# Patient Record
Sex: MALE | Race: WHITE | NOT HISPANIC OR LATINO | Employment: FULL TIME | URBAN - METROPOLITAN AREA
[De-identification: names, ages, dates, MRNs, and addresses within clinical notes are randomized per-mention and may not be internally consistent; named-entity substitution may affect disease eponyms.]

---

## 2017-09-26 PROBLEM — K72.90 LIVER FAILURE: Status: ACTIVE | Noted: 2017-09-26

## 2017-09-26 NOTE — PLAN OF CARE
Patient coming for liver transplant evaluation and [facility] admission.  Patient has decompensated alcoholic cirrhosis drinking heavily up until admission.  Has had several admissions recently for fluid over load.  Had mild hematemesis recently and had an EGD recently which showed small varices, thought to be due to esophagitis.  Currently patient is 30, T. Bili is 25 and Cr is 2.0.      Patient needs abdominal U/S pre debbi transplant with dopplers  Hepatology consult  Addiction psych consult and send a PETH test on admission  Hold diuretics and consider HRS treatment and nephrology consult

## 2017-09-27 ENCOUNTER — HOSPITAL ENCOUNTER (INPATIENT)
Facility: HOSPITAL | Age: 27
LOS: 42 days | Discharge: HOME HEALTH | DRG: 005 | End: 2017-11-08
Attending: INTERNAL MEDICINE | Admitting: INTERNAL MEDICINE
Payer: COMMERCIAL

## 2017-09-27 DIAGNOSIS — E66.09 CLASS 1 OBESITY DUE TO EXCESS CALORIES WITH BODY MASS INDEX (BMI) OF 33.0 TO 33.9 IN ADULT, UNSPECIFIED WHETHER SERIOUS COMORBIDITY PRESENT: ICD-10-CM

## 2017-09-27 DIAGNOSIS — D62 ACUTE BLOOD LOSS ANEMIA: ICD-10-CM

## 2017-09-27 DIAGNOSIS — R60.1 ANASARCA: ICD-10-CM

## 2017-09-27 DIAGNOSIS — E87.0 HYPERNATREMIA: ICD-10-CM

## 2017-09-27 DIAGNOSIS — E87.29 INCREASED ANION GAP METABOLIC ACIDOSIS: ICD-10-CM

## 2017-09-27 DIAGNOSIS — K70.31 ASCITES DUE TO ALCOHOLIC CIRRHOSIS: ICD-10-CM

## 2017-09-27 DIAGNOSIS — R56.9 SEIZURE: ICD-10-CM

## 2017-09-27 DIAGNOSIS — Z94.4 LIVER REPLACED BY TRANSPLANT: Chronic | ICD-10-CM

## 2017-09-27 DIAGNOSIS — K70.10 ALCOHOLIC HEPATITIS WITHOUT ASCITES: ICD-10-CM

## 2017-09-27 DIAGNOSIS — Z94.4 LIVER REPLACED BY TRANSPLANT: Primary | Chronic | ICD-10-CM

## 2017-09-27 DIAGNOSIS — E87.1 HYPONATREMIA: ICD-10-CM

## 2017-09-27 DIAGNOSIS — I63.9 STROKE DUE TO EMBOLISM: ICD-10-CM

## 2017-09-27 DIAGNOSIS — I34.0 NONRHEUMATIC MITRAL VALVE INSUFFICIENCY: ICD-10-CM

## 2017-09-27 DIAGNOSIS — R90.89 ABNORMAL FINDING ON MRI OF BRAIN: ICD-10-CM

## 2017-09-27 DIAGNOSIS — K74.60 DECOMPENSATED HEPATIC CIRRHOSIS: ICD-10-CM

## 2017-09-27 DIAGNOSIS — K72.90 LIVER FAILURE: ICD-10-CM

## 2017-09-27 DIAGNOSIS — F10.90 ALCOHOL USE DISORDER: ICD-10-CM

## 2017-09-27 DIAGNOSIS — K70.11 ASCITES DUE TO ALCOHOLIC HEPATITIS: ICD-10-CM

## 2017-09-27 DIAGNOSIS — E87.20 METABOLIC ACIDOSIS WITH NORMAL ANION GAP AND BICARBONATE LOSSES: ICD-10-CM

## 2017-09-27 DIAGNOSIS — Z87.19 HISTORY OF HEMATEMESIS: ICD-10-CM

## 2017-09-27 DIAGNOSIS — K70.11 ALCOHOLIC HEPATITIS WITH ASCITES: ICD-10-CM

## 2017-09-27 DIAGNOSIS — K76.82 HEPATIC ENCEPHALOPATHY: ICD-10-CM

## 2017-09-27 DIAGNOSIS — E87.6 HYPOKALEMIA: ICD-10-CM

## 2017-09-27 DIAGNOSIS — Z94.4 LIVER TRANSPLANTED: ICD-10-CM

## 2017-09-27 DIAGNOSIS — N17.0 ACUTE RENAL FAILURE WITH TUBULAR NECROSIS: ICD-10-CM

## 2017-09-27 DIAGNOSIS — K72.90 DECOMPENSATED HEPATIC CIRRHOSIS: ICD-10-CM

## 2017-09-27 DIAGNOSIS — Z29.89 PROPHYLACTIC IMMUNOTHERAPY: Chronic | ICD-10-CM

## 2017-09-27 DIAGNOSIS — D68.9 COAGULOPATHY: ICD-10-CM

## 2017-09-27 DIAGNOSIS — E43 SEVERE MALNUTRITION: ICD-10-CM

## 2017-09-27 DIAGNOSIS — R00.0 TACHYCARDIA: ICD-10-CM

## 2017-09-27 DIAGNOSIS — D72.829 LEUKOCYTOSIS, UNSPECIFIED TYPE: ICD-10-CM

## 2017-09-27 DIAGNOSIS — R73.9 HYPERGLYCEMIA: ICD-10-CM

## 2017-09-27 DIAGNOSIS — J81.1 PULMONARY EDEMA: ICD-10-CM

## 2017-09-27 DIAGNOSIS — N17.9 AKI (ACUTE KIDNEY INJURY): ICD-10-CM

## 2017-09-27 DIAGNOSIS — Z79.60 LONG-TERM USE OF IMMUNOSUPPRESSANT MEDICATION: Chronic | ICD-10-CM

## 2017-09-27 DIAGNOSIS — R53.81 PHYSICAL DECONDITIONING: ICD-10-CM

## 2017-09-27 DIAGNOSIS — D84.9 IMMUNOSUPPRESSION: ICD-10-CM

## 2017-09-27 DIAGNOSIS — Z91.89 AT RISK FOR OPPORTUNISTIC INFECTIONS: ICD-10-CM

## 2017-09-27 PROCEDURE — 82248 BILIRUBIN DIRECT: CPT

## 2017-09-27 PROCEDURE — 80321 ALCOHOLS BIOMARKERS 1OR 2: CPT

## 2017-09-27 PROCEDURE — 80053 COMPREHEN METABOLIC PANEL: CPT

## 2017-09-27 PROCEDURE — 80074 ACUTE HEPATITIS PANEL: CPT

## 2017-09-27 PROCEDURE — 36415 COLL VENOUS BLD VENIPUNCTURE: CPT

## 2017-09-27 PROCEDURE — 84100 ASSAY OF PHOSPHORUS: CPT

## 2017-09-27 PROCEDURE — 83605 ASSAY OF LACTIC ACID: CPT

## 2017-09-27 PROCEDURE — 83735 ASSAY OF MAGNESIUM: CPT

## 2017-09-27 PROCEDURE — 20000000 HC ICU ROOM

## 2017-09-27 PROCEDURE — 82746 ASSAY OF FOLIC ACID SERUM: CPT

## 2017-09-27 PROCEDURE — 85730 THROMBOPLASTIN TIME PARTIAL: CPT

## 2017-09-27 PROCEDURE — 82607 VITAMIN B-12: CPT

## 2017-09-27 PROCEDURE — 85025 COMPLETE CBC W/AUTO DIFF WBC: CPT

## 2017-09-27 PROCEDURE — 85610 PROTHROMBIN TIME: CPT

## 2017-09-27 PROCEDURE — 83690 ASSAY OF LIPASE: CPT

## 2017-09-27 RX ORDER — SODIUM CHLORIDE 0.9 % (FLUSH) 0.9 %
3 SYRINGE (ML) INJECTION EVERY 8 HOURS
Status: DISCONTINUED | OUTPATIENT
Start: 2017-09-28 | End: 2017-10-02

## 2017-09-28 PROBLEM — K70.11 ALCOHOLIC HEPATITIS WITH ASCITES: Status: ACTIVE | Noted: 2017-09-26

## 2017-09-28 PROBLEM — N17.9 ACUTE RENAL FAILURE: Status: ACTIVE | Noted: 2017-09-28

## 2017-09-28 PROBLEM — K72.90 DECOMPENSATED HEPATIC CIRRHOSIS: Status: ACTIVE | Noted: 2017-09-28

## 2017-09-28 PROBLEM — R60.1 ANASARCA: Status: ACTIVE | Noted: 2017-09-28

## 2017-09-28 PROBLEM — E87.29 INCREASED ANION GAP METABOLIC ACIDOSIS: Status: ACTIVE | Noted: 2017-09-28

## 2017-09-28 PROBLEM — K76.82 HEPATIC ENCEPHALOPATHY: Status: ACTIVE | Noted: 2017-09-28

## 2017-09-28 PROBLEM — K85.20 ACUTE ALCOHOLIC PANCREATITIS: Status: ACTIVE | Noted: 2017-09-28

## 2017-09-28 PROBLEM — K70.10 ALCOHOLIC HEPATITIS WITHOUT ASCITES: Status: ACTIVE | Noted: 2017-09-26

## 2017-09-28 PROBLEM — G93.41 ENCEPHALOPATHY, METABOLIC: Status: ACTIVE | Noted: 2017-09-28

## 2017-09-28 PROBLEM — E87.1 HYPONATREMIA: Status: ACTIVE | Noted: 2017-09-28

## 2017-09-28 PROBLEM — K74.60 DECOMPENSATED HEPATIC CIRRHOSIS: Status: ACTIVE | Noted: 2017-09-28

## 2017-09-28 PROBLEM — Z87.19 HISTORY OF HEMATEMESIS: Status: ACTIVE | Noted: 2017-09-28

## 2017-09-28 LAB
ALBUMIN SERPL BCP-MCNC: 1.9 G/DL
ALBUMIN SERPL BCP-MCNC: 2.1 G/DL
ALBUMIN SERPL BCP-MCNC: 2.3 G/DL
ALLENS TEST: ABNORMAL
ALP SERPL-CCNC: 100 U/L
ALP SERPL-CCNC: 98 U/L
ALP SERPL-CCNC: 98 U/L
ALT SERPL W/O P-5'-P-CCNC: 50 U/L
ALT SERPL W/O P-5'-P-CCNC: 51 U/L
ALT SERPL W/O P-5'-P-CCNC: 52 U/L
ANION GAP SERPL CALC-SCNC: 14 MMOL/L
ANION GAP SERPL CALC-SCNC: 15 MMOL/L
ANION GAP SERPL CALC-SCNC: 17 MMOL/L
ANISOCYTOSIS BLD QL SMEAR: SLIGHT
ANISOCYTOSIS BLD QL SMEAR: SLIGHT
APTT BLDCRRT: 31.1 SEC
APTT BLDCRRT: 32.1 SEC
AST SERPL-CCNC: 105 U/L
AST SERPL-CCNC: 106 U/L
AST SERPL-CCNC: 106 U/L
B-OH-BUTYR BLD STRIP-SCNC: 0.4 MMOL/L
BACTERIA #/AREA URNS AUTO: ABNORMAL /HPF
BASO STIPL BLD QL SMEAR: ABNORMAL
BASOPHILS # BLD AUTO: 0.01 K/UL
BASOPHILS NFR BLD: 0.1 %
BILIRUB DIRECT SERPL-MCNC: >14 MG/DL
BILIRUB SERPL-MCNC: 28 MG/DL
BILIRUB SERPL-MCNC: 29.4 MG/DL
BILIRUB SERPL-MCNC: 30 MG/DL
BILIRUB UR QL STRIP: ABNORMAL
BUN SERPL-MCNC: 47 MG/DL
BUN SERPL-MCNC: 47 MG/DL
BUN SERPL-MCNC: 48 MG/DL
BURR CELLS BLD QL SMEAR: ABNORMAL
BURR CELLS BLD QL SMEAR: ABNORMAL
CALCIUM SERPL-MCNC: 8.4 MG/DL
CALCIUM SERPL-MCNC: 8.9 MG/DL
CALCIUM SERPL-MCNC: 9 MG/DL
CHLORIDE SERPL-SCNC: 102 MMOL/L
CHLORIDE SERPL-SCNC: 103 MMOL/L
CHLORIDE SERPL-SCNC: 104 MMOL/L
CLARITY UR REFRACT.AUTO: ABNORMAL
CO2 SERPL-SCNC: 14 MMOL/L
CO2 SERPL-SCNC: 15 MMOL/L
CO2 SERPL-SCNC: 17 MMOL/L
COLOR UR AUTO: ABNORMAL
CREAT SERPL-MCNC: 2.4 MG/DL
CREAT SERPL-MCNC: 2.4 MG/DL
CREAT SERPL-MCNC: 2.6 MG/DL
CREAT UR-MCNC: 95 MG/DL
DELSYS: ABNORMAL
DIFFERENTIAL METHOD: ABNORMAL
EOSINOPHIL # BLD AUTO: 0 K/UL
EOSINOPHIL NFR BLD: 0.1 %
ERYTHROCYTE [DISTWIDTH] IN BLOOD BY AUTOMATED COUNT: 14.5 %
ERYTHROCYTE [DISTWIDTH] IN BLOOD BY AUTOMATED COUNT: 14.6 %
ERYTHROCYTE [DISTWIDTH] IN BLOOD BY AUTOMATED COUNT: 14.7 %
ERYTHROCYTE [SEDIMENTATION RATE] IN BLOOD BY WESTERGREN METHOD: 24 MM/H
EST. GFR  (AFRICAN AMERICAN): 37.1 ML/MIN/1.73 M^2
EST. GFR  (AFRICAN AMERICAN): 40.9 ML/MIN/1.73 M^2
EST. GFR  (AFRICAN AMERICAN): 40.9 ML/MIN/1.73 M^2
EST. GFR  (NON AFRICAN AMERICAN): 32.1 ML/MIN/1.73 M^2
EST. GFR  (NON AFRICAN AMERICAN): 35.4 ML/MIN/1.73 M^2
EST. GFR  (NON AFRICAN AMERICAN): 35.4 ML/MIN/1.73 M^2
ETHANOL SERPL-MCNC: <10 MG/DL
FLOW: 2
FOLATE SERPL-MCNC: 10.6 NG/ML
GLUCOSE SERPL-MCNC: 123 MG/DL
GLUCOSE SERPL-MCNC: 123 MG/DL
GLUCOSE SERPL-MCNC: 152 MG/DL
GLUCOSE UR QL STRIP: ABNORMAL
HAV IGM SERPL QL IA: NEGATIVE
HBV CORE IGM SERPL QL IA: NEGATIVE
HBV SURFACE AG SERPL QL IA: NEGATIVE
HCO3 UR-SCNC: 17.7 MMOL/L (ref 24–28)
HCT VFR BLD AUTO: 29.1 %
HCT VFR BLD AUTO: 30.1 %
HCT VFR BLD AUTO: 30.8 %
HCV AB SERPL QL IA: NEGATIVE
HGB BLD-MCNC: 10.5 G/DL
HGB BLD-MCNC: 10.9 G/DL
HGB BLD-MCNC: 10.9 G/DL
HGB UR QL STRIP: ABNORMAL
HIV 1+2 AB+HIV1 P24 AG SERPL QL IA: NEGATIVE
INR PPP: 1.6
INR PPP: 1.8
KETONES UR QL STRIP: NEGATIVE
LACTATE SERPL-SCNC: 1.1 MMOL/L
LACTATE SERPL-SCNC: 1.1 MMOL/L
LEUKOCYTE ESTERASE UR QL STRIP: NEGATIVE
LIPASE SERPL-CCNC: 4 U/L
LYMPHOCYTES # BLD AUTO: 0.6 K/UL
LYMPHOCYTES # BLD AUTO: 0.6 K/UL
LYMPHOCYTES # BLD AUTO: 0.9 K/UL
LYMPHOCYTES NFR BLD: 3.5 %
LYMPHOCYTES NFR BLD: 3.6 %
LYMPHOCYTES NFR BLD: 5.1 %
MAGNESIUM SERPL-MCNC: 1.9 MG/DL
MAGNESIUM SERPL-MCNC: 1.9 MG/DL
MAGNESIUM SERPL-MCNC: 2.1 MG/DL
MCH RBC QN AUTO: 38.3 PG
MCH RBC QN AUTO: 38.5 PG
MCH RBC QN AUTO: 38.7 PG
MCHC RBC AUTO-ENTMCNC: 35.4 G/DL
MCHC RBC AUTO-ENTMCNC: 36.1 G/DL
MCHC RBC AUTO-ENTMCNC: 36.2 G/DL
MCV RBC AUTO: 106 FL
MCV RBC AUTO: 107 FL
MCV RBC AUTO: 109 FL
MICROSCOPIC COMMENT: ABNORMAL
MODE: ABNORMAL
MONOCYTES # BLD AUTO: 0.9 K/UL
MONOCYTES # BLD AUTO: 1.3 K/UL
MONOCYTES # BLD AUTO: 1.3 K/UL
MONOCYTES NFR BLD: 5 %
MONOCYTES NFR BLD: 7.6 %
MONOCYTES NFR BLD: 7.6 %
NEUTROPHILS # BLD AUTO: 15.2 K/UL
NEUTROPHILS # BLD AUTO: 15.3 K/UL
NEUTROPHILS # BLD AUTO: 15.9 K/UL
NEUTROPHILS NFR BLD: 87.1 %
NEUTROPHILS NFR BLD: 87.5 %
NEUTROPHILS NFR BLD: 91.2 %
NITRITE UR QL STRIP: NEGATIVE
PCO2 BLDA: 24.4 MMHG (ref 35–45)
PH SMN: 7.47 [PH] (ref 7.35–7.45)
PH UR STRIP: 5 [PH] (ref 5–8)
PHOSPHATE SERPL-MCNC: 4.9 MG/DL
PHOSPHATE SERPL-MCNC: 5.5 MG/DL
PLATELET # BLD AUTO: 236 K/UL
PLATELET # BLD AUTO: 237 K/UL
PLATELET # BLD AUTO: 241 K/UL
PLATELET BLD QL SMEAR: ABNORMAL
PMV BLD AUTO: 10.3 FL
PMV BLD AUTO: 10.6 FL
PMV BLD AUTO: 9.9 FL
PO2 BLDA: 83 MMHG (ref 80–100)
POC BE: -6 MMOL/L
POC SATURATED O2: 97 % (ref 95–100)
POC TCO2: 18 MMOL/L (ref 23–27)
POIKILOCYTOSIS BLD QL SMEAR: ABNORMAL
POIKILOCYTOSIS BLD QL SMEAR: SLIGHT
POLYCHROMASIA BLD QL SMEAR: ABNORMAL
POLYCHROMASIA BLD QL SMEAR: ABNORMAL
POTASSIUM SERPL-SCNC: 2.7 MMOL/L
POTASSIUM SERPL-SCNC: 3.4 MMOL/L
POTASSIUM SERPL-SCNC: 3.5 MMOL/L
PROT SERPL-MCNC: 6 G/DL
PROT SERPL-MCNC: 6.4 G/DL
PROT SERPL-MCNC: 6.6 G/DL
PROT UR QL STRIP: NEGATIVE
PROTHROMBIN TIME: 15.8 SEC
PROTHROMBIN TIME: 17.8 SEC
RBC # BLD AUTO: 2.74 M/UL
RBC # BLD AUTO: 2.82 M/UL
RBC # BLD AUTO: 2.83 M/UL
RBC #/AREA URNS AUTO: 0 /HPF (ref 0–4)
SAMPLE: ABNORMAL
SITE: ABNORMAL
SODIUM SERPL-SCNC: 133 MMOL/L
SODIUM SERPL-SCNC: 133 MMOL/L
SODIUM SERPL-SCNC: 135 MMOL/L
SODIUM UR-SCNC: 24 MMOL/L
SP GR UR STRIP: 1.01 (ref 1–1.03)
SP02: 96
SQUAMOUS #/AREA URNS AUTO: 1 /HPF
URN SPEC COLLECT METH UR: ABNORMAL
UROBILINOGEN UR STRIP-ACNC: 4 EU/DL
UUN UR-MCNC: 603 MG/DL
VANCOMYCIN TROUGH SERPL-MCNC: 36.8 UG/ML
VIT B12 SERPL-MCNC: >2000 PG/ML
WBC # BLD AUTO: 17.33 K/UL
WBC # BLD AUTO: 17.56 K/UL
WBC # BLD AUTO: 17.73 K/UL
WBC #/AREA URNS AUTO: 3 /HPF (ref 0–5)

## 2017-09-28 PROCEDURE — 82803 BLOOD GASES ANY COMBINATION: CPT

## 2017-09-28 PROCEDURE — 25000003 PHARM REV CODE 250: Performed by: STUDENT IN AN ORGANIZED HEALTH CARE EDUCATION/TRAINING PROGRAM

## 2017-09-28 PROCEDURE — 83735 ASSAY OF MAGNESIUM: CPT

## 2017-09-28 PROCEDURE — 84100 ASSAY OF PHOSPHORUS: CPT

## 2017-09-28 PROCEDURE — 84540 ASSAY OF URINE/UREA-N: CPT

## 2017-09-28 PROCEDURE — 80053 COMPREHEN METABOLIC PANEL: CPT

## 2017-09-28 PROCEDURE — 63600175 PHARM REV CODE 636 W HCPCS

## 2017-09-28 PROCEDURE — 99900035 HC TECH TIME PER 15 MIN (STAT)

## 2017-09-28 PROCEDURE — 87086 URINE CULTURE/COLONY COUNT: CPT

## 2017-09-28 PROCEDURE — 83605 ASSAY OF LACTIC ACID: CPT

## 2017-09-28 PROCEDURE — S0028 INJECTION, FAMOTIDINE, 20 MG: HCPCS | Performed by: STUDENT IN AN ORGANIZED HEALTH CARE EDUCATION/TRAINING PROGRAM

## 2017-09-28 PROCEDURE — 80202 ASSAY OF VANCOMYCIN: CPT

## 2017-09-28 PROCEDURE — 80320 DRUG SCREEN QUANTALCOHOLS: CPT

## 2017-09-28 PROCEDURE — 99223 1ST HOSP IP/OBS HIGH 75: CPT | Mod: ,,, | Performed by: INTERNAL MEDICINE

## 2017-09-28 PROCEDURE — 86703 HIV-1/HIV-2 1 RESULT ANTBDY: CPT

## 2017-09-28 PROCEDURE — 80053 COMPREHEN METABOLIC PANEL: CPT | Mod: 91

## 2017-09-28 PROCEDURE — 93010 ELECTROCARDIOGRAM REPORT: CPT | Mod: ,,, | Performed by: INTERNAL MEDICINE

## 2017-09-28 PROCEDURE — A4216 STERILE WATER/SALINE, 10 ML: HCPCS | Performed by: STUDENT IN AN ORGANIZED HEALTH CARE EDUCATION/TRAINING PROGRAM

## 2017-09-28 PROCEDURE — 82010 KETONE BODYS QUAN: CPT

## 2017-09-28 PROCEDURE — 84300 ASSAY OF URINE SODIUM: CPT

## 2017-09-28 PROCEDURE — 99222 1ST HOSP IP/OBS MODERATE 55: CPT | Mod: ,,, | Performed by: PSYCHIATRY & NEUROLOGY

## 2017-09-28 PROCEDURE — 85610 PROTHROMBIN TIME: CPT

## 2017-09-28 PROCEDURE — 85730 THROMBOPLASTIN TIME PARTIAL: CPT

## 2017-09-28 PROCEDURE — 63600175 PHARM REV CODE 636 W HCPCS: Performed by: STUDENT IN AN ORGANIZED HEALTH CARE EDUCATION/TRAINING PROGRAM

## 2017-09-28 PROCEDURE — 20000000 HC ICU ROOM

## 2017-09-28 PROCEDURE — 99291 CRITICAL CARE FIRST HOUR: CPT | Mod: ,,, | Performed by: INTERNAL MEDICINE

## 2017-09-28 PROCEDURE — 81001 URINALYSIS AUTO W/SCOPE: CPT

## 2017-09-28 PROCEDURE — 87040 BLOOD CULTURE FOR BACTERIA: CPT | Mod: 59

## 2017-09-28 PROCEDURE — 27000221 HC OXYGEN, UP TO 24 HOURS

## 2017-09-28 PROCEDURE — 83735 ASSAY OF MAGNESIUM: CPT | Mod: 91

## 2017-09-28 PROCEDURE — 82570 ASSAY OF URINE CREATININE: CPT

## 2017-09-28 PROCEDURE — 36600 WITHDRAWAL OF ARTERIAL BLOOD: CPT

## 2017-09-28 PROCEDURE — 85025 COMPLETE CBC W/AUTO DIFF WBC: CPT | Mod: 91

## 2017-09-28 RX ORDER — FUROSEMIDE 10 MG/ML
40 INJECTION INTRAMUSCULAR; INTRAVENOUS DAILY
Status: DISCONTINUED | OUTPATIENT
Start: 2017-09-28 | End: 2017-09-28

## 2017-09-28 RX ORDER — ONDANSETRON 2 MG/ML
4 INJECTION INTRAMUSCULAR; INTRAVENOUS EVERY 6 HOURS PRN
Status: DISCONTINUED | OUTPATIENT
Start: 2017-09-28 | End: 2017-10-12

## 2017-09-28 RX ORDER — MAGNESIUM SULFATE HEPTAHYDRATE 40 MG/ML
2 INJECTION, SOLUTION INTRAVENOUS ONCE
Status: COMPLETED | OUTPATIENT
Start: 2017-09-28 | End: 2017-09-28

## 2017-09-28 RX ORDER — PANTOPRAZOLE SODIUM 40 MG/10ML
40 INJECTION, POWDER, LYOPHILIZED, FOR SOLUTION INTRAVENOUS 2 TIMES DAILY
Status: DISCONTINUED | OUTPATIENT
Start: 2017-09-28 | End: 2017-09-28

## 2017-09-28 RX ORDER — LACTULOSE 10 G/15ML
30 SOLUTION ORAL EVERY 6 HOURS
Status: DISCONTINUED | OUTPATIENT
Start: 2017-09-28 | End: 2017-09-28

## 2017-09-28 RX ORDER — PANTOPRAZOLE SODIUM 40 MG/10ML
40 INJECTION, POWDER, LYOPHILIZED, FOR SOLUTION INTRAVENOUS DAILY
Status: DISCONTINUED | OUTPATIENT
Start: 2017-09-28 | End: 2017-09-28

## 2017-09-28 RX ORDER — FUROSEMIDE 10 MG/ML
40 INJECTION INTRAMUSCULAR; INTRAVENOUS DAILY
Status: DISCONTINUED | OUTPATIENT
Start: 2017-09-29 | End: 2017-10-02

## 2017-09-28 RX ORDER — VANCOMYCIN HCL IN 5 % DEXTROSE 1.5G/250ML
15 PLASTIC BAG, INJECTION (ML) INTRAVENOUS
Status: DISCONTINUED | OUTPATIENT
Start: 2017-09-28 | End: 2017-09-28

## 2017-09-28 RX ORDER — FLUCONAZOLE 2 MG/ML
400 INJECTION, SOLUTION INTRAVENOUS
Status: DISCONTINUED | OUTPATIENT
Start: 2017-09-28 | End: 2017-10-01

## 2017-09-28 RX ORDER — FUROSEMIDE 40 MG/1
40 TABLET ORAL DAILY
Status: DISCONTINUED | OUTPATIENT
Start: 2017-09-28 | End: 2017-09-28

## 2017-09-28 RX ORDER — POTASSIUM CHLORIDE 7.45 MG/ML
10 INJECTION INTRAVENOUS
Status: COMPLETED | OUTPATIENT
Start: 2017-09-28 | End: 2017-09-28

## 2017-09-28 RX ORDER — LACTULOSE 10 G/15ML
200 SOLUTION ORAL; RECTAL 3 TIMES DAILY
Status: DISCONTINUED | OUTPATIENT
Start: 2017-09-28 | End: 2017-09-30

## 2017-09-28 RX ORDER — POTASSIUM CHLORIDE 7.45 MG/ML
10 INJECTION INTRAVENOUS
Status: DISCONTINUED | OUTPATIENT
Start: 2017-09-28 | End: 2017-09-29

## 2017-09-28 RX ORDER — PANTOPRAZOLE SODIUM 40 MG/1
40 TABLET, DELAYED RELEASE ORAL DAILY
Status: DISCONTINUED | OUTPATIENT
Start: 2017-09-28 | End: 2017-09-28

## 2017-09-28 RX ORDER — FAMOTIDINE 10 MG/ML
20 INJECTION INTRAVENOUS 2 TIMES DAILY
Status: DISCONTINUED | OUTPATIENT
Start: 2017-09-28 | End: 2017-10-02

## 2017-09-28 RX ORDER — SPIRONOLACTONE 25 MG/1
100 TABLET ORAL DAILY
Status: DISCONTINUED | OUTPATIENT
Start: 2017-09-28 | End: 2017-09-28

## 2017-09-28 RX ADMIN — Medication 3 ML: at 05:09

## 2017-09-28 RX ADMIN — FAMOTIDINE 20 MG: 10 INJECTION, SOLUTION INTRAVENOUS at 08:09

## 2017-09-28 RX ADMIN — POTASSIUM CHLORIDE 10 MEQ: 10 INJECTION, SOLUTION INTRAVENOUS at 08:09

## 2017-09-28 RX ADMIN — PIPERACILLIN AND TAZOBACTAM 4.5 G: 4; .5 INJECTION, POWDER, FOR SOLUTION INTRAVENOUS at 05:09

## 2017-09-28 RX ADMIN — METHYLPREDNISOLONE SODIUM SUCCINATE 40 MG: 40 INJECTION, POWDER, FOR SOLUTION INTRAMUSCULAR; INTRAVENOUS at 10:09

## 2017-09-28 RX ADMIN — FLUCONAZOLE 400 MG: 2 INJECTION INTRAVENOUS at 11:09

## 2017-09-28 RX ADMIN — PIPERACILLIN AND TAZOBACTAM 4.5 G: 4; .5 INJECTION, POWDER, FOR SOLUTION INTRAVENOUS at 01:09

## 2017-09-28 RX ADMIN — Medication 1500 MG: at 01:09

## 2017-09-28 RX ADMIN — LACTULOSE 200 G: 10 SOLUTION ORAL at 05:09

## 2017-09-28 RX ADMIN — POTASSIUM CHLORIDE 10 MEQ: 10 INJECTION, SOLUTION INTRAVENOUS at 07:09

## 2017-09-28 RX ADMIN — FUROSEMIDE 40 MG: 10 INJECTION, SOLUTION INTRAVENOUS at 04:09

## 2017-09-28 RX ADMIN — ONDANSETRON 4 MG: 2 INJECTION INTRAMUSCULAR; INTRAVENOUS at 04:09

## 2017-09-28 RX ADMIN — FAMOTIDINE 20 MG: 10 INJECTION, SOLUTION INTRAVENOUS at 10:09

## 2017-09-28 RX ADMIN — LACTULOSE 200 G: 10 SOLUTION ORAL at 04:09

## 2017-09-28 RX ADMIN — LACTULOSE 200 G: 10 SOLUTION ORAL at 10:09

## 2017-09-28 RX ADMIN — MAGNESIUM SULFATE IN WATER 2 G: 40 INJECTION, SOLUTION INTRAVENOUS at 07:09

## 2017-09-28 RX ADMIN — PIPERACILLIN AND TAZOBACTAM 4.5 G: 4; .5 INJECTION, POWDER, FOR SOLUTION INTRAVENOUS at 09:09

## 2017-09-28 RX ADMIN — POTASSIUM CHLORIDE 10 MEQ: 10 INJECTION, SOLUTION INTRAVENOUS at 10:09

## 2017-09-28 RX ADMIN — POTASSIUM CHLORIDE 10 MEQ: 10 INJECTION, SOLUTION INTRAVENOUS at 09:09

## 2017-09-28 RX ADMIN — Medication 3 ML: at 09:09

## 2017-09-28 NOTE — SUBJECTIVE & OBJECTIVE
"  Psychotherapeutics     None        Review of Systems  Objective:     Vital Signs (Most Recent):  Temp: 98.9 °F (37.2 °C) (09/28/17 0701)  Pulse: (!) 118 (09/28/17 1000)  Resp: (!) 36 (09/28/17 1000)  BP: 132/60 (09/28/17 1000)  SpO2: 95 % (09/28/17 1000) Vital Signs (24h Range):  Temp:  [98.9 °F (37.2 °C)-99.3 °F (37.4 °C)] 98.9 °F (37.2 °C)  Pulse:  [112-121] 118  Resp:  [18-36] 36  SpO2:  [94 %-97 %] 95 %  BP: (116-139)/(57-83) 132/60     Height: 5' 7" (170.2 cm)  Weight: 97.1 kg (214 lb 1.1 oz)  Body mass index is 33.53 kg/m².      Intake/Output Summary (Last 24 hours) at 09/28/17 1134  Last data filed at 09/28/17 1000   Gross per 24 hour   Intake              705 ml   Output             2145 ml   Net            -1440 ml       Physical Exam  Mental Status Exam:  Appearance: jaundiced, eyes closed, abdominal distention  Behavior/Cooperation: opens eyes briefly, no meaningful eye contact  Speech: only moans in response to questions  Mood: unable to ilicit  Affect: unable to assess  Thought Process: unable to assess  Thought Content: unable to assess  Orientation: unable to assess  Memory: unable to assess  Attention Span/Concentration: significantly impaired  Insight: unable to assess  Judgment: unable to assess    Significant Labs:   Recent Results (from the past 48 hour(s))   Comprehensive metabolic panel    Collection Time: 09/27/17 11:40 PM   Result Value Ref Range    Sodium 133 (L) 136 - 145 mmol/L    Potassium 3.5 3.5 - 5.1 mmol/L    Chloride 102 95 - 110 mmol/L    CO2 14 (L) 23 - 29 mmol/L    Glucose 123 (H) 70 - 110 mg/dL    BUN, Bld 47 (H) 6 - 20 mg/dL    Creatinine 2.6 (H) 0.5 - 1.4 mg/dL    Calcium 9.0 8.7 - 10.5 mg/dL    Total Protein 6.6 6.0 - 8.4 g/dL    Albumin 2.3 (L) 3.5 - 5.2 g/dL    Total Bilirubin 30.0 (H) 0.1 - 1.0 mg/dL    Alkaline Phosphatase 98 55 - 135 U/L     (H) 10 - 40 U/L    ALT 52 (H) 10 - 44 U/L    Anion Gap 17 (H) 8 - 16 mmol/L    eGFR if African American 37.1 (A) >60 " mL/min/1.73 m^2    eGFR if non  32.1 (A) >60 mL/min/1.73 m^2   Lactic acid, plasma    Collection Time: 09/27/17 11:40 PM   Result Value Ref Range    Lactate (Lactic Acid) 1.1 0.5 - 2.2 mmol/L   Lipase    Collection Time: 09/27/17 11:40 PM   Result Value Ref Range    Lipase 4 4 - 60 U/L   Magnesium    Collection Time: 09/27/17 11:40 PM   Result Value Ref Range    Magnesium 1.9 1.6 - 2.6 mg/dL   Phosphorus    Collection Time: 09/27/17 11:40 PM   Result Value Ref Range    Phosphorus 5.5 (H) 2.7 - 4.5 mg/dL   CBC auto differential    Collection Time: 09/27/17 11:40 PM   Result Value Ref Range    WBC 17.73 (H) 3.90 - 12.70 K/uL    RBC 2.83 (L) 4.60 - 6.20 M/uL    Hemoglobin 10.9 (L) 14.0 - 18.0 g/dL    Hematocrit 30.8 (L) 40.0 - 54.0 %     (H) 82 - 98 fL    MCH 38.5 (H) 27.0 - 31.0 pg    MCHC 35.4 32.0 - 36.0 g/dL    RDW 14.6 (H) 11.5 - 14.5 %    Platelets 237 150 - 350 K/uL    MPV 10.6 9.2 - 12.9 fL    Gran # 15.3 (H) 1.8 - 7.7 K/uL    Lymph # 0.9 (L) 1.0 - 4.8 K/uL    Mono # 1.3 (H) 0.3 - 1.0 K/uL    Eos # 0.0 0.0 - 0.5 K/uL    Baso # 0.01 0.00 - 0.20 K/uL    Gran% 87.1 (H) 38.0 - 73.0 %    Lymph% 5.1 (L) 18.0 - 48.0 %    Mono% 7.6 4.0 - 15.0 %    Eosinophil% 0.1 0.0 - 8.0 %    Basophil% 0.1 0.0 - 1.9 %    Platelet Estimate Appears normal     Aniso Slight     Poik Moderate     Poly Occasional     [place] Cells Moderate     Basophilic Stippling Occasional     Differential Method Automated    Protime-INR    Collection Time: 09/27/17 11:40 PM   Result Value Ref Range    Prothrombin Time 15.8 (H) 9.0 - 12.5 sec    INR 1.6 (H) 0.8 - 1.2   APTT    Collection Time: 09/27/17 11:40 PM   Result Value Ref Range    aPTT 31.1 21.0 - 32.0 sec   Hepatitis panel, acute    Collection Time: 09/27/17 11:40 PM   Result Value Ref Range    Hepatitis B Surface Ag Negative     Hep B C IgM Negative     Hep A IgM Negative     Hepatitis C Ab Negative    Bilirubin, direct    Collection Time: 09/27/17 11:40 PM   Result Value  Ref Range    Bilirubin, Direct >14.0 (H) 0.1 - 0.3 mg/dL   Folate    Collection Time: 09/27/17 11:40 PM   Result Value Ref Range    Folate 10.6 4.0 - 24.0 ng/mL   Vitamin B12    Collection Time: 09/27/17 11:40 PM   Result Value Ref Range    Vitamin B-12 >2000 (H) 210 - 950 pg/mL   Urinalysis    Collection Time: 09/28/17 12:21 AM   Result Value Ref Range    Specimen UA Urine, Catheterized     Color, UA Martha Yellow, Straw, Martha    Appearance, UA Hazy (A) Clear    pH, UA 5.0 5.0 - 8.0    Specific Gravity, UA 1.015 1.005 - 1.030    Protein, UA Negative Negative    Glucose, UA 1+ (A) Negative    Ketones, UA Negative Negative    Bilirubin (UA) 2+ (A) Negative    Occult Blood UA 1+ (A) Negative    Nitrite, UA Negative Negative    Urobilinogen, UA 4.0 <2.0 EU/dL    Leukocytes, UA Negative Negative   Creatinine, urine, random    Collection Time: 09/28/17 12:21 AM   Result Value Ref Range    Creatinine, Random Ur 95.0 23.0 - 375.0 mg/dL   Urea nitrogen, urine    Collection Time: 09/28/17 12:21 AM   Result Value Ref Range    Urine Urea Nitrogen, Random 603 140 - 1050 mg/dL   Urinalysis Microscopic    Collection Time: 09/28/17 12:21 AM   Result Value Ref Range    RBC, UA 0 0 - 4 /hpf    WBC, UA 3 0 - 5 /hpf    Bacteria, UA Few (A) None-Occ /hpf    Squam Epithel, UA 1 /hpf    Microscopic Comment SEE COMMENT    Ethanol    Collection Time: 09/28/17 12:47 AM   Result Value Ref Range    Alcohol, Medical, Serum <10 <10 mg/dL   ISTAT PROCEDURE    Collection Time: 09/28/17  2:00 AM   Result Value Ref Range    POC PH 7.467 (H) 7.35 - 7.45    POC PCO2 24.4 (LL) 35 - 45 mmHg    POC PO2 83 80 - 100 mmHg    POC HCO3 17.7 (L) 24 - 28 mmol/L    POC BE -6 -2 to 2 mmol/L    POC SATURATED O2 97 95 - 100 %    POC TCO2 18 (L) 23 - 27 mmol/L    Rate 24     Sample ARTERIAL     Site RR     Allens Test N/A     DelSys Nasal Can     Mode SPONT     Flow 2     Sp02 96    VANCOMYCIN, TROUGH before 4th dose    Collection Time: 09/28/17  2:46 AM   Result  Value Ref Range    Vancomycin-Trough 36.8 (HH) 10.0 - 22.0 ug/mL   Comprehensive metabolic panel    Collection Time: 09/28/17  2:46 AM   Result Value Ref Range    Sodium 133 (L) 136 - 145 mmol/L    Potassium 3.4 (L) 3.5 - 5.1 mmol/L    Chloride 103 95 - 110 mmol/L    CO2 15 (L) 23 - 29 mmol/L    Glucose 152 (H) 70 - 110 mg/dL    BUN, Bld 47 (H) 6 - 20 mg/dL    Creatinine 2.4 (H) 0.5 - 1.4 mg/dL    Calcium 8.9 8.7 - 10.5 mg/dL    Total Protein 6.4 6.0 - 8.4 g/dL    Albumin 2.1 (L) 3.5 - 5.2 g/dL    Total Bilirubin 29.4 (H) 0.1 - 1.0 mg/dL    Alkaline Phosphatase 100 55 - 135 U/L     (H) 10 - 40 U/L    ALT 51 (H) 10 - 44 U/L    Anion Gap 15 8 - 16 mmol/L    eGFR if African American 40.9 (A) >60 mL/min/1.73 m^2    eGFR if non  35.4 (A) >60 mL/min/1.73 m^2   Magnesium    Collection Time: 09/28/17  2:46 AM   Result Value Ref Range    Magnesium 1.9 1.6 - 2.6 mg/dL   Phosphorus    Collection Time: 09/28/17  2:46 AM   Result Value Ref Range    Phosphorus 4.9 (H) 2.7 - 4.5 mg/dL   CBC auto differential    Collection Time: 09/28/17  2:46 AM   Result Value Ref Range    WBC 17.33 (H) 3.90 - 12.70 K/uL    RBC 2.82 (L) 4.60 - 6.20 M/uL    Hemoglobin 10.9 (L) 14.0 - 18.0 g/dL    Hematocrit 30.1 (L) 40.0 - 54.0 %     (H) 82 - 98 fL    MCH 38.7 (H) 27.0 - 31.0 pg    MCHC 36.2 (H) 32.0 - 36.0 g/dL    RDW 14.7 (H) 11.5 - 14.5 %    Platelets 236 150 - 350 K/uL    MPV 9.9 9.2 - 12.9 fL    Gran # 15.2 (H) 1.8 - 7.7 K/uL    Lymph # 0.6 (L) 1.0 - 4.8 K/uL    Mono # 1.3 (H) 0.3 - 1.0 K/uL    Eos # 0.0 0.0 - 0.5 K/uL    Baso # 0.01 0.00 - 0.20 K/uL    Gran% 87.5 (H) 38.0 - 73.0 %    Lymph% 3.5 (L) 18.0 - 48.0 %    Mono% 7.6 4.0 - 15.0 %    Eosinophil% 0.1 0.0 - 8.0 %    Basophil% 0.1 0.0 - 1.9 %    Differential Method Automated    Beta - Hydroxybutyrate, Serum    Collection Time: 09/28/17  2:46 AM   Result Value Ref Range    Beta-Hydroxybutyrate 0.4 0.0 - 0.5 mmol/L   HIV-1 and HIV-2 antibodies    Collection  Time: 09/28/17  2:46 AM   Result Value Ref Range    HIV 1/2 Ag/Ab Negative Negative      No results found for: PHENYTOIN, PHENOBARB, VALPROATE, CBMZ    Significant Imaging: I have reviewed all pertinent imaging results/findings within the past 24 hours.

## 2017-09-28 NOTE — ASSESSMENT & PLAN NOTE
Anion Gap Metabolic Acidosis  -Corrected AG of 19 given hypoalbuminemia  -bicarb this morning on renal panel of 15.  -Alkalemia present on ABG last night.  -recommend repeat of ABG  -he has a primary respiratory alkalosis with underlying metabolic acidosis.    -No treatment from renal standpoint is needed at this time.  Will need close monitoring for a decline in his respiratory status.

## 2017-09-28 NOTE — PLAN OF CARE
Problem: Patient Care Overview  Goal: Plan of Care Review  Pt arrived from outside facility during shift. Pt arrives jaundiced with chicas in place (placed 9/27), Midline to R FA (placed 9/27), and PIV x 1. Pt oriented but confused at times. Abdomen US done at bedside. Pt given Lactulose enema, tolerated well, stool evacuated. Pt in Sinus Tach in 110s. Pt Tmax 99.3. Pt did have one episode of vomiting with green emesis, zofran given. Pt failed bedside swallow screen.  VS and assessment per flow sheet, patient progressing towards goals as tolerated, plan of care reviewed with Jhonny Diana and family, all concerns addressed, will continue to monitor.

## 2017-09-28 NOTE — CONSULTS
"[facility]  Addiction Psychiatry Consult Note    Patient Name: Jhonny Diana  MRN: [id number]   Code Status: Full Code  Admission Date: 9/27/2017  Hospital Length of Stay: 1 days  Expected Discharge Date:   Attending Physician: Sajan Sandra MD  Primary Care Provider: Provider [employer]    Current Legal Status: N/A    Patient information was obtained from parent, relative(s) and ER records.     Subjective:     Principal Problem:Alcoholic hepatitis without ascites    HPI: Per H&P: 26 yo male with hx of childhood asthma and heavy alcohol abuse (fifth of liquor daily since teenager, last drink 2.5 wks ago per mother) who [place] [facility] [place]. Patient initially presented to OSH for a 2 mo hx of progressively worsening abdominal pain with associated fatigue, jaundice, and abdominal distention with ~20# weight gain. HPI given by patient's mother and OSH records as patient's participation in interview limited by previous administration of pain medication at OSH.     On interview: Pt jaundiced and acutely disoriented, unable to attend to interview, only moans in response to basic questioning. Obtained some collateral information per pt's mother and Aunt, who are present in the room. They state that pt has been drinking one fifth of whiskey since his teenage years, and they believe that he initially got started to treat "social anxiety." Pt has been seen by multiple psychiatrists in the past, all of which recommended alcohol rehab/cessation prior to addressing other psychiatric issues. Pt recently received diagnosis of alcoholic hepatitis earlier this month. Pt has not had significant periods of sobriety in the past. Has never been to residential rehab or participated in IOP. Pt went to AA for brief periods in the past but stopped because it was "too depressing."     Hospital Course: No notes on file      Psychotherapeutics     None        Review " "of Systems  Objective:     Vital Signs (Most Recent):  Temp: 98.9 °F (37.2 °C) (09/28/17 0701)  Pulse: (!) 118 (09/28/17 1000)  Resp: (!) 36 (09/28/17 1000)  BP: 132/60 (09/28/17 1000)  SpO2: 95 % (09/28/17 1000) Vital Signs (24h Range):  Temp:  [98.9 °F (37.2 °C)-99.3 °F (37.4 °C)] 98.9 °F (37.2 °C)  Pulse:  [112-121] 118  Resp:  [18-36] 36  SpO2:  [94 %-97 %] 95 %  BP: (116-139)/(57-83) 132/60     Height: 5' 7" (170.2 cm)  Weight: 97.1 kg (214 lb 1.1 oz)  Body mass index is 33.53 kg/m².      Intake/Output Summary (Last 24 hours) at 09/28/17 1134  Last data filed at 09/28/17 1000   Gross per 24 hour   Intake              705 ml   Output             2145 ml   Net            -1440 ml       Physical Exam  Mental Status Exam:  Appearance: jaundiced, eyes closed, abdominal distention  Behavior/Cooperation: opens eyes briefly, no meaningful eye contact  Speech: only moans in response to questions  Mood: unable to ilicit  Affect: unable to assess  Thought Process: unable to assess  Thought Content: unable to assess  Orientation: unable to assess  Memory: unable to assess  Attention Span/Concentration: significantly impaired  Insight: unable to assess  Judgment: unable to assess    Significant Labs:   Recent Results (from the past 48 hour(s))   Comprehensive metabolic panel    Collection Time: 09/27/17 11:40 PM   Result Value Ref Range    Sodium 133 (L) 136 - 145 mmol/L    Potassium 3.5 3.5 - 5.1 mmol/L    Chloride 102 95 - 110 mmol/L    CO2 14 (L) 23 - 29 mmol/L    Glucose 123 (H) 70 - 110 mg/dL    BUN, Bld 47 (H) 6 - 20 mg/dL    Creatinine 2.6 (H) 0.5 - 1.4 mg/dL    Calcium 9.0 8.7 - 10.5 mg/dL    Total Protein 6.6 6.0 - 8.4 g/dL    Albumin 2.3 (L) 3.5 - 5.2 g/dL    Total Bilirubin 30.0 (H) 0.1 - 1.0 mg/dL    Alkaline Phosphatase 98 55 - 135 U/L     (H) 10 - 40 U/L    ALT 52 (H) 10 - 44 U/L    Anion Gap 17 (H) 8 - 16 mmol/L    eGFR if African American 37.1 (A) >60 mL/min/1.73 m^2    eGFR if non African American " 32.1 (A) >60 mL/min/1.73 m^2   Lactic acid, plasma    Collection Time: 09/27/17 11:40 PM   Result Value Ref Range    Lactate (Lactic Acid) 1.1 0.5 - 2.2 mmol/L   Lipase    Collection Time: 09/27/17 11:40 PM   Result Value Ref Range    Lipase 4 4 - 60 U/L   Magnesium    Collection Time: 09/27/17 11:40 PM   Result Value Ref Range    Magnesium 1.9 1.6 - 2.6 mg/dL   Phosphorus    Collection Time: 09/27/17 11:40 PM   Result Value Ref Range    Phosphorus 5.5 (H) 2.7 - 4.5 mg/dL   CBC auto differential    Collection Time: 09/27/17 11:40 PM   Result Value Ref Range    WBC 17.73 (H) 3.90 - 12.70 K/uL    RBC 2.83 (L) 4.60 - 6.20 M/uL    Hemoglobin 10.9 (L) 14.0 - 18.0 g/dL    Hematocrit 30.8 (L) 40.0 - 54.0 %     (H) 82 - 98 fL    MCH 38.5 (H) 27.0 - 31.0 pg    MCHC 35.4 32.0 - 36.0 g/dL    RDW 14.6 (H) 11.5 - 14.5 %    Platelets 237 150 - 350 K/uL    MPV 10.6 9.2 - 12.9 fL    Gran # 15.3 (H) 1.8 - 7.7 K/uL    Lymph # 0.9 (L) 1.0 - 4.8 K/uL    Mono # 1.3 (H) 0.3 - 1.0 K/uL    Eos # 0.0 0.0 - 0.5 K/uL    Baso # 0.01 0.00 - 0.20 K/uL    Gran% 87.1 (H) 38.0 - 73.0 %    Lymph% 5.1 (L) 18.0 - 48.0 %    Mono% 7.6 4.0 - 15.0 %    Eosinophil% 0.1 0.0 - 8.0 %    Basophil% 0.1 0.0 - 1.9 %    Platelet Estimate Appears normal     Aniso Slight     Poik Moderate     Poly Occasional     [place] Cells Moderate     Basophilic Stippling Occasional     Differential Method Automated    Protime-INR    Collection Time: 09/27/17 11:40 PM   Result Value Ref Range    Prothrombin Time 15.8 (H) 9.0 - 12.5 sec    INR 1.6 (H) 0.8 - 1.2   APTT    Collection Time: 09/27/17 11:40 PM   Result Value Ref Range    aPTT 31.1 21.0 - 32.0 sec   Hepatitis panel, acute    Collection Time: 09/27/17 11:40 PM   Result Value Ref Range    Hepatitis B Surface Ag Negative     Hep B C IgM Negative     Hep A IgM Negative     Hepatitis C Ab Negative    Bilirubin, direct    Collection Time: 09/27/17 11:40 PM   Result Value Ref Range    Bilirubin, Direct >14.0 (H) 0.1 - 0.3  mg/dL   Folate    Collection Time: 09/27/17 11:40 PM   Result Value Ref Range    Folate 10.6 4.0 - 24.0 ng/mL   Vitamin B12    Collection Time: 09/27/17 11:40 PM   Result Value Ref Range    Vitamin B-12 >2000 (H) 210 - 950 pg/mL   Urinalysis    Collection Time: 09/28/17 12:21 AM   Result Value Ref Range    Specimen UA Urine, Catheterized     Color, UA Martha Yellow, Straw, Martha    Appearance, UA Hazy (A) Clear    pH, UA 5.0 5.0 - 8.0    Specific Gravity, UA 1.015 1.005 - 1.030    Protein, UA Negative Negative    Glucose, UA 1+ (A) Negative    Ketones, UA Negative Negative    Bilirubin (UA) 2+ (A) Negative    Occult Blood UA 1+ (A) Negative    Nitrite, UA Negative Negative    Urobilinogen, UA 4.0 <2.0 EU/dL    Leukocytes, UA Negative Negative   Creatinine, urine, random    Collection Time: 09/28/17 12:21 AM   Result Value Ref Range    Creatinine, Random Ur 95.0 23.0 - 375.0 mg/dL   Urea nitrogen, urine    Collection Time: 09/28/17 12:21 AM   Result Value Ref Range    Urine Urea Nitrogen, Random 603 140 - 1050 mg/dL   Urinalysis Microscopic    Collection Time: 09/28/17 12:21 AM   Result Value Ref Range    RBC, UA 0 0 - 4 /hpf    WBC, UA 3 0 - 5 /hpf    Bacteria, UA Few (A) None-Occ /hpf    Squam Epithel, UA 1 /hpf    Microscopic Comment SEE COMMENT    Ethanol    Collection Time: 09/28/17 12:47 AM   Result Value Ref Range    Alcohol, Medical, Serum <10 <10 mg/dL   ISTAT PROCEDURE    Collection Time: 09/28/17  2:00 AM   Result Value Ref Range    POC PH 7.467 (H) 7.35 - 7.45    POC PCO2 24.4 (LL) 35 - 45 mmHg    POC PO2 83 80 - 100 mmHg    POC HCO3 17.7 (L) 24 - 28 mmol/L    POC BE -6 -2 to 2 mmol/L    POC SATURATED O2 97 95 - 100 %    POC TCO2 18 (L) 23 - 27 mmol/L    Rate 24     Sample ARTERIAL     Site RR     Allens Test N/A     DelSys Nasal Can     Mode SPONT     Flow 2     Sp02 96    VANCOMYCIN, TROUGH before 4th dose    Collection Time: 09/28/17  2:46 AM   Result Value Ref Range    Vancomycin-Trough 36.8 (HH) 10.0  - 22.0 ug/mL   Comprehensive metabolic panel    Collection Time: 09/28/17  2:46 AM   Result Value Ref Range    Sodium 133 (L) 136 - 145 mmol/L    Potassium 3.4 (L) 3.5 - 5.1 mmol/L    Chloride 103 95 - 110 mmol/L    CO2 15 (L) 23 - 29 mmol/L    Glucose 152 (H) 70 - 110 mg/dL    BUN, Bld 47 (H) 6 - 20 mg/dL    Creatinine 2.4 (H) 0.5 - 1.4 mg/dL    Calcium 8.9 8.7 - 10.5 mg/dL    Total Protein 6.4 6.0 - 8.4 g/dL    Albumin 2.1 (L) 3.5 - 5.2 g/dL    Total Bilirubin 29.4 (H) 0.1 - 1.0 mg/dL    Alkaline Phosphatase 100 55 - 135 U/L     (H) 10 - 40 U/L    ALT 51 (H) 10 - 44 U/L    Anion Gap 15 8 - 16 mmol/L    eGFR if African American 40.9 (A) >60 mL/min/1.73 m^2    eGFR if non  35.4 (A) >60 mL/min/1.73 m^2   Magnesium    Collection Time: 09/28/17  2:46 AM   Result Value Ref Range    Magnesium 1.9 1.6 - 2.6 mg/dL   Phosphorus    Collection Time: 09/28/17  2:46 AM   Result Value Ref Range    Phosphorus 4.9 (H) 2.7 - 4.5 mg/dL   CBC auto differential    Collection Time: 09/28/17  2:46 AM   Result Value Ref Range    WBC 17.33 (H) 3.90 - 12.70 K/uL    RBC 2.82 (L) 4.60 - 6.20 M/uL    Hemoglobin 10.9 (L) 14.0 - 18.0 g/dL    Hematocrit 30.1 (L) 40.0 - 54.0 %     (H) 82 - 98 fL    MCH 38.7 (H) 27.0 - 31.0 pg    MCHC 36.2 (H) 32.0 - 36.0 g/dL    RDW 14.7 (H) 11.5 - 14.5 %    Platelets 236 150 - 350 K/uL    MPV 9.9 9.2 - 12.9 fL    Gran # 15.2 (H) 1.8 - 7.7 K/uL    Lymph # 0.6 (L) 1.0 - 4.8 K/uL    Mono # 1.3 (H) 0.3 - 1.0 K/uL    Eos # 0.0 0.0 - 0.5 K/uL    Baso # 0.01 0.00 - 0.20 K/uL    Gran% 87.5 (H) 38.0 - 73.0 %    Lymph% 3.5 (L) 18.0 - 48.0 %    Mono% 7.6 4.0 - 15.0 %    Eosinophil% 0.1 0.0 - 8.0 %    Basophil% 0.1 0.0 - 1.9 %    Differential Method Automated    Beta - Hydroxybutyrate, Serum    Collection Time: 09/28/17  2:46 AM   Result Value Ref Range    Beta-Hydroxybutyrate 0.4 0.0 - 0.5 mmol/L   HIV-1 and HIV-2 antibodies    Collection Time: 09/28/17  2:46 AM   Result Value Ref Range    HIV  1/2 Ag/Ab Negative Negative      No results found for: PHENYTOIN, PHENOBARB, VALPROATE, CBMZ    Significant Imaging: I have reviewed all pertinent imaging results/findings within the past 24 hours.    Assessment/Plan:     * Alcoholic hepatitis without ascites    - Unable to perform risk assessment at this time due to pt's current mental status  - In the event that liver transplant is performed emergently during this hospitalization, we will provide recommendations for rehab/sobriety on the back end  - Will continue to follow and provide recommendations once pt's mentation improves                 Need for Continued Hospitalization:   Fulminant hepatic failure    Anticipated Disposition: tbd    Mert Omer MD  [facility] PGY-II  09/28/2017 11:44 AM      Patient seen and examined with resident agree with assessment and plan as documented above.     -Would get PEth and UDS  -Will ctn to follow     Edmar Win MD  [facility]

## 2017-09-28 NOTE — ASSESSMENT & PLAN NOTE
-likely 2/2 to pt's liver dysfunction  -Ammonia at   -follow up ammonia   -titrate lactulose for 3-4 BMs daily

## 2017-09-28 NOTE — HPI
"Mr. Diana is a 26 y/o male with hx of childhood asthma and heavy alcohol abuse (fifth of liquor daily since teenager, last drink 2.5 wks ago per mother) who was [place] [facility] [place] of [place]/liver transplant evaluation. Patient initially presented to OSH for a 2 mo hx of progressively worsening abdominal pain with associated fatigue, jaundice, and abdominal distention with ~20# weight gain. HPI given by patient's mother and OSH records as patient's participation in interview limited by previous administration of pain medication at OSH.     Per mother, patient did not have a fever, chills, dark stools, known sick contacts, nausea, vomiting, diarrhea, confusion, or HA. Reports family hx of alcoholic cirrhosis in father. Pt was recently diagnosed with alcoholic hepatitis during his admission at OSH on 9/6-9/15. During that hospitalization, patient was given ceftriaxone for SBP ppx, prednisone for alcoholic hepatitis, and diuresed. Pt was discharged and instructed to follow up with PCP and AA within 1 wk. After his discharge, patient returned to ED 4 days after for increased weight gain and worsening abdominal pain/jaundice. At OSH, patient was given fluids (for suspected pancreatitis), diuresed and given IV albumin. Two days prior to transfer to [facility] [place], patient had an episode of hematemesis and underwent an EGD on 9/25 that revealed "1 sm varix in distal esophagus, no stigmata of bleeding, gastropathy of portal hypertension, and reflux esophagitis".  No further episodes of hematemesis noted since initial episode.       Recent labs at OSH: Na 134, CO2 18, BUN 36, Cr 2, Tprot 5.9, albumin 1.9, Tbili 24, alk phos 83, , ALT51. Patient also had lipase 532 and ammonia 118 during his previous hospitalization at OSH. Labwork also notable for negative mitochondrial ab, sm muscle ab, and ceruloplasmin 22. Imaging at OSH revealing abdo U/S (9/20): hepatosplenomegaly, CT abdo w contrast: hepatomegaly with marked " fatty infiltration of the liver and mild ascites (9/6). No CDs available for review.

## 2017-09-28 NOTE — SUBJECTIVE & OBJECTIVE
Past Medical History:   Diagnosis Date    Hypertension        Past Surgical History:   Procedure Laterality Date    APPENDECTOMY         Review of patient's allergies indicates:   Allergen Reactions    Bactrim [sulfamethoxazole-trimethoprim] Other (See Comments)     Mookie Trell Syndrome       Family History     None        Social History Main Topics    Smoking status: Former Smoker     Types: Cigarettes    Smokeless tobacco: Never Used    Alcohol use Yes      Comment: a fifth of liquor daily for years, cut back over last 2 months    Drug use: No    Sexual activity: Not on file      Review of Systems   Constitutional: Negative for chills and fever.   HENT: Negative for sore throat.    Eyes: Negative for visual disturbance.        Scleral icterus     Respiratory: Negative for cough and shortness of breath.    Cardiovascular: Positive for leg swelling. Negative for chest pain and palpitations.   Gastrointestinal: Positive for abdominal pain and constipation. Negative for diarrhea, nausea and vomiting.        Hx of hematemesis 2 days ago, none since   Genitourinary: Negative for dysuria and hematuria.   Musculoskeletal: Negative for arthralgias.   Skin:        Jaundice     Neurological: Positive for weakness. Negative for dizziness and headaches.   Hematological: Negative for adenopathy. Does not bruise/bleed easily.   Psychiatric/Behavioral: Negative for confusion.     Objective:     Vital Signs (Most Recent):  Temp: 99.1 °F (37.3 °C) (09/27/17 2327)  Pulse: (!) 117 (09/28/17 0045)  Resp: (!) 29 (09/28/17 0045)  BP: 118/73 (09/28/17 0045)  SpO2: 97 % (09/28/17 0045) Vital Signs (24h Range):  Temp:  [99.1 °F (37.3 °C)] 99.1 °F (37.3 °C)  Pulse:  [113-117] 117  Resp:  [18-29] 29  SpO2:  [94 %-97 %] 97 %  BP: (117-124)/(57-73) 118/73   Weight: 97.1 kg (214 lb 1.1 oz)  Body mass index is 33.53 kg/m².      Intake/Output Summary (Last 24 hours) at 09/28/17 0108  Last data filed at 09/27/17 2345   Gross per 24 hour    Intake                0 ml   Output              125 ml   Net             -125 ml       Physical Exam   HENT:   Dry oral mucosa   Eyes: EOM are normal. Pupils are equal, round, and reactive to light. Scleral icterus is present.   Neck: JVD present.   Cardiovascular: Regular rhythm and normal heart sounds.    No murmur heard.  Pulmonary/Chest: Effort normal and breath sounds normal.   Abdominal: He exhibits distension. He exhibits no mass. There is tenderness (RUQ). There is no rebound and no guarding.   Hypoactive generalized BS     Musculoskeletal: He exhibits edema (3+ pitting edema to thighs BL).   Lymphadenopathy:     He has no cervical adenopathy.   Neurological:   Oriented to person and place  Intermittently follows commands  Lethargic but awakens to voice   Skin: Capillary refill takes less than 2 seconds.   Jaundice         Vents:     Lines/Drains/Airways     Drain                 Urethral Catheter 09/27/17 1 day          Peripheral Intravenous Line                 Peripheral IV - Single Lumen Left Hand -- days         Midline Catheter Insertion/Assessment  - Single Lumen 09/27/17 1800 Right less than 1 day              Significant Labs:    CBC/Anemia Profile:    Recent Labs  Lab 09/27/17  2340   WBC 17.73*   HGB 10.9*   HCT 30.8*      *   RDW 14.6*        Chemistries:    Recent Labs  Lab 09/27/17  2340   *   K 3.5      CO2 14*   BUN 47*   CREATININE 2.6*   CALCIUM 9.0   ALBUMIN 2.3*   PROT 6.6   BILITOT 30.0*   ALKPHOS 98   ALT 52*   *   MG 1.9   PHOS 5.5*       ABGs: No results for input(s): PH, PCO2, HCO3, POCSATURATED, BE in the last 48 hours.  Bilirubin:   Recent Labs  Lab 09/27/17  2340   BILITOT 30.0*     Blood Culture: No results for input(s): LABBLOO in the last 48 hours.  Coagulation:   Recent Labs  Lab 09/27/17  2340   INR 1.6*   APTT 31.1     Lactic Acid:   Recent Labs  Lab 09/27/17  2340   LACTATE 1.1     Urine Culture: No results for input(s): LABURIN in the  last 48 hours.  Urine Studies: No results for input(s): COLORU, APPEARANCEUA, PHUR, SPECGRAV, PROTEINUA, GLUCUA, KETONESU, BILIRUBINUA, OCCULTUA, NITRITE, UROBILINOGEN, LEUKOCYTESUR, RBCUA, WBCUA, BACTERIA, SQUAMEPITHEL, HYALINECASTS in the last 48 hours.    Invalid input(s): WRIGHTSUR    Significant Imaging: pending CXR, abdominal U/S

## 2017-09-28 NOTE — CONSULTS
H&P  General Surgery    Reason for consult: Abdominal Compartment syndrome     HPI: 26 yo male with hx of HTN and alcohol and marijuana abuse presents to the hospital as a transfer from outside hospital overnight for acute alcoholic hepatitis. History was obtained by reviewing records and with aide of his mother and aunt. Mother and Aunt state that patient presented to OSH 3-4 times (-9/15) in the last several weeks with complaints of abdominal pain and was subsequently discharged. In his most recent visit, he was treated for suspected pancreatitis, and EGD was performed secondary to hematemesis which showed small esophageal varices. Unknown history of when patient began to get jaundice and how much alcohol he drinks daily. Father is now  and was an alcoholic. His mental status has waxed and waned over the past several days. Bilious emesis last night. Last BM yesterday after lactulose enema given. Making good urine. No CP. No recent fevers.     MELD-Na score: 34 at 2017  2:46 AM  MELD score: 33 at 2017  2:46 AM  Calculated from:  Serum Creatinine: 2.4 mg/dL at 2017  2:46 AM  Serum Sodium: 133 mmol/L at 2017  2:46 AM  Total Bilirubin: 29.4 mg/dL at 2017  2:46 AM  INR(ratio): 1.6 at 2017 11:40 PM  Age: 27 years    PMH:   Past Medical History:   Diagnosis Date    Hypertension        PSH:   Past Surgical History:   Procedure Laterality Date    APPENDECTOMY         Allergies:   Review of patient's allergies indicates:   Allergen Reactions    Bactrim [sulfamethoxazole-trimethoprim] Other (See Comments)     Mookie Trell Syndrome     Meds: There are no discharge medications for this patient.      History reviewed. No pertinent family history.    Review of Systems - For pertinent positives please see HPI   Constitutional: Negative for fever and chills.   HENT: Negative for congestion and ear pain.   Respiratory: Negative for cough and shortness of breath.   Cardiovascular:  Negative for chest pain and palpitations.   Gastrointestinal: See HPI   Genitourinary: Negative for dysuria and hematuria.   Skin: Negative for rash and wound.   Neurological: Negative for weakness and numbness.   Psychiatric/Behavioral: see HPI     OBJECTIVE:     Vital Signs (Most Recent)  Temp: 98.9 °F (37.2 °C) (09/28/17 0701)  Pulse: (!) 118 (09/28/17 1200)  Resp: (!) 40 (09/28/17 1200)  BP: 129/62 (09/28/17 1200)  SpO2: 97 % (09/28/17 1200)    Vital Signs Range (Last 24H):  Temp:  [98.9 °F (37.2 °C)-99.3 °F (37.4 °C)]   Pulse:  [112-121]   Resp:  [18-40]   BP: (116-139)/(57-83)   SpO2:  [94 %-97 %]     I & O (Last 24H):  Intake/Output Summary (Last 24 hours) at 09/28/17 1317  Last data filed at 09/28/17 1200   Gross per 24 hour   Intake              905 ml   Output             2295 ml   Net            -1390 ml       Physical Exam:  General: well developed, well nourished, icteric, mild distress  Lungs: Tachypnea present.   Heart: Tachycardiac.   Head: NC/AT  Eyes: Scleral icterus present.   Abdomen: Distended. Soft, mild diffuse tenderness in all quadrants. No rebound or guarding.   Neuro: [place]eake, somewhat alert. Not oriented. At times, combative.   Ext: 1+ lower extremity swelling bilaterally.     Laboratory:  CBC:   Recent Labs  Lab 09/28/17  0246   WBC 17.33*   RBC 2.82*   HGB 10.9*   HCT 30.1*      *   MCH 38.7*   MCHC 36.2*     CMP:   Recent Labs  Lab 09/28/17  0246   *   CALCIUM 8.9   ALBUMIN 2.1*   PROT 6.4   *   K 3.4*   CO2 15*      BUN 47*   CREATININE 2.4*   ALKPHOS 100   ALT 51*   *   BILITOT 29.4*     Coagulation:   Recent Labs  Lab 09/27/17  2340   INR 1.6*   APTT 31.1     ABGs:   Recent Labs  Lab 09/28/17  0200   PH 7.467*   PCO2 24.4*   HCO3 17.7*   POCSATURATED 97   BE -6     Labs within the past 24 hours have been reviewed.    CT abdomen/pelvis: Reviewed. Pending final result. Large dilated colon, cecum measuring 8cm. Minimal ascites.   Hepatomegaly.     Small volume ascites, mesenteric edema, portal colopathy, anasarca and mild splenomegaly suggesting sequela of portal hypertension.    Patchy confluent groundglass opacities throughout both lower lungs, probably edema, aspiration, or pneumonia.  Prominent loops of small bowel containing fecal material mixed with scattered gas density suggesting slow flow.  No high-grade bowel obstruction.    CT head:  FINDINGS: Patient is rotated and tilted within the scanner. Beam hardening with streak artifact somewhat limits evaluation. The brain appears normally formed without convincing evidence of hemorrhage, mass, midline shift or acute major vascular territory infarct.  Gray-white interface appears intact.  The ventricular system is normal in size for age and demonstrates no distortion by mass effect.    No extra-axial hemorrhage or mass. The extracranial structures are within normal limits.  No displaced calvarial fracture.  Imaged portions of the paranasal sinuses and mastoid air cells are clear. Imaged portions of the orbits are within normal limits.    ASSESSMENT/PLAN:     28 yo male with acute hepatitis 2/2 to alcohol, hepatorenal syndrome, hepatic encephalopathy    - CT scan showing dilated colon, minimal ascites, no obstruction  - recommend paracentesis if able   - no signs of abdominal compartment syndrome - patient is making urine, and currently satting well on room air. Recommend bladder pressures only when intubated, sedated, and paralyzed as these are frequently inaccurate.   - Recommend intubating patient for altered mental status, to correct respiratory alkalosis, place NG tube for decompression.   - continue strict I&os   - Also recommend serial labs including INR, PTT, cbc, cmp, abg, and lactates   - recommend following hepatology recs for acute hepatitis, coverage for SBP. May need neurology consult if patient has increased intracranial pressure   - poor prognosis especially if not liver transplant  candidate   - please call with questions       Lázaro Beckman, PGY-3  General Surgery  [phone]

## 2017-09-28 NOTE — PT/OT/SLP PROGRESS
Speech Language Pathology      Jhonny Diana  MRN: [id number]    Patient not seen today secondary to Nursing hold (Comment) (Per RN Pt currently NPO and on intubation watch. ). Will follow-up as medically appropriate.     Carol Dutton, GERARD-SLP

## 2017-09-28 NOTE — ASSESSMENT & PLAN NOTE
-worsening renal function    -Cr at OSH 1.5, Cr on admit 2.6 with elevated BUN  -follow up UA, urine lytes and complete abdo U/S  -strict I/Os  -chicas present on transfer (will exchange) with good UOP  -suspect HRS given liver failure; however, will rule out other etiologies for ARF.  -consider nephrology consult in AM, no acute indication for RRT at this time

## 2017-09-28 NOTE — ASSESSMENT & PLAN NOTE
Hypervolemic Hyponatremia  -UOSM/SOSM  -Likely 2/2 ADH secretion from decreased renal perfusion due to tense ascites.  -recommend lasix 40 mg IV to aid in free water excretion.

## 2017-09-28 NOTE — SUBJECTIVE & OBJECTIVE
Review of Systems   Unable to perform ROS: Mental status change       Past Medical History:   Diagnosis Date    Hypertension        Past Surgical History:   Procedure Laterality Date    APPENDECTOMY         Family history of liver disease: No    Review of patient's allergies indicates:   Allergen Reactions    Bactrim [sulfamethoxazole-trimethoprim] Other (See Comments)     Mookie Trell Syndrome       Social History Main Topics    Smoking status: Former Smoker     Types: Cigarettes    Smokeless tobacco: Never Used    Alcohol use Yes      Comment: a fifth of liquor daily for years, cut back over last 2 months    Drug use: No    Sexual activity: Not on file       No prescriptions prior to admission.       Objective:     Vital Signs (Most Recent):  Temp: 98.9 °F (37.2 °C) (09/28/17 0701)  Pulse: (!) 118 (09/28/17 1200)  Resp: (!) 40 (09/28/17 1200)  BP: 129/62 (09/28/17 1200)  SpO2: 97 % (09/28/17 1200) Vital Signs (24h Range):  Temp:  [98.9 °F (37.2 °C)-99.3 °F (37.4 °C)] 98.9 °F (37.2 °C)  Pulse:  [112-121] 118  Resp:  [18-40] 40  SpO2:  [94 %-97 %] 97 %  BP: (116-139)/(57-83) 129/62     Weight: 97.1 kg (214 lb 1.1 oz) (09/27/17 2327)  Body mass index is 33.53 kg/m².    Physical Exam   Constitutional: No distress.   obtunded   HENT:   Mouth/Throat: No oropharyngeal exudate.   Eyes: Conjunctivae are normal. Scleral icterus is present.   Neck: Neck supple. No tracheal deviation present.   Cardiovascular: Regular rhythm and normal heart sounds.  Exam reveals no gallop and no friction rub.    No murmur heard.  tachycardic   Pulmonary/Chest: Effort normal. No respiratory distress. He has no wheezes. He has rales (scattered).   Audible grunting, occasional productive cough   Abdominal: Soft. Bowel sounds are normal. He exhibits distension. There is tenderness. There is no guarding.   Neurological:   Obtunded, does not answer questions, not oriented   Skin: Skin is warm and dry. He is not diaphoretic.   icteric        MELD-Na score: 34 at 9/28/2017  2:46 AM  MELD score: 33 at 9/28/2017  2:46 AM  Calculated from:  Serum Creatinine: 2.4 mg/dL at 9/28/2017  2:46 AM  Serum Sodium: 133 mmol/L at 9/28/2017  2:46 AM  Total Bilirubin: 29.4 mg/dL at 9/28/2017  2:46 AM  INR(ratio): 1.6 at 9/27/2017 11:40 PM  Age: 27 years    Significant Labs:  CBC:   Recent Labs  Lab 09/28/17  0246   WBC 17.33*   RBC 2.82*   HGB 10.9*   HCT 30.1*        CMP:   Recent Labs  Lab 09/28/17  0246   *   CALCIUM 8.9   ALBUMIN 2.1*   PROT 6.4   *   K 3.4*   CO2 15*      BUN 47*   CREATININE 2.4*   ALKPHOS 100   ALT 51*   *   BILITOT 29.4*     Coagulation:   Recent Labs  Lab 09/27/17  2340   INR 1.6*   APTT 31.1       Significant Imaging:  Labs: Reviewed  .

## 2017-09-28 NOTE — ASSESSMENT & PLAN NOTE
Patient critically ill with obtundation and evidence of liver failure.    Perform diagnostic paracentesis; send fluid for Albumin, Total Protein, Cell Count & Diff, Gram Stain, Culture.   Appreciate Addiction Psych consult  Discontinue diuretics  Rule out HRS with Albumin 25g TID  Obtain urine studies  Check PETH    Will seek insurance authorization for transplant evaluation: patient is currently too ill for transplant, however will seek to risk stratify in case the patient improves clinically    Please obtain outside records from [facility] in [place] and the [facility].

## 2017-09-28 NOTE — PLAN OF CARE
09/28/17 1514   Discharge Assessment   Assessment Type Discharge Planning Assessment   Confirmed/corrected address and phone number on facesheet? No   Assessment information obtained from? Patient;Medical Record  (Patient unable to provide answers to d/c assessment)   Expected Length of Stay (days) 5   Communicated expected length of stay with patient/caregiver no   Prior to hospitilization cognitive status: Alert/Oriented   Prior to hospitalization functional status: Independent   Current cognitive status: Unable to Assess  (patient letharfic and unable to communicate appropriately)   Current Functional Status: Needs Assistance   Facility Arrived From: [facility]   Able to Return to Prior Arrangements unable to determine at this time (comments)   Is patient able to care for self after discharge? Unable to determine at this time (comments)   Who are your caregiver(s) and their phone number(s)? Kaila Schwarz (Friend) [phone]   CM will continue to follow and obtain further information as patient becomes more appropriate.      Nicole Newell, RN, BSN  Case Management  [facility]  Ext. [phone]

## 2017-09-28 NOTE — CONSULTS
"RN generated nutr consult received for "advance directive". Nutr services/RD does not handle advance directives. Please consult appropriate person.   "

## 2017-09-28 NOTE — ASSESSMENT & PLAN NOTE
-pt had an episode of hematemesis at OSH   -underwent EGD that revealed 1 small varix, reflux esophagitis, and gastropathy of portal hypertension  -H/H stable at 10   -continue to monitor closely for blood loss  -will continue PPI

## 2017-09-28 NOTE — ASSESSMENT & PLAN NOTE
- Unable to perform risk assessment at this time due to pt's current mental status  - In the event that liver transplant is performed emergently during this hospitalization, we will provide recommendations for rehab/sobriety on the back end  - Will continue to follow and provide recommendations once pt's mentation improves

## 2017-09-28 NOTE — HPI
"26 yo male with hx of childhood asthma and heavy alcohol abuse (fifth of liquor daily since teenager, last drink 2.5 wks ago per mother) who [place] [facility] [place] transplant evaluation.    History obtained per chart review and discussion with mother: Patient initially presented to OSH for a 2 mo hx of progressively worsening abdominal pain with associated fatigue, jaundice, and abdominal distention with ~20# weight gain.     The patient's mother states he began having abdominal pain about 6 months ago.  States he underwent many tests, all of which were inconclusive.  He then began to have abdominal swelling about two months ago and was in&out of the local ER.  He was still actively drinking up until about 2.5 weeks ago according to the mother but she is unsure of this.  At the OSH the patient underwent an EGD which showed g1EV.  He was also diagnosed with "pancreatitis" based on an elevated lipase.  The patient was also placed on prednisone given concerns for alcoholic hepatitis.         "

## 2017-09-28 NOTE — HPI
Jhonny Diana is a 28 yo male with pMHX of childhood asthma and alcohol abuse who was was transferred to [facility] for hepatology evaluation for worsening liver functions.  Information was obtained from family at bedside and review of outside records, given that patient is obtunded.  According to mother at bedside, patient has had multiple admissions to the hospital over the past 6 months for recurrent abdominal pain.  She reports that he has been been seen the ED and has been diagnosed with diverticulitis, UTIs, gastroenteritis, and cholecystitis.  He was never admitted to the hospital and was always discharged.  On 09/06, patient presented to OSH for worsening abdominal pain and distension.  He was diagnosed at that time with ETOH hepatitis.  He was treated with steroids, ceftriaxone for SBP prophylaxis, and diuresed at that time for volume management.  He was discharged home on the 15th, after clearance from GI,a nd was instructed to follow-up with his PCP 1 week after discharge.  He presented to the hospital on 09/25 for worsening abdominal pain and distension with 20 kg weight gain.  He was transferred to [facility] for further care and Nephrology was consulted for LAURA.    On review of outside records, patient Scr on admission was 1.5 (with baseline at 1.0).  According to the family at the bedside, he was not urinating well at the OSH, and not responding to diuretics (spirionolactone/lasix combo).  His mother reports that patient continued to c/o abdominal and inability to urinate, but no chicas was placed until transfer to [facility] yesterday.  Record reviews shows relative hypotension with BPs in the low 90's.  His mother reports that the nursing staff was administrating his narcotic regimen based on his blood pressures, and at times, medication was held due to hypotension.  Since admission to hospital, his UOP has improved, responding well in Lasix 40 IV (770 ml since admission, 700 ml so far this shift at time of consultation).   Admit labs revealed a SCr of 2.6, this morning improved down to 2.4.  Bilirubin remains elevated, >14.  Abdomen remains with tight distension, unable to perform paracentesis, as no significant fluid pocket found.  Mother reports patient did not have para's done at OSH.    Labs on the morning of 09/27 with bicarb of 15.  ABG with alkalemia and primary respiratory alkalosis and anion gap acidosis.

## 2017-09-28 NOTE — ASSESSMENT & PLAN NOTE
-multifactorial (renal/liver dysfunction)  -will diurese and closely monitor labs/urine output   -lasix 40 mg daily and aldactone 100 mg daily, uptitrate as indicated

## 2017-09-28 NOTE — H&P
"[facility]  Critical Care Medicine  History & Physical    Patient Name: Jhonny Diana  MRN: [id number]  Admission Date: 9/27/2017  Hospital Length of Stay: 1 days  Code Status: Full Code  Attending Physician: Kim Birmingham MD   Primary Care Provider: Provider Notinsystem   Principal Problem: Liver failure    Subjective:     HPI:  28 yo male with hx of childhood asthma and heavy alcohol abuse (fifth of liquor daily since teenager, last drink 2.5 wks ago per mother) who [place] [facility] [place] evaluation. Patient initially presented to OSH for a 2 mo hx of progressively worsening abdominal pain with associated fatigue, jaundice, and abdominal distention with ~20# weight gain. HPI given by patient's mother and OSH records as patient's participation in interview limited by previous administration of pain medication at OSH.     Per mother, patient did not have a fever, chills, dark stools, known sick contacts, nausea, vomiting, diarrhea, confusion, or HA. Reports family hx of alcoholic cirrhosis in father. Pt was recently diagnosed with alcoholic hepatitis during his admission at OSH on 9/6-9/15. During that hospitalization, patient was given ceftriaxone for SBP ppx, prednisone for alcoholic hepatitis, and diuresed. Pt was discharged and instructed to follow up with PCP and AA within 1 wk. After his discharge, patient returned to ED 4 days after for increased weight gain and worsening abdominal pain/jaundice. At OSH, patient was given fluids (for suspected pancreatitis), diuresed and given IV albumin. Two days prior to [place] [facility] had an episode of hematemesis and underwent an EGD on 9/25 that revealed "1 sm varix in distal esophagus, no stigmata of bleeding, gastropathy of portal hypertension, and reflux esophagitis".  No further episodes of hematemesis noted since initial episode.       Recent labs at OSH: Na 134, CO2 18, BUN 36, Cr 2, Tprot 5.9, " albumin 1.9, Tbili 24, alk phos 83, , ALT51. Patient also had lipase 532 and ammonia 118 during his previous hospitalization at OSH. Labwork also notable for negative mitochondrial ab, sm muscle ab, and ceruloplasmin 22. Imaging at OSH revealing abdo U/S (9/20): hepatosplenomegaly, CT abdo w contrast: hepatomegaly with marked fatty infiltration of the liver and mild ascites (9/6). No CDs available for review.          Hospital/ICU Course:  No notes on file     Past Medical History:   Diagnosis Date    Hypertension        Past Surgical History:   Procedure Laterality Date    APPENDECTOMY         Review of patient's allergies indicates:   Allergen Reactions    Bactrim [sulfamethoxazole-trimethoprim] Other (See Comments)     Mookie Trell Syndrome       Family History     None        Social History Main Topics    Smoking status: Former Smoker     Types: Cigarettes    Smokeless tobacco: Never Used    Alcohol use Yes      Comment: a fifth of liquor daily for years, cut back over last 2 months    Drug use: No    Sexual activity: Not on file      Review of Systems   Constitutional: Negative for chills and fever.   HENT: Negative for sore throat.    Eyes: Negative for visual disturbance.        Scleral icterus     Respiratory: Negative for cough and shortness of breath.    Cardiovascular: Positive for leg swelling. Negative for chest pain and palpitations.   Gastrointestinal: Positive for abdominal pain and constipation. Negative for diarrhea, nausea and vomiting.        Hx of hematemesis 2 days ago, none since   Genitourinary: Negative for dysuria and hematuria.   Musculoskeletal: Negative for arthralgias.   Skin:        Jaundice     Neurological: Positive for weakness. Negative for dizziness and headaches.   Hematological: Negative for adenopathy. Does not bruise/bleed easily.   Psychiatric/Behavioral: Negative for confusion.     Objective:     Vital Signs (Most Recent):  Temp: 99.1 °F (37.3 °C) (09/27/17  2327)  Pulse: (!) 117 (09/28/17 0045)  Resp: (!) 29 (09/28/17 0045)  BP: 118/73 (09/28/17 0045)  SpO2: 97 % (09/28/17 0045) Vital Signs (24h Range):  Temp:  [99.1 °F (37.3 °C)] 99.1 °F (37.3 °C)  Pulse:  [113-117] 117  Resp:  [18-29] 29  SpO2:  [94 %-97 %] 97 %  BP: (117-124)/(57-73) 118/73   Weight: 97.1 kg (214 lb 1.1 oz)  Body mass index is 33.53 kg/m².      Intake/Output Summary (Last 24 hours) at 09/28/17 0108  Last data filed at 09/27/17 2345   Gross per 24 hour   Intake                0 ml   Output              125 ml   Net             -125 ml       Physical Exam   HENT:   Dry oral mucosa   Eyes: EOM are normal. Pupils are equal, round, and reactive to light. Scleral icterus is present.   Neck: JVD present.   Cardiovascular: Regular rhythm and normal heart sounds.    No murmur heard.  Pulmonary/Chest: Effort normal and breath sounds normal.   Abdominal: He exhibits distension. He exhibits no mass. There is tenderness (RUQ). There is no rebound and no guarding.   Hypoactive generalized BS     Musculoskeletal: He exhibits edema (3+ pitting edema to thighs BL).   Lymphadenopathy:     He has no cervical adenopathy.   Neurological:   Oriented to person and place  Intermittently follows commands  Lethargic but awakens to voice   Skin: Capillary refill takes less than 2 seconds.   Jaundice         Vents:     Lines/Drains/Airways     Drain                 Urethral Catheter 09/27/17 1 day          Peripheral Intravenous Line                 Peripheral IV - Single Lumen Left Hand -- days         Midline Catheter Insertion/Assessment  - Single Lumen 09/27/17 1800 Right less than 1 day              Significant Labs:    CBC/Anemia Profile:    Recent Labs  Lab 09/27/17  2340   WBC 17.73*   HGB 10.9*   HCT 30.8*      *   RDW 14.6*        Chemistries:    Recent Labs  Lab 09/27/17  2340   *   K 3.5      CO2 14*   BUN 47*   CREATININE 2.6*   CALCIUM 9.0   ALBUMIN 2.3*   PROT 6.6   BILITOT 30.0*    ALKPHOS 98   ALT 52*   *   MG 1.9   PHOS 5.5*       ABGs: No results for input(s): PH, PCO2, HCO3, POCSATURATED, BE in the last 48 hours.  Bilirubin:   Recent Labs  Lab 09/27/17  2340   BILITOT 30.0*     Blood Culture: No results for input(s): LABBLOO in the last 48 hours.  Coagulation:   Recent Labs  Lab 09/27/17 2340   INR 1.6*   APTT 31.1     Lactic Acid:   Recent Labs  Lab 09/27/17  2340   LACTATE 1.1     Urine Culture: No results for input(s): LABURIN in the last 48 hours.  Urine Studies: No results for input(s): COLORU, APPEARANCEUA, PHUR, SPECGRAV, PROTEINUA, GLUCUA, KETONESU, BILIRUBINUA, OCCULTUA, NITRITE, UROBILINOGEN, LEUKOCYTESUR, RBCUA, WBCUA, BACTERIA, SQUAMEPITHEL, HYALINECASTS in the last 48 hours.    Invalid input(s): WRIGHTSUR    Significant Imaging: pending CXR, abdominal U/S    Assessment/Plan:     Neuro   Encephalopathy, metabolic    -likely 2/2 to pt's liver dysfunction  -Ammonia at   -follow up ammonia   -titrate lactulose for 3-4 BMs daily          Psychiatric   Alcoholism /alcohol abuse    -fifth of whiskey daily since patient was a teenager  -last drink per mother 2.5 wks ago  -denies hx of WD  -monitor for alcohol WD sx  -CIWA  -addiction psychiatry for alcohol abuse  -follow up PETH        Renal/   Increased anion gap metabolic acidosis    -suspect 2/2 to renal failure  -lactic acid WNL  -continue to monitor  -follow up ABG  -follow up BH (?starvation ketosis)        Hyponatremia    Likely 2/2 to anasarca  -continue to monitor  -fluid restriction         Acute renal failure    -worsening renal function    -Cr at OSH 1.5, Cr on admit 2.6 with elevated BUN  -follow up UA, urine lytes and complete abdo U/S  -strict I/Os  -chicas present on transfer (will exchange) with good UOP  -suspect HRS given liver failure; however, will rule out other etiologies for ARF.  -consider nephrology consult in AM, no acute indication for RRT at this time          GI   * Liver failure     -likely due to decompensated alcoholic hepatitis  -will rule out infectious etiology given rising WCC and organ dysfunction   -follow up bl cx/urine cx/cxr/abdo U/S   -broad spectrum abx (vanc and zosyn) to cover for hospital acquired infection (given multiple admission to hospital) and intra-abdominal etiologies, de-escalate abx as indicated   -evaluated abdomen for fluid pocket for paracentesis at bedside with patient supine and rolled on side; however no safe pocket visualized with ultrasound   -consider IR para in AM  -closely monitor liver enzymes  -MELD-Na score: 35 at 9/27/2017 11:40 PM  MELD score: 34 at 9/27/2017 11:40 PM  Calculated from:  Serum Creatinine: 2.6 mg/dL at 9/27/2017 11:40 PM  Serum Sodium: 133 mmol/L at 9/27/2017 11:40 PM  Total Bilirubin: 30.0 mg/dL at 9/27/2017 11:40 PM  INR(ratio): 1.6 at 9/27/2017 11:40 PM  Age: 27 years  -Maddrey's discriminatory function was 43, will hold off on steroids now as work up for active infection underway.   -follow up hepatitis panel  -OSH workup: negative jae ab, ceruloplasmin, and sm muscle ab; imaging at OSH notable for hepatosplenomegaly (although no CDs were sent for review)  -will consult hepatology for further recommendations/liver transplant workup        History of hematemesis    -pt had an episode of hematemesis at OSH   -underwent EGD that revealed 1 small varix, reflux esophagitis, and gastropathy of portal hypertension  -H/H stable at 10   -continue to monitor closely for blood loss  -will continue PPI         Other   Anasarca    -multifactorial (renal/liver dysfunction)  -will diurese and closely monitor labs/urine output   -lasix 40 mg daily and aldactone 100 mg daily, uptitrate as indicated            Critical Care Daily Checklist:    A: Awake: RASS Goal/Actual Goal:    Actual: Robert Agitation Sedation Scale (RASS): Restless   B: Spontaneous Breathing Trial Performed?  NA   C: SAT & SBT Coordinated?  NA                      D: Delirium:  CAM-ICU Overall CAM-ICU: Positive   E: Early Mobility Performed? No   F: Feeding Goal:    Status:     Current Diet Order   Procedures    Diet NPO Except for: Sips with Medication     Until abdo ultrasound is completed, then can start on sodium restricted/renal diet with 1L fluid restriction     Order Specific Question:   Except for     Answer:   Sips with Medication      AS: Analgesia/Sedation none   T: Thromboembolic Prophylaxis Mechanical for now given recent hematemesis, will consider heparin if pt does not have further s/s of bleed   H: HOB > 300 Yes   U: Stress Ulcer Prophylaxis (if needed) PPI    G: Glucose Control monitor   B: Bowel Function lactulose   I: Indwelling Catheter (Lines & Garcia) Necessity Garcia, midline, and PIV   D: De-escalation of Antimicrobials/Pharmacotherapies Zosyn and vanc  -de-escalate as indicated    Plan for the day/ETD Follow up hepatology/psych consult, monitor renal/hepatic function    Code Status:  Family/Goals of Care: Full Code       Discussed plan with CCS fellow. Staff attestation to follow in AM.       TED Tuttle  Critical Care Medicine  [facility]

## 2017-09-28 NOTE — CONSULTS
[facility]  Nephrology  Consult Note    Patient Name: Jhonny Diana  MRN: [id number]  Admission Date: 9/27/2017  Hospital Length of Stay: 1 days  Attending Provider: Sajan Sandra MD   Primary Care Physician: Provider Notinsystem  Principal Problem:Alcoholic hepatitis without ascites    Inpatient consult to Nephrology  Consult performed by: ERASMO MCCLELLAN  Consult ordered by: MAGALI LEWIS IV  Reason for consult: Non-Oliguric LAURA        Subjective:     HPI: Jhonny Diana is a 26 yo male with pMHX of childhood asthma and alcohol abuse, was transferred [place] [facility] [place]ology evaluation for worsening liver functions.  Information was obtained from family at bedside and review of outside records, given that patient is obtunded.  According to mother at bedside, patient has had multiple admissions to the hospital over the past 6 months for recurrent abdominal pain.  She reports that he has been been seen the ED and has been diagnosed with diverticulitis, UTIs, gastroenteritis, and cholecystitis.  He was never admitted to the hospital and was always discharged.  On 09/06, patient presented to OSH for worsening abdominal pain and distension.  He was diagnosed at that time with ETOH hepatitis.  He was treated with steroids, ceftriaxone for SBP prophylaxis, and diuresed at that time for volume management.  He was discharged home on the 15th, after clearance from GI,a nd was instructed to follow-up with his PCP 1 week after discharge.  He presented to the hospital on 09/25 for worsening abdominal pain and distension with 20 kg weight gain.  He was transferred to [facility] [place] care and Nephrology was consulted for LAURA.    On review of outside records, patient Scr on admission was 1.5 (with baseline at 1.0).  According to the family at the bedside, he was not urinating well at the OSH, and not responding to diuretics (spirionolactone/lasix combo).  His mother reports that patient continued to c/o  abdominal and inability to urinate, but no chicas was placed until [facility] yesterday.  Record reviews shows relative hypotension with BPs in the low 90's.  His mother reports that the nursing staff was administrating his narcotic regimen based on his blood pressures, and at times, medication was held due to hypotension.  Since admission to hospital, his UOP has improved, responding well in Lasix 40 IV (770 ml since admission, 700 ml so far this shift at time of consultation).  Admit labs revealed a SCr of 2.6, this morning improved down to 2.4.  Bilirubin remains elevated, >14.  Abdomen remains with tight distension, unable to perform paracentesis, as no significant fluid pocket found.  Mother reports patient did not have para's done at OSH.    Labs on the morning of 09/27 with bicarb of 15.  ABG with alkalemia and primary respiratory alkalosis and anion gap acidosis.     Past Medical History:   Diagnosis Date    Hypertension        Past Surgical History:   Procedure Laterality Date    APPENDECTOMY         Review of patient's allergies indicates:   Allergen Reactions    Bactrim [sulfamethoxazole-trimethoprim] Other (See Comments)     Mookie Trell Syndrome     Current Facility-Administered Medications   Medication Frequency    famotidine (PF) injection 20 mg BID    fluconazole (DIFLUCAN) IVPB 400 mg 200 mL Q24H    lactulose 10 gram/15 mL solution (enema) 200 g TID    [START ON 9/29/2017] methylPREDNISolone sodium succinate injection 40 mg Daily    ondansetron injection 4 mg Q6H PRN    piperacillin-tazobactam 4.5 g in dextrose 5 % 100 mL IVPB (ready to mix system) Q8H    pneumoc 13-arianne conj-dip cr(PF) 0.5 mL vaccine x 1 dose    sodium chloride 0.9% flush 3 mL Q8H     Family History     None        Social History Main Topics    Smoking status: Former Smoker     Types: Cigarettes    Smokeless tobacco: Never Used    Alcohol use Yes      Comment: a fifth of liquor daily for years, cut back over last  2 months    Drug use: No    Sexual activity: Not on file     Review of Systems   Unable to perform ROS: Mental status change     Objective:     Vital Signs (Most Recent):  Temp: 98.9 °F (37.2 °C) (09/28/17 0701)  Pulse: (!) 118 (09/28/17 1200)  Resp: (!) 40 (09/28/17 1200)  BP: 129/62 (09/28/17 1200)  SpO2: 97 % (09/28/17 1200)  O2 Device (Oxygen Therapy): room air (09/28/17 0701) Vital Signs (24h Range):  Temp:  [98.9 °F (37.2 °C)-99.3 °F (37.4 °C)] 98.9 °F (37.2 °C)  Pulse:  [112-121] 118  Resp:  [18-40] 40  SpO2:  [94 %-97 %] 97 %  BP: (116-139)/(57-83) 129/62     Weight: 97.1 kg (214 lb 1.1 oz) (09/27/17 2327)  Body mass index is 33.53 kg/m².  Body surface area is 2.14 meters squared.    I/O last 3 completed shifts:  In: 355 [I.V.:5; IV Piggyback:350]  Out: 1270 [Urine:770; Stool:500]    Physical Exam   Constitutional: He appears well-developed and well-nourished. He appears lethargic. He appears distressed.   HENT:   Head: Normocephalic and atraumatic.   Eyes: EOM are normal. Scleral icterus is present.   Neck: Normal range of motion. Neck supple.   Cardiovascular: Normal rate and regular rhythm.  Exam reveals no gallop and no friction rub.    No murmur heard.  Pulmonary/Chest: Tachypnea noted. No respiratory distress. He has decreased breath sounds in the right lower field and the left lower field. He has no wheezes. He has no rales.   Abdominal: He exhibits distension and ascites. There is tenderness.   Musculoskeletal: He exhibits edema (+4 lower extremities).   Neurological: He appears lethargic. He is disoriented.   Skin: Skin is warm and dry. No rash noted. He is not diaphoretic. No erythema.       Significant Labs:  ABGs:   Recent Labs  Lab 09/28/17  0200   PH 7.467*   PCO2 24.4*   HCO3 17.7*   POCSATURATED 97   BE -6     CBC:   Recent Labs  Lab 09/28/17 0246   WBC 17.33*   RBC 2.82*   HGB 10.9*   HCT 30.1*      *   MCH 38.7*   MCHC 36.2*     CMP:   Recent Labs  Lab 09/28/17  0246   GLU  152*   CALCIUM 8.9   ALBUMIN 2.1*   PROT 6.4   *   K 3.4*   CO2 15*      BUN 47*   CREATININE 2.4*   ALKPHOS 100   ALT 51*   *   BILITOT 29.4*         Assessment/Plan:     Acute renal failure    Non-Oliguric LAURA on Normal Renal Function  -Baseline SCr ~1.0.  Admitted to OSH with SCr of 1.5 and slowly increased through the hospitalization.  Upon arrival at [facility], SCr at 2.6.  -His LAURA is not related to HRS as patient is non-oliguric and normotensive.    -LAURA 2/2 ATN, likely multifactorial.     -Toxic ATN from hyperbilirubinemia and bile salt toxicity vs. Ischemic ATN, as patients  mother reported lower BP's while at OSH when receiving narcotics for pain.    -Urine microscopy with numerous muddy brown casts stained with bili, confirming ATN  -He also has tense ascites, bladder pressures ordered with elevation of 33.  Can not rule out a abdominal compartment syndrome leading to renal vascular congestion.  CT scan scheduled later today.    -recommend lasix 40 mg IV QD.  Up-titrated as needed for volume management of hypervolemia.  -Renal US  -no need for RRT at this time, will continue following.            Increased anion gap metabolic acidosis    Anion Gap Metabolic Acidosis  -Corrected AG of 19 given hypoalbuminemia  -bicarb this morning on renal panel of 15.  -Alkalemia present on ABG last night.  -recommend repeat of ABG  -he has a primary respiratory alkalosis with underlying metabolic acidosis.    -No treatment from renal standpoint is needed at this time.  Will need close monitoring for a decline in his respiratory status.        Hyponatremia    Hypervolemic Hyponatremia  -UOSM/SOSM  -Likely 2/2 ADH secretion from decreased renal perfusion due to tense ascites.  -recommend lasix 40 mg IV to aid in free water excretion.          Derrick Love, SAMREEN  Nephrology  [facility]  Pager:  [phone]

## 2017-09-28 NOTE — CONSULTS
"[facility]  Hepatology  Consult Note    Patient Name: Jhonny Diana  MRN: [id number]  Admission Date: 9/27/2017  Hospital Length of Stay: 1 days  Attending Provider: Sajan Sandra MD   Primary Care Physician: Provider Notinsystem  Principal Problem:Alcoholic hepatitis without ascites    Inpatient consult to Hepatology  Consult performed by: SCOOBY DEAN  Consult ordered by: NAEEM NATH        Subjective:     Transplant status: No    HPI:  28 yo male with hx of childhood asthma and heavy alcohol abuse (fifth of liquor daily since teenager, last drink 2.5 wks ago per mother) who was transferred to [facility] for higher level of care/liver transplant evaluation.    History obtained per chart review and discussion with mother: Patient initially presented to OSH for a 2 mo hx of progressively worsening abdominal pain with associated fatigue, jaundice, and abdominal distention with ~20# weight gain.     The patient's mother states he began having abdominal pain about 6 months ago.  States he underwent many tests, all of which were inconclusive.  He then began to have abdominal swelling about two months ago and was in&out of the local ER.  He was still actively drinking up until about 2.5 weeks ago according to the mother but she is unsure of this.  At the OSH the patient underwent an EGD which showed g1EV.  He was also diagnosed with "pancreatitis" based on an elevated lipase.  The patient was also placed on prednisone given concerns for alcoholic hepatitis.           Review of Systems   Unable to perform ROS: Mental status change       Past Medical History:   Diagnosis Date    Hypertension        Past Surgical History:   Procedure Laterality Date    APPENDECTOMY         Family history of liver disease: No    Review of patient's allergies indicates:   Allergen Reactions    Bactrim [sulfamethoxazole-trimethoprim] Other (See Comments)     Mookie Trell Syndrome       Social History Main " Topics    Smoking status: Former Smoker     Types: Cigarettes    Smokeless tobacco: Never Used    Alcohol use Yes      Comment: a fifth of liquor daily for years, cut back over last 2 months    Drug use: No    Sexual activity: Not on file       No prescriptions prior to admission.       Objective:     Vital Signs (Most Recent):  Temp: 98.9 °F (37.2 °C) (09/28/17 0701)  Pulse: (!) 118 (09/28/17 1200)  Resp: (!) 40 (09/28/17 1200)  BP: 129/62 (09/28/17 1200)  SpO2: 97 % (09/28/17 1200) Vital Signs (24h Range):  Temp:  [98.9 °F (37.2 °C)-99.3 °F (37.4 °C)] 98.9 °F (37.2 °C)  Pulse:  [112-121] 118  Resp:  [18-40] 40  SpO2:  [94 %-97 %] 97 %  BP: (116-139)/(57-83) 129/62     Weight: 97.1 kg (214 lb 1.1 oz) (09/27/17 2327)  Body mass index is 33.53 kg/m².    Physical Exam   Constitutional: No distress.   obtunded   HENT:   Mouth/Throat: No oropharyngeal exudate.   Eyes: Conjunctivae are normal. Scleral icterus is present.   Neck: Neck supple. No tracheal deviation present.   Cardiovascular: Regular rhythm and normal heart sounds.  Exam reveals no gallop and no friction rub.    No murmur heard.  tachycardic   Pulmonary/Chest: Effort normal. No respiratory distress. He has no wheezes. He has rales (scattered).   Audible grunting, occasional productive cough   Abdominal: Soft. Bowel sounds are normal. He exhibits distension. There is tenderness. There is no guarding.   Neurological:   Obtunded, does not answer questions, not oriented   Skin: Skin is warm and dry. He is not diaphoretic.   icteric       MELD-Na score: 34 at 9/28/2017  2:46 AM  MELD score: 33 at 9/28/2017  2:46 AM  Calculated from:  Serum Creatinine: 2.4 mg/dL at 9/28/2017  2:46 AM  Serum Sodium: 133 mmol/L at 9/28/2017  2:46 AM  Total Bilirubin: 29.4 mg/dL at 9/28/2017  2:46 AM  INR(ratio): 1.6 at 9/27/2017 11:40 PM  Age: 27 years    Significant Labs:  CBC:   Recent Labs  Lab 09/28/17  0246   WBC 17.33*   RBC 2.82*   HGB 10.9*   HCT 30.1*         CMP:   Recent Labs  Lab 09/28/17  0246   *   CALCIUM 8.9   ALBUMIN 2.1*   PROT 6.4   *   K 3.4*   CO2 15*      BUN 47*   CREATININE 2.4*   ALKPHOS 100   ALT 51*   *   BILITOT 29.4*     Coagulation:   Recent Labs  Lab 09/27/17  2340   INR 1.6*   APTT 31.1       Significant Imaging:  Labs: Reviewed  .    Assessment/Plan:     Decompensated hepatic cirrhosis    Patient critically ill with obtundation and evidence of liver failure.    Perform diagnostic paracentesis; send fluid for Albumin, Total Protein, Cell Count & Diff, Gram Stain, Culture.   Appreciate Addiction Psych consult  Discontinue diuretics  Rule out HRS with Albumin 25g TID  Obtain urine studies  Check PETH    Will seek insurance authorization for transplant evaluation: patient is currently too ill for transplant, however will seek to risk stratify in case the patient improves clinically    Please obtain outside records from [facility] [place] and the [facility].               Thank you for your consult. I will follow-up with patient. Please contact us if you have any additional questions.    Bairon Tam   Gastroenterology Fellow PGY VI  [phone]

## 2017-09-28 NOTE — HPI
"26 yo WM s/p liver transplant secondary to alcoholic cirrhosis post op day 14. Patient was initially seen as part of pre transplant workup. He will come to the ABU program to begin treatment of alcohol use disorder when discharge from transplant service. Psychiatry consulted at this time due to concern for delirium, anxiety.     Patient seen with mother at bedside, chart and labs reviewed. At this time patient and his mother deny any episodes of confusion stating, "that was much worse before the transplant." Patient is awake, alert and oriented to person, place, time and situation. He reports episodes of anxiety and irritability attributed to, "not being as independent as I was before, I want to be that again" and continued drainage from previous luke drain site that is causing distress. Patient states he would, "like to learn some coping skills like CBT to deal with my anxiety." Patient reports feeling "very tired" and irritable after dialysis." States this usually last until the next day. Discussed current medications including opiates and immunosuppressants and their psychiatric side effects.   "

## 2017-09-28 NOTE — ASSESSMENT & PLAN NOTE
-suspect 2/2 to renal failure  -lactic acid WNL  -continue to monitor  -follow up ABG  -follow up BH (?starvation ketosis)

## 2017-09-28 NOTE — SUBJECTIVE & OBJECTIVE
Past Medical History:   Diagnosis Date    Hypertension        Past Surgical History:   Procedure Laterality Date    APPENDECTOMY         Review of patient's allergies indicates:   Allergen Reactions    Bactrim [sulfamethoxazole-trimethoprim] Other (See Comments)     Mookie Trell Syndrome     Current Facility-Administered Medications   Medication Frequency    famotidine (PF) injection 20 mg BID    fluconazole (DIFLUCAN) IVPB 400 mg 200 mL Q24H    lactulose 10 gram/15 mL solution (enema) 200 g TID    [START ON 9/29/2017] methylPREDNISolone sodium succinate injection 40 mg Daily    ondansetron injection 4 mg Q6H PRN    piperacillin-tazobactam 4.5 g in dextrose 5 % 100 mL IVPB (ready to mix system) Q8H    pneumoc 13-arianne conj-dip cr(PF) 0.5 mL vaccine x 1 dose    sodium chloride 0.9% flush 3 mL Q8H     Family History     None        Social History Main Topics    Smoking status: Former Smoker     Types: Cigarettes    Smokeless tobacco: Never Used    Alcohol use Yes      Comment: a fifth of liquor daily for years, cut back over last 2 months    Drug use: No    Sexual activity: Not on file     Review of Systems   Unable to perform ROS: Mental status change     Objective:     Vital Signs (Most Recent):  Temp: 98.9 °F (37.2 °C) (09/28/17 0701)  Pulse: (!) 118 (09/28/17 1200)  Resp: (!) 40 (09/28/17 1200)  BP: 129/62 (09/28/17 1200)  SpO2: 97 % (09/28/17 1200)  O2 Device (Oxygen Therapy): room air (09/28/17 0701) Vital Signs (24h Range):  Temp:  [98.9 °F (37.2 °C)-99.3 °F (37.4 °C)] 98.9 °F (37.2 °C)  Pulse:  [112-121] 118  Resp:  [18-40] 40  SpO2:  [94 %-97 %] 97 %  BP: (116-139)/(57-83) 129/62     Weight: 97.1 kg (214 lb 1.1 oz) (09/27/17 2327)  Body mass index is 33.53 kg/m².  Body surface area is 2.14 meters squared.    I/O last 3 completed shifts:  In: 355 [I.V.:5; IV Piggyback:350]  Out: 1270 [Urine:770; Stool:500]    Physical Exam   Constitutional: He appears well-developed and well-nourished. He  appears lethargic. He appears distressed.   HENT:   Head: Normocephalic and atraumatic.   Eyes: EOM are normal. Scleral icterus is present.   Neck: Normal range of motion. Neck supple.   Cardiovascular: Normal rate and regular rhythm.  Exam reveals no gallop and no friction rub.    No murmur heard.  Pulmonary/Chest: Tachypnea noted. No respiratory distress. He has decreased breath sounds in the right lower field and the left lower field. He has no wheezes. He has no rales.   Abdominal: He exhibits distension and ascites. There is tenderness.   Musculoskeletal: He exhibits edema (+4 lower extremities).   Neurological: He appears lethargic. He is disoriented.   Skin: Skin is warm and dry. No rash noted. He is not diaphoretic. No erythema.       Significant Labs:  ABGs:   Recent Labs  Lab 09/28/17  0200   PH 7.467*   PCO2 24.4*   HCO3 17.7*   POCSATURATED 97   BE -6     CBC:   Recent Labs  Lab 09/28/17  0246   WBC 17.33*   RBC 2.82*   HGB 10.9*   HCT 30.1*      *   MCH 38.7*   MCHC 36.2*     CMP:   Recent Labs  Lab 09/28/17  0246   *   CALCIUM 8.9   ALBUMIN 2.1*   PROT 6.4   *   K 3.4*   CO2 15*      BUN 47*   CREATININE 2.4*   ALKPHOS 100   ALT 51*   *   BILITOT 29.4*

## 2017-09-28 NOTE — ASSESSMENT & PLAN NOTE
-fifth of whiskey daily since patient was a teenager  -last drink per mother 2.5 wks ago  -denies hx of WD  -monitor for alcohol WD sx  -CIWA  -addiction psychiatry for alcohol abuse  -follow up PET

## 2017-09-28 NOTE — ASSESSMENT & PLAN NOTE
Non-Oliguric LAURA on Normal Renal Function  -Baseline SCr ~1.0.  Admitted to OSH with SCr of 1.5 and slowly increased through the hospitalization.  Upon arrival at [facility], SCr at 2.6.  -His LAURA is not related to HRS as patient is non-oliguric and normotensive.    -LAURA 2/2 ATN, likely multifactorial.     -Toxic ATN from hyperbilirubinemia and bile salt toxicity vs. Ischemic ATN, as patients  mother reported lower BP's while at OSH when receiving narcotics for pain.    -Urine microscopy with numerous muddy brown casts stained with bili, confirming ATN  -He also has tense ascites, bladder pressures ordered with elevation of 33.  Can not rule out a abdominal compartment syndrome leading to renal vascular congestion.  CT scan scheduled later today.    -recommend lasix 40 mg IV QD.  Up-titrated as needed for volume management of hypervolemia.  -Renal US  -no need for RRT at this time, will continue following.

## 2017-09-28 NOTE — PROGRESS NOTES
Received communication from Ericka Zimmer that patient financially cleared for transplant evaluation.  Spoke with Dr. Rogers who wants patient added for selection committee conference to discuss patient candidacy in light of addiction psych notes.

## 2017-09-29 ENCOUNTER — CONFERENCE (OUTPATIENT)
Dept: TRANSPLANT | Facility: CLINIC | Age: 27
End: 2017-09-29

## 2017-09-29 PROBLEM — E87.1 HYPONATREMIA: Status: RESOLVED | Noted: 2017-09-28 | Resolved: 2017-09-29

## 2017-09-29 PROBLEM — N17.0 ACUTE RENAL FAILURE WITH TUBULAR NECROSIS: Status: ACTIVE | Noted: 2017-09-28

## 2017-09-29 PROBLEM — J18.9 PNEUMONIA OF BOTH LOWER LOBES DUE TO INFECTIOUS ORGANISM: Status: ACTIVE | Noted: 2017-09-29

## 2017-09-29 PROBLEM — E87.6 HYPOKALEMIA: Status: ACTIVE | Noted: 2017-09-29

## 2017-09-29 LAB
ALBUMIN SERPL BCP-MCNC: 1.8 G/DL
ALP SERPL-CCNC: 102 U/L
ALT SERPL W/O P-5'-P-CCNC: 52 U/L
ANION GAP SERPL CALC-SCNC: 15 MMOL/L
ANION GAP SERPL CALC-SCNC: 15 MMOL/L
ANISOCYTOSIS BLD QL SMEAR: SLIGHT
AST SERPL-CCNC: 105 U/L
BACTERIA UR CULT: NO GROWTH
BASOPHILS # BLD AUTO: 0.01 K/UL
BASOPHILS NFR BLD: 0.1 %
BILIRUB SERPL-MCNC: 28.1 MG/DL
BUN SERPL-MCNC: 49 MG/DL
BUN SERPL-MCNC: 55 MG/DL
CALCIUM SERPL-MCNC: 8.1 MG/DL
CALCIUM SERPL-MCNC: 8.2 MG/DL
CHLORIDE SERPL-SCNC: 105 MMOL/L
CHLORIDE SERPL-SCNC: 106 MMOL/L
CO2 SERPL-SCNC: 15 MMOL/L
CO2 SERPL-SCNC: 17 MMOL/L
CREAT SERPL-MCNC: 2.1 MG/DL
CREAT SERPL-MCNC: 2.2 MG/DL
DIFFERENTIAL METHOD: ABNORMAL
EOSINOPHIL # BLD AUTO: 0 K/UL
EOSINOPHIL NFR BLD: 0 %
ERYTHROCYTE [DISTWIDTH] IN BLOOD BY AUTOMATED COUNT: 14.5 %
EST. GFR  (AFRICAN AMERICAN): 45.4 ML/MIN/1.73 M^2
EST. GFR  (AFRICAN AMERICAN): 48.1 ML/MIN/1.73 M^2
EST. GFR  (NON AFRICAN AMERICAN): 39.3 ML/MIN/1.73 M^2
EST. GFR  (NON AFRICAN AMERICAN): 41.6 ML/MIN/1.73 M^2
GLUCOSE SERPL-MCNC: 120 MG/DL
GLUCOSE SERPL-MCNC: 135 MG/DL
HCT VFR BLD AUTO: 29.7 %
HGB BLD-MCNC: 10.7 G/DL
INR PPP: 1.9
LYMPHOCYTES # BLD AUTO: 0.8 K/UL
LYMPHOCYTES NFR BLD: 4.3 %
MAGNESIUM SERPL-MCNC: 2.1 MG/DL
MAGNESIUM SERPL-MCNC: 2.1 MG/DL
MCH RBC QN AUTO: 38.5 PG
MCHC RBC AUTO-ENTMCNC: 36 G/DL
MCV RBC AUTO: 107 FL
MONOCYTES # BLD AUTO: 1 K/UL
MONOCYTES NFR BLD: 5.4 %
NEUTROPHILS # BLD AUTO: 16.8 K/UL
NEUTROPHILS NFR BLD: 90.2 %
OSMOLALITY SERPL: 301 MOSM/KG
OSMOLALITY UR: 474 MOSM/KG
PHOSPHATE SERPL-MCNC: 3.6 MG/DL
PLATELET # BLD AUTO: 244 K/UL
PLATELET BLD QL SMEAR: ABNORMAL
PMV BLD AUTO: 10.5 FL
POTASSIUM SERPL-SCNC: 2.8 MMOL/L
POTASSIUM SERPL-SCNC: 3.3 MMOL/L
PROT SERPL-MCNC: 5.9 G/DL
PROTHROMBIN TIME: 18.6 SEC
RBC # BLD AUTO: 2.78 M/UL
SODIUM SERPL-SCNC: 135 MMOL/L
SODIUM SERPL-SCNC: 138 MMOL/L
VANCOMYCIN SERPL-MCNC: 11 UG/ML
WBC # BLD AUTO: 18.73 K/UL

## 2017-09-29 PROCEDURE — 25000003 PHARM REV CODE 250: Performed by: INTERNAL MEDICINE

## 2017-09-29 PROCEDURE — 80048 BASIC METABOLIC PNL TOTAL CA: CPT

## 2017-09-29 PROCEDURE — 99232 SBSQ HOSP IP/OBS MODERATE 35: CPT | Mod: ,,, | Performed by: INTERNAL MEDICINE

## 2017-09-29 PROCEDURE — 85610 PROTHROMBIN TIME: CPT

## 2017-09-29 PROCEDURE — 25000003 PHARM REV CODE 250: Performed by: STUDENT IN AN ORGANIZED HEALTH CARE EDUCATION/TRAINING PROGRAM

## 2017-09-29 PROCEDURE — 83735 ASSAY OF MAGNESIUM: CPT

## 2017-09-29 PROCEDURE — 63600175 PHARM REV CODE 636 W HCPCS: Performed by: STUDENT IN AN ORGANIZED HEALTH CARE EDUCATION/TRAINING PROGRAM

## 2017-09-29 PROCEDURE — 84100 ASSAY OF PHOSPHORUS: CPT

## 2017-09-29 PROCEDURE — 83930 ASSAY OF BLOOD OSMOLALITY: CPT

## 2017-09-29 PROCEDURE — S0028 INJECTION, FAMOTIDINE, 20 MG: HCPCS | Performed by: STUDENT IN AN ORGANIZED HEALTH CARE EDUCATION/TRAINING PROGRAM

## 2017-09-29 PROCEDURE — 99233 SBSQ HOSP IP/OBS HIGH 50: CPT | Mod: ,,, | Performed by: INTERNAL MEDICINE

## 2017-09-29 PROCEDURE — 63600175 PHARM REV CODE 636 W HCPCS: Performed by: HOSPITALIST

## 2017-09-29 PROCEDURE — 83735 ASSAY OF MAGNESIUM: CPT | Mod: 91

## 2017-09-29 PROCEDURE — 83935 ASSAY OF URINE OSMOLALITY: CPT

## 2017-09-29 PROCEDURE — A4216 STERILE WATER/SALINE, 10 ML: HCPCS | Performed by: STUDENT IN AN ORGANIZED HEALTH CARE EDUCATION/TRAINING PROGRAM

## 2017-09-29 PROCEDURE — 63600175 PHARM REV CODE 636 W HCPCS: Performed by: INTERNAL MEDICINE

## 2017-09-29 PROCEDURE — 85025 COMPLETE CBC W/AUTO DIFF WBC: CPT

## 2017-09-29 PROCEDURE — 99291 CRITICAL CARE FIRST HOUR: CPT | Mod: ,,, | Performed by: INTERNAL MEDICINE

## 2017-09-29 PROCEDURE — 20000000 HC ICU ROOM

## 2017-09-29 PROCEDURE — 80202 ASSAY OF VANCOMYCIN: CPT

## 2017-09-29 PROCEDURE — 80053 COMPREHEN METABOLIC PANEL: CPT

## 2017-09-29 PROCEDURE — P9047 ALBUMIN (HUMAN), 25%, 50ML: HCPCS | Performed by: INTERNAL MEDICINE

## 2017-09-29 RX ORDER — POTASSIUM CHLORIDE 7.45 MG/ML
10 INJECTION INTRAVENOUS
Status: COMPLETED | OUTPATIENT
Start: 2017-09-29 | End: 2017-09-29

## 2017-09-29 RX ORDER — VANCOMYCIN HCL IN 5 % DEXTROSE 1.25 G/25
1250 PLASTIC BAG, INJECTION (ML) INTRAVENOUS ONCE
Status: COMPLETED | OUTPATIENT
Start: 2017-09-29 | End: 2017-09-29

## 2017-09-29 RX ORDER — POTASSIUM CHLORIDE 7.45 MG/ML
10 INJECTION INTRAVENOUS
Status: DISCONTINUED | OUTPATIENT
Start: 2017-09-29 | End: 2017-09-29

## 2017-09-29 RX ORDER — ALBUMIN HUMAN 250 G/1000ML
25 SOLUTION INTRAVENOUS EVERY 8 HOURS
Status: COMPLETED | OUTPATIENT
Start: 2017-09-29 | End: 2017-10-01

## 2017-09-29 RX ADMIN — POTASSIUM CHLORIDE 10 MEQ: 10 INJECTION, SOLUTION INTRAVENOUS at 07:09

## 2017-09-29 RX ADMIN — Medication 3 ML: at 09:09

## 2017-09-29 RX ADMIN — FAMOTIDINE 20 MG: 10 INJECTION, SOLUTION INTRAVENOUS at 08:09

## 2017-09-29 RX ADMIN — POTASSIUM CHLORIDE 10 MEQ: 10 INJECTION, SOLUTION INTRAVENOUS at 06:09

## 2017-09-29 RX ADMIN — POTASSIUM CHLORIDE 10 MEQ: 10 INJECTION, SOLUTION INTRAVENOUS at 09:09

## 2017-09-29 RX ADMIN — FLUCONAZOLE 400 MG: 2 INJECTION INTRAVENOUS at 09:09

## 2017-09-29 RX ADMIN — FUROSEMIDE 40 MG: 10 INJECTION, SOLUTION INTRAVENOUS at 08:09

## 2017-09-29 RX ADMIN — POTASSIUM CHLORIDE 10 MEQ: 10 INJECTION, SOLUTION INTRAVENOUS at 11:09

## 2017-09-29 RX ADMIN — PIPERACILLIN AND TAZOBACTAM 4.5 G: 4; .5 INJECTION, POWDER, FOR SOLUTION INTRAVENOUS at 12:09

## 2017-09-29 RX ADMIN — Medication 1250 MG: at 11:09

## 2017-09-29 RX ADMIN — Medication 3 ML: at 05:09

## 2017-09-29 RX ADMIN — POTASSIUM CHLORIDE 10 MEQ: 10 INJECTION, SOLUTION INTRAVENOUS at 08:09

## 2017-09-29 RX ADMIN — PIPERACILLIN AND TAZOBACTAM 4.5 G: 4; .5 INJECTION, POWDER, FOR SOLUTION INTRAVENOUS at 09:09

## 2017-09-29 RX ADMIN — POTASSIUM CHLORIDE 10 MEQ: 10 INJECTION, SOLUTION INTRAVENOUS at 02:09

## 2017-09-29 RX ADMIN — LACTULOSE 200 G: 10 SOLUTION ORAL at 05:09

## 2017-09-29 RX ADMIN — POTASSIUM CHLORIDE 10 MEQ: 10 INJECTION, SOLUTION INTRAVENOUS at 05:09

## 2017-09-29 RX ADMIN — POTASSIUM CHLORIDE 10 MEQ: 10 INJECTION, SOLUTION INTRAVENOUS at 12:09

## 2017-09-29 RX ADMIN — ALBUMIN (HUMAN) 25 G: 12.5 SOLUTION INTRAVENOUS at 09:09

## 2017-09-29 RX ADMIN — LACTULOSE 200 G: 10 SOLUTION ORAL at 06:09

## 2017-09-29 RX ADMIN — LACTULOSE 200 G: 10 SOLUTION ORAL at 09:09

## 2017-09-29 RX ADMIN — PIPERACILLIN AND TAZOBACTAM 4.5 G: 4; .5 INJECTION, POWDER, FOR SOLUTION INTRAVENOUS at 04:09

## 2017-09-29 NOTE — SUBJECTIVE & OBJECTIVE
Interval History:   JOCELIN, maintains good UOP with lasix QD dosing, net negative 1.1L/24h.  Kidney function slightly improved with a decrease in SCr to 2.2 from 2.4.  Hypokalemic on AM labs.    General surgery evaluated patient, not concerned at this time for abdominal compartment syndrome given adequate UOP.  Urine Na resulted at low normal, at 24, while would expect to be higher given that he was on diuretics.  Abdominal CT revealed no major findings except mesenteric and abdominal wall edema.      Review of patient's allergies indicates:   Allergen Reactions    Bactrim [sulfamethoxazole-trimethoprim] Other (See Comments)     Mookie Trell Syndrome     Current Facility-Administered Medications   Medication Frequency    famotidine (PF) injection 20 mg BID    fluconazole (DIFLUCAN) IVPB 400 mg 200 mL Q24H    furosemide injection 40 mg Daily    lactulose 10 gram/15 mL solution (enema) 200 g TID    ondansetron injection 4 mg Q6H PRN    piperacillin-tazobactam 4.5 g in dextrose 5 % 100 mL IVPB (ready to mix system) Q8H    pneumoc 13-arianne conj-dip cr(PF) 0.5 mL vaccine x 1 dose    sodium chloride 0.9% flush 3 mL Q8H       Objective:     Vital Signs (Most Recent):  Temp: 98.9 °F (37.2 °C) (09/29/17 0701)  Pulse: (!) 113 (09/29/17 0800)  Resp: (!) 29 (09/29/17 0800)  BP: 139/65 (09/29/17 0800)  SpO2: 96 % (09/29/17 0800)  O2 Device (Oxygen Therapy): room air (09/29/17 0701) Vital Signs (24h Range):  Temp:  [98.3 °F (36.8 °C)-100 °F (37.8 °C)] 98.9 °F (37.2 °C)  Pulse:  [107-119] 113  Resp:  [22-40] 29  SpO2:  [94 %-98 %] 96 %  BP: (111-142)/(54-69) 139/65     Weight: 97.1 kg (214 lb 1.1 oz) (09/27/17 2327)  Body mass index is 33.53 kg/m².  Body surface area is 2.14 meters squared.    I/O last 3 completed shifts:  In: 1665 [I.V.:115; IV Piggyback:1550]  Out: 3625 [Urine:2975; Stool:650]    Physical Exam   Constitutional: He appears well-developed and well-nourished. He appears lethargic. He appears distressed.    HENT:   Head: Normocephalic and atraumatic.   Eyes: EOM are normal. Scleral icterus is present.   Neck: Normal range of motion. Neck supple.   Cardiovascular: Normal rate and regular rhythm.  Exam reveals no gallop and no friction rub.    No murmur heard.  Pulmonary/Chest: Tachypnea noted. No respiratory distress. He has decreased breath sounds in the right lower field and the left lower field. He has no wheezes. He has no rales.   Abdominal: He exhibits distension and ascites. There is tenderness.   Musculoskeletal: He exhibits edema (+3 lower extremities).   Neurological: He appears lethargic. He is disoriented.   Skin: Skin is warm and dry. No rash noted. He is not diaphoretic. No erythema.       Significant Labs:  CBC:   Recent Labs  Lab 09/29/17  0300   WBC 18.73*   RBC 2.78*   HGB 10.7*   HCT 29.7*      *   MCH 38.5*   MCHC 36.0     CMP:   Recent Labs  Lab 09/29/17  0300   *   CALCIUM 8.1*   ALBUMIN 1.8*   PROT 5.9*      K 2.8*   CO2 17*      BUN 49*   CREATININE 2.2*   ALKPHOS 102   ALT 52*   *   BILITOT 28.1*

## 2017-09-29 NOTE — ASSESSMENT & PLAN NOTE
- Likely due to decompensated alcoholic hepatitis  - Will rule out infectious etiology given rising WCC and organ dysfunction; BCx NGTD   - CT abdomen with small volume ascites, mesenteric edema, and ground glass opacities bilateral lower lungs.    - Broad spectrum abx (vanc and zosyn) to cover for hospital acquired infection (given multiple admission to hospital) and intra-abdominal etiologies, de-escalate abx as indicated   - SBP unlikely considering small volume ascites found on CT- no pocket found on bedside US.     -MELD-Na score: 34 at 9/29/2017  3:00 AM  MELD score: 34 at 9/29/2017  3:00 AM  Calculated from:  Serum Creatinine: 2.2 mg/dL at 9/29/2017  3:00 AM  Serum Sodium: 138 mmol/L (Rounded to 137) at 9/29/2017  3:00 AM  Total Bilirubin: 28.1 mg/dL at 9/29/2017  3:00 AM  INR(ratio): 1.9 at 9/29/2017  3:00 AM  Age: 27 years  - Maddrey's discriminatory function was 43, will hold off on steroids now as work up for active infection underway.   - Hepatitis panel negative.   - OSH workup: negative jae ab, ceruloplasmin, and sm muscle ab; imaging at OSH notable for hepatosplenomegaly (although no CDs were sent for review)  - Hepatology consulted and will follow this recs; considering high rate of recidivism he is likely not candidate for liver transplant.   - Following hepatology recommendations.

## 2017-09-29 NOTE — SUBJECTIVE & OBJECTIVE
Interval History/Significant Events:   - With several loose BM overnight. Patient continues to have distended abdomen. Patient was AAOX2 this morning and more awake, per mother at bedside. Somnolent and does not stay awake during interview but in no distress. Without fevers, chills, and denies abdominal pain this morning.     Review of Systems   Unable to perform ROS: Mental status change     Objective:     Vital Signs (Most Recent):  Temp: 99 °F (37.2 °C) (09/29/17 1100)  Pulse: 107 (09/29/17 1200)  Resp: (!) 28 (09/29/17 1200)  BP: (!) 113/54 (09/29/17 1200)  SpO2: 97 % (09/29/17 1200) Vital Signs (24h Range):  Temp:  [98.3 °F (36.8 °C)-99 °F (37.2 °C)] 99 °F (37.2 °C)  Pulse:  [104-119] 107  Resp:  [22-39] 28  SpO2:  [95 %-98 %] 97 %  BP: (111-142)/(54-69) 113/54   Weight: 97.1 kg (214 lb 1.1 oz)  Body mass index is 33.53 kg/m².      Intake/Output Summary (Last 24 hours) at 09/29/17 1227  Last data filed at 09/29/17 1200   Gross per 24 hour   Intake             1525 ml   Output             2405 ml   Net             -880 ml       Physical Exam   Constitutional: No distress.   obtunded   HENT:   Mouth/Throat: No oropharyngeal exudate.   Eyes: Conjunctivae are normal. Scleral icterus is present.   Neck: Neck supple. No tracheal deviation present.   Cardiovascular: Regular rhythm and normal heart sounds.  Exam reveals no gallop and no friction rub.    No murmur heard.  tachycardic   Pulmonary/Chest: Effort normal. No respiratory distress. He has no wheezes. He has rales (scattered).   Audible grunting, occasional productive cough   Abdominal: Soft. Bowel sounds are normal. He exhibits distension. There is tenderness. There is no guarding.   Musculoskeletal: He exhibits edema (2+ abdominal, sacral and bilateral LE pitting edema. ).   Neurological:   Obtunded, does not answer questions, not oriented   Skin: Skin is warm and dry. He is not diaphoretic.   icteric       Vents:     Lines/Drains/Airways     Drain                  Urethral Catheter 09/27/17 2 days          Peripheral Intravenous Line                 Peripheral IV - Single Lumen Left Hand -- days         Midline Catheter Insertion/Assessment  - Single Lumen 09/27/17 1800 Right 1 day              Significant Labs:    CBC/Anemia Profile:    Recent Labs  Lab 09/27/17  2340 09/28/17  0246 09/28/17 1727 09/29/17  0300   WBC 17.73* 17.33* 17.56* 18.73*   HGB 10.9* 10.9* 10.5* 10.7*   HCT 30.8* 30.1* 29.1* 29.7*    236 241 244   * 107* 106* 107*   RDW 14.6* 14.7* 14.5 14.5   FOLATE 10.6  --   --   --    JTXMOYHX16 >2000*  --   --   --         Chemistries:    Recent Labs  Lab 09/27/17  2340 09/28/17  0246 09/28/17  1727 09/29/17  0300   * 133* 135* 138   K 3.5 3.4* 2.7* 2.8*    103 104 106   CO2 14* 15* 17* 17*   BUN 47* 47* 48* 49*   CREATININE 2.6* 2.4* 2.4* 2.2*   CALCIUM 9.0 8.9 8.4* 8.1*   ALBUMIN 2.3* 2.1* 1.9* 1.8*   PROT 6.6 6.4 6.0 5.9*   BILITOT 30.0* 29.4* 28.0* 28.1*   ALKPHOS 98 100 98 102   ALT 52* 51* 50* 52*   * 105* 106* 105*   MG 1.9 1.9 2.1 2.1   PHOS 5.5* 4.9*  --  3.6       Significant Imaging:  Imaging Results          US Retroperitoneal Complete (Kidney and (Final result)  Result time 09/28/17 16:55:50    Final result by Karolina Hartman MD (09/28/17 16:55:50)                 Impression:        Findings most consistent with medical renal disease noting slight technical limitations detailed above.  ______________________________________     Electronically signed by resident: CHAN LANGFORD MD  Date:     09/28/17  Time:    16:35            As the supervising and teaching physician, I personally reviewed the images and resident's interpretation and I agree with the findings.          Electronically signed by: KAROLINA HARTMAN MD  Date:     09/28/17  Time:    16:55              Narrative:    ULTRASOUND RETROPERITONEAL COMPLETE    INDICATION: Acute kidney injury     COMPARISON: None.    TECHNIQUE: Transabdominal  ultrasonographic images in the longitudinal and transverse axes of the right and left kidneys were obtained. Additionally, spectral doppler images of mid segmental arteries in both kidneys with resistive indices were acquired. Longitudinal and transverse images of the bladder were subsequently obtained.     FINDINGS:     This examination is somewhat limited due to significant artifact overlying left renal fossa with mild obscuration of the left kidney.    The kidneys are normal in size measuring 10.7 cm and the right and 10.3 cm on the left.  Renal morphology appears normal.  No solid mass, nephrolithiasis, or hydronephrosis.  There is decreased perfusion bilaterally and increase renal artery resistive indices measuring 0.85 on the right and 0.78 on the left.    BLADDER:  Collapsed around a Garcia catheter.                             CT Abdomen Pelvis  Without Contrast (Final result)  Result time 09/28/17 15:05:53    Final result by Lenore Liz Jr., MD (09/28/17 15:05:53)                 Impression:         Hepatomegaly.      Small volume ascites, mesenteric edema, portal colopathy, anasarca and mild splenomegaly suggesting sequela of portal hypertension.      Patchy confluent groundglass opacities throughout both lower lungs, probably edema, aspiration, or pneumonia.    Prominent loops of small bowel containing fecal material mixed with scattered gas density suggesting slow transit.   ______________________________________     Electronically signed by resident: ROSA JAMIL MD  Date:     09/28/17  Time:    14:12            As the supervising and teaching physician, I personally reviewed the images and resident's interpretation and I agree with the findings.            Electronically signed by: LENORE LIZ MD  Date:     09/28/17  Time:    15:05              Narrative:    Routine CT of the Abdomen and Pelvis without IV contrast      HISTORY:  27 year old M with alcoholic hepatitis     COMPARISON: Ultrasound  09/28/2017     FINDINGS:  Heart: Normal in size. No pericardial effusion.     Lung Bases: There is patchy groundglass opacities throughout both lungs with some air bronchograms.  There is a trace RIGHT pleural effusion.     Liver: The liver is enlarged with homogenous attenuation..  There are no focal hepatic lesions. The assessment of solid organs is limited in the absence of IV contrast.  Patient's arms within the gantry results in beam hardening artifact.      Gallbladder: No calcified gallstones.     Bile Ducts: No evidence of dilated ducts.     Pancreas: Unremarkable.      Spleen: The spleen is upper limit of normal in size.     Adrenals: Normal.     GI Tract/Mesentery: There prominent loops of small bowel containing fecal material mixed with scattered gas density. The transverse colon is distended with gas density.    There is mild wall thickening and pericolonic fat stranding in the RIGHT colon.  There is a suture material near the ileocecal junction.  No evidence of high-grade bowel obstruction or inflammation. There is a rectal tube in place.    Kidneys/ Ureters: Normal in size and location.No hydronephrosis or nephrolithiasis. No ureteral dilatation.     Bladder: There is a focus of air density within the bladder which contains a Garcia catheter likely related to instrumentation.     Reproductive organs: Unremarkable.     Retroperitoneum:  No significant adenopathy.      Peritoneal Space: There is moderate diffuse mesenteric edema and small volume ascites in the lower abdomen and pelvis. No free air.     Abdominal wall:  There is diffuse body wall edema.     Vasculature: No significant atherosclerosis or aneurysm. There is a small recanalized umbilical vein.    Bones: No acute fracture. Age-appropriate degenerative changes.                             CT Head Without Contrast (Final result)  Result time 09/28/17 13:06:39    Final result by Singh La MD (09/28/17 13:06:39)                 Impression:         No acute intracranial abnormality definitively seen allowing for streak artifact.        Electronically signed by: KENDRA GORDON MD, MD  Date:     09/28/17  Time:    13:06              Narrative:    COMPARISON: None    FINDINGS: Patient is rotated and tilted within the scanner. Beam hardening with streak artifact somewhat limits evaluation. The brain appears normally formed without convincing evidence of hemorrhage, mass, midline shift or acute major vascular territory infarct.  Gray-white interface appears intact.  The ventricular system is normal in size for age and demonstrates no distortion by mass effect.      No extra-axial hemorrhage or mass. The extracranial structures are within normal limits.  No displaced calvarial fracture.  Imaged portions of the paranasal sinuses and mastoid air cells are clear. Imaged portions of the orbits are within normal limits.                             X-Ray Chest 1 View (Final result)  Result time 09/28/17 07:43:17    Final result by Gildardo Davies III, MD (09/28/17 07:43:17)                 Impression:     Pulmonary edema versus pneumonia.      Electronically signed by: GILDARDO DAVIES  Date:     09/28/17  Time:    07:43              Narrative:    One view: There is cardiomegaly, moderate edema.  There is no prior.                             US Abdomen Complete with Dopp_Pre Liver Transplant (Final result)  Result time 09/28/17 04:00:54    Final result by Bong Raymond MD (09/28/17 04:00:54)                 Impression:        1.  Cirrhosis and sequelae of hypertension including mild splenomegaly, recanalized umbilical vein and splenorenal collaterals, and trace ascites.    2.  Otherwise satisfactory Doppler evaluation without evidence of portal vein thrombus.    3.  Heterogeneous parenchyma without evidence of mass lesion.    4.  Hepatomegaly.  ______________________________________     Electronically signed by resident: BRANDEN SORTO MD  Date:      09/28/17  Time:    03:51            As the supervising and teaching physician, I personally reviewed the images and resident's interpretation and I agree with the findings.          Electronically signed by: BERHANE WILKINS MD  Date:     09/28/17  Time:    04:00              Narrative:    Indication: Evaluation for liver transplant.    Technique: Abdominal ultrasound with Doppler was performed.    Comparison: None.    Findings:    The pancreas is obscured by midline bowel gas.    The liver is significantly enlarged measuring 22.4 cm.  The liver demonstrates a subtle nodular contour and heterogeneous parenchyma compatible with cirrhosis.  The liver demonstrates diffuse sonographic attenuation compatible with hepatic steatosis.  No focal abnormalities are identified within the liver.    No intra-or extra hepatic biliary ductal dilatation.  The common bile duct measures 5 mm.    The gallbladder is unremarkable.  Sonographic Chang sign is negative.    The spleen is mildly enlarged measuring 14.0 x 4.8 cm.  No focal abnormalities within the spleen.    The kidneys measure 12.3 and 11.9 cm on the right and left respectively.  No obvious mass or hydronephrosis.  The aorta is obscured by midline bowel gas.    Trace ascites.    Color Doppler images demonstrate appropriate antegrade flow within the hepatic veins, main portal vein, portal venous branches, and IVC.  No portal vein thrombus.  The main hepatic artery is patent without tardus parvus waveform.  Recanalized umbilical vein and mild splenorenal collateral vessels.

## 2017-09-29 NOTE — ASSESSMENT & PLAN NOTE
Non-Oliguric LAURA on Normal Renal Function  -Baseline SCr ~1.0.  Admitted to OSH with SCr of 1.5 and slowly increased through the hospitalization.  Upon arrival at [facility], SCr at 2.6.  -His LAURA is not related to HRS as patient is non-oliguric and normotensive.    -LAURA 2/2 ATN, likely multifactorial.     -Toxic ATN from hyperbilirubinemia and bile salt toxicity vs. Ischemic ATN, as patients  mother reported lower BP's while at OSH when receiving narcotics for pain.    -Urine microscopy with numerous muddy brown casts stained with bili, confirming ATN  -Abdominal imaging reveal mesenteric edema/abdominal wall edema.    -UOP recorded at 2.2L yesterday, net negative 1.1L/24h.  -hypokalemic on labs, 2/2 diarrhea and diuretics.  -His urine Na was low normal yesterday, would expect to be higher given that patient was on diuretics.  Likely 2/2 decreased renal perfusion from renal vascular congestion from hypervolemia.  -recommend continuing Lasix 40 mg IV QD.    -continue function improving.  No need for RRT.

## 2017-09-29 NOTE — PLAN OF CARE
Problem: Patient Care Overview  Goal: Plan of Care Review  Outcome: Ongoing (interventions implemented as appropriate)  No acute events throughout night, VS and assessment per flow sheet, patient progressing towards goals as tolerated, plan of care reviewed with Jhonny Diana and family, all concerns addressed, will continue to monitor.

## 2017-09-29 NOTE — ASSESSMENT & PLAN NOTE
- Ammonia at  - here   - Titrate lactulose for 3-4 BMs daily; good good BM and patient's mentation has moderately improved from yesterday to today.

## 2017-09-29 NOTE — SUBJECTIVE & OBJECTIVE
Interval History: Febrile to 104, bloody BMs overnight. CRRT tolerated. Off cardene. Getting 2 pRBC this morning for drop in H/H. Remains on minimal NC.     Medications:  Continuous Infusions:   Scheduled Meds:   famotidine (PF)  20 mg Intravenous BID    fluconazole (DIFLUCAN) IVPB  400 mg Intravenous Q24H    furosemide  40 mg Intravenous Daily    lactulose  200 g Rectal TID    piperacillin-tazobactam 4.5 g in dextrose 5 % 100 mL IVPB (ready to mix system)  4.5 g Intravenous Q8H    sodium chloride 0.9%  3 mL Intravenous Q8H     PRN Meds:ondansetron, pneumoc 13-arianne conj-dip cr(PF)     Review of patient's allergies indicates:   Allergen Reactions    Bactrim [sulfamethoxazole-trimethoprim] Other (See Comments)     Mookie Trell Syndrome     Objective:     Vital Signs (Most Recent):  Temp: 98.9 °F (37.2 °C) (09/29/17 0701)  Pulse: 107 (09/29/17 1000)  Resp: (!) 22 (09/29/17 1000)  BP: (!) 124/57 (09/29/17 1000)  SpO2: 95 % (09/29/17 1000) Vital Signs (24h Range):  Temp:  [98.3 °F (36.8 °C)-100 °F (37.8 °C)] 98.9 °F (37.2 °C)  Pulse:  [107-119] 107  Resp:  [22-40] 22  SpO2:  [95 %-98 %] 95 %  BP: (111-142)/(54-69) 124/57     Weight: 97.1 kg (214 lb 1.1 oz)  Body mass index is 33.53 kg/m².    Intake/Output - Last 3 Shifts       09/27 0700 - 09/28 0659 09/28 0700 - 09/29 0659 09/29 0700 - 09/30 0659    I.V. (mL/kg) 5 (0.1) 110 (1.1) 15 (0.2)    IV Piggyback 350 1200 400    Total Intake(mL/kg) 355 (3.7) 1310 (13.5) 415 (4.3)    Urine (mL/kg/hr) 770 2205 (0.9) 625 (1.8)    Stool 500 150 (0.1)     Total Output 1270 6408 880    Net -915 -1045 -210                 Physical Exam   General: WD. Malnourished. In acute distress. Shaking  Tachycardiac  Unlabored breathing. On 2L NC  Abdomen: Distention, improved from yesterday. Minimal diffuse tenderness. Staples are clean/dry/intact  No c/c/e     Significant Labs:  CBC:   Recent Labs  Lab 09/29/17  0300   WBC 18.73*   RBC 2.78*   HGB 10.7*   HCT 29.7*      *    MCH 38.5*   MCHC 36.0     CMP:   Recent Labs  Lab 09/29/17  0300   *   CALCIUM 8.1*   ALBUMIN 1.8*   PROT 5.9*      K 2.8*   CO2 17*      BUN 49*   CREATININE 2.2*   ALKPHOS 102   ALT 52*   *   BILITOT 28.1*     Coagulation:   Recent Labs  Lab 09/28/17  1727 09/29/17  0300   LABPROT 17.8* 18.6*   INR 1.8* 1.9*   APTT 32.1*  --        Significant Diagnostics:  Microbiology Results (last 7 days)     Procedure Component Value Units Date/Time    Blood culture [[id number]] Collected:  09/28/17 0030    Order Status:  Completed Specimen:  Blood from Peripheral, Antecubital, Left Updated:  09/29/17 0622     Blood Culture, Routine No Growth to date     Blood Culture, Routine No Growth to date    Blood culture [[id number]] Collected:  09/28/17 0044    Order Status:  Completed Specimen:  Blood from Peripheral, Hand, Right Updated:  09/29/17 0622     Blood Culture, Routine No Growth to date     Blood Culture, Routine No Growth to date    Urine culture [[id number]] Collected:  09/28/17 0021    Order Status:  Sent Specimen:  Urine from Urine, Catheterized Updated:  09/28/17 0452

## 2017-09-29 NOTE — ASSESSMENT & PLAN NOTE
2/2 Diuretic administration and medication induced diarrhea.  -Patient is around 200-400 meq deficient in potassium.  Unfortunately, unable to tolerate PO medications for replacement, and does not have central access for high IV dosing.  -Agree with current IV K replacement, recommend rechecking potassium level after dose and continue replacing as needed.

## 2017-09-29 NOTE — PROGRESS NOTES
09/29/2017 10:47 AM  Jhonny Diana  [id number]  1990    Psychiatry Plan of Care:    Pt 27yoM with hx of alcohol use who presented with acute on chronic liver failure. Addiction psychiatry was consulted for transplant evaluation given hx of alcohol abuse. Pt remains too ill for transplant at this time, and too encephalopathic to provide history. Psychiatry will continue to follow from a distance and provide recommendations as needed, pending improvement in pt's mental status.    Mert Omer MD  [facility] PGY-II  09/29/2017 10:49 AM    Patient seen and examined with resident agree with assessment and plan as documented above.     Edmar Win MD  [facility]

## 2017-09-29 NOTE — ASSESSMENT & PLAN NOTE
Patient critically ill with obtundation and evidence of liver failure.    Discontinue diuretics  Rule out HRS with Albumin 25g TID  Obtain urine studies  Check PETH    Will seek insurance authorization for transplant evaluation: patient is currently too ill for transplant, however will seek to risk stratify in case the patient improves clinically    Please obtain outside records from [facility] and the [facility].

## 2017-09-29 NOTE — PT/OT/SLP PROGRESS
Speech Language Pathology      Jhonny Diana  MRN: [id number]    Patient not seen today secondary to MD hold (Comment) (Per discussion with medical team Pt not medically stable for any PO intake ). Orders at this time will be discontinued and team to place new eval and treat orders once medically appropriate. Medical team in agreement with plan.     Carol Dutton, GERARD-SLP

## 2017-09-29 NOTE — ASSESSMENT & PLAN NOTE
- Multifactorial (renal/liver dysfunction); albumin 1.8  - Will diurese and closely monitor labs/urine output   - Lasix IV 40 mg daily; uptitrate as indicated

## 2017-09-29 NOTE — ASSESSMENT & PLAN NOTE
- Suspect 2/2 to acute renal failure; gap mildly elevated at 15 with lactic acid wnl.   - BHB not elevated.  - Continue daily BMP.

## 2017-09-29 NOTE — PROGRESS NOTES
"[facility]  Hepatology  Progress Note    Patient Name: Jhonny Diana  MRN: [id number]  Admission Date: 9/27/2017  Hospital Length of Stay: 2 days  Attending Provider: Sajan Sandra MD   Primary Care Physician: Provider Notinsystem  Principal Problem:Alcoholic hepatitis without ascites    Subjective:     Transplant status: No    HPI: 28 yo male with hx of childhood asthma and heavy alcohol abuse (fifth of liquor daily since teenager, last drink 2.5 wks ago per mother) who [place] [facility] [place] level [place]/liver transplant evaluation.    History obtained per chart review and discussion with mother: Patient initially presented to OSH for a 2 mo hx of progressively worsening abdominal pain with associated fatigue, jaundice, and abdominal distention with ~20# weight gain.     The patient's mother states he began having abdominal pain about 6 months ago.  States he underwent many tests, all of which were inconclusive.  He then began to have abdominal swelling about two months ago and was in&out of the local ER.  He was still actively drinking up until about 2.5 weeks ago according to the mother but she is unsure of this.  At the OSH the patient underwent an EGD which showed g1EV.  He was also diagnosed with "pancreatitis" based on an elevated lipase.  The patient was also placed on prednisone given concerns for alcoholic hepatitis.           Interval History: Patient remains somnolent, not alert nor oriented.     Current Facility-Administered Medications   Medication    famotidine (PF) injection 20 mg    fluconazole (DIFLUCAN) IVPB 400 mg 200 mL    furosemide injection 40 mg    lactulose 10 gram/15 mL solution (enema) 200 g    ondansetron injection 4 mg    piperacillin-tazobactam 4.5 g in dextrose 5 % 100 mL IVPB (ready to mix system)    pneumoc 13-arianne conj-dip cr(PF) 0.5 mL    sodium chloride 0.9% flush 3 mL       Objective:     Vital Signs (Most Recent):  Temp: 98.9 °F (37.2 " °C) (09/29/17 0701)  Pulse: (!) 114 (09/29/17 0900)  Resp: (!) 28 (09/29/17 0900)  BP: (!) 129/58 (09/29/17 0900)  SpO2: 97 % (09/29/17 0900) Vital Signs (24h Range):  Temp:  [98.3 °F (36.8 °C)-100 °F (37.8 °C)] 98.9 °F (37.2 °C)  Pulse:  [107-119] 114  Resp:  [22-40] 28  SpO2:  [95 %-98 %] 97 %  BP: (111-142)/(54-69) 129/58     Weight: 97.1 kg (214 lb 1.1 oz) (09/27/17 2327)  Body mass index is 33.53 kg/m².    Physical Exam   Constitutional: No distress.   obtunded   HENT:   Mouth/Throat: No oropharyngeal exudate.   Eyes: Conjunctivae are normal. Scleral icterus is present.   Neck: Neck supple. No tracheal deviation present.   Cardiovascular: Regular rhythm and normal heart sounds.  Exam reveals no gallop and no friction rub.    No murmur heard.  tachycardic   Pulmonary/Chest: Effort normal. No respiratory distress. He has no wheezes. He has rales (scattered).   Audible grunting, occasional productive cough   Abdominal: Soft. Bowel sounds are normal. He exhibits distension. There is tenderness. There is no guarding.   Neurological:   Obtunded, does not answer questions, not oriented   Skin: Skin is warm and dry. He is not diaphoretic.   icteric       MELD-Na score: 34 at 9/29/2017  3:00 AM  MELD score: 34 at 9/29/2017  3:00 AM  Calculated from:  Serum Creatinine: 2.2 mg/dL at 9/29/2017  3:00 AM  Serum Sodium: 138 mmol/L (Rounded to 137) at 9/29/2017  3:00 AM  Total Bilirubin: 28.1 mg/dL at 9/29/2017  3:00 AM  INR(ratio): 1.9 at 9/29/2017  3:00 AM  Age: 27 years    Significant Labs:  CBC:   Recent Labs  Lab 09/29/17  0300   WBC 18.73*   RBC 2.78*   HGB 10.7*   HCT 29.7*        CMP:   Recent Labs  Lab 09/29/17  0300   *   CALCIUM 8.1*   ALBUMIN 1.8*   PROT 5.9*      K 2.8*   CO2 17*      BUN 49*   CREATININE 2.2*   ALKPHOS 102   ALT 52*   *   BILITOT 28.1*     Coagulation:   Recent Labs  Lab 09/28/17  1727 09/29/17  0300   INR 1.8* 1.9*   APTT 32.1*  --        Significant  Imaging:  Labs: Reviewed  .    Assessment/Plan:     Decompensated hepatic cirrhosis    Patient critically ill with obtundation and evidence of liver failure.    Discontinue diuretics  Rule out HRS with Albumin 25g TID  Obtain urine studies  Check PETH    Will seek insurance authorization for transplant evaluation: patient is currently too ill for transplant, however will seek to risk stratify in case the patient improves clinically    Please obtain outside records from [facility] and the [facility].               Thank you for your consult. I will follow-up with patient. Please contact us if you have any additional questions.    Bairon Tam   Gastroenterology Fellow PGY VI  [phone]

## 2017-09-29 NOTE — ASSESSMENT & PLAN NOTE
- Pt had an episode of hematemesis at OSH   - Underwent EGD that revealed 1 small varix, reflux esophagitis, and gastropathy of portal hypertension  - H/H stable at 10   - Continue to monitor closely for blood loss  - GI prophylaxis with famotidine 20 mg IV BID.

## 2017-09-29 NOTE — PLAN OF CARE
Problem: Patient Care Overview  Goal: Plan of Care Review  Outcome: Ongoing (interventions implemented as appropriate)   09/29/17 2761   Coping/Psychosocial   Plan Of Care Reviewed With patient;family     Patient with no significant events throughout shift. Vs as documented. Tolerated treatment throughout shift with only complaints r/t lactulose enema tolerance. No apparent distress at this time. Patient's mother at bedside, kept updated throughout shift. Patient assessment as documented. Will give report to oncoming nurse.

## 2017-09-29 NOTE — PROGRESS NOTES
General Surgery Brief Note    Seen and examined at bedside    On exam lethargic, but arousable    Exam diffusely mild tenderness, no rebound or guarding    Made >2 L of urine and having some bowel function    No acute surgical interventions   No evidence of compartment syndrome.   Will sign off. Call with questions / significant changes in abdominal exam    Bean Zaldivar MD   [facility]

## 2017-09-29 NOTE — ASSESSMENT & PLAN NOTE
- Fifth of whiskey daily since patient was a teenager  - Last drink per mother 2.5 wks ago  - Denies hx of WD  - Monitor for alcohol WD sx  - Addiction psychiatry for alcohol abuse  - Follow up PET

## 2017-09-29 NOTE — PLAN OF CARE
Problem: Patient Care Overview  Goal: Plan of Care Review  Outcome: Ongoing (interventions implemented as appropriate)   09/28/17 1940   Coping/Psychosocial   Plan Of Care Reviewed With patient;family     No acute events throughout day. See vital signs and assessments in flowsheets. See below for updates on today's progress.     Pulmonary: patient remains on room air. Continuous respiratory rate elevated, physicians aware.    Cardiovascular: hr remains regular, elevated. No change during shift. bp wdl.    Neurological: neuro status as documented, unchanged. Remains arousable, able to answer simple questions appropriately during shift.     Gastrointestinal: abdomen remains distended, firm, tender. Unchanged during shift.     Genitourinary: urinary catheter in place, urine output as documented. Bladder pressure as documented, nephrology at bedside at time pressure taken.     Integumentary/Other: yellow tint to skin remains, unchanged. Bruising noted, scattered, no other abnormalities.    Infusions: antibiotics ivpb as ordered.    Patient progressing towards goals as tolerated, plan of care communicated and reviewed with Jhonny Diana and family. All concerns addressed. VS as documented. Assessments as documented. Patient's family at bedside throughout shift, kept updated during shift. All orders completed as able. Will continue to monitor. Report given to oncoming nurse, SISSY Randolph.

## 2017-09-29 NOTE — ASSESSMENT & PLAN NOTE
- Worsening renal function    -Cr at OSH 1.5, Cr on admit 2.6 with elevated BUN  - Renal US demonstrable for medical renal disease.   - Currently with good UOP with furosemide 40 mg IV daily.  - Reportedly with low BP at OSH; DD includes renal vein congestion from compartment syndrome (bladder pressure 33) + ATN  - Creatinine improving to 2.2 from 2.6 yesterday.  - Continue daily BMP and strict I/O.

## 2017-09-29 NOTE — TELEPHONE ENCOUNTER
Jhonny Diana discussed during Friday mini meeting.  Patient referred for liver transplant evaluation. Insurance has approved evaluation. Committee concerns as follows. Addiction psych eval is unable to perform risk assessment due to patient being unable to participate.  Patient medical status is not currently stable.  Committee decision is to place evaluation on hold until patient medically stable and patient able to participate in addiction psych eval for adequate transplant risk assessment.  Coordinator will attempt to locate past medical records of patient in the interim. Patient will be discussed at Conference on Tuesday October 3rd, 2017

## 2017-09-29 NOTE — ASSESSMENT & PLAN NOTE
- As seen on CT abdomen.  - With leukocytosis stable at WBC 17-18 here.  - BP stable and patient has remained afebrile, moderately tachycardic in HR 110s.  - Continue Zosyn/Vanc

## 2017-09-29 NOTE — SUBJECTIVE & OBJECTIVE
Interval History: Patient remains somnolent, not alert nor oriented.     Current Facility-Administered Medications   Medication    famotidine (PF) injection 20 mg    fluconazole (DIFLUCAN) IVPB 400 mg 200 mL    furosemide injection 40 mg    lactulose 10 gram/15 mL solution (enema) 200 g    ondansetron injection 4 mg    piperacillin-tazobactam 4.5 g in dextrose 5 % 100 mL IVPB (ready to mix system)    pneumoc 13-arianne conj-dip cr(PF) 0.5 mL    sodium chloride 0.9% flush 3 mL       Objective:     Vital Signs (Most Recent):  Temp: 98.9 °F (37.2 °C) (09/29/17 0701)  Pulse: (!) 114 (09/29/17 0900)  Resp: (!) 28 (09/29/17 0900)  BP: (!) 129/58 (09/29/17 0900)  SpO2: 97 % (09/29/17 0900) Vital Signs (24h Range):  Temp:  [98.3 °F (36.8 °C)-100 °F (37.8 °C)] 98.9 °F (37.2 °C)  Pulse:  [107-119] 114  Resp:  [22-40] 28  SpO2:  [95 %-98 %] 97 %  BP: (111-142)/(54-69) 129/58     Weight: 97.1 kg (214 lb 1.1 oz) (09/27/17 2327)  Body mass index is 33.53 kg/m².    Physical Exam   Constitutional: No distress.   obtunded   HENT:   Mouth/Throat: No oropharyngeal exudate.   Eyes: Conjunctivae are normal. Scleral icterus is present.   Neck: Neck supple. No tracheal deviation present.   Cardiovascular: Regular rhythm and normal heart sounds.  Exam reveals no gallop and no friction rub.    No murmur heard.  tachycardic   Pulmonary/Chest: Effort normal. No respiratory distress. He has no wheezes. He has rales (scattered).   Audible grunting, occasional productive cough   Abdominal: Soft. Bowel sounds are normal. He exhibits distension. There is tenderness. There is no guarding.   Neurological:   Obtunded, does not answer questions, not oriented   Skin: Skin is warm and dry. He is not diaphoretic.   icteric       MELD-Na score: 34 at 9/29/2017  3:00 AM  MELD score: 34 at 9/29/2017  3:00 AM  Calculated from:  Serum Creatinine: 2.2 mg/dL at 9/29/2017  3:00 AM  Serum Sodium: 138 mmol/L (Rounded to 137) at 9/29/2017  3:00 AM  Total  Bilirubin: 28.1 mg/dL at 9/29/2017  3:00 AM  INR(ratio): 1.9 at 9/29/2017  3:00 AM  Age: 27 years    Significant Labs:  CBC:   Recent Labs  Lab 09/29/17  0300   WBC 18.73*   RBC 2.78*   HGB 10.7*   HCT 29.7*        CMP:   Recent Labs  Lab 09/29/17  0300   *   CALCIUM 8.1*   ALBUMIN 1.8*   PROT 5.9*      K 2.8*   CO2 17*      BUN 49*   CREATININE 2.2*   ALKPHOS 102   ALT 52*   *   BILITOT 28.1*     Coagulation:   Recent Labs  Lab 09/28/17  1727 09/29/17  0300   INR 1.8* 1.9*   APTT 32.1*  --        Significant Imaging:  Labs: Reviewed  .

## 2017-09-29 NOTE — PROGRESS NOTES
[facility]  Nephrology  Progress Note    Patient Name: Jhonny Diana  MRN: [id number]  Admission Date: 9/27/2017  Hospital Length of Stay: 2 days  Attending Provider: Sajan Sandra MD   Primary Care Physician: Provider Notinsystem  Principal Problem:Alcoholic hepatitis without ascites    Subjective:     HPI: Jhonny Diana is a 26 yo male with pMHX of childhood asthma and alcohol abuse, was transferred to [facility] for hepatology evaluation for worsening liver functions.  Information was obtained from family at bedside and review of outside records, given that patient is obtunded.  According to mother at bedside, patient has had multiple admissions to the hospital over the past 6 months for recurrent abdominal pain.  She reports that he has been been seen the ED and has been diagnosed with diverticulitis, UTIs, gastroenteritis, and cholecystitis.  He was never admitted to the hospital and was always discharged.  On 09/06, patient presented to OSH for worsening abdominal pain and distension.  He was diagnosed at that time with ETOH hepatitis.  He was treated with steroids, ceftriaxone for SBP prophylaxis, and diuresed at that time for volume management.  He was discharged home on the 15th, after clearance from GI,a nd was instructed to follow-up with his PCP 1 week after discharge.  He presented to the hospital on 09/25 for worsening abdominal pain and distension with 20 kg weight gain.  He was transferred to [facility] for further care and Nephrology was consulted for LAURA.    On review of outside records, patient Scr on admission was 1.5 (with baseline at 1.0).  According to the family at the bedside, he was not urinating well at the OSH, and not responding to diuretics (spirionolactone/lasix combo).  His mother reports that patient continued to c/o abdominal and inability to urinate, but no chicas was placed until transfer to [facility] yesterday.  Record reviews shows relative hypotension with BPs in the low 90's.   His mother reports that the nursing staff was administrating his narcotic regimen based on his blood pressures, and at times, medication was held due to hypotension.  Since admission to hospital, his UOP has improved, responding well in Lasix 40 IV (770 ml since admission, 700 ml so far this shift at time of consultation).  Admit labs revealed a SCr of 2.6, this morning improved down to 2.4.  Bilirubin remains elevated, >14.  Abdomen remains with tight distension, unable to perform paracentesis, as no significant fluid pocket found.  Mother reports patient did not have para's done at OSH.    Labs on the morning of 09/27 with bicarb of 15.  ABG with alkalemia and primary respiratory alkalosis and anion gap acidosis.     Interval History:   NAEON, maintains good UOP with lasix QD dosing, net negative 1.1L/24h.  Kidney function slightly improved with a decrease in SCr to 2.2 from 2.4.  Hypokalemic on AM labs.    General surgery evaluated patient, not concerned at this time for abdominal compartment syndrome given adequate UOP.  Urine Na resulted at low normal, at 24, while would expect to be higher given that he was on diuretics.  Abdominal CT revealed no major findings except mesenteric and abdominal wall edema.      Review of patient's allergies indicates:   Allergen Reactions    Bactrim [sulfamethoxazole-trimethoprim] Other (See Comments)     Mookie Trell Syndrome     Current Facility-Administered Medications   Medication Frequency    famotidine (PF) injection 20 mg BID    fluconazole (DIFLUCAN) IVPB 400 mg 200 mL Q24H    furosemide injection 40 mg Daily    lactulose 10 gram/15 mL solution (enema) 200 g TID    ondansetron injection 4 mg Q6H PRN    piperacillin-tazobactam 4.5 g in dextrose 5 % 100 mL IVPB (ready to mix system) Q8H    pneumoc 13-arianne conj-dip cr(PF) 0.5 mL vaccine x 1 dose    sodium chloride 0.9% flush 3 mL Q8H       Objective:     Vital Signs (Most Recent):  Temp: 98.9 °F (37.2 °C)  (09/29/17 0701)  Pulse: (!) 113 (09/29/17 0800)  Resp: (!) 29 (09/29/17 0800)  BP: 139/65 (09/29/17 0800)  SpO2: 96 % (09/29/17 0800)  O2 Device (Oxygen Therapy): room air (09/29/17 0701) Vital Signs (24h Range):  Temp:  [98.3 °F (36.8 °C)-100 °F (37.8 °C)] 98.9 °F (37.2 °C)  Pulse:  [107-119] 113  Resp:  [22-40] 29  SpO2:  [94 %-98 %] 96 %  BP: (111-142)/(54-69) 139/65     Weight: 97.1 kg (214 lb 1.1 oz) (09/27/17 2327)  Body mass index is 33.53 kg/m².  Body surface area is 2.14 meters squared.    I/O last 3 completed shifts:  In: 1665 [I.V.:115; IV Piggyback:1550]  Out: 3625 [Urine:2975; Stool:650]    Physical Exam   Constitutional: He appears well-developed and well-nourished. He appears lethargic. He appears distressed.   HENT:   Head: Normocephalic and atraumatic.   Eyes: EOM are normal. Scleral icterus is present.   Neck: Normal range of motion. Neck supple.   Cardiovascular: Normal rate and regular rhythm.  Exam reveals no gallop and no friction rub.    No murmur heard.  Pulmonary/Chest: Tachypnea noted. No respiratory distress. He has decreased breath sounds in the right lower field and the left lower field. He has no wheezes. He has no rales.   Abdominal: He exhibits distension and ascites. There is tenderness.   Musculoskeletal: He exhibits edema (+3 lower extremities).   Neurological: He appears lethargic. He is disoriented.   Skin: Skin is warm and dry. No rash noted. He is not diaphoretic. No erythema.       Significant Labs:  CBC:   Recent Labs  Lab 09/29/17  0300   WBC 18.73*   RBC 2.78*   HGB 10.7*   HCT 29.7*      *   MCH 38.5*   MCHC 36.0     CMP:   Recent Labs  Lab 09/29/17  0300   *   CALCIUM 8.1*   ALBUMIN 1.8*   PROT 5.9*      K 2.8*   CO2 17*      BUN 49*   CREATININE 2.2*   ALKPHOS 102   ALT 52*   *   BILITOT 28.1*          Assessment/Plan:     Acute renal failure    Non-Oliguric LAURA on Normal Renal Function  -Baseline SCr ~1.0.  Admitted to OSH with  SCr of 1.5 and slowly increased through the hospitalization.  Upon arrival at [facility], SCr at 2.6.  -His LAURA is not related to HRS as patient is non-oliguric and normotensive.    -LAURA 2/2 ATN, likely multifactorial.     -Toxic ATN from hyperbilirubinemia and bile salt toxicity vs. Ischemic ATN, as patients  mother reported lower BP's while at OSH when receiving narcotics for pain.    -Urine microscopy with numerous muddy brown casts stained with bili, confirming ATN  -Abdominal imaging reveal mesenteric edema/abdominal wall edema.    -UOP recorded at 2.2L yesterday, net negative 1.1L/24h.  -hypokalemic on labs, 2/2 diarrhea and diuretics.  -His urine Na was low normal yesterday, would expect to be higher given that patient was on diuretics.  Likely 2/2 decreased renal perfusion from renal vascular congestion from hypervolemia.  -recommend continuing Lasix 40 mg IV QD.    -continue function improving.  No need for RRT.        Increased anion gap metabolic acidosis    Anion Gap Metabolic Acidosis  -Corrected AG of 20 given hypoalbuminemia  -bicarb this morning on renal panel improved to 17.  -recommend repeat of ABG  -he has a primary respiratory alkalosis with underlying metabolic acidosis.    -No treatment from renal standpoint is needed at this time.  Will need close monitoring for a decline in his respiratory status.        Hypokalemia    2/2 Diuretic administration and medication induced diarrhea.  -Patient is around 200-400 meq deficient in potassium.  Unfortunately, unable to tolerate PO medications for replacement, and does not have central access for high IV dosing.  -Agree with current IV K replacement, recommend rechecking potassium level after dose and continue replacing as needed.                  Derrick Love, SAMREEN  Nephrology  [facility]

## 2017-09-29 NOTE — ASSESSMENT & PLAN NOTE
Anion Gap Metabolic Acidosis  -Corrected AG of 20 given hypoalbuminemia  -bicarb this morning on renal panel improved to 17.  -recommend repeat of ABG  -he has a primary respiratory alkalosis with underlying metabolic acidosis.    -No treatment from renal standpoint is needed at this time.  Will need close monitoring for a decline in his respiratory status.

## 2017-09-30 LAB
ALBUMIN SERPL BCP-MCNC: 2.1 G/DL
ALP SERPL-CCNC: 99 U/L
ALT SERPL W/O P-5'-P-CCNC: 57 U/L
ANION GAP SERPL CALC-SCNC: 14 MMOL/L
ANION GAP SERPL CALC-SCNC: 14 MMOL/L
AST SERPL-CCNC: 107 U/L
BASOPHILS # BLD AUTO: 0.01 K/UL
BASOPHILS NFR BLD: 0.1 %
BILIRUB SERPL-MCNC: 28.3 MG/DL
BUN SERPL-MCNC: 54 MG/DL
BUN SERPL-MCNC: 56 MG/DL
CALCIUM SERPL-MCNC: 8 MG/DL
CALCIUM SERPL-MCNC: 8.4 MG/DL
CHLORIDE SERPL-SCNC: 109 MMOL/L
CHLORIDE SERPL-SCNC: 111 MMOL/L
CO2 SERPL-SCNC: 14 MMOL/L
CO2 SERPL-SCNC: 16 MMOL/L
CREAT SERPL-MCNC: 1.5 MG/DL
CREAT SERPL-MCNC: 2.2 MG/DL
DIFFERENTIAL METHOD: ABNORMAL
EOSINOPHIL # BLD AUTO: 0 K/UL
EOSINOPHIL NFR BLD: 0.1 %
ERYTHROCYTE [DISTWIDTH] IN BLOOD BY AUTOMATED COUNT: 14.9 %
EST. GFR  (AFRICAN AMERICAN): 45.4 ML/MIN/1.73 M^2
EST. GFR  (AFRICAN AMERICAN): >60 ML/MIN/1.73 M^2
EST. GFR  (NON AFRICAN AMERICAN): 39.3 ML/MIN/1.73 M^2
EST. GFR  (NON AFRICAN AMERICAN): >60 ML/MIN/1.73 M^2
GLUCOSE SERPL-MCNC: 113 MG/DL
GLUCOSE SERPL-MCNC: 97 MG/DL
HCT VFR BLD AUTO: 28.8 %
HGB BLD-MCNC: 10.5 G/DL
INR PPP: 1.8
LYMPHOCYTES # BLD AUTO: 0.8 K/UL
LYMPHOCYTES NFR BLD: 4 %
MAGNESIUM SERPL-MCNC: 1.9 MG/DL
MCH RBC QN AUTO: 38.7 PG
MCHC RBC AUTO-ENTMCNC: 36.5 G/DL
MCV RBC AUTO: 106 FL
MONOCYTES # BLD AUTO: 1.5 K/UL
MONOCYTES NFR BLD: 8.1 %
NEUTROPHILS # BLD AUTO: 16.3 K/UL
NEUTROPHILS NFR BLD: 86.7 %
PHOSPHATE SERPL-MCNC: 2.7 MG/DL
PLATELET # BLD AUTO: 222 K/UL
PMV BLD AUTO: 10.2 FL
POTASSIUM SERPL-SCNC: 3.3 MMOL/L
POTASSIUM SERPL-SCNC: 4.7 MMOL/L
PROT SERPL-MCNC: 6 G/DL
PROTHROMBIN TIME: 18.5 SEC
RBC # BLD AUTO: 2.71 M/UL
SODIUM SERPL-SCNC: 139 MMOL/L
SODIUM SERPL-SCNC: 139 MMOL/L
VANCOMYCIN SERPL-MCNC: 20.8 UG/ML
WBC # BLD AUTO: 18.74 K/UL

## 2017-09-30 PROCEDURE — 63600175 PHARM REV CODE 636 W HCPCS: Performed by: INTERNAL MEDICINE

## 2017-09-30 PROCEDURE — 63600175 PHARM REV CODE 636 W HCPCS: Performed by: STUDENT IN AN ORGANIZED HEALTH CARE EDUCATION/TRAINING PROGRAM

## 2017-09-30 PROCEDURE — 43752 NASAL/OROGASTRIC W/TUBE PLMT: CPT

## 2017-09-30 PROCEDURE — 80202 ASSAY OF VANCOMYCIN: CPT

## 2017-09-30 PROCEDURE — 99233 SBSQ HOSP IP/OBS HIGH 50: CPT | Mod: ,,, | Performed by: INTERNAL MEDICINE

## 2017-09-30 PROCEDURE — 84100 ASSAY OF PHOSPHORUS: CPT

## 2017-09-30 PROCEDURE — 25000003 PHARM REV CODE 250: Performed by: STUDENT IN AN ORGANIZED HEALTH CARE EDUCATION/TRAINING PROGRAM

## 2017-09-30 PROCEDURE — 80048 BASIC METABOLIC PNL TOTAL CA: CPT

## 2017-09-30 PROCEDURE — 20000000 HC ICU ROOM

## 2017-09-30 PROCEDURE — S0028 INJECTION, FAMOTIDINE, 20 MG: HCPCS | Performed by: STUDENT IN AN ORGANIZED HEALTH CARE EDUCATION/TRAINING PROGRAM

## 2017-09-30 PROCEDURE — 85025 COMPLETE CBC W/AUTO DIFF WBC: CPT

## 2017-09-30 PROCEDURE — 94761 N-INVAS EAR/PLS OXIMETRY MLT: CPT

## 2017-09-30 PROCEDURE — 25000003 PHARM REV CODE 250: Performed by: HOSPITALIST

## 2017-09-30 PROCEDURE — 85610 PROTHROMBIN TIME: CPT

## 2017-09-30 PROCEDURE — A4216 STERILE WATER/SALINE, 10 ML: HCPCS | Performed by: STUDENT IN AN ORGANIZED HEALTH CARE EDUCATION/TRAINING PROGRAM

## 2017-09-30 PROCEDURE — P9047 ALBUMIN (HUMAN), 25%, 50ML: HCPCS | Performed by: INTERNAL MEDICINE

## 2017-09-30 PROCEDURE — 99291 CRITICAL CARE FIRST HOUR: CPT | Mod: ,,, | Performed by: INTERNAL MEDICINE

## 2017-09-30 PROCEDURE — 80053 COMPREHEN METABOLIC PANEL: CPT

## 2017-09-30 PROCEDURE — 83735 ASSAY OF MAGNESIUM: CPT

## 2017-09-30 PROCEDURE — 25000003 PHARM REV CODE 250: Performed by: INTERNAL MEDICINE

## 2017-09-30 RX ORDER — POTASSIUM CHLORIDE 7.45 MG/ML
10 INJECTION INTRAVENOUS
Status: DISPENSED | OUTPATIENT
Start: 2017-09-30 | End: 2017-09-30

## 2017-09-30 RX ORDER — LACTULOSE 10 G/15ML
20 SOLUTION ORAL 3 TIMES DAILY
Status: DISCONTINUED | OUTPATIENT
Start: 2017-09-30 | End: 2017-10-02

## 2017-09-30 RX ORDER — HEPARIN SODIUM 5000 [USP'U]/ML
5000 INJECTION, SOLUTION INTRAVENOUS; SUBCUTANEOUS EVERY 12 HOURS
Status: DISCONTINUED | OUTPATIENT
Start: 2017-09-30 | End: 2017-10-02

## 2017-09-30 RX ADMIN — FAMOTIDINE 20 MG: 10 INJECTION, SOLUTION INTRAVENOUS at 09:09

## 2017-09-30 RX ADMIN — FLUCONAZOLE 400 MG: 2 INJECTION INTRAVENOUS at 11:09

## 2017-09-30 RX ADMIN — ALBUMIN (HUMAN) 25 G: 12.5 SOLUTION INTRAVENOUS at 01:09

## 2017-09-30 RX ADMIN — LACTULOSE 200 G: 10 SOLUTION ORAL at 05:09

## 2017-09-30 RX ADMIN — PIPERACILLIN AND TAZOBACTAM 4.5 G: 4; .5 INJECTION, POWDER, FOR SOLUTION INTRAVENOUS at 05:09

## 2017-09-30 RX ADMIN — RIFAXIMIN 550 MG: 550 TABLET ORAL at 09:09

## 2017-09-30 RX ADMIN — Medication 3 ML: at 05:09

## 2017-09-30 RX ADMIN — FUROSEMIDE 40 MG: 10 INJECTION, SOLUTION INTRAVENOUS at 08:09

## 2017-09-30 RX ADMIN — ALBUMIN (HUMAN) 25 G: 12.5 SOLUTION INTRAVENOUS at 09:09

## 2017-09-30 RX ADMIN — HEPARIN SODIUM 5000 UNITS: 5000 INJECTION, SOLUTION INTRAVENOUS; SUBCUTANEOUS at 09:09

## 2017-09-30 RX ADMIN — PIPERACILLIN AND TAZOBACTAM 4.5 G: 4; .5 INJECTION, POWDER, FOR SOLUTION INTRAVENOUS at 01:09

## 2017-09-30 RX ADMIN — POTASSIUM CHLORIDE 10 MEQ: 10 INJECTION, SOLUTION INTRAVENOUS at 08:09

## 2017-09-30 RX ADMIN — PIPERACILLIN AND TAZOBACTAM 4.5 G: 4; .5 INJECTION, POWDER, FOR SOLUTION INTRAVENOUS at 08:09

## 2017-09-30 RX ADMIN — FAMOTIDINE 20 MG: 10 INJECTION, SOLUTION INTRAVENOUS at 08:09

## 2017-09-30 RX ADMIN — LACTULOSE 20 G: 20 SOLUTION ORAL at 09:09

## 2017-09-30 RX ADMIN — POTASSIUM CHLORIDE 10 MEQ: 10 INJECTION, SOLUTION INTRAVENOUS at 07:09

## 2017-09-30 RX ADMIN — LACTULOSE 20 G: 20 SOLUTION ORAL at 02:09

## 2017-09-30 RX ADMIN — HEPARIN SODIUM 5000 UNITS: 5000 INJECTION, SOLUTION INTRAVENOUS; SUBCUTANEOUS at 10:09

## 2017-09-30 RX ADMIN — ALBUMIN (HUMAN) 25 G: 12.5 SOLUTION INTRAVENOUS at 05:09

## 2017-09-30 RX ADMIN — Medication 3 ML: at 01:09

## 2017-09-30 NOTE — ASSESSMENT & PLAN NOTE
Non-Oliguric LAURA on Normal Renal Function  -Baseline SCr ~1.0.  Admitted to OSH with SCr of 1.5 and slowly increased through the hospitalization.  Upon arrival at [facility], SCr at 2.6.  -His LAURA is not related to HRS as patient is non-oliguric and normotensive.    -LAURA 2/2 ATN, likely multifactorial.     -Toxic ATN from hyperbilirubinemia and bile salt toxicity vs. Ischemic ATN, as patients  mother reported lower BP's while at OSH when receiving narcotics for pain.    -Urine microscopy with numerous muddy brown casts stained with bili, confirming ATN (and by definition ruling out HRS as a primary cause)   -UOP by > 2,600ccs, net negative by > 1000ccs past 24hrs  -hypokalemic on labs, 2/2 diarrhea and diuretics.  -His urine Na was low normal yesterday, would expect to be higher given that patient was on diuretics.  Likely 2/2 decreased renal perfusion from renal vascular congestion from hypervolemia.  -recommend continuing Lasix 40 mg IV QD.    -continue function improving, sCr 1.5 this morning.  No need for RRT.

## 2017-09-30 NOTE — SUBJECTIVE & OBJECTIVE
Interval History: Mental status improving - more alert however still not oriented.     Current Facility-Administered Medications   Medication    albumin human 25% bottle 25 g    famotidine (PF) injection 20 mg    fluconazole (DIFLUCAN) IVPB 400 mg 200 mL    furosemide injection 40 mg    heparin (porcine) injection 5,000 Units    lactulose 10 gram/15 mL solution (enema) 200 g    ondansetron injection 4 mg    piperacillin-tazobactam 4.5 g in dextrose 5 % 100 mL IVPB (ready to mix system)    pneumoc 13-arianne conj-dip cr(PF) 0.5 mL    sodium chloride 0.9% flush 3 mL       Objective:     Vital Signs (Most Recent):  Temp: 98.4 °F (36.9 °C) (09/30/17 1100)  Pulse: (!) 111 (09/30/17 1100)  Resp: (!) 32 (09/30/17 1100)  BP: 129/63 (09/30/17 1100)  SpO2: 99 % (09/30/17 1100) Vital Signs (24h Range):  Temp:  [98 °F (36.7 °C)-99 °F (37.2 °C)] 98.4 °F (36.9 °C)  Pulse:  [103-116] 111  Resp:  [25-39] 32  SpO2:  [95 %-99 %] 99 %  BP: (109-137)/(54-76) 129/63     Weight: 97.1 kg (214 lb 1.1 oz) (09/27/17 2327)  Body mass index is 33.53 kg/m².    Physical Exam   Constitutional: No distress.   obtunded   HENT:   Mouth/Throat: No oropharyngeal exudate.   Eyes: Conjunctivae are normal. Scleral icterus is present.   Neck: Neck supple. No tracheal deviation present.   Cardiovascular: Regular rhythm and normal heart sounds.  Exam reveals no gallop and no friction rub.    No murmur heard.  tachycardic   Pulmonary/Chest: Effort normal. No respiratory distress. He has no wheezes. He has rales (scattered).   Audible grunting, occasional productive cough   Abdominal: Soft. Bowel sounds are normal. He exhibits distension. There is tenderness. There is no guarding.   Neurological:   More alert, however not oriented   Skin: Skin is warm and dry. He is not diaphoretic.   icteric       MELD-Na score: 33 at 9/30/2017  3:00 AM  MELD score: 33 at 9/30/2017  3:00 AM  Calculated from:  Serum Creatinine: 2.2 mg/dL at 9/30/2017  3:00 AM  Serum  Sodium: 139 mmol/L (Rounded to 137) at 9/30/2017  3:00 AM  Total Bilirubin: 28.3 mg/dL at 9/30/2017  3:00 AM  INR(ratio): 1.8 at 9/30/2017  3:00 AM  Age: 27 years    Significant Labs:  CBC:   Recent Labs  Lab 09/30/17  0300   WBC 18.74*   RBC 2.71*   HGB 10.5*   HCT 28.8*        CMP:   Recent Labs  Lab 09/30/17  0300   *   CALCIUM 8.4*   ALBUMIN 2.1*   PROT 6.0      K 3.3*   CO2 16*      BUN 56*   CREATININE 2.2*   ALKPHOS 99   ALT 57*   *   BILITOT 28.3*     Coagulation:   Recent Labs  Lab 09/28/17  1727  09/30/17  0300   INR 1.8*  < > 1.8*   APTT 32.1*  --   --    < > = values in this interval not displayed.    Significant Imaging:  Labs: Reviewed  .

## 2017-09-30 NOTE — ASSESSMENT & PLAN NOTE
Patient critically ill with AMS and evidence of liver failure.    Recommendations:  Patient is currently too ill for transplant evaluation at this time.  Addiction psych will re-visit with patient if/when mental status improves.   Start Rifaximin  Consider starting tube feeds today   Consider PO lactulose today    Continue supportive care - had lengthy conversation with mother: patient will need to significantly improve from a medical standpoint to undergo a transplant evaluation for risk stratification.  His significant ETOH use is of concern and may be a barrier to transplant.  That being said, his young age may allow some measure of recovery from severe alcoholic hepatitis/cirrhosis given continued abstinence.     Please obtain outside records from [facility] [place] and the [facility].

## 2017-09-30 NOTE — ASSESSMENT & PLAN NOTE
- Worsening renal function    -Cr at OSH 1.5, Cr on admit 2.6 with elevated BUN  - Renal US demonstrable for medical renal disease.   - Currently with good UOP with 40 mg of lasix IV daily.   - Reportedly with low BP at OSH; DD includes renal vein congestion from compartment syndrome (bladder pressure 33) + ATN  - Continue daily BMP and strict I/O.

## 2017-09-30 NOTE — ASSESSMENT & PLAN NOTE
- Ammonia at   - Titrate lactulose for 3-4 BMs daily; good good BM and patient's mentation has moderately improved from yesterday to today.   -consider NGT placement today

## 2017-09-30 NOTE — PLAN OF CARE
Problem: Patient Care Overview  Goal: Plan of Care Review  Outcome: Ongoing (interventions implemented as appropriate)  No acute events throughout night, more awake and speaking clearer. VS and assessment per flow sheet, patient progressing towards goals as tolerated, plan of care reviewed with Jhonny Diana and family, all concerns addressed, will continue to monitor.

## 2017-09-30 NOTE — SUBJECTIVE & OBJECTIVE
Interval History: improved this morning, alert and answering questions, family at bedside also have noted an improvement    Review of patient's allergies indicates:   Allergen Reactions    Bactrim [sulfamethoxazole-trimethoprim] Other (See Comments)     Mookie Trell Syndrome     Current Facility-Administered Medications   Medication Frequency    albumin human 25% bottle 25 g Q8H    famotidine (PF) injection 20 mg BID    fluconazole (DIFLUCAN) IVPB 400 mg 200 mL Q24H    furosemide injection 40 mg Daily    heparin (porcine) injection 5,000 Units Q12H    lactulose 20 gram/30 mL solution Soln 20 g TID    ondansetron injection 4 mg Q6H PRN    piperacillin-tazobactam 4.5 g in dextrose 5 % 100 mL IVPB (ready to mix system) Q8H    pneumoc 13-arianne conj-dip cr(PF) 0.5 mL vaccine x 1 dose    rifAXIMin tablet 550 mg BID    sodium chloride 0.9% flush 3 mL Q8H       Objective:     Vital Signs (Most Recent):  Temp: 98.4 °F (36.9 °C) (09/30/17 1500)  Pulse: (!) 112 (09/30/17 1500)  Resp: (!) 29 (09/30/17 1500)  BP: (!) 125/57 (09/30/17 1500)  SpO2: 99 % (09/30/17 1500)  O2 Device (Oxygen Therapy): room air (09/30/17 1500) Vital Signs (24h Range):  Temp:  [98 °F (36.7 °C)-98.8 °F (37.1 °C)] 98.4 °F (36.9 °C)  Pulse:  [103-116] 112  Resp:  [26-39] 29  SpO2:  [95 %-99 %] 99 %  BP: (109-137)/(56-82) 125/57     Weight: 97.1 kg (214 lb 1.1 oz) (09/27/17 2327)  Body mass index is 33.53 kg/m².  Body surface area is 2.14 meters squared.    I/O last 3 completed shifts:  In: 2585 [I.V.:335; IV Piggyback:2250]  Out: 3795 [Urine:3395; Stool:400]    Physical Exam   Constitutional: He appears well-developed.   HENT:   Head: Normocephalic and atraumatic.   Eyes: EOM are normal. Scleral icterus is present.   Neck: No JVD present.   Cardiovascular: Normal rate and regular rhythm.  Exam reveals no friction rub.    Pulmonary/Chest: Effort normal and breath sounds normal.   Abdominal: Soft. He exhibits distension. There is no rebound and  no guarding.   Musculoskeletal: He exhibits edema.   Neurological: He is alert.   Skin: Skin is warm.   jaundice

## 2017-09-30 NOTE — PLAN OF CARE
Problem: Patient Care Overview  Goal: Plan of Care Review  Pt remains confused all shift. Disoriented to time, self, situation but has moments when he is oriented to place.  NG placed this shift with no complications per order and tube feeds started. Trickle feeds at current rate 10 ml/hr with goal of 10 ml/hr.  Pt's flexi seal continues to come out and after inserting a new one came out almost immediately and some blood present. I decided at this time due to trauma not safe to insert a new one.  Pt remains on ABX this shift.   Pt follows commands.  Liver TX team saw pt this shift and spoke in detail about POC to pt and mother at bedside. Pt will need a Psych evaluation. Psych was unable to see pt when he first arrived due to Neuro status.  Pt did pass swallow study at bedside by this RN but I also made MD aware that I do not believe it is safe to feed pt. Maybe crushing pills in pureed food due to Neuro status. Pt is more awake but still very confused.   POC reviewed in detail with mother, pt, and sister at bedside. All questions answered and concerns addressed.   Will continue to monitor closely.

## 2017-09-30 NOTE — ASSESSMENT & PLAN NOTE
- Fifth of whiskey daily since patient was a teenager  - Last drink per mother 2.5 wks ago  - Denies hx of WD  - Monitor for alcohol WD sx  - Addiction psychiatry for alcohol abuse  - Follow up PETH -> in process

## 2017-09-30 NOTE — ASSESSMENT & PLAN NOTE
- Likely due to decompensated alcoholic hepatitis  - Will rule out infectious etiology given rising WCC and organ dysfunction; BCx NGTD   - CT abdomen with small volume ascites, mesenteric edema, and ground glass opacities bilateral lower lungs.    - Broad spectrum abx (vanc/zosyn/flu) to cover for hospital acquired infection (given multiple admission to hospital) and intra-abdominal etiologies, de-escalate abx as indicated   - SBP unlikely considering small volume ascites found on CT- no pocket found on bedside US.     -MELD-Na score: 33 at 9/30/2017  3:00 AM  MELD score: 33 at 9/30/2017  3:00 AM  Calculated from:  Serum Creatinine: 2.2 mg/dL at 9/30/2017  3:00 AM  Serum Sodium: 139 mmol/L (Rounded to 137) at 9/30/2017  3:00 AM  Total Bilirubin: 28.3 mg/dL at 9/30/2017  3:00 AM  INR(ratio): 1.8 at 9/30/2017  3:00 AM  Age: 27 years  - Maddrey's discriminatory function was 48 on admit, will hold off on steroids now as work up for active infection underway.   - Hepatitis panel negative.   - OSH workup: negative jae ab, ceruloplasmin, and sm muscle ab; imaging at OSH notable for hepatosplenomegaly (although no CDs were sent for review)  - Hepatology consulted and will follow this recs; considering high rate of recidivism he is likely not candidate for liver transplant.   - Following hepatology recommendations.

## 2017-09-30 NOTE — PROGRESS NOTES
"[facility]  Critical Care Medicine  Progress Note    Patient Name: Jhonny Diana  MRN: [id number]  Admission Date: 9/27/2017  Hospital Length of Stay: 3 days  Code Status: Full Code  Attending Provider: Sajan Sandra MD  Primary Care Provider: Provider Notinsystem   Principal Problem: Alcoholic hepatitis without ascites    Subjective:     HPI:  26 yo male with hx of childhood asthma and heavy alcohol abuse (fifth of liquor daily since teenager, last drink 2.5 wks ago per mother) who was transferred to [facility] for higher level of care/liver transplant evaluation. Patient initially presented to OSH for a 2 mo hx of progressively worsening abdominal pain with associated fatigue, jaundice, and abdominal distention with ~20# weight gain. HPI given by patient's mother and OSH records as patient's participation in interview limited by previous administration of pain medication at OSH.     Per mother, patient did not have a fever, chills, dark stools, known sick contacts, nausea, vomiting, diarrhea, confusion, or HA. Reports family hx of alcoholic cirrhosis in father. Pt was recently diagnosed with alcoholic hepatitis during his admission at OSH on 9/6-9/15. During that hospitalization, patient was given ceftriaxone for SBP ppx, prednisone for alcoholic hepatitis, and diuresed. Pt was discharged and instructed to follow up with PCP and AA within 1 wk. After his discharge, patient returned to ED 4 days after for increased weight gain and worsening abdominal pain/jaundice. At OSH, patient was given fluids (for suspected pancreatitis), diuresed and given IV albumin. Two days prior to transfer to [facility], patient had an episode of hematemesis and underwent an EGD on 9/25 that revealed "1 sm varix in distal esophagus, no stigmata of bleeding, gastropathy of portal hypertension, and reflux esophagitis".  No further episodes of hematemesis noted since initial episode.       Recent labs at OSH: Na 134, CO2 18, BUN " 36, Cr 2, Tprot 5.9, albumin 1.9, Tbili 24, alk phos 83, , ALT51. Patient also had lipase 532 and ammonia 118 during his previous hospitalization at OSH. Labwork also notable for negative mitochondrial ab, sm muscle ab, and ceruloplasmin 22. Imaging at OSH revealing abdo U/S (9/20): hepatosplenomegaly, CT abdo w contrast: hepatomegaly with marked fatty infiltration of the liver and mild ascites (9/6). No CDs available for review.          Hospital/ICU Course:  Admitted for decompensated alcoholic hepatitis. Pt started on albumin for HRS tx and lactulose for HE.     Interval History/Significant Events: No acute overnight. 400 ml of stool output/2550 ml of UOP x 24 hours. Oriented to person this morning. Per mother at bedside, he had a small conversation with her overnight.       Review of Systems   Unable to perform ROS: Mental status change     Objective:     Vital Signs (Most Recent):  Temp: 98 °F (36.7 °C) (09/30/17 0300)  Pulse: (!) 111 (09/30/17 0500)  Resp: (!) 27 (09/30/17 0500)  BP: 128/67 (09/30/17 0500)  SpO2: 99 % (09/30/17 0500) Vital Signs (24h Range):  Temp:  [98 °F (36.7 °C)-99 °F (37.2 °C)] 98 °F (36.7 °C)  Pulse:  [104-116] 111  Resp:  [22-39] 27  SpO2:  [95 %-99 %] 99 %  BP: (109-142)/(54-76) 128/67   Weight: 97.1 kg (214 lb 1.1 oz)  Body mass index is 33.53 kg/m².      Intake/Output Summary (Last 24 hours) at 09/30/17 0549  Last data filed at 09/30/17 0500   Gross per 24 hour   Intake             1925 ml   Output             3010 ml   Net            -1085 ml       Physical Exam   Constitutional: No distress.   obtunded   HENT:   Mouth/Throat: No oropharyngeal exudate.   Eyes: Scleral icterus is present.   Neck: Neck supple. No tracheal deviation present.   Cardiovascular: Regular rhythm and normal heart sounds.  Exam reveals no gallop and no friction rub.    No murmur heard.  tachycardic   Pulmonary/Chest: Effort normal. No respiratory distress. He has no wheezes. He has rales (scattered).    Abdominal: Soft. Bowel sounds are normal. He exhibits distension (improving). There is no tenderness. There is no guarding.   Musculoskeletal: He exhibits edema (2+ abdominal, sacral and bilateral LE pitting edema. ).   Neurological:   Follows commands, oriented to person this morning.      Skin: Skin is warm and dry. He is not diaphoretic.   icteric       Vents:     Lines/Drains/Airways     Drain                 Urethral Catheter 09/27/17 3 days         Fecal Incontinence  09/28/17 1900 1 day          Peripheral Intravenous Line                 Peripheral IV - Single Lumen Left Hand -- days         Midline Catheter Insertion/Assessment  - Single Lumen 09/27/17 1800 Right 2 days              Significant Labs:    CBC/Anemia Profile:    Recent Labs  Lab 09/28/17  1727 09/29/17  0300 09/30/17  0300   WBC 17.56* 18.73* 18.74*   HGB 10.5* 10.7* 10.5*   HCT 29.1* 29.7* 28.8*    244 222   * 107* 106*   RDW 14.5 14.5 14.9*        Chemistries:    Recent Labs  Lab 09/28/17  1727 09/29/17  0300 09/29/17  1616 09/30/17  0300   * 138 135* 139   K 2.7* 2.8* 3.3* 3.3*    106 105 109   CO2 17* 17* 15* 16*   BUN 48* 49* 55* 56*   CREATININE 2.4* 2.2* 2.1* 2.2*   CALCIUM 8.4* 8.1* 8.2* 8.4*   ALBUMIN 1.9* 1.8*  --  2.1*   PROT 6.0 5.9*  --  6.0   BILITOT 28.0* 28.1*  --  28.3*   ALKPHOS 98 102  --  99   ALT 50* 52*  --  57*   * 105*  --  107*   MG 2.1 2.1 2.1 1.9   PHOS  --  3.6  --  2.7       Significant Imaging:  Imaging Results          US Retroperitoneal Complete (Kidney and (Final result)  Result time 09/28/17 16:55:50    Final result by Karolina Varner MD (09/28/17 16:55:50)                 Impression:        Findings most consistent with medical renal disease noting slight technical limitations detailed above.  ______________________________________     Electronically signed by resident: CHAN LANGFORD MD  Date:     09/28/17  Time:    16:35            As the supervising and  teaching physician, I personally reviewed the images and resident's interpretation and I agree with the findings.          Electronically signed by: JUANJOSE HARTMAN MD  Date:     09/28/17  Time:    16:55              Narrative:    ULTRASOUND RETROPERITONEAL COMPLETE    INDICATION: Acute kidney injury     COMPARISON: None.    TECHNIQUE: Transabdominal ultrasonographic images in the longitudinal and transverse axes of the right and left kidneys were obtained. Additionally, spectral doppler images of mid segmental arteries in both kidneys with resistive indices were acquired. Longitudinal and transverse images of the bladder were subsequently obtained.     FINDINGS:     This examination is somewhat limited due to significant artifact overlying left renal fossa with mild obscuration of the left kidney.    The kidneys are normal in size measuring 10.7 cm and the right and 10.3 cm on the left.  Renal morphology appears normal.  No solid mass, nephrolithiasis, or hydronephrosis.  There is decreased perfusion bilaterally and increase renal artery resistive indices measuring 0.85 on the right and 0.78 on the left.    BLADDER:  Collapsed around a Garcia catheter.                             CT Abdomen Pelvis  Without Contrast (Final result)  Result time 09/28/17 15:05:53    Final result by William Liz Jr., MD (09/28/17 15:05:53)                 Impression:         Hepatomegaly.      Small volume ascites, mesenteric edema, portal colopathy, anasarca and mild splenomegaly suggesting sequela of portal hypertension.      Patchy confluent groundglass opacities throughout both lower lungs, probably edema, aspiration, or pneumonia.    Prominent loops of small bowel containing fecal material mixed with scattered gas density suggesting slow transit.   ______________________________________     Electronically signed by resident: ROSA JAMIL MD  Date:     09/28/17  Time:    14:12            As the supervising and teaching  physician, I personally reviewed the images and resident's interpretation and I agree with the findings.            Electronically signed by: LENORE SINGH MD  Date:     09/28/17  Time:    15:05              Narrative:    Routine CT of the Abdomen and Pelvis without IV contrast      HISTORY:  27 year old M with alcoholic hepatitis     COMPARISON: Ultrasound 09/28/2017     FINDINGS:  Heart: Normal in size. No pericardial effusion.     Lung Bases: There is patchy groundglass opacities throughout both lungs with some air bronchograms.  There is a trace RIGHT pleural effusion.     Liver: The liver is enlarged with homogenous attenuation..  There are no focal hepatic lesions. The assessment of solid organs is limited in the absence of IV contrast.  Patient's arms within the gantry results in beam hardening artifact.      Gallbladder: No calcified gallstones.     Bile Ducts: No evidence of dilated ducts.     Pancreas: Unremarkable.      Spleen: The spleen is upper limit of normal in size.     Adrenals: Normal.     GI Tract/Mesentery: There prominent loops of small bowel containing fecal material mixed with scattered gas density. The transverse colon is distended with gas density.    There is mild wall thickening and pericolonic fat stranding in the RIGHT colon.  There is a suture material near the ileocecal junction.  No evidence of high-grade bowel obstruction or inflammation. There is a rectal tube in place.    Kidneys/ Ureters: Normal in size and location.No hydronephrosis or nephrolithiasis. No ureteral dilatation.     Bladder: There is a focus of air density within the bladder which contains a Garcia catheter likely related to instrumentation.     Reproductive organs: Unremarkable.     Retroperitoneum:  No significant adenopathy.      Peritoneal Space: There is moderate diffuse mesenteric edema and small volume ascites in the lower abdomen and pelvis. No free air.     Abdominal wall:  There is diffuse body wall edema.      Vasculature: No significant atherosclerosis or aneurysm. There is a small recanalized umbilical vein.    Bones: No acute fracture. Age-appropriate degenerative changes.                             CT Head Without Contrast (Final result)  Result time 09/28/17 13:06:39    Final result by Kednra Gordon MD (09/28/17 13:06:39)                 Impression:        No acute intracranial abnormality definitively seen allowing for streak artifact.        Electronically signed by: KENDRA GORDON MD, MD  Date:     09/28/17  Time:    13:06              Narrative:    COMPARISON: None    FINDINGS: Patient is rotated and tilted within the scanner. Beam hardening with streak artifact somewhat limits evaluation. The brain appears normally formed without convincing evidence of hemorrhage, mass, midline shift or acute major vascular territory infarct.  Gray-white interface appears intact.  The ventricular system is normal in size for age and demonstrates no distortion by mass effect.      No extra-axial hemorrhage or mass. The extracranial structures are within normal limits.  No displaced calvarial fracture.  Imaged portions of the paranasal sinuses and mastoid air cells are clear. Imaged portions of the orbits are within normal limits.                             X-Ray Chest 1 View (Final result)  Result time 09/28/17 07:43:17    Final result by Gildardo Davies III, MD (09/28/17 07:43:17)                 Impression:     Pulmonary edema versus pneumonia.      Electronically signed by: GILDARDO DAVIES  Date:     09/28/17  Time:    07:43              Narrative:    One view: There is cardiomegaly, moderate edema.  There is no prior.                             US Abdomen Complete with Dopp_Pre Liver Transplant (Final result)  Result time 09/28/17 04:00:54    Final result by Bong Raymond MD (09/28/17 04:00:54)                 Impression:        1.  Cirrhosis and sequelae of hypertension including mild splenomegaly, recanalized  umbilical vein and splenorenal collaterals, and trace ascites.    2.  Otherwise satisfactory Doppler evaluation without evidence of portal vein thrombus.    3.  Heterogeneous parenchyma without evidence of mass lesion.    4.  Hepatomegaly.  ______________________________________     Electronically signed by resident: BRANDEN SORTO MD  Date:     09/28/17  Time:    03:51            As the supervising and teaching physician, I personally reviewed the images and resident's interpretation and I agree with the findings.          Electronically signed by: BERHANE WILKINS MD  Date:     09/28/17  Time:    04:00              Narrative:    Indication: Evaluation for liver transplant.    Technique: Abdominal ultrasound with Doppler was performed.    Comparison: None.    Findings:    The pancreas is obscured by midline bowel gas.    The liver is significantly enlarged measuring 22.4 cm.  The liver demonstrates a subtle nodular contour and heterogeneous parenchyma compatible with cirrhosis.  The liver demonstrates diffuse sonographic attenuation compatible with hepatic steatosis.  No focal abnormalities are identified within the liver.    No intra-or extra hepatic biliary ductal dilatation.  The common bile duct measures 5 mm.    The gallbladder is unremarkable.  Sonographic Chang sign is negative.    The spleen is mildly enlarged measuring 14.0 x 4.8 cm.  No focal abnormalities within the spleen.    The kidneys measure 12.3 and 11.9 cm on the right and left respectively.  No obvious mass or hydronephrosis.  The aorta is obscured by midline bowel gas.    Trace ascites.    Color Doppler images demonstrate appropriate antegrade flow within the hepatic veins, main portal vein, portal venous branches, and IVC.  No portal vein thrombus.  The main hepatic artery is patent without tardus parvus waveform.  Recanalized umbilical vein and mild splenorenal collateral vessels.                                Assessment/Plan:     Psychiatric    Alcoholism /alcohol abuse    - Fifth of whiskey daily since patient was a teenager  - Last drink per mother 2.5 wks ago  - Denies hx of WD  - Monitor for alcohol WD sx  - Addiction psychiatry for alcohol abuse  - Follow up PETH -> in process        Pulmonary   Pneumonia of both lower lobes due to infectious organism    - As seen on CT abdomen.  - stable leukocytosis  - BP stable and patient has remained afebrile, moderately tachycardic in HR 110s.  - Continue Zosyn/Vanc   -vanc level this AM 20        Renal/   Hypokalemia    - Secondary to diarrhea from lactulose.   - IV replenishment PRN.         Increased anion gap metabolic acidosis    - Suspect 2/2 to acute renal failure; gap mildly elevated at 15 with lactic acid wnl.   - BHB not elevated.  - Continue daily BMP.           Acute renal failure with tubular necrosis    - Worsening renal function    -Cr at OSH 1.5, Cr on admit 2.6 with elevated BUN  - Renal US demonstrable for medical renal disease.   - Currently with good UOP with 40 mg of lasix IV daily.   - Reportedly with low BP at OSH; DD includes renal vein congestion from compartment syndrome (bladder pressure 33) + ATN  - Continue daily BMP and strict I/O.           GI   * Alcoholic hepatitis without ascites    - Likely due to decompensated alcoholic hepatitis  - Will rule out infectious etiology given rising WCC and organ dysfunction; BCx NGTD   - CT abdomen with small volume ascites, mesenteric edema, and ground glass opacities bilateral lower lungs.    - Broad spectrum abx (vanc/zosyn/flu) to cover for hospital acquired infection (given multiple admission to hospital) and intra-abdominal etiologies, de-escalate abx as indicated   - SBP unlikely considering small volume ascites found on CT- no pocket found on bedside US.     -MELD-Na score: 33 at 9/30/2017  3:00 AM  MELD score: 33 at 9/30/2017  3:00 AM  Calculated from:  Serum Creatinine: 2.2 mg/dL at 9/30/2017  3:00 AM  Serum Sodium: 139 mmol/L  (Rounded to 137) at 9/30/2017  3:00 AM  Total Bilirubin: 28.3 mg/dL at 9/30/2017  3:00 AM  INR(ratio): 1.8 at 9/30/2017  3:00 AM  Age: 27 years  - Mundo's discriminatory function was 48 on admit, will hold off on steroids now as work up for active infection underway.   - Hepatitis panel negative.   - OSH workup: negative jae ab, ceruloplasmin, and sm muscle ab; imaging at OSH notable for hepatosplenomegaly (although no CDs were sent for review)  - Hepatology consulted and will follow this recs; considering high rate of recidivism he is likely not candidate for liver transplant.   - Following hepatology recommendations.         Acute alcoholic pancreatitis    - Mentioned at OSH, lipase not elevated here.         History of hematemesis    - Pt had an episode of hematemesis at OSH   - Underwent EGD that revealed 1 small varix, reflux esophagitis, and gastropathy of portal hypertension  - H/H stable at 10   - Continue to monitor closely for blood loss  - GI prophylaxis with famotidine 20 mg IV BID.         Hepatic encephalopathy    - Ammonia at   - Titrate lactulose for 3-4 BMs daily; good good BM and patient's mentation has moderately improved from yesterday to today.   -will attempt bedside swallow, if fails, will put in NGT and start trickle feeds/lactulose          Other   Anasarca    - Multifactorial (renal/liver dysfunction); albumin 1.8  - Will diurese and closely monitor labs/urine output   - Lasix IV 40 mg daily; uptitrate as indicated           Critical Care Daily Checklist:    A: Awake: RASS Goal/Actual Goal: RASS Goal: 0-->alert and calm  Actual: Robert Agitation Sedation Scale (RASS): Restless   B: Spontaneous Breathing Trial Performed?     C: SAT & SBT Coordinated?  NA                      D: Delirium: CAM-ICU Overall CAM-ICU: Positive   E: Early Mobility Performed? No   F: Feeding Goal:    Status:     Current Diet Order   Procedures    Diet NPO Except for: Sips with Medication     Until abdo  ultrasound is completed, then can start on sodium restricted/renal diet with 1L fluid restriction     Order Specific Question:   Except for     Answer:   Sips with Medication      AS: Analgesia/Sedation None   T: Thromboembolic Prophylaxis Will initiate heparin, H/H stable and no s/s of bleed   H: HOB > 300 yes   U: Stress Ulcer Prophylaxis (if needed) famotidine   G: Glucose Control monitor   B: Bowel Function lactulose   I: Indwelling Catheter (Lines & Garcia) Necessity Garcia, flexi-seal   D: De-escalation of Antimicrobials/Pharmacotherapies Vanc/zosyn/flu    Plan for the day/ETD Possible NGT placement, continue lactulose for HE    Code Status:  Family/Goals of Care: Full Code         Staff attestation to follow.        TED Tuttle  Critical Care Medicine  [facility]

## 2017-09-30 NOTE — HOSPITAL COURSE
Mr. Diana was admitted for decompensated alcoholic hepatitis with high MELD score, severe HE, and LAURA. Hepatology consulted and following to evaluate liver transplant candidacy. Patient not currently a transplant candidate due to recent alcohol use; recommending re-evaluation by addiction psych and social work when more medically stable and able to participate. Pt started on albumin and lasix for LAURA and possibility for HRS. However nephrology felt that LAURA is likely multifactorial: ATN given hypotensive episodes at OSH and toxic given severe hyperbilirubinemia; not truly HRS. Lactulose/rifaximin started for HE and mental status improving slowly. Mr. Diana has had significant ABD pain since admission with ABD distension. CT abdomen was significant for pericolonic fat stranding of right colon; no obstruction. Also with diffuse mesenteric edema. No significant ascites noted, no cholecystitis/cholelithiasis/ductal dilation. US liver with doppler showed appropriate vasculature.

## 2017-09-30 NOTE — PROGRESS NOTES
"[facility]  Hepatology  Progress Note    Patient Name: Jhonny iDana  MRN: [id number]  Admission Date: 9/27/2017  Hospital Length of Stay: 3 days  Attending Provider: Sajan Sandra MD   Primary Care Physician: Provider Notinsystem  Principal Problem:Alcoholic hepatitis without ascites    Subjective:     Transplant status: No    HPI: 26 yo male with hx of childhood asthma and heavy alcohol abuse (fifth of liquor daily since teenager, last drink 2.5 wks ago per mother) who was transferred to [facility] for higher level of care/liver transplant evaluation.    History obtained per chart review and discussion with mother: Patient initially presented to OSH for a 2 mo hx of progressively worsening abdominal pain with associated fatigue, jaundice, and abdominal distention with ~20# weight gain.     The patient's mother states he began having abdominal pain about 6 months ago.  States he underwent many tests, all of which were inconclusive.  He then began to have abdominal swelling about two months ago and was in&out of the local ER.  He was still actively drinking up until about 2.5 weeks ago according to the mother but she is unsure of this.  At the OSH the patient underwent an EGD which showed g1EV.  He was also diagnosed with "pancreatitis" based on an elevated lipase.  The patient was also placed on prednisone given concerns for alcoholic hepatitis.           Interval History: Mental status improving - more alert however still not oriented.     Current Facility-Administered Medications   Medication    albumin human 25% bottle 25 g    famotidine (PF) injection 20 mg    fluconazole (DIFLUCAN) IVPB 400 mg 200 mL    furosemide injection 40 mg    heparin (porcine) injection 5,000 Units    lactulose 10 gram/15 mL solution (enema) 200 g    ondansetron injection 4 mg    piperacillin-tazobactam 4.5 g in dextrose 5 % 100 mL IVPB (ready to mix system)    pneumoc 13-arianne conj-dip cr(PF) 0.5 mL    sodium " chloride 0.9% flush 3 mL       Objective:     Vital Signs (Most Recent):  Temp: 98.4 °F (36.9 °C) (09/30/17 1100)  Pulse: (!) 111 (09/30/17 1100)  Resp: (!) 32 (09/30/17 1100)  BP: 129/63 (09/30/17 1100)  SpO2: 99 % (09/30/17 1100) Vital Signs (24h Range):  Temp:  [98 °F (36.7 °C)-99 °F (37.2 °C)] 98.4 °F (36.9 °C)  Pulse:  [103-116] 111  Resp:  [25-39] 32  SpO2:  [95 %-99 %] 99 %  BP: (109-137)/(54-76) 129/63     Weight: 97.1 kg (214 lb 1.1 oz) (09/27/17 2327)  Body mass index is 33.53 kg/m².    Physical Exam   Constitutional: No distress.   obtunded   HENT:   Mouth/Throat: No oropharyngeal exudate.   Eyes: Conjunctivae are normal. Scleral icterus is present.   Neck: Neck supple. No tracheal deviation present.   Cardiovascular: Regular rhythm and normal heart sounds.  Exam reveals no gallop and no friction rub.    No murmur heard.  tachycardic   Pulmonary/Chest: Effort normal. No respiratory distress. He has no wheezes. He has rales (scattered).   Audible grunting, occasional productive cough   Abdominal: Soft. Bowel sounds are normal. He exhibits distension. There is tenderness. There is no guarding.   Neurological:   More alert, however not oriented   Skin: Skin is warm and dry. He is not diaphoretic.   icteric       MELD-Na score: 33 at 9/30/2017  3:00 AM  MELD score: 33 at 9/30/2017  3:00 AM  Calculated from:  Serum Creatinine: 2.2 mg/dL at 9/30/2017  3:00 AM  Serum Sodium: 139 mmol/L (Rounded to 137) at 9/30/2017  3:00 AM  Total Bilirubin: 28.3 mg/dL at 9/30/2017  3:00 AM  INR(ratio): 1.8 at 9/30/2017  3:00 AM  Age: 27 years    Significant Labs:  CBC:   Recent Labs  Lab 09/30/17  0300   WBC 18.74*   RBC 2.71*   HGB 10.5*   HCT 28.8*        CMP:   Recent Labs  Lab 09/30/17  0300   *   CALCIUM 8.4*   ALBUMIN 2.1*   PROT 6.0      K 3.3*   CO2 16*      BUN 56*   CREATININE 2.2*   ALKPHOS 99   ALT 57*   *   BILITOT 28.3*     Coagulation:   Recent Labs  Lab 09/28/17  6605   09/30/17  0300   INR 1.8*  < > 1.8*   APTT 32.1*  --   --    < > = values in this interval not displayed.    Significant Imaging:  Labs: Reviewed  .    Assessment/Plan:     Decompensated hepatic cirrhosis    Patient critically ill with AMS and evidence of liver failure.    Recommendations:  Patient is currently too ill for transplant evaluation at this time.  Addiction psych will re-visit with patient if/when mental status improves.   Start Rifaximin  Consider starting tube feeds today   Consider PO lactulose today    Continue supportive care - had lengthy conversation with mother: patient will need to significantly improve from a medical standpoint to undergo a transplant evaluation for risk stratification.  His significant ETOH use is of concern and may be a barrier to transplant.  That being said, his young age may allow some measure of recovery from severe alcoholic hepatitis/cirrhosis given continued abstinence.     Please obtain outside records from [facility] in [place] and the [facility].           Acute renal failure with tubular necrosis    Appreciate Nephrology recs            Thank you for your consult. I will follow-up with patient. Please contact us if you have any additional questions.    Bairon Tam   Gastroenterology Fellow PGY VI  [phone]

## 2017-09-30 NOTE — SUBJECTIVE & OBJECTIVE
Interval History/Significant Events: No acute overnight. 400 ml of stool output/2550 ml of UOP x 24 hours. Oriented to person this morning. Per mother at bedside, he had a small conversation with her overnight.       Review of Systems   Unable to perform ROS: Mental status change     Objective:     Vital Signs (Most Recent):  Temp: 98 °F (36.7 °C) (09/30/17 0300)  Pulse: (!) 111 (09/30/17 0500)  Resp: (!) 27 (09/30/17 0500)  BP: 128/67 (09/30/17 0500)  SpO2: 99 % (09/30/17 0500) Vital Signs (24h Range):  Temp:  [98 °F (36.7 °C)-99 °F (37.2 °C)] 98 °F (36.7 °C)  Pulse:  [104-116] 111  Resp:  [22-39] 27  SpO2:  [95 %-99 %] 99 %  BP: (109-142)/(54-76) 128/67   Weight: 97.1 kg (214 lb 1.1 oz)  Body mass index is 33.53 kg/m².      Intake/Output Summary (Last 24 hours) at 09/30/17 0549  Last data filed at 09/30/17 0500   Gross per 24 hour   Intake             1925 ml   Output             3010 ml   Net            -1085 ml       Physical Exam   Constitutional: No distress.   obtunded   HENT:   Mouth/Throat: No oropharyngeal exudate.   Eyes: Scleral icterus is present.   Neck: Neck supple. No tracheal deviation present.   Cardiovascular: Regular rhythm and normal heart sounds.  Exam reveals no gallop and no friction rub.    No murmur heard.  tachycardic   Pulmonary/Chest: Effort normal. No respiratory distress. He has no wheezes. He has rales (scattered).   Abdominal: Soft. Bowel sounds are normal. He exhibits distension (improving). There is no tenderness. There is no guarding.   Musculoskeletal: He exhibits edema (2+ abdominal, sacral and bilateral LE pitting edema. ).   Neurological:   Follows commands, oriented to person this morning.      Skin: Skin is warm and dry. He is not diaphoretic.   icteric       Vents:     Lines/Drains/Airways     Drain                 Urethral Catheter 09/27/17 3 days         Fecal Incontinence  09/28/17 1900 1 day          Peripheral Intravenous Line                 Peripheral IV -  Single Lumen Left Hand -- days         Midline Catheter Insertion/Assessment  - Single Lumen 09/27/17 1800 Right 2 days              Significant Labs:    CBC/Anemia Profile:    Recent Labs  Lab 09/28/17  1727 09/29/17  0300 09/30/17  0300   WBC 17.56* 18.73* 18.74*   HGB 10.5* 10.7* 10.5*   HCT 29.1* 29.7* 28.8*    244 222   * 107* 106*   RDW 14.5 14.5 14.9*        Chemistries:    Recent Labs  Lab 09/28/17  1727 09/29/17  0300 09/29/17  1616 09/30/17  0300   * 138 135* 139   K 2.7* 2.8* 3.3* 3.3*    106 105 109   CO2 17* 17* 15* 16*   BUN 48* 49* 55* 56*   CREATININE 2.4* 2.2* 2.1* 2.2*   CALCIUM 8.4* 8.1* 8.2* 8.4*   ALBUMIN 1.9* 1.8*  --  2.1*   PROT 6.0 5.9*  --  6.0   BILITOT 28.0* 28.1*  --  28.3*   ALKPHOS 98 102  --  99   ALT 50* 52*  --  57*   * 105*  --  107*   MG 2.1 2.1 2.1 1.9   PHOS  --  3.6  --  2.7       Significant Imaging:  Imaging Results          US Retroperitoneal Complete (Kidney and (Final result)  Result time 09/28/17 16:55:50    Final result by Karolina Hatrman MD (09/28/17 16:55:50)                 Impression:        Findings most consistent with medical renal disease noting slight technical limitations detailed above.  ______________________________________     Electronically signed by resident: CHAN LANGFORD MD  Date:     09/28/17  Time:    16:35            As the supervising and teaching physician, I personally reviewed the images and resident's interpretation and I agree with the findings.          Electronically signed by: KAROLINA HARTMAN MD  Date:     09/28/17  Time:    16:55              Narrative:    ULTRASOUND RETROPERITONEAL COMPLETE    INDICATION: Acute kidney injury     COMPARISON: None.    TECHNIQUE: Transabdominal ultrasonographic images in the longitudinal and transverse axes of the right and left kidneys were obtained. Additionally, spectral doppler images of mid segmental arteries in both kidneys with resistive indices were acquired.  Longitudinal and transverse images of the bladder were subsequently obtained.     FINDINGS:     This examination is somewhat limited due to significant artifact overlying left renal fossa with mild obscuration of the left kidney.    The kidneys are normal in size measuring 10.7 cm and the right and 10.3 cm on the left.  Renal morphology appears normal.  No solid mass, nephrolithiasis, or hydronephrosis.  There is decreased perfusion bilaterally and increase renal artery resistive indices measuring 0.85 on the right and 0.78 on the left.    BLADDER:  Collapsed around a Garcia catheter.                             CT Abdomen Pelvis  Without Contrast (Final result)  Result time 09/28/17 15:05:53    Final result by Lenore Liz Jr., MD (09/28/17 15:05:53)                 Impression:         Hepatomegaly.      Small volume ascites, mesenteric edema, portal colopathy, anasarca and mild splenomegaly suggesting sequela of portal hypertension.      Patchy confluent groundglass opacities throughout both lower lungs, probably edema, aspiration, or pneumonia.    Prominent loops of small bowel containing fecal material mixed with scattered gas density suggesting slow transit.   ______________________________________     Electronically signed by resident: ROSA JAMIL MD  Date:     09/28/17  Time:    14:12            As the supervising and teaching physician, I personally reviewed the images and resident's interpretation and I agree with the findings.            Electronically signed by: LENORE LIZ MD  Date:     09/28/17  Time:    15:05              Narrative:    Routine CT of the Abdomen and Pelvis without IV contrast      HISTORY:  27 year old M with alcoholic hepatitis     COMPARISON: Ultrasound 09/28/2017     FINDINGS:  Heart: Normal in size. No pericardial effusion.     Lung Bases: There is patchy groundglass opacities throughout both lungs with some air bronchograms.  There is a trace RIGHT pleural effusion.      Liver: The liver is enlarged with homogenous attenuation..  There are no focal hepatic lesions. The assessment of solid organs is limited in the absence of IV contrast.  Patient's arms within the gantry results in beam hardening artifact.      Gallbladder: No calcified gallstones.     Bile Ducts: No evidence of dilated ducts.     Pancreas: Unremarkable.      Spleen: The spleen is upper limit of normal in size.     Adrenals: Normal.     GI Tract/Mesentery: There prominent loops of small bowel containing fecal material mixed with scattered gas density. The transverse colon is distended with gas density.    There is mild wall thickening and pericolonic fat stranding in the RIGHT colon.  There is a suture material near the ileocecal junction.  No evidence of high-grade bowel obstruction or inflammation. There is a rectal tube in place.    Kidneys/ Ureters: Normal in size and location.No hydronephrosis or nephrolithiasis. No ureteral dilatation.     Bladder: There is a focus of air density within the bladder which contains a Garcia catheter likely related to instrumentation.     Reproductive organs: Unremarkable.     Retroperitoneum:  No significant adenopathy.      Peritoneal Space: There is moderate diffuse mesenteric edema and small volume ascites in the lower abdomen and pelvis. No free air.     Abdominal wall:  There is diffuse body wall edema.     Vasculature: No significant atherosclerosis or aneurysm. There is a small recanalized umbilical vein.    Bones: No acute fracture. Age-appropriate degenerative changes.                             CT Head Without Contrast (Final result)  Result time 09/28/17 13:06:39    Final result by Kendra La MD (09/28/17 13:06:39)                 Impression:        No acute intracranial abnormality definitively seen allowing for streak artifact.        Electronically signed by: KENDRA LA MD, MD  Date:     09/28/17  Time:    13:06              Narrative:    COMPARISON:  None    FINDINGS: Patient is rotated and tilted within the scanner. Beam hardening with streak artifact somewhat limits evaluation. The brain appears normally formed without convincing evidence of hemorrhage, mass, midline shift or acute major vascular territory infarct.  Gray-white interface appears intact.  The ventricular system is normal in size for age and demonstrates no distortion by mass effect.      No extra-axial hemorrhage or mass. The extracranial structures are within normal limits.  No displaced calvarial fracture.  Imaged portions of the paranasal sinuses and mastoid air cells are clear. Imaged portions of the orbits are within normal limits.                             X-Ray Chest 1 View (Final result)  Result time 09/28/17 07:43:17    Final result by Gildardo Davies III, MD (09/28/17 07:43:17)                 Impression:     Pulmonary edema versus pneumonia.      Electronically signed by: GILDARDO DAVIES  Date:     09/28/17  Time:    07:43              Narrative:    One view: There is cardiomegaly, moderate edema.  There is no prior.                             US Abdomen Complete with Dopp_Pre Liver Transplant (Final result)  Result time 09/28/17 04:00:54    Final result by Berhane Wilkins MD (09/28/17 04:00:54)                 Impression:        1.  Cirrhosis and sequelae of hypertension including mild splenomegaly, recanalized umbilical vein and splenorenal collaterals, and trace ascites.    2.  Otherwise satisfactory Doppler evaluation without evidence of portal vein thrombus.    3.  Heterogeneous parenchyma without evidence of mass lesion.    4.  Hepatomegaly.  ______________________________________     Electronically signed by resident: BRANDEN SORTO MD  Date:     09/28/17  Time:    03:51            As the supervising and teaching physician, I personally reviewed the images and resident's interpretation and I agree with the findings.          Electronically signed by: BERHANE WILKINS MD  Date:      09/28/17  Time:    04:00              Narrative:    Indication: Evaluation for liver transplant.    Technique: Abdominal ultrasound with Doppler was performed.    Comparison: None.    Findings:    The pancreas is obscured by midline bowel gas.    The liver is significantly enlarged measuring 22.4 cm.  The liver demonstrates a subtle nodular contour and heterogeneous parenchyma compatible with cirrhosis.  The liver demonstrates diffuse sonographic attenuation compatible with hepatic steatosis.  No focal abnormalities are identified within the liver.    No intra-or extra hepatic biliary ductal dilatation.  The common bile duct measures 5 mm.    The gallbladder is unremarkable.  Sonographic Chang sign is negative.    The spleen is mildly enlarged measuring 14.0 x 4.8 cm.  No focal abnormalities within the spleen.    The kidneys measure 12.3 and 11.9 cm on the right and left respectively.  No obvious mass or hydronephrosis.  The aorta is obscured by midline bowel gas.    Trace ascites.    Color Doppler images demonstrate appropriate antegrade flow within the hepatic veins, main portal vein, portal venous branches, and IVC.  No portal vein thrombus.  The main hepatic artery is patent without tardus parvus waveform.  Recanalized umbilical vein and mild splenorenal collateral vessels.

## 2017-09-30 NOTE — ASSESSMENT & PLAN NOTE
- As seen on CT abdomen.  - stable leukocytosis  - BP stable and patient has remained afebrile, moderately tachycardic in HR 110s.  - Continue Zosyn/Vanc   -vanc level this AM 20

## 2017-09-30 NOTE — PROGRESS NOTES
[facility]  Nephrology  Progress Note    Patient Name: Jhonny Diana  MRN: [id number]  Admission Date: 9/27/2017  Hospital Length of Stay: 3 days  Attending Provider: Sajan Sandra MD   Primary Care Physician: Provider Notinsystem  Principal Problem:Alcoholic hepatitis without ascites    Subjective:     HPI: Jhonny Diana is a 26 yo male with pMHX of childhood asthma and alcohol abuse, was transferred to [facility] for hepatology evaluation for worsening liver functions.  Information was obtained from family at bedside and review of outside records, given that patient is obtunded.  According to mother at bedside, patient has had multiple admissions to the hospital over the past 6 months for recurrent abdominal pain.  She reports that he has been been seen the ED and has been diagnosed with diverticulitis, UTIs, gastroenteritis, and cholecystitis.  He was never admitted to the hospital and was always discharged.  On 09/06, patient presented to OSH for worsening abdominal pain and distension.  He was diagnosed at that time with ETOH hepatitis.  He was treated with steroids, ceftriaxone for SBP prophylaxis, and diuresed at that time for volume management.  He was discharged home on the 15th, after clearance from GI,a nd was instructed to follow-up with his PCP 1 week after discharge.  He presented to the hospital on 09/25 for worsening abdominal pain and distension with 20 kg weight gain.  He was transferred to [facility] for further care and Nephrology was consulted for LAURA.    On review of outside records, patient Scr on admission was 1.5 (with baseline at 1.0).  According to the family at the bedside, he was not urinating well at the OSH, and not responding to diuretics (spirionolactone/lasix combo).  His mother reports that patient continued to c/o abdominal and inability to urinate, but no chicas was placed until transfer to [facility] yesterday.  Record reviews shows relative hypotension with BPs in the low 90's.   His mother reports that the nursing staff was administrating his narcotic regimen based on his blood pressures, and at times, medication was held due to hypotension.  Since admission to hospital, his UOP has improved, responding well in Lasix 40 IV (770 ml since admission, 700 ml so far this shift at time of consultation).  Admit labs revealed a SCr of 2.6, this morning improved down to 2.4.  Bilirubin remains elevated, >14.  Abdomen remains with tight distension, unable to perform paracentesis, as no significant fluid pocket found.  Mother reports patient did not have para's done at OSH.    Labs on the morning of 09/27 with bicarb of 15.  ABG with alkalemia and primary respiratory alkalosis and anion gap acidosis.     Interval History: improved this morning, alert and answering questions, family at bedside also have noted an improvement    Review of patient's allergies indicates:   Allergen Reactions    Bactrim [sulfamethoxazole-trimethoprim] Other (See Comments)     Mookie Trell Syndrome     Current Facility-Administered Medications   Medication Frequency    albumin human 25% bottle 25 g Q8H    famotidine (PF) injection 20 mg BID    fluconazole (DIFLUCAN) IVPB 400 mg 200 mL Q24H    furosemide injection 40 mg Daily    heparin (porcine) injection 5,000 Units Q12H    lactulose 20 gram/30 mL solution Soln 20 g TID    ondansetron injection 4 mg Q6H PRN    piperacillin-tazobactam 4.5 g in dextrose 5 % 100 mL IVPB (ready to mix system) Q8H    pneumoc 13-arianne conj-dip cr(PF) 0.5 mL vaccine x 1 dose    rifAXIMin tablet 550 mg BID    sodium chloride 0.9% flush 3 mL Q8H       Objective:     Vital Signs (Most Recent):  Temp: 98.4 °F (36.9 °C) (09/30/17 1500)  Pulse: (!) 112 (09/30/17 1500)  Resp: (!) 29 (09/30/17 1500)  BP: (!) 125/57 (09/30/17 1500)  SpO2: 99 % (09/30/17 1500)  O2 Device (Oxygen Therapy): room air (09/30/17 1500) Vital Signs (24h Range):  Temp:  [98 °F (36.7 °C)-98.8 °F (37.1 °C)] 98.4 °F (36.9  °C)  Pulse:  [103-116] 112  Resp:  [26-39] 29  SpO2:  [95 %-99 %] 99 %  BP: (109-137)/(56-82) 125/57     Weight: 97.1 kg (214 lb 1.1 oz) (09/27/17 2327)  Body mass index is 33.53 kg/m².  Body surface area is 2.14 meters squared.    I/O last 3 completed shifts:  In: 2585 [I.V.:335; IV Piggyback:2250]  Out: 3795 [Urine:3395; Stool:400]    Physical Exam   Constitutional: He appears well-developed.   HENT:   Head: Normocephalic and atraumatic.   Eyes: EOM are normal. Scleral icterus is present.   Neck: No JVD present.   Cardiovascular: Normal rate and regular rhythm.  Exam reveals no friction rub.    Pulmonary/Chest: Effort normal and breath sounds normal.   Abdominal: Soft. He exhibits distension. There is no rebound and no guarding.   Musculoskeletal: He exhibits edema.   Neurological: He is alert.   Skin: Skin is warm.   jaundice           Assessment/Plan:     Acute renal failure with tubular necrosis    Non-Oliguric LAURA on Normal Renal Function  -Baseline SCr ~1.0.  Admitted to OSH with SCr of 1.5 and slowly increased through the hospitalization.  Upon arrival at C, SCr at 2.6.  -His LAURA is not related to HRS as patient is non-oliguric and normotensive.    -LAURA 2/2 ATN, likely multifactorial.     -Toxic ATN from hyperbilirubinemia and bile salt toxicity vs. Ischemic ATN, as patients  mother reported lower BP's while at OSH when receiving narcotics for pain.    -Urine microscopy with numerous muddy brown casts stained with bili, confirming ATN (and by definition ruling out HRS as a primary cause)   -UOP by > 2,600ccs, net negative by > 1000ccs past 24hrs  -hypokalemic on labs, 2/2 diarrhea and diuretics.  -His urine Na was low normal yesterday, would expect to be higher given that patient was on diuretics.  Likely 2/2 decreased renal perfusion from renal vascular congestion from hypervolemia.  -recommend continuing Lasix 40 mg IV QD.    -continue function improving, sCr 1.5 this morning.  No need for RRT.             Thank you for your consult. I will follow-up with patient. Please contact us if you have any additional questions.    Solis Snyder MD  Nephrology  [facility]

## 2017-10-01 LAB
ALLENS TEST: ABNORMAL
BASOPHILS # BLD AUTO: 0.02 K/UL
BASOPHILS NFR BLD: 0.1 %
DELSYS: ABNORMAL
DIFFERENTIAL METHOD: ABNORMAL
EOSINOPHIL # BLD AUTO: 0.1 K/UL
EOSINOPHIL NFR BLD: 0.3 %
ERYTHROCYTE [DISTWIDTH] IN BLOOD BY AUTOMATED COUNT: 15.3 %
HCO3 UR-SCNC: 16.8 MMOL/L (ref 24–28)
HCT VFR BLD AUTO: 30.6 %
HCT VFR BLD CALC: 29 %PCV (ref 36–54)
HGB BLD-MCNC: 10.5 G/DL
INR PPP: 1.9
LYMPHOCYTES # BLD AUTO: 1.5 K/UL
LYMPHOCYTES NFR BLD: 6.7 %
MCH RBC QN AUTO: 38 PG
MCHC RBC AUTO-ENTMCNC: 34.3 G/DL
MCV RBC AUTO: 111 FL
MODE: ABNORMAL
MONOCYTES # BLD AUTO: 1.2 K/UL
MONOCYTES NFR BLD: 5.3 %
NEUTROPHILS # BLD AUTO: 19.9 K/UL
NEUTROPHILS NFR BLD: 87.6 %
PCO2 BLDA: 20.4 MMHG (ref 35–45)
PH SMN: 7.52 [PH] (ref 7.35–7.45)
PLATELET # BLD AUTO: 170 K/UL
PLATELET BLD QL SMEAR: ABNORMAL
PMV BLD AUTO: 11.8 FL
PO2 BLDA: 76 MMHG (ref 80–100)
POC BE: -6 MMOL/L
POC IONIZED CALCIUM: 1.19 MMOL/L (ref 1.06–1.42)
POC SATURATED O2: 97 % (ref 95–100)
POC TCO2: 17 MMOL/L (ref 23–27)
POTASSIUM BLD-SCNC: 2.5 MMOL/L (ref 3.5–5.1)
PROTHROMBIN TIME: 18.9 SEC
PROVIDER CREDENTIALS: ABNORMAL
PROVIDER NOTIFIED: ABNORMAL
RBC # BLD AUTO: 2.76 M/UL
SAMPLE: ABNORMAL
SITE: ABNORMAL
SODIUM BLD-SCNC: 145 MMOL/L (ref 136–145)
VANCOMYCIN SERPL-MCNC: 9.1 UG/ML
WBC # BLD AUTO: 23 K/UL

## 2017-10-01 PROCEDURE — 80202 ASSAY OF VANCOMYCIN: CPT

## 2017-10-01 PROCEDURE — 25000003 PHARM REV CODE 250: Performed by: STUDENT IN AN ORGANIZED HEALTH CARE EDUCATION/TRAINING PROGRAM

## 2017-10-01 PROCEDURE — 25000003 PHARM REV CODE 250: Performed by: HOSPITALIST

## 2017-10-01 PROCEDURE — 36415 COLL VENOUS BLD VENIPUNCTURE: CPT

## 2017-10-01 PROCEDURE — 85025 COMPLETE CBC W/AUTO DIFF WBC: CPT

## 2017-10-01 PROCEDURE — 63600175 PHARM REV CODE 636 W HCPCS: Performed by: INTERNAL MEDICINE

## 2017-10-01 PROCEDURE — 63600175 PHARM REV CODE 636 W HCPCS: Performed by: HOSPITALIST

## 2017-10-01 PROCEDURE — A4216 STERILE WATER/SALINE, 10 ML: HCPCS | Performed by: STUDENT IN AN ORGANIZED HEALTH CARE EDUCATION/TRAINING PROGRAM

## 2017-10-01 PROCEDURE — 63600175 PHARM REV CODE 636 W HCPCS: Performed by: STUDENT IN AN ORGANIZED HEALTH CARE EDUCATION/TRAINING PROGRAM

## 2017-10-01 PROCEDURE — S0028 INJECTION, FAMOTIDINE, 20 MG: HCPCS | Performed by: STUDENT IN AN ORGANIZED HEALTH CARE EDUCATION/TRAINING PROGRAM

## 2017-10-01 PROCEDURE — 85610 PROTHROMBIN TIME: CPT

## 2017-10-01 PROCEDURE — 99233 SBSQ HOSP IP/OBS HIGH 50: CPT | Mod: ,,, | Performed by: INTERNAL MEDICINE

## 2017-10-01 PROCEDURE — 97802 MEDICAL NUTRITION INDIV IN: CPT

## 2017-10-01 PROCEDURE — 99232 SBSQ HOSP IP/OBS MODERATE 35: CPT | Mod: ,,, | Performed by: INTERNAL MEDICINE

## 2017-10-01 PROCEDURE — P9047 ALBUMIN (HUMAN), 25%, 50ML: HCPCS | Performed by: INTERNAL MEDICINE

## 2017-10-01 PROCEDURE — 25000003 PHARM REV CODE 250: Performed by: INTERNAL MEDICINE

## 2017-10-01 PROCEDURE — 20000000 HC ICU ROOM

## 2017-10-01 RX ORDER — POTASSIUM CHLORIDE 7.45 MG/ML
10 INJECTION INTRAVENOUS
Status: DISCONTINUED | OUTPATIENT
Start: 2017-10-01 | End: 2017-10-01

## 2017-10-01 RX ORDER — POTASSIUM CHLORIDE 20 MEQ/15ML
60 SOLUTION ORAL ONCE
Status: COMPLETED | OUTPATIENT
Start: 2017-10-01 | End: 2017-10-01

## 2017-10-01 RX ADMIN — CEFTRIAXONE 1 G: 1 INJECTION, SOLUTION INTRAVENOUS at 10:10

## 2017-10-01 RX ADMIN — HEPARIN SODIUM 5000 UNITS: 5000 INJECTION, SOLUTION INTRAVENOUS; SUBCUTANEOUS at 09:10

## 2017-10-01 RX ADMIN — LACTULOSE 20 G: 20 SOLUTION ORAL at 05:10

## 2017-10-01 RX ADMIN — FAMOTIDINE 20 MG: 10 INJECTION, SOLUTION INTRAVENOUS at 08:10

## 2017-10-01 RX ADMIN — Medication 3 ML: at 02:10

## 2017-10-01 RX ADMIN — ALBUMIN (HUMAN) 25 G: 12.5 SOLUTION INTRAVENOUS at 05:10

## 2017-10-01 RX ADMIN — FAMOTIDINE 20 MG: 10 INJECTION, SOLUTION INTRAVENOUS at 09:10

## 2017-10-01 RX ADMIN — PIPERACILLIN AND TAZOBACTAM 4.5 G: 4; .5 INJECTION, POWDER, FOR SOLUTION INTRAVENOUS at 01:10

## 2017-10-01 RX ADMIN — POTASSIUM CHLORIDE 60 MEQ: 20 SOLUTION ORAL at 10:10

## 2017-10-01 RX ADMIN — RIFAXIMIN 550 MG: 550 TABLET ORAL at 08:10

## 2017-10-01 RX ADMIN — FUROSEMIDE 40 MG: 10 INJECTION, SOLUTION INTRAVENOUS at 08:10

## 2017-10-01 RX ADMIN — RIFAXIMIN 550 MG: 550 TABLET ORAL at 09:10

## 2017-10-01 RX ADMIN — LACTULOSE 20 G: 20 SOLUTION ORAL at 02:10

## 2017-10-01 RX ADMIN — ALBUMIN (HUMAN) 25 G: 12.5 SOLUTION INTRAVENOUS at 02:10

## 2017-10-01 RX ADMIN — LACTULOSE 20 G: 20 SOLUTION ORAL at 09:10

## 2017-10-01 RX ADMIN — HEPARIN SODIUM 5000 UNITS: 5000 INJECTION, SOLUTION INTRAVENOUS; SUBCUTANEOUS at 08:10

## 2017-10-01 RX ADMIN — PIPERACILLIN AND TAZOBACTAM 4.5 G: 4; .5 INJECTION, POWDER, FOR SOLUTION INTRAVENOUS at 08:10

## 2017-10-01 RX ADMIN — Medication 3 ML: at 09:10

## 2017-10-01 NOTE — ASSESSMENT & PLAN NOTE
Non-Oliguric LAURA on Normal Renal Function  -Baseline SCr ~1.0.  Admitted to OSH with SCr of 1.5 and slowly increased through the hospitalization.  Upon arrival at C, SCr at 2.6.  -His LAURA is not related to HRS as patient is non-oliguric and normotensive.    -LAURA 2/2 ATN, likely multifactorial.     -Toxic ATN from hyperbilirubinemia and bile salt toxicity vs. Ischemic ATN, as patients  mother reported lower BP's while at OSH when receiving narcotics for pain.    -Urine microscopy with numerous muddy brown casts stained with bili, confirming ATN (and by definition ruling out HRS as a primary cause)   -UOP by > 2,600ccs, net negative by > 1000ccs past 24hrs  -hypokalemic on labs, 2/2 diarrhea and diuretics.  -His urine Na was low normal yesterday, would expect to be higher given that patient was on diuretics.  Likely 2/2 decreased renal perfusion from renal vascular congestion from hypervolemia.  -recommend continuing Lasix 40 mg IV QD.    -continue function improving, sCr 1.5 yesterday, labs pending for this morning.  No need for RRT.

## 2017-10-01 NOTE — PLAN OF CARE
Problem: Patient Care Overview  Goal: Plan of Care Review  No acute events throughout night, Lactulose and Albumin continued. VS and assessment per flow sheet, patient progressing towards goals as tolerated, plan of care reviewed with Jhonny Diana and family, all concerns addressed, will continue to monitor.

## 2017-10-01 NOTE — PROGRESS NOTES
"[facility]  Critical Care Medicine  Progress Note    Patient Name: Jhonny Diana  MRN: [id number]  Admission Date: 9/27/2017  Hospital Length of Stay: 4 days  Code Status: Full Code  Attending Provider: Sajan Sandra MD  Primary Care Provider: Provider Notinsystem   Principal Problem: Alcoholic hepatitis without ascites    Subjective:     HPI:  26 yo male with hx of childhood asthma and heavy alcohol abuse (fifth of liquor daily since teenager, last drink 2.5 wks ago per mother) who was transferred to [facility] for higher level of care/liver transplant evaluation. Patient initially presented to OSH for a 2 mo hx of progressively worsening abdominal pain with associated fatigue, jaundice, and abdominal distention with ~20# weight gain. HPI given by patient's mother and OSH records as patient's participation in interview limited by previous administration of pain medication at OSH.     Per mother, patient did not have a fever, chills, dark stools, known sick contacts, nausea, vomiting, diarrhea, confusion, or HA. Reports family hx of alcoholic cirrhosis in father. Pt was recently diagnosed with alcoholic hepatitis during his admission at OSH on 9/6-9/15. During that hospitalization, patient was given ceftriaxone for SBP ppx, prednisone for alcoholic hepatitis, and diuresed. Pt was discharged and instructed to follow up with PCP and AA within 1 wk. After his discharge, patient returned to ED 4 days after for increased weight gain and worsening abdominal pain/jaundice. At OSH, patient was given fluids (for suspected pancreatitis), diuresed and given IV albumin. Two days prior to transfer to [facility], patient had an episode of hematemesis and underwent an EGD on 9/25 that revealed "1 sm varix in distal esophagus, no stigmata of bleeding, gastropathy of portal hypertension, and reflux esophagitis".  No further episodes of hematemesis noted since initial episode.       Recent labs at OSH: Na 134, CO2 18, BUN " 36, Cr 2, Tprot 5.9, albumin 1.9, Tbili 24, alk phos 83, , ALT51. Patient also had lipase 532 and ammonia 118 during his previous hospitalization at OSH. Labwork also notable for negative mitochondrial ab, sm muscle ab, and ceruloplasmin 22. Imaging at OSH revealing abdo U/S (9/20): hepatosplenomegaly, CT abdo w contrast: hepatomegaly with marked fatty infiltration of the liver and mild ascites (9/6). No CDs available for review.          Hospital/ICU Course:  Admitted for decompensated alcoholic hepatitis. Pt started on albumin for HRS tx and lactulose/rifaximin for HE. Mental status improving daily with lactulose.     Interval History/Significant Events: Mentation improving this morning. Per mother, he sang happy birthday to his brother on the phone. AxO to person and place ([facility]). 5 BMs overnight.     Review of Systems   Respiratory: Negative for shortness of breath.    Cardiovascular: Negative for chest pain.   Gastrointestinal: Negative for abdominal pain.     Objective:     Vital Signs (Most Recent):  Temp: 98.6 °F (37 °C) (10/01/17 0300)  Pulse: 110 (10/01/17 0600)  Resp: (!) 34 (10/01/17 0600)  BP: (!) 114/58 (10/01/17 0600)  SpO2: 100 % (10/01/17 0600) Vital Signs (24h Range):  Temp:  [98.4 °F (36.9 °C)-98.8 °F (37.1 °C)] 98.6 °F (37 °C)  Pulse:  [103-116] 110  Resp:  [25-39] 34  SpO2:  [96 %-100 %] 100 %  BP: (103-183)/(49-82) 114/58   Weight: 97.1 kg (214 lb 1.1 oz)  Body mass index is 33.53 kg/m².      Intake/Output Summary (Last 24 hours) at 10/01/17 0609  Last data filed at 10/01/17 0600   Gross per 24 hour   Intake             1265 ml   Output             3332 ml   Net            -2067 ml       Physical Exam   Constitutional: No distress.   HENT:   Mouth/Throat: No oropharyngeal exudate.   NGT present     Eyes: Scleral icterus is present.   Neck: Neck supple.   Cardiovascular: Regular rhythm and normal heart sounds.  Exam reveals no gallop and no friction rub.    No murmur  heard.  tachycardic   Pulmonary/Chest: Effort normal. No respiratory distress. He has no wheezes. He has rales (scattered).   Abdominal: Soft. Bowel sounds are normal. He exhibits distension (improving). There is no tenderness. There is no guarding.   Musculoskeletal: He exhibits edema (2+ abdominal, sacral and +1 bilateral LE pitting edema. ).   Neurological: He is alert.   Follows commands, oriented to person and place this morning.      Skin: Skin is warm and dry. He is not diaphoretic.   icteric       Vents:     Lines/Drains/Airways     Drain                 Urethral Catheter 09/27/17 4 days         NG/OG Tube 09/30/17 1551 [place] sump 14 Fr. Right nostril less than 1 day          Peripheral Intravenous Line                 Peripheral IV - Single Lumen Left Hand -- days         Midline Catheter Insertion/Assessment  - Single Lumen 09/27/17 1800 Right 3 days              Significant Labs:    CBC/Anemia Profile:    Recent Labs  Lab 09/30/17  0300 10/01/17  0435   WBC 18.74* 23.00*   HGB 10.5* 10.5*   HCT 28.8* 30.6*    170   * 111*   RDW 14.9* 15.3*        Chemistries:    Recent Labs  Lab 09/29/17  1616 09/30/17  0300 09/30/17  1356   * 139 139   K 3.3* 3.3* 4.7    109 111*   CO2 15* 16* 14*   BUN 55* 56* 54*   CREATININE 2.1* 2.2* 1.5*   CALCIUM 8.2* 8.4* 8.0*   ALBUMIN  --  2.1*  --    PROT  --  6.0  --    BILITOT  --  28.3*  --    ALKPHOS  --  99  --    ALT  --  57*  --    AST  --  107*  --    MG 2.1 1.9  --    PHOS  --  2.7  --        Significant Imaging:  Imaging Results          X-ray Abdomen for NG Tube Placement (Nursing should notify Radiology after placement) (Final result)  Result time 09/30/17 16:49:19    Final result by Vickey Light MD (09/30/17 16:49:19)                 Impression:     As above      Electronically signed by: VICKEY LIGHT MD  Date:     09/30/17  Time:    16:49              Narrative:    KUB    Results: The NG tube distal tip appears within the  stomach.  The lung bases are clear.  Several movable small bowel are moderately air distended.  The osseous structures appear within normal limits for age.                             US Retroperitoneal Complete (Kidney and (Final result)  Result time 09/28/17 16:55:50    Final result by Karolina Hartman MD (09/28/17 16:55:50)                 Impression:        Findings most consistent with medical renal disease noting slight technical limitations detailed above.  ______________________________________     Electronically signed by resident: CHAN LANGFORD MD  Date:     09/28/17  Time:    16:35            As the supervising and teaching physician, I personally reviewed the images and resident's interpretation and I agree with the findings.          Electronically signed by: KAROLINA HARTMAN MD  Date:     09/28/17  Time:    16:55              Narrative:    ULTRASOUND RETROPERITONEAL COMPLETE    INDICATION: Acute kidney injury     COMPARISON: None.    TECHNIQUE: Transabdominal ultrasonographic images in the longitudinal and transverse axes of the right and left kidneys were obtained. Additionally, spectral doppler images of mid segmental arteries in both kidneys with resistive indices were acquired. Longitudinal and transverse images of the bladder were subsequently obtained.     FINDINGS:     This examination is somewhat limited due to significant artifact overlying left renal fossa with mild obscuration of the left kidney.    The kidneys are normal in size measuring 10.7 cm and the right and 10.3 cm on the left.  Renal morphology appears normal.  No solid mass, nephrolithiasis, or hydronephrosis.  There is decreased perfusion bilaterally and increase renal artery resistive indices measuring 0.85 on the right and 0.78 on the left.    BLADDER:  Collapsed around a Garcia catheter.                             CT Abdomen Pelvis  Without Contrast (Final result)  Result time 09/28/17 15:05:53    Final result by William HEARD  Shalonda Little MD (09/28/17 15:05:53)                 Impression:         Hepatomegaly.      Small volume ascites, mesenteric edema, portal colopathy, anasarca and mild splenomegaly suggesting sequela of portal hypertension.      Patchy confluent groundglass opacities throughout both lower lungs, probably edema, aspiration, or pneumonia.    Prominent loops of small bowel containing fecal material mixed with scattered gas density suggesting slow transit.   ______________________________________     Electronically signed by resident: ROSA JAMIL MD  Date:     09/28/17  Time:    14:12            As the supervising and teaching physician, I personally reviewed the images and resident's interpretation and I agree with the findings.            Electronically signed by: LENORE SINGH MD  Date:     09/28/17  Time:    15:05              Narrative:    Routine CT of the Abdomen and Pelvis without IV contrast      HISTORY:  27 year old M with alcoholic hepatitis     COMPARISON: Ultrasound 09/28/2017     FINDINGS:  Heart: Normal in size. No pericardial effusion.     Lung Bases: There is patchy groundglass opacities throughout both lungs with some air bronchograms.  There is a trace RIGHT pleural effusion.     Liver: The liver is enlarged with homogenous attenuation..  There are no focal hepatic lesions. The assessment of solid organs is limited in the absence of IV contrast.  Patient's arms within the gantry results in beam hardening artifact.      Gallbladder: No calcified gallstones.     Bile Ducts: No evidence of dilated ducts.     Pancreas: Unremarkable.      Spleen: The spleen is upper limit of normal in size.     Adrenals: Normal.     GI Tract/Mesentery: There prominent loops of small bowel containing fecal material mixed with scattered gas density. The transverse colon is distended with gas density.    There is mild wall thickening and pericolonic fat stranding in the RIGHT colon.  There is a suture material near the  ileocecal junction.  No evidence of high-grade bowel obstruction or inflammation. There is a rectal tube in place.    Kidneys/ Ureters: Normal in size and location.No hydronephrosis or nephrolithiasis. No ureteral dilatation.     Bladder: There is a focus of air density within the bladder which contains a Garcia catheter likely related to instrumentation.     Reproductive organs: Unremarkable.     Retroperitoneum:  No significant adenopathy.      Peritoneal Space: There is moderate diffuse mesenteric edema and small volume ascites in the lower abdomen and pelvis. No free air.     Abdominal wall:  There is diffuse body wall edema.     Vasculature: No significant atherosclerosis or aneurysm. There is a small recanalized umbilical vein.    Bones: No acute fracture. Age-appropriate degenerative changes.                             CT Head Without Contrast (Final result)  Result time 09/28/17 13:06:39    Final result by Kendra La MD (09/28/17 13:06:39)                 Impression:        No acute intracranial abnormality definitively seen allowing for streak artifact.        Electronically signed by: KENDRA LA MD, MD  Date:     09/28/17  Time:    13:06              Narrative:    COMPARISON: None    FINDINGS: Patient is rotated and tilted within the scanner. Beam hardening with streak artifact somewhat limits evaluation. The brain appears normally formed without convincing evidence of hemorrhage, mass, midline shift or acute major vascular territory infarct.  Gray-white interface appears intact.  The ventricular system is normal in size for age and demonstrates no distortion by mass effect.      No extra-axial hemorrhage or mass. The extracranial structures are within normal limits.  No displaced calvarial fracture.  Imaged portions of the paranasal sinuses and mastoid air cells are clear. Imaged portions of the orbits are within normal limits.                             X-Ray Chest 1 View (Final result)  Result  time 09/28/17 07:43:17    Final result by Gildardo Davies III, MD (09/28/17 07:43:17)                 Impression:     Pulmonary edema versus pneumonia.      Electronically signed by: GILDARDO DAVIES  Date:     09/28/17  Time:    07:43              Narrative:    One view: There is cardiomegaly, moderate edema.  There is no prior.                             US Abdomen Complete with Dopp_Pre Liver Transplant (Final result)  Result time 09/28/17 04:00:54    Final result by Berhane Wilkins MD (09/28/17 04:00:54)                 Impression:        1.  Cirrhosis and sequelae of hypertension including mild splenomegaly, recanalized umbilical vein and splenorenal collaterals, and trace ascites.    2.  Otherwise satisfactory Doppler evaluation without evidence of portal vein thrombus.    3.  Heterogeneous parenchyma without evidence of mass lesion.    4.  Hepatomegaly.  ______________________________________     Electronically signed by resident: BRANDEN SORTO MD  Date:     09/28/17  Time:    03:51            As the supervising and teaching physician, I personally reviewed the images and resident's interpretation and I agree with the findings.          Electronically signed by: BERHANE WILKINS MD  Date:     09/28/17  Time:    04:00              Narrative:    Indication: Evaluation for liver transplant.    Technique: Abdominal ultrasound with Doppler was performed.    Comparison: None.    Findings:    The pancreas is obscured by midline bowel gas.    The liver is significantly enlarged measuring 22.4 cm.  The liver demonstrates a subtle nodular contour and heterogeneous parenchyma compatible with cirrhosis.  The liver demonstrates diffuse sonographic attenuation compatible with hepatic steatosis.  No focal abnormalities are identified within the liver.    No intra-or extra hepatic biliary ductal dilatation.  The common bile duct measures 5 mm.    The gallbladder is unremarkable.  Sonographic Chang sign is negative.    The  spleen is mildly enlarged measuring 14.0 x 4.8 cm.  No focal abnormalities within the spleen.    The kidneys measure 12.3 and 11.9 cm on the right and left respectively.  No obvious mass or hydronephrosis.  The aorta is obscured by midline bowel gas.    Trace ascites.    Color Doppler images demonstrate appropriate antegrade flow within the hepatic veins, main portal vein, portal venous branches, and IVC.  No portal vein thrombus.  The main hepatic artery is patent without tardus parvus waveform.  Recanalized umbilical vein and mild splenorenal collateral vessels.                                Assessment/Plan:     Psychiatric   Alcoholism /alcohol abuse    - Fifth of whiskey daily since patient was a teenager  - Last drink per mother 2.5 wks ago  - Denies hx of WD  - Monitor for alcohol WD sx  - Addiction psychiatry for alcohol abuse  - Follow up PETH -> in process        Pulmonary   Pneumonia of both lower lobes due to infectious organism    - As seen on CT abdomen.  - stable leukocytosis  - BP stable and patient has remained afebrile, moderately tachycardic in HR 110s.  - on zosyn and flu, random vanc this AM 9                  -isolated WCC at 23 this morning. Suspect uninfectious etiology as she remains afebrile and cultures are negative. Will discuss monitoring off abx.         Renal/   Hypokalemia    - Secondary to diarrhea from lactulose.   - IV replenishment PRN.         Increased anion gap metabolic acidosis    - Suspect 2/2 to acute renal failure  - BHB not elevated.  - Continue daily BMP.           Acute renal failure with tubular necrosis    - Worsening renal function    -Cr at OSH 1.5, Cr on admit 2.6 with elevated BUN  - Renal US demonstrable for medical renal disease.   - Currently with good UOP with 40 mg of lasix IV daily.   - Reportedly with low BP at OSH; DD includes renal vein congestion from compartment syndrome (bladder pressure 33) + ATN  - Continue daily BMP and strict I/O.           GI    * Alcoholic hepatitis without ascites    - Likely due to decompensated alcoholic hepatitis  - Will rule out infectious etiology given rising WCC and organ dysfunction; BCx NGTD   - CT abdomen with small volume ascites, mesenteric edema, and ground glass opacities bilateral lower lungs.    - Broad spectrum abx (vanc/zosyn/flu) to cover for hospital acquired infection (given multiple admission to hospital) and intra-abdominal etiologies, de-escalate abx as indicated   - SBP unlikely considering small volume ascites found on CT- no pocket found on bedside US.     -MELD-Na score: 33 at 9/30/2017  3:00 AM  MELD score: 33 at 9/30/2017  3:00 AM  Calculated from:  Serum Creatinine: 2.2 mg/dL at 9/30/2017  3:00 AM  Serum Sodium: 139 mmol/L (Rounded to 137) at 9/30/2017  3:00 AM  Total Bilirubin: 28.3 mg/dL at 9/30/2017  3:00 AM  INR(ratio): 1.8 at 9/30/2017  3:00 AM  Age: 27 years  - Maddrey's discriminatory function was 48 on admit, will hold off on steroids now as work up for active infection underway.   - Hepatitis panel negative.   - OSH workup: negative jae ab, ceruloplasmin, and sm muscle ab; imaging at OSH notable for hepatosplenomegaly (although no CDs were sent for review)  - Hepatology consulted and will follow this recs; considering high rate of recidivism he is likely not candidate for liver transplant.   - Following hepatology recommendations.         Acute alcoholic pancreatitis    - Mentioned at OSH, lipase not elevated here.         History of hematemesis    - Pt had an episode of hematemesis at OSH   - Underwent EGD that revealed 1 small varix, reflux esophagitis, and gastropathy of portal hypertension  - H/H stable at 10   - Continue to monitor closely for blood loss  - GI prophylaxis with famotidine 20 mg IV BID.         Hepatic encephalopathy    - Ammonia at   - Titrate lactulose for 3-4 BMs daily; good good BM and patient's mentation has moderately improved from yesterday to today.  - continue  rifaximin          Other   Anasarca    - Multifactorial (renal/liver dysfunction); albumin 1.8  - Will diurese and closely monitor labs/urine output   - Lasix IV 40 mg daily; uptitrate as indicated           Critical Care Daily Checklist:    A: Awake: RASS Goal/Actual Goal: RASS Goal: 0-->alert and calm  Actual: Robert Agitation Sedation Scale (RASS): Drowsy   B: Spontaneous Breathing Trial Performed?     C: SAT & SBT Coordinated?  NA                      D: Delirium: CAM-ICU Overall CAM-ICU: Positive   E: Early Mobility Performed? No   F: Feeding Goal:    Status:     Current Diet Order   Procedures    Diet NPO Except for: Sips with Medication     Until abdo ultrasound is completed, then can start on sodium restricted/renal diet with 1L fluid restriction     Order Specific Question:   Except for     Answer:   Sips with Medication      AS: Analgesia/Sedation None   T: Thromboembolic Prophylaxis heparin   H: HOB > 300 yes   U: Stress Ulcer Prophylaxis (if needed) famotidine   G: Glucose Control monitor   B: Bowel Function Stool Occurrence: 1   I: Indwelling Catheter (Lines & Garcia) Necessity Garcia   D: De-escalation of Antimicrobials/Pharmacotherapies Zosyn and flu; will discuss de-escalating abx    Plan for the day/ETD Supportive care    Code Status:  Family/Goals of Care: Full Code         Staff attestation to follow.       TED Tuttle  Critical Care Medicine  [facility]

## 2017-10-01 NOTE — PLAN OF CARE
Problem: Patient Care Overview  Goal: Individualization & Mutuality  Outcome: Ongoing (interventions implemented as appropriate)  No acute events. VSS.  5+ bowel movements.  Orientation fluctuates, remains encephalopathic.  Mom at bedside.  Monitoring labs.  Modesty maintained throughout care.  POC d/w mother by Dr. Sandra.  Will continue to monitor.

## 2017-10-01 NOTE — SUBJECTIVE & OBJECTIVE
Interval History/Significant Events: Mentation improving this morning. Per mother, he sang happy birthday to his brother on the phone. AxO to person and place ([facility]). 5 BMs overnight.     Review of Systems   Respiratory: Negative for shortness of breath.    Cardiovascular: Negative for chest pain.   Gastrointestinal: Negative for abdominal pain.     Objective:     Vital Signs (Most Recent):  Temp: 98.6 °F (37 °C) (10/01/17 0300)  Pulse: 110 (10/01/17 0600)  Resp: (!) 34 (10/01/17 0600)  BP: (!) 114/58 (10/01/17 0600)  SpO2: 100 % (10/01/17 0600) Vital Signs (24h Range):  Temp:  [98.4 °F (36.9 °C)-98.8 °F (37.1 °C)] 98.6 °F (37 °C)  Pulse:  [103-116] 110  Resp:  [25-39] 34  SpO2:  [96 %-100 %] 100 %  BP: (103-183)/(49-82) 114/58   Weight: 97.1 kg (214 lb 1.1 oz)  Body mass index is 33.53 kg/m².      Intake/Output Summary (Last 24 hours) at 10/01/17 0609  Last data filed at 10/01/17 0600   Gross per 24 hour   Intake             1265 ml   Output             3332 ml   Net            -2067 ml       Physical Exam   Constitutional: No distress.   HENT:   Mouth/Throat: No oropharyngeal exudate.   NGT present     Eyes: Scleral icterus is present.   Neck: Neck supple.   Cardiovascular: Regular rhythm and normal heart sounds.  Exam reveals no gallop and no friction rub.    No murmur heard.  tachycardic   Pulmonary/Chest: Effort normal. No respiratory distress. He has no wheezes. He has rales (scattered).   Abdominal: Soft. Bowel sounds are normal. He exhibits distension (improving). There is no tenderness. There is no guarding.   Musculoskeletal: He exhibits edema (2+ abdominal, sacral and +1 bilateral LE pitting edema. ).   Neurological: He is alert.   Follows commands, oriented to person and place this morning.      Skin: Skin is warm and dry. He is not diaphoretic.   icteric       Vents:     Lines/Drains/Airways     Drain                 Urethral Catheter 09/27/17 4 days         NG/OG Tube 09/30/17 1550 Pelon romero 14 Fr.  Right nostril less than 1 day          Peripheral Intravenous Line                 Peripheral IV - Single Lumen Left Hand -- days         Midline Catheter Insertion/Assessment  - Single Lumen 09/27/17 1800 Right 3 days              Significant Labs:    CBC/Anemia Profile:    Recent Labs  Lab 09/30/17  0300 10/01/17  0435   WBC 18.74* 23.00*   HGB 10.5* 10.5*   HCT 28.8* 30.6*    170   * 111*   RDW 14.9* 15.3*        Chemistries:    Recent Labs  Lab 09/29/17  1616 09/30/17  0300 09/30/17  1356   * 139 139   K 3.3* 3.3* 4.7    109 111*   CO2 15* 16* 14*   BUN 55* 56* 54*   CREATININE 2.1* 2.2* 1.5*   CALCIUM 8.2* 8.4* 8.0*   ALBUMIN  --  2.1*  --    PROT  --  6.0  --    BILITOT  --  28.3*  --    ALKPHOS  --  99  --    ALT  --  57*  --    AST  --  107*  --    MG 2.1 1.9  --    PHOS  --  2.7  --        Significant Imaging:  Imaging Results          X-ray Abdomen for NG Tube Placement (Nursing should notify Radiology after placement) (Final result)  Result time 09/30/17 16:49:19    Final result by Vickey Light MD (09/30/17 16:49:19)                 Impression:     As above      Electronically signed by: VICKEY LIGHT MD  Date:     09/30/17  Time:    16:49              Narrative:    KUB    Results: The NG tube distal tip appears within the stomach.  The lung bases are clear.  Several movable small bowel are moderately air distended.  The osseous structures appear within normal limits for age.                             US Retroperitoneal Complete (Kidney and (Final result)  Result time 09/28/17 16:55:50    Final result by Karolina Varner MD (09/28/17 16:55:50)                 Impression:        Findings most consistent with medical renal disease noting slight technical limitations detailed above.  ______________________________________     Electronically signed by resident: CHAN LANGFORD MD  Date:     09/28/17  Time:    16:35            As the supervising and teaching physician,  I personally reviewed the images and resident's interpretation and I agree with the findings.          Electronically signed by: JUANJOSE HARTMAN MD  Date:     09/28/17  Time:    16:55              Narrative:    ULTRASOUND RETROPERITONEAL COMPLETE    INDICATION: Acute kidney injury     COMPARISON: None.    TECHNIQUE: Transabdominal ultrasonographic images in the longitudinal and transverse axes of the right and left kidneys were obtained. Additionally, spectral doppler images of mid segmental arteries in both kidneys with resistive indices were acquired. Longitudinal and transverse images of the bladder were subsequently obtained.     FINDINGS:     This examination is somewhat limited due to significant artifact overlying left renal fossa with mild obscuration of the left kidney.    The kidneys are normal in size measuring 10.7 cm and the right and 10.3 cm on the left.  Renal morphology appears normal.  No solid mass, nephrolithiasis, or hydronephrosis.  There is decreased perfusion bilaterally and increase renal artery resistive indices measuring 0.85 on the right and 0.78 on the left.    BLADDER:  Collapsed around a Garcia catheter.                             CT Abdomen Pelvis  Without Contrast (Final result)  Result time 09/28/17 15:05:53    Final result by William Liz Jr., MD (09/28/17 15:05:53)                 Impression:         Hepatomegaly.      Small volume ascites, mesenteric edema, portal colopathy, anasarca and mild splenomegaly suggesting sequela of portal hypertension.      Patchy confluent groundglass opacities throughout both lower lungs, probably edema, aspiration, or pneumonia.    Prominent loops of small bowel containing fecal material mixed with scattered gas density suggesting slow transit.   ______________________________________     Electronically signed by resident: ROSA JAMIL MD  Date:     09/28/17  Time:    14:12            As the supervising and teaching physician, I personally  reviewed the images and resident's interpretation and I agree with the findings.            Electronically signed by: LENORE SINGH MD  Date:     09/28/17  Time:    15:05              Narrative:    Routine CT of the Abdomen and Pelvis without IV contrast      HISTORY:  27 year old M with alcoholic hepatitis     COMPARISON: Ultrasound 09/28/2017     FINDINGS:  Heart: Normal in size. No pericardial effusion.     Lung Bases: There is patchy groundglass opacities throughout both lungs with some air bronchograms.  There is a trace RIGHT pleural effusion.     Liver: The liver is enlarged with homogenous attenuation..  There are no focal hepatic lesions. The assessment of solid organs is limited in the absence of IV contrast.  Patient's arms within the gantry results in beam hardening artifact.      Gallbladder: No calcified gallstones.     Bile Ducts: No evidence of dilated ducts.     Pancreas: Unremarkable.      Spleen: The spleen is upper limit of normal in size.     Adrenals: Normal.     GI Tract/Mesentery: There prominent loops of small bowel containing fecal material mixed with scattered gas density. The transverse colon is distended with gas density.    There is mild wall thickening and pericolonic fat stranding in the RIGHT colon.  There is a suture material near the ileocecal junction.  No evidence of high-grade bowel obstruction or inflammation. There is a rectal tube in place.    Kidneys/ Ureters: Normal in size and location.No hydronephrosis or nephrolithiasis. No ureteral dilatation.     Bladder: There is a focus of air density within the bladder which contains a Garcia catheter likely related to instrumentation.     Reproductive organs: Unremarkable.     Retroperitoneum:  No significant adenopathy.      Peritoneal Space: There is moderate diffuse mesenteric edema and small volume ascites in the lower abdomen and pelvis. No free air.     Abdominal wall:  There is diffuse body wall edema.     Vasculature: No  significant atherosclerosis or aneurysm. There is a small recanalized umbilical vein.    Bones: No acute fracture. Age-appropriate degenerative changes.                             CT Head Without Contrast (Final result)  Result time 09/28/17 13:06:39    Final result by Kendra Gordon MD (09/28/17 13:06:39)                 Impression:        No acute intracranial abnormality definitively seen allowing for streak artifact.        Electronically signed by: KENDRA GORDON MD, MD  Date:     09/28/17  Time:    13:06              Narrative:    COMPARISON: None    FINDINGS: Patient is rotated and tilted within the scanner. Beam hardening with streak artifact somewhat limits evaluation. The brain appears normally formed without convincing evidence of hemorrhage, mass, midline shift or acute major vascular territory infarct.  Gray-white interface appears intact.  The ventricular system is normal in size for age and demonstrates no distortion by mass effect.      No extra-axial hemorrhage or mass. The extracranial structures are within normal limits.  No displaced calvarial fracture.  Imaged portions of the paranasal sinuses and mastoid air cells are clear. Imaged portions of the orbits are within normal limits.                             X-Ray Chest 1 View (Final result)  Result time 09/28/17 07:43:17    Final result by Gildardo Davies III, MD (09/28/17 07:43:17)                 Impression:     Pulmonary edema versus pneumonia.      Electronically signed by: GILDARDO DAVIES  Date:     09/28/17  Time:    07:43              Narrative:    One view: There is cardiomegaly, moderate edema.  There is no prior.                             US Abdomen Complete with Dopp_Pre Liver Transplant (Final result)  Result time 09/28/17 04:00:54    Final result by Bong Raymond MD (09/28/17 04:00:54)                 Impression:        1.  Cirrhosis and sequelae of hypertension including mild splenomegaly, recanalized umbilical vein and  splenorenal collaterals, and trace ascites.    2.  Otherwise satisfactory Doppler evaluation without evidence of portal vein thrombus.    3.  Heterogeneous parenchyma without evidence of mass lesion.    4.  Hepatomegaly.  ______________________________________     Electronically signed by resident: BRANDEN SORTO MD  Date:     09/28/17  Time:    03:51            As the supervising and teaching physician, I personally reviewed the images and resident's interpretation and I agree with the findings.          Electronically signed by: BERHANE WILKINS MD  Date:     09/28/17  Time:    04:00              Narrative:    Indication: Evaluation for liver transplant.    Technique: Abdominal ultrasound with Doppler was performed.    Comparison: None.    Findings:    The pancreas is obscured by midline bowel gas.    The liver is significantly enlarged measuring 22.4 cm.  The liver demonstrates a subtle nodular contour and heterogeneous parenchyma compatible with cirrhosis.  The liver demonstrates diffuse sonographic attenuation compatible with hepatic steatosis.  No focal abnormalities are identified within the liver.    No intra-or extra hepatic biliary ductal dilatation.  The common bile duct measures 5 mm.    The gallbladder is unremarkable.  Sonographic Chang sign is negative.    The spleen is mildly enlarged measuring 14.0 x 4.8 cm.  No focal abnormalities within the spleen.    The kidneys measure 12.3 and 11.9 cm on the right and left respectively.  No obvious mass or hydronephrosis.  The aorta is obscured by midline bowel gas.    Trace ascites.    Color Doppler images demonstrate appropriate antegrade flow within the hepatic veins, main portal vein, portal venous branches, and IVC.  No portal vein thrombus.  The main hepatic artery is patent without tardus parvus waveform.  Recanalized umbilical vein and mild splenorenal collateral vessels.

## 2017-10-01 NOTE — PLAN OF CARE
Problem: Patient Care Overview  Goal: Plan of Care Review  Recommendations     Recommendation/Intervention:   1. Rec to change TF formula to Isosource 1.5; current formula does not contain fiber; considering pt w/ frequent loose BM's on lactulose, a formula w/ fiber may help. Isosource 1.5 @ a goal rate of 60ml/hr would provide 2160 cals, 97 gms prot, & 1120 mls free fl to meet >80% of EEN/EPN     2. Adv diet to 2gm Na once deemed appropriate/safe for PO intake     Goals: Meet >80% of EEN/EPN  Nutrition Goal Status: new

## 2017-10-01 NOTE — PROGRESS NOTES
[facility]  Adult Nutrition  Progress Note    SUMMARY     Recommendations    Recommendation/Intervention:   1. Rec to change TF formula to Isosource 1.5; current formula does not contain fiber; considering pt w/ frequent loose BM's on lactulose, a formula w/ fiber may help. Isosource 1.5 @ a goal rate of 60ml/hr would provide 2160 cals, 97 gms prot, & 1120 mls free fl to meet >80% of EEN/EPN     2. Adv diet to 2gm Na once deemed appropriate/safe for PO intake     Goals: Meet >80% of EEN/EPN  Nutrition Goal Status: new     Reason for Assessment    Reason for Assessment: new tube feeding  Diagnosis: liver disease (decompensated EtOH hepatitis )  Relevent Medical History: none significant aside from EtOH abuse         General Information Comments: Pt seen this a.m. w/ mom present. Pt on NC. Mom reports pt tiffani'ing TF well, which was initiated last night. Denies questions/concerns    Nutrition Discharge Planning: Unable to determine @ this time    Nutrition Prescription Ordered    Current Diet Order: NPO     Current Nutrition Support Formula Ordered: Other (Comment) (Nutren 1.5)  Current Nutrition Support Rate Ordered: 10 (ml)  Current Nutrition Support Frequency Ordered: ml/hr        Evaluation of Received Nutrients/Fluid Intake    Enteral Calories (kcal): 360  Enteral Protein (gm): 14  Enteral (Free Water) Fluid (mL): 186            Energy Calories Required: not meeting needs  % Kcal Needs: 16              Protein Required: not meeting needs  % Protein Needs: 12     IV Fluid (mL):  (IV abx)           I/O: -1.972L x 24 hrs         Fluid Required: other (see comments) (per MD)  Comments: LBM 10/1; good uop  Tolerance: tolerating  % Intake of Estimated Energy Needs: 0 - 25 %  % Meal Intake: NPO     Nutrition Risk Screen     Nutrition Risk Screen: dysphagia or difficulty swallowing    Nutrition/Diet History    Patient Reported Diet/Restrictions/Preferences: general  Typical Food/Fluid Intake: Adequate  "until a few days pta  Food Preferences: KAYLEE        Factors Affecting Nutritional Intake: NPO, impaired cognitive status/motor control                Labs/Tests/Procedures/Meds       Pertinent Labs Reviewed: reviewed  Pertinent Labs Comments: Cl 111, CO 2 14, BUN 54, Crt 1.5, WBC 23.00, tbili 28.3  Pertinent Medications Reviewed: reviewed  Pertinent Medications Comments: lactulose, famotidine, alb, lasix    Physical Findings    Overall Physical Appearance: on oxygen therapy, edematous  Tubes: nasogastric tube     Skin: jaundice, intact    Anthropometrics    Temp: 98.4 °F (36.9 °C)     Height: 5' 7" (170.2 cm)  Weight Method: Bed Scale  Weight: 97.1 kg (214 lb 1.1 oz)  Ideal Body Weight (IBW), Male: 148 lb     % Ideal Body Weight, Male (lb): 144.64 lb     BMI (Calculated): 33.6  BMI Grade: 30 - 34.9- obesity - grade I              Estimated/Assessed Needs    Weight Used For Calorie Calculations: 97.1 kg (214 lb 1.1 oz)      Energy Calorie Requirements (kcal): 2279  Energy Need Method: [place]-St Jeor (x 1.2 (PAL))     RMR ([place]-St. Jeor Equation): 1899.62        Weight Used For Protein Calculations: 97.1 kg (214 lb 1.1 oz)  Protein Requirements: 117-126 gms (1.2-1.3 gms/kg)     Fluid Need Method: RDA Method (1ml/kcal or per MD)        RDA Method (mL): 2279               Assessment and Plan    Decompensated hepatic cirrhosis    Nutrition Problem  Inadequate energy intake    Related to (etiology):   NPO x 4 days w/ new trickle TF order    Signs and Symptoms (as evidenced by):   <80% of EEN/EPN being met    Interventions/Recommendations (treatment strategy):  See recs    Nutrition Diagnosis Status:   New                  Monitor and Evaluation    Food and Nutrient Intake: energy intake, enteral nutrition intake  Food and Nutrient Adminstration: diet order, enteral and parenteral nutrition administration        Anthropometric Measurements: weight, weight change  Biochemical Data, Medical Tests and Procedures: " electrolyte and renal panel, glucose/endocrine profile  Nutrition-Focused Physical Findings: overall appearance    Nutrition Risk    Level of Risk: other (see comments) (F/u 2 x weekly)    Nutrition Follow-Up    RD Follow-up?: Yes

## 2017-10-01 NOTE — SUBJECTIVE & OBJECTIVE
Interval History: Improving mental status.  No acute events overnight.     Current Facility-Administered Medications   Medication    albumin human 25% bottle 25 g    famotidine (PF) injection 20 mg    furosemide injection 40 mg    heparin (porcine) injection 5,000 Units    lactulose 20 gram/30 mL solution Soln 20 g    ondansetron injection 4 mg    pneumoc 13-arianne conj-dip cr(PF) 0.5 mL    potassium chloride 10% solution 60 mEq    rifAXIMin tablet 550 mg    sodium chloride 0.9% flush 3 mL       Objective:     Vital Signs (Most Recent):  Temp: 98.4 °F (36.9 °C) (10/01/17 0700)  Pulse: (!) 111 (10/01/17 0900)  Resp: (!) 35 (10/01/17 0900)  BP: (!) 120/56 (10/01/17 0900)  SpO2: 99 % (10/01/17 0900) Vital Signs (24h Range):  Temp:  [98.4 °F (36.9 °C)-98.8 °F (37.1 °C)] 98.4 °F (36.9 °C)  Pulse:  [106-116] 111  Resp:  [25-36] 35  SpO2:  [97 %-100 %] 99 %  BP: (103-183)/(49-82) 120/56     Weight: 97.1 kg (214 lb 1.1 oz) (09/27/17 2327)  Body mass index is 33.53 kg/m².    Physical Exam   Constitutional: No distress.   HENT:   Mouth/Throat: No oropharyngeal exudate.   Eyes: Conjunctivae are normal. Scleral icterus is present.   Neck: Neck supple. No tracheal deviation present.   Cardiovascular: Regular rhythm and normal heart sounds.  Exam reveals no gallop and no friction rub.    No murmur heard.  tachycardic   Pulmonary/Chest: Effort normal. No respiratory distress. He has no wheezes. He has rales (scattered).   Audible grunting, occasional productive cough   Abdominal: Soft. Bowel sounds are normal. He exhibits distension. There is tenderness. There is no guarding.   Neurological:   Alert, oriented however still with confusion   Skin: Skin is warm and dry. He is not diaphoretic.   icteric       MELD-Na score: 30 at 10/1/2017  4:35 AM  MELD score: 30 at 10/1/2017  4:35 AM  Calculated from:  Serum Creatinine: 1.5 mg/dL at 9/30/2017  1:56 PM  Serum Sodium: 139 mmol/L (Rounded to 137) at 9/30/2017  1:56 PM  Total  Bilirubin: 28.3 mg/dL at 9/30/2017  3:00 AM  INR(ratio): 1.9 at 10/1/2017  4:35 AM  Age: 27 years    Significant Labs:  CBC:   Recent Labs  Lab 10/01/17  0435 10/01/17  0922   WBC 23.00*  --    RBC 2.76*  --    HGB 10.5*  --    HCT 30.6* 29*     --      CMP:   Recent Labs  Lab 09/30/17  0300 09/30/17  1356   * 97   CALCIUM 8.4* 8.0*   ALBUMIN 2.1*  --    PROT 6.0  --     139   K 3.3* 4.7   CO2 16* 14*    111*   BUN 56* 54*   CREATININE 2.2* 1.5*   ALKPHOS 99  --    ALT 57*  --    *  --    BILITOT 28.3*  --      Coagulation:   Recent Labs  Lab 09/28/17  1727  10/01/17  0435   INR 1.8*  < > 1.9*   APTT 32.1*  --   --    < > = values in this interval not displayed.    Significant Imaging:  Labs: Reviewed  .

## 2017-10-01 NOTE — ASSESSMENT & PLAN NOTE
Patient critically ill with AMS and evidence of liver failure.    Recommendations:  Patient is currently too ill for transplant evaluation at this time.  Addiction psych will re-visit with patient if/when mental status improves: patient's mental status may improve to the point where the reevaluation can occur early this week  Continue Rifaximin  Continue PO lactulose   WBC 23k and more acidotic - consider repeating surveillance cultures, low threshold to restart antibiotics     Continue supportive care - had lengthy conversation with mother: patient will need to significantly improve from a medical standpoint to undergo a transplant evaluation for risk stratification.  His significant ETOH use is of concern and may be a barrier to transplant.  That being said, his young age may allow some measure of recovery from severe alcoholic hepatitis/cirrhosis given continued abstinence.     Please obtain outside records from [facility] in [place] and the [facility].

## 2017-10-01 NOTE — PROGRESS NOTES
[facility]  Nephrology  Progress Note    Patient Name: Jhonny Diana  MRN: [id number]  Admission Date: 9/27/2017  Hospital Length of Stay: 4 days  Attending Provider: Sajan Sandra MD   Primary Care Physician: Provider Notinsystem  Principal Problem:Alcoholic hepatitis without ascites    Subjective:     HPI: Jhonny Diana is a 26 yo male with pMHX of childhood asthma and alcohol abuse, was transferred to [facility] for hepatology evaluation for worsening liver functions.  Information was obtained from family at bedside and review of outside records, given that patient is obtunded.  According to mother at bedside, patient has had multiple admissions to the hospital over the past 6 months for recurrent abdominal pain.  She reports that he has been been seen the ED and has been diagnosed with diverticulitis, UTIs, gastroenteritis, and cholecystitis.  He was never admitted to the hospital and was always discharged.  On 09/06, patient presented to OSH for worsening abdominal pain and distension.  He was diagnosed at that time with ETOH hepatitis.  He was treated with steroids, ceftriaxone for SBP prophylaxis, and diuresed at that time for volume management.  He was discharged home on the 15th, after clearance from GI,a nd was instructed to follow-up with his PCP 1 week after discharge.  He presented to the hospital on 09/25 for worsening abdominal pain and distension with 20 kg weight gain.  He was transferred to [facility] for further care and Nephrology was consulted for LAURA.    On review of outside records, patient Scr on admission was 1.5 (with baseline at 1.0).  According to the family at the bedside, he was not urinating well at the OSH, and not responding to diuretics (spirionolactone/lasix combo).  His mother reports that patient continued to c/o abdominal and inability to urinate, but no chicas was placed until transfer to [facility] yesterday.  Record reviews shows relative hypotension with BPs in the low 90's.   His mother reports that the nursing staff was administrating his narcotic regimen based on his blood pressures, and at times, medication was held due to hypotension.  Since admission to hospital, his UOP has improved, responding well in Lasix 40 IV (770 ml since admission, 700 ml so far this shift at time of consultation).  Admit labs revealed a SCr of 2.6, this morning improved down to 2.4.  Bilirubin remains elevated, >14.  Abdomen remains with tight distension, unable to perform paracentesis, as no significant fluid pocket found.  Mother reports patient did not have para's done at OSH.    Labs on the morning of 09/27 with bicarb of 15.  ABG with alkalemia and primary respiratory alkalosis and anion gap acidosis.     Interval History: alert, but sluggish, following basic commands    Review of patient's allergies indicates:   Allergen Reactions    Bactrim [sulfamethoxazole-trimethoprim] Other (See Comments)     Mookie Trell Syndrome     Current Facility-Administered Medications   Medication Frequency    albumin human 25% bottle 25 g Q8H    cefTRIAXone (ROCEPHIN) 1 g in dextrose 5 % 50 mL IVPB Q24H    famotidine (PF) injection 20 mg BID    furosemide injection 40 mg Daily    heparin (porcine) injection 5,000 Units Q12H    lactulose 20 gram/30 mL solution Soln 20 g TID    ondansetron injection 4 mg Q6H PRN    pneumoc 13-arianne conj-dip cr(PF) 0.5 mL vaccine x 1 dose    rifAXIMin tablet 550 mg BID    sodium chloride 0.9% flush 3 mL Q8H       Objective:     Vital Signs (Most Recent):  Temp: 98.4 °F (36.9 °C) (10/01/17 0700)  Pulse: 109 (10/01/17 1000)  Resp: (!) 29 (10/01/17 1000)  BP: 126/60 (10/01/17 1000)  SpO2: 100 % (10/01/17 1000)  O2 Device (Oxygen Therapy): room air (10/01/17 0300) Vital Signs (24h Range):  Temp:  [98.4 °F (36.9 °C)-98.8 °F (37.1 °C)] 98.4 °F (36.9 °C)  Pulse:  [106-116] 109  Resp:  [25-36] 29  SpO2:  [97 %-100 %] 100 %  BP: (103-183)/(49-73) 126/60     Weight: 97.1 kg (214 lb 1.1 oz)  (10/01/17 1000)  Body mass index is 33.53 kg/m².  Body surface area is 2.14 meters squared.    I/O last 3 completed shifts:  In: 1920 [I.V.:565; NG/GT:255; IV Piggyback:1100]  Out: 4072 [Urine:4070; Stool:2]    Physical Exam   Constitutional: He appears well-developed.   Eyes: Scleral icterus is present.   Neck: No JVD present.   Cardiovascular: Normal rate and regular rhythm.  Exam reveals no friction rub.    Pulmonary/Chest: Effort normal and breath sounds normal.   Abdominal: Soft. He exhibits distension.   Musculoskeletal: He exhibits edema.   Neurological: He is alert.   +asterixis           Assessment/Plan:     Acute renal failure with tubular necrosis    Non-Oliguric LAURA on Normal Renal Function  -Baseline SCr ~1.0.  Admitted to OSH with SCr of 1.5 and slowly increased through the hospitalization.  Upon arrival at [facility], SCr at 2.6.  -His LAURA is not related to HRS as patient is non-oliguric and normotensive.    -LAURA 2/2 ATN, likely multifactorial.     -Toxic ATN from hyperbilirubinemia and bile salt toxicity vs. Ischemic ATN, as patients  mother reported lower BP's while at OSH when receiving narcotics for pain.    -Urine microscopy with numerous muddy brown casts stained with bili, confirming ATN (and by definition ruling out HRS as a primary cause)   -UOP by > 2,600ccs, net negative by > 1000ccs past 24hrs  -hypokalemic on labs, 2/2 diarrhea and diuretics.  -His urine Na was low normal yesterday, would expect to be higher given that patient was on diuretics.  Likely 2/2 decreased renal perfusion from renal vascular congestion from hypervolemia.  -recommend continuing Lasix 40 mg IV QD.    -continue function improving, sCr 1.5 yesterday, labs pending for this morning.  No need for RRT.            Thank you for your consult. I will follow-up with patient. Please contact us if you have any additional questions.    Solis Snyder MD  Nephrology  [facility]

## 2017-10-01 NOTE — ASSESSMENT & PLAN NOTE
Nutrition Problem  Inadequate energy intake    Related to (etiology):   NPO x 4 days w/ new trickle TF order    Signs and Symptoms (as evidenced by):   <80% of EEN/EPN being met    Interventions/Recommendations (treatment strategy):  See recs    Nutrition Diagnosis Status:   New

## 2017-10-01 NOTE — SUBJECTIVE & OBJECTIVE
Interval History: alert, but sluggish, following basic commands    Review of patient's allergies indicates:   Allergen Reactions    Bactrim [sulfamethoxazole-trimethoprim] Other (See Comments)     Mookie Trell Syndrome     Current Facility-Administered Medications   Medication Frequency    albumin human 25% bottle 25 g Q8H    cefTRIAXone (ROCEPHIN) 1 g in dextrose 5 % 50 mL IVPB Q24H    famotidine (PF) injection 20 mg BID    furosemide injection 40 mg Daily    heparin (porcine) injection 5,000 Units Q12H    lactulose 20 gram/30 mL solution Soln 20 g TID    ondansetron injection 4 mg Q6H PRN    pneumoc 13-arianne conj-dip cr(PF) 0.5 mL vaccine x 1 dose    rifAXIMin tablet 550 mg BID    sodium chloride 0.9% flush 3 mL Q8H       Objective:     Vital Signs (Most Recent):  Temp: 98.4 °F (36.9 °C) (10/01/17 0700)  Pulse: 109 (10/01/17 1000)  Resp: (!) 29 (10/01/17 1000)  BP: 126/60 (10/01/17 1000)  SpO2: 100 % (10/01/17 1000)  O2 Device (Oxygen Therapy): room air (10/01/17 0300) Vital Signs (24h Range):  Temp:  [98.4 °F (36.9 °C)-98.8 °F (37.1 °C)] 98.4 °F (36.9 °C)  Pulse:  [106-116] 109  Resp:  [25-36] 29  SpO2:  [97 %-100 %] 100 %  BP: (103-183)/(49-73) 126/60     Weight: 97.1 kg (214 lb 1.1 oz) (10/01/17 1000)  Body mass index is 33.53 kg/m².  Body surface area is 2.14 meters squared.    I/O last 3 completed shifts:  In: 1920 [I.V.:565; NG/GT:255; IV Piggyback:1100]  Out: 4072 [Urine:4070; Stool:2]    Physical Exam   Constitutional: He appears well-developed.   Eyes: Scleral icterus is present.   Neck: No JVD present.   Cardiovascular: Normal rate and regular rhythm.  Exam reveals no friction rub.    Pulmonary/Chest: Effort normal and breath sounds normal.   Abdominal: Soft. He exhibits distension.   Musculoskeletal: He exhibits edema.   Neurological: He is alert.   +asterixis

## 2017-10-01 NOTE — PROGRESS NOTES
"[facility]  Hepatology  Progress Note    Patient Name: Jhonny Diana  MRN: [id number]  Admission Date: 9/27/2017  Hospital Length of Stay: 4 days  Attending Provider: Sajan Sandra MD   Primary Care Physician: Provider Notinsystem  Principal Problem:Alcoholic hepatitis without ascites    Subjective:     Transplant status: No    HPI: 28 yo male with hx of childhood asthma and heavy alcohol abuse (fifth of liquor daily since teenager, last drink 2.5 wks ago per mother) who was transferred to [facility] for higher level of care/liver transplant evaluation.    History obtained per chart review and discussion with mother: Patient initially presented to OSH for a 2 mo hx of progressively worsening abdominal pain with associated fatigue, jaundice, and abdominal distention with ~20# weight gain.     The patient's mother states he began having abdominal pain about 6 months ago.  States he underwent many tests, all of which were inconclusive.  He then began to have abdominal swelling about two months ago and was in&out of the local ER.  He was still actively drinking up until about 2.5 weeks ago according to the mother but she is unsure of this.  At the OSH the patient underwent an EGD which showed g1EV.  He was also diagnosed with "pancreatitis" based on an elevated lipase.  The patient was also placed on prednisone given concerns for alcoholic hepatitis.           Interval History: Improving mental status.  No acute events overnight.     Current Facility-Administered Medications   Medication    albumin human 25% bottle 25 g    famotidine (PF) injection 20 mg    furosemide injection 40 mg    heparin (porcine) injection 5,000 Units    lactulose 20 gram/30 mL solution Soln 20 g    ondansetron injection 4 mg    pneumoc 13-arianne conj-dip cr(PF) 0.5 mL    potassium chloride 10% solution 60 mEq    rifAXIMin tablet 550 mg    sodium chloride 0.9% flush 3 mL       Objective:     Vital Signs (Most " Recent):  Temp: 98.4 °F (36.9 °C) (10/01/17 0700)  Pulse: (!) 111 (10/01/17 0900)  Resp: (!) 35 (10/01/17 0900)  BP: (!) 120/56 (10/01/17 0900)  SpO2: 99 % (10/01/17 0900) Vital Signs (24h Range):  Temp:  [98.4 °F (36.9 °C)-98.8 °F (37.1 °C)] 98.4 °F (36.9 °C)  Pulse:  [106-116] 111  Resp:  [25-36] 35  SpO2:  [97 %-100 %] 99 %  BP: (103-183)/(49-82) 120/56     Weight: 97.1 kg (214 lb 1.1 oz) (09/27/17 2327)  Body mass index is 33.53 kg/m².    Physical Exam   Constitutional: No distress.   HENT:   Mouth/Throat: No oropharyngeal exudate.   Eyes: Conjunctivae are normal. Scleral icterus is present.   Neck: Neck supple. No tracheal deviation present.   Cardiovascular: Regular rhythm and normal heart sounds.  Exam reveals no gallop and no friction rub.    No murmur heard.  tachycardic   Pulmonary/Chest: Effort normal. No respiratory distress. He has no wheezes. He has rales (scattered).   Audible grunting, occasional productive cough   Abdominal: Soft. Bowel sounds are normal. He exhibits distension. There is tenderness. There is no guarding.   Neurological:   Alert, oriented however still with confusion   Skin: Skin is warm and dry. He is not diaphoretic.   icteric       MELD-Na score: 30 at 10/1/2017  4:35 AM  MELD score: 30 at 10/1/2017  4:35 AM  Calculated from:  Serum Creatinine: 1.5 mg/dL at 9/30/2017  1:56 PM  Serum Sodium: 139 mmol/L (Rounded to 137) at 9/30/2017  1:56 PM  Total Bilirubin: 28.3 mg/dL at 9/30/2017  3:00 AM  INR(ratio): 1.9 at 10/1/2017  4:35 AM  Age: 27 years    Significant Labs:  CBC:   Recent Labs  Lab 10/01/17  0435 10/01/17  0922   WBC 23.00*  --    RBC 2.76*  --    HGB 10.5*  --    HCT 30.6* 29*     --      CMP:   Recent Labs  Lab 09/30/17  0300 09/30/17  1356   * 97   CALCIUM 8.4* 8.0*   ALBUMIN 2.1*  --    PROT 6.0  --     139   K 3.3* 4.7   CO2 16* 14*    111*   BUN 56* 54*   CREATININE 2.2* 1.5*   ALKPHOS 99  --    ALT 57*  --    *  --    BILITOT 28.3*  --       Coagulation:   Recent Labs  Lab 09/28/17  1727  10/01/17  0435   INR 1.8*  < > 1.9*   APTT 32.1*  --   --    < > = values in this interval not displayed.    Significant Imaging:  Labs: Reviewed  .    Assessment/Plan:     Decompensated hepatic cirrhosis    Patient critically ill with AMS and evidence of liver failure.    Recommendations:  Patient is currently too ill for transplant evaluation at this time.  Addiction psych will re-visit with patient if/when mental status improves: patient's mental status may improve to the point where the reevaluation can occur early this week  Continue Rifaximin  Continue PO lactulose   WBC 23k and more acidotic - consider repeating surveillance cultures, low threshold to restart antibiotics     Continue supportive care - had lengthy conversation with mother: patient will need to significantly improve from a medical standpoint to undergo a transplant evaluation for risk stratification.  His significant ETOH use is of concern and may be a barrier to transplant.  That being said, his young age may allow some measure of recovery from severe alcoholic hepatitis/cirrhosis given continued abstinence.     Please obtain outside records from [facility] and the [facility].           Acute renal failure with tubular necrosis    Appreciate Nephrology recs            Thank you for your consult. I will follow-up with patient. Please contact us if you have any additional questions.    Bairon Tam   Gastroenterology Fellow PGY VI  [phone]

## 2017-10-02 PROBLEM — E87.6 HYPOKALEMIA: Status: RESOLVED | Noted: 2017-09-29 | Resolved: 2017-10-02

## 2017-10-02 PROBLEM — E87.0 HYPERNATREMIA: Status: ACTIVE | Noted: 2017-10-02

## 2017-10-02 PROBLEM — E87.20 METABOLIC ACIDOSIS WITH NORMAL ANION GAP AND BICARBONATE LOSSES: Status: ACTIVE | Noted: 2017-10-02

## 2017-10-02 LAB
ALBUMIN SERPL BCP-MCNC: 2.7 G/DL
ALP SERPL-CCNC: 113 U/L
ALT SERPL W/O P-5'-P-CCNC: 66 U/L
ANION GAP SERPL CALC-SCNC: 13 MMOL/L
ANION GAP SERPL CALC-SCNC: 14 MMOL/L
ANISOCYTOSIS BLD QL SMEAR: SLIGHT
AST SERPL-CCNC: 151 U/L
BASOPHILS # BLD AUTO: ABNORMAL K/UL
BASOPHILS NFR BLD: 0 %
BILIRUB SERPL-MCNC: 30.2 MG/DL
BUN SERPL-MCNC: 42 MG/DL
BUN SERPL-MCNC: 45 MG/DL
CALCIUM SERPL-MCNC: 8.8 MG/DL
CALCIUM SERPL-MCNC: 9.1 MG/DL
CHLORIDE SERPL-SCNC: 119 MMOL/L
CHLORIDE SERPL-SCNC: 119 MMOL/L
CO2 SERPL-SCNC: 15 MMOL/L
CO2 SERPL-SCNC: 18 MMOL/L
CREAT SERPL-MCNC: 1.1 MG/DL
CREAT SERPL-MCNC: 1.6 MG/DL
DIASTOLIC DYSFUNCTION: NO
DIFFERENTIAL METHOD: ABNORMAL
EOSINOPHIL # BLD AUTO: ABNORMAL K/UL
EOSINOPHIL NFR BLD: 0 %
ERYTHROCYTE [DISTWIDTH] IN BLOOD BY AUTOMATED COUNT: 15.8 %
EST. GFR  (AFRICAN AMERICAN): >60 ML/MIN/1.73 M^2
EST. GFR  (AFRICAN AMERICAN): >60 ML/MIN/1.73 M^2
EST. GFR  (NON AFRICAN AMERICAN): 57.8 ML/MIN/1.73 M^2
EST. GFR  (NON AFRICAN AMERICAN): >60 ML/MIN/1.73 M^2
ESTIMATED PA SYSTOLIC PRESSURE: 29.42
GLOBAL PERICARDIAL EFFUSION: ABNORMAL
GLUCOSE SERPL-MCNC: 115 MG/DL
GLUCOSE SERPL-MCNC: 131 MG/DL
HCT VFR BLD AUTO: 30.4 %
HGB BLD-MCNC: 10.3 G/DL
HYPOCHROMIA BLD QL SMEAR: ABNORMAL
INR PPP: 1.9
LYMPHOCYTES # BLD AUTO: ABNORMAL K/UL
LYMPHOCYTES NFR BLD: 3 %
MAGNESIUM SERPL-MCNC: 2.3 MG/DL
MCH RBC QN AUTO: 37.5 PG
MCHC RBC AUTO-ENTMCNC: 33.9 G/DL
MCV RBC AUTO: 111 FL
METAMYELOCYTES NFR BLD MANUAL: 2 %
MITRAL VALVE MOBILITY: NORMAL
MITRAL VALVE REGURGITATION: ABNORMAL
MONOCYTES # BLD AUTO: ABNORMAL K/UL
MONOCYTES NFR BLD: 6 %
NEUTROPHILS NFR BLD: 88 %
NEUTS BAND NFR BLD MANUAL: 1 %
PHOSPHATE SERPL-MCNC: 2.3 MG/DL
PLATELET # BLD AUTO: 177 K/UL
PLATELET BLD QL SMEAR: ABNORMAL
PMV BLD AUTO: 12.4 FL
POLYCHROMASIA BLD QL SMEAR: ABNORMAL
POTASSIUM SERPL-SCNC: 2.8 MMOL/L
POTASSIUM SERPL-SCNC: 4 MMOL/L
POTASSIUM UR-SCNC: 24 MMOL/L
PROCALCITONIN SERPL IA-MCNC: 1.01 NG/ML
PROT SERPL-MCNC: 6.3 G/DL
PROTHROMBIN TIME: 19.1 SEC
RBC # BLD AUTO: 2.75 M/UL
RETIRED EF AND QEF - SEE NOTES: 60 (ref 55–65)
SODIUM SERPL-SCNC: 148 MMOL/L
SODIUM SERPL-SCNC: 150 MMOL/L
SODIUM UR-SCNC: 65 MMOL/L
WBC # BLD AUTO: 22.37 K/UL

## 2017-10-02 PROCEDURE — 84100 ASSAY OF PHOSPHORUS: CPT

## 2017-10-02 PROCEDURE — 25000003 PHARM REV CODE 250: Performed by: NURSE PRACTITIONER

## 2017-10-02 PROCEDURE — 85610 PROTHROMBIN TIME: CPT

## 2017-10-02 PROCEDURE — 25000003 PHARM REV CODE 250: Performed by: HOSPITALIST

## 2017-10-02 PROCEDURE — 43752 NASAL/OROGASTRIC W/TUBE PLMT: CPT

## 2017-10-02 PROCEDURE — 93306 TTE W/DOPPLER COMPLETE: CPT

## 2017-10-02 PROCEDURE — 63600175 PHARM REV CODE 636 W HCPCS: Performed by: STUDENT IN AN ORGANIZED HEALTH CARE EDUCATION/TRAINING PROGRAM

## 2017-10-02 PROCEDURE — 99232 SBSQ HOSP IP/OBS MODERATE 35: CPT | Mod: ,,, | Performed by: INTERNAL MEDICINE

## 2017-10-02 PROCEDURE — 20000000 HC ICU ROOM

## 2017-10-02 PROCEDURE — 25000003 PHARM REV CODE 250: Performed by: INTERNAL MEDICINE

## 2017-10-02 PROCEDURE — 85007 BL SMEAR W/DIFF WBC COUNT: CPT

## 2017-10-02 PROCEDURE — 63600175 PHARM REV CODE 636 W HCPCS: Performed by: INTERNAL MEDICINE

## 2017-10-02 PROCEDURE — 83735 ASSAY OF MAGNESIUM: CPT

## 2017-10-02 PROCEDURE — 93306 TTE W/DOPPLER COMPLETE: CPT | Mod: 26,,, | Performed by: INTERNAL MEDICINE

## 2017-10-02 PROCEDURE — 63600175 PHARM REV CODE 636 W HCPCS: Performed by: GENERAL ACUTE CARE HOSPITAL

## 2017-10-02 PROCEDURE — 85027 COMPLETE CBC AUTOMATED: CPT

## 2017-10-02 PROCEDURE — A4216 STERILE WATER/SALINE, 10 ML: HCPCS | Performed by: STUDENT IN AN ORGANIZED HEALTH CARE EDUCATION/TRAINING PROGRAM

## 2017-10-02 PROCEDURE — 99232 SBSQ HOSP IP/OBS MODERATE 35: CPT | Mod: ,,, | Performed by: PSYCHIATRY & NEUROLOGY

## 2017-10-02 PROCEDURE — 25000003 PHARM REV CODE 250: Performed by: STUDENT IN AN ORGANIZED HEALTH CARE EDUCATION/TRAINING PROGRAM

## 2017-10-02 PROCEDURE — S0028 INJECTION, FAMOTIDINE, 20 MG: HCPCS | Performed by: STUDENT IN AN ORGANIZED HEALTH CARE EDUCATION/TRAINING PROGRAM

## 2017-10-02 PROCEDURE — 84133 ASSAY OF URINE POTASSIUM: CPT

## 2017-10-02 PROCEDURE — 80048 BASIC METABOLIC PNL TOTAL CA: CPT

## 2017-10-02 PROCEDURE — 99291 CRITICAL CARE FIRST HOUR: CPT | Mod: ,,, | Performed by: NURSE PRACTITIONER

## 2017-10-02 PROCEDURE — 84300 ASSAY OF URINE SODIUM: CPT

## 2017-10-02 PROCEDURE — 80053 COMPREHEN METABOLIC PANEL: CPT

## 2017-10-02 PROCEDURE — 84145 PROCALCITONIN (PCT): CPT

## 2017-10-02 PROCEDURE — 63600175 PHARM REV CODE 636 W HCPCS: Performed by: NURSE PRACTITIONER

## 2017-10-02 RX ORDER — POTASSIUM CHLORIDE 20 MEQ/15ML
40 SOLUTION ORAL ONCE
Status: COMPLETED | OUTPATIENT
Start: 2017-10-02 | End: 2017-10-02

## 2017-10-02 RX ORDER — POTASSIUM CHLORIDE 20 MEQ/15ML
40 SOLUTION ORAL
Status: DISCONTINUED | OUTPATIENT
Start: 2017-10-02 | End: 2017-10-10

## 2017-10-02 RX ORDER — FAMOTIDINE 20 MG/1
20 TABLET, FILM COATED ORAL DAILY
Status: DISCONTINUED | OUTPATIENT
Start: 2017-10-03 | End: 2017-10-03

## 2017-10-02 RX ORDER — POTASSIUM CHLORIDE 20 MEQ/15ML
60 SOLUTION ORAL
Status: DISCONTINUED | OUTPATIENT
Start: 2017-10-02 | End: 2017-10-10

## 2017-10-02 RX ORDER — HEPARIN SODIUM 5000 [USP'U]/ML
5000 INJECTION, SOLUTION INTRAVENOUS; SUBCUTANEOUS EVERY 8 HOURS
Status: DISCONTINUED | OUTPATIENT
Start: 2017-10-02 | End: 2017-10-03

## 2017-10-02 RX ORDER — SODIUM,POTASSIUM PHOSPHATES 280-250MG
2 POWDER IN PACKET (EA) ORAL
Status: DISCONTINUED | OUTPATIENT
Start: 2017-10-02 | End: 2017-10-10

## 2017-10-02 RX ORDER — LACTULOSE 10 G/15ML
15 SOLUTION ORAL 2 TIMES DAILY
Status: DISCONTINUED | OUTPATIENT
Start: 2017-10-02 | End: 2017-10-03

## 2017-10-02 RX ORDER — FUROSEMIDE 10 MG/ML
40 INJECTION INTRAMUSCULAR; INTRAVENOUS DAILY
Status: DISCONTINUED | OUTPATIENT
Start: 2017-10-02 | End: 2017-10-02

## 2017-10-02 RX ORDER — DEXTROSE MONOHYDRATE 50 MG/ML
INJECTION, SOLUTION INTRAVENOUS CONTINUOUS
Status: DISCONTINUED | OUTPATIENT
Start: 2017-10-02 | End: 2017-10-05

## 2017-10-02 RX ADMIN — FUROSEMIDE 40 MG: 10 INJECTION, SOLUTION INTRAVENOUS at 08:10

## 2017-10-02 RX ADMIN — FAMOTIDINE 20 MG: 10 INJECTION, SOLUTION INTRAVENOUS at 08:10

## 2017-10-02 RX ADMIN — HEPARIN SODIUM 5000 UNITS: 5000 INJECTION, SOLUTION INTRAVENOUS; SUBCUTANEOUS at 02:10

## 2017-10-02 RX ADMIN — DEXTROSE MONOHYDRATE: 5 INJECTION, SOLUTION INTRAVENOUS at 09:10

## 2017-10-02 RX ADMIN — POTASSIUM CHLORIDE 40 MEQ: 20 SOLUTION ORAL at 05:10

## 2017-10-02 RX ADMIN — RIFAXIMIN 550 MG: 550 TABLET ORAL at 08:10

## 2017-10-02 RX ADMIN — Medication 3 ML: at 05:10

## 2017-10-02 RX ADMIN — PIPERACILLIN AND TAZOBACTAM 4.5 G: 4; .5 INJECTION, POWDER, FOR SOLUTION INTRAVENOUS at 12:10

## 2017-10-02 RX ADMIN — LACTULOSE 20 G: 20 SOLUTION ORAL at 05:10

## 2017-10-02 RX ADMIN — DEXTROSE MONOHYDRATE: 5 INJECTION, SOLUTION INTRAVENOUS at 08:10

## 2017-10-02 RX ADMIN — PIPERACILLIN AND TAZOBACTAM 4.5 G: 4; .5 INJECTION, POWDER, FOR SOLUTION INTRAVENOUS at 06:10

## 2017-10-02 RX ADMIN — PIPERACILLIN AND TAZOBACTAM 4.5 G: 4; .5 INJECTION, POWDER, FOR SOLUTION INTRAVENOUS at 03:10

## 2017-10-02 RX ADMIN — RIFAXIMIN 550 MG: 550 TABLET ORAL at 09:10

## 2017-10-02 RX ADMIN — HEPARIN SODIUM 5000 UNITS: 5000 INJECTION, SOLUTION INTRAVENOUS; SUBCUTANEOUS at 08:10

## 2017-10-02 RX ADMIN — HEPARIN SODIUM 5000 UNITS: 5000 INJECTION, SOLUTION INTRAVENOUS; SUBCUTANEOUS at 09:10

## 2017-10-02 RX ADMIN — POTASSIUM & SODIUM PHOSPHATES POWDER PACK 280-160-250 MG 2 PACKET: 280-160-250 PACK at 09:10

## 2017-10-02 NOTE — PLAN OF CARE
Problem: Patient Care Overview  Goal: Plan of Care Review  No acute events throughout night,continues to received Lactulose, Albumin completed. Restless- pulling @ medical devices. Bilateral soft wrist restraints utilized. Questional visual hallucinations. VS and assessment per flow sheet, patient progressing towards goals as tolerated, plan of care reviewed with Jhonny Diana and family, all concerns addressed, will continue to monitor.

## 2017-10-02 NOTE — SUBJECTIVE & OBJECTIVE
Interval History:   Per nurse and mom at bedside patient was agitated overnight.  Patient awake but not oriented with waxing mentation.             Unreliable ROS.    Current Facility-Administered Medications   Medication    dextrose 5 % infusion    [START ON 10/3/2017] famotidine tablet 20 mg    furosemide injection 40 mg    heparin (porcine) injection 5,000 Units    lactulose 20 gram/30 mL solution Soln 15 g    ondansetron injection 4 mg    piperacillin-tazobactam 4.5 g in dextrose 5 % 100 mL IVPB (ready to mix system)    pneumoc 13-arianne conj-dip cr(PF) 0.5 mL    potassium chloride 10% solution 40 mEq    potassium chloride 10% solution 40 mEq    potassium chloride 10% solution 60 mEq    potassium, sodium phosphates 280-160-250 mg packet 2 packet    potassium, sodium phosphates 280-160-250 mg packet 2 packet    potassium, sodium phosphates 280-160-250 mg packet 2 packet    rifAXIMin tablet 550 mg       Objective:     Vital Signs (Most Recent):  Temp: 97.8 °F (36.6 °C) (10/02/17 1300)  Pulse: (!) 111 (10/02/17 1415)  Resp: (!) 29 (10/02/17 1415)  BP: (!) 116/56 (10/02/17 1400)  SpO2: 98 % (10/02/17 1415) Vital Signs (24h Range):  Temp:  [97.8 °F (36.6 °C)-98.8 °F (37.1 °C)] 97.8 °F (36.6 °C)  Pulse:  [105-122] 111  Resp:  [22-50] 29  SpO2:  [96 %-100 %] 98 %  BP: (108-169)/() 116/56     Weight: 97.1 kg (214 lb 1.1 oz) (10/01/17 1000)  Body mass index is 33.53 kg/m².    Physical Exam   Constitutional: No distress.   HENT:   Head: Normocephalic.   Eyes: Pupils are equal, round, and reactive to light. Scleral icterus is present.   Neck: Normal range of motion.   Cardiovascular:   Sinus tachycardia   Pulmonary/Chest: Effort normal and breath sounds normal.   Abdominal: He exhibits distension. He exhibits no mass. There is no tenderness. There is no rebound and no guarding. No hernia.   Musculoskeletal: He exhibits edema.   Neurological:   AMS improving but mental status continues to fluctuate   Skin:  He is not diaphoretic.       MELD-Na score: 27 at 10/2/2017  4:56 AM  MELD score: 27 at 10/2/2017  4:56 AM  Calculated from:  Serum Creatinine: 1.1 mg/dL at 10/2/2017  4:56 AM  Serum Sodium: 148 mmol/L (Rounded to 137) at 10/2/2017  4:56 AM  Total Bilirubin: 30.2 mg/dL at 10/2/2017  4:56 AM  INR(ratio): 1.9 at 10/2/2017  3:00 AM  Age: 27 years    Significant Labs:  CBC:   Recent Labs  Lab 10/02/17  0300   WBC 22.37*   RBC 2.75*   HGB 10.3*   HCT 30.4*        CMP:   Recent Labs  Lab 10/02/17  0456   *   CALCIUM 8.8   ALBUMIN 2.7*   PROT 6.3   *   K 4.0   CO2 15*   *   BUN 45*   CREATININE 1.1   ALKPHOS 113   ALT 66*   *   BILITOT 30.2*     Coagulation:   Recent Labs  Lab 09/28/17  1727  10/02/17  0300   INR 1.8*  < > 1.9*   APTT 32.1*  --   --    < > = values in this interval not displayed.    Significant Imaging:  X-Ray: Reviewed

## 2017-10-02 NOTE — PHYSICIAN QUERY
PT Name: Jhonny Diana  MR #: [id number]     Physician Query Form - Documentation Clarification      CDS/: Lita Gamboa RN, CCDS               Contact information: [email or web address]     This form is a permanent document in the medical record.     Query Date: October 2, 2017    By submitting this query, we are merely seeking further clarification of documentation. Please utilize your independent clinical judgment when addressing the question(s) below.    The Medical record reflects the following:    Supporting Clinical Findings Location in Medical Record   Sepsis.  Only source identified was a possible pneumonia. Okay to stop vanc & fluconazole. Will continue with zosyn to complete a 7-day course. This will provide good SBP prophylaxis as well.        Critical Care Medicine progress note 10/01/17                                                                                      Doctor, Please specify diagnosis or diagnoses associated with above clinical findings.  Please specify causative organism of sepsis.    Provider Use Only                                                                                                                               [ x ] Clinically undetermined

## 2017-10-02 NOTE — PLAN OF CARE
Problem: Patient Care Overview  Goal: Plan of Care Review  Outcome: Ongoing (interventions implemented as appropriate)  Plan of care reviewed with patient and mother. D5W currently at 150 ml/hr to decrease sodium levels . Garcia dcd'ed , condom catherter placed. All VSS being moniorted closely.   Goal: Individualization & Mutuality  Outcome: Ongoing (interventions implemented as appropriate)  Past Medical History:   Diagnosis Date    Hypertension

## 2017-10-02 NOTE — ASSESSMENT & PLAN NOTE
--continue empiric zosyn for now.   --still with leukocytosis, however suspect this is due to intra-abdominal process (infectious vs inflammatory). Having no hypoxemia, cough  --repeat CXR today

## 2017-10-02 NOTE — SUBJECTIVE & OBJECTIVE
Interval History/Significant Events: Afebrile, significant stool output. Alert, following commands.     Review of Systems   Constitutional: Negative for fever.        Limited due to encephalopathy   Respiratory: Negative for cough and shortness of breath.    Gastrointestinal: Positive for abdominal distention and abdominal pain. Negative for nausea and vomiting.   Neurological: Negative for headaches.     Objective:     Vital Signs (Most Recent):  Temp: 98.8 °F (37.1 °C) (10/02/17 0300)  Pulse: (!) 112 (10/02/17 0757)  Resp: (!) 27 (10/02/17 0757)  BP: (!) 124/58 (10/02/17 0600)  SpO2: 99 % (10/02/17 0757) Vital Signs (24h Range):  Temp:  [98.2 °F (36.8 °C)-98.8 °F (37.1 °C)] 98.8 °F (37.1 °C)  Pulse:  [105-122] 112  Resp:  [27-35] 27  SpO2:  [96 %-100 %] 99 %  BP: (108-131)/(50-78) 124/58   Weight: 97.1 kg (214 lb 1.1 oz)  Body mass index is 33.53 kg/m².      Intake/Output Summary (Last 24 hours) at 10/02/17 0843  Last data filed at 10/02/17 0600   Gross per 24 hour   Intake              600 ml   Output             2135 ml   Net            -1535 ml       Physical Exam   Constitutional: He appears well-developed. He has a sickly appearance. He appears ill. No distress.   HENT:   Head: Normocephalic and atraumatic.   Eyes: Pupils are equal, round, and reactive to light. Right eye exhibits no discharge. Left eye exhibits no discharge. Scleral icterus is present.   Neck: Neck supple. JVD present. No tracheal deviation present. No thyromegaly present.   Cardiovascular: Regular rhythm, S1 normal and S2 normal.  Tachycardia present.    Pulses:       Radial pulses are 2+ on the right side, and 2+ on the left side.        Dorsalis pedis pulses are 2+ on the right side, and 2+ on the left side.   Pulmonary/Chest: No accessory muscle usage. Tachypnea noted. No respiratory distress. He has no decreased breath sounds. He has no wheezes. He has rales in the right middle field, the right lower field, the left middle field and the  left lower field.   Abdominal: Soft. Bowel sounds are normal. He exhibits distension. There is tenderness. There is no rebound and no guarding.   Lymphadenopathy:     He has no cervical adenopathy.   Neurological: He is alert. GCS eye subscore is 4. GCS verbal subscore is 3. GCS motor subscore is 6.   Alert, oriented to person only. Rambling speech. Following commands and equal strength bilaterally   Skin: Skin is dry. Capillary refill takes less than 2 seconds. He is not diaphoretic. There is pallor.   jaundiced       Vents:     Lines/Drains/Airways     Drain                 Urethral Catheter 09/27/17 5 days         NG/OG Tube 09/30/17 1551 [place] sump 14 Fr. Right nostril 1 day          Peripheral Intravenous Line                 Peripheral IV - Single Lumen Left Hand -- days         Midline Catheter Insertion/Assessment  - Single Lumen 09/27/17 1800 Right 4 days              Significant Labs:    CBC/Anemia Profile:    Recent Labs  Lab 10/01/17  0435 10/01/17  0922 10/02/17  0300   WBC 23.00*  --  22.37*   HGB 10.5*  --  10.3*   HCT 30.6* 29* 30.4*     --  177   *  --  111*   RDW 15.3*  --  15.8*        Chemistries:    Recent Labs  Lab 09/30/17  1356 10/02/17  0456    148*   K 4.7 4.0   * 119*   CO2 14* 15*   BUN 54* 45*   CREATININE 1.5* 1.1   CALCIUM 8.0* 8.8   ALBUMIN  --  2.7*   PROT  --  6.3   BILITOT  --  30.2*   ALKPHOS  --  113   ALT  --  66*   AST  --  151*   MG  --  2.3   PHOS  --  2.3*     Significant Imaging:  I have reviewed and interpreted all pertinent imaging results/findings within the past 24 hours.

## 2017-10-02 NOTE — ASSESSMENT & PLAN NOTE
--reported about a fifth of whiskey daily since teenager  --appreciate psychiatry's and hepatology's assistance  --follow up TOM

## 2017-10-02 NOTE — PROGRESS NOTES
"[facility]  Hepatology  Progress Note    Patient Name: Jhonny Diana  MRN: [id number]  Admission Date: 9/27/2017  Hospital Length of Stay: 5 days  Attending Provider: Sajan Sandra MD   Primary Care Physician: Provider Notinsystem  Principal Problem:Alcoholic hepatitis without ascites    Subjective:     Transplant status: No    HPI: 26 yo male with hx of childhood asthma and heavy alcohol abuse (fifth of liquor daily since teenager, last drink 2.5 wks ago per mother) who was transferred to [facility] for higher level of care/liver transplant evaluation.    History obtained per chart review and discussion with mother: Patient initially presented to OSH for a 2 mo hx of progressively worsening abdominal pain with associated fatigue, jaundice, and abdominal distention with ~20# weight gain.     The patient's mother states he began having abdominal pain about 6 months ago.  States he underwent many tests, all of which were inconclusive.  He then began to have abdominal swelling about two months ago and was in&out of the local ER.  He was still actively drinking up until about 2.5 weeks ago according to the mother but she is unsure of this.  At the OSH the patient underwent an EGD which showed g1EV.  He was also diagnosed with "pancreatitis" based on an elevated lipase.  The patient was also placed on prednisone given concerns for alcoholic hepatitis.           Interval History:   Per nurse and mom at bedside patient was agitated overnight.  Patient awake but not oriented with waxing mentation.             Unreliable ROS.    Current Facility-Administered Medications   Medication    dextrose 5 % infusion    [START ON 10/3/2017] famotidine tablet 20 mg    furosemide injection 40 mg    heparin (porcine) injection 5,000 Units    lactulose 20 gram/30 mL solution Soln 15 g    ondansetron injection 4 mg    piperacillin-tazobactam 4.5 g in dextrose 5 % 100 mL IVPB (ready to mix system)    pneumoc 13-arianne " conj-dip cr(PF) 0.5 mL    potassium chloride 10% solution 40 mEq    potassium chloride 10% solution 40 mEq    potassium chloride 10% solution 60 mEq    potassium, sodium phosphates 280-160-250 mg packet 2 packet    potassium, sodium phosphates 280-160-250 mg packet 2 packet    potassium, sodium phosphates 280-160-250 mg packet 2 packet    rifAXIMin tablet 550 mg       Objective:     Vital Signs (Most Recent):  Temp: 97.8 °F (36.6 °C) (10/02/17 1300)  Pulse: (!) 111 (10/02/17 1415)  Resp: (!) 29 (10/02/17 1415)  BP: (!) 116/56 (10/02/17 1400)  SpO2: 98 % (10/02/17 1415) Vital Signs (24h Range):  Temp:  [97.8 °F (36.6 °C)-98.8 °F (37.1 °C)] 97.8 °F (36.6 °C)  Pulse:  [105-122] 111  Resp:  [22-50] 29  SpO2:  [96 %-100 %] 98 %  BP: (108-169)/() 116/56     Weight: 97.1 kg (214 lb 1.1 oz) (10/01/17 1000)  Body mass index is 33.53 kg/m².    Physical Exam   Constitutional: No distress.   HENT:   Head: Normocephalic.   Eyes: Pupils are equal, round, and reactive to light. Scleral icterus is present.   Neck: Normal range of motion.   Cardiovascular:   Sinus tachycardia   Pulmonary/Chest: Effort normal and breath sounds normal.   Abdominal: He exhibits distension. He exhibits no mass. There is no tenderness. There is no rebound and no guarding. No hernia.   Musculoskeletal: He exhibits edema.   Neurological:   AMS improving but mental status continues to fluctuate   Skin: He is not diaphoretic.       MELD-Na score: 27 at 10/2/2017  4:56 AM  MELD score: 27 at 10/2/2017  4:56 AM  Calculated from:  Serum Creatinine: 1.1 mg/dL at 10/2/2017  4:56 AM  Serum Sodium: 148 mmol/L (Rounded to 137) at 10/2/2017  4:56 AM  Total Bilirubin: 30.2 mg/dL at 10/2/2017  4:56 AM  INR(ratio): 1.9 at 10/2/2017  3:00 AM  Age: 27 years    Significant Labs:  CBC:   Recent Labs  Lab 10/02/17  0300   WBC 22.37*   RBC 2.75*   HGB 10.3*   HCT 30.4*        CMP:   Recent Labs  Lab 10/02/17  0456   *   CALCIUM 8.8   ALBUMIN 2.7*    PROT 6.3   *   K 4.0   CO2 15*   *   BUN 45*   CREATININE 1.1   ALKPHOS 113   ALT 66*   *   BILITOT 30.2*     Coagulation:   Recent Labs  Lab 09/28/17  1727  10/02/17  0300   INR 1.8*  < > 1.9*   APTT 32.1*  --   --    < > = values in this interval not displayed.    Significant Imaging:  X-Ray: Reviewed    Assessment/Plan:     Decompensated hepatic cirrhosis    27 year old male with a history of alcohol abuse transferred from [facility] for higher level of care. On admission his issues were alcoholic hepatitis, possible acute pancreatitis (4/5 BiSAP score, high mortality) MELD-Na score 34, acute renal injury, malnutrition, and debility.    Since his admission his mentation has improved. Furthermore his WBC has mildly increased which could be secondary to alcoholic hepatitis. Yesterday his AG was approximately 19 and there was concern for infection. Today's AG is 17 once corrected for low albumin with WBC still in the 20's and most likely secondary to alcoholic hepatitis.    At this point will continue to monitor for signs of infection as well as monitor his mentation to have addiction psych possibly meet with him. Patient is currently too ill for transplant evaluation at this time.    Plan:  -Continue Lasix IV QDaily  -Continue Lactulose and Rifaximin  -Recommend monitoring for signs of infection at this point with improving acidosis and WBC about the same it is likely secondary to alcoholic hepatitis.  -Recommend Nutrition consult in order to optimize nutrition and improve his outcome(patient is currently only receiving 10 cc of TF per hour)  -Addiction psych will re-visit with patient if/when mental status improves.   -Obtain outside records from [facility] in [place] and the [facility].           Hypernatremia    Worsening hypernatremia this morning, water deficit this morning is 3.1L    Plan:  -Recommend Nutrition consult (refer to above)  -Recommend Albumin TID rather than  5% dextrose to improve hypernatremia  -Recommend continue Lasix at current dose and seeing response to Albumin before making changes  -Since Nephrology is on board will touch base to make sure we are on the same page        Acute renal failure with tubular necrosis    Appreciate Nephrology recs            Thank you for your consult. I will follow-up with patient. Please contact us if you have any additional questions.    Divya Booker M.D.  Gastroenterology Fellow, PGY-IV  Pager: [phone]  [facility]

## 2017-10-02 NOTE — ASSESSMENT & PLAN NOTE
--2/2 diarrhea given lactulose  --will decrease frequency of lactulose as is making significant amount of stool   --continue to monitor

## 2017-10-02 NOTE — ASSESSMENT & PLAN NOTE
Non-Oliguric LAURA on Normal Renal Function  -Baseline SCr ~1.0.  Admitted to OSH with SCr of 1.5 and slowly increased through the hospitalization.  Upon arrival at [facility], SCr at 2.6.  -His LAURA is not related to HRS as patient is non-oliguric and normotensive.    -LAURA 2/2 ATN, likely multifactorial.     -Toxic ATN from hyperbilirubinemia and bile salt toxicity vs. Ischemic ATN, as patients  mother reported lower BP's while at OSH when receiving narcotics for pain.    -Urine microscopy with numerous muddy brown casts stained with bili, confirming ATN (and by definition ruling out HRS as a primary cause)   -His urine Na was low normal,would expect to be higher given that patient was on diuretics.  Likely 2/2 decreased renal perfusion from renal vascular congestion from hypervolemia.  -SCr continues to improve daily, down to 1.1 today.  His edema is significantly improve from Friday.  Having excellent UOP.  -He had 7 BMs yesterday with lactulose.  Had a significant rise in his serum Na level to 148 this morning  -Received his scheduled dose of lasix this morning, would recommend holding further diuretics at this time.

## 2017-10-02 NOTE — ASSESSMENT & PLAN NOTE
2/2 lasix administration and diarrhea from lactulose.  -FWD of 3.3L.    -agree with D5W @ 75 cc/hr.    -recommend discontinuing lasix.  -will obtain urine lytes to determine urine electrolyte free water clearance.

## 2017-10-02 NOTE — ASSESSMENT & PLAN NOTE
--appreciate nephrology's assistance  --suspect LAURA multifactorial: ischemic ATN given hypotension and injury due to hyperbilirubinemia. Felt less likely to be HRS however have continued albumin with lasix  --will discuss whether albumin continuation is necessary at this time  --overall, LAURA improving and making adequate urine  --trend BMP's, intake/output. Avoid nephrotoxic agents  --previous US renal with medical renal disease

## 2017-10-02 NOTE — SUBJECTIVE & OBJECTIVE
"Interval History: Patient visited this morning.  He was awake but not consistently oriented.  Spoke with mother at bedside who reported his status improved over the weekend.  Counseled that patient will need further care from Psychiatry but cannot participate in an evaluation until his mental status has improved.     Psychotherapeutics     None           Review of Systems  Objective:     Vital Signs (Most Recent):  Temp: 98.8 °F (37.1 °C) (10/02/17 0300)  Pulse: (!) 113 (10/02/17 0900)  Resp: (!) 29 (10/02/17 0900)  BP: 139/68 (10/02/17 0900)  SpO2: 99 % (10/02/17 0900) Vital Signs (24h Range):  Temp:  [98.4 °F (36.9 °C)-98.8 °F (37.1 °C)] 98.8 °F (37.1 °C)  Pulse:  [105-122] 113  Resp:  [22-35] 29  SpO2:  [96 %-100 %] 99 %  BP: (108-169)/() 139/68     Height: 5' 7" (170.2 cm)  Weight: 97.1 kg (214 lb 1.1 oz)  Body mass index is 33.53 kg/m².      Intake/Output Summary (Last 24 hours) at 10/02/17 1252  Last data filed at 10/02/17 1100   Gross per 24 hour   Intake              590 ml   Output             2735 ml   Net            -2145 ml       Physical Exam  Mental Status Exam:  Appearance: lying in bed, jaundiced  Behavior/Cooperation: limited/ appropriate eye contact minimal  Speech: appropriate rate, volume and tone increased latency of response, non-spontaneous  Language: uses words appropriately; NO aphasia or dysarthria  Mood: unable to assess  Affect:  congruent with mood and appropriate to situation/content   Thought Process: unable to assess  Thought Content: unable to assess  Level of Consciousness: awake, could not participate in orientation questions  Memory:  Unable to assess  Attention/concentration: impaired  Fund of Knowledge: unable to assess  Insight:  Unable to assess  Judgment: unable to assess       Significant Labs:   Last 24 Hours:   Recent Lab Results       10/02/17  0915 10/02/17  0456 10/02/17  0300      Procalcitonin 1.01  Comment:  A concentration < 0.25 ng/mL represents a low risk " bacterial   infection.  Procalcitonin may not be accurate among patients with localized   infection, recent trauma or major surgery, immunosuppressed state,   invasive fungal infection, renal dysfunction. Decisions regarding   initiation or continuation of antibiotic therapy should not be based   solely on procalcitonin levels.  (H)       Albumin  2.7(L)      Alkaline Phosphatase  113      ALT  66(H)      Anion Gap  14      Aniso   Slight     AST  151(H)      BANDS   1.0     Baso #   CANCELED  Comment:  Result canceled by the ancillary     Basophil%   0.0     Total Bilirubin  30.2  Comment:  For infants and newborns, interpretation of results should be based  on gestational age, weight and in agreement with clinical  observations.  Premature Infant recommended reference ranges:  Up to 24 hours.............<8.0 mg/dL  Up to 48 hours............<12.0 mg/dL  3-5 days..................<15.0 mg/dL  6-29 days.................<15.0 mg/dL  (H)      BUN, Bld  45(H)      Calcium  8.8      Chloride  119(H)      CO2  15(L)      Creatinine  1.1      Differential Method   Manual     eGFR if   >60.0      eGFR if non   >60.0  Comment:  Calculation used to obtain the estimated glomerular filtration  rate (eGFR) is the CKD-EPI equation. Since race is unknown   in our information system, the eGFR values for   -American and Non--American patients are given   for each creatinine result.        Eos #   CANCELED  Comment:  Result canceled by the ancillary     Eosinophil%   0.0     Glucose  115(H)      Gran%   88.0(H)     Hematocrit   30.4(L)     Hemoglobin   10.3(L)     Hypo   Occasional     Coumadin Monitoring INR   1.9  Comment:  Coumadin Therapy:  2.0 - 3.0 for INR for all indicators except mechanical heart valves  and antiphospholipid syndromes which should use 2.5 - 3.5.  (H)     Lymph #   CANCELED  Comment:  Result canceled by the ancillary     Lymph%   3.0(L)     Magnesium  2.3      MCH    37.5(H)     MCHC   33.9     MCV   111(H)     Metamyelocytes   2.0     Mono #   CANCELED  Comment:  Result canceled by the ancillary     Mono%   6.0     MPV   12.4     Phosphorus  2.3(L)      Platelet Estimate   Appears normal     Platelets   177     Poly   Occasional     Potassium  4.0  Comment:  *Moderately Hemolyzed      Total Protein  6.3      Protime   19.1(H)     RBC   2.75(L)     RDW   15.8(H)     Sodium  148(H)      WBC   22.37(H)           Significant Imaging: None

## 2017-10-02 NOTE — ASSESSMENT & PLAN NOTE
--appreciate hepatology's assistance  --continue supportive care for HE, LAURA  --imaging as detailed above.   --continue empiric Abx for possible infectious etiology given leukocytosis  --will assess again for possible Ascitic fluid pocket amendable to paracentesis  --currently not transplant candidate     -MELD-Na score: 27 at 10/2/2017  4:56 AM  MELD score: 27 at 10/2/2017  4:56 AM  Calculated from:  Serum Creatinine: 1.1 mg/dL at 10/2/2017  4:56 AM  Serum Sodium: 148 mmol/L (Rounded to 137) at 10/2/2017  4:56 AM  Total Bilirubin: 30.2 mg/dL at 10/2/2017  4:56 AM  INR(ratio): 1.9 at 10/2/2017  3:00 AM  Age: 27 years   --OSH workup: negative jae ab, ceruloplasmin, and sm muscle ab; hepatitis panel negative

## 2017-10-02 NOTE — ASSESSMENT & PLAN NOTE
Worsening hypernatremia this morning, water deficit this morning is 3.1L    Plan:  -Recommend Nutrition consult (refer to above)  -Recommend Albumin TID rather than 5% dextrose to improve hypernatremia  -Recommend continue Lasix at current dose and seeing response to Albumin before making changes  -Since Nephrology is on board will touch base to make sure we are on the same page

## 2017-10-02 NOTE — SUBJECTIVE & OBJECTIVE
Interval History:   Kidney function continues to improve with improved diuresis.  UOP yesterday of 2.3L/24h, net negative 1.5L.  He had 7x bowel movements yesterday as well with lactulose administration.  He continues to lasix 40 mg IV QD.  Edema significantly improved from Friday.    Review of patient's allergies indicates:   Allergen Reactions    Bactrim [sulfamethoxazole-trimethoprim] Other (See Comments)     Mookie Trell Syndrome     Current Facility-Administered Medications   Medication Frequency    dextrose 5 % infusion Continuous    [START ON 10/3/2017] famotidine tablet 20 mg Daily    furosemide injection 40 mg Daily    heparin (porcine) injection 5,000 Units Q8H    lactulose 20 gram/30 mL solution Soln 15 g BID    ondansetron injection 4 mg Q6H PRN    piperacillin-tazobactam 4.5 g in dextrose 5 % 100 mL IVPB (ready to mix system) Q8H    pneumoc 13-arianne conj-dip cr(PF) 0.5 mL vaccine x 1 dose    potassium chloride 10% solution 40 mEq PRN    potassium chloride 10% solution 40 mEq PRN    potassium chloride 10% solution 60 mEq PRN    potassium, sodium phosphates 280-160-250 mg packet 2 packet PRN    potassium, sodium phosphates 280-160-250 mg packet 2 packet PRN    potassium, sodium phosphates 280-160-250 mg packet 2 packet PRN    rifAXIMin tablet 550 mg BID       Objective:     Vital Signs (Most Recent):  Temp: 98.8 °F (37.1 °C) (10/02/17 0300)  Pulse: (!) 113 (10/02/17 0900)  Resp: (!) 29 (10/02/17 0900)  BP: 139/68 (10/02/17 0900)  SpO2: 99 % (10/02/17 0900)  O2 Device (Oxygen Therapy): room air (10/02/17 0900) Vital Signs (24h Range):  Temp:  [98.4 °F (36.9 °C)-98.8 °F (37.1 °C)] 98.8 °F (37.1 °C)  Pulse:  [105-122] 113  Resp:  [22-35] 29  SpO2:  [96 %-100 %] 99 %  BP: (108-169)/() 139/68     Weight: 97.1 kg (214 lb 1.1 oz) (10/01/17 1000)  Body mass index is 33.53 kg/m².  Body surface area is 2.14 meters squared.    I/O last 3 completed shifts:  In: 1325 [I.V.:325; NG/GT:650; IV  Piggyback:350]  Out: 3252 [Urine:3250; Stool:2]    Physical Exam   Constitutional: He appears well-developed and well-nourished. He appears distressed.   HENT:   Head: Normocephalic and atraumatic.   Eyes: EOM are normal. Scleral icterus is present.   Neck: Normal range of motion. Neck supple.   Cardiovascular: Normal rate and regular rhythm.  Exam reveals no gallop and no friction rub.    No murmur heard.  Pulmonary/Chest: Tachypnea noted. No respiratory distress. He has decreased breath sounds in the right lower field and the left lower field. He has no wheezes. He has no rales.   Abdominal: He exhibits distension and ascites. There is tenderness.   Musculoskeletal: He exhibits edema (+2 sacral edema).   Neurological: He is alert. He is disoriented.   Skin: Skin is warm and dry. No rash noted. He is not diaphoretic. No erythema.       Significant Labs:  CBC:   Recent Labs  Lab 10/02/17  0300   WBC 22.37*   RBC 2.75*   HGB 10.3*   HCT 30.4*      *   MCH 37.5*   MCHC 33.9     CMP:   Recent Labs  Lab 10/02/17  0456   *   CALCIUM 8.8   ALBUMIN 2.7*   PROT 6.3   *   K 4.0   CO2 15*   *   BUN 45*   CREATININE 1.1   ALKPHOS 113   ALT 66*   *   BILITOT 30.2*

## 2017-10-02 NOTE — PHYSICIAN QUERY
"PT Name: Jhonny Diana  MR #: [id number]    Physician Query Form - Pneumonia Clarification     CDS/: Lita Gamboa RN, CCDS               Contact information: [email or web address]    This form is a permanent document in the medical record.    Query Date:  October 2, 2017    By submitting this query, we are merely seeking further clarification of documentation. Please utilize your independent clinical judgment when addressing the question(s) below.    The Medical record contains the following:   Indicators   Supporting Clinical Findings Location in Medical Record   X "Pneumonia" documented Sepsis. Only source identified was a possible pneumonia.  Critical Care Medicine progress note 10/01/17   X Chest X-Ray: Pulmonary edema versus pneumonia. Radiology 09/28/17    PaO2    PaCO2     O2 sat      Cultures      X Treatment  Okay to stop vanc & fluconazole. Will continue with zosyn to complete a 7-day course. Critical Care Medicine progress note 10/01/17    Supplemental O2     X Other CT Abdomen Pelvis  Without Contrast (Final result)   Patchy confluent groundglass opacities throughout both lower lungs, probably edema, aspiration, or pneumonia.    This will provide good SBP prophylaxis as well. Critical Care Medicine progress note 09/29/17        Critical Care Medicine progress note 10/01/17       Provider, please specify type of pneumonia.    [  ] Bacterial Pneumonia (Specify organism): ______________________  [  ] Bacterial, Gram Negative organism Pneumonia  [  ] Fungal Pneumonia (Specify organism): _______________________  [  ] Aspiration Pneumonia  [  ] Other type of pneumonia (please specify): ______________________________________  [ x ] Clinically undetermined    Please document in your progress notes daily for the duration of treatment, until resolved, and include in your discharge summary.    .                                                                                    "

## 2017-10-02 NOTE — ASSESSMENT & PLAN NOTE
--continue lactulose, rifaxamin per NGT. Decrease lactulose to BID   --titrate to 4-5 BM's daily

## 2017-10-02 NOTE — PROGRESS NOTES
"[facility]  Critical Care Medicine  Progress Note    Patient Name: Jhonny Diana  MRN: [id number]  Admission Date: 9/27/2017  Hospital Length of Stay: 5 days  Code Status: Full Code  Attending Provider: Sajan Sandra MD  Primary Care Provider: Provider Notinsystem   Principal Problem: Alcoholic hepatitis without ascites    Subjective:     HPI:  26 yo male with hx of childhood asthma and heavy alcohol abuse (fifth of liquor daily since teenager, last drink 2.5 wks ago per mother) who was transferred to [facility] for higher level of care/liver transplant evaluation. Patient initially presented to OSH for a 2 mo hx of progressively worsening abdominal pain with associated fatigue, jaundice, and abdominal distention with ~20# weight gain. HPI given by patient's mother and OSH records as patient's participation in interview limited by previous administration of pain medication at OSH.     Per mother, patient did not have a fever, chills, dark stools, known sick contacts, nausea, vomiting, diarrhea, confusion, or HA. Reports family hx of alcoholic cirrhosis in father. Pt was recently diagnosed with alcoholic hepatitis during his admission at OSH on 9/6-9/15. During that hospitalization, patient was given ceftriaxone for SBP ppx, prednisone for alcoholic hepatitis, and diuresed. Pt was discharged and instructed to follow up with PCP and AA within 1 wk. After his discharge, patient returned to ED 4 days after for increased weight gain and worsening abdominal pain/jaundice. At OSH, patient was given fluids (for suspected pancreatitis), diuresed and given IV albumin. Two days prior to transfer to [facility], patient had an episode of hematemesis and underwent an EGD on 9/25 that revealed "1 sm varix in distal esophagus, no stigmata of bleeding, gastropathy of portal hypertension, and reflux esophagitis".  No further episodes of hematemesis noted since initial episode.       Recent labs at OSH: Na 134, CO2 18, BUN " 36, Cr 2, Tprot 5.9, albumin 1.9, Tbili 24, alk phos 83, , ALT51. Patient also had lipase 532 and ammonia 118 during his previous hospitalization at OSH. Labwork also notable for negative mitochondrial ab, sm muscle ab, and ceruloplasmin 22. Imaging at OSH revealing abdo U/S (9/20): hepatosplenomegaly, CT abdo w contrast: hepatomegaly with marked fatty infiltration of the liver and mild ascites (9/6). No CDs available for review.        Hospital/ICU Course:  Mr. Diana was admitted for decompensated alcoholic hepatitis with high MELD score, severe HE, and LAURA. Hepatology consulted and following to evaluate liver transplant candidacy. Patient not currently a transplant candidate due to recent alcohol use; recommending re-evaluation by addiction psych and social work when more medically stable and able to participate. Pt started on albumin and lasix for LAURA and possibility for HRS. However nephrology felt that LAURA is likely multifactorial: ATN given hypotensive episodes at OSH and toxic given severe hyperbilirubinemia; not truly HRS. Lactulose/rifaximin started for HE and mental status improving slowly. Mr. Diana has had significant ABD pain since admission with ABD distension. CT abdomen was significant for pericolonic fat stranding of right colon; no obstruction. Also with diffuse mesenteric edema. No significant ascites noted, no cholecystitis/cholelithiasis/ductal dilation. US liver with doppler showed appropriate vasculature.      Interval History/Significant Events: Afebrile, significant stool output. Alert, following commands.     Review of Systems   Constitutional: Negative for fever.        Limited due to encephalopathy   Respiratory: Negative for cough and shortness of breath.    Gastrointestinal: Positive for abdominal distention and abdominal pain. Negative for nausea and vomiting.   Neurological: Negative for headaches.     Objective:     Vital Signs (Most Recent):  Temp: 98.8 °F (37.1 °C) (10/02/17  0300)  Pulse: (!) 112 (10/02/17 0757)  Resp: (!) 27 (10/02/17 0757)  BP: (!) 124/58 (10/02/17 0600)  SpO2: 99 % (10/02/17 0757) Vital Signs (24h Range):  Temp:  [98.2 °F (36.8 °C)-98.8 °F (37.1 °C)] 98.8 °F (37.1 °C)  Pulse:  [105-122] 112  Resp:  [27-35] 27  SpO2:  [96 %-100 %] 99 %  BP: (108-131)/(50-78) 124/58   Weight: 97.1 kg (214 lb 1.1 oz)  Body mass index is 33.53 kg/m².      Intake/Output Summary (Last 24 hours) at 10/02/17 0843  Last data filed at 10/02/17 0600   Gross per 24 hour   Intake              600 ml   Output             2135 ml   Net            -1535 ml       Physical Exam   Constitutional: He appears well-developed. He has a sickly appearance. He appears ill. No distress.   HENT:   Head: Normocephalic and atraumatic.   Eyes: Pupils are equal, round, and reactive to light. Right eye exhibits no discharge. Left eye exhibits no discharge. Scleral icterus is present.   Neck: Neck supple. JVD present. No tracheal deviation present. No thyromegaly present.   Cardiovascular: Regular rhythm, S1 normal and S2 normal.  Tachycardia present.    Pulses:       Radial pulses are 2+ on the right side, and 2+ on the left side.        Dorsalis pedis pulses are 2+ on the right side, and 2+ on the left side.   Pulmonary/Chest: No accessory muscle usage. Tachypnea noted. No respiratory distress. He has no decreased breath sounds. He has no wheezes. He has rales in the right middle field, the right lower field, the left middle field and the left lower field.   Abdominal: Soft. Bowel sounds are normal. He exhibits distension. There is tenderness. There is no rebound and no guarding.   Lymphadenopathy:     He has no cervical adenopathy.   Neurological: He is alert. GCS eye subscore is 4. GCS verbal subscore is 3. GCS motor subscore is 6.   Alert, oriented to person only. Rambling speech. Following commands and equal strength bilaterally   Skin: Skin is dry. Capillary refill takes less than 2 seconds. He is not  diaphoretic. There is pallor.   jaundiced       Vents:     Lines/Drains/Airways     Drain                 Urethral Catheter 09/27/17 5 days         NG/OG Tube 09/30/17 1551 Ruffin sump 14 Fr. Right nostril 1 day          Peripheral Intravenous Line                 Peripheral IV - Single Lumen Left Hand -- days         Midline Catheter Insertion/Assessment  - Single Lumen 09/27/17 1800 Right 4 days              Significant Labs:    CBC/Anemia Profile:    Recent Labs  Lab 10/01/17  0435 10/01/17  0922 10/02/17  0300   WBC 23.00*  --  22.37*   HGB 10.5*  --  10.3*   HCT 30.6* 29* 30.4*     --  177   *  --  111*   RDW 15.3*  --  15.8*        Chemistries:    Recent Labs  Lab 09/30/17  1356 10/02/17  0456    148*   K 4.7 4.0   * 119*   CO2 14* 15*   BUN 54* 45*   CREATININE 1.5* 1.1   CALCIUM 8.0* 8.8   ALBUMIN  --  2.7*   PROT  --  6.3   BILITOT  --  30.2*   ALKPHOS  --  113   ALT  --  66*   AST  --  151*   MG  --  2.3   PHOS  --  2.3*     Significant Imaging:  I have reviewed and interpreted all pertinent imaging results/findings within the past 24 hours.    Assessment/Plan:     Psychiatric   Alcoholism /alcohol abuse    --reported about a fifth of whiskey daily since teenager  --appreciate psychiatry's and hepatology's assistance  --follow up PET        Pulmonary   Pneumonia of both lower lobes due to infectious organism    --continue empiric zosyn for now.   --still with leukocytosis, however suspect this is due to intra-abdominal process (infectious vs inflammatory). Having no hypoxemia, cough  --repeat CXR today        Renal/   Hypernatremia    --2/2 free water losses in stool and diuresis  --start D5W infusion today, follow up BMP this afternoon to ensure correcting hypernatremia, but not too quickly   --continue to monitor        Metabolic acidosis with normal anion gap and bicarbonate losses    --2/2 diarrhea given lactulose  --will decrease frequency of lactulose as is making  significant amount of stool   --continue to monitor         Increased anion gap metabolic acidosis    --corrected AG 18; continue to monitor  --suspect 2/2 uremia. Previous lactate normal    --also with non anion gap acidosis due to bicarbonate losses from GI tract. See below             Acute renal failure with tubular necrosis    --appreciate nephrology's assistance  --suspect LAURA multifactorial: ischemic ATN given hypotension and injury due to hyperbilirubinemia. Felt less likely to be HRS however have continued albumin with lasix  --will discuss whether albumin continuation is necessary at this time  --overall, LAURA improving and making adequate urine  --trend BMP's, intake/output. Avoid nephrotoxic agents  --previous US renal with medical renal disease           GI   * Alcoholic hepatitis without ascites    --appreciate hepatology's assistance  --continue supportive care for HE, LAURA  --imaging as detailed above.   --continue empiric Abx for possible infectious etiology given leukocytosis  --will assess again for possible Ascitic fluid pocket amendable to paracentesis  --currently not transplant candidate     -MELD-Na score: 27 at 10/2/2017  4:56 AM  MELD score: 27 at 10/2/2017  4:56 AM  Calculated from:  Serum Creatinine: 1.1 mg/dL at 10/2/2017  4:56 AM  Serum Sodium: 148 mmol/L (Rounded to 137) at 10/2/2017  4:56 AM  Total Bilirubin: 30.2 mg/dL at 10/2/2017  4:56 AM  INR(ratio): 1.9 at 10/2/2017  3:00 AM  Age: 27 years   --OSH workup: negative jae ab, ceruloplasmin, and sm muscle ab; hepatitis panel negative         History of hematemesis    --Pt had an episode of hematemesis at OSH and EGD that revealed 1 small varix, reflux esophagitis, and gastropathy of portal hypertension  --no evidence of bleeding currently.  --trend Hgbs        Acute alcoholic pancreatitis    - Mentioned at OSH, lipase not elevated here.         Decompensated hepatic cirrhosis      --see above        Hepatic encephalopathy    --continue  lactulose, rifaxamin per NGT. Decrease lactulose to BID   --titrate to 4-5 BM's daily           Other   Anasarca    --2/2 liver disease and hypoalbuminemia  --plan as above            Critical Care Time: 50 minutes  Critical secondary to Patient has a condition that poses threat to life and bodily function: hepatic encephalopathy and decompensated liver failure      Critical care was time spent personally by me on the following activities: development of treatment plan with patient or surrogate and bedside caregivers, discussions with consultants, evaluation of patient's response to treatment, examination of patient, ordering and performing treatments and interventions, ordering and review of laboratory studies, ordering and review of radiographic studies, pulse oximetry, re-evaluation of patient's condition. This critical care time did not overlap with that of any other provider or involve time for any procedures.     Griselda Falcon NP  Critical Care Medicine  [facility]

## 2017-10-02 NOTE — PROGRESS NOTES
[facility]  Nephrology  Progress Note    Patient Name: Jhonny Diana  MRN: [id number]  Admission Date: 9/27/2017  Hospital Length of Stay: 5 days  Attending Provider: Sajan Sandra MD   Primary Care Physician: Provider Notinsystem  Principal Problem:Alcoholic hepatitis without ascites    Subjective:     HPI: Jhonny Diana is a 26 yo male with pMHX of childhood asthma and alcohol abuse, was transferred to [facility] for hepatology evaluation for worsening liver functions.  Information was obtained from family at bedside and review of outside records, given that patient is obtunded.  According to mother at bedside, patient has had multiple admissions to the hospital over the past 6 months for recurrent abdominal pain.  She reports that he has been been seen the ED and has been diagnosed with diverticulitis, UTIs, gastroenteritis, and cholecystitis.  He was never admitted to the hospital and was always discharged.  On 09/06, patient presented to OSH for worsening abdominal pain and distension.  He was diagnosed at that time with ETOH hepatitis.  He was treated with steroids, ceftriaxone for SBP prophylaxis, and diuresed at that time for volume management.  He was discharged home on the 15th, after clearance from GI,a nd was instructed to follow-up with his PCP 1 week after discharge.  He presented to the hospital on 09/25 for worsening abdominal pain and distension with 20 kg weight gain.  He was transferred to [facility] for further care and Nephrology was consulted for LAURA.    On review of outside records, patient Scr on admission was 1.5 (with baseline at 1.0).  According to the family at the bedside, he was not urinating well at the OSH, and not responding to diuretics (spirionolactone/lasix combo).  His mother reports that patient continued to c/o abdominal and inability to urinate, but no chicas was placed until transfer to [facility] yesterday.  Record reviews shows relative hypotension with BPs in the low 90's.   His mother reports that the nursing staff was administrating his narcotic regimen based on his blood pressures, and at times, medication was held due to hypotension.  Since admission to hospital, his UOP has improved, responding well in Lasix 40 IV (770 ml since admission, 700 ml so far this shift at time of consultation).  Admit labs revealed a SCr of 2.6, this morning improved down to 2.4.  Bilirubin remains elevated, >14.  Abdomen remains with tight distension, unable to perform paracentesis, as no significant fluid pocket found.  Mother reports patient did not have para's done at OSH.    Labs on the morning of 09/27 with bicarb of 15.  ABG with alkalemia and primary respiratory alkalosis and anion gap acidosis.     Interval History:   Kidney function continues to improve with improved diuresis.  UOP yesterday of 2.3L/24h, net negative 1.5L.  He had 7x bowel movements yesterday as well with lactulose administration.  He continues to lasix 40 mg IV QD.  Edema significantly improved from Friday.    Review of patient's allergies indicates:   Allergen Reactions    Bactrim [sulfamethoxazole-trimethoprim] Other (See Comments)     Mookie Trell Syndrome     Current Facility-Administered Medications   Medication Frequency    dextrose 5 % infusion Continuous    [START ON 10/3/2017] famotidine tablet 20 mg Daily    furosemide injection 40 mg Daily    heparin (porcine) injection 5,000 Units Q8H    lactulose 20 gram/30 mL solution Soln 15 g BID    ondansetron injection 4 mg Q6H PRN    piperacillin-tazobactam 4.5 g in dextrose 5 % 100 mL IVPB (ready to mix system) Q8H    pneumoc 13-arianne conj-dip cr(PF) 0.5 mL vaccine x 1 dose    potassium chloride 10% solution 40 mEq PRN    potassium chloride 10% solution 40 mEq PRN    potassium chloride 10% solution 60 mEq PRN    potassium, sodium phosphates 280-160-250 mg packet 2 packet PRN    potassium, sodium phosphates 280-160-250 mg packet 2 packet PRN    potassium,  sodium phosphates 280-160-250 mg packet 2 packet PRN    rifAXIMin tablet 550 mg BID       Objective:     Vital Signs (Most Recent):  Temp: 98.8 °F (37.1 °C) (10/02/17 0300)  Pulse: (!) 113 (10/02/17 0900)  Resp: (!) 29 (10/02/17 0900)  BP: 139/68 (10/02/17 0900)  SpO2: 99 % (10/02/17 0900)  O2 Device (Oxygen Therapy): room air (10/02/17 0900) Vital Signs (24h Range):  Temp:  [98.4 °F (36.9 °C)-98.8 °F (37.1 °C)] 98.8 °F (37.1 °C)  Pulse:  [105-122] 113  Resp:  [22-35] 29  SpO2:  [96 %-100 %] 99 %  BP: (108-169)/() 139/68     Weight: 97.1 kg (214 lb 1.1 oz) (10/01/17 1000)  Body mass index is 33.53 kg/m².  Body surface area is 2.14 meters squared.    I/O last 3 completed shifts:  In: 1325 [I.V.:325; NG/GT:650; IV Piggyback:350]  Out: 3252 [Urine:3250; Stool:2]    Physical Exam   Constitutional: He appears well-developed and well-nourished. He appears distressed.   HENT:   Head: Normocephalic and atraumatic.   Eyes: EOM are normal. Scleral icterus is present.   Neck: Normal range of motion. Neck supple.   Cardiovascular: Normal rate and regular rhythm.  Exam reveals no gallop and no friction rub.    No murmur heard.  Pulmonary/Chest: Tachypnea noted. No respiratory distress. He has decreased breath sounds in the right lower field and the left lower field. He has no wheezes. He has no rales.   Abdominal: He exhibits distension and ascites. There is tenderness.   Musculoskeletal: He exhibits edema (+2 sacral edema).   Neurological: He is alert. He is disoriented.   Skin: Skin is warm and dry. No rash noted. He is not diaphoretic. No erythema.       Significant Labs:  CBC:   Recent Labs  Lab 10/02/17  0300   WBC 22.37*   RBC 2.75*   HGB 10.3*   HCT 30.4*      *   MCH 37.5*   MCHC 33.9     CMP:   Recent Labs  Lab 10/02/17  0456   *   CALCIUM 8.8   ALBUMIN 2.7*   PROT 6.3   *   K 4.0   CO2 15*   *   BUN 45*   CREATININE 1.1   ALKPHOS 113   ALT 66*   *   BILITOT 30.2*             Assessment/Plan:     Acute renal failure with tubular necrosis    Non-Oliguric LAURA on Normal Renal Function  -Baseline SCr ~1.0.  Admitted to OSH with SCr of 1.5 and slowly increased through the hospitalization.  Upon arrival at [facility], SCr at 2.6.  -His LAURA is not related to HRS as patient is non-oliguric and normotensive.    -LAURA 2/2 ATN, likely multifactorial.     -Toxic ATN from hyperbilirubinemia and bile salt toxicity vs. Ischemic ATN, as patients  mother reported lower BP's while at OSH when receiving narcotics for pain.    -Urine microscopy with numerous muddy brown casts stained with bili, confirming ATN (and by definition ruling out HRS as a primary cause)   -His urine Na was low normal,would expect to be higher given that patient was on diuretics.  Likely 2/2 decreased renal perfusion from renal vascular congestion from hypervolemia.  -SCr continues to improve daily, down to 1.1 today.  His edema is significantly improve from Friday.  Having excellent UOP.  -He had 7 BMs yesterday with lactulose.  Had a significant rise in his serum Na level to 148 this morning  -Received his scheduled dose of lasix this morning, would recommend holding further diuretics at this time.        Hypernatremia    2/2 lasix administration and diarrhea from lactulose.  -FWD of 3.3L.    -agree with D5W @ 75 cc/hr.    -recommend discontinuing lasix.  -will obtain urine lytes to determine urine electrolyte free water clearance.          Derrick Love NP  Nephrology  [facility]  Pager:  [phone]

## 2017-10-02 NOTE — ASSESSMENT & PLAN NOTE
--2/2 free water losses in stool and diuresis  --start D5W infusion today, follow up BMP this afternoon to ensure correcting hypernatremia, but not too quickly   --continue to monitor

## 2017-10-02 NOTE — ASSESSMENT & PLAN NOTE
27 year old male with a history of alcohol abuse transferred from [facility] for higher level of care. On admission his issues were alcoholic hepatitis, possible acute pancreatitis (4/5 BiSAP score, high mortality) MELD-Na score 34, acute renal injury, malnutrition, and debility.    Since his admission his mentation has improved. Furthermore his WBC has mildly increased which could be secondary to alcoholic hepatitis. Yesterday his AG was approximately 19 and there was concern for infection. Today's AG is 17 once corrected for low albumin with WBC still in the 20's and most likely secondary to alcoholic hepatitis.    At this point will continue to monitor for signs of infection as well as monitor his mentation to have addiction psych possibly meet with him. Patient is currently too ill for transplant evaluation at this time.    Plan:  -Continue Lasix IV QDaily  -Continue Lactulose and Rifaximin  -Recommend monitoring for signs of infection at this point with improving acidosis and WBC about the same it is likely secondary to alcoholic hepatitis.  -Recommend Nutrition consult in order to optimize nutrition and improve his outcome(patient is currently only receiving 10 cc of TF per hour)  -Addiction psych will re-visit with patient if/when mental status improves.   -Obtain outside records from [facility] and the [facility].

## 2017-10-02 NOTE — ASSESSMENT & PLAN NOTE
--Pt had an episode of hematemesis at OSH and EGD that revealed 1 small varix, reflux esophagitis, and gastropathy of portal hypertension  --no evidence of bleeding currently.  --trend Hgbs

## 2017-10-02 NOTE — ASSESSMENT & PLAN NOTE
--corrected AG 18; continue to monitor  --suspect 2/2 uremia. Previous lactate normal    --also with non anion gap acidosis due to bicarbonate losses from GI tract. See below

## 2017-10-03 ENCOUNTER — CONFERENCE (OUTPATIENT)
Dept: TRANSPLANT | Facility: CLINIC | Age: 27
End: 2017-10-03

## 2017-10-03 PROBLEM — D68.9 COAGULOPATHY: Status: ACTIVE | Noted: 2017-10-03

## 2017-10-03 PROBLEM — D72.829 LEUKOCYTOSIS: Status: ACTIVE | Noted: 2017-10-03

## 2017-10-03 LAB
ALBUMIN SERPL BCP-MCNC: 2.4 G/DL
ALP SERPL-CCNC: 129 U/L
ALT SERPL W/O P-5'-P-CCNC: 69 U/L
ANION GAP SERPL CALC-SCNC: 15 MMOL/L
ANISOCYTOSIS BLD QL SMEAR: SLIGHT
AST SERPL-CCNC: 148 U/L
B-OH-BUTYR BLD STRIP-SCNC: 0.1 MMOL/L
BACTERIA BLD CULT: NORMAL
BACTERIA BLD CULT: NORMAL
BASOPHILS # BLD AUTO: ABNORMAL K/UL
BASOPHILS NFR BLD: 0 %
BILIRUB DIRECT SERPL-MCNC: >14 MG/DL
BILIRUB SERPL-MCNC: 29.5 MG/DL
BUN SERPL-MCNC: 36 MG/DL
BURR CELLS BLD QL SMEAR: ABNORMAL
C DIFF GDH STL QL: NEGATIVE
C DIFF TOX A+B STL QL IA: NEGATIVE
CALCIUM SERPL-MCNC: 8.5 MG/DL
CHLORIDE SERPL-SCNC: 118 MMOL/L
CO2 SERPL-SCNC: 13 MMOL/L
CREAT SERPL-MCNC: 0.9 MG/DL
DIFFERENTIAL METHOD: ABNORMAL
EOSINOPHIL # BLD AUTO: ABNORMAL K/UL
EOSINOPHIL NFR BLD: 0 %
ERYTHROCYTE [DISTWIDTH] IN BLOOD BY AUTOMATED COUNT: 15.3 %
EST. GFR  (AFRICAN AMERICAN): >60 ML/MIN/1.73 M^2
EST. GFR  (NON AFRICAN AMERICAN): >60 ML/MIN/1.73 M^2
GLUCOSE SERPL-MCNC: 141 MG/DL
HCT VFR BLD AUTO: 30.4 %
HGB BLD-MCNC: 10.4 G/DL
INR PPP: 1.8
LACTATE SERPL-SCNC: 2.3 MMOL/L
LYMPHOCYTES # BLD AUTO: ABNORMAL K/UL
LYMPHOCYTES NFR BLD: 6 %
MAGNESIUM SERPL-MCNC: 2.2 MG/DL
MCH RBC QN AUTO: 38.2 PG
MCHC RBC AUTO-ENTMCNC: 34.2 G/DL
MCV RBC AUTO: 112 FL
MONOCYTES # BLD AUTO: ABNORMAL K/UL
MONOCYTES NFR BLD: 4 %
MYELOCYTES NFR BLD MANUAL: 1 %
NEUTROPHILS NFR BLD: 89 %
NRBC BLD-RTO: ABNORMAL /100 WBC
PHOSPHATE SERPL-MCNC: 2.4 MG/DL
PLATELET # BLD AUTO: 161 K/UL
PLATELET BLD QL SMEAR: ABNORMAL
PMV BLD AUTO: 11.3 FL
POTASSIUM SERPL-SCNC: 3 MMOL/L
POTASSIUM SERPL-SCNC: 4.5 MMOL/L
PROCALCITONIN SERPL IA-MCNC: 0.87 NG/ML
PROT SERPL-MCNC: 6.3 G/DL
PROTHROMBIN TIME: 18.4 SEC
RBC # BLD AUTO: 2.72 M/UL
SODIUM SERPL-SCNC: 146 MMOL/L
WBC # BLD AUTO: 24.04 K/UL

## 2017-10-03 PROCEDURE — 99233 SBSQ HOSP IP/OBS HIGH 50: CPT | Mod: ,,, | Performed by: NURSE PRACTITIONER

## 2017-10-03 PROCEDURE — 97530 THERAPEUTIC ACTIVITIES: CPT

## 2017-10-03 PROCEDURE — 25000003 PHARM REV CODE 250: Performed by: INTERNAL MEDICINE

## 2017-10-03 PROCEDURE — 25000003 PHARM REV CODE 250: Performed by: NURSE PRACTITIONER

## 2017-10-03 PROCEDURE — 85610 PROTHROMBIN TIME: CPT

## 2017-10-03 PROCEDURE — 85027 COMPLETE CBC AUTOMATED: CPT

## 2017-10-03 PROCEDURE — 94761 N-INVAS EAR/PLS OXIMETRY MLT: CPT

## 2017-10-03 PROCEDURE — 84100 ASSAY OF PHOSPHORUS: CPT

## 2017-10-03 PROCEDURE — 84132 ASSAY OF SERUM POTASSIUM: CPT

## 2017-10-03 PROCEDURE — 97112 NEUROMUSCULAR REEDUCATION: CPT

## 2017-10-03 PROCEDURE — 87040 BLOOD CULTURE FOR BACTERIA: CPT

## 2017-10-03 PROCEDURE — 82010 KETONE BODYS QUAN: CPT

## 2017-10-03 PROCEDURE — 20000000 HC ICU ROOM

## 2017-10-03 PROCEDURE — 84145 PROCALCITONIN (PCT): CPT

## 2017-10-03 PROCEDURE — 63600175 PHARM REV CODE 636 W HCPCS: Performed by: NURSE PRACTITIONER

## 2017-10-03 PROCEDURE — 99233 SBSQ HOSP IP/OBS HIGH 50: CPT | Mod: ,,, | Performed by: INTERNAL MEDICINE

## 2017-10-03 PROCEDURE — 97163 PT EVAL HIGH COMPLEX 45 MIN: CPT

## 2017-10-03 PROCEDURE — 85007 BL SMEAR W/DIFF WBC COUNT: CPT

## 2017-10-03 PROCEDURE — 63600175 PHARM REV CODE 636 W HCPCS: Performed by: GENERAL ACUTE CARE HOSPITAL

## 2017-10-03 PROCEDURE — 80076 HEPATIC FUNCTION PANEL: CPT

## 2017-10-03 PROCEDURE — 99232 SBSQ HOSP IP/OBS MODERATE 35: CPT | Mod: ,,, | Performed by: INTERNAL MEDICINE

## 2017-10-03 PROCEDURE — 97535 SELF CARE MNGMENT TRAINING: CPT

## 2017-10-03 PROCEDURE — 83605 ASSAY OF LACTIC ACID: CPT

## 2017-10-03 PROCEDURE — 83735 ASSAY OF MAGNESIUM: CPT

## 2017-10-03 PROCEDURE — 97167 OT EVAL HIGH COMPLEX 60 MIN: CPT

## 2017-10-03 PROCEDURE — 80048 BASIC METABOLIC PNL TOTAL CA: CPT

## 2017-10-03 PROCEDURE — 87449 NOS EACH ORGANISM AG IA: CPT

## 2017-10-03 RX ORDER — FAMOTIDINE 20 MG/1
20 TABLET, FILM COATED ORAL 2 TIMES DAILY
Status: DISCONTINUED | OUTPATIENT
Start: 2017-10-03 | End: 2017-10-09

## 2017-10-03 RX ORDER — LACTULOSE 10 G/15ML
15 SOLUTION ORAL DAILY
Status: DISCONTINUED | OUTPATIENT
Start: 2017-10-04 | End: 2017-10-05

## 2017-10-03 RX ORDER — HEPARIN SODIUM 5000 [USP'U]/ML
5000 INJECTION, SOLUTION INTRAVENOUS; SUBCUTANEOUS EVERY 8 HOURS
Status: DISCONTINUED | OUTPATIENT
Start: 2017-10-03 | End: 2017-10-14

## 2017-10-03 RX ADMIN — RIFAXIMIN 550 MG: 550 TABLET ORAL at 09:10

## 2017-10-03 RX ADMIN — HEPARIN SODIUM 5000 UNITS: 5000 INJECTION, SOLUTION INTRAVENOUS; SUBCUTANEOUS at 10:10

## 2017-10-03 RX ADMIN — PIPERACILLIN AND TAZOBACTAM 4.5 G: 4; .5 INJECTION, POWDER, FOR SOLUTION INTRAVENOUS at 12:10

## 2017-10-03 RX ADMIN — HEPARIN SODIUM 5000 UNITS: 5000 INJECTION, SOLUTION INTRAVENOUS; SUBCUTANEOUS at 05:10

## 2017-10-03 RX ADMIN — DEXTROSE MONOHYDRATE: 5 INJECTION, SOLUTION INTRAVENOUS at 03:10

## 2017-10-03 RX ADMIN — POTASSIUM & SODIUM PHOSPHATES POWDER PACK 280-160-250 MG 2 PACKET: 280-160-250 PACK at 11:10

## 2017-10-03 RX ADMIN — DEXTROSE MONOHYDRATE: 5 INJECTION, SOLUTION INTRAVENOUS at 10:10

## 2017-10-03 RX ADMIN — PIPERACILLIN AND TAZOBACTAM 4.5 G: 4; .5 INJECTION, POWDER, FOR SOLUTION INTRAVENOUS at 03:10

## 2017-10-03 RX ADMIN — PIPERACILLIN AND TAZOBACTAM 4.5 G: 4; .5 INJECTION, POWDER, FOR SOLUTION INTRAVENOUS at 08:10

## 2017-10-03 RX ADMIN — FAMOTIDINE 20 MG: 20 TABLET, FILM COATED ORAL at 08:10

## 2017-10-03 RX ADMIN — POTASSIUM CHLORIDE 60 MEQ: 20 SOLUTION ORAL at 04:10

## 2017-10-03 RX ADMIN — FAMOTIDINE 20 MG: 20 TABLET, FILM COATED ORAL at 09:10

## 2017-10-03 RX ADMIN — RIFAXIMIN 550 MG: 550 TABLET ORAL at 08:10

## 2017-10-03 NOTE — TELEPHONE ENCOUNTER
Patient status discussed at liver conference.  Patient mental status improving.  Psych to reevaluate for risk stratification related to transplant.

## 2017-10-03 NOTE — ASSESSMENT & PLAN NOTE
2/2 lasix administration and diarrhea from lactulose.  -improvement in hypernatremia noted with increase in D5W to 150 cc/hr.  Na down to 146 today.    -FWD of 2.1L, not including insensible loses.  His electrolyte free water clearance yesterday was ~ 1L, but will be decreased given that his diuretics are being held at this time.  -agree with decreasing D5W to 100 cc/hr.

## 2017-10-03 NOTE — PROGRESS NOTES
27 yom with:  1) Alcoholic hepatitis.   2) Metabolic encephalopathy (hepatic). Improving with lactulose & rifaximin via NG.   3) Acute liver failure. No significant change in liver function throughout current hospitalization.  4) Alcohol abuse.   5) LAURA. Multifactorial, including ATN as the primary cause. Improving.   6) Abdominal pain & distension due to bowel edema/portal hypertension. Improved on exam.   7) Pneumonia. Will continue with zosyn to complete a 7-day course.  8) Severe malnutrition. Tolerating tube feeds via NG.   9) Non-anion gap metabolic acidosis. Due to stool losses. Decrease lactulose again.  10) Anion gap metabolic acidosis. Mild uremia???  11) Hypernatremia. Improved. Needs a little more free water. Continue with D5W infusion but decrease the rate.     Sajan Sandra MD  [facility]

## 2017-10-03 NOTE — PT/OT/SLP EVAL
"Occupational Therapy  Evaluation    Jhonny Diana   MRN: [id number]   Admitting Diagnosis: Alcoholic hepatitis without ascites    OT Date of Treatment: 10/03/17   OT Start Time: 0850  OT Stop Time: 0934  OT Total Time (min): 44 min    Billable Minutes:  Evaluation 10  Self Care/Home Management 24  Therapeutic Activity 10    Diagnosis: Alcoholic hepatitis without ascites     Past Medical History:   Diagnosis Date    Hypertension       Past Surgical History:   Procedure Laterality Date    APPENDECTOMY         Referring physician: Dauterive (NP)   Date referred to OT: 10/02/2017    General Precautions: Standard, fall  Patient History:  Living Environment  Lives With:  (with a roomate. Pt will live with mother upon discharge )  Living Arrangements: house  Home Accessibility:  (4 steps to enter home with bilateral handrails )  Transportation Available: car, family or friend will provide  Living Environment Comment: Pt currently lives with a roomate, pt plans to go back to mothers house upon discharge. [facility] with 4 steps to enter. No DME. Pt was working fulltime as a . Independent with ADL/Self care prior to admission   Equipment Currently Used at Home: none    Prior level of function:   Bed Mobility/Transfers: independent  Grooming: independent  Bathing: independent  Upper Body Dressing: independent  Lower Body Dressing: independent  Toileting: independent  Home Management Skills: independent        Dominant hand: right    Subjective:  Communicated with RN prior to session. RN ok'ed therapy session   Chief Complaint: "Im ok" Pt and patient's mother agreeable to OT session   Patient/Family stated goals: To get better and go home    Pain/Comfort  Pain Rating 1: 0/10    Objective:  Patient found reclined in bed with mother at bedside: telemetry, pulse ox (continuous), peripheral IV, blood pressure cuff, NG tube    Cognitive Exam: Pt is confused/ + delirium  Oriented to: Person and grossly to place " ""hospital"  Follows Commands/attention: Easily distracted and Follows one-step commands  Communication: clear/fluent, unintelligible speech at times   Memory:  Impaired STM and Impaired LTM  Safety awareness/insight to disability: impaired  Coping skills/emotional control: Appropriate to situation    Visual/perceptual:  Intact    Physical Exam:  Postural examination/scapula alignment: Rounded shoulder and Head forward  Skin integrity: Visible skin intact  Edema: Moderate LE edema and abdominal distention     Sensation:   Intact to light touch with localization     Upper Extremity Range of Motion:  Right Upper Extremity: WFL  Left Upper Extremity: WFL    Upper Extremity Strength:  Right Upper Extremity: grossly 3+/5  Left Upper Extremity: grossly 3+/5    Strength: bilateral  3+/5    Fine motor coordination:   Intact  Left hand, finger to nose, Right hand, finger to nose, Left hand thumb/finger opposition skills and Right hand thumb/finger opposition skills    Gross motor coordination: WFL    Functional Mobility:  Bed Mobility:  Rolling/Turning to Left: Maximum assistance  Rolling/Turning Right: Maximum assistance  Scooting/Bridging: Maximum Assistance  Supine to Sit: Maximum Assistance  Sit to Supine: Total Assistance    Transfers:  Sit <> Stand Assistance: Moderate Assistance  Sit <> Stand Assistive Device:  (handheld assist )    Functional Ambulation: pt took 3-4 side steps towards EOB     Activities of Daily Living:  UE Dressing Level of Assistance: Maximum assistance  LE Dressing Level of Assistance: Maximum assistance (donning socks sitting EOB )      Balance:   Static Sit: fair-  Dynamic Sit: fair-  Static Stand: poor+  Dynamic stand: Poor +     Therapeutic Activities and Exercises:  Bed mobility, sit to stand x4, side stepping at EOB   Pt educated on role of OT, plan of care, safety awareness, DME, importance of out of bed activity    AM-PAC 6 CLICK ADL  How much help from another person does this " "patient currently need?  1 = Unable, Total/Dependent Assistance  2 = A lot, Maximum/Moderate Assistance  3 = A little, Minimum/Contact Guard/Supervision  4 = None, Modified Sharkey/Independent    Putting on and taking off regular lower body clothing? : 1  Bathing (including washing, rinsing, drying)?: 1  Toileting, which includes using toilet, bedpan, or urinal? : 1  Putting on and taking off regular upper body clothing?: 2  Taking care of personal grooming such as brushing teeth?: 2  Eating meals?: 2  Total Score: 9    AM-PAC Raw Score CMS "G-Code Modifier Level of Impairment Assistance   6 % Total / Unable   7 - 9 CM 80 - 100% Maximal Assist   10-14 CL 60 - 80% Moderate Assist   15 - 19 CK 40 - 60% Moderate Assist   20 - 22 CJ 20 - 40% Minimal Assist   23 CI 1-20% SBA / CGA   24 CH 0% Independent/ Mod I       Patient left with bed in chair mode with all lines intact, call button in reach, RN notified and mother present    Assessment:  Jhonny Diana is a 27 y.o. male with a medical diagnosis of Alcoholic hepatitis without ascites and presents with confusion, generalized weakness, decreased activity tolerance impacting indep and safety with ADL/Self care and functional mobility. Pt will benefit from skilled OT services to maximize independence and safety with ADL/Self care and functional mobility. Recommend Inpatient rehab as the next level of care     Rehab identified problem list/impairments: Rehab identified problem list/impairments: weakness, impaired endurance, impaired self care skills, impaired functional mobilty, impaired cognition, impaired balance, impaired cardiopulmonary response to activity, edema    Rehab potential is good.    Activity tolerance: Good    Discharge recommendations: Discharge Facility/Level Of Care Needs: rehabilitation facility     Barriers to discharge: Barriers to Discharge: None, Inaccessible home environment (mother is available to assist. 4 [facility] home )    Equipment " recommendations:  (will continue to assess pending progress )     GOALS:    Occupational Therapy Goals        Problem: Occupational Therapy Goal    Goal Priority Disciplines Outcome Interventions   Occupational Therapy Goal     OT, PT/OT Ongoing (interventions implemented as appropriate)    Description:  Goals to be met by: 10/17/2017    Patient will increase functional independence with ADLs by performing:    UE Dressing with Supervision.  LE Dressing with Supervision.  Grooming while seated with Supervision.  Toileting from bedside commode with Supervision for hygiene and clothing management.   Sitting at edge of bed or bedside chair  x30 minutes with Modified [place].  Rolling to Bilateral with Supervision.   Supine to sit with Supervision.  Stand pivot transfers with Minimal Assistance.  Toilet transfer to bedside commode with Minimal Assistance.                      PLAN:  Patient to be seen 5 x/week to address the above listed problems via self-care/home management, therapeutic activities, therapeutic exercises  Plan of Care expires: 10/17/17  Plan of Care reviewed with: patient, mother         Sherita HOLDEN KIRTI Chairez  10/03/2017

## 2017-10-03 NOTE — PLAN OF CARE
Problem: Physical Therapy Goal  Goal: Physical Therapy Goal  Goals to be met by: 10/10/2017    Patient will increase functional independence with mobility by performin. Supine to sit with MInimal Assistance  2. Sit to supine with MInimal Assistance  3. Sit to stand transfer with Minimal Assistance  4. Bed to chair transfer with Moderate Assistance using LRAD or no AD.  5. Gait  x 20 feet with Moderate Assistance using LRAD or no AD.  6. Ascend/descend 4 stair with bilateral Handrails Moderate Assistance using LRAD or no AD.  7. Stand for 8 minutes with Minimal Assistance using LRAD or no AD.  8. Lower extremity exercise program x20 reps per handout, with independence    Outcome: Ongoing (interventions implemented as appropriate)  Eval completed and goals appropriate.

## 2017-10-03 NOTE — ASSESSMENT & PLAN NOTE
--appreciate hepatology's assistance  --continue supportive care for HE, LAURA  --imaging as detailed above.   --currently not transplant candidate     -MELD-Na score: 26 at 10/3/2017  3:54 AM  MELD score: 26 at 10/3/2017  3:54 AM  Calculated from:  Serum Creatinine: 0.9 mg/dL (Rounded to 1) at 10/3/2017  3:54 AM  Serum Sodium: 146 mmol/L (Rounded to 137) at 10/3/2017  3:54 AM  Total Bilirubin: 29.5 mg/dL at 10/3/2017  3:54 AM  INR(ratio): 1.8 at 10/3/2017  3:54 AM  Age: 27 years   --OSH workup: negative jae ab, ceruloplasmin, and sm muscle ab; hepatitis panel negative

## 2017-10-03 NOTE — SUBJECTIVE & OBJECTIVE
Interval History:   No acute events overnight, patient able to work with PT today.    Current Facility-Administered Medications   Medication    dextrose 5 % infusion    famotidine tablet 20 mg    heparin (porcine) injection 5,000 Units    [START ON 10/4/2017] lactulose 20 gram/30 mL solution Soln 15 g    ondansetron injection 4 mg    piperacillin-tazobactam 4.5 g in dextrose 5 % 100 mL IVPB (ready to mix system)    pneumoc 13-arianne conj-dip cr(PF) 0.5 mL    potassium chloride 10% solution 40 mEq    potassium chloride 10% solution 40 mEq    potassium chloride 10% solution 60 mEq    potassium, sodium phosphates 280-160-250 mg packet 2 packet    potassium, sodium phosphates 280-160-250 mg packet 2 packet    potassium, sodium phosphates 280-160-250 mg packet 2 packet    rifAXIMin tablet 550 mg       Objective:     Vital Signs (Most Recent):  Temp: 98.9 °F (37.2 °C) (10/03/17 0300)  Pulse: 105 (10/03/17 0700)  Resp: 18 (10/03/17 0700)  BP: 125/65 (10/03/17 0700)  SpO2: 97 % (10/03/17 0700) Vital Signs (24h Range):  Temp:  [98.7 °F (37.1 °C)-99.8 °F (37.7 °C)] 98.9 °F (37.2 °C)  Pulse:  [104-116] 105  Resp:  [18-31] 18  SpO2:  [97 %-99 %] 97 %  BP: (101-165)/(57-99) 125/65     Weight: 81.5 kg (179 lb 10.8 oz) (10/03/17 0300)  Body mass index is 28.14 kg/m².    Physical Exam   Constitutional: No distress.   HENT:   Head: Normocephalic.   Eyes: Pupils are equal, round, and reactive to light. Scleral icterus is present.   Neck: Normal range of motion.   Cardiovascular:   Sinus tachycardia   Pulmonary/Chest: Effort normal and breath sounds normal.   Abdominal: Bowel sounds are normal. He exhibits distension. He exhibits no mass. There is no tenderness. There is no rebound and no guarding. No hernia.   Musculoskeletal: He exhibits edema.   Neurological: He is alert.   Skin: He is not diaphoretic.       MELD-Na score: 26 at 10/3/2017  3:54 AM  MELD score: 26 at 10/3/2017  3:54 AM  Calculated from:  Serum Creatinine:  0.9 mg/dL (Rounded to 1) at 10/3/2017  3:54 AM  Serum Sodium: 146 mmol/L (Rounded to 137) at 10/3/2017  3:54 AM  Total Bilirubin: 29.5 mg/dL at 10/3/2017  3:54 AM  INR(ratio): 1.8 at 10/3/2017  3:54 AM  Age: 27 years    Significant Labs:  CBC:   Recent Labs  Lab 10/03/17  0354   WBC 24.04*   RBC 2.72*   HGB 10.4*   HCT 30.4*        CMP:   Recent Labs  Lab 10/03/17  0354   *   CALCIUM 8.5*   ALBUMIN 2.4*   PROT 6.3   *   K 4.5   CO2 13*   *   BUN 36*   CREATININE 0.9   ALKPHOS 129   ALT 69*   *   BILITOT 29.5*     Coagulation:   Recent Labs  Lab 09/28/17  1727  10/03/17  0354   INR 1.8*  < > 1.8*   APTT 32.1*  --   --    < > = values in this interval not displayed.    Significant Imaging:  No recent images to review

## 2017-10-03 NOTE — PLAN OF CARE
Problem: Occupational Therapy Goal  Goal: Occupational Therapy Goal  Goals to be met by: 10/17/2017    Patient will increase functional independence with ADLs by performing:    UE Dressing with Supervision.  LE Dressing with Supervision.  Grooming while seated with Supervision.  Toileting from bedside commode with Supervision for hygiene and clothing management.   Sitting at edge of bed or bedside chair  x30 minutes with Modified [place].  Rolling to Bilateral with Supervision.   Supine to sit with Supervision.  Stand pivot transfers with Minimal Assistance.  Toilet transfer to bedside commode with Minimal Assistance.    Outcome: Ongoing (interventions implemented as appropriate)  OT evaluation and Plan of care established 10/03/2017

## 2017-10-03 NOTE — PROGRESS NOTES
[facility]  Nephrology  Progress Note    Patient Name: Jhonny Diana  MRN: [id number]  Admission Date: 9/27/2017  Hospital Length of Stay: 6 days  Attending Provider: Sajan Sandra MD   Primary Care Physician: Provider Notinsystem  Principal Problem:Alcoholic hepatitis without ascites    Subjective:     HPI: Jhonny Diana is a 26 yo male with pMHX of childhood asthma and alcohol abuse, was transferred to [facility] for hepatology evaluation for worsening liver functions.  Information was obtained from family at bedside and review of outside records, given that patient is obtunded.  According to mother at bedside, patient has had multiple admissions to the hospital over the past 6 months for recurrent abdominal pain.  She reports that he has been been seen the ED and has been diagnosed with diverticulitis, UTIs, gastroenteritis, and cholecystitis.  He was never admitted to the hospital and was always discharged.  On 09/06, patient presented to OSH for worsening abdominal pain and distension.  He was diagnosed at that time with ETOH hepatitis.  He was treated with steroids, ceftriaxone for SBP prophylaxis, and diuresed at that time for volume management.  He was discharged home on the 15th, after clearance from GI,a nd was instructed to follow-up with his PCP 1 week after discharge.  He presented to the hospital on 09/25 for worsening abdominal pain and distension with 20 kg weight gain.  He was transferred to [facility] for further care and Nephrology was consulted for LAURA.    On review of outside records, patient Scr on admission was 1.5 (with baseline at 1.0).  According to the family at the bedside, he was not urinating well at the OSH, and not responding to diuretics (spirionolactone/lasix combo).  His mother reports that patient continued to c/o abdominal and inability to urinate, but no chicas was placed until transfer to [facility] yesterday.  Record reviews shows relative hypotension with BPs in the low 90's.   His mother reports that the nursing staff was administrating his narcotic regimen based on his blood pressures, and at times, medication was held due to hypotension.  Since admission to hospital, his UOP has improved, responding well in Lasix 40 IV (770 ml since admission, 700 ml so far this shift at time of consultation).  Admit labs revealed a SCr of 2.6, this morning improved down to 2.4.  Bilirubin remains elevated, >14.  Abdomen remains with tight distension, unable to perform paracentesis, as no significant fluid pocket found.  Mother reports patient did not have para's done at OSH.    Labs on the morning of 09/27 with bicarb of 15.  ABG with alkalemia and primary respiratory alkalosis and anion gap acidosis.     Interval History:   Lasix discontinued yesterday, UOP of 2.3L recorded.  D5W gtt increased to 150 cc/hr, improvement in serum Na noted, down to 146 this morning.  Kidney function has returned to normal with a SCr of 0.9.  Appears more alert today, but continues with intermittent confusion.      Review of patient's allergies indicates:   Allergen Reactions    Bactrim [sulfamethoxazole-trimethoprim] Other (See Comments)     Mookie Trell Syndrome     Current Facility-Administered Medications   Medication Frequency    dextrose 5 % infusion Continuous    famotidine tablet 20 mg BID    heparin (porcine) injection 5,000 Units Q8H    [START ON 10/4/2017] lactulose 20 gram/30 mL solution Soln 15 g Daily    ondansetron injection 4 mg Q6H PRN    piperacillin-tazobactam 4.5 g in dextrose 5 % 100 mL IVPB (ready to mix system) Q8H    pneumoc 13-arianne conj-dip cr(PF) 0.5 mL vaccine x 1 dose    potassium chloride 10% solution 40 mEq PRN    potassium chloride 10% solution 40 mEq PRN    potassium chloride 10% solution 60 mEq PRN    potassium, sodium phosphates 280-160-250 mg packet 2 packet PRN    potassium, sodium phosphates 280-160-250 mg packet 2 packet PRN    potassium, sodium phosphates 280-160-250  mg packet 2 packet PRN    rifAXIMin tablet 550 mg BID       Objective:     Vital Signs (Most Recent):  Temp: 98.9 °F (37.2 °C) (10/03/17 0300)  Pulse: 105 (10/03/17 0700)  Resp: 18 (10/03/17 0700)  BP: 125/65 (10/03/17 0700)  SpO2: 97 % (10/03/17 0700)  O2 Device (Oxygen Therapy): room air (10/03/17 0700) Vital Signs (24h Range):  Temp:  [98.7 °F (37.1 °C)-99.8 °F (37.7 °C)] 98.9 °F (37.2 °C)  Pulse:  [104-116] 105  Resp:  [18-31] 18  SpO2:  [97 %-99 %] 97 %  BP: (101-165)/(57-99) 125/65     Weight: 81.5 kg (179 lb 10.8 oz) (10/03/17 0300)  Body mass index is 28.14 kg/m².  Body surface area is 1.96 meters squared.    I/O last 3 completed shifts:  In: 4126.3 [I.V.:2851.3; NG/GT:775; IV Piggyback:500]  Out: 3410 [Urine:3410]    Physical Exam   Constitutional: He appears well-developed and well-nourished. No distress.   HENT:   Head: Normocephalic and atraumatic.   Eyes: EOM are normal. Scleral icterus is present.   Neck: Normal range of motion. Neck supple.   Cardiovascular: Normal rate and regular rhythm.  Exam reveals no gallop and no friction rub.    No murmur heard.  Pulmonary/Chest: Tachypnea noted. No respiratory distress. He has decreased breath sounds in the right lower field and the left lower field. He has no wheezes. He has no rales.   Abdominal: He exhibits distension and ascites. There is tenderness.   Musculoskeletal: Edema: +1 sacral edema.   Neurological: He is alert. He is disoriented.   Skin: Skin is warm and dry. No rash noted. He is not diaphoretic. No erythema.       Significant Labs:  CBC:   Recent Labs  Lab 10/03/17  0354   WBC 24.04*   RBC 2.72*   HGB 10.4*   HCT 30.4*      *   MCH 38.2*   MCHC 34.2     CMP:   Recent Labs  Lab 10/03/17  0354   *   CALCIUM 8.5*   ALBUMIN 2.4*   PROT 6.3   *   K 4.5   CO2 13*   *   BUN 36*   CREATININE 0.9   ALKPHOS 129   ALT 69*   *   BILITOT 29.5*          Assessment/Plan:     Acute renal failure with tubular necrosis     Non-Oliguric LAURA on Normal Renal Function  -Baseline SCr ~1.0.  Admitted to OSH with SCr of 1.5 and slowly increased through the hospitalization.  Upon arrival at [facility], SCr at 2.6.  -His LAURA is not related to HRS as patient is non-oliguric and normotensive.    -LAURA 2/2 ATN, likely multifactorial.     -Toxic ATN from hyperbilirubinemia and bile salt toxicity vs. Ischemic ATN, as patients  mother reported lower BP's while at OSH when receiving narcotics for pain.    -Urine microscopy with numerous muddy brown casts stained with bili, confirming ATN (and by definition ruling out HRS as a primary cause)   -His urine Na was low normal,would expect to be higher given that patient was on diuretics.  Likely 2/2 decreased renal perfusion from renal vascular congestion from hypervolemia.  -Kidney function improved, back to baseline this morning with normal kidney function.  -had an increase in SCr yesterday afternoon, likely from pre-renal volume depletion.  Responded well to free water administration and return to baseline kidney function.  -recommend holding further diuretics with close monitoring of I/O's, especially stool output to avoid volume depletion.  -consider repeat of ABG  -will sign off at this time.  Please re-consult as need or call with questions.        Hypernatremia    2/2 lasix administration and diarrhea from lactulose.  -improvement in hypernatremia noted with increase in D5W to 150 cc/hr.  Na down to 146 today.    -FWD of 2.1L, not including insensible loses.  His electrolyte free water clearance yesterday was ~ 1L, but will be decreased given that his diuretics are being held at this time.  -agree with decreasing D5W to 100 cc/hr.              Derrick Love NP  Nephrology  [facility]  Pager:  [phone]

## 2017-10-03 NOTE — ASSESSMENT & PLAN NOTE
--2/2 free water losses in stool  --improving with D5W infusion, decrease to 100 ml/hour today.  Free Water Deficit 2.1 L  --enteral water not given due to bowel edema.   --monitor BMPs

## 2017-10-03 NOTE — ASSESSMENT & PLAN NOTE
--corrected AG 20; continue to monitor  --suspect 2/2 uremia, lactate and beta hydroxybutyrate wnl.   --delta ratio: 0.73    --also with non anion gap acidosis due to bicarbonate losses from GI tract. See below

## 2017-10-03 NOTE — PLAN OF CARE
Patient remains at ICU level of care.  Hepatology, Nephrology, and Addiction Psych following.  Hypernatremia with D5 gtt infusing.  Patient remains disoriented, tolerating tube feeds and NGT meds.  PT/OT eval/treat orders noted, awaiting recs to initiate disposition planning.  CM will continue to follow.     10/03/17 1145   Right Care Assessment   Can the patient answer the patient profile reliably? No, cognitively impaired   How often would a person be available to care for the patient? Whenever needed   Describe the patient's ability to walk at the present time. Does not walk or unable to take any steps at all   How does the patient rate their overall health at the present time? Poor   Number of comorbid conditions (as recorded on the chart) Two   During the past month, has the patient often been bothered by feeling down, depressed or hopeless? No   During the past month, has the patient often been bothered by little interest or pleasure in doing things? No   Nicole Newell RN, BSN  Case Management  [facility]  Ext. [phone]

## 2017-10-03 NOTE — SUBJECTIVE & OBJECTIVE
Interval History/Significant Events: nurse reports 12 episodes of diarrhea over last 24 hours.  Remains confused, disoriented, following request.     Review of Systems   Constitutional: Negative for fever.        Limited due to encephalopathy   Respiratory: Negative for cough and shortness of breath.    Cardiovascular: Negative for chest pain.   Gastrointestinal: Positive for abdominal distention. Negative for abdominal pain, nausea and vomiting.   Neurological: Negative for headaches.     Objective:     Vital Signs (Most Recent):  Temp: 98.9 °F (37.2 °C) (10/03/17 0300)  Pulse: 105 (10/03/17 0700)  Resp: 18 (10/03/17 0700)  BP: 125/65 (10/03/17 0700)  SpO2: 97 % (10/03/17 0700) Vital Signs (24h Range):  Temp:  [98.7 °F (37.1 °C)-99.8 °F (37.7 °C)] 98.9 °F (37.2 °C)  Pulse:  [104-116] 105  Resp:  [18-31] 18  SpO2:  [97 %-99 %] 97 %  BP: (101-165)/(57-99) 125/65   Weight: 81.5 kg (179 lb 10.8 oz)  Body mass index is 28.14 kg/m².      Intake/Output Summary (Last 24 hours) at 10/03/17 1433  Last data filed at 10/03/17 0700   Gross per 24 hour   Intake          3038.75 ml   Output              780 ml   Net          2258.75 ml       Physical Exam   Constitutional: He appears well-developed. He has a sickly appearance. He appears ill. No distress.   HENT:   Head: Normocephalic and atraumatic.   Eyes: Pupils are equal, round, and reactive to light. Right eye exhibits no discharge. Left eye exhibits no discharge. Scleral icterus is present.   Neck: Neck supple. No tracheal deviation present. No thyromegaly present.   Cardiovascular: Regular rhythm, S1 normal, S2 normal and intact distal pulses.  Tachycardia present.    Extremities warm to touch. No peripheral edema   Pulmonary/Chest: No accessory muscle usage. No tachypnea. No respiratory distress. He has no decreased breath sounds. He has no wheezes. He has no rales.   Abdominal: Soft. Bowel sounds are normal. He exhibits distension. There is no tenderness. There is no  rebound and no guarding.   Lymphadenopathy:     He has no cervical adenopathy.   Neurological: He is alert. GCS eye subscore is 4. GCS verbal subscore is 4. GCS motor subscore is 6.   Alert, oriented to person only. Rambling speech. Following commands and equal strength bilaterally   Skin: Skin is dry. He is not diaphoretic. There is pallor.   jaundiced   Nursing note and vitals reviewed.      Vents:     Lines/Drains/Airways     Drain                 NG/OG Tube 09/30/17 1551 Kings sump 14 Fr. Right nostril 2 days    Male External Urinary Catheter 10/02/17 1835 less than 1 day          Peripheral Intravenous Line                 Peripheral IV - Single Lumen Left Hand -- days         Midline Catheter Insertion/Assessment  - Single Lumen 09/27/17 1800 Right 5 days              Significant Labs:    CBC/Anemia Profile:    Recent Labs  Lab 10/02/17  0300 10/03/17  0354   WBC 22.37* 24.04*   HGB 10.3* 10.4*   HCT 30.4* 30.4*    161   * 112*   RDW 15.8* 15.3*        Chemistries:    Recent Labs  Lab 10/02/17  0456 10/02/17  1442 10/03/17  0008 10/03/17  0354   * 150*  --  146*   K 4.0 2.8* 3.0* 4.5   * 119*  --  118*   CO2 15* 18*  --  13*   BUN 45* 42*  --  36*   CREATININE 1.1 1.6*  --  0.9   CALCIUM 8.8 9.1  --  8.5*   ALBUMIN 2.7*  --   --  2.4*   PROT 6.3  --   --  6.3   BILITOT 30.2*  --   --  29.5*   ALKPHOS 113  --   --  129   ALT 66*  --   --  69*   *  --   --  148*   MG 2.3  --   --  2.2   PHOS 2.3*  --   --  2.4*       All pertinent labs within the past 24 hours have been reviewed.    Significant Imaging:  I have reviewed and interpreted all pertinent imaging results/findings within the past 24 hours.

## 2017-10-03 NOTE — ASSESSMENT & PLAN NOTE
Non-Oliguric LAURA on Normal Renal Function  -Baseline SCr ~1.0.  Admitted to OSH with SCr of 1.5 and slowly increased through the hospitalization.  Upon arrival at C, SCr at 2.6.  -His LAURA is not related to HRS as patient is non-oliguric and normotensive.    -LAURA 2/2 ATN, likely multifactorial.     -Toxic ATN from hyperbilirubinemia and bile salt toxicity vs. Ischemic ATN, as patients  mother reported lower BP's while at OSH when receiving narcotics for pain.    -Urine microscopy with numerous muddy brown casts stained with bili, confirming ATN (and by definition ruling out HRS as a primary cause)   -His urine Na was low normal,would expect to be higher given that patient was on diuretics.  Likely 2/2 decreased renal perfusion from renal vascular congestion from hypervolemia.  -Kidney function improved, back to baseline this morning with normal kidney function.  -had an increase in SCr yesterday afternoon, likely from pre-renal volume depletion.  Responded well to free water administration and return to baseline kidney function.  -recommend holding further diuretics with close monitoring of I/O's, especially stool output to avoid volume depletion.  -consider repeat of ABG  -will sign off at this time.  Please re-consult as need or call with questions.

## 2017-10-03 NOTE — ASSESSMENT & PLAN NOTE
--continue lactulose, rifaxamin per NGT. Decrease lactulose to daily given multiple bowel movements.

## 2017-10-03 NOTE — PT/OT/SLP EVAL
Physical Therapy  Evaluation    Jhonny Diana   MRN: [id number]   Admitting Diagnosis: Alcoholic hepatitis without ascites    PT Received On: 10/03/17  PT Start Time: 0852     PT Stop Time: 0934    PT Total Time (min): 42 min       Billable Minutes:  Evaluation 15 and Neuromuscular Re-education 20    Diagnosis: Alcoholic hepatitis without ascites      Past Medical History:   Diagnosis Date    Hypertension       Past Surgical History:   Procedure Laterality Date    APPENDECTOMY         Referring physician: Kaila Falcon  Date referred to PT: 10/2/2017    General Precautions: Standard, fall  Orthopedic Precautions: N/A   Braces: N/A       Do you have any cultural, spiritual, Amish conflicts, given your current situation?: none reported     Patient History:  Living Environment Comment: Pt currently lives with female roommate in an apartment in [place], [place]. Pt plans to return to [facility] upon d/c that has 4 steps to enter and B HR. Prior to admit pt indepednent with ambulation and ADL's. Pt works full time as .  Equipment Currently Used at Home: none  DME owned (not currently used): none    Previous Level of Function:  Ambulation Skills: independent  Transfer Skills: independent    Subjective:  Communicated with nsg prior to session. Pt disoriented upon entering room, only knowing first name, birthday year, and that he was in a hospital. Pt's conversations became longer and more clear after sitting and standing during session.     Chief Complaint: pain   Patient goals: to return home     Pain/Comfort  Pain Rating 1: 5/10 (stomach )  Location - Orientation 1: generalized  Location 1:  (stomach)  Pain Addressed 1: Distraction, Nurse notified  Pain Rating Post-Intervention 1: 5/10      Objective:   Patient found with: telemetry, pulse ox (continuous), peripheral IV, blood pressure cuff, NG tube     Cognitive Exam:  Oriented to: Person and Situation    Follows Commands/attention: Follows  one-step commands  Communication: clear/fluent with periods of confusion and inaudible mumbling   Safety awareness/insight to disability: impaired    Physical Exam:    Lower Extremity Range of Motion:  Right Lower Extremity: Sitting EOB AROM: max decrease in hip flexion; mod decrease in knee flex/ext and ankle dorsi/plantar   Left Lower Extremity: Sitting EOB AROM: max decrease in hip flexion; mod decrease in knee flex/ext and ankle dorsi/plantar     Lower Extremity Strength:  Right Lower Extremity: grossly 3+/5  Left Lower Extremity: grossly 3+/5  B ankle clonus      Gross motor coordination: WFL B LE alternating toe taps     Functional Mobility:  Bed Mobility:   Sit<>Supine: Total A   Rolling R and L to change sheets: Max A  Scooting to EOB: Max A, required physical assist to move hips forward     Transfers:  Sit <> Stand Assistance: Moderate Assistance; verbal cues for technique   Sit <> Stand Assistive Device: No Assistive Device    Gait:   Gait Distance: 3-4 side steps toward HOB  Assistance 1: Moderate assistance; physical assist needed to increase step length for first 2 steps   Gait Assistive Device: Hand held assist    Neuromuscular Re-education   Balance:   Static Sit: Mod Assist; Pt with verbal cues and physical assist to correct LOB; at times, pt required mod assist to correct posterior LOB's  Dynamic Sit: Max assist to maintain upright posture and prevent posterior LOB   Static Stand: x 6 minutes with Mod A; pt working on maintaining static standing balance and increase standing tolerance; pt reported being tired after standing    Therapeutic Activities and Exercises:  HEP given to mother to perform 2x a day with B LE  Ankle pumps x 10 reps  Heel slides x 10 reps  Hip Abd/Add x 10 reps     AM-PAC 6 CLICK MOBILITY  How much help from another person does this patient currently need?   1 = Unable, Total/Dependent Assistance  2 = A lot, Maximum/Moderate Assistance  3 = A little, Minimum/Contact  Guard/Supervision  4 = None, Modified [place]/Independent    Turning over in bed (including adjusting bedclothes, sheets and blankets)?: 2  Sitting down on and standing up from a chair with arms (e.g., wheelchair, bedside commode, etc.): 2  Moving from lying on back to sitting on the side of the bed?: 2  Moving to and from a bed to a chair (including a wheelchair)?: 2  Need to walk in hospital room?: 2  Climbing 3-5 steps with a railing?: 1  Total Score: 11     AM-PAC Raw Score CMS G-Code Modifier Level of Impairment Assistance   6 % Total / Unable   7 - 9 CM 80 - 100% Maximal Assist   10 - 14 CL 60 - 80% Moderate Assist   15 - 19 CK 40 - 60% Moderate Assist   20 - 22 CJ 20 - 40% Minimal Assist   23 CI 1-20% SBA / CGA   24 CH 0% Independent/ Mod I     Patient left with bed in chair position with all lines intact, call button in reach, nsg notified and nsg and mother present. Transfer to chair deferred to next session 2nd to frequent bowel movements and leakage. Nsg reported that she will try to get bowel under control to allow for chair sitting next session.     Assessment:   Jhonny Diana is a 27 y.o. male with a medical diagnosis of Alcoholic hepatitis without ascites and presents with impaired functional mobility 2nd to impairments listed below. Pt's cognition improved with upright posture in sitting and standing. Pt with pain, decreased activity tolerance, decreased balance, decreased endurance, and B ankle clonus but presented with good participation with therapy. Goal next session is to get pt in chair pending bowel management to improve activity tolerance and cognition. Pt would benefit from skilled PT to address impairments and return to PLOF. Pt would also benefit from inpatient rehab upon d/c to increase endurance, activity tolerance, and safety before return to home.     Rehab identified problem list/impairments: Rehab identified problem list/impairments: weakness, impaired endurance, impaired  sensation, impaired self care skills, impaired functional mobilty, gait instability, impaired balance, impaired cognition, decreased safety awareness, pain, edema    Rehab potential is good.    Activity tolerance: Good    Discharge recommendations: Discharge Facility/Level Of Care Needs: rehabilitation facility     Barriers to discharge: Barriers to Discharge: Inaccessible home environment (4 ALEXA to enter mother's home with B HR)    Equipment recommendations: Equipment Needed After Discharge:  (TBD pending progress)     GOALS:    Physical Therapy Goals        Problem: Physical Therapy Goal    Goal Priority Disciplines Outcome Goal Variances Interventions   Physical Therapy Goal     PT/OT, PT Ongoing (interventions implemented as appropriate)     Description:  Goals to be met by: 10/10/2017    Patient will increase functional independence with mobility by performin. Supine to sit with MInimal Assistance  2. Sit to supine with MInimal Assistance  3. Sit to stand transfer with Minimal Assistance  4. Bed to chair transfer with Moderate Assistance using LRAD or no AD.  5. Gait  x 20 feet with Moderate Assistance using LRAD or no AD.  6. Ascend/descend 4 stair with bilateral Handrails Moderate Assistance using LRAD or no AD.  7. Stand for 8 minutes with Minimal Assistance using LRAD or no AD.  8. Lower extremity exercise program x20 reps per handout, with independence                      PLAN:    Patient to be seen 5 x/week to address the above listed problems via gait training, therapeutic activities, therapeutic exercises, neuromuscular re-education  Plan of Care expires: 17  Plan of Care reviewed with: patient, mother          Aminata CUAUHTEMOC Hunter, PT  10/03/2017

## 2017-10-03 NOTE — ASSESSMENT & PLAN NOTE
--appreciate nephrology's assistance  --suspect LAURA multifactorial: ischemic ATN given hypotension and injury due to hyperbilirubinemia. Felt less likely to be HRS however have continued albumin with lasix  --overall, LAURA improving and making adequate urine  --trend BMP's, intake/output. Avoid nephrotoxic agents  --previous US renal with medical renal disease

## 2017-10-03 NOTE — PLAN OF CARE
Problem: Patient Care Overview  Goal: Plan of Care Review  Outcome: Ongoing (interventions implemented as appropriate)  No acute events throughout shift. Patient still remains disoriented at most times, with disorganized thinking and mumbling to himself all night. Soft wrist restraints continued. HR remains ST in low 100s and BP stable all shift. Tolerating tube feeds at 30cc/hour, 3+ bowel movements this shift. Urine still dark carlos, U.O 40-50cc/hr per condom cath.     Mother at bedside throughout the night. POC reviewed with patient and mother, all questions and concerns addressed. Will continue to monitor.

## 2017-10-03 NOTE — SUBJECTIVE & OBJECTIVE
Interval History:   Lasix discontinued yesterday, UOP of 2.3L recorded.  D5W gtt increased to 150 cc/hr, improvement in serum Na noted, down to 146 this morning.  Kidney function has returned to normal with a SCr of 0.9.  Appears more alert today, but continues with intermittent confusion.      Review of patient's allergies indicates:   Allergen Reactions    Bactrim [sulfamethoxazole-trimethoprim] Other (See Comments)     Mookie Trell Syndrome     Current Facility-Administered Medications   Medication Frequency    dextrose 5 % infusion Continuous    famotidine tablet 20 mg BID    heparin (porcine) injection 5,000 Units Q8H    [START ON 10/4/2017] lactulose 20 gram/30 mL solution Soln 15 g Daily    ondansetron injection 4 mg Q6H PRN    piperacillin-tazobactam 4.5 g in dextrose 5 % 100 mL IVPB (ready to mix system) Q8H    pneumoc 13-arianne conj-dip cr(PF) 0.5 mL vaccine x 1 dose    potassium chloride 10% solution 40 mEq PRN    potassium chloride 10% solution 40 mEq PRN    potassium chloride 10% solution 60 mEq PRN    potassium, sodium phosphates 280-160-250 mg packet 2 packet PRN    potassium, sodium phosphates 280-160-250 mg packet 2 packet PRN    potassium, sodium phosphates 280-160-250 mg packet 2 packet PRN    rifAXIMin tablet 550 mg BID       Objective:     Vital Signs (Most Recent):  Temp: 98.9 °F (37.2 °C) (10/03/17 0300)  Pulse: 105 (10/03/17 0700)  Resp: 18 (10/03/17 0700)  BP: 125/65 (10/03/17 0700)  SpO2: 97 % (10/03/17 0700)  O2 Device (Oxygen Therapy): room air (10/03/17 0700) Vital Signs (24h Range):  Temp:  [98.7 °F (37.1 °C)-99.8 °F (37.7 °C)] 98.9 °F (37.2 °C)  Pulse:  [104-116] 105  Resp:  [18-31] 18  SpO2:  [97 %-99 %] 97 %  BP: (101-165)/(57-99) 125/65     Weight: 81.5 kg (179 lb 10.8 oz) (10/03/17 0300)  Body mass index is 28.14 kg/m².  Body surface area is 1.96 meters squared.    I/O last 3 completed shifts:  In: 4126.3 [I.V.:2851.3; NG/GT:775; IV Piggyback:500]  Out: 3410  [Urine:3410]    Physical Exam   Constitutional: He appears well-developed and well-nourished. No distress.   HENT:   Head: Normocephalic and atraumatic.   Eyes: EOM are normal. Scleral icterus is present.   Neck: Normal range of motion. Neck supple.   Cardiovascular: Normal rate and regular rhythm.  Exam reveals no gallop and no friction rub.    No murmur heard.  Pulmonary/Chest: Tachypnea noted. No respiratory distress. He has decreased breath sounds in the right lower field and the left lower field. He has no wheezes. He has no rales.   Abdominal: He exhibits distension and ascites. There is tenderness.   Musculoskeletal: Edema: +1 sacral edema.   Neurological: He is alert. He is disoriented.   Skin: Skin is warm and dry. No rash noted. He is not diaphoretic. No erythema.       Significant Labs:  CBC:   Recent Labs  Lab 10/03/17  0354   WBC 24.04*   RBC 2.72*   HGB 10.4*   HCT 30.4*      *   MCH 38.2*   MCHC 34.2     CMP:   Recent Labs  Lab 10/03/17  0354   *   CALCIUM 8.5*   ALBUMIN 2.4*   PROT 6.3   *   K 4.5   CO2 13*   *   BUN 36*   CREATININE 0.9   ALKPHOS 129   ALT 69*   *   BILITOT 29.5*

## 2017-10-03 NOTE — ASSESSMENT & PLAN NOTE
--continue empiric zosyn, started 9/28  --on room air.  Chest xray grossly unchanged with bilateral patchy infiltrates

## 2017-10-03 NOTE — PROGRESS NOTES
"[facility]  Hepatology  Progress Note    Patient Name: Jhonny Diana  MRN: [id number]  Admission Date: 9/27/2017  Hospital Length of Stay: 6 days  Attending Provider: Sajan Sandra MD   Primary Care Physician: Provider Notinsystem  Principal Problem:Alcoholic hepatitis without ascites    Subjective:     Transplant status: No    HPI: 28 yo male with hx of childhood asthma and heavy alcohol abuse (fifth of liquor daily since teenager, last drink 2.5 wks ago per mother) who was transferred to [facility] for higher level of care/liver transplant evaluation.    History obtained per chart review and discussion with mother: Patient initially presented to OSH for a 2 mo hx of progressively worsening abdominal pain with associated fatigue, jaundice, and abdominal distention with ~20# weight gain.     The patient's mother states he began having abdominal pain about 6 months ago.  States he underwent many tests, all of which were inconclusive.  He then began to have abdominal swelling about two months ago and was in&out of the local ER.  He was still actively drinking up until about 2.5 weeks ago according to the mother but she is unsure of this.  At the OSH the patient underwent an EGD which showed g1EV.  He was also diagnosed with "pancreatitis" based on an elevated lipase.  The patient was also placed on prednisone given concerns for alcoholic hepatitis.           Interval History:   No acute events overnight, patient able to work with PT today.    Current Facility-Administered Medications   Medication    dextrose 5 % infusion    famotidine tablet 20 mg    heparin (porcine) injection 5,000 Units    [START ON 10/4/2017] lactulose 20 gram/30 mL solution Soln 15 g    ondansetron injection 4 mg    piperacillin-tazobactam 4.5 g in dextrose 5 % 100 mL IVPB (ready to mix system)    pneumoc 13-arianne conj-dip cr(PF) 0.5 mL    potassium chloride 10% solution 40 mEq    potassium chloride 10% solution 40 mEq    " potassium chloride 10% solution 60 mEq    potassium, sodium phosphates 280-160-250 mg packet 2 packet    potassium, sodium phosphates 280-160-250 mg packet 2 packet    potassium, sodium phosphates 280-160-250 mg packet 2 packet    rifAXIMin tablet 550 mg       Objective:     Vital Signs (Most Recent):  Temp: 98.9 °F (37.2 °C) (10/03/17 0300)  Pulse: 105 (10/03/17 0700)  Resp: 18 (10/03/17 0700)  BP: 125/65 (10/03/17 0700)  SpO2: 97 % (10/03/17 0700) Vital Signs (24h Range):  Temp:  [98.7 °F (37.1 °C)-99.8 °F (37.7 °C)] 98.9 °F (37.2 °C)  Pulse:  [104-116] 105  Resp:  [18-31] 18  SpO2:  [97 %-99 %] 97 %  BP: (101-165)/(57-99) 125/65     Weight: 81.5 kg (179 lb 10.8 oz) (10/03/17 0300)  Body mass index is 28.14 kg/m².    Physical Exam   Constitutional: No distress.   HENT:   Head: Normocephalic.   Eyes: Pupils are equal, round, and reactive to light. Scleral icterus is present.   Neck: Normal range of motion.   Cardiovascular:   Sinus tachycardia   Pulmonary/Chest: Effort normal and breath sounds normal.   Abdominal: Bowel sounds are normal. He exhibits distension. He exhibits no mass. There is no tenderness. There is no rebound and no guarding. No hernia.   Musculoskeletal: He exhibits edema.   Neurological: He is alert.   Skin: He is not diaphoretic.       MELD-Na score: 26 at 10/3/2017  3:54 AM  MELD score: 26 at 10/3/2017  3:54 AM  Calculated from:  Serum Creatinine: 0.9 mg/dL (Rounded to 1) at 10/3/2017  3:54 AM  Serum Sodium: 146 mmol/L (Rounded to 137) at 10/3/2017  3:54 AM  Total Bilirubin: 29.5 mg/dL at 10/3/2017  3:54 AM  INR(ratio): 1.8 at 10/3/2017  3:54 AM  Age: 27 years    Significant Labs:  CBC:   Recent Labs  Lab 10/03/17  0354   WBC 24.04*   RBC 2.72*   HGB 10.4*   HCT 30.4*        CMP:   Recent Labs  Lab 10/03/17  0354   *   CALCIUM 8.5*   ALBUMIN 2.4*   PROT 6.3   *   K 4.5   CO2 13*   *   BUN 36*   CREATININE 0.9   ALKPHOS 129   ALT 69*   *   BILITOT 29.5*      Coagulation:   Recent Labs  Lab 09/28/17  1727  10/03/17  0354   INR 1.8*  < > 1.8*   APTT 32.1*  --   --    < > = values in this interval not displayed.    Significant Imaging:  No recent images to review    Assessment/Plan:     Decompensated hepatic cirrhosis    27 year old male with a history of alcohol abuse transferred from OSH for higher level of care. On admission his issues were alcoholic hepatitis, possible acute pancreatitis (4/5 BiSAP score, high mortality) MELD-Na score 34, acute renal injury, malnutrition, and debility.    Mentation continues to improve. This morning patient was even able to work with Pt. Hopeful that he will be able to have addiction [facility]eval him by the end of the week. In regards to his WBC, it continues to increase this Am, therefore in an abundance of caution primary team to rule out C diff.    Recommendations:  -Continue Lactulose (can be titrated to ~ 3-5 BM per day) and Rifaximin  -Monitor for signs of infection, worsening WBC this Am could be secondary to AH vs new sources of infection (primary team to check C diff)  -Addiction psych will re-visit with patient if/when mental status improves.   -Obtain outside records from [facility] in [place] and the [facility].           Hypernatremia    Hypernatremia mildly improving this morning    Recommendations:  -Appreciate Nephro recs        Acute renal failure with tubular necrosis    Recommendations:  -Appreciate Nephrology recs            Thank you for your consult. I will follow-up with patient. Please contact us if you have any additional questions.    Divya Booker M.D.  Gastroenterology Fellow, PGY-IV  Pager: [phone]  [facility]

## 2017-10-03 NOTE — ASSESSMENT & PLAN NOTE
--repeat blood cultures and check UA  --send C diff given diarrhea  --chest xray grossly unchanged, on room air.   --continue pip/tazo  --continues to be afebrile without hypotension

## 2017-10-03 NOTE — ASSESSMENT & PLAN NOTE
27 year old male with a history of alcohol abuse transferred from OSH for higher level of care. On admission his issues were alcoholic hepatitis, possible acute pancreatitis (4/5 BiSAP score, high mortality) MELD-Na score 34, acute renal injury, malnutrition, and debility.    Mentation continues to improve. This morning patient was even able to work with Pt. Hopeful that he will be able to have addiction pscyh re-eval him by the end of the week. In regards to his WBC, it continues to increase this Am, therefore in an abundance of caution primary team to rule out C diff.    Recommendations:  -Continue Lactulose (can be titrated to ~ 3-5 BM per day) and Rifaximin  -Monitor for signs of infection, worsening WBC this Am could be secondary to AH vs new sources of infection (primary team to check C diff)  -Addiction psych will re-visit with patient if/when mental status improves.   -Obtain outside records from [facility] in [place] and the [facility].

## 2017-10-03 NOTE — PROGRESS NOTES
"[facility]  Critical Care Medicine  Progress Note    Patient Name: Jhonny Diana  MRN: [id number]  Admission Date: 9/27/2017  Hospital Length of Stay: 6 days  Code Status: Full Code  Attending Provider: Sajan Sandra MD  Primary Care Provider: Provider Notinsystem   Principal Problem: Alcoholic hepatitis without ascites    Subjective:     HPI:  Mr. Diana is a 26 y/o male with hx of childhood asthma and heavy alcohol abuse (fifth of liquor daily since teenager, last drink 2.5 wks ago per mother) who was transferred to [facility] for higher level of care/liver transplant evaluation. Patient initially presented to OSH for a 2 mo hx of progressively worsening abdominal pain with associated fatigue, jaundice, and abdominal distention with ~20# weight gain. HPI given by patient's mother and OSH records as patient's participation in interview limited by previous administration of pain medication at OSH.     Per mother, patient did not have a fever, chills, dark stools, known sick contacts, nausea, vomiting, diarrhea, confusion, or HA. Reports family hx of alcoholic cirrhosis in father. Pt was recently diagnosed with alcoholic hepatitis during his admission at OSH on 9/6-9/15. During that hospitalization, patient was given ceftriaxone for SBP ppx, prednisone for alcoholic hepatitis, and diuresed. Pt was discharged and instructed to follow up with PCP and AA within 1 wk. After his discharge, patient returned to ED 4 days after for increased weight gain and worsening abdominal pain/jaundice. At OSH, patient was given fluids (for suspected pancreatitis), diuresed and given IV albumin. Two days prior to transfer to [facility], patient had an episode of hematemesis and underwent an EGD on 9/25 that revealed "1 sm varix in distal esophagus, no stigmata of bleeding, gastropathy of portal hypertension, and reflux esophagitis".  No further episodes of hematemesis noted since initial episode.       Recent labs at OSH: Na " 134, CO2 18, BUN 36, Cr 2, Tprot 5.9, albumin 1.9, Tbili 24, alk phos 83, , ALT51. Patient also had lipase 532 and ammonia 118 during his previous hospitalization at OSH. Labwork also notable for negative mitochondrial ab, sm muscle ab, and ceruloplasmin 22. Imaging at OSH revealing abdo U/S (9/20): hepatosplenomegaly, CT abdo w contrast: hepatomegaly with marked fatty infiltration of the liver and mild ascites (9/6). No CDs available for review.          Hospital/ICU Course:  Mr. Diana was admitted for decompensated alcoholic hepatitis with high MELD score, severe HE, and LAURA. Hepatology consulted and following to evaluate liver transplant candidacy. Patient not currently a transplant candidate due to recent alcohol use; recommending re-evaluation by addiction psych and social work when more medically stable and able to participate. Pt started on albumin and lasix for LAURA and possibility for HRS. However nephrology felt that LAURA is likely multifactorial: ATN given hypotensive episodes at OSH and toxic given severe hyperbilirubinemia; not truly HRS. Lactulose/rifaximin started for HE and mental status improving slowly. Mr. Diana has had significant ABD pain since admission with ABD distension. CT abdomen was significant for pericolonic fat stranding of right colon; no obstruction. Also with diffuse mesenteric edema. No significant ascites noted, no cholecystitis/cholelithiasis/ductal dilation. US liver with doppler showed appropriate vasculature.      Interval History/Significant Events: nurse reports 12 episodes of diarrhea over last 24 hours.  Remains confused, disoriented, following request.     Review of Systems   Constitutional: Negative for fever.        Limited due to encephalopathy   Respiratory: Negative for cough and shortness of breath.    Cardiovascular: Negative for chest pain.   Gastrointestinal: Positive for abdominal distention. Negative for abdominal pain, nausea and vomiting.   Neurological:  Negative for headaches.     Objective:     Vital Signs (Most Recent):  Temp: 98.9 °F (37.2 °C) (10/03/17 0300)  Pulse: 105 (10/03/17 0700)  Resp: 18 (10/03/17 0700)  BP: 125/65 (10/03/17 0700)  SpO2: 97 % (10/03/17 0700) Vital Signs (24h Range):  Temp:  [98.7 °F (37.1 °C)-99.8 °F (37.7 °C)] 98.9 °F (37.2 °C)  Pulse:  [104-116] 105  Resp:  [18-31] 18  SpO2:  [97 %-99 %] 97 %  BP: (101-165)/(57-99) 125/65   Weight: 81.5 kg (179 lb 10.8 oz)  Body mass index is 28.14 kg/m².      Intake/Output Summary (Last 24 hours) at 10/03/17 1433  Last data filed at 10/03/17 0700   Gross per 24 hour   Intake          3038.75 ml   Output              780 ml   Net          2258.75 ml       Physical Exam   Constitutional: He appears well-developed. He has a sickly appearance. He appears ill. No distress.   HENT:   Head: Normocephalic and atraumatic.   Eyes: Pupils are equal, round, and reactive to light. Right eye exhibits no discharge. Left eye exhibits no discharge. Scleral icterus is present.   Neck: Neck supple. No tracheal deviation present. No thyromegaly present.   Cardiovascular: Regular rhythm, S1 normal, S2 normal and intact distal pulses.  Tachycardia present.    Extremities warm to touch. No peripheral edema   Pulmonary/Chest: No accessory muscle usage. No tachypnea. No respiratory distress. He has no decreased breath sounds. He has no wheezes. He has no rales.   Abdominal: Soft. Bowel sounds are normal. He exhibits distension. There is no tenderness. There is no rebound and no guarding.   Lymphadenopathy:     He has no cervical adenopathy.   Neurological: He is alert. GCS eye subscore is 4. GCS verbal subscore is 4. GCS motor subscore is 6.   Alert, oriented to person only. Rambling speech. Following commands and equal strength bilaterally   Skin: Skin is dry. He is not diaphoretic. There is pallor.   jaundiced   Nursing note and vitals reviewed.      Vents:     Lines/Drains/Airways     Drain                 NG/OG Tube  09/30/17 1551 Pelon sump 14 Fr. Right nostril 2 days    Male External Urinary Catheter 10/02/17 1835 less than 1 day          Peripheral Intravenous Line                 Peripheral IV - Single Lumen Left Hand -- days         Midline Catheter Insertion/Assessment  - Single Lumen 09/27/17 1800 Right 5 days              Significant Labs:    CBC/Anemia Profile:    Recent Labs  Lab 10/02/17  0300 10/03/17  0354   WBC 22.37* 24.04*   HGB 10.3* 10.4*   HCT 30.4* 30.4*    161   * 112*   RDW 15.8* 15.3*        Chemistries:    Recent Labs  Lab 10/02/17  0456 10/02/17  1442 10/03/17  0008 10/03/17  0354   * 150*  --  146*   K 4.0 2.8* 3.0* 4.5   * 119*  --  118*   CO2 15* 18*  --  13*   BUN 45* 42*  --  36*   CREATININE 1.1 1.6*  --  0.9   CALCIUM 8.8 9.1  --  8.5*   ALBUMIN 2.7*  --   --  2.4*   PROT 6.3  --   --  6.3   BILITOT 30.2*  --   --  29.5*   ALKPHOS 113  --   --  129   ALT 66*  --   --  69*   *  --   --  148*   MG 2.3  --   --  2.2   PHOS 2.3*  --   --  2.4*       All pertinent labs within the past 24 hours have been reviewed.    Significant Imaging:  I have reviewed and interpreted all pertinent imaging results/findings within the past 24 hours.    Assessment/Plan:     Psychiatric   Alcoholism /alcohol abuse    --reported about a fifth of whiskey daily since teenager  --appreciate psychiatry's and hepatology's assistance  --follow up PET        Pulmonary   Pneumonia of both lower lobes due to infectious organism    --continue empiric zosyn, started 9/28  --on room air.  Chest xray grossly unchanged with bilateral patchy infiltrates        Renal/   Hypernatremia    --2/2 free water losses in stool  --improving with D5W infusion, decrease to 100 ml/hour today.  Free Water Deficit 2.1 L  --enteral water not given due to bowel edema.   --monitor BMPs        Metabolic acidosis with normal anion gap and bicarbonate losses    --2/2 diarrhea given lactulose  --will decrease frequency of  lactulose as is making significant amount of stool   --continue to monitor         Increased anion gap metabolic acidosis    --corrected AG 20; continue to monitor  --suspect 2/2 uremia, lactate and beta hydroxybutyrate wnl.   --delta ratio: 0.73    --also with non anion gap acidosis due to bicarbonate losses from GI tract. See below             Acute renal failure with tubular necrosis    --appreciate nephrology's assistance  --suspect LAURA multifactorial: ischemic ATN given hypotension and injury due to hyperbilirubinemia. Felt less likely to be HRS however have continued albumin with lasix  --overall, LAURA improving and making adequate urine  --trend BMP's, intake/output. Avoid nephrotoxic agents  --previous US renal with medical renal disease           Hematology   Coagulopathy    --suspect secondary to liver failure  --monitor for now        Oncology   Leukocytosis    --repeat blood cultures and check UA  --send C diff given diarrhea  --chest xray grossly unchanged, on room air.   --continue pip/tazo  --continues to be afebrile without hypotension        GI   * Alcoholic hepatitis without ascites    --appreciate hepatology's assistance  --continue supportive care for HE, LAURA  --imaging as detailed above.   --currently not transplant candidate     -MELD-Na score: 26 at 10/3/2017  3:54 AM  MELD score: 26 at 10/3/2017  3:54 AM  Calculated from:  Serum Creatinine: 0.9 mg/dL (Rounded to 1) at 10/3/2017  3:54 AM  Serum Sodium: 146 mmol/L (Rounded to 137) at 10/3/2017  3:54 AM  Total Bilirubin: 29.5 mg/dL at 10/3/2017  3:54 AM  INR(ratio): 1.8 at 10/3/2017  3:54 AM  Age: 27 years   --OSH workup: negative jae ab, ceruloplasmin, and sm muscle ab; hepatitis panel negative         Acute alcoholic pancreatitis    -- Mentioned at OSH, lipase not elevated here.         Decompensated hepatic cirrhosis      --see above        History of hematemesis    --Pt had an episode of hematemesis at OSH and EGD that revealed 1 small  varix, reflux esophagitis, and gastropathy of portal hypertension  --no evidence of bleeding currently.  --trend Hgbs        Hepatic encephalopathy    --continue lactulose, rifaxamin per NGT. Decrease lactulose to daily given multiple bowel movements.          Other   Anasarca    --2/2 liver disease and hypoalbuminemia  --plan as above           Critical Care Daily Checklist:    A: Awake: RASS Goal/Actual Goal: RASS Goal: 0-->alert and calm  Actual: Robert Agitation Sedation Scale (RASS): Alert and calm   B: Spontaneous Breathing Trial Performed?  n/a   C: SAT & SBT Coordinated?  n/a                   D: Delirium: CAM-ICU Overall CAM-ICU: Positive   E: Early Mobility Performed? Yes   F: Feeding Goal: Goals: Meet >80% of EEN/EPN  Status: Nutrition Goal Status: new   Current Diet Order   Procedures    Diet NPO Except for: Sips with Medication     Until abdo ultrasound is completed, then can start on sodium restricted/renal diet with 1L fluid restriction     Order Specific Question:   Except for     Answer:   Sips with Medication      AS: Analgesia/Sedation n/a   T: Thromboembolic Prophylaxis Heparin SQ   H: HOB > 300 Yes   U: Stress Ulcer Prophylaxis (if needed) famotidine   G: Glucose Control Within goal   B: Bowel Function Stool Occurrence: 1   I: Indwelling Catheter (Lines & Garcia) Necessity Midline  NG   D: De-escalation of Antimicrobials/Pharmacotherapies Continue pip/tazo    Plan for the day/ETD Supportive care    Code Status:  Family/Goals of Care: Full Code     Discussed plan with Dr. Sandra.  Mother updated at bedside.    I spent >70 minutes reviewing patient records, examining, and counseling the patient with greater than 50% of the time spent with direct patient care and coordination.     Eve Barba NP  Critical Care Medicine  [facility]

## 2017-10-04 PROBLEM — R00.0 TACHYCARDIA: Status: ACTIVE | Noted: 2017-10-04

## 2017-10-04 PROBLEM — E43 SEVERE MALNUTRITION: Status: ACTIVE | Noted: 2017-10-04

## 2017-10-04 LAB
ALBUMIN SERPL BCP-MCNC: 2.3 G/DL
ALP SERPL-CCNC: 141 U/L
ALT SERPL W/O P-5'-P-CCNC: 69 U/L
ANION GAP SERPL CALC-SCNC: 14 MMOL/L
ANISOCYTOSIS BLD QL SMEAR: SLIGHT
AST SERPL-CCNC: 116 U/L
BASOPHILS NFR BLD: 0 %
BILIRUB DIRECT SERPL-MCNC: >14 MG/DL
BILIRUB SERPL-MCNC: 31.3 MG/DL
BUN SERPL-MCNC: 32 MG/DL
CALCIUM SERPL-MCNC: 8.4 MG/DL
CHLORIDE SERPL-SCNC: 117 MMOL/L
CO2 SERPL-SCNC: 14 MMOL/L
CREAT SERPL-MCNC: 1.3 MG/DL
DIFFERENTIAL METHOD: ABNORMAL
EOSINOPHIL NFR BLD: 1 %
ERYTHROCYTE [DISTWIDTH] IN BLOOD BY AUTOMATED COUNT: 16.7 %
EST. GFR  (AFRICAN AMERICAN): >60 ML/MIN/1.73 M^2
EST. GFR  (NON AFRICAN AMERICAN): >60 ML/MIN/1.73 M^2
GLUCOSE SERPL-MCNC: 139 MG/DL
HCT VFR BLD AUTO: 33.2 %
HGB BLD-MCNC: 11.2 G/DL
HYPOCHROMIA BLD QL SMEAR: ABNORMAL
INR PPP: 1.7
LACTATE SERPL-SCNC: 1.9 MMOL/L
LYMPHOCYTES NFR BLD: 7 %
MCH RBC QN AUTO: 39 PG
MCHC RBC AUTO-ENTMCNC: 33.7 G/DL
MCV RBC AUTO: 116 FL
MONOCYTES NFR BLD: 4 %
NEUTROPHILS NFR BLD: 86 %
NEUTS BAND NFR BLD MANUAL: 2 %
PLATELET # BLD AUTO: 108 K/UL
PLATELET BLD QL SMEAR: ABNORMAL
PMV BLD AUTO: 10.9 FL
POIKILOCYTOSIS BLD QL SMEAR: SLIGHT
POLYCHROMASIA BLD QL SMEAR: ABNORMAL
POTASSIUM SERPL-SCNC: 3.4 MMOL/L
POTASSIUM SERPL-SCNC: 4 MMOL/L
PROT SERPL-MCNC: 5.8 G/DL
PROTHROMBIN TIME: 17 SEC
RBC # BLD AUTO: 2.87 M/UL
ROULEAUX BLD QL SMEAR: PRESENT
SODIUM SERPL-SCNC: 145 MMOL/L
WBC # BLD AUTO: 26 K/UL

## 2017-10-04 PROCEDURE — 85007 BL SMEAR W/DIFF WBC COUNT: CPT

## 2017-10-04 PROCEDURE — 97116 GAIT TRAINING THERAPY: CPT

## 2017-10-04 PROCEDURE — 85027 COMPLETE CBC AUTOMATED: CPT

## 2017-10-04 PROCEDURE — 83605 ASSAY OF LACTIC ACID: CPT

## 2017-10-04 PROCEDURE — 99232 SBSQ HOSP IP/OBS MODERATE 35: CPT | Mod: ,,, | Performed by: INTERNAL MEDICINE

## 2017-10-04 PROCEDURE — 20000000 HC ICU ROOM

## 2017-10-04 PROCEDURE — 97530 THERAPEUTIC ACTIVITIES: CPT

## 2017-10-04 PROCEDURE — 25000003 PHARM REV CODE 250: Performed by: INTERNAL MEDICINE

## 2017-10-04 PROCEDURE — 94761 N-INVAS EAR/PLS OXIMETRY MLT: CPT

## 2017-10-04 PROCEDURE — 80048 BASIC METABOLIC PNL TOTAL CA: CPT

## 2017-10-04 PROCEDURE — 63600175 PHARM REV CODE 636 W HCPCS

## 2017-10-04 PROCEDURE — 85610 PROTHROMBIN TIME: CPT

## 2017-10-04 PROCEDURE — 25000003 PHARM REV CODE 250: Performed by: NURSE PRACTITIONER

## 2017-10-04 PROCEDURE — 63600175 PHARM REV CODE 636 W HCPCS: Performed by: NURSE PRACTITIONER

## 2017-10-04 PROCEDURE — 99233 SBSQ HOSP IP/OBS HIGH 50: CPT | Mod: ,,, | Performed by: INTERNAL MEDICINE

## 2017-10-04 PROCEDURE — 80076 HEPATIC FUNCTION PANEL: CPT

## 2017-10-04 PROCEDURE — 99291 CRITICAL CARE FIRST HOUR: CPT | Mod: ,,, | Performed by: CLINICAL NURSE SPECIALIST

## 2017-10-04 PROCEDURE — 84132 ASSAY OF SERUM POTASSIUM: CPT

## 2017-10-04 RX ADMIN — PIPERACILLIN AND TAZOBACTAM 4.5 G: 4; .5 INJECTION, POWDER, FOR SOLUTION INTRAVENOUS at 11:10

## 2017-10-04 RX ADMIN — HEPARIN SODIUM 5000 UNITS: 5000 INJECTION, SOLUTION INTRAVENOUS; SUBCUTANEOUS at 05:10

## 2017-10-04 RX ADMIN — PIPERACILLIN AND TAZOBACTAM 4.5 G: 4; .5 INJECTION, POWDER, FOR SOLUTION INTRAVENOUS at 03:10

## 2017-10-04 RX ADMIN — PIPERACILLIN AND TAZOBACTAM 4.5 G: 4; .5 INJECTION, POWDER, FOR SOLUTION INTRAVENOUS at 08:10

## 2017-10-04 RX ADMIN — DEXTROSE MONOHYDRATE: 5 INJECTION, SOLUTION INTRAVENOUS at 05:10

## 2017-10-04 RX ADMIN — FAMOTIDINE 20 MG: 20 TABLET, FILM COATED ORAL at 09:10

## 2017-10-04 RX ADMIN — FAMOTIDINE 20 MG: 20 TABLET, FILM COATED ORAL at 08:10

## 2017-10-04 RX ADMIN — HEPARIN SODIUM 5000 UNITS: 5000 INJECTION, SOLUTION INTRAVENOUS; SUBCUTANEOUS at 11:10

## 2017-10-04 RX ADMIN — POTASSIUM CHLORIDE 40 MEQ: 20 SOLUTION ORAL at 11:10

## 2017-10-04 RX ADMIN — RIFAXIMIN 550 MG: 550 TABLET ORAL at 08:10

## 2017-10-04 RX ADMIN — RIFAXIMIN 550 MG: 550 TABLET ORAL at 09:10

## 2017-10-04 RX ADMIN — ONDANSETRON 4 MG: 2 INJECTION INTRAMUSCULAR; INTRAVENOUS at 03:10

## 2017-10-04 RX ADMIN — HEPARIN SODIUM 5000 UNITS: 5000 INJECTION, SOLUTION INTRAVENOUS; SUBCUTANEOUS at 02:10

## 2017-10-04 RX ADMIN — LACTULOSE 15 G: 20 SOLUTION ORAL at 09:10

## 2017-10-04 NOTE — PROGRESS NOTES
"[facility]  Hepatology  Progress Note    Patient Name: Jhonny Diana  MRN: [id number]  Admission Date: 9/27/2017  Hospital Length of Stay: 7 days  Attending Provider: Sajan Sandra MD   Primary Care Physician: Provider Notinsystem  Principal Problem:Alcoholic hepatitis without ascites    Subjective:     Transplant status: No    HPI: 26 yo male with hx of childhood asthma and heavy alcohol abuse (fifth of liquor daily since teenager, last drink 2.5 wks ago per mother) who was transferred to [facility] for higher level of care/liver transplant evaluation.    History obtained per chart review and discussion with mother: Patient initially presented to OSH for a 2 mo hx of progressively worsening abdominal pain with associated fatigue, jaundice, and abdominal distention with ~20# weight gain.     The patient's mother states he began having abdominal pain about 6 months ago.  States he underwent many tests, all of which were inconclusive.  He then began to have abdominal swelling about two months ago and was in&out of the local ER.  He was still actively drinking up until about 2.5 weeks ago according to the mother but she is unsure of this.  At the OSH the patient underwent an EGD which showed g1EV.  He was also diagnosed with "pancreatitis" based on an elevated lipase.  The patient was also placed on prednisone given concerns for alcoholic hepatitis.           Interval History:   Patient asking for water this Am.  TF are almost at goal and patient is more alert this morning.    Current Facility-Administered Medications   Medication    dextrose 5 % infusion    famotidine tablet 20 mg    heparin (porcine) injection 5,000 Units    lactulose 20 gram/30 mL solution Soln 15 g    ondansetron injection 4 mg    piperacillin-tazobactam 4.5 g in dextrose 5 % 100 mL IVPB (ready to mix system)    pneumoc 13-arianne conj-dip cr(PF) 0.5 mL    potassium chloride 10% solution 40 mEq    potassium chloride 10% " solution 40 mEq    potassium chloride 10% solution 60 mEq    potassium, sodium phosphates 280-160-250 mg packet 2 packet    potassium, sodium phosphates 280-160-250 mg packet 2 packet    potassium, sodium phosphates 280-160-250 mg packet 2 packet    rifAXIMin tablet 550 mg       Objective:     Vital Signs (Most Recent):  Temp: 98.1 °F (36.7 °C) (10/04/17 0300)  Pulse: 106 (10/04/17 0600)  Resp: (!) 24 (10/04/17 0600)  BP: 127/67 (10/04/17 0600)  SpO2: 100 % (10/04/17 0600) Vital Signs (24h Range):  Temp:  [98.1 °F (36.7 °C)-99.2 °F (37.3 °C)] 98.1 °F (36.7 °C)  Pulse:  [106-116] 106  Resp:  [15-32] 24  SpO2:  [97 %-100 %] 100 %  BP: (118-136)/(57-83) 127/67     Weight: 82.1 kg (181 lb) (10/04/17 0247)  Body mass index is 28.35 kg/m².    Physical Exam   Constitutional: No distress.   HENT:   Head: Normocephalic.   Eyes: Pupils are equal, round, and reactive to light. Scleral icterus is present.   Neck: Normal range of motion.   Cardiovascular:   Sinus tachycardia   Pulmonary/Chest: Effort normal and breath sounds normal.   Abdominal: Bowel sounds are normal. He exhibits distension. He exhibits no mass. There is no tenderness. There is no rebound and no guarding. No hernia.   Musculoskeletal: Normal range of motion. He exhibits no edema.   Neurological: He is alert.   Oriented to self and place   Skin: He is not diaphoretic.       MELD-Na score: 28 at 10/4/2017  5:34 AM  MELD score: 28 at 10/4/2017  5:34 AM  Calculated from:  Serum Creatinine: 1.3 mg/dL at 10/4/2017  5:34 AM  Serum Sodium: 145 mmol/L (Rounded to 137) at 10/4/2017  5:34 AM  Total Bilirubin: 31.3 mg/dL at 10/4/2017  5:34 AM  INR(ratio): 1.7 at 10/4/2017  5:34 AM  Age: 27 years    Significant Labs:  CBC:   Recent Labs  Lab 10/04/17  0247   WBC 26.00*   RBC 2.87*   HGB 11.2*   HCT 33.2*   *     CMP:   Recent Labs  Lab 10/04/17  0534   *   CALCIUM 8.4*   ALBUMIN 2.3*   PROT 5.8*      K 3.4*   CO2 14*   *   BUN 32*    CREATININE 1.3   ALKPHOS 141*   ALT 69*   *   BILITOT 31.3*     Coagulation:   Recent Labs  Lab 09/28/17  1727  10/04/17  0534   INR 1.8*  < > 1.7*   APTT 32.1*  --   --    < > = values in this interval not displayed.    Significant Imaging:  No recent studies to be reviewed    Assessment/Plan:     Decompensated hepatic cirrhosis    27 year old male with a history of alcohol abuse transferred from H for higher level of care. On admission his issues were alcoholic hepatitis, possible acute pancreatitis (4/5 BiSAP score, high mortality) MELD-Na score 34, acute renal injury, malnutrition, and debility.    Patient continues to improved as far as alertness/mentation and ability to work with Pt. Sodium is also improving. In regards, to his white count a clear source of infection has not been found and could be secondary to his liver dysfunction.    Recommendations:  -Continue Lactulose (can be titrated to ~ 3-5 BM per day) and Rifaximin  -Monitor for signs of infection, worsening WBC this AM but no clear source of infection with overall improvement in patients demeanor therefore likely secondary to his liver dysfunction.  -Addiction psych to visit patient today as he is more awake  -Obtain outside records from [facility] in [place] and the [facility].           Hypernatremia    Hypernatremia continuing to improve    Recommendations:  -Appreciate Nephro recs        Acute renal failure with tubular necrosis    Recommendations:  -Appreciate Nephrology recs            Thank you for your consult. I will follow-up with patient. Please contact us if you have any additional questions.    Divya Booker M.D.  Gastroenterology Fellow, PGY-IV  Pager: [phone]  [facility]

## 2017-10-04 NOTE — PROGRESS NOTES
"[facility]  Critical Care Medicine  Progress Note    Patient Name: Jhonny Diana  MRN: [id number]  Admission Date: 9/27/2017  Hospital Length of Stay: 7 days  Code Status: Full Code  Attending Provider: Sajan Sandra MD  Primary Care Provider: Provider Notinsystem   Principal Problem: Alcoholic hepatitis without ascites    Subjective:     HPI:  Mr. Diana is a 26 y/o male with hx of childhood asthma and heavy alcohol abuse (fifth of liquor daily since teenager, last drink 2.5 wks ago per mother) who was transferred to [facility] [place] [facility] for higher level of care/liver transplant evaluation. Patient initially presented to OSH for a 2 mo hx of progressively worsening abdominal pain with associated fatigue, jaundice, and abdominal distention with ~20# weight gain. HPI given by patient's mother and OSH records as patient's participation in interview limited by previous administration of pain medication at OSH.     Per mother, patient did not have a fever, chills, dark stools, known sick contacts, nausea, vomiting, diarrhea, confusion, or HA. Reports family hx of alcoholic cirrhosis in father. Pt was recently diagnosed with alcoholic hepatitis during his admission at OSH on 9/6-9/15. During that hospitalization, patient was given ceftriaxone for SBP ppx, prednisone for alcoholic hepatitis, and diuresed. Pt was discharged and instructed to follow up with PCP and AA within 1 wk. After his discharge, patient returned to ED 4 days after for increased weight gain and worsening abdominal pain/jaundice. At OSH, patient was given fluids (for suspected pancreatitis), diuresed and given IV albumin. Two days prior to transfer to [facility] [place], patient had an episode of hematemesis and underwent an EGD on 9/25 that revealed "1 sm varix in distal esophagus, no stigmata of bleeding, gastropathy of portal hypertension, and reflux esophagitis".  No further episodes of hematemesis noted since initial episode.       Recent labs at OSH: Na " 134, CO2 18, BUN 36, Cr 2, Tprot 5.9, albumin 1.9, Tbili 24, alk phos 83, , ALT51. Patient also had lipase 532 and ammonia 118 during his previous hospitalization at OSH. Labwork also notable for negative mitochondrial ab, sm muscle ab, and ceruloplasmin 22. Imaging at OSH revealing abdo U/S (9/20): hepatosplenomegaly, CT abdo w contrast: hepatomegaly with marked fatty infiltration of the liver and mild ascites (9/6). No CDs available for review.          Hospital/ICU Course:  Mr. Diana was admitted for decompensated alcoholic hepatitis with high MELD score, severe HE, and LAURA. Hepatology consulted and following to evaluate liver transplant candidacy. Patient not currently a transplant candidate due to recent alcohol use; recommending re-evaluation by addiction psych and social work when more medically stable and able to participate. Pt started on albumin and lasix for LAURA and possibility for HRS. However nephrology felt that LAURA is likely multifactorial: ATN given hypotensive episodes at OSH and toxic given severe hyperbilirubinemia; not truly HRS. Lactulose/rifaximin started for HE and mental status improving slowly. Mr. Diana has had significant ABD pain since admission with ABD distension. CT abdomen was significant for pericolonic fat stranding of right colon; no obstruction. Also with diffuse mesenteric edema. No significant ascites noted, no cholecystitis/cholelithiasis/ductal dilation. US liver with doppler showed appropriate vasculature.      Interval History/Significant Events: Patient's mental status appears to be somewhat improved today. Passed swallow study    Review of Systems   Unable to perform ROS: Mental status change     Objective:     Vital Signs (Most Recent):  Temp: 98.1 °F (36.7 °C) (10/04/17 0300)  Pulse: 106 (10/04/17 0600)  Resp: (!) 24 (10/04/17 0600)  BP: 127/67 (10/04/17 0600)  SpO2: 100 % (10/04/17 0600) Vital Signs (24h Range):  Temp:  [98.1 °F (36.7 °C)-99.2 °F (37.3 °C)] 98.1  °F (36.7 °C)  Pulse:  [106-116] 106  Resp:  [15-32] 24  SpO2:  [97 %-100 %] 100 %  BP: (118-136)/(57-83) 127/67   Weight: 82.1 kg (181 lb)  Body mass index is 28.35 kg/m².      Intake/Output Summary (Last 24 hours) at 10/04/17 1147  Last data filed at 10/04/17 0600   Gross per 24 hour   Intake             2450 ml   Output                0 ml   Net             2450 ml       Physical Exam   Constitutional: He appears well-developed. He is cooperative. He appears toxic.   HENT:   Head: Normocephalic.   Eyes: Pupils are equal, round, and reactive to light. Scleral icterus is present.   Neck: Normal range of motion.   Cardiovascular: Regular rhythm, normal heart sounds and normal pulses.  Tachycardia present.    Pulmonary/Chest: He has decreased breath sounds in the right lower field and the left lower field.   Abdominal: Normal appearance. He exhibits distension and ascites. Bowel sounds are decreased.   Musculoskeletal: Normal range of motion.   Neurological: He is alert. He has normal strength. GCS eye subscore is 4. GCS verbal subscore is 4. GCS motor subscore is 5.   Patient follows commands but needs restraints to prevent removal of NGT(removed x 2 in last 24hrs).   Also removed condom cath and does not urinate in a urinal  Reportedly more awake to day   Skin: Skin is warm and dry.   Old blister/ wart on right plantar surface of the foot near the big toe   Psychiatric: His affect is labile. His speech is delayed. Cognition and memory are impaired. He is inattentive.   Nursing note and vitals reviewed.      Vents:     Lines/Drains/Airways     Drain                 NG/OG Tube 09/30/17 1551 Weakley sump 14 Fr. Right nostril 3 days          Peripheral Intravenous Line                 Peripheral IV - Single Lumen Left Hand -- days         Midline Catheter Insertion/Assessment  - Single Lumen 09/27/17 1800 Right 6 days              Significant Labs:    CBC/Anemia Profile:    Recent Labs  Lab 10/03/17  0354 10/04/17  0017    WBC 24.04* 26.00*   HGB 10.4* 11.2*   HCT 30.4* 33.2*    108*   * 116*   RDW 15.3* 16.7*        Chemistries:    Recent Labs  Lab 10/02/17  1442 10/03/17  0008 10/03/17  0354 10/04/17  0534   *  --  146* 145   K 2.8* 3.0* 4.5 3.4*   *  --  118* 117*   CO2 18*  --  13* 14*   BUN 42*  --  36* 32*   CREATININE 1.6*  --  0.9 1.3   CALCIUM 9.1  --  8.5* 8.4*   ALBUMIN  --   --  2.4* 2.3*   PROT  --   --  6.3 5.8*   BILITOT  --   --  29.5* 31.3*   ALKPHOS  --   --  129 141*   ALT  --   --  69* 69*   AST  --   --  148* 116*   MG  --   --  2.2  --    PHOS  --   --  2.4*  --        All pertinent labs within the past 24 hours have been reviewed.    Significant Imaging:  I have reviewed and interpreted all pertinent imaging results/findings within the past 24 hours.    Assessment/Plan:     Psychiatric   Alcoholism /alcohol abuse    --reported about a fifth of whiskey daily since teenager  --appreciate psychiatry's and hepatology's assistance  --follow up PETH,  still no result today        Pulmonary   Pneumonia of both lower lobes due to infectious organism    --continue empiric zosyn, started 9/28  --on room air.  Chest xray grossly unchanged with bilateral patchy infiltrates        Renal/   Hypernatremia    --2/2 free water losses in stool  --improving with D5W infusion, continue at 100 ml/hour today.     --enteral water not given due to bowel edema.   --Trend BMPs        Metabolic acidosis with normal anion gap and bicarbonate losses    --2/2 diarrhea given lactulose  --will decrease frequency of lactulose as is making significant amount of stool   --continue to monitor         Increased anion gap metabolic acidosis    --Improving, continue to monitor  --suspect 2/2 uremia, lactate and beta hydroxybutyrate wnl.   --also with non anion gap acidosis due to bicarbonate losses from GI tract. See below             Acute renal failure with tubular necrosis    --appreciate nephrology's  assistance  --suspect LAURA multifactorial: ischemic ATN given hypotension and injury due to hyperbilirubinemia. Felt less likely to be HRS however have continued albumin with lasix  --overall, LAURA improving and making adequate urine  --trend BMP's, intake/output. Avoid nephrotoxic agents  --previous US renal with medical renal disease           Hematology   Coagulopathy    --suspect secondary to liver failure  --monitor for now        Oncology   Leukocytosis    -- WBCs increasing 22>24>26 with 2% bands today  -- Cx- NGTD  -- C diff negative  -- chest xray grossly unchanged, on room air.   -- continue pip/tazo  --T max 99  -- Procal 0.87 yesterday        Endocrine   Severe malnutrition    Patient is receiving tubes feeds and is tolerating well  -- Hepatology would like to continue tube feeds   -- Awake and passed swallow study but he will need to closely monitor mental status when providing oral nutiriton  -- ICU chips and water today    Appreciate Nutition recommendation/Intervention:   -- TF formula -Isosource 1.5 @ a goal rate of 60ml/hr    -- Adv diet to 2gm Na once safe for PO intake                 GI   * Alcoholic hepatitis without ascites    --appreciate hepatology's assistance  -- considering for transplant candidate   --continue supportive care for HE, LAURA     -MELD-Na score: 28 at 10/4/2017  5:34 AM  MELD score: 28 at 10/4/2017  5:34 AM  Calculated from:  Serum Creatinine: 1.3 mg/dL at 10/4/2017  5:34 AM  Serum Sodium: 145 mmol/L (Rounded to 137) at 10/4/2017  5:34 AM  Total Bilirubin: 31.3 mg/dL at 10/4/2017  5:34 AM  INR(ratio): 1.7 at 10/4/2017  5:34 AM  Age: 27 years   --OSH workup: negative jae ab, ceruloplasmin, and sm muscle ab; hepatitis panel negative         Acute alcoholic pancreatitis    -- Mentioned at OSH, lipase not elevated here.         Decompensated hepatic cirrhosis      --see above  MELD-Na score: 28 at 10/4/2017  5:34 AM  MELD score: 28 at 10/4/2017  5:34 AM  Calculated from:  Serum  Creatinine: 1.3 mg/dL at 10/4/2017  5:34 AM  Serum Sodium: 145 mmol/L (Rounded to 137) at 10/4/2017  5:34 AM  Total Bilirubin: 31.3 mg/dL at 10/4/2017  5:34 AM  INR(ratio): 1.7 at 10/4/2017  5:34 AM  Age: 27 years        History of hematemesis    --Pt had an episode of hematemesis at OSH and EGD that revealed 1 small varix, reflux esophagitis, and gastropathy of portal hypertension  --no evidence of bleeding currently.  --trend Hgbs        Hepatic encephalopathy    --continue lactulose, rifaxamin per NGT. Decrease lactulose to daily given multiple bowel movements.          Other   Anasarca    Improving per family  -- 2/2 liver disease and hypoalbuminemia  --plan as above           Critical Care Daily Checklist:    A: Awake: RASS Goal/Actual Goal: RASS Goal: 0-->alert and calm  Actual: Robert Agitation Sedation Scale (RASS): Alert and calm   B: Spontaneous Breathing Trial Performed?     C: SAT & SBT Coordinated?  NA                   D: Delirium: CAM-ICU Overall CAM-ICU: Positive   E: Early Mobility Performed? Yes   F: Feeding Goal: Goals: Meet >80% of EEN/EPN  Status: Nutrition Goal Status: new   Current Diet Order   Procedures    Diet NPO Except for: Sips with Medication     Until abdo ultrasound is completed, then can start on sodium restricted/renal diet with 1L fluid restriction     Order Specific Question:   Except for     Answer:   Sips with Medication      AS: Analgesia/Sedation N   T: Thromboembolic Prophylaxis SQ Heparin   H: HOB > 300 Yes   U: Stress Ulcer Prophylaxis (if needed) Pepcid   G: Glucose Control NA   B: Bowel Function Stool Occurrence: 1   I: Indwelling Catheter (Lines & Garcia) Necessity  Midline 9/27   D: De-escalation of Antimicrobials/Pharmacotherapies Continue    Plan for the day/ETD Supportive care    Code Status:  Family/Goals of Care: Full Code  Mother updated at bedside     Critical Care Time: 45minutes  Critical secondary to Patient has a condition that poses threat to life and  bodily function: Acute Liver Failure      Critical care was time spent personally by me on the following activities: development of treatment plan with patient or surrogate and bedside caregivers, discussions with consultants, evaluation of patient's response to treatment, examination of patient, ordering and performing treatments and interventions, ordering and review of laboratory studies, ordering and review of radiographic studies, pulse oximetry, re-evaluation of patient's condition. This critical care time did not overlap with that of any other provider or involve time for any procedures.     Fiona Winterbottom, APRN, CNS  Critical Care Medicine  [facility]

## 2017-10-04 NOTE — PHYSICIAN QUERY
PT Name: Jhonny Diana  MR #: [id number]     Physician Query Form - Documentation Clarification      CDS/: Lita Gamboa RN, CCDS               Contact information:  [email or web address]      This form is a permanent document in the medical record.     Query Date: October 4, 2017    By submitting this query, we are merely seeking further clarification of documentation. Please utilize your independent clinical judgment when addressing the question(s) below.    The Medical record reflects the following:    Supporting Clinical Findings Location in Medical Record   Alcoholic hepatitis. Consult hepatology. Appreciate their input; recommendation not to administer corticosteroids in this case.  Abdominal pain & distension. Severe bowel edema & bowel dysfunction related to portal hypertension. There is insufficient ascites to safely sample.     - CT scan showing dilated colon, minimal ascites, no obstruction  - recommend paracentesis if able     Principal Problem:Alcoholic hepatitis without ascites  Abdominal: He exhibits distension and ascites. There is tenderness.   -He also has tense ascites, bladder pressures ordered with elevation of 33.      Alcoholic hepatitis    has had significant ABD pain since admission with ABD distension. CT abdomen was significant for pericolonic fat stranding of right colon; no obstruction. Also with diffuse mesenteric edema. No significant ascites noted, no cholecystitis/cholelithiasis/ductal dilation.     Abdominal: Soft. Bowel sounds are normal. He exhibits distension. There is no tenderness. There is no rebound and no guarding.     Alcoholic hepatitis without ascites  --appreciate hepatology's assistance  --continue supportive care for HE, LAURA  --imaging as detailed above.   --currently not transplant candidate  H & P 09/28          General Surgery consult 09/28      Nephrology consult 09/28        Critical Care Medicine progress note 10/03 3:00 PM    Critical Care Medicine progress note  10/03 3:18 PM          Critical Care Medicine progress note 10/03 3:18 PM      Critical Care Medicine progress note 10/03 3:18 PM                                                                                              Doctor, Please specify diagnosis or diagnoses associated with above clinical findings.  Please further clarify alcoholic hepatitis.    Provider Use Only    [  x ] Without ascites    [   ] With ascites    [   ] Other (specify) _________________                                                                                                                   [  ] Clinically undetermined

## 2017-10-04 NOTE — ASSESSMENT & PLAN NOTE
-- WBCs increasing 22>24>26 with 2% bands today  -- Cx- NGTD  -- C diff negative  -- chest xray grossly unchanged, on room air.   -- continue pip/tazo  --T max 99  -- Procal 0.87 yesterday

## 2017-10-04 NOTE — ASSESSMENT & PLAN NOTE
27 year old male with a history of alcohol abuse transferred from OSH for higher level of care. On admission his issues were alcoholic hepatitis, possible acute pancreatitis (4/5 BiSAP score, high mortality) MELD-Na score 34, acute renal injury, malnutrition, and debility.    Patient continues to improved as far as alertness/mentation and ability to work with Pt. Sodium is also improving. In regards, to his white count a clear source of infection has not been found and could be secondary to his liver dysfunction.    Recommendations:  -Continue Lactulose (can be titrated to ~ 3-5 BM per day) and Rifaximin  -Monitor for signs of infection, worsening WBC this AM but no clear source of infection with overall improvement in patients demeanor therefore likely secondary to his liver dysfunction.  -Addiction psych to visit patient today as he is more awake  -Obtain outside records from [facility] in [place] and the [facility].

## 2017-10-04 NOTE — ASSESSMENT & PLAN NOTE
--appreciate hepatology's assistance  -- considering for transplant candidate   --continue supportive care for HE, LAURA     -MELD-Na score: 28 at 10/4/2017  5:34 AM  MELD score: 28 at 10/4/2017  5:34 AM  Calculated from:  Serum Creatinine: 1.3 mg/dL at 10/4/2017  5:34 AM  Serum Sodium: 145 mmol/L (Rounded to 137) at 10/4/2017  5:34 AM  Total Bilirubin: 31.3 mg/dL at 10/4/2017  5:34 AM  INR(ratio): 1.7 at 10/4/2017  5:34 AM  Age: 27 years   --OSH workup: negative jae ab, ceruloplasmin, and sm muscle ab; hepatitis panel negative

## 2017-10-04 NOTE — SUBJECTIVE & OBJECTIVE
Interval History:   Patient asking for water this Am.  TF are almost at goal and patient is more alert this morning.    Current Facility-Administered Medications   Medication    dextrose 5 % infusion    famotidine tablet 20 mg    heparin (porcine) injection 5,000 Units    lactulose 20 gram/30 mL solution Soln 15 g    ondansetron injection 4 mg    piperacillin-tazobactam 4.5 g in dextrose 5 % 100 mL IVPB (ready to mix system)    pneumoc 13-arianne conj-dip cr(PF) 0.5 mL    potassium chloride 10% solution 40 mEq    potassium chloride 10% solution 40 mEq    potassium chloride 10% solution 60 mEq    potassium, sodium phosphates 280-160-250 mg packet 2 packet    potassium, sodium phosphates 280-160-250 mg packet 2 packet    potassium, sodium phosphates 280-160-250 mg packet 2 packet    rifAXIMin tablet 550 mg       Objective:     Vital Signs (Most Recent):  Temp: 98.1 °F (36.7 °C) (10/04/17 0300)  Pulse: 106 (10/04/17 0600)  Resp: (!) 24 (10/04/17 0600)  BP: 127/67 (10/04/17 0600)  SpO2: 100 % (10/04/17 0600) Vital Signs (24h Range):  Temp:  [98.1 °F (36.7 °C)-99.2 °F (37.3 °C)] 98.1 °F (36.7 °C)  Pulse:  [106-116] 106  Resp:  [15-32] 24  SpO2:  [97 %-100 %] 100 %  BP: (118-136)/(57-83) 127/67     Weight: 82.1 kg (181 lb) (10/04/17 0247)  Body mass index is 28.35 kg/m².    Physical Exam   Constitutional: No distress.   HENT:   Head: Normocephalic.   Eyes: Pupils are equal, round, and reactive to light. Scleral icterus is present.   Neck: Normal range of motion.   Cardiovascular:   Sinus tachycardia   Pulmonary/Chest: Effort normal and breath sounds normal.   Abdominal: Bowel sounds are normal. He exhibits distension. He exhibits no mass. There is no tenderness. There is no rebound and no guarding. No hernia.   Musculoskeletal: Normal range of motion. He exhibits no edema.   Neurological: He is alert.   Oriented to self and place   Skin: He is not diaphoretic.       MELD-Na score: 28 at 10/4/2017  5:34 AM  MELD  score: 28 at 10/4/2017  5:34 AM  Calculated from:  Serum Creatinine: 1.3 mg/dL at 10/4/2017  5:34 AM  Serum Sodium: 145 mmol/L (Rounded to 137) at 10/4/2017  5:34 AM  Total Bilirubin: 31.3 mg/dL at 10/4/2017  5:34 AM  INR(ratio): 1.7 at 10/4/2017  5:34 AM  Age: 27 years    Significant Labs:  CBC:   Recent Labs  Lab 10/04/17  0247   WBC 26.00*   RBC 2.87*   HGB 11.2*   HCT 33.2*   *     CMP:   Recent Labs  Lab 10/04/17  0534   *   CALCIUM 8.4*   ALBUMIN 2.3*   PROT 5.8*      K 3.4*   CO2 14*   *   BUN 32*   CREATININE 1.3   ALKPHOS 141*   ALT 69*   *   BILITOT 31.3*     Coagulation:   Recent Labs  Lab 09/28/17  1727  10/04/17  0534   INR 1.8*  < > 1.7*   APTT 32.1*  --   --    < > = values in this interval not displayed.    Significant Imaging:  No recent studies to be reviewed

## 2017-10-04 NOTE — HPI
Case of 26 y/o male PMHx BA and alcohol abuse until admition who was admitted on 9/27/17. Was transferred from [facility] due to 6-8 weeks progressively worsening fatigue, jaundice, abdominal pain, distension, BLE edema and 20lbs weight gain due to decompensated liver failure secondary to alcoholic cirrhosis recently diagnosed. ID originally consulted for evaluation of worsening leukocytosis and transplant candidacy. Patient had an extensive infectious work up with no localizing source. Patient received 7 day course of Zosyn for pneumonia. Ascitic fluid not consistent with SBP, negative urine cultures, CXR improved from past. Patient received immunization ordered for transplant evaluation on 10/11/17 and 10/12/13. Had low grade fever of 100.3 and was reconsulted to ID at this time for additional recommendations regarding ESBL in sputum.

## 2017-10-04 NOTE — CONSULTS
"[facility]  Psychiatry  Addiction Psychiatry Consult Progress Note    Patient Name: Jhonny Diana  MRN: [id number]   Code Status: Full Code  Admission Date: 9/27/2017  Hospital Length of Stay: 7 days  Attending Physician: Sajan Sandra MD  Primary Care Provider: Provider [facility]    Current Legal Status: N/A    Patient information was obtained from patient and parent.   Consults  Subjective:     Principal Problem:Alcoholic hepatitis without ascites    Chief Complaint:  Alcohol Use Disorder     HPI: Per H&P: 26 yo male with hx of childhood asthma and heavy alcohol abuse (fifth of liquor daily since teenager, last drink 2.5 wks ago per mother) who was transferred to [facility] [place] for higher level of care/liver transplant evaluation. Patient initially presented to OSH for a 2 mo hx of progressively worsening abdominal pain with associated fatigue, jaundice, and abdominal distention with ~20# weight gain. HPI given by patient's mother and OSH records as patient's participation in interview limited by previous administration of pain medication at OSH.     On interview: Pt jaundiced and acutely disoriented, unable to attend to interview, only moans in response to basic questioning. Obtained some collateral information per pt's mother and Aunt, who are present in the room. They state that pt has been drinking one fifth of whiskey since his teenage years, and they believe that he initially got started to treat "social anxiety." Pt has been seen by multiple psychiatrists in the past, all of which recommended alcohol rehab/cessation prior to addressing other psychiatric issues. Pt recently received diagnosis of alcoholic hepatitis earlier this month. Pt has not had significant periods of sobriety in the past. Has never been to residential rehab or participated in IOP. Pt went to AA for brief periods in the past but stopped because it was "too depressing."     Hospital Course: Interval History 10/4: Patient " visited this morning.  He was oriented to place but not to situation, self, or date.  He displayed trouble with word finding.  Perseverated on the removal of his NG tube.  Informed patient that he was improving but not ready to complete a psychiatric interview at this time.  Patient did not display understanding.          Patient History           Medical as of 10/4/2017     Past Medical History     Diagnosis Date Comments Source    Hypertension -- -- Provider                  Surgical as of 10/4/2017     Past Surgical History     Procedure Laterality Date Comments Source    APPENDECTOMY -- -- -- Provider                  Family as of 10/4/2017    **None**           Tobacco Use as of 10/4/2017     Smoking Status Smoking Start Date Smoking Quit Date Packs/day Years Used    Former Smoker -- -- -- --    Types Comments Smokeless Tobacco Status Smokeless Tobacco Quit Date Source     Cigarettes -- Never Used -- Provider            Alcohol Use as of 10/4/2017     Alcohol Use Drinks/Week Alcohol/Week Comments Source    Yes -- -- a fifth of liquor daily for years, cut back over last 2 months Provider            Drug Use as of 10/4/2017     Drug Use Types Frequency Comments Source    No -- -- -- Provider            Sexual Activity as of 10/4/2017     Sexually Active Birth Control Partners Comments Source    -- -- -- -- Provider            Activities of Daily Living as of 10/4/2017    **None**           Social Documentation as of 10/4/2017    **None**           Occupational as of 10/4/2017    **None**           Socioeconomic as of 10/4/2017     Marital Status Spouse Name Number of Children Years Education Preferred Language Ethnicity Race Source    Single -- -- -- English /White White --         Pertinent History Q A Comments    as of 10/4/2017 Lives with      Place in Birth Order      Lives in      Number of Siblings      Raised by      Legal Involvement      Childhood Trauma      Criminal History of      Financial  "Status      Highest Level of Education      Does patient have access to a firearm?       Service      Primary Leisure Activity      Spirituality         Review of patient's allergies indicates:   Allergen Reactions    Bactrim [sulfamethoxazole-trimethoprim] Other (See Comments)     Mookie Trell Syndrome       No current facility-administered medications on file prior to encounter.      No current outpatient prescriptions on file prior to encounter.     Psychotherapeutics     None        Review of Systems  Objective:     Vital Signs (Most Recent):  Temp: 98.1 °F (36.7 °C) (10/04/17 0300)  Pulse: 106 (10/04/17 0600)  Resp: (!) 24 (10/04/17 0600)  BP: 127/67 (10/04/17 0600)  SpO2: 100 % (10/04/17 0600) Vital Signs (24h Range):  Temp:  [98.1 °F (36.7 °C)-99.8 °F (37.7 °C)] 98.1 °F (36.7 °C)  Pulse:  [106-116] 106  Resp:  [15-32] 24  SpO2:  [97 %-100 %] 100 %  BP: (118-136)/(57-83) 127/67     Height: 5' 7" (170.2 cm)  Weight: 82.1 kg (181 lb)  Body mass index is 28.35 kg/m².      Intake/Output Summary (Last 24 hours) at 10/04/17 1017  Last data filed at 10/04/17 0600   Gross per 24 hour   Intake             2720 ml   Output                0 ml   Net             2720 ml       Physical Exam  Mental Status Exam:  Appearance: disheveled, malnourished, lying in bed, jaundiced  Behavior/Cooperation: limited/ appropriate restless and fidgety , eye contact minimal, easily distracted  Speech: appropriate rate, volume and tone articulation error, increased latency of response  Language: uses words appropriately; NO aphasia or dysarthria  Mood: irritable  Affect:  congruent with mood and appropriate to situation/content   Thought Process: tangential  Thought Content: normal, no suicidality, no homicidality, delusions, or paranoia  Level of Consciousness: Alert, oriented to place, was not oriented to time, self, situation   Memory:  Grossly impaired  Attention/concentration: easily distractable   Fund of Knowledge: unable " to assess  Insight:  Impaired, patient is not oriented to situation, does not understand why he is seeing psychiatry or why he is in an ICU  Judgment: Impaired, patient requesting PO liquids and removal of NG tube despite repeated counseling on its necessity at this time         Significant Labs:   Last 24 Hours:   Recent Lab Results       10/04/17  0534 10/04/17  0247 10/03/17  1148 10/03/17  1052 10/03/17  1035      Procalcitonin    0.87  Comment:  A concentration < 0.25 ng/mL represents a low risk bacterial   infection.  Procalcitonin may not be accurate among patients with localized   infection, recent trauma or major surgery, immunosuppressed state,   invasive fungal infection, renal dysfunction. Decisions regarding   initiation or continuation of antibiotic therapy should not be based   solely on procalcitonin levels.  (H)      Albumin 2.3(L)         Alkaline Phosphatase 141(H)         ALT 69(H)         Anion Gap 14         Aniso  Slight        (H)         BANDS  2.0        Basophil%  0.0        Bilirubin, Direct >14.0(H)         Total Bilirubin 31.3  Comment:  For infants and newborns, interpretation of results should be based  on gestational age, weight and in agreement with clinical  observations.  Premature Infant recommended reference ranges:  Up to 24 hours.............<8.0 mg/dL  Up to 48 hours............<12.0 mg/dL  3-5 days..................<15.0 mg/dL  6-29 days.................<15.0 mg/dL  (H)         Blood Culture, Routine     No Growth to date[P]     BUN, Bld 32(H)         C difficile Toxins A+B, EIA   Negative  Comment:  Testing not recommended for children <24 months old.       C. diff Antigen   Negative       Calcium 8.4(L)         Chloride 117(H)         CO2 14(L)         Creatinine 1.3         Differential Method  Manual        eGFR if  >60.0         eGFR if non  >60.0  Comment:  Calculation used to obtain the estimated glomerular filtration  rate  (eGFR) is the CKD-EPI equation. Since race is unknown   in our information system, the eGFR values for   -American and Non--American patients are given   for each creatinine result.           Eosinophil%  1.0        Glucose 139(H)         Gran%  86.0(H)        Hematocrit  33.2(L)        Hemoglobin  11.2(L)        Hypo  Occasional        Coumadin Monitoring INR 1.7  Comment:  Coumadin Therapy:  2.0 - 3.0 for INR for all indicators except mechanical heart valves  and antiphospholipid syndromes which should use 2.5 - 3.5.  (H)         Lymph%  7.0(L)        MCH  39.0(H)        MCHC  33.7        MCV  116(H)        Mono%  4.0        MPV  10.9        Platelet Estimate  Decreased(A)        Platelets  108(L)        Poik  Slight        Poly  Occasional        Potassium 3.4(L)         Total Protein 5.8(L)         Protime 17.0(H)         RBC  2.87(L)        RDW  16.7(H)        Rouleaux  Present(A)        Sodium 145         WBC  26.00(H)                        Significant Imaging: None    Assessment/Plan:     * Alcoholic hepatitis without ascites    - Unable to perform risk assessment at this time due to pt's current mental status  - In the event that liver transplant is performed emergently during this hospitalization, we will provide recommendations for rehab/sobriety on the back end  - Will continue to follow and provide recommendations once pt's mentation improves                 Monisha Ortiz MD  [facility] PGY2  [phone]

## 2017-10-04 NOTE — ASSESSMENT & PLAN NOTE
--reported about a fifth of whiskey daily since teenager  --appreciate psychiatry's and hepatology's assistance  --follow up PETH,  still no result today

## 2017-10-04 NOTE — SUBJECTIVE & OBJECTIVE
Past Medical History:   Diagnosis Date    Hypertension        Past Surgical History:   Procedure Laterality Date    APPENDECTOMY         Review of patient's allergies indicates:   Allergen Reactions    Bactrim [sulfamethoxazole-trimethoprim] Other (See Comments)     Mookie Trell Syndrome       Medications:  No prescriptions prior to admission.     Antibiotics     Start     Stop Route Frequency Ordered    10/03/17 1100  piperacillin-tazobactam 4.5 g in dextrose 5 % 100 mL IVPB (ready to mix system)      10/06 0259 IV Every 8 hours (non-standard times) 10/03/17 0757    09/30/17 2100  rifAXIMin tablet 550 mg      -- Oral 2 times daily 09/30/17 1452        Antifungals     None        Antivirals     None           There is no immunization history for the selected administration types on file for this patient.    Family History     None        Social History     Social History    Marital status: Single     Spouse name: N/A    Number of children: N/A    Years of education: N/A     Social History Main Topics    Smoking status: Former Smoker     Types: Cigarettes    Smokeless tobacco: Never Used    Alcohol use Yes      Comment: a fifth of liquor daily for years, cut back over last 2 months    Drug use: No    Sexual activity: Not Asked     Other Topics Concern    None     Social History Narrative    None     Review of Systems   Unable to perform ROS: Mental status change     Objective:     Vital Signs (Most Recent):  Temp: 98.2 °F (36.8 °C) (10/04/17 1100)  Pulse: 108 (10/04/17 1400)  Resp: 18 (10/04/17 1400)  BP: 137/79 (10/04/17 1400)  SpO2: 99 % (10/04/17 1400) Vital Signs (24h Range):  Temp:  [98.1 °F (36.7 °C)-98.6 °F (37 °C)] 98.2 °F (36.8 °C)  Pulse:  [103-116] 108  Resp:  [16-32] 18  SpO2:  [97 %-100 %] 99 %  BP: (118-164)/(57-79) 137/79     Weight: 82.1 kg (181 lb)  Body mass index is 28.35 kg/m².    Estimated Creatinine Clearance: 86.8 mL/min (based on SCr of 1.3 mg/dL).    Physical Exam    Constitutional: He appears well-developed. He is cooperative.  Non-toxic appearance.   HENT:   Head: Normocephalic.   Eyes: Pupils are equal, round, and reactive to light. Scleral icterus is present.   Neck: Normal range of motion.   Cardiovascular: Regular rhythm, normal heart sounds and normal pulses.  Tachycardia present.    Pulmonary/Chest: He has decreased breath sounds in the right lower field and the left lower field.   Abdominal: Normal appearance. He exhibits distension. Bowel sounds are decreased.   Musculoskeletal: Normal range of motion.   Neurological: He is disoriented.   Patient follows commands but needs restraints to prevent removal of NGT(removed x 2 in last 24hrs).   Also removed condom cath and does not urinate in a urinal  Reportedly more awake to day   Skin: Skin is warm and dry.   Old blister/ wart on right plantar surface of the foot near the big toe   Psychiatric: His affect is labile. His speech is delayed. Cognition and memory are impaired. He is inattentive.   Nursing note and vitals reviewed.      Significant Labs:   CBC:   Recent Labs  Lab 10/03/17  0354 10/04/17  0247   WBC 24.04* 26.00*   HGB 10.4* 11.2*   HCT 30.4* 33.2*    108*     CMP:   Recent Labs  Lab 10/03/17  0008 10/03/17  0354 10/04/17  0534   NA  --  146* 145   K 3.0* 4.5 3.4*   CL  --  118* 117*   CO2  --  13* 14*   GLU  --  141* 139*   BUN  --  36* 32*   CREATININE  --  0.9 1.3   CALCIUM  --  8.5* 8.4*   PROT  --  6.3 5.8*   ALBUMIN  --  2.4* 2.3*   BILITOT  --  29.5* 31.3*   ALKPHOS  --  129 141*   AST  --  148* 116*   ALT  --  69* 69*   ANIONGAP  --  15 14   EGFRNONAA  --  >60.0 >60.0       Significant Imaging: I have reviewed all pertinent imaging results/findings within the past 24 hours.     CT CAP: Hepatomegaly.      Small volume ascites, mesenteric edema, portal colopathy, anasarca and mild splenomegaly suggesting sequela of portal hypertension.      Patchy confluent groundglass opacities throughout both  lower lungs, probably edema, aspiration, or pneumonia.    Prominent loops of small bowel containing fecal material mixed with scattered gas density suggesting slow transit.    Microbiology:  9/28 blood cx: negative   9/28 urine cx: negative  10/3 blood cx: negative  10/3 C.diff negative

## 2017-10-04 NOTE — SUBJECTIVE & OBJECTIVE
Interval History/Significant Events: Patient's mental status appears to be somewhat improved today. Passed swallow study    Review of Systems   Unable to perform ROS: Mental status change     Objective:     Vital Signs (Most Recent):  Temp: 98.1 °F (36.7 °C) (10/04/17 0300)  Pulse: 106 (10/04/17 0600)  Resp: (!) 24 (10/04/17 0600)  BP: 127/67 (10/04/17 0600)  SpO2: 100 % (10/04/17 0600) Vital Signs (24h Range):  Temp:  [98.1 °F (36.7 °C)-99.2 °F (37.3 °C)] 98.1 °F (36.7 °C)  Pulse:  [106-116] 106  Resp:  [15-32] 24  SpO2:  [97 %-100 %] 100 %  BP: (118-136)/(57-83) 127/67   Weight: 82.1 kg (181 lb)  Body mass index is 28.35 kg/m².      Intake/Output Summary (Last 24 hours) at 10/04/17 1147  Last data filed at 10/04/17 0600   Gross per 24 hour   Intake             2450 ml   Output                0 ml   Net             2450 ml       Physical Exam   Constitutional: He appears well-developed. He is cooperative. He appears toxic.   HENT:   Head: Normocephalic.   Eyes: Pupils are equal, round, and reactive to light. Scleral icterus is present.   Neck: Normal range of motion.   Cardiovascular: Regular rhythm, normal heart sounds and normal pulses.  Tachycardia present.    Pulmonary/Chest: He has decreased breath sounds in the right lower field and the left lower field.   Abdominal: Normal appearance. He exhibits distension and ascites. Bowel sounds are decreased.   Musculoskeletal: Normal range of motion.   Neurological: He is alert. He has normal strength. GCS eye subscore is 4. GCS verbal subscore is 4. GCS motor subscore is 5.   Patient follows commands but needs restraints to prevent removal of NGT(removed x 2 in last 24hrs).   Also removed condom cath and does not urinate in a urinal  Reportedly more awake to day   Skin: Skin is warm and dry.   Old blister/ wart on right plantar surface of the foot near the big toe   Psychiatric: His affect is labile. His speech is delayed. Cognition and memory are impaired. He is  inattentive.   Nursing note and vitals reviewed.      Vents:     Lines/Drains/Airways     Drain                 NG/OG Tube 09/30/17 1551 Pelon sump 14 Fr. Right nostril 3 days          Peripheral Intravenous Line                 Peripheral IV - Single Lumen Left Hand -- days         Midline Catheter Insertion/Assessment  - Single Lumen 09/27/17 1800 Right 6 days              Significant Labs:    CBC/Anemia Profile:    Recent Labs  Lab 10/03/17  0354 10/04/17  0247   WBC 24.04* 26.00*   HGB 10.4* 11.2*   HCT 30.4* 33.2*    108*   * 116*   RDW 15.3* 16.7*        Chemistries:    Recent Labs  Lab 10/02/17  1442 10/03/17  0008 10/03/17  0354 10/04/17  0534   *  --  146* 145   K 2.8* 3.0* 4.5 3.4*   *  --  118* 117*   CO2 18*  --  13* 14*   BUN 42*  --  36* 32*   CREATININE 1.6*  --  0.9 1.3   CALCIUM 9.1  --  8.5* 8.4*   ALBUMIN  --   --  2.4* 2.3*   PROT  --   --  6.3 5.8*   BILITOT  --   --  29.5* 31.3*   ALKPHOS  --   --  129 141*   ALT  --   --  69* 69*   AST  --   --  148* 116*   MG  --   --  2.2  --    PHOS  --   --  2.4*  --        All pertinent labs within the past 24 hours have been reviewed.    Significant Imaging:  I have reviewed and interpreted all pertinent imaging results/findings within the past 24 hours.

## 2017-10-04 NOTE — ASSESSMENT & PLAN NOTE
--Improving, continue to monitor  --suspect 2/2 uremia, lactate and beta hydroxybutyrate wnl.   --also with non anion gap acidosis due to bicarbonate losses from GI tract. See below

## 2017-10-04 NOTE — ASSESSMENT & PLAN NOTE
--2/2 free water losses in stool  --improving with D5W infusion, continue at 100 ml/hour today.     --enteral water not given due to bowel edema.   --Trend BMPs

## 2017-10-04 NOTE — ASSESSMENT & PLAN NOTE
Patient is receiving tubes feeds and is tolerating well  -- Hepatology would like to continue tube feeds   -- Awake and passed swallow study but he will need to closely monitor mental status when providing oral nutiriton  -- ICU chips and water today    Appreciate Nutition recommendation/Intervention:   -- TF formula -Isosource 1.5 @ a goal rate of 60ml/hr    -- Adv diet to 2gm Na once safe for PO intake

## 2017-10-04 NOTE — SUBJECTIVE & OBJECTIVE
Patient History           Medical as of 10/4/2017     Past Medical History     Diagnosis Date Comments Source    Hypertension -- -- Provider                  Surgical as of 10/4/2017     Past Surgical History     Procedure Laterality Date Comments Source    APPENDECTOMY -- -- -- Provider                  Family as of 10/4/2017    **None**           Tobacco Use as of 10/4/2017     Smoking Status Smoking Start Date Smoking Quit Date Packs/day Years Used    Former Smoker -- -- -- --    Types Comments Smokeless Tobacco Status Smokeless Tobacco Quit Date Source     Cigarettes -- Never Used -- Provider            Alcohol Use as of 10/4/2017     Alcohol Use Drinks/Week Alcohol/Week Comments Source    Yes -- -- a fifth of liquor daily for years, cut back over last 2 months Provider            Drug Use as of 10/4/2017     Drug Use Types Frequency Comments Source    No -- -- -- Provider            Sexual Activity as of 10/4/2017     Sexually Active Birth Control Partners Comments Source    -- -- -- -- Provider            Activities of Daily Living as of 10/4/2017    **None**           Social Documentation as of 10/4/2017    **None**           Occupational as of 10/4/2017    **None**           Socioeconomic as of 10/4/2017     Marital Status Spouse Name Number of Children Years Education Preferred Language Ethnicity Race Source    Single -- -- -- English /White White --         Pertinent History Q A Comments    as of 10/4/2017 Lives with      Place in Birth Order      Lives in      Number of Siblings      Raised by      Legal Involvement      Childhood Trauma      Criminal History of      Financial Status      Highest Level of Education      Does patient have access to a firearm?       Service      Primary Leisure Activity      Spirituality         Review of patient's allergies indicates:   Allergen Reactions    Bactrim [sulfamethoxazole-trimethoprim] Other (See Comments)     Mookie Trell Syndrome  "      No current facility-administered medications on file prior to encounter.      No current outpatient prescriptions on file prior to encounter.     Psychotherapeutics     None        Review of Systems  Objective:     Vital Signs (Most Recent):  Temp: 98.1 °F (36.7 °C) (10/04/17 0300)  Pulse: 106 (10/04/17 0600)  Resp: (!) 24 (10/04/17 0600)  BP: 127/67 (10/04/17 0600)  SpO2: 100 % (10/04/17 0600) Vital Signs (24h Range):  Temp:  [98.1 °F (36.7 °C)-99.8 °F (37.7 °C)] 98.1 °F (36.7 °C)  Pulse:  [106-116] 106  Resp:  [15-32] 24  SpO2:  [97 %-100 %] 100 %  BP: (118-136)/(57-83) 127/67     Height: 5' 7" (170.2 cm)  Weight: 82.1 kg (181 lb)  Body mass index is 28.35 kg/m².      Intake/Output Summary (Last 24 hours) at 10/04/17 1017  Last data filed at 10/04/17 0600   Gross per 24 hour   Intake             2720 ml   Output                0 ml   Net             2720 ml       Physical Exam  Mental Status Exam:  Appearance: disheveled, malnourished, lying in bed, jaundiced  Behavior/Cooperation: limited/ appropriate restless and fidgety , eye contact minimal, easily distracted  Speech: appropriate rate, volume and tone articulation error, increased latency of response  Language: uses words appropriately; NO aphasia or dysarthria  Mood: irritable  Affect:  congruent with mood and appropriate to situation/content   Thought Process: tangential  Thought Content: normal, no suicidality, no homicidality, delusions, or paranoia  Level of Consciousness: Alert, oriented to place, was not oriented to time, self, situation   Memory:  Grossly impaired  Attention/concentration: easily distractable   Fund of Knowledge: unable to assess  Insight:  Impaired, patient is not oriented to situation, does not understand why he is seeing psychiatry or why he is in an ICU  Judgment: Impaired, patient requesting PO liquids and removal of NG tube despite repeated counseling on its necessity at this time         Significant Labs:   Last 24 Hours: "   Recent Lab Results       10/04/17  0534 10/04/17  0247 10/03/17  1148 10/03/17  1052 10/03/17  1035      Procalcitonin    0.87  Comment:  A concentration < 0.25 ng/mL represents a low risk bacterial   infection.  Procalcitonin may not be accurate among patients with localized   infection, recent trauma or major surgery, immunosuppressed state,   invasive fungal infection, renal dysfunction. Decisions regarding   initiation or continuation of antibiotic therapy should not be based   solely on procalcitonin levels.  (H)      Albumin 2.3(L)         Alkaline Phosphatase 141(H)         ALT 69(H)         Anion Gap 14         Aniso  Slight        (H)         BANDS  2.0        Basophil%  0.0        Bilirubin, Direct >14.0(H)         Total Bilirubin 31.3  Comment:  For infants and newborns, interpretation of results should be based  on gestational age, weight and in agreement with clinical  observations.  Premature Infant recommended reference ranges:  Up to 24 hours.............<8.0 mg/dL  Up to 48 hours............<12.0 mg/dL  3-5 days..................<15.0 mg/dL  6-29 days.................<15.0 mg/dL  (H)         Blood Culture, Routine     No Growth to date[P]     BUN, Bld 32(H)         C difficile Toxins A+B, EIA   Negative  Comment:  Testing not recommended for children <24 months old.       C. diff Antigen   Negative       Calcium 8.4(L)         Chloride 117(H)         CO2 14(L)         Creatinine 1.3         Differential Method  Manual        eGFR if  >60.0         eGFR if non  >60.0  Comment:  Calculation used to obtain the estimated glomerular filtration  rate (eGFR) is the CKD-EPI equation. Since race is unknown   in our information system, the eGFR values for   -American and Non--American patients are given   for each creatinine result.           Eosinophil%  1.0        Glucose 139(H)         Gran%  86.0(H)        Hematocrit  33.2(L)        Hemoglobin   11.2(L)        Hypo  Occasional        Coumadin Monitoring INR 1.7  Comment:  Coumadin Therapy:  2.0 - 3.0 for INR for all indicators except mechanical heart valves  and antiphospholipid syndromes which should use 2.5 - 3.5.  (H)         Lymph%  7.0(L)        MCH  39.0(H)        MCHC  33.7        MCV  116(H)        Mono%  4.0        MPV  10.9        Platelet Estimate  Decreased(A)        Platelets  108(L)        Poik  Slight        Poly  Occasional        Potassium 3.4(L)         Total Protein 5.8(L)         Protime 17.0(H)         RBC  2.87(L)        RDW  16.7(H)        Rouleaux  Present(A)        Sodium 145         WBC  26.00(H)                        Significant Imaging: None

## 2017-10-04 NOTE — HOSPITAL COURSE
Interval History 10/4: Patient visited this morning.  He was oriented to place but not to situation, self, or date.  He displayed trouble with word finding.  Perseverated on the removal of his NG tube.  Informed patient that he was improving but not ready to complete a psychiatric interview at this time.  Patient did not display understanding.     10/5: Patient visited this morning while he was undergoing a bedside ultrasound from the primary team.  Spoke to mother at bedside who feels patient's mental status is improving.  She continues to express concern that patient is not oriented to the date.  He told a nurse yesterday that his mother was a doctor who kept bothering him.  Patient also told long winded stories about the [employer] book series last night.  He is better oriented to situation, as he understands he is in the hospital and is very sick.    11/2: Per chart review some concern for delirium, which appears to be resolving. Pt reports increased fatigue and irritability s/p dialysis.

## 2017-10-04 NOTE — PLAN OF CARE
Problem: Patient Care Overview  Goal: Plan of Care Review  Outcome: Ongoing (interventions implemented as appropriate)  No acute events throughout the night. Patient remains confused at times but is oriented to person and place. HR remains sinus tach and BP stable throughout shift. Patient remains in bilateral wrist restraints because he is still trying to pull on NG tube. Tolerating tube feeds at 40cc/hour. Patient had 5 loose bowel movements this shift.    Mother at bedside throughout the night. POC reviewed with the patient and mother. All questions and concerns addressed. Will continue to monitor

## 2017-10-04 NOTE — ASSESSMENT & PLAN NOTE
-Will continue to assess patient's mental status for appropriateness to complete evaluation of alcohol use and suitability for transplant.  Patient is still disoriented and unable to be interviewed at this time  -Psychiatry will continue to follow

## 2017-10-04 NOTE — ASSESSMENT & PLAN NOTE
--see above  MELD-Na score: 28 at 10/4/2017  5:34 AM  MELD score: 28 at 10/4/2017  5:34 AM  Calculated from:  Serum Creatinine: 1.3 mg/dL at 10/4/2017  5:34 AM  Serum Sodium: 145 mmol/L (Rounded to 137) at 10/4/2017  5:34 AM  Total Bilirubin: 31.3 mg/dL at 10/4/2017  5:34 AM  INR(ratio): 1.7 at 10/4/2017  5:34 AM  Age: 27 years

## 2017-10-04 NOTE — CONSULTS
[facility]  Infectious Disease  Consult Note    Patient Name: Jhonny Diana  MRN: [id number]  Admission Date: 9/27/2017  Hospital Length of Stay: 7 days  Attending Physician: Sajan Sandra MD  Primary Care Provider: Provider [facility]     Isolation Status: Special Contact    Patient information was obtained from patient, parent and past medical records.      Inpatient consult to Infectious Diseases  Consult performed by: RAMÓN EASTMAN  Consult ordered by: WINTERBOTTOM, FIONA  Reason for consult: OLT evaluation, leukocytosis         Assessment/Plan:     * Alcoholic hepatitis without ascites    26yo man w/a history of childhood asthma and alcohol abuse (fifth whiskey daily >10yrs, active through admission) who was admitted on 9/27/2017 as a transfer from an OSH (first admitted 9/6) with 6-8wk progressively worsening fatigue, jaundice, abdominal pain, distension, BLE edema, and 20lb weight gain due to decompensated liver failure and recently diagnosed alcoholic cirrhosis (c/b HE, EV, portal HTN, scant ascites, no SBP, HRS). ID has been consulted to comment on a persistent leukocytosis and transplant candidacy. He remains critically ill at this time.    - may continue zosyn for now but will likely deescalate soon (well covered for possibility of aspiration seen on imaging)  - with decline, would consider empiric addition of fluconazole  - workup for leukocytosis has been appropriate and agree with hepatology that this may well be due to advanced liver failure -- will only add a serum crypto antigen to workup now given confusion although low suspicion (most likely all due to HE)  - transplant prep: will review serologic workup when finalized as much is still pending; will need vaccines updated including HAV, HBV, prevnar, and influenza  - transplant candidacy: pending review of workup detailed above; await final psych evaluation            Thank you for your consult. I will follow-up with  patient. Please contact us if you have any additional questions.     Melanie Bergman MD  Transplant ID Attending  [phone]    Melanie Bergman MD  Infectious Disease  [facility]    Subjective:     Principal Problem: Alcoholic hepatitis without ascites    HPI: Mr. Diana is a 26yo man w/a history of childhood asthma and alcohol abuse (fifth whiskey daily >10yrs, active through admission) who was admitted on 9/27/2017 as a transfer from an OSH (first admitted 9/6) with 6-8wk progressively worsening fatigue, jaundice, abdominal pain, distension, BLE edema, and 20lb weight gain due to decompensated liver failure and recently diagnosed alcoholic cirrhosis for transplant evaluation. ID has been consulted to comment on a progressive leukocytosis since transfer. History is largely obtained from the patients mother as he is currently still encephalopathic although more alert than on prior days. She denies recent F/C/S, headaches, URI symptoms, cough, SOB (except with abdominal distension), antecedent nausea/diarrhea (on lactulose now), dysuria, rashes, or hospitalizations prior to these recent ones. He was born in [place], [place] but has lived his whole life in [place], [place]. Travel includes trips to [place], [place] recently but he has not been outside the  [place] or out of the country. He works for a [employer] service for [employer] indoors and lives in an apartment with a roommate. He has no current significant other or prior h/o STI that she is aware of. His drinking history is detailed above -- he smoked cigarettes until recently but has not used illicit substances to her knowledge. He has a cat his roommate cares for but no other animal or outdoor exposures. Of note, during his prior admission in mid 9/2017 he received steroids for possible acute alcoholic hepatitis but has been off them for some time. Workup in house for his leukocytosis has included serial negative blood/urine cultures; CT CAP that shows minimal edema  vs aspiration in bilateral lungs and known HSM/portal HTN sequelae without enough fluid to tap; CT head without mass lesions; TTE without valve vegetations. He has been afebrile in house despite this leukocytosis.     Past Medical History:   Diagnosis Date    Hypertension        Past Surgical History:   Procedure Laterality Date    APPENDECTOMY         Review of patient's allergies indicates:   Allergen Reactions    Bactrim [sulfamethoxazole-trimethoprim] Other (See Comments)     Mookie Trell Syndrome       Medications:  No prescriptions prior to admission.     Antibiotics     Start     Stop Route Frequency Ordered    10/03/17 1100  piperacillin-tazobactam 4.5 g in dextrose 5 % 100 mL IVPB (ready to mix system)      10/06 0259 IV Every 8 hours (non-standard times) 10/03/17 0757    09/30/17 2100  rifAXIMin tablet 550 mg      -- Oral 2 times daily 09/30/17 1452        Antifungals     None        Antivirals     None           There is no immunization history for the selected administration types on file for this patient.    Family History     None        Social History     Social History    Marital status: Single     Spouse name: N/A    Number of children: N/A    Years of education: N/A     Social History Main Topics    Smoking status: Former Smoker     Types: Cigarettes    Smokeless tobacco: Never Used    Alcohol use Yes      Comment: a fifth of liquor daily for years, cut back over last 2 months    Drug use: No    Sexual activity: Not Asked     Other Topics Concern    None     Social History Narrative    None     Review of Systems   Unable to perform ROS: Mental status change     Objective:     Vital Signs (Most Recent):  Temp: 98.2 °F (36.8 °C) (10/04/17 1100)  Pulse: 108 (10/04/17 1400)  Resp: 18 (10/04/17 1400)  BP: 137/79 (10/04/17 1400)  SpO2: 99 % (10/04/17 1400) Vital Signs (24h Range):  Temp:  [98.1 °F (36.7 °C)-98.6 °F (37 °C)] 98.2 °F (36.8 °C)  Pulse:  [103-116] 108  Resp:  [16-32] 18  SpO2:   [97 %-100 %] 99 %  BP: (118-164)/(57-79) 137/79     Weight: 82.1 kg (181 lb)  Body mass index is 28.35 kg/m².    Estimated Creatinine Clearance: 86.8 mL/min (based on SCr of 1.3 mg/dL).    Physical Exam   Constitutional: He appears well-developed. He is cooperative.  Non-toxic appearance.   HENT:   Head: Normocephalic.   Eyes: Pupils are equal, round, and reactive to light. Scleral icterus is present.   Neck: Normal range of motion.   Cardiovascular: Regular rhythm, normal heart sounds and normal pulses.  Tachycardia present.    Pulmonary/Chest: He has decreased breath sounds in the right lower field and the left lower field.   Abdominal: Normal appearance. He exhibits distension. Bowel sounds are decreased.   Musculoskeletal: Normal range of motion.   Neurological: He is disoriented.   Patient follows commands but needs restraints to prevent removal of NGT(removed x 2 in last 24hrs).   Also removed condom cath and does not urinate in a urinal  Reportedly more awake to day   Skin: Skin is warm and dry.   Old blister/ wart on right plantar surface of the foot near the big toe   Psychiatric: His affect is labile. His speech is delayed. Cognition and memory are impaired. He is inattentive.   Nursing note and vitals reviewed.      Significant Labs:   CBC:   Recent Labs  Lab 10/03/17  0354 10/04/17  0247   WBC 24.04* 26.00*   HGB 10.4* 11.2*   HCT 30.4* 33.2*    108*     CMP:   Recent Labs  Lab 10/03/17  0008 10/03/17  0354 10/04/17  0534   NA  --  146* 145   K 3.0* 4.5 3.4*   CL  --  118* 117*   CO2  --  13* 14*   GLU  --  141* 139*   BUN  --  36* 32*   CREATININE  --  0.9 1.3   CALCIUM  --  8.5* 8.4*   PROT  --  6.3 5.8*   ALBUMIN  --  2.4* 2.3*   BILITOT  --  29.5* 31.3*   ALKPHOS  --  129 141*   AST  --  148* 116*   ALT  --  69* 69*   ANIONGAP  --  15 14   EGFRNONAA  --  >60.0 >60.0       Significant Imaging: I have reviewed all pertinent imaging results/findings within the past 24 hours.     CT CAP:  Hepatomegaly.      Small volume ascites, mesenteric edema, portal colopathy, anasarca and mild splenomegaly suggesting sequela of portal hypertension.      Patchy confluent groundglass opacities throughout both lower lungs, probably edema, aspiration, or pneumonia.    Prominent loops of small bowel containing fecal material mixed with scattered gas density suggesting slow transit.    Microbiology:  9/28 blood cx: negative   9/28 urine cx: negative  10/3 blood cx: negative  10/3 C.diff negative

## 2017-10-05 PROBLEM — K70.31 ASCITES DUE TO ALCOHOLIC CIRRHOSIS: Status: ACTIVE | Noted: 2017-10-05

## 2017-10-05 LAB
ALBUMIN FLD-MCNC: 0.9 G/DL
ALBUMIN SERPL BCP-MCNC: 2.1 G/DL
ALP SERPL-CCNC: 150 U/L
ALT SERPL W/O P-5'-P-CCNC: 66 U/L
ANION GAP SERPL CALC-SCNC: 11 MMOL/L
ANISOCYTOSIS BLD QL SMEAR: SLIGHT
APPEARANCE FLD: CLEAR
AST SERPL-CCNC: 109 U/L
BASOPHILS # BLD AUTO: ABNORMAL K/UL
BASOPHILS NFR BLD: 0.5 %
BILIRUB DIRECT SERPL-MCNC: >14 MG/DL
BILIRUB SERPL-MCNC: 29.8 MG/DL
BODY FLD TYPE: NORMAL
BUN SERPL-MCNC: 33 MG/DL
BURR CELLS BLD QL SMEAR: ABNORMAL
CALCIUM SERPL-MCNC: 8.2 MG/DL
CHLORIDE SERPL-SCNC: 112 MMOL/L
CO2 SERPL-SCNC: 15 MMOL/L
COLOR FLD: YELLOW
CREAT SERPL-MCNC: 1.2 MG/DL
CRYPTOC AG SER QL LA: NEGATIVE
DIFFERENTIAL METHOD: ABNORMAL
EOSINOPHIL # BLD AUTO: ABNORMAL K/UL
EOSINOPHIL NFR BLD: 0 %
ERYTHROCYTE [DISTWIDTH] IN BLOOD BY AUTOMATED COUNT: 14.6 %
EST. GFR  (AFRICAN AMERICAN): >60 ML/MIN/1.73 M^2
EST. GFR  (NON AFRICAN AMERICAN): >60 ML/MIN/1.73 M^2
GLUCOSE FLD-MCNC: 125 MG/DL
GLUCOSE SERPL-MCNC: 120 MG/DL
GRAM STN SPEC: NORMAL
GRAM STN SPEC: NORMAL
HCT VFR BLD AUTO: 29.6 %
HGB BLD-MCNC: 10.3 G/DL
INR PPP: 1.6
LYMPHOCYTES # BLD AUTO: ABNORMAL K/UL
LYMPHOCYTES NFR BLD: 6.5 %
LYMPHOCYTES NFR FLD MANUAL: 28 %
MAGNESIUM SERPL-MCNC: 1.4 MG/DL
MCH RBC QN AUTO: 38.4 PG
MCHC RBC AUTO-ENTMCNC: 34.8 G/DL
MCV RBC AUTO: 110 FL
MESOTHL CELL NFR FLD MANUAL: 1 %
MONOCYTES # BLD AUTO: ABNORMAL K/UL
MONOCYTES NFR BLD: 3.5 %
MONOS+MACROS NFR FLD MANUAL: 35 %
NEUTROPHILS NFR BLD: 89 %
NEUTROPHILS NFR FLD MANUAL: 36 %
NEUTS BAND NFR BLD MANUAL: 0.5 %
PHOSPHATE SERPL-MCNC: 2 MG/DL
PHOSPHATIDYLETHANOL (PETH): 174 NG/ML
PLATELET # BLD AUTO: 126 K/UL
PLATELET BLD QL SMEAR: ABNORMAL
PMV BLD AUTO: 11.1 FL
POIKILOCYTOSIS BLD QL SMEAR: ABNORMAL
POLYCHROMASIA BLD QL SMEAR: ABNORMAL
POTASSIUM SERPL-SCNC: 3.8 MMOL/L
PROT FLD-MCNC: 1.6 G/DL
PROT SERPL-MCNC: 5.6 G/DL
PROTHROMBIN TIME: 16.6 SEC
RBC # BLD AUTO: 2.68 M/UL
SODIUM SERPL-SCNC: 138 MMOL/L
SPECIMEN SOURCE: NORMAL
WBC # BLD AUTO: 29.96 K/UL
WBC # FLD: 127 /CU MM

## 2017-10-05 PROCEDURE — 63600175 PHARM REV CODE 636 W HCPCS: Performed by: STUDENT IN AN ORGANIZED HEALTH CARE EDUCATION/TRAINING PROGRAM

## 2017-10-05 PROCEDURE — 97803 MED NUTRITION INDIV SUBSEQ: CPT

## 2017-10-05 PROCEDURE — 63600175 PHARM REV CODE 636 W HCPCS: Performed by: CLINICAL NURSE SPECIALIST

## 2017-10-05 PROCEDURE — 25000003 PHARM REV CODE 250: Performed by: NURSE PRACTITIONER

## 2017-10-05 PROCEDURE — 25000003 PHARM REV CODE 250: Performed by: INTERNAL MEDICINE

## 2017-10-05 PROCEDURE — 99233 SBSQ HOSP IP/OBS HIGH 50: CPT | Mod: ,,, | Performed by: INTERNAL MEDICINE

## 2017-10-05 PROCEDURE — 85007 BL SMEAR W/DIFF WBC COUNT: CPT

## 2017-10-05 PROCEDURE — 88112 CYTOPATH CELL ENHANCE TECH: CPT | Mod: 26,NTX,, | Performed by: PATHOLOGY

## 2017-10-05 PROCEDURE — 63600175 PHARM REV CODE 636 W HCPCS: Performed by: NURSE PRACTITIONER

## 2017-10-05 PROCEDURE — 94761 N-INVAS EAR/PLS OXIMETRY MLT: CPT

## 2017-10-05 PROCEDURE — 82042 OTHER SOURCE ALBUMIN QUAN EA: CPT

## 2017-10-05 PROCEDURE — 49082 ABD PARACENTESIS: CPT | Mod: ,,, | Performed by: INTERNAL MEDICINE

## 2017-10-05 PROCEDURE — 89051 BODY FLUID CELL COUNT: CPT

## 2017-10-05 PROCEDURE — 97112 NEUROMUSCULAR REEDUCATION: CPT

## 2017-10-05 PROCEDURE — C1729 CATH, DRAINAGE: HCPCS

## 2017-10-05 PROCEDURE — 82945 GLUCOSE OTHER FLUID: CPT

## 2017-10-05 PROCEDURE — 88305 TISSUE EXAM BY PATHOLOGIST: CPT | Mod: NTX | Performed by: PATHOLOGY

## 2017-10-05 PROCEDURE — 86403 PARTICLE AGGLUT ANTBDY SCRN: CPT

## 2017-10-05 PROCEDURE — 87205 SMEAR GRAM STAIN: CPT

## 2017-10-05 PROCEDURE — 99291 CRITICAL CARE FIRST HOUR: CPT | Mod: 25,,, | Performed by: CLINICAL NURSE SPECIALIST

## 2017-10-05 PROCEDURE — 84100 ASSAY OF PHOSPHORUS: CPT

## 2017-10-05 PROCEDURE — 0W9G3ZX DRAINAGE OF PERITONEAL CAVITY, PERCUTANEOUS APPROACH, DIAGNOSTIC: ICD-10-PCS | Performed by: INTERNAL MEDICINE

## 2017-10-05 PROCEDURE — 97535 SELF CARE MNGMENT TRAINING: CPT

## 2017-10-05 PROCEDURE — 83735 ASSAY OF MAGNESIUM: CPT

## 2017-10-05 PROCEDURE — 82150 ASSAY OF AMYLASE: CPT

## 2017-10-05 PROCEDURE — 84157 ASSAY OF PROTEIN OTHER: CPT

## 2017-10-05 PROCEDURE — 99232 SBSQ HOSP IP/OBS MODERATE 35: CPT | Mod: ,,, | Performed by: INTERNAL MEDICINE

## 2017-10-05 PROCEDURE — 87070 CULTURE OTHR SPECIMN AEROBIC: CPT

## 2017-10-05 PROCEDURE — 25000003 PHARM REV CODE 250: Performed by: CLINICAL NURSE SPECIALIST

## 2017-10-05 PROCEDURE — 85610 PROTHROMBIN TIME: CPT

## 2017-10-05 PROCEDURE — 80048 BASIC METABOLIC PNL TOTAL CA: CPT

## 2017-10-05 PROCEDURE — 97116 GAIT TRAINING THERAPY: CPT

## 2017-10-05 PROCEDURE — 80076 HEPATIC FUNCTION PANEL: CPT

## 2017-10-05 PROCEDURE — 97530 THERAPEUTIC ACTIVITIES: CPT

## 2017-10-05 PROCEDURE — 85027 COMPLETE CBC AUTOMATED: CPT

## 2017-10-05 PROCEDURE — 20600001 HC STEP DOWN PRIVATE ROOM

## 2017-10-05 RX ORDER — LACTULOSE 10 G/15ML
10 SOLUTION ORAL DAILY
Status: DISCONTINUED | OUTPATIENT
Start: 2017-10-06 | End: 2017-10-10

## 2017-10-05 RX ORDER — MAGNESIUM SULFATE HEPTAHYDRATE 40 MG/ML
2 INJECTION, SOLUTION INTRAVENOUS ONCE
Status: COMPLETED | OUTPATIENT
Start: 2017-10-05 | End: 2017-10-05

## 2017-10-05 RX ORDER — SODIUM,POTASSIUM PHOSPHATES 280-250MG
2 POWDER IN PACKET (EA) ORAL
Status: COMPLETED | OUTPATIENT
Start: 2017-10-05 | End: 2017-10-05

## 2017-10-05 RX ORDER — LIDOCAINE HYDROCHLORIDE 10 MG/ML
INJECTION INFILTRATION; PERINEURAL
Status: DISPENSED
Start: 2017-10-05 | End: 2017-10-06

## 2017-10-05 RX ORDER — FLUCONAZOLE 2 MG/ML
400 INJECTION, SOLUTION INTRAVENOUS
Status: DISCONTINUED | OUTPATIENT
Start: 2017-10-05 | End: 2017-10-06

## 2017-10-05 RX ADMIN — RIFAXIMIN 550 MG: 550 TABLET ORAL at 08:10

## 2017-10-05 RX ADMIN — POTASSIUM & SODIUM PHOSPHATES POWDER PACK 280-160-250 MG 2 PACKET: 280-160-250 PACK at 05:10

## 2017-10-05 RX ADMIN — POTASSIUM & SODIUM PHOSPHATES POWDER PACK 280-160-250 MG 2 PACKET: 280-160-250 PACK at 04:10

## 2017-10-05 RX ADMIN — DEXTROSE MONOHYDRATE: 5 INJECTION, SOLUTION INTRAVENOUS at 05:10

## 2017-10-05 RX ADMIN — MAGNESIUM SULFATE IN WATER 2 G: 40 INJECTION, SOLUTION INTRAVENOUS at 05:10

## 2017-10-05 RX ADMIN — FAMOTIDINE 20 MG: 20 TABLET, FILM COATED ORAL at 08:10

## 2017-10-05 RX ADMIN — POTASSIUM & SODIUM PHOSPHATES POWDER PACK 280-160-250 MG 2 PACKET: 280-160-250 PACK at 08:10

## 2017-10-05 RX ADMIN — HEPARIN SODIUM 5000 UNITS: 5000 INJECTION, SOLUTION INTRAVENOUS; SUBCUTANEOUS at 06:10

## 2017-10-05 RX ADMIN — HEPARIN SODIUM 5000 UNITS: 5000 INJECTION, SOLUTION INTRAVENOUS; SUBCUTANEOUS at 09:10

## 2017-10-05 RX ADMIN — POTASSIUM & SODIUM PHOSPHATES POWDER PACK 280-160-250 MG 2 PACKET: 280-160-250 PACK at 09:10

## 2017-10-05 RX ADMIN — PIPERACILLIN AND TAZOBACTAM 4.5 G: 4; .5 INJECTION, POWDER, FOR SOLUTION INTRAVENOUS at 04:10

## 2017-10-05 RX ADMIN — PIPERACILLIN AND TAZOBACTAM 4.5 G: 4; .5 INJECTION, POWDER, FOR SOLUTION INTRAVENOUS at 12:10

## 2017-10-05 RX ADMIN — MAGNESIUM SULFATE IN WATER 2 G: 40 INJECTION, SOLUTION INTRAVENOUS at 08:10

## 2017-10-05 RX ADMIN — FLUCONAZOLE 400 MG: 2 INJECTION INTRAVENOUS at 09:10

## 2017-10-05 RX ADMIN — PIPERACILLIN AND TAZOBACTAM 4.5 G: 4; .5 INJECTION, POWDER, FOR SOLUTION INTRAVENOUS at 08:10

## 2017-10-05 RX ADMIN — POTASSIUM CHLORIDE 40 MEQ: 20 SOLUTION ORAL at 07:10

## 2017-10-05 RX ADMIN — POTASSIUM & SODIUM PHOSPHATES POWDER PACK 280-160-250 MG 2 PACKET: 280-160-250 PACK at 12:10

## 2017-10-05 RX ADMIN — LACTULOSE 15 G: 20 SOLUTION ORAL at 08:10

## 2017-10-05 RX ADMIN — SODIUM CHLORIDE, SODIUM LACTATE, POTASSIUM CHLORIDE, AND CALCIUM CHLORIDE 500 ML: 600; 310; 30; 20 INJECTION, SOLUTION INTRAVENOUS at 09:10

## 2017-10-05 NOTE — ASSESSMENT & PLAN NOTE
-- Pt had an episode of hematemesis at OSH and EGD that revealed 1 small varix, reflux esophagitis, and gastropathy of portal hypertension  --no evidence of bleeding currently.  --trend Hgbs

## 2017-10-05 NOTE — ASSESSMENT & PLAN NOTE
Patient is receiving tubes feeds and is tolerating well  -- Hepatology would like to continue tube feeds   -- Awake and passed swallow study but he will need to closely monitor mental status when providing oral nutrition  -- Ice chips and water. Held overnight for abdominal pain      Appreciate Nutrition recommendation/Intervention:   -- TF formula -Isosource 1.5 @ a goal rate of 60ml/hr    -- Adv diet to 2gm Na once safe for PO intake

## 2017-10-05 NOTE — PT/OT/SLP PROGRESS
Physical Therapy  Treatment    Jhonny Diana   MRN: [id number]   Admitting Diagnosis: Alcoholic hepatitis without ascites    PT Received On: 10/04/17  PT Start Time: 1618     PT Stop Time: 1642    PT Total Time (min): 24 min       Billable Minutes:  Gait Training 10 mins and Therapeutic Activity 14 mins    Treatment Type: Treatment  PT/PTA: PT     PTA Visit Number: 0       General Precautions: Standard, fall  Orthopedic Precautions: N/A   Braces: N/A    Do you have any cultural, spiritual, Synagogue conflicts, given your current situation?: none reported     Subjective:  Communicated with RN prior to session.  Pt and mother agreeable to session    Pain/Comfort  Pain Rating 1:  (pt does not quantify or localize pain)    Objective:   Patient found with: blood pressure cuff, pulse ox (continuous), telemetry, peripheral IV, NG tube    Functional Mobility:  Bed Mobility:   Supine to Sit: Maximum Assistance    Transfers:  Sit <> Stand Assistance: Moderate Assistance  Sit <> Stand Assistive Device:  (HHA)    Gait:   Gait Distance: 4 feet  Assistance 1: Moderate assistance, With assist of two (3rd person for line management)  Gait Assistive Device: Hand held assist  Gait Pattern: 2-point gait  Gait Deviation(s): decreased saskia, increased time in double stance, decreased velocity of limb motion, decreased step length, decreased weight-shifting ability    Balance:   Static Sit: FAIR+: Able to take MINIMAL challenges from all directions  Dynamic Sit: FAIR: Cannot move trunk without losing balance  Static Stand: FAIR: Maintains without assist but unable to take challenges  Dynamic stand: POOR: N/A     Therapeutic Activities and Exercises:  Pt educated on upright posture, using BUE to assist with transfers/bed mobility, blocking B knees, and extension B knees for upright posture. Pt is noted to have difficulty moving LLE during sitting/standing mobility, requiring physical assist and max cuing for placement. Pt and mother  educated on performing ankle pumps and knee flexion/extension throughout the day, mother verbalizes understanding.      AM-PAC 6 CLICK MOBILITY  How much help from another person does this patient currently need?   1 = Unable, Total/Dependent Assistance  2 = A lot, Maximum/Moderate Assistance  3 = A little, Minimum/Contact Guard/Supervision  4 = None, Modified [place]/Independent    Turning over in bed (including adjusting bedclothes, sheets and blankets)?: 2  Sitting down on and standing up from a chair with arms (e.g., wheelchair, bedside commode, etc.): 2  Moving from lying on back to sitting on the side of the bed?: 2  Moving to and from a bed to a chair (including a wheelchair)?: 2  Need to walk in hospital room?: 2  Climbing 3-5 steps with a railing?: 1  Total Score: 11    AM-PAC Raw Score CMS G-Code Modifier Level of Impairment Assistance   6 % Total / Unable   7 - 9 CM 80 - 100% Maximal Assist   10 - 14 CL 60 - 80% Moderate Assist   15 - 19 CK 40 - 60% Moderate Assist   20 - 22 CJ 20 - 40% Minimal Assist   23 CI 1-20% SBA / CGA   24 CH 0% Independent/ Mod I     Patient left up in chair with all lines intact, call button in reach and RN and mother present.    Assessment:  Jhonny Diana is a 27 y.o. male with a medical diagnosis of Alcoholic hepatitis without ascites and presents with deficits listed below. Pt appears confused this session. Pt's mother able to assist with treatment both physically and assisting with redirecting pt. Pt will need skilled PT to address deficits and increase functional mobility as able.    Rehab identified problem list/impairments: Rehab identified problem list/impairments: weakness, impaired endurance, impaired functional mobilty, gait instability, impaired balance, impaired cognition, decreased coordination, decreased upper extremity function, decreased lower extremity function, decreased safety awareness, impaired cardiopulmonary response to activity    Rehab  potential is good.    Activity tolerance: Good    Discharge recommendations: Discharge Facility/Level Of Care Needs: rehabilitation facility     Barriers to discharge: Barriers to Discharge: Inaccessible home environment (4 [facility])    Equipment recommendations: Equipment Needed After Discharge:  (TBD pending progress)     GOALS:    Physical Therapy Goals        Problem: Physical Therapy Goal    Goal Priority Disciplines Outcome Goal Variances Interventions   Physical Therapy Goal     PT/OT, PT Ongoing (interventions implemented as appropriate)     Description:  Goals to be met by: 10/10/2017    Patient will increase functional independence with mobility by performin. Supine to sit with MInimal Assistance  2. Sit to supine with MInimal Assistance  3. Sit to stand transfer with Minimal Assistance  4. Bed to chair transfer with Moderate Assistance using LRAD or no AD.  5. Gait  x 20 feet with Moderate Assistance using LRAD or no AD.  6. Ascend/descend 4 stair with bilateral Handrails Moderate Assistance using LRAD or no AD.  7. Stand for 8 minutes with Minimal Assistance using LRAD or no AD.  8. Lower extremity exercise program x20 reps per handout, with independence                      PLAN:    Patient to be seen 5 x/week  to address the above listed problems via gait training, therapeutic activities, therapeutic exercises, neuromuscular re-education  Plan of Care expires: 17  Plan of Care reviewed with: patient, mother         Delia COLE Vivienne, PT  10/04/2017

## 2017-10-05 NOTE — PROGRESS NOTES
[facility]  Adult Nutrition  Consult Note    SUMMARY     Recommendations    1. Continue to increase current TF rate (of Isosource 1.5) to 60 mL/hr to meet 100% pt's estimated needs.    = 2160 calories, 98 g of protein, 1100 mL fluid.    - Hold for residuals >500 mL; additional fluid per MD.  2. If able to advance diet, recommend 2 gram sodium diet, texture per SLP.   3. RD to monitor & follow-up.    Goals: Meet >80% of EEN/EPN  Nutrition Goal Status: goal not met  Communication of RD Recs: reviewed with RN    Reason for Assessment    Reason for Assessment: RD follow-up  Diagnosis: liver disease (decompensated EtOH hepatitis )  Relevent Medical History: none significant aside from EtOH abuse   Interdisciplinary Rounds: did not attend     General Information Comments: Pt remains NPO, tolerating current TF regimen via NGT. Disoriented at time of visit. ST to follow-up.  Nutrition Discharge Planning: Unable to determine    Nutrition Prescription Ordered    Current Diet Order: NPO     Current Nutrition Support Formula Ordered: Isosource 1.5  Current Nutrition Support Rate Ordered: 25 (ml)  Current Nutrition Support Frequency Ordered: mL/hr    Evaluation of Received Nutrients/Fluid Intake    Enteral Calories (kcal): 900  Enteral Protein (gm): 41  Enteral (Free Water) Fluid (mL): 458     Energy Calories Required: not meeting needs  % Kcal Needs: 43%     Protein Required: not meeting needs  % Protein Needs: 49%     Fluid Required: not meeting needs     Tolerance: tolerating    Nutrition/Diet History    Patient Reported Diet/Restrictions/Preferences: general     Typical Food/Fluid Intake: Adequate until a few days pta     Food Preferences: KAYLEE     Factors Affecting Nutritional Intake: NPO, impaired cognitive status/motor control    Labs/Tests/Procedures/Meds    Pertinent Labs Reviewed: reviewed  Pertinent Labs Comments: Bili 29.8, , ALT 66  Pertinent Medications Reviewed: reviewed,  "pertinent    Physical Findings    Overall Physical Appearance: overweight, lethargic  Tubes: nasogastric tube  Skin: jaundice, intact    Anthropometrics    Height: 5' 7" (170.2 cm)  Weight Method: Bed Scale  Weight: 83.4 kg (183 lb 13.8 oz)    Ideal Body Weight (IBW), Male: 148 lb  % Ideal Body Weight, Male (lb): 124.24 lb     BMI (Calculated): 28.9  BMI Grade: 25 - 29.9 - overweight     Usual Body Weight (UBW), kg:  (2/2 fluid shifts)    Estimated/Assessed Needs    Weight Used For Calorie Calculations: 83.4 kg (183 lb 13.8 oz)      Energy Calorie Requirements (kcal): 4369-5148 kcal/d  Energy Need Method: [place]-St Jeor     Weight Used For Protein Calculations: 83.4 kg (183 lb 13.8 oz)  Protein Requirements:  g/d     Fluid Need Method: RDA Method (1ml/kcal or per MD)    Assessment and Plan    Decompensated hepatic cirrhosis     Nutrition Problem  Inadequate energy intake     Related to (etiology):   NPO x 4 days w/ new trickle TF order     Signs and Symptoms (as evidenced by):   <80% of EEN/EPN being met    Nutrition Diagnosis Status:   Continues      Monitor and Evaluation    Food and Nutrient Intake: energy intake, food and beverage intake, enteral nutrition intake  Food and Nutrient Adminstration: diet order, enteral and parenteral nutrition administration     Physical Activity and Function: nutrition-related ADLs and IADLs  Anthropometric Measurements: weight, weight change, body mass index  Biochemical Data, Medical Tests and Procedures: inflammatory profile, lipid profile, glucose/endocrine profile, gastrointestinal profile, electrolyte and renal panel  Nutrition-Focused Physical Findings: overall appearance    Nutrition Risk    Level of Risk: other (see comments) (F/u 2 x weekly)    Nutrition Follow-Up    RD Follow-up?: Yes      "

## 2017-10-05 NOTE — PLAN OF CARE
Problem: Physical Therapy Goal  Goal: Physical Therapy Goal  Goals to be met by: 10/10/2017    Patient will increase functional independence with mobility by performin. Supine to sit with MInimal Assistance  2. Sit to supine with MInimal Assistance  3. Sit to stand transfer with Minimal Assistance  4. Bed to chair transfer with Moderate Assistance using LRAD or no AD.  5. Gait  x 20 feet with Moderate Assistance using LRAD or no AD.  6. Ascend/descend 4 stair with bilateral Handrails Moderate Assistance using LRAD or no AD.  7. Stand for 8 minutes with Minimal Assistance using LRAD or no AD.  8. Lower extremity exercise program x20 reps per handout, with independence     Outcome: Ongoing (interventions implemented as appropriate)  Goals remain appropriate

## 2017-10-05 NOTE — SUBJECTIVE & OBJECTIVE
Interval History/Significant Events: Patient's mental status appears to be improving    Review of Systems   Unable to perform ROS: Mental status change     Objective:     Vital Signs (Most Recent):  Temp: 98.8 °F (37.1 °C) (10/05/17 0800)  Pulse: 105 (10/05/17 0800)  Resp: (!) 25 (10/05/17 0800)  BP: 134/63 (10/05/17 0800)  SpO2: 99 % (10/05/17 0800) Vital Signs (24h Range):  Temp:  [98.1 °F (36.7 °C)-98.8 °F (37.1 °C)] 98.8 °F (37.1 °C)  Pulse:  [103-113] 105  Resp:  [16-32] 25  SpO2:  [98 %-100 %] 99 %  BP: (110-164)/(60-81) 134/63   Weight: 83.4 kg (183 lb 13.8 oz)  Body mass index is 28.8 kg/m².      Intake/Output Summary (Last 24 hours) at 10/05/17 0901  Last data filed at 10/05/17 0701   Gross per 24 hour   Intake          3221.67 ml   Output              700 ml   Net          2521.67 ml       Physical Exam   Constitutional: He appears well-developed. He is cooperative. He appears toxic.   HENT:   Head: Normocephalic.   Eyes: Pupils are equal, round, and reactive to light. Scleral icterus is present.   Neck: Normal range of motion.   Cardiovascular: Regular rhythm, normal heart sounds and normal pulses.  Tachycardia present.    Pulmonary/Chest: He has decreased breath sounds in the right lower field and the left lower field.   Abdominal: Normal appearance. He exhibits distension and ascites. Bowel sounds are decreased.   Musculoskeletal: Normal range of motion.   Neurological: He is alert. He has normal strength. GCS eye subscore is 4. GCS verbal subscore is 4. GCS motor subscore is 5.   Patient follows commands but needs restraints to prevent removal of NGT(removed x 2 in last 24hrs).   Also removed condom cath and does not urinate in a urinal  Reportedly more awake to day   Skin: Skin is warm and dry.   Old blister/ wart on right plantar surface of the foot near the big toe   Psychiatric: His affect is labile. His speech is delayed. Cognition and memory are impaired. He is inattentive.   Nursing note and  vitals reviewed.      Vents:     Lines/Drains/Airways     Drain                 NG/OG Tube 09/30/17 1551 Pelon sump 14 Fr. Right nostril 4 days          Peripheral Intravenous Line                 Peripheral IV - Single Lumen Left Hand -- days         Midline Catheter Insertion/Assessment  - Single Lumen 09/27/17 1800 Right 7 days              Significant Labs:    CBC/Anemia Profile:    Recent Labs  Lab 10/04/17  0247 10/05/17  0239   WBC 26.00* 29.96*   HGB 11.2* 10.3*   HCT 33.2* 29.6*   * 126*   * 110*   RDW 16.7* 14.6*        Chemistries:    Recent Labs  Lab 10/04/17  0534 10/04/17  1752 10/05/17  0239     --  138   K 3.4* 4.0 3.8   *  --  112*   CO2 14*  --  15*   BUN 32*  --  33*   CREATININE 1.3  --  1.2   CALCIUM 8.4*  --  8.2*   ALBUMIN 2.3*  --  2.1*   PROT 5.8*  --  5.6*   BILITOT 31.3*  --  29.8*   ALKPHOS 141*  --  150*   ALT 69*  --  66*   *  --  109*   MG  --   --  1.4*   PHOS  --   --  2.0*       All pertinent labs within the past 24 hours have been reviewed.    Significant Imaging:  I have reviewed and interpreted all pertinent imaging results/findings within the past 24 hours.

## 2017-10-05 NOTE — PROGRESS NOTES
"[facility] [address]  Hepatology  Progress Note    Patient Name: Jhonny Diana  MRN: [id number]  Admission Date: 9/27/2017  Hospital Length of Stay: 8 days  Attending Provider: Sajan Sandra MD   Primary Care Physician: Provider Notinsystem  Principal Problem:Alcoholic hepatitis with ascites    Subjective:     Transplant status: No    HPI: 28 yo male with hx of childhood asthma and heavy alcohol abuse (fifth of liquor daily since teenager, last drink 2.5 wks ago per mother) who was [place] [facility] [place] level of [place]/liver transplant evaluation.    History obtained per chart review and discussion with mother: Patient initially presented to OSH for a 2 mo hx of progressively worsening abdominal pain with associated fatigue, jaundice, and abdominal distention with ~20# weight gain.     The patient's mother states he began having abdominal pain about 6 months ago.  States he underwent many tests, all of which were inconclusive.  He then began to have abdominal swelling about two months ago and was in&out of the local ER.  He was still actively drinking up until about 2.5 weeks ago according to the mother but she is unsure of this.  At the OSH the patient underwent an EGD which showed g1EV.  He was also diagnosed with "pancreatitis" based on an elevated lipase.  The patient was also placed on prednisone given concerns for alcoholic hepatitis.           Interval History:   No acute events overnight.  Patient's mentation continues to improve.    Current Facility-Administered Medications   Medication    famotidine tablet 20 mg    fluconazole (DIFLUCAN) IVPB 400 mg 200 mL    heparin (porcine) injection 5,000 Units    [START ON 10/6/2017] lactulose 20 gram/30 mL solution Soln 10 g    lidocaine HCL 10 mg/ml (1%) 10 mg/mL (1 %) injection    ondansetron injection 4 mg    piperacillin-tazobactam 4.5 g in dextrose 5 % 100 mL IVPB (ready to mix system)    pneumoc 13-arianne conj-dip cr(PF) 0.5 mL    " potassium chloride 10% solution 40 mEq    potassium chloride 10% solution 40 mEq    potassium chloride 10% solution 60 mEq    potassium, sodium phosphates 280-160-250 mg packet 2 packet    potassium, sodium phosphates 280-160-250 mg packet 2 packet    potassium, sodium phosphates 280-160-250 mg packet 2 packet    potassium, sodium phosphates 280-160-250 mg packet 2 packet    rifAXIMin tablet 550 mg       Objective:     Vital Signs (Most Recent):  Temp: 98.8 °F (37.1 °C) (10/05/17 0800)  Pulse: 104 (10/05/17 1300)  Resp: (!) 29 (10/05/17 1300)  BP: 121/65 (10/05/17 1300)  SpO2: 99 % (10/05/17 1300) Vital Signs (24h Range):  Temp:  [98.1 °F (36.7 °C)-98.8 °F (37.1 °C)] 98.8 °F (37.1 °C)  Pulse:  [103-113] 104  Resp:  [18-32] 29  SpO2:  [98 %-100 %] 99 %  BP: (110-150)/(56-81) 121/65     Weight: 83.4 kg (183 lb 13.8 oz) (10/05/17 1200)  Body mass index is 28.8 kg/m².    Physical Exam   Constitutional: No distress.   HENT:   Head: Normocephalic.   Eyes: Pupils are equal, round, and reactive to light. Scleral icterus is present.   Neck: Normal range of motion.   Cardiovascular:   Sinus tachycardia   Pulmonary/Chest: Effort normal and breath sounds normal.   Abdominal: Bowel sounds are normal. He exhibits distension. He exhibits no mass. There is no tenderness. There is no rebound and no guarding. No hernia.   Musculoskeletal: Normal range of motion. He exhibits no edema.   Neurological:   Oriented to self but not to year or place, Grade 1-2 encephalopathy    Skin: He is not diaphoretic.       MELD-Na score: 26 at 10/5/2017  2:39 AM  MELD score: 26 at 10/5/2017  2:39 AM  Calculated from:  Serum Creatinine: 1.2 mg/dL at 10/5/2017  2:39 AM  Serum Sodium: 138 mmol/L (Rounded to 137) at 10/5/2017  2:39 AM  Total Bilirubin: 29.8 mg/dL at 10/5/2017  2:39 AM  INR(ratio): 1.6 at 10/5/2017  2:39 AM  Age: 27 years    Significant Labs:  CBC:   Recent Labs  Lab 10/05/17  0239   WBC 29.96*   RBC 2.68*   HGB 10.3*   HCT 29.6*    *     CMP:   Recent Labs  Lab 10/05/17  0239   *   CALCIUM 8.2*   ALBUMIN 2.1*   PROT 5.6*      K 3.8   CO2 15*   *   BUN 33*   CREATININE 1.2   ALKPHOS 150*   ALT 66*   *   BILITOT 29.8*     Coagulation:   Recent Labs  Lab 09/28/17  1727  10/05/17  0239   INR 1.8*  < > 1.6*   APTT 32.1*  --   --    < > = values in this interval not displayed.    Significant Imaging:  No recent images to review    Assessment/Plan:     Decompensated hepatic cirrhosis    27 year old male with a history of alcohol abuse transferred from OSH for higher level of care. On admission his issues were alcoholic hepatitis, possible acute pancreatitis (4/5 BiSAP score, high mortality) MELD-Na score 34, acute renal injury, malnutrition, and debility.    Patient continues to improved as far as alertness/mentation and ability to work with physical therapy. Sodium and LAURA at baseline yet WBC continues to rise.    Recommendations:  -Continue Lactulose (can be titrated to ~ 3-5 BM per day) and Rifaximin  -Monitor for signs of infection, worsening WBC this AM but no clear source of infection, ID on board-recs appreciated  -Addiction psych to visit with him again ?next week, saw him today but he was still not completely oriented  -Obtain outside records from [facility] and the [facility].           Hypernatremia    Resolved        Acute renal failure with tubular necrosis    Resolved            Thank you for your consult. I will follow-up with patient. Please contact us if you have any additional questions.  Divya Booker M.D.  Gastroenterology Fellow, PGY-IV  Pager: [phone]  [facility]

## 2017-10-05 NOTE — PT/OT/SLP PROGRESS
"Physical Therapy  Treatment    Jhonny Diana   MRN: [id number]   Admitting Diagnosis: Alcoholic hepatitis with ascites    PT Received On: 10/05/17  PT Start Time: 0940     PT Stop Time: 1018    PT Total Time (min): 38 min       Billable Minutes:  Gait [id number] and Neuromuscular Re-education 15  Treatment Type: Treatment (Co-treat with OT 2nd to safety with functional mobility and line management)  PT/PTA: PT     PTA Visit Number: 0       General Precautions: Standard, fall  Orthopedic Precautions: N/A   Braces: N/A    Do you have any cultural, spiritual, Adventism conflicts, given your current situation?: none reported     Subjective:  Communicated with nsg prior to session. Pt agreeable to session. Pt having coherent conversations throughout session. Pt said, "I'm so happy I'm walking right now."      Pain/Comfort  Pain Rating 1: 0/10  Pain Rating Post-Intervention 1: 0/10    Objective:   Patient found with: telemetry, pulse ox (continuous), blood pressure cuff, bowel management system, peripheral IV, NG tube    Functional Mobility:  Bed Mobility:   Supine to Sit: Maximum Assistance; requires assist with trunk and B LE's    Transfers:  Sit <> Stand Assistance: Minimum Assistance; VC's for technique with RW  Sit <> Stand Assistive Device: Rolling Walker    Gait:   Gait Distance: 20 ft bed>door>chair with RW  Assistance 1: Moderate assistance, With assist of two  Gait Assistive Device: Rolling walker  Gait Pattern: reciprocal    Pt required verbal cues and physical assist to increase step length. Pt required Mod A for B weight shifting and moving R LE to increase step length. Pt with 1 posterior LOB needing Min a to correct. Pt reported being tired after ambulation. Pt with uncontrolled descent when sitting in chair. Pt educated on safety when sitting. Pt verbalized understanding.     Neuromuscular re-education:  Balance:   Pt cued and encouraged to self-correct LOB's to maintain upright posture statically and " dynamically   Static Sit: x 8 minutes Supervision;   Dynamic Sit: Mod A  Static Stand: Min A  Dynamic stand: Mod A x 2;     Therapeutic Activities and Exercises:  Education on safe functional mobility with RW.   Education of safe descent to chair.  Physical assist during ambulation for weight shifting, balance, and advancement of Le's.  Pt verbalized understanding     AM-PAC 6 CLICK MOBILITY  How much help from another person does this patient currently need?   1 = Unable, Total/Dependent Assistance  2 = A lot, Maximum/Moderate Assistance  3 = A little, Minimum/Contact Guard/Supervision  4 = None, Modified [place]/Independent    Turning over in bed (including adjusting bedclothes, sheets and blankets)?: 2  Sitting down on and standing up from a chair with arms (e.g., wheelchair, bedside commode, etc.): 3  Moving from lying on back to sitting on the side of the bed?: 2  Moving to and from a bed to a chair (including a wheelchair)?: 2  Need to walk in hospital room?: 2  Climbing 3-5 steps with a railing?: 1  Total Score: 12    AM-PAC Raw Score CMS G-Code Modifier Level of Impairment Assistance   6 % Total / Unable   7 - 9 CM 80 - 100% Maximal Assist   10 - 14 CL 60 - 80% Moderate Assist   15 - 19 CK 40 - 60% Moderate Assist   20 - 22 CJ 20 - 40% Minimal Assist   23 CI 1-20% SBA / CGA   24 CH 0% Independent/ Mod I     Patient left up in chair with all lines intact, call button in reach, nsg notified and mother present.    Assessment:  Jhonny Diana is a 27 y.o. male with a medical diagnosis of Alcoholic hepatitis with ascites and presents with impairments listed below. Pt progressing towards goals, but not at PLOF. Pt tolerated session well with no increase in pain/discomfort but tired after session.  Pt is improving with therapy evidenced by increased gait distance and improved cognition seen with fluent conversations. Pt requires cuing for RW management, weight shifting, and foot advancement during  ambulation. Pt would benefit from continued PT services to improve overall functional mobility. Recommend d/c to Rehab to maximize functional independence.      Rehab identified problem list/impairments: Rehab identified problem list/impairments: weakness, impaired endurance, impaired self care skills, impaired functional mobilty, gait instability, impaired balance, decreased safety awareness, edema    Rehab potential is good.    Activity tolerance: Good    Discharge recommendations: Discharge Facility/Level Of Care Needs: rehabilitation facility     Barriers to discharge: Barriers to Discharge: Inaccessible home environment (ALEXA to enter home)    Equipment recommendations: Equipment Needed After Discharge:  (TBD)     GOALS:    Physical Therapy Goals        Problem: Physical Therapy Goal    Goal Priority Disciplines Outcome Goal Variances Interventions   Physical Therapy Goal     PT/OT, PT Ongoing (interventions implemented as appropriate)     Description:  Goals to be met by: 10/10/2017    Patient will increase functional independence with mobility by performin. Supine to sit with MInimal Assistance  2. Sit to supine with MInimal Assistance  3. Sit to stand transfer with Minimal Assistance - met 10/5  4. Bed to chair transfer with Moderate Assistance using LRAD or no AD.  5. Gait  x 20 feet with Moderate Assistance using LRAD or no Ad.  6. Ascend/descend 4 stair with bilateral Handrails Moderate Assistance using LRAD or no AD.  7. Stand for 8 minutes with Minimal Assistance using LRAD or no AD.  8. Lower extremity exercise program x20 reps per handout, with independence                       PLAN:    Patient to be seen 5 x/week  to address the above listed problems via gait training, therapeutic activities, therapeutic exercises, neuromuscular re-education  Plan of Care expires: 17  Plan of Care reviewed with: patient, mother          Aminata Hunter, PT  10/05/2017

## 2017-10-05 NOTE — SUBJECTIVE & OBJECTIVE
Interval History:   No acute events overnight.  Patient's mentation continues to improve.    Current Facility-Administered Medications   Medication    famotidine tablet 20 mg    fluconazole (DIFLUCAN) IVPB 400 mg 200 mL    heparin (porcine) injection 5,000 Units    [START ON 10/6/2017] lactulose 20 gram/30 mL solution Soln 10 g    lidocaine HCL 10 mg/ml (1%) 10 mg/mL (1 %) injection    ondansetron injection 4 mg    piperacillin-tazobactam 4.5 g in dextrose 5 % 100 mL IVPB (ready to mix system)    pneumoc 13-arianne conj-dip cr(PF) 0.5 mL    potassium chloride 10% solution 40 mEq    potassium chloride 10% solution 40 mEq    potassium chloride 10% solution 60 mEq    potassium, sodium phosphates 280-160-250 mg packet 2 packet    potassium, sodium phosphates 280-160-250 mg packet 2 packet    potassium, sodium phosphates 280-160-250 mg packet 2 packet    potassium, sodium phosphates 280-160-250 mg packet 2 packet    rifAXIMin tablet 550 mg       Objective:     Vital Signs (Most Recent):  Temp: 98.8 °F (37.1 °C) (10/05/17 0800)  Pulse: 104 (10/05/17 1300)  Resp: (!) 29 (10/05/17 1300)  BP: 121/65 (10/05/17 1300)  SpO2: 99 % (10/05/17 1300) Vital Signs (24h Range):  Temp:  [98.1 °F (36.7 °C)-98.8 °F (37.1 °C)] 98.8 °F (37.1 °C)  Pulse:  [103-113] 104  Resp:  [18-32] 29  SpO2:  [98 %-100 %] 99 %  BP: (110-150)/(56-81) 121/65     Weight: 83.4 kg (183 lb 13.8 oz) (10/05/17 1200)  Body mass index is 28.8 kg/m².    Physical Exam   Constitutional: No distress.   HENT:   Head: Normocephalic.   Eyes: Pupils are equal, round, and reactive to light. Scleral icterus is present.   Neck: Normal range of motion.   Cardiovascular:   Sinus tachycardia   Pulmonary/Chest: Effort normal and breath sounds normal.   Abdominal: Bowel sounds are normal. He exhibits distension. He exhibits no mass. There is no tenderness. There is no rebound and no guarding. No hernia.   Musculoskeletal: Normal range of motion. He exhibits no edema.    Neurological:   Oriented to self but not to year or place, Grade 1-2 encephalopathy    Skin: He is not diaphoretic.       MELD-Na score: 26 at 10/5/2017  2:39 AM  MELD score: 26 at 10/5/2017  2:39 AM  Calculated from:  Serum Creatinine: 1.2 mg/dL at 10/5/2017  2:39 AM  Serum Sodium: 138 mmol/L (Rounded to 137) at 10/5/2017  2:39 AM  Total Bilirubin: 29.8 mg/dL at 10/5/2017  2:39 AM  INR(ratio): 1.6 at 10/5/2017  2:39 AM  Age: 27 years    Significant Labs:  CBC:   Recent Labs  Lab 10/05/17  0239   WBC 29.96*   RBC 2.68*   HGB 10.3*   HCT 29.6*   *     CMP:   Recent Labs  Lab 10/05/17  0239   *   CALCIUM 8.2*   ALBUMIN 2.1*   PROT 5.6*      K 3.8   CO2 15*   *   BUN 33*   CREATININE 1.2   ALKPHOS 150*   ALT 66*   *   BILITOT 29.8*     Coagulation:   Recent Labs  Lab 09/28/17  1727  10/05/17  0239   INR 1.8*  < > 1.6*   APTT 32.1*  --   --    < > = values in this interval not displayed.    Significant Imaging:  No recent images to review

## 2017-10-05 NOTE — PROGRESS NOTES
"[facility]  Critical Care Medicine  Progress Note    Patient Name: Jhonny Diana  MRN: [id number]  Admission Date: 9/27/2017  Hospital Length of Stay: 8 days  Code Status: Full Code  Attending Provider: Sajan Sandra MD  Primary Care Provider: Provider Not[employer]   Principal Problem: Alcoholic hepatitis without ascites    Subjective:     HPI:  Mr. Diana is a 28 y/o male with hx of childhood asthma and heavy alcohol abuse (fifth of liquor daily since teenager, last drink 2.5 wks ago per mother) who was transferred to [facility] [place], [place] for higher level of care/liver transplant evaluation. Patient initially presented to OSH for a 2 mo hx of progressively worsening abdominal pain with associated fatigue, jaundice, and abdominal distention with ~20# weight gain. HPI given by patient's mother and OSH records as patient's participation in interview limited by previous administration of pain medication at OSH.     Per mother, patient did not have a fever, chills, dark stools, known sick contacts, nausea, vomiting, diarrhea, confusion, or HA. Reports family hx of alcoholic cirrhosis in father. Pt was recently diagnosed with alcoholic hepatitis during his admission at OSH on 9/6-9/15. During that hospitalization, patient was given ceftriaxone for SBP ppx, prednisone for alcoholic hepatitis, and diuresed. Pt was discharged and instructed to follow up with PCP and AA within 1 wk. After his discharge, patient returned to ED 4 days after for increased weight gain and worsening abdominal pain/jaundice. At OSH, patient was given fluids (for suspected pancreatitis), diuresed and given IV albumin. Two days prior to transfer to [facility] [place], [place], patient had an episode of hematemesis and underwent an EGD on 9/25 that revealed "1 sm varix in distal esophagus, no stigmata of bleeding, gastropathy of portal hypertension, and reflux esophagitis".  No further episodes of hematemesis noted since initial episode.       Recent labs at OSH: Na " 134, CO2 18, BUN 36, Cr 2, Tprot 5.9, albumin 1.9, Tbili 24, alk phos 83, , ALT51. Patient also had lipase 532 and ammonia 118 during his previous hospitalization at OSH. Labwork also notable for negative mitochondrial ab, sm muscle ab, and ceruloplasmin 22. Imaging at OSH revealing abdo U/S (9/20): hepatosplenomegaly, CT abdo w contrast: hepatomegaly with marked fatty infiltration of the liver and mild ascites (9/6). No CDs available for review.          Hospital/ICU Course:  Mr. Diana was admitted for decompensated alcoholic hepatitis with high MELD score, severe HE, and LAURA. Hepatology consulted and following to evaluate liver transplant candidacy. Patient not currently a transplant candidate due to recent alcohol use; recommending re-evaluation by addiction psych and social work when more medically stable and able to participate. Pt started on albumin and lasix for LAURA and possibility for HRS. However nephrology felt that LAURA is likely multifactorial: ATN given hypotensive episodes at OSH and toxic given severe hyperbilirubinemia; not truly HRS. Lactulose/rifaximin started for HE and mental status improving slowly. Mr. Diana has had significant ABD pain since admission with ABD distension. CT abdomen was significant for pericolonic fat stranding of right colon; no obstruction. Also with diffuse mesenteric edema. No significant ascites noted, no cholecystitis/cholelithiasis/ductal dilation. US liver with doppler showed appropriate vasculature.      Interval History/Significant Events: Patient's mental status appears to be improving    Review of Systems   Unable to perform ROS: Mental status change     Objective:     Vital Signs (Most Recent):  Temp: 98.8 °F (37.1 °C) (10/05/17 0800)  Pulse: 105 (10/05/17 0800)  Resp: (!) 25 (10/05/17 0800)  BP: 134/63 (10/05/17 0800)  SpO2: 99 % (10/05/17 0800) Vital Signs (24h Range):  Temp:  [98.1 °F (36.7 °C)-98.8 °F (37.1 °C)] 98.8 °F (37.1 °C)  Pulse:  [103-113]  105  Resp:  [16-32] 25  SpO2:  [98 %-100 %] 99 %  BP: (110-164)/(60-81) 134/63   Weight: 83.4 kg (183 lb 13.8 oz)  Body mass index is 28.8 kg/m².      Intake/Output Summary (Last 24 hours) at 10/05/17 0901  Last data filed at 10/05/17 0701   Gross per 24 hour   Intake          3221.67 ml   Output              700 ml   Net          2521.67 ml       Physical Exam   Constitutional: He appears well-developed. He is cooperative. He appears toxic.   HENT:   Head: Normocephalic.   Eyes: Pupils are equal, round, and reactive to light. Scleral icterus is present.   Neck: Normal range of motion.   Cardiovascular: Regular rhythm, normal heart sounds and normal pulses.  Tachycardia present.    Pulmonary/Chest: He has decreased breath sounds in the right lower field and the left lower field.   Abdominal: Normal appearance. He exhibits distension and ascites. Bowel sounds are decreased.   Musculoskeletal: Normal range of motion.   Neurological: He is alert. He has normal strength. GCS eye subscore is 4. GCS verbal subscore is 4. GCS motor subscore is 5.   Patient follows commands but needs restraints to prevent removal of NGT(removed x 2 in last 24hrs).   Also removed condom cath and does not urinate in a urinal  Reportedly more awake to day   Skin: Skin is warm and dry.   Old blister/ wart on right plantar surface of the foot near the big toe   Psychiatric: His affect is labile. His speech is delayed. Cognition and memory are impaired. He is inattentive.   Nursing note and vitals reviewed.      Vents:     Lines/Drains/Airways     Drain                 NG/OG Tube 09/30/17 1551 [place] sump 14 Fr. Right nostril 4 days          Peripheral Intravenous Line                 Peripheral IV - Single Lumen Left Hand -- days         Midline Catheter Insertion/Assessment  - Single Lumen 09/27/17 1800 Right 7 days              Significant Labs:    CBC/Anemia Profile:    Recent Labs  Lab 10/04/17  0247 10/05/17  0239   WBC 26.00* 29.96*   HGB  11.2* 10.3*   HCT 33.2* 29.6*   * 126*   * 110*   RDW 16.7* 14.6*        Chemistries:    Recent Labs  Lab 10/04/17  0534 10/04/17  1752 10/05/17  0239     --  138   K 3.4* 4.0 3.8   *  --  112*   CO2 14*  --  15*   BUN 32*  --  33*   CREATININE 1.3  --  1.2   CALCIUM 8.4*  --  8.2*   ALBUMIN 2.3*  --  2.1*   PROT 5.8*  --  5.6*   BILITOT 31.3*  --  29.8*   ALKPHOS 141*  --  150*   ALT 69*  --  66*   *  --  109*   MG  --   --  1.4*   PHOS  --   --  2.0*       All pertinent labs within the past 24 hours have been reviewed.    Significant Imaging:  I have reviewed and interpreted all pertinent imaging results/findings within the past 24 hours.    Assessment/Plan:     Psychiatric   Alcoholism /alcohol abuse    --reported about a fifth of whiskey daily since teenager  --appreciate psychiatry's and hepatology's assistance  --follow up PETH,  still no result today        Pulmonary   Pneumonia of both lower lobes due to infectious organism    --continue empiric zosyn, started 9/28  --on room air.  Chest xray grossly unchanged with bilateral patchy infiltrates        Renal/   Hypernatremia    --2/2 free water losses in stool  --DC D5W infusion     --enteral water not given due to bowel edema.   - Attempting Ice chips and tube feedings  --Trend BMPs        Metabolic acidosis with normal anion gap and bicarbonate losses    --2/2 diarrhea given lactulose  --will decrease frequency of lactulose as is making significant amount of stool   --continue to monitor         Increased anion gap metabolic acidosis    --Improving, continue to monitor  --suspect 2/2 uremia, lactate and beta hydroxybutyrate wnl.   --also with non anion gap acidosis due to bicarbonate losses from GI tract. See below             Acute renal failure with tubular necrosis    --appreciate nephrology's assistance  --suspect LAURA multifactorial: ischemic ATN given hypotension and injury due to hyperbilirubinemia. Felt less likely  to be HRS however have continued albumin with lasix  --overall, LAURA improving and making adequate urine  --trend BMP's, intake/output. Avoid nephrotoxic agents  --previous US renal with medical renal disease           Hematology   Coagulopathy    --suspect secondary to liver failure  --monitor for now        Oncology   Leukocytosis    -- WBCs increasing 22>24>26>29  -- Cx- NGTD  -- C diff negative  -- chest xray grossly unchanged, on room air.   -- continue pip/tazo  -- Start on Fluconazole per ID recs  --T max 98.6  -- Procal 0.87          Endocrine   Severe malnutrition    Patient is receiving tubes feeds and is tolerating well  -- Hepatology would like to continue tube feeds   -- Awake and passed swallow study but he will need to closely monitor mental status when providing oral nutrition  -- Ice chips and water. Held overnight for abdominal pain      Appreciate Nutrition recommendation/Intervention:   -- TF formula -Isosource 1.5 @ a goal rate of 60ml/hr    -- Adv diet to 2gm Na once safe for PO intake                 GI   * Alcoholic hepatitis without ascites    --appreciate hepatology's assistance  -- considering for transplant candidate   --continue supportive care for HE, LAURA     -MELD-Na score: 26 at 10/5/2017  2:39 AM  MELD score: 26 at 10/5/2017  2:39 AM  Calculated from:  Serum Creatinine: 1.2 mg/dL at 10/5/2017  2:39 AM  Serum Sodium: 138 mmol/L (Rounded to 137) at 10/5/2017  2:39 AM  Total Bilirubin: 29.8 mg/dL at 10/5/2017  2:39 AM  INR(ratio): 1.6 at 10/5/2017  2:39 AM  Age: 27 years   --OSH workup: negative jae ab, ceruloplasmin, and sm muscle ab; hepatitis panel negative         Acute alcoholic pancreatitis    -- Mentioned at OSH, lipase not elevated here.         Decompensated hepatic cirrhosis      --see above  MELD-Na score: 26 at 10/5/2017  2:39 AM  MELD score: 26 at 10/5/2017  2:39 AM  Calculated from:  Serum Creatinine: 1.2 mg/dL at 10/5/2017  2:39 AM  Serum Sodium: 138 mmol/L (Rounded to  137) at 10/5/2017  2:39 AM  Total Bilirubin: 29.8 mg/dL at 10/5/2017  2:39 AM  INR(ratio): 1.6 at 10/5/2017  2:39 AM  Age: 27 years        History of hematemesis    -- Pt had an episode of hematemesis at OSH and EGD that revealed 1 small varix, reflux esophagitis, and gastropathy of portal hypertension  --no evidence of bleeding currently.  --trend Hgbs        Hepatic encephalopathy    --continue lactulose, rifaxamin per NGT. Decrease lactulose to daily given multiple bowel movements.          Other   Anasarca    Improving per family  -- 2/2 liver disease and hypoalbuminemia  --plan as above           Critical Care Daily Checklist:    A: Awake: RASS Goal/Actual Goal: RASS Goal: 0-->alert and calm  Actual: Robert Agitation Sedation Scale (RASS): Drowsy   B: Spontaneous Breathing Trial Performed?     C: SAT & SBT Coordinated?  NA                     D: Delirium: CAM-ICU Overall CAM-ICU: Positive   E: Early Mobility Performed? Yes   F: Feeding Goal: Goals: Meet >80% of EEN/EPN  Status: Nutrition Goal Status: new   Current Diet Order   Procedures    Diet NPO Except for: Sips with Medication     Until abdo ultrasound is completed, then can start on sodium restricted/renal diet with 1L fluid restriction     Order Specific Question:   Except for     Answer:   Sips with Medication      AS: Analgesia/Sedation PRN   T: Thromboembolic Prophylaxis Heparin sq   H: HOB > 300 Yes   U: Stress Ulcer Prophylaxis (if needed) Pepcid   G: Glucose Control NA   B: Bowel Function Stool Occurrence: 1   I: Indwelling Catheter (Lines & Garcia) Necessity NA   D: De-escalation of Antimicrobials/Pharmacotherapies Zosyn and Fluconazole    Plan for the day/ETD Supportive care    Code Status:  Family/Goals of Care: Full Code  Updated mother at bedside     Critical Care Time: 45 minutes  Critical secondary to Patient has a condition that poses threat to life and bodily function: Acute Liver Failure      Critical care was time spent personally by  me on the following activities: development of treatment plan with patient or surrogate and bedside caregivers, discussions with consultants, evaluation of patient's response to treatment, examination of patient, ordering and performing treatments and interventions, ordering and review of laboratory studies, ordering and review of radiographic studies, pulse oximetry, re-evaluation of patient's condition. This critical care time did not overlap with that of any other provider or involve time for any procedures.     Fiona Winterbottom, APRN, CNS  Critical Care Medicine  [facility]

## 2017-10-05 NOTE — ASSESSMENT & PLAN NOTE
27 year old male with a history of alcohol abuse transferred from OSH for higher level of care. On admission his issues were alcoholic hepatitis, possible acute pancreatitis (4/5 BiSAP score, high mortality) MELD-Na score 34, acute renal injury, malnutrition, and debility.    Patient continues to improved as far as alertness/mentation and ability to work with physical therapy. Sodium and LAURA at baseline yet WBC continues to rise.    Recommendations:  -Continue Lactulose (can be titrated to ~ 3-5 BM per day) and Rifaximin  -Monitor for signs of infection, worsening WBC this AM but no clear source of infection, ID on board-recs appreciated  -Addiction psych to visit with him again ?next week, saw him today but he was still not completely oriented  -Obtain outside records from [facility] [place] and the [facility].

## 2017-10-05 NOTE — PROGRESS NOTES
Notified Fiona Winterbottom, NP that residuals per NGT =410ml. Residual tube feedings not returned to patient. Np in agreement  With intervention. Tube Feedings decreased to 10ml/hr. Will continue to monitor residuals.

## 2017-10-05 NOTE — PLAN OF CARE
Problem: Physical Therapy Goal  Goal: Physical Therapy Goal  Goals to be met by: 10/10/2017    Patient will increase functional independence with mobility by performin. Supine to sit with MInimal Assistance  2. Sit to supine with MInimal Assistance  3. Sit to stand transfer with Minimal Assistance - met 10/5  4. Bed to chair transfer with Moderate Assistance using LRAD or no AD.  5. Gait  x 20 feet with Moderate Assistance using LRAD or no Ad.  6. Ascend/descend 4 stair with bilateral Handrails Moderate Assistance using LRAD or no AD.  7. Stand for 8 minutes with Minimal Assistance using LRAD or no AD.  8. Lower extremity exercise program x20 reps per handout, with independence     Outcome: Ongoing (interventions implemented as appropriate)  Treatment completed. Goals updated and appropriate.

## 2017-10-05 NOTE — ASSESSMENT & PLAN NOTE
--appreciate hepatology's assistance  -- considering for transplant candidate   --continue supportive care for HE, LAURA     -MELD-Na score: 26 at 10/5/2017  2:39 AM  MELD score: 26 at 10/5/2017  2:39 AM  Calculated from:  Serum Creatinine: 1.2 mg/dL at 10/5/2017  2:39 AM  Serum Sodium: 138 mmol/L (Rounded to 137) at 10/5/2017  2:39 AM  Total Bilirubin: 29.8 mg/dL at 10/5/2017  2:39 AM  INR(ratio): 1.6 at 10/5/2017  2:39 AM  Age: 27 years   --OSH workup: negative jae ab, ceruloplasmin, and sm muscle ab; hepatitis panel negative

## 2017-10-05 NOTE — RESIDENT HANDOFF
"Handoff     Primary Team: [facility] [place] CRITICAL CARE MEDICINE Room Number: 3091/3091 A     Patient Name: Jhonny Diana MRN: [id number]     Date of Birth: [birth year] Allergies: Bactrim [sulfamethoxazole-trimethoprim]     Age: 27 y.o. Admit Date: 9/27/2017     Sex: male  BMI: Body mass index is 28.8 kg/m².     Code Status: Full Code        Illness Level (current clinical status): Watcher - YES  (AMS, aspiration risk)    Reason for Admission: Alcoholic hepatitis without ascites    HPI:  Mr. Diana is a 28 y/o male with hx of childhood asthma and heavy alcohol abuse (fifth of liquor daily since teenager, last drink 2.5 wks ago per mother) who was transferred to [facility] [place] for higher level of care/liver transplant evaluation. Patient initially presented to OSH for a 2 mo hx of progressively worsening abdominal pain with associated fatigue, jaundice, and abdominal distention with ~20# weight gain. HPI given by patient's mother and OSH records as patient's participation in interview limited by previous administration of pain medication at OSH.      Per mother, patient did not have a fever, chills, dark stools, known sick contacts, nausea, vomiting, diarrhea, confusion, or HA. Reports family hx of alcoholic cirrhosis in father. Pt was recently diagnosed with alcoholic hepatitis during his admission at OSH on 9/6-9/15. During that hospitalization, patient was given ceftriaxone for SBP ppx, prednisone for alcoholic hepatitis, and diuresed. Pt was discharged and instructed to follow up with PCP and AA within 1 wk. After his discharge, patient returned to ED 4 days after for increased weight gain and worsening abdominal pain/jaundice. At OSH, patient was given fluids (for suspected pancreatitis), diuresed and given IV albumin. Two days prior to transfer to [facility] [place] [facility], patient had an episode of hematemesis and underwent an EGD on 9/25 that revealed "1 sm varix in distal esophagus, no stigmata of bleeding, gastropathy of portal hypertension, and reflux " "esophagitis".  No further episodes of hematemesis noted since initial episode.         Recent labs at OSH: Na 134, CO2 18, BUN 36, Cr 2, Tprot 5.9, albumin 1.9, Tbili 24, alk phos 83, , ALT51. Patient also had lipase 532 and ammonia 118 during his previous hospitalization at OSH. Labwork also notable for negative mitochondrial ab, sm muscle ab, and ceruloplasmin 22. Imaging at OSH revealing abdo U/S (9/20): hepatosplenomegaly, CT abdo w contrast: hepatomegaly with marked fatty infiltration of the liver and mild ascites (9/6). No CDs available for review.          Hospital/ICU Course:  Mr. Diana was admitted for decompensated alcoholic hepatitis with high MELD score, severe HE, and LAURA. Hepatology consulted and following to evaluate liver transplant candidacy. Patient not currently a transplant candidate due to recent alcohol use; recommending re-evaluation by addiction psych and social work when more medically stable and able to participate. Pt started on albumin and lasix for LAURA and possibility for HRS. However nephrology felt that LAURA is likely multifactorial: ATN given hypotensive episodes at OSH and toxic given severe hyperbilirubinemia; not truly HRS. Lactulose/rifaximin started for HE and mental status improving slowly. Mr. Diana has had significant ABD pain since admission with ABD distension. CT abdomen was significant for pericolonic fat stranding of right colon; no obstruction. Also with diffuse mesenteric edema. No significant ascites noted, no cholecystitis/cholelithiasis/ductal dilation. US liver with doppler showed appropriate vasculature.         Tasks Follow up Psych and Hepatology recommendations     Contingency Plan: Call Critical Care Medicine for deterioration in condition    Estimated Discharge Date:  10/25/2017    Discharge Disposition: Home or Self Care     "

## 2017-10-05 NOTE — PROCEDURES
"Jhonny Diana is a 27 y.o. male patient.    Temp: 98.8 °F (37.1 °C) (10/05/17 0800)  Pulse: 104 (10/05/17 1300)  Resp: (!) 29 (10/05/17 1300)  BP: 121/65 (10/05/17 1300)  SpO2: 99 % (10/05/17 1300)  Weight: 83.4 kg (183 lb 13.8 oz) (10/05/17 1200)  Height: 5' 7" (170.2 cm) (10/05/17 1200)       Paracentesis  Date/Time: 10/5/2017 2:16 PM  Performed by: JAMISON ANGULO  Authorized by: JAMISON ANGULO   Consent Done: Yes  Consent given by: mother  Relevant documents: relevant documents present and verified  Site marked: the operative site was marked  Patient identity confirmed: , MRN and name  Time out: Immediately prior to procedure a "time out" was called to verify the correct patient, procedure, equipment, support staff and site/side marked as required.  Procedure purpose: diagnostic  Indications: suspected peritonitis    Anesthesia:  Local Anesthetic: lidocaine 1% without epinephrine  Patient sedated: no  Preparation: Patient was prepped and draped in the usual sterile fashion.  Description of findings: yellow-green fluid, clear   Ultrasound guidance: yes  Puncture site: right lower quadrant  Fluid removed: 60(ml)  Fluid appearance: clear  Complications: No  Estimated blood loss (mL): 0.1  Specimens: Yes  Implants: No          Jamison Angulo  10/5/2017  "

## 2017-10-05 NOTE — CONSULTS
"[facility]  Psychiatry  Consult Note    Patient Name: Jhonny Diana  MRN: [id number]   Code Status: Full Code  Admission Date: 9/27/2017  Hospital Length of Stay: 8 days  Attending Physician: Sajan Sandra MD  Primary Care Provider: Provider Allie    Current Legal Status: N/A    Patient information was obtained from patient and parent.   Consults  Subjective:     Principal Problem:Alcoholic hepatitis without ascites    Chief Complaint:  Alcohol Use Disorder     HPI: Per H&P: 28 yo male with hx of childhood asthma and heavy alcohol abuse (fifth of liquor daily since teenager, last drink 2.5 wks ago per mother) who was transferred to [facility] for higher level of care/liver transplant evaluation. Patient initially presented to OSH for a 2 mo hx of progressively worsening abdominal pain with associated fatigue, jaundice, and abdominal distention with ~20# weight gain. HPI given by patient's mother and OSH records as patient's participation in interview limited by previous administration of pain medication at OSH.     On interview: Pt jaundiced and acutely disoriented, unable to attend to interview, only moans in response to basic questioning. Obtained some collateral information per pt's mother and Aunt, who are present in the room. They state that pt has been drinking one fifth of whiskey since his teenage years, and they believe that he initially got started to treat "social anxiety." Pt has been seen by multiple psychiatrists in the past, all of which recommended alcohol rehab/cessation prior to addressing other psychiatric issues. Pt recently received diagnosis of alcoholic hepatitis earlier this month. Pt has not had significant periods of sobriety in the past. Has never been to residential rehab or participated in IOP. Pt went to AA for brief periods in the past but stopped because it was "too depressing."     Hospital Course: Interval History 10/4: Patient visited this morning.  He was " oriented to place but not to situation, self, or date.  He displayed trouble with word finding.  Perseverated on the removal of his NG tube.  Informed patient that he was improving but not ready to complete a psychiatric interview at this time.  Patient did not display understanding.     10/5: Patient visited this morning while he was undergoing a bedside ultrasound from the primary team.  Spoke to mother at bedside who feels patient's mental status is improving.  She continues to express concern that patient is not oriented to the date.  He told a nurse yesterday that his mother was a doctor who kept bothering him.  Patient also told long winded stories about the [employer] book series last night.  He is better oriented to situation, as he understands he is in the hospital and is very sick.         Patient History           Medical as of 10/5/2017     Past Medical History     Diagnosis Date Comments Source    Hypertension -- -- Provider                  Surgical as of 10/5/2017     Past Surgical History     Procedure Laterality Date Comments Source    APPENDECTOMY -- -- -- Provider                  Family as of 10/5/2017    **None**           Tobacco Use as of 10/5/2017     Smoking Status Smoking Start Date Smoking Quit Date Packs/day Years Used    Former Smoker -- -- -- --    Types Comments Smokeless Tobacco Status Smokeless Tobacco Quit Date Source     Cigarettes -- Never Used -- Provider            Alcohol Use as of 10/5/2017     Alcohol Use Drinks/Week Alcohol/Week Comments Source    Yes -- -- a fifth of liquor daily for years, cut back over last 2 months Provider            Drug Use as of 10/5/2017     Drug Use Types Frequency Comments Source    No -- -- -- Provider            Sexual Activity as of 10/5/2017     Sexually Active Birth Control Partners Comments Source    -- -- -- -- Provider            Activities of Daily Living as of 10/5/2017    **None**           Social Documentation as of 10/5/2017   "  **None**           Occupational as of 10/5/2017    **None**           Socioeconomic as of 10/5/2017     Marital Status Spouse Name Number of Children Years Education Preferred Language Ethnicity Race Source    Single -- -- -- English /White White --         Pertinent History Q A Comments    as of 10/5/2017 Lives with      Place in Birth Order      Lives in      Number of Siblings      Raised by      Legal Involvement      Childhood Trauma      Criminal History of      Financial Status      Highest Level of Education      Does patient have access to a firearm?       Service      Primary Leisure Activity      Spirituality         Review of patient's allergies indicates:   Allergen Reactions    Bactrim [sulfamethoxazole-trimethoprim] Other (See Comments)     Mookie Trell Syndrome       No current facility-administered medications on file prior to encounter.      No current outpatient prescriptions on file prior to encounter.     Psychotherapeutics     None        Review of Systems  Objective:     Vital Signs (Most Recent):  Temp: 98.8 °F (37.1 °C) (10/05/17 0800)  Pulse: 105 (10/05/17 0800)  Resp: (!) 25 (10/05/17 0800)  BP: 134/63 (10/05/17 0800)  SpO2: 99 % (10/05/17 0800) Vital Signs (24h Range):  Temp:  [98.1 °F (36.7 °C)-98.8 °F (37.1 °C)] 98.8 °F (37.1 °C)  Pulse:  [103-113] 105  Resp:  [16-32] 25  SpO2:  [98 %-100 %] 99 %  BP: (110-164)/(60-81) 134/63     Height: 5' 7" (170.2 cm)  Weight: 83.4 kg (183 lb 13.8 oz)  Body mass index is 28.8 kg/m².      Intake/Output Summary (Last 24 hours) at 10/05/17 0857  Last data filed at 10/05/17 0701   Gross per 24 hour   Intake          3361.67 ml   Output              700 ml   Net          2661.67 ml       Physical Exam  Mental Status Exam:  Appearance: disheveled, malnourished, lying in bed, jaundiced  Behavior/Cooperation: limited/ appropriate restless and fidgety , eye contact minimal, easily distracted  Speech: appropriate rate, volume and tone " articulation error, increased latency of response  Language: uses words appropriately; NO aphasia or dysarthria  Mood: neutral  Affect:  congruent with mood and appropriate to situation/content   Thought Process: tangential  Thought Content: normal, no suicidality, no homicidality, delusions, or paranoia  Level of Consciousness: Alert, oriented to place, situation, self, was not oriented to time  Memory:  Grossly impaired  Attention/concentration: easily distractable   Fund of Knowledge: unable to assess  Insight:  Impaired,  patient continues to struggle with orientation questions and believes his mother is his doctor  Judgment: Impaired, but improving.  Patient is less combative regarding instructions for his care from his treatment team.        Significant Labs:   Last 24 Hours:   Recent Lab Results       10/05/17  0239 10/04/17  1752 10/04/17  1642      Albumin 2.1(L)       Alkaline Phosphatase 150(H)       ALT 66(H)       Anion Gap 11       Aniso Slight       (H)       BANDS 0.5       Baso # CANCELED  Comment:  Result canceled by the ancillary       Basophil% 0.5       Bilirubin, Direct >14.0(H)       Total Bilirubin 29.8  Comment:  For infants and newborns, interpretation of results should be based  on gestational age, weight and in agreement with clinical  observations.  Premature Infant recommended reference ranges:  Up to 24 hours.............<8.0 mg/dL  Up to 48 hours............<12.0 mg/dL  3-5 days..................<15.0 mg/dL  6-29 days.................<15.0 mg/dL  (H)       BUN, Bld 33(H)       [place] Cells Moderate       Calcium 8.2(L)       Chloride 112(H)       CO2 15(L)       Creatinine 1.2       Differential Method Manual       eGFR if  >60.0       eGFR if non  >60.0  Comment:  Calculation used to obtain the estimated glomerular filtration  rate (eGFR) is the CKD-EPI equation. Since race is unknown   in our information system, the eGFR values for    -American and Non--American patients are given   for each creatinine result.         Eos # CANCELED  Comment:  Result canceled by the ancillary       Eosinophil% 0.0       Glucose 120(H)       Gran% 89.0(H)       Hematocrit 29.6(L)       Hemoglobin 10.3(L)       Coumadin Monitoring INR 1.6  Comment:  Coumadin Therapy:  2.0 - 3.0 for INR for all indicators except mechanical heart valves  and antiphospholipid syndromes which should use 2.5 - 3.5.  (H)       Lactate, Felix   1.9  Comment:  Falsely low lactic acid results can be found in samples   containing >=13.0 mg/dL total bilirubin and/or >=3.5 mg/dL   direct bilirubin.       Lymph # CANCELED  Comment:  Result canceled by the ancillary       Lymph% 6.5(L)       Magnesium 1.4(L)       MCH 38.4(H)       MCHC 34.8       (H)       Mono # CANCELED  Comment:  Result canceled by the ancillary       Mono% 3.5(L)       MPV 11.1       Phosphorus 2.0(L)       Platelet Estimate Decreased(A)       Platelets 126(L)       Poik Moderate       Poly Occasional       Potassium 3.8 4.0      Total Protein 5.6(L)       Protime 16.6(H)       RBC 2.68(L)       RDW 14.6(H)       Sodium 138       WBC 29.96(H)             Significant Imaging: None    Assessment/Plan:     * Alcoholic hepatitis without ascites    - Unable to perform risk assessment at this time due to pt's current mental status  - In the event that liver transplant is performed emergently during this hospitalization, we will provide recommendations for rehab/sobriety on the back end  - Will continue to follow and provide recommendations once pt's mentation improves               Monisha Ortiz MD  [employer] PGY2  [phone]      Patient seen and examined with resident agree with assessment and plan as documented above.     Edmar Win MD  [facility]

## 2017-10-05 NOTE — ASSESSMENT & PLAN NOTE
26yo man w/a history of childhood asthma and alcohol abuse (fifth whiskey daily >10yrs, active through admission) who was admitted on 9/27/2017 as a transfer from an OSH (first admitted 9/6) with 6-8wk progressively worsening fatigue, jaundice, abdominal pain, distension, BLE edema, and 20lb weight gain due to decompensated liver failure and recently diagnosed alcoholic cirrhosis (c/b HE, EV, portal HTN, scant ascites, no SBP, HRS). ID has been consulted to comment on a persistent leukocytosis and transplant candidacy. He remains critically ill at this time.    - may continue zosyn for now but will likely deescalate soon (well covered for possibility of aspiration seen on imaging)  - with decline, would consider empiric addition of fluconazole  - workup for leukocytosis has been appropriate and agree with hepatology that this may well be due to advanced liver failure   - transplant prep: will review serologic workup when finalized as much is still pending; will need vaccines updated including HAV, HBV, prevnar, and influenza  - transplant candidacy: pending review of workup detailed above; await final psych evaluation

## 2017-10-05 NOTE — ASSESSMENT & PLAN NOTE
-- WBCs increasing 22>24>26>29  -- Cx- NGTD  -- C diff negative  -- chest xray grossly unchanged, on room air.   -- continue pip/tazo  -- Start on Fluconazole per ID recs  --T max 98.6  -- Procal 0.87

## 2017-10-05 NOTE — ASSESSMENT & PLAN NOTE
--see above  MELD-Na score: 26 at 10/5/2017  2:39 AM  MELD score: 26 at 10/5/2017  2:39 AM  Calculated from:  Serum Creatinine: 1.2 mg/dL at 10/5/2017  2:39 AM  Serum Sodium: 138 mmol/L (Rounded to 137) at 10/5/2017  2:39 AM  Total Bilirubin: 29.8 mg/dL at 10/5/2017  2:39 AM  INR(ratio): 1.6 at 10/5/2017  2:39 AM  Age: 27 years

## 2017-10-05 NOTE — ASSESSMENT & PLAN NOTE
--2/2 free water losses in stool  --DC D5W infusion     --enteral water not given due to bowel edema.   - Attempting Ice chips and tube feedings  --Trend BMPs

## 2017-10-05 NOTE — PLAN OF CARE
Problem: Patient Care Overview  Goal: Plan of Care Review  Outcome: Ongoing (interventions implemented as appropriate)  No acute events throughout the shift. Patient remains afebrile and confused at times, mumbles to himself most of shift. Patient tolerating room air and HR is sinus tach. Patient on 10cc tube feeds and had 5 bowel movements this shift. Electrolytes replaced this shift.   POC reviewed with patient and family, all questions and concerns addressed. Will continue to monitor.

## 2017-10-05 NOTE — PLAN OF CARE
Problem: Occupational Therapy Goal  Goal: Occupational Therapy Goal  Goals to be met by: 10/17/2017    Patient will increase functional independence with ADLs by performing:    UE Dressing with Supervision.  LE Dressing with Supervision.  Grooming while seated with Supervision.  Toileting from bedside commode with Supervision for hygiene and clothing management.   Sitting at edge of bed or bedside chair  x30 minutes with Modified [place].  Rolling to Bilateral with Supervision.   Supine to sit with Supervision.  Stand pivot transfers with Minimal Assistance.  Toilet transfer to bedside commode with Minimal Assistance.     Outcome: Ongoing (interventions implemented as appropriate)  Continue OT plan of care. Pt making progress with OT goals and outcomes

## 2017-10-05 NOTE — SUBJECTIVE & OBJECTIVE
Interval History: Slight improvement today per mother. Afebrile. WBC remains elevated.    Review of Systems   Unable to perform ROS: Mental status change     Objective:     Vital Signs (Most Recent):  Temp: 98.8 °F (37.1 °C) (10/05/17 0800)  Pulse: 103 (10/05/17 1500)  Resp: (!) 23 (10/05/17 1500)  BP: 138/67 (10/05/17 1500)  SpO2: 100 % (10/05/17 1400) Vital Signs (24h Range):  Temp:  [98.1 °F (36.7 °C)-98.8 °F (37.1 °C)] 98.8 °F (37.1 °C)  Pulse:  [103-113] 103  Resp:  [20-32] 23  SpO2:  [98 %-100 %] 100 %  BP: (110-150)/(56-81) 138/67     Weight: 83.4 kg (183 lb 13.8 oz)  Body mass index is 28.8 kg/m².    Estimated Creatinine Clearance: 94.6 mL/min (based on SCr of 1.2 mg/dL).    Physical Exam   Constitutional: He appears well-developed. He is cooperative.  Non-toxic appearance.   HENT:   Head: Normocephalic.   Eyes: Pupils are equal, round, and reactive to light. Scleral icterus is present.   Neck: Normal range of motion.   Cardiovascular: Regular rhythm, normal heart sounds and normal pulses.  Tachycardia present.    Pulmonary/Chest: He has decreased breath sounds in the right lower field and the left lower field.   Abdominal: Normal appearance. He exhibits distension. Bowel sounds are decreased.   Musculoskeletal: Normal range of motion.   Neurological: He is disoriented.   Patient follows commands but needs restraints to prevent removal of NGT(removed x 2 in last 24hrs).   Also removed condom cath and does not urinate in a urinal  Reportedly more awake to day   Skin: Skin is warm and dry.   Old blister/ wart on right plantar surface of the foot near the big toe   Psychiatric: His affect is labile. His speech is delayed. Cognition and memory are impaired. He is inattentive.   Nursing note and vitals reviewed.      Significant Labs:   CBC:   Recent Labs  Lab 10/04/17  0247 10/05/17  0239   WBC 26.00* 29.96*   HGB 11.2* 10.3*   HCT 33.2* 29.6*   * 126*     CMP:   Recent Labs  Lab 10/04/17  0534  10/04/17  1752 10/05/17  0239     --  138   K 3.4* 4.0 3.8   *  --  112*   CO2 14*  --  15*   *  --  120*   BUN 32*  --  33*   CREATININE 1.3  --  1.2   CALCIUM 8.4*  --  8.2*   PROT 5.8*  --  5.6*   ALBUMIN 2.3*  --  2.1*   BILITOT 31.3*  --  29.8*   ALKPHOS 141*  --  150*   *  --  109*   ALT 69*  --  66*   ANIONGAP 14  --  11   EGFRNONAA >60.0  --  >60.0       Significant Imaging: I have reviewed all pertinent imaging results/findings within the past 24 hours.     CT CAP: Hepatomegaly.      Small volume ascites, mesenteric edema, portal colopathy, anasarca and mild splenomegaly suggesting sequela of portal hypertension.      Patchy confluent groundglass opacities throughout both lower lungs, probably edema, aspiration, or pneumonia.    Prominent loops of small bowel containing fecal material mixed with scattered gas density suggesting slow transit.     Microbiology:  9/28 blood cx: negative   9/28 urine cx: negative  10/3 blood cx: negative  10/3 C.diff negative  10/4 SCrAg negative

## 2017-10-05 NOTE — PLAN OF CARE
Problem: Patient Care Overview  Goal: Plan of Care Review  Pt tolerated ice chips throughout day and TFs at 25cc with residuals less than 60cc. Orders placed to advance diet to liquid and increase TFs to 30cc/hr. Awaiting stepdown bed. Remains on RA, ST 100s on monitor. Remains restrained for safety pullingj/ attempt to remove NG this shift. Mother has remained at bedside. Afebrile. No new complaints this shift. Less BMs than yesterday, 4 this shift. Paracentesis performed this afternoon and fluid sent to lab for eval. Safety, comfort and modesty maintained. POC discussed at bedside with pt and mom

## 2017-10-05 NOTE — SUBJECTIVE & OBJECTIVE
Patient History           Medical as of 10/5/2017     Past Medical History     Diagnosis Date Comments Source    Hypertension -- -- Provider                  Surgical as of 10/5/2017     Past Surgical History     Procedure Laterality Date Comments Source    APPENDECTOMY -- -- -- Provider                  Family as of 10/5/2017    **None**           Tobacco Use as of 10/5/2017     Smoking Status Smoking Start Date Smoking Quit Date Packs/day Years Used    Former Smoker -- -- -- --    Types Comments Smokeless Tobacco Status Smokeless Tobacco Quit Date Source     Cigarettes -- Never Used -- Provider            Alcohol Use as of 10/5/2017     Alcohol Use Drinks/Week Alcohol/Week Comments Source    Yes -- -- a fifth of liquor daily for years, cut back over last 2 months Provider            Drug Use as of 10/5/2017     Drug Use Types Frequency Comments Source    No -- -- -- Provider            Sexual Activity as of 10/5/2017     Sexually Active Birth Control Partners Comments Source    -- -- -- -- Provider            Activities of Daily Living as of 10/5/2017    **None**           Social Documentation as of 10/5/2017    **None**           Occupational as of 10/5/2017    **None**           Socioeconomic as of 10/5/2017     Marital Status Spouse Name Number of Children Years Education Preferred Language Ethnicity Race Source    Single -- -- -- English /White White --         Pertinent History Q A Comments    as of 10/5/2017 Lives with      Place in Birth Order      Lives in      Number of Siblings      Raised by      Legal Involvement      Childhood Trauma      Criminal History of      Financial Status      Highest Level of Education      Does patient have access to a firearm?       Service      Primary Leisure Activity      Spirituality         Review of patient's allergies indicates:   Allergen Reactions    Bactrim [sulfamethoxazole-trimethoprim] Other (See Comments)     Mookie Trell Syndrome  "      No current facility-administered medications on file prior to encounter.      No current outpatient prescriptions on file prior to encounter.     Psychotherapeutics     None        Review of Systems  Objective:     Vital Signs (Most Recent):  Temp: 98.8 °F (37.1 °C) (10/05/17 0800)  Pulse: 105 (10/05/17 0800)  Resp: (!) 25 (10/05/17 0800)  BP: 134/63 (10/05/17 0800)  SpO2: 99 % (10/05/17 0800) Vital Signs (24h Range):  Temp:  [98.1 °F (36.7 °C)-98.8 °F (37.1 °C)] 98.8 °F (37.1 °C)  Pulse:  [103-113] 105  Resp:  [16-32] 25  SpO2:  [98 %-100 %] 99 %  BP: (110-164)/(60-81) 134/63     Height: 5' 7" (170.2 cm)  Weight: 83.4 kg (183 lb 13.8 oz)  Body mass index is 28.8 kg/m².      Intake/Output Summary (Last 24 hours) at 10/05/17 0857  Last data filed at 10/05/17 0701   Gross per 24 hour   Intake          3361.67 ml   Output              700 ml   Net          2661.67 ml       Physical Exam  Mental Status Exam:  Appearance: disheveled, malnourished, lying in bed, jaundiced  Behavior/Cooperation: limited/ appropriate restless and fidgety , eye contact minimal, easily distracted  Speech: appropriate rate, volume and tone articulation error, increased latency of response  Language: uses words appropriately; NO aphasia or dysarthria  Mood: neutral  Affect:  congruent with mood and appropriate to situation/content   Thought Process: tangential  Thought Content: normal, no suicidality, no homicidality, delusions, or paranoia  Level of Consciousness: Alert, oriented to place, situation, self, was not oriented to time  Memory:  Grossly impaired  Attention/concentration: easily distractable   Fund of Knowledge: unable to assess  Insight:  Impaired, patient continues to struggle with orientation questions and believes his mother is his doctor  Judgment: Impaired, but improving.  Patient is less combative regarding instructions for his care from his treatment team.        Significant Labs:   Last 24 Hours:   Recent Lab Results  "      10/05/17  0239 10/04/17  1752 10/04/17  1642      Albumin 2.1(L)       Alkaline Phosphatase 150(H)       ALT 66(H)       Anion Gap 11       Aniso Slight       (H)       BANDS 0.5       Baso # CANCELED  Comment:  Result canceled by the ancillary       Basophil% 0.5       Bilirubin, Direct >14.0(H)       Total Bilirubin 29.8  Comment:  For infants and newborns, interpretation of results should be based  on gestational age, weight and in agreement with clinical  observations.  Premature Infant recommended reference ranges:  Up to 24 hours.............<8.0 mg/dL  Up to 48 hours............<12.0 mg/dL  3-5 days..................<15.0 mg/dL  6-29 days.................<15.0 mg/dL  (H)       BUN, Bld 33(H)       [place] Cells Moderate       Calcium 8.2(L)       Chloride 112(H)       CO2 15(L)       Creatinine 1.2       Differential Method Manual       eGFR if  >60.0       eGFR if non  >60.0  Comment:  Calculation used to obtain the estimated glomerular filtration  rate (eGFR) is the CKD-EPI equation. Since race is unknown   in our information system, the eGFR values for   -American and Non--American patients are given   for each creatinine result.         Eos # CANCELED  Comment:  Result canceled by the ancillary       Eosinophil% 0.0       Glucose 120(H)       Gran% 89.0(H)       Hematocrit 29.6(L)       Hemoglobin 10.3(L)       Coumadin Monitoring INR 1.6  Comment:  Coumadin Therapy:  2.0 - 3.0 for INR for all indicators except mechanical heart valves  and antiphospholipid syndromes which should use 2.5 - 3.5.  (H)       Lactate, Felix   1.9  Comment:  Falsely low lactic acid results can be found in samples   containing >=13.0 mg/dL total bilirubin and/or >=3.5 mg/dL   direct bilirubin.       Lymph # CANCELED  Comment:  Result canceled by the ancillary       Lymph% 6.5(L)       Magnesium 1.4(L)       MCH 38.4(H)       MCHC 34.8       (H)       Mono #  CANCELED  Comment:  Result canceled by the ancillary       Mono% 3.5(L)       MPV 11.1       Phosphorus 2.0(L)       Platelet Estimate Decreased(A)       Platelets 126(L)       Poik Moderate       Poly Occasional       Potassium 3.8 4.0      Total Protein 5.6(L)       Protime 16.6(H)       RBC 2.68(L)       RDW 14.6(H)       Sodium 138       WBC 29.96(H)             Significant Imaging: None

## 2017-10-05 NOTE — PT/OT/SLP PROGRESS
"Occupational Therapy  Treatment    Jhonny Diana   MRN: [id number]   Admitting Diagnosis: Alcoholic hepatitis with ascites    OT Date of Treatment: 10/05/17   OT Start Time: 0940  OT Stop Time: 1018  OT Total Time (min): 38 min    Billable Minutes:  Self Care/Home Management 30 and Therapeutic Activity 8    General Precautions: Standard, fall    Subjective:  Communicated with RN prior to session.  " Ill try" patient agreeable to OT tx visit   Pain/Comfort  Pain Rating 1: 0/10    Objective:  Patient found reclined in bed with: blood pressure cuff, telemetry, bowel management system, pulse ox (continuous), NG tube, peripheral Iv. Supportive mother at bedside      Functional Mobility:  Bed Mobility:  Scooting/Bridging: Maximum Assistance  Supine to Sit: Maximum Assistance    Transfers:   Sit <> Stand Assistance: Minimum Assistance  Sit <> Stand Assistive Device: Rolling Walker  Bed <> Chair Technique: Stand Pivot  Bed <> Chair Transfer Assistance: Moderate Assistance (2 person assist)  Bed <> Chair Assistive Device: Rolling Walker    Functional Ambulation:  Pt walked 20 feet in room with Mod assist ( 2 person assist) with use of RW    Activities of Daily Living:  UE Dressing Level of Assistance: Maximum assistance  LE Dressing Level of Assistance: Maximum assistance (donning socks )  Grooming Position: Seated (washing hands sitting EOB)  Grooming Level of Assistance: Minimum assistance  Toileting Where Assessed:  (standing with urinal )  Toileting Level of Assistance: Total assistance    Balance:   Static Sit: fair   Dynamic Sit: fair-   Static Stand: fair-  Dynamic stand: Poor plus     Therapeutic Activities and Exercises:  Bed mobility, sit to stand, bed to chair, functional mobility retraining   Pt educated on role of OT, plan of care, safety awareness, DME, importance of out of bed activity, energy conservation techniques       AM-PAC 6 CLICK ADL   How much help from another person does this patient currently need?   1 " "= Unable, Total/Dependent Assistance  2 = A lot, Maximum/Moderate Assistance  3 = A little, Minimum/Contact Guard/Supervision  4 = None, Modified [place]/Independent    Putting on and taking off regular lower body clothing? : 2  Bathing (including washing, rinsing, drying)?: 2  Toileting, which includes using toilet, bedpan, or urinal? : 1  Putting on and taking off regular upper body clothing?: 2  Taking care of personal grooming such as brushing teeth?: 2  Eating meals?: 3  Total Score: 12     AM-PAC Raw Score CMS "G-Code Modifier Level of Impairment Assistance   6 % Total / Unable   7 - 8 CM 80 - 100% Maximal Assist   9-13 CL 60 - 80% Moderate Assist   14 - 19 CK 40 - 60% Moderate Assist   20 - 22 CJ 20 - 40% Minimal Assist   23 CI 1-20% SBA / CGA   24 CH 0% Independent/ Mod I       Patient left up in chair with all lines intact, call button in reach, RN notified and mother present wrist restraints applied in recliner chair per RN's request     ASSESSMENT:  Jhonny Diana is a 27 y.o. male with a medical diagnosis of Alcoholic hepatitis with ascites and presents with Decreased activity tolerance, generalized weakness impacting indep and safety with ADL/ Self care and functional mobility. Pt is showing improved overall cognitive status and alertness this visit. Able to engage in conversation with therapists this visit. Pt making improvement with functional transfer and functional mobility this session.  Pt will benefit from skilled OT services to maximize independence and safety with ADL/Self care and functional mobility       Rehab identified problem list/impairments: Rehab identified problem list/impairments: weakness, impaired endurance, impaired self care skills, impaired functional mobilty, gait instability, impaired balance, impaired cognition, impaired cardiopulmonary response to activity, edema    Rehab potential is good.    Activity tolerance: Good    Discharge recommendations: Discharge " Facility/Level Of Care Needs: rehabilitation facility     Barriers to discharge: Barriers to Discharge: Inaccessible home environment (4 ALEXA)    Equipment recommendations:  (will continue to assess pending progress )     GOALS:    Occupational Therapy Goals        Problem: Occupational Therapy Goal    Goal Priority Disciplines Outcome Interventions   Occupational Therapy Goal     OT, PT/OT Ongoing (interventions implemented as appropriate)    Description:  Goals to be met by: 10/17/2017    Patient will increase functional independence with ADLs by performing:    UE Dressing with Supervision.  LE Dressing with Supervision.  Grooming while seated with Supervision.  Toileting from bedside commode with Supervision for hygiene and clothing management.   Sitting at edge of bed or bedside chair  x30 minutes with Modified Hall.  Rolling to Bilateral with Supervision.   Supine to sit with Supervision.  Stand pivot transfers with Minimal Assistance.  Toilet transfer to bedside commode with Minimal Assistance.                      Plan:  Patient to be seen 5 x/week to address the above listed problems via self-care/home management, therapeutic activities, therapeutic exercises  Plan of Care expires: 10/17/17  Plan of Care reviewed with: patient, mother         Hind S KIRTI Chairez  10/05/2017

## 2017-10-06 PROBLEM — E87.0 HYPERNATREMIA: Status: RESOLVED | Noted: 2017-10-02 | Resolved: 2017-10-06

## 2017-10-06 LAB
ALBUMIN SERPL BCP-MCNC: 2.1 G/DL
ALP SERPL-CCNC: 155 U/L
ALT SERPL W/O P-5'-P-CCNC: 65 U/L
AMYLASE, BODY FLUID: 16 U/L
ANION GAP SERPL CALC-SCNC: 13 MMOL/L
ANISOCYTOSIS BLD QL SMEAR: SLIGHT
AST SERPL-CCNC: 110 U/L
BASOPHILS # BLD AUTO: ABNORMAL K/UL
BASOPHILS NFR BLD: 0 %
BILIRUB DIRECT SERPL-MCNC: >14 MG/DL
BILIRUB SERPL-MCNC: 29.8 MG/DL
BODY FLUID SOURCE AMYLASE: NORMAL
BUN SERPL-MCNC: 35 MG/DL
BURR CELLS BLD QL SMEAR: ABNORMAL
CALCIUM SERPL-MCNC: 8.1 MG/DL
CHLORIDE SERPL-SCNC: 111 MMOL/L
CMV IGG SERPL QL IA: REACTIVE
CO2 SERPL-SCNC: 12 MMOL/L
CREAT SERPL-MCNC: 1.3 MG/DL
DIFFERENTIAL METHOD: ABNORMAL
EOSINOPHIL # BLD AUTO: ABNORMAL K/UL
EOSINOPHIL NFR BLD: 0 %
ERYTHROCYTE [DISTWIDTH] IN BLOOD BY AUTOMATED COUNT: 14.6 %
EST. GFR  (AFRICAN AMERICAN): >60 ML/MIN/1.73 M^2
EST. GFR  (NON AFRICAN AMERICAN): >60 ML/MIN/1.73 M^2
GLUCOSE SERPL-MCNC: 113 MG/DL
HCT VFR BLD AUTO: 28.7 %
HGB BLD-MCNC: 10.1 G/DL
HSV1 IGG SERPL QL IA: NEGATIVE
HSV2 IGG SERPL QL IA: NEGATIVE
INR PPP: 1.5
LYMPHOCYTES # BLD AUTO: ABNORMAL K/UL
LYMPHOCYTES NFR BLD: 3 %
MAGNESIUM SERPL-MCNC: 2.3 MG/DL
MCH RBC QN AUTO: 38.4 PG
MCHC RBC AUTO-ENTMCNC: 35.2 G/DL
MCV RBC AUTO: 109 FL
MONOCYTES # BLD AUTO: ABNORMAL K/UL
MONOCYTES NFR BLD: 1 %
NEUTROPHILS NFR BLD: 96 %
PHOSPHATE SERPL-MCNC: 3.1 MG/DL
PLATELET # BLD AUTO: 123 K/UL
PLATELET BLD QL SMEAR: ABNORMAL
PMV BLD AUTO: 11 FL
POIKILOCYTOSIS BLD QL SMEAR: SLIGHT
POTASSIUM SERPL-SCNC: 4.4 MMOL/L
PROT SERPL-MCNC: 5.7 G/DL
PROTHROMBIN TIME: 14.9 SEC
RBC # BLD AUTO: 2.63 M/UL
RPR SER QL: NORMAL
SODIUM SERPL-SCNC: 136 MMOL/L
WBC # BLD AUTO: 31.89 K/UL

## 2017-10-06 PROCEDURE — 85027 COMPLETE CBC AUTOMATED: CPT

## 2017-10-06 PROCEDURE — 87077 CULTURE AEROBIC IDENTIFY: CPT

## 2017-10-06 PROCEDURE — 20600001 HC STEP DOWN PRIVATE ROOM

## 2017-10-06 PROCEDURE — 84100 ASSAY OF PHOSPHORUS: CPT

## 2017-10-06 PROCEDURE — 85007 BL SMEAR W/DIFF WBC COUNT: CPT

## 2017-10-06 PROCEDURE — 86695 HERPES SIMPLEX TYPE 1 TEST: CPT

## 2017-10-06 PROCEDURE — 87186 SC STD MICRODIL/AGAR DIL: CPT

## 2017-10-06 PROCEDURE — 87070 CULTURE OTHR SPECIMN AEROBIC: CPT

## 2017-10-06 PROCEDURE — 86682 HELMINTH ANTIBODY: CPT

## 2017-10-06 PROCEDURE — 25000003 PHARM REV CODE 250: Performed by: CLINICAL NURSE SPECIALIST

## 2017-10-06 PROCEDURE — 83735 ASSAY OF MAGNESIUM: CPT

## 2017-10-06 PROCEDURE — 25000003 PHARM REV CODE 250: Performed by: NURSE PRACTITIONER

## 2017-10-06 PROCEDURE — 99232 SBSQ HOSP IP/OBS MODERATE 35: CPT | Mod: ,,, | Performed by: INTERNAL MEDICINE

## 2017-10-06 PROCEDURE — 25000003 PHARM REV CODE 250: Performed by: INTERNAL MEDICINE

## 2017-10-06 PROCEDURE — 86644 CMV ANTIBODY: CPT

## 2017-10-06 PROCEDURE — 63600175 PHARM REV CODE 636 W HCPCS: Performed by: CLINICAL NURSE SPECIALIST

## 2017-10-06 PROCEDURE — 86663 EPSTEIN-BARR ANTIBODY: CPT

## 2017-10-06 PROCEDURE — 87205 SMEAR GRAM STAIN: CPT

## 2017-10-06 PROCEDURE — 63600175 PHARM REV CODE 636 W HCPCS: Performed by: NURSE PRACTITIONER

## 2017-10-06 PROCEDURE — 86787 VARICELLA-ZOSTER ANTIBODY: CPT

## 2017-10-06 PROCEDURE — 97110 THERAPEUTIC EXERCISES: CPT

## 2017-10-06 PROCEDURE — 80048 BASIC METABOLIC PNL TOTAL CA: CPT

## 2017-10-06 PROCEDURE — 99233 SBSQ HOSP IP/OBS HIGH 50: CPT | Mod: ,,, | Performed by: HOSPITALIST

## 2017-10-06 PROCEDURE — 85610 PROTHROMBIN TIME: CPT

## 2017-10-06 PROCEDURE — 97530 THERAPEUTIC ACTIVITIES: CPT

## 2017-10-06 PROCEDURE — 86592 SYPHILIS TEST NON-TREP QUAL: CPT

## 2017-10-06 PROCEDURE — 86777 TOXOPLASMA ANTIBODY: CPT

## 2017-10-06 PROCEDURE — 80076 HEPATIC FUNCTION PANEL: CPT

## 2017-10-06 RX ORDER — SODIUM BICARBONATE 650 MG/1
650 TABLET ORAL 2 TIMES DAILY
Status: DISCONTINUED | OUTPATIENT
Start: 2017-10-06 | End: 2017-10-09

## 2017-10-06 RX ORDER — SODIUM BICARBONATE 650 MG/1
650 TABLET ORAL 2 TIMES DAILY
Status: DISCONTINUED | OUTPATIENT
Start: 2017-10-06 | End: 2017-10-06

## 2017-10-06 RX ADMIN — FAMOTIDINE 20 MG: 20 TABLET, FILM COATED ORAL at 09:10

## 2017-10-06 RX ADMIN — RIFAXIMIN 550 MG: 550 TABLET ORAL at 09:10

## 2017-10-06 RX ADMIN — SODIUM BICARBONATE 650 MG TABLET 650 MG: at 09:10

## 2017-10-06 RX ADMIN — HEPARIN SODIUM 5000 UNITS: 5000 INJECTION, SOLUTION INTRAVENOUS; SUBCUTANEOUS at 08:10

## 2017-10-06 RX ADMIN — RIFAXIMIN 550 MG: 550 TABLET ORAL at 08:10

## 2017-10-06 RX ADMIN — SODIUM BICARBONATE 650 MG TABLET 650 MG: at 08:10

## 2017-10-06 RX ADMIN — LACTULOSE 10 G: 20 SOLUTION ORAL at 09:10

## 2017-10-06 RX ADMIN — FAMOTIDINE 20 MG: 20 TABLET, FILM COATED ORAL at 08:10

## 2017-10-06 RX ADMIN — HEPARIN SODIUM 5000 UNITS: 5000 INJECTION, SOLUTION INTRAVENOUS; SUBCUTANEOUS at 02:10

## 2017-10-06 RX ADMIN — HEPARIN SODIUM 5000 UNITS: 5000 INJECTION, SOLUTION INTRAVENOUS; SUBCUTANEOUS at 04:10

## 2017-10-06 RX ADMIN — FLUCONAZOLE 400 MG: 2 INJECTION INTRAVENOUS at 09:10

## 2017-10-06 NOTE — NURSING TRANSFER
Nursing Transfer Note      10/5/2017     Transfer To: 1043 from 3091    Transfer via bed    Transfer with cardiac monitoring    Transported by RN    Medicines sent: N/A    Chart send with patient: Yes    Patient reassessed at: 10/5/17 2330 (date, time)    Upon arrival to floor: cardiac monitor applied, patient oriented to room, call bell in reach and bed in lowest position

## 2017-10-06 NOTE — PLAN OF CARE
Problem: Occupational Therapy Goal  Goal: Occupational Therapy Goal  Goals to be met by: 10/17/2017    Patient will increase functional independence with ADLs by performing:    UE Dressing with Supervision.  LE Dressing with Supervision.  Grooming while seated with Supervision.  Toileting from bedside commode with Supervision for hygiene and clothing management.   Sitting at edge of bed or bedside chair  x30 minutes with Modified [place].  Rolling to Bilateral with Supervision.   Supine to sit with Supervision.  Stand pivot transfers with Minimal Assistance.  Toilet transfer to bedside commode with Minimal Assistance.     Goals remain appropriate.

## 2017-10-06 NOTE — ASSESSMENT & PLAN NOTE
· Appreciate Hepatology assistance  · Continue Lactulose and Rifaximin for HE  · Currently not transplant candidate per Psych and Hep    MELD-Na score: 26 at 10/6/2017  4:34 AM  MELD score: 26 at 10/6/2017  4:34 AM  Calculated from:  Serum Creatinine: 1.3 mg/dL at 10/6/2017  4:34 AM  Serum Sodium: 136 mmol/L at 10/6/2017  4:34 AM  Total Bilirubin: 29.8 mg/dL at 10/6/2017  4:34 AM  INR(ratio): 1.5 at 10/6/2017  4:34 AM  Age: 27 years

## 2017-10-06 NOTE — SUBJECTIVE & OBJECTIVE
Interval History:  Patient transferred out of the ICU.  Mom concerned that patient was more confused this morning but overall stable.    Current Facility-Administered Medications   Medication    famotidine tablet 20 mg    fluconazole (DIFLUCAN) IVPB 400 mg 200 mL    heparin (porcine) injection 5,000 Units    lactulose 20 gram/30 mL solution Soln 10 g    ondansetron injection 4 mg    pneumoc 13-arianne conj-dip cr(PF) 0.5 mL    potassium chloride 10% solution 40 mEq    potassium chloride 10% solution 40 mEq    potassium chloride 10% solution 60 mEq    potassium, sodium phosphates 280-160-250 mg packet 2 packet    potassium, sodium phosphates 280-160-250 mg packet 2 packet    potassium, sodium phosphates 280-160-250 mg packet 2 packet    rifAXIMin tablet 550 mg    sodium bicarbonate tablet 650 mg       Objective:     Vital Signs (Most Recent):  Temp: 98.3 °F (36.8 °C) (10/06/17 1625)  Pulse: 103 (10/06/17 1625)  Resp: 20 (10/06/17 1625)  BP: 119/64 (10/06/17 1625)  SpO2: 99 % (10/06/17 1625) Vital Signs (24h Range):  Temp:  [98.1 °F (36.7 °C)-99 °F (37.2 °C)] 98.3 °F (36.8 °C)  Pulse:  [] 103  Resp:  [20-34] 20  SpO2:  [96 %-100 %] 99 %  BP: (115-147)/(53-71) 119/64     Weight: 83.6 kg (184 lb 4.9 oz) (10/06/17 0400)  Body mass index is 28.87 kg/m².    Physical Exam   Constitutional: No distress.   HENT:   Head: Normocephalic.   Eyes: Pupils are equal, round, and reactive to light. Scleral icterus is present.   Neck: Normal range of motion.   Cardiovascular:   Sinus tachycardia   Pulmonary/Chest: Effort normal and breath sounds normal.   Abdominal: Bowel sounds are normal. He exhibits distension. He exhibits no mass. There is no tenderness. There is no rebound and no guarding. No hernia.   Musculoskeletal: Normal range of motion. He exhibits no edema.   Neurological:   Oriented to self but not to year or place, Grade 1-2 encephalopathy    Skin: He is not diaphoretic.       MELD-Na score: 26 at  10/6/2017  4:34 AM  MELD score: 26 at 10/6/2017  4:34 AM  Calculated from:  Serum Creatinine: 1.3 mg/dL at 10/6/2017  4:34 AM  Serum Sodium: 136 mmol/L at 10/6/2017  4:34 AM  Total Bilirubin: 29.8 mg/dL at 10/6/2017  4:34 AM  INR(ratio): 1.5 at 10/6/2017  4:34 AM  Age: 27 years    Significant Labs:  CBC:   Recent Labs  Lab 10/06/17  0434   WBC 31.89*   RBC 2.63*   HGB 10.1*   HCT 28.7*   *     CMP:   Recent Labs  Lab 10/06/17  0434   *   CALCIUM 8.1*   ALBUMIN 2.1*   PROT 5.7*      K 4.4   CO2 12*   *   BUN 35*   CREATININE 1.3   ALKPHOS 155*   ALT 65*   *   BILITOT 29.8*     Coagulation:   Recent Labs  Lab 10/06/17  0434   INR 1.5*       Significant Imaging:  X-Ray: Reviewed

## 2017-10-06 NOTE — PROGRESS NOTES
Pt got up OOB to use urinal. Had moderate nose bleed after repositioned in bed. Nose no longer bleeding. MD paged to make aware. Will continue to monitor.

## 2017-10-06 NOTE — PROGRESS NOTES
"[facility] Hospital Medicine  Progress Note    Patient Name: Jhonny Diana  MRN: [id number]  Patient Class: IP- Inpatient   Admission Date: 9/27/2017  Length of Stay: 9 days  Attending Physician: Mi Braun MD  Primary Care Provider: Provider [facility] Medicine Team: [facility] HOSP MED S Mi Braun MD    Subjective:     Principal Problem:Alcoholic hepatitis with ascites    HPI:  26 yo male with hx of childhood asthma and heavy alcohol abuse (fifth of liquor daily since teenager, last drink 2.5 wks ago per mother) who was transferred to [facility] for higher level of care/liver transplant evaluation. Patient initially presented to OSH for a 2 mo hx of progressively worsening abdominal pain with associated fatigue, jaundice, and abdominal distention with ~20# weight gain. HPI given by patient's mother and OSH records as patient's participation in interview limited by previous administration of pain medication at OSH.      Per mother, patient did not have a fever, chills, dark stools, known sick contacts, nausea, vomiting, diarrhea, confusion, or HA. Reports family hx of alcoholic cirrhosis in father. Pt was recently diagnosed with alcoholic hepatitis during his admission at OSH on 9/6-9/15. During that hospitalization, patient was given ceftriaxone for SBP ppx, prednisone for alcoholic hepatitis, and diuresed. Pt was discharged and instructed to follow up with PCP and AA within 1 wk. After his discharge, patient returned to ED 4 days after for increased weight gain and worsening abdominal pain/jaundice. At OSH, patient was given fluids (for suspected pancreatitis), diuresed and given IV albumin. Two days prior to transfer to [facility], patient had an episode of hematemesis and underwent an EGD on 9/25 that revealed "1 sm varix in distal esophagus, no stigmata of bleeding, gastropathy of portal hypertension, and reflux esophagitis".  No further episodes of hematemesis noted since initial " episode.      Recent labs at OSH: Na 134, CO2 18, BUN 36, Cr 2, Tprot 5.9, albumin 1.9, Tbili 24, alk phos 83, , ALT51. Patient also had lipase 532 and ammonia 118 during his previous hospitalization at OSH. Labwork also notable for negative mitochondrial ab, sm muscle ab, and ceruloplasmin 22. Imaging at OSH revealing abdo U/S (9/20): hepatosplenomegaly, CT abdo w contrast: hepatomegaly with marked fatty infiltration of the liver and mild ascites (9/6). No CDs available for review.    Hospital Course:  Mr. Diana was admitted to the CMICU on 9/27 for decompensated alcoholic hepatitis with high MELD score, severe HE, and LAURA. Hepatology consulted and following to evaluate liver transplant candidacy. Patient not currently a transplant candidate due to recent alcohol use; recommending re-evaluation by addiction psych and social work when more medically stable and able to participate. Pt started on albumin and lasix for LAURA and possibility for HRS. However nephrology felt that LAURA is likely multifactorial: ATN given hypotensive episodes at OSH and toxic given severe hyperbilirubinemia; not truly HRS. Lactulose/rifaximin started for HE and mental status improving slowly. Mr. Diana has had significant ABD pain since admission with ABD distension. CT abdomen was significant for pericolonic fat stranding of right colon; no obstruction. Also with diffuse mesenteric edema. No significant ascites noted, no cholecystitis/cholelithiasis/ductal dilation. US liver with doppler showed appropriate vasculature.  He was stepped down to Hospital Medicine on 10/5.    Interval History: No events overnight.  Stepped down from the CMICU.  5 BMs overnight.   Mother at bedside today.  Oriented to person only.    Review of Systems   Unable to perform ROS: Mental status change     Objective:     Vital Signs (Most Recent):  Temp: 98.1 °F (36.7 °C) (10/06/17 1156)  Pulse: 100 (10/06/17 1156)  Resp: (!) 22 (10/06/17 1156)  BP: 125/61  (10/06/17 1156)  SpO2: 97 % (10/06/17 1156) Vital Signs (24h Range):  Temp:  [98 °F (36.7 °C)-99 °F (37.2 °C)] 98.1 °F (36.7 °C)  Pulse:  [100-109] 100  Resp:  [20-34] 22  SpO2:  [96 %-100 %] 97 %  BP: (115-147)/(53-71) 125/61     Weight: 83.6 kg (184 lb 4.9 oz)  Body mass index is 28.87 kg/m².    Intake/Output Summary (Last 24 hours) at 10/06/17 1340  Last data filed at 10/06/17 0906   Gross per 24 hour   Intake              680 ml   Output                0 ml   Net              680 ml      Physical Exam   Constitutional: He appears well-developed. He is cooperative.  Non-toxic appearance.   HENT:   Head: Normocephalic.   Eyes: Pupils are equal, round, and reactive to light. Scleral icterus is present.   Neck: Normal range of motion.   Cardiovascular: Regular rhythm, normal heart sounds and normal pulses.  Tachycardia present.    Pulmonary/Chest: He has decreased breath sounds in the right lower field and the left lower field.   Abdominal: Normal appearance. He exhibits distension. Bowel sounds are decreased.   Musculoskeletal: Normal range of motion.   Neurological: He is disoriented.   Patient follows commands but needs restraints to prevent removal of NGT  Oriented to person but not place or time   Skin: Skin is warm and dry.   Old blister/ wart on right plantar surface of the foot near the big toe   Psychiatric: His affect is labile. His speech is delayed. Cognition and memory are impaired. He is inattentive.   Nursing note and vitals reviewed.      Significant Labs:   CBC:   Recent Labs  Lab 10/05/17  0239 10/06/17  0434   WBC 29.96* 31.89*   HGB 10.3* 10.1*   HCT 29.6* 28.7*   * 123*     CMP:   Recent Labs  Lab 10/04/17  1752 10/05/17  0239 10/06/17  0434   NA  --  138 136   K 4.0 3.8 4.4   CL  --  112* 111*   CO2  --  15* 12*   GLU  --  120* 113*   BUN  --  33* 35*   CREATININE  --  1.2 1.3   CALCIUM  --  8.2* 8.1*   PROT  --  5.6* 5.7*   ALBUMIN  --  2.1* 2.1*   BILITOT  --  29.8* 29.8*   ALKPHOS  --   150* 155*   AST  --  109* 110*   ALT  --  66* 65*   ANIONGAP  --  11 13   EGFRNONAA  --  >60.0 >60.0     All pertinent labs within the past 24 hours have been reviewed.    Significant Imaging: I have reviewed and interpreted all pertinent imaging results/findings within the past 24 hours.    Assessment/Plan:      * Alcoholic hepatitis with ascites    · Appreciate Hepatology assistance  · Continue Lactulose and Rifaximin for HE  · Currently not transplant candidate per Psych and Hep    MELD-Na score: 26 at 10/6/2017  4:34 AM  MELD score: 26 at 10/6/2017  4:34 AM  Calculated from:  Serum Creatinine: 1.3 mg/dL at 10/6/2017  4:34 AM  Serum Sodium: 136 mmol/L at 10/6/2017  4:34 AM  Total Bilirubin: 29.8 mg/dL at 10/6/2017  4:34 AM  INR(ratio): 1.5 at 10/6/2017  4:34 AM  Age: 27 years            Coagulopathy    · 2/2 liver disease  · No bleeding; Monitor          Decompensated hepatic cirrhosis    See alcoholic hepatitis with ascites          History of hematemesis    · Pt had an episode of hematemesis at OSH and EGD that revealed 1 small varix, reflux esophagitis, and gastropathy of portal hypertension  · No evidence of bleeding currently and stable hemgolobin  · Continue Famotidine BID          Hepatic encephalopathy    · Continue Lactulose and Rifaxmin  · SLP following:  Continue NGT while altered          Alcoholism /alcohol abuse    · Reported about a fifth of whiskey daily since teenager  · Appreciate Psych and Hep assistance; currently high risk for liver transplant  · Follow up PETH        Metabolic acidosis with normal anion gap and bicarbonate losses    · 2/2 diarrhea given lactulose.  Currently on 10g daily with 5 BMs overnight  · Sodium Bicarb BID          Acute renal failure with tubular necrosis    · Appreciate Nephro's help  · Suspect LAURA multifactorial: ischemic ATN given hypotension and injury due to hyperbilirubinemia. Felt less likely to be HRS however have continued albumin with lasix  · Overall, LAURA  improving and making adequate urine.  Not measuring amounts as he pulls off condom catheter and is in diapers - but mother reports frequent wet diapers          Leukocytosis    · WBC remains elevated and continues to increase; 32 today  · Remains afebrile  · Repeat blood cultures NGTD  · UA negative  · Repeat CXR mora  · ID following, appreciate assistance.  Continue Zosyn and Fluconazole          Pneumonia of both lower lobes due to infectious organism    · Continue empiric Zosyn, started 9/28  · Satting well on room air          Severe malnutrition    · SLP and dietary following  · Continue NGT and TF          Anasarca    · 2/2 liver disease and hypoalbuminemia            VTE Risk Mitigation         Ordered     heparin (porcine) injection 5,000 Units  Every 8 hours     Route:  Subcutaneous        10/03/17 1740     Place sequential compression device  Until discontinued      09/28/17 0238     Medium Risk of VTE  Once      09/27/17 2337              Mi Braun MD  Department of Hospital Medicine   [facility]

## 2017-10-06 NOTE — ASSESSMENT & PLAN NOTE
27 year old male with a history of alcohol abuse transferred from OSH for higher level of care. On admission his issues were alcoholic hepatitis, possible acute pancreatitis (4/5 BiSAP score, high mortality) MELD-Na score 34, acute renal injury, malnutrition, and debility.    Recommendations:  - Monitor for signs of infection, worsening WBC this AM but no clear source of infection, from a hepatology standpoint we think WBC is secondary to his liver disease and not a true infection, ID on board therefore will defer ABX management to them   - Continue liquid diet as well as tube feeds in the setting of AH goal for protein and calories is the following calorie 30-40kcal/day and protein 1.5 gram/kg/day  - Continue Lactulose (can be titrated to ~ 3-5 BM per day) and Rifaximin  - Obtain therapeutic paracentesis today to see if this helps with distended and high residual  -Addiction psych to visit with him again ?next week  -Obtain outside records from [facility] [place] and [facility].

## 2017-10-06 NOTE — NURSING
Patient's mother stated when turning patient she accidentally pulled patient's NG tube. Tube placement after being pulled noted to be at 35 cm. Tube feedings paused and NG tube clamped. RN advanced NG tube to 55 cm where resistance was met. Dr. Braun paged and notified. Ordered STAT chest xray for placement confirmation.     Will continue to monitor.

## 2017-10-06 NOTE — ASSESSMENT & PLAN NOTE
· 2/2 diarrhea given lactulose.  Currently on 10g daily with 5 BMs overnight  · Sodium Bicarb BID

## 2017-10-06 NOTE — SUBJECTIVE & OBJECTIVE
Interval History: No events overnight.  Stepped down from the CMICU.  5 BMs overnight.   Mother at bedside today.  Oriented to person only.    Review of Systems   Unable to perform ROS: Mental status change     Objective:     Vital Signs (Most Recent):  Temp: 98.1 °F (36.7 °C) (10/06/17 1156)  Pulse: 100 (10/06/17 1156)  Resp: (!) 22 (10/06/17 1156)  BP: 125/61 (10/06/17 1156)  SpO2: 97 % (10/06/17 1156) Vital Signs (24h Range):  Temp:  [98 °F (36.7 °C)-99 °F (37.2 °C)] 98.1 °F (36.7 °C)  Pulse:  [100-109] 100  Resp:  [20-34] 22  SpO2:  [96 %-100 %] 97 %  BP: (115-147)/(53-71) 125/61     Weight: 83.6 kg (184 lb 4.9 oz)  Body mass index is 28.87 kg/m².    Intake/Output Summary (Last 24 hours) at 10/06/17 1340  Last data filed at 10/06/17 0906   Gross per 24 hour   Intake              680 ml   Output                0 ml   Net              680 ml      Physical Exam   Constitutional: He appears well-developed. He is cooperative.  Non-toxic appearance.   HENT:   Head: Normocephalic.   Eyes: Pupils are equal, round, and reactive to light. Scleral icterus is present.   Neck: Normal range of motion.   Cardiovascular: Regular rhythm, normal heart sounds and normal pulses.  Tachycardia present.    Pulmonary/Chest: He has decreased breath sounds in the right lower field and the left lower field.   Abdominal: Normal appearance. He exhibits distension. Bowel sounds are decreased.   Musculoskeletal: Normal range of motion.   Neurological: He is disoriented.   Patient follows commands but needs restraints to prevent removal of NGT  Oriented to person but not place or time   Skin: Skin is warm and dry.   Old blister/ wart on right plantar surface of the foot near the big toe   Psychiatric: His affect is labile. His speech is delayed. Cognition and memory are impaired. He is inattentive.   Nursing note and vitals reviewed.      Significant Labs:   CBC:   Recent Labs  Lab 10/05/17  0239 10/06/17  0434   WBC 29.96* 31.89*   HGB 10.3*  10.1*   HCT 29.6* 28.7*   * 123*     CMP:   Recent Labs  Lab 10/04/17  1752 10/05/17  0239 10/06/17  0434   NA  --  138 136   K 4.0 3.8 4.4   CL  --  112* 111*   CO2  --  15* 12*   GLU  --  120* 113*   BUN  --  33* 35*   CREATININE  --  1.2 1.3   CALCIUM  --  8.2* 8.1*   PROT  --  5.6* 5.7*   ALBUMIN  --  2.1* 2.1*   BILITOT  --  29.8* 29.8*   ALKPHOS  --  150* 155*   AST  --  109* 110*   ALT  --  66* 65*   ANIONGAP  --  11 13   EGFRNONAA  --  >60.0 >60.0     All pertinent labs within the past 24 hours have been reviewed.    Significant Imaging: I have reviewed and interpreted all pertinent imaging results/findings within the past 24 hours.

## 2017-10-06 NOTE — PT/OT/SLP PROGRESS
"Occupational Therapy  Treatment    Jhonny Diana   MRN: [id number]   Admitting Diagnosis: Alcoholic hepatitis with ascites    OT Date of Treatment: 10/06/17   OT Start Time: 0900  OT Stop Time: 0925  OT Total Time (min): 25 min    Billable Minutes:  Therapeutic Activity 15 and Therapeutic Exercise 15    General Precautions: Standard, fall  Orthopedic Precautions: N/A    Subjective:  Communicated with nsg prior to session.  Pt agreeable to therapy.   Pain/Comfort  Pain Rating 1: 0/10    Objective:   Pt found supine in bed with mother present.      Functional Mobility:  Bed Mobility:  Supine to Sit: Moderate Assistance    Transfers:   Sit <> Stand Assistance: Minimum Assistance  Sit <> Stand Assistive Device: No Assistive Device  Bed <> Chair Technique: Stand Pivot  Bed <> Chair Transfer Assistance: Minimum Assistance      Activities of Daily Living:     UE Dressing Level of Assistance: Maximum assistance  LE Dressing Level of Assistance: Maximum assistance    Pt demo Fair sitting balance and standing balance.  Pt completed B UE AROM exs 2x10 reps x 6 planes to increase functional endurance/ROM.       AM-PAC 6 CLICK ADL   How much help from another person does this patient currently need?   1 = Unable, Total/Dependent Assistance  2 = A lot, Maximum/Moderate Assistance  3 = A little, Minimum/Contact Guard/Supervision  4 = None, Modified [place]/Independent    Putting on and taking off regular lower body clothing? : 2  Bathing (including washing, rinsing, drying)?: 2  Toileting, which includes using toilet, bedpan, or urinal? : 1  Putting on and taking off regular upper body clothing?: 2  Taking care of personal grooming such as brushing teeth?: 2  Eating meals?: 3  Total Score: 12     AM-PAC Raw Score CMS "G-Code Modifier Level of Impairment Assistance   6 % Total / Unable   7 - 8 CM 80 - 100% Maximal Assist   9-13 CL 60 - 80% Moderate Assist   14 - 19 CK 40 - 60% Moderate Assist   20 - 22 CJ 20 - 40% Minimal " Assist   23 CI 1-20% SBA / CGA   24 CH 0% Independent/ Mod I       Patient left up in chair with all lines intact, call button in reach, nsg notified and mother present    ASSESSMENT:  Jhonny Diana is a 27 y.o. male with a medical diagnosis of Alcoholic hepatitis with ascites and tolerated session well with good effort and functional progress noted. Pt to benefit from cont OT to address stated goals.   Pt continues to have decreased functional strength/endurance which impacts functional mobility/ADL skills. .    Rehab identified problem list/impairments: Rehab identified problem list/impairments: weakness, gait instability, impaired balance, impaired endurance, impaired self care skills, impaired functional mobilty    Rehab potential is good.    Activity tolerance: Good    Discharge recommendations:   REHAB      GOALS:    Occupational Therapy Goals        Problem: Occupational Therapy Goal    Goal Priority Disciplines Outcome Interventions   Occupational Therapy Goal     OT, PT/OT Ongoing (interventions implemented as appropriate)    Description:  Goals to be met by: 10/17/2017    Patient will increase functional independence with ADLs by performing:    UE Dressing with Supervision.  LE Dressing with Supervision.  Grooming while seated with Supervision.  Toileting from bedside commode with Supervision for hygiene and clothing management.   Sitting at edge of bed or bedside chair  x30 minutes with Modified [place].  Rolling to Bilateral with Supervision.   Supine to sit with Supervision.  Stand pivot transfers with Minimal Assistance.  Toilet transfer to bedside commode with Minimal Assistance.                      Plan:  Patient to be seen 5 x/week to address the above listed problems via self-care/home management, therapeutic activities, therapeutic exercises  Plan of Care expires: 10/17/17  Plan of Care reviewed with: patient, mother         JULIETTE Gilbert  10/06/2017

## 2017-10-06 NOTE — PROGRESS NOTES
"Pt having new onset productive cough. Lung sounds clear. SATS 98% on RA. Pt reports "tickle in throat." MD paged. Ordered sputum culture. Reinforced to pt to keep HOB >45. Pt and mother verbalized understanding.   "

## 2017-10-06 NOTE — NURSING
Pt admitted to room via bed oriented to self only with no c/o pain and mother at bedside. Bed in low position with hob elevated and pt turned frequently. Pt incontinent both urine and stool. Pt has had 3 bms before transfer and 2 more by 0300.  Buttocks red but blanchable and intact. Barrier cream applied. bilat wrist restraints intact to prevent pt from pulling lines out and ngt. Tube feeds at 30cc/hr with goal of 60cc/hr. Pt jaundice and ascites. Telemetry maintained nst.

## 2017-10-06 NOTE — PROGRESS NOTES
Non-violent restraints due to  soon. Primary team paged twice. Awaiting response for new orders.    Will continue to monitor.

## 2017-10-06 NOTE — ASSESSMENT & PLAN NOTE
· WBC remains elevated and continues to increase; 32 today  · Remains afebrile  · Repeat blood cultures NGTD  · UA negative  · Repeat CXR mora  · ID following, appreciate assistance.  Continue Zosyn and Fluconazole

## 2017-10-06 NOTE — HOSPITAL COURSE
Mr. Diana was admitted to the CMICU on 9/27 for decompensated alcoholic hepatitis with high MELD score, severe HE, and LAURA. Hepatology consulted and following to evaluate liver transplant candidacy. Patient not currently a transplant candidate due to recent alcohol use; recommending re-evaluation by addiction psych and social work when more medically stable and able to participate. Pt started on albumin and lasix for LAURA and possibility for HRS. However nephrology felt that LAURA is likely multifactorial: ATN given hypotensive episodes at OSH and toxic given severe hyperbilirubinemia; not truly HRS. Lactulose/rifaximin started for HE and mental status improving slowly. Mr. Diana has had significant ABD pain since admission with ABD distension. CT abdomen was significant for pericolonic fat stranding of right colon; no obstruction. Also with diffuse mesenteric edema. No significant ascites noted, no cholecystitis/cholelithiasis/ductal dilation. US liver with doppler showed appropriate vasculature.  He was stepped down to Hospital Medicine on 10/5.  Paracentesis was done on 10/9 with 3.3L removed.  His diet was advanced and Dietary was consulted to help get adequate caloric intake.

## 2017-10-06 NOTE — HPI
"26 yo male with hx of childhood asthma and heavy alcohol abuse (fifth of liquor daily since teenager, last drink 2.5 wks ago per mother) who [place] [facility] [place] transplant evaluation. Patient initially presented to OSH for a 2 mo hx of progressively worsening abdominal pain with associated fatigue, jaundice, and abdominal distention with ~20# weight gain. HPI given by patient's mother and OSH records as patient's participation in interview limited by previous administration of pain medication at OSH.      Per mother, patient did not have a fever, chills, dark stools, known sick contacts, nausea, vomiting, diarrhea, confusion, or HA. Reports family hx of alcoholic cirrhosis in father. Pt was recently diagnosed with alcoholic hepatitis during his admission at OSH on 9/6-9/15. During that hospitalization, patient was given ceftriaxone for SBP ppx, prednisone for alcoholic hepatitis, and diuresed. Pt was discharged and instructed to follow up with PCP and AA within 1 wk. After his discharge, patient returned to ED 4 days after for increased weight gain and worsening abdominal pain/jaundice. At OSH, patient was given fluids (for suspected pancreatitis), diuresed and given IV albumin. Two [place] prior to [place] [facility] had an episode of hematemesis and underwent an EGD on 9/25 that revealed "1 sm varix in distal esophagus, no stigmata of bleeding, gastropathy of portal hypertension, and reflux esophagitis".  No further episodes of hematemesis noted since initial episode.      Recent labs at OSH: Na 134, CO2 18, BUN 36, Cr 2, Tprot 5.9, albumin 1.9, Tbili 24, alk phos 83, , ALT51. Patient also had lipase 532 and ammonia 118 during his previous hospitalization at OSH. Labwork also notable for negative mitochondrial ab, sm muscle ab, and ceruloplasmin 22. Imaging at OSH revealing abdo U/S (9/20): hepatosplenomegaly, CT abdo w contrast: hepatomegaly with marked fatty " infiltration of the liver and mild ascites (9/6). No CDs available for review.

## 2017-10-06 NOTE — ASSESSMENT & PLAN NOTE
26yo man w/a history of childhood asthma and alcohol abuse (fifth whiskey daily >10yrs, active through admission) who was admitted on 9/27/2017 as a transfer from an OSH (first admitted 9/6) with 6-8wk progressively worsening fatigue, jaundice, abdominal pain, distension, BLE edema, and 20lb weight gain due to decompensated liver failure and recently diagnosed alcoholic cirrhosis (c/b HE, EV, portal HTN, scant ascites, no SBP, HRS). ID has been consulted to comment on a persistent leukocytosis and transplant candidacy. He remains critically ill at this time.    Extensive infectious work-up with no localizing source - besides stigmata of liver failure, patient with no localizing signs or symptoms, blood / urine cultures negative, ascites fluid not consistent with SBP. CT A/P with groundglass opacities in lung, but patient already completed 7-days of pip-tazo for pneumonia coverage. Suspect leukocytosis in setting of alcoholic hepatitis superimposed on cirrhosis.    - Discontinue pip-tazo, fluconazole  - Monitor off antibiotics  - Transplant evaluation: serological work-up pending (particularly strongyloides and quant gold)  - Vaccines needed include HAV, HBV, prevnar, and influenza  - Transplant candidacy: pending review of workup detailed above; final psych evaluation

## 2017-10-06 NOTE — ASSESSMENT & PLAN NOTE
· Appreciate Nephro's help  · Suspect LAURA multifactorial: ischemic ATN given hypotension and injury due to hyperbilirubinemia. Felt less likely to be HRS however have continued albumin with lasix  · Overall, LAURA improving and making adequate urine.  Not measuring amounts as he pulls off condom catheter and is in diapers - but mother reports frequent wet diapers

## 2017-10-06 NOTE — ASSESSMENT & PLAN NOTE
· Pt had an episode of hematemesis at OSH and EGD that revealed 1 small varix, reflux esophagitis, and gastropathy of portal hypertension  · No evidence of bleeding currently and stable hemgolobin  · Continue Famotidine BID

## 2017-10-06 NOTE — PROGRESS NOTES
"[facility] [address]  Hepatology  Progress Note    Patient Name: Jhonny Diana  MRN: [id number]  Admission Date: 9/27/2017  Hospital Length of Stay: 9 days  Attending Provider: Mi Braun MD   Primary Care Physician: Provider Notinsystem  Principal Problem:Alcoholic hepatitis with ascites    Subjective:     Transplant status: No    HPI: 26 yo male with hx of childhood asthma and heavy alcohol abuse (fifth of liquor daily since teenager, last drink 2.5 wks ago per mother) who was transferred to [facility] for higher level of care/liver transplant evaluation.    History obtained per chart review and discussion with mother: Patient initially presented to OSH for a 2 mo hx of progressively worsening abdominal pain with associated fatigue, jaundice, and abdominal distention with ~20# weight gain.     The patient's mother states he began having abdominal pain about 6 months ago.  States he underwent many tests, all of which were inconclusive.  He then began to have abdominal swelling about two months ago and was in&out of the local ER.  He was still actively drinking up until about 2.5 weeks ago according to the mother but she is unsure of this.  At the OSH the patient underwent an EGD which showed g1EV.  He was also diagnosed with "pancreatitis" based on an elevated lipase.  The patient was also placed on prednisone given concerns for alcoholic hepatitis.           Interval History:  Patient transferred out of the ICU.  Mom concerned that patient was more confused this morning but overall stable.    Current Facility-Administered Medications   Medication    famotidine tablet 20 mg    fluconazole (DIFLUCAN) IVPB 400 mg 200 mL    heparin (porcine) injection 5,000 Units    lactulose 20 gram/30 mL solution Soln 10 g    ondansetron injection 4 mg    pneumoc 13-arianne conj-dip cr(PF) 0.5 mL    potassium chloride 10% solution 40 mEq    potassium chloride 10% solution 40 mEq    potassium chloride 10% solution 60 mEq "    potassium, sodium phosphates 280-160-250 mg packet 2 packet    potassium, sodium phosphates 280-160-250 mg packet 2 packet    potassium, sodium phosphates 280-160-250 mg packet 2 packet    rifAXIMin tablet 550 mg    sodium bicarbonate tablet 650 mg       Objective:     Vital Signs (Most Recent):  Temp: 98.3 °F (36.8 °C) (10/06/17 1625)  Pulse: 103 (10/06/17 1625)  Resp: 20 (10/06/17 1625)  BP: 119/64 (10/06/17 1625)  SpO2: 99 % (10/06/17 1625) Vital Signs (24h Range):  Temp:  [98.1 °F (36.7 °C)-99 °F (37.2 °C)] 98.3 °F (36.8 °C)  Pulse:  [] 103  Resp:  [20-34] 20  SpO2:  [96 %-100 %] 99 %  BP: (115-147)/(53-71) 119/64     Weight: 83.6 kg (184 lb 4.9 oz) (10/06/17 0400)  Body mass index is 28.87 kg/m².    Physical Exam   Constitutional: No distress.   HENT:   Head: Normocephalic.   Eyes: Pupils are equal, round, and reactive to light. Scleral icterus is present.   Neck: Normal range of motion.   Cardiovascular:   Sinus tachycardia   Pulmonary/Chest: Effort normal and breath sounds normal.   Abdominal: Bowel sounds are normal. He exhibits distension. He exhibits no mass. There is no tenderness. There is no rebound and no guarding. No hernia.   Musculoskeletal: Normal range of motion. He exhibits no edema.   Neurological:   Oriented to self but not to year or place, Grade 1-2 encephalopathy    Skin: He is not diaphoretic.       MELD-Na score: 26 at 10/6/2017  4:34 AM  MELD score: 26 at 10/6/2017  4:34 AM  Calculated from:  Serum Creatinine: 1.3 mg/dL at 10/6/2017  4:34 AM  Serum Sodium: 136 mmol/L at 10/6/2017  4:34 AM  Total Bilirubin: 29.8 mg/dL at 10/6/2017  4:34 AM  INR(ratio): 1.5 at 10/6/2017  4:34 AM  Age: 27 years    Significant Labs:  CBC:   Recent Labs  Lab 10/06/17  0434   WBC 31.89*   RBC 2.63*   HGB 10.1*   HCT 28.7*   *     CMP:   Recent Labs  Lab 10/06/17  0434   *   CALCIUM 8.1*   ALBUMIN 2.1*   PROT 5.7*      K 4.4   CO2 12*   *   BUN 35*   CREATININE 1.3    ALKPHOS 155*   ALT 65*   *   BILITOT 29.8*     Coagulation:   Recent Labs  Lab 10/06/17  0434   INR 1.5*       Significant Imaging:  X-Ray: Reviewed    Assessment/Plan:     Decompensated hepatic cirrhosis    27 year old male with a history of alcohol abuse transferred from H for higher level of care. On admission his issues were alcoholic hepatitis, possible acute pancreatitis (4/5 BiSAP score, high mortality) MELD-Na score 34, acute renal injury, malnutrition, and debility.    Recommendations:  - Monitor for signs of infection, worsening WBC this AM but no clear source of infection, from a hepatology standpoint we think WBC is secondary to his liver disease and not a true infection, ID on board therefore will defer ABX management to them   - Continue liquid diet as well as tube feeds in the setting of AH goal for protein and calories is the following calorie 30-40kcal/day and protein 1.5 gram/kg/day  - Continue Lactulose (can be titrated to ~ 3-5 BM per day) and Rifaximin  - Obtain therapeutic paracentesis today to see if this helps with distended and high residual  -Addiction psych to visit with him again ?next week  -Obtain outside [facility] from [facility] and the [facility].           Acute renal failure with tubular necrosis    Resolved            Thank you for your consult. I will follow-up with patient. Please contact us if you have any additional questions.    Divya Booker M.D.  Gastroenterology Fellow, PGY-IV  Pager: [phone]  [facility]

## 2017-10-06 NOTE — PROGRESS NOTES
NG tube advanced to 59cm marker.Residual 60cc prior to restarting. Restarted at 40ml/hr for a goal of 60ml/hr. Will continue to monitor.

## 2017-10-06 NOTE — PROGRESS NOTES
Resumed care of pt from A Prasanna RN. Pt oriented to room. Mother at bedside. Pt pleasant and appropriate in conversation. Agree with assessment findings. VSS. Will continue to monitor.

## 2017-10-06 NOTE — PROGRESS NOTES
Spoke to Dr. Pearce will verify placement of ng tube and place orders accordingly. Will continue to monitor.

## 2017-10-07 PROBLEM — J18.9 PNEUMONIA OF BOTH LOWER LOBES DUE TO INFECTIOUS ORGANISM: Status: RESOLVED | Noted: 2017-09-29 | Resolved: 2017-10-07

## 2017-10-07 LAB
ALBUMIN SERPL BCP-MCNC: 2 G/DL
ALP SERPL-CCNC: 170 U/L
ALT SERPL W/O P-5'-P-CCNC: 65 U/L
ANION GAP SERPL CALC-SCNC: 11 MMOL/L
AST SERPL-CCNC: 113 U/L
BASOPHILS # BLD AUTO: 0.03 K/UL
BASOPHILS NFR BLD: 0.1 %
BILIRUB DIRECT SERPL-MCNC: >14 MG/DL
BILIRUB SERPL-MCNC: 30.6 MG/DL
BUN SERPL-MCNC: 42 MG/DL
CALCIUM SERPL-MCNC: 8.1 MG/DL
CHLORIDE SERPL-SCNC: 107 MMOL/L
CO2 SERPL-SCNC: 13 MMOL/L
CREAT SERPL-MCNC: 1.4 MG/DL
DIFFERENTIAL METHOD: ABNORMAL
EBV EA AB TITR SER: 38.9 U/ML
EOSINOPHIL # BLD AUTO: 0.1 K/UL
EOSINOPHIL NFR BLD: 0.4 %
ERYTHROCYTE [DISTWIDTH] IN BLOOD BY AUTOMATED COUNT: 14.5 %
EST. GFR  (AFRICAN AMERICAN): >60 ML/MIN/1.73 M^2
EST. GFR  (NON AFRICAN AMERICAN): >60 ML/MIN/1.73 M^2
GLUCOSE SERPL-MCNC: 104 MG/DL
HCT VFR BLD AUTO: 28.4 %
HGB BLD-MCNC: 9.9 G/DL
INR PPP: 1.4
LYMPHOCYTES # BLD AUTO: 1.6 K/UL
LYMPHOCYTES NFR BLD: 5.8 %
MAGNESIUM SERPL-MCNC: 2.2 MG/DL
MCH RBC QN AUTO: 37.9 PG
MCHC RBC AUTO-ENTMCNC: 34.9 G/DL
MCV RBC AUTO: 109 FL
MONOCYTES # BLD AUTO: 1.9 K/UL
MONOCYTES NFR BLD: 6.7 %
NEUTROPHILS # BLD AUTO: 24.3 K/UL
NEUTROPHILS NFR BLD: 87 %
PHOSPHATE SERPL-MCNC: 3 MG/DL
PLATELET # BLD AUTO: 126 K/UL
PMV BLD AUTO: 11.6 FL
POTASSIUM SERPL-SCNC: 4.4 MMOL/L
PROT SERPL-MCNC: 5.7 G/DL
PROTHROMBIN TIME: 14.7 SEC
RBC # BLD AUTO: 2.61 M/UL
SODIUM SERPL-SCNC: 131 MMOL/L
SODIUM UR-SCNC: <20 MMOL/L
WBC # BLD AUTO: 28.25 K/UL

## 2017-10-07 PROCEDURE — 85610 PROTHROMBIN TIME: CPT

## 2017-10-07 PROCEDURE — 63600175 PHARM REV CODE 636 W HCPCS: Performed by: STUDENT IN AN ORGANIZED HEALTH CARE EDUCATION/TRAINING PROGRAM

## 2017-10-07 PROCEDURE — 20600001 HC STEP DOWN PRIVATE ROOM

## 2017-10-07 PROCEDURE — 80076 HEPATIC FUNCTION PANEL: CPT

## 2017-10-07 PROCEDURE — 80048 BASIC METABOLIC PNL TOTAL CA: CPT

## 2017-10-07 PROCEDURE — 83735 ASSAY OF MAGNESIUM: CPT

## 2017-10-07 PROCEDURE — 36415 COLL VENOUS BLD VENIPUNCTURE: CPT

## 2017-10-07 PROCEDURE — 99233 SBSQ HOSP IP/OBS HIGH 50: CPT | Mod: ,,, | Performed by: INTERNAL MEDICINE

## 2017-10-07 PROCEDURE — 63600175 PHARM REV CODE 636 W HCPCS

## 2017-10-07 PROCEDURE — 25000003 PHARM REV CODE 250: Performed by: CLINICAL NURSE SPECIALIST

## 2017-10-07 PROCEDURE — 84100 ASSAY OF PHOSPHORUS: CPT

## 2017-10-07 PROCEDURE — 25000003 PHARM REV CODE 250: Performed by: INTERNAL MEDICINE

## 2017-10-07 PROCEDURE — 99233 SBSQ HOSP IP/OBS HIGH 50: CPT | Mod: ,,, | Performed by: HOSPITALIST

## 2017-10-07 PROCEDURE — 25000003 PHARM REV CODE 250: Performed by: NURSE PRACTITIONER

## 2017-10-07 PROCEDURE — 85025 COMPLETE CBC W/AUTO DIFF WBC: CPT

## 2017-10-07 PROCEDURE — 63600175 PHARM REV CODE 636 W HCPCS: Performed by: NURSE PRACTITIONER

## 2017-10-07 PROCEDURE — 99232 SBSQ HOSP IP/OBS MODERATE 35: CPT | Mod: ,,, | Performed by: INTERNAL MEDICINE

## 2017-10-07 PROCEDURE — 84300 ASSAY OF URINE SODIUM: CPT

## 2017-10-07 PROCEDURE — P9047 ALBUMIN (HUMAN), 25%, 50ML: HCPCS | Performed by: STUDENT IN AN ORGANIZED HEALTH CARE EDUCATION/TRAINING PROGRAM

## 2017-10-07 RX ORDER — ALBUMIN HUMAN 250 G/1000ML
25 SOLUTION INTRAVENOUS 3 TIMES DAILY
Status: COMPLETED | OUTPATIENT
Start: 2017-10-07 | End: 2017-10-07

## 2017-10-07 RX ADMIN — HEPARIN SODIUM 5000 UNITS: 5000 INJECTION, SOLUTION INTRAVENOUS; SUBCUTANEOUS at 08:10

## 2017-10-07 RX ADMIN — RIFAXIMIN 550 MG: 550 TABLET ORAL at 08:10

## 2017-10-07 RX ADMIN — FAMOTIDINE 20 MG: 20 TABLET, FILM COATED ORAL at 08:10

## 2017-10-07 RX ADMIN — ONDANSETRON 4 MG: 2 INJECTION INTRAMUSCULAR; INTRAVENOUS at 05:10

## 2017-10-07 RX ADMIN — HEPARIN SODIUM 5000 UNITS: 5000 INJECTION, SOLUTION INTRAVENOUS; SUBCUTANEOUS at 05:10

## 2017-10-07 RX ADMIN — SODIUM BICARBONATE 650 MG TABLET 650 MG: at 08:10

## 2017-10-07 RX ADMIN — ALBUMIN (HUMAN) 25 G: 12.5 SOLUTION INTRAVENOUS at 11:10

## 2017-10-07 RX ADMIN — HEPARIN SODIUM 5000 UNITS: 5000 INJECTION, SOLUTION INTRAVENOUS; SUBCUTANEOUS at 02:10

## 2017-10-07 RX ADMIN — ALBUMIN (HUMAN) 25 G: 12.5 SOLUTION INTRAVENOUS at 08:10

## 2017-10-07 RX ADMIN — LACTULOSE 10 G: 20 SOLUTION ORAL at 08:10

## 2017-10-07 NOTE — ASSESSMENT & PLAN NOTE
27 year old male with a history of alcohol abuse transferred from OSH for higher level of care. On admission his issues were alcoholic hepatitis, possible acute pancreatitis (4/5 BiSAP score, high mortality) MELD-Na score 34, acute renal injury, malnutrition, and debility.    Now in TSU with a mild increase in MELD secondary to decrease in sodium.    Recommendations:  - Continue liquid diet as well as tube feeds in the setting of AH goal for protein and calories is the following calorie 30-40kcal/day and protein 1.5 gram/kg/day; if able to tolerate it would not be unreasonable to advance liquid diet  - Continue Lactulose (can be titrated to ~ 3-5 BM per day) and Rifaximin  - Albumin 25% 25 g X 2 doses  - Obtain therapeutic paracentesis first thing Monday morning, if possible during the weekend that would be even better  - Addiction psych to visit with him again ?next week  - Continue daily PT/OT

## 2017-10-07 NOTE — ASSESSMENT & PLAN NOTE
· Mentation improving  · Continue Lactulose and Rifaxmin  · SLP consulted as mentation improved.  Would like to DC NGT and TF if able to tolerate oral feeds

## 2017-10-07 NOTE — ASSESSMENT & PLAN NOTE
· WBC remains elevated - decreased to 28 toady  · Remains afebrile  · Repeat blood cultures NGTD  · UA negative  · CXR with broad ddx  · Resp cx sent but unlikely to grow  · ID following, appreciate assistance. Want to monitor off of abx
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
## 2017-10-07 NOTE — ASSESSMENT & PLAN NOTE
28yo man w/a history of childhood asthma and alcohol abuse (fifth whiskey daily >10yrs, active through admission) who was admitted on 9/27/2017 as a transfer from an OSH (first admitted 9/6) with 6-8wk progressively worsening fatigue, jaundice, abdominal pain, distension, BLE edema, and 20lb weight gain due to decompensated liver failure and recently diagnosed alcoholic cirrhosis (c/b HE, EV, portal HTN, scant ascites, no SBP, HRS). ID has been consulted to comment on a persistent leukocytosis and transplant candidacy.     Extensive infectious work-up with no localizing source - besides stigmata of liver failure, patient with no localizing signs or symptoms, blood / urine cultures negative, ascites fluid not consistent with SBP. CT A/P with groundglass opacities in lung, but patient already completed 7-days of pip-tazo for pneumonia coverage. Suspect leukocytosis in setting of alcoholic hepatitis superimposed on cirrhosis.    - Monitor off antibiotics  - Transplant evaluation: serological work-up pending (particularly strongyloides and quant gold)  - Awaiting psych evaluation  - Vaccines needed include HAV, HBV, prevnar, and influenza  - Please have patient follow-up in ID clinic for final assessment of transplant candidacy

## 2017-10-07 NOTE — SUBJECTIVE & OBJECTIVE
Interval History: No events overnight.  Had 2 BMs.  Mentation improved today - oriented x 3.  Wants ice cream.  SLP consulted for eval as mentation improved.    Review of Systems   Constitutional: Negative for chills, fatigue and fever.   HENT: Negative for sore throat and trouble swallowing.    Eyes: Negative for photophobia and visual disturbance.   Respiratory: Negative for cough and shortness of breath.    Cardiovascular: Negative for chest pain, palpitations and leg swelling.   Gastrointestinal: Positive for abdominal distention. Negative for abdominal pain, constipation, diarrhea, nausea and vomiting.   Endocrine: Negative for cold intolerance and heat intolerance.   Genitourinary: Negative for dysuria and frequency.   Musculoskeletal: Negative for arthralgias and myalgias.   Skin: Positive for color change. Negative for rash and wound.   Neurological: Negative for dizziness, syncope, weakness and light-headedness.   Psychiatric/Behavioral: Positive for confusion. Negative for hallucinations.   All other systems reviewed and are negative.    Objective:     Vital Signs (Most Recent):  Temp: 98.9 °F (37.2 °C) (10/07/17 1113)  Pulse: 104 (10/07/17 1127)  Resp: 18 (10/07/17 1113)  BP: 128/67 (10/07/17 1113)  SpO2: 98 % (10/07/17 1113) Vital Signs (24h Range):  Temp:  [98.1 °F (36.7 °C)-98.9 °F (37.2 °C)] 98.9 °F (37.2 °C)  Pulse:  [] 104  Resp:  [18-22] 18  SpO2:  [95 %-100 %] 98 %  BP: (118-130)/(54-68) 128/67     Weight: 83.6 kg (184 lb 4.9 oz)  Body mass index is 28.87 kg/m².    Intake/Output Summary (Last 24 hours) at 10/07/17 1132  Last data filed at 10/07/17 0900   Gross per 24 hour   Intake             1715 ml   Output              575 ml   Net             1140 ml      Physical Exam   Constitutional: He appears well-developed and well-nourished. He is cooperative.  Non-toxic appearance. No distress.   HENT:   Head: Normocephalic and atraumatic.   Mouth/Throat: Oropharynx is clear and moist.   Eyes:  EOM are normal. Pupils are equal, round, and reactive to light. Scleral icterus is present.   Neck: Normal range of motion. Neck supple.   Cardiovascular: Regular rhythm, normal heart sounds and normal pulses.  Tachycardia present.    No murmur heard.  Sinus tachycardia   Pulmonary/Chest: Effort normal. No respiratory distress. He has decreased breath sounds in the right lower field and the left lower field. He has no wheezes. He has no rales.   Abdominal: Soft. Normal appearance. He exhibits distension. He exhibits no mass. Bowel sounds are decreased. There is no tenderness. There is no rebound and no guarding. No hernia.   Musculoskeletal: Normal range of motion. He exhibits no edema.   Lymphadenopathy:     He has no cervical adenopathy.   Neurological: He is alert. He is not disoriented.   Patient follows commands but needs restraints to prevent removal of NGT  Oriented to person, place and time today   Skin: Skin is warm and dry. He is not diaphoretic.   Old blister/ wart on right plantar surface of the foot near the big toe   Psychiatric: He has a normal mood and affect. His behavior is normal. His affect is not labile. His speech is delayed. Cognition and memory are not impaired.   Nursing note and vitals reviewed.      Significant Labs:   CBC:   Recent Labs  Lab 10/06/17  0434 10/07/17  0403   WBC 31.89* 28.25*   HGB 10.1* 9.9*   HCT 28.7* 28.4*   * 126*     CMP:   Recent Labs  Lab 10/06/17  0434 10/07/17  0403    131*   K 4.4 4.4   * 107   CO2 12* 13*   * 104   BUN 35* 42*   CREATININE 1.3 1.4   CALCIUM 8.1* 8.1*   PROT 5.7* 5.7*   ALBUMIN 2.1* 2.0*   BILITOT 29.8* 30.6*   ALKPHOS 155* 170*   * 113*   ALT 65* 65*   ANIONGAP 13 11   EGFRNONAA >60.0 >60.0     Coagulation:   Recent Labs  Lab 10/07/17  0403   INR 1.4*     All pertinent labs within the past 24 hours have been reviewed.    Significant Imaging: CXR: I have reviewed all pertinent results/findings within the past 24  hours and my personal findings are:  NG in appropriate position

## 2017-10-07 NOTE — ASSESSMENT & PLAN NOTE
· Pt had an episode of hematemesis at OSH and EGD that revealed 1 small varix, reflux esophagitis, and gastropathy of portal hypertension  · No evidence of bleeding currently and stable hemgolobin  · Continue Famotidine BID as it is a suspension

## 2017-10-07 NOTE — SUBJECTIVE & OBJECTIVE
Interval History:   No fevers overnight  Complaining of abdominal distension  Tube feeds in place    Review of Systems   Constitutional: Negative for chills, diaphoresis, fever and unexpected weight change.   HENT: Negative for congestion, mouth sores, rhinorrhea, sinus pain, sore throat and trouble swallowing.    Respiratory: Negative for cough, shortness of breath and stridor.    Cardiovascular: Negative for chest pain and leg swelling.   Gastrointestinal: Negative for abdominal pain, blood in stool, diarrhea, nausea and vomiting.   Genitourinary: Negative for dysuria, flank pain and genital sores.   Musculoskeletal: Negative for arthralgias, back pain, joint swelling, myalgias and neck stiffness.   Skin: Negative for rash and wound.   Neurological: Negative for light-headedness and headaches.   Hematological: Negative for adenopathy.     Objective:     Vital Signs (Most Recent):  Temp: 98.8 °F (37.1 °C) (10/07/17 0747)  Pulse: 101 (10/07/17 0747)  Resp: 20 (10/07/17 0747)  BP: 130/68 (10/07/17 0747)  SpO2: 97 % (10/07/17 0747) Vital Signs (24h Range):  Temp:  [98.1 °F (36.7 °C)-98.8 °F (37.1 °C)] 98.8 °F (37.1 °C)  Pulse:  [] 101  Resp:  [20-22] 20  SpO2:  [95 %-100 %] 97 %  BP: (118-130)/(54-68) 130/68     Weight: 83.6 kg (184 lb 4.9 oz)  Body mass index is 28.87 kg/m².    Estimated Creatinine Clearance: 81.2 mL/min (based on SCr of 1.4 mg/dL).    Physical Exam   Constitutional: No distress.   Eyes: Scleral icterus is present.   Neck: Normal range of motion. Neck supple.   Cardiovascular: Normal rate, regular rhythm and normal heart sounds.    No murmur heard.  Pulmonary/Chest: Effort normal and breath sounds normal. No respiratory distress. He has no wheezes. He has no rales.   Abdominal: Soft. Bowel sounds are normal. He exhibits distension. There is no tenderness. There is no guarding.   Lymphadenopathy:     He has no cervical adenopathy.   Neurological: He is alert.   Skin: Skin is warm and dry. He is  not diaphoretic.   Vitals reviewed.      Significant Labs: All pertinent labs within the past 24 hours have been reviewed.    Significant Imaging: None

## 2017-10-07 NOTE — SUBJECTIVE & OBJECTIVE
Interval History:   - Patient complaining of abdominal distension    Review of Systems   Constitutional: Negative for chills, diaphoresis, fever and unexpected weight change.   HENT: Negative for congestion, mouth sores, rhinorrhea, sinus pain, sore throat and trouble swallowing.    Respiratory: Negative for cough, shortness of breath and stridor.    Cardiovascular: Negative for chest pain and leg swelling.   Gastrointestinal: Negative for abdominal pain, blood in stool, diarrhea, nausea and vomiting.   Genitourinary: Negative for dysuria, flank pain and genital sores.   Musculoskeletal: Negative for arthralgias, back pain, joint swelling, myalgias and neck stiffness.   Skin: Negative for rash and wound.   Neurological: Negative for light-headedness and headaches.   Hematological: Negative for adenopathy.     Objective:     Vital Signs (Most Recent):  Temp: 98.3 °F (36.8 °C) (10/06/17 1625)  Pulse: 106 (10/06/17 1900)  Resp: 20 (10/06/17 1625)  BP: 119/64 (10/06/17 1625)  SpO2: 99 % (10/06/17 1625) Vital Signs (24h Range):  Temp:  [98.1 °F (36.7 °C)-99 °F (37.2 °C)] 98.3 °F (36.8 °C)  Pulse:  [] 106  Resp:  [20-34] 20  SpO2:  [96 %-100 %] 99 %  BP: (115-132)/(53-69) 119/64     Weight: 83.6 kg (184 lb 4.9 oz)  Body mass index is 28.87 kg/m².    Estimated Creatinine Clearance: 87.5 mL/min (based on SCr of 1.3 mg/dL).    Physical Exam   Constitutional: He is oriented to person, place, and time. No distress.   HENT:   Mouth/Throat: Oropharynx is clear and moist. No oropharyngeal exudate.   Eyes: Scleral icterus is present.   Neck: Normal range of motion. Neck supple.   Cardiovascular: Normal rate and regular rhythm.  Exam reveals no friction rub.    No murmur heard.  Pulmonary/Chest: Effort normal and breath sounds normal. No respiratory distress. He has no wheezes. He has no rales.   Abdominal: Soft. Bowel sounds are normal. He exhibits distension. There is tenderness. There is no rebound and no guarding.   NG  tube in place   Musculoskeletal: Normal range of motion. He exhibits edema.   In wrist restraints   Lymphadenopathy:     He has no cervical adenopathy.   Neurological: He is alert and oriented to person, place, and time.   Skin: Skin is warm and dry. No rash noted. He is not diaphoretic. No erythema.   Jaundiced   Psychiatric:   Slow speech, but answers questions appropriately   Vitals reviewed.      Significant Labs: All pertinent labs within the past 24 hours have been reviewed.     10/5/2017  Ascites fluid  N36%      Significant Imaging: I have reviewed all pertinent imaging results/findings within the past 24 hours.

## 2017-10-07 NOTE — PROGRESS NOTES
[facility]  Infectious Disease  Progress Note    Patient Name: Jhonny Diana  MRN: [id number]  Admission Date: 9/27/2017  Length of Stay: 10 days  Attending Physician: Mi Braun MD  Primary Care Provider: Provider [facility]    Isolation Status: No active isolations  Assessment/Plan:      * Alcoholic hepatitis with ascites    28yo man w/a history of childhood asthma and alcohol abuse (fifth whiskey daily >10yrs, active through admission) who was admitted on 9/27/2017 as a transfer from an OSH (first admitted 9/6) with 6-8wk progressively worsening fatigue, jaundice, abdominal pain, distension, BLE edema, and 20lb weight gain due to decompensated liver failure and recently diagnosed alcoholic cirrhosis (c/b HE, EV, portal HTN, scant ascites, no SBP, HRS). ID has been consulted to comment on a persistent leukocytosis and transplant candidacy.     Extensive infectious work-up with no localizing source - besides stigmata of liver failure, patient with no localizing signs or symptoms, blood / urine cultures negative, ascites fluid not consistent with SBP. CT A/P with groundglass opacities in lung, but patient already completed 7-days of pip-tazo for pneumonia coverage. Suspect leukocytosis in setting of alcoholic hepatitis superimposed on cirrhosis.    - Monitor off antibiotics  - Transplant evaluation: serological work-up pending (particularly strongyloides and quant gold)  - Awaiting psych evaluation  - Vaccines needed include HAV, HBV, prevnar, and influenza  - Please have patient follow-up in ID clinic for final assessment of transplant candidacy               Anticipated Disposition:     Thank you for your consult. I will sign off. Please contact us if you have any additional questions.    Marcela Longoria MD  Infectious Disease  [facility]    Subjective:     Principal Problem:Alcoholic hepatitis with ascites    HPI: No notes on file  Interval History:   No fevers  overnight  Complaining of abdominal distension  Tube feeds in place    Review of Systems   Constitutional: Negative for chills, diaphoresis, fever and unexpected weight change.   HENT: Negative for congestion, mouth sores, rhinorrhea, sinus pain, sore throat and trouble swallowing.    Respiratory: Negative for cough, shortness of breath and stridor.    Cardiovascular: Negative for chest pain and leg swelling.   Gastrointestinal: Negative for abdominal pain, blood in stool, diarrhea, nausea and vomiting.   Genitourinary: Negative for dysuria, flank pain and genital sores.   Musculoskeletal: Negative for arthralgias, back pain, joint swelling, myalgias and neck stiffness.   Skin: Negative for rash and wound.   Neurological: Negative for light-headedness and headaches.   Hematological: Negative for adenopathy.     Objective:     Vital Signs (Most Recent):  Temp: 98.8 °F (37.1 °C) (10/07/17 0747)  Pulse: 101 (10/07/17 0747)  Resp: 20 (10/07/17 0747)  BP: 130/68 (10/07/17 0747)  SpO2: 97 % (10/07/17 0747) Vital Signs (24h Range):  Temp:  [98.1 °F (36.7 °C)-98.8 °F (37.1 °C)] 98.8 °F (37.1 °C)  Pulse:  [] 101  Resp:  [20-22] 20  SpO2:  [95 %-100 %] 97 %  BP: (118-130)/(54-68) 130/68     Weight: 83.6 kg (184 lb 4.9 oz)  Body mass index is 28.87 kg/m².    Estimated Creatinine Clearance: 81.2 mL/min (based on SCr of 1.4 mg/dL).    Physical Exam   Constitutional: No distress.   Eyes: Scleral icterus is present.   Neck: Normal range of motion. Neck supple.   Cardiovascular: Normal rate, regular rhythm and normal heart sounds.    No murmur heard.  Pulmonary/Chest: Effort normal and breath sounds normal. No respiratory distress. He has no wheezes. He has no rales.   Abdominal: Soft. Bowel sounds are normal. He exhibits distension. There is no tenderness. There is no guarding.   Lymphadenopathy:     He has no cervical adenopathy.   Neurological: He is alert.   Skin: Skin is warm and dry. He is not diaphoretic.   Vitals  reviewed.      Significant Labs: All pertinent labs within the past 24 hours have been reviewed.    Significant Imaging: None

## 2017-10-07 NOTE — PROGRESS NOTES
Paged IMS for non-violent restraint renewal. No call back. Paged Hepatology. Will continue to monitor.

## 2017-10-07 NOTE — SUBJECTIVE & OBJECTIVE
Interval History:   Overnight patient had very vivid dreams not hallucinations while awake.   Today he complains that he feels very tired and wants to rest.    Current Facility-Administered Medications   Medication    albumin human 25% bottle 25 g    famotidine tablet 20 mg    heparin (porcine) injection 5,000 Units    lactulose 20 gram/30 mL solution Soln 10 g    ondansetron injection 4 mg    pneumoc 13-arianne conj-dip cr(PF) 0.5 mL    potassium chloride 10% solution 40 mEq    potassium chloride 10% solution 40 mEq    potassium chloride 10% solution 60 mEq    potassium, sodium phosphates 280-160-250 mg packet 2 packet    potassium, sodium phosphates 280-160-250 mg packet 2 packet    potassium, sodium phosphates 280-160-250 mg packet 2 packet    rifAXIMin tablet 550 mg    sodium bicarbonate tablet 650 mg       Objective:     Vital Signs (Most Recent):  Temp: 98.9 °F (37.2 °C) (10/07/17 1113)  Pulse: 104 (10/07/17 1127)  Resp: 18 (10/07/17 1113)  BP: 128/67 (10/07/17 1113)  SpO2: 98 % (10/07/17 1113) Vital Signs (24h Range):  Temp:  [98.1 °F (36.7 °C)-98.9 °F (37.2 °C)] 98.9 °F (37.2 °C)  Pulse:  [] 104  Resp:  [18-22] 18  SpO2:  [95 %-100 %] 98 %  BP: (118-130)/(54-68) 128/67     Weight: 83.6 kg (184 lb 4.9 oz) (10/06/17 0400)  Body mass index is 28.87 kg/m².    Physical Exam   Constitutional: No distress.   HENT:   Head: Normocephalic.   Eyes: Pupils are equal, round, and reactive to light. Scleral icterus is present.   Neck: Normal range of motion.   Cardiovascular:   Sinus tachycardia     Pulmonary/Chest: Effort normal and breath sounds normal. No respiratory distress. He has no wheezes. He has no rales. He exhibits no tenderness.   Abdominal: He exhibits distension. He exhibits no mass. There is no tenderness. There is no rebound and no guarding. No hernia.   Musculoskeletal: He exhibits no edema.   Neurological:   Patient alert but not completely oriented, Grade 1/2 encephalopathy   Skin: He is  not diaphoretic.       MELD-Na score: 29 at 10/7/2017  4:03 AM  MELD score: 26 at 10/7/2017  4:03 AM  Calculated from:  Serum Creatinine: 1.4 mg/dL at 10/7/2017  4:03 AM  Serum Sodium: 131 mmol/L at 10/7/2017  4:03 AM  Total Bilirubin: 30.6 mg/dL at 10/7/2017  4:03 AM  INR(ratio): 1.4 at 10/7/2017  4:03 AM  Age: 27 years    Significant Labs:  CBC:   Recent Labs  Lab 10/07/17  0403   WBC 28.25*   RBC 2.61*   HGB 9.9*   HCT 28.4*   *     CMP:   Recent Labs  Lab 10/07/17  0403      CALCIUM 8.1*   ALBUMIN 2.0*   PROT 5.7*   *   K 4.4   CO2 13*      BUN 42*   CREATININE 1.4   ALKPHOS 170*   ALT 65*   *   BILITOT 30.6*     Coagulation:   Recent Labs  Lab 10/07/17  0403   INR 1.4*       Significant Imaging:  No recent imaging to review

## 2017-10-07 NOTE — PROGRESS NOTES
"[facility] Hospital Medicine  Progress Note    Patient Name: Jhonny Diana  MRN: [id number]  Patient Class: IP- Inpatient   Admission Date: 9/27/2017  Length of Stay: 10 days  Attending Physician: Mi Braun MD  Primary Care Provider: Provider [place] [facility] Medicine Team: [facility] HOSP MED S Mi Braun MD    Subjective:     Principal Problem:Alcoholic hepatitis with ascites    HPI:  28 yo male with hx of childhood asthma and heavy alcohol abuse (fifth of liquor daily since teenager, last drink 2.5 wks ago per mother) who was transferred to [facility] for higher level of care/liver transplant evaluation. Patient initially presented to OSH for a 2 mo hx of progressively worsening abdominal pain with associated fatigue, jaundice, and abdominal distention with ~20# weight gain. HPI given by patient's mother and OSH records as patient's participation in interview limited by previous administration of pain medication at OSH.      Per mother, patient did not have a fever, chills, dark stools, known sick contacts, nausea, vomiting, diarrhea, confusion, or HA. Reports family hx of alcoholic cirrhosis in father. Pt was recently diagnosed with alcoholic hepatitis during his admission at OSH on 9/6-9/15. During that hospitalization, patient was given ceftriaxone for SBP ppx, prednisone for alcoholic hepatitis, and diuresed. Pt was discharged and instructed to follow up with PCP and AA within 1 wk. After his discharge, patient returned to ED 4 days after for increased weight gain and worsening abdominal pain/jaundice. At OSH, patient was given fluids (for suspected pancreatitis), diuresed and given IV albumin. Two days prior to transfer to [facility], patient had an episode of hematemesis and underwent an EGD on 9/25 that revealed "1 sm varix in distal esophagus, no stigmata of bleeding, gastropathy of portal hypertension, and reflux esophagitis".  No further episodes of hematemesis noted since initial " episode.      Recent labs at OSH: Na 134, CO2 18, BUN 36, Cr 2, Tprot 5.9, albumin 1.9, Tbili 24, alk phos 83, , ALT51. Patient also had lipase 532 and ammonia 118 during his previous hospitalization at OSH. Labwork also notable for negative mitochondrial ab, sm muscle ab, and ceruloplasmin 22. Imaging at OSH revealing abdo U/S (9/20): hepatosplenomegaly, CT abdo w contrast: hepatomegaly with marked fatty infiltration of the liver and mild ascites (9/6). No CDs available for review.    Hospital Course:  Mr. Diana was admitted to the CMICU on 9/27 for decompensated alcoholic hepatitis with high MELD score, severe HE, and LAURA. Hepatology consulted and following to evaluate liver transplant candidacy. Patient not currently a transplant candidate due to recent alcohol use; recommending re-evaluation by addiction psych and social work when more medically stable and able to participate. Pt started on albumin and lasix for LAURA and possibility for HRS. However nephrology felt that LAURA is likely multifactorial: ATN given hypotensive episodes at OSH and toxic given severe hyperbilirubinemia; not truly HRS. Lactulose/rifaximin started for HE and mental status improving slowly. Mr. Diana has had significant ABD pain since admission with ABD distension. CT abdomen was significant for pericolonic fat stranding of right colon; no obstruction. Also with diffuse mesenteric edema. No significant ascites noted, no cholecystitis/cholelithiasis/ductal dilation. US liver with doppler showed appropriate vasculature.  He was stepped down to Hospital Medicine on 10/5.    Interval History: No events overnight.  Had 2 BMs.  Mentation improved today - oriented x 3.  Wants ice cream.  SLP consulted for eval as mentation improved.    Review of Systems   Constitutional: Negative for chills, fatigue and fever.   HENT: Negative for sore throat and trouble swallowing.    Eyes: Negative for photophobia and visual disturbance.   Respiratory:  Negative for cough and shortness of breath.    Cardiovascular: Negative for chest pain, palpitations and leg swelling.   Gastrointestinal: Positive for abdominal distention. Negative for abdominal pain, constipation, diarrhea, nausea and vomiting.   Endocrine: Negative for cold intolerance and heat intolerance.   Genitourinary: Negative for dysuria and frequency.   Musculoskeletal: Negative for arthralgias and myalgias.   Skin: Positive for color change. Negative for rash and wound.   Neurological: Negative for dizziness, syncope, weakness and light-headedness.   Psychiatric/Behavioral: Positive for confusion. Negative for hallucinations.   All other systems reviewed and are negative.    Objective:     Vital Signs (Most Recent):  Temp: 98.9 °F (37.2 °C) (10/07/17 1113)  Pulse: 104 (10/07/17 1127)  Resp: 18 (10/07/17 1113)  BP: 128/67 (10/07/17 1113)  SpO2: 98 % (10/07/17 1113) Vital Signs (24h Range):  Temp:  [98.1 °F (36.7 °C)-98.9 °F (37.2 °C)] 98.9 °F (37.2 °C)  Pulse:  [] 104  Resp:  [18-22] 18  SpO2:  [95 %-100 %] 98 %  BP: (118-130)/(54-68) 128/67     Weight: 83.6 kg (184 lb 4.9 oz)  Body mass index is 28.87 kg/m².    Intake/Output Summary (Last 24 hours) at 10/07/17 1132  Last data filed at 10/07/17 0900   Gross per 24 hour   Intake             1715 ml   Output              575 ml   Net             1140 ml      Physical Exam   Constitutional: He appears well-developed and well-nourished. He is cooperative.  Non-toxic appearance. No distress.   HENT:   Head: Normocephalic and atraumatic.   Mouth/Throat: Oropharynx is clear and moist.   Eyes: EOM are normal. Pupils are equal, round, and reactive to light. Scleral icterus is present.   Neck: Normal range of motion. Neck supple.   Cardiovascular: Regular rhythm, normal heart sounds and normal pulses.  Tachycardia present.    No murmur heard.  Sinus tachycardia   Pulmonary/Chest: Effort normal. No respiratory distress. He has decreased breath sounds in the  right lower field and the left lower field. He has no wheezes. He has no rales.   Abdominal: Soft. Normal appearance. He exhibits distension. He exhibits no mass. Bowel sounds are decreased. There is no tenderness. There is no rebound and no guarding. No hernia.   Musculoskeletal: Normal range of motion. He exhibits no edema.   Lymphadenopathy:     He has no cervical adenopathy.   Neurological: He is alert. He is not disoriented.   Patient follows commands but needs restraints to prevent removal of NGT  Oriented to person, place and time today   Skin: Skin is warm and dry. He is not diaphoretic.   Old blister/ wart on right plantar surface of the foot near the big toe   Psychiatric: He has a normal mood and affect. His behavior is normal. His affect is not labile. His speech is delayed. Cognition and memory are not impaired.   Nursing note and vitals reviewed.      Significant Labs:   CBC:   Recent Labs  Lab 10/06/17  0434 10/07/17  0403   WBC 31.89* 28.25*   HGB 10.1* 9.9*   HCT 28.7* 28.4*   * 126*     CMP:   Recent Labs  Lab 10/06/17  0434 10/07/17  0403    131*   K 4.4 4.4   * 107   CO2 12* 13*   * 104   BUN 35* 42*   CREATININE 1.3 1.4   CALCIUM 8.1* 8.1*   PROT 5.7* 5.7*   ALBUMIN 2.1* 2.0*   BILITOT 29.8* 30.6*   ALKPHOS 155* 170*   * 113*   ALT 65* 65*   ANIONGAP 13 11   EGFRNONAA >60.0 >60.0     Coagulation:   Recent Labs  Lab 10/07/17  0403   INR 1.4*     All pertinent labs within the past 24 hours have been reviewed.    Significant Imaging: CXR: I have reviewed all pertinent results/findings within the past 24 hours and my personal findings are:  NG in appropriate position    Assessment/Plan:      * Alcoholic hepatitis with ascites    · Appreciate Hepatology assistance  · Continue Lactulose and Rifaximin for HE  · Currently not transplant candidate per Psych and Hep  · IR para ordered for therapeutic tap    MELD-Na score: 29 at 10/7/2017  4:03 AM  MELD score: 26 at  10/7/2017  4:03 AM  Calculated from:  Serum Creatinine: 1.4 mg/dL at 10/7/2017  4:03 AM  Serum Sodium: 131 mmol/L at 10/7/2017  4:03 AM  Total Bilirubin: 30.6 mg/dL at 10/7/2017  4:03 AM  INR(ratio): 1.4 at 10/7/2017  4:03 AM  Age: 27 years            Coagulopathy    · 2/2 liver disease  · No bleeding; Monitor          Decompensated hepatic cirrhosis    See alcoholic hepatitis with ascites          History of hematemesis    · Pt had an episode of hematemesis at OSH and EGD that revealed 1 small varix, reflux esophagitis, and gastropathy of portal hypertension  · No evidence of bleeding currently and stable hemgolobin  · Continue Famotidine BID as it is a suspension        Hepatic encephalopathy    · Mentation improving  · Continue Lactulose and Rifaxmin  · SLP consulted as mentation improved.  Would like to DC NGT and TF if able to tolerate oral feeds          Alcoholism /alcohol abuse    · Reported about a fifth of whiskey daily since teenager  · Appreciate Psych and Hep assistance; currently high risk for liver transplant  · PETH 174 on admit, 9/27        Metabolic acidosis with normal anion gap and bicarbonate losses    · 2/2 diarrhea given lactulose.  Currently on 10g daily with improving BMs  · Still acidotic, will monitor  · Sodium Bicarb BID          Acute renal failure with tubular necrosis    · Appreciate Nephro's help  · Suspect LAURA multifactorial: ischemic ATN given hypotension and injury due to hyperbilirubinemia. Felt less likely to be HRS however have continued albumin with lasix  · Overall, LAURA improving and making adequate urine.  Not measuring amounts as he pulls off condom catheter and is in diapers - but mother reports frequent wet diapers          Leukocytosis    · WBC remains elevated - decreased to 28 toady  · Remains afebrile  · Repeat blood cultures NGTD  · UA negative  · CXR with broad ddx  · Resp cx sent but unlikely to grow  · ID following, appreciate assistance. Want to monitor off of  abx          Severe malnutrition    · SLP consulted  · Continue NGT and TF          Anasarca    · 2/2 liver disease and hypoalbuminemia            VTE Risk Mitigation         Ordered     heparin (porcine) injection 5,000 Units  Every 8 hours     Route:  Subcutaneous        10/03/17 1740     Place sequential compression device  Until discontinued      09/28/17 0238     Medium Risk of VTE  Once      09/27/17 2337        Dispo pending improvement in mentation, liver function and transplant recs      Mi Braun MD  Department of Hospital Medicine   [facility]

## 2017-10-07 NOTE — ASSESSMENT & PLAN NOTE
· Reported about a fifth of whiskey daily since teenager  · Appreciate Psych and Hep assistance; currently high risk for liver transplant  · PETH 174 on admit, 9/27

## 2017-10-07 NOTE — ASSESSMENT & PLAN NOTE
Worsening hyponatremia in the setting of cirrhosis, patient does have ascites but could possibly be intravascular depleted. Therefore will obtain urine sodium and try a trial of albumin.    Recommendations:  - Albumin 25% 25 g X 2 doses  - Obtain urine sodium

## 2017-10-07 NOTE — PLAN OF CARE
Problem: Patient Care Overview  Goal: Plan of Care Review  Outcome: Ongoing (interventions implemented as appropriate)  Pt remained free from injury during shift. Mentation much improved from yesterday. Disoriented to situation. Intermittent confusion at times but with redirection appropriate. Non-violent restraints renewed until 10/8 0822. Pt continues to reach for NG tube while sleeping. TF increased to 55ml/hr. Residual range 100-200cc. Pt denies nausea. Albumin x2 ordered during shift. Pt scheduled for paracentesis most likely occurring Mon am. Urine sodium collected during shift. Urine output remains tea colored. 3 BMs as of yet. Pt moving to bedside commode with 2 person assistance. Speech consulted for diet advancement. Pt requesting to eat reports appetite coming back. Incentive spirometer at bedside with pt using as directed. Mother remains at bedside attentive to pt needs. Call light in reach. Will continue to monitor.

## 2017-10-07 NOTE — ASSESSMENT & PLAN NOTE
· Appreciate Hepatology assistance  · Continue Lactulose and Rifaximin for HE  · Currently not transplant candidate per Psych and Hep  · IR para ordered for therapeutic tap    MELD-Na score: 29 at 10/7/2017  4:03 AM  MELD score: 26 at 10/7/2017  4:03 AM  Calculated from:  Serum Creatinine: 1.4 mg/dL at 10/7/2017  4:03 AM  Serum Sodium: 131 mmol/L at 10/7/2017  4:03 AM  Total Bilirubin: 30.6 mg/dL at 10/7/2017  4:03 AM  INR(ratio): 1.4 at 10/7/2017  4:03 AM  Age: 27 years

## 2017-10-07 NOTE — PROGRESS NOTES
[facility]  Infectious Disease  Progress Note    Patient Name: Jhonny Diana  MRN: [id number]  Admission Date: 9/27/2017  Length of Stay: 9 days  Attending Physician: Mi Braun MD  Primary Care Provider: Provider Notinsystem    Isolation Status: No active isolations  Assessment/Plan:      * Alcoholic hepatitis with ascites    26yo man w/a history of childhood asthma and alcohol abuse (fifth whiskey daily >10yrs, active through admission) who was admitted on 9/27/2017 as a transfer from an OSH (first admitted 9/6) with 6-8wk progressively worsening fatigue, jaundice, abdominal pain, distension, BLE edema, and 20lb weight gain due to decompensated liver failure and recently diagnosed alcoholic cirrhosis (c/b HE, EV, portal HTN, scant ascites, no SBP, HRS). ID has been consulted to comment on a persistent leukocytosis and transplant candidacy. He remains critically ill at this time.    Extensive infectious work-up with no localizing source - besides stigmata of liver failure, patient with no localizing signs or symptoms, blood / urine cultures negative, ascites fluid not consistent with SBP. CT A/P with groundglass opacities in lung, but patient already completed 7-days of pip-tazo for pneumonia coverage. Suspect leukocytosis in setting of alcoholic hepatitis superimposed on cirrhosis.    - Discontinue pip-tazo, fluconazole  - Monitor off antibiotics  - Transplant evaluation: serological work-up pending (particularly strongyloides and quant gold)  - Vaccines needed include HAV, HBV, prevnar, and influenza  - Transplant candidacy: pending review of workup detailed above; final psych evaluation            Anticipated Disposition: stabilization of liver function    Thank you for your consult. I will follow-up with patient. Please contact us if you have any additional questions.    Marcela Longoria MD  Infectious Disease  [facility]    Subjective:     Principal Problem:Alcoholic  hepatitis with ascites    HPI: No notes on file  Interval History:   - Patient complaining of abdominal distension    Review of Systems   Constitutional: Negative for chills, diaphoresis, fever and unexpected weight change.   HENT: Negative for congestion, mouth sores, rhinorrhea, sinus pain, sore throat and trouble swallowing.    Respiratory: Negative for cough, shortness of breath and stridor.    Cardiovascular: Negative for chest pain and leg swelling.   Gastrointestinal: Negative for abdominal pain, blood in stool, diarrhea, nausea and vomiting.   Genitourinary: Negative for dysuria, flank pain and genital sores.   Musculoskeletal: Negative for arthralgias, back pain, joint swelling, myalgias and neck stiffness.   Skin: Negative for rash and wound.   Neurological: Negative for light-headedness and headaches.   Hematological: Negative for adenopathy.     Objective:     Vital Signs (Most Recent):  Temp: 98.3 °F (36.8 °C) (10/06/17 1625)  Pulse: 106 (10/06/17 1900)  Resp: 20 (10/06/17 1625)  BP: 119/64 (10/06/17 1625)  SpO2: 99 % (10/06/17 1625) Vital Signs (24h Range):  Temp:  [98.1 °F (36.7 °C)-99 °F (37.2 °C)] 98.3 °F (36.8 °C)  Pulse:  [] 106  Resp:  [20-34] 20  SpO2:  [96 %-100 %] 99 %  BP: (115-132)/(53-69) 119/64     Weight: 83.6 kg (184 lb 4.9 oz)  Body mass index is 28.87 kg/m².    Estimated Creatinine Clearance: 87.5 mL/min (based on SCr of 1.3 mg/dL).    Physical Exam   Constitutional: He is oriented to person, place, and time. No distress.   HENT:   Mouth/Throat: Oropharynx is clear and moist. No oropharyngeal exudate.   Eyes: Scleral icterus is present.   Neck: Normal range of motion. Neck supple.   Cardiovascular: Normal rate and regular rhythm.  Exam reveals no friction rub.    No murmur heard.  Pulmonary/Chest: Effort normal and breath sounds normal. No respiratory distress. He has no wheezes. He has no rales.   Abdominal: Soft. Bowel sounds are normal. He exhibits distension. There is  tenderness. There is no rebound and no guarding.   NG tube in place   Musculoskeletal: Normal range of motion. He exhibits edema.   In wrist restraints   Lymphadenopathy:     He has no cervical adenopathy.   Neurological: He is alert and oriented to person, place, and time.   Skin: Skin is warm and dry. No rash noted. He is not diaphoretic. No erythema.   Jaundiced   Psychiatric:   Slow speech, but answers questions appropriately   Vitals reviewed.      Significant Labs: All pertinent labs within the past 24 hours have been reviewed.     10/5/2017  Ascites fluid  N36%      Significant Imaging: I have reviewed all pertinent imaging results/findings within the past 24 hours.

## 2017-10-07 NOTE — PROGRESS NOTES
"[facility]  Hepatology  Progress Note    Patient Name: Jhonny Diana  MRN: [id number]  Admission Date: 9/27/2017  Hospital Length of Stay: 10 days  Attending Provider: Mi Braun MD   Primary Care Physician: Provider Notinsystem  Principal Problem:Alcoholic hepatitis with ascites    Subjective:     Transplant status: No    HPI: 26 yo male with hx of childhood asthma and heavy alcohol abuse (fifth of liquor daily since teenager, last drink 2.5 wks ago per mother) who was transferred to [facility] for higher level of care/liver transplant evaluation.    History obtained per chart review and discussion with mother: Patient initially presented to OSH for a 2 mo hx of progressively worsening abdominal pain with associated fatigue, jaundice, and abdominal distention with ~20# weight gain.     The patient's mother states he began having abdominal pain about 6 months ago.  States he underwent many tests, all of which were inconclusive.  He then began to have abdominal swelling about two months ago and was in&out of the local ER.  He was still actively drinking up until about 2.5 weeks ago according to the mother but she is unsure of this.  At the OSH the patient underwent an EGD which showed g1EV.  He was also diagnosed with "pancreatitis" based on an elevated lipase.  The patient was also placed on prednisone given concerns for alcoholic hepatitis.           Interval History:   Overnight patient had very vivid dreams not hallucinations while awake.   Today he complains that he feels very tired and wants to rest.    Current Facility-Administered Medications   Medication    albumin human 25% bottle 25 g    famotidine tablet 20 mg    heparin (porcine) injection 5,000 Units    lactulose 20 gram/30 mL solution Soln 10 g    ondansetron injection 4 mg    pneumoc 13-arianne conj-dip cr(PF) 0.5 mL    potassium chloride 10% solution 40 mEq    potassium chloride 10% solution 40 mEq    potassium chloride 10% " solution 60 mEq    potassium, sodium phosphates 280-160-250 mg packet 2 packet    potassium, sodium phosphates 280-160-250 mg packet 2 packet    potassium, sodium phosphates 280-160-250 mg packet 2 packet    rifAXIMin tablet 550 mg    sodium bicarbonate tablet 650 mg       Objective:     Vital Signs (Most Recent):  Temp: 98.9 °F (37.2 °C) (10/07/17 1113)  Pulse: 104 (10/07/17 1127)  Resp: 18 (10/07/17 1113)  BP: 128/67 (10/07/17 1113)  SpO2: 98 % (10/07/17 1113) Vital Signs (24h Range):  Temp:  [98.1 °F (36.7 °C)-98.9 °F (37.2 °C)] 98.9 °F (37.2 °C)  Pulse:  [] 104  Resp:  [18-22] 18  SpO2:  [95 %-100 %] 98 %  BP: (118-130)/(54-68) 128/67     Weight: 83.6 kg (184 lb 4.9 oz) (10/06/17 0400)  Body mass index is 28.87 kg/m².    Physical Exam   Constitutional: No distress.   HENT:   Head: Normocephalic.   Eyes: Pupils are equal, round, and reactive to light. Scleral icterus is present.   Neck: Normal range of motion.   Cardiovascular:   Sinus tachycardia     Pulmonary/Chest: Effort normal and breath sounds normal. No respiratory distress. He has no wheezes. He has no rales. He exhibits no tenderness.   Abdominal: He exhibits distension. He exhibits no mass. There is no tenderness. There is no rebound and no guarding. No hernia.   Musculoskeletal: He exhibits no edema.   Neurological:   Patient alert but not completely oriented, Grade 1/2 encephalopathy   Skin: He is not diaphoretic.       MELD-Na score: 29 at 10/7/2017  4:03 AM  MELD score: 26 at 10/7/2017  4:03 AM  Calculated from:  Serum Creatinine: 1.4 mg/dL at 10/7/2017  4:03 AM  Serum Sodium: 131 mmol/L at 10/7/2017  4:03 AM  Total Bilirubin: 30.6 mg/dL at 10/7/2017  4:03 AM  INR(ratio): 1.4 at 10/7/2017  4:03 AM  Age: 27 years    Significant Labs:  CBC:   Recent Labs  Lab 10/07/17  0403   WBC 28.25*   RBC 2.61*   HGB 9.9*   HCT 28.4*   *     CMP:   Recent Labs  Lab 10/07/17  0403      CALCIUM 8.1*   ALBUMIN 2.0*   PROT 5.7*   *   K  4.4   CO2 13*      BUN 42*   CREATININE 1.4   ALKPHOS 170*   ALT 65*   *   BILITOT 30.6*     Coagulation:   Recent Labs  Lab 10/07/17  0403   INR 1.4*       Significant Imaging:  No recent imaging to review    Assessment/Plan:     Decompensated hepatic cirrhosis    27 year old male with a history of alcohol abuse transferred from [facility] for higher level of care. On admission his issues were alcoholic hepatitis, possible acute pancreatitis (4/5 BiSAP score, high mortality) MELD-Na score 34, acute renal injury, malnutrition, and debility.    Now in TSU with a mild increase in MELD secondary to decrease in sodium.    Recommendations:  - Continue liquid diet as well as tube feeds in the setting of AH goal for protein and calories is the following calorie 30-40kcal/day and protein 1.5 gram/kg/day; if able to tolerate it would not be unreasonable to advance liquid diet  - Continue Lactulose (can be titrated to ~ 3-5 BM per day) and Rifaximin  - Albumin 25% 25 g X 2 doses  - Obtain therapeutic paracentesis first thing Monday morning, if possible during the weekend that would be even better  - Addiction psych to visit with him again ?next week  - Continue daily PT/OT        Hyponatremia    Worsening hyponatremia in the setting of cirrhosis, patient does have ascites but could possibly be intravascular depleted. Therefore will obtain urine sodium and try a trial of albumin.    Recommendations:  - Albumin 25% 25 g X 2 doses  - Obtain urine sodium            Thank you for your consult. I will follow-up with patient. Please contact us if you have any additional questions.    Divya Booker M.D.  Gastroenterology Fellow, PGY-IV  Pager: [phone]  [facility]

## 2017-10-08 LAB
ALBUMIN SERPL BCP-MCNC: 2.6 G/DL
ALP SERPL-CCNC: 155 U/L
ALT SERPL W/O P-5'-P-CCNC: 59 U/L
ANION GAP SERPL CALC-SCNC: 12 MMOL/L
ANISOCYTOSIS BLD QL SMEAR: SLIGHT
AST SERPL-CCNC: 106 U/L
BACTERIA BLD CULT: NORMAL
BACTERIA BLD CULT: NORMAL
BASOPHILS # BLD AUTO: 0.03 K/UL
BASOPHILS NFR BLD: 0.1 %
BILIRUB DIRECT SERPL-MCNC: >14 MG/DL
BILIRUB SERPL-MCNC: 32.9 MG/DL
BUN SERPL-MCNC: 47 MG/DL
CALCIUM SERPL-MCNC: 8.5 MG/DL
CHLORIDE SERPL-SCNC: 105 MMOL/L
CO2 SERPL-SCNC: 14 MMOL/L
CREAT SERPL-MCNC: 1.5 MG/DL
DIFFERENTIAL METHOD: ABNORMAL
EOSINOPHIL # BLD AUTO: 0.1 K/UL
EOSINOPHIL NFR BLD: 0.3 %
ERYTHROCYTE [DISTWIDTH] IN BLOOD BY AUTOMATED COUNT: 14.4 %
EST. GFR  (AFRICAN AMERICAN): >60 ML/MIN/1.73 M^2
EST. GFR  (NON AFRICAN AMERICAN): >60 ML/MIN/1.73 M^2
GLUCOSE SERPL-MCNC: 131 MG/DL
HCT VFR BLD AUTO: 27.9 %
HGB BLD-MCNC: 9.6 G/DL
HYPOCHROMIA BLD QL SMEAR: ABNORMAL
INR PPP: 1.5
LYMPHOCYTES # BLD AUTO: 1.5 K/UL
LYMPHOCYTES NFR BLD: 5.6 %
MAGNESIUM SERPL-MCNC: 2.3 MG/DL
MCH RBC QN AUTO: 37.5 PG
MCHC RBC AUTO-ENTMCNC: 34.4 G/DL
MCV RBC AUTO: 109 FL
MONOCYTES # BLD AUTO: 1.6 K/UL
MONOCYTES NFR BLD: 6 %
NEUTROPHILS # BLD AUTO: 23.3 K/UL
NEUTROPHILS NFR BLD: 88 %
OVALOCYTES BLD QL SMEAR: ABNORMAL
PHOSPHATE SERPL-MCNC: 2.7 MG/DL
PLATELET # BLD AUTO: 130 K/UL
PMV BLD AUTO: 11.5 FL
POIKILOCYTOSIS BLD QL SMEAR: SLIGHT
POLYCHROMASIA BLD QL SMEAR: ABNORMAL
POTASSIUM SERPL-SCNC: 4.7 MMOL/L
PROT SERPL-MCNC: 6 G/DL
PROTHROMBIN TIME: 14.9 SEC
RBC # BLD AUTO: 2.56 M/UL
SODIUM SERPL-SCNC: 131 MMOL/L
WBC # BLD AUTO: 26.69 K/UL

## 2017-10-08 PROCEDURE — 99232 SBSQ HOSP IP/OBS MODERATE 35: CPT | Mod: ,,, | Performed by: INTERNAL MEDICINE

## 2017-10-08 PROCEDURE — 83735 ASSAY OF MAGNESIUM: CPT

## 2017-10-08 PROCEDURE — 36415 COLL VENOUS BLD VENIPUNCTURE: CPT

## 2017-10-08 PROCEDURE — 25000003 PHARM REV CODE 250: Performed by: CLINICAL NURSE SPECIALIST

## 2017-10-08 PROCEDURE — 80076 HEPATIC FUNCTION PANEL: CPT

## 2017-10-08 PROCEDURE — 99233 SBSQ HOSP IP/OBS HIGH 50: CPT | Mod: ,,, | Performed by: HOSPITALIST

## 2017-10-08 PROCEDURE — 84100 ASSAY OF PHOSPHORUS: CPT

## 2017-10-08 PROCEDURE — 20600001 HC STEP DOWN PRIVATE ROOM

## 2017-10-08 PROCEDURE — 25000003 PHARM REV CODE 250: Performed by: HOSPITALIST

## 2017-10-08 PROCEDURE — 25000003 PHARM REV CODE 250: Performed by: NURSE PRACTITIONER

## 2017-10-08 PROCEDURE — 80048 BASIC METABOLIC PNL TOTAL CA: CPT

## 2017-10-08 PROCEDURE — 25000003 PHARM REV CODE 250: Performed by: INTERNAL MEDICINE

## 2017-10-08 PROCEDURE — 63600175 PHARM REV CODE 636 W HCPCS: Performed by: NURSE PRACTITIONER

## 2017-10-08 PROCEDURE — 85610 PROTHROMBIN TIME: CPT

## 2017-10-08 PROCEDURE — 92610 EVALUATE SWALLOWING FUNCTION: CPT

## 2017-10-08 PROCEDURE — 85025 COMPLETE CBC W/AUTO DIFF WBC: CPT

## 2017-10-08 RX ORDER — SODIUM CHLORIDE 9 MG/ML
INJECTION, SOLUTION INTRAVENOUS CONTINUOUS
Status: ACTIVE | OUTPATIENT
Start: 2017-10-08 | End: 2017-10-09

## 2017-10-08 RX ADMIN — SODIUM CHLORIDE: 0.9 INJECTION, SOLUTION INTRAVENOUS at 12:10

## 2017-10-08 RX ADMIN — RIFAXIMIN 550 MG: 550 TABLET ORAL at 08:10

## 2017-10-08 RX ADMIN — HEPARIN SODIUM 5000 UNITS: 5000 INJECTION, SOLUTION INTRAVENOUS; SUBCUTANEOUS at 09:10

## 2017-10-08 RX ADMIN — HEPARIN SODIUM 5000 UNITS: 5000 INJECTION, SOLUTION INTRAVENOUS; SUBCUTANEOUS at 03:10

## 2017-10-08 RX ADMIN — FAMOTIDINE 20 MG: 20 TABLET, FILM COATED ORAL at 09:10

## 2017-10-08 RX ADMIN — HEPARIN SODIUM 5000 UNITS: 5000 INJECTION, SOLUTION INTRAVENOUS; SUBCUTANEOUS at 06:10

## 2017-10-08 RX ADMIN — SODIUM BICARBONATE 650 MG TABLET 650 MG: at 08:10

## 2017-10-08 RX ADMIN — FAMOTIDINE 20 MG: 20 TABLET, FILM COATED ORAL at 08:10

## 2017-10-08 RX ADMIN — LACTULOSE 10 G: 20 SOLUTION ORAL at 08:10

## 2017-10-08 RX ADMIN — RIFAXIMIN 550 MG: 550 TABLET ORAL at 09:10

## 2017-10-08 RX ADMIN — SODIUM BICARBONATE 650 MG TABLET 650 MG: at 09:10

## 2017-10-08 NOTE — ASSESSMENT & PLAN NOTE
· WBC remains elevated - decreased to 26  · Remains afebrile  · Repeat blood cultures NGTD  · UA negative  · CXR with broad ddx  · Resp cx sent but unlikely to grow  · ID following, appreciate assistance. Want to monitor off of abx

## 2017-10-08 NOTE — PROGRESS NOTES
"[facility]  Hepatology  Progress Note    Patient Name: Jhonny Diana  MRN: [id number]  Admission Date: 9/27/2017  Hospital Length of Stay: 11 days  Attending Provider: Mi Braun MD   Primary Care Physician: Provider Notinsystem  Principal Problem:Alcoholic hepatitis with ascites    Subjective:     Transplant status: No    HPI: 28 yo male with hx of childhood asthma and heavy alcohol abuse (fifth of liquor daily since teenager, last drink 2.5 wks ago per mother) who was transferred to [facility] [place] for higher level of care/liver transplant evaluation.    History obtained per chart review and discussion with mother: Patient initially presented to OSH for a 2 mo hx of progressively worsening abdominal pain with associated fatigue, jaundice, and abdominal distention with ~20# weight gain.     The patient's mother states he began having abdominal pain about 6 months ago.  States he underwent many tests, all of which were inconclusive.  He then began to have abdominal swelling about two months ago and was in&out of the local ER.  He was still actively drinking up until about 2.5 weeks ago according to the mother but she is unsure of this.  At the OSH the patient underwent an EGD which showed g1EV.  He was also diagnosed with "pancreatitis" based on an elevated lipase.  The patient was also placed on prednisone given concerns for alcoholic hepatitis.           Interval History:   No acute events overnight.    Current Facility-Administered Medications   Medication    0.9%  NaCl infusion    famotidine tablet 20 mg    heparin (porcine) injection 5,000 Units    lactulose 20 gram/30 mL solution Soln 10 g    ondansetron injection 4 mg    pneumoc 13-arianne conj-dip cr(PF) 0.5 mL    potassium chloride 10% solution 40 mEq    potassium chloride 10% solution 40 mEq    potassium chloride 10% solution 60 mEq    potassium, sodium phosphates 280-160-250 mg packet 2 packet    potassium, sodium phosphates 280-160-250 " mg packet 2 packet    potassium, sodium phosphates 280-160-250 mg packet 2 packet    rifAXIMin tablet 550 mg    sodium bicarbonate tablet 650 mg       Objective:     Vital Signs (Most Recent):  Temp: 99.7 °F (37.6 °C) (10/08/17 1124)  Pulse: 107 (10/08/17 1124)  Resp: 20 (10/08/17 1124)  BP: 138/75 (10/08/17 1124)  SpO2: 97 % (10/08/17 1124) Vital Signs (24h Range):  Temp:  [98.4 °F (36.9 °C)-99.7 °F (37.6 °C)] 99.7 °F (37.6 °C)  Pulse:  [101-111] 107  Resp:  [18-20] 20  SpO2:  [96 %-98 %] 97 %  BP: (128-138)/(65-75) 138/75     Weight: 83.6 kg (184 lb 4.9 oz) (10/06/17 0400)  Body mass index is 28.87 kg/m².    Physical Exam   Constitutional: No distress.   HENT:   Head: Normocephalic.   Eyes: Pupils are equal, round, and reactive to light. Scleral icterus is present.   Neck: Normal range of motion.   Cardiovascular:   Sinus tachycardia     Pulmonary/Chest: Effort normal and breath sounds normal.   Abdominal: Bowel sounds are normal. He exhibits distension. He exhibits no mass. There is no tenderness. There is no rebound and no guarding. No hernia.   Musculoskeletal: Normal range of motion. He exhibits no edema.   Neurological:   Oriented to person and place but not time, still has grade 1-2 encephalopathy    Skin: He is not diaphoretic.       MELD-Na score: 30 at 10/8/2017  6:59 AM  MELD score: 28 at 10/8/2017  6:59 AM  Calculated from:  Serum Creatinine: 1.5 mg/dL at 10/8/2017  6:59 AM  Serum Sodium: 131 mmol/L at 10/8/2017  6:59 AM  Total Bilirubin: 32.9 mg/dL at 10/8/2017  6:59 AM  INR(ratio): 1.5 at 10/8/2017  6:59 AM  Age: 27 years    Significant Labs:  CBC:   Recent Labs  Lab 10/08/17  0659   WBC 26.69*   RBC 2.56*   HGB 9.6*   HCT 27.9*   *     CMP:   Recent Labs  Lab 10/08/17  0659   *   CALCIUM 8.5*   ALBUMIN 2.6*   PROT 6.0   *   K 4.7   CO2 14*      BUN 47*   CREATININE 1.5*   ALKPHOS 155*   ALT 59*   *   BILITOT 32.9*     Coagulation:   Recent Labs  Lab 10/08/17  0659    INR 1.5*       Significant Imaging:  No recent imaging studies to review    Assessment/Plan:     Decompensated hepatic cirrhosis with ascites and HE    27 year old male with a history of alcohol abuse transferred from [facility] for higher level of care.On admission patient came in with severe AH and was pretty obtunded. He has made improvements in his mentation yet remains a bit confused which has limited the liver transplant eval.    Recommendations:  - Continue liquid diet (or advance if patient can tolerate it) as well as tube feeds in the setting of AH goal for protein and calories is the following: calorie 30-40kcal/day and protein 1.5 gram/kg/day  - Continue Lactulose (can be titrated to ~ 3-5 BM per day) and Rifaximin  - 1L NS over 24 hours (refer to hyponatremia assessment  - Obtain therapeutic/diagnostic paracentesis tomorrow monring  - Addiction psych to visit with him again (?next week)  - Continue daily PT/OT        Hyponatremia    Patient's sodium has been decrease and although albumin was given yesterday his sodium remains the same. Despite this we feel patient is still dry and would benefit from gentle fluids.    Recommendations:  - Fluid challenge with 1L of NS over a 24 hour period            Thank you for your consult. I will follow-up with patient. Please contact us if you have any additional questions.    Divya Booker M.D.  Gastroenterology Fellow, PGY-IV  Pager: [phone]  [facility]

## 2017-10-08 NOTE — PLAN OF CARE
Problem: Patient Care Overview  Goal: Plan of Care Review  Outcome: Ongoing (interventions implemented as appropriate)    Pt awake and alert. Disoriented to time and situation.    Pt motherSanjuanita is at the bedside.  Pt mother is attentive to and supportive of pt and actively involved in pt care.    Pt in non-violent wrist restraints for pulling at NGT.  Order for restraints active until 10/8 @ 0822.   See flowsheet for restraint charting.    VSS  Pt on tele running ST with hr low 100's-1 teens  BPs 130's/60's  Afebrile  Satting 96-97% on room air.    Pt has R nare NGT with isosource 1.5 @ 55 mL/hr. Goal 60 mL/hr.  Residual at 8PM was 325 mL.    Pt on clear liquids with very minimal oral intake.  ST is consulted.  Meds given crushed through NGT.    Pt up to bedside commode with 2 person assist.  Pt has had 1 BM and 1 urine occurence.    L side of bottom lip bleeding. Pt mother reports pt has blood blister there that began bleeding.  Able to stop bleeding and applied lip moisturizer from oral care kit.     Pt abdomen is very distended.  Plan for pt to have therapeutic paracentesis this week. Likely Monday.    Pt remained free from falls or injury thus far.   Bed is in low/ locked position, side rails are up x 2, call light is in reach.   Will continue to monitor.

## 2017-10-08 NOTE — ASSESSMENT & PLAN NOTE
27 year old male with a history of alcohol abuse transferred from [facility] for higher level of care.On admission patient came in with severe AH and was pretty obtunded. He has made improvements in his mentation yet remains a bit confused which has limited the liver transplant eval.    Recommendations:  - Continue liquid diet (or advance if patient can tolerate it) as well as tube feeds in the setting of AH goal for protein and calories is the following: calorie 30-40kcal/day and protein 1.5 gram/kg/day  - Continue Lactulose (can be titrated to ~ 3-5 BM per day) and Rifaximin  - 1L NS over 24 hours (refer to hyponatremia assessment  - Obtain therapeutic/diagnostic paracentesis tomorrow monring  - Addiction psych to visit with him again (?next week)  - Continue daily PT/OT

## 2017-10-08 NOTE — SUBJECTIVE & OBJECTIVE
Interval History: No events overnight.  Having BMs.  Passed SLP eval - able to have diet. Confusion improving.    Review of Systems   Constitutional: Negative for chills, fatigue and fever.   HENT: Negative for sore throat and trouble swallowing.    Eyes: Negative for photophobia and visual disturbance.   Respiratory: Negative for cough and shortness of breath.    Cardiovascular: Negative for chest pain, palpitations and leg swelling.   Gastrointestinal: Positive for abdominal distention. Negative for abdominal pain, constipation, diarrhea, nausea and vomiting.   Endocrine: Negative for cold intolerance and heat intolerance.   Genitourinary: Negative for dysuria and frequency.   Musculoskeletal: Negative for arthralgias and myalgias.   Skin: Positive for color change. Negative for rash and wound.   Neurological: Negative for dizziness, syncope, weakness and light-headedness.   Psychiatric/Behavioral: Positive for confusion. Negative for hallucinations.   All other systems reviewed and are negative.    Objective:     Vital Signs (Most Recent):  Temp: 99.7 °F (37.6 °C) (10/08/17 1124)  Pulse: 107 (10/08/17 1124)  Resp: 20 (10/08/17 1124)  BP: 138/75 (10/08/17 1124)  SpO2: 97 % (10/08/17 1124) Vital Signs (24h Range):  Temp:  [98.4 °F (36.9 °C)-99.7 °F (37.6 °C)] 99.7 °F (37.6 °C)  Pulse:  [101-111] 107  Resp:  [18-20] 20  SpO2:  [96 %-98 %] 97 %  BP: (128-138)/(65-75) 138/75     Weight: 83.6 kg (184 lb 4.9 oz)  Body mass index is 28.87 kg/m².    Intake/Output Summary (Last 24 hours) at 10/08/17 1157  Last data filed at 10/08/17 0730   Gross per 24 hour   Intake             2345 ml   Output              500 ml   Net             1845 ml      Physical Exam   Constitutional: He appears well-developed and well-nourished. He is cooperative.  Non-toxic appearance. No distress.   HENT:   Head: Normocephalic and atraumatic.   Mouth/Throat: Oropharynx is clear and moist.   Eyes: EOM are normal. Pupils are equal, round, and  reactive to light. Scleral icterus is present.   Neck: Normal range of motion. Neck supple.   Cardiovascular: Regular rhythm, normal heart sounds and normal pulses.  Tachycardia present.    No murmur heard.  Sinus tachycardia   Pulmonary/Chest: Effort normal. No respiratory distress. He has decreased breath sounds in the right lower field and the left lower field. He has no wheezes. He has no rales.   Abdominal: Soft. Normal appearance. He exhibits distension. He exhibits no mass. Bowel sounds are decreased. There is no tenderness. There is no rebound and no guarding. No hernia.   Musculoskeletal: Normal range of motion. He exhibits no edema.   Lymphadenopathy:     He has no cervical adenopathy.   Neurological: He is alert. He is not disoriented.   Oriented to person and place today   Skin: Skin is warm and dry. He is not diaphoretic.   Old blister/ wart on right plantar surface of the foot near the big toe   Psychiatric: He has a normal mood and affect. His behavior is normal. His affect is not labile. His speech is delayed. Cognition and memory are not impaired.   Nursing note and vitals reviewed.      Significant Labs:   CBC:     Recent Labs  Lab 10/07/17  0403 10/08/17  0659   WBC 28.25* 26.69*   HGB 9.9* 9.6*   HCT 28.4* 27.9*   * 130*     CMP:     Recent Labs  Lab 10/07/17  0403 10/08/17  0659   * 131*   K 4.4 4.7    105   CO2 13* 14*    131*   BUN 42* 47*   CREATININE 1.4 1.5*   CALCIUM 8.1* 8.5*   PROT 5.7* 6.0   ALBUMIN 2.0* 2.6*   BILITOT 30.6* 32.9*   ALKPHOS 170* 155*   * 106*   ALT 65* 59*   ANIONGAP 11 12   EGFRNONAA >60.0 >60.0     Coagulation:     Recent Labs  Lab 10/08/17  0659   INR 1.5*     All pertinent labs within the past 24 hours have been reviewed.    Significant Imaging: CXR: I have reviewed all pertinent results/findings within the past 24 hours and my personal findings are:  NG in appropriate position

## 2017-10-08 NOTE — ASSESSMENT & PLAN NOTE
Patient's sodium has been decrease and although albumin was given yesterday his sodium remains the same. Despite this we feel patient is still dry and would benefit from gentle fluids.    Recommendations:  - Fluid challenge with 1L of NS over a 24 hour period

## 2017-10-08 NOTE — CONSULTS
Received consult for addictions psychiatry for liver transplant evaluation. Addictions team will be in tomorrow and will perform eval at that time. Thank you.     Case discussed with: Dr. Scarlet Flaherty MD  [facility] Psychiatry, PGY-2  10/08/2017 12:43 PM

## 2017-10-08 NOTE — PROGRESS NOTES
"[facility] Hospital Medicine  Progress Note    Patient Name: Jhonny Diana  MRN: [id number]  Patient Class: IP- Inpatient   Admission Date: 9/27/2017  Length of Stay: 11 days  Attending Physician: Mi Braun MD  Primary Care Provider: Provider [facility] Medicine Team: [facility] HOSP MED S Mi Braun MD    Subjective:     Principal Problem:Alcoholic hepatitis with ascites    HPI:  26 yo male with hx of childhood asthma and heavy alcohol abuse (fifth of liquor daily since teenager, last drink 2.5 wks ago per mother) who was transferred to [facility] for higher level of care/liver transplant evaluation. Patient initially presented to OSH for a 2 mo hx of progressively worsening abdominal pain with associated fatigue, jaundice, and abdominal distention with ~20# weight gain. HPI given by patient's mother and OSH records as patient's participation in interview limited by previous administration of pain medication at OSH.      Per mother, patient did not have a fever, chills, dark stools, known sick contacts, nausea, vomiting, diarrhea, confusion, or HA. Reports family hx of alcoholic cirrhosis in father. Pt was recently diagnosed with alcoholic hepatitis during his admission at OSH on 9/6-9/15. During that hospitalization, patient was given ceftriaxone for SBP ppx, prednisone for alcoholic hepatitis, and diuresed. Pt was discharged and instructed to follow up with PCP and AA within 1 wk. After his discharge, patient returned to ED 4 days after for increased weight gain and worsening abdominal pain/jaundice. At OSH, patient was given fluids (for suspected pancreatitis), diuresed and given IV albumin. Two days prior to transfer to [facility], patient had an episode of hematemesis and underwent an EGD on 9/25 that revealed "1 sm varix in distal esophagus, no stigmata of bleeding, gastropathy of portal hypertension, and reflux esophagitis".  No further episodes of hematemesis noted since initial " episode.      Recent labs at OSH: Na 134, CO2 18, BUN 36, Cr 2, Tprot 5.9, albumin 1.9, Tbili 24, alk phos 83, , ALT51. Patient also had lipase 532 and ammonia 118 during his previous hospitalization at OSH. Labwork also notable for negative mitochondrial ab, sm muscle ab, and ceruloplasmin 22. Imaging at OSH revealing abdo U/S (9/20): hepatosplenomegaly, CT abdo w contrast: hepatomegaly with marked fatty infiltration of the liver and mild ascites (9/6). No CDs available for review.    Hospital Course:  Mr. Diana was admitted to the CMICU on 9/27 for decompensated alcoholic hepatitis with high MELD score, severe HE, and LAURA. Hepatology consulted and following to evaluate liver transplant candidacy. Patient not currently a transplant candidate due to recent alcohol use; recommending re-evaluation by addiction psych and social work when more medically stable and able to participate. Pt started on albumin and lasix for LAURA and possibility for HRS. However nephrology felt that LAURA is likely multifactorial: ATN given hypotensive episodes at OSH and toxic given severe hyperbilirubinemia; not truly HRS. Lactulose/rifaximin started for HE and mental status improving slowly. Mr. Diana has had significant ABD pain since admission with ABD distension. CT abdomen was significant for pericolonic fat stranding of right colon; no obstruction. Also with diffuse mesenteric edema. No significant ascites noted, no cholecystitis/cholelithiasis/ductal dilation. US liver with doppler showed appropriate vasculature.  He was stepped down to Hospital Medicine on 10/5.    Interval History: No events overnight.  Having BMs.  Passed SLP eval - able to have diet. Confusion improving.    Review of Systems   Constitutional: Negative for chills, fatigue and fever.   HENT: Negative for sore throat and trouble swallowing.    Eyes: Negative for photophobia and visual disturbance.   Respiratory: Negative for cough and shortness of breath.     Cardiovascular: Negative for chest pain, palpitations and leg swelling.   Gastrointestinal: Positive for abdominal distention. Negative for abdominal pain, constipation, diarrhea, nausea and vomiting.   Endocrine: Negative for cold intolerance and heat intolerance.   Genitourinary: Negative for dysuria and frequency.   Musculoskeletal: Negative for arthralgias and myalgias.   Skin: Positive for color change. Negative for rash and wound.   Neurological: Negative for dizziness, syncope, weakness and light-headedness.   Psychiatric/Behavioral: Positive for confusion. Negative for hallucinations.   All other systems reviewed and are negative.    Objective:     Vital Signs (Most Recent):  Temp: 99.7 °F (37.6 °C) (10/08/17 1124)  Pulse: 107 (10/08/17 1124)  Resp: 20 (10/08/17 1124)  BP: 138/75 (10/08/17 1124)  SpO2: 97 % (10/08/17 1124) Vital Signs (24h Range):  Temp:  [98.4 °F (36.9 °C)-99.7 °F (37.6 °C)] 99.7 °F (37.6 °C)  Pulse:  [101-111] 107  Resp:  [18-20] 20  SpO2:  [96 %-98 %] 97 %  BP: (128-138)/(65-75) 138/75     Weight: 83.6 kg (184 lb 4.9 oz)  Body mass index is 28.87 kg/m².    Intake/Output Summary (Last 24 hours) at 10/08/17 1157  Last data filed at 10/08/17 0730   Gross per 24 hour   Intake             2345 ml   Output              500 ml   Net             1845 ml      Physical Exam   Constitutional: He appears well-developed and well-nourished. He is cooperative.  Non-toxic appearance. No distress.   HENT:   Head: Normocephalic and atraumatic.   Mouth/Throat: Oropharynx is clear and moist.   Eyes: EOM are normal. Pupils are equal, round, and reactive to light. Scleral icterus is present.   Neck: Normal range of motion. Neck supple.   Cardiovascular: Regular rhythm, normal heart sounds and normal pulses.  Tachycardia present.    No murmur heard.  Sinus tachycardia   Pulmonary/Chest: Effort normal. No respiratory distress. He has decreased breath sounds in the right lower field and the left lower field. He  has no wheezes. He has no rales.   Abdominal: Soft. Normal appearance. He exhibits distension. He exhibits no mass. Bowel sounds are decreased. There is no tenderness. There is no rebound and no guarding. No hernia.   Musculoskeletal: Normal range of motion. He exhibits no edema.   Lymphadenopathy:     He has no cervical adenopathy.   Neurological: He is alert. He is not disoriented.   Oriented to person and place today   Skin: Skin is warm and dry. He is not diaphoretic.   Old blister/ wart on right plantar surface of the foot near the big toe   Psychiatric: He has a normal mood and affect. His behavior is normal. His affect is not labile. His speech is delayed. Cognition and memory are not impaired.   Nursing note and vitals reviewed.      Significant Labs:   CBC:     Recent Labs  Lab 10/07/17  0403 10/08/17  0659   WBC 28.25* 26.69*   HGB 9.9* 9.6*   HCT 28.4* 27.9*   * 130*     CMP:     Recent Labs  Lab 10/07/17  0403 10/08/17  0659   * 131*   K 4.4 4.7    105   CO2 13* 14*    131*   BUN 42* 47*   CREATININE 1.4 1.5*   CALCIUM 8.1* 8.5*   PROT 5.7* 6.0   ALBUMIN 2.0* 2.6*   BILITOT 30.6* 32.9*   ALKPHOS 170* 155*   * 106*   ALT 65* 59*   ANIONGAP 11 12   EGFRNONAA >60.0 >60.0     Coagulation:     Recent Labs  Lab 10/08/17  0659   INR 1.5*     All pertinent labs within the past 24 hours have been reviewed.    Significant Imaging: CXR: I have reviewed all pertinent results/findings within the past 24 hours and my personal findings are:  NG in appropriate position    Assessment/Plan:      * Alcoholic hepatitis with ascites    · Appreciate Hepatology assistance  · Continue Lactulose and Rifaximin for HE  · Currently not transplant candidate per Psych and Hep.  Addiction Psych consult placed for tomorrow because of improving mentation  · IR para ordered for therapeutic tap  · Stressed the importance of high calorie diet to prevent NGT insertion again - Boost TID    MELD-Na score: 30  at 10/8/2017  6:59 AM  MELD score: 28 at 10/8/2017  6:59 AM  Calculated from:  Serum Creatinine: 1.5 mg/dL at 10/8/2017  6:59 AM  Serum Sodium: 131 mmol/L at 10/8/2017  6:59 AM  Total Bilirubin: 32.9 mg/dL at 10/8/2017  6:59 AM  INR(ratio): 1.5 at 10/8/2017  6:59 AM  Age: 27 years            Coagulopathy    · 2/2 liver disease  · No bleeding; Monitor          Decompensated hepatic cirrhosis    See alcoholic hepatitis with ascites          History of hematemesis    · Pt had an episode of hematemesis at OSH and EGD that revealed 1 small varix, reflux esophagitis, and gastropathy of portal hypertension  · No evidence of bleeding currently and stable hemgolobin  · Continue Famotidine BID as it is a suspension        Hepatic encephalopathy    · Mentation improving  · Continue Lactulose and Rifaxmin          Alcoholism /alcohol abuse    · Reported about a fifth of whiskey daily since teenager  · Appreciate Psych and Hep assistance; currently high risk for liver transplant.  Addiction Psych reconsulted  · PETH 174 on admit, 9/27        Metabolic acidosis with normal anion gap and bicarbonate losses    · 2/2 diarrhea given lactulose.  Currently on 10g daily with improving BMs  · Still acidotic, will monitor  · Sodium Bicarb BID          Acute renal failure with tubular necrosis    · Appreciate Nephro's help  · Suspect LAURA multifactorial: ischemic ATN given hypotension and injury due to hyperbilirubinemia. Felt less likely to be HRS however have continued albumin with lasix  · Overall, LAURA improving and making adequate urine.  Not measuring amounts as he pulls off condom catheter and is in diapers - but mother reports frequent wet diapers          Leukocytosis    · WBC remains elevated - decreased to 26  · Remains afebrile  · Repeat blood cultures NGTD  · UA negative  · CXR with broad ddx  · Resp cx sent but unlikely to grow  · ID following, appreciate assistance. Want to monitor off of abx          Severe malnutrition     · SLP consulted and ok for dental soft diet  · Dietary consulted  · Stressed the importance of eating high protein high calorie to prevent NGT re-insertion with TF  · Calorie counts and Boost TID        Anasarca    · 2/2 liver disease and hypoalbuminemia          Hyponatremia    · Sodium 131  · Gentle IVF x 24h            VTE Risk Mitigation         Ordered     heparin (porcine) injection 5,000 Units  Every 8 hours     Route:  Subcutaneous        10/03/17 1740     Place sequential compression device  Until discontinued      09/28/17 0238     Medium Risk of VTE  Once      09/27/17 4877              Mi Braun MD  Department of Hospital Medicine   [facility]

## 2017-10-08 NOTE — ASSESSMENT & PLAN NOTE
· Appreciate Hepatology assistance  · Continue Lactulose and Rifaximin for HE  · Currently not transplant candidate per Psych and Hep.  Addiction Psych consult placed for tomorrow because of improving mentation  · IR para ordered for therapeutic tap  · Stressed the importance of high calorie diet to prevent NGT insertion again - Boost TID    MELD-Na score: 30 at 10/8/2017  6:59 AM  MELD score: 28 at 10/8/2017  6:59 AM  Calculated from:  Serum Creatinine: 1.5 mg/dL at 10/8/2017  6:59 AM  Serum Sodium: 131 mmol/L at 10/8/2017  6:59 AM  Total Bilirubin: 32.9 mg/dL at 10/8/2017  6:59 AM  INR(ratio): 1.5 at 10/8/2017  6:59 AM  Age: 27 years

## 2017-10-08 NOTE — ASSESSMENT & PLAN NOTE
· SLP consulted and ok for dental soft diet  · Dietary consulted  · Stressed the importance of eating high protein high calorie to prevent NGT re-insertion with TF  · Calorie counts and Boost TID

## 2017-10-08 NOTE — PT/OT/SLP EVAL
Speech Language Pathology  Evaluation    Jhonny Diana   MRN: [id number]   Admitting Diagnosis: Alcoholic hepatitis with ascites    Diet recommendations: Solid Diet Level: Dental Soft  Liquid Diet Level: Thin Feed only when awake/alert, HOB to 90 degrees, Small bites/sips and Alternating bites/sips    SLP Treatment Date: 10/08/17  Speech Start Time: 0909     Speech Stop Time: 0919     Speech Total (min): 10 min       TREATMENT BILLABLE MINUTES:  Eval Swallow and Oral Function 10    Diagnosis: Alcoholic hepatitis with ascites      Past Medical History:   Diagnosis Date    Hypertension      Past Surgical History:   Procedure Laterality Date    APPENDECTOMY         Has the patient been evaluated by SLP for swallowing? : Yes  Keep patient NPO?: No   General Precautions: Standard, fall, aspiration      Prior diet: Regular/thin.    Subjective:  Awake/alert    Pain/Comfort  Pain Rating 1: 0/10  Pain Rating Post-Intervention 1: 0/10    Objective:        Oral Musculature Evaluation  Oral Musculature: WFL  Dentition: present and adequate  Mucosal Quality: good  Mandibular Strength and Mobility: WFL  Oral Labial Strength and Mobility: WFL  Lingual Strength and Mobility: WFL  Volitional Cough: adequate  Voice Prior to PO Intake: clear     Bedside Swallow Eval:  Consistencies Assessed: Thin liquids x6 oz cup/straw, Puree x1 and Solids x2  Oral Phase: WFL  Pharyngeal Phase: no overt clinical  signs/symptoms of aspiration      Assessment:  Jhonny Diana is a 27 y.o. male with a medical diagnosis of Alcoholic hepatitis with ascites and presents with Dysphagia.          Discharge recommendations: Discharge Facility/Level Of Care Needs: rehabilitation facility     Goals:    SLP Goals        Problem: SLP Goal    Goal Priority Disciplines Outcome   SLP Goal     SLP    Description:  Speech Language Pathology Goals  Goals expected to be met by 10/15    1. Pt will tolerate dental soft diet/thin liquids without overt s/s of aspiration  2. Pt  will tolerate regular trials without overt s/s of aspiration                     Plan:   Patient to be seen Therapy Frequency: 5 x/week   Plan of Care expires:    Plan of Care reviewed with: patient, mother  SLP Follow-up?: Yes              Elvia Howe CCC-SLP   Speech Language Pathologist  Pager [phone]  10/08/2017

## 2017-10-08 NOTE — ASSESSMENT & PLAN NOTE
· Reported about a fifth of whiskey daily since teenager  · Appreciate Psych and Hep assistance; currently high risk for liver transplant.  Addiction Psych reconsulted  · PETH 174 on admit, 9/27

## 2017-10-08 NOTE — SUBJECTIVE & OBJECTIVE
Interval History:   No acute events overnight.    Current Facility-Administered Medications   Medication    0.9%  NaCl infusion    famotidine tablet 20 mg    heparin (porcine) injection 5,000 Units    lactulose 20 gram/30 mL solution Soln 10 g    ondansetron injection 4 mg    pneumoc 13-arianne conj-dip cr(PF) 0.5 mL    potassium chloride 10% solution 40 mEq    potassium chloride 10% solution 40 mEq    potassium chloride 10% solution 60 mEq    potassium, sodium phosphates 280-160-250 mg packet 2 packet    potassium, sodium phosphates 280-160-250 mg packet 2 packet    potassium, sodium phosphates 280-160-250 mg packet 2 packet    rifAXIMin tablet 550 mg    sodium bicarbonate tablet 650 mg       Objective:     Vital Signs (Most Recent):  Temp: 99.7 °F (37.6 °C) (10/08/17 1124)  Pulse: 107 (10/08/17 1124)  Resp: 20 (10/08/17 1124)  BP: 138/75 (10/08/17 1124)  SpO2: 97 % (10/08/17 1124) Vital Signs (24h Range):  Temp:  [98.4 °F (36.9 °C)-99.7 °F (37.6 °C)] 99.7 °F (37.6 °C)  Pulse:  [101-111] 107  Resp:  [18-20] 20  SpO2:  [96 %-98 %] 97 %  BP: (128-138)/(65-75) 138/75     Weight: 83.6 kg (184 lb 4.9 oz) (10/06/17 0400)  Body mass index is 28.87 kg/m².    Physical Exam   Constitutional: No distress.   HENT:   Head: Normocephalic.   Eyes: Pupils are equal, round, and reactive to light. Scleral icterus is present.   Neck: Normal range of motion.   Cardiovascular:   Sinus tachycardia     Pulmonary/Chest: Effort normal and breath sounds normal.   Abdominal: Bowel sounds are normal. He exhibits distension. He exhibits no mass. There is no tenderness. There is no rebound and no guarding. No hernia.   Musculoskeletal: Normal range of motion. He exhibits no edema.   Neurological:   Oriented to person and place but not time, still has grade 1-2 encephalopathy    Skin: He is not diaphoretic.       MELD-Na score: 30 at 10/8/2017  6:59 AM  MELD score: 28 at 10/8/2017  6:59 AM  Calculated from:  Serum Creatinine: 1.5 mg/dL  at 10/8/2017  6:59 AM  Serum Sodium: 131 mmol/L at 10/8/2017  6:59 AM  Total Bilirubin: 32.9 mg/dL at 10/8/2017  6:59 AM  INR(ratio): 1.5 at 10/8/2017  6:59 AM  Age: 27 years    Significant Labs:  CBC:   Recent Labs  Lab 10/08/17  0659   WBC 26.69*   RBC 2.56*   HGB 9.6*   HCT 27.9*   *     CMP:   Recent Labs  Lab 10/08/17  0659   *   CALCIUM 8.5*   ALBUMIN 2.6*   PROT 6.0   *   K 4.7   CO2 14*      BUN 47*   CREATININE 1.5*   ALKPHOS 155*   ALT 59*   *   BILITOT 32.9*     Coagulation:   Recent Labs  Lab 10/08/17  0659   INR 1.5*       Significant Imaging:  No recent imaging studies to review

## 2017-10-08 NOTE — ASSESSMENT & PLAN NOTE
· 2/2 diarrhea given lactulose.  Currently on 10g daily with improving BMs  · Still acidotic, will monitor  · Sodium Bicarb BID

## 2017-10-09 LAB
ALBUMIN SERPL BCP-MCNC: 2.3 G/DL
ALLENS TEST: ABNORMAL
ALP SERPL-CCNC: 148 U/L
ALT SERPL W/O P-5'-P-CCNC: 59 U/L
ANION GAP SERPL CALC-SCNC: 11 MMOL/L
ANISOCYTOSIS BLD QL SMEAR: SLIGHT
AST SERPL-CCNC: 115 U/L
BACTERIA SPEC AEROBE CULT: NORMAL
BASOPHILS # BLD AUTO: 0.03 K/UL
BASOPHILS NFR BLD: 0.1 %
BILIRUB DIRECT SERPL-MCNC: >14 MG/DL
BILIRUB SERPL-MCNC: 30.6 MG/DL
BUN SERPL-MCNC: 49 MG/DL
CALCIUM SERPL-MCNC: 8.4 MG/DL
CHLORIDE SERPL-SCNC: 105 MMOL/L
CO2 SERPL-SCNC: 13 MMOL/L
CREAT SERPL-MCNC: 1.3 MG/DL
DELSYS: ABNORMAL
DIFFERENTIAL METHOD: ABNORMAL
EOSINOPHIL # BLD AUTO: 0.1 K/UL
EOSINOPHIL NFR BLD: 0.3 %
ERYTHROCYTE [DISTWIDTH] IN BLOOD BY AUTOMATED COUNT: 14.7 %
ERYTHROCYTE [SEDIMENTATION RATE] IN BLOOD BY WESTERGREN METHOD: 22 MM/H
EST. GFR  (AFRICAN AMERICAN): >60 ML/MIN/1.73 M^2
EST. GFR  (NON AFRICAN AMERICAN): >60 ML/MIN/1.73 M^2
FIO2: 21
GLUCOSE SERPL-MCNC: 120 MG/DL
GRAM STN SPEC: NORMAL
HCO3 UR-SCNC: 15.5 MMOL/L (ref 24–28)
HCT VFR BLD AUTO: 25.3 %
HGB BLD-MCNC: 8.9 G/DL
HYPOCHROMIA BLD QL SMEAR: ABNORMAL
INR PPP: 1.5
LYMPHOCYTES # BLD AUTO: 1.2 K/UL
LYMPHOCYTES NFR BLD: 4.6 %
MAGNESIUM SERPL-MCNC: 2.1 MG/DL
MCH RBC QN AUTO: 37.9 PG
MCHC RBC AUTO-ENTMCNC: 35.2 G/DL
MCV RBC AUTO: 108 FL
MODE: ABNORMAL
MONOCYTES # BLD AUTO: 2.3 K/UL
MONOCYTES NFR BLD: 8.6 %
NEUTROPHILS # BLD AUTO: 22.9 K/UL
NEUTROPHILS NFR BLD: 86.4 %
OVALOCYTES BLD QL SMEAR: ABNORMAL
PCO2 BLDA: 23.1 MMHG (ref 35–45)
PH SMN: 7.43 [PH] (ref 7.35–7.45)
PHOSPHATE SERPL-MCNC: 2.9 MG/DL
PLATELET # BLD AUTO: 143 K/UL
PMV BLD AUTO: ABNORMAL FL
PO2 BLDA: 73 MMHG (ref 40–60)
POC BE: -9 MMOL/L
POC SATURATED O2: 95 % (ref 95–100)
POC TCO2: 16 MMOL/L (ref 24–29)
POIKILOCYTOSIS BLD QL SMEAR: SLIGHT
POLYCHROMASIA BLD QL SMEAR: ABNORMAL
POTASSIUM SERPL-SCNC: 4.7 MMOL/L
PROT SERPL-MCNC: 5.8 G/DL
PROTHROMBIN TIME: 14.9 SEC
RBC # BLD AUTO: 2.35 M/UL
SAMPLE: ABNORMAL
SITE: ABNORMAL
SODIUM SERPL-SCNC: 129 MMOL/L
SP02: 98
STRONGYLOIDES ANTIBODY IGG: NEGATIVE
VARICELLA INTERPRETATION: POSITIVE
VARICELLA ZOSTER IGG: 2.3 ISR
WBC # BLD AUTO: 26.7 K/UL

## 2017-10-09 PROCEDURE — 84300 ASSAY OF URINE SODIUM: CPT

## 2017-10-09 PROCEDURE — 97803 MED NUTRITION INDIV SUBSEQ: CPT | Performed by: DIETITIAN, REGISTERED

## 2017-10-09 PROCEDURE — 99231 SBSQ HOSP IP/OBS SF/LOW 25: CPT | Mod: ,,, | Performed by: INTERNAL MEDICINE

## 2017-10-09 PROCEDURE — 80048 BASIC METABOLIC PNL TOTAL CA: CPT

## 2017-10-09 PROCEDURE — 25000003 PHARM REV CODE 250: Performed by: NURSE PRACTITIONER

## 2017-10-09 PROCEDURE — 36415 COLL VENOUS BLD VENIPUNCTURE: CPT

## 2017-10-09 PROCEDURE — 99233 SBSQ HOSP IP/OBS HIGH 50: CPT | Mod: ,,, | Performed by: HOSPITALIST

## 2017-10-09 PROCEDURE — 92526 ORAL FUNCTION THERAPY: CPT

## 2017-10-09 PROCEDURE — 97530 THERAPEUTIC ACTIVITIES: CPT

## 2017-10-09 PROCEDURE — 83935 ASSAY OF URINE OSMOLALITY: CPT

## 2017-10-09 PROCEDURE — 85025 COMPLETE CBC W/AUTO DIFF WBC: CPT

## 2017-10-09 PROCEDURE — 86480 TB TEST CELL IMMUN MEASURE: CPT

## 2017-10-09 PROCEDURE — 97535 SELF CARE MNGMENT TRAINING: CPT

## 2017-10-09 PROCEDURE — 25000003 PHARM REV CODE 250: Performed by: HOSPITALIST

## 2017-10-09 PROCEDURE — 85610 PROTHROMBIN TIME: CPT

## 2017-10-09 PROCEDURE — 80076 HEPATIC FUNCTION PANEL: CPT

## 2017-10-09 PROCEDURE — 99232 SBSQ HOSP IP/OBS MODERATE 35: CPT | Mod: ,,, | Performed by: PSYCHIATRY & NEUROLOGY

## 2017-10-09 PROCEDURE — 20600001 HC STEP DOWN PRIVATE ROOM

## 2017-10-09 PROCEDURE — 83735 ASSAY OF MAGNESIUM: CPT

## 2017-10-09 PROCEDURE — 25000003 PHARM REV CODE 250: Performed by: INTERNAL MEDICINE

## 2017-10-09 PROCEDURE — 25000003 PHARM REV CODE 250: Performed by: CLINICAL NURSE SPECIALIST

## 2017-10-09 PROCEDURE — 63600175 PHARM REV CODE 636 W HCPCS: Performed by: NURSE PRACTITIONER

## 2017-10-09 PROCEDURE — 97116 GAIT TRAINING THERAPY: CPT

## 2017-10-09 PROCEDURE — 84100 ASSAY OF PHOSPHORUS: CPT

## 2017-10-09 PROCEDURE — 99900035 HC TECH TIME PER 15 MIN (STAT)

## 2017-10-09 PROCEDURE — 0W9G3ZZ DRAINAGE OF PERITONEAL CAVITY, PERCUTANEOUS APPROACH: ICD-10-PCS | Performed by: RADIOLOGY

## 2017-10-09 RX ORDER — PANTOPRAZOLE SODIUM 40 MG/1
40 TABLET, DELAYED RELEASE ORAL
Status: DISCONTINUED | OUTPATIENT
Start: 2017-10-09 | End: 2017-10-20

## 2017-10-09 RX ORDER — SODIUM BICARBONATE 650 MG/1
1300 TABLET ORAL 3 TIMES DAILY
Status: DISCONTINUED | OUTPATIENT
Start: 2017-10-09 | End: 2017-10-16

## 2017-10-09 RX ADMIN — HEPARIN SODIUM 5000 UNITS: 5000 INJECTION, SOLUTION INTRAVENOUS; SUBCUTANEOUS at 01:10

## 2017-10-09 RX ADMIN — PANTOPRAZOLE SODIUM 40 MG: 40 TABLET, DELAYED RELEASE ORAL at 03:10

## 2017-10-09 RX ADMIN — SODIUM BICARBONATE 650 MG TABLET 1300 MG: at 08:10

## 2017-10-09 RX ADMIN — SODIUM BICARBONATE 650 MG TABLET 650 MG: at 11:10

## 2017-10-09 RX ADMIN — HEPARIN SODIUM 5000 UNITS: 5000 INJECTION, SOLUTION INTRAVENOUS; SUBCUTANEOUS at 06:10

## 2017-10-09 RX ADMIN — LACTULOSE 10 G: 20 SOLUTION ORAL at 11:10

## 2017-10-09 RX ADMIN — FAMOTIDINE 20 MG: 20 TABLET, FILM COATED ORAL at 11:10

## 2017-10-09 RX ADMIN — RIFAXIMIN 550 MG: 550 TABLET ORAL at 08:10

## 2017-10-09 RX ADMIN — HEPARIN SODIUM 5000 UNITS: 5000 INJECTION, SOLUTION INTRAVENOUS; SUBCUTANEOUS at 08:10

## 2017-10-09 RX ADMIN — SODIUM BICARBONATE 650 MG TABLET 1300 MG: at 01:10

## 2017-10-09 RX ADMIN — RIFAXIMIN 550 MG: 550 TABLET ORAL at 11:10

## 2017-10-09 NOTE — PLAN OF CARE
Problem: Patient Care Overview  Goal: Plan of Care Review  Recommendations    Recommendation/Intervention: Continue current high protien, high calorie diet, with Boost Plus TID encouraging oral intake. Pt with very little intake x 2 weeks PTA. If oral diet does not resume to >50% of meals, recommend restarting TF. Recommend noctural TF to continue to encourage daily PO intake. RD following.     Goals: Meet >80% of EEN/EPN  Nutrition Goal Status: goal not met  Communication of RD Recs: reviewed with MD

## 2017-10-09 NOTE — ASSESSMENT & PLAN NOTE
Gentle fluids given over the last 24 hours with no improvement in sodium but rather new evidence of lower extremity edema.    Recommendations:  - Urine sodium/osmolarity as well as blood osmolarity added to tomorrows AM labs  - Will reassess sodium first thing in the morning and provide further recs

## 2017-10-09 NOTE — PROGRESS NOTES
"[facility] Hospital Medicine  Progress Note    Patient Name: Jhonny Diana  MRN: [id number]  Patient Class: IP- Inpatient   Admission Date: 9/27/2017  Length of Stay: 12 days  Attending Physician: Mi Braun MD  Primary Care Provider: Provider [facility] Medicine Team: [facility] HOSP MED S Mi Braun MD    Subjective:     Principal Problem:Alcoholic hepatitis with ascites    HPI:  26 yo male with hx of childhood asthma and heavy alcohol abuse (fifth of liquor daily since teenager, last drink 2.5 wks ago per mother) who was transferred to [facility] for higher level of care/liver transplant evaluation. Patient initially presented to OSH for a 2 mo hx of progressively worsening abdominal pain with associated fatigue, jaundice, and abdominal distention with ~20# weight gain. HPI given by patient's mother and OSH records as patient's participation in interview limited by previous administration of pain medication at OSH.      Per mother, patient did not have a fever, chills, dark stools, known sick contacts, nausea, vomiting, diarrhea, confusion, or HA. Reports family hx of alcoholic cirrhosis in father. Pt was recently diagnosed with alcoholic hepatitis during his admission at OSH on 9/6-9/15. During that hospitalization, patient was given ceftriaxone for SBP ppx, prednisone for alcoholic hepatitis, and diuresed. Pt was discharged and instructed to follow up with PCP and AA within 1 wk. After his discharge, patient returned to ED 4 days after for increased weight gain and worsening abdominal pain/jaundice. At OSH, patient was given fluids (for suspected pancreatitis), diuresed and given IV albumin. Two days prior to transfer to [facility], patient had an episode of hematemesis and underwent an EGD on 9/25 that revealed "1 sm varix in distal esophagus, no stigmata of bleeding, gastropathy of portal hypertension, and reflux esophagitis".  No further episodes of hematemesis noted since initial " episode.      Recent labs at OSH: Na 134, CO2 18, BUN 36, Cr 2, Tprot 5.9, albumin 1.9, Tbili 24, alk phos 83, , ALT51. Patient also had lipase 532 and ammonia 118 during his previous hospitalization at OSH. Labwork also notable for negative mitochondrial ab, sm muscle ab, and ceruloplasmin 22. Imaging at OSH revealing abdo U/S (9/20): hepatosplenomegaly, CT abdo w contrast: hepatomegaly with marked fatty infiltration of the liver and mild ascites (9/6). No CDs available for review.    Hospital Course:  Mr. Diana was admitted to the CMICU on 9/27 for decompensated alcoholic hepatitis with high MELD score, severe HE, and LAURA. Hepatology consulted and following to evaluate liver transplant candidacy. Patient not currently a transplant candidate due to recent alcohol use; recommending re-evaluation by addiction psych and social work when more medically stable and able to participate. Pt started on albumin and lasix for LAURA and possibility for HRS. However nephrology felt that LAURA is likely multifactorial: ATN given hypotensive episodes at OSH and toxic given severe hyperbilirubinemia; not truly HRS. Lactulose/rifaximin started for HE and mental status improving slowly. Mr. Diana has had significant ABD pain since admission with ABD distension. CT abdomen was significant for pericolonic fat stranding of right colon; no obstruction. Also with diffuse mesenteric edema. No significant ascites noted, no cholecystitis/cholelithiasis/ductal dilation. US liver with doppler showed appropriate vasculature.  He was stepped down to Hospital Medicine on 10/5.  Paracentesis was done on 10/9 with 3.3L removed.  His diet was advanced and Dietary was consulted to help get adequate caloric intake.    Interval History: No events overnight.  Sodium level continues to drop after IVF.  Paracentesis today.    Review of Systems   Constitutional: Negative for chills, fatigue and fever.   HENT: Negative for sore throat and trouble  swallowing.    Eyes: Negative for photophobia and visual disturbance.   Respiratory: Negative for cough and shortness of breath.    Cardiovascular: Negative for chest pain, palpitations and leg swelling.   Gastrointestinal: Positive for abdominal distention. Negative for abdominal pain, constipation, diarrhea, nausea and vomiting.   Endocrine: Negative for cold intolerance and heat intolerance.   Genitourinary: Negative for dysuria and frequency.   Musculoskeletal: Negative for arthralgias and myalgias.   Skin: Positive for color change. Negative for rash and wound.   Neurological: Negative for dizziness, syncope, weakness and light-headedness.   Psychiatric/Behavioral: Positive for confusion. Negative for hallucinations.   All other systems reviewed and are negative.    Objective:     Vital Signs (Most Recent):  Temp: 98.3 °F (36.8 °C) (10/09/17 0719)  Pulse: 106 (10/09/17 1050)  Resp: 20 (10/09/17 1050)  BP: 118/68 (10/09/17 1050)  SpO2: 98 % (10/09/17 1050) Vital Signs (24h Range):  Temp:  [98.3 °F (36.8 °C)-99.7 °F (37.6 °C)] 98.3 °F (36.8 °C)  Pulse:  [102-112] 106  Resp:  [18-21] 20  SpO2:  [97 %-99 %] 98 %  BP: (118-138)/(61-77) 118/68     Weight: 83.6 kg (184 lb 4.9 oz)  Body mass index is 28.87 kg/m².    Intake/Output Summary (Last 24 hours) at 10/09/17 1122  Last data filed at 10/09/17 1050   Gross per 24 hour   Intake          1655.83 ml   Output             3650 ml   Net         -1994.17 ml      Physical Exam   Constitutional: He appears well-developed and well-nourished. He is cooperative.  Non-toxic appearance. No distress.   HENT:   Head: Normocephalic and atraumatic.   Mouth/Throat: Oropharynx is clear and moist.   Eyes: EOM are normal. Pupils are equal, round, and reactive to light. Scleral icterus is present.   Neck: Normal range of motion. Neck supple.   Cardiovascular: Regular rhythm, normal heart sounds and normal pulses.  Tachycardia present.    No murmur heard.  Sinus tachycardia    Pulmonary/Chest: Effort normal. No respiratory distress. He has decreased breath sounds in the right lower field and the left lower field. He has no wheezes. He has no rales.   Abdominal: Soft. Normal appearance. He exhibits distension. He exhibits no mass. Bowel sounds are decreased. There is no tenderness. There is no rebound and no guarding. No hernia.   Musculoskeletal: Normal range of motion. He exhibits no edema.   Lymphadenopathy:     He has no cervical adenopathy.   Neurological: He is alert. He is not disoriented.   Oriented to person and place today   Skin: Skin is warm and dry. He is not diaphoretic.   Old blister/ wart on right plantar surface of the foot near the big toe   Psychiatric: He has a normal mood and affect. His behavior is normal. His affect is not labile. His speech is delayed. Cognition and memory are not impaired.   Nursing note and vitals reviewed.      Significant Labs:   CBC:     Recent Labs  Lab 10/08/17  0659 10/09/17  0436   WBC 26.69* 26.70*   HGB 9.6* 8.9*   HCT 27.9* 25.3*   * 143*     CMP:     Recent Labs  Lab 10/08/17  0659 10/09/17  0436   * 129*   K 4.7 4.7    105   CO2 14* 13*   * 120*   BUN 47* 49*   CREATININE 1.5* 1.3   CALCIUM 8.5* 8.4*   PROT 6.0 5.8*   ALBUMIN 2.6* 2.3*   BILITOT 32.9* 30.6*   ALKPHOS 155* 148*   * 115*   ALT 59* 59*   ANIONGAP 12 11   EGFRNONAA >60.0 >60.0     Coagulation:     Recent Labs  Lab 10/09/17  0436   INR 1.5*     All pertinent labs within the past 24 hours have been reviewed.    Significant Imaging: CXR: I have reviewed all pertinent results/findings within the past 24 hours and my personal findings are:  NG in appropriate position    Assessment/Plan:      * Alcoholic hepatitis with ascites    · Appreciate Hepatology assistance  · Continue Lactulose and Rifaximin for HE  · Currently not transplant candidate per Psych and Hep.  Addiction Psych consulted  · IR para today  · Stressed the importance of high  calorie diet to prevent NGT insertion again - Boost TID  · Will discuss if the patient would benefit from Lasix or Spironolactone    MELD-Na score: 30 at 10/9/2017  4:36 AM  MELD score: 26 at 10/9/2017  4:36 AM  Calculated from:  Serum Creatinine: 1.3 mg/dL at 10/9/2017  4:36 AM  Serum Sodium: 129 mmol/L at 10/9/2017  4:36 AM  Total Bilirubin: 30.6 mg/dL at 10/9/2017  4:36 AM  INR(ratio): 1.5 at 10/9/2017  4:36 AM  Age: 27 years            Coagulopathy    · 2/2 liver disease  · No bleeding; Monitor          Decompensated hepatic cirrhosis with ascites and HE    See alcoholic hepatitis with ascites          History of hematemesis    · Pt had an episode of hematemesis at OSH and EGD that revealed 1 small varix, reflux esophagitis, and gastropathy of portal hypertension  · No evidence of bleeding currently and relatively stable hemgolobin  · Continue Pantoprazole BID        Hepatic encephalopathy    · Mentation improving  · Continue Lactulose and Rifaxmin          Alcoholism /alcohol abuse    · Reported about a fifth of whiskey daily since teenager  · Appreciate Psych and Hep assistance; currently high risk for liver transplant.  Addiction Psych reconsulted  · PETH 174 on admit, 9/27        Metabolic acidosis with normal anion gap and bicarbonate losses    · 2/2 diarrhea given lactulose.  Currently on 10g daily with improving BMs  · Still acidotic, will monitor  · Sodium Bicarb TID          Acute renal failure with tubular necrosis    · Appreciate Nephro's help  · Suspect LAURA multifactorial: ischemic ATN given hypotension and injury due to hyperbilirubinemia. Felt less likely to be HRS however have continued albumin with lasix  · Overall, LAURA improving and making adequate urine.  Not measuring amounts as he pulls off condom catheter and is in diapers - but mother reports frequent wet diapers          Leukocytosis    · WBC remains elevated - stable at 26  · Remains afebrile  · Repeat blood cultures NGTD  · UA  negative  · Resp cx ESBL Klebsiella.  ID following.  Discussed case with ID - as patient is satting well on RA and has no cough, ID wants to hold off on treatment at this time        Severe malnutrition    · SLP consulted and ok for dental soft diet  · Dietary consulted  · Stressed the importance of eating high protein high calorie to prevent NGT re-insertion with TF  · Calorie counts and Boost TID        Anasarca    · 2/2 liver disease and hypoalbuminemia          Hyponatremia    · Sodium 129 today that decreased after IVF            VTE Risk Mitigation         Ordered     heparin (porcine) injection 5,000 Units  Every 8 hours     Route:  Subcutaneous        10/03/17 1740     Place sequential compression device  Until discontinued      09/28/17 0238     Medium Risk of VTE  Once      09/27/17 2337          Dispo pending improvement in mentation and Hep recs      Mi Braun MD  Department of Hospital Medicine   [facility]

## 2017-10-09 NOTE — PT/OT/SLP PROGRESS
Physical Therapy  Treatment    Jhonny Diana   MRN: [id number]   Admitting Diagnosis: Alcoholic hepatitis with ascites    PT Received On: 10/09/17  PT Start Time: 1355     PT Stop Time: 1425    PT Total Time (min): 30 min     Cotx with OT    Billable Minutes:  Gait [id number]    Treatment Type: Treatment  PT/PTA: PT     PTA Visit Number: 0       General Precautions: Standard, fall, aspiration  Orthopedic Precautions: N/A   Braces: N/A    Do you have any cultural, spiritual, Christianity conflicts, given your current situation?: none reported     Subjective:  Communicated with pt and nurse prior to session.  Pt agreeable to PT and asked if he could shower and wear clothes    Pain/Comfort  Pain Rating 1: 0/10  Pain Rating Post-Intervention 1: 0/10    Objective:   Patient found with: telemetry, peripheral IV   Pt found supine in bed with mother present    Functional Mobility:  Bed Mobility:   Scooting/Bridging: Modified Independent  Supine to Sit: Minimum Assistance   Performed with HOB slightly elevated without side rails    Transfers:  Sit <> Stand Assistance: Stand By Assistance  Sit <> Stand Assistive Device: Rolling Walker  Toilet Transfer Technique:  (sit to stand)  Toilet Transfer Assistance: Stand By Assistance  Toilet Transfer Assistive Device: Rolling Walker   Sit to stand transfers performed from bed x3 trials    Gait:   Gait Distance: 68 feet  Assistance 1: Contact Guard Assistance  Gait Assistive Device: Rolling walker  Gait Pattern: reciprocal  Gait Deviation(s): decreased saskia, increased time in double stance, decreased velocity of limb motion, decreased step length   Pt was also able to ambulate within room from bed to bathroom with RW with CGA and from toilet to shower with HHA      Balance:   Static Sit: FAIR+: Able to take MINIMAL challenges from all directions  Dynamic Sit: FAIR+: Maintains balance through MINIMAL excursions of active trunk motion  Static Stand: FAIR+: Takes MINIMAL challenges from all  directions  Dynamic stand: FAIR: Needs CONTACT GUARD during gait     Therapeutic Activities and Exercises:  PT provided pt and mother education on:   -safety with ambulation and to have assistance for amb to bathroom using RW   -nurse to order RW for use within room     AM-PAC 6 CLICK MOBILITY  How much help from another person does this patient currently need?   1 = Unable, Total/Dependent Assistance  2 = A lot, Maximum/Moderate Assistance  3 = A little, Minimum/Contact Guard/Supervision  4 = None, Modified [place]/Independent    Turning over in bed (including adjusting bedclothes, sheets and blankets)?: 4  Sitting down on and standing up from a chair with arms (e.g., wheelchair, bedside commode, etc.): 3  Moving from lying on back to sitting on the side of the bed?: 3  Moving to and from a bed to a chair (including a wheelchair)?: 3  Need to walk in hospital room?: 3  Climbing 3-5 steps with a railing?: 3  Total Score: 19    AM-PAC Raw Score CMS G-Code Modifier Level of Impairment Assistance   6 % Total / Unable   7 - 9 CM 80 - 100% Maximal Assist   10 - 14 CL 60 - 80% Moderate Assist   15 - 19 CK 40 - 60% Moderate Assist   20 - 22 CJ 20 - 40% Minimal Assist   23 CI 1-20% SBA / CGA   24 CH 0% Independent/ Mod I     Patient left sitting in shower with mother and nurse present.    Assessment:  Jhonny Diana is a 27 y.o. male with a medical diagnosis of Alcoholic hepatitis with ascites and presents with generalized weakness, impaired balance, gait, and endurance requiring min assist for bed mobility and CGA for ambulation using RW and short distance without Ad. The patient has shown a significant improvement in functional mobility and ambulation since last PT session with ability to amb further distance and requiring less assistance for bed mobility and transfers. Pt will benefit from continued PT treatment to address remaining impairments and improve functional mobility and independence.       Rehab  identified problem list/impairments: Rehab identified problem list/impairments: weakness, impaired endurance, impaired self care skills, gait instability, impaired balance    Rehab potential is good.    Activity tolerance: Good    Discharge recommendations: Discharge Facility/Level Of Care Needs: rehabilitation facility     Barriers to discharge: Barriers to Discharge: Inaccessible home environment    Equipment recommendations: Equipment Needed After Discharge:  (TBD)     GOALS:    Physical Therapy Goals        Problem: Physical Therapy Goal    Goal Priority Disciplines Outcome Goal Variances Interventions   Physical Therapy Goal     PT/OT, PT Ongoing (interventions implemented as appropriate)     Description:  Goals to be met by: 10/16/2017    Patient will increase functional independence with mobility by performin. Supine to sit with MInimal Assistance Met 10/9/17   Upgrade: supine to sit with SBA  2. Sit to supine with MInimal Assistance  3. Sit to stand transfer with Minimal Assistance - met 10/5   Upgrade: sit to stand transfer with SBA using LRAD or no AD  4. Bed to chair transfer with Moderate Assistance using LRAD or no AD.  5. Gait  x 20 feet with Moderate Assistance using LRAD or no Ad. Met 10/9/17   Upgrade: gait x150 feet with SBA using LRAD or no AD  6. Ascend/descend 4 stair with bilateral Handrails Moderate Assistance using LRAD or no AD.  7. Stand for 8 minutes with Minimal Assistance using LRAD or no AD.  8. Lower extremity exercise program x20 reps per handout, with independence                        PLAN:    Patient to be seen 5 x/week  to address the above listed problems via gait training, therapeutic activities, therapeutic exercises, neuromuscular re-education  Plan of Care expires: 17  Plan of Care reviewed with: patient, mother         Anne CARDONA Rishi, PT  10/09/2017

## 2017-10-09 NOTE — H&P
Inpatient Radiology Pre-procedure Note    History of Present Illness:  Jhonny Diana is a 27 y.o. male who presents for ultrasound guided paracentesis.  Admission H&P reviewed.  Past Medical History:   Diagnosis Date    Hypertension      Past Surgical History:   Procedure Laterality Date    APPENDECTOMY         Review of Systems:   As documented in primary team H&P    Home Meds:   Prior to Admission medications    Not on File     Scheduled Meds:    famotidine  20 mg Per NG tube BID    heparin (porcine)  5,000 Units Subcutaneous Q8H    lactulose  10 g Per NG tube Daily    rifAXImin  550 mg Oral BID    sodium bicarbonate  650 mg Per NG tube BID     Continuous Infusions:    sodium chloride 0.9% 50 mL/hr at 10/08/17 1205     PRN Meds:ondansetron, pneumoc 13-arianne conj-dip cr(PF), potassium chloride 10%, potassium chloride 10%, potassium chloride 10%, potassium, sodium phosphates, potassium, sodium phosphates, potassium, sodium phosphates  Anticoagulants/Antiplatelets: Heparin    Allergies:   Review of patient's allergies indicates:   Allergen Reactions    Bactrim [sulfamethoxazole-trimethoprim] Other (See Comments)     Mookie Trell Syndrome     Sedation Hx: have not been any systemic reactions    Labs:    Recent Labs  Lab 10/09/17  0436   INR 1.5*       Recent Labs  Lab 10/09/17  0436   WBC 26.70*   HGB 8.9*   HCT 25.3*   *   *      Recent Labs  Lab 10/09/17  0436   *   *   K 4.7      CO2 13*   BUN 49*   CREATININE 1.3   CALCIUM 8.4*   MG 2.1   ALT 59*   *   ALBUMIN 2.3*   BILITOT 30.6*   BILIDIR >14.0*         Vitals:  Temp: 98.3 °F (36.8 °C) (10/09/17 0719)  Pulse: 110 (10/09/17 1008)  Resp: 20 (10/09/17 1008)  BP: 131/77 (10/09/17 1008)  SpO2: 99 % (10/09/17 1008)     Physical Exam:  ASA: 2  Mallampati: n/a    General: no acute distress  Mental Status: alert and oriented to person, place and time  HEENT: normocephalic, atraumatic  Chest: unlabored breathing  Heart:  regular heart rate  Abdomen: distended  Extremity: moves all extremities    Plan: ultrasound guided paracentesis  Sedation Plan: local    LANE Miles, REDP  Interventional Radiology  [phone] [employer]

## 2017-10-09 NOTE — PT/OT/SLP PROGRESS
"Speech Language Pathology  Treatment    Jhonny Diana   MRN: [id number]   8087/8087 A    Admitting Diagnosis: Alcoholic hepatitis with ascites    Diet recommendations: Solid Diet Level: Regular  Liquid Diet Level: Thin   · Fully upright position for PO intake  · Small bites/ sips  · Slow rate of eating/ drinking  · Refrain from talking prior to swallow completion     SLP Treatment Date: 10/09/17  Speech Start Time: 1524     Speech Stop Time: 1540     Speech Total (min): 16 min       TREATMENT BILLABLE MINUTES:  Treatment Swallowing Dysfunction 16    Has the patient been evaluated by SLP for swallowing? : Yes  Keep patient NPO?: No   General Precautions: Standard, fall, aspiration  Current Respiratory Status:  (room air)       Subjective:  "I'm not going to eat a whole ham sandwich."    Pain/Comfort  Pain Rating 1: 0/10  Pain Rating Post-Intervention 1: 0/10    Objective:   Pt awake and alert upon entry with mom at b/s. HOB raised. Pt reporting dec'd appetite, however inc'd compared to over the weekend. Per pt and his mom, pt received regular consistency trays over the weekend, despite observation of dental soft diet order placed in Epic and recommended by SLP. Pt observed with multiple straw sips water in isolation and as liquid wash, bites efren cracker x~3, and bites ham sandwich x~4. Oral phase appears WFL. Extremely delayed coughing observed following bite efren cracker, unable to determine if related to PO intake or spontaneous. Pt and mom educated re: universal aspiration precautions listed above, as well as updated SLP POC, recommending regular consistency diet and thin liquids. White board updated. No further questions. Results discussed with NSG.     Assessment:  Jhonny Diana is a 27 y.o. male with a medical diagnosis of Alcoholic hepatitis with ascites and presents with risk of aspiration.     Discharge recommendations: Discharge Facility/Level Of Care Needs: rehabilitation facility     Goals:    SLP Goals  "       Problem: SLP Goal    Goal Priority Disciplines Outcome   SLP Goal     SLP Ongoing (interventions implemented as appropriate)   Description:  Speech Language Pathology Goals  Goals expected to be met by 10/15    1. Pt will tolerate dental soft diet/thin liquids without overt s/s of aspiration  2. Pt will tolerate regular trials without overt s/s of aspiration                     Plan:   Patient to be seen Therapy Frequency: 5 x/week   Plan of Care expires: 11/07/17  Plan of Care reviewed with: patient, mother  SLP Follow-up?: Yes            SRIKANTH Zaragoza, CCC-SLP  [phone]  10/9/2017

## 2017-10-09 NOTE — PROGRESS NOTES
Paracentesis complete. 3300 mLs peritoneal fluid drained. Pt tolerated well. Dressing to clean, dry, and intact. Report called to primary nurse.

## 2017-10-09 NOTE — PROGRESS NOTES
"[facility]  Hepatology  Progress Note    Patient Name: Jhonny Diana  MRN: [id number]  Admission Date: 9/27/2017  Hospital Length of Stay: 12 days  Attending Provider: Mi Braun MD   Primary Care Physician: Provider Notinsystem  Principal Problem:Alcoholic hepatitis with ascites    Subjective:     Transplant status: No    HPI: 26 yo male with hx of childhood asthma and heavy alcohol abuse (fifth of liquor daily since teenager, last drink 2.5 wks ago per mother) who was transferred to [facility] for higher level of care/liver transplant evaluation.    History obtained per chart review and discussion with mother: Patient initially presented to OSH for a 2 mo hx of progressively worsening abdominal pain with associated fatigue, jaundice, and abdominal distention with ~20# weight gain.     The patient's mother states he began having abdominal pain about 6 months ago.  States he underwent many tests, all of which were inconclusive.  He then began to have abdominal swelling about two months ago and was in&out of the local ER.  He was still actively drinking up until about 2.5 weeks ago according to the mother but she is unsure of this.  At the OSH the patient underwent an EGD which showed g1EV.  He was also diagnosed with "pancreatitis" based on an elevated lipase.  The patient was also placed on prednisone given concerns for alcoholic hepatitis.           Interval History:   No acute events overnight.  Able to obtain paracentesis, work with Pt, and speak to psychiatry today.    Current Facility-Administered Medications   Medication    heparin (porcine) injection 5,000 Units    lactulose 20 gram/30 mL solution Soln 10 g    ondansetron injection 4 mg    pantoprazole EC tablet 40 mg    pneumoc 13-arianne conj-dip cr(PF) 0.5 mL    potassium chloride 10% solution 40 mEq    potassium chloride 10% solution 40 mEq    potassium chloride 10% solution 60 mEq    potassium, sodium phosphates 280-160-250 mg packet " 2 packet    potassium, sodium phosphates 280-160-250 mg packet 2 packet    potassium, sodium phosphates 280-160-250 mg packet 2 packet    rifAXIMin tablet 550 mg    sodium bicarbonate tablet 1,300 mg       Objective:     Vital Signs (Most Recent):  Temp: 98.9 °F (37.2 °C) (10/09/17 1559)  Pulse: 108 (10/09/17 1559)  Resp: 20 (10/09/17 1559)  BP: 133/63 (10/09/17 1559)  SpO2: 98 % (10/09/17 1559) Vital Signs (24h Range):  Temp:  [98.3 °F (36.8 °C)-99.2 °F (37.3 °C)] 98.9 °F (37.2 °C)  Pulse:  [102-112] 108  Resp:  [18-21] 20  SpO2:  [98 %-99 %] 98 %  BP: (118-135)/(61-77) 133/63     Weight: 83.6 kg (184 lb 4.9 oz) (10/06/17 0400)  Body mass index is 28.87 kg/m².    Physical Exam   Constitutional: No distress.   HENT:   Head: Normocephalic.   Eyes: Pupils are equal, round, and reactive to light. Scleral icterus is present.   Neck: Normal range of motion.   Cardiovascular:   Sinus tachycardia     Pulmonary/Chest: Effort normal and breath sounds normal. No respiratory distress. He has no wheezes. He has no rales. He exhibits no tenderness.   Abdominal:   Abdomen less distended when compared to yesterday   Musculoskeletal: Normal range of motion. He exhibits edema.   Neurological:   Drowsy unable to elicit orientation   Skin: He is not diaphoretic.       MELD-Na score: 30 at 10/9/2017  4:36 AM  MELD score: 26 at 10/9/2017  4:36 AM  Calculated from:  Serum Creatinine: 1.3 mg/dL at 10/9/2017  4:36 AM  Serum Sodium: 129 mmol/L at 10/9/2017  4:36 AM  Total Bilirubin: 30.6 mg/dL at 10/9/2017  4:36 AM  INR(ratio): 1.5 at 10/9/2017  4:36 AM  Age: 27 years    Significant Labs:  CBC:   Recent Labs  Lab 10/09/17  0436   WBC 26.70*   RBC 2.35*   HGB 8.9*   HCT 25.3*   *     CMP:   Recent Labs  Lab 10/09/17  0436   *   CALCIUM 8.4*   ALBUMIN 2.3*   PROT 5.8*   *   K 4.7   CO2 13*      BUN 49*   CREATININE 1.3   ALKPHOS 148*   ALT 59*   *   BILITOT 30.6*     Coagulation:   Recent Labs  Lab  10/09/17  0436   INR 1.5*       Significant Imaging:  X-ray reviewed    Assessment/Plan:     Decompensated hepatic cirrhosis with ascites and HE    27 year old male with a history of alcohol abuse transferred from [facility] for higher level of care.On admission patient came in with severe AH and was pretty obtunded. He has made improvements in his mentation yet remains a bit confused which has limited the liver transplant eval.    Recommendations:  - Continue liquid diet (or advance if patient can tolerate it) would like patient to meet the following: calorie 30-40kcal/day and protein 1.5 gram/kg/day (if unable will require NGT)  - Continue Lactulose (can be titrated to ~ 3-5 BM per day) and Rifaximin  - Continue daily PT/OT        Hyponatremia    Gentle fluids given over the last 24 hours with no improvement in sodium but rather new evidence of lower extremity edema.    Recommendations:  - Urine sodium/osmolarity as well as blood osmolarity added to tomorrows AM labs  - Will reassess sodium first thing in the morning and provide further recs            Thank you for your consult. I will follow-up with patient. Please contact us if you have any additional questions.    Divya Booker M.D.  Gastroenterology Fellow, PGY-IV  Pager: [phone]  [facility]

## 2017-10-09 NOTE — CONSULTS
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 within the spleen.    The kidneys measure 12.3 and 11.9 cm on the right and left respectively.  No obvious mass or hydronephrosis.  The aorta is obscured by midline bowel gas.    Trace ascites.    Color Doppler images demonstrate appropriate antegrade flow within the hepatic veins, main portal vein, portal venous branches, and IVC.  No portal vein thrombus.  The main hepatic artery is patent without tardus parvus waveform.  Recanalized umbilical vein and mild splenorenal collateral vessels.                            Monisha Ortiz MD  [facility] PGY2  [phone]      Patient seen and examined with resident agree with assessment and plan as documented above.     -ABU program information given to mother  -Prognosis guarded concerning relapse for alcohol use without previous SA treatment, but from addiction psychiatry standpoint can proceed with transplant if needed urgently and patient will complete ABU program s/p transplant.     Edmar Win MD  [facility]

## 2017-10-09 NOTE — PLAN OF CARE
Problem: Patient Care Overview  Goal: Plan of Care Review  Outcome: Ongoing (interventions implemented as appropriate)  Pt awake and alert. Pt oriented to person and situation. Pt disoriented to time and place. Pt's skin and sclera jaundice. Pt sinus tach on tele. Pt went for therapeutic paracentesis today. 3.3 L was taken off the right side of abdomen. Pt's abdomen still distended. Gauze dressing with tagaderm to site in place with small amount of drainage. Pt had 1 BM today. Pt had 3 unmeasured urine occurrences thus far this shift. Pt's mother at bedside through out day. Pt's IV fluids discounted. Pt worked with OT and PT today. Walker ordered for room. Pt able to take shower. Pt placed on contact isolation for K pneumoniae ESBL. Pt also had Chest xray and VBG completed today. Pt wearing nonslip socks when out of bed. Pt need 1-2 person assist when ambulating or getting out of bed.     Problem: Fall Risk (Adult)  Goal: Absence of Falls  Patient will demonstrate the desired outcomes by discharge/transition of care.   Outcome: Ongoing (interventions implemented as appropriate)  Pt wearing nonslip socks when out of bed. Pt's mother at bedside at all times. Pt and pt's mom to call for help when getting out of bed.     Problem: Pressure Ulcer Risk (Syed Scale) (Adult,Obstetrics,Pediatric)  Goal: Skin Integrity  Patient will demonstrate the desired outcomes by discharge/transition of care.   Outcome: Ongoing (interventions implemented as appropriate)  Pt encouraged to move around in the bed and has been getting up to go to the bathroom and ambulate.

## 2017-10-09 NOTE — PLAN OF CARE
Problem: Patient Care Overview  Goal: Plan of Care Review  Outcome: Ongoing (interventions implemented as appropriate)    Pt awake and alert. Disoriented to time. Mentation much improved.    Pt mother, Sanjuanita is at the bedside.  Pt mother is attentive to and supportive of pt and actively involved in pt care.    VSS    NS@50 mL/hr    High protein/high calorie diet with boost   Calorie count    Pt up to bedside commode with 2 person assist.    L side of bottom lip with episode of bleeding overnight.  Pt abdomen is very distended.  Plan for pt to have therapeutic paracentesis this AM.    Pt remained free from falls or injury thus far.   Bed is in low/ locked position, side rails are up x 2, call light is in reach.   Will continue to monitor.

## 2017-10-09 NOTE — ASSESSMENT & PLAN NOTE
· Appreciate Hepatology assistance  · Continue Lactulose and Rifaximin for HE  · Currently not transplant candidate per Psych and Hep.  Addiction Psych consulted  · IR para today  · Stressed the importance of high calorie diet to prevent NGT insertion again - Boost TID  · Will discuss if the patient would benefit from Lasix or Spironolactone    MELD-Na score: 30 at 10/9/2017  4:36 AM  MELD score: 26 at 10/9/2017  4:36 AM  Calculated from:  Serum Creatinine: 1.3 mg/dL at 10/9/2017  4:36 AM  Serum Sodium: 129 mmol/L at 10/9/2017  4:36 AM  Total Bilirubin: 30.6 mg/dL at 10/9/2017  4:36 AM  INR(ratio): 1.5 at 10/9/2017  4:36 AM  Age: 27 years

## 2017-10-09 NOTE — PLAN OF CARE
Problem: Occupational Therapy Goal  Goal: Occupational Therapy Goal  Goals to be met by: 10/17/2017    Patient will increase functional independence with ADLs by performing:    UE Dressing with Supervision.  LE Dressing with Supervision.  Grooming while seated with Supervision.  Toileting from bedside commode with Supervision for hygiene and clothing management.   Sitting at edge of bed or bedside chair  x30 minutes with Modified [place].  Rolling to Bilateral with Supervision.   Supine to sit with Supervision.  Stand pivot transfers with Supervision.  Toilet transfer to bedside commode with Supervision.     Goals revised.    JULIETTE Kirby  10/9/2017

## 2017-10-09 NOTE — PROCEDURES
Radiology Post-Procedure Note    Pre Op Diagnosis: Ascites  Post Op Diagnosis: Same    Procedure: Ultrasound Guided Paracentesis    Procedure performed by: Jenn JOLLEY, Jacquelyn     Written Informed Consent Obtained: Yes  Specimen Removed: YES dark yellow  Estimated Blood Loss: Minimal    Findings:   Successful paracentesis.  Albumin administered PRN per protocol.    Patient tolerated procedure well.    Jacquelyn Barajas, APRN, FNP  Interventional Radiology  [phone] spectralink

## 2017-10-09 NOTE — PLAN OF CARE
Problem: SLP Goal  Goal: SLP Goal  Speech Language Pathology Goals  Goals expected to be met by 10/15    1. Pt will tolerate dental soft diet/thin liquids without overt s/s of aspiration  2. Pt will tolerate regular trials without overt s/s of aspiration   Outcome: Ongoing (interventions implemented as appropriate)  Recommend advance diet to regular consistencies while remaining on thin liquids.     SRIKANTH Zaragoza, CCC-SLP  [phone]  10/9/2017

## 2017-10-09 NOTE — ASSESSMENT & PLAN NOTE
Nutrition Problem  Inadequate energy intake    Related to (etiology):   TF d/c with poor oral intake    Signs and Symptoms (as evidenced by):  Meeting 25% EEN and EPN    Interventions/Recommendations (treatment strategy):  See recs    Nutrition Diagnosis Status:   New

## 2017-10-09 NOTE — PROVIDER TRANSFER
"HOSPITAL MEDICINE  Provider Transfer Note      Team: [facility] MED [facility]    Patient Name: Jhonny Diana   MRN:  [id number]  Age: 27 y.o.  YOB: 1990  Sex:  male    Admit Date: 9/27/2017                   LOS: 12  Code Status:  FULL      Chief Complaint/Reason for Admission: Alcoholic hepatitis with ascites      History of Present Illness:  28 yo male with hx of childhood asthma and heavy alcohol abuse (fifth of liquor daily since teenager, last drink 2.5 wks ago per mother) who was transferred to [facility] for higher level of care/liver transplant evaluation. Patient initially presented to OSH for a 2 mo hx of progressively worsening abdominal pain with associated fatigue, jaundice, and abdominal distention with ~20# weight gain. HPI given by patient's mother and OSH records as patient's participation in interview limited by previous administration of pain medication at OSH.      Per mother, patient did not have a fever, chills, dark stools, known sick contacts, nausea, vomiting, diarrhea, confusion, or HA. Reports family hx of alcoholic cirrhosis in father. Pt was recently diagnosed with alcoholic hepatitis during his admission at OSH on 9/6-9/15. During that hospitalization, patient was given ceftriaxone for SBP ppx, prednisone for alcoholic hepatitis, and diuresed. Pt was discharged and instructed to follow up with PCP and AA within 1 wk. After his discharge, patient returned to ED 4 days after for increased weight gain and worsening abdominal pain/jaundice. At OSH, patient was given fluids (for suspected pancreatitis), diuresed and given IV albumin. Two days prior to transfer to [facility], patient had an episode of hematemesis and underwent an EGD on 9/25 that revealed "1 sm varix in distal esophagus, no stigmata of bleeding, gastropathy of portal hypertension, and reflux esophagitis".  No further episodes of hematemesis noted since initial episode.       Hospital Course:  Mr. Diana was admitted to the CMICU on 9/27 " for decompensated alcoholic hepatitis with high MELD score, severe HE, and LAURA. Hepatology consulted and following to evaluate liver transplant candidacy. At that time, patient was deemed as not a transplant candidate due to recent alcohol use; recommending re-evaluation by addiction psych and social work when more medically stable and able to participate. Pt started on albumin and lasix for LAURA and possibility for HRS. However nephrology felt that LAURA is likely multifactorial: ATN given hypotensive episodes at OSH and toxic given severe hyperbilirubinemia; not truly HRS. Lactulose/rifaximin started for HE and mental status improving slowly. He has had significant ABD pain since admission with ABD distension. CT abdomen was significant for pericolonic fat stranding of right colon; no obstruction with diffuse mesenteric edema. No significant ascites noted, no cholecystitis/cholelithiasis/ductal dilation. US liver with doppler showed appropriate vasculature.  He was stepped down to Hospital Medicine on 10/5.  Paracentesis was done on 10/9 with 3.3L removed.  His diet was advanced as his mentation improved and Dietary was consulted to help get adequate caloric intake. Psych was reconsulted to evaluate for transplant candidacy as his mentation improved. He continued to have leukocytosis with negative cx until 10/6 when grew ESBL Kleb on resp cx.  ID does not want to treat at this time as WBC is improving and he is satting well on room air without cough or respiratory symptoms.      Tasks to Follow Up:  -Hepatology  -Psych  -ID  -Monitor nutritional status. Will need NG replaced and TF restarted if he doesn't get adequate nutrition per Dietary/Hep      Dispo:  Pending Hep recs and liver transplant eval      Mi Braun MD  [facility]  Cell:  [phone]  Spectra:  [phone]  Pager:  [phone]

## 2017-10-09 NOTE — SUBJECTIVE & OBJECTIVE
Interval History:   No acute events overnight.  Able to obtain paracentesis, work with Pt, and speak to psychiatry today.    Current Facility-Administered Medications   Medication    heparin (porcine) injection 5,000 Units    lactulose 20 gram/30 mL solution Soln 10 g    ondansetron injection 4 mg    pantoprazole EC tablet 40 mg    pneumoc 13-arianne conj-dip cr(PF) 0.5 mL    potassium chloride 10% solution 40 mEq    potassium chloride 10% solution 40 mEq    potassium chloride 10% solution 60 mEq    potassium, sodium phosphates 280-160-250 mg packet 2 packet    potassium, sodium phosphates 280-160-250 mg packet 2 packet    potassium, sodium phosphates 280-160-250 mg packet 2 packet    rifAXIMin tablet 550 mg    sodium bicarbonate tablet 1,300 mg       Objective:     Vital Signs (Most Recent):  Temp: 98.9 °F (37.2 °C) (10/09/17 1559)  Pulse: 108 (10/09/17 1559)  Resp: 20 (10/09/17 1559)  BP: 133/63 (10/09/17 1559)  SpO2: 98 % (10/09/17 1559) Vital Signs (24h Range):  Temp:  [98.3 °F (36.8 °C)-99.2 °F (37.3 °C)] 98.9 °F (37.2 °C)  Pulse:  [102-112] 108  Resp:  [18-21] 20  SpO2:  [98 %-99 %] 98 %  BP: (118-135)/(61-77) 133/63     Weight: 83.6 kg (184 lb 4.9 oz) (10/06/17 0400)  Body mass index is 28.87 kg/m².    Physical Exam   Constitutional: No distress.   HENT:   Head: Normocephalic.   Eyes: Pupils are equal, round, and reactive to light. Scleral icterus is present.   Neck: Normal range of motion.   Cardiovascular:   Sinus tachycardia     Pulmonary/Chest: Effort normal and breath sounds normal. No respiratory distress. He has no wheezes. He has no rales. He exhibits no tenderness.   Abdominal:   Abdomen less distended when compared to yesterday   Musculoskeletal: Normal range of motion. He exhibits edema.   Neurological:   Drowsy unable to elicit orientation   Skin: He is not diaphoretic.       MELD-Na score: 30 at 10/9/2017  4:36 AM  MELD score: 26 at 10/9/2017  4:36 AM  Calculated from:  Serum Creatinine:  1.3 mg/dL at 10/9/2017  4:36 AM  Serum Sodium: 129 mmol/L at 10/9/2017  4:36 AM  Total Bilirubin: 30.6 mg/dL at 10/9/2017  4:36 AM  INR(ratio): 1.5 at 10/9/2017  4:36 AM  Age: 27 years    Significant Labs:  CBC:   Recent Labs  Lab 10/09/17  0436   WBC 26.70*   RBC 2.35*   HGB 8.9*   HCT 25.3*   *     CMP:   Recent Labs  Lab 10/09/17  0436   *   CALCIUM 8.4*   ALBUMIN 2.3*   PROT 5.8*   *   K 4.7   CO2 13*      BUN 49*   CREATININE 1.3   ALKPHOS 148*   ALT 59*   *   BILITOT 30.6*     Coagulation:   Recent Labs  Lab 10/09/17  0436   INR 1.5*       Significant Imaging:  X-ray reviewed

## 2017-10-09 NOTE — SIGNIFICANT EVENT
Critical Care Outreach (CORE)  Critical Care Medicine  Consult Note      CORE Metrics:     Admit Date: 2017  LOS: 12  Code Status: Full Code   CC: Epic Alert  Date of Consult: 10/09/2017  : 1990  Age: 27 y.o.  Weight:   Wt Readings from Last 1 Encounters:   10/06/17 83.6 kg (184 lb 4.9 oz)     Race:   Sex: male  Bed: 80/80 A:   MRN: [id number]  RRT Indication(s): Epic Alert  Was the patient discharged from an ICU this admission? yes   Was the patient discharged from a PACU within last 24 hours? no  Did the patient receive conscious sedation/general anesthesia within last 24 hours? no  Was the patient in the ED within the past 24 hours? no  Was the patient started on NIPPV within the past 24 hours? no  Did this progress into an ARC or CPA: no  Attending Physician: Mi Braun MD  Primary Service: [facility] MED S  Illness Category: Medical  (Medical Cardiac, Neurologic, Surgical Cardiac, Medical Non-Cardiac, Surgical Non-Cardiac, [place], Obstetric, Ambulatory/Outpatient, Trauma, Other i.e. Visitor/Employee)  Consult Requested By: Mi Braun MD   Disposition: Remain in 80    SUBJECTIVE:     HPI:  Mr. Diana is a 26 y/o male with hx of childhood asthma and heavy alcohol abuse (fifth of liquor daily since teenager, last drink 2.5 wks ago per mother) who was transferred to [facility] for higher level of care/liver transplant evaluation. Patient initially presented to OSH for a 2 mo hx of progressively worsening abdominal pain with associated fatigue, jaundice, and abdominal distention with ~20# weight gain. HPI given by patient's mother and OSH records as patient's participation in interview limited by previous administration of pain medication at OSH.     Per mother, patient did not have a fever, chills, dark stools, known sick contacts, nausea, vomiting, diarrhea, confusion, or HA. Reports family hx of alcoholic cirrhosis in father. Pt was recently diagnosed with alcoholic hepatitis  "during his admission at OSH on 9/6-9/15. During that hospitalization, patient was given ceftriaxone for SBP ppx, prednisone for alcoholic hepatitis, and diuresed. Pt was discharged and instructed to follow up with PCP and AA within 1 wk. After his discharge, patient returned to ED 4 days after for increased weight gain and worsening abdominal pain/jaundice. At OSH, patient was given fluids (for suspected pancreatitis), diuresed and given IV albumin. Two days prior to transfer to [facility] [place] [facility], patient had an episode of hematemesis and underwent an EGD on 9/25 that revealed "1 sm varix in distal esophagus, no stigmata of bleeding, gastropathy of portal hypertension, and reflux esophagitis".  No further episodes of hematemesis noted since initial episode.       Recent labs at OSH: Na 134, CO2 18, BUN 36, Cr 2, Tprot 5.9, albumin 1.9, Tbili 24, alk phos 83, , ALT51. Patient also had lipase 532 and ammonia 118 during his previous hospitalization at OSH. Labwork also notable for negative mitochondrial ab, sm muscle ab, and ceruloplasmin 22. Imaging at OSH revealing abdo U/S (9/20): hepatosplenomegaly, CT abdo w contrast: hepatomegaly with marked fatty infiltration of the liver and mild ascites (9/6). No CDs available for review.          Hospital/ICU Course:  Mr. Diana was admitted for decompensated alcoholic hepatitis with high MELD score, severe HE, and LAURA. Hepatology consulted and following to evaluate liver transplant candidacy. Patient not currently a transplant candidate due to recent alcohol use; recommending re-evaluation by addiction psych and social work when more medically stable and able to participate. Pt started on albumin and lasix for LAURA and possibility for HRS. However nephrology felt that LAURA is likely multifactorial: ATN given hypotensive episodes at OSH and toxic given severe hyperbilirubinemia; not truly HRS. Lactulose/rifaximin started for HE and mental status improving slowly. Mr. Diana has had " significant ABD pain since admission with ABD distension. CT abdomen was significant for pericolonic fat stranding of right colon; no obstruction. Also with diffuse mesenteric edema. No significant ascites noted, no cholecystitis/cholelithiasis/ductal dilation. US liver with doppler showed appropriate vasculature.      He was stepdown to hospital medicine on 10/5/17.       Epic alert this morning. On arrival to room, patient awake, alert, oriented x3.  Not in any acute distress.  Patient's mother and Psychiatry at bedside.   on monitor.  Recent vital signs: T 98.3, , RR 21, /69, Spo2 98% on room air.     Past Medical History:   Diagnosis Date    Hypertension       Past Surgical History:   Procedure Laterality Date    APPENDECTOMY        Review of patient's allergies indicates:   Allergen Reactions    Bactrim [sulfamethoxazole-trimethoprim] Other (See Comments)     Mookie Trell Syndrome       History reviewed. No pertinent family history.   Social History   Substance Use Topics    Smoking status: Former Smoker     Types: Cigarettes    Smokeless tobacco: Never Used    Alcohol use Yes      Comment: a fifth of liquor daily for years, cut back over last 2 months         Inpatient Medications:  Continuous Infusions:   sodium chloride 0.9% 50 mL/hr at 10/08/17 1205      Scheduled Meds:   famotidine  20 mg Per NG tube BID    heparin (porcine)  5,000 Units Subcutaneous Q8H    lactulose  10 g Per NG tube Daily    rifAXImin  550 mg Oral BID    sodium bicarbonate  650 mg Per NG tube BID      PRN Meds:.ondansetron, pneumoc 13-arianne conj-dip cr(PF), potassium chloride 10%, potassium chloride 10%, potassium chloride 10%, potassium, sodium phosphates, potassium, sodium phosphates, potassium, sodium phosphates     Review of Systems:  Denies chest pain, shortness of breath, abdominal pain.  + diarrhea.        OBJECTIVE:     Vital Signs (Most Recent):  Temp: 98.3 °F (36.8 °C) (10/09/17 0719)  Pulse: 110  (10/09/17 0719)  Resp: (!) 21 (10/09/17 0719)  BP: 134/69 (10/09/17 0719)  SpO2: 98 % (10/09/17 0409) Vital Signs (24h Range):  Temp:  [98.3 °F (36.8 °C)-99.7 °F (37.6 °C)] 98.3 °F (36.8 °C)  Pulse:  [102-111] 110  Resp:  [18-21] 21  SpO2:  [97 %-98 %] 98 %  BP: (124-138)/(61-75) 134/69     I & O (24h):    Intake/Output Summary (Last 24 hours) at 10/09/17 0846  Last data filed at 10/08/17 2359   Gross per 24 hour   Intake          1355.83 ml   Output                0 ml   Net          1355.83 ml        Physical Exam:  GA: Alert, comfortable.  HEENT:No visible oropharyngeal abnormalities. + scleral icterus. No visible thyromegally.   Pulmonary: Chest wall symmetric. Tachypnea, decreased breath sounds bilateral lower lung zones, worse on left.  No wheezing or rhonchi  Cardiac: ST S1 & S2 w/o rubs/murmurs/gallops. + lower extremity edema.   Abdominal: Bowel sounds present. Abdomen distended.  Nontender.   Skin: + jaundice  Musculoskeletal: Moves all extremities   Extremities: No clubbing or cyanosis.  Neuro:  --Mental Status:  Awake, alert, oriented x 3, moving all extremities.   --RASS: 0  --Pupils 4 mm, PERRL.    Lines/Drains/Airway:           Nutrition:  Current Diet Order   Procedures    Diet high protein - high calorie     Dental soft         Labs:  CBC/Anemia Profile:     Recent Labs  Lab 10/07/17  0403 10/08/17  0659 10/09/17  0436   WBC 28.25* 26.69* 26.70*   HGB 9.9* 9.6* 8.9*   HCT 28.4* 27.9* 25.3*   * 130* 143*   * 109* 108*   RDW 14.5 14.4 14.7*        Coags:     Recent Labs  Lab 10/07/17  0403 10/08/17  0659 10/09/17  0436   INR 1.4* 1.5* 1.5*        Chemistries:     Recent Labs  Lab 10/07/17  0403 10/08/17  0659 10/09/17  0436   * 131* 129*   K 4.4 4.7 4.7    105 105   CO2 13* 14* 13*   BUN 42* 47* 49*   CREATININE 1.4 1.5* 1.3   CALCIUM 8.1* 8.5* 8.4*   PROT 5.7* 6.0 5.8*   BILITOT 30.6* 32.9* 30.6*   ALKPHOS 170* 155* 148*   ALT 65* 59* 59*   * 106* 115*   MG 2.2 2.3  2.1   PHOS 3.0 2.7 2.9        Urine Studies:   Lab Results   Component Value Date    COLORU Martha 09/28/2017    APPEARANCEUA Hazy (A) 09/28/2017    PHUR 5.0 09/28/2017    SPECGRAV 1.015 09/28/2017    PROTEINUA Negative 09/28/2017    GLUCUA 1+ (A) 09/28/2017    KETONESU Negative 09/28/2017    BILIRUBINUA 2+ (A) 09/28/2017    OCCULTUA 1+ (A) 09/28/2017    NITRITE Negative 09/28/2017    UROBILINOGEN 4.0 09/28/2017    LEUKOCYTESUR Negative 09/28/2017    RBCUA 0 09/28/2017    WBCUA 3 09/28/2017    BACTERIA Few (A) 09/28/2017    SQUAMEPITHEL 1 09/28/2017        Lactic Acid:   Lab Results   Component Value Date    LACTATE 1.9 10/04/2017    LACTATE 2.3 (H) 10/03/2017    LACTATE 1.1 09/28/2017        Cardiac Enzymes:   No results for input(s): CPK, CPKMB, MB, TROPONINI in the last 72 hours.     Arterial Blood Gas:   No results for input(s): PH, PCO2, HCO3, POCSATURATED, BE in the last 24 hours.    Invalid input(s):  PO2     Microbiology Results (last 7 days)     Procedure Component Value Units Date/Time    Blood culture [[id number]] Collected:  10/03/17 1015    Order Status:  Completed Specimen:  Blood from Peripheral, Hand, Left Updated:  10/08/17 2012     Blood Culture, Routine No growth after 5 days.    Narrative:       Blood cultures x 2 different sites. 4 bottles total. Please  draw cultures before administering antibiotics.    Culture, Body Fluid - Bactec [[id number]] Collected:  10/05/17 1436    Order Status:  Completed Specimen:  Body Fluid from Ascites Updated:  10/08/17 1812     Body Fluid Culture, Sterile No Growth to date     Body Fluid Culture, Sterile No Growth to date     Body Fluid Culture, Sterile No Growth to date     Body Fluid Culture, Sterile No Growth to date    Blood culture [[id number]] Collected:  10/03/17 1035    Order Status:  Completed Specimen:  Blood from Peripheral, Hand, Right Updated:  10/08/17 1412     Blood Culture, Routine No growth after 5 days.    Narrative:       Blood cultures from 2  different sites. 4 bottles total.  Please draw before starting antibiotics.    Culture, Respiratory with Gram Stain [[id number]] Collected:  10/06/17 1921    Order Status:  Completed Specimen:  Respiratory from Sputum Updated:  10/08/17 1211     Respiratory Culture --     GRAM NEGATIVE LORNA, LACTOSE   Many  Identification and susceptibility pending       Gram Stain (Respiratory) <10 epithelial cells per low power field     Gram Stain (Respiratory) No WBC's     Gram Stain (Respiratory) Few Gram negative rods    Gram stain [[id number]] Collected:  10/05/17 1436    Order Status:  Completed Specimen:  Body Fluid from Ascites Updated:  10/05/17 1922     Gram Stain Result No WBC's      No organisms seen    Cryptococcal antigen [[id number]] Collected:  10/05/17 0239    Order Status:  Completed Specimen:  Blood from Blood Updated:  10/05/17 1158     Cryptococcal Ag, Blood Negative    Clostridium difficile EIA [[id number]] Collected:  10/03/17 1148    Order Status:  Completed Specimen:  Stool from Stool Updated:  10/03/17 2158     C. diff Antigen Negative     C difficile Toxins A+B, EIA Negative     Comment: Testing not recommended for children <24 months old.       Blood culture [[id number]] Collected:  09/28/17 0044    Order Status:  Completed Specimen:  Blood from Peripheral, Hand, Right Updated:  10/03/17 0622     Blood Culture, Routine No growth after 5 days.    Blood culture [[id number]] Collected:  09/28/17 0030    Order Status:  Completed Specimen:  Blood from Peripheral, Antecubital, Left Updated:  10/03/17 0622     Blood Culture, Routine No growth after 5 days.          Imaging:       Diagnostics/Interventions by CORE:         ASSESSMENT/PLAN:     Active Hospital Problems    Diagnosis    *Alcoholic hepatitis with ascites    Severe malnutrition    Leukocytosis    Coagulopathy    Metabolic acidosis with normal anion gap and bicarbonate losses    Alcoholism /alcohol abuse    Hepatic encephalopathy    History  of hematemesis    Hyponatremia    Anasarca    Decompensated hepatic cirrhosis with ascites and HE        Metabolic Acidosis  --appears to have a HAGMA and NAGMA component (corrected AG 16, Delta-Delta 0.4).  Suspect from uremia and bicarb wasting from diarrhea. (mother reports 4-6 bowel movements over last 24 hours).    --recommend chest xray, vbg, procalcitonin and repeat bmp this afternoon.   --monitor for respiratory fatigue.   --ID following.  Sputum with GNR, persistent leukocytosis, afebrile. Follow up chest xray and consider antibiotics to cover for possible PNA.  Currently on room air.  No hypotension.   SR.       Spoke with Dr. Pearce, Primary Team.  No indication to transfer to the ICU.  Please call for any questions, concerns, or changes in clinical condition.     Discussed with Critical Care Fellow, Dr. Rosario.     CHARLINE SHERMAN, [facility]  Critical Care Medicine  [phone]

## 2017-10-09 NOTE — SUBJECTIVE & OBJECTIVE
Interval History: No events overnight.  Sodium level continues to drop after IVF.  Paracentesis today.    Review of Systems   Constitutional: Negative for chills, fatigue and fever.   HENT: Negative for sore throat and trouble swallowing.    Eyes: Negative for photophobia and visual disturbance.   Respiratory: Negative for cough and shortness of breath.    Cardiovascular: Negative for chest pain, palpitations and leg swelling.   Gastrointestinal: Positive for abdominal distention. Negative for abdominal pain, constipation, diarrhea, nausea and vomiting.   Endocrine: Negative for cold intolerance and heat intolerance.   Genitourinary: Negative for dysuria and frequency.   Musculoskeletal: Negative for arthralgias and myalgias.   Skin: Positive for color change. Negative for rash and wound.   Neurological: Negative for dizziness, syncope, weakness and light-headedness.   Psychiatric/Behavioral: Positive for confusion. Negative for hallucinations.   All other systems reviewed and are negative.    Objective:     Vital Signs (Most Recent):  Temp: 98.3 °F (36.8 °C) (10/09/17 0719)  Pulse: 106 (10/09/17 1050)  Resp: 20 (10/09/17 1050)  BP: 118/68 (10/09/17 1050)  SpO2: 98 % (10/09/17 1050) Vital Signs (24h Range):  Temp:  [98.3 °F (36.8 °C)-99.7 °F (37.6 °C)] 98.3 °F (36.8 °C)  Pulse:  [102-112] 106  Resp:  [18-21] 20  SpO2:  [97 %-99 %] 98 %  BP: (118-138)/(61-77) 118/68     Weight: 83.6 kg (184 lb 4.9 oz)  Body mass index is 28.87 kg/m².    Intake/Output Summary (Last 24 hours) at 10/09/17 1122  Last data filed at 10/09/17 1050   Gross per 24 hour   Intake          1655.83 ml   Output             3650 ml   Net         -1994.17 ml      Physical Exam   Constitutional: He appears well-developed and well-nourished. He is cooperative.  Non-toxic appearance. No distress.   HENT:   Head: Normocephalic and atraumatic.   Mouth/Throat: Oropharynx is clear and moist.   Eyes: EOM are normal. Pupils are equal, round, and reactive to  light. Scleral icterus is present.   Neck: Normal range of motion. Neck supple.   Cardiovascular: Regular rhythm, normal heart sounds and normal pulses.  Tachycardia present.    No murmur heard.  Sinus tachycardia   Pulmonary/Chest: Effort normal. No respiratory distress. He has decreased breath sounds in the right lower field and the left lower field. He has no wheezes. He has no rales.   Abdominal: Soft. Normal appearance. He exhibits distension. He exhibits no mass. Bowel sounds are decreased. There is no tenderness. There is no rebound and no guarding. No hernia.   Musculoskeletal: Normal range of motion. He exhibits no edema.   Lymphadenopathy:     He has no cervical adenopathy.   Neurological: He is alert. He is not disoriented.   Oriented to person and place today   Skin: Skin is warm and dry. He is not diaphoretic.   Old blister/ wart on right plantar surface of the foot near the big toe   Psychiatric: He has a normal mood and affect. His behavior is normal. His affect is not labile. His speech is delayed. Cognition and memory are not impaired.   Nursing note and vitals reviewed.      Significant Labs:   CBC:     Recent Labs  Lab 10/08/17  0659 10/09/17  0436   WBC 26.69* 26.70*   HGB 9.6* 8.9*   HCT 27.9* 25.3*   * 143*     CMP:     Recent Labs  Lab 10/08/17  0659 10/09/17  0436   * 129*   K 4.7 4.7    105   CO2 14* 13*   * 120*   BUN 47* 49*   CREATININE 1.5* 1.3   CALCIUM 8.5* 8.4*   PROT 6.0 5.8*   ALBUMIN 2.6* 2.3*   BILITOT 32.9* 30.6*   ALKPHOS 155* 148*   * 115*   ALT 59* 59*   ANIONGAP 12 11   EGFRNONAA >60.0 >60.0     Coagulation:     Recent Labs  Lab 10/09/17  0436   INR 1.5*     All pertinent labs within the past 24 hours have been reviewed.    Significant Imaging: CXR: I have reviewed all pertinent results/findings within the past 24 hours and my personal findings are:  NG in appropriate position

## 2017-10-09 NOTE — PT/OT/SLP PROGRESS
"Occupational Therapy  Treatment    Jhonny Diana   MRN: [id number]   Admitting Diagnosis: Alcoholic hepatitis with ascites    OT Date of Treatment: 10/09/17   OT Start Time: 1355  OT Stop Time: 1425  OT Total Time (min): 30 min    Billable Minutes:  Self Care/Home Management 20 and Therapeutic Activity 10    General Precautions: Standard, fall, aspiration    Subjective:  Communicated with RN prior to session.    Pt agreeable to treatment    Pain/Comfort  Pain Rating 1: 0/10    Objective:  Patient found with: telemetry, peripheral IV     Functional Mobility:  Bed Mobility:  Scooting/Bridging: Stand by Assistance  Supine to Sit: Minimum Assistance    Transfers:   Sit <> Stand Assistance: Stand By Assistance  Sit <> Stand Assistive Device: Rolling Walker    Toilet Transfer Assistance: Stand By Assistance  Toilet Transfer Assistive Device: Rolling Walker    Shower Transfer Assistance: Contact Guard Assistance  Shower Transfer Assistive Device: Grab bar, Shower bench    Functional Ambulation: CGA with RW 68 feet    Activities of Daily Living:  UE Dressing Level of Assistance: Minimum assistance    LE Dressing Level of Assistance: Minimum assistance     Toileting Where Assessed: Toilet  Toileting Level of Assistance: Minimum assistance    AM-PAC 6 CLICK ADL   How much help from another person does this patient currently need?   1 = Unable, Total/Dependent Assistance  2 = A lot, Maximum/Moderate Assistance  3 = A little, Minimum/Contact Guard/Supervision  4 = None, Modified [place]/Independent    Putting on and taking off regular lower body clothing? : 2  Bathing (including washing, rinsing, drying)?: 2  Toileting, which includes using toilet, bedpan, or urinal? : 2  Putting on and taking off regular upper body clothing?: 3  Taking care of personal grooming such as brushing teeth?: 3  Eating meals?: 4  Total Score: 16     AM-PAC Raw Score CMS "G-Code Modifier Level of Impairment Assistance   6 % Total / Unable   7 " - 8 CM 80 - 100% Maximal Assist   9-13 CL 60 - 80% Moderate Assist   14 - 19 CK 40 - 60% Moderate Assist   20 - 22 CJ 20 - 40% Minimal Assist   23 CI 1-20% SBA / CGA   24 CH 0% Independent/ Mod I       Patient left seated on shower bench to bathe  with RN and mother present    ASSESSMENT:  Jhonny Diana is a 27 y.o. male with a medical diagnosis of Alcoholic hepatitis with ascites and presents with significant improvements with ADLs and functional mobility today. Goals revised and pt would continue to benefit from OT services to maximize functional (I) and safety.    Rehab identified problem list/impairments: Rehab identified problem list/impairments: weakness, impaired endurance, impaired self care skills, impaired functional mobilty, gait instability, impaired balance    Rehab potential is good.    Activity tolerance: Good    Discharge recommendations: Discharge Facility/Level Of Care Needs: rehabilitation facility     Barriers to discharge: Barriers to Discharge: Inaccessible home environment    Equipment recommendations:  (TBD)     GOALS:    Occupational Therapy Goals        Problem: Occupational Therapy Goal    Goal Priority Disciplines Outcome Interventions   Occupational Therapy Goal     OT, PT/OT Ongoing (interventions implemented as appropriate)    Description:  Goals to be met by: 10/17/2017    Patient will increase functional independence with ADLs by performing:    UE Dressing with Supervision.  LE Dressing with Supervision.  Grooming while seated with Supervision.  Toileting from bedside commode with Supervision for hygiene and clothing management.   Sitting at edge of bed or bedside chair  x30 minutes with Modified [place].  Rolling to Bilateral with Supervision.   Supine to sit with Supervision.  Stand pivot transfers with Supervision.  Toilet transfer to bedside commode with Supervision.                       Plan:  Patient to be seen 5 x/week to address the above listed problems via  self-care/home management, therapeutic activities, therapeutic exercises  Plan of Care expires: 10/17/17  Plan of Care reviewed with: patient, mother         JULIETTE Kirby  10/09/2017

## 2017-10-09 NOTE — PLAN OF CARE
Problem: Physical Therapy Goal  Goal: Physical Therapy Goal  Goals to be met by: 10/16/2017    Patient will increase functional independence with mobility by performin. Supine to sit with MInimal Assistance Met 10/9/17   Upgrade: supine to sit with SBA  2. Sit to supine with MInimal Assistance  3. Sit to stand transfer with Minimal Assistance - met 10/5   Upgrade: sit to stand transfer with SBA using LRAD or no AD  4. Bed to chair transfer with Moderate Assistance using LRAD or no AD.  5. Gait  x 20 feet with Moderate Assistance using LRAD or no Ad. Met 10/9/17   Upgrade: gait x150 feet with SBA using LRAD or no AD  6. Ascend/descend 4 stair with bilateral Handrails Moderate Assistance using LRAD or no AD.  7. Stand for 8 minutes with Minimal Assistance using LRAD or no AD.  8. Lower extremity exercise program x20 reps per handout, with independence      Outcome: Ongoing (interventions implemented as appropriate)  2 goals met, 3 goals upgraded today

## 2017-10-09 NOTE — ASSESSMENT & PLAN NOTE
27 year old male with a history of alcohol abuse transferred from OSH for higher level of care.On admission patient came in with severe AH and was pretty obtunded. He has made improvements in his mentation yet remains a bit confused which has limited the liver transplant eval.    Recommendations:  - Continue liquid diet (or advance if patient can tolerate it) would like patient to meet the following: calorie 30-40kcal/day and protein 1.5 gram/kg/day (if unable will require NGT)  - Continue Lactulose (can be titrated to ~ 3-5 BM per day) and Rifaximin  - Continue daily PT/OT

## 2017-10-09 NOTE — ASSESSMENT & PLAN NOTE
· 2/2 diarrhea given lactulose.  Currently on 10g daily with improving BMs  · Still acidotic, will monitor  · Sodium Bicarb TID

## 2017-10-09 NOTE — PLAN OF CARE
10/6/17 Resp:  K pneumoniae ESBL.  Please place on contact isolation with appropriate use of PPE and hand hygiene.  Contact Infection Control @ [phone] for guidelines on discontinuation of isolation.  Spoke to Tico about MDRO and new order for contact isolation.

## 2017-10-09 NOTE — PROGRESS NOTES
[facility]  Adult Nutrition  Progress Note    SUMMARY     Recommendations    Recommendation/Intervention: Continue current high protien, high calorie diet, with Boost Plus TID encouraging oral intake. Pt with very little intake x 2 weeks PTA. If oral diet does not resume to >50% of meals, recommend restarting TF. Recommend noctural TF to continue to encourage daily PO intake. RD following.     Goals: Meet >80% of EEN/EPN  Nutrition Goal Status: goal not met  Communication of RD Recs: reviewed with MD    Reason for Assessment    Reason for Assessment: RD follow-up  Diagnosis: liver disease (decompensated EtOH hepatitis )  Relevent Medical History: none significant aside from EtOH abuse   Interdisciplinary Rounds: did not attend     General Information Comments: Pt's diet advanced and TF d/c'd but pt eating 10%; pt not aware enough to eat lunch today; per mom will encourage oral intake at every meal, spoke with MD who is encouraging oral diet, hepatology wants to replace NGT    Nutrition Discharge Planning: Unable to determine    Nutrition Prescription Ordered    Current Diet Order: High protein, high calorie  Nutrition Order Comments: Dental Soft  Current Nutrition Support Formula Ordered: Discontinued  Current Nutrition Support Rate Ordered: 25 (ml)  Current Nutrition Support Frequency Ordered: mL/hr        Evaluation of Received Nutrients/Fluid Intake    Enteral Calories (kcal): 0  Enteral Protein (gm): 0  Enteral (Free Water) Fluid (mL): 0      Energy Calories Required: not meeting needs  % Kcal Needs: 0      Protein Required: not meeting needs  % Protein Needs: 0     IV Fluid (mL): 0        Fluid Required: not meeting needs  Comments: -  Tolerance: tolerating  % Intake of Estimated Energy Needs: 0 - 25 %  % Meal Intake: 25%     Nutrition Risk Screen     Nutrition Risk Screen: dysphagia or difficulty swallowing    Nutrition/Diet History    Patient Reported Diet/Restrictions/Preferences:  "general  Typical Food/Fluid Intake: Very poor intake for 2-3 weeks PTA  Food Preferences: No Muslim or cultural food preferences noted        Factors Affecting Nutritional Intake: abdominal distention                Labs/Tests/Procedures/Meds       Pertinent Labs Reviewed: reviewed  Pertinent Labs Comments: Na 129, bun 29, glu 120, Alk phos 148, TBili 30.6, , ALT 59  Pertinent Medications Reviewed: reviewed  Pertinent Medications Comments: lactulose, heparin    Physical Findings    Overall Physical Appearance: overweight, lethargic  Tubes: nasogastric tube     Skin: jaundice, intact    Anthropometrics    Temp: 99.1 °F (37.3 °C)     Height: 5' 7" (170.2 cm)  Weight Method: Bed Scale  Weight: 83.6 kg (184 lb 4.9 oz)  Ideal Body Weight (IBW), Male: 148 lb     % Ideal Body Weight, Male (lb): 124.24 lb     BMI (Calculated): 28.9  BMI Grade: 25 - 29.9 - overweight     Usual Body Weight (UBW), kg:  (2/2 fluid shifts)                      Estimated/Assessed Needs    Weight Used For Calorie Calculations: 83.4 kg (183 lb 13.8 oz)      Energy Calorie Requirements (kcal): 8197-2408 kcal/d  Energy Need Method: Nezperce-St Jeor        RMR (Nezperce-St. 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    Nutrition Problem  Inadequate energy intake    Related to (etiology):   TF d/c with poor oral intake    Signs and Symptoms (as evidenced by):  Meeting 25% EEN and EPN    Interventions/Recommendations (treatment strategy):  See recs    Nutrition Diagnosis Status:   New                  Monitor and Evaluation    Food and Nutrient Intake: energy intake, food and beverage intake, enteral nutrition intake  Food and Nutrient Adminstration: diet order, enteral and parenteral nutrition " administration     Physical Activity and Function: nutrition-related ADLs and IADLs  Anthropometric Measurements: weight, weight change, body mass index  Biochemical Data, Medical Tests and Procedures: inflammatory profile, lipid profile, glucose/endocrine profile, gastrointestinal profile, electrolyte and renal panel  Nutrition-Focused Physical Findings: overall appearance    Nutrition Risk    Level of Risk: other (see comments) (F/u 2 x weekly)    Nutrition Follow-Up    RD Follow-up?: Yes

## 2017-10-09 NOTE — ASSESSMENT & PLAN NOTE
· WBC remains elevated - stable at 26  · Remains afebrile  · Repeat blood cultures NGTD  · UA negative  · Resp cx ESBL Klebsiella.  ID following.  Will discuss antibiotics

## 2017-10-10 LAB
ALBUMIN SERPL BCP-MCNC: 2.2 G/DL
ALP SERPL-CCNC: 147 U/L
ALT SERPL W/O P-5'-P-CCNC: 62 U/L
ANION GAP SERPL CALC-SCNC: 12 MMOL/L
AST SERPL-CCNC: 118 U/L
BACTERIA FLD CULT: NORMAL
BASOPHILS # BLD AUTO: 0.04 K/UL
BASOPHILS NFR BLD: 0.2 %
BILIRUB DIRECT SERPL-MCNC: >14 MG/DL
BILIRUB SERPL-MCNC: 29.8 MG/DL
BUN SERPL-MCNC: 48 MG/DL
CALCIUM SERPL-MCNC: 8.4 MG/DL
CHLORIDE SERPL-SCNC: 103 MMOL/L
CO2 SERPL-SCNC: 12 MMOL/L
CREAT SERPL-MCNC: 1.4 MG/DL
DIFFERENTIAL METHOD: ABNORMAL
EOSINOPHIL # BLD AUTO: 0.1 K/UL
EOSINOPHIL NFR BLD: 0.3 %
ERYTHROCYTE [DISTWIDTH] IN BLOOD BY AUTOMATED COUNT: 14.7 %
EST. GFR  (AFRICAN AMERICAN): >60 ML/MIN/1.73 M^2
EST. GFR  (NON AFRICAN AMERICAN): >60 ML/MIN/1.73 M^2
GLUCOSE SERPL-MCNC: 110 MG/DL
HCT VFR BLD AUTO: 25.7 %
HGB BLD-MCNC: 9 G/DL
INR PPP: 1.5
LYMPHOCYTES # BLD AUTO: 1.7 K/UL
LYMPHOCYTES NFR BLD: 6.9 %
MAGNESIUM SERPL-MCNC: 2 MG/DL
MCH RBC QN AUTO: 37.7 PG
MCHC RBC AUTO-ENTMCNC: 35 G/DL
MCV RBC AUTO: 108 FL
MITOGEN NIL: 0.33 IU/ML
MONOCYTES # BLD AUTO: 1.9 K/UL
MONOCYTES NFR BLD: 7.9 %
NEUTROPHILS # BLD AUTO: 20.3 K/UL
NEUTROPHILS NFR BLD: 84.7 %
NIL: 0.07 IU/ML
OSMOLALITY SERPL: 298 MOSM/KG
OSMOLALITY UR: 438 MOSM/KG
PHOSPHATE SERPL-MCNC: 3.2 MG/DL
PLATELET # BLD AUTO: 161 K/UL
PMV BLD AUTO: 11.1 FL
POTASSIUM SERPL-SCNC: 4.4 MMOL/L
PROT SERPL-MCNC: 5.5 G/DL
PROTHROMBIN TIME: 15 SEC
RBC # BLD AUTO: 2.39 M/UL
SODIUM SERPL-SCNC: 127 MMOL/L
SODIUM UR-SCNC: <20 MMOL/L
TB ANTIGEN NIL: -0.01 IU/ML
TB ANTIGEN: 0.06 IU/ML
TB GOLD: ABNORMAL
URATE SERPL-MCNC: 6.2 MG/DL
WBC # BLD AUTO: 24.13 K/UL

## 2017-10-10 PROCEDURE — 20600001 HC STEP DOWN PRIVATE ROOM

## 2017-10-10 PROCEDURE — 84550 ASSAY OF BLOOD/URIC ACID: CPT

## 2017-10-10 PROCEDURE — 83930 ASSAY OF BLOOD OSMOLALITY: CPT

## 2017-10-10 PROCEDURE — 97110 THERAPEUTIC EXERCISES: CPT

## 2017-10-10 PROCEDURE — 25000003 PHARM REV CODE 250: Performed by: INTERNAL MEDICINE

## 2017-10-10 PROCEDURE — 63600175 PHARM REV CODE 636 W HCPCS

## 2017-10-10 PROCEDURE — 83735 ASSAY OF MAGNESIUM: CPT

## 2017-10-10 PROCEDURE — 25000003 PHARM REV CODE 250: Performed by: CLINICAL NURSE SPECIALIST

## 2017-10-10 PROCEDURE — 80076 HEPATIC FUNCTION PANEL: CPT

## 2017-10-10 PROCEDURE — 63600175 PHARM REV CODE 636 W HCPCS: Performed by: HOSPITALIST

## 2017-10-10 PROCEDURE — 84100 ASSAY OF PHOSPHORUS: CPT

## 2017-10-10 PROCEDURE — 92526 ORAL FUNCTION THERAPY: CPT

## 2017-10-10 PROCEDURE — 85610 PROTHROMBIN TIME: CPT

## 2017-10-10 PROCEDURE — 63600175 PHARM REV CODE 636 W HCPCS: Performed by: NURSE PRACTITIONER

## 2017-10-10 PROCEDURE — 25000003 PHARM REV CODE 250: Performed by: HOSPITALIST

## 2017-10-10 PROCEDURE — P9047 ALBUMIN (HUMAN), 25%, 50ML: HCPCS | Performed by: HOSPITALIST

## 2017-10-10 PROCEDURE — 80048 BASIC METABOLIC PNL TOTAL CA: CPT

## 2017-10-10 PROCEDURE — 97530 THERAPEUTIC ACTIVITIES: CPT

## 2017-10-10 PROCEDURE — 99231 SBSQ HOSP IP/OBS SF/LOW 25: CPT | Mod: ,,, | Performed by: INTERNAL MEDICINE

## 2017-10-10 PROCEDURE — 99233 SBSQ HOSP IP/OBS HIGH 50: CPT | Mod: ,,, | Performed by: HOSPITALIST

## 2017-10-10 PROCEDURE — G8998 SWALLOW D/C STATUS: HCPCS | Mod: CH

## 2017-10-10 PROCEDURE — 97116 GAIT TRAINING THERAPY: CPT

## 2017-10-10 PROCEDURE — 85025 COMPLETE CBC W/AUTO DIFF WBC: CPT

## 2017-10-10 RX ORDER — ALBUMIN HUMAN 250 G/1000ML
25 SOLUTION INTRAVENOUS 2 TIMES DAILY
Status: DISCONTINUED | OUTPATIENT
Start: 2017-10-10 | End: 2017-10-12

## 2017-10-10 RX ORDER — LACTULOSE 10 G/15ML
20 SOLUTION ORAL 3 TIMES DAILY
Status: DISCONTINUED | OUTPATIENT
Start: 2017-10-10 | End: 2017-10-11

## 2017-10-10 RX ORDER — TRAMADOL HYDROCHLORIDE 50 MG/1
50 TABLET ORAL EVERY 4 HOURS PRN
Status: DISCONTINUED | OUTPATIENT
Start: 2017-10-10 | End: 2017-10-17

## 2017-10-10 RX ADMIN — HEPARIN SODIUM 5000 UNITS: 5000 INJECTION, SOLUTION INTRAVENOUS; SUBCUTANEOUS at 05:10

## 2017-10-10 RX ADMIN — PANTOPRAZOLE SODIUM 40 MG: 40 TABLET, DELAYED RELEASE ORAL at 04:10

## 2017-10-10 RX ADMIN — SODIUM BICARBONATE 650 MG TABLET 1300 MG: at 02:10

## 2017-10-10 RX ADMIN — LACTULOSE 20 G: 20 SOLUTION ORAL at 07:10

## 2017-10-10 RX ADMIN — ALBUMIN (HUMAN) 25 G: 12.5 SOLUTION INTRAVENOUS at 09:10

## 2017-10-10 RX ADMIN — SODIUM BICARBONATE 650 MG TABLET 1300 MG: at 05:10

## 2017-10-10 RX ADMIN — ONDANSETRON 4 MG: 2 INJECTION INTRAMUSCULAR; INTRAVENOUS at 07:10

## 2017-10-10 RX ADMIN — RIFAXIMIN 550 MG: 550 TABLET ORAL at 09:10

## 2017-10-10 RX ADMIN — ALBUMIN (HUMAN) 25 G: 12.5 SOLUTION INTRAVENOUS at 08:10

## 2017-10-10 RX ADMIN — RIFAXIMIN 550 MG: 550 TABLET ORAL at 08:10

## 2017-10-10 RX ADMIN — LACTULOSE 10 G: 20 SOLUTION ORAL at 09:10

## 2017-10-10 RX ADMIN — PANTOPRAZOLE SODIUM 40 MG: 40 TABLET, DELAYED RELEASE ORAL at 05:10

## 2017-10-10 RX ADMIN — HEPARIN SODIUM 5000 UNITS: 5000 INJECTION, SOLUTION INTRAVENOUS; SUBCUTANEOUS at 02:10

## 2017-10-10 RX ADMIN — SODIUM BICARBONATE 650 MG TABLET 1300 MG: at 08:10

## 2017-10-10 RX ADMIN — HEPARIN SODIUM 5000 UNITS: 5000 INJECTION, SOLUTION INTRAVENOUS; SUBCUTANEOUS at 09:10

## 2017-10-10 NOTE — ASSESSMENT & PLAN NOTE
Hyponatremia continued to worsen today and based on studies its most likely secondary to hypervolemic hyponatremia     Recommendations:  - Restrict to 1L  - Albumin X 2 doses today

## 2017-10-10 NOTE — ASSESSMENT & PLAN NOTE
27 year old male with a history of alcohol abuse transferred from [facility] for higher level of care.On admission patient came in with severe AH.     This morning patient is the most alert and oriented he has been and was able to interact during the entire interview.    Recommendations:  - Continue liquid diet (or advance if patient can tolerate it) would like patient to meet the following: calorie 30-40kcal/day and protein 1.5 gram/kg/day (if unable will require NGT)  - Continue Lactulose (can be titrated to ~ 3-5 BM per day) and Rifaximin  - Continue daily PT/OT  - Will initiate liver transplant (of note patient is willing to complete rehab after transplant)

## 2017-10-10 NOTE — PROGRESS NOTES
"[facility]  Hepatology  Progress Note    Patient Name: Jhonny Diana  MRN: [id number]  Admission Date: 9/27/2017  Hospital Length of Stay: 13 days  Attending Provider: Armand Carrasco MD   Primary Care Physician: Provider Notinsystem  Principal Problem:Alcoholic hepatitis with ascites    Subjective:     Transplant status: No    HPI: 28 yo male with hx of childhood asthma and heavy alcohol abuse (fifth of liquor daily since teenager, last drink 2.5 wks ago per mother) who was transferred to [facility] for higher level of care/liver transplant evaluation.    History obtained per chart review and discussion with mother: Patient initially presented to OSH for a 2 mo hx of progressively worsening abdominal pain with associated fatigue, jaundice, and abdominal distention with ~20# weight gain.     The patient's mother states he began having abdominal pain about 6 months ago.  States he underwent many tests, all of which were inconclusive.  He then began to have abdominal swelling about two months ago and was in&out of the local ER.  He was still actively drinking up until about 2.5 weeks ago according to the mother but she is unsure of this.  At the OSH the patient underwent an EGD which showed g1EV.  He was also diagnosed with "pancreatitis" based on an elevated lipase.  The patient was also placed on prednisone given concerns for alcoholic hepatitis.           Interval History:   No acute events overnight.  Yesterday patient underwent a paracentesis, worked with Pt, and was evaluated by psych.    Current Facility-Administered Medications   Medication    albumin human 25% bottle 25 g    heparin (porcine) injection 5,000 Units    lactulose 20 gram/30 mL solution Soln 10 g    ondansetron injection 4 mg    pantoprazole EC tablet 40 mg    pneumoc 13-arianne conj-dip cr(PF) 0.5 mL    rifAXIMin tablet 550 mg    sodium bicarbonate tablet 1,300 mg    tramadol tablet 50 mg       Objective:     Vital Signs (Most " Recent):  Temp: 98.4 °F (36.9 °C) (10/10/17 1209)  Pulse: 109 (10/10/17 1209)  Resp: 18 (10/10/17 1209)  BP: 127/64 (10/10/17 1209)  SpO2: 97 % (10/10/17 1209) Vital Signs (24h Range):  Temp:  [98.4 °F (36.9 °C)-99.1 °F (37.3 °C)] 98.4 °F (36.9 °C)  Pulse:  [107-113] 109  Resp:  [18-20] 18  SpO2:  [96 %-98 %] 97 %  BP: (110-133)/(58-64) 127/64     Weight: 83.6 kg (184 lb 4.9 oz) (10/06/17 0400)  Body mass index is 28.87 kg/m².    Physical Exam   Constitutional: He is oriented to person, place, and time. No distress.   HENT:   Head: Normocephalic.   Eyes: Pupils are equal, round, and reactive to light. Scleral icterus is present.   Neck: Normal range of motion.   Cardiovascular: Normal rate and regular rhythm.    Pulmonary/Chest: Breath sounds normal. No respiratory distress. He has no wheezes. He has no rales. He exhibits no tenderness.   Abdominal: Bowel sounds are normal. He exhibits distension. He exhibits no mass. There is no tenderness. There is no rebound and no guarding. No hernia.   Musculoskeletal: Normal range of motion. He exhibits edema.   Neurological: He is alert and oriented to person, place, and time.   Skin: He is not diaphoretic.       MELD-Na score: 31 at 10/10/2017  4:50 AM  MELD score: 27 at 10/10/2017  4:50 AM  Calculated from:  Serum Creatinine: 1.4 mg/dL at 10/10/2017  4:50 AM  Serum Sodium: 127 mmol/L at 10/10/2017  4:50 AM  Total Bilirubin: 29.8 mg/dL at 10/10/2017  4:50 AM  INR(ratio): 1.5 at 10/10/2017  4:50 AM  Age: 27 years    Significant Labs:  CBC:   Recent Labs  Lab 10/10/17  0450   WBC 24.13*   RBC 2.39*   HGB 9.0*   HCT 25.7*        CMP:   Recent Labs  Lab 10/10/17  0450      CALCIUM 8.4*   ALBUMIN 2.2*   PROT 5.5*   *   K 4.4   CO2 12*      BUN 48*   CREATININE 1.4   ALKPHOS 147*   ALT 62*   *   BILITOT 29.8*     Coagulation:   Recent Labs  Lab 10/10/17  0450   INR 1.5*       Significant Imaging:  No recent imaging to review    Assessment/Plan:      Decompensated hepatic cirrhosis with ascites and HE    27 year old male with a history of alcohol abuse transferred from [facility] for higher level of care.On admission patient came in with severe AH.     This morning patient is the most alert and oriented he has been and was able to interact during the entire interview.    Recommendations:  - Continue liquid diet (or advance if patient can tolerate it) would like patient to meet the following: calorie 30-40kcal/day and protein 1.5 gram/kg/day (if unable will require NGT)  - Continue Lactulose (can be titrated to ~ 3-5 BM per day) and Rifaximin  - Continue daily PT/OT  - Will initiate liver transplant (of note patient is willing to complete rehab after transplant)        Hyponatremia    Hyponatremia continued to worsen today and based on studies its most likely secondary to hypervolemic hyponatremia     Recommendations:  - Restrict to 1L  - Albumin X 2 doses today            Thank you for your consult. I will follow-up with patient. Please contact us if you have any additional questions.    Divya Booker M.D.  Gastroenterology Fellow, PGY-IV  Pager: [phone]  [facility]

## 2017-10-10 NOTE — PLAN OF CARE
Problem: Patient Care Overview  Goal: Plan of Care Review  Outcome: Ongoing (interventions implemented as appropriate)  Pt w/ intermittent confusion throughout the day.  All orientation questions answered appropriately.  VSS - BPs 120s/50-60s, O2 sats >97% on RA, Tmax 99.8F.  TELE monitoring in progress, NSR-ST w/ HR 90s-110s.  Jaundice noted.  Sclera yellowed.  Blister on lip w/ intermittent bleeding throughout the day.  AM labs monitored, hyponatremia worsened Na+ 127.  , ALT 62.  Albumin given once so far this shift.  High protein high calorie diet ordered.  NG tube pulled yesterday however pt w/ decreased appetite noted only eating a few bites of meals.  Boost encouraged.  Pt c/o abdominal distention and fullness.  Last para done yesterday 10/9 w/ 3.3L off.  Decreased urine output noted.  2 unmeasured and 200 cc measured per urinal.  1 BM today. Lactulose given as scheduled this AM.  Ambulation and activity encouraged throughout the day.  Pt seen walking around the unit with walker this afternoon.  Pt up to bathroom with walker and assistance.  Contact isolation maintained for ESBL in the sputum.  Hand washing encouraged.  No acute events/falls/injuries.  Bed alarm on when mother not at bedside.  Fall precautions in place.  Non-skid socks on feet.  Call bell in reach at all times. Will continue to monitor.

## 2017-10-10 NOTE — PT/OT/SLP PROGRESS
Physical Therapy  Treatment    Jhonny Diana   MRN: [id number]   Admitting Diagnosis: Alcoholic hepatitis with ascites    PT Received On: 10/10/17  PT Start Time: 1052     PT Stop Time: 1130    PT Total Time (min): 38 min       Billable Minutes:  Gait Training 15 minutes , Therapeutic Activity 12 minutes  and Therapeutic Exercise 11 minutes    Treatment Type: Treatment  PT/PTA: PT     PTA Visit Number: 0       General Precautions: Standard, fall  Orthopedic Precautions: N/A   Braces: N/A    Do you have any cultural, spiritual, Voodoo conflicts, given your current situation?: none reported     Subjective:  Communicated with RN prior to session.  Pt agreeable to PT session.     Pain/Comfort  Pain Rating 1: 0/10  Pain Rating Post-Intervention 1: 0/10    Objective:   Patient found with: telemetry, peripheral IV    Functional Mobility:  Bed Mobility:   Rolling/Turning to Left: Stand by assistance, With side rail  Scooting/Bridging: Modified Independent (anterior scoot towards EOB)  Supine to Sit: Stand by Assistance (from L SL)  Sit to Supine:  (Pt UI)    Transfers:  Sit <> Stand Assistance: Contact Guard Assistance  Sit <> Stand Assistive Device: Rolling Walker  Bed <> Chair Technique: Stand Pivot  Bed <> Chair Assistance: Contact Guard Assistance  Bed <> Chair Assistive Device: Rolling Walker    Gait:   Gait Distance: 190' CGA using RW. Cues for upright posture and safety. Attempted gait without AD: Min A 30'   Assistance 1: Contact Guard Assistance, Minimum assistance  Gait Assistive Device: No device, Rolling walker  Gait Pattern: reciprocal  Gait Deviation(s): decreased saskia, increased time in double stance, decreased velocity of limb motion, decreased step length, decreased stride length    Stairs:  Pt ascended/descend 1 step with No Assistive Device with B hands on R handrail for support  with Moderate Assistance.     Balance:   Static Sit: GOOD: Takes MODERATE challenges from all directions  Dynamic Sit:  GOOD-: Maintains balance through MODERATE excursions of active trunk movement,     Static Stand: FAIR+: Takes MINIMAL challenges from all directions  Dynamic stand: FAIR: Needs CONTACT GUARD during gait     Therapeutic Activities and Exercises:    CHAKRABORTY BALANCE SCALE     1.SITTING TO STANDING:  (3) Pt able to stand independently using hands     2. STANDING UNSUPPORTED: (3) Pt able to stand 2 minutes with supervision     3. SITTING WITH BACK UNSUPPORTED BUT FEET SUPPORTED ON FLOOR: (4) Pt able to sit safely and securely 2 minutes     4. STANDING TO SITTING:(3) Pt controls descent by using hands     5. TRANSFERS:(2) Pt able to transfer with verbal cueing and/or supervision    6. STANDING UNSUPPORTED WITH EYES CLOSED:(3) Pt able to stand 10 seconds with supervision    7. STANDING UNSUPPORTED WITH FEET TOGETHER:(2) Pt able to place feet together independently and to hold for 30 seconds    8. REACHING FORWARD WITH OUTSTRETCHED ARM WHILE STANDING:(1) Pt reaches forward but needs supervision    9.  OBJECT FROM THE FLOOR FROM A STANDING POSITION:(3) Pt able to  slipper but needs supervision    10. TURNING TO LOOK BEHIND OVER LEFT AND RIGHT SHOULDERS WHILE STANDING:(3) Pt looks behind one side only other side shows less weight shift    11. TURN 360 DEGREES:(2) Pt able to turn 360 degrees safely but slowly    12. PLACING ALTERNATE FOOT ON STEP OR STOOL WHILE STANDING UNSUPPORTED:(3) Pt able to stand independently and complete 8 steps >20 seconds    13. STANDING UNSUPPORTED ONE FOOT IN FRONT: (0) Pt loses balance while stepping or standing     14. STANDING ON ONE LEG:(0) Pt unable to try or needs assist to prevent fall    Total Score 32/56    41-56 = low fall risk  21-40 = medium fall risk  0 -20 = high fall risk    · Pt educated pt on incr OOB activity including sitting in bedside chair majority of day and amb with nsg and PCT.   · Educated pt on being appropriate to transfer with nsg and PCT; safe to transfer  with 1 person assistance and RW; **Mobility Technician appropriate to perform: out of bed, up to chair, back to bed, bed exercises, PROM, ambulation in room, ambulation in hallway using RW  · Updated white board with appropriate PT information; notified nsg     Pt completed the following seated BLE therapeutic exercises x 10 reps: GS, hip marching, LAQ, and AP.  · PT provided demonstration on proper technique to perform exercise. Pt completed exercises appropriately. Pt educated on performing hospital exercise program 3x/day. Written copy provided to patient. Pt verbalized understanding     AM-PAC 6 CLICK MOBILITY  How much help from another person does this patient currently need?   1 = Unable, Total/Dependent Assistance  2 = A lot, Maximum/Moderate Assistance  3 = A little, Minimum/Contact Guard/Supervision  4 = None, Modified [place]/Independent    Turning over in bed (including adjusting bedclothes, sheets and blankets)?: 4  Sitting down on and standing up from a chair with arms (e.g., wheelchair, bedside commode, etc.): 3  Moving from lying on back to sitting on the side of the bed?: 3  Moving to and from a bed to a chair (including a wheelchair)?: 3  Need to walk in hospital room?: 3  Climbing 3-5 steps with a railing?: 2  Total Score: 18    AM-PAC Raw Score CMS G-Code Modifier Level of Impairment Assistance   6 % Total / Unable   7 - 9 CM 80 - 100% Maximal Assist   10 - 14 CL 60 - 80% Moderate Assist   15 - 19 CK 40 - 60% Moderate Assist   20 - 22 CJ 20 - 40% Minimal Assist   23 CI 1-20% SBA / CGA   24 CH 0% Independent/ Mod I     Patient left up in chair with call button in reach and mother present.    Assessment:  Jhonny Diana is a 27 y.o. male with a medical diagnosis of Alcoholic hepatitis with ascites and presents with problems listed below. Pt progressing towards goals, but not at PLOF. Pt tolerated session well with no increase in pain/discomfort.  Pt is improving with therapy evidenced by  ambulating 190' with CGA using RW. Attempt gait without AD but patient required Min A for balance. CHAKRABORTY Balance exam performed on this date indicating patient is at medium fall risk and patient required Mod A for ascending 1 step. Pt would benefit from continued PT services to improve overall functional mobility. Recommend d/c to Rehab to maximize functional independence.      Rehab identified problem list/impairments: Rehab identified problem list/impairments: weakness, impaired endurance, impaired self care skills, impaired functional mobilty, gait instability, impaired balance, decreased lower extremity function, decreased safety awareness, impaired coordination    Rehab potential is good.    Activity tolerance: Good    Discharge recommendations: Discharge Facility/Level Of Care Needs: rehabilitation facility     Barriers to discharge: Barriers to Discharge: Inaccessible home environment    Equipment recommendations: Equipment Needed After Discharge:  (TBD )     GOALS:    Physical Therapy Goals        Problem: Physical Therapy Goal    Goal Priority Disciplines Outcome Goal Variances Interventions   Physical Therapy Goal     PT/OT, PT Ongoing (interventions implemented as appropriate)     Description:  Goals to be met by: 10/16/2017    Patient will increase functional independence with mobility by performin. Supine to sit with MInimal Assistance Met 10/9/17  1A. supine to sit with SBA- Met  1B. Mod I   2. Sit to supine with MInimal Assistance  3. Sit to stand transfer with Minimal Assistance - met 10/5  3A: sit to stand transfer with SBA using LRAD or no AD  4. Bed to chair transfer with Moderate Assistance using LRAD or no AD.- Met  4A. SBA using LRAD  5. Gait  x 20 feet with Moderate Assistance using LRAD or no Ad. Met 10/9/17  5A: gait x150 feet with SBA using LRAD or no AD  6. Ascend/descend 4 stair with bilateral Handrails Moderate Assistance using LRAD or no AD.  7. Stand for 8 minutes with Minimal  Assistance using LRAD or no AD.  8. Lower extremity exercise program x20 reps per handout, with independence                         PLAN:    Patient to be seen 4 x/week  to address the above listed problems via gait training, therapeutic activities, therapeutic exercises, neuromuscular re-education  Plan of Care expires: 11/02/17  Plan of Care reviewed with: patient, mother    Charline Zavala, PT  10/10/2017

## 2017-10-10 NOTE — PLAN OF CARE
Problem: SLP Goal  Goal: SLP Goal  Speech Language Pathology Goals  Goals expected to be met by 10/15    1. Pt will tolerate dental soft diet/thin liquids without overt s/s of aspiration  2. Pt will tolerate regular trials without overt s/s of aspiration   Outcome: Outcome(s) achieved Date Met: 10/10/17  Recommend ongoing regular consistency diet and thin liquids. No further SLP needs at this time. Please re-consult should changes occur.     SRIKANTH Zaragoza, CCC-SLP  [phone]  10/10/2017

## 2017-10-10 NOTE — PLAN OF CARE
10/10/17 1352   Discharge Reassessment   Assessment Type Discharge Planning Reassessment     Transitioned to medicine team A for continued care.  Patient currently alert and oriented with family at the bedside and progressing with therapy. Patient to start inpatient liver transplant evaluation with committee meeting pending completion of required testing.  CM to continue to follow.

## 2017-10-10 NOTE — SUBJECTIVE & OBJECTIVE
Interval History:   No acute events overnight.  Yesterday patient underwent a paracentesis, worked with Pt, and was evaluated by psych.    Current Facility-Administered Medications   Medication    albumin human 25% bottle 25 g    heparin (porcine) injection 5,000 Units    lactulose 20 gram/30 mL solution Soln 10 g    ondansetron injection 4 mg    pantoprazole EC tablet 40 mg    pneumoc 13-arianne conj-dip cr(PF) 0.5 mL    rifAXIMin tablet 550 mg    sodium bicarbonate tablet 1,300 mg    tramadol tablet 50 mg       Objective:     Vital Signs (Most Recent):  Temp: 98.4 °F (36.9 °C) (10/10/17 1209)  Pulse: 109 (10/10/17 1209)  Resp: 18 (10/10/17 1209)  BP: 127/64 (10/10/17 1209)  SpO2: 97 % (10/10/17 1209) Vital Signs (24h Range):  Temp:  [98.4 °F (36.9 °C)-99.1 °F (37.3 °C)] 98.4 °F (36.9 °C)  Pulse:  [107-113] 109  Resp:  [18-20] 18  SpO2:  [96 %-98 %] 97 %  BP: (110-133)/(58-64) 127/64     Weight: 83.6 kg (184 lb 4.9 oz) (10/06/17 0400)  Body mass index is 28.87 kg/m².    Physical Exam   Constitutional: He is oriented to person, place, and time. No distress.   HENT:   Head: Normocephalic.   Eyes: Pupils are equal, round, and reactive to light. Scleral icterus is present.   Neck: Normal range of motion.   Cardiovascular: Normal rate and regular rhythm.    Pulmonary/Chest: Breath sounds normal. No respiratory distress. He has no wheezes. He has no rales. He exhibits no tenderness.   Abdominal: Bowel sounds are normal. He exhibits distension. He exhibits no mass. There is no tenderness. There is no rebound and no guarding. No hernia.   Musculoskeletal: Normal range of motion. He exhibits edema.   Neurological: He is alert and oriented to person, place, and time.   Skin: He is not diaphoretic.       MELD-Na score: 31 at 10/10/2017  4:50 AM  MELD score: 27 at 10/10/2017  4:50 AM  Calculated from:  Serum Creatinine: 1.4 mg/dL at 10/10/2017  4:50 AM  Serum Sodium: 127 mmol/L at 10/10/2017  4:50 AM  Total Bilirubin:  29.8 mg/dL at 10/10/2017  4:50 AM  INR(ratio): 1.5 at 10/10/2017  4:50 AM  Age: 27 years    Significant Labs:  CBC:   Recent Labs  Lab 10/10/17  0450   WBC 24.13*   RBC 2.39*   HGB 9.0*   HCT 25.7*        CMP:   Recent Labs  Lab 10/10/17  0450      CALCIUM 8.4*   ALBUMIN 2.2*   PROT 5.5*   *   K 4.4   CO2 12*      BUN 48*   CREATININE 1.4   ALKPHOS 147*   ALT 62*   *   BILITOT 29.8*     Coagulation:   Recent Labs  Lab 10/10/17  0450   INR 1.5*       Significant Imaging:  No recent imaging to review

## 2017-10-10 NOTE — PT/OT/SLP PROGRESS
"Speech Language Pathology  Treatment/ Discharge Summary    Jhonny Diana   MRN: [id number]   [id number] A    Admitting Diagnosis: Alcoholic hepatitis with ascites    Diet recommendations: Solid Diet Level: Regular  Liquid Diet Level: Thin   · Must be fully awake and alert for PO intake  · Fully upright position for PO intake  · Small bites/ sips  · Slow rate of eating/ drinking  · Refrain from talking prior to swallow completion     SLP Treatment Date: 10/10/17  Speech Start Time: 0920     Speech Stop Time: 0936     Speech Total (min): 16 min       TREATMENT BILLABLE MINUTES:  Treatment Swallowing Dysfunction 16    Has the patient been evaluated by SLP for swallowing? : Yes  Keep patient NPO?: No   General Precautions: Standard, fall  Current Respiratory Status:  (room air)       Subjective:  "About 4 months ago." Pt re: smoking cessation.     Pain/Comfort  Pain Rating 1: 0/10  Pain Rating Post-Intervention 1: 0/10    Objective:     Pt awake and alert upon entry with mom at b/s. HOB raised. Pt observed with ~6oz cup and straw sips water in isolation and as liquid wash, and bites of 3/4 efren cracker. Cough and throat clear observed x1 each during PO intake, however thought to be unrelated to PO intake per pt and mom reporting observation of occasional dry coughing in the absence of PO s/p smoking cessation ~4mo ago. In addition, pt's voice remained clear and dry and SPO2 noted to remain at 98% for duration of this session. Pt and mom educated re: universal aspiration precautions listed above, overt s/s aspiration and SLP POC. Encouraged to request SLP re-consult should pt experience swallowing changes. White board current. No further questions.     Assessment:  Jhonny Diana is a 27 y.o. male with a medical diagnosis of Alcoholic hepatitis with ascites and presents with no further SLP needs at this time. Please re-consult should changes occur.     Discharge recommendations: Discharge Facility/Level Of Care Needs: " rehabilitation facility (Currently no SLP needs upon d/c)     Goals:    SLP Goals     Not on file          Multidisciplinary Problems (Resolved)        Problem: SLP Goal    Goal Priority Disciplines Outcome   SLP Goal   (Resolved)     SLP Outcome(s) achieved   Description:  Speech Language Pathology Goals  Goals expected to be met by 10/15    1. Pt will tolerate dental soft diet/thin liquids without overt s/s of aspiration  2. Pt will tolerate regular trials without overt s/s of aspiration                     Plan:   SLP Follow-up?: No         SRIKANTH Zaragoza, CCC-SLP  [phone]  10/10/2017

## 2017-10-10 NOTE — PLAN OF CARE
Problem: Physical Therapy Goal  Goal: Physical Therapy Goal  Goals to be met by: 10/16/2017    Patient will increase functional independence with mobility by performin. Supine to sit with MInimal Assistance Met 10/9/17  1A. supine to sit with SBA- Met  1B. Mod I   2. Sit to supine with MInimal Assistance  3. Sit to stand transfer with Minimal Assistance - met 10/5  3A: sit to stand transfer with SBA using LRAD or no AD  4. Bed to chair transfer with Moderate Assistance using LRAD or no AD.- Met  4A. SBA using LRAD  5. Gait  x 20 feet with Moderate Assistance using LRAD or no Ad. Met 10/9/17  5A: gait x150 feet with SBA using LRAD or no AD  6. Ascend/descend 4 stair with bilateral Handrails Moderate Assistance using LRAD or no AD.  7. Stand for 8 minutes with Minimal Assistance using LRAD or no AD.  8. Lower extremity exercise program x20 reps per handout, with independence       Outcome: Ongoing (interventions implemented as appropriate)  Pt progressing towards goals. All goals remain appropriate at this time.    Charline Zavala, COLLETTE, PT  10/10/2017

## 2017-10-11 LAB
ABO + RH BLD: NORMAL
ALBUMIN SERPL BCP-MCNC: 2.5 G/DL
ALP SERPL-CCNC: 143 U/L
ALT SERPL W/O P-5'-P-CCNC: 61 U/L
AMMONIA PLAS-SCNC: 105 UMOL/L
ANION GAP SERPL CALC-SCNC: 12 MMOL/L
ANISOCYTOSIS BLD QL SMEAR: SLIGHT
AST SERPL-CCNC: 109 U/L
BASOPHILS # BLD AUTO: 0.03 K/UL
BASOPHILS NFR BLD: 0.1 %
BILIRUB SERPL-MCNC: 29.4 MG/DL
BLD GP AB SCN CELLS X3 SERPL QL: NORMAL
BUN SERPL-MCNC: 46 MG/DL
BURR CELLS BLD QL SMEAR: ABNORMAL
CALCIUM SERPL-MCNC: 8.4 MG/DL
CHLORIDE SERPL-SCNC: 103 MMOL/L
CO2 SERPL-SCNC: 14 MMOL/L
CREAT SERPL-MCNC: 1.5 MG/DL
DIFFERENTIAL METHOD: ABNORMAL
EOSINOPHIL # BLD AUTO: 0.1 K/UL
EOSINOPHIL NFR BLD: 0.2 %
ERYTHROCYTE [DISTWIDTH] IN BLOOD BY AUTOMATED COUNT: 14.8 %
EST. GFR  (AFRICAN AMERICAN): >60 ML/MIN/1.73 M^2
EST. GFR  (NON AFRICAN AMERICAN): >60 ML/MIN/1.73 M^2
GLUCOSE SERPL-MCNC: 108 MG/DL
HBV CORE AB SERPL QL IA: NEGATIVE
HBV SURFACE AG SERPL QL IA: NEGATIVE
HCT VFR BLD AUTO: 23.1 %
HCV AB SERPL QL IA: NEGATIVE
HGB BLD-MCNC: 8.3 G/DL
HYPOCHROMIA BLD QL SMEAR: ABNORMAL
INR PPP: 1.4
LYMPHOCYTES # BLD AUTO: 1.5 K/UL
LYMPHOCYTES NFR BLD: 7.1 %
MAGNESIUM SERPL-MCNC: 2.3 MG/DL
MCH RBC QN AUTO: 38.2 PG
MCHC RBC AUTO-ENTMCNC: 35.9 G/DL
MCV RBC AUTO: 107 FL
MONOCYTES # BLD AUTO: 1.8 K/UL
MONOCYTES NFR BLD: 8.6 %
NEUTROPHILS # BLD AUTO: 17.3 K/UL
NEUTROPHILS NFR BLD: 84 %
OVALOCYTES BLD QL SMEAR: ABNORMAL
PHOSPHATE SERPL-MCNC: 3.2 MG/DL
PLATELET # BLD AUTO: 161 K/UL
PMV BLD AUTO: 10.9 FL
POIKILOCYTOSIS BLD QL SMEAR: SLIGHT
POLYCHROMASIA BLD QL SMEAR: ABNORMAL
POTASSIUM SERPL-SCNC: 4.1 MMOL/L
PREALB SERPL-MCNC: 5 MG/DL
PROT SERPL-MCNC: 5.6 G/DL
PROTHROMBIN TIME: 14.7 SEC
RBC # BLD AUTO: 2.17 M/UL
SODIUM SERPL-SCNC: 129 MMOL/L
T GONDII IGG SER QL IA: ABNORMAL
T GONDII IGG SERPL IA-ACNC: 7.8 IU/ML
WBC # BLD AUTO: 20.79 K/UL

## 2017-10-11 PROCEDURE — P9047 ALBUMIN (HUMAN), 25%, 50ML: HCPCS | Performed by: HOSPITALIST

## 2017-10-11 PROCEDURE — 63600175 PHARM REV CODE 636 W HCPCS: Performed by: HOSPITALIST

## 2017-10-11 PROCEDURE — 99233 SBSQ HOSP IP/OBS HIGH 50: CPT | Mod: ,,, | Performed by: HOSPITALIST

## 2017-10-11 PROCEDURE — 63600175 PHARM REV CODE 636 W HCPCS: Performed by: NURSE PRACTITIONER

## 2017-10-11 PROCEDURE — 82140 ASSAY OF AMMONIA: CPT

## 2017-10-11 PROCEDURE — 90633 HEPA VACC PED/ADOL 2 DOSE IM: CPT | Performed by: INTERNAL MEDICINE

## 2017-10-11 PROCEDURE — 83735 ASSAY OF MAGNESIUM: CPT

## 2017-10-11 PROCEDURE — 86850 RBC ANTIBODY SCREEN: CPT

## 2017-10-11 PROCEDURE — 20600001 HC STEP DOWN PRIVATE ROOM

## 2017-10-11 PROCEDURE — 86481 TB AG RESPONSE T-CELL SUSP: CPT

## 2017-10-11 PROCEDURE — 86704 HEP B CORE ANTIBODY TOTAL: CPT

## 2017-10-11 PROCEDURE — 90471 IMMUNIZATION ADMIN: CPT | Performed by: INTERNAL MEDICINE

## 2017-10-11 PROCEDURE — 90715 TDAP VACCINE 7 YRS/> IM: CPT | Performed by: INTERNAL MEDICINE

## 2017-10-11 PROCEDURE — 99231 SBSQ HOSP IP/OBS SF/LOW 25: CPT | Mod: ,,, | Performed by: INTERNAL MEDICINE

## 2017-10-11 PROCEDURE — 85610 PROTHROMBIN TIME: CPT

## 2017-10-11 PROCEDURE — 85025 COMPLETE CBC W/AUTO DIFF WBC: CPT

## 2017-10-11 PROCEDURE — 97110 THERAPEUTIC EXERCISES: CPT

## 2017-10-11 PROCEDURE — 84134 ASSAY OF PREALBUMIN: CPT

## 2017-10-11 PROCEDURE — 80053 COMPREHEN METABOLIC PANEL: CPT

## 2017-10-11 PROCEDURE — 84100 ASSAY OF PHOSPHORUS: CPT

## 2017-10-11 PROCEDURE — 25000003 PHARM REV CODE 250: Performed by: HOSPITALIST

## 2017-10-11 PROCEDURE — 63600175 PHARM REV CODE 636 W HCPCS: Performed by: INTERNAL MEDICINE

## 2017-10-11 PROCEDURE — 97803 MED NUTRITION INDIV SUBSEQ: CPT | Performed by: DIETITIAN, REGISTERED

## 2017-10-11 PROCEDURE — 86703 HIV-1/HIV-2 1 RESULT ANTBDY: CPT | Mod: NTX

## 2017-10-11 PROCEDURE — 86900 BLOOD TYPING SEROLOGIC ABO: CPT

## 2017-10-11 PROCEDURE — 99233 SBSQ HOSP IP/OBS HIGH 50: CPT | Mod: ,,, | Performed by: INTERNAL MEDICINE

## 2017-10-11 PROCEDURE — 97116 GAIT TRAINING THERAPY: CPT

## 2017-10-11 PROCEDURE — 3E0234Z INTRODUCTION OF SERUM, TOXOID AND VACCINE INTO MUSCLE, PERCUTANEOUS APPROACH: ICD-10-PCS | Performed by: INTERNAL MEDICINE

## 2017-10-11 PROCEDURE — 86803 HEPATITIS C AB TEST: CPT

## 2017-10-11 PROCEDURE — 87340 HEPATITIS B SURFACE AG IA: CPT

## 2017-10-11 PROCEDURE — 97535 SELF CARE MNGMENT TRAINING: CPT

## 2017-10-11 PROCEDURE — 25000003 PHARM REV CODE 250: Performed by: INTERNAL MEDICINE

## 2017-10-11 PROCEDURE — 90472 IMMUNIZATION ADMIN EACH ADD: CPT | Performed by: INTERNAL MEDICINE

## 2017-10-11 PROCEDURE — 36415 COLL VENOUS BLD VENIPUNCTURE: CPT

## 2017-10-11 RX ORDER — FUROSEMIDE 10 MG/ML
80 INJECTION INTRAMUSCULAR; INTRAVENOUS ONCE
Status: COMPLETED | OUTPATIENT
Start: 2017-10-11 | End: 2017-10-11

## 2017-10-11 RX ORDER — LACTULOSE 10 G/15ML
30 SOLUTION ORAL EVERY 6 HOURS
Status: DISCONTINUED | OUTPATIENT
Start: 2017-10-11 | End: 2017-10-13

## 2017-10-11 RX ADMIN — HEPARIN SODIUM 5000 UNITS: 5000 INJECTION, SOLUTION INTRAVENOUS; SUBCUTANEOUS at 02:10

## 2017-10-11 RX ADMIN — HEPARIN SODIUM 5000 UNITS: 5000 INJECTION, SOLUTION INTRAVENOUS; SUBCUTANEOUS at 09:10

## 2017-10-11 RX ADMIN — SODIUM BICARBONATE 650 MG TABLET 1300 MG: at 02:10

## 2017-10-11 RX ADMIN — PANTOPRAZOLE SODIUM 40 MG: 40 TABLET, DELAYED RELEASE ORAL at 05:10

## 2017-10-11 RX ADMIN — LACTULOSE 30 G: 20 SOLUTION ORAL at 12:10

## 2017-10-11 RX ADMIN — RIFAXIMIN 550 MG: 550 TABLET ORAL at 08:10

## 2017-10-11 RX ADMIN — LACTULOSE 30 G: 20 SOLUTION ORAL at 05:10

## 2017-10-11 RX ADMIN — RIFAXIMIN 550 MG: 550 TABLET ORAL at 09:10

## 2017-10-11 RX ADMIN — ALBUMIN (HUMAN) 25 G: 12.5 SOLUTION INTRAVENOUS at 09:10

## 2017-10-11 RX ADMIN — HEPATITIS A VACCINE 1440 UNITS: 720 INJECTION, SUSPENSION INTRAMUSCULAR at 05:10

## 2017-10-11 RX ADMIN — SODIUM BICARBONATE 650 MG TABLET 1300 MG: at 09:10

## 2017-10-11 RX ADMIN — LACTULOSE 20 G: 20 SOLUTION ORAL at 05:10

## 2017-10-11 RX ADMIN — LACTULOSE 30 G: 20 SOLUTION ORAL at 09:10

## 2017-10-11 RX ADMIN — ASCORBIC ACID, VITAMIN A PALMITATE, CHOLECALCIFEROL, THIAMINE HYDROCHLORIDE, RIBOFLAVIN-5 PHOSPHATE SODIUM, PYRIDOXINE HYDROCHLORIDE, NIACINAMIDE, DEXPANTHENOL, ALPHA-TOCOPHEROL ACETATE, VITAMIN K1, FOLIC ACID, BIOTIN, CYANOCOBALAMIN: 200; 3300; 200; 6; 3.6; 6; 40; 15; 10; 150; 600; 60; 5 INJECTION, SOLUTION INTRAVENOUS at 01:10

## 2017-10-11 RX ADMIN — HEPARIN SODIUM 5000 UNITS: 5000 INJECTION, SOLUTION INTRAVENOUS; SUBCUTANEOUS at 05:10

## 2017-10-11 RX ADMIN — ALBUMIN (HUMAN) 25 G: 12.5 SOLUTION INTRAVENOUS at 08:10

## 2017-10-11 RX ADMIN — CLOSTRIDIUM TETANI TOXOID ANTIGEN (FORMALDEHYDE INACTIVATED), CORYNEBACTERIUM DIPHTHERIAE TOXOID ANTIGEN (FORMALDEHYDE INACTIVATED), BORDETELLA PERTUSSIS TOXOID ANTIGEN (GLUTARALDEHYDE INACTIVATED), BORDETELLA PERTUSSIS FILAMENTOUS HEMAGGLUTININ ANTIGEN (FORMALDEHYDE INACTIVATED), BORDETELLA PERTUSSIS PERTACTIN ANTIGEN, AND BORDETELLA PERTUSSIS FIMBRIAE 2/3 ANTIGEN 0.5 ML: 5; 2; 2.5; 5; 3; 5 INJECTION, SUSPENSION INTRAMUSCULAR at 05:10

## 2017-10-11 RX ADMIN — SODIUM BICARBONATE 650 MG TABLET 1300 MG: at 05:10

## 2017-10-11 RX ADMIN — FUROSEMIDE 80 MG: 10 INJECTION, SOLUTION INTRAVENOUS at 02:10

## 2017-10-11 NOTE — PROGRESS NOTES
Dr. casey notified pt w/ temp 99.9F.  1 BM today.    Lactulose orders rescheduled.  No other new orders noted. Will continue to monior.

## 2017-10-11 NOTE — SUBJECTIVE & OBJECTIVE
Interval History: patient's mentation has improved today to AAO times 4; patient agrees to attend ABU rehab after transplant if he was to get one; will fluid restrict patient and give patient albumin today to help improve Na;   - patient has begun liver transplant work up inpatient; mother at the bedside    Review of Systems   Constitutional: Negative for chills, fatigue and fever.   HENT: Negative for sore throat and trouble swallowing.    Eyes: Negative for photophobia and visual disturbance.   Respiratory: Negative for cough and shortness of breath.    Cardiovascular: Negative for chest pain, palpitations and leg swelling.   Gastrointestinal: Positive for abdominal distention. Negative for abdominal pain, constipation, diarrhea, nausea and vomiting.   Endocrine: Negative for cold intolerance and heat intolerance.   Genitourinary: Negative for dysuria and frequency.   Musculoskeletal: Negative for arthralgias and myalgias.   Skin: Positive for color change. Negative for rash and wound.   Neurological: Negative for dizziness, syncope, weakness and light-headedness.   Psychiatric/Behavioral: Negative for confusion and hallucinations.   All other systems reviewed and are negative.    Objective:     Vital Signs (Most Recent):  Temp: 99.8 °F (37.7 °C) (10/10/17 1551)  Pulse: 109 (10/10/17 1900)  Resp: 18 (10/10/17 1551)  BP: (!) 120/59 (10/10/17 1551)  SpO2: 97 % (10/10/17 1551) Vital Signs (24h Range):  Temp:  [98.4 °F (36.9 °C)-99.8 °F (37.7 °C)] 99.8 °F (37.7 °C)  Pulse:  [106-113] 109  Resp:  [18] 18  SpO2:  [96 %-98 %] 97 %  BP: (110-132)/(58-64) 120/59     Weight: 83.6 kg (184 lb 4.9 oz)  Body mass index is 28.87 kg/m².    Intake/Output Summary (Last 24 hours) at 10/10/17 2218  Last data filed at 10/10/17 1700   Gross per 24 hour   Intake              880 ml   Output              620 ml   Net              260 ml      Physical Exam   Constitutional: He appears well-developed and well-nourished. He is cooperative.   Non-toxic appearance. No distress.   HENT:   Head: Normocephalic and atraumatic.   Mouth/Throat: Oropharynx is clear and moist.   Eyes: EOM are normal. Pupils are equal, round, and reactive to light. Scleral icterus is present.   Neck: Normal range of motion. Neck supple.   Cardiovascular: Regular rhythm, normal heart sounds and normal pulses.  Tachycardia present.    No murmur heard.  Sinus tachycardia   Pulmonary/Chest: Effort normal. No respiratory distress. He has decreased breath sounds in the right lower field and the left lower field. He has no wheezes. He has no rales.   Abdominal: Soft. Normal appearance. He exhibits distension. He exhibits no mass. Bowel sounds are decreased. There is no tenderness. There is no rebound and no guarding. No hernia.   Musculoskeletal: Normal range of motion. He exhibits no edema.   Lymphadenopathy:     He has no cervical adenopathy.   Neurological: He is alert. He is not disoriented.   Oriented to person and place today   Skin: Skin is warm and dry. He is not diaphoretic.   Old blister/ wart on right plantar surface of the foot near the big toe   Psychiatric: He has a normal mood and affect. His behavior is normal. His affect is not labile. His speech is delayed. Cognition and memory are not impaired.   Nursing note and vitals reviewed.      Significant Labs:   CBC:     Recent Labs  Lab 10/09/17  0436 10/10/17  0450   WBC 26.70* 24.13*   HGB 8.9* 9.0*   HCT 25.3* 25.7*   * 161     CMP:     Recent Labs  Lab 10/09/17  0436 10/10/17  0450   * 127*   K 4.7 4.4    103   CO2 13* 12*   * 110   BUN 49* 48*   CREATININE 1.3 1.4   CALCIUM 8.4* 8.4*   PROT 5.8* 5.5*   ALBUMIN 2.3* 2.2*   BILITOT 30.6* 29.8*   ALKPHOS 148* 147*   * 118*   ALT 59* 62*   ANIONGAP 11 12   EGFRNONAA >60.0 >60.0     Coagulation:     Recent Labs  Lab 10/10/17  0450   INR 1.5*     All pertinent labs within the past 24 hours have been reviewed.    Significant Imaging: CXR: I have  reviewed all pertinent results/findings within the past 24 hours and my personal findings are:  NG in appropriate position

## 2017-10-11 NOTE — PROGRESS NOTES
"[facility]  Hepatology  Progress Note    Patient Name: Jhonny Diana  MRN: [id number]  Admission Date: 9/27/2017  Hospital Length of Stay: 14 days  Attending Provider: Armand Carrasco MD   Primary Care Physician: Provider Notinsystem  Principal Problem:Alcoholic hepatitis with ascites    Subjective:     Transplant status: No    HPI: 28 yo male with hx of childhood asthma and heavy alcohol abuse (fifth of liquor daily since teenager, last drink 2.5 wks ago per mother) who was transferred to [facility] for higher level of care/liver transplant evaluation.    History obtained per chart review and discussion with mother: Patient initially presented to OSH for a 2 mo hx of progressively worsening abdominal pain with associated fatigue, jaundice, and abdominal distention with ~20# weight gain.     The patient's mother states he began having abdominal pain about 6 months ago.  States he underwent many tests, all of which were inconclusive.  He then began to have abdominal swelling about two months ago and was in&out of the local ER.  He was still actively drinking up until about 2.5 weeks ago according to the mother but she is unsure of this.  At the OSH the patient underwent an EGD which showed g1EV.  He was also diagnosed with "pancreatitis" based on an elevated lipase.  The patient was also placed on prednisone given concerns for alcoholic hepatitis.           Interval History: No acute events overnight. This morning patient is up to chair and speaking to the .    Current Facility-Administered Medications   Medication    albumin human 25% bottle 25 g    Amino acid 4.25% - dextrose 5% (CLINIMIX-E) solution with additives (1L provides 42.5 gm AA, 50 gm CHO (170 kcal/L dextrose), Na 35, K 30, Mg 5, Ca 4.5, Acetate 70, Cl 39, Phos 15)    heparin (porcine) injection 5,000 Units    hepatitis A virus vaccine injection 1,440 Units    hepatitis B virus vacc.rec(PF) injection 40 mcg    influenza " (FLUZONE HIGH-DOSE) vaccine 0.5 mL    lactulose 20 gram/30 mL solution Soln 30 g    ondansetron injection 4 mg    pantoprazole EC tablet 40 mg    pneumoc 13-arianne conj-dip cr(PF) 0.5 mL    rifAXIMin tablet 550 mg    sodium bicarbonate tablet 1,300 mg    tramadol tablet 50 mg       Objective:     Vital Signs (Most Recent):  Temp: 99.3 °F (37.4 °C) (10/11/17 1144)  Pulse: 109 (10/11/17 1144)  Resp: 18 (10/11/17 1144)  BP: (!) 115/57 (10/11/17 1144)  SpO2: 96 % (10/11/17 1144) Vital Signs (24h Range):  Temp:  [98.3 °F (36.8 °C)-99.8 °F (37.7 °C)] 99.3 °F (37.4 °C)  Pulse:  [102-116] 109  Resp:  [18-19] 18  SpO2:  [96 %-99 %] 96 %  BP: (115-134)/(57-67) 115/57     Weight: 82.3 kg (181 lb 7 oz) (10/11/17 0500)  Body mass index is 28.42 kg/m².    Physical Exam   Constitutional: He is oriented to person, place, and time. No distress.   HENT:   Head: Normocephalic.   Eyes: Pupils are equal, round, and reactive to light. Scleral icterus is present.   Neck: Normal range of motion.   Cardiovascular:   Sinus tachycardia     Pulmonary/Chest: Effort normal and breath sounds normal. No respiratory distress. He has no wheezes. He has no rales. He exhibits no tenderness.   Abdominal: Bowel sounds are normal. He exhibits distension. He exhibits no mass. There is tenderness. There is no rebound and no guarding. No hernia.   Musculoskeletal: Normal range of motion. He exhibits edema.   Neurological: He is alert and oriented to person, place, and time.   Skin: He is not diaphoretic.       MELD-Na score: 30 at 10/11/2017  4:40 AM  MELD score: 27 at 10/11/2017  4:40 AM  Calculated from:  Serum Creatinine: 1.5 mg/dL at 10/11/2017  4:40 AM  Serum Sodium: 129 mmol/L at 10/11/2017  4:40 AM  Total Bilirubin: 29.4 mg/dL at 10/11/2017  4:40 AM  INR(ratio): 1.4 at 10/11/2017  4:40 AM  Age: 27 years    Significant Labs:  CBC:   Recent Labs  Lab 10/11/17  0440   WBC 20.79*   RBC 2.17*   HGB 8.3*   HCT 23.1*        CMP:   Recent  Labs  Lab 10/11/17  0440      CALCIUM 8.4*   ALBUMIN 2.5*   PROT 5.6*   *   K 4.1   CO2 14*      BUN 46*   CREATININE 1.5*   ALKPHOS 143*   ALT 61*   *   BILITOT 29.4*     Coagulation:   Recent Labs  Lab 10/11/17  0440   INR 1.4*       Significant Imaging:  No recent imaging studies to review    Assessment/Plan:     Decompensated hepatic cirrhosis with ascites and HE    27 year old male with a history of alcohol abuse transferred from [facility] for higher level of care. On admission patient came in with severe AH. In the time that he has been here he has made a significant recovery. Based on psych eval earlier this week we have started an inpatient liver transplant evaluation.     Recommendations:  - Continue high protein diet  - Continue Lactulose (can be titrated to ~ 3-5 BM per day) and Rifaximin  - Recommend therapeutic paracentesis in the AM  - Continue daily PT/OT  - ID and Dietary eval for his liver transplant evaluation          Hyponatremia    Hyponatremia mildly improving this morning after he was given albumin yesterday.    Recommendations:  - Continue to restrict to 1L              Thank you for your consult. I will follow-up with patient. Please contact us if you have any additional questions.    Divya Booker M.D.  Gastroenterology Fellow, PGY-IV  Pager: [phone]  [facility]

## 2017-10-11 NOTE — PLAN OF CARE
Problem: Physical Therapy Goal  Goal: Physical Therapy Goal  Goals to be met by: 10/16/2017    Patient will increase functional independence with mobility by performin. Supine to sit with MInimal Assistance Met 10/9/17   1A. supine to sit with SBA- Met   1B. Mod I   2. Sit to supine with MInimal Assistance  3. Sit to stand transfer with Minimal Assistance - met 10/5   3A: Sit to stand transfer with SBA using LRAD or no AD. Met 10/11/17  4. Bed to chair transfer with Moderate Assistance using LRAD or no AD.- Met   4A. SBA using LRAD  5. Gait  x 20 feet with Moderate Assistance using LRAD or no Ad. Met 10/9/17   5A: Gait x150 feet with SBA using LRAD or no AD. Met 10/11/2017 with RW  6. Ascend/descend 4 stair with bilateral Handrails Moderate Assistance using LRAD or no AD.  7. Stand for 8 minutes with Minimal Assistance using LRAD or no AD.  8. Lower extremity exercise program x20 reps per handout, with independence        Outcome: Ongoing (interventions implemented as appropriate)  Met two goals today (transfer and gait).

## 2017-10-11 NOTE — CONSULTS
Consult received for transplant evaluation. RD following patient for transplant work up. Please re-consult once transplanted.

## 2017-10-11 NOTE — PROGRESS NOTES
[facility]  Adult Nutrition  Progress Note    SUMMARY     Recommendations    Recommendation/Intervention: Oral intake has not improved since TF discontinued x 2 days.     1. If possible, recommend resuming enteral feeding of Peptamen with Prebio at goal rate of 60 mL/hr to meet 100% pt's estimated needs. This will provide 2160 kcal, 98g protein, and 1109 mL free fluid.     2. If parenteral nutrition to be continued longer than 24 hours and central line placement possible, recommend modifying to custom TPN of 7% AA/24% dex @ goal rate of 60 mL/hr plus IVLE. This will provide 101 g protein, 2079 kcal, and 1440 mL fluid.     3. If no central line, recommend PPN of Clinimix 4.25/10 at goal rate of 100 mL/hr plus IVLE daily. This will provide 102 g protein, 1663 kcal, and 2400 mL fluid.     Goals: Meet >80% of EEN/EPN  Nutrition Goal Status: goal not met  Communication of RD Recs: reviewed with MD    Reason for Assessment    Reason for Assessment: RD follow-up  Diagnosis: liver disease (decompensated EtOH hepatitis )  Relevent Medical History: none significant aside from EtOH abuse   Interdisciplinary Rounds: attended     General Information Comments: Pt reported drinking a Boost Plus/banana popsicle smoothie at 9PM (10/10) which he was able to keep down but it made him very nauseous, pt has not eaten since then; says he feels like there is no room for food or liquid because of the pressure in his abdomen; requesting paracentesis to relieve pressure and possibly enhance appetite; noted PPN ordered at this time, spoke with MD yesterday who agreed EN would be restarted if oral intake did not improve    Nutrition Discharge Planning: Adequate nutrition     Nutrition Prescription Ordered    Current Diet Order: High protein, high calorie  Nutrition Order Comments: -  Current Nutrition Support Formula Ordered: Clinimix 4.25/5 (ordered not yet started)  Current Nutrition Support Rate Ordered: 50 (ml)  Current  "Nutrition Support Frequency Ordered: mL/hr  Oral Nutrition Supplement: Boost Plus (strawberry)     Evaluation of Received Nutrients/Fluid Intake    Enteral Calories (kcal): 0  Enteral Protein (gm): 0  Enteral (Free Water) Fluid (mL): 0     Parenteral Calories (kcal): 408  Parenteral Protein (gm): 51  Parenteral Fluid (mL): 1200    Energy Calories Required: not meeting needs  % Kcal Needs: 19      Protein Required: not meeting needs  % Protein Needs: 42     IV Fluid (mL): 0     GIR (Glucose Infusion Rate) (mg/kg/min): 0.51 mg/kg/min     I/O: +88 mL x 24 hrs       Fluid Required: not meeting needs  Comments: LBM 10/10  Tolerance: tolerating  % Intake of Estimated Energy Needs: 0 - 25 %  % Meal Intake: 25%     Nutrition Risk Screen     Nutrition Risk Screen: dysphagia or difficulty swallowing    Nutrition/Diet History    Patient Reported Diet/Restrictions/Preferences: low salt  Typical Food/Fluid Intake: Very poor intake for 2-3 weeks PTA  Food Preferences: No Mormonism or cultural food preferences noted        Factors Affecting Nutritional Intake: abdominal pain, abdominal distention, decreased appetite, nausea/vomiting, altered gastrointestinal function   Labs/Tests/Procedures/Meds       Pertinent Labs Reviewed: reviewed  Pertinent Labs Comments: Na 129; BUN 46; Crt 1.5; Ca 8.4; Alb 2.5; Tbili 29.4; ; ALT 61; Ammonia 105;  Pertinent Medications Reviewed: reviewed  Pertinent Medications Comments: heparin, lactulose, pantoprazole, sodium bicarb    Physical Findings    Overall Physical Appearance: edematous  Tubes:  (none)  Oral/Mouth Cavity:  (blister (lower lip))  Skin: jaundice, intact    Anthropometrics    Temp: 99.3 °F (37.4 °C)     Height: 5' 7" (170.2 cm)  Weight Method: Bed Scale  Weight: 82.3 kg (181 lb 7 oz)  Ideal Body Weight (IBW), Male: 148 lb     % Ideal Body Weight, Male (lb): 124.24 lb     BMI (Calculated): 28.9  BMI Grade: 25 - 29.9 - overweight     Usual Body Weight (UBW), kg:  (2/2 fluid " shifts)      Estimated/Assessed Needs    Weight Used For Calorie Calculations: 83.4 kg (183 lb 13.8 oz)      Energy Calorie Requirements (kcal): 5259-8558 kcal/d  Energy Need Method: [place]-St Jeor        RMR ([place]-St. Jeor Equation): 1762.62      Weight Used For Protein Calculations: 83.4 kg (183 lb 13.8 oz)  Protein Requirements:  g/d     Fluid Need Method: RDA Method (1ml/kcal or per MD)      RDA Method (mL): 2114             Assessment and Plan    No new Assessment & Plan notes have been filed under this hospital service since the last note was generated.  Service: Nutrition    Decompensated hepatic cirrhosis with ascites and HE     Nutrition Problem  Inadequate energy intake     Related to (etiology):   TF d/c with poor oral intake, and insufficient PPN provision     Signs and Symptoms (as evidenced by):  Meeting 25% EEN and EPN     Interventions/Recommendations (treatment strategy):  See recs     Nutrition Diagnosis Status:   New       Monitor and Evaluation    Food and Nutrient Intake: energy intake, food and beverage intake, enteral nutrition intake  Food and Nutrient Adminstration: diet order, enteral and parenteral nutrition administration     Physical Activity and Function: nutrition-related ADLs and IADLs  Anthropometric Measurements: weight, weight change, body mass index  Biochemical Data, Medical Tests and Procedures: inflammatory profile, lipid profile, glucose/endocrine profile, gastrointestinal profile, electrolyte and renal panel  Nutrition-Focused Physical Findings: overall appearance    Nutrition Risk    Level of Risk:  (RD f/u 2x wk)    Nutrition Follow-Up    RD Follow-up?: Yes

## 2017-10-11 NOTE — ASSESSMENT & PLAN NOTE
26yo man w/a history of childhood asthma and alcohol abuse (fifth whiskey daily >10yrs, active through admission) who was admitted on 9/27/2017 as a transfer from an OSH (first admitted 9/6) with 6-8wk progressively worsening fatigue, jaundice, abdominal pain, distension, BLE edema, and 20lb weight gain due to decompensated liver failure and recently diagnosed alcoholic cirrhosis (c/b HE, EV, portal HTN, scant ascites, no SBP, HRS).      Extensive infectious work-up with no localizing source - besides stigmata of liver failure, patient with no localizing signs or symptoms, blood / urine cultures negative, ascites fluid not consistent with SBP. CT chest with groundglass opacities, sputum with ESBL, however, patient is not complaining of any pulmonary symptoms. Suspect leukocytosis in setting of alcoholic hepatitis superimposed on cirrhosis.    - Leukocytosis in setting of alcoholic hepatitis superimposed on cirrhosis - no evidence of active infection so no infectious contraindication to transplant    - Would not treat ESBL Klebsiella in sputum sample - coughing spell initiated after pulling NG tube, but resolved within a day.     - Transplant evaluation: quant gold indeterminant, T-spot sent today - if positive will need to treat with 9 months of isoniazid post-transplant    - Vaccines ordered today: HAV, HBV high dose, prevnar, influenza, Tdap  - Will need HAV in 6 months, high-dose HBV in 1, 6 months, pneumovax in 2 months

## 2017-10-11 NOTE — ASSESSMENT & PLAN NOTE
Hyponatremia mildly improving this morning after he was given albumin yesterday.    Recommendations:  - Continue to restrict to 1L

## 2017-10-11 NOTE — PT/OT/SLP PROGRESS
"Physical Therapy  Treatment    Jhonny Diana   MRN: [id number]   Admitting Diagnosis: Alcoholic hepatitis with ascites    PT Received On: 10/11/17  PT Start Time: 0947     PT Stop Time: 1019    PT Total Time (min): 32 min       Billable Minutes:  Gait [id number] and Therapeutic Exercise 17    Treatment Type: Treatment  PT/PTA: PT     PTA Visit Number: 0       General Precautions: Standard, fall, contact (ESBL--added 10/9)  Orthopedic Precautions: N/A   Braces: N/A    Do you have any cultural, spiritual, Orthodoxy conflicts, given your current situation?: none reported     Subjective:  Communicated with nurse prior to session. Pt stated, "I just wish the fluid they removed (via thoracentesis) would just go away; but it came right back."        Pain/Comfort  Pain Rating 1: 0/10    Objective:        Functional Mobility:  Bed Mobility:   Supine to Sit: Minimum Assistance (for mgmt of LEs (education on proper technique))    Transfers:  Sit <> Stand Assistance: Supervision (from edge of bed and bedside chair)  Sit <> Stand Assistive Device: Rolling Walker    Gait:   Gait Distance: 200 feet with SBA and use of rolling walker. Verbal cueing for upright posture; slow pace.  Assistance 1: Stand by Assistance  Gait Assistive Device: Rolling walker  Gait Pattern: reciprocal  Gait Deviation(s): increased stride width, decreased saskia, increased time in double stance    Stairs:  Did not attempt today.    Balance:   Static Sit: NORMAL: No deviations seen in posture held statically  Dynamic Sit: GOOD: Maintains balance through MODERATE excursions of active trunk movement  Static Stand: FAIR+: Takes MINIMAL challenges from all directions  Dynamic stand: FAIR+: Needs CLOSE SUPERVISION during gait and is able to right self with minor LOB     Therapeutic Activities and Exercises:  Pt was able to complete the following standing exercises:   Hip flexion (1 set of 10 repetitions with BUE support on RW; 1 set of 10 repetitions with " unilateral UE support on RW)  Heel raises (2x10 reps with RW use)     Pt displayed dyspnea with standing exercises.   Pt was educated on the importance of completing his home exercise program throughout the day to alleviate edema and strengthen lower extremities.    AM-PAC 6 CLICK MOBILITY  How much help from another person does this patient currently need?   1 = Unable, Total/Dependent Assistance  2 = A lot, Maximum/Moderate Assistance  3 = A little, Minimum/Contact Guard/Supervision  4 = None, Modified [place]/Independent    Turning over in bed (including adjusting bedclothes, sheets and blankets)?: 4  Sitting down on and standing up from a chair with arms (e.g., wheelchair, bedside commode, etc.): 4  Moving from lying on back to sitting on the side of the bed?: 3  Moving to and from a bed to a chair (including a wheelchair)?: 3  Need to walk in hospital room?: 4  Climbing 3-5 steps with a railing?: 3  Total Score: 21    AM-PAC Raw Score CMS G-Code Modifier Level of Impairment Assistance   6 % Total / Unable   7 - 9 CM 80 - 100% Maximal Assist   10 - 14 CL 60 - 80% Moderate Assist   15 - 19 CK 40 - 60% Moderate Assist   20 - 22 CJ 20 - 40% Minimal Assist   23 CI 1-20% SBA / CGA   24 CH 0% Independent/ Mod I     Patient left up in chair with call button in reach and mother present.    Assessment:  Jhonny Diana is a 27 y.o. male with a medical diagnosis of Alcoholic hepatitis with ascites and presents with impaired balance and endurance.  Pt ambulated further today than noted last session with use of a rolling walker with stand-by assistance required.  He displayed dyspnea on exertion throughout his treatment session today.  He will benefit from further PT services via IP rehab facility to ensure that he returns to his prior level of function, which was independence.    Rehab identified problem list/impairments: Rehab identified problem list/impairments: weakness, impaired endurance, gait instability,  impaired functional mobilty, impaired balance, decreased lower extremity function, pain, edema    Rehab potential is fair.    Activity tolerance: Fair    Discharge recommendations: Discharge Facility/Level Of Care Needs: rehabilitation facility     Barriers to discharge:   none    Equipment recommendations:   To be determined.    GOALS:    Physical Therapy Goals        Problem: Physical Therapy Goal    Goal Priority Disciplines Outcome Goal Variances Interventions   Physical Therapy Goal     PT/OT, PT Ongoing (interventions implemented as appropriate)     Description:  Goals to be met by: 10/16/2017    Patient will increase functional independence with mobility by performin. Supine to sit with MInimal Assistance Met 10/9/17   1A. supine to sit with SBA- Met   1B. Mod I   2. Sit to supine with MInimal Assistance  3. Sit to stand transfer with Minimal Assistance - met 10/5   3A: Sit to stand transfer with SBA using LRAD or no AD. Met 10/11/17  4. Bed to chair transfer with Moderate Assistance using LRAD or no AD.- Met   4A. SBA using LRAD  5. Gait  x 20 feet with Moderate Assistance using LRAD or no Ad. Met 10/9/17   5A: Gait x150 feet with SBA using LRAD or no AD. Met 10/11/2017 with RW  6. Ascend/descend 4 stair with bilateral Handrails Moderate Assistance using LRAD or no AD.  7. Stand for 8 minutes with Minimal Assistance using LRAD or no AD.  8. Lower extremity exercise program x20 reps per handout, with independence                          PLAN:    Patient to be seen 4 x/week  to address the above listed problems via gait training, therapeutic activities, therapeutic exercises, neuromuscular re-education  Plan of Care expires: 17  Plan of Care reviewed with: patient         Nany Sharif, PT  10/11/2017

## 2017-10-11 NOTE — ASSESSMENT & PLAN NOTE
27 year old male with a history of alcohol abuse transferred from [facility] for higher level of care. On admission patient came in with severe AH. In the time that he has been here he has made a significant recovery. Based on psych eval earlier this week we have started an inpatient liver transplant evaluation.     Recommendations:  - Continue high protein diet  - Continue Lactulose (can be titrated to ~ 3-5 BM per day) and Rifaximin  - Continue daily PT/OT

## 2017-10-11 NOTE — PROGRESS NOTES
SW Consult:    SW presented to pt's room to complete psychosocial assessment. Pt sleeping at this time. Pt's mother reported he would be more clear and awake in the morning as this has been his patterns. Pt's mother presented as alert, oriented x4, pleasant, and cooperative. Pt's mother agreed to meet with SW and pt around 10 AM tomorrow (10/11). SW agreed. No other needs identified at this time. SW remains available.

## 2017-10-11 NOTE — PT/OT/SLP PROGRESS
"Occupational Therapy  Treatment    Jhonny Diana   MRN: [id number]   Admitting Diagnosis: Alcoholic hepatitis with ascites    OT Date of Treatment: 10/11/17   OT Start Time: 1420  OT Stop Time: 1449  OT Total Time (min): 29 min    Billable Minutes:  Self Care/Home Management 29    General Precautions: Standard, fall, contact  Orthopedic Precautions:    Braces:           Subjective:  Communicated with RN prior to session.  "I don't think I can do more exercises today. I'll hold off for tomorrow."    Pain/Comfort  Pain Rating 1: 0/10 ("tightness" and "pressure" but no pain)  Pain Rating Post-Intervention 1: 0/10    Objective:  Patient found with: telemetry     Functional Mobility:  Bed Mobility:   Rolling: CGA to L with HOB elevated   Supine>Sit: CGA with HOB elevated and rails utilized   Sit>Supine: Min A<>CGA for postural support with HOB flat and hand rail utilized   Scooting/Bridging: Sup to EOB, HOB    Transfers:   Sit>Stand: CGA from EOB with RW   Stand>Sit: SBA to EOB with RW   Toilet: SBA to sit, Mod (I) to stand with RW and grab bar    Pt performed functional mobility ~15ft with CGA and RW.      Activities of Daily Living:  Feeding: [place] drink from cup with straw seated EOB.  LE Dressing: Contact Guard Assistance doff/don socks seated EOB with increased time, effort and x3 rest breaks.  Grooming: Stand-by Assistance to wash hands while standing at sink.  Toileting: Supervision for clothing management and elton care/hygiene post voiding and BM from toilet      Balance:  Static Sitting:           good  Dynamic Sitting:      good  Static Standing:       fair+  Dynamic Standing:  fair    Therapeutic Activities and Exercises:  Pt educated on safety with daily tasks OOB, and importance of participating in daily ax. Pt whiteboard updated.      AM-PAC 6 CLICK ADL   How much help from another person does this patient currently need?   1 = Unable, Total/Dependent Assistance  2 = A lot, Maximum/Moderate Assistance  3 " "= A little, Minimum/Contact Guard/Supervision  4 = None, Modified [place]/Independent    Putting on and taking off regular lower body clothing? : 3  Bathing (including washing, rinsing, drying)?: 2  Toileting, which includes using toilet, bedpan, or urinal? : 4  Putting on and taking off regular upper body clothing?: 3  Taking care of personal grooming such as brushing teeth?: 3  Eating meals?: 4  Total Score: 19     AM-PAC Raw Score CMS "G-Code Modifier Level of Impairment Assistance   6 % Total / Unable   7 - 8 CM 80 - 100% Maximal Assist   9-13 CL 60 - 80% Moderate Assist   14 - 19 CK 40 - 60% Moderate Assist   20 - 22 CJ 20 - 40% Minimal Assist   23 CI 1-20% SBA / CGA   24 CH 0% Independent/ Mod I       Patient left supine with all lines intact, call button in reach and RN present and notified of session    ASSESSMENT:  Jhonny Diana is a 27 y.o. male with a medical diagnosis of Alcoholic hepatitis with ascites. Pt tolerated session well and put forth good effort to participate. Pt presented with decreased (I), endurance, stability and safety for ADLs, self-care and functional mobility. Pt progressing with OOB ax and tolerance with daily care. Pt will benefit from further OT in order to maximize (I) and safety for functional tasks.      Rehab identified problem list/impairments: Rehab identified problem list/impairments: weakness, impaired functional mobilty, gait instability, impaired endurance, impaired balance, impaired self care skills    Rehab potential is good.    Activity tolerance: Good    Discharge recommendations: Discharge Facility/Level Of Care Needs: rehabilitation facility     Barriers to discharge: Barriers to Discharge: Inaccessible home environment    Equipment recommendations:  (TBD)     GOALS:    Occupational Therapy Goals        Problem: Occupational Therapy Goal    Goal Priority Disciplines Outcome Interventions   Occupational Therapy Goal     OT, PT/OT Ongoing (interventions " implemented as appropriate)    Description:  Goals to be met by: 10/17/2017    Patient will increase functional independence with ADLs by performing:    UE Dressing with Supervision.  LE Dressing with Supervision.  Grooming while seated with Supervision.  Toileting from bedside commode with Supervision for hygiene and clothing management.  MET 10/11 with satisfactory achievement at toilet  Sitting at edge of bed or bedside chair  x30 minutes with Modified [place].  Rolling to Bilateral with Supervision.   Supine to sit with Supervision.  Stand pivot transfers with Supervision.  Toilet transfer to bedside commode with Supervision. DC 10/11  Updated: Toilet t/f with Supervision.                           Plan:  Patient to be seen 5 x/week to address the above listed problems via self-care/home management, community/work re-entry, therapeutic exercises, therapeutic activities  Plan of Care expires: 11/02/17  Plan of Care reviewed with: patient, mother         Anne POSADAS JULIETTE Cai  10/11/2017

## 2017-10-11 NOTE — PROGRESS NOTES
Dr. Carrasco notified pt c/o abdominal pain/fullness/pressure.  Requesting paracentesis to be done.  Pt also unable to eat or drink much due to abdominal distention and discomfort.      Per MD pt to be started on TPN for nutrition.  Paracentesis ordered for today.    Will continue to monitor.

## 2017-10-11 NOTE — ASSESSMENT & PLAN NOTE
· Mentation improving  · Continue Lactulose and Rifaxmin    10/10 - mentation AAO times 4; only one BM today; will schedule lactulose today

## 2017-10-11 NOTE — PROGRESS NOTES
"[facility] Hospital Medicine  Progress Note    Patient Name: Jhonny Diana  MRN: [id number]  Patient Class: IP- Inpatient   Admission Date: 9/27/2017  Length of Stay: 13 days  Attending Physician: Armand Carrasco MD  Primary Care Provider: Provider [facility] Medicine Team: [facility]OSP MED A Armand Carrasco MD    Subjective:     Principal Problem:Alcoholic hepatitis with ascites    HPI:  28 yo male with hx of childhood asthma and heavy alcohol abuse (fifth of liquor daily since teenager, last drink 2.5 wks ago per mother) who was transferred to [facility] for higher level of care/liver transplant evaluation. Patient initially presented to OSH for a 2 mo hx of progressively worsening abdominal pain with associated fatigue, jaundice, and abdominal distention with ~20# weight gain. HPI given by patient's mother and OSH records as patient's participation in interview limited by previous administration of pain medication at OSH.      Per mother, patient did not have a fever, chills, dark stools, known sick contacts, nausea, vomiting, diarrhea, confusion, or HA. Reports family hx of alcoholic cirrhosis in father. Pt was recently diagnosed with alcoholic hepatitis during his admission at OSH on 9/6-9/15. During that hospitalization, patient was given ceftriaxone for SBP ppx, prednisone for alcoholic hepatitis, and diuresed. Pt was discharged and instructed to follow up with PCP and AA within 1 wk. After his discharge, patient returned to ED 4 days after for increased weight gain and worsening abdominal pain/jaundice. At OSH, patient was given fluids (for suspected pancreatitis), diuresed and given IV albumin. Two days prior to transfer to [facility], patient had an episode of hematemesis and underwent an EGD on 9/25 that revealed "1 sm varix in distal esophagus, no stigmata of bleeding, gastropathy of portal hypertension, and reflux esophagitis".  No further episodes of hematemesis noted since initial episode. "      Recent labs at OSH: Na 134, CO2 18, BUN 36, Cr 2, Tprot 5.9, albumin 1.9, Tbili 24, alk phos 83, , ALT51. Patient also had lipase 532 and ammonia 118 during his previous hospitalization at OSH. Labwork also notable for negative mitochondrial ab, sm muscle ab, and ceruloplasmin 22. Imaging at OSH revealing abdo U/S (9/20): hepatosplenomegaly, CT abdo w contrast: hepatomegaly with marked fatty infiltration of the liver and mild ascites (9/6). No CDs available for review.    Hospital Course:  Mr. Diana was admitted to the CMICU on 9/27 for decompensated alcoholic hepatitis with high MELD score, severe HE, and LAURA. Hepatology consulted and following to evaluate liver transplant candidacy. Patient not currently a transplant candidate due to recent alcohol use; recommending re-evaluation by addiction psych and social work when more medically stable and able to participate. Pt started on albumin and lasix for LAURA and possibility for HRS. However nephrology felt that LAURA is likely multifactorial: ATN given hypotensive episodes at OSH and toxic given severe hyperbilirubinemia; not truly HRS. Lactulose/rifaximin started for HE and mental status improving slowly. Mr. Diana has had significant ABD pain since admission with ABD distension. CT abdomen was significant for pericolonic fat stranding of right colon; no obstruction. Also with diffuse mesenteric edema. No significant ascites noted, no cholecystitis/cholelithiasis/ductal dilation. US liver with doppler showed appropriate vasculature.  He was stepped down to Hospital Medicine on 10/5.  Paracentesis was done on 10/9 with 3.3L removed.  His diet was advanced and Dietary was consulted to help get adequate caloric intake.    Interval History: patient's mentation has improved today to AAO times 4; patient agrees to attend ABU rehab after transplant if he was to get one; will fluid restrict patient and give patient albumin today to help improve Na;   - patient has  begun liver transplant work up inpatient; mother at the bedside    Review of Systems   Constitutional: Negative for chills, fatigue and fever.   HENT: Negative for sore throat and trouble swallowing.    Eyes: Negative for photophobia and visual disturbance.   Respiratory: Negative for cough and shortness of breath.    Cardiovascular: Negative for chest pain, palpitations and leg swelling.   Gastrointestinal: Positive for abdominal distention. Negative for abdominal pain, constipation, diarrhea, nausea and vomiting.   Endocrine: Negative for cold intolerance and heat intolerance.   Genitourinary: Negative for dysuria and frequency.   Musculoskeletal: Negative for arthralgias and myalgias.   Skin: Positive for color change. Negative for rash and wound.   Neurological: Negative for dizziness, syncope, weakness and light-headedness.   Psychiatric/Behavioral: Negative for confusion and hallucinations.   All other systems reviewed and are negative.    Objective:     Vital Signs (Most Recent):  Temp: 99.8 °F (37.7 °C) (10/10/17 1551)  Pulse: 109 (10/10/17 1900)  Resp: 18 (10/10/17 1551)  BP: (!) 120/59 (10/10/17 1551)  SpO2: 97 % (10/10/17 1551) Vital Signs (24h Range):  Temp:  [98.4 °F (36.9 °C)-99.8 °F (37.7 °C)] 99.8 °F (37.7 °C)  Pulse:  [106-113] 109  Resp:  [18] 18  SpO2:  [96 %-98 %] 97 %  BP: (110-132)/(58-64) 120/59     Weight: 83.6 kg (184 lb 4.9 oz)  Body mass index is 28.87 kg/m².    Intake/Output Summary (Last 24 hours) at 10/10/17 0334  Last data filed at 10/10/17 1700   Gross per 24 hour   Intake              880 ml   Output              620 ml   Net              260 ml      Physical Exam   Constitutional: He appears well-developed and well-nourished. He is cooperative.  Non-toxic appearance. No distress.   HENT:   Head: Normocephalic and atraumatic.   Mouth/Throat: Oropharynx is clear and moist.   Eyes: EOM are normal. Pupils are equal, round, and reactive to light. Scleral icterus is present.   Neck:  Normal range of motion. Neck supple.   Cardiovascular: Regular rhythm, normal heart sounds and normal pulses.  Tachycardia present.    No murmur heard.  Sinus tachycardia   Pulmonary/Chest: Effort normal. No respiratory distress. He has decreased breath sounds in the right lower field and the left lower field. He has no wheezes. He has no rales.   Abdominal: Soft. Normal appearance. He exhibits distension. He exhibits no mass. Bowel sounds are decreased. There is no tenderness. There is no rebound and no guarding. No hernia.   Musculoskeletal: Normal range of motion. He exhibits no edema.   Lymphadenopathy:     He has no cervical adenopathy.   Neurological: He is alert. He is not disoriented.   Oriented to person and place today   Skin: Skin is warm and dry. He is not diaphoretic.   Old blister/ wart on right plantar surface of the foot near the big toe   Psychiatric: He has a normal mood and affect. His behavior is normal. His affect is not labile. His speech is delayed. Cognition and memory are not impaired.   Nursing note and vitals reviewed.      Significant Labs:   CBC:     Recent Labs  Lab 10/09/17  0436 10/10/17  0450   WBC 26.70* 24.13*   HGB 8.9* 9.0*   HCT 25.3* 25.7*   * 161     CMP:     Recent Labs  Lab 10/09/17  0436 10/10/17  0450   * 127*   K 4.7 4.4    103   CO2 13* 12*   * 110   BUN 49* 48*   CREATININE 1.3 1.4   CALCIUM 8.4* 8.4*   PROT 5.8* 5.5*   ALBUMIN 2.3* 2.2*   BILITOT 30.6* 29.8*   ALKPHOS 148* 147*   * 118*   ALT 59* 62*   ANIONGAP 11 12   EGFRNONAA >60.0 >60.0     Coagulation:     Recent Labs  Lab 10/10/17  0450   INR 1.5*     All pertinent labs within the past 24 hours have been reviewed.    Significant Imaging: CXR: I have reviewed all pertinent results/findings within the past 24 hours and my personal findings are:  NG in appropriate position    Assessment/Plan:      * Alcoholic hepatitis with ascites    · Appreciate Hepatology assistance  · Continue  Lactulose and Rifaximin for HE  · Currently not transplant candidate per Psych and Hep.  Addiction Psych consulted  · IR para 10/9- 7.8 L fluid removed negative for SBP  · Stressed the importance of high calorie diet to prevent NGT insertion again - Boost TID  · Patient agreed to due alcohol rehab if patient was to get a liver transplant; we have initiated inpatient liver transplant evaluation; possible presentation on friday    MELD-Na score: 31 at 10/10/2017  4:50 AM  MELD score: 27 at 10/10/2017  4:50 AM  Calculated from:  Serum Creatinine: 1.4 mg/dL at 10/10/2017  4:50 AM  Serum Sodium: 127 mmol/L at 10/10/2017  4:50 AM  Total Bilirubin: 29.8 mg/dL at 10/10/2017  4:50 AM  INR(ratio): 1.5 at 10/10/2017  4:50 AM  Age: 27 years            Severe malnutrition    · SLP consulted and ok for dental soft diet  · Dietary consulted  · Stressed the importance of eating high protein high calorie to prevent NGT re-insertion with TF  · Calorie counts and Boost TID        Coagulopathy    · 2/2 liver disease  · No bleeding; Monitor          Leukocytosis    · WBC remains elevated - stable at 26  · Remains afebrile  · Repeat blood cultures NGTD  · UA negative  · Resp cx ESBL Klebsiella.  ID following.  Will discuss antibiotics        Metabolic acidosis with normal anion gap and bicarbonate losses    · 2/2 diarrhea given lactulose.  Currently on 10g daily with improving BMs  · Still acidotic, will monitor  · Sodium Bicarb TID          Decompensated hepatic cirrhosis with ascites and HE    See alcoholic hepatitis with ascites          Anasarca    · 2/2 liver disease and hypoalbuminemia          Hyponatremia    · Sodium 128 today, will fluid restrict to 1L and give albumin          History of hematemesis    · Pt had an episode of hematemesis at OSH and EGD that revealed 1 small varix, reflux esophagitis, and gastropathy of portal hypertension  · No evidence of bleeding currently and relatively stable hemgolobin  · Continue  Pantoprazole BID        Acute renal failure with tubular necrosis    · Appreciate Nephro's help  · Suspect LAURA multifactorial: ischemic ATN given hypotension and injury due to hyperbilirubinemia. Felt less likely to be HRS however have continued albumin with lasix  · Overall, LAURA improving and making adequate urine.  Not measuring amounts as he pulls off condom catheter and is in diapers - but mother reports frequent wet diapers          Hepatic encephalopathy    · Mentation improving  · Continue Lactulose and Rifaxmin    10/10 - mentation AAO times 4; only one BM today; will schedule lactulose today          Alcoholism /alcohol abuse    · Reported about a fifth of whiskey daily since teenager  · Appreciate Psych and Hep assistance; currently high risk for liver transplant.  Addiction Psych reconsulted, state prognosis is gaurded however would need ABU if transplant is need urgently which patient has agreed to do  · PETH 174 on admit, 9/27          VTE Risk Mitigation         Ordered     heparin (porcine) injection 5,000 Units  Every 8 hours     Route:  Subcutaneous        10/03/17 1740     Place sequential compression device  Until discontinued      09/28/17 0238     Medium Risk of VTE  Once      09/27/17 1347              Armand Carrasco MD  Department of Hospital Medicine   [facility]

## 2017-10-11 NOTE — PLAN OF CARE
Problem: Liver Failure, Acute/Chronic (Adult)  Intervention: Optimize/Manage Nutrition  Recommendations    Recommendation/Intervention: Oral intake has not improved since TF discontinued x 2 days.     1. If possible, recommend resuming enteral feeding of Peptamen with Prebio at goal rate of 60 mL/hr to meet 100% pt's estimated needs. This will provide 2160 kcal, 98g protein, and 1109 mL free fluid.     2. If parenteral nutrition to be continued longer than 24 hours and central line placement possible, recommend modifying to custom TPN of 7% AA/24% dex @ goal rate of 60 mL/hr plus IVLE. This will provide 101 g protein, 2079 kcal, and 1440 mL fluid.     3. If no central line, recommend PPN of Clinimix 4.25/10 at goal rate of 100 mL/hr plus IVLE daily. This will provide 102 g protein, 1663 kcal, and 2400 mL fluid.     Goals: Meet >80% of EEN/EPN  Nutrition Goal Status: goal not met  Communication of RD Recs: reviewed with MD

## 2017-10-11 NOTE — ASSESSMENT & PLAN NOTE
· Reported about a fifth of whiskey daily since teenager  · Appreciate Psych and Hep assistance; currently high risk for liver transplant.  Addiction Psych reconsulted, state prognosis is gaurded however would need ABU if transplant is need urgently which patient has agreed to do  · PETH 174 on admit, 9/27

## 2017-10-11 NOTE — PROGRESS NOTES
Transplant Recipient Adult Psychosocial Assessment    Jhonny Diana  [address]  [facility] [address]    Telephone Information:   [place] [phone]   Home  [phone] (home)  Work  There is no work phone number on file.  E-mail  No e-mail address on record    Sex: male  YOB: 1990  Age: 27 y.o.    Encounter Date: 2017  [place] Citizen: yes  Primary Language: English   Needed: no    Emergency Contact:  Name: Sanjuanita Diana  Relationship: mother  Phone Numbers:  [phone](mobile). This is patient's cell phone at pt's mother is using while she is with pt in the hospital. Pt has another phone that he is using (ph # is above)    Family/Social Support:   Number of dependents/: None  Marital history: Single. Never .   Other family dynamics: Pt's mother, Sanjuanita Diana (53) will be pt's primary caregiver. Pt's father has an extensive hx of alcoholism and is now . Pt's mother reported she and pt's father had been  for years before pt's father . Pt's mother reported they  due to pt's father's severe alcoholism. Pt reported he was not close with this father when he was alive. Pt reported he has 6 siblings, whom he has a close relationship with. Pt reported he has a good relationship with his roommate, Carley and stated that she is very supportive. Pt's mother reported she knows Carley and feels that she would be very supportive in pt's sobriety.     Household Composition:  Name: Carley Jaimerd- [phone]  Age: 22  Relationship: roomate  Does person drive? yes    Pt reported if he receives a transplant he is not sure if he will return to his home in [place], [place] with Carley or if he will live with his mother.     Do you and your caregivers have access to reliable transportation? yes  PRIMARY CAREGIVER: Sanjuanita Diana will be primary caregiver, phone number [phone]. Pt's mother reported she does not have a cell phone of her own and is borrowing  pt's cell phone at this time. Pt's mother reported she will continue to borrow pt's cell phone until pt has recovered.      provided in-depth information to patient and caregiver regarding pre- and post-transplant caregiver role.   strongly encourages patient and caregiver to have concrete plan regarding post-transplant care giving, including back-up caregiver(s) to ensure care giving needs are met as needed.    Patient and Caregiver states understanding all aspects of caregiver role/commitment and is able/willing/committed to being caregiver to the fullest extent necessary.    Patient and Caregiver verbalizes understanding of the education provided today and caregiver responsibilities.         remains available. Patient and Caregiver agree to contact  in a timely manner if concerns arise.      Able to take time off work without financial concerns: yes. Pt's mother works for her mother at an insurance company. Pt's mother reported she will not have an issue taking time off to care for pt.     Additional Significant Others who will Assist with Transplant:  Name: Miranda Diana- [phone]  Age: 22  Relationship: sister  Does person drive? yes   SW called to receive verbal confirmation of commitment as backup caregiver. Pt's sister not available. SW left .     Name: Sophy Aragon- [phone]  Age: 51  Relationship: aunt  Does person drive? yes   SW contacted pt's aunt and received verbal confirmation of commitment and understanding of backup caregiver role.     Living Will: no  Healthcare Power of : no  Advance Directives on file: <<no information> per medical record.  Verbally reviewed LW/HCPA information.   provided patient with copy of LW/HCPA documents and provided education on completion of forms.    Living Donors: No.    Highest Education Level: Attended College/Technical School. Pt completed 2 years of college  Reading Ability:  college  Reports difficulty with: N/A  Learns Best By:  Multi-modal instruction     Status: no  VA Benefits: no     Working for Income: No  If no, reason not working: Disability. Pt is receiving STD from employer. Pt reported he will receive STD for 26 weeks and then it will switch over to LTD.     Patient is disabled.  Prior to disability, patient  was employed as  at [employer]. Pt was working full time.     Spouse/Significant Other Employment: N/A    Disabled: yes: date disability began: 2017, due to: ESLD.    Monthly Income:  Salary/Wages: $2300. Pt reported he makes about 60% of his pay now that he is on STD and will make 80% of his pay when he switches to LTD.   Able to afford all costs now and if transplanted, including medications: yes  Patient and Caregiver verbalizes understanding of personal responsibilities related to transplant costs and the importance of having a financial plan to ensure that patients transplant costs are fully covered.      provided fundraising information/education.  Patient and Caregiver verbalizes understanding.   remains available.    Insurance:   Payor/Plan Subscr  Sex Relation Sub. Ins. ID Effective Group Num   1. [facility]* RAFFY THOMAS 1990 Male  [id number]                                   [address]   2. [employer] [facility]* ARFFY THOMAS 1990 Male  [id number] Not Eff [id number]                                   [address] [address]     Primary Insurance (for UNOS reporting): Private Insurance  Secondary Insurance (for UNOS reporting): Private Insurance  Patient and Caregiver verbalizes clear understanding that patient may experience difficulty obtaining and/or be denied insurance coverage post-surgery. This includes and is not limited to disability insurance, life insurance, health insurance, burial insurance, long term care insurance, and other insurances.    Patient and Caregiver also reports understanding  that future health concerns related to or unrelated to transplantation may not be covered by patient's insurance.  Resources and information provided and reviewed.      Patient and Caregiver provides verbal permission to release any necessary information to outside resources for patient care and discharge planning.  Resources and information provided are reviewed.      Dialysis Adherence: Pt denied hx of HD  Infusion Service: patient utilizing? no  Home Health: patient utilizing? no  DME: no  Pulmonary/Cardiac Rehab: no   ADLS:  Pt reported he was fully independent, working full time, and driving prior to his admission to the hospital. Pt reported his health started failing quickly and it was unexpected.     Adherence: Pt reported he only recently started going to the doctor about 2 months ago when he started feeling poorly. Pt reported he did not go to the doctor regularly prior to that because he was not sick. Pt reported since he has been attending medical appointments he has adhered to all medical advice provided by the physicians. Pt reported he had been going to the ED to receive medical advice and does not have a PCP. Adherence education and counseling provided.     Per History Section:Past Medical History:   Diagnosis Date    Hypertension      Social History   Substance Use Topics    Smoking status: Former Smoker     Types: Cigarettes    Smokeless tobacco: Never Used    Alcohol use Yes      Comment: a fifth of liquor daily for years, cut back over last 2 months     History   Drug Use No     History   Sexual Activity    Sexual activity: Not on file       Per Today's Psychosocial:  Tobacco: none, patient denies any use.  Alcohol: none, patient denies any use. Pt reported he was drinking heavily up until about 2 months ago. Pt reported prior to that he was drinking at least one fifth of whisky daily from 8565-4218. Pt reported in 2016 he started an attempt to stop drinking by tapering down his alcohol  "intake. Pt reported he tapered down to 4-5 "stiff" drinks of whiskey per day. Pt reported by the beginning of 2017 he had tapered down to drinking one glass of wine daily with intention to stop completely. Pt reported he knew his drinking was problematic but was unable to stop completely due to experiencing significant symptoms of withdrawal including headaches, tremors, and feeling physically ill. Pt reported he was able to successfully stop drinking in the beginning of August 2017 and was able to stay sober on his own for one month. In September 2017 he was hospitalized twice due to sxs of acute liver failure. Pt reported he knew he was sick but was unaware that he was in liver failure until his first hospitalization in September 2017 at [facility] in [place].     Pt reported his alcohol intake increased in 2014 when he moved to [place], [place] and got a job at a [employer]. Pt reported it was stressful and he felt that he was conning people, which he stated he felt guilty about. Pt identified this job as his main trigger for his heavy alcohol use. Pt reported prior to that he drank about 3-4 days per week where he was drinking 3-4 drinks on days that he drank. Pt stated that in college (6174-7316) he was drinking only when he went to parties, which was a few times per month.     Illicit Drugs/Non-prescribed Medications: none, patient denies any use. Pt reported in 2011 after college, he started taking Lortab that was not prescribed to him. Pt reported he received Lortab from an acquaintance. Pt estimated that he took a total of 2.5 scripts, which pt estimated to be about 90 tablets. Pt estimated that this lasted over the course of a few weeks. Pt reported he felt that he was getting addicted to Lortab, so he moved to [place] for a few weeks in order to get away from that crowd. Pt reported he had never returned to use of Lortab after that.     Patient and Caregiver states clear understanding of the " "potential impact of substance use as it relates to transplant candidacy and is aware of possible random substance screening.  Substance abstinence/cessation counseling, education and resources provided and reviewed.     Arrests/DWI/Treatment/Rehab: yes.   -DUI when pt was 15. Pt reported this was expunged from his record.   - AA. Pt reported he went to several AA meetings on his own accord between 0809-8127 in order to help him stop drinking. Pt reported AA was somewhat helpful but he did not like the "Baptism aspect" of AA. Pt reported when he stopped going he continued to taper himself off alcohol.     Psychiatric History:    Mental Health: anxiety. Pt reported he has suffered from Social anxiety since childhood. Pt reported drinking helped him to socialize without anxiety. Pt reported that he is willing to seek mental health treatment in order to find healthy skills to cope with anxiety rather than drinking.    Psychiatrist/Counselor: None  Medications:  None. Pt reported he was put on different SSRI's throughout his life but pt felt that they did not work.   Suicide/Homicide Issues: Pt denied past/current HI/SI   Safety at home: Pt reported he is free from all abuse including but not limited to emotional, physical, and/or sexual abuse.     Knowledge: Patient and Caregiver states having clear understanding and realistic expectations regarding the potential risks and potential benefits of organ transplantation and organ donation, agrees to discuss with health care team members and support system members and to utilize available resources and express questions and/or concerns in order to further facilitate the pt informed decision-making.  Resources and information provided and reviewed.     Patient and Caregiver is aware of [facility]'s affiliation and/or partnership with agencies in home health care, LTAC, SNF, [facility], and other hospitals and clinics.    Understanding: Patient and Caregiver reports having a clear " understanding of the many lifetime commitments involved with being a transplant recipient, including costs, compliance, medications, lab work, procedures, appointments, concrete and financial planning, preparedness, timely and appropriate communication of concerns, abstinence (ETOH, tobacco, illicit non-prescribed drugs), adherence to all health care team recommendations, support system and caregiver involvement, appropriate and timely resource utilization and follow-through, mental health counseling as needed/recommended, and patient and caregiver responsibilities.  Social Service Handbook, resources and detailed educational information provided and reviewed.  Educational information provided.    Patient and Caregiver also reports current and expected compliance with health care regime and states having a clear understanding of the importance of compliance.      Patient and Caregiver reports a clear understanding that risks and benefits may be involved with organ transplantation and with organ donation.      Patient and Caregiver also reports clear understanding that psychosocial risk factors may affect patient, and include but are not limited to feelings of depression, generalized anxiety, anxiety regarding dependence on others, post traumatic stress disorder, feelings of guilt and other emotional and/or mental concerns, and/or exacerbation of existing mental health concerns.  Detailed resources provided and discussed.     Patient and Caregiver agrees to access appropriate resources in a timely manner as needed and/or as recommended, and to communicate concerns appropriately.  Patient and Caregiver also reports a clear understanding of treatment options available.      reviewed education, provided additional information, and answered questions.    Feelings or Concerns: Pt reported that he is hoping for a second chance at life. Pt reported that if he is able to receive a liver transplant, he hopes that  "it happens soon so that he can start the recovery process and start his new life.     Coping: Pt reported coping adequately with aid of family, friends, positive self talk, and breathing exercise. Pt agreed that he can benefit from added mental health counseling to learn more healthy coping skills.     Goals: "Go back to work and live a normal life."  Patient referred to Vocational Rehabilitation.    Interview Behavior: Patient and Caregiver presents as alert and oriented x 4, pleasant, good eye contact, well groomed, recall good, concentration/judgement good, average intelligence, calm, communicative, cooperative and asking and answering questions appropriately.          Transplant Social Work - Candidacy  Assessment/Plan:     Psychosocial Suitability: Patient presents as a fair candidate for liver transplant at this time. Based on psychosocial risk factors, patient presents as high risk, due to hx of alchol dependence with less than 3 months of sobriety. Pt has hx of anxiety which he previously coped with by drinking alcohol. Pt has a high potential for relapse which may decrease with formal addiction treatment. Pt also has a brief hx of Lortab abuse in 2011. Strengths include good social support, concrete caregiver plan with multiple committed caregivers, financial stability, no reported hx of tobacco use, and in sustained remission from drug use. Pt was able to successfully taper his drinking on his own accord and recognized that his alcohol use was problematic. Pt reported he was able to successfully remain sober for 1 month prior to being admitted to the hospital and receiving dx of ESLD. Pt verbalized his commitment to sobriety and expressed willingness to engage in formal addiction and mental health treatment.     Recommendations/Additional Comments:  - IOP Addiction treatment. Treatment prior to transplant is recommended but if pt does not get well enough to attend formal treatment prior to transplant, he " will be required to complete IOP after.   - Serial PETH tests  - Mental Health treatment for anxiety  - Local lodging  - Fundraising       Ashtyn Steward LMSW

## 2017-10-11 NOTE — PROGRESS NOTES
[facility]  Infectious Disease  Progress Note    Patient Name: Jhonny Diana  MRN: [id number]  Admission Date: 9/27/2017  Length of Stay: 14 days  Attending Physician: Armand Carrasco MD  Primary Care Provider: [facility]    Isolation Status: Contact  Assessment/Plan:      * Alcoholic hepatitis with ascites    26yo man w/a history of childhood asthma and alcohol abuse (fifth whiskey daily >10yrs, active through admission) who was admitted on 9/27/2017 as a transfer from an OSH (first admitted 9/6) with 6-8wk progressively worsening fatigue, jaundice, abdominal pain, distension, BLE edema, and 20lb weight gain due to decompensated liver failure and recently diagnosed alcoholic cirrhosis (c/b HE, EV, portal HTN, scant ascites, no SBP, HRS).      Extensive infectious work-up with no localizing source - besides stigmata of liver failure, patient with no localizing signs or symptoms, blood / urine cultures negative, ascites fluid not consistent with SBP. CT chest with groundglass opacities, sputum with ESBL, however, patient is not complaining of any pulmonary symptoms. Suspect leukocytosis in setting of alcoholic hepatitis superimposed on cirrhosis.    - Leukocytosis in setting of alcoholic hepatitis superimposed on cirrhosis - no evidence of active infection so no infectious contraindication to transplant    - Would not treat ESBL Klebsiella in sputum sample - coughing spell initiated after pulling NG tube, but resolved within a day.     - Transplant evaluation: quant gold indeterminant, T-spot sent today - if positive will need to treat with 9 months of isoniazid post-transplant    - Vaccines ordered today: HAV, HBV high dose, prevnar, influenza, Tdap  - Will need HAV in 6 months, high-dose HBV in 1, 6 months, pneumovax in 2 months              Anticipated Disposition: liver function recovery    Thank you for your consult. I will sign off. Please contact us if you have any additional  questions.    Marcela Longoria MD  Infectious Disease  [facility]    Subjective:     Principal Problem:Alcoholic hepatitis with ascites    HPI: No notes on file  Interval History:   - Patient reports discomfort due to recurrence of ascites  - Patient denied having any SOB, productive cough  - Patient reports he provided a sputum sample after NG feeding tube was pulled - afterwards he has a severe bout of productive cough and gave a sputum sample. Since then, patient denied having any additional coughs. He denied any fevers, chills, night sweats. He has difficulty with inspiration mainly from abdominal ascites build up.      Review of Systems   Constitutional: Negative for chills, diaphoresis, fever and unexpected weight change.   HENT: Negative for congestion, mouth sores, rhinorrhea, sinus pain, sore throat and trouble swallowing.    Respiratory: Negative for cough, shortness of breath and stridor.    Cardiovascular: Negative for chest pain and leg swelling.   Gastrointestinal: Positive for abdominal distention and abdominal pain. Negative for blood in stool, diarrhea, nausea and vomiting.   Genitourinary: Negative for dysuria, flank pain and genital sores.   Musculoskeletal: Negative for arthralgias, back pain, joint swelling, myalgias and neck stiffness.   Skin: Negative for rash and wound.   Neurological: Negative for light-headedness and headaches.   Hematological: Negative for adenopathy.     Objective:     Vital Signs (Most Recent):  Temp: 98.3 °F (36.8 °C) (10/11/17 0840)  Pulse: (!) 111 (10/11/17 0840)  Resp: 18 (10/11/17 0840)  BP: 134/67 (10/11/17 0840)  SpO2: 99 % (10/11/17 0840) Vital Signs (24h Range):  Temp:  [98.3 °F (36.8 °C)-99.8 °F (37.7 °C)] 98.3 °F (36.8 °C)  Pulse:  [102-116] 111  Resp:  [18-19] 18  SpO2:  [96 %-99 %] 99 %  BP: (120-134)/(59-67) 134/67     Weight: 82.3 kg (181 lb 7 oz)  Body mass index is 28.42 kg/m².    Estimated Creatinine Clearance: 75.3 mL/min (based on  SCr of 1.5 mg/dL (H)).    Physical Exam   Constitutional: He is oriented to person, place, and time. No distress.   HENT:   Mouth/Throat: Oropharynx is clear and moist. No oropharyngeal exudate.   Eyes: Conjunctivae are normal. Scleral icterus is present.   Neck: Normal range of motion. Neck supple.   Cardiovascular: Normal rate and regular rhythm.  Exam reveals no friction rub.    No murmur heard.  Pulmonary/Chest: Effort normal and breath sounds normal. No respiratory distress. He has no wheezes. He has no rales.   Abdominal: Soft. Bowel sounds are normal. He exhibits distension. There is no tenderness. There is no rebound and no guarding.   +ascites   Musculoskeletal: Normal range of motion. He exhibits edema.   Lymphadenopathy:     He has no cervical adenopathy.   Neurological: He is alert and oriented to person, place, and time.   Skin: Skin is warm and dry. No rash noted. He is not diaphoretic. No erythema.   jaundice   Psychiatric: He has a normal mood and affect.   Vitals reviewed.      Significant Labs: All pertinent labs within the past 24 hours have been reviewed.  MELD 29    Significant Imaging: I have reviewed all pertinent imaging results/findings within the past 24 hours.

## 2017-10-11 NOTE — SUBJECTIVE & OBJECTIVE
Interval History:   - Patient reports discomfort due to recurrence of ascites  - Patient denied having any SOB, productive cough  - Patient reports he provided a sputum sample after NG feeding tube was pulled - afterwards he has a severe bout of productive cough and gave a sputum sample. Since then, patient denied having any additional coughs. He denied any fevers, chills, night sweats. He has difficulty with inspiration mainly from abdominal ascites build up.      Review of Systems   Constitutional: Negative for chills, diaphoresis, fever and unexpected weight change.   HENT: Negative for congestion, mouth sores, rhinorrhea, sinus pain, sore throat and trouble swallowing.    Respiratory: Negative for cough, shortness of breath and stridor.    Cardiovascular: Negative for chest pain and leg swelling.   Gastrointestinal: Positive for abdominal distention and abdominal pain. Negative for blood in stool, diarrhea, nausea and vomiting.   Genitourinary: Negative for dysuria, flank pain and genital sores.   Musculoskeletal: Negative for arthralgias, back pain, joint swelling, myalgias and neck stiffness.   Skin: Negative for rash and wound.   Neurological: Negative for light-headedness and headaches.   Hematological: Negative for adenopathy.     Objective:     Vital Signs (Most Recent):  Temp: 98.3 °F (36.8 °C) (10/11/17 0840)  Pulse: (!) 111 (10/11/17 0840)  Resp: 18 (10/11/17 0840)  BP: 134/67 (10/11/17 0840)  SpO2: 99 % (10/11/17 0840) Vital Signs (24h Range):  Temp:  [98.3 °F (36.8 °C)-99.8 °F (37.7 °C)] 98.3 °F (36.8 °C)  Pulse:  [102-116] 111  Resp:  [18-19] 18  SpO2:  [96 %-99 %] 99 %  BP: (120-134)/(59-67) 134/67     Weight: 82.3 kg (181 lb 7 oz)  Body mass index is 28.42 kg/m².    Estimated Creatinine Clearance: 75.3 mL/min (based on SCr of 1.5 mg/dL (H)).    Physical Exam   Constitutional: He is oriented to person, place, and time. No distress.   HENT:   Mouth/Throat: Oropharynx is clear and moist. No  oropharyngeal exudate.   Eyes: Conjunctivae are normal. Scleral icterus is present.   Neck: Normal range of motion. Neck supple.   Cardiovascular: Normal rate and regular rhythm.  Exam reveals no friction rub.    No murmur heard.  Pulmonary/Chest: Effort normal and breath sounds normal. No respiratory distress. He has no wheezes. He has no rales.   Abdominal: Soft. Bowel sounds are normal. He exhibits distension. There is no tenderness. There is no rebound and no guarding.   +ascites   Musculoskeletal: Normal range of motion. He exhibits edema.   Lymphadenopathy:     He has no cervical adenopathy.   Neurological: He is alert and oriented to person, place, and time.   Skin: Skin is warm and dry. No rash noted. He is not diaphoretic. No erythema.   jaundice   Psychiatric: He has a normal mood and affect.   Vitals reviewed.      Significant Labs: All pertinent labs within the past 24 hours have been reviewed.  MELD 29    Significant Imaging: I have reviewed all pertinent imaging results/findings within the past 24 hours.

## 2017-10-11 NOTE — PLAN OF CARE
Problem: Occupational Therapy Goal  Goal: Occupational Therapy Goal  Goals to be met by: 10/17/2017    Patient will increase functional independence with ADLs by performing:    UE Dressing with Supervision.  LE Dressing with Supervision.  Grooming while seated with Supervision.  Toileting from bedside commode with Supervision for hygiene and clothing management.  MET 10/11 with satisfactory achievement at toilet  Sitting at edge of bed or bedside chair  x30 minutes with Modified [place].  Rolling to Bilateral with Supervision.   Supine to sit with Supervision.  Stand pivot transfers with Supervision.  Toilet transfer to bedside commode with Supervision. DC 10/11  Updated: Toilet t/f with Supervision.         Pt progressing meeting toileting care and t/f goals. Other goals remain appropriate.  JULIETTE Cooper  10/11/2017

## 2017-10-11 NOTE — ASSESSMENT & PLAN NOTE
· Appreciate Hepatology assistance  · Continue Lactulose and Rifaximin for HE  · Currently not transplant candidate per Psych and Hep.  Addiction Psych consulted  · IR para 10/9- 7.8 L fluid removed negative for SBP  · Stressed the importance of high calorie diet to prevent NGT insertion again - Boost TID  · Patient agreed to due alcohol rehab if patient was to get a liver transplant; we have initiated inpatient liver transplant evaluation; possible presentation on friday    MELD-Na score: 31 at 10/10/2017  4:50 AM  MELD score: 27 at 10/10/2017  4:50 AM  Calculated from:  Serum Creatinine: 1.4 mg/dL at 10/10/2017  4:50 AM  Serum Sodium: 127 mmol/L at 10/10/2017  4:50 AM  Total Bilirubin: 29.8 mg/dL at 10/10/2017  4:50 AM  INR(ratio): 1.5 at 10/10/2017  4:50 AM  Age: 27 years

## 2017-10-11 NOTE — PLAN OF CARE
Problem: Patient Care Overview  Goal: Plan of Care Review  Outcome: Ongoing (interventions implemented as appropriate)  Pt free from falls. Pt wears non slip socks when ambulating. Pt bed low and locked position. Pt afbrile. Pt IV site without redness or edema.  Pt has denied any pain or discomfort this shift.

## 2017-10-11 NOTE — SUBJECTIVE & OBJECTIVE
Interval History: No acute events overnight. This morning patient is up to chair and speaking to the .    Current Facility-Administered Medications   Medication    albumin human 25% bottle 25 g    Amino acid 4.25% - dextrose 5% (CLINIMIX-E) solution with additives (1L provides 42.5 gm AA, 50 gm CHO (170 kcal/L dextrose), Na 35, K 30, Mg 5, Ca 4.5, Acetate 70, Cl 39, Phos 15)    heparin (porcine) injection 5,000 Units    hepatitis A virus vaccine injection 1,440 Units    hepatitis B virus vacc.rec(PF) injection 40 mcg    influenza (FLUZONE HIGH-DOSE) vaccine 0.5 mL    lactulose 20 gram/30 mL solution Soln 30 g    ondansetron injection 4 mg    pantoprazole EC tablet 40 mg    pneumoc 13-arianne conj-dip cr(PF) 0.5 mL    rifAXIMin tablet 550 mg    sodium bicarbonate tablet 1,300 mg    tramadol tablet 50 mg       Objective:     Vital Signs (Most Recent):  Temp: 99.3 °F (37.4 °C) (10/11/17 1144)  Pulse: 109 (10/11/17 1144)  Resp: 18 (10/11/17 1144)  BP: (!) 115/57 (10/11/17 1144)  SpO2: 96 % (10/11/17 1144) Vital Signs (24h Range):  Temp:  [98.3 °F (36.8 °C)-99.8 °F (37.7 °C)] 99.3 °F (37.4 °C)  Pulse:  [102-116] 109  Resp:  [18-19] 18  SpO2:  [96 %-99 %] 96 %  BP: (115-134)/(57-67) 115/57     Weight: 82.3 kg (181 lb 7 oz) (10/11/17 0500)  Body mass index is 28.42 kg/m².    Physical Exam   Constitutional: He is oriented to person, place, and time. No distress.   HENT:   Head: Normocephalic.   Eyes: Pupils are equal, round, and reactive to light. Scleral icterus is present.   Neck: Normal range of motion.   Cardiovascular:   Sinus tachycardia     Pulmonary/Chest: Effort normal and breath sounds normal. No respiratory distress. He has no wheezes. He has no rales. He exhibits no tenderness.   Abdominal: Bowel sounds are normal. He exhibits distension. He exhibits no mass. There is tenderness. There is no rebound and no guarding. No hernia.   Musculoskeletal: Normal range of motion. He exhibits edema.    Neurological: He is alert and oriented to person, place, and time.   Skin: He is not diaphoretic.       MELD-Na score: 30 at 10/11/2017  4:40 AM  MELD score: 27 at 10/11/2017  4:40 AM  Calculated from:  Serum Creatinine: 1.5 mg/dL at 10/11/2017  4:40 AM  Serum Sodium: 129 mmol/L at 10/11/2017  4:40 AM  Total Bilirubin: 29.4 mg/dL at 10/11/2017  4:40 AM  INR(ratio): 1.4 at 10/11/2017  4:40 AM  Age: 27 years    Significant Labs:  CBC:   Recent Labs  Lab 10/11/17  0440   WBC 20.79*   RBC 2.17*   HGB 8.3*   HCT 23.1*        CMP:   Recent Labs  Lab 10/11/17  0440      CALCIUM 8.4*   ALBUMIN 2.5*   PROT 5.6*   *   K 4.1   CO2 14*      BUN 46*   CREATININE 1.5*   ALKPHOS 143*   ALT 61*   *   BILITOT 29.4*     Coagulation:   Recent Labs  Lab 10/11/17  0440   INR 1.4*       Significant Imaging:  No recent imaging studies to review

## 2017-10-12 ENCOUNTER — DOCUMENTATION ONLY (OUTPATIENT)
Dept: TRANSPLANT | Facility: CLINIC | Age: 27
End: 2017-10-12

## 2017-10-12 LAB
ALBUMIN SERPL BCP-MCNC: 2.7 G/DL
ALP SERPL-CCNC: 125 U/L
ALT SERPL W/O P-5'-P-CCNC: 63 U/L
ANION GAP SERPL CALC-SCNC: 11 MMOL/L
APPEARANCE FLD: CLEAR
AST SERPL-CCNC: 119 U/L
BASOPHILS # BLD AUTO: 0.04 K/UL
BASOPHILS NFR BLD: 0.2 %
BILIRUB SERPL-MCNC: 31 MG/DL
BODY FLD TYPE: NORMAL
BUN SERPL-MCNC: 51 MG/DL
CALCIUM SERPL-MCNC: 8.6 MG/DL
CHLORIDE SERPL-SCNC: 100 MMOL/L
CO2 SERPL-SCNC: 15 MMOL/L
COLOR FLD: YELLOW
CREAT SERPL-MCNC: 1.8 MG/DL
DIFFERENTIAL METHOD: ABNORMAL
EOSINOPHIL # BLD AUTO: 0.2 K/UL
EOSINOPHIL NFR BLD: 0.8 %
ERYTHROCYTE [DISTWIDTH] IN BLOOD BY AUTOMATED COUNT: 14.9 %
EST. GFR  (AFRICAN AMERICAN): 57.9 ML/MIN/1.73 M^2
EST. GFR  (NON AFRICAN AMERICAN): 50.1 ML/MIN/1.73 M^2
GLUCOSE SERPL-MCNC: 111 MG/DL
HCT VFR BLD AUTO: 21.5 %
HGB BLD-MCNC: 7.6 G/DL
HIV 1+2 AB+HIV1 P24 AG SERPL QL IA: NEGATIVE
INR PPP: 1.5
LYMPHOCYTES # BLD AUTO: 1.4 K/UL
LYMPHOCYTES NFR BLD: 7.3 %
LYMPHOCYTES NFR FLD MANUAL: 52 %
MAGNESIUM SERPL-MCNC: 2.1 MG/DL
MCH RBC QN AUTO: 37.6 PG
MCHC RBC AUTO-ENTMCNC: 35.3 G/DL
MCV RBC AUTO: 106 FL
MESOTHL CELL NFR FLD MANUAL: 8 %
MONOCYTES # BLD AUTO: 1.7 K/UL
MONOCYTES NFR BLD: 9 %
MONOS+MACROS NFR FLD MANUAL: 13 %
NEUTROPHILS # BLD AUTO: 16 K/UL
NEUTROPHILS NFR BLD: 82.7 %
NEUTROPHILS NFR FLD MANUAL: 27 %
PHOSPHATE SERPL-MCNC: 4.1 MG/DL
PLATELET # BLD AUTO: 158 K/UL
PMV BLD AUTO: 10.5 FL
POTASSIUM SERPL-SCNC: 3.6 MMOL/L
PROT SERPL-MCNC: 5.6 G/DL
PROTHROMBIN TIME: 15.5 SEC
RBC # BLD AUTO: 2.02 M/UL
SODIUM SERPL-SCNC: 126 MMOL/L
WBC # BLD AUTO: 19.42 K/UL
WBC # FLD: 66 /CU MM

## 2017-10-12 PROCEDURE — 63600175 PHARM REV CODE 636 W HCPCS: Performed by: NURSE PRACTITIONER

## 2017-10-12 PROCEDURE — 90662 IIV NO PRSV INCREASED AG IM: CPT | Mod: NTX | Performed by: INTERNAL MEDICINE

## 2017-10-12 PROCEDURE — 90746 HEPB VACCINE 3 DOSE ADULT IM: CPT | Mod: NTX | Performed by: INTERNAL MEDICINE

## 2017-10-12 PROCEDURE — 90472 IMMUNIZATION ADMIN EACH ADD: CPT | Mod: NTX | Performed by: INTERNAL MEDICINE

## 2017-10-12 PROCEDURE — 85025 COMPLETE CBC W/AUTO DIFF WBC: CPT | Mod: NTX

## 2017-10-12 PROCEDURE — 90670 PCV13 VACCINE IM: CPT | Mod: NTX | Performed by: INTERNAL MEDICINE

## 2017-10-12 PROCEDURE — 25000003 PHARM REV CODE 250: Mod: NTX | Performed by: HOSPITALIST

## 2017-10-12 PROCEDURE — 97535 SELF CARE MNGMENT TRAINING: CPT

## 2017-10-12 PROCEDURE — 85610 PROTHROMBIN TIME: CPT | Mod: NTX

## 2017-10-12 PROCEDURE — 89051 BODY FLUID CELL COUNT: CPT | Mod: NTX

## 2017-10-12 PROCEDURE — 90471 IMMUNIZATION ADMIN: CPT | Mod: NTX | Performed by: INTERNAL MEDICINE

## 2017-10-12 PROCEDURE — 25000003 PHARM REV CODE 250: Mod: NTX | Performed by: INTERNAL MEDICINE

## 2017-10-12 PROCEDURE — 84100 ASSAY OF PHOSPHORUS: CPT | Mod: NTX

## 2017-10-12 PROCEDURE — 63600175 PHARM REV CODE 636 W HCPCS

## 2017-10-12 PROCEDURE — P9047 ALBUMIN (HUMAN), 25%, 50ML: HCPCS | Performed by: HOSPITALIST

## 2017-10-12 PROCEDURE — 63600175 PHARM REV CODE 636 W HCPCS: Mod: NTX | Performed by: INTERNAL MEDICINE

## 2017-10-12 PROCEDURE — 63600175 PHARM REV CODE 636 W HCPCS: Mod: NTX | Performed by: HOSPITALIST

## 2017-10-12 PROCEDURE — 0W9G3ZZ DRAINAGE OF PERITONEAL CAVITY, PERCUTANEOUS APPROACH: ICD-10-PCS | Performed by: RADIOLOGY

## 2017-10-12 PROCEDURE — 99233 SBSQ HOSP IP/OBS HIGH 50: CPT | Mod: NTX,,, | Performed by: HOSPITALIST

## 2017-10-12 PROCEDURE — 36415 COLL VENOUS BLD VENIPUNCTURE: CPT | Mod: NTX

## 2017-10-12 PROCEDURE — 83735 ASSAY OF MAGNESIUM: CPT | Mod: NTX

## 2017-10-12 PROCEDURE — 80053 COMPREHEN METABOLIC PANEL: CPT | Mod: NTX

## 2017-10-12 PROCEDURE — 20600001 HC STEP DOWN PRIVATE ROOM: Mod: NTX

## 2017-10-12 PROCEDURE — 97530 THERAPEUTIC ACTIVITIES: CPT

## 2017-10-12 RX ORDER — ONDANSETRON 2 MG/ML
4 INJECTION INTRAMUSCULAR; INTRAVENOUS EVERY 4 HOURS PRN
Status: DISCONTINUED | OUTPATIENT
Start: 2017-10-12 | End: 2017-10-20

## 2017-10-12 RX ORDER — ONDANSETRON 2 MG/ML
4 INJECTION INTRAMUSCULAR; INTRAVENOUS EVERY 4 HOURS
Status: DISCONTINUED | OUTPATIENT
Start: 2017-10-12 | End: 2017-10-12

## 2017-10-12 RX ORDER — ALBUMIN HUMAN 250 G/1000ML
25 SOLUTION INTRAVENOUS 3 TIMES DAILY
Status: DISCONTINUED | OUTPATIENT
Start: 2017-10-12 | End: 2017-10-12

## 2017-10-12 RX ORDER — ALBUMIN HUMAN 250 G/1000ML
25 SOLUTION INTRAVENOUS EVERY 6 HOURS
Status: DISCONTINUED | OUTPATIENT
Start: 2017-10-12 | End: 2017-10-12

## 2017-10-12 RX ORDER — MIDODRINE HYDROCHLORIDE 2.5 MG/1
2.5 TABLET ORAL 3 TIMES DAILY
Status: DISCONTINUED | OUTPATIENT
Start: 2017-10-12 | End: 2017-10-12

## 2017-10-12 RX ORDER — OCTREOTIDE ACETATE 100 UG/ML
100 INJECTION, SOLUTION INTRAVENOUS; SUBCUTANEOUS EVERY 8 HOURS
Status: DISCONTINUED | OUTPATIENT
Start: 2017-10-12 | End: 2017-10-13

## 2017-10-12 RX ADMIN — ONDANSETRON 4 MG: 2 INJECTION INTRAMUSCULAR; INTRAVENOUS at 08:10

## 2017-10-12 RX ADMIN — PNEUMOCOCCAL 13-VALENT CONJUGATE VACCINE 0.5 ML: 2.2; 2.2; 2.2; 2.2; 2.2; 4.4; 2.2; 2.2; 2.2; 2.2; 2.2; 2.2; 2.2 INJECTION, SUSPENSION INTRAMUSCULAR at 03:10

## 2017-10-12 RX ADMIN — ALBUMIN (HUMAN) 25 G: 12.5 SOLUTION INTRAVENOUS at 01:10

## 2017-10-12 RX ADMIN — SODIUM BICARBONATE 650 MG TABLET 1300 MG: at 08:10

## 2017-10-12 RX ADMIN — OCTREOTIDE ACETATE 100 MCG: 100 INJECTION, SOLUTION INTRAVENOUS; SUBCUTANEOUS at 08:10

## 2017-10-12 RX ADMIN — PANTOPRAZOLE SODIUM 40 MG: 40 TABLET, DELAYED RELEASE ORAL at 03:10

## 2017-10-12 RX ADMIN — HEPATITIS B VACCINE (RECOMBINANT) 40 MCG: 20 INJECTION, SUSPENSION INTRAMUSCULAR at 08:10

## 2017-10-12 RX ADMIN — INFLUENZA A VIRUSA/MICHIGAN/45/2015 X-275 (H1N1) ANTIGEN (FORMALDEHYDE INACTIVATED), INFLUENZA A VIRUS A/HONG KONG/4801/2014 X-263B (H3N2) ANTIGEN (FORMALDEHYDE INACTIVATED), AND INFLUENZA B VIRUS B/BRISBANE/60/2008 ANTIGEN (FORMALDEHYDE INACTIVATED) 0.5 ML: 60; 60; 60 INJECTION, SUSPENSION INTRAMUSCULAR at 03:10

## 2017-10-12 RX ADMIN — SODIUM BICARBONATE 650 MG TABLET 1300 MG: at 06:10

## 2017-10-12 RX ADMIN — HEPARIN SODIUM 5000 UNITS: 5000 INJECTION, SOLUTION INTRAVENOUS; SUBCUTANEOUS at 08:10

## 2017-10-12 RX ADMIN — LACTULOSE 30 G: 20 SOLUTION ORAL at 05:10

## 2017-10-12 RX ADMIN — HEPARIN SODIUM 5000 UNITS: 5000 INJECTION, SOLUTION INTRAVENOUS; SUBCUTANEOUS at 06:10

## 2017-10-12 RX ADMIN — LACTULOSE 30 G: 20 SOLUTION ORAL at 06:10

## 2017-10-12 RX ADMIN — RIFAXIMIN 550 MG: 550 TABLET ORAL at 08:10

## 2017-10-12 RX ADMIN — ONDANSETRON 4 MG: 2 INJECTION INTRAMUSCULAR; INTRAVENOUS at 01:10

## 2017-10-12 RX ADMIN — RIFAXIMIN 550 MG: 550 TABLET ORAL at 10:10

## 2017-10-12 RX ADMIN — ASCORBIC ACID, VITAMIN A PALMITATE, CHOLECALCIFEROL, THIAMINE HYDROCHLORIDE, RIBOFLAVIN-5 PHOSPHATE SODIUM, PYRIDOXINE HYDROCHLORIDE, NIACINAMIDE, DEXPANTHENOL, ALPHA-TOCOPHEROL ACETATE, VITAMIN K1, FOLIC ACID, BIOTIN, CYANOCOBALAMIN: 200; 3300; 200; 6; 3.6; 6; 40; 15; 10; 150; 600; 60; 5 INJECTION, SOLUTION INTRAVENOUS at 05:10

## 2017-10-12 RX ADMIN — OCTREOTIDE ACETATE 100 MCG: 100 INJECTION, SOLUTION INTRAVENOUS; SUBCUTANEOUS at 11:10

## 2017-10-12 RX ADMIN — PANTOPRAZOLE SODIUM 40 MG: 40 TABLET, DELAYED RELEASE ORAL at 06:10

## 2017-10-12 RX ADMIN — LACTULOSE 30 G: 20 SOLUTION ORAL at 11:10

## 2017-10-12 RX ADMIN — SODIUM BICARBONATE 650 MG TABLET 1300 MG: at 02:10

## 2017-10-12 NOTE — PROGRESS NOTES
Pt c/o nausea.  Zofran given around 0830 this am.  Next dose not able to be given until 1430.  Orders from Dr. Danilo ordoñez for pt to receive zofran early, now.      Will carry out orders and give zofran.  Will continue to monitor.

## 2017-10-12 NOTE — PROGRESS NOTES
Pt back from IR.  Paracentesis done w/ 1.8L removed.  No issues noted. Site w/ gauze dressing CDI.  Zofran given for nausea while in IR.  Report received from SISSY Parson.

## 2017-10-12 NOTE — ASSESSMENT & PLAN NOTE
· SLP consulted and ok for dental soft diet  · Dietary consulted  · Stressed the importance of eating high protein high calorie to prevent NGT re-insertion with TF  · Calorie counts and Boost TID  · Started patient on PPN on 10/11

## 2017-10-12 NOTE — PROGRESS NOTES
"[facility] Hospital Medicine  Progress Note    Patient Name: Jhonny Diana  MRN: [id number]  Patient Class: IP- Inpatient   Admission Date: 9/27/2017  Length of Stay: 14 days  Attending Physician: Armand Carrasco MD  Primary Care Provider: Provider [facility] Medicine Team: [facility] HOSP MED A Armand Carrasco MD    Subjective:     Principal Problem:Alcoholic hepatitis with ascites    HPI:  26 yo male with hx of childhood asthma and heavy alcohol abuse (fifth of liquor daily since teenager, last drink 2.5 wks ago per mother) who was transferred to [facility] for higher level of care/liver transplant evaluation. Patient initially presented to OSH for a 2 mo hx of progressively worsening abdominal pain with associated fatigue, jaundice, and abdominal distention with ~20# weight gain. HPI given by patient's mother and OSH records as patient's participation in interview limited by previous administration of pain medication at OSH.      Per mother, patient did not have a fever, chills, dark stools, known sick contacts, nausea, vomiting, diarrhea, confusion, or HA. Reports family hx of alcoholic cirrhosis in father. Pt was recently diagnosed with alcoholic hepatitis during his admission at OSH on 9/6-9/15. During that hospitalization, patient was given ceftriaxone for SBP ppx, prednisone for alcoholic hepatitis, and diuresed. Pt was discharged and instructed to follow up with PCP and AA within 1 wk. After his discharge, patient returned to ED 4 days after for increased weight gain and worsening abdominal pain/jaundice. At OSH, patient was given fluids (for suspected pancreatitis), diuresed and given IV albumin. Two days prior to transfer to [facility], patient had an episode of hematemesis and underwent an EGD on 9/25 that revealed "1 sm varix in distal esophagus, no stigmata of bleeding, gastropathy of portal hypertension, and reflux esophagitis".  No further episodes of hematemesis noted since initial episode. "      Recent labs at OSH: Na 134, CO2 18, BUN 36, Cr 2, Tprot 5.9, albumin 1.9, Tbili 24, alk phos 83, , ALT51. Patient also had lipase 532 and ammonia 118 during his previous hospitalization at OSH. Labwork also notable for negative mitochondrial ab, sm muscle ab, and ceruloplasmin 22. Imaging at OSH revealing abdo U/S (9/20): hepatosplenomegaly, CT abdo w contrast: hepatomegaly with marked fatty infiltration of the liver and mild ascites (9/6). No CDs available for review.    Hospital Course:  Mr. Diana was admitted to the CMICU on 9/27 for decompensated alcoholic hepatitis with high MELD score, severe HE, and LAURA. Hepatology consulted and following to evaluate liver transplant candidacy. Patient not currently a transplant candidate due to recent alcohol use; recommending re-evaluation by addiction psych and social work when more medically stable and able to participate. Pt started on albumin and lasix for LAURA and possibility for HRS. However nephrology felt that LAURA is likely multifactorial: ATN given hypotensive episodes at OSH and toxic given severe hyperbilirubinemia; not truly HRS. Lactulose/rifaximin started for HE and mental status improving slowly. Mr. Diana has had significant ABD pain since admission with ABD distension. CT abdomen was significant for pericolonic fat stranding of right colon; no obstruction. Also with diffuse mesenteric edema. No significant ascites noted, no cholecystitis/cholelithiasis/ductal dilation. US liver with doppler showed appropriate vasculature.  He was stepped down to Hospital Medicine on 10/5.  Paracentesis was done on 10/9 with 3.3L removed.  His diet was advanced and Dietary was consulted to help get adequate caloric intake.    Interval History: patient is feeling discomfort today with his abdomen; planning for IR paracentesis; dietary consulted and PPN started to aide in nutrition; will give one dose of lasix IV to see if that assists in patient's abdomoninal  distention;   - has had 2 BMs already today    Review of Systems   Constitutional: Negative for chills, fatigue and fever.   HENT: Negative for sore throat and trouble swallowing.    Eyes: Negative for photophobia and visual disturbance.   Respiratory: Negative for cough and shortness of breath.    Cardiovascular: Negative for chest pain, palpitations and leg swelling.   Gastrointestinal: Positive for abdominal distention. Negative for abdominal pain, constipation, diarrhea, nausea and vomiting.   Endocrine: Negative for cold intolerance and heat intolerance.   Genitourinary: Negative for dysuria and frequency.   Musculoskeletal: Negative for arthralgias and myalgias.   Skin: Positive for color change. Negative for rash and wound.   Neurological: Negative for dizziness, syncope, weakness and light-headedness.   Psychiatric/Behavioral: Negative for confusion and hallucinations.   All other systems reviewed and are negative.    Objective:     Vital Signs (Most Recent):  Temp: 98.2 °F (36.8 °C) (10/11/17 1932)  Pulse: (!) 120 (10/11/17 1945)  Resp: 18 (10/11/17 1932)  BP: 131/60 (10/11/17 1932)  SpO2: 96 % (10/11/17 1932) Vital Signs (24h Range):  Temp:  [98.2 °F (36.8 °C)-99.3 °F (37.4 °C)] 98.2 °F (36.8 °C)  Pulse:  [107-120] 120  Resp:  [16-18] 18  SpO2:  [95 %-99 %] 96 %  BP: (115-134)/(57-67) 131/60     Weight: 82.3 kg (181 lb 7 oz)  Body mass index is 28.42 kg/m².    Intake/Output Summary (Last 24 hours) at 10/11/17 2025  Last data filed at 10/11/17 1700   Gross per 24 hour   Intake              620 ml   Output                0 ml   Net              620 ml      Physical Exam   Constitutional: He appears well-developed and well-nourished. He is cooperative.  Non-toxic appearance. No distress.   HENT:   Head: Normocephalic and atraumatic.   Mouth/Throat: Oropharynx is clear and moist.   Eyes: EOM are normal. Pupils are equal, round, and reactive to light. Scleral icterus is present.   Neck: Normal range of motion.  Neck supple.   Cardiovascular: Regular rhythm, normal heart sounds and normal pulses.  Tachycardia present.    No murmur heard.  Sinus tachycardia   Pulmonary/Chest: Effort normal. No respiratory distress. He has decreased breath sounds in the right lower field and the left lower field. He has no wheezes. He has no rales.   Abdominal: Soft. Normal appearance. He exhibits distension. He exhibits no mass. Bowel sounds are decreased. There is no tenderness. There is no rebound and no guarding. No hernia.   Musculoskeletal: Normal range of motion. He exhibits no edema.   Lymphadenopathy:     He has no cervical adenopathy.   Neurological: He is alert. He is not disoriented.   Oriented to person and place today   Skin: Skin is warm and dry. He is not diaphoretic.   Old blister/ wart on right plantar surface of the foot near the big toe   Psychiatric: He has a normal mood and affect. His behavior is normal. His affect is not labile. His speech is delayed. Cognition and memory are not impaired.   Nursing note and vitals reviewed.      Significant Labs:   CBC:     Recent Labs  Lab 10/10/17  0450 10/11/17  0440   WBC 24.13* 20.79*   HGB 9.0* 8.3*   HCT 25.7* 23.1*    161     CMP:     Recent Labs  Lab 10/10/17  0450 10/11/17  0440   * 129*   K 4.4 4.1    103   CO2 12* 14*    108   BUN 48* 46*   CREATININE 1.4 1.5*   CALCIUM 8.4* 8.4*   PROT 5.5* 5.6*   ALBUMIN 2.2* 2.5*   BILITOT 29.8* 29.4*   ALKPHOS 147* 143*   * 109*   ALT 62* 61*   ANIONGAP 12 12   EGFRNONAA >60.0 >60.0     Coagulation:     Recent Labs  Lab 10/11/17  0440   INR 1.4*     All pertinent labs within the past 24 hours have been reviewed.    Significant Imaging: CXR: I have reviewed all pertinent results/findings within the past 24 hours and my personal findings are:  NG in appropriate position    Assessment/Plan:      * Alcoholic hepatitis with ascites    · Appreciate Hepatology assistance  · Continue Lactulose and Rifaximin for  HE  · Currently not transplant candidate per Psych and Hep.  Addiction Psych consulted  · IR para 10/9- 7.8 L fluid removed negative for SBP  · Stressed the importance of high calorie diet to prevent NGT insertion again - Boost TID  · Patient agreed to due alcohol rehab if patient was to get a liver transplant; we have initiated inpatient liver transplant evaluation; possible presentation on friday    MELD-Na score: 30 at 10/11/2017  4:40 AM  MELD score: 27 at 10/11/2017  4:40 AM  Calculated from:  Serum Creatinine: 1.5 mg/dL at 10/11/2017  4:40 AM  Serum Sodium: 129 mmol/L at 10/11/2017  4:40 AM  Total Bilirubin: 29.4 mg/dL at 10/11/2017  4:40 AM  INR(ratio): 1.4 at 10/11/2017  4:40 AM  Age: 27 years            Ascites due to alcoholic cirrhosis    As above          Severe malnutrition    · SLP consulted and ok for dental soft diet  · Dietary consulted  · Stressed the importance of eating high protein high calorie to prevent NGT re-insertion with TF  · Calorie counts and Boost TID  · Started patient on PPN on 10/11        Coagulopathy    · 2/2 liver disease  · No bleeding; Monitor          Leukocytosis    · WBC remains elevated - stable at 26  · Remains afebrile  · Repeat blood cultures NGTD  · UA negative  · Resp cx ESBL Klebsiella.  ID following.  Will discuss antibiotics        Metabolic acidosis with normal anion gap and bicarbonate losses    · 2/2 diarrhea given lactulose.  Currently on 10g daily with improving BMs  · Still acidotic, will monitor  · Sodium Bicarb TID          Decompensated hepatic cirrhosis with ascites and HE    See alcoholic hepatitis with ascites          Anasarca    · 2/2 liver disease and hypoalbuminemia          Hyponatremia    · Sodium 128 today, will fluid restrict to 1L and give albumin          History of hematemesis    · Pt had an episode of hematemesis at OSH and EGD that revealed 1 small varix, reflux esophagitis, and gastropathy of portal hypertension  · No evidence of bleeding  currently and relatively stable hemgolobin  · Continue Pantoprazole BID        Acute renal failure with tubular necrosis    · Appreciate Nephro's help  · Suspect LAURA multifactorial: ischemic ATN given hypotension and injury due to hyperbilirubinemia. Felt less likely to be HRS however have continued albumin with lasix  · Overall, LAURA improving and making adequate urine.  Not measuring amounts as he pulls off condom catheter and is in diapers - but mother reports frequent wet diapers          Hepatic encephalopathy    · Mentation improving  · Continue Lactulose and Rifaxmin    10/10 - mentation AAO times 4; only one BM today; will schedule lactulose today          Alcoholism /alcohol abuse    · Reported about a fifth of whiskey daily since teenager  · Appreciate Psych and Hep assistance; currently high risk for liver transplant.  Addiction Psych reconsulted, state prognosis is gaurded however would need ABU if transplant is need urgently which patient has agreed to do  · PETH 174 on admit, 9/27          VTE Risk Mitigation         Ordered     heparin (porcine) injection 5,000 Units  Every 8 hours     Route:  Subcutaneous        10/03/17 1740     Place sequential compression device  Until discontinued      09/28/17 0238     Medium Risk of VTE  Once      09/27/17 1427              Armand Carrasco MD  Department of Hospital Medicine   [facility]

## 2017-10-12 NOTE — PLAN OF CARE
Problem: Patient Care Overview  Goal: Plan of Care Review  Outcome: Ongoing (interventions implemented as appropriate)  Pt AAOx4 today.  VSS - BPs 130s/60s, O2 sats >96% on RA, Tmax 99 F.  TELE monitoring in progress, ST w/ -110s.    Liver workup/evaluation in progress.  Plan for pt to be presented to board tomorrow in order to be liver listed.  Vaccinations ordered and completed today (Hep A, influenza, pneumococcal, Tdap).  Hep B still needs to be given.  Paracentesis done this am w/ much improvement in abdominal distention and discomfort.  1.8L off.  Labs sent.  Site CDI.  Leaking noted to previous para site.  Clean dressing placed multiple times throughout the day.    AM labs monitored.  Hyponatremia worse today Na+ 126.  1L FR in place.  Free water restricted.  , ALT 63.  SCr 1.8.    High protein/calorie diet ordered.  Boost on board.  Pt eating small bites of meals today w/ boost. PO intake encouraged. PPN continued @ 50 cc/hr.    No skin breakdown.  Adhesive tape minimized.  Jaundice noted.  Sclera yellowed.  Blister on lip w/ intermittent bleeding throughout the day.  2 BMs w/ 2 unmeasured urine so far today.  Lactulose given as scheduled three times a day.  PT/OT on board.  Activity encouraged throughout the day.  Walker at bedside.  Pt seen walking the hallway this afternoon.  Fall precautions in place.  Mother at bedside.  Bed alarm on at night.  Non-skid socks on feet.  Call bell in reach at all times.  Contact isolation maintained for ESBL in the sputum.  Hand washing encouraged.  No acute events/falls/injuries. See flowsheet for further assessment findings. Will continue to monitor.

## 2017-10-12 NOTE — PLAN OF CARE
Problem: Occupational Therapy Goal  Goal: Occupational Therapy Goal  Goals to be met by: 10/17/2017    Patient will increase functional independence with ADLs by performing:    UE Dressing with Supervision.  LE Dressing with Supervision. MET 10/12  Grooming while seated with Supervision. DC 10/12  Updated: Grooming while standing sink side with Mod (I).  Toileting from bedside commode with Supervision for hygiene and clothing management.  MET 10/11 with satisfactory achievement at toilet  Sitting at edge of bed or bedside chair x30 minutes with Modified [place].  Rolling to Bilateral with Supervision.   Supine to sit with Supervision. MET 10/12  Stand pivot transfers with Supervision.   Toilet transfer to bedside commode with Supervision. DC 10/11  Updated: Toilet t/f with Supervision. MET 10/12          3 goals met, 1 goal revised with Pt progressing towards (I).  JULIETTE Cooper  10/12/2017

## 2017-10-12 NOTE — PT/OT/SLP PROGRESS
"Occupational Therapy  Treatment    Jhonny Diana   MRN: [id number]   Admitting Diagnosis: Alcoholic hepatitis with ascites    OT Date of Treatment: 10/12/17   OT Start Time: 1251  OT Stop Time: 1320  OT Total Time (min): 29 min    Billable Minutes:  Self Care/Home Management 15 and Therapeutic Activity 14    General Precautions: Standard, fall  Orthopedic Precautions:    Braces:           Subjective:  Communicated with RN prior to session.  "That's it?. .. o thank you!"  "It's funny how it catches you like that (SOB, dizziness, fatigue)."  Pain/Comfort  Pain Rating 1: 0/10 ("tightness" in abdomen)  Pain Rating Post-Intervention 1: 0/10    Objective:  Patient found with: telemetry, peripheral IV     Functional Mobility:  Bed Mobility:   Supine>Sit: Supervision with HOB elevated and hand rail for support   Sit>Supine: NA   Scooting/Bridging: Sup to EOB    Transfers: (funcitonal sit/stand transfer circuit 1 set x 5 reps at EOB and chair)   Sit>Stand: SBA from EOB x5 trials with RW; SBA <>CGA from chair x5 trials with arm rest for support (RW present but not utilized)   Stand>Sit: Sup to sit at EOB x4 trials with RW, Sup to sit in chair x6 trials with arm rest for support (RW present but not utilized)   Toilet: Sup to sit, Mod (I) to stand with grab bar and RW     Pt performed functional mobility ~15ft with CGA>SBA and RW.      Activities of Daily Living:  Feeding: Eden drink from cup with straw seated EOB.  UE Dressing: Minimal Assistance don gown like robe with A for lines.  LE Dressing: Supervision doff/don socks seated in chair with 1 rest break.  Grooming: Stand-by Assistance to wash hands while standing at sink.  Toileting: Supervision  For clothing management from toilet; pt reported it was "a false alarm" and therefore, no elton care required        Balance:  Static Sitting:           good  Dynamic Sitting:      good  Static Standing:       fair+  Dynamic Standing:  fair+    Therapeutic Activities and " "Exercises:  Pt educated on safety with daily tasks OOB, and importance of participating in daily ax. Pt whiteboard updated.      AM-PAC 6 CLICK ADL   How much help from another person does this patient currently need?   1 = Unable, Total/Dependent Assistance  2 = A lot, Maximum/Moderate Assistance  3 = A little, Minimum/Contact Guard/Supervision  4 = None, Modified [place]/Independent    Putting on and taking off regular lower body clothing? : 4  Bathing (including washing, rinsing, drying)?: 2  Toileting, which includes using toilet, bedpan, or urinal? : 4  Putting on and taking off regular upper body clothing?: 3  Taking care of personal grooming such as brushing teeth?: 3  Eating meals?: 4  Total Score: 20     AM-PAC Raw Score CMS "G-Code Modifier Level of Impairment Assistance   6 % Total / Unable   7 - 8 CM 80 - 100% Maximal Assist   9-13 CL 60 - 80% Moderate Assist   14 - 19 CK 40 - 60% Moderate Assist   20 - 22 CJ 20 - 40% Minimal Assist   23 CI 1-20% SBA / CGA   24 CH 0% Independent/ Mod I       Patient left up in chair with all lines intact, call button in reach and RN notified    ASSESSMENT:  Jhonny Diana is a 27 y.o. male with a medical diagnosis of Alcoholic hepatitis with ascites.Pt tolerated session well and put forth good effort to participate. Pt is progressing with stability, t/f function and overall OOB Ax tolerance for daily care. Pt presented with decreased (I), endurance, stability and safety for ADLs, self-care and functional mobility. Pt will benefit from further OT in order to maximize (I) and safety for functional tasks.      Rehab identified problem list/impairments: Rehab identified problem list/impairments: impaired functional mobilty, weakness, gait instability, impaired endurance, impaired balance, impaired self care skills, edema    Rehab potential is good.    Activity tolerance: Good    Discharge recommendations: Discharge Facility/Level Of Care Needs: rehabilitation " facility     Barriers to discharge: Barriers to Discharge: Inaccessible home environment    Equipment recommendations:  (TBD)     GOALS:    Occupational Therapy Goals        Problem: Occupational Therapy Goal    Goal Priority Disciplines Outcome Interventions   Occupational Therapy Goal     OT, PT/OT Ongoing (interventions implemented as appropriate)    Description:  Goals to be met by: 10/17/2017    Patient will increase functional independence with ADLs by performing:    UE Dressing with Supervision.  LE Dressing with Supervision. MET 10/12  Grooming while seated with Supervision. DC 10/12  Updated: Grooming while standing sink side with Mod (I).  Toileting from bedside commode with Supervision for hygiene and clothing management.  MET 10/11 with satisfactory achievement at toilet  Sitting at edge of bed or bedside chair x30 minutes with Modified [place].  Rolling to Bilateral with Supervision.   Supine to sit with Supervision. MET 10/12  Stand pivot transfers with Supervision.   Toilet transfer to bedside commode with Supervision. DC 10/11  Updated: Toilet t/f with Supervision. MET 10/12                            Plan:  Patient to be seen 5 x/week to address the above listed problems via self-care/home management, community/work re-entry, therapeutic activities, therapeutic exercises  Plan of Care expires: 11/02/17  Plan of Care reviewed with: patient, family         JULIETTE Cooper  10/12/2017

## 2017-10-12 NOTE — ASSESSMENT & PLAN NOTE
· Appreciate Hepatology assistance  · Continue Lactulose and Rifaximin for HE  · Currently not transplant candidate per Psych and Hep.  Addiction Psych consulted  · IR para 10/9- 7.8 L fluid removed negative for SBP  · Stressed the importance of high calorie diet to prevent NGT insertion again - Boost TID  · Patient agreed to due alcohol rehab if patient was to get a liver transplant; we have initiated inpatient liver transplant evaluation; possible presentation on friday    MELD-Na score: 30 at 10/11/2017  4:40 AM  MELD score: 27 at 10/11/2017  4:40 AM  Calculated from:  Serum Creatinine: 1.5 mg/dL at 10/11/2017  4:40 AM  Serum Sodium: 129 mmol/L at 10/11/2017  4:40 AM  Total Bilirubin: 29.4 mg/dL at 10/11/2017  4:40 AM  INR(ratio): 1.4 at 10/11/2017  4:40 AM  Age: 27 years

## 2017-10-12 NOTE — PROGRESS NOTES
Pt sent down to IR for para.  Report given to IR nurse SISSY Parson.  VSS before leaving unit.  No issues noted.  Will monitor for pts return to the unit.

## 2017-10-12 NOTE — SUBJECTIVE & OBJECTIVE
Interval History: patient is feeling discomfort today with his abdomen; planning for IR paracentesis; dietary consulted and PPN started to aide in nutrition; will give one dose of lasix IV to see if that assists in patient's abdomoninal distention;   - has had 2 BMs already today    Review of Systems   Constitutional: Negative for chills, fatigue and fever.   HENT: Negative for sore throat and trouble swallowing.    Eyes: Negative for photophobia and visual disturbance.   Respiratory: Negative for cough and shortness of breath.    Cardiovascular: Negative for chest pain, palpitations and leg swelling.   Gastrointestinal: Positive for abdominal distention. Negative for abdominal pain, constipation, diarrhea, nausea and vomiting.   Endocrine: Negative for cold intolerance and heat intolerance.   Genitourinary: Negative for dysuria and frequency.   Musculoskeletal: Negative for arthralgias and myalgias.   Skin: Positive for color change. Negative for rash and wound.   Neurological: Negative for dizziness, syncope, weakness and light-headedness.   Psychiatric/Behavioral: Negative for confusion and hallucinations.   All other systems reviewed and are negative.    Objective:     Vital Signs (Most Recent):  Temp: 98.2 °F (36.8 °C) (10/11/17 1932)  Pulse: (!) 120 (10/11/17 1945)  Resp: 18 (10/11/17 1932)  BP: 131/60 (10/11/17 1932)  SpO2: 96 % (10/11/17 1932) Vital Signs (24h Range):  Temp:  [98.2 °F (36.8 °C)-99.3 °F (37.4 °C)] 98.2 °F (36.8 °C)  Pulse:  [107-120] 120  Resp:  [16-18] 18  SpO2:  [95 %-99 %] 96 %  BP: (115-134)/(57-67) 131/60     Weight: 82.3 kg (181 lb 7 oz)  Body mass index is 28.42 kg/m².    Intake/Output Summary (Last 24 hours) at 10/11/17 2025  Last data filed at 10/11/17 1700   Gross per 24 hour   Intake              620 ml   Output                0 ml   Net              620 ml      Physical Exam   Constitutional: He appears well-developed and well-nourished. He is cooperative.  Non-toxic appearance.  No distress.   HENT:   Head: Normocephalic and atraumatic.   Mouth/Throat: Oropharynx is clear and moist.   Eyes: EOM are normal. Pupils are equal, round, and reactive to light. Scleral icterus is present.   Neck: Normal range of motion. Neck supple.   Cardiovascular: Regular rhythm, normal heart sounds and normal pulses.  Tachycardia present.    No murmur heard.  Sinus tachycardia   Pulmonary/Chest: Effort normal. No respiratory distress. He has decreased breath sounds in the right lower field and the left lower field. He has no wheezes. He has no rales.   Abdominal: Soft. Normal appearance. He exhibits distension. He exhibits no mass. Bowel sounds are decreased. There is no tenderness. There is no rebound and no guarding. No hernia.   Musculoskeletal: Normal range of motion. He exhibits no edema.   Lymphadenopathy:     He has no cervical adenopathy.   Neurological: He is alert. He is not disoriented.   Oriented to person and place today   Skin: Skin is warm and dry. He is not diaphoretic.   Old blister/ wart on right plantar surface of the foot near the big toe   Psychiatric: He has a normal mood and affect. His behavior is normal. His affect is not labile. His speech is delayed. Cognition and memory are not impaired.   Nursing note and vitals reviewed.      Significant Labs:   CBC:     Recent Labs  Lab 10/10/17  0450 10/11/17  0440   WBC 24.13* 20.79*   HGB 9.0* 8.3*   HCT 25.7* 23.1*    161     CMP:     Recent Labs  Lab 10/10/17  0450 10/11/17  0440   * 129*   K 4.4 4.1    103   CO2 12* 14*    108   BUN 48* 46*   CREATININE 1.4 1.5*   CALCIUM 8.4* 8.4*   PROT 5.5* 5.6*   ALBUMIN 2.2* 2.5*   BILITOT 29.8* 29.4*   ALKPHOS 147* 143*   * 109*   ALT 62* 61*   ANIONGAP 12 12   EGFRNONAA >60.0 >60.0     Coagulation:     Recent Labs  Lab 10/11/17  0440   INR 1.4*     All pertinent labs within the past 24 hours have been reviewed.    Significant Imaging: CXR: I have reviewed all pertinent  results/findings within the past 24 hours and my personal findings are:  NG in appropriate position

## 2017-10-12 NOTE — H&P
Inpatient Radiology Pre-procedure Note    History of Present Illness:  Jhonny Diana is a 27 y.o. male who presents for ultrasound assisted paracentesis..  Admission H&P reviewed.  Past Medical History:   Diagnosis Date    Hypertension      Past Surgical History:   Procedure Laterality Date    APPENDECTOMY         Review of Systems:   As documented in primary team H&P    Home Meds:   Prior to Admission medications    Not on File     Scheduled Meds:    albumin human 25%  25 g Intravenous BID    heparin (porcine)  5,000 Units Subcutaneous Q8H    lactulose  30 g Oral Q6H    pantoprazole  40 mg Oral BID AC    rifAXImin  550 mg Oral BID    sodium bicarbonate  1,300 mg Per NG tube TID     Continuous Infusions:    Amino acid 4.25% - dextrose 5% (CLINIMIX-E) solution with additives (1L provides 42.5 gm AA, 50 gm CHO (170 kcal/L dextrose), Na 35, K 30, Mg 5, Ca 4.5, Acetate 70, Cl 39, Phos 15) 50 mL/hr at 10/11/17 1357     PRN Meds:hepatitis B virus vacc.rec(PF), influenza, ondansetron, pneumoc 13-arianne conj-dip cr(PF), tramadol  Anticoagulants/Antiplatelets: heparin    Allergies:   Review of patient's allergies indicates:   Allergen Reactions    Bactrim [sulfamethoxazole-trimethoprim] Other (See Comments)     Mookie Trell Syndrome     Sedation Hx: have not been any systemic reactions    Labs:    Recent Labs  Lab 10/12/17  0558   INR 1.5*       Recent Labs  Lab 10/12/17  0558   WBC 19.42*   HGB 7.6*   HCT 21.5*   *         Recent Labs  Lab 10/10/17  0450  10/12/17  0558     < > 111*   *  < > 126*   K 4.4  < > 3.6     < > 100   CO2 12*  < > 15*   BUN 48*  < > 51*   CREATININE 1.4  < > 1.8*   CALCIUM 8.4*  < > 8.6*   MG 2.0  < > 2.1   ALT 62*  < > 63*   *  < > 119*   ALBUMIN 2.2*  < > 2.7*   BILITOT 29.8*  < > 31.0*   BILIDIR >14.0*  --   --    < > = values in this interval not displayed.      Vitals:  Temp: 98.4 °F (36.9 °C) (10/12/17 0741)  Pulse: (!) 111 (10/12/17 0831)  Resp:  18 (10/12/17 0831)  BP: 127/64 (10/12/17 0831)  SpO2: 97 % (10/12/17 0744)     Physical Exam:  ASA: 2  Mallampati: II    General: no acute distress  Mental Status: alert and oriented to person, place and time  HEENT: normocephalic, atraumatic  Chest: unlabored breathing  Abdomen: distended  Extremity: moves all extremities    Plan: ultrasound assisted paracentesis  Sedation Plan: local lidocaine    Tio Vasquez MD  Resident  Department of Radiology  Pager: [phone]

## 2017-10-12 NOTE — PROCEDURES
Radiology Post-Procedure Note    Pre Op Diagnosis: Ascites  Post Op Diagnosis: Same    Procedure: Paracentesis    Procedure performed by: Keisha TEJADA, Jared Vasquez    Written Informed Consent Obtained: Yes  Specimen Removed: 1.8L serous fluid  Estimated Blood Loss: Minimal    Findings:   Successful paracentesis.  Albumin administered PRN per protocol.    Patient tolerated procedure well.    Tio Vasquez MD  Resident  Department of Radiology  Pager: [phone]

## 2017-10-12 NOTE — PROGRESS NOTES
SW received return phone call from pt's sister Miranda Diana ([phone]) regarding commitment as a backup caregiver. SW reviewed caregiver role and responsibilities and was able get a verbal commitment from pt's sister that she could assist if pt's mother unable to perform caregiver duties. EZEKIEL will notify liver transplant EZEKIEL Steward LMSW, as she evaluated the patient earlier this week. EZEKIEL remains available.

## 2017-10-12 NOTE — NURSING
PRE EDUCATION TEACHING NOTE    Jhonny Diana was seen in the hospital today. Patient's mother was at his bedside. Handbook on pre-liver transplant information (see outline below) was given to the patient and time was allowed for questions. Patient was not able to stay awake for the session.  This education focused on patient's mother.     Informed consent signed by patient's mother due to patient's mental status  and written information given on selection criteria.      LIVER TRANSPLANT WORK-UP EDUCATION  I. NATIONAL REGISTRY LISTING  A. Information for listing  B. Regions  C. Per UNOS, can be listed at more than one center  II. SURGERY  A. Length  B. Complications: bleeding, infection  C. Central lines, drains, Garcia catheter, incision, endotracheal tube, NG tube  D. Transfusions, cell saver  III. SHORT TERM RECOVERY  A. ICU, PICU, Hospital stay  IV. LONG TERM RECOVERY  A. Labs at home  B. Clinic visits  C. Complications: infection, rejection, readmissions  D. Normal immunity and immunosuppression  E. Incidence of re-admit in 1st year  V. REJECTION  A. Incidence  B. Treatment: Solumedrol, Prograf, Thymoglobulin (actions and side effects)  VI. IMMUNOSUPPRESSIVES  A. Prednisone  B. Imuran/Cellcept  C. Cyclosporin/Prograf  D. Rapamune  E. Need for lifetime compliance  F. Actions and side effects  G. Costs  VII. RECURRENCE OF VIRAL HEPATITIS

## 2017-10-12 NOTE — CONSULTS
Consult Note  Liver Transplant Surgery    Consult Requested By: Lubna Fnie RN  Reason for Consult: Liver transplant evaluation    SUBJECTIVE:     History of Present Illness: Patient is a 27 y.o. male with liver disease due to alcoholic liver disease.  Symptoms and complications of liver disease include ascites (recurrent paracentesis needed), edema, encephalopathy, esophageal or gastric varices, fatigue, hyponatremia, jaundice, muscle wasting, portal hypertension, thrombocytopenia and variceal bleeding.     Scheduled Meds:   albumin human 25%  25 g Intravenous TID    heparin (porcine)  5,000 Units Subcutaneous Q8H    lactulose  30 g Oral Q6H    midodrine  2.5 mg Oral TID    octreotide  100 mcg Intravenous Q8H    pantoprazole  40 mg Oral BID AC    rifAXImin  550 mg Oral BID    sodium bicarbonate  1,300 mg Per NG tube TID     Continuous Infusions:   Amino acid 4.25% - dextrose 5% (CLINIMIX-E) solution with additives (1L provides 42.5 gm AA, 50 gm CHO (170 kcal/L dextrose), Na 35, K 30, Mg 5, Ca 4.5, Acetate 70, Cl 39, Phos 15) 50 mL/hr at 10/11/17 1357     PRN Meds:hepatitis B virus vacc.rec(PF), influenza, ondansetron, pneumoc 13-arianne conj-dip cr(PF), tramadol    Review of patient's allergies indicates:   Allergen Reactions    Bactrim [sulfamethoxazole-trimethoprim] Other (See Comments)     Mookie Trell Syndrome       Past Medical History:   Diagnosis Date    Hypertension      Past Surgical History:   Procedure Laterality Date    APPENDECTOMY       History reviewed. No pertinent family history.  Social History   Substance Use Topics    Smoking status: Former Smoker     Types: Cigarettes    Smokeless tobacco: Never Used    Alcohol use Yes      Comment: a fifth of liquor daily for years, cut back over last 2 months        Review of Systems:  Constitutional: no fever or chills, positive for fatigue  Eyes: no visual changes, positive for icterus  Respiratory: positive for dyspnea on  exertion  Cardiovascular: no chest pain or palpitations, positive for fatigue and lower extremity edema  Gastrointestinal: positive for abdominal pain and jaundice, negative for bright red blood per rectum, change in bowel habits, constipation and diarrhea  Neurological: no seizures or tremors    OBJECTIVE:     Vital Signs (Most Recent)  Temp: 99 °F (37.2 °C) (10/12/17 1146)  Pulse: (!) 111 (10/12/17 1146)  Resp: 20 (10/12/17 1146)  BP: 130/64 (10/12/17 1146)  SpO2: 96 % (10/12/17 1146)    Vital Signs Range (Last 24H):  Temp:  [98.2 °F (36.8 °C)-99 °F (37.2 °C)]   Pulse:  [107-120]   Resp:  [16-20]   BP: (121-136)/(58-66)   SpO2:  [95 %-99 %]     Physical Exam:  General: well developed, fatigued, icteric, mild distress  Eyes: positive findings: sclera icteric   Lungs:  normal respiratory effort and diminished breath sounds bibasilar  Cardiovascular: Heart: regular rate and rhythm, S1, S2 normal, no murmur, click, rub or gallop. Chest Wall: no tenderness. Extremities: no cyanosis or edema, or clubbing and edema on both lower extremities. Pulses: 2+ and symmetric.  Abdomen/Rectal: Abdomen: abnormal findings:  ascites, distended and shifting dullness. Rectal: not examined  Skin: jaundice noted  Neurologic: Normal strength and tone. No focal numbness or weakness  Mental status: Alert, oriented, thought content appropriate    Laboratory:  CBC:   Recent Labs  Lab 10/12/17  0558   WBC 19.42*   RBC 2.02*   HGB 7.6*   HCT 21.5*        CMP:   Recent Labs  Lab 10/12/17  0558   *   CALCIUM 8.6*   ALBUMIN 2.7*   PROT 5.6*   *   K 3.6   CO2 15*      BUN 51*   CREATININE 1.8*   ALKPHOS 125   ALT 63*   *   BILITOT 31.0*     Coagulation:   Recent Labs  Lab 10/12/17  0558   INR 1.5*         ASSESSMENT/PLAN:     Patient Active Problem List   Diagnosis    Alcoholic hepatitis with ascites    Alcoholism /alcohol abuse    Hepatic encephalopathy    Acute renal failure with tubular necrosis    History  of hematemesis    Hyponatremia    Increased anion gap metabolic acidosis    Anasarca    Decompensated hepatic cirrhosis with ascites and HE    Acute alcoholic pancreatitis    Metabolic acidosis with normal anion gap and bicarbonate losses    Leukocytosis    Coagulopathy    Severe malnutrition    Tachycardia    Ascites due to alcoholic cirrhosis       Transplant Candidacy: Patient is a 27 y.o. year old male with alcoholic liver disease being evaluated for possible OLT.  In my opinion, he is a suitable liver transplant candidate.  He will be presented to selection committee after all tests and evaluations are complete.      UNOS Patient Status  Functional Status: 30% - Severely disabled: hospitalization is indicated, death not imminent  Physical Capacity: Limited Mobility      Counseling: I discussed with the patient the benefits of liver transplantation.  We discussed the evaluation and listing procedures.  We discussed the MELD system and the associated waiting times.  We discussed national and center specific survival results.  We discussed the option of being multiply listed in different OPOs.   We discussed the risks, benefits and potential complications related to surgery including the risks related to anesthesia, bleeding, infection, primary non-function of the allograft, the risk of reoperation as well as the risk of death.  We discussed the typical post-operative course, length of hospitalization, the long-term use of immunosuppressive therapy as well as the need for long-term routine follow-up.    PHS: I discussed the use of organs from donors with PHS increased-risk behavior, including the testing protocols utilized, as well as data from the literature regarding the likelihood of transmission of hepatitis or HIV.  The patient that he is willing to consider such grafts.  DCD: I discussed the use of organs recovered by donation after cardiac death (DCD), including slightly decreased graft survival  and greater risk of arterial and biliary complications. The potential advantage to the recipient is possibly receiving a transplant sooner by accepting such an organ. The patient that he is willing to consider such grafts.  HBcAb: I discussed the use of organs from donors with HBcAb in conjunction with long term use of HBV antiviral drugs, such as lamivudine. The small but measurable risk of hepatitis B seroconversion was discussed as well as the potentially life long need to continue antiviral drugs. The patient that he is willing to consider such grafts.

## 2017-10-12 NOTE — PROGRESS NOTES
Dr. Carrasco notified pt w/  PPN order.  New order to be placed by MD.    Will continue to monitor.

## 2017-10-12 NOTE — PROGRESS NOTES
Paracentesis complete, 1.8 L Removed, pt tolerates well.  Labs sent per order, report called to Halie SHEEHAN.  Pt awaiting transport to room.

## 2017-10-12 NOTE — PLAN OF CARE
Problem: Patient Care Overview  Goal: Plan of Care Review  Outcome: Ongoing (interventions implemented as appropriate)  Pt free from falls. Pt wears non slip socks when ambulating. Pt bed low and locked position. Pt afebrile. Pt IV site without redness or edema.  Pt has denied any pain or discomfort this shift. Mother at bedside and attentive to needs. TPN at 50ml/hr. Pt still has no appetite.

## 2017-10-13 ENCOUNTER — COMMITTEE REVIEW (OUTPATIENT)
Dept: TRANSPLANT | Facility: CLINIC | Age: 27
End: 2017-10-13

## 2017-10-13 PROBLEM — N17.9 AKI (ACUTE KIDNEY INJURY): Status: ACTIVE | Noted: 2017-10-13

## 2017-10-13 LAB
ABO + RH BLD: NORMAL
ALBUMIN SERPL BCP-MCNC: 2.8 G/DL
ALP SERPL-CCNC: 124 U/L
ALT SERPL W/O P-5'-P-CCNC: 64 U/L
ANION GAP SERPL CALC-SCNC: 13 MMOL/L
AST SERPL-CCNC: 141 U/L
BASOPHILS # BLD AUTO: 0.03 K/UL
BASOPHILS NFR BLD: 0.2 %
BILIRUB SERPL-MCNC: 31.7 MG/DL
BILIRUB UR QL STRIP: ABNORMAL
BLD GP AB SCN CELLS X3 SERPL QL: NORMAL
BUN SERPL-MCNC: 53 MG/DL
CALCIUM SERPL-MCNC: 8.5 MG/DL
CHLORIDE SERPL-SCNC: 99 MMOL/L
CLARITY UR REFRACT.AUTO: ABNORMAL
CO2 SERPL-SCNC: 14 MMOL/L
COLOR UR AUTO: ABNORMAL
CREAT SERPL-MCNC: 1.9 MG/DL
DIFFERENTIAL METHOD: ABNORMAL
EOSINOPHIL # BLD AUTO: 0.1 K/UL
EOSINOPHIL NFR BLD: 0.8 %
ERYTHROCYTE [DISTWIDTH] IN BLOOD BY AUTOMATED COUNT: 15 %
EST. GFR  (AFRICAN AMERICAN): 54.3 ML/MIN/1.73 M^2
EST. GFR  (NON AFRICAN AMERICAN): 46.9 ML/MIN/1.73 M^2
GLUCOSE SERPL-MCNC: 141 MG/DL
GLUCOSE UR QL STRIP: NEGATIVE
HCT VFR BLD AUTO: 20.6 %
HGB BLD-MCNC: 7.4 G/DL
HGB UR QL STRIP: NEGATIVE
INR PPP: 1.5
KETONES UR QL STRIP: NEGATIVE
LACTATE SERPL-SCNC: 1.4 MMOL/L
LEUKOCYTE ESTERASE UR QL STRIP: NEGATIVE
LYMPHOCYTES # BLD AUTO: 0.8 K/UL
LYMPHOCYTES NFR BLD: 5.1 %
MAGNESIUM SERPL-MCNC: 2.2 MG/DL
MCH RBC QN AUTO: 38.1 PG
MCHC RBC AUTO-ENTMCNC: 35.9 G/DL
MCV RBC AUTO: 106 FL
MONOCYTES # BLD AUTO: 1.8 K/UL
MONOCYTES NFR BLD: 11.1 %
NEUTROPHILS # BLD AUTO: 13.1 K/UL
NEUTROPHILS NFR BLD: 82.4 %
NITRITE UR QL STRIP: NEGATIVE
PH UR STRIP: 5 [PH] (ref 5–8)
PHOSPHATE SERPL-MCNC: 3.4 MG/DL
PLATELET # BLD AUTO: 165 K/UL
PMV BLD AUTO: 11.1 FL
POTASSIUM SERPL-SCNC: 3.8 MMOL/L
PROCALCITONIN SERPL IA-MCNC: 1.83 NG/ML
PROT SERPL-MCNC: 5.6 G/DL
PROT UR QL STRIP: NEGATIVE
PROTHROMBIN TIME: 15.6 SEC
RBC # BLD AUTO: 1.94 M/UL
SODIUM SERPL-SCNC: 126 MMOL/L
SP GR UR STRIP: 1.01 (ref 1–1.03)
URN SPEC COLLECT METH UR: ABNORMAL
UROBILINOGEN UR STRIP-ACNC: 2 EU/DL
WBC # BLD AUTO: 15.92 K/UL

## 2017-10-13 PROCEDURE — 25000003 PHARM REV CODE 250: Mod: NTX | Performed by: STUDENT IN AN ORGANIZED HEALTH CARE EDUCATION/TRAINING PROGRAM

## 2017-10-13 PROCEDURE — 83735 ASSAY OF MAGNESIUM: CPT | Mod: NTX

## 2017-10-13 PROCEDURE — 99233 SBSQ HOSP IP/OBS HIGH 50: CPT | Mod: NTX,,, | Performed by: HOSPITALIST

## 2017-10-13 PROCEDURE — 83605 ASSAY OF LACTIC ACID: CPT | Mod: NTX

## 2017-10-13 PROCEDURE — 36415 COLL VENOUS BLD VENIPUNCTURE: CPT | Mod: NTX

## 2017-10-13 PROCEDURE — 20600001 HC STEP DOWN PRIVATE ROOM: Mod: NTX

## 2017-10-13 PROCEDURE — 81003 URINALYSIS AUTO W/O SCOPE: CPT | Mod: NTX

## 2017-10-13 PROCEDURE — 25000003 PHARM REV CODE 250: Mod: NTX | Performed by: HOSPITALIST

## 2017-10-13 PROCEDURE — 86920 COMPATIBILITY TEST SPIN: CPT | Mod: NTX

## 2017-10-13 PROCEDURE — 86900 BLOOD TYPING SEROLOGIC ABO: CPT | Mod: NTX

## 2017-10-13 PROCEDURE — 84100 ASSAY OF PHOSPHORUS: CPT | Mod: NTX

## 2017-10-13 PROCEDURE — 63600175 PHARM REV CODE 636 W HCPCS: Mod: NTX | Performed by: NURSE PRACTITIONER

## 2017-10-13 PROCEDURE — 63600175 PHARM REV CODE 636 W HCPCS: Mod: NTX | Performed by: HOSPITALIST

## 2017-10-13 PROCEDURE — 85610 PROTHROMBIN TIME: CPT | Mod: NTX

## 2017-10-13 PROCEDURE — 97110 THERAPEUTIC EXERCISES: CPT | Mod: NTX

## 2017-10-13 PROCEDURE — 99233 SBSQ HOSP IP/OBS HIGH 50: CPT | Mod: NTX,,, | Performed by: INTERNAL MEDICINE

## 2017-10-13 PROCEDURE — 87040 BLOOD CULTURE FOR BACTERIA: CPT | Mod: NTX

## 2017-10-13 PROCEDURE — 97530 THERAPEUTIC ACTIVITIES: CPT | Mod: NTX

## 2017-10-13 PROCEDURE — 84145 PROCALCITONIN (PCT): CPT | Mod: NTX

## 2017-10-13 PROCEDURE — 99232 SBSQ HOSP IP/OBS MODERATE 35: CPT | Mod: NTX,,, | Performed by: INTERNAL MEDICINE

## 2017-10-13 PROCEDURE — 85025 COMPLETE CBC W/AUTO DIFF WBC: CPT | Mod: NTX

## 2017-10-13 PROCEDURE — 80053 COMPREHEN METABOLIC PANEL: CPT | Mod: NTX

## 2017-10-13 PROCEDURE — 25000003 PHARM REV CODE 250: Mod: NTX | Performed by: INTERNAL MEDICINE

## 2017-10-13 PROCEDURE — 86901 BLOOD TYPING SEROLOGIC RH(D): CPT | Mod: NTX

## 2017-10-13 PROCEDURE — 99231 SBSQ HOSP IP/OBS SF/LOW 25: CPT | Mod: NTX,,, | Performed by: INTERNAL MEDICINE

## 2017-10-13 PROCEDURE — 87086 URINE CULTURE/COLONY COUNT: CPT | Mod: NTX

## 2017-10-13 RX ORDER — SUCRALFATE 1 G/10ML
1 SUSPENSION ORAL EVERY 6 HOURS
Status: DISCONTINUED | OUTPATIENT
Start: 2017-10-13 | End: 2017-10-20

## 2017-10-13 RX ORDER — FLUCONAZOLE 200 MG/1
200 TABLET ORAL DAILY
Status: DISCONTINUED | OUTPATIENT
Start: 2017-10-13 | End: 2017-10-20

## 2017-10-13 RX ORDER — LACTULOSE 10 G/15ML
30 SOLUTION ORAL 2 TIMES DAILY
Status: DISCONTINUED | OUTPATIENT
Start: 2017-10-13 | End: 2017-10-14

## 2017-10-13 RX ADMIN — PANTOPRAZOLE SODIUM 40 MG: 40 TABLET, DELAYED RELEASE ORAL at 04:10

## 2017-10-13 RX ADMIN — AMPICILLIN SODIUM AND SULBACTAM SODIUM 3 G: 2; 1 INJECTION, POWDER, FOR SOLUTION INTRAMUSCULAR; INTRAVENOUS at 06:10

## 2017-10-13 RX ADMIN — SUCRALFATE 1 G: 1 SUSPENSION ORAL at 06:10

## 2017-10-13 RX ADMIN — RIFAXIMIN 550 MG: 550 TABLET ORAL at 09:10

## 2017-10-13 RX ADMIN — FLUCONAZOLE 200 MG: 200 TABLET ORAL at 10:10

## 2017-10-13 RX ADMIN — ONDANSETRON 4 MG: 2 INJECTION INTRAMUSCULAR; INTRAVENOUS at 09:10

## 2017-10-13 RX ADMIN — LACTULOSE 30 G: 20 SOLUTION ORAL at 09:10

## 2017-10-13 RX ADMIN — HEPARIN SODIUM 5000 UNITS: 5000 INJECTION, SOLUTION INTRAVENOUS; SUBCUTANEOUS at 05:10

## 2017-10-13 RX ADMIN — OCTREOTIDE ACETATE 100 MCG: 100 INJECTION, SOLUTION INTRAVENOUS; SUBCUTANEOUS at 05:10

## 2017-10-13 RX ADMIN — SUCRALFATE 1 G: 1 SUSPENSION ORAL at 02:10

## 2017-10-13 RX ADMIN — ONDANSETRON 4 MG: 2 INJECTION INTRAMUSCULAR; INTRAVENOUS at 03:10

## 2017-10-13 RX ADMIN — SODIUM BICARBONATE 650 MG TABLET 1300 MG: at 05:10

## 2017-10-13 RX ADMIN — HEPARIN SODIUM 5000 UNITS: 5000 INJECTION, SOLUTION INTRAVENOUS; SUBCUTANEOUS at 02:10

## 2017-10-13 RX ADMIN — HEPARIN SODIUM 5000 UNITS: 5000 INJECTION, SOLUTION INTRAVENOUS; SUBCUTANEOUS at 09:10

## 2017-10-13 RX ADMIN — SODIUM BICARBONATE 650 MG TABLET 1300 MG: at 09:10

## 2017-10-13 RX ADMIN — RIFAXIMIN 550 MG: 550 TABLET ORAL at 08:10

## 2017-10-13 RX ADMIN — VANCOMYCIN HYDROCHLORIDE 1500 MG: 10 INJECTION, POWDER, LYOPHILIZED, FOR SOLUTION INTRAVENOUS at 11:10

## 2017-10-13 RX ADMIN — SODIUM BICARBONATE 650 MG TABLET 1300 MG: at 02:10

## 2017-10-13 RX ADMIN — LACTULOSE 30 G: 20 SOLUTION ORAL at 05:10

## 2017-10-13 RX ADMIN — ASCORBIC ACID, VITAMIN A PALMITATE, CHOLECALCIFEROL, THIAMINE HYDROCHLORIDE, RIBOFLAVIN-5 PHOSPHATE SODIUM, PYRIDOXINE HYDROCHLORIDE, NIACINAMIDE, DEXPANTHENOL, ALPHA-TOCOPHEROL ACETATE, VITAMIN K1, FOLIC ACID, BIOTIN, CYANOCOBALAMIN: 200; 3300; 200; 6; 3.6; 6; 40; 15; 10; 150; 600; 60; 5 INJECTION, SOLUTION INTRAVENOUS at 10:10

## 2017-10-13 NOTE — SUBJECTIVE & OBJECTIVE
Interval History: patient had a paracentesis today with 1.8 L removed; still having decreased UOP; will continue PPN today as per dietary recs;   - will re-consult nephrology; planning on presenting patient tomorrow at liver transplant meeting    Review of Systems   Constitutional: Negative for chills, fatigue and fever.   HENT: Negative for sore throat and trouble swallowing.    Eyes: Negative for photophobia and visual disturbance.   Respiratory: Negative for cough and shortness of breath.    Cardiovascular: Negative for chest pain, palpitations and leg swelling.   Gastrointestinal: Positive for abdominal distention. Negative for abdominal pain, constipation, diarrhea, nausea and vomiting.   Endocrine: Negative for cold intolerance and heat intolerance.   Genitourinary: Negative for dysuria and frequency.   Musculoskeletal: Negative for arthralgias and myalgias.   Skin: Positive for color change. Negative for rash and wound.   Neurological: Negative for dizziness, syncope, weakness and light-headedness.   Psychiatric/Behavioral: Negative for confusion and hallucinations.   All other systems reviewed and are negative.    Objective:     Vital Signs (Most Recent):  Temp: 98.1 °F (36.7 °C) (10/12/17 1922)  Pulse: (!) 111 (10/12/17 1922)  Resp: (!) 24 (10/12/17 1922)  BP: 127/63 (10/12/17 1922)  SpO2: 98 % (10/12/17 1922) Vital Signs (24h Range):  Temp:  [98.1 °F (36.7 °C)-99.5 °F (37.5 °C)] 98.1 °F (36.7 °C)  Pulse:  [106-114] 111  Resp:  [16-24] 24  SpO2:  [96 %-99 %] 98 %  BP: (121-136)/(58-76) 127/63     Weight: 91.6 kg (202 lb)  Body mass index is 31.64 kg/m².    Intake/Output Summary (Last 24 hours) at 10/12/17 2115  Last data filed at 10/12/17 1700   Gross per 24 hour   Intake          1493.33 ml   Output             2025 ml   Net          -531.67 ml      Physical Exam   Constitutional: He appears well-developed and well-nourished. He is cooperative.  Non-toxic appearance. No distress.   HENT:   Head:  Normocephalic and atraumatic.   Mouth/Throat: Oropharynx is clear and moist.   Eyes: EOM are normal. Pupils are equal, round, and reactive to light. Scleral icterus is present.   Neck: Normal range of motion. Neck supple.   Cardiovascular: Regular rhythm, normal heart sounds and normal pulses.  Tachycardia present.    No murmur heard.  Sinus tachycardia   Pulmonary/Chest: Effort normal. No respiratory distress. He has decreased breath sounds in the right lower field and the left lower field. He has no wheezes. He has no rales.   Abdominal: Soft. Normal appearance. He exhibits distension. He exhibits no mass. Bowel sounds are decreased. There is no tenderness. There is no rebound and no guarding. No hernia.   Musculoskeletal: Normal range of motion. He exhibits no edema.   Lymphadenopathy:     He has no cervical adenopathy.   Neurological: He is alert. He is not disoriented.   Oriented to person and place today   Skin: Skin is warm and dry. He is not diaphoretic.   Old blister/ wart on right plantar surface of the foot near the big toe   Psychiatric: He has a normal mood and affect. His behavior is normal. His affect is not labile. His speech is delayed. Cognition and memory are not impaired.   Nursing note and vitals reviewed.      Significant Labs:   CBC:     Recent Labs  Lab 10/11/17  0440 10/12/17  0558   WBC 20.79* 19.42*   HGB 8.3* 7.6*   HCT 23.1* 21.5*    158     CMP:     Recent Labs  Lab 10/11/17  0440 10/12/17  0558   * 126*   K 4.1 3.6    100   CO2 14* 15*    111*   BUN 46* 51*   CREATININE 1.5* 1.8*   CALCIUM 8.4* 8.6*   PROT 5.6* 5.6*   ALBUMIN 2.5* 2.7*   BILITOT 29.4* 31.0*   ALKPHOS 143* 125   * 119*   ALT 61* 63*   ANIONGAP 12 11   EGFRNONAA >60.0 50.1*     Coagulation:     Recent Labs  Lab 10/12/17  0558   INR 1.5*     All pertinent labs within the past 24 hours have been reviewed.    Significant Imaging: CXR: I have reviewed all pertinent results/findings within the  past 24 hours and my personal findings are:  NG in appropriate position

## 2017-10-13 NOTE — COMMITTEE REVIEW
Jhonny Diana's case presented to selection committee.  Patient has been accepted for liver transplant due to Acute Alcoholic Hepatitis and complications of end stage liver disease including hyperbilirubinemia, hypoalbuminemia, coagulopathy, severe malnutrition, encephalopathy, jaundice, hyponatremia, portal hypertension, splenomegaly, thrombocytopenia and esophageal varices with a MELD score of 34.  Patient has no absolute contraindications for liver transplant.  Patient will be listed pending financial approval.    Patient will accept HBcAb positive livers.  Patient will not accept HCVAB positive livers.  Patient will accept DCD livers.  Patient will not accept HCV ANITA positive livers  Patient will accept HBV NAITA positive livers  I was present at the committee meeting and attest to the decision of the committee.    Elian Therapondos  10/13/2017

## 2017-10-13 NOTE — PROGRESS NOTES
"[facility] Hospital Medicine  Progress Note    Patient Name: Jhonny Diana  MRN: [id number]  Patient Class: IP- Inpatient   Admission Date: 9/27/2017  Length of Stay: 15 days  Attending Physician: Armand Carrasco MD  Primary Care Provider: Provider [facility] Medicine Team: [facility] HOSP MED A Armand Carrasco MD    Subjective:     Principal Problem:Alcoholic hepatitis with ascites    HPI:  26 yo male with hx of childhood asthma and heavy alcohol abuse (fifth of liquor daily since teenager, last drink 2.5 wks ago per mother) who was transferred to [facility] for higher level of care/liver transplant evaluation. Patient initially presented to OSH for a 2 mo hx of progressively worsening abdominal pain with associated fatigue, jaundice, and abdominal distention with ~20# weight gain. HPI given by patient's mother and OSH records as patient's participation in interview limited by previous administration of pain medication at OSH.      Per mother, patient did not have a fever, chills, dark stools, known sick contacts, nausea, vomiting, diarrhea, confusion, or HA. Reports family hx of alcoholic cirrhosis in father. Pt was recently diagnosed with alcoholic hepatitis during his admission at OSH on 9/6-9/15. During that hospitalization, patient was given ceftriaxone for SBP ppx, prednisone for alcoholic hepatitis, and diuresed. Pt was discharged and instructed to follow up with PCP and AA within 1 wk. After his discharge, patient returned to ED 4 days after for increased weight gain and worsening abdominal pain/jaundice. At OSH, patient was given fluids (for suspected pancreatitis), diuresed and given IV albumin. Two days prior to transfer to [facility], patient had an episode of hematemesis and underwent an EGD on 9/25 that revealed "1 sm varix in distal esophagus, no stigmata of bleeding, gastropathy of portal hypertension, and reflux esophagitis".  No further episodes of hematemesis noted since initial episode. "      Recent labs at OSH: Na 134, CO2 18, BUN 36, Cr 2, Tprot 5.9, albumin 1.9, Tbili 24, alk phos 83, , ALT51. Patient also had lipase 532 and ammonia 118 during his previous hospitalization at OSH. Labwork also notable for negative mitochondrial ab, sm muscle ab, and ceruloplasmin 22. Imaging at OSH revealing abdo U/S (9/20): hepatosplenomegaly, CT abdo w contrast: hepatomegaly with marked fatty infiltration of the liver and mild ascites (9/6). No CDs available for review.    Hospital Course:  Mr. Diana was admitted to the CMICU on 9/27 for decompensated alcoholic hepatitis with high MELD score, severe HE, and LAURA. Hepatology consulted and following to evaluate liver transplant candidacy. Patient not currently a transplant candidate due to recent alcohol use; recommending re-evaluation by addiction psych and social work when more medically stable and able to participate. Pt started on albumin and lasix for LAURA and possibility for HRS. However nephrology felt that LAURA is likely multifactorial: ATN given hypotensive episodes at OSH and toxic given severe hyperbilirubinemia; not truly HRS. Lactulose/rifaximin started for HE and mental status improving slowly. Mr. Diana has had significant ABD pain since admission with ABD distension. CT abdomen was significant for pericolonic fat stranding of right colon; no obstruction. Also with diffuse mesenteric edema. No significant ascites noted, no cholecystitis/cholelithiasis/ductal dilation. US liver with doppler showed appropriate vasculature.  He was stepped down to Hospital Medicine on 10/5.  Paracentesis was done on 10/9 with 3.3L removed.  His diet was advanced and Dietary was consulted to help get adequate caloric intake.    Interval History: patient had a paracentesis today with 1.8 L removed; still having decreased UOP; will continue PPN today as per dietary recs;   - will re-consult nephrology; planning on presenting patient tomorrow at liver transplant  meeting    Review of Systems   Constitutional: Negative for chills, fatigue and fever.   HENT: Negative for sore throat and trouble swallowing.    Eyes: Negative for photophobia and visual disturbance.   Respiratory: Negative for cough and shortness of breath.    Cardiovascular: Negative for chest pain, palpitations and leg swelling.   Gastrointestinal: Positive for abdominal distention. Negative for abdominal pain, constipation, diarrhea, nausea and vomiting.   Endocrine: Negative for cold intolerance and heat intolerance.   Genitourinary: Negative for dysuria and frequency.   Musculoskeletal: Negative for arthralgias and myalgias.   Skin: Positive for color change. Negative for rash and wound.   Neurological: Negative for dizziness, syncope, weakness and light-headedness.   Psychiatric/Behavioral: Negative for confusion and hallucinations.   All other systems reviewed and are negative.    Objective:     Vital Signs (Most Recent):  Temp: 98.1 °F (36.7 °C) (10/12/17 1922)  Pulse: (!) 111 (10/12/17 1922)  Resp: (!) 24 (10/12/17 1922)  BP: 127/63 (10/12/17 1922)  SpO2: 98 % (10/12/17 1922) Vital Signs (24h Range):  Temp:  [98.1 °F (36.7 °C)-99.5 °F (37.5 °C)] 98.1 °F (36.7 °C)  Pulse:  [106-114] 111  Resp:  [16-24] 24  SpO2:  [96 %-99 %] 98 %  BP: (121-136)/(58-76) 127/63     Weight: 91.6 kg (202 lb)  Body mass index is 31.64 kg/m².    Intake/Output Summary (Last 24 hours) at 10/12/17 2115  Last data filed at 10/12/17 1700   Gross per 24 hour   Intake          1493.33 ml   Output             2025 ml   Net          -531.67 ml      Physical Exam   Constitutional: He appears well-developed and well-nourished. He is cooperative.  Non-toxic appearance. No distress.   HENT:   Head: Normocephalic and atraumatic.   Mouth/Throat: Oropharynx is clear and moist.   Eyes: EOM are normal. Pupils are equal, round, and reactive to light. Scleral icterus is present.   Neck: Normal range of motion. Neck supple.   Cardiovascular:  Regular rhythm, normal heart sounds and normal pulses.  Tachycardia present.    No murmur heard.  Sinus tachycardia   Pulmonary/Chest: Effort normal. No respiratory distress. He has decreased breath sounds in the right lower field and the left lower field. He has no wheezes. He has no rales.   Abdominal: Soft. Normal appearance. He exhibits distension. He exhibits no mass. Bowel sounds are decreased. There is no tenderness. There is no rebound and no guarding. No hernia.   Musculoskeletal: Normal range of motion. He exhibits no edema.   Lymphadenopathy:     He has no cervical adenopathy.   Neurological: He is alert. He is not disoriented.   Oriented to person and place today   Skin: Skin is warm and dry. He is not diaphoretic.   Old blister/ wart on right plantar surface of the foot near the big toe   Psychiatric: He has a normal mood and affect. His behavior is normal. His affect is not labile. His speech is delayed. Cognition and memory are not impaired.   Nursing note and vitals reviewed.      Significant Labs:   CBC:     Recent Labs  Lab 10/11/17  0440 10/12/17  0558   WBC 20.79* 19.42*   HGB 8.3* 7.6*   HCT 23.1* 21.5*    158     CMP:     Recent Labs  Lab 10/11/17  0440 10/12/17  0558   * 126*   K 4.1 3.6    100   CO2 14* 15*    111*   BUN 46* 51*   CREATININE 1.5* 1.8*   CALCIUM 8.4* 8.6*   PROT 5.6* 5.6*   ALBUMIN 2.5* 2.7*   BILITOT 29.4* 31.0*   ALKPHOS 143* 125   * 119*   ALT 61* 63*   ANIONGAP 12 11   EGFRNONAA >60.0 50.1*     Coagulation:     Recent Labs  Lab 10/12/17  0558   INR 1.5*     All pertinent labs within the past 24 hours have been reviewed.    Significant Imaging: CXR: I have reviewed all pertinent results/findings within the past 24 hours and my personal findings are:  NG in appropriate position    Assessment/Plan:      * Alcoholic hepatitis with ascites    · Appreciate Hepatology assistance  · Continue Lactulose and Rifaximin for HE  · Currently not  transplant candidate per Psych and Hep.  Addiction Psych consulted  · IR para 10/9- 7.8 L fluid removed negative for SBP  · Stressed the importance of high calorie diet to prevent NGT insertion again - Boost TID  · Patient agreed to due alcohol rehab if patient was to get a liver transplant; we have initiated inpatient liver transplant evaluation;  presentation on friday    MELD-Na score: 34 at 10/12/2017  5:58 AM  MELD score: 30 at 10/12/2017  5:58 AM  Calculated from:  Serum Creatinine: 1.8 mg/dL at 10/12/2017  5:58 AM  Serum Sodium: 126 mmol/L at 10/12/2017  5:58 AM  Total Bilirubin: 31.0 mg/dL at 10/12/2017  5:58 AM  INR(ratio): 1.5 at 10/12/2017  5:58 AM  Age: 27 years            Ascites due to alcoholic cirrhosis    As above          Severe malnutrition    · SLP consulted and ok for dental soft diet  · Dietary consulted  · Stressed the importance of eating high protein high calorie to prevent NGT re-insertion with TF  · Calorie counts and Boost TID  · Started patient on PPN on 10/11        Coagulopathy    · 2/2 liver disease  · No bleeding; Monitor          Leukocytosis    · WBC remains elevated - stable at 26  · Remains afebrile  · Repeat blood cultures NGTD  · UA negative  · Resp cx ESBL Klebsiella.  ID following.  Will discuss antibiotics        Metabolic acidosis with normal anion gap and bicarbonate losses    · 2/2 diarrhea given lactulose.  Currently on 10g daily with improving BMs  · Still acidotic, will monitor  · Sodium Bicarb TID          Decompensated hepatic cirrhosis with ascites and HE    See alcoholic hepatitis with ascites          Anasarca    · 2/2 liver disease and hypoalbuminemia          Hyponatremia    · Sodium 126 today, will fluid restrict to 1L and give albumin          History of hematemesis    · Pt had an episode of hematemesis at OSH and EGD that revealed 1 small varix, reflux esophagitis, and gastropathy of portal hypertension  · No evidence of bleeding currently and relatively  stable hemgolobin  · Continue Pantoprazole BID        Acute renal failure with tubular necrosis    · Appreciate Nephro's help  · Suspect LAURA multifactorial: ischemic ATN given hypotension and injury due to hyperbilirubinemia. Felt less likely to be HRS however have continued albumin with lasix  · Overall, LAURA improving and making adequate urine.  Not measuring amounts as he pulls off condom catheter and is in diapers - but mother reports frequent wet diapers    10/12- renal function worsening, will re-consult nephrology; adding octreotide; strict I/O; decrease UOP        Hepatic encephalopathy    · Mentation improving  · Continue Lactulose and Rifaxmin    10/10 - mentation AAO times 4; only one BM today; will schedule lactulose today          Alcoholism /alcohol abuse    · Reported about a fifth of whiskey daily since teenager  · Appreciate Psych and Hep assistance; currently high risk for liver transplant.  Addiction Psych reconsulted, state prognosis is gaurded however would need ABU if transplant is need urgently which patient has agreed to do  · PETH 174 on admit, 9/27          VTE Risk Mitigation         Ordered     heparin (porcine) injection 5,000 Units  Every 8 hours     Route:  Subcutaneous        10/03/17 1740     Place sequential compression device  Until discontinued      09/28/17 0238     Medium Risk of VTE  Once      09/27/17 8761              Armand Carrasco MD  Department of Hospital Medicine   [facility]

## 2017-10-13 NOTE — CONSULTS
[facility]  Infectious Disease  Consult Note    Patient Name: Jhonny Diana  MRN: [id number]  Admission Date: 9/27/2017  Hospital Length of Stay: 16 days  Attending Physician: Armand Carrasco MD  Primary Care Provider: Provider [facility]     Isolation Status: Contact    Patient information was obtained from patient, parent and ER records.      Consults  Assessment/Plan:     Leukocytosis    Patient evaluated today found in no acute distress. Patient with positive ESBL in sputum with bilateral consolidates in CXR. On evaluation patient resting comfortable with no respiratory distress. Has not had interval increase in O2 requirement. No episodes of desaturation or BP instability. WBC with down trending pattern now at 15 previous at 30. Comparing past CXR patient with improvement from findings since treatment for pneumonia. At this time ESBL in sputum believed to be colonization of the airway and not an acute infectious process. Patient recently received immunization shots,  low grade fever can be associated. Would observe at this time and hold from treatment. Will monitor progress of patient and determine if deteriorate or develops respiratory symptoms to treat at that time.     - No treatment with antibiotics at this time  - will monitor progress              Thank you for your consult. I will follow-up with patient. Please contact us if you have any additional questions.    Bernarda Mena MD  Infectious Disease  [facility]    Subjective:     Principal Problem: Alcoholic hepatitis with ascites    HPI: Case of 26 y/o male PMHx BA and alcohol abuse until admition who was admitted on 9/27/17. Was transferred from [facility] due to 6-8 weeks progressively worsening fatigue, jaundice, abdominal pain, distension, BLE edema and 20lbs weight gain due to decompensated liver failure secondary to alcoholic cirrhosis recently diagnosed. ID originally consulted for evaluation of worsening  leukocytosis and transplant candidacy. Patient had an extensive infectious work up with no localizing source. Patient received 7 day course of Zosyn for pneumonia. Ascitic fluid not consistent with SBP, negative urine cultures, CXR improved from past. Patient received immunization ordered for transplant evaluation on 10/11/17 and 10/12/13. Had low grade fever of 100.3 and was reconsulted to ID at this time for additional recommendations regarding ESBL in sputum.        Past Medical History:   Diagnosis Date    Hypertension        Past Surgical History:   Procedure Laterality Date    APPENDECTOMY         Review of patient's allergies indicates:   Allergen Reactions    Bactrim [sulfamethoxazole-trimethoprim] Other (See Comments)     Mookie Trell Syndrome       Medications:  No prescriptions prior to admission.     Antibiotics     Start     Stop Route Frequency Ordered    10/13/17 1030  ampicillin-sulbactam 3 g in sodium chloride 0.9 % 100 mL IVPB (ready to mix system)      -- IV Every 6 hours (non-standard times) 10/13/17 1030    10/13/17 1000  vancomycin (VANCOCIN) 1,500 mg in dextrose 5 % 250 mL IVPB  (Vancomycin IVPB with levels panel)      -- IV Every 24 hours (non-standard times) 10/13/17 0848    09/30/17 2100  rifAXIMin tablet 550 mg      -- Oral 2 times daily 09/30/17 1452        Antifungals     Start     Stop Route Frequency Ordered    10/13/17 1000  fluconazole tablet 200 mg      -- Oral Daily 10/13/17 0848        Antivirals     None           Immunization History   Administered Date(s) Administered    Hepatitis A, Pediatric/Adolescent, 2 Dose 10/11/2017    Hepatitis B, Adult 10/12/2017    Influenza - High Dose 10/12/2017    Pneumococcal Conjugate - 13 Valent 10/12/2017    Tdap 10/11/2017       Family History     None        Social History     Social History    Marital status: Single     Spouse name: N/A    Number of children: N/A    Years of education: N/A     Social History Main Topics     Smoking status: Former Smoker     Types: Cigarettes    Smokeless tobacco: Never Used    Alcohol use Yes      Comment: a fifth of liquor daily for years, cut back over last 2 months    Drug use: No    Sexual activity: Not Asked     Other Topics Concern    None     Social History Narrative    None     Review of Systems   Constitutional: Negative for activity change, chills, diaphoresis, fatigue and fever.   HENT: Negative for congestion.    Respiratory: Negative for cough, choking, chest tightness and shortness of breath.    Gastrointestinal: Positive for abdominal distention and abdominal pain. Negative for diarrhea, nausea and vomiting.     Objective:     Vital Signs (Most Recent):  Temp: 100.2 °F (37.9 °C) (10/13/17 1227)  Pulse: (!) 112 (10/13/17 1500)  Resp: 16 (10/13/17 1227)  BP: (!) 128/59 (10/13/17 1227)  SpO2: 95 % (10/13/17 1227) Vital Signs (24h Range):  Temp:  [98.7 °F (37.1 °C)-100.6 °F (38.1 °C)] 100.2 °F (37.9 °C)  Pulse:  [] 112  Resp:  [16-24] 16  SpO2:  [95 %-98 %] 95 %  BP: (121-135)/(55-76) 128/59     Weight: 90.4 kg (199 lb 4.7 oz)  Body mass index is 31.21 kg/m².    Estimated Creatinine Clearance: 62.1 mL/min (based on SCr of 1.9 mg/dL (H)).    Physical Exam   Constitutional: He is oriented to person, place, and time.   HENT:   Head: Normocephalic and atraumatic.   Eyes: EOM are normal. Pupils are equal, round, and reactive to light. Scleral icterus is present.   Neck: Normal range of motion.   Cardiovascular: Regular rhythm and normal heart sounds.    No murmur heard.  Pulmonary/Chest: Effort normal and breath sounds normal.   Abdominal: Soft. Bowel sounds are normal. He exhibits distension. There is no tenderness.   Musculoskeletal: He exhibits edema. He exhibits no deformity.   Neurological: He is alert and oriented to person, place, and time.   Skin: No rash noted.       Significant Labs:   Bilirubin:   Recent Labs  Lab 10/06/17  0434 10/07/17  0403 10/08/17  0659 10/09/17  0436  10/10/17  0450 10/11/17  0440 10/12/17  0558 10/13/17  0543   BILIDIR >14.0* >14.0* >14.0* >14.0* >14.0*  --   --   --    BILITOT 29.8* 30.6* 32.9* 30.6* 29.8* 29.4* 31.0* 31.7*     Blood Culture:   Recent Labs  Lab 09/28/17  0030 09/28/17  0044 10/03/17  1015 10/03/17  1035   LABBLOO No growth after 5 days. No growth after 5 days. No growth after 5 days. No growth after 5 days.     BMP:   Recent Labs  Lab 10/13/17  0543   *   *   K 3.8   CL 99   CO2 14*   BUN 53*   CREATININE 1.9*   CALCIUM 8.5*   MG 2.2     CBC:   Recent Labs  Lab 10/12/17  0558 10/13/17  0543   WBC 19.42* 15.92*   HGB 7.6* 7.4*   HCT 21.5* 20.6*    165     Microbiology Results (last 7 days)     Procedure Component Value Units Date/Time    Blood culture [[id number]] Collected:  10/13/17 0934    Order Status:  Sent Specimen:  Blood Updated:  10/13/17 1113    Urine culture [[id number]] Collected:  10/13/17 0927    Order Status:  Sent Specimen:  Urine from Urine, Clean Catch Updated:  10/13/17 1020    Culture, Body Fluid - Bactec [[id number]] Collected:  10/05/17 1436    Order Status:  Completed Specimen:  Body Fluid from Ascites Updated:  10/10/17 1812     Body Fluid Culture, Sterile No growth after 5 days.    Culture, Respiratory with Gram Stain [[id number]]  (Susceptibility) Collected:  10/06/17 1921    Order Status:  Completed Specimen:  Respiratory from Sputum Updated:  10/09/17 1032     Respiratory Culture --     KLEBSIELLA PNEUMONIAE ESBL  Many       Gram Stain (Respiratory) <10 epithelial cells per low power field     Gram Stain (Respiratory) No WBC's     Gram Stain (Respiratory) Few Gram negative rods    Blood culture [[id number]] Collected:  10/03/17 1015    Order Status:  Completed Specimen:  Blood from Peripheral, Hand, Left Updated:  10/08/17 2012     Blood Culture, Routine No growth after 5 days.    Narrative:       Blood cultures x 2 different sites. 4 bottles total. Please  draw cultures before administering  antibiotics.    Blood culture [[id number]] Collected:  10/03/17 1035    Order Status:  Completed Specimen:  Blood from Peripheral, Hand, Right Updated:  10/08/17 1412     Blood Culture, Routine No growth after 5 days.    Narrative:       Blood cultures from 2 different sites. 4 bottles total.  Please draw before starting antibiotics.          Significant Imaging: I have reviewed all pertinent imaging results/findings within the past 24 hours.

## 2017-10-13 NOTE — PT/OT/SLP PROGRESS
Occupational Therapy  Treatment    Jhonny Diaan   MRN: [id number]   Admitting Diagnosis: Alcoholic hepatitis with ascites    OT Date of Treatment: 10/13/17   OT Start Time: 1530  OT Stop Time: 1556  OT Total Time (min): 26 min    Billable Minutes:  Therapeutic Activity 16 and Therapeutic Exercise 10    General Precautions: Standard, fall     Subjective:  Communicated with RN prior to session.    Pt agreeable to treatment    Pain/Comfort  Pain Rating 1: 0/10    Objective:  Patient found with: telemetry, peripheral IV     Functional Mobility:  Bed Mobility:  Scooting/Bridging: Stand by Assistance  Supine to Sit: Contact Guard Assistance  Sit to Supine: Contact Guard Assistance    Transfers:   Sit <> Stand Assistance: Stand By Assistance  Sit <> Stand Assistive Device: No Assistive Device    Functional Ambulation: CGA with RW within room    Activities of Daily Living:  Pt declined today 2* already completing    Balance:   Static Sit: GOOD  Dynamic Sit: GOOD   Static Stand: FAIR+   Dynamic stand: FAIR+     Therapeutic Activities and Exercises:  Pt performed AROM shoulder flexion and abduction x12 reps; triceps and biceps with green theraband x12 reps while standing with SBA    Pt paticipated in slingshot activity EOB by performing seated rows using green theraband to propel ball within room x5 minutes    Pt participated in ball tossing/catching activity in various planes while standing with CGA x5 minutes    AM-PAC 6 CLICK ADL   How much help from another person does this patient currently need?   1 = Unable, Total/Dependent Assistance  2 = A lot, Maximum/Moderate Assistance  3 = A little, Minimum/Contact Guard/Supervision  4 = None, Modified [place]/Independent    Putting on and taking off regular lower body clothing? : 3  Bathing (including washing, rinsing, drying)?: 2  Toileting, which includes using toilet, bedpan, or urinal? : 3  Putting on and taking off regular upper body clothing?: 3  Taking care of personal  "grooming such as brushing teeth?: 3  Eating meals?: 4  Total Score: 18     AM-PAC Raw Score CMS "G-Code Modifier Level of Impairment Assistance   6 % Total / Unable   7 - 8 CM 80 - 100% Maximal Assist   9-13 CL 60 - 80% Moderate Assist   14 - 19 CK 40 - 60% Moderate Assist   20 - 22 CJ 20 - 40% Minimal Assist   23 CI 1-20% SBA / CGA   24 CH 0% Independent/ Mod I       Patient left supine with all lines intact, call button in reach and RN notified    ASSESSMENT:  Jhonny Diana is a 27 y.o. male with a medical diagnosis of Alcoholic hepatitis with ascites and tolerated session well with good participation and would continue to benefit from OT services to maximize functional (I) and safety.    Rehab identified problem list/impairments: Rehab identified problem list/impairments: weakness, impaired endurance, gait instability, impaired functional mobilty, impaired self care skills, impaired balance, edema    Rehab potential is good.    Activity tolerance: Good    Discharge recommendations: Discharge Facility/Level Of Care Needs: rehabilitation facility     Barriers to discharge: Barriers to Discharge: Inaccessible home environment    Equipment recommendations:  (TBD)     GOALS:    Occupational Therapy Goals        Problem: Occupational Therapy Goal    Goal Priority Disciplines Outcome Interventions   Occupational Therapy Goal     OT, PT/OT Ongoing (interventions implemented as appropriate)    Description:  Goals to be met by: 10/17/2017    Patient will increase functional independence with ADLs by performing:    UE Dressing with Supervision.  LE Dressing with Supervision. MET 10/12  Grooming while seated with Supervision. DC 10/12  Updated: Grooming while standing sink side with Mod (I).  Toileting from bedside commode with Supervision for hygiene and clothing management.  MET 10/11 with satisfactory achievement at toilet  Sitting at edge of bed or bedside chair x30 minutes with Modified [place].  Rolling to " Bilateral with Supervision.   Supine to sit with Supervision. MET 10/12  Stand pivot transfers with Supervision.   Toilet transfer to bedside commode with Supervision. DC 10/11  Updated: Toilet t/f with Supervision. MET 10/12                            Plan:  Patient to be seen 4 x/week to address the above listed problems via self-care/home management, therapeutic activities, therapeutic exercises  Plan of Care expires: 11/02/17  Plan of Care reviewed with: patient         JULIETTE Kirby  10/13/2017

## 2017-10-13 NOTE — SUBJECTIVE & OBJECTIVE
Interval History: Nephrology re consulted for LAURA. Creatine 1.9 from 1.3 on 10.9.  Of note patient received a paracentesis of 3.3 L on 10/9 and repeat paracentesis of 1.8L on 10/12.  He was restarted on Protonix on 10/9.  Urine output over the past 24 hours is reported as x 3.   On exam family is at bedside.  Mother notes improved mental status over the hospitalization and a recent trend in darker and decreased UOP.  Patient complains of abdominal pain which he reports as near the same since being admitted.  He denies dysuria, or flank pain today.        Review of patient's allergies indicates:   Allergen Reactions    Bactrim [sulfamethoxazole-trimethoprim] Other (See Comments)     Mookie Trell Syndrome     Current Facility-Administered Medications   Medication Frequency    Amino acid 4.25% - dextrose 10% (CLINIMIX-E) solution with additives (1L provides 42.5 gm AA, 100 gm CHO (340 kcal/L dextrose), Na 35, K 30, Mg 5, Ca 4.5, Acetate 70, Cl 39, Phos 15) Continuous    ampicillin-sulbactam 3 g in sodium chloride 0.9 % 100 mL IVPB (ready to mix system) Q6H    fluconazole tablet 200 mg Daily    heparin (porcine) injection 5,000 Units Q8H    hepatitis B virus vacc.rec(PF) injection 40 mcg vaccine x 1 dose    lactulose 20 gram/30 mL solution Soln 30 g BID    ondansetron injection 4 mg Q4H PRN    pantoprazole EC tablet 40 mg BID AC    promethazine (PHENERGAN) 12.5 mg in dextrose 5 % 50 mL IVPB Q6H PRN    rifAXIMin tablet 550 mg BID    sodium bicarbonate tablet 1,300 mg TID    sucralfate 100 mg/mL suspension 1 g Q6H    tramadol tablet 50 mg Q4H PRN    vancomycin (VANCOCIN) 1,500 mg in dextrose 5 % 250 mL IVPB Q24H       Objective:     Vital Signs (Most Recent):  Temp: 100.2 °F (37.9 °C) (10/13/17 1227)  Pulse: (!) 112 (10/13/17 1500)  Resp: 16 (10/13/17 1227)  BP: (!) 128/59 (10/13/17 1227)  SpO2: 95 % (10/13/17 1227)  O2 Device (Oxygen Therapy): nasal cannula (10/13/17 1227) Vital Signs (24h Range):  Temp:   [98.7 °F (37.1 °C)-100.6 °F (38.1 °C)] 100.2 °F (37.9 °C)  Pulse:  [] 112  Resp:  [16-24] 16  SpO2:  [95 %-98 %] 95 %  BP: (121-135)/(55-76) 128/59     Weight: 90.4 kg (199 lb 4.7 oz) (10/13/17 0400)  Body mass index is 31.21 kg/m².  Body surface area is 2.07 meters squared.    I/O last 3 completed shifts:  In: 1493.3 [P.O.:620]  Out: 2305 [Urine:505; Other:1800]    Physical Exam   Constitutional: He appears well-developed. He is cooperative.  Non-toxic appearance. No distress.   HENT:   Head: Normocephalic and atraumatic.   Mouth/Throat: Oropharynx is clear and moist.   Eyes: EOM are normal. Pupils are equal, round, and reactive to light. Scleral icterus is present.   Neck: Normal range of motion. Neck supple.   Cardiovascular: Regular rhythm, normal heart sounds and normal pulses.  Tachycardia present.    No murmur heard.  Sinus tachycardia   Pulmonary/Chest: Effort normal. No respiratory distress. He has decreased breath sounds in the right lower field and the left lower field. He has no wheezes. He has no rales.   Abdominal: Soft. Normal appearance. He exhibits distension. He exhibits no mass. Bowel sounds are decreased. There is no tenderness. There is no rebound and no guarding. No hernia.   Musculoskeletal: Normal range of motion. He exhibits edema (+3 Worst on right ).   Lymphadenopathy:     He has no cervical adenopathy.   Neurological: He is alert. He is not disoriented.   Oriented to person and place today, some difficulty with time.    Skin: Skin is warm and dry.   Psychiatric: He has a normal mood and affect. His behavior is normal. His affect is not labile. His speech is delayed. Cognition and memory are not impaired.   Nursing note and vitals reviewed.      Significant Labs:  CMP:   Recent Labs  Lab 10/13/17  0543   *   CALCIUM 8.5*   ALBUMIN 2.8*   PROT 5.6*   *   K 3.8   CO2 14*   CL 99   BUN 53*   CREATININE 1.9*   ALKPHOS 124   ALT 64*   *   BILITOT 31.7*     All labs  within the past 24 hours have been reviewed.     Significant Imaging:  Labs: Reviewed  X-Ray: Reviewed 10/12 Bilateral multifocal airspace consolidation

## 2017-10-13 NOTE — PROGRESS NOTES
[facility]  Nephrology  Progress Note    Patient Name: Jhonny Diana  MRN: [id number]  Admission Date: 9/27/2017  Hospital Length of Stay: 16 days  Attending Provider: Armand Carrasco MD   Primary Care Physician: Provider Notinsystem  Principal Problem:Alcoholic hepatitis with ascites    Subjective:     HPI: Jhonny Diana is a 26 yo male with pMHX of childhood asthma and alcohol abuse who was was transferred to [facility] for hepatology evaluation for worsening liver functions.  Information was obtained from family at bedside and review of outside records, given that patient is obtunded.  According to mother at bedside, patient has had multiple admissions to the hospital over the past 6 months for recurrent abdominal pain.  She reports that he has been been seen the ED and has been diagnosed with diverticulitis, UTIs, gastroenteritis, and cholecystitis.  He was never admitted to the hospital and was always discharged.  On 09/06, patient presented to OSH for worsening abdominal pain and distension.  He was diagnosed at that time with ETOH hepatitis.  He was treated with steroids, ceftriaxone for SBP prophylaxis, and diuresed at that time for volume management.  He was discharged home on the 15th, after clearance from GI,a nd was instructed to follow-up with his PCP 1 week after discharge.  He presented to the hospital on 09/25 for worsening abdominal pain and distension with 20 kg weight gain.  He was transferred to [facility] for further care and Nephrology was consulted for LAURA.    On review of outside records, patient Scr on admission was 1.5 (with baseline at 1.0).  According to the family at the bedside, he was not urinating well at the OSH, and not responding to diuretics (spirionolactone/lasix combo).  His mother reports that patient continued to c/o abdominal and inability to urinate, but no chicas was placed until transfer to [facility] yesterday.  Record reviews shows relative hypotension with BPs in the low 90's.   His mother reports that the nursing staff was administrating his narcotic regimen based on his blood pressures, and at times, medication was held due to hypotension.  Since admission to hospital, his UOP has improved, responding well in Lasix 40 IV (770 ml since admission, 700 ml so far this shift at time of consultation).  Admit labs revealed a SCr of 2.6, this morning improved down to 2.4.  Bilirubin remains elevated, >14.  Abdomen remains with tight distension, unable to perform paracentesis, as no significant fluid pocket found.  Mother reports patient did not have para's done at OSH.    Labs on the morning of 09/27 with bicarb of 15.  ABG with alkalemia and primary respiratory alkalosis and anion gap acidosis.      Nephrology asked to re evaluate patient for worsening LAURA     Interval History: Nephrology re consulted for LAURA. Creatine 1.9 from 1.3 on 10.9.  Of note patient received a paracentesis of 3.3 L on 10/9 and repeat paracentesis of 1.8L on 10/12.  He was restarted on Protonix on 10/9.  Urine output over the past 24 hours is reported as x 3.   On exam family is at bedside.  Mother notes improved mental status over the hospitalization and a recent trend in darker and decreased UOP.  Patient complains of abdominal pain which he reports as near the same since being admitted.  He denies dysuria, or flank pain today.        Review of patient's allergies indicates:   Allergen Reactions    Bactrim [sulfamethoxazole-trimethoprim] Other (See Comments)     Mookie Trell Syndrome     Current Facility-Administered Medications   Medication Frequency    Amino acid 4.25% - dextrose 10% (CLINIMIX-E) solution with additives (1L provides 42.5 gm AA, 100 gm CHO (340 kcal/L dextrose), Na 35, K 30, Mg 5, Ca 4.5, Acetate 70, Cl 39, Phos 15) Continuous    ampicillin-sulbactam 3 g in sodium chloride 0.9 % 100 mL IVPB (ready to mix system) Q6H    fluconazole tablet 200 mg Daily    heparin (porcine) injection 5,000 Units  Q8H    hepatitis B virus vacc.rec(PF) injection 40 mcg vaccine x 1 dose    lactulose 20 gram/30 mL solution Soln 30 g BID    ondansetron injection 4 mg Q4H PRN    pantoprazole EC tablet 40 mg BID AC    promethazine (PHENERGAN) 12.5 mg in dextrose 5 % 50 mL IVPB Q6H PRN    rifAXIMin tablet 550 mg BID    sodium bicarbonate tablet 1,300 mg TID    sucralfate 100 mg/mL suspension 1 g Q6H    tramadol tablet 50 mg Q4H PRN    vancomycin (VANCOCIN) 1,500 mg in dextrose 5 % 250 mL IVPB Q24H       Objective:     Vital Signs (Most Recent):  Temp: 100.2 °F (37.9 °C) (10/13/17 1227)  Pulse: (!) 112 (10/13/17 1500)  Resp: 16 (10/13/17 1227)  BP: (!) 128/59 (10/13/17 1227)  SpO2: 95 % (10/13/17 1227)  O2 Device (Oxygen Therapy): nasal cannula (10/13/17 1227) Vital Signs (24h Range):  Temp:  [98.7 °F (37.1 °C)-100.6 °F (38.1 °C)] 100.2 °F (37.9 °C)  Pulse:  [] 112  Resp:  [16-24] 16  SpO2:  [95 %-98 %] 95 %  BP: (121-135)/(55-76) 128/59     Weight: 90.4 kg (199 lb 4.7 oz) (10/13/17 0400)  Body mass index is 31.21 kg/m².  Body surface area is 2.07 meters squared.    I/O last 3 completed shifts:  In: 1493.3 [P.O.:620]  Out: 2305 [Urine:505; Other:1800]    Physical Exam   Constitutional: He appears well-developed. He is cooperative.  Non-toxic appearance. No distress.   HENT:   Head: Normocephalic and atraumatic.   Mouth/Throat: Oropharynx is clear and moist.   Eyes: EOM are normal. Pupils are equal, round, and reactive to light. Scleral icterus is present.   Neck: Normal range of motion. Neck supple.   Cardiovascular: Regular rhythm, normal heart sounds and normal pulses.  Tachycardia present.    No murmur heard.  Sinus tachycardia   Pulmonary/Chest: Effort normal. No respiratory distress. He has decreased breath sounds in the right lower field and the left lower field. He has no wheezes. He has no rales.   Abdominal: Soft. Normal appearance. He exhibits distension. He exhibits no mass. Bowel sounds are decreased.  There is no tenderness. There is no rebound and no guarding. No hernia.   Musculoskeletal: Normal range of motion. He exhibits edema (+3 Worst on right ).   Lymphadenopathy:     He has no cervical adenopathy.   Neurological: He is alert. He is not disoriented.   Oriented to person and place today, some difficulty with time.    Skin: Skin is warm and dry.   Psychiatric: He has a normal mood and affect. His behavior is normal. His affect is not labile. His speech is delayed. Cognition and memory are not impaired.   Nursing note and vitals reviewed.      Significant Labs:  CMP:   Recent Labs  Lab 10/13/17  0543   *   CALCIUM 8.5*   ALBUMIN 2.8*   PROT 5.6*   *   K 3.8   CO2 14*   CL 99   BUN 53*   CREATININE 1.9*   ALKPHOS 124   ALT 64*   *   BILITOT 31.7*     All labs within the past 24 hours have been reviewed.     Significant Imaging:  Labs: Reviewed  X-Ray: Reviewed 10/12 Bilateral multifocal airspace consolidation    Assessment/Plan:     Hyponatremia    - Likely Hypervolemic Hyponatremia in setting of liver failure   - Likely 2/2 ADH secretion from decreased renal perfusion due to tense ascites.  - Agree with water restriction, Will diuresis with lasix which should improve sodium  - Monitor BMP and for signs of AMS            Case discussed with fellow, Staff attestation to follow.   Thank you for your consult. I will follow-up with patient. Please contact us if you have any additional questions.    Anoop Nina MD  Nephrology  [facility]  I have reviewed and concur with the resident's history, physical, assessment, and plan. I have personally interviewed and examined the patient at bedside

## 2017-10-13 NOTE — PROGRESS NOTES
"[facility]  Hepatology  Progress Note    Patient Name: Jhonny Diana  MRN: [id number]  Admission Date: 9/27/2017  Hospital Length of Stay: 16 days  Attending Provider: Armand Carrasco MD   Primary Care Physician: Provider Notinsystem  Principal Problem:Alcoholic hepatitis with ascites    Subjective:     Transplant status: No    HPI: 28 yo male with hx of childhood asthma and heavy alcohol abuse (fifth of liquor daily since teenager, last drink 2.5 wks ago per mother) who was transferred to [facility] for higher level of care/liver transplant evaluation.    History obtained per chart review and discussion with mother: Patient initially presented to OSH for a 2 mo hx of progressively worsening abdominal pain with associated fatigue, jaundice, and abdominal distention with ~20# weight gain.     The patient's mother states he began having abdominal pain about 6 months ago.  States he underwent many tests, all of which were inconclusive.  He then began to have abdominal swelling about two months ago and was in&out of the local ER.  He was still actively drinking up until about 2.5 weeks ago according to the mother but she is unsure of this.  At the OSH the patient underwent an EGD which showed g1EV.  He was also diagnosed with "pancreatitis" based on an elevated lipase.  The patient was also placed on prednisone given concerns for alcoholic hepatitis.           Interval History:   Patient oriented this morning but complaining of abdominal cramps/pain and also spiked a fever this morning (100.6).    Current Facility-Administered Medications   Medication    Amino acid 4.25% - dextrose 10% (CLINIMIX-E) solution with additives (1L provides 42.5 gm AA, 100 gm CHO (340 kcal/L dextrose), Na 35, K 30, Mg 5, Ca 4.5, Acetate 70, Cl 39, Phos 15)    ampicillin-sulbactam 3 g in sodium chloride 0.9 % 100 mL IVPB (ready to mix system)    fluconazole tablet 200 mg    heparin (porcine) injection 5,000 Units    hepatitis B " virus vacc.rec(PF) injection 40 mcg    lactulose 20 gram/30 mL solution Soln 30 g    ondansetron injection 4 mg    pantoprazole EC tablet 40 mg    promethazine (PHENERGAN) 12.5 mg in dextrose 5 % 50 mL IVPB    rifAXIMin tablet 550 mg    sodium bicarbonate tablet 1,300 mg    sucralfate 100 mg/mL suspension 1 g    tramadol tablet 50 mg    vancomycin (VANCOCIN) 1,500 mg in dextrose 5 % 250 mL IVPB       Objective:     Vital Signs (Most Recent):  Temp: 100.2 °F (37.9 °C) (10/13/17 1227)  Pulse: (!) 114 (10/13/17 1227)  Resp: 16 (10/13/17 1227)  BP: (!) 128/59 (10/13/17 1227)  SpO2: 95 % (10/13/17 1227) Vital Signs (24h Range):  Temp:  [98.7 °F (37.1 °C)-100.6 °F (38.1 °C)] 100.2 °F (37.9 °C)  Pulse:  [] 114  Resp:  [16-24] 16  SpO2:  [95 %-98 %] 95 %  BP: (121-135)/(55-76) 128/59     Weight: 90.4 kg (199 lb 4.7 oz) (10/13/17 0400)  Body mass index is 31.21 kg/m².    Physical Exam   Constitutional: He is oriented to person, place, and time. No distress.   HENT:   Head: Normocephalic.   Eyes: Pupils are equal, round, and reactive to light. Scleral icterus is present.   Neck: Normal range of motion.   Cardiovascular: Normal rate and regular rhythm.    Pulmonary/Chest: Effort normal and breath sounds normal.   Abdominal: Bowel sounds are normal. He exhibits distension. He exhibits no mass. There is tenderness. There is no rebound and no guarding. No hernia.   Musculoskeletal: Normal range of motion. He exhibits edema.   Neurological: He is alert and oriented to person, place, and time.   Skin: He is not diaphoretic.       MELD-Na score: 34 at 10/13/2017  5:43 AM  MELD score: 30 at 10/13/2017  5:43 AM  Calculated from:  Serum Creatinine: 1.9 mg/dL at 10/13/2017  5:43 AM  Serum Sodium: 126 mmol/L at 10/13/2017  5:43 AM  Total Bilirubin: 31.7 mg/dL at 10/13/2017  5:43 AM  INR(ratio): 1.5 at 10/13/2017  5:43 AM  Age: 27 years    Significant Labs:  CBC:   Recent Labs  Lab 10/13/17  0543   WBC 15.92*   RBC 1.94*    HGB 7.4*   HCT 20.6*        CMP:   Recent Labs  Lab 10/13/17  0543   *   CALCIUM 8.5*   ALBUMIN 2.8*   PROT 5.6*   *   K 3.8   CO2 14*   CL 99   BUN 53*   CREATININE 1.9*   ALKPHOS 124   ALT 64*   *   BILITOT 31.7*     Coagulation:   Recent Labs  Lab 10/13/17  0543   INR 1.5*       Significant Imaging:  CXR reviewed    Assessment/Plan:     Decompensated hepatic cirrhosis with ascites and HE    27 year old male with a history of alcohol abuse transferred from [facility] for higher level of care. On admission patient came in with severe AH.     Patient has significantly improved since admission yet this morning complained of significant abdominal pain thought to be secondary to lactulose.  For this reason lactulose was decreased furthermore Carafate was added as he described mostly epigastric discomfort. Octreotide was D/C to make sure there wasn't any ischemia contributing to abdominal pain but this is highly unlikely. Lastly a KUB was placed to rule out an acute abdomen.    In regards to his fever, we are in agreement with primary team regarding cultures an empiric antibiotics.    Recommendations:  - Continue high protein diet  - Obtain cultures  - Continue Lactulose (dose decreased) and Rifaximin  - D/C Octreotide and start Carafate  - KUB to rule out ileus or obstruction  - Restart empiric antibiotics  - Continue daily PT/OT              Thank you for your consult. I will follow-up with patient. Please contact us if you have any additional questions.    Divya Booker M.D.  Gastroenterology Fellow, PGY-IV  Pager: [phone]  [facility]

## 2017-10-13 NOTE — PLAN OF CARE
Problem: Patient Care Overview  Goal: Plan of Care Review  Outcome: Ongoing (interventions implemented as appropriate)  Pt free from falls. Pt wears non slip socks when ambulating. Pt bed low and locked position. Pt max temp 100.3 Pt IV site without redness or edema. Educated pt on importance of hand washing. Pt has denied any pain. Pt continues with TPN. Pt did eat a chik ronny sandwich and fries this shift.

## 2017-10-13 NOTE — ASSESSMENT & PLAN NOTE
27 year old male with a history of alcohol abuse transferred from OSH for higher level of care. On admission patient came in with severe AH.     Patient has significantly improved since admission yet this morning complained of significant abdominal pain thought to be secondary to lactulose.  For this reason lactulose was decreased furthermore Carafate was added as he described mostly epigastric discomfort. Octreotide was D/C to make sure there wasn't any ischemia contributing to abdominal pain but this is highly unlikely. Lastly a KUB was placed to rule out an acute abdomen.    In regards to his fever, we are in agreement with primary team regarding cultures an empiric antibiotics.    Recommendations:  - Continue high protein diet  - Obtain cultures  - Continue Lactulose (dose decreased) and Rifaximin  - D/C Octreotide and start Carafate  - KUB to rule out ileus or obstruction  - Restart empiric antibiotics  - Continue daily PT/OT

## 2017-10-13 NOTE — ASSESSMENT & PLAN NOTE
Patient evaluated today found in no acute distress. Patient with positive ESBL in sputum with bilateral consolidates in CXR. On evaluation patient resting comfortable with no respiratory distress. Has not had interval increase in O2 requirement. No episodes of desaturation or BP instability. WBC with down trending pattern now at 15 previous at 30. Comparing past CXR patient with improvement from findings since treatment for pneumonia. At this time ESBL in sputum believed to be colonization of the airway and not an acute infectious process. Patient recently received immunization shots,  low grade fever can be associated. Would observe at this time and hold from treatment. Will monitor progress of patient and determine if deteriorate or develops respiratory symptoms to treat at that time.     - No treatment with antibiotics at this time  - will monitor progress

## 2017-10-13 NOTE — ASSESSMENT & PLAN NOTE
· Appreciate Hepatology assistance  · Continue Lactulose and Rifaximin for HE  · Currently not transplant candidate per Psych and Hep.  Addiction Psych consulted  · IR para 10/9- 7.8 L fluid removed negative for SBP  · Stressed the importance of high calorie diet to prevent NGT insertion again - Boost TID  · Patient agreed to due alcohol rehab if patient was to get a liver transplant; we have initiated inpatient liver transplant evaluation;  presentation on friday    MELD-Na score: 34 at 10/12/2017  5:58 AM  MELD score: 30 at 10/12/2017  5:58 AM  Calculated from:  Serum Creatinine: 1.8 mg/dL at 10/12/2017  5:58 AM  Serum Sodium: 126 mmol/L at 10/12/2017  5:58 AM  Total Bilirubin: 31.0 mg/dL at 10/12/2017  5:58 AM  INR(ratio): 1.5 at 10/12/2017  5:58 AM  Age: 27 years

## 2017-10-13 NOTE — CONSULTS
PharmD Consult received for: Renally adjust all medications, Vancomycin/Aminoglycoside consult:        26yo M with alcoholic hepatitis now having fevers of unknown cause, started on unasyn and vanc. PharmD consulted for renal dose adjustments and vancomycin dosing.    A/P:    1. Amp-sulbactam changed from q12h to q6h per renal function, CrCl 62 ml/min. See previous renal dose adjustment note.  2. Vancomycin 1500mg (~15mg/kg) IV q24h ordered. Renal function increasing, up to 1.9 today (baselie appears to be around 0.9-1.1). Patient had vancomycin a couple weeks ago with similar kidney function and had therapeutic levels on q24h dosing so will keep patient on this. Lvl prior to 4th dose or sooner if kidney function becomes much worse.       Thank you for the consult,    Arabella Avila, PharmD  [id number]    **Note: Consults are reviewed Monday-Friday 7:30-4pm. The above recommendations are only suggested. The recommendations should be considered in conjunction with all patient factors.**

## 2017-10-13 NOTE — PROGRESS NOTES
Pharmacist Renal Dose Adjustment Note    Jhonny Diana is a 27 y.o. male being treated with the medication ampicillin-sulbactam    Patient Data:    Vital Signs (Most Recent):  Temp: (!) 100.6 °F (38.1 °C) (10/13/17 0800)  Pulse: (!) 115 (10/13/17 0800)  Resp: 20 (10/13/17 0800)  BP: (!) 135/55 (10/13/17 0800)  SpO2: 96 % (10/13/17 0800)   Vital Signs (72h Range):  Temp:  [98.2 °F (36.8 °C)-100.6 °F (38.1 °C)]   Pulse:  []   Resp:  [16-24]   BP: (115-136)/(55-76)   SpO2:  [95 %-99 %]        Recent Labs     Lab 10/11/17  0440 10/12/17  0558 10/13/17  0543   CREATININE 1.5* 1.8* 1.9*     Serum creatinine: 1.9 mg/dL High 10/13/17 0543  Estimated creatinine clearance: 62.1 mL/min    Ampicillin-sulbactam 3g IV q12h will be changed to 3g IV q6h for CrCl>50 ml/min    Arabella Avila, ErenD  [id number]

## 2017-10-13 NOTE — CONSULTS
Consult received. Full note to follow.    Please call for questions.    Thanks,  Bernarda Borrego MD  Infectious Disease Fellow, PGY-4  Pager: [phone]  [facility]

## 2017-10-13 NOTE — ASSESSMENT & PLAN NOTE
- Likely Hypervolemic Hyponatremia in setting of liver failure   - Likely 2/2 ADH secretion from decreased renal perfusion due to tense ascites.  - Agree with water restriction, Will diuresis which should improve sodium  - Closely monitor NA and for signs of AMS.

## 2017-10-13 NOTE — SUBJECTIVE & OBJECTIVE
Interval History:   Patient oriented this morning but complaining of abdominal cramps/pain and also spiked a fever this morning (100.6).    Current Facility-Administered Medications   Medication    Amino acid 4.25% - dextrose 10% (CLINIMIX-E) solution with additives (1L provides 42.5 gm AA, 100 gm CHO (340 kcal/L dextrose), Na 35, K 30, Mg 5, Ca 4.5, Acetate 70, Cl 39, Phos 15)    ampicillin-sulbactam 3 g in sodium chloride 0.9 % 100 mL IVPB (ready to mix system)    fluconazole tablet 200 mg    heparin (porcine) injection 5,000 Units    hepatitis B virus vacc.rec(PF) injection 40 mcg    lactulose 20 gram/30 mL solution Soln 30 g    ondansetron injection 4 mg    pantoprazole EC tablet 40 mg    promethazine (PHENERGAN) 12.5 mg in dextrose 5 % 50 mL IVPB    rifAXIMin tablet 550 mg    sodium bicarbonate tablet 1,300 mg    sucralfate 100 mg/mL suspension 1 g    tramadol tablet 50 mg    vancomycin (VANCOCIN) 1,500 mg in dextrose 5 % 250 mL IVPB       Objective:     Vital Signs (Most Recent):  Temp: 100.2 °F (37.9 °C) (10/13/17 1227)  Pulse: (!) 114 (10/13/17 1227)  Resp: 16 (10/13/17 1227)  BP: (!) 128/59 (10/13/17 1227)  SpO2: 95 % (10/13/17 1227) Vital Signs (24h Range):  Temp:  [98.7 °F (37.1 °C)-100.6 °F (38.1 °C)] 100.2 °F (37.9 °C)  Pulse:  [] 114  Resp:  [16-24] 16  SpO2:  [95 %-98 %] 95 %  BP: (121-135)/(55-76) 128/59     Weight: 90.4 kg (199 lb 4.7 oz) (10/13/17 0400)  Body mass index is 31.21 kg/m².    Physical Exam   Constitutional: He is oriented to person, place, and time. No distress.   HENT:   Head: Normocephalic.   Eyes: Pupils are equal, round, and reactive to light. Scleral icterus is present.   Neck: Normal range of motion.   Cardiovascular: Normal rate and regular rhythm.    Pulmonary/Chest: Effort normal and breath sounds normal.   Abdominal: Bowel sounds are normal. He exhibits distension. He exhibits no mass. There is tenderness. There is no rebound and no guarding. No hernia.    Musculoskeletal: Normal range of motion. He exhibits edema.   Neurological: He is alert and oriented to person, place, and time.   Skin: He is not diaphoretic.       MELD-Na score: 34 at 10/13/2017  5:43 AM  MELD score: 30 at 10/13/2017  5:43 AM  Calculated from:  Serum Creatinine: 1.9 mg/dL at 10/13/2017  5:43 AM  Serum Sodium: 126 mmol/L at 10/13/2017  5:43 AM  Total Bilirubin: 31.7 mg/dL at 10/13/2017  5:43 AM  INR(ratio): 1.5 at 10/13/2017  5:43 AM  Age: 27 years    Significant Labs:  CBC:   Recent Labs  Lab 10/13/17  0543   WBC 15.92*   RBC 1.94*   HGB 7.4*   HCT 20.6*        CMP:   Recent Labs  Lab 10/13/17  0543   *   CALCIUM 8.5*   ALBUMIN 2.8*   PROT 5.6*   *   K 3.8   CO2 14*   CL 99   BUN 53*   CREATININE 1.9*   ALKPHOS 124   ALT 64*   *   BILITOT 31.7*     Coagulation:   Recent Labs  Lab 10/13/17  0543   INR 1.5*       Significant Imaging:  CXR reviewed

## 2017-10-13 NOTE — ASSESSMENT & PLAN NOTE
- Given history feel LAURA most likely multifactorial , Hyperbilirubinemia, prerenal/ATN secondary to large volume shifts during paracentesis (no albumin on days of para), aggravated by anemia (Hgb noted to be 7.3 from 11 on admission)  Less likely would be an AIN 2/2 to restarting of Protonix on 10/09   - Feel HRS is unlikely at this point with urine sediment and normal pressures    - Urine sediment show small muddy cast  - Obtain Urine lytes (urea, sodium and creatine), and wrights stain  - Will pursue supportive care for now  - Start albumin 25g Q8, and Lasix 80 IV BID  - Strict Ins and Outs, Avoid nephrotic agents.

## 2017-10-13 NOTE — ASSESSMENT & PLAN NOTE
· Appreciate Nephro's help  · Suspect LAURA multifactorial: ischemic ATN given hypotension and injury due to hyperbilirubinemia. Felt less likely to be HRS however have continued albumin with lasix  · Overall, LAURA improving and making adequate urine.  Not measuring amounts as he pulls off condom catheter and is in diapers - but mother reports frequent wet diapers    10/12- renal function worsening, will re-consult nephrology; adding octreotide; strict I/O; decrease UOP

## 2017-10-13 NOTE — SUBJECTIVE & OBJECTIVE
Past Medical History:   Diagnosis Date    Hypertension        Past Surgical History:   Procedure Laterality Date    APPENDECTOMY         Review of patient's allergies indicates:   Allergen Reactions    Bactrim [sulfamethoxazole-trimethoprim] Other (See Comments)     Mookie Trell Syndrome       Medications:  No prescriptions prior to admission.     Antibiotics     Start     Stop Route Frequency Ordered    10/13/17 1030  ampicillin-sulbactam 3 g in sodium chloride 0.9 % 100 mL IVPB (ready to mix system)      -- IV Every 6 hours (non-standard times) 10/13/17 1030    10/13/17 1000  vancomycin (VANCOCIN) 1,500 mg in dextrose 5 % 250 mL IVPB  (Vancomycin IVPB with levels panel)      -- IV Every 24 hours (non-standard times) 10/13/17 0848    09/30/17 2100  rifAXIMin tablet 550 mg      -- Oral 2 times daily 09/30/17 1452        Antifungals     Start     Stop Route Frequency Ordered    10/13/17 1000  fluconazole tablet 200 mg      -- Oral Daily 10/13/17 0848        Antivirals     None           Immunization History   Administered Date(s) Administered    Hepatitis A, Pediatric/Adolescent, 2 Dose 10/11/2017    Hepatitis B, Adult 10/12/2017    Influenza - High Dose 10/12/2017    Pneumococcal Conjugate - 13 Valent 10/12/2017    Tdap 10/11/2017       Family History     None        Social History     Social History    Marital status: Single     Spouse name: N/A    Number of children: N/A    Years of education: N/A     Social History Main Topics    Smoking status: Former Smoker     Types: Cigarettes    Smokeless tobacco: Never Used    Alcohol use Yes      Comment: a fifth of liquor daily for years, cut back over last 2 months    Drug use: No    Sexual activity: Not Asked     Other Topics Concern    None     Social History Narrative    None     Review of Systems   Constitutional: Negative for activity change, chills, diaphoresis, fatigue and fever.   HENT: Negative for congestion.    Respiratory: Negative for  cough, choking, chest tightness and shortness of breath.    Gastrointestinal: Positive for abdominal distention and abdominal pain. Negative for diarrhea, nausea and vomiting.     Objective:     Vital Signs (Most Recent):  Temp: 100.2 °F (37.9 °C) (10/13/17 1227)  Pulse: (!) 112 (10/13/17 1500)  Resp: 16 (10/13/17 1227)  BP: (!) 128/59 (10/13/17 1227)  SpO2: 95 % (10/13/17 1227) Vital Signs (24h Range):  Temp:  [98.7 °F (37.1 °C)-100.6 °F (38.1 °C)] 100.2 °F (37.9 °C)  Pulse:  [] 112  Resp:  [16-24] 16  SpO2:  [95 %-98 %] 95 %  BP: (121-135)/(55-76) 128/59     Weight: 90.4 kg (199 lb 4.7 oz)  Body mass index is 31.21 kg/m².    Estimated Creatinine Clearance: 62.1 mL/min (based on SCr of 1.9 mg/dL (H)).    Physical Exam   Constitutional: He is oriented to person, place, and time.   HENT:   Head: Normocephalic and atraumatic.   Eyes: EOM are normal. Pupils are equal, round, and reactive to light. Scleral icterus is present.   Neck: Normal range of motion.   Cardiovascular: Regular rhythm and normal heart sounds.    No murmur heard.  Pulmonary/Chest: Effort normal and breath sounds normal.   Abdominal: Soft. Bowel sounds are normal. He exhibits distension. There is no tenderness.   Musculoskeletal: He exhibits edema. He exhibits no deformity.   Neurological: He is alert and oriented to person, place, and time.   Skin: No rash noted.       Significant Labs:   Bilirubin:   Recent Labs  Lab 10/06/17  0434 10/07/17  0403 10/08/17  0659 10/09/17  0436 10/10/17  0450 10/11/17  0440 10/12/17  0558 10/13/17  0543   BILIDIR >14.0* >14.0* >14.0* >14.0* >14.0*  --   --   --    BILITOT 29.8* 30.6* 32.9* 30.6* 29.8* 29.4* 31.0* 31.7*     Blood Culture:   Recent Labs  Lab 09/28/17  0030 09/28/17  0044 10/03/17  1015 10/03/17  1035   LABBLOO No growth after 5 days. No growth after 5 days. No growth after 5 days. No growth after 5 days.     BMP:   Recent Labs  Lab 10/13/17  0543   *   *   K 3.8   CL 99   CO2 14*    BUN 53*   CREATININE 1.9*   CALCIUM 8.5*   MG 2.2     CBC:   Recent Labs  Lab 10/12/17  0558 10/13/17  0543   WBC 19.42* 15.92*   HGB 7.6* 7.4*   HCT 21.5* 20.6*    165     Microbiology Results (last 7 days)     Procedure Component Value Units Date/Time    Blood culture [[id number]] Collected:  10/13/17 0934    Order Status:  Sent Specimen:  Blood Updated:  10/13/17 1113    Urine culture [[id number]] Collected:  10/13/17 0927    Order Status:  Sent Specimen:  Urine from Urine, Clean Catch Updated:  10/13/17 1020    Culture, Body Fluid - Bactec [[id number]] Collected:  10/05/17 1436    Order Status:  Completed Specimen:  Body Fluid from Ascites Updated:  10/10/17 1812     Body Fluid Culture, Sterile No growth after 5 days.    Culture, Respiratory with Gram Stain [[id number]]  (Susceptibility) Collected:  10/06/17 1921    Order Status:  Completed Specimen:  Respiratory from Sputum Updated:  10/09/17 1032     Respiratory Culture --     KLEBSIELLA PNEUMONIAE ESBL  Many       Gram Stain (Respiratory) <10 epithelial cells per low power field     Gram Stain (Respiratory) No WBC's     Gram Stain (Respiratory) Few Gram negative rods    Blood culture [[id number]] Collected:  10/03/17 1015    Order Status:  Completed Specimen:  Blood from Peripheral, Hand, Left Updated:  10/08/17 2012     Blood Culture, Routine No growth after 5 days.    Narrative:       Blood cultures x 2 different sites. 4 bottles total. Please  draw cultures before administering antibiotics.    Blood culture [[id number]] Collected:  10/03/17 1035    Order Status:  Completed Specimen:  Blood from Peripheral, Hand, Right Updated:  10/08/17 1412     Blood Culture, Routine No growth after 5 days.    Narrative:       Blood cultures from 2 different sites. 4 bottles total.  Please draw before starting antibiotics.          Significant Imaging: I have reviewed all pertinent imaging results/findings within the past 24 hours.

## 2017-10-14 LAB
ALBUMIN SERPL BCP-MCNC: 2.5 G/DL
ALP SERPL-CCNC: 118 U/L
ALT SERPL W/O P-5'-P-CCNC: 60 U/L
ANION GAP SERPL CALC-SCNC: 11 MMOL/L
ANION GAP SERPL CALC-SCNC: 12 MMOL/L
AST SERPL-CCNC: 118 U/L
BACTERIA UR CULT: NORMAL
BACTERIA UR CULT: NORMAL
BASOPHILS # BLD AUTO: 0.04 K/UL
BASOPHILS NFR BLD: 0.3 %
BILIRUB SERPL-MCNC: 31.3 MG/DL
BLD PROD TYP BPU: NORMAL
BLOOD UNIT EXPIRATION DATE: NORMAL
BLOOD UNIT TYPE CODE: 5100
BLOOD UNIT TYPE: NORMAL
BUN SERPL-MCNC: 66 MG/DL
BUN SERPL-MCNC: 74 MG/DL
CALCIUM SERPL-MCNC: 8.3 MG/DL
CALCIUM SERPL-MCNC: 8.6 MG/DL
CHLORIDE SERPL-SCNC: 95 MMOL/L
CHLORIDE SERPL-SCNC: 97 MMOL/L
CO2 SERPL-SCNC: 15 MMOL/L
CO2 SERPL-SCNC: 16 MMOL/L
CODING SYSTEM: NORMAL
CREAT SERPL-MCNC: 2 MG/DL
CREAT SERPL-MCNC: 2.3 MG/DL
DIFFERENTIAL METHOD: ABNORMAL
DISPENSE STATUS: NORMAL
EOSINOPHIL # BLD AUTO: 0.1 K/UL
EOSINOPHIL NFR BLD: 0.5 %
ERYTHROCYTE [DISTWIDTH] IN BLOOD BY AUTOMATED COUNT: 15.1 %
EST. GFR  (AFRICAN AMERICAN): 43.1 ML/MIN/1.73 M^2
EST. GFR  (AFRICAN AMERICAN): 51 ML/MIN/1.73 M^2
EST. GFR  (NON AFRICAN AMERICAN): 37.3 ML/MIN/1.73 M^2
EST. GFR  (NON AFRICAN AMERICAN): 44.1 ML/MIN/1.73 M^2
GLUCOSE SERPL-MCNC: 130 MG/DL
GLUCOSE SERPL-MCNC: 181 MG/DL
HCT VFR BLD AUTO: 19.9 %
HGB BLD-MCNC: 7.1 G/DL
INR PPP: 1.6
LYMPHOCYTES # BLD AUTO: 1.3 K/UL
LYMPHOCYTES NFR BLD: 8.2 %
MAGNESIUM SERPL-MCNC: 2.1 MG/DL
MCH RBC QN AUTO: 38.6 PG
MCHC RBC AUTO-ENTMCNC: 35.7 G/DL
MCV RBC AUTO: 108 FL
MONOCYTES # BLD AUTO: 1.5 K/UL
MONOCYTES NFR BLD: 9.9 %
NEUTROPHILS # BLD AUTO: 12.6 K/UL
NEUTROPHILS NFR BLD: 81.1 %
PHOSPHATE SERPL-MCNC: 3.3 MG/DL
PLATELET # BLD AUTO: 167 K/UL
PMV BLD AUTO: 11 FL
POCT GLUCOSE: 141 MG/DL (ref 70–110)
POTASSIUM SERPL-SCNC: 3.9 MMOL/L
POTASSIUM SERPL-SCNC: 4 MMOL/L
PROT SERPL-MCNC: 5.5 G/DL
PROTHROMBIN TIME: 16.2 SEC
RBC # BLD AUTO: 1.84 M/UL
SODIUM SERPL-SCNC: 123 MMOL/L
SODIUM SERPL-SCNC: 123 MMOL/L
TRANS ERYTHROCYTES VOL PATIENT: NORMAL ML
WBC # BLD AUTO: 15.59 K/UL

## 2017-10-14 PROCEDURE — 97110 THERAPEUTIC EXERCISES: CPT | Mod: NTX

## 2017-10-14 PROCEDURE — P9021 RED BLOOD CELLS UNIT: HCPCS | Mod: NTX

## 2017-10-14 PROCEDURE — 83735 ASSAY OF MAGNESIUM: CPT | Mod: NTX

## 2017-10-14 PROCEDURE — 80048 BASIC METABOLIC PNL TOTAL CA: CPT | Mod: NTX

## 2017-10-14 PROCEDURE — 84100 ASSAY OF PHOSPHORUS: CPT | Mod: NTX

## 2017-10-14 PROCEDURE — 99231 SBSQ HOSP IP/OBS SF/LOW 25: CPT | Mod: NTX,,, | Performed by: INTERNAL MEDICINE

## 2017-10-14 PROCEDURE — 25000003 PHARM REV CODE 250: Mod: NTX | Performed by: STUDENT IN AN ORGANIZED HEALTH CARE EDUCATION/TRAINING PROGRAM

## 2017-10-14 PROCEDURE — 25000003 PHARM REV CODE 250: Mod: NTX | Performed by: INTERNAL MEDICINE

## 2017-10-14 PROCEDURE — 25000003 PHARM REV CODE 250: Mod: NTX | Performed by: HOSPITALIST

## 2017-10-14 PROCEDURE — 99233 SBSQ HOSP IP/OBS HIGH 50: CPT | Mod: NTX,,, | Performed by: INTERNAL MEDICINE

## 2017-10-14 PROCEDURE — 99233 SBSQ HOSP IP/OBS HIGH 50: CPT | Mod: NTX,,, | Performed by: HOSPITALIST

## 2017-10-14 PROCEDURE — 20600001 HC STEP DOWN PRIVATE ROOM: Mod: NTX

## 2017-10-14 PROCEDURE — P9047 ALBUMIN (HUMAN), 25%, 50ML: HCPCS | Mod: NTX | Performed by: HOSPITALIST

## 2017-10-14 PROCEDURE — 85025 COMPLETE CBC W/AUTO DIFF WBC: CPT | Mod: NTX

## 2017-10-14 PROCEDURE — 80053 COMPREHEN METABOLIC PANEL: CPT | Mod: NTX

## 2017-10-14 PROCEDURE — 99232 SBSQ HOSP IP/OBS MODERATE 35: CPT | Mod: NTX,,, | Performed by: INTERNAL MEDICINE

## 2017-10-14 PROCEDURE — 36430 TRANSFUSION BLD/BLD COMPNT: CPT | Mod: NTX

## 2017-10-14 PROCEDURE — 36415 COLL VENOUS BLD VENIPUNCTURE: CPT | Mod: NTX

## 2017-10-14 PROCEDURE — 63600175 PHARM REV CODE 636 W HCPCS: Mod: NTX | Performed by: NURSE PRACTITIONER

## 2017-10-14 PROCEDURE — 97116 GAIT TRAINING THERAPY: CPT | Mod: NTX

## 2017-10-14 PROCEDURE — 85610 PROTHROMBIN TIME: CPT | Mod: NTX

## 2017-10-14 PROCEDURE — 63600175 PHARM REV CODE 636 W HCPCS: Mod: NTX | Performed by: HOSPITALIST

## 2017-10-14 RX ORDER — ALBUMIN HUMAN 250 G/1000ML
25 SOLUTION INTRAVENOUS 3 TIMES DAILY
Status: DISCONTINUED | OUTPATIENT
Start: 2017-10-14 | End: 2017-10-16

## 2017-10-14 RX ORDER — HYDROCODONE BITARTRATE AND ACETAMINOPHEN 500; 5 MG/1; MG/1
TABLET ORAL
Status: DISCONTINUED | OUTPATIENT
Start: 2017-10-14 | End: 2017-10-17

## 2017-10-14 RX ORDER — LACTULOSE 10 G/15ML
20 SOLUTION ORAL 3 TIMES DAILY
Status: DISCONTINUED | OUTPATIENT
Start: 2017-10-14 | End: 2017-10-15

## 2017-10-14 RX ORDER — FUROSEMIDE 10 MG/ML
80 INJECTION INTRAMUSCULAR; INTRAVENOUS 2 TIMES DAILY
Status: DISCONTINUED | OUTPATIENT
Start: 2017-10-14 | End: 2017-10-15

## 2017-10-14 RX ORDER — OCTREOTIDE ACETATE 100 UG/ML
100 INJECTION, SOLUTION INTRAVENOUS; SUBCUTANEOUS EVERY 8 HOURS
Status: DISCONTINUED | OUTPATIENT
Start: 2017-10-14 | End: 2017-10-20

## 2017-10-14 RX ADMIN — FUROSEMIDE 80 MG: 10 INJECTION, SOLUTION INTRAVENOUS at 09:10

## 2017-10-14 RX ADMIN — FLUCONAZOLE 200 MG: 200 TABLET ORAL at 09:10

## 2017-10-14 RX ADMIN — ALBUMIN (HUMAN) 25 G: 12.5 SOLUTION INTRAVENOUS at 02:10

## 2017-10-14 RX ADMIN — HEPARIN SODIUM 5000 UNITS: 5000 INJECTION, SOLUTION INTRAVENOUS; SUBCUTANEOUS at 05:10

## 2017-10-14 RX ADMIN — OCTREOTIDE ACETATE 100 MCG: 100 INJECTION, SOLUTION INTRAVENOUS; SUBCUTANEOUS at 10:10

## 2017-10-14 RX ADMIN — SODIUM BICARBONATE 650 MG TABLET 1300 MG: at 05:10

## 2017-10-14 RX ADMIN — AMPICILLIN SODIUM AND SULBACTAM SODIUM 3 G: 2; 1 INJECTION, POWDER, FOR SOLUTION INTRAMUSCULAR; INTRAVENOUS at 05:10

## 2017-10-14 RX ADMIN — SUCRALFATE 1 G: 1 SUSPENSION ORAL at 06:10

## 2017-10-14 RX ADMIN — SUCRALFATE 1 G: 1 SUSPENSION ORAL at 11:10

## 2017-10-14 RX ADMIN — FUROSEMIDE 80 MG: 10 INJECTION, SOLUTION INTRAVENOUS at 06:10

## 2017-10-14 RX ADMIN — ASCORBIC ACID, VITAMIN A PALMITATE, CHOLECALCIFEROL, THIAMINE HYDROCHLORIDE, RIBOFLAVIN-5 PHOSPHATE SODIUM, PYRIDOXINE HYDROCHLORIDE, NIACINAMIDE, DEXPANTHENOL, ALPHA-TOCOPHEROL ACETATE, VITAMIN K1, FOLIC ACID, BIOTIN, CYANOCOBALAMIN: 200; 3300; 200; 6; 3.6; 6; 40; 15; 10; 150; 600; 60; 5 INJECTION, SOLUTION INTRAVENOUS at 08:10

## 2017-10-14 RX ADMIN — VANCOMYCIN HYDROCHLORIDE 1500 MG: 10 INJECTION, POWDER, LYOPHILIZED, FOR SOLUTION INTRAVENOUS at 09:10

## 2017-10-14 RX ADMIN — LACTULOSE 30 G: 20 SOLUTION ORAL at 09:10

## 2017-10-14 RX ADMIN — TRAMADOL HYDROCHLORIDE 50 MG: 50 TABLET, COATED ORAL at 05:10

## 2017-10-14 RX ADMIN — SUCRALFATE 1 G: 1 SUSPENSION ORAL at 12:10

## 2017-10-14 RX ADMIN — RIFAXIMIN 550 MG: 550 TABLET ORAL at 09:10

## 2017-10-14 RX ADMIN — SUCRALFATE 1 G: 1 SUSPENSION ORAL at 05:10

## 2017-10-14 RX ADMIN — PANTOPRAZOLE SODIUM 40 MG: 40 TABLET, DELAYED RELEASE ORAL at 04:10

## 2017-10-14 RX ADMIN — OCTREOTIDE ACETATE 100 MCG: 100 INJECTION, SOLUTION INTRAVENOUS; SUBCUTANEOUS at 08:10

## 2017-10-14 RX ADMIN — PANTOPRAZOLE SODIUM 40 MG: 40 TABLET, DELAYED RELEASE ORAL at 05:10

## 2017-10-14 RX ADMIN — AMPICILLIN SODIUM AND SULBACTAM SODIUM 3 G: 2; 1 INJECTION, POWDER, FOR SOLUTION INTRAMUSCULAR; INTRAVENOUS at 12:10

## 2017-10-14 RX ADMIN — ALBUMIN (HUMAN) 25 G: 12.5 SOLUTION INTRAVENOUS at 08:10

## 2017-10-14 RX ADMIN — SODIUM BICARBONATE 650 MG TABLET 1300 MG: at 08:10

## 2017-10-14 RX ADMIN — SODIUM BICARBONATE 650 MG TABLET 1300 MG: at 02:10

## 2017-10-14 RX ADMIN — ONDANSETRON 4 MG: 2 INJECTION INTRAMUSCULAR; INTRAVENOUS at 04:10

## 2017-10-14 RX ADMIN — ALBUMIN (HUMAN) 25 G: 12.5 SOLUTION INTRAVENOUS at 09:10

## 2017-10-14 RX ADMIN — RIFAXIMIN 550 MG: 550 TABLET ORAL at 08:10

## 2017-10-14 NOTE — SUBJECTIVE & OBJECTIVE
Interval History: Patient continue afebrile, on events over night. Patient has no complaints     Review of Systems   Constitutional: Negative for chills, diaphoresis, fatigue and fever.   Respiratory: Negative for cough, chest tightness and shortness of breath.    Gastrointestinal: Positive for abdominal distention. Negative for abdominal pain, nausea and vomiting.   Skin: Negative for rash.     Objective:     Vital Signs (Most Recent):  Temp: 99.5 °F (37.5 °C) (10/14/17 1654)  Pulse: 110 (10/14/17 1654)  Resp: (!) 22 (10/14/17 1654)  BP: (!) 131/52 (10/14/17 1654)  SpO2: 97 % (10/14/17 1654) Vital Signs (24h Range):  Temp:  [98.8 °F (37.1 °C)-99.8 °F (37.7 °C)] 99.5 °F (37.5 °C)  Pulse:  [109-115] 110  Resp:  [18-22] 22  SpO2:  [96 %-100 %] 97 %  BP: (115-134)/(44-63) 131/52     Weight: (!) 205.8 kg (453 lb 11.3 oz)  Body mass index is 71.06 kg/m².    Estimated Creatinine Clearance: 82.5 mL/min (based on SCr of 2.3 mg/dL (H)).    Physical Exam   Constitutional: He is oriented to person, place, and time. He appears well-developed.   HENT:   Head: Normocephalic and atraumatic.   Eyes: EOM are normal. Pupils are equal, round, and reactive to light. Scleral icterus is present.   Neck: Normal range of motion.   Cardiovascular: Normal rate, regular rhythm and normal heart sounds.    Pulmonary/Chest: Effort normal and breath sounds normal.   Abdominal: Soft. Bowel sounds are normal. He exhibits distension. There is no tenderness. There is no guarding.   Musculoskeletal: Normal range of motion. He exhibits edema.   Neurological: He is alert and oriented to person, place, and time.   Skin: No rash noted.       Significant Labs:   Blood Culture:   Recent Labs  Lab 09/28/17  0030 09/28/17  0044 10/03/17  1015 10/03/17  1035 10/13/17  0934   LABBLOO No growth after 5 days. No growth after 5 days. No growth after 5 days. No growth after 5 days. No Growth to date  No Growth to date     BMP:   Recent Labs  Lab 10/14/17  0445  10/14/17  1622   * 181*   * 123*   K 3.9 4.0   CL 97 95   CO2 15* 16*   BUN 66* 74*   CREATININE 2.0* 2.3*   CALCIUM 8.3* 8.6*   MG 2.1  --      Microbiology Results (last 7 days)     Procedure Component Value Units Date/Time    Blood culture [[id number]] Collected:  10/13/17 0934    Order Status:  Completed Specimen:  Blood Updated:  10/14/17 1412     Blood Culture, Routine No Growth to date     Blood Culture, Routine No Growth to date    Urine culture [[id number]] Collected:  10/13/17 0927    Order Status:  Completed Specimen:  Urine from Urine, Clean Catch Updated:  10/14/17 1400     Urine Culture, Routine Multiple organisms isolated. None in predominance.  Repeat if     Urine Culture, Routine clinically necessary.    Culture, Body Fluid - Bactec [[id number]] Collected:  10/05/17 1436    Order Status:  Completed Specimen:  Body Fluid from Ascites Updated:  10/10/17 1812     Body Fluid Culture, Sterile No growth after 5 days.    Culture, Respiratory with Gram Stain [[id number]]  (Susceptibility) Collected:  10/06/17 1921    Order Status:  Completed Specimen:  Respiratory from Sputum Updated:  10/09/17 1032     Respiratory Culture --     KLEBSIELLA PNEUMONIAE ESBL  Many       Gram Stain (Respiratory) <10 epithelial cells per low power field     Gram Stain (Respiratory) No WBC's     Gram Stain (Respiratory) Few Gram negative rods    Blood culture [[id number]] Collected:  10/03/17 1015    Order Status:  Completed Specimen:  Blood from Peripheral, Hand, Left Updated:  10/08/17 2012     Blood Culture, Routine No growth after 5 days.    Narrative:       Blood cultures x 2 different sites. 4 bottles total. Please  draw cultures before administering antibiotics.    Blood culture [[id number]] Collected:  10/03/17 1035    Order Status:  Completed Specimen:  Blood from Peripheral, Hand, Right Updated:  10/08/17 1412     Blood Culture, Routine No growth after 5 days.    Narrative:       Blood cultures from 2 different  sites. 4 bottles total.  Please draw before starting antibiotics.          Significant Imaging: I have reviewed all pertinent imaging results/findings within the past 24 hours.

## 2017-10-14 NOTE — PROGRESS NOTES
"[facility]  Hepatology  Progress Note    Patient Name: Jhonny Diana  MRN: [id number]  Admission Date: 9/27/2017  Hospital Length of Stay: 17 days  Attending Provider: Armand Carrasco MD   Primary Care Physician: Provider Notinsystem  Principal Problem:Alcoholic hepatitis with ascites    Subjective:     Transplant status: No    HPI: 28 yo male with hx of childhood asthma and heavy alcohol abuse (fifth of liquor daily since teenager, last drink 2.5 wks ago per mother) who was transferred to [facility] for higher level of care/liver transplant evaluation.    History obtained per chart review and discussion with mother: Patient initially presented to OSH for a 2 mo hx of progressively worsening abdominal pain with associated fatigue, jaundice, and abdominal distention with ~20# weight gain.     The patient's mother states he began having abdominal pain about 6 months ago.  States he underwent many tests, all of which were inconclusive.  He then began to have abdominal swelling about two months ago and was in&out of the local ER.  He was still actively drinking up until about 2.5 weeks ago according to the mother but she is unsure of this.  At the OSH the patient underwent an EGD which showed g1EV.  He was also diagnosed with "pancreatitis" based on an elevated lipase.  The patient was also placed on prednisone given concerns for alcoholic hepatitis.           Interval History:   Patient mentating well but did complain of multiple BM overnight.    Current Facility-Administered Medications   Medication    0.9%  NaCl infusion (for blood administration)    albumin human 25% bottle 25 g    Amino acid 4.25% - dextrose 10% (CLINIMIX-E) solution with additives (1L provides 42.5 gm AA, 100 gm CHO (340 kcal/L dextrose), Na 35, K 30, Mg 5, Ca 4.5, Acetate 70, Cl 39, Phos 15)    fluconazole tablet 200 mg    furosemide injection 80 mg    hepatitis B virus vacc.rec(PF) injection 40 mcg    lactulose 20 gram/30 mL " solution Soln 20 g    octreotide injection 100 mcg    ondansetron injection 4 mg    pantoprazole EC tablet 40 mg    promethazine (PHENERGAN) 12.5 mg in dextrose 5 % 50 mL IVPB    rifAXIMin tablet 550 mg    sodium bicarbonate tablet 1,300 mg    sucralfate 100 mg/mL suspension 1 g    tramadol tablet 50 mg       Objective:     Vital Signs (Most Recent):  Temp: 99.4 °F (37.4 °C) (10/14/17 1407)  Pulse: (!) 113 (10/14/17 1500)  Resp: (!) 22 (10/14/17 1407)  BP: 129/63 (10/14/17 1407)  SpO2: 96 % (10/14/17 1407) Vital Signs (24h Range):  Temp:  [98.8 °F (37.1 °C)-99.8 °F (37.7 °C)] 99.4 °F (37.4 °C)  Pulse:  [109-115] 113  Resp:  [18-22] 22  SpO2:  [96 %-100 %] 96 %  BP: (115-134)/(44-63) 129/63     Weight: (!) 205.8 kg (453 lb 11.3 oz) (10/14/17 0300)  Body mass index is 71.06 kg/m².    Physical Exam   Constitutional: He is oriented to person, place, and time. No distress.   HENT:   Head: Normocephalic.   Eyes: Pupils are equal, round, and reactive to light. Scleral icterus is present.   Neck: Normal range of motion.   Cardiovascular:   Sinus tachycardia   Pulmonary/Chest:   Decreased breath sounds bilaterally   Abdominal: Bowel sounds are normal. He exhibits distension. He exhibits no mass. There is tenderness. There is no rebound and no guarding. No hernia.   Musculoskeletal: Normal range of motion. He exhibits edema.   Neurological: He is alert and oriented to person, place, and time.   Skin: He is not diaphoretic.       MELD-Na score: 35 at 10/14/2017  4:45 AM  MELD score: 31 at 10/14/2017  4:45 AM  Calculated from:  Serum Creatinine: 2.0 mg/dL at 10/14/2017  4:45 AM  Serum Sodium: 123 mmol/L (Rounded to 125) at 10/14/2017  4:45 AM  Total Bilirubin: 31.3 mg/dL at 10/14/2017  4:45 AM  INR(ratio): 1.6 at 10/14/2017  4:45 AM  Age: 27 years    Significant Labs:  CBC:   Recent Labs  Lab 10/14/17  0445   WBC 15.59*   RBC 1.84*   HGB 7.1*   HCT 19.9*        CMP:   Recent Labs  Lab 10/14/17  0445   *    CALCIUM 8.3*   ALBUMIN 2.5*   PROT 5.5*   *   K 3.9   CO2 15*   CL 97   BUN 66*   CREATININE 2.0*   ALKPHOS 118   ALT 60*   *   BILITOT 31.3*     Coagulation:   Recent Labs  Lab 10/14/17  0445   INR 1.6*       Significant Imaging:  No recent imaging studies to review     Assessment/Plan:     Decompensated hepatic cirrhosis with ascites and HE    27 year old male with a history of alcohol abuse transferred from [facility] for higher level of care. On admission patient came in with severe AH. Although patients mental status remains clear sodium and renal function are worsening. Ultimate cure is liver transplant yet our current recommendations are listed below.     Recommendations:  - Continue high protein diet  - Continue Lactulose (dose decreased) and Rifaximin  - Continue PPI and Carafate   - Restart Octreotide   - Start Lasix and Albumin  - Patient approved but now pending financial approval              Thank you for your consult. I will follow-up with patient. Please contact us if you have any additional questions.    Divya Booker M.D.  Gastroenterology Fellow, PGY-IV  Pager: [phone]  [facility]

## 2017-10-14 NOTE — PROGRESS NOTES
"[facility] Hospital Medicine  Progress Note    Patient Name: Jhonny Diana  MRN: [id number]  Patient Class: IP- Inpatient   Admission Date: 9/27/2017  Length of Stay: 16 days  Attending Physician: Armand Carrasco MD  Primary Care Provider: Provider [facility] Medicine Team: [facility] Armand Carrasco MD    Subjective:     Principal Problem:Alcoholic hepatitis with ascites    HPI:  26 yo male with hx of childhood asthma and heavy alcohol abuse (fifth of liquor daily since teenager, last drink 2.5 wks ago per mother) who was [place] [facility] [place]/liver transplant evaluation. Patient initially presented to OSH for a 2 mo hx of progressively worsening abdominal pain with associated fatigue, jaundice, and abdominal distention with ~20# weight gain. HPI given by patient's mother and OSH records as patient's participation in interview limited by previous administration of pain medication at OSH.      Per mother, patient did not have a fever, chills, dark stools, known sick contacts, nausea, vomiting, diarrhea, confusion, or HA. Reports family hx of alcoholic cirrhosis in father. Pt was recently diagnosed with alcoholic hepatitis during his admission at OSH on 9/6-9/15. During that hospitalization, patient was given ceftriaxone for SBP ppx, prednisone for alcoholic hepatitis, and diuresed. Pt was discharged and instructed to follow up with PCP and AA within 1 wk. After his discharge, patient returned to ED 4 days after for increased weight gain and worsening abdominal pain/jaundice. At OSH, patient was given fluids (for suspected pancreatitis), diuresed and given IV albumin. Two days prior to transfer [place] [facility] patient had an episode of hematemesis and underwent an EGD on 9/25 that revealed "1 sm varix in distal esophagus, no stigmata of bleeding, gastropathy of portal hypertension, and reflux esophagitis".  No further episodes of hematemesis noted since initial episode. "      Recent labs at OSH: Na 134, CO2 18, BUN 36, Cr 2, Tprot 5.9, albumin 1.9, Tbili 24, alk phos 83, , ALT51. Patient also had lipase 532 and ammonia 118 during his previous hospitalization at OSH. Labwork also notable for negative mitochondrial ab, sm muscle ab, and ceruloplasmin 22. Imaging at OSH revealing abdo U/S (9/20): hepatosplenomegaly, CT abdo w contrast: hepatomegaly with marked fatty infiltration of the liver and mild ascites (9/6). No CDs available for review.    Hospital Course:  Mr. Diana was admitted to the CMICU on 9/27 for decompensated alcoholic hepatitis with high MELD score, severe HE, and LAURA. Hepatology consulted and following to evaluate liver transplant candidacy. Patient not currently a transplant candidate due to recent alcohol use; recommending re-evaluation by addiction psych and social work when more medically stable and able to participate. Pt started on albumin and lasix for LAURA and possibility for HRS. However nephrology felt that LAURA is likely multifactorial: ATN given hypotensive episodes at OSH and toxic given severe hyperbilirubinemia; not truly HRS. Lactulose/rifaximin started for HE and mental status improving slowly. Mr. Diana has had significant ABD pain since admission with ABD distension. CT abdomen was significant for pericolonic fat stranding of right colon; no obstruction. Also with diffuse mesenteric edema. No significant ascites noted, no cholecystitis/cholelithiasis/ductal dilation. US liver with doppler showed appropriate vasculature.  He was stepped down to Hospital Medicine on 10/5.  Paracentesis was done on 10/9 with 3.3L removed.  His diet was advanced and Dietary was consulted to help get adequate caloric intake.    Interval History: patient had low grade fever over night of 100.6, unclear source; started on broad spectrum abx  - patient has been approved for liver tx listing, likely will be listed on Monday; patient is on RA doing much better today;  having multiple BMs    Review of Systems   Constitutional: Negative for chills, fatigue and fever.   HENT: Negative for sore throat and trouble swallowing.    Eyes: Negative for photophobia and visual disturbance.   Respiratory: Negative for cough and shortness of breath.    Cardiovascular: Negative for chest pain, palpitations and leg swelling.   Gastrointestinal: Positive for abdominal distention. Negative for abdominal pain, constipation, diarrhea, nausea and vomiting.   Endocrine: Negative for cold intolerance and heat intolerance.   Genitourinary: Negative for dysuria and frequency.   Musculoskeletal: Negative for arthralgias and myalgias.   Skin: Positive for color change. Negative for rash and wound.   Neurological: Negative for dizziness, syncope, weakness and light-headedness.   Psychiatric/Behavioral: Negative for confusion and hallucinations.   All other systems reviewed and are negative.    Objective:     Vital Signs (Most Recent):  Temp: 99.8 °F (37.7 °C) (10/13/17 2012)  Pulse: 110 (10/13/17 2012)  Resp: 20 (10/13/17 2012)  BP: (!) 129/54 (10/13/17 2012)  SpO2: 100 % (10/13/17 2012) Vital Signs (24h Range):  Temp:  [99.8 °F (37.7 °C)-100.6 °F (38.1 °C)] 99.8 °F (37.7 °C)  Pulse:  [] 110  Resp:  [16-20] 20  SpO2:  [95 %-100 %] 100 %  BP: (126-135)/(54-59) 129/54     Weight: 90.4 kg (199 lb 4.7 oz)  Body mass index is 31.21 kg/m².    Intake/Output Summary (Last 24 hours) at 10/13/17 2136  Last data filed at 10/13/17 0400   Gross per 24 hour   Intake                0 ml   Output              280 ml   Net             -280 ml      Physical Exam   Constitutional: He appears well-developed and well-nourished. He is cooperative.  Non-toxic appearance. No distress.   HENT:   Head: Normocephalic and atraumatic.   Mouth/Throat: Oropharynx is clear and moist.   Eyes: EOM are normal. Pupils are equal, round, and reactive to light. Scleral icterus is present.   Neck: Normal range of motion. Neck supple.    Cardiovascular: Regular rhythm, normal heart sounds and normal pulses.  Tachycardia present.    No murmur heard.  Sinus tachycardia   Pulmonary/Chest: Effort normal. No respiratory distress. He has decreased breath sounds in the right lower field and the left lower field. He has no wheezes. He has no rales.   Abdominal: Soft. Normal appearance. He exhibits distension. He exhibits no mass. Bowel sounds are decreased. There is no tenderness. There is no rebound and no guarding. No hernia.   Musculoskeletal: Normal range of motion. He exhibits no edema.   Lymphadenopathy:     He has no cervical adenopathy.   Neurological: He is alert. He is not disoriented.   Oriented to person and place today   Skin: Skin is warm and dry. He is not diaphoretic.   Old blister/ wart on right plantar surface of the foot near the big toe   Psychiatric: He has a normal mood and affect. His behavior is normal. His affect is not labile. His speech is delayed. Cognition and memory are not impaired.   Nursing note and vitals reviewed.      Significant Labs:   CBC:     Recent Labs  Lab 10/12/17  0558 10/13/17  0543   WBC 19.42* 15.92*   HGB 7.6* 7.4*   HCT 21.5* 20.6*    165     CMP:     Recent Labs  Lab 10/12/17  0558 10/13/17  0543   * 126*   K 3.6 3.8    99   CO2 15* 14*   * 141*   BUN 51* 53*   CREATININE 1.8* 1.9*   CALCIUM 8.6* 8.5*   PROT 5.6* 5.6*   ALBUMIN 2.7* 2.8*   BILITOT 31.0* 31.7*   ALKPHOS 125 124   * 141*   ALT 63* 64*   ANIONGAP 11 13   EGFRNONAA 50.1* 46.9*     Coagulation:     Recent Labs  Lab 10/13/17  0543   INR 1.5*     All pertinent labs within the past 24 hours have been reviewed.    Significant Imaging: CXR: I have reviewed all pertinent results/findings within the past 24 hours and my personal findings are:  NG in appropriate position    Assessment/Plan:      * Alcoholic hepatitis with ascites    · Appreciate Hepatology assistance  · Continue Lactulose and Rifaximin for  HE  · Currently not transplant candidate per Psych and Hep.  Addiction Psych consulted  · IR para 10/9- 7.8 L fluid removed negative for SBP  · Stressed the importance of high calorie diet to prevent NGT insertion again - Boost TID  · Patient agreed to due alcohol rehab if patient was to get a liver transplant; we have initiated inpatient liver transplant evaluation;   · Patient has been accepted for liver transplant, likely will be listed monday    MELD-Na score: 34 at 10/13/2017  5:43 AM  MELD score: 30 at 10/13/2017  5:43 AM  Calculated from:  Serum Creatinine: 1.9 mg/dL at 10/13/2017  5:43 AM  Serum Sodium: 126 mmol/L at 10/13/2017  5:43 AM  Total Bilirubin: 31.7 mg/dL at 10/13/2017  5:43 AM  INR(ratio): 1.5 at 10/13/2017  5:43 AM  Age: 27 years            Ascites due to alcoholic cirrhosis    As above          Severe malnutrition    · SLP consulted and ok for dental soft diet  · Dietary consulted  · Stressed the importance of eating high protein high calorie to prevent NGT re-insertion with TF  · Calorie counts and Boost TID  · Started patient on PPN on 10/11, continue         Coagulopathy    · 2/2 liver disease  · No bleeding; Monitor          Leukocytosis    · WBC remains elevated - stable at 26  · Remains afebrile  · Repeat blood cultures NGTD  · UA negative  · Resp cx ESBL Klebsiella.  ID following.  Will discuss antibiotics        Metabolic acidosis with normal anion gap and bicarbonate losses    · 2/2 diarrhea given lactulose.  Currently on 10g daily with improving BMs  · Still acidotic, will monitor  · Sodium Bicarb TID          Decompensated hepatic cirrhosis with ascites and HE    See alcoholic hepatitis with ascites          Anasarca    · 2/2 liver disease and hypoalbuminemia          Hyponatremia    · Sodium 126 today, will fluid restrict to 1L and give albumin          History of hematemesis    · Pt had an episode of hematemesis at OSH and EGD that revealed 1 small varix, reflux esophagitis, and  gastropathy of portal hypertension  · No evidence of bleeding currently and relatively stable hemgolobin  · Continue Pantoprazole BID        Acute renal failure with tubular necrosis    · Appreciate Nephro's help  · Suspect LAURA multifactorial: ischemic ATN given hypotension and injury due to hyperbilirubinemia. Felt less likely to be HRS however have continued albumin with lasix  · Overall, LAURA improving and making adequate urine.  Not measuring amounts as he pulls off condom catheter and is in diapers - but mother reports frequent wet diapers    10/12- renal function worsening, will re-consult nephrology; adding octreotide; strict I/O; decrease UOP        Hepatic encephalopathy    · Mentation improving  · Continue Lactulose and Rifaxmin    10/10 - mentation AAO times 4; only one BM today; will schedule lactulose today          Alcoholism /alcohol abuse    · Reported about a fifth of whiskey daily since teenager  · Appreciate Psych and Hep assistance; currently high risk for liver transplant.  Addiction Psych reconsulted, state prognosis is gaurded however would need ABU if transplant is need urgently which patient has agreed to do  · PETH 174 on admit, 9/27          VTE Risk Mitigation         Ordered     heparin (porcine) injection 5,000 Units  Every 8 hours     Route:  Subcutaneous        10/03/17 1740     Place sequential compression device  Until discontinued      09/28/17 0238     Medium Risk of VTE  Once      09/27/17 6908              Armand Carrasco MD  Department of Hospital Medicine   [facility]

## 2017-10-14 NOTE — PLAN OF CARE
Problem: Patient Care Overview  Goal: Plan of Care Review  Outcome: Ongoing (interventions implemented as appropriate)  POC reviewed w/ pt this am to include pain control, possible blood transfusion, promoting nutrition, increase activity, and safety.  Pt still having abdominal discomfort r/t ascites - abdomen distended.  Pt's appetite poor, unable to eat d/t abdominal fullness.  Pt receiving PPN at 100 cc/hr.  Pt received 1 unit PRBCs today.  Pt also give IV lasix and albumin for fluid volume overload.  BMP drawn at 1600, pt remain hyponatremic and creatinine rising.  Pt ambulated in [place] this afternoon w/ stand by assist and walker.  Pt was notified today his transplant was approved per the committee.  Pt waiting to be listed.

## 2017-10-14 NOTE — ASSESSMENT & PLAN NOTE
· Appreciate Hepatology assistance  · Continue Lactulose and Rifaximin for HE  · Currently not transplant candidate per Psych and Hep.  Addiction Psych consulted  · IR para 10/9- 7.8 L fluid removed negative for SBP  · Stressed the importance of high calorie diet to prevent NGT insertion again - Boost TID  · Patient agreed to due alcohol rehab if patient was to get a liver transplant; we have initiated inpatient liver transplant evaluation;   · Patient has been accepted for liver transplant, likely will be listed monday    MELD-Na score: 35 at 10/14/2017  4:45 AM  MELD score: 31 at 10/14/2017  4:45 AM  Calculated from:  Serum Creatinine: 2.0 mg/dL at 10/14/2017  4:45 AM  Serum Sodium: 123 mmol/L (Rounded to 125) at 10/14/2017  4:45 AM  Total Bilirubin: 31.3 mg/dL at 10/14/2017  4:45 AM  INR(ratio): 1.6 at 10/14/2017  4:45 AM  Age: 27 years

## 2017-10-14 NOTE — PLAN OF CARE
Problem: Patient Care Overview  Goal: Plan of Care Review  Outcome: Ongoing (interventions implemented as appropriate)  Plan of care reviewed with patient. Pt verbalized understanding. Pt AAOX4. Pt free from falls, injuries and trauma.  Pt free from skin breakdown.  Pt VSS and in NAD.  Pt afebrile.  Pt had no complaints of SOB, N/V or CP.  Pt had no complaint of pain this shift. Pt up to BS with assistance. Adequate UOP this shift. 3 BM this shift. Pt c/o nausea. Antiemetic given with full relief. Clinimix-E continous at 100ml/hr. Pt had uneventful evening. Pt in low locked bed with personal items and call light within reach. Fall precautions maintained. Mother remained at bedside.

## 2017-10-14 NOTE — PROGRESS NOTES
"[facility] Hospital Medicine  Progress Note    Patient Name: Jhonny Diana  MRN: [id number]  Patient Class: IP- Inpatient   Admission Date: 9/27/2017  Length of Stay: 17 days  Attending Physician: Armand Carrasco MD  Primary Care Provider: Provider [facility] Medicine Team: [facility] HOSP MED A Armand Carrasco MD    Subjective:     Principal Problem:Alcoholic hepatitis with ascites    HPI:  26 yo male with hx of childhood asthma and heavy alcohol abuse (fifth of liquor daily since teenager, last drink 2.5 wks ago per mother) who was transferred to [facility] for higher level of care/liver transplant evaluation. Patient initially presented to OSH for a 2 mo hx of progressively worsening abdominal pain with associated fatigue, jaundice, and abdominal distention with ~20# weight gain. HPI given by patient's mother and OSH records as patient's participation in interview limited by previous administration of pain medication at OSH.      Per mother, patient did not have a fever, chills, dark stools, known sick contacts, nausea, vomiting, diarrhea, confusion, or HA. Reports family hx of alcoholic cirrhosis in father. Pt was recently diagnosed with alcoholic hepatitis during his admission at OSH on 9/6-9/15. During that hospitalization, patient was given ceftriaxone for SBP ppx, prednisone for alcoholic hepatitis, and diuresed. Pt was discharged and instructed to follow up with PCP and AA within 1 wk. After his discharge, patient returned to ED 4 days after for increased weight gain and worsening abdominal pain/jaundice. At OSH, patient was given fluids (for suspected pancreatitis), diuresed and given IV albumin. Two days prior to transfer to [facility], patient had an episode of hematemesis and underwent an EGD on 9/25 that revealed "1 sm varix in distal esophagus, no stigmata of bleeding, gastropathy of portal hypertension, and reflux esophagitis".  No further episodes of hematemesis noted since initial episode. "      Recent labs at OSH: Na 134, CO2 18, BUN 36, Cr 2, Tprot 5.9, albumin 1.9, Tbili 24, alk phos 83, , ALT51. Patient also had lipase 532 and ammonia 118 during his previous hospitalization at OSH. Labwork also notable for negative mitochondrial ab, sm muscle ab, and ceruloplasmin 22. Imaging at OSH revealing abdo U/S (9/20): hepatosplenomegaly, CT abdo w contrast: hepatomegaly with marked fatty infiltration of the liver and mild ascites (9/6). No CDs available for review.    Hospital Course:  Mr. Diana was admitted to the CMICU on 9/27 for decompensated alcoholic hepatitis with high MELD score, severe HE, and LAURA. Hepatology consulted and following to evaluate liver transplant candidacy. Patient not currently a transplant candidate due to recent alcohol use; recommending re-evaluation by addiction psych and social work when more medically stable and able to participate. Pt started on albumin and lasix for LAURA and possibility for HRS. However nephrology felt that LAURA is likely multifactorial: ATN given hypotensive episodes at OSH and toxic given severe hyperbilirubinemia; not truly HRS. Lactulose/rifaximin started for HE and mental status improving slowly. Mr. Diana has had significant ABD pain since admission with ABD distension. CT abdomen was significant for pericolonic fat stranding of right colon; no obstruction. Also with diffuse mesenteric edema. No significant ascites noted, no cholecystitis/cholelithiasis/ductal dilation. US liver with doppler showed appropriate vasculature.  He was stepped down to Hospital Medicine on 10/5.  Paracentesis was done on 10/9 with 3.3L removed.  His diet was advanced and Dietary was consulted to help get adequate caloric intake.    Interval History: patient was accepted for liver transplant; planning for listing on Monday  - patient has not spiked a fever since yesterday; spoke with transplant ID, plan on holding vanc and unasyn today and monitor  - sodium has dropped  and Cr continues to trend up; will start albumin and lasix; will also transfuse 1 unit of PRBC today and monitor; patient is having 6 to 8 BMs daily; will titrate down on lactulose; only needs to have 3 to 4 BMs daily    Review of Systems   Constitutional: Negative for chills, fatigue and fever.   HENT: Negative for sore throat and trouble swallowing.    Eyes: Negative for photophobia and visual disturbance.   Respiratory: Negative for cough and shortness of breath.    Cardiovascular: Negative for chest pain, palpitations and leg swelling.   Gastrointestinal: Positive for abdominal distention. Negative for abdominal pain, constipation, diarrhea, nausea and vomiting.   Endocrine: Negative for cold intolerance and heat intolerance.   Genitourinary: Negative for dysuria and frequency.   Musculoskeletal: Negative for arthralgias and myalgias.   Skin: Positive for color change. Negative for rash and wound.   Neurological: Negative for dizziness, syncope, weakness and light-headedness.   Psychiatric/Behavioral: Negative for confusion and hallucinations.   All other systems reviewed and are negative.    Objective:     Vital Signs (Most Recent):  Temp: 99.4 °F (37.4 °C) (10/14/17 1407)  Pulse: (!) 113 (10/14/17 1500)  Resp: (!) 22 (10/14/17 1407)  BP: 129/63 (10/14/17 1407)  SpO2: 96 % (10/14/17 1407) Vital Signs (24h Range):  Temp:  [98.8 °F (37.1 °C)-99.8 °F (37.7 °C)] 99.4 °F (37.4 °C)  Pulse:  [109-115] 113  Resp:  [18-22] 22  SpO2:  [96 %-100 %] 96 %  BP: (115-134)/(44-63) 129/63     Weight: (!) 205.8 kg (453 lb 11.3 oz)  Body mass index is 71.06 kg/m².    Intake/Output Summary (Last 24 hours) at 10/14/17 1611  Last data filed at 10/14/17 1500   Gross per 24 hour   Intake             2030 ml   Output              100 ml   Net             1930 ml      Physical Exam   Constitutional: He appears well-developed and well-nourished. He is cooperative.  Non-toxic appearance. No distress.   HENT:   Head: Normocephalic and  atraumatic.   Mouth/Throat: Oropharynx is clear and moist.   Eyes: EOM are normal. Pupils are equal, round, and reactive to light. Scleral icterus is present.   Neck: Normal range of motion. Neck supple.   Cardiovascular: Regular rhythm, normal heart sounds and normal pulses.  Tachycardia present.    No murmur heard.  Sinus tachycardia   Pulmonary/Chest: Effort normal. No respiratory distress. He has decreased breath sounds in the right lower field and the left lower field. He has no wheezes. He has no rales.   Abdominal: Soft. Normal appearance. He exhibits distension. He exhibits no mass. Bowel sounds are decreased. There is no tenderness. There is no rebound and no guarding. No hernia.   Musculoskeletal: Normal range of motion. He exhibits no edema.   Lymphadenopathy:     He has no cervical adenopathy.   Neurological: He is alert. He is not disoriented.   Oriented to person and place today   Skin: Skin is warm and dry. He is not diaphoretic.   Old blister/ wart on right plantar surface of the foot near the big toe   Psychiatric: He has a normal mood and affect. His behavior is normal. His affect is not labile. His speech is delayed. Cognition and memory are not impaired.   Nursing note and vitals reviewed.      Significant Labs:   CBC:     Recent Labs  Lab 10/13/17  0543 10/14/17  0445   WBC 15.92* 15.59*   HGB 7.4* 7.1*   HCT 20.6* 19.9*    167     CMP:     Recent Labs  Lab 10/13/17  0543 10/14/17  0445   * 123*   K 3.8 3.9   CL 99 97   CO2 14* 15*   * 130*   BUN 53* 66*   CREATININE 1.9* 2.0*   CALCIUM 8.5* 8.3*   PROT 5.6* 5.5*   ALBUMIN 2.8* 2.5*   BILITOT 31.7* 31.3*   ALKPHOS 124 118   * 118*   ALT 64* 60*   ANIONGAP 13 11   EGFRNONAA 46.9* 44.1*     Coagulation:     Recent Labs  Lab 10/14/17  0445   INR 1.6*     All pertinent labs within the past 24 hours have been reviewed.    Significant Imaging: CXR: I have reviewed all pertinent results/findings within the past 24 hours and  my personal findings are:  NG in appropriate position    Assessment/Plan:      * Alcoholic hepatitis with ascites    · Appreciate Hepatology assistance  · Continue Lactulose and Rifaximin for HE  · Currently not transplant candidate per Psych and Hep.  Addiction Psych consulted  · IR para 10/9- 7.8 L fluid removed negative for SBP  · Stressed the importance of high calorie diet to prevent NGT insertion again - Boost TID  · Patient agreed to due alcohol rehab if patient was to get a liver transplant; we have initiated inpatient liver transplant evaluation;   · Patient has been accepted for liver transplant, likely will be listed monday    MELD-Na score: 35 at 10/14/2017  4:45 AM  MELD score: 31 at 10/14/2017  4:45 AM  Calculated from:  Serum Creatinine: 2.0 mg/dL at 10/14/2017  4:45 AM  Serum Sodium: 123 mmol/L (Rounded to 125) at 10/14/2017  4:45 AM  Total Bilirubin: 31.3 mg/dL at 10/14/2017  4:45 AM  INR(ratio): 1.6 at 10/14/2017  4:45 AM  Age: 27 years            Ascites due to alcoholic cirrhosis    As above    S/p paracentesis on 10/12 with 1.7 L removed; adding lasix         Severe malnutrition    · SLP consulted and ok for dental soft diet  · Dietary consulted  · Stressed the importance of eating high protein high calorie to prevent NGT re-insertion with TF  · Calorie counts and Boost TID  · Started patient on PPN on 10/11, continue         Coagulopathy    · 2/2 liver disease  · No bleeding; Monitor          Leukocytosis    · WBC remains elevated - stable at 26  · Remains afebrile  · Repeat blood cultures NGTD  · UA negative  · Resp cx ESBL Klebsiella.  ID following.  Will discuss antibiotics        Metabolic acidosis with normal anion gap and bicarbonate losses    · 2/2 diarrhea given lactulose.  Currently on 10g daily with improving BMs  · Still acidotic, will monitor  · Sodium Bicarb TID          Decompensated hepatic cirrhosis with ascites and HE    See alcoholic hepatitis with ascites          Anasarca     · 2/2 liver disease and hypoalbuminemia          Hyponatremia    · Trending down; will add albumin today and give lasix and monitor           History of hematemesis    · Pt had an episode of hematemesis at OSH and EGD that revealed 1 small varix, reflux esophagitis, and gastropathy of portal hypertension  · No evidence of bleeding currently and relatively stable hemgolobin  · Continue Pantoprazole BID        Acute renal failure with tubular necrosis    · Appreciate Nephro's help  · Suspect LAURA multifactorial: ischemic ATN given hypotension and injury due to hyperbilirubinemia. Felt less likely to be HRS however have continued albumin with lasix  · Overall, LAURA improving and making adequate urine.  Not measuring amounts as he pulls off condom catheter and is in diapers - but mother reports frequent wet diapers    10/12- renal function worsening, will re-consult nephrology; adding octreotide; strict I/O; decrease UOP    10/14- will transfuse 1 unit of PRBC to help perfusion and add back octreotide and albumin        Hepatic encephalopathy    · Mentation improving  · Continue Lactulose and Rifaxmin    10/10 - mentation AAO times 4; only one BM today; will schedule lactulose today    10/14- titrate down lactulose to only have 3 to 4 BMS daily          Alcoholism /alcohol abuse    · Reported about a fifth of whiskey daily since teenager  · Appreciate Psych and Hep assistance; currently high risk for liver transplant.  Addiction Psych reconsulted, state prognosis is gaurded however would need ABU if transplant is need urgently which patient has agreed to do  · PETH 174 on admit, 9/27          VTE Risk Mitigation         Ordered     Place sequential compression device  Until discontinued      09/28/17 0238     Medium Risk of VTE  Once      09/27/17 9453              Armand Carrasco MD  Department of Hospital Medicine   [facility]

## 2017-10-14 NOTE — ASSESSMENT & PLAN NOTE
· SLP consulted and ok for dental soft diet  · Dietary consulted  · Stressed the importance of eating high protein high calorie to prevent NGT re-insertion with TF  · Calorie counts and Boost TID  · Started patient on PPN on 10/11, continue

## 2017-10-14 NOTE — SUBJECTIVE & OBJECTIVE
Interval History: patient had low grade fever over night of 100.6, unclear source; started on broad spectrum abx  - patient has been approved for liver tx listing, likely will be listed on Monday; patient is on RA doing much better today; having multiple BMs    Review of Systems   Constitutional: Negative for chills, fatigue and fever.   HENT: Negative for sore throat and trouble swallowing.    Eyes: Negative for photophobia and visual disturbance.   Respiratory: Negative for cough and shortness of breath.    Cardiovascular: Negative for chest pain, palpitations and leg swelling.   Gastrointestinal: Positive for abdominal distention. Negative for abdominal pain, constipation, diarrhea, nausea and vomiting.   Endocrine: Negative for cold intolerance and heat intolerance.   Genitourinary: Negative for dysuria and frequency.   Musculoskeletal: Negative for arthralgias and myalgias.   Skin: Positive for color change. Negative for rash and wound.   Neurological: Negative for dizziness, syncope, weakness and light-headedness.   Psychiatric/Behavioral: Negative for confusion and hallucinations.   All other systems reviewed and are negative.    Objective:     Vital Signs (Most Recent):  Temp: 99.8 °F (37.7 °C) (10/13/17 2012)  Pulse: 110 (10/13/17 2012)  Resp: 20 (10/13/17 2012)  BP: (!) 129/54 (10/13/17 2012)  SpO2: 100 % (10/13/17 2012) Vital Signs (24h Range):  Temp:  [99.8 °F (37.7 °C)-100.6 °F (38.1 °C)] 99.8 °F (37.7 °C)  Pulse:  [] 110  Resp:  [16-20] 20  SpO2:  [95 %-100 %] 100 %  BP: (126-135)/(54-59) 129/54     Weight: 90.4 kg (199 lb 4.7 oz)  Body mass index is 31.21 kg/m².    Intake/Output Summary (Last 24 hours) at 10/13/17 2735  Last data filed at 10/13/17 0400   Gross per 24 hour   Intake                0 ml   Output              280 ml   Net             -280 ml      Physical Exam   Constitutional: He appears well-developed and well-nourished. He is cooperative.  Non-toxic appearance. No distress.    HENT:   Head: Normocephalic and atraumatic.   Mouth/Throat: Oropharynx is clear and moist.   Eyes: EOM are normal. Pupils are equal, round, and reactive to light. Scleral icterus is present.   Neck: Normal range of motion. Neck supple.   Cardiovascular: Regular rhythm, normal heart sounds and normal pulses.  Tachycardia present.    No murmur heard.  Sinus tachycardia   Pulmonary/Chest: Effort normal. No respiratory distress. He has decreased breath sounds in the right lower field and the left lower field. He has no wheezes. He has no rales.   Abdominal: Soft. Normal appearance. He exhibits distension. He exhibits no mass. Bowel sounds are decreased. There is no tenderness. There is no rebound and no guarding. No hernia.   Musculoskeletal: Normal range of motion. He exhibits no edema.   Lymphadenopathy:     He has no cervical adenopathy.   Neurological: He is alert. He is not disoriented.   Oriented to person and place today   Skin: Skin is warm and dry. He is not diaphoretic.   Old blister/ wart on right plantar surface of the foot near the big toe   Psychiatric: He has a normal mood and affect. His behavior is normal. His affect is not labile. His speech is delayed. Cognition and memory are not impaired.   Nursing note and vitals reviewed.      Significant Labs:   CBC:     Recent Labs  Lab 10/12/17  0558 10/13/17  0543   WBC 19.42* 15.92*   HGB 7.6* 7.4*   HCT 21.5* 20.6*    165     CMP:     Recent Labs  Lab 10/12/17  0558 10/13/17  0543   * 126*   K 3.6 3.8    99   CO2 15* 14*   * 141*   BUN 51* 53*   CREATININE 1.8* 1.9*   CALCIUM 8.6* 8.5*   PROT 5.6* 5.6*   ALBUMIN 2.7* 2.8*   BILITOT 31.0* 31.7*   ALKPHOS 125 124   * 141*   ALT 63* 64*   ANIONGAP 11 13   EGFRNONAA 50.1* 46.9*     Coagulation:     Recent Labs  Lab 10/13/17  0543   INR 1.5*     All pertinent labs within the past 24 hours have been reviewed.    Significant Imaging: CXR: I have reviewed all pertinent  results/findings within the past 24 hours and my personal findings are:  NG in appropriate position

## 2017-10-14 NOTE — ASSESSMENT & PLAN NOTE
· Appreciate Hepatology assistance  · Continue Lactulose and Rifaximin for HE  · Currently not transplant candidate per Psych and Hep.  Addiction Psych consulted  · IR para 10/9- 7.8 L fluid removed negative for SBP  · Stressed the importance of high calorie diet to prevent NGT insertion again - Boost TID  · Patient agreed to due alcohol rehab if patient was to get a liver transplant; we have initiated inpatient liver transplant evaluation;   · Patient has been accepted for liver transplant, likely will be listed monday    MELD-Na score: 34 at 10/13/2017  5:43 AM  MELD score: 30 at 10/13/2017  5:43 AM  Calculated from:  Serum Creatinine: 1.9 mg/dL at 10/13/2017  5:43 AM  Serum Sodium: 126 mmol/L at 10/13/2017  5:43 AM  Total Bilirubin: 31.7 mg/dL at 10/13/2017  5:43 AM  INR(ratio): 1.5 at 10/13/2017  5:43 AM  Age: 27 years

## 2017-10-14 NOTE — PT/OT/SLP PROGRESS
"Physical Therapy  Treatment    Jhonny Diana   MRN: [id number]   Admitting Diagnosis: Alcoholic hepatitis with ascites    PT Received On: 10/13/17  PT Start Time: 1731     PT Stop Time: 1815    PT Total Time (min): 44 min       Billable Minutes:  Gait [id number] and Therapeutic Exercise 29    Treatment Type: Treatment  PT/PTA: PT     PTA Visit Number: 0       General Precautions: Standard, contact, fall  Orthopedic Precautions: N/A   Braces:      Do you have any cultural, spiritual, Anglican conflicts, given your current situation?: none reported     Subjective:  Communicated with nursing prior to session.  "I would do it if I didn't have this pregnant belly."         Objective:        Functional Mobility:  Bed Mobility:    Sit-->supine: 2xs, S with HOB elevated to 35 deg, Monty with HOB elevated to 25 deg  Supine-->sit: 2xs with SBA  Positioning: with Monty in supine    Transfers:   Sit<>stand: SBA, with RW in stance for support  Stand pivot transfer: with SBA/CGA with RW    Gait:    Pt amb 184', 36', 50' with standing rest breaks, with RW, with SBA/CGA, 1 episode of LOB with turning    Balance:   Static Sit: FAIR+: Able to take MINIMAL challenges from all directions  Dynamic Sit: FAIR+: Maintains balance through MINIMAL excursions of active trunk motion  Static Stand: FAIR: Maintains without assist but unable to take challenges  Dynamic stand: FAIR: Needs CONTACT GUARD during gait     Therapeutic Activities and Exercises:  Whiteboard updated  Ankle pumps: 20 reps x 2 sets  SLR: 20 reps each LE perf individually, in supine, x 2 sets  Heel slides: 20 reps each LE perf individually, in supine x 2 sets  Hip abd/add: 20 reps each LE perf individually, in supine x 2 sets  SKTC: 20 reps each LE perf individually, in hooklying x 2 sets     AM-PAC 6 CLICK MOBILITY  How much help from another person does this patient currently need?   1 = Unable, Total/Dependent Assistance  2 = A lot, Maximum/Moderate Assistance  3 = A little, " Minimum/Contact Guard/Supervision  4 = None, Modified [place]/Independent         AM-PAC Raw Score CMS G-Code Modifier Level of Impairment Assistance   6 % Total / Unable   7 - 9 CM 80 - 100% Maximal Assist   10 - 14 CL 60 - 80% Moderate Assist   15 - 19 CK 40 - 60% Moderate Assist   20 - 22 CJ 20 - 40% Minimal Assist   23 CI 1-20% SBA / CGA   24 CH 0% Independent/ Mod I     Patient left supine with all lines intact, call button in reach and nursing, mother, and transport present.    Assessment:  Jhonny Diana is a 27 y.o. male with a medical diagnosis of Alcoholic hepatitis with ascites.  Pt has made improvements in regards to functional mobility.  1 episode of LOB with turning during gait training.  Pt is to be discharged to his mother's house, with 2-3 ALEXA.  Will address stair ambulation in the following tx session.  Change to POC, current recommendation for pt to receive skilled PT services in the home setting.  No rehab needs at this time.  Pt will cont to benefit from skilled PT services in the acute setting to address functional transfer and balance deficits.      Rehab identified problem list/impairments: Rehab identified problem list/impairments: weakness, impaired endurance, impaired cardiopulmonary response to activity, edema, pain, gait instability, impaired balance, impaired functional mobilty, impaired self care skills    Rehab potential is fair.    Activity tolerance: Fair    Discharge recommendations: Discharge Facility/Level Of Care Needs: home health PT     Barriers to discharge: Barriers to Discharge: None    Equipment recommendations: Equipment Needed After Discharge: none     GOALS:    Physical Therapy Goals        Problem: Physical Therapy Goal    Goal Priority Disciplines Outcome Goal Variances Interventions   Physical Therapy Goal     PT/OT, PT Ongoing (interventions implemented as appropriate)     Description:  Goals to be met by: 10/16/2017    Patient will increase functional  independence with mobility by performin. Supine to sit with MInimal Assistance Met 10/9/17   1A. supine to sit with SBA- Met   1B. Mod I   2. Sit to supine with MInimal Assistance - GOAL MET  3. Sit to stand transfer with Minimal Assistance - met 10/5   3A: Sit to stand transfer with SBA using LRAD or no AD. Met 10/11/17  4. Bed to chair transfer with Moderate Assistance using LRAD or no AD.- Met   4A. SBA using LRAD  5. Gait  x 20 feet with Moderate Assistance using LRAD or no Ad. Met 10/9/17   5A: Gait x150 feet with SBA using LRAD or no AD. Met 10/11/2017 with RW  6. Ascend/descend 4 stair with bilateral Handrails Moderate Assistance using LRAD or no AD.  7. Stand for 8 minutes with Minimal Assistance using LRAD or no AD. - GOAL MET  8. Lower extremity exercise program x20 reps per handout, with independence                           PLAN:    Patient to be seen 4 x/week  to address the above listed problems via gait training, therapeutic activities, therapeutic exercises, neuromuscular re-education  Plan of Care expires: 17  Plan of Care reviewed with: patient, mother         Tory Amato, PT  10/14/2017

## 2017-10-14 NOTE — SUBJECTIVE & OBJECTIVE
Interval History: patient was accepted for liver transplant; planning for listing on Monday  - patient has not spiked a fever since yesterday; spoke with transplant ID, plan on holding vanc and unasyn today and monitor  - sodium has dropped and Cr continues to trend up; will start albumin and lasix; will also transfuse 1 unit of PRBC today and monitor; patient is having 6 to 8 BMs daily; will titrate down on lactulose; only needs to have 3 to 4 BMs daily    Review of Systems   Constitutional: Negative for chills, fatigue and fever.   HENT: Negative for sore throat and trouble swallowing.    Eyes: Negative for photophobia and visual disturbance.   Respiratory: Negative for cough and shortness of breath.    Cardiovascular: Negative for chest pain, palpitations and leg swelling.   Gastrointestinal: Positive for abdominal distention. Negative for abdominal pain, constipation, diarrhea, nausea and vomiting.   Endocrine: Negative for cold intolerance and heat intolerance.   Genitourinary: Negative for dysuria and frequency.   Musculoskeletal: Negative for arthralgias and myalgias.   Skin: Positive for color change. Negative for rash and wound.   Neurological: Negative for dizziness, syncope, weakness and light-headedness.   Psychiatric/Behavioral: Negative for confusion and hallucinations.   All other systems reviewed and are negative.    Objective:     Vital Signs (Most Recent):  Temp: 99.4 °F (37.4 °C) (10/14/17 1407)  Pulse: (!) 113 (10/14/17 1500)  Resp: (!) 22 (10/14/17 1407)  BP: 129/63 (10/14/17 1407)  SpO2: 96 % (10/14/17 1407) Vital Signs (24h Range):  Temp:  [98.8 °F (37.1 °C)-99.8 °F (37.7 °C)] 99.4 °F (37.4 °C)  Pulse:  [109-115] 113  Resp:  [18-22] 22  SpO2:  [96 %-100 %] 96 %  BP: (115-134)/(44-63) 129/63     Weight: (!) 205.8 kg (453 lb 11.3 oz)  Body mass index is 71.06 kg/m².    Intake/Output Summary (Last 24 hours) at 10/14/17 1611  Last data filed at 10/14/17 1500   Gross per 24 hour   Intake              2030 ml   Output              100 ml   Net             1930 ml      Physical Exam   Constitutional: He appears well-developed and well-nourished. He is cooperative.  Non-toxic appearance. No distress.   HENT:   Head: Normocephalic and atraumatic.   Mouth/Throat: Oropharynx is clear and moist.   Eyes: EOM are normal. Pupils are equal, round, and reactive to light. Scleral icterus is present.   Neck: Normal range of motion. Neck supple.   Cardiovascular: Regular rhythm, normal heart sounds and normal pulses.  Tachycardia present.    No murmur heard.  Sinus tachycardia   Pulmonary/Chest: Effort normal. No respiratory distress. He has decreased breath sounds in the right lower field and the left lower field. He has no wheezes. He has no rales.   Abdominal: Soft. Normal appearance. He exhibits distension. He exhibits no mass. Bowel sounds are decreased. There is no tenderness. There is no rebound and no guarding. No hernia.   Musculoskeletal: Normal range of motion. He exhibits no edema.   Lymphadenopathy:     He has no cervical adenopathy.   Neurological: He is alert. He is not disoriented.   Oriented to person and place today   Skin: Skin is warm and dry. He is not diaphoretic.   Old blister/ wart on right plantar surface of the foot near the big toe   Psychiatric: He has a normal mood and affect. His behavior is normal. His affect is not labile. His speech is delayed. Cognition and memory are not impaired.   Nursing note and vitals reviewed.      Significant Labs:   CBC:     Recent Labs  Lab 10/13/17  0543 10/14/17  0445   WBC 15.92* 15.59*   HGB 7.4* 7.1*   HCT 20.6* 19.9*    167     CMP:     Recent Labs  Lab 10/13/17  0543 10/14/17  0445   * 123*   K 3.8 3.9   CL 99 97   CO2 14* 15*   * 130*   BUN 53* 66*   CREATININE 1.9* 2.0*   CALCIUM 8.5* 8.3*   PROT 5.6* 5.5*   ALBUMIN 2.8* 2.5*   BILITOT 31.7* 31.3*   ALKPHOS 124 118   * 118*   ALT 64* 60*   ANIONGAP 13 11   EGFRNONAA 46.9* 44.1*      Coagulation:     Recent Labs  Lab 10/14/17  0445   INR 1.6*     All pertinent labs within the past 24 hours have been reviewed.    Significant Imaging: CXR: I have reviewed all pertinent results/findings within the past 24 hours and my personal findings are:  NG in appropriate position

## 2017-10-14 NOTE — SUBJECTIVE & OBJECTIVE
Interval History: Reports he is the same today.  Still reports decreased urine output,  Creatine rise to 2.0, BUN 66, worsening hyponatremia.  No new complaints.      Review of patient's allergies indicates:   Allergen Reactions    Bactrim [sulfamethoxazole-trimethoprim] Other (See Comments)     Mookie Trell Syndrome     Current Facility-Administered Medications   Medication Frequency    0.9%  NaCl infusion (for blood administration) Q24H PRN    albumin human 25% bottle 25 g TID    Amino acid 4.25% - dextrose 10% (CLINIMIX-E) solution with additives (1L provides 42.5 gm AA, 100 gm CHO (340 kcal/L dextrose), Na 35, K 30, Mg 5, Ca 4.5, Acetate 70, Cl 39, Phos 15) Continuous    ampicillin-sulbactam 3 g in sodium chloride 0.9 % 100 mL IVPB (ready to mix system) Q6H    fluconazole tablet 200 mg Daily    furosemide injection 80 mg BID    hepatitis B virus vacc.rec(PF) injection 40 mcg vaccine x 1 dose    lactulose 20 gram/30 mL solution Soln 20 g TID    octreotide injection 100 mcg Q8H    ondansetron injection 4 mg Q4H PRN    pantoprazole EC tablet 40 mg BID AC    promethazine (PHENERGAN) 12.5 mg in dextrose 5 % 50 mL IVPB Q6H PRN    rifAXIMin tablet 550 mg BID    sodium bicarbonate tablet 1,300 mg TID    sucralfate 100 mg/mL suspension 1 g Q6H    tramadol tablet 50 mg Q4H PRN    vancomycin (VANCOCIN) 1,500 mg in dextrose 5 % 250 mL IVPB Q24H       Objective:     Vital Signs (Most Recent):  Temp: 99 °F (37.2 °C) (10/14/17 0746)  Pulse: (!) 112 (10/14/17 0746)  Resp: (!) 22 (10/14/17 0746)  BP: (!) 115/44 (10/14/17 0746)  SpO2: 96 % (10/14/17 0746)  O2 Device (Oxygen Therapy): room air (10/14/17 0746) Vital Signs (24h Range):  Temp:  [99 °F (37.2 °C)-100.2 °F (37.9 °C)] 99 °F (37.2 °C)  Pulse:  [110-115] 112  Resp:  [16-22] 22  SpO2:  [95 %-100 %] 96 %  BP: (115-129)/(44-59) 115/44     Weight: (!) 205.8 kg (453 lb 11.3 oz) (10/14/17 0300)  Body mass index is 71.06 kg/m².  Body surface area is 3.12  meters squared.    I/O last 3 completed shifts:  In: -   Out: 380 [Urine:380]    Physical Exam   Constitutional: He appears well-developed. He is cooperative.  Non-toxic appearance. No distress.   HENT:   Head: Normocephalic and atraumatic.   Mouth/Throat: Oropharynx is clear and moist.   Eyes: EOM are normal. Pupils are equal, round, and reactive to light. Scleral icterus is present.   Neck: Normal range of motion. Neck supple.   Cardiovascular: Regular rhythm, normal heart sounds and normal pulses.  Tachycardia present.    No murmur heard.  Sinus tachycardia   Pulmonary/Chest: Effort normal. No respiratory distress. He has decreased breath sounds in the right lower field and the left lower field. He has no wheezes. He has no rales.   Abdominal: Soft. Normal appearance. He exhibits distension. He exhibits no mass. Bowel sounds are decreased. There is no tenderness. There is no rebound and no guarding. No hernia.   Musculoskeletal: Normal range of motion. He exhibits edema (+3 Worst on right ).   Lymphadenopathy:     He has no cervical adenopathy.   Neurological: He is alert. He is not disoriented.   Oriented to person and place today, some difficulty with time.    Skin: Skin is warm and dry.   Psychiatric: He has a normal mood and affect. His behavior is normal. His affect is not labile. His speech is delayed. Cognition and memory are not impaired.   Nursing note and vitals reviewed.      Significant Labs:  CMP:     Recent Labs  Lab 10/14/17  0445   *   CALCIUM 8.3*   ALBUMIN 2.5*   PROT 5.5*   *   K 3.9   CO2 15*   CL 97   BUN 66*   CREATININE 2.0*   ALKPHOS 118   ALT 60*   *   BILITOT 31.3*     All labs within the past 24 hours have been reviewed.     Significant Imaging:  Labs: Reviewed  X-Ray: Reviewed 10/12 Bilateral multifocal airspace consolidation

## 2017-10-14 NOTE — PLAN OF CARE
Problem: Physical Therapy Goal  Goal: Physical Therapy Goal  Goals to be met by: 10/16/2017    Patient will increase functional independence with mobility by performin. Supine to sit with MInimal Assistance Met 10/9/17   1A. supine to sit with SBA- Met   1B. Mod I   2. Sit to supine with MInimal Assistance - GOAL MET  3. Sit to stand transfer with Minimal Assistance - met 10/5   3A: Sit to stand transfer with SBA using LRAD or no AD. Met 10/11/17  4. Bed to chair transfer with Moderate Assistance using LRAD or no AD.- Met   4A. SBA using LRAD  5. Gait  x 20 feet with Moderate Assistance using LRAD or no Ad. Met 10/9/17   5A: Gait x150 feet with SBA using LRAD or no AD. Met 10/11/2017 with RW  6. Ascend/descend 4 stair with bilateral Handrails Moderate Assistance using LRAD or no AD.  7. Stand for 8 minutes with Minimal Assistance using LRAD or no AD. - GOAL MET  8. Lower extremity exercise program x20 reps per handout, with independence         Outcome: Ongoing (interventions implemented as appropriate)  Pt has achieved 5/8 goals.

## 2017-10-14 NOTE — PROGRESS NOTES
[facility]  Nephrology  Progress Note    Patient Name: Jhonny Diana  MRN: [id number]  Admission Date: 9/27/2017  Hospital Length of Stay: 17 days  Attending Provider: Armand Carrasco MD   Primary Care Physician: Provider Notinsystem  Principal Problem:Alcoholic hepatitis with ascites    Subjective:     HPI: Jhonny Diana is a 28 yo male with pMHX of childhood asthma and alcohol abuse who was was transferred to [facility] for hepatology evaluation for worsening liver functions.  Information was obtained from family at bedside and review of outside records, given that patient is obtunded.  According to mother at bedside, patient has had multiple admissions to the hospital over the past 6 months for recurrent abdominal pain.  She reports that he has been been seen the ED and has been diagnosed with diverticulitis, UTIs, gastroenteritis, and cholecystitis.  He was never admitted to the hospital and was always discharged.  On 09/06, patient presented to OSH for worsening abdominal pain and distension.  He was diagnosed at that time with ETOH hepatitis.  He was treated with steroids, ceftriaxone for SBP prophylaxis, and diuresed at that time for volume management.  He was discharged home on the 15th, after clearance from GI,a nd was instructed to follow-up with his PCP 1 week after discharge.  He presented to the hospital on 09/25 for worsening abdominal pain and distension with 20 kg weight gain.  He was transferred to [facility] for further care and Nephrology was consulted for LAURA.    On review of outside records, patient Scr on admission was 1.5 (with baseline at 1.0).  According to the family at the bedside, he was not urinating well at the OSH, and not responding to diuretics (spirionolactone/lasix combo).  His mother reports that patient continued to c/o abdominal and inability to urinate, but no chicas was placed until transfer to [facility] yesterday.  Record reviews shows relative hypotension with BPs in the low 90's.   His mother reports that the nursing staff was administrating his narcotic regimen based on his blood pressures, and at times, medication was held due to hypotension.  Since admission to hospital, his UOP has improved, responding well in Lasix 40 IV (770 ml since admission, 700 ml so far this shift at time of consultation).  Admit labs revealed a SCr of 2.6, this morning improved down to 2.4.  Bilirubin remains elevated, >14.  Abdomen remains with tight distension, unable to perform paracentesis, as no significant fluid pocket found.  Mother reports patient did not have para's done at OSH.    Labs on the morning of 09/27 with bicarb of 15.  ABG with alkalemia and primary respiratory alkalosis and anion gap acidosis.     No new subjective & objective note has been filed under this hospital service since the last note was generated.    Assessment/Plan:     LAURA (acute kidney injury)    - Given history feel LAURA most likely multifactorial , Hyperbilirubinemia, prerenal/ATN secondary to large volume shifts during paracentesis (no albumin on days of para), aggravated by anemia (Hgb noted to be 7.3 from 11 on admission)  Less likely would be an AIN 2/2 to restarting of Protonix on 10/09   - Feel HRS is unlikely at this point with urine sediment and normal pressures    - Urine sediment show small muddy cast  - Obtain Urine lytes (urea, sodium and creatine), and wrights stain  - Will pursue supportive care for now  - Start albumin 25g Q8, and Lasix 80 IV BID  - Strict Ins and Outs, Avoid nephrotic agents.            Hyponatremia    - Likely Hypervolemic Hyponatremia in setting of liver failure   - Likely 2/2 ADH secretion from decreased renal perfusion due to tense ascites.  - Agree with water restriction, Will diuresis with lasix which should improve sodium  - Monitor BMP and for signs of AMS            Case discussed with Staff, attestation to follow  Thank you for your consult. I will follow-up with patient. Please contact  us if you have any additional questions.    Anoop Nina MD  Nephrology  [facility]       I have reviewed and concur with the resident's history, physical, assessment, and plan. I have personally interviewed and examined the patient at bedside. See below addendum for my evaluation and additional findings

## 2017-10-14 NOTE — ASSESSMENT & PLAN NOTE
· Mentation improving  · Continue Lactulose and Rifaxmin    10/10 - mentation AAO times 4; only one BM today; will schedule lactulose today    10/14- titrate down lactulose to only have 3 to 4 BMS daily

## 2017-10-14 NOTE — ASSESSMENT & PLAN NOTE
27 year old male with a history of alcohol abuse transferred from [facility] for higher level of care. On admission patient came in with severe AH. Although patients mental status remains clear sodium and renal function are worsening. Ultimate cure is liver transplant yet our current recommendations are listed below.     Recommendations:  - Continue high protein diet  - Continue Lactulose (dose decreased) and Rifaximin  - Continue PPI and Carafate   - Restart Octreotide   - Start Lasix and Albumin  - Patient approved but now pending financial approval

## 2017-10-14 NOTE — PROGRESS NOTES
Notified primary team of pts hyponatremia, low BP (MAP 61), and low H&H (7.1/19.9).  No new orders at this time, will continue monitor.

## 2017-10-14 NOTE — SUBJECTIVE & OBJECTIVE
Interval History:   Patient mentating well but did complain of multiple BM overnight.    Current Facility-Administered Medications   Medication    0.9%  NaCl infusion (for blood administration)    albumin human 25% bottle 25 g    Amino acid 4.25% - dextrose 10% (CLINIMIX-E) solution with additives (1L provides 42.5 gm AA, 100 gm CHO (340 kcal/L dextrose), Na 35, K 30, Mg 5, Ca 4.5, Acetate 70, Cl 39, Phos 15)    fluconazole tablet 200 mg    furosemide injection 80 mg    hepatitis B virus vacc.rec(PF) injection 40 mcg    lactulose 20 gram/30 mL solution Soln 20 g    octreotide injection 100 mcg    ondansetron injection 4 mg    pantoprazole EC tablet 40 mg    promethazine (PHENERGAN) 12.5 mg in dextrose 5 % 50 mL IVPB    rifAXIMin tablet 550 mg    sodium bicarbonate tablet 1,300 mg    sucralfate 100 mg/mL suspension 1 g    tramadol tablet 50 mg       Objective:     Vital Signs (Most Recent):  Temp: 99.4 °F (37.4 °C) (10/14/17 1407)  Pulse: (!) 113 (10/14/17 1500)  Resp: (!) 22 (10/14/17 1407)  BP: 129/63 (10/14/17 1407)  SpO2: 96 % (10/14/17 1407) Vital Signs (24h Range):  Temp:  [98.8 °F (37.1 °C)-99.8 °F (37.7 °C)] 99.4 °F (37.4 °C)  Pulse:  [109-115] 113  Resp:  [18-22] 22  SpO2:  [96 %-100 %] 96 %  BP: (115-134)/(44-63) 129/63     Weight: (!) 205.8 kg (453 lb 11.3 oz) (10/14/17 0300)  Body mass index is 71.06 kg/m².    Physical Exam   Constitutional: He is oriented to person, place, and time. No distress.   HENT:   Head: Normocephalic.   Eyes: Pupils are equal, round, and reactive to light. Scleral icterus is present.   Neck: Normal range of motion.   Cardiovascular:   Sinus tachycardia   Pulmonary/Chest:   Decreased breath sounds bilaterally   Abdominal: Bowel sounds are normal. He exhibits distension. He exhibits no mass. There is tenderness. There is no rebound and no guarding. No hernia.   Musculoskeletal: Normal range of motion. He exhibits edema.   Neurological: He is alert and oriented to  person, place, and time.   Skin: He is not diaphoretic.       MELD-Na score: 35 at 10/14/2017  4:45 AM  MELD score: 31 at 10/14/2017  4:45 AM  Calculated from:  Serum Creatinine: 2.0 mg/dL at 10/14/2017  4:45 AM  Serum Sodium: 123 mmol/L (Rounded to 125) at 10/14/2017  4:45 AM  Total Bilirubin: 31.3 mg/dL at 10/14/2017  4:45 AM  INR(ratio): 1.6 at 10/14/2017  4:45 AM  Age: 27 years    Significant Labs:  CBC:   Recent Labs  Lab 10/14/17  0445   WBC 15.59*   RBC 1.84*   HGB 7.1*   HCT 19.9*        CMP:   Recent Labs  Lab 10/14/17  0445   *   CALCIUM 8.3*   ALBUMIN 2.5*   PROT 5.5*   *   K 3.9   CO2 15*   CL 97   BUN 66*   CREATININE 2.0*   ALKPHOS 118   ALT 60*   *   BILITOT 31.3*     Coagulation:   Recent Labs  Lab 10/14/17  0445   INR 1.6*       Significant Imaging:  No recent imaging studies to review

## 2017-10-15 LAB
ALBUMIN SERPL BCP-MCNC: 3.1 G/DL
ALP SERPL-CCNC: 107 U/L
ALT SERPL W/O P-5'-P-CCNC: 55 U/L
ANION GAP SERPL CALC-SCNC: 14 MMOL/L
ANION GAP SERPL CALC-SCNC: 15 MMOL/L
ANISOCYTOSIS BLD QL SMEAR: SLIGHT
AST SERPL-CCNC: 94 U/L
BASOPHILS # BLD AUTO: 0.04 K/UL
BASOPHILS NFR BLD: 0.2 %
BILIRUB SERPL-MCNC: 29 MG/DL
BUN SERPL-MCNC: 76 MG/DL
BUN SERPL-MCNC: 80 MG/DL
BURR CELLS BLD QL SMEAR: ABNORMAL
CALCIUM SERPL-MCNC: 8.5 MG/DL
CALCIUM SERPL-MCNC: 9.1 MG/DL
CHLORIDE SERPL-SCNC: 94 MMOL/L
CHLORIDE SERPL-SCNC: 95 MMOL/L
CO2 SERPL-SCNC: 13 MMOL/L
CO2 SERPL-SCNC: 14 MMOL/L
CREAT SERPL-MCNC: 2 MG/DL
CREAT SERPL-MCNC: 2.2 MG/DL
DIFFERENTIAL METHOD: ABNORMAL
EOSINOPHIL # BLD AUTO: 0.1 K/UL
EOSINOPHIL NFR BLD: 0.8 %
ERYTHROCYTE [DISTWIDTH] IN BLOOD BY AUTOMATED COUNT: 18.6 %
EST. GFR  (AFRICAN AMERICAN): 45.4 ML/MIN/1.73 M^2
EST. GFR  (AFRICAN AMERICAN): 51 ML/MIN/1.73 M^2
EST. GFR  (NON AFRICAN AMERICAN): 39.3 ML/MIN/1.73 M^2
EST. GFR  (NON AFRICAN AMERICAN): 44.1 ML/MIN/1.73 M^2
GLUCOSE SERPL-MCNC: 110 MG/DL
GLUCOSE SERPL-MCNC: 138 MG/DL
HCT VFR BLD AUTO: 20.2 %
HGB BLD-MCNC: 7.3 G/DL
INR PPP: 1.6
LYMPHOCYTES # BLD AUTO: 0.8 K/UL
LYMPHOCYTES NFR BLD: 4.9 %
MAGNESIUM SERPL-MCNC: 2 MG/DL
MCH RBC QN AUTO: 36.3 PG
MCHC RBC AUTO-ENTMCNC: 36.1 G/DL
MCV RBC AUTO: 101 FL
MONOCYTES # BLD AUTO: 2 K/UL
MONOCYTES NFR BLD: 12.4 %
NEUTROPHILS # BLD AUTO: 13 K/UL
NEUTROPHILS NFR BLD: 81.7 %
PHOSPHATE SERPL-MCNC: 3.9 MG/DL
PLATELET # BLD AUTO: 145 K/UL
PLATELET BLD QL SMEAR: ABNORMAL
PMV BLD AUTO: 10.7 FL
POIKILOCYTOSIS BLD QL SMEAR: ABNORMAL
POLYCHROMASIA BLD QL SMEAR: ABNORMAL
POTASSIUM SERPL-SCNC: 3.8 MMOL/L
POTASSIUM SERPL-SCNC: 3.8 MMOL/L
PROT SERPL-MCNC: 6 G/DL
PROTHROMBIN TIME: 15.9 SEC
RBC # BLD AUTO: 2.01 M/UL
SODIUM SERPL-SCNC: 122 MMOL/L
SODIUM SERPL-SCNC: 123 MMOL/L
WBC # BLD AUTO: 16.02 K/UL

## 2017-10-15 PROCEDURE — 97535 SELF CARE MNGMENT TRAINING: CPT | Mod: NTX

## 2017-10-15 PROCEDURE — 20600001 HC STEP DOWN PRIVATE ROOM: Mod: NTX

## 2017-10-15 PROCEDURE — 63600175 PHARM REV CODE 636 W HCPCS: Mod: NTX | Performed by: HOSPITALIST

## 2017-10-15 PROCEDURE — 25000003 PHARM REV CODE 250: Mod: NTX | Performed by: INTERNAL MEDICINE

## 2017-10-15 PROCEDURE — 80048 BASIC METABOLIC PNL TOTAL CA: CPT | Mod: NTX

## 2017-10-15 PROCEDURE — 36415 COLL VENOUS BLD VENIPUNCTURE: CPT | Mod: NTX

## 2017-10-15 PROCEDURE — 99231 SBSQ HOSP IP/OBS SF/LOW 25: CPT | Mod: NTX,,, | Performed by: INTERNAL MEDICINE

## 2017-10-15 PROCEDURE — P9047 ALBUMIN (HUMAN), 25%, 50ML: HCPCS | Mod: NTX | Performed by: HOSPITALIST

## 2017-10-15 PROCEDURE — 83735 ASSAY OF MAGNESIUM: CPT | Mod: NTX

## 2017-10-15 PROCEDURE — 25000003 PHARM REV CODE 250: Mod: NTX | Performed by: HOSPITALIST

## 2017-10-15 PROCEDURE — 94760 N-INVAS EAR/PLS OXIMETRY 1: CPT | Mod: NTX

## 2017-10-15 PROCEDURE — 85025 COMPLETE CBC W/AUTO DIFF WBC: CPT | Mod: NTX

## 2017-10-15 PROCEDURE — 99233 SBSQ HOSP IP/OBS HIGH 50: CPT | Mod: NTX,,, | Performed by: HOSPITALIST

## 2017-10-15 PROCEDURE — 80053 COMPREHEN METABOLIC PANEL: CPT | Mod: NTX

## 2017-10-15 PROCEDURE — 97110 THERAPEUTIC EXERCISES: CPT | Mod: NTX

## 2017-10-15 PROCEDURE — 85610 PROTHROMBIN TIME: CPT | Mod: NTX

## 2017-10-15 PROCEDURE — 84100 ASSAY OF PHOSPHORUS: CPT | Mod: NTX

## 2017-10-15 PROCEDURE — 25000003 PHARM REV CODE 250: Mod: NTX | Performed by: STUDENT IN AN ORGANIZED HEALTH CARE EDUCATION/TRAINING PROGRAM

## 2017-10-15 RX ORDER — LACTULOSE 10 G/15ML
30 SOLUTION ORAL EVERY 6 HOURS
Status: DISCONTINUED | OUTPATIENT
Start: 2017-10-15 | End: 2017-10-16

## 2017-10-15 RX ADMIN — FLUCONAZOLE 200 MG: 200 TABLET ORAL at 08:10

## 2017-10-15 RX ADMIN — SUCRALFATE 1 G: 1 SUSPENSION ORAL at 04:10

## 2017-10-15 RX ADMIN — TRAMADOL HYDROCHLORIDE 50 MG: 50 TABLET, COATED ORAL at 02:10

## 2017-10-15 RX ADMIN — SODIUM BICARBONATE 650 MG TABLET 1300 MG: at 04:10

## 2017-10-15 RX ADMIN — RIFAXIMIN 550 MG: 550 TABLET ORAL at 09:10

## 2017-10-15 RX ADMIN — SUCRALFATE 1 G: 1 SUSPENSION ORAL at 06:10

## 2017-10-15 RX ADMIN — ALBUMIN (HUMAN) 25 G: 12.5 SOLUTION INTRAVENOUS at 01:10

## 2017-10-15 RX ADMIN — PANTOPRAZOLE SODIUM 40 MG: 40 TABLET, DELAYED RELEASE ORAL at 04:10

## 2017-10-15 RX ADMIN — TRAMADOL HYDROCHLORIDE 50 MG: 50 TABLET, COATED ORAL at 12:10

## 2017-10-15 RX ADMIN — ALBUMIN (HUMAN) 25 G: 12.5 SOLUTION INTRAVENOUS at 04:10

## 2017-10-15 RX ADMIN — LACTULOSE 30 G: 20 SOLUTION ORAL at 11:10

## 2017-10-15 RX ADMIN — SODIUM BICARBONATE 650 MG TABLET 1300 MG: at 01:10

## 2017-10-15 RX ADMIN — SUCRALFATE 1 G: 1 SUSPENSION ORAL at 11:10

## 2017-10-15 RX ADMIN — OCTREOTIDE ACETATE 100 MCG: 100 INJECTION, SOLUTION INTRAVENOUS; SUBCUTANEOUS at 09:10

## 2017-10-15 RX ADMIN — OCTREOTIDE ACETATE 100 MCG: 100 INJECTION, SOLUTION INTRAVENOUS; SUBCUTANEOUS at 01:10

## 2017-10-15 RX ADMIN — ONDANSETRON 4 MG: 2 INJECTION INTRAMUSCULAR; INTRAVENOUS at 04:10

## 2017-10-15 RX ADMIN — OCTREOTIDE ACETATE 100 MCG: 100 INJECTION, SOLUTION INTRAVENOUS; SUBCUTANEOUS at 04:10

## 2017-10-15 RX ADMIN — LACTULOSE 30 G: 20 SOLUTION ORAL at 06:10

## 2017-10-15 RX ADMIN — SODIUM BICARBONATE 650 MG TABLET 1300 MG: at 09:10

## 2017-10-15 RX ADMIN — RIFAXIMIN 550 MG: 550 TABLET ORAL at 08:10

## 2017-10-15 RX ADMIN — ALBUMIN (HUMAN) 25 G: 12.5 SOLUTION INTRAVENOUS at 09:10

## 2017-10-15 RX ADMIN — LACTULOSE 20 G: 20 SOLUTION ORAL at 04:10

## 2017-10-15 NOTE — PLAN OF CARE
Problem: Patient Care Overview  Goal: Plan of Care Review  Outcome: Ongoing (interventions implemented as appropriate)  Plan of care reviewed with patient. Pt verbalized understanding. Pt AAOX4. Pt free from falls, injuries and trauma.  Pt free from skin breakdown.  Pt VSS and in NAD.  Pt afebrile.  Pt had no complaints of SOB, N/V or CP.  Pts pain moderately controlled with prn pain medications. Pt ambulate in hallway with mother. Adequate UOP this shift. 3 BM this shift. Pt had uneventful evening. Pt in low locked bed with personal items and call light within reach. Fall precautions maintained. Mother remained at bedside.

## 2017-10-15 NOTE — ASSESSMENT & PLAN NOTE
27 year old male with a history of alcohol abuse transferred from [facility] for higher level of care. On admission patient came in with severe AH. Although patients mental status remains clear sodium continues to worsen and renal function remain stable today.      Ultimate cure is liver transplant yet our current recommendations are listed below.     Recommendations:  - Continue high protein diet with caloric count and fluid restriction  - D/C TPN  - Continue Octreotide and D/C Lasix  - Continue Lactulose and Rifaximin  - Continue PPI and Carafate   - Patient approved but now pending financial approval

## 2017-10-15 NOTE — PT/OT/SLP PROGRESS
"Occupational Therapy  Treatment    Jhonny Diana   MRN: [id number]   Admitting Diagnosis: Alcoholic hepatitis with ascites    OT Date of Treatment: 10/15/17   OT Start Time: 1415  OT Stop Time: 1445  OT Total Time (min): 30 min    Billable Minutes:  Self Care/Home Management 15 and Therapeutic Exercise 15    General Precautions: Standard, contact, fall  Orthopedic Precautions: N/A    Subjective:  Communicated with nsg prior to session.  Pt agreeable to therapy.     Pain/Comfort  Pain Rating 1: 0/10    Objective:      Pt found supine in bed.     Functional Mobility:  Bed Mobility:  Supine to Sit: Stand by Assistance    Transfers:   Sit <> Stand Assistance: Stand By Assistance  Sit <> Stand Assistive Device: Rolling Walker  Toilet Transfer Technique: Stand Pivot  Toilet Transfer Assistance: Stand By Assistance  Toilet Transfer Assistive Device: Rolling Walker, bedside commode    Activities of Daily Living:     Grooming Position: Standing  Grooming Level of Assistance: Stand by assistance  Toileting Where Assessed: Bedside commode  Toileting Level of Assistance: Moderate assistance; pt continues to required assist with hygiene.      Pt demo Fair+ sitting balance as he completed B UE/LE AROM exs 10 reps x 6 planes to increase functional ROM/strength/endurance.    AM-PAC 6 CLICK ADL   How much help from another person does this patient currently need?   1 = Unable, Total/Dependent Assistance  2 = A lot, Maximum/Moderate Assistance  3 = A little, Minimum/Contact Guard/Supervision  4 = None, Modified [place]/Independent    Putting on and taking off regular lower body clothing? : 3  Bathing (including washing, rinsing, drying)?: 2  Toileting, which includes using toilet, bedpan, or urinal? : 3  Putting on and taking off regular upper body clothing?: 3  Taking care of personal grooming such as brushing teeth?: 3  Eating meals?: 4  Total Score: 18     AM-PAC Raw Score CMS "G-Code Modifier Level of Impairment Assistance   6 " % Total / Unable   7 - 8 CM 80 - 100% Maximal Assist   9-13 CL 60 - 80% Moderate Assist   14 - 19 CK 40 - 60% Moderate Assist   20 - 22 CJ 20 - 40% Minimal Assist   23 CI 1-20% SBA / CGA   24 CH 0% Independent/ Mod I       Patient left seated EOB. with all lines intact, call button in reach and mother present    ASSESSMENT:  Jhonny Diana is a 27 y.o. male with a medical diagnosis of Alcoholic hepatitis with ascites and tolerated session well. Pt is progressing well at this time. Pt to benefit from cont OT to address stated goals. D/C rec modified this date to reflect functional progress. Pt mother will continued to be caregiver. At this time, pt will be safe to transition to local apartment with HH services and mother to assist as needed. .    Rehab identified problem list/impairments: Rehab identified problem list/impairments: weakness, impaired functional mobilty, gait instability, impaired endurance, impaired balance, impaired self care skills    Rehab potential is good.    Activity tolerance: Good    Discharge recommendations: Discharge Facility/Level Of Care Needs: home with home health         GOALS:    Occupational Therapy Goals        Problem: Occupational Therapy Goal    Goal Priority Disciplines Outcome Interventions   Occupational Therapy Goal     OT, PT/OT Ongoing (interventions implemented as appropriate)    Description:  Goals to be met by: 10/17/2017    Patient will increase functional independence with ADLs by performing:    UE Dressing with Supervision.  LE Dressing with Supervision. MET 10/12  Grooming while seated with Supervision. DC 10/12  Updated: Grooming while standing sink side with Mod (I).  Toileting from bedside commode with Supervision for hygiene and clothing management.  MET 10/11 with satisfactory achievement at toilet  Sitting at edge of bed or bedside chair x30 minutes with Modified [place].  Rolling to Bilateral with Supervision.   Supine to sit with Supervision. MET  10/12  Stand pivot transfers with Supervision.   Toilet transfer to bedside commode with Supervision. DC 10/11  Updated: Toilet t/f with Supervision. MET 10/12                            Plan:  Patient to be seen 4 x/week to address the above listed problems via self-care/home management, therapeutic activities, therapeutic exercises  Plan of Care expires: 11/02/17  Plan of Care reviewed with: patient, mother         JULIETTE Gilbert  10/15/2017

## 2017-10-15 NOTE — SUBJECTIVE & OBJECTIVE
Interval History:   No acute events overnight.    Current Facility-Administered Medications   Medication    0.9%  NaCl infusion (for blood administration)    albumin human 25% bottle 25 g    fluconazole tablet 200 mg    hepatitis B virus vacc.rec(PF) injection 40 mcg    lactulose 20 gram/30 mL solution Soln 30 g    octreotide injection 100 mcg    ondansetron injection 4 mg    pantoprazole EC tablet 40 mg    promethazine (PHENERGAN) 12.5 mg in dextrose 5 % 50 mL IVPB    rifAXIMin tablet 550 mg    sodium bicarbonate tablet 1,300 mg    sucralfate 100 mg/mL suspension 1 g    tramadol tablet 50 mg       Objective:     Vital Signs (Most Recent):  Temp: 98.9 °F (37.2 °C) (10/15/17 0747)  Pulse: 106 (10/15/17 1040)  Resp: (!) 22 (10/15/17 0747)  BP: (!) 135/58 (10/15/17 0747)  SpO2: 98 % (10/15/17 0747) Vital Signs (24h Range):  Temp:  [98 °F (36.7 °C)-99.5 °F (37.5 °C)] 98.9 °F (37.2 °C)  Pulse:  [105-113] 106  Resp:  [18-22] 22  SpO2:  [95 %-100 %] 98 %  BP: (116-135)/(52-63) 135/58     Weight: (!) 205.8 kg (453 lb 11.3 oz) (10/15/17 0300)  Body mass index is 71.06 kg/m².    Physical Exam   Constitutional: No distress.   HENT:   Head: Normocephalic.   Eyes: Pupils are equal, round, and reactive to light. Scleral icterus is present.   Neck: Normal range of motion.   Cardiovascular:   Sinus tachycardia     Pulmonary/Chest: Effort normal and breath sounds normal. No respiratory distress. He has no wheezes. He has no rales. He exhibits no tenderness.   Abdominal: Bowel sounds are normal. He exhibits distension. He exhibits no mass. There is no tenderness. There is no rebound and no guarding. No hernia.   Musculoskeletal: Normal range of motion. He exhibits edema.   Skin: He is not diaphoretic.       MELD-Na score: 35 at 10/15/2017  4:00 AM  MELD score: 32 at 10/15/2017  4:00 AM  Calculated from:  Serum Creatinine: 2.2 mg/dL at 10/15/2017  4:00 AM  Serum Sodium: 122 mmol/L (Rounded to 125) at 10/15/2017  4:00  AM  Total Bilirubin: 29.0 mg/dL at 10/15/2017  4:00 AM  INR(ratio): 1.6 at 10/15/2017  4:00 AM  Age: 27 years    Significant Labs:  CBC:   Recent Labs  Lab 10/15/17  0400   WBC 16.02*   RBC 2.01*   HGB 7.3*   HCT 20.2*   *     CMP:   Recent Labs  Lab 10/15/17  0400   *   CALCIUM 8.5*   ALBUMIN 3.1*   PROT 6.0   *   K 3.8   CO2 14*   CL 94*   BUN 76*   CREATININE 2.2*   ALKPHOS 107   ALT 55*   AST 94*   BILITOT 29.0*     Coagulation:   Recent Labs  Lab 10/15/17  0400   INR 1.6*       Significant Imaging:  X-Ray: Reviewed

## 2017-10-15 NOTE — ASSESSMENT & PLAN NOTE
Patient evaluated today found in no acute distress. Patient with positive ESBL in sputum with bilateral consolidates in CXR. On evaluation patient resting comfortable with no respiratory distress. Has not had interval increase in O2 requirement. No episodes of desaturation or BP instability. WBC with down trending pattern now at 15 previous at 30. Comparing past CXR patient with improvement from findings since treatment for pneumonia. At this time ESBL in sputum believed to be colonization of the airway and not an acute infectious process. Patient recently received immunization shots,  low grade fever can be associated. Would observe at this time and hold from treatment. Will monitor progress of patient and determine if deteriorate or develops respiratory symptoms to treat at that time. Will recommend meropenem as prophylaxis to be used during perioperative period      - No treatment with antibiotics at this time  - will monitor progress

## 2017-10-15 NOTE — PT/OT/SLP PROGRESS
Physical Therapy      Jhonny Diana  MRN: [id number]    Patient not seen today secondary to Patient unwilling to participate. Attempted to treat pt at 508pm, pt was eating dinner.  Will follow-up with pt at the next scheduled tx session.    Tory Amato, PT

## 2017-10-15 NOTE — PLAN OF CARE
Problem: Patient Care Overview  Goal: Plan of Care Review  Outcome: Ongoing (interventions implemented as appropriate)  POC reviewed w/ pt this am to include personal hygiene, possibly dialysis, increasing PO intake, PO lactulose, pain control, and increasing activity.  Pt was able to take a shower today w/ assistance - feels better.  Pt still not having dialysis and line placed per nephro - team following closely.  Pt's appetite remains poor, PPN stopped per primary team.  Pt taking PO lactulose as scheduled.  Pt's pain moderately controlled w/ prn pain medication.  Pt plans to ambulate in hallway this evening w/ assistance.  Pt remains hyponatremic, creatinine 2.2, H&H = 7.3/20.2.  Plan to draw BMP @ 1900 this pm.

## 2017-10-15 NOTE — PROGRESS NOTES
[facility]  Infectious Disease  Progress Note    Patient Name: Jhonny iDana  MRN: [id number]  Admission Date: 9/27/2017  Length of Stay: 17 days  Attending Physician: Armand Carrasco MD  Primary Care Provider: Provider Not[employer]ystem    Isolation Status: Contact  Assessment/Plan:      Leukocytosis    Patient evaluated today found in no acute distress. Patient with positive ESBL in sputum with bilateral consolidates in CXR. On evaluation patient resting comfortable with no respiratory distress. Has not had interval increase in O2 requirement. No episodes of desaturation or BP instability. WBC with down trending pattern now at 15 previous at 30. Comparing past CXR patient with improvement from findings since treatment for pneumonia. At this time ESBL in sputum believed to be colonization of the airway and not an acute infectious process. Patient recently received immunization shots,  low grade fever can be associated. Would observe at this time and hold from treatment. Will monitor progress of patient and determine if deteriorate or develops respiratory symptoms to treat at that time. Will recommend meropenem as prophylaxis to be used during perioperative period      - No treatment with antibiotics at this time  - will monitor progress              Anticipated Disposition: No antibiotics at this time     Thank you for your consult. I will follow-up with patient. Please contact us if you have any additional questions.    Bernarda Mena MD  Infectious Disease  [facility]    Subjective:     Principal Problem:Alcoholic hepatitis with ascites    HPI: Case of 26 y/o male PMHx BA and alcohol abuse until admition who was admitted on 9/27/17. Was transferred from [facility] due to 6-8 weeks progressively worsening fatigue, jaundice, abdominal pain, distension, BLE edema and 20lbs weight gain due to decompensated liver failure secondary to alcoholic cirrhosis recently diagnosed. ID originally  consulted for evaluation of worsening leukocytosis and transplant candidacy. Patient had an extensive infectious work up with no localizing source. Patient received 7 day course of Zosyn for pneumonia. Ascitic fluid not consistent with SBP, negative urine cultures, CXR improved from past. Patient received immunization ordered for transplant evaluation on 10/11/17 and 10/12/13. Had low grade fever of 100.3 and was reconsulted to ID at this time for additional recommendations regarding ESBL in sputum.      Interval History: Patient continue afebrile, on events over night. Patient has no complaints     Review of Systems   Constitutional: Negative for chills, diaphoresis, fatigue and fever.   Respiratory: Negative for cough, chest tightness and shortness of breath.    Gastrointestinal: Positive for abdominal distention. Negative for abdominal pain, nausea and vomiting.   Skin: Negative for rash.     Objective:     Vital Signs (Most Recent):  Temp: 99.5 °F (37.5 °C) (10/14/17 1654)  Pulse: 110 (10/14/17 1654)  Resp: (!) 22 (10/14/17 1654)  BP: (!) 131/52 (10/14/17 1654)  SpO2: 97 % (10/14/17 1654) Vital Signs (24h Range):  Temp:  [98.8 °F (37.1 °C)-99.8 °F (37.7 °C)] 99.5 °F (37.5 °C)  Pulse:  [109-115] 110  Resp:  [18-22] 22  SpO2:  [96 %-100 %] 97 %  BP: (115-134)/(44-63) 131/52     Weight: (!) 205.8 kg (453 lb 11.3 oz)  Body mass index is 71.06 kg/m².    Estimated Creatinine Clearance: 82.5 mL/min (based on SCr of 2.3 mg/dL (H)).    Physical Exam   Constitutional: He is oriented to person, place, and time. He appears well-developed.   HENT:   Head: Normocephalic and atraumatic.   Eyes: EOM are normal. Pupils are equal, round, and reactive to light. Scleral icterus is present.   Neck: Normal range of motion.   Cardiovascular: Normal rate, regular rhythm and normal heart sounds.    Pulmonary/Chest: Effort normal and breath sounds normal.   Abdominal: Soft. Bowel sounds are normal. He exhibits distension. There is no  tenderness. There is no guarding.   Musculoskeletal: Normal range of motion. He exhibits edema.   Neurological: He is alert and oriented to person, place, and time.   Skin: No rash noted.       Significant Labs:   Blood Culture:   Recent Labs  Lab 09/28/17  0030 09/28/17  0044 10/03/17  1015 10/03/17  1035 10/13/17  0934   LABBLOO No growth after 5 days. No growth after 5 days. No growth after 5 days. No growth after 5 days. No Growth to date  No Growth to date     BMP:   Recent Labs  Lab 10/14/17  0445 10/14/17  1622   * 181*   * 123*   K 3.9 4.0   CL 97 95   CO2 15* 16*   BUN 66* 74*   CREATININE 2.0* 2.3*   CALCIUM 8.3* 8.6*   MG 2.1  --      Microbiology Results (last 7 days)     Procedure Component Value Units Date/Time    Blood culture [[id number]] Collected:  10/13/17 0934    Order Status:  Completed Specimen:  Blood Updated:  10/14/17 1412     Blood Culture, Routine No Growth to date     Blood Culture, Routine No Growth to date    Urine culture [[id number]] Collected:  10/13/17 0927    Order Status:  Completed Specimen:  Urine from Urine, Clean Catch Updated:  10/14/17 1400     Urine Culture, Routine Multiple organisms isolated. None in predominance.  Repeat if     Urine Culture, Routine clinically necessary.    Culture, Body Fluid - Bactec [[id number]] Collected:  10/05/17 1436    Order Status:  Completed Specimen:  Body Fluid from Ascites Updated:  10/10/17 1812     Body Fluid Culture, Sterile No growth after 5 days.    Culture, Respiratory with Gram Stain [[id number]]  (Susceptibility) Collected:  10/06/17 1921    Order Status:  Completed Specimen:  Respiratory from Sputum Updated:  10/09/17 1032     Respiratory Culture --     KLEBSIELLA PNEUMONIAE ESBL  Many       Gram Stain (Respiratory) <10 epithelial cells per low power field     Gram Stain (Respiratory) No WBC's     Gram Stain (Respiratory) Few Gram negative rods    Blood culture [[id number]] Collected:  10/03/17 1015    Order Status:   Completed Specimen:  Blood from Peripheral, Hand, Left Updated:  10/08/17 2012     Blood Culture, Routine No growth after 5 days.    Narrative:       Blood cultures x 2 different sites. 4 bottles total. Please  draw cultures before administering antibiotics.    Blood culture [[id number]] Collected:  10/03/17 1035    Order Status:  Completed Specimen:  Blood from Peripheral, Hand, Right Updated:  10/08/17 1412     Blood Culture, Routine No growth after 5 days.    Narrative:       Blood cultures from 2 different sites. 4 bottles total.  Please draw before starting antibiotics.          Significant Imaging: I have reviewed all pertinent imaging results/findings within the past 24 hours.

## 2017-10-15 NOTE — PROGRESS NOTES
"[facility]  Hepatology  Progress Note    Patient Name: Jhonny Diana  MRN: [id number]  Admission Date: 9/27/2017  Hospital Length of Stay: 18 days  Attending Provider: Armand Carrasco MD   Primary Care Physician: Provider Notinsystem  Principal Problem:Alcoholic hepatitis with ascites    Subjective:     Transplant status: No    HPI: 28 yo male with hx of childhood asthma and heavy alcohol abuse (fifth of liquor daily since teenager, last drink 2.5 wks ago per mother) who was transferred to [facility] for higher level of care/liver transplant evaluation.    History obtained per chart review and discussion with mother: Patient initially presented to OSH for a 2 mo hx of progressively worsening abdominal pain with associated fatigue, jaundice, and abdominal distention with ~20# weight gain.     The patient's mother states he began having abdominal pain about 6 months ago.  States he underwent many tests, all of which were inconclusive.  He then began to have abdominal swelling about two months ago and was in&out of the local ER.  He was still actively drinking up until about 2.5 weeks ago according to the mother but she is unsure of this.  At the OSH the patient underwent an EGD which showed g1EV.  He was also diagnosed with "pancreatitis" based on an elevated lipase.  The patient was also placed on prednisone given concerns for alcoholic hepatitis.           Interval History:   No acute events overnight.    Current Facility-Administered Medications   Medication    0.9%  NaCl infusion (for blood administration)    albumin human 25% bottle 25 g    fluconazole tablet 200 mg    hepatitis B virus vacc.rec(PF) injection 40 mcg    lactulose 20 gram/30 mL solution Soln 30 g    octreotide injection 100 mcg    ondansetron injection 4 mg    pantoprazole EC tablet 40 mg    promethazine (PHENERGAN) 12.5 mg in dextrose 5 % 50 mL IVPB    rifAXIMin tablet 550 mg    sodium bicarbonate tablet 1,300 mg    sucralfate " 100 mg/mL suspension 1 g    tramadol tablet 50 mg       Objective:     Vital Signs (Most Recent):  Temp: 98.9 °F (37.2 °C) (10/15/17 0747)  Pulse: 106 (10/15/17 1040)  Resp: (!) 22 (10/15/17 0747)  BP: (!) 135/58 (10/15/17 0747)  SpO2: 98 % (10/15/17 0747) Vital Signs (24h Range):  Temp:  [98 °F (36.7 °C)-99.5 °F (37.5 °C)] 98.9 °F (37.2 °C)  Pulse:  [105-113] 106  Resp:  [18-22] 22  SpO2:  [95 %-100 %] 98 %  BP: (116-135)/(52-63) 135/58     Weight: (!) 205.8 kg (453 lb 11.3 oz) (10/15/17 0300)  Body mass index is 71.06 kg/m².    Physical Exam   Constitutional: No distress.   HENT:   Head: Normocephalic.   Eyes: Pupils are equal, round, and reactive to light. Scleral icterus is present.   Neck: Normal range of motion.   Cardiovascular:   Sinus tachycardia     Pulmonary/Chest: Effort normal and breath sounds normal. No respiratory distress. He has no wheezes. He has no rales. He exhibits no tenderness.   Abdominal: Bowel sounds are normal. He exhibits distension. He exhibits no mass. There is no tenderness. There is no rebound and no guarding. No hernia.   Musculoskeletal: Normal range of motion. He exhibits edema.   Skin: He is not diaphoretic.       MELD-Na score: 35 at 10/15/2017  4:00 AM  MELD score: 32 at 10/15/2017  4:00 AM  Calculated from:  Serum Creatinine: 2.2 mg/dL at 10/15/2017  4:00 AM  Serum Sodium: 122 mmol/L (Rounded to 125) at 10/15/2017  4:00 AM  Total Bilirubin: 29.0 mg/dL at 10/15/2017  4:00 AM  INR(ratio): 1.6 at 10/15/2017  4:00 AM  Age: 27 years    Significant Labs:  CBC:   Recent Labs  Lab 10/15/17  0400   WBC 16.02*   RBC 2.01*   HGB 7.3*   HCT 20.2*   *     CMP:   Recent Labs  Lab 10/15/17  0400   *   CALCIUM 8.5*   ALBUMIN 3.1*   PROT 6.0   *   K 3.8   CO2 14*   CL 94*   BUN 76*   CREATININE 2.2*   ALKPHOS 107   ALT 55*   AST 94*   BILITOT 29.0*     Coagulation:   Recent Labs  Lab 10/15/17  0400   INR 1.6*       Significant Imaging:  X-Ray: Reviewed    Assessment/Plan:      Decompensated hepatic cirrhosis with ascites and HE    27 year old male with a history of alcohol abuse transferred from [facility] for higher level of care. On admission patient came in with severe AH. Although patients mental status remains clear sodium continues to worsen and renal function remain stable today.      Ultimate cure is liver transplant yet our current recommendations are listed below.     Recommendations:  - Continue high protein diet with caloric count and fluid restriction  - D/C TPN  - Continue Octreotide and D/C Lasix  - Continue Lactulose and Rifaximin  - Continue PPI and Carafate   - Patient approved but now pending financial approval              Thank you for your consult. I will follow-up with patient. Please contact us if you have any additional questions.    Divya Booker M.D.  Gastroenterology Fellow, PGY-IV  Pager: [phone]  [facility]

## 2017-10-15 NOTE — PLAN OF CARE
Problem: Occupational Therapy Goal  Goal: Occupational Therapy Goal  Goals to be met by: 10/17/2017    Patient will increase functional independence with ADLs by performing:    UE Dressing with Supervision.  LE Dressing with Supervision. MET 10/12  Grooming while seated with Supervision. DC 10/12  Updated: Grooming while standing sink side with Mod (I).  Toileting from bedside commode with Supervision for hygiene and clothing management.  MET 10/11 with satisfactory achievement at toilet  Sitting at edge of bed or bedside chair x30 minutes with Modified Ritchie.  Rolling to Bilateral with Supervision.   Supine to sit with Supervision. MET 10/12  Stand pivot transfers with Supervision.   Toilet transfer to bedside commode with Supervision. DC 10/11  Updated: Toilet t/f with Supervision. MET 10/12           Goals remain appropriate.

## 2017-10-16 ENCOUNTER — DOCUMENTATION ONLY (OUTPATIENT)
Dept: PHARMACY | Facility: HOSPITAL | Age: 27
End: 2017-10-16

## 2017-10-16 PROBLEM — D62 ACUTE BLOOD LOSS ANEMIA: Status: ACTIVE | Noted: 2017-10-16

## 2017-10-16 LAB
ALBUMIN SERPL BCP-MCNC: 3.3 G/DL
ALP SERPL-CCNC: 107 U/L
ALT SERPL W/O P-5'-P-CCNC: 65 U/L
ANION GAP SERPL CALC-SCNC: 15 MMOL/L
AST SERPL-CCNC: 162 U/L
BASOPHILS # BLD AUTO: 0.03 K/UL
BASOPHILS NFR BLD: 0.2 %
BILIRUB SERPL-MCNC: 31.3 MG/DL
BLD PROD TYP BPU: NORMAL
BLOOD UNIT EXPIRATION DATE: NORMAL
BLOOD UNIT TYPE CODE: 5100
BLOOD UNIT TYPE: NORMAL
BUN SERPL-MCNC: 84 MG/DL
CALCIUM SERPL-MCNC: 8.6 MG/DL
CHLORIDE SERPL-SCNC: 93 MMOL/L
CO2 SERPL-SCNC: 14 MMOL/L
CODING SYSTEM: NORMAL
CREAT SERPL-MCNC: 2.4 MG/DL
DIFFERENTIAL METHOD: ABNORMAL
DISPENSE STATUS: NORMAL
EOSINOPHIL # BLD AUTO: 0.2 K/UL
EOSINOPHIL NFR BLD: 1.1 %
ERYTHROCYTE [DISTWIDTH] IN BLOOD BY AUTOMATED COUNT: 18.9 %
EST. GFR  (AFRICAN AMERICAN): 40.9 ML/MIN/1.73 M^2
EST. GFR  (NON AFRICAN AMERICAN): 35.4 ML/MIN/1.73 M^2
GLUCOSE SERPL-MCNC: 103 MG/DL
HCT VFR BLD AUTO: 18.6 %
HGB BLD-MCNC: 6.9 G/DL
IMM GRANULOCYTES # BLD AUTO: 0.11 K/UL
IMM GRANULOCYTES NFR BLD AUTO: 0.7 %
INR PPP: 1.6
LYMPHOCYTES # BLD AUTO: 1 K/UL
LYMPHOCYTES NFR BLD: 6.2 %
MAGNESIUM SERPL-MCNC: 1.8 MG/DL
MCH RBC QN AUTO: 37.3 PG
MCHC RBC AUTO-ENTMCNC: 37.1 G/DL
MCV RBC AUTO: 101 FL
MONOCYTES # BLD AUTO: 1.8 K/UL
MONOCYTES NFR BLD: 11.1 %
NEUTROPHILS # BLD AUTO: 12.9 K/UL
NEUTROPHILS NFR BLD: 80.7 %
NRBC BLD-RTO: 0 /100 WBC
OSMOLALITY SERPL: 291 MOSM/KG
OSMOLALITY UR: 311 MOSM/KG
PHOSPHATE SERPL-MCNC: 4.2 MG/DL
PLATELET # BLD AUTO: 133 K/UL
PMV BLD AUTO: 11.9 FL
POCT GLUCOSE: 117 MG/DL (ref 70–110)
POCT GLUCOSE: 120 MG/DL (ref 70–110)
POCT GLUCOSE: 125 MG/DL (ref 70–110)
POTASSIUM SERPL-SCNC: 3.5 MMOL/L
PROT SERPL-MCNC: 6.1 G/DL
PROTHROMBIN TIME: 15.9 SEC
RBC # BLD AUTO: 1.85 M/UL
SODIUM SERPL-SCNC: 122 MMOL/L
T-SPOT TB SCREENING TEST: NORMAL
TRANS ERYTHROCYTES VOL PATIENT: NORMAL ML
WBC # BLD AUTO: 15.92 K/UL

## 2017-10-16 PROCEDURE — 83935 ASSAY OF URINE OSMOLALITY: CPT | Mod: NTX

## 2017-10-16 PROCEDURE — P9021 RED BLOOD CELLS UNIT: HCPCS | Mod: NTX

## 2017-10-16 PROCEDURE — 25000003 PHARM REV CODE 250: Mod: NTX | Performed by: HOSPITALIST

## 2017-10-16 PROCEDURE — 83735 ASSAY OF MAGNESIUM: CPT | Mod: NTX

## 2017-10-16 PROCEDURE — 82436 ASSAY OF URINE CHLORIDE: CPT | Mod: NTX

## 2017-10-16 PROCEDURE — 99232 SBSQ HOSP IP/OBS MODERATE 35: CPT | Mod: NTX,,, | Performed by: INTERNAL MEDICINE

## 2017-10-16 PROCEDURE — 36415 COLL VENOUS BLD VENIPUNCTURE: CPT | Mod: NTX

## 2017-10-16 PROCEDURE — 25000003 PHARM REV CODE 250: Mod: NTX | Performed by: INTERNAL MEDICINE

## 2017-10-16 PROCEDURE — 97530 THERAPEUTIC ACTIVITIES: CPT | Mod: NTX

## 2017-10-16 PROCEDURE — 84100 ASSAY OF PHOSPHORUS: CPT | Mod: NTX

## 2017-10-16 PROCEDURE — 85025 COMPLETE CBC W/AUTO DIFF WBC: CPT | Mod: NTX

## 2017-10-16 PROCEDURE — 63600175 PHARM REV CODE 636 W HCPCS: Mod: NTX | Performed by: HOSPITALIST

## 2017-10-16 PROCEDURE — 84133 ASSAY OF URINE POTASSIUM: CPT | Mod: NTX

## 2017-10-16 PROCEDURE — 97116 GAIT TRAINING THERAPY: CPT | Mod: NTX

## 2017-10-16 PROCEDURE — 99233 SBSQ HOSP IP/OBS HIGH 50: CPT | Mod: NTX,,, | Performed by: HOSPITALIST

## 2017-10-16 PROCEDURE — 97110 THERAPEUTIC EXERCISES: CPT | Mod: NTX

## 2017-10-16 PROCEDURE — 80053 COMPREHEN METABOLIC PANEL: CPT | Mod: NTX

## 2017-10-16 PROCEDURE — 97803 MED NUTRITION INDIV SUBSEQ: CPT | Mod: NTX | Performed by: DIETITIAN, REGISTERED

## 2017-10-16 PROCEDURE — 84300 ASSAY OF URINE SODIUM: CPT | Mod: NTX

## 2017-10-16 PROCEDURE — 83930 ASSAY OF BLOOD OSMOLALITY: CPT | Mod: NTX

## 2017-10-16 PROCEDURE — 25000003 PHARM REV CODE 250: Mod: NTX | Performed by: STUDENT IN AN ORGANIZED HEALTH CARE EDUCATION/TRAINING PROGRAM

## 2017-10-16 PROCEDURE — P9047 ALBUMIN (HUMAN), 25%, 50ML: HCPCS | Mod: NTX | Performed by: HOSPITALIST

## 2017-10-16 PROCEDURE — 85610 PROTHROMBIN TIME: CPT | Mod: NTX

## 2017-10-16 PROCEDURE — 20600001 HC STEP DOWN PRIVATE ROOM: Mod: NTX

## 2017-10-16 RX ORDER — LACTULOSE 10 G/15ML
20 SOLUTION ORAL 3 TIMES DAILY
Status: DISCONTINUED | OUTPATIENT
Start: 2017-10-16 | End: 2017-10-16

## 2017-10-16 RX ORDER — HYDROCODONE BITARTRATE AND ACETAMINOPHEN 500; 5 MG/1; MG/1
TABLET ORAL
Status: DISCONTINUED | OUTPATIENT
Start: 2017-10-16 | End: 2017-10-17

## 2017-10-16 RX ORDER — SODIUM BICARBONATE 650 MG/1
1950 TABLET ORAL 3 TIMES DAILY
Status: DISCONTINUED | OUTPATIENT
Start: 2017-10-16 | End: 2017-10-20

## 2017-10-16 RX ORDER — LACTULOSE 10 G/15ML
15 SOLUTION ORAL 3 TIMES DAILY
Status: DISCONTINUED | OUTPATIENT
Start: 2017-10-17 | End: 2017-10-18

## 2017-10-16 RX ADMIN — TRAMADOL HYDROCHLORIDE 50 MG: 50 TABLET, COATED ORAL at 12:10

## 2017-10-16 RX ADMIN — SODIUM BICARBONATE 650 MG TABLET 1950 MG: at 03:10

## 2017-10-16 RX ADMIN — ONDANSETRON 4 MG: 2 INJECTION INTRAMUSCULAR; INTRAVENOUS at 11:10

## 2017-10-16 RX ADMIN — OCTREOTIDE ACETATE 100 MCG: 100 INJECTION, SOLUTION INTRAVENOUS; SUBCUTANEOUS at 09:10

## 2017-10-16 RX ADMIN — OCTREOTIDE ACETATE 100 MCG: 100 INJECTION, SOLUTION INTRAVENOUS; SUBCUTANEOUS at 03:10

## 2017-10-16 RX ADMIN — FLUCONAZOLE 200 MG: 200 TABLET ORAL at 09:10

## 2017-10-16 RX ADMIN — SUCRALFATE 1 G: 1 SUSPENSION ORAL at 06:10

## 2017-10-16 RX ADMIN — SODIUM BICARBONATE 650 MG TABLET 1300 MG: at 05:10

## 2017-10-16 RX ADMIN — RIFAXIMIN 550 MG: 550 TABLET ORAL at 09:10

## 2017-10-16 RX ADMIN — TRAMADOL HYDROCHLORIDE 50 MG: 50 TABLET, COATED ORAL at 05:10

## 2017-10-16 RX ADMIN — PANTOPRAZOLE SODIUM 40 MG: 40 TABLET, DELAYED RELEASE ORAL at 03:10

## 2017-10-16 RX ADMIN — PANTOPRAZOLE SODIUM 40 MG: 40 TABLET, DELAYED RELEASE ORAL at 05:10

## 2017-10-16 RX ADMIN — SUCRALFATE 1 G: 1 SUSPENSION ORAL at 05:10

## 2017-10-16 RX ADMIN — LACTULOSE 30 G: 20 SOLUTION ORAL at 05:10

## 2017-10-16 RX ADMIN — SODIUM BICARBONATE 650 MG TABLET 1950 MG: at 09:10

## 2017-10-16 RX ADMIN — OCTREOTIDE ACETATE 100 MCG: 100 INJECTION, SOLUTION INTRAVENOUS; SUBCUTANEOUS at 05:10

## 2017-10-16 RX ADMIN — SODIUM CHLORIDE TAB 1 GM 1 G: 1 TAB at 09:10

## 2017-10-16 RX ADMIN — SUCRALFATE 1 G: 1 SUSPENSION ORAL at 11:10

## 2017-10-16 RX ADMIN — ALBUMIN (HUMAN) 25 G: 12.5 SOLUTION INTRAVENOUS at 05:10

## 2017-10-16 NOTE — ASSESSMENT & PLAN NOTE
· 10/15- trending down; holding lasix and stopping PPN; cont albumin    10/16- Na 122 today; starting patient on salt tabs; needs to get to 125 for liver transplant

## 2017-10-16 NOTE — PROGRESS NOTES
"[facility] Hospital Medicine  Progress Note    Patient Name: Jhonny Diana  MRN: [id number]  Patient Class: IP- Inpatient   Admission Date: 9/27/2017  Length of Stay: 19 days  Attending Physician: Armand Carrasco MD  Primary Care Provider: Provider [facility] Medicine Team: [facility] HOSP MED A Armand Carrasco MD    Subjective:     Principal Problem:Alcoholic hepatitis with ascites    HPI:  26 yo male with hx of childhood asthma and heavy alcohol abuse (fifth of liquor daily since teenager, last drink 2.5 wks ago per mother) who was transferred to [facility] for higher level of care/liver transplant evaluation. Patient initially presented to OSH for a 2 mo hx of progressively worsening abdominal pain with associated fatigue, jaundice, and abdominal distention with ~20# weight gain. HPI given by patient's mother and OSH records as patient's participation in interview limited by previous administration of pain medication at OSH.      Per mother, patient did not have a fever, chills, dark stools, known sick contacts, nausea, vomiting, diarrhea, confusion, or HA. Reports family hx of alcoholic cirrhosis in father. Pt was recently diagnosed with alcoholic hepatitis during his admission at OSH on 9/6-9/15. During that hospitalization, patient was given ceftriaxone for SBP ppx, prednisone for alcoholic hepatitis, and diuresed. Pt was discharged and instructed to follow up with PCP and AA within 1 wk. After his discharge, patient returned to ED 4 days after for increased weight gain and worsening abdominal pain/jaundice. At OSH, patient was given fluids (for suspected pancreatitis), diuresed and given IV albumin. Two days prior to transfer to [facility], patient had an episode of hematemesis and underwent an EGD on 9/25 that revealed "1 sm varix in distal esophagus, no stigmata of bleeding, gastropathy of portal hypertension, and reflux esophagitis".  No further episodes of hematemesis noted since initial episode. "      Recent labs at OSH: Na 134, CO2 18, BUN 36, Cr 2, Tprot 5.9, albumin 1.9, Tbili 24, alk phos 83, , ALT51. Patient also had lipase 532 and ammonia 118 during his previous hospitalization at OSH. Labwork also notable for negative mitochondrial ab, sm muscle ab, and ceruloplasmin 22. Imaging at OSH revealing abdo U/S (9/20): hepatosplenomegaly, CT abdo w contrast: hepatomegaly with marked fatty infiltration of the liver and mild ascites (9/6). No CDs available for review.    Hospital Course:  Mr. Diana was admitted to the CMICU on 9/27 for decompensated alcoholic hepatitis with high MELD score, severe HE, and LAURA. Hepatology consulted and following to evaluate liver transplant candidacy. Patient not currently a transplant candidate due to recent alcohol use; recommending re-evaluation by addiction psych and social work when more medically stable and able to participate. Pt started on albumin and lasix for LAURA and possibility for HRS. However nephrology felt that LAURA is likely multifactorial: ATN given hypotensive episodes at OSH and toxic given severe hyperbilirubinemia; not truly HRS. Lactulose/rifaximin started for HE and mental status improving slowly. Mr. Diana has had significant ABD pain since admission with ABD distension. CT abdomen was significant for pericolonic fat stranding of right colon; no obstruction. Also with diffuse mesenteric edema. No significant ascites noted, no cholecystitis/cholelithiasis/ductal dilation. US liver with doppler showed appropriate vasculature.  He was stepped down to Hospital Medicine on 10/5.  Paracentesis was done on 10/9 with 3.3L removed.  His diet was advanced and Dietary was consulted to help get adequate caloric intake.    Interval History: patient was accepted for liver transplant and has been approved for listing; will be transferred to LTS tomorrow  - sodium 122, will add salt tabs today; had 6 BMs    Review of Systems   Constitutional: Negative for chills,  fatigue and fever.   HENT: Negative for sore throat and trouble swallowing.    Eyes: Negative for photophobia and visual disturbance.   Respiratory: Negative for cough and shortness of breath.    Cardiovascular: Negative for chest pain, palpitations and leg swelling.   Gastrointestinal: Positive for abdominal distention. Negative for abdominal pain, constipation, diarrhea, nausea and vomiting.   Endocrine: Negative for cold intolerance and heat intolerance.   Genitourinary: Negative for dysuria and frequency.   Musculoskeletal: Negative for arthralgias and myalgias.   Skin: Positive for color change. Negative for rash and wound.   Neurological: Negative for dizziness, syncope, weakness and light-headedness.   Psychiatric/Behavioral: Negative for confusion and hallucinations.   All other systems reviewed and are negative.    Objective:     Vital Signs (Most Recent):  Temp: 97.5 °F (36.4 °C) (10/16/17 1641)  Pulse: 100 (10/16/17 1641)  Resp: 16 (10/16/17 1641)  BP: (!) 120/59 (10/16/17 1641)  SpO2: 96 % (10/16/17 1641) Vital Signs (24h Range):  Temp:  [97.5 °F (36.4 °C)-99.4 °F (37.4 °C)] 97.5 °F (36.4 °C)  Pulse:  [] 100  Resp:  [14-20] 16  SpO2:  [94 %-97 %] 96 %  BP: (107-120)/(54-59) 120/59     Weight: 93.4 kg (205 lb 12.8 oz)  Body mass index is 32.23 kg/m².    Intake/Output Summary (Last 24 hours) at 10/16/17 1850  Last data filed at 10/16/17 1802   Gross per 24 hour   Intake             1360 ml   Output              480 ml   Net              880 ml      Physical Exam   Constitutional: He appears well-developed and well-nourished. He is cooperative.  Non-toxic appearance. No distress.   HENT:   Head: Normocephalic and atraumatic.   Mouth/Throat: Oropharynx is clear and moist.   Eyes: EOM are normal. Pupils are equal, round, and reactive to light. Scleral icterus is present.   Neck: Normal range of motion. Neck supple.   Cardiovascular: Regular rhythm, normal heart sounds and normal pulses.  Tachycardia  present.    No murmur heard.  Sinus tachycardia   Pulmonary/Chest: Effort normal. No respiratory distress. He has decreased breath sounds in the right lower field and the left lower field. He has no wheezes. He has no rales.   Abdominal: Soft. Normal appearance. He exhibits distension. He exhibits no mass. Bowel sounds are decreased. There is no tenderness. There is no rebound and no guarding. No hernia.   Musculoskeletal: Normal range of motion. He exhibits no edema.   Lymphadenopathy:     He has no cervical adenopathy.   Neurological: He is alert. He is not disoriented.   Oriented to person and place today   Skin: Skin is warm and dry. He is not diaphoretic.   Old blister/ wart on right plantar surface of the foot near the big toe   Psychiatric: He has a normal mood and affect. His behavior is normal. His affect is not labile. His speech is delayed. Cognition and memory are not impaired.   Nursing note and vitals reviewed.      Significant Labs:   CBC:     Recent Labs  Lab 10/15/17  0400 10/16/17  0528   WBC 16.02* 15.92*   HGB 7.3* 6.9*   HCT 20.2* 18.6*   * 133*     CMP:     Recent Labs  Lab 10/15/17  0400 10/15/17  1929 10/16/17  0528   * 123* 122*   K 3.8 3.8 3.5   CL 94* 95 93*   CO2 14* 13* 14*   * 110 103   BUN 76* 80* 84*   CREATININE 2.2* 2.0* 2.4*   CALCIUM 8.5* 9.1 8.6*   PROT 6.0  --  6.1   ALBUMIN 3.1*  --  3.3*   BILITOT 29.0*  --  31.3*   ALKPHOS 107  --  107   AST 94*  --  162*   ALT 55*  --  65*   ANIONGAP 14 15 15   EGFRNONAA 39.3* 44.1* 35.4*     Coagulation:     Recent Labs  Lab 10/16/17  0528   INR 1.6*     All pertinent labs within the past 24 hours have been reviewed.    Significant Imaging: CXR: I have reviewed all pertinent results/findings within the past 24 hours and my personal findings are:  NG in appropriate position    Assessment/Plan:      * Alcoholic hepatitis with ascites    · Appreciate Hepatology assistance  · Continue Lactulose and Rifaximin for  HE  · Currently not transplant candidate per Psych and Hep.  Addiction Psych consulted  · IR para 10/9- 7.8 L fluid removed negative for SBP  · Stressed the importance of high calorie diet to prevent NGT insertion again - Boost TID  · Patient agreed to due alcohol rehab if patient was to get a liver transplant; we have initiated inpatient liver transplant evaluation;   · Patient has been accepted for liver transplant and has been approved for listing; transfer to LTS tomorrow     MELD-Na score: 36 at 10/16/2017  5:28 AM  MELD score: 33 at 10/16/2017  5:28 AM  Calculated from:  Serum Creatinine: 2.4 mg/dL at 10/16/2017  5:28 AM  Serum Sodium: 122 mmol/L (Rounded to 125) at 10/16/2017  5:28 AM  Total Bilirubin: 31.3 mg/dL at 10/16/2017  5:28 AM  INR(ratio): 1.6 at 10/16/2017  5:28 AM  Age: 27 years            Acute blood loss anemia    - due to hemolysis; no evidence of overt GI bleeding; will transfuse 1 unit of PRBC today          Ascites due to alcoholic cirrhosis    As above    S/p paracentesis on 10/12 with 1.7 L removed; adding lasix         Severe malnutrition    · SLP consulted and ok for dental soft diet  · Dietary consulted  · Stressed the importance of eating high protein high calorie to prevent NGT re-insertion with TF  · Calorie counts and Boost TID  · Started patient on PPN on 10/11, continue     10/15- stopping PPN today        Coagulopathy    · 2/2 liver disease  · No bleeding; Monitor          Leukocytosis    · WBC remains elevated - stable at 26  · Remains afebrile  · Repeat blood cultures NGTD  · UA negative  · Resp cx ESBL Klebsiella.  ID following.  Will discuss antibiotics        Metabolic acidosis with normal anion gap and bicarbonate losses    · 2/2 diarrhea given lactulose.  Currently on 10g daily with improving BMs  · Still acidotic, will monitor  · Sodium Bicarb TID          Decompensated hepatic cirrhosis with ascites and HE    See alcoholic hepatitis with ascites          Anasarca    · 2/2  liver disease and hypoalbuminemia          Hyponatremia    · 10/15- trending down; holding lasix and stopping PPN; cont albumin    10/16- Na 122 today; starting patient on salt tabs; needs to get to 125 for liver transplant             History of hematemesis    · Pt had an episode of hematemesis at OSH and EGD that revealed 1 small varix, reflux esophagitis, and gastropathy of portal hypertension  · No evidence of bleeding currently and relatively stable hemgolobin  · Continue Pantoprazole BID        Acute renal failure with tubular necrosis    · Appreciate Nephro's help  · Suspect LAURA multifactorial: ischemic ATN given hypotension and injury due to hyperbilirubinemia. Felt less likely to be HRS however have continued albumin with lasix  · Overall, LAURA improving and making adequate urine.  Not measuring amounts as he pulls off condom catheter and is in diapers - but mother reports frequent wet diapers    10/12- renal function worsening, will re-consult nephrology; adding octreotide; strict I/O; decrease UOP    10/14- will transfuse 1 unit of PRBC to help perfusion and add back octreotide and albumin    10/16- Cr worsening today; will transfuse 1 unit of PRBC        Hepatic encephalopathy    · Mentation improving  · Continue Lactulose and Rifaxmin    10/10 - mentation AAO times 4; only one BM today; will schedule lactulose today    10/14- titrate down lactulose to only have 3 to 4 BMS daily          Alcoholism /alcohol abuse    · Reported about a fifth of whiskey daily since teenager  · Appreciate Psych and Hep assistance; currently high risk for liver transplant.  Addiction Psych reconsulted, state prognosis is gaurded however would need ABU if transplant is need urgently which patient has agreed to do  · PETH 174 on admit, 9/27          VTE Risk Mitigation         Ordered     Place sequential compression device  Until discontinued      09/28/17 0025     Medium Risk of VTE  Once      09/27/17 1034               Armand Carrasco MD  Department of Hospital Medicine   [facility]

## 2017-10-16 NOTE — PLAN OF CARE
Problem: Liver Failure, Acute/Chronic (Adult)  Intervention: Optimize/Manage Nutrition  Recommendations    Recommendation/Intervention: Oral intake remains poor. Encouraged patient to consume 5 small meals per day with ONS, can order what he wants at any time.     If pt to receive tx, recommend pre-op nutrition support to improve nutrition status. Isosource at goal rate of 60 mL/hr will meet 100% estimated energy and protein needs. RD following    Goals: Meet >80% of EEN/EPN  Nutrition Goal Status: new  Communication of RD Recs: reviewed with physician

## 2017-10-16 NOTE — ASSESSMENT & PLAN NOTE
- previously studies c/w increased ADH secretion in setting of cirrhosis; surprisingly serum osm was wnl. Will repeat today  - Na down to 122 today. Na must be >125 prior to transplant  - can consider starting salt tablets 1g BID to increase Na levels prior to transplant  - as a last resort, can also consider conivaptan infusion (if no prior h/o varices) to ensure patient receives transplant  - will continue to monitor

## 2017-10-16 NOTE — PT/OT/SLP PROGRESS
"Physical Therapy  Treatment    Jhonny Diana   MRN: [id number]   Admitting Diagnosis: Alcoholic hepatitis with ascites    PT Received On: 10/16/17  PT Start Time: 1404     PT Stop Time: 1457    PT Total Time (min): 53 min       Billable Minutes:  Gait Training 30, Therapeutic Activity 8 and Therapeutic Exercise 15    Treatment Type: Treatment  PT/PTA: PT     PTA Visit Number: 0       General Precautions: Standard, contact, fall  Orthopedic Precautions: N/A   Braces: N/A    Do you have any cultural, spiritual, Restoration conflicts, given your current situation?: none reported     Subjective:  Communicated with nursing prior to session.  "Really, after all I went through with the blood." - referring to pain during blood transfusion    Pain/Comfort  Pain Rating 1: 5/10  Location 1: abdomen  Pain Addressed 1: Nurse notified, Pre-medicate for activity, Distraction    Objective:   Patient found with: peripheral IV, telemetry    Functional Mobility:  Bed Mobility:   Scooting/Bridging: Minimum Assistance  Supine to Sit: Stand by Assistance, Minimum Assistance (With HOB elevated by 35 deg, with inc time to perf, Migdalia for hand placement onto bed rail)  Sit to Supine: Minimum Assistance (To lower trunk, pt perf 2xs)    Transfers:  Sit <> Stand Assistance: Supervision, Minimum Assistance (S: to perf from bed.  Migdalia: to perf from rollinig desk chair, with ed for hand placement)  Sit <> Stand Assistive Device: No Assistive Device  Bed <> Chair Technique: Stand Pivot  Bed <> Chair Assistance: Contact Guard Assistance  Bed <> Chair Assistive Device: No Assistive Device    Gait:   Gait Distance: Pt amb 350' with 2 sitting rest breaks, without episodes of LOB, HHA  Assistance 1: Migdalia  Gait Assistive Device: Hand held assist  Gait Pattern: swing-through gait  Gait Deviation(s): decreased saskia, increased time in double stance, decreased step length, decreased stride length    Stairs:  Pt ascended/descend 4 stair(s) with No Assistive " Device with use of left HRs with Minimal Assistance, non-reciprocally, with inc time to perf sec to anxiety.     Balance:   Static Sit: FAIR+: Able to take MINIMAL challenges from all directions  Dynamic Sit: FAIR+: Maintains balance through MINIMAL excursions of active trunk motion  Static Stand: FAIR: Maintains without assist but unable to take challenges  Dynamic stand: POOR: N/A     Therapeutic Activities and Exercises:  Whiteboard updated  Ankle pumps: 20 reps   SLR: 20 reps each LE perf individually, in supine, with Migdalia to perf  Heel slides: 20 reps each LE perf individually, in supine  Hip abd/add: 20 reps each LE perf individually, in supine  SKTC: alt LEs, 20 reps, in hooklying     AM-PAC 6 CLICK MOBILITY  How much help from another person does this patient currently need?   1 = Unable, Total/Dependent Assistance  2 = A lot, Maximum/Moderate Assistance  3 = A little, Minimum/Contact Guard/Supervision  4 = None, Modified [place]/Independent    Turning over in bed (including adjusting bedclothes, sheets and blankets)?: 3  Sitting down on and standing up from a chair with arms (e.g., wheelchair, bedside commode, etc.): 3  Moving from lying on back to sitting on the side of the bed?: 3  Moving to and from a bed to a chair (including a wheelchair)?: 3  Need to walk in hospital room?: 3  Climbing 3-5 steps with a railing?: 3  Total Score: 18    AM-PAC Raw Score CMS G-Code Modifier Level of Impairment Assistance   6 % Total / Unable   7 - 9 CM 80 - 100% Maximal Assist   10 - 14 CL 60 - 80% Moderate Assist   15 - 19 CK 40 - 60% Moderate Assist   20 - 22 CJ 20 - 40% Minimal Assist   23 CI 1-20% SBA / CGA   24 CH 0% Independent/ Mod I     Patient left supine with all lines intact, call button in reach, nursing notified and mother present.    Assessment:  Jhonny Diana is a 27 y.o. male with a medical diagnosis of Alcoholic hepatitis with ascites.  Pt is very fearful and anxious with mobility.  Stated that  he has always has a fear of stairs.  Required lots of encouragement and prolonged sitting rest break with step-by-step instructions prior to stair amb.  Mother coddles her son, providing assistance too often and not allowing pt to perf functional mobility on his own.  Improvements made in gait, able to amb without use of AD with Migdalia.  Introduction to stair amb, will require reinforcement and practice.  Cont recommendation for pt to receive skilled PT services in the acute setting to improve functional mobility and gait.      Rehab identified problem list/impairments: Rehab identified problem list/impairments: weakness, impaired endurance, impaired cardiopulmonary response to activity, pain, decreased safety awareness, gait instability, impaired balance, impaired functional mobilty, impaired self care skills, edema    Rehab potential is fair.    Activity tolerance: Fair    Discharge recommendations: Discharge Facility/Level Of Care Needs: home health PT     Barriers to discharge: Barriers to Discharge: None    Equipment recommendations: Equipment Needed After Discharge: none     GOALS:    Physical Therapy Goals        Problem: Physical Therapy Goal    Goal Priority Disciplines Outcome Goal Variances Interventions   Physical Therapy Goal     PT/OT, PT Ongoing (interventions implemented as appropriate)     Description:  Goals to be met by: 10/16/2017    Patient will increase functional independence with mobility by performin. Supine to sit with MInimal Assistance Met 10/9/17   1A. supine to sit with SBA- Met   1B. Mod I   2. Sit to supine with MInimal Assistance - GOAL MET  3. Sit to stand transfer with Minimal Assistance - met 10/5   3A: Sit to stand transfer with SBA using LRAD or no AD. Met 10/11/17  4. Bed to chair transfer with Moderate Assistance using LRAD or no AD.- Met   4A. SBA using LRAD  5. Gait  x 20 feet with Moderate Assistance using LRAD or no Ad. Met 10/9/17   5A: Gait x150 feet with SBA using  LRAD or no AD. Met 10/11/2017 with RW  6. Ascend/descend 4 stair with bilateral Handrails Moderate Assistance using LRAD or no AD.- GOAL MET              6A. CGA  7. Stand for 8 minutes with Minimal Assistance using LRAD or no AD. - GOAL MET  8. Lower extremity exercise program x20 reps per handout, with independence                            PLAN:    Patient to be seen 4 x/week  to address the above listed problems via gait training, therapeutic activities, therapeutic exercises, neuromuscular re-education  Plan of Care expires: 11/02/17  Plan of Care reviewed with: patient, mother         Tory Amato, PT  10/16/2017

## 2017-10-16 NOTE — ASSESSMENT & PLAN NOTE
· Appreciate Nephro's help  · Suspect LAURA multifactorial: ischemic ATN given hypotension and injury due to hyperbilirubinemia. Felt less likely to be HRS however have continued albumin with lasix  · Overall, LAURA improving and making adequate urine.  Not measuring amounts as he pulls off condom catheter and is in diapers - but mother reports frequent wet diapers    10/12- renal function worsening, will re-consult nephrology; adding octreotide; strict I/O; decrease UOP    10/14- will transfuse 1 unit of PRBC to help perfusion and add back octreotide and albumin    10/16- Cr worsening today; will transfuse 1 unit of PRBC

## 2017-10-16 NOTE — ASSESSMENT & PLAN NOTE
27 year old male with a history of alcohol abuse transferred from [facility] for higher level of care. On admission patient came in with severe AH.     He is approved for liver transplant.  He was given financial approval.  His listing as active is pending improvement in hypoNa, goal >125.      Recommendations:  - give 1 unit prbcs today  - hold diuretics  - octreotide ; hold albumin currently given high albumin level  - fluid restrict for HypoNa  - start salt tabs per nephro recs  - nephrology following, appreciate recs  - Continue Lactulose and Rifaximin  - Continue PPI     - patient APPROVED for transplant, listing is PENDING correction of hypoNa

## 2017-10-16 NOTE — ASSESSMENT & PLAN NOTE
Nutrition Problem  Inadequate energy intake    Related to (etiology):   TF d/c with poor oral intake    Signs and Symptoms (as evidenced by):  Meeting 25% EEN and EPN    Interventions/Recommendations (treatment strategy):  See recs    Nutrition Diagnosis Status:   Continues

## 2017-10-16 NOTE — PROGRESS NOTES
"[facility]  Hepatology  Progress Note    Patient Name: Jhonny Diana  MRN: [id number]  Admission Date: 9/27/2017  Hospital Length of Stay: 19 days  Attending Provider: Armand Carrasco MD   Primary Care Physician: Provider Notinsystem  Principal Problem:Alcoholic hepatitis with ascites    Subjective:     Transplant status: Pre-transplant    HPI: 26 yo male with hx of childhood asthma and heavy alcohol abuse (fifth of liquor daily since teenager, last drink 2.5 wks ago per mother) who was transferred to [facility] for higher level of care/liver transplant evaluation.    History obtained per chart review and discussion with mother: Patient initially presented to OSH for a 2 mo hx of progressively worsening abdominal pain with associated fatigue, jaundice, and abdominal distention with ~20# weight gain.     The patient's mother states he began having abdominal pain about 6 months ago.  States he underwent many tests, all of which were inconclusive.  He then began to have abdominal swelling about two months ago and was in&out of the local ER.  He was still actively drinking up until about 2.5 weeks ago according to the mother but she is unsure of this.  At the OSH the patient underwent an EGD which showed g1EV.  He was also diagnosed with "pancreatitis" based on an elevated lipase.  The patient was also placed on prednisone given concerns for alcoholic hepatitis.           Interval History:     Doing well this morning.  Happy to hear he was financially approved.  Wants to know what he needs to do to improve.    Interested in his plan for low sodium      Current Facility-Administered Medications   Medication    0.9%  NaCl infusion (for blood administration)    0.9%  NaCl infusion (for blood administration)    fluconazole tablet 200 mg    hepatitis B virus vacc.rec(PF) injection 40 mcg    [START ON 10/17/2017] lactulose 20 gram/30 mL solution Soln 15 g    octreotide injection 100 mcg    ondansetron injection " 4 mg    pantoprazole EC tablet 40 mg    promethazine (PHENERGAN) 12.5 mg in dextrose 5 % 50 mL IVPB    rifAXIMin tablet 550 mg    sodium bicarbonate tablet 1,950 mg    sucralfate 100 mg/mL suspension 1 g    tramadol tablet 50 mg       Objective:     Vital Signs (Most Recent):  Temp: 97.5 °F (36.4 °C) (10/16/17 1641)  Pulse: 100 (10/16/17 1641)  Resp: 16 (10/16/17 1641)  BP: (!) 120/59 (10/16/17 1641)  SpO2: 96 % (10/16/17 1641) Vital Signs (24h Range):  Temp:  [97.5 °F (36.4 °C)-99.4 °F (37.4 °C)] 97.5 °F (36.4 °C)  Pulse:  [] 100  Resp:  [14-20] 16  SpO2:  [94 %-97 %] 96 %  BP: (107-120)/(54-59) 120/59     Weight: 93.4 kg (205 lb 12.8 oz) (10/15/17 0300)  Body mass index is 32.23 kg/m².    Physical Exam   Constitutional: He is oriented to person, place, and time. No distress.   HENT:   Head: Normocephalic.   Eyes: Scleral icterus is present.   Neck: Normal range of motion.   Cardiovascular: Normal rate.         Pulmonary/Chest: Effort normal and breath sounds normal. He has no rales.   Abdominal: Bowel sounds are normal. He exhibits distension. There is no rebound.   Musculoskeletal: He exhibits edema.   Neurological: He is alert and oriented to person, place, and time.   Skin: He is not diaphoretic.   jaundiced   Psychiatric: He has a normal mood and affect. His behavior is normal.       MELD-Na score: 36 at 10/16/2017  5:28 AM  MELD score: 33 at 10/16/2017  5:28 AM  Calculated from:  Serum Creatinine: 2.4 mg/dL at 10/16/2017  5:28 AM  Serum Sodium: 122 mmol/L (Rounded to 125) at 10/16/2017  5:28 AM  Total Bilirubin: 31.3 mg/dL at 10/16/2017  5:28 AM  INR(ratio): 1.6 at 10/16/2017  5:28 AM  Age: 27 years    Significant Labs:  CBC:   Recent Labs  Lab 10/16/17  0528   WBC 15.92*   RBC 1.85*   HGB 6.9*   HCT 18.6*   *     BMP:   Recent Labs  Lab 10/16/17  0528      *   K 3.5   CL 93*   CO2 14*   BUN 84*   CREATININE 2.4*   CALCIUM 8.6*     CMP:   Recent Labs  Lab 10/16/17  0528   GLU  103   CALCIUM 8.6*   ALBUMIN 3.3*   PROT 6.1   *   K 3.5   CO2 14*   CL 93*   BUN 84*   CREATININE 2.4*   ALKPHOS 107   ALT 65*   *   BILITOT 31.3*     Coagulation:   Recent Labs  Lab 10/16/17  0528   INR 1.6*       Significant Imaging:  Labs: Reviewed    Assessment/Plan:     Decompensated hepatic cirrhosis with ascites and HE    27 year old male with a history of alcohol abuse transferred from [facility] for higher level of care. On admission patient came in with severe AH.     He is approved for liver transplant.  He was given financial approval.  His listing as active is pending improvement in hypoNa, goal >125.      Recommendations:  - give 1 unit prbcs today  - hold diuretics  - octreotide ; hold albumin currently given high albumin level  - fluid restrict for HypoNa  - start salt tabs per nephro recs  - nephrology following, appreciate recs  - Continue Lactulose and Rifaximin  - Continue PPI     - patient APPROVED for transplant, listing is PENDING correction of hypoNa          Hyponatremia    Hyponatremia mildly improving this morning after he was given albumin yesterday.    Recommendations:  - Continue to restrict to 1L  - hold diuretics and hold albumin              Thank you for your consult. I will follow-up with patient. Please contact us if you have any additional questions.    Jose Storey MD  Hepatology  [facility]

## 2017-10-16 NOTE — SUBJECTIVE & OBJECTIVE
Interval History:   Lasix stopped over weekend. No events overnight. Hopefully being listed for liver transplant today. Reports increased BMs today; 5 so far this morning. Denies SOB. Good UOP. Having some bleeding from his lip. Blood transfusion today.   UOP 200cc + 2x.     Review of patient's allergies indicates:   Allergen Reactions    Bactrim [sulfamethoxazole-trimethoprim] Other (See Comments)     Mookie Trell Syndrome     Current Facility-Administered Medications   Medication Frequency    0.9%  NaCl infusion (for blood administration) Q24H PRN    0.9%  NaCl infusion (for blood administration) Q24H PRN    fluconazole tablet 200 mg Daily    hepatitis B virus vacc.rec(PF) injection 40 mcg vaccine x 1 dose    [START ON 10/17/2017] lactulose 20 gram/30 mL solution Soln 15 g TID    octreotide injection 100 mcg Q8H    ondansetron injection 4 mg Q4H PRN    pantoprazole EC tablet 40 mg BID AC    promethazine (PHENERGAN) 12.5 mg in dextrose 5 % 50 mL IVPB Q6H PRN    rifAXIMin tablet 550 mg BID    sodium bicarbonate tablet 1,300 mg TID    sucralfate 100 mg/mL suspension 1 g Q6H    tramadol tablet 50 mg Q4H PRN       Objective:     Vital Signs (Most Recent):  Temp: 98.5 °F (36.9 °C) (10/16/17 1153)  Pulse: 102 (10/16/17 1153)  Resp: 16 (10/16/17 1153)  BP: (!) 108/59 (10/16/17 1153)  SpO2: (!) 94 % (10/16/17 1153)  O2 Device (Oxygen Therapy): room air (10/16/17 1153) Vital Signs (24h Range):  Temp:  [98.3 °F (36.8 °C)-99.4 °F (37.4 °C)] 98.5 °F (36.9 °C)  Pulse:  [] 102  Resp:  [14-22] 16  SpO2:  [94 %-96 %] 94 %  BP: (107-122)/(57-61) 108/59     Weight: 93.4 kg (205 lb 12.8 oz) (10/15/17 0300)  Body mass index is 32.23 kg/m².  Body surface area is 2.1 meters squared.    I/O last 3 completed shifts:  In: 2695 [P.O.:1260; I.V.:100]  Out: 685 [Urine:685]    Physical Exam   Constitutional: He appears well-developed and well-nourished. No distress.   HENT:   Head: Normocephalic and atraumatic.   Eyes:  EOM are normal. Scleral icterus is present.   Cardiovascular: Normal rate and regular rhythm.    Pulmonary/Chest: Effort normal and breath sounds normal.   Musculoskeletal: He exhibits edema. He exhibits no deformity.   Skin: Skin is warm and dry. He is not diaphoretic.   Psychiatric: He has a normal mood and affect. His behavior is normal.       Significant Labs:  CBC:   Recent Labs  Lab 10/16/17  0528   WBC 15.92*   RBC 1.85*   HGB 6.9*   HCT 18.6*   *   *   MCH 37.3*   MCHC 37.1*     CMP:   Recent Labs  Lab 10/16/17  0528      CALCIUM 8.6*   ALBUMIN 3.3*   PROT 6.1   *   K 3.5   CO2 14*   CL 93*   BUN 84*   CREATININE 2.4*   ALKPHOS 107   ALT 65*   *   BILITOT 31.3*     All labs within the past 24 hours have been reviewed.     Significant Imaging:  Labs: Reviewed

## 2017-10-16 NOTE — ASSESSMENT & PLAN NOTE
- likely ischemic ATN from decreased renal perfusion (volume shifts from paracentesis, anemia)  - urine sediment with muddy brown casts  - sCr continues to rise; up to 2.4 today  - associated with non-anion gap metabolic acidosis and hyponatremia  - worsening presentation likely related to increase BMs (5 so far today). Recommend holding lactulose for rest of today  - agree with holding diuretics  - will increase NaBicarb supplementation to correct acidosis  - agree with blood transfusion  - consider midodrine if BP does not improve after transfusion

## 2017-10-16 NOTE — PROGRESS NOTES
[facility]  Adult Nutrition  Progress Note    SUMMARY     Recommendations    Recommendation/Intervention: Oral intake remains poor. Encouraged patient to consume 5 small meals per day with ONS, can order what he wants at any time.     If pt to receive tx, recommend pre-op nutrition support to improve nutrition status. Isosource at goal rate of 60 mL/hr will meet 100% estimated energy and protien needs. RD following    Goals: Meet >80% of EEN/EPN  Nutrition Goal Status: new  Communication of RD Recs: reviewed with physician      Reason for Assessment    Reason for Assessment: RD follow-up  Diagnosis: liver disease (decompensated EtOH hepatitis )  Relevent Medical History: none significant aside from EtOH abuse   Interdisciplinary Rounds: attended     General Information Comments: PO intake is about 25-50% of meals, pt did not eat breakfast today. TPN discontinued, hepatology recommended TPN d/c; Pt still receiving Boost Plus but drinking less 2' nausea; continues to c/o fullness and lack of appetite    Nutrition Discharge Planning: Adequate nutrition     Nutrition Prescription Ordered    Current Diet Order: High protein, high calorie  Nutrition Order Comments: -  Current Nutrition Support Formula Ordered: Discontinued  Current Nutrition Support Rate Ordered: 0 (ml)  Current Nutrition Support Frequency Ordered: mL/hr  Oral Nutrition Supplement: Boost Plus (strawberry)     Evaluation of Received Nutrients/Fluid Intake      Energy Calories Required: not meeting needs      Protein Required: not meeting needs     I/O: + 2495 mL x 24 hrs         Fluid Required: not meeting needs  Comments: LBM 10/10  Tolerance: tolerating  % Intake of Estimated Energy Needs: 25 - 50 %  % Meal Intake: 25%     Nutrition Risk Screen     Nutrition Risk Screen: dysphagia or difficulty swallowing    Nutrition/Diet History    Patient Reported Diet/Restrictions/Preferences: low salt  Typical Food/Fluid Intake: Very poor intake  "for 2-3 weeks PTA  Food Preferences: No Methodist or cultural food preferences noted      Factors Affecting Nutritional Intake: abdominal pain, abdominal distention, decreased appetite, nausea/vomiting, altered gastrointestinal function     Labs/Tests/Procedures/Meds       Pertinent Labs Reviewed: reviewed  Pertinent Labs Comments: Na 122, Cl 93, CO2 14, BUN 84, Cr 2.4, gfr 35.4, Ca 8., TBili 31.3, , ALT 65  Pertinent Medications Reviewed: reviewed  Pertinent Medications Comments: pantoprazole, lactulose    Physical Findings    Overall Physical Appearance: edematous  Tubes:  (none)  Oral/Mouth Cavity:  (blister (lower lip))  Skin: jaundice, intact    Anthropometrics    Temp: 98.5 °F (36.9 °C)     Height: 5' 7" (170.2 cm)  Weight Method: Bed Scale  Weight: 93.4 kg (205 lb 12.8 oz)  Ideal Body Weight (IBW), Male: 148 lb     % Ideal Body Weight, Male (lb): 124.24 lb     BMI (Calculated): 28.9  BMI Grade: 25 - 29.9 - overweight     Usual Body Weight (UBW), kg:  (2/2 fluid shifts)        Estimated/Assessed Needs    Weight Used For Calorie Calculations: 83.4 kg (183 lb 13.8 oz)      Energy Calorie Requirements (kcal): 4308-9846 kcal/d  Energy Need Method: [place]-St Jeor      RMR ([place]-St. Jeor Equation): 1762.62      Weight Used For Protein Calculations: 83.4 kg (183 lb 13.8 oz)  Protein Requirements:  g/d     Fluid Need Method: RDA Method (1ml/kcal or per MD)      RDA Method (mL): 2114           Assessment and Plan    Decompensated hepatic cirrhosis with ascites and HE    Nutrition Problem  Inadequate energy intake    Related to (etiology):   TF d/c with poor oral intake    Signs and Symptoms (as evidenced by):  Meeting 25% EEN and EPN    Interventions/Recommendations (treatment strategy):  See recs    Nutrition Diagnosis Status:   Continues                  Monitor and Evaluation    Food and Nutrient Intake: energy intake, food and beverage intake, enteral nutrition intake  Food and Nutrient " Adminstration: diet order, enteral and parenteral nutrition administration     Physical Activity and Function: nutrition-related ADLs and IADLs  Anthropometric Measurements: weight, weight change, body mass index  Biochemical Data, Medical Tests and Procedures: inflammatory profile, lipid profile, glucose/endocrine profile, gastrointestinal profile, electrolyte and renal panel  Nutrition-Focused Physical Findings: overall appearance    Nutrition Risk    Level of Risk:  (RD f/u 2x wk)    Nutrition Follow-Up    RD Follow-up?: Yes

## 2017-10-16 NOTE — PLAN OF CARE
Problem: Patient Care Overview  Goal: Plan of Care Review  Outcome: Ongoing (interventions implemented as appropriate)  Plan of care reviewed with patient. Pt verbalized understanding. Pt AAOX4. Pt free from falls, injuries and trauma.  Pt free from skin breakdown.  Pt VSS and in NAD.  Pt afebrile.  Pt had no complaints of SOB, N/V or CP.  Pts pain moderately controlled with prn pain medications. Pt ambulated in hallway with standby assistance. Adequate UOP this shift. Several BM this shift. Pt had uneventful evening. Pt in low locked bed with personal items and call light within reach. Fall precautions maintained.

## 2017-10-16 NOTE — PROGRESS NOTES
"[facility] Hospital Medicine  Progress Note    Patient Name: Jhonny Diana  MRN: [id number]  Patient Class: IP- Inpatient   Admission Date: 9/27/2017  Length of Stay: 19 days  Attending Physician: Armand Carrasco MD  Primary Care Provider: Provider [facility] Medicine Team: [facility] HOSP MED A Armand Carrasco MD    Subjective:     Principal Problem:Alcoholic hepatitis with ascites    HPI:  28 yo male with hx of childhood asthma and heavy alcohol abuse (fifth of liquor daily since teenager, last drink 2.5 wks ago per mother) who was transferred to [facility] for higher level of care/liver transplant evaluation. Patient initially presented to OSH for a 2 mo hx of progressively worsening abdominal pain with associated fatigue, jaundice, and abdominal distention with ~20# weight gain. HPI given by patient's mother and OSH records as patient's participation in interview limited by previous administration of pain medication at OSH.      Per mother, patient did not have a fever, chills, dark stools, known sick contacts, nausea, vomiting, diarrhea, confusion, or HA. Reports family hx of alcoholic cirrhosis in father. Pt was recently diagnosed with alcoholic hepatitis during his admission at OSH on 9/6-9/15. During that hospitalization, patient was given ceftriaxone for SBP ppx, prednisone for alcoholic hepatitis, and diuresed. Pt was discharged and instructed to follow up with PCP and AA within 1 wk. After his discharge, patient returned to ED 4 days after for increased weight gain and worsening abdominal pain/jaundice. At OSH, patient was given fluids (for suspected pancreatitis), diuresed and given IV albumin. Two days prior to transfer to [facility], patient had an episode of hematemesis and underwent an EGD on 9/25 that revealed "1 sm varix in distal esophagus, no stigmata of bleeding, gastropathy of portal hypertension, and reflux esophagitis".  No further episodes of hematemesis noted since initial episode. "      Recent labs at OSH: Na 134, CO2 18, BUN 36, Cr 2, Tprot 5.9, albumin 1.9, Tbili 24, alk phos 83, , ALT51. Patient also had lipase 532 and ammonia 118 during his previous hospitalization at OSH. Labwork also notable for negative mitochondrial ab, sm muscle ab, and ceruloplasmin 22. Imaging at OSH revealing abdo U/S (9/20): hepatosplenomegaly, CT abdo w contrast: hepatomegaly with marked fatty infiltration of the liver and mild ascites (9/6). No CDs available for review.    Hospital Course:  Mr. Diana was admitted to the CMICU on 9/27 for decompensated alcoholic hepatitis with high MELD score, severe HE, and LAURA. Hepatology consulted and following to evaluate liver transplant candidacy. Patient not currently a transplant candidate due to recent alcohol use; recommending re-evaluation by addiction psych and social work when more medically stable and able to participate. Pt started on albumin and lasix for LAURA and possibility for HRS. However nephrology felt that LAURA is likely multifactorial: ATN given hypotensive episodes at OSH and toxic given severe hyperbilirubinemia; not truly HRS. Lactulose/rifaximin started for HE and mental status improving slowly. Mr. Diana has had significant ABD pain since admission with ABD distension. CT abdomen was significant for pericolonic fat stranding of right colon; no obstruction. Also with diffuse mesenteric edema. No significant ascites noted, no cholecystitis/cholelithiasis/ductal dilation. US liver with doppler showed appropriate vasculature.  He was stepped down to Hospital Medicine on 10/5.  Paracentesis was done on 10/9 with 3.3L removed.  His diet was advanced and Dietary was consulted to help get adequate caloric intake.    Interval History: patient was accepted for liver transplant; planning for listing on Monday  - afebrile last night off abx; sodium has dropped; stopping PPN and lasix; monitor    Review of Systems   Constitutional: Negative for chills,  fatigue and fever.   HENT: Negative for sore throat and trouble swallowing.    Eyes: Negative for photophobia and visual disturbance.   Respiratory: Negative for cough and shortness of breath.    Cardiovascular: Negative for chest pain, palpitations and leg swelling.   Gastrointestinal: Positive for abdominal distention. Negative for abdominal pain, constipation, diarrhea, nausea and vomiting.   Endocrine: Negative for cold intolerance and heat intolerance.   Genitourinary: Negative for dysuria and frequency.   Musculoskeletal: Negative for arthralgias and myalgias.   Skin: Positive for color change. Negative for rash and wound.   Neurological: Negative for dizziness, syncope, weakness and light-headedness.   Psychiatric/Behavioral: Negative for confusion and hallucinations.   All other systems reviewed and are negative.    Objective:     Vital Signs (Most Recent):  Temp: 97.5 °F (36.4 °C) (10/16/17 1641)  Pulse: 100 (10/16/17 1641)  Resp: 16 (10/16/17 1641)  BP: (!) 120/59 (10/16/17 1641)  SpO2: 96 % (10/16/17 1641) Vital Signs (24h Range):  Temp:  [97.5 °F (36.4 °C)-99.4 °F (37.4 °C)] 97.5 °F (36.4 °C)  Pulse:  [] 100  Resp:  [14-20] 16  SpO2:  [94 %-97 %] 96 %  BP: (107-120)/(54-59) 120/59     Weight: 93.4 kg (205 lb 12.8 oz)  Body mass index is 32.23 kg/m².    Intake/Output Summary (Last 24 hours) at 10/16/17 1847  Last data filed at 10/16/17 1802   Gross per 24 hour   Intake             1360 ml   Output              480 ml   Net              880 ml      Physical Exam   Constitutional: He appears well-developed and well-nourished. He is cooperative.  Non-toxic appearance. No distress.   HENT:   Head: Normocephalic and atraumatic.   Mouth/Throat: Oropharynx is clear and moist.   Eyes: EOM are normal. Pupils are equal, round, and reactive to light. Scleral icterus is present.   Neck: Normal range of motion. Neck supple.   Cardiovascular: Regular rhythm, normal heart sounds and normal pulses.  Tachycardia  present.    No murmur heard.  Sinus tachycardia   Pulmonary/Chest: Effort normal. No respiratory distress. He has decreased breath sounds in the right lower field and the left lower field. He has no wheezes. He has no rales.   Abdominal: Soft. Normal appearance. He exhibits distension. He exhibits no mass. Bowel sounds are decreased. There is no tenderness. There is no rebound and no guarding. No hernia.   Musculoskeletal: Normal range of motion. He exhibits no edema.   Lymphadenopathy:     He has no cervical adenopathy.   Neurological: He is alert. He is not disoriented.   Oriented to person and place today   Skin: Skin is warm and dry. He is not diaphoretic.   Old blister/ wart on right plantar surface of the foot near the big toe   Psychiatric: He has a normal mood and affect. His behavior is normal. His affect is not labile. His speech is delayed. Cognition and memory are not impaired.   Nursing note and vitals reviewed.      Significant Labs:   CBC:     Recent Labs  Lab 10/15/17  0400 10/16/17  0528   WBC 16.02* 15.92*   HGB 7.3* 6.9*   HCT 20.2* 18.6*   * 133*     CMP:     Recent Labs  Lab 10/15/17  0400 10/15/17  1929 10/16/17  0528   * 123* 122*   K 3.8 3.8 3.5   CL 94* 95 93*   CO2 14* 13* 14*   * 110 103   BUN 76* 80* 84*   CREATININE 2.2* 2.0* 2.4*   CALCIUM 8.5* 9.1 8.6*   PROT 6.0  --  6.1   ALBUMIN 3.1*  --  3.3*   BILITOT 29.0*  --  31.3*   ALKPHOS 107  --  107   AST 94*  --  162*   ALT 55*  --  65*   ANIONGAP 14 15 15   EGFRNONAA 39.3* 44.1* 35.4*     Coagulation:     Recent Labs  Lab 10/16/17  0528   INR 1.6*     All pertinent labs within the past 24 hours have been reviewed.    Significant Imaging: CXR: I have reviewed all pertinent results/findings within the past 24 hours and my personal findings are:  NG in appropriate position    Assessment/Plan:      * Alcoholic hepatitis with ascites    · Appreciate Hepatology assistance  · Continue Lactulose and Rifaximin for  HE  · Currently not transplant candidate per Psych and Hep.  Addiction Psych consulted  · IR para 10/9- 7.8 L fluid removed negative for SBP  · Stressed the importance of high calorie diet to prevent NGT insertion again - Boost TID  · Patient agreed to due alcohol rehab if patient was to get a liver transplant; we have initiated inpatient liver transplant evaluation;   · Patient has been accepted for liver transplant, likely will be listed monday    MELD-Na score: 35 at 10/14/2017  4:45 AM  MELD score: 31 at 10/14/2017  4:45 AM  Calculated from:  Serum Creatinine: 2.0 mg/dL at 10/14/2017  4:45 AM  Serum Sodium: 123 mmol/L (Rounded to 125) at 10/14/2017  4:45 AM  Total Bilirubin: 31.3 mg/dL at 10/14/2017  4:45 AM  INR(ratio): 1.6 at 10/14/2017  4:45 AM  Age: 27 years            Ascites due to alcoholic cirrhosis    As above    S/p paracentesis on 10/12 with 1.7 L removed; adding lasix         Severe malnutrition    · SLP consulted and ok for dental soft diet  · Dietary consulted  · Stressed the importance of eating high protein high calorie to prevent NGT re-insertion with TF  · Calorie counts and Boost TID  · Started patient on PPN on 10/11, continue     10/15- stopping PPN today        Coagulopathy    · 2/2 liver disease  · No bleeding; Monitor          Leukocytosis    · WBC remains elevated - stable at 26  · Remains afebrile  · Repeat blood cultures NGTD  · UA negative  · Resp cx ESBL Klebsiella.  ID following.  Will discuss antibiotics        Metabolic acidosis with normal anion gap and bicarbonate losses    · 2/2 diarrhea given lactulose.  Currently on 10g daily with improving BMs  · Still acidotic, will monitor  · Sodium Bicarb TID          Decompensated hepatic cirrhosis with ascites and HE    See alcoholic hepatitis with ascites          Anasarca    · 2/2 liver disease and hypoalbuminemia          Hyponatremia    · 10/15- trending down; holding lasix and stopping PPN; cont albumin              History of  hematemesis    · Pt had an episode of hematemesis at OSH and EGD that revealed 1 small varix, reflux esophagitis, and gastropathy of portal hypertension  · No evidence of bleeding currently and relatively stable hemgolobin  · Continue Pantoprazole BID        Acute renal failure with tubular necrosis    · Appreciate Nephro's help  · Suspect LAURA multifactorial: ischemic ATN given hypotension and injury due to hyperbilirubinemia. Felt less likely to be HRS however have continued albumin with lasix  · Overall, LAURA improving and making adequate urine.  Not measuring amounts as he pulls off condom catheter and is in diapers - but mother reports frequent wet diapers    10/12- renal function worsening, will re-consult nephrology; adding octreotide; strict I/O; decrease UOP    10/14- will transfuse 1 unit of PRBC to help perfusion and add back octreotide and albumin        Hepatic encephalopathy    · Mentation improving  · Continue Lactulose and Rifaxmin    10/10 - mentation AAO times 4; only one BM today; will schedule lactulose today    10/14- titrate down lactulose to only have 3 to 4 BMS daily          Alcoholism /alcohol abuse    · Reported about a fifth of whiskey daily since teenager  · Appreciate Psych and Hep assistance; currently high risk for liver transplant.  Addiction Psych reconsulted, state prognosis is gaurded however would need ABU if transplant is need urgently which patient has agreed to do  · PETH 174 on admit, 9/27          VTE Risk Mitigation         Ordered     Place sequential compression device  Until discontinued      09/28/17 0238     Medium Risk of VTE  Once      09/27/17 6671              Armand Carrasco MD  Department of Hospital Medicine   [facility]

## 2017-10-16 NOTE — SUBJECTIVE & OBJECTIVE
Interval History: patient was accepted for liver transplant and has been approved for listing; will be transferred to LTS tomorrow  - sodium 122, will add salt tabs today; had 6 BMs    Review of Systems   Constitutional: Negative for chills, fatigue and fever.   HENT: Negative for sore throat and trouble swallowing.    Eyes: Negative for photophobia and visual disturbance.   Respiratory: Negative for cough and shortness of breath.    Cardiovascular: Negative for chest pain, palpitations and leg swelling.   Gastrointestinal: Positive for abdominal distention. Negative for abdominal pain, constipation, diarrhea, nausea and vomiting.   Endocrine: Negative for cold intolerance and heat intolerance.   Genitourinary: Negative for dysuria and frequency.   Musculoskeletal: Negative for arthralgias and myalgias.   Skin: Positive for color change. Negative for rash and wound.   Neurological: Negative for dizziness, syncope, weakness and light-headedness.   Psychiatric/Behavioral: Negative for confusion and hallucinations.   All other systems reviewed and are negative.    Objective:     Vital Signs (Most Recent):  Temp: 97.5 °F (36.4 °C) (10/16/17 1641)  Pulse: 100 (10/16/17 1641)  Resp: 16 (10/16/17 1641)  BP: (!) 120/59 (10/16/17 1641)  SpO2: 96 % (10/16/17 1641) Vital Signs (24h Range):  Temp:  [97.5 °F (36.4 °C)-99.4 °F (37.4 °C)] 97.5 °F (36.4 °C)  Pulse:  [] 100  Resp:  [14-20] 16  SpO2:  [94 %-97 %] 96 %  BP: (107-120)/(54-59) 120/59     Weight: 93.4 kg (205 lb 12.8 oz)  Body mass index is 32.23 kg/m².    Intake/Output Summary (Last 24 hours) at 10/16/17 1850  Last data filed at 10/16/17 1802   Gross per 24 hour   Intake             1360 ml   Output              480 ml   Net              880 ml      Physical Exam   Constitutional: He appears well-developed and well-nourished. He is cooperative.  Non-toxic appearance. No distress.   HENT:   Head: Normocephalic and atraumatic.   Mouth/Throat: Oropharynx is clear and  moist.   Eyes: EOM are normal. Pupils are equal, round, and reactive to light. Scleral icterus is present.   Neck: Normal range of motion. Neck supple.   Cardiovascular: Regular rhythm, normal heart sounds and normal pulses.  Tachycardia present.    No murmur heard.  Sinus tachycardia   Pulmonary/Chest: Effort normal. No respiratory distress. He has decreased breath sounds in the right lower field and the left lower field. He has no wheezes. He has no rales.   Abdominal: Soft. Normal appearance. He exhibits distension. He exhibits no mass. Bowel sounds are decreased. There is no tenderness. There is no rebound and no guarding. No hernia.   Musculoskeletal: Normal range of motion. He exhibits no edema.   Lymphadenopathy:     He has no cervical adenopathy.   Neurological: He is alert. He is not disoriented.   Oriented to person and place today   Skin: Skin is warm and dry. He is not diaphoretic.   Old blister/ wart on right plantar surface of the foot near the big toe   Psychiatric: He has a normal mood and affect. His behavior is normal. His affect is not labile. His speech is delayed. Cognition and memory are not impaired.   Nursing note and vitals reviewed.      Significant Labs:   CBC:     Recent Labs  Lab 10/15/17  0400 10/16/17  0528   WBC 16.02* 15.92*   HGB 7.3* 6.9*   HCT 20.2* 18.6*   * 133*     CMP:     Recent Labs  Lab 10/15/17  0400 10/15/17  1929 10/16/17  0528   * 123* 122*   K 3.8 3.8 3.5   CL 94* 95 93*   CO2 14* 13* 14*   * 110 103   BUN 76* 80* 84*   CREATININE 2.2* 2.0* 2.4*   CALCIUM 8.5* 9.1 8.6*   PROT 6.0  --  6.1   ALBUMIN 3.1*  --  3.3*   BILITOT 29.0*  --  31.3*   ALKPHOS 107  --  107   AST 94*  --  162*   ALT 55*  --  65*   ANIONGAP 14 15 15   EGFRNONAA 39.3* 44.1* 35.4*     Coagulation:     Recent Labs  Lab 10/16/17  0528   INR 1.6*     All pertinent labs within the past 24 hours have been reviewed.    Significant Imaging: CXR: I have reviewed all pertinent  results/findings within the past 24 hours and my personal findings are:  NG in appropriate position

## 2017-10-16 NOTE — PROGRESS NOTES
[facility]  Nephrology  Progress Note    Patient Name: Jhonny Diana  MRN: [id number]  Admission Date: 9/27/2017  Hospital Length of Stay: 19 days  Attending Provider: Armand Carrasco MD   Primary Care Physician: Provider [employer]  Principal Problem:Alcoholic hepatitis with ascites    Subjective:     HPI: Jhonny Diana is a 28 yo male with pMHX of childhood asthma and alcohol abuse who was was transferred to [facility] for hepatology evaluation for worsening liver functions.  Information was obtained from family at bedside and review of outside records, given that patient is obtunded.  According to mother at bedside, patient has had multiple admissions to the hospital over the past 6 months for recurrent abdominal pain.  She reports that he has been been seen the ED and has been diagnosed with diverticulitis, UTIs, gastroenteritis, and cholecystitis.  He was never admitted to the hospital and was always discharged.  On 09/06, patient presented to OSH for worsening abdominal pain and distension.  He was diagnosed at that time with ETOH hepatitis.  He was treated with steroids, ceftriaxone for SBP prophylaxis, and diuresed at that time for volume management.  He was discharged home on the 15th, after clearance from GI,a nd was instructed to follow-up with his PCP 1 week after discharge.  He presented to the hospital on 09/25 for worsening abdominal pain and distension with 20 kg weight gain.  He was transferred to [facility] for further care and Nephrology was consulted for LAURA.    On review of outside records, patient Scr on admission was 1.5 (with baseline at 1.0).  According to the family at the bedside, he was not urinating well at the OSH, and not responding to diuretics (spirionolactone/lasix combo).  His mother reports that patient continued to c/o abdominal and inability to urinate, but no chicas was placed until transfer to [facility] yesterday.  Record reviews shows relative hypotension with BPs in the low 90's.   His mother reports that the nursing staff was administrating his narcotic regimen based on his blood pressures, and at times, medication was held due to hypotension.  Since admission to hospital, his UOP has improved, responding well in Lasix 40 IV (770 ml since admission, 700 ml so far this shift at time of consultation).  Admit labs revealed a SCr of 2.6, this morning improved down to 2.4.  Bilirubin remains elevated, >14.  Abdomen remains with tight distension, unable to perform paracentesis, as no significant fluid pocket found.  Mother reports patient did not have para's done at OSH.    Labs on the morning of 09/27 with bicarb of 15.  ABG with alkalemia and primary respiratory alkalosis and anion gap acidosis.     Interval History:   Lasix stopped over weekend. No events overnight. Hopefully being listed for liver transplant today. Reports increased BMs today; 5 so far this morning. Denies SOB. Good UOP. Having some bleeding from his lip. Blood transfusion today.   UOP 200cc + 2x.     Review of patient's allergies indicates:   Allergen Reactions    Bactrim [sulfamethoxazole-trimethoprim] Other (See Comments)     Mookie Trell Syndrome     Current Facility-Administered Medications   Medication Frequency    0.9%  NaCl infusion (for blood administration) Q24H PRN    0.9%  NaCl infusion (for blood administration) Q24H PRN    fluconazole tablet 200 mg Daily    hepatitis B virus vacc.rec(PF) injection 40 mcg vaccine x 1 dose    [START ON 10/17/2017] lactulose 20 gram/30 mL solution Soln 15 g TID    octreotide injection 100 mcg Q8H    ondansetron injection 4 mg Q4H PRN    pantoprazole EC tablet 40 mg BID AC    promethazine (PHENERGAN) 12.5 mg in dextrose 5 % 50 mL IVPB Q6H PRN    rifAXIMin tablet 550 mg BID    sodium bicarbonate tablet 1,300 mg TID    sucralfate 100 mg/mL suspension 1 g Q6H    tramadol tablet 50 mg Q4H PRN       Objective:     Vital Signs (Most Recent):  Temp: 98.5 °F (36.9 °C)  (10/16/17 1153)  Pulse: 102 (10/16/17 1153)  Resp: 16 (10/16/17 1153)  BP: (!) 108/59 (10/16/17 1153)  SpO2: (!) 94 % (10/16/17 1153)  O2 Device (Oxygen Therapy): room air (10/16/17 1153) Vital Signs (24h Range):  Temp:  [98.3 °F (36.8 °C)-99.4 °F (37.4 °C)] 98.5 °F (36.9 °C)  Pulse:  [] 102  Resp:  [14-22] 16  SpO2:  [94 %-96 %] 94 %  BP: (107-122)/(57-61) 108/59     Weight: 93.4 kg (205 lb 12.8 oz) (10/15/17 0300)  Body mass index is 32.23 kg/m².  Body surface area is 2.1 meters squared.    I/O last 3 completed shifts:  In: 2695 [P.O.:1260; I.V.:100]  Out: 685 [Urine:685]    Physical Exam   Constitutional: He appears well-developed and well-nourished. No distress.   HENT:   Head: Normocephalic and atraumatic.   Eyes: EOM are normal. Scleral icterus is present.   Cardiovascular: Normal rate and regular rhythm.    Pulmonary/Chest: Effort normal and breath sounds normal.   Musculoskeletal: He exhibits edema. He exhibits no deformity.   Skin: Skin is warm and dry. He is not diaphoretic.   Psychiatric: He has a normal mood and affect. His behavior is normal.       Significant Labs:  CBC:   Recent Labs  Lab 10/16/17  0528   WBC 15.92*   RBC 1.85*   HGB 6.9*   HCT 18.6*   *   *   MCH 37.3*   MCHC 37.1*     CMP:   Recent Labs  Lab 10/16/17  0528      CALCIUM 8.6*   ALBUMIN 3.3*   PROT 6.1   *   K 3.5   CO2 14*   CL 93*   BUN 84*   CREATININE 2.4*   ALKPHOS 107   ALT 65*   *   BILITOT 31.3*     All labs within the past 24 hours have been reviewed.     Significant Imaging:  Labs: Reviewed    Assessment/Plan:     LAURA (acute kidney injury)    - likely ischemic ATN from decreased renal perfusion (volume shifts from paracentesis, anemia)  - urine sediment with muddy brown casts  - sCr continues to rise; up to 2.4 today  - associated with non-anion gap metabolic acidosis and hyponatremia  - worsening presentation likely related to increase BMs (5 so far today). Recommend holding lactulose  for rest of today  - agree with holding diuretics  - will increase NaBicarb supplementation to correct acidosis  - agree with blood transfusion  - consider midodrine if BP does not improve after transfusion           Hyponatremia    - previously studies c/w increased ADH secretion in setting of cirrhosis; surprisingly serum osm was wnl. Will repeat today  - Na down to 122 today. Na must be >125 prior to transplant  - can consider starting salt tablets 1g BID to increase Na levels prior to transplant  - as a last resort, can also consider conivaptan infusion (if no prior h/o varices) to ensure patient receives transplant  - will continue to monitor            Moon Carlton, PGY-5  Nephrology Fellow  [facility]  Pager: [phone]  I have reviewed and concur with the fellow's history, physical, assessment, and plan. I have personally interviewed and examined the patient at bedside.

## 2017-10-16 NOTE — PLAN OF CARE
Problem: Physical Therapy Goal  Goal: Physical Therapy Goal  Goals to be met by: 10/16/2017    Patient will increase functional independence with mobility by performin. Supine to sit with MInimal Assistance Met 10/9/17   1A. supine to sit with SBA- Met   1B. Mod I   2. Sit to supine with MInimal Assistance - GOAL MET  3. Sit to stand transfer with Minimal Assistance - met 10/5   3A: Sit to stand transfer with SBA using LRAD or no AD. Met 10/11/17  4. Bed to chair transfer with Moderate Assistance using LRAD or no AD.- Met   4A. SBA using LRAD  5. Gait  x 20 feet with Moderate Assistance using LRAD or no Ad. Met 10/9/17   5A: Gait x150 feet with SBA using LRAD or no AD. Met 10/11/2017 with RW  6. Ascend/descend 4 stair with bilateral Handrails Moderate Assistance using LRAD or no AD.- GOAL MET              6A. CGA  7. Stand for 8 minutes with Minimal Assistance using LRAD or no AD. - GOAL MET  8. Lower extremity exercise program x20 reps per handout, with independence          Outcome: Ongoing (interventions implemented as appropriate)  Pt achieved 5/8 goals.

## 2017-10-16 NOTE — SUBJECTIVE & OBJECTIVE
Interval History:     Doing well this morning.  Happy to hear he was financially approved.  Wants to know what he needs to do to improve.    Interested in his plan for low sodium      Current Facility-Administered Medications   Medication    0.9%  NaCl infusion (for blood administration)    0.9%  NaCl infusion (for blood administration)    fluconazole tablet 200 mg    hepatitis B virus vacc.rec(PF) injection 40 mcg    [START ON 10/17/2017] lactulose 20 gram/30 mL solution Soln 15 g    octreotide injection 100 mcg    ondansetron injection 4 mg    pantoprazole EC tablet 40 mg    promethazine (PHENERGAN) 12.5 mg in dextrose 5 % 50 mL IVPB    rifAXIMin tablet 550 mg    sodium bicarbonate tablet 1,950 mg    sucralfate 100 mg/mL suspension 1 g    tramadol tablet 50 mg       Objective:     Vital Signs (Most Recent):  Temp: 97.5 °F (36.4 °C) (10/16/17 1641)  Pulse: 100 (10/16/17 1641)  Resp: 16 (10/16/17 1641)  BP: (!) 120/59 (10/16/17 1641)  SpO2: 96 % (10/16/17 1641) Vital Signs (24h Range):  Temp:  [97.5 °F (36.4 °C)-99.4 °F (37.4 °C)] 97.5 °F (36.4 °C)  Pulse:  [] 100  Resp:  [14-20] 16  SpO2:  [94 %-97 %] 96 %  BP: (107-120)/(54-59) 120/59     Weight: 93.4 kg (205 lb 12.8 oz) (10/15/17 0300)  Body mass index is 32.23 kg/m².    Physical Exam   Constitutional: He is oriented to person, place, and time. No distress.   HENT:   Head: Normocephalic.   Eyes: Scleral icterus is present.   Neck: Normal range of motion.   Cardiovascular: Normal rate.         Pulmonary/Chest: Effort normal and breath sounds normal. He has no rales.   Abdominal: Bowel sounds are normal. He exhibits distension. There is no rebound.   Musculoskeletal: He exhibits edema.   Neurological: He is alert and oriented to person, place, and time.   Skin: He is not diaphoretic.   jaundiced   Psychiatric: He has a normal mood and affect. His behavior is normal.       MELD-Na score: 36 at 10/16/2017  5:28 AM  MELD score: 33 at 10/16/2017   5:28 AM  Calculated from:  Serum Creatinine: 2.4 mg/dL at 10/16/2017  5:28 AM  Serum Sodium: 122 mmol/L (Rounded to 125) at 10/16/2017  5:28 AM  Total Bilirubin: 31.3 mg/dL at 10/16/2017  5:28 AM  INR(ratio): 1.6 at 10/16/2017  5:28 AM  Age: 27 years    Significant Labs:  CBC:   Recent Labs  Lab 10/16/17  0528   WBC 15.92*   RBC 1.85*   HGB 6.9*   HCT 18.6*   *     BMP:   Recent Labs  Lab 10/16/17  0528      *   K 3.5   CL 93*   CO2 14*   BUN 84*   CREATININE 2.4*   CALCIUM 8.6*     CMP:   Recent Labs  Lab 10/16/17  0528      CALCIUM 8.6*   ALBUMIN 3.3*   PROT 6.1   *   K 3.5   CO2 14*   CL 93*   BUN 84*   CREATININE 2.4*   ALKPHOS 107   ALT 65*   *   BILITOT 31.3*     Coagulation:   Recent Labs  Lab 10/16/17  0528   INR 1.6*       Significant Imaging:  Labs: Reviewed

## 2017-10-16 NOTE — NURSING
PRE-TRANSPLANT NOTE:    This patient's medication therapy was evaluated as part of his pre-transplant evaluation.    The following pharmacologic concerns were noted: N/A    No current facility-administered medications for this visit.      No current outpatient prescriptions on file.     Facility-Administered Medications Ordered in Other Visits   Medication Dose Route Frequency Provider Last Rate Last Dose    0.9%  NaCl infusion (for blood administration)   Intravenous Q24H PRN Armand Carrasco MD        0.9%  NaCl infusion (for blood administration)   Intravenous Q24H PRN Armand Carrasco MD        fluconazole tablet 200 mg  200 mg Oral Daily Armand Carrasco MD   200 mg at 10/16/17 0909    hepatitis B virus vacc.rec(PF) injection 40 mcg  40 mcg Intramuscular vaccine x 1 dose Marcela Longoria MD   40 mcg at 10/12/17 2012    lactulose 20 gram/30 mL solution Soln 20 g  20 g Oral TID Armand Carrasco MD        octreotide injection 100 mcg  100 mcg Intravenous Q8H Armand Carrasco MD   100 mcg at 10/16/17 0509    ondansetron injection 4 mg  4 mg Intravenous Q4H PRN Armand Carrasco MD   4 mg at 10/15/17 1658    pantoprazole EC tablet 40 mg  40 mg Oral BID AC Mi Braun MD   40 mg at 10/16/17 0509    promethazine (PHENERGAN) 12.5 mg in dextrose 5 % 50 mL IVPB  12.5 mg Intravenous Q6H PRN Armand Carrasco MD        rifAXIMin tablet 550 mg  550 mg Oral BID Sajan Sandra MD   550 mg at 10/16/17 0909    sodium bicarbonate tablet 1,300 mg  1,300 mg Per NG tube TID Mi Braun MD   1,300 mg at 10/16/17 0509    sucralfate 100 mg/mL suspension 1 g  1 g Oral Q6H Divya Booker MD   1 g at 10/16/17 0508    tramadol tablet 50 mg  50 mg Oral Q4H PRN Armand Carrasco MD   50 mg at 10/16/17 0529       I am available for consultation and can be contacted, as needed by the other members of the Liver Transplant team.

## 2017-10-16 NOTE — ASSESSMENT & PLAN NOTE
Hyponatremia mildly improving this morning after he was given albumin yesterday.    Recommendations:  - Continue to restrict to 1L  - hold diuretics and hold albumin

## 2017-10-16 NOTE — ASSESSMENT & PLAN NOTE
· SLP consulted and ok for dental soft diet  · Dietary consulted  · Stressed the importance of eating high protein high calorie to prevent NGT re-insertion with TF  · Calorie counts and Boost TID  · Started patient on PPN on 10/11, continue     10/15- stopping PPN today

## 2017-10-17 LAB
ALBUMIN SERPL BCP-MCNC: 3.2 G/DL
ALP SERPL-CCNC: 118 U/L
ALT SERPL W/O P-5'-P-CCNC: 77 U/L
ANION GAP SERPL CALC-SCNC: 16 MMOL/L
ANISOCYTOSIS BLD QL SMEAR: SLIGHT
AST SERPL-CCNC: 188 U/L
BASOPHILS # BLD AUTO: 0.06 K/UL
BASOPHILS NFR BLD: 0.4 %
BILIRUB SERPL-MCNC: 33.3 MG/DL
BUN SERPL-MCNC: 87 MG/DL
BURR CELLS BLD QL SMEAR: ABNORMAL
CALCIUM SERPL-MCNC: 8.9 MG/DL
CHLORIDE SERPL-SCNC: 93 MMOL/L
CHLORIDE UR-SCNC: <20 MMOL/L
CO2 SERPL-SCNC: 16 MMOL/L
CREAT SERPL-MCNC: 2.2 MG/DL
DIFFERENTIAL METHOD: ABNORMAL
EOSINOPHIL # BLD AUTO: 0.2 K/UL
EOSINOPHIL NFR BLD: 1.2 %
ERYTHROCYTE [DISTWIDTH] IN BLOOD BY AUTOMATED COUNT: 18.8 %
EST. GFR  (AFRICAN AMERICAN): 45.4 ML/MIN/1.73 M^2
EST. GFR  (NON AFRICAN AMERICAN): 39.3 ML/MIN/1.73 M^2
GLUCOSE SERPL-MCNC: 109 MG/DL
HCT VFR BLD AUTO: 22.8 %
HGB BLD-MCNC: 8.2 G/DL
IMM GRANULOCYTES # BLD AUTO: 0.14 K/UL
IMM GRANULOCYTES NFR BLD AUTO: 0.8 %
INR PPP: 1.6
LYMPHOCYTES # BLD AUTO: 1 K/UL
LYMPHOCYTES NFR BLD: 6.1 %
MAGNESIUM SERPL-MCNC: 2.2 MG/DL
MCH RBC QN AUTO: 36.1 PG
MCHC RBC AUTO-ENTMCNC: 36 G/DL
MCV RBC AUTO: 100 FL
MONOCYTES # BLD AUTO: 1.9 K/UL
MONOCYTES NFR BLD: 11.1 %
NEUTROPHILS # BLD AUTO: 13.6 K/UL
NEUTROPHILS NFR BLD: 80.4 %
NRBC BLD-RTO: 0 /100 WBC
PHOSPHATE SERPL-MCNC: 4.2 MG/DL
PLATELET # BLD AUTO: 146 K/UL
PLATELET BLD QL SMEAR: ABNORMAL
PMV BLD AUTO: 12.7 FL
POIKILOCYTOSIS BLD QL SMEAR: SLIGHT
POTASSIUM SERPL-SCNC: 3.5 MMOL/L
POTASSIUM UR-SCNC: 25 MMOL/L
PROT SERPL-MCNC: 6 G/DL
PROTHROMBIN TIME: 16.1 SEC
RBC # BLD AUTO: 2.27 M/UL
SCHISTOCYTES BLD QL SMEAR: PRESENT
SODIUM SERPL-SCNC: 125 MMOL/L
SODIUM UR-SCNC: <20 MMOL/L
WBC # BLD AUTO: 16.89 K/UL

## 2017-10-17 PROCEDURE — 99233 SBSQ HOSP IP/OBS HIGH 50: CPT | Mod: NTX,,, | Performed by: INTERNAL MEDICINE

## 2017-10-17 PROCEDURE — 99233 SBSQ HOSP IP/OBS HIGH 50: CPT | Mod: NTX,,, | Performed by: HOSPITALIST

## 2017-10-17 PROCEDURE — 85025 COMPLETE CBC W/AUTO DIFF WBC: CPT | Mod: NTX

## 2017-10-17 PROCEDURE — 25000003 PHARM REV CODE 250: Mod: NTX | Performed by: INTERNAL MEDICINE

## 2017-10-17 PROCEDURE — 97530 THERAPEUTIC ACTIVITIES: CPT | Mod: NTX

## 2017-10-17 PROCEDURE — 36415 COLL VENOUS BLD VENIPUNCTURE: CPT | Mod: NTX

## 2017-10-17 PROCEDURE — 20600001 HC STEP DOWN PRIVATE ROOM: Mod: NTX

## 2017-10-17 PROCEDURE — 80053 COMPREHEN METABOLIC PANEL: CPT | Mod: NTX

## 2017-10-17 PROCEDURE — 97116 GAIT TRAINING THERAPY: CPT | Mod: NTX

## 2017-10-17 PROCEDURE — 25000003 PHARM REV CODE 250: Mod: NTX | Performed by: HOSPITALIST

## 2017-10-17 PROCEDURE — 63600175 PHARM REV CODE 636 W HCPCS: Mod: NTX | Performed by: HOSPITALIST

## 2017-10-17 PROCEDURE — 84100 ASSAY OF PHOSPHORUS: CPT | Mod: NTX

## 2017-10-17 PROCEDURE — 25000003 PHARM REV CODE 250: Mod: NTX | Performed by: STUDENT IN AN ORGANIZED HEALTH CARE EDUCATION/TRAINING PROGRAM

## 2017-10-17 PROCEDURE — 85610 PROTHROMBIN TIME: CPT | Mod: NTX

## 2017-10-17 PROCEDURE — 83735 ASSAY OF MAGNESIUM: CPT | Mod: NTX

## 2017-10-17 RX ADMIN — SUCRALFATE 1 G: 1 SUSPENSION ORAL at 10:10

## 2017-10-17 RX ADMIN — SODIUM BICARBONATE 650 MG TABLET 1950 MG: at 02:10

## 2017-10-17 RX ADMIN — TRAMADOL HYDROCHLORIDE 50 MG: 50 TABLET, COATED ORAL at 05:10

## 2017-10-17 RX ADMIN — OCTREOTIDE ACETATE 100 MCG: 100 INJECTION, SOLUTION INTRAVENOUS; SUBCUTANEOUS at 04:10

## 2017-10-17 RX ADMIN — RIFAXIMIN 550 MG: 550 TABLET ORAL at 09:10

## 2017-10-17 RX ADMIN — SODIUM CHLORIDE TAB 1 GM 1 G: 1 TAB at 12:10

## 2017-10-17 RX ADMIN — FLUCONAZOLE 200 MG: 200 TABLET ORAL at 09:10

## 2017-10-17 RX ADMIN — SUCRALFATE 1 G: 1 SUSPENSION ORAL at 05:10

## 2017-10-17 RX ADMIN — SODIUM CHLORIDE TAB 1 GM 1 G: 1 TAB at 05:10

## 2017-10-17 RX ADMIN — LACTULOSE 15 G: 20 SOLUTION ORAL at 08:10

## 2017-10-17 RX ADMIN — SUCRALFATE 1 G: 1 SUSPENSION ORAL at 12:10

## 2017-10-17 RX ADMIN — SODIUM BICARBONATE 650 MG TABLET 1950 MG: at 08:10

## 2017-10-17 RX ADMIN — OCTREOTIDE ACETATE 100 MCG: 100 INJECTION, SOLUTION INTRAVENOUS; SUBCUTANEOUS at 05:10

## 2017-10-17 RX ADMIN — OCTREOTIDE ACETATE 100 MCG: 100 INJECTION, SOLUTION INTRAVENOUS; SUBCUTANEOUS at 10:10

## 2017-10-17 RX ADMIN — RIFAXIMIN 550 MG: 550 TABLET ORAL at 08:10

## 2017-10-17 RX ADMIN — PANTOPRAZOLE SODIUM 40 MG: 40 TABLET, DELAYED RELEASE ORAL at 05:10

## 2017-10-17 RX ADMIN — SODIUM BICARBONATE 650 MG TABLET 1950 MG: at 05:10

## 2017-10-17 RX ADMIN — LACTULOSE 15 G: 20 SOLUTION ORAL at 02:10

## 2017-10-17 RX ADMIN — ONDANSETRON 4 MG: 2 INJECTION INTRAMUSCULAR; INTRAVENOUS at 02:10

## 2017-10-17 NOTE — SUBJECTIVE & OBJECTIVE
Interval History: NAEON.  Improved Urine output 1300cc.  In good spirits, reports he is listed for transplant.  Denies SOB. Good UOP.  Creatine down trending 2.2    Review of patient's allergies indicates:   Allergen Reactions    Bactrim [sulfamethoxazole-trimethoprim] Other (See Comments)     Mookie Trell Syndrome     Current Facility-Administered Medications   Medication Frequency    fluconazole tablet 200 mg Daily    hepatitis B virus vacc.rec(PF) injection 40 mcg vaccine x 1 dose    lactulose 20 gram/30 mL solution Soln 15 g TID    octreotide injection 100 mcg Q8H    ondansetron injection 4 mg Q4H PRN    pantoprazole EC tablet 40 mg BID AC    promethazine (PHENERGAN) 12.5 mg in dextrose 5 % 50 mL IVPB Q6H PRN    rifAXIMin tablet 550 mg BID    sodium bicarbonate tablet 1,950 mg TID    sodium chloride tablet 1 g QID    sucralfate 100 mg/mL suspension 1 g Q6H    tramadol tablet 50 mg Q4H PRN       Objective:     Vital Signs (Most Recent):  Temp: 99.2 °F (37.3 °C) (10/17/17 1131)  Pulse: 96 (10/17/17 1238)  Resp: 16 (10/17/17 1131)  BP: (!) 111/55 (10/17/17 1131)  SpO2: 95 % (10/17/17 1131)  O2 Device (Oxygen Therapy): nasal cannula (10/17/17 1131) Vital Signs (24h Range):  Temp:  [97.5 °F (36.4 °C)-99.2 °F (37.3 °C)] 99.2 °F (37.3 °C)  Pulse:  [] 96  Resp:  [15-18] 16  SpO2:  [95 %-98 %] 95 %  BP: (107-120)/(48-59) 111/55     Weight: 94.1 kg (207 lb 7.3 oz) (10/17/17 0430)  Body mass index is 32.49 kg/m².  Body surface area is 2.11 meters squared.    I/O last 3 completed shifts:  In: 2080 [P.O.:2080]  Out: 1230 [Urine:1230]    Physical Exam   Constitutional: He appears well-developed and well-nourished. No distress.   HENT:   Head: Normocephalic and atraumatic.   Eyes: EOM are normal. Scleral icterus is present.   Cardiovascular: Normal rate and regular rhythm.    Pulmonary/Chest: Effort normal and breath sounds normal.   Musculoskeletal: He exhibits edema. He exhibits no deformity.   Skin:  Skin is warm and dry. He is not diaphoretic.   Psychiatric: He has a normal mood and affect. His behavior is normal.       Significant Labs:  CBC:     Recent Labs  Lab 10/17/17  0527   WBC 16.89*   RBC 2.27*   HGB 8.2*   HCT 22.8*   *   *   MCH 36.1*   MCHC 36.0     CMP:     Recent Labs  Lab 10/17/17  0527      CALCIUM 8.9   ALBUMIN 3.2*   PROT 6.0   *   K 3.5   CO2 16*   CL 93*   BUN 87*   CREATININE 2.2*   ALKPHOS 118   ALT 77*   *   BILITOT 33.3*     All labs within the past 24 hours have been reviewed.     Significant Imaging:  Labs: Reviewed

## 2017-10-17 NOTE — PT/OT/SLP PROGRESS
"Physical Therapy  Treatment    Jhonny Diana   MRN: [id number]   Admitting Diagnosis: Alcoholic hepatitis with ascites    PT Received On: 10/17/17  PT Start Time: 1511     PT Stop Time: 1554    PT Total Time (min): 43 min       Billable Minutes:  Gait Training 35 and Therapeutic Activity 8    Treatment Type: Treatment  PT/PTA: PT     PTA Visit Number: 0       General Precautions: Standard, contact, fall  Orthopedic Precautions: N/A   Braces: N/A    Do you have any cultural, spiritual, Yarsani conflicts, given your current situation?: none reported     Subjective:  Communicated with nursing prior to session.  "I'm so nervous." - regarding stair ambulation    Pain/Comfort  Pain Rating 1: 4/10  Location 1: abdomen    Objective:   Patient found with: peripheral IV, oxygen, telemetry    Functional Mobility:  Bed Mobility:   Scooting/Bridging: Supervision  Supine to Sit: Supervision    Transfers:  Sit <> Stand Assistance: Stand By Assistance (Pt performed from multiple surfaces, bed and w/c.  Pt performed with multiple attempts to stand prior to achieving upright posture, with ed for hand placement and ant wt shifting to facilitate buttocks elevation. )  Sit <> Stand Assistive Device: No Assistive Device (armrests)  Bed <> Chair Technique: Stand Pivot  Bed <> Chair Assistance: Contact Guard Assistance  Bed <> Chair Assistive Device: Rolling Walker  Toilet Transfer Technique: Stand Pivot  Toilet Transfer Assistance: Stand By Assistance  Toilet Transfer Assistive Device: No Assistive Device    Gait:   Gait Distance: Pt amb 425, with 2 sitting rest breaks, 2 episodes of LOB with turning.  Ed for body placement with AD.  Monty to recover from LOB.  Assistance 1: Stand by Assistance, Minimum assistance, Contact Guard Assistance  Gait Assistive Device: Rolling walker  Gait Pattern: swing-through gait  Gait Deviation(s): decreased saskia, increased time in double stance, decreased step length, decreased stride " length    Stairs:  Pt ascended/descend 4 stair(s) with No Assistive Device with left HR with Minimal Assistance.     Balance:   Static Sit: FAIR+: Able to take MINIMAL challenges from all directions  Dynamic Sit: FAIR+: Maintains balance through MINIMAL excursions of active trunk motion  Static Stand: FAIR: Maintains without assist but unable to take challenges  Dynamic stand: FAIR+: Needs CLOSE SUPERVISION during gait and is able to right self with minor LOB     Therapeutic Activities and Exercises:  Whiteboard updated  Verbal review of HEP and walking program     AM-PAC 6 CLICK MOBILITY  How much help from another person does this patient currently need?   1 = Unable, Total/Dependent Assistance  2 = A lot, Maximum/Moderate Assistance  3 = A little, Minimum/Contact Guard/Supervision  4 = None, Modified [place]/Independent    Turning over in bed (including adjusting bedclothes, sheets and blankets)?: 3  Sitting down on and standing up from a chair with arms (e.g., wheelchair, bedside commode, etc.): 3  Moving from lying on back to sitting on the side of the bed?: 3  Moving to and from a bed to a chair (including a wheelchair)?: 3  Need to walk in hospital room?: 3  Climbing 3-5 steps with a railing?: 3  Total Score: 18    AM-PAC Raw Score CMS G-Code Modifier Level of Impairment Assistance   6 % Total / Unable   7 - 9 CM 80 - 100% Maximal Assist   10 - 14 CL 60 - 80% Moderate Assist   15 - 19 CK 40 - 60% Moderate Assist   20 - 22 CJ 20 - 40% Minimal Assist   23 CI 1-20% SBA / CGA   24 CH 0% Independent/ Mod I     Patient left toileting with all lines intact, call button in reach and mother present.    Assessment:  Jhonny Diana is a 27 y.o. male with a medical diagnosis of Alcoholic hepatitis with ascites.  Pt was given news today that he is higher on the liver transplant list, sec to an increased MELD score of 35.  Pt was willing to participate today in tx session, though heavily fatigued sec to reports  of lack of sleep last night.  Gait and balance were notable impaired today, especially with turns.  Pt required use of AD with gait training, whereas yesterday was able to amb without AD support.  Pt will cont to benefit from skilled PT services to improve functional transfers and gait.      Rehab identified problem list/impairments: Rehab identified problem list/impairments: weakness, impaired endurance, impaired cardiopulmonary response to activity, pain, decreased lower extremity function, gait instability, impaired balance, impaired functional mobilty, edema, impaired coordination, impaired self care skills    Rehab potential is fair.    Activity tolerance: Fair    Discharge recommendations: Discharge Facility/Level Of Care Needs: home health PT     Barriers to discharge: Barriers to Discharge: None    Equipment recommendations: Equipment Needed After Discharge: none     GOALS:    Physical Therapy Goals        Problem: Physical Therapy Goal    Goal Priority Disciplines Outcome Goal Variances Interventions   Physical Therapy Goal     PT/OT, PT Ongoing (interventions implemented as appropriate)     Description:  Goals to be met by: 2017    Patient will increase functional independence with mobility by performin. Supine to sit with Mod I.   2. Sit to supine with mod I.  3. Sit to stand transfer with SBA using LRAD or no AD.  4. Bed to chair transfer with SBA using LRAD  5. Gait x150 feet with SBA using LRAD or no AD.  6. Ascend/descend 4 stair with bilateral Handrails with CGA.  7. Lower extremity exercise program x20 reps per handout, with independence                             PLAN:    Patient to be seen 4 x/week  to address the above listed problems via gait training, therapeutic activities, therapeutic exercises, neuromuscular re-education  Plan of Care expires: 17  Plan of Care reviewed with: patient, mother         Tory Amato, PT  10/17/2017

## 2017-10-17 NOTE — ASSESSMENT & PLAN NOTE
10/16- due to hemolysis; no evidence of overt GI bleeding; will transfuse 1 unit of PRBC today    10/17- H/H responded appropriately to the blood transfusion; monitor

## 2017-10-17 NOTE — SUBJECTIVE & OBJECTIVE
Interval History: patient's sodium has finally reached 125; patient has been listed for liver transplant and will be transferred to LTS; service;     Review of Systems   Constitutional: Negative for chills, fatigue and fever.   HENT: Negative for sore throat and trouble swallowing.    Eyes: Negative for photophobia and visual disturbance.   Respiratory: Negative for cough and shortness of breath.    Cardiovascular: Negative for chest pain, palpitations and leg swelling.   Gastrointestinal: Positive for abdominal distention. Negative for abdominal pain, constipation, diarrhea, nausea and vomiting.   Endocrine: Negative for cold intolerance and heat intolerance.   Genitourinary: Negative for dysuria and frequency.   Musculoskeletal: Negative for arthralgias and myalgias.   Skin: Positive for color change. Negative for rash and wound.   Neurological: Negative for dizziness, syncope, weakness and light-headedness.   Psychiatric/Behavioral: Negative for confusion and hallucinations.   All other systems reviewed and are negative.    Objective:     Vital Signs (Most Recent):  Temp: 98.8 °F (37.1 °C) (10/17/17 1705)  Pulse: 96 (10/17/17 1705)  Resp: 16 (10/17/17 1705)  BP: (!) 107/59 (10/17/17 1705)  SpO2: 96 % (10/17/17 1705) Vital Signs (24h Range):  Temp:  [98.1 °F (36.7 °C)-99.2 °F (37.3 °C)] 98.8 °F (37.1 °C)  Pulse:  [] 96  Resp:  [15-18] 16  SpO2:  [95 %-98 %] 96 %  BP: (107-120)/(48-59) 107/59     Weight: 94.1 kg (207 lb 7.3 oz)  Body mass index is 32.49 kg/m².    Intake/Output Summary (Last 24 hours) at 10/17/17 1818  Last data filed at 10/17/17 1700   Gross per 24 hour   Intake             1260 ml   Output             1220 ml   Net               40 ml      Physical Exam   Constitutional: He appears well-developed and well-nourished. He is cooperative.  Non-toxic appearance. No distress.   HENT:   Head: Normocephalic and atraumatic.   Mouth/Throat: Oropharynx is clear and moist.   Eyes: EOM are normal. Pupils  are equal, round, and reactive to light. Scleral icterus is present.   Neck: Normal range of motion. Neck supple.   Cardiovascular: Regular rhythm, normal heart sounds and normal pulses.  Tachycardia present.    No murmur heard.  Sinus tachycardia   Pulmonary/Chest: Effort normal. No respiratory distress. He has decreased breath sounds in the right lower field and the left lower field. He has no wheezes. He has no rales.   Abdominal: Soft. Normal appearance. He exhibits distension. He exhibits no mass. Bowel sounds are decreased. There is no tenderness. There is no rebound and no guarding. No hernia.   Musculoskeletal: Normal range of motion. He exhibits no edema.   Lymphadenopathy:     He has no cervical adenopathy.   Neurological: He is alert. He is not disoriented.   Oriented to person and place today   Skin: Skin is warm and dry. He is not diaphoretic.   Old blister/ wart on right plantar surface of the foot near the big toe   Psychiatric: He has a normal mood and affect. His behavior is normal. His affect is not labile. His speech is delayed. Cognition and memory are not impaired.   Nursing note and vitals reviewed.      Significant Labs:   CBC:     Recent Labs  Lab 10/16/17  0528 10/17/17  0527   WBC 15.92* 16.89*   HGB 6.9* 8.2*   HCT 18.6* 22.8*   * 146*     CMP:     Recent Labs  Lab 10/15/17  1929 10/16/17  0528 10/17/17  0527   * 122* 125*   K 3.8 3.5 3.5   CL 95 93* 93*   CO2 13* 14* 16*    103 109   BUN 80* 84* 87*   CREATININE 2.0* 2.4* 2.2*   CALCIUM 9.1 8.6* 8.9   PROT  --  6.1 6.0   ALBUMIN  --  3.3* 3.2*   BILITOT  --  31.3* 33.3*   ALKPHOS  --  107 118   AST  --  162* 188*   ALT  --  65* 77*   ANIONGAP 15 15 16   EGFRNONAA 44.1* 35.4* 39.3*     Coagulation:     Recent Labs  Lab 10/17/17  0527   INR 1.6*     All pertinent labs within the past 24 hours have been reviewed.    Significant Imaging: CXR: I have reviewed all pertinent results/findings within the past 24 hours and my  personal findings are:  NG in appropriate position

## 2017-10-17 NOTE — ASSESSMENT & PLAN NOTE
UOP decreased with worsening creatine 2.5  - likely ischemic ATN from decreased renal perfusion (volume shifts from paracentesis, anemia)  - urine sediment with muddy brown casts  - associated with non-anion gap metabolic acidosis and hyponatremia  - agree with holding diuretics  - Continue with NaBicarb supplementation to correct acidosis  - Continue octreotide for now   - consider midodrine if BP does not improve   - Will continue to lab check

## 2017-10-17 NOTE — ASSESSMENT & PLAN NOTE
· 10/15- trending down; holding lasix and stopping PPN; cont albumin    10/16- Na 122 today; starting patient on salt tabs; needs to get to 125 for liver transplant     10/17- Na is 125 today which is the acceptable range for a transplant; cont sodium tabs;

## 2017-10-17 NOTE — ASSESSMENT & PLAN NOTE
· Appreciate Hepatology assistance  · Continue Lactulose and Rifaximin for HE  · Currently not transplant candidate per Psych and Hep.  Addiction Psych consulted  · IR para 10/9- 7.8 L fluid removed negative for SBP  · Stressed the importance of high calorie diet to prevent NGT insertion again - Boost TID  · Patient agreed to due alcohol rehab if patient was to get a liver transplant; we have initiated inpatient liver transplant evaluation;   · Patient has been accepted for liver transplant and has been listed today and transferring to LTS    MELD-Na score: 35 at 10/17/2017  5:27 AM  MELD score: 32 at 10/17/2017  5:27 AM  Calculated from:  Serum Creatinine: 2.2 mg/dL at 10/17/2017  5:27 AM  Serum Sodium: 125 mmol/L at 10/17/2017  5:27 AM  Total Bilirubin: 33.3 mg/dL at 10/17/2017  5:27 AM  INR(ratio): 1.6 at 10/17/2017  5:27 AM  Age: 27 years

## 2017-10-17 NOTE — PROGRESS NOTES
[facility]  Nephrology  Progress Note    Patient Name: Jhonny Diana  MRN: [id number]  Admission Date: 9/27/2017  Hospital Length of Stay: 20 days  Attending Provider: Armand Carrasco MD   Primary Care Physician: Provider [facility]  Principal Problem:Alcoholic hepatitis with ascites    Subjective:     HPI: Jhonny Diana is a 26 yo male with pMHX of childhood asthma and alcohol abuse who was was transferred to [facility] for hepatology evaluation for worsening liver functions.  Information was obtained from family at bedside and review of outside records, given that patient is obtunded.  According to mother at bedside, patient has had multiple admissions to the hospital over the past 6 months for recurrent abdominal pain.  She reports that he has been been seen the ED and has been diagnosed with diverticulitis, UTIs, gastroenteritis, and cholecystitis.  He was never admitted to the hospital and was always discharged.  On 09/06, patient presented to OSH for worsening abdominal pain and distension.  He was diagnosed at that time with ETOH hepatitis.  He was treated with steroids, ceftriaxone for SBP prophylaxis, and diuresed at that time for volume management.  He was discharged home on the 15th, after clearance from GI,a nd was instructed to follow-up with his PCP 1 week after discharge.  He presented to the hospital on 09/25 for worsening abdominal pain and distension with 20 kg weight gain.  He was transferred to [facility] for further care and Nephrology was consulted for LAURA.    On review of outside records, patient Scr on admission was 1.5 (with baseline at 1.0).  According to the family at the bedside, he was not urinating well at the OSH, and not responding to diuretics (spirionolactone/lasix combo).  His mother reports that patient continued to c/o abdominal and inability to urinate, but no chicas was placed until transfer to [facility] yesterday.  Record reviews shows relative hypotension with BPs in the low 90's.   His mother reports that the nursing staff was administrating his narcotic regimen based on his blood pressures, and at times, medication was held due to hypotension.  Since admission to hospital, his UOP has improved, responding well in Lasix 40 IV (770 ml since admission, 700 ml so far this shift at time of consultation).  Admit labs revealed a SCr of 2.6, this morning improved down to 2.4.  Bilirubin remains elevated, >14.  Abdomen remains with tight distension, unable to perform paracentesis, as no significant fluid pocket found.  Mother reports patient did not have para's done at OSH.    Labs on the morning of 09/27 with bicarb of 15.  ABG with alkalemia and primary respiratory alkalosis and anion gap acidosis.     Interval History: NAEON.  Improved Urine output 1300cc.  In good spirits, reports he is listed for transplant.  Denies SOB. Good UOP.  Creatine down trending 2.2    Review of patient's allergies indicates:   Allergen Reactions    Bactrim [sulfamethoxazole-trimethoprim] Other (See Comments)     Mookie Trell Syndrome     Current Facility-Administered Medications   Medication Frequency    fluconazole tablet 200 mg Daily    hepatitis B virus vacc.rec(PF) injection 40 mcg vaccine x 1 dose    lactulose 20 gram/30 mL solution Soln 15 g TID    octreotide injection 100 mcg Q8H    ondansetron injection 4 mg Q4H PRN    pantoprazole EC tablet 40 mg BID AC    promethazine (PHENERGAN) 12.5 mg in dextrose 5 % 50 mL IVPB Q6H PRN    rifAXIMin tablet 550 mg BID    sodium bicarbonate tablet 1,950 mg TID    sodium chloride tablet 1 g QID    sucralfate 100 mg/mL suspension 1 g Q6H    tramadol tablet 50 mg Q4H PRN       Objective:     Vital Signs (Most Recent):  Temp: 99.2 °F (37.3 °C) (10/17/17 1131)  Pulse: 96 (10/17/17 1238)  Resp: 16 (10/17/17 1131)  BP: (!) 111/55 (10/17/17 1131)  SpO2: 95 % (10/17/17 1131)  O2 Device (Oxygen Therapy): nasal cannula (10/17/17 1131) Vital Signs (24h Range):  Temp:  [97.5  °F (36.4 °C)-99.2 °F (37.3 °C)] 99.2 °F (37.3 °C)  Pulse:  [] 96  Resp:  [15-18] 16  SpO2:  [95 %-98 %] 95 %  BP: (107-120)/(48-59) 111/55     Weight: 94.1 kg (207 lb 7.3 oz) (10/17/17 0430)  Body mass index is 32.49 kg/m².  Body surface area is 2.11 meters squared.    I/O last 3 completed shifts:  In: 2080 [P.O.:2080]  Out: 1230 [Urine:1230]    Physical Exam   Constitutional: He appears well-developed and well-nourished. No distress.   HENT:   Head: Normocephalic and atraumatic.   Eyes: EOM are normal. Scleral icterus is present.   Cardiovascular: Normal rate and regular rhythm.    Pulmonary/Chest: Effort normal and breath sounds normal.   Musculoskeletal: He exhibits edema. He exhibits no deformity.   Skin: Skin is warm and dry. He is not diaphoretic.   Psychiatric: He has a normal mood and affect. His behavior is normal.       Significant Labs:  CBC:     Recent Labs  Lab 10/17/17  0527   WBC 16.89*   RBC 2.27*   HGB 8.2*   HCT 22.8*   *   *   MCH 36.1*   MCHC 36.0     CMP:     Recent Labs  Lab 10/17/17  0527      CALCIUM 8.9   ALBUMIN 3.2*   PROT 6.0   *   K 3.5   CO2 16*   CL 93*   BUN 87*   CREATININE 2.2*   ALKPHOS 118   ALT 77*   *   BILITOT 33.3*     All labs within the past 24 hours have been reviewed.     Significant Imaging:  Labs: Reviewed    Assessment/Plan:     LAURA (acute kidney injury)    UOP improving, Creatine down trending.    - likely ischemic ATN from decreased renal perfusion (volume shifts from paracentesis, anemia)  - urine sediment with muddy brown casts  - sCr continues to rise; up to 2.4 today  - associated with non-anion gap metabolic acidosis and hyponatremia  - worsening presentation likely related to increase BMs (5 so far today). Recommend holding lactulose for rest of today  - agree with holding diuretics  - NaBicarb supplementation to correct acidosis  - Agree with blood transfusion  - Continue octreotide for now   - consider midodrine if BP  does not improve   - Will continue to lab check         Hyponatremia    - previously studies c/w increased ADH secretion in setting of cirrhosis; surprisingly serum osm was wnl. Will repeat today  - Na improved today 125m  - Can continue salt tablets 1g BID to increase Na levels prior to transplant  - as a last resort, can also consider conivaptan infusion (if no prior h/o varices) to ensure patient receives transplant  - will continue to monitor          Case discussed with fellow and staff, staff attestation to follow.  Thank you for your consult. I will follow-up with patient. Please contact us if you have any additional questions.    Anoop Nina MD  Nephrology  [facility]    I have reviewed and concur with the resident''s history, physical, assessment, and plan. I have personally interviewed and examined the patient at bedside.

## 2017-10-17 NOTE — PT/OT/SLP PROGRESS
Occupational Therapy      Jhonny Diana  MRN: [id number]    Patient not seen today secondary to Other (Comment) (OT attempted to see pt in PM. Pt not in room with RN reporting pt downstairs with mother recently. Unable to return later this date.). Will follow-up at next scheduled visit.    JULIETTE Cooper  10/17/2017

## 2017-10-17 NOTE — ASSESSMENT & PLAN NOTE
· Appreciate Nephro's help  · Suspect LAURA multifactorial: ischemic ATN given hypotension and injury due to hyperbilirubinemia. Felt less likely to be HRS however have continued albumin with lasix  · Overall, LAURA improving and making adequate urine.  Not measuring amounts as he pulls off condom catheter and is in diapers - but mother reports frequent wet diapers    10/12- renal function worsening, will re-consult nephrology; adding octreotide; strict I/O; decrease UOP    10/14- will transfuse 1 unit of PRBC to help perfusion and add back octreotide and albumin    10/16- Cr worsening today; will transfuse 1 unit of PRBC    10/17- Cr improved with holding diuretics and blood transfusion;

## 2017-10-17 NOTE — PLAN OF CARE
Problem: Patient Care Overview  Goal: Plan of Care Review  Outcome: Ongoing (interventions implemented as appropriate)  Pt remained free from injury during shift. AAOx4. VSS. Pt denies pain. Zofran given x1 for episode of nausea. Na 125 on labs. Continues on Na tabs. Pt worked with PT today. Ambulating with standby assistance. Appetite improving. Listed for liver 10/17. Contact precautions remain ongoing for ESBL in sputum. Mother remains at bedside. Call light in reach. Will continue to monitor.

## 2017-10-17 NOTE — ASSESSMENT & PLAN NOTE
UOP improving, Creatine down trending.    - likely ischemic ATN from decreased renal perfusion (volume shifts from paracentesis, anemia)  - urine sediment with muddy brown casts  - sCr continues to rise; up to 2.4 today  - associated with non-anion gap metabolic acidosis and hyponatremia  - worsening presentation likely related to increase BMs (5 so far today). Recommend holding lactulose for rest of today  - agree with holding diuretics  - NaBicarb supplementation to correct acidosis  - Agree with blood transfusion  - Continue octreotide for now   - consider midodrine if BP does not improve   - Will continue to lab check

## 2017-10-17 NOTE — PROGRESS NOTES
"[facility] Hospital Medicine  Progress Note    Patient Name: Jhonny Diana  MRN: [id number]  Patient Class: IP- Inpatient   Admission Date: 9/27/2017  Length of Stay: 20 days  Attending Physician: Solis Chapa MD  Primary Care Provider: Provider [facility] Medicine Team: [facility] LIVER TRANSPLANT Armand Carrasco MD    Subjective:     Principal Problem:Alcoholic hepatitis with ascites    HPI:  28 yo male with hx of childhood asthma and heavy alcohol abuse (fifth of liquor daily since teenager, last drink 2.5 wks ago per mother) who was transferred to [facility] for higher level of care/liver transplant evaluation. Patient initially presented to OSH for a 2 mo hx of progressively worsening abdominal pain with associated fatigue, jaundice, and abdominal distention with ~20# weight gain. HPI given by patient's mother and OSH records as patient's participation in interview limited by previous administration of pain medication at OSH.      Per mother, patient did not have a fever, chills, dark stools, known sick contacts, nausea, vomiting, diarrhea, confusion, or HA. Reports family hx of alcoholic cirrhosis in father. Pt was recently diagnosed with alcoholic hepatitis during his admission at OSH on 9/6-9/15. During that hospitalization, patient was given ceftriaxone for SBP ppx, prednisone for alcoholic hepatitis, and diuresed. Pt was discharged and instructed to follow up with PCP and AA within 1 wk. After his discharge, patient returned to ED 4 days after for increased weight gain and worsening abdominal pain/jaundice. At OSH, patient was given fluids (for suspected pancreatitis), diuresed and given IV albumin. Two days prior to transfer to [facility], patient had an episode of hematemesis and underwent an EGD on 9/25 that revealed "1 sm varix in distal esophagus, no stigmata of bleeding, gastropathy of portal hypertension, and reflux esophagitis".  No further episodes of hematemesis noted since initial " episode.      Recent labs at OSH: Na 134, CO2 18, BUN 36, Cr 2, Tprot 5.9, albumin 1.9, Tbili 24, alk phos 83, , ALT51. Patient also had lipase 532 and ammonia 118 during his previous hospitalization at OSH. Labwork also notable for negative mitochondrial ab, sm muscle ab, and ceruloplasmin 22. Imaging at OSH revealing abdo U/S (9/20): hepatosplenomegaly, CT abdo w contrast: hepatomegaly with marked fatty infiltration of the liver and mild ascites (9/6). No CDs available for review.    Hospital Course:  Mr. Diana was admitted to the CMICU on 9/27 for decompensated alcoholic hepatitis with high MELD score, severe HE, and LAURA. Hepatology consulted and following to evaluate liver transplant candidacy. Patient not currently a transplant candidate due to recent alcohol use; recommending re-evaluation by addiction psych and social work when more medically stable and able to participate. Pt started on albumin and lasix for LAURA and possibility for HRS. However nephrology felt that LAURA is likely multifactorial: ATN given hypotensive episodes at OSH and toxic given severe hyperbilirubinemia; not truly HRS. Lactulose/rifaximin started for HE and mental status improving slowly. Mr. Diana has had significant ABD pain since admission with ABD distension. CT abdomen was significant for pericolonic fat stranding of right colon; no obstruction. Also with diffuse mesenteric edema. No significant ascites noted, no cholecystitis/cholelithiasis/ductal dilation. US liver with doppler showed appropriate vasculature.  He was stepped down to Hospital Medicine on 10/5.  Paracentesis was done on 10/9 with 3.3L removed.  His diet was advanced and Dietary was consulted to help get adequate caloric intake.    Interval History: patient's sodium has finally reached 125; patient has been listed for liver transplant and will be transferred to LTS; service;     Review of Systems   Constitutional: Negative for chills, fatigue and fever.   HENT:  Negative for sore throat and trouble swallowing.    Eyes: Negative for photophobia and visual disturbance.   Respiratory: Negative for cough and shortness of breath.    Cardiovascular: Negative for chest pain, palpitations and leg swelling.   Gastrointestinal: Positive for abdominal distention. Negative for abdominal pain, constipation, diarrhea, nausea and vomiting.   Endocrine: Negative for cold intolerance and heat intolerance.   Genitourinary: Negative for dysuria and frequency.   Musculoskeletal: Negative for arthralgias and myalgias.   Skin: Positive for color change. Negative for rash and wound.   Neurological: Negative for dizziness, syncope, weakness and light-headedness.   Psychiatric/Behavioral: Negative for confusion and hallucinations.   All other systems reviewed and are negative.    Objective:     Vital Signs (Most Recent):  Temp: 98.8 °F (37.1 °C) (10/17/17 1705)  Pulse: 96 (10/17/17 1705)  Resp: 16 (10/17/17 1705)  BP: (!) 107/59 (10/17/17 1705)  SpO2: 96 % (10/17/17 1705) Vital Signs (24h Range):  Temp:  [98.1 °F (36.7 °C)-99.2 °F (37.3 °C)] 98.8 °F (37.1 °C)  Pulse:  [] 96  Resp:  [15-18] 16  SpO2:  [95 %-98 %] 96 %  BP: (107-120)/(48-59) 107/59     Weight: 94.1 kg (207 lb 7.3 oz)  Body mass index is 32.49 kg/m².    Intake/Output Summary (Last 24 hours) at 10/17/17 1818  Last data filed at 10/17/17 1700   Gross per 24 hour   Intake             1260 ml   Output             1220 ml   Net               40 ml      Physical Exam   Constitutional: He appears well-developed and well-nourished. He is cooperative.  Non-toxic appearance. No distress.   HENT:   Head: Normocephalic and atraumatic.   Mouth/Throat: Oropharynx is clear and moist.   Eyes: EOM are normal. Pupils are equal, round, and reactive to light. Scleral icterus is present.   Neck: Normal range of motion. Neck supple.   Cardiovascular: Regular rhythm, normal heart sounds and normal pulses.  Tachycardia present.    No murmur  heard.  Sinus tachycardia   Pulmonary/Chest: Effort normal. No respiratory distress. He has decreased breath sounds in the right lower field and the left lower field. He has no wheezes. He has no rales.   Abdominal: Soft. Normal appearance. He exhibits distension. He exhibits no mass. Bowel sounds are decreased. There is no tenderness. There is no rebound and no guarding. No hernia.   Musculoskeletal: Normal range of motion. He exhibits no edema.   Lymphadenopathy:     He has no cervical adenopathy.   Neurological: He is alert. He is not disoriented.   Oriented to person and place today   Skin: Skin is warm and dry. He is not diaphoretic.   Old blister/ wart on right plantar surface of the foot near the big toe   Psychiatric: He has a normal mood and affect. His behavior is normal. His affect is not labile. His speech is delayed. Cognition and memory are not impaired.   Nursing note and vitals reviewed.      Significant Labs:   CBC:     Recent Labs  Lab 10/16/17  0528 10/17/17  0527   WBC 15.92* 16.89*   HGB 6.9* 8.2*   HCT 18.6* 22.8*   * 146*     CMP:     Recent Labs  Lab 10/15/17  1929 10/16/17  0528 10/17/17  0527   * 122* 125*   K 3.8 3.5 3.5   CL 95 93* 93*   CO2 13* 14* 16*    103 109   BUN 80* 84* 87*   CREATININE 2.0* 2.4* 2.2*   CALCIUM 9.1 8.6* 8.9   PROT  --  6.1 6.0   ALBUMIN  --  3.3* 3.2*   BILITOT  --  31.3* 33.3*   ALKPHOS  --  107 118   AST  --  162* 188*   ALT  --  65* 77*   ANIONGAP 15 15 16   EGFRNONAA 44.1* 35.4* 39.3*     Coagulation:     Recent Labs  Lab 10/17/17  0527   INR 1.6*     All pertinent labs within the past 24 hours have been reviewed.    Significant Imaging: CXR: I have reviewed all pertinent results/findings within the past 24 hours and my personal findings are:  NG in appropriate position    Assessment/Plan:      * Alcoholic hepatitis with ascites    · Appreciate Hepatology assistance  · Continue Lactulose and Rifaximin for HE  · Currently not transplant  candidate per Psych and Hep.  Addiction Psych consulted  · IR para 10/9- 7.8 L fluid removed negative for SBP  · Stressed the importance of high calorie diet to prevent NGT insertion again - Boost TID  · Patient agreed to due alcohol rehab if patient was to get a liver transplant; we have initiated inpatient liver transplant evaluation;   · Patient has been accepted for liver transplant and has been listed today and transferring to LTS    MELD-Na score: 35 at 10/17/2017  5:27 AM  MELD score: 32 at 10/17/2017  5:27 AM  Calculated from:  Serum Creatinine: 2.2 mg/dL at 10/17/2017  5:27 AM  Serum Sodium: 125 mmol/L at 10/17/2017  5:27 AM  Total Bilirubin: 33.3 mg/dL at 10/17/2017  5:27 AM  INR(ratio): 1.6 at 10/17/2017  5:27 AM  Age: 27 years            Acute blood loss anemia    10/16- due to hemolysis; no evidence of overt GI bleeding; will transfuse 1 unit of PRBC today    10/17- H/H responded appropriately to the blood transfusion; monitor         Ascites due to alcoholic cirrhosis    As above    S/p paracentesis on 10/12 with 1.7 L removed; adding lasix         Severe malnutrition    · SLP consulted and ok for dental soft diet  · Dietary consulted  · Stressed the importance of eating high protein high calorie to prevent NGT re-insertion with TF  · Calorie counts and Boost TID  · Started patient on PPN on 10/11, continue     10/15- stopping PPN today        Coagulopathy    · 2/2 liver disease  · No bleeding; Monitor          Leukocytosis    · WBC remains elevated - stable at 26  · Remains afebrile  · Repeat blood cultures NGTD  · UA negative  · Resp cx ESBL Klebsiella.  ID following.  Will discuss antibiotics        Metabolic acidosis with normal anion gap and bicarbonate losses    · 2/2 diarrhea given lactulose.  Currently on 10g daily with improving BMs  · Still acidotic, will monitor  · Sodium Bicarb TID          Decompensated hepatic cirrhosis with ascites and HE    See alcoholic hepatitis with ascites           Anasarca    · 2/2 liver disease and hypoalbuminemia          Hyponatremia    · 10/15- trending down; holding lasix and stopping PPN; cont albumin    10/16- Na 122 today; starting patient on salt tabs; needs to get to 125 for liver transplant     10/17- Na is 125 today which is the acceptable range for a transplant; cont sodium tabs;             History of hematemesis    · Pt had an episode of hematemesis at OSH and EGD that revealed 1 small varix, reflux esophagitis, and gastropathy of portal hypertension  · No evidence of bleeding currently and relatively stable hemgolobin  · Continue Pantoprazole BID        Acute renal failure with tubular necrosis    · Appreciate Nephro's help  · Suspect LAURA multifactorial: ischemic ATN given hypotension and injury due to hyperbilirubinemia. Felt less likely to be HRS however have continued albumin with lasix  · Overall, LAURA improving and making adequate urine.  Not measuring amounts as he pulls off condom catheter and is in diapers - but mother reports frequent wet diapers    10/12- renal function worsening, will re-consult nephrology; adding octreotide; strict I/O; decrease UOP    10/14- will transfuse 1 unit of PRBC to help perfusion and add back octreotide and albumin    10/16- Cr worsening today; will transfuse 1 unit of PRBC    10/17- Cr improved with holding diuretics and blood transfusion;         Hepatic encephalopathy    · Mentation improving  · Continue Lactulose and Rifaxmin    10/10 - mentation AAO times 4; only one BM today; will schedule lactulose today    10/14- titrate down lactulose to only have 3 to 4 BMS daily          Alcoholism /alcohol abuse    · Reported about a fifth of whiskey daily since teenager  · Appreciate Psych and Hep assistance; currently high risk for liver transplant.  Addiction Psych reconsulted, state prognosis is gaurded however would need ABU if transplant is need urgently which patient has agreed to do  · PETH 174 on admit, 9/27           VTE Risk Mitigation         Ordered     Place sequential compression device  Until discontinued      09/28/17 4008     Medium Risk of VTE  Once      09/27/17 7154              Armand Carrasco MD  Department of Hospital Medicine   [facility]

## 2017-10-17 NOTE — ASSESSMENT & PLAN NOTE
- previously studies c/w increased ADH secretion in setting of cirrhosis; surprisingly serum osm was wnl. Serum osm still pending  - Na improved today 126  - Consider stopping salt tabs given worsening ascites  - will continue to monitor

## 2017-10-17 NOTE — PLAN OF CARE
Problem: Patient Care Overview  Goal: Plan of Care Review  Outcome: Ongoing (interventions implemented as appropriate)  Pt's VSS, AAOX3, and independent with stand by assistance-ambulated in hallway with PT. Critical H/H levels of 6.9/18.6-1 unit of RBCs given and tolerated well. Mid-line dressing changed. C/O of nausea-gave Zofran x1. C/O of abdominal pain- gave Ultram X1.Collected urine sample for electrolytes.  Had 6 BMs. Telemetry monitoring in place. Glucose monitoring performed and treated as ordered. Pt's bed in lowest position, call bell within reach, and non skid socks on. Pt's mother at bedside.

## 2017-10-17 NOTE — PLAN OF CARE
Problem: Patient Care Overview  Goal: Plan of Care Review  Outcome: Ongoing (interventions implemented as appropriate)  Pt is AAOx4, free from fall/injury so far this shift, in bed wearing non-skid footwear, bed in low/locked position, commode at bedside, call bell next to pt, mother at bedside. Pt VSS, no tele SR currently in the 90's. Pt pain managed c ordered tramadol PO. Pt is afebrile at this time. Proper hand hygiene performed before and after pt care activities. Pt on contact precautions for ESBL in the sputum, maintained during pt care. Pt accepted for Ltxp and approved for listing, plan to switch over to LTS td. Pt received unit of blood on day shift yd 2/2 low H/H. Urine sample sent off yd to check electrolyte levels, see results for details. Pt reminded to use call bell to call for assistance, pt verbalizes understanding. Will continue to monitor.

## 2017-10-17 NOTE — PLAN OF CARE
Problem: Physical Therapy Goal  Goal: Physical Therapy Goal  Goals to be met by: 10/16/2017    Patient will increase functional independence with mobility by performin. Supine to sit with Mod I.   2. Sit to supine with mod I.  3. Sit to stand transfer with SBA using LRAD or no AD.  4. Bed to chair transfer with SBA using LRAD  5. Gait x150 feet with SBA using LRAD or no AD.  6. Ascend/descend 4 stair with bilateral Handrails with CGA.  7. Lower extremity exercise program x20 reps per handout, with independence           Outcome: Ongoing (interventions implemented as appropriate)  Pt progressing towards goals.

## 2017-10-18 ENCOUNTER — TELEPHONE (OUTPATIENT)
Dept: TRANSPLANT | Facility: CLINIC | Age: 27
End: 2017-10-18

## 2017-10-18 LAB
ABO + RH BLD: NORMAL
ALBUMIN SERPL BCP-MCNC: 2.8 G/DL
ALBUMIN SERPL BCP-MCNC: 2.9 G/DL
ALBUMIN SERPL BCP-MCNC: 2.9 G/DL
ALBUMIN SERPL BCP-MCNC: 3 G/DL
ALP SERPL-CCNC: 123 U/L
ALP SERPL-CCNC: 125 U/L
ALT SERPL W/O P-5'-P-CCNC: 77 U/L
ALT SERPL W/O P-5'-P-CCNC: 78 U/L
ANION GAP SERPL CALC-SCNC: 15 MMOL/L
ANION GAP SERPL CALC-SCNC: 15 MMOL/L
ANION GAP SERPL CALC-SCNC: 16 MMOL/L
ANION GAP SERPL CALC-SCNC: 16 MMOL/L
APTT BLDCRRT: 38.6 SEC
AST SERPL-CCNC: 174 U/L
AST SERPL-CCNC: 176 U/L
BACTERIA BLD CULT: NORMAL
BASOPHILS # BLD AUTO: 0.05 K/UL
BASOPHILS # BLD AUTO: 0.06 K/UL
BASOPHILS NFR BLD: 0.3 %
BASOPHILS NFR BLD: 0.4 %
BILIRUB DIRECT SERPL-MCNC: >14 MG/DL
BILIRUB SERPL-MCNC: 33.1 MG/DL
BILIRUB SERPL-MCNC: 33.2 MG/DL
BILIRUB UR QL STRIP: ABNORMAL
BLD GP AB SCN CELLS X3 SERPL QL: NORMAL
BUN SERPL-MCNC: 85 MG/DL
BUN SERPL-MCNC: 86 MG/DL
BUN SERPL-MCNC: 87 MG/DL
BUN SERPL-MCNC: 88 MG/DL
CALCIUM SERPL-MCNC: 8.8 MG/DL
CALCIUM SERPL-MCNC: 9 MG/DL
CHLORIDE SERPL-SCNC: 95 MMOL/L
CHLORIDE SERPL-SCNC: 95 MMOL/L
CHLORIDE SERPL-SCNC: 97 MMOL/L
CHLORIDE SERPL-SCNC: 99 MMOL/L
CLARITY UR REFRACT.AUTO: CLEAR
CO2 SERPL-SCNC: 16 MMOL/L
CO2 SERPL-SCNC: 16 MMOL/L
CO2 SERPL-SCNC: 17 MMOL/L
CO2 SERPL-SCNC: 18 MMOL/L
COLOR UR AUTO: ABNORMAL
CREAT SERPL-MCNC: 2.4 MG/DL
CREAT SERPL-MCNC: 2.5 MG/DL
CREAT SERPL-MCNC: 2.5 MG/DL
CREAT SERPL-MCNC: 2.6 MG/DL
DIFFERENTIAL METHOD: ABNORMAL
DIFFERENTIAL METHOD: ABNORMAL
EOSINOPHIL # BLD AUTO: 0.1 K/UL
EOSINOPHIL # BLD AUTO: 0.2 K/UL
EOSINOPHIL NFR BLD: 0.9 %
EOSINOPHIL NFR BLD: 1 %
ERYTHROCYTE [DISTWIDTH] IN BLOOD BY AUTOMATED COUNT: 18.6 %
ERYTHROCYTE [DISTWIDTH] IN BLOOD BY AUTOMATED COUNT: 18.6 %
EST. GFR  (AFRICAN AMERICAN): 37.1 ML/MIN/1.73 M^2
EST. GFR  (AFRICAN AMERICAN): 38.9 ML/MIN/1.73 M^2
EST. GFR  (AFRICAN AMERICAN): 38.9 ML/MIN/1.73 M^2
EST. GFR  (AFRICAN AMERICAN): 40.9 ML/MIN/1.73 M^2
EST. GFR  (NON AFRICAN AMERICAN): 32.1 ML/MIN/1.73 M^2
EST. GFR  (NON AFRICAN AMERICAN): 33.7 ML/MIN/1.73 M^2
EST. GFR  (NON AFRICAN AMERICAN): 33.7 ML/MIN/1.73 M^2
EST. GFR  (NON AFRICAN AMERICAN): 35.4 ML/MIN/1.73 M^2
ESTIMATED AVG GLUCOSE: ABNORMAL MG/DL
FIBRINOGEN PPP-MCNC: 258 MG/DL
GLUCOSE SERPL-MCNC: 112 MG/DL
GLUCOSE SERPL-MCNC: 117 MG/DL
GLUCOSE SERPL-MCNC: 118 MG/DL
GLUCOSE SERPL-MCNC: 122 MG/DL
GLUCOSE UR QL STRIP: NEGATIVE
HBA1C MFR BLD HPLC: <4 %
HBV SURFACE AG SERPL QL IA: NEGATIVE
HCT VFR BLD AUTO: 20.9 %
HCT VFR BLD AUTO: 21.1 %
HGB BLD-MCNC: 7.6 G/DL
HGB BLD-MCNC: 7.7 G/DL
HGB UR QL STRIP: NEGATIVE
HIV 1+2 AB+HIV1 P24 AG SERPL QL IA: NEGATIVE
IMM GRANULOCYTES # BLD AUTO: 0.09 K/UL
IMM GRANULOCYTES # BLD AUTO: 0.11 K/UL
IMM GRANULOCYTES NFR BLD AUTO: 0.6 %
IMM GRANULOCYTES NFR BLD AUTO: 0.7 %
INR PPP: 1.7
INR PPP: 1.7
KETONES UR QL STRIP: NEGATIVE
LEUKOCYTE ESTERASE UR QL STRIP: NEGATIVE
LYMPHOCYTES # BLD AUTO: 1.2 K/UL
LYMPHOCYTES # BLD AUTO: 1.2 K/UL
LYMPHOCYTES NFR BLD: 7.6 %
LYMPHOCYTES NFR BLD: 8 %
MAGNESIUM SERPL-MCNC: 2.1 MG/DL
MCH RBC QN AUTO: 35.3 PG
MCH RBC QN AUTO: 35.6 PG
MCHC RBC AUTO-ENTMCNC: 36.4 G/DL
MCHC RBC AUTO-ENTMCNC: 36.5 G/DL
MCV RBC AUTO: 97 FL
MCV RBC AUTO: 98 FL
MONOCYTES # BLD AUTO: 1.7 K/UL
MONOCYTES # BLD AUTO: 1.8 K/UL
MONOCYTES NFR BLD: 10.9 %
MONOCYTES NFR BLD: 11.6 %
NEUTROPHILS # BLD AUTO: 12 K/UL
NEUTROPHILS # BLD AUTO: 12.2 K/UL
NEUTROPHILS NFR BLD: 78.7 %
NEUTROPHILS NFR BLD: 79.3 %
NITRITE UR QL STRIP: NEGATIVE
NRBC BLD-RTO: 0 /100 WBC
NRBC BLD-RTO: 0 /100 WBC
PH UR STRIP: 5 [PH] (ref 5–8)
PHOSPHATE SERPL-MCNC: 3.6 MG/DL
PHOSPHATE SERPL-MCNC: 3.6 MG/DL
PHOSPHATE SERPL-MCNC: 3.8 MG/DL
PLATELET # BLD AUTO: 145 K/UL
PLATELET # BLD AUTO: 154 K/UL
PMV BLD AUTO: 12 FL
PMV BLD AUTO: 12.2 FL
POTASSIUM SERPL-SCNC: 2.9 MMOL/L
POTASSIUM SERPL-SCNC: 3.2 MMOL/L
POTASSIUM SERPL-SCNC: 3.2 MMOL/L
POTASSIUM SERPL-SCNC: 3.3 MMOL/L
PROT SERPL-MCNC: 5.7 G/DL
PROT SERPL-MCNC: 5.7 G/DL
PROT UR QL STRIP: NEGATIVE
PROTHROMBIN TIME: 17.1 SEC
PROTHROMBIN TIME: 17.2 SEC
RBC # BLD AUTO: 2.15 M/UL
RBC # BLD AUTO: 2.16 M/UL
SODIUM SERPL-SCNC: 127 MMOL/L
SODIUM SERPL-SCNC: 128 MMOL/L
SODIUM SERPL-SCNC: 128 MMOL/L
SODIUM SERPL-SCNC: 132 MMOL/L
SP GR UR STRIP: 1.01 (ref 1–1.03)
URN SPEC COLLECT METH UR: ABNORMAL
UROBILINOGEN UR STRIP-ACNC: NEGATIVE EU/DL
WBC # BLD AUTO: 15.17 K/UL
WBC # BLD AUTO: 15.48 K/UL

## 2017-10-18 PROCEDURE — 80053 COMPREHEN METABOLIC PANEL: CPT | Mod: 91,NTX

## 2017-10-18 PROCEDURE — 87086 URINE CULTURE/COLONY COUNT: CPT | Mod: NTX

## 2017-10-18 PROCEDURE — 85384 FIBRINOGEN ACTIVITY: CPT | Mod: NTX

## 2017-10-18 PROCEDURE — 80069 RENAL FUNCTION PANEL: CPT | Mod: NTX

## 2017-10-18 PROCEDURE — 82248 BILIRUBIN DIRECT: CPT | Mod: NTX

## 2017-10-18 PROCEDURE — 93005 ELECTROCARDIOGRAM TRACING: CPT | Mod: NTX

## 2017-10-18 PROCEDURE — 86920 COMPATIBILITY TEST SPIN: CPT | Mod: NTX

## 2017-10-18 PROCEDURE — 25000003 PHARM REV CODE 250: Mod: NTX | Performed by: STUDENT IN AN ORGANIZED HEALTH CARE EDUCATION/TRAINING PROGRAM

## 2017-10-18 PROCEDURE — 81003 URINALYSIS AUTO W/O SCOPE: CPT | Mod: NTX

## 2017-10-18 PROCEDURE — 83735 ASSAY OF MAGNESIUM: CPT | Mod: 91,NTX

## 2017-10-18 PROCEDURE — 20600001 HC STEP DOWN PRIVATE ROOM: Mod: NTX

## 2017-10-18 PROCEDURE — 85610 PROTHROMBIN TIME: CPT | Mod: NTX

## 2017-10-18 PROCEDURE — 87522 HEPATITIS C REVRS TRNSCRPJ: CPT | Mod: NTX

## 2017-10-18 PROCEDURE — 27201236 HC CRRT SET UP & CANCELED: Mod: NTX

## 2017-10-18 PROCEDURE — 25000003 PHARM REV CODE 250: Mod: NTX | Performed by: HOSPITALIST

## 2017-10-18 PROCEDURE — 85610 PROTHROMBIN TIME: CPT | Mod: 91,NTX

## 2017-10-18 PROCEDURE — 87517 HEPATITIS B DNA QUANT: CPT | Mod: NTX

## 2017-10-18 PROCEDURE — 25000003 PHARM REV CODE 250: Mod: NTX | Performed by: NURSE PRACTITIONER

## 2017-10-18 PROCEDURE — 87536 HIV-1 QUANT&REVRSE TRNSCRPJ: CPT | Mod: NTX

## 2017-10-18 PROCEDURE — 25000003 PHARM REV CODE 250: Mod: NTX | Performed by: PHYSICIAN ASSISTANT

## 2017-10-18 PROCEDURE — 85025 COMPLETE CBC W/AUTO DIFF WBC: CPT | Mod: NTX

## 2017-10-18 PROCEDURE — 86703 HIV-1/HIV-2 1 RESULT ANTBDY: CPT | Mod: NTX

## 2017-10-18 PROCEDURE — 86850 RBC ANTIBODY SCREEN: CPT | Mod: NTX

## 2017-10-18 PROCEDURE — 36415 COLL VENOUS BLD VENIPUNCTURE: CPT | Mod: NTX

## 2017-10-18 PROCEDURE — 85730 THROMBOPLASTIN TIME PARTIAL: CPT | Mod: NTX

## 2017-10-18 PROCEDURE — 83735 ASSAY OF MAGNESIUM: CPT | Mod: NTX

## 2017-10-18 PROCEDURE — 99499 UNLISTED E&M SERVICE: CPT | Mod: ,,, | Performed by: TRANSPLANT SURGERY

## 2017-10-18 PROCEDURE — 86900 BLOOD TYPING SEROLOGIC ABO: CPT | Mod: NTX

## 2017-10-18 PROCEDURE — 25000003 PHARM REV CODE 250: Mod: NTX | Performed by: INTERNAL MEDICINE

## 2017-10-18 PROCEDURE — 84100 ASSAY OF PHOSPHORUS: CPT | Mod: NTX

## 2017-10-18 PROCEDURE — 63600175 PHARM REV CODE 636 W HCPCS: Mod: NTX | Performed by: HOSPITALIST

## 2017-10-18 PROCEDURE — 87340 HEPATITIS B SURFACE AG IA: CPT | Mod: NTX

## 2017-10-18 PROCEDURE — 99233 SBSQ HOSP IP/OBS HIGH 50: CPT | Mod: NTX,,, | Performed by: NURSE PRACTITIONER

## 2017-10-18 PROCEDURE — 83036 HEMOGLOBIN GLYCOSYLATED A1C: CPT | Mod: NTX

## 2017-10-18 PROCEDURE — 80053 COMPREHEN METABOLIC PANEL: CPT | Mod: NTX

## 2017-10-18 PROCEDURE — 93010 ELECTROCARDIOGRAM REPORT: CPT | Mod: NTX,,, | Performed by: INTERNAL MEDICINE

## 2017-10-18 RX ORDER — MAGNESIUM SULFATE HEPTAHYDRATE 40 MG/ML
2 INJECTION, SOLUTION INTRAVENOUS
Status: ACTIVE | OUTPATIENT
Start: 2017-10-18 | End: 2017-10-19

## 2017-10-18 RX ORDER — POTASSIUM CHLORIDE 20 MEQ/1
40 TABLET, EXTENDED RELEASE ORAL ONCE
Status: COMPLETED | OUTPATIENT
Start: 2017-10-18 | End: 2017-10-18

## 2017-10-18 RX ORDER — HYDROCODONE BITARTRATE AND ACETAMINOPHEN 500; 5 MG/1; MG/1
TABLET ORAL
Status: DISCONTINUED | OUTPATIENT
Start: 2017-10-18 | End: 2017-10-20

## 2017-10-18 RX ORDER — MEROPENEM AND SODIUM CHLORIDE 1 G/50ML
1 INJECTION, SOLUTION INTRAVENOUS
Status: DISCONTINUED | OUTPATIENT
Start: 2017-10-18 | End: 2017-10-20

## 2017-10-18 RX ORDER — HYDROCODONE BITARTRATE AND ACETAMINOPHEN 500; 5 MG/1; MG/1
TABLET ORAL
Status: ACTIVE | OUTPATIENT
Start: 2017-10-18 | End: 2017-10-19

## 2017-10-18 RX ORDER — METHYLPREDNISOLONE SODIUM SUCCINATE 500 MG/8ML
500 INJECTION INTRAMUSCULAR; INTRAVENOUS ONCE
Status: COMPLETED | OUTPATIENT
Start: 2017-10-18 | End: 2017-10-19

## 2017-10-18 RX ORDER — LACTULOSE 10 G/15ML
20 SOLUTION ORAL 3 TIMES DAILY
Status: DISCONTINUED | OUTPATIENT
Start: 2017-10-18 | End: 2017-10-18

## 2017-10-18 RX ORDER — MUPIROCIN 20 MG/G
OINTMENT TOPICAL
Status: DISCONTINUED | OUTPATIENT
Start: 2017-10-18 | End: 2017-10-20 | Stop reason: HOSPADM

## 2017-10-18 RX ADMIN — SUCRALFATE 1 G: 1 SUSPENSION ORAL at 12:10

## 2017-10-18 RX ADMIN — OCTREOTIDE ACETATE 100 MCG: 100 INJECTION, SOLUTION INTRAVENOUS; SUBCUTANEOUS at 04:10

## 2017-10-18 RX ADMIN — SODIUM BICARBONATE 650 MG TABLET 1950 MG: at 02:10

## 2017-10-18 RX ADMIN — FLUCONAZOLE 200 MG: 200 TABLET ORAL at 09:10

## 2017-10-18 RX ADMIN — POTASSIUM CHLORIDE 40 MEQ: 1500 TABLET, EXTENDED RELEASE ORAL at 10:10

## 2017-10-18 RX ADMIN — SODIUM CHLORIDE TAB 1 GM 1 G: 1 TAB at 06:10

## 2017-10-18 RX ADMIN — LACTULOSE 20 G: 20 SOLUTION ORAL at 04:10

## 2017-10-18 RX ADMIN — RIFAXIMIN 550 MG: 550 TABLET ORAL at 09:10

## 2017-10-18 RX ADMIN — SODIUM BICARBONATE 650 MG TABLET 1950 MG: at 04:10

## 2017-10-18 RX ADMIN — SUCRALFATE 1 G: 1 SUSPENSION ORAL at 06:10

## 2017-10-18 RX ADMIN — SODIUM CHLORIDE TAB 1 GM 1 G: 1 TAB at 12:10

## 2017-10-18 RX ADMIN — SUCRALFATE 1 G: 1 SUSPENSION ORAL at 04:10

## 2017-10-18 RX ADMIN — TRAMADOL HYDROCHLORIDE 25 MG: 50 TABLET, FILM COATED ORAL at 04:10

## 2017-10-18 RX ADMIN — OCTREOTIDE ACETATE 100 MCG: 100 INJECTION, SOLUTION INTRAVENOUS; SUBCUTANEOUS at 02:10

## 2017-10-18 RX ADMIN — PANTOPRAZOLE SODIUM 40 MG: 40 TABLET, DELAYED RELEASE ORAL at 04:10

## 2017-10-18 NOTE — PLAN OF CARE
Problem: Patient Care Overview  Goal: Plan of Care Review  Outcome: Ongoing (interventions implemented as appropriate)  AAOx3, afebrile, c/o pain x1 to abd per RIMA Gongora stated would place x1 order of ultram. Pt in lowest postion, side rails up x2, non skid footwear in place, call light within reach, pt verbalized understanding to call the nurse when needed. Pt able to reposition self independently. Hand hygiene practiced per protocol. Will continue to monitor.

## 2017-10-18 NOTE — ASSESSMENT & PLAN NOTE
- Paracentesis 10/9 with 3.3L off and 10/12 with 1.8L off. Fluid non-infectious.  - holding diuretics 2/2 hyponatremia and LAURA.  - if not transplanted, will consider paracentesis by the end of the week.

## 2017-10-18 NOTE — TELEPHONE ENCOUNTER
PATIENT NAME: Jhonny Diana  [facility] #: [id number]    Lab Results   Component Value Date    CREATININE 2.5 (H) 10/18/2017     (L) 10/18/2017    BILITOT 33.2 (H) 10/18/2017    ALBUMIN 2.9 (L) 10/18/2017    INR 1.7 (H) 10/18/2017     MELD 36  Encephalopathy: 1 - 2  Ascites: slight  Dialysis: no     Re certification form sent to  10/18/2017 at 10:49 AM.    Recertification requestor: Mago Poe

## 2017-10-18 NOTE — ASSESSMENT & PLAN NOTE
- nephrology following, suspect LAURA multifactorial: ischemic ATN given hypotension and injury due to hyperbilirubinemia. Felt less likely to be HRS  - Cr worsened today, good uop. Holding diuretics. Will likely need intra-op HD for transplant surgery.

## 2017-10-18 NOTE — PLAN OF CARE
"Problem: Patient Care Overview  Goal: Plan of Care Review  Outcome: Ongoing (interventions implemented as appropriate)  Pt remained free from injury during shift. VSS. AAOx4. Pt more tired today than yesterday. Remained in bed during shift. Reported "all I want to do today is sleep." 4BMs today. Pt to have liver transplant this evening. Remained NPO during shift. All consents signed. CXR, EKG, urine specimen, and labwork completed. Mother at bedside. Call light in reach. Will continue to monitor.       "

## 2017-10-18 NOTE — ASSESSMENT & PLAN NOTE
- listed for liver transplant 10/17 with MELD 35. MELD 36 today. Liver txp offered.     MELD-Na score: 36 at 10/18/2017  5:10 AM  MELD score: 34 at 10/18/2017  5:10 AM  Calculated from:  Serum Creatinine: 2.5 mg/dL at 10/18/2017  5:10 AM  Serum Sodium: 127 mmol/L at 10/18/2017  5:10 AM  Total Bilirubin: 33.2 mg/dL at 10/18/2017  5:10 AM  INR(ratio): 1.7 at 10/18/2017  5:10 AM  Age: 27 years

## 2017-10-18 NOTE — PROGRESS NOTES
Pre-operative Discussion Note  Liver Transplant Surgery    Jhonny Diana is a 27 y.o. male admitted for liver transplant.  I discussed the planned procedure in detail, including expected hospital course and outcomes, benefits, risks, and potential complications.  Complications discussed included death, graft failure, bleeding, infection, rejection, and neurologic problems.  I discussed the risks of anesthesia, as well as the potential need for re-operation.  The possibility of other complications not specifically mentioned was also discussed.  Also, I discussed the need for lifelong immunosuppression and the possibility of serious complications from immunosuppressive drugs.    The discussion included the risks that the patient will incur if he elects to not have the proposed procedure.    Relevant donor-specific risk factors were disclosed and discussed with the patient, including:   PHS: I discussed the use of organs from donors with [facility] increased-risk behavior, including the testing protocols utilized, as well as data from the literature regarding the likelihood of transmission of hepatitis or HIV.  The patient is willing to consider such grafts.    Specific [facility] Increased Risk Behavior criteria for the organ donor include:  People who have had sex with a person known or suspected to have HIV, HBV, or HCV infections in the preceding 12 months    HCV Recurrence: Not applicable.    Hepatocellular Carcinoma Recurrence: Not applicable.    All questions were answered.  The patient and available family members voice understanding and agree to proceed with the transplant.    UNOS Patient Status  Note on scores:  ICU = 10 = total assistance  TSU = 20-30 = partial assistance  Outpatient admitted for transplant requiring medical care in last year = 40-50 = partial assistance  Scores 60 or higher indicate no assistance, meaning no need for medical care in last year. This would be very unusual for a transplant  candidate.    Functional Status: 20% - Very sick, hospitalization necessary: active treatment necessary  Physical Capacity: Limited Mobility

## 2017-10-18 NOTE — PROGRESS NOTES
[facility]  Nephrology  Progress Note    Patient Name: Jhonny Diana  MRN: [id number]  Admission Date: 9/27/2017  Hospital Length of Stay: 21 days  Attending Provider: Solis Chapa MD   Primary Care Physician: Provider Notinsystem  Principal Problem:Alcoholic hepatitis with ascites    Subjective:     HPI: Jhonny Diana is a 26 yo male with pMHX of childhood asthma and alcohol abuse who was was transferred to [facility] for hepatology evaluation for worsening liver functions.  Information was obtained from family at bedside and review of outside records, given that patient is obtunded.  According to mother at bedside, patient has had multiple admissions to the hospital over the past 6 months for recurrent abdominal pain.  She reports that he has been been seen the ED and has been diagnosed with diverticulitis, UTIs, gastroenteritis, and cholecystitis.  He was never admitted to the hospital and was always discharged.  On 09/06, patient presented to OSH for worsening abdominal pain and distension.  He was diagnosed at that time with ETOH hepatitis.  He was treated with steroids, ceftriaxone for SBP prophylaxis, and diuresed at that time for volume management.  He was discharged home on the 15th, after clearance from GI,a nd was instructed to follow-up with his PCP 1 week after discharge.  He presented to the hospital on 09/25 for worsening abdominal pain and distension with 20 kg weight gain.  He was transferred to [facility] for further care and Nephrology was consulted for LAURA.    On review of outside records, patient Scr on admission was 1.5 (with baseline at 1.0).  According to the family at the bedside, he was not urinating well at the OSH, and not responding to diuretics (spirionolactone/lasix combo).  His mother reports that patient continued to c/o abdominal and inability to urinate, but no chicas was placed until transfer to [facility] yesterday.  Record reviews shows relative hypotension with BPs in the low 90's.   His mother reports that the nursing staff was administrating his narcotic regimen based on his blood pressures, and at times, medication was held due to hypotension.  Since admission to hospital, his UOP has improved, responding well in Lasix 40 IV (770 ml since admission, 700 ml so far this shift at time of consultation).  Admit labs revealed a SCr of 2.6, this morning improved down to 2.4.  Bilirubin remains elevated, >14.  Abdomen remains with tight distension, unable to perform paracentesis, as no significant fluid pocket found.  Mother reports patient did not have para's done at OSH.    Labs on the morning of 09/27 with bicarb of 15.  ABG with alkalemia and primary respiratory alkalosis and anion gap acidosis.     Interval History: NAEON.  Lactulose held yesterday, decreased BM and worsening lethargy but still alert and oriented.  Creatine bump to 2.5 UOP down to 900 cc per 24hr.  Continues to complain of abdominal distention and trouble breathing but otherwise reports he is the same.       Review of patient's allergies indicates:   Allergen Reactions    Bactrim [sulfamethoxazole-trimethoprim] Other (See Comments)     Mookie Trell Syndrome     Current Facility-Administered Medications   Medication Frequency    fluconazole tablet 200 mg Daily    hepatitis B virus vacc.rec(PF) injection 40 mcg vaccine x 1 dose    lactulose 20 gram/30 mL solution Soln 20 g TID    octreotide injection 100 mcg Q8H    ondansetron injection 4 mg Q4H PRN    pantoprazole EC tablet 40 mg BID AC    rifAXIMin tablet 550 mg BID    sodium bicarbonate tablet 1,950 mg TID    sodium chloride tablet 1 g QID    sucralfate 100 mg/mL suspension 1 g Q6H       Objective:     Vital Signs (Most Recent):  Temp: 98.4 °F (36.9 °C) (10/18/17 0738)  Pulse: 100 (10/18/17 0907)  Resp: 17 (10/18/17 0738)  BP: (!) 116/52 (10/18/17 0740)  SpO2: 95 % (10/18/17 0738)  O2 Device (Oxygen Therapy): room air (10/18/17 0738) Vital Signs (24h Range):  Temp:   [98.3 °F (36.8 °C)-99.2 °F (37.3 °C)] 98.4 °F (36.9 °C)  Pulse:  [] 100  Resp:  [16-19] 17  SpO2:  [94 %-100 %] 95 %  BP: (107-117)/(49-59) 116/52     Weight: 96.5 kg (212 lb 12.8 oz) (10/18/17 0400)  Body mass index is 33.33 kg/m².  Body surface area is 2.14 meters squared.    I/O last 3 completed shifts:  In: 1620 [P.O.:1620]  Out: 1370 [Urine:1370]    Physical Exam   Constitutional: He is oriented to person, place, and time. He appears well-developed and well-nourished. No distress.   HENT:   Head: Normocephalic and atraumatic.   Eyes: EOM are normal. Scleral icterus is present.   Cardiovascular: Normal rate and regular rhythm.    Pulmonary/Chest: Effort normal and breath sounds normal.   Abdominal: He exhibits distension. There is no tenderness.   Musculoskeletal: He exhibits edema. He exhibits no deformity.   Neurological: He is alert and oriented to person, place, and time.   Skin: Skin is warm and dry. He is not diaphoretic.   Psychiatric: He has a normal mood and affect. His behavior is normal.       Significant Labs:  All labs within the past 24 hours have been reviewed.   Creatine bump to 2.5 from 2.2, BUN stable at 86   Sodium improved to 126     Significant Imaging:  Labs: Reviewed  X-Ray: Reviewed    Assessment/Plan:     LAURA (acute kidney injury)    UOP decreased with worsening creatine 2.5  - likely ischemic ATN from decreased renal perfusion (volume shifts from paracentesis, anemia)  - urine sediment with muddy brown casts  - associated with non-anion gap metabolic acidosis and hyponatremia  - agree with holding diuretics  - Continue with NaBicarb supplementation to correct acidosis  - Continue octreotide for now   - consider midodrine if BP does not improve   - Will continue to lab check         Hyponatremia    - previously studies c/w increased ADH secretion in setting of cirrhosis; surprisingly serum osm was wnl. Serum osm still pending  - Na improved today 127  - Consider stopping salt tabs  given worsening ascites  - will continue to monitor          Will discuss case with fellow and staff, Attestation to follow   Thank you for your consult. I will follow-up with patient. Please contact us if you have any additional questions.    Anoop Nina MD   PGY 2 IM   Nephrology  [facility]

## 2017-10-18 NOTE — TELEPHONE ENCOUNTER
"10/18/17 10:20 AM :[facility] INCREASED RISK BEHAVIOR DONOR ORGAN OFFER NOTE    Notified by Gaurav Perez, , of liver offer with donor information.  Donor and recipient information read back and verified.  Spoke with Jhonny Diana and identified no acute medical issues during telephone assessment.  Patient instructed NPO and to stop beta blockers.      The donor's reported social history includes P[facility] increased risk behavior. Based on actual UNOS data, all donor organs carry a very small risk of transmission of HIV, hepatitis B, or hepatitis C despite negative testing. According to PHS, the estimated risk of seroconversion is 1 in 2,000 for "increased-risk donors" compared to 1 in 10,000 for other donors.  The risk of clinical illness from any transmitted infection is much less than this due to the availability highly effective oral drugs for all three of these viruses. While the patient has the right to decline any organ for any reason, available scientific data do not support turning down an organ based on behavioral risk factors as the risks associated with waiting longer for the transplant are many times greater. Informed patient that Dr. Martins thinks that this is a good liver for the patient.     Patient was given an offer to have the surgeon call him if he wanted to discuss it before he left home, but also informed that he would have the opportunity to see and talk to the surgeon when he got to the hospital and before he went down to the operating room.    Patient was also informed that if he turned down this donor, he would not be punished or removed from the transplant list, that he would remain on the list at his current status/MELD score. However, by waiting on the list longer rather than receiving this transplant, he will face a greater risk of death than any risk from the slight possibility of transmitted infection.    Reviewed with Dr. Martins the [facility] increased risk donor information shared " with the patient.  Dr. Martins is aware that patient does not request to speak to a doctor in reference to the donor risk factors.      Patient verbalized willingness to come in for transplant. Patient is currently inpatient (TSU). Spoke to Dr Evans and Qi Mcguire NP, and patient cleared for transplant.

## 2017-10-18 NOTE — HOSPITAL COURSE
Interval hx: no acute events overnight. Pt with increased abdominal pain this am, mainly on the right side. May have been related to recent opening of [employer]. Cont with MWF HD as renal function has not recovered yet. Overall progressing well and wound vac placed to [place] today. Plan for colonoscopy in am per GI. Monitor.

## 2017-10-18 NOTE — ASSESSMENT & PLAN NOTE
- Dietary consulted and following. Currently on high protein/high calorie diet with fluid restriction.   - Calorie counts in progress and boost supplements.

## 2017-10-18 NOTE — ASSESSMENT & PLAN NOTE
- reported about a fifth of whiskey daily since teenager.  - psych consulted, high risk for transplant. To complete ABU program s/p transplant  - [facility] 174 on admit 9/27. Last drink approx 2.5 weeks prior to admission.

## 2017-10-18 NOTE — PROGRESS NOTES
Admit/1st Transplant Note:    Met with patient and mother to assess needs. Patient is a 27 y.o. 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      Patient transferred from [facility] in [place] on 9/27/2017 .  At this time, patient presents as alert and oriented x 4, pleasant, calm, communicative, cooperative and confused.  At this time, patients caregiver presents as alert and oriented x 4, pleasant, good eye contact, well groomed, recall good, concentration/judgement good, average intelligence, calm, communicative, cooperative and asking and answering questions appropriately.    Household/Family Systems     Patient previously resided with patient's roommate Kaila Schwarz, at [address]  [place] [address] but will living with his mother back in [place], [place] once he is cleared to return home post-transplant.  Support system includes pt's mother Sanjuanita, roommate Kaila, sister Miranda, and aunt Sophy.  Patient does not have dependents that are need of being cared for.     Patients primary caregiver is Sanjuanitaeduardo Diana, patients mother, phone number [phone] which is pt's cell phone as mother's cell phone ([phone]) is currently not in service.  Confirmed patients contact information is [phone] (home);   Telephone Information:   [place] [phone]       During admission, patient's caregiver plans to stay in patient's room.  Confirmed patient and patients caregivers do have access to reliable transportation.    Cognitive Status/Learning     Patient reports reading ability as college and states patient does have difficulty with comprehension and memory due to encephalopathy.  Patient reports patient learns best by multi-modal instruction.   Needed: No.   Highest education level: Attended some college    Vocation/Disability     Working for Income: No  If no, reason not working: Demands of Treatment; Pt currently on STD through employer with [employer] disability. Pt receives  "60% of his full-time pay for 26 weeks and then this will change over to LTD. Pt reports that he is still employed by [employer].     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 Pt last worked prior to getting sick a few months ago. Pt's mother currently trying to figure out how pt's insurance premium is being paid and will get in touch with SW to confirm this.     Adherence     Patient reports a high level of adherence to patients health care regimen since being admitted to the hospital. Prior to this, pt rarely saw a doctor until he began having symptoms of ESLD a few months ago.  Adherence counseling and education provided. Patient verbalizes understanding.    Substance Use    Patient reports the following substance usage.    Tobacco: none, patient denies any use.   Alcohol: none, patient denies any use. Pt's last ETOH use was 2 months ago when he became acutely sick with ETOH cirrhosis. Per psychosocial assessment from 9/27/2017 by Liver SW Abram Steward LM, pt reported that prior to stopping, he would drink at least 1/5th of whiskey daily from [id number]. Pt attempted to stop drinking in 2016 by tapering down his ETOH intake and would have 4-5 "stiff" drinks of whiskey per day. By beginning of 2017, pt tapered down to one glass of wine daily with intention of stopping completely. Pt had indicated to ZANE Steward that he knew he had a problem with alcohol but would experience too many withdrawal symptoms if he completely tried to stop. 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 Pt reports that the job was very stressful. Pt amenable to completing IOP at [facility] once pt is discharged from the hospital. Pt and mother notified that pt can stay at the [facility] until program completed that pt enroll as soon as he is medically stable. Pt and mother expressed understanding.   Illicit Drugs/Non-prescribed Medications: none, patient " denies any use. Pt reports brief addiction to Lortabs in  right after college.     Patient states clear understanding of the potential impact of substance use.  Substance abstinence/cessation counseling, education and resources provided and reviewed.     Services Utilizing/ADLS    Infusion Service: Prior to admission, patient utilizing? no  Home Health: Prior to admission, patient utilizing? no  DME: Prior to admission, no  Pulmonary/Cardiac Rehab: Prior to admission, no  Dialysis:  Prior to admission, no  Transplant Specialty Pharmacy:  Prior to admission, no. Pt will use [facility] while locally in [place].     Prior to admission, patient reports patient was not independent with ADLS and was not driving.  Patient reports patient is not able to care for self at this time due to compromised medical condition (as documented in medical record) and physical weakness.  Patient indicates a willingness to care for self once medically cleared to do so.    Insurance/Medications    Insured by   Payor/Plan Subscr  Sex Relation Sub. Ins. ID Effective Group Num   1. [employer] [facility]* RAFFY THOMAS 1990 Male  [id number]                                   [address]   2. [employer] [facility]* RAFFY THOMAS 1990 Male  [id number] Not Eff [id number]                                   [address] [address]      Primary Insurance (for UNOS reporting): Private Insurance-[facility] *Pt's mother inquiring with pt's employer as to how pt's premium is being paid now that pt is on STD through his company. Mother will follow up with SW once this is determined.   Secondary Insurance (for UNOS reporting): None    Patient reports patient is able to obtain and afford medications at this time and at time of discharge.    Living Will/Healthcare Power of     Patient states patient has a LW and/or HCPA.   provided education regarding LW and HCPA and the completion of forms.    Coping/Mental Health    Patient is coping  well with the aid of  family members and friends. Pt's mother reports that pt has a line of people willing to provide support to pt if needed. Pt has a history of social anxiety since childhood. Pt reports that drinking assisted with decreased his anxiety so that he could socialize. Pt amenable to mental health treatment. Pt has been on anti-depressants in the past but did not find them helpful. Pt has no current or past hx of SI/HI.  Patient indicates mental health difficulties.     Discharge Planning    At time of discharge, patient plans to return to [facility] [facility] apartments under the care of pt's mother.  Patients mother will transport patient.  Per rounds today, expected discharge date has not been medically determined at this time. Patient and patients caregiver  verbalize understanding and are involved in treatment planning and discharge process.    Additional Concerns  SW answered multiple questions asked by pt's mother regarding process for [employer]. SW notified pt's mother that once pt has transplant, he will be added to the waitlist and LR profile will be provided to mother to complete tomorrow if pt receives transplant tonight. Pt's mother expressed understanding. Patient's caretaker denies additional needs and/or concerns at this time. Patient is being followed for needs, education, resources, information, emotional support, supportive counseling, and for supportive and skilled discharge plan of care.  providing ongoing psychosocial support, education, resources and d/c planning as needed.  SW remains available.  provided resource list, patient choice, psychosocial and supportive counseling, resources, education, assistance and discharge planning with patient and caregiver involvement, ongoing SW availability and services as appropriate.

## 2017-10-18 NOTE — SUBJECTIVE & OBJECTIVE
Scheduled Meds:   fluconazole  200 mg Oral Daily    methylPREDNISolone sodium succinate  500 mg Intravenous Once    octreotide  100 mcg Intravenous Q8H    pantoprazole  40 mg Oral BID AC    rifAXImin  550 mg Oral BID    sodium bicarbonate  1,950 mg Per NG tube TID    sodium chloride  1 g Oral QID    sucralfate  1 g Oral Q6H     Continuous Infusions:   PRN Meds:sodium chloride, sodium chloride, ampicillin-sulbactim (UNASYN) IVPB, hepatitis B virus vacc.rec(PF), mupirocin, ondansetron    Review of Systems   Constitutional: Positive for appetite change. Negative for activity change, chills, fatigue and fever.   HENT: Negative for sore throat and trouble swallowing.    Eyes: Negative for photophobia and visual disturbance.   Respiratory: Negative for cough, shortness of breath and wheezing.    Cardiovascular: Negative for chest pain, palpitations and leg swelling.   Gastrointestinal: Positive for abdominal distention and abdominal pain. Negative for constipation, diarrhea, nausea and vomiting.   Endocrine: Negative for cold intolerance and heat intolerance.   Genitourinary: Positive for scrotal swelling. Negative for dysuria and frequency.   Musculoskeletal: Negative for arthralgias and myalgias.   Skin: Positive for color change (jaundice). Negative for rash and wound.   Neurological: Negative for dizziness, syncope, weakness and light-headedness.   Psychiatric/Behavioral: Positive for sleep disturbance. Negative for confusion and hallucinations.   All other systems reviewed and are negative.    Objective:     Vital Signs (Most Recent):  Temp: 98.9 °F (37.2 °C) (10/18/17 1158)  Pulse: 95 (10/18/17 1158)  Resp: 16 (10/18/17 1158)  BP: (!) 117/52 (10/18/17 1244)  SpO2: 98 % (10/18/17 1158) Vital Signs (24h Range):  Temp:  [98.3 °F (36.8 °C)-98.9 °F (37.2 °C)] 98.9 °F (37.2 °C)  Pulse:  [] 95  Resp:  [16-19] 16  SpO2:  [94 %-100 %] 98 %  BP: ()/(49-59) 117/52     Weight: 96.5 kg (212 lb 12.8 oz)  Body  mass index is 33.33 kg/m².    Intake/Output - Last 3 Shifts       10/16 0700 - 10/17 0659 10/17 0700 - 10/18 0659 10/18 0700 - 10/19 0659    P.O. 1360 900 180    I.V. (mL/kg) 0 (0)      Other 0      TPN       Total Intake(mL/kg) 1360 (14.5) 900 (9.3) 180 (1.9)    Urine (mL/kg/hr) 1230 (0.5) 620 (0.3) 310 (0.5)    Emesis/NG output 0 (0) 0 (0)     Other 0 (0) 0 (0)     Stool 0 (0) 0 (0) 0 (0)    Blood 0 (0) 0 (0)     Total Output 1230 620 310    Net +130 +280 -130           Urine Occurrence 0 x 0 x     Stool Occurrence 7 x 0 x 3 x    Emesis Occurrence 0 x 0 x           Physical Exam   Constitutional: He is oriented to person, place, and time. He appears well-developed and well-nourished. He is cooperative.  Non-toxic appearance. No distress.   HENT:   Head: Normocephalic and atraumatic.   Mouth/Throat: Oropharynx is clear and moist.   Eyes: EOM are normal. Pupils are equal, round, and reactive to light. Scleral icterus is present.   Neck: Normal range of motion. Neck supple.   Cardiovascular: Regular rhythm, normal heart sounds, intact distal pulses and normal pulses.    No murmur heard.  Pulmonary/Chest: Effort normal. No respiratory distress. He has decreased breath sounds in the right lower field and the left lower field. He has no wheezes. He has no rales.   Abdominal: Soft. Normal appearance and bowel sounds are normal. He exhibits distension and ascites. He exhibits no mass. There is no tenderness. There is no rebound and no guarding. No hernia.   Musculoskeletal: Normal range of motion. He exhibits edema (+2/+3 generalized).   Lymphadenopathy:     He has no cervical adenopathy.   Neurological: He is alert and oriented to person, place, and time. He is not disoriented.   Skin: Skin is warm and dry. He is not diaphoretic.   Old blister/ wart on right plantar surface of the foot near the big toe  Jaundice   Psychiatric: He has a normal mood and affect. His behavior is normal. His affect is not labile. His speech  is delayed. Cognition and memory are not impaired.   Nursing note and vitals reviewed.      Laboratory:  Immunosuppressants     None        CBC:   Recent Labs  Lab 10/18/17  0510   WBC 15.48*   RBC 2.16*   HGB 7.7*   HCT 21.1*   *   MCV 98   MCH 35.6*   MCHC 36.5*     CMP:   Recent Labs  Lab 10/18/17  0510   *   CALCIUM 9.0   ALBUMIN 2.9*   PROT 5.7*   *   K 2.9*   CO2 16*   CL 95   BUN 86*   CREATININE 2.5*   ALKPHOS 125   ALT 77*   *   BILITOT 33.2*     Coagulation:   Recent Labs  Lab 10/18/17  0510   INR 1.7*     Labs within the past 24 hours have been reviewed.    Diagnostic Results:  I have personally reviewed all pertinent imaging studies.

## 2017-10-18 NOTE — ASSESSMENT & PLAN NOTE
- Pt had an episode of hematemesis at OSH and EGD that revealed 1 small varix, reflux esophagitis, and gastropathy of portal hypertension  - No evidence of bleeding currently. H/H decreased but stable.   - Continue Pantoprazole BID

## 2017-10-18 NOTE — H&P
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 Prior lap appy. Has had paracentesis x 3, most recently 10/12/17.    The first donor liver this evening was deemed unsuitable (donor was blood type O, CMV +, other serologies negative but it was a high risk donor due to sexual contact with Hepatitis contact)    He is still scheduled to undergo transplant tonight but the time is uncertain.    MELD Score: MELD-Na score: 36 at 10/18/2017  5:10 AM  MELD score: 34 at 10/18/2017  5:10 AM  Calculated from:  Serum Creatinine: 2.5 mg/dL at 10/18/2017  5:10 AM  Serum Sodium: 127 mmol/L at 10/18/2017  5:10 AM  Total Bilirubin: 33.2 mg/dL at 10/18/2017  5:10 AM  INR(ratio): 1.7 at 10/18/2017  5:10 AM  Age: 27 years    No prescriptions prior to admission.       Review of patient's allergies indicates:   Allergen Reactions    Bactrim [sulfamethoxazole-trimethoprim] Other (See Comments)     Mookie Trell Syndrome       Past Medical History:   Diagnosis Date    Hypertension      Past Surgical History:   Procedure Laterality Date    APPENDECTOMY       History reviewed. No pertinent family history.  Social History   Substance Use Topics    Smoking status: Former Smoker     Types: Cigarettes    Smokeless tobacco: Never Used    Alcohol use Yes      Comment: a fifth of liquor daily for years, cut back over last 2 months        Review of Systems   Constitutional: Positive for activity change, appetite change and unexpected weight change.   Respiratory: Positive for shortness of breath. Negative for cough, chest tightness and wheezing.    Cardiovascular: Positive for leg swelling.  Negative for chest pain and palpitations.   Gastrointestinal: Positive for abdominal distention and abdominal pain.   Genitourinary: Positive for decreased urine volume and difficulty urinating.   Musculoskeletal: Negative.    Skin: Positive for color change (jaundice).   Neurological: Negative.    Hematological: Bruises/bleeds easily.   Psychiatric/Behavioral: Positive for confusion.     OBJECTIVE:     Vital Signs (Most Recent)  Temp: 98.9 °F (37.2 °C) (10/18/17 1158)  Pulse: 95 (10/18/17 1158)  Resp: 16 (10/18/17 1158)  BP: (!) 117/52 (10/18/17 1244)  SpO2: 98 % (10/18/17 1158)    Physical Exam   Constitutional: He is oriented to person, place, and time. He appears well-developed.   HENT:   Head: Normocephalic.   Eyes: Scleral icterus is present.   Neck: Normal range of motion.   Cardiovascular: Normal rate, regular rhythm and intact distal pulses.    Pulmonary/Chest: No respiratory distress.   Increased WOB   Abdominal: He exhibits distension. There is no tenderness.   Ascitic fluid wave   Musculoskeletal: He exhibits edema (bilateral lower extremities).   Neurological: He is alert and oriented to person, place, and time.   Skin: Rash (erythematous rash hands and abdomen) noted. There is erythema.   jaundice   Psychiatric: Judgment and thought content normal.   Slowed speech, appropriate language   Nursing note and vitals reviewed.    Laboratory  CBC:   Recent Labs  Lab 10/18/17  1246   WBC 15.17*   RBC 2.15*   HGB 7.6*   HCT 20.9*      MCV 97   MCH 35.3*   MCHC 36.4*     CMP:   Recent Labs  Lab 10/18/17  0510   *   CALCIUM 9.0   ALBUMIN 2.9*   PROT 5.7*   *   K 2.9*   CO2 16*   CL 95   BUN 86*   CREATININE 2.5*   ALKPHOS 125   ALT 77*   *   BILITOT 33.2*       Diagnostic Results:  EKG and CXR pending    ASSESSMENT/PLAN:     The patient presents for liver transplant.  There are no apparent contraindications to proceeding with the planned transplant.  The patient understands that the  transplant could potentially be cancelled pending detailed assessment of the donor organ.    He will receive IV steroids pulse induction.    Malnutrition - n/a  Nutritional support: Not indicated  Hepatic encephalopathy - n/a  PSE management:none  A complete discussion of the transplant procedure, including risks, complications, and alternatives, as well as any donor-specific risk factors requiring specific disclosure, will be carried out by the responsible staff surgeon prior to the procedure.     ECG: NSR, X-Ray: pending, Admit to liver transplant surgery, NPO    Blood type O, CMV +, MELD 36, will need intraop dialysis.  Donor Blood Type O, Standard donor, CMV+, all other serologies negative.  OR time pending

## 2017-10-18 NOTE — HPI
Jhonny Diana is a 28 yo male with hx of childhood asthma and heavy alcohol abuse (fifth of liquor daily since teenager, last drink 2.5 wks ago per mother) who was transferred to [facility] for higher level of care/liver transplant evaluation. Patient initially presented to OSH for a 2 mo hx of progressively worsening abdominal pain with associated fatigue, jaundice, and abdominal distention with ~20# weight gain.  Reports family hx of alcoholic cirrhosis in father. Pt was recently diagnosed with alcoholic hepatitis during his admission at OSH on 9/6-9/15. During that hospitalization, patient was given ceftriaxone for SBP ppx, prednisone for alcoholic hepatitis, and diuresed. Pt was discharged and instructed to follow up with PCP and AA within 1 wk. After his discharge, patient returned to ED 4 days after for increased weight gain and worsening abdominal pain/jaundice.     Mr. Diana was admitted to the [facility] on 9/27 for decompensated alcoholic hepatitis with high MELD score, severe HE, and LAURA. Hepatology consulted and followed to evaluate liver transplant candidacy. Pt started on albumin and lasix for LAURA and possibility for HRS. However, nephrology felt that LAURA is likely multifactorial: ATN given hypotensive episodes at OSH and toxic given severe hyperbilirubinemia; not truly HRS. Lactulose/rifaximin started for HE and mental status slowly improved. Mr. Diana with significant ABD pain since admission with ABD distension. CT abdomen was significant for pericolonic fat stranding of right colon; no obstruction. Also with diffuse mesenteric edema. No significant ascites noted, no cholecystitis/cholelithiasis/ductal dilation. US liver with doppler showed appropriate vasculature.  He was stepped down to Hospital Medicine on 10/5.  Paracentesis was done on 10/9 with 3.3L removed and 10/12 with 1.8 removed, no infection. His diet was advanced and Dietary was consulted to help get adequate caloric intake. Listed for liver  transplant 10/17 with MELD 36.  Pt received organ offer and underwent liver txp 10/19 which took place without complication.  Steroid induction used, CMV +/+.  Post operatively, LFTs/enzymes trended down nicely.  POD 5 U/S with elevated RIs and sluggish flow in HA system, otherwise ok.  Pt with LAURA prior to and s/p txp requiring dialysis.  He is tolerating HD with last HD 10/30.

## 2017-10-18 NOTE — ASSESSMENT & PLAN NOTE
- WBC peaked 10/6 at 31K. Remains elevated around 15K.   - blood and urine culture 10/13 NGTD. Paracentesis with non-infected fluid.

## 2017-10-18 NOTE — PROGRESS NOTES
"[facility]  Liver Transplant  Progress Note    Patient Name: Jhonny Diana  MRN: [id number]  Admission Date: 9/27/2017  Hospital Length of Stay: 21 days  Code Status: Full Code  Primary Care Provider: Provider Not[facility]    Subjective:     History of Present Illness:  Jhonny Diana is a 26 yo male with hx of childhood asthma and heavy alcohol abuse (fifth of liquor daily since teenager, last drink 2.5 wks ago per mother) who was transferred to [facility] for higher level of care/liver transplant evaluation. Patient initially presented to OSH for a 2 mo hx of progressively worsening abdominal pain with associated fatigue, jaundice, and abdominal distention with ~20# weight gain. HPI given by patient's mother and OSH records as patient's participation in interview limited by previous administration of pain medication at OSH. Per mother, patient did not have a fever, chills, dark stools, known sick contacts, nausea, vomiting, diarrhea, confusion, or HA. Reports family hx of alcoholic cirrhosis in father. Pt was recently diagnosed with alcoholic hepatitis during his admission at OSH on 9/6-9/15. During that hospitalization, patient was given ceftriaxone for SBP ppx, prednisone for alcoholic hepatitis, and diuresed. Pt was discharged and instructed to follow up with PCP and AA within 1 wk. After his discharge, patient returned to ED 4 days after for increased weight gain and worsening abdominal pain/jaundice. At OSH, patient was given fluids (for suspected pancreatitis), diuresed and given IV albumin. Two days prior to transfer to [facility], patient had an episode of hematemesis and underwent an EGD on 9/25 that revealed "1 sm varix in distal esophagus, no stigmata of bleeding, gastropathy of portal hypertension, and reflux esophagitis".  No further episodes of hematemesis noted since initial episode.      Recent labs at OSH: Na 134, CO2 18, BUN 36, Cr 2, Tprot 5.9, albumin 1.9, Tbili 24, alk phos 83, , ALT51. " Patient also had lipase 532 and ammonia 118 during his previous hospitalization at OSH. Labwork also notable for negative mitochondrial ab, sm muscle ab, and ceruloplasmin 22. Imaging at OSH revealing abdo U/S (9/20): hepatosplenomegaly, CT abdo w contrast: hepatomegaly with marked fatty infiltration of the liver and mild ascites (9/6). No CDs available for review.    Hospital Course:  Mr. Diana was admitted to the [facility] on 9/27 for decompensated alcoholic hepatitis with high MELD score, severe HE, and LAURA. Hepatology consulted and following to evaluate liver transplant candidacy. Pt started on albumin and lasix for LAURA and possibility for HRS. However, nephrology felt that LAURA is likely multifactorial: ATN given hypotensive episodes at OSH and toxic given severe hyperbilirubinemia; not truly HRS. Lactulose/rifaximin started for HE and mental status improving slowly. Mr. Diana has had significant ABD pain since admission with ABD distension. CT abdomen was significant for pericolonic fat stranding of right colon; no obstruction. Also with diffuse mesenteric edema. No significant ascites noted, no cholecystitis/cholelithiasis/ductal dilation. US liver with doppler showed appropriate vasculature.  He was stepped down to Hospital Medicine on 10/5.  Paracentesis was done on 10/9 with 3.3L removed and 10/12 with 1.8 removed, no infection. His diet was advanced and Dietary was consulted to help get adequate caloric intake. Listed for liver transplant 10/17 with MELD 36.    Interval History: no acute events overnight. Reports sleepiness this morning from not sleeping overnight due to abd pain/cramping. Liver transplant offered and accepted today. Cr up to 2.5, holding diuretics. Na improved to 127 today, on Nacl tabs. H/H stable, last received PRBC on 10/16. WBCs remain elevated, cultures with NGTD. ID consulted for ESBL in resp culture, plan for meropenem intra-op. If txp surgery canceled, will consider therapeutic  paracentesis by the end of the week.     Scheduled Meds:   fluconazole  200 mg Oral Daily    methylPREDNISolone sodium succinate  500 mg Intravenous Once    octreotide  100 mcg Intravenous Q8H    pantoprazole  40 mg Oral BID AC    rifAXImin  550 mg Oral BID    sodium bicarbonate  1,950 mg Per NG tube TID    sodium chloride  1 g Oral QID    sucralfate  1 g Oral Q6H     Continuous Infusions:   PRN Meds:sodium chloride, sodium chloride, ampicillin-sulbactim (UNASYN) IVPB, hepatitis B virus vacc.rec(PF), mupirocin, ondansetron    Review of Systems   Constitutional: Positive for appetite change. Negative for activity change, chills, fatigue and fever.   HENT: Negative for sore throat and trouble swallowing.    Eyes: Negative for photophobia and visual disturbance.   Respiratory: Negative for cough, shortness of breath and wheezing.    Cardiovascular: Negative for chest pain, palpitations and leg swelling.   Gastrointestinal: Positive for abdominal distention and abdominal pain. Negative for constipation, diarrhea, nausea and vomiting.   Endocrine: Negative for cold intolerance and heat intolerance.   Genitourinary: Positive for scrotal swelling. Negative for dysuria and frequency.   Musculoskeletal: Negative for arthralgias and myalgias.   Skin: Positive for color change (jaundice). Negative for rash and wound.   Neurological: Negative for dizziness, syncope, weakness and light-headedness.   Psychiatric/Behavioral: Positive for sleep disturbance. Negative for confusion and hallucinations.   All other systems reviewed and are negative.    Objective:     Vital Signs (Most Recent):  Temp: 98.9 °F (37.2 °C) (10/18/17 1158)  Pulse: 95 (10/18/17 1158)  Resp: 16 (10/18/17 1158)  BP: (!) 117/52 (10/18/17 1244)  SpO2: 98 % (10/18/17 1158) Vital Signs (24h Range):  Temp:  [98.3 °F (36.8 °C)-98.9 °F (37.2 °C)] 98.9 °F (37.2 °C)  Pulse:  [] 95  Resp:  [16-19] 16  SpO2:  [94 %-100 %] 98 %  BP: ()/(49-59) 117/52      Weight: 96.5 kg (212 lb 12.8 oz)  Body mass index is 33.33 kg/m².    Intake/Output - Last 3 Shifts       10/16 0700 - 10/17 0659 10/17 0700 - 10/18 0659 10/18 0700 - 10/19 0659    P.O. 1360 900 180    I.V. (mL/kg) 0 (0)      Other 0      TPN       Total Intake(mL/kg) 1360 (14.5) 900 (9.3) 180 (1.9)    Urine (mL/kg/hr) 1230 (0.5) 620 (0.3) 310 (0.5)    Emesis/NG output 0 (0) 0 (0)     Other 0 (0) 0 (0)     Stool 0 (0) 0 (0) 0 (0)    Blood 0 (0) 0 (0)     Total Output 1230 620 310    Net +130 +280 -130           Urine Occurrence 0 x 0 x     Stool Occurrence 7 x 0 x 3 x    Emesis Occurrence 0 x 0 x           Physical Exam   Constitutional: He is oriented to person, place, and time. He appears well-developed and well-nourished. He is cooperative.  Non-toxic appearance. No distress.   HENT:   Head: Normocephalic and atraumatic.   Mouth/Throat: Oropharynx is clear and moist.   Eyes: EOM are normal. Pupils are equal, round, and reactive to light. Scleral icterus is present.   Neck: Normal range of motion. Neck supple.   Cardiovascular: Regular rhythm, normal heart sounds, intact distal pulses and normal pulses.    No murmur heard.  Pulmonary/Chest: Effort normal. No respiratory distress. He has decreased breath sounds in the right lower field and the left lower field. He has no wheezes. He has no rales.   Abdominal: Soft. Normal appearance and bowel sounds are normal. He exhibits distension and ascites. He exhibits no mass. There is no tenderness. There is no rebound and no guarding. No hernia.   Musculoskeletal: Normal range of motion. He exhibits edema (+2/+3 generalized).   Lymphadenopathy:     He has no cervical adenopathy.   Neurological: He is alert and oriented to person, place, and time. He is not disoriented.   Skin: Skin is warm and dry. He is not diaphoretic.   Old blister/ wart on right plantar surface of the foot near the big toe  Jaundice   Psychiatric: He has a normal mood and affect. His behavior is  normal. His affect is not labile. His speech is delayed. Cognition and memory are not impaired.   Nursing note and vitals reviewed.      Laboratory:  Immunosuppressants     None        CBC:   Recent Labs  Lab 10/18/17  0510   WBC 15.48*   RBC 2.16*   HGB 7.7*   HCT 21.1*   *   MCV 98   MCH 35.6*   MCHC 36.5*     CMP:   Recent Labs  Lab 10/18/17  0510   *   CALCIUM 9.0   ALBUMIN 2.9*   PROT 5.7*   *   K 2.9*   CO2 16*   CL 95   BUN 86*   CREATININE 2.5*   ALKPHOS 125   ALT 77*   *   BILITOT 33.2*     Coagulation:   Recent Labs  Lab 10/18/17  0510   INR 1.7*     Labs within the past 24 hours have been reviewed.    Diagnostic Results:  I have personally reviewed all pertinent imaging studies.    Assessment/Plan:     * Alcoholic hepatitis with ascites    - listed for liver transplant 10/17 with MELD 35. MELD 36 today. Liver txp offered.     MELD-Na score: 36 at 10/18/2017  5:10 AM  MELD score: 34 at 10/18/2017  5:10 AM  Calculated from:  Serum Creatinine: 2.5 mg/dL at 10/18/2017  5:10 AM  Serum Sodium: 127 mmol/L at 10/18/2017  5:10 AM  Total Bilirubin: 33.2 mg/dL at 10/18/2017  5:10 AM  INR(ratio): 1.7 at 10/18/2017  5:10 AM  Age: 27 years        Acute blood loss anemia    - H/h decreased but stable. Last transfused PRBC 10/16.   - no obvious signs of bleeding. Monitor daily.         LAURA (acute kidney injury)    - nephrology following, suspect LAURA multifactorial: ischemic ATN given hypotension and injury due to hyperbilirubinemia. Felt less likely to be HRS  - Cr worsened today, good uop. Holding diuretics. Will likely need intra-op HD for transplant surgery.         Ascites due to alcoholic cirrhosis    - Paracentesis 10/9 with 3.3L off and 10/12 with 1.8L off. Fluid non-infectious.  - holding diuretics 2/2 hyponatremia and LAURA.  - if not transplanted, will consider paracentesis by the end of the week.        Severe malnutrition    - Dietary consulted and following. Currently on high  protein/high calorie diet with fluid restriction.   - Calorie counts in progress and boost supplements.         Coagulopathy    - 2/2 liver disease. Monitor daily.         Leukocytosis    - WBC peaked 10/6 at 31K. Remains elevated around 15K.   - blood and urine culture 10/13 NGTD. Paracentesis with non-infected fluid.         Metabolic acidosis with normal anion gap and bicarbonate losses    - likely 2/2 diarrhea given lactulose.   - continue Nabicarb supplement. Monitor.         Hypokalemia    - K 2.9 today. Replace with po. Monitor.         Decompensated hepatic cirrhosis with ascites and HE    - See alcoholic hepatitis with ascites        Anasarca    - 2/2 liver disease and hypoalbuminemia        Hyponatremia    - Na improved holding diuretics and IVF.   - Nacl tabs started 10/16. Na 127 today.        History of hematemesis    - Pt had an episode of hematemesis at OSH and EGD that revealed 1 small varix, reflux esophagitis, and gastropathy of portal hypertension  - No evidence of bleeding currently. H/H decreased but stable.   - Continue Pantoprazole BID        Hepatic encephalopathy    - aaox4. Continue lactulose and rifaxmin. Monitor.         Alcoholism /alcohol abuse    - reported about a fifth of whiskey daily since teenager.  - psych consulted, high risk for transplant. To complete ABU program s/p transplant  - PETH 174 on admit 9/27. Last drink approx 2.5 weeks prior to admission.             VTE Risk Mitigation         Ordered     IP VTE HIGH RISK PATIENT  Once      10/18/17 1232     Send SCD stockings and ISMA hose with patient to OR  Continuous      10/18/17 1232     Place sequential compression device  Until discontinued      09/28/17 0238          The patients clinical status was discussed at multidisplinary rounds, involving transplant surgery, transplant medicine, pharmacy, nursing, nutrition, and social work    Discharge Planning: not stable for discharge at this time. To OR for liver transplant  tonight around 22:00.    Shereen Mcguire, MATILDE  Liver Transplant  [facility]

## 2017-10-18 NOTE — PT/OT/SLP PROGRESS
"Occupational Therapy      Jhonny Diana  MRN: [id number]    RN contacted prior to arrival to pt's room. Pt attempted to be seen in PM (in 1335, OT left room 1335) for OT session.Patient not seen today secondary to Patient fatigue and unwilling to participate in OT at this time. Pt stated, " Honestly, I'm pretty tired and I'm getting my transplant tonight so I really don't want to do anything but just take a nap and rest." Therapist encouraged pt to participate in OT session and education pt on the benefits of OT pre/post liver transplant. Pt verbalized understanding but declined to participate in self-care tasks and functional mobility.  Will follow-up at next scheduled visit. Please re-consult therapy post liver transplant.     Samantha Lopez, OT  10/18/2017  "

## 2017-10-18 NOTE — ASSESSMENT & PLAN NOTE
- H/h decreased but stable. Last transfused PRBC 10/16.   - no obvious signs of bleeding. Monitor daily.

## 2017-10-18 NOTE — SUBJECTIVE & OBJECTIVE
Interval History: NAEON.  Lactulose held yesterday, decreased BM and worsening lethargy but still alert and oriented.  Creatine bump to 2.5 UOP down to 900 cc per 24hr.  Continues to complain of abdominal distention and trouble breathing but otherwise reports he is the same.       Review of patient's allergies indicates:   Allergen Reactions    Bactrim [sulfamethoxazole-trimethoprim] Other (See Comments)     Mookie Trell Syndrome     Current Facility-Administered Medications   Medication Frequency    fluconazole tablet 200 mg Daily    hepatitis B virus vacc.rec(PF) injection 40 mcg vaccine x 1 dose    lactulose 20 gram/30 mL solution Soln 20 g TID    octreotide injection 100 mcg Q8H    ondansetron injection 4 mg Q4H PRN    pantoprazole EC tablet 40 mg BID AC    rifAXIMin tablet 550 mg BID    sodium bicarbonate tablet 1,950 mg TID    sodium chloride tablet 1 g QID    sucralfate 100 mg/mL suspension 1 g Q6H       Objective:     Vital Signs (Most Recent):  Temp: 98.4 °F (36.9 °C) (10/18/17 0738)  Pulse: 100 (10/18/17 0907)  Resp: 17 (10/18/17 0738)  BP: (!) 116/52 (10/18/17 0740)  SpO2: 95 % (10/18/17 0738)  O2 Device (Oxygen Therapy): room air (10/18/17 0738) Vital Signs (24h Range):  Temp:  [98.3 °F (36.8 °C)-99.2 °F (37.3 °C)] 98.4 °F (36.9 °C)  Pulse:  [] 100  Resp:  [16-19] 17  SpO2:  [94 %-100 %] 95 %  BP: (107-117)/(49-59) 116/52     Weight: 96.5 kg (212 lb 12.8 oz) (10/18/17 0400)  Body mass index is 33.33 kg/m².  Body surface area is 2.14 meters squared.    I/O last 3 completed shifts:  In: 1620 [P.O.:1620]  Out: 1370 [Urine:1370]    Physical Exam   Constitutional: He is oriented to person, place, and time. He appears well-developed and well-nourished. No distress.   HENT:   Head: Normocephalic and atraumatic.   Eyes: EOM are normal. Scleral icterus is present.   Cardiovascular: Normal rate and regular rhythm.    Pulmonary/Chest: Effort normal and breath sounds normal.   Abdominal: He  exhibits distension. There is no tenderness.   Musculoskeletal: He exhibits edema. He exhibits no deformity.   Neurological: He is alert and oriented to person, place, and time.   Skin: Skin is warm and dry. He is not diaphoretic.   Psychiatric: He has a normal mood and affect. His behavior is normal.       Significant Labs:  All labs within the past 24 hours have been reviewed.   Creatine bump to 2.5 from 2.2, BUN stable at 86   Sodium improved to 126     Significant Imaging:  Labs: Reviewed  X-Ray: Reviewed

## 2017-10-19 ENCOUNTER — ANESTHESIA EVENT (OUTPATIENT)
Dept: SURGERY | Facility: HOSPITAL | Age: 27
DRG: 005 | End: 2017-10-19
Payer: COMMERCIAL

## 2017-10-19 ENCOUNTER — TELEPHONE (OUTPATIENT)
Dept: TRANSPLANT | Facility: CLINIC | Age: 27
End: 2017-10-19

## 2017-10-19 ENCOUNTER — ANESTHESIA (OUTPATIENT)
Dept: SURGERY | Facility: HOSPITAL | Age: 27
DRG: 005 | End: 2017-10-19
Payer: COMMERCIAL

## 2017-10-19 PROBLEM — K85.20 ACUTE ALCOHOLIC PANCREATITIS: Status: RESOLVED | Noted: 2017-09-28 | Resolved: 2017-10-19

## 2017-10-19 PROBLEM — K70.11 ASCITES DUE TO ALCOHOLIC HEPATITIS: Status: ACTIVE | Noted: 2017-10-05

## 2017-10-19 LAB
ALBUMIN SERPL BCP-MCNC: 2 G/DL
ALBUMIN SERPL BCP-MCNC: 2.5 G/DL
ALBUMIN SERPL BCP-MCNC: 2.6 G/DL
ALBUMIN SERPL BCP-MCNC: 2.8 G/DL
ALBUMIN SERPL BCP-MCNC: 2.8 G/DL
ALP SERPL-CCNC: 130 U/L
ALT SERPL W/O P-5'-P-CCNC: 77 U/L
ALT SERPL W/O P-5'-P-CCNC: 97 U/L
ANION GAP SERPL CALC-SCNC: 15 MMOL/L
ANION GAP SERPL CALC-SCNC: 15 MMOL/L
APPEARANCE FLD: NORMAL
APTT BLDCRRT: 116.8 SEC
APTT BLDCRRT: 40.4 SEC
APTT BLDCRRT: 95.9 SEC
APTT BLDCRRT: >150 SEC
AST SERPL-CCNC: 168 U/L
AST SERPL-CCNC: 265 U/L
BACTERIA UR CULT: NORMAL
BASOPHILS # BLD AUTO: 0.07 K/UL
BASOPHILS NFR BLD: 0.4 %
BILIRUB SERPL-MCNC: 31 MG/DL
BLD PROD TYP BPU: NORMAL
BLD PROD TYP BPU: NORMAL
BLOOD UNIT EXPIRATION DATE: NORMAL
BLOOD UNIT EXPIRATION DATE: NORMAL
BLOOD UNIT TYPE CODE: 5100
BLOOD UNIT TYPE CODE: 5100
BLOOD UNIT TYPE: NORMAL
BLOOD UNIT TYPE: NORMAL
BODY FLD TYPE: NORMAL
BUN SERPL-MCNC: 82 MG/DL
BUN SERPL-MCNC: 82 MG/DL
CA-I BLDV-SCNC: 1.09 MMOL/L
CA-I BLDV-SCNC: 1.17 MMOL/L
CALCIUM SERPL-MCNC: 8.7 MG/DL
CALCIUM SERPL-MCNC: 8.7 MG/DL
CHLORIDE SERPL-SCNC: 97 MMOL/L
CHLORIDE SERPL-SCNC: 97 MMOL/L
CO2 SERPL-SCNC: 16 MMOL/L
CO2 SERPL-SCNC: 16 MMOL/L
CODING SYSTEM: NORMAL
CODING SYSTEM: NORMAL
COLOR FLD: YELLOW
CREAT SERPL-MCNC: 2.3 MG/DL
CREAT SERPL-MCNC: 2.3 MG/DL
DIFFERENTIAL METHOD: ABNORMAL
DISPENSE STATUS: NORMAL
DISPENSE STATUS: NORMAL
EOSINOPHIL # BLD AUTO: 0.2 K/UL
EOSINOPHIL NFR BLD: 1.2 %
ERYTHROCYTE [DISTWIDTH] IN BLOOD BY AUTOMATED COUNT: 19.1 %
EST. GFR  (AFRICAN AMERICAN): 43.1 ML/MIN/1.73 M^2
EST. GFR  (AFRICAN AMERICAN): 43.1 ML/MIN/1.73 M^2
EST. GFR  (NON AFRICAN AMERICAN): 37.3 ML/MIN/1.73 M^2
EST. GFR  (NON AFRICAN AMERICAN): 37.3 ML/MIN/1.73 M^2
FIBRINOGEN PPP-MCNC: 150 MG/DL
FIBRINOGEN PPP-MCNC: 192 MG/DL
FIBRINOGEN PPP-MCNC: 204 MG/DL
FIBRINOGEN PPP-MCNC: 82 MG/DL
GLUCOSE SERPL-MCNC: 104 MG/DL
GLUCOSE SERPL-MCNC: 120 MG/DL
GLUCOSE SERPL-MCNC: 120 MG/DL
GLUCOSE SERPL-MCNC: 128 MG/DL
GLUCOSE SERPL-MCNC: 147 MG/DL
GRAM STN SPEC: NORMAL
GRAM STN SPEC: NORMAL
HCT VFR BLD AUTO: 20.8 %
HCT VFR BLD AUTO: 20.9 %
HCT VFR BLD AUTO: 21.7 %
HCT VFR BLD AUTO: 22 %
HGB BLD-MCNC: 7.7 G/DL
HGB BLD-MCNC: 7.7 G/DL
HGB BLD-MCNC: 7.8 G/DL
HGB BLD-MCNC: 7.9 G/DL
IMM GRANULOCYTES # BLD AUTO: 0.16 K/UL
IMM GRANULOCYTES NFR BLD AUTO: 1 %
INR PPP: 1.8
INR PPP: 1.9
INR PPP: 1.9
INR PPP: 2.1
INR PPP: 2.3
LYMPHOCYTES # BLD AUTO: 1.5 K/UL
LYMPHOCYTES NFR BLD: 9.4 %
LYMPHOCYTES NFR FLD MANUAL: 21 %
MAGNESIUM SERPL-MCNC: 1.6 MG/DL
MAGNESIUM SERPL-MCNC: 1.9 MG/DL
MAGNESIUM SERPL-MCNC: 2.1 MG/DL
MCH RBC QN AUTO: 36.2 PG
MCHC RBC AUTO-ENTMCNC: 35.9 G/DL
MCV RBC AUTO: 101 FL
MESOTHL CELL NFR FLD MANUAL: 1 %
MONOCYTES # BLD AUTO: 1.7 K/UL
MONOCYTES NFR BLD: 10.7 %
MONOS+MACROS NFR FLD MANUAL: 48 %
NEUTROPHILS # BLD AUTO: 12.4 K/UL
NEUTROPHILS NFR BLD: 77.3 %
NEUTROPHILS NFR FLD MANUAL: 30 %
NRBC BLD-RTO: 0 /100 WBC
PHOSPHATE SERPL-MCNC: 3.6 MG/DL
PHOSPHATE SERPL-MCNC: 3.6 MG/DL
PLATELET # BLD AUTO: 119 K/UL
PLATELET # BLD AUTO: 120 K/UL
PLATELET # BLD AUTO: 138 K/UL
PLATELET # BLD AUTO: 153 K/UL
PMV BLD AUTO: 11.6 FL
PMV BLD AUTO: 11.9 FL
PMV BLD AUTO: 12.1 FL
PMV BLD AUTO: 12.1 FL
POTASSIUM SERPL-SCNC: 2.9 MMOL/L
POTASSIUM SERPL-SCNC: 3.2 MMOL/L
POTASSIUM SERPL-SCNC: 3.4 MMOL/L
PROT SERPL-MCNC: 5.6 G/DL
PROTHROMBIN TIME: 17.8 SEC
PROTHROMBIN TIME: 18.7 SEC
PROTHROMBIN TIME: 19.4 SEC
PROTHROMBIN TIME: 21.5 SEC
PROTHROMBIN TIME: 23.5 SEC
RBC # BLD AUTO: 2.18 M/UL
SODIUM SERPL-SCNC: 128 MMOL/L
SODIUM SERPL-SCNC: 128 MMOL/L
SODIUM SERPL-SCNC: 131 MMOL/L
SODIUM SERPL-SCNC: 133 MMOL/L
SODIUM SERPL-SCNC: 134 MMOL/L
TRANS PLATPHERESIS VOL PATIENT: NORMAL ML
UNIT NUMBER: NORMAL
WBC # BLD AUTO: 16.09 K/UL
WBC # FLD: 265 /CU MM

## 2017-10-19 PROCEDURE — 37000009 HC ANESTHESIA EA ADD 15 MINS: Performed by: TRANSPLANT SURGERY

## 2017-10-19 PROCEDURE — C9113 INJ PANTOPRAZOLE SODIUM, VIA: HCPCS | Mod: NTX | Performed by: ANESTHESIOLOGY

## 2017-10-19 PROCEDURE — 89051 BODY FLUID CELL COUNT: CPT

## 2017-10-19 PROCEDURE — 83735 ASSAY OF MAGNESIUM: CPT | Mod: NTX

## 2017-10-19 PROCEDURE — 80100019 *HC HEMODIALYSIS INTRA-OPERATIVE: Mod: NTX

## 2017-10-19 PROCEDURE — 63600175 PHARM REV CODE 636 W HCPCS: Performed by: NURSE ANESTHETIST, CERTIFIED REGISTERED

## 2017-10-19 PROCEDURE — 99231 SBSQ HOSP IP/OBS SF/LOW 25: CPT | Mod: NTX,,, | Performed by: INTERNAL MEDICINE

## 2017-10-19 PROCEDURE — 85730 THROMBOPLASTIN TIME PARTIAL: CPT | Mod: 91

## 2017-10-19 PROCEDURE — 85610 PROTHROMBIN TIME: CPT | Mod: NTX

## 2017-10-19 PROCEDURE — P9017 PLASMA 1 DONOR FRZ W/IN 8 HR: HCPCS

## 2017-10-19 PROCEDURE — 85610 PROTHROMBIN TIME: CPT | Mod: 91

## 2017-10-19 PROCEDURE — 97116 GAIT TRAINING THERAPY: CPT | Mod: NTX

## 2017-10-19 PROCEDURE — 83735 ASSAY OF MAGNESIUM: CPT | Mod: 91

## 2017-10-19 PROCEDURE — 87205 SMEAR GRAM STAIN: CPT

## 2017-10-19 PROCEDURE — 82947 ASSAY GLUCOSE BLOOD QUANT: CPT | Mod: 91

## 2017-10-19 PROCEDURE — P9021 RED BLOOD CELLS UNIT: HCPCS

## 2017-10-19 PROCEDURE — 25000003 PHARM REV CODE 250: Mod: NTX | Performed by: INTERNAL MEDICINE

## 2017-10-19 PROCEDURE — 84100 ASSAY OF PHOSPHORUS: CPT | Mod: NTX

## 2017-10-19 PROCEDURE — 47135 TRANSPLANTATION OF LIVER: CPT | Mod: ,,, | Performed by: TRANSPLANT SURGERY

## 2017-10-19 PROCEDURE — 36000931 HC OR TIME LEV VII EA ADD 15 MIN: Performed by: TRANSPLANT SURGERY

## 2017-10-19 PROCEDURE — 85384 FIBRINOGEN ACTIVITY: CPT

## 2017-10-19 PROCEDURE — 88307 TISSUE EXAM BY PATHOLOGIST: CPT | Mod: 26,,, | Performed by: PATHOLOGY

## 2017-10-19 PROCEDURE — 99233 SBSQ HOSP IP/OBS HIGH 50: CPT | Mod: NTX,,, | Performed by: NURSE PRACTITIONER

## 2017-10-19 PROCEDURE — 82330 ASSAY OF CALCIUM: CPT | Mod: 91

## 2017-10-19 PROCEDURE — C1751 CATH, INF, PER/CENT/MIDLINE: HCPCS | Mod: NTX | Performed by: NURSE ANESTHETIST, CERTIFIED REGISTERED

## 2017-10-19 PROCEDURE — 97535 SELF CARE MNGMENT TRAINING: CPT | Mod: NTX

## 2017-10-19 PROCEDURE — P9045 ALBUMIN (HUMAN), 5%, 250 ML: HCPCS | Performed by: NURSE ANESTHETIST, CERTIFIED REGISTERED

## 2017-10-19 PROCEDURE — 84295 ASSAY OF SERUM SODIUM: CPT | Mod: 91

## 2017-10-19 PROCEDURE — 88309 TISSUE EXAM BY PATHOLOGIST: CPT | Mod: 26,,, | Performed by: PATHOLOGY

## 2017-10-19 PROCEDURE — 85384 FIBRINOGEN ACTIVITY: CPT | Mod: 91

## 2017-10-19 PROCEDURE — 82040 ASSAY OF SERUM ALBUMIN: CPT | Mod: 91

## 2017-10-19 PROCEDURE — P9012 CRYOPRECIPITATE EACH UNIT: HCPCS

## 2017-10-19 PROCEDURE — 27100025 HC TUBING, SET FLUID WARMER: Mod: NTX | Performed by: NURSE ANESTHETIST, CERTIFIED REGISTERED

## 2017-10-19 PROCEDURE — 25000003 PHARM REV CODE 250: Performed by: NURSE ANESTHETIST, CERTIFIED REGISTERED

## 2017-10-19 PROCEDURE — 84460 ALANINE AMINO (ALT) (SGPT): CPT

## 2017-10-19 PROCEDURE — 27800506 HC CATH, RAPID INFUSION (7.5&8.5): Mod: NTX | Performed by: NURSE ANESTHETIST, CERTIFIED REGISTERED

## 2017-10-19 PROCEDURE — 36556 INSERT NON-TUNNEL CV CATH: CPT | Mod: 59,,, | Performed by: ANESTHESIOLOGY

## 2017-10-19 PROCEDURE — 12000002 HC ACUTE/MED SURGE SEMI-PRIVATE ROOM: Mod: NTX

## 2017-10-19 PROCEDURE — 76937 US GUIDE VASCULAR ACCESS: CPT | Mod: 26,,, | Performed by: ANESTHESIOLOGY

## 2017-10-19 PROCEDURE — 36000930 HC OR TIME LEV VII 1ST 15 MIN: Performed by: TRANSPLANT SURGERY

## 2017-10-19 PROCEDURE — D9220A PRA ANESTHESIA: Mod: ,,, | Performed by: ANESTHESIOLOGY

## 2017-10-19 PROCEDURE — 36415 COLL VENOUS BLD VENIPUNCTURE: CPT | Mod: NTX

## 2017-10-19 PROCEDURE — 27800505 HC CATH, RADIAL ARTERY KIT: Mod: NTX | Performed by: NURSE ANESTHETIST, CERTIFIED REGISTERED

## 2017-10-19 PROCEDURE — 47135 TRANSPLANTATION OF LIVER: CPT | Mod: 82,,, | Performed by: TRANSPLANT SURGERY

## 2017-10-19 PROCEDURE — 87102 FUNGUS ISOLATION CULTURE: CPT

## 2017-10-19 PROCEDURE — 88313 SPECIAL STAINS GROUP 2: CPT | Mod: 26,,, | Performed by: PATHOLOGY

## 2017-10-19 PROCEDURE — P9045 ALBUMIN (HUMAN), 5%, 250 ML: HCPCS | Performed by: TRANSPLANT SURGERY

## 2017-10-19 PROCEDURE — 84450 TRANSFERASE (AST) (SGOT): CPT

## 2017-10-19 PROCEDURE — 85049 AUTOMATED PLATELET COUNT: CPT | Mod: 91

## 2017-10-19 PROCEDURE — C1729 CATH, DRAINAGE: HCPCS | Performed by: TRANSPLANT SURGERY

## 2017-10-19 PROCEDURE — 84132 ASSAY OF SERUM POTASSIUM: CPT | Mod: 91

## 2017-10-19 PROCEDURE — P9035 PLATELET PHERES LEUKOREDUCED: HCPCS

## 2017-10-19 PROCEDURE — 80053 COMPREHEN METABOLIC PANEL: CPT | Mod: NTX

## 2017-10-19 PROCEDURE — 85025 COMPLETE CBC W/AUTO DIFF WBC: CPT | Mod: NTX

## 2017-10-19 PROCEDURE — 25000003 PHARM REV CODE 250: Mod: NTX | Performed by: HOSPITALIST

## 2017-10-19 PROCEDURE — 88307 TISSUE EXAM BY PATHOLOGIST: CPT | Performed by: PATHOLOGY

## 2017-10-19 PROCEDURE — 63600175 PHARM REV CODE 636 W HCPCS: Mod: NTX | Performed by: HOSPITALIST

## 2017-10-19 PROCEDURE — 87070 CULTURE OTHR SPECIMN AEROBIC: CPT

## 2017-10-19 PROCEDURE — 63600175 PHARM REV CODE 636 W HCPCS: Mod: NTX | Performed by: ANESTHESIOLOGY

## 2017-10-19 PROCEDURE — 85018 HEMOGLOBIN: CPT

## 2017-10-19 PROCEDURE — 93503 INSERT/PLACE HEART CATHETER: CPT | Mod: 59,,, | Performed by: ANESTHESIOLOGY

## 2017-10-19 PROCEDURE — 27100021 HC MULTIPORT INFUSION MANIFOLD: Mod: NTX | Performed by: NURSE ANESTHETIST, CERTIFIED REGISTERED

## 2017-10-19 PROCEDURE — 82330 ASSAY OF CALCIUM: CPT

## 2017-10-19 PROCEDURE — 27201423 OPTIME MED/SURG SUP & DEVICES STERILE SUPPLY: Performed by: TRANSPLANT SURGERY

## 2017-10-19 PROCEDURE — 27000174 HC ADDITIONAL TIME, PER HR

## 2017-10-19 PROCEDURE — 97530 THERAPEUTIC ACTIVITIES: CPT | Mod: NTX

## 2017-10-19 PROCEDURE — 25000003 PHARM REV CODE 250: Mod: NTX | Performed by: STUDENT IN AN ORGANIZED HEALTH CARE EDUCATION/TRAINING PROGRAM

## 2017-10-19 PROCEDURE — 27200656 HC CATH, FEMORAL ARTERY: Mod: NTX | Performed by: NURSE ANESTHETIST, CERTIFIED REGISTERED

## 2017-10-19 PROCEDURE — 37000008 HC ANESTHESIA 1ST 15 MINUTES: Performed by: TRANSPLANT SURGERY

## 2017-10-19 PROCEDURE — 87075 CULTR BACTERIA EXCEPT BLOOD: CPT

## 2017-10-19 PROCEDURE — 63600175 PHARM REV CODE 636 W HCPCS: Mod: NTX | Performed by: STUDENT IN AN ORGANIZED HEALTH CARE EDUCATION/TRAINING PROGRAM

## 2017-10-19 PROCEDURE — 63600175 PHARM REV CODE 636 W HCPCS: Performed by: TRANSPLANT SURGERY

## 2017-10-19 PROCEDURE — 36620 INSERTION CATHETER ARTERY: CPT | Mod: 59,,, | Performed by: ANESTHESIOLOGY

## 2017-10-19 PROCEDURE — 85014 HEMATOCRIT: CPT

## 2017-10-19 PROCEDURE — 85014 HEMATOCRIT: CPT | Mod: 91

## 2017-10-19 PROCEDURE — 85018 HEMOGLOBIN: CPT | Mod: 91

## 2017-10-19 RX ORDER — MEROPENEM AND SODIUM CHLORIDE 1 G/50ML
1 INJECTION, SOLUTION INTRAVENOUS
Status: DISCONTINUED | OUTPATIENT
Start: 2017-10-19 | End: 2017-10-23

## 2017-10-19 RX ORDER — FENTANYL CITRATE 50 UG/ML
INJECTION, SOLUTION INTRAMUSCULAR; INTRAVENOUS
Status: DISCONTINUED | OUTPATIENT
Start: 2017-10-19 | End: 2017-10-20

## 2017-10-19 RX ORDER — NITROGLYCERIN 20 MG/100ML
INJECTION INTRAVENOUS CONTINUOUS PRN
Status: DISCONTINUED | OUTPATIENT
Start: 2017-10-19 | End: 2017-10-20

## 2017-10-19 RX ORDER — ALBUMIN HUMAN 50 G/1000ML
SOLUTION INTRAVENOUS CONTINUOUS PRN
Status: DISCONTINUED | OUTPATIENT
Start: 2017-10-19 | End: 2017-10-20

## 2017-10-19 RX ORDER — PHENYLEPHRINE HYDROCHLORIDE 10 MG/ML
INJECTION INTRAVENOUS
Status: DISCONTINUED | OUTPATIENT
Start: 2017-10-19 | End: 2017-10-20

## 2017-10-19 RX ORDER — FUROSEMIDE 10 MG/ML
INJECTION INTRAMUSCULAR; INTRAVENOUS
Status: DISCONTINUED | OUTPATIENT
Start: 2017-10-19 | End: 2017-10-20

## 2017-10-19 RX ORDER — SODIUM BICARBONATE 42 MG/ML
INJECTION, SOLUTION INTRAVENOUS
Status: DISCONTINUED | OUTPATIENT
Start: 2017-10-19 | End: 2017-10-20

## 2017-10-19 RX ORDER — HEPARIN SODIUM 1000 [USP'U]/ML
INJECTION, SOLUTION INTRAVENOUS; SUBCUTANEOUS
Status: DISCONTINUED | OUTPATIENT
Start: 2017-10-19 | End: 2017-10-20

## 2017-10-19 RX ORDER — SUCCINYLCHOLINE CHLORIDE 20 MG/ML
INJECTION INTRAMUSCULAR; INTRAVENOUS
Status: DISCONTINUED | OUTPATIENT
Start: 2017-10-19 | End: 2017-10-20

## 2017-10-19 RX ORDER — ROCURONIUM BROMIDE 10 MG/ML
INJECTION, SOLUTION INTRAVENOUS
Status: DISCONTINUED | OUTPATIENT
Start: 2017-10-19 | End: 2017-10-20

## 2017-10-19 RX ORDER — PANTOPRAZOLE SODIUM 40 MG/10ML
40 INJECTION, POWDER, LYOPHILIZED, FOR SOLUTION INTRAVENOUS ONCE
Status: COMPLETED | OUTPATIENT
Start: 2017-10-19 | End: 2017-10-19

## 2017-10-19 RX ORDER — HEPARIN SODIUM 1000 [USP'U]/ML
INJECTION, SOLUTION INTRAVENOUS; SUBCUTANEOUS
Status: DISCONTINUED | OUTPATIENT
Start: 2017-10-19 | End: 2017-10-20 | Stop reason: HOSPADM

## 2017-10-19 RX ORDER — ALBUMIN HUMAN 50 G/1000ML
SOLUTION INTRAVENOUS CONTINUOUS PRN
Status: DISCONTINUED | OUTPATIENT
Start: 2017-10-19 | End: 2017-10-20 | Stop reason: HOSPADM

## 2017-10-19 RX ORDER — MANNITOL 250 MG/ML
INJECTION, SOLUTION INTRAVENOUS
Status: DISCONTINUED | OUTPATIENT
Start: 2017-10-19 | End: 2017-10-20

## 2017-10-19 RX ORDER — ONDANSETRON 2 MG/ML
INJECTION INTRAMUSCULAR; INTRAVENOUS
Status: DISCONTINUED | OUTPATIENT
Start: 2017-10-19 | End: 2017-10-20

## 2017-10-19 RX ORDER — LIDOCAINE HCL/PF 100 MG/5ML
SYRINGE (ML) INTRAVENOUS
Status: DISCONTINUED | OUTPATIENT
Start: 2017-10-19 | End: 2017-10-20

## 2017-10-19 RX ORDER — MIDAZOLAM HYDROCHLORIDE 1 MG/ML
INJECTION, SOLUTION INTRAMUSCULAR; INTRAVENOUS
Status: DISCONTINUED | OUTPATIENT
Start: 2017-10-19 | End: 2017-10-20

## 2017-10-19 RX ORDER — PROPOFOL 10 MG/ML
VIAL (ML) INTRAVENOUS
Status: DISCONTINUED | OUTPATIENT
Start: 2017-10-19 | End: 2017-10-20

## 2017-10-19 RX ORDER — PAPAVERINE HYDROCHLORIDE 30 MG/ML
INJECTION INTRAMUSCULAR; INTRAVENOUS
Status: DISCONTINUED | OUTPATIENT
Start: 2017-10-19 | End: 2017-10-20 | Stop reason: HOSPADM

## 2017-10-19 RX ADMIN — RIFAXIMIN 550 MG: 550 TABLET ORAL at 09:10

## 2017-10-19 RX ADMIN — VASOPRESSIN 3 UNITS: 20 INJECTION, SOLUTION INTRAMUSCULAR; SUBCUTANEOUS at 11:10

## 2017-10-19 RX ADMIN — CALCIUM CHLORIDE 1000 MG: 100 INJECTION, SOLUTION INTRAVENOUS at 10:10

## 2017-10-19 RX ADMIN — RIFAXIMIN 550 MG: 550 TABLET ORAL at 12:10

## 2017-10-19 RX ADMIN — MEROPENEM AND SODIUM CHLORIDE 1 G: 1 INJECTION, SOLUTION INTRAVENOUS at 09:10

## 2017-10-19 RX ADMIN — SUCRALFATE 1 G: 1 SUSPENSION ORAL at 12:10

## 2017-10-19 RX ADMIN — NITROGLYCERIN 0.5 MCG/KG/MIN: 200 INJECTION, SOLUTION INTRAVENOUS at 07:10

## 2017-10-19 RX ADMIN — ALBUMIN (HUMAN): 12.5 SOLUTION INTRAVENOUS at 08:10

## 2017-10-19 RX ADMIN — MANNITOL 100 ML: 250 INJECTION, SOLUTION INTRAVENOUS at 07:10

## 2017-10-19 RX ADMIN — FENTANYL CITRATE 300 MCG: 50 INJECTION, SOLUTION INTRAMUSCULAR; INTRAVENOUS at 11:10

## 2017-10-19 RX ADMIN — FENTANYL CITRATE 100 MCG: 50 INJECTION, SOLUTION INTRAMUSCULAR; INTRAVENOUS at 05:10

## 2017-10-19 RX ADMIN — SODIUM CHLORIDE, SODIUM GLUCONATE, SODIUM ACETATE, POTASSIUM CHLORIDE, MAGNESIUM CHLORIDE, SODIUM PHOSPHATE, DIBASIC, AND POTASSIUM PHOSPHATE: .53; .5; .37; .037; .03; .012; .00082 INJECTION, SOLUTION INTRAVENOUS at 05:10

## 2017-10-19 RX ADMIN — FUROSEMIDE 100 MG: 10 INJECTION, SOLUTION INTRAMUSCULAR; INTRAVENOUS at 10:10

## 2017-10-19 RX ADMIN — MIDAZOLAM HYDROCHLORIDE 2 MG: 1 INJECTION, SOLUTION INTRAMUSCULAR; INTRAVENOUS at 05:10

## 2017-10-19 RX ADMIN — LIDOCAINE HYDROCHLORIDE 100 MG: 20 INJECTION, SOLUTION INTRAVENOUS at 05:10

## 2017-10-19 RX ADMIN — VASOPRESSIN 2 UNITS/HR: 20 INJECTION, SOLUTION INTRAMUSCULAR; SUBCUTANEOUS at 07:10

## 2017-10-19 RX ADMIN — HEPARIN SODIUM 2000 UNITS: 1000 INJECTION, SOLUTION INTRAVENOUS; SUBCUTANEOUS at 09:10

## 2017-10-19 RX ADMIN — VASOPRESSIN 3 UNITS: 20 INJECTION, SOLUTION INTRAMUSCULAR; SUBCUTANEOUS at 06:10

## 2017-10-19 RX ADMIN — SODIUM CHLORIDE, SODIUM GLUCONATE, SODIUM ACETATE, POTASSIUM CHLORIDE, MAGNESIUM CHLORIDE, SODIUM PHOSPHATE, DIBASIC, AND POTASSIUM PHOSPHATE: .53; .5; .37; .037; .03; .012; .00082 INJECTION, SOLUTION INTRAVENOUS at 10:10

## 2017-10-19 RX ADMIN — PANTOPRAZOLE SODIUM 40 MG: 40 INJECTION, POWDER, FOR SOLUTION INTRAVENOUS at 09:10

## 2017-10-19 RX ADMIN — PHENYLEPHRINE HYDROCHLORIDE 300 MCG: 10 INJECTION INTRAVENOUS at 09:10

## 2017-10-19 RX ADMIN — PROPOFOL 130 MG: 10 INJECTION, EMULSION INTRAVENOUS at 05:10

## 2017-10-19 RX ADMIN — FUROSEMIDE 100 MG: 10 INJECTION, SOLUTION INTRAMUSCULAR; INTRAVENOUS at 07:10

## 2017-10-19 RX ADMIN — OCTREOTIDE ACETATE 100 MCG: 100 INJECTION, SOLUTION INTRAVENOUS; SUBCUTANEOUS at 12:10

## 2017-10-19 RX ADMIN — PHENYLEPHRINE HYDROCHLORIDE 0.25 MCG/KG/MIN: 10 INJECTION INTRAVENOUS at 07:10

## 2017-10-19 RX ADMIN — AMPICILLIN SODIUM AND SULBACTAM SODIUM 3 G: 2; 1 INJECTION, POWDER, FOR SOLUTION INTRAMUSCULAR; INTRAVENOUS at 07:10

## 2017-10-19 RX ADMIN — ROCURONIUM BROMIDE 5 MG: 10 INJECTION, SOLUTION INTRAVENOUS at 05:10

## 2017-10-19 RX ADMIN — CALCIUM CHLORIDE 1000 MG: 100 INJECTION, SOLUTION INTRAVENOUS at 08:10

## 2017-10-19 RX ADMIN — SUCCINYLCHOLINE CHLORIDE 140 MG: 20 INJECTION, SOLUTION INTRAMUSCULAR; INTRAVENOUS at 05:10

## 2017-10-19 RX ADMIN — SODIUM BICARBONATE 50 MEQ: 42 INJECTION, SOLUTION INTRAVENOUS at 09:10

## 2017-10-19 RX ADMIN — SODIUM CHLORIDE TAB 1 GM 1 G: 1 TAB at 12:10

## 2017-10-19 RX ADMIN — SODIUM CHLORIDE, SODIUM GLUCONATE, SODIUM ACETATE, POTASSIUM CHLORIDE, MAGNESIUM CHLORIDE, SODIUM PHOSPHATE, DIBASIC, AND POTASSIUM PHOSPHATE: .53; .5; .37; .037; .03; .012; .00082 INJECTION, SOLUTION INTRAVENOUS at 11:10

## 2017-10-19 RX ADMIN — MANNITOL 100 ML: 250 INJECTION, SOLUTION INTRAVENOUS at 10:10

## 2017-10-19 RX ADMIN — ALBUMIN (HUMAN): 12.5 SOLUTION INTRAVENOUS at 09:10

## 2017-10-19 RX ADMIN — ROCURONIUM BROMIDE 50 MG: 10 INJECTION, SOLUTION INTRAVENOUS at 07:10

## 2017-10-19 RX ADMIN — VASOPRESSIN 2 UNITS: 20 INJECTION, SOLUTION INTRAMUSCULAR; SUBCUTANEOUS at 08:10

## 2017-10-19 RX ADMIN — ROCURONIUM BROMIDE 45 MG: 10 INJECTION, SOLUTION INTRAVENOUS at 05:10

## 2017-10-19 RX ADMIN — ROCURONIUM BROMIDE 40 MG: 10 INJECTION, SOLUTION INTRAVENOUS at 11:10

## 2017-10-19 RX ADMIN — AMPICILLIN SODIUM AND SULBACTAM SODIUM 3 G: 2; 1 INJECTION, POWDER, FOR SOLUTION INTRAMUSCULAR; INTRAVENOUS at 11:10

## 2017-10-19 RX ADMIN — SODIUM BICARBONATE 650 MG TABLET 1950 MG: at 12:10

## 2017-10-19 RX ADMIN — VASOPRESSIN 4 UNITS: 20 INJECTION, SOLUTION INTRAMUSCULAR; SUBCUTANEOUS at 09:10

## 2017-10-19 RX ADMIN — FLUCONAZOLE 200 MG: 200 TABLET ORAL at 09:10

## 2017-10-19 RX ADMIN — METHYLPREDNISOLONE SODIUM SUCCINATE 500 MG: 500 INJECTION, POWDER, FOR SOLUTION INTRAMUSCULAR; INTRAVENOUS at 07:10

## 2017-10-19 RX ADMIN — FENTANYL CITRATE 100 MCG: 50 INJECTION, SOLUTION INTRAMUSCULAR; INTRAVENOUS at 07:10

## 2017-10-19 NOTE — ASSESSMENT & PLAN NOTE
UOP stable with drop in creatine to 2.3  - likely ischemic ATN from decreased renal perfusion (volume shifts from paracentesis, anemia)  - urine sediment with muddy brown casts  - associated with non-anion gap metabolic acidosis and hyponatremia  - agree with holding diuretics  - Continue with NaBicarb supplementation to correct acidosis  - Continue octreotide for now   - consider midodrine if BP does not improve   - Scheduled for Transplant today, Intraop dialysis orders placed by fellow.    - Will continue to follow

## 2017-10-19 NOTE — ASSESSMENT & PLAN NOTE
- WBC peaked 10/6 at 31K. Remains elevated around 16K.   - blood and urine culture 10/13 NGTD. Paracentesis with non-infected fluid.

## 2017-10-19 NOTE — SUBJECTIVE & OBJECTIVE
Scheduled Meds:   fluconazole  200 mg Oral Daily    methylPREDNISolone sodium succinate  500 mg Intravenous Once    octreotide  100 mcg Intravenous Q8H    pantoprazole  40 mg Oral BID AC    rifAXImin  550 mg Oral BID    sodium bicarbonate  1,950 mg Per NG tube TID    sodium chloride  1 g Oral QID    sucralfate  1 g Oral Q6H     Continuous Infusions:   PRN Meds:sodium chloride, sodium chloride, ampicillin-sulbactim (UNASYN) IVPB, hepatitis B virus vacc.rec(PF), meropenem (MERREM) IVPB, mupirocin, ondansetron    Review of Systems   Constitutional: Positive for appetite change. Negative for activity change, chills, fatigue and fever.   HENT: Negative for sore throat and trouble swallowing.    Eyes: Negative for photophobia and visual disturbance.   Respiratory: Negative for cough, shortness of breath and wheezing.    Cardiovascular: Negative for chest pain, palpitations and leg swelling.   Gastrointestinal: Positive for abdominal distention and abdominal pain. Negative for constipation, diarrhea, nausea and vomiting.   Endocrine: Negative for cold intolerance and heat intolerance.   Genitourinary: Positive for scrotal swelling. Negative for dysuria and frequency.   Musculoskeletal: Negative for arthralgias and myalgias.   Skin: Positive for color change (jaundice). Negative for rash and wound.   Neurological: Negative for dizziness, syncope, weakness and light-headedness.   Psychiatric/Behavioral: Positive for sleep disturbance. Negative for confusion and hallucinations.   All other systems reviewed and are negative.    Objective:     Vital Signs (Most Recent):  Temp: 98.4 °F (36.9 °C) (10/19/17 1154)  Pulse: 97 (10/19/17 1154)  Resp: 18 (10/19/17 1154)  BP: (!) 115/55 (10/19/17 1154)  SpO2: 96 % (10/19/17 1154) Vital Signs (24h Range):  Temp:  [98.1 °F (36.7 °C)-98.8 °F (37.1 °C)] 98.4 °F (36.9 °C)  Pulse:  [93-97] 97  Resp:  [18] 18  SpO2:  [94 %-99 %] 96 %  BP: (107-116)/(53-58) 115/55     Weight: 96.6 kg  (212 lb 15.4 oz)  Body mass index is 33.35 kg/m².    Intake/Output - Last 3 Shifts       10/17 0700 - 10/18 0659 10/18 0700 - 10/19 0659 10/19 0700 - 10/20 0659    P.O. 900 540 0    I.V. (mL/kg)  10 (0.1)     Other  0     Total Intake(mL/kg) 900 (9.3) 550 (5.7) 0 (0)    Urine (mL/kg/hr) 620 (0.3) 990 (0.4)     Emesis/NG output 0 (0) 0 (0)     Other 0 (0) 0 (0)     Stool 0 (0) 0 (0)     Blood 0 (0) 0 (0)     Total Output 620 990      Net +280 -440 0           Urine Occurrence 0 x 0 x     Stool Occurrence 0 x 3 x     Emesis Occurrence 0 x 0 x           Physical Exam   Constitutional: He is oriented to person, place, and time. He appears well-developed and well-nourished. He is cooperative.  Non-toxic appearance. No distress.   HENT:   Head: Normocephalic and atraumatic.   Mouth/Throat: Oropharynx is clear and moist.   Eyes: EOM are normal. Pupils are equal, round, and reactive to light. Scleral icterus is present.   Neck: Normal range of motion. Neck supple.   Cardiovascular: Regular rhythm, normal heart sounds, intact distal pulses and normal pulses.    No murmur heard.  Pulmonary/Chest: Effort normal. No respiratory distress. He has decreased breath sounds in the right lower field and the left lower field. He has no wheezes. He has no rales.   Abdominal: Soft. Normal appearance and bowel sounds are normal. He exhibits distension and ascites. He exhibits no mass. There is no tenderness. There is no rebound and no guarding. No hernia.   Musculoskeletal: Normal range of motion. He exhibits edema (+2/+3 generalized).   Lymphadenopathy:     He has no cervical adenopathy.   Neurological: He is alert and oriented to person, place, and time. He is not disoriented.   Skin: Skin is warm and dry. He is not diaphoretic.   Old blister/ wart on right plantar surface of the foot near the big toe  Jaundice   Psychiatric: He has a normal mood and affect. His behavior is normal. His affect is not labile. His speech is delayed.  Cognition and memory are not impaired.   Nursing note and vitals reviewed.      Laboratory:  Immunosuppressants     None        CBC:     Recent Labs  Lab 10/19/17  0519   WBC 16.09*   RBC 2.18*   HGB 7.9*   HCT 22.0*      *   MCH 36.2*   MCHC 35.9     CMP:     Recent Labs  Lab 10/19/17  0519   *  120*   CALCIUM 8.7  8.7   ALBUMIN 2.8*  2.8*   PROT 5.6*   *  128*   K 3.4*  3.4*   CO2 16*  16*   CL 97  97   BUN 82*  82*   CREATININE 2.3*  2.3*   ALKPHOS 130   ALT 77*   *   BILITOT 31.0*     Coagulation:     Recent Labs  Lab 10/18/17  1246 10/19/17  0519   INR 1.7* 1.8*   APTT 38.6*  --      Labs within the past 24 hours have been reviewed.    Diagnostic Results:  I have personally reviewed all pertinent imaging studies.

## 2017-10-19 NOTE — ASSESSMENT & PLAN NOTE
- nephrology following, suspect LAURA multifactorial: ischemic ATN given hypotension and injury due to hyperbilirubinemia. Felt less likely to be HRS  - Cr improved today, good uop. Holding diuretics. Will likely need intra-op HD for transplant surgery.

## 2017-10-19 NOTE — PROGRESS NOTES
Pre-operative Discussion Note  Liver Transplant Surgery    Jhonny Diana is a 27 y.o. male admitted for liver transplant.  I discussed the planned procedure in detail, including expected hospital course and outcomes, benefits, risks, and potential complications.  Complications discussed included death, graft failure, bleeding, infection, rejection, and neurologic problems.  I discussed the risks of anesthesia, as well as the potential need for re-operation.  The possibility of other complications not specifically mentioned was also discussed.  Also, I discussed the need for lifelong immunosuppression and the possibility of serious complications from immunosuppressive drugs.    The discussion included the risks that the patient will incur if he elects to not have the proposed procedure.    Relevant donor-specific risk factors were disclosed and discussed with the patient, including:       Specific PHS Increased Risk Behavior criteria for the organ donor include:  None    HCV Recurrence: Not applicable.    Hepatocellular Carcinoma Recurrence: Not applicable.    All questions were answered.  The patient and available family members voice understanding and agree to proceed with the transplant.    UNOS Patient Status  Note on scores:  ICU = 10 = total assistance  TSU = 20-30 = partial assistance  Outpatient admitted for transplant requiring medical care in last year = 40-50 = partial assistance  Scores 60 or higher indicate no assistance, meaning no need for medical care in last year. This would be very unusual for a transplant candidate.    Functional Status: 20% - Very sick, hospitalization necessary: active treatment necessary  Physical Capacity: Wheelchair bound or more limited

## 2017-10-19 NOTE — PROGRESS NOTES
1st Transplant Note:  SW met with pt, pt's mother Sanjuanita Diana and pt's aunt Sophy Aragon in order to provide support and resources as pt called in a second time for a liver transplant. Pt was alert and oriented x 4 but did appear somewhat confused and slowed. Pt was utilizing oxygen at time of visit. Pt and family appear to be coping well at this time. Pt's mother reports that she will plan to stay in room with pt while in the hospital. Pt's mother reports that pt's transplant was canceled yesterday but that pt and family hopeful as pt received call for transplant this morning. Pt's mother reports that she is happier with this liver as she states that it was not described as this liver is not high risk. Pt's mother reports that she spoke with pt's employer at [employer] yesterday and that [employer] will be covering pt's insurance premium for the time being. Pt's aunt recommended that mother get something from the company stating this in writing. SW supportive of this as well. Pt's mother reports that she also left a message with pt's STD, Acucar Guarani, to find out how much pt will be paid per check. Mother still waiting on f/u from [employer]. Pt's mother given LR financial profile to fill out a long with information on the LR. Pt and family denied any further questions or concerns at this time. SW remains available for continued psychosocial support, education, resources, and additional d/c planning as needed.

## 2017-10-19 NOTE — ANESTHESIA PROCEDURE NOTES
Arterial    Diagnosis: ESLD    Patient location during procedure: done in OR  Procedure start time: 10/19/2017 5:55 PM  Timeout: 10/19/2017 5:55 PM  Procedure end time: 10/19/2017 5:55 PM  Staffing  Anesthesiologist: KENDRA COLBY  Resident/CRNA: SYLVIA STEINER  Performed: resident/CRNA   Anesthesiologist was present at the time of the procedure.  Preanesthetic Checklist  Completed: patient identified, site marked, surgical consent, pre-op evaluation, timeout performed, IV checked, risks and benefits discussed, monitors and equipment checked and anesthesia consent givenArterial  Skin Prep: chlorhexidine gluconate  Local Infiltration: topical anesthetic  Orientation: left  Location: radial  Catheter Size: 20 G  Catheter placement by Anatomical landmarks. Heme positive aspiration all ports.Insertion Attempts: 1  Assessment  Dressing: secured with tape and tegaderm

## 2017-10-19 NOTE — ANESTHESIA PREPROCEDURE EVALUATION
[facility]  Anesthesia Pre-Operative Evaluation         Patient Name: Jhonny Diana  YOB: 1990  MRN: [id number]    SUBJECTIVE:     Pre-operative evaluation for Procedure(s) (LRB):  TRANSPLANT-LIVER will need intraop dialysis      10/19/2017    Jhonny Diana is a 27 y.o. male w/ a significant PMHx of childhood asthma, ESLD 2/2 to EtOH abuse (1/5 of liquor daily since he was a teenager), LAURA on CKD.    Patient now presents for the above procedure(s).      LDA:     Midline Catheter Insertion/Assessment  - Single Lumen 09/27/17 1800 Right   Midline Catheter Insertion/Assessment - [employer] ([employer]) Placement Date/Time: 09/27/17 1800 Present Prior to Hospital Arrival?: Yes Side: (c) Right Assess Remove Now        Wound 10/08/17 0000 Blister(s) lower lip   Wound Properties Date First Assessed/Time First Assessed: 10/08/17 0000 Pre-existing: Yes Wound Type: Blister(s) Side: Left Orientation: lower Location: lip Assess Remove Now        Incision/Site 10/12/17 0911 Right abdomen laparoscopic puncture   Incision Properties Date First Assessed/Time First Assessed: 10/12/17 0911 Side: Right Location: abdomen Incision Type: (c) laparoscopic puncture Assess Remove Now       Prev airway: None documented.    Drips: None documented.      Patient Active Problem List   Diagnosis    Alcoholic hepatitis with ascites    Alcoholism /alcohol abuse    Hepatic encephalopathy    Acute renal failure with tubular necrosis    History of hematemesis    Hyponatremia    Increased anion gap metabolic acidosis    Anasarca    Decompensated hepatic cirrhosis with ascites and HE    Acute alcoholic pancreatitis    Hypokalemia    Metabolic acidosis with normal anion gap and bicarbonate losses    Leukocytosis    Coagulopathy    Severe malnutrition    Tachycardia    Ascites due to alcoholic cirrhosis    LAURA (acute kidney injury)    Acute blood loss anemia       Review of patient's allergies indicates:    Allergen Reactions    Bactrim [sulfamethoxazole-trimethoprim] Other (See Comments)     Mookie Trell Syndrome       Current Inpatient Medications:      Current Facility-Administered Medications on File Prior to Visit   Medication Dose Route Frequency Provider Last Rate Last Dose    0.9%  NaCl infusion (CRRT USE ONLY)   Intravenous PRN Solis Snyder MD        0.9%  NaCl infusion (for blood administration)   Intravenous Q24H PRN Hoda Smith MD        0.9%  NaCl infusion (for blood administration)   Intravenous Q24H PRN Hoda Smith MD        ampicillin-sulbactam 3 g in sodium chloride 0.9 % 100 mL IVPB (ready to mix system)  3 g Intravenous On Call Procedure Hoda Smith MD        fluconazole tablet 200 mg  200 mg Oral Daily Armand Carrasco MD   200 mg at 10/18/17 0904    hepatitis B virus vacc.rec(PF) injection 40 mcg  40 mcg Intramuscular vaccine x 1 dose Marcela Longoria MD   40 mcg at 10/12/17 2012    magnesium sulfate 2g in water 50mL IVPB (premix)  2 g Intravenous PRN Solis Snyder MD        meropenem-0.9% sodium chloride 1 g/50 mL IVPB  1 g Intravenous On Call Procedure Hoda Smith MD        methylPREDNISolone sodium succinate injection 500 mg  500 mg Intravenous Once Hoda Smith MD        mupirocin 2 % ointment   Nasal On Call Procedure Hoda Smith MD        octreotide injection 100 mcg  100 mcg Intravenous Q8H Armand Carrasco MD   100 mcg at 10/19/17 0041    ondansetron injection 4 mg  4 mg Intravenous Q4H PRN Armand Carrasco MD   4 mg at 10/17/17 1411    pantoprazole EC tablet 40 mg  40 mg Oral BID AC Mi Braun MD   Stopped at 10/18/17 1615    rifAXIMin tablet 550 mg  550 mg Oral BID Sajan Sandra MD   550 mg at 10/19/17 0041    sodium bicarbonate tablet 1,950 mg  1,950 mg Per NG tube TID Moon Higgins MD   1,950 mg at 10/19/17 0041    sodium chloride tablet 1 g  1 g Oral QID Armand  MD Danilo   1 g at 10/19/17 0047    sucralfate 100 mg/mL suspension 1 g  1 g Oral Q6H Divya Booker MD   1 g at 10/19/17 0045     No current outpatient prescriptions on file prior to visit.       Past Surgical History:   Procedure Laterality Date    APPENDECTOMY         Social History     Social History    Marital status: Single     Spouse name: N/A    Number of children: N/A    Years of education: N/A     Occupational History    Not on file.     Social History Main Topics    Smoking status: Former Smoker     Types: Cigarettes    Smokeless tobacco: Never Used    Alcohol use Yes      Comment: a fifth of liquor daily for years, cut back over last 2 months    Drug use: No    Sexual activity: Not on file     Other Topics Concern    Not on file     Social History Narrative    No narrative on file       OBJECTIVE:     Vital Signs Range (Last 24H):  Temp:  [36.7 °C (98.1 °F)-37.2 °C (98.9 °F)]   Pulse:  []   Resp:  [16-18]   BP: ()/(49-58)   SpO2:  [94 %-99 %]       CBC:   Recent Labs      10/18/17   0510  10/18/17   1246   WBC  15.48*  15.17*   RBC  2.16*  2.15*   HGB  7.7*  7.6*   HCT  21.1*  20.9*   PLT  145*  154   MCV  98  97   MCH  35.6*  35.3*   MCHC  36.5*  36.4*       CMP:   Recent Labs      10/18/17   0510  10/18/17   1246  10/18/17   1357  10/18/17   1602  10/18/17   2301  10/18/17   2302   NA  127*  128*  128*   --   132*   --    K  2.9*  3.2*  3.2*   --   3.3*   --    CL  95  95  97   --   99   --    CO2  16*  18*  16*   --   17*   --    BUN  86*  88*  85*   --   87*   --    CREATININE  2.5*  2.6*  2.5*   --   2.4*   --    GLU  117*  122*  118*   --   112*   --    MG  2.1  2.1   --   2.1   --   2.1   PHOS  3.6   --   3.6   --   3.8   --    CALCIUM  9.0  8.8  8.8   --   8.8   --    ALBUMIN  2.9*  3.0*  2.8*   --   2.9*   --    PROT  5.7*  5.7*   --    --    --    --    ALKPHOS  125  123   --    --    --    --    ALT  77*  78*   --    --    --    --    AST  174*  176*   --    --    --     --    BILITOT  33.2*  33.1*   --    --    --    --        INR:  Recent Labs      10/17/17   0527  10/18/17   0510  10/18/17   1246   INR  1.6*  1.7*  1.7*   APTT   --    --   38.6*       Diagnostic Studies: No relevant studies.    EK-OCT-2017 12:50:50   Normal sinus rhythm  Normal ECG  When compared with ECG of 28-SEP-2017 02:03,  Minimal criteria for Inferior infarct are no longer Present  T wave inversion less evident in Inferior leads    Vent. rate 94 BPM  CO interval 162 ms  QRS duration 96 ms  QT/QTc 374/467 ms  P-R-T axes 59 57 33    2D ECHO:  Results for orders placed or performed during the hospital encounter of 17   2D echo with color flow doppler   Result Value Ref Range    EF 60 55 - 65    Mitral Valve Regurgitation MODERATE (A)     Diastolic Dysfunction No     Est. PA Systolic Pressure 29.42     Pericardial Effusion TRIVIAL     Mitral Valve Mobility NORMAL      CONCLUSIONS     1 - Normal left ventricular systolic function (EF 60-65%).     2 - Normal left ventricular diastolic function.     3 - Normal right ventricular systolic function .     4 - The estimated PA systolic pressure is 29 mmHg.     5 - Moderate mitral regurgitation.     6 - Trivial pericardial effusion.     ASSESSMENT/PLAN:       Pre-op Assessment    I have reviewed the Patient Summary Reports.      I have reviewed the Medications.     Review of Systems  Anesthesia Hx:  No problems with previous Anesthesia  Denies Family Hx of Anesthesia complications.   Denies Personal Hx of Anesthesia complications.   Social:  Non-Smoker, Social Alcohol Use    Hematology/Oncology:        Denies Current/Recent Cancer   EENT/Dental:   denies chronic allergic rhinitis   Cardiovascular:   Exercise tolerance: good Denies Hypertension.  Denies MI.  Denies CAD.    Denies CABG/stent.  Denies Dysrhythmias.     Pulmonary:   Denies Pneumonia Denies Asthma.  Denies Shortness of breath.  Denies Recent URI.    Renal/:   Denies Chronic Renal Disease.      Hepatic/GI:   Denies PUD. Denies Hiatal Hernia.  Denies GERD.    Neurological:   Denies TIA. Denies CVA. Denies Seizures.    Endocrine:   Denies Diabetes.    Psych:   Denies Psychiatric History.          Physical Exam  General:  Well nourished    Airway/Jaw/Neck:  Airway Findings: Mouth Opening: Small, but > 3cm Tongue: Normal  General Airway Assessment: Adult, Possible difficult intubation, Possible difficult mask airway  Mallampati: III  Improves to III with phonation.  TM Distance: Normal, at least 6 cm  Jaw/Neck Findings:  Neck ROM: Normal ROM      Dental:  Dental Findings: In tact   Chest/Lungs:  Chest/Lungs Findings: Clear to auscultation, Normal Respiratory Rate     Heart/Vascular:  Heart Findings: Rate: Normal  Rhythm: Regular Rhythm  Sounds: Normal     Abdomen:  Abdomen Findings:  Normal, Soft, Nontender, Distention, Ascites     Musculoskeletal:  Musculoskeletal Findings: Normal   Skin:  Skin Findings: Normal    Mental Status:  Mental Status Findings:  Cooperative, Alert and Oriented         Anesthesia Plan  Type of Anesthesia, risks & benefits discussed:  Anesthesia Type:  general  Patient's Preference:   Intra-op Monitoring Plan: standard ASA monitors  Intra-op Monitoring Plan Comments:   Post Op Pain Control Plan: multimodal analgesia and IV/PO Opioids PRN  Post Op Pain Control Plan Comments:   Induction:   IV  Beta Blocker:  Patient is not currently on a Beta-Blocker (No further documentation required).       Informed Consent: Patient understands risks and agrees with Anesthesia plan.  Questions answered. Anesthesia consent signed with patient.  ASA Score: 4     Day of Surgery Review of History & Physical:    H&P update referred to the surgeon.         Ready For Surgery From Anesthesia Perspective.

## 2017-10-19 NOTE — PLAN OF CARE
Problem: Patient Care Overview  Goal: Plan of Care Review  Outcome: Ongoing (interventions implemented as appropriate)  Pt AAOx4, VSS, afebrile.  Plan for pt go for LTX @ 1430 today.  Pt remain NPO since 0100.  Family at pt bedside.  Fall risk precautions initiated.  Pt in lowest bed position setting, lighting adjusted, pt to wear nonskid socks when ambulating, side rails up x2.  Pt remain free from falls during shift.   Pt verbalize understanding to call when needed assistance.  Call light within reach. Will continue to monitor.

## 2017-10-19 NOTE — PROGRESS NOTES
"[facility]  Liver Transplant  Progress Note    Patient Name: Jhonny Diana  MRN: [id number]  Admission Date: 9/27/2017  Hospital Length of Stay: 22 days  Code Status: Full Code  Primary Care Provider: Provider Notinsystem    Subjective:     History of Present Illness:  Jhonny Diana is a 26 yo male with hx of childhood asthma and heavy alcohol abuse (fifth of liquor daily since teenager, last drink 2.5 wks ago per mother) who was transferred to [facility] for higher level of care/liver transplant evaluation. Patient initially presented to OSH for a 2 mo hx of progressively worsening abdominal pain with associated fatigue, jaundice, and abdominal distention with ~20# weight gain. HPI given by patient's mother and OSH records as patient's participation in interview limited by previous administration of pain medication at OSH. Per mother, patient did not have a fever, chills, dark stools, known sick contacts, nausea, vomiting, diarrhea, confusion, or HA. Reports family hx of alcoholic cirrhosis in father. Pt was recently diagnosed with alcoholic hepatitis during his admission at OSH on 9/6-9/15. During that hospitalization, patient was given ceftriaxone for SBP ppx, prednisone for alcoholic hepatitis, and diuresed. Pt was discharged and instructed to follow up with PCP and AA within 1 wk. After his discharge, patient returned to ED 4 days after for increased weight gain and worsening abdominal pain/jaundice. At OSH, patient was given fluids (for suspected pancreatitis), diuresed and given IV albumin. Two days prior to transfer to [facility], patient had an episode of hematemesis and underwent an EGD on 9/25 that revealed "1 sm varix in distal esophagus, no stigmata of bleeding, gastropathy of portal hypertension, and reflux esophagitis".  No further episodes of hematemesis noted since initial episode.      Recent labs at OSH: Na 134, CO2 18, BUN 36, Cr 2, Tprot 5.9, albumin 1.9, Tbili 24, alk phos 83, , ALT51. " Patient also had lipase 532 and ammonia 118 during his previous hospitalization at OSH. Labwork also notable for negative mitochondrial ab, sm muscle ab, and ceruloplasmin 22. Imaging at OSH revealing abdo U/S (9/20): hepatosplenomegaly, CT abdo w contrast: hepatomegaly with marked fatty infiltration of the liver and mild ascites (9/6). No CDs available for review.      Hospital Course:  Mr. Diana was admitted to the CMICU on 9/27 for decompensated alcoholic hepatitis with high MELD score, severe HE, and LAURA. Hepatology consulted and following to evaluate liver transplant candidacy. Pt started on albumin and lasix for LAURA and possibility for HRS. However, nephrology felt that LAURA is likely multifactorial: ATN given hypotensive episodes at OSH and toxic given severe hyperbilirubinemia; not truly HRS. Lactulose/rifaximin started for HE and mental status improving slowly. Mr. Diana has had significant ABD pain since admission with ABD distension. CT abdomen was significant for pericolonic fat stranding of right colon; no obstruction. Also with diffuse mesenteric edema. No significant ascites noted, no cholecystitis/cholelithiasis/ductal dilation. US liver with doppler showed appropriate vasculature.  He was stepped down to Hospital Medicine on 10/5.  Paracentesis was done on 10/9 with 3.3L removed and 10/12 with 1.8 removed, no infection. His diet was advanced and Dietary was consulted to help get adequate caloric intake. Listed for liver transplant 10/17 with MELD 36.    Interval History: no acute events overnight. Reports sleepiness this morning from not sleeping overnight due to abd pain/cramping. Liver transplant offered and accepted today. Cr improved 2.3, holding diuretics. Na improved to 128 today, on Nacl tabs. H/H stable, last received PRBC on 10/16. WBCs remain elevated, cultures with NGTD. ID consulted for ESBL in resp culture, plan for meropenem intra-op.    Scheduled Meds:   fluconazole  200 mg Oral Daily     methylPREDNISolone sodium succinate  500 mg Intravenous Once    octreotide  100 mcg Intravenous Q8H    pantoprazole  40 mg Oral BID AC    rifAXImin  550 mg Oral BID    sodium bicarbonate  1,950 mg Per NG tube TID    sodium chloride  1 g Oral QID    sucralfate  1 g Oral Q6H     Continuous Infusions:   PRN Meds:sodium chloride, sodium chloride, ampicillin-sulbactim (UNASYN) IVPB, hepatitis B virus vacc.rec(PF), meropenem (MERREM) IVPB, mupirocin, ondansetron    Review of Systems   Constitutional: Positive for appetite change. Negative for activity change, chills, fatigue and fever.   HENT: Negative for sore throat and trouble swallowing.    Eyes: Negative for photophobia and visual disturbance.   Respiratory: Negative for cough, shortness of breath and wheezing.    Cardiovascular: Negative for chest pain, palpitations and leg swelling.   Gastrointestinal: Positive for abdominal distention and abdominal pain. Negative for constipation, diarrhea, nausea and vomiting.   Endocrine: Negative for cold intolerance and heat intolerance.   Genitourinary: Positive for scrotal swelling. Negative for dysuria and frequency.   Musculoskeletal: Negative for arthralgias and myalgias.   Skin: Positive for color change (jaundice). Negative for rash and wound.   Neurological: Negative for dizziness, syncope, weakness and light-headedness.   Psychiatric/Behavioral: Positive for sleep disturbance. Negative for confusion and hallucinations.   All other systems reviewed and are negative.    Objective:     Vital Signs (Most Recent):  Temp: 98.4 °F (36.9 °C) (10/19/17 1154)  Pulse: 97 (10/19/17 1154)  Resp: 18 (10/19/17 1154)  BP: (!) 115/55 (10/19/17 1154)  SpO2: 96 % (10/19/17 1154) Vital Signs (24h Range):  Temp:  [98.1 °F (36.7 °C)-98.8 °F (37.1 °C)] 98.4 °F (36.9 °C)  Pulse:  [93-97] 97  Resp:  [18] 18  SpO2:  [94 %-99 %] 96 %  BP: (107-116)/(53-58) 115/55     Weight: 96.6 kg (212 lb 15.4 oz)  Body mass index is 33.35  kg/m².    Intake/Output - Last 3 Shifts       10/17 0700 - 10/18 0659 10/18 0700 - 10/19 0659 10/19 0700 - 10/20 0659    P.O. 900 540 0    I.V. (mL/kg)  10 (0.1)     Other  0     Total Intake(mL/kg) 900 (9.3) 550 (5.7) 0 (0)    Urine (mL/kg/hr) 620 (0.3) 990 (0.4)     Emesis/NG output 0 (0) 0 (0)     Other 0 (0) 0 (0)     Stool 0 (0) 0 (0)     Blood 0 (0) 0 (0)     Total Output 620 990      Net +280 -440 0           Urine Occurrence 0 x 0 x     Stool Occurrence 0 x 3 x     Emesis Occurrence 0 x 0 x           Physical Exam   Constitutional: He is oriented to person, place, and time. He appears well-developed and well-nourished. He is cooperative.  Non-toxic appearance. No distress.   HENT:   Head: Normocephalic and atraumatic.   Mouth/Throat: Oropharynx is clear and moist.   Eyes: EOM are normal. Pupils are equal, round, and reactive to light. Scleral icterus is present.   Neck: Normal range of motion. Neck supple.   Cardiovascular: Regular rhythm, normal heart sounds, intact distal pulses and normal pulses.    No murmur heard.  Pulmonary/Chest: Effort normal. No respiratory distress. He has decreased breath sounds in the right lower field and the left lower field. He has no wheezes. He has no rales.   Abdominal: Soft. Normal appearance and bowel sounds are normal. He exhibits distension and ascites. He exhibits no mass. There is no tenderness. There is no rebound and no guarding. No hernia.   Musculoskeletal: Normal range of motion. He exhibits edema (+2/+3 generalized).   Lymphadenopathy:     He has no cervical adenopathy.   Neurological: He is alert and oriented to person, place, and time. He is not disoriented.   Skin: Skin is warm and dry. He is not diaphoretic.   Old blister/ wart on right plantar surface of the foot near the big toe  Jaundice   Psychiatric: He has a normal mood and affect. His behavior is normal. His affect is not labile. His speech is delayed. Cognition and memory are not impaired.   Nursing  note and vitals reviewed.      Laboratory:  Immunosuppressants     None        CBC:     Recent Labs  Lab 10/19/17  0519   WBC 16.09*   RBC 2.18*   HGB 7.9*   HCT 22.0*      *   MCH 36.2*   MCHC 35.9     CMP:     Recent Labs  Lab 10/19/17  0519   *  120*   CALCIUM 8.7  8.7   ALBUMIN 2.8*  2.8*   PROT 5.6*   *  128*   K 3.4*  3.4*   CO2 16*  16*   CL 97  97   BUN 82*  82*   CREATININE 2.3*  2.3*   ALKPHOS 130   ALT 77*   *   BILITOT 31.0*     Coagulation:     Recent Labs  Lab 10/18/17  1246 10/19/17  0519   INR 1.7* 1.8*   APTT 38.6*  --      Labs within the past 24 hours have been reviewed.    Diagnostic Results:  I have personally reviewed all pertinent imaging studies.    Assessment/Plan:     * Alcoholic hepatitis with ascites    - listed for liver transplant 10/17 with MELD 35. MELD 36 today. Liver txp offered.     MELD-Na score: 36 at 10/19/2017  5:19 AM  MELD score: 34 at 10/19/2017  5:19 AM  Calculated from:  Serum Creatinine: 2.3 mg/dL at 10/19/2017  5:19 AM  Serum Sodium: 128 mmol/L at 10/19/2017  5:19 AM  Total Bilirubin: 31.0 mg/dL at 10/19/2017  5:19 AM  INR(ratio): 1.8 at 10/19/2017  5:19 AM  Age: 27 years        Acute blood loss anemia    - H/h decreased but stable. Last transfused PRBC 10/16.   - no obvious signs of bleeding. Monitor daily.         LAURA (acute kidney injury)    - nephrology following, suspect LAURA multifactorial: ischemic ATN given hypotension and injury due to hyperbilirubinemia. Felt less likely to be HRS  - Cr improved today, good uop. Holding diuretics. Will likely need intra-op HD for transplant surgery.         Ascites due to alcoholic hepatitis    - Paracentesis 10/9 with 3.3L off and 10/12 with 1.8L off. Fluid non-infectious.  - holding diuretics 2/2 hyponatremia and LAURA.  - if not transplanted, will consider paracentesis by the end of the week.        Severe malnutrition    - Dietary consulted and following. Currently on high  protein/high calorie diet with fluid restriction.   - Calorie counts in progress and boost supplements.         Coagulopathy    - 2/2 liver disease. Monitor daily.         Leukocytosis    - WBC peaked 10/6 at 31K. Remains elevated around 16K.   - blood and urine culture 10/13 NGTD. Paracentesis with non-infected fluid.         Metabolic acidosis with normal anion gap and bicarbonate losses    - likely 2/2 diarrhea given lactulose.   - continue Nabicarb supplement. Monitor.         Hypokalemia    -improved with replacement. Monitor.         Anasarca    - 2/2 liver disease, LAURA, and hypoalbuminemia        Hyponatremia    - Na improved holding diuretics and IVF.   - Nacl tabs started 10/16. Na 128 today.        History of hematemesis    - Pt had an episode of hematemesis at OSH and EGD that revealed 1 small varix, reflux esophagitis, and gastropathy of portal hypertension  - No evidence of bleeding currently. H/H decreased but stable.   - Continue Pantoprazole BID        Hepatic encephalopathy    - aaox4. Continue lactulose and rifaxmin. Monitor.         Alcoholism /alcohol abuse    - reported about a fifth of whiskey daily since teenager.  - psych consulted, high risk for transplant. To complete ABU program s/p transplant  - [facility] 174 on admit 9/27. Last drink approx 2.5 weeks prior to admission.             VTE Risk Mitigation         Ordered     IP VTE HIGH RISK PATIENT  Once      10/18/17 1232     Send SCD stockings and ISMA hose with patient to OR  Continuous      10/18/17 1232     Place sequential compression device  Until discontinued      09/28/17 7828          The patients clinical status was discussed at multidisplinary rounds, involving transplant surgery, transplant medicine, pharmacy, nursing, nutrition, and social work    Discharge Planning:  Not candidate at this time  No Patient Care Coordination Note on file.      Seda Camacho NP  Liver Transplant  [facility]

## 2017-10-19 NOTE — PROGRESS NOTES
Arrived in OR with equipment. Intraoperative CRRT initiated without difficulty. Arterial line to femoral catheter and venous return to RIJ trialysis. Alarms set and all lines secured. UF rate set to 200ml/hr and 3K 2.5CA 130Na dialysate bath per anesthesia. Will continue to monitor.

## 2017-10-19 NOTE — TELEPHONE ENCOUNTER
PATIENT NAME: Jhonny Diana  [facility] #: [id number]    Lab Results   Component Value Date    CREATININE 2.3 (H) 10/19/2017    CREATININE 2.3 (H) 10/19/2017     (L) 10/19/2017     (L) 10/19/2017    BILITOT 31.0 (H) 10/19/2017    ALBUMIN 2.8 (L) 10/19/2017    ALBUMIN 2.8 (L) 10/19/2017    INR 1.8 (H) 10/19/2017   MELD 36  Encephalopathy: 1 - 2  Ascites: slight  Dialysis: no     Re certification form sent to  10/19/2017 at 10:28 AM.    Recertification requestor: Mago Poe

## 2017-10-19 NOTE — TELEPHONE ENCOUNTER
On Call Note: 10/18/17 10:40 PM ORGAN OFFER NOTE    Notified by Gaurav Perez, , of another liver offer for patient with donor information.  Donor and recipient information read back and verified.  Spoke with Jhonny Diana and identified no acute medical issues during telephone assessment.  Patient instructed NPO after midnight and to stop beta blockers. [facility].

## 2017-10-19 NOTE — ASSESSMENT & PLAN NOTE
- listed for liver transplant 10/17 with MELD 35. MELD 36 today. Liver txp offered.     MELD-Na score: 36 at 10/19/2017  5:19 AM  MELD score: 34 at 10/19/2017  5:19 AM  Calculated from:  Serum Creatinine: 2.3 mg/dL at 10/19/2017  5:19 AM  Serum Sodium: 128 mmol/L at 10/19/2017  5:19 AM  Total Bilirubin: 31.0 mg/dL at 10/19/2017  5:19 AM  INR(ratio): 1.8 at 10/19/2017  5:19 AM  Age: 27 years

## 2017-10-19 NOTE — PROGRESS NOTES
Initial donor liver deemed not suitable for transplant. Pt still scheduled for transplant soon but OR time is uncertain. New donor characteristics: Blood type O, Standard donor, CMV+, all other serologies negative. Will update OR  when I receive a new time.

## 2017-10-19 NOTE — ASSESSMENT & PLAN NOTE
- reported about a fifth of whiskey daily since teenager.  - psych consulted, high risk for transplant. To complete ABU program s/p transplant  - [facility] 174 on admit 9/27. Last drink approx 2.5 weeks prior to admission.

## 2017-10-19 NOTE — TELEPHONE ENCOUNTER
10/18/17 9:48 PM On Call Note: notified by Lina (procurement) that Liver Transplant to be cancelled due to donor liver not transplantable.

## 2017-10-19 NOTE — PT/OT/SLP PROGRESS
"Occupational Therapy  Treatment    Jhonny Diana   MRN: [id number]   Admitting Diagnosis: Alcoholic hepatitis with ascites    OT Date of Treatment: 10/19/17   OT Start Time: 0911  OT Stop Time: 0949  OT Total Time (min): 38 min    Billable Minutes:  Self Care/Home Management 38    General Precautions: Standard, fall, contact  Orthopedic Precautions:    Braces:           Subjective:  Communicated with RN prior to session.  "I need to get these off (compression hose). They are cutting off circulation."  - pt (RN stated she ordered a new larger pair for pt)  "He is a lot more wobbly today." - mother     Pain/Comfort  Pain Rating 1: 7/10  Location - Side 1: Bilateral  Location - Orientation 1: medial  Location 1: abdomen  Pain Addressed 1: Pre-medicate for activity, Reposition, Distraction, Nurse notified  Pain Rating Post-Intervention 1: 7/10    Objective:  Patient found with: telemetry     Functional Mobility:  Bed Mobility:NA    Transfers:   Sit>Stand: NA   Stand>Sit: CGA to EOB with RW   Toilet: CGA from commode    Pt performed functional mobility ~18ft with CGA and RW. Pt occasionally unsteady and required 1 standing rest break.      Activities of Daily Living:  UE Dressing: Minimal Assistance don gown like robe seated on commmode.  LE Dressing: Moderate Assistance with Min A to doff, Mod A to don; pt required increased time and effort and slight A for LE positioning and placement during task ( in figure 4 position) Seated at EOB  Grooming: Stand-by Assistance to brush teeth, wash face while standing at sink. Pt requried increased time and frequent standing rest breaks to complete.  Toileting: pt unable to utilize commode during session (found on commode)       Balance:  Static Sitting:           good  Dynamic Sitting:      good  Static Standing:       fair+  Dynamic Standing:  fair+    Therapeutic Activities and Exercises:  Pt and family educated on importance of continued ax and therapy post sx. Pt and family also " "educated on rolling technique post sx to reduce strain on abdomen. Pt and family verbalized understanding.  Pt educated on safety with daily tasks OOB, and importance of participating in daily ax. Pt whiteboard updated.      AM-PAC 6 CLICK ADL   How much help from another person does this patient currently need?   1 = Unable, Total/Dependent Assistance  2 = A lot, Maximum/Moderate Assistance  3 = A little, Minimum/Contact Guard/Supervision  4 = None, Modified [place]/Independent    Putting on and taking off regular lower body clothing? : 3  Bathing (including washing, rinsing, drying)?: 2  Toileting, which includes using toilet, bedpan, or urinal? : 3  Putting on and taking off regular upper body clothing?: 3  Taking care of personal grooming such as brushing teeth?: 3  Eating meals?: 4  Total Score: 18     AM-PAC Raw Score CMS "G-Code Modifier Level of Impairment Assistance   6 % Total / Unable   7 - 8 CM 80 - 100% Maximal Assist   9-13 CL 60 - 80% Moderate Assist   14 - 19 CK 40 - 60% Moderate Assist   20 - 22 CJ 20 - 40% Minimal Assist   23 CI 1-20% SBA / CGA   24 CH 0% Independent/ Mod I       Patient left seated EOB with all lines intact, call button in reach, RN notified and PT present    ASSESSMENT:  Jhonny Diana is a 27 y.o. male with a medical diagnosis of Alcoholic hepatitis with ascites. Pt tolerated session well and put forth good effort to participate. Pt required increased time, effort and rest breaks this date as well as demonstrated slightly reduced stability with mobility and dynamic functional ax. Pt presented with decreased (I), endurance, stability and safety for ADLs, self-care and functional mobility. Pt will benefit from further OT in order to maximize (I) and safety for functional tasks.      Rehab identified problem list/impairments: Rehab identified problem list/impairments: weakness, impaired functional mobilty, gait instability, impaired endurance, impaired balance, impaired " self care skills, edema, pain    Rehab potential is good.    Activity tolerance: Good    Discharge recommendations: Discharge Facility/Level Of Care Needs: home health PT     Barriers to discharge: Barriers to Discharge: None    Equipment recommendations: none     GOALS:    Occupational Therapy Goals        Problem: Occupational Therapy Goal    Goal Priority Disciplines Outcome Interventions   Occupational Therapy Goal     OT, PT/OT Ongoing (interventions implemented as appropriate)    Description:  Goals to be met by:  2 Weeks (2017)    Patient will increase functional independence with ADLs by performin. Supine to sit with [place].  2. Sit to Stand transfers with Supervision.   3. Toilet transfer to toilet with [place].  4. Grooming while standing at sink with [place].  5. UE Dressing with [place].  6. LE Dressing with [place].                                Plan:  Patient to be seen 4 x/week to address the above listed problems via self-care/home management, community/work re-entry, therapeutic activities, therapeutic exercises  Plan of Care expires: 17  Plan of Care reviewed with: patient, mother         JULIETTE Cooper  10/19/2017

## 2017-10-19 NOTE — PLAN OF CARE
Problem: Occupational Therapy Goal  Goal: Occupational Therapy Goal  Goals to be met by:  2 Weeks (2017)    Patient will increase functional independence with ADLs by performin. Supine to sit with [place].  2. Sit to Stand transfers with Supervision.   3. Toilet transfer to toilet with [place].  4. Grooming while standing at sink with [place].  5. UE Dressing with [place].  6. LE Dressing with [place].              Goals updated.  JULIETTE Cooper  10/19/2017

## 2017-10-19 NOTE — PLAN OF CARE
Problem: Patient Care Overview  Goal: Plan of Care Review  Outcome: Ongoing (interventions implemented as appropriate)  Patient aaox4. Bed kept locked, lowest position with 2x side rails up while in bed. nonslip footwear when out of bed. Ambulates with standby assistance and use of walker. Call bell and personal items within reach.  Bedside commode in place. Family at bedside, attentive to patient's needs.  Worked with PT and OT this morning. NPO for liver transplant this afternoon. Telemetry monitoring SR. Patient jaundice. Abdomen distended. +3/4 bilateral lower extremity edema. Vinod hose and SCDs in place. Right FA midline cdi. Contact precautions maintained.  Patient off floor for liver transplant; all consents in chart., mom went with patient.

## 2017-10-19 NOTE — SUBJECTIVE & OBJECTIVE
Interval History: Was schedule for transplant last night which was canceled, rescheduled for today!  Patient in good spirits, anxious regarding transplant.  Mother reports good UOP last night compared to previously.  Creatine down to 2.3  UOP  990 cc per 24hr.         Review of patient's allergies indicates:   Allergen Reactions    Bactrim [sulfamethoxazole-trimethoprim] Other (See Comments)     Mookie Trell Syndrome     Current Facility-Administered Medications   Medication Frequency    0.9%  NaCl infusion (CRRT USE ONLY) PRN    0.9%  NaCl infusion (for blood administration) Q24H PRN    0.9%  NaCl infusion (for blood administration) Q24H PRN    ampicillin-sulbactam 3 g in sodium chloride 0.9 % 100 mL IVPB (ready to mix system) On Call Procedure    fluconazole tablet 200 mg Daily    hepatitis B virus vacc.rec(PF) injection 40 mcg vaccine x 1 dose    magnesium sulfate 2g in water 50mL IVPB (premix) PRN    meropenem-0.9% sodium chloride 1 g/50 mL IVPB On Call Procedure    methylPREDNISolone sodium succinate injection 500 mg Once    mupirocin 2 % ointment On Call Procedure    octreotide injection 100 mcg Q8H    ondansetron injection 4 mg Q4H PRN    pantoprazole EC tablet 40 mg BID AC    rifAXIMin tablet 550 mg BID    sodium bicarbonate tablet 1,950 mg TID    sodium chloride tablet 1 g QID    sucralfate 100 mg/mL suspension 1 g Q6H       Objective:     Vital Signs (Most Recent):  Temp: 98.4 °F (36.9 °C) (10/19/17 0800)  Pulse: 96 (10/19/17 0800)  Resp: 18 (10/19/17 0800)  BP: (!) 112/56 (10/19/17 0800)  SpO2: (!) 94 % (10/19/17 0800)  O2 Device (Oxygen Therapy): room air (10/19/17 0800) Vital Signs (24h Range):  Temp:  [98.1 °F (36.7 °C)-98.9 °F (37.2 °C)] 98.4 °F (36.9 °C)  Pulse:  [93-97] 96  Resp:  [16-18] 18  SpO2:  [94 %-99 %] 94 %  BP: ()/(52-58) 112/56     Weight: 96.6 kg (212 lb 15.4 oz) (10/18/17 1341)  Body mass index is 33.35 kg/m².  Body surface area is 2.14 meters squared.    I/O  last 3 completed shifts:  In: 910 [P.O.:900; I.V.:10]  Out: 1140 [Urine:1140]    Physical Exam   Constitutional: He is oriented to person, place, and time. He appears well-developed and well-nourished. No distress.   HENT:   Head: Normocephalic and atraumatic.   Eyes: EOM are normal. Scleral icterus is present.   Cardiovascular: Normal rate and regular rhythm.    Pulmonary/Chest: Effort normal and breath sounds normal.   Abdominal: He exhibits distension. There is no tenderness.   Musculoskeletal: He exhibits edema. He exhibits no deformity.   Neurological: He is alert and oriented to person, place, and time.   Skin: Skin is warm and dry. He is not diaphoretic.   Psychiatric: He has a normal mood and affect. His behavior is normal.       Significant Labs:  All labs within the past 24 hours have been reviewed.   Creatine bump to 2.3 from 2.5, BUN stable at 82   Sodium improved to 128     Significant Imaging:  Labs: Reviewed  X-Ray: Reviewed

## 2017-10-19 NOTE — PROGRESS NOTES
[facility]  Nephrology  Progress Note    Patient Name: Jhonny Diana  MRN: [id number]  Admission Date: 9/27/2017  Hospital Length of Stay: 22 days  Attending Provider: Solis Chapa MD   Primary Care Physician: Provider Notinsystem  Principal Problem:Alcoholic hepatitis with ascites    Subjective:     HPI: Jhonny Diana is a 26 yo male with pMHX of childhood asthma and alcohol abuse who was was transferred to [facility] for hepatology evaluation for worsening liver functions.  Information was obtained from family at bedside and review of outside records, given that patient is obtunded.  According to mother at bedside, patient has had multiple admissions to the hospital over the past 6 months for recurrent abdominal pain.  She reports that he has been been seen the ED and has been diagnosed with diverticulitis, UTIs, gastroenteritis, and cholecystitis.  He was never admitted to the hospital and was always discharged.  On 09/06, patient presented to OSH for worsening abdominal pain and distension.  He was diagnosed at that time with ETOH hepatitis.  He was treated with steroids, ceftriaxone for SBP prophylaxis, and diuresed at that time for volume management.  He was discharged home on the 15th, after clearance from GI,a nd was instructed to follow-up with his PCP 1 week after discharge.  He presented to the hospital on 09/25 for worsening abdominal pain and distension with 20 kg weight gain.  He was transferred to [facility] for further care and Nephrology was consulted for LAURA.    On review of outside records, patient Scr on admission was 1.5 (with baseline at 1.0).  According to the family at the bedside, he was not urinating well at the OSH, and not responding to diuretics (spirionolactone/lasix combo).  His mother reports that patient continued to c/o abdominal and inability to urinate, but no chicas was placed until transfer to [facility] yesterday.  Record reviews shows relative hypotension with BPs in the low 90's.   His mother reports that the nursing staff was administrating his narcotic regimen based on his blood pressures, and at times, medication was held due to hypotension.  Since admission to hospital, his UOP has improved, responding well in Lasix 40 IV (770 ml since admission, 700 ml so far this shift at time of consultation).  Admit labs revealed a SCr of 2.6, this morning improved down to 2.4.  Bilirubin remains elevated, >14.  Abdomen remains with tight distension, unable to perform paracentesis, as no significant fluid pocket found.  Mother reports patient did not have para's done at OSH.    Labs on the morning of 09/27 with bicarb of 15.  ABG with alkalemia and primary respiratory alkalosis and anion gap acidosis.     Interval History: Was schedule for transplant last night which was canceled, rescheduled for today!  Patient in good spirits, anxious regarding transplant.  Mother reports good UOP last night compared to previously.  Creatine down to 2.3  UOP  990 cc per 24hr.         Review of patient's allergies indicates:   Allergen Reactions    Bactrim [sulfamethoxazole-trimethoprim] Other (See Comments)     Mookie Trell Syndrome     Current Facility-Administered Medications   Medication Frequency    0.9%  NaCl infusion (CRRT USE ONLY) PRN    0.9%  NaCl infusion (for blood administration) Q24H PRN    0.9%  NaCl infusion (for blood administration) Q24H PRN    ampicillin-sulbactam 3 g in sodium chloride 0.9 % 100 mL IVPB (ready to mix system) On Call Procedure    fluconazole tablet 200 mg Daily    hepatitis B virus vacc.rec(PF) injection 40 mcg vaccine x 1 dose    magnesium sulfate 2g in water 50mL IVPB (premix) PRN    meropenem-0.9% sodium chloride 1 g/50 mL IVPB On Call Procedure    methylPREDNISolone sodium succinate injection 500 mg Once    mupirocin 2 % ointment On Call Procedure    octreotide injection 100 mcg Q8H    ondansetron injection 4 mg Q4H PRN    pantoprazole EC tablet 40 mg BID AC     rifAXIMin tablet 550 mg BID    sodium bicarbonate tablet 1,950 mg TID    sodium chloride tablet 1 g QID    sucralfate 100 mg/mL suspension 1 g Q6H       Objective:     Vital Signs (Most Recent):  Temp: 98.4 °F (36.9 °C) (10/19/17 0800)  Pulse: 96 (10/19/17 0800)  Resp: 18 (10/19/17 0800)  BP: (!) 112/56 (10/19/17 0800)  SpO2: (!) 94 % (10/19/17 0800)  O2 Device (Oxygen Therapy): room air (10/19/17 0800) Vital Signs (24h Range):  Temp:  [98.1 °F (36.7 °C)-98.9 °F (37.2 °C)] 98.4 °F (36.9 °C)  Pulse:  [93-97] 96  Resp:  [16-18] 18  SpO2:  [94 %-99 %] 94 %  BP: ()/(52-58) 112/56     Weight: 96.6 kg (212 lb 15.4 oz) (10/18/17 1341)  Body mass index is 33.35 kg/m².  Body surface area is 2.14 meters squared.    I/O last 3 completed shifts:  In: 910 [P.O.:900; I.V.:10]  Out: 1140 [Urine:1140]    Physical Exam   Constitutional: He is oriented to person, place, and time. He appears well-developed and well-nourished. No distress.   HENT:   Head: Normocephalic and atraumatic.   Eyes: EOM are normal. Scleral icterus is present.   Cardiovascular: Normal rate and regular rhythm.    Pulmonary/Chest: Effort normal and breath sounds normal.   Abdominal: He exhibits distension. There is no tenderness.   Musculoskeletal: He exhibits edema. He exhibits no deformity.   Neurological: He is alert and oriented to person, place, and time.   Skin: Skin is warm and dry. He is not diaphoretic.   Psychiatric: He has a normal mood and affect. His behavior is normal.       Significant Labs:  All labs within the past 24 hours have been reviewed.   Creatine bump to 2.3 from 2.5, BUN stable at 82   Sodium improved to 128     Significant Imaging:  Labs: Reviewed  X-Ray: Reviewed    Assessment/Plan:     LAURA (acute kidney injury)    UOP stable with drop in creatine to 2.3  - likely ischemic ATN from decreased renal perfusion (volume shifts from paracentesis, anemia)  - urine sediment with muddy brown casts  - associated with non-anion gap  metabolic acidosis and hyponatremia  - agree with holding diuretics  - Continue with NaBicarb supplementation to correct acidosis  - Continue octreotide for now   - consider midodrine if BP does not improve   - Scheduled for Transplant today, Intraop dialysis orders placed by fellow.    - Will continue to follow         Hyponatremia    - previously studies c/w increased ADH secretion in setting of cirrhosis; surprisingly serum osm was wnl. Serum osm still pending  - Na improved today 128  - Consider stopping or decreasing salt tabs given worsening ascites  - will continue to monitor          Will discuss case with staff, attestation to follow.  Thank you for your consult. I will follow-up with patient. Please contact us if you have any additional questions.    Anoop Nina MD  Nephrology  [facility]    ATTENDING PHYSICIAN ATTESTATION  I have personally interviewed and examined the patient. I thoroughly reviewed the demographic, clinical, laboratorial and imaging information available in medical records. I agree with the assessment and recommendations provided by the subspecialty resident. Dr. Nina was under my supervision.

## 2017-10-19 NOTE — ASSESSMENT & PLAN NOTE
- previously studies c/w increased ADH secretion in setting of cirrhosis; surprisingly serum osm was wnl. Serum osm still pending  - Na improved today 128  - Consider stopping or decreasing salt tabs given worsening ascites  - will continue to monitor

## 2017-10-19 NOTE — PLAN OF CARE
Problem: Physical Therapy Goal  Goal: Physical Therapy Goal  Goals to be met by: 2017    Patient will increase functional independence with mobility by performin. Supine to sit with Mod I.   2. Sit to supine with mod I.  3. Sit to stand transfer with SBA using LRAD or no Ad. (met 10/19/17 using RW)  4. Bed to chair transfer with SBA using LRAD  5. Gait x150 feet with SBA using LRAD or no Ad.   6. Ascend/descend 4 stair with bilateral Handrails with CGA.  7. Lower extremity exercise program x20 reps per handout, with independence            Outcome: Ongoing (interventions implemented as appropriate)  1 goal met today

## 2017-10-19 NOTE — PT/OT/SLP PROGRESS
Physical Therapy  Treatment    Jhonny Diana   MRN: [id number]   Admitting Diagnosis: Alcoholic hepatitis with ascites    PT Received On: 10/19/17  PT Start Time: 0935     PT Stop Time: 1015    PT Total Time (min): 40 min       Billable Minutes:  Gait Training 17 and Therapeutic Activity 23    Treatment Type: Treatment  PT/PTA: PT     PTA Visit Number: 0       General Precautions: Standard, contact, fall  Orthopedic Precautions: N/A   Braces: N/A    Do you have any cultural, spiritual, Latter-day conflicts, given your current situation?: none reported     Subjective:  Communicated with pt and nurse prior to session.  Pt agreeable to PT     Pain/Comfort  Pain Rating 1: 7/10  Location 1: abdomen  Pain Addressed 1: Pre-medicate for activity, Reposition, Distraction  Pain Rating Post-Intervention 1: 7/10    Objective:   Patient found with: peripheral IV, telemetry   Pt found sitting EOB with mother and OT present    Functional Mobility:  Bed Mobility:    NP due to pt sitting on EOB upon arrival and left sitting EOB at end of session    Transfers:  Sit <> Stand Assistance: Stand By Assistance  Sit <> Stand Assistive Device: Rolling Walker   Performed from bed    Gait:   Gait Distance: 74 feet x2 with standing break between trials  Assistance 1: Stand by Assistance  Gait Assistive Device: Rolling walker  Gait Pattern: swing-through gait  Gait Deviation(s): decreased s[place]ia, increased time in double stance, decreased stride length     Balance:   Static Sit: GOOD: Takes MODERATE challenges from all directions  Dynamic Sit: GOOD: Maintains balance through MODERATE excursions of active trunk movement  Static Stand: FAIR+: Takes MINIMAL challenges from all directions  Dynamic stand: FAIR+: Needs CLOSE SUPERVISION during gait and is able to right self with minor LOB     Sitting Balance  Duration- approx 10-15 min sitting EOB  Assistance- supervision/independence  Demonstrated- good stability during reaching tasks and donning upper  body clothing  Instruction provided- feet on floor for stability and letting air out of mattress for more stable surface    Standing Balance  Duration- approx 8 minutes  Assistance- supervision with UE support  Demonstrated- good stability with static standing and weight shifting  Instruction provided- MADHU and effects of LE edema on balance    Therapeutic Activities and Exercises:  PT provided pt/mother education on:   -importance of OOB activity   -LE exercises to perform independently     AM-PAC 6 CLICK MOBILITY  How much help from another person does this patient currently need?   1 = Unable, Total/Dependent Assistance  2 = A lot, Maximum/Moderate Assistance  3 = A little, Minimum/Contact Guard/Supervision  4 = None, Modified [place]/Independent    Turning over in bed (including adjusting bedclothes, sheets and blankets)?: 4  Sitting down on and standing up from a chair with arms (e.g., wheelchair, bedside commode, etc.): 3  Moving from lying on back to sitting on the side of the bed?: 3  Moving to and from a bed to a chair (including a wheelchair)?: 3  Need to walk in hospital room?: 3  Climbing 3-5 steps with a railing?: 3  Total Score: 19    AM-PAC Raw Score CMS G-Code Modifier Level of Impairment Assistance   6 % Total / Unable   7 - 9 CM 80 - 100% Maximal Assist   10 - 14 CL 60 - 80% Moderate Assist   15 - 19 CK 40 - 60% Moderate Assist   20 - 22 CJ 20 - 40% Minimal Assist   23 CI 1-20% SBA / CGA   24 CH 0% Independent/ Mod I     Patient left sitting EOB  with mother present.    Assessment:  Jhonny Diana is a 27 y.o. male with a medical diagnosis of Alcoholic hepatitis with ascites and presents with generalized weakness and impaired balance, gait, and endurance with SOB with activity and limited activity tolerance today. Pt continues to require SBA for safety during transfers and ambulation. Pt plans to have liver transplant today. PT to await new orders post surgery as indicated.    Rehab  identified problem list/impairments: Rehab identified problem list/impairments: weakness, impaired endurance, impaired cardiopulmonary response to activity, decreased lower extremity function, gait instability, impaired balance, edema    Rehab potential is good.    Activity tolerance: Fair    Discharge recommendations: Discharge Facility/Level Of Care Needs: home health PT     Barriers to discharge: Barriers to Discharge: None    Equipment recommendations: Equipment Needed After Discharge: none     GOALS:    Physical Therapy Goals        Problem: Physical Therapy Goal    Goal Priority Disciplines Outcome Goal Variances Interventions   Physical Therapy Goal     PT/OT, PT Ongoing (interventions implemented as appropriate)     Description:  Goals to be met by: 2017    Patient will increase functional independence with mobility by performin. Supine to sit with Mod I.   2. Sit to supine with mod I.  3. Sit to stand transfer with SBA using LRAD or no Ad. (met 10/19/17 using RW)  4. Bed to chair transfer with SBA using LRAD  5. Gait x150 feet with SBA using LRAD or no Ad.   6. Ascend/descend 4 stair with bilateral Handrails with CGA.  7. Lower extremity exercise program x20 reps per handout, with independence                              PLAN:    Patient to be seen 4 x/week  to address the above listed problems via gait training, therapeutic activities, therapeutic exercises, neuromuscular re-education  Plan of Care expires: 17  Plan of Care reviewed with: patient         Anne Blackwell, PT  10/19/2017

## 2017-10-20 PROBLEM — E66.09 OBESITY DUE TO EXCESS CALORIES: Status: ACTIVE | Noted: 2017-10-20

## 2017-10-20 PROBLEM — K70.11 ASCITES DUE TO ALCOHOLIC HEPATITIS: Status: RESOLVED | Noted: 2017-10-05 | Resolved: 2017-10-20

## 2017-10-20 PROBLEM — K70.11 ALCOHOLIC HEPATITIS WITH ASCITES: Status: RESOLVED | Noted: 2017-09-26 | Resolved: 2017-10-20

## 2017-10-20 PROBLEM — Z79.60 LONG-TERM USE OF IMMUNOSUPPRESSANT MEDICATION: Chronic | Status: ACTIVE | Noted: 2017-10-20

## 2017-10-20 PROBLEM — Z29.89 PROPHYLACTIC IMMUNOTHERAPY: Chronic | Status: ACTIVE | Noted: 2017-10-20

## 2017-10-20 PROBLEM — R73.9 HYPERGLYCEMIA: Status: ACTIVE | Noted: 2017-10-20

## 2017-10-20 PROBLEM — Z94.4 LIVER REPLACED BY TRANSPLANT: Chronic | Status: ACTIVE | Noted: 2017-10-20

## 2017-10-20 PROBLEM — K76.82 HEPATIC ENCEPHALOPATHY: Status: RESOLVED | Noted: 2017-09-28 | Resolved: 2017-10-20

## 2017-10-20 PROBLEM — E87.1 HYPONATREMIA: Status: RESOLVED | Noted: 2017-09-28 | Resolved: 2017-10-20

## 2017-10-20 PROBLEM — Z94.4 LIVER TRANSPLANTED: Status: ACTIVE | Noted: 2017-10-20

## 2017-10-20 PROBLEM — E87.6 HYPOKALEMIA: Status: RESOLVED | Noted: 2017-09-29 | Resolved: 2017-10-20

## 2017-10-20 PROBLEM — Z87.19 HISTORY OF HEMATEMESIS: Status: RESOLVED | Noted: 2017-09-28 | Resolved: 2017-10-20

## 2017-10-20 PROBLEM — E87.20 METABOLIC ACIDOSIS WITH NORMAL ANION GAP AND BICARBONATE LOSSES: Status: RESOLVED | Noted: 2017-10-02 | Resolved: 2017-10-20

## 2017-10-20 PROBLEM — D84.9 IMMUNOSUPPRESSION: Status: ACTIVE | Noted: 2017-10-20

## 2017-10-20 LAB
ABO + RH BLD: NORMAL
ALBUMIN SERPL BCP-MCNC: 1.9 G/DL
ALBUMIN SERPL BCP-MCNC: 2 G/DL
ALBUMIN SERPL BCP-MCNC: 2.1 G/DL
ALBUMIN SERPL BCP-MCNC: 2.1 G/DL
ALBUMIN SERPL BCP-MCNC: 2.2 G/DL
ALLENS TEST: ABNORMAL
ALP SERPL-CCNC: 62 U/L
ALP SERPL-CCNC: 65 U/L
ALT SERPL W/O P-5'-P-CCNC: 122 U/L
ALT SERPL W/O P-5'-P-CCNC: 135 U/L
AMYLASE SERPL-CCNC: 29 U/L
ANION GAP SERPL CALC-SCNC: 12 MMOL/L
ANION GAP SERPL CALC-SCNC: 13 MMOL/L
ANION GAP SERPL CALC-SCNC: 13 MMOL/L
APTT BLDCRRT: 26.4 SEC
APTT BLDCRRT: 39.6 SEC
APTT BLDCRRT: 39.6 SEC
APTT BLDCRRT: 59.4 SEC
AST SERPL-CCNC: 216 U/L
AST SERPL-CCNC: 258 U/L
AST SERPL-CCNC: 277 U/L
AST SERPL-CCNC: 335 U/L
AST SERPL-CCNC: 335 U/L
AST SERPL-CCNC: 351 U/L
BASOPHILS # BLD AUTO: 0.01 K/UL
BASOPHILS # BLD AUTO: 0.02 K/UL
BASOPHILS # BLD AUTO: 0.02 K/UL
BASOPHILS # BLD AUTO: 0.03 K/UL
BASOPHILS # BLD AUTO: 0.04 K/UL
BASOPHILS NFR BLD: 0.1 %
BASOPHILS NFR BLD: 0.2 %
BASOPHILS NFR BLD: 0.2 %
BILIRUB DIRECT SERPL-MCNC: 11.4 MG/DL
BILIRUB SERPL-MCNC: 15.7 MG/DL
BILIRUB SERPL-MCNC: 17.5 MG/DL
BLD GP AB SCN CELLS X3 SERPL QL: NORMAL
BUN SERPL-MCNC: 34 MG/DL
BUN SERPL-MCNC: 37 MG/DL
BUN SERPL-MCNC: 37 MG/DL
BUN SERPL-MCNC: 41 MG/DL
BUN SERPL-MCNC: 42 MG/DL
CA-I BLDV-SCNC: 1 MMOL/L
CA-I BLDV-SCNC: 1.03 MMOL/L
CA-I BLDV-SCNC: 1.13 MMOL/L
CALCIUM SERPL-MCNC: 8 MG/DL
CALCIUM SERPL-MCNC: 8.1 MG/DL
CALCIUM SERPL-MCNC: 8.1 MG/DL
CALCIUM SERPL-MCNC: 8.3 MG/DL
CALCIUM SERPL-MCNC: 8.3 MG/DL
CHLORIDE SERPL-SCNC: 102 MMOL/L
CHLORIDE SERPL-SCNC: 103 MMOL/L
CO2 SERPL-SCNC: 20 MMOL/L
CO2 SERPL-SCNC: 21 MMOL/L
CREAT SERPL-MCNC: 1.1 MG/DL
CREAT SERPL-MCNC: 1.2 MG/DL
CREAT SERPL-MCNC: 1.2 MG/DL
CREAT SERPL-MCNC: 1.5 MG/DL
CREAT SERPL-MCNC: 1.6 MG/DL
DELSYS: ABNORMAL
DIFFERENTIAL METHOD: ABNORMAL
EOSINOPHIL # BLD AUTO: 0 K/UL
EOSINOPHIL NFR BLD: 0 %
EOSINOPHIL NFR BLD: 0.2 %
ERYTHROCYTE [DISTWIDTH] IN BLOOD BY AUTOMATED COUNT: 21.1 %
ERYTHROCYTE [DISTWIDTH] IN BLOOD BY AUTOMATED COUNT: 21.8 %
ERYTHROCYTE [DISTWIDTH] IN BLOOD BY AUTOMATED COUNT: 22 %
ERYTHROCYTE [DISTWIDTH] IN BLOOD BY AUTOMATED COUNT: 22.2 %
ERYTHROCYTE [DISTWIDTH] IN BLOOD BY AUTOMATED COUNT: 22.3 %
ERYTHROCYTE [SEDIMENTATION RATE] IN BLOOD BY WESTERGREN METHOD: 12 MM/H
EST. GFR  (AFRICAN AMERICAN): >60 ML/MIN/1.73 M^2
EST. GFR  (NON AFRICAN AMERICAN): 57.8 ML/MIN/1.73 M^2
EST. GFR  (NON AFRICAN AMERICAN): >60 ML/MIN/1.73 M^2
FIBRINOGEN PPP-MCNC: 124 MG/DL
FIBRINOGEN PPP-MCNC: 213 MG/DL
FIO2: 100
FIO2: 40
FIO2: 40
FLOW: 60
GGT SERPL-CCNC: 64 U/L
GLUCOSE SERPL-MCNC: 102 MG/DL (ref 70–110)
GLUCOSE SERPL-MCNC: 106 MG/DL (ref 70–110)
GLUCOSE SERPL-MCNC: 110 MG/DL
GLUCOSE SERPL-MCNC: 124 MG/DL (ref 70–110)
GLUCOSE SERPL-MCNC: 140 MG/DL (ref 70–110)
GLUCOSE SERPL-MCNC: 142 MG/DL (ref 70–110)
GLUCOSE SERPL-MCNC: 145 MG/DL (ref 70–110)
GLUCOSE SERPL-MCNC: 148 MG/DL
GLUCOSE SERPL-MCNC: 151 MG/DL
GLUCOSE SERPL-MCNC: 172 MG/DL
GLUCOSE SERPL-MCNC: 186 MG/DL
GLUCOSE SERPL-MCNC: 217 MG/DL
GLUCOSE SERPL-MCNC: 217 MG/DL
GLUCOSE SERPL-MCNC: 95 MG/DL (ref 70–110)
HCO3 UR-SCNC: 20.8 MMOL/L (ref 24–28)
HCO3 UR-SCNC: 20.9 MMOL/L (ref 24–28)
HCO3 UR-SCNC: 21.1 MMOL/L (ref 24–28)
HCO3 UR-SCNC: 21.2 MMOL/L (ref 24–28)
HCO3 UR-SCNC: 21.8 MMOL/L (ref 24–28)
HCO3 UR-SCNC: 22.4 MMOL/L (ref 24–28)
HCO3 UR-SCNC: 22.9 MMOL/L (ref 24–28)
HCO3 UR-SCNC: 23.4 MMOL/L (ref 24–28)
HCO3 UR-SCNC: 23.5 MMOL/L (ref 24–28)
HCO3 UR-SCNC: 23.6 MMOL/L (ref 24–28)
HCO3 UR-SCNC: 24.8 MMOL/L (ref 24–28)
HCO3 UR-SCNC: 24.8 MMOL/L (ref 24–28)
HCO3 UR-SCNC: 25.8 MMOL/L (ref 24–28)
HCO3 UR-SCNC: 26.1 MMOL/L (ref 24–28)
HCO3 UR-SCNC: 26.3 MMOL/L (ref 24–28)
HCT VFR BLD AUTO: 22.6 %
HCT VFR BLD AUTO: 24.8 %
HCT VFR BLD AUTO: 28 %
HCT VFR BLD AUTO: 29.7 %
HCT VFR BLD AUTO: 30 %
HCT VFR BLD AUTO: 30.6 %
HCT VFR BLD AUTO: 31.2 %
HCT VFR BLD CALC: 21 %PCV (ref 36–54)
HCT VFR BLD CALC: 21 %PCV (ref 36–54)
HCT VFR BLD CALC: 22 %PCV (ref 36–54)
HCT VFR BLD CALC: 22 %PCV (ref 36–54)
HCT VFR BLD CALC: 25 %PCV (ref 36–54)
HCT VFR BLD CALC: 27 %PCV (ref 36–54)
HCT VFR BLD CALC: 28 %PCV (ref 36–54)
HCT VFR BLD CALC: 28 %PCV (ref 36–54)
HCV LOG: <1.08 LOG (10) IU/ML
HCV RNA QUANT PCR: <12 IU/ML
HCV, QUALITATIVE: NOT DETECTED IU/ML
HGB BLD-MCNC: 10.6 G/DL
HGB BLD-MCNC: 10.9 G/DL
HGB BLD-MCNC: 11 G/DL
HGB BLD-MCNC: 11.3 G/DL
HGB BLD-MCNC: 8.1 G/DL
HGB BLD-MCNC: 8.9 G/DL
HGB BLD-MCNC: 9.9 G/DL
HIV UQ DATE RECEIVED: NORMAL
HIV UQ DATE REPORTED: NORMAL
HIV1 RNA # SERPL NAA+PROBE: <40 COPIES/ML
HIV1 RNA SERPL NAA+PROBE-LOG#: <1.6 LOG (10) COPIES/ML
HIV1 RNA SERPL QL NAA+PROBE: NOT DETECTED
IMM GRANULOCYTES # BLD AUTO: 0.11 K/UL
IMM GRANULOCYTES # BLD AUTO: 0.12 K/UL
IMM GRANULOCYTES # BLD AUTO: 0.14 K/UL
IMM GRANULOCYTES # BLD AUTO: 0.19 K/UL
IMM GRANULOCYTES # BLD AUTO: 0.4 K/UL
IMM GRANULOCYTES NFR BLD AUTO: 0.8 %
IMM GRANULOCYTES NFR BLD AUTO: 0.8 %
IMM GRANULOCYTES NFR BLD AUTO: 0.9 %
IMM GRANULOCYTES NFR BLD AUTO: 1.3 %
IMM GRANULOCYTES NFR BLD AUTO: 2.5 %
INR PPP: 1.2
INR PPP: 1.4
INR PPP: 1.5
INR PPP: 1.6
LACTATE SERPL-SCNC: 1.2 MMOL/L
LDH SERPL L TO P-CCNC: 703 U/L
LYMPHOCYTES # BLD AUTO: 0.8 K/UL
LYMPHOCYTES # BLD AUTO: 0.8 K/UL
LYMPHOCYTES # BLD AUTO: 0.9 K/UL
LYMPHOCYTES # BLD AUTO: 1 K/UL
LYMPHOCYTES # BLD AUTO: 1 K/UL
LYMPHOCYTES NFR BLD: 4.9 %
LYMPHOCYTES NFR BLD: 5.5 %
LYMPHOCYTES NFR BLD: 6.5 %
LYMPHOCYTES NFR BLD: 6.6 %
LYMPHOCYTES NFR BLD: 7 %
MAGNESIUM SERPL-MCNC: 1.6 MG/DL
MAGNESIUM SERPL-MCNC: 1.6 MG/DL
MCH RBC QN AUTO: 30.4 PG
MCH RBC QN AUTO: 30.5 PG
MCH RBC QN AUTO: 30.8 PG
MCH RBC QN AUTO: 30.9 PG
MCH RBC QN AUTO: 31 PG
MCHC RBC AUTO-ENTMCNC: 35.4 G/DL
MCHC RBC AUTO-ENTMCNC: 35.6 G/DL
MCHC RBC AUTO-ENTMCNC: 35.7 G/DL
MCHC RBC AUTO-ENTMCNC: 36.2 G/DL
MCHC RBC AUTO-ENTMCNC: 36.7 G/DL
MCV RBC AUTO: 84 FL
MCV RBC AUTO: 84 FL
MCV RBC AUTO: 86 FL
MCV RBC AUTO: 87 FL
MCV RBC AUTO: 88 FL
MODE: ABNORMAL
MONOCYTES # BLD AUTO: 0.5 K/UL
MONOCYTES # BLD AUTO: 0.5 K/UL
MONOCYTES # BLD AUTO: 0.6 K/UL
MONOCYTES # BLD AUTO: 1 K/UL
MONOCYTES # BLD AUTO: 1.5 K/UL
MONOCYTES NFR BLD: 10.1 %
MONOCYTES NFR BLD: 3.4 %
MONOCYTES NFR BLD: 3.7 %
MONOCYTES NFR BLD: 4.5 %
MONOCYTES NFR BLD: 5.9 %
NEUTROPHILS # BLD AUTO: 12.4 K/UL
NEUTROPHILS # BLD AUTO: 12.4 K/UL
NEUTROPHILS # BLD AUTO: 12.6 K/UL
NEUTROPHILS # BLD AUTO: 13 K/UL
NEUTROPHILS # BLD AUTO: 14.1 K/UL
NEUTROPHILS NFR BLD: 82.2 %
NEUTROPHILS NFR BLD: 86.3 %
NEUTROPHILS NFR BLD: 88.1 %
NEUTROPHILS NFR BLD: 88.4 %
NEUTROPHILS NFR BLD: 89.7 %
NRBC BLD-RTO: 0 /100 WBC
PCO2 BLDA: 30.5 MMHG (ref 35–45)
PCO2 BLDA: 30.9 MMHG (ref 35–45)
PCO2 BLDA: 32.1 MMHG (ref 35–45)
PCO2 BLDA: 32.3 MMHG (ref 35–45)
PCO2 BLDA: 33 MMHG (ref 35–45)
PCO2 BLDA: 34.5 MMHG (ref 35–45)
PCO2 BLDA: 34.7 MMHG (ref 35–45)
PCO2 BLDA: 35.7 MMHG (ref 35–45)
PCO2 BLDA: 35.7 MMHG (ref 35–45)
PCO2 BLDA: 36.5 MMHG (ref 35–45)
PCO2 BLDA: 38.6 MMHG (ref 35–45)
PCO2 BLDA: 40.5 MMHG (ref 35–45)
PCO2 BLDA: 40.7 MMHG (ref 35–45)
PCO2 BLDA: 40.7 MMHG (ref 35–45)
PCO2 BLDA: 42.6 MMHG (ref 35–45)
PEEP: 5
PH SMN: 7.37 [PH] (ref 7.35–7.45)
PH SMN: 7.39 [PH] (ref 7.35–7.45)
PH SMN: 7.39 [PH] (ref 7.35–7.45)
PH SMN: 7.4 [PH] (ref 7.35–7.45)
PH SMN: 7.41 [PH] (ref 7.35–7.45)
PH SMN: 7.42 [PH] (ref 7.35–7.45)
PH SMN: 7.43 [PH] (ref 7.35–7.45)
PH SMN: 7.44 [PH] (ref 7.35–7.45)
PH SMN: 7.45 [PH] (ref 7.35–7.45)
PH SMN: 7.46 [PH] (ref 7.35–7.45)
PH SMN: 7.49 [PH] (ref 7.35–7.45)
PHOSPHATE SERPL-MCNC: 3.2 MG/DL
PIP: 23
PIP: 24
PIP: 26
PLATELET # BLD AUTO: 121 K/UL
PLATELET # BLD AUTO: 131 K/UL
PLATELET # BLD AUTO: 142 K/UL
PLATELET # BLD AUTO: 148 K/UL
PLATELET # BLD AUTO: 150 K/UL
PLATELET # BLD AUTO: 151 K/UL
PLATELET # BLD AUTO: 92 K/UL
PLATELET BLD QL SMEAR: NORMAL
PMV BLD AUTO: 10.3 FL
PMV BLD AUTO: 10.8 FL
PMV BLD AUTO: 11 FL
PMV BLD AUTO: 11.4 FL
PMV BLD AUTO: 11.5 FL
PMV BLD AUTO: 11.5 FL
PMV BLD AUTO: 12.4 FL
PO2 BLDA: 100 MMHG (ref 80–100)
PO2 BLDA: 108 MMHG (ref 80–100)
PO2 BLDA: 203 MMHG (ref 80–100)
PO2 BLDA: 247 MMHG (ref 80–100)
PO2 BLDA: 329 MMHG (ref 80–100)
PO2 BLDA: 333 MMHG (ref 80–100)
PO2 BLDA: 345 MMHG (ref 80–100)
PO2 BLDA: 355 MMHG (ref 80–100)
PO2 BLDA: 356 MMHG (ref 80–100)
PO2 BLDA: 68 MMHG (ref 80–100)
PO2 BLDA: 71 MMHG (ref 80–100)
PO2 BLDA: 82 MMHG (ref 80–100)
PO2 BLDA: 86 MMHG (ref 80–100)
PO2 BLDA: 92 MMHG (ref 80–100)
PO2 BLDA: 93 MMHG (ref 80–100)
POC BE: -1 MMOL/L
POC BE: -1 MMOL/L
POC BE: -2 MMOL/L
POC BE: -3 MMOL/L
POC BE: -3 MMOL/L
POC BE: -4 MMOL/L
POC BE: -4 MMOL/L
POC BE: 0 MMOL/L
POC BE: 1 MMOL/L
POC BE: 1 MMOL/L
POC BE: 2 MMOL/L
POC BE: 3 MMOL/L
POC IONIZED CALCIUM: 0.95 MMOL/L (ref 1.06–1.42)
POC IONIZED CALCIUM: 1.04 MMOL/L (ref 1.06–1.42)
POC IONIZED CALCIUM: 1.05 MMOL/L (ref 1.06–1.42)
POC IONIZED CALCIUM: 1.07 MMOL/L (ref 1.06–1.42)
POC IONIZED CALCIUM: 1.14 MMOL/L (ref 1.06–1.42)
POC IONIZED CALCIUM: 1.19 MMOL/L (ref 1.06–1.42)
POC IONIZED CALCIUM: 1.2 MMOL/L (ref 1.06–1.42)
POC IONIZED CALCIUM: 1.24 MMOL/L (ref 1.06–1.42)
POC SATURATED O2: 100 % (ref 95–100)
POC SATURATED O2: 94 % (ref 95–100)
POC SATURATED O2: 94 % (ref 95–100)
POC SATURATED O2: 97 % (ref 95–100)
POC SATURATED O2: 98 % (ref 95–100)
POC TCO2: 22 MMOL/L (ref 23–27)
POC TCO2: 23 MMOL/L (ref 23–27)
POC TCO2: 23 MMOL/L (ref 23–27)
POC TCO2: 24 MMOL/L (ref 23–27)
POC TCO2: 24 MMOL/L (ref 23–27)
POC TCO2: 25 MMOL/L (ref 23–27)
POC TCO2: 25 MMOL/L (ref 23–27)
POC TCO2: 26 MMOL/L (ref 23–27)
POC TCO2: 26 MMOL/L (ref 23–27)
POC TCO2: 27 MMOL/L (ref 23–27)
POC TCO2: 27 MMOL/L (ref 23–27)
POC TCO2: 28 MMOL/L (ref 23–27)
POCT GLUCOSE: 125 MG/DL (ref 70–110)
POCT GLUCOSE: 149 MG/DL (ref 70–110)
POCT GLUCOSE: 177 MG/DL (ref 70–110)
POCT GLUCOSE: 190 MG/DL (ref 70–110)
POCT GLUCOSE: 198 MG/DL (ref 70–110)
POCT GLUCOSE: 200 MG/DL (ref 70–110)
POCT GLUCOSE: 214 MG/DL (ref 70–110)
POCT GLUCOSE: 217 MG/DL (ref 70–110)
POCT GLUCOSE: 227 MG/DL (ref 70–110)
POCT GLUCOSE: 227 MG/DL (ref 70–110)
POCT GLUCOSE: 230 MG/DL (ref 70–110)
POCT GLUCOSE: 234 MG/DL (ref 70–110)
POCT GLUCOSE: 235 MG/DL (ref 70–110)
POCT GLUCOSE: 238 MG/DL (ref 70–110)
POCT GLUCOSE: 248 MG/DL (ref 70–110)
POTASSIUM BLD-SCNC: 2.9 MMOL/L (ref 3.5–5.1)
POTASSIUM BLD-SCNC: 3.2 MMOL/L (ref 3.5–5.1)
POTASSIUM BLD-SCNC: 3.2 MMOL/L (ref 3.5–5.1)
POTASSIUM BLD-SCNC: 3.3 MMOL/L (ref 3.5–5.1)
POTASSIUM BLD-SCNC: 3.4 MMOL/L (ref 3.5–5.1)
POTASSIUM BLD-SCNC: 3.5 MMOL/L (ref 3.5–5.1)
POTASSIUM SERPL-SCNC: 2.9 MMOL/L
POTASSIUM SERPL-SCNC: 3.3 MMOL/L
POTASSIUM SERPL-SCNC: 3.4 MMOL/L
POTASSIUM SERPL-SCNC: 3.5 MMOL/L
PROT SERPL-MCNC: 3.5 G/DL
PROT SERPL-MCNC: 3.8 G/DL
PROTHROMBIN TIME: 12.1 SEC
PROTHROMBIN TIME: 12.4 SEC
PROTHROMBIN TIME: 12.7 SEC
PROTHROMBIN TIME: 14 SEC
PROTHROMBIN TIME: 14.2 SEC
PROTHROMBIN TIME: 14.2 SEC
PROTHROMBIN TIME: 15.5 SEC
PROTHROMBIN TIME: 16 SEC
PS: 10
RBC # BLD AUTO: 3.2 M/UL
RBC # BLD AUTO: 3.42 M/UL
RBC # BLD AUTO: 3.57 M/UL
RBC # BLD AUTO: 3.58 M/UL
RBC # BLD AUTO: 3.71 M/UL
SAMPLE: ABNORMAL
SITE: ABNORMAL
SODIUM BLD-SCNC: 132 MMOL/L (ref 136–145)
SODIUM BLD-SCNC: 133 MMOL/L (ref 136–145)
SODIUM BLD-SCNC: 133 MMOL/L (ref 136–145)
SODIUM BLD-SCNC: 134 MMOL/L (ref 136–145)
SODIUM BLD-SCNC: 137 MMOL/L (ref 136–145)
SODIUM SERPL-SCNC: 134 MMOL/L
SODIUM SERPL-SCNC: 135 MMOL/L
SODIUM SERPL-SCNC: 136 MMOL/L
SODIUM SERPL-SCNC: 136 MMOL/L
SP02: 100
TACROLIMUS BLD-MCNC: <1.5 NG/ML
VT: 500
WBC # BLD AUTO: 14.08 K/UL
WBC # BLD AUTO: 14.21 K/UL
WBC # BLD AUTO: 14.51 K/UL
WBC # BLD AUTO: 15.13 K/UL
WBC # BLD AUTO: 16.32 K/UL

## 2017-10-20 PROCEDURE — 20000000 HC ICU ROOM

## 2017-10-20 PROCEDURE — 83615 LACTATE (LD) (LDH) ENZYME: CPT

## 2017-10-20 PROCEDURE — 25000003 PHARM REV CODE 250: Performed by: NURSE ANESTHETIST, CERTIFIED REGISTERED

## 2017-10-20 PROCEDURE — 25000003 PHARM REV CODE 250: Performed by: INTERNAL MEDICINE

## 2017-10-20 PROCEDURE — 84295 ASSAY OF SERUM SODIUM: CPT | Mod: 91

## 2017-10-20 PROCEDURE — 25000003 PHARM REV CODE 250: Performed by: STUDENT IN AN ORGANIZED HEALTH CARE EDUCATION/TRAINING PROGRAM

## 2017-10-20 PROCEDURE — S5010 5% DEXTROSE AND 0.45% SALINE: HCPCS | Performed by: STUDENT IN AN ORGANIZED HEALTH CARE EDUCATION/TRAINING PROGRAM

## 2017-10-20 PROCEDURE — 85014 HEMATOCRIT: CPT | Mod: 91

## 2017-10-20 PROCEDURE — 80048 BASIC METABOLIC PNL TOTAL CA: CPT

## 2017-10-20 PROCEDURE — 80069 RENAL FUNCTION PANEL: CPT

## 2017-10-20 PROCEDURE — 83735 ASSAY OF MAGNESIUM: CPT | Mod: 91

## 2017-10-20 PROCEDURE — 84132 ASSAY OF SERUM POTASSIUM: CPT

## 2017-10-20 PROCEDURE — 85610 PROTHROMBIN TIME: CPT | Mod: 91

## 2017-10-20 PROCEDURE — 82955 ASSAY OF G6PD ENZYME: CPT

## 2017-10-20 PROCEDURE — 25000003 PHARM REV CODE 250: Performed by: TRANSPLANT SURGERY

## 2017-10-20 PROCEDURE — 84295 ASSAY OF SERUM SODIUM: CPT

## 2017-10-20 PROCEDURE — 82977 ASSAY OF GGT: CPT

## 2017-10-20 PROCEDURE — 82947 ASSAY GLUCOSE BLOOD QUANT: CPT

## 2017-10-20 PROCEDURE — 27201041 HC RESERVOIR, CARDIOTOMY: Mod: NTX

## 2017-10-20 PROCEDURE — 63600175 PHARM REV CODE 636 W HCPCS: Performed by: NURSE ANESTHETIST, CERTIFIED REGISTERED

## 2017-10-20 PROCEDURE — 63600175 PHARM REV CODE 636 W HCPCS

## 2017-10-20 PROCEDURE — S0028 INJECTION, FAMOTIDINE, 20 MG: HCPCS | Performed by: TRANSPLANT SURGERY

## 2017-10-20 PROCEDURE — 82150 ASSAY OF AMYLASE: CPT

## 2017-10-20 PROCEDURE — 86920 COMPATIBILITY TEST SPIN: CPT

## 2017-10-20 PROCEDURE — 27100088 HC CELL SAVER: Mod: NTX

## 2017-10-20 PROCEDURE — 36415 COLL VENOUS BLD VENIPUNCTURE: CPT

## 2017-10-20 PROCEDURE — 80053 COMPREHEN METABOLIC PANEL: CPT | Mod: 91

## 2017-10-20 PROCEDURE — 85520 HEPARIN ASSAY: CPT | Mod: NTX

## 2017-10-20 PROCEDURE — 81300000 HC LIVER ACQUISITION CHARGE

## 2017-10-20 PROCEDURE — 85049 AUTOMATED PLATELET COUNT: CPT

## 2017-10-20 PROCEDURE — 82248 BILIRUBIN DIRECT: CPT

## 2017-10-20 PROCEDURE — 82803 BLOOD GASES ANY COMBINATION: CPT

## 2017-10-20 PROCEDURE — 94002 VENT MGMT INPAT INIT DAY: CPT

## 2017-10-20 PROCEDURE — 94150 VITAL CAPACITY TEST: CPT

## 2017-10-20 PROCEDURE — 99900035 HC TECH TIME PER 15 MIN (STAT)

## 2017-10-20 PROCEDURE — 63600175 PHARM REV CODE 636 W HCPCS: Performed by: INTERNAL MEDICINE

## 2017-10-20 PROCEDURE — 85002 BLEEDING TIME TEST: CPT | Mod: NTX

## 2017-10-20 PROCEDURE — 99232 SBSQ HOSP IP/OBS MODERATE 35: CPT | Mod: ,,, | Performed by: INTERNAL MEDICINE

## 2017-10-20 PROCEDURE — 85730 THROMBOPLASTIN TIME PARTIAL: CPT | Mod: 91

## 2017-10-20 PROCEDURE — 97803 MED NUTRITION INDIV SUBSEQ: CPT | Performed by: DIETITIAN, REGISTERED

## 2017-10-20 PROCEDURE — 82040 ASSAY OF SERUM ALBUMIN: CPT

## 2017-10-20 PROCEDURE — 80197 ASSAY OF TACROLIMUS: CPT

## 2017-10-20 PROCEDURE — 85025 COMPLETE CBC W/AUTO DIFF WBC: CPT | Mod: 91

## 2017-10-20 PROCEDURE — 82306 VITAMIN D 25 HYDROXY: CPT

## 2017-10-20 PROCEDURE — 27000221 HC OXYGEN, UP TO 24 HOURS

## 2017-10-20 PROCEDURE — 83605 ASSAY OF LACTIC ACID: CPT

## 2017-10-20 PROCEDURE — 84450 TRANSFERASE (AST) (SGOT): CPT | Mod: 91

## 2017-10-20 PROCEDURE — 63600175 PHARM REV CODE 636 W HCPCS: Performed by: STUDENT IN AN ORGANIZED HEALTH CARE EDUCATION/TRAINING PROGRAM

## 2017-10-20 PROCEDURE — 82330 ASSAY OF CALCIUM: CPT

## 2017-10-20 PROCEDURE — 63600175 PHARM REV CODE 636 W HCPCS: Performed by: TRANSPLANT SURGERY

## 2017-10-20 PROCEDURE — 81300030 HC LIVER TRANSPORT, FLIGHT WITHIN 300 MILES

## 2017-10-20 PROCEDURE — 99900026 HC AIRWAY MAINTENANCE (STAT)

## 2017-10-20 PROCEDURE — 27000174 HC ADDITIONAL TIME, PER HR

## 2017-10-20 PROCEDURE — 85384 FIBRINOGEN ACTIVITY: CPT

## 2017-10-20 PROCEDURE — 0FY00Z0 TRANSPLANTATION OF LIVER, ALLOGENEIC, OPEN APPROACH: ICD-10-PCS | Performed by: TRANSPLANT SURGERY

## 2017-10-20 PROCEDURE — 85014 HEMATOCRIT: CPT

## 2017-10-20 PROCEDURE — 37799 UNLISTED PX VASCULAR SURGERY: CPT

## 2017-10-20 PROCEDURE — 82330 ASSAY OF CALCIUM: CPT | Mod: 91

## 2017-10-20 PROCEDURE — 85018 HEMOGLOBIN: CPT

## 2017-10-20 PROCEDURE — 85610 PROTHROMBIN TIME: CPT

## 2017-10-20 PROCEDURE — 86900 BLOOD TYPING SEROLOGIC ABO: CPT

## 2017-10-20 PROCEDURE — 63600175 PHARM REV CODE 636 W HCPCS: Performed by: ANESTHESIOLOGY

## 2017-10-20 PROCEDURE — 94761 N-INVAS EAR/PLS OXIMETRY MLT: CPT

## 2017-10-20 PROCEDURE — 99223 1ST HOSP IP/OBS HIGH 75: CPT | Mod: ,,, | Performed by: NURSE PRACTITIONER

## 2017-10-20 PROCEDURE — S5010 5% DEXTROSE AND 0.45% SALINE: HCPCS | Performed by: TRANSPLANT SURGERY

## 2017-10-20 PROCEDURE — 27000191 HC C-V MONITORING: Mod: NTX

## 2017-10-20 PROCEDURE — 86901 BLOOD TYPING SEROLOGIC RH(D): CPT

## 2017-10-20 RX ORDER — METHYLPREDNISOLONE SOD SUCC 125 MG
80 VIAL (EA) INJECTION EVERY 12 HOURS
Status: COMPLETED | OUTPATIENT
Start: 2017-10-22 | End: 2017-10-22

## 2017-10-20 RX ORDER — FLUCONAZOLE 200 MG/1
200 TABLET ORAL DAILY
Qty: 30 TABLET | Refills: 0 | Status: ON HOLD | OUTPATIENT
Start: 2017-10-20 | End: 2017-11-21 | Stop reason: ALTCHOICE

## 2017-10-20 RX ORDER — HEPARIN SODIUM 5000 [USP'U]/ML
5000 INJECTION, SOLUTION INTRAVENOUS; SUBCUTANEOUS EVERY 8 HOURS
Status: DISCONTINUED | OUTPATIENT
Start: 2017-10-20 | End: 2017-10-30

## 2017-10-20 RX ORDER — PROPOFOL 10 MG/ML
5 INJECTION, EMULSION INTRAVENOUS CONTINUOUS
Status: DISCONTINUED | OUTPATIENT
Start: 2017-10-20 | End: 2017-10-21

## 2017-10-20 RX ORDER — TACROLIMUS 1 MG/1
6 CAPSULE ORAL EVERY 12 HOURS
Qty: 360 CAPSULE | Refills: 11 | Status: CANCELLED | OUTPATIENT
Start: 2017-10-20

## 2017-10-20 RX ORDER — HYDROCODONE BITARTRATE AND ACETAMINOPHEN 500; 5 MG/1; MG/1
TABLET ORAL
Status: DISCONTINUED | OUTPATIENT
Start: 2017-10-20 | End: 2017-10-21

## 2017-10-20 RX ORDER — MORPHINE SULFATE 2 MG/ML
1 INJECTION, SOLUTION INTRAMUSCULAR; INTRAVENOUS
Status: DISCONTINUED | OUTPATIENT
Start: 2017-10-20 | End: 2017-10-22

## 2017-10-20 RX ORDER — DEXTROSE MONOHYDRATE AND SODIUM CHLORIDE 5; .45 G/100ML; G/100ML
INJECTION, SOLUTION INTRAVENOUS CONTINUOUS
Status: DISCONTINUED | OUTPATIENT
Start: 2017-10-20 | End: 2017-10-23

## 2017-10-20 RX ORDER — FAMOTIDINE 20 MG/1
20 TABLET, FILM COATED ORAL NIGHTLY
Qty: 30 TABLET | Refills: 5 | Status: CANCELLED | OUTPATIENT
Start: 2017-10-20

## 2017-10-20 RX ORDER — ATOVAQUONE 750 MG/5ML
1500 SUSPENSION ORAL DAILY
Status: DISCONTINUED | OUTPATIENT
Start: 2017-10-20 | End: 2017-10-20

## 2017-10-20 RX ORDER — MYCOPHENOLATE MOFETIL 250 MG/1
1000 CAPSULE ORAL 2 TIMES DAILY
Qty: 240 CAPSULE | Refills: 2 | Status: ON HOLD | OUTPATIENT
Start: 2017-10-20 | End: 2017-11-21

## 2017-10-20 RX ORDER — FUROSEMIDE 10 MG/ML
200 INJECTION INTRAMUSCULAR; INTRAVENOUS ONCE
Status: COMPLETED | OUTPATIENT
Start: 2017-10-20 | End: 2017-10-20

## 2017-10-20 RX ORDER — VALGANCICLOVIR 450 MG/1
450 TABLET, FILM COATED ORAL DAILY
Qty: 30 TABLET | Refills: 2 | Status: SHIPPED | OUTPATIENT
Start: 2017-10-20 | End: 2017-11-07 | Stop reason: ALTCHOICE

## 2017-10-20 RX ORDER — MYCOPHENOLATE MOFETIL 200 MG/ML
1000 POWDER, FOR SUSPENSION ORAL 2 TIMES DAILY
Status: DISCONTINUED | OUTPATIENT
Start: 2017-10-20 | End: 2017-10-25

## 2017-10-20 RX ORDER — METHYLPREDNISOLONE SOD SUCC 125 MG
60 VIAL (EA) INJECTION EVERY 12 HOURS
Status: COMPLETED | OUTPATIENT
Start: 2017-10-23 | End: 2017-10-23

## 2017-10-20 RX ORDER — MYCOPHENOLATE MOFETIL 250 MG/1
1000 CAPSULE ORAL 2 TIMES DAILY
Qty: 240 CAPSULE | Refills: 2 | Status: CANCELLED | OUTPATIENT
Start: 2017-10-19

## 2017-10-20 RX ORDER — POTASSIUM CHLORIDE 29.8 MG/ML
40 INJECTION INTRAVENOUS ONCE
Status: COMPLETED | OUTPATIENT
Start: 2017-10-20 | End: 2017-10-20

## 2017-10-20 RX ORDER — FLUCONAZOLE 200 MG/1
200 TABLET ORAL DAILY
Qty: 30 TABLET | Refills: 0 | Status: CANCELLED | OUTPATIENT
Start: 2017-10-19 | End: 2017-11-18

## 2017-10-20 RX ORDER — FAMOTIDINE 20 MG/1
20 TABLET, FILM COATED ORAL NIGHTLY
Qty: 60 TABLET | Refills: 5 | Status: CANCELLED | OUTPATIENT
Start: 2017-10-20

## 2017-10-20 RX ORDER — VALGANCICLOVIR HYDROCHLORIDE 50 MG/ML
450 POWDER, FOR SOLUTION ORAL EVERY MORNING
Status: DISCONTINUED | OUTPATIENT
Start: 2017-10-20 | End: 2017-10-23

## 2017-10-20 RX ORDER — METHYLPREDNISOLONE SOD SUCC 125 MG
100 VIAL (EA) INJECTION EVERY 12 HOURS
Status: COMPLETED | OUTPATIENT
Start: 2017-10-21 | End: 2017-10-21

## 2017-10-20 RX ORDER — MAGNESIUM SULFATE HEPTAHYDRATE 40 MG/ML
2 INJECTION, SOLUTION INTRAVENOUS ONCE
Status: COMPLETED | OUTPATIENT
Start: 2017-10-20 | End: 2017-10-20

## 2017-10-20 RX ORDER — FUROSEMIDE 10 MG/ML
160 INJECTION INTRAMUSCULAR; INTRAVENOUS ONCE
Status: COMPLETED | OUTPATIENT
Start: 2017-10-20 | End: 2017-10-20

## 2017-10-20 RX ORDER — VALGANCICLOVIR 450 MG/1
450 TABLET, FILM COATED ORAL DAILY
Qty: 30 TABLET | Refills: 2 | Status: CANCELLED | OUTPATIENT
Start: 2017-10-19 | End: 2018-01-17

## 2017-10-20 RX ORDER — NYSTATIN 100000 [USP'U]/ML
500000 SUSPENSION ORAL 4 TIMES DAILY
Status: DISCONTINUED | OUTPATIENT
Start: 2017-10-20 | End: 2017-10-20

## 2017-10-20 RX ORDER — FLUCONAZOLE 2 MG/ML
200 INJECTION, SOLUTION INTRAVENOUS
Status: DISCONTINUED | OUTPATIENT
Start: 2017-10-20 | End: 2017-10-24

## 2017-10-20 RX ORDER — FAMOTIDINE 20 MG/1
20 TABLET, FILM COATED ORAL NIGHTLY
Qty: 30 TABLET | Refills: 5 | Status: SHIPPED | OUTPATIENT
Start: 2017-10-20 | End: 2017-11-08 | Stop reason: ALTCHOICE

## 2017-10-20 RX ORDER — MUPIROCIN 20 MG/G
1 OINTMENT TOPICAL 2 TIMES DAILY
Status: DISPENSED | OUTPATIENT
Start: 2017-10-20 | End: 2017-10-25

## 2017-10-20 RX ORDER — PREDNISONE 10 MG/1
TABLET ORAL
Qty: 60 TABLET | Refills: 0 | Status: SHIPPED | OUTPATIENT
Start: 2017-10-20 | End: 2017-11-08

## 2017-10-20 RX ORDER — MAGNESIUM SULFATE HEPTAHYDRATE 40 MG/ML
2 INJECTION, SOLUTION INTRAVENOUS
Status: DISCONTINUED | OUTPATIENT
Start: 2017-10-20 | End: 2017-10-21

## 2017-10-20 RX ORDER — FAMOTIDINE 10 MG/ML
20 INJECTION INTRAVENOUS EVERY 12 HOURS
Status: DISCONTINUED | OUTPATIENT
Start: 2017-10-20 | End: 2017-10-24

## 2017-10-20 RX ORDER — TACROLIMUS 1 MG/1
6 CAPSULE ORAL EVERY 12 HOURS
Qty: 360 CAPSULE | Refills: 11 | Status: SHIPPED | OUTPATIENT
Start: 2017-10-20 | End: 2017-11-07 | Stop reason: ALTCHOICE

## 2017-10-20 RX ORDER — PREDNISONE 10 MG/1
20 TABLET ORAL DAILY
Status: DISCONTINUED | OUTPATIENT
Start: 2017-10-26 | End: 2017-11-04

## 2017-10-20 RX ADMIN — HEPARIN SODIUM 5000 UNITS: 5000 INJECTION, SOLUTION INTRAVENOUS; SUBCUTANEOUS at 01:10

## 2017-10-20 RX ADMIN — POTASSIUM CHLORIDE 40 MEQ: 400 INJECTION, SOLUTION INTRAVENOUS at 05:10

## 2017-10-20 RX ADMIN — FLUCONAZOLE 200 MG: 2 INJECTION INTRAVENOUS at 05:10

## 2017-10-20 RX ADMIN — PHYTONADIONE 10 MG: 10 INJECTION, EMULSION INTRAMUSCULAR; INTRAVENOUS; SUBCUTANEOUS at 01:10

## 2017-10-20 RX ADMIN — SODIUM CHLORIDE 1 UNITS/HR: 9 INJECTION, SOLUTION INTRAVENOUS at 05:10

## 2017-10-20 RX ADMIN — FAMOTIDINE 20 MG: 10 INJECTION, SOLUTION INTRAVENOUS at 08:10

## 2017-10-20 RX ADMIN — PROPOFOL 35 MCG/KG/MIN: 10 INJECTION, EMULSION INTRAVENOUS at 11:10

## 2017-10-20 RX ADMIN — CALCIUM CHLORIDE 1000 MG: 100 INJECTION, SOLUTION INTRAVENOUS at 12:10

## 2017-10-20 RX ADMIN — MORPHINE 450 MG: 10 SOLUTION ORAL at 07:10

## 2017-10-20 RX ADMIN — MORPHINE SULFATE 1 MG: 2 INJECTION, SOLUTION INTRAMUSCULAR; INTRAVENOUS at 03:10

## 2017-10-20 RX ADMIN — VASOPRESSIN 5 UNITS: 20 INJECTION, SOLUTION INTRAMUSCULAR; SUBCUTANEOUS at 12:10

## 2017-10-20 RX ADMIN — Medication 1000 MG: at 09:10

## 2017-10-20 RX ADMIN — FUROSEMIDE 200 MG: 10 INJECTION, SOLUTION INTRAVENOUS at 09:10

## 2017-10-20 RX ADMIN — FENTANYL CITRATE 250 MCG: 50 INJECTION, SOLUTION INTRAMUSCULAR; INTRAVENOUS at 03:10

## 2017-10-20 RX ADMIN — DEXTROSE AND SODIUM CHLORIDE: 5; .45 INJECTION, SOLUTION INTRAVENOUS at 03:10

## 2017-10-20 RX ADMIN — PROPOFOL 25 MCG/KG/MIN: 10 INJECTION, EMULSION INTRAVENOUS at 08:10

## 2017-10-20 RX ADMIN — MUPIROCIN 1 G: 20 OINTMENT TOPICAL at 08:10

## 2017-10-20 RX ADMIN — Medication 1 MG: at 05:10

## 2017-10-20 RX ADMIN — Medication 1 MG: at 07:10

## 2017-10-20 RX ADMIN — NYSTATIN 500000 UNITS: 500000 SUSPENSION ORAL at 05:10

## 2017-10-20 RX ADMIN — PROPOFOL 40 MCG/KG/MIN: 10 INJECTION, EMULSION INTRAVENOUS at 07:10

## 2017-10-20 RX ADMIN — MAGNESIUM SULFATE IN WATER 2 G: 40 INJECTION, SOLUTION INTRAVENOUS at 11:10

## 2017-10-20 RX ADMIN — DEXTROSE AND SODIUM CHLORIDE: 5; .45 INJECTION, SOLUTION INTRAVENOUS at 05:10

## 2017-10-20 RX ADMIN — PHYTONADIONE 10 MG: 10 INJECTION, EMULSION INTRAMUSCULAR; INTRAVENOUS; SUBCUTANEOUS at 09:10

## 2017-10-20 RX ADMIN — MEROPENEM AND SODIUM CHLORIDE 1 G: 1 INJECTION, SOLUTION INTRAVENOUS at 01:10

## 2017-10-20 RX ADMIN — MORPHINE SULFATE 1 MG: 2 INJECTION, SOLUTION INTRAMUSCULAR; INTRAVENOUS at 07:10

## 2017-10-20 RX ADMIN — ATOVAQUONE 1500 MG: 750 SUSPENSION ORAL at 10:10

## 2017-10-20 RX ADMIN — SODIUM CHLORIDE 3 UNITS/HR: 9 INJECTION, SOLUTION INTRAVENOUS at 11:10

## 2017-10-20 RX ADMIN — MEROPENEM AND SODIUM CHLORIDE 1 G: 1 INJECTION, SOLUTION INTRAVENOUS at 08:10

## 2017-10-20 RX ADMIN — HEPARIN SODIUM 5000 UNITS: 5000 INJECTION, SOLUTION INTRAVENOUS; SUBCUTANEOUS at 05:10

## 2017-10-20 RX ADMIN — PHYTONADIONE 10 MG: 10 INJECTION, EMULSION INTRAMUSCULAR; INTRAVENOUS; SUBCUTANEOUS at 07:10

## 2017-10-20 RX ADMIN — CHLOROTHIAZIDE SODIUM 1000 MG: 500 INJECTION, POWDER, LYOPHILIZED, FOR SOLUTION INTRAVENOUS at 10:10

## 2017-10-20 RX ADMIN — HEPARIN SODIUM 5000 UNITS: 5000 INJECTION, SOLUTION INTRAVENOUS; SUBCUTANEOUS at 09:10

## 2017-10-20 RX ADMIN — FUROSEMIDE 160 MG: 10 INJECTION, SOLUTION INTRAVENOUS at 01:10

## 2017-10-20 RX ADMIN — AMPICILLIN AND SULBACTAM 3 G: 2; 1 INJECTION, POWDER, FOR SOLUTION INTRAVENOUS at 06:10

## 2017-10-20 RX ADMIN — MEROPENEM AND SODIUM CHLORIDE 1 G: 1 INJECTION, SOLUTION INTRAVENOUS at 05:10

## 2017-10-20 NOTE — PROGRESS NOTES
[facility]  Adult Nutrition  Progress Note    SUMMARY     Recommendations    Recommendation/Intervention: Once extubated, advance diet as tolerated to low sodium diet. If unable to advance diet within 48 hours, recommend enteral nutrition given poor nutrition status prior to tx.     RD to provide post tx nutrition education once pt steps down to TSU. RD following.     Goals: Pt to return to nutritionally adequate diet within 48 hours  Nutrition Goal Status: new  Communication of RD Recs: discussed on rounds    Reason for Assessment    Reason for Assessment: RD follow-up, physician consult  Diagnosis: transplant/postoperative complications (OLTx 10/19)  Relevent Medical History: none significant aside from EtOH abuse   Interdisciplinary Rounds: attended     General Information Comments: POD#1 s/p OLTx, pt sedated and on propofol, tolerated CRRT during procedure, mother at bedside this AM    Nutrition Discharge Planning: RD to provide post transplant nutrition education once pt steps down to TSU    Nutrition Prescription Ordered    Current Diet Order: NPO  Nutrition Order Comments: was on high protein, high calorie  Current Nutrition Support Formula Ordered: Discontinued  Current Nutrition Support Rate Ordered: 0 (ml)  Current Nutrition Support Frequency Ordered: mL/hr  Oral Nutrition Supplement: Boost Plus (strawberry)     Evaluation of Received Nutrients/Fluid Intake    Enteral Calories (kcal): 0  Enteral Protein (gm): 0  Enteral (Free Water) Fluid (mL): 0     Parenteral Calories (kcal): 0  Parenteral Protein (gm): 0  Parenteral Fluid (mL): 0          Energy Calories Required: not meeting needs  % Kcal Needs: 0          Protein Required: not meeting needs  % Protein Needs: 0     IV Fluid (mL): 7390     GIR (Glucose Infusion Rate) (mg/kg/min): 0 mg/kg/min     I/O: + 1595 mL x 24 hrs, +8565 mL since admit         Fluid Required: not meeting needs  Comments: LBM 10/10  Tolerance: tolerating  %  "Intake of Estimated Energy Needs: 0 - 25 %  % Meal Intake: NPO     Nutrition Risk Screen     Nutrition Risk Screen: dysphagia or difficulty swallowing    Nutrition/Diet History    Patient Reported Diet/Restrictions/Preferences: low salt  Typical Food/Fluid Intake: Very poor intake for 2-3 weeks PTA  Food Preferences: No Holiness or cultural food preferences noted        Factors Affecting Nutritional Intake: abdominal pain, abdominal distention, decreased appetite, nausea/vomiting, altered gastrointestinal function                Labs/Tests/Procedures/Meds       Pertinent Labs Reviewed: reviewed  Pertinent Labs Comments: K 3.3, CO2 20, BUN 37, glu 217, TBili 17.5, ,   Pertinent Medications Reviewed: reviewed  Pertinent Medications Comments: heparin, prednisone, tacrolimus, D5, propofol    Physical Findings    Overall Physical Appearance: edematous  Tubes:  (none)  Oral/Mouth Cavity:  (blister (lower lip))  Skin: jaundice, incision (+ drains)    Anthropometrics    Temp: 97.9 °F (36.6 °C)     Height: 5' 7" (170.2 cm)  Weight Method: Bed Scale  Weight: 96.6 kg (212 lb 15.4 oz)  Ideal Body Weight (IBW), Male: 148 lb     % Ideal Body Weight, Male (lb): 143.9 lb     BMI (Calculated): 33.4  BMI Grade: 25 - 29.9 - overweight     Usual Body Weight (UBW), kg:  (2/2 fluid shifts)      Estimated/Assessed Needs    Weight Used For Calorie Calculations: 83.4 kg (183 lb 13.8 oz)      Energy Calorie Requirements (kcal): 2817-6096 kcal/d  Energy Need Method: [facility]        RMR ([place]. Jeor Equation): 1762.62      Weight Used For Protein Calculations: 83.4 kg (183 lb 13.8 oz)  Protein Requirements:  g/d    Fluid Need Method: RDA Method (1ml/kcal or per MD)      RDA Method (mL): 2114        Assessment and Plan    * Liver transplant 10/19/2017 for acute alcoholic hepatitis    Nutrition Diagnosis:  Increased nutrient needs    Related to (etiology):   protein for healing and muscle maintenance    Signs and " Symptoms (as evidenced by):   S/p OLTx 10/19 and poor intake prior to sgy    Interventions/Recommendations (treatment strategy):  See RD recs above    Nutrition Diagnosis Status:   New              Monitor and Evaluation    Food and Nutrient Intake: energy intake  Food and Nutrient Adminstration: diet order, enteral and parenteral nutrition administration     Physical Activity and Function: nutrition-related ADLs and IADLs  Anthropometric Measurements: weight, weight change, body mass index  Biochemical Data, Medical Tests and Procedures: inflammatory profile, lipid profile, glucose/endocrine profile, gastrointestinal profile, electrolyte and renal panel  Nutrition-Focused Physical Findings: overall appearance    Nutrition Risk    Level of Risk:  (RD f/u 2x wk)    Nutrition Follow-Up    RD Follow-up?: Yes

## 2017-10-20 NOTE — PLAN OF CARE
Problem: Patient Care Overview  Goal: Plan of Care Review  Dx: Liver transplant   Hx: alcoholic cirrhosis (1/5 of alcohol/ day), hepatic encephalopathy, portal HTN, HTN, LAURA, former smoker, Miller Trell's syndrome, paracentesis x3, ascites, hematoemesis with esophageal varices    10/20: OR: 6 PRBC, 5 FFP, 1 cryo, 1 platelets, 6L isolyte, 1375 cell saver, 1500 ascites, admitted to SICU; d/c swan       Goal: systolic 100-160  accuchecks q1  CBC, AST, PTT, INR q4   Outcome: Ongoing (interventions implemented as appropriate)  Vs & assessment as documented. Sedation off at this time. Pt sleepy easily arousable follows commands. Pt remains intubated on SIMV RR 12 FIO2 @40% PEEP 5. Urine out put remains marginal after Lasix 160mg given. Plan of care reviewed with patient ad mother. Will cont to monitor

## 2017-10-20 NOTE — HPI
Reason for Consult: Management of Hyperglycemia     Surgical Procedure and Date: OLT 10/20/2017    HPI: Patient is a 27 y.o. 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 No personal or family h/o DM. Endocrine consulted for BG management.

## 2017-10-20 NOTE — ANESTHESIA POSTPROCEDURE EVALUATION
"Anesthesia Post Evaluation    Patient: Jhonny Diana    Procedure(s) Performed: Procedure(s) (LRB):  TRANSPLANT-LIVER in OR 2:30p, cut 4p (N/A)    Final Anesthesia Type: general  Patient location during evaluation: ICU  Patient participation: No - Unable to Participate, Intubation  Level of consciousness: awake and alert  Post-procedure vital signs: reviewed and stable  Pain management: adequate  Airway patency: patent  PONV status at discharge: No PONV  Anesthetic complications: no      Cardiovascular status: blood pressure returned to baseline and stable  Respiratory status: unassisted  Hydration status: euvolemic  Follow-up not needed.        Visit Vitals  /70 (BP Location: Left arm, Patient Position: Lying)   Pulse 97   Temp 36.5 °C (97.7 °F) (Core ([place]))   Resp 12   Ht 5' 7" (1.702 m)   Wt 96.6 kg (212 lb 15.4 oz)   SpO2 100%   BMI 33.35 kg/m²       Pain/Diogenes Score: Pain Assessment Performed: Yes (10/20/2017  7:00 AM)  Presence of Pain: complains of pain/discomfort (10/20/2017  7:00 AM)  Pain Rating Prior to Med Admin: 6 (10/20/2017  7:11 AM)      "

## 2017-10-20 NOTE — ANESTHESIA PROCEDURE NOTES
Central Line    Diagnosis: ESLD  Patient location during procedure: done in OR  Procedure start time: 10/19/2017 6:21 PM  Timeout: 10/19/2017 6:20 PM  Procedure end time: 10/19/2017 6:40 PM  Staffing  Anesthesiologist: KENDRA COLBY  Resident/CRNA: LOC EMMANUEL  Performed: resident/CRNA   Anesthesiologist was present at the time of the procedure.  Preanesthetic Checklist  Completed: patient identified, site marked, surgical consent, pre-op evaluation, timeout performed, IV checked, risks and benefits discussed, monitors and equipment checked and anesthesia consent given  Indication  Indication: hemodynamic monitoring, vascular access, med administration, hemodialysis     Anesthesia   general anesthesia    Central Line  Skin Prep: skin prepped with ChloraPrep, skin prep agent completely dried prior to procedure  maximum sterile barriers used during central venous catheter insertion  hand hygiene performed prior to central venous catheter insertion  Location: right internal jugular,   Catheter type: triple lumen  Catheter Size: 12 Fr  Ultrasound: vascular probe with ultrasound  Vessel Caliber: medium, patent  Vascular Doppler:  not done, compressibility normal  Needle advanced into vessel with real time Ultrasound guidance.  Guidewire confirmed in vessel.  Image recorded and saved.   Manometry: Venous cannualation confirmed by visual estimation of blood vessel pressure using manometry.  Insertion Attempts: 1   Securement:line sutured, chlorhexidine patch, sterile dressing applied and blood return through all ports     Post-Procedure  Adverse Events:none

## 2017-10-20 NOTE — ANESTHESIA PROCEDURE NOTES
[place]    Diagnosis: ESLD  Patient location during procedure: done in OR  Procedure start time: 10/19/2017 6:21 PM  Timeout: 10/19/2017 6:20 PM  Procedure end time: 10/19/2017 6:40 PM  Staffing  Anesthesiologist: KENDRA COLBY  Resident/CRNA: LOC EMMANUEL  Performed: resident/CRNA   Anesthesiologist was present at the time of the procedure.  Preanesthetic Checklist  Completed: patient identified, site marked, surgical consent, pre-op evaluation, timeout performed, IV checked, risks and benefits discussed, monitors and equipment checked and anesthesia consent given  [place] Line  Skin Prep: chlorhexidine gluconate  Location: right,  internal jugular vein  Vessel Caliber: medium, patent, compressibility normal  Vascular Doppler:  not done  Introducer: 9 Fr single lumen, manometry used.  Device: CCO/Oximetric Catheter  Catheter Size: 7 Fr  Catheter placement by yes. Heme positive aspiration all ports. PAC floated with balloon up not wedgedSterile sheath used  Locked at: 48 cm.Insertion Attempts: 1  Indication: intravenous therapy, hemodynamic monitoring  Ultrasound Guidance  Needle advanced into vessel with real time Ultrasound guidance.  Guidewire confirmed in vessel.  Sterile sheath used.  Assessment  Central Line Bundle Protocol followed. Hand hygiene before procedure, surgical cap worn, surgical mask worn, sterile surgical gloves worn, large sterile drape used.  Verification: blood return and ultrasound  Dressing: sutured in place and taped and tegaderm  Patient: Tolerated Well

## 2017-10-20 NOTE — ANESTHESIA PROCEDURE NOTES
Arterial    Diagnosis: ESLD    Patient location during procedure: done in OR  Procedure start time: 10/19/2017 6:00 PM  Timeout: 10/19/2017 5:55 PM  Procedure end time: 10/19/2017 6:15 PM  Staffing  Anesthesiologist: KENDRA COLBY  Resident/CRNA: LOC EMMANUEL  Performed: resident/CRNA   Anesthesiologist was present at the time of the procedure.  Preanesthetic Checklist  Completed: patient identified, site marked, surgical consent, pre-op evaluation, timeout performed, IV checked, risks and benefits discussed, monitors and equipment checked and anesthesia consent givenArterial  Skin Prep: chlorhexidine gluconate and isopropyl alcohol  Orientation: right  Location: femoral  Catheter Size: 4 Fr Cook  Catheter placement by Ultrasound guidance. Heme positive aspiration all ports.  Vessel Caliber: medium, patent, compressibility normal  Vascular Doppler:  not done  Needle advanced into vessel with real time Ultrasound guidance.  Guidewire confirmed in vessel.  Sterile sheath used.Insertion Attempts: 1  Assessment  Dressing: sutured in place and taped  Patient: Tolerated well

## 2017-10-20 NOTE — PROGRESS NOTES
The patient arrived from the OR and was placed on 100%/12/500/+5/+10 SIMV. An ABG was obtained and the fio2 was decreased to 80%

## 2017-10-20 NOTE — PROGRESS NOTES
SW met with pts mother at bedside for continuity of care as patient remains on ventilator after transplant surgery yesterday.  Pt smom staying in room with patient at this time.   Pts mom has support from her sister who is here with her and staying at the [facility].   Pts mom denies any coping issues.   SW discussed local pharmacy for dc meds, pts mom would like for pt to use [facility] if possible. EZEKIEL discussed with pts mom, that pt is on wait list for local transplant lodging.  Pts mom has not had an opportunity to complete the local lodging transplant financial profile, but will work on that over the weekend.  Mom denies any psychosocial needs at this time.  SW will continue to follow for all transplant resources, education, psychosocial support,  transplant lodging financial profile on Monday and assist with all dc planning needs.

## 2017-10-20 NOTE — SUBJECTIVE & OBJECTIVE
Interval History: S/p Liver transplant, currently intubated.  Mother reports patient had some agitation on vent now resolved.  Per nursing urine output 30cc per hour in the AM.        Review of patient's allergies indicates:   Allergen Reactions    Bactrim [sulfamethoxazole-trimethoprim] Other (See Comments)     Mookie Trell Syndrome     Current Facility-Administered Medications   Medication Frequency    dextrose 5 % and 0.45 % NaCl infusion Continuous    dextrose 50% injection 12.5 g PRN    dextrose 50% injection 12.5 g PRN    dextrose 50% injection 25 g PRN    dextrose 50% injection 25 g PRN    famotidine (PF) injection 20 mg Q12H    fluconazole (DIFLUCAN) IVPB 200 mg 100 mL Q24H    heparin (porcine) injection 5,000 Units Q8H    insulin regular (Humulin R) 100 Units in sodium chloride 0.9% 100 mL infusion Continuous    meropenem-0.9% sodium chloride 1 g/50 mL IVPB Q8H    [START ON 10/21/2017] methylPREDNISolone sodium succinate injection 100 mg Q12H    Followed by    [START ON 10/22/2017] methylPREDNISolone sodium succinate injection 80 mg Q12H    Followed by    [START ON 10/23/2017] methylPREDNISolone sodium succinate injection 60 mg Q12H    Followed by    [START ON 10/24/2017] methylPREDNISolone sodium succinate injection 40 mg Q12H    Followed by    [START ON 10/25/2017] methylPREDNISolone sodium succinate injection 20 mg Q12H    Followed by    [START ON 10/26/2017] predniSONE tablet 20 mg Daily    morphine injection 1 mg Q2H PRN    mupirocin 2 % ointment 1 g BID    mycophenolate mofetil 200 mg/mL suspension 1,000 mg BID    phytonadione vitamin k (AQUA-MEPHYTON) 10 mg in dextrose 5 % 50 mL IVPB Q8H    propofol (DIPRIVAN) 10 mg/mL infusion Continuous    tacrolimus (PROGRAF) 1 mg/mL oral syringe BID    valganciclovir 50 mg/ml oral solution 450 mg Daily       Objective:     Vital Signs (Most Recent):  Temp: 97.9 °F (36.6 °C) (10/20/17 1100)  Pulse: 93 (10/20/17 1500)  Resp: 12 (10/20/17  0349)  BP: 130/74 (10/20/17 1500)  SpO2: 100 % (10/20/17 1500)  O2 Device (Oxygen Therapy): ventilator (10/20/17 1500) Vital Signs (24h Range):  Temp:  [97.7 °F (36.5 °C)-99.3 °F (37.4 °C)] 97.9 °F (36.6 °C)  Pulse:  [] 93  Resp:  [12] 12  SpO2:  [97 %-100 %] 100 %  BP: (119-130)/(58-74) 130/74  Arterial Line BP: (106-143)/(48-71) 143/70     Weight: 96.6 kg (212 lb 15.4 oz) (10/18/17 1341)  Body mass index is 33.35 kg/m².  Body surface area is 2.14 meters squared.    I/O last 3 completed shifts:  In: 56158 [P.O.:360; I.V.:7400; Blood:3996; Other:500]  Out: 96439 [Urine:1758; Drains:1885; Other:1828; Blood:5500]    Physical Exam   Constitutional: He is easily aroused. He is sedated and intubated.   Intubated    HENT:   Head: Normocephalic and atraumatic.   Eyes: EOM are normal. Scleral icterus is present.   Cardiovascular: Normal rate and regular rhythm.    Pulmonary/Chest: Effort normal and breath sounds normal. He is intubated.   Abdominal: He exhibits distension.   Musculoskeletal: He exhibits edema. He exhibits no deformity.   Neurological: He is easily aroused.   Skin: Skin is warm and dry. He is not diaphoretic.   Psychiatric: He has a normal mood and affect. His behavior is normal.       Significant Labs:  All labs within the past 24 hours have been reviewed.   Creatine 1.2, Potassium 3.3     Significant Imaging:  Labs: Reviewed  X-Ray: Reviewed

## 2017-10-20 NOTE — PROGRESS NOTES
Met with patient's mother and her sister, Sophy, in the ICU.  Patient intubated at this time.  Gave patient's mother the discharge book-Discharge Instructions for Liver Transplant Patients.  Asked her to read the book in preparation for education.  Allowed time for questions and answers.

## 2017-10-20 NOTE — PROGRESS NOTES
Patient admitted from OR to SICU room 6016. Connected to main monitor and vent. Assessment performed. Labs obtained. Vaso weaned off. Guevara off. Vitals stable. Dr. Culp at bedside to assess patient.

## 2017-10-20 NOTE — PT/OT/SLP DISCHARGE
Occupational Therapy Discharge Summary    Jhonny Diana  MRN: [id number]   Liver replaced by transplant   Patient Discharged from acute Occupational Therapy on 10/20/17.     Assessment:   Patient appropriate for care in another setting.  GOALS:    Occupational Therapy Goals        Problem: Occupational Therapy Goal    Goal Priority Disciplines Outcome Interventions   Occupational Therapy Goal     OT, PT/OT Ongoing (interventions implemented as appropriate)    Description:  Goals to be met by:  2 Weeks (2017)    Patient will increase functional independence with ADLs by performin. Supine to sit with Turner.  2. Sit to Stand transfers with Supervision.   3. Toilet transfer to toilet with Turner.  4. Grooming while standing at sink with Turner.  5. UE Dressing with Turner.  6. LE Dressing with Turner.                              Reasons for Discontinuation of Therapy Services  Transfer to alternate level of care.      Plan:  Patient Discharged to: t/f to ICU s/p liver transplant; will await new orders to resume tx.

## 2017-10-20 NOTE — PROGRESS NOTES
[facility]  Nephrology  Progress Note    Patient Name: Jhonny Diana  MRN: [id number]  Admission Date: 9/27/2017  Hospital Length of Stay: 23 days  Attending Provider: Solis Chapa MD   Primary Care Physician: Provider Notinsystem  Principal Problem:Liver replaced by transplant    Subjective:     HPI: Jhonny Diana is a 28 yo male with pMHX of childhood asthma and alcohol abuse who was was transferred to [facility] for hepatology evaluation for worsening liver functions.  Information was obtained from family at bedside and review of outside records, given that patient is obtunded.  According to mother at bedside, patient has had multiple admissions to the hospital over the past 6 months for recurrent abdominal pain.  She reports that he has been been seen the ED and has been diagnosed with diverticulitis, UTIs, gastroenteritis, and cholecystitis.  He was never admitted to the hospital and was always discharged.  On 09/06, patient presented to OSH for worsening abdominal pain and distension.  He was diagnosed at that time with ETOH hepatitis.  He was treated with steroids, ceftriaxone for SBP prophylaxis, and diuresed at that time for volume management.  He was discharged home on the 15th, after clearance from GI,a nd was instructed to follow-up with his PCP 1 week after discharge.  He presented to the hospital on 09/25 for worsening abdominal pain and distension with 20 kg weight gain.  He was transferred to [facility] for further care and Nephrology was consulted for LAURA.    On review of outside records, patient Scr on admission was 1.5 (with baseline at 1.0).  According to the family at the bedside, he was not urinating well at the OSH, and not responding to diuretics (spirionolactone/lasix combo).  His mother reports that patient continued to c/o abdominal and inability to urinate, but no chicas was placed until transfer to [facility] yesterday.  Record reviews shows relative hypotension with BPs in the low 90's.  His  mother reports that the nursing staff was administrating his narcotic regimen based on his blood pressures, and at times, medication was held due to hypotension.  Since admission to hospital, his UOP has improved, responding well in Lasix 40 IV (770 ml since admission, 700 ml so far this shift at time of consultation).  Admit labs revealed a SCr of 2.6, this morning improved down to 2.4.  Bilirubin remains elevated, >14.  Abdomen remains with tight distension, unable to perform paracentesis, as no significant fluid pocket found.  Mother reports patient did not have para's done at OSH.    Labs on the morning of 09/27 with bicarb of 15.  ABG with alkalemia and primary respiratory alkalosis and anion gap acidosis.     Interval History: S/p Liver transplant, currently intubated.  Mother reports patient had some agitation on vent now resolved.  Per nursing urine output 30cc per hour in the AM.        Review of patient's allergies indicates:   Allergen Reactions    Bactrim [sulfamethoxazole-trimethoprim] Other (See Comments)     Mookie Trell Syndrome     Current Facility-Administered Medications   Medication Frequency    dextrose 5 % and 0.45 % NaCl infusion Continuous    dextrose 50% injection 12.5 g PRN    dextrose 50% injection 12.5 g PRN    dextrose 50% injection 25 g PRN    dextrose 50% injection 25 g PRN    famotidine (PF) injection 20 mg Q12H    fluconazole (DIFLUCAN) IVPB 200 mg 100 mL Q24H    heparin (porcine) injection 5,000 Units Q8H    insulin regular (Humulin R) 100 Units in sodium chloride 0.9% 100 mL infusion Continuous    meropenem-0.9% sodium chloride 1 g/50 mL IVPB Q8H    [START ON 10/21/2017] methylPREDNISolone sodium succinate injection 100 mg Q12H    Followed by    [START ON 10/22/2017] methylPREDNISolone sodium succinate injection 80 mg Q12H    Followed by    [START ON 10/23/2017] methylPREDNISolone sodium succinate injection 60 mg Q12H    Followed by    [START ON 10/24/2017]  methylPREDNISolone sodium succinate injection 40 mg Q12H    Followed by    [START ON 10/25/2017] methylPREDNISolone sodium succinate injection 20 mg Q12H    Followed by    [START ON 10/26/2017] predniSONE tablet 20 mg Daily    morphine injection 1 mg Q2H PRN    mupirocin 2 % ointment 1 g BID    mycophenolate mofetil 200 mg/mL suspension 1,000 mg BID    phytonadione vitamin k (AQUA-MEPHYTON) 10 mg in dextrose 5 % 50 mL IVPB Q8H    propofol (DIPRIVAN) 10 mg/mL infusion Continuous    tacrolimus (PROGRAF) 1 mg/mL oral syringe BID    valganciclovir 50 mg/ml oral solution 450 mg Daily       Objective:     Vital Signs (Most Recent):  Temp: 97.9 °F (36.6 °C) (10/20/17 1100)  Pulse: 93 (10/20/17 1500)  Resp: 12 (10/20/17 0349)  BP: 130/74 (10/20/17 1500)  SpO2: 100 % (10/20/17 1500)  O2 Device (Oxygen Therapy): ventilator (10/20/17 1500) Vital Signs (24h Range):  Temp:  [97.7 °F (36.5 °C)-99.3 °F (37.4 °C)] 97.9 °F (36.6 °C)  Pulse:  [] 93  Resp:  [12] 12  SpO2:  [97 %-100 %] 100 %  BP: (119-130)/(58-74) 130/74  Arterial Line BP: (106-143)/(48-71) 143/70     Weight: 96.6 kg (212 lb 15.4 oz) (10/18/17 1341)  Body mass index is 33.35 kg/m².  Body surface area is 2.14 meters squared.    I/O last 3 completed shifts:  In: 73825 [P.O.:360; I.V.:7400; Blood:3996; Other:500]  Out: 15524 [Urine:1758; Drains:1885; Other:1828; Blood:5500]    Physical Exam   Constitutional: He is easily aroused. He is sedated and intubated.   Intubated    HENT:   Head: Normocephalic and atraumatic.   Eyes: EOM are normal. Scleral icterus is present.   Cardiovascular: Normal rate and regular rhythm.    Pulmonary/Chest: Effort normal and breath sounds normal. He is intubated.   Abdominal: He exhibits distension.   Musculoskeletal: He exhibits edema. He exhibits no deformity.   Neurological: He is easily aroused.   Skin: Skin is warm and dry. He is not diaphoretic.   Psychiatric: He has a normal mood and affect. His behavior is normal.        Significant Labs:  All labs within the past 24 hours have been reviewed.   Creatine 1.2, Potassium 3.3     Significant Imaging:  Labs: Reviewed  X-Ray: Reviewed    Assessment/Plan:     LAURA (acute kidney injury)    S/p Liver transplant and HD during surgery    - likely ischemic ATN from decreased renal perfusion (volume shifts from paracentesis, anemia)  - urine sediment with muddy brown casts  - Continue with NaBicarb supplementation to correct acidosis  - Failed trial of lasix 160  - Recommend Lasix 200 IV and Diuril 1 gram  - Will closely follow labs          Case discussed with fellow, staff attestation to follow  Thank you for your consult. I will follow-up with patient. Please contact us if you have any additional questions.    Anoop Nina MD  Nephrology  [facility]    ATTENDING PHYSICIAN ATTESTATION  I have personally interviewed and examined the patient. I thoroughly reviewed the demographic, clinical, laboratorial and imaging information available in medical records. I agree with the assessment and recommendations provided by the subspecialty resident. Dr. Nina was under my supervision.

## 2017-10-20 NOTE — ASSESSMENT & PLAN NOTE
S/p Liver transplant and HD during surgery    - likely ischemic ATN from decreased renal perfusion (volume shifts from paracentesis, anemia)  - urine sediment with muddy brown casts  - Continue with NaBicarb supplementation to correct acidosis  - Failed trial of lasix 160  - Recommend Lasix 200 IV and Diuril 1 gram  - Will closely follow labs

## 2017-10-20 NOTE — ASSESSMENT & PLAN NOTE
Nutrition Diagnosis:  Increased nutrient needs    Related to (etiology):   protein for healing and muscle maintenance    Signs and Symptoms (as evidenced by):   S/p OLTx 10/19 and poor intake prior to sgy    Interventions/Recommendations (treatment strategy):  See RD recs above    Nutrition Diagnosis Status:   New

## 2017-10-20 NOTE — PROGRESS NOTES
[facility]  Transplant Surgery  Progress Note      Hospital Day Hospital Day: 24  Post-Op Day 1 Day Post-Op    Admit Diagnosis: ESLD  Procedures: OLTx    ORGAN:   LIVER  Disease Etiology: Acute Alcoholic Hepatitis  Donor Type:    - Brain Death  CDC High Risk:   No  Donor CMV Status:   Donor CMV Status: Positive  Donor HBcAB:   Negative  Donor HCV Status:   Negative  Whole or Partial: Whole Liver  Biliary Anastomosis: End to End  Arterial Anatomy: Standard      Subjective:     Interval History:   To SICU around 4am, off pressors, vent weaned. Wakes up and follows commands. UOP decreasing. Lost 5.5L blood and 1.5L ascites, rec'd cell saver 1.3L, 6L crystalloid, 1 cryo, 6 prbcs, 4 FFP, 1 platelets. CVP 7. REc'd intraop dialysis. CXR with fluffy bilat infiltrates.     Medications:  Continuous Infusions:   dextrose 5 % and 0.45 % NaCl 100 mL/hr at 10/20/17 1300    insulin (HUMAN R) infusion (adults) 6.5 Units/hr (10/20/17 1300)    propofol 35 mcg/kg/min (10/20/17 1300)     Scheduled Meds:   famotidine (PF)  20 mg Intravenous Q12H    fluconazole (DIFLUCAN) IVPB  200 mg Intravenous Q24H    furosemide  160 mg Intravenous Once    heparin (porcine)  5,000 Units Subcutaneous Q8H    magnesium sulfate in water  2 g Intravenous Once    meropenem (MERREM) IVPB  1 g Intravenous Q8H    [START ON 10/21/2017] methylPREDNISolone sodium succinate  100 mg Intravenous Q12H    Followed by    [START ON 10/22/2017] methylPREDNISolone sodium succinate  80 mg Intravenous Q12H    Followed by    [START ON 10/23/2017] methylPREDNISolone sodium succinate  60 mg Intravenous Q12H    Followed by    [START ON 10/24/2017] methylPREDNISolone sodium succinate  40 mg Intravenous Q12H    Followed by    [START ON 10/25/2017] methylPREDNISolone sodium succinate  20 mg Intravenous Q12H    Followed by    [START ON 10/26/2017] predniSONE  20 mg Oral Daily    mupirocin  1 g Nasal BID    mycophenolate mofetil  1,000 mg Oral  BID    phytonadione ((AQUA-MEPHYTON) IVPB  10 mg Intravenous Q8H    tacrolimus  1 mg Oral BID    valganciclovir 50 mg/ml  450 mg Per NG tube Daily     PRN Meds:dextrose 50%, dextrose 50%, dextrose 50%, dextrose 50%, morphine     Objective:     Vital Signs (Most Recent):  Temp: 97.9 °F (36.6 °C) (10/20/17 1100)  Pulse: 93 (10/20/17 1300)  Resp: 12 (10/20/17 0349)  BP: 128/68 (10/20/17 1202)  SpO2: 100 % (10/20/17 1300) Vital Signs (24h Range):  Temp:  [97.7 °F (36.5 °C)-99.3 °F (37.4 °C)] 97.9 °F (36.6 °C)  Pulse:  [] 93  Resp:  [12] 12  SpO2:  [97 %-100 %] 100 %  BP: (119-130)/(58-71) 128/68  Arterial Line BP: (106-143)/(48-71) 143/68       Intake/Output Summary (Last 24 hours) at 10/20/17 1323  Last data filed at 10/20/17 1300   Gross per 24 hour   Intake            76387 ml   Output            42122 ml   Net             1080 ml       Neuro:  GCS: 10 (e3,v1t,m6)  Sedation:  yes     Restraints:  yes  Neurologic: Mental status: follows commands    Cardiac:   PAP: (15-43)/(14-25) 28/19  PAP (Mean):  [15 mmHg-32 mmHg] 23 mmHg  PCWP:  [14 mmHg-25 mmHg] 19 mmHg  CO:  [10 L/min-13.6 L/min] 10.4 L/min  CI:  [4.8 L/min/m2-6.6 L/min/m2] 5 L/min/m2  Temp:  [97.7 °F (36.5 °C)-99.3 °F (37.4 °C)]   Pulse:  []   Resp:  [12]   BP: (119-130)/(58-71)   SpO2:  [97 %-100 %]   Arterial Line BP: (106-143)/(48-71)   Heart: regular rate and rhythm, S1, S2 normal, no murmur, rub or gallop  Pulses: 2+ and symmetric    Pulmonary:  Vent Mode: SIMV  Oxygen Concentration (%):  [] 60  Resp Rate Total:  [12 br/min-24 br/min] 13 br/min  Vt Set:  [500 mL] 500 mL  PEEP/CPAP:  [5 cmH20-10 cmH20] 10 cmH20  Pressure Support:  [10 cmH20] 10 cmH20  Mean Airway Pressure:  [9.6 daI26-64 cmH20] 13 cmH20       Recent Labs  Lab 10/20/17  1013   PH 7.425   PO2 86   PCO2 35.7   HCO3 23.4*   BE -1       Lungs:  mechanical breath sounds  Chest X-ray: pulm edema    Renal:   I & O (Last 24H):  Intake/Output Summary (Last 24 hours) at 10/20/17  1323  Last data filed at 10/20/17 1300   Gross per 24 hour   Intake            34969 ml   Output            58433 ml   Net             1080 ml       Recent Labs  Lab 10/19/17  0519  10/20/17  0100 10/20/17  0347 10/20/17  0715   BUN 82*  82*  --   --  34* 37*  37*   CREATININE 2.3*  2.3*  --   --  1.1 1.2  1.2   K 3.4*  3.4*  < > 3.5 3.4* 3.3*  3.3*  3.3*   < > = values in this interval not displayed.    Recent Labs  Lab 10/19/17  0519  10/20/17  0100 10/20/17  0347 10/20/17  0715   *  128*  < > 135* 135* 136  136   K 3.4*  3.4*  < > 3.5 3.4* 3.3*  3.3*  3.3*   CL 97  97  --   --  103 103  103   CO2 16*  16*  --   --  20* 20*  20*   CALCIUM 8.7  8.7  --   --  8.0* 8.1*  8.1*   *  120*  < > 151* 172* 217*  217*   < > = values in this interval not displayed.    Recent Labs  Lab 10/18/17  1357  10/18/17  2301  10/19/17  0519  10/19/17  2244 10/20/17  0000 10/20/17  0100   PHOS 3.6  --  3.8  --  3.6  3.6  --   --   --   --    MG  --   < >  --   < > 2.1  < > 1.6 1.6 1.6   < > = values in this interval not displayed.    FEN/GI    Recent Labs  Lab 10/19/17  0519  10/19/17  2244  10/20/17  0100 10/20/17  0347 10/20/17  0715 10/20/17  1152   BILITOT 31.0*  --   --   --   --  15.7* 17.5*  --    *  --  265*  --   --  351* 335*  335* 277*   ALT 77*  --  97*  --   --  122* 135*  --    ALKPHOS 130  --   --   --   --  62 65  --    ALBUMIN 2.8*  2.8*  < > 2.0*  < > 2.2* 1.9* 2.0*  --    < > = values in this interval not displayed.    Recent Labs  Lab 10/20/17  0715 10/20/17  0819 10/20/17  1152   INR 1.4* 1.4* 1.2      Nutrition: NPO  Abdomen: soft, non-tender non-distented; bowel sounds normal; no masses,  no organomegaly and chevron with minimal drsg soakthrough, serosang drain output  GI ulcer Prophylaxis: yes    HEME:     Recent Labs  Lab 10/20/17  0347 10/20/17  0348 10/20/17  0715 10/20/17  1152   WBC 16.32*  --  14.51* 14.08*   HGB 9.9*  --  10.6* 10.9*   HCT 28.0* 28* 29.7* 30.6*    *  --  131* 148*     DVT Prophylaxis: Pharmacologic yes  Mechanical: yes     Infectious Disease:  Temp:  [97.7 °F (36.5 °C)-99.3 °F (37.4 °C)]   Pulse:  []   Resp:  [12]   BP: (119-130)/(58-71)   SpO2:  [97 %-100 %]   Arterial Line BP: (106-143)/(48-71)   Antibiotics     Start     Stop Route Frequency Ordered    10/20/17 0900  mupirocin 2 % ointment 1 g      10/25 0859 Nasl 2 times daily 10/20/17 0346    10/19/17 2100  meropenem-0.9% sodium chloride 1 g/50 mL IVPB      -- IV Every 8 hours (non-standard times) 10/19/17 2012            Significant Diagnostics:  US tomorrow    Assessment/Plan:   Jhonny Diana is a 27 y.o. male s/p OLTx for acute alcoholic hepatitis.    Neuro: Post-operative pain  - Continue current pain control regimen  - wean sedation for SBT    Resp:  - intubated until diruesis  - daily SBT  - Minimize supplemental O2 requirements  - Encourage IS, deep breathing, cough    CV:  - HDS  - No current vasopressor requirement    Heme: Anemia  - Hgb/Hct stable - 9.9/28  - Continue to trend    ID:  - on meropenem from Kleb in sputum 1/6, check G6PD for Bactrim allergy (SJS hx?) (dapsone would be approp,if not will start atorvaquine), diflucan  - note: there were a few serosal tears due to dense adhesions, which were over sewn     Immuno: Immunocompromised state  - Continue current immunosuppression regimen  - Daily Prograf level  - Continue opportunistic infection prophylaxis (discussed no Bactrim)    Renal:  - Garcia in place  - Strict I/Os  - Nephro consulted, lasix trial and if no UOP will start CRRT    FEN/GI: ESLD s/p OLT, transaminitis, protein calorie malnutrition  - Liver U/S tomorrow  - Abdominal drains to remain in place to bulb suction  - Monitor abdominal drain output and character  - Trend LFTs  - Replace lytes PRN    Endo: Hyperglycemia  - Endocrine following, appreciate assistance  - Accuchecks  - Insulin gtt    Dispo:  - Continue ICU care

## 2017-10-20 NOTE — TRANSFER OF CARE
"Anesthesia Transfer of Care Note    Patient: Jhonny Diana    Procedure(s) Performed: Procedure(s) (LRB):  TRANSPLANT-LIVER in OR 2:30p, cut 4p (N/A)    Patient location: ICU    Anesthesia Type: general    Transport from OR: Transported from OR intubated on 100% O2 by AMBU with adequate controlled ventilation. Upon arrival to PACU/ICU, patient attached to ventilator and auscultated to confirm bilateral breath sounds and adequate TV. Continuous ECG monitoring in transport. Continuous SpO2 monitoring in transport. Continuos invasive BP monitoring in transport    Post pain: adequate analgesia    Post assessment: no apparent anesthetic complications    Post vital signs: stable    Level of consciousness: sedated    Nausea/Vomiting: no nausea/vomiting    Complications: none    Transfer of care protocol was followed      Last vitals:   Visit Vitals  /43   Pulse 104   Temp 36.9 °C (98.4 °F) (Oral)   Resp 18   Ht 5' 7" (1.702 m)   Wt 96.6 kg (212 lb 15.4 oz)   SpO2 100   BMI 33.35 kg/m²     "

## 2017-10-20 NOTE — SUBJECTIVE & OBJECTIVE
Intubated without pressor support. Bg above goal. Received  in the OR.     PMH, PSH, FH, SH updated and reviewed     Review of Systems  Unable to obtain due to: intubation.    Current Medications and/or Treatments Impacting Glycemic Control  Immunotherapy:    Immunosuppressants         Stop Route Frequency     mycophenolate mofetil 200 mg/mL suspension 1,000 mg      -- Oral 2 times daily     tacrolimus (PROGRAF) 1 mg/mL oral syringe      -- Oral 2 times daily        Steroids:   Hormones     Start     Stop Route Frequency Ordered    10/26/17 0900  predniSONE tablet 20 mg  (methylprednisolone taper panel)      -- Oral Daily 10/20/17 0346    10/25/17 0900  methylPREDNISolone sodium succinate injection 20 mg  (methylprednisolone taper panel)      10/26 0859 IV Every 12 hours 10/20/17 0346    10/24/17 0900  methylPREDNISolone sodium succinate injection 40 mg  (methylprednisolone taper panel)      10/25 0859 IV Every 12 hours 10/20/17 0346    10/23/17 0900  methylPREDNISolone sodium succinate injection 60 mg  (methylprednisolone taper panel)      10/24 0859 IV Every 12 hours 10/20/17 0346    10/22/17 0900  methylPREDNISolone sodium succinate injection 80 mg  (methylprednisolone taper panel)      10/23 0859 IV Every 12 hours 10/20/17 0346    10/21/17 0900  methylPREDNISolone sodium succinate injection 100 mg  (methylprednisolone taper panel)      10/22 0859 IV Every 12 hours 10/20/17 0346        Pressors:    Autonomic Drugs     None        Hyperglycemia/Diabetes Medications:   Antihyperglycemics     Start     Stop Route Frequency Ordered    10/20/17 0615  insulin regular (Humulin R) 100 Units in sodium chloride 0.9% 100 mL infusion      -- IV Continuous 10/20/17 0505        PHYSICAL EXAMINATION:Vitals:    10/20/17 1202   BP: 128/68   Pulse: 97   Resp:    Temp:      Body mass index is 33.35 kg/m².    Physical Exam   Constitutional:  Well developed, well nourished, NAD.  ENT: External ears no masses with nose patent;  normal hearing.   Neck:  Supple; trachea midline.   Cardiovascular: Normal heart sounds, + LE edema.     Lungs:  + intubated, lungs anterior bilaterally clear to auscultation.  Abdomen:  Hypoactive bowels, no masses.  MS: No clubbing or cyanosis of nails noted, unable to assess gait.  Skin: No rashes, lesions, or ulcers; no nodules.  Psychiatric: Deferred d/t intubation.   Neurological: Deferred d/t intubation.

## 2017-10-20 NOTE — CONSULTS
[facility]  Endocrinology  Diabetes Consult Note    Consult Requested by: Solis Chapa MD   Reason for admit: Liver replaced by transplant    HISTORY OF PRESENT ILLNESS:  Reason for Consult: Management of Hyperglycemia     Surgical Procedure and Date: OLT 10/20/2017    HPI:   Patient is a 27 y.o. 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 Required intra-op CRRT. No personal or family h/o DM.     Intubated without pressor support. Bg above goal. Received  in the OR.     PMH, PSH, FH, SH updated and reviewed     Review of Systems  Unable to obtain due to: intubation.    Current Medications and/or Treatments Impacting Glycemic Control  Immunotherapy:    Immunosuppressants         Stop Route Frequency     mycophenolate mofetil 200 mg/mL suspension 1,000 mg      -- Oral 2 times daily     tacrolimus (PROGRAF) 1 mg/mL oral syringe      -- Oral 2 times daily        Steroids:   Hormones     Start     Stop Route Frequency Ordered    10/26/17 0900  predniSONE tablet 20 mg  (methylprednisolone taper panel)      -- Oral Daily 10/20/17 0346    10/25/17 0900  methylPREDNISolone sodium succinate injection 20 mg  (methylprednisolone taper panel)      10/26 0859 IV Every 12 hours 10/20/17 0346    10/24/17 0900  methylPREDNISolone sodium succinate injection 40 mg  (methylprednisolone taper panel)      10/25 0859 IV Every 12 hours 10/20/17 0346    10/23/17 0900  methylPREDNISolone sodium succinate injection 60 mg  (methylprednisolone taper panel)      10/24 0859 IV Every 12 hours 10/20/17 0346    10/22/17 0900  methylPREDNISolone sodium succinate injection 80 mg  (methylprednisolone taper panel)      10/23 0859 IV Every 12 hours 10/20/17 0346    10/21/17 0900  methylPREDNISolone sodium succinate injection 100 mg  (methylprednisolone taper panel)      10/22 0859 IV Every 12 hours 10/20/17 0346        Pressors:    Autonomic Drugs     None        Hyperglycemia/Diabetes  Medications:   Antihyperglycemics     Start     Stop Route Frequency Ordered    10/20/17 0615  insulin regular (Humulin R) 100 Units in sodium chloride 0.9% 100 mL infusion      -- IV Continuous 10/20/17 0505        PHYSICAL EXAMINATION:Vitals:    10/20/17 1202   BP: 128/68   Pulse: 97   Resp:    Temp:      Body mass index is 33.35 kg/m².    Physical Exam   Constitutional:  Well developed, well nourished, NAD.  ENT: External ears no masses with nose patent; normal hearing.   Neck:  Supple; trachea midline.   Cardiovascular: Normal heart sounds, + LE edema.     Lungs:  + intubated, lungs anterior bilaterally clear to auscultation.  Abdomen:  Hypoactive bowels, no masses.  MS: No clubbing or cyanosis of nails noted, unable to assess gait.  Skin: No rashes, lesions, or ulcers; no nodules.  Psychiatric: Deferred d/t intubation.   Neurological: Deferred d/t intubation.       Labs Reviewed and Include     Recent Labs  Lab 10/20/17  0715   *  217*   CALCIUM 8.1*  8.1*   ALBUMIN 2.0*   PROT 3.8*     136   K 3.3*  3.3*  3.3*   CO2 20*  20*     103   BUN 37*  37*   CREATININE 1.2  1.2   ALKPHOS 65   *   *  335*   BILITOT 17.5*     Lab Results   Component Value Date    WBC 14.51 (H) 10/20/2017    HGB 10.6 (L) 10/20/2017    HCT 29.7 (L) 10/20/2017    MCV 87 10/20/2017     (L) 10/20/2017     No results for input(s): TSH, FREET4 in the last 168 hours.  Lab Results   Component Value Date    HGBA1C <4.0 (A) 10/18/2017       Nutritional status:   Body mass index is 33.35 kg/m².  Lab Results   Component Value Date    ALBUMIN 2.0 (L) 10/20/2017    ALBUMIN 1.9 (L) 10/20/2017    ALBUMIN 2.2 (L) 10/20/2017     Lab Results   Component Value Date    PREALBUMIN 5 (L) 10/11/2017       Estimated Creatinine Clearance: 101.5 mL/min (based on SCr of 1.2 mg/dL).    Accu-Checks  Recent Labs      10/20/17   6346  10/20/17   0504  10/20/17   0604   POCTGLUCOSE  190*  227*  217*        ASSESSMENT and  PLAN    Hyperglycemia    BG goal 140-180. Continue intensive insulin drip with BG q1h.           Liver transplanted    Avoid hypoglycemia.           Immunosuppression    On immunosuppression.   Will begin standard steroid taper 10/21/17.  Increases insulin needs.         Obesity due to excess calories    Increases insulin resistance.  Body mass index is 33.35 kg/m².          MARCI Mckeon  Endocrinology  [facility]

## 2017-10-20 NOTE — OP NOTE
Certification of Assistant at Surgery       Surgery Date: 10/19/2017     Participating Surgeons:  Surgeon(s) and Role:     * Imer Badillo MD - Assisting     * David Martins MD - Primary    Procedures:  Procedure(s) (LRB):  TRANSPLANT-LIVER in OR 2:30p, cut 4p (N/A)    Assistant Surgeon's Certification of Necessity:  I understand that section 1842 (b) (6) (d) of the Social Security Act generally prohibits Medicare Part B reasonable charge payment for the services of assistants at surgery in teaching hospitals when qualified residents are available to furnish such services. I certify that the services for which payment is claimed were medically necessary, and that no qualified resident was available to perform the services. I further understand that these services are subject to post-payment review by the Medicare carrier.      Imer Badillo MD    10/20/2017  2:02 AM

## 2017-10-20 NOTE — PROGRESS NOTES
TRANSPLANT NOTE:    Admit Date: 2017    ORGAN:   LIVER  Disease Etiology: Acute Alcoholic Hepatitis  Donor Type:    - Brain Death  [facility] High Risk:   No  Donor CMV Status:    Donor HBcAB:   Negative  Donor HCV Status:   Negative  Whole or Partial: Whole Liver  Biliary Anastomosis: End to End  Arterial Anatomy: Mary Ellen    Jhonny Diana is a 27 y.o. male s/p     - Brain Death liver transplant on 10/19/2017 (Liver) for Acute Alcoholic Hepatitis.  This patient will follow the Steroid Induction protocol.  This patients immunosuppression will include a steroid taper over 6 weeks, Cellcept for 3 months and Prograf maintenance.  Opportunistic infection prophylaxis will include Valcyte for 3 months (CMV D+ R+), dapsone/atovaquone for 6 months, and nystatin.  I have reviewed the pre-op medications and those have been restarted those, as appropriate.

## 2017-10-20 NOTE — PT/OT/SLP PROGRESS
Physical Therapy      Jhonny Lebron  MRN: [id number]    Patient not seen today secondary to  (pt on hold 2nd to being intubated and sedated. ). Will follow-up at a later date..    Tammie Whitmore, PT

## 2017-10-20 NOTE — OP NOTE
Operative Report    Date of Procedure: 10/20/2017    Surgeon: Imer Badillo MD  First Assistant: none    Pre-operative Diagnosis: Allograft liver for transplantation  Post-operative Diagnosis: Same    Procedure(s) Performed:   1. Back Table Preparation of Liver with needle biopsy.    Anesthesia: Not applicable  Estimated Blood Loss: Not applicable  Fluids Administered: Not applicable    Findings: Estimated steatosis 0-10%, .  Drains: Not applicable  Specimens: Core biopsy of allograft liver      Preamble  Indications: This report describes only the backbench preparation of the liver prior to transplantation.  The transplant operation itself is described in a separate report.    ABO Confirmation: Immediately following arrival of the donor organ and prior to implantation, a formal ABO confirmation was done according to hospital and [employer] policies.  I confirmed the [employer] ID number of the donor organ and the donor and recipient ABO types, directly verifying these data by comparison with the [employer] [facility] report.  This confirmation was personally done by an attending surgeon and circulating nurse, and is officially documented elsewhere.    Time-Out: A complete time out was carried out prior to the procedure, with confirmation of patient identity, correct procedure, correct operative site, appropriate antibiotic prophylaxis, review of any known allergies, and presence of all needed equipment.    Procedure in Detail  The liver was recovered from the transport cooler and inspected for vascular anatomy and overall suitability for transplantation, with findings as noted above.  The remnant of the diaphragm was dissected away.  The phrenic veins and adrenal vein were identified and ligated or sutured as appropriate.  The portal vein and hepatic artery were mobilized toward the hilum of the liver, and extrahepatic branches were ligated.  No vascular reconstruction was required.  The vessels were tested for leaks.  A needle  biopsy was obtained for permanent section histology using a spring-loaded biopsy device. The liver was kept in ice temperature organ preservation solution until the time of implantation.

## 2017-10-20 NOTE — PLAN OF CARE
Problem: Patient Care Overview  Goal: Plan of Care Review  Dx: Liver transplant   Hx: alcoholic cirrhosis (1/5 of alcohol/ day), hepatic encephalopathy, portal HTN, HTN, LAURA, former smoker, Miller Trell's syndrome, paracentesis x3, ascites, hematoemesis with esophageal varices    10/20: OR: 6 PRBC, 5 FFP, 1 cryo, 1 platelets, 6L isolyte, 1375 cell saver, 1500 ascites, admitted to SICU       Goal: systolic 100-160  accuchecks q1  CBC, AST, PTT, INR q4  Outcome: Ongoing (interventions implemented as appropriate)  Patient on SIMV, 70%, 5 peep, sats= 98%. Remains on D5 1/2 NS maint gtt and insulin. Now waking up and following commands. Urinary output approx 50cc/hr. CVP flat = 8. BP stable. IRISH output now serosanguineous in color. Accuchecks q1 and labs monitored q4. Mother at bedside updated on plan of care. See chart and doc flow sheets for more details.

## 2017-10-20 NOTE — PLAN OF CARE
Problem: Liver Failure, Acute/Chronic (Adult)  Intervention: Optimize/Manage Nutrition  Recommendations    Recommendation/Intervention: Once extubated, advance diet as tolerated to low sodium diet. If unable to advance diet within 48 hours, recommend enteral nutrition given poor nutrition status prior to tx.     RD to provide post tx nutrition education once pt steps down to TSU. RD following.     Goals: Pt to return to nutritionally adequate diet within 48 hours  Nutrition Goal Status: new  Communication of RD Recs: discussed on rounds

## 2017-10-20 NOTE — PLAN OF CARE
Family updated at 1956 via telephone upon starting of procedure.  Family updated at 2211 of progression of procedure.

## 2017-10-20 NOTE — ANESTHESIA PROCEDURE NOTES
Central Line    Diagnosis: ESLD  Patient location during procedure: done in OR  Procedure start time: 10/19/2017 6:00 PM  Timeout: 10/19/2017 5:55 PM  Procedure end time: 10/19/2017 6:15 PM  Staffing  Anesthesiologist: KENDRA COLBY  Resident/CRNA: LOC EMMANUEL  Performed: resident/CRNA   Anesthesiologist was present at the time of the procedure.  Preanesthetic Checklist  Completed: patient identified, site marked, surgical consent, pre-op evaluation, timeout performed, IV checked, risks and benefits discussed, monitors and equipment checked and anesthesia consent given  Indication  Indication: hemodialysis, vascular access     Anesthesia   general anesthesia    Central Line  Skin Prep: skin prepped with ChloraPrep, skin prep agent completely dried prior to procedure  maximum sterile barriers used during central venous catheter insertion  hand hygiene performed prior to central venous catheter insertion  Location: right femoral,   Catheter type: double lumen  Catheter Size: 12 Fr  Ultrasound: vascular probe with ultrasound  Vessel Caliber: medium, patent  Vascular Doppler:  not done, compressibility normal  Needle advanced into vessel with real time Ultrasound guidance.  Guidewire confirmed in vessel.  Sterile sheath used.   Manometry: Venous cannualation confirmed by visual estimation of blood vessel pressure using manometry.  Insertion Attempts: 1   Securement:line sutured, chlorhexidine patch, sterile dressing applied and blood return through all ports     Post-Procedure  Adverse Events:none

## 2017-10-21 PROBLEM — R73.9 HYPERGLYCEMIA: Status: ACTIVE | Noted: 2017-10-21

## 2017-10-21 LAB
25(OH)D3+25(OH)D2 SERPL-MCNC: 4 NG/ML
ALBUMIN SERPL BCP-MCNC: 1.8 G/DL
ALBUMIN SERPL BCP-MCNC: 1.9 G/DL
ALBUMIN SERPL BCP-MCNC: 2 G/DL
ALLENS TEST: ABNORMAL
ALP SERPL-CCNC: 70 U/L
ALT SERPL W/O P-5'-P-CCNC: 120 U/L
ANION GAP SERPL CALC-SCNC: 12 MMOL/L
APTT BLDCRRT: 31.3 SEC
AST SERPL-CCNC: 178 U/L
AST SERPL-CCNC: 182 U/L
AST SERPL-CCNC: 239 U/L
AST SERPL-CCNC: 249 U/L
BASOPHILS # BLD AUTO: 0.02 K/UL
BASOPHILS # BLD AUTO: 0.02 K/UL
BASOPHILS # BLD AUTO: 0.03 K/UL
BASOPHILS # BLD AUTO: 0.04 K/UL
BASOPHILS NFR BLD: 0.1 %
BASOPHILS NFR BLD: 0.2 %
BILIRUB DIRECT SERPL-MCNC: 7.2 MG/DL
BILIRUB SERPL-MCNC: 9.1 MG/DL
BUN SERPL-MCNC: 19 MG/DL
BUN SERPL-MCNC: 19 MG/DL
BUN SERPL-MCNC: 24 MG/DL
BUN SERPL-MCNC: 43 MG/DL
BUN SERPL-MCNC: 43 MG/DL
CALCIUM SERPL-MCNC: 7.7 MG/DL
CALCIUM SERPL-MCNC: 7.8 MG/DL
CALCIUM SERPL-MCNC: 8 MG/DL
CALCIUM SERPL-MCNC: 8.4 MG/DL
CALCIUM SERPL-MCNC: 8.4 MG/DL
CHLORIDE SERPL-SCNC: 102 MMOL/L
CHLORIDE SERPL-SCNC: 102 MMOL/L
CHLORIDE SERPL-SCNC: 105 MMOL/L
CHLORIDE SERPL-SCNC: 107 MMOL/L
CHLORIDE SERPL-SCNC: 107 MMOL/L
CO2 SERPL-SCNC: 19 MMOL/L
CO2 SERPL-SCNC: 20 MMOL/L
CO2 SERPL-SCNC: 22 MMOL/L
CREAT SERPL-MCNC: 1.3 MG/DL
CREAT SERPL-MCNC: 1.9 MG/DL
CREAT SERPL-MCNC: 2 MG/DL
DELSYS: ABNORMAL
DIFFERENTIAL METHOD: ABNORMAL
EOSINOPHIL # BLD AUTO: 0 K/UL
EOSINOPHIL NFR BLD: 0 %
ERYTHROCYTE [DISTWIDTH] IN BLOOD BY AUTOMATED COUNT: 21.8 %
ERYTHROCYTE [DISTWIDTH] IN BLOOD BY AUTOMATED COUNT: 21.9 %
ERYTHROCYTE [DISTWIDTH] IN BLOOD BY AUTOMATED COUNT: 22.3 %
ERYTHROCYTE [DISTWIDTH] IN BLOOD BY AUTOMATED COUNT: 22.3 %
ERYTHROCYTE [SEDIMENTATION RATE] IN BLOOD BY WESTERGREN METHOD: 12 MM/H
EST. GFR  (AFRICAN AMERICAN): 51 ML/MIN/1.73 M^2
EST. GFR  (AFRICAN AMERICAN): 54.3 ML/MIN/1.73 M^2
EST. GFR  (AFRICAN AMERICAN): >60 ML/MIN/1.73 M^2
EST. GFR  (NON AFRICAN AMERICAN): 44.1 ML/MIN/1.73 M^2
EST. GFR  (NON AFRICAN AMERICAN): 46.9 ML/MIN/1.73 M^2
EST. GFR  (NON AFRICAN AMERICAN): >60 ML/MIN/1.73 M^2
FIO2: 40
FLOW: 60
GGT SERPL-CCNC: 68 U/L
GLUCOSE SERPL-MCNC: 107 MG/DL
GLUCOSE SERPL-MCNC: 110 MG/DL
GLUCOSE SERPL-MCNC: 110 MG/DL
GLUCOSE SERPL-MCNC: 148 MG/DL
GLUCOSE SERPL-MCNC: 151 MG/DL
HCO3 UR-SCNC: 17.9 MMOL/L (ref 24–28)
HCO3 UR-SCNC: 20.6 MMOL/L (ref 24–28)
HCO3 UR-SCNC: 21.1 MMOL/L (ref 24–28)
HCO3 UR-SCNC: 21.8 MMOL/L (ref 24–28)
HCO3 UR-SCNC: 22 MMOL/L (ref 24–28)
HCT VFR BLD AUTO: 28.7 %
HCT VFR BLD AUTO: 29.1 %
HCT VFR BLD AUTO: 29.2 %
HCT VFR BLD AUTO: 30 %
HGB BLD-MCNC: 10.4 G/DL
HGB BLD-MCNC: 10.5 G/DL
HGB BLD-MCNC: 10.6 G/DL
HGB BLD-MCNC: 11.1 G/DL
IMM GRANULOCYTES # BLD AUTO: 0.13 K/UL
IMM GRANULOCYTES # BLD AUTO: 0.15 K/UL
IMM GRANULOCYTES # BLD AUTO: 0.33 K/UL
IMM GRANULOCYTES # BLD AUTO: 0.56 K/UL
IMM GRANULOCYTES NFR BLD AUTO: 0.9 %
IMM GRANULOCYTES NFR BLD AUTO: 1 %
IMM GRANULOCYTES NFR BLD AUTO: 1.4 %
IMM GRANULOCYTES NFR BLD AUTO: 2.2 %
INR PPP: 1.1
LACTATE SERPL-SCNC: 1.3 MMOL/L
LACTATE SERPL-SCNC: 1.5 MMOL/L
LYMPHOCYTES # BLD AUTO: 0.7 K/UL
LYMPHOCYTES # BLD AUTO: 0.7 K/UL
LYMPHOCYTES # BLD AUTO: 0.9 K/UL
LYMPHOCYTES # BLD AUTO: 0.9 K/UL
LYMPHOCYTES NFR BLD: 2.6 %
LYMPHOCYTES NFR BLD: 3 %
LYMPHOCYTES NFR BLD: 5.8 %
LYMPHOCYTES NFR BLD: 6.8 %
MAGNESIUM SERPL-MCNC: 1.9 MG/DL
MAGNESIUM SERPL-MCNC: 2.1 MG/DL
MAGNESIUM SERPL-MCNC: 2.3 MG/DL
MCH RBC QN AUTO: 30.5 PG
MCH RBC QN AUTO: 30.7 PG
MCH RBC QN AUTO: 31.1 PG
MCH RBC QN AUTO: 31.4 PG
MCHC RBC AUTO-ENTMCNC: 35.6 G/DL
MCHC RBC AUTO-ENTMCNC: 36.4 G/DL
MCHC RBC AUTO-ENTMCNC: 36.6 G/DL
MCHC RBC AUTO-ENTMCNC: 37 G/DL
MCV RBC AUTO: 84 FL
MCV RBC AUTO: 84 FL
MCV RBC AUTO: 86 FL
MCV RBC AUTO: 86 FL
MODE: ABNORMAL
MONOCYTES # BLD AUTO: 0.9 K/UL
MONOCYTES # BLD AUTO: 1.2 K/UL
MONOCYTES # BLD AUTO: 1.3 K/UL
MONOCYTES # BLD AUTO: 1.5 K/UL
MONOCYTES NFR BLD: 5.3 %
MONOCYTES NFR BLD: 6.2 %
MONOCYTES NFR BLD: 6.3 %
MONOCYTES NFR BLD: 8.1 %
NEUTROPHILS # BLD AUTO: 11.8 K/UL
NEUTROPHILS # BLD AUTO: 12.7 K/UL
NEUTROPHILS # BLD AUTO: 21.5 K/UL
NEUTROPHILS # BLD AUTO: 22.5 K/UL
NEUTROPHILS NFR BLD: 85 %
NEUTROPHILS NFR BLD: 85.9 %
NEUTROPHILS NFR BLD: 89.3 %
NEUTROPHILS NFR BLD: 89.7 %
NRBC BLD-RTO: 0 /100 WBC
PCO2 BLDA: 31.5 MMHG (ref 35–45)
PCO2 BLDA: 31.6 MMHG (ref 35–45)
PCO2 BLDA: 32.5 MMHG (ref 35–45)
PCO2 BLDA: 33 MMHG (ref 35–45)
PCO2 BLDA: 36.3 MMHG (ref 35–45)
PEEP: 5
PH SMN: 7.36 [PH] (ref 7.35–7.45)
PH SMN: 7.36 [PH] (ref 7.35–7.45)
PH SMN: 7.41 [PH] (ref 7.35–7.45)
PH SMN: 7.43 [PH] (ref 7.35–7.45)
PH SMN: 7.45 [PH] (ref 7.35–7.45)
PHOSPHATE SERPL-MCNC: 3.1 MG/DL
PHOSPHATE SERPL-MCNC: 3.3 MG/DL
PHOSPHATE SERPL-MCNC: 3.3 MG/DL
PHOSPHATE SERPL-MCNC: 4 MG/DL
PIP: 22
PLATELET # BLD AUTO: 113 K/UL
PLATELET # BLD AUTO: 116 K/UL
PLATELET # BLD AUTO: 141 K/UL
PLATELET # BLD AUTO: 141 K/UL
PMV BLD AUTO: 10.6 FL
PMV BLD AUTO: 11 FL
PMV BLD AUTO: 11.4 FL
PMV BLD AUTO: 11.4 FL
PO2 BLDA: 64 MMHG (ref 80–100)
PO2 BLDA: 70 MMHG (ref 80–100)
PO2 BLDA: 83 MMHG (ref 80–100)
PO2 BLDA: 84 MMHG (ref 80–100)
PO2 BLDA: 92 MMHG (ref 80–100)
POC BE: -2 MMOL/L
POC BE: -2 MMOL/L
POC BE: -3 MMOL/L
POC BE: -5 MMOL/L
POC BE: -7 MMOL/L
POC SATURATED O2: 92 % (ref 95–100)
POC SATURATED O2: 95 % (ref 95–100)
POC SATURATED O2: 96 % (ref 95–100)
POC SATURATED O2: 97 % (ref 95–100)
POC SATURATED O2: 97 % (ref 95–100)
POC TCO2: 19 MMOL/L (ref 23–27)
POC TCO2: 22 MMOL/L (ref 23–27)
POC TCO2: 22 MMOL/L (ref 23–27)
POC TCO2: 23 MMOL/L (ref 23–27)
POC TCO2: 23 MMOL/L (ref 23–27)
POCT GLUCOSE: 101 MG/DL (ref 70–110)
POCT GLUCOSE: 111 MG/DL (ref 70–110)
POCT GLUCOSE: 112 MG/DL (ref 70–110)
POCT GLUCOSE: 121 MG/DL (ref 70–110)
POCT GLUCOSE: 121 MG/DL (ref 70–110)
POCT GLUCOSE: 136 MG/DL (ref 70–110)
POCT GLUCOSE: 141 MG/DL (ref 70–110)
POCT GLUCOSE: 141 MG/DL (ref 70–110)
POCT GLUCOSE: 142 MG/DL (ref 70–110)
POCT GLUCOSE: 149 MG/DL (ref 70–110)
POCT GLUCOSE: 149 MG/DL (ref 70–110)
POCT GLUCOSE: 152 MG/DL (ref 70–110)
POCT GLUCOSE: 154 MG/DL (ref 70–110)
POCT GLUCOSE: 157 MG/DL (ref 70–110)
POCT GLUCOSE: 169 MG/DL (ref 70–110)
POCT GLUCOSE: 172 MG/DL (ref 70–110)
POTASSIUM SERPL-SCNC: 3 MMOL/L
POTASSIUM SERPL-SCNC: 3.4 MMOL/L
POTASSIUM SERPL-SCNC: 3.5 MMOL/L
POTASSIUM SERPL-SCNC: 3.7 MMOL/L
POTASSIUM SERPL-SCNC: 3.7 MMOL/L
PROT SERPL-MCNC: 4 G/DL
PROTHROMBIN TIME: 11.2 SEC
PROTHROMBIN TIME: 11.4 SEC
PROTHROMBIN TIME: 11.9 SEC
PS: 10
RBC # BLD AUTO: 3.38 M/UL
RBC # BLD AUTO: 3.41 M/UL
RBC # BLD AUTO: 3.42 M/UL
RBC # BLD AUTO: 3.57 M/UL
SAMPLE: ABNORMAL
SITE: ABNORMAL
SODIUM SERPL-SCNC: 133 MMOL/L
SODIUM SERPL-SCNC: 134 MMOL/L
SODIUM SERPL-SCNC: 138 MMOL/L
SODIUM SERPL-SCNC: 138 MMOL/L
SODIUM SERPL-SCNC: 139 MMOL/L
SP02: 97
TACROLIMUS BLD-MCNC: 5.3 NG/ML
VT: 500
WBC # BLD AUTO: 13.71 K/UL
WBC # BLD AUTO: 14.9 K/UL
WBC # BLD AUTO: 24.11 K/UL
WBC # BLD AUTO: 25.1 K/UL

## 2017-10-21 PROCEDURE — 85610 PROTHROMBIN TIME: CPT

## 2017-10-21 PROCEDURE — 82977 ASSAY OF GGT: CPT

## 2017-10-21 PROCEDURE — 83605 ASSAY OF LACTIC ACID: CPT

## 2017-10-21 PROCEDURE — 94150 VITAL CAPACITY TEST: CPT

## 2017-10-21 PROCEDURE — 27000221 HC OXYGEN, UP TO 24 HOURS

## 2017-10-21 PROCEDURE — 63600175 PHARM REV CODE 636 W HCPCS: Performed by: STUDENT IN AN ORGANIZED HEALTH CARE EDUCATION/TRAINING PROGRAM

## 2017-10-21 PROCEDURE — 80053 COMPREHEN METABOLIC PANEL: CPT

## 2017-10-21 PROCEDURE — 83735 ASSAY OF MAGNESIUM: CPT

## 2017-10-21 PROCEDURE — 90945 DIALYSIS ONE EVALUATION: CPT

## 2017-10-21 PROCEDURE — 94003 VENT MGMT INPAT SUBQ DAY: CPT

## 2017-10-21 PROCEDURE — 80197 ASSAY OF TACROLIMUS: CPT

## 2017-10-21 PROCEDURE — 83735 ASSAY OF MAGNESIUM: CPT | Mod: 91

## 2017-10-21 PROCEDURE — 82248 BILIRUBIN DIRECT: CPT

## 2017-10-21 PROCEDURE — 63600175 PHARM REV CODE 636 W HCPCS: Performed by: TRANSPLANT SURGERY

## 2017-10-21 PROCEDURE — 25000003 PHARM REV CODE 250: Performed by: INTERNAL MEDICINE

## 2017-10-21 PROCEDURE — 84450 TRANSFERASE (AST) (SGOT): CPT

## 2017-10-21 PROCEDURE — 80069 RENAL FUNCTION PANEL: CPT | Mod: 91

## 2017-10-21 PROCEDURE — 37799 UNLISTED PX VASCULAR SURGERY: CPT

## 2017-10-21 PROCEDURE — 25000003 PHARM REV CODE 250: Performed by: TRANSPLANT SURGERY

## 2017-10-21 PROCEDURE — 63600175 PHARM REV CODE 636 W HCPCS: Performed by: HOSPITALIST

## 2017-10-21 PROCEDURE — 20000000 HC ICU ROOM

## 2017-10-21 PROCEDURE — 63600175 PHARM REV CODE 636 W HCPCS: Performed by: ANESTHESIOLOGY

## 2017-10-21 PROCEDURE — 99232 SBSQ HOSP IP/OBS MODERATE 35: CPT | Mod: ,,, | Performed by: INTERNAL MEDICINE

## 2017-10-21 PROCEDURE — 99900017 HC EXTUBATION W/PARAMETERS (STAT)

## 2017-10-21 PROCEDURE — 85025 COMPLETE CBC W/AUTO DIFF WBC: CPT | Mod: 91

## 2017-10-21 PROCEDURE — 82803 BLOOD GASES ANY COMBINATION: CPT

## 2017-10-21 PROCEDURE — 27100171 HC OXYGEN HIGH FLOW UP TO 24 HOURS

## 2017-10-21 PROCEDURE — 25000003 PHARM REV CODE 250: Performed by: HOSPITALIST

## 2017-10-21 PROCEDURE — 94761 N-INVAS EAR/PLS OXIMETRY MLT: CPT

## 2017-10-21 PROCEDURE — 82960 TEST FOR G6PD ENZYME: CPT

## 2017-10-21 PROCEDURE — S0028 INJECTION, FAMOTIDINE, 20 MG: HCPCS | Performed by: TRANSPLANT SURGERY

## 2017-10-21 PROCEDURE — 99900026 HC AIRWAY MAINTENANCE (STAT)

## 2017-10-21 PROCEDURE — 85730 THROMBOPLASTIN TIME PARTIAL: CPT

## 2017-10-21 PROCEDURE — 80069 RENAL FUNCTION PANEL: CPT

## 2017-10-21 PROCEDURE — 99900035 HC TECH TIME PER 15 MIN (STAT)

## 2017-10-21 PROCEDURE — 85610 PROTHROMBIN TIME: CPT | Mod: 91

## 2017-10-21 RX ORDER — DEXMEDETOMIDINE HYDROCHLORIDE 4 UG/ML
INJECTION, SOLUTION INTRAVENOUS
Status: DISCONTINUED
Start: 2017-10-21 | End: 2017-10-22 | Stop reason: WASHOUT

## 2017-10-21 RX ORDER — INSULIN ASPART 100 [IU]/ML
0-5 INJECTION, SOLUTION INTRAVENOUS; SUBCUTANEOUS
Status: DISCONTINUED | OUTPATIENT
Start: 2017-10-21 | End: 2017-10-23

## 2017-10-21 RX ORDER — HYDROCODONE BITARTRATE AND ACETAMINOPHEN 500; 5 MG/1; MG/1
TABLET ORAL
Status: DISCONTINUED | OUTPATIENT
Start: 2017-10-21 | End: 2017-10-22

## 2017-10-21 RX ORDER — GLUCAGON 1 MG
1 KIT INJECTION
Status: DISCONTINUED | OUTPATIENT
Start: 2017-10-21 | End: 2017-10-23

## 2017-10-21 RX ORDER — MAGNESIUM SULFATE HEPTAHYDRATE 40 MG/ML
2 INJECTION, SOLUTION INTRAVENOUS
Status: DISCONTINUED | OUTPATIENT
Start: 2017-10-21 | End: 2017-10-22

## 2017-10-21 RX ORDER — TACROLIMUS 1 MG/1
1 CAPSULE ORAL 2 TIMES DAILY
Status: DISCONTINUED | OUTPATIENT
Start: 2017-10-21 | End: 2017-10-22

## 2017-10-21 RX ADMIN — FAMOTIDINE 20 MG: 10 INJECTION, SOLUTION INTRAVENOUS at 08:10

## 2017-10-21 RX ADMIN — MAGNESIUM SULFATE IN WATER 2 G: 40 INJECTION, SOLUTION INTRAVENOUS at 03:10

## 2017-10-21 RX ADMIN — HEPARIN SODIUM 5000 UNITS: 5000 INJECTION, SOLUTION INTRAVENOUS; SUBCUTANEOUS at 01:10

## 2017-10-21 RX ADMIN — PROPOFOL 50 MCG/KG/MIN: 10 INJECTION, EMULSION INTRAVENOUS at 11:10

## 2017-10-21 RX ADMIN — SODIUM CHLORIDE: 0.9 INJECTION, SOLUTION INTRAVENOUS at 12:10

## 2017-10-21 RX ADMIN — MEROPENEM AND SODIUM CHLORIDE 1 G: 1 INJECTION, SOLUTION INTRAVENOUS at 04:10

## 2017-10-21 RX ADMIN — HEPARIN SODIUM 5000 UNITS: 5000 INJECTION, SOLUTION INTRAVENOUS; SUBCUTANEOUS at 05:10

## 2017-10-21 RX ADMIN — FLUCONAZOLE 200 MG: 2 INJECTION INTRAVENOUS at 04:10

## 2017-10-21 RX ADMIN — Medication 1000 MG: at 10:10

## 2017-10-21 RX ADMIN — SODIUM CHLORIDE 0.5 UNITS/HR: 9 INJECTION, SOLUTION INTRAVENOUS at 12:10

## 2017-10-21 RX ADMIN — PROPOFOL 35 MCG/KG/MIN: 10 INJECTION, EMULSION INTRAVENOUS at 12:10

## 2017-10-21 RX ADMIN — MEROPENEM AND SODIUM CHLORIDE 1 G: 1 INJECTION, SOLUTION INTRAVENOUS at 12:10

## 2017-10-21 RX ADMIN — TACROLIMUS 1 MG: 1 CAPSULE ORAL at 05:10

## 2017-10-21 RX ADMIN — METHYLPREDNISOLONE SODIUM SUCCINATE 100 MG: 125 INJECTION, POWDER, FOR SOLUTION INTRAMUSCULAR; INTRAVENOUS at 08:10

## 2017-10-21 RX ADMIN — MORPHINE 450 MG: 10 SOLUTION ORAL at 07:10

## 2017-10-21 RX ADMIN — SODIUM CHLORIDE: 0.9 INJECTION, SOLUTION INTRAVENOUS at 07:10

## 2017-10-21 RX ADMIN — HEPARIN SODIUM 5000 UNITS: 5000 INJECTION, SOLUTION INTRAVENOUS; SUBCUTANEOUS at 09:10

## 2017-10-21 RX ADMIN — MUPIROCIN 1 G: 20 OINTMENT TOPICAL at 08:10

## 2017-10-21 RX ADMIN — MEROPENEM AND SODIUM CHLORIDE 1 G: 1 INJECTION, SOLUTION INTRAVENOUS at 08:10

## 2017-10-21 RX ADMIN — DEXTROSE MONOHYDRATE, SODIUM CITRATE, AND CITRIC ACID MONOHYDRATE: 2.45; 2.2; .8 INJECTION, SOLUTION INTRAVENOUS at 05:10

## 2017-10-21 RX ADMIN — Medication 1000 MG: at 09:10

## 2017-10-21 RX ADMIN — MORPHINE SULFATE 1 MG: 2 INJECTION, SOLUTION INTRAMUSCULAR; INTRAVENOUS at 06:10

## 2017-10-21 RX ADMIN — SODIUM CHLORIDE: 0.9 INJECTION, SOLUTION INTRAVENOUS at 02:10

## 2017-10-21 RX ADMIN — CALCIUM CHLORIDE 1 G: 100 INJECTION INTRAVENOUS; INTRAVENTRICULAR at 05:10

## 2017-10-21 RX ADMIN — Medication 1 MG: at 07:10

## 2017-10-21 NOTE — PROGRESS NOTES
Received call by RN about possible dialysis. Minimal UOP.   Earlier today our staff had recommended lasix 200mg IV and diuril 1g IV prior to considering dialysis. This had not yet been done. Will place orders now.   Will continue to monitor overnight. Patient currently on minimal vent settings with stable labs. No urgent need for RRT at this time. Will reassess volume status in AM and make further decisions regarding possible RRT at that time.       Moon Carlton, PGY-5  Nephrology Fellow  [facility]  Pager: [phone]

## 2017-10-21 NOTE — SUBJECTIVE & OBJECTIVE
Interval History: JOCELIN had clotting episodes despite Mishra dialyzer. S/p Liver transplant doing well, extubated today. Net Neg 817 ml/24 hrs overnight.     Review of patient's allergies indicates:   Allergen Reactions    Bactrim [sulfamethoxazole-trimethoprim] Other (See Comments)     Mookie Trell Syndrome     Current Facility-Administered Medications   Medication Frequency    0.9%  NaCl infusion (CRRT USE ONLY) PRN    calcium chloride 1 g in dextrose 5 % 100 mL IVPB PRN    dextrose 5 % and 0.45 % NaCl infusion Continuous    dextrose 50% injection 12.5 g PRN    DEXTROSE-SOD CITRATE-CITRIC AC 2.45-2.2 GRAM- 730 MG/100 ML MISC SOLN PRN    famotidine (PF) injection 20 mg Q12H    fluconazole (DIFLUCAN) IVPB 200 mg 100 mL Q24H    glucagon (human recombinant) injection 1 mg PRN    heparin (porcine) injection 5,000 Units Q8H    insulin aspart pen 0-5 Units PRN    magnesium sulfate 2g in water 50mL IVPB (premix) PRN    magnesium sulfate 2g in water 50mL IVPB (premix) PRN    meropenem-0.9% sodium chloride 1 g/50 mL IVPB Q8H    methylPREDNISolone sodium succinate injection 100 mg Q12H    Followed by    [START ON 10/22/2017] methylPREDNISolone sodium succinate injection 80 mg Q12H    Followed by    [START ON 10/23/2017] methylPREDNISolone sodium succinate injection 60 mg Q12H    Followed by    [START ON 10/24/2017] methylPREDNISolone sodium succinate injection 40 mg Q12H    Followed by    [START ON 10/25/2017] methylPREDNISolone sodium succinate injection 20 mg Q12H    Followed by    [START ON 10/26/2017] predniSONE tablet 20 mg Daily    morphine injection 1 mg Q2H PRN    mupirocin 2 % ointment 1 g BID    mycophenolate mofetil 200 mg/mL suspension 1,000 mg BID    propofol (DIPRIVAN) 10 mg/mL infusion Continuous    tacrolimus capsule 1 mg BID    valganciclovir 50 mg/ml oral solution 450 mg Daily       Objective:     Vital Signs (Most Recent):  Temp: 98.4 °F (36.9 °C) (10/21/17 1500)  Pulse: 90  (10/21/17 1634)  Resp: 12 (10/20/17 0349)  BP: 129/72 (10/21/17 1600)  SpO2: 100 % (10/21/17 1634)  O2 Device (Oxygen Therapy): venti mask (10/21/17 1634) Vital Signs (24h Range):  Temp:  [98 °F (36.7 °C)-98.6 °F (37 °C)] 98.4 °F (36.9 °C)  Pulse:  [62-95] 90  SpO2:  [95 %-100 %] 100 %  BP: (107-135)/(67-85) 129/72  Arterial Line BP: (132-152)/(72-82) 142/78     Weight: 89.4 kg (197 lb 1.5 oz) (10/21/17 8887)  Body mass index is 30.87 kg/m².  Body surface area is 2.06 meters squared.    I/O last 3 completed shifts:  In: 85583 [I.V.:14286; Blood:3996; Other:500; NG/GT:30]  Out: 35204 [Urine:1560; Drains:3685; Other:3558; Blood:5500]    Physical Exam   Constitutional: He is oriented to person, place, and time. He appears well-developed and well-nourished. He is easily aroused. No distress. He is sedated and not intubated.   Extubated today   HENT:   Head: Normocephalic and atraumatic.   Eyes: Conjunctivae and EOM are normal. Pupils are equal, round, and reactive to light. Scleral icterus is present.   Neck: Trachea normal and normal range of motion. Neck supple. No JVD present.   Cardiovascular: Normal rate, regular rhythm, S1 normal, S2 normal, normal heart sounds, intact distal pulses and normal pulses.  Exam reveals no gallop and no friction rub.    No murmur heard.  Pulmonary/Chest: Effort normal and breath sounds normal. He is not intubated.   Abdominal: Soft. Bowel sounds are normal. He exhibits distension.   Musculoskeletal: Normal range of motion. He exhibits edema. He exhibits no deformity.   Neurological: He is alert, oriented to person, place, and time and easily aroused.   Skin: Skin is warm and dry. Capillary refill takes less than 2 seconds. He is not diaphoretic.   Psychiatric: He has a normal mood and affect. His behavior is normal.   Vitals reviewed.      Significant Labs:  BMP:   Recent Labs  Lab 10/21/17  1359         CO2 22*   BUN 24*   CREATININE 1.3   CALCIUM 8.0*   MG 1.9      Cardiac Markers: No results for input(s): CKMB, TROPONINT, MYOGLOBIN in the last 168 hours.  CBC:   Recent Labs  Lab 10/21/17  1607   WBC 24.11*   RBC 3.57*   HGB 11.1*   HCT 30.0*   *   MCV 84   MCH 31.1*   MCHC 37.0*     CMP:   Recent Labs  Lab 10/21/17  0500 10/21/17  1359 10/21/17  1607   * 107  --    CALCIUM 7.7* 8.0*  --    ALBUMIN 1.8* 2.0*  --    PROT 4.0*  --   --    * 139  --    K 3.4* 3.0*  --    CO2 20* 22*  --     105  --    BUN 43* 24*  --    CREATININE 1.9* 1.3  --    ALKPHOS 70  --   --    *  --   --    *  --  239*   BILITOT 9.1*  --   --      Coagulation:   Recent Labs  Lab 10/21/17  0500 10/21/17  1607   INR 1.1 1.1   APTT 31.3  --        Recent Labs  Lab 10/18/17  1439   COLORU Martha   SPECGRAV 1.010   PHUR 5.0   PROTEINUA Negative   NITRITE Negative   LEUKOCYTESUR Negative   UROBILINOGEN Negative     All labs within the past 24 hours have been reviewed.     Significant Imaging:  Labs: Reviewed  X-Ray: Reviewed

## 2017-10-21 NOTE — PLAN OF CARE
Problem: Patient Care Overview  Goal: Individualization & Mutuality  Dx: Liver transplant   Hx: alcoholic cirrhosis (1/5 of alcohol/ day), hepatic encephalopathy, portal HTN, HTN, LAURA, former smoker, Miller Trell's syndrome, paracentesis x3, ascites, hematoemesis with esophageal varices    10/20: OR: 6 PRBC, 5 FFP, 1 cryo, 1 platelets, 6L isolyte, 1375 cell saver, 1500 ascites, admitted to SICU; d/c swan  10/21: CRRT; extubated       Goal: systolic 100-160  accuchecks q4  CBC, AST, PTT, INR q12     Outcome: Ongoing (interventions implemented as appropriate)  Pt extubated. Awake and confused. On Ventimask @50%. VSS.  CRRT clotted x2 today. Orders adjusted. Plan of care reviewed with patient and mom. Will continue to monitor

## 2017-10-21 NOTE — ASSESSMENT & PLAN NOTE
S/p Liver transplant and HD during surgery    - likely ischemic ATN from decreased renal perfusion (volume shifts from paracentesis, anemia)  - urine sediment with muddy brown casts  - Started on SLED overnight for volume management and metabolic clearance  - UF 350ml/hr will increase 400 ml/hr as he is tolerating  - Will start citrate after discussing with primary team

## 2017-10-21 NOTE — SUBJECTIVE & OBJECTIVE
"Interval HPI:   Overnight events:  Remains intubated. Insulin gtt weaned off at 11pm and BG remain at goal. SM taper.     /85 (BP Location: Left arm, Patient Position: Lying)   Pulse 65   Temp 98 °F (36.7 °C) (Oral)   Resp 12   Ht 5' 7" (1.702 m)   Wt 89.4 kg (197 lb 1.5 oz)   SpO2 99%   BMI 30.87 kg/m²     Labs Reviewed and Include      Recent Labs  Lab 10/21/17  0500   *   CALCIUM 7.7*   ALBUMIN 1.8*   PROT 4.0*   *   K 3.4*   CO2 20*      BUN 43*   CREATININE 1.9*   ALKPHOS 70   *   *   BILITOT 9.1*     Lab Results   Component Value Date    WBC 14.90 (H) 10/21/2017    HGB 10.6 (L) 10/21/2017    HCT 29.1 (L) 10/21/2017    MCV 86 10/21/2017     (L) 10/21/2017     No results for input(s): TSH, FREET4 in the last 168 hours.  Lab Results   Component Value Date    HGBA1C <4.0 (A) 10/18/2017       Nutritional status:   Body mass index is 30.87 kg/m².  Lab Results   Component Value Date    ALBUMIN 1.8 (L) 10/21/2017    ALBUMIN 1.9 (L) 10/21/2017    ALBUMIN 2.1 (L) 10/20/2017     Lab Results   Component Value Date    PREALBUMIN 5 (L) 10/11/2017       Estimated Creatinine Clearance: 61.7 mL/min (based on SCr of 1.9 mg/dL (H)).    Accu-Checks  Recent Labs      10/20/17   1158  10/20/17   1258  10/20/17   1407  10/20/17   1517  10/20/17   1550  10/20/17   1716  10/20/17   1809  10/21/17   0412  10/21/17   0457  10/21/17   0722   POCTGLUCOSE  235*  234*  198*  200*  177*  149*  125*  172*  169*  142*       Current Medications and/or Treatments Impacting Glycemic Control  Immunotherapy:  Immunosuppressants         Stop Route Frequency     mycophenolate mofetil 200 mg/mL suspension 1,000 mg      -- Oral 2 times daily     tacrolimus (PROGRAF) 1 mg/mL oral syringe      -- Oral 2 times daily        Steroids:   Hormones     Start     Stop Route Frequency Ordered    10/26/17 0900  predniSONE tablet 20 mg  (methylprednisolone taper panel)      -- Oral Daily 10/20/17 0346    " 10/25/17 0900  methylPREDNISolone sodium succinate injection 20 mg  (methylprednisolone taper panel)      10/26 0859 IV Every 12 hours 10/20/17 0346    10/24/17 0900  methylPREDNISolone sodium succinate injection 40 mg  (methylprednisolone taper panel)      10/25 0859 IV Every 12 hours 10/20/17 0346    10/23/17 0900  methylPREDNISolone sodium succinate injection 60 mg  (methylprednisolone taper panel)      10/24 0859 IV Every 12 hours 10/20/17 0346    10/22/17 0900  methylPREDNISolone sodium succinate injection 80 mg  (methylprednisolone taper panel)      10/23 0859 IV Every 12 hours 10/20/17 0346    10/21/17 0900  methylPREDNISolone sodium succinate injection 100 mg  (methylprednisolone taper panel)      10/22 0859 IV Every 12 hours 10/20/17 0346        Pressors:    Autonomic Drugs     None        Hyperglycemia/Diabetes Medications: Antihyperglycemics     Start     Stop Route Frequency Ordered    10/20/17 0615  insulin regular (Humulin R) 100 Units in sodium chloride 0.9% 100 mL infusion      -- IV Continuous 10/20/17 0502

## 2017-10-21 NOTE — ASSESSMENT & PLAN NOTE
BG goal 140-180.   Insulin gtt weaned off and BG at goal, change to q2h checks and restart gtt if BG >180x2. If remains off insulin today, anticipate will change to low correction w q4hr checks tomorrow.

## 2017-10-21 NOTE — PLAN OF CARE
Problem: Patient Care Overview  Goal: Plan of Care Review  Outcome: Ongoing (interventions implemented as appropriate)  Pt on ventilator settings: SIMV 12, FiO2 40%, PEEP 5; sats %. VSS. Following commands. Turned q2h to prevent skin breakdown. Free from fall/injury overnight. Soft wrist restraints remain in place due to pt pulling at lines. Propofol gtt infusing at 50mcg/kg/min to maintain RASS. Pt given 200mg IVP Lasix and 1g Diuril overnight due to low UOP; pt did not respond appropriately. CRRT started during night; tolerating well. JPs with serosanguinous output. Garcia in place; UOP 5-10cc/hr. Plan is to wean O2 support as appropriate. Mother remains at bedside. Plan of care reviewed with pt and mother. All questions and concerns addressed. See flowsheet for full assessment details.

## 2017-10-21 NOTE — PROGRESS NOTES
[facility]  Transplant Surgery  Progress Note      Hospital Day Hospital Day: 25  Post-Op Day 2 Days Post-Op    Admit Diagnosis: ESLD  Procedures: OLTx    ORGAN:   LIVER  Disease Etiology: Acute Alcoholic Hepatitis  Donor Type:    - Brain Death  CDC High Risk:   No  Donor CMV Status:   Donor CMV Status: Positive  Donor HBcAB:   Negative  Donor HCV Status:   Negative  Whole or Partial: Whole Liver  Biliary Anastomosis: End to End  Arterial Anatomy: Standard      Subjective:     Interval History:   No acute events last night. UOP decreased yesterday, Nephrology ordered CRRT after failed lasix trial. CRRT started around 2a. CXR somewhat improved. On propofol but wakes and follows commands. Vent weaned to minimal setting. SBT this morning with good wean parameters but low O2 on ABG, placed back on rate.     Medications:  Continuous Infusions:   dextrose 5 % and 0.45 % NaCl 10 mL/hr at 10/21/17 1000    insulin (HUMAN R) infusion (adults) Stopped (10/20/17 2200)    propofol Stopped (10/21/17 0700)     Scheduled Meds:   famotidine (PF)  20 mg Intravenous Q12H    fluconazole (DIFLUCAN) IVPB  200 mg Intravenous Q24H    heparin (porcine)  5,000 Units Subcutaneous Q8H    meropenem (MERREM) IVPB  1 g Intravenous Q8H    methylPREDNISolone sodium succinate  100 mg Intravenous Q12H    Followed by    [START ON 10/22/2017] methylPREDNISolone sodium succinate  80 mg Intravenous Q12H    Followed by    [START ON 10/23/2017] methylPREDNISolone sodium succinate  60 mg Intravenous Q12H    Followed by    [START ON 10/24/2017] methylPREDNISolone sodium succinate  40 mg Intravenous Q12H    Followed by    [START ON 10/25/2017] methylPREDNISolone sodium succinate  20 mg Intravenous Q12H    Followed by    [START ON 10/26/2017] predniSONE  20 mg Oral Daily    mupirocin  1 g Nasal BID    mycophenolate mofetil  1,000 mg Oral BID    tacrolimus  1 mg Oral BID    valganciclovir 50 mg/ml  450 mg Per NG tube Daily      PRN Meds:sodium chloride 0.9%, dextrose 50%, dextrose 50%, magnesium sulfate IVPB, morphine     Objective:     Vital Signs (Most Recent):  Temp: 98 °F (36.7 °C) (10/21/17 0700)  Pulse: 68 (10/21/17 1028)  Resp: 12 (10/20/17 0349)  BP: 133/85 (10/21/17 0900)  SpO2: 96 % (10/21/17 1028) Vital Signs (24h Range):  Temp:  [97.7 °F (36.5 °C)-98.6 °F (37 °C)] 98 °F (36.7 °C)  Pulse:  [62-97] 68  SpO2:  [95 %-100 %] 96 %  BP: (116-135)/(68-85) 133/85  Arterial Line BP: (134-152)/(68-82) 142/80       Intake/Output Summary (Last 24 hours) at 10/21/17 1123  Last data filed at 10/21/17 1000   Gross per 24 hour   Intake             3195 ml   Output             4707 ml   Net            -1512 ml       Neuro:  GCS: 10 (e3,v1t,m6)  Sedation:  yes     Restraints:  yes  Neurologic: Mental status: follows commands    Cardiac:   PAP: (28)/(19) 28/19  PAP (Mean):  [23 mmHg] 23 mmHg  PCWP:  [19 mmHg] 19 mmHg  CO:  [10.4 L/min] 10.4 L/min  CI:  [5 L/min/m2] 5 L/min/m2  Temp:  [97.7 °F (36.5 °C)-98.6 °F (37 °C)]   Pulse:  [62-97]   BP: (116-135)/(68-85)   SpO2:  [95 %-100 %]   Arterial Line BP: (134-152)/(68-82)   Heart: regular rate and rhythm, S1, S2 normal, no murmur, rub or gallop  Pulses: 2+ and symmetric    Pulmonary:  Vent Mode: Spont  Oxygen Concentration (%):  [40-60] 40  Resp Rate Total:  [11 br/min-32 br/min] 22 br/min  Vt Set:  [500 mL] 500 mL  PEEP/CPAP:  [5 cmH20-10 cmH20] 5 cmH20  Pressure Support:  [5 cmH20-10 cmH20] 5 cmH20  Mean Airway Pressure:  [7.3 aaG78-24 cmH20] 7.3 cmH20       Recent Labs  Lab 10/21/17  1028   PH 7.362   PO2 64*   PCO2 31.6*   HCO3 17.9*   BE -7       Lungs:  mechanical breath sounds  Chest X-ray: pulm edema    Renal:   I & O (Last 24H):    Intake/Output Summary (Last 24 hours) at 10/21/17 1123  Last data filed at 10/21/17 1000   Gross per 24 hour   Intake             3195 ml   Output             4707 ml   Net            -1512 ml       Recent Labs  Lab 10/20/17  2000 10/21/17  0400 10/21/17  0500    BUN 42* 43* 43*   CREATININE 1.6* 2.0* 1.9*   K 3.5 3.5 3.4*       Recent Labs  Lab 10/20/17  2000 10/21/17  0400 10/21/17  0500   * 133* 134*   K 3.5 3.5 3.4*    102 102   CO2 20* 19* 20*   CALCIUM 8.3* 7.8* 7.7*    148* 151*       Recent Labs  Lab 10/19/17  0519  10/20/17  0100 10/20/17  1545 10/20/17  2350 10/21/17  0400 10/21/17  0723   PHOS 3.6  3.6  --   --  3.2  --  4.0  --    MG 2.1  < > 1.6  --  2.1  --  2.3   < > = values in this interval not displayed.    FEN/GI    Recent Labs  Lab 10/20/17  0347 10/20/17  0715  10/20/17  1545 10/20/17  2000 10/20/17  2350 10/21/17  0400 10/21/17  0500   BILITOT 15.7* 17.5*  --   --   --   --   --  9.1*   * 335*  335*  < > 258* 216* 178*  --  182*   * 135*  --   --   --   --   --  120*   ALKPHOS 62 65  --   --   --   --   --  70   ALBUMIN 1.9* 2.0*  --  2.1*  --   --  1.9* 1.8*   < > = values in this interval not displayed.    Recent Labs  Lab 10/20/17  1545 10/20/17  2000 10/21/17  0500   INR 1.2 1.2 1.1      Nutrition: NPO  Abdomen: soft, non-tender non-distented; bowel sounds normal; no masses,  no organomegaly and chevron with minimal drsg soakthrough, serosang drain output  GI ulcer Prophylaxis: yes    HEME:     Recent Labs  Lab 10/20/17  2000 10/20/17  2350 10/21/17  0500   WBC 15.13* 13.71* 14.90*   HGB 11.0* 10.5* 10.6*   HCT 30.0* 28.7* 29.1*   * 141* 113*     DVT Prophylaxis: Pharmacologic yes  Mechanical: yes     Infectious Disease:  Temp:  [97.7 °F (36.5 °C)-98.6 °F (37 °C)]   Pulse:  [62-97]   BP: (116-135)/(68-85)   SpO2:  [95 %-100 %]   Arterial Line BP: (134-152)/(68-82)   Antibiotics     Start     Stop Route Frequency Ordered    10/20/17 0900  mupirocin 2 % ointment 1 g      10/25 0859 Nasl 2 times daily 10/20/17 0346    10/19/17 2100  meropenem-0.9% sodium chloride 1 g/50 mL IVPB      -- IV Every 8 hours (non-standard times) 10/19/17 2012            Significant Diagnostics:  US tomorrow    Assessment/Plan:    Jhonny Diana is a 27 y.o. male s/p OLTx for acute alcoholic hepatitis.    Neuro: Post-operative pain  - Continue current pain control regimen  - wean sedation for SBT again this afternoon    Resp:  - intubated until diruesis  - daily SBT  - Minimize supplemental O2 requirements  - Encourage IS, deep breathing, cough    CV:  - HDS  - No current vasopressor requirement    Heme: Anemia  - Hgb/Hct stable - 10.6/29.1  - Continue to trend    ID:  - on meropenem from Kleb in sputum 1/6, check G6PD for Bactrim allergy (SJS hx?) (dapsone would be approp,if not will start atorvaquine), diflucan  - note: there were a few serosal tears due to dense adhesions, which were over sewn (likely cont alena thru weekend)    Immuno: Immunocompromised state  - Continue current immunosuppression regimen  - Daily Prograf level  - Continue opportunistic infection prophylaxis (discussed no Bactrim)    Renal:  - Garcia in place  - Strict I/Os  - Continue CRRT for fluid removal    FEN/GI: ESLD s/p OLT, transaminitis, protein calorie malnutrition  - Liver U/S today  - Abdominal drains to remain in place to bulb suction  - Monitor abdominal drain output and character  - Trend LFTs  - Replace lytes PRN    Endo: Hyperglycemia  - Endocrine following, appreciate assistance  - Accuchecks  - Insulin gtt weaned off per endocrine    Dispo:  - Continue ICU care, continue CRRT for volume removal and SBT later today

## 2017-10-21 NOTE — PROGRESS NOTES
[facility]  Nephrology  Progress Note    Patient Name: Jhonny Diana  MRN: [id number]  Admission Date: 9/27/2017  Hospital Length of Stay: 24 days  Attending Provider: Solis Chapa MD   Primary Care Physician: Provider Notinsystem  Principal Problem:Liver replaced by transplant    Subjective:     HPI: Jhonny Diana is a 28 yo male with pMHX of childhood asthma and alcohol abuse who was was transferred to [facility] for hepatology evaluation for worsening liver functions.  Information was obtained from family at bedside and review of outside records, given that patient is obtunded.  According to mother at bedside, patient has had multiple admissions to the hospital over the past 6 months for recurrent abdominal pain.  She reports that he has been been seen the ED and has been diagnosed with diverticulitis, UTIs, gastroenteritis, and cholecystitis.  He was never admitted to the hospital and was always discharged.  On 09/06, patient presented to OSH for worsening abdominal pain and distension.  He was diagnosed at that time with ETOH hepatitis.  He was treated with steroids, ceftriaxone for SBP prophylaxis, and diuresed at that time for volume management.  He was discharged home on the 15th, after clearance from GI,a nd was instructed to follow-up with his PCP 1 week after discharge.  He presented to the hospital on 09/25 for worsening abdominal pain and distension with 20 kg weight gain.  He was transferred to [facility] for further care and Nephrology was consulted for LAURA.    On review of outside records, patient Scr on admission was 1.5 (with baseline at 1.0).  According to the family at the bedside, he was not urinating well at the OSH, and not responding to diuretics (spirionolactone/lasix combo).  His mother reports that patient continued to c/o abdominal and inability to urinate, but no chicas was placed until transfer to [facility] yesterday.  Record reviews shows relative hypotension with BPs in the low 90's.  His  mother reports that the nursing staff was administrating his narcotic regimen based on his blood pressures, and at times, medication was held due to hypotension.  Since admission to hospital, his UOP has improved, responding well in Lasix 40 IV (770 ml since admission, 700 ml so far this shift at time of consultation).  Admit labs revealed a SCr of 2.6, this morning improved down to 2.4.  Bilirubin remains elevated, >14.  Abdomen remains with tight distension, unable to perform paracentesis, as no significant fluid pocket found.  Mother reports patient did not have para's done at OSH.    Labs on the morning of 09/27 with bicarb of 15.  ABG with alkalemia and primary respiratory alkalosis and anion gap acidosis.     Interval History: JOCELIN had clotting episodes despite Mishra dialyzer. S/p Liver transplant doing well, extubated today. Net Neg 817 ml/24 hrs overnight.     Review of patient's allergies indicates:   Allergen Reactions    Bactrim [sulfamethoxazole-trimethoprim] Other (See Comments)     Mookie Trell Syndrome     Current Facility-Administered Medications   Medication Frequency    0.9%  NaCl infusion (CRRT USE ONLY) PRN    calcium chloride 1 g in dextrose 5 % 100 mL IVPB PRN    dextrose 5 % and 0.45 % NaCl infusion Continuous    dextrose 50% injection 12.5 g PRN    DEXTROSE-SOD CITRATE-CITRIC AC 2.45-2.2 GRAM- 730 MG/100 ML MISC SOLN PRN    famotidine (PF) injection 20 mg Q12H    fluconazole (DIFLUCAN) IVPB 200 mg 100 mL Q24H    glucagon (human recombinant) injection 1 mg PRN    heparin (porcine) injection 5,000 Units Q8H    insulin aspart pen 0-5 Units PRN    magnesium sulfate 2g in water 50mL IVPB (premix) PRN    magnesium sulfate 2g in water 50mL IVPB (premix) PRN    meropenem-0.9% sodium chloride 1 g/50 mL IVPB Q8H    methylPREDNISolone sodium succinate injection 100 mg Q12H    Followed by    [START ON 10/22/2017] methylPREDNISolone sodium succinate injection 80 mg Q12H    Followed by     [START ON 10/23/2017] methylPREDNISolone sodium succinate injection 60 mg Q12H    Followed by    [START ON 10/24/2017] methylPREDNISolone sodium succinate injection 40 mg Q12H    Followed by    [START ON 10/25/2017] methylPREDNISolone sodium succinate injection 20 mg Q12H    Followed by    [START ON 10/26/2017] predniSONE tablet 20 mg Daily    morphine injection 1 mg Q2H PRN    mupirocin 2 % ointment 1 g BID    mycophenolate mofetil 200 mg/mL suspension 1,000 mg BID    propofol (DIPRIVAN) 10 mg/mL infusion Continuous    tacrolimus capsule 1 mg BID    valganciclovir 50 mg/ml oral solution 450 mg Daily       Objective:     Vital Signs (Most Recent):  Temp: 98.4 °F (36.9 °C) (10/21/17 1500)  Pulse: 90 (10/21/17 1634)  Resp: 12 (10/20/17 0349)  BP: 129/72 (10/21/17 1600)  SpO2: 100 % (10/21/17 1634)  O2 Device (Oxygen Therapy): venti mask (10/21/17 1634) Vital Signs (24h Range):  Temp:  [98 °F (36.7 °C)-98.6 °F (37 °C)] 98.4 °F (36.9 °C)  Pulse:  [62-95] 90  SpO2:  [95 %-100 %] 100 %  BP: (107-135)/(67-85) 129/72  Arterial Line BP: (132-152)/(72-82) 142/78     Weight: 89.4 kg (197 lb 1.5 oz) (10/21/17 0417)  Body mass index is 30.87 kg/m².  Body surface area is 2.06 meters squared.    I/O last 3 completed shifts:  In: 53399 [I.V.:79093; Blood:3996; Other:500; NG/GT:30]  Out: 83636 [Urine:1560; Drains:3685; Other:3558; Blood:5500]    Physical Exam   Constitutional: He is oriented to person, place, and time. He appears well-developed and well-nourished. He is easily aroused. No distress. He is sedated and not intubated.   Extubated today   HENT:   Head: Normocephalic and atraumatic.   Eyes: Conjunctivae and EOM are normal. Pupils are equal, round, and reactive to light. Scleral icterus is present.   Neck: Trachea normal and normal range of motion. Neck supple. No JVD present.   Cardiovascular: Normal rate, regular rhythm, S1 normal, S2 normal, normal heart sounds, intact distal pulses and normal pulses.  Exam  reveals no gallop and no friction rub.    No murmur heard.  Pulmonary/Chest: Effort normal and breath sounds normal. He is not intubated.   Abdominal: Soft. Bowel sounds are normal. He exhibits distension.   Musculoskeletal: Normal range of motion. He exhibits edema. He exhibits no deformity.   Neurological: He is alert, oriented to person, place, and time and easily aroused.   Skin: Skin is warm and dry. Capillary refill takes less than 2 seconds. He is not diaphoretic.   Psychiatric: He has a normal mood and affect. His behavior is normal.   Vitals reviewed.      Significant Labs:  BMP:   Recent Labs  Lab 10/21/17  1359         CO2 22*   BUN 24*   CREATININE 1.3   CALCIUM 8.0*   MG 1.9     Cardiac Markers: No results for input(s): CKMB, TROPONINT, MYOGLOBIN in the last 168 hours.  CBC:   Recent Labs  Lab 10/21/17  1607   WBC 24.11*   RBC 3.57*   HGB 11.1*   HCT 30.0*   *   MCV 84   MCH 31.1*   MCHC 37.0*     CMP:   Recent Labs  Lab 10/21/17  0500 10/21/17  1359 10/21/17  1607   * 107  --    CALCIUM 7.7* 8.0*  --    ALBUMIN 1.8* 2.0*  --    PROT 4.0*  --   --    * 139  --    K 3.4* 3.0*  --    CO2 20* 22*  --     105  --    BUN 43* 24*  --    CREATININE 1.9* 1.3  --    ALKPHOS 70  --   --    *  --   --    *  --  239*   BILITOT 9.1*  --   --      Coagulation:   Recent Labs  Lab 10/21/17  0500 10/21/17  1607   INR 1.1 1.1   APTT 31.3  --        Recent Labs  Lab 10/18/17  1439   COLORU Martha   SPECGRAV 1.010   PHUR 5.0   PROTEINUA Negative   NITRITE Negative   LEUKOCYTESUR Negative   UROBILINOGEN Negative     All labs within the past 24 hours have been reviewed.     Significant Imaging:  Labs: Reviewed  X-Ray: Reviewed    Assessment/Plan:     LAURA (acute kidney injury)    S/p Liver transplant and HD during surgery    - likely ischemic ATN from decreased renal perfusion (volume shifts from paracentesis, anemia)  - urine sediment with muddy brown casts  - Started on  SLED overnight for volume management and metabolic clearance  - UF 350ml/hr will increase 400 ml/hr as he is tolerating  - Will start citrate after discussing with primary team              Thank you for your consult. I will follow-up with patient. Please contact us if you have any additional questions.    Krish Frausto MD  Nephrology  [facility]     I have reviewed and concur with the fellow's history, physical, assessment, and plan. I have personally interviewed and examined the patient at bedside.

## 2017-10-21 NOTE — PROGRESS NOTES
"[facility]  Endocrinology  Progress Note    Admit Date: 9/27/2017     Reason for Consult: Management of Hyperglycemia     Surgical Procedure and Date: OLT 10/20/2017    HPI:   Patient is a 27 y.o. 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 Required intra-op CRRT. No personal or family h/o DM.     Interval HPI:   Overnight events:  Remains intubated. Insulin gtt weaned off at 11pm and BG remain at goal. SM taper.     /85 (BP Location: Left arm, Patient Position: Lying)   Pulse 65   Temp 98 °F (36.7 °C) (Oral)   Resp 12   Ht 5' 7" (1.702 m)   Wt 89.4 kg (197 lb 1.5 oz)   SpO2 99%   BMI 30.87 kg/m²       Labs Reviewed and Include      Recent Labs  Lab 10/21/17  0500   *   CALCIUM 7.7*   ALBUMIN 1.8*   PROT 4.0*   *   K 3.4*   CO2 20*      BUN 43*   CREATININE 1.9*   ALKPHOS 70   *   *   BILITOT 9.1*     Lab Results   Component Value Date    WBC 14.90 (H) 10/21/2017    HGB 10.6 (L) 10/21/2017    HCT 29.1 (L) 10/21/2017    MCV 86 10/21/2017     (L) 10/21/2017     No results for input(s): TSH, FREET4 in the last 168 hours.  Lab Results   Component Value Date    HGBA1C <4.0 (A) 10/18/2017       Nutritional status:   Body mass index is 30.87 kg/m².  Lab Results   Component Value Date    ALBUMIN 1.8 (L) 10/21/2017    ALBUMIN 1.9 (L) 10/21/2017    ALBUMIN 2.1 (L) 10/20/2017     Lab Results   Component Value Date    PREALBUMIN 5 (L) 10/11/2017       Estimated Creatinine Clearance: 61.7 mL/min (based on SCr of 1.9 mg/dL (H)).    Accu-Checks  Recent Labs      10/20/17   1158  10/20/17   1258  10/20/17   1407  10/20/17   1517  10/20/17   1550  10/20/17   1716  10/20/17   1809  10/21/17   0412  10/21/17   0457  10/21/17   0722   POCTGLUCOSE  235*  234*  198*  200*  177*  149*  125*  172*  169*  142*       Current Medications and/or Treatments Impacting Glycemic Control  Immunotherapy:  Immunosuppressants         Stop " Route Frequency     mycophenolate mofetil 200 mg/mL suspension 1,000 mg      -- Oral 2 times daily     tacrolimus (PROGRAF) 1 mg/mL oral syringe      -- Oral 2 times daily        Steroids:   Hormones     Start     Stop Route Frequency Ordered    10/26/17 0900  predniSONE tablet 20 mg  (methylprednisolone taper panel)      -- Oral Daily 10/20/17 0346    10/25/17 0900  methylPREDNISolone sodium succinate injection 20 mg  (methylprednisolone taper panel)      10/26 0859 IV Every 12 hours 10/20/17 0346    10/24/17 0900  methylPREDNISolone sodium succinate injection 40 mg  (methylprednisolone taper panel)      10/25 0859 IV Every 12 hours 10/20/17 0346    10/23/17 0900  methylPREDNISolone sodium succinate injection 60 mg  (methylprednisolone taper panel)      10/24 0859 IV Every 12 hours 10/20/17 0346    10/22/17 0900  methylPREDNISolone sodium succinate injection 80 mg  (methylprednisolone taper panel)      10/23 0859 IV Every 12 hours 10/20/17 0346    10/21/17 0900  methylPREDNISolone sodium succinate injection 100 mg  (methylprednisolone taper panel)      10/22 0859 IV Every 12 hours 10/20/17 0346        Pressors:    Autonomic Drugs     None        Hyperglycemia/Diabetes Medications: Antihyperglycemics     Start     Stop Route Frequency Ordered    10/20/17 0615  insulin regular (Humulin R) 100 Units in sodium chloride 0.9% 100 mL infusion      -- IV Continuous 10/20/17 0505          ASSESSMENT and PLAN    Hyperglycemia    BG goal 140-180.   Insulin gtt weaned off and BG at goal, change to q2h checks and restart gtt if BG >180x2. If remains off insulin today, anticipate will change to low correction w q4hr checks tomorrow.               Liver transplanted     Avoid hypoglycemia.           Immunosuppression     On immunosuppression.   Will begin standard steroid taper 10/21/17.  Increases insulin needs.        Obesity due to excess calories     Increases insulin resistance.  Body mass index is 33.35 kg/m².          Glenis Odell MD  Endocrinology  [facility]

## 2017-10-22 LAB
ALBUMIN SERPL BCP-MCNC: 1.8 G/DL
ALBUMIN SERPL BCP-MCNC: 1.8 G/DL
ALBUMIN SERPL BCP-MCNC: 1.9 G/DL
ALLENS TEST: ABNORMAL
ALP SERPL-CCNC: 85 U/L
ALP SERPL-CCNC: 85 U/L
ALP SERPL-CCNC: 92 U/L
ALT SERPL W/O P-5'-P-CCNC: 108 U/L
ALT SERPL W/O P-5'-P-CCNC: 123 U/L
ALT SERPL W/O P-5'-P-CCNC: 97 U/L
ANION GAP SERPL CALC-SCNC: 10 MMOL/L
ANION GAP SERPL CALC-SCNC: 10 MMOL/L
ANION GAP SERPL CALC-SCNC: 13 MMOL/L
ANION GAP SERPL CALC-SCNC: 14 MMOL/L
ANION GAP SERPL CALC-SCNC: 14 MMOL/L
APTT BLDCRRT: 34.4 SEC
AST SERPL-CCNC: 158 U/L
AST SERPL-CCNC: 158 U/L
AST SERPL-CCNC: 178 U/L
AST SERPL-CCNC: 218 U/L
AST SERPL-CCNC: 229 U/L
BASOPHILS # BLD AUTO: 0.01 K/UL
BASOPHILS # BLD AUTO: 0.01 K/UL
BASOPHILS # BLD AUTO: 0.03 K/UL
BASOPHILS NFR BLD: 0.1 %
BILIRUB DIRECT SERPL-MCNC: 7.4 MG/DL
BILIRUB SERPL-MCNC: 8.7 MG/DL
BILIRUB SERPL-MCNC: 9.1 MG/DL
BILIRUB SERPL-MCNC: 9.2 MG/DL
BLD PROD TYP BPU: NORMAL
BLOOD UNIT EXPIRATION DATE: NORMAL
BLOOD UNIT TYPE CODE: 5100
BLOOD UNIT TYPE: NORMAL
BUN SERPL-MCNC: 14 MG/DL
BUN SERPL-MCNC: 18 MG/DL
BUN SERPL-MCNC: 21 MG/DL
CALCIUM SERPL-MCNC: 10.1 MG/DL
CALCIUM SERPL-MCNC: 10.2 MG/DL
CALCIUM SERPL-MCNC: 10.2 MG/DL
CALCIUM SERPL-MCNC: 9.2 MG/DL
CALCIUM SERPL-MCNC: 9.7 MG/DL
CHLORIDE SERPL-SCNC: 107 MMOL/L
CHLORIDE SERPL-SCNC: 107 MMOL/L
CHLORIDE SERPL-SCNC: 108 MMOL/L
CO2 SERPL-SCNC: 21 MMOL/L
CO2 SERPL-SCNC: 21 MMOL/L
CO2 SERPL-SCNC: 22 MMOL/L
CO2 SERPL-SCNC: 24 MMOL/L
CO2 SERPL-SCNC: 25 MMOL/L
CODING SYSTEM: NORMAL
CREAT SERPL-MCNC: 1.2 MG/DL
CREAT SERPL-MCNC: 1.3 MG/DL
CREAT SERPL-MCNC: 1.3 MG/DL
CREAT SERPL-MCNC: 1.7 MG/DL
CREAT SERPL-MCNC: 1.9 MG/DL
DELSYS: ABNORMAL
DIFFERENTIAL METHOD: ABNORMAL
DISPENSE STATUS: NORMAL
EOSINOPHIL # BLD AUTO: 0 K/UL
EOSINOPHIL NFR BLD: 0 %
EOSINOPHIL NFR BLD: 0 %
EOSINOPHIL NFR BLD: 0.1 %
ERYTHROCYTE [DISTWIDTH] IN BLOOD BY AUTOMATED COUNT: 22.5 %
ERYTHROCYTE [DISTWIDTH] IN BLOOD BY AUTOMATED COUNT: 22.7 %
ERYTHROCYTE [DISTWIDTH] IN BLOOD BY AUTOMATED COUNT: 23 %
EST. GFR  (AFRICAN AMERICAN): 54.3 ML/MIN/1.73 M^2
EST. GFR  (AFRICAN AMERICAN): >60 ML/MIN/1.73 M^2
EST. GFR  (NON AFRICAN AMERICAN): 46.9 ML/MIN/1.73 M^2
EST. GFR  (NON AFRICAN AMERICAN): 53.7 ML/MIN/1.73 M^2
EST. GFR  (NON AFRICAN AMERICAN): >60 ML/MIN/1.73 M^2
FIO2: 55
FIO2: 55
FLOW: 15
FLOW: 15
GGT SERPL-CCNC: 75 U/L
GLUCOSE SERPL-MCNC: 117 MG/DL
GLUCOSE SERPL-MCNC: 118 MG/DL
GLUCOSE SERPL-MCNC: 120 MG/DL
GLUCOSE SERPL-MCNC: 120 MG/DL
GLUCOSE SERPL-MCNC: 127 MG/DL
HCO3 UR-SCNC: 23.7 MMOL/L (ref 24–28)
HCO3 UR-SCNC: 26.6 MMOL/L (ref 24–28)
HCO3 UR-SCNC: 29.6 MMOL/L (ref 24–28)
HCT VFR BLD AUTO: 24.5 %
HCT VFR BLD AUTO: 24.9 %
HCT VFR BLD AUTO: 25.8 %
HGB BLD-MCNC: 8.8 G/DL
HGB BLD-MCNC: 9 G/DL
HGB BLD-MCNC: 9.5 G/DL
IMM GRANULOCYTES # BLD AUTO: 0.24 K/UL
IMM GRANULOCYTES # BLD AUTO: 0.25 K/UL
IMM GRANULOCYTES # BLD AUTO: 0.32 K/UL
IMM GRANULOCYTES NFR BLD AUTO: 1.5 %
IMM GRANULOCYTES NFR BLD AUTO: 1.5 %
IMM GRANULOCYTES NFR BLD AUTO: 1.6 %
INR PPP: 1.1
INR PPP: 1.2
INR PPP: 1.2
LACTATE SERPL-SCNC: 1.5 MMOL/L
LYMPHOCYTES # BLD AUTO: 1 K/UL
LYMPHOCYTES # BLD AUTO: 1 K/UL
LYMPHOCYTES # BLD AUTO: 1.3 K/UL
LYMPHOCYTES NFR BLD: 4.5 %
LYMPHOCYTES NFR BLD: 6.1 %
LYMPHOCYTES NFR BLD: 8.6 %
MAGNESIUM SERPL-MCNC: 2.5 MG/DL
MAGNESIUM SERPL-MCNC: 2.5 MG/DL
MAGNESIUM SERPL-MCNC: 2.7 MG/DL
MAGNESIUM SERPL-MCNC: 2.7 MG/DL
MCH RBC QN AUTO: 30.5 PG
MCH RBC QN AUTO: 31 PG
MCH RBC QN AUTO: 31.1 PG
MCHC RBC AUTO-ENTMCNC: 35.9 G/DL
MCHC RBC AUTO-ENTMCNC: 36.1 G/DL
MCHC RBC AUTO-ENTMCNC: 36.8 G/DL
MCV RBC AUTO: 84 FL
MCV RBC AUTO: 84 FL
MCV RBC AUTO: 87 FL
MODE: ABNORMAL
MONOCYTES # BLD AUTO: 0.8 K/UL
MONOCYTES # BLD AUTO: 1.1 K/UL
MONOCYTES # BLD AUTO: 1.3 K/UL
MONOCYTES NFR BLD: 5.5 %
MONOCYTES NFR BLD: 6.1 %
MONOCYTES NFR BLD: 6.4 %
NEUTROPHILS # BLD AUTO: 12.8 K/UL
NEUTROPHILS # BLD AUTO: 14.1 K/UL
NEUTROPHILS # BLD AUTO: 18.8 K/UL
NEUTROPHILS NFR BLD: 84.1 %
NEUTROPHILS NFR BLD: 85.9 %
NEUTROPHILS NFR BLD: 87.8 %
NRBC BLD-RTO: 0 /100 WBC
NUM UNITS TRANS FFP: NORMAL
PCO2 BLDA: 30.7 MMHG (ref 35–45)
PCO2 BLDA: 36.4 MMHG (ref 35–45)
PCO2 BLDA: 41.3 MMHG (ref 35–45)
PH SMN: 7.46 [PH] (ref 7.35–7.45)
PH SMN: 7.47 [PH] (ref 7.35–7.45)
PH SMN: 7.5 [PH] (ref 7.35–7.45)
PHOSPHATE SERPL-MCNC: 3.2 MG/DL
PHOSPHATE SERPL-MCNC: 3.2 MG/DL
PLATELET # BLD AUTO: 58 K/UL
PLATELET # BLD AUTO: 66 K/UL
PLATELET # BLD AUTO: 66 K/UL
PMV BLD AUTO: 11 FL
PMV BLD AUTO: 11.8 FL
PMV BLD AUTO: 12.2 FL
PO2 BLDA: 121 MMHG (ref 80–100)
PO2 BLDA: 53 MMHG (ref 80–100)
PO2 BLDA: 88 MMHG (ref 80–100)
POC BE: 0 MMOL/L
POC BE: 3 MMOL/L
POC BE: 6 MMOL/L
POC SATURATED O2: 90 % (ref 95–100)
POC SATURATED O2: 97 % (ref 95–100)
POC SATURATED O2: 99 % (ref 95–100)
POC TCO2: 25 MMOL/L (ref 23–27)
POC TCO2: 28 MMOL/L (ref 23–27)
POC TCO2: 31 MMOL/L (ref 23–27)
POCT GLUCOSE: 103 MG/DL (ref 70–110)
POCT GLUCOSE: 119 MG/DL (ref 70–110)
POCT GLUCOSE: 123 MG/DL (ref 70–110)
POCT GLUCOSE: 124 MG/DL (ref 70–110)
POCT GLUCOSE: 131 MG/DL (ref 70–110)
POCT GLUCOSE: 131 MG/DL (ref 70–110)
POCT GLUCOSE: 138 MG/DL (ref 70–110)
POTASSIUM SERPL-SCNC: 4.1 MMOL/L
POTASSIUM SERPL-SCNC: 4.6 MMOL/L
POTASSIUM SERPL-SCNC: 4.6 MMOL/L
PROT SERPL-MCNC: 3.9 G/DL
PROT SERPL-MCNC: 4.1 G/DL
PROT SERPL-MCNC: 4.2 G/DL
PROTHROMBIN TIME: 11.4 SEC
PROTHROMBIN TIME: 12.5 SEC
PROTHROMBIN TIME: 12.5 SEC
RBC # BLD AUTO: 2.83 M/UL
RBC # BLD AUTO: 2.95 M/UL
RBC # BLD AUTO: 3.06 M/UL
SAMPLE: ABNORMAL
SITE: ABNORMAL
SODIUM SERPL-SCNC: 141 MMOL/L
SODIUM SERPL-SCNC: 142 MMOL/L
SODIUM SERPL-SCNC: 143 MMOL/L
SP02: 93
SP02: 98
SP02: 98
TACROLIMUS BLD-MCNC: 14.4 NG/ML
TRANS ERYTHROCYTES VOL PATIENT: NORMAL ML
WBC # BLD AUTO: 15.2 K/UL
WBC # BLD AUTO: 16.42 K/UL
WBC # BLD AUTO: 21.46 K/UL

## 2017-10-22 PROCEDURE — 80100008 HC CRRT DAILY MAINTENANCE

## 2017-10-22 PROCEDURE — 85610 PROTHROMBIN TIME: CPT

## 2017-10-22 PROCEDURE — 25000003 PHARM REV CODE 250: Performed by: STUDENT IN AN ORGANIZED HEALTH CARE EDUCATION/TRAINING PROGRAM

## 2017-10-22 PROCEDURE — 80197 ASSAY OF TACROLIMUS: CPT

## 2017-10-22 PROCEDURE — 94799 UNLISTED PULMONARY SVC/PX: CPT

## 2017-10-22 PROCEDURE — 80053 COMPREHEN METABOLIC PANEL: CPT | Mod: 91

## 2017-10-22 PROCEDURE — 83735 ASSAY OF MAGNESIUM: CPT

## 2017-10-22 PROCEDURE — 87040 BLOOD CULTURE FOR BACTERIA: CPT | Mod: 59

## 2017-10-22 PROCEDURE — 90945 DIALYSIS ONE EVALUATION: CPT

## 2017-10-22 PROCEDURE — 80053 COMPREHEN METABOLIC PANEL: CPT

## 2017-10-22 PROCEDURE — 82248 BILIRUBIN DIRECT: CPT

## 2017-10-22 PROCEDURE — 83605 ASSAY OF LACTIC ACID: CPT

## 2017-10-22 PROCEDURE — 85730 THROMBOPLASTIN TIME PARTIAL: CPT

## 2017-10-22 PROCEDURE — 85025 COMPLETE CBC W/AUTO DIFF WBC: CPT | Mod: 91

## 2017-10-22 PROCEDURE — 37799 UNLISTED PX VASCULAR SURGERY: CPT

## 2017-10-22 PROCEDURE — 82803 BLOOD GASES ANY COMBINATION: CPT

## 2017-10-22 PROCEDURE — 25000003 PHARM REV CODE 250: Performed by: TRANSPLANT SURGERY

## 2017-10-22 PROCEDURE — 63600175 PHARM REV CODE 636 W HCPCS: Performed by: ANESTHESIOLOGY

## 2017-10-22 PROCEDURE — 27100171 HC OXYGEN HIGH FLOW UP TO 24 HOURS

## 2017-10-22 PROCEDURE — 84450 TRANSFERASE (AST) (SGOT): CPT

## 2017-10-22 PROCEDURE — 20000000 HC ICU ROOM

## 2017-10-22 PROCEDURE — 27000221 HC OXYGEN, UP TO 24 HOURS

## 2017-10-22 PROCEDURE — S0028 INJECTION, FAMOTIDINE, 20 MG: HCPCS | Performed by: TRANSPLANT SURGERY

## 2017-10-22 PROCEDURE — 63600175 PHARM REV CODE 636 W HCPCS: Performed by: STUDENT IN AN ORGANIZED HEALTH CARE EDUCATION/TRAINING PROGRAM

## 2017-10-22 PROCEDURE — 82977 ASSAY OF GGT: CPT

## 2017-10-22 PROCEDURE — 63600175 PHARM REV CODE 636 W HCPCS: Performed by: TRANSPLANT SURGERY

## 2017-10-22 PROCEDURE — 99900035 HC TECH TIME PER 15 MIN (STAT)

## 2017-10-22 PROCEDURE — 85610 PROTHROMBIN TIME: CPT | Mod: 91

## 2017-10-22 PROCEDURE — 80069 RENAL FUNCTION PANEL: CPT

## 2017-10-22 PROCEDURE — 99233 SBSQ HOSP IP/OBS HIGH 50: CPT | Mod: ,,, | Performed by: INTERNAL MEDICINE

## 2017-10-22 RX ORDER — HYDRALAZINE HYDROCHLORIDE 20 MG/ML
10 INJECTION INTRAMUSCULAR; INTRAVENOUS ONCE
Status: DISCONTINUED | OUTPATIENT
Start: 2017-10-22 | End: 2017-10-26

## 2017-10-22 RX ORDER — QUETIAPINE FUMARATE 25 MG/1
75 TABLET, FILM COATED ORAL NIGHTLY
Status: DISCONTINUED | OUTPATIENT
Start: 2017-10-22 | End: 2017-10-26

## 2017-10-22 RX ORDER — ALBUMIN HUMAN 50 G/1000ML
12.5 SOLUTION INTRAVENOUS ONCE
Status: COMPLETED | OUTPATIENT
Start: 2017-10-23 | End: 2017-10-23

## 2017-10-22 RX ORDER — HYDROMORPHONE HYDROCHLORIDE 1 MG/ML
0.5 INJECTION, SOLUTION INTRAMUSCULAR; INTRAVENOUS; SUBCUTANEOUS EVERY 6 HOURS PRN
Status: DISCONTINUED | OUTPATIENT
Start: 2017-10-22 | End: 2017-10-26

## 2017-10-22 RX ORDER — MAGNESIUM SULFATE HEPTAHYDRATE 40 MG/ML
2 INJECTION, SOLUTION INTRAVENOUS
Status: DISCONTINUED | OUTPATIENT
Start: 2017-10-22 | End: 2017-10-22

## 2017-10-22 RX ORDER — OXYCODONE HYDROCHLORIDE 5 MG/1
10 TABLET ORAL EVERY 6 HOURS PRN
Status: DISCONTINUED | OUTPATIENT
Start: 2017-10-22 | End: 2017-10-27

## 2017-10-22 RX ORDER — OXYCODONE HYDROCHLORIDE 5 MG/1
5 TABLET ORAL EVERY 6 HOURS PRN
Status: DISCONTINUED | OUTPATIENT
Start: 2017-10-22 | End: 2017-10-27

## 2017-10-22 RX ORDER — TACROLIMUS 1 MG/1
1 CAPSULE ORAL 2 TIMES DAILY
Status: DISCONTINUED | OUTPATIENT
Start: 2017-10-22 | End: 2017-10-26

## 2017-10-22 RX ADMIN — Medication 1000 MG: at 09:10

## 2017-10-22 RX ADMIN — HEPARIN SODIUM 5000 UNITS: 5000 INJECTION, SOLUTION INTRAVENOUS; SUBCUTANEOUS at 03:10

## 2017-10-22 RX ADMIN — MUPIROCIN 1 G: 20 OINTMENT TOPICAL at 08:10

## 2017-10-22 RX ADMIN — MORPHINE 450 MG: 10 SOLUTION ORAL at 09:10

## 2017-10-22 RX ADMIN — METHYLPREDNISOLONE SODIUM SUCCINATE 80 MG: 125 INJECTION, POWDER, FOR SOLUTION INTRAMUSCULAR; INTRAVENOUS at 09:10

## 2017-10-22 RX ADMIN — FAMOTIDINE 20 MG: 10 INJECTION, SOLUTION INTRAVENOUS at 09:10

## 2017-10-22 RX ADMIN — MEROPENEM AND SODIUM CHLORIDE 1 G: 1 INJECTION, SOLUTION INTRAVENOUS at 04:10

## 2017-10-22 RX ADMIN — HEPARIN SODIUM 5000 UNITS: 5000 INJECTION, SOLUTION INTRAVENOUS; SUBCUTANEOUS at 09:10

## 2017-10-22 RX ADMIN — MEROPENEM AND SODIUM CHLORIDE 1 G: 1 INJECTION, SOLUTION INTRAVENOUS at 09:10

## 2017-10-22 RX ADMIN — MORPHINE SULFATE 1 MG: 2 INJECTION, SOLUTION INTRAMUSCULAR; INTRAVENOUS at 11:10

## 2017-10-22 RX ADMIN — MORPHINE SULFATE 1 MG: 2 INJECTION, SOLUTION INTRAMUSCULAR; INTRAVENOUS at 12:10

## 2017-10-22 RX ADMIN — MEROPENEM AND SODIUM CHLORIDE 1 G: 1 INJECTION, SOLUTION INTRAVENOUS at 02:10

## 2017-10-22 RX ADMIN — MUPIROCIN 1 G: 20 OINTMENT TOPICAL at 09:10

## 2017-10-22 RX ADMIN — MORPHINE SULFATE 1 MG: 2 INJECTION, SOLUTION INTRAMUSCULAR; INTRAVENOUS at 06:10

## 2017-10-22 RX ADMIN — HEPARIN SODIUM 5000 UNITS: 5000 INJECTION, SOLUTION INTRAVENOUS; SUBCUTANEOUS at 05:10

## 2017-10-22 RX ADMIN — FAMOTIDINE 20 MG: 10 INJECTION, SOLUTION INTRAVENOUS at 08:10

## 2017-10-22 RX ADMIN — METHYLPREDNISOLONE SODIUM SUCCINATE 80 MG: 125 INJECTION, POWDER, FOR SOLUTION INTRAMUSCULAR; INTRAVENOUS at 08:10

## 2017-10-22 RX ADMIN — FLUCONAZOLE 200 MG: 2 INJECTION INTRAVENOUS at 03:10

## 2017-10-22 RX ADMIN — QUETIAPINE FUMARATE 75 MG: 25 TABLET, FILM COATED ORAL at 09:10

## 2017-10-22 RX ADMIN — TACROLIMUS 1 MG: 1 CAPSULE ORAL at 11:10

## 2017-10-22 RX ADMIN — TACROLIMUS 1 MG: 1 CAPSULE ORAL at 06:10

## 2017-10-22 NOTE — PLAN OF CARE
Problem: Patient Care Overview  Goal: Plan of Care Review  Outcome: Ongoing (interventions implemented as appropriate)  Pt on Venti Mask 8L FiO2 40%; sats %. VSS. A&Ox4 and following commands. Free from fall/injury overnight. Turned q2h to prevent skin breakdown. Pain adequately controlled with PRN Morphine IVP. CRRT overnight; UF tolerated at goal of 400. IRISH drains with minimal serosanguinous output through night. Garcia in place; no UOP. Accuchecks monitored; no insulin coverage required. Labs monitored. Mother remains at bedside. Plan is to wean O2 as tolerated. Plan of care reviewed with pt and mother. All questions and concerns addressed. See flowsheet for full assessment details.

## 2017-10-22 NOTE — PT/OT/SLP PROGRESS
Physical Therapy      Jhonny Diana  MRN: [id number]    Patient not seen today secondary to RN hold. Per RN, pt not oriented and was attempting to pull lines out. Safer for pt to stay on restraints at this time.  Will follow-up when appropriate.    Aminata Hunter, PT   10/22/2017

## 2017-10-22 NOTE — PLAN OF CARE
Problem: Patient Care Overview  Goal: Plan of Care Review  Outcome: Ongoing (interventions implemented as appropriate)  Pt on 5L High-Flow NC ; sats %. VSS. Following commands but confused (only oriented to person). Free from fall/injury throughout shift. Turned q2h to prevent skin breakdown. Pain adequately controlled with PRN Morphine IVP. CRRT discontinued to monitor pt's renal function. IRISH drains with minimal serosanguinous output. Garcia in place; no UOP. Accuchecks monitored; no insulin coverage required. Labs monitored. Pt's femoral a-line and fem dialysis line pulled today; no hematoma development/bruising. Mother remains at bedside. Plan is to wean O2 as tolerated and hold CRRT overnight. Plan of care reviewed with pt and mother. All questions and concerns addressed. See flowsheet for full assessment details.

## 2017-10-22 NOTE — SUBJECTIVE & OBJECTIVE
Interval History: JOCELIN rubin SLED overnight, Clotted this am. Net Neg 3122 ml/24hrs. CVP 4. Oxygenation weaning O2 this PM on 2 ltsNC    Review of patient's allergies indicates:   Allergen Reactions    Bactrim [sulfamethoxazole-trimethoprim] Other (See Comments)     Mookie Trell Syndrome     Current Facility-Administered Medications   Medication Frequency    dextrose 5 % and 0.45 % NaCl infusion Continuous    dextrose 50% injection 12.5 g PRN    famotidine (PF) injection 20 mg Q12H    fluconazole (DIFLUCAN) IVPB 200 mg 100 mL Q24H    glucagon (human recombinant) injection 1 mg PRN    heparin (porcine) injection 5,000 Units Q8H    hydrALAZINE injection 10 mg Once    hydromorphone injection 0.5 mg Q6H PRN    insulin aspart pen 0-5 Units PRN    meropenem-0.9% sodium chloride 1 g/50 mL IVPB Q8H    methylPREDNISolone sodium succinate injection 80 mg Q12H    Followed by    [START ON 10/23/2017] methylPREDNISolone sodium succinate injection 60 mg Q12H    Followed by    [START ON 10/24/2017] methylPREDNISolone sodium succinate injection 40 mg Q12H    Followed by    [START ON 10/25/2017] methylPREDNISolone sodium succinate injection 20 mg Q12H    Followed by    [START ON 10/26/2017] predniSONE tablet 20 mg Daily    mupirocin 2 % ointment 1 g BID    mycophenolate mofetil 200 mg/mL suspension 1,000 mg BID    oxycodone immediate release tablet 10 mg Q6H PRN    oxycodone immediate release tablet 5 mg Q6H PRN    quetiapine tablet 75 mg QHS    tacrolimus capsule 1 mg BID    valganciclovir 50 mg/ml oral solution 450 mg Daily       Objective:     Vital Signs (Most Recent):  Temp: 97.5 °F (36.4 °C) (10/22/17 1500)  Pulse: 92 (10/22/17 1750)  Resp: 18 (10/21/17 1700)  BP: (!) 143/84 (10/22/17 1700)  SpO2: 100 % (10/22/17 1750)  O2 Device (Oxygen Therapy): High Flow nasal Cannula (10/22/17 1750) Vital Signs (24h Range):  Temp:  [97.5 °F (36.4 °C)-97.8 °F (36.6 °C)] 97.5 °F (36.4 °C)  Pulse:  []  92  SpO2:  [88 %-100 %] 100 %  BP: (102-143)/(61-84) 143/84  Arterial Line BP: (113-169)/(60-94) 144/77     Weight: 88.2 kg (194 lb 7.1 oz) (10/22/17 0402)  Body mass index is 30.45 kg/m².  Body surface area is 2.04 meters squared.    I/O last 3 completed shifts:  In: 7263 [I.V.:7203; NG/GT:60]  Out: 97060 [Urine:112; Drains:1475; Other:9156]    Physical Exam   Constitutional: He is oriented to person, place, and time. He appears well-developed and well-nourished. He is easily aroused. No distress. He is sedated and not intubated.   HENT:   Head: Normocephalic and atraumatic.   Eyes: Conjunctivae and EOM are normal. Pupils are equal, round, and reactive to light. Scleral icterus is present.   Neck: Trachea normal and normal range of motion. Neck supple. No JVD present.   Cardiovascular: Normal rate, regular rhythm, S1 normal, S2 normal, normal heart sounds, intact distal pulses and normal pulses.  Exam reveals no gallop and no friction rub.    No murmur heard.  Pulmonary/Chest: Effort normal and breath sounds normal. He is not intubated.   Abdominal: Soft. Bowel sounds are normal. He exhibits distension.   Musculoskeletal: Normal range of motion. He exhibits edema. He exhibits no deformity.   Neurological: He is alert, oriented to person, place, and time and easily aroused.   Skin: Skin is warm and dry. Capillary refill takes less than 2 seconds. He is not diaphoretic.   Psychiatric: He has a normal mood and affect. His behavior is normal.   Vitals reviewed.      Significant Labs:  BMP:     Recent Labs  Lab 10/22/17  0809 10/22/17  1610   GLU  --  117*   CL  --  108   CO2  --  25   BUN  --  18   CREATININE  --  1.7*   CALCIUM  --  9.7   MG 2.5  2.5  --      Cardiac Markers: No results for input(s): CKMB, TROPONINT, MYOGLOBIN in the last 168 hours.  CBC:     Recent Labs  Lab 10/22/17  1610   WBC 15.20*   RBC 2.83*   HGB 8.8*   HCT 24.5*   PLT 66*   MCV 87   MCH 31.1*   MCHC 35.9     CMP:     Recent Labs  Lab  10/22/17  1610   *   CALCIUM 9.7   ALBUMIN 1.8*   PROT 4.1*      K 4.6   CO2 25      BUN 18   CREATININE 1.7*   ALKPHOS 85   *   *   BILITOT 9.1*     Coagulation:     Recent Labs  Lab 10/22/17  0500 10/22/17  0809   INR 1.2 1.2   APTT 34.4*  --        Recent Labs  Lab 10/18/17  1439   COLORU Martha   SPECGRAV 1.010   PHUR 5.0   PROTEINUA Negative   NITRITE Negative   LEUKOCYTESUR Negative   UROBILINOGEN Negative     All labs within the past 24 hours have been reviewed.     Significant Imaging:  Labs: Reviewed  X-Ray: Reviewed

## 2017-10-22 NOTE — PROGRESS NOTES
[facility]  Nephrology  Progress Note    Patient Name: Jhonny Diana  MRN: [id number]  Admission Date: 9/27/2017  Hospital Length of Stay: 25 days  Attending Provider: Solis Chapa MD   Primary Care Physician: Provider Notinsystem  Principal Problem:Liver replaced by transplant    Subjective:     HPI: Jhonny Diana is a 26 yo male with pMHX of childhood asthma and alcohol abuse who was was transferred to [facility] for hepatology evaluation for worsening liver functions.  Information was obtained from family at bedside and review of outside records, given that patient is obtunded.  According to mother at bedside, patient has had multiple admissions to the hospital over the past 6 months for recurrent abdominal pain.  She reports that he has been been seen the ED and has been diagnosed with diverticulitis, UTIs, gastroenteritis, and cholecystitis.  He was never admitted to the hospital and was always discharged.  On 09/06, patient presented to OSH for worsening abdominal pain and distension.  He was diagnosed at that time with ETOH hepatitis.  He was treated with steroids, ceftriaxone for SBP prophylaxis, and diuresed at that time for volume management.  He was discharged home on the 15th, after clearance from GI,a nd was instructed to follow-up with his PCP 1 week after discharge.  He presented to the hospital on 09/25 for worsening abdominal pain and distension with 20 kg weight gain.  He was transferred to [facility] for further care and Nephrology was consulted for LAURA.    On review of outside records, patient Scr on admission was 1.5 (with baseline at 1.0).  According to the family at the bedside, he was not urinating well at the OSH, and not responding to diuretics (spirionolactone/lasix combo).  His mother reports that patient continued to c/o abdominal and inability to urinate, but no chicas was placed until transfer to [facility] yesterday.  Record reviews shows relative hypotension with BPs in the low 90's.  His  mother reports that the nursing staff was administrating his narcotic regimen based on his blood pressures, and at times, medication was held due to hypotension.  Since admission to hospital, his UOP has improved, responding well in Lasix 40 IV (770 ml since admission, 700 ml so far this shift at time of consultation).  Admit labs revealed a SCr of 2.6, this morning improved down to 2.4.  Bilirubin remains elevated, >14.  Abdomen remains with tight distension, unable to perform paracentesis, as no significant fluid pocket found.  Mother reports patient did not have para's done at OSH.    Labs on the morning of 09/27 with bicarb of 15.  ABG with alkalemia and primary respiratory alkalosis and anion gap acidosis.     Interval History: NAEON Tolerated wel SLED overnight, Clotted this am. Net Neg 3122 ml/24hrs. CVP 4. Oxygenation weaning O2 this PM on 2 ltsNC    Review of patient's allergies indicates:   Allergen Reactions    Bactrim [sulfamethoxazole-trimethoprim] Other (See Comments)     Mookie Trell Syndrome     Current Facility-Administered Medications   Medication Frequency    dextrose 5 % and 0.45 % NaCl infusion Continuous    dextrose 50% injection 12.5 g PRN    famotidine (PF) injection 20 mg Q12H    fluconazole (DIFLUCAN) IVPB 200 mg 100 mL Q24H    glucagon (human recombinant) injection 1 mg PRN    heparin (porcine) injection 5,000 Units Q8H    hydrALAZINE injection 10 mg Once    hydromorphone injection 0.5 mg Q6H PRN    insulin aspart pen 0-5 Units PRN    meropenem-0.9% sodium chloride 1 g/50 mL IVPB Q8H    methylPREDNISolone sodium succinate injection 80 mg Q12H    Followed by    [START ON 10/23/2017] methylPREDNISolone sodium succinate injection 60 mg Q12H    Followed by    [START ON 10/24/2017] methylPREDNISolone sodium succinate injection 40 mg Q12H    Followed by    [START ON 10/25/2017] methylPREDNISolone sodium succinate injection 20 mg Q12H    Followed by    [START ON 10/26/2017]  predniSONE tablet 20 mg Daily    mupirocin 2 % ointment 1 g BID    mycophenolate mofetil 200 mg/mL suspension 1,000 mg BID    oxycodone immediate release tablet 10 mg Q6H PRN    oxycodone immediate release tablet 5 mg Q6H PRN    quetiapine tablet 75 mg QHS    tacrolimus capsule 1 mg BID    valganciclovir 50 mg/ml oral solution 450 mg Daily       Objective:     Vital Signs (Most Recent):  Temp: 97.5 °F (36.4 °C) (10/22/17 1500)  Pulse: 92 (10/22/17 1750)  Resp: 18 (10/21/17 1700)  BP: (!) 143/84 (10/22/17 1700)  SpO2: 100 % (10/22/17 1750)  O2 Device (Oxygen Therapy): High Flow nasal Cannula (10/22/17 1750) Vital Signs (24h Range):  Temp:  [97.5 °F (36.4 °C)-97.8 °F (36.6 °C)] 97.5 °F (36.4 °C)  Pulse:  [] 92  SpO2:  [88 %-100 %] 100 %  BP: (102-143)/(61-84) 143/84  Arterial Line BP: (113-169)/(60-94) 144/77     Weight: 88.2 kg (194 lb 7.1 oz) (10/22/17 0402)  Body mass index is 30.45 kg/m².  Body surface area is 2.04 meters squared.    I/O last 3 completed shifts:  In: 7263 [I.V.:7203; NG/GT:60]  Out: 46518 [Urine:112; Drains:1475; Other:9156]    Physical Exam   Constitutional: He is oriented to person, place, and time. He appears well-developed and well-nourished. He is easily aroused. No distress. He is sedated and not intubated.   HENT:   Head: Normocephalic and atraumatic.   Eyes: Conjunctivae and EOM are normal. Pupils are equal, round, and reactive to light. Scleral icterus is present.   Neck: Trachea normal and normal range of motion. Neck supple. No JVD present.   Cardiovascular: Normal rate, regular rhythm, S1 normal, S2 normal, normal heart sounds, intact distal pulses and normal pulses.  Exam reveals no gallop and no friction rub.    No murmur heard.  Pulmonary/Chest: Effort normal and breath sounds normal. He is not intubated.   Abdominal: Soft. Bowel sounds are normal. He exhibits distension.   Musculoskeletal: Normal range of motion. He exhibits edema. He exhibits no deformity.    Neurological: He is alert, oriented to person, place, and time and easily aroused.   Skin: Skin is warm and dry. Capillary refill takes less than 2 seconds. He is not diaphoretic.   Psychiatric: He has a normal mood and affect. His behavior is normal.   Vitals reviewed.      Significant Labs:  BMP:     Recent Labs  Lab 10/22/17  0809 10/22/17  1610   GLU  --  117*   CL  --  108   CO2  --  25   BUN  --  18   CREATININE  --  1.7*   CALCIUM  --  9.7   MG 2.5  2.5  --      Cardiac Markers: No results for input(s): CKMB, TROPONINT, MYOGLOBIN in the last 168 hours.  CBC:     Recent Labs  Lab 10/22/17  1610   WBC 15.20*   RBC 2.83*   HGB 8.8*   HCT 24.5*   PLT 66*   MCV 87   MCH 31.1*   MCHC 35.9     CMP:     Recent Labs  Lab 10/22/17  1610   *   CALCIUM 9.7   ALBUMIN 1.8*   PROT 4.1*      K 4.6   CO2 25      BUN 18   CREATININE 1.7*   ALKPHOS 85   *   *   BILITOT 9.1*     Coagulation:     Recent Labs  Lab 10/22/17  0500 10/22/17  0809   INR 1.2 1.2   APTT 34.4*  --        Recent Labs  Lab 10/18/17  1439   COLORU Martha   SPECGRAV 1.010   PHUR 5.0   PROTEINUA Negative   NITRITE Negative   LEUKOCYTESUR Negative   UROBILINOGEN Negative     All labs within the past 24 hours have been reviewed.     Significant Imaging:  Labs: Reviewed  X-Ray: Reviewed    Assessment/Plan:     LAURA (acute kidney injury)    S/p Liver transplant and HD during surgery    - likely ischemic ATN from decreased renal perfusion (volume shifts from paracentesis, anemia)  - urine sediment with muddy brown casts  - Will hold SLED overnight Seems euvolemic  - Will need citrate if need more SLED            Thank you for your consult. I will follow-up with patient. Please contact us if you have any additional questions.    Krish Frausto MD  Nephrology  [facility]      I have reviewed and concur with the fellow's history, physical, assessment, and plan. I have personally interviewed and examined the  patient at bedside.

## 2017-10-22 NOTE — ASSESSMENT & PLAN NOTE
S/p Liver transplant and HD during surgery    - likely ischemic ATN from decreased renal perfusion (volume shifts from paracentesis, anemia)  - urine sediment with muddy brown casts  - Will hold SLED overnight Seems euvolemic  - Will need citrate if need more SLED

## 2017-10-22 NOTE — PROGRESS NOTES
[facility]  Transplant Surgery  Progress Note      Hospital Day Hospital Day: 26  Post-Op Day 3 Days Post-Op    Admit Diagnosis: ESLD  Procedures: OLTx    ORGAN:   LIVER  Disease Etiology: Acute Alcoholic Hepatitis  Donor Type:    - Brain Death  CDC High Risk:   No  Donor CMV Status:   Donor CMV Status: Positive  Donor HBcAB:   Negative  Donor HCV Status:   Negative  Whole or Partial: Whole Liver  Biliary Anastomosis: End to End  Arterial Anatomy: Standard      Subjective:     Interval History:   No acute events last night. CRRT overnight. Extubated yesterday, decreasing supplemental o2 req. CXR somewhat improved. Pain moderately controlled. Oriented but confused at times. States he feels short of breath.     Medications:  Continuous Infusions:   dextrose 5 % and 0.45 % NaCl 10 mL/hr at 10/22/17 1200     Scheduled Meds:   famotidine (PF)  20 mg Intravenous Q12H    fluconazole (DIFLUCAN) IVPB  200 mg Intravenous Q24H    heparin (porcine)  5,000 Units Subcutaneous Q8H    hydrALAZINE  10 mg Intravenous Once    meropenem (MERREM) IVPB  1 g Intravenous Q8H    methylPREDNISolone sodium succinate  80 mg Intravenous Q12H    Followed by    [START ON 10/23/2017] methylPREDNISolone sodium succinate  60 mg Intravenous Q12H    Followed by    [START ON 10/24/2017] methylPREDNISolone sodium succinate  40 mg Intravenous Q12H    Followed by    [START ON 10/25/2017] methylPREDNISolone sodium succinate  20 mg Intravenous Q12H    Followed by    [START ON 10/26/2017] predniSONE  20 mg Oral Daily    mupirocin  1 g Nasal BID    mycophenolate mofetil  1,000 mg Oral BID    quetiapine  75 mg Oral QHS    tacrolimus  1 mg Oral BID    valganciclovir 50 mg/ml  450 mg Per NG tube Daily     PRN Meds:calcium chloride IVPB, dextrose 50%, dextrose-sod citrate-citric ac, glucagon (human recombinant), insulin aspart, magnesium sulfate IVPB, morphine     Objective:     Vital Signs (Most Recent):  Temp: 97.5 °F (36.4  °C) (10/22/17 1200)  Pulse: 93 (10/22/17 1200)  Resp: 18 (10/21/17 1700)  BP: 133/82 (10/22/17 1200)  SpO2: 100 % (10/22/17 1200) Vital Signs (24h Range):  Temp:  [97.5 °F (36.4 °C)-98.4 °F (36.9 °C)] 97.5 °F (36.4 °C)  Pulse:  [] 93  Resp:  [18] 18  SpO2:  [88 %-100 %] 100 %  BP: (102-139)/(61-84) 133/82  Arterial Line BP: (113-169)/(60-87) 136/73       Intake/Output Summary (Last 24 hours) at 10/22/17 1249  Last data filed at 10/22/17 1200   Gross per 24 hour   Intake             5244 ml   Output             7278 ml   Net            -2034 ml       Neuro:  GCS: 10 (e3,v1t,m6)  Sedation:  yes     Restraints:  yes  Neurologic: Mental status: follows commands    Cardiac:      Temp:  [97.5 °F (36.4 °C)-98.4 °F (36.9 °C)]   Pulse:  []   Resp:  [18]   BP: (102-139)/(61-84)   SpO2:  [88 %-100 %]   Arterial Line BP: (113-169)/(60-87)   Heart: regular rate and rhythm, S1, S2 normal, no murmur, rub or gallop  Pulses: 2+ and symmetric    Pulmonary:  Vent Mode: Spont  Oxygen Concentration (%):  [40-55] 55  Resp Rate Total:  [15 br/min-29 br/min] 23 br/min  Vt Set:  [500 mL] 500 mL  PEEP/CPAP:  [5 cmH20] 5 cmH20  Pressure Support:  [0 cmH20-5 cmH20] 0 cmH20  Mean Airway Pressure:  [6.1 cmH20-9.6 cmH20] 6.3 cmH20       Recent Labs  Lab 10/22/17  0814   PH 7.472*   PO2 88   PCO2 36.4   HCO3 26.6   BE 3       Lungs:  mechanical breath sounds  Chest X-ray: pulm edema    Renal:   I & O (Last 24H):    Intake/Output Summary (Last 24 hours) at 10/22/17 1249  Last data filed at 10/22/17 1200   Gross per 24 hour   Intake             5244 ml   Output             7278 ml   Net            -2034 ml       Recent Labs  Lab 10/21/17  2200 10/22/17  0400 10/22/17  0500   BUN 19  19 14  14 14   CREATININE 1.3  1.3 1.3  1.3 1.2   K 3.7  3.7 4.1  4.1 4.1       Recent Labs  Lab 10/21/17  2200 10/22/17  0400 10/22/17  0500     138 143  143 142   K 3.7  3.7 4.1  4.1 4.1     107 108  108 107   CO2 19*  19* 21*  21*  22*   CALCIUM 8.4*  8.4* 10.2  10.2 10.1     110 120*  120* 118*       Recent Labs  Lab 10/21/17  1359 10/21/17  2200 10/21/17  2330 10/22/17  0400 10/22/17  0809   PHOS 3.1 3.3  3.3  --  3.2  3.2  --    MG 1.9  --  2.7*  2.7*  --  2.5  2.5       FEN/GI    Recent Labs  Lab 10/20/17  0715  10/21/17  0500  10/21/17  2003 10/21/17  2200 10/22/17  0400 10/22/17  0500 10/22/17  0809   BILITOT 17.5*  --  9.1*  --   --   --   --  9.2*  --    *  335*  < > 182*  < > 249*  --   --  229* 218*   *  --  120*  --   --   --   --  123*  --    ALKPHOS 65  --  70  --   --   --   --  85  --    ALBUMIN 2.0*  < > 1.8*  < >  --  2.0*  2.0* 1.9*  1.9* 1.9*  --    < > = values in this interval not displayed.    Recent Labs  Lab 10/21/17  2003 10/22/17  0500 10/22/17  0809   INR 1.1 1.2 1.2      Nutrition: NPO  Abdomen: soft, non-tender non-distented; bowel sounds normal; no masses,  no organomegaly and chevron with minimal drsg soakthrough, serosang drain output  GI ulcer Prophylaxis: yes    HEME:     Recent Labs  Lab 10/21/17  2003 10/22/17  0500 10/22/17  0809   WBC 25.10* 21.46* 16.42*   HGB 10.4* 9.5* 9.0*   HCT 29.2* 25.8* 24.9*   * 66* 58*     DVT Prophylaxis: Pharmacologic yes  Mechanical: yes     Infectious Disease:  Temp:  [97.5 °F (36.4 °C)-98.4 °F (36.9 °C)]   Pulse:  []   Resp:  [18]   BP: (102-139)/(61-84)   SpO2:  [88 %-100 %]   Arterial Line BP: (113-169)/(60-87)   Antibiotics     Start     Stop Route Frequency Ordered    10/20/17 0900  mupirocin 2 % ointment 1 g      10/25 0859 Nasl 2 times daily 10/20/17 0346    10/19/17 2100  meropenem-0.9% sodium chloride 1 g/50 mL IVPB      -- IV Every 8 hours (non-standard times) 10/19/17 2012            Significant Diagnostics:  US tomorrow    Assessment/Plan:   Jhonny Diana is a 27 y.o. male s/p OLTx for acute alcoholic hepatitis.    Neuro: Post-operative pain  - Continue current pain control regimen- oxycodone and dilaudid for  breakthrough  - OOBTC, prevent day/night reversal  - seroquel tonight      Resp:  - Extubated 10/21/17  - Minimize supplemental O2 requirements  - Encourage IS, deep breathing, cough    CV:  - HDS  - No current vasopressor requirement  - prn for hypertension, unless sustained, will start nifedipine    Heme: Anemia  - Hgb/Hct 9.5 from 10.4  - Continue to trend    ID:  - on meropenem from Kleb in sputum 1/6, check G6PD for Bactrim allergy (SJS hx?) (dapsone would be approp,if not will start atorvaquine), diflucan  - note: there were a few serosal tears due to dense adhesions, which were over sewn (likely cont alena thru weekend)\  - culture today    Immuno: Immunocompromised state  - Continue current immunosuppression regimen  - Daily Prograf level  - Continue opportunistic infection prophylaxis (discussed no Bactrim)    Renal:  - Garcia in place  - Strict I/Os  - per nephrology, will hold CRRT today  - Trend CVP    FEN/GI: ESLD s/p OLT, transaminitis, protein calorie malnutrition  - Liver U/S tomorrow  - Abdominal drains to remain in place to bulb suction  - Monitor abdominal drain output and character  - Trend LFTs  - Replace lytes PRN    Endo: Hyperglycemia  - Endocrine following, appreciate assistance  - Accuchecks  - Insulin gtt weaned off per endocrine    Dispo: continue ICU care, get OOB to chair today  - Continue ICU care

## 2017-10-22 NOTE — CARE UPDATE
CRRT held per MD orders. VSS, pt oriented to person, but not place or time. Will continue to monitor pt closely. Plan is to allow pt to rest and monitor reaction without CRRT; will restart CRRT tomorrow if needed. UOP remains 0 throughou the shift. See flow sheets for specific details. Dr. Smith aware.

## 2017-10-23 LAB
ALBUMIN SERPL BCP-MCNC: 1.9 G/DL
ALBUMIN SERPL BCP-MCNC: 2 G/DL
ALP SERPL-CCNC: 104 U/L
ALP SERPL-CCNC: 104 U/L
ALP SERPL-CCNC: 166 U/L
ALP SERPL-CCNC: 98 U/L
ALT SERPL W/O P-5'-P-CCNC: 68 U/L
ALT SERPL W/O P-5'-P-CCNC: 73 U/L
ALT SERPL W/O P-5'-P-CCNC: 73 U/L
ALT SERPL W/O P-5'-P-CCNC: 75 U/L
ANION GAP SERPL CALC-SCNC: 7 MMOL/L
ANION GAP SERPL CALC-SCNC: 8 MMOL/L
ANION GAP SERPL CALC-SCNC: 8 MMOL/L
ANION GAP SERPL CALC-SCNC: 9 MMOL/L
APTT BLDCRRT: 31.4 SEC
AST SERPL-CCNC: 124 U/L
AST SERPL-CCNC: 133 U/L
AST SERPL-CCNC: 139 U/L
AST SERPL-CCNC: 139 U/L
BACTERIA SPEC AEROBE CULT: NO GROWTH
BASOPHILS # BLD AUTO: 0 K/UL
BASOPHILS # BLD AUTO: 0.01 K/UL
BASOPHILS NFR BLD: 0 %
BASOPHILS NFR BLD: 0.1 %
BILIRUB DIRECT SERPL-MCNC: 6.4 MG/DL
BILIRUB SERPL-MCNC: 7.3 MG/DL
BILIRUB SERPL-MCNC: 7.9 MG/DL
BILIRUB SERPL-MCNC: 7.9 MG/DL
BILIRUB SERPL-MCNC: 8 MG/DL
BLD PROD TYP BPU: NORMAL
BLOOD UNIT EXPIRATION DATE: NORMAL
BLOOD UNIT TYPE CODE: 5100
BLOOD UNIT TYPE: NORMAL
BUN SERPL-MCNC: 26 MG/DL
BUN SERPL-MCNC: 26 MG/DL
BUN SERPL-MCNC: 28 MG/DL
BUN SERPL-MCNC: 28 MG/DL
CALCIUM SERPL-MCNC: 7.5 MG/DL
CALCIUM SERPL-MCNC: 8.7 MG/DL
CHLORIDE SERPL-SCNC: 103 MMOL/L
CHLORIDE SERPL-SCNC: 104 MMOL/L
CO2 SERPL-SCNC: 24 MMOL/L
CO2 SERPL-SCNC: 24 MMOL/L
CO2 SERPL-SCNC: 25 MMOL/L
CO2 SERPL-SCNC: 25 MMOL/L
CODING SYSTEM: NORMAL
CREAT SERPL-MCNC: 2 MG/DL
CREAT SERPL-MCNC: 2.2 MG/DL
CREAT SERPL-MCNC: 2.4 MG/DL
CREAT SERPL-MCNC: 2.4 MG/DL
DIFFERENTIAL METHOD: ABNORMAL
DISPENSE STATUS: NORMAL
EOSINOPHIL # BLD AUTO: 0 K/UL
EOSINOPHIL NFR BLD: 0 %
EOSINOPHIL NFR BLD: 0 %
EOSINOPHIL NFR BLD: 0.1 %
EOSINOPHIL NFR BLD: 0.1 %
ERYTHROCYTE [DISTWIDTH] IN BLOOD BY AUTOMATED COUNT: 22.5 %
ERYTHROCYTE [DISTWIDTH] IN BLOOD BY AUTOMATED COUNT: 22.6 %
ERYTHROCYTE [DISTWIDTH] IN BLOOD BY AUTOMATED COUNT: 22.7 %
ERYTHROCYTE [DISTWIDTH] IN BLOOD BY AUTOMATED COUNT: 22.8 %
EST. GFR  (AFRICAN AMERICAN): 40.9 ML/MIN/1.73 M^2
EST. GFR  (AFRICAN AMERICAN): 40.9 ML/MIN/1.73 M^2
EST. GFR  (AFRICAN AMERICAN): 45.4 ML/MIN/1.73 M^2
EST. GFR  (AFRICAN AMERICAN): 51 ML/MIN/1.73 M^2
EST. GFR  (NON AFRICAN AMERICAN): 35.4 ML/MIN/1.73 M^2
EST. GFR  (NON AFRICAN AMERICAN): 35.4 ML/MIN/1.73 M^2
EST. GFR  (NON AFRICAN AMERICAN): 39.3 ML/MIN/1.73 M^2
EST. GFR  (NON AFRICAN AMERICAN): 44.1 ML/MIN/1.73 M^2
G6PD RBC-CCNT: 9.7 U/G HGB (ref 7–20.5)
GGT SERPL-CCNC: 152 U/L
GLUCOSE SERPL-MCNC: 112 MG/DL
GLUCOSE SERPL-MCNC: 113 MG/DL
GLUCOSE SERPL-MCNC: 116 MG/DL
GLUCOSE SERPL-MCNC: 116 MG/DL
GLUCOSE-6-PHOSPHATE DEHYDROGENASE QUAL: NORMAL
HCT VFR BLD AUTO: 20.1 %
HCT VFR BLD AUTO: 21.2 %
HCT VFR BLD AUTO: 21.4 %
HCT VFR BLD AUTO: 23.3 %
HEPATITIS B VIRAL DNA - QUANTITATIVE: <10 IU/ML
HEPATITIS B VIRUS DNA: NOT DETECTED
HGB BLD-MCNC: 7.1 G/DL
HGB BLD-MCNC: 7.3 G/DL
HGB BLD-MCNC: 7.3 G/DL
HGB BLD-MCNC: 8.1 G/DL
IMM GRANULOCYTES # BLD AUTO: 0.1 K/UL
IMM GRANULOCYTES # BLD AUTO: 0.14 K/UL
IMM GRANULOCYTES # BLD AUTO: 0.14 K/UL
IMM GRANULOCYTES # BLD AUTO: 0.21 K/UL
IMM GRANULOCYTES NFR BLD AUTO: 1.3 %
IMM GRANULOCYTES NFR BLD AUTO: 1.6 %
IMM GRANULOCYTES NFR BLD AUTO: 1.6 %
IMM GRANULOCYTES NFR BLD AUTO: 1.7 %
INR PPP: 1
INR PPP: 1.1
INR PPP: 1.1
LACTATE SERPL-SCNC: 1.1 MMOL/L
LOG HBV IU/ML: <1 LOG (10) IU/ML
LYMPHOCYTES # BLD AUTO: 0.7 K/UL
LYMPHOCYTES # BLD AUTO: 0.8 K/UL
LYMPHOCYTES # BLD AUTO: 0.8 K/UL
LYMPHOCYTES # BLD AUTO: 1.1 K/UL
LYMPHOCYTES NFR BLD: 10.3 %
LYMPHOCYTES NFR BLD: 8.5 %
LYMPHOCYTES NFR BLD: 8.5 %
LYMPHOCYTES NFR BLD: 9.6 %
MCH RBC QN AUTO: 30.3 PG
MCH RBC QN AUTO: 30.5 PG
MCH RBC QN AUTO: 30.5 PG
MCH RBC QN AUTO: 30.8 PG
MCHC RBC AUTO-ENTMCNC: 34.1 G/DL
MCHC RBC AUTO-ENTMCNC: 34.4 G/DL
MCHC RBC AUTO-ENTMCNC: 34.8 G/DL
MCHC RBC AUTO-ENTMCNC: 35.3 G/DL
MCV RBC AUTO: 86 FL
MCV RBC AUTO: 88 FL
MCV RBC AUTO: 89 FL
MCV RBC AUTO: 90 FL
MONOCYTES # BLD AUTO: 0.3 K/UL
MONOCYTES # BLD AUTO: 0.4 K/UL
MONOCYTES # BLD AUTO: 0.5 K/UL
MONOCYTES # BLD AUTO: 0.7 K/UL
MONOCYTES NFR BLD: 3.7 %
MONOCYTES NFR BLD: 5.1 %
MONOCYTES NFR BLD: 5.6 %
MONOCYTES NFR BLD: 5.7 %
NEUTROPHILS # BLD AUTO: 10.4 K/UL
NEUTROPHILS # BLD AUTO: 6.4 K/UL
NEUTROPHILS # BLD AUTO: 7.3 K/UL
NEUTROPHILS # BLD AUTO: 7.4 K/UL
NEUTROPHILS NFR BLD: 83.1 %
NEUTROPHILS NFR BLD: 83.1 %
NEUTROPHILS NFR BLD: 84 %
NEUTROPHILS NFR BLD: 86.1 %
NRBC BLD-RTO: 0 /100 WBC
NRBC BLD-RTO: 0 /100 WBC
NRBC BLD-RTO: 1 /100 WBC
NRBC BLD-RTO: 1 /100 WBC
PLATELET # BLD AUTO: 48 K/UL
PLATELET # BLD AUTO: 51 K/UL
PLATELET # BLD AUTO: 58 K/UL
PLATELET # BLD AUTO: 63 K/UL
PMV BLD AUTO: 11.2 FL
PMV BLD AUTO: 11.5 FL
PMV BLD AUTO: 12.4 FL
PMV BLD AUTO: 13 FL
POCT GLUCOSE: 120 MG/DL (ref 70–110)
POCT GLUCOSE: 123 MG/DL (ref 70–110)
POCT GLUCOSE: 125 MG/DL (ref 70–110)
POCT GLUCOSE: 137 MG/DL (ref 70–110)
POCT GLUCOSE: 137 MG/DL (ref 70–110)
POTASSIUM SERPL-SCNC: 4 MMOL/L
POTASSIUM SERPL-SCNC: 4.6 MMOL/L
POTASSIUM SERPL-SCNC: 4.6 MMOL/L
POTASSIUM SERPL-SCNC: 4.8 MMOL/L
PROT SERPL-MCNC: 3.9 G/DL
PROT SERPL-MCNC: 4 G/DL
PROTHROMBIN TIME: 11.1 SEC
PROTHROMBIN TIME: 11.4 SEC
PROTHROMBIN TIME: 11.4 SEC
RBC # BLD AUTO: 2.33 M/UL
RBC # BLD AUTO: 2.39 M/UL
RBC # BLD AUTO: 2.41 M/UL
RBC # BLD AUTO: 2.63 M/UL
SODIUM SERPL-SCNC: 134 MMOL/L
SODIUM SERPL-SCNC: 137 MMOL/L
TACROLIMUS BLD-MCNC: 9 NG/ML
TRANS ERYTHROCYTES VOL PATIENT: NORMAL ML
WBC # BLD AUTO: 12.35 K/UL
WBC # BLD AUTO: 7.66 K/UL
WBC # BLD AUTO: 8.58 K/UL
WBC # BLD AUTO: 8.79 K/UL

## 2017-10-23 PROCEDURE — P9045 ALBUMIN (HUMAN), 5%, 250 ML: HCPCS | Performed by: STUDENT IN AN ORGANIZED HEALTH CARE EDUCATION/TRAINING PROGRAM

## 2017-10-23 PROCEDURE — 63600175 PHARM REV CODE 636 W HCPCS: Performed by: STUDENT IN AN ORGANIZED HEALTH CARE EDUCATION/TRAINING PROGRAM

## 2017-10-23 PROCEDURE — 36593 DECLOT VASCULAR DEVICE: CPT

## 2017-10-23 PROCEDURE — 36430 TRANSFUSION BLD/BLD COMPNT: CPT

## 2017-10-23 PROCEDURE — 85610 PROTHROMBIN TIME: CPT | Mod: 91

## 2017-10-23 PROCEDURE — 25000003 PHARM REV CODE 250: Performed by: STUDENT IN AN ORGANIZED HEALTH CARE EDUCATION/TRAINING PROGRAM

## 2017-10-23 PROCEDURE — 80053 COMPREHEN METABOLIC PANEL: CPT | Mod: 91

## 2017-10-23 PROCEDURE — 90935 HEMODIALYSIS ONE EVALUATION: CPT

## 2017-10-23 PROCEDURE — 85730 THROMBOPLASTIN TIME PARTIAL: CPT

## 2017-10-23 PROCEDURE — P9021 RED BLOOD CELLS UNIT: HCPCS

## 2017-10-23 PROCEDURE — 82977 ASSAY OF GGT: CPT

## 2017-10-23 PROCEDURE — 99232 SBSQ HOSP IP/OBS MODERATE 35: CPT | Mod: ,,, | Performed by: NURSE PRACTITIONER

## 2017-10-23 PROCEDURE — S0028 INJECTION, FAMOTIDINE, 20 MG: HCPCS | Performed by: TRANSPLANT SURGERY

## 2017-10-23 PROCEDURE — 90935 HEMODIALYSIS ONE EVALUATION: CPT | Mod: ,,, | Performed by: INTERNAL MEDICINE

## 2017-10-23 PROCEDURE — 82248 BILIRUBIN DIRECT: CPT

## 2017-10-23 PROCEDURE — 25000003 PHARM REV CODE 250: Performed by: TRANSPLANT SURGERY

## 2017-10-23 PROCEDURE — 85025 COMPLETE CBC W/AUTO DIFF WBC: CPT | Mod: 91

## 2017-10-23 PROCEDURE — 83605 ASSAY OF LACTIC ACID: CPT

## 2017-10-23 PROCEDURE — 25000003 PHARM REV CODE 250: Performed by: INTERNAL MEDICINE

## 2017-10-23 PROCEDURE — 27100171 HC OXYGEN HIGH FLOW UP TO 24 HOURS

## 2017-10-23 PROCEDURE — 94799 UNLISTED PULMONARY SVC/PX: CPT

## 2017-10-23 PROCEDURE — 63600175 PHARM REV CODE 636 W HCPCS: Performed by: ANESTHESIOLOGY

## 2017-10-23 PROCEDURE — 63600175 PHARM REV CODE 636 W HCPCS: Performed by: TRANSPLANT SURGERY

## 2017-10-23 PROCEDURE — 27000221 HC OXYGEN, UP TO 24 HOURS

## 2017-10-23 PROCEDURE — 20000000 HC ICU ROOM

## 2017-10-23 PROCEDURE — 80197 ASSAY OF TACROLIMUS: CPT

## 2017-10-23 PROCEDURE — 85610 PROTHROMBIN TIME: CPT

## 2017-10-23 RX ORDER — SODIUM CHLORIDE 9 MG/ML
INJECTION, SOLUTION INTRAVENOUS
Status: DISCONTINUED | OUTPATIENT
Start: 2017-10-23 | End: 2017-10-24

## 2017-10-23 RX ORDER — INSULIN ASPART 100 [IU]/ML
0-5 INJECTION, SOLUTION INTRAVENOUS; SUBCUTANEOUS
Status: DISCONTINUED | OUTPATIENT
Start: 2017-10-23 | End: 2017-11-08 | Stop reason: HOSPADM

## 2017-10-23 RX ORDER — LIDOCAINE HYDROCHLORIDE 10 MG/ML
10 INJECTION INFILTRATION; PERINEURAL ONCE
Status: COMPLETED | OUTPATIENT
Start: 2017-10-23 | End: 2017-10-23

## 2017-10-23 RX ORDER — SODIUM CHLORIDE 9 MG/ML
INJECTION, SOLUTION INTRAVENOUS ONCE
Status: COMPLETED | OUTPATIENT
Start: 2017-10-23 | End: 2017-10-23

## 2017-10-23 RX ORDER — VALGANCICLOVIR HYDROCHLORIDE 50 MG/ML
100 POWDER, FOR SOLUTION ORAL
Status: DISCONTINUED | OUTPATIENT
Start: 2017-10-25 | End: 2017-10-25

## 2017-10-23 RX ORDER — HYDROCODONE BITARTRATE AND ACETAMINOPHEN 500; 5 MG/1; MG/1
TABLET ORAL
Status: DISCONTINUED | OUTPATIENT
Start: 2017-10-23 | End: 2017-10-26

## 2017-10-23 RX ORDER — VALGANCICLOVIR HYDROCHLORIDE 50 MG/ML
450 POWDER, FOR SOLUTION ORAL ONCE
Status: COMPLETED | OUTPATIENT
Start: 2017-10-23 | End: 2017-10-23

## 2017-10-23 RX ORDER — IBUPROFEN 200 MG
24 TABLET ORAL
Status: DISCONTINUED | OUTPATIENT
Start: 2017-10-23 | End: 2017-11-08 | Stop reason: HOSPADM

## 2017-10-23 RX ORDER — IBUPROFEN 200 MG
16 TABLET ORAL
Status: DISCONTINUED | OUTPATIENT
Start: 2017-10-23 | End: 2017-11-08 | Stop reason: HOSPADM

## 2017-10-23 RX ORDER — GLUCAGON 1 MG
1 KIT INJECTION
Status: DISCONTINUED | OUTPATIENT
Start: 2017-10-23 | End: 2017-11-08 | Stop reason: HOSPADM

## 2017-10-23 RX ADMIN — METHYLPREDNISOLONE SODIUM SUCCINATE 60 MG: 125 INJECTION, POWDER, FOR SOLUTION INTRAMUSCULAR; INTRAVENOUS at 09:10

## 2017-10-23 RX ADMIN — MORPHINE 450 MG: 10 SOLUTION ORAL at 10:10

## 2017-10-23 RX ADMIN — OXYCODONE HYDROCHLORIDE 5 MG: 5 TABLET ORAL at 07:10

## 2017-10-23 RX ADMIN — ALTEPLASE 2 MG: 2.2 INJECTION, POWDER, LYOPHILIZED, FOR SOLUTION INTRAVENOUS at 11:10

## 2017-10-23 RX ADMIN — MUPIROCIN 1 G: 20 OINTMENT TOPICAL at 09:10

## 2017-10-23 RX ADMIN — Medication 1000 MG: at 09:10

## 2017-10-23 RX ADMIN — LIDOCAINE HYDROCHLORIDE 10 ML: 10 INJECTION, SOLUTION INFILTRATION; PERINEURAL at 09:10

## 2017-10-23 RX ADMIN — HEPARIN SODIUM 5000 UNITS: 5000 INJECTION, SOLUTION INTRAVENOUS; SUBCUTANEOUS at 05:10

## 2017-10-23 RX ADMIN — ALBUMIN (HUMAN) 12.5 G: 12.5 SOLUTION INTRAVENOUS at 01:10

## 2017-10-23 RX ADMIN — SODIUM CHLORIDE: 0.9 INJECTION, SOLUTION INTRAVENOUS at 06:10

## 2017-10-23 RX ADMIN — MUPIROCIN 1 G: 20 OINTMENT TOPICAL at 08:10

## 2017-10-23 RX ADMIN — MEROPENEM AND SODIUM CHLORIDE 1 G: 1 INJECTION, SOLUTION INTRAVENOUS at 01:10

## 2017-10-23 RX ADMIN — FAMOTIDINE 20 MG: 10 INJECTION, SOLUTION INTRAVENOUS at 08:10

## 2017-10-23 RX ADMIN — QUETIAPINE FUMARATE 75 MG: 25 TABLET, FILM COATED ORAL at 09:10

## 2017-10-23 RX ADMIN — HEPARIN SODIUM 5000 UNITS: 5000 INJECTION, SOLUTION INTRAVENOUS; SUBCUTANEOUS at 09:10

## 2017-10-23 RX ADMIN — FAMOTIDINE 20 MG: 10 INJECTION, SOLUTION INTRAVENOUS at 09:10

## 2017-10-23 RX ADMIN — TACROLIMUS 1 MG: 1 CAPSULE ORAL at 08:10

## 2017-10-23 RX ADMIN — FLUCONAZOLE 200 MG: 2 INJECTION INTRAVENOUS at 04:10

## 2017-10-23 RX ADMIN — HEPARIN SODIUM 5000 UNITS: 5000 INJECTION, SOLUTION INTRAVENOUS; SUBCUTANEOUS at 01:10

## 2017-10-23 RX ADMIN — MEROPENEM AND SODIUM CHLORIDE 1 G: 1 INJECTION, SOLUTION INTRAVENOUS at 05:10

## 2017-10-23 RX ADMIN — TACROLIMUS 1 MG: 1 CAPSULE ORAL at 06:10

## 2017-10-23 NOTE — PROGRESS NOTES
EZEKIEL met with pts mother Deborah and pt at bedside this am.  Pt getting an us this morning and not engaging with SW as focusing on nurse giving medications and us being done.  Pts mom having some difficulty coping with pts employer and work disability agency [employer] and other companies that will not speak to her about pts bills at this time, regardless of her paperwork stating she has power of . Pts mom reports that she was having difficulty with people who hold pts car note, his bank and other agencies.   [facility] has pts claim for STD on hold, as they are looking for dc summaries from pts last two hospitalizations at [facility] in [place].  [facility] will not send this information unless pts mom goes to the hospital in person to sign the release of information to release those records, regardless that she is here with pt in [place] in the hospital.  Pts mom asking SW if we have those records that we requested from [employer].     EZEKIEL was able to assist pts mom with one of the dc summaries dated 9/15/17, but no other dc summaries found in pts records.   EZEKIEL assisted pts mom with verbal permission to fax this document to [employer] ([phone]) today.        Pts mom much calmer after SW provided listening support this am.  EZEKIEL did provide pts mom with tentative dc medication co-pay sheet as well this morning.  Pts mom able to provide pts insurance card, EZEKIEL made a copay and emailed to Ericka Zimmer in transplant financial services.   EZEKIEL will ensure [facility] has this insurance card as well.  Pts mom with no other psychosocial needs today.  Her sister has returned home, but is provided emotional and other support for pts mom from home.  Pts mom staying in pts room.   EZEKIEL remains available for all transplant transplant resources, education and psychosocial support.

## 2017-10-23 NOTE — SUBJECTIVE & OBJECTIVE
"Interval HPI:   Extubated on high flow NC, no hypoglycemia,no current insulin needs  Renal diet ordered this am  Afebrile    /70 (BP Location: Left arm, Patient Position: Lying)   Pulse 91   Temp 97.6 °F (36.4 °C) (Oral)   Resp 18   Ht 5' 7" (1.702 m)   Wt 87.8 kg (193 lb 9 oz)   SpO2 99%   BMI 30.32 kg/m²     Labs Reviewed and Include      Recent Labs  Lab 10/23/17  0813   *  116*   CALCIUM 8.7  8.7   ALBUMIN 1.9*  1.9*   PROT 3.9*  3.9*     137   K 4.6  4.6   CO2 25  25     104   BUN 28*  28*   CREATININE 2.4*  2.4*   ALKPHOS 104  104   ALT 73*  73*   *  124*  124*   BILITOT 7.9*  7.9*     Lab Results   Component Value Date    WBC 7.66 10/23/2017    HGB 7.3 (L) 10/23/2017    HCT 21.2 (L) 10/23/2017    MCV 88 10/23/2017    PLT 48 (L) 10/23/2017     No results for input(s): TSH, FREET4 in the last 168 hours.  Lab Results   Component Value Date    HGBA1C <4.0 (A) 10/18/2017       Nutritional status:   Body mass index is 30.32 kg/m².  Lab Results   Component Value Date    ALBUMIN 1.9 (L) 10/23/2017    ALBUMIN 1.9 (L) 10/23/2017    ALBUMIN 1.9 (L) 10/23/2017     Lab Results   Component Value Date    PREALBUMIN 5 (L) 10/11/2017       Estimated Creatinine Clearance: 48.5 mL/min (based on SCr of 2.4 mg/dL (H)).    Accu-Checks  Recent Labs      10/21/17   1955  10/21/17   2333  10/22/17   0358  10/22/17   0818  10/22/17   1231  10/22/17   1624  10/22/17   2031  10/23/17   0140  10/23/17   0505  10/23/17   0819   POCTGLUCOSE  123*  131*  138*  103  119*  131*  124*  137*  120*  123*       Current Medications and/or Treatments Impacting Glycemic Control  Immunotherapy:  Immunosuppressants         Stop Route Frequency     tacrolimus capsule 1 mg      -- Oral 2 times daily     mycophenolate mofetil 200 mg/mL suspension 1,000 mg      -- Oral 2 times daily        Steroids:   Hormones     Start     Stop Route Frequency Ordered    10/26/17 0900  predniSONE tablet 20 mg  " (methylprednisolone taper panel)      -- Oral Daily 10/20/17 0346    10/25/17 0900  methylPREDNISolone sodium succinate injection 20 mg  (methylprednisolone taper panel)      10/26 0859 IV Every 12 hours 10/20/17 0346    10/24/17 0900  methylPREDNISolone sodium succinate injection 40 mg  (methylprednisolone taper panel)      10/25 0859 IV Every 12 hours 10/20/17 0346    10/23/17 0900  methylPREDNISolone sodium succinate injection 60 mg  (methylprednisolone taper panel)      10/24 0859 IV Every 12 hours 10/20/17 0346        Pressors:    Autonomic Drugs     None        Hyperglycemia/Diabetes Medications: Antihyperglycemics     Start     Stop Route Frequency Ordered    10/21/17 1756  insulin aspart pen 0-5 Units      -- SubQ As needed (PRN) 10/21/17 5716

## 2017-10-23 NOTE — PLAN OF CARE
Problem: Patient Care Overview  Goal: Plan of Care Review  Outcome: Ongoing (interventions implemented as appropriate)  Pt on 6L High-Flow NC ; sats %. VSS. A&Ox4 and following commands; however, has intermittent episodes of confusion/says things that do not make sense. Free from fall/injury throughout shift. Turned q2h to prevent skin breakdown. CRRT held to monitor pt's renal function. IRISH drains with minimal serosanguinous output. Garcia in place; no UOP. Accuchecks monitored; no insulin coverage required. Labs monitored. Groin site with no hematoma/bruising development overnight. Mother remains at bedside. Plan is to wean O2 and get pt OOB to chair as appropriate. Plan of care reviewed with pt and mother. All questions and concerns addressed. See flowsheet for full assessment details.

## 2017-10-23 NOTE — PROGRESS NOTES
Acute HD bedside tx initiated via RIJ CVC, lines reversed due to art port with moderate resistance. BFR only 200 obtainable, machine alarms every 1-2 minutes high art pressures >300. Orders reviewed and machine settings verified.

## 2017-10-23 NOTE — PROGRESS NOTES
[facility]  Transplant Surgery  Progress Note      Hospital Day Hospital Day: 27  Post-Op Day 4 Days Post-Op    Admit Diagnosis: ESLD  Procedures: OLTx    ORGAN:   LIVER  Disease Etiology: Acute Alcoholic Hepatitis  Donor Type:    - Brain Death  CDC High Risk:   No  Donor CMV Status:   Donor CMV Status: Positive  Donor HBcAB:   Negative  Donor HCV Status:   Negative  Whole or Partial: Whole Liver  Biliary Anastomosis: End to End  Arterial Anatomy: Standard      Subjective:     Interval History:   No acute events last night. CRRT was held beginning yesterday morning. Sats have been adequate on 6L HFNC and weaned to 3L. Pain well controlled. Some confusion persists but he is easily reoriented and his mother thinks the confusing is improving. Out of bed to chair this morning. No urine output. Tolerating CLD. Didn't sleep much last night but slept most of day yesterday.     Medications:  Continuous Infusions:     Scheduled Meds:   sodium chloride 0.9%   Intravenous Once    famotidine (PF)  20 mg Intravenous Q12H    fluconazole (DIFLUCAN) IVPB  200 mg Intravenous Q24H    heparin (porcine)  5,000 Units Subcutaneous Q8H    hydrALAZINE  10 mg Intravenous Once    meropenem (MERREM) IVPB  1 g Intravenous Q8H    methylPREDNISolone sodium succinate  60 mg Intravenous Q12H    Followed by    [START ON 10/24/2017] methylPREDNISolone sodium succinate  40 mg Intravenous Q12H    Followed by    [START ON 10/25/2017] methylPREDNISolone sodium succinate  20 mg Intravenous Q12H    Followed by    [START ON 10/26/2017] predniSONE  20 mg Oral Daily    mupirocin  1 g Nasal BID    mycophenolate mofetil  1,000 mg Oral BID    quetiapine  75 mg Oral QHS    tacrolimus  1 mg Oral BID    [START ON 10/25/2017] valganciclovir 50 mg/ml  100 mg Per NG tube Every Mon, Wed, Fri    valganciclovir 50 mg/ml  450 mg Oral Once     PRN Meds:sodium chloride, sodium chloride 0.9%, dextrose 50%, dextrose 50%, glucagon (human  recombinant), glucose, glucose, HYDROmorphone, insulin aspart, oxycodone, oxycodone     Objective:     Vital Signs (Most Recent):  Temp: 97.6 °F (36.4 °C) (10/23/17 0701)  Pulse: 91 (10/23/17 0900)  Resp: 18 (10/21/17 1700)  BP: 120/70 (10/23/17 0900)  SpO2: 99 % (10/23/17 0900) Vital Signs (24h Range):  Temp:  [97.4 °F (36.3 °C)-97.8 °F (36.6 °C)] 97.6 °F (36.4 °C)  Pulse:  [] 91  SpO2:  [95 %-100 %] 99 %  BP: (106-143)/(70-86) 120/70  Arterial Line BP: (115-169)/(56-94) 137/68       Intake/Output Summary (Last 24 hours) at 10/23/17 1011  Last data filed at 10/23/17 0900   Gross per 24 hour   Intake              718 ml   Output             1185 ml   Net             -467 ml       Neuro:  GCS: 14  Sedation:  yes     Restraints:  yes  Neurologic: Mental status: follows commands, easily reoriented but some intermittent confusion    Cardiac:      Temp:  [97.4 °F (36.3 °C)-97.8 °F (36.6 °C)]   Pulse:  []   BP: (106-143)/(70-86)   SpO2:  [95 %-100 %]   Arterial Line BP: (115-169)/(56-94)   Heart: regular rate and rhythm, S1, S2 normal, no murmur, rub or gallop  Pulses: 2+ and symmetric    Pulmonary:     Recent Labs  Lab 10/22/17  1523   PH 7.463*   PO2 121*   PCO2 41.3   HCO3 29.6*   BE 6       Lungs:  Clear to auscultation bilaterally  Chest X-ray: pulm edema    Renal:   I & O (Last 24H):    Intake/Output Summary (Last 24 hours) at 10/23/17 1011  Last data filed at 10/23/17 0900   Gross per 24 hour   Intake              718 ml   Output             1185 ml   Net             -467 ml       Recent Labs  Lab 10/22/17  2030 10/23/17  0500 10/23/17  0813   BUN 21* 26* 28*  28*   CREATININE 1.9* 2.2* 2.4*  2.4*   K 4.6 4.8 4.6  4.6       Recent Labs  Lab 10/22/17  2030 10/23/17  0500 10/23/17  0813    137 137  137   K 4.6 4.8 4.6  4.6    104 104  104   CO2 24 24 25  25   CALCIUM 9.2 8.7 8.7  8.7   * 113* 116*  116*       Recent Labs  Lab 10/21/17  1359 10/21/17  2200 10/21/17  2330  10/22/17  0400 10/22/17  0809   PHOS 3.1 3.3  3.3  --  3.2  3.2  --    MG 1.9  --  2.7*  2.7*  --  2.5  2.5       FEN/GI    Recent Labs  Lab 10/22/17  2030 10/23/17  0500 10/23/17  0813   BILITOT 8.7* 8.0* 7.9*  7.9*   *  158* 133* 124*  124*  124*   ALT 97* 75* 73*  73*   ALKPHOS 92 98 104  104   ALBUMIN 1.8* 1.9* 1.9*  1.9*       Recent Labs  Lab 10/22/17  2030 10/23/17  0508 10/23/17  0813   INR 1.1 1.1 1.1      Nutrition: PO: Clear Liquid  Abdomen: expected post-operative tenderness and chevron with no drsg soakthrough, serosang drain output  GI ulcer Prophylaxis: yes    HEME:     Recent Labs  Lab 10/23/17  0130 10/23/17  0508 10/23/17  0813   WBC 12.35 8.79 7.66   HGB 8.1* 7.3* 7.3*   HCT 23.3* 21.4* 21.2*   PLT 63* 58* 48*     DVT Prophylaxis: Pharmacologic yes  Mechanical: yes     Infectious Disease:  Temp:  [97.4 °F (36.3 °C)-97.8 °F (36.6 °C)]   Pulse:  []   BP: (106-143)/(70-86)   SpO2:  [95 %-100 %]   Arterial Line BP: (115-169)/(56-94)   Antibiotics     Start     Stop Route Frequency Ordered    10/20/17 0900  mupirocin 2 % ointment 1 g      10/25 0859 Nasl 2 times daily 10/20/17 0346    10/19/17 2100  meropenem-0.9% sodium chloride 1 g/50 mL IVPB      -- IV Every 8 hours (non-standard times) 10/19/17 2012            Significant Diagnostics:  US tomorrow    Assessment/Plan:   Jhonny Diana is a 27 y.o. male s/p OLTx for acute alcoholic hepatitis.    Neuro: Post-operative pain  - Continue current pain control regimen- oxycodone and dilaudid for breakthrough  - OOBTC, prevent day/night reversal - only can take one nap today! Otherwise out of bed in chair, blinds up, lights and TV on during day. Lights off and TV off at night.  - seroquel nightly    Resp:  - Extubated 10/21/17  - Minimize supplemental O2 requirements  - Encourage IS, deep breathing, cough    CV:  - HDS  - No current vasopressor requirement  - prn for hypertension, unless sustained in which case will start  nifedipine    Heme: Anemia  - Hgb/Hct slowly downtrending, this morning down to 7.3 from 9.5 yesterday. Will plan to transfuse 1 U pRBCs to be given with HD or CRRT today  - Continue to trend    ID:  - on meropenem from Kleb in sputum 1/6, check G6PD for Bactrim allergy (SJS hx?) (dapsone would be approp,if not will start atorvaquine), diflucan  - will speak with ID today re: when to d/c alena  - note: there were a few serosal tears due to dense adhesions, which were over sewn  - cultures yesterday NGTD    Immuno: Immunocompromised state  - Continue current immunosuppression regimen  - Daily Prograf level  - Continue opportunistic infection prophylaxis (discussed no Bactrim)    Renal:  - d/c chicas and serial bladder scan  - Strict I/Os  - appreciate nephrology recs: preferably HD trial today, if not CRRT  - Trend CVP    FEN/GI: ESLD s/p OLT, transaminitis, protein calorie malnutrition  - Liver U/S today  - d/c abdominal drains  - Trend LFTs  - Replace lytes PRN    Endo: Hyperglycemia  - Endocrine following, appreciate assistance  - Accuchecks  - Insulin gtt weaned off per endocrine    Dispo: SICU today, OOBTC, HD trial, stepdown later this afternoon or tomorrow

## 2017-10-23 NOTE — PROGRESS NOTES
Dialysis      Cath denis was aspirated. Cath was flushed tx initiated. Blood flow 150 ml/min @ -200 pressure, reversed lumens blood flow 250 ml/min @-140 art pressure. Continue tx as prescribed,

## 2017-10-23 NOTE — ASSESSMENT & PLAN NOTE
S/p Liver transplant and HD during surgery    - likely ischemic ATN from decreased renal perfusion (volume shifts from paracentesis, anemia)  - urine sediment with muddy brown casts  - Last dialysis treatment was on Saturday and Sunday SLED was hold   - Serum creatinine worsened compared with yesterday from 1.9-->2.2  - Patient continues to be anuric since 2 days ago   - Will have dialysis today for removal of excess fluid and toxins, duration of 3 hrs and UF 1-2 L as tolerated by patient, maintain MAP >65   - Patient presented this morning with nasal canula 2 L but has been progressed to face mask 50%  - Will reassess tomorrow if any need of treatment

## 2017-10-23 NOTE — PROGRESS NOTES
[facility]  Nephrology  Progress Note    Patient Name: Jhonny Diana  MRN: [id number]  Admission Date: 9/27/2017  Hospital Length of Stay: 26 days  Attending Provider: Solis Chapa MD   Primary Care Physician: Provider Notinsystem  Principal Problem:Liver replaced by transplant    Subjective:     HPI: Jhonny Diana is a 26 yo male with pMHX of childhood asthma and alcohol abuse who was was transferred to [facility] for hepatology evaluation for worsening liver functions.  Information was obtained from family at bedside and review of outside records, given that patient is obtunded.  According to mother at bedside, patient has had multiple admissions to the hospital over the past 6 months for recurrent abdominal pain.  She reports that he has been been seen the ED and has been diagnosed with diverticulitis, UTIs, gastroenteritis, and cholecystitis.  He was never admitted to the hospital and was always discharged.  On 09/06, patient presented to OSH for worsening abdominal pain and distension.  He was diagnosed at that time with ETOH hepatitis.  He was treated with steroids, ceftriaxone for SBP prophylaxis, and diuresed at that time for volume management.  He was discharged home on the 15th, after clearance from GI,a nd was instructed to follow-up with his PCP 1 week after discharge.  He presented to the hospital on 09/25 for worsening abdominal pain and distension with 20 kg weight gain.  He was transferred to [facility] for further care and Nephrology was consulted for LAURA.    On review of outside records, patient Scr on admission was 1.5 (with baseline at 1.0).  According to the family at the bedside, he was not urinating well at the OSH, and not responding to diuretics (spirionolactone/lasix combo).  His mother reports that patient continued to c/o abdominal and inability to urinate, but no chicas was placed until transfer to [facility] yesterday.  Record reviews shows relative hypotension with BPs in the low 90's.  His  mother reports that the nursing staff was administrating his narcotic regimen based on his blood pressures, and at times, medication was held due to hypotension.  Since admission to hospital, his UOP has improved, responding well in Lasix 40 IV (770 ml since admission, 700 ml so far this shift at time of consultation).  Admit labs revealed a SCr of 2.6, this morning improved down to 2.4.  Bilirubin remains elevated, >14.  Abdomen remains with tight distension, unable to perform paracentesis, as no significant fluid pocket found.  Mother reports patient did not have para's done at OSH.    Labs on the morning of 09/27 with bicarb of 15.  ABG with alkalemia and primary respiratory alkalosis and anion gap acidosis.     Interval History:   Patient evaluated at bedside, hemodynamically stable, not dependent of pressors no more, no respiratory distress with nasal canula. Intake 478 cc and output 1,4L. Patient is anuric.    Review of patient's allergies indicates:   Allergen Reactions    Bactrim [sulfamethoxazole-trimethoprim] Other (See Comments)     Mookie Trell Syndrome     Current Facility-Administered Medications   Medication Frequency    0.9%  NaCl infusion (for blood administration) Q24H PRN    0.9%  NaCl infusion PRN    0.9%  NaCl infusion Once    dextrose 50% injection 12.5 g PRN    dextrose 50% injection 25 g PRN    famotidine (PF) injection 20 mg Q12H    fluconazole (DIFLUCAN) IVPB 200 mg 100 mL Q24H    glucagon (human recombinant) injection 1 mg PRN    glucose chewable tablet 16 g PRN    glucose chewable tablet 24 g PRN    heparin (porcine) injection 5,000 Units Q8H    hydrALAZINE injection 10 mg Once    hydromorphone injection 0.5 mg Q6H PRN    insulin aspart pen 0-5 Units QID (AC + HS) PRN    methylPREDNISolone sodium succinate injection 60 mg Q12H    Followed by    [START ON 10/24/2017] methylPREDNISolone sodium succinate injection 40 mg Q12H    Followed by    [START ON 10/25/2017]  methylPREDNISolone sodium succinate injection 20 mg Q12H    Followed by    [START ON 10/26/2017] predniSONE tablet 20 mg Daily    mupirocin 2 % ointment 1 g BID    mycophenolate mofetil 200 mg/mL suspension 1,000 mg BID    oxycodone immediate release tablet 10 mg Q6H PRN    oxycodone immediate release tablet 5 mg Q6H PRN    quetiapine tablet 75 mg QHS    tacrolimus capsule 1 mg BID    [START ON 10/25/2017] valganciclovir 50 mg/ml oral solution 100 mg Every Mon, Wed, Fri       Objective:     Vital Signs (Most Recent):  Temp: 97.5 °F (36.4 °C) (10/23/17 1500)  Pulse: 83 (10/23/17 1600)  Resp: 18 (10/21/17 1700)  BP: (!) 128/90 (10/23/17 1600)  SpO2: 100 % (10/23/17 1600)  O2 Device (Oxygen Therapy): High Flow nasal Cannula (10/23/17 1600) Vital Signs (24h Range):  Temp:  [97.4 °F (36.3 °C)-97.8 °F (36.6 °C)] 97.5 °F (36.4 °C)  Pulse:  [81-98] 83  SpO2:  [97 %-100 %] 100 %  BP: (106-138)/(67-90) 128/90  Arterial Line BP: (115-156)/(56-86) 147/75     Weight: 87.8 kg (193 lb 9 oz) (10/23/17 0500)  Body mass index is 30.32 kg/m².  Body surface area is 2.04 meters squared.    I/O last 3 completed shifts:  In: 3419 [P.O.:120; I.V.:3299]  Out: 6140 [Drains:1325; Other:4815]    Physical Exam   Constitutional: He is oriented to person, place, and time. He appears well-developed and well-nourished. He is easily aroused. No distress. He is sedated and not intubated.   HENT:   Head: Normocephalic and atraumatic.   Eyes: Conjunctivae and EOM are normal. Pupils are equal, round, and reactive to light. Scleral icterus is present.   Neck: Trachea normal and normal range of motion. Neck supple. No JVD present.   Cardiovascular: Normal rate, regular rhythm, S1 normal, S2 normal, normal heart sounds, intact distal pulses and normal pulses.  Exam reveals no gallop and no friction rub.    No murmur heard.  Upper and lower ptting edema   Pulmonary/Chest: Effort normal and breath sounds normal. He is not intubated.   Abdominal:  Soft. Bowel sounds are normal. He exhibits distension.   Musculoskeletal: Normal range of motion. He exhibits edema. He exhibits no deformity.   Neurological: He is alert, oriented to person, place, and time and easily aroused.   Skin: Skin is warm and dry. Capillary refill takes less than 2 seconds. He is not diaphoretic.   Psychiatric: He has a normal mood and affect. His behavior is normal.   Vitals reviewed.      Significant Labs:  BMP:   Recent Labs  Lab 10/22/17  0809  10/23/17  0813   GLU  --   < > 116*  116*   CL  --   < > 104  104   CO2  --   < > 25  25   BUN  --   < > 28*  28*   CREATININE  --   < > 2.4*  2.4*   CALCIUM  --   < > 8.7  8.7   MG 2.5  2.5  --   --    < > = values in this interval not displayed.  CBC:   Recent Labs  Lab 10/23/17  1600   WBC 8.58   RBC 2.33*   HGB 7.1*   HCT 20.1*   PLT 51*   MCV 86   MCH 30.5   MCHC 35.3     CMP:   Recent Labs  Lab 10/23/17  0813   *  116*   CALCIUM 8.7  8.7   ALBUMIN 1.9*  1.9*   PROT 3.9*  3.9*     137   K 4.6  4.6   CO2 25  25     104   BUN 28*  28*   CREATININE 2.4*  2.4*   ALKPHOS 104  104   ALT 73*  73*   *  124*  124*   BILITOT 7.9*  7.9*     All labs within the past 24 hours have been reviewed.       Assessment/Plan:     LAURA (acute kidney injury)    S/p Liver transplant and HD during surgery    - likely ischemic ATN from decreased renal perfusion (volume shifts from paracentesis, anemia)  - urine sediment with muddy brown casts  - Last dialysis treatment was on Saturday and Sunday SLED was hold   - Serum creatinine worsened compared with yesterday from 1.9-->2.2  - Patient continues to be anuric since 2 days ago   - Will have dialysis today for removal of excess fluid and toxins, duration of 3 hrs and UF 1-2 L as tolerated by patient, maintain MAP >65   - Patient presented this morning with nasal canula 2 L but has been progressed to face mask 50%  - Will reassess tomorrow if any need of treatment              Win Sandoval  Nephrology  Fellow  [facility]    Pager [phone]    ATTENDING PHYSICIAN ATTESTATION  I have personally interviewed and examined the patient. I thoroughly reviewed the demographic, clinical, laboratorial and imaging information available in medical records. I agree with the assessment and recommendations provided by the subspecialty resident. Dr. Gentile was under my supervision.     HEMODIALYSIS NOTE  Patient evaluated while undergoing hemodialysis indicated for unresolved LAURA. Tolerating session with current UFR, no complications.

## 2017-10-23 NOTE — PROGRESS NOTES
HD d/c for now, blood returned. Cathflo instilled to [facility] trialysis ports per protocol.

## 2017-10-23 NOTE — ASSESSMENT & PLAN NOTE
Estimated Creatinine Clearance: 48.5 mL/min (based on SCr of 2.4 mg/dL (H)).  Avoid insulin stacking  CRRT per nephrology

## 2017-10-23 NOTE — ASSESSMENT & PLAN NOTE
BG goal 140-180.   Start low dose correction with AC/hs bg monitoring     Discharge planning: on-going

## 2017-10-23 NOTE — SUBJECTIVE & OBJECTIVE
Interval History:   Patient evaluated at bedside, hemodynamically stable, not dependent of pressors no more, no respiratory distress with nasal canula. Intake 478 cc and output 1,4L. Patient is anuric.    Review of patient's allergies indicates:   Allergen Reactions    Bactrim [sulfamethoxazole-trimethoprim] Other (See Comments)     Mookie Trell Syndrome     Current Facility-Administered Medications   Medication Frequency    0.9%  NaCl infusion (for blood administration) Q24H PRN    0.9%  NaCl infusion PRN    0.9%  NaCl infusion Once    dextrose 50% injection 12.5 g PRN    dextrose 50% injection 25 g PRN    famotidine (PF) injection 20 mg Q12H    fluconazole (DIFLUCAN) IVPB 200 mg 100 mL Q24H    glucagon (human recombinant) injection 1 mg PRN    glucose chewable tablet 16 g PRN    glucose chewable tablet 24 g PRN    heparin (porcine) injection 5,000 Units Q8H    hydrALAZINE injection 10 mg Once    hydromorphone injection 0.5 mg Q6H PRN    insulin aspart pen 0-5 Units QID (AC + HS) PRN    methylPREDNISolone sodium succinate injection 60 mg Q12H    Followed by    [START ON 10/24/2017] methylPREDNISolone sodium succinate injection 40 mg Q12H    Followed by    [START ON 10/25/2017] methylPREDNISolone sodium succinate injection 20 mg Q12H    Followed by    [START ON 10/26/2017] predniSONE tablet 20 mg Daily    mupirocin 2 % ointment 1 g BID    mycophenolate mofetil 200 mg/mL suspension 1,000 mg BID    oxycodone immediate release tablet 10 mg Q6H PRN    oxycodone immediate release tablet 5 mg Q6H PRN    quetiapine tablet 75 mg QHS    tacrolimus capsule 1 mg BID    [START ON 10/25/2017] valganciclovir 50 mg/ml oral solution 100 mg Every Mon, Wed, Fri       Objective:     Vital Signs (Most Recent):  Temp: 97.5 °F (36.4 °C) (10/23/17 1500)  Pulse: 83 (10/23/17 1600)  Resp: 18 (10/21/17 1700)  BP: (!) 128/90 (10/23/17 1600)  SpO2: 100 % (10/23/17 1600)  O2 Device (Oxygen Therapy): High Flow nasal  Cannula (10/23/17 1600) Vital Signs (24h Range):  Temp:  [97.4 °F (36.3 °C)-97.8 °F (36.6 °C)] 97.5 °F (36.4 °C)  Pulse:  [81-98] 83  SpO2:  [97 %-100 %] 100 %  BP: (106-138)/(67-90) 128/90  Arterial Line BP: (115-156)/(56-86) 147/75     Weight: 87.8 kg (193 lb 9 oz) (10/23/17 0500)  Body mass index is 30.32 kg/m².  Body surface area is 2.04 meters squared.    I/O last 3 completed shifts:  In: 3419 [P.O.:120; I.V.:3299]  Out: 6140 [Drains:1325; Other:4815]    Physical Exam   Constitutional: He is oriented to person, place, and time. He appears well-developed and well-nourished. He is easily aroused. No distress. He is sedated and not intubated.   HENT:   Head: Normocephalic and atraumatic.   Eyes: Conjunctivae and EOM are normal. Pupils are equal, round, and reactive to light. Scleral icterus is present.   Neck: Trachea normal and normal range of motion. Neck supple. No JVD present.   Cardiovascular: Normal rate, regular rhythm, S1 normal, S2 normal, normal heart sounds, intact distal pulses and normal pulses.  Exam reveals no gallop and no friction rub.    No murmur heard.  Upper and lower ptting edema   Pulmonary/Chest: Effort normal and breath sounds normal. He is not intubated.   Abdominal: Soft. Bowel sounds are normal. He exhibits distension.   Musculoskeletal: Normal range of motion. He exhibits edema. He exhibits no deformity.   Neurological: He is alert, oriented to person, place, and time and easily aroused.   Skin: Skin is warm and dry. Capillary refill takes less than 2 seconds. He is not diaphoretic.   Psychiatric: He has a normal mood and affect. His behavior is normal.   Vitals reviewed.      Significant Labs:  BMP:   Recent Labs  Lab 10/22/17  0809  10/23/17  0813   GLU  --   < > 116*  116*   CL  --   < > 104  104   CO2  --   < > 25  25   BUN  --   < > 28*  28*   CREATININE  --   < > 2.4*  2.4*   CALCIUM  --   < > 8.7  8.7   MG 2.5  2.5  --   --    < > = values in this interval not  displayed.  CBC:   Recent Labs  Lab 10/23/17  1600   WBC 8.58   RBC 2.33*   HGB 7.1*   HCT 20.1*   PLT 51*   MCV 86   MCH 30.5   MCHC 35.3     CMP:   Recent Labs  Lab 10/23/17  0813   *  116*   CALCIUM 8.7  8.7   ALBUMIN 1.9*  1.9*   PROT 3.9*  3.9*     137   K 4.6  4.6   CO2 25  25     104   BUN 28*  28*   CREATININE 2.4*  2.4*   ALKPHOS 104  104   ALT 73*  73*   *  124*  124*   BILITOT 7.9*  7.9*     All labs within the past 24 hours have been reviewed.

## 2017-10-23 NOTE — PROGRESS NOTES
"[facility]  Endocrinology  Progress Note    Admit Date: 9/27/2017     Reason for Consult: Management of Hyperglycemia     Surgical Procedure and Date: OLT 10/20/2017    HPI: Patient is a 27 y.o. 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 No personal or family h/o DM. Endocrine consulted for BG management.     Interval HPI:   Extubated on high flow NC, no hypoglycemia,no current insulin needs  Renal diet ordered this am  Afebrile    /70 (BP Location: Left arm, Patient Position: Lying)   Pulse 91   Temp 97.6 °F (36.4 °C) (Oral)   Resp 18   Ht 5' 7" (1.702 m)   Wt 87.8 kg (193 lb 9 oz)   SpO2 99%   BMI 30.32 kg/m²       Labs Reviewed and Include      Recent Labs  Lab 10/23/17  0813   *  116*   CALCIUM 8.7  8.7   ALBUMIN 1.9*  1.9*   PROT 3.9*  3.9*     137   K 4.6  4.6   CO2 25  25     104   BUN 28*  28*   CREATININE 2.4*  2.4*   ALKPHOS 104  104   ALT 73*  73*   *  124*  124*   BILITOT 7.9*  7.9*     Lab Results   Component Value Date    WBC 7.66 10/23/2017    HGB 7.3 (L) 10/23/2017    HCT 21.2 (L) 10/23/2017    MCV 88 10/23/2017    PLT 48 (L) 10/23/2017     No results for input(s): TSH, FREET4 in the last 168 hours.  Lab Results   Component Value Date    HGBA1C <4.0 (A) 10/18/2017       Nutritional status:   Body mass index is 30.32 kg/m².  Lab Results   Component Value Date    ALBUMIN 1.9 (L) 10/23/2017    ALBUMIN 1.9 (L) 10/23/2017    ALBUMIN 1.9 (L) 10/23/2017     Lab Results   Component Value Date    PREALBUMIN 5 (L) 10/11/2017       Estimated Creatinine Clearance: 48.5 mL/min (based on SCr of 2.4 mg/dL (H)).    Accu-Checks  Recent Labs      10/21/17   1955  10/21/17   2333  10/22/17   0358  10/22/17   0818  10/22/17   1231  10/22/17   1624  10/22/17   2031  10/23/17   0140  10/23/17   0505  10/23/17   0819   POCTGLUCOSE  123*  131*  138*  103  119*  131*  124*  137*  120*  123*       Current " Medications and/or Treatments Impacting Glycemic Control  Immunotherapy:  Immunosuppressants         Stop Route Frequency     tacrolimus capsule 1 mg      -- Oral 2 times daily     mycophenolate mofetil 200 mg/mL suspension 1,000 mg      -- Oral 2 times daily        Steroids:   Hormones     Start     Stop Route Frequency Ordered    10/26/17 0900  predniSONE tablet 20 mg  (methylprednisolone taper panel)      -- Oral Daily 10/20/17 0346    10/25/17 0900  methylPREDNISolone sodium succinate injection 20 mg  (methylprednisolone taper panel)      10/26 0859 IV Every 12 hours 10/20/17 0346    10/24/17 0900  methylPREDNISolone sodium succinate injection 40 mg  (methylprednisolone taper panel)      10/25 0859 IV Every 12 hours 10/20/17 0346    10/23/17 0900  methylPREDNISolone sodium succinate injection 60 mg  (methylprednisolone taper panel)      10/24 0859 IV Every 12 hours 10/20/17 0346        Pressors:    Autonomic Drugs     None        Hyperglycemia/Diabetes Medications: Antihyperglycemics     Start     Stop Route Frequency Ordered    10/21/17 1756  insulin aspart pen 0-5 Units      -- SubQ As needed (PRN) 10/21/17 1657          ASSESSMENT and PLAN    * Liver transplant 10/19/2017 for acute alcoholic hepatitis    Per LTS          Hyperglycemia    BG goal 140-180.   Start low dose correction with AC/hs bg monitoring     Discharge planning: on-going         Immunosuppression    On immunosuppression.   Will begin standard steroid taper 10/21/17.  Increases insulin needs.         Obesity due to excess calories    Increases insulin resistance.  Body mass index is 30.32 kg/m².        Liver transplanted    Avoid hypoglycemia.           Prophylactic immunotherapy      May increase insulin resistance         LAURA (acute kidney injury)    Estimated Creatinine Clearance: 48.5 mL/min (based on SCr of 2.4 mg/dL (H)).  Avoid insulin stacking  CRRT per nephrology               Jazmine Bejarano, DNP, NP  Endocrinology  [facility]

## 2017-10-24 PROBLEM — K70.11 ALCOHOLIC HEPATITIS WITH ASCITES: Status: ACTIVE | Noted: 2017-10-24

## 2017-10-24 LAB
ALBUMIN SERPL BCP-MCNC: 1.9 G/DL
ALP SERPL-CCNC: 172 U/L
ALT SERPL W/O P-5'-P-CCNC: 56 U/L
ANION GAP SERPL CALC-SCNC: 8 MMOL/L
ANISOCYTOSIS BLD QL SMEAR: SLIGHT
APTT BLDCRRT: 28.6 SEC
AST SERPL-CCNC: 110 U/L
BASOPHILS # BLD AUTO: 0.01 K/UL
BASOPHILS # BLD AUTO: 0.01 K/UL
BASOPHILS NFR BLD: 0.1 %
BASOPHILS NFR BLD: 0.1 %
BILIRUB DIRECT SERPL-MCNC: 5.3 MG/DL
BILIRUB SERPL-MCNC: 6.8 MG/DL
BLD PROD TYP BPU: NORMAL
BLOOD UNIT EXPIRATION DATE: NORMAL
BLOOD UNIT TYPE CODE: 6200
BLOOD UNIT TYPE: NORMAL
BUN SERPL-MCNC: 33 MG/DL
CALCIUM SERPL-MCNC: 7.8 MG/DL
CHLORIDE SERPL-SCNC: 103 MMOL/L
CO2 SERPL-SCNC: 23 MMOL/L
CODING SYSTEM: NORMAL
CREAT SERPL-MCNC: 2.5 MG/DL
DIFFERENTIAL METHOD: ABNORMAL
DIFFERENTIAL METHOD: ABNORMAL
DISPENSE STATUS: NORMAL
EOSINOPHIL # BLD AUTO: 0 K/UL
EOSINOPHIL # BLD AUTO: 0 K/UL
EOSINOPHIL NFR BLD: 0 %
EOSINOPHIL NFR BLD: 0.1 %
ERYTHROCYTE [DISTWIDTH] IN BLOOD BY AUTOMATED COUNT: 20.6 %
ERYTHROCYTE [DISTWIDTH] IN BLOOD BY AUTOMATED COUNT: 20.7 %
EST. GFR  (AFRICAN AMERICAN): 38.9 ML/MIN/1.73 M^2
EST. GFR  (NON AFRICAN AMERICAN): 33.7 ML/MIN/1.73 M^2
GGT SERPL-CCNC: 379 U/L
GLUCOSE SERPL-MCNC: 111 MG/DL
HCT VFR BLD AUTO: 24.5 %
HCT VFR BLD AUTO: 25.5 %
HGB BLD-MCNC: 8.6 G/DL
HGB BLD-MCNC: 8.8 G/DL
IMM GRANULOCYTES # BLD AUTO: 0.11 K/UL
IMM GRANULOCYTES # BLD AUTO: 0.15 K/UL
IMM GRANULOCYTES NFR BLD AUTO: 1.3 %
IMM GRANULOCYTES NFR BLD AUTO: 1.5 %
INR PPP: 1
LYMPHOCYTES # BLD AUTO: 0.7 K/UL
LYMPHOCYTES # BLD AUTO: 0.7 K/UL
LYMPHOCYTES NFR BLD: 6.4 %
LYMPHOCYTES NFR BLD: 9 %
MCH RBC QN AUTO: 29.9 PG
MCH RBC QN AUTO: 30.7 PG
MCHC RBC AUTO-ENTMCNC: 34.5 G/DL
MCHC RBC AUTO-ENTMCNC: 35.1 G/DL
MCV RBC AUTO: 87 FL
MCV RBC AUTO: 88 FL
MONOCYTES # BLD AUTO: 0.3 K/UL
MONOCYTES # BLD AUTO: 0.4 K/UL
MONOCYTES NFR BLD: 3.7 %
MONOCYTES NFR BLD: 4.1 %
NEUTROPHILS # BLD AUTO: 10.1 K/UL
NEUTROPHILS # BLD AUTO: 6.2 K/UL
NEUTROPHILS NFR BLD: 85.3 %
NEUTROPHILS NFR BLD: 88.4 %
NRBC BLD-RTO: 1 /100 WBC
NRBC BLD-RTO: 1 /100 WBC
PLATELET # BLD AUTO: 37 K/UL
PLATELET # BLD AUTO: 40 K/UL
PLATELET BLD QL SMEAR: ABNORMAL
PMV BLD AUTO: 10.7 FL
PMV BLD AUTO: 12.1 FL
POCT GLUCOSE: 138 MG/DL (ref 70–110)
POCT GLUCOSE: 97 MG/DL (ref 70–110)
POTASSIUM SERPL-SCNC: 4.1 MMOL/L
PROT SERPL-MCNC: 3.9 G/DL
PROTHROMBIN TIME: 11 SEC
RBC # BLD AUTO: 2.8 M/UL
RBC # BLD AUTO: 2.94 M/UL
SODIUM SERPL-SCNC: 134 MMOL/L
TACROLIMUS BLD-MCNC: 8.8 NG/ML
TRANS PLATPHERESIS VOL PATIENT: NORMAL ML
WBC # BLD AUTO: 11.39 K/UL
WBC # BLD AUTO: 7.3 K/UL

## 2017-10-24 PROCEDURE — 85610 PROTHROMBIN TIME: CPT

## 2017-10-24 PROCEDURE — 80053 COMPREHEN METABOLIC PANEL: CPT

## 2017-10-24 PROCEDURE — 97530 THERAPEUTIC ACTIVITIES: CPT

## 2017-10-24 PROCEDURE — 82977 ASSAY OF GGT: CPT

## 2017-10-24 PROCEDURE — P9035 PLATELET PHERES LEUKOREDUCED: HCPCS

## 2017-10-24 PROCEDURE — 99232 SBSQ HOSP IP/OBS MODERATE 35: CPT | Mod: ,,, | Performed by: INTERNAL MEDICINE

## 2017-10-24 PROCEDURE — 80197 ASSAY OF TACROLIMUS: CPT

## 2017-10-24 PROCEDURE — 20000000 HC ICU ROOM

## 2017-10-24 PROCEDURE — 63600175 PHARM REV CODE 636 W HCPCS: Performed by: STUDENT IN AN ORGANIZED HEALTH CARE EDUCATION/TRAINING PROGRAM

## 2017-10-24 PROCEDURE — 63600175 PHARM REV CODE 636 W HCPCS: Performed by: TRANSPLANT SURGERY

## 2017-10-24 PROCEDURE — 02HV33Z INSERTION OF INFUSION DEVICE INTO SUPERIOR VENA CAVA, PERCUTANEOUS APPROACH: ICD-10-PCS | Performed by: TRANSPLANT SURGERY

## 2017-10-24 PROCEDURE — 25000003 PHARM REV CODE 250: Performed by: STUDENT IN AN ORGANIZED HEALTH CARE EDUCATION/TRAINING PROGRAM

## 2017-10-24 PROCEDURE — 99231 SBSQ HOSP IP/OBS SF/LOW 25: CPT | Mod: ,,, | Performed by: NURSE PRACTITIONER

## 2017-10-24 PROCEDURE — 85730 THROMBOPLASTIN TIME PARTIAL: CPT

## 2017-10-24 PROCEDURE — 25000003 PHARM REV CODE 250: Performed by: TRANSPLANT SURGERY

## 2017-10-24 PROCEDURE — 97803 MED NUTRITION INDIV SUBSEQ: CPT

## 2017-10-24 PROCEDURE — 90935 HEMODIALYSIS ONE EVALUATION: CPT

## 2017-10-24 PROCEDURE — 82248 BILIRUBIN DIRECT: CPT

## 2017-10-24 PROCEDURE — 97164 PT RE-EVAL EST PLAN CARE: CPT

## 2017-10-24 PROCEDURE — 85025 COMPLETE CBC W/AUTO DIFF WBC: CPT

## 2017-10-24 RX ORDER — SODIUM CHLORIDE 9 MG/ML
INJECTION, SOLUTION INTRAVENOUS
Status: DISCONTINUED | OUTPATIENT
Start: 2017-10-24 | End: 2017-10-26

## 2017-10-24 RX ORDER — FAMOTIDINE 20 MG/1
20 TABLET, FILM COATED ORAL NIGHTLY
Status: DISCONTINUED | OUTPATIENT
Start: 2017-10-24 | End: 2017-11-08 | Stop reason: HOSPADM

## 2017-10-24 RX ORDER — ERGOCALCIFEROL 1.25 MG/1
50000 CAPSULE ORAL
Status: DISCONTINUED | OUTPATIENT
Start: 2017-10-24 | End: 2017-11-08 | Stop reason: HOSPADM

## 2017-10-24 RX ORDER — HYDROCODONE BITARTRATE AND ACETAMINOPHEN 500; 5 MG/1; MG/1
TABLET ORAL
Status: DISCONTINUED | OUTPATIENT
Start: 2017-10-24 | End: 2017-10-26

## 2017-10-24 RX ORDER — SODIUM CHLORIDE 9 MG/ML
INJECTION, SOLUTION INTRAVENOUS ONCE
Status: DISCONTINUED | OUTPATIENT
Start: 2017-10-24 | End: 2017-10-26

## 2017-10-24 RX ORDER — FLUCONAZOLE 100 MG/1
200 TABLET ORAL DAILY
Status: DISCONTINUED | OUTPATIENT
Start: 2017-10-25 | End: 2017-11-08 | Stop reason: HOSPADM

## 2017-10-24 RX ADMIN — OXYCODONE HYDROCHLORIDE 5 MG: 5 TABLET ORAL at 10:10

## 2017-10-24 RX ADMIN — FLUCONAZOLE 200 MG: 2 INJECTION INTRAVENOUS at 03:10

## 2017-10-24 RX ADMIN — QUETIAPINE FUMARATE 75 MG: 25 TABLET, FILM COATED ORAL at 10:10

## 2017-10-24 RX ADMIN — MUPIROCIN 1 G: 20 OINTMENT TOPICAL at 09:10

## 2017-10-24 RX ADMIN — FAMOTIDINE 20 MG: 20 TABLET, FILM COATED ORAL at 09:10

## 2017-10-24 RX ADMIN — Medication 1000 MG: at 10:10

## 2017-10-24 RX ADMIN — METHYLPREDNISOLONE SODIUM SUCCINATE 40 MG: 40 INJECTION, POWDER, FOR SOLUTION INTRAMUSCULAR; INTRAVENOUS at 10:10

## 2017-10-24 RX ADMIN — HEPARIN SODIUM 5000 UNITS: 5000 INJECTION, SOLUTION INTRAVENOUS; SUBCUTANEOUS at 09:10

## 2017-10-24 RX ADMIN — TACROLIMUS 1 MG: 1 CAPSULE ORAL at 06:10

## 2017-10-24 RX ADMIN — HEPARIN SODIUM 5000 UNITS: 5000 INJECTION, SOLUTION INTRAVENOUS; SUBCUTANEOUS at 02:10

## 2017-10-24 RX ADMIN — ERGOCALCIFEROL 50000 UNITS: 1.25 CAPSULE ORAL at 11:10

## 2017-10-24 RX ADMIN — HEPARIN SODIUM 5000 UNITS: 5000 INJECTION, SOLUTION INTRAVENOUS; SUBCUTANEOUS at 05:10

## 2017-10-24 RX ADMIN — METHYLPREDNISOLONE SODIUM SUCCINATE 40 MG: 40 INJECTION, POWDER, FOR SOLUTION INTRAMUSCULAR; INTRAVENOUS at 09:10

## 2017-10-24 RX ADMIN — TACROLIMUS 1 MG: 1 CAPSULE ORAL at 09:10

## 2017-10-24 NOTE — PROGRESS NOTES
1hr 38mins dialysis tx completed with net UF of 580ml. Unable to finish tx due to catheter issues and high TMP on machine. Dr Gentile informed. Ended tx and returned blood. CVC flushed with saline and secured with cap. Report given to primary RN.

## 2017-10-24 NOTE — ASSESSMENT & PLAN NOTE
S/p Liver transplant and HD during surgery    - likely ischemic ATN from decreased renal perfusion (volume shifts from paracentesis, anemia)  - urine sediment with muddy brown casts  - Last dialysis treatment was on Saturday and Sunday SLED was hold   - Serum creatinine worsened compared with yesterday from 2.2-->2.5  - Patient continues to be anuric since 2 days ago, has only made 5 cc yesterday, no urine today  - Will have dialysis today for removal of excess fluid and toxins, duration of 3 hrs and UF 2-3 L as tolerated by patient, maintain MAP >65   - Patient only had 1.5 hrs of treatment yesterday since presented difficulty with  Filtration, which was changed this morning   - Patient presented this morning with nasal canula 2 L   - Will reassess tomorrow if any need of treatment

## 2017-10-24 NOTE — PROGRESS NOTES
[facility]  Nephrology  Progress Note    Patient Name: Jhonny Diana  MRN: [id number]  Admission Date: 9/27/2017  Hospital Length of Stay: 27 days  Attending Provider: Solis Chapa MD   Primary Care Physician: Provider Notinsystem  Principal Problem:Liver replaced by transplant    Subjective:     HPI: Jhonny Diana is a 28 yo male with pMHX of childhood asthma and alcohol abuse who was was transferred to [facility] for hepatology evaluation for worsening liver functions.  Information was obtained from family at bedside and review of outside records, given that patient is obtunded.  According to mother at bedside, patient has had multiple admissions to the hospital over the past 6 months for recurrent abdominal pain.  She reports that he has been been seen the ED and has been diagnosed with diverticulitis, UTIs, gastroenteritis, and cholecystitis.  He was never admitted to the hospital and was always discharged.  On 09/06, patient presented to OSH for worsening abdominal pain and distension.  He was diagnosed at that time with ETOH hepatitis.  He was treated with steroids, ceftriaxone for SBP prophylaxis, and diuresed at that time for volume management.  He was discharged home on the 15th, after clearance from GI,a nd was instructed to follow-up with his PCP 1 week after discharge.  He presented to the hospital on 09/25 for worsening abdominal pain and distension with 20 kg weight gain.  He was transferred to [facility] for further care and Nephrology was consulted for LAURA.    On review of outside records, patient Scr on admission was 1.5 (with baseline at 1.0).  According to the family at the bedside, he was not urinating well at the OSH, and not responding to diuretics (spirionolactone/lasix combo).  His mother reports that patient continued to c/o abdominal and inability to urinate, but no chicas was placed until transfer to [facility] yesterday.  Record reviews shows relative hypotension with BPs in the low 90's.  His  mother reports that the nursing staff was administrating his narcotic regimen based on his blood pressures, and at times, medication was held due to hypotension.  Since admission to hospital, his UOP has improved, responding well in Lasix 40 IV (770 ml since admission, 700 ml so far this shift at time of consultation).  Admit labs revealed a SCr of 2.6, this morning improved down to 2.4.  Bilirubin remains elevated, >14.  Abdomen remains with tight distension, unable to perform paracentesis, as no significant fluid pocket found.  Mother reports patient did not have para's done at OSH.    Labs on the morning of 09/27 with bicarb of 15.  ABG with alkalemia and primary respiratory alkalosis and anion gap acidosis.     Interval History:   Patient evaluated at bedside, hemodynamically stable, no respiratory distress, had problems with cath during dialysis yesterday in which was replaced today. Intake 1.7 L, urinary output 5 cc total 1.4 L     Review of patient's allergies indicates:   Allergen Reactions    Bactrim [sulfamethoxazole-trimethoprim] Other (See Comments)     Mookie Trell Syndrome     Current Facility-Administered Medications   Medication Frequency    0.9%  NaCl infusion (for blood administration) Q24H PRN    0.9%  NaCl infusion (for blood administration) Q24H PRN    0.9%  NaCl infusion (for blood administration) Q24H PRN    0.9%  NaCl infusion PRN    0.9%  NaCl infusion Once    dextrose 50% injection 12.5 g PRN    dextrose 50% injection 25 g PRN    ergocalciferol capsule 50,000 Units Q7 Days    famotidine tablet 20 mg QHS    [START ON 10/25/2017] fluconazole tablet 200 mg Daily    glucagon (human recombinant) injection 1 mg PRN    glucose chewable tablet 16 g PRN    glucose chewable tablet 24 g PRN    heparin (porcine) injection 5,000 Units Q8H    hydrALAZINE injection 10 mg Once    hydromorphone injection 0.5 mg Q6H PRN    insulin aspart pen 0-5 Units QID (AC + HS) PRN     methylPREDNISolone sodium succinate injection 40 mg Q12H    Followed by    [START ON 10/25/2017] methylPREDNISolone sodium succinate injection 20 mg Q12H    Followed by    [START ON 10/26/2017] predniSONE tablet 20 mg Daily    mupirocin 2 % ointment 1 g BID    mycophenolate mofetil 200 mg/mL suspension 1,000 mg BID    oxycodone immediate release tablet 10 mg Q6H PRN    oxycodone immediate release tablet 5 mg Q6H PRN    quetiapine tablet 75 mg QHS    tacrolimus capsule 1 mg BID    [START ON 10/25/2017] valganciclovir 50 mg/ml oral solution 100 mg Every Mon, Wed, Fri       Objective:     Vital Signs (Most Recent):  Temp: (P) 97.6 °F (36.4 °C) (10/24/17 1100)  Pulse: 84 (10/24/17 0915)  Resp: 16 (10/24/17 0900)  BP: (P) 120/78 (10/24/17 1100)  SpO2: 98 % (10/24/17 0915)  O2 Device (Oxygen Therapy): room air (10/24/17 0507) Vital Signs (24h Range):  Temp:  [97.5 °F (36.4 °C)-97.6 °F (36.4 °C)] (P) 97.6 °F (36.4 °C)  Pulse:  [80-97] 84  Resp:  [16-20] 16  SpO2:  [95 %-100 %] 98 %  BP: (105-139)/(66-90) (P) 120/78  Arterial Line BP: (113-149)/(55-76) 147/72     Weight: 86 kg (189 lb 9.5 oz) (10/24/17 1100)  Body mass index is 29.69 kg/m².  Body surface area is 2.02 meters squared.    I/O last 3 completed shifts:  In: 2130 [P.O.:960; I.V.:330; Blood:340; Other:500]  Out: 2090 [Urine:5; Drains:1005; Other:1080]    Physical Exam   Constitutional: He is oriented to person, place, and time. He appears well-developed and well-nourished. He is easily aroused. No distress. He is sedated and not intubated.   HENT:   Head: Normocephalic and atraumatic.   Eyes: Conjunctivae and EOM are normal. Pupils are equal, round, and reactive to light. Scleral icterus is present.   Neck: Trachea normal and normal range of motion. Neck supple. No JVD present.   Cardiovascular: Normal rate, regular rhythm, S1 normal, S2 normal, normal heart sounds, intact distal pulses and normal pulses.  Exam reveals no gallop and no friction rub.    No  murmur heard.  Upper and lower ptting edema   Pulmonary/Chest: Effort normal and breath sounds normal. He is not intubated.   Abdominal: Soft. Bowel sounds are normal. He exhibits distension.   Musculoskeletal: Normal range of motion. He exhibits edema. He exhibits no deformity.   Neurological: He is alert, oriented to person, place, and time and easily aroused.   Skin: Skin is warm and dry. Capillary refill takes less than 2 seconds. He is not diaphoretic.   Psychiatric: He has a normal mood and affect. His behavior is normal.   Vitals reviewed.      Significant Labs:  ABGs:   Recent Labs  Lab 10/22/17  1523   PH 7.463*   PCO2 41.3   HCO3 29.6*   POCSATURATED 99   BE 6     BMP:   Recent Labs  Lab 10/22/17  0809  10/24/17  0420   GLU  --   < > 111*   CL  --   < > 103   CO2  --   < > 23   BUN  --   < > 33*   CREATININE  --   < > 2.5*   CALCIUM  --   < > 7.8*   MG 2.5  2.5  --   --    < > = values in this interval not displayed.  CBC:   Recent Labs  Lab 10/24/17  0420   WBC 7.30   RBC 2.80*   HGB 8.6*   HCT 24.5*   PLT 37*   MCV 88   MCH 30.7   MCHC 35.1     CMP:   Recent Labs  Lab 10/24/17  0420   *   CALCIUM 7.8*   ALBUMIN 1.9*   PROT 3.9*   *   K 4.1   CO2 23      BUN 33*   CREATININE 2.5*   ALKPHOS 172*   ALT 56*   *   BILITOT 6.8*     All labs within the past 24 hours have been reviewed.     Assessment/Plan:     LAURA (acute kidney injury)    S/p Liver transplant and HD during surgery    - likely ischemic ATN from decreased renal perfusion (volume shifts from paracentesis, anemia)  - urine sediment with muddy brown casts  - Last dialysis treatment was on Saturday and Sunday SLED was hold   - Serum creatinine worsened compared with yesterday from 2.2-->2.5  - Patient continues to be anuric since 2 days ago, has only made 5 cc yesterday, no urine today  - Will have dialysis today for removal of excess fluid and toxins, duration of 3 hrs and UF 2-3 L as tolerated by patient, maintain MAP >65    - Patient only had 1.5 hrs of treatment yesterday since presented difficulty with  Filtration, which was changed this morning   - Patient presented this morning with nasal canula 2 L   - Will reassess tomorrow if any need of treatment             Win Sandoval  Nephrology  Fellow  [facility]    Pager [phone]    ATTENDING PHYSICIAN ATTESTATION  I have personally interviewed and examined the patient. I thoroughly reviewed the demographic, clinical, laboratorial and imaging information available in medical records. I agree with the assessment and recommendations provided by the subspecialty resident. Dr. Gentile was under my supervision.

## 2017-10-24 NOTE — PT/OT/SLP RE-EVAL
Physical Therapy  Re-evaluation    Jhonny Diana   MRN: [id number]   Admitting Diagnosis: Liver replaced by transplant    PT Received On: 10/24/17  PT Start Time: 1308     PT Stop Time: 1323    PT Total Time (min): 16 min       Billable Minutes:  Re-eval 8 min and Therapeutic Activity 8 min    Diagnosis: Liver replaced by transplant  S/p liver transplant 10/20/17    Past Medical History:   Diagnosis Date    Alcoholic hepatitis with ascites     Alcoholic hepatitis with ascites     History of hematemesis 9/28/2017    Hypertension       Past Surgical History:   Procedure Laterality Date    APPENDECTOMY         Referring physician: Imer Badillo  Date referred to PT: 10/20/17    General Precautions: Standard, fall, contact  Orthopedic Precautions: N/A   Braces:         Do you have any cultural, spiritual, Baptism conflicts, given your current situation?: none reported     Patient History:  Living Environment Comment: Pt currently lives with female roommate in an apartment in [place], [place]. Pt plans to return to [facility] upon d/c that has 4 steps to enter and B HR. Prior to admit pt indepednent with ambulation and ADL's. Pt works full time as .  Equipment Currently Used at Home: none  DME owned (not currently used): none    Previous Level of Function:  Ambulation Skills: independent  Transfer Skills: independent    Subjective:  Communicated with nurse prior to session.      Chief Complaint: pt stated that he wanted to use the bathroom.   Patient goals:  To get better and go home.     Pain/Comfort  Pain Rating 1: 0/10  Pain Rating Post-Intervention 1: 0/10    Objective:   Patient found with: telemetry, blood pressure cuff, pulse ox (continuous), central line, PICC line     Cognitive Exam:  Oriented to: Person, Place, Time and Situation    Follows Commands/attention: Follows multistep  commands  Communication: clear/fluent  Safety awareness/insight to disability: intact    Physical Exam:    Lower  Extremity Range of Motion:  Right Lower Extremity: WFL  Left Lower Extremity: WFL    Lower Extremity Strength:  Right Lower Extremity: WFL  Left Lower Extremity: WFL       Functional Mobility:  Bed Mobility:  Rolling/Turning Right: Moderate assistance (pt needed verbal cues for hand placement and sequencing for functional mobility. )  Supine to Sit: Moderate Assistance  Sit to Supine: Maximum Assistance    Transfers:  Sit <> Stand Assistance: Minimum Assistance  Sit <> Stand Assistive Device: No Assistive Device  Bed <> Chair Technique:  (not tested. pt was going to get dialysis and needed to be in bed. )    Gait:   Gait Distance: 4 side steps to head of bed.   Assistance 1: Contact Guard Assistance  Gait Assistive Device: Hand held assist  Gait Pattern: 2-point gait    AM-PAC 6 CLICK MOBILITY  How much help from another person does this patient currently need?   1 = Unable, Total/Dependent Assistance  2 = A lot, Maximum/Moderate Assistance  3 = A little, Minimum/Contact Guard/Supervision  4 = None, Modified [place]/Independent    Turning over in bed (including adjusting bedclothes, sheets and blankets)?: 2  Sitting down on and standing up from a chair with arms (e.g., wheelchair, bedside commode, etc.): 3  Moving from lying on back to sitting on the side of the bed?: 2  Moving to and from a bed to a chair (including a wheelchair)?: 3  Need to walk in hospital room?: 3  Climbing 3-5 steps with a railing?: 1  Total Score: 14     AM-PAC Raw Score CMS G-Code Modifier Level of Impairment Assistance   6 % Total / Unable   7 - 9 CM 80 - 100% Maximal Assist   10 - 14 CL 60 - 80% Moderate Assist   15 - 19 CK 40 - 60% Moderate Assist   20 - 22 CJ 20 - 40% Minimal Assist   23 CI 1-20% SBA / CGA   24 CH 0% Independent/ Mod I     Patient left supine with all lines intact and call button in reach.    Assessment:   Jhonny Diana is a 27 y.o. male with a medical diagnosis of Liver replaced by transplant and presents  with decreased mobility, transfers and decreased distance ambulated. Pt would benefit from cont PT to address deficits and will be able to discharge home with no therapy or DME needs. Pt is s/p liver transplant 10/20/17.    Rehab identified problem list/impairments: Rehab identified problem list/impairments: impaired endurance, impaired functional mobilty, gait instability    Rehab potential is excellent.    Activity tolerance: Good    Discharge recommendations: Discharge Facility/Level Of Care Needs: home health PT     Barriers to discharge: Barriers to Discharge: None    Equipment recommendations: Equipment Needed After Discharge: none     GOALS:    Physical Therapy Goals        Problem: Physical Therapy Goal    Goal Priority Disciplines Outcome Goal Variances Interventions   Physical Therapy Goal     PT/OT, PT Ongoing (interventions implemented as appropriate)     Description:  Goals to be met by: 2017    Patient will increase functional independence with mobility by performin. Supine to sit with Mod I. - not met  2. Sit to supine with mod I.- not met  3. Sit to stand transfer with SBA using LRAD or no Ad. (met 10/19/17 using RW) -not met  4. Bed to chair transfer with SBA using LRAD -not met  5. Gait x150 feet with SBA using LRAD or no Ad. - not emt  6. Ascend/descend 4 stair with bilateral Handrails with CGA.-not met  7. Lower extremity exercise program x20 reps per handout, with independence -not met                               PLAN:    Patient to be seen 4 x/week to address the above listed problems via gait training, therapeutic activities, therapeutic exercises  Plan of Care expires: 17  Plan of Care reviewed with: patient          Tammie Whitmore, PT  10/24/2017

## 2017-10-24 NOTE — PROGRESS NOTES
[facility]  Transplant Surgery  Progress Note      Hospital Day Hospital Day: 28  Post-Op Day 5 Days Post-Op    Admit Diagnosis: ESLD  Procedures: OLTx    ORGAN:   LIVER  Disease Etiology: Acute Alcoholic Hepatitis  Donor Type:    - Brain Death  CDC High Risk:   No  Donor CMV Status:   Donor CMV Status: Positive  Donor HBcAB:   Negative  Donor HCV Status:   Negative  Whole or Partial: Whole Liver  Biliary Anastomosis: End to End  Arterial Anatomy: Standard      Subjective:     Interval History:   No acute events last night. Supplemental O2 weaned off. Pain well controlled. Some confusion persists but he continues to improve. Out of bed to chair and awake most of day yesterday, slept all night last night. Essentially no urine output (5ml). Tolerating renal diet. HD attempted twice, catheter not working properly, only about 500 ml taken off. Did get 1u pRBCs with HD.     Medications:  Continuous Infusions:     Scheduled Meds:   famotidine (PF)  20 mg Intravenous Q12H    fluconazole (DIFLUCAN) IVPB  200 mg Intravenous Q24H    heparin (porcine)  5,000 Units Subcutaneous Q8H    hydrALAZINE  10 mg Intravenous Once    methylPREDNISolone sodium succinate  40 mg Intravenous Q12H    Followed by    [START ON 10/25/2017] methylPREDNISolone sodium succinate  20 mg Intravenous Q12H    Followed by    [START ON 10/26/2017] predniSONE  20 mg Oral Daily    mupirocin  1 g Nasal BID    mycophenolate mofetil  1,000 mg Oral BID    QUEtiapine  75 mg Oral QHS    tacrolimus  1 mg Oral BID    [START ON 10/25/2017] valganciclovir 50 mg/ml  100 mg Per NG tube Every Mon, Wed, Fri     PRN Meds:sodium chloride, sodium chloride 0.9%, dextrose 50%, dextrose 50%, glucagon (human recombinant), glucose, glucose, HYDROmorphone, insulin aspart, oxyCODONE, oxyCODONE     Objective:     Vital Signs (Most Recent):  Temp: 97.6 °F (36.4 °C) (10/24/17 0300)  Pulse: 84 (10/24/17 0630)  Resp: 20 (10/24/17 0500)  BP: 139/83  (10/24/17 0600)  SpO2: 97 % (10/24/17 0630) Vital Signs (24h Range):  Temp:  [97.5 °F (36.4 °C)-97.6 °F (36.4 °C)] 97.6 °F (36.4 °C)  Pulse:  [80-98] 84  Resp:  [16-20] 20  SpO2:  [95 %-100 %] 97 %  BP: (105-139)/(66-90) 139/83  Arterial Line BP: (113-147)/(55-76) 137/68       Intake/Output Summary (Last 24 hours) at 10/24/17 0644  Last data filed at 10/24/17 0500   Gross per 24 hour   Intake             1780 ml   Output             1475 ml   Net              305 ml       Neuro:  GCS: 15  Sedation:  no     Restraints:  no  Neurologic: Mental status: follows commands, confusion improving    Cardiac:      Temp:  [97.5 °F (36.4 °C)-97.6 °F (36.4 °C)]   Pulse:  [80-98]   Resp:  [16-20]   BP: (105-139)/(66-90)   SpO2:  [95 %-100 %]   Arterial Line BP: (113-147)/(55-76)   Heart: regular rate and rhythm, S1, S2 normal, no murmur, rub or gallop  Pulses: 2+ and symmetric    Pulmonary:     Recent Labs  Lab 10/22/17  1523   PH 7.463*   PO2 121*   PCO2 41.3   HCO3 29.6*   BE 6       Lungs:  Clear to auscultation bilaterally  Chest X-ray: pulm edema worsened, atelectasis RUL    Renal:   I & O (Last 24H):    Intake/Output Summary (Last 24 hours) at 10/24/17 0644  Last data filed at 10/24/17 0500   Gross per 24 hour   Intake             1780 ml   Output             1475 ml   Net              305 ml       Recent Labs  Lab 10/23/17  0813 10/23/17  2000 10/24/17  0420   BUN 28*  28* 26* 33*   CREATININE 2.4*  2.4* 2.0* 2.5*   K 4.6  4.6 4.0 4.1       Recent Labs  Lab 10/23/17  0813 10/23/17  2000 10/24/17  0420     137 134* 134*   K 4.6  4.6 4.0 4.1     104 103 103   CO2 25  25 24 23   CALCIUM 8.7  8.7 7.5* 7.8*   *  116* 112* 111*       Recent Labs  Lab 10/21/17  1359 10/21/17  2200 10/21/17  2330 10/22/17  0400 10/22/17  0809   PHOS 3.1 3.3  3.3  --  3.2  3.2  --    MG 1.9  --  2.7*  2.7*  --  2.5  2.5       FEN/GI    Recent Labs  Lab 10/23/17  0813 10/23/17  2000 10/24/17  0420   BILITOT 7.9*  7.9*  7.3* 6.8*   *  124*  124* 139*  139* 110*   ALT 73*  73* 68* 56*   ALKPHOS 104  104 166* 172*   ALBUMIN 1.9*  1.9* 2.0* 1.9*       Recent Labs  Lab 10/23/17  0813 10/23/17  2000 10/24/17  0420   INR 1.1 1.0 1.0      Nutrition: PO: Full Regular and renal  Abdomen: expected post-operative tenderness and chevron with no drsg soakthrough, drains removed  GI ulcer Prophylaxis: yes    HEME:     Recent Labs  Lab 10/23/17  0813 10/23/17  1600 10/23/17  2330 10/24/17  0420   WBC 7.66 8.58 11.39 7.30   HGB 7.3* 7.1* 8.8* 8.6*   HCT 21.2* 20.1* 25.5* 24.5*   PLT 48* 51* 40*  --      DVT Prophylaxis: Pharmacologic yes  Mechanical: yes     Infectious Disease:  Temp:  [97.5 °F (36.4 °C)-97.6 °F (36.4 °C)]   Pulse:  [80-98]   Resp:  [16-20]   BP: (105-139)/(66-90)   SpO2:  [95 %-100 %]   Arterial Line BP: (113-147)/(55-76)   Antibiotics     Start     Stop Route Frequency Ordered    10/20/17 0900  mupirocin 2 % ointment 1 g      10/25 0859 Nasl 2 times daily 10/20/17 0346            Significant Diagnostics:  U/S: I have reviewed all pertinent results/findings within the past 24 hours. .     Findings: The liver measures 15.1cm.  Hepatic parenchyma is homogeneous without evidence for masses.  No intrahepatic biliary ductal dilatation. The common bile duct measures 0.3 cm. There is mild ascites. There is a peritransplant fluid collection anterior to the right hepatic lobe which has not significantly changed from the prior study and measures 7.6 x 7.8 x 2.4 cm.     Color Doppler images demonstrate appropriate flow in the main portal vein and its branches, all 3 hepatic veins, and the IVC.  Technically difficult exam with difficulty visualizing the arterial system. The main hepatic artery is patent without tardus parvus waveform. The main hepatic artery peak systolic velocity is 31 cm/sec, previously 77 cm/s.  Slow flow is noted throughout the hepatic arterial system. Hepatic artery resistive indices are mostly within  normal limits and as follows:   Main hepatic artery: 1.0, previously 1.0  Left hepatic artery: 0.63, previously 1.0   Right hepatic artery, Anterior: 0.79, previously 1.0   Right hepatic artery, Posterior: Approximately 0.7 (calculated manually), previously 0.63    Assessment/Plan:   Jhonny Diana is a 27 y.o. male s/p OLTx for acute alcoholic hepatitis.    Neuro: Post-operative pain  - Continue current pain control regimen- oxycodone and dilaudid for breakthrough  - OOBTC, prevent day/night reversal - Ot of bed in chair, blinds up, lights and TV on during day. Lights off and TV off at night.  - seroquel nightly    Resp:  - Extubated 10/21/17  - Minimize supplemental O2 requirements  - aggressive pulmonary toilet: Encourage IS, deep breathing, cough    CV:  - HDS  - No current vasopressor requirement  - prn for hypertension, unless sustained in which case will start nifedipine    Heme: Anemia  - Hgb/Hct stable at 8.6 after appropriate response to 1 u pRBC transfusion yesterday with HD.   - thrombocytopenia - transfuse 1 pk plts today when changing HD line over wire  - Continue to trend    ID:  - on meropenem from Kleb in sputum 1/6, 1st dose periop and d/c'd 10/13  - G6PD normal - start dapsone at some point (not today) in substitute for Bactrim  - note: there were a few serosal tears due to dense adhesions, which were over sewn  - newer cultures NGTD    Immuno: Immunocompromised state  - Continue current immunosuppression regimen  - Daily Prograf level - yesterday 9, on 1 mg BID  - Continue opportunistic infection prophylaxis    Renal:  - d/c chicas and serial bladder scan  - Strict I/Os  - appreciate nephrology recs: HD after HD line COW  - Trend CVP    FEN/GI: ESLD s/p OLT, transaminitis, protein calorie malnutrition  - Liver U/S yesterday showed stable fluid collection and some slowed arterial flow  - d/c'd abdominal drains  - Trend LFTs - still downtrending  - Replace lytes PRN    Endo: Hyperglycemia  - Endocrine  following, appreciate assistance  - Accuchecks  - Insulin gtt weaned off per endocrine    Dispo: change dialysis catheter over wire then step down to TSU, needs HD today

## 2017-10-24 NOTE — PROGRESS NOTES
Pharmacist Intervention IV to PO Note    Jhonny Diana is a 27 y.o. male being treated with IV medication famotidine    Patient Data:    Vital Signs (Most Recent):  Temp: 97.6 °F (36.4 °C) (10/24/17 0730)  Pulse: 84 (10/24/17 0915)  Resp: 16 (10/24/17 0900)  BP: 124/78 (10/24/17 0915)  SpO2: 98 % (10/24/17 0915) Vital Signs (72h Range):  Temp:  [97.4 °F (36.3 °C)-98.4 °F (36.9 °C)]   Pulse:  []   Resp:  [16-20]   BP: (102-143)/(61-90)   SpO2:  [88 %-100 %]   Arterial Line BP: (113-169)/(55-94)      CBC:    Recent Labs     Lab 10/23/17  1600 10/23/17  2330 10/24/17  0420   WBC 8.58 11.39 7.30   RBC 2.33* 2.94* 2.80*   HGB 7.1* 8.8* 8.6*   HCT 20.1* 25.5* 24.5*   PLT 51* 40* 37*   MCV 86 87 88   MCH 30.5 29.9 30.7   MCHC 35.3 34.5 35.1     CMP:     Recent Labs     Lab 10/23/17  0813 10/23/17  2000 10/24/17  0420   *  116* 112* 111*   CALCIUM 8.7  8.7 7.5* 7.8*   ALBUMIN 1.9*  1.9* 2.0* 1.9*   PROT 3.9*  3.9* 4.0* 3.9*     137 134* 134*   K 4.6  4.6 4.0 4.1   CO2 25  25 24 23     104 103 103   BUN 28*  28* 26* 33*   CREATININE 2.4*  2.4* 2.0* 2.5*   ALKPHOS 104  104 166* 172*   ALT 73*  73* 68* 56*   *  124*  124* 139*  139* 110*   BILITOT 7.9*  7.9* 7.3* 6.8*       Dietary Orders:  Diet Orders            Diet renal: Renal starting at 10/23 0942            Based on the following criteria, this patient qualifies for intravenous to oral conversion:  [x] The patients gastrointestinal tract is functioning (tolerating medications via oral or enteral route for 24 hours and tolerating food or enteral feeds for 24 hours).      IV medication famotidine will be changed to oral medication famotidine    Pharmacist's Name: Angy Cox  Pharmacist's Extension: [phone]

## 2017-10-24 NOTE — PROGRESS NOTES
"[facility]  Endocrinology  Progress Note    Admit Date: 9/27/2017     Reason for Consult: Management of Hyperglycemia     Surgical Procedure and Date: OLT 10/20/2017    HPI: Patient is a 27 y.o. 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 No personal or family h/o DM. Endocrine consulted for BG management.     Interval HPI:   tolerating diet with no insulin needs  Afebrile  No hypoglycemia    /78   Pulse 84   Temp 97.6 °F (36.4 °C) (Oral)   Resp 16   Ht 5' 7" (1.702 m)   Wt 86 kg (189 lb 9.5 oz)   SpO2 98%   BMI 29.69 kg/m²       Labs Reviewed and Include      Recent Labs  Lab 10/24/17  0420   *   CALCIUM 7.8*   ALBUMIN 1.9*   PROT 3.9*   *   K 4.1   CO2 23      BUN 33*   CREATININE 2.5*   ALKPHOS 172*   ALT 56*   *   BILITOT 6.8*     Lab Results   Component Value Date    WBC 7.30 10/24/2017    HGB 8.6 (L) 10/24/2017    HCT 24.5 (L) 10/24/2017    MCV 88 10/24/2017    PLT 37 (LL) 10/24/2017     No results for input(s): TSH, FREET4 in the last 168 hours.  Lab Results   Component Value Date    HGBA1C <4.0 (A) 10/18/2017       Nutritional status:   Body mass index is 29.69 kg/m².  Lab Results   Component Value Date    ALBUMIN 1.9 (L) 10/24/2017    ALBUMIN 2.0 (L) 10/23/2017    ALBUMIN 1.9 (L) 10/23/2017    ALBUMIN 1.9 (L) 10/23/2017     Lab Results   Component Value Date    PREALBUMIN 5 (L) 10/11/2017       Estimated Creatinine Clearance: 46.1 mL/min (based on SCr of 2.5 mg/dL (H)).    Accu-Checks  Recent Labs      10/22/17   0358  10/22/17   0818  10/22/17   1231  10/22/17   1624  10/22/17   2031  10/23/17   0140  10/23/17   0505  10/23/17   0819  10/23/17   1208  10/23/17   1702   POCTGLUCOSE  138*  103  119*  131*  124*  137*  120*  123*  137*  125*       Current Medications and/or Treatments Impacting Glycemic Control  Immunotherapy:  Immunosuppressants         Stop Route Frequency     tacrolimus capsule 1 mg      -- " Oral 2 times daily     mycophenolate mofetil 200 mg/mL suspension 1,000 mg      -- Oral 2 times daily        Steroids:   Hormones     Start     Stop Route Frequency Ordered    10/26/17 0900  predniSONE tablet 20 mg  (methylprednisolone taper panel)      -- Oral Daily 10/20/17 0346    10/25/17 0900  methylPREDNISolone sodium succinate injection 20 mg  (methylprednisolone taper panel)      10/26 0859 IV Every 12 hours 10/20/17 0346    10/24/17 0900  methylPREDNISolone sodium succinate injection 40 mg  (methylprednisolone taper panel)      10/25 0859 IV Every 12 hours 10/20/17 0346        Pressors:    Autonomic Drugs     None        Hyperglycemia/Diabetes Medications: Antihyperglycemics     Start     Stop Route Frequency Ordered    10/23/17 1106  insulin aspart pen 0-5 Units      -- SubQ Before meals & nightly PRN 10/23/17 1006          ASSESSMENT and PLAN    * Liver transplant 10/19/2017 for acute alcoholic hepatitis    Per LTS          Hyperglycemia    No insulin needs, will sign off  Please re-consult for changes         Immunosuppression     Increases insulin needs.         Obesity due to excess calories    Increases insulin resistance.  Body mass index is 29.69 kg/m².        Liver transplanted    Avoid hypoglycemia.           Prophylactic immunotherapy      May increase insulin resistance         LAURA (acute kidney injury)    Estimated Creatinine Clearance: 46.1 mL/min (based on SCr of 2.5 mg/dL (H)).  Avoid insulin stacking  CRRT per nephrology               Jazmine Bejarano, MATILDE, NP  Endocrinology  [facility]

## 2017-10-24 NOTE — SUBJECTIVE & OBJECTIVE
Interval History:   Patient evaluated at bedside, hemodynamically stable, no respiratory distress, had problems with cath during dialysis yesterday in which was replaced today. Intake 1.7 L, urinary output 5 cc total 1.4 L     Review of patient's allergies indicates:   Allergen Reactions    Bactrim [sulfamethoxazole-trimethoprim] Other (See Comments)     Mookie Trell Syndrome     Current Facility-Administered Medications   Medication Frequency    0.9%  NaCl infusion (for blood administration) Q24H PRN    0.9%  NaCl infusion (for blood administration) Q24H PRN    0.9%  NaCl infusion (for blood administration) Q24H PRN    0.9%  NaCl infusion PRN    0.9%  NaCl infusion Once    dextrose 50% injection 12.5 g PRN    dextrose 50% injection 25 g PRN    ergocalciferol capsule 50,000 Units Q7 Days    famotidine tablet 20 mg QHS    [START ON 10/25/2017] fluconazole tablet 200 mg Daily    glucagon (human recombinant) injection 1 mg PRN    glucose chewable tablet 16 g PRN    glucose chewable tablet 24 g PRN    heparin (porcine) injection 5,000 Units Q8H    hydrALAZINE injection 10 mg Once    hydromorphone injection 0.5 mg Q6H PRN    insulin aspart pen 0-5 Units QID (AC + HS) PRN    methylPREDNISolone sodium succinate injection 40 mg Q12H    Followed by    [START ON 10/25/2017] methylPREDNISolone sodium succinate injection 20 mg Q12H    Followed by    [START ON 10/26/2017] predniSONE tablet 20 mg Daily    mupirocin 2 % ointment 1 g BID    mycophenolate mofetil 200 mg/mL suspension 1,000 mg BID    oxycodone immediate release tablet 10 mg Q6H PRN    oxycodone immediate release tablet 5 mg Q6H PRN    quetiapine tablet 75 mg QHS    tacrolimus capsule 1 mg BID    [START ON 10/25/2017] valganciclovir 50 mg/ml oral solution 100 mg Every Mon, Wed, Fri       Objective:     Vital Signs (Most Recent):  Temp: (P) 97.6 °F (36.4 °C) (10/24/17 1100)  Pulse: 84 (10/24/17 0915)  Resp: 16 (10/24/17 0900)  BP: (P)  120/78 (10/24/17 1100)  SpO2: 98 % (10/24/17 0915)  O2 Device (Oxygen Therapy): room air (10/24/17 0507) Vital Signs (24h Range):  Temp:  [97.5 °F (36.4 °C)-97.6 °F (36.4 °C)] (P) 97.6 °F (36.4 °C)  Pulse:  [80-97] 84  Resp:  [16-20] 16  SpO2:  [95 %-100 %] 98 %  BP: (105-139)/(66-90) (P) 120/78  Arterial Line BP: (113-149)/(55-76) 147/72     Weight: 86 kg (189 lb 9.5 oz) (10/24/17 1100)  Body mass index is 29.69 kg/m².  Body surface area is 2.02 meters squared.    I/O last 3 completed shifts:  In: 2130 [P.O.:960; I.V.:330; Blood:340; Other:500]  Out: 2090 [Urine:5; Drains:1005; Other:1080]    Physical Exam   Constitutional: He is oriented to person, place, and time. He appears well-developed and well-nourished. He is easily aroused. No distress. He is sedated and not intubated.   HENT:   Head: Normocephalic and atraumatic.   Eyes: Conjunctivae and EOM are normal. Pupils are equal, round, and reactive to light. Scleral icterus is present.   Neck: Trachea normal and normal range of motion. Neck supple. No JVD present.   Cardiovascular: Normal rate, regular rhythm, S1 normal, S2 normal, normal heart sounds, intact distal pulses and normal pulses.  Exam reveals no gallop and no friction rub.    No murmur heard.  Upper and lower ptting edema   Pulmonary/Chest: Effort normal and breath sounds normal. He is not intubated.   Abdominal: Soft. Bowel sounds are normal. He exhibits distension.   Musculoskeletal: Normal range of motion. He exhibits edema. He exhibits no deformity.   Neurological: He is alert, oriented to person, place, and time and easily aroused.   Skin: Skin is warm and dry. Capillary refill takes less than 2 seconds. He is not diaphoretic.   Psychiatric: He has a normal mood and affect. His behavior is normal.   Vitals reviewed.      Significant Labs:  ABGs:   Recent Labs  Lab 10/22/17  1523   PH 7.463*   PCO2 41.3   HCO3 29.6*   POCSATURATED 99   BE 6     BMP:   Recent Labs  Lab 10/22/17  0809   10/24/17  0420   GLU  --   < > 111*   CL  --   < > 103   CO2  --   < > 23   BUN  --   < > 33*   CREATININE  --   < > 2.5*   CALCIUM  --   < > 7.8*   MG 2.5  2.5  --   --    < > = values in this interval not displayed.  CBC:   Recent Labs  Lab 10/24/17  0420   WBC 7.30   RBC 2.80*   HGB 8.6*   HCT 24.5*   PLT 37*   MCV 88   MCH 30.7   MCHC 35.1     CMP:   Recent Labs  Lab 10/24/17  0420   *   CALCIUM 7.8*   ALBUMIN 1.9*   PROT 3.9*   *   K 4.1   CO2 23      BUN 33*   CREATININE 2.5*   ALKPHOS 172*   ALT 56*   *   BILITOT 6.8*     All labs within the past 24 hours have been reviewed.

## 2017-10-24 NOTE — PROCEDURES
Central line change over wire  Hemodialysis catheter, triple lumen, Right IJ    After consent was obtained from patient and all risks and complications were explained, Pt was prepped and draped in the usual surgical sterile fashion. Pt was positioned in a supine position.     The old central line was prepped and a guidewire was then used to cannulate the catheter at the distal port. After the catheter was removed over the wire, the new triple lumen hemodialysis catheter was advanced over the wire and fixed at 16 cm. The central line was then sutured in place after using 2cc of local lidocaine to anesthetize the area. All lumens flushed and aspirated easily, and the central line was dressed under sterile conditions. Pt tolerated procedure well without any complications.    A CXR was ordered post procedure and will be followed for placement.

## 2017-10-24 NOTE — PROGRESS NOTES
Dialysis     3hours of dialysis completed UF goal of 3 liters met. Vitals remained stable through out tx.  Cath flowed well for 2 hours the decreased blood flow due to arterial sucking . Then reversed lumens to complete tx. Report given

## 2017-10-24 NOTE — PROGRESS NOTES
Dialysis to rm [id number] to perform 3 hour dialysis with UF goal of 3 liters. New rt IJ cath. Aspirates and flushes freely. tx initiated without dificulty.

## 2017-10-24 NOTE — SUBJECTIVE & OBJECTIVE
"Interval HPI:   tolerating diet with no insulin needs  Afebrile  No hypoglycemia    /78   Pulse 84   Temp 97.6 °F (36.4 °C) (Oral)   Resp 16   Ht 5' 7" (1.702 m)   Wt 86 kg (189 lb 9.5 oz)   SpO2 98%   BMI 29.69 kg/m²     Labs Reviewed and Include      Recent Labs  Lab 10/24/17  0420   *   CALCIUM 7.8*   ALBUMIN 1.9*   PROT 3.9*   *   K 4.1   CO2 23      BUN 33*   CREATININE 2.5*   ALKPHOS 172*   ALT 56*   *   BILITOT 6.8*     Lab Results   Component Value Date    WBC 7.30 10/24/2017    HGB 8.6 (L) 10/24/2017    HCT 24.5 (L) 10/24/2017    MCV 88 10/24/2017    PLT 37 (LL) 10/24/2017     No results for input(s): TSH, FREET4 in the last 168 hours.  Lab Results   Component Value Date    HGBA1C <4.0 (A) 10/18/2017       Nutritional status:   Body mass index is 29.69 kg/m².  Lab Results   Component Value Date    ALBUMIN 1.9 (L) 10/24/2017    ALBUMIN 2.0 (L) 10/23/2017    ALBUMIN 1.9 (L) 10/23/2017    ALBUMIN 1.9 (L) 10/23/2017     Lab Results   Component Value Date    PREALBUMIN 5 (L) 10/11/2017       Estimated Creatinine Clearance: 46.1 mL/min (based on SCr of 2.5 mg/dL (H)).    Accu-Checks  Recent Labs      10/22/17   0358  10/22/17   0818  10/22/17   1231  10/22/17   1624  10/22/17   2031  10/23/17   0140  10/23/17   0505  10/23/17   0819  10/23/17   1208  10/23/17   1702   POCTGLUCOSE  138*  103  119*  131*  124*  137*  120*  123*  137*  125*       Current Medications and/or Treatments Impacting Glycemic Control  Immunotherapy:  Immunosuppressants         Stop Route Frequency     tacrolimus capsule 1 mg      -- Oral 2 times daily     mycophenolate mofetil 200 mg/mL suspension 1,000 mg      -- Oral 2 times daily        Steroids:   Hormones     Start     Stop Route Frequency Ordered    10/26/17 0900  predniSONE tablet 20 mg  (methylprednisolone taper panel)      -- Oral Daily 10/20/17 0346    10/25/17 0900  methylPREDNISolone sodium succinate injection 20 mg  (methylprednisolone " taper panel)      10/26 0859 IV Every 12 hours 10/20/17 0346    10/24/17 0900  methylPREDNISolone sodium succinate injection 40 mg  (methylprednisolone taper panel)      10/25 0859 IV Every 12 hours 10/20/17 0346        Pressors:    Autonomic Drugs     None        Hyperglycemia/Diabetes Medications: Antihyperglycemics     Start     Stop Route Frequency Ordered    10/23/17 1106  insulin aspart pen 0-5 Units      -- SubQ Before meals & nightly PRN 10/23/17 1006

## 2017-10-24 NOTE — PROGRESS NOTES
[facility]  Adult Nutrition  Consult Note    SUMMARY     Recommendations    1. Continue current renal diet.   2. Add Beneprotein ONS to aid in caloric intake.  3. Post transplant education complete.   4. RD to monitor & follow-up.     Goals: Pt to return to nutritionally adequate diet within 48 hours  Nutrition Goal Status: progressing towards goal  Communication of RD Recs: reviewed with RN    Reason for Assessment    Reason for Assessment: RD follow-up  Diagnosis: transplant/postoperative complications (OLTx 10/19)  Relevent Medical History: none significant aside from EtOH abuse   Interdisciplinary Rounds: did not attend     General Information Comments: POD #5 s/p liver transplant. Pt on renal diet w/ improving appetite, consumed 50% of breakfast this AM, tolerating diet. Doesnt like Boost/Novasource ONS, requesting Beneprotein. Receiving HD.    Nutrition Discharge Planning: Post transplant nutrition education provided. Food safety/drug interactions emphasized. General healthy diet recommended. RD name/contact information, education material left.  No other needs identified. Pt's mother present; pt seemed slightly disoriented.    Nutrition Prescription Ordered    Current Diet Order: Renal     Current Nutrition Support Formula Ordered: Discontinued    Nutrition/Diet History    Patient Reported Diet/Restrictions/Preferences: low salt     Typical Food/Fluid Intake: Very poor intake for 2-3 weeks PTA     Food Preferences: No Pentecostal or cultural food preferences noted     Factors Affecting Nutritional Intake: decreased appetite    Labs/Tests/Procedures/Meds    Pertinent Labs Reviewed: reviewed  Pertinent Labs Comments: Na 134, BUN 33, Creat 2.5, GFR 33.7, Gluc 111, Bili 5.3  Pertinent Medications Reviewed: reviewed, pertinent  Pertinent Medications Comments: Prednisone    Physical Findings    Overall Physical Appearance: edematous  Tubes:  (none)  Skin: jaundice, incision (+  "drains)    Anthropometrics    Height: 5' 7" (170.2 cm)  Weight Method: Bed Scale  Weight: 86 kg (189 lb 9.5 oz)    Ideal Body Weight (IBW), Male: 148 lb  % Ideal Body Weight, Male (lb): 128.11 lb     BMI (Calculated): 29.8  BMI Grade: 25 - 29.9 - overweight     Usual Body Weight (UBW), kg:  (2/2 fluid shifts)    Estimated/Assessed Needs    Weight Used For Calorie Calculations: 86 kg (189 lb 9.5 oz)      Energy Calorie Requirements (kcal): 2235 kcal/d  Energy Need Method: Baraga-St Jeor (1.25 PAL)     Weight Used For Protein Calculations: 86 kg (189 lb 9.5 oz)  Protein Requirements: 103-121 g/d (1.2-1.4 g/kg)     Fluid Need Method: RDA Method (1ml/kcal or per MD)    Assessment and Plan        * Liver transplant 10/19/2017 for acute alcoholic hepatitis     Nutrition Diagnosis:  Increased nutrient needs     Related to (etiology):   protein for healing and muscle maintenance     Signs and Symptoms (as evidenced by):   S/p OLTx 10/19 and poor intake prior to sgy     Nutrition Diagnosis Status:   New     Monitor and Evaluation    Food and Nutrient Intake: energy intake, food and beverage intake  Food and Nutrient Adminstration: diet order     Physical Activity and Function: nutrition-related ADLs and IADLs  Anthropometric Measurements: weight, weight change  Biochemical Data, Medical Tests and Procedures: lipid profile, inflammatory profile, glucose/endocrine profile, gastrointestinal profile, electrolyte and renal panel  Nutrition-Focused Physical Findings: overall appearance    Nutrition Risk    Level of Risk: other (see comments) (1x/week)    Nutrition Follow-Up    RD Follow-up?: Yes      "

## 2017-10-24 NOTE — ASSESSMENT & PLAN NOTE
Estimated Creatinine Clearance: 46.1 mL/min (based on SCr of 2.5 mg/dL (H)).  Avoid insulin stacking  CRRT per nephrology

## 2017-10-24 NOTE — PLAN OF CARE
Problem: Physical Therapy Goal  Goal: Physical Therapy Goal  Goals to be met by: 2017    Patient will increase functional independence with mobility by performin. Supine to sit with Mod I. - not met  2. Sit to supine with mod I.- not met  3. Sit to stand transfer with SBA using LRAD or no Ad. (met 10/19/17 using RW) -not met  4. Bed to chair transfer with SBA using LRAD -not met  5. Gait x150 feet with SBA using LRAD or no Ad. - not emt  6. Ascend/descend 4 stair with bilateral Handrails with CGA.-not met  7. Lower extremity exercise program x20 reps per handout, with independence -not met             Re-eval completed and goals appropriate. Tammie Whitmore, PT 10/24/2017

## 2017-10-25 LAB
ALBUMIN SERPL BCP-MCNC: 2 G/DL
ALP SERPL-CCNC: 234 U/L
ALT SERPL W/O P-5'-P-CCNC: 37 U/L
ANION GAP SERPL CALC-SCNC: 9 MMOL/L
APTT BLDCRRT: 23.3 SEC
AST SERPL-CCNC: 84 U/L
AST SERPL-CCNC: 84 U/L
BASOPHILS # BLD AUTO: 0 K/UL
BASOPHILS NFR BLD: 0 %
BILIRUB DIRECT SERPL-MCNC: 4.2 MG/DL
BILIRUB SERPL-MCNC: 5.6 MG/DL
BUN SERPL-MCNC: 29 MG/DL
CALCIUM SERPL-MCNC: 7.7 MG/DL
CHLORIDE SERPL-SCNC: 104 MMOL/L
CO2 SERPL-SCNC: 21 MMOL/L
CREAT SERPL-MCNC: 2.3 MG/DL
DIFFERENTIAL METHOD: ABNORMAL
EOSINOPHIL # BLD AUTO: 0 K/UL
EOSINOPHIL NFR BLD: 0 %
ERYTHROCYTE [DISTWIDTH] IN BLOOD BY AUTOMATED COUNT: 20.4 %
EST. GFR  (AFRICAN AMERICAN): 43.1 ML/MIN/1.73 M^2
EST. GFR  (NON AFRICAN AMERICAN): 37.3 ML/MIN/1.73 M^2
GGT SERPL-CCNC: 627 U/L
GLUCOSE SERPL-MCNC: 118 MG/DL
HCT VFR BLD AUTO: 23.7 %
HGB BLD-MCNC: 8.3 G/DL
IMM GRANULOCYTES # BLD AUTO: 0.12 K/UL
IMM GRANULOCYTES NFR BLD AUTO: 1.4 %
INR PPP: 1
LYMPHOCYTES # BLD AUTO: 0.7 K/UL
LYMPHOCYTES NFR BLD: 7.4 %
MCH RBC QN AUTO: 30.9 PG
MCHC RBC AUTO-ENTMCNC: 35 G/DL
MCV RBC AUTO: 88 FL
MONOCYTES # BLD AUTO: 0.5 K/UL
MONOCYTES NFR BLD: 5.3 %
NEUTROPHILS # BLD AUTO: 7.5 K/UL
NEUTROPHILS NFR BLD: 85.9 %
NRBC BLD-RTO: 1 /100 WBC
PLATELET # BLD AUTO: 85 K/UL
PMV BLD AUTO: 11 FL
POCT GLUCOSE: 106 MG/DL (ref 70–110)
POCT GLUCOSE: 107 MG/DL (ref 70–110)
POCT GLUCOSE: 116 MG/DL (ref 70–110)
POCT GLUCOSE: 117 MG/DL (ref 70–110)
POCT GLUCOSE: 123 MG/DL (ref 70–110)
POCT GLUCOSE: 131 MG/DL (ref 70–110)
POTASSIUM SERPL-SCNC: 4 MMOL/L
PROT SERPL-MCNC: 4.1 G/DL
PROTHROMBIN TIME: 10.6 SEC
RBC # BLD AUTO: 2.69 M/UL
SODIUM SERPL-SCNC: 134 MMOL/L
TACROLIMUS BLD-MCNC: 7.2 NG/ML
WBC # BLD AUTO: 8.76 K/UL

## 2017-10-25 PROCEDURE — 25000003 PHARM REV CODE 250: Performed by: STUDENT IN AN ORGANIZED HEALTH CARE EDUCATION/TRAINING PROGRAM

## 2017-10-25 PROCEDURE — 94761 N-INVAS EAR/PLS OXIMETRY MLT: CPT

## 2017-10-25 PROCEDURE — 25000242 PHARM REV CODE 250 ALT 637 W/ HCPCS: Performed by: STUDENT IN AN ORGANIZED HEALTH CARE EDUCATION/TRAINING PROGRAM

## 2017-10-25 PROCEDURE — 25000003 PHARM REV CODE 250: Performed by: SURGERY

## 2017-10-25 PROCEDURE — 63600175 PHARM REV CODE 636 W HCPCS: Performed by: STUDENT IN AN ORGANIZED HEALTH CARE EDUCATION/TRAINING PROGRAM

## 2017-10-25 PROCEDURE — 25000003 PHARM REV CODE 250: Performed by: TRANSPLANT SURGERY

## 2017-10-25 PROCEDURE — 85025 COMPLETE CBC W/AUTO DIFF WBC: CPT

## 2017-10-25 PROCEDURE — 94640 AIRWAY INHALATION TREATMENT: CPT

## 2017-10-25 PROCEDURE — 80197 ASSAY OF TACROLIMUS: CPT

## 2017-10-25 PROCEDURE — 99900035 HC TECH TIME PER 15 MIN (STAT)

## 2017-10-25 PROCEDURE — 82977 ASSAY OF GGT: CPT

## 2017-10-25 PROCEDURE — 63600175 PHARM REV CODE 636 W HCPCS: Performed by: TRANSPLANT SURGERY

## 2017-10-25 PROCEDURE — 94799 UNLISTED PULMONARY SVC/PX: CPT

## 2017-10-25 PROCEDURE — 99232 SBSQ HOSP IP/OBS MODERATE 35: CPT | Mod: ,,, | Performed by: INTERNAL MEDICINE

## 2017-10-25 PROCEDURE — 80053 COMPREHEN METABOLIC PANEL: CPT

## 2017-10-25 PROCEDURE — 85610 PROTHROMBIN TIME: CPT

## 2017-10-25 PROCEDURE — 85730 THROMBOPLASTIN TIME PARTIAL: CPT

## 2017-10-25 PROCEDURE — 25000003 PHARM REV CODE 250: Performed by: PHYSICIAN ASSISTANT

## 2017-10-25 PROCEDURE — 82248 BILIRUBIN DIRECT: CPT

## 2017-10-25 PROCEDURE — 20600001 HC STEP DOWN PRIVATE ROOM

## 2017-10-25 RX ORDER — IPRATROPIUM BROMIDE AND ALBUTEROL SULFATE 2.5; .5 MG/3ML; MG/3ML
3 SOLUTION RESPIRATORY (INHALATION)
Status: DISCONTINUED | OUTPATIENT
Start: 2017-10-25 | End: 2017-10-30

## 2017-10-25 RX ORDER — MYCOPHENOLATE MOFETIL 250 MG/1
1000 CAPSULE ORAL 2 TIMES DAILY
Status: DISCONTINUED | OUTPATIENT
Start: 2017-10-25 | End: 2017-11-08 | Stop reason: HOSPADM

## 2017-10-25 RX ORDER — VALGANCICLOVIR HYDROCHLORIDE 50 MG/ML
100 POWDER, FOR SOLUTION ORAL
Status: DISCONTINUED | OUTPATIENT
Start: 2017-10-27 | End: 2017-10-25

## 2017-10-25 RX ORDER — ONDANSETRON 4 MG/1
4 TABLET, FILM COATED ORAL ONCE
Status: COMPLETED | OUTPATIENT
Start: 2017-10-25 | End: 2017-10-25

## 2017-10-25 RX ORDER — DAPSONE 100 MG/1
100 TABLET ORAL DAILY
Status: DISCONTINUED | OUTPATIENT
Start: 2017-10-25 | End: 2017-10-26

## 2017-10-25 RX ORDER — VALGANCICLOVIR HYDROCHLORIDE 50 MG/ML
100 POWDER, FOR SOLUTION ORAL
Status: DISCONTINUED | OUTPATIENT
Start: 2017-10-25 | End: 2017-11-08 | Stop reason: HOSPADM

## 2017-10-25 RX ADMIN — IPRATROPIUM BROMIDE AND ALBUTEROL SULFATE 3 ML: .5; 3 SOLUTION RESPIRATORY (INHALATION) at 08:10

## 2017-10-25 RX ADMIN — FAMOTIDINE 20 MG: 20 TABLET, FILM COATED ORAL at 09:10

## 2017-10-25 RX ADMIN — DAPSONE 100 MG: 25 TABLET ORAL at 11:10

## 2017-10-25 RX ADMIN — METHYLPREDNISOLONE SODIUM SUCCINATE 20 MG: 40 INJECTION, POWDER, FOR SOLUTION INTRAMUSCULAR; INTRAVENOUS at 09:10

## 2017-10-25 RX ADMIN — MYCOPHENOLATE MOFETIL 1000 MG: 250 CAPSULE ORAL at 10:10

## 2017-10-25 RX ADMIN — OXYCODONE HYDROCHLORIDE 5 MG: 5 TABLET ORAL at 04:10

## 2017-10-25 RX ADMIN — TACROLIMUS 1 MG: 1 CAPSULE ORAL at 06:10

## 2017-10-25 RX ADMIN — HEPARIN SODIUM 5000 UNITS: 5000 INJECTION, SOLUTION INTRAVENOUS; SUBCUTANEOUS at 09:10

## 2017-10-25 RX ADMIN — QUETIAPINE FUMARATE 75 MG: 25 TABLET, FILM COATED ORAL at 09:10

## 2017-10-25 RX ADMIN — MORPHINE 100 MG: 10 SOLUTION ORAL at 10:10

## 2017-10-25 RX ADMIN — IPRATROPIUM BROMIDE AND ALBUTEROL SULFATE 3 ML: .5; 3 SOLUTION RESPIRATORY (INHALATION) at 02:10

## 2017-10-25 RX ADMIN — FLUCONAZOLE 200 MG: 200 TABLET ORAL at 08:10

## 2017-10-25 RX ADMIN — METHYLPREDNISOLONE SODIUM SUCCINATE 20 MG: 40 INJECTION, POWDER, FOR SOLUTION INTRAMUSCULAR; INTRAVENOUS at 08:10

## 2017-10-25 RX ADMIN — MYCOPHENOLATE MOFETIL 1000 MG: 250 CAPSULE ORAL at 09:10

## 2017-10-25 RX ADMIN — OXYCODONE HYDROCHLORIDE 10 MG: 5 TABLET ORAL at 11:10

## 2017-10-25 RX ADMIN — ONDANSETRON 4 MG: 4 TABLET, FILM COATED ORAL at 11:10

## 2017-10-25 RX ADMIN — OXYCODONE HYDROCHLORIDE 5 MG: 5 TABLET ORAL at 06:10

## 2017-10-25 RX ADMIN — HEPARIN SODIUM 5000 UNITS: 5000 INJECTION, SOLUTION INTRAVENOUS; SUBCUTANEOUS at 02:10

## 2017-10-25 RX ADMIN — HEPARIN SODIUM 5000 UNITS: 5000 INJECTION, SOLUTION INTRAVENOUS; SUBCUTANEOUS at 05:10

## 2017-10-25 RX ADMIN — TACROLIMUS 1 MG: 1 CAPSULE ORAL at 08:10

## 2017-10-25 NOTE — PROGRESS NOTES
Update:  EZEKIEL met with pt and pt's mother Sanjuanita ([phone]) in order to provide continuity of care and resources. Pt was alert and oriented x 4 with improved mentation compared to earlier this week. Pt was lying in bed and nurse was helping to readjust the patient to make him more comfortable. Pt reports that he is doing well today and denied any coping issues at this time. Pt's mother reports that she is still having a difficult time hearing back from [facility] and [facility] regarding whether or not discharge summaries from [facility] were able to be sent to [facility]. EZEKIEL asked pt to sign a ALL allowing [facility] to receive the discharge summary and transfer summary from [facility]. Pt was able to sign ALL. Once records are received EZEKIEL will notify pt's mother who reports that she will give SW name of fax# at [facility] and SW will fax records directly to them. EZEKIEL stated that she would follow up with pt and mother once records received. At this time, pt and mother had no further questions or concerns. EZEKIEL remains available for continued psychosocial support, education, resources, and additional d/c planning as needed.    EZEKIEL faxed ALL to [facility] Medical Records Dept (ph#[phone] fax#[phone]). EZEKIEL spoke with Danna in Medical Records to confirm fax #. EZEKIEL remains available.

## 2017-10-25 NOTE — SUBJECTIVE & OBJECTIVE
Interval History:   Patient evaluated at bedside, hemodynamically stable, no respiratory distress, had dialysis yesterday and was tolerated, intake 1.5 L and total output 3.5 L    Review of patient's allergies indicates:   Allergen Reactions    Bactrim [sulfamethoxazole-trimethoprim] Other (See Comments)     Mookie Trell Syndrome     Current Facility-Administered Medications   Medication Frequency    0.9%  NaCl infusion (for blood administration) Q24H PRN    0.9%  NaCl infusion (for blood administration) Q24H PRN    0.9%  NaCl infusion (for blood administration) Q24H PRN    0.9%  NaCl infusion PRN    0.9%  NaCl infusion Once    albuterol-ipratropium 2.5mg-0.5mg/3mL nebulizer solution 3 mL Q6H WAKE    dapsone tablet 100 mg Daily    dextrose 50% injection 12.5 g PRN    dextrose 50% injection 25 g PRN    ergocalciferol capsule 50,000 Units Q7 Days    famotidine tablet 20 mg QHS    fluconazole tablet 200 mg Daily    glucagon (human recombinant) injection 1 mg PRN    glucose chewable tablet 16 g PRN    glucose chewable tablet 24 g PRN    heparin (porcine) injection 5,000 Units Q8H    hydrALAZINE injection 10 mg Once    hydromorphone injection 0.5 mg Q6H PRN    insulin aspart pen 0-5 Units QID (AC + HS) PRN    methylPREDNISolone sodium succinate injection 20 mg Q12H    Followed by    [START ON 10/26/2017] predniSONE tablet 20 mg Daily    mycophenolate capsule 1,000 mg BID    oxycodone immediate release tablet 10 mg Q6H PRN    oxycodone immediate release tablet 5 mg Q6H PRN    quetiapine tablet 75 mg QHS    tacrolimus capsule 1 mg BID    valganciclovir 50 mg/ml oral solution 100 mg Every Mon, Wed, Fri       Objective:     Vital Signs (Most Recent):  Temp: 98 °F (36.7 °C) (10/25/17 1500)  Pulse: 90 (10/25/17 1500)  Resp: 18 (10/25/17 1500)  BP: (!) 109/58 (10/25/17 1400)  SpO2: 99 % (10/25/17 1500)  O2 Device (Oxygen Therapy): room air (10/25/17 1457) Vital Signs (24h Range):  Temp:  [98 °F  (36.7 °C)-98.3 °F (36.8 °C)] 98 °F (36.7 °C)  Pulse:  [] 90  Resp:  [16-20] 18  SpO2:  [96 %-100 %] 99 %  BP: (108-130)/(58-97) 109/58     Weight: 86 kg (189 lb 9.5 oz) (10/24/17 1100)  Body mass index is 29.69 kg/m².  Body surface area is 2.02 meters squared.    I/O last 3 completed shifts:  In: 2748 [P.O.:1020; I.V.:100; Blood:628; Other:1000]  Out: 4580 [Other:4580]    Physical Exam   Constitutional: He is oriented to person, place, and time. He appears well-developed and well-nourished. He is easily aroused. No distress. He is sedated and not intubated.   HENT:   Head: Normocephalic and atraumatic.   Eyes: Conjunctivae and EOM are normal. Pupils are equal, round, and reactive to light. Scleral icterus is present.   Neck: Trachea normal and normal range of motion. Neck supple. No JVD present.   Cardiovascular: Normal rate, regular rhythm, S1 normal, S2 normal, normal heart sounds, intact distal pulses and normal pulses.  Exam reveals no gallop and no friction rub.    No murmur heard.  Upper and lower ptting edema   Pulmonary/Chest: Effort normal and breath sounds normal. He is not intubated.   Abdominal: Soft. Bowel sounds are normal. He exhibits distension.   Musculoskeletal: Normal range of motion. He exhibits edema. He exhibits no deformity.   Neurological: He is alert, oriented to person, place, and time and easily aroused.   Skin: Skin is warm and dry. Capillary refill takes less than 2 seconds. He is not diaphoretic.   Psychiatric: He has a normal mood and affect. His behavior is normal.   Vitals reviewed.      Significant Labs:  ABGs:   Recent Labs  Lab 10/22/17  1523   PH 7.463*   PCO2 41.3   HCO3 29.6*   POCSATURATED 99   BE 6     BMP:   Recent Labs  Lab 10/22/17  0809  10/25/17  0325   GLU  --   < > 118*   CL  --   < > 104   CO2  --   < > 21*   BUN  --   < > 29*   CREATININE  --   < > 2.3*   CALCIUM  --   < > 7.7*   MG 2.5  2.5  --   --    < > = values in this interval not displayed.  CBC:    Recent Labs  Lab 10/25/17  0325   WBC 8.76   RBC 2.69*   HGB 8.3*   HCT 23.7*   PLT 85*   MCV 88   MCH 30.9   MCHC 35.0     CMP:   Recent Labs  Lab 10/25/17  0325   *   CALCIUM 7.7*   ALBUMIN 2.0*   PROT 4.1*   *   K 4.0   CO2 21*      BUN 29*   CREATININE 2.3*   ALKPHOS 234*   ALT 37   AST 84*  84*   BILITOT 5.6*     All labs within the past 24 hours have been reviewed.

## 2017-10-25 NOTE — PROGRESS NOTES
[facility]  Nephrology  Progress Note    Patient Name: Jhonny Diana  MRN: [id number]  Admission Date: 9/27/2017  Hospital Length of Stay: 28 days  Attending Provider: Solis Chapa MD   Primary Care Physician: Provider Notinsystem  Principal Problem:Liver replaced by transplant    Subjective:     HPI: Jhonny Diana is a 28 yo male with pMHX of childhood asthma and alcohol abuse who was was transferred to [facility] for hepatology evaluation for worsening liver functions.  Information was obtained from family at bedside and review of outside records, given that patient is obtunded.  According to mother at bedside, patient has had multiple admissions to the hospital over the past 6 months for recurrent abdominal pain.  She reports that he has been been seen the ED and has been diagnosed with diverticulitis, UTIs, gastroenteritis, and cholecystitis.  He was never admitted to the hospital and was always discharged.  On 09/06, patient presented to OSH for worsening abdominal pain and distension.  He was diagnosed at that time with ETOH hepatitis.  He was treated with steroids, ceftriaxone for SBP prophylaxis, and diuresed at that time for volume management.  He was discharged home on the 15th, after clearance from GI,a nd was instructed to follow-up with his PCP 1 week after discharge.  He presented to the hospital on 09/25 for worsening abdominal pain and distension with 20 kg weight gain.  He was transferred to [facility] for further care and Nephrology was consulted for LAURA.    On review of outside records, patient Scr on admission was 1.5 (with baseline at 1.0).  According to the family at the bedside, he was not urinating well at the OSH, and not responding to diuretics (spirionolactone/lasix combo).  His mother reports that patient continued to c/o abdominal and inability to urinate, but no chicas was placed until transfer to [facility] yesterday.  Record reviews shows relative hypotension with BPs in the low 90's.  His  mother reports that the nursing staff was administrating his narcotic regimen based on his blood pressures, and at times, medication was held due to hypotension.  Since admission to hospital, his UOP has improved, responding well in Lasix 40 IV (770 ml since admission, 700 ml so far this shift at time of consultation).  Admit labs revealed a SCr of 2.6, this morning improved down to 2.4.  Bilirubin remains elevated, >14.  Abdomen remains with tight distension, unable to perform paracentesis, as no significant fluid pocket found.  Mother reports patient did not have para's done at OSH.    Labs on the morning of 09/27 with bicarb of 15.  ABG with alkalemia and primary respiratory alkalosis and anion gap acidosis.     Interval History:   Patient evaluated at bedside, hemodynamically stable, no respiratory distress, had dialysis yesterday and was tolerated, intake 1.5 L and total output 3.5 L    Review of patient's allergies indicates:   Allergen Reactions    Bactrim [sulfamethoxazole-trimethoprim] Other (See Comments)     Mookie Trell Syndrome     Current Facility-Administered Medications   Medication Frequency    0.9%  NaCl infusion (for blood administration) Q24H PRN    0.9%  NaCl infusion (for blood administration) Q24H PRN    0.9%  NaCl infusion (for blood administration) Q24H PRN    0.9%  NaCl infusion PRN    0.9%  NaCl infusion Once    albuterol-ipratropium 2.5mg-0.5mg/3mL nebulizer solution 3 mL Q6H WAKE    dapsone tablet 100 mg Daily    dextrose 50% injection 12.5 g PRN    dextrose 50% injection 25 g PRN    ergocalciferol capsule 50,000 Units Q7 Days    famotidine tablet 20 mg QHS    fluconazole tablet 200 mg Daily    glucagon (human recombinant) injection 1 mg PRN    glucose chewable tablet 16 g PRN    glucose chewable tablet 24 g PRN    heparin (porcine) injection 5,000 Units Q8H    hydrALAZINE injection 10 mg Once    hydromorphone injection 0.5 mg Q6H PRN    insulin aspart pen 0-5 Units  QID (AC + HS) PRN    methylPREDNISolone sodium succinate injection 20 mg Q12H    Followed by    [START ON 10/26/2017] predniSONE tablet 20 mg Daily    mycophenolate capsule 1,000 mg BID    oxycodone immediate release tablet 10 mg Q6H PRN    oxycodone immediate release tablet 5 mg Q6H PRN    quetiapine tablet 75 mg QHS    tacrolimus capsule 1 mg BID    valganciclovir 50 mg/ml oral solution 100 mg Every Mon, Wed, Fri       Objective:     Vital Signs (Most Recent):  Temp: 98 °F (36.7 °C) (10/25/17 1500)  Pulse: 90 (10/25/17 1500)  Resp: 18 (10/25/17 1500)  BP: (!) 109/58 (10/25/17 1400)  SpO2: 99 % (10/25/17 1500)  O2 Device (Oxygen Therapy): room air (10/25/17 1457) Vital Signs (24h Range):  Temp:  [98 °F (36.7 °C)-98.3 °F (36.8 °C)] 98 °F (36.7 °C)  Pulse:  [] 90  Resp:  [16-20] 18  SpO2:  [96 %-100 %] 99 %  BP: (108-130)/(58-97) 109/58     Weight: 86 kg (189 lb 9.5 oz) (10/24/17 1100)  Body mass index is 29.69 kg/m².  Body surface area is 2.02 meters squared.    I/O last 3 completed shifts:  In: 2748 [P.O.:1020; I.V.:100; Blood:628; Other:1000]  Out: 4580 [Other:4580]    Physical Exam   Constitutional: He is oriented to person, place, and time. He appears well-developed and well-nourished. He is easily aroused. No distress. He is sedated and not intubated.   HENT:   Head: Normocephalic and atraumatic.   Eyes: Conjunctivae and EOM are normal. Pupils are equal, round, and reactive to light. Scleral icterus is present.   Neck: Trachea normal and normal range of motion. Neck supple. No JVD present.   Cardiovascular: Normal rate, regular rhythm, S1 normal, S2 normal, normal heart sounds, intact distal pulses and normal pulses.  Exam reveals no gallop and no friction rub.    No murmur heard.  Upper and lower ptting edema   Pulmonary/Chest: Effort normal and breath sounds normal. He is not intubated.   Abdominal: Soft. Bowel sounds are normal. He exhibits distension.   Musculoskeletal: Normal range of motion.  He exhibits edema. He exhibits no deformity.   Neurological: He is alert, oriented to person, place, and time and easily aroused.   Skin: Skin is warm and dry. Capillary refill takes less than 2 seconds. He is not diaphoretic.   Psychiatric: He has a normal mood and affect. His behavior is normal.   Vitals reviewed.      Significant Labs:  ABGs:   Recent Labs  Lab 10/22/17  1523   PH 7.463*   PCO2 41.3   HCO3 29.6*   POCSATURATED 99   BE 6     BMP:   Recent Labs  Lab 10/22/17  0809  10/25/17  0325   GLU  --   < > 118*   CL  --   < > 104   CO2  --   < > 21*   BUN  --   < > 29*   CREATININE  --   < > 2.3*   CALCIUM  --   < > 7.7*   MG 2.5  2.5  --   --    < > = values in this interval not displayed.  CBC:   Recent Labs  Lab 10/25/17  0325   WBC 8.76   RBC 2.69*   HGB 8.3*   HCT 23.7*   PLT 85*   MCV 88   MCH 30.9   MCHC 35.0     CMP:   Recent Labs  Lab 10/25/17  0325   *   CALCIUM 7.7*   ALBUMIN 2.0*   PROT 4.1*   *   K 4.0   CO2 21*      BUN 29*   CREATININE 2.3*   ALKPHOS 234*   ALT 37   AST 84*  84*   BILITOT 5.6*     All labs within the past 24 hours have been reviewed.       Assessment/Plan:     LAURA (acute kidney injury)    S/p Liver transplant and HD during surgery    - likely ischemic ATN from decreased renal perfusion (volume shifts from paracentesis, anemia)  - urine sediment with muddy brown casts  - Last dialysis treatment was on Saturday and Sunday SLED was hold   - Serum creatinine worsened compared with yesterday from 2.5-->2.3  - Patient continues to be anuric   - No need for dialysis today , will schedule for tomorrow   - Need bladder scan in the morning    - No need for O2 today             Win Sandoval  Nephrology  Fellow  [facility]    Pager [phone],    ATTENDING PHYSICIAN ATTESTATION  I have personally interviewed and examined the patient. I thoroughly reviewed the demographic, clinical, laboratorial and imaging information  available in medical records. I agree with the assessment and recommendations provided by the subspecialty resident. Dr. Gentile was under my supervision.

## 2017-10-25 NOTE — PROGRESS NOTES
[facility]  Transplant Surgery  Progress Note      Hospital Day Hospital Day: 29  Post-Op Day 6 Days Post-Op    Admit Diagnosis: ESLD  Procedures: OLTx    ORGAN:   LIVER  Disease Etiology: Acute Alcoholic Hepatitis  Donor Type:    - Brain Death  CDC High Risk:   No  Donor CMV Status:   Donor CMV Status: Positive  Donor HBcAB:   Negative  Donor HCV Status:   Negative  Whole or Partial: Whole Liver  Biliary Anastomosis: End to End  Arterial Anatomy: Standard      Subjective:     Interval History:   No acute events last night.  Pain well controlled. Confusion has resolved. Out of bed to chair and awake most of day yesterday, slept all night last night. Anuric. Tolerated 3h HD yesterday with 3L removed after trialysis line in RIJ was changed over a wire. Tolerating renal diet. Rec'd 1pk platelets yesterday while changing HD line over wire.     Medications:  Continuous Infusions:     Scheduled Meds:   sodium chloride 0.9%   Intravenous Once    ergocalciferol  50,000 Units Oral Q7 Days    famotidine  20 mg Oral QHS    fluconazole  200 mg Oral Daily    heparin (porcine)  5,000 Units Subcutaneous Q8H    hydrALAZINE  10 mg Intravenous Once    methylPREDNISolone sodium succinate  20 mg Intravenous Q12H    Followed by    [START ON 10/26/2017] predniSONE  20 mg Oral Daily    mupirocin  1 g Nasal BID    mycophenolate mofetil  1,000 mg Oral BID    QUEtiapine  75 mg Oral QHS    tacrolimus  1 mg Oral BID    valganciclovir 50 mg/ml  100 mg Per NG tube Every Mon, Wed, Fri     PRN Meds:sodium chloride, sodium chloride, sodium chloride, sodium chloride 0.9%, dextrose 50%, dextrose 50%, glucagon (human recombinant), glucose, glucose, HYDROmorphone, insulin aspart, oxyCODONE, oxyCODONE     Objective:     Vital Signs (Most Recent):  Temp: 98.1 °F (36.7 °C) (10/25/17 0300)  Pulse: 92 (10/25/17 0500)  Resp: 16 (10/25/17 0500)  BP: 118/68 (10/25/17 0500)  SpO2: 99 % (10/25/17 0500) Vital Signs (24h  Range):  Temp:  [97.6 °F (36.4 °C)-98.5 °F (36.9 °C)] 98.1 °F (36.7 °C)  Pulse:  [] 92  Resp:  [16-20] 16  SpO2:  [95 %-100 %] 99 %  BP: (108-139)/(65-97) 118/68  Arterial Line BP: (123-149)/(64-76) 147/72       Intake/Output Summary (Last 24 hours) at 10/25/17 0523  Last data filed at 10/25/17 0300   Gross per 24 hour   Intake             1568 ml   Output             3500 ml   Net            -1932 ml       Neuro:  GCS: 15  Sedation:  no     Restraints:  no  Neurologic: Mental status: follows commands, confusion improving    Cardiac:      Temp:  [97.6 °F (36.4 °C)-98.5 °F (36.9 °C)]   Pulse:  []   Resp:  [16-20]   BP: (108-139)/(65-97)   SpO2:  [95 %-100 %]   Arterial Line BP: (123-149)/(64-76)   Heart: regular rate and rhythm, S1, S2 normal, no murmur, rub or gallop  Pulses: 2+ and symmetric    Pulmonary: clinically no increased work of breathing, sats >92% on room air    Recent Labs  Lab 10/22/17  1523   PH 7.463*   PO2 121*   PCO2 41.3   HCO3 29.6*   BE 6       Lungs:  Clear to auscultation bilaterally  Chest X-ray: worsening R pleural effusion    Renal:   I & O (Last 24H):    Intake/Output Summary (Last 24 hours) at 10/25/17 0523  Last data filed at 10/25/17 0300   Gross per 24 hour   Intake             1568 ml   Output             3500 ml   Net            -1932 ml       Recent Labs  Lab 10/23/17  2000 10/24/17  0420 10/25/17  0325   BUN 26* 33* 29*   CREATININE 2.0* 2.5* 2.3*   K 4.0 4.1 4.0       Recent Labs  Lab 10/23/17  2000 10/24/17  0420 10/25/17  0325   * 134* 134*   K 4.0 4.1 4.0    103 104   CO2 24 23 21*   CALCIUM 7.5* 7.8* 7.7*   * 111* 118*       Recent Labs  Lab 10/21/17  1359 10/21/17  2200 10/21/17  2330 10/22/17  0400 10/22/17  0809   PHOS 3.1 3.3  3.3  --  3.2  3.2  --    MG 1.9  --  2.7*  2.7*  --  2.5  2.5       FEN/GI    Recent Labs  Lab 10/23/17  2000 10/24/17  0420 10/25/17  0325   BILITOT 7.3* 6.8* 5.6*   *  139* 110* 84*  84*   ALT 68* 56* 37    ALKPHOS 166* 172* 234*   ALBUMIN 2.0* 1.9* 2.0*       Recent Labs  Lab 10/23/17  2000 10/24/17  0420 10/25/17  0325   INR 1.0 1.0 1.0      Nutrition: PO: Full Regular and renal  Abdomen: expected post-operative tenderness and chevron with no drsg soakthrough, drains removed  GI ulcer Prophylaxis: yes    HEME:     Recent Labs  Lab 10/23/17  2330 10/24/17  0420 10/25/17  0325   WBC 11.39 7.30 8.76   HGB 8.8* 8.6* 8.3*   HCT 25.5* 24.5* 23.7*   PLT 40* 37* 85*     DVT Prophylaxis: Pharmacologic yes  Mechanical: yes     Infectious Disease:  Temp:  [97.6 °F (36.4 °C)-98.5 °F (36.9 °C)]   Pulse:  []   Resp:  [16-20]   BP: (108-139)/(65-97)   SpO2:  [95 %-100 %]   Arterial Line BP: (123-149)/(64-76)   Antibiotics     Start     Stop Route Frequency Ordered    10/20/17 0900  mupirocin 2 % ointment 1 g      10/25 0859 Nasl 2 times daily 10/20/17 0346            Significant Diagnostics:  U/S: I have reviewed all pertinent results/findings within the past 24 hours. .     Findings: The liver measures 15.1cm.  Hepatic parenchyma is homogeneous without evidence for masses.  No intrahepatic biliary ductal dilatation. The common bile duct measures 0.3 cm. There is mild ascites. There is a peritransplant fluid collection anterior to the right hepatic lobe which has not significantly changed from the prior study and measures 7.6 x 7.8 x 2.4 cm.     Color Doppler images demonstrate appropriate flow in the main portal vein and its branches, all 3 hepatic veins, and the IVC.  Technically difficult exam with difficulty visualizing the arterial system. The main hepatic artery is patent without tardus parvus waveform. The main hepatic artery peak systolic velocity is 31 cm/sec, previously 77 cm/s.  Slow flow is noted throughout the hepatic arterial system. Hepatic artery resistive indices are mostly within normal limits and as follows:   Main hepatic artery: 1.0, previously 1.0  Left hepatic artery: 0.63, previously 1.0   Right  hepatic artery, Anterior: 0.79, previously 1.0   Right hepatic artery, Posterior: Approximately 0.7 (calculated manually), previously 0.63    Assessment/Plan:   Jhonny Diana is a 27 y.o. male s/p OLTx for acute alcoholic hepatitis.    Neuro: Post-operative pain  - Continue current pain control regimen- oxycodone and dilaudid for breakthrough  - OOBTC, prevent day/night reversal - Ot of bed in chair, blinds up, lights and TV on during day. Lights off and TV off at night.  - seroquel nightly    Resp:  - Extubated 10/21/17  - Minimize supplemental O2 requirements  - aggressive pulmonary toilet: Encourage IS, deep breathing, cough  - may need to have the R pleural effusion drained although now is clinically stable, need to continue with fluid removal on HD    CV:  - HDS  - No current vasopressor requirement  - prn for hypertension, unless sustained in which case will start nifedipine    Heme: Anemia  - Hgb/Hct stable at 8.3/23.7  - Continue to trend    ID:  - on meropenem from Kleb in sputum 1/6, 1st dose periop and d/c'd 10/13  - G6PD normal - start dapsone today in substitute for Bactrim  - note: there were a few serosal tears due to dense adhesions, which were over sewn  - newer cultures NGTD    Immuno: Immunocompromised state  - Continue current immunosuppression regimen - steroids, cellcept, prograf  - Daily Prograf level - continue on 1 mg BID  - Continue opportunistic infection prophylaxis - Valcyte, Dapsone, Nystatin    Renal:  - d/c'd chicas and serial bladder scan  - Strict I/Os  - appreciate nephrology recs  - Trend CVP    FEN/GI: ESLD s/p OLT, transaminitis, protein calorie malnutrition  - Liver U/S yesterday showed stable fluid collection and some slowed arterial flow  - d/c'd abdominal drains  - Trend LFTs - still downtrending  - Replace lytes PRN    Endo: Hyperglycemia  - Endocrine following, appreciate assistance  - Accuchecks  - Insulin gtt weaned off per endocrine    Dispo: stepdown orders for TSU are  in

## 2017-10-25 NOTE — ASSESSMENT & PLAN NOTE
S/p Liver transplant and HD during surgery    - likely ischemic ATN from decreased renal perfusion (volume shifts from paracentesis, anemia)  - urine sediment with muddy brown casts  - Last dialysis treatment was on Saturday and Sunday SLED was hold   - Serum creatinine worsened compared with yesterday from 2.5-->2.3  - Patient continues to be anuric   - No need for dialysis today , will schedule for tomorrow   - Need bladder scan in the morning    - No need for O2 today

## 2017-10-26 PROBLEM — D68.9 COAGULOPATHY: Status: RESOLVED | Noted: 2017-10-03 | Resolved: 2017-10-26

## 2017-10-26 PROBLEM — D72.829 LEUKOCYTOSIS: Status: RESOLVED | Noted: 2017-10-03 | Resolved: 2017-10-26

## 2017-10-26 PROBLEM — Z91.89 AT RISK FOR OPPORTUNISTIC INFECTIONS: Status: ACTIVE | Noted: 2017-10-26

## 2017-10-26 PROBLEM — K70.11 ALCOHOLIC HEPATITIS WITH ASCITES: Status: RESOLVED | Noted: 2017-10-24 | Resolved: 2017-10-26

## 2017-10-26 PROBLEM — R53.81 PHYSICAL DECONDITIONING: Status: ACTIVE | Noted: 2017-10-26

## 2017-10-26 PROBLEM — R73.9 HYPERGLYCEMIA: Status: RESOLVED | Noted: 2017-10-21 | Resolved: 2017-10-26

## 2017-10-26 LAB
ABO + RH BLD: NORMAL
ALBUMIN SERPL BCP-MCNC: 2 G/DL
ALP SERPL-CCNC: 197 U/L
ALT SERPL W/O P-5'-P-CCNC: 25 U/L
ANION GAP SERPL CALC-SCNC: 9 MMOL/L
APTT BLDCRRT: 24.5 SEC
APTT BLDCRRT: 24.5 SEC
AST SERPL-CCNC: 57 U/L
AST SERPL-CCNC: 57 U/L
BACTERIA SPEC ANAEROBE CULT: NORMAL
BASOPHILS # BLD AUTO: 0 K/UL
BASOPHILS NFR BLD: 0 %
BILIRUB DIRECT SERPL-MCNC: 3.5 MG/DL
BILIRUB SERPL-MCNC: 4.5 MG/DL
BLD GP AB SCN CELLS X3 SERPL QL: NORMAL
BLD PROD TYP BPU: NORMAL
BLOOD UNIT EXPIRATION DATE: NORMAL
BLOOD UNIT TYPE CODE: 5100
BLOOD UNIT TYPE: NORMAL
BUN SERPL-MCNC: 51 MG/DL
CALCIUM SERPL-MCNC: 7.5 MG/DL
CHLORIDE SERPL-SCNC: 101 MMOL/L
CO2 SERPL-SCNC: 21 MMOL/L
CODING SYSTEM: NORMAL
CREAT SERPL-MCNC: 3.5 MG/DL
DIFFERENTIAL METHOD: ABNORMAL
DISPENSE STATUS: NORMAL
EOSINOPHIL # BLD AUTO: 0 K/UL
EOSINOPHIL NFR BLD: 0.1 %
ERYTHROCYTE [DISTWIDTH] IN BLOOD BY AUTOMATED COUNT: 19.9 %
EST. GFR  (AFRICAN AMERICAN): 25.9 ML/MIN/1.73 M^2
EST. GFR  (NON AFRICAN AMERICAN): 22.4 ML/MIN/1.73 M^2
GGT SERPL-CCNC: 536 U/L
GLUCOSE SERPL-MCNC: 114 MG/DL
HCT VFR BLD AUTO: 20.7 %
HCT VFR BLD AUTO: 21.4 %
HGB BLD-MCNC: 7.1 G/DL
HGB BLD-MCNC: 7.5 G/DL
IMM GRANULOCYTES # BLD AUTO: 0.15 K/UL
IMM GRANULOCYTES NFR BLD AUTO: 1.7 %
INR PPP: 1
LYMPHOCYTES # BLD AUTO: 0.8 K/UL
LYMPHOCYTES NFR BLD: 8.6 %
MCH RBC QN AUTO: 30.5 PG
MCHC RBC AUTO-ENTMCNC: 34.3 G/DL
MCV RBC AUTO: 89 FL
MONOCYTES # BLD AUTO: 0.6 K/UL
MONOCYTES NFR BLD: 7.2 %
NEUTROPHILS # BLD AUTO: 7.2 K/UL
NEUTROPHILS NFR BLD: 82.4 %
NRBC BLD-RTO: 0 /100 WBC
PLATELET # BLD AUTO: 70 K/UL
PMV BLD AUTO: 10.5 FL
POCT GLUCOSE: 102 MG/DL (ref 70–110)
POCT GLUCOSE: 121 MG/DL (ref 70–110)
POCT GLUCOSE: 123 MG/DL (ref 70–110)
POTASSIUM SERPL-SCNC: 4.2 MMOL/L
PROT SERPL-MCNC: 3.9 G/DL
PROTHROMBIN TIME: 10.6 SEC
RBC # BLD AUTO: 2.33 M/UL
SODIUM SERPL-SCNC: 131 MMOL/L
TACROLIMUS BLD-MCNC: 4 NG/ML
TRANS ERYTHROCYTES VOL PATIENT: NORMAL ML
WBC # BLD AUTO: 8.79 K/UL

## 2017-10-26 PROCEDURE — 99233 SBSQ HOSP IP/OBS HIGH 50: CPT | Mod: 24,,, | Performed by: PHYSICIAN ASSISTANT

## 2017-10-26 PROCEDURE — 63600175 PHARM REV CODE 636 W HCPCS: Performed by: STUDENT IN AN ORGANIZED HEALTH CARE EDUCATION/TRAINING PROGRAM

## 2017-10-26 PROCEDURE — 63600175 PHARM REV CODE 636 W HCPCS: Performed by: PHYSICIAN ASSISTANT

## 2017-10-26 PROCEDURE — 20600001 HC STEP DOWN PRIVATE ROOM

## 2017-10-26 PROCEDURE — P9021 RED BLOOD CELLS UNIT: HCPCS

## 2017-10-26 PROCEDURE — 82977 ASSAY OF GGT: CPT

## 2017-10-26 PROCEDURE — 82248 BILIRUBIN DIRECT: CPT

## 2017-10-26 PROCEDURE — 80053 COMPREHEN METABOLIC PANEL: CPT

## 2017-10-26 PROCEDURE — 25000003 PHARM REV CODE 250: Performed by: INTERNAL MEDICINE

## 2017-10-26 PROCEDURE — 86900 BLOOD TYPING SEROLOGIC ABO: CPT

## 2017-10-26 PROCEDURE — 25000003 PHARM REV CODE 250: Performed by: TRANSPLANT SURGERY

## 2017-10-26 PROCEDURE — 63600175 PHARM REV CODE 636 W HCPCS: Performed by: TRANSPLANT SURGERY

## 2017-10-26 PROCEDURE — 94761 N-INVAS EAR/PLS OXIMETRY MLT: CPT

## 2017-10-26 PROCEDURE — 85014 HEMATOCRIT: CPT

## 2017-10-26 PROCEDURE — 85610 PROTHROMBIN TIME: CPT

## 2017-10-26 PROCEDURE — 25000003 PHARM REV CODE 250: Performed by: STUDENT IN AN ORGANIZED HEALTH CARE EDUCATION/TRAINING PROGRAM

## 2017-10-26 PROCEDURE — 80197 ASSAY OF TACROLIMUS: CPT

## 2017-10-26 PROCEDURE — 85730 THROMBOPLASTIN TIME PARTIAL: CPT

## 2017-10-26 PROCEDURE — 36430 TRANSFUSION BLD/BLD COMPNT: CPT

## 2017-10-26 PROCEDURE — 94799 UNLISTED PULMONARY SVC/PX: CPT

## 2017-10-26 PROCEDURE — 85018 HEMOGLOBIN: CPT

## 2017-10-26 PROCEDURE — 85025 COMPLETE CBC W/AUTO DIFF WBC: CPT

## 2017-10-26 PROCEDURE — 86920 COMPATIBILITY TEST SPIN: CPT

## 2017-10-26 PROCEDURE — 90935 HEMODIALYSIS ONE EVALUATION: CPT

## 2017-10-26 PROCEDURE — 94640 AIRWAY INHALATION TREATMENT: CPT

## 2017-10-26 PROCEDURE — 25000003 PHARM REV CODE 250: Performed by: PHYSICIAN ASSISTANT

## 2017-10-26 PROCEDURE — 36415 COLL VENOUS BLD VENIPUNCTURE: CPT

## 2017-10-26 PROCEDURE — 25000242 PHARM REV CODE 250 ALT 637 W/ HCPCS: Performed by: STUDENT IN AN ORGANIZED HEALTH CARE EDUCATION/TRAINING PROGRAM

## 2017-10-26 PROCEDURE — 86901 BLOOD TYPING SEROLOGIC RH(D): CPT

## 2017-10-26 RX ORDER — HYDROCODONE BITARTRATE AND ACETAMINOPHEN 500; 5 MG/1; MG/1
TABLET ORAL
Status: DISCONTINUED | OUTPATIENT
Start: 2017-10-26 | End: 2017-10-30

## 2017-10-26 RX ORDER — TACROLIMUS 1 MG/1
2 CAPSULE ORAL 2 TIMES DAILY
Status: DISCONTINUED | OUTPATIENT
Start: 2017-10-26 | End: 2017-10-27

## 2017-10-26 RX ORDER — ATOVAQUONE 750 MG/5ML
1500 SUSPENSION ORAL DAILY
Status: DISCONTINUED | OUTPATIENT
Start: 2017-10-26 | End: 2017-11-08 | Stop reason: HOSPADM

## 2017-10-26 RX ORDER — SODIUM CHLORIDE 9 MG/ML
INJECTION, SOLUTION INTRAVENOUS
Status: DISCONTINUED | OUTPATIENT
Start: 2017-10-26 | End: 2017-10-26

## 2017-10-26 RX ORDER — SODIUM CHLORIDE 9 MG/ML
INJECTION, SOLUTION INTRAVENOUS ONCE
Status: DISCONTINUED | OUTPATIENT
Start: 2017-10-26 | End: 2017-10-26

## 2017-10-26 RX ADMIN — IPRATROPIUM BROMIDE AND ALBUTEROL SULFATE 3 ML: .5; 3 SOLUTION RESPIRATORY (INHALATION) at 08:10

## 2017-10-26 RX ADMIN — HEPARIN SODIUM 5000 UNITS: 5000 INJECTION, SOLUTION INTRAVENOUS; SUBCUTANEOUS at 08:10

## 2017-10-26 RX ADMIN — FAMOTIDINE 20 MG: 20 TABLET, FILM COATED ORAL at 08:10

## 2017-10-26 RX ADMIN — ATOVAQUONE 1500 MG: 750 SUSPENSION ORAL at 01:10

## 2017-10-26 RX ADMIN — MYCOPHENOLATE MOFETIL 1000 MG: 250 CAPSULE ORAL at 01:10

## 2017-10-26 RX ADMIN — IPRATROPIUM BROMIDE AND ALBUTEROL SULFATE 3 ML: .5; 3 SOLUTION RESPIRATORY (INHALATION) at 09:10

## 2017-10-26 RX ADMIN — HEPARIN SODIUM 5000 UNITS: 5000 INJECTION, SOLUTION INTRAVENOUS; SUBCUTANEOUS at 05:10

## 2017-10-26 RX ADMIN — HEPARIN SODIUM 5000 UNITS: 5000 INJECTION, SOLUTION INTRAVENOUS; SUBCUTANEOUS at 01:10

## 2017-10-26 RX ADMIN — IPRATROPIUM BROMIDE AND ALBUTEROL SULFATE 3 ML: .5; 3 SOLUTION RESPIRATORY (INHALATION) at 12:10

## 2017-10-26 RX ADMIN — MYCOPHENOLATE MOFETIL 1000 MG: 250 CAPSULE ORAL at 08:10

## 2017-10-26 RX ADMIN — OXYCODONE HYDROCHLORIDE 10 MG: 5 TABLET ORAL at 12:10

## 2017-10-26 RX ADMIN — OXYCODONE HYDROCHLORIDE 10 MG: 5 TABLET ORAL at 01:10

## 2017-10-26 RX ADMIN — FLUCONAZOLE 200 MG: 200 TABLET ORAL at 01:10

## 2017-10-26 RX ADMIN — SODIUM CHLORIDE: 900 INJECTION, SOLUTION INTRAVENOUS at 10:10

## 2017-10-26 RX ADMIN — TACROLIMUS 1 MG: 1 CAPSULE ORAL at 05:10

## 2017-10-26 RX ADMIN — TACROLIMUS 2 MG: 1 CAPSULE ORAL at 05:10

## 2017-10-26 RX ADMIN — PREDNISONE 20 MG: 10 TABLET ORAL at 01:10

## 2017-10-26 NOTE — ASSESSMENT & PLAN NOTE
- 2/2 liver disease, LAURA, and hypoalbuminemia  - Expect improvement once kidneys improve.    - Attempt to keep dressings dry

## 2017-10-26 NOTE — PT/OT/SLP PROGRESS
Occupational Therapy      Jhonny Diana  MRN: [id number]    OT orders received and acknowledged. Therapist contacted RN prior to seeing pt. RN stated that pt just returned to room from dialysis. Rn also stated that pt has been tearful/fatigued since returning and would be best to allow pt to rest at this time and attempt evaluation tomorrow. Patient not seen today secondary to Patient fatigue and nursing care. Will follow-up tomorrow.    Samantha Lopez, KIRTI  10/26/2017

## 2017-10-26 NOTE — PROGRESS NOTES
[facility]  Liver Transplant  Progress Note    Patient Name: Jhonny Diana  MRN: [id number]  Admission Date: 2017  Hospital Length of Stay: 29 days  Code Status: Full Code  Primary Care Provider: Provider Notinsystem  Post-Operative Day: 7    ORGAN:   LIVER  Disease Etiology: Acute Alcoholic Hepatitis  Donor Type:    - Brain Death  CDC High Risk:   No  Donor CMV Status:   Donor CMV Status: Positive  Donor HBcAB:   Negative  Donor HCV Status:   Negative  Whole or Partial: Whole Liver  Biliary Anastomosis: End to End  Arterial Anatomy: Standard  Subjective:     History of Present Illness:  Jhonny Diana is a 28 yo male with hx of childhood asthma and heavy alcohol abuse (fifth of liquor daily since teenager, last drink 2.5 wks ago per mother) who was transferred to [facility] for higher level of care/liver transplant evaluation. Patient initially presented to OSH for a 2 mo hx of progressively worsening abdominal pain with associated fatigue, jaundice, and abdominal distention with ~20# weight gain.  Reports family hx of alcoholic cirrhosis in father. Pt was recently diagnosed with alcoholic hepatitis during his admission at OSH on -9/15. During that hospitalization, patient was given ceftriaxone for SBP ppx, prednisone for alcoholic hepatitis, and diuresed. Pt was discharged and instructed to follow up with PCP and AA within 1 wk. After his discharge, patient returned to ED 4 days after for increased weight gain and worsening abdominal pain/jaundice.     Mr. Diana was admitted to the CMICU on  for decompensated alcoholic hepatitis with high MELD score, severe HE, and LAURA. Hepatology consulted and followed to evaluate liver transplant candidacy. Pt started on albumin and lasix for LAURA and possibility for HRS. However, nephrology felt that LAURA is likely multifactorial: ATN given hypotensive episodes at OSH and toxic given severe hyperbilirubinemia; not truly HRS. Lactulose/rifaximin started  for HE and mental status slowly improved. Mr. Diana with significant ABD pain since admission with ABD distension. CT abdomen was significant for pericolonic fat stranding of right colon; no obstruction. Also with diffuse mesenteric edema. No significant ascites noted, no cholecystitis/cholelithiasis/ductal dilation. US liver with doppler showed appropriate vasculature.  He was stepped down to Hospital Medicine on 10/5.  Paracentesis was done on 10/9 with 3.3L removed and 10/12 with 1.8 removed, no infection. His diet was advanced and Dietary was consulted to help get adequate caloric intake. Listed for liver transplant 10/17 with MELD 36.    Pt received organ offer and underwent liver txp 10/19 which took place without complication.  Steroid induction used, CMV +/+.  Post operatively, LFTs/enzymes trended down nicely.  POD 5 U/S with elevated RIs and sluggish flow in HA system, otherwise ok.  Pt with LAURA prior to and s/p txp requiring dialysis.  He is tolerating HD with last HD 10/24.          Hospital Course:  Interval hx:  Pt feeling well today without complaint.  He reports his appetite is fair and is eating.  Pain controlled with oral pain medication, using IS, + BMs.  He is working with PT and is very week- OT ordered today.  Pt still without UOP.  Will need permcath placement next Monday if UOP does no continue to improve in prep for discharge.  Pt with low H/H today- stopped Dapsone and transitioned to Augmentin.  2 u PRBC given.    Scheduled Meds:   albuterol-ipratropium 2.5mg-0.5mg/3mL  3 mL Nebulization Q6H WAKE    atovaquone  1,500 mg Oral Daily    ergocalciferol  50,000 Units Oral Q7 Days    famotidine  20 mg Oral QHS    fluconazole  200 mg Oral Daily    heparin (porcine)  5,000 Units Subcutaneous Q8H    mycophenolate  1,000 mg Oral BID    predniSONE  20 mg Oral Daily    tacrolimus  2 mg Oral BID    valganciclovir 50 mg/ml  100 mg Oral Every Mon, Wed, Fri     Continuous Infusions:   PRN  Meds:sodium chloride, dextrose 50%, dextrose 50%, glucagon (human recombinant), glucose, glucose, insulin aspart, oxyCODONE, oxyCODONE    Review of Systems   Constitutional: Positive for activity change, appetite change and fatigue. Negative for diaphoresis and fever.   HENT: Negative for mouth sores.    Respiratory: Negative for cough, choking, shortness of breath and wheezing.    Cardiovascular: Positive for leg swelling. Negative for chest pain and palpitations.   Gastrointestinal: Positive for abdominal distention. Negative for abdominal pain, constipation, diarrhea, nausea and vomiting.   Genitourinary: Negative for decreased urine volume and difficulty urinating.   Musculoskeletal: Negative for arthralgias, back pain and joint swelling.   Skin: Positive for wound.   Allergic/Immunologic: Positive for immunocompromised state.   Neurological: Negative for tremors and weakness.   Hematological: Bruises/bleeds easily.   Psychiatric/Behavioral: Negative for confusion and sleep disturbance.   All other systems reviewed and are negative.    Objective:     Vital Signs (Most Recent):  Temp: 97.8 °F (36.6 °C) (10/26/17 1300)  Pulse: (!) 113 (10/26/17 1438)  Resp: 20 (10/26/17 1300)  BP: (!) 107/55 (10/26/17 1300)  SpO2: 97 % (10/26/17 1218) Vital Signs (24h Range):  Temp:  [97.1 °F (36.2 °C)-98.9 °F (37.2 °C)] 97.8 °F (36.6 °C)  Pulse:  [] 113  Resp:  [16-20] 20  SpO2:  [18 %-100 %] 97 %  BP: ()/(44-75) 107/55     Weight: 80.3 kg (177 lb 0.5 oz)  Body mass index is 27.73 kg/m².    Intake/Output - Last 3 Shifts       10/24 0700 - 10/25 0659 10/25 0700 - 10/26 0659 10/26 0700 - 10/27 0659    P.O. 780 1080     I.V. (mL/kg)       Blood 288  270    Other 500  600    Total Intake(mL/kg) 1568 (18.2) 1080 (13.4) 870 (10.8)    Urine (mL/kg/hr)  0 (0)     Drains       Other 3500 (1.7)  3000 (4.8)    Stool  0 (0)     Total Output 3500 0 3000    Net -1932 +1080 -2130           Urine Occurrence  0 x     Stool Occurrence  2 x 0 x           Physical Exam   Constitutional: He is oriented to person, place, and time. He appears well-developed. No distress.   Temporal and distal extremity muscle wasting   HENT:   Head: Normocephalic and atraumatic.   Mouth/Throat: No oropharyngeal exudate.   Eyes: EOM are normal. Pupils are equal, round, and reactive to light. Scleral icterus is present.   Neck: Normal range of motion. Neck supple.   Cardiovascular: Normal rate, regular rhythm and normal heart sounds.  Exam reveals no gallop and no friction rub.    No murmur heard.  Pulmonary/Chest: Effort normal. No respiratory distress. He has no rales.   Dec R base   Abdominal: Soft. Bowel sounds are normal. He exhibits distension. There is no tenderness.   Chevron inc cdi no ssi   Musculoskeletal: Normal range of motion. He exhibits edema (1-2+ generalized).   Neurological: He is alert and oriented to person, place, and time.   Skin: Skin is warm and dry. He is not diaphoretic.   jaundiced   Psychiatric: He has a normal mood and affect. His behavior is normal. Judgment and thought content normal.   Nursing note and vitals reviewed.      Laboratory:  Immunosuppressants         Stop Route Frequency     tacrolimus capsule 2 mg      -- Oral 2 times daily     mycophenolate capsule 1,000 mg      -- Oral 2 times daily        CBC:   Recent Labs  Lab 10/26/17  0500 10/26/17  0834   WBC 8.79  --    RBC 2.33*  --    HGB 7.1* 7.5*   HCT 20.7* 21.4*   PLT 70*  --    MCV 89  --    MCH 30.5  --    MCHC 34.3  --      CMP:   Recent Labs  Lab 10/26/17  0500   *   CALCIUM 7.5*   ALBUMIN 2.0*   PROT 3.9*   *   K 4.2   CO2 21*      BUN 51*   CREATININE 3.5*   ALKPHOS 197*   ALT 25   AST 57*  57*   BILITOT 4.5*     Labs within the past 24 hours have been reviewed.    Diagnostic Results:  I have personally reviewed all pertinent imaging studies.    Assessment/Plan:     * Liver transplant 10/19/2017 for acute alcoholic hepatitis    - LFTs/enzymes trended  down nicely  - Met all post surgical marks  - Liver US POD 5 with elevated RIs and slowed flow HA system- will monitor labs and repeat prior to discharge.            LAURA (acute kidney injury)    - Pt still anuric.  - Nephrology following with us.  - HD today.  - Plan for permcath placement Monday if without improvement in prep for discharge.        Long-term use of immunosuppressant medication    - See prophylactic immunotherapy          Prophylactic immunotherapy    - Continue tacrolimus, prednisone taper, Cellcept.  - Will monitor for signs of toxic side effects, check daily tacrolimus troughs, and change meds accordingly.        Acute blood loss anemia    - PRBC x 2 u today with HD  - No s/s active bleed, likely slow ooze with anemia of chronic disease        Anasarca    - 2/2 liver disease, LAURA, and hypoalbuminemia  - Expect improvement once kidneys improve.    - Attempt to keep dressings dry        At risk for opportunistic infections    - D/C Dapsone secondary to worsening anemia and start atovaquone for PCP prophylaxis  - Continue Valcyte for CMV prophylaxis  - Continue fluconazole for fungal prophylaxis          Severe malnutrition    - Dietary consulted and following.   - Continue protein powder        Alcoholism /alcohol abuse    - Psych consulted, high risk for transplant. To complete ABU program s/p transplant          Physical deconditioning    - Working with PT/OT  - Monitor for outpt needs              VTE Risk Mitigation         Ordered     heparin (porcine) injection 5,000 Units  Every 8 hours     Route:  Subcutaneous        10/20/17 0346     Place sequential compression device  Until discontinued      10/20/17 0346     High Risk of VTE  Once      10/20/17 0346          The patients clinical status was discussed at multidisplinary rounds, involving transplant surgery, transplant medicine, pharmacy, nursing, nutrition, and social work    Discharge Planning:  Monitor kidney function --> may need  outpt HD  Monitor HH needs vs rehab for deconditioned status      Geronimo Vera PA-C  Liver Transplant  [facility]

## 2017-10-26 NOTE — PROGRESS NOTES
Pt transferred to hospitals [id number] via nursing staff in a wheelchair. AAOx4. VSS. Oriented to room. Will continue to monitor.

## 2017-10-26 NOTE — ASSESSMENT & PLAN NOTE
- Pt still anuric.  - Nephrology following with us.  - HD today.  - Plan for permcath placement Monday if without improvement in prep for discharge.

## 2017-10-26 NOTE — PROGRESS NOTES
Acute HD bedside treatment started via RIJ CVC without difficulty. Orders reviewed and machine settings verified. Pt AAO x 4, denies c/o. Pre VSS. POC discussed with primary RN before HD.

## 2017-10-26 NOTE — ASSESSMENT & PLAN NOTE
- D/C Dapsone secondary to worsening anemia and start atovaquone for PCP prophylaxis  - Continue Valcyte for CMV prophylaxis  - Continue fluconazole for fungal prophylaxis

## 2017-10-26 NOTE — PROGRESS NOTES
"SW presented to patient for follow up and continuity of care.  The patient was observed to be laying in bed alert, oriented x4 and communicative while receiving at the bedside.  The patient made appropriate eye contact when communicating with SW.  The patient reported that his caregiver was currently out of the room handling "business."  Pt reported the caregiver has made some strides with his HCPOA and being able access his things with the his permission.  The FP was not complete for SW to take.  The pt and SW discussed tentative discharge plans and will revisit next week.  The pt denied any needs at this time.  Pt denied any increased mental health symptoms.  SW provided emotional support, reflective listening and normalization.  No discharge plans identified at this time. SW remains available.     "

## 2017-10-26 NOTE — ASSESSMENT & PLAN NOTE
S/p Liver transplant and HD during surgery    - likely ischemic ATN from decreased renal perfusion (volume shifts from paracentesis, anemia)  - urine sediment with muddy brown casts  - Last dialysis treatment was on Saturday and Sunday SLED was hold   - Serum creatinine worsened compared with yesterday from 2.5-->2.3  - Patient continues to be anuric   - Will have dialysis today for removal of excess fluid and toxins, duration of 3 hrs and UF 2-3 L as tolerated by patient, maintain MAP >65

## 2017-10-26 NOTE — ASSESSMENT & PLAN NOTE
- PRBC x 2 u today with HD  - No s/s active bleed, likely slow ooze with anemia of chronic disease

## 2017-10-26 NOTE — PROGRESS NOTES
HD completed x 3 hrs with 2.7 L removed. Pt tolerated. Blood rinsed back with NS and CVC saline locked. Ports clamped and capped. Post VSS. Report given to primary RN Renee.

## 2017-10-26 NOTE — PROGRESS NOTES
[facility]  Nephrology  Progress Note    Patient Name: Jhonny Diana  MRN: [id number]  Admission Date: 9/27/2017  Hospital Length of Stay: 29 days  Attending Provider: Solis Chapa MD   Primary Care Physician: Provider Notinsystem  Principal Problem:Liver replaced by transplant    Subjective:     HPI: Jhonny Diana is a 26 yo male with pMHX of childhood asthma and alcohol abuse who was was transferred to [facility] for hepatology evaluation for worsening liver functions.  Information was obtained from family at bedside and review of outside records, given that patient is obtunded.  According to mother at bedside, patient has had multiple admissions to the hospital over the past 6 months for recurrent abdominal pain.  She reports that he has been been seen the ED and has been diagnosed with diverticulitis, UTIs, gastroenteritis, and cholecystitis.  He was never admitted to the hospital and was always discharged.  On 09/06, patient presented to OSH for worsening abdominal pain and distension.  He was diagnosed at that time with ETOH hepatitis.  He was treated with steroids, ceftriaxone for SBP prophylaxis, and diuresed at that time for volume management.  He was discharged home on the 15th, after clearance from GI,a nd was instructed to follow-up with his PCP 1 week after discharge.  He presented to the hospital on 09/25 for worsening abdominal pain and distension with 20 kg weight gain.  He was transferred to [facility] for further care and Nephrology was consulted for LAURA.    On review of outside records, patient Scr on admission was 1.5 (with baseline at 1.0).  According to the family at the bedside, he was not urinating well at the OSH, and not responding to diuretics (spirionolactone/lasix combo).  His mother reports that patient continued to c/o abdominal and inability to urinate, but no chicas was placed until transfer to [facility] yesterday.  Record reviews shows relative hypotension with BPs in the low 90's.  His  mother reports that the nursing staff was administrating his narcotic regimen based on his blood pressures, and at times, medication was held due to hypotension.  Since admission to hospital, his UOP has improved, responding well in Lasix 40 IV (770 ml since admission, 700 ml so far this shift at time of consultation).  Admit labs revealed a SCr of 2.6, this morning improved down to 2.4.  Bilirubin remains elevated, >14.  Abdomen remains with tight distension, unable to perform paracentesis, as no significant fluid pocket found.  Mother reports patient did not have para's done at OSH.    Labs on the morning of 09/27 with bicarb of 15.  ABG with alkalemia and primary respiratory alkalosis and anion gap acidosis.     Interval History:   Patient evaluated at bedside, hemodynamically stable, no respiratory distress, patient still anuric     Review of patient's allergies indicates:   Allergen Reactions    Bactrim [sulfamethoxazole-trimethoprim] Other (See Comments)     Mookie Trell Syndrome     Current Facility-Administered Medications   Medication Frequency    0.9%  NaCl infusion (for blood administration) Q24H PRN    albuterol-ipratropium 2.5mg-0.5mg/3mL nebulizer solution 3 mL Q6H WAKE    atovaquone suspension 1,500 mg Daily    dextrose 50% injection 12.5 g PRN    dextrose 50% injection 25 g PRN    ergocalciferol capsule 50,000 Units Q7 Days    famotidine tablet 20 mg QHS    fluconazole tablet 200 mg Daily    glucagon (human recombinant) injection 1 mg PRN    glucose chewable tablet 16 g PRN    glucose chewable tablet 24 g PRN    heparin (porcine) injection 5,000 Units Q8H    insulin aspart pen 0-5 Units QID (AC + HS) PRN    mycophenolate capsule 1,000 mg BID    oxycodone immediate release tablet 10 mg Q6H PRN    oxycodone immediate release tablet 5 mg Q6H PRN    predniSONE tablet 20 mg Daily    tacrolimus capsule 2 mg BID    valganciclovir 50 mg/ml oral solution 100 mg Every Mon, Wed, Fri        Objective:     Vital Signs (Most Recent):  Temp: 97.8 °F (36.6 °C) (10/26/17 1300)  Pulse: (!) 113 (10/26/17 1438)  Resp: 20 (10/26/17 1300)  BP: (!) 107/55 (10/26/17 1300)  SpO2: 97 % (10/26/17 1218)  O2 Device (Oxygen Therapy): room air (10/26/17 1300) Vital Signs (24h Range):  Temp:  [97.1 °F (36.2 °C)-98.9 °F (37.2 °C)] 97.8 °F (36.6 °C)  Pulse:  [] 113  Resp:  [16-20] 20  SpO2:  [18 %-100 %] 97 %  BP: ()/(44-75) 107/55     Weight: 80.3 kg (177 lb 0.5 oz) (10/26/17 0530)  Body mass index is 27.73 kg/m².  Body surface area is 1.95 meters squared.    I/O last 3 completed shifts:  In: 1680 [P.O.:1680]  Out: 0     Physical Exam   Constitutional: He is oriented to person, place, and time. He appears well-developed and well-nourished. He is easily aroused. No distress. He is sedated and not intubated.   HENT:   Head: Normocephalic and atraumatic.   Eyes: Conjunctivae and EOM are normal. Pupils are equal, round, and reactive to light. Scleral icterus is present.   Neck: Trachea normal and normal range of motion. Neck supple. No JVD present.   Cardiovascular: Normal rate, regular rhythm, S1 normal, S2 normal, normal heart sounds, intact distal pulses and normal pulses.  Exam reveals no gallop and no friction rub.    No murmur heard.  Upper and lower ptting edema   Pulmonary/Chest: Effort normal and breath sounds normal. He is not intubated.   Abdominal: Soft. Bowel sounds are normal. He exhibits distension.   Musculoskeletal: Normal range of motion. He exhibits edema. He exhibits no deformity.   Neurological: He is alert, oriented to person, place, and time and easily aroused.   Skin: Skin is warm and dry. Capillary refill takes less than 2 seconds. He is not diaphoretic.   Psychiatric: He has a normal mood and affect. His behavior is normal.   Vitals reviewed.      Significant Labs:  ABGs:   Recent Labs  Lab 10/22/17  1523   PH 7.463*   PCO2 41.3   HCO3 29.6*   POCSATURATED 99   BE 6     BMP:   Recent  Labs  Lab 10/22/17  0809  10/26/17  0500   GLU  --   < > 114*   CL  --   < > 101   CO2  --   < > 21*   BUN  --   < > 51*   CREATININE  --   < > 3.5*   CALCIUM  --   < > 7.5*   MG 2.5  2.5  --   --    < > = values in this interval not displayed.  CBC:   Recent Labs  Lab 10/26/17  0500 10/26/17  0834   WBC 8.79  --    RBC 2.33*  --    HGB 7.1* 7.5*   HCT 20.7* 21.4*   PLT 70*  --    MCV 89  --    MCH 30.5  --    MCHC 34.3  --      CMP:   Recent Labs  Lab 10/26/17  0500   *   CALCIUM 7.5*   ALBUMIN 2.0*   PROT 3.9*   *   K 4.2   CO2 21*      BUN 51*   CREATININE 3.5*   ALKPHOS 197*   ALT 25   AST 57*  57*   BILITOT 4.5*     All labs within the past 24 hours have been reviewed.       Assessment/Plan:     LAURA (acute kidney injury)    S/p Liver transplant and HD during surgery    - likely ischemic ATN from decreased renal perfusion (volume shifts from paracentesis, anemia)  - urine sediment with muddy brown casts  - Last dialysis treatment was on Saturday and Sunday SLED was hold   - Serum creatinine worsened compared with yesterday from 2.5-->2.3  - Patient continues to be anuric   - Will have dialysis today for removal of excess fluid and toxins, duration of 3 hrs and UF 2-3 L as tolerated by patient, maintain MAP >65            Win Sandoval  Nephrology  Fellow  [facility]    Pager [phone]

## 2017-10-26 NOTE — SUBJECTIVE & OBJECTIVE
Scheduled Meds:   albuterol-ipratropium 2.5mg-0.5mg/3mL  3 mL Nebulization Q6H WAKE    atovaquone  1,500 mg Oral Daily    ergocalciferol  50,000 Units Oral Q7 Days    famotidine  20 mg Oral QHS    fluconazole  200 mg Oral Daily    heparin (porcine)  5,000 Units Subcutaneous Q8H    mycophenolate  1,000 mg Oral BID    predniSONE  20 mg Oral Daily    tacrolimus  2 mg Oral BID    valganciclovir 50 mg/ml  100 mg Oral Every Mon, Wed, Fri     Continuous Infusions:   PRN Meds:sodium chloride, dextrose 50%, dextrose 50%, glucagon (human recombinant), glucose, glucose, insulin aspart, oxyCODONE, oxyCODONE    Review of Systems   Constitutional: Positive for activity change, appetite change and fatigue. Negative for diaphoresis and fever.   HENT: Negative for mouth sores.    Respiratory: Negative for cough, choking, shortness of breath and wheezing.    Cardiovascular: Positive for leg swelling. Negative for chest pain and palpitations.   Gastrointestinal: Positive for abdominal distention. Negative for abdominal pain, constipation, diarrhea, nausea and vomiting.   Genitourinary: Negative for decreased urine volume and difficulty urinating.   Musculoskeletal: Negative for arthralgias, back pain and joint swelling.   Skin: Positive for wound.   Allergic/Immunologic: Positive for immunocompromised state.   Neurological: Negative for tremors and weakness.   Hematological: Bruises/bleeds easily.   Psychiatric/Behavioral: Negative for confusion and sleep disturbance.   All other systems reviewed and are negative.    Objective:     Vital Signs (Most Recent):  Temp: 97.8 °F (36.6 °C) (10/26/17 1300)  Pulse: (!) 113 (10/26/17 1438)  Resp: 20 (10/26/17 1300)  BP: (!) 107/55 (10/26/17 1300)  SpO2: 97 % (10/26/17 1218) Vital Signs (24h Range):  Temp:  [97.1 °F (36.2 °C)-98.9 °F (37.2 °C)] 97.8 °F (36.6 °C)  Pulse:  [] 113  Resp:  [16-20] 20  SpO2:  [18 %-100 %] 97 %  BP: ()/(44-75) 107/55     Weight: 80.3 kg (177 lb  0.5 oz)  Body mass index is 27.73 kg/m².    Intake/Output - Last 3 Shifts       10/24 0700 - 10/25 0659 10/25 0700 - 10/26 0659 10/26 0700 - 10/27 0659    P.O. 780 1080     I.V. (mL/kg)       Blood 288  270    Other 500  600    Total Intake(mL/kg) 1568 (18.2) 1080 (13.4) 870 (10.8)    Urine (mL/kg/hr)  0 (0)     Drains       Other 3500 (1.7)  3000 (4.8)    Stool  0 (0)     Total Output 3500 0 3000    Net -1932 +1080 -2130           Urine Occurrence  0 x     Stool Occurrence 2 x 0 x           Physical Exam   Constitutional: He is oriented to person, place, and time. He appears well-developed. No distress.   Temporal and distal extremity muscle wasting   HENT:   Head: Normocephalic and atraumatic.   Mouth/Throat: No oropharyngeal exudate.   Eyes: EOM are normal. Pupils are equal, round, and reactive to light. Scleral icterus is present.   Neck: Normal range of motion. Neck supple.   Cardiovascular: Normal rate, regular rhythm and normal heart sounds.  Exam reveals no gallop and no friction rub.    No murmur heard.  Pulmonary/Chest: Effort normal. No respiratory distress. He has no rales.   Dec R base   Abdominal: Soft. Bowel sounds are normal. He exhibits distension. There is no tenderness.   Chevron inc cdi no ssi   Musculoskeletal: Normal range of motion. He exhibits edema (1-2+ generalized).   Neurological: He is alert and oriented to person, place, and time.   Skin: Skin is warm and dry. He is not diaphoretic.   jaundiced   Psychiatric: He has a normal mood and affect. His behavior is normal. Judgment and thought content normal.   Nursing note and vitals reviewed.      Laboratory:  Immunosuppressants         Stop Route Frequency     tacrolimus capsule 2 mg      -- Oral 2 times daily     mycophenolate capsule 1,000 mg      -- Oral 2 times daily        CBC:   Recent Labs  Lab 10/26/17  0500 10/26/17  0834   WBC 8.79  --    RBC 2.33*  --    HGB 7.1* 7.5*   HCT 20.7* 21.4*   PLT 70*  --    MCV 89  --    MCH 30.5  --     MCHC 34.3  --      CMP:   Recent Labs  Lab 10/26/17  0500   *   CALCIUM 7.5*   ALBUMIN 2.0*   PROT 3.9*   *   K 4.2   CO2 21*      BUN 51*   CREATININE 3.5*   ALKPHOS 197*   ALT 25   AST 57*  57*   BILITOT 4.5*     Labs within the past 24 hours have been reviewed.    Diagnostic Results:  I have personally reviewed all pertinent imaging studies.

## 2017-10-26 NOTE — ASSESSMENT & PLAN NOTE
- Continue tacrolimus, prednisone taper, Cellcept.  - Will monitor for signs of toxic side effects, check daily tacrolimus troughs, and change meds accordingly.

## 2017-10-26 NOTE — ASSESSMENT & PLAN NOTE
- LFTs/enzymes trended down nicely  - Met all post surgical marks  - Liver US POD 5 with elevated RIs and slowed flow HA system- will monitor labs and repeat prior to discharge.

## 2017-10-26 NOTE — SIGNIFICANT EVENT
Critical Care Outreach (CORE)  Critical Care Medicine  Consult Note      CORE Metrics:     Admit Date: 2017  LOS: 29  Code Status: Full Code   CC: AI, epic alert  Date of Consult: 10/26/2017  : 1990  Age: 27 y.o.  Weight:   Wt Readings from Last 1 Encounters:   10/26/17 80.3 kg (177 lb 0.5 oz)     Race:   Sex: male  Bed: 8079/8079 A:   MRN: [id number]  RRT Indication(s): JH epic alert  Was the patient discharged from an ICU this admission? yes  Was the patient discharged from a PACU within last 24 hours? no  Did the patient receive conscious sedation/general anesthesia within last 24 hours? no  Was the patient in the ED within the past 24 hours? no  Was the patient started on NIPPV within the past 24 hours? no  Did this progress into an ARC or CPA: no  Attending Physician: Solis Chapa MD  Primary Service: [facility] LIVER TRANSPLANT  Illness Category: transplant  (Medical Cardiac, Neurologic, Surgical Cardiac, Medical Non-Cardiac, Surgical Non-Cardiac, [place], Obstetric, Ambulatory/Outpatient, Trauma, Other i.e. Visitor/Employee)  Consult Requested By: Solis Chapa MD   Disposition: Remain in 8079    Mr. Jhonny Diana is a 28 y/o male with acute alcoholic hepatitis s/p Liver Transplant on 10/26/17.  He was stepdown on 10/25 to TSU.  Recent VS: HR 95 /58 on room air.   On evaluation, Mr. Diana was alert, conversant, resting comfortably in bed with ongoing dialysis.  He is not in any acute distress.  No complaints of dizziness, headache, shortness of breath, chest pain, or abdominal pain.      Spoke with bedside RN and RIMA Melton with liver transplant.  No indication to transfer to a higher level of care at this time.     Please call for any questions or concerns.     CHARLINE SHERMAN, ACNP-BC  Critical Care Medicine  [phone]

## 2017-10-26 NOTE — NURSING TRANSFER
Nursing Transfer Note      10/25/2017     Transfer To: U 8079    Transfer via bed    Transfer with cardiac monitoring    Transported by RN, PCT    Medicines sent: Yes    Chart send with patient: Yes    Notified: Mother    Patient reassessed at: 10/25/17, 1900    Upon arrival to floor: cardiac monitor applied, patient oriented to room, call bell in reach and bed in lowest position

## 2017-10-26 NOTE — PLAN OF CARE
Problem: Patient Care Overview  Goal: Plan of Care Review  Outcome: Ongoing (interventions implemented as appropriate)  Patient's VSS for shift. Patient remains AAOx4, calm and cooperative. He is a two person assist from bed to chair but he and his mom state he does appear to be getting stronger. Patient had hemodialysis at bedside today. 2.7 L removed. Patient tolerated the procedure well. Was asked about pain every hour, but patient stated that he was okay and did not need medicine. Finally around 1300, patient began crying because he said he hurt all over. Mother and I encouraged patient to be more verbal when in pain so that it can be managed better. Patient and mother taught self-meds this afternoon and taught how to paint incision with betadine. Both voiced understanding. Patient is using incentive spirometer independently every 4 hours. Continues to make statements regarding how grateful he is for his transplant and for the staff around him. Patient and mother did go downstairs for about an hour and patient got a hot chocolate from [employer]. He stated that the trip went very well.

## 2017-10-26 NOTE — SUBJECTIVE & OBJECTIVE
Interval History:   Patient evaluated at bedside, hemodynamically stable, no respiratory distress, patient still anuric     Review of patient's allergies indicates:   Allergen Reactions    Bactrim [sulfamethoxazole-trimethoprim] Other (See Comments)     Mookie Trell Syndrome     Current Facility-Administered Medications   Medication Frequency    0.9%  NaCl infusion (for blood administration) Q24H PRN    albuterol-ipratropium 2.5mg-0.5mg/3mL nebulizer solution 3 mL Q6H WAKE    atovaquone suspension 1,500 mg Daily    dextrose 50% injection 12.5 g PRN    dextrose 50% injection 25 g PRN    ergocalciferol capsule 50,000 Units Q7 Days    famotidine tablet 20 mg QHS    fluconazole tablet 200 mg Daily    glucagon (human recombinant) injection 1 mg PRN    glucose chewable tablet 16 g PRN    glucose chewable tablet 24 g PRN    heparin (porcine) injection 5,000 Units Q8H    insulin aspart pen 0-5 Units QID (AC + HS) PRN    mycophenolate capsule 1,000 mg BID    oxycodone immediate release tablet 10 mg Q6H PRN    oxycodone immediate release tablet 5 mg Q6H PRN    predniSONE tablet 20 mg Daily    tacrolimus capsule 2 mg BID    valganciclovir 50 mg/ml oral solution 100 mg Every Mon, Wed, Fri       Objective:     Vital Signs (Most Recent):  Temp: 97.8 °F (36.6 °C) (10/26/17 1300)  Pulse: (!) 113 (10/26/17 1438)  Resp: 20 (10/26/17 1300)  BP: (!) 107/55 (10/26/17 1300)  SpO2: 97 % (10/26/17 1218)  O2 Device (Oxygen Therapy): room air (10/26/17 1300) Vital Signs (24h Range):  Temp:  [97.1 °F (36.2 °C)-98.9 °F (37.2 °C)] 97.8 °F (36.6 °C)  Pulse:  [] 113  Resp:  [16-20] 20  SpO2:  [18 %-100 %] 97 %  BP: ()/(44-75) 107/55     Weight: 80.3 kg (177 lb 0.5 oz) (10/26/17 0530)  Body mass index is 27.73 kg/m².  Body surface area is 1.95 meters squared.    I/O last 3 completed shifts:  In: 1680 [P.O.:1680]  Out: 0     Physical Exam   Constitutional: He is oriented to person, place, and time. He appears  well-developed and well-nourished. He is easily aroused. No distress. He is sedated and not intubated.   HENT:   Head: Normocephalic and atraumatic.   Eyes: Conjunctivae and EOM are normal. Pupils are equal, round, and reactive to light. Scleral icterus is present.   Neck: Trachea normal and normal range of motion. Neck supple. No JVD present.   Cardiovascular: Normal rate, regular rhythm, S1 normal, S2 normal, normal heart sounds, intact distal pulses and normal pulses.  Exam reveals no gallop and no friction rub.    No murmur heard.  Upper and lower ptting edema   Pulmonary/Chest: Effort normal and breath sounds normal. He is not intubated.   Abdominal: Soft. Bowel sounds are normal. He exhibits distension.   Musculoskeletal: Normal range of motion. He exhibits edema. He exhibits no deformity.   Neurological: He is alert, oriented to person, place, and time and easily aroused.   Skin: Skin is warm and dry. Capillary refill takes less than 2 seconds. He is not diaphoretic.   Psychiatric: He has a normal mood and affect. His behavior is normal.   Vitals reviewed.      Significant Labs:  ABGs:   Recent Labs  Lab 10/22/17  1523   PH 7.463*   PCO2 41.3   HCO3 29.6*   POCSATURATED 99   BE 6     BMP:   Recent Labs  Lab 10/22/17  0809  10/26/17  0500   GLU  --   < > 114*   CL  --   < > 101   CO2  --   < > 21*   BUN  --   < > 51*   CREATININE  --   < > 3.5*   CALCIUM  --   < > 7.5*   MG 2.5  2.5  --   --    < > = values in this interval not displayed.  CBC:   Recent Labs  Lab 10/26/17  0500 10/26/17  0834   WBC 8.79  --    RBC 2.33*  --    HGB 7.1* 7.5*   HCT 20.7* 21.4*   PLT 70*  --    MCV 89  --    MCH 30.5  --    MCHC 34.3  --      CMP:   Recent Labs  Lab 10/26/17  0500   *   CALCIUM 7.5*   ALBUMIN 2.0*   PROT 3.9*   *   K 4.2   CO2 21*      BUN 51*   CREATININE 3.5*   ALKPHOS 197*   ALT 25   AST 57*  57*   BILITOT 4.5*     All labs within the past 24 hours have been reviewed.

## 2017-10-26 NOTE — PLAN OF CARE
Problem: Patient Care Overview  Goal: Plan of Care Review  Outcome: Ongoing (interventions implemented as appropriate)  -AAOx4  -RIJ trialysis, cdi  -left arm midline, sl, cdi  -tele SR  -Chevron incision, elias with staples, cdi edges well approximated.  -Bladder scan q6hrs. 0000 18mL in bladder. Will continue to monitor.   -HD today  -Educated on self meds.    Problem: Fall Risk (Adult)  Goal: Identify Related Risk Factors and Signs and Symptoms  Related risk factors and signs and symptoms are identified upon initiation of Human Response Clinical Practice Guideline (CPG)   Outcome: Ongoing (interventions implemented as appropriate)  -2 person assist.   -Commode @ bedside  -Pt free from falls/ injuries thus far during shift.  -Bed in low, locked position. Bed rails up x 2. Call light within reach.     Problem: Pressure Ulcer Risk (Syed Scale) (Adult,Obstetrics,Pediatric)  Goal: Identify Related Risk Factors and Signs and Symptoms  Related risk factors and signs and symptoms are identified upon initiation of Human Response Clinical Practice Guideline (CPG)   Outcome: Ongoing (interventions implemented as appropriate)  -free from breakdown    Problem: Infection, Risk/Actual (Adult)  Goal: Identify Related Risk Factors and Signs and Symptoms  Related risk factors and signs and symptoms are identified upon initiation of Human Response Clinical Practice Guideline (CPG)   Outcome: Ongoing (interventions implemented as appropriate)  -afebrile, tmax 98.9

## 2017-10-27 LAB
ALBUMIN SERPL BCP-MCNC: 2 G/DL
ALP SERPL-CCNC: 213 U/L
ALT SERPL W/O P-5'-P-CCNC: 18 U/L
ANION GAP SERPL CALC-SCNC: 7 MMOL/L
AST SERPL-CCNC: 48 U/L
BACTERIA BLD CULT: NORMAL
BACTERIA BLD CULT: NORMAL
BASOPHILS # BLD AUTO: 0 K/UL
BASOPHILS NFR BLD: 0 %
BILIRUB SERPL-MCNC: 4 MG/DL
BUN SERPL-MCNC: 34 MG/DL
CALCIUM SERPL-MCNC: 7.3 MG/DL
CHLORIDE SERPL-SCNC: 100 MMOL/L
CO2 SERPL-SCNC: 26 MMOL/L
CREAT SERPL-MCNC: 2.9 MG/DL
DIFFERENTIAL METHOD: ABNORMAL
EOSINOPHIL # BLD AUTO: 0.1 K/UL
EOSINOPHIL NFR BLD: 0.9 %
ERYTHROCYTE [DISTWIDTH] IN BLOOD BY AUTOMATED COUNT: 18.8 %
EST. GFR  (AFRICAN AMERICAN): 32.5 ML/MIN/1.73 M^2
EST. GFR  (NON AFRICAN AMERICAN): 28.1 ML/MIN/1.73 M^2
GLUCOSE SERPL-MCNC: 95 MG/DL
HCT VFR BLD AUTO: 25 %
HGB BLD-MCNC: 8.6 G/DL
IMM GRANULOCYTES # BLD AUTO: 0.22 K/UL
IMM GRANULOCYTES NFR BLD AUTO: 2 %
LYMPHOCYTES # BLD AUTO: 1 K/UL
LYMPHOCYTES NFR BLD: 9.3 %
MCH RBC QN AUTO: 30.3 PG
MCHC RBC AUTO-ENTMCNC: 34.4 G/DL
MCV RBC AUTO: 88 FL
MONOCYTES # BLD AUTO: 1 K/UL
MONOCYTES NFR BLD: 8.9 %
NEUTROPHILS # BLD AUTO: 8.5 K/UL
NEUTROPHILS NFR BLD: 78.9 %
NRBC BLD-RTO: 0 /100 WBC
PLATELET # BLD AUTO: 64 K/UL
PMV BLD AUTO: 11.1 FL
POCT GLUCOSE: 116 MG/DL (ref 70–110)
POTASSIUM SERPL-SCNC: 3.9 MMOL/L
PROT SERPL-MCNC: 4.1 G/DL
RBC # BLD AUTO: 2.84 M/UL
SODIUM SERPL-SCNC: 133 MMOL/L
TACROLIMUS BLD-MCNC: 5.2 NG/ML
WBC # BLD AUTO: 10.81 K/UL

## 2017-10-27 PROCEDURE — 63600175 PHARM REV CODE 636 W HCPCS: Performed by: PHYSICIAN ASSISTANT

## 2017-10-27 PROCEDURE — 85025 COMPLETE CBC W/AUTO DIFF WBC: CPT

## 2017-10-27 PROCEDURE — 99233 SBSQ HOSP IP/OBS HIGH 50: CPT | Mod: 24,,, | Performed by: PHYSICIAN ASSISTANT

## 2017-10-27 PROCEDURE — 63600175 PHARM REV CODE 636 W HCPCS: Performed by: TRANSPLANT SURGERY

## 2017-10-27 PROCEDURE — 20600001 HC STEP DOWN PRIVATE ROOM

## 2017-10-27 PROCEDURE — 80197 ASSAY OF TACROLIMUS: CPT

## 2017-10-27 PROCEDURE — 25000242 PHARM REV CODE 250 ALT 637 W/ HCPCS: Performed by: STUDENT IN AN ORGANIZED HEALTH CARE EDUCATION/TRAINING PROGRAM

## 2017-10-27 PROCEDURE — 25000003 PHARM REV CODE 250: Performed by: STUDENT IN AN ORGANIZED HEALTH CARE EDUCATION/TRAINING PROGRAM

## 2017-10-27 PROCEDURE — 94640 AIRWAY INHALATION TREATMENT: CPT

## 2017-10-27 PROCEDURE — 63600175 PHARM REV CODE 636 W HCPCS: Performed by: STUDENT IN AN ORGANIZED HEALTH CARE EDUCATION/TRAINING PROGRAM

## 2017-10-27 PROCEDURE — 25000003 PHARM REV CODE 250: Performed by: TRANSPLANT SURGERY

## 2017-10-27 PROCEDURE — 97166 OT EVAL MOD COMPLEX 45 MIN: CPT

## 2017-10-27 PROCEDURE — 99232 SBSQ HOSP IP/OBS MODERATE 35: CPT | Mod: ,,, | Performed by: INTERNAL MEDICINE

## 2017-10-27 PROCEDURE — 25000003 PHARM REV CODE 250: Performed by: PHYSICIAN ASSISTANT

## 2017-10-27 PROCEDURE — 94761 N-INVAS EAR/PLS OXIMETRY MLT: CPT

## 2017-10-27 PROCEDURE — 80053 COMPREHEN METABOLIC PANEL: CPT

## 2017-10-27 RX ORDER — BISACODYL 10 MG
10 SUPPOSITORY, RECTAL RECTAL ONCE
Status: COMPLETED | OUTPATIENT
Start: 2017-10-27 | End: 2017-10-27

## 2017-10-27 RX ORDER — LORAZEPAM 2 MG/ML
0.5 INJECTION INTRAMUSCULAR ONCE
Status: COMPLETED | OUTPATIENT
Start: 2017-10-27 | End: 2017-10-27

## 2017-10-27 RX ORDER — OXYCODONE HYDROCHLORIDE 5 MG/1
5 TABLET ORAL EVERY 6 HOURS PRN
Status: DISCONTINUED | OUTPATIENT
Start: 2017-10-27 | End: 2017-10-28

## 2017-10-27 RX ORDER — TACROLIMUS 1 MG/1
3 CAPSULE ORAL 2 TIMES DAILY
Status: DISCONTINUED | OUTPATIENT
Start: 2017-10-27 | End: 2017-10-30

## 2017-10-27 RX ORDER — FUROSEMIDE 10 MG/ML
100 INJECTION INTRAMUSCULAR; INTRAVENOUS ONCE
Status: COMPLETED | OUTPATIENT
Start: 2017-10-27 | End: 2017-10-27

## 2017-10-27 RX ORDER — DOCUSATE SODIUM 100 MG/1
100 CAPSULE, LIQUID FILLED ORAL
Status: DISCONTINUED | OUTPATIENT
Start: 2017-10-27 | End: 2017-11-08 | Stop reason: HOSPADM

## 2017-10-27 RX ORDER — TRAMADOL HYDROCHLORIDE 50 MG/1
50 TABLET ORAL EVERY 4 HOURS PRN
Status: DISCONTINUED | OUTPATIENT
Start: 2017-10-27 | End: 2017-11-03

## 2017-10-27 RX ORDER — LORAZEPAM 2 MG/ML
INJECTION INTRAMUSCULAR
Status: DISPENSED
Start: 2017-10-27 | End: 2017-10-28

## 2017-10-27 RX ORDER — ACETAMINOPHEN 500 MG
500 TABLET ORAL EVERY 6 HOURS
Status: DISCONTINUED | OUTPATIENT
Start: 2017-10-27 | End: 2017-11-01

## 2017-10-27 RX ORDER — SIMETHICONE 80 MG
1 TABLET,CHEWABLE ORAL 3 TIMES DAILY PRN
Status: DISCONTINUED | OUTPATIENT
Start: 2017-10-27 | End: 2017-11-08 | Stop reason: HOSPADM

## 2017-10-27 RX ORDER — LIDOCAINE HYDROCHLORIDE 10 MG/ML
10 INJECTION INFILTRATION; PERINEURAL ONCE
Status: COMPLETED | OUTPATIENT
Start: 2017-10-27 | End: 2017-10-27

## 2017-10-27 RX ORDER — BISACODYL 5 MG
10 TABLET, DELAYED RELEASE (ENTERIC COATED) ORAL DAILY
Status: DISCONTINUED | OUTPATIENT
Start: 2017-10-27 | End: 2017-11-08 | Stop reason: HOSPADM

## 2017-10-27 RX ADMIN — BISACODYL 10 MG: 5 TABLET, COATED ORAL at 08:10

## 2017-10-27 RX ADMIN — FUROSEMIDE 100 MG: 10 INJECTION, SOLUTION INTRAVENOUS at 10:10

## 2017-10-27 RX ADMIN — HEPARIN SODIUM 5000 UNITS: 5000 INJECTION, SOLUTION INTRAVENOUS; SUBCUTANEOUS at 09:10

## 2017-10-27 RX ADMIN — IPRATROPIUM BROMIDE AND ALBUTEROL SULFATE 3 ML: .5; 3 SOLUTION RESPIRATORY (INHALATION) at 02:10

## 2017-10-27 RX ADMIN — MORPHINE 100 MG: 10 SOLUTION ORAL at 10:10

## 2017-10-27 RX ADMIN — OXYCODONE HYDROCHLORIDE 10 MG: 5 TABLET ORAL at 12:10

## 2017-10-27 RX ADMIN — IPRATROPIUM BROMIDE AND ALBUTEROL SULFATE 3 ML: .5; 3 SOLUTION RESPIRATORY (INHALATION) at 09:10

## 2017-10-27 RX ADMIN — HEPARIN SODIUM 5000 UNITS: 5000 INJECTION, SOLUTION INTRAVENOUS; SUBCUTANEOUS at 04:10

## 2017-10-27 RX ADMIN — TACROLIMUS 3 MG: 1 CAPSULE ORAL at 05:10

## 2017-10-27 RX ADMIN — TRAMADOL HYDROCHLORIDE 50 MG: 50 TABLET, COATED ORAL at 10:10

## 2017-10-27 RX ADMIN — SIMETHICONE CHEW TAB 80 MG 80 MG: 80 TABLET ORAL at 09:10

## 2017-10-27 RX ADMIN — LIDOCAINE HYDROCHLORIDE 10 ML: 10 INJECTION, SOLUTION INFILTRATION; PERINEURAL at 05:10

## 2017-10-27 RX ADMIN — ACETAMINOPHEN 500 MG: 500 TABLET ORAL at 05:10

## 2017-10-27 RX ADMIN — FLUCONAZOLE 200 MG: 200 TABLET ORAL at 08:10

## 2017-10-27 RX ADMIN — MYCOPHENOLATE MOFETIL 1000 MG: 250 CAPSULE ORAL at 09:10

## 2017-10-27 RX ADMIN — ATOVAQUONE 1500 MG: 750 SUSPENSION ORAL at 08:10

## 2017-10-27 RX ADMIN — ACETAMINOPHEN 500 MG: 500 TABLET ORAL at 10:10

## 2017-10-27 RX ADMIN — BISACODYL 10 MG: 10 SUPPOSITORY RECTAL at 10:10

## 2017-10-27 RX ADMIN — DOCUSATE SODIUM 100 MG: 100 CAPSULE, LIQUID FILLED ORAL at 09:10

## 2017-10-27 RX ADMIN — TACROLIMUS 2 MG: 1 CAPSULE ORAL at 08:10

## 2017-10-27 RX ADMIN — SIMETHICONE CHEW TAB 80 MG 80 MG: 80 TABLET ORAL at 08:10

## 2017-10-27 RX ADMIN — PREDNISONE 20 MG: 10 TABLET ORAL at 08:10

## 2017-10-27 RX ADMIN — LORAZEPAM 0.5 MG: 2 INJECTION INTRAMUSCULAR; INTRAVENOUS at 05:10

## 2017-10-27 RX ADMIN — HEPARIN SODIUM 5000 UNITS: 5000 INJECTION, SOLUTION INTRAVENOUS; SUBCUTANEOUS at 03:10

## 2017-10-27 RX ADMIN — DOCUSATE SODIUM 100 MG: 100 CAPSULE, LIQUID FILLED ORAL at 08:10

## 2017-10-27 RX ADMIN — FAMOTIDINE 20 MG: 20 TABLET, FILM COATED ORAL at 09:10

## 2017-10-27 RX ADMIN — DOCUSATE SODIUM 100 MG: 100 CAPSULE, LIQUID FILLED ORAL at 03:10

## 2017-10-27 NOTE — PROGRESS NOTES
[facility]  Nephrology  Progress Note    Patient Name: Jhonny Diana  MRN: [id number]  Admission Date: 9/27/2017  Hospital Length of Stay: 30 days  Attending Provider: Solis Chapa MD   Primary Care Physician: Provider Notinsystem  Principal Problem:Liver replaced by transplant    Subjective:     HPI: Jhonny Diana is a 26 yo male with pMHX of childhood asthma and alcohol abuse who was was transferred to [facility] [place] [facility] for hepatology evaluation for worsening liver functions.  Information was obtained from family at bedside and review of outside records, given that patient is obtunded.  According to mother at bedside, patient has had multiple admissions to the hospital over the past 6 months for recurrent abdominal pain.  She reports that he has been been seen the ED and has been diagnosed with diverticulitis, UTIs, gastroenteritis, and cholecystitis.  He was never admitted to the hospital and was always discharged.  On 09/06, patient presented to OSH for worsening abdominal pain and distension.  He was diagnosed at that time with ETOH hepatitis.  He was treated with steroids, ceftriaxone for SBP prophylaxis, and diuresed at that time for volume management.  He was discharged home on the 15th, after clearance from GI,a nd was instructed to follow-up with his PCP 1 week after discharge.  He presented to the hospital on 09/25 for worsening abdominal pain and distension with 20 kg weight gain.  He was transferred to [facility] for further care and Nephrology was consulted for LAURA.    On review of outside records, patient Scr on admission was 1.5 (with baseline at 1.0).  According to the family at the bedside, he was not urinating well at the OSH, and not responding to diuretics (spirionolactone/lasix combo).  His mother reports that patient continued to c/o abdominal and inability to urinate, but no chicas was placed until transfer to [facility] yesterday.  Record reviews shows relative hypotension with BPs in the low 90's.  His  "mother reports that the nursing staff was administrating his narcotic regimen based on his blood pressures, and at times, medication was held due to hypotension.  Since admission to hospital, his UOP has improved, responding well in Lasix 40 IV (770 ml since admission, 700 ml so far this shift at time of consultation).  Admit labs revealed a SCr of 2.6, this morning improved down to 2.4.  Bilirubin remains elevated, >14.  Abdomen remains with tight distension, unable to perform paracentesis, as no significant fluid pocket found.  Mother reports patient did not have para's done at OSH.    Labs on the morning of 09/27 with bicarb of 15.  ABG with alkalemia and primary respiratory alkalosis and anion gap acidosis.     Interval History:   Received HD yesterday x 3 hours; 2.7L removed. No events overnight. UOP 50cc yesterday. Patient also reports some urine this morning. SOB improved. Still with tense edema that he describes as "bothersome". Net negative 1L yesterday.  Received lasix 100mg IV x1 around 11am this morning.     Review of patient's allergies indicates:   Allergen Reactions    Bactrim [sulfamethoxazole-trimethoprim] Other (See Comments)     Mookie Trell Syndrome     Current Facility-Administered Medications   Medication Frequency    0.9%  NaCl infusion (for blood administration) Q24H PRN    acetaminophen tablet 500 mg Q6H    albuterol-ipratropium 2.5mg-0.5mg/3mL nebulizer solution 3 mL Q6H WAKE    atovaquone suspension 1,500 mg Daily    bisacodyl EC tablet 10 mg Daily    dextrose 50% injection 12.5 g PRN    dextrose 50% injection 25 g PRN    docusate sodium capsule 100 mg TID PC    ergocalciferol capsule 50,000 Units Q7 Days    famotidine tablet 20 mg QHS    fluconazole tablet 200 mg Daily    glucagon (human recombinant) injection 1 mg PRN    glucose chewable tablet 16 g PRN    glucose chewable tablet 24 g PRN    heparin (porcine) injection 5,000 Units Q8H    insulin aspart pen 0-5 Units " QID (AC + HS) PRN    mycophenolate capsule 1,000 mg BID    oxycodone immediate release tablet 10 mg Q6H PRN    oxyCODONE immediate release tablet 5 mg Q6H PRN    predniSONE tablet 20 mg Daily    simethicone chewable tablet 80 mg TID PRN    tacrolimus capsule 2 mg BID    traMADol tablet 50 mg Q4H PRN    valganciclovir 50 mg/ml oral solution 100 mg Every Mon, Wed, Fri       Objective:     Vital Signs (Most Recent):  Temp: 98.4 °F (36.9 °C) (10/27/17 1056)  Pulse: 95 (10/27/17 1100)  Resp: 18 (10/27/17 1056)  BP: 124/63 (10/27/17 1056)  SpO2: 97 % (10/27/17 1056)  O2 Device (Oxygen Therapy): room air (10/27/17 1056) Vital Signs (24h Range):  Temp:  [98.1 °F (36.7 °C)-98.8 °F (37.1 °C)] 98.4 °F (36.9 °C)  Pulse:  [] 95  Resp:  [16-18] 18  SpO2:  [94 %-100 %] 97 %  BP: (101-124)/(53-77) 124/63     Weight: 81.2 kg (179 lb 0.2 oz) (10/27/17 0445)  Body mass index is 28.04 kg/m².  Body surface area is 1.96 meters squared.    I/O last 3 completed shifts:  In: 2750 [P.O.:1680; Blood:270; Other:800]  Out: 3550 [Urine:50; Other:3500]    Physical Exam   Constitutional: He appears well-developed and well-nourished. No distress.   HENT:   Head: Normocephalic and atraumatic.   Cardiovascular: Normal rate and regular rhythm.    Pulmonary/Chest: Effort normal. He has decreased breath sounds. He has no wheezes. He has no rales.   Abdominal: Soft.   Musculoskeletal: He exhibits edema. He exhibits no deformity.   Neurological: He is alert.   Skin: Skin is warm.       Significant Labs:  CBC:   Recent Labs  Lab 10/27/17  0500   WBC 10.81   RBC 2.84*   HGB 8.6*   HCT 25.0*   PLT 64*   MCV 88   MCH 30.3   MCHC 34.4     CMP:   Recent Labs  Lab 10/27/17  0500   GLU 95   CALCIUM 7.3*   ALBUMIN 2.0*   PROT 4.1*   *   K 3.9   CO2 26      BUN 34*   CREATININE 2.9*   ALKPHOS 213*   ALT 18   AST 48*   BILITOT 4.0*     All labs within the past 24 hours have been reviewed.     Significant Imaging:  Labs:  Reviewed    Assessment/Plan:     LAURA (acute kidney injury)    - likely ischemic ATN from decreased renal perfusion (volume shifts from paracentesis, anemia). Urine sediment with muddy brown casts  - underwent OLTx on 10/19; received intraop CRRT  - remains dialysis dependent at this time  - started making urine again last night. F/u response to lasix 100mg IV this AM. If no response, recommend trying lasix 160mg IV this afternoon  - will tentatively plan for HD tomorrow if no improvement in sCr  - hopeful renal function will recover soon  - strict I/O            Moon Carlton, PGY-5  Nephrology Fellow  [facility]  Pager: [phone]    ATTENDING PHYSICIAN ATTESTATION  I have personally interviewed and examined the patient. I thoroughly reviewed the demographic, clinical, laboratorial and imaging information available in medical records. I agree with the assessment and recommendations provided by the subspecialty resident. Dr. Carlton was under my supervision.

## 2017-10-27 NOTE — PROGRESS NOTES
[facility]  Liver Transplant  Progress Note    Patient Name: Jhonny Diana  MRN: [id number]  Admission Date: 2017  Hospital Length of Stay: 30 days  Code Status: Full Code  Primary Care Provider: Provider Notinsystem  Post-Operative Day: 8    ORGAN:   LIVER  Disease Etiology: Acute Alcoholic Hepatitis  Donor Type:    - Brain Death  CDC High Risk:   No  Donor CMV Status:   Donor CMV Status: Positive  Donor HBcAB:   Negative  Donor HCV Status:   Negative  Whole or Partial: Whole Liver  Biliary Anastomosis: End to End  Arterial Anatomy: Standard  Subjective:     History of Present Illness:  Jhonny Diana is a 26 yo male with hx of childhood asthma and heavy alcohol abuse (fifth of liquor daily since teenager, last drink 2.5 wks ago per mother) who was transferred to [facility] for higher level of care/liver transplant evaluation. Patient initially presented to OSH for a 2 mo hx of progressively worsening abdominal pain with associated fatigue, jaundice, and abdominal distention with ~20# weight gain.  Reports family hx of alcoholic cirrhosis in father. Pt was recently diagnosed with alcoholic hepatitis during his admission at OSH on -9/15. During that hospitalization, patient was given ceftriaxone for SBP ppx, prednisone for alcoholic hepatitis, and diuresed. Pt was discharged and instructed to follow up with PCP and AA within 1 wk. After his discharge, patient returned to ED 4 days after for increased weight gain and worsening abdominal pain/jaundice.     Mr. Diana was admitted to the [facility] on  for decompensated alcoholic hepatitis with high MELD score, severe HE, and LAURA. Hepatology consulted and followed to evaluate liver transplant candidacy. Pt started on albumin and lasix for LAURA and possibility for HRS. However, nephrology felt that LAURA is likely multifactorial: ATN given hypotensive episodes at OSH and toxic given severe hyperbilirubinemia; not truly HRS. Lactulose/rifaximin started  for HE and mental status slowly improved. Mr. Diana with significant ABD pain since admission with ABD distension. CT abdomen was significant for pericolonic fat stranding of right colon; no obstruction. Also with diffuse mesenteric edema. No significant ascites noted, no cholecystitis/cholelithiasis/ductal dilation. US liver with doppler showed appropriate vasculature.  He was stepped down to Hospital Medicine on 10/5.  Paracentesis was done on 10/9 with 3.3L removed and 10/12 with 1.8 removed, no infection. His diet was advanced and Dietary was consulted to help get adequate caloric intake. Listed for liver transplant 10/17 with MELD 36.    Pt received organ offer and underwent liver txp 10/19 which took place without complication.  Steroid induction used, CMV +/+.  Post operatively, LFTs/enzymes trended down nicely.  POD 5 U/S with elevated RIs and sluggish flow in HA system, otherwise ok.  Pt with LAURA prior to and s/p txp requiring dialysis.  He is tolerating HD with last HD 10/26.          Hospital Course:  Interval hx:  Pt reports uncontrolled abdominal pain today. No BM since immediately PO- bowel regimen ordered.  He reports hallucinations with oxycodone- will try tramadol with scheduled tylenol.  Worked well with OT today.   He reports UOP last night and this AM- will push with lasix.  Will need permcath placement next Monday if UOP does not continue to improve in prep for discharge.     Scheduled Meds:   acetaminophen  500 mg Oral Q6H    albuterol-ipratropium 2.5mg-0.5mg/3mL  3 mL Nebulization Q6H WAKE    atovaquone  1,500 mg Oral Daily    bisacodyl  10 mg Oral Daily    docusate sodium  100 mg Oral TID PC    ergocalciferol  50,000 Units Oral Q7 Days    famotidine  20 mg Oral QHS    fluconazole  200 mg Oral Daily    heparin (porcine)  5,000 Units Subcutaneous Q8H    mycophenolate  1,000 mg Oral BID    predniSONE  20 mg Oral Daily    tacrolimus  2 mg Oral BID    valganciclovir 50 mg/ml  100 mg  Oral Every Mon, Wed, Fri     Continuous Infusions:   PRN Meds:sodium chloride, dextrose 50%, dextrose 50%, glucagon (human recombinant), glucose, glucose, insulin aspart, oxyCODONE, oxyCODONE, simethicone, traMADol    Review of Systems   Constitutional: Positive for activity change, appetite change and fatigue. Negative for diaphoresis and fever.   HENT: Negative for mouth sores.    Respiratory: Negative for cough, choking, shortness of breath and wheezing.    Cardiovascular: Positive for leg swelling. Negative for chest pain and palpitations.   Gastrointestinal: Positive for abdominal distention. Negative for abdominal pain, constipation, diarrhea, nausea and vomiting.   Genitourinary: Positive for decreased urine volume. Negative for difficulty urinating.   Musculoskeletal: Negative for arthralgias, back pain and joint swelling.   Skin: Positive for wound.   Allergic/Immunologic: Positive for immunocompromised state.   Neurological: Negative for tremors, weakness and headaches.   Hematological: Bruises/bleeds easily.   Psychiatric/Behavioral: Positive for dysphoric mood and hallucinations. Negative for confusion and sleep disturbance.   All other systems reviewed and are negative.    Objective:     Vital Signs (Most Recent):  Temp: 98.4 °F (36.9 °C) (10/27/17 1056)  Pulse: 89 (10/27/17 1422)  Resp: 18 (10/27/17 1422)  BP: 124/63 (10/27/17 1056)  SpO2: 98 % (10/27/17 1422) Vital Signs (24h Range):  Temp:  [98.1 °F (36.7 °C)-98.8 °F (37.1 °C)] 98.4 °F (36.9 °C)  Pulse:  [] 89  Resp:  [16-18] 18  SpO2:  [94 %-100 %] 98 %  BP: (101-124)/(53-77) 124/63     Weight: 81.2 kg (179 lb 0.2 oz)  Body mass index is 28.04 kg/m².    Intake/Output - Last 3 Shifts       10/25 0700 - 10/26 0659 10/26 0700 - 10/27 0659 10/27 0700 - 10/28 0659    P.O. 1080 1320     Blood  270     Other  800     Total Intake(mL/kg) 1080 (13.4) 2390 (29.4)     Urine (mL/kg/hr) 0 (0) 50 (0)     Other  3500 (1.8)     Stool 0 (0) 0 (0)     Total  Output 0 3550      Net +1080 -1160             Urine Occurrence 0 x 0 x     Stool Occurrence 0 x 0 x           Physical Exam   Constitutional: He is oriented to person, place, and time. He appears well-developed. No distress.   Temporal and distal extremity muscle wasting   HENT:   Head: Normocephalic and atraumatic.   Mouth/Throat: No oropharyngeal exudate.   Eyes: EOM are normal. Pupils are equal, round, and reactive to light. Scleral icterus is present.   Neck: Normal range of motion. Neck supple.   Cardiovascular: Normal rate, regular rhythm and normal heart sounds.  Exam reveals no gallop and no friction rub.    No murmur heard.  Pulmonary/Chest: Effort normal. No respiratory distress. He has no rales.   Dec R base   Abdominal: Soft. Bowel sounds are normal. He exhibits distension. There is no tenderness.   Chevron inc cdi no ssi   Musculoskeletal: Normal range of motion. He exhibits edema (1-2+ generalized).   Neurological: He is alert and oriented to person, place, and time.   Skin: Skin is warm and dry. He is not diaphoretic.   jaundiced   Psychiatric: He has a normal mood and affect. His behavior is normal. Judgment and thought content normal.   Nursing note and vitals reviewed.      Laboratory:  Immunosuppressants         Stop Route Frequency     tacrolimus capsule 2 mg      -- Oral 2 times daily     mycophenolate capsule 1,000 mg      -- Oral 2 times daily        CBC:     Recent Labs  Lab 10/27/17  0500   WBC 10.81   RBC 2.84*   HGB 8.6*   HCT 25.0*   PLT 64*   MCV 88   MCH 30.3   MCHC 34.4     CMP:     Recent Labs  Lab 10/27/17  0500   GLU 95   CALCIUM 7.3*   ALBUMIN 2.0*   PROT 4.1*   *   K 3.9   CO2 26      BUN 34*   CREATININE 2.9*   ALKPHOS 213*   ALT 18   AST 48*   BILITOT 4.0*     Labs within the past 24 hours have been reviewed.    Diagnostic Results:  I have personally reviewed all pertinent imaging studies.    Assessment/Plan:     * Liver transplant 10/19/2017 for acute alcoholic  hepatitis    - LFTs/enzymes trended down nicely  - Bowel regimen started today, other than full return of bowels has met all post surgical marks  - Transition pain control to scheduled acetaminophen + Tramadol as hallucinations with oxycodone.  - Liver US POD 5 with elevated RIs and slowed flow HA system- will monitor labs and repeat prior to discharge.            LAURA (acute kidney injury)    - Pt reports UOP last night and this AM  - Lasix 100 mg x 1 today  - Nephrology following with us.  - HD last 10/26- tolerated well.  - Plan for permcath placement Monday if without continued improvement in prep for discharge        Long-term use of immunosuppressant medication    - See prophylactic immunotherapy          Prophylactic immunotherapy    - Continue tacrolimus, prednisone taper, Cellcept.  - Will monitor for signs of toxic side effects, check daily tacrolimus troughs, and change meds accordingly.        Acute blood loss anemia    - PRBC x 2 u 10/26 and responded nicely  - Monitor        Anasarca    - 2/2 liver disease, LAURA, and hypoalbuminemia  - Expect improvement once kidneys improve.    - Attempt to keep dressings dry        At risk for opportunistic infections    - D/C Dapsone secondary to worsening anemia and start atovaquone for PCP prophylaxis  - Continue Valcyte for CMV prophylaxis  - Continue fluconazole for fungal prophylaxis          Severe malnutrition    - Dietary consulted and following.   - Continue protein powder        Alcoholism /alcohol abuse    - Psych consulted, high risk for transplant. To complete ABU program s/p transplant          Physical deconditioning    - Working with PT/OT  - Monitor for outpt needs              VTE Risk Mitigation         Ordered     heparin (porcine) injection 5,000 Units  Every 8 hours     Route:  Subcutaneous        10/20/17 0346     Place sequential compression device  Until discontinued      10/20/17 0346     High Risk of VTE  Once      10/20/17 0346          The  patients clinical status was discussed at multidisplinary rounds, involving transplant surgery, transplant medicine, pharmacy, nursing, nutrition, and social work    Discharge Planning:  Monitor kidney function --> may need outpt HD  Monitor HH needs vs rehab for deconditioned status      Geronimo Vera PA-C  Liver Transplant  [facility]

## 2017-10-27 NOTE — PLAN OF CARE
"Patient's VSS for shift. Patient continues to be AAOx4, calm, cooperative, and pleasant. He is a two person assist secondary to weakness in moving from chair to bed and vice versa. Patient had no pain at beginning of shift. He began to complain of significant "pulling" pain on his abdomen after he worked with occupational therapy. As patient had hallucinatory reaction to oxycodone last night, Geronimo Vera put in for acetaminophen and tramadol for pain. Patient stated that pain significantly better after rest, repositioning, and medication. Patient worked on bills and disability paperwork in afternoon which he stated began to build up on him and intensify his anxiety. See note for afternoon event. Patient told to lie back in bed, practice slow breathing, and a cool cloth placed on his head. He was also given 0.5 ml of Ativan. Patient is now resting quietly. Mother is pulling self-meds 100% and painting incision with betadine. Patient is using incentive spirometer independently. Patient and mother did go around the hospital in wheelchair again to explore hospital. Patient still anuric. Complained of gas pains in the morning. Mother stated he had loose bowel movements right after surgery but has not had a bowel movement in several days. Geronimo F again informed. Patient put on a bowel regimen. Patient declined suppository this shift. Will hold dose and try again on next shift. Stated discomfort was somewhat abated with simethicone. Otherwise patient still oozing significantly from right side. Dressing changed 2 times today. Geronimo put stitches in the site in the afternoon. Ordered wound vac-may be put on tomorrow.   "

## 2017-10-27 NOTE — ASSESSMENT & PLAN NOTE
- 2/2 liver disease, LAURA, and hypoalbuminemia  - Expect improvement once kidneys improve.    - Attempt to keep dressings dry   Encounter Date: 6/8/2017    ED Physician Progress Notes         EKG - STEMI Decision  Initial Reading: No STEMI present.

## 2017-10-27 NOTE — PT/OT/SLP RE-EVAL
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    OT Date of Treatment: 10/27/17   OT Start Time: 1002  OT Stop Time: 1029  OT Total Time (min): 27 min    Billable Minutes:  Re-eval 19  Therapeutic Activity 8    Diagnosis: Liver replaced by transplant       Past Medical History:   Diagnosis Date    Alcoholic hepatitis with ascites     Alcoholic hepatitis with ascites     History of hematemesis 9/28/2017    Hypertension       Past Surgical History:   Procedure Laterality Date    APPENDECTOMY         Referring physician: Alin Lpóez Jr., MD  Date referred to OT: 10/26/17    General Precautions: Standard, fall  Orthopedic Precautions: N/A  Braces: N/A    Patient History:  Living Environment  Lives With:  (with a roomate. Pt will live with mother upon discharge )  Living Arrangements: house  Home Accessibility:  (4 steps to enter home with bilateral handrails )  Transportation Available: car, family or friend will provide  Living Environment Comment: Following d/c pt will be living with mother in [place]SH with no ALEXA. He has a tub-shower combo with no DME. PTA, pt reports beign I with ADL and functional mobility. 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 His hobbies include playing video games, reading, and cooking.   Equipment Currently Used at Home: none    Prior level of function:   Bed Mobility/Transfers: independent  Grooming: independent  Bathing: independent  Upper Body Dressing: independent  Lower Body Dressing: independent  Toileting: independent  Home Management Skills: independent  Homemaking Responsibilities: No     Dominant hand: right    Subjective:  Communicated with RN prior to session.  Pt agreeable to participate with therapy. " I'm in so much pain I feel frozen"   Chief Complaint: Pain and cramps in abdomen region.   Patient/Family stated goals: Return to PLOF    Pain/Comfort  Pain Rating 1: 9/10  Location - Orientation 1: " lower  Location 1: abdomen  Pain Addressed 1: Cessation of Activity, Nurse notified, Reposition  Pain Rating Post-Intervention 1: 9/10    Objective:  Patient found with: telemetry, blood pressure cuff, pulse ox (continuous), PICC line, central line    Cognitive Exam:  Oriented to: Person, Place, Time and Situation  Follows Commands/attention: Follows multistep  commands  Communication: clear/fluent  Memory:  No Deficits noted  Safety awareness/insight to disability: impaired  Coping skills/emotional control: Appropriate to situation    Visual/perceptual:  Intact    Physical Exam:  Postural examination/scapula alignment: Rounded shoulder and Head forward  Skin integrity: Visible skin intact  Edema: None noted     Sensation:   Intact    Upper Extremity Range of Motion:  Right Upper Extremity: WFL  Left Upper Extremity: WFL    Upper Extremity Strength:  Right Upper Extremity: WFL 4/5  Left Upper Extremity: WFL  4/5   Strength: Good    Fine motor coordination:   Intact    Gross motor coordination: WFL    Functional Mobility:  Bed Mobility:  Rolling/Turning to Left:  (Pt found sitting UIC)    Transfers:  Sit <> Stand Assistance: Moderate Assistance (from bedside chair)  Sit <> Stand Assistive Device: No Assistive Device    Functional Ambulation: Pt required max encouragement to take steps 2/2 pain. He tool one left and right side steps with Mod A for balance.     Activities of Daily Living:     UE Dressing Level of Assistance: Maximum assistance (to lenora gown like jacket while seated EOB-- limited 2/2 pain)    LE Dressing Level of Assistance: Total assistance (to lenora B socks while seated UIC)    Grooming Position: bedside chair (to wash face)  Grooming Level of Assistance: Stand by assistance    Balance:   Static Sit: GOOD: Takes MODERATE challenges from all directions  Dynamic Sit: GOOD: Maintains balance through MODERATE excursions of active trunk movement  Static Stand: POOR+: Needs MINIMAL assist to  "maintain  Dynamic stand: POOR: Mod A    Therapeutic Activities and Exercises:  -Pt edu on OT role/POC  -Importance of OOB activity with staff assistance  -Safety during functional t/f and mobility   -Communication board updated  - Pt edu on BUE/BLE AROM exercises to perform daily while seated UIC or supine in bed-- pt demo understanding.     ** Pt motivated to return to PLOF however limited this date 2/2 pain.     AM-PAC 6 CLICK ADL  How much help from another person does this patient currently need?  1 = Unable, Total/Dependent Assistance  2 = A lot, Maximum/Moderate Assistance  3 = A little, Minimum/Contact Guard/Supervision  4 = None, Modified [place]/Independent    Putting on and taking off regular lower body clothing? : 1  Bathing (including washing, rinsing, drying)?: 1  Toileting, which includes using toilet, bedpan, or urinal? : 2  Putting on and taking off regular upper body clothing?: 2  Taking care of personal grooming such as brushing teeth?: 3  Eating meals?: 4  Total Score: 13    AM-PAC Raw Score CMS "G-Code Modifier Level of Impairment Assistance   6 % Total / Unable   7 - 9 CM 80 - 100% Maximal Assist   10 - 14 CL 60 - 80% Moderate Assist   15 - 19 CK 40 - 60% Moderate Assist   20 - 22 CJ 20 - 40% Minimal Assist   23 CI 1-20% SBA / CGA   24 CH 0% Independent/ Mod I       Patient left up in chair with all lines intact, call button in reach and RN notified    Assessment:  Jhonny Diana is a 27 y.o. male with a medical diagnosis of Liver replaced by transplant and presents with overall pain/cramping in abdomen. Evaluation limited this date however may progress quickly. Pt would benefit from continued skilled OT to address deficits listed below and maximize return to PLOF. OT recommending rehab ( may progress to ) following d/c to increase overall independence and safety with ADL.     Rehab identified problem list/impairments: Rehab identified problem list/impairments: weakness, impaired " endurance, impaired functional mobilty, impaired balance, impaired self care skills, pain    Rehab potential is good.    Activity tolerance: Fair    Discharge recommendations: Discharge Facility/Level Of Care Needs: rehabilitation facility (may progress quickly )     Barriers to discharge: Barriers to Discharge:  (increased level of assistance at this time-- may progress quickkly)    Equipment recommendations:  (TBD-- pending progress)     GOALS:    Occupational Therapy Goals        Problem: Occupational Therapy Goal    Goal Priority Disciplines Outcome Interventions   Occupational Therapy Goal     OT, PT/OT Ongoing (interventions implemented as appropriate)    Description:  Goals to be met by:  11/10/17    Patient will increase functional independence with ADLs by performing:    UE Dressing with Minimal Assistance.  LE Dressing with Moderate Assistance with AD as needed.  Grooming while standing at sink with Minimal Assistance.  Toileting from toilet with Minimal Assistance for hygiene and clothing management.   Supine to sit with Moderate Assistance.  Toilet transfer to bedside commode with Minimal Assistance.  Upper extremity exercise program per handout, with independence.                                  PLAN:  Patient to be seen 5 x/week to address the above listed problems via self-care/home management, therapeutic activities, therapeutic exercises, neuromuscular re-education  Plan of Care expires: 11/26/17  Plan of Care reviewed with: patient, mother         Qi wade, OT  10/27/2017

## 2017-10-27 NOTE — ASSESSMENT & PLAN NOTE
- Pt reports UOP last night and this AM  - Lasix 100 mg x 1 today  - Nephrology following with us.  - HD last 10/26- tolerated well.  - Plan for permcath placement Monday if without continued improvement in prep for discharge

## 2017-10-27 NOTE — SUBJECTIVE & OBJECTIVE
"Interval History:   Received HD yesterday x 3 hours; 2.7L removed. No events overnight. UOP 50cc yesterday. Patient also reports some urine this morning. SOB improved. Still with tense edema that he describes as "bothersome". Net negative 1L yesterday.  Received lasix 100mg IV x1 around 11am this morning.     Review of patient's allergies indicates:   Allergen Reactions    Bactrim [sulfamethoxazole-trimethoprim] Other (See Comments)     Mookie Trell Syndrome     Current Facility-Administered Medications   Medication Frequency    0.9%  NaCl infusion (for blood administration) Q24H PRN    acetaminophen tablet 500 mg Q6H    albuterol-ipratropium 2.5mg-0.5mg/3mL nebulizer solution 3 mL Q6H WAKE    atovaquone suspension 1,500 mg Daily    bisacodyl EC tablet 10 mg Daily    dextrose 50% injection 12.5 g PRN    dextrose 50% injection 25 g PRN    docusate sodium capsule 100 mg TID PC    ergocalciferol capsule 50,000 Units Q7 Days    famotidine tablet 20 mg QHS    fluconazole tablet 200 mg Daily    glucagon (human recombinant) injection 1 mg PRN    glucose chewable tablet 16 g PRN    glucose chewable tablet 24 g PRN    heparin (porcine) injection 5,000 Units Q8H    insulin aspart pen 0-5 Units QID (AC + HS) PRN    mycophenolate capsule 1,000 mg BID    oxycodone immediate release tablet 10 mg Q6H PRN    oxyCODONE immediate release tablet 5 mg Q6H PRN    predniSONE tablet 20 mg Daily    simethicone chewable tablet 80 mg TID PRN    tacrolimus capsule 2 mg BID    traMADol tablet 50 mg Q4H PRN    valganciclovir 50 mg/ml oral solution 100 mg Every Mon, Wed, Fri       Objective:     Vital Signs (Most Recent):  Temp: 98.4 °F (36.9 °C) (10/27/17 1056)  Pulse: 95 (10/27/17 1100)  Resp: 18 (10/27/17 1056)  BP: 124/63 (10/27/17 1056)  SpO2: 97 % (10/27/17 1056)  O2 Device (Oxygen Therapy): room air (10/27/17 1056) Vital Signs (24h Range):  Temp:  [98.1 °F (36.7 °C)-98.8 °F (37.1 °C)] 98.4 °F (36.9 °C)  Pulse:  " [] 95  Resp:  [16-18] 18  SpO2:  [94 %-100 %] 97 %  BP: (101-124)/(53-77) 124/63     Weight: 81.2 kg (179 lb 0.2 oz) (10/27/17 0445)  Body mass index is 28.04 kg/m².  Body surface area is 1.96 meters squared.    I/O last 3 completed shifts:  In: 2750 [P.O.:1680; Blood:270; Other:800]  Out: 3550 [Urine:50; Other:3500]    Physical Exam   Constitutional: He appears well-developed and well-nourished. No distress.   HENT:   Head: Normocephalic and atraumatic.   Cardiovascular: Normal rate and regular rhythm.    Pulmonary/Chest: Effort normal. He has decreased breath sounds. He has no wheezes. He has no rales.   Abdominal: Soft.   Musculoskeletal: He exhibits edema. He exhibits no deformity.   Neurological: He is alert.   Skin: Skin is warm.       Significant Labs:  CBC:   Recent Labs  Lab 10/27/17  0500   WBC 10.81   RBC 2.84*   HGB 8.6*   HCT 25.0*   PLT 64*   MCV 88   MCH 30.3   MCHC 34.4     CMP:   Recent Labs  Lab 10/27/17  0500   GLU 95   CALCIUM 7.3*   ALBUMIN 2.0*   PROT 4.1*   *   K 3.9   CO2 26      BUN 34*   CREATININE 2.9*   ALKPHOS 213*   ALT 18   AST 48*   BILITOT 4.0*     All labs within the past 24 hours have been reviewed.     Significant Imaging:  Labs: Reviewed

## 2017-10-27 NOTE — ASSESSMENT & PLAN NOTE
- LFTs/enzymes trended down nicely  - Bowel regimen started today, other than full return of bowels has met all post surgical marks  - Transition pain control to scheduled acetaminophen + Tramadol as hallucinations with oxycodone.  - Liver US POD 5 with elevated RIs and slowed flow HA system- will monitor labs and repeat prior to discharge.

## 2017-10-27 NOTE — SUBJECTIVE & OBJECTIVE
Scheduled Meds:   acetaminophen  500 mg Oral Q6H    albuterol-ipratropium 2.5mg-0.5mg/3mL  3 mL Nebulization Q6H WAKE    atovaquone  1,500 mg Oral Daily    bisacodyl  10 mg Oral Daily    docusate sodium  100 mg Oral TID PC    ergocalciferol  50,000 Units Oral Q7 Days    famotidine  20 mg Oral QHS    fluconazole  200 mg Oral Daily    heparin (porcine)  5,000 Units Subcutaneous Q8H    mycophenolate  1,000 mg Oral BID    predniSONE  20 mg Oral Daily    tacrolimus  2 mg Oral BID    valganciclovir 50 mg/ml  100 mg Oral Every Mon, Wed, Fri     Continuous Infusions:   PRN Meds:sodium chloride, dextrose 50%, dextrose 50%, glucagon (human recombinant), glucose, glucose, insulin aspart, oxyCODONE, oxyCODONE, simethicone, traMADol    Review of Systems   Constitutional: Positive for activity change, appetite change and fatigue. Negative for diaphoresis and fever.   HENT: Negative for mouth sores.    Respiratory: Negative for cough, choking, shortness of breath and wheezing.    Cardiovascular: Positive for leg swelling. Negative for chest pain and palpitations.   Gastrointestinal: Positive for abdominal distention. Negative for abdominal pain, constipation, diarrhea, nausea and vomiting.   Genitourinary: Positive for decreased urine volume. Negative for difficulty urinating.   Musculoskeletal: Negative for arthralgias, back pain and joint swelling.   Skin: Positive for wound.   Allergic/Immunologic: Positive for immunocompromised state.   Neurological: Negative for tremors, weakness and headaches.   Hematological: Bruises/bleeds easily.   Psychiatric/Behavioral: Positive for dysphoric mood and hallucinations. Negative for confusion and sleep disturbance.   All other systems reviewed and are negative.    Objective:     Vital Signs (Most Recent):  Temp: 98.4 °F (36.9 °C) (10/27/17 1056)  Pulse: 89 (10/27/17 1422)  Resp: 18 (10/27/17 1422)  BP: 124/63 (10/27/17 1056)  SpO2: 98 % (10/27/17 1422) Vital Signs (24h  Range):  Temp:  [98.1 °F (36.7 °C)-98.8 °F (37.1 °C)] 98.4 °F (36.9 °C)  Pulse:  [] 89  Resp:  [16-18] 18  SpO2:  [94 %-100 %] 98 %  BP: (101-124)/(53-77) 124/63     Weight: 81.2 kg (179 lb 0.2 oz)  Body mass index is 28.04 kg/m².    Intake/Output - Last 3 Shifts       10/25 0700 - 10/26 0659 10/26 0700 - 10/27 0659 10/27 0700 - 10/28 0659    P.O. 1080 1320     Blood  270     Other  800     Total Intake(mL/kg) 1080 (13.4) 2390 (29.4)     Urine (mL/kg/hr) 0 (0) 50 (0)     Other  3500 (1.8)     Stool 0 (0) 0 (0)     Total Output 0 3550      Net +1080 -1160             Urine Occurrence 0 x 0 x     Stool Occurrence 0 x 0 x           Physical Exam   Constitutional: He is oriented to person, place, and time. He appears well-developed. No distress.   Temporal and distal extremity muscle wasting   HENT:   Head: Normocephalic and atraumatic.   Mouth/Throat: No oropharyngeal exudate.   Eyes: EOM are normal. Pupils are equal, round, and reactive to light. Scleral icterus is present.   Neck: Normal range of motion. Neck supple.   Cardiovascular: Normal rate, regular rhythm and normal heart sounds.  Exam reveals no gallop and no friction rub.    No murmur heard.  Pulmonary/Chest: Effort normal. No respiratory distress. He has no rales.   Dec R base   Abdominal: Soft. Bowel sounds are normal. He exhibits distension. There is no tenderness.   Chevron inc cdi no ssi   Musculoskeletal: Normal range of motion. He exhibits edema (1-2+ generalized).   Neurological: He is alert and oriented to person, place, and time.   Skin: Skin is warm and dry. He is not diaphoretic.   jaundiced   Psychiatric: He has a normal mood and affect. His behavior is normal. Judgment and thought content normal.   Nursing note and vitals reviewed.      Laboratory:  Immunosuppressants         Stop Route Frequency     tacrolimus capsule 2 mg      -- Oral 2 times daily     mycophenolate capsule 1,000 mg      -- Oral 2 times daily        CBC:     Recent  Labs  Lab 10/27/17  0500   WBC 10.81   RBC 2.84*   HGB 8.6*   HCT 25.0*   PLT 64*   MCV 88   MCH 30.3   MCHC 34.4     CMP:     Recent Labs  Lab 10/27/17  0500   GLU 95   CALCIUM 7.3*   ALBUMIN 2.0*   PROT 4.1*   *   K 3.9   CO2 26      BUN 34*   CREATININE 2.9*   ALKPHOS 213*   ALT 18   AST 48*   BILITOT 4.0*     Labs within the past 24 hours have been reviewed.    Diagnostic Results:  I have personally reviewed all pertinent imaging studies.

## 2017-10-27 NOTE — PROGRESS NOTES
Called into patient's room by mother who stated that patient was having significant anxiety. Patient breathing faster than normal and taking shallow breaths. Stating that events just build and build until they overwhelm you. Spoke with Geronimo ROBERSON who stated she would put in something for anxiety.

## 2017-10-27 NOTE — PLAN OF CARE
Problem: Occupational Therapy Goal  Goal: Occupational Therapy Goal  Goals to be met by:  11/10/17    Patient will increase functional independence with ADLs by performing:    UE Dressing with Minimal Assistance.  LE Dressing with Moderate Assistance with AD as needed.  Grooming while standing at sink with Minimal Assistance.  Toileting from toilet with Minimal Assistance for hygiene and clothing management.   Supine to sit with Moderate Assistance.  Toilet transfer to bedside commode with Minimal Assistance.  Upper extremity exercise program per handout, with independence.                Outcome: Ongoing (interventions implemented as appropriate)  Re-eval completed. Initiate POC.   Qi olvera OT  10/27/2017

## 2017-10-27 NOTE — ASSESSMENT & PLAN NOTE
- likely ischemic ATN from decreased renal perfusion (volume shifts from paracentesis, anemia). Urine sediment with muddy brown casts  - underwent OLTx on 10/19; received intraop CRRT  - remains dialysis dependent at this time  - started making urine again last night. F/u response to lasix 100mg IV this AM. If no response, recommend trying lasix 160mg IV this afternoon  - will tentatively plan for HD tomorrow if no improvement in sCr  - hopeful renal function will recover soon  - strict I/O

## 2017-10-27 NOTE — PLAN OF CARE
Problem: Patient Care Overview  Goal: Plan of Care Review  Outcome: Ongoing (interventions implemented as appropriate)  -AAOx4  -RIJ trialysis, cdi  -left arm midline, sl, cdi due to be changed today  -tele SR  -Chevron incision, elias with staples, cdi edges well approximated.   -HD yesterday, 2.7L removed  -BG checks ACHS. Last , no SSI given.   -Educated on self meds. Pulled PM meds 100%    Problem: Fall Risk (Adult)  Goal: Identify Related Risk Factors and Signs and Symptoms  Related risk factors and signs and symptoms are identified upon initiation of Human Response Clinical Practice Guideline (CPG)   Outcome: Ongoing (interventions implemented as appropriate)  -2 person assist.   -Commode @ bedside  -Pt free from falls/ injuries thus far during shift.  -Bed in low, locked position. Bed rails up x 2. Call light within reach.     Problem: Pressure Ulcer Risk (Syed Scale) (Adult,Obstetrics,Pediatric)  Goal: Identify Related Risk Factors and Signs and Symptoms  Related risk factors and signs and symptoms are identified upon initiation of Human Response Clinical Practice Guideline (CPG)   Outcome: Ongoing (interventions implemented as appropriate)  -free from breakdown    Problem: Infection, Risk/Actual (Adult)  Goal: Identify Related Risk Factors and Signs and Symptoms  Related risk factors and signs and symptoms are identified upon initiation of Human Response Clinical Practice Guideline (CPG)   Outcome: Ongoing (interventions implemented as appropriate)  -afebrile, tmax 98.7

## 2017-10-28 LAB
ALBUMIN SERPL BCP-MCNC: 1.9 G/DL
ALP SERPL-CCNC: 174 U/L
ALT SERPL W/O P-5'-P-CCNC: 14 U/L
ANION GAP SERPL CALC-SCNC: 10 MMOL/L
AST SERPL-CCNC: 30 U/L
BASOPHILS # BLD AUTO: 0 K/UL
BASOPHILS NFR BLD: 0 %
BILIRUB SERPL-MCNC: 3.6 MG/DL
BUN SERPL-MCNC: 43 MG/DL
CALCIUM SERPL-MCNC: 7.4 MG/DL
CHLORIDE SERPL-SCNC: 99 MMOL/L
CO2 SERPL-SCNC: 23 MMOL/L
CREAT SERPL-MCNC: 3.9 MG/DL
DIFFERENTIAL METHOD: ABNORMAL
EOSINOPHIL # BLD AUTO: 0.1 K/UL
EOSINOPHIL NFR BLD: 1.4 %
ERYTHROCYTE [DISTWIDTH] IN BLOOD BY AUTOMATED COUNT: 18.6 %
EST. GFR  (AFRICAN AMERICAN): 22.7 ML/MIN/1.73 M^2
EST. GFR  (NON AFRICAN AMERICAN): 19.7 ML/MIN/1.73 M^2
GLUCOSE SERPL-MCNC: 85 MG/DL
HCT VFR BLD AUTO: 23.8 %
HGB BLD-MCNC: 8.3 G/DL
IMM GRANULOCYTES # BLD AUTO: 0.22 K/UL
IMM GRANULOCYTES NFR BLD AUTO: 2.3 %
LYMPHOCYTES # BLD AUTO: 1.1 K/UL
LYMPHOCYTES NFR BLD: 11.9 %
MAGNESIUM SERPL-MCNC: 1.7 MG/DL
MCH RBC QN AUTO: 30.9 PG
MCHC RBC AUTO-ENTMCNC: 34.9 G/DL
MCV RBC AUTO: 89 FL
MONOCYTES # BLD AUTO: 0.9 K/UL
MONOCYTES NFR BLD: 9.1 %
NEUTROPHILS # BLD AUTO: 7.3 K/UL
NEUTROPHILS NFR BLD: 75.3 %
NRBC BLD-RTO: 0 /100 WBC
PLATELET # BLD AUTO: 94 K/UL
PMV BLD AUTO: 11.8 FL
POCT GLUCOSE: 146 MG/DL (ref 70–110)
POTASSIUM SERPL-SCNC: 4 MMOL/L
PROT SERPL-MCNC: 3.9 G/DL
RBC # BLD AUTO: 2.69 M/UL
SODIUM SERPL-SCNC: 132 MMOL/L
TACROLIMUS BLD-MCNC: 8.2 NG/ML
WBC # BLD AUTO: 9.61 K/UL

## 2017-10-28 PROCEDURE — 25000003 PHARM REV CODE 250: Performed by: TRANSPLANT SURGERY

## 2017-10-28 PROCEDURE — 25000242 PHARM REV CODE 250 ALT 637 W/ HCPCS: Performed by: STUDENT IN AN ORGANIZED HEALTH CARE EDUCATION/TRAINING PROGRAM

## 2017-10-28 PROCEDURE — 63600175 PHARM REV CODE 636 W HCPCS: Performed by: TRANSPLANT SURGERY

## 2017-10-28 PROCEDURE — 86706 HEP B SURFACE ANTIBODY: CPT

## 2017-10-28 PROCEDURE — 94640 AIRWAY INHALATION TREATMENT: CPT

## 2017-10-28 PROCEDURE — 99233 SBSQ HOSP IP/OBS HIGH 50: CPT | Mod: 24,,, | Performed by: PHYSICIAN ASSISTANT

## 2017-10-28 PROCEDURE — 94761 N-INVAS EAR/PLS OXIMETRY MLT: CPT

## 2017-10-28 PROCEDURE — 25000003 PHARM REV CODE 250: Performed by: STUDENT IN AN ORGANIZED HEALTH CARE EDUCATION/TRAINING PROGRAM

## 2017-10-28 PROCEDURE — 63600175 PHARM REV CODE 636 W HCPCS: Performed by: PHYSICIAN ASSISTANT

## 2017-10-28 PROCEDURE — 25000003 PHARM REV CODE 250: Performed by: PHYSICIAN ASSISTANT

## 2017-10-28 PROCEDURE — 80197 ASSAY OF TACROLIMUS: CPT

## 2017-10-28 PROCEDURE — 90935 HEMODIALYSIS ONE EVALUATION: CPT | Mod: ,,, | Performed by: INTERNAL MEDICINE

## 2017-10-28 PROCEDURE — 63600175 PHARM REV CODE 636 W HCPCS: Performed by: STUDENT IN AN ORGANIZED HEALTH CARE EDUCATION/TRAINING PROGRAM

## 2017-10-28 PROCEDURE — 20600001 HC STEP DOWN PRIVATE ROOM

## 2017-10-28 PROCEDURE — 90935 HEMODIALYSIS ONE EVALUATION: CPT

## 2017-10-28 PROCEDURE — 85025 COMPLETE CBC W/AUTO DIFF WBC: CPT

## 2017-10-28 PROCEDURE — 80053 COMPREHEN METABOLIC PANEL: CPT

## 2017-10-28 PROCEDURE — 94799 UNLISTED PULMONARY SVC/PX: CPT

## 2017-10-28 PROCEDURE — 83735 ASSAY OF MAGNESIUM: CPT

## 2017-10-28 PROCEDURE — 87340 HEPATITIS B SURFACE AG IA: CPT

## 2017-10-28 RX ORDER — SODIUM CHLORIDE 9 MG/ML
INJECTION, SOLUTION INTRAVENOUS
Status: DISCONTINUED | OUTPATIENT
Start: 2017-10-29 | End: 2017-10-30

## 2017-10-28 RX ORDER — FUROSEMIDE 10 MG/ML
100 INJECTION INTRAMUSCULAR; INTRAVENOUS ONCE
Status: COMPLETED | OUTPATIENT
Start: 2017-10-28 | End: 2017-10-28

## 2017-10-28 RX ORDER — OXYCODONE HYDROCHLORIDE 5 MG/1
5 TABLET ORAL EVERY 6 HOURS PRN
Status: DISCONTINUED | OUTPATIENT
Start: 2017-10-28 | End: 2017-11-01

## 2017-10-28 RX ADMIN — ACETAMINOPHEN 500 MG: 500 TABLET ORAL at 12:10

## 2017-10-28 RX ADMIN — FUROSEMIDE 100 MG: 10 INJECTION, SOLUTION INTRAVENOUS at 03:10

## 2017-10-28 RX ADMIN — HEPARIN SODIUM 5000 UNITS: 5000 INJECTION, SOLUTION INTRAVENOUS; SUBCUTANEOUS at 02:10

## 2017-10-28 RX ADMIN — MYCOPHENOLATE MOFETIL 1000 MG: 250 CAPSULE ORAL at 02:10

## 2017-10-28 RX ADMIN — SIMETHICONE CHEW TAB 80 MG 80 MG: 80 TABLET ORAL at 05:10

## 2017-10-28 RX ADMIN — BISACODYL 10 MG: 5 TABLET, COATED ORAL at 02:10

## 2017-10-28 RX ADMIN — SIMETHICONE CHEW TAB 80 MG 80 MG: 80 TABLET ORAL at 01:10

## 2017-10-28 RX ADMIN — SODIUM CHLORIDE 300 ML: 900 INJECTION, SOLUTION INTRAVENOUS at 10:10

## 2017-10-28 RX ADMIN — DOCUSATE SODIUM 100 MG: 100 CAPSULE, LIQUID FILLED ORAL at 09:10

## 2017-10-28 RX ADMIN — OXYCODONE HYDROCHLORIDE 5 MG: 5 TABLET ORAL at 08:10

## 2017-10-28 RX ADMIN — TRAMADOL HYDROCHLORIDE 50 MG: 50 TABLET, COATED ORAL at 08:10

## 2017-10-28 RX ADMIN — TACROLIMUS 3 MG: 1 CAPSULE ORAL at 05:10

## 2017-10-28 RX ADMIN — ACETAMINOPHEN 500 MG: 500 TABLET ORAL at 11:10

## 2017-10-28 RX ADMIN — OXYCODONE HYDROCHLORIDE 5 MG: 5 TABLET ORAL at 02:10

## 2017-10-28 RX ADMIN — HEPARIN SODIUM 5000 UNITS: 5000 INJECTION, SOLUTION INTRAVENOUS; SUBCUTANEOUS at 09:10

## 2017-10-28 RX ADMIN — HEPARIN SODIUM 5000 UNITS: 5000 INJECTION, SOLUTION INTRAVENOUS; SUBCUTANEOUS at 05:10

## 2017-10-28 RX ADMIN — IPRATROPIUM BROMIDE AND ALBUTEROL SULFATE 3 ML: .5; 3 SOLUTION RESPIRATORY (INHALATION) at 08:10

## 2017-10-28 RX ADMIN — DOCUSATE SODIUM 100 MG: 100 CAPSULE, LIQUID FILLED ORAL at 02:10

## 2017-10-28 RX ADMIN — FAMOTIDINE 20 MG: 20 TABLET, FILM COATED ORAL at 09:10

## 2017-10-28 RX ADMIN — ACETAMINOPHEN 500 MG: 500 TABLET ORAL at 05:10

## 2017-10-28 RX ADMIN — MYCOPHENOLATE MOFETIL 1000 MG: 250 CAPSULE ORAL at 09:10

## 2017-10-28 RX ADMIN — TACROLIMUS 3 MG: 1 CAPSULE ORAL at 08:10

## 2017-10-28 RX ADMIN — ACETAMINOPHEN 500 MG: 500 TABLET ORAL at 02:10

## 2017-10-28 RX ADMIN — PREDNISONE 20 MG: 10 TABLET ORAL at 02:10

## 2017-10-28 RX ADMIN — IPRATROPIUM BROMIDE AND ALBUTEROL SULFATE 3 ML: .5; 3 SOLUTION RESPIRATORY (INHALATION) at 07:10

## 2017-10-28 RX ADMIN — FLUCONAZOLE 200 MG: 200 TABLET ORAL at 02:10

## 2017-10-28 RX ADMIN — ATOVAQUONE 1500 MG: 750 SUSPENSION ORAL at 02:10

## 2017-10-28 NOTE — PT/OT/SLP PROGRESS
Physical Therapy      Jhonny Diana  MRN: [id number]    Patient not seen today secondary to  (Mother is refusing treatment). Wound site was open for the pt to apply wound vac.  Pts mother is refusing PT services for today.  Will follow-up with pt at the next scheduled tx session.    Tory Amato, PT

## 2017-10-28 NOTE — PLAN OF CARE
Problem: Patient Care Overview  Goal: Plan of Care Review  Outcome: Ongoing (interventions implemented as appropriate)  Plan of care reviewed with patient. Pt verbalized understanding. Pt AAOX4. Pt free from falls, injuries and trauma.  Pt free from skin breakdown.  Pt VSS and in NAD.  Pt afebrile.  Pt had no complaints of SOB, N/V or CP.  Pts pain moderately controlled with prn pain medications. Minimal  UOP this shift. 1 BM this shift. Pt had uneventful evening. Pt in low locked bed with personal items and call light within reach. Fall precautions maintained.

## 2017-10-28 NOTE — ASSESSMENT & PLAN NOTE
- LFTs/enzymes trended down nicely  - Bowel regimen started today, other than full return of bowels has met all post surgical marks  - Transitioned pain control to scheduled acetaminophen + Tramadol as hallucinations with oxycodone- tolerating well.  - Liver US POD 5 with elevated RIs and slowed flow HA system- will monitor labs and repeat prior to discharge.

## 2017-10-28 NOTE — PROGRESS NOTES
Patient received from floor with report from Rizwana Gan. Acute dialysis began per orders via right IJ trialysis catheter.

## 2017-10-28 NOTE — SUBJECTIVE & OBJECTIVE
Scheduled Meds:   acetaminophen  500 mg Oral Q6H    albuterol-ipratropium 2.5mg-0.5mg/3mL  3 mL Nebulization Q6H WAKE    atovaquone  1,500 mg Oral Daily    bisacodyl  10 mg Oral Daily    docusate sodium  100 mg Oral TID PC    ergocalciferol  50,000 Units Oral Q7 Days    famotidine  20 mg Oral QHS    fluconazole  200 mg Oral Daily    furosemide  100 mg Intravenous Once    heparin (porcine)  5,000 Units Subcutaneous Q8H    mycophenolate  1,000 mg Oral BID    predniSONE  20 mg Oral Daily    tacrolimus  3 mg Oral BID    valganciclovir 50 mg/ml  100 mg Oral Every Mon, Wed, Fri     Continuous Infusions:   PRN Meds:sodium chloride, [START ON 10/29/2017] sodium chloride 0.9%, dextrose 50%, dextrose 50%, glucagon (human recombinant), glucose, glucose, insulin aspart, simethicone, traMADol    Review of Systems   Constitutional: Positive for activity change, appetite change and fatigue. Negative for diaphoresis and fever.   HENT: Negative for mouth sores.    Respiratory: Negative for cough, choking, shortness of breath and wheezing.    Cardiovascular: Positive for leg swelling. Negative for chest pain and palpitations.   Gastrointestinal: Positive for abdominal distention. Negative for abdominal pain, constipation, diarrhea, nausea and vomiting.   Genitourinary: Positive for decreased urine volume. Negative for difficulty urinating.   Musculoskeletal: Negative for arthralgias, back pain and joint swelling.   Skin: Positive for wound.   Allergic/Immunologic: Positive for immunocompromised state.   Neurological: Negative for tremors, weakness and headaches.   Hematological: Bruises/bleeds easily.   Psychiatric/Behavioral: Positive for dysphoric mood. Negative for confusion, hallucinations and sleep disturbance. The patient is nervous/anxious.    All other systems reviewed and are negative.    Objective:     Vital Signs (Most Recent):  Temp: 98.2 °F (36.8 °C) (10/28/17 1222)  Pulse: 101 (10/28/17 1222)  Resp: 20  (10/28/17 1222)  BP: 118/62 (10/28/17 1222)  SpO2: 98 % (10/28/17 1222) Vital Signs (24h Range):  Temp:  [98.2 °F (36.8 °C)-98.5 °F (36.9 °C)] 98.2 °F (36.8 °C)  Pulse:  [] 101  Resp:  [16-20] 20  SpO2:  [97 %-100 %] 98 %  BP: (102-134)/(56-80) 118/62     Weight: 81.2 kg (179 lb 0.2 oz)  Body mass index is 28.04 kg/m².    Intake/Output - Last 3 Shifts       10/26 0700 - 10/27 0659 10/27 0700 - 10/28 0659 10/28 0700 - 10/29 0659    P.O. 1320 540 400    Blood 270      Other 800  800    Total Intake(mL/kg) 2390 (29.4) 540 (6.7) 1200 (14.8)    Urine (mL/kg/hr) 50 (0) 0 (0)     Other 3500 (1.8)  2600 (5.1)    Stool 0 (0) 0 (0) 0 (0)    Total Output 3550 0 2600    Net -1160 +540 -1400           Urine Occurrence 0 x 3 x     Stool Occurrence 0 x 0 x 0 x          Physical Exam   Constitutional: He is oriented to person, place, and time. He appears well-developed. No distress.   Temporal and distal extremity muscle wasting   HENT:   Head: Normocephalic and atraumatic.   Mouth/Throat: No oropharyngeal exudate.   Eyes: EOM are normal. Pupils are equal, round, and reactive to light. Scleral icterus is present.   Neck: Normal range of motion. Neck supple.   Cardiovascular: Normal rate, regular rhythm and normal heart sounds.  Exam reveals no gallop and no friction rub.    No murmur heard.  Pulmonary/Chest: Effort normal. No respiratory distress. He has no rales.   Dec R base   Abdominal: Soft. Bowel sounds are normal. He exhibits distension. There is no tenderness.   Chevron inc cdi no ssi   Musculoskeletal: Normal range of motion. He exhibits edema (1-2+ generalized).   Neurological: He is alert and oriented to person, place, and time.   Skin: Skin is warm and dry. He is not diaphoretic.   jaundiced   Psychiatric: He has a normal mood and affect. His behavior is normal. Judgment and thought content normal.   Nursing note and vitals reviewed.      Laboratory:  Immunosuppressants         Stop Route Frequency     tacrolimus  capsule 3 mg      -- Oral 2 times daily     mycophenolate capsule 1,000 mg      -- Oral 2 times daily        CBC:     Recent Labs  Lab 10/28/17  0500   WBC 9.61   RBC 2.69*   HGB 8.3*   HCT 23.8*   PLT 94*   MCV 89   MCH 30.9   MCHC 34.9     CMP:     Recent Labs  Lab 10/28/17  0500   GLU 85   CALCIUM 7.4*   ALBUMIN 1.9*   PROT 3.9*   *   K 4.0   CO2 23   CL 99   BUN 43*   CREATININE 3.9*   ALKPHOS 174*   ALT 14   AST 30   BILITOT 3.6*     Labs within the past 24 hours have been reviewed.    Diagnostic Results:  I have personally reviewed all pertinent imaging studies.

## 2017-10-28 NOTE — PROGRESS NOTES
[facility]  Liver Transplant  Progress Note    Patient Name: Jhonny Diana  MRN: [id number]  Admission Date: 2017  Hospital Length of Stay: 31 days  Code Status: Full Code  Primary Care Provider: Provider Notinsystem  Post-Operative Day: 9    ORGAN:   LIVER  Disease Etiology: Acute Alcoholic Hepatitis  Donor Type:    - Brain Death  CDC High Risk:   No  Donor CMV Status:   Donor CMV Status: Positive  Donor HBcAB:   Negative  Donor HCV Status:   Negative  Whole or Partial: Whole Liver  Biliary Anastomosis: End to End  Arterial Anatomy: Standard  Subjective:     History of Present Illness:  Jhonny Diana is a 26 yo male with hx of childhood asthma and heavy alcohol abuse (fifth of liquor daily since teenager, last drink 2.5 wks ago per mother) who was transferred to [facility] for higher level of care/liver transplant evaluation. Patient initially presented to OSH for a 2 mo hx of progressively worsening abdominal pain with associated fatigue, jaundice, and abdominal distention with ~20# weight gain.  Reports family hx of alcoholic cirrhosis in father. Pt was recently diagnosed with alcoholic hepatitis during his admission at OSH on -9/15. During that hospitalization, patient was given ceftriaxone for SBP ppx, prednisone for alcoholic hepatitis, and diuresed. Pt was discharged and instructed to follow up with PCP and AA within 1 wk. After his discharge, patient returned to ED 4 days after for increased weight gain and worsening abdominal pain/jaundice.     Mr. Diana was admitted to the CMICU on  for decompensated alcoholic hepatitis with high MELD score, severe HE, and LAURA. Hepatology consulted and followed to evaluate liver transplant candidacy. Pt started on albumin and lasix for LAURA and possibility for HRS. However, nephrology felt that LAURA is likely multifactorial: ATN given hypotensive episodes at OSH and toxic given severe hyperbilirubinemia; not truly HRS. Lactulose/rifaximin started  for HE and mental status slowly improved. Mr. Diana with significant ABD pain since admission with ABD distension. CT abdomen was significant for pericolonic fat stranding of right colon; no obstruction. Also with diffuse mesenteric edema. No significant ascites noted, no cholecystitis/cholelithiasis/ductal dilation. US liver with doppler showed appropriate vasculature.  He was stepped down to Hospital Medicine on 10/5.  Paracentesis was done on 10/9 with 3.3L removed and 10/12 with 1.8 removed, no infection. His diet was advanced and Dietary was consulted to help get adequate caloric intake. Listed for liver transplant 10/17 with MELD 36.    Pt received organ offer and underwent liver txp 10/19 which took place without complication.  Steroid induction used, CMV +/+.  Post operatively, LFTs/enzymes trended down nicely.  POD 5 U/S with elevated RIs and sluggish flow in HA system, otherwise ok.  Pt with LAURA prior to and s/p txp requiring dialysis.  He is tolerating HD with last HD 10/26.          Hospital Course:  Interval hx:  Abd pain improved with tylenol/tramadol.  Incision opened yesterday (R lateral 6 cm) with serosang output and packed with wet to dry.  Will change q 24 hours.  HD today.  Making more urine- push with lasix.    Scheduled Meds:   acetaminophen  500 mg Oral Q6H    albuterol-ipratropium 2.5mg-0.5mg/3mL  3 mL Nebulization Q6H WAKE    atovaquone  1,500 mg Oral Daily    bisacodyl  10 mg Oral Daily    docusate sodium  100 mg Oral TID PC    ergocalciferol  50,000 Units Oral Q7 Days    famotidine  20 mg Oral QHS    fluconazole  200 mg Oral Daily    furosemide  100 mg Intravenous Once    heparin (porcine)  5,000 Units Subcutaneous Q8H    mycophenolate  1,000 mg Oral BID    predniSONE  20 mg Oral Daily    tacrolimus  3 mg Oral BID    valganciclovir 50 mg/ml  100 mg Oral Every Mon, Wed, Fri     Continuous Infusions:   PRN Meds:sodium chloride, [START ON 10/29/2017] sodium chloride 0.9%,  dextrose 50%, dextrose 50%, glucagon (human recombinant), glucose, glucose, insulin aspart, simethicone, traMADol    Review of Systems   Constitutional: Positive for activity change, appetite change and fatigue. Negative for diaphoresis and fever.   HENT: Negative for mouth sores.    Respiratory: Negative for cough, choking, shortness of breath and wheezing.    Cardiovascular: Positive for leg swelling. Negative for chest pain and palpitations.   Gastrointestinal: Positive for abdominal distention. Negative for abdominal pain, constipation, diarrhea, nausea and vomiting.   Genitourinary: Positive for decreased urine volume. Negative for difficulty urinating.   Musculoskeletal: Negative for arthralgias, back pain and joint swelling.   Skin: Positive for wound.   Allergic/Immunologic: Positive for immunocompromised state.   Neurological: Negative for tremors, weakness and headaches.   Hematological: Bruises/bleeds easily.   Psychiatric/Behavioral: Positive for dysphoric mood. Negative for confusion, hallucinations and sleep disturbance. The patient is nervous/anxious.    All other systems reviewed and are negative.    Objective:     Vital Signs (Most Recent):  Temp: 98.2 °F (36.8 °C) (10/28/17 1222)  Pulse: 101 (10/28/17 1222)  Resp: 20 (10/28/17 1222)  BP: 118/62 (10/28/17 1222)  SpO2: 98 % (10/28/17 1222) Vital Signs (24h Range):  Temp:  [98.2 °F (36.8 °C)-98.5 °F (36.9 °C)] 98.2 °F (36.8 °C)  Pulse:  [] 101  Resp:  [16-20] 20  SpO2:  [97 %-100 %] 98 %  BP: (102-134)/(56-80) 118/62     Weight: 81.2 kg (179 lb 0.2 oz)  Body mass index is 28.04 kg/m².    Intake/Output - Last 3 Shifts       10/26 0700 - 10/27 0659 10/27 0700 - 10/28 0659 10/28 0700 - 10/29 0659    P.O. 1320 540 400    Blood 270      Other 800  800    Total Intake(mL/kg) 2390 (29.4) 540 (6.7) 1200 (14.8)    Urine (mL/kg/hr) 50 (0) 0 (0)     Other 3500 (1.8)  2600 (5.1)    Stool 0 (0) 0 (0) 0 (0)    Total Output 3550 0 2600    Net -1160 +540 -1400            Urine Occurrence 0 x 3 x     Stool Occurrence 0 x 0 x 0 x          Physical Exam   Constitutional: He is oriented to person, place, and time. He appears well-developed. No distress.   Temporal and distal extremity muscle wasting   HENT:   Head: Normocephalic and atraumatic.   Mouth/Throat: No oropharyngeal exudate.   Eyes: EOM are normal. Pupils are equal, round, and reactive to light. Scleral icterus is present.   Neck: Normal range of motion. Neck supple.   Cardiovascular: Normal rate, regular rhythm and normal heart sounds.  Exam reveals no gallop and no friction rub.    No murmur heard.  Pulmonary/Chest: Effort normal. No respiratory distress. He has no rales.   Dec R base   Abdominal: Soft. Bowel sounds are normal. He exhibits distension. There is no tenderness.   Chevron inc cdi no ssi   Musculoskeletal: Normal range of motion. He exhibits edema (1-2+ generalized).   Neurological: He is alert and oriented to person, place, and time.   Skin: Skin is warm and dry. He is not diaphoretic.   jaundiced   Psychiatric: He has a normal mood and affect. His behavior is normal. Judgment and thought content normal.   Nursing note and vitals reviewed.      Laboratory:  Immunosuppressants         Stop Route Frequency     tacrolimus capsule 3 mg      -- Oral 2 times daily     mycophenolate capsule 1,000 mg      -- Oral 2 times daily        CBC:     Recent Labs  Lab 10/28/17  0500   WBC 9.61   RBC 2.69*   HGB 8.3*   HCT 23.8*   PLT 94*   MCV 89   MCH 30.9   MCHC 34.9     CMP:     Recent Labs  Lab 10/28/17  0500   GLU 85   CALCIUM 7.4*   ALBUMIN 1.9*   PROT 3.9*   *   K 4.0   CO2 23   CL 99   BUN 43*   CREATININE 3.9*   ALKPHOS 174*   ALT 14   AST 30   BILITOT 3.6*     Labs within the past 24 hours have been reviewed.    Diagnostic Results:  I have personally reviewed all pertinent imaging studies.    Assessment/Plan:     * Liver transplant 10/19/2017 for acute alcoholic hepatitis    - LFTs/enzymes trended down  nicely  - Bowel regimen started today, other than full return of bowels has met all post surgical marks  - Transitioned pain control to scheduled acetaminophen + Tramadol as hallucinations with oxycodone- tolerating well.  - Liver US POD 5 with elevated RIs and slowed flow HA system- will monitor labs and repeat prior to discharge.            LUARA (acute kidney injury)    - Pt reports UOP last night and this AM  - Lasix 100 mg x 1 today  - Nephrology following with us.  - HD last 10/26- tolerated well.  - Plan for permcath placement Monday if without continued improvement in prep for discharge        Long-term use of immunosuppressant medication    - See prophylactic immunotherapy          Prophylactic immunotherapy    - Continue tacrolimus, prednisone taper, Cellcept.  - Will monitor for signs of toxic side effects, check daily tacrolimus troughs, and change meds accordingly.        Acute blood loss anemia    - PRBC x 2 u 10/26 and responded nicely  - Monitor        Anasarca    - 2/2 liver disease, LAURA, and hypoalbuminemia  - Expect improvement once kidneys improve.    - Attempt to keep dressings dry        At risk for opportunistic infections    - D/C Dapsone secondary to worsening anemia and start atovaquone for PCP prophylaxis  - Continue Valcyte for CMV prophylaxis  - Continue fluconazole for fungal prophylaxis          Severe malnutrition    - Dietary consulted and following.   - Continue protein powder        Alcoholism /alcohol abuse    - Psych consulted, high risk for transplant. To complete ABU program s/p transplant          Physical deconditioning    - Working with PT/OT  - Monitor for outpt needs              VTE Risk Mitigation         Ordered     heparin (porcine) injection 5,000 Units  Every 8 hours     Route:  Subcutaneous        10/20/17 0346     Place sequential compression device  Until discontinued      10/20/17 0346     High Risk of VTE  Once      10/20/17 0346          The patients clinical  status was discussed at multidisplinary rounds, involving transplant surgery, transplant medicine, pharmacy, nursing, nutrition, and social work    Discharge Planning:  Monitor kidney function --> may need outpt HD  Monitor HH needs vs rehab for deconditioned status      Geronimo Vera PA-C  Liver Transplant  [facility]

## 2017-10-28 NOTE — PT/OT/SLP PROGRESS
Occupational Therapy      Jhonny Diana  MRN: [id number]    Patient not seen today secondary to Dialysis. Will follow-up at next scheduled visit.    JULIETTE Cooper  10/28/2017

## 2017-10-28 NOTE — PLAN OF CARE
Problem: Patient Care Overview  Goal: Plan of Care Review  Outcome: Ongoing (interventions implemented as appropriate)  Patient's VSS for shift. Patient is AAOx4, calm, cooperative, and pleasant. Patient can ambulate and turn on his own. He is very deconditioned so he does still prefer to hold a hand on each side. Patient went to dialysis and had 2 liters removed. Patient tolerated procedure well. Patient did complain of pain to right side of incision. Is allowed oxy 5 mg every 6 if tramadol does not control pain. Site is to be cleansed daily with normal saline with a wet to dry dressing and an abd pad on top. Patient has approximately 2.5 inches of wound where PA has removed staples. Considering wound vac for site. Changed dressing twice today due to saturation. Groin dressing still on but saturated. Changed and still dry at shift changed. Central line dressing changed as it was coming off. Appetite is very good. Free from falls, injury, and skin breakdown.

## 2017-10-28 NOTE — PLAN OF CARE
Problem: Patient Care Overview  Goal: Plan of Care Review  Outcome: Outcome(s) achieved Date Met: 10/28/17  Dialysis completed. Right IJ trialysis catheter flushed with normal saline, capped and taped. Patient dialyzed for 3 hours with fluid removal of 2 liters. Tolerated well with stable vital signs. Report called to floor to Rizwana Gan.

## 2017-10-28 NOTE — PROGRESS NOTES
HEMODIALYSIS NOTE  Patient evaluated while undergoing hemodialysis indicated for unresolved LAURA. Tolerating session with current UFR, no complications.

## 2017-10-29 LAB
ALBUMIN SERPL BCP-MCNC: 2 G/DL
ALP SERPL-CCNC: 168 U/L
ALT SERPL W/O P-5'-P-CCNC: 11 U/L
ANION GAP SERPL CALC-SCNC: 9 MMOL/L
AST SERPL-CCNC: 22 U/L
BASOPHILS # BLD AUTO: 0.01 K/UL
BASOPHILS NFR BLD: 0.1 %
BILIRUB SERPL-MCNC: 3.8 MG/DL
BUN SERPL-MCNC: 31 MG/DL
CALCIUM SERPL-MCNC: 7.5 MG/DL
CHLORIDE SERPL-SCNC: 101 MMOL/L
CO2 SERPL-SCNC: 22 MMOL/L
CREAT SERPL-MCNC: 2.9 MG/DL
DIFFERENTIAL METHOD: ABNORMAL
EOSINOPHIL # BLD AUTO: 0.1 K/UL
EOSINOPHIL NFR BLD: 0.6 %
ERYTHROCYTE [DISTWIDTH] IN BLOOD BY AUTOMATED COUNT: 18.7 %
EST. GFR  (AFRICAN AMERICAN): 32.5 ML/MIN/1.73 M^2
EST. GFR  (NON AFRICAN AMERICAN): 28.1 ML/MIN/1.73 M^2
GLUCOSE SERPL-MCNC: 71 MG/DL
HCT VFR BLD AUTO: 24.5 %
HGB BLD-MCNC: 8.5 G/DL
IMM GRANULOCYTES # BLD AUTO: 0.17 K/UL
IMM GRANULOCYTES NFR BLD AUTO: 1.8 %
LYMPHOCYTES # BLD AUTO: 1 K/UL
LYMPHOCYTES NFR BLD: 10.8 %
MAGNESIUM SERPL-MCNC: 1.5 MG/DL
MCH RBC QN AUTO: 30.6 PG
MCHC RBC AUTO-ENTMCNC: 34.7 G/DL
MCV RBC AUTO: 88 FL
MONOCYTES # BLD AUTO: 0.8 K/UL
MONOCYTES NFR BLD: 7.9 %
NEUTROPHILS # BLD AUTO: 7.6 K/UL
NEUTROPHILS NFR BLD: 78.8 %
NRBC BLD-RTO: 0 /100 WBC
PHOSPHATE SERPL-MCNC: 3 MG/DL
PLATELET # BLD AUTO: 92 K/UL
PMV BLD AUTO: 11.3 FL
POTASSIUM SERPL-SCNC: 3.8 MMOL/L
PROT SERPL-MCNC: 4.2 G/DL
RBC # BLD AUTO: 2.78 M/UL
SODIUM SERPL-SCNC: 132 MMOL/L
TACROLIMUS BLD-MCNC: 8.8 NG/ML
WBC # BLD AUTO: 9.61 K/UL

## 2017-10-29 PROCEDURE — 97110 THERAPEUTIC EXERCISES: CPT

## 2017-10-29 PROCEDURE — 25000003 PHARM REV CODE 250: Performed by: INTERNAL MEDICINE

## 2017-10-29 PROCEDURE — 94799 UNLISTED PULMONARY SVC/PX: CPT

## 2017-10-29 PROCEDURE — 25000242 PHARM REV CODE 250 ALT 637 W/ HCPCS: Performed by: STUDENT IN AN ORGANIZED HEALTH CARE EDUCATION/TRAINING PROGRAM

## 2017-10-29 PROCEDURE — 97535 SELF CARE MNGMENT TRAINING: CPT

## 2017-10-29 PROCEDURE — 25000003 PHARM REV CODE 250: Performed by: PHYSICIAN ASSISTANT

## 2017-10-29 PROCEDURE — 25000003 PHARM REV CODE 250: Performed by: STUDENT IN AN ORGANIZED HEALTH CARE EDUCATION/TRAINING PROGRAM

## 2017-10-29 PROCEDURE — 80197 ASSAY OF TACROLIMUS: CPT

## 2017-10-29 PROCEDURE — 63600175 PHARM REV CODE 636 W HCPCS: Performed by: TRANSPLANT SURGERY

## 2017-10-29 PROCEDURE — 80053 COMPREHEN METABOLIC PANEL: CPT

## 2017-10-29 PROCEDURE — 85025 COMPLETE CBC W/AUTO DIFF WBC: CPT

## 2017-10-29 PROCEDURE — 84100 ASSAY OF PHOSPHORUS: CPT

## 2017-10-29 PROCEDURE — 97530 THERAPEUTIC ACTIVITIES: CPT

## 2017-10-29 PROCEDURE — 25000003 PHARM REV CODE 250: Performed by: TRANSPLANT SURGERY

## 2017-10-29 PROCEDURE — 99233 SBSQ HOSP IP/OBS HIGH 50: CPT | Mod: 24,,, | Performed by: PHYSICIAN ASSISTANT

## 2017-10-29 PROCEDURE — 63600175 PHARM REV CODE 636 W HCPCS: Performed by: PHYSICIAN ASSISTANT

## 2017-10-29 PROCEDURE — 94761 N-INVAS EAR/PLS OXIMETRY MLT: CPT

## 2017-10-29 PROCEDURE — 94640 AIRWAY INHALATION TREATMENT: CPT

## 2017-10-29 PROCEDURE — 83735 ASSAY OF MAGNESIUM: CPT

## 2017-10-29 PROCEDURE — 63600175 PHARM REV CODE 636 W HCPCS: Performed by: STUDENT IN AN ORGANIZED HEALTH CARE EDUCATION/TRAINING PROGRAM

## 2017-10-29 PROCEDURE — P9047 ALBUMIN (HUMAN), 25%, 50ML: HCPCS | Performed by: PHYSICIAN ASSISTANT

## 2017-10-29 PROCEDURE — 20600001 HC STEP DOWN PRIVATE ROOM

## 2017-10-29 RX ORDER — HYDROMORPHONE HYDROCHLORIDE 1 MG/ML
0.2 INJECTION, SOLUTION INTRAMUSCULAR; INTRAVENOUS; SUBCUTANEOUS ONCE
Status: DISCONTINUED | OUTPATIENT
Start: 2017-10-29 | End: 2017-11-04

## 2017-10-29 RX ORDER — BISACODYL 10 MG
10 SUPPOSITORY, RECTAL RECTAL ONCE
Status: DISCONTINUED | OUTPATIENT
Start: 2017-10-29 | End: 2017-10-30

## 2017-10-29 RX ORDER — FUROSEMIDE 10 MG/ML
100 INJECTION INTRAMUSCULAR; INTRAVENOUS ONCE
Status: COMPLETED | OUTPATIENT
Start: 2017-10-29 | End: 2017-10-29

## 2017-10-29 RX ORDER — ALBUMIN HUMAN 250 G/1000ML
25 SOLUTION INTRAVENOUS EVERY 6 HOURS
Status: COMPLETED | OUTPATIENT
Start: 2017-10-29 | End: 2017-10-29

## 2017-10-29 RX ADMIN — IPRATROPIUM BROMIDE AND ALBUTEROL SULFATE 3 ML: .5; 3 SOLUTION RESPIRATORY (INHALATION) at 07:10

## 2017-10-29 RX ADMIN — FLUCONAZOLE 200 MG: 200 TABLET ORAL at 08:10

## 2017-10-29 RX ADMIN — MYCOPHENOLATE MOFETIL 1000 MG: 250 CAPSULE ORAL at 09:10

## 2017-10-29 RX ADMIN — OXYCODONE HYDROCHLORIDE 5 MG: 5 TABLET ORAL at 11:10

## 2017-10-29 RX ADMIN — BISACODYL 10 MG: 5 TABLET, COATED ORAL at 08:10

## 2017-10-29 RX ADMIN — IPRATROPIUM BROMIDE AND ALBUTEROL SULFATE 3 ML: .5; 3 SOLUTION RESPIRATORY (INHALATION) at 01:10

## 2017-10-29 RX ADMIN — ACETAMINOPHEN 500 MG: 500 TABLET ORAL at 06:10

## 2017-10-29 RX ADMIN — TACROLIMUS 3 MG: 1 CAPSULE ORAL at 08:10

## 2017-10-29 RX ADMIN — HEPARIN SODIUM 5000 UNITS: 5000 INJECTION, SOLUTION INTRAVENOUS; SUBCUTANEOUS at 09:10

## 2017-10-29 RX ADMIN — DOCUSATE SODIUM 100 MG: 100 CAPSULE, LIQUID FILLED ORAL at 08:10

## 2017-10-29 RX ADMIN — DOCUSATE SODIUM 100 MG: 100 CAPSULE, LIQUID FILLED ORAL at 06:10

## 2017-10-29 RX ADMIN — SIMETHICONE CHEW TAB 80 MG 80 MG: 80 TABLET ORAL at 03:10

## 2017-10-29 RX ADMIN — TRAMADOL HYDROCHLORIDE 50 MG: 50 TABLET, COATED ORAL at 06:10

## 2017-10-29 RX ADMIN — PREDNISONE 20 MG: 10 TABLET ORAL at 08:10

## 2017-10-29 RX ADMIN — FAMOTIDINE 20 MG: 20 TABLET, FILM COATED ORAL at 09:10

## 2017-10-29 RX ADMIN — ATOVAQUONE 1500 MG: 750 SUSPENSION ORAL at 08:10

## 2017-10-29 RX ADMIN — SIMETHICONE CHEW TAB 80 MG 80 MG: 80 TABLET ORAL at 09:10

## 2017-10-29 RX ADMIN — ACETAMINOPHEN 500 MG: 500 TABLET ORAL at 11:10

## 2017-10-29 RX ADMIN — FUROSEMIDE 100 MG: 10 INJECTION, SOLUTION INTRAVENOUS at 09:10

## 2017-10-29 RX ADMIN — HEPARIN SODIUM 5000 UNITS: 5000 INJECTION, SOLUTION INTRAVENOUS; SUBCUTANEOUS at 02:10

## 2017-10-29 RX ADMIN — TACROLIMUS 3 MG: 1 CAPSULE ORAL at 06:10

## 2017-10-29 RX ADMIN — ALBUMIN (HUMAN) 25 G: 12.5 SOLUTION INTRAVENOUS at 11:10

## 2017-10-29 RX ADMIN — ALBUMIN (HUMAN) 25 G: 12.5 SOLUTION INTRAVENOUS at 08:10

## 2017-10-29 RX ADMIN — DOCUSATE SODIUM 100 MG: 100 CAPSULE, LIQUID FILLED ORAL at 02:10

## 2017-10-29 RX ADMIN — TRAMADOL HYDROCHLORIDE 50 MG: 50 TABLET, COATED ORAL at 03:10

## 2017-10-29 NOTE — PLAN OF CARE
Problem: Patient Care Overview  Goal: Plan of Care Review  Outcome: Ongoing (interventions implemented as appropriate)  Plan of care reviewed with patient. Pt verbalized understanding. Pt AAOX4. Pt free from falls, injuries and trauma.  Pt free from skin breakdown.  Pt VSS and in NAD.  Pt afebrile.  Pt had no complaints of SOB, N/V or CP.  Pts pain moderately controlled with prn pain medications. Moderate UOP this shift. No BM this shift. Incision continues to ooze serous and purulent drainage with foul odor. [employer] dsg changed twice.  Self meds 100% by mother.  Pt had uneventful evening. Pt in low locked bed with personal items and call light within reach. Fall precautions maintained. Mother remained at bedside

## 2017-10-29 NOTE — PLAN OF CARE
Problem: Occupational Therapy Goal  Goal: Occupational Therapy Goal  Goals to be met by:  11/10/17    Patient will increase functional independence with ADLs by performing:    UE Dressing with Minimal Assistance. MET (10/29/2017)  LE Dressing with Moderate Assistance with AD as needed.  Grooming while standing at sink with Minimal Assistance.  Toileting from toilet with Minimal Assistance for hygiene and clothing management.   Supine to sit with Moderate Assistance.  Toilet transfer to bedside commode with Minimal Assistance.  Upper extremity exercise program per handout, with independence.                 Outcome: Ongoing (interventions implemented as appropriate)  Goals revised and updated. On-going goals remain appropriate    Comments: Cont OT POC

## 2017-10-29 NOTE — PT/OT/SLP PROGRESS
Occupational Therapy  Treatment    Jhonny Diana   MRN: [id number]   Admitting Diagnosis: Liver replaced by transplant    OT Date of Treatment: 10/29/17   OT Start Time: 1400  OT Stop Time: 1432  OT Total Time (min): 32 min    Billable Minutes:  Therapeutic Activity 16 and Therapeutic Exercise 16    General Precautions: Standard, fall  Orthopedic Precautions: N/A  Braces: N/A       Subjective:  Communicated with RN prior to session.  Pt agreeable to OT session  Pain/Comfort  Pain Rating 1: 4/10  Location - Side 1: Bilateral  Location - Orientation 1: lower  Location 1: abdomen  Pain Addressed 1: Distraction, Reposition  Pain Rating Post-Intervention 1: 4/10    Objective:  Patient found with: telemetry, wound vac     Functional Mobility:  Bed Mobility: Pt found UIC       Transfers:   Sit <> Stand Assistance: Moderate Assistance  Sit <> Stand Assistive Device: Rolling Walker  Bed <> Chair Technique: Stand Pivot  Bed <> Chair Transfer Assistance: Contact Guard Assistance  Bed <> Chair Assistive Device: Rolling Walker    Functional Ambulation: Pt ambulated CGA and RW household distance and into hallway with one rest break in standing.     Activities of Daily Living:  LE Dressing Level of Assistance: Total assistance         Balance:   Static Sit: GOOD: Takes MODERATE challenges from all directions  Dynamic Sit: GOOD: Maintains balance through MODERATE excursions of active trunk movement  Static Stand: FAIR+: Takes MINIMAL challenges from all directions  Dynamic stand: FAIR: Needs CONTACT GUARD during gait    Therapeutic Activities and Exercises:  Pt educated on OT POC; he tolerated session well; ambulated into hallway with no LOB but one required rest break. Pt required TOTAL A for LB dressing due to abdominal pain and limited ability to lean/bend forward 2/2 incision. Pt completed BUE exercises w/ resistive material- elbow flex/ext and shoulder abd - 1x12 each; all exercises performed in standing to maximize activity  "tolerance and increase endurance. OT currently recommending rehab upon d/c but pending progress.    AM-PAC 6 CLICK ADL   How much help from another person does this patient currently need?   1 = Unable, Total/Dependent Assistance  2 = A lot, Maximum/Moderate Assistance  3 = A little, Minimum/Contact Guard/Supervision  4 = None, Modified [place]/Independent    Putting on and taking off regular lower body clothing? : 1  Bathing (including washing, rinsing, drying)?: 1  Toileting, which includes using toilet, bedpan, or urinal? : 2  Putting on and taking off regular upper body clothing?: 3  Taking care of personal grooming such as brushing teeth?: 3  Eating meals?: 4  Total Score: 14     AM-PAC Raw Score CMS "G-Code Modifier Level of Impairment Assistance   6 % Total / Unable   7 - 8 CM 80 - 100% Maximal Assist   9-13 CL 60 - 80% Moderate Assist   14 - 19 CK 40 - 60% Moderate Assist   20 - 22 CJ 20 - 40% Minimal Assist   23 CI 1-20% SBA / CGA   24 CH 0% Independent/ Mod I       Patient left up in chair with all lines intact, call button in reach and RN notified    ASSESSMENT:  Jhonny Diana is a 27 y.o. male with a medical diagnosis of Liver replaced by transplant and presents with improved functional mobility and increased activity tolerance. Pt tolerated BUE exercises well; performed 1x12 each to increase BUE strength to facilitate functional transfers in preparation for ADLs. Pt continues to benefit from OT services to address the below listed impairments.     Rehab identified problem list/impairments: Rehab identified problem list/impairments: weakness, impaired endurance, impaired functional mobilty, impaired self care skills, edema, pain    Rehab potential is good.    Activity tolerance: Good    Discharge recommendations: Discharge Facility/Level Of Care Needs: rehabilitation facility (pending progress )     Barriers to discharge: Barriers to Discharge:  (increased level of assistance at this time but " may progress quickly )    Equipment recommendations:  (TBD pending progress)     GOALS:    Occupational Therapy Goals        Problem: Occupational Therapy Goal    Goal Priority Disciplines Outcome Interventions   Occupational Therapy Goal     OT, PT/OT Ongoing (interventions implemented as appropriate)    Description:  Goals to be met by:  11/10/17    Patient will increase functional independence with ADLs by performing:    UE Dressing with Minimal Assistance. MET (10/29/2017)  LE Dressing with Moderate Assistance with AD as needed.  Grooming while standing at sink with Minimal Assistance.  Toileting from toilet with Minimal Assistance for hygiene and clothing management.   Supine to sit with Moderate Assistance.  Toilet transfer to bedside commode with Minimal Assistance.  Upper extremity exercise program per handout, with independence.                                   Plan:  Patient to be seen 5 x/week to address the above listed problems via self-care/home management, therapeutic activities, therapeutic exercises  Plan of Care expires: 11/26/17  Plan of Care reviewed with: patient         Anne Pérez, OT  10/29/2017

## 2017-10-30 LAB
ABO + RH BLD: NORMAL
ALBUMIN SERPL BCP-MCNC: 2.5 G/DL
ALP SERPL-CCNC: 175 U/L
ALT SERPL W/O P-5'-P-CCNC: 8 U/L
ANION GAP SERPL CALC-SCNC: 13 MMOL/L
AST SERPL-CCNC: 20 U/L
BASOPHILS # BLD AUTO: 0.02 K/UL
BASOPHILS NFR BLD: 0.2 %
BILIRUB SERPL-MCNC: 3.3 MG/DL
BLD GP AB SCN CELLS X3 SERPL QL: NORMAL
BUN SERPL-MCNC: 41 MG/DL
CALCIUM SERPL-MCNC: 8.2 MG/DL
CHLORIDE SERPL-SCNC: 100 MMOL/L
CO2 SERPL-SCNC: 19 MMOL/L
CREAT SERPL-MCNC: 3.9 MG/DL
DIFFERENTIAL METHOD: ABNORMAL
EOSINOPHIL # BLD AUTO: 0.1 K/UL
EOSINOPHIL NFR BLD: 0.6 %
ERYTHROCYTE [DISTWIDTH] IN BLOOD BY AUTOMATED COUNT: 19.5 %
EST. GFR  (AFRICAN AMERICAN): 22.7 ML/MIN/1.73 M^2
EST. GFR  (NON AFRICAN AMERICAN): 19.7 ML/MIN/1.73 M^2
GLUCOSE SERPL-MCNC: 70 MG/DL
HBV SURFACE AB SER-ACNC: POSITIVE M[IU]/ML
HBV SURFACE AG SERPL QL IA: NEGATIVE
HCT VFR BLD AUTO: 25.7 %
HGB BLD-MCNC: 9.1 G/DL
IMM GRANULOCYTES # BLD AUTO: 0.1 K/UL
IMM GRANULOCYTES NFR BLD AUTO: 1 %
LYMPHOCYTES # BLD AUTO: 1.4 K/UL
LYMPHOCYTES NFR BLD: 14 %
MAGNESIUM SERPL-MCNC: 1.5 MG/DL
MCH RBC QN AUTO: 31.9 PG
MCHC RBC AUTO-ENTMCNC: 35.4 G/DL
MCV RBC AUTO: 90 FL
MONOCYTES # BLD AUTO: 0.6 K/UL
MONOCYTES NFR BLD: 6.3 %
NEUTROPHILS # BLD AUTO: 7.8 K/UL
NEUTROPHILS NFR BLD: 77.9 %
NRBC BLD-RTO: 0 /100 WBC
PHOSPHATE SERPL-MCNC: 3.2 MG/DL
PLATELET # BLD AUTO: 120 K/UL
PMV BLD AUTO: 11.2 FL
POTASSIUM SERPL-SCNC: 3.6 MMOL/L
PROT SERPL-MCNC: 4.8 G/DL
RBC # BLD AUTO: 2.85 M/UL
SODIUM SERPL-SCNC: 132 MMOL/L
TACROLIMUS BLD-MCNC: 11.5 NG/ML
WBC # BLD AUTO: 9.97 K/UL

## 2017-10-30 PROCEDURE — 97530 THERAPEUTIC ACTIVITIES: CPT

## 2017-10-30 PROCEDURE — 25000242 PHARM REV CODE 250 ALT 637 W/ HCPCS: Performed by: STUDENT IN AN ORGANIZED HEALTH CARE EDUCATION/TRAINING PROGRAM

## 2017-10-30 PROCEDURE — 80053 COMPREHEN METABOLIC PANEL: CPT

## 2017-10-30 PROCEDURE — 77001 FLUOROGUIDE FOR VEIN DEVICE: CPT | Mod: 26,,, | Performed by: INTERNAL MEDICINE

## 2017-10-30 PROCEDURE — 63600175 PHARM REV CODE 636 W HCPCS: Performed by: INTERNAL MEDICINE

## 2017-10-30 PROCEDURE — 99233 SBSQ HOSP IP/OBS HIGH 50: CPT | Mod: 24,,, | Performed by: PHYSICIAN ASSISTANT

## 2017-10-30 PROCEDURE — 25000003 PHARM REV CODE 250

## 2017-10-30 PROCEDURE — 36558 INSERT TUNNELED CV CATH: CPT | Mod: ,,, | Performed by: INTERNAL MEDICINE

## 2017-10-30 PROCEDURE — P9047 ALBUMIN (HUMAN), 25%, 50ML: HCPCS | Performed by: PHYSICIAN ASSISTANT

## 2017-10-30 PROCEDURE — 99232 SBSQ HOSP IP/OBS MODERATE 35: CPT | Mod: ,,, | Performed by: INTERNAL MEDICINE

## 2017-10-30 PROCEDURE — 90935 HEMODIALYSIS ONE EVALUATION: CPT

## 2017-10-30 PROCEDURE — 76937 US GUIDE VASCULAR ACCESS: CPT

## 2017-10-30 PROCEDURE — 25000003 PHARM REV CODE 250: Performed by: INTERNAL MEDICINE

## 2017-10-30 PROCEDURE — 77001 FLUOROGUIDE FOR VEIN DEVICE: CPT

## 2017-10-30 PROCEDURE — 86850 RBC ANTIBODY SCREEN: CPT

## 2017-10-30 PROCEDURE — 80197 ASSAY OF TACROLIMUS: CPT

## 2017-10-30 PROCEDURE — P9047 ALBUMIN (HUMAN), 25%, 50ML: HCPCS

## 2017-10-30 PROCEDURE — 85025 COMPLETE CBC W/AUTO DIFF WBC: CPT

## 2017-10-30 PROCEDURE — 94761 N-INVAS EAR/PLS OXIMETRY MLT: CPT

## 2017-10-30 PROCEDURE — 20600001 HC STEP DOWN PRIVATE ROOM

## 2017-10-30 PROCEDURE — 02H633Z INSERTION OF INFUSION DEVICE INTO RIGHT ATRIUM, PERCUTANEOUS APPROACH: ICD-10-PCS | Performed by: INTERNAL MEDICINE

## 2017-10-30 PROCEDURE — 84100 ASSAY OF PHOSPHORUS: CPT

## 2017-10-30 PROCEDURE — 86900 BLOOD TYPING SEROLOGIC ABO: CPT

## 2017-10-30 PROCEDURE — 25000003 PHARM REV CODE 250: Performed by: TRANSPLANT SURGERY

## 2017-10-30 PROCEDURE — 76937 US GUIDE VASCULAR ACCESS: CPT | Mod: 26,,, | Performed by: INTERNAL MEDICINE

## 2017-10-30 PROCEDURE — 63600175 PHARM REV CODE 636 W HCPCS: Performed by: PHYSICIAN ASSISTANT

## 2017-10-30 PROCEDURE — 94640 AIRWAY INHALATION TREATMENT: CPT

## 2017-10-30 PROCEDURE — 97110 THERAPEUTIC EXERCISES: CPT

## 2017-10-30 PROCEDURE — 63600175 PHARM REV CODE 636 W HCPCS

## 2017-10-30 PROCEDURE — 25000003 PHARM REV CODE 250: Performed by: PHYSICIAN ASSISTANT

## 2017-10-30 PROCEDURE — 63600175 PHARM REV CODE 636 W HCPCS: Performed by: STUDENT IN AN ORGANIZED HEALTH CARE EDUCATION/TRAINING PROGRAM

## 2017-10-30 PROCEDURE — 63600175 PHARM REV CODE 636 W HCPCS: Performed by: NURSE PRACTITIONER

## 2017-10-30 PROCEDURE — 63600175 PHARM REV CODE 636 W HCPCS: Performed by: TRANSPLANT SURGERY

## 2017-10-30 PROCEDURE — 83735 ASSAY OF MAGNESIUM: CPT

## 2017-10-30 PROCEDURE — 25000003 PHARM REV CODE 250: Performed by: STUDENT IN AN ORGANIZED HEALTH CARE EDUCATION/TRAINING PROGRAM

## 2017-10-30 RX ORDER — ALBUMIN HUMAN 250 G/1000ML
25 SOLUTION INTRAVENOUS EVERY 6 HOURS
Status: COMPLETED | OUTPATIENT
Start: 2017-10-30 | End: 2017-10-30

## 2017-10-30 RX ORDER — LORAZEPAM/0.9% SODIUM CHLORIDE 100MG/0.1L
2 PLASTIC BAG, INJECTION (ML) INTRAVENOUS
Status: COMPLETED | OUTPATIENT
Start: 2017-10-30 | End: 2017-10-30

## 2017-10-30 RX ORDER — SODIUM CHLORIDE 9 MG/ML
INJECTION, SOLUTION INTRAVENOUS
Status: DISCONTINUED | OUTPATIENT
Start: 2017-10-30 | End: 2017-11-01

## 2017-10-30 RX ORDER — TACROLIMUS 1 MG/1
2 CAPSULE ORAL EVERY EVENING
Status: DISCONTINUED | OUTPATIENT
Start: 2017-10-30 | End: 2017-10-31

## 2017-10-30 RX ORDER — TACROLIMUS 1 MG/1
3 CAPSULE ORAL EVERY MORNING
Status: DISCONTINUED | OUTPATIENT
Start: 2017-10-31 | End: 2017-10-31

## 2017-10-30 RX ORDER — SODIUM BICARBONATE 650 MG/1
1300 TABLET ORAL 2 TIMES DAILY
Status: DISCONTINUED | OUTPATIENT
Start: 2017-10-30 | End: 2017-11-06

## 2017-10-30 RX ORDER — HEPARIN SODIUM 1000 [USP'U]/ML
1000 INJECTION, SOLUTION INTRAVENOUS; SUBCUTANEOUS
Status: DISCONTINUED | OUTPATIENT
Start: 2017-10-30 | End: 2017-11-08 | Stop reason: HOSPADM

## 2017-10-30 RX ORDER — BISACODYL 10 MG
10 SUPPOSITORY, RECTAL RECTAL ONCE
Status: DISCONTINUED | OUTPATIENT
Start: 2017-10-30 | End: 2017-11-04

## 2017-10-30 RX ORDER — HEPARIN SODIUM 5000 [USP'U]/ML
5000 INJECTION, SOLUTION INTRAVENOUS; SUBCUTANEOUS EVERY 8 HOURS
Status: DISCONTINUED | OUTPATIENT
Start: 2017-10-30 | End: 2017-11-08 | Stop reason: HOSPADM

## 2017-10-30 RX ORDER — ALBUMIN HUMAN 250 G/1000ML
SOLUTION INTRAVENOUS
Status: COMPLETED
Start: 2017-10-30 | End: 2017-10-30

## 2017-10-30 RX ORDER — POLYETHYLENE GLYCOL 3350 17 G/17G
17 POWDER, FOR SOLUTION ORAL DAILY
Status: DISCONTINUED | OUTPATIENT
Start: 2017-10-30 | End: 2017-11-08 | Stop reason: HOSPADM

## 2017-10-30 RX ADMIN — TRAMADOL HYDROCHLORIDE 50 MG: 50 TABLET, COATED ORAL at 04:10

## 2017-10-30 RX ADMIN — FLUCONAZOLE 200 MG: 200 TABLET ORAL at 08:10

## 2017-10-30 RX ADMIN — PREDNISONE 20 MG: 10 TABLET ORAL at 08:10

## 2017-10-30 RX ADMIN — MYCOPHENOLATE MOFETIL 1000 MG: 250 CAPSULE ORAL at 09:10

## 2017-10-30 RX ADMIN — SODIUM CHLORIDE 300 ML: 0.9 INJECTION, SOLUTION INTRAVENOUS at 04:10

## 2017-10-30 RX ADMIN — MAGNESIUM SULFATE IN WATER 2 G: 40 INJECTION, SOLUTION INTRAVENOUS at 12:10

## 2017-10-30 RX ADMIN — ACETAMINOPHEN 500 MG: 500 TABLET ORAL at 11:10

## 2017-10-30 RX ADMIN — TACROLIMUS 3 MG: 1 CAPSULE ORAL at 08:10

## 2017-10-30 RX ADMIN — ACETAMINOPHEN 500 MG: 500 TABLET ORAL at 12:10

## 2017-10-30 RX ADMIN — POLYETHYLENE GLYCOL 3350 17 G: 17 POWDER, FOR SOLUTION ORAL at 12:10

## 2017-10-30 RX ADMIN — OXYCODONE HYDROCHLORIDE 5 MG: 5 TABLET ORAL at 09:10

## 2017-10-30 RX ADMIN — HEPARIN SODIUM 1000 UNITS: 1000 INJECTION, SOLUTION INTRAVENOUS; SUBCUTANEOUS at 06:10

## 2017-10-30 RX ADMIN — ALBUMIN (HUMAN) 25 G: 12.5 SOLUTION INTRAVENOUS at 09:10

## 2017-10-30 RX ADMIN — ATOVAQUONE 1500 MG: 750 SUSPENSION ORAL at 09:10

## 2017-10-30 RX ADMIN — SODIUM BICARBONATE 650 MG TABLET 1300 MG: at 08:10

## 2017-10-30 RX ADMIN — DOCUSATE SODIUM 100 MG: 100 CAPSULE, LIQUID FILLED ORAL at 09:10

## 2017-10-30 RX ADMIN — BISACODYL 10 MG: 5 TABLET, COATED ORAL at 08:10

## 2017-10-30 RX ADMIN — TACROLIMUS 2 MG: 1 CAPSULE ORAL at 07:10

## 2017-10-30 RX ADMIN — ALBUMIN (HUMAN) 25 G: 12.5 SOLUTION INTRAVENOUS at 08:10

## 2017-10-30 RX ADMIN — FAMOTIDINE 20 MG: 20 TABLET, FILM COATED ORAL at 09:10

## 2017-10-30 RX ADMIN — IPRATROPIUM BROMIDE AND ALBUTEROL SULFATE 3 ML: .5; 3 SOLUTION RESPIRATORY (INHALATION) at 08:10

## 2017-10-30 RX ADMIN — ACETAMINOPHEN 500 MG: 500 TABLET ORAL at 04:10

## 2017-10-30 RX ADMIN — HEPARIN SODIUM 5000 UNITS: 5000 INJECTION, SOLUTION INTRAVENOUS; SUBCUTANEOUS at 09:10

## 2017-10-30 RX ADMIN — SIMETHICONE CHEW TAB 80 MG 80 MG: 80 TABLET ORAL at 08:10

## 2017-10-30 RX ADMIN — SODIUM BICARBONATE 650 MG TABLET 1300 MG: at 09:10

## 2017-10-30 RX ADMIN — MORPHINE 100 MG: 10 SOLUTION ORAL at 09:10

## 2017-10-30 RX ADMIN — ACETAMINOPHEN 500 MG: 500 TABLET ORAL at 07:10

## 2017-10-30 RX ADMIN — MAGNESIUM SULFATE IN WATER 2 G: 40 INJECTION, SOLUTION INTRAVENOUS at 02:10

## 2017-10-30 NOTE — PROGRESS NOTES
[facility]  Liver Transplant  Progress Note    Patient Name: Jhonny Diana  MRN: [id number]  Admission Date: 2017  Hospital Length of Stay: 32 days  Code Status: Full Code  Primary Care Provider: Provider Notinsystem  Post-Operative Day: 10    ORGAN:   LIVER  Disease Etiology: Acute Alcoholic Hepatitis  Donor Type:    - Brain Death  CDC High Risk:   No  Donor CMV Status:   Donor CMV Status: Positive  Donor HBcAB:   Negative  Donor HCV Status:   Negative  Whole or Partial: Whole Liver  Biliary Anastomosis: End to End  Arterial Anatomy: Standard  Subjective:     History of Present Illness:  Jhonny Diana is a 26 yo male with hx of childhood asthma and heavy alcohol abuse (fifth of liquor daily since teenager, last drink 2.5 wks ago per mother) who was transferred to [facility] for higher level of care/liver transplant evaluation. Patient initially presented to OSH for a 2 mo hx of progressively worsening abdominal pain with associated fatigue, jaundice, and abdominal distention with ~20# weight gain.  Reports family hx of alcoholic cirrhosis in father. Pt was recently diagnosed with alcoholic hepatitis during his admission at OSH on -9/15. During that hospitalization, patient was given ceftriaxone for SBP ppx, prednisone for alcoholic hepatitis, and diuresed. Pt was discharged and instructed to follow up with PCP and AA within 1 wk. After his discharge, patient returned to ED 4 days after for increased weight gain and worsening abdominal pain/jaundice.     Mr. Diana was admitted to the CMICU on  for decompensated alcoholic hepatitis with high MELD score, severe HE, and LAURA. Hepatology consulted and followed to evaluate liver transplant candidacy. Pt started on albumin and lasix for LAURA and possibility for HRS. However, nephrology felt that LAURA is likely multifactorial: ATN given hypotensive episodes at OSH and toxic given severe hyperbilirubinemia; not truly HRS. Lactulose/rifaximin started  for HE and mental status slowly improved. Mr. Diana with significant ABD pain since admission with ABD distension. CT abdomen was significant for pericolonic fat stranding of right colon; no obstruction. Also with diffuse mesenteric edema. No significant ascites noted, no cholecystitis/cholelithiasis/ductal dilation. US liver with doppler showed appropriate vasculature.  He was stepped down to Hospital Medicine on 10/5.  Paracentesis was done on 10/9 with 3.3L removed and 10/12 with 1.8 removed, no infection. His diet was advanced and Dietary was consulted to help get adequate caloric intake. Listed for liver transplant 10/17 with MELD 36.    Pt received organ offer and underwent liver txp 10/19 which took place without complication.  Steroid induction used, CMV +/+.  Post operatively, LFTs/enzymes trended down nicely.  POD 5 U/S with elevated RIs and sluggish flow in HA system, otherwise ok.  Pt with LAURA prior to and s/p txp requiring dialysis.  He is tolerating HD with last HD 10/26.          Hospital Course:  Interval hx:  Abd pain improved with tylenol/tramadol.  Incision opened 10/27 (R lateral 6 cm) with serosang output and packed with wet to dry.  Wound vac placed today and drain sites resutured.  HD last yesterday.  Some UOP today- still minimal.  Plan for permcath placement in AM.    Scheduled Meds:   acetaminophen  500 mg Oral Q6H    albuterol-ipratropium 2.5mg-0.5mg/3mL  3 mL Nebulization Q6H WAKE    atovaquone  1,500 mg Oral Daily    bisacodyl  10 mg Oral Daily    bisacodyl  10 mg Rectal Once    docusate sodium  100 mg Oral TID PC    ergocalciferol  50,000 Units Oral Q7 Days    famotidine  20 mg Oral QHS    fluconazole  200 mg Oral Daily    heparin (porcine)  5,000 Units Subcutaneous Q8H    HYDROmorphone  0.2 mg Intravenous Once    mycophenolate  1,000 mg Oral BID    predniSONE  20 mg Oral Daily    tacrolimus  3 mg Oral BID    valganciclovir 50 mg/ml  100 mg Oral Every Mon, Wed, Fri      Continuous Infusions:   PRN Meds:sodium chloride, sodium chloride 0.9%, dextrose 50%, dextrose 50%, glucagon (human recombinant), glucose, glucose, insulin aspart, oxyCODONE, simethicone, traMADol    Review of Systems   Constitutional: Positive for activity change, appetite change and fatigue. Negative for diaphoresis and fever.   HENT: Negative for mouth sores.    Respiratory: Negative for cough, choking, shortness of breath and wheezing.    Cardiovascular: Positive for leg swelling. Negative for chest pain and palpitations.   Gastrointestinal: Positive for abdominal distention. Negative for abdominal pain, constipation, diarrhea, nausea and vomiting.   Genitourinary: Positive for decreased urine volume. Negative for difficulty urinating.   Musculoskeletal: Negative for arthralgias, back pain and joint swelling.   Skin: Positive for wound.   Allergic/Immunologic: Positive for immunocompromised state.   Neurological: Negative for tremors, weakness and headaches.   Hematological: Bruises/bleeds easily.   Psychiatric/Behavioral: Positive for dysphoric mood. Negative for confusion, hallucinations and sleep disturbance. The patient is nervous/anxious.    All other systems reviewed and are negative.    Objective:     Vital Signs (Most Recent):  Temp: 98.6 °F (37 °C) (10/29/17 1615)  Pulse: 100 (10/29/17 1902)  Resp: 18 (10/29/17 1902)  BP: 125/61 (10/29/17 1615)  SpO2: 99 % (10/29/17 1902) Vital Signs (24h Range):  Temp:  [98.1 °F (36.7 °C)-99 °F (37.2 °C)] 98.6 °F (37 °C)  Pulse:  [] 100  Resp:  [16-20] 18  SpO2:  [98 %-100 %] 99 %  BP: (119-133)/(60-77) 125/61     Weight: 81.2 kg (179 lb 0.2 oz)  Body mass index is 28.04 kg/m².    Intake/Output - Last 3 Shifts       10/27 0700 - 10/28 0659 10/28 0700 - 10/29 0659 10/29 0700 - 10/30 0659    P.O. 540 760 490    Blood       Other  800     Total Intake(mL/kg) 540 (6.7) 1560 (19.2) 490 (6)    Urine (mL/kg/hr) 0 (0) 280 (0.1) 75 (0.1)    Other  2600 (1.3)     Stool  0 (0) 0 (0)     Total Output 0 2880 75    Net +540 -1320 +415           Urine Occurrence 3 x  0 x    Stool Occurrence 0 x 0 x 0 x          Physical Exam   Constitutional: He is oriented to person, place, and time. He appears well-developed. No distress.   Temporal and distal extremity muscle wasting   HENT:   Head: Normocephalic and atraumatic.   Mouth/Throat: No oropharyngeal exudate.   Eyes: EOM are normal. Pupils are equal, round, and reactive to light. Scleral icterus is present.   Neck: Normal range of motion. Neck supple.   Cardiovascular: Normal rate, regular rhythm and normal heart sounds.  Exam reveals no gallop and no friction rub.    No murmur heard.  Pulmonary/Chest: Effort normal. No respiratory distress. He has no rales.   Dec R base   Abdominal: Soft. Bowel sounds are normal. He exhibits distension. There is no tenderness.   Chevron inc cdi no ssi   Musculoskeletal: Normal range of motion. He exhibits edema (1-2+ generalized).   Neurological: He is alert and oriented to person, place, and time.   Skin: Skin is warm and dry. He is not diaphoretic.   jaundiced   Psychiatric: He has a normal mood and affect. His behavior is normal. Judgment and thought content normal.   Nursing note and vitals reviewed.      Laboratory:  Immunosuppressants         Stop Route Frequency     tacrolimus capsule 3 mg      -- Oral 2 times daily     mycophenolate capsule 1,000 mg      -- Oral 2 times daily        CBC:     Recent Labs  Lab 10/29/17  0500   WBC 9.61   RBC 2.78*   HGB 8.5*   HCT 24.5*   PLT 92*   MCV 88   MCH 30.6   MCHC 34.7     CMP:     Recent Labs  Lab 10/29/17  0500   GLU 71   CALCIUM 7.5*   ALBUMIN 2.0*   PROT 4.2*   *   K 3.8   CO2 22*      BUN 31*   CREATININE 2.9*   ALKPHOS 168*   ALT 11   AST 22   BILITOT 3.8*     Labs within the past 24 hours have been reviewed.    Diagnostic Results:  I have personally reviewed all pertinent imaging studies.    Assessment/Plan:     * Liver transplant  10/19/2017 for acute alcoholic hepatitis    - LFTs/enzymes trended down nicely  - Bowel regimen started today, other than full return of bowels has met all post surgical marks  - Transitioned pain control to scheduled acetaminophen + Tramadol as hallucinations with oxycodone- tolerating well.  - Liver US POD 5 with elevated RIs and slowed flow HA system- will monitor labs and repeat prior to discharge.    - Wound vac placed today          LAURA (acute kidney injury)    - Pt reports UOP last night and this AM  - Lasix 100 mg x 1 today  - Nephrology following with us.  - HD last 10/28- tolerated well.  - Plan for permcath placement Monday if without continued improvement in prep for discharge        Long-term use of immunosuppressant medication    - See prophylactic immunotherapy          Prophylactic immunotherapy    - Continue tacrolimus, prednisone taper, Cellcept.  - Will monitor for signs of toxic side effects, check daily tacrolimus troughs, and change meds accordingly.        Acute blood loss anemia    - PRBC x 2 u 10/26 and responded nicely  - Monitor        Anasarca    - 2/2 liver disease, LAURA, and hypoalbuminemia  - Expect improvement once kidneys improve.    - Attempt to keep dressings dry        At risk for opportunistic infections    - D/C Dapsone secondary to worsening anemia and start atovaquone for PCP prophylaxis  - Continue Valcyte for CMV prophylaxis  - Continue fluconazole for fungal prophylaxis          Severe malnutrition    - Dietary consulted and following.   - Continue protein powder        Alcoholism /alcohol abuse    - Psych consulted, high risk for transplant. To complete ABU program s/p transplant          Physical deconditioning    - Working with PT/OT  - Monitor for outpt needs              VTE Risk Mitigation         Ordered     heparin (porcine) injection 5,000 Units  Every 8 hours     Route:  Subcutaneous        10/20/17 0346     Place sequential compression device  Until discontinued       10/20/17 0346     High Risk of VTE  Once      10/20/17 0346          The patients clinical status was discussed at multidisplinary rounds, involving transplant surgery, transplant medicine, pharmacy, nursing, nutrition, and social work    Discharge Planning:  Monitor kidney function --> may need outpt HD  Monitor HH needs vs rehab for deconditioned status      Geronimo Vera PA-C  Liver Transplant  [facility]

## 2017-10-30 NOTE — SUBJECTIVE & OBJECTIVE
Interval History:   Last received HD on Saturday with 2L removed. UOP ~200cc per day. Doing okay this morning. NPO for tunneled HD catheter. Denies any SOB.     Review of patient's allergies indicates:   Allergen Reactions    Bactrim [sulfamethoxazole-trimethoprim] Other (See Comments)     Mookie Trell Syndrome     Current Facility-Administered Medications   Medication Frequency    0.9%  NaCl infusion PRN    acetaminophen tablet 500 mg Q6H    albumin human 25% bottle 25 g Q6H    atovaquone suspension 1,500 mg Daily    bisacodyl EC tablet 10 mg Daily    bisacodyl suppository 10 mg Once    dextrose 50% injection 12.5 g PRN    dextrose 50% injection 25 g PRN    docusate sodium capsule 100 mg TID PC    ergocalciferol capsule 50,000 Units Q7 Days    famotidine tablet 20 mg QHS    fluconazole tablet 200 mg Daily    glucagon (human recombinant) injection 1 mg PRN    glucose chewable tablet 16 g PRN    glucose chewable tablet 24 g PRN    heparin (porcine) injection 5,000 Units Q8H    HYDROmorphone injection 0.2 mg Once    insulin aspart pen 0-5 Units QID (AC + HS) PRN    magnesium sulfate 2 g/50 ml IVPB Q2H    mycophenolate capsule 1,000 mg BID    oxyCODONE immediate release tablet 5 mg Q6H PRN    polyethylene glycol packet 17 g Daily    predniSONE tablet 20 mg Daily    simethicone chewable tablet 80 mg TID PRN    sodium bicarbonate tablet 1,300 mg BID    tacrolimus capsule 3 mg BID    traMADol tablet 50 mg Q4H PRN    valganciclovir 50 mg/ml oral solution 100 mg Every Mon, Wed, Fri       Objective:     Vital Signs (Most Recent):  Temp: 98.2 °F (36.8 °C) (10/30/17 0800)  Pulse: 103 (10/30/17 0815)  Resp: 18 (10/30/17 0815)  BP: 129/71 (10/30/17 0800)  SpO2: 99 % (10/30/17 0815)  O2 Device (Oxygen Therapy): room air (10/30/17 0815) Vital Signs (24h Range):  Temp:  [97.9 °F (36.6 °C)-98.7 °F (37.1 °C)] 98.2 °F (36.8 °C)  Pulse:  [] 103  Resp:  [16-20] 18  SpO2:  [97 %-100 %] 99 %  BP:  (119-133)/(61-71) 129/71     Weight: 79.3 kg (174 lb 13.2 oz) (10/30/17 0300)  Body mass index is 27.38 kg/m².  Body surface area is 1.94 meters squared.    I/O last 3 completed shifts:  In: 1130 [P.O.:1130]  Out: 485 [Urine:485]    Physical Exam   Constitutional: He is oriented to person, place, and time. He appears well-developed and well-nourished. No distress.   HENT:   Head: Normocephalic and atraumatic.   Eyes: EOM are normal. Scleral icterus is present.   Cardiovascular: Normal rate and regular rhythm.    Pulmonary/Chest: Effort normal and breath sounds normal. He has no rales.   Abdominal: Soft. He exhibits distension.   Musculoskeletal: He exhibits edema. He exhibits no deformity.   Neurological: He is alert and oriented to person, place, and time.   Skin: He is not diaphoretic.       Significant Labs:  CBC:   Recent Labs  Lab 10/30/17  0450   WBC 9.97   RBC 2.85*   HGB 9.1*   HCT 25.7*   *   MCV 90   MCH 31.9*   MCHC 35.4     CMP:   Recent Labs  Lab 10/30/17  0450   GLU 70   CALCIUM 8.2*   ALBUMIN 2.5*   PROT 4.8*   *   K 3.6   CO2 19*      BUN 41*   CREATININE 3.9*   ALKPHOS 175*   ALT 8*   AST 20   BILITOT 3.3*     All labs within the past 24 hours have been reviewed.     Significant Imaging:  Labs: Reviewed

## 2017-10-30 NOTE — PROGRESS NOTES
Patient still has not returned form dialysis . Spoke with his nurse Renee and she states they did not begin the procedure until 4pm , so requested we schedule to see him early tomorrow.   Zoë Murphy RN CWON  [phone]

## 2017-10-30 NOTE — PROGRESS NOTES
Acute dialysis started via right IJ CVC. Report received from Elvia Wagoner. Ports aspirated and flushed without difficulty. Lines connected and secured. Good blood flows established.

## 2017-10-30 NOTE — ASSESSMENT & PLAN NOTE
- Pt reports UOP last night and this AM  - Lasix 100 mg x 1 today  - Nephrology following with us.  - HD last 10/28- tolerated well.  - Plan for permcath placement Monday if without continued improvement in prep for discharge

## 2017-10-30 NOTE — PT/OT/SLP PROGRESS
Occupational Therapy  Treatment/ D/C recs change    Jhonny Diana   MRN: [id number]   Admitting Diagnosis: Liver replaced by transplant    OT Date of Treatment: 10/30/17   OT Start Time: 0927  OT Stop Time: 1010  OT Total Time (min): 43 min    Billable Minutes:  Therapeutic Activity 35 minutes and Therapeutic Exercise 8 minutes    General Precautions: Standard, fall  Orthopedic Precautions: N/A  Braces: N/A         Subjective:  Communicated with nurse prior to session.  Pt agreeable to skilled OT services.    Pain/Comfort  Pain Rating 1: 5/10  Location - Side 1: Bilateral  Location - Orientation 1: generalized  Location 1: abdomen  Pain Addressed 1: Distraction, Reposition  Pain Rating Post-Intervention 1: 5/10    Objective:  Patient found with: telemetry, wound vac  Pt received w/ HOB elevated. Pt stated he wanted to go for a walk instead of working on ADL's.      Functional Mobility:  Bed Mobility:  Supine to Sit: Minimum Assistance    Transfers:   Sit <> Stand Assistance: Stand By Assistance  Sit <> Stand Assistive Device: No Assistive Device  Bed <> Chair Technique: Stand Pivot  Bed <> Chair Transfer Assistance: Stand By Assistance  Bed <> Chair Assistive Device: Rolling Walker    Functional Ambulation: Pt ambulated 70 ft>standing rest break>30 ft>standing rest break>92 ft>standing rest break>116 ft. Pt ambulated all trials at sba w/ RW. No LOB noted. Pt required increased time to complete.       Balance:   Static Sit: FAIR+: Able to take MINIMAL challenges from all directions  Dynamic Sit: FAIR+: Maintains balance through MINIMAL excursions of active trunk motion  Static Stand: FAIR+: Takes MINIMAL challenges from all directions  Dynamic stand: FAIR+: Needs CLOSE SUPERVISION during gait and is able to right self with minor LOB    Therapeutic Activities and Exercises:  Pt educated on POC  Pt performed BUE strengthening of shoulder external rotation for 3x10 reps w/ green resistive theraband.   Pt performed BUE  "strengthening of seated rows for 3x10 reps w/ green resistive theraband.   Pt performed BUE strengthening of chest press for 3x10 reps w/ green resistive theraband.   Pt performed sit<>stand for 1 rep from EOB at sba w/o AD.    AM-PAC 6 CLICK ADL   How much help from another person does this patient currently need?   1 = Unable, Total/Dependent Assistance  2 = A lot, Maximum/Moderate Assistance  3 = A little, Minimum/Contact Guard/Supervision  4 = None, Modified [place]/Independent    Putting on and taking off regular lower body clothing? : 2  Bathing (including washing, rinsing, drying)?: 2  Toileting, which includes using toilet, bedpan, or urinal? : 2  Putting on and taking off regular upper body clothing?: 3  Taking care of personal grooming such as brushing teeth?: 3  Eating meals?: 4  Total Score: 16     AM-PAC Raw Score CMS "G-Code Modifier Level of Impairment Assistance   6 % Total / Unable   7 - 8 CM 80 - 100% Maximal Assist   9-13 CL 60 - 80% Moderate Assist   14 - 19 CK 40 - 60% Moderate Assist   20 - 22 CJ 20 - 40% Minimal Assist   23 CI 1-20% SBA / CGA   24 CH 0% Independent/ Mod I       Patient left up in chair with all lines intact and call button in reach    ASSESSMENT:  Jhonny Diana is a 27 y.o. male with a medical diagnosis of Liver replaced by transplant and presents with impairments listed below. Pt did well to participate and tolerate session. Pt progressing well w/ therapy. Pt displayed increased strength and endurance w/ oob activities. Pt D/C recs being changed to HH d/t pt progression w/ therapy and requirnign less assistance. Pt would benefit from HH to increase overall functioning and increase safety. Pt would benefit from skilled OT services to improve independence and overall occupational functioning.    Rehab identified problem list/impairments: Rehab identified problem list/impairments: weakness, impaired endurance, impaired self care skills, impaired functional mobilty, " gait instability, decreased ROM, edema    Rehab potential is good.    Activity tolerance: Good    Discharge recommendations: Discharge Facility/Level Of Care Needs: home with home health     Barriers to discharge: Barriers to Discharge: None    Equipment recommendations:       GOALS:    Occupational Therapy Goals        Problem: Occupational Therapy Goal    Goal Priority Disciplines Outcome Interventions   Occupational Therapy Goal     OT, PT/OT Ongoing (interventions implemented as appropriate)    Description:  Goals to be met by:  11/10/17    Patient will increase functional independence with ADLs by performing:    UE Dressing with Minimal Assistance. MET (10/29/2017)  LE Dressing with Moderate Assistance with AD as needed.  Grooming while standing at sink with Minimal Assistance.  Toileting from toilet with Minimal Assistance for hygiene and clothing management.   Supine to sit with Moderate Assistance.  Toilet transfer to bedside commode with Minimal Assistance.  Upper extremity exercise program per handout, with independence.                                   Plan:  Patient to be seen 5 x/week to address the above listed problems via self-care/home management, therapeutic activities, therapeutic exercises  Plan of Care expires: 11/26/17  Plan of Care reviewed with: patient    Uche Elias, OT  10/30/2017

## 2017-10-30 NOTE — PLAN OF CARE
"Problem: Patient Care Overview  Goal: Plan of Care Review  Outcome: Ongoing (interventions implemented as appropriate)  Patient's VSS for shift. Patient medicated once for pain when cleaning out open incision site. Patient remains AAOx4, calm, cooperative, and very pleasant. Right side of [place] packed with wet to dry dressing and abd pad, but still leaking badly. Incision site has unpleasant odor to it. Called charge nurse Nissa and Geronimo Vera to room. Decision made for us to put wound vac. Setting at -125 mmHg with serous drainage. Drain site below restitched by Geronimo. Drainage significantly decreased. Patient given abdominal binder and walker to aid in ambulation. Patient was able to ambulate with only walker from room to EXIT sign and back. Patient stated it "hurt like hell" to be on his legs, but it also felt great to be using his legs. Patient eating very well. Wants to speak to doctor about what he feels is a noticeable increase in swelling in his scrotum. Refused to let me look at it secondary to patient's feelings regarding modesty. Free from falls, injury, and skin breakdown.       "

## 2017-10-30 NOTE — PROGRESS NOTES
Attempted to see for wound vac issues.   Was told patient was in Dialysis and should be back to the room soon. Will try to see before the end of the day.  Zoë Murphy RN CWON  [id number]

## 2017-10-30 NOTE — PROGRESS NOTES
[facility]  Liver Transplant  Progress Note    Patient Name: Jhonny Diana  MRN: [id number]  Admission Date: 2017  Hospital Length of Stay: 33 days  Code Status: Full Code  Primary Care Provider: Provider Notinsystem  Post-Operative Day: 11    ORGAN:   LIVER  Disease Etiology: Acute Alcoholic Hepatitis  Donor Type:    - Brain Death  CDC High Risk:   No  Donor CMV Status:   Donor CMV Status: Positive  Donor HBcAB:   Negative  Donor HCV Status:   Negative  Whole or Partial: Whole Liver  Biliary Anastomosis: End to End  Arterial Anatomy: Standard  Subjective:     History of Present Illness:  Jhonny Diana is a 28 yo male with hx of childhood asthma and heavy alcohol abuse (fifth of liquor daily since teenager, last drink 2.5 wks ago per mother) who was transferred to [facility] for higher level of care/liver transplant evaluation. Patient initially presented to OSH for a 2 mo hx of progressively worsening abdominal pain with associated fatigue, jaundice, and abdominal distention with ~20# weight gain.  Reports family hx of alcoholic cirrhosis in father. Pt was recently diagnosed with alcoholic hepatitis during his admission at OSH on -9/15. During that hospitalization, patient was given ceftriaxone for SBP ppx, prednisone for alcoholic hepatitis, and diuresed. Pt was discharged and instructed to follow up with PCP and AA within 1 wk. After his discharge, patient returned to ED 4 days after for increased weight gain and worsening abdominal pain/jaundice.     Mr. Diana was admitted to the CMICU on  for decompensated alcoholic hepatitis with high MELD score, severe HE, and LAURA. Hepatology consulted and followed to evaluate liver transplant candidacy. Pt started on albumin and lasix for LAURA and possibility for HRS. However, nephrology felt that LAURA is likely multifactorial: ATN given hypotensive episodes at OSH and toxic given severe hyperbilirubinemia; not truly HRS. Lactulose/rifaximin started  for HE and mental status slowly improved. Mr. Diana with significant ABD pain since admission with ABD distension. CT abdomen was significant for pericolonic fat stranding of right colon; no obstruction. Also with diffuse mesenteric edema. No significant ascites noted, no cholecystitis/cholelithiasis/ductal dilation. US liver with doppler showed appropriate vasculature.  He was stepped down to Hospital Medicine on 10/5.  Paracentesis was done on 10/9 with 3.3L removed and 10/12 with 1.8 removed, no infection. His diet was advanced and Dietary was consulted to help get adequate caloric intake. Listed for liver transplant 10/17 with MELD 36.    Pt received organ offer and underwent liver txp 10/19 which took place without complication.  Steroid induction used, CMV +/+.  Post operatively, LFTs/enzymes trended down nicely.  POD 5 U/S with elevated RIs and sluggish flow in HA system, otherwise ok.  Pt with LAURA prior to and s/p txp requiring dialysis.  He is tolerating HD with last HD 10/26.      Hospital Course:  Interval hx:  Abd pain improved with acetaminophen/tramadol.  Incision opened 10/27 (R lateral 6 cm) with serosang output and packed with wet to dry.  Wound vac placed 10/29 and drain sites resutured.  Pt still draining from drain sites.  HD last 10/27.  Some UOP today- still minimal.  Plan for permcath placement today.  Pt's mom reports intermittent delirium/emotional lability still occurring day and night.      Scheduled Meds:   acetaminophen  500 mg Oral Q6H    albumin human 25%  25 g Intravenous Q6H    atovaquone  1,500 mg Oral Daily    bisacodyl  10 mg Oral Daily    bisacodyl  10 mg Rectal Once    docusate sodium  100 mg Oral TID PC    ergocalciferol  50,000 Units Oral Q7 Days    famotidine  20 mg Oral QHS    fluconazole  200 mg Oral Daily    heparin (porcine)  5,000 Units Subcutaneous Q8H    HYDROmorphone  0.2 mg Intravenous Once    magnesium sulfate  2 g Intravenous Q2H    mycophenolate   1,000 mg Oral BID    polyethylene glycol  17 g Oral Daily    predniSONE  20 mg Oral Daily    sodium bicarbonate  1,300 mg Oral BID    tacrolimus  2 mg Oral Daily    [START ON 10/31/2017] tacrolimus  3 mg Oral Daily    valganciclovir 50 mg/ml  100 mg Oral Every Mon, Wed, Fri     Continuous Infusions:   PRN Meds:sodium chloride 0.9%, dextrose 50%, dextrose 50%, glucagon (human recombinant), glucose, glucose, insulin aspart, oxyCODONE, simethicone, traMADol    Review of Systems   Constitutional: Positive for activity change, appetite change and fatigue. Negative for diaphoresis and fever.   HENT: Negative for mouth sores.    Respiratory: Negative for cough, choking, shortness of breath and wheezing.    Cardiovascular: Positive for leg swelling. Negative for chest pain and palpitations.   Gastrointestinal: Positive for abdominal distention. Negative for abdominal pain, constipation, diarrhea, nausea and vomiting.   Genitourinary: Positive for decreased urine volume. Negative for difficulty urinating.   Musculoskeletal: Negative for arthralgias, back pain and joint swelling.   Skin: Positive for wound.   Allergic/Immunologic: Positive for immunocompromised state.   Neurological: Negative for tremors, weakness and headaches.   Hematological: Bruises/bleeds easily.   Psychiatric/Behavioral: Positive for agitation, confusion and dysphoric mood. Negative for hallucinations and sleep disturbance. The patient is nervous/anxious.    All other systems reviewed and are negative.    Objective:     Vital Signs (Most Recent):  Temp: 98.2 °F (36.8 °C) (10/30/17 0800)  Pulse: 103 (10/30/17 0815)  Resp: 18 (10/30/17 0815)  BP: 129/71 (10/30/17 0800)  SpO2: 99 % (10/30/17 0815) Vital Signs (24h Range):  Temp:  [97.9 °F (36.6 °C)-98.7 °F (37.1 °C)] 98.2 °F (36.8 °C)  Pulse:  [100-107] 103  Resp:  [16-20] 18  SpO2:  [97 %-100 %] 99 %  BP: (119-133)/(61-71) 129/71     Weight: 79.3 kg (174 lb 13.2 oz)  Body mass index is 27.38  kg/m².    Intake/Output - Last 3 Shifts       10/28 0700 - 10/29 0659 10/29 0700 - 10/30 0659 10/30 0700 - 10/31 0659    P.O. 760 770     Other 800      Total Intake(mL/kg) 1560 (19.2) 770 (9.7)     Urine (mL/kg/hr) 280 (0.1) 205 (0.1)     Other 2600 (1.3)      Stool 0 (0) 0 (0)     Total Output 2880 205      Net -1320 +565             Urine Occurrence  0 x     Stool Occurrence 0 x 0 x           Physical Exam   Constitutional: He is oriented to person, place, and time. He appears well-developed. No distress.   Temporal and distal extremity muscle wasting   HENT:   Head: Normocephalic and atraumatic.   Mouth/Throat: No oropharyngeal exudate.   Eyes: EOM are normal. Pupils are equal, round, and reactive to light. Scleral icterus is present.   Neck: Normal range of motion. Neck supple.   Cardiovascular: Normal rate, regular rhythm and normal heart sounds.  Exam reveals no gallop and no friction rub.    No murmur heard.  Pulmonary/Chest: Effort normal. No respiratory distress. He has no rales.   Dec R base   Abdominal: Soft. Bowel sounds are normal. He exhibits distension. There is no tenderness.   Chevron inc with wound vac to R lateral side x 7 cm  Serosang output   Musculoskeletal: Normal range of motion. He exhibits edema (1-2+ generalized).   Neurological: He is alert and oriented to person, place, and time.   Skin: Skin is warm and dry. He is not diaphoretic.   jaundiced   Psychiatric: He has a normal mood and affect. His behavior is normal. Judgment and thought content normal.   Nursing note and vitals reviewed.    Laboratory:  Immunosuppressants         Stop Route Frequency     tacrolimus capsule 3 mg      -- Oral Daily     tacrolimus capsule 2 mg      -- Oral Daily     mycophenolate capsule 1,000 mg      -- Oral 2 times daily        CBC:     Recent Labs  Lab 10/30/17  0450   WBC 9.97   RBC 2.85*   HGB 9.1*   HCT 25.7*   *   MCV 90   MCH 31.9*   MCHC 35.4     CMP:     Recent Labs  Lab 10/30/17  0450   GLU  70   CALCIUM 8.2*   ALBUMIN 2.5*   PROT 4.8*   *   K 3.6   CO2 19*      BUN 41*   CREATININE 3.9*   ALKPHOS 175*   ALT 8*   AST 20   BILITOT 3.3*     Labs within the past 24 hours have been reviewed.    Diagnostic Results:  I have personally reviewed all pertinent imaging studies.    Assessment/Plan:     * Liver transplant 10/19/2017 for acute alcoholic hepatitis    - LFTs/enzymes trended down nicely  - Bowel regimen started today, other than full return of bowels has met all post surgical marks  - Transitioned pain control to scheduled acetaminophen + Tramadol as hallucinations with oxycodone- tolerating well.  - Liver US POD 5 with elevated RIs and slowed flow HA system- will monitor labs and repeat prior to discharge.    - Wound vac placed 10/29        LAURA (acute kidney injury)    - Pt reports minimal UOP yesterday  - Nephrology following with us.  - HD last 10/28- tolerated well.  - Plan for permcath placement today  - Set up for outpt HD        Long-term use of immunosuppressant medication    - See prophylactic immunotherapy          Prophylactic immunotherapy    - Continue tacrolimus, prednisone taper, Cellcept.  - Will monitor for signs of toxic side effects, check daily tacrolimus troughs, and change meds accordingly.        Acute blood loss anemia    - PRBC x 2 u 10/26 and responded nicely  - Monitor        Anasarca    - 2/2 liver disease, LAURA, and hypoalbuminemia  - Expect improvement once kidneys improve.    - Attempt to keep dressings dry        At risk for opportunistic infections    - D/C Dapsone secondary to worsening anemia and continue atovaquone for PCP prophylaxis  - Continue Valcyte for CMV prophylaxis  - Continue fluconazole for fungal prophylaxis          Severe malnutrition    - Dietary consulted and following.   - Continue protein powder        Alcoholism /alcohol abuse    - Psych consulted, high risk for transplant. To complete ABU program s/p transplant          Physical  deconditioning    - Working with PT/OT  - Current rec HH PT/OT              VTE Risk Mitigation         Ordered     heparin (porcine) injection 5,000 Units  Every 8 hours     Route:  Subcutaneous        10/30/17 0703     Place sequential compression device  Until discontinued      10/20/17 0346     High Risk of VTE  Once      10/20/17 0346          The patients clinical status was discussed at multidisplinary rounds, involving transplant surgery, transplant medicine, pharmacy, nursing, nutrition, and social work    Discharge Planning:  Monitor kidney function --> may need outpt HD  Monitor HH needs vs rehab for deconditioned status      Geronimo Vera PA-C  Liver Transplant  [facility]

## 2017-10-30 NOTE — PT/OT/SLP PROGRESS
Physical Therapy      Jhonny Diana  MRN: [id number]    Patient not seen today secondary to Patient out on surgical procedure according to his mother. Will follow-up on next scheduled visit.    Son Sandhu, PTA

## 2017-10-30 NOTE — PROGRESS NOTES
SW Update:    SW presented to pt's room for follow up and continuation of care. Pt presented as alert and oriented but going downstairs for a procedure. Pt's mother is present in room and is alert and oriented x 4 and communicative. Per medical team, currently recommending pt to discharge to inpatient rehab, will continue monitoring recs. Pt will need outpatient HD on a MWF schedule. EZEKIEL faxed referral to [facility] (ph# [phone]/fax# [phone]) requesting [facility] on MWF schedule. Pt will also need a wound vac. EZEKIEL has provided [employer] Wound Vac form to [facility]. EZEKIEL will fax once it is completed.     EZEKIEL was able to answer all of pt's mothers questions about the LR Apartment and necessities needed. Mom provided SW with [facility] which indicated pt's fee to be $0/night. Mother is prepared to pay $1 deposit. Mother reports coping well with aid of family and staff support and denies any mental health difficulties at this time. EZEKIEL remains available for education, resources, support, and discharge planning as appropriate.

## 2017-10-30 NOTE — PROGRESS NOTES
Acute dialysis completed. 3 hours, 2 liters removed. Ports heparin locked, capped and taped. Tolerated tx well. Report given to Luis SHEEHAN.

## 2017-10-30 NOTE — PLAN OF CARE
Problem: Patient Care Overview  Goal: Plan of Care Review  Outcome: Ongoing (interventions implemented as appropriate)  Plan of care reviewed with patient. Pt verbalized understanding. Pt AAOX4. Pt free from falls, injuries and trauma.  Pt free from skin breakdown.  Pt VSS and in NAD.  Pt afebrile.  Pt had no complaints of SOB, N/V or CP.  Pts pain moderately controlled with prn pain medications. Scant UOP this shift. No BM this shift. NPO after midnight for perma cath placement. Wound vac intact, scant output. Continuous oozing from IRISH site. Pt had uneventful evening. Pt in low locked bed with personal items and call light within reach. Fall precautions maintained. Mother remained at bedside.

## 2017-10-30 NOTE — ASSESSMENT & PLAN NOTE
- D/C Dapsone secondary to worsening anemia and continue atovaquone for PCP prophylaxis  - Continue Valcyte for CMV prophylaxis  - Continue fluconazole for fungal prophylaxis

## 2017-10-30 NOTE — ASSESSMENT & PLAN NOTE
- Pt reports minimal UOP yesterday  - Nephrology following with us.  - HD last 10/28- tolerated well.  - Plan for permcath placement today  - Set up for outpt HD

## 2017-10-30 NOTE — SUBJECTIVE & OBJECTIVE
Scheduled Meds:   acetaminophen  500 mg Oral Q6H    albuterol-ipratropium 2.5mg-0.5mg/3mL  3 mL Nebulization Q6H WAKE    atovaquone  1,500 mg Oral Daily    bisacodyl  10 mg Oral Daily    bisacodyl  10 mg Rectal Once    docusate sodium  100 mg Oral TID PC    ergocalciferol  50,000 Units Oral Q7 Days    famotidine  20 mg Oral QHS    fluconazole  200 mg Oral Daily    heparin (porcine)  5,000 Units Subcutaneous Q8H    HYDROmorphone  0.2 mg Intravenous Once    mycophenolate  1,000 mg Oral BID    predniSONE  20 mg Oral Daily    tacrolimus  3 mg Oral BID    valganciclovir 50 mg/ml  100 mg Oral Every Mon, Wed, Fri     Continuous Infusions:   PRN Meds:sodium chloride, sodium chloride 0.9%, dextrose 50%, dextrose 50%, glucagon (human recombinant), glucose, glucose, insulin aspart, oxyCODONE, simethicone, traMADol    Review of Systems   Constitutional: Positive for activity change, appetite change and fatigue. Negative for diaphoresis and fever.   HENT: Negative for mouth sores.    Respiratory: Negative for cough, choking, shortness of breath and wheezing.    Cardiovascular: Positive for leg swelling. Negative for chest pain and palpitations.   Gastrointestinal: Positive for abdominal distention. Negative for abdominal pain, constipation, diarrhea, nausea and vomiting.   Genitourinary: Positive for decreased urine volume. Negative for difficulty urinating.   Musculoskeletal: Negative for arthralgias, back pain and joint swelling.   Skin: Positive for wound.   Allergic/Immunologic: Positive for immunocompromised state.   Neurological: Negative for tremors, weakness and headaches.   Hematological: Bruises/bleeds easily.   Psychiatric/Behavioral: Positive for dysphoric mood. Negative for confusion, hallucinations and sleep disturbance. The patient is nervous/anxious.    All other systems reviewed and are negative.    Objective:     Vital Signs (Most Recent):  Temp: 98.6 °F (37 °C) (10/29/17 1615)  Pulse: 100  (10/29/17 1902)  Resp: 18 (10/29/17 1902)  BP: 125/61 (10/29/17 1615)  SpO2: 99 % (10/29/17 1902) Vital Signs (24h Range):  Temp:  [98.1 °F (36.7 °C)-99 °F (37.2 °C)] 98.6 °F (37 °C)  Pulse:  [] 100  Resp:  [16-20] 18  SpO2:  [98 %-100 %] 99 %  BP: (119-133)/(60-77) 125/61     Weight: 81.2 kg (179 lb 0.2 oz)  Body mass index is 28.04 kg/m².    Intake/Output - Last 3 Shifts       10/27 0700 - 10/28 0659 10/28 0700 - 10/29 0659 10/29 0700 - 10/30 0659    P.O. 540 760 490    Blood       Other  800     Total Intake(mL/kg) 540 (6.7) 1560 (19.2) 490 (6)    Urine (mL/kg/hr) 0 (0) 280 (0.1) 75 (0.1)    Other  2600 (1.3)     Stool 0 (0) 0 (0)     Total Output 0 2880 75    Net +540 -1320 +415           Urine Occurrence 3 x  0 x    Stool Occurrence 0 x 0 x 0 x          Physical Exam   Constitutional: He is oriented to person, place, and time. He appears well-developed. No distress.   Temporal and distal extremity muscle wasting   HENT:   Head: Normocephalic and atraumatic.   Mouth/Throat: No oropharyngeal exudate.   Eyes: EOM are normal. Pupils are equal, round, and reactive to light. Scleral icterus is present.   Neck: Normal range of motion. Neck supple.   Cardiovascular: Normal rate, regular rhythm and normal heart sounds.  Exam reveals no gallop and no friction rub.    No murmur heard.  Pulmonary/Chest: Effort normal. No respiratory distress. He has no rales.   Dec R base   Abdominal: Soft. Bowel sounds are normal. He exhibits distension. There is no tenderness.   Chevron inc cdi no ssi   Musculoskeletal: Normal range of motion. He exhibits edema (1-2+ generalized).   Neurological: He is alert and oriented to person, place, and time.   Skin: Skin is warm and dry. He is not diaphoretic.   jaundiced   Psychiatric: He has a normal mood and affect. His behavior is normal. Judgment and thought content normal.   Nursing note and vitals reviewed.      Laboratory:  Immunosuppressants         Stop Route Frequency      tacrolimus capsule 3 mg      -- Oral 2 times daily     mycophenolate capsule 1,000 mg      -- Oral 2 times daily        CBC:     Recent Labs  Lab 10/29/17  0500   WBC 9.61   RBC 2.78*   HGB 8.5*   HCT 24.5*   PLT 92*   MCV 88   MCH 30.6   MCHC 34.7     CMP:     Recent Labs  Lab 10/29/17  0500   GLU 71   CALCIUM 7.5*   ALBUMIN 2.0*   PROT 4.2*   *   K 3.8   CO2 22*      BUN 31*   CREATININE 2.9*   ALKPHOS 168*   ALT 11   AST 22   BILITOT 3.8*     Labs within the past 24 hours have been reviewed.    Diagnostic Results:  I have personally reviewed all pertinent imaging studies.

## 2017-10-30 NOTE — PLAN OF CARE
Patient's VSS and in NAD for shift. Patient remains AAOx4, calm, cooperative, and pleasant. He is ambulating around the room with his walker with increasing dexterity and strength. He does need help pushing up from bed or chair to  walker. Mother still pulling self-meds 100%. Patient gone for most of shift today as he had permacath placed in morning and then left at 3 pm for dialysis. 2 liters removed in dialysis. Patient reports that he just had a bowel movement, the first in several days. Right side of incision still has woundvac intact. There is still some leakage around site but it is minimal compared to previous shifts. Urostomy bag covers drain site that continues to leak serous fluid as well. Wound care attempted to see patient today to replace wound vac and examine the wound, but unable to do so as patient was in and out of room so much. Patient did walk hallways with occupational therapy with minimal distress. Appetite is good, although he is complaining regarding some of the pre-selected meals that are being sent. Patient receiving 2 mag riders today and 2 doses of albumin.

## 2017-10-30 NOTE — ASSESSMENT & PLAN NOTE
- LFTs/enzymes trended down nicely  - Bowel regimen started today, other than full return of bowels has met all post surgical marks  - Transitioned pain control to scheduled acetaminophen + Tramadol as hallucinations with oxycodone- tolerating well.  - Liver US POD 5 with elevated RIs and slowed flow HA system- will monitor labs and repeat prior to discharge.    - Wound vac placed today

## 2017-10-30 NOTE — SUBJECTIVE & OBJECTIVE
Scheduled Meds:   acetaminophen  500 mg Oral Q6H    albumin human 25%  25 g Intravenous Q6H    atovaquone  1,500 mg Oral Daily    bisacodyl  10 mg Oral Daily    bisacodyl  10 mg Rectal Once    docusate sodium  100 mg Oral TID PC    ergocalciferol  50,000 Units Oral Q7 Days    famotidine  20 mg Oral QHS    fluconazole  200 mg Oral Daily    heparin (porcine)  5,000 Units Subcutaneous Q8H    HYDROmorphone  0.2 mg Intravenous Once    magnesium sulfate  2 g Intravenous Q2H    mycophenolate  1,000 mg Oral BID    polyethylene glycol  17 g Oral Daily    predniSONE  20 mg Oral Daily    sodium bicarbonate  1,300 mg Oral BID    tacrolimus  2 mg Oral Daily    [START ON 10/31/2017] tacrolimus  3 mg Oral Daily    valganciclovir 50 mg/ml  100 mg Oral Every Mon, Wed, Fri     Continuous Infusions:   PRN Meds:sodium chloride 0.9%, dextrose 50%, dextrose 50%, glucagon (human recombinant), glucose, glucose, insulin aspart, oxyCODONE, simethicone, traMADol    Review of Systems   Constitutional: Positive for activity change, appetite change and fatigue. Negative for diaphoresis and fever.   HENT: Negative for mouth sores.    Respiratory: Negative for cough, choking, shortness of breath and wheezing.    Cardiovascular: Positive for leg swelling. Negative for chest pain and palpitations.   Gastrointestinal: Positive for abdominal distention. Negative for abdominal pain, constipation, diarrhea, nausea and vomiting.   Genitourinary: Positive for decreased urine volume. Negative for difficulty urinating.   Musculoskeletal: Negative for arthralgias, back pain and joint swelling.   Skin: Positive for wound.   Allergic/Immunologic: Positive for immunocompromised state.   Neurological: Negative for tremors, weakness and headaches.   Hematological: Bruises/bleeds easily.   Psychiatric/Behavioral: Positive for agitation, confusion and dysphoric mood. Negative for hallucinations and sleep disturbance. The patient is  nervous/anxious.    All other systems reviewed and are negative.    Objective:     Vital Signs (Most Recent):  Temp: 98.2 °F (36.8 °C) (10/30/17 0800)  Pulse: 103 (10/30/17 0815)  Resp: 18 (10/30/17 0815)  BP: 129/71 (10/30/17 0800)  SpO2: 99 % (10/30/17 0815) Vital Signs (24h Range):  Temp:  [97.9 °F (36.6 °C)-98.7 °F (37.1 °C)] 98.2 °F (36.8 °C)  Pulse:  [100-107] 103  Resp:  [16-20] 18  SpO2:  [97 %-100 %] 99 %  BP: (119-133)/(61-71) 129/71     Weight: 79.3 kg (174 lb 13.2 oz)  Body mass index is 27.38 kg/m².    Intake/Output - Last 3 Shifts       10/28 0700 - 10/29 0659 10/29 0700 - 10/30 0659 10/30 0700 - 10/31 0659    P.O. 760 770     Other 800      Total Intake(mL/kg) 1560 (19.2) 770 (9.7)     Urine (mL/kg/hr) 280 (0.1) 205 (0.1)     Other 2600 (1.3)      Stool 0 (0) 0 (0)     Total Output 2880 205      Net -1320 +565             Urine Occurrence  0 x     Stool Occurrence 0 x 0 x           Physical Exam   Constitutional: He is oriented to person, place, and time. He appears well-developed. No distress.   Temporal and distal extremity muscle wasting   HENT:   Head: Normocephalic and atraumatic.   Mouth/Throat: No oropharyngeal exudate.   Eyes: EOM are normal. Pupils are equal, round, and reactive to light. Scleral icterus is present.   Neck: Normal range of motion. Neck supple.   Cardiovascular: Normal rate, regular rhythm and normal heart sounds.  Exam reveals no gallop and no friction rub.    No murmur heard.  Pulmonary/Chest: Effort normal. No respiratory distress. He has no rales.   Dec R base   Abdominal: Soft. Bowel sounds are normal. He exhibits distension. There is no tenderness.   Chevron inc with wound vac to R lateral side x 7 cm  Serosang output   Musculoskeletal: Normal range of motion. He exhibits edema (1-2+ generalized).   Neurological: He is alert and oriented to person, place, and time.   Skin: Skin is warm and dry. He is not diaphoretic.   jaundiced   Psychiatric: He has a normal mood and  affect. His behavior is normal. Judgment and thought content normal.   Nursing note and vitals reviewed.    Laboratory:  Immunosuppressants         Stop Route Frequency     tacrolimus capsule 3 mg      -- Oral Daily     tacrolimus capsule 2 mg      -- Oral Daily     mycophenolate capsule 1,000 mg      -- Oral 2 times daily        CBC:     Recent Labs  Lab 10/30/17  0450   WBC 9.97   RBC 2.85*   HGB 9.1*   HCT 25.7*   *   MCV 90   MCH 31.9*   MCHC 35.4     CMP:     Recent Labs  Lab 10/30/17  0450   GLU 70   CALCIUM 8.2*   ALBUMIN 2.5*   PROT 4.8*   *   K 3.6   CO2 19*      BUN 41*   CREATININE 3.9*   ALKPHOS 175*   ALT 8*   AST 20   BILITOT 3.3*     Labs within the past 24 hours have been reviewed.    Diagnostic Results:  I have personally reviewed all pertinent imaging studies.

## 2017-10-30 NOTE — PROGRESS NOTES
[facility] Procedure Note: A right IJ Bard, Glidepath 14.5 Fr. x 23cm tunneled catheter was inserted by Dr. Rivera. Lot #[id number]. The catheter was inserted and verified under flouroscopy. OK to use the catheter per Dr. Rivera.  Heparin 1000 units/ml was instilled to fill each catheter lumen. Arterial lumen = 1.8 ml; Venous lumen = 1.8 ml; total Heparin = 3600 units.

## 2017-10-30 NOTE — ASSESSMENT & PLAN NOTE
- likely ischemic ATN from decreased renal perfusion (volume shifts from paracentesis, anemia). Urine sediment with muddy brown casts  - underwent OLTx on 10/19; received intraop CRRT  - remains dialysis dependent at this time  - currently oliguric  - last HD on Saturday  - planning for perm cath today  - will arrange for HD today with UF goal 2-3L to help with hypervolemia  - next treatment tentatively planned for Wednesday

## 2017-10-30 NOTE — PLAN OF CARE
Problem: Occupational Therapy Goal  Goal: Occupational Therapy Goal  Goals to be met by:  11/10/17    Patient will increase functional independence with ADLs by performing:    UE Dressing with Minimal Assistance. MET (10/29/2017)  LE Dressing with Moderate Assistance with AD as needed.  Grooming while standing at sink with Minimal Assistance.  Toileting from toilet with Minimal Assistance for hygiene and clothing management.   Supine to sit with Moderate Assistance.  Toilet transfer to bedside commode with Minimal Assistance.  Upper extremity exercise program per handout, with independence.                  Continue OT POC    Comments: Uche Elias OTR/L  10/30/2017

## 2017-10-30 NOTE — PROCEDURES
Procedure Date: 10/30/17    (s) and Role:   Matt Rivera MD    Indication: HD access    Pre-op Diagnosis:    LAURA requiring HD    Post-op Diagnosis;  LAURA requiring HD    Procedure:   1. Insertion Tunneled HD Catheter with Fluoro   2. Conscious Sedation     Anesthesia: IV Sedation + Local     Findings/Key Components:   Catheter placed in RA. Good flush and draw through each port.  Estimated Blood Loss: 5mL     Specimens     None         PROCEDURE: After obtaining consent, the patient was taken to the [facility]NA suite. Sedation was administered. The right neck and chest were prepped and draped in usual sterile fashion. We began using ultrasound guidance to examine the right internal jugular vein. It appeared to be widely patent and was free of thrombus that appeared suitable for access for HD catheter. We accessed the internal jugular vein using a Seldinger technique with an micropunture needle without difficulty, then the thin wire was passed into the superior vena cava through the heart into the inferior vena cava. The wire position was confirmed with intraoperative fluoroscopy, then a 5Fr sheath was introduced over the wire. The wire was removed and the the guidewire was passed into the IVC under fluoroscopic guidance. Counterincision was made at the venotomy and on the chest wall just below the clavicle. Local anesthesia was used to anesthetize the track and a tunneler was used to pass the 23cm GlidePath catheter underneath the chest wall until the felt cuff was just underneath the counter incision. The 5fr sheath was removed and the vein was dilated using serial dilators. Then the large peel-away sheath was then inserted over the guidewire under fluoroscopic guidance. The dilator and wire were removed. The catheter was placed through the peel-away sheath and positioned appropriately at center of RA. Fluoroscopy was used to confirm that the catheter tip was in appropriate position and that there was no  kinking at the apex of the catheter. A permanent recording of the catheter position was also taken with fluoroscopy. Both lumens had excellent draw and flush. The catheter was then secured in place with 3-0 Prolene. The counterincision in the neck was closed with a 3-0 Vicryl. There were no immediate complications. Patient tolerated the procedure well and discharged in stable condition.     Anesthesia:  Conscious sedation was achieved with 200 mcg of Fentanyl and 2 mg of Midazolam (Versed). Local anesthesia was achieved with 20 ml of Lidocaine 2%. Moderate conscious sedation was performed and cardiorespiratory functions were monitored the entire procedure by me and Shahram Roy RN. Sedation began at 1109am and concluded at 1149am, totaling 40 minutes.

## 2017-10-30 NOTE — PROGRESS NOTES
[facility]  Nephrology  Progress Note     Patient Name: Jhonny Diana  MRN: [id number]  Admission Date: 9/27/2017  Hospital Length of Stay: 30 days  Attending Provider: Solis Chapa MD   Primary Care Physician: Provider Notinsystem  Principal Problem:Liver replaced by transplant     Subjective:      HPI: Jhonny Diana is a 26 yo male with pMHX of childhood asthma and alcohol abuse who was was transferred [place] [facility] [place] evaluation for worsening liver functions.  Information was obtained from family at bedside and review of outside records, given that patient is obtunded.  According to mother at bedside, patient has had multiple admissions to the hospital over the past 6 months for recurrent abdominal pain.  She reports that he has been been seen the ED and has been diagnosed with diverticulitis, UTIs, gastroenteritis, and cholecystitis.  He was never admitted to the hospital and was always discharged.  On 09/06, patient presented to OSH for worsening abdominal pain and distension.  He was diagnosed at that time with ETOH hepatitis.  He was treated with steroids, ceftriaxone for SBP prophylaxis, and diuresed at that time for volume management.  He was discharged home on the 15th, after clearance from GI,a nd was instructed to follow-up with his PCP 1 week after discharge.  He presented to the hospital on 09/25 for worsening abdominal pain and distension with 20 kg weight gain.  He was transferred [place] [facility] [place] care and Nephrology was consulted for LAURA.     On review of outside records, patient Scr on admission was 1.5 (with baseline at 1.0).  According to the family at the bedside, he was not urinating well at the OSH, and not responding to diuretics (spirionolactone/lasix combo).  His mother reports that patient continued to c/o abdominal and inability to urinate, but no chicas was placed until transfer to [facility] yesterday.  Record reviews shows relative hypotension with BPs in the low 90's.   His mother reports that the nursing staff was administrating his narcotic regimen based on his blood pressures, and at times, medication was held due to hypotension.  Since admission to hospital, his UOP has improved, responding well in Lasix 40 IV (770 ml since admission, 700 ml so far this shift at time of consultation).  Admit labs revealed a SCr of 2.6, this morning improved down to 2.4.  Bilirubin remains elevated, >14.  Abdomen remains with tight distension, unable to perform paracentesis, as no significant fluid pocket found.  Mother reports patient did not have para's done at OSH.     Labs on the morning of 09/27 with bicarb of 15.  ABG with alkalemia and primary respiratory alkalosis and anion gap acidosis.      Interval History:   [facility] consulted for permacath placement for outpatient HD.            Review of patient's allergies indicates:   Allergen Reactions    Bactrim [sulfamethoxazole-trimethoprim] Other (See Comments)       Mookie Trell Syndrome           Current Facility-Administered Medications   Medication Frequency    0.9%  NaCl infusion (for blood administration) Q24H PRN    acetaminophen tablet 500 mg Q6H    albuterol-ipratropium 2.5mg-0.5mg/3mL nebulizer solution 3 mL Q6H WAKE    atovaquone suspension 1,500 mg Daily    bisacodyl EC tablet 10 mg Daily    dextrose 50% injection 12.5 g PRN    dextrose 50% injection 25 g PRN    docusate sodium capsule 100 mg TID PC    ergocalciferol capsule 50,000 Units Q7 Days    famotidine tablet 20 mg QHS    fluconazole tablet 200 mg Daily    glucagon (human recombinant) injection 1 mg PRN    glucose chewable tablet 16 g PRN    glucose chewable tablet 24 g PRN    heparin (porcine) injection 5,000 Units Q8H    insulin aspart pen 0-5 Units QID (AC + HS) PRN    mycophenolate capsule 1,000 mg BID    oxycodone immediate release tablet 10 mg Q6H PRN    oxyCODONE immediate release tablet 5 mg Q6H PRN    predniSONE tablet 20 mg Daily    simethicone  chewable tablet 80 mg TID PRN    tacrolimus capsule 2 mg BID    traMADol tablet 50 mg Q4H PRN    valganciclovir 50 mg/ml oral solution 100 mg Every Mon, Wed, Fri         Objective:          Body mass index is 28.04 kg/m².  Body surface area is 1.96 meters squared.       Physical Exam   Constitutional: He appears well-developed and well-nourished. No distress.   HENT:   Head: Normocephalic and atraumatic.   Cardiovascular: Normal rate and regular rhythm.    Pulmonary/Chest: Effort normal. He has decreased breath sounds. He has no wheezes. He has no rales.   Abdominal: Soft.   Musculoskeletal: He exhibits edema. He exhibits no deformity.   Neurological: He is alert.   Skin: Skin is warm.         Significant Labs:  All labs within the past 24 hours have been reviewed.      Significant Imaging:  Labs: Reviewed     Assessment/Plan:          LAURA (acute kidney injury)     Will place tunneled HD catheter for ongoing HD.

## 2017-10-30 NOTE — ASSESSMENT & PLAN NOTE
- LFTs/enzymes trended down nicely  - Bowel regimen started today, other than full return of bowels has met all post surgical marks  - Transitioned pain control to scheduled acetaminophen + Tramadol as hallucinations with oxycodone- tolerating well.  - Liver US POD 5 with elevated RIs and slowed flow HA system- will monitor labs and repeat prior to discharge.    - Wound vac placed 10/29

## 2017-10-31 PROBLEM — D84.9 IMMUNOSUPPRESSION: Status: ACTIVE | Noted: 2017-10-31

## 2017-10-31 LAB
ALBUMIN SERPL BCP-MCNC: 2.7 G/DL
ALP SERPL-CCNC: 165 U/L
ALT SERPL W/O P-5'-P-CCNC: 7 U/L
ANION GAP SERPL CALC-SCNC: 10 MMOL/L
AST SERPL-CCNC: 19 U/L
BASOPHILS # BLD AUTO: 0.04 K/UL
BASOPHILS NFR BLD: 0.5 %
BILIRUB SERPL-MCNC: 3 MG/DL
BUN SERPL-MCNC: 27 MG/DL
CALCIUM SERPL-MCNC: 8.4 MG/DL
CHLORIDE SERPL-SCNC: 104 MMOL/L
CO2 SERPL-SCNC: 22 MMOL/L
CREAT SERPL-MCNC: 3 MG/DL
DIFFERENTIAL METHOD: ABNORMAL
EOSINOPHIL # BLD AUTO: 0.1 K/UL
EOSINOPHIL NFR BLD: 0.6 %
ERYTHROCYTE [DISTWIDTH] IN BLOOD BY AUTOMATED COUNT: 19.9 %
EST. GFR  (AFRICAN AMERICAN): 31.2 ML/MIN/1.73 M^2
EST. GFR  (NON AFRICAN AMERICAN): 27 ML/MIN/1.73 M^2
GLUCOSE SERPL-MCNC: 72 MG/DL
HCT VFR BLD AUTO: 23.8 %
HGB BLD-MCNC: 8.1 G/DL
IMM GRANULOCYTES # BLD AUTO: 0.08 K/UL
IMM GRANULOCYTES NFR BLD AUTO: 0.9 %
LYMPHOCYTES # BLD AUTO: 1.3 K/UL
LYMPHOCYTES NFR BLD: 14.6 %
MAGNESIUM SERPL-MCNC: 1.9 MG/DL
MCH RBC QN AUTO: 31.2 PG
MCHC RBC AUTO-ENTMCNC: 34 G/DL
MCV RBC AUTO: 92 FL
MONOCYTES # BLD AUTO: 0.5 K/UL
MONOCYTES NFR BLD: 6.3 %
NEUTROPHILS # BLD AUTO: 6.6 K/UL
NEUTROPHILS NFR BLD: 77.1 %
NRBC BLD-RTO: 0 /100 WBC
PHOSPHATE SERPL-MCNC: 2.4 MG/DL
PLATELET # BLD AUTO: 100 K/UL
PMV BLD AUTO: 10.6 FL
POTASSIUM SERPL-SCNC: 3.4 MMOL/L
PROT SERPL-MCNC: 4.7 G/DL
RBC # BLD AUTO: 2.6 M/UL
SODIUM SERPL-SCNC: 136 MMOL/L
TACROLIMUS BLD-MCNC: 7.9 NG/ML
WBC # BLD AUTO: 8.54 K/UL

## 2017-10-31 PROCEDURE — 97530 THERAPEUTIC ACTIVITIES: CPT

## 2017-10-31 PROCEDURE — 85025 COMPLETE CBC W/AUTO DIFF WBC: CPT

## 2017-10-31 PROCEDURE — 63600175 PHARM REV CODE 636 W HCPCS: Performed by: PHYSICIAN ASSISTANT

## 2017-10-31 PROCEDURE — 63600175 PHARM REV CODE 636 W HCPCS: Performed by: NURSE PRACTITIONER

## 2017-10-31 PROCEDURE — 63600175 PHARM REV CODE 636 W HCPCS

## 2017-10-31 PROCEDURE — 25000003 PHARM REV CODE 250: Performed by: TRANSPLANT SURGERY

## 2017-10-31 PROCEDURE — 25000003 PHARM REV CODE 250: Performed by: PHYSICIAN ASSISTANT

## 2017-10-31 PROCEDURE — 97535 SELF CARE MNGMENT TRAINING: CPT

## 2017-10-31 PROCEDURE — 83735 ASSAY OF MAGNESIUM: CPT

## 2017-10-31 PROCEDURE — 84100 ASSAY OF PHOSPHORUS: CPT

## 2017-10-31 PROCEDURE — 99232 SBSQ HOSP IP/OBS MODERATE 35: CPT | Mod: 24,,, | Performed by: INTERNAL MEDICINE

## 2017-10-31 PROCEDURE — 97608 NEG PRS WND THER NDME>50SQCM: CPT

## 2017-10-31 PROCEDURE — 63600175 PHARM REV CODE 636 W HCPCS: Performed by: TRANSPLANT SURGERY

## 2017-10-31 PROCEDURE — 80053 COMPREHEN METABOLIC PANEL: CPT

## 2017-10-31 PROCEDURE — 20600001 HC STEP DOWN PRIVATE ROOM

## 2017-10-31 PROCEDURE — 25000003 PHARM REV CODE 250: Performed by: STUDENT IN AN ORGANIZED HEALTH CARE EDUCATION/TRAINING PROGRAM

## 2017-10-31 PROCEDURE — 99233 SBSQ HOSP IP/OBS HIGH 50: CPT | Mod: 24,,, | Performed by: NURSE PRACTITIONER

## 2017-10-31 PROCEDURE — 25000003 PHARM REV CODE 250: Performed by: NURSE PRACTITIONER

## 2017-10-31 PROCEDURE — 36415 COLL VENOUS BLD VENIPUNCTURE: CPT

## 2017-10-31 PROCEDURE — 97116 GAIT TRAINING THERAPY: CPT

## 2017-10-31 PROCEDURE — 80197 ASSAY OF TACROLIMUS: CPT

## 2017-10-31 PROCEDURE — 63600175 PHARM REV CODE 636 W HCPCS: Performed by: STUDENT IN AN ORGANIZED HEALTH CARE EDUCATION/TRAINING PROGRAM

## 2017-10-31 RX ORDER — ONDANSETRON 8 MG/1
8 TABLET, ORALLY DISINTEGRATING ORAL EVERY 8 HOURS PRN
Status: DISCONTINUED | OUTPATIENT
Start: 2017-10-31 | End: 2017-11-08 | Stop reason: HOSPADM

## 2017-10-31 RX ORDER — HYDROMORPHONE HYDROCHLORIDE 1 MG/ML
0.5 INJECTION, SOLUTION INTRAMUSCULAR; INTRAVENOUS; SUBCUTANEOUS ONCE
Status: COMPLETED | OUTPATIENT
Start: 2017-10-31 | End: 2017-10-31

## 2017-10-31 RX ORDER — HYDROMORPHONE HYDROCHLORIDE 1 MG/ML
INJECTION, SOLUTION INTRAMUSCULAR; INTRAVENOUS; SUBCUTANEOUS
Status: DISPENSED
Start: 2017-10-31 | End: 2017-11-01

## 2017-10-31 RX ORDER — TACROLIMUS 1 MG/1
3 CAPSULE ORAL 2 TIMES DAILY
Status: DISCONTINUED | OUTPATIENT
Start: 2017-10-31 | End: 2017-11-02

## 2017-10-31 RX ADMIN — DOCUSATE SODIUM 100 MG: 100 CAPSULE, LIQUID FILLED ORAL at 09:10

## 2017-10-31 RX ADMIN — TACROLIMUS 3 MG: 1 CAPSULE ORAL at 06:10

## 2017-10-31 RX ADMIN — POLYETHYLENE GLYCOL 3350 17 G: 17 POWDER, FOR SOLUTION ORAL at 09:10

## 2017-10-31 RX ADMIN — HEPARIN SODIUM 5000 UNITS: 5000 INJECTION, SOLUTION INTRAVENOUS; SUBCUTANEOUS at 12:10

## 2017-10-31 RX ADMIN — ACETAMINOPHEN 500 MG: 500 TABLET ORAL at 06:10

## 2017-10-31 RX ADMIN — SODIUM BICARBONATE 650 MG TABLET 1300 MG: at 08:10

## 2017-10-31 RX ADMIN — SIMETHICONE CHEW TAB 80 MG 80 MG: 80 TABLET ORAL at 10:10

## 2017-10-31 RX ADMIN — TACROLIMUS 3 MG: 1 CAPSULE ORAL at 09:10

## 2017-10-31 RX ADMIN — DOCUSATE SODIUM 100 MG: 100 CAPSULE, LIQUID FILLED ORAL at 06:10

## 2017-10-31 RX ADMIN — SODIUM BICARBONATE 650 MG TABLET 1300 MG: at 09:10

## 2017-10-31 RX ADMIN — ERGOCALCIFEROL 50000 UNITS: 1.25 CAPSULE ORAL at 09:10

## 2017-10-31 RX ADMIN — ATOVAQUONE 1500 MG: 750 SUSPENSION ORAL at 09:10

## 2017-10-31 RX ADMIN — ONDANSETRON 8 MG: 8 TABLET, ORALLY DISINTEGRATING ORAL at 12:10

## 2017-10-31 RX ADMIN — SIMETHICONE CHEW TAB 80 MG 80 MG: 80 TABLET ORAL at 07:10

## 2017-10-31 RX ADMIN — FAMOTIDINE 20 MG: 20 TABLET, FILM COATED ORAL at 08:10

## 2017-10-31 RX ADMIN — HYDROMORPHONE HYDROCHLORIDE 0.5 MG: 1 INJECTION, SOLUTION INTRAMUSCULAR; INTRAVENOUS; SUBCUTANEOUS at 12:10

## 2017-10-31 RX ADMIN — DOCUSATE SODIUM 100 MG: 100 CAPSULE, LIQUID FILLED ORAL at 12:10

## 2017-10-31 RX ADMIN — HEPARIN SODIUM 5000 UNITS: 5000 INJECTION, SOLUTION INTRAVENOUS; SUBCUTANEOUS at 05:10

## 2017-10-31 RX ADMIN — ACETAMINOPHEN 500 MG: 500 TABLET ORAL at 05:10

## 2017-10-31 RX ADMIN — MYCOPHENOLATE MOFETIL 1000 MG: 250 CAPSULE ORAL at 08:10

## 2017-10-31 RX ADMIN — FLUCONAZOLE 200 MG: 200 TABLET ORAL at 09:10

## 2017-10-31 RX ADMIN — MYCOPHENOLATE MOFETIL 1000 MG: 250 CAPSULE ORAL at 09:10

## 2017-10-31 RX ADMIN — PREDNISONE 20 MG: 10 TABLET ORAL at 09:10

## 2017-10-31 RX ADMIN — OXYCODONE HYDROCHLORIDE 5 MG: 5 TABLET ORAL at 02:10

## 2017-10-31 RX ADMIN — OXYCODONE HYDROCHLORIDE 5 MG: 5 TABLET ORAL at 09:10

## 2017-10-31 RX ADMIN — HEPARIN SODIUM 5000 UNITS: 5000 INJECTION, SOLUTION INTRAVENOUS; SUBCUTANEOUS at 08:10

## 2017-10-31 RX ADMIN — BISACODYL 10 MG: 5 TABLET, COATED ORAL at 09:10

## 2017-10-31 RX ADMIN — ACETAMINOPHEN 500 MG: 500 TABLET ORAL at 12:10

## 2017-10-31 RX ADMIN — OXYCODONE HYDROCHLORIDE 5 MG: 5 TABLET ORAL at 07:10

## 2017-10-31 NOTE — ASSESSMENT & PLAN NOTE
- Pt reports minimal UOP yesterday  - Nephrology following.  Apprec recs.  - Tolerated HD 10/30/17- tolerated well.  - Permcath placed 10/30/17  - Set up for outpt HD

## 2017-10-31 NOTE — ASSESSMENT & PLAN NOTE
- LFTs/enzymes trended down nicely  - Cont bowel regimen.  - Transitioned pain control to scheduled acetaminophen + Tramadol as hallucinations with oxycodone- tolerating well.  - Liver US POD 5 with elevated RIs and slowed flow HA system.  US 10/23 technically difficult exam w difficulty visualizing arterial system and slow flow w no tardus parvus.  Will monitor labs and repeat prior to discharge.    - Wound vac placed 10/29

## 2017-10-31 NOTE — SUBJECTIVE & OBJECTIVE
Interval History:   Patient evaluated at bedside, afebrile, hemodynamically stable, no respiratory distress, intake 1.2 L and output 3.2 L, las dialysis was yesterday and tolerated well     Review of patient's allergies indicates:   Allergen Reactions    Bactrim [sulfamethoxazole-trimethoprim] Other (See Comments)     Mookie Trell Syndrome     Current Facility-Administered Medications   Medication Frequency    0.9%  NaCl infusion PRN    acetaminophen tablet 500 mg Q6H    atovaquone suspension 1,500 mg Daily    bisacodyl EC tablet 10 mg Daily    bisacodyl suppository 10 mg Once    dextrose 50% injection 12.5 g PRN    dextrose 50% injection 25 g PRN    docusate sodium capsule 100 mg TID PC    ergocalciferol capsule 50,000 Units Q7 Days    famotidine tablet 20 mg QHS    fluconazole tablet 200 mg Daily    glucagon (human recombinant) injection 1 mg PRN    glucose chewable tablet 16 g PRN    glucose chewable tablet 24 g PRN    heparin (porcine) injection 1,000 Units PRN    heparin (porcine) injection 5,000 Units Q8H    HYDROmorphone (DILAUDID) 1 mg/mL injection     HYDROmorphone injection 0.2 mg Once    insulin aspart pen 0-5 Units QID (AC + HS) PRN    mycophenolate capsule 1,000 mg BID    ondansetron disintegrating tablet 8 mg Q8H PRN    oxyCODONE immediate release tablet 5 mg Q6H PRN    polyethylene glycol packet 17 g Daily    predniSONE tablet 20 mg Daily    simethicone chewable tablet 80 mg TID PRN    sodium bicarbonate tablet 1,300 mg BID    tacrolimus capsule 2 mg Daily    tacrolimus capsule 3 mg Daily    traMADol tablet 50 mg Q4H PRN    valganciclovir 50 mg/ml oral solution 100 mg Every Mon, Wed, Fri       Objective:     Vital Signs (Most Recent):  Temp: 98.3 °F (36.8 °C) (10/31/17 1241)  Pulse: 103 (10/31/17 1241)  Resp: 16 (10/31/17 1241)  BP: 119/68 (10/31/17 1241)  SpO2: 99 % (10/31/17 1241)  O2 Device (Oxygen Therapy): room air (10/31/17 1241) Vital Signs (24h Range):  Temp:   [98.1 °F (36.7 °C)-99.1 °F (37.3 °C)] 98.3 °F (36.8 °C)  Pulse:  [] 103  Resp:  [16-18] 16  SpO2:  [99 %-100 %] 99 %  BP: (108-131)/(57-84) 119/68     Weight: 77.5 kg (170 lb 12.8 oz) (10/31/17 0300)  Body mass index is 26.75 kg/m².  Body surface area is 1.91 meters squared.    I/O last 3 completed shifts:  In: 1480 [P.O.:880; Other:600]  Out: 3330 [Urine:130; Other:3200]    Physical Exam   Constitutional: He is oriented to person, place, and time. He appears well-developed and well-nourished. No distress.   HENT:   Head: Normocephalic and atraumatic.   Eyes: EOM are normal. Scleral icterus is present.   Cardiovascular: Normal rate and regular rhythm.    Pulmonary/Chest: Effort normal and breath sounds normal. He has no rales.   Abdominal: Soft. He exhibits distension.   Musculoskeletal: He exhibits edema. He exhibits no deformity.   Neurological: He is alert and oriented to person, place, and time.   Skin: He is not diaphoretic.       Significant Labs:  ABGs: No results for input(s): PH, PCO2, HCO3, POCSATURATED, BE in the last 168 hours.  BMP:   Recent Labs  Lab 10/31/17  0456   GLU 72      CO2 22*   BUN 27*   CREATININE 3.0*   CALCIUM 8.4*   MG 1.9     CBC:   Recent Labs  Lab 10/31/17  0456   WBC 8.54   RBC 2.60*   HGB 8.1*   HCT 23.8*   *   MCV 92   MCH 31.2*   MCHC 34.0     CMP:   Recent Labs  Lab 10/31/17  0456   GLU 72   CALCIUM 8.4*   ALBUMIN 2.7*   PROT 4.7*      K 3.4*   CO2 22*      BUN 27*   CREATININE 3.0*   ALKPHOS 165*   ALT 7*   AST 19   BILITOT 3.0*     All labs within the past 24 hours have been reviewed.

## 2017-10-31 NOTE — PLAN OF CARE
Problem: Occupational Therapy Goal  Goal: Occupational Therapy Goal  Goals to be met by:  11/10/17    Patient will increase functional independence with ADLs by performing:    UE Dressing with Minimal Assistance. MET (10/29/2017)  LE Dressing with Moderate Assistance with AD as needed. - MET 10/31  Grooming while standing at sink with Minimal Assistance.  Toileting from toilet with Minimal Assistance for hygiene and clothing management.   Supine to sit with Moderate Assistance.  Toilet transfer to toilet with Minimal Assistance.  Upper extremity exercise program per handout, with independence.                  Outcome: Ongoing (interventions implemented as appropriate)  Goals revised and updated as needed.   1 goal met this date.   Continue POC     Samantha Lopez OT  10/31/2017

## 2017-10-31 NOTE — PLAN OF CARE
Problem: Patient Care Overview  Goal: Plan of Care Review  Outcome: Ongoing (interventions implemented as appropriate)  Patient AAOx4 and VSS. He has remained afebrile. He has a chevron incision that's ANDRZEJ w/staples and the left side has a wound vac. The wound vac dressing was changed today by the wound care nurse and is to be changed again on 11/3. It's to -125 suction continuously. He has a right chest wall perm cath that he gets dialysis through and the dressing is dry, clean, and intact. He's able to ambulate with one person assist and has experienced no falls today. His mom pulls his self meds 100% and has remained at the bedside all day today. His urine output so far today has been 225 mL. He has no questions or concerns at this time. Patient stable and will continue to monitor.

## 2017-10-31 NOTE — PROGRESS NOTES
[facility]  Nephrology  Progress Note    Patient Name: Jhonny Diana  MRN: [id number]  Admission Date: 9/27/2017  Hospital Length of Stay: 34 days  Attending Provider: Solis Chapa MD   Primary Care Physician: Provider Notinsystem  Principal Problem:Liver replaced by transplant    Subjective:     HPI: Jhonny Diana is a 26 yo male with pMHX of childhood asthma and alcohol abuse who was was [place] [facility] [place] for worsening liver functions.  Information was obtained from family at bedside and review of outside records, given that patient is obtunded.  According to mother at bedside, patient has had multiple admissions to the hospital over the past 6 months for recurrent abdominal pain.  She reports that he has been been seen the ED and has been diagnosed with diverticulitis, UTIs, gastroenteritis, and cholecystitis.  He was never admitted to the hospital and was always discharged.  On 09/06, patient presented to OSH for worsening abdominal pain and distension.  He was diagnosed at that time with ETOH hepatitis.  He was treated with steroids, ceftriaxone for SBP prophylaxis, and diuresed at that time for volume management.  He was discharged home on the 15th, after clearance from GI,a nd was instructed to follow-up with his PCP 1 week after discharge.  He presented to the hospital on 09/25 for worsening abdominal pain and distension with 20 kg weight gain.  He was [place] [facility] [place] and Nephrology was consulted for LAURA.    On review of outside records, patient Scr on admission was 1.5 (with baseline at 1.0).  According to the family at the bedside, he was not urinating well at the OSH, and not responding to diuretics (spirionolactone/lasix combo).  His mother reports that patient continued to c/o abdominal and inability to urinate, but no chicas was placed until transfer to [facility] [place].  Record reviews shows relative hypotension with BPs in the low 90's.  His  mother reports that the nursing staff was administrating his narcotic regimen based on his blood pressures, and at times, medication was held due to hypotension.  Since admission to hospital, his UOP has improved, responding well in Lasix 40 IV (770 ml since admission, 700 ml so far this shift at time of consultation).  Admit labs revealed a SCr of 2.6, this morning improved down to 2.4.  Bilirubin remains elevated, >14.  Abdomen remains with tight distension, unable to perform paracentesis, as no significant fluid pocket found.  Mother reports patient did not have para's done at OSH.    Labs on the morning of 09/27 with bicarb of 15.  ABG with alkalemia and primary respiratory alkalosis and anion gap acidosis.     Interval History:   Patient evaluated at bedside, afebrile, hemodynamically stable, no respiratory distress, intake 1.2 L and output 3.2 L, las dialysis was yesterday and tolerated well     Review of patient's allergies indicates:   Allergen Reactions    Bactrim [sulfamethoxazole-trimethoprim] Other (See Comments)     Mookie Trell Syndrome     Current Facility-Administered Medications   Medication Frequency    0.9%  NaCl infusion PRN    acetaminophen tablet 500 mg Q6H    atovaquone suspension 1,500 mg Daily    bisacodyl EC tablet 10 mg Daily    bisacodyl suppository 10 mg Once    dextrose 50% injection 12.5 g PRN    dextrose 50% injection 25 g PRN    docusate sodium capsule 100 mg TID PC    ergocalciferol capsule 50,000 Units Q7 Days    famotidine tablet 20 mg QHS    fluconazole tablet 200 mg Daily    glucagon (human recombinant) injection 1 mg PRN    glucose chewable tablet 16 g PRN    glucose chewable tablet 24 g PRN    heparin (porcine) injection 1,000 Units PRN    heparin (porcine) injection 5,000 Units Q8H    HYDROmorphone (DILAUDID) 1 mg/mL injection     HYDROmorphone injection 0.2 mg Once    insulin aspart pen 0-5 Units QID (AC + HS) PRN    mycophenolate capsule 1,000 mg BID     ondansetron disintegrating tablet 8 mg Q8H PRN    oxyCODONE immediate release tablet 5 mg Q6H PRN    polyethylene glycol packet 17 g Daily    predniSONE tablet 20 mg Daily    simethicone chewable tablet 80 mg TID PRN    sodium bicarbonate tablet 1,300 mg BID    tacrolimus capsule 2 mg Daily    tacrolimus capsule 3 mg Daily    traMADol tablet 50 mg Q4H PRN    valganciclovir 50 mg/ml oral solution 100 mg Every Mon, Wed, Fri       Objective:     Vital Signs (Most Recent):  Temp: 98.3 °F (36.8 °C) (10/31/17 1241)  Pulse: 103 (10/31/17 1241)  Resp: 16 (10/31/17 1241)  BP: 119/68 (10/31/17 1241)  SpO2: 99 % (10/31/17 1241)  O2 Device (Oxygen Therapy): room air (10/31/17 1241) Vital Signs (24h Range):  Temp:  [98.1 °F (36.7 °C)-99.1 °F (37.3 °C)] 98.3 °F (36.8 °C)  Pulse:  [] 103  Resp:  [16-18] 16  SpO2:  [99 %-100 %] 99 %  BP: (108-131)/(57-84) 119/68     Weight: 77.5 kg (170 lb 12.8 oz) (10/31/17 0300)  Body mass index is 26.75 kg/m².  Body surface area is 1.91 meters squared.    I/O last 3 completed shifts:  In: 1480 [P.O.:880; Other:600]  Out: 3330 [Urine:130; Other:3200]    Physical Exam   Constitutional: He is oriented to person, place, and time. He appears well-developed and well-nourished. No distress.   HENT:   Head: Normocephalic and atraumatic.   Eyes: EOM are normal. Scleral icterus is present.   Cardiovascular: Normal rate and regular rhythm.    Pulmonary/Chest: Effort normal and breath sounds normal. He has no rales.   Abdominal: Soft. He exhibits distension.   Musculoskeletal: He exhibits edema. He exhibits no deformity.   Neurological: He is alert and oriented to person, place, and time.   Skin: He is not diaphoretic.       Significant Labs:  ABGs: No results for input(s): PH, PCO2, HCO3, POCSATURATED, BE in the last 168 hours.  BMP:   Recent Labs  Lab 10/31/17  0456   GLU 72      CO2 22*   BUN 27*   CREATININE 3.0*   CALCIUM 8.4*   MG 1.9     CBC:   Recent Labs  Lab 10/31/17  0456    WBC 8.54   RBC 2.60*   HGB 8.1*   HCT 23.8*   *   MCV 92   MCH 31.2*   MCHC 34.0     CMP:   Recent Labs  Lab 10/31/17  0456   GLU 72   CALCIUM 8.4*   ALBUMIN 2.7*   PROT 4.7*      K 3.4*   CO2 22*      BUN 27*   CREATININE 3.0*   ALKPHOS 165*   ALT 7*   AST 19   BILITOT 3.0*     All labs within the past 24 hours have been reviewed.       Assessment/Plan:     LAURA (acute kidney injury)    - likely ischemic ATN from decreased renal perfusion (volume shifts from paracentesis, anemia). Urine sediment with muddy brown casts  - underwent OLTx on 10/19; received intraop CRRT  - remains dialysis dependent at this time  - currently oliguric  - last HD on Saturday  - planning for perm cath today  - will arrange for HD tomorrow   - Still anuric   - Continue with intake and output monitoring   - next treatment tentatively planned for Wednesday             Win Sandoval  Nephrology  Fellow  [facility]    Pager [phone]      ATTENDING PHYSICIAN ATTESTATION  I have personally interviewed and examined the patient. I thoroughly reviewed the demographic, clinical, laboratorial and imaging information available in medical records. I agree with the assessment and recommendations provided by the subspecialty resident. Dr. Gentile was under my supervision.

## 2017-10-31 NOTE — PROGRESS NOTES
EZEKIEL Update:    SW received call from pt's mother who reports she is having some concerns about being able to assist pt physically. Mother reports she is nervous thinking about getting pt in and out of a car when he discharges and inquired if their insurance would pay for transportation. SW explained that their particular insurance does not pay for transportation. SW explained that at this time, team does not have a definite discharge date and is unsure of the final decision from therapy to whether pt will discharge to apartment or inpatient rehab facility. EZEKIEL explained in the event pt discharges to inpatient rehab, the rehab facility will be responsible for transporting pt to HD and all post-transplant clinic appointments. SW if he is discharged directly to the apartments he will be set up with HH services to continue working on pt's strength. EZEKIEL also explained pt can be provided a wc to use when in the hospital or coming from the parking garage etc when coming to follow up appointments. SW also explained if pt is sent home with DME she will be taught how to properly use it. Mother felt relieved after hearing this.    EZEKIEL received call from [facility] explaining pt has been approved for HD at [facility] @ 3:00 and can start as soon as Friday. EZEKIEL will contact [facility] clinic manager [place] tomorrow when SW receives a tentative discharge date.     EZEKIEL is still waiting on note to be placed from wound care in order for EZEKIEL to send wound vac referral form to [facility]. SAMREEN Green reports she has measurements. EZEKIEL will fax referral once wound vac note is documented. No other issues or concerns noted at this time. EZEKIEL remains available for education, resources, support, and discharge planning as appropriate.

## 2017-10-31 NOTE — PT/OT/SLP PROGRESS
"Occupational Therapy  Treatment    Jhonny Diana   MRN: [id number]   Admitting Diagnosis: Liver replaced by transplant    OT Date of Treatment: 10/31/17   OT Start Time: 0912  OT Stop Time: 0952  OT Total Time (min): 40 min    Billable Minutes:  Self Care/Home Management 15 and Therapeutic Activity 23    General Precautions: Standard, fall  Orthopedic Precautions: N/A  Braces: N/A       Subjective:  Communicated with RN prior to session.  Pt's mother stated, " I'm really worried about how I'm supposed to help get him in and out of a car?"   Pt stated, " After dialysis I'm wiped out, how is my mom gonna be able to help me?"   Pain/Comfort  Pain Rating 1: 7/10  Location - Side 1: Bilateral  Location - Orientation 1: generalized  Location 1: abdomen  Pain Addressed 1: Reposition, Distraction  Pain Rating Post-Intervention 1: 7/10    Objective:  Patient found with: telemetry, wound vac     Functional Mobility:  Bed Mobility:  Supine to Sit:  (NP; pt found sitting UIC )  Sit to Supine:  (NP; Pt left sitting UIC )    Transfers:   Sit <> Stand Assistance: Moderate Assistance, Stand By Assistance (x 3 trials from various surfaces with mod A sit > stand and SBA for stand > sit. )  Sit <> Stand Assistive Device: Rolling Walker  Toilet Transfer Technique: Stand Pivot  Toilet Transfer Assistance: Moderate Assistance, Stand By Assistance (SBA from stand > sit and mod A for sit > stand. V/c's provided for hand placement and AD management )  Toilet Transfer Assistive Device: grab bar, Rolling Walker    Functional Ambulation: Abdominal binder donned prior to OOB ax. Pt performed functional mobility from bedside chair <>bathroom with SBA using RW, increased time to ambulate OOB required due to pain.       Activities of Daily Living:     UE Dressing Level of Assistance: Minimum assistance (Pt required min A to bring gown around back )    LE Dressing Level of Assistance: Set-up Assistance (Pt able to bring B LEs into crossed leg position " sitting UIC and don/doff B  socks )        Toileting Where Assessed: Toilet  Toileting Level of Assistance: Minimum assistance     Balance:   Static Sit: FAIR+: Able to take MINIMAL challenges from all directions  Dynamic Sit: FAIR+: Maintains balance through MINIMAL excursions of active trunk motion  Static Stand: FAIR: Maintains without assist but unable to take challenges  Dynamic stand: FAIR: Needs CONTACT GUARD during gait    Therapeutic Activities and Exercises:  Pt educated by therapist on:   - Pt educated on role of OT, POC, and goals for therapy.    -  Patient agreed to participate in session.   - Patient and family aware of patient's deficits and therapy progression.   -Educated pt on being appropriate to transfer with nsg and PCT  - Time provided for therapeutic counseling and discussion of health disposition.   - Importance of OOB ax's with staff member assistance and sitting OOB majority of day.   - Pt completed ADLs and functional mobility for treatment session as noted above   - Therapist educated pt and wife on transfer techniques for getting in/out of car transfers.  - Pt performed x3 functional sit<>stands.    - Pt insructed to perform x 10 bridging exercises supine in bed and x10 functional sit <>stands daily in order to strengthen  BLEs and improved functional strength.   - Pt verbalized understanding. Pt expressed no further concerns/questions.  - whiteboard updated     AM-PAC 6 CLICK ADL   How much help from another person does this patient currently need?   1 = Unable, Total/Dependent Assistance  2 = A lot, Maximum/Moderate Assistance  3 = A little, Minimum/Contact Guard/Supervision  4 = None, Modified [place]/Independent    Putting on and taking off regular lower body clothing? : 3  Bathing (including washing, rinsing, drying)?: 2  Toileting, which includes using toilet, bedpan, or urinal? : 3  Putting on and taking off regular upper body clothing?: 3  Taking care of personal  "grooming such as brushing teeth?: 4  Eating meals?: 4  Total Score: 19     AM-PAC Raw Score CMS "G-Code Modifier Level of Impairment Assistance   6 % Total / Unable   7 - 8 CM 80 - 100% Maximal Assist   9-13 CL 60 - 80% Moderate Assist   14 - 19 CK 40 - 60% Moderate Assist   20 - 22 CJ 20 - 40% Minimal Assist   23 CI 1-20% SBA / CGA   24 CH 0% Independent/ Mod I       Patient left up in chair with all lines intact, call button in reach, RN notified and MDs  present    ASSESSMENT:  Jhonny Diana is a 27 y.o. male with a medical diagnosis of Liver replaced by transplant and presents with the below stated deficits. Pt tolerated session fair. Pt will continue to benefit from skilled OT in order to maximize pt's safety and (I) with functional mobility and self-care tasks. Anticipate d/c home with HH.     Rehab identified problem list/impairments: Rehab identified problem list/impairments: weakness, impaired endurance, impaired self care skills, impaired functional mobilty, gait instability, pain, decreased ROM, edema    Rehab potential is good.    Activity tolerance: Good    Discharge recommendations: Discharge Facility/Level Of Care Needs: home with home health     Barriers to discharge: Barriers to Discharge: None    Equipment recommendations: walker, rolling     GOALS:    Occupational Therapy Goals        Problem: Occupational Therapy Goal    Goal Priority Disciplines Outcome Interventions   Occupational Therapy Goal     OT, PT/OT Ongoing (interventions implemented as appropriate)    Description:  Goals to be met by:  11/10/17    Patient will increase functional independence with ADLs by performing:    UE Dressing with Minimal Assistance. MET (10/29/2017)  LE Dressing with Moderate Assistance with AD as needed. - MET 10/31  Grooming while standing at sink with Minimal Assistance.  Toileting from toilet with Minimal Assistance for hygiene and clothing management.   Supine to sit with Moderate Assistance.  Toilet " transfer to toilet with Minimal Assistance.  Upper extremity exercise program per handout, with independence.                                    Plan:  Patient to be seen 5 x/week to address the above listed problems via self-care/home management, therapeutic activities, therapeutic exercises  Plan of Care expires: 11/26/17  Plan of Care reviewed with: patient, parent         Samantha Lopez, OT  10/31/2017

## 2017-10-31 NOTE — SUBJECTIVE & OBJECTIVE
Scheduled Meds:   acetaminophen  500 mg Oral Q6H    atovaquone  1,500 mg Oral Daily    bisacodyl  10 mg Oral Daily    bisacodyl  10 mg Rectal Once    docusate sodium  100 mg Oral TID PC    ergocalciferol  50,000 Units Oral Q7 Days    famotidine  20 mg Oral QHS    fluconazole  200 mg Oral Daily    heparin (porcine)  5,000 Units Subcutaneous Q8H    HYDROmorphone        HYDROmorphone  0.2 mg Intravenous Once    mycophenolate  1,000 mg Oral BID    polyethylene glycol  17 g Oral Daily    predniSONE  20 mg Oral Daily    sodium bicarbonate  1,300 mg Oral BID    tacrolimus  3 mg Oral BID    valganciclovir 50 mg/ml  100 mg Oral Every Mon, Wed, Fri     Continuous Infusions:   PRN Meds:sodium chloride 0.9%, dextrose 50%, dextrose 50%, glucagon (human recombinant), glucose, glucose, heparin (porcine), insulin aspart, ondansetron, oxyCODONE, simethicone, traMADol    Review of Systems   Constitutional: Positive for activity change, appetite change and fatigue. Negative for diaphoresis and fever.   HENT: Negative for mouth sores.    Respiratory: Negative for cough, choking, shortness of breath and wheezing.    Cardiovascular: Positive for leg swelling. Negative for chest pain and palpitations.   Gastrointestinal: Positive for abdominal distention. Negative for abdominal pain, constipation, diarrhea, nausea and vomiting.   Genitourinary: Positive for decreased urine volume. Negative for difficulty urinating.   Musculoskeletal: Negative for arthralgias, back pain and joint swelling.   Skin: Positive for wound.   Allergic/Immunologic: Positive for immunocompromised state.   Neurological: Negative for tremors, weakness and headaches.   Hematological: Bruises/bleeds easily.   Psychiatric/Behavioral: Negative for agitation, confusion, dysphoric mood, hallucinations and sleep disturbance. The patient is nervous/anxious.    All other systems reviewed and are negative.    Objective:     Vital Signs (Most Recent):  Temp:  98.3 °F (36.8 °C) (10/31/17 1241)  Pulse: 103 (10/31/17 1241)  Resp: 16 (10/31/17 1241)  BP: 119/68 (10/31/17 1241)  SpO2: 99 % (10/31/17 1241) Vital Signs (24h Range):  Temp:  [98.1 °F (36.7 °C)-99.1 °F (37.3 °C)] 98.3 °F (36.8 °C)  Pulse:  [] 103  Resp:  [16-18] 16  SpO2:  [99 %-100 %] 99 %  BP: (108-129)/(57-72) 119/68     Weight: 77.5 kg (170 lb 12.8 oz)  Body mass index is 26.75 kg/m².    Intake/Output - Last 3 Shifts       10/29 0700 - 10/30 0659 10/30 0700 - 10/31 0659 10/31 0700 - 11/01 0659    P.O. 770 600 460    Other  600     Total Intake(mL/kg) 770 (9.7) 1200 (15.5) 460 (5.9)    Urine (mL/kg/hr) 205 (0.1)  225 (0.4)    Other  3200 (1.7) 250 (0.4)    Stool 0 (0) 0 (0) 0 (0)    Total Output 205 3200 475    Net +565 -2000 -15           Urine Occurrence 0 x 0 x     Stool Occurrence 0 x 1 x 0 x          Physical Exam   Constitutional: He is oriented to person, place, and time. He appears well-developed. No distress.   Temporal and distal extremity muscle wasting   HENT:   Head: Normocephalic and atraumatic.   Mouth/Throat: No oropharyngeal exudate.   Eyes: EOM are normal. Pupils are equal, round, and reactive to light. Scleral icterus is present.   Neck: Normal range of motion. Neck supple.   Cardiovascular: Normal rate, regular rhythm and normal heart sounds.  Exam reveals no gallop and no friction rub.    No murmur heard.  Pulmonary/Chest: Effort normal. No respiratory distress. He has no rales.   Dec R base   Abdominal: Soft. Bowel sounds are normal. He exhibits distension. There is no tenderness.   Chevron inc with wound vac to R lateral side x 7 cm  Serosang output   Musculoskeletal: Normal range of motion. He exhibits edema (1-2+ generalized).   Neurological: He is alert and oriented to person, place, and time.   Skin: Skin is warm and dry. He is not diaphoretic.   jaundiced   Psychiatric: He has a normal mood and affect. His behavior is normal. Judgment and thought content normal.   Nursing note  and vitals reviewed.    Laboratory:  Immunosuppressants         Stop Route Frequency     tacrolimus capsule 3 mg      -- Oral 2 times daily     mycophenolate capsule 1,000 mg      -- Oral 2 times daily        CBC:     Recent Labs  Lab 10/31/17  0456   WBC 8.54   RBC 2.60*   HGB 8.1*   HCT 23.8*   *   MCV 92   MCH 31.2*   MCHC 34.0     CMP:     Recent Labs  Lab 10/31/17  0456   GLU 72   CALCIUM 8.4*   ALBUMIN 2.7*   PROT 4.7*      K 3.4*   CO2 22*      BUN 27*   CREATININE 3.0*   ALKPHOS 165*   ALT 7*   AST 19   BILITOT 3.0*     Labs within the past 24 hours have been reviewed.    Diagnostic Results:  I have personally reviewed all pertinent imaging studies.

## 2017-10-31 NOTE — PROGRESS NOTES
[facility]  Liver Transplant  Progress Note    Patient Name: Jhonny Diana  MRN: [id number]  Admission Date: 2017  Hospital Length of Stay: 34 days  Code Status: Full Code  Primary Care Provider: Provider Notinsystem  Post-Operative Day: 12    ORGAN:   LIVER  Disease Etiology: Acute Alcoholic Hepatitis  Donor Type:    - Brain Death  CDC High Risk:   No  Donor CMV Status:   Donor CMV Status: Positive  Donor HBcAB:   Negative  Donor HCV Status:   Negative  Whole or Partial: Whole Liver  Biliary Anastomosis: End to End  Arterial Anatomy: Standard  Subjective:     History of Present Illness:  Jhonny Diana is a 26 yo male with hx of childhood asthma and heavy alcohol abuse (fifth of liquor daily since teenager, last drink 2.5 wks ago per mother) who was [place] [facility] [place] higher level of care/liver transplant evaluation. Patient initially presented to OSH for a 2 mo hx of progressively worsening abdominal pain with associated fatigue, jaundice, and abdominal distention with ~20# weight gain.  Reports family hx of alcoholic cirrhosis in father. Pt was recently diagnosed with alcoholic hepatitis during his admission at OSH on -9/15. During that hospitalization, patient was given ceftriaxone for SBP ppx, prednisone for alcoholic hepatitis, and diuresed. Pt was discharged and instructed to follow up with PCP and AA within 1 wk. After his discharge, patient returned to ED 4 days after for increased weight gain and worsening abdominal pain/jaundice.     Mr. Diana was admitted to the CMICU on  for decompensated alcoholic hepatitis with high MELD score, severe HE, and LAURA. Hepatology consulted and followed to evaluate liver transplant candidacy. Pt started on albumin and lasix for LAURA and possibility for HRS. However, nephrology felt that LAURA is likely multifactorial: ATN given hypotensive episodes at OSH and toxic given severe hyperbilirubinemia; not truly HRS. Lactulose/rifaximin started  for HE and mental status slowly improved. Mr. Diana with significant ABD pain since admission with ABD distension. CT abdomen was significant for pericolonic fat stranding of right colon; no obstruction. Also with diffuse mesenteric edema. No significant ascites noted, no cholecystitis/cholelithiasis/ductal dilation. US liver with doppler showed appropriate vasculature.  He was stepped down to Hospital Medicine on 10/5.  Paracentesis was done on 10/9 with 3.3L removed and 10/12 with 1.8 removed, no infection. His diet was advanced and Dietary was consulted to help get adequate caloric intake. Listed for liver transplant 10/17 with MELD 36.    Pt received organ offer and underwent liver txp 10/19 which took place without complication.  Steroid induction used, CMV +/+.  Post operatively, LFTs/enzymes trended down nicely.  POD 5 U/S with elevated RIs and sluggish flow in HA system, otherwise ok.  Pt with LAURA prior to and s/p txp requiring dialysis.  He is tolerating HD with last HD 10/26.      Hospital Course:  Interval hx:  No acute events overnight.  Tolerated HD yesterday.  Made 50 cc urine today.  Chevron w wound vac(originally opened 10/27, vac placed 10/29), per wound care, undermining to left at least 8 cm.  Wd care packed w sponge and applied wd vac.  Wound bed w slough, difficult to debride giving increased pain.  May need to consider OR to debride/open.  Cont PRN pain meds.  Permcath placed 10/30.  Pt's mom reports intermittent delirium/emotional lability still occurring day and night as well as she is concerned regading transition to apt/not ready for discharge yet.      Scheduled Meds:   acetaminophen  500 mg Oral Q6H    atovaquone  1,500 mg Oral Daily    bisacodyl  10 mg Oral Daily    bisacodyl  10 mg Rectal Once    docusate sodium  100 mg Oral TID PC    ergocalciferol  50,000 Units Oral Q7 Days    famotidine  20 mg Oral QHS    fluconazole  200 mg Oral Daily    heparin (porcine)  5,000 Units  Subcutaneous Q8H    HYDROmorphone        HYDROmorphone  0.2 mg Intravenous Once    mycophenolate  1,000 mg Oral BID    polyethylene glycol  17 g Oral Daily    predniSONE  20 mg Oral Daily    sodium bicarbonate  1,300 mg Oral BID    tacrolimus  3 mg Oral BID    valganciclovir 50 mg/ml  100 mg Oral Every Mon, Wed, Fri     Continuous Infusions:   PRN Meds:sodium chloride 0.9%, dextrose 50%, dextrose 50%, glucagon (human recombinant), glucose, glucose, heparin (porcine), insulin aspart, ondansetron, oxyCODONE, simethicone, traMADol    Review of Systems   Constitutional: Positive for activity change, appetite change and fatigue. Negative for diaphoresis and fever.   HENT: Negative for mouth sores.    Respiratory: Negative for cough, choking, shortness of breath and wheezing.    Cardiovascular: Positive for leg swelling. Negative for chest pain and palpitations.   Gastrointestinal: Positive for abdominal distention. Negative for abdominal pain, constipation, diarrhea, nausea and vomiting.   Genitourinary: Positive for decreased urine volume. Negative for difficulty urinating.   Musculoskeletal: Negative for arthralgias, back pain and joint swelling.   Skin: Positive for wound.   Allergic/Immunologic: Positive for immunocompromised state.   Neurological: Negative for tremors, weakness and headaches.   Hematological: Bruises/bleeds easily.   Psychiatric/Behavioral: Negative for agitation, confusion, dysphoric mood, hallucinations and sleep disturbance. The patient is nervous/anxious.    All other systems reviewed and are negative.    Objective:     Vital Signs (Most Recent):  Temp: 98.3 °F (36.8 °C) (10/31/17 1241)  Pulse: 103 (10/31/17 1241)  Resp: 16 (10/31/17 1241)  BP: 119/68 (10/31/17 1241)  SpO2: 99 % (10/31/17 1241) Vital Signs (24h Range):  Temp:  [98.1 °F (36.7 °C)-99.1 °F (37.3 °C)] 98.3 °F (36.8 °C)  Pulse:  [] 103  Resp:  [16-18] 16  SpO2:  [99 %-100 %] 99 %  BP: (108-129)/(57-72) 119/68      Weight: 77.5 kg (170 lb 12.8 oz)  Body mass index is 26.75 kg/m².    Intake/Output - Last 3 Shifts       10/29 0700 - 10/30 0659 10/30 0700 - 10/31 0659 10/31 0700 - 11/01 0659    P.O. 770 600 460    Other  600     Total Intake(mL/kg) 770 (9.7) 1200 (15.5) 460 (5.9)    Urine (mL/kg/hr) 205 (0.1)  225 (0.4)    Other  3200 (1.7) 250 (0.4)    Stool 0 (0) 0 (0) 0 (0)    Total Output 205 3200 475    Net +565 -2000 -15           Urine Occurrence 0 x 0 x     Stool Occurrence 0 x 1 x 0 x          Physical Exam   Constitutional: He is oriented to person, place, and time. He appears well-developed. No distress.   Temporal and distal extremity muscle wasting   HENT:   Head: Normocephalic and atraumatic.   Mouth/Throat: No oropharyngeal exudate.   Eyes: EOM are normal. Pupils are equal, round, and reactive to light. Scleral icterus is present.   Neck: Normal range of motion. Neck supple.   Cardiovascular: Normal rate, regular rhythm and normal heart sounds.  Exam reveals no gallop and no friction rub.    No murmur heard.  Pulmonary/Chest: Effort normal. No respiratory distress. He has no rales.   Dec R base   Abdominal: Soft. Bowel sounds are normal. He exhibits distension. There is no tenderness.   Chevron inc with wound vac to R lateral side x 7 cm  Serosang output   Musculoskeletal: Normal range of motion. He exhibits edema (1-2+ generalized).   Neurological: He is alert and oriented to person, place, and time.   Skin: Skin is warm and dry. He is not diaphoretic.   jaundiced   Psychiatric: He has a normal mood and affect. His behavior is normal. Judgment and thought content normal.   Nursing note and vitals reviewed.    Laboratory:  Immunosuppressants         Stop Route Frequency     tacrolimus capsule 3 mg      -- Oral 2 times daily     mycophenolate capsule 1,000 mg      -- Oral 2 times daily        CBC:     Recent Labs  Lab 10/31/17  0456   WBC 8.54   RBC 2.60*   HGB 8.1*   HCT 23.8*   *   MCV 92   MCH 31.2*    MCHC 34.0     CMP:     Recent Labs  Lab 10/31/17  0456   GLU 72   CALCIUM 8.4*   ALBUMIN 2.7*   PROT 4.7*      K 3.4*   CO2 22*      BUN 27*   CREATININE 3.0*   ALKPHOS 165*   ALT 7*   AST 19   BILITOT 3.0*     Labs within the past 24 hours have been reviewed.    Diagnostic Results:  I have personally reviewed all pertinent imaging studies.    Assessment/Plan:     * Liver transplant 10/19/2017 for acute alcoholic hepatitis    - LFTs/enzymes trended down nicely  - Cont bowel regimen.  - Transitioned pain control to scheduled acetaminophen + Tramadol as hallucinations with oxycodone- tolerating well.  - Liver US POD 5 with elevated RIs and slowed flow HA system.  US 10/23 technically difficult exam w difficulty visualizing arterial system and slow flow w no tardus parvus.  Will monitor labs and repeat prior to discharge.    - Wound vac placed 10/29        LAURA (acute kidney injury)    - Pt reports minimal UOP yesterday  - Nephrology following.  Apprec recs.  - Tolerated HD 10/30/17- tolerated well.  - Permcath placed 10/30/17  - Set up for outpt HD        Alcoholism /alcohol abuse    - Psych consulted, high risk for transplant. To complete ABU program s/p transplant          Severe malnutrition    - Dietary consulted and following.   - Continue protein powder        Immunosuppression    -see IS.          Prophylactic immunotherapy    - Continue tacrolimus, prednisone taper, Cellcept.  - Will monitor for signs of toxic side effects, check daily tacrolimus troughs, and change meds accordingly.        Long-term use of immunosuppressant medication    - See prophylactic immunotherapy          At risk for opportunistic infections    - D/C Dapsone secondary to worsening anemia and continue atovaquone for PCP prophylaxis  - Continue Valcyte for CMV prophylaxis  - Continue fluconazole for fungal prophylaxis          Physical deconditioning    - Working with PT/OT  - Current rec  PT/OT          Marlen    - 2/2  liver disease, LAURA, and hypoalbuminemia  - Expect improvement once kidneys improve.    - Attempt to keep dressings dry        Acute blood loss anemia    - PRBC x 2 u 10/26 and responded nicely  - Monitor            VTE Risk Mitigation         Ordered     heparin (porcine) injection 5,000 Units  Every 8 hours     Route:  Subcutaneous        10/30/17 0703     heparin (porcine) injection 1,000 Units  As needed (PRN)     Route:  Intra-Catheter        10/30/17 1649     Place sequential compression device  Until discontinued      10/20/17 0346     High Risk of VTE  Once      10/20/17 0346          The patients clinical status was discussed at multidisplinary rounds, involving transplant surgery, transplant medicine, pharmacy, nursing, nutrition, and social work    Discharge Planning:  Monitor kidney function --> may need outpt HD  Monitor HH needs vs rehab for deconditioned status      Norma Blackwood, NP  Liver Transplant  [facility]

## 2017-10-31 NOTE — PLAN OF CARE
Problem: Patient Care Overview  Goal: Plan of Care Review  Pt aaox4 vswnl and c/o incisional pain with mild relief after pain med. Pt ambulated in jenkins with walker and 1 assist. Bed in low position and callbell within reach. Mother at bedside active in care. Rt chevron incision with staples removed and wound vac intact. Rt urostomy bag for drain at old luke site draining large amounts serous fluid. Left side chevron with staples. Mother pulling self meds 100%. Pt went to dialysis on dayshift and removed 2liters. Pt scant uop. Pt had bm on dayshift. Rt permacath placed on dayshift. Standard precautions and reverse isolation maintained.

## 2017-10-31 NOTE — PT/OT/SLP PROGRESS
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  "Why can't 1 of these things work right."    Pain/Comfort  Pain Rating 1: 4/10  Location 1: abdomen  Pain Addressed 1: Nurse notified, Pre-medicate for activity, Distraction (Slow performance of all act)    Objective:   Patient found with: wound vac, peripheral IV (hemodialysis catheter)    Functional Mobility:  Bed Mobility:   Scooting/Bridging: Moderate Assistance (To position LEs and uncross LEs in bed)  Supine to Sit: Modified Independent (With use of bed rails, with HOB elevated by 50 deg)  Sit to Supine: Minimum Assistance (With murtaza time to perf, able to elevated B LEs with A from UEs, with Monty to lower trunk)    Transfers:  Sit <> Stand Assistance: Moderate Assistance, Minimum Assistance, Supervision (S: from bed.  Monty: to facilitate sitting on shower bench, perf 2xs, with use of grab bar and wall for support.  ModA: to stand from shower bench perf 2xs - with use of grab bar and wall for support)  Sit <> Stand Assistive Device: No Assistive Device  Bed <> Chair Technique: Stand Pivot  Bed <> Chair Assistance: Supervision  Bed <> Chair Assistive Device: Rolling Walker    Gait:   Gait Distance: Pt amb 16', 32', 380', 24', with standing/sitting rest breaks bn trials.  No episodes of LOB.  Time taken to don abdominal binder   Assistance 1: Supervision, Stand by Assistance (SBA: for turning sec to lateral trunk " "sway)  Gait Assistive Device: Rolling walker  Gait Pattern: swing-through gait  Gait Deviation(s): decreased saskia, decreased step length, decreased stride length    Balance:   Static Sit: GOOD-: Takes MODERATE challenges from all directions but inconsistently  Dynamic Sit: GOOD-: Maintains balance through MODERATE excursions of active trunk movement,     Static Stand: FAIR: Maintains without assist but unable to take challenges  Dynamic stand: GOOD-: Needs SUPERVISION only during gait and able to self right with moderate      Therapeutic Activities and Exercises:  Whiteboard updated  6MWT: Pt amb 272' = 82.91m  Gait speed: 0.23 m/s  Car transfer: simulation perf 2xs at shower bench: pt ed to amb bwds to touch lip of shower, slowly sitting with Migdalia, pivoting sitting position and lifting LEs over ~3" lip with inc time to perf, 1x required A from hands to lift R LE, pt perf "in/out" of car, modA to stand     AM-PAC 6 CLICK MOBILITY  How much help from another person does this patient currently need?   1 = Unable, Total/Dependent Assistance  2 = A lot, Maximum/Moderate Assistance  3 = A little, Minimum/Contact Guard/Supervision  4 = None, Modified [place]/Independent    Turning over in bed (including adjusting bedclothes, sheets and blankets)?: 3  Sitting down on and standing up from a chair with arms (e.g., wheelchair, bedside commode, etc.): 2  Moving from lying on back to sitting on the side of the bed?: 3  Moving to and from a bed to a chair (including a wheelchair)?: 3  Need to walk in hospital room?: 3  Climbing 3-5 steps with a railing?: 2  Total Score: 16    AM-PAC Raw Score CMS G-Code Modifier Level of Impairment Assistance   6 % Total / Unable   7 - 9 CM 80 - 100% Maximal Assist   10 - 14 CL 60 - 80% Moderate Assist   15 - 19 CK 40 - 60% Moderate Assist   20 - 22 CJ 20 - 40% Minimal Assist   23 CI 1-20% SBA / CGA   24 CH 0% Independent/ Mod I     Patient left supine with all lines intact, call " button in reach and mother present.    Assessment:  Jhonny Diana is a 27 y.o. male with a medical diagnosis of Liver replaced by transplant.  Pt amb mother are concerned about pts ability to perf car transfers.  PA requested that car transfers are put on hold sec to abdominal leakage and tunneling of open wound, therefore perf at shower.  Pt was also agreeable to participate in gait training following.  Pt is primarily inhibited by open/leaking wound site, wound vac, pain, and excessive abdominal and LE edema.  Change to POC, increase in treatment frequency to 5xs/wk.  Pt will cont to benefit from skilled PT services to improve safety and independence in functional transfers and gait.     Rehab identified problem list/impairments: Rehab identified problem list/impairments: weakness, impaired endurance, impaired cardiopulmonary response to activity, decreased lower extremity function, impaired coordination, edema, impaired balance, gait instability, impaired functional mobilty, impaired self care skills    Rehab potential is fair.    Activity tolerance: Fair    Discharge recommendations: Discharge Facility/Level Of Care Needs: home health PT     Barriers to discharge: Barriers to Discharge: None    Equipment recommendations: Equipment Needed After Discharge: walker, rolling     GOALS:    Physical Therapy Goals        Problem: Physical Therapy Goal    Goal Priority Disciplines Outcome Goal Variances Interventions   Physical Therapy Goal     PT/OT, PT Ongoing (interventions implemented as appropriate)     Description:  Goals to be met by: 2017    Patient will increase functional independence with mobility by performin. Supine to sit with CGA.   2. Sit to supine with CGA  3. Sit to stand transfer with SBA using LRAD or no Ad.   4. Bed to chair transfer with SBA using LRAD  - GOAL MET  5. Gait x150 feet with SBA using LRAD or no Ad. - GOAL MET  6. Ascend/descend 4 stair with bilateral Handrails with CGA.  7.  Lower extremity exercise program x20 reps per handout, with independence.  8.  Pt to perf car transfer simulation with CGA.                                PLAN:    Patient to be seen 5 x/week  to address the above listed problems via gait training, therapeutic activities, therapeutic exercises, neuromuscular re-education  Plan of Care expires: 11/19/17  Plan of Care reviewed with: patient, parent         Tory Lima, PT  10/31/2017

## 2017-10-31 NOTE — ASSESSMENT & PLAN NOTE
- likely ischemic ATN from decreased renal perfusion (volume shifts from paracentesis, anemia). Urine sediment with muddy brown casts  - underwent OLTx on 10/19; received intraop CRRT  - remains dialysis dependent at this time  - currently oliguric  - last HD on Saturday  - planning for perm cath today  - will arrange for HD tomorrow   - Still anuric   - Continue with intake and output monitoring   - next treatment tentatively planned for Wednesday

## 2017-10-31 NOTE — PROGRESS NOTES
Met with patient and his mother for discharge teaching.  Reviewed discharge book- Discharge Instructions for Liver Transplant Patients.  Medication section will be reviewed by Pharm D.  Allowed time for questions and answers.  Asked patient to complete Self Assessment of Liver Transplant Patients located in discharge book.

## 2017-10-31 NOTE — PROGRESS NOTES
Asked to see for vac change to right lateral chevron wound .  Pt and mother report wound vac has alarmed all night . Upon removal of NPWT dressing , noted serous exudate pouring from the wound. Appreciated a tract medially which extends up to 6cm under the staple line. Discussed findings with Norma Blackwood and offered to change to a Veraflow NPWT device with next dressing change to continually irrigate the wound .     10/31/17 1400       Incision/Site 10/30/17 0911 Right abdomen lower other (see comments)   Date First Assessed/Time First Assessed: 10/30/17 0911   Present Prior to Hospital Arrival?: No  Side: Right  Location: abdomen  Orientation: lower  Incision Type: other (see comments)  Closure Method: sutures  Additional Comments: ostomy bag to old j...   Wound Image    Incision WDL ex   Dressing Appearance dry;intact   Appearance slough;moist;pink  (scattered necrotic tissue and slough)   Periwound Area normal skin tone   Drainage Characteristics/Odor serous;yellow   Drainage Amount copious   Wound Length (cm) 2.5   Wound Width (cm) 9.5   Depth (cm) 4.5   Tunneling (depth (cm)/location) 6cm medially   Wound Edges open   Cleansed W/ sterile normal saline   Irrigated W/ sterile normal saline   Interventions liquid bandage applied   Therapy Setting continuous therapy;125 mmHg   Pressure Setting 125 mmHg   Negative Pressure Wound Therapy Dressing foam, black   Sponges Inserted 2   Black Sponges Inserted 2   Black Sponges Removed 1   Dressing Change Due 11/03/17     Recommendations:  Twice weekly NPWT dressing changes to chevron wound   Wound has a lot of debris , but does not tolerate gentle cleansing with debrisoft debridement sponge. Norma will discuss with the team and may opt for a washout.  Zoë Murphy RN CWON  [id number]

## 2017-10-31 NOTE — PLAN OF CARE
Problem: Physical Therapy Goal  Goal: Physical Therapy Goal  Goals to be met by: 2017    Patient will increase functional independence with mobility by performin. Supine to sit with CGA.   2. Sit to supine with CGA  3. Sit to stand transfer with SBA using LRAD or no Ad.   4. Bed to chair transfer with SBA using LRAD  - GOAL MET  5. Gait x150 feet with SBA using LRAD or no Ad. - GOAL MET  6. Ascend/descend 4 stair with bilateral Handrails with CGA.  7. Lower extremity exercise program x20 reps per handout, with independence.  8.  Pt to perf car transfer simulation with CGA.              Outcome: Ongoing (interventions implemented as appropriate)  Pt achieved 2/8 goals.  Goal modified and added to more accurately reflect CLOF.

## 2017-11-01 LAB
ALBUMIN SERPL BCP-MCNC: 2.4 G/DL
ALBUMIN SERPL BCP-MCNC: 2.6 G/DL
ALP SERPL-CCNC: 195 U/L
ALP SERPL-CCNC: 195 U/L
ALT SERPL W/O P-5'-P-CCNC: 10 U/L
ALT SERPL W/O P-5'-P-CCNC: 9 U/L
ANION GAP SERPL CALC-SCNC: 10 MMOL/L
ANION GAP SERPL CALC-SCNC: 12 MMOL/L
AST SERPL-CCNC: 17 U/L
AST SERPL-CCNC: 19 U/L
BASOPHILS # BLD AUTO: 0.07 K/UL
BASOPHILS # BLD AUTO: 0.07 K/UL
BASOPHILS NFR BLD: 0.6 %
BASOPHILS NFR BLD: 0.6 %
BILIRUB SERPL-MCNC: 2.7 MG/DL
BILIRUB SERPL-MCNC: 2.9 MG/DL
BUN SERPL-MCNC: 37 MG/DL
BUN SERPL-MCNC: 38 MG/DL
CALCIUM SERPL-MCNC: 8.5 MG/DL
CALCIUM SERPL-MCNC: 8.6 MG/DL
CHLORIDE SERPL-SCNC: 102 MMOL/L
CHLORIDE SERPL-SCNC: 103 MMOL/L
CO2 SERPL-SCNC: 19 MMOL/L
CO2 SERPL-SCNC: 22 MMOL/L
CREAT SERPL-MCNC: 4 MG/DL
CREAT SERPL-MCNC: 4.1 MG/DL
DIFFERENTIAL METHOD: ABNORMAL
DIFFERENTIAL METHOD: ABNORMAL
EOSINOPHIL # BLD AUTO: 0 K/UL
EOSINOPHIL # BLD AUTO: 0.1 K/UL
EOSINOPHIL NFR BLD: 0.2 %
EOSINOPHIL NFR BLD: 0.5 %
ERYTHROCYTE [DISTWIDTH] IN BLOOD BY AUTOMATED COUNT: 20.6 %
ERYTHROCYTE [DISTWIDTH] IN BLOOD BY AUTOMATED COUNT: 20.6 %
EST. GFR  (AFRICAN AMERICAN): 21.4 ML/MIN/1.73 M^2
EST. GFR  (AFRICAN AMERICAN): 22.1 ML/MIN/1.73 M^2
EST. GFR  (NON AFRICAN AMERICAN): 18.5 ML/MIN/1.73 M^2
EST. GFR  (NON AFRICAN AMERICAN): 19.1 ML/MIN/1.73 M^2
GLUCOSE SERPL-MCNC: 86 MG/DL
GLUCOSE SERPL-MCNC: 91 MG/DL
HCT VFR BLD AUTO: 22.9 %
HCT VFR BLD AUTO: 26.5 %
HGB BLD-MCNC: 7.8 G/DL
HGB BLD-MCNC: 8.8 G/DL
IMM GRANULOCYTES # BLD AUTO: 0.09 K/UL
IMM GRANULOCYTES # BLD AUTO: 0.1 K/UL
IMM GRANULOCYTES NFR BLD AUTO: 0.8 %
IMM GRANULOCYTES NFR BLD AUTO: 0.9 %
LYMPHOCYTES # BLD AUTO: 1.3 K/UL
LYMPHOCYTES # BLD AUTO: 1.3 K/UL
LYMPHOCYTES NFR BLD: 11.1 %
LYMPHOCYTES NFR BLD: 11.5 %
MAGNESIUM SERPL-MCNC: 1.9 MG/DL
MAGNESIUM SERPL-MCNC: 2 MG/DL
MCH RBC QN AUTO: 31.3 PG
MCH RBC QN AUTO: 31.5 PG
MCHC RBC AUTO-ENTMCNC: 33.2 G/DL
MCHC RBC AUTO-ENTMCNC: 34.1 G/DL
MCV RBC AUTO: 92 FL
MCV RBC AUTO: 94 FL
MONOCYTES # BLD AUTO: 0.6 K/UL
MONOCYTES # BLD AUTO: 0.7 K/UL
MONOCYTES NFR BLD: 5.4 %
MONOCYTES NFR BLD: 6.2 %
NEUTROPHILS # BLD AUTO: 9 K/UL
NEUTROPHILS # BLD AUTO: 9.7 K/UL
NEUTROPHILS NFR BLD: 81.1 %
NEUTROPHILS NFR BLD: 81.1 %
NRBC BLD-RTO: 0 /100 WBC
NRBC BLD-RTO: 0 /100 WBC
PHOSPHATE SERPL-MCNC: 2.6 MG/DL
PHOSPHATE SERPL-MCNC: 2.7 MG/DL
PLATELET # BLD AUTO: 131 K/UL
PLATELET # BLD AUTO: 134 K/UL
PMV BLD AUTO: 10.1 FL
PMV BLD AUTO: 10.3 FL
POTASSIUM SERPL-SCNC: 3.5 MMOL/L
POTASSIUM SERPL-SCNC: 3.8 MMOL/L
PROT SERPL-MCNC: 4.7 G/DL
PROT SERPL-MCNC: 4.9 G/DL
RBC # BLD AUTO: 2.48 M/UL
RBC # BLD AUTO: 2.81 M/UL
SODIUM SERPL-SCNC: 134 MMOL/L
SODIUM SERPL-SCNC: 134 MMOL/L
WBC # BLD AUTO: 11.09 K/UL
WBC # BLD AUTO: 11.9 K/UL

## 2017-11-01 PROCEDURE — 25000003 PHARM REV CODE 250: Performed by: NURSE PRACTITIONER

## 2017-11-01 PROCEDURE — 25000003 PHARM REV CODE 250: Performed by: STUDENT IN AN ORGANIZED HEALTH CARE EDUCATION/TRAINING PROGRAM

## 2017-11-01 PROCEDURE — 84100 ASSAY OF PHOSPHORUS: CPT

## 2017-11-01 PROCEDURE — 83735 ASSAY OF MAGNESIUM: CPT | Mod: 91

## 2017-11-01 PROCEDURE — 20600001 HC STEP DOWN PRIVATE ROOM

## 2017-11-01 PROCEDURE — 90935 HEMODIALYSIS ONE EVALUATION: CPT | Mod: 58,,, | Performed by: INTERNAL MEDICINE

## 2017-11-01 PROCEDURE — 80053 COMPREHEN METABOLIC PANEL: CPT

## 2017-11-01 PROCEDURE — 63600175 PHARM REV CODE 636 W HCPCS: Performed by: TRANSPLANT SURGERY

## 2017-11-01 PROCEDURE — 63600175 PHARM REV CODE 636 W HCPCS: Performed by: NURSE PRACTITIONER

## 2017-11-01 PROCEDURE — 63600175 PHARM REV CODE 636 W HCPCS: Performed by: STUDENT IN AN ORGANIZED HEALTH CARE EDUCATION/TRAINING PROGRAM

## 2017-11-01 PROCEDURE — 84100 ASSAY OF PHOSPHORUS: CPT | Mod: 91

## 2017-11-01 PROCEDURE — 63600175 PHARM REV CODE 636 W HCPCS: Performed by: PHYSICIAN ASSISTANT

## 2017-11-01 PROCEDURE — 25000003 PHARM REV CODE 250: Performed by: PHYSICIAN ASSISTANT

## 2017-11-01 PROCEDURE — 83735 ASSAY OF MAGNESIUM: CPT

## 2017-11-01 PROCEDURE — 90935 HEMODIALYSIS ONE EVALUATION: CPT

## 2017-11-01 PROCEDURE — 36415 COLL VENOUS BLD VENIPUNCTURE: CPT

## 2017-11-01 PROCEDURE — 97530 THERAPEUTIC ACTIVITIES: CPT

## 2017-11-01 PROCEDURE — 85025 COMPLETE CBC W/AUTO DIFF WBC: CPT | Mod: 91

## 2017-11-01 PROCEDURE — 25000003 PHARM REV CODE 250: Performed by: INTERNAL MEDICINE

## 2017-11-01 PROCEDURE — 63600175 PHARM REV CODE 636 W HCPCS: Performed by: INTERNAL MEDICINE

## 2017-11-01 PROCEDURE — 25000003 PHARM REV CODE 250: Performed by: TRANSPLANT SURGERY

## 2017-11-01 PROCEDURE — 80053 COMPREHEN METABOLIC PANEL: CPT | Mod: 91

## 2017-11-01 RX ORDER — OXYCODONE AND ACETAMINOPHEN 5; 325 MG/1; MG/1
1 TABLET ORAL EVERY 4 HOURS PRN
Status: DISCONTINUED | OUTPATIENT
Start: 2017-11-01 | End: 2017-11-08 | Stop reason: HOSPADM

## 2017-11-01 RX ORDER — SODIUM CHLORIDE 9 MG/ML
INJECTION, SOLUTION INTRAVENOUS
Status: DISCONTINUED | OUTPATIENT
Start: 2017-11-01 | End: 2017-11-03

## 2017-11-01 RX ORDER — BISACODYL 10 MG
10 SUPPOSITORY, RECTAL RECTAL DAILY PRN
Status: DISCONTINUED | OUTPATIENT
Start: 2017-11-01 | End: 2017-11-08 | Stop reason: HOSPADM

## 2017-11-01 RX ORDER — SODIUM CHLORIDE 9 MG/ML
INJECTION, SOLUTION INTRAVENOUS ONCE
Status: COMPLETED | OUTPATIENT
Start: 2017-11-01 | End: 2017-11-01

## 2017-11-01 RX ORDER — OXYCODONE AND ACETAMINOPHEN 10; 325 MG/1; MG/1
1 TABLET ORAL EVERY 4 HOURS PRN
Status: DISCONTINUED | OUTPATIENT
Start: 2017-11-01 | End: 2017-11-08 | Stop reason: HOSPADM

## 2017-11-01 RX ADMIN — TACROLIMUS 3 MG: 1 CAPSULE ORAL at 05:11

## 2017-11-01 RX ADMIN — OXYCODONE HYDROCHLORIDE AND ACETAMINOPHEN 1 TABLET: 5; 325 TABLET ORAL at 04:11

## 2017-11-01 RX ADMIN — ATOVAQUONE 1500 MG: 750 SUSPENSION ORAL at 05:11

## 2017-11-01 RX ADMIN — TRAMADOL HYDROCHLORIDE 50 MG: 50 TABLET, COATED ORAL at 09:11

## 2017-11-01 RX ADMIN — FLUCONAZOLE 200 MG: 200 TABLET ORAL at 04:11

## 2017-11-01 RX ADMIN — SODIUM BICARBONATE 650 MG TABLET 1300 MG: at 08:11

## 2017-11-01 RX ADMIN — MORPHINE 100 MG: 10 SOLUTION ORAL at 04:11

## 2017-11-01 RX ADMIN — FAMOTIDINE 20 MG: 20 TABLET, FILM COATED ORAL at 08:11

## 2017-11-01 RX ADMIN — SODIUM CHLORIDE: 0.9 INJECTION, SOLUTION INTRAVENOUS at 12:11

## 2017-11-01 RX ADMIN — BISACODYL 10 MG: 10 SUPPOSITORY RECTAL at 03:11

## 2017-11-01 RX ADMIN — OXYCODONE HYDROCHLORIDE 5 MG: 5 TABLET ORAL at 03:11

## 2017-11-01 RX ADMIN — MYCOPHENOLATE MOFETIL 1000 MG: 250 CAPSULE ORAL at 04:11

## 2017-11-01 RX ADMIN — BISACODYL 10 MG: 5 TABLET, COATED ORAL at 04:11

## 2017-11-01 RX ADMIN — OXYCODONE HYDROCHLORIDE AND ACETAMINOPHEN 1 TABLET: 5; 325 TABLET ORAL at 08:11

## 2017-11-01 RX ADMIN — SODIUM BICARBONATE 650 MG TABLET 1300 MG: at 04:11

## 2017-11-01 RX ADMIN — HEPARIN SODIUM 1000 UNITS: 1000 INJECTION, SOLUTION INTRAVENOUS; SUBCUTANEOUS at 12:11

## 2017-11-01 RX ADMIN — POLYETHYLENE GLYCOL 3350 17 G: 17 POWDER, FOR SOLUTION ORAL at 04:11

## 2017-11-01 RX ADMIN — PREDNISONE 20 MG: 10 TABLET ORAL at 05:11

## 2017-11-01 RX ADMIN — MYCOPHENOLATE MOFETIL 1000 MG: 250 CAPSULE ORAL at 08:11

## 2017-11-01 RX ADMIN — DOCUSATE SODIUM 100 MG: 100 CAPSULE, LIQUID FILLED ORAL at 08:11

## 2017-11-01 RX ADMIN — ONDANSETRON 8 MG: 8 TABLET, ORALLY DISINTEGRATING ORAL at 07:11

## 2017-11-01 RX ADMIN — HEPARIN SODIUM 5000 UNITS: 5000 INJECTION, SOLUTION INTRAVENOUS; SUBCUTANEOUS at 05:11

## 2017-11-01 RX ADMIN — ACETAMINOPHEN 500 MG: 500 TABLET ORAL at 05:11

## 2017-11-01 RX ADMIN — DOCUSATE SODIUM 100 MG: 100 CAPSULE, LIQUID FILLED ORAL at 04:11

## 2017-11-01 RX ADMIN — HEPARIN SODIUM 5000 UNITS: 5000 INJECTION, SOLUTION INTRAVENOUS; SUBCUTANEOUS at 04:11

## 2017-11-01 RX ADMIN — HEPARIN SODIUM 5000 UNITS: 5000 INJECTION, SOLUTION INTRAVENOUS; SUBCUTANEOUS at 08:11

## 2017-11-01 RX ADMIN — OXYCODONE HYDROCHLORIDE 5 MG: 5 TABLET ORAL at 10:11

## 2017-11-01 NOTE — PROGRESS NOTES
Report received from SISSY Lucio. Pt arrived from floor AAOx4. Acute dialysis initiated via R chestwall permcath without difficulty.

## 2017-11-01 NOTE — PT/OT/SLP PROGRESS
"Physical Therapy  Treatment    Jhonny Diana   MRN: [id number]   Admitting Diagnosis: Liver replaced by transplant    PT Received On: 11/01/17  PT Start Time: 1740     PT Stop Time: 1752    PT Total Time (min): 12 min       Billable Minutes:  Therapeutic Activity 12    Treatment Type: Treatment  PT/PTA: PT     PTA Visit Number: 0       General Precautions: Standard, fall  Orthopedic Precautions: N/A   Braces: N/A    Do you have any cultural, spiritual, Latter day conflicts, given your current situation?: none reported     Subjective:  Communicated with nursing prior to session.  "This is so gross, why won't it stop leaking." - Referring to profuse leaking of pts site of old IRISH drain.      Pain/Comfort  Pain Rating 1: 7/10  Location 1: abdomen    Objective:   Patient found with: wound vac, peripheral IV    Functional Mobility:  Bed Mobility:    Pt presented in low recliner chair     Transfers:  Sit <> Stand Assistance: Minimum Assistance (Pt performed multiple times from low recliner chair, with inc time taken for sequencing, demo and safety.  Pt ed on ant translation of body.  Monty to eccentrically control descent)  Sit <> Stand Assistive Device: No Assistive Device  Bed <> Chair Technique: Stand Pivot  Bed <> Chair Assistance: Minimum Assistance  Bed <> Chair Assistive Device: No Assistive Device    Gait:   Gait Distance: Pt amb a few steps in the hospital room  Assistance 1: Contact Guard Assistance  Gait Assistive Device: No device  Gait Pattern: swing-to gait  Gait Deviation(s): decreased saskia    Balance:   Static Sit: FAIR+: Able to take MINIMAL challenges from all directions  Dynamic Sit: FAIR+: Maintains balance through MINIMAL excursions of active trunk motion  Static Stand: FAIR: Maintains without assist but unable to take challenges  Dynamic stand: FAIR: Needs CONTACT GUARD during gait     Therapeutic Activities and Exercises:  Whiteboard updated  Ed on LE positioning to inhibit crossing of LEs  Review of " HEP with handouts: 20 reps of each therex  Sit<>stand: with Migdalia with hand and foot placement ed, scooting to edge of seat, ant translation of body, straightening LEs, and trunk ext     AM-PAC 6 CLICK MOBILITY  How much help from another person does this patient currently need?   1 = Unable, Total/Dependent Assistance  2 = A lot, Maximum/Moderate Assistance  3 = A little, Minimum/Contact Guard/Supervision  4 = None, Modified [place]/Independent    Turning over in bed (including adjusting bedclothes, sheets and blankets)?: 3  Sitting down on and standing up from a chair with arms (e.g., wheelchair, bedside commode, etc.): 3  Moving from lying on back to sitting on the side of the bed?: 3  Moving to and from a bed to a chair (including a wheelchair)?: 3  Need to walk in hospital room?: 3  Climbing 3-5 steps with a railing?: 2  Total Score: 17    AM-PAC Raw Score CMS G-Code Modifier Level of Impairment Assistance   6 % Total / Unable   7 - 9 CM 80 - 100% Maximal Assist   10 - 14 CL 60 - 80% Moderate Assist   15 - 19 CK 40 - 60% Moderate Assist   20 - 22 CJ 20 - 40% Minimal Assist   23 CI 1-20% SBA / CGA   24 CH 0% Independent/ Mod I     Patient left up in chair with all lines intact, call button in reach and nursing and mother present.    Assessment:  Jhonny Diana is a 27 y.o. male with a medical diagnosis of Liver replaced by transplant.  Pt presented in stance with mother, crying today.  Pt is having continuously leakage through old IRISH drain site, which is causing anxiety and pain.  Pt has complaints of cramping as well.  Pt has difficulty with sit<>stand specifically has impaired foot placement and UE strength for push up.  Family continues to be concerned regarding car transfers, therefore discussed with case management the possibility of pt staying at the [facility], which is attached to the hospital, upon discharge.  Pt will cont to require skilled PT services to improve functional transfers and  gait.      Rehab identified problem list/impairments: Rehab identified problem list/impairments: impaired endurance, impaired self care skills, impaired functional mobilty, gait instability, weakness, impaired balance, pain, decreased lower extremity function, decreased upper extremity function, edema, impaired skin, impaired cardiopulmonary response to activity    Rehab potential is fair.    Activity tolerance: Poor    Discharge recommendations: Discharge Facility/Level Of Care Needs: home health PT     Barriers to discharge: Barriers to Discharge: None    Equipment recommendations: Equipment Needed After Discharge: walker, rolling     GOALS:    Physical Therapy Goals        Problem: Physical Therapy Goal    Goal Priority Disciplines Outcome Goal Variances Interventions   Physical Therapy Goal     PT/OT, PT Ongoing (interventions implemented as appropriate)     Description:  Goals to be met by: 2017    Patient will increase functional independence with mobility by performin. Supine to sit with CGA.   2. Sit to supine with CGA  3. Sit to stand transfer with SBA using LRAD or no Ad.   4. Bed to chair transfer with SBA using LRAD  - GOAL MET  5. Gait x150 feet with SBA using LRAD or no Ad. - GOAL MET  6. Ascend/descend 4 stair with bilateral Handrails with CGA.  7. Lower extremity exercise program x20 reps per handout, with independence.  8.  Pt to perf car transfer simulation with CGA.                                PLAN:    Patient to be seen 5 x/week  to address the above listed problems via gait training, therapeutic activities, therapeutic exercises, neuromuscular re-education  Plan of Care expires: 17  Plan of Care reviewed with: patient, mother         Tory Amato, PT  2017

## 2017-11-01 NOTE — PROGRESS NOTES
3hr HD treatment completed 2L of fluid removed pt tolerated well. Both lumens of a R chestwall permcath instilled with Heparin, capped and taped.report given to SISSY Lucio. Pt escorted to [facility].

## 2017-11-01 NOTE — PROGRESS NOTES
[facility]  Liver Transplant  Progress Note    Patient Name: Jhonny Diana  MRN: [id number]  Admission Date: 2017  Hospital Length of Stay: 35 days  Code Status: Full Code  Primary Care Provider: Provider Notinsystem  Post-Operative Day: 13    ORGAN:   LIVER  Disease Etiology: Acute Alcoholic Hepatitis  Donor Type:    - Brain Death  CDC High Risk:   No  Donor CMV Status:   Donor CMV Status: Positive  Donor HBcAB:   Negative  Donor HCV Status:   Negative  Whole or Partial: Whole Liver  Biliary Anastomosis: End to End  Arterial Anatomy: Standard  Subjective:     History of Present Illness:  Jhonny Diana is a 28 yo male with hx of childhood asthma and heavy alcohol abuse (fifth of liquor daily since teenager, last drink 2.5 wks ago per mother) who was transferred to [facility] for higher level of care/liver transplant evaluation. Patient initially presented to OSH for a 2 mo hx of progressively worsening abdominal pain with associated fatigue, jaundice, and abdominal distention with ~20# weight gain.  Reports family hx of alcoholic cirrhosis in father. Pt was recently diagnosed with alcoholic hepatitis during his admission at OSH on -9/15. During that hospitalization, patient was given ceftriaxone for SBP ppx, prednisone for alcoholic hepatitis, and diuresed. Pt was discharged and instructed to follow up with PCP and AA within 1 wk. After his discharge, patient returned to ED 4 days after for increased weight gain and worsening abdominal pain/jaundice.     Mr. Diana was admitted to the CMICU on  for decompensated alcoholic hepatitis with high MELD score, severe HE, and LAURA. Hepatology consulted and followed to evaluate liver transplant candidacy. Pt started on albumin and lasix for LAURA and possibility for HRS. However, nephrology felt that LAURA is likely multifactorial: ATN given hypotensive episodes at OSH and toxic given severe hyperbilirubinemia; not truly HRS. Lactulose/rifaximin started  for HE and mental status slowly improved. Mr. Diana with significant ABD pain since admission with ABD distension. CT abdomen was significant for pericolonic fat stranding of right colon; no obstruction. Also with diffuse mesenteric edema. No significant ascites noted, no cholecystitis/cholelithiasis/ductal dilation. US liver with doppler showed appropriate vasculature.  He was stepped down to Hospital Medicine on 10/5.  Paracentesis was done on 10/9 with 3.3L removed and 10/12 with 1.8 removed, no infection. His diet was advanced and Dietary was consulted to help get adequate caloric intake. Listed for liver transplant 10/17 with MELD 36.    Pt received organ offer and underwent liver txp 10/19 which took place without complication.  Steroid induction used, CMV +/+.  Post operatively, LFTs/enzymes trended down nicely.  POD 5 U/S with elevated RIs and sluggish flow in HA system, otherwise ok.  Pt with LAURA prior to and s/p txp requiring dialysis.  He is tolerating HD with last HD 10/30.      Hospital Course:  Interval hx:  No acute events overnight.  Plan for HD today.  Made 300 cc urine yesterday.  Permcath in place.  Chevron w wound vac(originally opened 10/27, vac placed 10/29), per wound care, undermining to left at least 8 cm.  Wd care packed w sponge and applied wd vac.  Wound bed w slough, difficult to debride giving increased pain.  Pt's mom reports intermittent delirium/emotional lability still occurring day and night as well as she is concerned regading transition to apt/not ready for discharge yet.  Rehab and psych consulted.      Scheduled Meds:   sodium chloride 0.9%   Intravenous Once    atovaquone  1,500 mg Oral Daily    bisacodyl  10 mg Oral Daily    bisacodyl  10 mg Rectal Once    docusate sodium  100 mg Oral TID PC    ergocalciferol  50,000 Units Oral Q7 Days    famotidine  20 mg Oral QHS    fluconazole  200 mg Oral Daily    heparin (porcine)  5,000 Units Subcutaneous Q8H    HYDROmorphone   0.2 mg Intravenous Once    mycophenolate  1,000 mg Oral BID    polyethylene glycol  17 g Oral Daily    predniSONE  20 mg Oral Daily    sodium bicarbonate  1,300 mg Oral BID    tacrolimus  3 mg Oral BID    valganciclovir 50 mg/ml  100 mg Oral Every Mon, Wed, Fri     Continuous Infusions:   PRN Meds:sodium chloride 0.9%, bisacodyl, dextrose 50%, dextrose 50%, glucagon (human recombinant), glucose, glucose, heparin (porcine), insulin aspart, ondansetron, oxyCODONE-acetaminophen, oxyCODONE-acetaminophen, simethicone, traMADol    Review of Systems   Constitutional: Positive for activity change, appetite change and fatigue. Negative for diaphoresis and fever.   HENT: Negative for mouth sores.    Respiratory: Negative for cough, choking, shortness of breath and wheezing.    Cardiovascular: Positive for leg swelling. Negative for chest pain and palpitations.   Gastrointestinal: Positive for abdominal distention. Negative for abdominal pain, constipation, diarrhea, nausea and vomiting.   Genitourinary: Positive for decreased urine volume. Negative for difficulty urinating.   Musculoskeletal: Negative for arthralgias, back pain and joint swelling.   Skin: Positive for wound.   Allergic/Immunologic: Positive for immunocompromised state.   Neurological: Negative for tremors, weakness and headaches.   Hematological: Bruises/bleeds easily.   Psychiatric/Behavioral: Negative for agitation, confusion, dysphoric mood, hallucinations and sleep disturbance. The patient is nervous/anxious.    All other systems reviewed and are negative.    Objective:     Vital Signs (Most Recent):  Temp: 98.2 °F (36.8 °C) (11/01/17 1240)  Pulse: 97 (11/01/17 1240)  Resp: 18 (11/01/17 1240)  BP: 134/76 (11/01/17 1240)  SpO2: 100 % (11/01/17 0843) Vital Signs (24h Range):  Temp:  [98 °F (36.7 °C)-98.9 °F (37.2 °C)] 98.2 °F (36.8 °C)  Pulse:  [] 97  Resp:  [16-18] 18  SpO2:  [98 %-100 %] 100 %  BP: (109-140)/(58-82) 134/76     Weight: 76.1 kg  (167 lb 12.3 oz)  Body mass index is 26.28 kg/m².    Intake/Output - Last 3 Shifts       10/30 0700 - 10/31 0659 10/31 0700 - 11/01 0659 11/01 0700 - 11/02 0659    P.O. 600 620     Other 600  600    Total Intake(mL/kg) 1200 (15.5) 620 (8.1) 600 (7.9)    Urine (mL/kg/hr)  300 (0.2)     Emesis/NG output  0 (0)     Other 3200 (1.7) 250 (0.1) 2600 (4.4)    Stool 0 (0) 0 (0) 0 (0)    Blood  0 (0)     Total Output 3200 550 2600    Net -2000 +70 -2000           Urine Occurrence 0 x 0 x     Stool Occurrence 1 x 1 x 1 x    Emesis Occurrence  0 x           Physical Exam   Constitutional: He is oriented to person, place, and time. He appears well-developed. No distress.   Temporal and distal extremity muscle wasting   HENT:   Head: Normocephalic and atraumatic.   Mouth/Throat: No oropharyngeal exudate.   Eyes: EOM are normal. Pupils are equal, round, and reactive to light. Scleral icterus is present.   Neck: Normal range of motion. Neck supple.   Cardiovascular: Normal rate, regular rhythm and normal heart sounds.  Exam reveals no gallop and no friction rub.    No murmur heard.  Pulmonary/Chest: Effort normal. No respiratory distress. He has no rales.   Dec R base   Abdominal: Soft. Bowel sounds are normal. He exhibits distension. There is no tenderness.   Cheon inc with wound vac to R lateral side x 7 cm  Serosang output   Musculoskeletal: Normal range of motion. He exhibits edema (1-2+ generalized).   Neurological: He is alert and oriented to person, place, and time.   Skin: Skin is warm and dry. He is not diaphoretic.   jaundiced   Psychiatric: He has a normal mood and affect. His behavior is normal. Judgment and thought content normal.   Nursing note and vitals reviewed.    Laboratory:  Immunosuppressants         Stop Route Frequency     tacrolimus capsule 3 mg      -- Oral 2 times daily     mycophenolate capsule 1,000 mg      -- Oral 2 times daily        CBC:     Recent Labs  Lab 11/01/17  0805   WBC 11.09   RBC 2.48*    HGB 7.8*   HCT 22.9*   *   MCV 92   MCH 31.5*   MCHC 34.1     CMP:     Recent Labs  Lab 11/01/17  0805   GLU 91   CALCIUM 8.5*   ALBUMIN 2.4*   PROT 4.7*   *   K 3.5   CO2 22*      BUN 38*   CREATININE 4.0*   ALKPHOS 195*   ALT 10   AST 17   BILITOT 2.7*     Labs within the past 24 hours have been reviewed.    Diagnostic Results:  I have personally reviewed all pertinent imaging studies.    Assessment/Plan:     * Liver transplant 10/19/2017 for acute alcoholic hepatitis    - LFTs/enzymes trended down nicely  - Cont bowel regimen.  - Transitioned pain control to scheduled acetaminophen + Tramadol as hallucinations with oxycodone- tolerating well.  - Liver US POD 5 with elevated RIs and slowed flow HA system.  US 10/23 technically difficult exam w difficulty visualizing arterial system and slow flow w no tardus parvus.  Will monitor labs and repeat prior to discharge.    - Wound vac placed 10/29  - Repeat liver u/s 11/1 with continued sluggish flow in HA system.  Will start ASA in AM.        LAURA (acute kidney injury)    - Pt reports minimal UOP yesterday  - Nephrology following.  Apprec recs.  - Tolerated HD 10/30/17- tolerated well.  - Permcath placed 10/30/17  - Set up for outpt HD        Long-term use of immunosuppressant medication    - See prophylactic immunotherapy          Prophylactic immunotherapy    - Continue tacrolimus, prednisone taper, Cellcept.  - Will monitor for signs of toxic side effects, check daily tacrolimus troughs, and change meds accordingly.        Acute blood loss anemia    - PRBC x 2 u 10/26 and responded nicely  - Monitor        Anasarca    - 2/2 liver disease, LAURA, and hypoalbuminemia  - Expect improvement once kidneys improve.    - Attempt to keep dressings dry        At risk for opportunistic infections    - D/C Dapsone secondary to worsening anemia and continue atovaquone for PCP prophylaxis  - Continue Valcyte for CMV prophylaxis  - Continue fluconazole for fungal  prophylaxis          Severe malnutrition    - Dietary consulted and following.   - Continue protein powder        Alcoholism /alcohol abuse    - Psych consulted, high risk for transplant. To complete ABU program s/p transplant          Physical deconditioning    - Working with PT/OT  - Rehab consult placed              VTE Risk Mitigation         Ordered     heparin (porcine) injection 5,000 Units  Every 8 hours     Route:  Subcutaneous        10/30/17 0703     heparin (porcine) injection 1,000 Units  As needed (PRN)     Route:  Intra-Catheter        10/30/17 1649     Place sequential compression device  Until discontinued      10/20/17 0346     High Risk of VTE  Once      10/20/17 0346          The patients clinical status was discussed at multidisplinary rounds, involving transplant surgery, transplant medicine, pharmacy, nursing, nutrition, and social work    Discharge Planning:  Monitor kidney function --> may need outpt HD  Monitor HH needs vs rehab for deconditioned status      Geronimo Vera PA-C  Liver Transplant  [facility]

## 2017-11-01 NOTE — PROGRESS NOTES
[facility]  Adult Nutrition  Progress Note    SUMMARY     Recommendations  1. Continue Renal diet and beneprotein TID.  2. Encourage PO intake to meet 75% of EEN/EPN.   3. If intake drops to < 50% of meals, recommend providing Novasource renal TID.   4. Provided HD renal diet education to patient's mother including suggestions for meal options once discharged.   5.RD to follow    Goals: PO intake to meet 85% of EEN/EPN  Nutrition Goal Status: new  Communication of RD Recs: reviewed with RN    Reason for Assessment  Reason for Assessment: RD follow-up  Diagnosis: transplant/postoperative complications (liver transplant POD# 13)  Relevent Medical History: ETOH abuse, severe malnutrition, LAURA, DM 2   Interdisciplinary Rounds: attended  Level of Care:  (F/U x1 weekly)  General Information Comments: Pt not in room, but discussed pt diet and intake with mom. Mom reports that he really dislikes the food and supplements but has been drinking arginaid in water. He will also eat a full meal if it's a food he's craving so mom has been bringing him other meals. Educated mom on high phosphorus foods and the renal diet and provided handouts. Also suggested trying salt free seasonings and other ONS besides boost.     Nutrition Discharge Planning: Provided post transplant nutrition education. Encouraged food safety and adequate protein intake. Generally healthy diet encouraged. RD contact information left. All questions answered.     Nutrition Prescription Ordered  Current Diet Order: Regular Renal   Nutrition Order Comments: -  Current Nutrition Support Formula Ordered: Discontinued  Current Nutrition Support Rate Ordered: 0 (ml)  Current Nutrition Support Frequency Ordered: mL/hr  Oral Nutrition Supplement: beneprotein     Evaluation of Received Nutrients/Fluid Intake   I/O: 75 mL  x 24 hrs     Intake/Output Summary (Last 24 hours) at 11/01/17 1308  Last data filed at 11/01/17 0500   Gross per 24 hour   Intake  "             160 ml   Output               75 ml   Net               85 ml     Comments: LBM: 11/1  % Intake of Estimated Energy Needs: 50 - 75 %  % Meal Intake: 50%     Nutrition Risk Screen  No nutrition risk indicators present at this time    Nutrition/Diet History  Patient Reported Diet/Restrictions/Preferences: renal  Typical Food/Fluid Intake: PT eating ~50-75% of meals depending on where its from.   Food Preferences: No cultural or Hoahaoism preferences noted at this time   Factors Affecting Nutritional Intake: decreased appetite     Labs/Tests/Procedures/Meds  Pertinent Labs Reviewed: reviewed  Pertinent Labs Comments: Na 134, CO2 22, Ca 8.5, Phos 2.6, Alb 2.4, Tbili 2.7, BUN 38  Pertinent Medications Reviewed: reviewed  Pertinent Medications Comments: Insulin, ergocalciferol, famotidine, heparin, tacrolimus, prednisone    Physical Findings  Overall Physical Appearance: overweight, edematous  Tubes:  (none)  Oral/Mouth Cavity: WDL  Skin: incision (bruises)    Anthropometrics  Height: 5' 7" (170.2 cm)  Weight Method: Standard Scale  Weight: 76.1 kg (167 lb 12.3 oz)  Ideal Body Weight (IBW), Male: 148 lb  % Ideal Body Weight, Male (lb): 113.36 lb  BMI (Calculated): 26.3  BMI Grade: 25 - 29.9 - overweight    Estimated/Assessed Needs  Weight Used For Calorie Calculations: 76.1 kg (167 lb 12.3 oz)   Height (cm): 170.2 cm  Energy Calorie Requirements (kcal): 2112  Energy Need Method: [place]-St Jeor (x1.25)  RMR ([place]-St. Jeor Equation): 1689.62  Weight Used For Protein Calculations: 76.1 kg (167 lb 12.3 oz)  Protein Requirements: 91.5- 99 g/day (1.2-1.3 g/kg)  Fluid Requirements (mL): 1 mL/kcal or per MD  Fluid Need Method: RDA Method   RDA Method (mL): 2112     Assessment and Plan  Nutrition Problem  Inappropriate food choices     Related to (etiology):   Need for low Na/P 2' LAURA and s/p Liver transplant     Signs and Symptoms (as evidenced by):   Pt refuses to eat low P/Na foods at most " meals    Interventions/Recommendations (treatment strategy):  See recs above     Nutrition Diagnosis Status:   New    Monitor and Evaluation  Food and Nutrient Intake: energy intake, food and beverage intake  Food and Nutrient Adminstration: diet order  Knowledge/Beliefs/Attitudes: food and nutrition knowledge/skill  Physical Activity and Function: nutrition-related ADLs and IADLs  Anthropometric Measurements: weight, weight change  Biochemical Data, Medical Tests and Procedures: electrolyte and renal panel, gastrointestinal profile, glucose/endocrine profile, inflammatory profile, lipid profile  Nutrition-Focused Physical Findings: overall appearance, skin    Nutrition Risk  Level of Risk: other (see comments) (F/U 2x weekly)    Nutrition Follow-Up  RD Follow-up?: Yes

## 2017-11-01 NOTE — ASSESSMENT & PLAN NOTE
Recommendations  -  Encourage mobility, OOB in chair at least 3 hours per day, and early ambulation as appropriate  -  PT/OT evaluate and treat  -  Pain management  -  Monitor for and prevent skin breakdown and pressure ulcers  · Early mobility, repositioning/weight shifting every 20-30 minutes when sitting, turn patient every 2 hours, proper mattress/overlay and chair cushioning, pressure relief/heel protector boots  -  DVT prophylaxis:  [facility]  -  Reviewed discharge options (IP rehab, SNF, HH therapy, and OP therapy)

## 2017-11-01 NOTE — HPI
Jhonny Diana is a 27-year-old male with PMHx of childhood asthma and heavy alcohol abuse (fifth of liquor daily since teenager, last drink 2.5 wks ago per mother), alcoholic hepatitis (revent diagnosis 09/2017), & HTN .  Patient presented to OSH with progressively worsening abdominal pain with associated fatigue, jaundice, and abdominal distention with a 20 lbs weight gain. He was transferred to [facility] on 9/27/17 for decompensated alcoholic hepatitis with high MELD score, severe HE, and LAURA (requiring dialysis). .  Hepatology was consulted and followed patient for liver transplant candidacy.  CT adb/pelvis revealed pericolonic fat stranding of right colon diffuse mesenteric edema with no obstruction.  US liver with doppler showed appropriate vasculature.  He was stepped down to the floor on 10/5.  He was listed for liver transplant on 10/7 and underwent successful liver transplant on 10/19.  Hospital course was further complicated by ascites/anasarca (Paracentesis 10/9 & 10/12 with 1.8 removed with no infection via fluid analysis), delirium (psych consulted), and wound to R abd (wound care is following and he has a wound vac placed. Twice weekly dressing changed recommended per wound care, but wound has much debris.  A wash-out may be pending).     Functional History: Per chart review, nereyda lives in [place], [place] with roomate in a single story home with 4 steps to enter.  After discharge, he will love with his mother in a H with no steps to enter.  Prior to admission, (I) with ADLs, mobility, and driving.  He is R handed. DME: none.

## 2017-11-01 NOTE — SUBJECTIVE & OBJECTIVE
Scheduled Meds:   sodium chloride 0.9%   Intravenous Once    atovaquone  1,500 mg Oral Daily    bisacodyl  10 mg Oral Daily    bisacodyl  10 mg Rectal Once    docusate sodium  100 mg Oral TID PC    ergocalciferol  50,000 Units Oral Q7 Days    famotidine  20 mg Oral QHS    fluconazole  200 mg Oral Daily    heparin (porcine)  5,000 Units Subcutaneous Q8H    HYDROmorphone  0.2 mg Intravenous Once    mycophenolate  1,000 mg Oral BID    polyethylene glycol  17 g Oral Daily    predniSONE  20 mg Oral Daily    sodium bicarbonate  1,300 mg Oral BID    tacrolimus  3 mg Oral BID    valganciclovir 50 mg/ml  100 mg Oral Every Mon, Wed, Fri     Continuous Infusions:   PRN Meds:sodium chloride 0.9%, bisacodyl, dextrose 50%, dextrose 50%, glucagon (human recombinant), glucose, glucose, heparin (porcine), insulin aspart, ondansetron, oxyCODONE-acetaminophen, oxyCODONE-acetaminophen, simethicone, traMADol    Review of Systems   Constitutional: Positive for activity change, appetite change and fatigue. Negative for diaphoresis and fever.   HENT: Negative for mouth sores.    Respiratory: Negative for cough, choking, shortness of breath and wheezing.    Cardiovascular: Positive for leg swelling. Negative for chest pain and palpitations.   Gastrointestinal: Positive for abdominal distention. Negative for abdominal pain, constipation, diarrhea, nausea and vomiting.   Genitourinary: Positive for decreased urine volume. Negative for difficulty urinating.   Musculoskeletal: Negative for arthralgias, back pain and joint swelling.   Skin: Positive for wound.   Allergic/Immunologic: Positive for immunocompromised state.   Neurological: Negative for tremors, weakness and headaches.   Hematological: Bruises/bleeds easily.   Psychiatric/Behavioral: Negative for agitation, confusion, dysphoric mood, hallucinations and sleep disturbance. The patient is nervous/anxious.    All other systems reviewed and are negative.    Objective:      Vital Signs (Most Recent):  Temp: 98.2 °F (36.8 °C) (11/01/17 1240)  Pulse: 97 (11/01/17 1240)  Resp: 18 (11/01/17 1240)  BP: 134/76 (11/01/17 1240)  SpO2: 100 % (11/01/17 0843) Vital Signs (24h Range):  Temp:  [98 °F (36.7 °C)-98.9 °F (37.2 °C)] 98.2 °F (36.8 °C)  Pulse:  [] 97  Resp:  [16-18] 18  SpO2:  [98 %-100 %] 100 %  BP: (109-140)/(58-82) 134/76     Weight: 76.1 kg (167 lb 12.3 oz)  Body mass index is 26.28 kg/m².    Intake/Output - Last 3 Shifts       10/30 0700 - 10/31 0659 10/31 0700 - 11/01 0659 11/01 0700 - 11/02 0659    P.O. 600 620     Other 600  600    Total Intake(mL/kg) 1200 (15.5) 620 (8.1) 600 (7.9)    Urine (mL/kg/hr)  300 (0.2)     Emesis/NG output  0 (0)     Other 3200 (1.7) 250 (0.1) 2600 (4.4)    Stool 0 (0) 0 (0) 0 (0)    Blood  0 (0)     Total Output 3200 550 2600    Net -2000 +70 -2000           Urine Occurrence 0 x 0 x     Stool Occurrence 1 x 1 x 1 x    Emesis Occurrence  0 x           Physical Exam   Constitutional: He is oriented to person, place, and time. He appears well-developed. No distress.   Temporal and distal extremity muscle wasting   HENT:   Head: Normocephalic and atraumatic.   Mouth/Throat: No oropharyngeal exudate.   Eyes: EOM are normal. Pupils are equal, round, and reactive to light. Scleral icterus is present.   Neck: Normal range of motion. Neck supple.   Cardiovascular: Normal rate, regular rhythm and normal heart sounds.  Exam reveals no gallop and no friction rub.    No murmur heard.  Pulmonary/Chest: Effort normal. No respiratory distress. He has no rales.   Dec R base   Abdominal: Soft. Bowel sounds are normal. He exhibits distension. There is no tenderness.   Chevron inc with wound vac to R lateral side x 7 cm  Serosang output   Musculoskeletal: Normal range of motion. He exhibits edema (1-2+ generalized).   Neurological: He is alert and oriented to person, place, and time.   Skin: Skin is warm and dry. He is not diaphoretic.   jaundiced    Psychiatric: He has a normal mood and affect. His behavior is normal. Judgment and thought content normal.   Nursing note and vitals reviewed.    Laboratory:  Immunosuppressants         Stop Route Frequency     tacrolimus capsule 3 mg      -- Oral 2 times daily     mycophenolate capsule 1,000 mg      -- Oral 2 times daily        CBC:     Recent Labs  Lab 11/01/17  0805   WBC 11.09   RBC 2.48*   HGB 7.8*   HCT 22.9*   *   MCV 92   MCH 31.5*   MCHC 34.1     CMP:     Recent Labs  Lab 11/01/17 0805   GLU 91   CALCIUM 8.5*   ALBUMIN 2.4*   PROT 4.7*   *   K 3.5   CO2 22*      BUN 38*   CREATININE 4.0*   ALKPHOS 195*   ALT 10   AST 17   BILITOT 2.7*     Labs within the past 24 hours have been reviewed.    Diagnostic Results:  I have personally reviewed all pertinent imaging studies.

## 2017-11-01 NOTE — PLAN OF CARE
Problem: Physical Therapy Goal  Goal: Physical Therapy Goal  Goals to be met by: 2017    Patient will increase functional independence with mobility by performin. Supine to sit with CGA.   2. Sit to supine with CGA  3. Sit to stand transfer with SBA using LRAD or no Ad.   4. Bed to chair transfer with SBA using LRAD  - GOAL MET  5. Gait x150 feet with SBA using LRAD or no Ad. - GOAL MET  6. Ascend/descend 4 stair with bilateral Handrails with CGA.  7. Lower extremity exercise program x20 reps per handout, with independence.  8.  Pt to perf car transfer simulation with CGA.               Outcome: Ongoing (interventions implemented as appropriate)  Pt progressing towards HEP and sit<>stand goals.

## 2017-11-01 NOTE — PLAN OF CARE
Pt AAOx4, VSS, afebrile overnight  Chevron incision ANDRZEJ- painting with betadine independently, Wound vac remains 125 suction continuously, not needed to be changed until 11/3, old IRISH wound underneath incision remains CDI, dressing reinforced overnight, no output noted   Complained of stomach pains overnight, received PRN bisacodyl suppository and obtained mild relief  Pt up to bathroom with 1 person assist- 75 uop noted overnight, 1 BM overnight  Non skid socks worn, bed in lowest position, splinting with pillow encouraged, will continue to monitor  Plan for dialysis this AM

## 2017-11-01 NOTE — CONSULTS
Inpatient consult to Physical Medicine Rehab  Consult performed by: SHERON CLEMENT  Consult ordered by: KATHERINE REY  Reason for consult: assess rehab needs        Reviewed patient history and current admission.  Rehab team following.  Full consult to follow.    LANE Marin, FNP-C  Physical Medicine & Rehabilitation   11/01/2017  Spectralink: [id number]

## 2017-11-01 NOTE — PROGRESS NOTES
SW Update:    EZEKIEL faxed [employer]- Wound Vac order and supporting documents to [phone]. EZEKIEL requested wound vac to be delivered to pt's hospital room. SW remains available for education, resources, support, and discharge planning as appropriate.

## 2017-11-01 NOTE — PLAN OF CARE
Problem: Patient Care Overview  Goal: Plan of Care Review  Outcome: Ongoing (interventions implemented as appropriate)  27 year old POD 13 from Liver Transplant. Pt had 3 hrs of HD this AM with 2 L removed. U/S of liver completed this afternoon. Pt still complains of abdominal pain. Patient encouraged to ambulate with assistance. Mother at bedside pulling pt self meds. Pt pain medication changed to Percocet PRN q4h.

## 2017-11-01 NOTE — ASSESSMENT & PLAN NOTE
- LFTs/enzymes trended down nicely  - Cont bowel regimen.  - Transitioned pain control to scheduled acetaminophen + Tramadol as hallucinations with oxycodone- tolerating well.  - Liver US POD 5 with elevated RIs and slowed flow HA system.  US 10/23 technically difficult exam w difficulty visualizing arterial system and slow flow w no tardus parvus.  Will monitor labs and repeat prior to discharge.    - Wound vac placed 10/29  - Repeat liver u/s 11/1 with continued sluggish flow in HA system.  Will start ASA in AM.

## 2017-11-01 NOTE — HOSPITAL COURSE
10/24-27/17P: Evaluated by therapy.  Bed mobility Mod-MaxA.  Sit to stand ModA and transfers Monty.  Ambulated 4 side steps CGA.  UBD MaxA and LBD totalA.  10/31-11/1/17: Participating with therapy.  Bed mobility Monty-ModA.  Sit to stand SV-ModA and transfers SV-ModA & RW .  Ambulated 452 ft SV-SBA & RW.  UBD Monty and LBD set-upA.

## 2017-11-02 PROBLEM — R90.89 ABNORMAL FINDING ON MRI OF BRAIN: Status: ACTIVE | Noted: 2017-11-02

## 2017-11-02 PROBLEM — F10.90 ALCOHOL USE DISORDER: Status: ACTIVE | Noted: 2017-11-02

## 2017-11-02 PROBLEM — R56.9 SEIZURE: Status: ACTIVE | Noted: 2017-11-02

## 2017-11-02 LAB
ALBUMIN SERPL BCP-MCNC: 2.3 G/DL
ALP SERPL-CCNC: 252 U/L
ALT SERPL W/O P-5'-P-CCNC: 7 U/L
ANION GAP SERPL CALC-SCNC: 11 MMOL/L
AST SERPL-CCNC: 18 U/L
BASOPHILS # BLD AUTO: 0.04 K/UL
BASOPHILS NFR BLD: 0.5 %
BILIRUB SERPL-MCNC: 2.6 MG/DL
BUN SERPL-MCNC: 24 MG/DL
CALCIUM SERPL-MCNC: 8.2 MG/DL
CHLORIDE SERPL-SCNC: 103 MMOL/L
CO2 SERPL-SCNC: 22 MMOL/L
CREAT SERPL-MCNC: 3.3 MG/DL
DIFFERENTIAL METHOD: ABNORMAL
EOSINOPHIL # BLD AUTO: 0 K/UL
EOSINOPHIL NFR BLD: 0.4 %
ERYTHROCYTE [DISTWIDTH] IN BLOOD BY AUTOMATED COUNT: 20.9 %
EST. GFR  (AFRICAN AMERICAN): 27.8 ML/MIN/1.73 M^2
EST. GFR  (NON AFRICAN AMERICAN): 24.1 ML/MIN/1.73 M^2
FOLATE SERPL-MCNC: 4.7 NG/ML
GLUCOSE SERPL-MCNC: 69 MG/DL
HCT VFR BLD AUTO: 23 %
HCT VFR BLD AUTO: 24.1 %
HGB BLD-MCNC: 7.5 G/DL
HGB BLD-MCNC: 8.5 G/DL
IMM GRANULOCYTES # BLD AUTO: 0.04 K/UL
IMM GRANULOCYTES NFR BLD AUTO: 0.5 %
LYMPHOCYTES # BLD AUTO: 1.2 K/UL
LYMPHOCYTES NFR BLD: 15.6 %
MAGNESIUM SERPL-MCNC: 1.7 MG/DL
MCH RBC QN AUTO: 30.4 PG
MCHC RBC AUTO-ENTMCNC: 32.6 G/DL
MCV RBC AUTO: 93 FL
MONOCYTES # BLD AUTO: 0.5 K/UL
MONOCYTES NFR BLD: 5.7 %
NEUTROPHILS # BLD AUTO: 6.1 K/UL
NEUTROPHILS NFR BLD: 77.3 %
NRBC BLD-RTO: 0 /100 WBC
PHOSPHATE SERPL-MCNC: 2.3 MG/DL
PLATELET # BLD AUTO: 126 K/UL
PMV BLD AUTO: 11 FL
POCT GLUCOSE: 117 MG/DL (ref 70–110)
POCT GLUCOSE: 90 MG/DL (ref 70–110)
POCT GLUCOSE: 96 MG/DL (ref 70–110)
POTASSIUM SERPL-SCNC: 3.6 MMOL/L
PROLACTIN SERPL IA-MCNC: 102.4 NG/ML
PROT SERPL-MCNC: 4.6 G/DL
RBC # BLD AUTO: 2.47 M/UL
SODIUM SERPL-SCNC: 136 MMOL/L
TACROLIMUS BLD-MCNC: 6.1 NG/ML
VIT B12 SERPL-MCNC: 601 PG/ML
WBC # BLD AUTO: 7.93 K/UL

## 2017-11-02 PROCEDURE — 87040 BLOOD CULTURE FOR BACTERIA: CPT | Mod: 59

## 2017-11-02 PROCEDURE — 63600175 PHARM REV CODE 636 W HCPCS: Performed by: PHYSICIAN ASSISTANT

## 2017-11-02 PROCEDURE — 84146 ASSAY OF PROLACTIN: CPT

## 2017-11-02 PROCEDURE — 25000003 PHARM REV CODE 250: Performed by: STUDENT IN AN ORGANIZED HEALTH CARE EDUCATION/TRAINING PROGRAM

## 2017-11-02 PROCEDURE — 99233 SBSQ HOSP IP/OBS HIGH 50: CPT | Mod: 24,,, | Performed by: INTERNAL MEDICINE

## 2017-11-02 PROCEDURE — 80197 ASSAY OF TACROLIMUS: CPT

## 2017-11-02 PROCEDURE — 63600175 PHARM REV CODE 636 W HCPCS: Performed by: NURSE PRACTITIONER

## 2017-11-02 PROCEDURE — 63600175 PHARM REV CODE 636 W HCPCS: Performed by: STUDENT IN AN ORGANIZED HEALTH CARE EDUCATION/TRAINING PROGRAM

## 2017-11-02 PROCEDURE — 83735 ASSAY OF MAGNESIUM: CPT

## 2017-11-02 PROCEDURE — 84100 ASSAY OF PHOSPHORUS: CPT

## 2017-11-02 PROCEDURE — 20600001 HC STEP DOWN PRIVATE ROOM

## 2017-11-02 PROCEDURE — 25000003 PHARM REV CODE 250: Performed by: NURSE PRACTITIONER

## 2017-11-02 PROCEDURE — 25000003 PHARM REV CODE 250: Performed by: PHYSICIAN ASSISTANT

## 2017-11-02 PROCEDURE — 80053 COMPREHEN METABOLIC PANEL: CPT

## 2017-11-02 PROCEDURE — 84252 ASSAY OF VITAMIN B-2: CPT

## 2017-11-02 PROCEDURE — 36415 COLL VENOUS BLD VENIPUNCTURE: CPT

## 2017-11-02 PROCEDURE — 25000003 PHARM REV CODE 250: Performed by: TRANSPLANT SURGERY

## 2017-11-02 PROCEDURE — 97530 THERAPEUTIC ACTIVITIES: CPT

## 2017-11-02 PROCEDURE — 99233 SBSQ HOSP IP/OBS HIGH 50: CPT | Mod: 24,,, | Performed by: NURSE PRACTITIONER

## 2017-11-02 PROCEDURE — 99223 1ST HOSP IP/OBS HIGH 75: CPT | Mod: ,,, | Performed by: PSYCHIATRY & NEUROLOGY

## 2017-11-02 PROCEDURE — 85014 HEMATOCRIT: CPT

## 2017-11-02 PROCEDURE — 85018 HEMOGLOBIN: CPT

## 2017-11-02 PROCEDURE — 63600175 PHARM REV CODE 636 W HCPCS: Performed by: TRANSPLANT SURGERY

## 2017-11-02 PROCEDURE — 99222 1ST HOSP IP/OBS MODERATE 55: CPT | Mod: ,,, | Performed by: PSYCHIATRY & NEUROLOGY

## 2017-11-02 PROCEDURE — 82746 ASSAY OF FOLIC ACID SERUM: CPT

## 2017-11-02 PROCEDURE — S5010 5% DEXTROSE AND 0.45% SALINE: HCPCS | Performed by: NURSE PRACTITIONER

## 2017-11-02 PROCEDURE — 82607 VITAMIN B-12: CPT

## 2017-11-02 PROCEDURE — 97535 SELF CARE MNGMENT TRAINING: CPT

## 2017-11-02 PROCEDURE — 99233 SBSQ HOSP IP/OBS HIGH 50: CPT | Mod: ,,, | Performed by: PSYCHIATRY & NEUROLOGY

## 2017-11-02 PROCEDURE — 85025 COMPLETE CBC W/AUTO DIFF WBC: CPT

## 2017-11-02 RX ORDER — TACROLIMUS 1 MG/1
4 CAPSULE ORAL 2 TIMES DAILY
Status: CANCELLED | OUTPATIENT
Start: 2017-11-02

## 2017-11-02 RX ORDER — LEVETIRACETAM 10 MG/ML
1000 INJECTION INTRAVASCULAR ONCE
Status: COMPLETED | OUTPATIENT
Start: 2017-11-02 | End: 2017-11-02

## 2017-11-02 RX ORDER — LEVETIRACETAM 500 MG/1
500 TABLET ORAL 2 TIMES DAILY
Status: DISCONTINUED | OUTPATIENT
Start: 2017-11-03 | End: 2017-11-08 | Stop reason: HOSPADM

## 2017-11-02 RX ORDER — LORAZEPAM 2 MG/ML
INJECTION INTRAMUSCULAR
Status: DISPENSED
Start: 2017-11-02 | End: 2017-11-02

## 2017-11-02 RX ORDER — MAGNESIUM SULFATE HEPTAHYDRATE 40 MG/ML
2 INJECTION, SOLUTION INTRAVENOUS ONCE
Status: COMPLETED | OUTPATIENT
Start: 2017-11-02 | End: 2017-11-02

## 2017-11-02 RX ORDER — LORAZEPAM 2 MG/ML
1 INJECTION INTRAMUSCULAR ONCE AS NEEDED
Status: DISPENSED | OUTPATIENT
Start: 2017-11-02 | End: 2017-11-02

## 2017-11-02 RX ORDER — THIAMINE HCL 50 MG
100 TABLET ORAL DAILY
Status: DISCONTINUED | OUTPATIENT
Start: 2017-11-03 | End: 2017-11-08 | Stop reason: HOSPADM

## 2017-11-02 RX ORDER — DEXTROSE MONOHYDRATE AND SODIUM CHLORIDE 5; .45 G/100ML; G/100ML
INJECTION, SOLUTION INTRAVENOUS CONTINUOUS
Status: DISCONTINUED | OUTPATIENT
Start: 2017-11-02 | End: 2017-11-03

## 2017-11-02 RX ADMIN — TACROLIMUS 3 MG: 1 CAPSULE ORAL at 08:11

## 2017-11-02 RX ADMIN — SODIUM BICARBONATE 650 MG TABLET 1300 MG: at 09:11

## 2017-11-02 RX ADMIN — ATOVAQUONE 1500 MG: 750 SUSPENSION ORAL at 08:11

## 2017-11-02 RX ADMIN — SODIUM BICARBONATE 650 MG TABLET 1300 MG: at 08:11

## 2017-11-02 RX ADMIN — OXYCODONE HYDROCHLORIDE AND ACETAMINOPHEN 1 TABLET: 5; 325 TABLET ORAL at 07:11

## 2017-11-02 RX ADMIN — MYCOPHENOLATE MOFETIL 1000 MG: 250 CAPSULE ORAL at 09:11

## 2017-11-02 RX ADMIN — OXYCODONE AND ACETAMINOPHEN 1 TABLET: 10; 325 TABLET ORAL at 08:11

## 2017-11-02 RX ADMIN — HEPARIN SODIUM 5000 UNITS: 5000 INJECTION, SOLUTION INTRAVENOUS; SUBCUTANEOUS at 05:11

## 2017-11-02 RX ADMIN — FAMOTIDINE 20 MG: 20 TABLET, FILM COATED ORAL at 08:11

## 2017-11-02 RX ADMIN — SIMETHICONE CHEW TAB 80 MG 80 MG: 80 TABLET ORAL at 08:11

## 2017-11-02 RX ADMIN — HEPARIN SODIUM 5000 UNITS: 5000 INJECTION, SOLUTION INTRAVENOUS; SUBCUTANEOUS at 08:11

## 2017-11-02 RX ADMIN — FLUCONAZOLE 200 MG: 200 TABLET ORAL at 08:11

## 2017-11-02 RX ADMIN — THIAMINE HYDROCHLORIDE 100 MG: 100 INJECTION, SOLUTION INTRAMUSCULAR; INTRAVENOUS at 11:11

## 2017-11-02 RX ADMIN — DEXTROSE MONOHYDRATE AND SODIUM CHLORIDE: 5; .45 INJECTION, SOLUTION INTRAVENOUS at 11:11

## 2017-11-02 RX ADMIN — DEXTROSE MONOHYDRATE AND SODIUM CHLORIDE: 5; .45 INJECTION, SOLUTION INTRAVENOUS at 10:11

## 2017-11-02 RX ADMIN — MYCOPHENOLATE MOFETIL 1000 MG: 250 CAPSULE ORAL at 08:11

## 2017-11-02 RX ADMIN — MAGNESIUM SULFATE IN WATER 2 G: 40 INJECTION, SOLUTION INTRAVENOUS at 11:11

## 2017-11-02 RX ADMIN — OXYCODONE AND ACETAMINOPHEN 1 TABLET: 10; 325 TABLET ORAL at 01:11

## 2017-11-02 RX ADMIN — DOCUSATE SODIUM 100 MG: 100 CAPSULE, LIQUID FILLED ORAL at 08:11

## 2017-11-02 RX ADMIN — MAGNESIUM SULFATE IN WATER 2 G: 40 INJECTION, SOLUTION INTRAVENOUS at 10:11

## 2017-11-02 RX ADMIN — BISACODYL 10 MG: 5 TABLET, COATED ORAL at 08:11

## 2017-11-02 RX ADMIN — PREDNISONE 20 MG: 10 TABLET ORAL at 08:11

## 2017-11-02 RX ADMIN — LEVETIRACETAM 1000 MG: 10 INJECTION INTRAVENOUS at 07:11

## 2017-11-02 NOTE — ASSESSMENT & PLAN NOTE
- Nephrology following.  Apprec recs.  - Tolerated HD 11/01/17- tolerated well.  - Permcath placed 10/30/17  - Set up for outpt HD

## 2017-11-02 NOTE — SUBJECTIVE & OBJECTIVE
"    Family History     None        Social History Main Topics    Smoking status: Former Smoker     Types: Cigarettes    Smokeless tobacco: Never Used    Alcohol use Yes      Comment: a fifth of liquor daily for years, cut back over last 2 months    Drug use: No    Sexual activity: Not on file     Psychotherapeutics     Start     Stop Route Frequency Ordered    11/02/17 1012  lorazepam (ATIVAN) 2 mg/mL injection     Comments:  Created by cabinet override    11/02 2214 11/02/17 1012    10/27/17 1711  lorazepam (ATIVAN) 2 mg/mL injection     Comments:  Created by cabinet override    10/28 0514   10/27/17 1711           Review of Systems   Constitutional: Positive for activity change.   Gastrointestinal: Positive for abdominal pain.   Musculoskeletal: Positive for back pain.   Psychiatric/Behavioral: Positive for agitation. Negative for confusion, hallucinations, sleep disturbance and suicidal ideas. The patient is nervous/anxious.      Objective:     Vital Signs (Most Recent):  Temp: 98.4 °F (36.9 °C) (11/02/17 0759)  Pulse: 103 (11/02/17 0759)  Resp: 18 (11/02/17 0759)  BP: (!) 118/59 (11/02/17 0759)  SpO2: 99 % (11/02/17 0759) Vital Signs (24h Range):  Temp:  [98.2 °F (36.8 °C)-99.4 °F (37.4 °C)] 98.4 °F (36.9 °C)  Pulse:  [] 103  Resp:  [18] 18  SpO2:  [99 %-100 %] 99 %  BP: (109-140)/(58-82) 118/59     Height: 5' 7" (170.2 cm)  Weight: 75.8 kg (167 lb 1.7 oz)  Body mass index is 26.17 kg/m².      Intake/Output Summary (Last 24 hours) at 11/02/17 1014  Last data filed at 11/02/17 0500   Gross per 24 hour   Intake             1320 ml   Output             2725 ml   Net            -1405 ml       Physical Exam     Significant Labs:   Last 24 Hours:   Recent Lab Results       11/02/17  0814 11/02/17  0711 11/02/17  0440      Immature Granulocytes   0.5     Immature Grans (Abs)   0.04     Albumin   2.3(L)     Alkaline Phosphatase   252(H)     ALT   7(L)     Anion Gap   11     AST   18     Baso #   0.04     " Basophil%   0.5     Total Bilirubin   2.6  Comment:  For infants and newborns, interpretation of results should be based  on gestational age, weight and in agreement with clinical  observations.  Premature Infant recommended reference ranges:  Up to 24 hours.............<8.0 mg/dL  Up to 48 hours............<12.0 mg/dL  3-5 days..................<15.0 mg/dL  6-29 days.................<15.0 mg/dL  (H)     BUN, Bld   24(H)     Calcium   8.2(L)     Chloride   103     CO2   22(L)     Creatinine   3.3(H)     Differential Method   Automated     eGFR if    27.8(A)     eGFR if non    24.1  Comment:  Calculation used to obtain the estimated glomerular filtration  rate (eGFR) is the CKD-EPI equation.   (A)     Eos #   0.0     Eosinophil%   0.4     Glucose   69(L)     Gran #   6.1     Gran%   77.3(H)     Hematocrit  24.1(L) 23.0(L)     Hemoglobin  8.5(L) 7.5(L)     Lymph #   1.2     Lymph%   15.6(L)     Magnesium   1.7     MCH   30.4     MCHC   32.6     MCV   93     Mono #   0.5     Mono%   5.7     MPV   11.0     nRBC   0     Phosphorus   2.3(L)     Platelets   126(L)     POCT Glucose 90       Potassium   3.6     Total Protein   4.6(L)     RBC   2.47(L)     RDW   20.9(H)     Sodium   136     Tacrolimus Lvl   6.1  Comment:  Testing performed by Liquid Chromatography-Tandem  Mass Spectrometry (LC-MS/MS).  This test was developed and its performance characteristics  determined by [facility], Department of Pathology  and Laboratory Medicine in a manner consistent with CLIA  requirements. It has not been cleared or approved by the [place]  Food and Drug Administration.  This test is used for clinical   purposes.  It should not be regarded as investigational or for  research.       WBC   7.93           Significant Imaging: None

## 2017-11-02 NOTE — HPI
Mr. 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 Today, pt had 2 episodes of new seizure. Per chart review, seizures each lasted 10-15 secs, started w stuttering same syllable followed by eyes rolling back and full body convulsing. Pt had postictal symptoms, drowsiness, tearful and sleepy for about 45 minutes following. Gen Neuro was consulted, recommended MRI and EEG. MRI result showed a focus of diffusion restriction in the central missy, possibly acute stroke vs central pontine myelinolysis (CPM), and Vascular Neurology was consulted.  Pt reports no recent issues with extremity weakness, numbness/tingling, slurred speech, vision changes, or facial droop. He states for several weeks, has had intermittent issues with swallowing food, stating food feels temporarily stuck in his throat. Pt and mother at bedside deny any acute changes in mental status or motor function apart from during seizures today. Pt not on blood thinners, is on immunosuppressants s/p liver transplant.

## 2017-11-02 NOTE — CONSULTS
"[facility]  Psychiatry  Progress Note    Patient Name: Jhonny Diana  MRN: [id number]   Code Status: Full Code  Admission Date: 9/27/2017  Hospital Length of Stay: 36 days  Expected Discharge Date: 11/3/2017  Attending Physician: Solis Chapa MD  Primary Care Provider: Provider [facility]    Current Legal Status: N/A    Patient information was obtained from patient, parent and past medical records.     Subjective:     Principal Problem:Liver replaced by transplant    Chief Complaint: "anxiety"    HPI: 28 yo WM s/p liver transplant secondary to alcoholic cirrhosis post op day 14. Patient was initially seen as part of pre transplant workup. He will come to the [facility] program to begin treatment of alcohol use disorder when discharge from transplant service. Psychiatry consulted at this time due to concern for delirium, anxiety.     Patient seen with mother at bedside, chart and labs reviewed. At this time patient and his mother deny any episodes of confusion stating, "that was much worse before the transplant." Patient is awake, alert and oriented to person, place, time and situation. He reports episodes of anxiety and irritability attributed to, "not being as independent as I was before, I want to be that again" and continued drainage from previous luke drain site that is causing distress. Patient states he would, "like to learn some coping skills like CBT to deal with my anxiety." Patient reports feeling "very tired" and irritable after dialysis." States this usually last until the next day. Discussed current medications including opiates and immunosuppressants and their psychiatric side effects.     Hospital Course: Interval History 10/4: Patient visited this morning.  He was oriented to place but not to situation, self, or date.  He displayed trouble with word finding.  Perseverated on the removal of his NG tube.  Informed patient that he was improving but not ready to complete a psychiatric interview " at this time.  Patient did not display understanding.     10/5: Patient visited this morning while he was undergoing a bedside ultrasound from the primary team.  Spoke to mother at bedside who feels patient's mental status is improving.  She continues to express concern that patient is not oriented to the date.  He told a nurse yesterday that his mother was a doctor who kept bothering him.  Patient also told long winded stories about the [employer] book series last night.  He is better oriented to situation, as he understands he is in the hospital and is very sick.    11/2: Per chart review some concern for delirium, which appears to be resolving. Pt reports increased fatigue and irritability s/p dialysis.         Family History     None        Social History Main Topics    Smoking status: Former Smoker     Types: Cigarettes    Smokeless tobacco: Never Used    Alcohol use Yes      Comment: a fifth of liquor daily for years, cut back over last 2 months    Drug use: No    Sexual activity: Not on file     Psychotherapeutics     Start     Stop Route Frequency Ordered    11/02/17 1012  lorazepam (ATIVAN) 2 mg/mL injection     Comments:  Created by cabinet override    11/02 2214 11/02/17 1012    10/27/17 1711  lorazepam (ATIVAN) 2 mg/mL injection     Comments:  Created by cabinet override    10/28 0514   10/27/17 1711           Review of Systems   Constitutional: Positive for activity change.   Gastrointestinal: Positive for abdominal pain.   Musculoskeletal: Positive for back pain.   Psychiatric/Behavioral: Positive for agitation. Negative for confusion, hallucinations, sleep disturbance and suicidal ideas. The patient is nervous/anxious.      Objective:     Vital Signs (Most Recent):  Temp: 98.4 °F (36.9 °C) (11/02/17 0759)  Pulse: 103 (11/02/17 0759)  Resp: 18 (11/02/17 0759)  BP: (!) 118/59 (11/02/17 0759)  SpO2: 99 % (11/02/17 0759) Vital Signs (24h Range):  Temp:  [98.2 °F (36.8 °C)-99.4 °F (37.4 °C)] 98.4  "°F (36.9 °C)  Pulse:  [] 103  Resp:  [18] 18  SpO2:  [99 %-100 %] 99 %  BP: (109-140)/(58-82) 118/59     Height: 5' 7" (170.2 cm)  Weight: 75.8 kg (167 lb 1.7 oz)  Body mass index is 26.17 kg/m².      Intake/Output Summary (Last 24 hours) at 11/02/17 1014  Last data filed at 11/02/17 0500   Gross per 24 hour   Intake             1320 ml   Output             2725 ml   Net            -1405 ml       Physical Exam     Significant Labs:   Last 24 Hours:   Recent Lab Results       11/02/17  0814 11/02/17  0711 11/02/17  0440      Immature Granulocytes   0.5     Immature Grans (Abs)   0.04     Albumin   2.3(L)     Alkaline Phosphatase   252(H)     ALT   7(L)     Anion Gap   11     AST   18     Baso #   0.04     Basophil%   0.5     Total Bilirubin   2.6  Comment:  For infants and newborns, interpretation of results should be based  on gestational age, weight and in agreement with clinical  observations.  Premature Infant recommended reference ranges:  Up to 24 hours.............<8.0 mg/dL  Up to 48 hours............<12.0 mg/dL  3-5 days..................<15.0 mg/dL  6-29 days.................<15.0 mg/dL  (H)     BUN, Bld   24(H)     Calcium   8.2(L)     Chloride   103     CO2   22(L)     Creatinine   3.3(H)     Differential Method   Automated     eGFR if    27.8(A)     eGFR if non    24.1  Comment:  Calculation used to obtain the estimated glomerular filtration  rate (eGFR) is the CKD-EPI equation.   (A)     Eos #   0.0     Eosinophil%   0.4     Glucose   69(L)     Gran #   6.1     Gran%   77.3(H)     Hematocrit  24.1(L) 23.0(L)     Hemoglobin  8.5(L) 7.5(L)     Lymph #   1.2     Lymph%   15.6(L)     Magnesium   1.7     MCH   30.4     MCHC   32.6     MCV   93     Mono #   0.5     Mono%   5.7     MPV   11.0     nRBC   0     Phosphorus   2.3(L)     Platelets   126(L)     POCT Glucose 90       Potassium   3.6     Total Protein   4.6(L)     RBC   2.47(L)     RDW   20.9(H)     Sodium   136  "    Tacrolimus Lvl   6.1  Comment:  Testing performed by Liquid Chromatography-Tandem  Mass Spectrometry (LC-MS/MS).  This test was developed and its performance characteristics  determined by [facility], Department of Pathology  and Laboratory Medicine in a manner consistent with CLIA  requirements. It has not been cleared or approved by the [place]  Food and Drug Administration.  This test is used for clinical   purposes.  It should not be regarded as investigational or for  research.       WBC   7.93           Significant Imaging: None    Assessment/Plan:      -Pt c/o anxiety and irritability s/p dialysis    -Discussed psychiatirc side effects of current medications and treatment plan.  -Pt reports anxiety related to loss of independence, coping skills and adjusting expectations discussed with pt and mother at length.  -Pt will come to [facility] to start treatment for alcohol use disorder upon discharge, will provide pt ABU literature now and ctn to follow.  -Discussed starting mediations for anxiety in the future, will hold for now.     Need for Continued Hospitalization:   No need for inpatient psychiatric hospitalization. Continue medical care as per the primary team.    Anticipated Disposition: Admitted as an Inpatient     Total time:  35 with greater than 50% of this time spent in counseling and/or coordination of care.       Edmar Win MD   Psychiatry  [facility]

## 2017-11-02 NOTE — PLAN OF CARE
Problem: Patient Care Overview  Goal: Plan of Care Review  Outcome: Ongoing (interventions implemented as appropriate)  AAOX4.  VSS.  Pt complaining of abdominal pain.  Prn pain meds being administered.  Dressing changed 3 times so far this shift to old IRISH site.  Site is leaking serous fluid.  Mother pulled self meds with 100% accuracy.  PIV is intact and free of infection.  Wound vac in place.  Bed is in lowest position, call bell is in reach, and pt instructed to call nurse when needing assistance.  Will continue to monitor.

## 2017-11-02 NOTE — SUBJECTIVE & OBJECTIVE
Scheduled Meds:   atovaquone  1,500 mg Oral Daily    bisacodyl  10 mg Oral Daily    bisacodyl  10 mg Rectal Once    docusate sodium  100 mg Oral TID PC    ergocalciferol  50,000 Units Oral Q7 Days    famotidine  20 mg Oral QHS    fluconazole  200 mg Oral Daily    heparin (porcine)  5,000 Units Subcutaneous Q8H    HYDROmorphone  0.2 mg Intravenous Once    levETIRAcetam in NaCl (iso-os)  1,000 mg Intravenous Once    [START ON 11/3/2017] levETIRAcetam  500 mg Oral BID    lorazepam        mycophenolate  1,000 mg Oral BID    polyethylene glycol  17 g Oral Daily    predniSONE  20 mg Oral Daily    sodium bicarbonate  1,300 mg Oral BID    thiamine (VITAMIN B1) IVPB  100 mg Intravenous Q24H    [START ON 11/3/2017] thiamine  100 mg Oral Daily    valganciclovir 50 mg/ml  100 mg Oral Every Mon, Wed, Fri     Continuous Infusions:   dextrose 5 % and 0.45 % NaCl 50 mL/hr at 11/02/17 1130     PRN Meds:sodium chloride 0.9%, bisacodyl, dextrose 50%, dextrose 50%, glucagon (human recombinant), glucose, glucose, heparin (porcine), insulin aspart, lorazepam, ondansetron, oxyCODONE-acetaminophen, oxyCODONE-acetaminophen, simethicone, traMADol    Review of Systems   Constitutional: Positive for activity change (decreased), appetite change (improving) and fatigue. Negative for chills, diaphoresis and fever.   HENT: Negative for congestion, mouth sores and trouble swallowing.    Eyes: Negative for visual disturbance.   Respiratory: Positive for cough. Negative for choking, shortness of breath and wheezing.    Cardiovascular: Negative for chest pain, palpitations and leg swelling.   Gastrointestinal: Positive for abdominal distention and abdominal pain. Negative for constipation, diarrhea, nausea and vomiting.   Endocrine: Negative.    Genitourinary: Negative for decreased urine volume, difficulty urinating, dysuria, hematuria and urgency.   Musculoskeletal: Negative for arthralgias, back pain and joint swelling.   Skin:  Positive for wound. Negative for color change, pallor and rash.   Allergic/Immunologic: Positive for immunocompromised state.   Neurological: Positive for weakness. Negative for dizziness, tremors, seizures, syncope and headaches.   Hematological: Bruises/bleeds easily.   Psychiatric/Behavioral: Positive for sleep disturbance. Negative for agitation, confusion, decreased concentration, dysphoric mood, hallucinations and suicidal ideas. The patient is not nervous/anxious.    All other systems reviewed and are negative.    Objective:     Vital Signs (Most Recent):  Temp: 98.4 °F (36.9 °C) (11/02/17 1222)  Pulse: 99 (11/02/17 1705)  Resp: 16 (11/02/17 1655)  BP: 118/78 (11/02/17 1705)  SpO2: 98 % (11/02/17 1705) Vital Signs (24h Range):  Temp:  [98.3 °F (36.8 °C)-99.4 °F (37.4 °C)] 98.4 °F (36.9 °C)  Pulse:  [] 99  Resp:  [16-18] 16  SpO2:  [93 %-100 %] 98 %  BP: (117-139)/(59-80) 118/78     Weight: 75.8 kg (167 lb 1.7 oz)  Body mass index is 26.17 kg/m².    Intake/Output - Last 3 Shifts       10/31 0700 - 11/01 0659 11/01 0700 - 11/02 0659 11/02 0700 - 11/03 0659    P.O. 620 720     Other  600     Total Intake(mL/kg) 620 (8.1) 1320 (17.4)     Urine (mL/kg/hr) 300 (0.2) 75 (0)     Emesis/NG output 0 (0) 0 (0)     Other 250 (0.1) 2650 (1.5)     Stool 0 (0) 0 (0)     Blood 0 (0) 0 (0)     Total Output 550 2725      Net +70 -1405             Urine Occurrence 0 x 0 x     Stool Occurrence 1 x 1 x 0 x    Emesis Occurrence 0 x 0 x           Physical Exam   Constitutional: He is oriented to person, place, and time. He appears well-developed. No distress.   Temporal and distal extremity muscle wasting   HENT:   Head: Normocephalic and atraumatic.   Mouth/Throat: No oropharyngeal exudate.   Eyes: EOM are normal. Pupils are equal, round, and reactive to light. Scleral icterus is present.   Neck: Normal range of motion. Neck supple.   Cardiovascular: Normal rate, regular rhythm and normal heart sounds.  Exam reveals no  gallop and no friction rub.    No murmur heard.  Pulmonary/Chest: Effort normal. No respiratory distress. He has no rales.   Dec R base   Abdominal: Soft. Bowel sounds are normal. He exhibits distension. There is no tenderness.   Chevron inc with wound vac to R lateral side x 7 cm  Serosang output   Musculoskeletal: Normal range of motion. He exhibits edema (abdominal edema >right lower quadrant).   Neurological: He is alert and oriented to person, place, and time.   Skin: Skin is warm and dry. Capillary refill takes 2 to 3 seconds. He is not diaphoretic.   jaundiced   Psychiatric: He has a normal mood and affect. His behavior is normal. Judgment and thought content normal.   Nursing note and vitals reviewed.    Laboratory:  Immunosuppressants         Stop Route Frequency     mycophenolate capsule 1,000 mg      -- Oral 2 times daily        CBC:     Recent Labs  Lab 11/02/17  0440 11/02/17  0711   WBC 7.93  --    RBC 2.47*  --    HGB 7.5* 8.5*   HCT 23.0* 24.1*   *  --    MCV 93  --    MCH 30.4  --    MCHC 32.6  --      CMP:     Recent Labs  Lab 11/02/17  0440   GLU 69*   CALCIUM 8.2*   ALBUMIN 2.3*   PROT 4.6*      K 3.6   CO2 22*      BUN 24*   CREATININE 3.3*   ALKPHOS 252*   ALT 7*   AST 18   BILITOT 2.6*     Tacrolimus Lvl   Date Value Ref Range Status   11/02/2017 6.1 5.0 - 15.0 ng/mL Final     Comment:     Testing performed by Liquid Chromatography-Tandem  Mass Spectrometry (LC-MS/MS).  This test was developed and its performance characteristics  determined by [facility], Department of Pathology  and Laboratory Medicine in a manner consistent with CLIA  requirements. It has not been cleared or approved by the [place]  Food and Drug Administration.  This test is used for clinical   purposes.  It should not be regarded as investigational or for  research.     10/31/2017 7.9 5.0 - 15.0 ng/mL Final     Comment:     Testing performed by Liquid Chromatography-Tandem  Mass Spectrometry  (LC-MS/MS).  This test was developed and its performance characteristics  determined by [facility], Department of Pathology  and Laboratory Medicine in a manner consistent with CLIA  requirements. It has not been cleared or approved by the [place]  Food and Drug Administration.  This test is used for clinical   purposes.  It should not be regarded as investigational or for  research.     10/30/2017 11.5 5.0 - 15.0 ng/mL Final     Comment:     Testing performed by Liquid Chromatography-Tandem  Mass Spectrometry (LC-MS/MS).  This test was developed and its performance characteristics  determined by [facility], Department of Pathology  and Laboratory Medicine in a manner consistent with CLIA  requirements. It has not been cleared or approved by the [place]  Food and Drug Administration.  This test is used for clinical   purposes.  It should not be regarded as investigational or for  research.     10/29/2017 8.8 5.0 - 15.0 ng/mL Final     Comment:     Testing performed by Liquid Chromatography-Tandem  Mass Spectrometry (LC-MS/MS).  This test was developed and its performance characteristics  determined by [facility], Department of Pathology  and Laboratory Medicine in a manner consistent with CLIA  requirements. It has not been cleared or approved by the [place]  Food and Drug Administration.  This test is used for clinical   purposes.  It should not be regarded as investigational or for  research.         Labs within the past 24 hours have been reviewed.    Diagnostic Results:  I have personally reviewed all pertinent imaging studies.
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
## 2017-11-02 NOTE — PT/OT/SLP PROGRESS
Occupational Therapy  Treatment    Jhonny Diana   MRN: [id number]   Admitting Diagnosis: Liver replaced by transplant    OT Date of Treatment: 11/02/17   OT Start Time: 1000  OT Stop Time: 1018  OT Total Time (min): 18 min    Billable Minutes:  Therapeutic Activity 18    General Precautions: Standard, fall  Orthopedic Precautions: N/A  Braces: N/A    Subjective:  Communicated with RN prior to session.  Pt agreeable to participate with therapy this date.   Pain/Comfort  Pain Rating 1: 4/10  Location - Side 1: Bilateral  Location - Orientation 1: generalized  Location 1: abdomen  Pain Addressed 1: Reposition, Cessation of Activity    Objective:  Patient found with: wound vac   Sitting on couch upon arrival   Functional Mobility:  Bed Mobility:  Rolling/Turning to Left:  (Pt found sitting on couch )  Sit to Supine: Maximum Assistance (2/2 following seizure)    Transfers:   Sit <> Stand Assistance: Activity did not occur  Bed <> Chair Technique: Stand Pivot  Bed <> Chair Transfer Assistance: Maximum Assistance (2/2 following seizure)    Activities of Daily Living:  LE Dressing Level of Assistance: Set-up Assistance, Stand by assistance (lenora/doff B socks using cross leg method; pt threaded BLE through pants while seated on couch)    Balance:   Static Sit: GOOD: Takes MODERATE challenges from all directions  Dynamic Sit: GOOD: Maintains balance through MODERATE excursions of active trunk movement    Therapeutic Activities   During treatment session, pt had seizure. Demo small jerky movement, eyes rolling back, and lasting ~20 seconds. Nursing/team notified immediately. VSS and return to bed with max A.     * Provided mother with emotional support and technique to protect pt during seizure.   * Assited with functional t/f to bed   * Pt left supine in bed with team present    AM-PAC 6 CLICK ADL   How much help from another person does this patient currently need?   1 = Unable, Total/Dependent Assistance  2 = A lot,  "Maximum/Moderate Assistance  3 = A little, Minimum/Contact Guard/Supervision  4 = None, Modified Levels/Independent    Putting on and taking off regular lower body clothing? : 3  Bathing (including washing, rinsing, drying)?: 2  Toileting, which includes using toilet, bedpan, or urinal? : 3  Putting on and taking off regular upper body clothing?: 3  Taking care of personal grooming such as brushing teeth?: 4  Eating meals?: 4  Total Score: 19     AM-PAC Raw Score CMS "G-Code Modifier Level of Impairment Assistance   6 % Total / Unable   7 - 8 CM 80 - 100% Maximal Assist   9-13 CL 60 - 80% Moderate Assist   14 - 19 CK 40 - 60% Moderate Assist   20 - 22 CJ 20 - 40% Minimal Assist   23 CI 1-20% SBA / CGA   24 CH 0% Independent/ Mod I       Patient left supine with all lines intact, call button in reach and RN notified    ASSESSMENT:  Jhonny Diana is a 27 y.o. male with a medical diagnosis of Liver replaced by transplant. Treatment session limited this date 2/2 seizure. Pt would benefit from continued skilled OT to address deficits listed below and maximize return to PLOF. OT recommending home with HH following d/c to increase overall independence and safety with ADL.     Rehab identified problem list/impairments: Rehab identified problem list/impairments: weakness, impaired self care skills, impaired endurance, impaired balance, gait instability, pain, impaired cardiopulmonary response to activity    Rehab potential is good.    Activity tolerance: Good    Discharge recommendations: Discharge Facility/Level Of Care Needs: home with home health     Barriers to discharge: Barriers to Discharge: None    Equipment recommendations: walker, rolling     GOALS:    Occupational Therapy Goals        Problem: Occupational Therapy Goal    Goal Priority Disciplines Outcome Interventions   Occupational Therapy Goal     OT, PT/OT Ongoing (interventions implemented as appropriate)    Description:  Goals to be met by:  " 11/10/17    Patient will increase functional independence with ADLs by performing:    UE Dressing with Minimal Assistance. MET (10/29/2017)  LE Dressing with Moderate Assistance with AD as needed. - MET 10/31  Revised: LE dressing with Set-up assistance and SBA  Grooming while standing at sink with Minimal Assistance.  Toileting from toilet with Minimal Assistance for hygiene and clothing management.   Supine to sit with Moderate Assistance.  Toilet transfer to toilet with Minimal Assistance.  Upper extremity exercise program per handout, with independence.                                     Plan:  Patient to be seen 5 x/week to address the above listed problems via self-care/home management, therapeutic activities, therapeutic exercises, neuromuscular re-education  Plan of Care expires: 11/26/17  Plan of Care reviewed with: patient, mother         Qi olvera, OT  11/02/2017

## 2017-11-02 NOTE — PROGRESS NOTES
[facility]  Liver Transplant  Progress Note    Patient Name: Jhonny Diana  MRN: [id number]  Admission Date: 2017  Hospital Length of Stay: 36 days  Code Status: Full Code  Primary Care Provider: Provider Notinsystem  Post-Operative Day: 14    ORGAN:   LIVER  Disease Etiology: Acute Alcoholic Hepatitis  Donor Type:    - Brain Death  CDC High Risk:   No  Donor CMV Status:   Donor CMV Status: Positive  Donor HBcAB:   Negative  Donor HCV Status:   Negative  Whole or Partial: Whole Liver  Biliary Anastomosis: End to End  Arterial Anatomy: Standard  Subjective:     History of Present Illness:  Jhonny Diana is a 26 yo male with hx of childhood asthma and heavy alcohol abuse (fifth of liquor daily since teenager, last drink 2.5 wks ago per mother) who was transferred to [facility] for higher level of care/liver transplant evaluation. Patient initially presented to OSH for a 2 mo hx of progressively worsening abdominal pain with associated fatigue, jaundice, and abdominal distention with ~20# weight gain.  Reports family hx of alcoholic cirrhosis in father. Pt was recently diagnosed with alcoholic hepatitis during his admission at OSH on -9/15. During that hospitalization, patient was given ceftriaxone for SBP ppx, prednisone for alcoholic hepatitis, and diuresed. Pt was discharged and instructed to follow up with PCP and AA within 1 wk. After his discharge, patient returned to ED 4 days after for increased weight gain and worsening abdominal pain/jaundice.     Mr. Diana was admitted to the CMICU on  for decompensated alcoholic hepatitis with high MELD score, severe HE, and LAURA. Hepatology consulted and followed to evaluate liver transplant candidacy. Pt started on albumin and lasix for LAURA and possibility for HRS. However, nephrology felt that LAURA is likely multifactorial: ATN given hypotensive episodes at OSH and toxic given severe hyperbilirubinemia; not truly HRS. Lactulose/rifaximin started  for HE and mental status slowly improved. Mr. Diana with significant ABD pain since admission with ABD distension. CT abdomen was significant for pericolonic fat stranding of right colon; no obstruction. Also with diffuse mesenteric edema. No significant ascites noted, no cholecystitis/cholelithiasis/ductal dilation. US liver with doppler showed appropriate vasculature.  He was stepped down to Hospital Medicine on 10/5.  Paracentesis was done on 10/9 with 3.3L removed and 10/12 with 1.8 removed, no infection. His diet was advanced and Dietary was consulted to help get adequate caloric intake. Listed for liver transplant 10/17 with MELD 36.    Pt received organ offer and underwent liver txp 10/19 which took place without complication.  Steroid induction used, CMV +/+.  Post operatively, LFTs/enzymes trended down nicely.  POD 5 U/S with elevated RIs and sluggish flow in HA system, otherwise ok.  Pt with LAURA prior to and s/p txp requiring dialysis.  He is tolerating HD with last HD 10/30.      Hospital Course:  Interval hx:  Pt had seized at 10am and again at 330pm.  Ativan NOT given.  Seizures last 10-15 sec, started w stuttering same syllable, followed by eyes rolling back and full body convulsing.  Pt had postictal symptoms, drowsiness, tearful and sleepy for about 45 minutes. Prograf discontinued.  Pt left floor for MRI 1600.  EEG in am.  Neuro consulted and aware of second seizure- loaded w Kepra 1 gm IV and started Kepra 500 mg PO bid.  Tolerated HD 11/1.  Made 75 cc urine over past 24 hours.  Permcath in place.  Chevron w wound vac(originally opened 10/27, vac placed 10/29), per wound care, undermining to left at least 8 cm.  Wd care packed w sponge and applied wd vac.  Wound bed w slough, difficult to debride giving increased pain.  Pt's mom reports intermittent delirium/emotional lability still occurring day and night as well as she is concerned regading transition to apt/not ready for discharge yet.  Rehab  consult on hold given seizure.  Psych consulted- no plan for medications at this time.     Scheduled Meds:   atovaquone  1,500 mg Oral Daily    bisacodyl  10 mg Oral Daily    bisacodyl  10 mg Rectal Once    docusate sodium  100 mg Oral TID PC    ergocalciferol  50,000 Units Oral Q7 Days    famotidine  20 mg Oral QHS    fluconazole  200 mg Oral Daily    heparin (porcine)  5,000 Units Subcutaneous Q8H    HYDROmorphone  0.2 mg Intravenous Once    levETIRAcetam in NaCl (iso-os)  1,000 mg Intravenous Once    [START ON 11/3/2017] levETIRAcetam  500 mg Oral BID    lorazepam        mycophenolate  1,000 mg Oral BID    polyethylene glycol  17 g Oral Daily    predniSONE  20 mg Oral Daily    sodium bicarbonate  1,300 mg Oral BID    thiamine (VITAMIN B1) IVPB  100 mg Intravenous Q24H    [START ON 11/3/2017] thiamine  100 mg Oral Daily    valganciclovir 50 mg/ml  100 mg Oral Every Mon, Wed, Fri     Continuous Infusions:   dextrose 5 % and 0.45 % NaCl 50 mL/hr at 11/02/17 1130     PRN Meds:sodium chloride 0.9%, bisacodyl, dextrose 50%, dextrose 50%, glucagon (human recombinant), glucose, glucose, heparin (porcine), insulin aspart, lorazepam, ondansetron, oxyCODONE-acetaminophen, oxyCODONE-acetaminophen, simethicone, traMADol    Review of Systems   Constitutional: Positive for activity change (decreased), appetite change (improving) and fatigue. Negative for chills, diaphoresis and fever.   HENT: Negative for congestion, mouth sores and trouble swallowing.    Eyes: Negative for visual disturbance.   Respiratory: Positive for cough. Negative for choking, shortness of breath and wheezing.    Cardiovascular: Negative for chest pain, palpitations and leg swelling.   Gastrointestinal: Positive for abdominal distention and abdominal pain. Negative for constipation, diarrhea, nausea and vomiting.   Endocrine: Negative.    Genitourinary: Negative for decreased urine volume, difficulty urinating, dysuria, hematuria and  urgency.   Musculoskeletal: Negative for arthralgias, back pain and joint swelling.   Skin: Positive for wound. Negative for color change, pallor and rash.   Allergic/Immunologic: Positive for immunocompromised state.   Neurological: Positive for weakness. Negative for dizziness, tremors, seizures, syncope and headaches.   Hematological: Bruises/bleeds easily.   Psychiatric/Behavioral: Positive for sleep disturbance. Negative for agitation, confusion, decreased concentration, dysphoric mood, hallucinations and suicidal ideas. The patient is not nervous/anxious.    All other systems reviewed and are negative.    Objective:     Vital Signs (Most Recent):  Temp: 98.4 °F (36.9 °C) (11/02/17 1222)  Pulse: 99 (11/02/17 1705)  Resp: 16 (11/02/17 1655)  BP: 118/78 (11/02/17 1705)  SpO2: 98 % (11/02/17 1705) Vital Signs (24h Range):  Temp:  [98.3 °F (36.8 °C)-99.4 °F (37.4 °C)] 98.4 °F (36.9 °C)  Pulse:  [] 99  Resp:  [16-18] 16  SpO2:  [93 %-100 %] 98 %  BP: (117-139)/(59-80) 118/78     Weight: 75.8 kg (167 lb 1.7 oz)  Body mass index is 26.17 kg/m².    Intake/Output - Last 3 Shifts       10/31 0700 - 11/01 0659 11/01 0700 - 11/02 0659 11/02 0700 - 11/03 0659    P.O. 620 720     Other  600     Total Intake(mL/kg) 620 (8.1) 1320 (17.4)     Urine (mL/kg/hr) 300 (0.2) 75 (0)     Emesis/NG output 0 (0) 0 (0)     Other 250 (0.1) 2650 (1.5)     Stool 0 (0) 0 (0)     Blood 0 (0) 0 (0)     Total Output 550 2725      Net +70 -1405             Urine Occurrence 0 x 0 x     Stool Occurrence 1 x 1 x 0 x    Emesis Occurrence 0 x 0 x           Physical Exam   Constitutional: He is oriented to person, place, and time. He appears well-developed. No distress.   Temporal and distal extremity muscle wasting   HENT:   Head: Normocephalic and atraumatic.   Mouth/Throat: No oropharyngeal exudate.   Eyes: EOM are normal. Pupils are equal, round, and reactive to light. Scleral icterus is present.   Neck: Normal range of motion. Neck supple.    Cardiovascular: Normal rate, regular rhythm and normal heart sounds.  Exam reveals no gallop and no friction rub.    No murmur heard.  Pulmonary/Chest: Effort normal. No respiratory distress. He has no rales.   Dec R base   Abdominal: Soft. Bowel sounds are normal. He exhibits distension. There is no tenderness.   Chevron inc with wound vac to R lateral side x 7 cm  Serosang output   Musculoskeletal: Normal range of motion. He exhibits edema (abdominal edema >right lower quadrant).   Neurological: He is alert and oriented to person, place, and time.   Skin: Skin is warm and dry. Capillary refill takes 2 to 3 seconds. He is not diaphoretic.   jaundiced   Psychiatric: He has a normal mood and affect. His behavior is normal. Judgment and thought content normal.   Nursing note and vitals reviewed.    Laboratory:  Immunosuppressants         Stop Route Frequency     mycophenolate capsule 1,000 mg      -- Oral 2 times daily        CBC:     Recent Labs  Lab 11/02/17  0440 11/02/17  0711   WBC 7.93  --    RBC 2.47*  --    HGB 7.5* 8.5*   HCT 23.0* 24.1*   *  --    MCV 93  --    MCH 30.4  --    MCHC 32.6  --      CMP:     Recent Labs  Lab 11/02/17  0440   GLU 69*   CALCIUM 8.2*   ALBUMIN 2.3*   PROT 4.6*      K 3.6   CO2 22*      BUN 24*   CREATININE 3.3*   ALKPHOS 252*   ALT 7*   AST 18   BILITOT 2.6*     Tacrolimus Lvl   Date Value Ref Range Status   11/02/2017 6.1 5.0 - 15.0 ng/mL Final     Comment:     Testing performed by Liquid Chromatography-Tandem  Mass Spectrometry (LC-MS/MS).  This test was developed and its performance characteristics  determined by [facility], Department of Pathology  and Laboratory Medicine in a manner consistent with CLIA  requirements. It has not been cleared or approved by the [place]  Food and Drug Administration.  This test is used for clinical   purposes.  It should not be regarded as investigational or for  research.     10/31/2017 7.9 5.0 - 15.0 ng/mL Final      Comment:     Testing performed by Liquid Chromatography-Tandem  Mass Spectrometry (LC-MS/MS).  This test was developed and its performance characteristics  determined by [facility], Department of Pathology  and Laboratory Medicine in a manner consistent with CLIA  requirements. It has not been cleared or approved by the [place]  Food and Drug Administration.  This test is used for clinical   purposes.  It should not be regarded as investigational or for  research.     10/30/2017 11.5 5.0 - 15.0 ng/mL Final     Comment:     Testing performed by Liquid Chromatography-Tandem  Mass Spectrometry (LC-MS/MS).  This test was developed and its performance characteristics  determined by [facility], Department of Pathology  and Laboratory Medicine in a manner consistent with CLIA  requirements. It has not been cleared or approved by the [place]  Food and Drug Administration.  This test is used for clinical   purposes.  It should not be regarded as investigational or for  research.     10/29/2017 8.8 5.0 - 15.0 ng/mL Final     Comment:     Testing performed by Liquid Chromatography-Tandem  Mass Spectrometry (LC-MS/MS).  This test was developed and its performance characteristics  determined by [facility], Department of Pathology  and Laboratory Medicine in a manner consistent with CLIA  requirements. It has not been cleared or approved by the [place]  Food and Drug Administration.  This test is used for clinical   purposes.  It should not be regarded as investigational or for  research.         Labs within the past 24 hours have been reviewed.    Diagnostic Results:  I have personally reviewed all pertinent imaging studies.    Assessment/Plan:     * Liver transplant 10/19/2017 for acute alcoholic hepatitis    - LFTs/enzymes trended down nicely.  Alk phos elevated POD #14- monitor closely.   - Cont bowel regimen.  - Transitioned pain control to scheduled acetaminophen + Tramadol as hallucinations with  oxycodone- tolerating well.  - Liver US POD 5 with elevated RIs and slowed flow HA system.  US 10/23 technically difficult exam w difficulty visualizing arterial system and slow flow w no tardus parvus.  Repeat liver US POD# 13 showed continued sluggish flow in HA system.    - ASA started 11/2/17.  - Wound vac placed 10/29          Seizure    - first seizure at 10 am and again at 330pm.  Ativan NOT given.  Seizures last 10-15 sec, started w stuttering same syllable, followed by eyes rolling back and full body convulsing.  Pt had postictal symptoms, drowsiness, tearful and sleepy for about 45 minutes. Prograf discontinued.  Pt left floor for MRI 1600.  EEG in am.  Neuro consulted and aware of second seizure- loaded w Kepra 1 gm IV and started Kepra 500 mg PO bid.  Waiting on MRI results.          LAURA (acute kidney injury)    - Nephrology following.  Apprec recs.  - Tolerated HD 11/01/17- tolerated well.  - Permcath placed 10/30/17  - Set up for outpt HD        Alcoholism /alcohol abuse    - Psych consulted, high risk for transplant. To complete ABU program s/p transplant          Severe malnutrition    - Dietary consulted and following.   - Continue protein powder        Prophylactic immunotherapy    - Continue prednisone taper, Cellcept.  - Will monitor for signs of toxic side effects, check daily tacrolimus troughs, and change meds accordingly.  - Discontinued Prograf today.          Long-term use of immunosuppressant medication    - See prophylactic immunotherapy          At risk for opportunistic infections    - D/C Dapsone secondary to worsening anemia and continue atovaquone for PCP prophylaxis  - Continue Valcyte for CMV prophylaxis  - Continue fluconazole for fungal prophylaxis          Physical deconditioning    - Working with PT/OT  - Rehab consult placed          Anasarca    - 2/2 liver disease, LAURA, and hypoalbuminemia  - Expect improvement once kidneys improve.    - Attempt to keep dressings dry         Acute blood loss anemia    - PRBC x 2 u 10/26 and responded nicely  - Monitor        Alcohol use disorder                  VTE Risk Mitigation         Ordered     heparin (porcine) injection 5,000 Units  Every 8 hours     Route:  Subcutaneous        10/30/17 0703     heparin (porcine) injection 1,000 Units  As needed (PRN)     Route:  Intra-Catheter        10/30/17 1649     Place sequential compression device  Until discontinued      10/20/17 0346     High Risk of VTE  Once      10/20/17 0346          The patients clinical status was discussed at multidisplinary rounds, involving transplant surgery, transplant medicine, pharmacy, nursing, nutrition, and social work    Discharge Planning:  Monitor kidney function --> may need outpt HD  Monitor HH needs vs rehab for deconditioned status      Norma Blackwood, NP  Liver Transplant  [facility]

## 2017-11-02 NOTE — HPI
"Mr. Diana is a 27 year old man with a history of significant alcohol abuse, s/p OLT on 10/19/17 and acute renal failure requiring dialysis. Neurology is consulted for seizure.     His mother reports that this morning he was sitting on the couch with OT, when he suddenly looked at her "like a deer in headlights" and started repeating the same syllable (ba, ba, ba). He then started shaking all over, and his arms were rigid at his sides. This lasted about 20 seconds, then he "went limp". She says he had a few more brief twitches afterwards. He was lethargic after the episode, somewhat confused, and his speech was not back to baseline.     The patient remembers feeling strange before the episode, and knew that he could not talk. He said he was trying to warn his mother because he feared something was about to happen, then he lost consciousness. He remembers next waking up in the bed feeling disoriented. He bit his lip but did not have bowel or bladder incontinence. He has never had an episode like this before, no febrile seizures, meningitis, encephalitis or head trauma. He has no family history of seizures. His brother has a history of breath holding spells as a child.     He was on tacrolimus but this was discontinued this morning, and he is on mycophenolate and prednisone.   "

## 2017-11-02 NOTE — ASSESSMENT & PLAN NOTE
- LFTs/enzymes trended down nicely.  Alk phos elevated POD #14- monitor closely.   - Cont bowel regimen.  - Transitioned pain control to scheduled acetaminophen + Tramadol as hallucinations with oxycodone- tolerating well.  - Liver US POD 5 with elevated RIs and slowed flow HA system.  US 10/23 technically difficult exam w difficulty visualizing arterial system and slow flow w no tardus parvus.  Repeat liver US POD# 13 showed continued sluggish flow in HA system.    - ASA started 11/2/17.  - Wound vac placed 10/29

## 2017-11-02 NOTE — ASSESSMENT & PLAN NOTE
27 year old man with alcohol abuse, s/p OLT, and LAURA requiring HD who had an episode consistent with seizure on 11/2. He is at risk for seizure due to his metabolic derangements and immunosuppressant medication, as well as self reported sleep deprivation. At this time we do not recommend starting an AED as the risks outweigh the benefits in a first time seizure. However if he has any further events he will require treatment.    -- MRI and EEG pending  -- No AED at this time  -- Seizure precautions  -- If he has another seizure, load with 1g keppra and start keppra 500mg twice daily

## 2017-11-02 NOTE — CONSULTS
PM&R consult follow up.     Attempted to evaluate patient for rehab, but patient has seizure upon my introduction.  Liver team was notified and managed event.  Due to patient not being medically stable, will sign off.  Please re-consult when patient is medically stable, participating with therapy, and ready for discharge.      LANE Marin, FNP-C  Physical Medicine & Rehabilitation   11/02/2017  Spectralink: [phone]

## 2017-11-02 NOTE — PROGRESS NOTES
[facility]  Nephrology  Progress Note    Patient Name: Jhonny Diana  MRN: [id number]  Admission Date: 9/27/2017  Hospital Length of Stay: 36 days  Attending Provider: Solis Chapa MD   Primary Care Physician: Provider Notinsystem  Principal Problem:Liver replaced by transplant    Subjective:     HPI: Jhonny Diana is a 28 yo male with pMHX of childhood asthma and alcohol abuse who was was [place] [facility] [place] worsening liver functions.  Information was obtained from family at bedside and review of outside records, given that patient is obtunded.  According to mother at bedside, patient has had multiple admissions to the hospital over the past 6 months for recurrent abdominal pain.  She reports that he has been been seen the ED and has been diagnosed with diverticulitis, UTIs, gastroenteritis, and cholecystitis.  He was never admitted to the hospital and was always discharged.  On 09/06, patient presented to OSH for worsening abdominal pain and distension.  He was diagnosed at that time with ETOH hepatitis.  He was treated with steroids, ceftriaxone for SBP prophylaxis, and diuresed at that time for volume management.  He was discharged home on the 15th, after clearance from GI,a nd was instructed to follow-up with his PCP 1 week after discharge.  He presented to the hospital on 09/25 for worsening abdominal pain and distension with 20 kg weight gain.  [place] was [place] [facility] [place] and Nephrology was consulted for LAURA.    On review of outside records, patient Scr on admission was 1.5 (with baseline at 1.0).  According to the family at the bedside, he was not urinating well at the OSH, and not responding to diuretics (spirionolactone/lasix combo).  His mother reports that patient continued to c/o abdominal and inability to urinate, but no chicas was placed until [place] [facility] [place].  Record reviews shows relative hypotension with BPs in the low 90's.  His  mother reports that the nursing staff was administrating his narcotic regimen based on his blood pressures, and at times, medication was held due to hypotension.  Since admission to hospital, his UOP has improved, responding well in Lasix 40 IV (770 ml since admission, 700 ml so far this shift at time of consultation).  Admit labs revealed a SCr of 2.6, this morning improved down to 2.4.  Bilirubin remains elevated, >14.  Abdomen remains with tight distension, unable to perform paracentesis, as no significant fluid pocket found.  Mother reports patient did not have para's done at OSH.    Labs on the morning of 09/27 with bicarb of 15.  ABG with alkalemia and primary respiratory alkalosis and anion gap acidosis.     Interval History:   Patient evaluated at bedside, hemodynamically stable, no respiratory distress, had dialysis treatment yesterday without any complaints or symptoms, Intake 1.3L and urine output 2.7L     Review of patient's allergies indicates:   Allergen Reactions    Bactrim [sulfamethoxazole-trimethoprim] Other (See Comments)     Mookie Trell Syndrome     Current Facility-Administered Medications   Medication Frequency    0.9%  NaCl infusion PRN    atovaquone suspension 1,500 mg Daily    bisacodyl EC tablet 10 mg Daily    bisacodyl suppository 10 mg Once    bisacodyl suppository 10 mg Daily PRN    dextrose 5 % and 0.45 % NaCl infusion Continuous    dextrose 50% injection 12.5 g PRN    dextrose 50% injection 25 g PRN    docusate sodium capsule 100 mg TID PC    ergocalciferol capsule 50,000 Units Q7 Days    famotidine tablet 20 mg QHS    fluconazole tablet 200 mg Daily    glucagon (human recombinant) injection 1 mg PRN    glucose chewable tablet 16 g PRN    glucose chewable tablet 24 g PRN    heparin (porcine) injection 1,000 Units PRN    heparin (porcine) injection 5,000 Units Q8H    HYDROmorphone injection 0.2 mg Once    insulin aspart pen 0-5 Units QID (AC + HS) PRN     lorazepam (ATIVAN) 2 mg/mL injection     magnesium sulfate 2g in water 50mL IVPB (premix) Once    magnesium sulfate 2g in water 50mL IVPB (premix) Once    mycophenolate capsule 1,000 mg BID    ondansetron disintegrating tablet 8 mg Q8H PRN    oxyCODONE-acetaminophen  mg per tablet 1 tablet Q4H PRN    oxyCODONE-acetaminophen 5-325 mg per tablet 1 tablet Q4H PRN    polyethylene glycol packet 17 g Daily    predniSONE tablet 20 mg Daily    simethicone chewable tablet 80 mg TID PRN    sodium bicarbonate tablet 1,300 mg BID    thiamine (B-1) 100 mg in dextrose 5 % 50 mL IVPB Q24H    [START ON 11/3/2017] thiamine tablet 100 mg Daily    traMADol tablet 50 mg Q4H PRN    valganciclovir 50 mg/ml oral solution 100 mg Every Mon, Wed, Fri       Objective:     Vital Signs (Most Recent):  Temp: 98.3 °F (36.8 °C) (11/02/17 1030)  Pulse: (!) 117 (11/02/17 1030)  Resp: 18 (11/02/17 0759)  BP: 117/66 (11/02/17 1030)  SpO2: 98 % (11/02/17 1030)  O2 Device (Oxygen Therapy): room air (11/02/17 1030) Vital Signs (24h Range):  Temp:  [98.2 °F (36.8 °C)-99.4 °F (37.4 °C)] 98.3 °F (36.8 °C)  Pulse:  [] 117  Resp:  [18] 18  SpO2:  [98 %-100 %] 98 %  BP: (109-140)/(58-80) 117/66     Weight: 75.8 kg (167 lb 1.7 oz) (11/02/17 0300)  Body mass index is 26.17 kg/m².  Body surface area is 1.89 meters squared.    I/O last 3 completed shifts:  In: 1480 [P.O.:880; Other:600]  Out: 2800 [Urine:150; Other:2650]    Physical Exam   Constitutional: He is oriented to person, place, and time. He appears well-developed and well-nourished. No distress.   HENT:   Head: Normocephalic and atraumatic.   Eyes: EOM are normal. Scleral icterus is present.   Cardiovascular: Normal rate and regular rhythm.    Pulmonary/Chest: Effort normal and breath sounds normal. He has no rales.   Abdominal: Soft. He exhibits distension.   Musculoskeletal: He exhibits edema. He exhibits no deformity.   Neurological: He is alert and oriented to person, place,  and time.   Skin: He is not diaphoretic.       Significant Labs:  ABGs: No results for input(s): PH, PCO2, HCO3, POCSATURATED, BE in the last 168 hours.  BMP:   Recent Labs  Lab 11/02/17  0440   GLU 69*      CO2 22*   BUN 24*   CREATININE 3.3*   CALCIUM 8.2*   MG 1.7     CBC:   Recent Labs  Lab 11/02/17  0440 11/02/17  0711   WBC 7.93  --    RBC 2.47*  --    HGB 7.5* 8.5*   HCT 23.0* 24.1*   *  --    MCV 93  --    MCH 30.4  --    MCHC 32.6  --      CMP:   Recent Labs  Lab 11/02/17  0440   GLU 69*   CALCIUM 8.2*   ALBUMIN 2.3*   PROT 4.6*      K 3.6   CO2 22*      BUN 24*   CREATININE 3.3*   ALKPHOS 252*   ALT 7*   AST 18   BILITOT 2.6*     All labs within the past 24 hours have been reviewed.       Assessment/Plan:     LAURA (acute kidney injury)    - likely ischemic ATN from decreased renal perfusion (volume shifts from paracentesis, anemia). Urine sediment with muddy brown casts  - underwent OLTx on 10/19; received intraop CRRT  - remains dialysis dependent at this time  - will arrange for HD tomorrow   - Still anuric   - Continue with intake and output monitoring             Win Sandoval  Nephrology  Fellow  [facility]    Pager [phone]  I have reviewed and concur with the fellow's history, physical, assessment, and plan. I have personally interviewed and examined the patient at bedside.

## 2017-11-02 NOTE — PROGRESS NOTES
Patient returned to room from MRI, vitals stable.  Patients mother at bedside.  NO acute distress noted at this time.  Will continue to monitor.

## 2017-11-02 NOTE — ASSESSMENT & PLAN NOTE
- first seizure at 10 am and again at 330pm.  Ativan NOT given.  Seizures last 10-15 sec, started w stuttering same syllable, followed by eyes rolling back and full body convulsing.  Pt had postictal symptoms, drowsiness, tearful and sleepy for about 45 minutes. Prograf discontinued.  Pt left floor for MRI 1600.  EEG in am.  Neuro consulted and aware of second seizure- loaded w Kepra 1 gm IV and started Kepra 500 mg PO bid.  Waiting on MRI results.

## 2017-11-02 NOTE — PROGRESS NOTES
Patient seized while A Jackeline RN and patients mother at bedside, patient was in bed. Patients mouth suctioned to prevent aspiration.  Patient bit lower lip during seizure.  Post seizure vitals WNL.  No ativan given.  BRENDAN Perez NP notified an at bedside.  Patient brought down to MRI with A Juhi SHEEHAN for monitoring.

## 2017-11-02 NOTE — PROGRESS NOTES
"Called into the room by rehab RN, patient was actively seizing.  Patients mother and PT were holding patient to protect him from harm.  Patients mother stated that the patient was walking around the room and housekeeping came into to clean, patient sat on the couch to "get out of the way", patient then began to shake.  When I entered the patient was posturing and seizing.  Assisted patient to the bed with help from Charge RN KELSEY Lozano.  SIENNA Blackwood NP notified.  SIENNA Blackwood NP, CHARI ABARCA, and BRENDAN Perez at the bedside.  Ativan pulled to have at bedside, suction set up.  Patients vitals checked, WNL except HR elevated in the 110s.  Consult to neuro, Mg 2grams, tele with pulse ox, MRI of the brain without contrast, and D5 1/2 NS ordered.  Patients mother counseled and educated on seizure protocol, active listening provided.Patient resting quietly in bed, monitoring closely.  "

## 2017-11-02 NOTE — ASSESSMENT & PLAN NOTE
- likely ischemic ATN from decreased renal perfusion (volume shifts from paracentesis, anemia). Urine sediment with muddy brown casts  - underwent OLTx on 10/19; received intraop CRRT  - remains dialysis dependent at this time  - will arrange for HD tomorrow   - Still anuric   - Continue with intake and output monitoring

## 2017-11-02 NOTE — SUBJECTIVE & OBJECTIVE
Interval History:   Patient evaluated at bedside, hemodynamically stable, no respiratory distress, had dialysis treatment yesterday without any complaints or symptoms, Intake 1.3L and urine output 2.7L     Review of patient's allergies indicates:   Allergen Reactions    Bactrim [sulfamethoxazole-trimethoprim] Other (See Comments)     Mookie Trell Syndrome     Current Facility-Administered Medications   Medication Frequency    0.9%  NaCl infusion PRN    atovaquone suspension 1,500 mg Daily    bisacodyl EC tablet 10 mg Daily    bisacodyl suppository 10 mg Once    bisacodyl suppository 10 mg Daily PRN    dextrose 5 % and 0.45 % NaCl infusion Continuous    dextrose 50% injection 12.5 g PRN    dextrose 50% injection 25 g PRN    docusate sodium capsule 100 mg TID PC    ergocalciferol capsule 50,000 Units Q7 Days    famotidine tablet 20 mg QHS    fluconazole tablet 200 mg Daily    glucagon (human recombinant) injection 1 mg PRN    glucose chewable tablet 16 g PRN    glucose chewable tablet 24 g PRN    heparin (porcine) injection 1,000 Units PRN    heparin (porcine) injection 5,000 Units Q8H    HYDROmorphone injection 0.2 mg Once    insulin aspart pen 0-5 Units QID (AC + HS) PRN    lorazepam (ATIVAN) 2 mg/mL injection     magnesium sulfate 2g in water 50mL IVPB (premix) Once    magnesium sulfate 2g in water 50mL IVPB (premix) Once    mycophenolate capsule 1,000 mg BID    ondansetron disintegrating tablet 8 mg Q8H PRN    oxyCODONE-acetaminophen  mg per tablet 1 tablet Q4H PRN    oxyCODONE-acetaminophen 5-325 mg per tablet 1 tablet Q4H PRN    polyethylene glycol packet 17 g Daily    predniSONE tablet 20 mg Daily    simethicone chewable tablet 80 mg TID PRN    sodium bicarbonate tablet 1,300 mg BID    thiamine (B-1) 100 mg in dextrose 5 % 50 mL IVPB Q24H    [START ON 11/3/2017] thiamine tablet 100 mg Daily    traMADol tablet 50 mg Q4H PRN    valganciclovir 50 mg/ml oral solution 100  mg Every Mon, Wed, Fri       Objective:     Vital Signs (Most Recent):  Temp: 98.3 °F (36.8 °C) (11/02/17 1030)  Pulse: (!) 117 (11/02/17 1030)  Resp: 18 (11/02/17 0759)  BP: 117/66 (11/02/17 1030)  SpO2: 98 % (11/02/17 1030)  O2 Device (Oxygen Therapy): room air (11/02/17 1030) Vital Signs (24h Range):  Temp:  [98.2 °F (36.8 °C)-99.4 °F (37.4 °C)] 98.3 °F (36.8 °C)  Pulse:  [] 117  Resp:  [18] 18  SpO2:  [98 %-100 %] 98 %  BP: (109-140)/(58-80) 117/66     Weight: 75.8 kg (167 lb 1.7 oz) (11/02/17 0300)  Body mass index is 26.17 kg/m².  Body surface area is 1.89 meters squared.    I/O last 3 completed shifts:  In: 1480 [P.O.:880; Other:600]  Out: 2800 [Urine:150; Other:2650]    Physical Exam   Constitutional: He is oriented to person, place, and time. He appears well-developed and well-nourished. No distress.   HENT:   Head: Normocephalic and atraumatic.   Eyes: EOM are normal. Scleral icterus is present.   Cardiovascular: Normal rate and regular rhythm.    Pulmonary/Chest: Effort normal and breath sounds normal. He has no rales.   Abdominal: Soft. He exhibits distension.   Musculoskeletal: He exhibits edema. He exhibits no deformity.   Neurological: He is alert and oriented to person, place, and time.   Skin: He is not diaphoretic.       Significant Labs:  ABGs: No results for input(s): PH, PCO2, HCO3, POCSATURATED, BE in the last 168 hours.  BMP:   Recent Labs  Lab 11/02/17  0440   GLU 69*      CO2 22*   BUN 24*   CREATININE 3.3*   CALCIUM 8.2*   MG 1.7     CBC:   Recent Labs  Lab 11/02/17 0440 11/02/17  0711   WBC 7.93  --    RBC 2.47*  --    HGB 7.5* 8.5*   HCT 23.0* 24.1*   *  --    MCV 93  --    MCH 30.4  --    MCHC 32.6  --      CMP:   Recent Labs  Lab 11/02/17  0440   GLU 69*   CALCIUM 8.2*   ALBUMIN 2.3*   PROT 4.6*      K 3.6   CO2 22*      BUN 24*   CREATININE 3.3*   ALKPHOS 252*   ALT 7*   AST 18   BILITOT 2.6*     All labs within the past 24 hours have been reviewed.

## 2017-11-02 NOTE — SUBJECTIVE & OBJECTIVE
Past Medical History:   Diagnosis Date    Alcoholic hepatitis with ascites     Alcoholic hepatitis with ascites     History of hematemesis 9/28/2017    Hypertension        Past Surgical History:   Procedure Laterality Date    APPENDECTOMY         Review of patient's allergies indicates:   Allergen Reactions    Bactrim [sulfamethoxazole-trimethoprim] Other (See Comments)     Mookie Trell Syndrome       No current facility-administered medications on file prior to encounter.      No current outpatient prescriptions on file prior to encounter.     Family History     None        Social History Main Topics    Smoking status: Former Smoker     Types: Cigarettes    Smokeless tobacco: Never Used    Alcohol use Yes      Comment: a fifth of liquor daily for years, cut back over last 2 months    Drug use: No    Sexual activity: Not on file     Review of Systems   Constitutional: Negative for chills and fever.   HENT: Negative for trouble swallowing.    Eyes: Negative for visual disturbance.   Respiratory: Negative for chest tightness and shortness of breath.    Cardiovascular: Positive for leg swelling. Negative for chest pain.   Gastrointestinal: Negative for abdominal pain, nausea and vomiting.   Musculoskeletal: Negative for back pain and myalgias.   Skin: Positive for color change.   Neurological: Positive for seizures. Negative for tremors, weakness and numbness.     Objective:     Vital Signs (Most Recent):  Temp: 98.4 °F (36.9 °C) (11/02/17 1222)  Pulse: 97 (11/02/17 1500)  Resp: 18 (11/02/17 1222)  BP: 123/75 (11/02/17 1222)  SpO2: 98 % (11/02/17 1222) Vital Signs (24h Range):  Temp:  [98.3 °F (36.8 °C)-99.4 °F (37.4 °C)] 98.4 °F (36.9 °C)  Pulse:  [] 97  Resp:  [18] 18  SpO2:  [98 %-100 %] 98 %  BP: (117-133)/(59-80) 123/75     Weight: 75.8 kg (167 lb 1.7 oz)  Body mass index is 26.17 kg/m².    Physical Exam   Constitutional: He is oriented to person, place, and time. He appears well-developed and  well-nourished.   HENT:   Head: Normocephalic and atraumatic.   Eyes: EOM are normal. Pupils are equal, round, and reactive to light. Scleral icterus is present.   Cardiovascular: Normal rate.    Pulmonary/Chest: Effort normal. No respiratory distress.   Musculoskeletal: He exhibits edema.   Neurological: He is oriented to person, place, and time. He has a normal Finger-Nose-Finger Test.   Reflex Scores:       Tricep reflexes are 2+ on the right side and 2+ on the left side.       Bicep reflexes are 2+ on the right side and 2+ on the left side.       Brachioradialis reflexes are 2+ on the right side and 2+ on the left side.       Patellar reflexes are 2+ on the right side and 2+ on the left side.  Skin: Skin is warm and dry.   jaundice       NEUROLOGICAL EXAMINATION:     MENTAL STATUS   Oriented to person, place, and time.   Level of consciousness: alert    CRANIAL NERVES     CN III, IV, VI   Pupils are equal, round, and reactive to light.  Extraocular motions are normal.     CN V   Facial sensation intact.     CN VII   Facial expression full, symmetric.     CN VIII   CN VIII normal.     MOTOR EXAM   Muscle bulk: normal  Overall muscle tone: normal    Strength   Strength 5/5 except as noted.   Right triceps: 4/5  Left triceps: 4/5    REFLEXES     Reflexes   Right brachioradialis: 2+  Left brachioradialis: 2+  Right biceps: 2+  Left biceps: 2+  Right triceps: 2+  Left triceps: 2+  Right patellar: 2+  Left patellar: 2+  Right plantar: equivocal  Left plantar: equivocal  Right ankle clonus: absent  Left ankle clonus: absent    SENSORY EXAM   Light touch normal.     GAIT AND COORDINATION      Coordination   Finger to nose coordination: normal    Tremor   Resting tremor: absent  Intention tremor: absent      Significant Labs:   CBC:   Recent Labs  Lab 11/01/17  0701 11/01/17  0805 11/02/17  0440 11/02/17  0711   WBC 11.90 11.09 7.93  --    HGB 8.8* 7.8* 7.5* 8.5*   HCT 26.5* 22.9* 23.0* 24.1*   * 131* 126*  --       CMP:   Recent Labs  Lab 11/01/17  0701 11/01/17  0805 11/02/17  0440   GLU 86 91 69*   * 134* 136   K 3.8 3.5 3.6    102 103   CO2 19* 22* 22*   BUN 37* 38* 24*   CREATININE 4.1* 4.0* 3.3*   CALCIUM 8.6* 8.5* 8.2*   MG 2.0 1.9 1.7   PROT 4.9* 4.7* 4.6*   ALBUMIN 2.6* 2.4* 2.3*   BILITOT 2.9* 2.7* 2.6*   ALKPHOS 195* 195* 252*   AST 19 17 18   ALT 9* 10 7*   ANIONGAP 12 10 11   EGFRNONAA 18.5* 19.1* 24.1*       Significant Imaging: I have reviewed all pertinent imaging results/findings within the past 24 hours.

## 2017-11-02 NOTE — PLAN OF CARE
Problem: Patient Care Overview  Goal: Plan of Care Review  Patient POD #14 liver transplant.  Liver enzymes trending down.  Patient remains jaundice.  AAO today.  Mg replaced.  Patient had what resembled two seizures today, Neuro on board.  MRI of brain completed awaiting results.  EEG ordered.  D5 1/2 NS at 50cc/hr started.  NS per tele, O2 sats 98% and up per cont pulse ox.  Post second seizure, keppra started.  Scant UO today.  Last dialysis Wed 2L taken off.  Dressing to right lower abd covering IRISH site, serous drainage noted, less drainage noted.  Patients mother at the bedside, very attentive to the patients involved in patients care.

## 2017-11-02 NOTE — PLAN OF CARE
Problem: Occupational Therapy Goal  Goal: Occupational Therapy Goal  Goals to be met by:  11/10/17    Patient will increase functional independence with ADLs by performing:    UE Dressing with Minimal Assistance. MET (10/29/2017)  LE Dressing with Moderate Assistance with AD as needed. - MET 10/31  Revised: LE dressing with Set-up assistance and SBA  Grooming while standing at sink with Minimal Assistance.  Toileting from toilet with Minimal Assistance for hygiene and clothing management.   Supine to sit with Moderate Assistance.  Toilet transfer to toilet with Minimal Assistance.  Upper extremity exercise program per handout, with independence.                   Outcome: Ongoing (interventions implemented as appropriate)  Continue with POC.   Qi olvera OT  11/2/2017

## 2017-11-02 NOTE — HOSPITAL COURSE
11/2: Generalized convulsion x2, 20 seconds. Loaded with keppra 1g, followed by 500mg BID  11/3: MRI with small area of diffusion restriction in missy, concerning for CPM  11/4: No acute events overnight. Well oriented to time place and person.

## 2017-11-03 PROBLEM — D84.9 IMMUNOSUPPRESSION: Status: RESOLVED | Noted: 2017-11-03 | Resolved: 2017-11-03

## 2017-11-03 PROBLEM — F10.90 ALCOHOL USE DISORDER: Status: RESOLVED | Noted: 2017-11-02 | Resolved: 2017-11-03

## 2017-11-03 PROBLEM — D84.9 IMMUNOSUPPRESSION: Status: ACTIVE | Noted: 2017-11-03

## 2017-11-03 LAB
ABO + RH BLD: NORMAL
ALBUMIN SERPL BCP-MCNC: 2.2 G/DL
ALP SERPL-CCNC: 216 U/L
ALT SERPL W/O P-5'-P-CCNC: 8 U/L
ANION GAP SERPL CALC-SCNC: 11 MMOL/L
AORTIC VALVE REGURGITATION: ABNORMAL
AST SERPL-CCNC: 16 U/L
BASOPHILS # BLD AUTO: 0.06 K/UL
BASOPHILS # BLD AUTO: 0.12 K/UL
BASOPHILS NFR BLD: 0.7 %
BASOPHILS NFR BLD: 1.1 %
BILIRUB SERPL-MCNC: 2.4 MG/DL
BLD GP AB SCN CELLS X3 SERPL QL: NORMAL
BLD PROD TYP BPU: NORMAL
BLOOD UNIT EXPIRATION DATE: NORMAL
BLOOD UNIT TYPE CODE: 5100
BLOOD UNIT TYPE: NORMAL
BUN SERPL-MCNC: 27 MG/DL
CALCIUM SERPL-MCNC: 8.3 MG/DL
CHLORIDE SERPL-SCNC: 103 MMOL/L
CO2 SERPL-SCNC: 20 MMOL/L
CODING SYSTEM: NORMAL
CREAT SERPL-MCNC: 3.9 MG/DL
DIASTOLIC DYSFUNCTION: NO
DIFFERENTIAL METHOD: ABNORMAL
DIFFERENTIAL METHOD: ABNORMAL
DISPENSE STATUS: NORMAL
EOSINOPHIL # BLD AUTO: 0 K/UL
EOSINOPHIL # BLD AUTO: 0 K/UL
EOSINOPHIL NFR BLD: 0.1 %
EOSINOPHIL NFR BLD: 0.2 %
ERYTHROCYTE [DISTWIDTH] IN BLOOD BY AUTOMATED COUNT: 20.1 %
ERYTHROCYTE [DISTWIDTH] IN BLOOD BY AUTOMATED COUNT: 20.5 %
EST. GFR  (AFRICAN AMERICAN): 22.7 ML/MIN/1.73 M^2
EST. GFR  (NON AFRICAN AMERICAN): 19.7 ML/MIN/1.73 M^2
ESTIMATED PA SYSTOLIC PRESSURE: 32.81
GLOBAL PERICARDIAL EFFUSION: ABNORMAL
GLUCOSE SERPL-MCNC: 74 MG/DL
HCT VFR BLD AUTO: 21.4 %
HCT VFR BLD AUTO: 21.9 %
HCT VFR BLD AUTO: 26.9 %
HGB BLD-MCNC: 7.1 G/DL
HGB BLD-MCNC: 7.4 G/DL
HGB BLD-MCNC: 9.2 G/DL
IMM GRANULOCYTES # BLD AUTO: 0.05 K/UL
IMM GRANULOCYTES # BLD AUTO: 0.08 K/UL
IMM GRANULOCYTES NFR BLD AUTO: 0.6 %
IMM GRANULOCYTES NFR BLD AUTO: 0.8 %
LYMPHOCYTES # BLD AUTO: 1.1 K/UL
LYMPHOCYTES # BLD AUTO: 1.2 K/UL
LYMPHOCYTES NFR BLD: 10.3 %
LYMPHOCYTES NFR BLD: 15 %
MAGNESIUM SERPL-MCNC: 1.9 MG/DL
MCH RBC QN AUTO: 30.6 PG
MCH RBC QN AUTO: 31.6 PG
MCHC RBC AUTO-ENTMCNC: 33.8 G/DL
MCHC RBC AUTO-ENTMCNC: 34.2 G/DL
MCV RBC AUTO: 89 FL
MCV RBC AUTO: 94 FL
MITRAL VALVE MOBILITY: NORMAL
MONOCYTES # BLD AUTO: 0.5 K/UL
MONOCYTES # BLD AUTO: 0.5 K/UL
MONOCYTES NFR BLD: 4.6 %
MONOCYTES NFR BLD: 6.3 %
NEUTROPHILS # BLD AUTO: 6.3 K/UL
NEUTROPHILS # BLD AUTO: 8.8 K/UL
NEUTROPHILS NFR BLD: 77.2 %
NEUTROPHILS NFR BLD: 83.1 %
NRBC BLD-RTO: 0 /100 WBC
NRBC BLD-RTO: 0 /100 WBC
PHOSPHATE SERPL-MCNC: 3 MG/DL
PLATELET # BLD AUTO: 102 K/UL
PLATELET # BLD AUTO: 141 K/UL
PMV BLD AUTO: 10.2 FL
PMV BLD AUTO: 10.2 FL
POTASSIUM SERPL-SCNC: 4.1 MMOL/L
PROT SERPL-MCNC: 4.5 G/DL
RBC # BLD AUTO: 2.34 M/UL
RBC # BLD AUTO: 3.01 M/UL
RETIRED EF AND QEF - SEE NOTES: 55 (ref 55–65)
SODIUM SERPL-SCNC: 134 MMOL/L
TACROLIMUS BLD-MCNC: 5 NG/ML
TRANS ERYTHROCYTES VOL PATIENT: NORMAL ML
TRICUSPID VALVE REGURGITATION: ABNORMAL
WBC # BLD AUTO: 10.54 K/UL
WBC # BLD AUTO: 8.2 K/UL

## 2017-11-03 PROCEDURE — 86901 BLOOD TYPING SEROLOGIC RH(D): CPT

## 2017-11-03 PROCEDURE — 63600175 PHARM REV CODE 636 W HCPCS: Performed by: NURSE PRACTITIONER

## 2017-11-03 PROCEDURE — 95816 EEG AWAKE AND DROWSY: CPT

## 2017-11-03 PROCEDURE — 63600175 PHARM REV CODE 636 W HCPCS: Performed by: INTERNAL MEDICINE

## 2017-11-03 PROCEDURE — 36415 COLL VENOUS BLD VENIPUNCTURE: CPT

## 2017-11-03 PROCEDURE — 25000003 PHARM REV CODE 250: Performed by: NURSE PRACTITIONER

## 2017-11-03 PROCEDURE — 99232 SBSQ HOSP IP/OBS MODERATE 35: CPT | Mod: ,,, | Performed by: PSYCHIATRY & NEUROLOGY

## 2017-11-03 PROCEDURE — P9021 RED BLOOD CELLS UNIT: HCPCS

## 2017-11-03 PROCEDURE — 63600175 PHARM REV CODE 636 W HCPCS: Performed by: PHYSICIAN ASSISTANT

## 2017-11-03 PROCEDURE — 86900 BLOOD TYPING SEROLOGIC ABO: CPT

## 2017-11-03 PROCEDURE — 80197 ASSAY OF TACROLIMUS: CPT

## 2017-11-03 PROCEDURE — 93306 TTE W/DOPPLER COMPLETE: CPT | Mod: 26,,, | Performed by: INTERNAL MEDICINE

## 2017-11-03 PROCEDURE — 63600175 PHARM REV CODE 636 W HCPCS: Performed by: STUDENT IN AN ORGANIZED HEALTH CARE EDUCATION/TRAINING PROGRAM

## 2017-11-03 PROCEDURE — 25000003 PHARM REV CODE 250: Performed by: STUDENT IN AN ORGANIZED HEALTH CARE EDUCATION/TRAINING PROGRAM

## 2017-11-03 PROCEDURE — 25000003 PHARM REV CODE 250: Performed by: PHYSICIAN ASSISTANT

## 2017-11-03 PROCEDURE — 99233 SBSQ HOSP IP/OBS HIGH 50: CPT | Mod: 24,,, | Performed by: NURSE PRACTITIONER

## 2017-11-03 PROCEDURE — 85018 HEMOGLOBIN: CPT

## 2017-11-03 PROCEDURE — 97608 NEG PRS WND THER NDME>50SQCM: CPT

## 2017-11-03 PROCEDURE — 25000003 PHARM REV CODE 250: Performed by: TRANSPLANT SURGERY

## 2017-11-03 PROCEDURE — 97803 MED NUTRITION INDIV SUBSEQ: CPT | Performed by: DIETITIAN, REGISTERED

## 2017-11-03 PROCEDURE — 90935 HEMODIALYSIS ONE EVALUATION: CPT | Mod: ,,, | Performed by: INTERNAL MEDICINE

## 2017-11-03 PROCEDURE — 85014 HEMATOCRIT: CPT

## 2017-11-03 PROCEDURE — 84100 ASSAY OF PHOSPHORUS: CPT

## 2017-11-03 PROCEDURE — 90935 HEMODIALYSIS ONE EVALUATION: CPT

## 2017-11-03 PROCEDURE — 25000003 PHARM REV CODE 250: Performed by: INTERNAL MEDICINE

## 2017-11-03 PROCEDURE — S5010 5% DEXTROSE AND 0.45% SALINE: HCPCS | Performed by: NURSE PRACTITIONER

## 2017-11-03 PROCEDURE — 36430 TRANSFUSION BLD/BLD COMPNT: CPT

## 2017-11-03 PROCEDURE — 85025 COMPLETE CBC W/AUTO DIFF WBC: CPT | Mod: 91

## 2017-11-03 PROCEDURE — 83735 ASSAY OF MAGNESIUM: CPT

## 2017-11-03 PROCEDURE — 63600175 PHARM REV CODE 636 W HCPCS: Performed by: TRANSPLANT SURGERY

## 2017-11-03 PROCEDURE — 80053 COMPREHEN METABOLIC PANEL: CPT

## 2017-11-03 PROCEDURE — 86920 COMPATIBILITY TEST SPIN: CPT

## 2017-11-03 PROCEDURE — 95819 EEG AWAKE AND ASLEEP: CPT | Mod: 26,,, | Performed by: PSYCHIATRY & NEUROLOGY

## 2017-11-03 PROCEDURE — 93306 TTE W/DOPPLER COMPLETE: CPT

## 2017-11-03 PROCEDURE — 20600001 HC STEP DOWN PRIVATE ROOM

## 2017-11-03 RX ORDER — LIDOCAINE HYDROCHLORIDE 10 MG/ML
1 INJECTION INFILTRATION; PERINEURAL ONCE
Status: COMPLETED | OUTPATIENT
Start: 2017-11-03 | End: 2017-11-03

## 2017-11-03 RX ORDER — SODIUM CHLORIDE 9 MG/ML
INJECTION, SOLUTION INTRAVENOUS ONCE
Status: COMPLETED | OUTPATIENT
Start: 2017-11-03 | End: 2017-11-03

## 2017-11-03 RX ORDER — HYDROMORPHONE HYDROCHLORIDE 1 MG/ML
0.5 INJECTION, SOLUTION INTRAMUSCULAR; INTRAVENOUS; SUBCUTANEOUS ONCE
Status: COMPLETED | OUTPATIENT
Start: 2017-11-03 | End: 2017-11-03

## 2017-11-03 RX ORDER — SODIUM CHLORIDE 9 MG/ML
INJECTION, SOLUTION INTRAVENOUS
Status: DISCONTINUED | OUTPATIENT
Start: 2017-11-03 | End: 2017-11-05

## 2017-11-03 RX ORDER — HYDROCODONE BITARTRATE AND ACETAMINOPHEN 500; 5 MG/1; MG/1
TABLET ORAL ONCE
Status: DISCONTINUED | OUTPATIENT
Start: 2017-11-03 | End: 2017-11-04

## 2017-11-03 RX ORDER — HYDROCODONE BITARTRATE AND ACETAMINOPHEN 500; 5 MG/1; MG/1
TABLET ORAL
Status: DISCONTINUED | OUTPATIENT
Start: 2017-11-03 | End: 2017-11-08 | Stop reason: HOSPADM

## 2017-11-03 RX ADMIN — MORPHINE 100 MG: 10 SOLUTION ORAL at 12:11

## 2017-11-03 RX ADMIN — ATOVAQUONE 1500 MG: 750 SUSPENSION ORAL at 12:11

## 2017-11-03 RX ADMIN — THIAMINE HCL (VITAMIN B1) 50 MG TABLET 100 MG: at 12:11

## 2017-11-03 RX ADMIN — ONDANSETRON 8 MG: 8 TABLET, ORALLY DISINTEGRATING ORAL at 06:11

## 2017-11-03 RX ADMIN — SODIUM BICARBONATE 650 MG TABLET 1300 MG: at 12:11

## 2017-11-03 RX ADMIN — HYDROMORPHONE HYDROCHLORIDE 0.5 MG: 1 INJECTION, SOLUTION INTRAMUSCULAR; INTRAVENOUS; SUBCUTANEOUS at 03:11

## 2017-11-03 RX ADMIN — HEPARIN SODIUM 1000 UNITS: 1000 INJECTION, SOLUTION INTRAVENOUS; SUBCUTANEOUS at 11:11

## 2017-11-03 RX ADMIN — FLUCONAZOLE 200 MG: 200 TABLET ORAL at 12:11

## 2017-11-03 RX ADMIN — SODIUM CHLORIDE 300 ML: 0.9 INJECTION, SOLUTION INTRAVENOUS at 11:11

## 2017-11-03 RX ADMIN — ERYTHROPOIETIN 5000 UNITS: 10000 INJECTION, SOLUTION INTRAVENOUS; SUBCUTANEOUS at 04:11

## 2017-11-03 RX ADMIN — OXYCODONE AND ACETAMINOPHEN 1 TABLET: 10; 325 TABLET ORAL at 11:11

## 2017-11-03 RX ADMIN — SODIUM BICARBONATE 650 MG TABLET 1300 MG: at 08:11

## 2017-11-03 RX ADMIN — MYCOPHENOLATE MOFETIL 1000 MG: 250 CAPSULE ORAL at 12:11

## 2017-11-03 RX ADMIN — LEVETIRACETAM 500 MG: 500 TABLET ORAL at 08:11

## 2017-11-03 RX ADMIN — OXYCODONE HYDROCHLORIDE AND ACETAMINOPHEN 1 TABLET: 5; 325 TABLET ORAL at 05:11

## 2017-11-03 RX ADMIN — DOCUSATE SODIUM 100 MG: 100 CAPSULE, LIQUID FILLED ORAL at 12:11

## 2017-11-03 RX ADMIN — OXYCODONE HYDROCHLORIDE AND ACETAMINOPHEN 1 TABLET: 5; 325 TABLET ORAL at 12:11

## 2017-11-03 RX ADMIN — OXYCODONE AND ACETAMINOPHEN 1 TABLET: 10; 325 TABLET ORAL at 09:11

## 2017-11-03 RX ADMIN — DEXTROSE MONOHYDRATE AND SODIUM CHLORIDE: 5; .45 INJECTION, SOLUTION INTRAVENOUS at 05:11

## 2017-11-03 RX ADMIN — LEVETIRACETAM 500 MG: 500 TABLET ORAL at 12:11

## 2017-11-03 RX ADMIN — LIDOCAINE HYDROCHLORIDE 1 ML: 10 INJECTION, SOLUTION INFILTRATION; PERINEURAL at 03:11

## 2017-11-03 RX ADMIN — THERA TABS 1 TABLET: TAB at 01:11

## 2017-11-03 RX ADMIN — HEPARIN SODIUM 5000 UNITS: 5000 INJECTION, SOLUTION INTRAVENOUS; SUBCUTANEOUS at 12:11

## 2017-11-03 RX ADMIN — OXYCODONE AND ACETAMINOPHEN 1 TABLET: 10; 325 TABLET ORAL at 04:11

## 2017-11-03 RX ADMIN — FAMOTIDINE 20 MG: 20 TABLET, FILM COATED ORAL at 08:11

## 2017-11-03 RX ADMIN — MYCOPHENOLATE MOFETIL 1000 MG: 250 CAPSULE ORAL at 08:11

## 2017-11-03 RX ADMIN — HEPARIN SODIUM 5000 UNITS: 5000 INJECTION, SOLUTION INTRAVENOUS; SUBCUTANEOUS at 05:11

## 2017-11-03 RX ADMIN — PREDNISONE 20 MG: 10 TABLET ORAL at 12:11

## 2017-11-03 RX ADMIN — HEPARIN SODIUM 5000 UNITS: 5000 INJECTION, SOLUTION INTRAVENOUS; SUBCUTANEOUS at 08:11

## 2017-11-03 NOTE — SUBJECTIVE & OBJECTIVE
Interval History:   Patient evaluated during dialysis, denies any seizure episode today, afebrile, hemodynamically stable and still anuric.    Review of patient's allergies indicates:   Allergen Reactions    Bactrim [sulfamethoxazole-trimethoprim] Other (See Comments)     Mookie Trell Syndrome     Current Facility-Administered Medications   Medication Frequency    0.9%  NaCl infusion PRN    0.9%  NaCl infusion Once    atovaquone suspension 1,500 mg Daily    bisacodyl EC tablet 10 mg Daily    bisacodyl suppository 10 mg Once    bisacodyl suppository 10 mg Daily PRN    dextrose 50% injection 12.5 g PRN    dextrose 50% injection 25 g PRN    docusate sodium capsule 100 mg TID PC    ergocalciferol capsule 50,000 Units Q7 Days    famotidine tablet 20 mg QHS    fluconazole tablet 200 mg Daily    glucagon (human recombinant) injection 1 mg PRN    glucose chewable tablet 16 g PRN    glucose chewable tablet 24 g PRN    heparin (porcine) injection 1,000 Units PRN    heparin (porcine) injection 5,000 Units Q8H    HYDROmorphone injection 0.2 mg Once    insulin aspart pen 0-5 Units QID (AC + HS) PRN    levETIRAcetam tablet 500 mg BID    mycophenolate capsule 1,000 mg BID    ondansetron disintegrating tablet 8 mg Q8H PRN    oxyCODONE-acetaminophen  mg per tablet 1 tablet Q4H PRN    oxyCODONE-acetaminophen 5-325 mg per tablet 1 tablet Q4H PRN    polyethylene glycol packet 17 g Daily    predniSONE tablet 20 mg Daily    simethicone chewable tablet 80 mg TID PRN    sodium bicarbonate tablet 1,300 mg BID    thiamine tablet 100 mg Daily    valganciclovir 50 mg/ml oral solution 100 mg Every Mon, Wed, Fri       Objective:     Vital Signs (Most Recent):  Temp: 98.8 °F (37.1 °C) (11/03/17 0815)  Pulse: 92 (11/03/17 0845)  Resp: 18 (11/03/17 0845)  BP: 129/71 (11/03/17 0845)  SpO2: 99 % (11/03/17 0815)  O2 Device (Oxygen Therapy): room air (11/03/17 0815) Vital Signs (24h Range):  Temp:  [98.2 °F (36.8  °C)-98.8 °F (37.1 °C)] 98.8 °F (37.1 °C)  Pulse:  [] 92  Resp:  [16-18] 18  SpO2:  [93 %-100 %] 99 %  BP: (117-144)/(66-85) 129/71     Weight: 74.2 kg (163 lb 9.3 oz) (11/03/17 0300)  Body mass index is 25.62 kg/m².  Body surface area is 1.87 meters squared.    I/O last 3 completed shifts:  In: 2230.8 [P.O.:1050; I.V.:1130.8; IV Piggyback:50]  Out: 25 [Urine:25]    Physical Exam   Constitutional: He is oriented to person, place, and time. He appears well-developed and well-nourished. No distress.   HENT:   Head: Normocephalic and atraumatic.   Eyes: EOM are normal. Scleral icterus is present.   Cardiovascular: Normal rate and regular rhythm.    Pulmonary/Chest: Effort normal and breath sounds normal. He has no rales.   Abdominal: Soft. He exhibits distension.   Musculoskeletal: He exhibits edema. He exhibits no deformity.   Neurological: He is alert and oriented to person, place, and time.   Skin: He is not diaphoretic.       Significant Labs:  BMP:   Recent Labs  Lab 11/03/17 0425 11/03/17 0426   GLU 74  --      --    CO2 20*  --    BUN 27*  --    CREATININE 3.9*  --    CALCIUM 8.3*  --    MG  --  1.9     CBC:   Recent Labs  Lab 11/03/17 0426 11/03/17  0740   WBC 8.20  --    RBC 2.34*  --    HGB 7.4* 7.1*   HCT 21.9* 21.4*   *  --    MCV 94  --    MCH 31.6*  --    MCHC 33.8  --      CMP:   Recent Labs  Lab 11/03/17 0425   GLU 74   CALCIUM 8.3*   ALBUMIN 2.2*   PROT 4.5*   *   K 4.1   CO2 20*      BUN 27*   CREATININE 3.9*   ALKPHOS 216*   ALT 8*   AST 16   BILITOT 2.4*     All labs within the past 24 hours have been reviewed.

## 2017-11-03 NOTE — ASSESSMENT & PLAN NOTE
- PRBC x 2 u 10/26 and responded nicely  - h/h has trended down.  HGb this am 7.1.  Old clot removed from chevron, bleeding subsided w pressure.  Transfused 1 u PRBC w appropriate response- repeat hgb 9.2.    - plan to start Procrit MWF w HD.

## 2017-11-03 NOTE — ASSESSMENT & PLAN NOTE
- Nephrology following.  Apprec recs.  - Tolerated HD 11/03/17- tolerated well.  - Permcath placed 10/30/17  - Set up for outpt HD  - of note, small pleural effusion, PA pressure 33.  Did NOT consult cardiology officially but s/w them per phone- they recommend cont to diurese, tele and monitor.  Consult as necessary.

## 2017-11-03 NOTE — HOSPITAL COURSE
11/3/17 - one episode overnight per family member concerning - patient was using tv remote and was insistent that it controlled the computer. Resolved in minutes. eeg planned for today

## 2017-11-03 NOTE — PROGRESS NOTES
Dialysis completed. Right IJ tunneled catheter flushed with normal saline, heparinized, capped and taped. Patient dialyzed for 3 hours with fluid removal net of 2 liters. Tolerated well with stable vital signs. Patient received 1 unit of blood during dialysis. Tolerated well. Report called to floor to Rizwana Hoang.

## 2017-11-03 NOTE — ASSESSMENT & PLAN NOTE
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    Re-examined patient today and noted no focal deficit.   MRI findings and exam not consistent with stroke, most likely CPM but will defer to general neuro and primary management     Will sign off, please reconsult or call with any additional concerns

## 2017-11-03 NOTE — PROGRESS NOTES
[facility]  Neurology  Progress Note    Patient Name: Jhonny Diana  MRN: [id number]  Admission Date: 9/27/2017  Hospital Length of Stay: 37 days  Code Status: Full Code   Attending Provider: Solis Chapa MD  Primary Care Physician: Provider Notinsystem   Principal Problem:Liver replaced by transplant      Subjective:     Interval History: No acute events overnight. He had 2 convulsive episodes yesterday, but none since. He was loaded with keppra and scheduled maintenance keppra. He denies any new issues.    Current Neurological Medications: Keppra    Current Facility-Administered Medications   Medication Dose Route Frequency Provider Last Rate Last Dose    0.9%  NaCl infusion (for blood administration)   Intravenous Once Norma Blackwood NP        0.9%  NaCl infusion (for blood administration)   Intravenous Q24H PRN Norma Blackwood NP        0.9%  NaCl infusion   Intravenous PRN Win Gentile MD        atovaquone suspension 1,500 mg  1,500 mg Oral Daily Geronimo Vera PA-C   1,500 mg at 11/03/17 1233    bisacodyl EC tablet 10 mg  10 mg Oral Daily Geronimo Vera PA-C   10 mg at 11/02/17 0817    bisacodyl suppository 10 mg  10 mg Rectal Once Geronimo Vera PA-C        bisacodyl suppository 10 mg  10 mg Rectal Daily PRN Jenna M. Oppenheimer, PA-C   10 mg at 11/01/17 0337    dextrose 50% injection 12.5 g  12.5 g Intravenous PRN Jazmine Bejarano DNP, NP        dextrose 50% injection 25 g  25 g Intravenous PRN Jazmine Bejarano DNP, NP        docusate sodium capsule 100 mg  100 mg Oral TID PC Geronimo Vera PA-C   100 mg at 11/03/17 1232    epoetin maurisio injection 5,000 Units  5,000 Units Intravenous Every Mon, Wed, Fri Norma Blackwood NP        ergocalciferol capsule 50,000 Units  50,000 Units Oral Q7 Days Hoda Smith MD   50,000 Units at 10/31/17 0947    famotidine tablet 20 mg  20 mg Oral QHS Solis Chapa MD   20 mg at 11/02/17 2057    fluconazole  tablet 200 mg  200 mg Oral Daily Hoda Smith MD   200 mg at 11/03/17 1232    glucagon (human recombinant) injection 1 mg  1 mg Intramuscular PRN Jazmine Bejarano DNP, NP        glucose chewable tablet 16 g  16 g Oral PRN Jazmine Bejarano DNP, NP        glucose chewable tablet 24 g  24 g Oral PRN Jazmine Bejarano DNP, NP        heparin (porcine) injection 1,000 Units  1,000 Units Intra-Catheter PRN Moon Higgins MD   1,000 Units at 11/03/17 1132    heparin (porcine) injection 5,000 Units  5,000 Units Subcutaneous Q8H Geronimo Vera PA-C   5,000 Units at 11/03/17 1238    HYDROmorphone injection 0.2 mg  0.2 mg Intravenous Once Geronimo Vera PA-C        insulin aspart pen 0-5 Units  0-5 Units Subcutaneous QID (AC + HS) PRN Jazmine Bejarano DNP, NP        levETIRAcetam tablet 500 mg  500 mg Oral BID Norma Blackwood NP   500 mg at 11/03/17 1231    lidocaine HCL 10 mg/ml (1%) injection 1 mL  1 mL Intradermal Once Norma Blackwood NP        multivitamin tablet 1 tablet  1 tablet Oral Daily Norma Blackwood NP   1 tablet at 11/03/17 1355    mycophenolate capsule 1,000 mg  1,000 mg Oral BID Hoda Smith MD   1,000 mg at 11/03/17 1232    ondansetron disintegrating tablet 8 mg  8 mg Oral Q8H PRN Norma Blackwood NP   8 mg at 11/01/17 1907    oxyCODONE-acetaminophen  mg per tablet 1 tablet  1 tablet Oral Q4H PRN Geronimo Vera PA-C   1 tablet at 11/03/17 0933    oxyCODONE-acetaminophen 5-325 mg per tablet 1 tablet  1 tablet Oral Q4H PRN Geronimo Vera PA-C   1 tablet at 11/03/17 0544    polyethylene glycol packet 17 g  17 g Oral Daily Geronimo Vera PA-C   17 g at 11/01/17 1640    predniSONE tablet 20 mg  20 mg Oral Daily Imer Badillo MD   20 mg at 11/03/17 1232    simethicone chewable tablet 80 mg  1 tablet Oral TID PRN Geronimo Vera PA-C   80 mg at 11/02/17 0817    sodium bicarbonate tablet 1,300 mg  1,300 mg Oral BID Geronimo LIEBERMAN  STAR Vera   1,300 mg at 11/03/17 1231    thiamine tablet 100 mg  100 mg Oral Daily Norma LEVIAndra Blackwood, SAMREEN   100 mg at 11/03/17 1232    valganciclovir 50 mg/ml oral solution 100 mg  100 mg Oral Every Mon, Wed, Fri Jenna M. Oppenheimer, PA-C   100 mg at 11/03/17 1236       Review of Systems   Constitutional: Positive for fatigue.   Respiratory: Negative for chest tightness and shortness of breath.    Cardiovascular: Positive for leg swelling. Negative for chest pain.   Gastrointestinal: Negative for abdominal pain.   Neurological: Negative for seizures and weakness.     Objective:     Vital Signs (Most Recent):  Temp: 98.6 °F (37 °C) (11/03/17 1245)  Pulse: 105 (11/03/17 1500)  Resp: 18 (11/03/17 1245)  BP: 125/67 (11/03/17 1245)  SpO2: 100 % (11/03/17 1245) Vital Signs (24h Range):  Temp:  [98.2 °F (36.8 °C)-99 °F (37.2 °C)] 98.6 °F (37 °C)  Pulse:  [] 105  Resp:  [16-20] 18  SpO2:  [98 %-100 %] 100 %  BP: (111-149)/(56-85) 125/67     Weight: 74.2 kg (163 lb 9.3 oz)  Body mass index is 25.62 kg/m².    Physical Exam   Constitutional: He is oriented to person, place, and time.   Eyes: EOM are normal. Pupils are equal, round, and reactive to light. Scleral icterus is present.   Cardiovascular: Normal rate.    Pulmonary/Chest: Effort normal. No respiratory distress.   Musculoskeletal: He exhibits edema.   Neurological: He is oriented to person, place, and time. He has a normal Finger-Nose-Finger Test.   Skin:   jaundice       NEUROLOGICAL EXAMINATION:     MENTAL STATUS   Oriented to person, place, and time.     CRANIAL NERVES     CN III, IV, VI   Pupils are equal, round, and reactive to light.  Extraocular motions are normal.     CN V   Facial sensation intact.     CN VII   Facial expression full, symmetric.     MOTOR EXAM        Moves all extremities     SENSORY EXAM   Light touch normal.     GAIT AND COORDINATION      Coordination   Finger to nose coordination: normal      Significant Labs:   CBC:   Recent  Labs  Lab 11/02/17 0440 11/03/17  0426 11/03/17  0740 11/03/17  1438   WBC 7.93  --  8.20  --  10.54   HGB 7.5*  < > 7.4* 7.1* 9.2*   HCT 23.0*  < > 21.9* 21.4* 26.9*   *  --  141*  --  102*   < > = values in this interval not displayed.  CMP:   Recent Labs  Lab 11/02/17 0440 11/03/17  0425 11/03/17 0426   GLU 69* 74  --     134*  --    K 3.6 4.1  --     103  --    CO2 22* 20*  --    BUN 24* 27*  --    CREATININE 3.3* 3.9*  --    CALCIUM 8.2* 8.3*  --    MG 1.7  --  1.9   PROT 4.6* 4.5*  --    ALBUMIN 2.3* 2.2*  --    BILITOT 2.6* 2.4*  --    ALKPHOS 252* 216*  --    AST 18 16  --    ALT 7* 8*  --    ANIONGAP 11 11  --    EGFRNONAA 24.1* 19.7*  --        Significant Imaging: MRI brain without contrast: small area of diffusion restriction within the missy. Otherwise no acute abnormalities.      Assessment and Plan:     * Liver transplant 10/19/2017 for acute alcoholic hepatitis    -- Managed by Transplant team          Seizure    27 year old man with alcohol abuse, s/p OLT, and LAURA requiring HD who had an episode consistent with seizure on 11/2. He is at risk for seizure due to his metabolic derangements and immunosuppressant medication, as well as self reported sleep deprivation. At this time we do not recommend starting an AED as the risks outweigh the benefits in a first time seizure. However if he has any further events he will require treatment.    MRI brain showed area of diffusion restriction in the missy consistent with central pontine myelinolysis. This is likely multifactorial in the setting of liver transplant, immunosuppression and metabolic derangements however it's unclear if this is related to his seizure.     -- EEG pending  -- Continue levetiracetam 500mg BID  -- Get levetiracetam level tomorrow morning before AM dose  -- Seizure precautions          Long-term use of immunosuppressant medication    -- lowers seizure threshold  -- on mycophenolate  -- tacrolimus d/c 11/2         LAURA (acute kidney injury)    -- risk factor for seizures  -- on HD            VTE Risk Mitigation         Ordered     heparin (porcine) injection 5,000 Units  Every 8 hours     Route:  Subcutaneous        10/30/17 0703     heparin (porcine) injection 1,000 Units  As needed (PRN)     Route:  Intra-Catheter        10/30/17 1649     Place sequential compression device  Until discontinued      10/20/17 0346     High Risk of VTE  Once      10/20/17 0346          Norma Duvall MD  Neurology  [facility]

## 2017-11-03 NOTE — PROGRESS NOTES
[facility]  Vascular Neurology  Comprehensive Stroke Center  Progress Note    Assessment/Plan:     11/3/17 - one episode overnight per family member concerning - patient was using tv remote and was insistent that it controlled the computer. Resolved in minutes. 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    Re-examined patient today and noted no focal deficit.   MRI findings and exam not consistent with stroke, most likely CPM but will defer to general neuro and primary management     Will sign off, please reconsult or call with any additional concerns        Seizure    Gen Neuro following  Keppra 500  EEG pending  Management per primary team            Neurologic Chief Complaint: abnormal mri     Subjective:     Interval History: Patient is seen for follow-up neurological assessment and treatment recommendations: one episode overnight per family member concerning - patient was using tv remote and was insistent that it controlled the computer. Resolved in minutes. eeg planned for today     HPI, Past Medical, Family, and Social History remains the same as documented in the initial encounter.     Review of Systems   Constitutional: Negative for fever.   Neurological: Negative for weakness.   Psychiatric/Behavioral: Positive for confusion.     Scheduled Meds:   sodium chloride   Intravenous Once    atovaquone  1,500 mg Oral Daily    bisacodyl  10 mg Oral Daily    bisacodyl  10 mg Rectal Once    docusate sodium  100 mg Oral TID PC    epoetin maurisio  5,000 Units Intravenous Every Mon, Wed, Fri    ergocalciferol  50,000 Units Oral Q7 Days     famotidine  20 mg Oral QHS    fluconazole  200 mg Oral Daily    heparin (porcine)  5,000 Units Subcutaneous Q8H    HYDROmorphone  0.2 mg Intravenous Once    levETIRAcetam  500 mg Oral BID    multivitamin  1 tablet Oral Daily    mycophenolate  1,000 mg Oral BID    polyethylene glycol  17 g Oral Daily    predniSONE  20 mg Oral Daily    sodium bicarbonate  1,300 mg Oral BID    thiamine  100 mg Oral Daily    valganciclovir 50 mg/ml  100 mg Oral Every Mon, Wed, Fri     Continuous Infusions:   PRN Meds:sodium chloride, sodium chloride 0.9%, bisacodyl, dextrose 50%, dextrose 50%, glucagon (human recombinant), glucose, glucose, heparin (porcine), insulin aspart, ondansetron, oxyCODONE-acetaminophen, oxyCODONE-acetaminophen, simethicone    Objective:     Vital Signs (Most Recent):  Temp: 98.9 °F (37.2 °C) (17)  Pulse: 104 (17)  Resp: 16 (17)  BP: 108/60 (17)  SpO2: 99 % (17)  BP Location: Left arm    Vital Signs Range (Last 24H):  Temp:  [98.2 °F (36.8 °C)-99 °F (37.2 °C)]   Pulse:  []   Resp:  [16-20]   BP: (108-149)/(56-85)   SpO2:  [98 %-100 %]   BP Location: Left arm    Physical Exam   Constitutional: He appears well-developed and well-nourished.   HENT:   Head: Normocephalic and atraumatic.   Cardiovascular: Normal rate.    Pulmonary/Chest: Effort normal.   Musculoskeletal: Normal range of motion.   Neurological: He is alert.   Nursing note and vitals reviewed.      Neurological Exam:   Alert and oriented  No aphasia  No dysarthria   Intact sensation   No facial drooping  EOMs intact     NIH Stroke Scale:    Level of Consciousness: 0 - alert  LOC Questions: 0 - answers both correctly  LOC Commands: 0 - performs both correctly  Best Gaze: 0 - normal  Visual: 0 - no visual loss  Facial Palsy: 0 - normal  Motor Left Arm: 0 - no drift  Motor Right Arm: 0 - no drift  Motor Left Le - no drift  Motor Right Le - no drift  Limb Ataxia: 0 -  absent  Sensory: 0 - normal  Best Language: 0 - no aphasia  Dysarthria: 0 - normal articulation  Extinction and Inattention: 0 - no neglect  NIH Stroke Scale Total: 0      Laboratory:  CMP:   Recent Labs  Lab 11/03/17  0425   CALCIUM 8.3*   ALBUMIN 2.2*   PROT 4.5*   *   K 4.1   CO2 20*      BUN 27*   CREATININE 3.9*   ALKPHOS 216*   ALT 8*   AST 16   BILITOT 2.4*     CBC:   Recent Labs  Lab 11/03/17  1438   WBC 10.54   RBC 3.01*   HGB 9.2*   HCT 26.9*   *   MCV 89   MCH 30.6   MCHC 34.2     Lipid Panel: No results for input(s): CHOL, LDLCALC, HDL, TRIG in the last 168 hours.  Coagulation: No results for input(s): INR, APTT in the last 168 hours.    Invalid input(s): PT  Platelet Aggregation Study: No results for input(s): PLTAGG, PLTAGINTERP, PLTAGREGLACO, ADPPLTAGGREG in the last 168 hours.  Hgb A1C: No results for input(s): HGBA1C in the last 168 hours.  TSH: No results for input(s): TSH in the last 168 hours.    Diagnostic Results:  I have personally reviewed:  MRI 11/2/17  Focus of diffusion restriction in the central missy. The location is somewhat atypical of infarcts from the pontine perforators.  The clinical presentation of seizures is also atypical for acute infarction.  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  Clinical correlation and short-term followup is recommended.      RIMA Cisneros  [facility]  Department of Vascular Neurology   [facility]

## 2017-11-03 NOTE — PROGRESS NOTES
[facility]  Nephrology  Progress Note    Patient Name: Jhonny Diana  MRN: [id number]  Admission Date: 9/27/2017  Hospital Length of Stay: 37 days  Attending Provider: Solis Chapa MD   Primary Care Physician: Provider Notinsystem  Principal Problem:Liver replaced by transplant    Subjective:     HPI: Jhonny Diana is a 26 yo male with pMHX of childhood asthma and alcohol abuse who was was transferred to [facility] for hepatology evaluation for worsening liver functions.  Information was obtained from family at bedside and review of outside records, given that patient is obtunded.  According to mother at bedside, patient has had multiple admissions to the hospital over the past 6 months for recurrent abdominal pain.  She reports that he has been been seen the ED and has been diagnosed with diverticulitis, UTIs, gastroenteritis, and cholecystitis.  He was never admitted to the hospital and was always discharged.  On 09/06, patient presented to OSH for worsening abdominal pain and distension.  He was diagnosed at that time with ETOH hepatitis.  He was treated with steroids, ceftriaxone for SBP prophylaxis, and diuresed at that time for volume management.  He was discharged home on the 15th, after clearance from GI,a nd was instructed to follow-up with his PCP 1 week after discharge.  He presented to the hospital on 09/25 for worsening abdominal pain and distension with 20 kg weight gain.  He was transferred to [facility] for further care and Nephrology was consulted for LAURA.    On review of outside records, patient Scr on admission was 1.5 (with baseline at 1.0).  According to the family at the bedside, he was not urinating well at the OSH, and not responding to diuretics (spirionolactone/lasix combo).  His mother reports that patient continued to c/o abdominal and inability to urinate, but no chicas was placed until transfer to [facility] yesterday.  Record reviews shows relative hypotension with BPs in the low 90's.  His  mother reports that the nursing staff was administrating his narcotic regimen based on his blood pressures, and at times, medication was held due to hypotension.  Since admission to hospital, his UOP has improved, responding well in Lasix 40 IV (770 ml since admission, 700 ml so far this shift at time of consultation).  Admit labs revealed a SCr of 2.6, this morning improved down to 2.4.  Bilirubin remains elevated, >14.  Abdomen remains with tight distension, unable to perform paracentesis, as no significant fluid pocket found.  Mother reports patient did not have para's done at OSH.    Labs on the morning of 09/27 with bicarb of 15.  ABG with alkalemia and primary respiratory alkalosis and anion gap acidosis.     Interval History:   Patient evaluated during dialysis, denies any seizure episode today, afebrile, hemodynamically stable and still anuric.    Review of patient's allergies indicates:   Allergen Reactions    Bactrim [sulfamethoxazole-trimethoprim] Other (See Comments)     Mookie Trell Syndrome     Current Facility-Administered Medications   Medication Frequency    0.9%  NaCl infusion PRN    0.9%  NaCl infusion Once    atovaquone suspension 1,500 mg Daily    bisacodyl EC tablet 10 mg Daily    bisacodyl suppository 10 mg Once    bisacodyl suppository 10 mg Daily PRN    dextrose 50% injection 12.5 g PRN    dextrose 50% injection 25 g PRN    docusate sodium capsule 100 mg TID PC    ergocalciferol capsule 50,000 Units Q7 Days    famotidine tablet 20 mg QHS    fluconazole tablet 200 mg Daily    glucagon (human recombinant) injection 1 mg PRN    glucose chewable tablet 16 g PRN    glucose chewable tablet 24 g PRN    heparin (porcine) injection 1,000 Units PRN    heparin (porcine) injection 5,000 Units Q8H    HYDROmorphone injection 0.2 mg Once    insulin aspart pen 0-5 Units QID (AC + HS) PRN    levETIRAcetam tablet 500 mg BID    mycophenolate capsule 1,000 mg BID    ondansetron  disintegrating tablet 8 mg Q8H PRN    oxyCODONE-acetaminophen  mg per tablet 1 tablet Q4H PRN    oxyCODONE-acetaminophen 5-325 mg per tablet 1 tablet Q4H PRN    polyethylene glycol packet 17 g Daily    predniSONE tablet 20 mg Daily    simethicone chewable tablet 80 mg TID PRN    sodium bicarbonate tablet 1,300 mg BID    thiamine tablet 100 mg Daily    valganciclovir 50 mg/ml oral solution 100 mg Every Mon, Wed, Fri       Objective:     Vital Signs (Most Recent):  Temp: 98.8 °F (37.1 °C) (11/03/17 0815)  Pulse: 92 (11/03/17 0845)  Resp: 18 (11/03/17 0845)  BP: 129/71 (11/03/17 0845)  SpO2: 99 % (11/03/17 0815)  O2 Device (Oxygen Therapy): room air (11/03/17 0815) Vital Signs (24h Range):  Temp:  [98.2 °F (36.8 °C)-98.8 °F (37.1 °C)] 98.8 °F (37.1 °C)  Pulse:  [] 92  Resp:  [16-18] 18  SpO2:  [93 %-100 %] 99 %  BP: (117-144)/(66-85) 129/71     Weight: 74.2 kg (163 lb 9.3 oz) (11/03/17 0300)  Body mass index is 25.62 kg/m².  Body surface area is 1.87 meters squared.    I/O last 3 completed shifts:  In: 2230.8 [P.O.:1050; I.V.:1130.8; IV Piggyback:50]  Out: 25 [Urine:25]    Physical Exam   Constitutional: He is oriented to person, place, and time. He appears well-developed and well-nourished. No distress.   HENT:   Head: Normocephalic and atraumatic.   Eyes: EOM are normal. Scleral icterus is present.   Cardiovascular: Normal rate and regular rhythm.    Pulmonary/Chest: Effort normal and breath sounds normal. He has no rales.   Abdominal: Soft. He exhibits distension.   Musculoskeletal: He exhibits edema. He exhibits no deformity.   Neurological: He is alert and oriented to person, place, and time.   Skin: He is not diaphoretic.       Significant Labs:  BMP:   Recent Labs  Lab 11/03/17 0425 11/03/17 0426   GLU 74  --      --    CO2 20*  --    BUN 27*  --    CREATININE 3.9*  --    CALCIUM 8.3*  --    MG  --  1.9     CBC:   Recent Labs  Lab 11/03/17 0426 11/03/17  0740   WBC 8.20  --    RBC  2.34*  --    HGB 7.4* 7.1*   HCT 21.9* 21.4*   *  --    MCV 94  --    MCH 31.6*  --    MCHC 33.8  --      CMP:   Recent Labs  Lab 11/03/17  0425   GLU 74   CALCIUM 8.3*   ALBUMIN 2.2*   PROT 4.5*   *   K 4.1   CO2 20*      BUN 27*   CREATININE 3.9*   ALKPHOS 216*   ALT 8*   AST 16   BILITOT 2.4*     All labs within the past 24 hours have been reviewed.       Assessment/Plan:     LAURA (acute kidney injury)    - likely ischemic ATN from decreased renal perfusion (volume shifts from paracentesis, anemia). Urine sediment with muddy brown casts  - underwent OLTx on 10/19; received intraop CRRT  - remains dialysis dependent at this time  - Patient  Will have dialysis today for removal of toxins and excess fluid, duration of 3 hours and UF 2-3 L as tolerated by patient, maintain a MAP>65, will adjust bath to electrolytes.  - Still anuric   - Continue with intake and output monitoring  - Yesterday presented with 2 episodes of seizure, follow up by neurologist               Win Sandoval  Nephrology  Fellow  [facility]    Pager [phone]

## 2017-11-03 NOTE — PROGRESS NOTES
[facility]  Adult Nutrition  Progress Note    SUMMARY     Recommendations    Recommendation/Intervention: PO intake 50%, refusing ONS. Continue current renal diet, encouraging high protein at each meal. Renal diet discussed with patient and mother. Post tx nutrition ed complete. RD following.     Goals: PO intake to meet 85% of EEN/EPN  Nutrition Goal Status: progressing towards goal  Communication of RD Recs: reviewed with RN      Reason for Assessment    Reason for Assessment: RD follow-up  Diagnosis: transplant/postoperative complications (liver transplant POD# 13)  Relevent Medical History: ETOH abuse, severe malnutrition, LAURA   Interdisciplinary Rounds: attended  Level of Care:  (F/U x1 weekly)  General Information Comments: Pt not in room this am (HD), room busy on 2nd visit; pt now with 2 seizures yesterday, 75% PO intake, HD 3x/week,     Nutrition Discharge Planning: PO intake will meet 85% of EEN/EPN    Nutrition Prescription Ordered    Current Diet Order: Regular Renal   Nutrition Order Comments: -  Current Nutrition Support Formula Ordered: Discontinued  Current Nutrition Support Rate Ordered: 0 (ml)  Current Nutrition Support Frequency Ordered: mL/hr  Oral Nutrition Supplement: beneprotein     Evaluation of Received Nutrients/Fluid Intake    Enteral Calories (kcal): 0  Enteral Protein (gm): 0  Enteral (Free Water) Fluid (mL): 0      Energy Calories Required: not meeting needs  % Kcal Needs: 0      Protein Required: not meeting needs  % Protein Needs: 0     IV Fluid (mL): 7390      I/O: + 2L x 24hrs, -8L since admit         Fluid Required: not meeting needs  Comments: LBM: 11/1  Tolerance: tolerating  % Intake of Estimated Energy Needs: 50 - 75 %  % Meal Intake: 50%     Nutrition Risk Screen     Nutrition Risk Screen: dysphagia or difficulty swallowing    Nutrition/Diet History    Patient Reported Diet/Restrictions/Preferences: renal  Typical Food/Fluid Intake: PT eating ~50-75% of  "meals depending on where its from.   Food Preferences: No cultural or Orthodox preferences noted at this time        Factors Affecting Nutritional Intake: decreased appetite        Labs/Tests/Procedures/Meds     Pertinent Labs Reviewed: reviewed  Pertinent Labs Comments: Na 134, BUN 27, Cr 3.9, gfr 19.7, Ca 8.3, TBili 2.4, vit D 4  Pertinent Medications Reviewed: reviewed  Pertinent Medications Comments: bisacodyl, ergocalciferol, heparin, prednisone, thiamin    Physical Findings    Overall Physical Appearance: overweight, edematous  Tubes:  (none)  Oral/Mouth Cavity: WDL  Skin: incision (bruises)    Anthropometrics    Temp: 98.6 °F (37 °C)     Height: 5' 7" (170.2 cm)  Weight Method: Bed Scale  Weight: 74.2 kg (163 lb 9.3 oz)  Ideal Body Weight (IBW), Male: 148 lb     % Ideal Body Weight, Male (lb): 113.36 lb     BMI (Calculated): 26.3  BMI Grade: 25 - 29.9 - overweight     Usual Body Weight (UBW), kg:  (2/2 fluid shifts)           Estimated/Assessed Needs    Weight Used For Calorie Calculations: 76.1 kg (167 lb 12.3 oz)   Height (cm): 170.2 cm  Energy Calorie Requirements (kcal): 2112  Energy Need Method: Deputy-St Jeor (x1.25)        RMR (Deputy-St. Jeor Equation): 1689.62      Weight Used For Protein Calculations: 76.1 kg (167 lb 12.3 oz)  Protein Requirements: 91.5- 99 g/day (1.2-1.3 g/kg)    Fluid Requirements (mL): 1 mL/kcal or per MD  Fluid Need Method: RDA Method        RDA Method (mL): 2112               Assessment and Plan    * Liver transplant 10/19/2017 for acute alcoholic hepatitis    Nutrition Diagnosis:  Increased nutrient needs    Related to (etiology):   protein for healing and muscle maintenance    Signs and Symptoms (as evidenced by):   S/p OLTx 10/19 and poor intake prior to sgy    Interventions/Recommendations (treatment strategy):  See RD recs above    Nutrition Diagnosis Status:   Continues              Monitor and Evaluation    Food and Nutrient Intake: energy intake, food and beverage " intake  Food and Nutrient Adminstration: diet order  Knowledge/Beliefs/Attitudes: food and nutrition knowledge/skill  Physical Activity and Function: nutrition-related ADLs and IADLs  Anthropometric Measurements: weight, weight change  Biochemical Data, Medical Tests and Procedures: electrolyte and renal panel, gastrointestinal profile, glucose/endocrine profile, inflammatory profile, lipid profile  Nutrition-Focused Physical Findings: overall appearance, skin    Nutrition Risk    Level of Risk: other (see comments) (F/U 2x weekly)    Nutrition Follow-Up    RD Follow-up?: Yes

## 2017-11-03 NOTE — PLAN OF CARE
Problem: Patient Care Overview  Goal: Plan of Care Review  Outcome: Ongoing (interventions implemented as appropriate)  AAOX4.  VSS.  Pt receiving prn pain meds for pain with moderate pain relief.  No seizures noted so far this shift.  EEG to be done today.  D5 1/2 NS infusing continuously at 50cc/hr.  PIV is intact and free of infection.  Mother pulled self meds with 100% accuracy.  Gave loading dose of IV keppra at beginning of shift.  Bed is in lowest position, call bell is in reach, and pt instructed to call nurse when needing assistance.  Will continue to monitor.

## 2017-11-03 NOTE — SUBJECTIVE & OBJECTIVE
Subjective:     Interval History: No acute events overnight. He had 2 convulsive episodes yesterday, but none since. He was loaded with keppra and scheduled maintenance keppra. He denies any new issues.    Current Neurological Medications: Keppra    Current Facility-Administered Medications   Medication Dose Route Frequency Provider Last Rate Last Dose    0.9%  NaCl infusion (for blood administration)   Intravenous Once Norma Blackwood NP        0.9%  NaCl infusion (for blood administration)   Intravenous Q24H PRN Norma Blackwood NP        0.9%  NaCl infusion   Intravenous PRN Win Gentile MD        atovaquone suspension 1,500 mg  1,500 mg Oral Daily Geronimo Vera PA-C   1,500 mg at 11/03/17 1233    bisacodyl EC tablet 10 mg  10 mg Oral Daily Geronimo Vera PA-C   10 mg at 11/02/17 0817    bisacodyl suppository 10 mg  10 mg Rectal Once Geronimo Vera PA-C        bisacodyl suppository 10 mg  10 mg Rectal Daily PRN Jenna M. Oppenheimer, PA-C   10 mg at 11/01/17 0337    dextrose 50% injection 12.5 g  12.5 g Intravenous PRN Jazmine Bejarano DNP, NP        dextrose 50% injection 25 g  25 g Intravenous PRN Jazmine Bejarano DNP, NP        docusate sodium capsule 100 mg  100 mg Oral TID PC Geronimo Vera PA-C   100 mg at 11/03/17 1232    epoetin maurisio injection 5,000 Units  5,000 Units Intravenous Every Mon, Wed, Fri Norma Blackwood NP        ergocalciferol capsule 50,000 Units  50,000 Units Oral Q7 Days Hoda Smith MD   50,000 Units at 10/31/17 0947    famotidine tablet 20 mg  20 mg Oral QHS Solis Chapa MD   20 mg at 11/02/17 2057    fluconazole tablet 200 mg  200 mg Oral Daily Hoda Smith MD   200 mg at 11/03/17 1232    glucagon (human recombinant) injection 1 mg  1 mg Intramuscular PRN Jazmine Bejarano DNP, NP        glucose chewable tablet 16 g  16 g Oral PRN Jazmine Bejarano, MATILDE, NP        glucose chewable tablet 24 g  24 g Oral GREG OLIVIER  MATILDE Bejarano, NP        heparin (porcine) injection 1,000 Units  1,000 Units Intra-Catheter PRN Moon Higgins MD   1,000 Units at 11/03/17 1132    heparin (porcine) injection 5,000 Units  5,000 Units Subcutaneous Q8H Geronimo Vera PA-C   5,000 Units at 11/03/17 1238    HYDROmorphone injection 0.2 mg  0.2 mg Intravenous Once Geronimo Vera PA-C        insulin aspart pen 0-5 Units  0-5 Units Subcutaneous QID (AC + HS) PRN Jazmine Bejarano DNP, NP        levETIRAcetam tablet 500 mg  500 mg Oral BID Norma Blackwood NP   500 mg at 11/03/17 1231    lidocaine HCL 10 mg/ml (1%) injection 1 mL  1 mL Intradermal Once Norma Blackwood NP        multivitamin tablet 1 tablet  1 tablet Oral Daily Norma Blackwood NP   1 tablet at 11/03/17 1355    mycophenolate capsule 1,000 mg  1,000 mg Oral BID Hoda Smith MD   1,000 mg at 11/03/17 1232    ondansetron disintegrating tablet 8 mg  8 mg Oral Q8H PRN Norma Blackwood NP   8 mg at 11/01/17 1907    oxyCODONE-acetaminophen  mg per tablet 1 tablet  1 tablet Oral Q4H PRN Geronimo Vera PA-C   1 tablet at 11/03/17 0933    oxyCODONE-acetaminophen 5-325 mg per tablet 1 tablet  1 tablet Oral Q4H PRN Geronimo Vera PA-C   1 tablet at 11/03/17 0544    polyethylene glycol packet 17 g  17 g Oral Daily Geronimo Vera PA-C   17 g at 11/01/17 1640    predniSONE tablet 20 mg  20 mg Oral Daily Imer Badillo MD   20 mg at 11/03/17 1232    simethicone chewable tablet 80 mg  1 tablet Oral TID PRN Geronimo Vera PA-C   80 mg at 11/02/17 0817    sodium bicarbonate tablet 1,300 mg  1,300 mg Oral BID Geronimo Vera PA-C   1,300 mg at 11/03/17 1231    thiamine tablet 100 mg  100 mg Oral Daily Norma Blackwood NP   100 mg at 11/03/17 1232    valganciclovir 50 mg/ml oral solution 100 mg  100 mg Oral Every Mon, Wed, Fri Jenna M. Oppenheimer, PA-C   100 mg at 11/03/17 1236       Review of Systems   Constitutional: Positive for  fatigue.   Respiratory: Negative for chest tightness and shortness of breath.    Cardiovascular: Positive for leg swelling. Negative for chest pain.   Gastrointestinal: Negative for abdominal pain.   Neurological: Negative for seizures and weakness.     Objective:     Vital Signs (Most Recent):  Temp: 98.6 °F (37 °C) (11/03/17 1245)  Pulse: 105 (11/03/17 1500)  Resp: 18 (11/03/17 1245)  BP: 125/67 (11/03/17 1245)  SpO2: 100 % (11/03/17 1245) Vital Signs (24h Range):  Temp:  [98.2 °F (36.8 °C)-99 °F (37.2 °C)] 98.6 °F (37 °C)  Pulse:  [] 105  Resp:  [16-20] 18  SpO2:  [98 %-100 %] 100 %  BP: (111-149)/(56-85) 125/67     Weight: 74.2 kg (163 lb 9.3 oz)  Body mass index is 25.62 kg/m².    Physical Exam   Constitutional: He is oriented to person, place, and time.   Eyes: EOM are normal. Pupils are equal, round, and reactive to light. Scleral icterus is present.   Cardiovascular: Normal rate.    Pulmonary/Chest: Effort normal. No respiratory distress.   Musculoskeletal: He exhibits edema.   Neurological: He is oriented to person, place, and time. He has a normal Finger-Nose-Finger Test.   Skin:   jaundice       NEUROLOGICAL EXAMINATION:     MENTAL STATUS   Oriented to person, place, and time.     CRANIAL NERVES     CN III, IV, VI   Pupils are equal, round, and reactive to light.  Extraocular motions are normal.     CN V   Facial sensation intact.     CN VII   Facial expression full, symmetric.     MOTOR EXAM        Moves all extremities     SENSORY EXAM   Light touch normal.     GAIT AND COORDINATION      Coordination   Finger to nose coordination: normal      Significant Labs:   CBC:   Recent Labs  Lab 11/02/17  0440  11/03/17  0426 11/03/17  0740 11/03/17  1438   WBC 7.93  --  8.20  --  10.54   HGB 7.5*  < > 7.4* 7.1* 9.2*   HCT 23.0*  < > 21.9* 21.4* 26.9*   *  --  141*  --  102*   < > = values in this interval not displayed.  CMP:   Recent Labs  Lab 11/02/17  0440 11/03/17  0425 11/03/17  0426   GLU 69*  74  --     134*  --    K 3.6 4.1  --     103  --    CO2 22* 20*  --    BUN 24* 27*  --    CREATININE 3.3* 3.9*  --    CALCIUM 8.2* 8.3*  --    MG 1.7  --  1.9   PROT 4.6* 4.5*  --    ALBUMIN 2.3* 2.2*  --    BILITOT 2.6* 2.4*  --    ALKPHOS 252* 216*  --    AST 18 16  --    ALT 7* 8*  --    ANIONGAP 11 11  --    EGFRNONAA 24.1* 19.7*  --        Significant Imaging: MRI brain without contrast: small area of diffusion restriction within the missy. Otherwise no acute abnormalities.

## 2017-11-03 NOTE — SUBJECTIVE & OBJECTIVE
Past Medical History:   Diagnosis Date    Alcoholic hepatitis with ascites     Alcoholic hepatitis with ascites     History of hematemesis 9/28/2017    Hypertension      Past Surgical History:   Procedure Laterality Date    APPENDECTOMY       History reviewed. No pertinent family history.  Social History   Substance Use Topics    Smoking status: Former Smoker     Types: Cigarettes    Smokeless tobacco: Never Used    Alcohol use Yes      Comment: a fifth of liquor daily for years, cut back over last 2 months     Review of patient's allergies indicates:   Allergen Reactions    Bactrim [sulfamethoxazole-trimethoprim] Other (See Comments)     Mookie Trell Syndrome     Medications: I have reviewed the current medication administration record.    No prescriptions prior to admission.       Review of Systems   Constitutional: Negative for appetite change and fever.   HENT: Negative for nosebleeds and sneezing.    Eyes: Negative for discharge and visual disturbance.   Respiratory: Negative for cough and stridor.    Cardiovascular: Positive for leg swelling. Negative for chest pain.   Gastrointestinal: Negative for abdominal pain and vomiting.   Genitourinary: Negative for dysuria and frequency.   Musculoskeletal: Negative for joint swelling and neck stiffness.   Skin: Negative for pallor and rash.   Neurological: Positive for seizures. Negative for facial asymmetry, weakness and numbness.   Psychiatric/Behavioral: Negative for agitation, behavioral problems and confusion.     Objective:     Vital Signs (Most Recent):  Temp: 98.6 °F (37 °C) (11/02/17 1736)  Pulse: 104 (11/02/17 1736)  Resp: 16 (11/02/17 1736)  BP: 119/74 (11/02/17 1736)  SpO2: 99 % (11/02/17 1736)    Vital Signs Range (Last 24H):  Temp:  [98.3 °F (36.8 °C)-99 °F (37.2 °C)]   Pulse:  []   Resp:  [16-18]   BP: (117-139)/(59-78)   SpO2:  [93 %-100 %]     Physical Exam   Constitutional: He is oriented to person, place, and time. He appears  well-developed and well-nourished. No distress.   HENT:   Head: Normocephalic and atraumatic.   Eyes: EOM are normal. Pupils are equal, round, and reactive to light. Scleral icterus is present.   Neck: No tracheal deviation present.   Cardiovascular: Normal rate and regular rhythm.    Pulmonary/Chest: Effort normal. No stridor. No respiratory distress.   Musculoskeletal: Normal range of motion. He exhibits edema. He exhibits no tenderness.   Neurological: He is alert and oriented to person, place, and time. No cranial nerve deficit or sensory deficit. Coordination normal.   Skin: Skin is warm and dry.   Psychiatric: He has a normal mood and affect. His behavior is normal. Judgment and thought content normal.       Neurological Exam:   LOC: alert and follows requests  Language: No aphasia  Speech: No dysarthria  Orientation: Person, Place, Time  Memory: Recent memory intact, Remote memory intact, Age correct, Month correct  Visual Fields (CN II): Full  EOM (CN III, IV, VI): Full/intact  Pupils (CN III, IV, VI): PERRL  Facial Sensation (CN V): Symmetric  Facial Movement (CN VII): symmetric facial expression  Tongue (CN XII): to midline  Motor*: 5/5 in all extremities  Cerebellar*: Finger/Nose Normal  Sensation: intact to light touch    NIH Stroke Scale:    Level of Consciousness: 0 - alert  LOC Questions: 0 - answers both correctly  LOC Commands: 0 - performs both correctly  Best Gaze: 0 - normal  Visual: 0 - no visual loss  Facial Palsy: 0 - normal  Motor Left Arm: 0 - no drift  Motor Right Arm: 0 - no drift  Motor Left Le - no drift  Motor Right Le - no drift  Limb Ataxia: 0 - absent  Sensory: 0 - normal  Best Language: 0 - no aphasia  Dysarthria: 0 - normal articulation  Extinction and Inattention: 0 - no neglect  NIH Stroke Scale Total: 0  Modified Dimitry Scale:   Timeline: Prior to symptoms onset  Modified Marlborough Score: 2 - slight disability        Laboratory:  CMP:   Recent Labs  Lab 17  8453    CALCIUM 8.2*   ALBUMIN 2.3*   PROT 4.6*      K 3.6   CO2 22*      BUN 24*   CREATININE 3.3*   ALKPHOS 252*   ALT 7*   AST 18   BILITOT 2.6*     CBC:   Recent Labs  Lab 11/02/17  0440 11/02/17  0711   WBC 7.93  --    RBC 2.47*  --    HGB 7.5* 8.5*   HCT 23.0* 24.1*   *  --    MCV 93  --    MCH 30.4  --    MCHC 32.6  --      Lipid Panel: No results for input(s): CHOL, LDLCALC, HDL, TRIG in the last 168 hours.     Coagulation: No results for input(s): INR, APTT in the last 168 hours.    Invalid input(s): PT  Platelet Aggregation Study: No results for input(s): PLTAGG, PLTAGINTERP, PLTAGREGLACO, ADPPLTAGGREG in the last 168 hours.     Hgb A1C: No results for input(s): HGBA1C in the last 168 hours.     TSH: No results for input(s): TSH in the last 168 hours.    Diagnostic Results:    MRI Brain 11/2/17    Focus of diffusion restriction in the central missy. The location is somewhat atypical of infarcts from the pontine perforators.  The clinical presentation of seizures is also atypical for acute infarction.  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  Clinical correlation and short-term followup is recommended.    EEG pending

## 2017-11-03 NOTE — SUBJECTIVE & OBJECTIVE
Neurologic Chief Complaint: abnormal mri     Subjective:     Interval History: Patient is seen for follow-up neurological assessment and treatment recommendations: one episode overnight per family member concerning - patient was using tv remote and was insistent that it controlled the computer. Resolved in minutes. eeg planned for today     HPI, Past Medical, Family, and Social History remains the same as documented in the initial encounter.     Review of Systems   Constitutional: Negative for fever.   Neurological: Negative for weakness.   Psychiatric/Behavioral: Positive for confusion.     Scheduled Meds:   sodium chloride   Intravenous Once    atovaquone  1,500 mg Oral Daily    bisacodyl  10 mg Oral Daily    bisacodyl  10 mg Rectal Once    docusate sodium  100 mg Oral TID PC    epoetin maurisio  5,000 Units Intravenous Every Mon, Wed, Fri    ergocalciferol  50,000 Units Oral Q7 Days    famotidine  20 mg Oral QHS    fluconazole  200 mg Oral Daily    heparin (porcine)  5,000 Units Subcutaneous Q8H    HYDROmorphone  0.2 mg Intravenous Once    levETIRAcetam  500 mg Oral BID    multivitamin  1 tablet Oral Daily    mycophenolate  1,000 mg Oral BID    polyethylene glycol  17 g Oral Daily    predniSONE  20 mg Oral Daily    sodium bicarbonate  1,300 mg Oral BID    thiamine  100 mg Oral Daily    valganciclovir 50 mg/ml  100 mg Oral Every Mon, Wed, Fri     Continuous Infusions:   PRN Meds:sodium chloride, sodium chloride 0.9%, bisacodyl, dextrose 50%, dextrose 50%, glucagon (human recombinant), glucose, glucose, heparin (porcine), insulin aspart, ondansetron, oxyCODONE-acetaminophen, oxyCODONE-acetaminophen, simethicone    Objective:     Vital Signs (Most Recent):  Temp: 98.9 °F (37.2 °C) (11/03/17 1625)  Pulse: 104 (11/03/17 1625)  Resp: 16 (11/03/17 1625)  BP: 108/60 (11/03/17 1625)  SpO2: 99 % (11/03/17 1625)  BP Location: Left arm    Vital Signs Range (Last 24H):  Temp:  [98.2 °F (36.8 °C)-99 °F (37.2  °C)]   Pulse:  []   Resp:  [16-20]   BP: (108-149)/(56-85)   SpO2:  [98 %-100 %]   BP Location: Left arm    Physical Exam   Constitutional: He appears well-developed and well-nourished.   HENT:   Head: Normocephalic and atraumatic.   Cardiovascular: Normal rate.    Pulmonary/Chest: Effort normal.   Musculoskeletal: Normal range of motion.   Neurological: He is alert.   Nursing note and vitals reviewed.      Neurological Exam:   Alert and oriented  No aphasia  No dysarthria   Intact sensation   No facial drooping  EOMs intact     NIH Stroke Scale:    Level of Consciousness: 0 - alert  LOC Questions: 0 - answers both correctly  LOC Commands: 0 - performs both correctly  Best Gaze: 0 - normal  Visual: 0 - no visual loss  Facial Palsy: 0 - normal  Motor Left Arm: 0 - no drift  Motor Right Arm: 0 - no drift  Motor Left Le - no drift  Motor Right Le - no drift  Limb Ataxia: 0 - absent  Sensory: 0 - normal  Best Language: 0 - no aphasia  Dysarthria: 0 - normal articulation  Extinction and Inattention: 0 - no neglect  NIH Stroke Scale Total: 0      Laboratory:  CMP:   Recent Labs  Lab 17  0425   CALCIUM 8.3*   ALBUMIN 2.2*   PROT 4.5*   *   K 4.1   CO2 20*      BUN 27*   CREATININE 3.9*   ALKPHOS 216*   ALT 8*   AST 16   BILITOT 2.4*     CBC:   Recent Labs  Lab 17  1438   WBC 10.54   RBC 3.01*   HGB 9.2*   HCT 26.9*   *   MCV 89   MCH 30.6   MCHC 34.2     Lipid Panel: No results for input(s): CHOL, LDLCALC, HDL, TRIG in the last 168 hours.  Coagulation: No results for input(s): INR, APTT in the last 168 hours.    Invalid input(s): PT  Platelet Aggregation Study: No results for input(s): PLTAGG, PLTAGINTERP, PLTAGREGLACO, ADPPLTAGGREG in the last 168 hours.  Hgb A1C: No results for input(s): HGBA1C in the last 168 hours.  TSH: No results for input(s): TSH in the last 168 hours.    Diagnostic Results:  I have personally reviewed:  MRI 17  Focus of diffusion restriction in the  central missy. The location is somewhat atypical of infarcts from the pontine perforators.  The clinical presentation of seizures is also atypical for acute infarction.  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  Clinical correlation and short-term followup is recommended.

## 2017-11-03 NOTE — ASSESSMENT & PLAN NOTE
- likely ischemic ATN from decreased renal perfusion (volume shifts from paracentesis, anemia). Urine sediment with muddy brown casts  - underwent OLTx on 10/19; received intraop CRRT  - remains dialysis dependent at this time  - Patient  Will have dialysis today for removal of toxins and excess fluid, duration of 3 hours and UF 2-3 L as tolerated by patient, maintain a MAP>65, will adjust bath to electrolytes.  - Still anuric   - Continue with intake and output monitoring  - Yesterday presented with 2 episodes of seizure, follow up by neurologist

## 2017-11-03 NOTE — PROGRESS NOTES
[facility]  Liver Transplant  Progress Note    Patient Name: Jhonny Diana  MRN: [id number]  Admission Date: 2017  Hospital Length of Stay: 37 days  Code Status: Full Code  Primary Care Provider: Provider Notinsystem  Post-Operative Day: 15    ORGAN:   LIVER  Disease Etiology: Acute Alcoholic Hepatitis  Donor Type:    - Brain Death  CDC High Risk:   No  Donor CMV Status:   Donor CMV Status: Positive  Donor HBcAB:   Negative  Donor HCV Status:   Negative  Whole or Partial: Whole Liver  Biliary Anastomosis: End to End  Arterial Anatomy: Standard  Subjective:     History of Present Illness:  Jhonny Diana is a 28 yo male with hx of childhood asthma and heavy alcohol abuse (fifth of liquor daily since teenager, last drink 2.5 wks ago per mother) who was [place] [facility] [place] higher [place]/liver transplant evaluation. Patient initially presented to OSH for a 2 mo hx of progressively worsening abdominal pain with associated fatigue, jaundice, and abdominal distention with ~20# weight gain.  Reports family hx of alcoholic cirrhosis in father. Pt was recently diagnosed with alcoholic hepatitis during his admission at OSH on -9/15. During that hospitalization, patient was given ceftriaxone for SBP ppx, prednisone for alcoholic hepatitis, and diuresed. Pt was discharged and instructed to follow up with PCP and AA within 1 wk. After his discharge, patient returned to ED 4 days after for increased weight gain and worsening abdominal pain/jaundice.     Mr. Diana was admitted to the [facility] on  for decompensated alcoholic hepatitis with high MELD score, severe HE, and LAURA. Hepatology consulted and followed to evaluate liver transplant candidacy. Pt started on albumin and lasix for LAURA and possibility for HRS. However, nephrology felt that LAURA is likely multifactorial: ATN given hypotensive episodes at OSH and toxic given severe hyperbilirubinemia; not truly HRS. Lactulose/rifaximin started  for HE and mental status slowly improved. Mr. Diana with significant ABD pain since admission with ABD distension. CT abdomen was significant for pericolonic fat stranding of right colon; no obstruction. Also with diffuse mesenteric edema. No significant ascites noted, no cholecystitis/cholelithiasis/ductal dilation. US liver with doppler showed appropriate vasculature.  He was stepped down to Hospital Medicine on 10/5.  Paracentesis was done on 10/9 with 3.3L removed and 10/12 with 1.8 removed, no infection. His diet was advanced and Dietary was consulted to help get adequate caloric intake. Listed for liver transplant 10/17 with MELD 36.    Pt received organ offer and underwent liver txp 10/19 which took place without complication.  Steroid induction used, CMV +/+.  Post operatively, LFTs/enzymes trended down nicely.  POD 5 U/S with elevated RIs and sluggish flow in HA system, otherwise ok.  Pt with LAURA prior to and s/p txp requiring dialysis.  He is tolerating HD with last HD 10/30.      Hospital Course:  Interval hx:  No seizure overnight.  Last seizure 11/2/17 at 330pm.  Ativan NOT needed.  After second seizure, loaded keppra 1gm IV and started Keppra 500mg PO bid.  Tolerated HD today per permcath.  Blood cx sent 11/2 after first seizure- prelim NGTD.  Prograf remains off.  Level was 5 today.  Will need to discuss rather to start something else over weekend.  MRI 11/2/17, results reviewed.  EEG results pending.  Neuro following.  Stroke team has signed off.  Chevron opened additional 8 cm, staples removed.  Placing vera flow wd vac(originally opened 10/27, vac placed 10/29).    Pt's mom reported intermittent delirium/emotional lability.  Psych was consulted no plan for medications at this time.       2D echo r/o emboli negative embolus showed small pericardial effusion, PA pressure 33.  Pt is on HD, s/w cardiology on call.  Cont to michelle, cont tele and monitor.    Scheduled Meds:   sodium chloride    Intravenous Once    atovaquone  1,500 mg Oral Daily    bisacodyl  10 mg Oral Daily    bisacodyl  10 mg Rectal Once    docusate sodium  100 mg Oral TID PC    epoetin maurisio  5,000 Units Intravenous Every Mon, Wed, Fri    ergocalciferol  50,000 Units Oral Q7 Days    famotidine  20 mg Oral QHS    fluconazole  200 mg Oral Daily    heparin (porcine)  5,000 Units Subcutaneous Q8H    HYDROmorphone  0.2 mg Intravenous Once    levETIRAcetam  500 mg Oral BID    multivitamin  1 tablet Oral Daily    mycophenolate  1,000 mg Oral BID    polyethylene glycol  17 g Oral Daily    predniSONE  20 mg Oral Daily    sodium bicarbonate  1,300 mg Oral BID    thiamine  100 mg Oral Daily    valganciclovir 50 mg/ml  100 mg Oral Every Mon, Wed, Fri     Continuous Infusions:     PRN Meds:sodium chloride, sodium chloride 0.9%, bisacodyl, dextrose 50%, dextrose 50%, glucagon (human recombinant), glucose, glucose, heparin (porcine), insulin aspart, ondansetron, oxyCODONE-acetaminophen, oxyCODONE-acetaminophen, simethicone    Review of Systems   Constitutional: Positive for activity change (decreased), appetite change (improving) and fatigue. Negative for chills, diaphoresis and fever.   HENT: Negative for congestion, mouth sores and trouble swallowing.    Eyes: Negative for visual disturbance.   Respiratory: Negative for cough, choking, shortness of breath and wheezing.    Cardiovascular: Negative for chest pain, palpitations and leg swelling.   Gastrointestinal: Positive for abdominal distention and abdominal pain. Negative for constipation, diarrhea, nausea and vomiting.   Endocrine: Negative.    Genitourinary: Negative for decreased urine volume, difficulty urinating, dysuria, hematuria and urgency.   Musculoskeletal: Negative for arthralgias, back pain and joint swelling.   Skin: Positive for wound. Negative for color change, pallor and rash.   Allergic/Immunologic: Positive for immunocompromised state.   Neurological: Positive  for weakness. Negative for dizziness, tremors, seizures, syncope and headaches.   Hematological: Bruises/bleeds easily.   Psychiatric/Behavioral: Positive for sleep disturbance. Negative for agitation, confusion, decreased concentration, dysphoric mood, hallucinations and suicidal ideas. The patient is not nervous/anxious.    All other systems reviewed and are negative.    Objective:     Vital Signs (Most Recent):  Temp: 98.9 °F (37.2 °C) (11/03/17 1625)  Pulse: 104 (11/03/17 1625)  Resp: 16 (11/03/17 1625)  BP: 108/60 (11/03/17 1625)  SpO2: 99 % (11/03/17 1625) Vital Signs (24h Range):  Temp:  [98.2 °F (36.8 °C)-99 °F (37.2 °C)] 98.9 °F (37.2 °C)  Pulse:  [] 104  Resp:  [16-20] 16  SpO2:  [98 %-100 %] 99 %  BP: (108-149)/(56-85) 108/60     Weight: 74.2 kg (163 lb 9.3 oz)  Body mass index is 25.62 kg/m².    Intake/Output - Last 3 Shifts       11/01 0700 - 11/02 0659 11/02 0700 - 11/03 0659 11/03 0700 - 11/04 0659    P.O. 720 810     I.V. (mL/kg)  1130.8 (15.2)     Blood   350    Other 600  950    IV Piggyback  50     Total Intake(mL/kg) 1320 (17.4) 1990.8 (26.8) 1300 (17.5)    Urine (mL/kg/hr) 75 (0) 25 (0) 150 (0.2)    Emesis/NG output 0 (0)      Other 2650 (1.5)  2950 (4.1)    Stool 0 (0) 0 (0)     Blood 0 (0)      Total Output 2725 25 3100    Net -1405 +1965.8 -1800           Urine Occurrence 0 x      Stool Occurrence 1 x 0 x     Emesis Occurrence 0 x            Physical Exam   Constitutional: He is oriented to person, place, and time. He appears well-developed. No distress.   Temporal and distal extremity muscle wasting   HENT:   Head: Normocephalic and atraumatic.   Mouth/Throat: No oropharyngeal exudate.   Eyes: EOM are normal. Pupils are equal, round, and reactive to light. Scleral icterus is present.   Neck: Normal range of motion. Neck supple.   Cardiovascular: Normal rate, regular rhythm and normal heart sounds.  Exam reveals no gallop and no friction rub.    No murmur heard.  Pulmonary/Chest: Effort  normal. No respiratory distress. He has no rales.   Dec R base   Abdominal: Soft. Bowel sounds are normal. He exhibits distension. There is no tenderness.   Chevron inc with wound vac to R lateral side x 7 cm  Serosang output   Musculoskeletal: Normal range of motion. He exhibits edema (abdominal edema >right lower quadrant).   Neurological: He is alert and oriented to person, place, and time.   Skin: Skin is warm and dry. Capillary refill takes 2 to 3 seconds. He is not diaphoretic.   jaundiced   Psychiatric: He has a normal mood and affect. His behavior is normal. Judgment and thought content normal.   Nursing note and vitals reviewed.    Laboratory:  Immunosuppressants         Stop Route Frequency     mycophenolate capsule 1,000 mg      -- Oral 2 times daily        CBC:     Recent Labs  Lab 11/03/17  1438   WBC 10.54   RBC 3.01*   HGB 9.2*   HCT 26.9*   *   MCV 89   MCH 30.6   MCHC 34.2     CMP:     Recent Labs  Lab 11/03/17  0425   GLU 74   CALCIUM 8.3*   ALBUMIN 2.2*   PROT 4.5*   *   K 4.1   CO2 20*      BUN 27*   CREATININE 3.9*   ALKPHOS 216*   ALT 8*   AST 16   BILITOT 2.4*     Tacrolimus Lvl   Date Value Ref Range Status   11/03/2017 5.0 5.0 - 15.0 ng/mL Final     Comment:     Testing performed by Liquid Chromatography-Tandem  Mass Spectrometry (LC-MS/MS).  This test was developed and its performance characteristics  determined by [facility], Department of Pathology  and Laboratory Medicine in a manner consistent with CLIA  requirements. It has not been cleared or approved by the [place]  Food and Drug Administration.  This test is used for clinical   purposes.  It should not be regarded as investigational or for  research.     11/02/2017 6.1 5.0 - 15.0 ng/mL Final     Comment:     Testing performed by Liquid Chromatography-Tandem  Mass Spectrometry (LC-MS/MS).  This test was developed and its performance characteristics  determined by [facility], Department of  Pathology  and Laboratory Medicine in a manner consistent with CLIA  requirements. It has not been cleared or approved by the [place]  Food and Drug Administration.  This test is used for clinical   purposes.  It should not be regarded as investigational or for  research.     10/31/2017 7.9 5.0 - 15.0 ng/mL Final     Comment:     Testing performed by Liquid Chromatography-Tandem  Mass Spectrometry (LC-MS/MS).  This test was developed and its performance characteristics  determined by [facility], Department of Pathology  and Laboratory Medicine in a manner consistent with CLIA  requirements. It has not been cleared or approved by the [place]  Food and Drug Administration.  This test is used for clinical   purposes.  It should not be regarded as investigational or for  research.     10/30/2017 11.5 5.0 - 15.0 ng/mL Final     Comment:     Testing performed by Liquid Chromatography-Tandem  Mass Spectrometry (LC-MS/MS).  This test was developed and its performance characteristics  determined by [facility], Department of Pathology  and Laboratory Medicine in a manner consistent with CLIA  requirements. It has not been cleared or approved by the [place]  Food and Drug Administration.  This test is used for clinical   purposes.  It should not be regarded as investigational or for  research.         Labs within the past 24 hours have been reviewed.    Diagnostic Results:  I have personally reviewed all pertinent imaging studies.    Assessment/Plan:     * Liver transplant 10/19/2017 for acute alcoholic hepatitis    - LFTs/enzymes trended down nicely.  Alk phos elevated POD #14- monitor closely.   - Cont bowel regimen.  - Transitioned pain control to scheduled acetaminophen + Tramadol as hallucinations with oxycodone- tolerating well.  - Liver US POD 5 with elevated RIs and slowed flow HA system.  US 10/23 technically difficult exam w difficulty visualizing arterial system and slow flow w no tardus parvus.  Repeat  liver US POD# 13 showed continued sluggish flow in HA system.    - ASA started 11/2/17.  - Wound vac placed 10/29. Wd opened additional 8 cm 11/3-  veraflow wd vac placed afterwards.          Seizure    - Seizure 11/2/17 at 10am and 330pm.  Ativan NOT needed.  Seizures last 10-15 sec, started w stuttering same syllable, followed by eyes rolling back and full body convulsing.  Pt had postictal symptoms, drowsiness, tearful and sleepy for about 45 minutes. Prograf discontinued 11/2.  Neuro consulted- recommended Keppra load w 1gm IV f/b 500 mg PO bid.  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  The clinical presentation of seizures is also atypical for acute infarction.  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    - EEG 11/3/17- RESULTS PENDING.   - KEPPRA LEVEL IN AM before am dose of med.    - Apprec Neuro recs.  Need to get recs for when to repeat imaging.  - started MVI and Thiamine        LAURA (acute kidney injury)    - Nephrology following.  Apprec recs.  - Tolerated HD 11/03/17- tolerated well.  - Permcath placed 10/30/17  - Set up for outpt HD  - of note, small pleural effusion, PA pressure 33.  Did NOT consult cardiology officially but s/w them per phone- they recommend cont to diurese, tele and monitor.  Consult as necessary.          Alcoholism /alcohol abuse    - Psych consulted, high risk for transplant. WILL NEED to complete ABU program s/p transplant          Severe malnutrition    - Dietary consulted and following.   - Continue protein powder        Prophylactic immunotherapy    - Continue prednisone taper, Cellcept.  - Will monitor for signs of toxic side effects, check daily tacrolimus troughs, and change meds accordingly.  - Discontinued Prograf 11/2/17.          Long-term use of immunosuppressant medication    - See prophylactic immunotherapy          At risk for opportunistic infections    - D/C Dapsone secondary  to worsening anemia and continue atovaquone for PCP prophylaxis  - Continue Valcyte for CMV prophylaxis  - Continue fluconazole for fungal prophylaxis          Physical deconditioning    - Working with PT/OT  - Rehab consult placed- on hold for seizure activity.          Anasarca    - 2/2 liver disease, LAURA, and hypoalbuminemia  - Expect improvement once kidneys improve.    - Attempt to keep dressings dry  - cont to diurese per HD.         Acute blood loss anemia    - PRBC x 2 u 10/26 and responded nicely  - h/h has trended down.  HGb this am 7.1.  Old clot removed from chevron, bleeding subsided w pressure.  Transfused 1 u PRBC w appropriate response- repeat hgb 9.2.    - plan to start Procrit MWF w HD.          Abnormal finding on MRI of brain    - MRI findings 11/2 following second seizure-  Focus of diffusion restriction in the central missy. The location is somewhat atypical of infarcts from the pontine perforators.  The clinical presentation of seizures is also atypical for acute infarction.  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  Clinical correlation and short-term followup is recommended.  - stroke team consulted.  Not likely stroke.  Has signed off.  Apprec recs.  - Neurology consulted and following.  Apprec recs.  Need recs regarding when to repeat imaging.                VTE Risk Mitigation         Ordered     heparin (porcine) injection 5,000 Units  Every 8 hours     Route:  Subcutaneous        10/30/17 0703     heparin (porcine) injection 1,000 Units  As needed (PRN)     Route:  Intra-Catheter        10/30/17 1649     Place sequential compression device  Until discontinued      10/20/17 0346     High Risk of VTE  Once      10/20/17 0346          The patients clinical status was discussed at multidisplinary rounds, involving transplant surgery, transplant medicine, pharmacy, nursing, nutrition, and social work    Discharge Planning:  No plan for discharge over wkd.     Norma LEVI  SAMREEN Blackwood  Liver Transplant  [facility]

## 2017-11-03 NOTE — ASSESSMENT & PLAN NOTE
Nutrition Diagnosis:  Increased nutrient needs    Related to (etiology):   protein for healing and muscle maintenance    Signs and Symptoms (as evidenced by):   S/p OLTx 10/19 and poor intake prior to sgy    Interventions/Recommendations (treatment strategy):  See RD recs above    Nutrition Diagnosis Status:   Continues

## 2017-11-03 NOTE — PROGRESS NOTES
Seen today for vac dressing change with Norma Blackwood NP  Tracking along chevron extends up to 10 cm. Opened up more of incision line . Suture visible at lateral edge of wound. Base still slough filled and had some bleeding until wound opened .      11/03/17 1700       Incision/Site 10/30/17 0911 Right abdomen lower other (see comments)   Date First Assessed/Time First Assessed: 10/30/17 0911   Present Prior to Hospital Arrival?: No  Side: Right  Location: abdomen  Orientation: lower  Incision Type: other (see comments)  Closure Method: sutures  Additional Comments: ostomy bag to old j...   Wound Image     Incision WDL ex   Appearance slough;yellow;moist;pink;bleeding   Periwound Area ecchymotic   Drainage Characteristics/Odor serosanguineous   Drainage Amount small   Wound Length (cm) 4.4   Wound Width (cm) 17   Depth (cm) 4.2   Tunneling (depth (cm)/location) 2cm @ 3 o'clock   Wound Edges open   Cleansed W/ sterile normal saline   Irrigated W/ sterile normal saline   Interventions liquid bandage applied   Therapy Setting intermittent therapy;other (see comments)  (Veraflow irrigate w/ 70cc every 2 hours)   Pressure Setting 125 mmHg   Negative Pressure Wound Therapy Dressing (cleansing grey)   Sponges Inserted 2   Sponges Removed 2   Black Sponges Removed 2   Dressing Change Due 11/07/17     Placed in veraflow irrigating wound vac with cleansing sponge to assist with debridement.   Plan to change on Tuesday.  Zoë Murphy RN CWON  [phone]

## 2017-11-03 NOTE — PLAN OF CARE
Problem: Patient Care Overview  Goal: Plan of Care Review  Outcome: Ongoing (interventions implemented as appropriate)  Pt AAOx4, VSS, afebrile.  Wound vac changed per wound care nurse.  EEG results pending.  Pt had HD today with 2L removed. 1 unit PRBC administered in HD.  Repeat H/H 9.2/26.9.  No seizures since started on Keppra.  Pt up with walker with 1 person assist.  C/o incisional pain with relief to PRN pain medication.  Self meds pulled per mother.  Self med box updated and refilled.      Mother remain at pt bedside and active in pt care.  Fall risk precautions initiated.  Pt in lowest bed position setting, lighting adjusted, pt to wear nonskid socks when ambulating, side rails up x2.  Pt remain free from falls during shift.   Call light within reach. Will continue to monitor.

## 2017-11-03 NOTE — ASSESSMENT & PLAN NOTE
- MRI findings 11/2 following second seizure-  Focus of diffusion restriction in the central missy. The location is somewhat atypical of infarcts from the pontine perforators.  The clinical presentation of seizures is also atypical for acute infarction.  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  Clinical correlation and short-term followup is recommended.  - stroke team consulted.  Not likely stroke.  Has signed off.  Apprec recs.  - Neurology consulted and following.  Apprec recs.  Need recs regarding when to repeat imaging.

## 2017-11-03 NOTE — ASSESSMENT & PLAN NOTE
- Seizure 11/2/17 at 10am and 330pm.  Ativan NOT needed.  Seizures last 10-15 sec, started w stuttering same syllable, followed by eyes rolling back and full body convulsing.  Pt had postictal symptoms, drowsiness, tearful and sleepy for about 45 minutes. Prograf discontinued 11/2.  Neuro consulted- recommended Keppra load w 1gm IV f/b 500 mg PO bid.  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  The clinical presentation of seizures is also atypical for acute infarction.  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    - EEG 11/3/17- RESULTS PENDING.   - KEPPRA LEVEL IN AM before am dose of med.    - Apprec Neuro recs.  Need to get recs for when to repeat imaging.

## 2017-11-03 NOTE — SUBJECTIVE & OBJECTIVE
Scheduled Meds:   sodium chloride   Intravenous Once    atovaquone  1,500 mg Oral Daily    bisacodyl  10 mg Oral Daily    bisacodyl  10 mg Rectal Once    docusate sodium  100 mg Oral TID PC    epoetin maurisio  5,000 Units Intravenous Every Mon, Wed, Fri    ergocalciferol  50,000 Units Oral Q7 Days    famotidine  20 mg Oral QHS    fluconazole  200 mg Oral Daily    heparin (porcine)  5,000 Units Subcutaneous Q8H    HYDROmorphone  0.2 mg Intravenous Once    levETIRAcetam  500 mg Oral BID    multivitamin  1 tablet Oral Daily    mycophenolate  1,000 mg Oral BID    polyethylene glycol  17 g Oral Daily    predniSONE  20 mg Oral Daily    sodium bicarbonate  1,300 mg Oral BID    thiamine  100 mg Oral Daily    valganciclovir 50 mg/ml  100 mg Oral Every Mon, Wed, Fri     Continuous Infusions:     PRN Meds:sodium chloride, sodium chloride 0.9%, bisacodyl, dextrose 50%, dextrose 50%, glucagon (human recombinant), glucose, glucose, heparin (porcine), insulin aspart, ondansetron, oxyCODONE-acetaminophen, oxyCODONE-acetaminophen, simethicone    Review of Systems   Constitutional: Positive for activity change (decreased), appetite change (improving) and fatigue. Negative for chills, diaphoresis and fever.   HENT: Negative for congestion, mouth sores and trouble swallowing.    Eyes: Negative for visual disturbance.   Respiratory: Negative for cough, choking, shortness of breath and wheezing.    Cardiovascular: Negative for chest pain, palpitations and leg swelling.   Gastrointestinal: Positive for abdominal distention and abdominal pain. Negative for constipation, diarrhea, nausea and vomiting.   Endocrine: Negative.    Genitourinary: Negative for decreased urine volume, difficulty urinating, dysuria, hematuria and urgency.   Musculoskeletal: Negative for arthralgias, back pain and joint swelling.   Skin: Positive for wound. Negative for color change, pallor and rash.   Allergic/Immunologic: Positive for  immunocompromised state.   Neurological: Positive for weakness. Negative for dizziness, tremors, seizures, syncope and headaches.   Hematological: Bruises/bleeds easily.   Psychiatric/Behavioral: Positive for sleep disturbance. Negative for agitation, confusion, decreased concentration, dysphoric mood, hallucinations and suicidal ideas. The patient is not nervous/anxious.    All other systems reviewed and are negative.    Objective:     Vital Signs (Most Recent):  Temp: 98.9 °F (37.2 °C) (11/03/17 1625)  Pulse: 104 (11/03/17 1625)  Resp: 16 (11/03/17 1625)  BP: 108/60 (11/03/17 1625)  SpO2: 99 % (11/03/17 1625) Vital Signs (24h Range):  Temp:  [98.2 °F (36.8 °C)-99 °F (37.2 °C)] 98.9 °F (37.2 °C)  Pulse:  [] 104  Resp:  [16-20] 16  SpO2:  [98 %-100 %] 99 %  BP: (108-149)/(56-85) 108/60     Weight: 74.2 kg (163 lb 9.3 oz)  Body mass index is 25.62 kg/m².    Intake/Output - Last 3 Shifts       11/01 0700 - 11/02 0659 11/02 0700 - 11/03 0659 11/03 0700 - 11/04 0659    P.O. 720 810     I.V. (mL/kg)  1130.8 (15.2)     Blood   350    Other 600  950    IV Piggyback  50     Total Intake(mL/kg) 1320 (17.4) 1990.8 (26.8) 1300 (17.5)    Urine (mL/kg/hr) 75 (0) 25 (0) 150 (0.2)    Emesis/NG output 0 (0)      Other 2650 (1.5)  2950 (4.1)    Stool 0 (0) 0 (0)     Blood 0 (0)      Total Output 2725 25 3100    Net -1405 +1965.8 -1800           Urine Occurrence 0 x      Stool Occurrence 1 x 0 x     Emesis Occurrence 0 x            Physical Exam   Constitutional: He is oriented to person, place, and time. He appears well-developed. No distress.   Temporal and distal extremity muscle wasting   HENT:   Head: Normocephalic and atraumatic.   Mouth/Throat: No oropharyngeal exudate.   Eyes: EOM are normal. Pupils are equal, round, and reactive to light. Scleral icterus is present.   Neck: Normal range of motion. Neck supple.   Cardiovascular: Normal rate, regular rhythm and normal heart sounds.  Exam reveals no gallop and no friction  rub.    No murmur heard.  Pulmonary/Chest: Effort normal. No respiratory distress. He has no rales.   Dec R base   Abdominal: Soft. Bowel sounds are normal. He exhibits distension. There is no tenderness.   Chevron inc with wound vac to R lateral side x 7 cm  Serosang output   Musculoskeletal: Normal range of motion. He exhibits edema (abdominal edema >right lower quadrant).   Neurological: He is alert and oriented to person, place, and time.   Skin: Skin is warm and dry. Capillary refill takes 2 to 3 seconds. He is not diaphoretic.   jaundiced   Psychiatric: He has a normal mood and affect. His behavior is normal. Judgment and thought content normal.   Nursing note and vitals reviewed.    Laboratory:  Immunosuppressants         Stop Route Frequency     mycophenolate capsule 1,000 mg      -- Oral 2 times daily        CBC:     Recent Labs  Lab 11/03/17  1438   WBC 10.54   RBC 3.01*   HGB 9.2*   HCT 26.9*   *   MCV 89   MCH 30.6   MCHC 34.2     CMP:     Recent Labs  Lab 11/03/17  0425   GLU 74   CALCIUM 8.3*   ALBUMIN 2.2*   PROT 4.5*   *   K 4.1   CO2 20*      BUN 27*   CREATININE 3.9*   ALKPHOS 216*   ALT 8*   AST 16   BILITOT 2.4*     Tacrolimus Lvl   Date Value Ref Range Status   11/03/2017 5.0 5.0 - 15.0 ng/mL Final     Comment:     Testing performed by Liquid Chromatography-Tandem  Mass Spectrometry (LC-MS/MS).  This test was developed and its performance characteristics  determined by [facility], Department of Pathology  and Laboratory Medicine in a manner consistent with CLIA  requirements. It has not been cleared or approved by the [place]  Food and Drug Administration.  This test is used for clinical   purposes.  It should not be regarded as investigational or for  research.     11/02/2017 6.1 5.0 - 15.0 ng/mL Final     Comment:     Testing performed by Liquid Chromatography-Tandem  Mass Spectrometry (LC-MS/MS).  This test was developed and its performance  characteristics  determined by [facility], Department of Pathology  and Laboratory Medicine in a manner consistent with CLIA  requirements. It has not been cleared or approved by the [place]  Food and Drug Administration.  This test is used for clinical   purposes.  It should not be regarded as investigational or for  research.     10/31/2017 7.9 5.0 - 15.0 ng/mL Final     Comment:     Testing performed by Liquid Chromatography-Tandem  Mass Spectrometry (LC-MS/MS).  This test was developed and its performance characteristics  determined by [facility], Department of Pathology  and Laboratory Medicine in a manner consistent with CLIA  requirements. It has not been cleared or approved by the [place]  Food and Drug Administration.  This test is used for clinical   purposes.  It should not be regarded as investigational or for  research.     10/30/2017 11.5 5.0 - 15.0 ng/mL Final     Comment:     Testing performed by Liquid Chromatography-Tandem  Mass Spectrometry (LC-MS/MS).  This test was developed and its performance characteristics  determined by [facility], Department of Pathology  and Laboratory Medicine in a manner consistent with CLIA  requirements. It has not been cleared or approved by the [place]  Food and Drug Administration.  This test is used for clinical   purposes.  It should not be regarded as investigational or for  research.         Labs within the past 24 hours have been reviewed.    Diagnostic Results:  I have personally reviewed all pertinent imaging studies.

## 2017-11-03 NOTE — ASSESSMENT & PLAN NOTE
- 2/2 liver disease, LAURA, and hypoalbuminemia  - Expect improvement once kidneys improve.    - Attempt to keep dressings dry  - cont to diurese per HD.

## 2017-11-03 NOTE — ASSESSMENT & PLAN NOTE
- Continue prednisone taper, Cellcept.  - Will monitor for signs of toxic side effects, check daily tacrolimus troughs, and change meds accordingly.  - Discontinued Prograf 11/2/17.

## 2017-11-03 NOTE — ASSESSMENT & PLAN NOTE
27 year old man with alcohol abuse, s/p OLT, and LAURA requiring HD who had an episode consistent with seizure on 11/2. He is at risk for seizure due to his metabolic derangements and immunosuppressant medication, as well as self reported sleep deprivation. At this time we do not recommend starting an AED as the risks outweigh the benefits in a first time seizure. However if he has any further events he will require treatment.    MRI brain showed area of diffusion restriction in the missy consistent with central pontine myelinolysis. This is likely multifactorial in the setting of liver transplant, immunosuppression and metabolic derangements however it's unclear if this is related to his seizure.     -- EEG pending  -- Continue levetiracetam 500mg BID  -- Get levetiracetam level tomorrow morning before AM dose  -- Seizure precautions

## 2017-11-03 NOTE — ASSESSMENT & PLAN NOTE
- LFTs/enzymes trended down nicely.  Alk phos elevated POD #14- monitor closely.   - Cont bowel regimen.  - Transitioned pain control to scheduled acetaminophen + Tramadol as hallucinations with oxycodone- tolerating well.  - Liver US POD 5 with elevated RIs and slowed flow HA system.  US 10/23 technically difficult exam w difficulty visualizing arterial system and slow flow w no tardus parvus.  Repeat liver US POD# 13 showed continued sluggish flow in HA system.    - ASA started 11/2/17.  - Wound vac placed 10/29. Wd opened additional 8 cm 11/3-  veraflow wd vac placed afterwards.

## 2017-11-03 NOTE — PROGRESS NOTES
Patient received from floor with report from SISSY Hoang.  Acute dialysis began per orders via right IJ tunneled catheter.

## 2017-11-03 NOTE — CONSULTS
[facility]  Vascular Neurology  Comprehensive Stroke Center  Consult Note    Inpatient consult to Vascular (Stroke) Neurology  Consult performed by: HOA MARKHAM  Consult ordered by: ADIA MCLEOD        Assessment/Plan:     Patient is a 27 y.o. year old male with:    Abnormal finding on MRI of brain    26 yo man with PMHx ESLD 2/2 alcoholic liver disease admitted for liver failure s/p orthotopic liver transplant (10/19, POD 14) w/ ARF on HD (last received 11/1) with new onset seizures, referred to Vascular Neurology for MRI showing focus of diffusion restriction in central missy, possibly acute stroke vs central pontine myelinolysis (CPM).    Pt with no focal neurologic deficits consistent with stroke or vascular distribution. Lesion location on imaging as well as clinical presentation of seizures more consistent with central pontine myelinolysis. Will reassess clinically tomorrow for further recommendations.         Seizure    Gen Neuro following  Keppra 500  EEG pending  Management per primary team            Thrombolysis Candidate? No  1. Contraindications: Recent surgery/trauma (<15 days)    Interventional Revascularization Candidate?  No; No significant neurological deficit      Subjective:     History of Present Illness:  Mr. 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 Today, pt had 2 episodes of new seizure. Per chart review, seizures each lasted 10-15 secs, started w stuttering same syllable followed by eyes rolling back and full body convulsing. Pt had postictal symptoms, drowsiness, tearful and sleepy for about 45 minutes following. Gen Neuro was consulted, recommended MRI and EEG. MRI result showed a focus of diffusion restriction in the central missy, possibly acute stroke vs central pontine myelinolysis (CPM), and Vascular Neurology was  consulted.  Pt reports no recent issues with extremity weakness, numbness/tingling, slurred speech, vision changes, or facial droop. He states for several weeks, has had intermittent issues with swallowing food, stating food feels temporarily stuck in his throat. Pt and mother at bedside deny any acute changes in mental status or motor function apart from during seizures today. Pt not on blood thinners, is on immunosuppressants s/p liver transplant.         Past Medical History:   Diagnosis Date    Alcoholic hepatitis with ascites     Alcoholic hepatitis with ascites     History of hematemesis 9/28/2017    Hypertension      Past Surgical History:   Procedure Laterality Date    APPENDECTOMY       History reviewed. No pertinent family history.  Social History   Substance Use Topics    Smoking status: Former Smoker     Types: Cigarettes    Smokeless tobacco: Never Used    Alcohol use Yes      Comment: a fifth of liquor daily for years, cut back over last 2 months     Review of patient's allergies indicates:   Allergen Reactions    Bactrim [sulfamethoxazole-trimethoprim] Other (See Comments)     Mookie Trell Syndrome     Medications: I have reviewed the current medication administration record.    No prescriptions prior to admission.       Review of Systems   Constitutional: Negative for appetite change and fever.   HENT: Negative for nosebleeds and sneezing.    Eyes: Negative for discharge and visual disturbance.   Respiratory: Negative for cough and stridor.    Cardiovascular: Positive for leg swelling. Negative for chest pain.   Gastrointestinal: Negative for abdominal pain and vomiting.   Genitourinary: Negative for dysuria and frequency.   Musculoskeletal: Negative for joint swelling and neck stiffness.   Skin: Negative for pallor and rash.   Neurological: Positive for seizures. Negative for facial asymmetry, weakness and numbness.   Psychiatric/Behavioral: Negative for agitation, behavioral problems and  confusion.     Objective:     Vital Signs (Most Recent):  Temp: 98.6 °F (37 °C) (11/02/17 1736)  Pulse: 104 (11/02/17 1736)  Resp: 16 (11/02/17 1736)  BP: 119/74 (11/02/17 1736)  SpO2: 99 % (11/02/17 1736)    Vital Signs Range (Last 24H):  Temp:  [98.3 °F (36.8 °C)-99 °F (37.2 °C)]   Pulse:  []   Resp:  [16-18]   BP: (117-139)/(59-78)   SpO2:  [93 %-100 %]     Physical Exam   Constitutional: He is oriented to person, place, and time. He appears well-developed and well-nourished. No distress.   HENT:   Head: Normocephalic and atraumatic.   Eyes: EOM are normal. Pupils are equal, round, and reactive to light. Scleral icterus is present.   Neck: No tracheal deviation present.   Cardiovascular: Normal rate and regular rhythm.    Pulmonary/Chest: Effort normal. No stridor. No respiratory distress.   Musculoskeletal: Normal range of motion. He exhibits edema. He exhibits no tenderness.   Neurological: He is alert and oriented to person, place, and time. No cranial nerve deficit or sensory deficit. Coordination normal.   Skin: Skin is warm and dry.   Psychiatric: He has a normal mood and affect. His behavior is normal. Judgment and thought content normal.       Neurological Exam:   LOC: alert and follows requests  Language: No aphasia  Speech: No dysarthria  Orientation: Person, Place, Time  Memory: Recent memory intact, Remote memory intact, Age correct, Month correct  Visual Fields (CN II): Full  EOM (CN III, IV, VI): Full/intact  Pupils (CN III, IV, VI): PERRL  Facial Sensation (CN V): Symmetric  Facial Movement (CN VII): symmetric facial expression  Tongue (CN XII): to midline  Motor*: 5/5 in all extremities  Cerebellar*: Finger/Nose Normal  Sensation: intact to light touch    NIH Stroke Scale:    Level of Consciousness: 0 - alert  LOC Questions: 0 - answers both correctly  LOC Commands: 0 - performs both correctly  Best Gaze: 0 - normal  Visual: 0 - no visual loss  Facial Palsy: 0 - normal  Motor Left Arm: 0 -  no drift  Motor Right Arm: 0 - no drift  Motor Left Le - no drift  Motor Right Le - no drift  Limb Ataxia: 0 - absent  Sensory: 0 - normal  Best Language: 0 - no aphasia  Dysarthria: 0 - normal articulation  Extinction and Inattention: 0 - no neglect  NIH Stroke Scale Total: 0  Modified Dimitry Scale:   Timeline: Prior to symptoms onset  Modified [place] Score: 2 - slight disability        Laboratory:  CMP:   Recent Labs  Lab 17  0440   CALCIUM 8.2*   ALBUMIN 2.3*   PROT 4.6*      K 3.6   CO2 22*      BUN 24*   CREATININE 3.3*   ALKPHOS 252*   ALT 7*   AST 18   BILITOT 2.6*     CBC:   Recent Labs  Lab 17  0440 17  0711   WBC 7.93  --    RBC 2.47*  --    HGB 7.5* 8.5*   HCT 23.0* 24.1*   *  --    MCV 93  --    MCH 30.4  --    MCHC 32.6  --      Lipid Panel: No results for input(s): CHOL, LDLCALC, HDL, TRIG in the last 168 hours.     Coagulation: No results for input(s): INR, APTT in the last 168 hours.    Invalid input(s): PT  Platelet Aggregation Study: No results for input(s): PLTAGG, PLTAGINTERP, PLTAGREGLACO, ADPPLTAGGREG in the last 168 hours.     Hgb A1C: No results for input(s): HGBA1C in the last 168 hours.     TSH: No results for input(s): TSH in the last 168 hours.    Diagnostic Results:    MRI Brain 17    Focus of diffusion restriction in the central missy. The location is somewhat atypical of infarcts from the pontine perforators.  The clinical presentation of seizures is also atypical for acute infarction.  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  Clinical correlation and short-term followup is recommended.    EEG pending      Yanira Lara PA-C  [facility]  Department of Vascular Neurology   [facility]

## 2017-11-04 LAB
ALBUMIN SERPL BCP-MCNC: 2.3 G/DL
ALP SERPL-CCNC: 242 U/L
ALT SERPL W/O P-5'-P-CCNC: 9 U/L
ANION GAP SERPL CALC-SCNC: 10 MMOL/L
AST SERPL-CCNC: 18 U/L
BASOPHILS # BLD AUTO: 0.06 K/UL
BASOPHILS NFR BLD: 0.9 %
BILIRUB SERPL-MCNC: 2.6 MG/DL
BLD PROD TYP BPU: NORMAL
BLOOD UNIT EXPIRATION DATE: NORMAL
BLOOD UNIT TYPE CODE: 5100
BLOOD UNIT TYPE: NORMAL
BUN SERPL-MCNC: 23 MG/DL
CALCIUM SERPL-MCNC: 8.5 MG/DL
CHLORIDE SERPL-SCNC: 101 MMOL/L
CO2 SERPL-SCNC: 26 MMOL/L
CODING SYSTEM: NORMAL
CREAT SERPL-MCNC: 3.5 MG/DL
DIFFERENTIAL METHOD: ABNORMAL
DISPENSE STATUS: NORMAL
EOSINOPHIL # BLD AUTO: 0 K/UL
EOSINOPHIL NFR BLD: 0 %
ERYTHROCYTE [DISTWIDTH] IN BLOOD BY AUTOMATED COUNT: 20.6 %
EST. GFR  (AFRICAN AMERICAN): 25.9 ML/MIN/1.73 M^2
EST. GFR  (NON AFRICAN AMERICAN): 22.4 ML/MIN/1.73 M^2
GLUCOSE SERPL-MCNC: 103 MG/DL
HCT VFR BLD AUTO: 23.5 %
HGB BLD-MCNC: 7.8 G/DL
IMM GRANULOCYTES # BLD AUTO: 0.03 K/UL
IMM GRANULOCYTES NFR BLD AUTO: 0.5 %
LYMPHOCYTES # BLD AUTO: 0.7 K/UL
LYMPHOCYTES NFR BLD: 10.2 %
MAGNESIUM SERPL-MCNC: 1.8 MG/DL
MCH RBC QN AUTO: 30.2 PG
MCHC RBC AUTO-ENTMCNC: 33.2 G/DL
MCV RBC AUTO: 91 FL
MONOCYTES # BLD AUTO: 0.3 K/UL
MONOCYTES NFR BLD: 4.8 %
NEUTROPHILS # BLD AUTO: 5.4 K/UL
NEUTROPHILS NFR BLD: 83.6 %
NRBC BLD-RTO: 0 /100 WBC
PHOSPHATE SERPL-MCNC: 3.3 MG/DL
PLATELET # BLD AUTO: 118 K/UL
PMV BLD AUTO: 10.7 FL
POTASSIUM SERPL-SCNC: 4.5 MMOL/L
PROT SERPL-MCNC: 4.8 G/DL
RBC # BLD AUTO: 2.58 M/UL
SODIUM SERPL-SCNC: 137 MMOL/L
TACROLIMUS BLD-MCNC: 2.5 NG/ML
TRANS ERYTHROCYTES VOL PATIENT: NORMAL ML
WBC # BLD AUTO: 6.47 K/UL

## 2017-11-04 PROCEDURE — 99233 SBSQ HOSP IP/OBS HIGH 50: CPT | Mod: 24,,, | Performed by: NURSE PRACTITIONER

## 2017-11-04 PROCEDURE — P9021 RED BLOOD CELLS UNIT: HCPCS

## 2017-11-04 PROCEDURE — 80197 ASSAY OF TACROLIMUS: CPT

## 2017-11-04 PROCEDURE — 20600001 HC STEP DOWN PRIVATE ROOM

## 2017-11-04 PROCEDURE — 83735 ASSAY OF MAGNESIUM: CPT

## 2017-11-04 PROCEDURE — 63600175 PHARM REV CODE 636 W HCPCS: Performed by: PHYSICIAN ASSISTANT

## 2017-11-04 PROCEDURE — 85025 COMPLETE CBC W/AUTO DIFF WBC: CPT

## 2017-11-04 PROCEDURE — 25000003 PHARM REV CODE 250: Performed by: NURSE PRACTITIONER

## 2017-11-04 PROCEDURE — 63600175 PHARM REV CODE 636 W HCPCS: Performed by: STUDENT IN AN ORGANIZED HEALTH CARE EDUCATION/TRAINING PROGRAM

## 2017-11-04 PROCEDURE — 99233 SBSQ HOSP IP/OBS HIGH 50: CPT | Mod: ,,, | Performed by: PSYCHIATRY & NEUROLOGY

## 2017-11-04 PROCEDURE — 25000003 PHARM REV CODE 250: Performed by: TRANSPLANT SURGERY

## 2017-11-04 PROCEDURE — 25000003 PHARM REV CODE 250: Performed by: STUDENT IN AN ORGANIZED HEALTH CARE EDUCATION/TRAINING PROGRAM

## 2017-11-04 PROCEDURE — 63600175 PHARM REV CODE 636 W HCPCS: Performed by: NURSE PRACTITIONER

## 2017-11-04 PROCEDURE — 25000003 PHARM REV CODE 250: Performed by: PHYSICIAN ASSISTANT

## 2017-11-04 PROCEDURE — 80053 COMPREHEN METABOLIC PANEL: CPT

## 2017-11-04 PROCEDURE — 84100 ASSAY OF PHOSPHORUS: CPT

## 2017-11-04 PROCEDURE — 80177 DRUG SCRN QUAN LEVETIRACETAM: CPT

## 2017-11-04 RX ORDER — HYDROCODONE BITARTRATE AND ACETAMINOPHEN 500; 5 MG/1; MG/1
TABLET ORAL
Status: DISCONTINUED | OUTPATIENT
Start: 2017-11-04 | End: 2017-11-08 | Stop reason: HOSPADM

## 2017-11-04 RX ADMIN — MYCOPHENOLATE MOFETIL 1000 MG: 250 CAPSULE ORAL at 09:11

## 2017-11-04 RX ADMIN — SODIUM BICARBONATE 650 MG TABLET 1300 MG: at 08:11

## 2017-11-04 RX ADMIN — BISACODYL 10 MG: 5 TABLET, COATED ORAL at 08:11

## 2017-11-04 RX ADMIN — THIAMINE HCL (VITAMIN B1) 50 MG TABLET 100 MG: at 08:11

## 2017-11-04 RX ADMIN — OXYCODONE AND ACETAMINOPHEN 1 TABLET: 10; 325 TABLET ORAL at 12:11

## 2017-11-04 RX ADMIN — THERA TABS 1 TABLET: TAB at 08:11

## 2017-11-04 RX ADMIN — FLUCONAZOLE 200 MG: 200 TABLET ORAL at 08:11

## 2017-11-04 RX ADMIN — DOCUSATE SODIUM 100 MG: 100 CAPSULE, LIQUID FILLED ORAL at 08:11

## 2017-11-04 RX ADMIN — ATOVAQUONE 1500 MG: 750 SUSPENSION ORAL at 09:11

## 2017-11-04 RX ADMIN — HEPARIN SODIUM 5000 UNITS: 5000 INJECTION, SOLUTION INTRAVENOUS; SUBCUTANEOUS at 05:11

## 2017-11-04 RX ADMIN — DOCUSATE SODIUM 100 MG: 100 CAPSULE, LIQUID FILLED ORAL at 06:11

## 2017-11-04 RX ADMIN — SODIUM BICARBONATE 650 MG TABLET 1300 MG: at 09:11

## 2017-11-04 RX ADMIN — OXYCODONE HYDROCHLORIDE AND ACETAMINOPHEN 1 TABLET: 5; 325 TABLET ORAL at 03:11

## 2017-11-04 RX ADMIN — HEPARIN SODIUM 5000 UNITS: 5000 INJECTION, SOLUTION INTRAVENOUS; SUBCUTANEOUS at 10:11

## 2017-11-04 RX ADMIN — LEVETIRACETAM 500 MG: 500 TABLET ORAL at 08:11

## 2017-11-04 RX ADMIN — OXYCODONE AND ACETAMINOPHEN 1 TABLET: 10; 325 TABLET ORAL at 09:11

## 2017-11-04 RX ADMIN — FAMOTIDINE 20 MG: 20 TABLET, FILM COATED ORAL at 09:11

## 2017-11-04 RX ADMIN — PREDNISONE 15 MG: 10 TABLET ORAL at 08:11

## 2017-11-04 RX ADMIN — OXYCODONE AND ACETAMINOPHEN 1 TABLET: 10; 325 TABLET ORAL at 05:11

## 2017-11-04 RX ADMIN — LEVETIRACETAM 500 MG: 500 TABLET ORAL at 09:11

## 2017-11-04 RX ADMIN — MYCOPHENOLATE MOFETIL 1000 MG: 250 CAPSULE ORAL at 08:11

## 2017-11-04 NOTE — PROCEDURES
DATE OF PROCEDURE:  11/03/2017    EEG #:  FH-.    REFERRING PHYSICIAN:  Norma Rutherford M.D.    This EEG was performed to assess for subclinical seizures.    ELECTROENCEPHALOGRAM REPORT     METHODOLOGY:  Electroencephalographic (EEG) recording is recorded with   electrodes placed according to the International 10-20 placement system.  Thirty   two (32) channels of digital signal (sampling rate of 512/sec), including T1   and T2, were simultaneously recorded from the scalp and may include EKG, EMG,   and/or eye monitors.  Recording band pass was 0.1 to 512 Hz.  Digital video   recording of the patient is simultaneously recorded with the EEG.  The patient   is instructed to report clinical symptoms which may occur during the recording   session.  EEG and video recording are stored and archived in digital format.    Activation procedures, which include photic stimulation, hyperventilation and   instructing patients to perform simple tasks, are done in selected patients  The EEG is displayed on a monitor screen and can be reviewed using different   montages.  Computer assisted-analysis is employed to detect spike and   electrographic seizure activity.   The entire record is submitted for computer   analysis.  The entire recording is visually reviewed, and the times identified   by computer analysis as being spikes or seizures are reviewed again.    Compressed spectral analysis (CSA) is also performed on the activity recorded   from each individual channel.  This is displayed as a power display of   frequencies from 0 to 30 Hz over time.   The CSA is reviewed looking for   asymmetries in power between homologous areas of the scalp, then compared with   the original EEG recording.    [employer] software was also utilized in the review of this study.  This software   suite analyzes the EEG recording in multiple domains.  Coherence and rhythmicity   are computed to identify EEG sections which may contain organized  seizures.    Each channel undergoes analysis to detect the presence of spike and sharp waves   which have special and morphological characteristics of epileptic activity.  The   routine EEG recording is converted from special into frequency domain.  This is   then displayed comparing homologous areas to identify areas of significant   asymmetry.  Algorithm to identify non-cortically generated artifact is used to   separate artifact from the EEG.       EEG FINDINGS:  The recording was obtained with a number of standard bipolar and   referential montages during wakefulness, drowsiness and sleep.  In the alert   state, the posterior background rhythm was a symmetric, well-modulated 9 Hz   alpha rhythm, which reacted symmetrically to eye opening.  Mixed delta and   occasional theta range slowing was noted throughout wakefulness.  Activation   procedures were not performed.  During drowsiness, the background rhythm waxed   and waned and there were periods of slowing.  During stage II sleep, symmetric V   waves and sleep spindles were noted.  There were no interictal epileptiform   abnormalities.  No clinical or electrographic seizures were recorded.  The EKG   channel revealed sinus tachycardia.    IMPRESSION:  This is an abnormal EEG during wakefulness, drowsiness and sleep.    Mixed diffuse irregular slowing was noted.    CLINICAL CORRELATION:  The patient is a 27-year-old male with a history of   hepatic transplant who presented after witnessed seizures and is currently   maintained on Keppra.  This is an abnormal EEG during wakefulness, drowsiness   and sleep.  The overall degree of intermixed slowing, which was diffuse is   suggestive of a mild-to-moderate encephalopathy, nonspecific to the cause.  This   likely represents a toxic metabolic encephalopathy.  There is no evidence of an   epileptic process on this recording.  No seizures were recorded during this   study.  Of note, the EKG channel revealed sinus  tachycardia.      FAK/HN  dd: 11/04/2017 11:33:47 (CDT)  td: 11/04/2017 11:55:25 (CDT)  Doc ID   #[id number]  Job ID #[id number]    CC:

## 2017-11-04 NOTE — ASSESSMENT & PLAN NOTE
- Seizure 11/2/17 at 10am and 330pm.  Ativan NOT needed.  Seizures last 10-15 sec, started w stuttering same syllable, followed by eyes rolling back and full body convulsing.  Pt had postictal symptoms, drowsiness, tearful and sleepy for about 45 minutes. Prograf discontinued 11/2.  Neuro consulted- recommended Keppra load w 1gm IV f/b 500 mg PO bid.  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  The clinical presentation of seizures is also atypical for acute infarction.  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    - EEG 11/3/17- RESULTS PENDING.   - Apprec Neuro recs.  Need to get recs for when to repeat imaging.

## 2017-11-04 NOTE — ASSESSMENT & PLAN NOTE
27 year old man with alcohol abuse, s/p OLT, and LAURA requiring HD who had an episode consistent with seizure on 11/2. He is at risk for seizure due to his metabolic derangements and immunosuppressant medication, as well as self reported sleep deprivation. At this time we do not recommend starting an AED as the risks outweigh the benefits in a first time seizure. However if he has any further events he will require treatment. Remains seizure free last night.     MRI brain showed area of diffusion restriction in the missy consistent with central pontine myelinolysis. This is likely multifactorial in the setting of liver transplant, immunosuppression and metabolic derangements however it's unclear if this is related to his seizure.     -- Follow EEG   -- Continue levetiracetam 500mg BID  -- Seizure precautions  -- Follow up with Dr Norma Duvall (Neurology) as an out patient   -- Case discussed with Dr Spann   -- Will signoff

## 2017-11-04 NOTE — ASSESSMENT & PLAN NOTE
- Continue prednisone taper, Cellcept.  - Will monitor for signs of toxic side effects, check daily tacrolimus troughs, and change meds accordingly.  - Discontinued Prograf 11/2/17.    -likely start cyclosporin

## 2017-11-04 NOTE — PROGRESS NOTES
[facility]  Liver Transplant  Progress Note    Patient Name: Jhonny Diana  MRN: [id number]  Admission Date: 2017  Hospital Length of Stay: 38 days  Code Status: Full Code  Primary Care Provider: Provider Notinsystem  Post-Operative Day: 16    ORGAN:   LIVER  Disease Etiology: Acute Alcoholic Hepatitis  Donor Type:    - Brain Death  CDC High Risk:   No  Donor CMV Status:   Donor CMV Status: Positive  Donor HBcAB:   Negative  Donor HCV Status:   Negative  Whole or Partial: Whole Liver  Biliary Anastomosis: End to End  Arterial Anatomy: Standard  Subjective:     History of Present Illness:  Jhonny Diana is a 28 yo male with hx of childhood asthma and heavy alcohol abuse (fifth of liquor daily since teenager, last drink 2.5 wks ago per mother) who was transferred to [facility] for higher level of care/liver transplant evaluation. Patient initially presented to OSH for a 2 mo hx of progressively worsening abdominal pain with associated fatigue, jaundice, and abdominal distention with ~20# weight gain.  Reports family hx of alcoholic cirrhosis in father. Pt was recently diagnosed with alcoholic hepatitis during his admission at OSH on -9/15. During that hospitalization, patient was given ceftriaxone for SBP ppx, prednisone for alcoholic hepatitis, and diuresed. Pt was discharged and instructed to follow up with PCP and AA within 1 wk. After his discharge, patient returned to ED 4 days after for increased weight gain and worsening abdominal pain/jaundice.     Mr. Diana was admitted to the [facility] on  for decompensated alcoholic hepatitis with high MELD score, severe HE, and LAURA. Hepatology consulted and followed to evaluate liver transplant candidacy. Pt started on albumin and lasix for LAURA and possibility for HRS. However, nephrology felt that LAURA is likely multifactorial: ATN given hypotensive episodes at OSH and toxic given severe hyperbilirubinemia; not truly HRS. Lactulose/rifaximin started  for HE and mental status slowly improved. Mr. Diana with significant ABD pain since admission with ABD distension. CT abdomen was significant for pericolonic fat stranding of right colon; no obstruction. Also with diffuse mesenteric edema. No significant ascites noted, no cholecystitis/cholelithiasis/ductal dilation. US liver with doppler showed appropriate vasculature.  He was stepped down to Hospital Medicine on 10/5.  Paracentesis was done on 10/9 with 3.3L removed and 10/12 with 1.8 removed, no infection. His diet was advanced and Dietary was consulted to help get adequate caloric intake. Listed for liver transplant 10/17 with MELD 36.    Pt received organ offer and underwent liver txp 10/19 which took place without complication.  Steroid induction used, CMV +/+.  Post operatively, LFTs/enzymes trended down nicely.  POD 5 U/S with elevated RIs and sluggish flow in HA system, otherwise ok.  Pt with LAURA prior to and s/p txp requiring dialysis.  He is tolerating HD with last HD 10/30.      Hospital Course:  Interval hx:  No seizure overnight.  Last seizure 11/2/17 at 330pm.  Ativan NOT needed.  After second seizure, loaded keppra 1gm IV and started Keppra 500mg PO bid.  Tolerated HD 11/3 per permcath.  Blood cx sent 11/2 after first seizure- prelim NGTD.  Prograf remains off.  MRI 11/2/17, results reviewed.  EEG results pending.  Neuro following.  Stroke team has signed off.  Chevron opened additional 8 cm, staples removed.  Placed vera flow wd vac(originally opened 10/27, vac placed 10/29).  H/h lower today, plan to transfuse 1 unit prbc.  Pt's mom reported intermittent delirium/emotional lability.  Psych was consulted no plan for medications at this time.  Low grade fever over night - monitor closely.      2D echo r/o emboli negative embolus showed small pericardial effusion, PA pressure 33.  Pt is on HD, s/w cardiology on call.  Cont to michelle, cont tele and monitor.    Scheduled Meds:   atovaquone  1,500 mg  Oral Daily    bisacodyl  10 mg Oral Daily    docusate sodium  100 mg Oral TID PC    epoetin maurisio  5,000 Units Intravenous Every Mon, Wed, Fri    ergocalciferol  50,000 Units Oral Q7 Days    famotidine  20 mg Oral QHS    fluconazole  200 mg Oral Daily    heparin (porcine)  5,000 Units Subcutaneous Q8H    levETIRAcetam  500 mg Oral BID    multivitamin  1 tablet Oral Daily    mycophenolate  1,000 mg Oral BID    polyethylene glycol  17 g Oral Daily    predniSONE  15 mg Oral Daily    sodium bicarbonate  1,300 mg Oral BID    thiamine  100 mg Oral Daily    valganciclovir 50 mg/ml  100 mg Oral Every Mon, Wed, Fri     Continuous Infusions:     PRN Meds:sodium chloride, sodium chloride, sodium chloride 0.9%, bisacodyl, dextrose 50%, dextrose 50%, glucagon (human recombinant), glucose, glucose, heparin (porcine), insulin aspart, ondansetron, oxyCODONE-acetaminophen, oxyCODONE-acetaminophen, simethicone    Review of Systems   Constitutional: Positive for activity change (decreased), appetite change (improving) and fatigue. Negative for chills, diaphoresis and fever.   HENT: Negative for congestion, mouth sores and trouble swallowing.    Eyes: Negative for visual disturbance.   Respiratory: Negative for cough, choking, shortness of breath and wheezing.    Cardiovascular: Negative for chest pain, palpitations and leg swelling.   Gastrointestinal: Positive for abdominal distention and abdominal pain. Negative for constipation, diarrhea, nausea and vomiting.   Endocrine: Negative.    Genitourinary: Negative for decreased urine volume, difficulty urinating, dysuria, hematuria and urgency.   Musculoskeletal: Negative for arthralgias, back pain and joint swelling.   Skin: Positive for wound. Negative for color change, pallor and rash.   Allergic/Immunologic: Positive for immunocompromised state.   Neurological: Positive for weakness. Negative for dizziness, tremors, seizures, syncope and headaches.   Hematological:  Bruises/bleeds easily.   Psychiatric/Behavioral: Positive for sleep disturbance. Negative for agitation, confusion, decreased concentration, dysphoric mood, hallucinations and suicidal ideas. The patient is not nervous/anxious.    All other systems reviewed and are negative.    Objective:     Vital Signs (Most Recent):  Temp: 98.7 °F (37.1 °C) (11/04/17 0745)  Pulse: 89 (11/04/17 0745)  Resp: 18 (11/04/17 0745)  BP: 124/74 (11/04/17 0745)  SpO2: 98 % (11/04/17 0745) Vital Signs (24h Range):  Temp:  [98.6 °F (37 °C)-100.4 °F (38 °C)] 98.7 °F (37.1 °C)  Pulse:  [] 89  Resp:  [16-20] 18  SpO2:  [95 %-100 %] 98 %  BP: (108-149)/(56-82) 124/74     Weight: 78.1 kg (172 lb 2.9 oz)  Body mass index is 26.97 kg/m².    Intake/Output - Last 3 Shifts       11/02 0700 - 11/03 0659 11/03 0700 - 11/04 0659 11/04 0700 - 11/05 0659    P.O. 810 480     I.V. (mL/kg) 1130.8 (15.2)      Blood  350     Other  950     IV Piggyback 50      Total Intake(mL/kg) 1990.8 (26.8) 1780 (22.8)     Urine (mL/kg/hr) 25 (0) 220 (0.1)     Emesis/NG output  0 (0)     Other  2950 (1.6)     Stool 0 (0) 0 (0)     Blood  0 (0)     Total Output 25 3170      Net +1965.8 -1390             Urine Occurrence  0 x     Stool Occurrence 0 x 0 x     Emesis Occurrence  0 x           Physical Exam   Constitutional: He is oriented to person, place, and time. He appears well-developed. No distress.   Temporal and distal extremity muscle wasting   HENT:   Head: Normocephalic and atraumatic.   Mouth/Throat: No oropharyngeal exudate.   Eyes: EOM are normal. Pupils are equal, round, and reactive to light. Scleral icterus is present.   Neck: Normal range of motion. Neck supple.   Cardiovascular: Normal rate, regular rhythm and normal heart sounds.  Exam reveals no gallop and no friction rub.    No murmur heard.  Pulmonary/Chest: Effort normal. No respiratory distress. He has no rales.   Dec R base   Abdominal: Soft. Bowel sounds are normal. He exhibits distension. There  is no tenderness.   [facility] to [facility] lateral side x 7 cm  Serosang output   Musculoskeletal: Normal range of motion. He exhibits edema (abdominal edema >right lower quadrant).   Neurological: He is alert and oriented to person, place, and time.   Skin: Skin is warm and dry. Capillary refill takes 2 to 3 seconds. He is not diaphoretic.   jaundiced   Psychiatric: He has a normal mood and affect. His behavior is normal. Judgment and thought content normal.   Nursing note and vitals reviewed.    Laboratory:  Immunosuppressants         Stop Route Frequency     mycophenolate capsule 1,000 mg      -- Oral 2 times daily        CBC:     Recent Labs  Lab 11/04/17  0505   WBC 6.47   RBC 2.58*   HGB 7.8*   HCT 23.5*   *   MCV 91   MCH 30.2   MCHC 33.2     CMP:     Recent Labs  Lab 11/04/17  0505      CALCIUM 8.5*   ALBUMIN 2.3*   PROT 4.8*      K 4.5   CO2 26      BUN 23*   CREATININE 3.5*   ALKPHOS 242*   ALT 9*   AST 18   BILITOT 2.6*     Tacrolimus Lvl   Date Value Ref Range Status   11/04/2017 2.5 (L) 5.0 - 15.0 ng/mL Final     Comment:     Testing performed by Liquid Chromatography-Tandem  Mass Spectrometry (LC-MS/MS).  This test was developed and its performance characteristics  determined by [facility], Department of Pathology  and Laboratory Medicine in a manner consistent with CLIA  requirements. It has not been cleared or approved by the [place]  Food and Drug Administration.  This test is used for clinical   purposes.  It should not be regarded as investigational or for  research.     11/03/2017 5.0 5.0 - 15.0 ng/mL Final     Comment:     Testing performed by Liquid Chromatography-Tandem  Mass Spectrometry (LC-MS/MS).  This test was developed and its performance characteristics  determined by [facility], Department of Pathology  and Laboratory Medicine in a manner consistent with CLIA  requirements. It has not been cleared or approved by the [place]  Food and  Drug Administration.  This test is used for clinical   purposes.  It should not be regarded as investigational or for  research.     11/02/2017 6.1 5.0 - 15.0 ng/mL Final     Comment:     Testing performed by Liquid Chromatography-Tandem  Mass Spectrometry (LC-MS/MS).  This test was developed and its performance characteristics  determined by [facility], Department of Pathology  and Laboratory Medicine in a manner consistent with CLIA  requirements. It has not been cleared or approved by the [place]  Food and Drug Administration.  This test is used for clinical   purposes.  It should not be regarded as investigational or for  research.     10/31/2017 7.9 5.0 - 15.0 ng/mL Final     Comment:     Testing performed by Liquid Chromatography-Tandem  Mass Spectrometry (LC-MS/MS).  This test was developed and its performance characteristics  determined by [facility], Department of Pathology  and Laboratory Medicine in a manner consistent with CLIA  requirements. It has not been cleared or approved by the [place]  Food and Drug Administration.  This test is used for clinical   purposes.  It should not be regarded as investigational or for  research.         Labs within the past 24 hours have been reviewed.    Diagnostic Results:  I have personally reviewed all pertinent imaging studies.    Assessment/Plan:     * Liver transplanted    - LFTs/enzymes trended down nicely.  Alk phos elevated POD #14- monitor closely.   - Cont bowel regimen.  - Transitioned pain control to scheduled acetaminophen + Tramadol as hallucinations with oxycodone- tolerating well.  - Liver US POD 5 with elevated RIs and slowed flow HA system.  US 10/23 technically difficult exam w difficulty visualizing arterial system and slow flow w no tardus parvus.  Repeat liver US POD# 13 showed continued sluggish flow in HA system.    - ASA started 11/2/17.  - Wound vac placed 10/29. Wd opened additional 8 cm 11/3-  veraflow wd vac placed  afterwards.          Seizure    - Seizure 11/2/17 at 10am and 330pm.  Ativan NOT needed.  Seizures last 10-15 sec, started w stuttering same syllable, followed by eyes rolling back and full body convulsing.  Pt had postictal symptoms, drowsiness, tearful and sleepy for about 45 minutes. Prograf discontinued 11/2.  Neuro consulted- recommended Keppra load w 1gm IV f/b 500 mg PO bid.  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  The clinical presentation of seizures is also atypical for acute infarction.  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    - EEG 11/3/17- RESULTS PENDING.   - Apprec Neuro recs.  Need to get recs for when to repeat imaging.                LAURA (acute kidney injury)    - Nephrology following.  Apprec recs.  - Tolerated HD 11/03/17- tolerated well.  - Permcath placed 10/30/17  - Set up for outpt HD  - of note, small pleural effusion, PA pressure 33.  Did NOT consult cardiology officially but s/w them per phone- they recommend cont to diurese, tele and monitor.  Consult as necessary.          Acute blood loss anemia    - PRBC x 2 u 10/26 and responded nicely  - h/h has trended down.  HGb this am 7.1.  Old clot removed from chevron, bleeding subsided w pressure.  Transfused 1 u PRBC w appropriate response- repeat hgb 9.2.    - plan to start Procrit MWF w HD.    -transfuse 1 unit prbc 11/4        Abnormal finding on MRI of brain    - MRI findings 11/2 following second seizure-  Focus of diffusion restriction in the central missy. The location is somewhat atypical of infarcts from the pontine perforators.  The clinical presentation of seizures is also atypical for acute infarction.  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  Clinical correlation and short-term followup is recommended.  - stroke team consulted.  Not likely stroke.  [facility]  [facility]  - Neurology consulted and following.   Apprec recs.  Need recs regarding when to repeat imaging.            Physical deconditioning    - Working with PT/OT  - Rehab consult placed- on hold for seizure activity.          At risk for opportunistic infections    - D/C Dapsone secondary to worsening anemia and continue atovaquone for PCP prophylaxis  - Continue Valcyte for CMV prophylaxis  - Continue fluconazole for fungal prophylaxis          Long-term use of immunosuppressant medication    - See prophylactic immunotherapy          Prophylactic immunotherapy    - Continue prednisone taper, Cellcept.  - Will monitor for signs of toxic side effects, check daily tacrolimus troughs, and change meds accordingly.  - Discontinued Prograf 11/2/17.    -likely start cyclosporin        Severe malnutrition    - Dietary consulted and following.   - Continue protein powder        Anasarca    - 2/2 liver disease, LAURA, and hypoalbuminemia  - Expect improvement once kidneys improve.    - Attempt to keep dressings dry  - cont to diurese per HD.         Alcoholism /alcohol abuse    - Psych consulted, high risk for transplant. WILL NEED to complete ABU program s/p transplant              VTE Risk Mitigation         Ordered     heparin (porcine) injection 5,000 Units  Every 8 hours     Route:  Subcutaneous        10/30/17 0703     heparin (porcine) injection 1,000 Units  As needed (PRN)     Route:  Intra-Catheter        10/30/17 1649     Place sequential compression device  Until discontinued      10/20/17 0346     High Risk of VTE  Once      10/20/17 0346          The patients clinical status was discussed at multidisplinary rounds, involving transplant surgery, transplant medicine, pharmacy, nursing, nutrition, and social work    Discharge Planning: not candidate at this time  Monitor kidney function --> may need outpt HD  Monitor HH needs vs rehab for deconditioned status      Seda Camacho, NP  Liver Transplant  [facility]

## 2017-11-04 NOTE — ASSESSMENT & PLAN NOTE
- PRBC x 2 u 10/26 and responded nicely  - h/h has trended down.  HGb this am 7.1.  Old clot removed from chevron, bleeding subsided w pressure.  Transfused 1 u PRBC w appropriate response- repeat hgb 9.2.    - plan to start Procrit MWF w HD.    -transfuse 1 unit prbc 11/4

## 2017-11-04 NOTE — PROGRESS NOTES
[facility]  Neurology  Progress Note    Patient Name: Jhonny Diana  MRN: [id number]  Admission Date: 9/27/2017  Hospital Length of Stay: 38 days  Code Status: Full Code   Attending Provider: Solis Chapa MD  Primary Care Physician: Provider Notinsystem   Principal Problem:Liver replaced by transplant      Subjective:     Interval History:   11/2: Generalized convulsion x2, 20 seconds. Loaded with keppra 1g, followed by 500mg BID  11/3: MRI with small area of diffusion restriction in missy, concerning for CPM  11/4: No acute events overnight. Well oriented to time place and person.      Current Neurological Medications: Keppra    Current Facility-Administered Medications   Medication Dose Route Frequency Provider Last Rate Last Dose    0.9%  NaCl infusion (for blood administration)   Intravenous Q24H PRN Norma Blackwood NP        0.9%  NaCl infusion (for blood administration)   Intravenous Q24H PRN Seda Camacho NP        0.9%  NaCl infusion   Intravenous PRN Win Gentile MD        atovaquone suspension 1,500 mg  1,500 mg Oral Daily Geronimo Vera PA-C   1,500 mg at 11/04/17 0901    bisacodyl EC tablet 10 mg  10 mg Oral Daily Geronimo Vera PA-C   10 mg at 11/04/17 0859    bisacodyl suppository 10 mg  10 mg Rectal Daily PRN Jenna M. Oppenheimer, PA-CHARI   10 mg at 11/01/17 0337    dextrose 50% injection 12.5 g  12.5 g Intravenous PRN Jazmine Bejarano DNP, NP        dextrose 50% injection 25 g  25 g Intravenous PRN Jazmine Bejarano DNP, NP        docusate sodium capsule 100 mg  100 mg Oral TID PC ANA CRISTINA HartC   100 mg at 11/04/17 0853    epoetin maurisio injection 5,000 Units  5,000 Units Intravenous Every Mon, Wed, Fri Norma Blackwood NP   5,000 Units at 11/03/17 1624    ergocalciferol capsule 50,000 Units  50,000 Units Oral Q7 Days Hoda Smith MD   50,000 Units at 10/31/17 0947    famotidine tablet 20 mg  20 mg Oral QHS Solis Chapa MD   20 mg at  11/03/17 2006    fluconazole tablet 200 mg  200 mg Oral Daily Hoda Smith MD   200 mg at 11/04/17 0854    glucagon (human recombinant) injection 1 mg  1 mg Intramuscular PRN Jazmine Bejarano DNP, NP        glucose chewable tablet 16 g  16 g Oral PRN Jazmine Bejarano DNP, NP        glucose chewable tablet 24 g  24 g Oral PRN Jazmine Bejarano DNP, NP        heparin (porcine) injection 1,000 Units  1,000 Units Intra-Catheter PRN Moon Higgins MD   1,000 Units at 11/03/17 1132    heparin (porcine) injection 5,000 Units  5,000 Units Subcutaneous Q8H Geronimo Vera PA-C   5,000 Units at 11/04/17 0507    insulin aspart pen 0-5 Units  0-5 Units Subcutaneous QID (AC + HS) PRN Jazmine Bejarano DNP, NP        levETIRAcetam tablet 500 mg  500 mg Oral BID Norma Blackwood NP   500 mg at 11/04/17 0853    multivitamin tablet 1 tablet  1 tablet Oral Daily Norma Blackwood NP   1 tablet at 11/04/17 0854    mycophenolate capsule 1,000 mg  1,000 mg Oral BID Hoda Smith MD   1,000 mg at 11/04/17 0855    ondansetron disintegrating tablet 8 mg  8 mg Oral Q8H PRN Norma Blackwood NP   8 mg at 11/03/17 1820    oxyCODONE-acetaminophen  mg per tablet 1 tablet  1 tablet Oral Q4H PRN Geronimo Vera PA-C   1 tablet at 11/04/17 0513    oxyCODONE-acetaminophen 5-325 mg per tablet 1 tablet  1 tablet Oral Q4H PRN Geronimo Vera PA-C   1 tablet at 11/03/17 0544    polyethylene glycol packet 17 g  17 g Oral Daily Geronimo Vera PA-C   17 g at 11/01/17 1640    predniSONE tablet 15 mg  15 mg Oral Daily Sedaemma Camacho NP   15 mg at 11/04/17 0852    simethicone chewable tablet 80 mg  1 tablet Oral TID PRN Geronimo Vera PA-C   80 mg at 11/02/17 0817    sodium bicarbonate tablet 1,300 mg  1,300 mg Oral BID Geronimo Vera PA-C   1,300 mg at 11/04/17 0854    thiamine tablet 100 mg  100 mg Oral Daily Norma Blackwood NP   100 mg at 11/04/17 0852    valganciclovir 50  mg/ml oral solution 100 mg  100 mg Oral Every Mon, Wed, Fri Jenna M. Oppenheimer, PA-C   100 mg at 11/03/17 1236       Review of Systems   Constitutional: Positive for fatigue.   Respiratory: Negative for chest tightness and shortness of breath.    Cardiovascular: Positive for leg swelling. Negative for chest pain.   Gastrointestinal: Negative for abdominal pain.   Neurological: Negative for seizures and weakness.     Objective:     Vital Signs (Most Recent):  Temp: 98.7 °F (37.1 °C) (11/04/17 0745)  Pulse: 89 (11/04/17 0745)  Resp: 18 (11/04/17 0745)  BP: 124/74 (11/04/17 0745)  SpO2: 98 % (11/04/17 0745) Vital Signs (24h Range):  Temp:  [98.6 °F (37 °C)-100.4 °F (38 °C)] 98.7 °F (37.1 °C)  Pulse:  [] 89  Resp:  [16-20] 18  SpO2:  [95 %-100 %] 98 %  BP: (108-129)/(56-82) 124/74     Weight: 78.1 kg (172 lb 2.9 oz)  Body mass index is 26.97 kg/m².    Physical Exam   Constitutional: He is oriented to person, place, and time.   Eyes: EOM are normal. Pupils are equal, round, and reactive to light. Scleral icterus is present.   Cardiovascular: Normal rate.    Pulmonary/Chest: Effort normal. No respiratory distress.   Musculoskeletal: He exhibits edema.   Neurological: He is oriented to person, place, and time. He has a normal Finger-Nose-Finger Test.   Skin:   jaundice       NEUROLOGICAL EXAMINATION:     MENTAL STATUS   Oriented to person, place, and time.     CRANIAL NERVES     CN III, IV, VI   Pupils are equal, round, and reactive to light.  Extraocular motions are normal.     CN V   Facial sensation intact.     CN VII   Facial expression full, symmetric.     MOTOR EXAM        Moves all extremities     SENSORY EXAM   Light touch normal.     GAIT AND COORDINATION      Coordination   Finger to nose coordination: normal      Significant Labs:   CBC:     Recent Labs  Lab 11/03/17  0426 11/03/17  0740 11/03/17  1438 11/04/17  0505   WBC 8.20  --  10.54 6.47   HGB 7.4* 7.1* 9.2* 7.8*   HCT 21.9* 21.4* 26.9* 23.5*   PLT  141*  --  102* 118*     CMP:     Recent Labs  Lab 11/03/17  0425 11/03/17  0426 11/04/17  0505 11/04/17  0506   GLU 74  --  103  --    *  --  137  --    K 4.1  --  4.5  --      --  101  --    CO2 20*  --  26  --    BUN 27*  --  23*  --    CREATININE 3.9*  --  3.5*  --    CALCIUM 8.3*  --  8.5*  --    MG  --  1.9  --  1.8   PROT 4.5*  --  4.8*  --    ALBUMIN 2.2*  --  2.3*  --    BILITOT 2.4*  --  2.6*  --    ALKPHOS 216*  --  242*  --    AST 16  --  18  --    ALT 8*  --  9*  --    ANIONGAP 11  --  10  --    EGFRNONAA 19.7*  --  22.4*  --        Significant Imaging: MRI brain without contrast: small area of diffusion restriction within the missy. Otherwise no acute abnormalities.      Assessment and Plan:     * Liver replaced by transplant    -- Managed by Transplant team          Seizure    27 year old man with alcohol abuse, s/p OLT, and LAURA requiring HD who had an episode consistent with seizure on 11/2. He is at risk for seizure due to his metabolic derangements and immunosuppressant medication, as well as self reported sleep deprivation. At this time we do not recommend starting an AED as the risks outweigh the benefits in a first time seizure. However if he has any further events he will require treatment. Remains seizure free last night.     MRI brain showed area of diffusion restriction in the missy consistent with central pontine myelinolysis. This is likely multifactorial in the setting of liver transplant, immunosuppression and metabolic derangements however it's unclear if this is related to his seizure.     -- Follow EEG   -- Continue levetiracetam 500mg BID  -- Seizure precautions  -- Follow up with Dr Norma Duvall (Neurology) as an out patient   -- Case discussed with Dr Spann   -- Will signoff               Long-term use of immunosuppressant medication    -- lowers seizure threshold  -- on mycophenolate  -- tacrolimus d/c 11/2        LAURA (acute kidney injury)    -- risk factor for  seizures  -- on HD            VTE Risk Mitigation         Ordered     heparin (porcine) injection 5,000 Units  Every 8 hours     Route:  Subcutaneous        10/30/17 0703     heparin (porcine) injection 1,000 Units  As needed (PRN)     Route:  Intra-Catheter        10/30/17 1649     Place sequential compression device  Until discontinued      10/20/17 0346     High Risk of VTE  Once      10/20/17 0346          Monse Peters II, MD  Neurology  [facility]

## 2017-11-04 NOTE — SUBJECTIVE & OBJECTIVE
Subjective:     Interval History: No acute events overnight. He had 2 convulsive episodes yesterday, but none since. He was loaded with keppra and scheduled maintenance keppra. He denies any new issues.    Current Neurological Medications: Keppra    Current Facility-Administered Medications   Medication Dose Route Frequency Provider Last Rate Last Dose    0.9%  NaCl infusion (for blood administration)   Intravenous Q24H PRN Norma Blackwood NP        0.9%  NaCl infusion (for blood administration)   Intravenous Q24H PRN Seda Camacho NP        0.9%  NaCl infusion   Intravenous PRN Win Gentile MD        atovaquone suspension 1,500 mg  1,500 mg Oral Daily Geronimo Vera PA-C   1,500 mg at 11/04/17 0901    bisacodyl EC tablet 10 mg  10 mg Oral Daily Geronimo Vera PA-C   10 mg at 11/04/17 0859    bisacodyl suppository 10 mg  10 mg Rectal Daily PRN Jenna M. Oppenheimer, PA-C   10 mg at 11/01/17 0337    dextrose 50% injection 12.5 g  12.5 g Intravenous PRN Jazmine Bejarano DNP, NP        dextrose 50% injection 25 g  25 g Intravenous PRN Jazmine Bejarano DNP, NP        docusate sodium capsule 100 mg  100 mg Oral TID PC Geronimo Vera PA-C   100 mg at 11/04/17 0853    epoetin maurisio injection 5,000 Units  5,000 Units Intravenous Every Mon, Wed, Fri Norma Blackwood NP   5,000 Units at 11/03/17 1624    ergocalciferol capsule 50,000 Units  50,000 Units Oral Q7 Days Hoda Smith MD   50,000 Units at 10/31/17 0947    famotidine tablet 20 mg  20 mg Oral QHS Solis Chapa MD   20 mg at 11/03/17 2006    fluconazole tablet 200 mg  200 mg Oral Daily Hoda Smith MD   200 mg at 11/04/17 0854    glucagon (human recombinant) injection 1 mg  1 mg Intramuscular PRN Jazmine Bejarano DNP, NP        glucose chewable tablet 16 g  16 g Oral PRN Jazmine M. Carlee, DNP, NP        glucose chewable tablet 24 g  24 g Oral PRN Jazmine Bejarano, MATILDE, NP        heparin (porcine) injection  1,000 Units  1,000 Units Intra-Catheter PRN Moon Higgins MD   1,000 Units at 11/03/17 1132    heparin (porcine) injection 5,000 Units  5,000 Units Subcutaneous Q8H Geronimo Vera PA-C   5,000 Units at 11/04/17 0507    insulin aspart pen 0-5 Units  0-5 Units Subcutaneous QID (AC + HS) PRN Jazmine Bejarano, DNP, NP        levETIRAcetam tablet 500 mg  500 mg Oral BID Norma Blackwood NP   500 mg at 11/04/17 0853    multivitamin tablet 1 tablet  1 tablet Oral Daily Norma Blackwood NP   1 tablet at 11/04/17 0854    mycophenolate capsule 1,000 mg  1,000 mg Oral BID Hoda Smith MD   1,000 mg at 11/04/17 0855    ondansetron disintegrating tablet 8 mg  8 mg Oral Q8H PRN Norma Blackwood NP   8 mg at 11/03/17 1820    oxyCODONE-acetaminophen  mg per tablet 1 tablet  1 tablet Oral Q4H PRN Geronimo Vera PA-C   1 tablet at 11/04/17 0513    oxyCODONE-acetaminophen 5-325 mg per tablet 1 tablet  1 tablet Oral Q4H PRN Geronimo Vera PA-C   1 tablet at 11/03/17 0544    polyethylene glycol packet 17 g  17 g Oral Daily Geronimo Vera PA-C   17 g at 11/01/17 1640    predniSONE tablet 15 mg  15 mg Oral Daily Seda T Doug NP   15 mg at 11/04/17 0852    simethicone chewable tablet 80 mg  1 tablet Oral TID PRN Geronimo Vera PA-C   80 mg at 11/02/17 0817    sodium bicarbonate tablet 1,300 mg  1,300 mg Oral BID Geronimo Vera PA-C   1,300 mg at 11/04/17 0854    thiamine tablet 100 mg  100 mg Oral Daily Norma Blackwood NP   100 mg at 11/04/17 0852    valganciclovir 50 mg/ml oral solution 100 mg  100 mg Oral Every Mon, Wed, Fri Jenna M. Oppenheimer, PA-C   100 mg at 11/03/17 1236       Review of Systems   Constitutional: Positive for fatigue.   Respiratory: Negative for chest tightness and shortness of breath.    Cardiovascular: Positive for leg swelling. Negative for chest pain.   Gastrointestinal: Negative for abdominal pain.   Neurological: Negative for seizures  and weakness.     Objective:     Vital Signs (Most Recent):  Temp: 98.7 °F (37.1 °C) (11/04/17 0745)  Pulse: 89 (11/04/17 0745)  Resp: 18 (11/04/17 0745)  BP: 124/74 (11/04/17 0745)  SpO2: 98 % (11/04/17 0745) Vital Signs (24h Range):  Temp:  [98.6 °F (37 °C)-100.4 °F (38 °C)] 98.7 °F (37.1 °C)  Pulse:  [] 89  Resp:  [16-20] 18  SpO2:  [95 %-100 %] 98 %  BP: (108-129)/(56-82) 124/74     Weight: 78.1 kg (172 lb 2.9 oz)  Body mass index is 26.97 kg/m².    Physical Exam   Constitutional: He is oriented to person, place, and time.   Eyes: EOM are normal. Pupils are equal, round, and reactive to light. Scleral icterus is present.   Cardiovascular: Normal rate.    Pulmonary/Chest: Effort normal. No respiratory distress.   Musculoskeletal: He exhibits edema.   Neurological: He is oriented to person, place, and time. He has a normal Finger-Nose-Finger Test.   Skin:   jaundice       NEUROLOGICAL EXAMINATION:     MENTAL STATUS   Oriented to person, place, and time.     CRANIAL NERVES     CN III, IV, VI   Pupils are equal, round, and reactive to light.  Extraocular motions are normal.     CN V   Facial sensation intact.     CN VII   Facial expression full, symmetric.     MOTOR EXAM        Moves all extremities     SENSORY EXAM   Light touch normal.     GAIT AND COORDINATION      Coordination   Finger to nose coordination: normal      Significant Labs:   CBC:     Recent Labs  Lab 11/03/17 0426 11/03/17  0740 11/03/17  1438 11/04/17  0505   WBC 8.20  --  10.54 6.47   HGB 7.4* 7.1* 9.2* 7.8*   HCT 21.9* 21.4* 26.9* 23.5*   *  --  102* 118*     CMP:     Recent Labs  Lab 11/03/17  0425 11/03/17  0426 11/04/17  0505 11/04/17  0506   GLU 74  --  103  --    *  --  137  --    K 4.1  --  4.5  --      --  101  --    CO2 20*  --  26  --    BUN 27*  --  23*  --    CREATININE 3.9*  --  3.5*  --    CALCIUM 8.3*  --  8.5*  --    MG  --  1.9  --  1.8   PROT 4.5*  --  4.8*  --    ALBUMIN 2.2*  --  2.3*  --    BILITOT  2.4*  --  2.6*  --    ALKPHOS 216*  --  242*  --    AST 16  --  18  --    ALT 8*  --  9*  --    ANIONGAP 11  --  10  --    EGFRNONAA 19.7*  --  22.4*  --        Significant Imaging: MRI brain without contrast: small area of diffusion restriction within the missy. Otherwise no acute abnormalities.

## 2017-11-04 NOTE — PLAN OF CARE
Problem: Patient Care Overview  Goal: Plan of Care Review  Outcome: Ongoing (interventions implemented as appropriate)  * Patient remains free from falls.   * Seizure precautions maintained.   * Bed at lowest position and locked, call light within reach, non-slip socks on, mother at bedside, and side rails up x 2.  Encouraged patient to call for assistance when needed patient stated understanding.  * Edema noted to the bilateral lower extremities +3 pitting edema.  * Generalized ecchymosis noted   * Renal diet tolerating well.   * Abdomen distended tender/soft  * Chevron incision ANDRZEJ with staples no signs or symptoms of infection noted.  Incision painted with betadine by mother.   * RCW perm-a-cath (10/30/17) patent, dressing clean dry and intact no signs or symptoms of infection noted.  * Left FA PIV 22 G (10/31/17) patent, dressing clean dry and intact no signs or symptoms of infection noted.  * Slight jaundice noted   * Oxygen saturation 98-93 % on room air.  Respirations even and unlabored no signs or symptoms of distress noted.   * Patient has complaints of pain to the abdomen PRN pain medication administered.   * Continuous pulse oximetry maintained see flow sheet for readings.  * Skin assessment preformed no breakdown noted   * Standard precautions maintained  * Continuous telemetry monitoring continued SR to ST    * Patient able to turn self no assistance needed.  * Patient voiding per urinal clear light carlos urine noted see flow sheet for intake and output totals.

## 2017-11-04 NOTE — SUBJECTIVE & OBJECTIVE
Scheduled Meds:   atovaquone  1,500 mg Oral Daily    bisacodyl  10 mg Oral Daily    docusate sodium  100 mg Oral TID PC    epoetin maurisio  5,000 Units Intravenous Every Mon, Wed, Fri    ergocalciferol  50,000 Units Oral Q7 Days    famotidine  20 mg Oral QHS    fluconazole  200 mg Oral Daily    heparin (porcine)  5,000 Units Subcutaneous Q8H    levETIRAcetam  500 mg Oral BID    multivitamin  1 tablet Oral Daily    mycophenolate  1,000 mg Oral BID    polyethylene glycol  17 g Oral Daily    predniSONE  15 mg Oral Daily    sodium bicarbonate  1,300 mg Oral BID    thiamine  100 mg Oral Daily    valganciclovir 50 mg/ml  100 mg Oral Every Mon, Wed, Fri     Continuous Infusions:     PRN Meds:sodium chloride, sodium chloride, sodium chloride 0.9%, bisacodyl, dextrose 50%, dextrose 50%, glucagon (human recombinant), glucose, glucose, heparin (porcine), insulin aspart, ondansetron, oxyCODONE-acetaminophen, oxyCODONE-acetaminophen, simethicone    Review of Systems   Constitutional: Positive for activity change (decreased), appetite change (improving) and fatigue. Negative for chills, diaphoresis and fever.   HENT: Negative for congestion, mouth sores and trouble swallowing.    Eyes: Negative for visual disturbance.   Respiratory: Negative for cough, choking, shortness of breath and wheezing.    Cardiovascular: Negative for chest pain, palpitations and leg swelling.   Gastrointestinal: Positive for abdominal distention and abdominal pain. Negative for constipation, diarrhea, nausea and vomiting.   Endocrine: Negative.    Genitourinary: Negative for decreased urine volume, difficulty urinating, dysuria, hematuria and urgency.   Musculoskeletal: Negative for arthralgias, back pain and joint swelling.   Skin: Positive for wound. Negative for color change, pallor and rash.   Allergic/Immunologic: Positive for immunocompromised state.   Neurological: Positive for weakness. Negative for dizziness, tremors, seizures,  syncope and headaches.   Hematological: Bruises/bleeds easily.   Psychiatric/Behavioral: Positive for sleep disturbance. Negative for agitation, confusion, decreased concentration, dysphoric mood, hallucinations and suicidal ideas. The patient is not nervous/anxious.    All other systems reviewed and are negative.    Objective:     Vital Signs (Most Recent):  Temp: 98.7 °F (37.1 °C) (11/04/17 0745)  Pulse: 89 (11/04/17 0745)  Resp: 18 (11/04/17 0745)  BP: 124/74 (11/04/17 0745)  SpO2: 98 % (11/04/17 0745) Vital Signs (24h Range):  Temp:  [98.6 °F (37 °C)-100.4 °F (38 °C)] 98.7 °F (37.1 °C)  Pulse:  [] 89  Resp:  [16-20] 18  SpO2:  [95 %-100 %] 98 %  BP: (108-149)/(56-82) 124/74     Weight: 78.1 kg (172 lb 2.9 oz)  Body mass index is 26.97 kg/m².    Intake/Output - Last 3 Shifts       11/02 0700 - 11/03 0659 11/03 0700 - 11/04 0659 11/04 0700 - 11/05 0659    P.O. 810 480     I.V. (mL/kg) 1130.8 (15.2)      Blood  350     Other  950     IV Piggyback 50      Total Intake(mL/kg) 1990.8 (26.8) 1780 (22.8)     Urine (mL/kg/hr) 25 (0) 220 (0.1)     Emesis/NG output  0 (0)     Other  2950 (1.6)     Stool 0 (0) 0 (0)     Blood  0 (0)     Total Output 25 3170      Net +1965.8 -1390             Urine Occurrence  0 x     Stool Occurrence 0 x 0 x     Emesis Occurrence  0 x           Physical Exam   Constitutional: He is oriented to person, place, and time. He appears well-developed. No distress.   Temporal and distal extremity muscle wasting   HENT:   Head: Normocephalic and atraumatic.   Mouth/Throat: No oropharyngeal exudate.   Eyes: EOM are normal. Pupils are equal, round, and reactive to light. Scleral icterus is present.   Neck: Normal range of motion. Neck supple.   Cardiovascular: Normal rate, regular rhythm and normal heart sounds.  Exam reveals no gallop and no friction rub.    No murmur heard.  Pulmonary/Chest: Effort normal. No respiratory distress. He has no rales.   Dec R base   Abdominal: Soft. Bowel sounds  are normal. He exhibits distension. There is no tenderness.   Cheon inc with wound vac to R lateral side x 7 cm  Serosang output   Musculoskeletal: Normal range of motion. He exhibits edema (abdominal edema >right lower quadrant).   Neurological: He is alert and oriented to person, place, and time.   Skin: Skin is warm and dry. Capillary refill takes 2 to 3 seconds. He is not diaphoretic.   jaundiced   Psychiatric: He has a normal mood and affect. His behavior is normal. Judgment and thought content normal.   Nursing note and vitals reviewed.    Laboratory:  Immunosuppressants         Stop Route Frequency     mycophenolate capsule 1,000 mg      -- Oral 2 times daily        CBC:     Recent Labs  Lab 11/04/17  0505   WBC 6.47   RBC 2.58*   HGB 7.8*   HCT 23.5*   *   MCV 91   MCH 30.2   MCHC 33.2     CMP:     Recent Labs  Lab 11/04/17  0505      CALCIUM 8.5*   ALBUMIN 2.3*   PROT 4.8*      K 4.5   CO2 26      BUN 23*   CREATININE 3.5*   ALKPHOS 242*   ALT 9*   AST 18   BILITOT 2.6*     Tacrolimus Lvl   Date Value Ref Range Status   11/04/2017 2.5 (L) 5.0 - 15.0 ng/mL Final     Comment:     Testing performed by Liquid Chromatography-Tandem  Mass Spectrometry (LC-MS/MS).  This test was developed and its performance characteristics  determined by [facility], Department of Pathology  and Laboratory Medicine in a manner consistent with CLIA  requirements. It has not been cleared or approved by the [place]  Food and Drug Administration.  This test is used for clinical   purposes.  It should not be regarded as investigational or for  research.     11/03/2017 5.0 5.0 - 15.0 ng/mL Final     Comment:     Testing performed by Liquid Chromatography-Tandem  Mass Spectrometry (LC-MS/MS).  This test was developed and its performance characteristics  determined by [facility], Department of Pathology  and Laboratory Medicine in a manner consistent with CLIA  requirements. It has not been  cleared or approved by the [place]  Food and Drug Administration.  This test is used for clinical   purposes.  It should not be regarded as investigational or for  research.     11/02/2017 6.1 5.0 - 15.0 ng/mL Final     Comment:     Testing performed by Liquid Chromatography-Tandem  Mass Spectrometry (LC-MS/MS).  This test was developed and its performance characteristics  determined by [facility], Department of Pathology  and Laboratory Medicine in a manner consistent with CLIA  requirements. It has not been cleared or approved by the [place]  Food and Drug Administration.  This test is used for clinical   purposes.  It should not be regarded as investigational or for  research.     10/31/2017 7.9 5.0 - 15.0 ng/mL Final     Comment:     Testing performed by Liquid Chromatography-Tandem  Mass Spectrometry (LC-MS/MS).  This test was developed and its performance characteristics  determined by [facility], Department of Pathology  and Laboratory Medicine in a manner consistent with CLIA  requirements. It has not been cleared or approved by the [place]  Food and Drug Administration.  This test is used for clinical   purposes.  It should not be regarded as investigational or for  research.         Labs within the past 24 hours have been reviewed.    Diagnostic Results:  I have personally reviewed all pertinent imaging studies.

## 2017-11-05 LAB
ALBUMIN SERPL BCP-MCNC: 2.2 G/DL
ALP SERPL-CCNC: 239 U/L
ALT SERPL W/O P-5'-P-CCNC: 9 U/L
ANION GAP SERPL CALC-SCNC: 13 MMOL/L
AST SERPL-CCNC: 15 U/L
BASOPHILS # BLD AUTO: 0.05 K/UL
BASOPHILS NFR BLD: 0.7 %
BILIRUB SERPL-MCNC: 2.3 MG/DL
BUN SERPL-MCNC: 34 MG/DL
CALCIUM SERPL-MCNC: 8.7 MG/DL
CHLORIDE SERPL-SCNC: 101 MMOL/L
CO2 SERPL-SCNC: 23 MMOL/L
CREAT SERPL-MCNC: 4.7 MG/DL
DIFFERENTIAL METHOD: ABNORMAL
EOSINOPHIL # BLD AUTO: 0 K/UL
EOSINOPHIL NFR BLD: 0.1 %
ERYTHROCYTE [DISTWIDTH] IN BLOOD BY AUTOMATED COUNT: 19.9 %
EST. GFR  (AFRICAN AMERICAN): 18.2 ML/MIN/1.73 M^2
EST. GFR  (NON AFRICAN AMERICAN): 15.7 ML/MIN/1.73 M^2
GLUCOSE SERPL-MCNC: 83 MG/DL
HCT VFR BLD AUTO: 26.1 %
HGB BLD-MCNC: 8.9 G/DL
IMM GRANULOCYTES # BLD AUTO: 0.04 K/UL
IMM GRANULOCYTES NFR BLD AUTO: 0.6 %
LYMPHOCYTES # BLD AUTO: 0.9 K/UL
LYMPHOCYTES NFR BLD: 12.8 %
MAGNESIUM SERPL-MCNC: 1.8 MG/DL
MCH RBC QN AUTO: 30.7 PG
MCHC RBC AUTO-ENTMCNC: 34.1 G/DL
MCV RBC AUTO: 90 FL
MONOCYTES # BLD AUTO: 0.3 K/UL
MONOCYTES NFR BLD: 4.8 %
NEUTROPHILS # BLD AUTO: 5.8 K/UL
NEUTROPHILS NFR BLD: 81 %
NRBC BLD-RTO: 0 /100 WBC
PHOSPHATE SERPL-MCNC: 4.1 MG/DL
PLATELET # BLD AUTO: 123 K/UL
PMV BLD AUTO: 10.4 FL
POTASSIUM SERPL-SCNC: 3.6 MMOL/L
PROT SERPL-MCNC: 4.7 G/DL
RBC # BLD AUTO: 2.9 M/UL
SODIUM SERPL-SCNC: 137 MMOL/L
TACROLIMUS BLD-MCNC: <1.5 NG/ML
VIT B2 SERPL-MCNC: 3 MCG/L (ref 1–19)
WBC # BLD AUTO: 7.12 K/UL

## 2017-11-05 PROCEDURE — 85025 COMPLETE CBC W/AUTO DIFF WBC: CPT

## 2017-11-05 PROCEDURE — 36415 COLL VENOUS BLD VENIPUNCTURE: CPT

## 2017-11-05 PROCEDURE — 25000003 PHARM REV CODE 250: Performed by: STUDENT IN AN ORGANIZED HEALTH CARE EDUCATION/TRAINING PROGRAM

## 2017-11-05 PROCEDURE — 63600175 PHARM REV CODE 636 W HCPCS: Performed by: PHYSICIAN ASSISTANT

## 2017-11-05 PROCEDURE — 63600175 PHARM REV CODE 636 W HCPCS: Performed by: NURSE PRACTITIONER

## 2017-11-05 PROCEDURE — 25000003 PHARM REV CODE 250: Performed by: NURSE PRACTITIONER

## 2017-11-05 PROCEDURE — 63600175 PHARM REV CODE 636 W HCPCS: Performed by: STUDENT IN AN ORGANIZED HEALTH CARE EDUCATION/TRAINING PROGRAM

## 2017-11-05 PROCEDURE — 25000003 PHARM REV CODE 250: Performed by: PHYSICIAN ASSISTANT

## 2017-11-05 PROCEDURE — 80197 ASSAY OF TACROLIMUS: CPT

## 2017-11-05 PROCEDURE — 83735 ASSAY OF MAGNESIUM: CPT

## 2017-11-05 PROCEDURE — 80053 COMPREHEN METABOLIC PANEL: CPT

## 2017-11-05 PROCEDURE — P9047 ALBUMIN (HUMAN), 25%, 50ML: HCPCS | Performed by: NURSE PRACTITIONER

## 2017-11-05 PROCEDURE — 20600001 HC STEP DOWN PRIVATE ROOM

## 2017-11-05 PROCEDURE — 97110 THERAPEUTIC EXERCISES: CPT

## 2017-11-05 PROCEDURE — 99233 SBSQ HOSP IP/OBS HIGH 50: CPT | Mod: 24,,, | Performed by: NURSE PRACTITIONER

## 2017-11-05 PROCEDURE — 84100 ASSAY OF PHOSPHORUS: CPT

## 2017-11-05 PROCEDURE — 25000003 PHARM REV CODE 250: Performed by: TRANSPLANT SURGERY

## 2017-11-05 PROCEDURE — 97116 GAIT TRAINING THERAPY: CPT

## 2017-11-05 RX ORDER — SODIUM CHLORIDE 9 MG/ML
INJECTION, SOLUTION INTRAVENOUS ONCE
Status: COMPLETED | OUTPATIENT
Start: 2017-11-05 | End: 2017-11-06

## 2017-11-05 RX ORDER — PREDNISONE 2.5 MG/1
2.5 TABLET ORAL DAILY
Status: DISCONTINUED | OUTPATIENT
Start: 2017-11-25 | End: 2017-11-08 | Stop reason: HOSPADM

## 2017-11-05 RX ORDER — ALBUMIN HUMAN 250 G/1000ML
25 SOLUTION INTRAVENOUS EVERY 6 HOURS
Status: COMPLETED | OUTPATIENT
Start: 2017-11-05 | End: 2017-11-05

## 2017-11-05 RX ORDER — PREDNISONE 5 MG/1
5 TABLET ORAL DAILY
Status: DISCONTINUED | OUTPATIENT
Start: 2017-11-18 | End: 2017-11-08 | Stop reason: HOSPADM

## 2017-11-05 RX ORDER — PREDNISONE 10 MG/1
10 TABLET ORAL DAILY
Status: DISCONTINUED | OUTPATIENT
Start: 2017-11-11 | End: 2017-11-08 | Stop reason: HOSPADM

## 2017-11-05 RX ORDER — SODIUM CHLORIDE 9 MG/ML
INJECTION, SOLUTION INTRAVENOUS
Status: DISCONTINUED | OUTPATIENT
Start: 2017-11-05 | End: 2017-11-07

## 2017-11-05 RX ADMIN — FAMOTIDINE 20 MG: 20 TABLET, FILM COATED ORAL at 08:11

## 2017-11-05 RX ADMIN — HEPARIN SODIUM 5000 UNITS: 5000 INJECTION, SOLUTION INTRAVENOUS; SUBCUTANEOUS at 02:11

## 2017-11-05 RX ADMIN — CYCLOSPORINE 300 MG: 100 CAPSULE, LIQUID FILLED ORAL at 05:11

## 2017-11-05 RX ADMIN — SODIUM BICARBONATE 650 MG TABLET 1300 MG: at 08:11

## 2017-11-05 RX ADMIN — HEPARIN SODIUM 5000 UNITS: 5000 INJECTION, SOLUTION INTRAVENOUS; SUBCUTANEOUS at 04:11

## 2017-11-05 RX ADMIN — MYCOPHENOLATE MOFETIL 1000 MG: 250 CAPSULE ORAL at 09:11

## 2017-11-05 RX ADMIN — THERA TABS 1 TABLET: TAB at 08:11

## 2017-11-05 RX ADMIN — ALBUMIN (HUMAN) 25 G: 12.5 SOLUTION INTRAVENOUS at 11:11

## 2017-11-05 RX ADMIN — ALBUMIN (HUMAN) 25 G: 12.5 SOLUTION INTRAVENOUS at 05:11

## 2017-11-05 RX ADMIN — FLUCONAZOLE 200 MG: 200 TABLET ORAL at 08:11

## 2017-11-05 RX ADMIN — ATOVAQUONE 1500 MG: 750 SUSPENSION ORAL at 08:11

## 2017-11-05 RX ADMIN — LEVETIRACETAM 500 MG: 500 TABLET ORAL at 08:11

## 2017-11-05 RX ADMIN — CYCLOSPORINE 300 MG: 100 CAPSULE, LIQUID FILLED ORAL at 12:11

## 2017-11-05 RX ADMIN — DOCUSATE SODIUM 100 MG: 100 CAPSULE, LIQUID FILLED ORAL at 08:11

## 2017-11-05 RX ADMIN — OXYCODONE AND ACETAMINOPHEN 1 TABLET: 10; 325 TABLET ORAL at 11:11

## 2017-11-05 RX ADMIN — ALBUMIN (HUMAN) 25 G: 12.5 SOLUTION INTRAVENOUS at 12:11

## 2017-11-05 RX ADMIN — OXYCODONE AND ACETAMINOPHEN 1 TABLET: 10; 325 TABLET ORAL at 03:11

## 2017-11-05 RX ADMIN — THIAMINE HCL (VITAMIN B1) 50 MG TABLET 100 MG: at 08:11

## 2017-11-05 RX ADMIN — HEPARIN SODIUM 5000 UNITS: 5000 INJECTION, SOLUTION INTRAVENOUS; SUBCUTANEOUS at 09:11

## 2017-11-05 RX ADMIN — PREDNISONE 15 MG: 5 TABLET ORAL at 08:11

## 2017-11-05 RX ADMIN — MYCOPHENOLATE MOFETIL 1000 MG: 250 CAPSULE ORAL at 08:11

## 2017-11-05 RX ADMIN — OXYCODONE AND ACETAMINOPHEN 1 TABLET: 10; 325 TABLET ORAL at 04:11

## 2017-11-05 RX ADMIN — DOCUSATE SODIUM 100 MG: 100 CAPSULE, LIQUID FILLED ORAL at 02:11

## 2017-11-05 RX ADMIN — OXYCODONE AND ACETAMINOPHEN 1 TABLET: 10; 325 TABLET ORAL at 08:11

## 2017-11-05 NOTE — NURSING
"NS  500 cc NS bag spiked for the veraflow wound vac. Patient stated he felt "wet" at that time. Leaking to the right sided veraflow wound vac dressing.  Serous drainage noted from far right mid transparent dressing with pooling noted to right side (during dwelling time) transparent dressing  reinforced with wound vac tegaderm. No leaking from right side mid dressing. Wound vac suction noted and pooling resolved. Small leak noted to far right dressing so additional reinforcement applied to right edge of dressing.  Patient tolerated well. Will monitor leaking.  "

## 2017-11-05 NOTE — ASSESSMENT & PLAN NOTE
- Seizure 11/2/17 at 10am and 330pm.  Ativan NOT needed.  Seizures last 10-15 sec, started w stuttering same syllable, followed by eyes rolling back and full body convulsing.  Pt had postictal symptoms, drowsiness, tearful and sleepy for about 45 minutes. Prograf discontinued 11/2.  Neuro consulted- recommended Keppra load w 1gm IV f/b 500 mg PO bid.  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  The clinical presentation of seizures is also atypical for acute infarction.  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    - EEG 11/3/17- This likely represents a toxic metabolic encephalopathy.  There is no evidence of an epileptic process on this recording.  No seizures were recorded during this study.   - Apprec Neuro recs.  Need to get recs for when to repeat imaging.

## 2017-11-05 NOTE — ASSESSMENT & PLAN NOTE
- PRBC x 2 u 10/26 and responded nicely  - h/h has trended down.  HGb this am 7.1.  Old clot removed from chevron, bleeding subsided w pressure.  Transfused 1 u PRBC w appropriate response- repeat hgb 9.2.    - plan to start Procrit MWF w HD.    -transfused 1 unit prbc 11/4 with good response

## 2017-11-05 NOTE — NURSING
Notified COLLEEN Mcguire DNP of drainage bag placement over patient's old IRISH site which had copious serous drainage. Patient stated he did not want it removed.  Per MATILDE Mcguire ok for drainage bag to remain.

## 2017-11-05 NOTE — PROGRESS NOTES
[facility]  Liver Transplant  Progress Note    Patient Name: Jhonny Diana  MRN: [id number]  Admission Date: 2017  Hospital Length of Stay: 39 days  Code Status: Full Code  Primary Care Provider: Provider Notinsystem  Post-Operative Day: 17    ORGAN:   LIVER  Disease Etiology: Acute Alcoholic Hepatitis  Donor Type:    - Brain Death  CDC High Risk:   No  Donor CMV Status:   Donor CMV Status: Positive  Donor HBcAB:   Negative  Donor HCV Status:   Negative  Whole or Partial: Whole Liver  Biliary Anastomosis: End to End  Arterial Anatomy: Standard  Subjective:     History of Present Illness:  Jhonny Diana is a 26 yo male with hx of childhood asthma and heavy alcohol abuse (fifth of liquor daily since teenager, last drink 2.5 wks ago per mother) who was [place] [facility] [place] higher level of care/liver transplant evaluation. Patient initially presented to OSH for a 2 mo hx of progressively worsening abdominal pain with associated fatigue, jaundice, and abdominal distention with ~20# weight gain.  Reports family hx of alcoholic cirrhosis in father. Pt was recently diagnosed with alcoholic hepatitis during his admission at OSH on -9/15. During that hospitalization, patient was given ceftriaxone for SBP ppx, prednisone for alcoholic hepatitis, and diuresed. Pt was discharged and instructed to follow up with PCP and AA within 1 wk. After his discharge, patient returned to ED 4 days after for increased weight gain and worsening abdominal pain/jaundice.     Mr. Diana was admitted to the [facility] on  for decompensated alcoholic hepatitis with high MELD score, severe HE, and LAURA. Hepatology consulted and followed to evaluate liver transplant candidacy. Pt started on albumin and lasix for LAURA and possibility for HRS. However, nephrology felt that LAURA is likely multifactorial: ATN given hypotensive episodes at OSH and toxic given severe hyperbilirubinemia; not truly HRS. Lactulose/rifaximin started  for HE and mental status slowly improved. Mr. Diana with significant ABD pain since admission with ABD distension. CT abdomen was significant for pericolonic fat stranding of right colon; no obstruction. Also with diffuse mesenteric edema. No significant ascites noted, no cholecystitis/cholelithiasis/ductal dilation. US liver with doppler showed appropriate vasculature.  He was stepped down to Hospital Medicine on 10/5.  Paracentesis was done on 10/9 with 3.3L removed and 10/12 with 1.8 removed, no infection. His diet was advanced and Dietary was consulted to help get adequate caloric intake. Listed for liver transplant 10/17 with MELD 36.  Pt received organ offer and underwent liver txp 10/19 which took place without complication.  Steroid induction used, CMV +/+.  Post operatively, LFTs/enzymes trended down nicely.  POD 5 U/S with elevated RIs and sluggish flow in HA system, otherwise ok.  Pt with LAURA prior to and s/p txp requiring dialysis.  He is tolerating HD with last HD 10/30.      Hospital Course:  Interval hx:  No seizure overnight.  Last seizure 11/2/17 at 330pm.  Ativan NOT needed.  After second seizure, loaded keppra 1gm IV and started Keppra 500mg PO bid.  Tolerated HD 11/3 per permcath.  Blood cx sent 11/2 after first seizure- prelim NGTD.  Prograf remains off.  MRI 11/2/17, results reviewed.  EEG results - likely represents a toxic metabolic encephalopathy.  There is no evidence of an epileptic process on this recording.  No seizures were recorded during this study .  Neuro following.  Stroke team has signed off.  Chevron opened additional 8 cm, staples removed.  Placed vera flow wd vac(originally opened 10/27, vac placed 10/29).  H/h lower 11/4, transfused 1 unit prbc with good response.  Pt's mom reported intermittent delirium/emotional lability.  Psych was consulted no plan for medications at this time.  Low grade fever over night - monitor closely.    2D echo r/o emboli negative embolus showed  small pericardial effusion, PA pressure 33.  Pt is on HD, s/w cardiology on call.  Cont to michelle, cont tele and monitor.    Scheduled Meds:   albumin human 25%  25 g Intravenous Q6H    atovaquone  1,500 mg Oral Daily    bisacodyl  10 mg Oral Daily    docusate sodium  100 mg Oral TID PC    epoetin maurisio  5,000 Units Intravenous Every Mon, Wed, Fri    ergocalciferol  50,000 Units Oral Q7 Days    famotidine  20 mg Oral QHS    fluconazole  200 mg Oral Daily    heparin (porcine)  5,000 Units Subcutaneous Q8H    levETIRAcetam  500 mg Oral BID    multivitamin  1 tablet Oral Daily    mycophenolate  1,000 mg Oral BID    polyethylene glycol  17 g Oral Daily    [START ON 11/11/2017] predniSONE  10 mg Oral Daily    Followed by    [START ON 11/18/2017] predniSONE  5 mg Oral Daily    Followed by    [START ON 11/25/2017] predniSONE  2.5 mg Oral Daily    predniSONE  15 mg Oral Daily    sodium bicarbonate  1,300 mg Oral BID    thiamine  100 mg Oral Daily    valganciclovir 50 mg/ml  100 mg Oral Every Mon, Wed, Fri     Continuous Infusions:     PRN Meds:sodium chloride, sodium chloride, sodium chloride 0.9%, bisacodyl, dextrose 50%, dextrose 50%, glucagon (human recombinant), glucose, glucose, heparin (porcine), insulin aspart, ondansetron, oxyCODONE-acetaminophen, oxyCODONE-acetaminophen, simethicone    Review of Systems   Constitutional: Positive for activity change (decreased), appetite change (improving) and fatigue. Negative for chills, diaphoresis and fever.   HENT: Negative for congestion, mouth sores and trouble swallowing.    Eyes: Negative for visual disturbance.   Respiratory: Negative for cough, choking, shortness of breath and wheezing.    Cardiovascular: Negative for chest pain, palpitations and leg swelling.   Gastrointestinal: Positive for abdominal distention and abdominal pain. Negative for constipation, diarrhea, nausea and vomiting.   Endocrine: Negative.    Genitourinary: Negative for  decreased urine volume, difficulty urinating, dysuria, hematuria and urgency.   Musculoskeletal: Negative for arthralgias, back pain and joint swelling.   Skin: Positive for wound. Negative for color change, pallor and rash.   Allergic/Immunologic: Positive for immunocompromised state.   Neurological: Positive for weakness. Negative for dizziness, tremors, seizures, syncope and headaches.   Hematological: Bruises/bleeds easily.   Psychiatric/Behavioral: Positive for sleep disturbance. Negative for agitation, confusion, decreased concentration, dysphoric mood, hallucinations and suicidal ideas. The patient is not nervous/anxious.    All other systems reviewed and are negative.    Objective:     Vital Signs (Most Recent):  Temp: 99.5 °F (37.5 °C) (11/05/17 0700)  Pulse: 95 (11/05/17 0743)  Resp: 15 (11/05/17 0356)  BP: 122/75 (11/05/17 0700)  SpO2: 96 % (11/05/17 0700) Vital Signs (24h Range):  Temp:  [98.4 °F (36.9 °C)-99.5 °F (37.5 °C)] 99.5 °F (37.5 °C)  Pulse:  [83-98] 95  Resp:  [15-20] 15  SpO2:  [96 %-100 %] 96 %  BP: (114-127)/(58-75) 122/75     Weight: 78.1 kg (172 lb 2.9 oz)  Body mass index is 26.97 kg/m².    Intake/Output - Last 3 Shifts       11/03 0700 - 11/04 0659 11/04 0700 - 11/05 0659 11/05 0700 - 11/06 0659    P.O. 480 440 240    I.V. (mL/kg)       Blood 350 350     Other 950      IV Piggyback       Total Intake(mL/kg) 1780 (22.8) 790 (10.1) 240 (3.1)    Urine (mL/kg/hr) 220 (0.1) 75 (0)     Emesis/NG output 0 (0)      Other 2950 (1.6) 750 (0.4)     Stool 0 (0) 0 (0)     Blood 0 (0)      Total Output 3170 825      Net -1390 -35 +240           Urine Occurrence 0 x      Stool Occurrence 0 x 0 x     Emesis Occurrence 0 x            Physical Exam   Constitutional: He is oriented to person, place, and time. He appears well-developed. No distress.   Temporal and distal extremity muscle wasting   HENT:   Head: Normocephalic and atraumatic.   Mouth/Throat: No oropharyngeal exudate.   Eyes: EOM are normal.  Pupils are equal, round, and reactive to light. Scleral icterus is present.   Neck: Normal range of motion. Neck supple.   Cardiovascular: Normal rate, regular rhythm, normal heart sounds and intact distal pulses.  Exam reveals no gallop and no friction rub.    No murmur heard.  Pulmonary/Chest: Effort normal. No respiratory distress. He has no rales.   Dec R base   Abdominal: Soft. Bowel sounds are normal. He exhibits distension. There is no tenderness.       Musculoskeletal: Normal range of motion. He exhibits edema (abdominal edema >right lower quadrant).   Neurological: He is alert and oriented to person, place, and time.   Skin: Skin is warm and dry. Capillary refill takes 2 to 3 seconds. He is not diaphoretic.   jaundiced   Psychiatric: He has a normal mood and affect. His behavior is normal. Judgment and thought content normal.   Nursing note and vitals reviewed.    Laboratory:  Immunosuppressants         Stop Route Frequency     mycophenolate capsule 1,000 mg      -- Oral 2 times daily        CBC:     Recent Labs  Lab 11/05/17  0540   WBC 7.12   RBC 2.90*   HGB 8.9*   HCT 26.1*   *   MCV 90   MCH 30.7   MCHC 34.1     CMP:     Recent Labs  Lab 11/05/17  0540   GLU 83   CALCIUM 8.7   ALBUMIN 2.2*   PROT 4.7*      K 3.6   CO2 23      BUN 34*   CREATININE 4.7*   ALKPHOS 239*   ALT 9*   AST 15   BILITOT 2.3*     Tacrolimus Lvl   Date Value Ref Range Status   11/04/2017 2.5 (L) 5.0 - 15.0 ng/mL Final     Comment:     Testing performed by Liquid Chromatography-Tandem  Mass Spectrometry (LC-MS/MS).  This test was developed and its performance characteristics  determined by [facility], Department of Pathology  and Laboratory Medicine in a manner consistent with CLIA  requirements. It has not been cleared or approved by the [place]  Food and Drug Administration.  This test is used for clinical   purposes.  It should not be regarded as investigational or for  research.     11/03/2017 5.0 5.0 -  15.0 ng/mL Final     Comment:     Testing performed by Liquid Chromatography-Tandem  Mass Spectrometry (LC-MS/MS).  This test was developed and its performance characteristics  determined by [facility], Department of Pathology  and Laboratory Medicine in a manner consistent with CLIA  requirements. It has not been cleared or approved by the [place]  Food and Drug Administration.  This test is used for clinical   purposes.  It should not be regarded as investigational or for  research.     11/02/2017 6.1 5.0 - 15.0 ng/mL Final     Comment:     Testing performed by Liquid Chromatography-Tandem  Mass Spectrometry (LC-MS/MS).  This test was developed and its performance characteristics  determined by [facility], Department of Pathology  and Laboratory Medicine in a manner consistent with CLIA  requirements. It has not been cleared or approved by the [place]  Food and Drug Administration.  This test is used for clinical   purposes.  It should not be regarded as investigational or for  research.     10/31/2017 7.9 5.0 - 15.0 ng/mL Final     Comment:     Testing performed by Liquid Chromatography-Tandem  Mass Spectrometry (LC-MS/MS).  This test was developed and its performance characteristics  determined by [facility], Department of Pathology  and Laboratory Medicine in a manner consistent with CLIA  requirements. It has not been cleared or approved by the [place]  Food and Drug Administration.  This test is used for clinical   purposes.  It should not be regarded as investigational or for  research.         Labs within the past 24 hours have been reviewed.    Diagnostic Results:  I have personally reviewed all pertinent imaging studies.    Assessment/Plan:     * Liver replaced by transplant    - LFTs/enzymes trended down nicely.  Alk phos elevated POD #14- monitor closely.   - Cont bowel regimen.  - Transitioned pain control to scheduled acetaminophen + Tramadol as hallucinations with oxycodone-  tolerating well.  - Liver US POD 5 with elevated RIs and slowed flow HA system.  US 10/23 technically difficult exam w difficulty visualizing arterial system and slow flow w no tardus parvus.  Repeat liver US POD# 13 showed continued sluggish flow in HA system.    - ASA started 11/2/17.  - Wound vac placed 10/29. Wd opened additional 8 cm 11/3-  veraflow wd vac placed afterwards.          Seizure    - Seizure 11/2/17 at 10am and 330pm.  Ativan NOT needed.  Seizures last 10-15 sec, started w stuttering same syllable, followed by eyes rolling back and full body convulsing.  Pt had postictal symptoms, drowsiness, tearful and sleepy for about 45 minutes. Prograf discontinued 11/2.  Neuro consulted- recommended Keppra load w 1gm IV f/b 500 mg PO bid.  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  The clinical presentation of seizures is also atypical for acute infarction.  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    - EEG 11/3/17- This likely represents a toxic metabolic encephalopathy.  There is no evidence of an epileptic process on this recording.  No seizures were recorded during this study.   - Apprec Neuro recs.  Need to get recs for when to repeat imaging.                LAURA (acute kidney injury)    - Nephrology following.  Apprec recs.  - Tolerated HD 11/03/17- tolerated well.  - Permcath placed 10/30/17  - Set up for outpt HD  - of note, small pleural effusion, PA pressure 33.  Did NOT consult cardiology officially but s/w them per phone- they recommend cont to diurese, tele and monitor.  Consult as necessary.          Acute blood loss anemia    - PRBC x 2 u 10/26 and responded nicely  - h/h has trended down.  HGb this am 7.1.  Old clot removed from chevron, bleeding subsided w pressure.  Transfused 1 u PRBC w appropriate response- repeat hgb 9.2.    - plan to start Procrit MWF w HD.    -transfused 1 unit prbc 11/4 with good  response        Abnormal finding on MRI of brain    - MRI findings 11/2 following second seizure-  Focus of diffusion restriction in the central missy. The location is somewhat atypical of infarcts from the pontine perforators.  The clinical presentation of seizures is also atypical for acute infarction.  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  Clinical correlation and short-term followup is recommended.  - stroke team consulted.  Not likely stroke.  Has signed off.  Apprec recs.  - Neurology consulted and following.  Apprec recs.  Need recs regarding when to repeat imaging.            Physical deconditioning    - Working with PT/OT  - Rehab consult placed- on hold for seizure activity.          At risk for opportunistic infections    - D/C Dapsone secondary to worsening anemia and continue atovaquone for PCP prophylaxis  - Continue Valcyte for CMV prophylaxis  - Continue fluconazole for fungal prophylaxis          Long-term use of immunosuppressant medication    - See prophylactic immunotherapy          Prophylactic immunotherapy    - Continue prednisone taper, Cellcept.  - Will monitor for signs of toxic side effects, check daily tacrolimus troughs, and change meds accordingly.  - Discontinued Prograf 11/2/17.    -likely start cyclosporin        Severe malnutrition    - Dietary consulted and following.   - Continue protein powder        Anasarca    - 2/2 liver disease, LAURA, and hypoalbuminemia  - Expect improvement once kidneys improve.    - Attempt to keep dressings dry  - cont to diurese per HD.         Alcoholism /alcohol abuse    - Psych consulted, high risk for transplant. WILL NEED to complete ABU program s/p transplant              VTE Risk Mitigation         Ordered     heparin (porcine) injection 5,000 Units  Every 8 hours     Route:  Subcutaneous        10/30/17 0703     heparin (porcine) injection 1,000 Units  As needed (PRN)     Route:  Intra-Catheter        10/30/17 6264     Place  sequential compression device  Until discontinued      10/20/17 0346     High Risk of VTE  Once      10/20/17 0346          The patients clinical status was discussed at multidisplinary rounds, involving transplant surgery, transplant medicine, pharmacy, nursing, nutrition, and social work    Discharge Planning:  Not candidate at this time  Monitor kidney function --> may need outpt HD  Monitor HH needs vs rehab for deconditioned status      Seda Camacho, NP  Liver Transplant  [facility]

## 2017-11-05 NOTE — SUBJECTIVE & OBJECTIVE
Scheduled Meds:   albumin human 25%  25 g Intravenous Q6H    atovaquone  1,500 mg Oral Daily    bisacodyl  10 mg Oral Daily    docusate sodium  100 mg Oral TID PC    epoetin maurisio  5,000 Units Intravenous Every Mon, Wed, Fri    ergocalciferol  50,000 Units Oral Q7 Days    famotidine  20 mg Oral QHS    fluconazole  200 mg Oral Daily    heparin (porcine)  5,000 Units Subcutaneous Q8H    levETIRAcetam  500 mg Oral BID    multivitamin  1 tablet Oral Daily    mycophenolate  1,000 mg Oral BID    polyethylene glycol  17 g Oral Daily    [START ON 11/11/2017] predniSONE  10 mg Oral Daily    Followed by    [START ON 11/18/2017] predniSONE  5 mg Oral Daily    Followed by    [START ON 11/25/2017] predniSONE  2.5 mg Oral Daily    predniSONE  15 mg Oral Daily    sodium bicarbonate  1,300 mg Oral BID    thiamine  100 mg Oral Daily    valganciclovir 50 mg/ml  100 mg Oral Every Mon, Wed, Fri     Continuous Infusions:     PRN Meds:sodium chloride, sodium chloride, sodium chloride 0.9%, bisacodyl, dextrose 50%, dextrose 50%, glucagon (human recombinant), glucose, glucose, heparin (porcine), insulin aspart, ondansetron, oxyCODONE-acetaminophen, oxyCODONE-acetaminophen, simethicone    Review of Systems   Constitutional: Positive for activity change (decreased), appetite change (improving) and fatigue. Negative for chills, diaphoresis and fever.   HENT: Negative for congestion, mouth sores and trouble swallowing.    Eyes: Negative for visual disturbance.   Respiratory: Negative for cough, choking, shortness of breath and wheezing.    Cardiovascular: Negative for chest pain, palpitations and leg swelling.   Gastrointestinal: Positive for abdominal distention and abdominal pain. Negative for constipation, diarrhea, nausea and vomiting.   Endocrine: Negative.    Genitourinary: Negative for decreased urine volume, difficulty urinating, dysuria, hematuria and urgency.   Musculoskeletal: Negative for arthralgias, back pain  and joint swelling.   Skin: Positive for wound. Negative for color change, pallor and rash.   Allergic/Immunologic: Positive for immunocompromised state.   Neurological: Positive for weakness. Negative for dizziness, tremors, seizures, syncope and headaches.   Hematological: Bruises/bleeds easily.   Psychiatric/Behavioral: Positive for sleep disturbance. Negative for agitation, confusion, decreased concentration, dysphoric mood, hallucinations and suicidal ideas. The patient is not nervous/anxious.    All other systems reviewed and are negative.    Objective:     Vital Signs (Most Recent):  Temp: 99.5 °F (37.5 °C) (11/05/17 0700)  Pulse: 95 (11/05/17 0743)  Resp: 15 (11/05/17 0356)  BP: 122/75 (11/05/17 0700)  SpO2: 96 % (11/05/17 0700) Vital Signs (24h Range):  Temp:  [98.4 °F (36.9 °C)-99.5 °F (37.5 °C)] 99.5 °F (37.5 °C)  Pulse:  [83-98] 95  Resp:  [15-20] 15  SpO2:  [96 %-100 %] 96 %  BP: (114-127)/(58-75) 122/75     Weight: 78.1 kg (172 lb 2.9 oz)  Body mass index is 26.97 kg/m².    Intake/Output - Last 3 Shifts       11/03 0700 - 11/04 0659 11/04 0700 - 11/05 0659 11/05 0700 - 11/06 0659    P.O. 480 440 240    I.V. (mL/kg)       Blood 350 350     Other 950      IV Piggyback       Total Intake(mL/kg) 1780 (22.8) 790 (10.1) 240 (3.1)    Urine (mL/kg/hr) 220 (0.1) 75 (0)     Emesis/NG output 0 (0)      Other 2950 (1.6) 750 (0.4)     Stool 0 (0) 0 (0)     Blood 0 (0)      Total Output 3170 825      Net -1390 -35 +240           Urine Occurrence 0 x      Stool Occurrence 0 x 0 x     Emesis Occurrence 0 x            Physical Exam   Constitutional: He is oriented to person, place, and time. He appears well-developed. No distress.   Temporal and distal extremity muscle wasting   HENT:   Head: Normocephalic and atraumatic.   Mouth/Throat: No oropharyngeal exudate.   Eyes: EOM are normal. Pupils are equal, round, and reactive to light. Scleral icterus is present.   Neck: Normal range of motion. Neck supple.    Cardiovascular: Normal rate, regular rhythm, normal heart sounds and intact distal pulses.  Exam reveals no gallop and no friction rub.    No murmur heard.  Pulmonary/Chest: Effort normal. No respiratory distress. He has no rales.   Dec R base   Abdominal: Soft. Bowel sounds are normal. He exhibits distension. There is no tenderness.       Musculoskeletal: Normal range of motion. He exhibits edema (abdominal edema >right lower quadrant).   Neurological: He is alert and oriented to person, place, and time.   Skin: Skin is warm and dry. Capillary refill takes 2 to 3 seconds. He is not diaphoretic.   jaundiced   Psychiatric: He has a normal mood and affect. His behavior is normal. Judgment and thought content normal.   Nursing note and vitals reviewed.    Laboratory:  Immunosuppressants         Stop Route Frequency     mycophenolate capsule 1,000 mg      -- Oral 2 times daily        CBC:     Recent Labs  Lab 11/05/17  0540   WBC 7.12   RBC 2.90*   HGB 8.9*   HCT 26.1*   *   MCV 90   MCH 30.7   MCHC 34.1     CMP:     Recent Labs  Lab 11/05/17  0540   GLU 83   CALCIUM 8.7   ALBUMIN 2.2*   PROT 4.7*      K 3.6   CO2 23      BUN 34*   CREATININE 4.7*   ALKPHOS 239*   ALT 9*   AST 15   BILITOT 2.3*     Tacrolimus Lvl   Date Value Ref Range Status   11/04/2017 2.5 (L) 5.0 - 15.0 ng/mL Final     Comment:     Testing performed by Liquid Chromatography-Tandem  Mass Spectrometry (LC-MS/MS).  This test was developed and its performance characteristics  determined by [facility], Department of Pathology  and Laboratory Medicine in a manner consistent with CLIA  requirements. It has not been cleared or approved by the [place]  Food and Drug Administration.  This test is used for clinical   purposes.  It should not be regarded as investigational or for  research.     11/03/2017 5.0 5.0 - 15.0 ng/mL Final     Comment:     Testing performed by Liquid Chromatography-Tandem  Mass Spectrometry (LC-MS/MS).  This  test was developed and its performance characteristics  determined by [facility], Department of Pathology  and Laboratory Medicine in a manner consistent with CLIA  requirements. It has not been cleared or approved by the [place]  Food and Drug Administration.  This test is used for clinical   purposes.  It should not be regarded as investigational or for  research.     11/02/2017 6.1 5.0 - 15.0 ng/mL Final     Comment:     Testing performed by Liquid Chromatography-Tandem  Mass Spectrometry (LC-MS/MS).  This test was developed and its performance characteristics  determined by [facility], Department of Pathology  and Laboratory Medicine in a manner consistent with CLIA  requirements. It has not been cleared or approved by the [place]  Food and Drug Administration.  This test is used for clinical   purposes.  It should not be regarded as investigational or for  research.     10/31/2017 7.9 5.0 - 15.0 ng/mL Final     Comment:     Testing performed by Liquid Chromatography-Tandem  Mass Spectrometry (LC-MS/MS).  This test was developed and its performance characteristics  determined by [facility], Department of Pathology  and Laboratory Medicine in a manner consistent with CLIA  requirements. It has not been cleared or approved by the [place]  Food and Drug Administration.  This test is used for clinical   purposes.  It should not be regarded as investigational or for  research.         Labs within the past 24 hours have been reviewed.    Diagnostic Results:  I have personally reviewed all pertinent imaging studies.

## 2017-11-05 NOTE — PLAN OF CARE
Problem: Patient Care Overview  Goal: Plan of Care Review  Outcome: Ongoing (interventions implemented as appropriate)  * Patient remains free from falls.   * Seizure precautions maintained.   * Bed at lowest position and locked, call light within reach, non-slip socks on, mother at bedside, and side rails up x 2.  Encouraged patient to call for assistance when needed patient stated understanding. Per patients request patient ambulated in the halls 250-300 feet with walker. Patient tolerated well no respiratory distress noted.   * Edema noted to the bilateral lower extremities +3 pitting edema.  * Generalized ecchymosis noted   * Renal diet tolerating well.   * Abdomen distended tender/soft  * Chevron incision ANDRZEJ with staples no signs or symptoms of infection noted.  Incision painted with betadine by mother.   * RCW perm-a-cath (10/30/17) patent, dressing clean dry and intact no signs or symptoms of infection noted. Dressing changed on day shift next dressing change due to 11/11/17  * Left FA PIV 22 G (10/31/17) patent, dressing clean dry and intact no signs or symptoms of infection noted.  * Slight jaundice noted   * Oxygen saturation 98-93 % on room air.  Respirations even and unlabored no signs or symptoms of distress noted.   * Patient has complaints of pain to the abdomen PRN PO pain medication administered.   * Continuous pulse oximetry discontinued on day shift per MD order.   * Skin assessment preformed no breakdown noted   * Standard precautions maintained  * Continuous telemetry monitoring continued SR to ST    * Patient able to turn self no assistance needed.  * Patient voiding per urinal clear light carlos urine noted see flow sheet for intake and output totals  * Right old IRISH site mepapore applied to protect skin and wound vac dressing. Drainage bag clean dry and intact no signs or symptoms of infection noted. Serous drainage noted see flow sheet for output totals.

## 2017-11-05 NOTE — PT/OT/SLP PROGRESS
"Physical Therapy  Treatment    Jhonny Diana   MRN: [id number]   Admitting Diagnosis: Liver transplanted    PT Received On: 11/05/17  PT Start Time: 1044     PT Stop Time: 1110    PT Total Time (min): 26 min       Billable Minutes:  Gait Training 16 and Therapeutic Exercise 10    Treatment Type: Treatment  PT/PTA: PTA     PTA Visit Number: 1       General Precautions: Standard, fall  Orthopedic Precautions: N/A   Braces:      Do you have any cultural, spiritual, Confucianist conflicts, given your current situation?: none reported     Subjective:  Communicated with NSG prior to session.  Patient states " I am doing a lot better today, I have walked to the bathroom".    Pain/Comfort  Pain Rating 1: 3/10  Location - Orientation 1: generalized  Location 1: abdomen  Pain Addressed 1: Reposition, Pre-medicate for activity  Pain Rating Post-Intervention 1: 3/10    Objective:   Patient found with: wound vac    Functional Mobility:  Bed Mobility:   Scooting/Bridging: Stand by Assistance  Supine to Sit: Supervision    Transfers:  Sit <> Stand Assistance: Minimum Assistance  Sit <> Stand Assistive Device: Rolling Walker  Bed <> Chair Technique: Stand Pivot  Bed <> Chair Assistance: Contact Guard Assistance  Bed <> Chair Assistive Device: Rolling Walker    Gait:   Gait Distance: 130 ft with Wound vac in place.  Assistance 1: Contact Guard Assistance  Gait Assistive Device: Rolling walker  Gait Pattern: reciprocal  Gait Deviation(s): decreased [place], decreased stride length, foot flat    Stairs:      Balance:   Static Sit: GOOD: Takes MODERATE challenges from all directions  Dynamic Sit: GOOD: Maintains balance through MODERATE excursions of active trunk movement  Static Stand: FAIR+: Takes MINIMAL challenges from all directions  Dynamic stand: FAIR: Needs CONTACT GUARD during gait     Therapeutic Activities and Exercises:  Donned a second gown. There ex in sitting: LAQ, HIP FLEX AND HEEL/TOE RAISES 2X15 REPS B LE.     AM-PAC 6 CLICK " MOBILITY  How much help from another person does this patient currently need?   1 = Unable, Total/Dependent Assistance  2 = A lot, Maximum/Moderate Assistance  3 = A little, Minimum/Contact Guard/Supervision  4 = None, Modified [place]/Independent    Turning over in bed (including adjusting bedclothes, sheets and blankets)?: 3  Sitting down on and standing up from a chair with arms (e.g., wheelchair, bedside commode, etc.): 3  Moving from lying on back to sitting on the side of the bed?: 3  Moving to and from a bed to a chair (including a wheelchair)?: 3  Need to walk in hospital room?: 3  Climbing 3-5 steps with a railing?: 2  Total Score: 17    AM-PAC Raw Score CMS G-Code Modifier Level of Impairment Assistance   6 % Total / Unable   7 - 9 CM 80 - 100% Maximal Assist   10 - 14 CL 60 - 80% Moderate Assist   15 - 19 CK 40 - 60% Moderate Assist   20 - 22 CJ 20 - 40% Minimal Assist   23 CI 1-20% SBA / CGA   24 CH 0% Independent/ Mod I     Patient left up in chair with all lines intact, call button in reach and mother present.    Assessment:  Jhonny Diana is a 27 y.o. male with a medical diagnosis of Liver transplanted and presents with decreased endurance and strength. Patient ambulates with flat foot gait pattern due to decreased ankle ROM caused by swelling. Patient showed good participation and no signs of distress or loss of balance during gait training. Patient would benefit from continued P.T. To address deficits.    Rehab identified problem list/impairments: Rehab identified problem list/impairments: weakness, gait instability, impaired balance, decreased lower extremity function, edema    Rehab potential is good.    Activity tolerance: Good    Discharge recommendations: Discharge Facility/Level Of Care Needs: home with home health     Barriers to discharge: Barriers to Discharge: None    Equipment recommendations: Equipment Needed After Discharge: walker, rolling     GOALS:    Physical Therapy  Goals        Problem: Physical Therapy Goal    Goal Priority Disciplines Outcome Goal Variances Interventions   Physical Therapy Goal     PT/OT, PT Ongoing (interventions implemented as appropriate)     Description:  Goals to be met by: 2017    Patient will increase functional independence with mobility by performin. Supine to sit with CGA.   2. Sit to supine with CGA  3. Sit to stand transfer with SBA using LRAD or no Ad.   4. Bed to chair transfer with SBA using LRAD  - GOAL MET  5. Gait x150 feet with SBA using LRAD or no Ad. - GOAL MET  6. Ascend/descend 4 stair with bilateral Handrails with CGA.  7. Lower extremity exercise program x20 reps per handout, with independence.  8.  Pt to perf car transfer simulation with CGA.                                PLAN:    Patient to be seen 5 x/week  to address the above listed problems via gait training, therapeutic activities, therapeutic exercises, neuromuscular re-education  Plan of Care expires: 17  Plan of Care reviewed with: patient, mother         Son Sandhu, PTA  2017

## 2017-11-06 LAB
ALBUMIN SERPL BCP-MCNC: 2.7 G/DL
ALP SERPL-CCNC: 232 U/L
ALT SERPL W/O P-5'-P-CCNC: 8 U/L
ANION GAP SERPL CALC-SCNC: 13 MMOL/L
AST SERPL-CCNC: 12 U/L
BASOPHILS # BLD AUTO: 0.04 K/UL
BASOPHILS NFR BLD: 0.7 %
BILIRUB SERPL-MCNC: 2.3 MG/DL
BUN SERPL-MCNC: 42 MG/DL
CALCIUM SERPL-MCNC: 8.8 MG/DL
CHLORIDE SERPL-SCNC: 100 MMOL/L
CO2 SERPL-SCNC: 23 MMOL/L
CREAT SERPL-MCNC: 5.3 MG/DL
CYCLOSPORINE BLD LC/MS/MS-MCNC: 312 NG/ML
DIFFERENTIAL METHOD: ABNORMAL
EOSINOPHIL # BLD AUTO: 0 K/UL
EOSINOPHIL NFR BLD: 0.2 %
ERYTHROCYTE [DISTWIDTH] IN BLOOD BY AUTOMATED COUNT: 19.3 %
EST. GFR  (AFRICAN AMERICAN): 15.7 ML/MIN/1.73 M^2
EST. GFR  (NON AFRICAN AMERICAN): 13.6 ML/MIN/1.73 M^2
GLUCOSE SERPL-MCNC: 82 MG/DL
HCT VFR BLD AUTO: 25 %
HGB BLD-MCNC: 8.4 G/DL
IMM GRANULOCYTES # BLD AUTO: 0.03 K/UL
IMM GRANULOCYTES NFR BLD AUTO: 0.5 %
LEVETIRACETAM SERPL-MCNC: 22 UG/ML (ref 3–60)
LYMPHOCYTES # BLD AUTO: 1 K/UL
LYMPHOCYTES NFR BLD: 16.8 %
MAGNESIUM SERPL-MCNC: 1.8 MG/DL
MCH RBC QN AUTO: 30.8 PG
MCHC RBC AUTO-ENTMCNC: 33.6 G/DL
MCV RBC AUTO: 92 FL
MONOCYTES # BLD AUTO: 0.3 K/UL
MONOCYTES NFR BLD: 5.8 %
NEUTROPHILS # BLD AUTO: 4.4 K/UL
NEUTROPHILS NFR BLD: 76 %
NRBC BLD-RTO: 0 /100 WBC
PHOSPHATE SERPL-MCNC: 4.6 MG/DL
PLATELET # BLD AUTO: 130 K/UL
PMV BLD AUTO: 10.4 FL
POTASSIUM SERPL-SCNC: 4 MMOL/L
PROT SERPL-MCNC: 4.9 G/DL
RBC # BLD AUTO: 2.73 M/UL
SODIUM SERPL-SCNC: 136 MMOL/L
TACROLIMUS BLD-MCNC: <1.5 NG/ML
WBC # BLD AUTO: 5.73 K/UL

## 2017-11-06 PROCEDURE — 80197 ASSAY OF TACROLIMUS: CPT

## 2017-11-06 PROCEDURE — 85025 COMPLETE CBC W/AUTO DIFF WBC: CPT

## 2017-11-06 PROCEDURE — 63600175 PHARM REV CODE 636 W HCPCS

## 2017-11-06 PROCEDURE — 25000003 PHARM REV CODE 250: Performed by: PHYSICIAN ASSISTANT

## 2017-11-06 PROCEDURE — 20600001 HC STEP DOWN PRIVATE ROOM

## 2017-11-06 PROCEDURE — 36415 COLL VENOUS BLD VENIPUNCTURE: CPT

## 2017-11-06 PROCEDURE — 83735 ASSAY OF MAGNESIUM: CPT

## 2017-11-06 PROCEDURE — 63600175 PHARM REV CODE 636 W HCPCS: Performed by: INTERNAL MEDICINE

## 2017-11-06 PROCEDURE — 25000003 PHARM REV CODE 250: Performed by: INTERNAL MEDICINE

## 2017-11-06 PROCEDURE — 80158 DRUG ASSAY CYCLOSPORINE: CPT

## 2017-11-06 PROCEDURE — 63600175 PHARM REV CODE 636 W HCPCS: Performed by: NURSE PRACTITIONER

## 2017-11-06 PROCEDURE — 80053 COMPREHEN METABOLIC PANEL: CPT

## 2017-11-06 PROCEDURE — 63600175 PHARM REV CODE 636 W HCPCS: Performed by: PHYSICIAN ASSISTANT

## 2017-11-06 PROCEDURE — 96372 THER/PROPH/DIAG INJ SC/IM: CPT

## 2017-11-06 PROCEDURE — 99233 SBSQ HOSP IP/OBS HIGH 50: CPT | Mod: 24,,, | Performed by: INTERNAL MEDICINE

## 2017-11-06 PROCEDURE — 90935 HEMODIALYSIS ONE EVALUATION: CPT

## 2017-11-06 PROCEDURE — 63600175 PHARM REV CODE 636 W HCPCS: Performed by: STUDENT IN AN ORGANIZED HEALTH CARE EDUCATION/TRAINING PROGRAM

## 2017-11-06 PROCEDURE — 25000003 PHARM REV CODE 250: Performed by: NURSE PRACTITIONER

## 2017-11-06 PROCEDURE — 25000003 PHARM REV CODE 250: Performed by: TRANSPLANT SURGERY

## 2017-11-06 PROCEDURE — 84100 ASSAY OF PHOSPHORUS: CPT

## 2017-11-06 PROCEDURE — 25000003 PHARM REV CODE 250: Performed by: STUDENT IN AN ORGANIZED HEALTH CARE EDUCATION/TRAINING PROGRAM

## 2017-11-06 RX ORDER — HYDROMORPHONE HYDROCHLORIDE 1 MG/ML
0.5 INJECTION, SOLUTION INTRAMUSCULAR; INTRAVENOUS; SUBCUTANEOUS ONCE
Status: DISCONTINUED | OUTPATIENT
Start: 2017-11-06 | End: 2017-11-08 | Stop reason: HOSPADM

## 2017-11-06 RX ORDER — HYDROMORPHONE HYDROCHLORIDE 1 MG/ML
INJECTION, SOLUTION INTRAMUSCULAR; INTRAVENOUS; SUBCUTANEOUS
Status: COMPLETED
Start: 2017-11-06 | End: 2017-11-06

## 2017-11-06 RX ORDER — HYDROMORPHONE HYDROCHLORIDE 1 MG/ML
0.5 INJECTION, SOLUTION INTRAMUSCULAR; INTRAVENOUS; SUBCUTANEOUS ONCE
Status: DISCONTINUED | OUTPATIENT
Start: 2017-11-06 | End: 2017-11-06

## 2017-11-06 RX ADMIN — MYCOPHENOLATE MOFETIL 1000 MG: 250 CAPSULE ORAL at 08:11

## 2017-11-06 RX ADMIN — LEVETIRACETAM 500 MG: 500 TABLET ORAL at 11:11

## 2017-11-06 RX ADMIN — OXYCODONE AND ACETAMINOPHEN 1 TABLET: 10; 325 TABLET ORAL at 01:11

## 2017-11-06 RX ADMIN — HYDROMORPHONE HYDROCHLORIDE 0.5 MG: 1 INJECTION, SOLUTION INTRAMUSCULAR; INTRAVENOUS; SUBCUTANEOUS at 04:11

## 2017-11-06 RX ADMIN — SODIUM BICARBONATE 650 MG TABLET 1300 MG: at 11:11

## 2017-11-06 RX ADMIN — ATOVAQUONE 1500 MG: 750 SUSPENSION ORAL at 11:11

## 2017-11-06 RX ADMIN — HEPARIN SODIUM 5000 UNITS: 5000 INJECTION, SOLUTION INTRAVENOUS; SUBCUTANEOUS at 01:11

## 2017-11-06 RX ADMIN — CYCLOSPORINE 300 MG: 100 CAPSULE, LIQUID FILLED ORAL at 05:11

## 2017-11-06 RX ADMIN — ONDANSETRON 8 MG: 8 TABLET, ORALLY DISINTEGRATING ORAL at 08:11

## 2017-11-06 RX ADMIN — FLUCONAZOLE 200 MG: 200 TABLET ORAL at 11:11

## 2017-11-06 RX ADMIN — LEVETIRACETAM 500 MG: 500 TABLET ORAL at 08:11

## 2017-11-06 RX ADMIN — MYCOPHENOLATE MOFETIL 1000 MG: 250 CAPSULE ORAL at 11:11

## 2017-11-06 RX ADMIN — HEPARIN SODIUM 5000 UNITS: 5000 INJECTION, SOLUTION INTRAVENOUS; SUBCUTANEOUS at 04:11

## 2017-11-06 RX ADMIN — MORPHINE 100 MG: 10 SOLUTION ORAL at 11:11

## 2017-11-06 RX ADMIN — THIAMINE HCL (VITAMIN B1) 50 MG TABLET 100 MG: at 11:11

## 2017-11-06 RX ADMIN — ERYTHROPOIETIN 5000 UNITS: 10000 INJECTION, SOLUTION INTRAVENOUS; SUBCUTANEOUS at 10:11

## 2017-11-06 RX ADMIN — PREDNISONE 15 MG: 5 TABLET ORAL at 07:11

## 2017-11-06 RX ADMIN — DOCUSATE SODIUM 100 MG: 100 CAPSULE, LIQUID FILLED ORAL at 11:11

## 2017-11-06 RX ADMIN — FAMOTIDINE 20 MG: 20 TABLET, FILM COATED ORAL at 08:11

## 2017-11-06 RX ADMIN — OXYCODONE AND ACETAMINOPHEN 1 TABLET: 10; 325 TABLET ORAL at 04:11

## 2017-11-06 RX ADMIN — CYCLOSPORINE 300 MG: 100 CAPSULE, LIQUID FILLED ORAL at 07:11

## 2017-11-06 RX ADMIN — HEPARIN SODIUM 1000 UNITS: 1000 INJECTION, SOLUTION INTRAVENOUS; SUBCUTANEOUS at 10:11

## 2017-11-06 RX ADMIN — HEPARIN SODIUM 5000 UNITS: 5000 INJECTION, SOLUTION INTRAVENOUS; SUBCUTANEOUS at 08:11

## 2017-11-06 RX ADMIN — OXYCODONE AND ACETAMINOPHEN 1 TABLET: 10; 325 TABLET ORAL at 07:11

## 2017-11-06 RX ADMIN — OXYCODONE AND ACETAMINOPHEN 1 TABLET: 10; 325 TABLET ORAL at 08:11

## 2017-11-06 RX ADMIN — THERA TABS 1 TABLET: TAB at 11:11

## 2017-11-06 RX ADMIN — SODIUM CHLORIDE 300 ML: 900 INJECTION, SOLUTION INTRAVENOUS at 10:11

## 2017-11-06 NOTE — PROGRESS NOTES
[facility]  Nephrology  Progress Note    Patient Name: Jhonny Diana  MRN: [id number]  Admission Date: 9/27/2017  Hospital Length of Stay: 40 days  Attending Provider: Solis Chapa MD   Primary Care Physician: Provider Notinsystem  Principal Problem:Liver transplanted    Subjective:     HPI: Jhonny Diana is a 28 yo male with pMHX of childhood asthma and alcohol abuse who was was transferred to [facility] for hepatology evaluation for worsening liver functions.  Information was obtained from family at bedside and review of outside records, given that patient is obtunded.  According to mother at bedside, patient has had multiple admissions to the hospital over the past 6 months for recurrent abdominal pain.  She reports that he has been been seen the ED and has been diagnosed with diverticulitis, UTIs, gastroenteritis, and cholecystitis.  He was never admitted to the hospital and was always discharged.  On 09/06, patient presented to OSH for worsening abdominal pain and distension.  He was diagnosed at that time with ETOH hepatitis.  He was treated with steroids, ceftriaxone for SBP prophylaxis, and diuresed at that time for volume management.  He was discharged home on the 15th, after clearance from GI,a nd was instructed to follow-up with his PCP 1 week after discharge.  He presented to the hospital on 09/25 for worsening abdominal pain and distension with 20 kg weight gain.  He was transferred to [facility] for further care and Nephrology was consulted for LAURA.    On review of outside records, patient Scr on admission was 1.5 (with baseline at 1.0).  According to the family at the bedside, he was not urinating well at the OSH, and not responding to diuretics (spirionolactone/lasix combo).  His mother reports that patient continued to c/o abdominal and inability to urinate, but no chicas was placed until transfer to [facility] yesterday.  Record reviews shows relative hypotension with BPs in the low 90's.  His mother  reports that the nursing staff was administrating his narcotic regimen based on his blood pressures, and at times, medication was held due to hypotension.  Since admission to hospital, his UOP has improved, responding well in Lasix 40 IV (770 ml since admission, 700 ml so far this shift at time of consultation).  Admit labs revealed a SCr of 2.6, this morning improved down to 2.4.  Bilirubin remains elevated, >14.  Abdomen remains with tight distension, unable to perform paracentesis, as no significant fluid pocket found.  Mother reports patient did not have para's done at OSH.    Labs on the morning of  with bicarb of 15.  ABG with alkalemia and primary respiratory alkalosis and anion gap acidosis.     Interval History:   No events overnight. Patient seen on dialysis this morning; tolerating well. No complaints; tells us he is possibly going home tomorrow. UOP improvincc yesterday; 250cc this AM. Mother reports >400cc yesterday.     Review of patient's allergies indicates:   Allergen Reactions    Bactrim [sulfamethoxazole-trimethoprim] Other (See Comments)     Mookie Trell Syndrome     Current Facility-Administered Medications   Medication Frequency    0.9%  NaCl infusion (for blood administration) Q24H PRN    0.9%  NaCl infusion (for blood administration) Q24H PRN    0.9%  NaCl infusion PRN    atovaquone suspension 1,500 mg Daily    bisacodyl EC tablet 10 mg Daily    bisacodyl suppository 10 mg Daily PRN    cycloSPORINE modified (NEORAL) capsule 300 mg BID    dextrose 50% injection 12.5 g PRN    dextrose 50% injection 25 g PRN    docusate sodium capsule 100 mg TID PC    epoetin maurisio injection 5,000 Units Every Mon, Wed, Fri    ergocalciferol capsule 50,000 Units Q7 Days    famotidine tablet 20 mg QHS    fluconazole tablet 200 mg Daily    glucagon (human recombinant) injection 1 mg PRN    glucose chewable tablet 16 g PRN    glucose chewable tablet 24 g PRN    heparin (porcine)  injection 1,000 Units PRN    heparin (porcine) injection 5,000 Units Q8H    HYDROmorphone injection 0.5 mg Once    insulin aspart pen 0-5 Units QID (AC + HS) PRN    levETIRAcetam tablet 500 mg BID    multivitamin tablet 1 tablet Daily    mycophenolate capsule 1,000 mg BID    ondansetron disintegrating tablet 8 mg Q8H PRN    oxyCODONE-acetaminophen  mg per tablet 1 tablet Q4H PRN    oxyCODONE-acetaminophen 5-325 mg per tablet 1 tablet Q4H PRN    polyethylene glycol packet 17 g Daily    [START ON 11/11/2017] predniSONE tablet 10 mg Daily    Followed by    [START ON 11/18/2017] predniSONE tablet 5 mg Daily    Followed by    [START ON 11/25/2017] predniSONE tablet 2.5 mg Daily    predniSONE tablet 15 mg Daily    simethicone chewable tablet 80 mg TID PRN    sodium bicarbonate tablet 1,300 mg BID    thiamine tablet 100 mg Daily    valganciclovir 50 mg/ml oral solution 100 mg Every Mon, Wed, Fri       Objective:     Vital Signs (Most Recent):  Temp: 98.1 °F (36.7 °C) (11/06/17 1601)  Pulse: 90 (11/06/17 1601)  Resp: 18 (11/06/17 1601)  BP: 121/80 (11/06/17 1601)  SpO2: 98 % (11/06/17 1601)  O2 Device (Oxygen Therapy): room air (11/06/17 1601) Vital Signs (24h Range):  Temp:  [98 °F (36.7 °C)-98.8 °F (37.1 °C)] 98.1 °F (36.7 °C)  Pulse:  [82-99] 90  Resp:  [14-20] 18  SpO2:  [98 %-99 %] 98 %  BP: (109-131)/(59-87) 121/80     Weight: 80.3 kg (177 lb 0.5 oz) (11/06/17 0430)  Body mass index is 27.73 kg/m².  Body surface area is 1.95 meters squared.    I/O last 3 completed shifts:  In: 1640 [P.O.:1490; I.V.:50; Blood:100]  Out: 2060 [Urine:285; Other:1775]    Physical Exam   Constitutional: He is oriented to person, place, and time. He appears well-developed and well-nourished. No distress.   HENT:   Head: Normocephalic and atraumatic.   Eyes: Conjunctivae and EOM are normal.   Cardiovascular: Normal rate and regular rhythm.    Pulmonary/Chest: Effort normal and breath sounds normal.   Musculoskeletal:  He exhibits edema. He exhibits no deformity.   Neurological: He is alert and oriented to person, place, and time.   Skin: Skin is warm and dry. He is not diaphoretic.       Significant Labs:  CBC:   Recent Labs  Lab 11/06/17  0443   WBC 5.73   RBC 2.73*   HGB 8.4*   HCT 25.0*   *   MCV 92   MCH 30.8   MCHC 33.6     CMP:   Recent Labs  Lab 11/06/17  0443   GLU 82   CALCIUM 8.8   ALBUMIN 2.7*   PROT 4.9*      K 4.0   CO2 23      BUN 42*   CREATININE 5.3*   ALKPHOS 232*   ALT 8*   AST 12   BILITOT 2.3*     All labs within the past 24 hours have been reviewed.     Significant Imaging:  Labs: Reviewed    Assessment/Plan:     LAURA (acute kidney injury)    - likely ischemic ATN from decreased renal perfusion (volume shifts from paracentesis, anemia). Urine sediment with muddy brown casts  - underwent OLTx on 10/19; received intraop CRRT  - remains dialysis dependent at this time  - HD today with UF goal 1-2L as tolerated  - outpatient dialysis arranged for [facility] MWF 3pm  - bicarb currently at goal; will d/c supplementation  - okay to discharge from renal standpoint        Acute blood loss anemia    - continue VIDA with HD treatments for anemia support            Moon Carlton, PGY-5  Nephrology Fellow  [facility]  Pager: [phone]

## 2017-11-06 NOTE — PLAN OF CARE
Problem: Patient Care Overview  Goal: Plan of Care Review  Outcome: Ongoing (interventions implemented as appropriate)  * Patient remains free from falls.   * Seizure precautions maintained.   * Bed at lowest position and locked, call light within reach, non-slip socks on, mother at bedside, and side rails up x 2.  Encouraged patient to call for assistance when needed patient stated understanding.  * Edema noted to the bilateral lower extremities +3 pitting edema.  * Generalized ecchymosis noted   * Renal diet tolerating well.   * Abdomen distended tender/soft  * Chevron incision ANDRZEJ with staples no signs or symptoms of infection noted.  Incision painted with betadine by mother.   * RCW perm-a-cath (10/30/17) patent, dressing clean dry and intact no signs or symptoms of infection noted. Dressing changed on day shift next dressing change due to 11/11/17  * Left FA PIV 22 G (10/31/17) patent, dressing clean dry and intact no signs or symptoms of infection noted.  * Slight jaundice noted   * Oxygen saturation 98-99 % on room air.  Respirations even and unlabored no signs or symptoms of distress noted.   * Patient has complaints of pain to the abdomen PRN PO pain medication administered.   * Continuous pulse oximetry discontinued on day shift per MD order.   * Skin assessment preformed no breakdown noted   * Standard precautions maintained  * Continuous telemetry monitoring continued SR to ST    * Patient able to turn self no assistance needed.  * Patient voiding per urinal clear light carlos urine noted see flow sheet for intake and output totals  * Right old IRISH site Drainage bag clean dry and intact no signs or symptoms of infection noted. Serous drainage noted see flow sheet for output totals

## 2017-11-06 NOTE — ASSESSMENT & PLAN NOTE
- likely ischemic ATN from decreased renal perfusion (volume shifts from paracentesis, anemia). Urine sediment with muddy brown casts  - underwent OLTx on 10/19; received intraop CRRT  - remains dialysis dependent at this time  - HD today with UF goal 1-2L as tolerated  - [facility]  - bicarb currently at goal; will d/c supplementation  - okay to discharge from renal standpoint

## 2017-11-06 NOTE — SUBJECTIVE & OBJECTIVE
Interval History:   No events overnight. Patient seen on dialysis this morning; tolerating well. No complaints; tells us he is possibly going home tomorrow. UOP improvincc yesterday; 250cc this AM. Mother reports >400cc yesterday.     Review of patient's allergies indicates:   Allergen Reactions    Bactrim [sulfamethoxazole-trimethoprim] Other (See Comments)     Mookie Trell Syndrome     Current Facility-Administered Medications   Medication Frequency    0.9%  NaCl infusion (for blood administration) Q24H PRN    0.9%  NaCl infusion (for blood administration) Q24H PRN    0.9%  NaCl infusion PRN    atovaquone suspension 1,500 mg Daily    bisacodyl EC tablet 10 mg Daily    bisacodyl suppository 10 mg Daily PRN    cycloSPORINE modified (NEORAL) capsule 300 mg BID    dextrose 50% injection 12.5 g PRN    dextrose 50% injection 25 g PRN    docusate sodium capsule 100 mg TID PC    epoetin maurisio injection 5,000 Units Every Mon, Wed, Fri    ergocalciferol capsule 50,000 Units Q7 Days    famotidine tablet 20 mg QHS    fluconazole tablet 200 mg Daily    glucagon (human recombinant) injection 1 mg PRN    glucose chewable tablet 16 g PRN    glucose chewable tablet 24 g PRN    heparin (porcine) injection 1,000 Units PRN    heparin (porcine) injection 5,000 Units Q8H    HYDROmorphone injection 0.5 mg Once    insulin aspart pen 0-5 Units QID (AC + HS) PRN    levETIRAcetam tablet 500 mg BID    multivitamin tablet 1 tablet Daily    mycophenolate capsule 1,000 mg BID    ondansetron disintegrating tablet 8 mg Q8H PRN    oxyCODONE-acetaminophen  mg per tablet 1 tablet Q4H PRN    oxyCODONE-acetaminophen 5-325 mg per tablet 1 tablet Q4H PRN    polyethylene glycol packet 17 g Daily    [START ON 2017] predniSONE tablet 10 mg Daily    Followed by    [START ON 2017] predniSONE tablet 5 mg Daily    Followed by    [START ON 2017] predniSONE tablet 2.5 mg Daily    predniSONE tablet  15 mg Daily    simethicone chewable tablet 80 mg TID PRN    sodium bicarbonate tablet 1,300 mg BID    thiamine tablet 100 mg Daily    valganciclovir 50 mg/ml oral solution 100 mg Every Mon, Wed, Fri       Objective:     Vital Signs (Most Recent):  Temp: 98.1 °F (36.7 °C) (11/06/17 1601)  Pulse: 90 (11/06/17 1601)  Resp: 18 (11/06/17 1601)  BP: 121/80 (11/06/17 1601)  SpO2: 98 % (11/06/17 1601)  O2 Device (Oxygen Therapy): room air (11/06/17 1601) Vital Signs (24h Range):  Temp:  [98 °F (36.7 °C)-98.8 °F (37.1 °C)] 98.1 °F (36.7 °C)  Pulse:  [82-99] 90  Resp:  [14-20] 18  SpO2:  [98 %-99 %] 98 %  BP: (109-131)/(59-87) 121/80     Weight: 80.3 kg (177 lb 0.5 oz) (11/06/17 0430)  Body mass index is 27.73 kg/m².  Body surface area is 1.95 meters squared.    I/O last 3 completed shifts:  In: 1640 [P.O.:1490; I.V.:50; Blood:100]  Out: 2060 [Urine:285; Other:1775]    Physical Exam   Constitutional: He is oriented to person, place, and time. He appears well-developed and well-nourished. No distress.   HENT:   Head: Normocephalic and atraumatic.   Eyes: Conjunctivae and EOM are normal.   Cardiovascular: Normal rate and regular rhythm.    Pulmonary/Chest: Effort normal and breath sounds normal.   Musculoskeletal: He exhibits edema. He exhibits no deformity.   Neurological: He is alert and oriented to person, place, and time.   Skin: Skin is warm and dry. He is not diaphoretic.       Significant Labs:  CBC:   Recent Labs  Lab 11/06/17 0443   WBC 5.73   RBC 2.73*   HGB 8.4*   HCT 25.0*   *   MCV 92   MCH 30.8   MCHC 33.6     CMP:   Recent Labs  Lab 11/06/17  0443   GLU 82   CALCIUM 8.8   ALBUMIN 2.7*   PROT 4.9*      K 4.0   CO2 23      BUN 42*   CREATININE 5.3*   ALKPHOS 232*   ALT 8*   AST 12   BILITOT 2.3*     All labs within the past 24 hours have been reviewed.     Significant Imaging:  Labs: Reviewed

## 2017-11-06 NOTE — PROGRESS NOTES
[facility]  Liver Transplant  Progress Note    Patient Name: Jhonny Diana  MRN: [id number]  Admission Date: 2017  Hospital Length of Stay: 40 days  Code Status: Full Code  Primary Care Provider: Provider Notinsystem  Post-Operative Day: 18    ORGAN:   LIVER  Disease Etiology: Acute Alcoholic Hepatitis  Donor Type:    - Brain Death  CDC High Risk:   No  Donor CMV Status:   Donor CMV Status: Positive  Donor HBcAB:   Negative  Donor HCV Status:   Negative  Whole or Partial: Whole Liver  Biliary Anastomosis: End to End  Arterial Anatomy: Standard  Subjective:     History of Present Illness:  Jhonny Diana is a 28 yo male with hx of childhood asthma and heavy alcohol abuse (fifth of liquor daily since teenager, last drink 2.5 wks ago per mother) who was transferred to [facility] for higher level of care/liver transplant evaluation. Patient initially presented to OSH for a 2 mo hx of progressively worsening abdominal pain with associated fatigue, jaundice, and abdominal distention with ~20# weight gain.  Reports family hx of alcoholic cirrhosis in father. Pt was recently diagnosed with alcoholic hepatitis during his admission at OSH on -9/15. During that hospitalization, patient was given ceftriaxone for SBP ppx, prednisone for alcoholic hepatitis, and diuresed. Pt was discharged and instructed to follow up with PCP and AA within 1 wk. After his discharge, patient returned to ED 4 days after for increased weight gain and worsening abdominal pain/jaundice.     Mr. Diana was admitted to the [facility] on  for decompensated alcoholic hepatitis with high MELD score, severe HE, and LAURA. Hepatology consulted and followed to evaluate liver transplant candidacy. Pt started on albumin and lasix for LAURA and possibility for HRS. However, nephrology felt that LAURA is likely multifactorial: ATN given hypotensive episodes at OSH and toxic given severe hyperbilirubinemia; not truly HRS. Lactulose/rifaximin started  for HE and mental status slowly improved. Mr. Diana with significant ABD pain since admission with ABD distension. CT abdomen was significant for pericolonic fat stranding of right colon; no obstruction. Also with diffuse mesenteric edema. No significant ascites noted, no cholecystitis/cholelithiasis/ductal dilation. US liver with doppler showed appropriate vasculature.  He was stepped down to Hospital Medicine on 10/5.  Paracentesis was done on 10/9 with 3.3L removed and 10/12 with 1.8 removed, no infection. His diet was advanced and Dietary was consulted to help get adequate caloric intake. Listed for liver transplant 10/17 with MELD 36.  Pt received organ offer and underwent liver txp 10/19 which took place without complication.  Steroid induction used, CMV +/+.  Post operatively, LFTs/enzymes trended down nicely.  POD 5 U/S with elevated RIs and sluggish flow in HA system, otherwise ok.  Pt with LAURA prior to and s/p txp requiring dialysis.  He is tolerating HD with last HD 10/30.      Hospital Course:  Interval hx: Pt went for HD this morning. Noted by SISSY Lucio that pus draining from R side chevron incision above wound vac. Tracking found entire length of incision. Staples removed and wet to dry dressing placed. Will have wound vac see in AM. Possible d/c leonardo.     Scheduled Meds:   atovaquone  1,500 mg Oral Daily    bisacodyl  10 mg Oral Daily    cycloSPORINE modified  300 mg Oral BID    docusate sodium  100 mg Oral TID PC    epoetin maurisio  5,000 Units Intravenous Every Mon, Wed, Fri    ergocalciferol  50,000 Units Oral Q7 Days    famotidine  20 mg Oral QHS    fluconazole  200 mg Oral Daily    heparin (porcine)  5,000 Units Subcutaneous Q8H    HYDROmorphone  0.5 mg Intravenous Once    levETIRAcetam  500 mg Oral BID    multivitamin  1 tablet Oral Daily    mycophenolate  1,000 mg Oral BID    polyethylene glycol  17 g Oral Daily    [START ON 11/11/2017] predniSONE  10 mg Oral Daily    Followed by     [START ON 11/18/2017] predniSONE  5 mg Oral Daily    Followed by    [START ON 11/25/2017] predniSONE  2.5 mg Oral Daily    predniSONE  15 mg Oral Daily    thiamine  100 mg Oral Daily    valganciclovir 50 mg/ml  100 mg Oral Every Mon, Wed, Fri     Continuous Infusions:   PRN Meds:sodium chloride, sodium chloride, sodium chloride 0.9%, bisacodyl, dextrose 50%, dextrose 50%, glucagon (human recombinant), glucose, glucose, heparin (porcine), insulin aspart, ondansetron, oxyCODONE-acetaminophen, oxyCODONE-acetaminophen, simethicone    Review of Systems   Constitutional: Positive for activity change (decreased) and appetite change (improving). Negative for chills, diaphoresis, fatigue and fever.   HENT: Negative for congestion, mouth sores and trouble swallowing.    Eyes: Negative for visual disturbance.   Respiratory: Negative for cough, choking, shortness of breath and wheezing.    Cardiovascular: Negative for chest pain, palpitations and leg swelling.   Gastrointestinal: Positive for abdominal distention and abdominal pain. Negative for constipation, diarrhea, nausea and vomiting.   Endocrine: Negative.    Genitourinary: Negative for decreased urine volume, difficulty urinating, dysuria, hematuria and urgency.   Musculoskeletal: Negative for arthralgias, back pain and joint swelling.   Skin: Positive for wound. Negative for color change, pallor and rash.   Allergic/Immunologic: Positive for immunocompromised state.   Neurological: Positive for weakness. Negative for dizziness, tremors, seizures, syncope and headaches.   Hematological: Bruises/bleeds easily.   Psychiatric/Behavioral: Positive for sleep disturbance. Negative for agitation, confusion, decreased concentration, dysphoric mood, hallucinations and suicidal ideas. The patient is not nervous/anxious.    All other systems reviewed and are negative.    Objective:     Vital Signs (Most Recent):  Temp: 98.1 °F (36.7 °C) (11/06/17 1601)  Pulse: 90 (11/06/17  1601)  Resp: 18 (11/06/17 1601)  BP: 121/80 (11/06/17 1601)  SpO2: 98 % (11/06/17 1601) Vital Signs (24h Range):  Temp:  [98 °F (36.7 °C)-98.8 °F (37.1 °C)] 98.1 °F (36.7 °C)  Pulse:  [82-99] 90  Resp:  [14-20] 18  SpO2:  [98 %-99 %] 98 %  BP: (109-131)/(59-87) 121/80     Weight: 80.3 kg (177 lb 0.5 oz)  Body mass index is 27.73 kg/m².    Intake/Output - Last 3 Shifts       11/04 0700 - 11/05 0659 11/05 0700 - 11/06 0659 11/06 0700 - 11/07 0659    P.O. 440 1050     I.V. (mL/kg)  50 (0.6)     Blood 350 100     Other   600    Total Intake(mL/kg) 790 (10.1) 1200 (14.9) 600 (7.5)    Urine (mL/kg/hr) 75 (0) 210 (0.1) 250 (0.3)    Emesis/NG output   0 (0)    Other 750 (0.4) 1200 (0.6) 2850 (3.6)    Stool 0 (0) 0 (0) 0 (0)    Blood   0 (0)    Total Output 825 1410 3100    Net -35 -210 -2500           Urine Occurrence   0 x    Stool Occurrence 0 x 1 x 1 x    Emesis Occurrence   0 x          Physical Exam   Constitutional: He is oriented to person, place, and time. He appears well-developed and well-nourished. No distress.   Temporal and distal extremity muscle wasting   HENT:   Head: Normocephalic and atraumatic.   Mouth/Throat: No oropharyngeal exudate.   Eyes: EOM are normal. Pupils are equal, round, and reactive to light. Scleral icterus is present.   Neck: Normal range of motion. Neck supple.   Cardiovascular: Normal rate, regular rhythm, normal heart sounds and intact distal pulses.  Exam reveals no gallop and no friction rub.    No murmur heard.  Pulmonary/Chest: Effort normal and breath sounds normal. No respiratory distress. He has no wheezes. He has no rales. He exhibits no tenderness.   Dec R base   Abdominal: Soft. Bowel sounds are normal. He exhibits no distension. There is no tenderness. There is no rebound and no guarding.       Chevron incision opened, drainage yellow/brown    Musculoskeletal: Normal range of motion. He exhibits edema (abdominal edema >right lower quadrant).   Neurological: He is alert and  oriented to person, place, and time.   Skin: Skin is warm and dry. Capillary refill takes 2 to 3 seconds. He is not diaphoretic.   jaundiced   Psychiatric: He has a normal mood and affect. His behavior is normal. Judgment and thought content normal.   Nursing note and vitals reviewed.      Laboratory:  Immunosuppressants         Stop Route Frequency     cycloSPORINE modified (NEORAL) capsule 300 mg      -- Oral 2 times daily     mycophenolate capsule 1,000 mg      -- Oral 2 times daily        CBC:   Recent Labs  Lab 11/06/17  0443   WBC 5.73   RBC 2.73*   HGB 8.4*   HCT 25.0*   *   MCV 92   MCH 30.8   MCHC 33.6     CMP:   Recent Labs  Lab 11/06/17  0443   GLU 82   CALCIUM 8.8   ALBUMIN 2.7*   PROT 4.9*      K 4.0   CO2 23      BUN 42*   CREATININE 5.3*   ALKPHOS 232*   ALT 8*   AST 12   BILITOT 2.3*     Labs within the past 24 hours have been reviewed.    Diagnostic Results:  I have personally reviewed all pertinent imaging studies.    Assessment/Plan:     * Liver transplanted    - LFTs/enzymes trended down nicely.  Alk phos elevated POD #14- monitor closely.   - Cont bowel regimen.  - Transitioned pain control to scheduled acetaminophen + Tramadol as hallucinations with oxycodone- tolerating well.  - Liver US POD 5 with elevated RIs and slowed flow HA system.  US 10/23 technically difficult exam w difficulty visualizing arterial system and slow flow w no tardus parvus.  Repeat liver US POD# 13 showed continued sluggish flow in HA system.    - ASA started 11/2/17.  - Wound vac placed 10/29. Wd opened additional 8 cm 11/3-  veraflow wd vac placed afterwards. Entire chevron opened today 11/6. Wet-to-dry dressing in place. Wound vac to examine in AM.           Anasarca    - 2/2 liver disease, LAURA, and hypoalbuminemia  - Expect improvement once kidneys improve.    - Attempt to keep dressings dry  - cont to diurese per HD.         LAURA (acute kidney injury)    - Nephrology following.  Apprec recs.  -  Tolerated HD 11/03/17- tolerated well.  - Permcath placed 10/30/17  - Set up for outpt HD  - of note, small pleural effusion, PA pressure 33.  Did NOT consult cardiology officially but s/w them per phone- they recommend cont to diurese, tele and monitor.  Consult as necessary.          Abnormal finding on MRI of brain    - MRI findings 11/2 following second seizure-  Focus of diffusion restriction in the central missy. The location is somewhat atypical of infarcts from the pontine perforators.  The clinical presentation of seizures is also atypical for acute infarction.  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  Clinical correlation and short-term followup is recommended.  - stroke team consulted.  Not likely stroke.  Has signed off.  Apprec recs.  - Neurology consulted and following.  Apprec recs.  Need recs regarding when to repeat imaging.            Seizure    - Seizure 11/2/17 at 10am and 330pm.  Ativan NOT needed.  Seizures last 10-15 sec, started w stuttering same syllable, followed by eyes rolling back and full body convulsing.  Pt had postictal symptoms, drowsiness, tearful and sleepy for about 45 minutes. Prograf discontinued 11/2.  Neuro consulted- recommended Keppra load w 1gm IV f/b 500 mg PO bid.  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  The clinical presentation of seizures is also atypical for acute infarction.  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    - EEG 11/3/17- This likely represents a toxic metabolic encephalopathy.  There is no evidence of an epileptic process on this recording.  No seizures were recorded during this study.   - Apprec Neuro recs.  Need to get recs for when to repeat imaging.                Long-term use of immunosuppressant medication    - See prophylactic immunotherapy          Acute blood loss anemia    - PRBC x 2 u 10/26 and responded nicely  - h/h has trended  down.  HGb this am 7.1.  Old clot removed from chevron, bleeding subsided w pressure.  Transfused 1 u PRBC w appropriate response- repeat hgb 9.2.    - plan to start Procrit MWF w HD.    -transfused 1 unit prbc 11/4 with good response        Alcoholism /alcohol abuse    - Psych consulted, high risk for transplant. WILL NEED to complete ABU program s/p transplant          At risk for opportunistic infections    - D/C Dapsone secondary to worsening anemia and continue atovaquone for PCP prophylaxis  - Continue Valcyte for CMV prophylaxis  - Continue fluconazole for fungal prophylaxis          Prophylactic immunotherapy    - Continue prednisone taper, Cellcept.  - Will monitor for signs of toxic side effects, check daily tacrolimus troughs, and change meds accordingly.  - Discontinued Prograf 11/2/17.  Cyclo started 11/5        Severe malnutrition    - Dietary consulted and following.   - Continue protein powder        Physical deconditioning    - Working with PT/OT  - Rehab consult placed- on hold for seizure activity.              VTE Risk Mitigation         Ordered     heparin (porcine) injection 5,000 Units  Every 8 hours     Route:  Subcutaneous        10/30/17 0703     heparin (porcine) injection 1,000 Units  As needed (PRN)     Route:  Intra-Catheter        10/30/17 1649     Place sequential compression device  Until discontinued      10/20/17 0346     High Risk of VTE  Once      10/20/17 0346          The patients clinical status was discussed at multidisplinary rounds, involving transplant surgery, transplant medicine, pharmacy, nursing, nutrition, and social work    Discharge Planning:      Jenna M Oppenheimer, PA-C  Liver Transplant  [facility]

## 2017-11-06 NOTE — PROGRESS NOTES
Asked to see patient for malfunctioning wound vac  The veraflow irrigation pad has been pulled off of the vac dressing . Unable to replace without changing the entire dressing . Pt is not due for pain medication at this time , so we will just place back on the regular vac until dressing is due to be changed in the morning.   Zoë Murphy RN CWON  [id number]

## 2017-11-06 NOTE — ASSESSMENT & PLAN NOTE
- LFTs/enzymes trended down nicely.  Alk phos elevated POD #14- monitor closely.   - Cont bowel regimen.  - Transitioned pain control to scheduled acetaminophen + Tramadol as hallucinations with oxycodone- tolerating well.  - Liver US POD 5 with elevated RIs and slowed flow HA system.  US 10/23 technically difficult exam w difficulty visualizing arterial system and slow flow w no tardus parvus.  Repeat liver US POD# 13 showed continued sluggish flow in HA system.    - ASA started 11/2/17.  - Wound vac placed 10/29. Wd opened additional 8 cm 11/3-  veraflow wd vac placed afterwards. Entire chevron opened today 11/6. Wet-to-dry dressing in place. Wound vac to examine in AM.

## 2017-11-06 NOTE — PROGRESS NOTES
Patient transported down to dialysis, notified dialysis RN that battery is dead on wound vac and when patient gets down to dialysis, it must be plugged in and resumed.  Report called to sandeep in dialysis.

## 2017-11-06 NOTE — PROGRESS NOTES
Patient received from floor with report from Khadijah BAUM RN.   Acute dialysis began per orders via right IJ tunneled catheter.

## 2017-11-06 NOTE — SUBJECTIVE & OBJECTIVE
Scheduled Meds:   atovaquone  1,500 mg Oral Daily    bisacodyl  10 mg Oral Daily    cycloSPORINE modified  300 mg Oral BID    docusate sodium  100 mg Oral TID PC    epoetin maurisio  5,000 Units Intravenous Every Mon, Wed, Fri    ergocalciferol  50,000 Units Oral Q7 Days    famotidine  20 mg Oral QHS    fluconazole  200 mg Oral Daily    heparin (porcine)  5,000 Units Subcutaneous Q8H    HYDROmorphone  0.5 mg Intravenous Once    levETIRAcetam  500 mg Oral BID    multivitamin  1 tablet Oral Daily    mycophenolate  1,000 mg Oral BID    polyethylene glycol  17 g Oral Daily    [START ON 11/11/2017] predniSONE  10 mg Oral Daily    Followed by    [START ON 11/18/2017] predniSONE  5 mg Oral Daily    Followed by    [START ON 11/25/2017] predniSONE  2.5 mg Oral Daily    predniSONE  15 mg Oral Daily    thiamine  100 mg Oral Daily    valganciclovir 50 mg/ml  100 mg Oral Every Mon, Wed, Fri     Continuous Infusions:   PRN Meds:sodium chloride, sodium chloride, sodium chloride 0.9%, bisacodyl, dextrose 50%, dextrose 50%, glucagon (human recombinant), glucose, glucose, heparin (porcine), insulin aspart, ondansetron, oxyCODONE-acetaminophen, oxyCODONE-acetaminophen, simethicone    Review of Systems   Constitutional: Positive for activity change (decreased) and appetite change (improving). Negative for chills, diaphoresis, fatigue and fever.   HENT: Negative for congestion, mouth sores and trouble swallowing.    Eyes: Negative for visual disturbance.   Respiratory: Negative for cough, choking, shortness of breath and wheezing.    Cardiovascular: Negative for chest pain, palpitations and leg swelling.   Gastrointestinal: Positive for abdominal distention and abdominal pain. Negative for constipation, diarrhea, nausea and vomiting.   Endocrine: Negative.    Genitourinary: Negative for decreased urine volume, difficulty urinating, dysuria, hematuria and urgency.   Musculoskeletal: Negative for arthralgias, back pain and  joint swelling.   Skin: Positive for wound. Negative for color change, pallor and rash.   Allergic/Immunologic: Positive for immunocompromised state.   Neurological: Positive for weakness. Negative for dizziness, tremors, seizures, syncope and headaches.   Hematological: Bruises/bleeds easily.   Psychiatric/Behavioral: Positive for sleep disturbance. Negative for agitation, confusion, decreased concentration, dysphoric mood, hallucinations and suicidal ideas. The patient is not nervous/anxious.    All other systems reviewed and are negative.    Objective:     Vital Signs (Most Recent):  Temp: 98.1 °F (36.7 °C) (11/06/17 1601)  Pulse: 90 (11/06/17 1601)  Resp: 18 (11/06/17 1601)  BP: 121/80 (11/06/17 1601)  SpO2: 98 % (11/06/17 1601) Vital Signs (24h Range):  Temp:  [98 °F (36.7 °C)-98.8 °F (37.1 °C)] 98.1 °F (36.7 °C)  Pulse:  [82-99] 90  Resp:  [14-20] 18  SpO2:  [98 %-99 %] 98 %  BP: (109-131)/(59-87) 121/80     Weight: 80.3 kg (177 lb 0.5 oz)  Body mass index is 27.73 kg/m².    Intake/Output - Last 3 Shifts       11/04 0700 - 11/05 0659 11/05 0700 - 11/06 0659 11/06 0700 - 11/07 0659    P.O. 440 1050     I.V. (mL/kg)  50 (0.6)     Blood 350 100     Other   600    Total Intake(mL/kg) 790 (10.1) 1200 (14.9) 600 (7.5)    Urine (mL/kg/hr) 75 (0) 210 (0.1) 250 (0.3)    Emesis/NG output   0 (0)    Other 750 (0.4) 1200 (0.6) 2850 (3.6)    Stool 0 (0) 0 (0) 0 (0)    Blood   0 (0)    Total Output 825 1410 3100    Net -35 -210 -2500           Urine Occurrence   0 x    Stool Occurrence 0 x 1 x 1 x    Emesis Occurrence   0 x          Physical Exam   Constitutional: He is oriented to person, place, and time. He appears well-developed and well-nourished. No distress.   Temporal and distal extremity muscle wasting   HENT:   Head: Normocephalic and atraumatic.   Mouth/Throat: No oropharyngeal exudate.   Eyes: EOM are normal. Pupils are equal, round, and reactive to light. Scleral icterus is present.   Neck: Normal range of  motion. Neck supple.   Cardiovascular: Normal rate, regular rhythm, normal heart sounds and intact distal pulses.  Exam reveals no gallop and no friction rub.    No murmur heard.  Pulmonary/Chest: Effort normal and breath sounds normal. No respiratory distress. He has no wheezes. He has no rales. He exhibits no tenderness.   Dec R base   Abdominal: Soft. Bowel sounds are normal. He exhibits no distension. There is no tenderness. There is no rebound and no guarding.       Chevron incision opened, drainage yellow/brown    Musculoskeletal: Normal range of motion. He exhibits edema (abdominal edema >right lower quadrant).   Neurological: He is alert and oriented to person, place, and time.   Skin: Skin is warm and dry. Capillary refill takes 2 to 3 seconds. He is not diaphoretic.   jaundiced   Psychiatric: He has a normal mood and affect. His behavior is normal. Judgment and thought content normal.   Nursing note and vitals reviewed.      Laboratory:  Immunosuppressants         Stop Route Frequency     cycloSPORINE modified (NEORAL) capsule 300 mg      -- Oral 2 times daily     mycophenolate capsule 1,000 mg      -- Oral 2 times daily        CBC:   Recent Labs  Lab 11/06/17  0443   WBC 5.73   RBC 2.73*   HGB 8.4*   HCT 25.0*   *   MCV 92   MCH 30.8   MCHC 33.6     CMP:   Recent Labs  Lab 11/06/17  0443   GLU 82   CALCIUM 8.8   ALBUMIN 2.7*   PROT 4.9*      K 4.0   CO2 23      BUN 42*   CREATININE 5.3*   ALKPHOS 232*   ALT 8*   AST 12   BILITOT 2.3*     Labs within the past 24 hours have been reviewed.    Diagnostic Results:  I have personally reviewed all pertinent imaging studies.

## 2017-11-06 NOTE — ASSESSMENT & PLAN NOTE
- Continue prednisone taper, Cellcept.  - Will monitor for signs of toxic side effects, check daily tacrolimus troughs, and change meds accordingly.  - Discontinued Prograf 11/2/17.  Cyclo started 11/5

## 2017-11-06 NOTE — NURSING
RIMA De La Garza came to bedside to assess purulent drainage seen oozing from patient's incision. RIMA De La Garza and SAMREEN Godwin removed staples from entire incision. Wound vac removed and incision and wound packed with wet to dry dressing. Pt medicated for pain during procedure.

## 2017-11-06 NOTE — PLAN OF CARE
Problem: Patient Care Overview  Goal: Plan of Care Review  Outcome: Ongoing (interventions implemented as appropriate)  27 y.o. POD # 18 from liver transplant. Pt had 2L removed in HD this am. Wound vac dressing changed and abd pad applied to IRISH drain sites as ostomy bag that was in place ripped. Pt ambulates in room with stand by assist. Pt to possibly d/c tomorrow to [facility]. Pain meds given x 2 during shift.

## 2017-11-06 NOTE — PROGRESS NOTES
Dialysis completed. Right IJ tunneled catheter flushed with normal saline, heparinized, capped and taped. Patient dialyzed for 3 hours with fluid removal of 2 liters. Tolerated well with stable vital signs. Report called to Khadijah BAUM RN. Patient departed unit via wheelchair accompanied by Khadijah BAUM RN.

## 2017-11-06 NOTE — PROGRESS NOTES
EZEKIEL met with patient's mom this morning to discuss dc planning needs, as patient was away at dailysis.   Per LTS rounding team, pt will be ready for dc from the hospital tomorrow.   Pts mom with a lot of questions about dc planning needs.  Pts mom presents alert and oriented x 4, communicative and taking down notes.  Mom coping appropriately a this time.  EZEKIEL notified [facility] that pt would be dc, there is an [phone] for pts mom to check into tomorrow between 10am and 10:30 am, pts mom verbalized understanding.     EZEKIEL provided pts mom with outpatient acute HD information, [facility], [facility] at 3pm with start date for Wed 11/8/17.  E[facility]KIEL [facility] Tuesday and start of HD on Wed.      EZEKIEL discussed patient's wound vac, which is in pts room and need for home health wound vac care and PT at dc.   Pts mom in agreement to make referral to [facility] ([phone]), if they cannot accept pts insurance, then try [facility].   EZEKIEL spoke with Brandy with [facility] ([phone]), they do accept pts insurance and will start processing.    Pt will require RW at dc as well, pts mom provides permission for referral to [facility].  EZEKIEL will have RW delivered to pts hospital room on Tuesday.      Pts mom inquiring about patient enrolling in [facility], EZEKIEL verified that yes pt does need to enroll, but EZEKIEL concerned patient will be able to meet all of ABU requirements the first week or two he is out of the hospital.    EZEKIEL emailed Cassy FUENTES at [facility] about patient enrollment, but needing acute HD at this time.  EZEKIEL provided psychiatry contact information for pt and pts mother for future enrollment.      No other  Psychosocial needs identified by pts mom at this time.  EZEKIEL remains available for pt and pts mom for all dc planning needs, resources, education and psychosocial support.

## 2017-11-06 NOTE — PT/OT/SLP PROGRESS
Occupational Therapy      Jhonny Diana  MRN: [id number]    Patient not seen today secondary to Dialysis. Therapist unable to return in PM. Will follow-up at next scheduled OT session.    Samantha Lopez, OT  11/6/2017

## 2017-11-07 PROBLEM — K70.10 ALCOHOLIC HEPATITIS WITHOUT ASCITES: Status: ACTIVE | Noted: 2017-10-24

## 2017-11-07 LAB
ALBUMIN SERPL BCP-MCNC: 2.6 G/DL
ALP SERPL-CCNC: 211 U/L
ALT SERPL W/O P-5'-P-CCNC: 9 U/L
ANION GAP SERPL CALC-SCNC: 10 MMOL/L
AST SERPL-CCNC: 12 U/L
BACTERIA BLD CULT: NORMAL
BACTERIA BLD CULT: NORMAL
BASOPHILS # BLD AUTO: 0.06 K/UL
BASOPHILS NFR BLD: 1 %
BILIRUB SERPL-MCNC: 2.4 MG/DL
BUN SERPL-MCNC: 25 MG/DL
CALCIUM SERPL-MCNC: 8.7 MG/DL
CHLORIDE SERPL-SCNC: 105 MMOL/L
CO2 SERPL-SCNC: 23 MMOL/L
CREAT SERPL-MCNC: 3.8 MG/DL
CYCLOSPORINE BLD LC/MS/MS-MCNC: 240 NG/ML
DIFFERENTIAL METHOD: ABNORMAL
EOSINOPHIL # BLD AUTO: 0 K/UL
EOSINOPHIL NFR BLD: 0.2 %
ERYTHROCYTE [DISTWIDTH] IN BLOOD BY AUTOMATED COUNT: 19.5 %
EST. GFR  (AFRICAN AMERICAN): 23.5 ML/MIN/1.73 M^2
EST. GFR  (NON AFRICAN AMERICAN): 20.3 ML/MIN/1.73 M^2
GLUCOSE SERPL-MCNC: 78 MG/DL
HCT VFR BLD AUTO: 26.1 %
HGB BLD-MCNC: 8.9 G/DL
IMM GRANULOCYTES # BLD AUTO: 0.03 K/UL
IMM GRANULOCYTES NFR BLD AUTO: 0.5 %
LYMPHOCYTES # BLD AUTO: 1.4 K/UL
LYMPHOCYTES NFR BLD: 23.3 %
MAGNESIUM SERPL-MCNC: 1.5 MG/DL
MCH RBC QN AUTO: 31.7 PG
MCHC RBC AUTO-ENTMCNC: 34.1 G/DL
MCV RBC AUTO: 93 FL
MONOCYTES # BLD AUTO: 0.5 K/UL
MONOCYTES NFR BLD: 7.9 %
NEUTROPHILS # BLD AUTO: 4 K/UL
NEUTROPHILS NFR BLD: 67.1 %
NRBC BLD-RTO: 0 /100 WBC
PHOSPHATE SERPL-MCNC: 3.3 MG/DL
PLATELET # BLD AUTO: 145 K/UL
PMV BLD AUTO: 10.2 FL
POTASSIUM SERPL-SCNC: 3.6 MMOL/L
PROT SERPL-MCNC: 5 G/DL
RBC # BLD AUTO: 2.81 M/UL
SODIUM SERPL-SCNC: 138 MMOL/L
TACROLIMUS BLD-MCNC: <1.5 NG/ML
WBC # BLD AUTO: 5.92 K/UL

## 2017-11-07 PROCEDURE — 63600175 PHARM REV CODE 636 W HCPCS: Performed by: NURSE PRACTITIONER

## 2017-11-07 PROCEDURE — 20600001 HC STEP DOWN PRIVATE ROOM

## 2017-11-07 PROCEDURE — 97608 NEG PRS WND THER NDME>50SQCM: CPT

## 2017-11-07 PROCEDURE — 25000003 PHARM REV CODE 250: Performed by: STUDENT IN AN ORGANIZED HEALTH CARE EDUCATION/TRAINING PROGRAM

## 2017-11-07 PROCEDURE — 99233 SBSQ HOSP IP/OBS HIGH 50: CPT | Mod: 24,,, | Performed by: INTERNAL MEDICINE

## 2017-11-07 PROCEDURE — 99233 SBSQ HOSP IP/OBS HIGH 50: CPT | Mod: 24,,, | Performed by: NURSE PRACTITIONER

## 2017-11-07 PROCEDURE — 85025 COMPLETE CBC W/AUTO DIFF WBC: CPT

## 2017-11-07 PROCEDURE — 63600175 PHARM REV CODE 636 W HCPCS: Performed by: STUDENT IN AN ORGANIZED HEALTH CARE EDUCATION/TRAINING PROGRAM

## 2017-11-07 PROCEDURE — 25000003 PHARM REV CODE 250: Performed by: NURSE PRACTITIONER

## 2017-11-07 PROCEDURE — 80053 COMPREHEN METABOLIC PANEL: CPT

## 2017-11-07 PROCEDURE — 25000003 PHARM REV CODE 250: Performed by: TRANSPLANT SURGERY

## 2017-11-07 PROCEDURE — 80158 DRUG ASSAY CYCLOSPORINE: CPT

## 2017-11-07 PROCEDURE — 97803 MED NUTRITION INDIV SUBSEQ: CPT | Performed by: DIETITIAN, REGISTERED

## 2017-11-07 PROCEDURE — 83735 ASSAY OF MAGNESIUM: CPT

## 2017-11-07 PROCEDURE — 25000003 PHARM REV CODE 250: Performed by: PHYSICIAN ASSISTANT

## 2017-11-07 PROCEDURE — 80197 ASSAY OF TACROLIMUS: CPT

## 2017-11-07 PROCEDURE — 97110 THERAPEUTIC EXERCISES: CPT

## 2017-11-07 PROCEDURE — 97530 THERAPEUTIC ACTIVITIES: CPT

## 2017-11-07 PROCEDURE — 36415 COLL VENOUS BLD VENIPUNCTURE: CPT

## 2017-11-07 PROCEDURE — 84100 ASSAY OF PHOSPHORUS: CPT

## 2017-11-07 PROCEDURE — 63600175 PHARM REV CODE 636 W HCPCS: Performed by: PHYSICIAN ASSISTANT

## 2017-11-07 PROCEDURE — 97116 GAIT TRAINING THERAPY: CPT

## 2017-11-07 RX ORDER — LANOLIN ALCOHOL/MO/W.PET/CERES
100 CREAM (GRAM) TOPICAL DAILY
Qty: 30 TABLET | Refills: 6 | Status: ON HOLD | OUTPATIENT
Start: 2017-11-07 | End: 2018-04-09 | Stop reason: HOSPADM

## 2017-11-07 RX ORDER — ERGOCALCIFEROL 1.25 MG/1
50000 CAPSULE ORAL
Qty: 4 CAPSULE | Refills: 5 | Status: ON HOLD | OUTPATIENT
Start: 2017-11-07 | End: 2018-04-09

## 2017-11-07 RX ORDER — ATOVAQUONE 750 MG/5ML
1500 SUSPENSION ORAL DAILY
Qty: 300 ML | Refills: 5 | Status: ON HOLD | OUTPATIENT
Start: 2017-10-19 | End: 2018-04-09 | Stop reason: HOSPADM

## 2017-11-07 RX ORDER — HYDROMORPHONE HYDROCHLORIDE 1 MG/ML
0.5 INJECTION, SOLUTION INTRAMUSCULAR; INTRAVENOUS; SUBCUTANEOUS ONCE
Status: COMPLETED | OUTPATIENT
Start: 2017-11-07 | End: 2017-11-07

## 2017-11-07 RX ORDER — FUROSEMIDE 10 MG/ML
100 INJECTION INTRAMUSCULAR; INTRAVENOUS ONCE
Status: COMPLETED | OUTPATIENT
Start: 2017-11-07 | End: 2017-11-07

## 2017-11-07 RX ORDER — CYCLOSPORINE 100 MG/1
300 CAPSULE, LIQUID FILLED ORAL 2 TIMES DAILY
Qty: 180 CAPSULE | Refills: 11 | Status: SHIPPED | OUTPATIENT
Start: 2017-11-07 | End: 2017-11-08

## 2017-11-07 RX ORDER — CYCLOSPORINE 25 MG/1
25 CAPSULE, LIQUID FILLED ORAL 2 TIMES DAILY
Qty: 60 CAPSULE | Refills: 11 | Status: SHIPPED | OUTPATIENT
Start: 2017-11-07 | End: 2017-11-08

## 2017-11-07 RX ORDER — MAGNESIUM SULFATE HEPTAHYDRATE 40 MG/ML
2 INJECTION, SOLUTION INTRAVENOUS ONCE
Status: COMPLETED | OUTPATIENT
Start: 2017-11-07 | End: 2017-11-07

## 2017-11-07 RX ORDER — SODIUM CHLORIDE 9 MG/ML
INJECTION, SOLUTION INTRAVENOUS
Status: DISCONTINUED | OUTPATIENT
Start: 2017-11-08 | End: 2017-11-08 | Stop reason: HOSPADM

## 2017-11-07 RX ORDER — VALGANCICLOVIR HYDROCHLORIDE 50 MG/ML
100 POWDER, FOR SOLUTION ORAL
Qty: 24 ML | Refills: 2 | Status: ON HOLD | OUTPATIENT
Start: 2017-10-19 | End: 2017-11-21 | Stop reason: DRUGHIGH

## 2017-11-07 RX ORDER — LEVETIRACETAM 500 MG/1
500 TABLET ORAL 2 TIMES DAILY
Qty: 60 TABLET | Refills: 11 | Status: SHIPPED | OUTPATIENT
Start: 2017-11-07 | End: 2018-07-23

## 2017-11-07 RX ADMIN — PREDNISONE 15 MG: 5 TABLET ORAL at 09:11

## 2017-11-07 RX ADMIN — ERGOCALCIFEROL 50000 UNITS: 1.25 CAPSULE ORAL at 09:11

## 2017-11-07 RX ADMIN — HEPARIN SODIUM 5000 UNITS: 5000 INJECTION, SOLUTION INTRAVENOUS; SUBCUTANEOUS at 03:11

## 2017-11-07 RX ADMIN — MYCOPHENOLATE MOFETIL 1000 MG: 250 CAPSULE ORAL at 09:11

## 2017-11-07 RX ADMIN — MAGNESIUM SULFATE IN WATER 2 G: 40 INJECTION, SOLUTION INTRAVENOUS at 11:11

## 2017-11-07 RX ADMIN — DOCUSATE SODIUM 100 MG: 100 CAPSULE, LIQUID FILLED ORAL at 03:11

## 2017-11-07 RX ADMIN — LEVETIRACETAM 500 MG: 500 TABLET ORAL at 09:11

## 2017-11-07 RX ADMIN — ATOVAQUONE 1500 MG: 750 SUSPENSION ORAL at 09:11

## 2017-11-07 RX ADMIN — THERA TABS 1 TABLET: TAB at 09:11

## 2017-11-07 RX ADMIN — DOCUSATE SODIUM 100 MG: 100 CAPSULE, LIQUID FILLED ORAL at 09:11

## 2017-11-07 RX ADMIN — ONDANSETRON 8 MG: 8 TABLET, ORALLY DISINTEGRATING ORAL at 09:11

## 2017-11-07 RX ADMIN — CYCLOSPORINE 300 MG: 100 CAPSULE, LIQUID FILLED ORAL at 09:11

## 2017-11-07 RX ADMIN — THIAMINE HCL (VITAMIN B1) 50 MG TABLET 100 MG: at 09:11

## 2017-11-07 RX ADMIN — OXYCODONE AND ACETAMINOPHEN 1 TABLET: 10; 325 TABLET ORAL at 06:11

## 2017-11-07 RX ADMIN — FLUCONAZOLE 200 MG: 200 TABLET ORAL at 09:11

## 2017-11-07 RX ADMIN — HYDROMORPHONE HYDROCHLORIDE 0.5 MG: 1 INJECTION, SOLUTION INTRAMUSCULAR; INTRAVENOUS; SUBCUTANEOUS at 11:11

## 2017-11-07 RX ADMIN — CYCLOSPORINE 300 MG: 100 CAPSULE, LIQUID FILLED ORAL at 06:11

## 2017-11-07 RX ADMIN — FUROSEMIDE 100 MG: 10 INJECTION, SOLUTION INTRAVENOUS at 10:11

## 2017-11-07 RX ADMIN — HEPARIN SODIUM 5000 UNITS: 5000 INJECTION, SOLUTION INTRAVENOUS; SUBCUTANEOUS at 09:11

## 2017-11-07 RX ADMIN — FAMOTIDINE 20 MG: 20 TABLET, FILM COATED ORAL at 09:11

## 2017-11-07 RX ADMIN — OXYCODONE AND ACETAMINOPHEN 1 TABLET: 10; 325 TABLET ORAL at 08:11

## 2017-11-07 RX ADMIN — OXYCODONE AND ACETAMINOPHEN 1 TABLET: 10; 325 TABLET ORAL at 12:11

## 2017-11-07 NOTE — PLAN OF CARE
Problem: Patient Care Overview  Goal: Plan of Care Review  Outcome: Ongoing (interventions implemented as appropriate)  Pt AAO, mother at the beside. VSS, see flowsheet for further assessment details.    Telemetry monitoring dc'ed.     Ltx(10/19/17): Chevron dressing intact; changed during AM shift(wet to dry dressing change), wound vac to be placed again on 11/7. RLQ dressing continues to leak copious amounts of serous fluid; dressing changed X 3. Pain well controlled with PO pain meds. Self meds pulled with 100% accuracy.    I/Os monitored. Pt remains oliguric; HD last performed on 11/6-2L removed. No BM overnight.    Fall precautions in place; pt remains free of injury. Daily labs monitored. Possible d/c 11/7 per MD notes.    Pt up ad reagan; pt ambulated with stand by assist. No new skin breakdown noted

## 2017-11-07 NOTE — PROGRESS NOTES
[facility]  Nephrology  Progress Note    Patient Name: Jhonny Diana  MRN: [id number]  Admission Date: 9/27/2017  Hospital Length of Stay: 41 days  Attending Provider: Solis Chapa MD   Primary Care Physician: Provider Notinsystem  Principal Problem:Liver transplanted    Subjective:     HPI: Jhonny Diana is a 28 yo male with pMHX of childhood asthma and alcohol abuse who was was transferred to [facility] for hepatology evaluation for worsening liver functions.  Information was obtained from family at bedside and review of outside records, given that patient is obtunded.  According to mother at bedside, patient has had multiple admissions to the hospital over the past 6 months for recurrent abdominal pain.  She reports that he has been been seen the ED and has been diagnosed with diverticulitis, UTIs, gastroenteritis, and cholecystitis.  He was never admitted to the hospital and was always discharged.  On 09/06, patient presented to OSH for worsening abdominal pain and distension.  He was diagnosed at that time with ETOH hepatitis.  He was treated with steroids, ceftriaxone for SBP prophylaxis, and diuresed at that time for volume management.  He was discharged home on the 15th, after clearance from GI,a nd was instructed to follow-up with his PCP 1 week after discharge.  He presented to the hospital on 09/25 for worsening abdominal pain and distension with 20 kg weight gain.  He was transferred to [facility] for further care and Nephrology was consulted for LAURA.    On review of outside records, patient Scr on admission was 1.5 (with baseline at 1.0).  According to the family at the bedside, he was not urinating well at the OSH, and not responding to diuretics (spirionolactone/lasix combo).  His mother reports that patient continued to c/o abdominal and inability to urinate, but no chicas was placed until transfer to [facility] yesterday.  Record reviews shows relative hypotension with BPs in the low 90's.  His mother  reports that the nursing staff was administrating his narcotic regimen based on his blood pressures, and at times, medication was held due to hypotension.  Since admission to hospital, his UOP has improved, responding well in Lasix 40 IV (770 ml since admission, 700 ml so far this shift at time of consultation).  Admit labs revealed a SCr of 2.6, this morning improved down to 2.4.  Bilirubin remains elevated, >14.  Abdomen remains with tight distension, unable to perform paracentesis, as no significant fluid pocket found.  Mother reports patient did not have para's done at OSH.    Labs on the morning of 09/27 with bicarb of 15.  ABG with alkalemia and primary respiratory alkalosis and anion gap acidosis.     Interval History:   Received HD yesterday with 2L removed. No events overnight. Doing okay this morning; ambulating with PT around hallway. No SOB. UOP 250cc yesterday.     Review of patient's allergies indicates:   Allergen Reactions    Bactrim [sulfamethoxazole-trimethoprim] Other (See Comments)     Mookie Trell Syndrome     Current Facility-Administered Medications   Medication Frequency    0.9%  NaCl infusion (for blood administration) Q24H PRN    0.9%  NaCl infusion (for blood administration) Q24H PRN    0.9%  NaCl infusion PRN    atovaquone suspension 1,500 mg Daily    bisacodyl EC tablet 10 mg Daily    bisacodyl suppository 10 mg Daily PRN    cycloSPORINE modified (NEORAL) capsule 300 mg BID    dextrose 50% injection 12.5 g PRN    dextrose 50% injection 25 g PRN    docusate sodium capsule 100 mg TID PC    epoetin maurisio injection 5,000 Units Every Mon, Wed, Fri    ergocalciferol capsule 50,000 Units Q7 Days    famotidine tablet 20 mg QHS    fluconazole tablet 200 mg Daily    glucagon (human recombinant) injection 1 mg PRN    glucose chewable tablet 16 g PRN    glucose chewable tablet 24 g PRN    heparin (porcine) injection 1,000 Units PRN    heparin (porcine) injection 5,000 Units Q8H     HYDROmorphone injection 0.5 mg Once    insulin aspart pen 0-5 Units QID (AC + HS) PRN    levETIRAcetam tablet 500 mg BID    magnesium sulfate 2g in water 50mL IVPB (premix) Once    multivitamin tablet 1 tablet Daily    mycophenolate capsule 1,000 mg BID    ondansetron disintegrating tablet 8 mg Q8H PRN    oxyCODONE-acetaminophen  mg per tablet 1 tablet Q4H PRN    oxyCODONE-acetaminophen 5-325 mg per tablet 1 tablet Q4H PRN    polyethylene glycol packet 17 g Daily    [START ON 11/11/2017] predniSONE tablet 10 mg Daily    Followed by    [START ON 11/18/2017] predniSONE tablet 5 mg Daily    Followed by    [START ON 11/25/2017] predniSONE tablet 2.5 mg Daily    predniSONE tablet 15 mg Daily    simethicone chewable tablet 80 mg TID PRN    thiamine tablet 100 mg Daily    valganciclovir 50 mg/ml oral solution 100 mg Every Mon, Wed, Fri       Objective:     Vital Signs (Most Recent):  Temp: 98 °F (36.7 °C) (11/07/17 1221)  Pulse: 103 (11/07/17 1221)  Resp: 18 (11/07/17 1221)  BP: 129/78 (11/07/17 1221)  SpO2: 99 % (11/07/17 1221)  O2 Device (Oxygen Therapy): room air (11/07/17 1221) Vital Signs (24h Range):  Temp:  [98 °F (36.7 °C)-99.3 °F (37.4 °C)] 98 °F (36.7 °C)  Pulse:  [] 103  Resp:  [18-20] 18  SpO2:  [97 %-100 %] 99 %  BP: (113-129)/(67-80) 129/78     Weight: 75.8 kg (167 lb 1.7 oz) (11/07/17 0500)  Body mass index is 26.17 kg/m².  Body surface area is 1.89 meters squared.    I/O last 3 completed shifts:  In: 1260 [P.O.:560; Blood:100; Other:600]  Out: 3400 [Urine:250; Other:3150]    Physical Exam   Constitutional: He is oriented to person, place, and time. He appears well-developed and well-nourished. No distress.   HENT:   Head: Normocephalic and atraumatic.   Eyes: Conjunctivae and EOM are normal.   Cardiovascular: Normal rate and regular rhythm.    Pulmonary/Chest: Effort normal and breath sounds normal.   Musculoskeletal: He exhibits edema. He exhibits no deformity.    Neurological: He is alert and oriented to person, place, and time.   Skin: Skin is warm and dry. He is not diaphoretic.       Significant Labs:  CBC:   Recent Labs  Lab 11/07/17 0427   WBC 5.92   RBC 2.81*   HGB 8.9*   HCT 26.1*   *   MCV 93   MCH 31.7*   MCHC 34.1     CMP:   Recent Labs  Lab 11/07/17 0427   GLU 78   CALCIUM 8.7   ALBUMIN 2.6*   PROT 5.0*      K 3.6   CO2 23      BUN 25*   CREATININE 3.8*   ALKPHOS 211*   ALT 9*   AST 12   BILITOT 2.4*     All labs within the past 24 hours have been reviewed.     Significant Imaging:  Labs: Reviewed    Assessment/Plan:     LAURA (acute kidney injury)    - likely ischemic ATN from decreased renal perfusion (volume shifts from paracentesis, anemia). Urine sediment with muddy brown casts  - underwent OLTx on 10/19; received intraop CRRT  - remains dialysis dependent at this time  - HD yesterday with UF 2L  - outpatient dialysis chair arranged at [facility] MWF 3pm  - next HD planned for tomorrow; either inpatient or outpatient  - currently oliguric; anticipating renal recovery. Might still need a few weeks of HD though.         Acute blood loss anemia    - continue VIDA with HD treatments for anemia support            Moon Carlton, PGY-5  Nephrology Fellow  [facility]  Pager: [phone]  I have reviewed and concur with the resident's history, physical, assessment, and plan. I have personally interviewed and examined the patient at bedside.

## 2017-11-07 NOTE — PROGRESS NOTES
[facility]  Adult Nutrition  Progress Note    SUMMARY     Recommendations    Recommendation/Intervention: PO intake 50-75%. Refusing ONS. Continue renal diet with beneprotein TID. Encourage intake. RD following.     Goals: PO intake to meet 85% of EEN/EPN  Nutrition Goal Status: progressing towards goal  Communication of RD Recs: reviewed with RN      Reason for Assessment    Reason for Assessment: RD follow-up  Diagnosis: transplant/postoperative complications (liver transplant POD# 13)  Relevent Medical History: ETOH abuse, severe malnutrition, LAURA   Interdisciplinary Rounds: attended  Level of Care:  (F/U x1 weekly)  General Information Comments: Pt continues with 75% PO intake, ate cake for breakfast from outside; no GI Complaints    Nutrition Discharge Planning: PO intake will meet 85% of EEN/EPN    Nutrition Prescription Ordered    Current Diet Order: Renal  Oral Nutrition Supplement: Beneprotein TID     Evaluation of Received Nutrients/Fluid Intake     IV Fluid (mL): 7390     I/O: -7L since admit       Fluid Required: not meeting needs  Comments: LBM 11/6  Tolerance: tolerating  % Intake of Estimated Energy Needs: 75 - 100 %  % Meal Intake: 75%     Nutrition Risk Screen     Nutrition Risk Screen: dysphagia or difficulty swallowing    Nutrition/Diet History    Patient Reported Diet/Restrictions/Preferences: renal  Typical Food/Fluid Intake: PT eating ~50-75% of meals depending on where its from.   Food Preferences: No cultural or Yarsani preferences noted at this time      Factors Affecting Nutritional Intake: decreased appetite      Labs/Tests/Procedures/Meds     Pertinent Labs Reviewed: reviewed  Pertinent Labs Comments: BUN 25, Cr 3.8, GFR 20.3, Mg 1.5, Alk phos 211, Tbili 2.4, ALT 9, vit D 9  Pertinent Medications Reviewed: reviewed  Pertinent Medications Comments: bisacodyl, docusate, epoetin, MVI, prednisone, thiamin    Physical Findings    Overall Physical Appearance: overweight,  "edematous  Tubes:  (none)  Oral/Mouth Cavity: WDL  Skin: jaundice, incision    Anthropometrics    Temp: 98 °F (36.7 °C)     Height: 5' 7" (170.2 cm)  Weight Method: Bed Scale  Weight: 75.8 kg (167 lb 1.7 oz)  Ideal Body Weight (IBW), Male: 148 lb     % Ideal Body Weight, Male (lb): 113.36 lb     BMI (Calculated): 26.3  BMI Grade: 25 - 29.9 - overweight     Usual Body Weight (UBW), kg:  (2/2 fluid shifts)      Estimated/Assessed Needs    Weight Used For Calorie Calculations: 76.1 kg (167 lb 12.3 oz)   Height (cm): 170.2 cm  Energy Calorie Requirements (kcal): 2112  Energy Need Method: [place]-St Jeor (x1.25)      RMR ([place]-St. Jeor Equation): 1689.62      Weight Used For Protein Calculations: 76.1 kg (167 lb 12.3 oz)  Protein Requirements: 91.5- 99 g/day (1.2-1.3 g/kg)  Fluid Requirements (mL): 1 mL/kcal or per MD  Fluid Need Method: RDA Method      RDA Method (mL): 2112       Assessment and Plan    * Liver transplanted    Nutrition Diagnosis:  Increased nutrient needs    Related to (etiology):   protein for healing and muscle maintenance    Signs and Symptoms (as evidenced by):   S/p OLTx 10/19 and poor intake prior to sgy    Interventions/Recommendations (treatment strategy):  See RD recs above    Nutrition Diagnosis Status:   Continues              Monitor and Evaluation    Food and Nutrient Intake: energy intake, food and beverage intake  Food and Nutrient Adminstration: diet order  Knowledge/Beliefs/Attitudes: food and nutrition knowledge/skill  Physical Activity and Function: nutrition-related ADLs and IADLs  Anthropometric Measurements: weight, weight change  Biochemical Data, Medical Tests and Procedures: electrolyte and renal panel, gastrointestinal profile, glucose/endocrine profile, inflammatory profile, lipid profile  Nutrition-Focused Physical Findings: overall appearance, skin    Nutrition Risk    Level of Risk:  (F/U 1x/week)    Nutrition Follow-Up    RD Follow-up?: Yes    "

## 2017-11-07 NOTE — SUBJECTIVE & OBJECTIVE
Interval History:   Received HD yesterday with 2L removed. No events overnight. Doing okay this morning; ambulating with PT around hallway. No SOB. UOP 250cc yesterday.     Review of patient's allergies indicates:   Allergen Reactions    Bactrim [sulfamethoxazole-trimethoprim] Other (See Comments)     Mookie Trell Syndrome     Current Facility-Administered Medications   Medication Frequency    0.9%  NaCl infusion (for blood administration) Q24H PRN    0.9%  NaCl infusion (for blood administration) Q24H PRN    0.9%  NaCl infusion PRN    atovaquone suspension 1,500 mg Daily    bisacodyl EC tablet 10 mg Daily    bisacodyl suppository 10 mg Daily PRN    cycloSPORINE modified (NEORAL) capsule 300 mg BID    dextrose 50% injection 12.5 g PRN    dextrose 50% injection 25 g PRN    docusate sodium capsule 100 mg TID PC    epoetin maurisio injection 5,000 Units Every Mon, Wed, Fri    ergocalciferol capsule 50,000 Units Q7 Days    famotidine tablet 20 mg QHS    fluconazole tablet 200 mg Daily    glucagon (human recombinant) injection 1 mg PRN    glucose chewable tablet 16 g PRN    glucose chewable tablet 24 g PRN    heparin (porcine) injection 1,000 Units PRN    heparin (porcine) injection 5,000 Units Q8H    HYDROmorphone injection 0.5 mg Once    insulin aspart pen 0-5 Units QID (AC + HS) PRN    levETIRAcetam tablet 500 mg BID    magnesium sulfate 2g in water 50mL IVPB (premix) Once    multivitamin tablet 1 tablet Daily    mycophenolate capsule 1,000 mg BID    ondansetron disintegrating tablet 8 mg Q8H PRN    oxyCODONE-acetaminophen  mg per tablet 1 tablet Q4H PRN    oxyCODONE-acetaminophen 5-325 mg per tablet 1 tablet Q4H PRN    polyethylene glycol packet 17 g Daily    [START ON 11/11/2017] predniSONE tablet 10 mg Daily    Followed by    [START ON 11/18/2017] predniSONE tablet 5 mg Daily    Followed by    [START ON 11/25/2017] predniSONE tablet 2.5 mg Daily    predniSONE tablet 15 mg Daily     simethicone chewable tablet 80 mg TID PRN    thiamine tablet 100 mg Daily    valganciclovir 50 mg/ml oral solution 100 mg Every Mon, Wed, Fri       Objective:     Vital Signs (Most Recent):  Temp: 98 °F (36.7 °C) (11/07/17 1221)  Pulse: 103 (11/07/17 1221)  Resp: 18 (11/07/17 1221)  BP: 129/78 (11/07/17 1221)  SpO2: 99 % (11/07/17 1221)  O2 Device (Oxygen Therapy): room air (11/07/17 1221) Vital Signs (24h Range):  Temp:  [98 °F (36.7 °C)-99.3 °F (37.4 °C)] 98 °F (36.7 °C)  Pulse:  [] 103  Resp:  [18-20] 18  SpO2:  [97 %-100 %] 99 %  BP: (113-129)/(67-80) 129/78     Weight: 75.8 kg (167 lb 1.7 oz) (11/07/17 0500)  Body mass index is 26.17 kg/m².  Body surface area is 1.89 meters squared.    I/O last 3 completed shifts:  In: 1260 [P.O.:560; Blood:100; Other:600]  Out: 3400 [Urine:250; Other:3150]    Physical Exam   Constitutional: He is oriented to person, place, and time. He appears well-developed and well-nourished. No distress.   HENT:   Head: Normocephalic and atraumatic.   Eyes: Conjunctivae and EOM are normal.   Cardiovascular: Normal rate and regular rhythm.    Pulmonary/Chest: Effort normal and breath sounds normal.   Musculoskeletal: He exhibits edema. He exhibits no deformity.   Neurological: He is alert and oriented to person, place, and time.   Skin: Skin is warm and dry. He is not diaphoretic.       Significant Labs:  CBC:   Recent Labs  Lab 11/07/17 0427   WBC 5.92   RBC 2.81*   HGB 8.9*   HCT 26.1*   *   MCV 93   MCH 31.7*   MCHC 34.1     CMP:   Recent Labs  Lab 11/07/17 0427   GLU 78   CALCIUM 8.7   ALBUMIN 2.6*   PROT 5.0*      K 3.6   CO2 23      BUN 25*   CREATININE 3.8*   ALKPHOS 211*   ALT 9*   AST 12   BILITOT 2.4*     All labs within the past 24 hours have been reviewed.     Significant Imaging:  Labs: Reviewed

## 2017-11-07 NOTE — PROGRESS NOTES
[facility]  Liver Transplant  Progress Note    Patient Name: Jhonny Diana  MRN: [id number]  Admission Date: 2017  Hospital Length of Stay: 41 days  Code Status: Full Code  Primary Care Provider: Provider Notinsystem  Post-Operative Day: 19    ORGAN:   LIVER  Disease Etiology: Acute Alcoholic Hepatitis  Donor Type:    - Brain Death  CDC High Risk:   No  Donor CMV Status:   Donor CMV Status: Positive  Donor HBcAB:   Negative  Donor HCV Status:   Negative  Whole or Partial: Whole Liver  Biliary Anastomosis: End to End  Arterial Anatomy: Standard  Subjective:     History of Present Illness:  Jhonny Diana is a 28 yo male with hx of childhood asthma and heavy alcohol abuse (fifth of liquor daily since teenager, last drink 2.5 wks ago per mother) who was transferred to [facility] for higher level of care/liver transplant evaluation. Patient initially presented to OSH for a 2 mo hx of progressively worsening abdominal pain with associated fatigue, jaundice, and abdominal distention with ~20# weight gain.  Reports family hx of alcoholic cirrhosis in father. Pt was recently diagnosed with alcoholic hepatitis during his admission at OSH on -9/15. During that hospitalization, patient was given ceftriaxone for SBP ppx, prednisone for alcoholic hepatitis, and diuresed. Pt was discharged and instructed to follow up with PCP and AA within 1 wk. After his discharge, patient returned to ED 4 days after for increased weight gain and worsening abdominal pain/jaundice.     Mr. Diana was admitted to the CMICU on  for decompensated alcoholic hepatitis with high MELD score, severe HE, and LAURA. Hepatology consulted and followed to evaluate liver transplant candidacy. Pt started on albumin and lasix for LAURA and possibility for HRS. However, nephrology felt that LAURA is likely multifactorial: ATN given hypotensive episodes at OSH and toxic given severe hyperbilirubinemia; not truly HRS. Lactulose/rifaximin started  for HE and mental status slowly improved. Mr. Diana with significant ABD pain since admission with ABD distension. CT abdomen was significant for pericolonic fat stranding of right colon; no obstruction. Also with diffuse mesenteric edema. No significant ascites noted, no cholecystitis/cholelithiasis/ductal dilation. US liver with doppler showed appropriate vasculature.  He was stepped down to Hospital Medicine on 10/5.  Paracentesis was done on 10/9 with 3.3L removed and 10/12 with 1.8 removed, no infection. His diet was advanced and Dietary was consulted to help get adequate caloric intake. Listed for liver transplant 10/17 with MELD 36.  Pt received organ offer and underwent liver txp 10/19 which took place without complication.  Steroid induction used, CMV +/+.  Post operatively, LFTs/enzymes trended down nicely.  POD 5 U/S with elevated RIs and sluggish flow in HA system, otherwise ok.  Pt with LAURA prior to and s/p txp requiring dialysis.  He is tolerating HD with last HD 10/30.      Hospital Course:  Interval hx: no acute events overnight. Pt with increased abdominal pain this am, mainly on the right side. May have been related to recent opening of [employer] Cont with MWF HD as renal function has not recovered yet. Overall progressing well and wound vac placed to [employer] today. Plan for colonoscopy in am per GI. Monitor.      Scheduled Meds:   atovaquone  1,500 mg Oral Daily    bisacodyl  10 mg Oral Daily    cycloSPORINE modified  300 mg Oral BID    docusate sodium  100 mg Oral TID PC    epoetin maurisio  5,000 Units Intravenous Every Mon, Wed, Fri    ergocalciferol  50,000 Units Oral Q7 Days    famotidine  20 mg Oral QHS    fluconazole  200 mg Oral Daily    heparin (porcine)  5,000 Units Subcutaneous Q8H    HYDROmorphone  0.5 mg Intravenous Once    levETIRAcetam  500 mg Oral BID    multivitamin  1 tablet Oral Daily    mycophenolate  1,000 mg Oral BID    polyethylene glycol  17 g Oral Daily     [START ON 11/11/2017] predniSONE  10 mg Oral Daily    Followed by    [START ON 11/18/2017] predniSONE  5 mg Oral Daily    Followed by    [START ON 11/25/2017] predniSONE  2.5 mg Oral Daily    predniSONE  15 mg Oral Daily    thiamine  100 mg Oral Daily    valganciclovir 50 mg/ml  100 mg Oral Every Mon, Wed, Fri     Continuous Infusions:   PRN Meds:sodium chloride, sodium chloride, [START ON 11/8/2017] sodium chloride 0.9%, bisacodyl, dextrose 50%, dextrose 50%, glucagon (human recombinant), glucose, glucose, heparin (porcine), insulin aspart, ondansetron, oxyCODONE-acetaminophen, oxyCODONE-acetaminophen, simethicone    Review of Systems   Constitutional: Positive for activity change (decreased) and appetite change (improving). Negative for chills, diaphoresis, fatigue and fever.   HENT: Negative for congestion, mouth sores and trouble swallowing.    Eyes: Negative for visual disturbance.   Respiratory: Negative for cough, choking, shortness of breath and wheezing.    Cardiovascular: Negative for chest pain, palpitations and leg swelling.   Gastrointestinal: Positive for abdominal distention and abdominal pain. Negative for constipation, diarrhea, nausea and vomiting.   Endocrine: Negative.    Genitourinary: Negative for decreased urine volume, difficulty urinating, dysuria, hematuria and urgency.   Musculoskeletal: Negative for arthralgias, back pain and joint swelling.   Skin: Positive for wound. Negative for color change, pallor and rash.   Allergic/Immunologic: Positive for immunocompromised state.   Neurological: Positive for weakness. Negative for dizziness, tremors, seizures, syncope and headaches.   Hematological: Bruises/bleeds easily.   Psychiatric/Behavioral: Positive for sleep disturbance. Negative for agitation, confusion, decreased concentration, dysphoric mood, hallucinations and suicidal ideas. The patient is not nervous/anxious.    All other systems reviewed and are negative.    Objective:      Vital Signs (Most Recent):  Temp: 98 °F (36.7 °C) (11/07/17 1221)  Pulse: 103 (11/07/17 1221)  Resp: 18 (11/07/17 1221)  BP: 129/78 (11/07/17 1221)  SpO2: 99 % (11/07/17 1221) Vital Signs (24h Range):  Temp:  [98 °F (36.7 °C)-99.3 °F (37.4 °C)] 98 °F (36.7 °C)  Pulse:  [] 103  Resp:  [18-20] 18  SpO2:  [97 %-100 %] 99 %  BP: (113-129)/(67-80) 129/78     Weight: 75.8 kg (167 lb 1.7 oz)  Body mass index is 26.17 kg/m².    Intake/Output - Last 3 Shifts       11/05 0700 - 11/06 0659 11/06 0700 - 11/07 0659 11/07 0700 - 11/08 0659    P.O. 1050 560 619    I.V. (mL/kg) 50 (0.6)      Blood 100      Other  600     Total Intake(mL/kg) 1200 (14.9) 1160 (15.3) 619 (8.2)    Urine (mL/kg/hr) 210 (0.1) 250 (0.1) 115 (0.2)    Emesis/NG output  0 (0)     Other 1200 (0.6) 2850 (1.6)     Stool 0 (0) 0 (0)     Blood  0 (0)     Total Output 1410 3100 115    Net -210 -1940 +504           Urine Occurrence  0 x     Stool Occurrence 1 x 1 x     Emesis Occurrence  0 x           Physical Exam   Constitutional: He is oriented to person, place, and time. He appears well-developed and well-nourished. No distress.   Temporal and distal extremity muscle wasting   HENT:   Head: Normocephalic and atraumatic.   Mouth/Throat: No oropharyngeal exudate.   Eyes: EOM are normal. Pupils are equal, round, and reactive to light. Scleral icterus is present.   Neck: Normal range of motion. Neck supple.   Cardiovascular: Normal rate, regular rhythm, normal heart sounds and intact distal pulses.  Exam reveals no gallop and no friction rub.    No murmur heard.  Pulmonary/Chest: Effort normal and breath sounds normal. No respiratory distress. He has no wheezes. He has no rales. He exhibits no tenderness.   Dec R base   Abdominal: Soft. Bowel sounds are normal. He exhibits no distension. There is tenderness. There is no rebound and no guarding.       Chevron incision opened, drainage yellow/brown    Musculoskeletal: Normal range of motion. He exhibits  edema (Gen +1).   Neurological: He is alert and oriented to person, place, and time.   Skin: Skin is warm and dry. Capillary refill takes 2 to 3 seconds. He is not diaphoretic.   Psychiatric: He has a normal mood and affect. His behavior is normal. Judgment and thought content normal.   Nursing note and vitals reviewed.      Laboratory:  Immunosuppressants         Stop Route Frequency     cycloSPORINE modified (NEORAL) capsule 300 mg      -- Oral 2 times daily     mycophenolate capsule 1,000 mg      -- Oral 2 times daily        CBC:   Recent Labs  Lab 11/07/17 0427   WBC 5.92   RBC 2.81*   HGB 8.9*   HCT 26.1*   *   MCV 93   MCH 31.7*   MCHC 34.1     CMP:   Recent Labs  Lab 11/07/17 0427   GLU 78   CALCIUM 8.7   ALBUMIN 2.6*   PROT 5.0*      K 3.6   CO2 23      BUN 25*   CREATININE 3.8*   ALKPHOS 211*   ALT 9*   AST 12   BILITOT 2.4*     Coagulation: No results for input(s): INR, APTT in the last 168 hours.    Invalid input(s): PT  Labs within the past 24 hours have been reviewed.    Diagnostic Results:  I have personally reviewed all pertinent imaging studies.    Assessment/Plan:     * Liver transplanted    - LFTs/enzymes trended down nicely.  Alk phos elevated POD #14- monitor closely.   - Cont bowel regimen.  - Transitioned pain control to scheduled acetaminophen + Tramadol as hallucinations with oxycodone- tolerating well.  - Liver US POD 5 with elevated RIs and slowed flow HA system. US 10/23 technically difficult exam w difficulty visualizing arterial system and slow flow w no tardus parvus.  Repeat liver US POD# 13 showed continued sluggish flow in HA system.    - ASA started 11/2/17.  - Wound vac placed 10/29. Wd opened additional 8 cm 11/3-  veraflow wd vac placed afterwards. Entire chevron opened 11/6. Wet-to-dry dressing placed at that time. Wound care consulted and wound vac placed to site 11/7.   - Pt with increased pain to right side of inc per wound care today but base of wound  noted to be ok.            LAURA (acute kidney injury)    - Nephrology following.  Apprec recs.  - Tolerated HD 11/03/17- cont with MWF per nephrology.   - Permcath placed 10/30/17  - Set up for outpt HD  - of note, small pleural effusion, PA pressure 33.  Did NOT consult cardiology officially but s/w them per phone- they recommend cont to diurese, tele and monitor.  Consult as necessary.          Seizure    - Seizure 11/2/17 at 10am and 330pm.  Ativan NOT needed.  Seizures last 10-15 sec, started w stuttering same syllable, followed by eyes rolling back and full body convulsing.  Pt had postictal symptoms, drowsiness, tearful and sleepy for about 45 minutes. Prograf discontinued 11/2.  Neuro consulted- recommended Keppra load w 1gm IV f/b 500 mg PO bid.  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  The clinical presentation of seizures is also atypical for acute infarction.  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    - EEG 11/3/17- This likely represents a toxic metabolic encephalopathy.  There is no evidence of an epileptic process on this recording.  No seizures were recorded during this study.   - Apprec Neuro recs.  Need to get recs for when to repeat imaging.                Prophylactic immunotherapy    - Continue prednisone taper, Cellcept.  - Will monitor for signs of toxic side effects, check daily tacrolimus troughs, and change meds accordingly.  - Discontinued Prograf 11/2/17.  Cyclo started 11/5        Long-term use of immunosuppressant medication    - See prophylactic immunotherapy          At risk for opportunistic infections    - D/C Dapsone secondary to worsening anemia and continue atovaquone for PCP prophylaxis  - Continue Valcyte for CMV prophylaxis  - Continue fluconazole for fungal prophylaxis          Abnormal finding on MRI of brain    - MRI findings 11/2 following second seizure-  Focus of diffusion  restriction in the central missy. The location is somewhat atypical of infarcts from the pontine perforators.  The clinical presentation of seizures is also atypical for acute infarction.  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  Clinical correlation and short-term followup is recommended.  - stroke team consulted.  Not likely stroke.  Has signed off.  Apprec recs.  - Neurology consulted and following.  Apprec recs.    - Need recs regarding when to repeat imaging.            Anasarca    - 2/2 liver disease, LAURA, and hypoalbuminemia  - Expect improvement once kidneys improve.    - Attempt to keep dressings dry  - cont to diurese per HD.         Acute blood loss anemia    - PRBC x 2 u 10/26 and responded nicely  - h/h has trended down.  HGb this am 7.1.  Old clot removed from chevron, bleeding subsided w pressure.  Transfused 1 u PRBC w appropriate response- repeat hgb 9.2.    - plan to start Procrit MWF w HD.    -transfused 1 unit prbc 11/4 with good response        Severe malnutrition    - Dietary consulted and following.   - Continue protein powder        Physical deconditioning    - Working with PT/OT  - Pt has now improved to home health status.         Alcoholism /alcohol abuse    - Psych consulted, high risk for transplant. WILL NEED to complete ABU program s/p transplant              VTE Risk Mitigation         Ordered     heparin (porcine) injection 5,000 Units  Every 8 hours     Route:  Subcutaneous        10/30/17 0703     heparin (porcine) injection 1,000 Units  As needed (PRN)     Route:  Intra-Catheter        10/30/17 1649     Place sequential compression device  Until discontinued      10/20/17 0346     High Risk of VTE  Once      10/20/17 0346          The patients clinical status was discussed at multidisplinary rounds, involving transplant surgery, transplant medicine, pharmacy, nursing, nutrition, and social work    Discharge Planning:  Possible d/c in am.     Jin Perez,  NP  Liver Transplant  [facility]

## 2017-11-07 NOTE — PROGRESS NOTES
EDUCATION NOTE:    Met with Jhonny Diana and his caregivers to provide teaching re: immunosuppressant medications.  Reviewed medication section of the Liver Transplant Education book that was provided.  Emphasized the importance of compliance, role of the blue medication card, concerns for drug interactions, and process of obtaining refills.  Counseled regarding Prograf, Cellcept , prednisone, including directions for use, monitoring, how to handle missed doses, and side effects.  Mr. Diana and caregiver verbalized understanding and had the opportunity to ask questions.

## 2017-11-07 NOTE — PROGRESS NOTES
SW met with pt and pts mother today for continuity of care and discharge planning.   Patient presents alert and oriented x 4, engaging, communicative and asking and answering questions.  Pt also in pain, he just had wound vac placed by wound nurse right prior to social work visit.    Pt despite pain issues, denies any coping issues at this time.   He denies any depression or anxiety symptoms.  SW and patient discussed dc planning needs, while pts mom left to  key to the transplant apt at [facility].  Per LTS rounding team, pt should be ready for dc from the hospital in the next day or two.   Pt in agreement, he is ready to be out of the hospital and move on with his recovery.       Pt will require several psychosocial dc needs.  Pt is set up for HD outpatient on [facility] at 3pm, his mom has all contact information.  [facility] that pt will not dc today as previously planned, to expect pt on Friday of this week, SW will call back once pt has been dc'd.   Pt will require home health SN for wound vac care and PT at [facility], referral with [facility] (Brandy notified today that pt not being dc today).   Pt will need a RW at [facility], the mid level provider will put orders in tomorrow for dc.  Pt provided verbal permission to order through [facility].     Patient and SW discussed patient enrolling in [facility] at [facility], pt in total agreement, but SW and patient discussed when he should start enrollment process.   Pt concerned as dialysis wipes him out and he is not able to do anything after.   SW discussed with patient for the goal to be dc from hospital, stay out of hospital and reassess in two weeks after dc enrolling in [facility].   Pt in agreement.  SW will discuss with liver transplant SW team and patient's post transplant coordinator once assigned.        Pt discussing wanting to return to work, needing to find a new place to live and first fixing his STD issues.   SW provided  listening support and transplant psychosocial education with patient today.  SW remains available for all dc planning needs, psychosocial support, resources and education.

## 2017-11-07 NOTE — ASSESSMENT & PLAN NOTE
- LFTs/enzymes trended down nicely.  Alk phos elevated POD #14- monitor closely.   - Cont bowel regimen.  - Transitioned pain control to scheduled acetaminophen + Tramadol as hallucinations with oxycodone- tolerating well.  - Liver US POD 5 with elevated RIs and slowed flow HA system. US 10/23 technically difficult exam w difficulty visualizing arterial system and slow flow w no tardus parvus.  Repeat liver US POD# 13 showed continued sluggish flow in HA system.    - ASA started 11/2/17.  - Wound vac placed 10/29. Wd opened additional 8 cm 11/3-  veraflow wd vac placed afterwards. Entire chevron opened 11/6. Wet-to-dry dressing placed at that time. Wound care consulted and wound vac placed to site 11/7.   - Pt with increased pain to right side of inc per wound care today but base of wound noted to be ok.

## 2017-11-07 NOTE — PROGRESS NOTES
"Premedicated for pain prior to vac dressing application.  Wound assessed and re measured. C/o increased "stabbing pain" to right side at deeper aspect of the wound. Noted new erythema to right flank measuring 10cm x6cm . Call placed to Jin Gray NP to apprise him of this new finding during dressing change. Pt has been afebrile and WBC have not elevated.  Will discuss with staff on rounds this afternoon.       11/07/17 1100       Incision/Site 10/30/17 0911 Right abdomen lower other (see comments)   Date First Assessed/Time First Assessed: 10/30/17 0911   Present Prior to Hospital Arrival?: No  Side: Right  Location: abdomen  Orientation: lower  Incision Type: other (see comments)  Closure Method: sutures  Additional Comments: ostomy bag to old j...   Incision WDL ex   Dressing Appearance moist drainage;intact   Appearance slough;yellow;moist;pink;granulating   Periwound Area normal skin tone   Drainage Characteristics/Odor serous;yellow;no odor   Drainage Amount moderate   Wound Length (cm) 6   Wound Width (cm) 49.5   Depth (cm) 5.5   Wound Edges open   Cleansed W/ sterile normal saline   Irrigated W/ sterile normal saline   Interventions liquid bandage applied   Therapy Setting continuous therapy;125 mmHg   Pressure Setting 125 mmHg   Negative Pressure Wound Therapy Dressing foam, black   Sponges Inserted 2  (2 medium granufoams)   Dressing Change Due 11/10/17   Safety Interventions   Patient Rounds toileting offered;visualized patient     Zoë Murphy RN CWON  [id number]  "

## 2017-11-07 NOTE — ASSESSMENT & PLAN NOTE
- Nephrology following.  Apprec recs.  - Tolerated HD 11/03/17- cont with MWF per nephrology.   - Permcath placed 10/30/17  - Set up for outpt HD  - of note, small pleural effusion, PA pressure 33.  Did NOT consult cardiology officially but s/w them per phone- they recommend cont to diurese, tele and monitor.  Consult as necessary.

## 2017-11-07 NOTE — PT/OT/SLP PROGRESS
Occupational Therapy  Treatment/ POC change     Jhonny Diana   MRN: [id number]   Admitting Diagnosis: Liver transplanted    OT Date of Treatment: 11/07/17   OT Start Time: 1050  OT Stop Time: 1128  OT Total Time (min): 38 min    Billable Minutes:  Therapeutic Activity 38 minutes    General Precautions: Standard, fall  Orthopedic Precautions: N/A  Braces: N/A    Subjective:  Communicated with nurse prior to session.  Pt agreeable to skilled OT services.    Pain/Comfort  Pain Rating 1: 0/10  Pain Rating Post-Intervention 1: 0/10    Objective:  Pt received UIC.     Functional Mobility:  Bed Mobility:    Transfers:   Sit <> Stand Assistance: Supervision  Sit <> Stand Assistive Device: Rolling Walker  Bed <> Chair Technique: Stand Pivot  Bed <> Chair Transfer Assistance: Supervision  Bed <> Chair Assistive Device: No Assistive Device    Functional Ambulation: Pt ambulated at community level distances (aroudn entire unit) at supv w/ RW. Pt took one seated rest break.     Balance:   Static Sit: GOOD: Takes MODERATE challenges from all directions  Dynamic Sit: GOOD: Maintains balance through MODERATE excursions of active trunk movement  Static Stand: GOOD: Takes MODERATE challenges from all directions  Dynamic stand: GOOD: Needs SUPERVISION only during gait and able to self right with moderate     Therapeutic Activities and Exercises:  Pt educated on POC.  Writing therapist discussed pt's CLOF and needs/concerns regarding D/C from [facility].  Writing therapist educated pt on importance of BUE HEP.  Writing therapist demo'd BUE HEP exercises.  Pt demo's BUE HEP exercises indep w/ writing therapist.     AM-PAC 6 CLICK ADL   How much help from another person does this patient currently need?   1 = Unable, Total/Dependent Assistance  2 = A lot, Maximum/Moderate Assistance  3 = A little, Minimum/Contact Guard/Supervision  4 = None, Modified [place]/Independent    Putting on and taking off regular lower body clothing? :  "3  Bathing (including washing, rinsing, drying)?: 2  Toileting, which includes using toilet, bedpan, or urinal? : 3  Putting on and taking off regular upper body clothing?: 3  Taking care of personal grooming such as brushing teeth?: 4  Eating meals?: 4  Total Score: 19     AM-PAC Raw Score CMS "G-Code Modifier Level of Impairment Assistance   6 % Total / Unable   7 - 8 CM 80 - 100% Maximal Assist   9-13 CL 60 - 80% Moderate Assist   14 - 19 CK 40 - 60% Moderate Assist   20 - 22 CJ 20 - 40% Minimal Assist   23 CI 1-20% SBA / CGA   24 CH 0% Independent/ Mod I       Patient left up in chair with call button in reach and mother present    ASSESSMENT:  Jhonny Diana is a 27 y.o. male with a medical diagnosis of Liver transplanted and presents with impairments listed below. Pt is progressing well w/ therapy. Pt frequency is being reduced d/t reuqinring less need for acute OT services. Pt would benefit from skilled OT services to improve independence and overall occupational functioning.    Rehab identified problem list/impairments: Rehab identified problem list/impairments: weakness, impaired functional mobilty, gait instability    Rehab potential is good.    Activity tolerance: Good    Discharge recommendations: Discharge Facility/Level Of Care Needs: home with home health     Barriers to discharge: Barriers to Discharge: None    Equipment recommendations: walker, rolling     GOALS:    Occupational Therapy Goals        Problem: Occupational Therapy Goal    Goal Priority Disciplines Outcome Interventions   Occupational Therapy Goal     OT, PT/OT Ongoing (interventions implemented as appropriate)    Description:  Goals to be met by:  11/10/17    Patient will increase functional independence with ADLs by performing:    UE Dressing with Minimal Assistance. MET (10/29/2017)  LE Dressing with Moderate Assistance with AD as needed. - MET 10/31  Revised: LE dressing with Set-up assistance and SBA  Grooming while standing " at sink with Minimal Assistance.  Toileting from toilet with Minimal Assistance for hygiene and clothing management.   Supine to sit with Moderate Assistance.  Toilet transfer to toilet with Minimal Assistance.  Upper extremity exercise program per handout, with independence.                                     Plan:  Patient to be seen 3 x/week to address the above listed problems via self-care/home management, therapeutic activities, therapeutic exercises  Plan of Care expires: 11/26/17  Plan of Care reviewed with: patient, mother    Uche Elias, OT  11/07/2017

## 2017-11-07 NOTE — PLAN OF CARE
Problem: Occupational Therapy Goal  Goal: Occupational Therapy Goal  Goals to be met by:  11/10/17    Patient will increase functional independence with ADLs by performing:    UE Dressing with Minimal Assistance. MET (10/29/2017)  LE Dressing with Moderate Assistance with AD as needed. - MET 10/31  Revised: LE dressing with Set-up assistance and SBA  Grooming while standing at sink with Minimal Assistance.  Toileting from toilet with Minimal Assistance for hygiene and clothing management.   Supine to sit with Moderate Assistance.  Toilet transfer to toilet with Minimal Assistance.  Upper extremity exercise program per handout, with independence.                    Continue OT POC    Comments: Uche Elias OTR/L  11/7/2017

## 2017-11-07 NOTE — ASSESSMENT & PLAN NOTE
- likely ischemic ATN from decreased renal perfusion (volume shifts from paracentesis, anemia). Urine sediment with muddy brown casts  - underwent OLTx on 10/19; received intraop CRRT  - remains dialysis dependent at this time  - HD yesterday with UF 2L  - outpatient dialysis chair arranged at [facility] 3pm  - next HD planned for tomorrow; either inpatient or outpatient  - currently oliguric; anticipating renal recovery. Might still need a few weeks of HD though.

## 2017-11-07 NOTE — PROGRESS NOTES
Telemetry tech notified RN that the pt was not on telemetry despite telemetry order. Per day shift RN the telemetry order was dc'ed the afternoon of 11/6 (verified by patient). No telemetry box connected to pt during PM assessment. Fransisca TEJADA, notified. Order given to remove existing order for telemetry.

## 2017-11-07 NOTE — ASSESSMENT & PLAN NOTE
- MRI findings 11/2 following second seizure-  Focus of diffusion restriction in the central missy. The location is somewhat atypical of infarcts from the pontine perforators.  The clinical presentation of seizures is also atypical for acute infarction.  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  Clinical correlation and short-term followup is recommended.  - stroke team consulted.  Not likely stroke.  Has signed off.  Apprec recs.  - Neurology consulted and following.  Apprec recs.    - Need recs regarding when to repeat imaging.

## 2017-11-07 NOTE — SUBJECTIVE & OBJECTIVE
Scheduled Meds:   atovaquone  1,500 mg Oral Daily    bisacodyl  10 mg Oral Daily    cycloSPORINE modified  300 mg Oral BID    docusate sodium  100 mg Oral TID PC    epoetin maurisio  5,000 Units Intravenous Every Mon, Wed, Fri    ergocalciferol  50,000 Units Oral Q7 Days    famotidine  20 mg Oral QHS    fluconazole  200 mg Oral Daily    heparin (porcine)  5,000 Units Subcutaneous Q8H    HYDROmorphone  0.5 mg Intravenous Once    levETIRAcetam  500 mg Oral BID    multivitamin  1 tablet Oral Daily    mycophenolate  1,000 mg Oral BID    polyethylene glycol  17 g Oral Daily    [START ON 11/11/2017] predniSONE  10 mg Oral Daily    Followed by    [START ON 11/18/2017] predniSONE  5 mg Oral Daily    Followed by    [START ON 11/25/2017] predniSONE  2.5 mg Oral Daily    predniSONE  15 mg Oral Daily    thiamine  100 mg Oral Daily    valganciclovir 50 mg/ml  100 mg Oral Every Mon, Wed, Fri     Continuous Infusions:   PRN Meds:sodium chloride, sodium chloride, [START ON 11/8/2017] sodium chloride 0.9%, bisacodyl, dextrose 50%, dextrose 50%, glucagon (human recombinant), glucose, glucose, heparin (porcine), insulin aspart, ondansetron, oxyCODONE-acetaminophen, oxyCODONE-acetaminophen, simethicone    Review of Systems   Constitutional: Positive for activity change (decreased) and appetite change (improving). Negative for chills, diaphoresis, fatigue and fever.   HENT: Negative for congestion, mouth sores and trouble swallowing.    Eyes: Negative for visual disturbance.   Respiratory: Negative for cough, choking, shortness of breath and wheezing.    Cardiovascular: Negative for chest pain, palpitations and leg swelling.   Gastrointestinal: Positive for abdominal distention and abdominal pain. Negative for constipation, diarrhea, nausea and vomiting.   Endocrine: Negative.    Genitourinary: Negative for decreased urine volume, difficulty urinating, dysuria, hematuria and urgency.   Musculoskeletal: Negative for  arthralgias, back pain and joint swelling.   Skin: Positive for wound. Negative for color change, pallor and rash.   Allergic/Immunologic: Positive for immunocompromised state.   Neurological: Positive for weakness. Negative for dizziness, tremors, seizures, syncope and headaches.   Hematological: Bruises/bleeds easily.   Psychiatric/Behavioral: Positive for sleep disturbance. Negative for agitation, confusion, decreased concentration, dysphoric mood, hallucinations and suicidal ideas. The patient is not nervous/anxious.    All other systems reviewed and are negative.    Objective:     Vital Signs (Most Recent):  Temp: 98 °F (36.7 °C) (11/07/17 1221)  Pulse: 103 (11/07/17 1221)  Resp: 18 (11/07/17 1221)  BP: 129/78 (11/07/17 1221)  SpO2: 99 % (11/07/17 1221) Vital Signs (24h Range):  Temp:  [98 °F (36.7 °C)-99.3 °F (37.4 °C)] 98 °F (36.7 °C)  Pulse:  [] 103  Resp:  [18-20] 18  SpO2:  [97 %-100 %] 99 %  BP: (113-129)/(67-80) 129/78     Weight: 75.8 kg (167 lb 1.7 oz)  Body mass index is 26.17 kg/m².    Intake/Output - Last 3 Shifts       11/05 0700 - 11/06 0659 11/06 0700 - 11/07 0659 11/07 0700 - 11/08 0659    P.O. 1050 560 619    I.V. (mL/kg) 50 (0.6)      Blood 100      Other  600     Total Intake(mL/kg) 1200 (14.9) 1160 (15.3) 619 (8.2)    Urine (mL/kg/hr) 210 (0.1) 250 (0.1) 115 (0.2)    Emesis/NG output  0 (0)     Other 1200 (0.6) 2850 (1.6)     Stool 0 (0) 0 (0)     Blood  0 (0)     Total Output 1410 3100 115    Net -210 -1940 +504           Urine Occurrence  0 x     Stool Occurrence 1 x 1 x     Emesis Occurrence  0 x           Physical Exam   Constitutional: He is oriented to person, place, and time. He appears well-developed and well-nourished. No distress.   Temporal and distal extremity muscle wasting   HENT:   Head: Normocephalic and atraumatic.   Mouth/Throat: No oropharyngeal exudate.   Eyes: EOM are normal. Pupils are equal, round, and reactive to light. Scleral icterus is present.   Neck: Normal  range of motion. Neck supple.   Cardiovascular: Normal rate, regular rhythm, normal heart sounds and intact distal pulses.  Exam reveals no gallop and no friction rub.    No murmur heard.  Pulmonary/Chest: Effort normal and breath sounds normal. No respiratory distress. He has no wheezes. He has no rales. He exhibits no tenderness.   Dec R base   Abdominal: Soft. Bowel sounds are normal. He exhibits no distension. There is tenderness. There is no rebound and no guarding.       Chevron incision opened, drainage yellow/brown    Musculoskeletal: Normal range of motion. He exhibits edema (Gen +1).   Neurological: He is alert and oriented to person, place, and time.   Skin: Skin is warm and dry. Capillary refill takes 2 to 3 seconds. He is not diaphoretic.   Psychiatric: He has a normal mood and affect. His behavior is normal. Judgment and thought content normal.   Nursing note and vitals reviewed.      Laboratory:  Immunosuppressants         Stop Route Frequency     cycloSPORINE modified (NEORAL) capsule 300 mg      -- Oral 2 times daily     mycophenolate capsule 1,000 mg      -- Oral 2 times daily        CBC:   Recent Labs  Lab 11/07/17 0427   WBC 5.92   RBC 2.81*   HGB 8.9*   HCT 26.1*   *   MCV 93   MCH 31.7*   MCHC 34.1     CMP:   Recent Labs  Lab 11/07/17 0427   GLU 78   CALCIUM 8.7   ALBUMIN 2.6*   PROT 5.0*      K 3.6   CO2 23      BUN 25*   CREATININE 3.8*   ALKPHOS 211*   ALT 9*   AST 12   BILITOT 2.4*     Coagulation: No results for input(s): INR, APTT in the last 168 hours.    Invalid input(s): PT  Labs within the past 24 hours have been reviewed.    Diagnostic Results:  I have personally reviewed all pertinent imaging studies.

## 2017-11-08 VITALS
OXYGEN SATURATION: 97 % | DIASTOLIC BLOOD PRESSURE: 73 MMHG | SYSTOLIC BLOOD PRESSURE: 123 MMHG | BODY MASS INDEX: 26.19 KG/M2 | RESPIRATION RATE: 18 BRPM | TEMPERATURE: 99 F | HEART RATE: 96 BPM | HEIGHT: 67 IN | WEIGHT: 166.88 LBS

## 2017-11-08 DIAGNOSIS — Z94.4 LIVER REPLACED BY TRANSPLANT: Primary | ICD-10-CM

## 2017-11-08 PROBLEM — D62 ACUTE BLOOD LOSS ANEMIA: Status: RESOLVED | Noted: 2017-10-16 | Resolved: 2017-11-08

## 2017-11-08 LAB
ALBUMIN SERPL BCP-MCNC: 2.5 G/DL
ALP SERPL-CCNC: 192 U/L
ALT SERPL W/O P-5'-P-CCNC: 5 U/L
ANION GAP SERPL CALC-SCNC: 15 MMOL/L
AST SERPL-CCNC: 12 U/L
BASOPHILS # BLD AUTO: 0.04 K/UL
BASOPHILS NFR BLD: 0.6 %
BILIRUB SERPL-MCNC: 2.4 MG/DL
BUN SERPL-MCNC: 29 MG/DL
CALCIUM SERPL-MCNC: 8.9 MG/DL
CHLORIDE SERPL-SCNC: 103 MMOL/L
CO2 SERPL-SCNC: 19 MMOL/L
CREAT SERPL-MCNC: 4.7 MG/DL
CYCLOSPORINE BLD LC/MS/MS-MCNC: 362 NG/ML
DIFFERENTIAL METHOD: ABNORMAL
EOSINOPHIL # BLD AUTO: 0 K/UL
EOSINOPHIL NFR BLD: 0 %
ERYTHROCYTE [DISTWIDTH] IN BLOOD BY AUTOMATED COUNT: 19 %
EST. GFR  (AFRICAN AMERICAN): 18.2 ML/MIN/1.73 M^2
EST. GFR  (NON AFRICAN AMERICAN): 15.7 ML/MIN/1.73 M^2
GLUCOSE SERPL-MCNC: 81 MG/DL
HCT VFR BLD AUTO: 25.7 %
HGB BLD-MCNC: 8.4 G/DL
IMM GRANULOCYTES # BLD AUTO: 0.03 K/UL
IMM GRANULOCYTES NFR BLD AUTO: 0.4 %
LYMPHOCYTES # BLD AUTO: 1.3 K/UL
LYMPHOCYTES NFR BLD: 18.6 %
MAGNESIUM SERPL-MCNC: 1.7 MG/DL
MCH RBC QN AUTO: 30.7 PG
MCHC RBC AUTO-ENTMCNC: 32.7 G/DL
MCV RBC AUTO: 94 FL
MONOCYTES # BLD AUTO: 0.7 K/UL
MONOCYTES NFR BLD: 9.9 %
NEUTROPHILS # BLD AUTO: 4.8 K/UL
NEUTROPHILS NFR BLD: 70.5 %
NRBC BLD-RTO: 0 /100 WBC
PHOSPHATE SERPL-MCNC: 3.7 MG/DL
PLATELET # BLD AUTO: 176 K/UL
PMV BLD AUTO: 10.5 FL
POTASSIUM SERPL-SCNC: 3.8 MMOL/L
PROT SERPL-MCNC: 4.9 G/DL
RBC # BLD AUTO: 2.74 M/UL
SODIUM SERPL-SCNC: 137 MMOL/L
WBC # BLD AUTO: 6.76 K/UL

## 2017-11-08 PROCEDURE — 63600175 PHARM REV CODE 636 W HCPCS: Performed by: NURSE PRACTITIONER

## 2017-11-08 PROCEDURE — 85025 COMPLETE CBC W/AUTO DIFF WBC: CPT

## 2017-11-08 PROCEDURE — 36415 COLL VENOUS BLD VENIPUNCTURE: CPT

## 2017-11-08 PROCEDURE — 63600175 PHARM REV CODE 636 W HCPCS: Performed by: STUDENT IN AN ORGANIZED HEALTH CARE EDUCATION/TRAINING PROGRAM

## 2017-11-08 PROCEDURE — 63600175 PHARM REV CODE 636 W HCPCS: Performed by: PHYSICIAN ASSISTANT

## 2017-11-08 PROCEDURE — 80158 DRUG ASSAY CYCLOSPORINE: CPT

## 2017-11-08 PROCEDURE — 25000003 PHARM REV CODE 250: Performed by: PHYSICIAN ASSISTANT

## 2017-11-08 PROCEDURE — 25000003 PHARM REV CODE 250: Performed by: STUDENT IN AN ORGANIZED HEALTH CARE EDUCATION/TRAINING PROGRAM

## 2017-11-08 PROCEDURE — 83735 ASSAY OF MAGNESIUM: CPT

## 2017-11-08 PROCEDURE — 84100 ASSAY OF PHOSPHORUS: CPT

## 2017-11-08 PROCEDURE — 80053 COMPREHEN METABOLIC PANEL: CPT

## 2017-11-08 PROCEDURE — 63600175 PHARM REV CODE 636 W HCPCS: Performed by: INTERNAL MEDICINE

## 2017-11-08 PROCEDURE — 25000003 PHARM REV CODE 250: Performed by: NURSE PRACTITIONER

## 2017-11-08 PROCEDURE — 90935 HEMODIALYSIS ONE EVALUATION: CPT

## 2017-11-08 PROCEDURE — 99239 HOSP IP/OBS DSCHRG MGMT >30: CPT | Mod: 24,,, | Performed by: PHYSICIAN ASSISTANT

## 2017-11-08 PROCEDURE — 25000003 PHARM REV CODE 250: Performed by: INTERNAL MEDICINE

## 2017-11-08 RX ORDER — PANTOPRAZOLE SODIUM 40 MG/1
40 TABLET, DELAYED RELEASE ORAL DAILY
Qty: 30 TABLET | Refills: 5 | Status: ON HOLD | OUTPATIENT
Start: 2017-11-08 | End: 2018-04-09 | Stop reason: HOSPADM

## 2017-11-08 RX ORDER — OXYCODONE AND ACETAMINOPHEN 10; 325 MG/1; MG/1
.5-1 TABLET ORAL EVERY 4 HOURS PRN
Qty: 40 TABLET | Refills: 0 | Status: SHIPPED | OUTPATIENT
Start: 2017-11-08 | End: 2017-11-14 | Stop reason: SDUPTHER

## 2017-11-08 RX ORDER — OXYCODONE AND ACETAMINOPHEN 10; 325 MG/1; MG/1
.5-1 TABLET ORAL EVERY 4 HOURS PRN
Qty: 40 TABLET | Refills: 0 | Status: CANCELLED | OUTPATIENT
Start: 2017-11-08

## 2017-11-08 RX ORDER — PREDNISONE 10 MG/1
TABLET ORAL
Qty: 60 TABLET | Refills: 0 | Status: ON HOLD | OUTPATIENT
Start: 2017-11-08 | End: 2017-12-20 | Stop reason: ALTCHOICE

## 2017-11-08 RX ORDER — ASPIRIN 81 MG/1
81 TABLET ORAL DAILY
Qty: 30 TABLET | Refills: 5 | Status: ON HOLD | COMMUNITY
Start: 2017-11-08 | End: 2018-04-10 | Stop reason: HOSPADM

## 2017-11-08 RX ORDER — HYDROGEN PEROXIDE 3 %
20 SOLUTION, NON-ORAL MISCELLANEOUS
Qty: 30 CAPSULE | Refills: 5 | Status: SHIPPED | OUTPATIENT
Start: 2017-11-08 | End: 2017-11-08 | Stop reason: ALTCHOICE

## 2017-11-08 RX ORDER — CYCLOSPORINE 25 MG/1
50 CAPSULE, LIQUID FILLED ORAL 2 TIMES DAILY
Qty: 120 CAPSULE | Refills: 11 | Status: SHIPPED | OUTPATIENT
Start: 2017-11-08 | End: 2017-11-09 | Stop reason: DRUGHIGH

## 2017-11-08 RX ORDER — CYCLOSPORINE 100 MG/1
200 CAPSULE, LIQUID FILLED ORAL 2 TIMES DAILY
Qty: 120 CAPSULE | Refills: 11 | Status: ON HOLD | OUTPATIENT
Start: 2017-11-08 | End: 2017-11-30

## 2017-11-08 RX ORDER — ASPIRIN 81 MG/1
81 TABLET ORAL DAILY
Status: DISCONTINUED | OUTPATIENT
Start: 2017-11-08 | End: 2017-11-08 | Stop reason: HOSPADM

## 2017-11-08 RX ADMIN — CYCLOSPORINE 300 MG: 100 CAPSULE, LIQUID FILLED ORAL at 05:11

## 2017-11-08 RX ADMIN — DOCUSATE SODIUM 100 MG: 100 CAPSULE, LIQUID FILLED ORAL at 04:11

## 2017-11-08 RX ADMIN — ERYTHROPOIETIN 5000 UNITS: 10000 INJECTION, SOLUTION INTRAVENOUS; SUBCUTANEOUS at 11:11

## 2017-11-08 RX ADMIN — FLUCONAZOLE 200 MG: 200 TABLET ORAL at 02:11

## 2017-11-08 RX ADMIN — MYCOPHENOLATE MOFETIL 1000 MG: 250 CAPSULE ORAL at 05:11

## 2017-11-08 RX ADMIN — LEVETIRACETAM 500 MG: 500 TABLET ORAL at 05:11

## 2017-11-08 RX ADMIN — MORPHINE 100 MG: 10 SOLUTION ORAL at 02:11

## 2017-11-08 RX ADMIN — ATOVAQUONE 1500 MG: 750 SUSPENSION ORAL at 02:11

## 2017-11-08 RX ADMIN — LEVETIRACETAM 500 MG: 500 TABLET ORAL at 02:11

## 2017-11-08 RX ADMIN — THIAMINE HCL (VITAMIN B1) 50 MG TABLET 100 MG: at 02:11

## 2017-11-08 RX ADMIN — HEPARIN SODIUM 1000 UNITS: 1000 INJECTION, SOLUTION INTRAVENOUS; SUBCUTANEOUS at 03:11

## 2017-11-08 RX ADMIN — HEPARIN SODIUM 5000 UNITS: 5000 INJECTION, SOLUTION INTRAVENOUS; SUBCUTANEOUS at 05:11

## 2017-11-08 RX ADMIN — PREDNISONE 15 MG: 5 TABLET ORAL at 05:11

## 2017-11-08 RX ADMIN — SODIUM CHLORIDE 300 ML: 0.9 INJECTION, SOLUTION INTRAVENOUS at 03:11

## 2017-11-08 RX ADMIN — DOCUSATE SODIUM 100 MG: 100 CAPSULE, LIQUID FILLED ORAL at 02:11

## 2017-11-08 RX ADMIN — OXYCODONE AND ACETAMINOPHEN 1 TABLET: 10; 325 TABLET ORAL at 05:11

## 2017-11-08 RX ADMIN — BISACODYL 10 MG: 5 TABLET, COATED ORAL at 02:11

## 2017-11-08 RX ADMIN — OXYCODONE AND ACETAMINOPHEN 1 TABLET: 10; 325 TABLET ORAL at 02:11

## 2017-11-08 RX ADMIN — OXYCODONE AND ACETAMINOPHEN 1 TABLET: 10; 325 TABLET ORAL at 10:11

## 2017-11-08 RX ADMIN — OXYCODONE AND ACETAMINOPHEN 1 TABLET: 10; 325 TABLET ORAL at 12:11

## 2017-11-08 RX ADMIN — ASPIRIN 81 MG: 81 TABLET, COATED ORAL at 02:11

## 2017-11-08 RX ADMIN — THERA TABS 1 TABLET: TAB at 02:11

## 2017-11-08 NOTE — PT/OT/SLP PROGRESS
"Physical Therapy  Treatment    Jhonny Diana   MRN: [id number]   Admitting Diagnosis: Liver replaced by transplant    PT Received On: 11/07/17  PT Start Time: 1656     PT Stop Time: 1748    PT Total Time (min): 52 min       Billable Minutes:  Gait Training 38 and Therapeutic Exercise 14    Treatment Type: Treatment  PT/PTA: PT     PTA Visit Number: 1       General Precautions: Standard, fall  Orthopedic Precautions: N/A   Braces: N/A    Do you have any cultural, spiritual, Confucianist conflicts, given your current situation?: none reported     Subjective:  Communicated with nursing prior to session.  "I have no problems with walking."    Pain/Comfort  Pain Rating 1: 7/10  Location 1: abdomen    Objective:   Patient found with: wound vac, peripheral IV (Hemodialysis catheter)    Functional Mobility:  Bed Mobility:   Scooting/Bridging: Modified Independent  Supine to Sit: Stand by Assistance  Sit to Supine: Contact Guard Assistance (with ed on hand placement, and use of bed rail to roll trunk)    Transfers:  Sit <> Stand Assistance: Independent  Sit <> Stand Assistive Device: No Assistive Device  Bed <> Chair Technique: Stand Pivot  Bed <> Chair Assistance: Independent  Bed <> Chair Assistive Device:  (use of IV pole)    Gait:   Gait Distance: Pt amb 800', with 1 standing rest break  Assistance 1: Independent  Gait Assistive Device:  (Use of IV pole)  Gait Pattern: reciprocal  Gait Deviation(s): decreased [place]    Stairs:  Pt ascended/descend 3 stair(s) with No Assistive Device with right with Minimal Assistance.     Balance:   Static Sit: NORMAL: No deviations seen in posture held statically  Dynamic Sit: NORMAL: No deviations seen in posture held dynamically  Static Stand: FAIR+: Takes MINIMAL challenges from all directions  Dynamic stand: GOOD+: Independent gait (with or without assistive device)     Therapeutic Activities and Exercises:  Whiteboard updated  Ankle pumps: 20 reps   LAQs: 20 reps each LE perf " individually, in sit  Hip abd/add: 20 reps each LE perf individually, in sit  Hip flex: 20 reps each LE perf individually, in sit  Sit-ups: 20 reps    AM-PAC 6 CLICK MOBILITY  How much help from another person does this patient currently need?   1 = Unable, Total/Dependent Assistance  2 = A lot, Maximum/Moderate Assistance  3 = A little, Minimum/Contact Guard/Supervision  4 = None, Modified [place]/Independent    Turning over in bed (including adjusting bedclothes, sheets and blankets)?: 3  Sitting down on and standing up from a chair with arms (e.g., wheelchair, bedside commode, etc.): 4  Moving from lying on back to sitting on the side of the bed?: 3  Moving to and from a bed to a chair (including a wheelchair)?: 4  Need to walk in hospital room?: 4  Climbing 3-5 steps with a railing?: 3  Total Score: 21    AM-PAC Raw Score CMS G-Code Modifier Level of Impairment Assistance   6 % Total / Unable   7 - 9 CM 80 - 100% Maximal Assist   10 - 14 CL 60 - 80% Moderate Assist   15 - 19 CK 40 - 60% Moderate Assist   20 - 22 CJ 20 - 40% Minimal Assist   23 CI 1-20% SBA / CGA   24 CH 0% Independent/ Mod I     Patient left up in chair with all lines intact, call button in reach, nursing notified and mother present.    Assessment:  Jhonny Diana is a 27 y.o. male with a medical diagnosis of Liver replaced by transplant.  Pt was able to amb long distances in the hallway.  Does not require assistance with gait with an AD in use.  Bed mobility was assessed, currently able to perf without physical assistance, ed on techniques to assist elevating trunk with supine-->sit.  Pt will cont to benefit from skilled PT services to improve functional transfers and gait.      Rehab identified problem list/impairments: Rehab identified problem list/impairments: weakness, decreased lower extremity function, pain, gait instability, impaired functional mobilty    Rehab potential is fair.    Activity tolerance: Fair    Discharge  recommendations: Discharge Facility/Level Of Care Needs: home health PT     Barriers to discharge: Barriers to Discharge: None    Equipment recommendations: Equipment Needed After Discharge: walker, rolling     GOALS:    Physical Therapy Goals        Problem: Physical Therapy Goal    Goal Priority Disciplines Outcome Goal Variances Interventions   Physical Therapy Goal     PT/OT, PT Ongoing (interventions implemented as appropriate)     Description:  Goals to be met by: 2017    Patient will increase functional independence with mobility by performin. Supine to sit with CGA. - GOAL MET  2. Sit to supine with CGA - GOAL MET  3. Sit to stand transfer with SBA using LRAD or no Ad. - GOAL MET  4. Bed to chair transfer with SBA using LRAD  - GOAL MET  5. Gait x150 feet with SBA using LRAD or no Ad. - GOAL MET  6. Ascend/descend 4 stair with bilateral Handrails with CGA.  7. Lower extremity exercise program x20 reps per handout, with independence.  8.  Pt to perf car transfer simulation with CGA.                                 PLAN:    Patient to be seen 5 x/week  to address the above listed problems via gait training, therapeutic activities, therapeutic exercises, neuromuscular re-education  Plan of Care expires: 17  Plan of Care reviewed with: patient, mother         Tory Amato, PT  2017

## 2017-11-08 NOTE — PT/OT/SLP PROGRESS
Physical Therapy      Jhonny Diana  MRN: [id number]    Patient not seen today secondary to dialysis from approximately 0930-230. PT unable to return. Will follow-up tomorrow.    Charline Zavala, PT   11/8/2017

## 2017-11-08 NOTE — PLAN OF CARE
Problem: Patient Care Overview  Goal: Plan of Care Review  Pt aaox4 vswnl and c/o pain to wound with moderate relief after pain meds given. Mother at bedside. Bed in low position and callbell within reach. Wound vac to chevron incision intact. Pt ambulates with 1 assist and walker. Standard precautions and reverse isolation maintained. Pt planned for dialysis in am and then d/c.

## 2017-11-08 NOTE — PROGRESS NOTES
Patient arrived on unit via stretcher. Report received from Emmanuel Wagoner. Standing weight obtained 78 kg. Acute dialysis initiated via right IJ CVC. Ports aspirated and flushed without difficulty. Lines coneected and secured. Good blood flow established.

## 2017-11-08 NOTE — NURSING
To acute dialysis per stretcher, phone report given earlier to Jacquelyn SHEEHAN in acute dialysis.

## 2017-11-08 NOTE — PROGRESS NOTES
Discharge Note:    EZEKIEL met with pt's mom this morning as patient in dialysis, to discuss dc plans with patient.   Pt and pts mom in agreement with dc plans for today.   Pt will dc to local transplant lodging at [address] under the care of his mom Sanjuanita Diana, [phone].   Pt is set up for outpatient HD at [facility] MWF 3pm, with pt arriving earlier on Friday for his first treatment, pts mom verbalized understanding.  EZEKIEL lvm for [place] at [facility] ([phone])  that pt will start on Friday.  Pt has his home wound vac from [facility] in hospital room, bedside nurse will change to home vac prior to dc.   Pt has home health set up with [facility] for SN wound care and PT.   Referral sent to [facility] for RW and BSC today per pt request.  EZEKIEL confirmed with Sarahi at [facility] that pt will dc today, to ensure delivery of equipment to pts hospital room today.  Pt will also need to enroll in [facility] per his choice in the next few weeks, provided he will be able to make all of the program's commitments.   Pts mom reports she plans to call EZEKIEL [place] at the program that pt is being dc'd today.  Pt's mom reports both she and patient coping appropriately at this time.   Pts mom with no further psychosocial concerns at this time.  Transplant SW remains available for all transplant resources, education and psychosocial support.

## 2017-11-08 NOTE — PLAN OF CARE
Problem: Physical Therapy Goal  Goal: Physical Therapy Goal  Goals to be met by: 2017    Patient will increase functional independence with mobility by performin. Supine to sit with CGA. - GOAL MET  2. Sit to supine with CGA - GOAL MET  3. Sit to stand transfer with SBA using LRAD or no Ad. - GOAL MET  4. Bed to chair transfer with SBA using LRAD  - GOAL MET  5. Gait x150 feet with SBA using LRAD or no Ad. - GOAL MET  6. Ascend/descend 4 stair with bilateral Handrails with CGA.  7. Lower extremity exercise program x20 reps per handout, with independence.  8.  Pt to perf car transfer simulation with CGA.               Outcome: Ongoing (interventions implemented as appropriate)  Pt achieved 5/8 goals.

## 2017-11-08 NOTE — PROGRESS NOTES
Dialysis completed. 3 hours, 1.5 liters removed. Ports locked with heparin, capped and taped. Pt tolerated tx well. Report called to Emmanuel SHEEHAN.

## 2017-11-08 NOTE — DISCHARGE SUMMARY
[facility]  Liver Transplant  Discharge Summary      Patient Name: Jhonny Diana  MRN: [id number]  Admission Date: 2017  Hospital Length of Stay: 42 days  Discharge Date and Time:  2017 2:17 PM  Attending Physician: Solis Chapa MD   Discharging Provider: Jenna M Oppenheimer, PA-C  Primary Care Provider: Provider Notinsystem  Post-Operative Day: 20     ORGAN:   LIVER  Disease Etiology: Acute Alcoholic Hepatitis  Donor Type:    - Brain Death  CDC High Risk:   No  Donor CMV Status:   Donor CMV Status: Positive  Donor HBcAB:   Negative  Donor HCV Status:   Negative  Whole or Partial: Whole Liver  Biliary Anastomosis: End to End  Arterial Anatomy: Standard    HPI: Jhonny Diana is a 26 yo male with 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 Per pt's mother, pt had been drinking a fifth of liquordaily since he was a teenager, last drink 2.5 wks ago. His father has a hx of alcoholic cirrhosis. He was treated with prednisone for alcoholic hepatitis at the time, duiresed and given rocephin for SBP. Pt discharged home and was admitted to [facility] 4 days later () for decompensated alcoholic hepatitis with high MELD score, severe HE, and LAURA. Hepatology consulted and followed to evaluate liver transplant candidacy. Pt started on albumin and lasix for LAURA and possibility for HRS. However, nephrology felt that LAURA is likely multifactorial: ATN given hypotensive episodes at OSH and toxic given severe hyperbilirubinemia; not truly HRS. Lactulose/rifaximin started for HE and mental status slowly improved. Mr. Diana with significant ABD pain since admission with ABD distension. CT abdomen was significant for pericolonic fat stranding of right colon; no obstruction. Also with diffuse mesenteric edema. No significant ascites noted, no cholecystitis/cholelithiasis/ductal dilation. US liver with doppler  showed appropriate vasculature.  He was stepped down to Hospital Medicine on 10/5.  Paracentesis was done on 10/9 with 3.3L removed and 10/12 with 1.8 removed, no infection. His diet was advanced and Dietary was consulted to help get adequate caloric intake. Listed for liver transplant 10/17 with MELD 36.    Pt received organ offer and underwent liver txp 10/19 which took place without complication.  Steroid induction used, CMV +/+.  Post operatively, LFTs/enzymes trended down nicely. Bilirubin overall trending down was 31 the day of txp. POD 5 U/S with elevated RIs and sluggish flow in HA system, otherwise ok. Repeat US 11/1 again with sluggish flow and 3 small fluid collections.     Pt with LAURA prior to and s/p txp requiring dialysis.  He is tolerating HD with last HD 11/8. Pt is making ~200cc urine daily. Will cont MWF HD and monitor UOP.        Pt with drainage from chevron incision, opened on R side and wound vac placed 10/29. Chevron opened an additional 8cm 11/3, appeared to be draining pus at the time. Entire chevron opened 11/6 as was noted to have pus draining from top portion and with tracking along entire length of incision. Wound vac placed.     Pt found to have seizure 11/2. Neuro was consulted and did not recommend starting AEDs at the time. Pt seized again the same day and therefore was loaded with Keppra per neuro.  Prograf was held at the time. MRI 11/2 showed atypical findings ?Stroke vs CPM- no PRES. Stroke team consulted and not likely a stroke- signed off 11/3. 2D echo at the time neg for vegetation to r/o emboli. Per neuro likely multifactorial in the setting of liver transplant, immunosuppression and metabolic derangements. Follow up EEG with findings suggesting mild-mod encephalopathy unknown cause. Prograf remained on hold and pt was started on cyclo 11/5 without complication. Pt to remain on keppa and follow up with Dr Norma Duvall (Neurology) as an out patient    Pt is overall doing  well and is ready for discharge. Aspirin started today. Pt with follow up with home health PT/OT and wound care. Will have labs tomorrow.              Final Active Diagnoses:    Diagnosis Date Noted POA    PRINCIPAL PROBLEM:  Liver replaced by transplant [Z94.4] 10/20/2017 Not Applicable     Chronic    Anasarca [R60.1] 09/28/2017 Yes    LAURA (acute kidney injury) [N17.9] 10/13/2017 Yes    Seizure [R56.9] 11/02/2017 No    Abnormal finding on MRI of brain [R90.89] 11/02/2017 No    Long-term use of immunosuppressant medication [Z79.899] 10/20/2017 Not Applicable     Chronic    Acute blood loss anemia [D62] 10/16/2017 No    Alcoholism /alcohol abuse [F10.20] 09/28/2017 Yes    At risk for opportunistic infections [Z91.89] 10/26/2017 No    Prophylactic immunotherapy [Z29.8] 10/20/2017 Not Applicable     Chronic    Severe malnutrition [E43] 10/04/2017 Yes    Physical deconditioning [R53.81] 10/26/2017 Yes    Alcoholic hepatitis without ascites [K70.10] 10/24/2017 Yes      Problems Resolved During this Admission:    Diagnosis Date Noted Date Resolved POA    Immunosuppression [D89.9] 11/03/2017 11/03/2017 No    Alcohol use disorder [F10.99] 11/02/2017 11/03/2017 Yes    Hyperglycemia [R73.9] 10/21/2017 10/26/2017 No    Decompensated hepatic cirrhosis [K72.90]  10/20/2017 Unknown    Increased anion gap metabolic acidosis [E87.2]  10/20/2017 Unknown    Ascites due to alcoholic hepatitis [K70.11] 10/05/2017 10/20/2017 Yes    Leukocytosis [D72.829] 10/03/2017 10/26/2017 Yes    Coagulopathy [D68.9] 10/03/2017 10/26/2017 Yes    Metabolic acidosis with normal anion gap and bicarbonate losses [E87.2] 10/02/2017 10/20/2017 Yes    Hypernatremia [E87.0] 10/02/2017 10/06/2017 No    Hypokalemia [E87.6] 09/29/2017 10/20/2017 Yes    Pneumonia of both lower lobes due to infectious organism [J18.9] 09/29/2017 10/07/2017 Yes    Hepatic encephalopathy [K72.90] 09/28/2017 10/20/2017 Yes    History of hematemesis  [Z87.19] 09/28/2017 10/20/2017 Not Applicable    Hyponatremia [E87.1] 09/28/2017 10/20/2017 Yes    Alcoholic hepatitis with ascites [K70.11] 09/26/2017 10/20/2017 Yes       Consults         Status Ordering Provider     Inpatient consult to Dietary  Once     Provider:  (Not yet assigned)    Completed VANGIE BONE     Inpatient consult to Dietary  Once     Provider:  (Not yet assigned)    Completed KIMBERLI KILPATRICK     Inpatient consult to Endocrinology  Once     Provider:  (Not yet assigned)    Completed RIMMA CUEVAS     Inpatient consult to General Surgery  Once     Provider:  (Not yet assigned)    Completed MAGALI LEWIS IV     Inpatient consult to Hepatology  Once     Provider:  (Not yet assigned)    Completed THEPPOTE, NAEEM S.     Inpatient consult to Infectious Diseases  Once     Provider:  (Not yet assigned)    Completed WINTERBOTTOM, FIONA     Inpatient consult to Infectious Diseases  Once     Provider:  (Not yet assigned)    Completed CINTHIA, RAYSA     Inpatient consult to Nephrology  Once     Provider:  (Not yet assigned)    Completed MAGALI LEWIS H. IV     Inpatient consult to Nephrology  Once     Provider:  (Not yet assigned)    Completed VICENTE, RAYSA     Inpatient consult to Nephrology  Once     Provider:  (Not yet assigned)    Completed CELESTE HASSAN     Inpatient consult to Neurology  Once     Provider:  (Not yet assigned)    Completed ADIA MCLEOD     Inpatient consult to Physical Medicine Rehab  Once     Provider:  (Not yet assigned)    Completed KATHERINE REY     [facility]  Once     Provider:  (Not yet assigned)    Completed VICENTE, RAYSA     Inpatient consult to [facility]ICC team ([facility])  Once     Provider:  (Not yet assigned)    Completed VANDANA SAMUEL     Inpatient consult to Psychiatry  Once     Provider:  (Not yet assigned)    Completed THEPPOTE, NAEEM S.     Inpatient consult to Psychiatry  Once     Provider:  (Not yet assigned)     Completed VANGIE BONE     Inpatient consult to Psychiatry  Once     Provider:  (Not yet assigned)    Completed KATHERINE REY     Inpatient consult to Vascular (Stroke) Neurology  Once     Provider:  (Not yet assigned)    Completed ADIA MCLEOD     IP consult to dietary  Once     Provider:  (Not yet assigned)    Completed JAMEL MARCH     IP consult to Dietary  Once     Provider:  (Not yet assigned)    Completed RIMMA CUEVAS          Pending Diagnostic Studies:     Procedure Component Value Units Date/Time    CBC auto differential [[id number]] Collected:  10/20/17 0126    Order Status:  Sent Lab Status:  In process Updated:  10/20/17 0127    Specimen:  Blood from Blood     Comprehensive metabolic panel [[id number]] Collected:  10/20/17 0126    Order Status:  Sent Lab Status:  In process Updated:  10/20/17 0127    Specimen:  Blood from Blood     Freeze and Hold -BB HEP [[id number]] Collected:  10/20/17 0347    Order Status:  Sent Lab Status:  No result     Specimen:  Blood from Blood     Magnesium [[id number]] Collected:  10/20/17 0126    Order Status:  Sent Lab Status:  In process Updated:  10/20/17 0127    Specimen:  Blood from Blood     Magnesium [[id number]] Collected:  10/19/17 2205    Order Status:  Sent Lab Status:  In process Updated:  10/19/17 2205    Specimen:  Blood from Blood     Phosphorus [[id number]] Collected:  10/20/17 0126    Order Status:  Sent Lab Status:  In process Updated:  10/20/17 0127    Specimen:  Blood from Blood     Protime-INR [[id number]] Collected:  10/20/17 0126    Order Status:  Sent Lab Status:  In process Updated:  10/20/17 0127    Specimen:  Blood from Blood     Renal function panel [[id number]] Collected:  10/20/17 0126    Order Status:  Sent Lab Status:  In process Updated:  10/20/17 0127    Specimen:  Blood from Blood     Renal function panel [[id number]] Collected:  10/19/17 1605    Order Status:  Sent Lab Status:  In process Updated:  10/19/17 1605    Specimen:   "Blood from Blood     Urinalysis [[id number]] Collected:  10/03/17 1743    Order Status:  Sent Lab Status:  In process Updated:  10/03/17 1743    Specimen:  Urine     X-Ray Chest 1 View [[id number]]     Order Status:  Sent Lab Status:  No result         Significant Diagnostic Studies: Labs:   BMP:   Recent Labs  Lab 11/07/17 0427 11/08/17 0457   GLU 78 81    137   K 3.6 3.8    103   CO2 23 19*   BUN 25* 29*   CREATININE 3.8* 4.7*   CALCIUM 8.7 8.9   MG 1.5* 1.7    and CBC   Recent Labs  Lab 11/07/17 0427 11/08/17 0457   WBC 5.92 6.76   HGB 8.9* 8.4*   HCT 26.1* 25.7*   * 176       The patients clinical status was discussed at multidisplinary rounds, involving transplant surgery, transplant medicine, pharmacy, nursing, nutrition, and social work    Discharged Condition: good    Disposition:     Follow Up:    Patient Instructions:     3 IN 1 COMMODE FOR HOME USE   Order Specific Question Answer Comments   Type: Standard    Height: 5' 7" (1.702 m)    Weight: 75.7 kg (166 lb 14.2 oz)    Does patient have medical equipment at home? none    Length of need (1-99 months): 99      WALKER FOR HOME USE   Order Specific Question Answer Comments   Type of Walker: Adult (5'4"-6'6")    With wheels? Yes    Height: 5' 7" (1.702 m)    Weight: 75.7 kg (166 lb 14.2 oz)    Length of need (1-99 months): 99    Does patient have medical equipment at home? none    Please check all that apply: Patient's condition impairs ambulation.    Please check all that apply: Patient is unable to safely ambulate without equipment.    Please check all that apply: Patient needs help to get in and out of chair.      Referral to Home health   Referral Priority: Routine Referral Type: Home Health Care   Referral Reason: Specialty Services Required    Requested Specialty: Home Health Services    Number of Visits Requested: 1      Prepare RBC 1 Unit   Standing Status: Future  Standing Exp. Date: 12/04/18   Order Specific Question Answer " Comments   Number of Units 1 Unit    Purpose of Preparation: Other (Specify)    Other reason for preparation: anemia        Medications:  Reconciled Home Medications:   Current Discharge Medication List      START taking these medications    Details   aspirin (ECOTRIN) 81 MG EC tablet Take 1 tablet (81 mg total) by mouth once daily.  Qty: 30 tablet, Refills: 5      atovaquone (MEPRON) 750 mg/5 mL Susp Take 10 mLs (1,500 mg total) by mouth once daily. Stop on 4/17/18  Qty: 300 mL, Refills: 5      !! cycloSPORINE modified, NEORAL, (NEORAL) 100 MG capsule Take 2 capsules (200 mg total) by mouth 2 (two) times daily. Take with 25mg capsules for total of 250mg PO BID  Qty: 120 capsule, Refills: 11      !! cycloSPORINE modified, NEORAL, 25 MG capsule Take 2 capsules (50 mg total) by mouth 2 (two) times daily. Take with 100mg capsules for total of 250mg PO BID  Qty: 120 capsule, Refills: 11      ergocalciferol (ERGOCALCIFEROL) 50,000 unit Cap Take 1 capsule (50,000 Units total) by mouth every 7 days.  Qty: 4 capsule, Refills: 5      fluconazole (DIFLUCAN) 200 MG Tab Take 1 tablet (200 mg total) by mouth once daily. Stop on 11/18/17  Qty: 30 tablet, Refills: 0      levETIRAcetam (KEPPRA) 500 MG Tab Take 1 tablet (500 mg total) by mouth 2 (two) times daily.  Qty: 60 tablet, Refills: 11      multivitamin (THERAGRAN) tablet Take 1 tablet by mouth once daily.  Qty: 30 tablet, Refills: 11      mycophenolate (CELLCEPT) 250 mg Cap Take 4 capsules (1,000 mg total) by mouth 2 (two) times daily.  Qty: 240 capsule, Refills: 2      oxyCODONE-acetaminophen (PERCOCET)  mg per tablet Take 0.5-1 tablets by mouth every 4 (four) hours as needed for Pain.  Qty: 40 tablet, Refills: 0      pantoprazole (PROTONIX) 40 MG tablet Take 1 tablet (40 mg total) by mouth once daily.  Qty: 30 tablet, Refills: 5      predniSONE (DELTASONE) 10 MG tablet Take 20mg PO QD 10/29-11/4; 15mg PO QD 11/5-11/11; 10mg PO QD 11/12-11/18; 5mg PO QD 11/19-11/25;  2.5mg PO QD 11/26-12/2; stop on 12/2/17  Qty: 60 tablet, Refills: 0      thiamine 100 MG tablet Take 1 tablet (100 mg total) by mouth once daily.  Qty: 30 tablet, Refills: 6      valganciclovir 50 mg/ml (VALCYTE) 50 mg/mL oral solution Take 2 mLs (100 mg total) by mouth every Mon, Wed, Fri. Stop on 1/17/18  Qty: 24 mL, Refills: 2       !! - Potential duplicate medications found. Please discuss with provider.        Time spent caring for patient (Greater than 1/2 spent in direct face-to-face contact): > 30 minutes    Jenna M Oppenheimer, PA-C  Liver Transplant  [facility]

## 2017-11-08 NOTE — NURSING
Patient ready for dc to [facility]. Printed AVS reviewed. Blue med card updated by PharmD and patient has all needed rx for outpatient use. To followup with labs/clinic in am. Home health arranged per SW. Bedside commode and walker delivered to patient's room. Wound vac switched over to patient's unit at 125 mmHg cont sx. Patient and mother verbalize undertsanding of all dc instructions.

## 2017-11-08 NOTE — PROGRESS NOTES
Met with patient and his mother in preparation for discharge today.  Reviewed Self Assessment.  Patient answered all questions correctly. This assessment consists of questions regarding: post op care, medication management, infection prevention and emergency contacts.   Reviewed outpatient appointments.  Allowed time for questions and answers .

## 2017-11-08 NOTE — PROGRESS NOTES
DISCHARGE NOTE:    Jhonny Diana is a 27 y.o. male s/p   LIVER    - Brain Death transplant on 10/19/2017 (Liver) for ESLD secondary to Acute Alcoholic Hepatitis.      Past Medical History:   Diagnosis Date    Alcoholic hepatitis with ascites     Alcoholic hepatitis with ascites     History of hematemesis 2017    Hypertension        Hospital Course: Jhonny Diana is a 28 yo male with hx of ESLD 2/2 alcohol dx underwent liver txp 10/19 which took place without complication. Steroid induction used, CMV +/+. Pt with LAURA prior to and s/p txp requiring dialysis MWF. Hospital course complicated by pt having seizure on . Neuro was consulted and did not recommend starting AEDs at the time. Pt seized again the same day and therefore was loaded with Keppra per neuro.  Keppra level 22 on . Prograf was held at the time and pt was started on cyclosporine on  without complication. Pt to remain on keppa 500mg BID and follow up in outpatient. Thiamine started for patient and aspirin started today. Patient being discharged on cyclosporine 250mg BID, mycophenolate 1,000mg BID, and prednisone taper. OIs include atovaquone 1500mg daily, fluconazole 200mg daily, and valcyte 100mg (MWF post-dialysis).    Allergies:   Review of patient's allergies indicates:   Allergen Reactions    Bactrim [sulfamethoxazole-trimethoprim] Other (See Comments)     Mookie Trell Syndrome       Patient Pharmacy: [facility]    Discharge Medications:   Jhonny Diana   Home Medication Instructions NISHI:[id number]    Printed on:17 4428   Medication Information                      aspirin (ECOTRIN) 81 MG EC tablet  Take 1 tablet (81 mg total) by mouth once daily.             atovaquone (MEPRON) 750 mg/5 mL Susp  Take 10 mLs (1,500 mg total) by mouth once daily. Stop on 18             cycloSPORINE modified, NEORAL, (NEORAL) 100 MG capsule  Take 2 capsules (200 mg total) by mouth 2 (two) times daily. Take with 25mg capsules  for total of 250mg PO BID             cycloSPORINE modified, NEORAL, 25 MG capsule  Take 2 capsules (50 mg total) by mouth 2 (two) times daily. Take with 100mg capsules for total of 250mg PO BID             ergocalciferol (ERGOCALCIFEROL) 50,000 unit Cap  Take 1 capsule (50,000 Units total) by mouth every 7 days.             fluconazole (DIFLUCAN) 200 MG Tab  Take 1 tablet (200 mg total) by mouth once daily. Stop on 11/18/17             levETIRAcetam (KEPPRA) 500 MG Tab  Take 1 tablet (500 mg total) by mouth 2 (two) times daily.             multivitamin (THERAGRAN) tablet  Take 1 tablet by mouth once daily.             mycophenolate (CELLCEPT) 250 mg Cap  Take 4 capsules (1,000 mg total) by mouth 2 (two) times daily.             oxyCODONE-acetaminophen (PERCOCET)  mg per tablet  Take 0.5-1 tablets by mouth every 4 (four) hours as needed for Pain.             pantoprazole (PROTONIX) 40 MG tablet  Take 1 tablet (40 mg total) by mouth once daily.             predniSONE (DELTASONE) 10 MG tablet  Take 20mg PO QD 10/29-11/4; 15mg PO QD 11/5-11/11; 10mg PO QD 11/12-11/18; 5mg PO QD 11/19-11/25; 2.5mg PO QD 11/26-12/2; stop on 12/2/17             thiamine 100 MG tablet  Take 1 tablet (100 mg total) by mouth once daily.             valganciclovir 50 mg/ml (VALCYTE) 50 mg/mL oral solution  Take 2 mLs (100 mg total) by mouth every Mon, Wed, Fri. Stop on 1/17/18                 Pharmacy Interventions/Recommendations:  1) Transplant Immunosuppression: cyclosporine, cellcept, prednisone taper    2) Opportunistic Infection prophylaxis: atovaquone, fluconazole, valcyte    3) Patient Counseling/Education:  Demonstrated the use of the BP cuff, thermometer.    4) Follow-Up/Discharge Needs:  n/a    5) Patient received prescriptions for:      E-rx's:  Cyclosporine, cellcept, prednisone, atovaquone, fluconazole, valcyte, protonix, thiamine, aspirin, multivitamin, ergocalciferol         Printed rx's:  Percocet       Faxed /  Phoned in rx's:  n/a  To n/a pharmacy per patient request.    6) Patient Assistance Information: n/a    7) The following medications have been placed on HOLD and should be restarted in the outpatient setting (when appropriate): n/a    Jhonny and his caregiver verbalized their understanding and had the opportunity to ask questions.

## 2017-11-08 NOTE — PT/OT/SLP PROGRESS
Occupational Therapy      Jhonny Diana  MRN: [id number]    Patient not seen today secondary to Dialysis. Therapist unable to return in PM.  Will follow-up at next scheduled OT session .    Samantha Lopez, OT  11/8/2017

## 2017-11-09 ENCOUNTER — CLINICAL SUPPORT (OUTPATIENT)
Dept: TRANSPLANT | Facility: CLINIC | Age: 27
End: 2017-11-09
Payer: COMMERCIAL

## 2017-11-09 ENCOUNTER — LAB VISIT (OUTPATIENT)
Dept: LAB | Facility: HOSPITAL | Age: 27
End: 2017-11-09
Attending: SURGERY
Payer: COMMERCIAL

## 2017-11-09 ENCOUNTER — CONFERENCE (OUTPATIENT)
Dept: TRANSPLANT | Facility: CLINIC | Age: 27
End: 2017-11-09

## 2017-11-09 ENCOUNTER — TELEPHONE (OUTPATIENT)
Dept: TRANSPLANT | Facility: CLINIC | Age: 27
End: 2017-11-09

## 2017-11-09 VITALS
DIASTOLIC BLOOD PRESSURE: 69 MMHG | WEIGHT: 171.94 LBS | HEIGHT: 66 IN | OXYGEN SATURATION: 99 % | BODY MASS INDEX: 27.63 KG/M2 | SYSTOLIC BLOOD PRESSURE: 112 MMHG | HEART RATE: 95 BPM | HEART RATE: 95 BPM | RESPIRATION RATE: 16 BRPM | HEIGHT: 66 IN | TEMPERATURE: 99 F | BODY MASS INDEX: 27.63 KG/M2 | RESPIRATION RATE: 16 BRPM | WEIGHT: 171.94 LBS | OXYGEN SATURATION: 99 % | TEMPERATURE: 99 F | DIASTOLIC BLOOD PRESSURE: 69 MMHG | SYSTOLIC BLOOD PRESSURE: 112 MMHG

## 2017-11-09 DIAGNOSIS — Z94.4 LIVER REPLACED BY TRANSPLANT: Primary | ICD-10-CM

## 2017-11-09 DIAGNOSIS — R11.2 POST-OPERATIVE NAUSEA AND VOMITING: Primary | ICD-10-CM

## 2017-11-09 DIAGNOSIS — Z98.890 POST-OPERATIVE NAUSEA AND VOMITING: Primary | ICD-10-CM

## 2017-11-09 DIAGNOSIS — Z94.4 LIVER REPLACED BY TRANSPLANT: ICD-10-CM

## 2017-11-09 LAB
ALBUMIN SERPL BCP-MCNC: 2.6 G/DL
ALP SERPL-CCNC: 167 U/L
ALT SERPL W/O P-5'-P-CCNC: 7 U/L
ANION GAP SERPL CALC-SCNC: 10 MMOL/L
AST SERPL-CCNC: 11 U/L
BASOPHILS # BLD AUTO: 0.06 K/UL
BASOPHILS NFR BLD: 1 %
BILIRUB DIRECT SERPL-MCNC: 1.8 MG/DL
BILIRUB SERPL-MCNC: 2.5 MG/DL
BUN SERPL-MCNC: 20 MG/DL
CALCIUM SERPL-MCNC: 9 MG/DL
CHLORIDE SERPL-SCNC: 103 MMOL/L
CO2 SERPL-SCNC: 26 MMOL/L
CREAT SERPL-MCNC: 3.9 MG/DL
CYCLOSPORINE BLD LC/MS/MS-MCNC: 394 NG/ML
DIFFERENTIAL METHOD: ABNORMAL
EOSINOPHIL # BLD AUTO: 0 K/UL
EOSINOPHIL NFR BLD: 0.2 %
ERYTHROCYTE [DISTWIDTH] IN BLOOD BY AUTOMATED COUNT: 19 %
EST. GFR  (AFRICAN AMERICAN): 22.7 ML/MIN/1.73 M^2
EST. GFR  (NON AFRICAN AMERICAN): 19.7 ML/MIN/1.73 M^2
GLUCOSE SERPL-MCNC: 81 MG/DL
HCT VFR BLD AUTO: 26.5 %
HGB BLD-MCNC: 8.7 G/DL
IMM GRANULOCYTES # BLD AUTO: 0.03 K/UL
IMM GRANULOCYTES NFR BLD AUTO: 0.5 %
LYMPHOCYTES # BLD AUTO: 1.3 K/UL
LYMPHOCYTES NFR BLD: 21.9 %
MCH RBC QN AUTO: 31 PG
MCHC RBC AUTO-ENTMCNC: 32.8 G/DL
MCV RBC AUTO: 94 FL
MONOCYTES # BLD AUTO: 0.8 K/UL
MONOCYTES NFR BLD: 12.3 %
NEUTROPHILS # BLD AUTO: 3.9 K/UL
NEUTROPHILS NFR BLD: 64.1 %
NRBC BLD-RTO: 0 /100 WBC
PLATELET # BLD AUTO: 192 K/UL
PMV BLD AUTO: 10.7 FL
POTASSIUM SERPL-SCNC: 4.3 MMOL/L
PROT SERPL-MCNC: 5.2 G/DL
RBC # BLD AUTO: 2.81 M/UL
SODIUM SERPL-SCNC: 139 MMOL/L
WBC # BLD AUTO: 6.12 K/UL

## 2017-11-09 PROCEDURE — 99999 PR PBB SHADOW E&M-EST. PATIENT-LVL III: CPT | Mod: PBBFAC,,,

## 2017-11-09 PROCEDURE — 80053 COMPREHEN METABOLIC PANEL: CPT

## 2017-11-09 PROCEDURE — 80158 DRUG ASSAY CYCLOSPORINE: CPT

## 2017-11-09 PROCEDURE — 82248 BILIRUBIN DIRECT: CPT

## 2017-11-09 PROCEDURE — 99999 PR PBB SHADOW E&M-EST. PATIENT-LVL IV: CPT | Mod: PBBFAC,,,

## 2017-11-09 PROCEDURE — 36415 COLL VENOUS BLD VENIPUNCTURE: CPT

## 2017-11-09 PROCEDURE — 85025 COMPLETE CBC W/AUTO DIFF WBC: CPT

## 2017-11-09 RX ORDER — ONDANSETRON 8 MG/1
8 TABLET, ORALLY DISINTEGRATING ORAL EVERY 8 HOURS PRN
Qty: 30 TABLET | Refills: 0 | Status: SHIPPED | OUTPATIENT
Start: 2017-11-09 | End: 2018-01-10 | Stop reason: SDUPTHER

## 2017-11-09 NOTE — TELEPHONE ENCOUNTER
----- Message from Yoanna Petty LCSW sent at 11/9/2017  3:59 PM CST -----  Robert Hall,    Can you make sure orders are placed in the chart for a w/c? Thanks. Once they are placed, I can make referral to [employer]. :)   ----- Message -----  From: Isabel Gilliam RN  Sent: 11/9/2017   2:44 PM  To: [facility] Social Workers    Pt mother is requesting a wheelchair,reports it is difficult getting him around  He is still pretty weak and hard to find wheelchairs when needed.      Thanks  Isabel

## 2017-11-09 NOTE — PT/OT/SLP DISCHARGE
Occupational Therapy Discharge Summary    Jhonny Diana  MRN: [id number]   Liver transplanted   Patient Discharged from acute Occupational Therapy on 11/8/17.  Please refer to prior OT note dated on 11/7/17 for functional status.     Assessment:   Goals partially met.  GOALS:    Occupational Therapy Goals        Problem: Occupational Therapy Goal    Goal Priority Disciplines Outcome Interventions   Occupational Therapy Goal     OT, PT/OT Ongoing (interventions implemented as appropriate)    Description:  Goals to be met by:  11/10/17    Patient will increase functional independence with ADLs by performing:    UE Dressing with Minimal Assistance. MET (10/29/2017)  LE Dressing with Moderate Assistance with AD as needed. - MET 10/31  Revised: LE dressing with Set-up assistance and SBA  Grooming while standing at sink with Minimal Assistance.  Toileting from toilet with Minimal Assistance for hygiene and clothing management.   Supine to sit with Moderate Assistance.  Toilet transfer to toilet with Minimal Assistance.  Upper extremity exercise program per handout, with independence.                                   Reasons for Discontinuation of Therapy Services  Transfer to alternate level of care.      Plan:  Patient Discharged to:[facility] with  Home with Home Health Service.

## 2017-11-09 NOTE — PROGRESS NOTES
Clinic Note: First Return to Clinic Post-  Liver Transplant    Jhonny Diana  is a 27 y.o. male  S/p   LIVER transplant on 10/19/2017 (Liver) for Acute Alcoholic Hepatitis.      Met with patient and his caregiver in the clinic to review current medication list.     Current Outpatient Prescriptions   Medication Sig Dispense Refill    aspirin (ECOTRIN) 81 MG EC tablet Take 1 tablet (81 mg total) by mouth once daily. 30 tablet 5    atovaquone (MEPRON) 750 mg/5 mL Susp Take 10 mLs (1,500 mg total) by mouth once daily. Stop on 4/17/18 300 mL 5    cycloSPORINE modified, NEORAL, (NEORAL) 100 MG capsule Take 2 capsules (200 mg total) by mouth 2 (two) times daily. Take with 25mg capsules for total of 250mg PO  capsule 11    cycloSPORINE modified, NEORAL, 25 MG capsule Take 2 capsules (50 mg total) by mouth 2 (two) times daily. Take with 100mg capsules for total of 250mg PO  capsule 11    ergocalciferol (ERGOCALCIFEROL) 50,000 unit Cap Take 1 capsule (50,000 Units total) by mouth every 7 days. 4 capsule 5    fluconazole (DIFLUCAN) 200 MG Tab Take 1 tablet (200 mg total) by mouth once daily. Stop on 11/18/17 30 tablet 0    levETIRAcetam (KEPPRA) 500 MG Tab Take 1 tablet (500 mg total) by mouth 2 (two) times daily. 60 tablet 11    multivitamin (THERAGRAN) tablet Take 1 tablet by mouth once daily. 30 tablet 11    mycophenolate (CELLCEPT) 250 mg Cap Take 4 capsules (1,000 mg total) by mouth 2 (two) times daily. 240 capsule 2    oxyCODONE-acetaminophen (PERCOCET)  mg per tablet Take 0.5-1 tablets by mouth every 4 (four) hours as needed for Pain. 40 tablet 0    pantoprazole (PROTONIX) 40 MG tablet Take 1 tablet (40 mg total) by mouth once daily. 30 tablet 5    predniSONE (DELTASONE) 10 MG tablet Take 20mg PO QD 10/29-11/4; 15mg PO QD 11/5-11/11; 10mg PO QD 11/12-11/18; 5mg PO QD 11/19-11/25; 2.5mg PO QD 11/26-12/2; stop on 12/2/17 60 tablet 0    thiamine 100 MG tablet Take 1 tablet (100 mg total) by  mouth once daily. 30 tablet 6    valganciclovir 50 mg/ml (VALCYTE) 50 mg/mL oral solution Take 2 mLs (100 mg total) by mouth every Mon, Wed, Fri. Stop on 1/17/18 24 mL 2     No current facility-administered medications for this visit.          1) Discussed any concerns or problems that the patient encountered since discharge: Patient's main complaint related to his wound vac. Coordinator made aware. Patient also had questions about flight of thoughts. Discussed this can be a side effect of his steroids, and should improve with time.     2) Reconciled the EMR by having the patient verbalize their medications, dose, and frequency.      3) Verified that patient had prescriptions filled after being discharged from the hospital    4) Reviewed vital signs and laboratory values, and evaluated the need to adjust doses of medications.  Blood pressure well controlled.    5) Reinforced medication education conducted in the hospital, including medication indications, dosing, administration, side effects, monitoring-- including timing of immunosuppressant levels.     6) OTHER medication follow-up:      Patient received their FIRST fill of medications from [facility].  Discussed the process for obtaining refills of medications, including verifying the dose and mailing address to have medications delivered.     Jhonny and his caregivers verbalized understanding and had the opportunity to ask questions.

## 2017-11-09 NOTE — TELEPHONE ENCOUNTER
Labs reviewed will decrease cya to 200 mg bid from 250 mg bid repeat labs tomorrow. Pt mother notified and agreed with plan

## 2017-11-10 ENCOUNTER — NURSE TRIAGE (OUTPATIENT)
Dept: ADMINISTRATIVE | Facility: CLINIC | Age: 27
End: 2017-11-10

## 2017-11-10 ENCOUNTER — TELEPHONE (OUTPATIENT)
Dept: ADMINISTRATIVE | Facility: CLINIC | Age: 27
End: 2017-11-10

## 2017-11-10 ENCOUNTER — LAB VISIT (OUTPATIENT)
Dept: LAB | Facility: HOSPITAL | Age: 27
End: 2017-11-10
Attending: SURGERY
Payer: COMMERCIAL

## 2017-11-10 ENCOUNTER — TELEPHONE (OUTPATIENT)
Dept: TRANSPLANT | Facility: CLINIC | Age: 27
End: 2017-11-10

## 2017-11-10 DIAGNOSIS — Z94.4 LIVER REPLACED BY TRANSPLANT: ICD-10-CM

## 2017-11-10 DIAGNOSIS — Z94.4 LIVER REPLACED BY TRANSPLANT: Primary | ICD-10-CM

## 2017-11-10 LAB
ALBUMIN SERPL BCP-MCNC: 2.6 G/DL
ALP SERPL-CCNC: 160 U/L
ALT SERPL W/O P-5'-P-CCNC: 9 U/L
ANION GAP SERPL CALC-SCNC: 10 MMOL/L
AST SERPL-CCNC: 12 U/L
BASOPHILS # BLD AUTO: 0.07 K/UL
BASOPHILS NFR BLD: 1.3 %
BILIRUB SERPL-MCNC: 2.5 MG/DL
BUN SERPL-MCNC: 28 MG/DL
CALCIUM SERPL-MCNC: 8.9 MG/DL
CHLORIDE SERPL-SCNC: 101 MMOL/L
CO2 SERPL-SCNC: 23 MMOL/L
CREAT SERPL-MCNC: 4.7 MG/DL
CYCLOSPORINE BLD LC/MS/MS-MCNC: 346 NG/ML
DIFFERENTIAL METHOD: ABNORMAL
EOSINOPHIL # BLD AUTO: 0 K/UL
EOSINOPHIL NFR BLD: 0 %
ERYTHROCYTE [DISTWIDTH] IN BLOOD BY AUTOMATED COUNT: 18.9 %
EST. GFR  (AFRICAN AMERICAN): 18.2 ML/MIN/1.73 M^2
EST. GFR  (NON AFRICAN AMERICAN): 15.7 ML/MIN/1.73 M^2
GLUCOSE SERPL-MCNC: 81 MG/DL
HCT VFR BLD AUTO: 25.6 %
HGB BLD-MCNC: 8.5 G/DL
IMM GRANULOCYTES # BLD AUTO: 0.03 K/UL
IMM GRANULOCYTES NFR BLD AUTO: 0.5 %
LYMPHOCYTES # BLD AUTO: 1.4 K/UL
LYMPHOCYTES NFR BLD: 24.5 %
MCH RBC QN AUTO: 31 PG
MCHC RBC AUTO-ENTMCNC: 33.2 G/DL
MCV RBC AUTO: 93 FL
MONOCYTES # BLD AUTO: 0.7 K/UL
MONOCYTES NFR BLD: 12.9 %
NEUTROPHILS # BLD AUTO: 3.4 K/UL
NEUTROPHILS NFR BLD: 60.8 %
NRBC BLD-RTO: 0 /100 WBC
PLATELET # BLD AUTO: 238 K/UL
PMV BLD AUTO: 10 FL
POTASSIUM SERPL-SCNC: 3.8 MMOL/L
PROT SERPL-MCNC: 5.3 G/DL
RBC # BLD AUTO: 2.74 M/UL
SODIUM SERPL-SCNC: 134 MMOL/L
WBC # BLD AUTO: 5.59 K/UL

## 2017-11-10 PROCEDURE — 80053 COMPREHEN METABOLIC PANEL: CPT

## 2017-11-10 PROCEDURE — 80158 DRUG ASSAY CYCLOSPORINE: CPT

## 2017-11-10 PROCEDURE — 85025 COMPLETE CBC W/AUTO DIFF WBC: CPT

## 2017-11-10 PROCEDURE — 36415 COLL VENOUS BLD VENIPUNCTURE: CPT

## 2017-11-10 NOTE — TELEPHONE ENCOUNTER
----- Message from Nathanael Medina MD sent at 11/10/2017  1:29 PM CST -----  Results ok. No action needed

## 2017-11-10 NOTE — TELEPHONE ENCOUNTER
EZEKIEL received notification from pt's post-liver RN coordinator, Isabel Gilliam stating that orders for a wheel chair were placed in pt's chart yesterday as pt needs a w/c to go longer distances. EZEKIEL contacted the pt ([phone]) regarding a good time for [employer] to deliver w/c. Pt requested anytime between 10-2pm as pt will need to leave for dialysis which begins at 3pm. SW expressed understanding and will touch base with [employer]. Pt stated his permission. EZEKIEL remains available.    EZEKIEL spoke with Sarahi from [facility] [id number] who states that she has located the order for pt's w/c and will have it delivered to his address at the [id number]. EZEKIEL provided Sarahi with pt's contact number so that pt can be notified when w/c on its way. EZEKIEL remains available.

## 2017-11-11 NOTE — TELEPHONE ENCOUNTER
Reason for Disposition   Caller has medication question about med not prescribed by PCP and triager unable to answer question (e.g., compatibility with other med, storage)    Protocols used: ST MEDICATION QUESTION CALL-A-IDRIS Barry did receive liver transplant on 10/19/17.  [facility] in-patient pharmacy called for Elvira, to answer her question about Valcyte suspension.  She said she received all his medication on Wednesday, when he was discharged from the hospital and they went to stay in [facility] apartments.  She wants to know if this medication is stable at room temperature, as it has been at room temperature since it was given to them.  I told her I would find out for her, and called [facility] in-patient pharmacist, Mila Gonzalez, PharmD.  Explained Elvira's concerns, and Mila states she will research this with her transplant pharm resource, and she will call her as soon as she can give her an appropriate answer.  Provided Mila  with Elvira's number, and told Elvira she can expect a call from Mila as soon as possible. Mila also asked that I let Elvira know that if new prescription for Valcyte is needed for Jhonny, she will bring it to [facility] to her tonight.  Encouraged Elvira to call transplant coordinator tomorrow morning for follow up.  Message to transplant team. Please contact caller directly with any additional care advice.    BPA 9 - medication question

## 2017-11-12 NOTE — PT/OT/SLP DISCHARGE
Physical Therapy Discharge Summary    Jhonny Diana  MRN: [id number]   Liver transplanted   Patient Discharged from acute Physical Therapy on 17.  Please refer to prior PT noted date on 17 for functional status.     Assessment:   Patient was discharge unexpectedly.  Information required to complete and accurate discharge summary is unknown.  Refer to therapy initial evaluation and last progress note for initial and most recent functional status and goal achievement.  Recommendations made may be found in medical record. Patient has not met goals.  GOALS:    Physical Therapy Goals        Problem: Physical Therapy Goal    Goal Priority Disciplines Outcome Goal Variances Interventions   Physical Therapy Goal     PT/OT, PT Ongoing (interventions implemented as appropriate)     Description:  Goals to be met by: 2017    Patient will increase functional independence with mobility by performin. Supine to sit with CGA. - GOAL MET  2. Sit to supine with CGA - GOAL MET  3. Sit to stand transfer with SBA using LRAD or no Ad. - GOAL MET  4. Bed to chair transfer with SBA using LRAD  - GOAL MET  5. Gait x150 feet with SBA using LRAD or no Ad. - GOAL MET  6. Ascend/descend 4 stair with bilateral Handrails with CGA.  7. Lower extremity exercise program x20 reps per handout, with independence.  8.  Pt to perf car transfer simulation with CGA.                               Reasons for Discontinuation of Therapy Services  Transfer to alternate level of care.      Plan:  Patient Discharged to: [address] with  Home with Home Health Service.    Charline Zavala, COLLETTE, PT  2017

## 2017-11-13 ENCOUNTER — TELEPHONE (OUTPATIENT)
Dept: TRANSPLANT | Facility: CLINIC | Age: 27
End: 2017-11-13

## 2017-11-13 ENCOUNTER — LAB VISIT (OUTPATIENT)
Dept: LAB | Facility: HOSPITAL | Age: 27
End: 2017-11-13
Attending: SURGERY
Payer: COMMERCIAL

## 2017-11-13 DIAGNOSIS — Z94.4 LIVER REPLACED BY TRANSPLANT: ICD-10-CM

## 2017-11-13 DIAGNOSIS — Z94.4 LIVER REPLACED BY TRANSPLANT: Primary | ICD-10-CM

## 2017-11-13 LAB
ALBUMIN SERPL BCP-MCNC: 2.5 G/DL
ALP SERPL-CCNC: 138 U/L
ALT SERPL W/O P-5'-P-CCNC: 8 U/L
ANION GAP SERPL CALC-SCNC: 13 MMOL/L
AST SERPL-CCNC: 16 U/L
BASOPHILS # BLD AUTO: 0.04 K/UL
BASOPHILS NFR BLD: 0.9 %
BILIRUB DIRECT SERPL-MCNC: 1.6 MG/DL
BILIRUB SERPL-MCNC: 2.2 MG/DL
BUN SERPL-MCNC: 26 MG/DL
CALCIUM SERPL-MCNC: 8.9 MG/DL
CHLORIDE SERPL-SCNC: 100 MMOL/L
CO2 SERPL-SCNC: 23 MMOL/L
CREAT SERPL-MCNC: 4.9 MG/DL
CYCLOSPORINE BLD LC/MS/MS-MCNC: 196 NG/ML
DIFFERENTIAL METHOD: ABNORMAL
EOSINOPHIL # BLD AUTO: 0 K/UL
EOSINOPHIL NFR BLD: 0.2 %
ERYTHROCYTE [DISTWIDTH] IN BLOOD BY AUTOMATED COUNT: 18.7 %
EST. GFR  (AFRICAN AMERICAN): 17.3 ML/MIN/1.73 M^2
EST. GFR  (NON AFRICAN AMERICAN): 14.9 ML/MIN/1.73 M^2
GLUCOSE SERPL-MCNC: 71 MG/DL
HCT VFR BLD AUTO: 25.4 %
HGB BLD-MCNC: 8.3 G/DL
IMM GRANULOCYTES # BLD AUTO: 0.02 K/UL
IMM GRANULOCYTES NFR BLD AUTO: 0.5 %
LYMPHOCYTES # BLD AUTO: 1.2 K/UL
LYMPHOCYTES NFR BLD: 26.5 %
MCH RBC QN AUTO: 30.9 PG
MCHC RBC AUTO-ENTMCNC: 32.7 G/DL
MCV RBC AUTO: 94 FL
MONOCYTES # BLD AUTO: 0.8 K/UL
MONOCYTES NFR BLD: 17.4 %
NEUTROPHILS # BLD AUTO: 2.4 K/UL
NEUTROPHILS NFR BLD: 54.5 %
NRBC BLD-RTO: 0 /100 WBC
PLATELET # BLD AUTO: 298 K/UL
PMV BLD AUTO: 9.6 FL
POTASSIUM SERPL-SCNC: 4.1 MMOL/L
PROT SERPL-MCNC: 5.1 G/DL
RBC # BLD AUTO: 2.69 M/UL
SODIUM SERPL-SCNC: 136 MMOL/L
WBC # BLD AUTO: 4.42 K/UL

## 2017-11-13 PROCEDURE — 36415 COLL VENOUS BLD VENIPUNCTURE: CPT

## 2017-11-13 PROCEDURE — 80158 DRUG ASSAY CYCLOSPORINE: CPT

## 2017-11-13 PROCEDURE — 85025 COMPLETE CBC W/AUTO DIFF WBC: CPT

## 2017-11-13 PROCEDURE — 82248 BILIRUBIN DIRECT: CPT

## 2017-11-13 PROCEDURE — 80053 COMPREHEN METABOLIC PANEL: CPT

## 2017-11-13 NOTE — TELEPHONE ENCOUNTER
----- Message from Michelle Herrera MD sent at 11/13/2017  2:55 PM CST -----  Results ok. No action needed.

## 2017-11-13 NOTE — TELEPHONE ENCOUNTER
----- Message from Alisa Henry sent at 11/13/2017  8:22 AM CST -----  Contact: patient mother   Patient mother calling to get a letter about the patient txp date for bank. Also needs to speak with you about the patient wound vac.       Please call         Thanks!

## 2017-11-13 NOTE — TELEPHONE ENCOUNTER
Pt mother has reached out to  wound care they will address wound care issue. Will reach out to pharmacy in regards to valcyte

## 2017-11-14 ENCOUNTER — OFFICE VISIT (OUTPATIENT)
Dept: TRANSPLANT | Facility: CLINIC | Age: 27
End: 2017-11-14
Payer: COMMERCIAL

## 2017-11-14 VITALS
DIASTOLIC BLOOD PRESSURE: 74 MMHG | HEIGHT: 66 IN | WEIGHT: 182.56 LBS | RESPIRATION RATE: 18 BRPM | BODY MASS INDEX: 29.34 KG/M2 | OXYGEN SATURATION: 97 % | SYSTOLIC BLOOD PRESSURE: 117 MMHG | TEMPERATURE: 99 F | HEART RATE: 95 BPM

## 2017-11-14 DIAGNOSIS — Z94.4 LIVER REPLACED BY TRANSPLANT: ICD-10-CM

## 2017-11-14 DIAGNOSIS — Z51.81 ENCOUNTER FOR THERAPEUTIC DRUG MONITORING: Primary | ICD-10-CM

## 2017-11-14 PROBLEM — K70.31 ASCITES DUE TO ALCOHOLIC CIRRHOSIS: Status: RESOLVED | Noted: 2017-11-14 | Resolved: 2017-11-08

## 2017-11-14 PROCEDURE — 99999 PR PBB SHADOW E&M-EST. PATIENT-LVL III: CPT | Mod: PBBFAC,,,

## 2017-11-14 PROCEDURE — 99214 OFFICE O/P EST MOD 30 MIN: CPT | Mod: 24,S$GLB,, | Performed by: TRANSPLANT SURGERY

## 2017-11-14 RX ORDER — OXYCODONE AND ACETAMINOPHEN 10; 325 MG/1; MG/1
.5-1 TABLET ORAL EVERY 4 HOURS PRN
Qty: 40 TABLET | Refills: 0 | Status: ON HOLD | OUTPATIENT
Start: 2017-11-14 | End: 2017-11-30 | Stop reason: ALTCHOICE

## 2017-11-14 NOTE — PROGRESS NOTES
Transplant Surgery  Liver Transplant Recipient Follow-up    Original Referring Physician: ZANE Nieto  Current Corresponding Physician:     Chief Complaint: Jhonny is here for follow up of his liver transplant performed 10/19/2017 for the primary diagnosis (UNOS) of Acute Alcoholic Hepatitis    ORGAN: LIVER  Whole or Partial: whole liver  Donor Type:  - brain death  PHS Increased Risk: no  Donor CMV Status: Positive  Donor HCV Status: Negative  Donor HBcAb: Negative  Biliary Anastomosis: end to end  Arterial Anatomy: standard  IVC reconstruction: end to end ivc  Portal vein status: patent    Subjective:     History of Present Illness: He has had the following complications since transplant: renal insufficiency and wound infection.  The noted complications need to be addressed more thoroughly today.    Interval History: Currently, he is doing adequately.  Current complaints include abdominal pain and edema.  Jhonny is here for management of his immunosuppression medication.    Review of Systems   Constitutional: Negative for activity change and appetite change.   HENT: Negative for congestion and tinnitus.    Eyes: Negative for redness and visual disturbance.   Respiratory: Negative for cough and shortness of breath.    Cardiovascular: Negative for chest pain and palpitations.   Gastrointestinal: Negative for abdominal distention and abdominal pain.   Endocrine: Negative for polydipsia and polyuria.   Genitourinary: Negative for decreased urine volume and dysuria.   Musculoskeletal: Negative for arthralgias and back pain.   Skin: Negative for pallor and rash.   Allergic/Immunologic: Positive for immunocompromised state. Negative for environmental allergies.   Neurological: Negative for tremors and headaches.   Hematological: Negative for adenopathy. Does not bruise/bleed easily.   Psychiatric/Behavioral: Negative for behavioral problems and confusion.       Objective:     Physical Exam   Constitutional:  He is oriented to person, place, and time. He appears well-developed and well-nourished. No distress.   HENT:   Head: Normocephalic.   Eyes: No scleral icterus.   Neck: Normal range of motion. Neck supple. No JVD present.   Cardiovascular: Normal rate, regular rhythm and intact distal pulses.    Pulmonary/Chest: Effort normal. No respiratory distress.   Abdominal: Soft. He exhibits no mass. There is no rebound and no guarding.       Musculoskeletal: Normal range of motion.   Neurological: He is alert and oriented to person, place, and time.   Skin: Skin is warm and dry. He is not diaphoretic.   Psychiatric: He has a normal mood and affect. His behavior is normal. Judgment and thought content normal.     Lab Results   Component Value Date    BILITOT 2.2 (H) 11/13/2017    AST 16 11/13/2017    ALT 8 (L) 11/13/2017    ALKPHOS 138 (H) 11/13/2017    CREATININE 4.9 (H) 11/13/2017    ALBUMIN 2.5 (L) 11/13/2017     Lab Results   Component Value Date    WBC 4.42 11/13/2017    HGB 8.3 (L) 11/13/2017    HCT 25.4 (L) 11/13/2017    HCT 28 (L) 10/20/2017     11/13/2017     Lab Results   Component Value Date    TACROLIMUS <1.5 (L) 11/07/2017    CYCLOSPORINE 196 11/13/2017       Assessment/Plan:          · S/P liver transplant.  · Chronic immunosuppressive medications for rejection prophylaxis at target.  Plan: no adjustment needed.  · Continue monitoring symptoms, labs and drug levels for drug-related toxicity and side effects.  · Incision: wound open: fully wound vac  · Femoral arterial line site: no complications evident   · Renal Insufficiency: Lasix 80mg daily x 2 days, evaluate response  · Pain medication:Renewed Rx this week, discussion with patient that this should be last expected refill, encouraged tapering and alternating with regular Tylenol    Solis Chapa MD       [facility] Patient Status  Functional Status: 50% - Requires considerable assistance and frequent medical care  Physical Capacity: Limited Mobility

## 2017-11-17 ENCOUNTER — HOSPITAL ENCOUNTER (INPATIENT)
Facility: HOSPITAL | Age: 27
LOS: 4 days | Discharge: HOME | DRG: 862 | End: 2017-11-21
Attending: EMERGENCY MEDICINE | Admitting: EMERGENCY MEDICINE
Payer: COMMERCIAL

## 2017-11-17 DIAGNOSIS — Z79.60 LONG-TERM USE OF IMMUNOSUPPRESSANT MEDICATION: Chronic | ICD-10-CM

## 2017-11-17 DIAGNOSIS — Z94.4 LIVER TRANSPLANTED: Primary | ICD-10-CM

## 2017-11-17 DIAGNOSIS — T81.89XD DELAYED SURGICAL WOUND HEALING, SUBSEQUENT ENCOUNTER: ICD-10-CM

## 2017-11-17 DIAGNOSIS — Z91.89 AT RISK FOR OPPORTUNISTIC INFECTIONS: ICD-10-CM

## 2017-11-17 DIAGNOSIS — N17.9 AKI (ACUTE KIDNEY INJURY): ICD-10-CM

## 2017-11-17 DIAGNOSIS — R53.81 PHYSICAL DECONDITIONING: ICD-10-CM

## 2017-11-17 DIAGNOSIS — R50.9 FEVER, UNSPECIFIED FEVER CAUSE: ICD-10-CM

## 2017-11-17 DIAGNOSIS — R50.9 FEVER: ICD-10-CM

## 2017-11-17 DIAGNOSIS — Z29.89 PROPHYLACTIC IMMUNOTHERAPY: Chronic | ICD-10-CM

## 2017-11-17 DIAGNOSIS — N17.0 ACUTE RENAL FAILURE WITH TUBULAR NECROSIS: ICD-10-CM

## 2017-11-17 DIAGNOSIS — E83.42 HYPOMAGNESEMIA: ICD-10-CM

## 2017-11-17 DIAGNOSIS — R56.9 SEIZURE: ICD-10-CM

## 2017-11-17 DIAGNOSIS — D63.8 ANEMIA OF CHRONIC DISEASE: ICD-10-CM

## 2017-11-17 PROBLEM — T81.89XA DELAYED SURGICAL WOUND HEALING: Status: ACTIVE | Noted: 2017-11-17

## 2017-11-17 PROBLEM — T14.8XXD DELAYED WOUND HEALING: Status: ACTIVE | Noted: 2017-11-17

## 2017-11-17 PROBLEM — K70.10 ALCOHOLIC HEPATITIS WITHOUT ASCITES: Status: RESOLVED | Noted: 2017-10-24 | Resolved: 2017-11-17

## 2017-11-17 LAB
ABO + RH BLD: NORMAL
ALBUMIN SERPL BCP-MCNC: 2.3 G/DL
ALP SERPL-CCNC: 129 U/L
ALT SERPL W/O P-5'-P-CCNC: 7 U/L
ANION GAP SERPL CALC-SCNC: 10 MMOL/L
APTT BLDCRRT: <21 SEC
AST SERPL-CCNC: 14 U/L
BASOPHILS # BLD AUTO: 0.04 K/UL
BASOPHILS NFR BLD: 0.9 %
BILIRUB SERPL-MCNC: 2.4 MG/DL
BILIRUB UR QL STRIP: NEGATIVE
BLD GP AB SCN CELLS X3 SERPL QL: NORMAL
BUN SERPL-MCNC: 14 MG/DL
CALCIUM SERPL-MCNC: 8.5 MG/DL
CHLORIDE SERPL-SCNC: 98 MMOL/L
CLARITY UR REFRACT.AUTO: CLEAR
CO2 SERPL-SCNC: 29 MMOL/L
COLOR UR AUTO: YELLOW
CREAT SERPL-MCNC: 3.1 MG/DL
DIFFERENTIAL METHOD: ABNORMAL
EOSINOPHIL # BLD AUTO: 0 K/UL
EOSINOPHIL NFR BLD: 0.2 %
ERYTHROCYTE [DISTWIDTH] IN BLOOD BY AUTOMATED COUNT: 18.4 %
EST. GFR  (AFRICAN AMERICAN): 30 ML/MIN/1.73 M^2
EST. GFR  (NON AFRICAN AMERICAN): 26 ML/MIN/1.73 M^2
FERRITIN SERPL-MCNC: 1531 NG/ML
FLUAV AG SPEC QL IA: NEGATIVE
FLUBV AG SPEC QL IA: NEGATIVE
GLUCOSE SERPL-MCNC: 86 MG/DL
GLUCOSE UR QL STRIP: NEGATIVE
HCT VFR BLD AUTO: 23.7 %
HGB BLD-MCNC: 7.6 G/DL
HGB UR QL STRIP: NEGATIVE
IMM GRANULOCYTES # BLD AUTO: 0.02 K/UL
IMM GRANULOCYTES NFR BLD AUTO: 0.4 %
INR PPP: 1.1
IRON SERPL-MCNC: 44 UG/DL
KETONES UR QL STRIP: NEGATIVE
LACTATE SERPL-SCNC: 0.8 MMOL/L
LDH SERPL L TO P-CCNC: 279 U/L
LEUKOCYTE ESTERASE UR QL STRIP: NEGATIVE
LYMPHOCYTES # BLD AUTO: 1.1 K/UL
LYMPHOCYTES NFR BLD: 22.3 %
MAGNESIUM SERPL-MCNC: 1.4 MG/DL
MCH RBC QN AUTO: 30 PG
MCHC RBC AUTO-ENTMCNC: 32.1 G/DL
MCV RBC AUTO: 94 FL
MONOCYTES # BLD AUTO: 1.1 K/UL
MONOCYTES NFR BLD: 22.6 %
NEUTROPHILS # BLD AUTO: 2.5 K/UL
NEUTROPHILS NFR BLD: 53.6 %
NITRITE UR QL STRIP: NEGATIVE
NRBC BLD-RTO: 0 /100 WBC
PH UR STRIP: 6 [PH] (ref 5–8)
PHOSPHATE SERPL-MCNC: 2.7 MG/DL
PLATELET # BLD AUTO: 321 K/UL
PMV BLD AUTO: 9.2 FL
POTASSIUM SERPL-SCNC: 3.3 MMOL/L
PROCALCITONIN SERPL IA-MCNC: 0.82 NG/ML
PROT SERPL-MCNC: 5.1 G/DL
PROT UR QL STRIP: NEGATIVE
PROTHROMBIN TIME: 11.5 SEC
RBC # BLD AUTO: 2.53 M/UL
SATURATED IRON: 30 %
SODIUM SERPL-SCNC: 137 MMOL/L
SP GR UR STRIP: 1.01 (ref 1–1.03)
SPECIMEN SOURCE: NORMAL
TACROLIMUS BLD-MCNC: <1.5 NG/ML
TOTAL IRON BINDING CAPACITY: 149 UG/DL
TRANSFERRIN SERPL-MCNC: 101 MG/DL
TRANSFERRIN SERPL-MCNC: 101 MG/DL
TROPONIN I SERPL DL<=0.01 NG/ML-MCNC: 0.01 NG/ML
URN SPEC COLLECT METH UR: NORMAL
UROBILINOGEN UR STRIP-ACNC: NEGATIVE EU/DL
WBC # BLD AUTO: 4.7 K/UL

## 2017-11-17 PROCEDURE — 85610 PROTHROMBIN TIME: CPT

## 2017-11-17 PROCEDURE — 85730 THROMBOPLASTIN TIME PARTIAL: CPT

## 2017-11-17 PROCEDURE — 99285 EMERGENCY DEPT VISIT HI MDM: CPT | Mod: 25

## 2017-11-17 PROCEDURE — 25000003 PHARM REV CODE 250: Performed by: NURSE PRACTITIONER

## 2017-11-17 PROCEDURE — 81003 URINALYSIS AUTO W/O SCOPE: CPT

## 2017-11-17 PROCEDURE — 36430 TRANSFUSION BLD/BLD COMPNT: CPT

## 2017-11-17 PROCEDURE — 93010 ELECTROCARDIOGRAM REPORT: CPT | Mod: ,,, | Performed by: INTERNAL MEDICINE

## 2017-11-17 PROCEDURE — 83735 ASSAY OF MAGNESIUM: CPT

## 2017-11-17 PROCEDURE — 80197 ASSAY OF TACROLIMUS: CPT

## 2017-11-17 PROCEDURE — 63600175 PHARM REV CODE 636 W HCPCS: Performed by: EMERGENCY MEDICINE

## 2017-11-17 PROCEDURE — 83540 ASSAY OF IRON: CPT

## 2017-11-17 PROCEDURE — 86920 COMPATIBILITY TEST SPIN: CPT

## 2017-11-17 PROCEDURE — 99223 1ST HOSP IP/OBS HIGH 75: CPT | Mod: ,,, | Performed by: INTERNAL MEDICINE

## 2017-11-17 PROCEDURE — 63600175 PHARM REV CODE 636 W HCPCS: Performed by: PHYSICIAN ASSISTANT

## 2017-11-17 PROCEDURE — 63600175 PHARM REV CODE 636 W HCPCS: Performed by: STUDENT IN AN ORGANIZED HEALTH CARE EDUCATION/TRAINING PROGRAM

## 2017-11-17 PROCEDURE — 86900 BLOOD TYPING SEROLOGIC ABO: CPT

## 2017-11-17 PROCEDURE — 84100 ASSAY OF PHOSPHORUS: CPT

## 2017-11-17 PROCEDURE — 87400 INFLUENZA A/B EACH AG IA: CPT

## 2017-11-17 PROCEDURE — 80100016 HC MAINTENANCE HEMODIALYSIS

## 2017-11-17 PROCEDURE — 63600175 PHARM REV CODE 636 W HCPCS: Performed by: NURSE PRACTITIONER

## 2017-11-17 PROCEDURE — 25000003 PHARM REV CODE 250: Performed by: STUDENT IN AN ORGANIZED HEALTH CARE EDUCATION/TRAINING PROGRAM

## 2017-11-17 PROCEDURE — 83615 LACTATE (LD) (LDH) ENZYME: CPT

## 2017-11-17 PROCEDURE — 20600001 HC STEP DOWN PRIVATE ROOM

## 2017-11-17 PROCEDURE — 96375 TX/PRO/DX INJ NEW DRUG ADDON: CPT

## 2017-11-17 PROCEDURE — 82728 ASSAY OF FERRITIN: CPT

## 2017-11-17 PROCEDURE — 99223 1ST HOSP IP/OBS HIGH 75: CPT | Mod: 24,AI,, | Performed by: NURSE PRACTITIONER

## 2017-11-17 PROCEDURE — 83605 ASSAY OF LACTIC ACID: CPT

## 2017-11-17 PROCEDURE — 85025 COMPLETE CBC W/AUTO DIFF WBC: CPT

## 2017-11-17 PROCEDURE — 86901 BLOOD TYPING SEROLOGIC RH(D): CPT

## 2017-11-17 PROCEDURE — 96365 THER/PROPH/DIAG IV INF INIT: CPT

## 2017-11-17 PROCEDURE — 80053 COMPREHEN METABOLIC PANEL: CPT

## 2017-11-17 PROCEDURE — 96366 THER/PROPH/DIAG IV INF ADDON: CPT

## 2017-11-17 PROCEDURE — 87040 BLOOD CULTURE FOR BACTERIA: CPT | Mod: 59

## 2017-11-17 PROCEDURE — 87086 URINE CULTURE/COLONY COUNT: CPT

## 2017-11-17 PROCEDURE — 84484 ASSAY OF TROPONIN QUANT: CPT

## 2017-11-17 PROCEDURE — 36415 COLL VENOUS BLD VENIPUNCTURE: CPT

## 2017-11-17 PROCEDURE — 99285 EMERGENCY DEPT VISIT HI MDM: CPT | Mod: ,,, | Performed by: EMERGENCY MEDICINE

## 2017-11-17 PROCEDURE — 84145 PROCALCITONIN (PCT): CPT

## 2017-11-17 RX ORDER — VALGANCICLOVIR HYDROCHLORIDE 50 MG/ML
100 POWDER, FOR SOLUTION ORAL
Status: DISCONTINUED | OUTPATIENT
Start: 2017-11-17 | End: 2017-11-20

## 2017-11-17 RX ORDER — HYDROMORPHONE HYDROCHLORIDE 1 MG/ML
0.5 INJECTION, SOLUTION INTRAMUSCULAR; INTRAVENOUS; SUBCUTANEOUS
Status: COMPLETED | OUTPATIENT
Start: 2017-11-17 | End: 2017-11-17

## 2017-11-17 RX ORDER — ATOVAQUONE 750 MG/5ML
1500 SUSPENSION ORAL DAILY
Status: DISCONTINUED | OUTPATIENT
Start: 2017-11-17 | End: 2017-11-21 | Stop reason: HOSPADM

## 2017-11-17 RX ORDER — ERGOCALCIFEROL 1.25 MG/1
50000 CAPSULE ORAL
Status: DISCONTINUED | OUTPATIENT
Start: 2017-11-21 | End: 2017-11-21 | Stop reason: HOSPADM

## 2017-11-17 RX ORDER — RAMELTEON 8 MG/1
8 TABLET ORAL NIGHTLY PRN
Status: DISCONTINUED | OUTPATIENT
Start: 2017-11-17 | End: 2017-11-21 | Stop reason: HOSPADM

## 2017-11-17 RX ORDER — OXYCODONE AND ACETAMINOPHEN 10; 325 MG/1; MG/1
1 TABLET ORAL EVERY 4 HOURS PRN
Status: DISCONTINUED | OUTPATIENT
Start: 2017-11-17 | End: 2017-11-21 | Stop reason: HOSPADM

## 2017-11-17 RX ORDER — LEVETIRACETAM 500 MG/1
500 TABLET ORAL 2 TIMES DAILY
Status: DISCONTINUED | OUTPATIENT
Start: 2017-11-17 | End: 2017-11-21 | Stop reason: HOSPADM

## 2017-11-17 RX ORDER — PANTOPRAZOLE SODIUM 40 MG/1
40 TABLET, DELAYED RELEASE ORAL DAILY
Status: DISCONTINUED | OUTPATIENT
Start: 2017-11-17 | End: 2017-11-21 | Stop reason: HOSPADM

## 2017-11-17 RX ORDER — ONDANSETRON 8 MG/1
8 TABLET, ORALLY DISINTEGRATING ORAL EVERY 8 HOURS PRN
Status: DISCONTINUED | OUTPATIENT
Start: 2017-11-17 | End: 2017-11-21 | Stop reason: HOSPADM

## 2017-11-17 RX ORDER — DOCUSATE SODIUM 100 MG/1
100 CAPSULE, LIQUID FILLED ORAL 3 TIMES DAILY
Status: DISCONTINUED | OUTPATIENT
Start: 2017-11-17 | End: 2017-11-21 | Stop reason: HOSPADM

## 2017-11-17 RX ORDER — SODIUM CHLORIDE 0.9 % (FLUSH) 0.9 %
3 SYRINGE (ML) INJECTION
Status: DISCONTINUED | OUTPATIENT
Start: 2017-11-17 | End: 2017-11-21 | Stop reason: HOSPADM

## 2017-11-17 RX ORDER — HYDROMORPHONE HYDROCHLORIDE 1 MG/ML
1 INJECTION, SOLUTION INTRAMUSCULAR; INTRAVENOUS; SUBCUTANEOUS ONCE
Status: COMPLETED | OUTPATIENT
Start: 2017-11-17 | End: 2017-11-17

## 2017-11-17 RX ORDER — FLUCONAZOLE 200 MG/1
200 TABLET ORAL DAILY
Status: DISCONTINUED | OUTPATIENT
Start: 2017-11-17 | End: 2017-11-21 | Stop reason: HOSPADM

## 2017-11-17 RX ORDER — ASPIRIN 81 MG/1
81 TABLET ORAL DAILY
Status: DISCONTINUED | OUTPATIENT
Start: 2017-11-17 | End: 2017-11-21 | Stop reason: HOSPADM

## 2017-11-17 RX ORDER — FUROSEMIDE 10 MG/ML
100 INJECTION INTRAMUSCULAR; INTRAVENOUS ONCE
Status: COMPLETED | OUTPATIENT
Start: 2017-11-17 | End: 2017-11-17

## 2017-11-17 RX ORDER — ACETAMINOPHEN 325 MG/1
650 TABLET ORAL EVERY 8 HOURS PRN
Status: DISCONTINUED | OUTPATIENT
Start: 2017-11-17 | End: 2017-11-21 | Stop reason: HOSPADM

## 2017-11-17 RX ORDER — FUROSEMIDE 10 MG/ML
120 INJECTION INTRAMUSCULAR; INTRAVENOUS 2 TIMES DAILY
Status: DISCONTINUED | OUTPATIENT
Start: 2017-11-17 | End: 2017-11-21 | Stop reason: HOSPADM

## 2017-11-17 RX ORDER — DOCUSATE SODIUM 100 MG/1
100 CAPSULE, LIQUID FILLED ORAL 3 TIMES DAILY PRN
Status: ON HOLD | COMMUNITY
End: 2018-04-09

## 2017-11-17 RX ORDER — SODIUM CHLORIDE 9 MG/ML
INJECTION, SOLUTION INTRAVENOUS ONCE
Status: COMPLETED | OUTPATIENT
Start: 2017-11-17 | End: 2017-11-17

## 2017-11-17 RX ORDER — SODIUM CHLORIDE 9 MG/ML
INJECTION, SOLUTION INTRAVENOUS
Status: DISCONTINUED | OUTPATIENT
Start: 2017-11-17 | End: 2017-11-21 | Stop reason: HOSPADM

## 2017-11-17 RX ORDER — THIAMINE HCL 50 MG
100 TABLET ORAL DAILY
Status: DISCONTINUED | OUTPATIENT
Start: 2017-11-17 | End: 2017-11-21 | Stop reason: HOSPADM

## 2017-11-17 RX ORDER — FUROSEMIDE 80 MG/1
80 TABLET ORAL DAILY
Status: ON HOLD | COMMUNITY
End: 2017-11-21 | Stop reason: ALTCHOICE

## 2017-11-17 RX ORDER — ONDANSETRON 2 MG/ML
4 INJECTION INTRAMUSCULAR; INTRAVENOUS
Status: COMPLETED | OUTPATIENT
Start: 2017-11-17 | End: 2017-11-17

## 2017-11-17 RX ORDER — HEPARIN SODIUM 1000 [USP'U]/ML
1000 INJECTION, SOLUTION INTRAVENOUS; SUBCUTANEOUS
Status: DISCONTINUED | OUTPATIENT
Start: 2017-11-17 | End: 2017-11-21 | Stop reason: HOSPADM

## 2017-11-17 RX ORDER — PREDNISONE 10 MG/1
10 TABLET ORAL DAILY
Status: DISPENSED | OUTPATIENT
Start: 2017-11-17 | End: 2017-11-19

## 2017-11-17 RX ORDER — PREDNISONE 5 MG/1
5 TABLET ORAL DAILY
Status: DISCONTINUED | OUTPATIENT
Start: 2017-11-19 | End: 2017-11-21 | Stop reason: HOSPADM

## 2017-11-17 RX ORDER — MAGNESIUM SULFATE HEPTAHYDRATE 40 MG/ML
2 INJECTION, SOLUTION INTRAVENOUS ONCE
Status: COMPLETED | OUTPATIENT
Start: 2017-11-17 | End: 2017-11-17

## 2017-11-17 RX ADMIN — OXYCODONE AND ACETAMINOPHEN 1 TABLET: 10; 325 TABLET ORAL at 10:11

## 2017-11-17 RX ADMIN — OXYCODONE AND ACETAMINOPHEN 1 TABLET: 10; 325 TABLET ORAL at 07:11

## 2017-11-17 RX ADMIN — FUROSEMIDE 120 MG: 10 INJECTION, SOLUTION INTRAVENOUS at 05:11

## 2017-11-17 RX ADMIN — SODIUM CHLORIDE 300 ML: 900 INJECTION, SOLUTION INTRAVENOUS at 04:11

## 2017-11-17 RX ADMIN — DOCUSATE SODIUM 100 MG: 100 CAPSULE, LIQUID FILLED ORAL at 09:11

## 2017-11-17 RX ADMIN — HEPARIN SODIUM 1000 UNITS: 1000 INJECTION, SOLUTION INTRAVENOUS; SUBCUTANEOUS at 04:11

## 2017-11-17 RX ADMIN — LEVETIRACETAM 500 MG: 500 TABLET ORAL at 09:11

## 2017-11-17 RX ADMIN — OXYCODONE AND ACETAMINOPHEN 1 TABLET: 10; 325 TABLET ORAL at 03:11

## 2017-11-17 RX ADMIN — CYCLOSPORINE 200 MG: 100 CAPSULE, LIQUID FILLED ORAL at 09:11

## 2017-11-17 RX ADMIN — MAGNESIUM SULFATE IN WATER 2 G: 40 INJECTION, SOLUTION INTRAVENOUS at 08:11

## 2017-11-17 RX ADMIN — FUROSEMIDE 100 MG: 10 INJECTION, SOLUTION INTRAMUSCULAR; INTRAVENOUS at 10:11

## 2017-11-17 RX ADMIN — MORPHINE 100 MG: 10 SOLUTION ORAL at 05:11

## 2017-11-17 RX ADMIN — VANCOMYCIN HYDROCHLORIDE 1250 MG: 100 INJECTION, POWDER, LYOPHILIZED, FOR SOLUTION INTRAVENOUS at 12:11

## 2017-11-17 RX ADMIN — ONDANSETRON 4 MG: 2 INJECTION INTRAMUSCULAR; INTRAVENOUS at 07:11

## 2017-11-17 RX ADMIN — HYDROMORPHONE HYDROCHLORIDE 1 MG: 1 INJECTION, SOLUTION INTRAMUSCULAR; INTRAVENOUS; SUBCUTANEOUS at 09:11

## 2017-11-17 RX ADMIN — HYDROMORPHONE HYDROCHLORIDE 0.5 MG: 1 INJECTION, SOLUTION INTRAMUSCULAR; INTRAVENOUS; SUBCUTANEOUS at 07:11

## 2017-11-17 NOTE — H&P
[facility]  Liver Transplant  History & Physical    Patient Name: Jhonny Diana  MRN: [id number]  Admission Date: 2017  Code Status: Full Code  Primary Care Provider: Primary Doctor No  Post-Operative Day: 29     ORGAN:   LIVER  Disease Etiology: Acute Alcoholic Hepatitis  Donor Type:    - Brain Death  [facility] High Risk:   No  Donor CMV Status:   Donor CMV Status: Positive  Donor HBcAB:   Negative  Donor HCV Status:   Negative  Whole or Partial: Whole Liver  Biliary Anastomosis: End to End  Arterial Anatomy: Standard    Subjective:     History of Present Illness:  26 y/o male with pmh of ESLD secondary ETOH.  Now s/p oltx 10/19/2017 (Liver), which took place without complication.  Steroid induction, CMV +/+.  Post op complicated by LAURA, seizures, and delayed wound healing.  Wound vac placed to chevron incision 10/29.  Presents to ED with 1 day history of low grade fever, tmax 100.9.  States have more abdominal pain, particularly rlq radiating to right flank.  Remains on HD outpatient MWF schedule via right subclavian permacath.  Reports making more urine and last HD 11/15.      Scheduled Meds:   sodium chloride 0.9%   Intravenous Once    aspirin  81 mg Oral Daily    atovaquone  1,500 mg Oral Daily    cycloSPORINE modified (NEORAL)  200 mg Oral BID    [START ON 2017] ergocalciferol  50,000 Units Oral Q7 Days    fluconazole  200 mg Oral Daily    levETIRAcetam  500 mg Oral BID    multivitamin  1 tablet Oral Daily    pantoprazole  40 mg Oral Daily    predniSONE  10 mg Oral Daily    [START ON 2017] predniSONE  5 mg Oral Daily    thiamine  100 mg Oral Daily    valganciclovir 50 mg/ml  100 mg Oral Every Mon, Wed, Fri     Continuous Infusions:   PRN Meds:sodium chloride 0.9%, acetaminophen, ondansetron, oxyCODONE-acetaminophen, ramelteon, sodium chloride 0.9%    Review of Systems   Constitutional: Positive for activity change (decreased), appetite change (improving) and  fever. Negative for chills, diaphoresis and fatigue.   HENT: Negative for congestion, mouth sores and trouble swallowing.    Eyes: Negative for visual disturbance.   Respiratory: Negative for cough, choking, shortness of breath and wheezing.    Cardiovascular: Negative for chest pain, palpitations and leg swelling.   Gastrointestinal: Positive for abdominal distention and abdominal pain. Negative for constipation, diarrhea, nausea and vomiting.   Endocrine: Negative.    Genitourinary: Positive for flank pain. Negative for decreased urine volume, difficulty urinating, dysuria, hematuria and urgency.   Musculoskeletal: Negative for arthralgias, back pain and joint swelling.   Skin: Positive for wound. Negative for color change, pallor and rash.   Allergic/Immunologic: Positive for immunocompromised state.   Neurological: Positive for weakness. Negative for dizziness, tremors, seizures, syncope and headaches.   Hematological: Bruises/bleeds easily.   Psychiatric/Behavioral: Positive for sleep disturbance. Negative for agitation, confusion, decreased concentration, dysphoric mood, hallucinations and suicidal ideas. The patient is not nervous/anxious.    All other systems reviewed and are negative.    Objective:     Vital Signs (Most Recent):  Temp: 99.1 °F (37.3 °C) (11/17/17 1243)  Pulse: 98 (11/17/17 1243)  Resp: 16 (11/17/17 1243)  BP: 131/74 (11/17/17 1243)  SpO2: 98 % (11/17/17 1243) Vital Signs (24h Range):  Temp:  [98.8 °F (37.1 °C)-99.3 °F (37.4 °C)] 99.1 °F (37.3 °C)  Pulse:  [] 98  Resp:  [16-20] 16  SpO2:  [97 %-99 %] 98 %  BP: (114-131)/(61-74) 131/74     Weight: 80.3 kg (177 lb)  Body mass index is 28.57 kg/m².    Intake/Output - Last 3 Shifts       11/15 0700 - 11/16 0659 11/16 0700 - 11/17 0659 11/17 0700 - 11/18 0659    Urine (mL/kg/hr)   125 (0.2)    Total Output     125    Net     -125                 Physical Exam   Constitutional: He is oriented to person, place, and time. He appears  well-developed and well-nourished. No distress.   Temporal and distal extremity muscle wasting   HENT:   Head: Normocephalic and atraumatic.   Mouth/Throat: No oropharyngeal exudate.   Eyes: EOM are normal. Pupils are equal, round, and reactive to light. Scleral icterus is present.   Neck: Normal range of motion. Neck supple.   Cardiovascular: Normal rate, regular rhythm, normal heart sounds and intact distal pulses.  Exam reveals no gallop and no friction rub.    No murmur heard.  Pulmonary/Chest: Effort normal and breath sounds normal. No respiratory distress. He has no wheezes. He has no rales. He exhibits no tenderness.   Dec R base   Abdominal: Soft. Bowel sounds are normal. He exhibits no distension. There is tenderness. There is no rebound and no guarding.       Chevron incision opened, drainage yellow/brown    Musculoskeletal: Normal range of motion. He exhibits edema (Gen +2-3).   Neurological: He is alert and oriented to person, place, and time.   Skin: Skin is warm and dry. Capillary refill takes 2 to 3 seconds. He is not diaphoretic. There is erythema (ble, rlq abdomen).   Psychiatric: He has a normal mood and affect. His behavior is normal. Judgment and thought content normal.   Nursing note and vitals reviewed.      Laboratory:  Immunosuppressants         Stop Route Frequency     cycloSPORINE modified (NEORAL) capsule 200 mg      -- Oral 2 times daily        CBC:     Recent Labs  Lab 11/17/17  0632   WBC 4.70   RBC 2.53*   HGB 7.6*   HCT 23.7*      MCV 94   MCH 30.0   MCHC 32.1     CMP:     Recent Labs  Lab 11/17/17  0632   GLU 86   CALCIUM 8.5*   ALBUMIN 2.3*   PROT 5.1*      K 3.3*   CO2 29   CL 98   BUN 14   CREATININE 3.1*   ALKPHOS 129   ALT 7*   AST 14   BILITOT 2.4*     Coagulation:     Recent Labs  Lab 11/17/17  0632   INR 1.1   APTT <21.0     Labs within the past 24 hours have been reviewed.    Diagnostic Results:  I have personally reviewed all pertinent imaging  studies.    Assessment/Plan:     * Fever    - infectious work up initiated,   - give dose of vancomycin           Delayed surgical wound healing    - et nurse consulted, dressing due to be changed today  - wound erythematous, give dose of vancomycin          Seizure    - continue keppra          LAURA (acute kidney injury)    - nephrology consulted  - HD outpatient MWF via permacath  - making more urine, trial lasix 100mg today          Liver transplanted    - LFts stable, monitor labs daily          Prophylactic immunotherapy    - see long term immunosuppression          Long-term use of immunosuppressant medication    - continue cyclosporine and prednisone  - monitor cyclosporine levels daily and adjust for therapeutic dose  - hold cellcept due to likely infection          At risk for opportunistic infections    - continue valcyte for CMV prophylaxis and atovaquone for pcp prophylaxis          Physical deconditioning    - order pt eval & treat              Discharge Planning: not candidate at this time  Monitor kidney function --> may need outpt HD  Monitor HH needs vs rehab for deconditioned status      Seda Camacho, NP  Liver Transplant  [facility]

## 2017-11-17 NOTE — ASSESSMENT & PLAN NOTE
- nephrology consulted  - HD outpatient MWF via permacath  - making more urine, trial lasix 100mg today

## 2017-11-17 NOTE — SUBJECTIVE & OBJECTIVE
Past Medical History:   Diagnosis Date    Alcoholic hepatitis with ascites     Alcoholic hepatitis with ascites     History of hematemesis 9/28/2017    Hypertension     Renal disorder        Past Surgical History:   Procedure Laterality Date    APPENDECTOMY         Review of patient's allergies indicates:   Allergen Reactions    Bactrim [sulfamethoxazole-trimethoprim] Other (See Comments)     Mookie Trell Syndrome     Current Facility-Administered Medications   Medication Frequency    0.9%  NaCl infusion PRN    0.9%  NaCl infusion Once    acetaminophen tablet 650 mg Q8H PRN    aspirin EC tablet 81 mg Daily    atovaquone suspension 1,500 mg Daily    cycloSPORINE modified (NEORAL) capsule 200 mg BID    [START ON 11/21/2017] ergocalciferol capsule 50,000 Units Q7 Days    fluconazole tablet 200 mg Daily    levETIRAcetam tablet 500 mg BID    multivitamin tablet 1 tablet Daily    ondansetron disintegrating tablet 8 mg Q8H PRN    oxyCODONE-acetaminophen  mg per tablet 1 tablet Q4H PRN    pantoprazole EC tablet 40 mg Daily    predniSONE tablet 10 mg Daily    [START ON 11/19/2017] predniSONE tablet 5 mg Daily    ramelteon tablet 8 mg Nightly PRN    sodium chloride 0.9% flush 3 mL PRN    thiamine tablet 100 mg Daily    valganciclovir 50 mg/ml oral solution 100 mg Every Mon, Wed, Fri    vancomycin (VANCOCIN) 1,250 mg in dextrose 5 % 250 mL IVPB Once     Family History     None        Social History Main Topics    Smoking status: Former Smoker     Types: Cigarettes    Smokeless tobacco: Never Used    Alcohol use No      Comment: a fifth of liquor daily for years, cut back over last 2 months    Drug use: No    Sexual activity: Not on file     Review of Systems   Constitutional: Positive for activity change, chills, fatigue and fever.   HENT: Negative.    Eyes: Negative.    Respiratory: Negative.    Cardiovascular: Positive for leg swelling. Negative for chest pain.   Gastrointestinal:  Positive for abdominal distention and abdominal pain.   Endocrine: Negative.    Genitourinary: Negative for difficulty urinating and flank pain.   Musculoskeletal: Negative.    Skin: Positive for color change and pallor.   Neurological: Negative.    Psychiatric/Behavioral: Negative.      Objective:     Vital Signs (Most Recent):  Temp: 98.8 °F (37.1 °C) (11/17/17 1002)  Pulse: 100 (11/17/17 1002)  Resp: 16 (11/17/17 1002)  BP: 117/66 (11/17/17 1002)  SpO2: 98 % (11/17/17 1002)  O2 Device (Oxygen Therapy): room air (11/17/17 0615) Vital Signs (24h Range):  Temp:  [98.8 °F (37.1 °C)-99.3 °F (37.4 °C)] 98.8 °F (37.1 °C)  Pulse:  [] 100  Resp:  [16-20] 16  SpO2:  [97 %-99 %] 98 %  BP: (114-121)/(61-69) 117/66     Weight: 80.3 kg (177 lb) (11/17/17 0615)  Body mass index is 28.57 kg/m².  Body surface area is 1.93 meters squared.    No intake/output data recorded.    Physical Exam   Constitutional: He is oriented to person, place, and time. He appears well-developed and well-nourished.   HENT:   Head: Atraumatic.   Eyes: EOM are normal. Scleral icterus is present.   Neck: Neck supple.   Pulmonary/Chest: Effort normal and breath sounds normal.   RLL diminished otherwise clear   Abdominal: Soft. Bowel sounds are normal. He exhibits distension. There is tenderness.   Wound vac intact   Musculoskeletal: Normal range of motion. He exhibits edema (pitting ).   Neurological: He is alert and oriented to person, place, and time.   Skin: Skin is warm and dry.   Psychiatric: He has a normal mood and affect.       Significant Labs:  All labs within the past 24 hours have been reviewed.    Significant Imaging:  Labs: Reviewed

## 2017-11-17 NOTE — ASSESSMENT & PLAN NOTE
- continue cyclosporine and prednisone  - monitor cyclosporine levels daily and adjust for therapeutic dose  - hold cellcept due to likely infection

## 2017-11-17 NOTE — HPI
26 yo male with significant history for asthma, hypertension, alcoholic hepatitis/ESLD sp transplant 10/19 then discharged on 11/8/17, and LAURA secondary to hypotension/ischemic and toxic ATN now dialysis dependent who is presented to hospital for fever 100.9 this 5 am associated with generalized body aches and lower abdominal pain x 2 days. Low grade temp 99.8 earlier yesterday. Denies chills, headaches, dizziness, change in vision, chest pain, shortness of breath, palpitations, diaphoresis, orthopnea, PND, nausea, vomiting, or focal extremities weakness/numbness. EKG . CXR right pleural fluid decrease from previous. Abd x ray question ileus. Temp 99.3. Hgb 7.6. No leukocytosis. Lytes K 3.3, Mg 1.4 other wise stable. Acid base stable. Lactate 0.8. Influenza AB negative.  Blood cultures pending. Vancomycin IV given in ED.    Nephrology consult for hemodialysis. HD [facility] MWF 3.5 hrs via R IJ TDC at [facility]. Last HD 11/5 pre wt 83 kg post wt 81.2 kg. EDW TBD. Residual function varies 150-500 ml/day with home lasix 80 mg daily. Denies any drainage from TDC site or tenderness. BUN/Cr 14/3.1.

## 2017-11-17 NOTE — ED PROVIDER NOTES
Encounter Date: 11/17/2017    SCRIBE #1 NOTE: I, Aminata Rodriguez, am scribing for, and in the presence of,  Dr. Joel. I have scribed the entire note.       History     Chief Complaint   Patient presents with    Fever     pt presents to ed c/o fever 100.1 at home - pt  s/p liver transplant 2 weeks ago - noticed temp yesterday of 99.8 and thought it would go away and today he woke up with generaslized body aches, fever , - n/v/d.  c/o  lower abdominal pain and rlq at wound vac site - pt has dialysis access to right arm - received dialysis wednesday      Time patient was seen by the provider: 6:26 AM      The patient is a 27 y.o. male with hx of: HTN and alcoholic hepatitis with ascites s/p transplant 2 weeks ago that presents to the ED with a complaint of fever of 100.9 that onset this morning when patient woke up in pain and took some pain medication. He endorsed feeling body aches all over and then checked his temperature. Pt also complains of lower abdominal pain and a painful area on the right abdomen near his wound site. He has been passing gas and having BMs less frequently than baseline. He denies any coughing, SOB, or leg swelling that is changed from baseline. Pt endorses allergy to Bactrim.         The history is provided by the patient and medical records.     Review of patient's allergies indicates:   Allergen Reactions    Bactrim [sulfamethoxazole-trimethoprim] Other (See Comments)     Mookie Trell Syndrome     Past Medical History:   Diagnosis Date    Alcoholic hepatitis with ascites     Alcoholic hepatitis with ascites     History of hematemesis 9/28/2017    Hypertension     Renal disorder      Past Surgical History:   Procedure Laterality Date    APPENDECTOMY       No family history on file.  Social History   Substance Use Topics    Smoking status: Former Smoker     Types: Cigarettes    Smokeless tobacco: Never Used    Alcohol use Yes      Comment: a fifth of liquor daily for years,  cut back over last 2 months     Review of Systems   Constitutional: Positive for fever.   HENT: Negative for nosebleeds and rhinorrhea.    Eyes: Negative for visual disturbance.   Respiratory: Negative for cough and shortness of breath.    Cardiovascular: Negative for leg swelling.   Gastrointestinal: Negative for constipation, diarrhea, nausea and vomiting.   Genitourinary: Negative for dysuria and hematuria.   Musculoskeletal: Negative for back pain and neck pain.   Skin: Positive for wound (abdomen). Negative for rash.   Neurological: Negative for syncope and headaches.       Physical Exam     Initial Vitals   BP Pulse Resp Temp SpO2   11/17/17 0615 11/17/17 0615 11/17/17 0615 11/17/17 0617 11/17/17 0615   119/61 (!) 112 18 99.3 °F (37.4 °C) 97 %      MAP       11/17/17 0615       80.33         Physical Exam    Nursing note and vitals reviewed.  Constitutional: He appears ill.   HENT:   Head: Normocephalic and atraumatic.   Mouth/Throat: Mucous membranes are dry.   Eyes: No scleral icterus.   Neck: Neck supple.   Cardiovascular: Regular rhythm and normal heart sounds. Tachycardia present.    Pulmonary/Chest: Breath sounds normal. No accessory muscle usage. No tachypnea. No respiratory distress.   Abdominal: He exhibits no distension.   Musculoskeletal: Normal range of motion.   Neurological: He has normal strength. No cranial nerve deficit. GCS eye subscore is 4. GCS verbal subscore is 5. GCS motor subscore is 6.   Skin: Skin is warm and dry.         ED Course   Procedures  Labs Reviewed   CBC W/ AUTO DIFFERENTIAL - Abnormal; Notable for the following:        Result Value    RBC 2.53 (*)     Hemoglobin 7.6 (*)     Hematocrit 23.7 (*)     RDW 18.4 (*)     Mono # 1.1 (*)     Mono% 22.6 (*)     All other components within normal limits   COMPREHENSIVE METABOLIC PANEL - Abnormal; Notable for the following:     Potassium 3.3 (*)     Creatinine 3.1 (*)     Calcium 8.5 (*)     Total Protein 5.1 (*)     Albumin 2.3 (*)      Total Bilirubin 2.4 (*)     ALT 7 (*)     eGFR if  30.0 (*)     eGFR if non  26.0 (*)     All other components within normal limits   PROCALCITONIN - Abnormal; Notable for the following:     Procalcitonin 0.82 (*)     All other components within normal limits   LACTATE DEHYDROGENASE - Abnormal; Notable for the following:      (*)     All other components within normal limits   TACROLIMUS LEVEL - Abnormal; Notable for the following:     Tacrolimus Lvl <1.5 (*)     All other components within normal limits   MAGNESIUM - Abnormal; Notable for the following:     Magnesium 1.4 (*)     All other components within normal limits   CULTURE, BLOOD   CULTURE, BLOOD   APTT   LACTIC ACID, PLASMA   PROTIME-INR   TROPONIN I   PHOSPHORUS   INFLUENZA A AND B ANTIGEN   URINALYSIS, REFLEX TO URINE CULTURE   TYPE & SCREEN             Medical Decision Making:   History:   Old Medical Records: I decided to obtain old medical records.  Initial Assessment:   27 year old male s/p liver transplant 2 weeks ago now measuring a fever at home of 101. No obvious localizing sx but he does have an open wound with suction applied. There is no obvious erythema. Will be concerned about fever in an immunosuppressed pt.   Clinical Tests:   Lab Tests: Ordered and Reviewed  Radiological Study: Ordered and Reviewed  Medical Tests: Ordered and Reviewed  Other:   I have discussed this case with another health care provider.            Scribe Attestation:   Scribe #1: I performed the above scribed service and the documentation accurately describes the services I performed. I attest to the accuracy of the note.    Attending Attestation:             Attending ED Notes:   7:43 AM  Have notified transplant of pt's presence in the ED and that we are working him up.     patient presenting to the ED with history of fever patient had a recent liver transpl  Which was 2 weeks ago patient evaluated in the ED with blood work  blood cultures liver transplant surgery evaluated  To their service          ED Course      Clinical Impression:   The encounter diagnosis was Fever.    Disposition:   Disposition: Admitted  Condition: Serious                        Nam Joel MD  12/21/17 [id number]

## 2017-11-17 NOTE — PROGRESS NOTES
Patient arrived on unit via stretcher. Report received from Shannan Wagoner. Standing weight obtained @ 82.8 kg. Maintenance dialysis initiated via right CVC. Ports aspirated and flushed without difficulty. Lines connected and secured. Good bloodm flows established.

## 2017-11-17 NOTE — HPI
28 y/o male with pmh of ESLD secondary ETOH.  Now s/p oltx 10/19/2017 (Liver), which took place without complication.  Steroid induction, CMV +/+.  Post op complicated by LAURA, seizures, and delayed wound healing.  Wound vac placed to chevron incision 10/29.  Presents to ED with 1 day history of low grade fever, tmax 100.9.  States have more abdominal pain, particularly rlq radiating to right flank.  Remains on HD outpatient MWF schedule via right subclavian permacath.  Reports making more urine and last HD 11/17.

## 2017-11-17 NOTE — ASSESSMENT & PLAN NOTE
LAURA secondary to hypotension/ischemic and toxic ATN now dialysis dependent   -HD [facility] MWF 3.5 hrs via R IJ TDC at [facility]. Last HD 11/5 pre wt 83 kg post wt 81.2 kg. EDW TBD. Residual function varies 150-500 ml/day with home lasix 80 mg daily. Denies any drainage from TDC site or tenderness. BUN/Cr 14/3.1.   -Cr down from previous. K 3.3 otherwise lytes and acid base stable.  -Will discuss with staff hold HD today and start lasix 120 mg IV BID. If increase UOP, then obtain 24 hr CrCl. Monitor for renal recovery.      Anemia  -hgb 7.6 from 8.3 on 11/8.    -vitamin b 12 and folate normal.   -Obtain iron studies. Will review out patient HD flowsheet and start VIDA accordingly    BMD  -Phos 2.7. No binders for now.

## 2017-11-17 NOTE — CONSULTS
[facility]  Nephrology  Consult Note    Patient Name: Jhonny Diana  MRN: [id number]  Admission Date: 11/17/2017  Hospital Length of Stay: 0 days  Attending Provider: Jw Valdivia MD   Primary Care Physician: Primary Doctor No  Principal Problem:<principal problem not specified>    Consults  Subjective:     HPI: 28 yo male with significant history for asthma, hypertension, alcoholic hepatitis/ESLD sp transplant 10/19 then discharged on 11/8/17, and LAURA secondary to hypotension/ischemic and toxic ATN now dialysis dependent who is presented to hospital for fever 100.9 this 5 am associated with generalized body aches and lower abdominal pain x 2 days. Low grade temp 99.8 earlier yesterday. Denies chills, headaches, dizziness, change in vision, chest pain, shortness of breath, palpitations, diaphoresis, orthopnea, PND, nausea, vomiting, or focal extremities weakness/numbness. EKG . CXR right pleural fluid decrease from previous. Abd x ray question ileus. Temp 99.3. Hgb 7.6. No leukocytosis. Lytes K 3.3, Mg 1.4 other wise stable. Acid base stable. Lactate 0.8. Influenza AB negative.  Blood cultures pending. Vancomycin IV given in ED.    Nephrology consult for hemodialysis. HD [facility] MWF 3.5 hrs via R IJ TDC at [facility]. Last HD 11/5 pre wt 83 kg post wt 81.2 kg. EDW TBD. Residual function varies 150-500 ml/day with home lasix 80 mg daily. Denies any drainage from TDC site or tenderness. BUN/Cr 14/3.1.     Past Medical History:   Diagnosis Date    Alcoholic hepatitis with ascites     Alcoholic hepatitis with ascites     History of hematemesis 9/28/2017    Hypertension     Renal disorder        Past Surgical History:   Procedure Laterality Date    APPENDECTOMY         Review of patient's allergies indicates:   Allergen Reactions    Bactrim [sulfamethoxazole-trimethoprim] Other (See Comments)     Mookie Trell Syndrome     Current Facility-Administered Medications   Medication  Frequency    0.9%  NaCl infusion PRN    0.9%  NaCl infusion Once    acetaminophen tablet 650 mg Q8H PRN    aspirin EC tablet 81 mg Daily    atovaquone suspension 1,500 mg Daily    cycloSPORINE modified (NEORAL) capsule 200 mg BID    [START ON 11/21/2017] ergocalciferol capsule 50,000 Units Q7 Days    fluconazole tablet 200 mg Daily    levETIRAcetam tablet 500 mg BID    multivitamin tablet 1 tablet Daily    ondansetron disintegrating tablet 8 mg Q8H PRN    oxyCODONE-acetaminophen  mg per tablet 1 tablet Q4H PRN    pantoprazole EC tablet 40 mg Daily    predniSONE tablet 10 mg Daily    [START ON 11/19/2017] predniSONE tablet 5 mg Daily    ramelteon tablet 8 mg Nightly PRN    sodium chloride 0.9% flush 3 mL PRN    thiamine tablet 100 mg Daily    valganciclovir 50 mg/ml oral solution 100 mg Every Mon, Wed, Fri    vancomycin (VANCOCIN) 1,250 mg in dextrose 5 % 250 mL IVPB Once     Family History     None        Social History Main Topics    Smoking status: Former Smoker     Types: Cigarettes    Smokeless tobacco: Never Used    Alcohol use No      Comment: a fifth of liquor daily for years, cut back over last 2 months    Drug use: No    Sexual activity: Not on file     Review of Systems   Constitutional: Positive for activity change, chills, fatigue and fever.   HENT: Negative.    Eyes: Negative.    Respiratory: Negative.    Cardiovascular: Positive for leg swelling. Negative for chest pain.   Gastrointestinal: Positive for abdominal distention and abdominal pain.   Endocrine: Negative.    Genitourinary: Negative for difficulty urinating and flank pain.   Musculoskeletal: Negative.    Skin: Positive for color change and pallor.   Neurological: Negative.    Psychiatric/Behavioral: Negative.      Objective:     Vital Signs (Most Recent):  Temp: 98.8 °F (37.1 °C) (11/17/17 1002)  Pulse: 100 (11/17/17 1002)  Resp: 16 (11/17/17 1002)  BP: 117/66 (11/17/17 1002)  SpO2: 98 % (11/17/17 1002)  O2  Device (Oxygen Therapy): room air (11/17/17 0615) Vital Signs (24h Range):  Temp:  [98.8 °F (37.1 °C)-99.3 °F (37.4 °C)] 98.8 °F (37.1 °C)  Pulse:  [] 100  Resp:  [16-20] 16  SpO2:  [97 %-99 %] 98 %  BP: (114-121)/(61-69) 117/66     Weight: 80.3 kg (177 lb) (11/17/17 0615)  Body mass index is 28.57 kg/m².  Body surface area is 1.93 meters squared.    No intake/output data recorded.    Physical Exam   Constitutional: He is oriented to person, place, and time. He appears well-developed and well-nourished.   HENT:   Head: Atraumatic.   Eyes: EOM are normal. Scleral icterus is present.   Neck: Neck supple.   Pulmonary/Chest: Effort normal and breath sounds normal.   RLL diminished otherwise clear   Abdominal: Soft. Bowel sounds are normal. He exhibits distension. There is tenderness.   Wound vac intact   Musculoskeletal: Normal range of motion. He exhibits edema (pitting ).   Neurological: He is alert and oriented to person, place, and time.   Skin: Skin is warm and dry.   Psychiatric: He has a normal mood and affect.       Significant Labs:  All labs within the past 24 hours have been reviewed.    Significant Imaging:  Labs: Reviewed    Assessment/Plan:     LAURA (acute kidney injury)              Acute renal failure with tubular necrosis    LAURA secondary to hypotension/ischemic and toxic ATN now dialysis dependent   -HD [facility] MWF 3.5 hrs via R IJ TDC at [facility]. Last HD 11/5 pre wt 83 kg post wt 81.2 kg. EDW TBD. Residual function varies 150-500 ml/day with home lasix 80 mg daily.  -BUN/Cr 14/3.1 Cr down from previous. K 3.3 otherwise lytes and acid base stable.  - ml despite lasix 100 mg x 1 in ED this am.      -HD today. Dialysate adjusted to current labs. UF 4 L as tolerated     Fever  -Denies any drainage from TDC site or tenderness.   -Blood cultures pending. Vancomycin IV given in ED.   -Obtain UA and UC  -Liver transplant team following.     Anemia  -hgb 7.6 from 8.3 on 11/8.    -vitamin b  12 and folate normal.   -Obtain iron studies. Will review out patient HD flowsheet and start VIDA accordingly    BMD  -Phos 2.7. No binders for now.             Thank you for your consult. I will follow-up with patient. Please contact us if you have any additional questions.    Kay Guillen, NP  Nephrology  [facility]      I have reviewed and concur with the NP's history, physical, assessment, and plan. I have personally interviewed and examined the patient at bedside. See below addendum for my evaluation and additional findings, we will check his   Creatinine clearance next week to see if there is any renal recovery.

## 2017-11-17 NOTE — ASSESSMENT & PLAN NOTE
LAURA secondary to hypotension/ischemic and toxic ATN now dialysis dependent   -HD [facility] MWF 3.5 hrs via R IJ TDC at [facility]. Last HD 11/5 pre wt 83 kg post wt 81.2 kg. EDW TBD. Residual function varies 150-500 ml/day with home lasix 80 mg daily.  -BUN/Cr 14/3.1 Cr down from previous. K 3.3 otherwise lytes and acid base stable.  -Will discuss with staff hold HD today and start lasix 120 mg IV BID. If increase UOP, then obtain 24 hr CrCl. Monitor for renal recovery.       Fever  -Denies any drainage from TDC site or tenderness.   -Blood cultures pending. Vancomycin IV given in ED.   -Obtain UA and UC  -Liver transplant team following.      Anemia  -hgb 7.6 from 8.3 on 11/8.    -vitamin b 12 and folate normal.   -Obtain iron studies. Will review out patient HD flowsheet and start VIDA accordingly     BMD  -Phos 2.7. No binders for now.

## 2017-11-17 NOTE — ED NOTES
AAO--c/o right sided pain and headache.Sclera of eyes ,yellow. Moves all extremities. 3+ edema noted in lower extremities. Wife at bedside

## 2017-11-17 NOTE — ED TRIAGE NOTES
Pt reports he woke up this am and felt bad all over. Pt states he took Percocet about thirty minutes prior to coming to ER. Pt states he had body aches and a temp of 100.9. Pt had a liver txp recently. Pt also reports right sided abdominal pain. Pt has drain pump to the wound.

## 2017-11-17 NOTE — PROGRESS NOTES
Asked to change wound vac to chevron wound . Pt is off of the floor in dialysis . Awaiting wound vac pump from central supply to be delivered to the unit.   Charge nurse and staff nurse available to apply if wound care gone for the day.  Zoë Murphy RN CWON  [phone]

## 2017-11-17 NOTE — SUBJECTIVE & OBJECTIVE
Scheduled Meds:   sodium chloride 0.9%   Intravenous Once    aspirin  81 mg Oral Daily    atovaquone  1,500 mg Oral Daily    cycloSPORINE modified (NEORAL)  200 mg Oral BID    [START ON 11/21/2017] ergocalciferol  50,000 Units Oral Q7 Days    fluconazole  200 mg Oral Daily    levETIRAcetam  500 mg Oral BID    multivitamin  1 tablet Oral Daily    pantoprazole  40 mg Oral Daily    predniSONE  10 mg Oral Daily    [START ON 11/19/2017] predniSONE  5 mg Oral Daily    thiamine  100 mg Oral Daily    valganciclovir 50 mg/ml  100 mg Oral Every Mon, Wed, Fri     Continuous Infusions:   PRN Meds:sodium chloride 0.9%, acetaminophen, ondansetron, oxyCODONE-acetaminophen, ramelteon, sodium chloride 0.9%    Review of Systems   Constitutional: Positive for activity change (decreased), appetite change (improving) and fever. Negative for chills, diaphoresis and fatigue.   HENT: Negative for congestion, mouth sores and trouble swallowing.    Eyes: Negative for visual disturbance.   Respiratory: Negative for cough, choking, shortness of breath and wheezing.    Cardiovascular: Negative for chest pain, palpitations and leg swelling.   Gastrointestinal: Positive for abdominal distention and abdominal pain. Negative for constipation, diarrhea, nausea and vomiting.   Endocrine: Negative.    Genitourinary: Positive for flank pain. Negative for decreased urine volume, difficulty urinating, dysuria, hematuria and urgency.   Musculoskeletal: Negative for arthralgias, back pain and joint swelling.   Skin: Positive for wound. Negative for color change, pallor and rash.   Allergic/Immunologic: Positive for immunocompromised state.   Neurological: Positive for weakness. Negative for dizziness, tremors, seizures, syncope and headaches.   Hematological: Bruises/bleeds easily.   Psychiatric/Behavioral: Positive for sleep disturbance. Negative for agitation, confusion, decreased concentration, dysphoric mood, hallucinations and suicidal  ideas. The patient is not nervous/anxious.    All other systems reviewed and are negative.    Objective:     Vital Signs (Most Recent):  Temp: 99.1 °F (37.3 °C) (11/17/17 1243)  Pulse: 98 (11/17/17 1243)  Resp: 16 (11/17/17 1243)  BP: 131/74 (11/17/17 1243)  SpO2: 98 % (11/17/17 1243) Vital Signs (24h Range):  Temp:  [98.8 °F (37.1 °C)-99.3 °F (37.4 °C)] 99.1 °F (37.3 °C)  Pulse:  [] 98  Resp:  [16-20] 16  SpO2:  [97 %-99 %] 98 %  BP: (114-131)/(61-74) 131/74     Weight: 80.3 kg (177 lb)  Body mass index is 28.57 kg/m².    Intake/Output - Last 3 Shifts       11/15 0700 - 11/16 0659 11/16 0700 - 11/17 0659 11/17 0700 - 11/18 0659    Urine (mL/kg/hr)   125 (0.2)    Total Output     125    Net     -125                 Physical Exam   Constitutional: He is oriented to person, place, and time. He appears well-developed and well-nourished. No distress.   Temporal and distal extremity muscle wasting   HENT:   Head: Normocephalic and atraumatic.   Mouth/Throat: No oropharyngeal exudate.   Eyes: EOM are normal. Pupils are equal, round, and reactive to light. Scleral icterus is present.   Neck: Normal range of motion. Neck supple.   Cardiovascular: Normal rate, regular rhythm, normal heart sounds and intact distal pulses.  Exam reveals no gallop and no friction rub.    No murmur heard.  Pulmonary/Chest: Effort normal and breath sounds normal. No respiratory distress. He has no wheezes. He has no rales. He exhibits no tenderness.   Dec R base   Abdominal: Soft. Bowel sounds are normal. He exhibits no distension. There is tenderness. There is no rebound and no guarding.       Chevron incision opened, drainage yellow/brown    Musculoskeletal: Normal range of motion. He exhibits edema (Gen +2-3).   Neurological: He is alert and oriented to person, place, and time.   Skin: Skin is warm and dry. Capillary refill takes 2 to 3 seconds. He is not diaphoretic. There is erythema (ble, rlq abdomen).   Psychiatric: He has a normal  mood and affect. His behavior is normal. Judgment and thought content normal.   Nursing note and vitals reviewed.      Laboratory:  Immunosuppressants         Stop Route Frequency     cycloSPORINE modified (NEORAL) capsule 200 mg      -- Oral 2 times daily        CBC:     Recent Labs  Lab 11/17/17  0632   WBC 4.70   RBC 2.53*   HGB 7.6*   HCT 23.7*      MCV 94   MCH 30.0   MCHC 32.1     CMP:     Recent Labs  Lab 11/17/17  0632   GLU 86   CALCIUM 8.5*   ALBUMIN 2.3*   PROT 5.1*      K 3.3*   CO2 29   CL 98   BUN 14   CREATININE 3.1*   ALKPHOS 129   ALT 7*   AST 14   BILITOT 2.4*     Coagulation:     Recent Labs  Lab 11/17/17  0632   INR 1.1   APTT <21.0     Labs within the past 24 hours have been reviewed.    Diagnostic Results:  I have personally reviewed all pertinent imaging studies.

## 2017-11-17 NOTE — ASSESSMENT & PLAN NOTE
- et nurse consulted, dressing due to be changed today  - wound erythematous, give dose of vancomycin

## 2017-11-17 NOTE — PROGRESS NOTES
Admit/Discharge Note:     Met with patient and mother to assess needs. Patient is a 27 y.o. single male, admitted for post liver transplantation on 10/19/17 due to fever and cellulitis around wound.      Patient admitted from [facility] on 11/17/2017 .  At this time, patient presents as alert and oriented x 4, pleasant, good eye contact, well groomed, recall good, concentration/judgement good, average intelligence, calm, communicative, cooperative and asking and answering questions appropriately.  At this time, patients caregiver presents as alert and oriented x 4, pleasant, good eye contact, well groomed, recall good, concentration/judgement good, average intelligence, calm, communicative, cooperative and asking and answering questions appropriately.    Household/Family Systems     Patient resides with patient's mother, at [address]  [place] [address].  Support system includes pt's friend and former roommate Kaila Schwarz, pt's mother Sanjuanita, sister Miranda, and aunt Sophy.  Patient does not have dependents that are need of being cared for.     Patients primary caregiver is Sanjuanita Diana, patients mother, phone number [phone].  Confirmed patients contact information is [phone] (home);   Telephone Information:   Mobile [phone]       During admission, patient's caregiver plans to stay in patient's room.  Confirmed patient and patients caregivers do have access to reliable transportation.    Cognitive Status/Learning     Patient reports reading ability as college and states patient does not have difficulty with N/A.  Patient reports patient learns best by written, verbal and demonstration.   Needed: No.   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 Pt is currently on unpaid leave through [facility] but does have STD  "that would allow him 60% of his salary. Pt reports that he has not yet received STD. Mother and pt are still attempting to get this taken care of. Mother reports that company is paying for pt's [facility] health insurance premium. Pt reports that once his FMLA is up on Nov 28th, they may stop paying premium. Pt and mother report that pt will make sure to pay health insurance if needed.     Adherence     Patient reports a high level of adherence to patients health care regimen.  Adherence counseling and education provided. Patient verbalizes understanding.    Substance Use    Patient reports the following substance usage.    Tobacco: none, patient denies any use.  Alcohol: none, patient denies any use. Pt's last ETOH use was August 2017, when he became acutely sick with ETOH cirrhosis. Priot to quitting, pt would drink 1/5th of whiskey daily from [id number]. Pt attempted to stop drinking in 2016 by tapering down to 4-5 "stiff" drinks of whiskey per day and in the beginning of 2017 was down to a glass of wine a day. Pt attributes ETOH use to job stressors. Pt reports that he will enroll in [facility] upon discharge from hospital. EZEKIEL provided pt with contact ([phone] x2). EZEKIEL notified pt that SW team would keep tabs on pt's enrollment and completion of program.  Illicit Drugs/Non-prescribed Medications: none, patient denies any use. Pt reports brief addiction to Lortabs in 2011 right after college.  Patient states clear understanding of the potential impact of substance use.  Substance abstinence/cessation counseling, education and resources provided and reviewed.     Services Utilizing/ADLS    Infusion Service: Prior to admission, patient utilizing? no  Home Health: Prior to admission, patient utilizing? yes; [facility] for SN and PT; EZEKIEL contacted Liana at [phone] to confirm pt's admission and possible d/c tomorrow or Sunday. Liana confirmed that orders will need to be placed if pt is in the hospital for more than 24 hours. EZEKIEL " notified Seda Camacho NP.   DME: Prior to admission, yes; pt has [employer] wound vac, rolling walker, and a w/c  Pulmonary/Cardiac Rehab: Prior to admission, no  Dialysis:  Prior to admission, yes; Pt attends outpt acute HD [facility] M[facility] at 3pm. Pt aware that Thanksgiving schedule is Sun, Tues, and Fri of next week at 3pm. SW notified Chano at [facility] that pt currently in hospital and could be d/c'd over the weekend.  Transplant Specialty Pharmacy:  Prior to admission, yes; [facility].    Prior to admission, patient reports patient was independent with ADLS and was not driving. Pt's mother assists with transitions. Patient reports patient is not able to care for self at this time due to compromised medical condition (as documented in medical record) and physical weakness..  Patient indicates a willingness to care for self once medically cleared to do so.    Insurance/Medications    Insured by   Payor/Plan Subscr  Sex Relation Sub. Ins. ID Effective Group Num   1. [employer]* RAFFY DIANA 1990 Male  [id number]                                   [address] [address]   2. [employer]* RAFFY DIANA 1990 Male  [id number]                                   [address]      Primary Insurance (for UNOS reporting): Private Insurance-[employer]   Secondary Insurance (for UNOS reporting): None    Patient reports patient is able to obtain and afford medications at this time and at time of discharge.    Living Will/Healthcare Power of     Patient states patient has a LW and/or HCPA.  Pt's mother Sanjuanita Diana is pt's HCPA.     Coping/Mental Health    Patient is coping adequately with the aid of  family members and friends. Patient indicates mental health difficulties and reports a history of social anxiety. Pt reports that he is open to learning new coping mechanisms at [facility].     Discharge Planning    At time of discharge, patient plans to return to [facility] apartments under the  care of pt's mother.  Patients mother will transport patient.  Per rounds today, expected discharge date is this weekend or early next week. Patient and patients caregiver  verbalize understanding and are involved in treatment planning and discharge process.    Additional Concerns  SW provided contact information to [facility] so that pt can initiate enrollment. Pt expressed understanding. Patient's caretaker denies additional needs and/or concerns at this time. Patient is being followed for needs, education, resources, information, emotional support, supportive counseling, and for supportive and skilled discharge plan of care.  providing ongoing psychosocial support, education, resources and d/c planning as needed.  SW remains available.  provided resource list, patient choice, psychosocial and supportive counseling, resources, education, assistance and discharge planning with patient and caregiver involvement, ongoing SW availability and services as appropriate.

## 2017-11-18 PROBLEM — R53.81 PHYSICAL DECONDITIONING: Status: RESOLVED | Noted: 2017-10-26 | Resolved: 2017-11-18

## 2017-11-18 PROBLEM — T14.8XXD DELAYED WOUND HEALING: Status: RESOLVED | Noted: 2017-11-17 | Resolved: 2017-11-18

## 2017-11-18 PROBLEM — D63.8 ANEMIA OF CHRONIC DISEASE: Status: ACTIVE | Noted: 2017-11-18

## 2017-11-18 PROBLEM — R90.89 ABNORMAL FINDING ON MRI OF BRAIN: Status: RESOLVED | Noted: 2017-11-02 | Resolved: 2017-11-18

## 2017-11-18 PROBLEM — E83.42 HYPOMAGNESEMIA: Status: ACTIVE | Noted: 2017-11-18

## 2017-11-18 LAB
ALBUMIN SERPL BCP-MCNC: 2.1 G/DL
ALP SERPL-CCNC: 131 U/L
ALT SERPL W/O P-5'-P-CCNC: 6 U/L
ANION GAP SERPL CALC-SCNC: 8 MMOL/L
AST SERPL-CCNC: 13 U/L
BACTERIA UR CULT: NO GROWTH
BASOPHILS # BLD AUTO: 0.04 K/UL
BASOPHILS NFR BLD: 0.8 %
BILIRUB SERPL-MCNC: 2.3 MG/DL
BLD PROD TYP BPU: NORMAL
BLOOD UNIT EXPIRATION DATE: NORMAL
BLOOD UNIT TYPE CODE: 5100
BLOOD UNIT TYPE: NORMAL
BUN SERPL-MCNC: 11 MG/DL
CALCIUM SERPL-MCNC: 8.2 MG/DL
CHLORIDE SERPL-SCNC: 103 MMOL/L
CO2 SERPL-SCNC: 26 MMOL/L
CODING SYSTEM: NORMAL
CREAT SERPL-MCNC: 2.6 MG/DL
CYCLOSPORINE BLD LC/MS/MS-MCNC: 336 NG/ML
DIFFERENTIAL METHOD: ABNORMAL
DISPENSE STATUS: NORMAL
EOSINOPHIL # BLD AUTO: 0 K/UL
EOSINOPHIL NFR BLD: 0.2 %
ERYTHROCYTE [DISTWIDTH] IN BLOOD BY AUTOMATED COUNT: 18.5 %
EST. GFR  (AFRICAN AMERICAN): 37.1 ML/MIN/1.73 M^2
EST. GFR  (NON AFRICAN AMERICAN): 32.1 ML/MIN/1.73 M^2
GLUCOSE SERPL-MCNC: 86 MG/DL
HCT VFR BLD AUTO: 22.8 %
HGB BLD-MCNC: 7.4 G/DL
IMM GRANULOCYTES # BLD AUTO: 0.03 K/UL
IMM GRANULOCYTES NFR BLD AUTO: 0.6 %
LYMPHOCYTES # BLD AUTO: 1.1 K/UL
LYMPHOCYTES NFR BLD: 21.2 %
MAGNESIUM SERPL-MCNC: 1.7 MG/DL
MCH RBC QN AUTO: 30.5 PG
MCHC RBC AUTO-ENTMCNC: 32.5 G/DL
MCV RBC AUTO: 94 FL
MONOCYTES # BLD AUTO: 1.1 K/UL
MONOCYTES NFR BLD: 20.2 %
NEUTROPHILS # BLD AUTO: 3 K/UL
NEUTROPHILS NFR BLD: 57 %
NRBC BLD-RTO: 0 /100 WBC
PHOSPHATE SERPL-MCNC: 1.9 MG/DL
PLATELET # BLD AUTO: 317 K/UL
PMV BLD AUTO: 9.5 FL
POTASSIUM SERPL-SCNC: 3.5 MMOL/L
PROT SERPL-MCNC: 4.7 G/DL
RBC # BLD AUTO: 2.43 M/UL
SODIUM SERPL-SCNC: 137 MMOL/L
TRANS ERYTHROCYTES VOL PATIENT: NORMAL ML
VANCOMYCIN SERPL-MCNC: 13.6 UG/ML
WBC # BLD AUTO: 5.29 K/UL

## 2017-11-18 PROCEDURE — 99233 SBSQ HOSP IP/OBS HIGH 50: CPT | Mod: 24,,, | Performed by: NURSE PRACTITIONER

## 2017-11-18 PROCEDURE — 80158 DRUG ASSAY CYCLOSPORINE: CPT

## 2017-11-18 PROCEDURE — 80202 ASSAY OF VANCOMYCIN: CPT

## 2017-11-18 PROCEDURE — 25000003 PHARM REV CODE 250: Performed by: NURSE PRACTITIONER

## 2017-11-18 PROCEDURE — 83735 ASSAY OF MAGNESIUM: CPT

## 2017-11-18 PROCEDURE — 85025 COMPLETE CBC W/AUTO DIFF WBC: CPT

## 2017-11-18 PROCEDURE — 84100 ASSAY OF PHOSPHORUS: CPT

## 2017-11-18 PROCEDURE — 63600175 PHARM REV CODE 636 W HCPCS: Performed by: NURSE PRACTITIONER

## 2017-11-18 PROCEDURE — 20600001 HC STEP DOWN PRIVATE ROOM

## 2017-11-18 PROCEDURE — 80053 COMPREHEN METABOLIC PANEL: CPT

## 2017-11-18 PROCEDURE — 36430 TRANSFUSION BLD/BLD COMPNT: CPT

## 2017-11-18 PROCEDURE — 25000003 PHARM REV CODE 250: Performed by: STUDENT IN AN ORGANIZED HEALTH CARE EDUCATION/TRAINING PROGRAM

## 2017-11-18 PROCEDURE — P9021 RED BLOOD CELLS UNIT: HCPCS

## 2017-11-18 RX ORDER — HYDROMORPHONE HYDROCHLORIDE 1 MG/ML
0.5 INJECTION, SOLUTION INTRAMUSCULAR; INTRAVENOUS; SUBCUTANEOUS ONCE
Status: COMPLETED | OUTPATIENT
Start: 2017-11-18 | End: 2017-11-18

## 2017-11-18 RX ORDER — AMOXICILLIN AND CLAVULANATE POTASSIUM 875; 125 MG/1; MG/1
1 TABLET, FILM COATED ORAL EVERY 12 HOURS
Status: DISCONTINUED | OUTPATIENT
Start: 2017-11-18 | End: 2017-11-21

## 2017-11-18 RX ORDER — MAGNESIUM SULFATE HEPTAHYDRATE 40 MG/ML
2 INJECTION, SOLUTION INTRAVENOUS ONCE
Status: COMPLETED | OUTPATIENT
Start: 2017-11-18 | End: 2017-11-18

## 2017-11-18 RX ORDER — HYDROCODONE BITARTRATE AND ACETAMINOPHEN 500; 5 MG/1; MG/1
TABLET ORAL
Status: DISCONTINUED | OUTPATIENT
Start: 2017-11-18 | End: 2017-11-21 | Stop reason: HOSPADM

## 2017-11-18 RX ORDER — SODIUM,POTASSIUM PHOSPHATES 280-250MG
2 POWDER IN PACKET (EA) ORAL 2 TIMES DAILY
Status: DISCONTINUED | OUTPATIENT
Start: 2017-11-18 | End: 2017-11-19

## 2017-11-18 RX ADMIN — OXYCODONE AND ACETAMINOPHEN 1 TABLET: 10; 325 TABLET ORAL at 03:11

## 2017-11-18 RX ADMIN — AMOXICILLIN AND CLAVULANATE POTASSIUM 1 TABLET: 875; 125 TABLET, FILM COATED ORAL at 08:11

## 2017-11-18 RX ADMIN — MAGNESIUM SULFATE IN WATER 2 G: 40 INJECTION, SOLUTION INTRAVENOUS at 08:11

## 2017-11-18 RX ADMIN — HYDROMORPHONE HYDROCHLORIDE 0.5 MG: 1 INJECTION, SOLUTION INTRAMUSCULAR; INTRAVENOUS; SUBCUTANEOUS at 10:11

## 2017-11-18 RX ADMIN — FUROSEMIDE 120 MG: 10 INJECTION, SOLUTION INTRAVENOUS at 08:11

## 2017-11-18 RX ADMIN — OXYCODONE AND ACETAMINOPHEN 1 TABLET: 10; 325 TABLET ORAL at 01:11

## 2017-11-18 RX ADMIN — PREDNISONE 10 MG: 10 TABLET ORAL at 08:11

## 2017-11-18 RX ADMIN — ASPIRIN 81 MG: 81 TABLET, COATED ORAL at 08:11

## 2017-11-18 RX ADMIN — ATOVAQUONE 1500 MG: 750 SUSPENSION ORAL at 08:11

## 2017-11-18 RX ADMIN — OXYCODONE AND ACETAMINOPHEN 1 TABLET: 10; 325 TABLET ORAL at 06:11

## 2017-11-18 RX ADMIN — OXYCODONE AND ACETAMINOPHEN 1 TABLET: 10; 325 TABLET ORAL at 08:11

## 2017-11-18 RX ADMIN — OXYCODONE AND ACETAMINOPHEN 1 TABLET: 10; 325 TABLET ORAL at 11:11

## 2017-11-18 RX ADMIN — DOCUSATE SODIUM 100 MG: 100 CAPSULE, LIQUID FILLED ORAL at 05:11

## 2017-11-18 RX ADMIN — LEVETIRACETAM 500 MG: 500 TABLET ORAL at 08:11

## 2017-11-18 RX ADMIN — CYCLOSPORINE 150 MG: 100 CAPSULE, LIQUID FILLED ORAL at 05:11

## 2017-11-18 RX ADMIN — DOCUSATE SODIUM 100 MG: 100 CAPSULE, LIQUID FILLED ORAL at 01:11

## 2017-11-18 RX ADMIN — CYCLOSPORINE 200 MG: 100 CAPSULE, LIQUID FILLED ORAL at 08:11

## 2017-11-18 RX ADMIN — THERA TABS 1 TABLET: TAB at 08:11

## 2017-11-18 RX ADMIN — FUROSEMIDE 120 MG: 10 INJECTION, SOLUTION INTRAVENOUS at 05:11

## 2017-11-18 RX ADMIN — THIAMINE HCL (VITAMIN B1) 50 MG TABLET 100 MG: at 08:11

## 2017-11-18 RX ADMIN — PANTOPRAZOLE SODIUM 40 MG: 40 TABLET, DELAYED RELEASE ORAL at 08:11

## 2017-11-18 RX ADMIN — FLUCONAZOLE 200 MG: 200 TABLET ORAL at 08:11

## 2017-11-18 RX ADMIN — DOCUSATE SODIUM 100 MG: 100 CAPSULE, LIQUID FILLED ORAL at 08:11

## 2017-11-18 NOTE — ASSESSMENT & PLAN NOTE
- infectious work up initiated, blood cultures ngtd. Urine cx pending.  - Vanc x 1 dose given.  - afebrile since admit.   - Start Augmentin q12h.

## 2017-11-18 NOTE — SUBJECTIVE & OBJECTIVE
Scheduled Meds:   amoxicillin-clavulanate 875-125mg  1 tablet Oral Q12H    aspirin  81 mg Oral Daily    atovaquone  1,500 mg Oral Daily    cycloSPORINE modified (NEORAL)  150 mg Oral BID    docusate sodium  100 mg Oral TID    [START ON 11/21/2017] ergocalciferol  50,000 Units Oral Q7 Days    fluconazole  200 mg Oral Daily    furosemide  120 mg Intravenous BID    levETIRAcetam  500 mg Oral BID    multivitamin  1 tablet Oral Daily    pantoprazole  40 mg Oral Daily    potassium, sodium phosphates  2 packet Oral BID    predniSONE  10 mg Oral Daily    [START ON 11/19/2017] predniSONE  5 mg Oral Daily    thiamine  100 mg Oral Daily    valganciclovir 50 mg/ml  100 mg Oral Every Mon, Wed, Fri     Continuous Infusions:   PRN Meds:sodium chloride, sodium chloride 0.9%, acetaminophen, heparin (porcine), ondansetron, oxyCODONE-acetaminophen, ramelteon, sodium chloride 0.9%    Review of Systems   Constitutional: Positive for appetite change (improving). Negative for activity change, chills, diaphoresis, fatigue and fever.   HENT: Negative for congestion, mouth sores and trouble swallowing.    Eyes: Negative for pain, discharge and visual disturbance.   Respiratory: Negative for cough, chest tightness, shortness of breath and wheezing.    Cardiovascular: Negative for chest pain, palpitations and leg swelling.   Gastrointestinal: Positive for abdominal distention and abdominal pain. Negative for constipation, diarrhea, nausea and vomiting.   Endocrine: Negative.    Genitourinary: Positive for flank pain. Negative for decreased urine volume, difficulty urinating, dysuria, hematuria and urgency.   Musculoskeletal: Negative for arthralgias, back pain and joint swelling.   Skin: Positive for wound. Negative for color change, pallor and rash.   Allergic/Immunologic: Positive for immunocompromised state.   Neurological: Positive for weakness. Negative for dizziness, tremors, seizures, syncope and headaches.    Hematological: Bruises/bleeds easily.   Psychiatric/Behavioral: Positive for sleep disturbance. Negative for agitation, confusion, decreased concentration, dysphoric mood, hallucinations and suicidal ideas. The patient is not nervous/anxious.    All other systems reviewed and are negative.    Objective:     Vital Signs (Most Recent):  Temp: 98.9 °F (37.2 °C) (11/18/17 1136)  Pulse: 102 (11/18/17 1136)  Resp: 18 (11/18/17 1136)  BP: 113/67 (11/18/17 1136)  SpO2: 97 % (11/18/17 1136) Vital Signs (24h Range):  Temp:  [98.2 °F (36.8 °C)-99.5 °F (37.5 °C)] 98.9 °F (37.2 °C)  Pulse:  [] 102  Resp:  [15-20] 18  SpO2:  [97 %-100 %] 97 %  BP: (108-138)/(50-80) 113/67     Weight: 84 kg (185 lb 3 oz)  Body mass index is 29 kg/m².    Intake/Output - Last 3 Shifts       11/16 0700 - 11/17 0659 11/17 0700 - 11/18 0659 11/18 0700 - 11/19 0659    P.O.  270     Other  600     Total Intake(mL/kg)  870 (10.4)     Urine (mL/kg/hr)  625 (0.3)     Other  804 (0.4)     Stool  0 (0)     Total Output   1429      Net   -559             Stool Occurrence  0 x           Physical Exam   Constitutional: He is oriented to person, place, and time. He appears well-developed and well-nourished. No distress.   Temporal and distal extremity muscle wasting   HENT:   Head: Normocephalic and atraumatic.   Mouth/Throat: No oropharyngeal exudate.   Eyes: EOM are normal. Pupils are equal, round, and reactive to light. Scleral icterus is present.   Neck: Normal range of motion. Neck supple.   Cardiovascular: Normal rate, regular rhythm, normal heart sounds and intact distal pulses.  Exam reveals no gallop and no friction rub.    No murmur heard.  Pulmonary/Chest: Effort normal and breath sounds normal. No respiratory distress. He has no wheezes. He has no rales. He exhibits no tenderness.   Dec R base   Abdominal: Soft. Bowel sounds are normal. He exhibits no distension. There is tenderness. There is no rebound and no guarding.       Chevron incision  opened, drainage yellow/brown    Musculoskeletal: Normal range of motion. He exhibits edema (Gen +2-3).   Neurological: He is alert and oriented to person, place, and time.   Skin: Skin is warm and dry. Capillary refill takes 2 to 3 seconds. He is not diaphoretic. There is erythema (ble, rlq abdomen).   Psychiatric: He has a normal mood and affect. His behavior is normal. Judgment and thought content normal.   Nursing note and vitals reviewed.      Laboratory:  Immunosuppressants         Stop Route Frequency     cycloSPORINE modified capsule 150 mg      -- Oral 2 times daily        CBC:   Recent Labs  Lab 11/18/17 0622   WBC 5.29   RBC 2.43*   HGB 7.4*   HCT 22.8*      MCV 94   MCH 30.5   MCHC 32.5     CMP:   Recent Labs  Lab 11/18/17 0622   GLU 86   CALCIUM 8.2*   ALBUMIN 2.1*   PROT 4.7*      K 3.5   CO2 26      BUN 11   CREATININE 2.6*   ALKPHOS 131   ALT 6*   AST 13   BILITOT 2.3*     Labs within the past 24 hours have been reviewed.    Diagnostic Results:  I have personally reviewed all pertinent imaging studies.

## 2017-11-18 NOTE — PROGRESS NOTES
[facility]  Liver Transplant  Progress Note    Patient Name: Jhonny Diana  MRN: [id number]  Admission Date: 2017  Hospital Length of Stay: 1 days  Code Status: Full Code  Primary Care Provider: Primary Doctor No  Post-Operative Day: 30    ORGAN:   LIVER  Disease Etiology: Acute Alcoholic Hepatitis  Donor Type:    - Brain Death  CDC High Risk:   No  Donor CMV Status:   Donor CMV Status: Positive  Donor HBcAB:   Negative  Donor HCV Status:   Negative  Whole or Partial: Whole Liver  Biliary Anastomosis: End to End  Arterial Anatomy: Standard  Subjective:     History of Present Illness:  26 y/o male with pmh of ESLD secondary ETOH.  Now s/p oltx 10/19/2017 (Liver), which took place without complication.  Steroid induction, CMV +/+.  Post op complicated by LAURA, seizures, and delayed wound healing.  Wound vac placed to chevron incision 10/29.  Presents to ED with 1 day history of low grade fever, tmax 100.9.  States have more abdominal pain, particularly rlq radiating to right flank.  Remains on HD outpatient MWF schedule via right subclavian permacath.  Reports making more urine and last HD 11/15.      Hospital Course:  Interval history: pt feeling well. Tolerated HD yesterday. Afebrile since admission. Tmax 99.5. Received Vanc x 1 dose. Will start Augmentin today for possible cellulitis. Suture x 2 removed from abdomen. H&H decreased. Transfuse 1 unit PRBCs. No acute events overnight. Monitor.     Scheduled Meds:   amoxicillin-clavulanate 875-125mg  1 tablet Oral Q12H    aspirin  81 mg Oral Daily    atovaquone  1,500 mg Oral Daily    cycloSPORINE modified (NEORAL)  150 mg Oral BID    docusate sodium  100 mg Oral TID    [START ON 2017] ergocalciferol  50,000 Units Oral Q7 Days    fluconazole  200 mg Oral Daily    furosemide  120 mg Intravenous BID    levETIRAcetam  500 mg Oral BID    multivitamin  1 tablet Oral Daily    pantoprazole  40 mg Oral Daily    potassium, sodium  phosphates  2 packet Oral BID    predniSONE  10 mg Oral Daily    [START ON 11/19/2017] predniSONE  5 mg Oral Daily    thiamine  100 mg Oral Daily    valganciclovir 50 mg/ml  100 mg Oral Every Mon, Wed, Fri     Continuous Infusions:   PRN Meds:sodium chloride, sodium chloride 0.9%, acetaminophen, heparin (porcine), ondansetron, oxyCODONE-acetaminophen, ramelteon, sodium chloride 0.9%    Review of Systems   Constitutional: Positive for appetite change (improving). Negative for activity change, chills, diaphoresis, fatigue and fever.   HENT: Negative for congestion, mouth sores and trouble swallowing.    Eyes: Negative for pain, discharge and visual disturbance.   Respiratory: Negative for cough, chest tightness, shortness of breath and wheezing.    Cardiovascular: Negative for chest pain, palpitations and leg swelling.   Gastrointestinal: Positive for abdominal distention and abdominal pain. Negative for constipation, diarrhea, nausea and vomiting.   Endocrine: Negative.    Genitourinary: Positive for flank pain. Negative for decreased urine volume, difficulty urinating, dysuria, hematuria and urgency.   Musculoskeletal: Negative for arthralgias, back pain and joint swelling.   Skin: Positive for wound. Negative for color change, pallor and rash.   Allergic/Immunologic: Positive for immunocompromised state.   Neurological: Positive for weakness. Negative for dizziness, tremors, seizures, syncope and headaches.   Hematological: Bruises/bleeds easily.   Psychiatric/Behavioral: Positive for sleep disturbance. Negative for agitation, confusion, decreased concentration, dysphoric mood, hallucinations and suicidal ideas. The patient is not nervous/anxious.    All other systems reviewed and are negative.    Objective:     Vital Signs (Most Recent):  Temp: 98.9 °F (37.2 °C) (11/18/17 1136)  Pulse: 102 (11/18/17 1136)  Resp: 18 (11/18/17 1136)  BP: 113/67 (11/18/17 1136)  SpO2: 97 % (11/18/17 1136) Vital Signs (24h  Range):  Temp:  [98.2 °F (36.8 °C)-99.5 °F (37.5 °C)] 98.9 °F (37.2 °C)  Pulse:  [] 102  Resp:  [15-20] 18  SpO2:  [97 %-100 %] 97 %  BP: (108-138)/(50-80) 113/67     Weight: 84 kg (185 lb 3 oz)  Body mass index is 29 kg/m².    Intake/Output - Last 3 Shifts       11/16 0700 - 11/17 0659 11/17 0700 - 11/18 0659 11/18 0700 - 11/19 0659    P.O.  270     Other  600     Total Intake(mL/kg)  870 (10.4)     Urine (mL/kg/hr)  625 (0.3)     Other  804 (0.4)     Stool  0 (0)     Total Output   1429      Net   -559             Stool Occurrence  0 x           Physical Exam   Constitutional: He is oriented to person, place, and time. He appears well-developed and well-nourished. No distress.   Temporal and distal extremity muscle wasting   HENT:   Head: Normocephalic and atraumatic.   Mouth/Throat: No oropharyngeal exudate.   Eyes: EOM are normal. Pupils are equal, round, and reactive to light. Scleral icterus is present.   Neck: Normal range of motion. Neck supple.   Cardiovascular: Normal rate, regular rhythm, normal heart sounds and intact distal pulses.  Exam reveals no gallop and no friction rub.    No murmur heard.  Pulmonary/Chest: Effort normal and breath sounds normal. No respiratory distress. He has no wheezes. He has no rales. He exhibits no tenderness.   Dec R base   Abdominal: Soft. Bowel sounds are normal. He exhibits no distension. There is tenderness. There is no rebound and no guarding.       Chevron incision opened, drainage yellow/brown    Musculoskeletal: Normal range of motion. He exhibits edema (Gen +2-3).   Neurological: He is alert and oriented to person, place, and time.   Skin: Skin is warm and dry. Capillary refill takes 2 to 3 seconds. He is not diaphoretic. There is erythema (ble, rlq abdomen).   Psychiatric: He has a normal mood and affect. His behavior is normal. Judgment and thought content normal.   Nursing note and vitals reviewed.      Laboratory:  Immunosuppressants         Stop Route  Frequency     cycloSPORINE modified capsule 150 mg      -- Oral 2 times daily        CBC:   Recent Labs  Lab 11/18/17  0622   WBC 5.29   RBC 2.43*   HGB 7.4*   HCT 22.8*      MCV 94   MCH 30.5   MCHC 32.5     CMP:   Recent Labs  Lab 11/18/17  0622   GLU 86   CALCIUM 8.2*   ALBUMIN 2.1*   PROT 4.7*      K 3.5   CO2 26      BUN 11   CREATININE 2.6*   ALKPHOS 131   ALT 6*   AST 13   BILITOT 2.3*     Labs within the past 24 hours have been reviewed.    Diagnostic Results:  I have personally reviewed all pertinent imaging studies.    Assessment/Plan:     * Fever    - infectious work up initiated, blood cultures ngtd. Urine cx pending.  - Vanc x 1 dose given.  - afebrile since admit.   - Start Augmentin q12h.           LAURA (acute kidney injury)    - nephrology consulted  - HD outpatient MWF via permacath  - making more urine. Lasix trial given yesterday with 625 ml output/24hr.           Delayed surgical wound healing    - et nurse consulted, dressing changed 11/17  - sutures removed from abdomin and right groin  - wound erythematous, give dose of vancomycin 11/17. Start Augmentin today.          Liver transplanted    - LFts stable, monitor labs daily          Long-term use of immunosuppressant medication    - continue cyclosporine and prednisone  - monitor cyclosporine levels daily and adjust for therapeutic dose  - hold cellcept due to likely infection          Prophylactic immunotherapy    - see long term immunosuppression          At risk for opportunistic infections    - continue valcyte for CMV prophylaxis and atovaquone for pcp prophylaxis          Seizure    - continue keppra          Anemia of chronic disease    - H&H 7.4/22.8. Transfuse 1 unit PRBCs.           Hypomagnesemia    - Mag 2 gm IV.              VTE Risk Mitigation         Ordered     heparin (porcine) injection 1,000 Units  As needed (PRN)     Route:  Intra-Catheter        11/17/17 1628     Medium Risk of VTE  Once      11/17/17  0855     Place sequential compression device  Until discontinued      11/17/17 0855          The patients clinical status was discussed at multidisplinary rounds, involving transplant surgery, transplant medicine, pharmacy, nursing, nutrition, and social work    Discharge Planning: not a candidate for dc at this time.  Monitor kidney function --> may need outpt HD  Monitor HH needs vs rehab for deconditioned status      Mandy Crespo NP  Liver Transplant  [facility]

## 2017-11-18 NOTE — ASSESSMENT & PLAN NOTE
- et nurse consulted, dressing changed 11/17  - sutures removed from abdomin and right groin  - wound erythematous, give dose of vancomycin 11/17. Start Augmentin today.

## 2017-11-18 NOTE — PROGRESS NOTES
Wound Vac dressing to chevron incision changed tonight, 11/17, at 21:30 by Charge Nurse, Elsy, and this RN.    Pt premedicated with a 1 time dose of 1 mg IV dilaudid before proceeding with dressing change.     Old black foam removed.  Wound cleaned with NS.  New black foam placed.    Pt tolerated well.    WV set to 125 continuous suction.

## 2017-11-18 NOTE — PLAN OF CARE
Problem: Patient Care Overview  Goal: Plan of Care Review  Outcome: Ongoing (interventions implemented as appropriate)    Pt AAOx4 with mother at the bedside.  Pt mother is attentive to and supportive of pt and actively involved in pt care.    Pt admitted through ED yesterday with low grade fever( tmax 100.9) & c/o abdominal pain.    Infectious workup done.  Blood and urine pending.  Pt given 1 x dose of IV Vanc in ED.  Chest Xray negative  Abdominal Xray showed moderate amount of stool.     VSS  Pt on tele running sinus rhythm to ST with hr  90's-low 100's  's/60's  Afebrile. TMAX 98.9   Satting % on room air    Chevron incision with delayed wound healing. Pt has wound vac in place to [facility].  Wound vac dressing changed earlier tonight by charge nurse, Elsy, and this RN.  WV is continuous @ 125.    R SC Permcath heparin locked. Dressing CDI. Dressing changed in HD Friday.    Pt on MWF HD schedule. Had HD Friday. Only 200 mL taken off.  Plan for pt to received 120 mg IV lasix BID.    Pt has 3+ BLE.    Pt wears non skid socked when up OOB.  Walker at the bedside.    Pt remained free from falls or injury thus far.   Bed is in low/ locked position, side rails are up x 2, call light is in reach.   Will continue to monitor.

## 2017-11-18 NOTE — HOSPITAL COURSE
Interval history: No acute events overnight. Patient with low grade temp, 100.2, overnight. Patient reports abdominal pain, radiating into his back specifically R side. Exam notable for edematous tissue of R flank. Also with edema and erythema of BLE. Will obtain US BLE to rule out DVT. Will also obtain CT A/P without contrast and Liver US as still with low grade fever.  Continue Augmentin for possible cellulitis. Patient with increased UOP past 24 hours with lasix and diuril, 2200 cc from 320 cc. Still not clearing but creatinine stable at 3.9 from 3.4. Discussed with Nephrology and they are in agreement to hold off on HD today and continue with diuresis with lasix/albumin. Monitor kidney function closely for signs of recover.

## 2017-11-18 NOTE — PROGRESS NOTES
Orders in place to start timed 24 hour urine collection for creatinine today.     [place] 24 hour urine collection container placed in pt bathroom on ice.   Pt instructed to urinate directly into cannister starting with first void when he gets up this AM and to call and let us know the time of when he first voids so that we can know when to start/end timed collection.   Pt also instructed to keep urine on ice and to not dump any urine out.     Awaiting first void to start timed collection.

## 2017-11-18 NOTE — PROGRESS NOTES
Addendum   -HD 2 hr 10 mins with net  ml remove. Patient reports making more urine up to 500 ml or more/day.   -Lung sound RLL diminished otherwise clear. Though BLE pitting edema, breathing comfortable 97-98% on RA.    -Discussed with primary team-start lasix 120 mg BID. 24 hr CrCl starting tomorrow am 11/18. Strict IO. Daily weights. Okay for low salt diet for now.  Will monitor closely for renal function and IO over weekend.     Kay Guillen NP  Nephrology

## 2017-11-18 NOTE — PROGRESS NOTES
Contacted Dr. Andrea MD on call for abdominal transplant, regarding pt HR slowly climbing up over the course of the night. HR was NSR on tele with HR in the 90's yesterday, but has made its way up to ST with HR up to 119.     Also BP now 109/57 which is a drop from what he was earlier tonight ( 120's/60's)    Temp 99.5    Pt admitted yesterday for fever and had blood and urine cultures sent as well as chest xray and abdominal xray done. Pt received 1 time dose of IV Vancomycin, but is not currently on any abx.    12 lead EKG also done yesterday showing NSR .    No new orders at this time.     MD instructed this RN to notify him if HR continues to climb up.

## 2017-11-18 NOTE — ASSESSMENT & PLAN NOTE
- nephrology consulted  - HD outpatient MWF via permacath  - making more urine. Lasix trial given yesterday with 625 ml output/24hr.

## 2017-11-19 LAB
ALBUMIN SERPL BCP-MCNC: 2.2 G/DL
ALP SERPL-CCNC: 128 U/L
ALT SERPL W/O P-5'-P-CCNC: 7 U/L
ANION GAP SERPL CALC-SCNC: 8 MMOL/L
AST SERPL-CCNC: 11 U/L
BASOPHILS # BLD AUTO: 0.03 K/UL
BASOPHILS NFR BLD: 0.6 %
BILIRUB SERPL-MCNC: 2.4 MG/DL
BUN SERPL-MCNC: 16 MG/DL
CALCIUM SERPL-MCNC: 8.4 MG/DL
CHLORIDE SERPL-SCNC: 101 MMOL/L
CO2 SERPL-SCNC: 25 MMOL/L
CREAT SERPL-MCNC: 3.4 MG/DL
CYCLOSPORINE BLD LC/MS/MS-MCNC: 242 NG/ML
DIFFERENTIAL METHOD: ABNORMAL
EOSINOPHIL # BLD AUTO: 0 K/UL
EOSINOPHIL NFR BLD: 0.4 %
ERYTHROCYTE [DISTWIDTH] IN BLOOD BY AUTOMATED COUNT: 18 %
EST. GFR  (AFRICAN AMERICAN): 26.8 ML/MIN/1.73 M^2
EST. GFR  (NON AFRICAN AMERICAN): 23.2 ML/MIN/1.73 M^2
GLUCOSE SERPL-MCNC: 86 MG/DL
HCT VFR BLD AUTO: 25.6 %
HGB BLD-MCNC: 8.2 G/DL
IMM GRANULOCYTES # BLD AUTO: 0.01 K/UL
IMM GRANULOCYTES NFR BLD AUTO: 0.2 %
LYMPHOCYTES # BLD AUTO: 1.1 K/UL
LYMPHOCYTES NFR BLD: 23.8 %
MAGNESIUM SERPL-MCNC: 2 MG/DL
MCH RBC QN AUTO: 29.9 PG
MCHC RBC AUTO-ENTMCNC: 32 G/DL
MCV RBC AUTO: 93 FL
MONOCYTES # BLD AUTO: 1 K/UL
MONOCYTES NFR BLD: 21 %
NEUTROPHILS # BLD AUTO: 2.5 K/UL
NEUTROPHILS NFR BLD: 54 %
NRBC BLD-RTO: 0 /100 WBC
PHOSPHATE SERPL-MCNC: 2.1 MG/DL
PLATELET # BLD AUTO: 252 K/UL
PMV BLD AUTO: 9.4 FL
POTASSIUM SERPL-SCNC: 4 MMOL/L
PROT SERPL-MCNC: 4.8 G/DL
RBC # BLD AUTO: 2.74 M/UL
SODIUM SERPL-SCNC: 134 MMOL/L
WBC # BLD AUTO: 4.63 K/UL

## 2017-11-19 PROCEDURE — 63600175 PHARM REV CODE 636 W HCPCS

## 2017-11-19 PROCEDURE — 25000003 PHARM REV CODE 250: Performed by: NURSE PRACTITIONER

## 2017-11-19 PROCEDURE — 85025 COMPLETE CBC W/AUTO DIFF WBC: CPT

## 2017-11-19 PROCEDURE — 63600175 PHARM REV CODE 636 W HCPCS: Performed by: NURSE PRACTITIONER

## 2017-11-19 PROCEDURE — 84100 ASSAY OF PHOSPHORUS: CPT

## 2017-11-19 PROCEDURE — 80053 COMPREHEN METABOLIC PANEL: CPT

## 2017-11-19 PROCEDURE — 80158 DRUG ASSAY CYCLOSPORINE: CPT

## 2017-11-19 PROCEDURE — 36415 COLL VENOUS BLD VENIPUNCTURE: CPT

## 2017-11-19 PROCEDURE — 20600001 HC STEP DOWN PRIVATE ROOM

## 2017-11-19 PROCEDURE — 99233 SBSQ HOSP IP/OBS HIGH 50: CPT | Mod: 24,,, | Performed by: NURSE PRACTITIONER

## 2017-11-19 PROCEDURE — 25000003 PHARM REV CODE 250: Performed by: STUDENT IN AN ORGANIZED HEALTH CARE EDUCATION/TRAINING PROGRAM

## 2017-11-19 PROCEDURE — 83735 ASSAY OF MAGNESIUM: CPT

## 2017-11-19 RX ORDER — HYDROMORPHONE HYDROCHLORIDE 1 MG/ML
0.5 INJECTION, SOLUTION INTRAMUSCULAR; INTRAVENOUS; SUBCUTANEOUS EVERY 4 HOURS PRN
Status: DISCONTINUED | OUTPATIENT
Start: 2017-11-19 | End: 2017-11-19

## 2017-11-19 RX ORDER — HYDROMORPHONE HYDROCHLORIDE 1 MG/ML
0.5 INJECTION, SOLUTION INTRAMUSCULAR; INTRAVENOUS; SUBCUTANEOUS EVERY 6 HOURS PRN
Status: DISCONTINUED | OUTPATIENT
Start: 2017-11-19 | End: 2017-11-21

## 2017-11-19 RX ADMIN — CYCLOSPORINE 150 MG: 100 CAPSULE, LIQUID FILLED ORAL at 08:11

## 2017-11-19 RX ADMIN — ASPIRIN 81 MG: 81 TABLET, COATED ORAL at 08:11

## 2017-11-19 RX ADMIN — CHLOROTHIAZIDE SODIUM 500 MG: 500 INJECTION, POWDER, LYOPHILIZED, FOR SOLUTION INTRAVENOUS at 10:11

## 2017-11-19 RX ADMIN — AMOXICILLIN AND CLAVULANATE POTASSIUM 1 TABLET: 875; 125 TABLET, FILM COATED ORAL at 09:11

## 2017-11-19 RX ADMIN — DOCUSATE SODIUM 100 MG: 100 CAPSULE, LIQUID FILLED ORAL at 05:11

## 2017-11-19 RX ADMIN — DOCUSATE SODIUM 100 MG: 100 CAPSULE, LIQUID FILLED ORAL at 09:11

## 2017-11-19 RX ADMIN — ATOVAQUONE 1500 MG: 750 SUSPENSION ORAL at 08:11

## 2017-11-19 RX ADMIN — LEVETIRACETAM 500 MG: 500 TABLET ORAL at 08:11

## 2017-11-19 RX ADMIN — HYDROMORPHONE HYDROCHLORIDE 0.5 MG: 1 INJECTION, SOLUTION INTRAMUSCULAR; INTRAVENOUS; SUBCUTANEOUS at 01:11

## 2017-11-19 RX ADMIN — OXYCODONE AND ACETAMINOPHEN 1 TABLET: 10; 325 TABLET ORAL at 04:11

## 2017-11-19 RX ADMIN — PREDNISONE 5 MG: 5 TABLET ORAL at 08:11

## 2017-11-19 RX ADMIN — FUROSEMIDE 120 MG: 10 INJECTION, SOLUTION INTRAVENOUS at 09:11

## 2017-11-19 RX ADMIN — AMOXICILLIN AND CLAVULANATE POTASSIUM 1 TABLET: 875; 125 TABLET, FILM COATED ORAL at 08:11

## 2017-11-19 RX ADMIN — OXYCODONE AND ACETAMINOPHEN 1 TABLET: 10; 325 TABLET ORAL at 05:11

## 2017-11-19 RX ADMIN — FLUCONAZOLE 200 MG: 200 TABLET ORAL at 08:11

## 2017-11-19 RX ADMIN — THIAMINE HCL (VITAMIN B1) 50 MG TABLET 100 MG: at 08:11

## 2017-11-19 RX ADMIN — PANTOPRAZOLE SODIUM 40 MG: 40 TABLET, DELAYED RELEASE ORAL at 08:11

## 2017-11-19 RX ADMIN — CYCLOSPORINE 150 MG: 100 CAPSULE, LIQUID FILLED ORAL at 05:11

## 2017-11-19 RX ADMIN — OXYCODONE AND ACETAMINOPHEN 1 TABLET: 10; 325 TABLET ORAL at 12:11

## 2017-11-19 RX ADMIN — LEVETIRACETAM 500 MG: 500 TABLET ORAL at 09:11

## 2017-11-19 RX ADMIN — FUROSEMIDE 120 MG: 10 INJECTION, SOLUTION INTRAVENOUS at 05:11

## 2017-11-19 RX ADMIN — THERA TABS 1 TABLET: TAB at 08:11

## 2017-11-19 RX ADMIN — HYDROMORPHONE HYDROCHLORIDE 0.5 MG: 1 INJECTION, SOLUTION INTRAMUSCULAR; INTRAVENOUS; SUBCUTANEOUS at 08:11

## 2017-11-19 RX ADMIN — OXYCODONE AND ACETAMINOPHEN 1 TABLET: 10; 325 TABLET ORAL at 09:11

## 2017-11-19 RX ADMIN — HYDROMORPHONE HYDROCHLORIDE 0.5 MG: 1 INJECTION, SOLUTION INTRAMUSCULAR; INTRAVENOUS; SUBCUTANEOUS at 06:11

## 2017-11-19 RX ADMIN — DOCUSATE SODIUM 100 MG: 100 CAPSULE, LIQUID FILLED ORAL at 01:11

## 2017-11-19 NOTE — ASSESSMENT & PLAN NOTE
- nephrology consulted  - HD outpatient MWF via permacath  - making more urine. Continue lasix 120 mg bid. Monitor strict I&Os.  - Diuril 500 mg IV x 1 dose today.

## 2017-11-19 NOTE — PROGRESS NOTES
[facility]  Liver Transplant  Progress Note    Patient Name: Jhonny Diana  MRN: [id number]  Admission Date: 2017  Hospital Length of Stay: 2 days  Code Status: Full Code  Primary Care Provider: Primary Doctor No  Post-Operative Day: 31    ORGAN:   LIVER  Disease Etiology: Acute Alcoholic Hepatitis  Donor Type:    - Brain Death  CDC High Risk:   No  Donor CMV Status:   Donor CMV Status: Positive  Donor HBcAB:   Negative  Donor HCV Status:   Negative  Whole or Partial: Whole Liver  Biliary Anastomosis: End to End  Arterial Anatomy: Standard  Subjective:     History of Present Illness:  26 y/o male with pmh of ESLD secondary ETOH.  Now s/p oltx 10/19/2017 (Liver), which took place without complication.  Steroid induction, CMV +/+.  Post op complicated by LAURA, seizures, and delayed wound healing.  Wound vac placed to chevron incision 10/29.  Presents to ED with 1 day history of low grade fever, tmax 100.9.  States have more abdominal pain, particularly rlq radiating to right flank.  Remains on HD outpatient MWF schedule via right subclavian permacath.  Reports making more urine and last HD .      Hospital Course:  Interval history: pt more edematous and c/o scrotal and penile pain due to fluid. Spoke with nephrology. Will give diuril 500 mg today. Pt may need HD later today or tomorrow. Continue lasix 120 mg bid as well. Good response noted with blood transfusion yesterday. Continue Augmentin for possible cellulitis. Monitor.     Scheduled Meds:   amoxicillin-clavulanate 875-125mg  1 tablet Oral Q12H    aspirin  81 mg Oral Daily    atovaquone  1,500 mg Oral Daily    chlorothiazide (DIURIL) IVPB  500 mg Intravenous Once    cycloSPORINE modified (NEORAL)  150 mg Oral BID    docusate sodium  100 mg Oral TID    [START ON 2017] ergocalciferol  50,000 Units Oral Q7 Days    fluconazole  200 mg Oral Daily    furosemide  120 mg Intravenous BID    levETIRAcetam  500 mg Oral BID     multivitamin  1 tablet Oral Daily    pantoprazole  40 mg Oral Daily    predniSONE  5 mg Oral Daily    thiamine  100 mg Oral Daily    valganciclovir 50 mg/ml  100 mg Oral Every Mon, Wed, Fri     Continuous Infusions:   PRN Meds:sodium chloride, sodium chloride 0.9%, acetaminophen, heparin (porcine), HYDROmorphone, ondansetron, oxyCODONE-acetaminophen, ramelteon, sodium chloride 0.9%    Review of Systems   Constitutional: Positive for appetite change (improving). Negative for activity change, chills, diaphoresis, fatigue and fever.   HENT: Negative for congestion, mouth sores and trouble swallowing.    Eyes: Negative for pain, discharge and visual disturbance.   Respiratory: Negative for cough, chest tightness, shortness of breath and wheezing.    Cardiovascular: Negative for chest pain, palpitations and leg swelling.   Gastrointestinal: Positive for abdominal distention and abdominal pain. Negative for constipation, diarrhea, nausea and vomiting.   Endocrine: Negative.    Genitourinary: Positive for flank pain. Negative for decreased urine volume, difficulty urinating, dysuria, hematuria and urgency.   Musculoskeletal: Negative for arthralgias, back pain and joint swelling.   Skin: Positive for wound. Negative for color change, pallor and rash.   Allergic/Immunologic: Positive for immunocompromised state.   Neurological: Positive for weakness. Negative for dizziness, tremors, seizures, syncope and headaches.   Hematological: Bruises/bleeds easily.   Psychiatric/Behavioral: Positive for sleep disturbance. Negative for agitation, confusion, decreased concentration, dysphoric mood, hallucinations and suicidal ideas. The patient is not nervous/anxious.    All other systems reviewed and are negative.    Objective:     Vital Signs (Most Recent):  Temp: 98.8 °F (37.1 °C) (11/19/17 0750)  Pulse: 83 (11/19/17 1056)  Resp: 15 (11/19/17 0750)  BP: 112/69 (11/19/17 0750)  SpO2: 99 % (11/19/17 0750) Vital Signs (24h  Range):  Temp:  [98 °F (36.7 °C)-99 °F (37.2 °C)] 98.8 °F (37.1 °C)  Pulse:  [] 83  Resp:  [15-20] 15  SpO2:  [97 %-100 %] 99 %  BP: (112-129)/(67-80) 112/69     Weight: 84 kg (185 lb 3 oz)  Body mass index is 29 kg/m².    Intake/Output - Last 3 Shifts       11/17 0700 - 11/18 0659 11/18 0700 - 11/19 0659 11/19 0700 - 11/20 0659    P.O. 270 1800     Other 600      Total Intake(mL/kg) 870 (10.4) 1800 (21.4)     Urine (mL/kg/hr) 625 (0.3) 320 (0.2)     Other 804 (0.4)      Stool 0 (0) 0 (0)     Total Output 1429 320      Net -559 +1480             Stool Occurrence 0 x 0 x           Physical Exam   Constitutional: He is oriented to person, place, and time. He appears well-developed and well-nourished. No distress.   Temporal and distal extremity muscle wasting   HENT:   Head: Normocephalic and atraumatic.   Mouth/Throat: No oropharyngeal exudate.   Eyes: EOM are normal. Pupils are equal, round, and reactive to light. Scleral icterus is present.   Neck: Normal range of motion. Neck supple.   Cardiovascular: Normal rate, regular rhythm, normal heart sounds and intact distal pulses.  Exam reveals no gallop and no friction rub.    No murmur heard.  Pulmonary/Chest: Effort normal and breath sounds normal. No respiratory distress. He has no wheezes. He has no rales. He exhibits no tenderness.   Dec R base   Abdominal: Soft. Bowel sounds are normal. He exhibits no distension. There is tenderness. There is no rebound and no guarding.       Chevron incision opened, drainage yellow/brown    Musculoskeletal: Normal range of motion. He exhibits edema (Gen +2-3).   Neurological: He is alert and oriented to person, place, and time.   Skin: Skin is warm and dry. Capillary refill takes 2 to 3 seconds. He is not diaphoretic. There is erythema (ble, rlq abdomen).   Psychiatric: He has a normal mood and affect. His behavior is normal. Judgment and thought content normal.   Nursing note and vitals  reviewed.      Laboratory:  Immunosuppressants         Stop Route Frequency     cycloSPORINE modified capsule 150 mg      -- Oral 2 times daily        CBC:   Recent Labs  Lab 11/19/17  0504   WBC 4.63   RBC 2.74*   HGB 8.2*   HCT 25.6*      MCV 93   MCH 29.9   MCHC 32.0     CMP:   Recent Labs  Lab 11/19/17  0504   GLU 86   CALCIUM 8.4*   ALBUMIN 2.2*   PROT 4.8*   *   K 4.0   CO2 25      BUN 16   CREATININE 3.4*   ALKPHOS 128   ALT 7*   AST 11   BILITOT 2.4*     Labs within the past 24 hours have been reviewed.    Diagnostic Results:  I have personally reviewed all pertinent imaging studies.    Assessment/Plan:     * Fever    - infectious work up initiated, blood cultures ngtd. Urine cx no growth.  - Vanc x 1 dose given 11/17.  - afebrile since admit.   - Started Augmentin q12h 11/18.           LAURA (acute kidney injury)    - nephrology consulted  - HD outpatient MWF via permacath  - making more urine. Continue lasix 120 mg bid. Monitor strict I&Os.  - Diuril 500 mg IV x 1 dose today.          Delayed surgical wound healing    - et nurse consulted, dressing changed 11/17  - sutures removed from abdomin and right groin 11/18  - wound erythematous, give dose of vancomycin 11/17. Started Augmentin 11/18.          Liver transplanted    - LFts stable, monitor labs daily          Long-term use of immunosuppressant medication    - continue cyclosporine and prednisone  - monitor cyclosporine levels daily and adjust for therapeutic dose  - hold cellcept due to likely infection          Prophylactic immunotherapy    - see long term immunosuppression.          At risk for opportunistic infections    - continue valcyte for CMV prophylaxis and atovaquone for pcp.          Seizure    - continue keppra.          Anemia of chronic disease    - H&H stable.  - Transfused 1 unit PRBCs yesterday 11/18.           Hypomagnesemia    - improved. Monitor.            VTE Risk Mitigation         Ordered     heparin (porcine)  injection 1,000 Units  As needed (PRN)     Route:  Intra-Catheter        11/17/17 1628     Medium Risk of VTE  Once      11/17/17 0855     Place sequential compression device  Until discontinued      11/17/17 0855          The patients clinical status was discussed at multidisplinary rounds, involving transplant surgery, transplant medicine, pharmacy, nursing, nutrition, and social work     Discharge Planning: not a candidate for dc at this time.  Monitor kidney function --> may need outpt HD  Monitor HH needs vs rehab for deconditioned status      Mandy Crespo NP  Liver Transplant  [facility]

## 2017-11-19 NOTE — ASSESSMENT & PLAN NOTE
- et nurse consulted, dressing changed 11/17  - sutures removed from abdomin and right groin 11/18  - wound erythematous, give dose of vancomycin 11/17. Started Augmentin 11/18.

## 2017-11-19 NOTE — SUBJECTIVE & OBJECTIVE
Scheduled Meds:   amoxicillin-clavulanate 875-125mg  1 tablet Oral Q12H    aspirin  81 mg Oral Daily    atovaquone  1,500 mg Oral Daily    chlorothiazide (DIURIL) IVPB  500 mg Intravenous Once    cycloSPORINE modified (NEORAL)  150 mg Oral BID    docusate sodium  100 mg Oral TID    [START ON 11/21/2017] ergocalciferol  50,000 Units Oral Q7 Days    fluconazole  200 mg Oral Daily    furosemide  120 mg Intravenous BID    levETIRAcetam  500 mg Oral BID    multivitamin  1 tablet Oral Daily    pantoprazole  40 mg Oral Daily    predniSONE  5 mg Oral Daily    thiamine  100 mg Oral Daily    valganciclovir 50 mg/ml  100 mg Oral Every Mon, Wed, Fri     Continuous Infusions:   PRN Meds:sodium chloride, sodium chloride 0.9%, acetaminophen, heparin (porcine), HYDROmorphone, ondansetron, oxyCODONE-acetaminophen, ramelteon, sodium chloride 0.9%    Review of Systems   Constitutional: Positive for appetite change (improving). Negative for activity change, chills, diaphoresis, fatigue and fever.   HENT: Negative for congestion, mouth sores and trouble swallowing.    Eyes: Negative for pain, discharge and visual disturbance.   Respiratory: Negative for cough, chest tightness, shortness of breath and wheezing.    Cardiovascular: Negative for chest pain, palpitations and leg swelling.   Gastrointestinal: Positive for abdominal distention and abdominal pain. Negative for constipation, diarrhea, nausea and vomiting.   Endocrine: Negative.    Genitourinary: Positive for flank pain. Negative for decreased urine volume, difficulty urinating, dysuria, hematuria and urgency.   Musculoskeletal: Negative for arthralgias, back pain and joint swelling.   Skin: Positive for wound. Negative for color change, pallor and rash.   Allergic/Immunologic: Positive for immunocompromised state.   Neurological: Positive for weakness. Negative for dizziness, tremors, seizures, syncope and headaches.   Hematological: Bruises/bleeds easily.    Psychiatric/Behavioral: Positive for sleep disturbance. Negative for agitation, confusion, decreased concentration, dysphoric mood, hallucinations and suicidal ideas. The patient is not nervous/anxious.    All other systems reviewed and are negative.    Objective:     Vital Signs (Most Recent):  Temp: 98.8 °F (37.1 °C) (11/19/17 0750)  Pulse: 83 (11/19/17 1056)  Resp: 15 (11/19/17 0750)  BP: 112/69 (11/19/17 0750)  SpO2: 99 % (11/19/17 0750) Vital Signs (24h Range):  Temp:  [98 °F (36.7 °C)-99 °F (37.2 °C)] 98.8 °F (37.1 °C)  Pulse:  [] 83  Resp:  [15-20] 15  SpO2:  [97 %-100 %] 99 %  BP: (112-129)/(67-80) 112/69     Weight: 84 kg (185 lb 3 oz)  Body mass index is 29 kg/m².    Intake/Output - Last 3 Shifts       11/17 0700 - 11/18 0659 11/18 0700 - 11/19 0659 11/19 0700 - 11/20 0659    P.O. 270 1800     Other 600      Total Intake(mL/kg) 870 (10.4) 1800 (21.4)     Urine (mL/kg/hr) 625 (0.3) 320 (0.2)     Other 804 (0.4)      Stool 0 (0) 0 (0)     Total Output 1429 320      Net -559 +1480             Stool Occurrence 0 x 0 x           Physical Exam   Constitutional: He is oriented to person, place, and time. He appears well-developed and well-nourished. No distress.   Temporal and distal extremity muscle wasting   HENT:   Head: Normocephalic and atraumatic.   Mouth/Throat: No oropharyngeal exudate.   Eyes: EOM are normal. Pupils are equal, round, and reactive to light. Scleral icterus is present.   Neck: Normal range of motion. Neck supple.   Cardiovascular: Normal rate, regular rhythm, normal heart sounds and intact distal pulses.  Exam reveals no gallop and no friction rub.    No murmur heard.  Pulmonary/Chest: Effort normal and breath sounds normal. No respiratory distress. He has no wheezes. He has no rales. He exhibits no tenderness.   Dec R base   Abdominal: Soft. Bowel sounds are normal. He exhibits no distension. There is tenderness. There is no rebound and no guarding.       Chevron incision opened,  drainage yellow/brown    Musculoskeletal: Normal range of motion. He exhibits edema (Gen +2-3).   Neurological: He is alert and oriented to person, place, and time.   Skin: Skin is warm and dry. Capillary refill takes 2 to 3 seconds. He is not diaphoretic. There is erythema (ble, rlq abdomen).   Psychiatric: He has a normal mood and affect. His behavior is normal. Judgment and thought content normal.   Nursing note and vitals reviewed.      Laboratory:  Immunosuppressants         Stop Route Frequency     cycloSPORINE modified capsule 150 mg      -- Oral 2 times daily        CBC:   Recent Labs  Lab 11/19/17  0504   WBC 4.63   RBC 2.74*   HGB 8.2*   HCT 25.6*      MCV 93   MCH 29.9   MCHC 32.0     CMP:   Recent Labs  Lab 11/19/17  0504   GLU 86   CALCIUM 8.4*   ALBUMIN 2.2*   PROT 4.8*   *   K 4.0   CO2 25      BUN 16   CREATININE 3.4*   ALKPHOS 128   ALT 7*   AST 11   BILITOT 2.4*     Labs within the past 24 hours have been reviewed.    Diagnostic Results:  I have personally reviewed all pertinent imaging studies.

## 2017-11-19 NOTE — PLAN OF CARE
Problem: Patient Care Overview  Goal: Plan of Care Review  Outcome: Ongoing (interventions implemented as appropriate)    Pt AAOx4 with mother at the bedside.  Pt mother is attentive to and supportive of pt and actively involved in pt care.      Blood & urine cultures show NGTD.  Pt given 1 x dose of IV Vanc in ED.  Started on PO amoxicillin yesterday.    VSS  Pt on tele running sinus rhythm with hr 80's- 90's  BP 1 teens to 120's/60's  Afebrile. TMAX 99.0  Satting 100% on room air    Chevron incision with delayed wound healing.   Pt has wound vac in place to chevron..  WV set to continuous @ 125- putting out serous drainage.   Cannister at approximately 300 mL at this time. Back up cannister in room for when current cannister gets full.    R SC Permcath heparin locked. Dressing CDI. Dressing changed in HD Friday.    Pt on [facility] HD schedule. Had HD Friday. Only 200 mL taken off.  Pt recieving 120 mg IV lasix BID.   Per pt, plan is to try to not do HD this week and see how kidneys do on their own.     24 hour urine collection in progress for urine creatinine.   Start 11/18 @ 1400.   End 11/19 @ 1400.  Pt has only voided approximately 210 mL thus far.    Pt has 3+ BLE.    Pt also with new onset R-sided penile edema overnight.   Per MD morales, Penis elevated on rolled up towel.   Ice applied to affected area per pt request.  Pt also given 0.5 mg IV dilaudid for pain unrelieved by oral pain medication.    Pt wears non skid socked when up OOB.  Walker at the bedside.    Pt remained free from falls or injury thus far.   Bed is in low/ locked position, side rails are up x 2, call light is in reach.   Will continue to monitor.

## 2017-11-19 NOTE — PROGRESS NOTES
Pt called nurses station requesting his nurse to take a look at his wound.   Upon entering pt room, pt crying. When asked what was wrong, pt was very hesitant to answer, but eventually did say that he is concerned because the whole right side of the shaft of his penis is swollen and painful.   MD on call for abdominal transplant, Dr. Dyson, notified.    Per MD, is most likely depended drainage and can be relieved by walking or keeping elevated on a towel.     Also asked if there is anything that we can give pt for BTP since he cannot have percocet again until after 3 AM and is c/o 9/10 pain. MD placed order for dilaudid 0.5 mg IV Q4 for BTP unrelieved by oral pain medicine.

## 2017-11-19 NOTE — ASSESSMENT & PLAN NOTE
- infectious work up initiated, blood cultures ngtd. Urine cx no growth.  - Vanc x 1 dose given 11/17.  - afebrile since admit.   - Started Augmentin q12h 11/18.

## 2017-11-19 NOTE — PLAN OF CARE
Patient's VSS for shift. Patient remains AAOx4, calm, cooperative, and independent. Mandy Crespo NP removed two sutures from old drain sites near wound vac site. Patient premedicated with 1 mg of dilaudid. Tolerated the procedure well. Patient has been ambulating around the room with IV pole. Did not receive any breakfast due to issues with dietary. Did eat 100% of lunch. Patient is putting all urine into orange jug and recording output. Given 120 mg of Lasix twice today. Received one unit of blood today and 1 mag rider. Patient complains of itching to BLEs which are reddened. Patient states pain well controlled with PRN Percocet. Mother at bedside, attentive to patient, interacting with patient, and supportive of patient. Free from falls, injury, and further skin breakdown.

## 2017-11-19 NOTE — PLAN OF CARE
Problem: Patient Care Overview  Goal: Plan of Care Review  Outcome: Ongoing (interventions implemented as appropriate)  Patient's VSS for shift. Patient remains AAOx4, calm, cooperative, and independent. Wound vac still putting out serous drainage from abdomen. Patient still complaining of significant right sided abdominal pain radiating to the back. Patient's 24 hour urine restarted at 9:00 due to patient's admission that he had thrown away some urine from yesterday and it had not been recorded. Patient receiving Lasix and a 500 mg dose of Diuril today. Urine output over 1 liter. Patient sat up in chair for half of shift. States that he has new penile edema which is causing him pain. Encouraged to sit or lie down in such a way as to take pressure off that area. Otherwise, patient free from falls, injury, and skin breakdown.

## 2017-11-20 LAB
ALBUMIN SERPL BCP-MCNC: 2.1 G/DL
ALP SERPL-CCNC: 134 U/L
ALT SERPL W/O P-5'-P-CCNC: 6 U/L
ANION GAP SERPL CALC-SCNC: 9 MMOL/L
AST SERPL-CCNC: 12 U/L
BASOPHILS # BLD AUTO: 0.05 K/UL
BASOPHILS NFR BLD: 0.7 %
BILIRUB SERPL-MCNC: 2.3 MG/DL
BUN SERPL-MCNC: 21 MG/DL
CALCIUM SERPL-MCNC: 8.4 MG/DL
CHLORIDE SERPL-SCNC: 99 MMOL/L
CO2 SERPL-SCNC: 24 MMOL/L
CREAT 24H UR-MRATE: 24 MG/HR
CREAT SERPL-MCNC: 3.9 MG/DL
CREAT UR-MCNC: 23 MG/DL
CREATININE, URINE (MG/SPEC): 575 MG/SPEC
CYCLOSPORINE BLD LC/MS/MS-MCNC: 261 NG/ML
DIFFERENTIAL METHOD: ABNORMAL
EOSINOPHIL # BLD AUTO: 0 K/UL
EOSINOPHIL NFR BLD: 0 %
ERYTHROCYTE [DISTWIDTH] IN BLOOD BY AUTOMATED COUNT: 17.7 %
EST. GFR  (AFRICAN AMERICAN): 22.7 ML/MIN/1.73 M^2
EST. GFR  (NON AFRICAN AMERICAN): 19.7 ML/MIN/1.73 M^2
FUNGUS SPEC CULT: NORMAL
GLUCOSE SERPL-MCNC: 86 MG/DL
HCT VFR BLD AUTO: 26.3 %
HGB BLD-MCNC: 8.6 G/DL
IMM GRANULOCYTES # BLD AUTO: 0.04 K/UL
IMM GRANULOCYTES NFR BLD AUTO: 0.5 %
LYMPHOCYTES # BLD AUTO: 1.2 K/UL
LYMPHOCYTES NFR BLD: 16.2 %
MAGNESIUM SERPL-MCNC: 2 MG/DL
MCH RBC QN AUTO: 29.6 PG
MCHC RBC AUTO-ENTMCNC: 32.7 G/DL
MCV RBC AUTO: 90 FL
MONOCYTES # BLD AUTO: 1.6 K/UL
MONOCYTES NFR BLD: 21.2 %
NEUTROPHILS # BLD AUTO: 4.6 K/UL
NEUTROPHILS NFR BLD: 61.4 %
NRBC BLD-RTO: 0 /100 WBC
PHOSPHATE SERPL-MCNC: 2.3 MG/DL
PLATELET # BLD AUTO: 274 K/UL
PMV BLD AUTO: 9.2 FL
POTASSIUM SERPL-SCNC: 3.5 MMOL/L
PROT SERPL-MCNC: 4.6 G/DL
RBC # BLD AUTO: 2.91 M/UL
SODIUM SERPL-SCNC: 132 MMOL/L
URINE COLLECTION DURATION: 24 HR
URINE VOLUME: 2500 ML
WBC # BLD AUTO: 7.53 K/UL

## 2017-11-20 PROCEDURE — 63600175 PHARM REV CODE 636 W HCPCS: Performed by: NURSE PRACTITIONER

## 2017-11-20 PROCEDURE — 99233 SBSQ HOSP IP/OBS HIGH 50: CPT | Mod: 24,,, | Performed by: PHYSICIAN ASSISTANT

## 2017-11-20 PROCEDURE — 83735 ASSAY OF MAGNESIUM: CPT

## 2017-11-20 PROCEDURE — 25500020 PHARM REV CODE 255: Performed by: STUDENT IN AN ORGANIZED HEALTH CARE EDUCATION/TRAINING PROGRAM

## 2017-11-20 PROCEDURE — 84100 ASSAY OF PHOSPHORUS: CPT

## 2017-11-20 PROCEDURE — 63600175 PHARM REV CODE 636 W HCPCS: Performed by: PHYSICIAN ASSISTANT

## 2017-11-20 PROCEDURE — 99233 SBSQ HOSP IP/OBS HIGH 50: CPT | Mod: ,,, | Performed by: NURSE PRACTITIONER

## 2017-11-20 PROCEDURE — 36415 COLL VENOUS BLD VENIPUNCTURE: CPT

## 2017-11-20 PROCEDURE — 80158 DRUG ASSAY CYCLOSPORINE: CPT

## 2017-11-20 PROCEDURE — 80053 COMPREHEN METABOLIC PANEL: CPT

## 2017-11-20 PROCEDURE — 25000003 PHARM REV CODE 250: Performed by: PHYSICIAN ASSISTANT

## 2017-11-20 PROCEDURE — 25000003 PHARM REV CODE 250: Performed by: NURSE PRACTITIONER

## 2017-11-20 PROCEDURE — 97608 NEG PRS WND THER NDME>50SQCM: CPT

## 2017-11-20 PROCEDURE — P9047 ALBUMIN (HUMAN), 25%, 50ML: HCPCS | Performed by: PHYSICIAN ASSISTANT

## 2017-11-20 PROCEDURE — 82570 ASSAY OF URINE CREATININE: CPT

## 2017-11-20 PROCEDURE — 20600001 HC STEP DOWN PRIVATE ROOM

## 2017-11-20 PROCEDURE — 25000003 PHARM REV CODE 250: Performed by: STUDENT IN AN ORGANIZED HEALTH CARE EDUCATION/TRAINING PROGRAM

## 2017-11-20 PROCEDURE — 85025 COMPLETE CBC W/AUTO DIFF WBC: CPT

## 2017-11-20 RX ORDER — ALBUMIN HUMAN 250 G/1000ML
25 SOLUTION INTRAVENOUS EVERY 8 HOURS
Status: DISCONTINUED | OUTPATIENT
Start: 2017-11-20 | End: 2017-11-20

## 2017-11-20 RX ORDER — BISACODYL 10 MG
10 SUPPOSITORY, RECTAL RECTAL ONCE
Status: COMPLETED | OUTPATIENT
Start: 2017-11-20 | End: 2017-11-20

## 2017-11-20 RX ORDER — ALBUMIN HUMAN 250 G/1000ML
25 SOLUTION INTRAVENOUS ONCE
Status: COMPLETED | OUTPATIENT
Start: 2017-11-20 | End: 2017-11-20

## 2017-11-20 RX ORDER — HEPARIN SODIUM 5000 [USP'U]/ML
5000 INJECTION, SOLUTION INTRAVENOUS; SUBCUTANEOUS EVERY 8 HOURS
Status: DISCONTINUED | OUTPATIENT
Start: 2017-11-20 | End: 2017-11-21 | Stop reason: HOSPADM

## 2017-11-20 RX ORDER — VALGANCICLOVIR 450 MG/1
450 TABLET, FILM COATED ORAL
Status: DISCONTINUED | OUTPATIENT
Start: 2017-11-20 | End: 2017-11-21 | Stop reason: HOSPADM

## 2017-11-20 RX ADMIN — OXYCODONE AND ACETAMINOPHEN 1 TABLET: 10; 325 TABLET ORAL at 11:11

## 2017-11-20 RX ADMIN — ASPIRIN 81 MG: 81 TABLET, COATED ORAL at 10:11

## 2017-11-20 RX ADMIN — ATOVAQUONE 1500 MG: 750 SUSPENSION ORAL at 05:11

## 2017-11-20 RX ADMIN — VALGANCICLOVIR 450 MG: 450 TABLET, FILM COATED ORAL at 10:11

## 2017-11-20 RX ADMIN — CYCLOSPORINE 150 MG: 100 CAPSULE, LIQUID FILLED ORAL at 06:11

## 2017-11-20 RX ADMIN — HYDROMORPHONE HYDROCHLORIDE 0.5 MG: 1 INJECTION, SOLUTION INTRAMUSCULAR; INTRAVENOUS; SUBCUTANEOUS at 01:11

## 2017-11-20 RX ADMIN — DOCUSATE SODIUM 100 MG: 100 CAPSULE, LIQUID FILLED ORAL at 05:11

## 2017-11-20 RX ADMIN — PANTOPRAZOLE SODIUM 40 MG: 40 TABLET, DELAYED RELEASE ORAL at 05:11

## 2017-11-20 RX ADMIN — IOHEXOL 15 ML: 350 INJECTION, SOLUTION INTRAVENOUS at 07:11

## 2017-11-20 RX ADMIN — IOHEXOL 15 ML: 350 INJECTION, SOLUTION INTRAVENOUS at 05:11

## 2017-11-20 RX ADMIN — OXYCODONE AND ACETAMINOPHEN 1 TABLET: 10; 325 TABLET ORAL at 10:11

## 2017-11-20 RX ADMIN — HEPARIN SODIUM 5000 UNITS: 5000 INJECTION, SOLUTION INTRAVENOUS; SUBCUTANEOUS at 09:11

## 2017-11-20 RX ADMIN — OXYCODONE AND ACETAMINOPHEN 1 TABLET: 10; 325 TABLET ORAL at 06:11

## 2017-11-20 RX ADMIN — AMOXICILLIN AND CLAVULANATE POTASSIUM 1 TABLET: 875; 125 TABLET, FILM COATED ORAL at 10:11

## 2017-11-20 RX ADMIN — FUROSEMIDE 120 MG: 10 INJECTION, SOLUTION INTRAVENOUS at 05:11

## 2017-11-20 RX ADMIN — OXYCODONE AND ACETAMINOPHEN 1 TABLET: 10; 325 TABLET ORAL at 07:11

## 2017-11-20 RX ADMIN — CYCLOSPORINE 150 MG: 100 CAPSULE, LIQUID FILLED ORAL at 05:11

## 2017-11-20 RX ADMIN — PREDNISONE 5 MG: 5 TABLET ORAL at 05:11

## 2017-11-20 RX ADMIN — FLUCONAZOLE 200 MG: 200 TABLET ORAL at 10:11

## 2017-11-20 RX ADMIN — LEVETIRACETAM 500 MG: 500 TABLET ORAL at 10:11

## 2017-11-20 RX ADMIN — OXYCODONE AND ACETAMINOPHEN 1 TABLET: 10; 325 TABLET ORAL at 01:11

## 2017-11-20 RX ADMIN — BISACODYL 10 MG: 10 SUPPOSITORY RECTAL at 10:11

## 2017-11-20 RX ADMIN — THIAMINE HCL (VITAMIN B1) 50 MG TABLET 100 MG: at 05:11

## 2017-11-20 RX ADMIN — FUROSEMIDE 120 MG: 10 INJECTION, SOLUTION INTRAVENOUS at 06:11

## 2017-11-20 RX ADMIN — DOCUSATE SODIUM 100 MG: 100 CAPSULE, LIQUID FILLED ORAL at 01:11

## 2017-11-20 RX ADMIN — DOCUSATE SODIUM 100 MG: 100 CAPSULE, LIQUID FILLED ORAL at 09:11

## 2017-11-20 RX ADMIN — OXYCODONE AND ACETAMINOPHEN 1 TABLET: 10; 325 TABLET ORAL at 03:11

## 2017-11-20 RX ADMIN — HYDROMORPHONE HYDROCHLORIDE 0.5 MG: 1 INJECTION, SOLUTION INTRAMUSCULAR; INTRAVENOUS; SUBCUTANEOUS at 09:11

## 2017-11-20 RX ADMIN — THERA TABS 1 TABLET: TAB at 05:11

## 2017-11-20 RX ADMIN — ALBUMIN (HUMAN) 25 G: 12.5 SOLUTION INTRAVENOUS at 11:11

## 2017-11-20 RX ADMIN — AMOXICILLIN AND CLAVULANATE POTASSIUM 1 TABLET: 875; 125 TABLET, FILM COATED ORAL at 01:11

## 2017-11-20 RX ADMIN — LEVETIRACETAM 500 MG: 500 TABLET ORAL at 09:11

## 2017-11-20 NOTE — PLAN OF CARE
Problem: Patient Care Overview  Goal: Plan of Care Review  Outcome: Ongoing (interventions implemented as appropriate)  - HD on hold due to stable creat & increased UOP  - Continue strict I&Os  - Wound vac changed today by  nursing & they will follow during admit  - BLE Edema 3+ & red - U/S done  - Diet changed to regular, trend labs  - Suppository for patient to self administer (last BM 11/16)  - Afebrile today, trend VS  - Pt refuses assessment of buttock/sacral area for skin monitoring  - Encourage ambulation & mobility, nutrition
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
## 2017-11-20 NOTE — PROGRESS NOTES
[facility]  Liver Transplant  Progress Note    Patient Name: Jhonny Diana  MRN: [id number]  Admission Date: 2017  Hospital Length of Stay: 3 days  Code Status: Full Code  Primary Care Provider: Primary Doctor No  Post-Operative Day: 32    ORGAN:   LIVER  Disease Etiology: Acute Alcoholic Hepatitis  Donor Type:    - Brain Death  CDC High Risk:   No  Donor CMV Status:   Donor CMV Status: Positive  Donor HBcAB:   Negative  Donor HCV Status:   Negative  Whole or Partial: Whole Liver  Biliary Anastomosis: End to End  Arterial Anatomy: Standard  Subjective:     History of Present Illness:  26 y/o male with pmh of ESLD secondary ETOH.  Now s/p oltx 10/19/2017 (Liver), which took place without complication.  Steroid induction, CMV +/+.  Post op complicated by LAURA, seizures, and delayed wound healing.  Wound vac placed to chevron incision 10/29.  Presents to ED with 1 day history of low grade fever, tmax 100.9.  States have more abdominal pain, particularly rlq radiating to right flank.  Remains on HD outpatient MWF schedule via right subclavian permacath.  Reports making more urine and last HD .      Hospital Course:  Interval history: No acute events overnight. Patient with low grade temp, 100.2, overnight. Patient reports abdominal pain, radiating into his back specifically R side. Exam notable for edematous tissue of R flank. Also with edema and erythema of BLE. Will obtain US BLE to rule out DVT. Will also obtain CT A/P without contrast and Liver US as still with low grade fever.  Continue Augmentin for possible cellulitis. Patient with increased UOP past 24 hours with lasix and diuril, 2200 cc from 320 cc. Still not clearing but creatinine stable at 3.9 from 3.4. Discussed with Nephrology and they are in agreement to hold off on HD today and continue with diuresis with lasix/albumin. Monitor kidney function closely for signs of recover.     Scheduled Meds:   amoxicillin-clavulanate  875-125mg  1 tablet Oral Q12H    aspirin  81 mg Oral Daily    atovaquone  1,500 mg Oral Daily    cycloSPORINE modified (NEORAL)  150 mg Oral BID    docusate sodium  100 mg Oral TID    [START ON 11/21/2017] ergocalciferol  50,000 Units Oral Q7 Days    fluconazole  200 mg Oral Daily    furosemide  120 mg Intravenous BID    heparin (porcine)  5,000 Units Subcutaneous Q8H    levETIRAcetam  500 mg Oral BID    multivitamin  1 tablet Oral Daily    pantoprazole  40 mg Oral Daily    predniSONE  5 mg Oral Daily    thiamine  100 mg Oral Daily    valGANciclovir  450 mg Oral Every Mon, Wed, Fri     Continuous Infusions:   PRN Meds:sodium chloride, sodium chloride 0.9%, acetaminophen, heparin (porcine), HYDROmorphone, ondansetron, oxyCODONE-acetaminophen, ramelteon, sodium chloride 0.9%    Review of Systems   Constitutional: Positive for appetite change (improving). Negative for activity change, chills, diaphoresis, fatigue and fever.   HENT: Negative for congestion, mouth sores and trouble swallowing.    Eyes: Negative for pain, discharge and visual disturbance.   Respiratory: Negative for cough, chest tightness, shortness of breath and wheezing.    Cardiovascular: Negative for chest pain, palpitations and leg swelling.   Gastrointestinal: Positive for abdominal distention and abdominal pain. Negative for constipation, diarrhea, nausea and vomiting.   Endocrine: Negative.    Genitourinary: Positive for flank pain. Negative for decreased urine volume, difficulty urinating, dysuria, hematuria and urgency.   Musculoskeletal: Negative for arthralgias, back pain and joint swelling.   Skin: Positive for wound. Negative for color change, pallor and rash.   Allergic/Immunologic: Positive for immunocompromised state.   Neurological: Positive for weakness. Negative for dizziness, tremors, seizures, syncope and headaches.   Hematological: Bruises/bleeds easily.   Psychiatric/Behavioral: Positive for sleep disturbance.  Negative for agitation, confusion, decreased concentration, dysphoric mood, hallucinations and suicidal ideas. The patient is not nervous/anxious.    All other systems reviewed and are negative.    Objective:     Vital Signs (Most Recent):  Temp: 98.6 °F (37 °C) (11/20/17 1111)  Pulse: 96 (11/20/17 1111)  Resp: 18 (11/20/17 1111)  BP: 117/69 (11/20/17 1111)  SpO2: 99 % (11/20/17 1111) Vital Signs (24h Range):  Temp:  [98.2 °F (36.8 °C)-100.2 °F (37.9 °C)] 98.6 °F (37 °C)  Pulse:  [] 96  Resp:  [16-20] 18  SpO2:  [98 %-100 %] 99 %  BP: (107-134)/(59-72) 117/69     Weight: 84.4 kg (186 lb 1.1 oz)  Body mass index is 29.14 kg/m².    Intake/Output - Last 3 Shifts       11/18 0700 - 11/19 0659 11/19 0700 - 11/20 0659 11/20 0700 - 11/21 0659    P.O. 1800 470     I.V. (mL/kg)  0 (0)     Blood   100    Other  0     Total Intake(mL/kg) 1800 (21.4) 470 (5.6) 100 (1.2)    Urine (mL/kg/hr) 320 (0.2) 2210 (1.1) 400 (0.7)    Emesis/NG output  0 (0)     Other  0 (0)     Stool 0 (0) 0 (0) 0 (0)    Blood  0 (0)     Total Output 320 2210 400    Net +1480 -1740 -300           Urine Occurrence  0 x     Stool Occurrence 0 x 0 x 0 x    Emesis Occurrence  0 x           Physical Exam   Constitutional: He is oriented to person, place, and time. He appears well-developed and well-nourished. No distress.   Temporal and distal extremity muscle wasting   HENT:   Head: Normocephalic and atraumatic.   Mouth/Throat: No oropharyngeal exudate.   Eyes: EOM are normal. Pupils are equal, round, and reactive to light. Scleral icterus is present.   Neck: Normal range of motion. Neck supple.   Cardiovascular: Normal rate, regular rhythm, normal heart sounds and intact distal pulses.  Exam reveals no gallop and no friction rub.    No murmur heard.  Pulmonary/Chest: Effort normal and breath sounds normal. No respiratory distress. He has no wheezes. He has no rales. He exhibits no tenderness.   Dec R base   Abdominal: Soft. Bowel sounds are normal. He  exhibits no distension. There is tenderness. There is no rebound and no guarding.       Musculoskeletal: Normal range of motion. He exhibits edema (Gen +2-3).   Neurological: He is alert and oriented to person, place, and time.   Skin: Skin is warm and dry. Capillary refill takes 2 to 3 seconds. He is not diaphoretic. There is erythema (ble, rlq abdomen).   Psychiatric: He has a normal mood and affect. His behavior is normal. Judgment and thought content normal.   Nursing note and vitals reviewed.      Laboratory:  Immunosuppressants         Stop Route Frequency     cycloSPORINE modified capsule 150 mg      -- Oral 2 times daily        CBC:     Recent Labs  Lab 11/20/17  0408   WBC 7.53   RBC 2.91*   HGB 8.6*   HCT 26.3*      MCV 90   MCH 29.6   MCHC 32.7     CMP:     Recent Labs  Lab 11/20/17  0408   GLU 86   CALCIUM 8.4*   ALBUMIN 2.1*   PROT 4.6*   *   K 3.5   CO2 24   CL 99   BUN 21*   CREATININE 3.9*   ALKPHOS 134   ALT 6*   AST 12   BILITOT 2.3*     Labs within the past 24 hours have been reviewed.    Diagnostic Results:  I have personally reviewed all pertinent imaging studies.    Assessment/Plan:     * Fever    - infectious work up initiated, blood cultures ngtd. Urine cx no growth.  - Vanc x 1 dose given 11/17.  - Started Augmentin q12h 11/18.   -Low grade temp, 100.2, overnight. Patient reports abdominal pain, radiating into his back specifically R side. Exam notable for edematous tissue of R flank. Also with edema and erythema of BLE. Will obtain US BLE to rule out DVT. Will also obtain CT A/P without contrast and Liver US as still with low grade fever.    -Continue Augmentin for possible cellulitis          Hypomagnesemia    - improved. Monitor.        Anemia of chronic disease    - H&H stable.  - Transfused 1 unit PRBCs yesterday 11/18.           Delayed surgical wound healing    - et nurse consulted, dressing changed 11/17  - sutures removed from abdomin and right groin 11/18  - wound  erythematous, give dose of vancomycin 11/17. Started Augmentin 11/18.  -Wound vac changed 11/20.          Seizure    - continue keppra.          At risk for opportunistic infections    - continue valcyte for CMV prophylaxis and atovaquone for pcp.          Long-term use of immunosuppressant medication    - continue cyclosporine and prednisone  - monitor cyclosporine levels daily and adjust for therapeutic dose  - hold cellcept due to likely infection          Prophylactic immunotherapy    - see long term immunosuppression.          Liver transplanted    - LFts stable, monitor labs daily          LAURA (acute kidney injury)    - nephrology consulted  - HD outpatient MWF via permacath  - Patient with increased UOP past 24 hours with lasix and diuril, 2200 cc from 320 cc.   -Still not clearing but creatinine stable at 3.9 from 3.4.   -Discussed with Nephrology and they are in agreement to hold off on HD today and continue with diuresis with lasix/albumin.   -Monitor kidney function closely for signs of recover              VTE Risk Mitigation         Ordered     heparin (porcine) injection 5,000 Units  Every 8 hours     Route:  Subcutaneous        11/20/17 0839     heparin (porcine) injection 1,000 Units  As needed (PRN)     Route:  Intra-Catheter        11/17/17 1628     Medium Risk of VTE  Once      11/17/17 0855     Place sequential compression device  Until discontinued      11/17/17 0855          The patients clinical status was discussed at multidisplinary rounds, involving transplant surgery, transplant medicine, pharmacy, nursing, nutrition, and social work    Discharge Planning: Not a candidate for discharge at this time.    Monitor kidney function --> may need outpt HD  Monitor HH needs vs rehab for deconditioned status      Citlali Bridges PAShahrzadC  Liver Transplant  [facility]

## 2017-11-20 NOTE — PROGRESS NOTES
2nd call placed to CT dept re: patient hasn't been started on oral contrast for CT, what is reasonable expectation for when patient will go for test?    They will have radiologist order contrast - when order is placed, have patient begin oral contrast per protocol.    1645: at bedside to start oral contrast, mom states they will call when he is done in restroom

## 2017-11-20 NOTE — ASSESSMENT & PLAN NOTE
- infectious work up initiated, blood cultures ngtd. Urine cx no growth.  - Vanc x 1 dose given 11/17.  - Started Augmentin q12h 11/18.   -Low grade temp, 100.2, overnight. Patient reports abdominal pain, radiating into his back specifically R side. Exam notable for edematous tissue of R flank. Also with edema and erythema of BLE. Will obtain US BLE to rule out DVT. Will also obtain CT A/P without contrast and Liver US as still with low grade fever.    -Continue Augmentin for possible cellulitis

## 2017-11-20 NOTE — ASSESSMENT & PLAN NOTE
LAURA secondary to hypotension/ischemic and toxic ATN now dialysis dependent   -HD [facility] MWF 3.5 hrs via R IJ TDC at [facility].  Last HD treatment was Friday, but was started on lasix 120 mg IV BID with improvement in his UOP.  Received a dose of diuril yesterday with >2L of UOP recorded.  SCr continues to increase, up to 3.9 today.  -No indication for dialysis today.  Will continue following SCr trends.  Will likely need clearance tomorrow, but will wait on AM labs to be resulted for final decision.  -recommend holding diuril dose today in setting of worsening hyponatremia despite maintaining net negative fluid status for the day.  -recommend continuing lasix 120 mg IV BID.  -Ok from renal standpoint to have regular diet.

## 2017-11-20 NOTE — PROGRESS NOTES
[facility]  Nephrology  Progress Note    Patient Name: Jhonny Diana  MRN: [id number]  Admission Date: 11/17/2017  Hospital Length of Stay: 3 days  Attending Provider: Jw Valdivia MD   Primary Care Physician: Primary Doctor No  Principal Problem:Fever    Subjective:     HPI: 28 yo male with significant history for asthma, hypertension, alcoholic hepatitis/ESLD sp transplant 10/19 then discharged on 11/8/17, and LAURA secondary to hypotension/ischemic and toxic ATN now dialysis dependent who is presented to hospital for fever 100.9 this 5 am associated with generalized body aches and lower abdominal pain x 2 days. Low grade temp 99.8 earlier yesterday. Denies chills, headaches, dizziness, change in vision, chest pain, shortness of breath, palpitations, diaphoresis, orthopnea, PND, nausea, vomiting, or focal extremities weakness/numbness. EKG . CXR right pleural fluid decrease from previous. Abd x ray question ileus. Temp 99.3. Hgb 7.6. No leukocytosis. Lytes K 3.3, Mg 1.4 other wise stable. Acid base stable. Lactate 0.8. Influenza AB negative.  Blood cultures pending. Vancomycin IV given in ED.    Nephrology consult for hemodialysis. HD [facility] MWF 3.5 hrs via R IJ TDC at [facility]. Last HD 11/5 pre wt 83 kg post wt 81.2 kg. EDW TBD. Residual function varies 150-500 ml/day with home lasix 80 mg daily. Denies any drainage from TDC site or tenderness. BUN/Cr 14/3.1.     Interval History:   UOP has increased over the weekend with use of diuretics.  Received 120 mg of IV lasix BID along with 1 time dose of diuril 500 IV yesterday with an increase in UOP to 2.1L/24h.  SCr continues to rise, although delta changes are improving.  Serum Na decreased this morning despite maintaining net negative volume status.      Review of patient's allergies indicates:   Allergen Reactions    Bactrim [sulfamethoxazole-trimethoprim] Other (See Comments)     Mookie Trell Syndrome     Current  Facility-Administered Medications   Medication Frequency    0.9%  NaCl infusion (for blood administration) Q24H PRN    0.9%  NaCl infusion PRN    acetaminophen tablet 650 mg Q8H PRN    amoxicillin-clavulanate 875-125mg per tablet 1 tablet Q12H    aspirin EC tablet 81 mg Daily    atovaquone suspension 1,500 mg Daily    cycloSPORINE modified capsule 150 mg BID    docusate sodium capsule 100 mg TID    [START ON 11/21/2017] ergocalciferol capsule 50,000 Units Q7 Days    fluconazole tablet 200 mg Daily    furosemide injection 120 mg BID    heparin (porcine) injection 1,000 Units PRN    heparin (porcine) injection 5,000 Units Q8H    HYDROmorphone injection 0.5 mg Q6H PRN    levETIRAcetam tablet 500 mg BID    multivitamin tablet 1 tablet Daily    ondansetron disintegrating tablet 8 mg Q8H PRN    oxyCODONE-acetaminophen  mg per tablet 1 tablet Q4H PRN    pantoprazole EC tablet 40 mg Daily    predniSONE tablet 5 mg Daily    ramelteon tablet 8 mg Nightly PRN    sodium chloride 0.9% flush 3 mL PRN    thiamine tablet 100 mg Daily    valGANciclovir tablet 450 mg Every Mon, Wed, Fri       Objective:     Vital Signs (Most Recent):  Temp: 98.6 °F (37 °C) (11/20/17 1111)  Pulse: 96 (11/20/17 1111)  Resp: 18 (11/20/17 1111)  BP: 117/69 (11/20/17 1111)  SpO2: 99 % (11/20/17 1111)  O2 Device (Oxygen Therapy): room air (11/20/17 0752) Vital Signs (24h Range):  Temp:  [98.2 °F (36.8 °C)-100.2 °F (37.9 °C)] 98.6 °F (37 °C)  Pulse:  [] 96  Resp:  [16-20] 18  SpO2:  [98 %-100 %] 99 %  BP: (107-134)/(59-72) 117/69     Weight: 84.4 kg (186 lb 1.1 oz) (11/20/17 0500)  Body mass index is 29.14 kg/m².  Body surface area is 2 meters squared.    I/O last 3 completed shifts:  In: 1190 [P.O.:1190]  Out: 2420 [Urine:2420]    Physical Exam   Constitutional: He is oriented to person, place, and time. He appears well-developed and well-nourished.   HENT:   Head: Atraumatic.   Eyes: EOM are normal. Scleral icterus is  present.   Neck: Neck supple.   Pulmonary/Chest: Effort normal and breath sounds normal.   RLL diminished otherwise clear   Abdominal: Soft. Bowel sounds are normal. He exhibits distension. There is tenderness.   Wound vac intact   Musculoskeletal: Normal range of motion. He exhibits edema (3+ pitting ).   Neurological: He is alert and oriented to person, place, and time.   Skin: Skin is warm and dry.   Psychiatric: He has a normal mood and affect.       Significant Labs:  CBC:   Recent Labs  Lab 11/20/17  0408   WBC 7.53   RBC 2.91*   HGB 8.6*   HCT 26.3*      MCV 90   MCH 29.6   MCHC 32.7     CMP:   Recent Labs  Lab 11/20/17  0408   GLU 86   CALCIUM 8.4*   ALBUMIN 2.1*   PROT 4.6*   *   K 3.5   CO2 24   CL 99   BUN 21*   CREATININE 3.9*   ALKPHOS 134   ALT 6*   AST 12   BILITOT 2.3*          Assessment/Plan:     LAURA secondary to hypotension/ischemic and toxic ATN now dialysis dependent   -HD [facility] MWF 3.5 hrs via R IJ TDC at [facility].  Last HD treatment was Friday, but was started on lasix 120 mg IV BID with improvement in his UOP.  Received a dose of diuril yesterday with >2L of UOP recorded.  SCr continues to increase, up to 3.9 today.  -No indication for dialysis today.  Will continue following SCr trends.  Will likely need clearance tomorrow, but will wait on AM labs to be resulted for final decision.  -recommend holding diuril dose today in setting of worsening hyponatremia despite maintaining net negative fluid status for the day.  -recommend continuing lasix 120 mg IV BID.  -Ok from renal standpoint to have regular diet.    Derrick Love NP  Nephrology  [facility]  Pager:  [phone]

## 2017-11-20 NOTE — ASSESSMENT & PLAN NOTE
- et nurse consulted, dressing changed 11/17  - sutures removed from abdomin and right groin 11/18  - wound erythematous, give dose of vancomycin 11/17. Started Augmentin 11/18.  -Wound vac changed 11/20.

## 2017-11-20 NOTE — SUBJECTIVE & OBJECTIVE
Scheduled Meds:   amoxicillin-clavulanate 875-125mg  1 tablet Oral Q12H    aspirin  81 mg Oral Daily    atovaquone  1,500 mg Oral Daily    cycloSPORINE modified (NEORAL)  150 mg Oral BID    docusate sodium  100 mg Oral TID    [START ON 11/21/2017] ergocalciferol  50,000 Units Oral Q7 Days    fluconazole  200 mg Oral Daily    furosemide  120 mg Intravenous BID    heparin (porcine)  5,000 Units Subcutaneous Q8H    levETIRAcetam  500 mg Oral BID    multivitamin  1 tablet Oral Daily    pantoprazole  40 mg Oral Daily    predniSONE  5 mg Oral Daily    thiamine  100 mg Oral Daily    valGANciclovir  450 mg Oral Every Mon, Wed, Fri     Continuous Infusions:   PRN Meds:sodium chloride, sodium chloride 0.9%, acetaminophen, heparin (porcine), HYDROmorphone, ondansetron, oxyCODONE-acetaminophen, ramelteon, sodium chloride 0.9%    Review of Systems   Constitutional: Positive for appetite change (improving). Negative for activity change, chills, diaphoresis, fatigue and fever.   HENT: Negative for congestion, mouth sores and trouble swallowing.    Eyes: Negative for pain, discharge and visual disturbance.   Respiratory: Negative for cough, chest tightness, shortness of breath and wheezing.    Cardiovascular: Negative for chest pain, palpitations and leg swelling.   Gastrointestinal: Positive for abdominal distention and abdominal pain. Negative for constipation, diarrhea, nausea and vomiting.   Endocrine: Negative.    Genitourinary: Positive for flank pain. Negative for decreased urine volume, difficulty urinating, dysuria, hematuria and urgency.   Musculoskeletal: Negative for arthralgias, back pain and joint swelling.   Skin: Positive for wound. Negative for color change, pallor and rash.   Allergic/Immunologic: Positive for immunocompromised state.   Neurological: Positive for weakness. Negative for dizziness, tremors, seizures, syncope and headaches.   Hematological: Bruises/bleeds easily.    Psychiatric/Behavioral: Positive for sleep disturbance. Negative for agitation, confusion, decreased concentration, dysphoric mood, hallucinations and suicidal ideas. The patient is not nervous/anxious.    All other systems reviewed and are negative.    Objective:     Vital Signs (Most Recent):  Temp: 98.6 °F (37 °C) (11/20/17 1111)  Pulse: 96 (11/20/17 1111)  Resp: 18 (11/20/17 1111)  BP: 117/69 (11/20/17 1111)  SpO2: 99 % (11/20/17 1111) Vital Signs (24h Range):  Temp:  [98.2 °F (36.8 °C)-100.2 °F (37.9 °C)] 98.6 °F (37 °C)  Pulse:  [] 96  Resp:  [16-20] 18  SpO2:  [98 %-100 %] 99 %  BP: (107-134)/(59-72) 117/69     Weight: 84.4 kg (186 lb 1.1 oz)  Body mass index is 29.14 kg/m².    Intake/Output - Last 3 Shifts       11/18 0700 - 11/19 0659 11/19 0700 - 11/20 0659 11/20 0700 - 11/21 0659    P.O. 1800 470     I.V. (mL/kg)  0 (0)     Blood   100    Other  0     Total Intake(mL/kg) 1800 (21.4) 470 (5.6) 100 (1.2)    Urine (mL/kg/hr) 320 (0.2) 2210 (1.1) 400 (0.7)    Emesis/NG output  0 (0)     Other  0 (0)     Stool 0 (0) 0 (0) 0 (0)    Blood  0 (0)     Total Output 320 2210 400    Net +1480 -1740 -300           Urine Occurrence  0 x     Stool Occurrence 0 x 0 x 0 x    Emesis Occurrence  0 x           Physical Exam   Constitutional: He is oriented to person, place, and time. He appears well-developed and well-nourished. No distress.   Temporal and distal extremity muscle wasting   HENT:   Head: Normocephalic and atraumatic.   Mouth/Throat: No oropharyngeal exudate.   Eyes: EOM are normal. Pupils are equal, round, and reactive to light. Scleral icterus is present.   Neck: Normal range of motion. Neck supple.   Cardiovascular: Normal rate, regular rhythm, normal heart sounds and intact distal pulses.  Exam reveals no gallop and no friction rub.    No murmur heard.  Pulmonary/Chest: Effort normal and breath sounds normal. No respiratory distress. He has no wheezes. He has no rales. He exhibits no tenderness.    Dec R base   Abdominal: Soft. Bowel sounds are normal. He exhibits no distension. There is tenderness. There is no rebound and no guarding.       Musculoskeletal: Normal range of motion. He exhibits edema (Gen +2-3).   Neurological: He is alert and oriented to person, place, and time.   Skin: Skin is warm and dry. Capillary refill takes 2 to 3 seconds. He is not diaphoretic. There is erythema (ble, rlq abdomen).   Psychiatric: He has a normal mood and affect. His behavior is normal. Judgment and thought content normal.   Nursing note and vitals reviewed.      Laboratory:  Immunosuppressants         Stop Route Frequency     cycloSPORINE modified capsule 150 mg      -- Oral 2 times daily        CBC:     Recent Labs  Lab 11/20/17  0408   WBC 7.53   RBC 2.91*   HGB 8.6*   HCT 26.3*      MCV 90   MCH 29.6   MCHC 32.7     CMP:     Recent Labs  Lab 11/20/17  0408   GLU 86   CALCIUM 8.4*   ALBUMIN 2.1*   PROT 4.6*   *   K 3.5   CO2 24   CL 99   BUN 21*   CREATININE 3.9*   ALKPHOS 134   ALT 6*   AST 12   BILITOT 2.3*     Labs within the past 24 hours have been reviewed.    Diagnostic Results:  I have personally reviewed all pertinent imaging studies.

## 2017-11-20 NOTE — SUBJECTIVE & OBJECTIVE
Interval History:   UOP has increased over the weekend with use of diuretics.  Received 120 mg of IV lasix BID along with 1 time dose of diuril 500 IV yesterday with an increase in UOP to 2.1L/24h.  SCr continues to rise, although delta changes are improving.  Serum Na decreased this morning despite maintaining net negative volume status.      Review of patient's allergies indicates:   Allergen Reactions    Bactrim [sulfamethoxazole-trimethoprim] Other (See Comments)     Mookie Trell Syndrome     Current Facility-Administered Medications   Medication Frequency    0.9%  NaCl infusion (for blood administration) Q24H PRN    0.9%  NaCl infusion PRN    acetaminophen tablet 650 mg Q8H PRN    amoxicillin-clavulanate 875-125mg per tablet 1 tablet Q12H    aspirin EC tablet 81 mg Daily    atovaquone suspension 1,500 mg Daily    cycloSPORINE modified capsule 150 mg BID    docusate sodium capsule 100 mg TID    [START ON 11/21/2017] ergocalciferol capsule 50,000 Units Q7 Days    fluconazole tablet 200 mg Daily    furosemide injection 120 mg BID    heparin (porcine) injection 1,000 Units PRN    heparin (porcine) injection 5,000 Units Q8H    HYDROmorphone injection 0.5 mg Q6H PRN    levETIRAcetam tablet 500 mg BID    multivitamin tablet 1 tablet Daily    ondansetron disintegrating tablet 8 mg Q8H PRN    oxyCODONE-acetaminophen  mg per tablet 1 tablet Q4H PRN    pantoprazole EC tablet 40 mg Daily    predniSONE tablet 5 mg Daily    ramelteon tablet 8 mg Nightly PRN    sodium chloride 0.9% flush 3 mL PRN    thiamine tablet 100 mg Daily    valGANciclovir tablet 450 mg Every Mon, Wed, Fri       Objective:     Vital Signs (Most Recent):  Temp: 98.6 °F (37 °C) (11/20/17 1111)  Pulse: 96 (11/20/17 1111)  Resp: 18 (11/20/17 1111)  BP: 117/69 (11/20/17 1111)  SpO2: 99 % (11/20/17 1111)  O2 Device (Oxygen Therapy): room air (11/20/17 0752) Vital Signs (24h Range):  Temp:  [98.2 °F (36.8 °C)-100.2 °F (37.9  °C)] 98.6 °F (37 °C)  Pulse:  [] 96  Resp:  [16-20] 18  SpO2:  [98 %-100 %] 99 %  BP: (107-134)/(59-72) 117/69     Weight: 84.4 kg (186 lb 1.1 oz) (11/20/17 0500)  Body mass index is 29.14 kg/m².  Body surface area is 2 meters squared.    I/O last 3 completed shifts:  In: 1190 [P.O.:1190]  Out: 2420 [Urine:2420]    Physical Exam   Constitutional: He is oriented to person, place, and time. He appears well-developed and well-nourished.   HENT:   Head: Atraumatic.   Eyes: EOM are normal. Scleral icterus is present.   Neck: Neck supple.   Pulmonary/Chest: Effort normal and breath sounds normal.   RLL diminished otherwise clear   Abdominal: Soft. Bowel sounds are normal. He exhibits distension. There is tenderness.   Wound vac intact   Musculoskeletal: Normal range of motion. He exhibits edema (3+ pitting ).   Neurological: He is alert and oriented to person, place, and time.   Skin: Skin is warm and dry.   Psychiatric: He has a normal mood and affect.       Significant Labs:  CBC:   Recent Labs  Lab 11/20/17  0408   WBC 7.53   RBC 2.91*   HGB 8.6*   HCT 26.3*      MCV 90   MCH 29.6   MCHC 32.7     CMP:   Recent Labs  Lab 11/20/17  0408   GLU 86   CALCIUM 8.4*   ALBUMIN 2.1*   PROT 4.6*   *   K 3.5   CO2 24   CL 99   BUN 21*   CREATININE 3.9*   ALKPHOS 134   ALT 6*   AST 12   BILITOT 2.3*

## 2017-11-20 NOTE — PLAN OF CARE
"Problem: Patient Care Overview  Goal: Plan of Care Review  Outcome: Ongoing (interventions implemented as appropriate)  AAOx3, TMAX 100.2, c/o pain.  Pain mildly controlled by prn percocet and IV dilaudid for breakthrough pain.  Pain in right side.  Pt stated "feels like someone has a sewing machine on my side."  24 hour creatinine clearance started @ 0900 on 11/19/17 and ends @0900 11/20/17.  Pt understands the importance of measuring and keeping urine.  Plan for possible dialysis today.  Wound vac to incision @-125 continuous. Back up cannister in the room.  Telemetry monitor in place  (NSR- Sinus tach).  Pt on Augmentin q12hr.  +3 edema to lower extremities.  Assessed dorsal pedis pulses via doppler.  Pt able to position self independently in bed. Stand by assist when out of bed. Pt in lowest position, side rails up x2, non-skid foot wear in place, call light within reach, pt verbalized understanding to call RN when needed. Mother at bedside. Hand hygiene practiced per protocol. Will continue to monitor.       "

## 2017-11-20 NOTE — ASSESSMENT & PLAN NOTE
- nephrology consulted  - HD outpatient MWF via permacath  - Patient with increased UOP past 24 hours with lasix and diuril, 2200 cc from 320 cc.   -Still not clearing but creatinine stable at 3.9 from 3.4.   -Discussed with Nephrology and they are in agreement to hold off on HD today and continue with diuresis with lasix/albumin.   -Monitor kidney function closely for signs of recover

## 2017-11-21 VITALS
OXYGEN SATURATION: 100 % | TEMPERATURE: 98 F | HEIGHT: 67 IN | BODY MASS INDEX: 29.41 KG/M2 | SYSTOLIC BLOOD PRESSURE: 126 MMHG | HEART RATE: 93 BPM | WEIGHT: 187.38 LBS | RESPIRATION RATE: 18 BRPM | DIASTOLIC BLOOD PRESSURE: 69 MMHG

## 2017-11-21 LAB
ALBUMIN SERPL BCP-MCNC: 2.1 G/DL
ALP SERPL-CCNC: 127 U/L
ALT SERPL W/O P-5'-P-CCNC: <5 U/L
ANION GAP SERPL CALC-SCNC: 11 MMOL/L
AST SERPL-CCNC: 9 U/L
BASOPHILS # BLD AUTO: 0.04 K/UL
BASOPHILS NFR BLD: 0.6 %
BILIRUB SERPL-MCNC: 2.3 MG/DL
BUN SERPL-MCNC: 24 MG/DL
CALCIUM SERPL-MCNC: 8.4 MG/DL
CHLORIDE SERPL-SCNC: 98 MMOL/L
CO2 SERPL-SCNC: 23 MMOL/L
CREAT SERPL-MCNC: 4 MG/DL
CYCLOSPORINE BLD LC/MS/MS-MCNC: 283 NG/ML
DIFFERENTIAL METHOD: ABNORMAL
EOSINOPHIL # BLD AUTO: 0 K/UL
EOSINOPHIL NFR BLD: 0.1 %
ERYTHROCYTE [DISTWIDTH] IN BLOOD BY AUTOMATED COUNT: 17.8 %
EST. GFR  (AFRICAN AMERICAN): 22.1 ML/MIN/1.73 M^2
EST. GFR  (NON AFRICAN AMERICAN): 19.1 ML/MIN/1.73 M^2
GLUCOSE SERPL-MCNC: 81 MG/DL
HCT VFR BLD AUTO: 23.7 %
HGB BLD-MCNC: 8 G/DL
IMM GRANULOCYTES # BLD AUTO: 0.05 K/UL
IMM GRANULOCYTES NFR BLD AUTO: 0.7 %
LYMPHOCYTES # BLD AUTO: 1.4 K/UL
LYMPHOCYTES NFR BLD: 20.9 %
MAGNESIUM SERPL-MCNC: 1.5 MG/DL
MCH RBC QN AUTO: 30.7 PG
MCHC RBC AUTO-ENTMCNC: 33.8 G/DL
MCV RBC AUTO: 91 FL
MONOCYTES # BLD AUTO: 1.5 K/UL
MONOCYTES NFR BLD: 21.8 %
NEUTROPHILS # BLD AUTO: 3.8 K/UL
NEUTROPHILS NFR BLD: 55.9 %
NRBC BLD-RTO: 0 /100 WBC
PHOSPHATE SERPL-MCNC: 2.5 MG/DL
PLATELET # BLD AUTO: 259 K/UL
PMV BLD AUTO: 9.4 FL
POTASSIUM SERPL-SCNC: 3.4 MMOL/L
PROT SERPL-MCNC: 4.6 G/DL
RBC # BLD AUTO: 2.61 M/UL
SODIUM SERPL-SCNC: 132 MMOL/L
WBC # BLD AUTO: 6.83 K/UL

## 2017-11-21 PROCEDURE — 25000003 PHARM REV CODE 250: Performed by: PHYSICIAN ASSISTANT

## 2017-11-21 PROCEDURE — 80158 DRUG ASSAY CYCLOSPORINE: CPT

## 2017-11-21 PROCEDURE — 97116 GAIT TRAINING THERAPY: CPT

## 2017-11-21 PROCEDURE — 63600175 PHARM REV CODE 636 W HCPCS: Performed by: NURSE PRACTITIONER

## 2017-11-21 PROCEDURE — 63600175 PHARM REV CODE 636 W HCPCS: Performed by: PHYSICIAN ASSISTANT

## 2017-11-21 PROCEDURE — 99233 SBSQ HOSP IP/OBS HIGH 50: CPT | Mod: ,,, | Performed by: NURSE PRACTITIONER

## 2017-11-21 PROCEDURE — 36415 COLL VENOUS BLD VENIPUNCTURE: CPT

## 2017-11-21 PROCEDURE — 83735 ASSAY OF MAGNESIUM: CPT

## 2017-11-21 PROCEDURE — 85025 COMPLETE CBC W/AUTO DIFF WBC: CPT

## 2017-11-21 PROCEDURE — 25000003 PHARM REV CODE 250: Performed by: NURSE PRACTITIONER

## 2017-11-21 PROCEDURE — 97161 PT EVAL LOW COMPLEX 20 MIN: CPT

## 2017-11-21 PROCEDURE — 84100 ASSAY OF PHOSPHORUS: CPT

## 2017-11-21 PROCEDURE — 99239 HOSP IP/OBS DSCHRG MGMT >30: CPT | Mod: 24,,, | Performed by: PHYSICIAN ASSISTANT

## 2017-11-21 PROCEDURE — 25000003 PHARM REV CODE 250: Performed by: STUDENT IN AN ORGANIZED HEALTH CARE EDUCATION/TRAINING PROGRAM

## 2017-11-21 PROCEDURE — 80053 COMPREHEN METABOLIC PANEL: CPT

## 2017-11-21 RX ORDER — METOLAZONE 5 MG/1
5 TABLET ORAL
Qty: 12 TABLET | Refills: 2 | Status: ON HOLD | OUTPATIENT
Start: 2017-11-22 | End: 2017-11-30 | Stop reason: ALTCHOICE

## 2017-11-21 RX ORDER — MYCOPHENOLATE MOFETIL 250 MG/1
500 CAPSULE ORAL 2 TIMES DAILY
Status: DISCONTINUED | OUTPATIENT
Start: 2017-11-21 | End: 2017-11-21 | Stop reason: HOSPADM

## 2017-11-21 RX ORDER — TORSEMIDE 20 MG/1
40 TABLET ORAL 2 TIMES DAILY
Qty: 60 TABLET | Refills: 2 | Status: SHIPPED | OUTPATIENT
Start: 2017-11-21 | End: 2017-11-21 | Stop reason: ALTCHOICE

## 2017-11-21 RX ORDER — MAGNESIUM SULFATE HEPTAHYDRATE 40 MG/ML
2 INJECTION, SOLUTION INTRAVENOUS ONCE
Status: COMPLETED | OUTPATIENT
Start: 2017-11-21 | End: 2017-11-21

## 2017-11-21 RX ORDER — MYCOPHENOLATE MOFETIL 250 MG/1
500 CAPSULE ORAL 2 TIMES DAILY
Qty: 240 CAPSULE | Refills: 2 | Status: ON HOLD | OUTPATIENT
Start: 2017-11-21 | End: 2017-12-20 | Stop reason: ALTCHOICE

## 2017-11-21 RX ORDER — SYRING-NEEDL,DISP,INSUL,0.3 ML 29 G X1/2"
300 SYRINGE, EMPTY DISPOSABLE MISCELLANEOUS ONCE
Status: COMPLETED | OUTPATIENT
Start: 2017-11-21 | End: 2017-11-21

## 2017-11-21 RX ORDER — FUROSEMIDE 40 MG/1
120 TABLET ORAL 2 TIMES DAILY
Qty: 180 TABLET | Refills: 2 | Status: ON HOLD | OUTPATIENT
Start: 2017-11-21 | End: 2017-11-30 | Stop reason: ALTCHOICE

## 2017-11-21 RX ORDER — PSEUDOEPHEDRINE/ACETAMINOPHEN 30MG-500MG
100 TABLET ORAL ONCE
Status: COMPLETED | OUTPATIENT
Start: 2017-11-21 | End: 2017-11-21

## 2017-11-21 RX ORDER — AMOXICILLIN AND CLAVULANATE POTASSIUM 500; 125 MG/1; MG/1
1 TABLET, FILM COATED ORAL 2 TIMES DAILY
Qty: 16 TABLET | Refills: 0 | Status: SHIPPED | OUTPATIENT
Start: 2017-11-18 | End: 2017-11-24

## 2017-11-21 RX ORDER — CYCLOSPORINE 25 MG/1
50 CAPSULE, LIQUID FILLED ORAL 2 TIMES DAILY
Qty: 120 CAPSULE | Refills: 11 | Status: ON HOLD | OUTPATIENT
Start: 2017-11-21 | End: 2017-11-30 | Stop reason: ALTCHOICE

## 2017-11-21 RX ORDER — POTASSIUM CHLORIDE 20 MEQ/1
20 TABLET, EXTENDED RELEASE ORAL 2 TIMES DAILY
Status: DISCONTINUED | OUTPATIENT
Start: 2017-11-21 | End: 2017-11-21 | Stop reason: HOSPADM

## 2017-11-21 RX ORDER — VALGANCICLOVIR 450 MG/1
450 TABLET, FILM COATED ORAL
Qty: 12 TABLET | Refills: 2 | Status: ON HOLD | OUTPATIENT
Start: 2017-11-22 | End: 2017-12-20 | Stop reason: ALTCHOICE

## 2017-11-21 RX ORDER — AMOXICILLIN AND CLAVULANATE POTASSIUM 500; 125 MG/1; MG/1
1 TABLET, FILM COATED ORAL 2 TIMES DAILY
Status: DISCONTINUED | OUTPATIENT
Start: 2017-11-21 | End: 2017-11-21 | Stop reason: HOSPADM

## 2017-11-21 RX ADMIN — OXYCODONE AND ACETAMINOPHEN 1 TABLET: 10; 325 TABLET ORAL at 07:11

## 2017-11-21 RX ADMIN — OXYCODONE AND ACETAMINOPHEN 1 TABLET: 10; 325 TABLET ORAL at 06:11

## 2017-11-21 RX ADMIN — AMOXICILLIN AND CLAVULANATE POTASSIUM 500 MG: 500; 125 TABLET, FILM COATED ORAL at 09:11

## 2017-11-21 RX ADMIN — FUROSEMIDE 120 MG: 10 INJECTION, SOLUTION INTRAVENOUS at 11:11

## 2017-11-21 RX ADMIN — ATOVAQUONE 1500 MG: 750 SUSPENSION ORAL at 06:11

## 2017-11-21 RX ADMIN — PANTOPRAZOLE SODIUM 40 MG: 40 TABLET, DELAYED RELEASE ORAL at 06:11

## 2017-11-21 RX ADMIN — MYCOPHENOLATE MOFETIL 500 MG: 250 CAPSULE ORAL at 11:11

## 2017-11-21 RX ADMIN — ASPIRIN 81 MG: 81 TABLET, COATED ORAL at 09:11

## 2017-11-21 RX ADMIN — THERA TABS 1 TABLET: TAB at 06:11

## 2017-11-21 RX ADMIN — ERGOCALCIFEROL 50000 UNITS: 1.25 CAPSULE ORAL at 09:11

## 2017-11-21 RX ADMIN — FLUCONAZOLE 200 MG: 200 TABLET ORAL at 09:11

## 2017-11-21 RX ADMIN — CYCLOSPORINE 150 MG: 100 CAPSULE, LIQUID FILLED ORAL at 06:11

## 2017-11-21 RX ADMIN — POTASSIUM CHLORIDE 20 MEQ: 1500 TABLET, EXTENDED RELEASE ORAL at 11:11

## 2017-11-21 RX ADMIN — OXYCODONE AND ACETAMINOPHEN 1 TABLET: 10; 325 TABLET ORAL at 02:11

## 2017-11-21 RX ADMIN — MAGNESIUM SULFATE IN WATER 2 G: 40 INJECTION, SOLUTION INTRAVENOUS at 11:11

## 2017-11-21 RX ADMIN — HEPARIN SODIUM 5000 UNITS: 5000 INJECTION, SOLUTION INTRAVENOUS; SUBCUTANEOUS at 06:11

## 2017-11-21 RX ADMIN — HYDROMORPHONE HYDROCHLORIDE 0.5 MG: 1 INJECTION, SOLUTION INTRAMUSCULAR; INTRAVENOUS; SUBCUTANEOUS at 09:11

## 2017-11-21 RX ADMIN — THIAMINE HCL (VITAMIN B1) 50 MG TABLET 100 MG: at 06:11

## 2017-11-21 RX ADMIN — SODIUM CHLORIDE 500 ML: 0.9 INJECTION, SOLUTION INTRAVENOUS at 11:11

## 2017-11-21 RX ADMIN — DOCUSATE SODIUM 100 MG: 100 CAPSULE, LIQUID FILLED ORAL at 06:11

## 2017-11-21 RX ADMIN — FUROSEMIDE 120 MG: 10 INJECTION, SOLUTION INTRAVENOUS at 06:11

## 2017-11-21 RX ADMIN — HYDROMORPHONE HYDROCHLORIDE 0.5 MG: 1 INJECTION, SOLUTION INTRAMUSCULAR; INTRAVENOUS; SUBCUTANEOUS at 07:11

## 2017-11-21 RX ADMIN — Medication 100 ML: at 05:11

## 2017-11-21 RX ADMIN — PREDNISONE 5 MG: 5 TABLET ORAL at 06:11

## 2017-11-21 RX ADMIN — DOCUSATE SODIUM 100 MG: 100 CAPSULE, LIQUID FILLED ORAL at 01:11

## 2017-11-21 RX ADMIN — LEVETIRACETAM 500 MG: 500 TABLET ORAL at 09:11

## 2017-11-21 RX ADMIN — MAGESIUM CITRATE 300 ML: 1.75 LIQUID ORAL at 05:11

## 2017-11-21 RX ADMIN — OXYCODONE AND ACETAMINOPHEN 1 TABLET: 10; 325 TABLET ORAL at 11:11

## 2017-11-21 NOTE — ASSESSMENT & PLAN NOTE
LAURA secondary to hypotension/ischemic and toxic ATN now dialysis dependent   -[facility] R IJ [facility]DC [facility].  Last HD treatment was Friday, but was started on lasix 120 mg IV BID with improvement in his UOP.  -UOP of 1.6L yesterday with 1 unmeasured occurrence.  SCr increased to 4.0 from 3.9, appears to be plateauing.  Primary team planning on discharge today.  Because of holiday schedule at outpatient dialysis unit, he will not be due for treatment again till Friday if needed, although it appears patient is recovering from ATN and will likely not need RRT.  -ok from renal standpoint for discharge.  Labs later this week to determine if he will need RRT on Friday as outpatient.  -recommend discharging on lasix 120 mg PO BID with Metolazone 5 mg PO TIW on MWF.

## 2017-11-21 NOTE — PLAN OF CARE
Problem: Patient Care Overview  Goal: Plan of Care Review  Outcome: Ongoing (interventions implemented as appropriate)  AAOx3, Tmax 99.4, c/o pain.  Pain controlled by prn pain medication. Telemetry monitor in place (NSR- Sinus tach).  Wound vac CDI to continuous -125 suction.  Pt went to CT of the abdomen last night.  Pt able to position self independently. Pt in lowest position, side rails up x2, non-skid foot wear in place, call light within reach, pt verbalized understanding to call RN when needed. Hand hygiene practiced per protocol. Hand hygiene practiced per protocol. Will continue to monitor.

## 2017-11-21 NOTE — PROGRESS NOTES
"Discharge Note: Patient tentatively scheduled to be discharged today to [facility] [address] locally with mom Sanjuanita Diana ph# [phone].  EZEKIEL met with pt and pt's mom this morning.  Pt was observed to sitting upright in the bed alert, oriented, communicative and asking questions appropriately. SW discussed discharge plans with the patient and caregiver. Both reported adequate coping.  Pt voiced frustration with "not feeling heard by some healthcare team members.  SW provided contact information for patient relations.  Pt is set up for outpatient HD at [facility] MWF 3pm, pt reports understanding that him reporting to HD depends on his labs and instruction for his Post Coordinator or physician.     EZEKIEL lvm for [place] at [facility] ([phone])  that pt will discharge from the hospital and report to HD depending on his labs.  Pt has his home wound vac from [facility] in hospital room and will resume home health with [facility] for SN wound care and PT. EZEKIEL notified Brandy ext [phone] of pts discharge.  There were no further questions or concerns at this time. Patient verbalized understanding and agreement with the information reviewed, social work availability, and how to access available resources if needed. SW remains available.    "

## 2017-11-21 NOTE — DISCHARGE SUMMARY
[facility]  Liver Transplant  Discharge Summary      Patient Name: Jhonny Diana  MRN: [id number]  Admission Date: 2017  Hospital Length of Stay: 4 days  Discharge Date and Time:  2017 3:28 PM  Attending Physician: Jw Valdivia MD   Discharging Provider: Citlali Bridges PA-C  Primary Care Provider: Primary Doctor No  Post-Operative Day: 33     ORGAN:   LIVER  Disease Etiology: Acute Alcoholic Hepatitis  Donor Type:    - Brain Death  CDC High Risk:   No  Donor CMV Status:   Donor CMV Status: Positive  Donor HBcAB:   Negative  Donor HCV Status:   Negative  Whole or Partial: Whole Liver  Biliary Anastomosis: End to End  Arterial Anatomy: Standard    Hospital Course: 26 y/o male with pmh of ESLD secondary ETOH.  Now s/p oltx 10/19/2017 (Liver), which took place without complication.  Steroid induction, CMV +/+.  Post op complicated by LAURA, seizures, and delayed wound healing requiring wound vac to entire chevron incision. Patient was admitted to the hospital with 1 day history of low grade fever, tmax 100.9. He reported having increased abdominal pain, particularly rlq radiating to right flank.  Infectious work up initiated on admit. Blood and urine cx with NGTD. CT A/P obtained, overall unremarkable, did show moderate amount of stool in colon, possible enteritis, along with body wall edema. Liver US also obtained showing good allograft perfusion and 2 small fluid collections. Physical exam notable body wall edema particularly along R flank and for possible cellulitis around old IRISH drain sites. Sutures were removed with small amount of purulent drainage expressed. He received 1 dose of Vancomycin and then was transitioned to Augmentin for treatment of presumed cellulitis for a total of 10 days, stop date 17.     Of significance, patient with ongoing ATN since transplant requiring HD MWF. Nephrology consulted on admission. He did receive HD on . He was then  placed on diuretics, lasix and diuril, with improvement in UOP seen. Patient making large volume of urine at time of discharge on lasix 120 mg BID. He has not required HD since 11/17. Creatinine appears to be plateauing, SCr 3.9-->4.0 at time of discharge. Nephrology comfortable holding off on HD at time of discharge, electrolytes stable, volume status stable) and are hopeful will start to show signs of renal clearance soon. He will follow up with labs tomorrow 11/22 and Friday 11/24 to assess need for HD as an outpatient on Friday. He will be discharged home on lasix 120 mg BID and metolazone 5 mg on MWF. Creatinine will need to be monitored closely as an outpatient to ensure renal recovery. If patient no longer to need outpatient HD, permcath dialysis catheter will need to be scheduled to be removed as an outpatient.     Maintenance immunosuppressive therapy: cyclosporine 150mg BID, cellcept originally on hold due to infection then re-initiated at half dose (500mg BID), continue prednisone taper    Opportunisitic prophylaxis: atovaquone, diflucan and valcyte. Of note, valcyte dose increased to 450mg TIW based on increased UOP. Valcyte dose may need to be re-adjusted based on renal function outpatient.    On day of discharge, patient feeling well. He is stable and ready for discharge. He will follow up with labs on Wednesday 11/22 and Friday 11/24 to assess need for outpatient HD. He will continue to receive homehealth PT, wound care, and skilled nursing upon discharge. Patient verbalized his understanding prior to discharge.      Final Active Diagnoses:    Diagnosis Date Noted POA    PRINCIPAL PROBLEM:  Fever [R50.9] 11/17/2017 Yes    Anemia of chronic disease [D63.8] 11/18/2017 Yes    Hypomagnesemia [E83.42] 11/18/2017 Yes    Delayed surgical wound healing [T81.89XA] 11/17/2017 Yes    Seizure [R56.9] 11/02/2017 Yes    At risk for opportunistic infections [Z91.89] 10/26/2017 Yes    Liver transplanted  [Z94.4] 10/20/2017 Not Applicable     Chronic    Prophylactic immunotherapy [Z29.8] 10/20/2017 Not Applicable     Chronic    Long-term use of immunosuppressant medication [Z79.899] 10/20/2017 Not Applicable     Chronic    LAURA (acute kidney injury) [N17.9] 10/13/2017 Yes      Problems Resolved During this Admission:    Diagnosis Date Noted Date Resolved POA    Delayed wound healing [T14.8XXD] 11/17/2017 11/18/2017 Yes    Abnormal finding on MRI of brain [R90.89] 11/02/2017 11/18/2017 Yes    Physical deconditioning [R53.81] 10/26/2017 11/18/2017 Yes       Consults         Status Ordering Provider     Inpatient consult to Nephrology  Once     Provider:  (Not yet assigned)    Completed VANDANA SAMUEL     Inpatient consult to PICC team ([facility]AS)  Once     Provider:  (Not yet assigned)    Completed INDRA DANIEL          Pending Diagnostic Studies:     None        Significant Diagnostic Studies: Labs:   CBC:  Results for RAFFY THOMAS (MRN [id number]) as of 11/22/2017 08:39   Ref. Range 11/21/2017 05:02   WBC Latest Ref Range: 3.90 - 12.70 K/uL 6.83   RBC Latest Ref Range: 4.60 - 6.20 M/uL 2.61 (L)   Hemoglobin Latest Ref Range: 14.0 - 18.0 g/dL 8.0 (L)   Hematocrit Latest Ref Range: 40.0 - 54.0 % 23.7 (L)   MCV Latest Ref Range: 82 - 98 fL 91   MCH Latest Ref Range: 27.0 - 31.0 pg 30.7   MCHC Latest Ref Range: 32.0 - 36.0 g/dL 33.8   RDW Latest Ref Range: 11.5 - 14.5 % 17.8 (H)   Platelets Latest Ref Range: 150 - 350 K/uL 259   MPV Latest Ref Range: 9.2 - 12.9 fL 9.4   Gran% Latest Ref Range: 38.0 - 73.0 % 55.9   Gran # Latest Ref Range: 1.8 - 7.7 K/uL 3.8   Immature Granulocytes Latest Ref Range: 0.0 - 0.5 % 0.7 (H)   Immature Grans (Abs) Latest Ref Range: 0.00 - 0.04 K/uL 0.05 (H)   Lymph% Latest Ref Range: 18.0 - 48.0 % 20.9   Lymph # Latest Ref Range: 1.0 - 4.8 K/uL 1.4   Mono% Latest Ref Range: 4.0 - 15.0 % 21.8 (H)   Mono # Latest Ref Range: 0.3 - 1.0 K/uL 1.5 (H)   Eosinophil% Latest Ref Range: 0.0 - 8.0 % 0.1    Eos # Latest Ref Range: 0.0 - 0.5 K/uL 0.0   Basophil% Latest Ref Range: 0.0 - 1.9 % 0.6   Baso # Latest Ref Range: 0.00 - 0.20 K/uL 0.04   nRBC Latest Ref Range: 0 /100 WBC 0     CMP:   Ref. Range 11/21/2017 05:02   Sodium Latest Ref Range: 136 - 145 mmol/L 132 (L)   Potassium Latest Ref Range: 3.5 - 5.1 mmol/L 3.4 (L)   Chloride Latest Ref Range: 95 - 110 mmol/L 98   CO2 Latest Ref Range: 23 - 29 mmol/L 23   Anion Gap Latest Ref Range: 8 - 16 mmol/L 11   BUN, Bld Latest Ref Range: 6 - 20 mg/dL 24 (H)   Creatinine Latest Ref Range: 0.5 - 1.4 mg/dL 4.0 (H)   eGFR if non African American Latest Ref Range: >60 mL/min/1.73 m^2 19.1 (A)   eGFR if African American Latest Ref Range: >60 mL/min/1.73 m^2 22.1 (A)   Glucose Latest Ref Range: 70 - 110 mg/dL 81   Calcium Latest Ref Range: 8.7 - 10.5 mg/dL 8.4 (L)   Phosphorus Latest Ref Range: 2.7 - 4.5 mg/dL 2.5 (L)   Magnesium Latest Ref Range: 1.6 - 2.6 mg/dL 1.5 (L)   Alkaline Phosphatase Latest Ref Range: 55 - 135 U/L 127   Total Protein Latest Ref Range: 6.0 - 8.4 g/dL 4.6 (L)   Albumin Latest Ref Range: 3.5 - 5.2 g/dL 2.1 (L)   Total Bilirubin Latest Ref Range: 0.1 - 1.0 mg/dL 2.3 (H)   AST Latest Ref Range: 10 - 40 U/L 9 (L)   ALT Latest Ref Range: 10 - 44 U/L <5 (L)   Cyclosporine, LC/MS Latest Ref Range: 100 - 400 ng/mL 283       The patients clinical status was discussed at multidisplinary rounds, involving transplant surgery, transplant medicine, pharmacy, nursing, nutrition, and social work    Discharged Condition: fair    Disposition: Home or Self Care      Patient Instructions:     Referral to Home health   Referral Priority: Routine Referral Type: Home Health Care   Referral Reason: Specialty Services Required    Requested Specialty: Home Health Services    Number of Visits Requested: 1      Diet general     Lifting restrictions   Order Comments: Do not lift greater than 10 lbs for 6 weeks from time of transplant     Weight bearing restrictions (specify)    Order Comments: Do not lift greater than 10 lbs for 6 weeks from time of transplant     Call MD for:  temperature >100.4     Call MD for:  persistent nausea and vomiting or diarrhea     Call MD for:  severe uncontrolled pain     Call MD for:  redness, tenderness, or signs of infection (pain, swelling, redness, odor or green/yellow discharge around incision site)     Call MD for:  difficulty breathing or increased cough     Call MD for:  severe persistent headache     Call MD for:  worsening rash     Call MD for:  persistent dizziness, light-headedness, or visual disturbances     Call MD for:  increased confusion or weakness     Call MD for:   Order Comments: For any other concerning signs or symptoms       Medications:  Reconciled Home Medications:   Discharge Medication List as of 11/21/2017  4:06 PM      START taking these medications    Details   amoxicillin-clavulanate 500-125mg (AUGMENTIN) 500-125 mg Tab Take 1 tablet (500 mg total) by mouth 2 (two) times daily. Stop on 11/28/2017, Starting Sat 11/18/2017, Until Tue 11/28/2017, Normal      !! cycloSPORINE modified, NEORAL, 25 MG capsule Take 2 capsules (50 mg total) by mouth 2 (two) times daily., Starting Tue 11/21/2017, Normal      furosemide (LASIX) 40 MG tablet Take 3 tablets (120 mg total) by mouth 2 (two) times daily., Starting Tue 11/21/2017, Normal      metOLazone (ZAROXOLYN) 5 MG tablet Take 1 tablet (5 mg total) by mouth 3 (three) times a week. Take on Mon, Wed, Fri 30 minutes before furosemide, Starting Wed 11/22/2017, Normal      valGANciclovir (VALCYTE) 450 mg Tab Take 1 tablet (450 mg total) by mouth every Mon, Wed, Fri. Stop on 1/17/18, Starting Wed 11/22/2017, Normal       !! - Potential duplicate medications found. Please discuss with provider.      CONTINUE these medications which have CHANGED    Details   mycophenolate (CELLCEPT) 250 mg Cap Take 2 capsules (500 mg total) by mouth 2 (two) times daily., Starting Tue 11/21/2017, Normal          CONTINUE these medications which have NOT CHANGED    Details   aspirin (ECOTRIN) 81 MG EC tablet Take 1 tablet (81 mg total) by mouth once daily., Starting Wed 11/8/2017, OTC      atovaquone (MEPRON) 750 mg/5 mL Susp Take 10 mLs (1,500 mg total) by mouth once daily. Stop on 4/17/18, Starting Thu 10/19/2017, Until Tue 4/17/2018, Normal      !! cycloSPORINE modified, NEORAL, (NEORAL) 100 MG capsule Take 2 capsules (200 mg total) by mouth 2 (two) times daily. Take with 25mg capsules for total of 250mg PO BID, Starting Wed 11/8/2017, Normal      docusate sodium (COLACE) 100 MG capsule Take 100 mg by mouth 3 (three) times daily as needed for Constipation., Historical Med      levETIRAcetam (KEPPRA) 500 MG Tab Take 1 tablet (500 mg total) by mouth 2 (two) times daily., Starting Tue 11/7/2017, Until Wed 11/7/2018, Normal      multivitamin (THERAGRAN) tablet Take 1 tablet by mouth once daily., Starting Tue 11/7/2017, Normal      ondansetron (ZOFRAN-ODT) 8 MG TbDL Take 1 tablet (8 mg total) by mouth every 8 (eight) hours as needed (nausea)., Starting Thu 11/9/2017, Normal      oxyCODONE-acetaminophen (PERCOCET)  mg per tablet Take 0.5-1 tablets by mouth every 4 (four) hours as needed for Pain., Starting Tue 11/14/2017, Normal      pantoprazole (PROTONIX) 40 MG tablet Take 1 tablet (40 mg total) by mouth once daily., Starting Wed 11/8/2017, Normal      predniSONE (DELTASONE) 10 MG tablet Take 20mg PO QD 10/29-11/4; 15mg PO QD 11/5-11/11; 10mg PO QD 11/12-11/18; 5mg PO QD 11/19-11/25; 2.5mg PO QD 11/26-12/2; stop on 12/2/17, Normal      thiamine 100 MG tablet Take 1 tablet (100 mg total) by mouth once daily., Starting Tue 11/7/2017, Normal      ergocalciferol (ERGOCALCIFEROL) 50,000 unit Cap Take 1 capsule (50,000 Units total) by mouth every 7 days., Starting Tue 11/7/2017, Normal       !! - Potential duplicate medications found. Please discuss with provider.      STOP taking these medications       fluconazole  (DIFLUCAN) 200 MG Tab Comments:   Reason for Stopping:             Time spent caring for patient (Greater than 1/2 spent in direct face-to-face contact): > 30 minutes    Citlali Bridges PAShahrzadC  Liver Transplant  [facility]

## 2017-11-21 NOTE — PT/OT/SLP EVAL
"Physical Therapy  Evaluation/  DISCHARGE    Jhonny Diana   MRN: [id number]   Admitting Diagnosis: Fever    PT Received On: 11/21/17  PT Start Time: 1427     PT Stop Time: 1450    PT Total Time (min): 23 min       Billable Minutes:  Evaluation 12 and Gait Training 11    Diagnosis: Fever      Past Medical History:   Diagnosis Date    Alcoholic hepatitis with ascites     Alcoholic hepatitis with ascites     History of hematemesis 9/28/2017    Hypertension     Renal disorder       Past Surgical History:   Procedure Laterality Date    APPENDECTOMY         Referring PA: Citlali Bridges PA-C  Date referred to PT: 11/20/17    General Precautions: Standard, fall  Orthopedic Precautions: N/A   Braces: N/A       Do you have any cultural, spiritual, Mandaen conflicts, given your current situation?: no    Patient History:  Lives With: parent(s)  Living Arrangements: house  Home Accessibility: stairs to enter home  Home Layout: Able to live on 1st floor  Number of Stairs to Enter Home: 3  Stair Railings at Home: outside, present at both sides  Transportation Available: family or friend will provide, car  Living Environment Comment: Currently residing in [facility] apartments, close to hospital for weekly check-ups  Equipment Currently Used at Home: walker, rolling, bedside commode  DME owned (not currently used): none    Previous Level of Function:  Ambulation Skills: independent  Transfer Skills: independent  ADL Skills: independent  Work/Leisure Activity: independent    Subjective:  Communicated with nursing prior to session.  "I have no butt muscles."    Chief Complaint: Difficulty with sit<>stand  Patient goals: To get rid of the wound vac    Pain/Comfort  Pain Rating 1: 7/10  Location 1: abdomen  Pain Addressed 1: Distraction      Objective:   Patient found with: wound vac (Dialysis catheter)     Cognitive Exam:  Oriented to: Person, Place, Time and Situation    Follows Commands/attention: Follows multistep  " commands  Communication: clear/fluent  Safety awareness/insight to disability: intact    Physical Exam:  Postural examination/scapula alignment: No postural abnormalities identified    Skin integrity: open surgical abdominal wound  Edema: Pitting throughout    Sensation:   Intact  light/touch B LEs    Upper Extremity Range of Motion:  Right Upper Extremity: WFL  Left Upper Extremity: WFL    Upper Extremity Strength:  Right Upper Extremity: WFL  Left Upper Extremity: WFL    Lower Extremity Range of Motion:  Right Lower Extremity: Deficits: hip flex impaired  Left Lower Extremity: Deficits: hip flex impaired    Lower Extremity Strength:  Right Lower Extremity: Deficits: 2/5 hip flex, 4+/5 kn flex/ext, 3+/5 DF  Left Lower Extremity: Deficits: 2/5 hip flex, 4+/5 kn flex/ext, 3+/5 DF     Fine motor coordination:  Intact  Left hand thumb/finger opposition skills and Right hand thumb/finger opposition skills    Gross motor coordination: WFL    Functional Mobility:  Bed Mobility:       Transfers:  Sit <> Stand Assistance: Supervision (Sitting atop 2 pillows)  Sit <> Stand Assistive Device: No Assistive Device  Bed <> Chair Technique: Stand Pivot  Bed <> Chair Assistance: Independent  Bed <> Chair Assistive Device:  (IV pole)  Toilet Transfer Technique: Stand Pivot  Toilet Transfer Assistance: Independent  Toilet Transfer Assistive Device:  (With IV pole)    Gait:   Gait Distance: Pt amb 500', no episodes of LOB, no SOB noted  Assistance 1: Independent  Gait Assistive Device:  (with IV pole)  Gait Pattern: swing-through gait  Gait Deviation(s): decreased saskia    Stairs:  Pt refused to perf    Balance:   Static Sit: NORMAL: No deviations seen in posture held statically  Dynamic Sit: NORMAL: No deviations seen in posture held dynamically  Static Stand: NORMAL: No deviations seen in posture held statically  Dynamic stand: GOOD+: Independent gait (with or without assistive device)    Therapeutic Activities and  "Exercises:  Edema measurements - diameter reading  2" below tibial tuberosity: R: 16.25", L 16"  2" above lateral malleoli: R 10.5", L 10"    AM-PAC 6 CLICK MOBILITY  How much help from another person does this patient currently need?   1 = Unable, Total/Dependent Assistance  2 = A lot, Maximum/Moderate Assistance  3 = A little, Minimum/Contact Guard/Supervision  4 = None, Modified [place]/Independent    Turning over in bed (including adjusting bedclothes, sheets and blankets)?: 3  Sitting down on and standing up from a chair with arms (e.g., wheelchair, bedside commode, etc.): 3  Moving from lying on back to sitting on the side of the bed?: 3  Moving to and from a bed to a chair (including a wheelchair)?: 4  Need to walk in hospital room?: 4  Climbing 3-5 steps with a railing?: 3  Total Score: 20     AM-PAC Raw Score CMS G-Code Modifier Level of Impairment Assistance   6 % Total / Unable   7 - 9 CM 80 - 100% Maximal Assist   10 - 14 CL 60 - 80% Moderate Assist   15 - 19 CK 40 - 60% Moderate Assist   20 - 22 CJ 20 - 40% Minimal Assist   23 CI 1-20% SBA / CGA   24 CH 0% Independent/ Mod I     Patient left up in chair with all lines intact, call button in reach, PA and nurse notified and mother present.    Assessment:   Jhonny Diana is a 27 y.o. male with a medical diagnosis of Fever.  Pt is to be discharged at this time as pt is considered to be medically stable.  Recommendation for pt to receive skilled PT services in the home setting, at the [facility], to address deficits in sit<>stand transfers, bed mobility, and LE mm strength.  Pt has significant edema throughout the LEs, which may also be addressed with PT services.      Rehab identified problem list/impairments: Rehab identified problem list/impairments: weakness, impaired endurance, pain, decreased lower extremity function, edema, impaired functional mobilty    Rehab potential is fair.    Activity tolerance: Fair    Discharge " recommendations: Discharge Facility/Level Of Care Needs: home health PT     Barriers to discharge: Barriers to Discharge: None    Equipment recommendations: Equipment Needed After Discharge: none     GOALS:    Physical Therapy Goals     Not on file                PLAN:    Patient to be seen   to address the above listed problems via    Plan of Care expires:    Plan of Care reviewed with: patient, mother          Tory Lima, PT  11/21/2017

## 2017-11-21 NOTE — SUBJECTIVE & OBJECTIVE
Interval History:   No acute events overnight, Delta creatinine change minimal with a rise to 4.0 from 3.9, other electrolytes stable.  He voices no complaints this morning, oxygenating well on RA.  UOP recorded at 1.5L yesterday with 1 unmeasured occurrence on lasix 120 IV BID.  Primary team planning on discharge today.    Review of patient's allergies indicates:   Allergen Reactions    Bactrim [sulfamethoxazole-trimethoprim] Other (See Comments)     Mookie Trell Syndrome     Current Facility-Administered Medications   Medication Frequency    0.9%  NaCl infusion (for blood administration) Q24H PRN    0.9%  NaCl infusion PRN    acetaminophen tablet 650 mg Q8H PRN    amoxicillin-clavulanate 500-125mg per tablet 500 mg BID    aspirin EC tablet 81 mg Daily    atovaquone suspension 1,500 mg Daily    cycloSPORINE modified capsule 150 mg BID    docusate sodium capsule 100 mg TID    ergocalciferol capsule 50,000 Units Q7 Days    fluconazole tablet 200 mg Daily    furosemide injection 120 mg BID    glycerin 99.5% topical solution 100 mL Once    And    magnesium citrate solution 300 mL Once    And    sodium chloride 0.9% bolus 500 mL Once    heparin (porcine) injection 1,000 Units PRN    heparin (porcine) injection 5,000 Units Q8H    levETIRAcetam tablet 500 mg BID    magnesium sulfate 2g in water 50mL IVPB (premix) Once    multivitamin tablet 1 tablet Daily    mycophenolate capsule 500 mg BID    ondansetron disintegrating tablet 8 mg Q8H PRN    oxyCODONE-acetaminophen  mg per tablet 1 tablet Q4H PRN    pantoprazole EC tablet 40 mg Daily    potassium chloride SA CR tablet 20 mEq BID    predniSONE tablet 5 mg Daily    ramelteon tablet 8 mg Nightly PRN    sodium chloride 0.9% flush 3 mL PRN    thiamine tablet 100 mg Daily    valGANciclovir tablet 450 mg Every Mon, Wed, Fri       Objective:     Vital Signs (Most Recent):  Temp: 98.9 °F (37.2 °C) (11/21/17 0730)  Pulse: 100 (11/21/17  0730)  Resp: 18 (11/21/17 0730)  BP: 118/60 (11/21/17 0730)  SpO2: 98 % (11/21/17 0730)  O2 Device (Oxygen Therapy): room air (11/21/17 0730) Vital Signs (24h Range):  Temp:  [98.4 °F (36.9 °C)-99.8 °F (37.7 °C)] 98.9 °F (37.2 °C)  Pulse:  [] 100  Resp:  [18] 18  SpO2:  [97 %-100 %] 98 %  BP: (111-135)/(59-72) 118/60     Weight: 85 kg (187 lb 6.3 oz) (11/21/17 0600)  Body mass index is 29.35 kg/m².  Body surface area is 2 meters squared.    I/O last 3 completed shifts:  In: 1730 [P.O.:1630; Blood:100]  Out: 3120 [Urine:2670; Other:450]    Physical Exam   Constitutional: He is oriented to person, place, and time. He appears well-developed and well-nourished.   HENT:   Head: Atraumatic.   Eyes: EOM are normal. Scleral icterus is present.   Neck: Neck supple.   Pulmonary/Chest: Effort normal and breath sounds normal.   RLL diminished otherwise clear   Abdominal: Soft. Bowel sounds are normal. He exhibits distension. There is tenderness.   Wound vac intact   Musculoskeletal: Normal range of motion. He exhibits edema (3+ pitting ).   Neurological: He is alert and oriented to person, place, and time.   Skin: Skin is warm and dry.   Psychiatric: He has a normal mood and affect.       Significant Labs:  CBC:   Recent Labs  Lab 11/21/17  0502   WBC 6.83   RBC 2.61*   HGB 8.0*   HCT 23.7*      MCV 91   MCH 30.7   MCHC 33.8     CMP:   Recent Labs  Lab 11/21/17  0502   GLU 81   CALCIUM 8.4*   ALBUMIN 2.1*   PROT 4.6*   *   K 3.4*   CO2 23   CL 98   BUN 24*   CREATININE 4.0*   ALKPHOS 127   ALT <5*   AST 9*   BILITOT 2.3*

## 2017-11-21 NOTE — PROGRESS NOTES
[facility]  Nephrology  Progress Note    Patient Name: Jhonny Diana  MRN: [id number]  Admission Date: 11/17/2017  Hospital Length of Stay: 4 days  Attending Provider: Jw Valdivia MD   Primary Care Physician: Primary Doctor No  Principal Problem:Fever    Subjective:     HPI: 28 yo male with significant history for asthma, hypertension, alcoholic hepatitis/ESLD sp transplant 10/19 then discharged on 11/8/17, and LAURA secondary to hypotension/ischemic and toxic ATN now dialysis dependent who is presented to hospital for fever 100.9 this 5 am associated with generalized body aches and lower abdominal pain x 2 days. Low grade temp 99.8 earlier yesterday. Denies chills, headaches, dizziness, change in vision, chest pain, shortness of breath, palpitations, diaphoresis, orthopnea, PND, nausea, vomiting, or focal extremities weakness/numbness. EKG . CXR right pleural fluid decrease from previous. Abd x ray question ileus. Temp 99.3. Hgb 7.6. No leukocytosis. Lytes K 3.3, Mg 1.4 other wise stable. Acid base stable. Lactate 0.8. Influenza AB negative.  Blood cultures pending. Vancomycin IV given in ED.    Nephrology consult for hemodialysis. HD [facility] MWF 3.5 hrs via R IJ TDC at [facility]. Last HD 11/5 pre wt 83 kg post wt 81.2 kg. EDW TBD. Residual function varies 150-500 ml/day with home lasix 80 mg daily. Denies any drainage from TDC site or tenderness. BUN/Cr 14/3.1.     Interval History:   No acute events overnight, Delta creatinine change minimal with a rise to 4.0 from 3.9, other electrolytes stable.  He voices no complaints this morning, oxygenating well on RA.  UOP recorded at 1.5L yesterday with 1 unmeasured occurrence on lasix 120 IV BID.  Primary team planning on discharge today.    Review of patient's allergies indicates:   Allergen Reactions    Bactrim [sulfamethoxazole-trimethoprim] Other (See Comments)     Mookie Trell Syndrome     Current Facility-Administered Medications    Medication Frequency    0.9%  NaCl infusion (for blood administration) Q24H PRN    0.9%  NaCl infusion PRN    acetaminophen tablet 650 mg Q8H PRN    amoxicillin-clavulanate 500-125mg per tablet 500 mg BID    aspirin EC tablet 81 mg Daily    atovaquone suspension 1,500 mg Daily    cycloSPORINE modified capsule 150 mg BID    docusate sodium capsule 100 mg TID    ergocalciferol capsule 50,000 Units Q7 Days    fluconazole tablet 200 mg Daily    furosemide injection 120 mg BID    glycerin 99.5% topical solution 100 mL Once    And    magnesium citrate solution 300 mL Once    And    sodium chloride 0.9% bolus 500 mL Once    heparin (porcine) injection 1,000 Units PRN    heparin (porcine) injection 5,000 Units Q8H    levETIRAcetam tablet 500 mg BID    magnesium sulfate 2g in water 50mL IVPB (premix) Once    multivitamin tablet 1 tablet Daily    mycophenolate capsule 500 mg BID    ondansetron disintegrating tablet 8 mg Q8H PRN    oxyCODONE-acetaminophen  mg per tablet 1 tablet Q4H PRN    pantoprazole EC tablet 40 mg Daily    potassium chloride SA CR tablet 20 mEq BID    predniSONE tablet 5 mg Daily    ramelteon tablet 8 mg Nightly PRN    sodium chloride 0.9% flush 3 mL PRN    thiamine tablet 100 mg Daily    valGANciclovir tablet 450 mg Every Mon, Wed, Fri       Objective:     Vital Signs (Most Recent):  Temp: 98.9 °F (37.2 °C) (11/21/17 0730)  Pulse: 100 (11/21/17 0730)  Resp: 18 (11/21/17 0730)  BP: 118/60 (11/21/17 0730)  SpO2: 98 % (11/21/17 0730)  O2 Device (Oxygen Therapy): room air (11/21/17 0730) Vital Signs (24h Range):  Temp:  [98.4 °F (36.9 °C)-99.8 °F (37.7 °C)] 98.9 °F (37.2 °C)  Pulse:  [] 100  Resp:  [18] 18  SpO2:  [97 %-100 %] 98 %  BP: (111-135)/(59-72) 118/60     Weight: 85 kg (187 lb 6.3 oz) (11/21/17 0600)  Body mass index is 29.35 kg/m².  Body surface area is 2 meters squared.    I/O last 3 completed shifts:  In: 1730 [P.O.:1630; Blood:100]  Out: 3120  [Urine:2670; Other:450]    Physical Exam   Constitutional: He is oriented to person, place, and time. He appears well-developed and well-nourished.   HENT:   Head: Atraumatic.   Eyes: EOM are normal. Scleral icterus is present.   Neck: Neck supple.   Pulmonary/Chest: Effort normal and breath sounds normal.   RLL diminished otherwise clear   Abdominal: Soft. Bowel sounds are normal. He exhibits distension. There is tenderness.   Wound vac intact   Musculoskeletal: Normal range of motion. He exhibits edema (3+ pitting ).   Neurological: He is alert and oriented to person, place, and time.   Skin: Skin is warm and dry.   Psychiatric: He has a normal mood and affect.       Significant Labs:  CBC:   Recent Labs  Lab 11/21/17  0502   WBC 6.83   RBC 2.61*   HGB 8.0*   HCT 23.7*      MCV 91   MCH 30.7   MCHC 33.8     CMP:   Recent Labs  Lab 11/21/17  0502   GLU 81   CALCIUM 8.4*   ALBUMIN 2.1*   PROT 4.6*   *   K 3.4*   CO2 23   CL 98   BUN 24*   CREATININE 4.0*   ALKPHOS 127   ALT <5*   AST 9*   BILITOT 2.3*            Assessment/Plan:     LAURA (acute kidney injury)      LAURA secondary to hypotension/ischemic and toxic ATN now dialysis dependent   -HD [facility] MWF 3.5 hrs via R IJ TDC at [facility].  Last HD treatment was Friday, but was started on lasix 120 mg IV BID with improvement in his UOP.  -UOP of 1.6L yesterday with 1 unmeasured occurrence.  SCr increased to 4.0 from 3.9, appears to be plateauing.  Primary team planning on discharge today.  Because of holiday schedule at outpatient dialysis unit, he will not be due for treatment again till Friday if needed, although it appears patient is recovering from ATN and will likely not need RRT.  -ok from renal standpoint for discharge.  Labs later this week to determine if he will need RRT on Friday as outpatient.  -recommend discharging on lasix 120 mg PO BID with Metolazone 5 mg PO TIW on MWF.            Derrick Love NP  Nephrology  [facility]  Pager:  [phone]

## 2017-11-21 NOTE — PHYSICIAN QUERY
PT Name: Jhonny Diana  MR #: [id number]     Physician Query Form - Documentation Clarification      CDS/: Estelle Quiroz               Contact information: [email or web address]     This form is a permanent document in the medical record.     Query Date: November 21, 2017    By submitting this query, we are merely seeking further clarification of documentation. Please utilize your independent clinical judgment when addressing the question(s) below.    The Medical record reflects the following:    Supporting Clinical Findings Location in Medical Record   Patient reports abdominal pain, radiating into his back specifically R side. Exam notable for edematous tissue of R flank. Also with edema and erythema of BLE.       Continue Augmentin for possible cellulitis     Delayed surgical wound healing wound erythematous, give dose of vancomycin 11/17. Started Augmentin 11/18.   Transplant PN 11/20          Transplant PN 11/20    Transplant PN 11/20                                                                                      Doctor, Please specify diagnosis or diagnoses associated with above clinical findings.    Provider Use Only      (  ) cellulitis of right flank     ( x ) cellulitis of surgical wound on right flank     (  ) cellulitis of other area please specify____________                                                                                                         [  ] Clinically undetermined

## 2017-11-21 NOTE — PROGRESS NOTES
Discharge Medication Note:    Hospital Course:  28 yo male with significant history for asthma, hypertension, alcoholic hepatitis/ESLD sp transplant 10/19 then discharged on 11/8/17. At last admission, pt had LAURA secondary to hypotension/ischemic and toxic ATN discharged on dialysis. Presents to hospital for fever 100.9 and lower abdominal pain x 2 days. Patient reports abdominal pain, radiating into his back specifically R side. Exam notable for edematous tissue of R flank. Also with edema and erythema of BLE.    Augmentin initiated for possible cellulitis on 11/18. Patient is to complete 10 day course.     Patient had increased UOP with lasix and diuril. Nephrology holding off dialysis and recommend discharging patient with lasix 120mg BID and metolazone 5mg TIW (on MWF).     Maintenance immunosuppressive therapy: cyclosporine 150mg BID, cellcept originally on hold due to infection then re-initiated at half dose (500mg BID), continue prednisone taper  Opportunisitic prophylaxis: atovaquone, diflucan and valcyte. Of note, valcyte dose increased to 450mg TIW based on increased UOP. Valcyte dose may need to be re-adjusted based on renal function outpatient.    Met with Jhonny Diana at discharge to review discharge medications and to update the blue medication card.       Jhonny Diana   Home Medication Instructions NISHI:[id number]    Printed on:11/21/17 5771   Medication Information                      amoxicillin-clavulanate 500-125mg (AUGMENTIN) 500-125 mg Tab  Take 1 tablet (500 mg total) by mouth 2 (two) times daily. Stop on 11/28/2017             aspirin (ECOTRIN) 81 MG EC tablet  Take 1 tablet (81 mg total) by mouth once daily.             atovaquone (MEPRON) 750 mg/5 mL Susp  Take 10 mLs (1,500 mg total) by mouth once daily. Stop on 4/17/18             cycloSPORINE modified, NEORAL, (NEORAL) 100 MG capsule  Take 2 capsules (200 mg total) by mouth 2 (two) times daily. Take with 25mg capsules for total of 250mg  PO BID             cycloSPORINE modified, NEORAL, 25 MG capsule  Take 2 capsules (50 mg total) by mouth 2 (two) times daily.             docusate sodium (COLACE) 100 MG capsule  Take 100 mg by mouth 3 (three) times daily as needed for Constipation.             ergocalciferol (ERGOCALCIFEROL) 50,000 unit Cap  Take 1 capsule (50,000 Units total) by mouth every 7 days.             furosemide (LASIX) 40 MG tablet  Take 3 tablets (120 mg total) by mouth 2 (two) times daily.             levETIRAcetam (KEPPRA) 500 MG Tab  Take 1 tablet (500 mg total) by mouth 2 (two) times daily.             metOLazone (ZAROXOLYN) 5 MG tablet  Take 1 tablet (5 mg total) by mouth 3 (three) times a week. Take on Mon, Wed, Fri 30 minutes before furosemide             multivitamin (THERAGRAN) tablet  Take 1 tablet by mouth once daily.             mycophenolate (CELLCEPT) 250 mg Cap  Take 2 capsules (500 mg total) by mouth 2 (two) times daily.             ondansetron (ZOFRAN-ODT) 8 MG TbDL  Take 1 tablet (8 mg total) by mouth every 8 (eight) hours as needed (nausea).             oxyCODONE-acetaminophen (PERCOCET)  mg per tablet  Take 0.5-1 tablets by mouth every 4 (four) hours as needed for Pain.             pantoprazole (PROTONIX) 40 MG tablet  Take 1 tablet (40 mg total) by mouth once daily.             predniSONE (DELTASONE) 10 MG tablet  Take 20mg PO QD 10/29-11/4; 15mg PO QD 11/5-11/11; 10mg PO QD 11/12-11/18; 5mg PO QD 11/19-11/25; 2.5mg PO QD 11/26-12/2; stop on 12/2/17             thiamine 100 MG tablet  Take 1 tablet (100 mg total) by mouth once daily.             valGANciclovir (VALCYTE) 450 mg Tab  Take 1 tablet (450 mg total) by mouth every Mon, Wed, Fri. Stop on 1/17/18                 Pt was provided with prescriptions for the following meds:  E-rx: valcyte tabs, augmentin, furosemide, metolazone  Printed rx: n/a  Phone-in or faxed rx: n/a to n/a pharmacy per pt request.    The following medications have been placed on HOLD  and should be restarted in the outpatient setting (when appropriate): decide when to re-initiate full dose cellcept.     Jhonny Diana verbalized understanding and had the opportunity to ask questions.

## 2017-11-22 ENCOUNTER — LAB VISIT (OUTPATIENT)
Dept: LAB | Facility: HOSPITAL | Age: 27
End: 2017-11-22
Attending: SURGERY
Payer: COMMERCIAL

## 2017-11-22 ENCOUNTER — TELEPHONE (OUTPATIENT)
Dept: TRANSPLANT | Facility: CLINIC | Age: 27
End: 2017-11-22

## 2017-11-22 DIAGNOSIS — Z94.4 LIVER REPLACED BY TRANSPLANT: ICD-10-CM

## 2017-11-22 LAB
ALBUMIN SERPL BCP-MCNC: 2.3 G/DL
ALP SERPL-CCNC: 144 U/L
ALT SERPL W/O P-5'-P-CCNC: <5 U/L
ANION GAP SERPL CALC-SCNC: 12 MMOL/L
AST SERPL-CCNC: 13 U/L
BACTERIA BLD CULT: NORMAL
BACTERIA BLD CULT: NORMAL
BASOPHILS # BLD AUTO: 0.03 K/UL
BASOPHILS NFR BLD: 0.4 %
BILIRUB DIRECT SERPL-MCNC: 2.1 MG/DL
BILIRUB SERPL-MCNC: 2.8 MG/DL
BUN SERPL-MCNC: 29 MG/DL
CALCIUM SERPL-MCNC: 9 MG/DL
CHLORIDE SERPL-SCNC: 95 MMOL/L
CO2 SERPL-SCNC: 22 MMOL/L
CREAT SERPL-MCNC: 4.5 MG/DL
CYCLOSPORINE BLD LC/MS/MS-MCNC: 240 NG/ML
DIFFERENTIAL METHOD: ABNORMAL
EOSINOPHIL # BLD AUTO: 0 K/UL
EOSINOPHIL NFR BLD: 0 %
ERYTHROCYTE [DISTWIDTH] IN BLOOD BY AUTOMATED COUNT: 17.9 %
EST. GFR  (AFRICAN AMERICAN): 19.1 ML/MIN/1.73 M^2
EST. GFR  (NON AFRICAN AMERICAN): 16.5 ML/MIN/1.73 M^2
GLUCOSE SERPL-MCNC: 87 MG/DL
HCT VFR BLD AUTO: 27.6 %
HGB BLD-MCNC: 9.2 G/DL
IMM GRANULOCYTES # BLD AUTO: 0.04 K/UL
IMM GRANULOCYTES NFR BLD AUTO: 0.6 %
INR PPP: 1.2
LYMPHOCYTES # BLD AUTO: 1.2 K/UL
LYMPHOCYTES NFR BLD: 17 %
MCH RBC QN AUTO: 30.5 PG
MCHC RBC AUTO-ENTMCNC: 33.3 G/DL
MCV RBC AUTO: 91 FL
MONOCYTES # BLD AUTO: 1.3 K/UL
MONOCYTES NFR BLD: 18.4 %
NEUTROPHILS # BLD AUTO: 4.5 K/UL
NEUTROPHILS NFR BLD: 63.6 %
NRBC BLD-RTO: 0 /100 WBC
PLATELET # BLD AUTO: 280 K/UL
PMV BLD AUTO: 9.6 FL
POTASSIUM SERPL-SCNC: 4.2 MMOL/L
PROT SERPL-MCNC: 5.2 G/DL
PROTHROMBIN TIME: 12.1 SEC
RBC # BLD AUTO: 3.02 M/UL
SODIUM SERPL-SCNC: 129 MMOL/L
WBC # BLD AUTO: 7.05 K/UL

## 2017-11-22 PROCEDURE — 85610 PROTHROMBIN TIME: CPT

## 2017-11-22 PROCEDURE — 80158 DRUG ASSAY CYCLOSPORINE: CPT

## 2017-11-22 PROCEDURE — 80053 COMPREHEN METABOLIC PANEL: CPT

## 2017-11-22 PROCEDURE — 85025 COMPLETE CBC W/AUTO DIFF WBC: CPT

## 2017-11-22 PROCEDURE — 82248 BILIRUBIN DIRECT: CPT

## 2017-11-22 PROCEDURE — 36415 COLL VENOUS BLD VENIPUNCTURE: CPT

## 2017-11-22 NOTE — TELEPHONE ENCOUNTER
----- Message from Nathanael Medina MD sent at 11/22/2017  1:40 PM CST -----  Results ok. No action needed

## 2017-11-24 ENCOUNTER — LAB VISIT (OUTPATIENT)
Dept: LAB | Facility: HOSPITAL | Age: 27
End: 2017-11-24
Attending: SURGERY
Payer: COMMERCIAL

## 2017-11-24 ENCOUNTER — HOSPITAL ENCOUNTER (INPATIENT)
Facility: HOSPITAL | Age: 27
LOS: 6 days | Discharge: HOME HEALTH | DRG: 862 | End: 2017-11-30
Attending: EMERGENCY MEDICINE | Admitting: TRANSPLANT SURGERY
Payer: COMMERCIAL

## 2017-11-24 ENCOUNTER — TELEPHONE (OUTPATIENT)
Dept: TRANSPLANT | Facility: CLINIC | Age: 27
End: 2017-11-24

## 2017-11-24 DIAGNOSIS — R53.81 PHYSICAL DECONDITIONING: ICD-10-CM

## 2017-11-24 DIAGNOSIS — Z29.89 PROPHYLACTIC IMMUNOTHERAPY: Chronic | ICD-10-CM

## 2017-11-24 DIAGNOSIS — N17.0 ACUTE RENAL FAILURE WITH TUBULAR NECROSIS: ICD-10-CM

## 2017-11-24 DIAGNOSIS — Z94.4 LIVER REPLACED BY TRANSPLANT: ICD-10-CM

## 2017-11-24 DIAGNOSIS — E83.42 HYPOMAGNESEMIA: ICD-10-CM

## 2017-11-24 DIAGNOSIS — R50.9 FEVER OF UNKNOWN ORIGIN: ICD-10-CM

## 2017-11-24 DIAGNOSIS — T81.89XD DELAYED SURGICAL WOUND HEALING, SUBSEQUENT ENCOUNTER: ICD-10-CM

## 2017-11-24 DIAGNOSIS — D72.829 LEUKOCYTOSIS, UNSPECIFIED TYPE: ICD-10-CM

## 2017-11-24 DIAGNOSIS — Z94.4 LIVER TRANSPLANTED: Primary | Chronic | ICD-10-CM

## 2017-11-24 DIAGNOSIS — T14.8XXD WOUND HEALING, DELAYED: ICD-10-CM

## 2017-11-24 DIAGNOSIS — D72.829 LEUKOCYTOSIS: ICD-10-CM

## 2017-11-24 DIAGNOSIS — Z79.60 LONG-TERM USE OF IMMUNOSUPPRESSANT MEDICATION: Chronic | ICD-10-CM

## 2017-11-24 DIAGNOSIS — K65.9 ABDOMINAL INFECTION: ICD-10-CM

## 2017-11-24 DIAGNOSIS — R00.0 TACHYCARDIA: ICD-10-CM

## 2017-11-24 DIAGNOSIS — D63.8 ANEMIA OF CHRONIC DISEASE: ICD-10-CM

## 2017-11-24 LAB
ALBUMIN SERPL BCP-MCNC: 2.2 G/DL
ALP SERPL-CCNC: 151 U/L
ALT SERPL W/O P-5'-P-CCNC: 5 U/L
ANION GAP SERPL CALC-SCNC: 19 MMOL/L
AST SERPL-CCNC: 12 U/L
BASOPHILS # BLD AUTO: 0.06 K/UL
BASOPHILS NFR BLD: 0.5 %
BILIRUB DIRECT SERPL-MCNC: 2.2 MG/DL
BILIRUB SERPL-MCNC: 2.7 MG/DL
BILIRUB UR QL STRIP: NEGATIVE
BUN SERPL-MCNC: 34 MG/DL
CALCIUM SERPL-MCNC: 9 MG/DL
CHLORIDE SERPL-SCNC: 91 MMOL/L
CLARITY UR REFRACT.AUTO: CLEAR
CO2 SERPL-SCNC: 16 MMOL/L
COLOR UR AUTO: YELLOW
CREAT SERPL-MCNC: 4.8 MG/DL
CYCLOSPORINE BLD LC/MS/MS-MCNC: 290 NG/ML
DIFFERENTIAL METHOD: ABNORMAL
EOSINOPHIL # BLD AUTO: 0 K/UL
EOSINOPHIL NFR BLD: 0.1 %
ERYTHROCYTE [DISTWIDTH] IN BLOOD BY AUTOMATED COUNT: 17.6 %
EST. GFR  (AFRICAN AMERICAN): 17.7 ML/MIN/1.73 M^2
EST. GFR  (NON AFRICAN AMERICAN): 15.3 ML/MIN/1.73 M^2
GLUCOSE SERPL-MCNC: 65 MG/DL
GLUCOSE UR QL STRIP: NEGATIVE
HCT VFR BLD AUTO: 34.3 %
HGB BLD-MCNC: 11.2 G/DL
HGB UR QL STRIP: NEGATIVE
IMM GRANULOCYTES # BLD AUTO: 0.12 K/UL
IMM GRANULOCYTES NFR BLD AUTO: 0.9 %
INR PPP: 1.2
KETONES UR QL STRIP: ABNORMAL
LACTATE SERPL-SCNC: 0.5 MMOL/L
LEUKOCYTE ESTERASE UR QL STRIP: NEGATIVE
LYMPHOCYTES # BLD AUTO: 1.7 K/UL
LYMPHOCYTES NFR BLD: 12.7 %
MCH RBC QN AUTO: 30.3 PG
MCHC RBC AUTO-ENTMCNC: 32.7 G/DL
MCV RBC AUTO: 93 FL
MONOCYTES # BLD AUTO: 2.3 K/UL
MONOCYTES NFR BLD: 17.6 %
NEUTROPHILS # BLD AUTO: 9.1 K/UL
NEUTROPHILS NFR BLD: 68.2 %
NITRITE UR QL STRIP: NEGATIVE
NRBC BLD-RTO: 0 /100 WBC
PH UR STRIP: 5 [PH] (ref 5–8)
PLATELET # BLD AUTO: 377 K/UL
PMV BLD AUTO: 9.2 FL
POCT GLUCOSE: 73 MG/DL (ref 70–110)
POTASSIUM SERPL-SCNC: 4.6 MMOL/L
PROT SERPL-MCNC: 5.3 G/DL
PROT UR QL STRIP: NEGATIVE
PROTHROMBIN TIME: 12.4 SEC
RBC # BLD AUTO: 3.7 M/UL
SODIUM SERPL-SCNC: 126 MMOL/L
SP GR UR STRIP: 1.01 (ref 1–1.03)
URN SPEC COLLECT METH UR: ABNORMAL
UROBILINOGEN UR STRIP-ACNC: NEGATIVE EU/DL
WBC # BLD AUTO: 13.3 K/UL

## 2017-11-24 PROCEDURE — 63600175 PHARM REV CODE 636 W HCPCS: Performed by: TRANSPLANT SURGERY

## 2017-11-24 PROCEDURE — 80053 COMPREHEN METABOLIC PANEL: CPT

## 2017-11-24 PROCEDURE — 82962 GLUCOSE BLOOD TEST: CPT

## 2017-11-24 PROCEDURE — 63600175 PHARM REV CODE 636 W HCPCS: Performed by: PEDIATRICS

## 2017-11-24 PROCEDURE — 25000003 PHARM REV CODE 250: Performed by: PEDIATRICS

## 2017-11-24 PROCEDURE — 83605 ASSAY OF LACTIC ACID: CPT

## 2017-11-24 PROCEDURE — 93005 ELECTROCARDIOGRAM TRACING: CPT

## 2017-11-24 PROCEDURE — 96375 TX/PRO/DX INJ NEW DRUG ADDON: CPT

## 2017-11-24 PROCEDURE — 36415 COLL VENOUS BLD VENIPUNCTURE: CPT

## 2017-11-24 PROCEDURE — 99285 EMERGENCY DEPT VISIT HI MDM: CPT | Mod: 25

## 2017-11-24 PROCEDURE — 93010 ELECTROCARDIOGRAM REPORT: CPT | Mod: ,,, | Performed by: INTERNAL MEDICINE

## 2017-11-24 PROCEDURE — 82248 BILIRUBIN DIRECT: CPT

## 2017-11-24 PROCEDURE — 99284 EMERGENCY DEPT VISIT MOD MDM: CPT | Mod: ,,, | Performed by: EMERGENCY MEDICINE

## 2017-11-24 PROCEDURE — 87040 BLOOD CULTURE FOR BACTERIA: CPT

## 2017-11-24 PROCEDURE — 81003 URINALYSIS AUTO W/O SCOPE: CPT

## 2017-11-24 PROCEDURE — 80158 DRUG ASSAY CYCLOSPORINE: CPT

## 2017-11-24 PROCEDURE — 25000003 PHARM REV CODE 250: Performed by: EMERGENCY MEDICINE

## 2017-11-24 PROCEDURE — 96367 TX/PROPH/DG ADDL SEQ IV INF: CPT

## 2017-11-24 PROCEDURE — 96365 THER/PROPH/DIAG IV INF INIT: CPT

## 2017-11-24 PROCEDURE — 87086 URINE CULTURE/COLONY COUNT: CPT

## 2017-11-24 PROCEDURE — 20600001 HC STEP DOWN PRIVATE ROOM

## 2017-11-24 PROCEDURE — 25000003 PHARM REV CODE 250: Performed by: STUDENT IN AN ORGANIZED HEALTH CARE EDUCATION/TRAINING PROGRAM

## 2017-11-24 PROCEDURE — 85025 COMPLETE CBC W/AUTO DIFF WBC: CPT

## 2017-11-24 PROCEDURE — 85610 PROTHROMBIN TIME: CPT

## 2017-11-24 RX ORDER — METOCLOPRAMIDE 10 MG/1
10 TABLET ORAL EVERY 6 HOURS PRN
Status: DISCONTINUED | OUTPATIENT
Start: 2017-11-24 | End: 2017-11-30

## 2017-11-24 RX ORDER — THIAMINE HCL 100 MG
100 TABLET ORAL DAILY
Status: DISCONTINUED | OUTPATIENT
Start: 2017-11-25 | End: 2017-11-30 | Stop reason: HOSPADM

## 2017-11-24 RX ORDER — HYDROMORPHONE HYDROCHLORIDE 1 MG/ML
1 INJECTION, SOLUTION INTRAMUSCULAR; INTRAVENOUS; SUBCUTANEOUS EVERY 4 HOURS PRN
Status: DISCONTINUED | OUTPATIENT
Start: 2017-11-24 | End: 2017-11-24

## 2017-11-24 RX ORDER — ASPIRIN 81 MG/1
81 TABLET ORAL DAILY
Status: DISCONTINUED | OUTPATIENT
Start: 2017-11-25 | End: 2017-11-25

## 2017-11-24 RX ORDER — CEFEPIME HYDROCHLORIDE 2 G/50ML
2 INJECTION, SOLUTION INTRAVENOUS EVERY 24 HOURS
Status: COMPLETED | OUTPATIENT
Start: 2017-11-24 | End: 2017-11-24

## 2017-11-24 RX ORDER — DOCUSATE SODIUM 100 MG/1
100 CAPSULE, LIQUID FILLED ORAL 3 TIMES DAILY PRN
Status: DISCONTINUED | OUTPATIENT
Start: 2017-11-24 | End: 2017-11-25

## 2017-11-24 RX ORDER — ATOVAQUONE 750 MG/5ML
1500 SUSPENSION ORAL DAILY
Status: DISCONTINUED | OUTPATIENT
Start: 2017-11-25 | End: 2017-11-30 | Stop reason: HOSPADM

## 2017-11-24 RX ORDER — ONDANSETRON 2 MG/ML
4 INJECTION INTRAMUSCULAR; INTRAVENOUS EVERY 12 HOURS PRN
Status: DISCONTINUED | OUTPATIENT
Start: 2017-11-24 | End: 2017-11-30

## 2017-11-24 RX ORDER — ACETAMINOPHEN 325 MG/1
650 TABLET ORAL EVERY 8 HOURS PRN
Status: DISCONTINUED | OUTPATIENT
Start: 2017-11-24 | End: 2017-11-27

## 2017-11-24 RX ORDER — SODIUM CHLORIDE 0.9 % (FLUSH) 0.9 %
3 SYRINGE (ML) INJECTION
Status: DISCONTINUED | OUTPATIENT
Start: 2017-11-24 | End: 2017-11-30 | Stop reason: HOSPADM

## 2017-11-24 RX ORDER — LEVETIRACETAM 500 MG/1
500 TABLET ORAL 2 TIMES DAILY
Status: DISCONTINUED | OUTPATIENT
Start: 2017-11-24 | End: 2017-11-30 | Stop reason: HOSPADM

## 2017-11-24 RX ORDER — METOLAZONE 5 MG/1
5 TABLET ORAL
Status: DISCONTINUED | OUTPATIENT
Start: 2017-11-24 | End: 2017-11-25

## 2017-11-24 RX ORDER — PREDNISONE 5 MG/1
5 TABLET ORAL 2 TIMES DAILY
Status: DISCONTINUED | OUTPATIENT
Start: 2017-11-24 | End: 2017-11-26

## 2017-11-24 RX ORDER — HYDROCODONE BITARTRATE AND ACETAMINOPHEN 5; 325 MG/1; MG/1
1 TABLET ORAL EVERY 4 HOURS PRN
Status: DISCONTINUED | OUTPATIENT
Start: 2017-11-24 | End: 2017-11-27

## 2017-11-24 RX ORDER — ERGOCALCIFEROL 1.25 MG/1
50000 CAPSULE ORAL
Status: DISCONTINUED | OUTPATIENT
Start: 2017-11-25 | End: 2017-11-30 | Stop reason: HOSPADM

## 2017-11-24 RX ORDER — FUROSEMIDE 40 MG/1
120 TABLET ORAL 2 TIMES DAILY
Status: DISCONTINUED | OUTPATIENT
Start: 2017-11-24 | End: 2017-11-25

## 2017-11-24 RX ORDER — DIPHENHYDRAMINE HYDROCHLORIDE 50 MG/ML
25 INJECTION INTRAMUSCULAR; INTRAVENOUS EVERY 4 HOURS PRN
Status: DISCONTINUED | OUTPATIENT
Start: 2017-11-24 | End: 2017-11-30

## 2017-11-24 RX ORDER — HYDROMORPHONE HYDROCHLORIDE 2 MG/ML
1 INJECTION, SOLUTION INTRAMUSCULAR; INTRAVENOUS; SUBCUTANEOUS EVERY 4 HOURS PRN
Status: DISCONTINUED | OUTPATIENT
Start: 2017-11-24 | End: 2017-11-27

## 2017-11-24 RX ORDER — CEFEPIME HYDROCHLORIDE 1 G/50ML
1 INJECTION, SOLUTION INTRAVENOUS
Status: DISCONTINUED | OUTPATIENT
Start: 2017-11-25 | End: 2017-11-30 | Stop reason: HOSPADM

## 2017-11-24 RX ORDER — SODIUM CHLORIDE, SODIUM LACTATE, POTASSIUM CHLORIDE, CALCIUM CHLORIDE 600; 310; 30; 20 MG/100ML; MG/100ML; MG/100ML; MG/100ML
INJECTION, SOLUTION INTRAVENOUS CONTINUOUS
Status: DISCONTINUED | OUTPATIENT
Start: 2017-11-24 | End: 2017-11-25

## 2017-11-24 RX ORDER — PANTOPRAZOLE SODIUM 40 MG/1
40 TABLET, DELAYED RELEASE ORAL DAILY
Status: DISCONTINUED | OUTPATIENT
Start: 2017-11-25 | End: 2017-11-30 | Stop reason: HOSPADM

## 2017-11-24 RX ORDER — ZOLPIDEM TARTRATE 5 MG/1
5 TABLET ORAL NIGHTLY PRN
Status: DISCONTINUED | OUTPATIENT
Start: 2017-11-24 | End: 2017-11-30 | Stop reason: HOSPADM

## 2017-11-24 RX ORDER — MYCOPHENOLATE MOFETIL 250 MG/1
500 CAPSULE ORAL 2 TIMES DAILY
Status: DISCONTINUED | OUTPATIENT
Start: 2017-11-24 | End: 2017-11-24

## 2017-11-24 RX ORDER — VALGANCICLOVIR 450 MG/1
450 TABLET, FILM COATED ORAL
Status: DISCONTINUED | OUTPATIENT
Start: 2017-11-27 | End: 2017-11-30 | Stop reason: HOSPADM

## 2017-11-24 RX ORDER — HEPARIN SODIUM 5000 [USP'U]/ML
5000 INJECTION, SOLUTION INTRAVENOUS; SUBCUTANEOUS EVERY 8 HOURS
Status: DISCONTINUED | OUTPATIENT
Start: 2017-11-24 | End: 2017-11-25

## 2017-11-24 RX ADMIN — SODIUM CHLORIDE, SODIUM LACTATE, POTASSIUM CHLORIDE, AND CALCIUM CHLORIDE: 600; 310; 30; 20 INJECTION, SOLUTION INTRAVENOUS at 11:11

## 2017-11-24 RX ADMIN — PREDNISONE 5 MG: 5 TABLET ORAL at 09:11

## 2017-11-24 RX ADMIN — LEVETIRACETAM 500 MG: 500 TABLET, FILM COATED ORAL at 09:11

## 2017-11-24 RX ADMIN — HYDROMORPHONE HYDROCHLORIDE 1 MG: 2 INJECTION INTRAMUSCULAR; INTRAVENOUS; SUBCUTANEOUS at 09:11

## 2017-11-24 RX ADMIN — SODIUM CHLORIDE 500 ML: 0.9 INJECTION, SOLUTION INTRAVENOUS at 09:11

## 2017-11-24 RX ADMIN — ONDANSETRON 4 MG: 2 INJECTION INTRAMUSCULAR; INTRAVENOUS at 06:11

## 2017-11-24 RX ADMIN — CEFEPIME HYDROCHLORIDE 2 G: 2 INJECTION, SOLUTION INTRAVENOUS at 06:11

## 2017-11-24 RX ADMIN — VANCOMYCIN HYDROCHLORIDE 1000 MG: 1 INJECTION, POWDER, LYOPHILIZED, FOR SOLUTION INTRAVENOUS at 07:11

## 2017-11-24 RX ADMIN — CYCLOSPORINE 50 MG: 25 CAPSULE, LIQUID FILLED ORAL at 09:11

## 2017-11-24 RX ADMIN — SODIUM CHLORIDE 1000 ML: 0.9 INJECTION, SOLUTION INTRAVENOUS at 02:11

## 2017-11-24 RX ADMIN — METOLAZONE 5 MG: 5 TABLET ORAL at 09:11

## 2017-11-24 RX ADMIN — FUROSEMIDE 120 MG: 40 TABLET ORAL at 07:11

## 2017-11-24 NOTE — HOSPITAL COURSE
Interval History: No acute events overnight. Patient feeling better today with some improvement in abdominal pain. Edema also improving. IR unable to perform paracentesis yesterday as dilated bowel impeded access to fluid. Patient give mag citrate with 3 large BM yesterday evening. Therefore, will have asked IR to re attempt paracentesis today. Will continue Vanc/Cefepime for peritonitis while awaiting cell count results from tap per ID recs. Appreciate ID assistance. Creatinine continue to improve with no need for HD. Good UOP. Discussed case with Nephrology who recommends continuing to hold off on diuretics at this time and at time of discharge. Nephrology recommends discharging home with dialysis catheter until SCr improves to new baseline with weekly HD catheter care as an outpatient. Appreciate Nephrology assistance.  Cont to monitor.

## 2017-11-24 NOTE — ED NOTES
LOC: The patient is awake, alert and aware of environment with an appropriate affect, the patient is oriented x 3 and speaking appropriately.  APPEARANCE: Patient resting comfortably and in no acute distress, patient is clean and well groomed, patient's clothing is properly fastened.  SKIN: The skin is warm and dry, color consistent with ethnicity.    MUSCULOSKELETAL: Patient moving all extremities spontaneously, no deformities noted.  RESPIRATORY: Airway is open and patent, respirations are spontaneous, patient has a normal effort and rate, no accessory muscle use noted.  ABDOMEN: Soft with generalized tenderness, but more painful to right lower quad.  Abd surgical site extending across to p of abd.  Site intact with no redness/swelling to incision site.  NEUROLOGIC:  facial expression is symmetrical, patient moving all extremities spontaneously, normal sensation in all extremities when touched with a finger.  Follows all commands appropriately.

## 2017-11-24 NOTE — HPI
Mr Diana  is a 27 y.o. male with hx  ESLD secondary ETOH.  Now s/p oltx 10/19/2017 (Liver), which took place without complication.  Steroid induction, CMV +/+.  Post op complicated by LAURA, seizures, and delayed wound healing requiring wound vac to entire chevron incision. Patient was admitted to the hospital with complaint of fatigue and fever along with R sided abdominal pain, constipation. Lab work notable for leukocytosis as an outpatient, wbc 13. Patient was recently admitted for FUO on 11/17. Infectious work up at that time unrevealing except for mild cellulitis around old IRISH drain sites. He was discharged from the hospital on 11/22 with Augmentin from presumed cellulitis. Infectious work up again initiated. Blood cx prelim no growth. Urine cx pending. CT A/P obtained which showed moderate volume of perihepatic fluid, ascites, and peritoneal edema unchanged when compared to study of 11/20/17, R pleural fluid with compressive atelectasis, retained stool within colon and residual contrast from previous CT scan on 11/20. He was placed on broad spectrum antibiotics, Vanc and Cefepime.

## 2017-11-24 NOTE — SUBJECTIVE & OBJECTIVE
Past Medical History:   Diagnosis Date    Alcoholic hepatitis with ascites     Alcoholic hepatitis with ascites     History of hematemesis 9/28/2017    Hypertension     Renal disorder        Past Surgical History:   Procedure Laterality Date    APPENDECTOMY         Review of patient's allergies indicates:   Allergen Reactions    Bactrim [sulfamethoxazole-trimethoprim] Other (See Comments)     Mookie Trell Syndrome       Family History     None        Social History Main Topics    Smoking status: Former Smoker     Types: Cigarettes    Smokeless tobacco: Never Used    Alcohol use No      Comment: a fifth of liquor daily for years, cut back over last 2 months    Drug use: No    Sexual activity: Not on file         (Not in a hospital admission)    Review of Systems   Constitutional: Positive for activity change, appetite change, fatigue and fever. Negative for chills.   Respiratory: Negative for cough and shortness of breath.    Cardiovascular: Negative for chest pain and leg swelling.   Gastrointestinal: Positive for abdominal pain (right sided abdominal pain), constipation and nausea. Negative for diarrhea and rectal pain.   Genitourinary: Negative for difficulty urinating and dysuria.   Musculoskeletal: Negative.    Skin: Negative for color change and rash.   Allergic/Immunologic: Positive for immunocompromised state.   Neurological: Negative for dizziness and light-headedness.   Psychiatric/Behavioral: Negative.    All other systems reviewed and are negative.    Objective:     Vital Signs (Most Recent):  Temp: 97.7 °F (36.5 °C) (11/24/17 1544)  Pulse: 100 (11/24/17 1502)  Resp: 18 (11/24/17 1502)  BP: 119/68 (11/24/17 1502)  SpO2: 100 % (11/24/17 1502) Vital Signs (24h Range):  Temp:  [97.7 °F (36.5 °C)-98.3 °F (36.8 °C)] 97.7 °F (36.5 °C)  Pulse:  [100-109] 100  Resp:  [18-20] 18  SpO2:  [97 %-100 %] 100 %  BP: (119)/(55-68) 119/68     Weight: 80.7 kg (178 lb)  Body mass index is 28.73 kg/m².    No  intake or output data in the 24 hours ending 11/24/17 1640    Physical Exam   Constitutional: He appears distressed.   HENT:   Head: Normocephalic and atraumatic.   Eyes: EOM are normal. Pupils are equal, round, and reactive to light.   Neck: Normal range of motion.   Cardiovascular: Exam reveals no gallop and no friction rub.    No murmur (tachycardia) heard.  Pulmonary/Chest: No respiratory distress. He has no wheezes.   RR 18, with decreased air entry right lower base   Abdominal:         Bilateral pedal oedema    Laboratory:  CBC:   Recent Labs  Lab 11/24/17  0738   WBC 13.30*   RBC 3.70*   HGB 11.2*   HCT 34.3*   *   MCV 93   MCH 30.3   MCHC 32.7     BMP:   Recent Labs  Lab 11/24/17  0738   GLU 65*   *   K 4.6   CL 91*   CO2 16*   BUN 34*   CREATININE 4.8*   CALCIUM 9.0     CMP:   Recent Labs  Lab 11/24/17  0738   GLU 65*   CALCIUM 9.0   ALBUMIN 2.2*   PROT 5.3*   *   K 4.6   CO2 16*   CL 91*   BUN 34*   CREATININE 4.8*   ALKPHOS 151*   ALT 5*   AST 12   BILITOT 2.7*     Coagulation:   Recent Labs  Lab 11/24/17  0738   INR 1.2     Cardiac Markers: No results for input(s): CKMB, TROPONINT, MYOGLOBIN in the last 168 hours.  ABGs: No results for input(s): PH, PCO2, HCO3, POCSATURATED, BE in the last 168 hours.  Labs within the past 24 hours have been reviewed.    Diagnostic Results:  I have personally reviewed all pertinent imaging studies.

## 2017-11-24 NOTE — ASSESSMENT & PLAN NOTE
Mr Diana has presented with pyrexia post transplant. He is tachycardiac indication sepsis.   CT scan shows no change in collection size from previous scan. Blood and urine cultures sent.  D/W Transplant ID Dr Bergman- to start Vancomycin 1 gm OD and Cefepime 2 gm stat and followed by I gm Od.  If he shows no signs of improvement with continuing sepsis, we shall consider IR guided drainage of the abdominal collection.

## 2017-11-24 NOTE — ED NOTES
Pt with blood glucose 73mg/dl.  Pt sleeping, but arouses to verbal stimuli, awake, alert oriented.  Pt eating gummy worms candy at present.

## 2017-11-24 NOTE — ED PROVIDER NOTES
Encounter Date: 11/24/2017    SCRIBE #1 NOTE: I, Janet Madden, am scribing for, and in the presence of,  Dr. Read . I have scribed the following portions of the note - Other sections scribed: HPI, ROS, Physcial Exam.       History     Chief Complaint   Patient presents with    Fatigue     elevated wbc, sent by liver coordinator, liver xplant 1 week     Time patient was seen by the provider: 2:07 PM      The patient is a 27 y.o. male with hx of: end stage liver disease secondary to alcoholic hepatitis that presents to the ED with complaint of fatigue. Pt was told to come to ED during his routine follow up status post liver transplant 1 week ago because of his elevated white blood count. Since discharge 4 days ago, pt endorses change in baseline from chronic swelling of the lower extremities and severe abd pain.  Miralax/Colace/Dulcolax give no relief. Pt also endorses constipation. Brown bomb 4 days ago gave no relief. Pt denies fever.         The history is provided by the patient, a parent and medical records.     Review of patient's allergies indicates:   Allergen Reactions    Bactrim [sulfamethoxazole-trimethoprim] Other (See Comments)     Mookie Trell Syndrome     Past Medical History:   Diagnosis Date    Alcoholic hepatitis with ascites     Alcoholic hepatitis with ascites     History of hematemesis 9/28/2017    Hypertension     Renal disorder      Past Surgical History:   Procedure Laterality Date    APPENDECTOMY       No family history on file.  Social History   Substance Use Topics    Smoking status: Former Smoker     Types: Cigarettes    Smokeless tobacco: Never Used    Alcohol use No      Comment: a fifth of liquor daily for years, cut back over last 2 months     Review of Systems   Constitutional: Positive for fatigue. Negative for chills and fever.   HENT: Negative for sore throat.    Eyes: Negative for pain.   Respiratory: Negative for shortness of breath.    Cardiovascular: Negative  for chest pain.   Gastrointestinal: Positive for abdominal pain and constipation. Negative for nausea.   Genitourinary: Negative for dysuria.   Musculoskeletal: Negative for back pain.        (+) leg swelling    Skin: Negative for rash.   Neurological: Negative for weakness.   Psychiatric/Behavioral: Negative for suicidal ideas.       Physical Exam     Initial Vitals [11/24/17 1307]   BP Pulse Resp Temp SpO2   (!) 119/55 109 20 98.3 °F (36.8 °C) 97 %      MAP       76.33         Physical Exam    Nursing note and vitals reviewed.  Constitutional: No distress.   Pt is chronically ill appearing.    HENT:   Head: Normocephalic and atraumatic.   (+) dry MM   Eyes: Conjunctivae and EOM are normal. Scleral icterus is present.   Neck: Normal range of motion. Neck supple.   Cardiovascular: Normal rate and regular rhythm.   No murmur heard.  Pulmonary/Chest: Breath sounds normal. No respiratory distress.   Abdominal:   Abd is soft with mild diffuse tenderness with no guarding. Wound vac in place. No obvious abd wall cellulitis.   Musculoskeletal: Normal range of motion.   3+ edema to lower extremities bilaterally   Neurological: He is alert and oriented to person, place, and time.   Skin: Skin is warm and dry. No rash noted.         ED Course   Procedures  Labs Reviewed   URINALYSIS, REFLEX TO URINE CULTURE - Abnormal; Notable for the following:        Result Value    Ketones, UA Trace (*)     All other components within normal limits    Narrative:     Preferred Collection Type->Urine, Clean Catch   LACTIC ACID, PLASMA   URINALYSIS   POCT GLUCOSE   POCT GLUCOSE MONITORING CONTINUOUS             Medical Decision Making:   History:   Old Medical Records: I decided to obtain old medical records.  Old Records Summarized: other records.       <> Summary of Records: Pt with hx of end stage liver disease secondary to alcoholic hepatitis status post liver transplant October 19, 2017. Post op course delayed due to LAURA seizures. Wound  healing delayed requiring wound vac over his entire incision. He was most recently admitted from Nov 17- 21st after presenting with fever of 109 and abd pain. He had negative blood cultures, a CT adb/pelvis that only showed constipation. He had positive abd wall cellulitis and was treated with PO augmentin and vancomycin for this. He was scheduled  for a routine follow up yesterday.  Initial Assessment:   28 yo m, recent PMH as above, now referred to the ED for admission for leukocytosis detected on labs today.  Pt also reports persistent abd pain and constipation despite miralax/colace/dulcolax.  No fevers at home.  On exam, appears chronically ill, mildly tachy, MM very dry.  Some mild diffuse abd tenderness.  Wound vac noted.  No obvious abd wall erythema  Differential Diagnosis:   R/o sepsis/bacteremia/infection given that pt with recent transplant/immunosuppressed  Clinical Tests:   Lab Tests: Ordered and Reviewed  Radiological Study: Ordered and Reviewed  ED Management:  -already received full set of labs this am so will only add lactate and blood cultures  -discussed case with liver transplant and have admitted pt to their service            Scribe Attestation:   Scribe #1: I performed the above scribed service and the documentation accurately describes the services I performed. I attest to the accuracy of the note.            ED Course      Clinical Impression:   The primary encounter diagnosis was Leukocytosis, unspecified type. Diagnoses of Tachycardia, Fever of unknown origin, Liver transplanted, Long-term use of immunosuppressant medication, Prophylactic immunotherapy, Wound healing, delayed, Acute renal failure with tubular necrosis, Hypomagnesemia, and Leukocytosis were also pertinent to this visit.       I, Dr. Khadijah Read, personally performed the services described in this documentation. All medical record entries made by the scribe were at my direction and in my presence.  I have reviewed the  chart and agree that the record reflects my personal performance and is accurate and complete. Khadijah Read MD.  8:09 PM 11/27/2017                        Khadijah Read MD  11/27/17 2009

## 2017-11-24 NOTE — ED NOTES
Pt lying in stretcher resting with mother at bedside. Pt and mother aware pt has a room and will be going to 8083. VSS. Pt on continuous cardiac monitor and pulse ox with blood pressure cycling every thirty minutes. Side rails up x 2 bed locked and in low position with call light in reach. NAD. Will continue to monitor

## 2017-11-24 NOTE — ED TRIAGE NOTES
Pt reports elevated WBC and increased abd pain.  Pt states that she pain medication is not helping.  Pt states last BM 2 days ago.  Pt called the transplant coordinator and was told to come to ER for eval.

## 2017-11-24 NOTE — ASSESSMENT & PLAN NOTE
Patient has been on intermittent dialysis post Liver Tx secondary to ATN.  Nephrology would reassess him tomorrow AM for HD

## 2017-11-24 NOTE — H&P
[facility]  Liver Transplant  History & Physical    Patient Name: Jhonny Diana  MRN: [id number]  Admission Date: 2017  Code Status: Full Code  Primary Care Provider: Primary Doctor No  Post-Operative Day: 36     ORGAN:   LIVER  Disease Etiology: Acute Alcoholic Hepatitis  Donor Type:    - Brain Death  [facility] High Risk:   No  Donor CMV Status:   Donor CMV Status: Positive  Donor HBcAB:   Negative  Donor HCV Status:   Negative  Whole or Partial: Whole Liver  Biliary Anastomosis: End to End  Arterial Anatomy: Standard    Subjective:     History of Present Illness:  Mr Diana  is a 27 y.o. male with hx of [facility] Liver Transplant  secondary to end stage liver disease due to alcoholic hepatitis that presents to the ED with complaint of fatigue and pyrexia >100 along with right sided abdominal pain. . Pt was told to come to ED during his routine follow up status post liver transplant 1 week ago because of his elevated white blood count. Since discharge 4 days ago, pt endorses change in baseline from chronic swelling of the lower extremities and severe abd pain.   Pt also complains of  constipation. Brown bomb 4 days ago gave no relief.     Past Medical History:   Diagnosis Date    Alcoholic hepatitis with ascites     Alcoholic hepatitis with ascites     History of hematemesis 2017    Hypertension     Renal disorder        Past Surgical History:   Procedure Laterality Date    APPENDECTOMY         Review of patient's allergies indicates:   Allergen Reactions    Bactrim [sulfamethoxazole-trimethoprim] Other (See Comments)     Mookie Trell Syndrome       Family History     None        Social History Main Topics    Smoking status: Former Smoker     Types: Cigarettes    Smokeless tobacco: Never Used    Alcohol use No      Comment: a fifth of liquor daily for years, cut back over last 2 months    Drug use: No    Sexual activity: Not on file         (Not in a hospital  admission)    Review of Systems   Constitutional: Positive for activity change, appetite change, fatigue and fever. Negative for chills.   Respiratory: Negative for cough and shortness of breath.    Cardiovascular: Negative for chest pain and leg swelling.   Gastrointestinal: Positive for abdominal pain (right sided abdominal pain), constipation and nausea. Negative for diarrhea and rectal pain.   Genitourinary: Negative for difficulty urinating and dysuria.   Musculoskeletal: Negative.    Skin: Negative for color change and rash.   Allergic/Immunologic: Positive for immunocompromised state.   Neurological: Negative for dizziness and light-headedness.   Psychiatric/Behavioral: Negative.    All other systems reviewed and are negative.    Objective:     Vital Signs (Most Recent):  Temp: 97.7 °F (36.5 °C) (11/24/17 1544)  Pulse: 100 (11/24/17 1502)  Resp: 18 (11/24/17 1502)  BP: 119/68 (11/24/17 1502)  SpO2: 100 % (11/24/17 1502) Vital Signs (24h Range):  Temp:  [97.7 °F (36.5 °C)-98.3 °F (36.8 °C)] 97.7 °F (36.5 °C)  Pulse:  [100-109] 100  Resp:  [18-20] 18  SpO2:  [97 %-100 %] 100 %  BP: (119)/(55-68) 119/68     Weight: 80.7 kg (178 lb)  Body mass index is 28.73 kg/m².    No intake or output data in the 24 hours ending 11/24/17 1640    Physical Exam   Constitutional: He appears distressed.   HENT:   Head: Normocephalic and atraumatic.   Eyes: EOM are normal. Pupils are equal, round, and reactive to light.   Neck: Normal range of motion.   Cardiovascular: Exam reveals no gallop and no friction rub.    No murmur (tachycardia) heard.  Pulmonary/Chest: No respiratory distress. He has no wheezes.   RR 18, with decreased air entry right lower base   Abdominal:         Bilateral pedal oedema    Laboratory:  CBC:   Recent Labs  Lab 11/24/17  0738   WBC 13.30*   RBC 3.70*   HGB 11.2*   HCT 34.3*   *   MCV 93   MCH 30.3   MCHC 32.7     BMP:   Recent Labs  Lab 11/24/17  0738   GLU 65*   *   K 4.6   CL 91*   CO2 16*    BUN 34*   CREATININE 4.8*   CALCIUM 9.0     CMP:   Recent Labs  Lab 11/24/17  0738   GLU 65*   CALCIUM 9.0   ALBUMIN 2.2*   PROT 5.3*   *   K 4.6   CO2 16*   CL 91*   BUN 34*   CREATININE 4.8*   ALKPHOS 151*   ALT 5*   AST 12   BILITOT 2.7*     Coagulation:   Recent Labs  Lab 11/24/17  0738   INR 1.2     Cardiac Markers: No results for input(s): CKMB, TROPONINT, MYOGLOBIN in the last 168 hours.  ABGs: No results for input(s): PH, PCO2, HCO3, POCSATURATED, BE in the last 168 hours.  Labs within the past 24 hours have been reviewed.    Diagnostic Results:  I have personally reviewed all pertinent imaging studies.    Assessment/Plan:     * Leukocytosis    Mr Diana has presented with pyrexia post transplant. He is tachycardiac indication sepsis.   CT scan shows no change in collection size from previous scan. Blood and urine cultures sent.  D/W Transplant ID Dr Bergman- to start Vancomycin 1 gm OD and Cefepime 2 gm stat and followed by I gm Od.  If he shows no signs of improvement with continuing sepsis, we shall consider IR guided drainage of the abdominal collection.          Acute renal failure with tubular necrosis       Patient has been on intermittent dialysis post Liver Tx secondary to ATN.  Nephrology would reassess him tomorrow AM for HD          Liver transplanted      Liver Enzymes stable.  Monitor                Discharge Planning:      Blaze Carrasco MD  Liver Transplant  [facility]

## 2017-11-24 NOTE — TELEPHONE ENCOUNTER
----- Message from Alisa Jordan sent at 11/24/2017 10:07 AM CST -----  Contact: PT Mother Deborah  Call dropped with Isabel. Isabel please call Deborah back on [phone].    Thanks  Alisa

## 2017-11-24 NOTE — ED NOTES
"Pt requests "waiting a few minutes to start IV".  Pt tearful and states that he is a difficult stick and "just needs a few minutes".  "

## 2017-11-24 NOTE — TELEPHONE ENCOUNTER
----- Message from Fco Santos sent at 11/24/2017  9:12 AM CST -----  Contact: patient mother   Patient mother called for medication refill on oxyCODONE-acetaminophen (PERCOCET)  mg per tablet, also needs work letter stating when will the patient be able to return to work.        Please call [phone]      Thanks!!!

## 2017-11-24 NOTE — TELEPHONE ENCOUNTER
Pt called to report increasing abdominal pain, nausea, loss of appetite, and fatigue.  VSS, afebrile, adequate urine output.  Pt feels he has not improved since hospital discharge.  Discussed symptoms and lab results with Dr. Herrera.  MD determines that pt does not need dialysis today, ensure pt has 1.5 liter fluid restriction, and report to ER for evaluation.  Called pt to advise, spoke to pt's mother.  Instructed to bring pt to ER for evaluation.  Notified pt's home health nurse, Kalpana, pt was scheduled to have wound vac changed today.  Nurse will f/u with pt re: hospital admission vs. Wound vac change on Saturday.  Dr. Medina aware.

## 2017-11-25 PROBLEM — T14.8XXD WOUND HEALING, DELAYED: Status: ACTIVE | Noted: 2017-11-25

## 2017-11-25 LAB
ALBUMIN SERPL BCP-MCNC: 1.8 G/DL
ALP SERPL-CCNC: 123 U/L
ALT SERPL W/O P-5'-P-CCNC: <5 U/L
ANION GAP SERPL CALC-SCNC: 17 MMOL/L
APPEARANCE FLD: NORMAL
APPEARANCE FLD: NORMAL
AST SERPL-CCNC: 11 U/L
BACTERIA UR CULT: NO GROWTH
BASOPHILS # BLD AUTO: 0.03 K/UL
BASOPHILS NFR BLD: 0.3 %
BILIRUB FLD-MCNC: 1.4 MG/DL
BILIRUB SERPL-MCNC: 2.2 MG/DL
BODY FLD TYPE: NORMAL
BODY FLD TYPE: NORMAL
BODY FLUID SOURCE, BILIRUBIN: NORMAL
BUN SERPL-MCNC: 36 MG/DL
CALCIUM SERPL-MCNC: 8.2 MG/DL
CHLORIDE SERPL-SCNC: 92 MMOL/L
CO2 SERPL-SCNC: 15 MMOL/L
COLOR FLD: YELLOW
COLOR FLD: YELLOW
CREAT SERPL-MCNC: 4.9 MG/DL
CYCLOSPORINE BLD LC/MS/MS-MCNC: 253 NG/ML
DIFFERENTIAL METHOD: ABNORMAL
EOSINOPHIL # BLD AUTO: 0 K/UL
EOSINOPHIL NFR BLD: 0.1 %
ERYTHROCYTE [DISTWIDTH] IN BLOOD BY AUTOMATED COUNT: 17.3 %
EST. GFR  (AFRICAN AMERICAN): 17.3 ML/MIN/1.73 M^2
EST. GFR  (NON AFRICAN AMERICAN): 14.9 ML/MIN/1.73 M^2
GLUCOSE SERPL-MCNC: 67 MG/DL
GRAM STN SPEC: NORMAL
GRAM STN SPEC: NORMAL
HCT VFR BLD AUTO: 24.2 %
HCT VFR BLD AUTO: 24.2 %
HGB BLD-MCNC: 8.1 G/DL
HGB BLD-MCNC: 8.1 G/DL
IMM GRANULOCYTES # BLD AUTO: 0.15 K/UL
IMM GRANULOCYTES NFR BLD AUTO: 1.3 %
INR PPP: 1.2
LYMPHOCYTES # BLD AUTO: 1 K/UL
LYMPHOCYTES NFR BLD: 8.9 %
LYMPHOCYTES NFR FLD MANUAL: 13 %
LYMPHOCYTES NFR FLD MANUAL: 6 %
MAGNESIUM SERPL-MCNC: 1.6 MG/DL
MCH RBC QN AUTO: 30 PG
MCHC RBC AUTO-ENTMCNC: 33.5 G/DL
MCV RBC AUTO: 90 FL
MESOTHL CELL NFR FLD MANUAL: 3 %
MONOCYTES # BLD AUTO: 1.9 K/UL
MONOCYTES NFR BLD: 16.4 %
MONOS+MACROS NFR FLD MANUAL: 20 %
MONOS+MACROS NFR FLD MANUAL: 70 %
NEUTROPHILS # BLD AUTO: 8.3 K/UL
NEUTROPHILS NFR BLD: 73 %
NEUTROPHILS NFR FLD MANUAL: 14 %
NEUTROPHILS NFR FLD MANUAL: 74 %
NRBC BLD-RTO: 0 /100 WBC
PHOSPHATE SERPL-MCNC: 4 MG/DL
PLATELET # BLD AUTO: 322 K/UL
PMV BLD AUTO: 9.4 FL
POTASSIUM SERPL-SCNC: 4 MMOL/L
PROT SERPL-MCNC: 4.4 G/DL
PROTHROMBIN TIME: 12.5 SEC
RBC # BLD AUTO: 2.7 M/UL
SODIUM SERPL-SCNC: 124 MMOL/L
TACROLIMUS BLD-MCNC: <1.5 NG/ML
WBC # BLD AUTO: 11.33 K/UL
WBC # FLD: 4996 /CU MM
WBC # FLD: 587 /CU MM

## 2017-11-25 PROCEDURE — 63600175 PHARM REV CODE 636 W HCPCS: Performed by: PEDIATRICS

## 2017-11-25 PROCEDURE — 0W9G30Z DRAINAGE OF PERITONEAL CAVITY WITH DRAINAGE DEVICE, PERCUTANEOUS APPROACH: ICD-10-PCS | Performed by: RADIOLOGY

## 2017-11-25 PROCEDURE — 80053 COMPREHEN METABOLIC PANEL: CPT

## 2017-11-25 PROCEDURE — 99233 SBSQ HOSP IP/OBS HIGH 50: CPT | Mod: 24,,, | Performed by: PHYSICIAN ASSISTANT

## 2017-11-25 PROCEDURE — 63600175 PHARM REV CODE 636 W HCPCS: Performed by: PHYSICIAN ASSISTANT

## 2017-11-25 PROCEDURE — 84100 ASSAY OF PHOSPHORUS: CPT

## 2017-11-25 PROCEDURE — 82247 BILIRUBIN TOTAL: CPT

## 2017-11-25 PROCEDURE — 80197 ASSAY OF TACROLIMUS: CPT

## 2017-11-25 PROCEDURE — 87070 CULTURE OTHR SPECIMN AEROBIC: CPT | Mod: 59

## 2017-11-25 PROCEDURE — 85610 PROTHROMBIN TIME: CPT

## 2017-11-25 PROCEDURE — 87075 CULTR BACTERIA EXCEPT BLOOD: CPT | Mod: 59

## 2017-11-25 PROCEDURE — 89051 BODY FLUID CELL COUNT: CPT | Mod: 91

## 2017-11-25 PROCEDURE — 87205 SMEAR GRAM STAIN: CPT | Mod: 59

## 2017-11-25 PROCEDURE — 63600175 PHARM REV CODE 636 W HCPCS: Performed by: TRANSPLANT SURGERY

## 2017-11-25 PROCEDURE — 83735 ASSAY OF MAGNESIUM: CPT

## 2017-11-25 PROCEDURE — 0W9930Z DRAINAGE OF RIGHT PLEURAL CAVITY WITH DRAINAGE DEVICE, PERCUTANEOUS APPROACH: ICD-10-PCS | Performed by: RADIOLOGY

## 2017-11-25 PROCEDURE — 99233 SBSQ HOSP IP/OBS HIGH 50: CPT | Mod: ,,, | Performed by: INTERNAL MEDICINE

## 2017-11-25 PROCEDURE — 36415 COLL VENOUS BLD VENIPUNCTURE: CPT

## 2017-11-25 PROCEDURE — 85025 COMPLETE CBC W/AUTO DIFF WBC: CPT

## 2017-11-25 PROCEDURE — 80158 DRUG ASSAY CYCLOSPORINE: CPT

## 2017-11-25 PROCEDURE — 25000003 PHARM REV CODE 250: Performed by: PHYSICIAN ASSISTANT

## 2017-11-25 PROCEDURE — 87102 FUNGUS ISOLATION CULTURE: CPT

## 2017-11-25 PROCEDURE — 25000003 PHARM REV CODE 250: Performed by: PEDIATRICS

## 2017-11-25 PROCEDURE — 20600001 HC STEP DOWN PRIVATE ROOM

## 2017-11-25 PROCEDURE — P9047 ALBUMIN (HUMAN), 25%, 50ML: HCPCS | Performed by: PHYSICIAN ASSISTANT

## 2017-11-25 PROCEDURE — 87070 CULTURE OTHR SPECIMN AEROBIC: CPT

## 2017-11-25 PROCEDURE — 87205 SMEAR GRAM STAIN: CPT

## 2017-11-25 RX ORDER — ALBUMIN HUMAN 250 G/1000ML
25 SOLUTION INTRAVENOUS ONCE
Status: DISCONTINUED | OUTPATIENT
Start: 2017-11-25 | End: 2017-11-25

## 2017-11-25 RX ORDER — BISACODYL 10 MG
10 SUPPOSITORY, RECTAL RECTAL ONCE
Status: COMPLETED | OUTPATIENT
Start: 2017-11-25 | End: 2017-11-25

## 2017-11-25 RX ORDER — ALBUMIN HUMAN 250 G/1000ML
25 SOLUTION INTRAVENOUS EVERY 6 HOURS
Status: COMPLETED | OUTPATIENT
Start: 2017-11-25 | End: 2017-11-25

## 2017-11-25 RX ORDER — POLYETHYLENE GLYCOL 3350 17 G/17G
17 POWDER, FOR SOLUTION ORAL DAILY
Status: DISCONTINUED | OUTPATIENT
Start: 2017-11-25 | End: 2017-11-30 | Stop reason: HOSPADM

## 2017-11-25 RX ORDER — HEPARIN SODIUM 5000 [USP'U]/ML
5000 INJECTION, SOLUTION INTRAVENOUS; SUBCUTANEOUS EVERY 8 HOURS
Status: DISCONTINUED | OUTPATIENT
Start: 2017-11-25 | End: 2017-11-30 | Stop reason: HOSPADM

## 2017-11-25 RX ORDER — SODIUM BICARBONATE 650 MG/1
1300 TABLET ORAL 3 TIMES DAILY
Status: DISCONTINUED | OUTPATIENT
Start: 2017-11-25 | End: 2017-11-30

## 2017-11-25 RX ORDER — ALBUMIN HUMAN 250 G/1000ML
25 SOLUTION INTRAVENOUS EVERY 6 HOURS
Status: DISCONTINUED | OUTPATIENT
Start: 2017-11-25 | End: 2017-11-25

## 2017-11-25 RX ORDER — DOCUSATE SODIUM 100 MG/1
100 CAPSULE, LIQUID FILLED ORAL 3 TIMES DAILY
Status: DISCONTINUED | OUTPATIENT
Start: 2017-11-25 | End: 2017-11-30 | Stop reason: HOSPADM

## 2017-11-25 RX ADMIN — SODIUM BICARBONATE 650 MG TABLET 1300 MG: at 01:11

## 2017-11-25 RX ADMIN — PREDNISONE 5 MG: 5 TABLET ORAL at 09:11

## 2017-11-25 RX ADMIN — HYDROMORPHONE HYDROCHLORIDE 1 MG: 2 INJECTION INTRAMUSCULAR; INTRAVENOUS; SUBCUTANEOUS at 08:11

## 2017-11-25 RX ADMIN — THERA TABS 1 TABLET: TAB at 09:11

## 2017-11-25 RX ADMIN — PANTOPRAZOLE SODIUM 40 MG: 40 TABLET, DELAYED RELEASE ORAL at 09:11

## 2017-11-25 RX ADMIN — BISACODYL 10 MG: 10 SUPPOSITORY RECTAL at 03:11

## 2017-11-25 RX ADMIN — DOCUSATE SODIUM 100 MG: 100 CAPSULE, LIQUID FILLED ORAL at 01:11

## 2017-11-25 RX ADMIN — HYDROMORPHONE HYDROCHLORIDE 1 MG: 2 INJECTION INTRAMUSCULAR; INTRAVENOUS; SUBCUTANEOUS at 04:11

## 2017-11-25 RX ADMIN — ASPIRIN 81 MG: 81 TABLET, COATED ORAL at 09:11

## 2017-11-25 RX ADMIN — ALBUMIN (HUMAN) 25 G: 12.5 SOLUTION INTRAVENOUS at 03:11

## 2017-11-25 RX ADMIN — HYDROCODONE BITARTRATE AND ACETAMINOPHEN 1 TABLET: 5; 325 TABLET ORAL at 01:11

## 2017-11-25 RX ADMIN — ALBUMIN (HUMAN) 25 G: 12.5 SOLUTION INTRAVENOUS at 08:11

## 2017-11-25 RX ADMIN — ERGOCALCIFEROL 50000 UNITS: 1.25 CAPSULE ORAL at 09:11

## 2017-11-25 RX ADMIN — SODIUM BICARBONATE 650 MG TABLET 1300 MG: at 08:11

## 2017-11-25 RX ADMIN — HYDROMORPHONE HYDROCHLORIDE 1 MG: 2 INJECTION INTRAMUSCULAR; INTRAVENOUS; SUBCUTANEOUS at 10:11

## 2017-11-25 RX ADMIN — HEPARIN SODIUM 5000 UNITS: 5000 INJECTION, SOLUTION INTRAVENOUS; SUBCUTANEOUS at 04:11

## 2017-11-25 RX ADMIN — HEPARIN SODIUM 5000 UNITS: 5000 INJECTION, SOLUTION INTRAVENOUS; SUBCUTANEOUS at 08:11

## 2017-11-25 RX ADMIN — CEFEPIME HYDROCHLORIDE 1 G: 1 INJECTION, SOLUTION INTRAVENOUS at 05:11

## 2017-11-25 RX ADMIN — POLYETHYLENE GLYCOL 3350 17 G: 17 POWDER, FOR SOLUTION ORAL at 09:11

## 2017-11-25 RX ADMIN — Medication 100 MG: at 09:11

## 2017-11-25 RX ADMIN — CYCLOSPORINE 50 MG: 25 CAPSULE, LIQUID FILLED ORAL at 09:11

## 2017-11-25 RX ADMIN — DOCUSATE SODIUM 100 MG: 100 CAPSULE, LIQUID FILLED ORAL at 09:11

## 2017-11-25 RX ADMIN — LEVETIRACETAM 500 MG: 500 TABLET, FILM COATED ORAL at 08:11

## 2017-11-25 RX ADMIN — DOCUSATE SODIUM 100 MG: 100 CAPSULE, LIQUID FILLED ORAL at 08:11

## 2017-11-25 RX ADMIN — LEVETIRACETAM 500 MG: 500 TABLET, FILM COATED ORAL at 09:11

## 2017-11-25 RX ADMIN — CYCLOSPORINE 50 MG: 25 CAPSULE, LIQUID FILLED ORAL at 08:11

## 2017-11-25 RX ADMIN — ATOVAQUONE 1500 MG: 750 SUSPENSION ORAL at 11:11

## 2017-11-25 NOTE — ASSESSMENT & PLAN NOTE
Tmax 100.2. Leukocytosis improved. Blood cx prelim no growth. Urine cx pending. CT A/P obtained which showed moderate volume of perihepatic fluid, ascites, and peritoneal edema unchanged when compared to study of 11/20/17, R pleural fluid with compressive atelectasis, retained stool within colon and residual contrast from previous CT scan on 11/20. He was placed on broad spectrum antibiotics, Vanc and Cefepime, on admission. Chevron incision with wound vac in place, surrounding erythema noted to incision.   -Wound vac removed from chevron incision no evidence of purulence seen.    -Since patient with ongoing fever, tachycardia, will speak with IR about drainage of peritoneal fluid along with R thora of fluid and pleural effusion seen on CT scan as possible sources of ongoing infection Appreciate IR's assistance. Send fluid for cell count, culture, gram stain, bilirubin.   -Cont broad spectrum antibiotics.

## 2017-11-25 NOTE — NURSING
Called MD Culp to verify need for IVF - pt with +4 edema to BLE, diminished BS, PRN HD.     MD reviewed chart - maintain current IVF and add  ml bolus. POC discussed with and mother.

## 2017-11-25 NOTE — PROGRESS NOTES
Paracentesis and thoracentesis finished at this time. Pt tolerated well. 1700cc removed from right abdomen. Labs taken and sent for testing. Bandage applied to right abdomen clean, dry and intact. 700cc removed from right lung. Labs taken and sent for testing. Bandage applied to right upper back clean, dry and intact. Patient transferred back to room via stretcher under care of transport. Report called to floor nurse SISSY Campbell.

## 2017-11-25 NOTE — HPI
Mr Diana  is a 27 y.o. M Alcoholic cirrhosis s/p LTx 10/19/17, who presents with fatigue and low-grade temp () at home.   His post-op stay after liver transplant was complicated by wound infection requiring recurrent ex-laparotomies. He was currently on a 10-day course of augmentin to finish 11/28.   Post transplant, his developed acute renal failure requiring HD M/W/F until he started making some urine. Following diuretics (lasix and diuril), his urine outpiut increased and pt was discharged on lasix 120mg BID & metolazone 5mg. Last HD was 11/17/17.   Pt reports that since discharge, on 11/22, he has felt fine other than decreased energy and feeling tired. He denies chills. Had low-grade temperature.   I/O have been meticulously recorded by mother and he is has been matching I & O. Denies dysuria, urinary urgency, frequency, hematuria, flank pain.

## 2017-11-25 NOTE — ASSESSMENT & PLAN NOTE
Patient with ongoing ATN since transplant.   Last HD 11/17. Was discharged on 11/22 on lasix 120 mg BID and metolazone 5 mg MWF.   Making good urine but without renal clearance and also with significant edema. Nephrology consulted.   Plan for HD today. Hold lasix as with worsening hyponatremia.

## 2017-11-25 NOTE — ED NOTES
"Pt in bed, awake, alert, oriented, states "I need something for nausea".  Pt informed that he has Zofran ordered for nausea, and he states "ok".  "

## 2017-11-25 NOTE — ASSESSMENT & PLAN NOTE
Pt is a 22 y/o M with LTx 10/19/17 complicated by klebsiella bacteremia and abdominal wound infection who presents with leukocytosis and pyrexia. Pt discharged with sCr 4.5 and admitted with 4.9    - BUN/Cr 36/4.9 on admission suggestive of LAURA on CKD (baseline sCr 3.9 - 4.0). Likely instrinsic etiology, multifactorial secondary to sepsis vs. Toxins. Pt is on many medications which can cause renal injury, including but not limited to atovaquone, valgancyclovir, vanc, cyclosporine.   - Pt is making great UOP 1.14 L/day despite holding lasix. Agree with holding lasix in setting of sepsis  - UA: suggestive of ketones else no proteinuria, hematuria, leukocytes  - continue supportive care with fluid hydration and treat underlying etiology (abdominal infection?)   - strict I & O  - renal diet  - no indication for HD at this time; however, will keep following pt.

## 2017-11-25 NOTE — SUBJECTIVE & OBJECTIVE
Past Medical History:   Diagnosis Date    Alcoholic hepatitis with ascites     Alcoholic hepatitis with ascites     History of hematemesis 9/28/2017    Hypertension     Renal disorder        Past Surgical History:   Procedure Laterality Date    APPENDECTOMY         Review of patient's allergies indicates:   Allergen Reactions    Bactrim [sulfamethoxazole-trimethoprim] Other (See Comments)     Mookie Trell Syndrome     Current Facility-Administered Medications   Medication Frequency    acetaminophen tablet 650 mg Q8H PRN    albumin human 25% bottle 25 g Once    atovaquone suspension 1,500 mg Daily    cefepime in dextrose 5 % 1 gram/50 mL IVPB 1 g Q24H    cycloSPORINE modified (NEORAL) capsule 50 mg BID    diphenhydrAMINE injection 25 mg Q4H PRN    docusate sodium capsule 100 mg TID    ergocalciferol capsule 50,000 Units Q7 Days    heparin (porcine) injection 5,000 Units Q8H    hydrocodone-acetaminophen 5-325mg per tablet 1 tablet Q4H PRN    HYDROmorphone (PF) injection 1 mg Q4H PRN    levETIRAcetam tablet 500 mg BID    metoclopramide HCl tablet 10 mg Q6H PRN    multivitamin tablet 1 tablet Daily    ondansetron injection 4 mg Q12H PRN    pantoprazole EC tablet 40 mg Daily    polyethylene glycol packet 17 g Daily    predniSONE tablet 5 mg BID    sodium chloride 0.9% flush 3 mL PRN    thiamine tablet 100 mg Daily    [START ON 11/27/2017] valGANciclovir tablet 450 mg Every Mon, Wed, Fri    zolpidem tablet 5 mg Nightly PRN     Family History     None        Social History Main Topics    Smoking status: Former Smoker     Types: Cigarettes    Smokeless tobacco: Never Used    Alcohol use No      Comment: a fifth of liquor daily for years, cut back over last 2 months    Drug use: No    Sexual activity: Not on file     Review of Systems   Constitutional: Positive for activity change, fatigue and fever. Negative for chills.   Respiratory: Negative for cough, shortness of breath and wheezing.     Cardiovascular: Positive for leg swelling.   Gastrointestinal: Positive for abdominal pain and constipation. Negative for nausea and vomiting.   Genitourinary: Positive for scrotal swelling. Negative for difficulty urinating and dysuria.   Skin: Positive for wound.   Allergic/Immunologic: Positive for immunocompromised state.   Neurological: Positive for weakness. Negative for speech difficulty.   Psychiatric/Behavioral: Negative for confusion and decreased concentration. The patient is nervous/anxious.      Objective:     Vital Signs (Most Recent):  Temp: 99.4 °F (37.4 °C) (11/25/17 1135)  Pulse: 99 (11/25/17 1200)  Resp: 17 (11/25/17 1135)  BP: 126/70 (11/25/17 1135)  SpO2: 99 % (11/25/17 1135)  O2 Device (Oxygen Therapy): room air (11/25/17 0749) Vital Signs (24h Range):  Temp:  [97.7 °F (36.5 °C)-100.2 °F (37.9 °C)] 99.4 °F (37.4 °C)  Pulse:  [] 99  Resp:  [16-20] 17  SpO2:  [97 %-100 %] 99 %  BP: (108-126)/(55-70) 126/70     Weight: 82.1 kg (181 lb) (11/25/17 0530)  Body mass index is 29.21 kg/m².  Body surface area is 1.96 meters squared.    I/O last 3 completed shifts:  In: 2227.5 [P.O.:120; I.V.:807.5; IV Piggyback:1300]  Out: 1140 [Urine:1140]    Physical Exam   Constitutional: He is oriented to person, place, and time. He appears well-developed and well-nourished. No distress.   HENT:   Head: Normocephalic and atraumatic.   Mouth/Throat: Oropharynx is clear and moist.   Eyes: EOM are normal. Pupils are equal, round, and reactive to light.   Neck: Normal range of motion. Neck supple.   Cardiovascular: Normal rate, regular rhythm, normal heart sounds and intact distal pulses.  Exam reveals no gallop and no friction rub.    No murmur heard.  Pulmonary/Chest: Effort normal and breath sounds normal. No respiratory distress. He has no wheezes. He has no rales. He exhibits no tenderness.   Abdominal: Soft. Bowel sounds are normal. He exhibits no distension. There is no tenderness. There is no rebound and  no guarding.   Wound vac to incision with surrounding erythema.    Musculoskeletal: Normal range of motion.   Neurological: He is alert and oriented to person, place, and time.   Skin: Skin is warm and dry. He is not diaphoretic.   Psychiatric: He has a normal mood and affect. His behavior is normal. Thought content normal.   Nursing note and vitals reviewed.    Significant Labs:  All labs within the past 24 hours have been reviewed.    Significant Imaging:  All imaging within the past 24 hours have been reviewed

## 2017-11-25 NOTE — PROCEDURES
Radiology Post-Procedure Note    Pre Op Diagnosis: Effusion    Post Op Diagnosis: Same    Procedure: US guided thoracentesis.    Procedure performed by: Josh Rose MD    Written Informed Consent Obtained: Yes  Specimen Removed: YES clear yellow fluid  Estimated Blood Loss: Minimal    Findings:   Successful right thoracentesis.  No complications.    Patient tolerated procedure well.    Josh Rose M.D.  Diagnostic and Interventional Radiologist  Department of Radiology  Pager: [phone]

## 2017-11-25 NOTE — PROGRESS NOTES
[facility]  Liver Transplant  Progress Note    Patient Name: Jhonny Diana  MRN: [id number]  Admission Date: 2017  Hospital Length of Stay: 1 days  Code Status: Full Code  Primary Care Provider: Primary Doctor No  Post-Operative Day: 37    ORGAN:   LIVER  Disease Etiology: Acute Alcoholic Hepatitis  Donor Type:    - Brain Death  CDC High Risk:   No  Donor CMV Status:   Donor CMV Status: Positive  Donor HBcAB:   Negative  Donor HCV Status:   Negative  Whole or Partial: Whole Liver  Biliary Anastomosis: End to End  Arterial Anatomy: Standard  Subjective:     History of Present Illness:  Mr Diana  is a 27 y.o. male with hx  ESLD secondary ETOH.  Now s/p oltx 10/19/2017 (Liver), which took place without complication.  Steroid induction, CMV +/+.  Post op complicated by LAURA, seizures, and delayed wound healing requiring wound vac to entire chevron incision. Patient was admitted to the hospital with complaint of fatigue and fever along with R sided abdominal pain, constipation. Lab work notable for leukocytosis as an outpatient, wbc 13. Patient was recently admitted for FUO on . Infectious work up at that time unrevealing except for mild cellulitis around old IRISH drain sites. He was discharged from the hospital on  with Augmentin from presumed cellulitis. Infectious work up again initiated. Blood cx prelim no growth. Urine cx pending. CT A/P obtained which showed moderate volume of perihepatic fluid, ascites, and peritoneal edema unchanged when compared to study of 17, R pleural fluid with compressive atelectasis, retained stool within colon and residual contrast from previous CT scan on . He was placed on broad spectrum antibiotics, Vanc and Cefepime.         Hospital Course:  Interval History: No acute events overnight. Patient feeling okay, still endorsing abdominal discomfort. Mostly localized in RUQ but also radiates throughout entire abdomen. Described as achy but  also sharp, knife like pain at times. Tmax 100.2. Blood cx prelim no growth. Urine cx pending. CT A/P obtained which showed moderate volume of perihepatic fluid, ascites, and peritoneal edema unchanged when compared to study of 11/20/17, R pleural fluid with compressive atelectasis, retained stool within colon and residual contrast from previous CT scan on 11/20. He was placed on broad spectrum antibiotics, Vanc and Cefepime, on admission. Wound vac removed from chevron incision no evidence of purulence seen.  Since patient with ongoing fever, tachycardia, will speak with IR about drainage of peritoneal fluid along with R thora of fluid and pleural effusion seen on CT scan as possible sources of ongoing infection Appreciate IR's assistance. Send fluid for cell count, culture, gram stain, bilirubin. Cont broad spectrum antibiotics.     Of note, patient with ongoing ATN. Last HD 11/17. Making good urine but without renal clearance and also with significant edema. Nephrology consulted. Plan for HD today.     Scheduled Meds:   aspirin  81 mg Oral Daily    atovaquone  1,500 mg Oral Daily    ceFEPime (MAXIPIME) IVPB  1 g Intravenous Q24H    cycloSPORINE modified (NEORAL)  50 mg Oral BID    docusate sodium  100 mg Oral TID    ergocalciferol  50,000 Units Oral Q7 Days    heparin (porcine)  5,000 Units Subcutaneous Q8H    levETIRAcetam  500 mg Oral BID    metOLazone  5 mg Oral Once per day on Mon Wed Fri    multivitamin  1 tablet Oral Daily    pantoprazole  40 mg Oral Daily    polyethylene glycol  17 g Oral Daily    predniSONE  5 mg Oral BID    thiamine  100 mg Oral Daily    [START ON 11/27/2017] valGANciclovir  450 mg Oral Every Mon, Wed, Fri    vancomycin (VANCOCIN) IVPB  1,000 mg Intravenous Q24H     Continuous Infusions:   PRN Meds:acetaminophen, diphenhydrAMINE, hydrocodone-acetaminophen 5-325mg, HYDROmorphone, metoclopramide HCl, ondansetron, sodium chloride 0.9%, zolpidem    Review of Systems    Constitutional: Positive for activity change, fatigue and fever. Negative for chills.   Respiratory: Negative for cough, shortness of breath and wheezing.    Cardiovascular: Positive for leg swelling.   Gastrointestinal: Positive for abdominal pain and constipation. Negative for nausea and vomiting.   Genitourinary: Positive for scrotal swelling. Negative for difficulty urinating and dysuria.   Skin: Positive for wound.   Allergic/Immunologic: Positive for immunocompromised state.   Neurological: Positive for weakness. Negative for speech difficulty.   Psychiatric/Behavioral: Negative for confusion and decreased concentration. The patient is nervous/anxious.      Objective:     Vital Signs (Most Recent):  Temp: 99.5 °F (37.5 °C) (11/25/17 0749)  Pulse: (!) 112 (11/25/17 0749)  Resp: 18 (11/25/17 0749)  BP: (!) 108/55 (11/25/17 0749)  SpO2: 98 % (11/25/17 0749) Vital Signs (24h Range):  Temp:  [97.7 °F (36.5 °C)-100.2 °F (37.9 °C)] 99.5 °F (37.5 °C)  Pulse:  [] 112  Resp:  [16-20] 18  SpO2:  [97 %-100 %] 98 %  BP: (108-126)/(55-70) 108/55     Weight: 82.1 kg (181 lb)  Body mass index is 29.21 kg/m².    Intake/Output - Last 3 Shifts       11/23 0700 - 11/24 0659 11/24 0700 - 11/25 0659 11/25 0700 - 11/26 0659    P.O.  120     I.V. (mL/kg)  807.5 (9.8)     IV Piggyback  1300     Total Intake(mL/kg)  2227.5 (27.1)     Urine (mL/kg/hr)  1140     Stool  0     Total Output   1140      Net   +1087.5             Urine Occurrence  0 x     Stool Occurrence  0 x           Physical Exam   Constitutional: He is oriented to person, place, and time. No distress.   Cardiovascular: Regular rhythm.  Tachycardia present.    Pulmonary/Chest: Effort normal. He has decreased breath sounds in the right middle field and the right lower field. He has no wheezes.   Abdominal: Soft. There is tenderness. There is no rebound and no guarding.   Wound vac to incision with surrounding erythema.    Neurological: He is alert and oriented to  person, place, and time.   Skin: He is not diaphoretic.   Psychiatric: He has a normal mood and affect. His behavior is normal. Thought content normal.   Nursing note and vitals reviewed.      Laboratory:  Immunosuppressants         Stop Route Frequency     cycloSPORINE modified (NEORAL) capsule 50 mg      -- Oral 2 times daily        CBC:   Recent Labs  Lab 11/25/17  0507   WBC 11.33   RBC 2.70*   HGB 8.1*  8.1*   HCT 24.2*  24.2*      MCV 90   MCH 30.0   MCHC 33.5     CMP:   Recent Labs  Lab 11/25/17  0507   GLU 67*   CALCIUM 8.2*   ALBUMIN 1.8*   PROT 4.4*   *   K 4.0   CO2 15*   CL 92*   BUN 36*   CREATININE 4.9*   ALKPHOS 123   ALT <5*   AST 11   BILITOT 2.2*     Labs within the past 24 hours have been reviewed.    Diagnostic Results:   Liver Transplant Post:  No results found for this or any previous visit.    Assessment/Plan:     * Leukocytosis    Improved with abx. See FUO.           Delayed surgical wound healing    Wound vac removed, no evidence of infection, purulent drainage seen. Monitor.           Fever of unknown origin    Tmax 100.2. Leukocytosis improved. Blood cx prelim no growth. Urine cx pending. CT A/P obtained which showed moderate volume of perihepatic fluid, ascites, and peritoneal edema unchanged when compared to study of 11/20/17, R pleural fluid with compressive atelectasis, retained stool within colon and residual contrast from previous CT scan on 11/20. He was placed on broad spectrum antibiotics, Vanc and Cefepime, on admission. Chevron incision with wound vac in place, surrounding erythema noted to incision.   -Wound vac removed from chevron incision no evidence of purulence seen.    -Since patient with ongoing fever, tachycardia, will speak with IR about drainage of peritoneal fluid along with R thora of fluid and pleural effusion seen on CT scan as possible sources of ongoing infection Appreciate IR's assistance. Send fluid for cell count, culture, gram stain,  bilirubin.   -Cont broad spectrum antibiotics.             Long-term use of immunosuppressant medication    Cont cyclosporine. Cont to monitor cyclo level with daily labs, monitor for toxic side effects, and adjust for therapeutic dose.           Prophylactic immunotherapy    See long term use of immunosuppressant medication.           Liver transplanted    -LFTs stable. Cont to monitor with daily labs.           Acute renal failure with tubular necrosis    Patient with ongoing ATN since transplant.   Last HD 11/17. Was discharged on 11/22 on lasix 120 mg BID and metolazone 5 mg MWF.   Making good urine but without renal clearance and also with significant edema. Nephrology consulted.   Plan for HD today. Hold lasix as with worsening hyponatremia.                 VTE Risk Mitigation         Ordered     heparin (porcine) injection 5,000 Units  Every 8 hours     Route:  Subcutaneous        11/25/17 1037     High Risk of VTE  Once      11/24/17 1611          The patients clinical status was discussed at multidisplinary rounds, involving transplant surgery, transplant medicine, pharmacy, nursing, nutrition, and social work    Discharge Planning: Not a candidate for discharge at this time.       Citlali Bridges, PAShahrzadC  Liver Transplant  [facility]

## 2017-11-25 NOTE — ASSESSMENT & PLAN NOTE
Cont cyclosporine. Cont to monitor cyclo level with daily labs, monitor for toxic side effects, and adjust for therapeutic dose.

## 2017-11-25 NOTE — PROCEDURES
Radiology Post-Procedure Note    Pre Op Diagnosis: Ascites    Post Op Diagnosis: Same    Procedure: US guided paracentesis.    Procedure performed by: Josh Rose MD    Written Informed Consent Obtained: Yes  Specimen Removed: YES cloudy yellow fluid  Estimated Blood Loss: Minimal    Findings:   Successful RUQ paracentesis.  Albumin administered per protocol. No complications.    Patient tolerated procedure well.    Josh Rose M.D.  Diagnostic and Interventional Radiologist  Department of Radiology  Pager: [phone]

## 2017-11-25 NOTE — CONSULTS
[facility]  Nephrology  Consult Note    Patient Name: Jhonny Diana  MRN: [id number]  Admission Date: 11/24/2017  Hospital Length of Stay: 1 days  Attending Provider: Alin López Jr., MD   Primary Care Physician: Primary Doctor No  Principal Problem:Leukocytosis    Inpatient consult to Nephrology  Consult performed by: LORENZO GAMBOA  Consult ordered by: DONTRELL MORGAN        Subjective:     HPI: Mr Diana  is a 27 y.o. M Alcoholic cirrhosis s/p LTx 10/19/17, who presents with increasing abdominal pain and low-grade temp (.4) at home    His post-op stay after liver transplant (9/27 - 11/8) was complicated by LAURA requiring temporary HD, and abdominal wound infection requiring recurrent ex-laparotomies. Of note, also had some seizure-like activity for which Keppra was initiated. He was then readmitted 11/17 - 11/22 for fever and leukocytosis, which was attributed to a right abdominal wall infection/cellulitis and pt was discharged to finish a 10-day course of augmentin to finish 11/28.     Prior to transplant, he had some LAURA that was attributed to HRS vs. ATN 2/2 hypotension/sepsis. Post transplant, he also developed acute renal failure making ~ 200cc/day & requiring HD M/W/F until he started making some urine. Following diuretics (lasix and diuril), his urine outpiut increased and pt was discharged on lasix 120mg BID & metolazone 5mg. Last HD was 11/17/17.     Pt reports that since discharge, on 11/22, he did not feel well. He had increasing abdominal pain, nausea, loss of appetite, and fatigue. He also reports low-grade temperature, decreased energy and fatigue.   I/O have been meticulously recorded by mother and he is has been matching I & O. Denies dysuria, urinary urgency, frequency, hematuria, flank pain.     Past Medical History:   Diagnosis Date    Alcoholic hepatitis with ascites     Alcoholic hepatitis with ascites     History of hematemesis 9/28/2017    Hypertension      Renal disorder        Past Surgical History:   Procedure Laterality Date    APPENDECTOMY         Review of patient's allergies indicates:   Allergen Reactions    Bactrim [sulfamethoxazole-trimethoprim] Other (See Comments)     Mookie Trell Syndrome     Current Facility-Administered Medications   Medication Frequency    acetaminophen tablet 650 mg Q8H PRN    albumin human 25% bottle 25 g Once    atovaquone suspension 1,500 mg Daily    cefepime in dextrose 5 % 1 gram/50 mL IVPB 1 g Q24H    cycloSPORINE modified (NEORAL) capsule 50 mg BID    diphenhydrAMINE injection 25 mg Q4H PRN    docusate sodium capsule 100 mg TID    ergocalciferol capsule 50,000 Units Q7 Days    heparin (porcine) injection 5,000 Units Q8H    hydrocodone-acetaminophen 5-325mg per tablet 1 tablet Q4H PRN    HYDROmorphone (PF) injection 1 mg Q4H PRN    levETIRAcetam tablet 500 mg BID    metoclopramide HCl tablet 10 mg Q6H PRN    multivitamin tablet 1 tablet Daily    ondansetron injection 4 mg Q12H PRN    pantoprazole EC tablet 40 mg Daily    polyethylene glycol packet 17 g Daily    predniSONE tablet 5 mg BID    sodium chloride 0.9% flush 3 mL PRN    thiamine tablet 100 mg Daily    [START ON 11/27/2017] valGANciclovir tablet 450 mg Every Mon, Wed, Fri    zolpidem tablet 5 mg Nightly PRN     Family History     None        Social History Main Topics    Smoking status: Former Smoker     Types: Cigarettes    Smokeless tobacco: Never Used    Alcohol use No      Comment: a fifth of liquor daily for years, cut back over last 2 months    Drug use: No    Sexual activity: Not on file     Review of Systems   Constitutional: Positive for activity change, fatigue and fever. Negative for chills.   Respiratory: Negative for cough, shortness of breath and wheezing.    Cardiovascular: Positive for leg swelling.   Gastrointestinal: Positive for abdominal pain and constipation. Negative for nausea and vomiting.   Genitourinary:  Positive for scrotal swelling. Negative for difficulty urinating and dysuria.   Skin: Positive for wound.   Allergic/Immunologic: Positive for immunocompromised state.   Neurological: Positive for weakness. Negative for speech difficulty.   Psychiatric/Behavioral: Negative for confusion and decreased concentration. The patient is nervous/anxious.      Objective:     Vital Signs (Most Recent):  Temp: 99.4 °F (37.4 °C) (11/25/17 1135)  Pulse: 99 (11/25/17 1200)  Resp: 17 (11/25/17 1135)  BP: 126/70 (11/25/17 1135)  SpO2: 99 % (11/25/17 1135)  O2 Device (Oxygen Therapy): room air (11/25/17 0749) Vital Signs (24h Range):  Temp:  [97.7 °F (36.5 °C)-100.2 °F (37.9 °C)] 99.4 °F (37.4 °C)  Pulse:  [] 99  Resp:  [16-20] 17  SpO2:  [97 %-100 %] 99 %  BP: (108-126)/(55-70) 126/70     Weight: 82.1 kg (181 lb) (11/25/17 0530)  Body mass index is 29.21 kg/m².  Body surface area is 1.96 meters squared.    I/O last 3 completed shifts:  In: 2227.5 [P.O.:120; I.V.:807.5; IV Piggyback:1300]  Out: 1140 [Urine:1140]    Physical Exam   Constitutional: He is oriented to person, place, and time. He appears well-developed and well-nourished. No distress.   HENT:   Head: Normocephalic and atraumatic.   Mouth/Throat: Oropharynx is clear and moist.   Eyes: EOM are normal. Pupils are equal, round, and reactive to light.   Neck: Normal range of motion. Neck supple.   Cardiovascular: Normal rate, regular rhythm, normal heart sounds and intact distal pulses.  Exam reveals no gallop and no friction rub.    No murmur heard.  Pulmonary/Chest: Effort normal and breath sounds normal. No respiratory distress. He has no wheezes. He has no rales. He exhibits no tenderness.   Abdominal: Soft. Bowel sounds are normal. He exhibits no distension. There is no tenderness. There is no rebound and no guarding.   Wound vac to incision with surrounding erythema.    Musculoskeletal: Normal range of motion.   Neurological: He is alert and oriented to person,  place, and time.   Skin: Skin is warm and dry. He is not diaphoretic.   Psychiatric: He has a normal mood and affect. His behavior is normal. Thought content normal.   Nursing note and vitals reviewed.    Significant Labs:  All labs within the past 24 hours have been reviewed.    Significant Imaging:  All imaging within the past 24 hours have been reviewed    Assessment/Plan:     Acute renal failure with tubular necrosis    Pt is a 22 y/o M with LTx 10/19/17 complicated by klebsiella bacteremia and abdominal wound infection who presents with leukocytosis and pyrexia. Pt discharged with sCr 4.5 and admitted with 4.9    - BUN/Cr 36/4.9 on admission suggestive of LAURA on CKD (baseline sCr 3.9 - 4.0). Likely instrinsic etiology, multifactorial secondary to sepsis vs. toxins. Pt is on many medications which can cause renal injury including but not limited to atovaquone, valgancyclovir, vanc, cyclosporine.   - UA: suggestive of ketones else no proteinuria, hematuria, leukocytes  - Pt is making great UOP (1.14 L/day) despite holding lasix. Agree with holding lasix in setting of sepsis  - continue supportive care with fluid hydration and source control of possible underlying abdominal infection   - strict I & O  - renal diet  - no indication for HD at this time; however, will keep following pt.              Thank you for your consult. We will follow-up with patient.   Please contact us if you have any additional questions.    Beth Peugero MD  Nephrology  [facility]

## 2017-11-25 NOTE — H&P
Inpatient Radiology Pre-procedure Note    History of Present Illness:  Jhonny Diana is a 27 y.o. male who presents for  Image guided thoracentesis and paracentesis.  Admission H&P reviewed.  Past Medical History:   Diagnosis Date    Alcoholic hepatitis with ascites     Alcoholic hepatitis with ascites     History of hematemesis 9/28/2017    Hypertension     Renal disorder      Past Surgical History:   Procedure Laterality Date    APPENDECTOMY         Review of Systems:   As documented in primary team H&P    Home Meds:   Prior to Admission medications    Medication Sig Start Date End Date Taking? Authorizing Provider   aspirin (ECOTRIN) 81 MG EC tablet Take 1 tablet (81 mg total) by mouth once daily. 11/8/17  Yes Nathanael Medina MD   atovaquone (MEPRON) 750 mg/5 mL Susp Take 10 mLs (1,500 mg total) by mouth once daily. Stop on 4/17/18 10/19/17 4/17/18 Yes Nathanael Medina MD   cycloSPORINE modified, NEORAL, (NEORAL) 100 MG capsule Take 2 capsules (200 mg total) by mouth 2 (two) times daily. Take with 25mg capsules for total of 250mg PO BID  Patient taking differently: Take 200 mg by mouth 2 (two) times daily.  11/8/17  Yes Nathanael Medina MD   cycloSPORINE modified, NEORAL, 25 MG capsule Take 2 capsules (50 mg total) by mouth 2 (two) times daily. 11/21/17  Yes Alin López Jr., MD   docusate sodium (COLACE) 100 MG capsule Take 100 mg by mouth 3 (three) times daily as needed for Constipation.   Yes Historical Provider, MD   ergocalciferol (ERGOCALCIFEROL) 50,000 unit Cap Take 1 capsule (50,000 Units total) by mouth every 7 days. 11/7/17  Yes Nathanael Medina MD   furosemide (LASIX) 40 MG tablet Take 3 tablets (120 mg total) by mouth 2 (two) times daily. 11/21/17  Yes Alin López Jr., MD   levETIRAcetam (KEPPRA) 500 MG Tab Take 1 tablet (500 mg total) by mouth 2 (two) times daily. 11/7/17 11/7/18 Yes Nathanael Medina MD   metOLazone (ZAROXOLYN) 5 MG tablet Take 1 tablet (5 mg total) by mouth 3 (three) times a week.  Take on Mon, Wed, Fri 30 minutes before furosemide 11/22/17  Yes Alin López Jr., MD   multivitamin (THERAGRAN) tablet Take 1 tablet by mouth once daily. 11/7/17  Yes Nathanael Medina MD   mycophenolate (CELLCEPT) 250 mg Cap Take 2 capsules (500 mg total) by mouth 2 (two) times daily. 11/21/17  Yes Alin López Jr., MD   ondansetron (ZOFRAN-ODT) 8 MG TbDL Take 1 tablet (8 mg total) by mouth every 8 (eight) hours as needed (nausea). 11/9/17  Yes Nathanael Medina MD   oxyCODONE-acetaminophen (PERCOCET)  mg per tablet Take 0.5-1 tablets by mouth every 4 (four) hours as needed for Pain. 11/14/17  Yes Solis Chapa MD   pantoprazole (PROTONIX) 40 MG tablet Take 1 tablet (40 mg total) by mouth once daily. 11/8/17  Yes Nathanael Medina MD   predniSONE (DELTASONE) 10 MG tablet Take 20mg PO QD 10/29-11/4; 15mg PO QD 11/5-11/11; 10mg PO QD 11/12-11/18; 5mg PO QD 11/19-11/25; 2.5mg PO QD 11/26-12/2; stop on 12/2/17 11/8/17  Yes Nathanael Medina MD   thiamine 100 MG tablet Take 1 tablet (100 mg total) by mouth once daily. 11/7/17  Yes Nathanael Medina MD   valGANciclovir (VALCYTE) 450 mg Tab Take 1 tablet (450 mg total) by mouth every Mon, Wed, Fri. Stop on 1/17/18 11/22/17  Yes Alin López Jr., MD     Scheduled Meds:    albumin human 25%  25 g Intravenous Q6H    atovaquone  1,500 mg Oral Daily    ceFEPime (MAXIPIME) IVPB  1 g Intravenous Q24H    cycloSPORINE modified (NEORAL)  50 mg Oral BID    docusate sodium  100 mg Oral TID    ergocalciferol  50,000 Units Oral Q7 Days    heparin (porcine)  5,000 Units Subcutaneous Q8H    levETIRAcetam  500 mg Oral BID    multivitamin  1 tablet Oral Daily    pantoprazole  40 mg Oral Daily    polyethylene glycol  17 g Oral Daily    predniSONE  5 mg Oral BID    sodium bicarbonate  1,300 mg Oral TID    thiamine  100 mg Oral Daily    [START ON 11/27/2017] valGANciclovir  450 mg Oral Every Mon, Wed, Fri     Continuous Infusions:    PRN Meds:acetaminophen, diphenhydrAMINE,  hydrocodone-acetaminophen 5-325mg, HYDROmorphone, metoclopramide HCl, ondansetron, sodium chloride 0.9%, zolpidem  Anticoagulants/Antiplatelets: no anticoagulation    Allergies:   Review of patient's allergies indicates:   Allergen Reactions    Bactrim [sulfamethoxazole-trimethoprim] Other (See Comments)     Mookie Trell Syndrome     Sedation Hx: have not been any systemic reactions    Labs:    Recent Labs  Lab 11/25/17  0507   INR 1.2       Recent Labs  Lab 11/25/17  0507   WBC 11.33   HGB 8.1*  8.1*   HCT 24.2*  24.2*   MCV 90         Recent Labs  Lab 11/24/17  0738 11/25/17  0507   GLU 65* 67*   * 124*   K 4.6 4.0   CL 91* 92*   CO2 16* 15*   BUN 34* 36*   CREATININE 4.8* 4.9*   CALCIUM 9.0 8.2*   MG  --  1.6   ALT 5* <5*   AST 12 11   ALBUMIN 2.2* 1.8*   BILITOT 2.7* 2.2*   BILIDIR 2.2*  --          Vitals:  Temp: 99.4 °F (37.4 °C) (11/25/17 1135)  Pulse: 107 (11/25/17 1327)  Resp: 17 (11/25/17 1135)  BP: 126/70 (11/25/17 1135)  SpO2: 99 % (11/25/17 1135)     Physical Exam:      General: no acute distress  Mental Status: alert and oriented to person, place and time  HEENT: normocephalic, atraumatic  Chest: unlabored breathing  Heart: regular heart rate  Abdomen: nondistended  Extremity: moves all extremities    Plan:   Ultrasound guided thoracentesis and paracentesis.  Sedation Plan: Local only.      Suleman Schneider  Radiology

## 2017-11-25 NOTE — PLAN OF CARE
Problem: Patient Care Overview  Goal: Plan of Care Review  Outcome: Ongoing (interventions implemented as appropriate)  Pt aao x3. Vss. No acute distress. Mother at bedside. Wound vac removed and Dr. López at bedside to see wound. Wet to dry dressing placed. Pt to have a paracentesis and thoracentesis today. Woundvac to be replaced after IR procedures. Pt has a walker in room to assist with ambulation. Pt started on miralax, due to not having a BM since 11/22/17. Pt also started on colace TID. Pt started on bicarb and albumin due to elevated Cr=4.9. Tele reveals ST. Pt free from harm at this time. See assessment for full chart details. Will continue to monitor, assess and adjust care as needed.

## 2017-11-25 NOTE — PLAN OF CARE
Problem: Patient Care Overview  Goal: Plan of Care Review  Outcome: Ongoing (interventions implemented as appropriate)  No acute events overnight. Tachycardia improved after fluid boluses. VSS. Nephrology consulted. Will cont to monitor.     Problem: Fall Risk (Adult)  Goal: Absence of Falls  Patient will demonstrate the desired outcomes by discharge/transition of care.   Outcome: Ongoing (interventions implemented as appropriate)  Fall precautions maintained. Bed wheels locked, bed in lowest position, upper SR up x 2, call light in reach, non-skid socks when OOB. Instructed pt to call for assistance as needed. Stand-by assist, home walker in use.     Problem: Infection, Risk/Actual (Adult)  Goal: Infection Prevention/Resolution  Patient will demonstrate the desired outcomes by discharge/transition of care.   Outcome: Ongoing (interventions implemented as appropriate)  Lactate 0.5. TMax 99.8. Denies cough, chills, malaise. LR @ 50 ml/hr. Cefepime and vanc started. Pan cx pending. Strict I&O enforced. Wound vac to chevron intact.

## 2017-11-25 NOTE — SUBJECTIVE & OBJECTIVE
Scheduled Meds:   aspirin  81 mg Oral Daily    atovaquone  1,500 mg Oral Daily    ceFEPime (MAXIPIME) IVPB  1 g Intravenous Q24H    cycloSPORINE modified (NEORAL)  50 mg Oral BID    docusate sodium  100 mg Oral TID    ergocalciferol  50,000 Units Oral Q7 Days    heparin (porcine)  5,000 Units Subcutaneous Q8H    levETIRAcetam  500 mg Oral BID    metOLazone  5 mg Oral Once per day on Mon Wed Fri    multivitamin  1 tablet Oral Daily    pantoprazole  40 mg Oral Daily    polyethylene glycol  17 g Oral Daily    predniSONE  5 mg Oral BID    thiamine  100 mg Oral Daily    [START ON 11/27/2017] valGANciclovir  450 mg Oral Every Mon, Wed, Fri    vancomycin (VANCOCIN) IVPB  1,000 mg Intravenous Q24H     Continuous Infusions:   PRN Meds:acetaminophen, diphenhydrAMINE, hydrocodone-acetaminophen 5-325mg, HYDROmorphone, metoclopramide HCl, ondansetron, sodium chloride 0.9%, zolpidem    Review of Systems   Constitutional: Positive for activity change, fatigue and fever. Negative for chills.   Respiratory: Negative for cough, shortness of breath and wheezing.    Cardiovascular: Positive for leg swelling.   Gastrointestinal: Positive for abdominal pain and constipation. Negative for nausea and vomiting.   Genitourinary: Positive for scrotal swelling. Negative for difficulty urinating and dysuria.   Skin: Positive for wound.   Allergic/Immunologic: Positive for immunocompromised state.   Neurological: Positive for weakness. Negative for speech difficulty.   Psychiatric/Behavioral: Negative for confusion and decreased concentration. The patient is nervous/anxious.      Objective:     Vital Signs (Most Recent):  Temp: 99.5 °F (37.5 °C) (11/25/17 0749)  Pulse: (!) 112 (11/25/17 0749)  Resp: 18 (11/25/17 0749)  BP: (!) 108/55 (11/25/17 0749)  SpO2: 98 % (11/25/17 0749) Vital Signs (24h Range):  Temp:  [97.7 °F (36.5 °C)-100.2 °F (37.9 °C)] 99.5 °F (37.5 °C)  Pulse:  [] 112  Resp:  [16-20] 18  SpO2:  [97 %-100 %] 98  %  BP: (108-126)/(55-70) 108/55     Weight: 82.1 kg (181 lb)  Body mass index is 29.21 kg/m².    Intake/Output - Last 3 Shifts       11/23 0700 - 11/24 0659 11/24 0700 - 11/25 0659 11/25 0700 - 11/26 0659    P.O.  120     I.V. (mL/kg)  807.5 (9.8)     IV Piggyback  1300     Total Intake(mL/kg)  2227.5 (27.1)     Urine (mL/kg/hr)  1140     Stool  0     Total Output   1140      Net   +1087.5             Urine Occurrence  0 x     Stool Occurrence  0 x           Physical Exam   Constitutional: He is oriented to person, place, and time. No distress.   Cardiovascular: Regular rhythm.  Tachycardia present.    Pulmonary/Chest: Effort normal. He has decreased breath sounds in the right middle field and the right lower field. He has no wheezes.   Abdominal: Soft. There is tenderness. There is no rebound and no guarding.   Wound vac to incision with surrounding erythema.    Neurological: He is alert and oriented to person, place, and time.   Skin: He is not diaphoretic.   Psychiatric: He has a normal mood and affect. His behavior is normal. Thought content normal.   Nursing note and vitals reviewed.      Laboratory:  Immunosuppressants         Stop Route Frequency     cycloSPORINE modified (NEORAL) capsule 50 mg      -- Oral 2 times daily        CBC:   Recent Labs  Lab 11/25/17  0507   WBC 11.33   RBC 2.70*   HGB 8.1*  8.1*   HCT 24.2*  24.2*      MCV 90   MCH 30.0   MCHC 33.5     CMP:   Recent Labs  Lab 11/25/17  0507   GLU 67*   CALCIUM 8.2*   ALBUMIN 1.8*   PROT 4.4*   *   K 4.0   CO2 15*   CL 92*   BUN 36*   CREATININE 4.9*   ALKPHOS 123   ALT <5*   AST 11   BILITOT 2.2*     Labs within the past 24 hours have been reviewed.    Diagnostic Results:  US Liver Transplant Post:  No results found for this or any previous visit.

## 2017-11-26 LAB
ALBUMIN SERPL BCP-MCNC: 2.4 G/DL
ALP SERPL-CCNC: 116 U/L
ALT SERPL W/O P-5'-P-CCNC: <5 U/L
ANION GAP SERPL CALC-SCNC: 15 MMOL/L
AST SERPL-CCNC: 13 U/L
BASOPHILS # BLD AUTO: 0.02 K/UL
BASOPHILS NFR BLD: 0.3 %
BILIRUB SERPL-MCNC: 2.3 MG/DL
BUN SERPL-MCNC: 40 MG/DL
CALCIUM SERPL-MCNC: 8.4 MG/DL
CHLORIDE SERPL-SCNC: 91 MMOL/L
CO2 SERPL-SCNC: 18 MMOL/L
CREAT SERPL-MCNC: 5.4 MG/DL
CYCLOSPORINE BLD LC/MS/MS-MCNC: 158 NG/ML
DIFFERENTIAL METHOD: ABNORMAL
EOSINOPHIL # BLD AUTO: 0 K/UL
EOSINOPHIL NFR BLD: 0.1 %
ERYTHROCYTE [DISTWIDTH] IN BLOOD BY AUTOMATED COUNT: 17.2 %
EST. GFR  (AFRICAN AMERICAN): 15.3 ML/MIN/1.73 M^2
EST. GFR  (NON AFRICAN AMERICAN): 13.3 ML/MIN/1.73 M^2
GLUCOSE SERPL-MCNC: 89 MG/DL
HCT VFR BLD AUTO: 22.8 %
HGB BLD-MCNC: 7.8 G/DL
IMM GRANULOCYTES # BLD AUTO: 0.11 K/UL
IMM GRANULOCYTES NFR BLD AUTO: 1.5 %
LYMPHOCYTES # BLD AUTO: 1 K/UL
LYMPHOCYTES NFR BLD: 14.3 %
MAGNESIUM SERPL-MCNC: 1.6 MG/DL
MCH RBC QN AUTO: 30.4 PG
MCHC RBC AUTO-ENTMCNC: 34.2 G/DL
MCV RBC AUTO: 89 FL
MONOCYTES # BLD AUTO: 1.3 K/UL
MONOCYTES NFR BLD: 17.6 %
NEUTROPHILS # BLD AUTO: 4.8 K/UL
NEUTROPHILS NFR BLD: 66.2 %
NRBC BLD-RTO: 0 /100 WBC
PHOSPHATE SERPL-MCNC: 3.9 MG/DL
PLATELET # BLD AUTO: 312 K/UL
PMV BLD AUTO: 9.6 FL
POTASSIUM SERPL-SCNC: 3.7 MMOL/L
PROT SERPL-MCNC: 5.1 G/DL
RBC # BLD AUTO: 2.57 M/UL
SODIUM SERPL-SCNC: 124 MMOL/L
VANCOMYCIN SERPL-MCNC: 12.8 UG/ML
WBC # BLD AUTO: 7.27 K/UL

## 2017-11-26 PROCEDURE — 84100 ASSAY OF PHOSPHORUS: CPT

## 2017-11-26 PROCEDURE — 85025 COMPLETE CBC W/AUTO DIFF WBC: CPT

## 2017-11-26 PROCEDURE — 63600175 PHARM REV CODE 636 W HCPCS: Performed by: PEDIATRICS

## 2017-11-26 PROCEDURE — 83735 ASSAY OF MAGNESIUM: CPT

## 2017-11-26 PROCEDURE — 80158 DRUG ASSAY CYCLOSPORINE: CPT

## 2017-11-26 PROCEDURE — 99233 SBSQ HOSP IP/OBS HIGH 50: CPT | Mod: 24,,, | Performed by: PHYSICIAN ASSISTANT

## 2017-11-26 PROCEDURE — 20600001 HC STEP DOWN PRIVATE ROOM

## 2017-11-26 PROCEDURE — 63600175 PHARM REV CODE 636 W HCPCS: Performed by: TRANSPLANT SURGERY

## 2017-11-26 PROCEDURE — 25000003 PHARM REV CODE 250: Performed by: PHYSICIAN ASSISTANT

## 2017-11-26 PROCEDURE — 36415 COLL VENOUS BLD VENIPUNCTURE: CPT

## 2017-11-26 PROCEDURE — 25000003 PHARM REV CODE 250: Performed by: PEDIATRICS

## 2017-11-26 PROCEDURE — 63600175 PHARM REV CODE 636 W HCPCS: Performed by: PHYSICIAN ASSISTANT

## 2017-11-26 PROCEDURE — 80053 COMPREHEN METABOLIC PANEL: CPT

## 2017-11-26 PROCEDURE — 80202 ASSAY OF VANCOMYCIN: CPT

## 2017-11-26 RX ORDER — PREDNISONE 2.5 MG/1
2.5 TABLET ORAL DAILY
Status: DISCONTINUED | OUTPATIENT
Start: 2017-11-26 | End: 2017-11-27

## 2017-11-26 RX ORDER — PSEUDOEPHEDRINE/ACETAMINOPHEN 30MG-500MG
100 TABLET ORAL ONCE
Status: COMPLETED | OUTPATIENT
Start: 2017-11-26 | End: 2017-11-26

## 2017-11-26 RX ORDER — SYRING-NEEDL,DISP,INSUL,0.3 ML 29 G X1/2"
300 SYRINGE, EMPTY DISPOSABLE MISCELLANEOUS ONCE
Status: COMPLETED | OUTPATIENT
Start: 2017-11-26 | End: 2017-11-26

## 2017-11-26 RX ORDER — PREDNISONE 2.5 MG/1
2.5 TABLET ORAL DAILY
Status: DISCONTINUED | OUTPATIENT
Start: 2017-11-27 | End: 2017-11-26

## 2017-11-26 RX ADMIN — Medication 100 MG: at 08:11

## 2017-11-26 RX ADMIN — THERA TABS 1 TABLET: TAB at 08:11

## 2017-11-26 RX ADMIN — CYCLOSPORINE 50 MG: 25 CAPSULE, LIQUID FILLED ORAL at 08:11

## 2017-11-26 RX ADMIN — HEPARIN SODIUM 5000 UNITS: 5000 INJECTION, SOLUTION INTRAVENOUS; SUBCUTANEOUS at 09:11

## 2017-11-26 RX ADMIN — SODIUM BICARBONATE 650 MG TABLET 1300 MG: at 01:11

## 2017-11-26 RX ADMIN — HYDROCODONE BITARTRATE AND ACETAMINOPHEN 1 TABLET: 5; 325 TABLET ORAL at 11:11

## 2017-11-26 RX ADMIN — SODIUM BICARBONATE 650 MG TABLET 1300 MG: at 05:11

## 2017-11-26 RX ADMIN — PREDNISONE 2.5 MG: 2.5 TABLET ORAL at 10:11

## 2017-11-26 RX ADMIN — POLYETHYLENE GLYCOL 3350 17 G: 17 POWDER, FOR SOLUTION ORAL at 08:11

## 2017-11-26 RX ADMIN — ATOVAQUONE 1500 MG: 750 SUSPENSION ORAL at 08:11

## 2017-11-26 RX ADMIN — SODIUM BICARBONATE 650 MG TABLET 1300 MG: at 08:11

## 2017-11-26 RX ADMIN — VANCOMYCIN HYDROCHLORIDE 500 MG: 500 INJECTION, POWDER, LYOPHILIZED, FOR SOLUTION INTRAVENOUS at 01:11

## 2017-11-26 RX ADMIN — MAGNESIUM CITRATE 300 ML: 1.75 LIQUID ORAL at 01:11

## 2017-11-26 RX ADMIN — DOCUSATE SODIUM 100 MG: 100 CAPSULE, LIQUID FILLED ORAL at 01:11

## 2017-11-26 RX ADMIN — HYDROCODONE BITARTRATE AND ACETAMINOPHEN 1 TABLET: 5; 325 TABLET ORAL at 12:11

## 2017-11-26 RX ADMIN — SODIUM CHLORIDE 500 ML: 0.9 INJECTION, SOLUTION INTRAVENOUS at 01:11

## 2017-11-26 RX ADMIN — DOCUSATE SODIUM 100 MG: 100 CAPSULE, LIQUID FILLED ORAL at 09:11

## 2017-11-26 RX ADMIN — CYCLOSPORINE 75 MG: 25 CAPSULE, LIQUID FILLED ORAL at 05:11

## 2017-11-26 RX ADMIN — HYDROMORPHONE HYDROCHLORIDE 1 MG: 2 INJECTION INTRAMUSCULAR; INTRAVENOUS; SUBCUTANEOUS at 10:11

## 2017-11-26 RX ADMIN — HYDROMORPHONE HYDROCHLORIDE 1 MG: 2 INJECTION INTRAMUSCULAR; INTRAVENOUS; SUBCUTANEOUS at 12:11

## 2017-11-26 RX ADMIN — LEVETIRACETAM 500 MG: 500 TABLET, FILM COATED ORAL at 08:11

## 2017-11-26 RX ADMIN — LEVETIRACETAM 500 MG: 500 TABLET, FILM COATED ORAL at 09:11

## 2017-11-26 RX ADMIN — HYDROMORPHONE HYDROCHLORIDE 1 MG: 2 INJECTION INTRAMUSCULAR; INTRAVENOUS; SUBCUTANEOUS at 05:11

## 2017-11-26 RX ADMIN — Medication 100 ML: at 01:11

## 2017-11-26 RX ADMIN — HEPARIN SODIUM 5000 UNITS: 5000 INJECTION, SOLUTION INTRAVENOUS; SUBCUTANEOUS at 05:11

## 2017-11-26 RX ADMIN — CEFEPIME HYDROCHLORIDE 1 G: 1 INJECTION, SOLUTION INTRAVENOUS at 05:11

## 2017-11-26 RX ADMIN — DOCUSATE SODIUM 100 MG: 100 CAPSULE, LIQUID FILLED ORAL at 05:11

## 2017-11-26 RX ADMIN — PANTOPRAZOLE SODIUM 40 MG: 40 TABLET, DELAYED RELEASE ORAL at 08:11

## 2017-11-26 RX ADMIN — HEPARIN SODIUM 5000 UNITS: 5000 INJECTION, SOLUTION INTRAVENOUS; SUBCUTANEOUS at 01:11

## 2017-11-26 NOTE — PLAN OF CARE
Problem: Patient Care Overview  Goal: Plan of Care Review  Outcome: Ongoing (interventions implemented as appropriate)  Afebrile this shift; WBC 7.27 today. Vancomycin x 1 given today along with Cefepime. Patient tolerated well. Patient with ongoing complaints of abdominal pain. Being managed with PO and IV pain medications, see MAR. Chevron incision noted w/ wound vac in place. Patient given enema today for c/o constipation. Patient did have a large BM after enema was given; abdominal relief verbalized. Medications adjustment made per team. POC discussed with patient and his mother. Mother at bedside and attentive to patient. Personal items in close reach. Will continue to monitor.

## 2017-11-26 NOTE — PROGRESS NOTES
Attempted to see pt earlier this afternoon but he was not in the room. Labs and other chart reviewed. He continues to make good amount of urine but creatinine has gone up to 5.4 today. Na 124, serum bicarbonate improved from 15 to 18 with external base. Given his labs, would consider HD tomorrow tentatively for metabolic clearance only. This can be finalized after reviewing his labs in the morning.

## 2017-11-26 NOTE — ASSESSMENT & PLAN NOTE
Patient with ongoing ATN since transplant.   Last HD 11/17. Was discharged on 11/22 on lasix 120 mg BID and metolazone 5 mg MWF. Holding diuretics for hyponatremia, infection.  Making urine but without renal clearance and also with significant edema. Nephrology consulted.   Nephrology did not feel as though patient needed HD yesterday. Awaiting recs from today. Appreciate their assistance.

## 2017-11-26 NOTE — PLAN OF CARE
Problem: Patient Care Overview  Goal: Plan of Care Review  Outcome: Ongoing (interventions implemented as appropriate)  No acute events overnight. VSS. Possible HD today. Albumin x 1 this shift. Will cont to monitor.     Problem: Fall Risk (Adult)  Goal: Absence of Falls  Patient will demonstrate the desired outcomes by discharge/transition of care.   Outcome: Ongoing (interventions implemented as appropriate)  Fall precautions maintained. Bed wheels locked, bed in lowest position, upper SR up x 2, call light in reach, non-skid socks when OOB. Instructed pt to call for assistance as needed.     Problem: Infection, Risk/Actual (Adult)  Goal: Infection Prevention/Resolution  Patient will demonstrate the desired outcomes by discharge/transition of care.   Outcome: Ongoing (interventions implemented as appropriate)  Afebrile. Remains on cefepime. Awaiting testing results of thoracentesis and paracentesis samples.

## 2017-11-26 NOTE — PROGRESS NOTES
[facility]  Liver Transplant  Progress Note    Patient Name: Jhonny Diana  MRN: [id number]  Admission Date: 2017  Hospital Length of Stay: 2 days  Code Status: Full Code  Primary Care Provider: Primary Doctor No  Post-Operative Day: 38    ORGAN:   LIVER  Disease Etiology: Acute Alcoholic Hepatitis  Donor Type:    - Brain Death  CDC High Risk:   No  Donor CMV Status:   Donor CMV Status: Positive  Donor HBcAB:   Negative  Donor HCV Status:   Negative  Whole or Partial: Whole Liver  Biliary Anastomosis: End to End  Arterial Anatomy: Standard  Subjective:     History of Present Illness:  Mr Diana  is a 27 y.o. male with hx  ESLD secondary ETOH.  Now s/p oltx 10/19/2017 (Liver), which took place without complication.  Steroid induction, CMV +/+.  Post op complicated by LAURA, seizures, and delayed wound healing requiring wound vac to entire chevron incision. Patient was admitted to the hospital with complaint of fatigue and fever along with R sided abdominal pain, constipation. Lab work notable for leukocytosis as an outpatient, wbc 13. Patient was recently admitted for FUO on . Infectious work up at that time unrevealing except for mild cellulitis around old IRISH drain sites. He was discharged from the hospital on  with Augmentin from presumed cellulitis. Infectious work up again initiated. Blood cx prelim no growth. Urine cx pending. CT A/P obtained which showed moderate volume of perihepatic fluid, ascites, and peritoneal edema unchanged when compared to study of 17, R pleural fluid with compressive atelectasis, retained stool within colon and residual contrast from previous CT scan on . He was placed on broad spectrum antibiotics, Vanc and Cefepime.         Hospital Course:  Interval History: No acute events overnight. Patient feeling well this morning. Remains with abdominal pain. IR drained abdominal fluid and R pleural effusion yesterday. 1700cc removed from right  abdomen. 700cc removed from right lung  Cell count from abdomen with 4996 wbc, 74% segs. Thora cell count 587 wbc, 14% segs. Abdominal fluid neg for bile leak. Cultures pending. Blood cx remain with no growth. Urine cx no growth. No further episodes of fever. Remains on Cefepime. Will obtain random Vanc level to redose Vanc. Will discuss case with transplant ID physician, Dr. Bergman. Formal ID consult to follow tomorrow. Creatinine continues to increase. Made 1 L past 24 hours. Continuing to hold diuretics for hyponatremia, infection. Nephrology following. Per Nephrology, no need for HD yesterday. Awaiting today's recs. Appreciate their assistance. Monitor.         Scheduled Meds:   atovaquone  1,500 mg Oral Daily    ceFEPime (MAXIPIME) IVPB  1 g Intravenous Q24H    cycloSPORINE modified (NEORAL)  75 mg Oral BID    docusate sodium  100 mg Oral TID    ergocalciferol  50,000 Units Oral Q7 Days    heparin (porcine)  5,000 Units Subcutaneous Q8H    levETIRAcetam  500 mg Oral BID    multivitamin  1 tablet Oral Daily    pantoprazole  40 mg Oral Daily    polyethylene glycol  17 g Oral Daily    predniSONE  2.5 mg Oral Daily    sodium bicarbonate  1,300 mg Oral TID    thiamine  100 mg Oral Daily    [START ON 11/27/2017] valGANciclovir  450 mg Oral Every Mon, Wed, Fri     Continuous Infusions:   PRN Meds:acetaminophen, diphenhydrAMINE, hydrocodone-acetaminophen 5-325mg, HYDROmorphone, metoclopramide HCl, ondansetron, sodium chloride 0.9%, zolpidem    Review of Systems   Constitutional: Positive for activity change, fatigue and fever. Negative for chills.   Respiratory: Negative for cough, shortness of breath and wheezing.    Cardiovascular: Positive for leg swelling.   Gastrointestinal: Positive for abdominal pain and constipation. Negative for nausea and vomiting.   Genitourinary: Positive for scrotal swelling. Negative for difficulty urinating and dysuria.   Skin: Positive for wound.   Allergic/Immunologic:  Positive for immunocompromised state.   Neurological: Positive for weakness. Negative for speech difficulty.   Psychiatric/Behavioral: Negative for confusion and decreased concentration. The patient is nervous/anxious.      Objective:     Vital Signs (Most Recent):  Temp: 98.4 °F (36.9 °C) (11/26/17 0734)  Pulse: 93 (11/26/17 0734)  Resp: 16 (11/26/17 0734)  BP: 123/69 (11/26/17 0734)  SpO2: 98 % (11/26/17 0734) Vital Signs (24h Range):  Temp:  [97.6 °F (36.4 °C)-99.4 °F (37.4 °C)] 98.4 °F (36.9 °C)  Pulse:  [] 93  Resp:  [16-18] 16  SpO2:  [98 %-100 %] 98 %  BP: (111-126)/(67-73) 123/69     Weight: 80.4 kg (177 lb 3.2 oz)  Body mass index is 28.6 kg/m².    Intake/Output - Last 3 Shifts       11/24 0700 - 11/25 0659 11/25 0700 - 11/26 0659 11/26 0700 - 11/27 0659    P.O. 120 630     I.V. (mL/kg) 807.5 (9.8)      Blood  100     IV Piggyback 1300 50     Total Intake(mL/kg) 2227.5 (27.1) 780 (9.7)     Urine (mL/kg/hr) 1140 1030 (0.5)     Other  2400 (1.2)     Stool 0 0 (0)     Total Output 1140 3430      Net +1087.5 -2650             Urine Occurrence 0 x      Stool Occurrence 0 x 0 x 0 x          Physical Exam   Constitutional: He is oriented to person, place, and time. No distress.   Cardiovascular: Regular rhythm.  Tachycardia present.    Pulmonary/Chest: Effort normal. He has decreased breath sounds in the right middle field and the right lower field. He has no wheezes.   Abdominal: Soft. There is tenderness. There is no rebound and no guarding.   Wound vac to incision with surrounding erythema.    Neurological: He is alert and oriented to person, place, and time.   Skin: He is not diaphoretic.   Psychiatric: He has a normal mood and affect. His behavior is normal. Thought content normal.   Nursing note and vitals reviewed.      Laboratory:  Immunosuppressants         Stop Route Frequency     cycloSPORINE modified (NEORAL) capsule 75 mg      -- Oral 2 times daily        CBC:     Recent Labs  Lab 11/26/17  0723    WBC 7.27   RBC 2.57*   HGB 7.8*   HCT 22.8*      MCV 89   MCH 30.4   MCHC 34.2     CMP:     Recent Labs  Lab 11/26/17  0422   GLU 89   CALCIUM 8.4*   ALBUMIN 2.4*   PROT 5.1*   *   K 3.7   CO2 18*   CL 91*   BUN 40*   CREATININE 5.4*   ALKPHOS 116   ALT <5*   AST 13   BILITOT 2.3*     Labs within the past 24 hours have been reviewed.    Diagnostic Results:  US Liver Transplant Post:  No results found for this or any previous visit.    Assessment/Plan:     * Leukocytosis    Resolved with abx. See FUO.           Delayed surgical wound healing    Cont wound vac, replaced 11/25. Wound care consulted. Monitor.           Fever of unknown origin    Tmax 11/25 100.2. Leukocytosis resolved   Blood cx prelim no growth.   Urine cx no growth.   CT A/P obtained which showed moderate volume of perihepatic fluid, ascites, and peritoneal edema unchanged when compared to study of 11/20/17, R pleural fluid with compressive atelectasis, retained stool within colon and residual contrast from previous CT scan on 11/20.   -Wound vac removed from chevron incision no evidence of purulence seen.    -.IR drained abdominal fluid and R pleural effusion 11/25. 1700cc removed from right abdomen. 700cc removed from right lung  Cell count from abdomen with 4996 wbc, 74% segs. Thora cell count 587 wbc, 14% segs. Abdominal fluid neg for bile leak. Cultures pending. Blood cx remain with no growth. Urine cx no growth. No further episodes of fever. Remains on Cefepime. Will obtain random Vanc level to redose Vanc. Will discuss case with transplant ID physician, Dr. Bergman. Formal ID consult to follow tomorrow.  -Cont broad spectrum antibiotics.             Long-term use of immunosuppressant medication    Cont cyclosporine. Cont to monitor cyclo level with daily labs, monitor for toxic side effects, and adjust for therapeutic dose.           Prophylactic immunotherapy    See long term use of immunosuppressant medication.           Liver  transplanted    -LFTs stable. Cont to monitor with daily labs.           Acute renal failure with tubular necrosis    Patient with ongoing ATN since transplant.   Last HD 11/17. Was discharged on 11/22 on lasix 120 mg BID and metolazone 5 mg MWF. Holding diuretics for hyponatremia, infection.  Making urine but without renal clearance and also with significant edema. Nephrology consulted.   Nephrology did not feel as though patient needed HD yesterday. Awaiting recs from today. Appreciate their assistance.                 VTE Risk Mitigation         Ordered     heparin (porcine) injection 5,000 Units  Every 8 hours     Route:  Subcutaneous        11/25/17 1037     High Risk of VTE  Once      11/24/17 1611          The patients clinical status was discussed at multidisplinary rounds, involving transplant surgery, transplant medicine, pharmacy, nursing, nutrition, and social work    Discharge Planning: Not a candidate for discharge at this time.     Citlali Bridges [email or web address]  Liver Transplant  [facility]

## 2017-11-26 NOTE — ASSESSMENT & PLAN NOTE
Tmax 11/25 100.2. Leukocytosis resolved   Blood cx prelim no growth.   Urine cx no growth.   CT A/P obtained which showed moderate volume of perihepatic fluid, ascites, and peritoneal edema unchanged when compared to study of 11/20/17, R pleural fluid with compressive atelectasis, retained stool within colon and residual contrast from previous CT scan on 11/20.   -Wound vac removed from chevron incision no evidence of purulence seen.    -.IR drained abdominal fluid and R pleural effusion 11/25. 1700cc removed from right abdomen. 700cc removed from right lung  Cell count from abdomen with 4996 wbc, 74% segs. Thora cell count 587 wbc, 14% segs. Abdominal fluid neg for bile leak. Cultures pending. Blood cx remain with no growth. Urine cx no growth. No further episodes of fever. Remains on Cefepime. Will obtain random Vanc level to redose Vanc. Will discuss case with transplant ID physician, Dr. Bergman. Formal ID consult to follow tomorrow.  -Cont broad spectrum antibiotics.

## 2017-11-26 NOTE — SUBJECTIVE & OBJECTIVE
Scheduled Meds:   atovaquone  1,500 mg Oral Daily    ceFEPime (MAXIPIME) IVPB  1 g Intravenous Q24H    cycloSPORINE modified (NEORAL)  75 mg Oral BID    docusate sodium  100 mg Oral TID    ergocalciferol  50,000 Units Oral Q7 Days    heparin (porcine)  5,000 Units Subcutaneous Q8H    levETIRAcetam  500 mg Oral BID    multivitamin  1 tablet Oral Daily    pantoprazole  40 mg Oral Daily    polyethylene glycol  17 g Oral Daily    predniSONE  2.5 mg Oral Daily    sodium bicarbonate  1,300 mg Oral TID    thiamine  100 mg Oral Daily    [START ON 11/27/2017] valGANciclovir  450 mg Oral Every Mon, Wed, Fri     Continuous Infusions:   PRN Meds:acetaminophen, diphenhydrAMINE, hydrocodone-acetaminophen 5-325mg, HYDROmorphone, metoclopramide HCl, ondansetron, sodium chloride 0.9%, zolpidem    Review of Systems   Constitutional: Positive for activity change, fatigue and fever. Negative for chills.   Respiratory: Negative for cough, shortness of breath and wheezing.    Cardiovascular: Positive for leg swelling.   Gastrointestinal: Positive for abdominal pain and constipation. Negative for nausea and vomiting.   Genitourinary: Positive for scrotal swelling. Negative for difficulty urinating and dysuria.   Skin: Positive for wound.   Allergic/Immunologic: Positive for immunocompromised state.   Neurological: Positive for weakness. Negative for speech difficulty.   Psychiatric/Behavioral: Negative for confusion and decreased concentration. The patient is nervous/anxious.      Objective:     Vital Signs (Most Recent):  Temp: 98.4 °F (36.9 °C) (11/26/17 0734)  Pulse: 93 (11/26/17 0734)  Resp: 16 (11/26/17 0734)  BP: 123/69 (11/26/17 0734)  SpO2: 98 % (11/26/17 0734) Vital Signs (24h Range):  Temp:  [97.6 °F (36.4 °C)-99.4 °F (37.4 °C)] 98.4 °F (36.9 °C)  Pulse:  [] 93  Resp:  [16-18] 16  SpO2:  [98 %-100 %] 98 %  BP: (111-126)/(67-73) 123/69     Weight: 80.4 kg (177 lb 3.2 oz)  Body mass index is 28.6  kg/m².    Intake/Output - Last 3 Shifts       11/24 0700 - 11/25 0659 11/25 0700 - 11/26 0659 11/26 0700 - 11/27 0659    P.O. 120 630     I.V. (mL/kg) 807.5 (9.8)      Blood  100     IV Piggyback 1300 50     Total Intake(mL/kg) 2227.5 (27.1) 780 (9.7)     Urine (mL/kg/hr) 1140 1030 (0.5)     Other  2400 (1.2)     Stool 0 0 (0)     Total Output 1140 3430      Net +1087.5 -2650             Urine Occurrence 0 x      Stool Occurrence 0 x 0 x 0 x          Physical Exam   Constitutional: He is oriented to person, place, and time. No distress.   Cardiovascular: Regular rhythm.  Tachycardia present.    Pulmonary/Chest: Effort normal. He has decreased breath sounds in the right middle field and the right lower field. He has no wheezes.   Abdominal: Soft. There is tenderness. There is no rebound and no guarding.   Wound vac to incision with surrounding erythema.    Neurological: He is alert and oriented to person, place, and time.   Skin: He is not diaphoretic.   Psychiatric: He has a normal mood and affect. His behavior is normal. Thought content normal.   Nursing note and vitals reviewed.      Laboratory:  Immunosuppressants         Stop Route Frequency     cycloSPORINE modified (NEORAL) capsule 75 mg      -- Oral 2 times daily        CBC:     Recent Labs  Lab 11/26/17  0423   WBC 7.27   RBC 2.57*   HGB 7.8*   HCT 22.8*      MCV 89   MCH 30.4   MCHC 34.2     CMP:     Recent Labs  Lab 11/26/17  0422   GLU 89   CALCIUM 8.4*   ALBUMIN 2.4*   PROT 5.1*   *   K 3.7   CO2 18*   CL 91*   BUN 40*   CREATININE 5.4*   ALKPHOS 116   ALT <5*   AST 13   BILITOT 2.3*     Labs within the past 24 hours have been reviewed.    Diagnostic Results:  US Liver Transplant Post:  No results found for this or any previous visit.

## 2017-11-27 PROBLEM — K65.9 ABDOMINAL INFECTION: Status: ACTIVE | Noted: 2017-11-27

## 2017-11-27 LAB
ALBUMIN SERPL BCP-MCNC: 2.1 G/DL
ALP SERPL-CCNC: 114 U/L
ALT SERPL W/O P-5'-P-CCNC: 5 U/L
ANION GAP SERPL CALC-SCNC: 14 MMOL/L
AST SERPL-CCNC: 12 U/L
BASOPHILS # BLD AUTO: 0.04 K/UL
BASOPHILS NFR BLD: 0.6 %
BILIRUB SERPL-MCNC: 2.2 MG/DL
BUN SERPL-MCNC: 42 MG/DL
CALCIUM SERPL-MCNC: 8.2 MG/DL
CHLORIDE SERPL-SCNC: 91 MMOL/L
CO2 SERPL-SCNC: 22 MMOL/L
CREAT SERPL-MCNC: 5 MG/DL
CYCLOSPORINE BLD LC/MS/MS-MCNC: 209 NG/ML
DIFFERENTIAL METHOD: ABNORMAL
EOSINOPHIL # BLD AUTO: 0 K/UL
EOSINOPHIL NFR BLD: 0.2 %
ERYTHROCYTE [DISTWIDTH] IN BLOOD BY AUTOMATED COUNT: 17.1 %
EST. GFR  (AFRICAN AMERICAN): 16.8 ML/MIN/1.73 M^2
EST. GFR  (NON AFRICAN AMERICAN): 14.6 ML/MIN/1.73 M^2
GLUCOSE SERPL-MCNC: 73 MG/DL
HCT VFR BLD AUTO: 24.4 %
HGB BLD-MCNC: 8.5 G/DL
IMM GRANULOCYTES # BLD AUTO: 0.12 K/UL
IMM GRANULOCYTES NFR BLD AUTO: 1.9 %
LYMPHOCYTES # BLD AUTO: 1 K/UL
LYMPHOCYTES NFR BLD: 14.9 %
MAGNESIUM SERPL-MCNC: 1.5 MG/DL
MCH RBC QN AUTO: 30.8 PG
MCHC RBC AUTO-ENTMCNC: 34.8 G/DL
MCV RBC AUTO: 88 FL
MONOCYTES # BLD AUTO: 0.9 K/UL
MONOCYTES NFR BLD: 14.1 %
NEUTROPHILS # BLD AUTO: 4.4 K/UL
NEUTROPHILS NFR BLD: 68.3 %
NRBC BLD-RTO: 0 /100 WBC
PHOSPHATE SERPL-MCNC: 4.1 MG/DL
PLATELET # BLD AUTO: 382 K/UL
PMV BLD AUTO: 9.5 FL
POTASSIUM SERPL-SCNC: 3.3 MMOL/L
PROT SERPL-MCNC: 4.7 G/DL
RBC # BLD AUTO: 2.76 M/UL
SODIUM SERPL-SCNC: 127 MMOL/L
VANCOMYCIN SERPL-MCNC: 18.2 UG/ML
WBC # BLD AUTO: 6.46 K/UL

## 2017-11-27 PROCEDURE — 25000003 PHARM REV CODE 250: Performed by: PHYSICIAN ASSISTANT

## 2017-11-27 PROCEDURE — 83735 ASSAY OF MAGNESIUM: CPT

## 2017-11-27 PROCEDURE — 36415 COLL VENOUS BLD VENIPUNCTURE: CPT

## 2017-11-27 PROCEDURE — 63600175 PHARM REV CODE 636 W HCPCS: Performed by: PHYSICIAN ASSISTANT

## 2017-11-27 PROCEDURE — 63600175 PHARM REV CODE 636 W HCPCS: Performed by: PEDIATRICS

## 2017-11-27 PROCEDURE — 63600175 PHARM REV CODE 636 W HCPCS: Performed by: TRANSPLANT SURGERY

## 2017-11-27 PROCEDURE — 97608 NEG PRS WND THER NDME>50SQCM: CPT

## 2017-11-27 PROCEDURE — 20600001 HC STEP DOWN PRIVATE ROOM

## 2017-11-27 PROCEDURE — 84100 ASSAY OF PHOSPHORUS: CPT

## 2017-11-27 PROCEDURE — 99232 SBSQ HOSP IP/OBS MODERATE 35: CPT | Mod: ,,, | Performed by: INTERNAL MEDICINE

## 2017-11-27 PROCEDURE — 85025 COMPLETE CBC W/AUTO DIFF WBC: CPT

## 2017-11-27 PROCEDURE — 80202 ASSAY OF VANCOMYCIN: CPT

## 2017-11-27 PROCEDURE — 25000003 PHARM REV CODE 250: Performed by: PEDIATRICS

## 2017-11-27 PROCEDURE — 80158 DRUG ASSAY CYCLOSPORINE: CPT

## 2017-11-27 PROCEDURE — 80053 COMPREHEN METABOLIC PANEL: CPT

## 2017-11-27 PROCEDURE — 99233 SBSQ HOSP IP/OBS HIGH 50: CPT | Mod: 24,,, | Performed by: PHYSICIAN ASSISTANT

## 2017-11-27 PROCEDURE — 99255 IP/OBS CONSLTJ NEW/EST HI 80: CPT | Mod: ,,, | Performed by: INTERNAL MEDICINE

## 2017-11-27 RX ORDER — POTASSIUM CHLORIDE 750 MG/1
20 CAPSULE, EXTENDED RELEASE ORAL ONCE
Status: COMPLETED | OUTPATIENT
Start: 2017-11-27 | End: 2017-11-27

## 2017-11-27 RX ORDER — MAGNESIUM SULFATE HEPTAHYDRATE 40 MG/ML
2 INJECTION, SOLUTION INTRAVENOUS
Status: COMPLETED | OUTPATIENT
Start: 2017-11-27 | End: 2017-11-27

## 2017-11-27 RX ORDER — OXYCODONE AND ACETAMINOPHEN 5; 325 MG/1; MG/1
1 TABLET ORAL EVERY 4 HOURS PRN
Status: DISCONTINUED | OUTPATIENT
Start: 2017-11-27 | End: 2017-11-30 | Stop reason: HOSPADM

## 2017-11-27 RX ORDER — PREDNISONE 2.5 MG/1
2.5 TABLET ORAL DAILY
Status: DISCONTINUED | OUTPATIENT
Start: 2017-11-28 | End: 2017-11-30 | Stop reason: HOSPADM

## 2017-11-27 RX ORDER — OXYCODONE AND ACETAMINOPHEN 10; 325 MG/1; MG/1
1 TABLET ORAL EVERY 6 HOURS PRN
Status: DISCONTINUED | OUTPATIENT
Start: 2017-11-27 | End: 2017-11-30 | Stop reason: HOSPADM

## 2017-11-27 RX ADMIN — HYDROMORPHONE HYDROCHLORIDE 1 MG: 2 INJECTION INTRAMUSCULAR; INTRAVENOUS; SUBCUTANEOUS at 09:11

## 2017-11-27 RX ADMIN — POLYETHYLENE GLYCOL 3350 17 G: 17 POWDER, FOR SOLUTION ORAL at 08:11

## 2017-11-27 RX ADMIN — Medication 100 MG: at 12:11

## 2017-11-27 RX ADMIN — SODIUM BICARBONATE 650 MG TABLET 1300 MG: at 04:11

## 2017-11-27 RX ADMIN — HEPARIN SODIUM 5000 UNITS: 5000 INJECTION, SOLUTION INTRAVENOUS; SUBCUTANEOUS at 08:11

## 2017-11-27 RX ADMIN — DOCUSATE SODIUM 100 MG: 100 CAPSULE, LIQUID FILLED ORAL at 01:11

## 2017-11-27 RX ADMIN — THERA TABS 1 TABLET: TAB at 12:11

## 2017-11-27 RX ADMIN — DOCUSATE SODIUM 100 MG: 100 CAPSULE, LIQUID FILLED ORAL at 08:11

## 2017-11-27 RX ADMIN — PANTOPRAZOLE SODIUM 40 MG: 40 TABLET, DELAYED RELEASE ORAL at 12:11

## 2017-11-27 RX ADMIN — OXYCODONE HYDROCHLORIDE AND ACETAMINOPHEN 1 TABLET: 10; 325 TABLET ORAL at 11:11

## 2017-11-27 RX ADMIN — LEVETIRACETAM 500 MG: 500 TABLET, FILM COATED ORAL at 08:11

## 2017-11-27 RX ADMIN — HEPARIN SODIUM 5000 UNITS: 5000 INJECTION, SOLUTION INTRAVENOUS; SUBCUTANEOUS at 02:11

## 2017-11-27 RX ADMIN — LEVETIRACETAM 500 MG: 500 TABLET, FILM COATED ORAL at 11:11

## 2017-11-27 RX ADMIN — SODIUM BICARBONATE 650 MG TABLET 1300 MG: at 08:11

## 2017-11-27 RX ADMIN — HEPARIN SODIUM 5000 UNITS: 5000 INJECTION, SOLUTION INTRAVENOUS; SUBCUTANEOUS at 04:11

## 2017-11-27 RX ADMIN — CYCLOSPORINE 75 MG: 25 CAPSULE, LIQUID FILLED ORAL at 11:11

## 2017-11-27 RX ADMIN — CEFEPIME HYDROCHLORIDE 1 G: 1 INJECTION, SOLUTION INTRAVENOUS at 05:11

## 2017-11-27 RX ADMIN — OXYCODONE HYDROCHLORIDE AND ACETAMINOPHEN 1 TABLET: 10; 325 TABLET ORAL at 05:11

## 2017-11-27 RX ADMIN — HYDROMORPHONE HYDROCHLORIDE 1 MG: 2 INJECTION INTRAMUSCULAR; INTRAVENOUS; SUBCUTANEOUS at 04:11

## 2017-11-27 RX ADMIN — POTASSIUM CHLORIDE 20 MEQ: 750 CAPSULE, EXTENDED RELEASE ORAL at 01:11

## 2017-11-27 RX ADMIN — ONDANSETRON 4 MG: 2 INJECTION INTRAMUSCULAR; INTRAVENOUS at 07:11

## 2017-11-27 RX ADMIN — DOCUSATE SODIUM 100 MG: 100 CAPSULE, LIQUID FILLED ORAL at 04:11

## 2017-11-27 RX ADMIN — PREDNISONE 2.5 MG: 2.5 TABLET ORAL at 12:11

## 2017-11-27 RX ADMIN — MAGNESIUM SULFATE IN WATER 2 G: 40 INJECTION, SOLUTION INTRAVENOUS at 08:11

## 2017-11-27 RX ADMIN — MAGNESIUM SULFATE IN WATER 2 G: 40 INJECTION, SOLUTION INTRAVENOUS at 10:11

## 2017-11-27 RX ADMIN — CYCLOSPORINE 75 MG: 25 CAPSULE, LIQUID FILLED ORAL at 05:11

## 2017-11-27 RX ADMIN — ATOVAQUONE 1500 MG: 750 SUSPENSION ORAL at 12:11

## 2017-11-27 RX ADMIN — SODIUM BICARBONATE 650 MG TABLET 1300 MG: at 01:11

## 2017-11-27 RX ADMIN — VALGANCICLOVIR 450 MG: 450 TABLET, FILM COATED ORAL at 05:11

## 2017-11-27 NOTE — SUBJECTIVE & OBJECTIVE
Interval History: NAEON, feeling better today but feels tired since he has gotten little rest. No evidence of uremic sx. C/o poor appetite and some nausea but no emesis. sCr today imroved to 5.0 from 5.4. UO increased to 1.7 lts/24hrs. Net neg 140 cc/24 hrs.    Review of patient's allergies indicates:   Allergen Reactions    Bactrim [sulfamethoxazole-trimethoprim] Other (See Comments)     Mookie Trell Syndrome     Current Facility-Administered Medications   Medication Frequency    acetaminophen tablet 650 mg Q8H PRN    atovaquone suspension 1,500 mg Daily    cefepime in dextrose 5 % 1 gram/50 mL IVPB 1 g Q24H    cycloSPORINE modified (NEORAL) capsule 75 mg BID    diphenhydrAMINE injection 25 mg Q4H PRN    docusate sodium capsule 100 mg TID    ergocalciferol capsule 50,000 Units Q7 Days    heparin (porcine) injection 5,000 Units Q8H    hydrocodone-acetaminophen 5-325mg per tablet 1 tablet Q4H PRN    HYDROmorphone (PF) injection 1 mg Q4H PRN    levETIRAcetam tablet 500 mg BID    metoclopramide HCl tablet 10 mg Q6H PRN    multivitamin tablet 1 tablet Daily    ondansetron injection 4 mg Q12H PRN    pantoprazole EC tablet 40 mg Daily    polyethylene glycol packet 17 g Daily    predniSONE tablet 2.5 mg Daily    sodium bicarbonate tablet 1,300 mg TID    sodium chloride 0.9% flush 3 mL PRN    thiamine tablet 100 mg Daily    valGANciclovir tablet 450 mg Every Mon, Wed, Fri    zolpidem tablet 5 mg Nightly PRN       Objective:     Vital Signs (Most Recent):  Temp: 98.2 °F (36.8 °C) (11/27/17 1132)  Pulse: 102 (11/27/17 1132)  Resp: 18 (11/27/17 1132)  BP: 119/65 (11/27/17 1132)  SpO2: 100 % (11/27/17 1132)  O2 Device (Oxygen Therapy): room air (11/27/17 1132) Vital Signs (24h Range):  Temp:  [98.2 °F (36.8 °C)-98.8 °F (37.1 °C)] 98.2 °F (36.8 °C)  Pulse:  [] 102  Resp:  [14-20] 18  SpO2:  [98 %-100 %] 100 %  BP: (114-130)/(60-70) 119/65     Weight: 79.9 kg (176 lb 3.2 oz) (11/27/17 4785)  Body  mass index is 28.44 kg/m².  Body surface area is 1.93 meters squared.    I/O last 3 completed shifts:  In: 1910 [P.O.:1110; Blood:100; IV Piggyback:700]  Out: 2050 [Urine:2050]    Physical Exam   Constitutional: He is oriented to person, place, and time. He appears well-developed and well-nourished. No distress.   HENT:   Head: Normocephalic and atraumatic.   Eyes: Conjunctivae are normal. Pupils are equal, round, and reactive to light.   Neck: Trachea normal and normal range of motion. Neck supple. No JVD present.   Cardiovascular: Normal rate, regular rhythm, S1 normal, S2 normal, normal heart sounds, intact distal pulses and normal pulses.  Exam reveals no gallop and no friction rub.    No murmur heard.  Pulmonary/Chest: Effort normal and breath sounds normal.   Abdominal: Soft. Bowel sounds are normal. He exhibits no distension. There is tenderness.   Would vac place to incision with surrounding erythema.    Musculoskeletal: Normal range of motion.   Neurological: He is alert and oriented to person, place, and time.   Skin: Skin is warm and dry. Capillary refill takes less than 2 seconds.   Psychiatric: He has a normal mood and affect. His behavior is normal.   Vitals reviewed.      Significant Labs:  CBC:   Recent Labs  Lab 11/27/17  0621   WBC 6.46   RBC 2.76*   HGB 8.5*   HCT 24.4*   *   MCV 88   MCH 30.8   MCHC 34.8     CMP:   Recent Labs  Lab 11/27/17  0621   GLU 73   CALCIUM 8.2*   ALBUMIN 2.1*   PROT 4.7*   *   K 3.3*   CO2 22*   CL 91*   BUN 42*   CREATININE 5.0*   ALKPHOS 114   ALT 5*   AST 12   BILITOT 2.2*     Coagulation:   Recent Labs  Lab 11/25/17  0507   INR 1.2     All labs within the past 24 hours have been reviewed.     Significant Imaging:  Labs: Reviewed  X-Ray: Reviewed

## 2017-11-27 NOTE — PROGRESS NOTES
[facility]  Liver Transplant  Progress Note    Patient Name: Jhonny Diana  MRN: [id number]  Admission Date: 2017  Hospital Length of Stay: 3 days  Code Status: Full Code  Primary Care Provider: Primary Doctor No  Post-Operative Day: 39    ORGAN:   LIVER  Disease Etiology: Acute Alcoholic Hepatitis  Donor Type:    - Brain Death  CDC High Risk:   No  Donor CMV Status:   Donor CMV Status: Positive  Donor HBcAB:   Negative  Donor HCV Status:   Negative  Whole or Partial: Whole Liver  Biliary Anastomosis: End to End  Arterial Anatomy: Standard  Subjective:     History of Present Illness:  Mr Diana  is a 27 y.o. male with hx  ESLD secondary ETOH.  Now s/p oltx 10/19/2017 (Liver), which took place without complication.  Steroid induction, CMV +/+.  Post op complicated by LAURA, seizures, and delayed wound healing requiring wound vac to entire chevron incision. Patient was admitted to the hospital with complaint of fatigue and fever along with R sided abdominal pain, constipation. Lab work notable for leukocytosis as an outpatient, wbc 13. Patient was recently admitted for FUO on . Infectious work up at that time unrevealing except for mild cellulitis around old IRISH drain sites. He was discharged from the hospital on  with Augmentin from presumed cellulitis. Infectious work up again initiated. Blood cx prelim no growth. Urine cx pending. CT A/P obtained which showed moderate volume of perihepatic fluid, ascites, and peritoneal edema unchanged when compared to study of 17, R pleural fluid with compressive atelectasis, retained stool within colon and residual contrast from previous CT scan on . He was placed on broad spectrum antibiotics, Vanc and Cefepime.         Hospital Course:  Interval History: No acute events overnight. Patient feeling well today. Afebrile. Remains on Cefepime/Vanc. Cultures pending from abdominal and pleural fluid. ID consulted, to see patient today.  Creatinine improved today to 5.0 from 5.4 with increased UOP 1700 cc past 24 hours. Discussed with Nephrology. Plan to continue to monitor renal function. No need for HD as now with renal clearance. Cont to monitor.              Scheduled Meds:   atovaquone  1,500 mg Oral Daily    ceFEPime (MAXIPIME) IVPB  1 g Intravenous Q24H    cycloSPORINE modified (NEORAL)  75 mg Oral BID    docusate sodium  100 mg Oral TID    ergocalciferol  50,000 Units Oral Q7 Days    heparin (porcine)  5,000 Units Subcutaneous Q8H    levETIRAcetam  500 mg Oral BID    multivitamin  1 tablet Oral Daily    pantoprazole  40 mg Oral Daily    polyethylene glycol  17 g Oral Daily    [START ON 11/28/2017] predniSONE  2.5 mg Oral Daily    sodium bicarbonate  1,300 mg Oral TID    thiamine  100 mg Oral Daily    valGANciclovir  450 mg Oral Every Mon, Wed, Fri     Continuous Infusions:   PRN Meds:acetaminophen, diphenhydrAMINE, hydrocodone-acetaminophen 5-325mg, HYDROmorphone, metoclopramide HCl, ondansetron, sodium chloride 0.9%, zolpidem    Review of Systems   Constitutional: Positive for activity change, fatigue and fever. Negative for chills.   Respiratory: Negative for cough, shortness of breath and wheezing.    Cardiovascular: Positive for leg swelling.   Gastrointestinal: Positive for abdominal pain and constipation. Negative for nausea and vomiting.   Genitourinary: Positive for scrotal swelling. Negative for difficulty urinating and dysuria.   Skin: Positive for wound.   Allergic/Immunologic: Positive for immunocompromised state.   Neurological: Positive for weakness. Negative for speech difficulty.   Psychiatric/Behavioral: Negative for confusion and decreased concentration. The patient is nervous/anxious.      Objective:     Vital Signs (Most Recent):  Temp: 98.2 °F (36.8 °C) (11/27/17 1132)  Pulse: 102 (11/27/17 1132)  Resp: 18 (11/27/17 1132)  BP: 119/65 (11/27/17 1132)  SpO2: 100 % (11/27/17 1132) Vital Signs (24h  Range):  Temp:  [98.2 °F (36.8 °C)-98.8 °F (37.1 °C)] 98.2 °F (36.8 °C)  Pulse:  [] 102  Resp:  [14-20] 18  SpO2:  [98 %-100 %] 100 %  BP: (114-130)/(60-70) 119/65     Weight: 79.9 kg (176 lb 3.2 oz)  Body mass index is 28.44 kg/m².    Intake/Output - Last 3 Shifts       11/25 0700 - 11/26 0659 11/26 0700 - 11/27 0659 11/27 0700 - 11/28 0659    P.O. 630 980 550    I.V. (mL/kg)       Blood 100      IV Piggyback 50 650     Total Intake(mL/kg) 780 (9.7) 1630 (20.4) 550 (6.9)    Urine (mL/kg/hr) 1030 (0.5) 1770 (0.9) 1060 (1.6)    Emesis/NG output  0 (0)     Other 2400 (1.2) 0 (0)     Stool 0 (0) 0 (0)     Blood  0 (0)     Total Output 3430 1770 1060    Net -2650 -140 -510           Urine Occurrence  0 x     Stool Occurrence 0 x 1 x     Emesis Occurrence  0 x           Physical Exam   Constitutional: He is oriented to person, place, and time. No distress.   Cardiovascular: Regular rhythm.  Tachycardia present.    Pulmonary/Chest: Effort normal. He has decreased breath sounds in the right middle field and the right lower field. He has no wheezes.   Abdominal: Soft. There is tenderness. There is no rebound and no guarding.   Wound vac to incision with surrounding erythema.    Neurological: He is alert and oriented to person, place, and time.   Skin: He is not diaphoretic.   Psychiatric: He has a normal mood and affect. His behavior is normal. Thought content normal.   Nursing note and vitals reviewed.      Laboratory:  Immunosuppressants         Stop Route Frequency     cycloSPORINE modified (NEORAL) capsule 75 mg      -- Oral 2 times daily        CBC:     Recent Labs  Lab 11/27/17  0621   WBC 6.46   RBC 2.76*   HGB 8.5*   HCT 24.4*   *   MCV 88   MCH 30.8   MCHC 34.8     CMP:     Recent Labs  Lab 11/27/17  0621   GLU 73   CALCIUM 8.2*   ALBUMIN 2.1*   PROT 4.7*   *   K 3.3*   CO2 22*   CL 91*   BUN 42*   CREATININE 5.0*   ALKPHOS 114   ALT 5*   AST 12   BILITOT 2.2*     Labs within the past 24 hours  have been reviewed.    Diagnostic Results:  US Liver Transplant Post:  No results found for this or any previous visit.    Assessment/Plan:     * Leukocytosis    Resolved with abx. See FUO.           Hypomagnesemia    Will replace with IV Mg. Monitor with daily labs.           Delayed surgical wound healing    Cont wound vac, replaced 11/25. Wound care consulted. Monitor.           Fever of unknown origin    Tmax 11/25 100.2. Leukocytosis resolved   Blood cx prelim no growth.   Urine cx no growth.   CT A/P obtained which showed moderate volume of perihepatic fluid, ascites, and peritoneal edema unchanged when compared to study of 11/20/17, R pleural fluid with compressive atelectasis, retained stool within colon and residual contrast from previous CT scan on 11/20.   -Wound vac removed from chevron incision no evidence of purulence seen.    -.IR drained abdominal fluid and R pleural effusion 11/25. 1700cc removed from right abdomen. 700cc removed from right lung  Cell count from abdomen with 4996 wbc, 74% segs. Thora cell count 587 wbc, 14% segs. Abdominal fluid neg for bile leak. Cultures pending.   Blood cx remain with no growth. Urine cx no growth. No further episodes of fever.   Remains on Cefepime/Vanc. ID consulted, to see patient today. Appreciate their assistance.  -Cont broad spectrum antibiotics.             Long-term use of immunosuppressant medication    Cont cyclosporine. Cont to monitor cyclo level with daily labs, monitor for toxic side effects, and adjust for therapeutic dose.           Prophylactic immunotherapy    See long term use of immunosuppressant medication.           Liver transplanted    -LFTs stable. Cont to monitor with daily labs.           Acute renal failure with tubular necrosis    Patient with ongoing ATN since transplant.   Last HD 11/17. Was discharged on 11/22 on lasix 120 mg BID and metolazone 5 mg MWF. Holding diuretics for hyponatremia, infection.  Nephrology consulted for help  with management.  Creatinine with improvement today, 5.0 from 5.4 and with increased UOP past 24 hours. Plan to continue to monitor renal recovery. No need for HD currently. Appreciate Nephrology assistance.                   VTE Risk Mitigation         Ordered     heparin (porcine) injection 5,000 Units  Every 8 hours     Route:  Subcutaneous        11/25/17 1037     High Risk of VTE  Once      11/24/17 1611          The patients clinical status was discussed at multidisplinary rounds, involving transplant surgery, transplant medicine, pharmacy, nursing, nutrition, and social work    Discharge Planning: Not a candidate for discharge at this time.     Citlali Bridges PA-C  Liver Transplant  [facility]

## 2017-11-27 NOTE — PROGRESS NOTES
Admit Note     Met with patient and mother to assess patients needs due to pt sleeping.  Patient is a 27 y.o. single male, admitted post liver transplantation on 10/19/17; and admitted for elevated liver enzymes.     Patient admitted from local lodging at [id number] on 11/24/2017 .  At this time, patient presents as alert and oriented x 4.  At this time, patients caregiver presents as alert and oriented x 4, pleasant, good eye contact, well groomed, recall good, concentration/judgement good, average intelligence, calm, communicative, cooperative and asking and answering questions appropriately.      Household/Family Systems (as reported by patients caregiver)     Patient resides with patient's mother, at [address]  [place] [address].  Support system includes pt's friend and former roommate Kaila Schwarz, pt's mother Sanjuanita, sister Miranda, and aunt Sophy.  Patient does not have dependents that are need of being cared for.     Patients primary caregiver is Sanjuanita Lebron, patients mother, phone number [phone], [employer] apartment phone number [phone].  Confirmed patients contact information is [phone] (home);   Telephone Information:   Mobile [phone]   .    During admission, patient's caregiver plans to stay in patient's room.  Confirmed patient and patients caregivers do have access to reliable transportation.    Cognitive Status/Learning     Patients caregiver reports patients reading ability as college and states patient does not have difficulty with N/A.  Patients caregiver reports patient learns best by written, verbal, and demonstration.   Needed: No.   Highest education level:some college.    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 Pt is currently getting unpaid leave through [facility] but does have STD  "that will allow him 60% of his salary. Pt reports he has not yet received STD. Mother nad pt are attmepting to get this taken care of and mother states she has been having issues with [facility]. Mother states as she is aware, pt's company is still paying his insurance premiums for [facility]. .    Adherence     Patients caregiver reports patient has a high level of adherence to patients health care regimen.  Adherence counseling and education provided.  Patient's caregiver verbalizes understanding.    Substance Use    Patients caregiver reports patients substance usage as the following:    Tobacco: none, patient denies any use.  Alcohol: none, patient denies any use. Last use was August 2017, when he became acutely sick with ETOH cirrhosis. Prior to quitting, pt would drink 1/5th of whiskey daily from 4321-2265. Pt attempted to stop drinking in 2016 by tapering down to 4-5 "stiff" drinks of whiskey per say and in the beginning of 2017 was down to a glass of wine per day. Pt attributes ETOH use to job stressors. Pt reports that he will enroll in ABU upon being medically able. Pt has has multiple readmissions to hospital and has not yet enrolled. SW provided contact ([phone] x 2). Pt's mother reports she is looking forward to him being able to attend.   Illicit Drugs/Non-prescribed Medications: none, patient denies any use. Pt reports brief addiction to Lortabs in 2011 right after college.   Patients caregiver states clear understanding of the potential impact of substance use.  Substance abstinence/cessation counseling, education and resources provided and reviewed.     Services Utilizing/ADLS (as reported by patients caregiver)    Infusion Service: Prior to admission, patient utilizing? no  Home Health: Prior to admission, patient utilizing? yes, [facility] for SN & PT  DME: Prior to admission, yes [employer] wound vac, rolling walker, and w/c  Pulmonary/Cardiac Rehab: Prior to " admission, no  Dialysis:  Prior to admission, yes pt has a chair at [facility] at 3 pm but has not needed dialysis in a week.  Transplant Specialty Pharmacy:  Prior to admission, yes; [facility].    Prior to admission, patients caregiver reports patient was not independent with ADLS and was not driving.  Patients caregiver reports patient is not able to care for self at this time due to compromised medical condition (as documented in medical record) and physical weakness..  Patients caregiver reports patient indicates a willingness to care for self once medically cleared to do so.    Insurance/Medications    Insured by   Payor/Plan Subscr  Sex Relation Sub. Ins. ID Effective Group Num   1. [facility]* RAFFY DIANA 1990 Male  [id number]                                   [address] [address]   2. [facility]* RAFFY DIANA 1990 Male  [id number]                                   [address]      Primary Insurance (for UNOS reporting): Private Insurance  Secondary Insurance (for UNOS reporting): None    Patients caregiver reports patient is able to obtain and afford medications at this time and at time of discharge.    Living Will/Healthcare Power of     Patients caregiver reports patient has a LW and/or HCPA.   provided education regarding LW and HCPA and the completion of forms.    Coping/Mental Health (as reported by patients caregiver)    Patient is coping well with the aid of  family members and friends. Patient is open to attending ABU to address past ETOH addiction and social anxiety. Patients caregiver is coping well with the aid of  family members and friends.     Discharge Planning (as reported by patients caregiver)    At time of discharge, patient plans to return to [email or web address] apartments under the care of Sanjuanita Diana.  Patients mother will transport patient.  Per rounds today, expected discharge date has not been medically determined  at this time. Patients caretaker verbalizes understanding and is involved in treatment planning and discharge process.    Additional Concerns    Patient's caretaker denies additional needs and/or concerns at this time. Patient is being followed for needs, education, resources, information, emotional support, supportive counseling, and for supportive and skilled discharge plan of care.  providing ongoing psychosocial support, education, resources and d/c planning as needed.  SW remains available.  provided resource list, patient choice, psychosocial and supportive counseling, resources, education, assistance and discharge planning with patient and caregiver involvement, ongoing SW availability and services as appropriate.  remains available. Patient's caregiver verbalizes understanding and agreement with information reviewed,  availability and how to access available resources as needed. Patient denies additional needs and/or concerns at this time. Patient verbalizes understanding and agreement with information reviewed, social work availability, and how to access available resources as needed.

## 2017-11-27 NOTE — PROGRESS NOTES
[facility]  Nephrology  Progress Note    Patient Name: Jhonny Diana  MRN: [id number]  Admission Date: 11/24/2017  Hospital Length of Stay: 3 days  Attending Provider: Alin López Jr., MD   Primary Care Physician: Primary Doctor No  Principal Problem:Leukocytosis    Subjective:     HPI: Mr Diana  is a 27 y.o. M Alcoholic cirrhosis s/p LTx 10/19/17, who presents with fatigue and low-grade temp () at home.   His post-op stay after liver transplant was complicated by wound infection requiring recurrent ex-laparotomies. He was currently on a 10-day course of augmentin to finish 11/28.   Post transplant, his developed acute renal failure requiring HD M/W/F until he started making some urine. Following diuretics (lasix and diuril), his urine outpiut increased and pt was discharged on lasix 120mg BID & metolazone 5mg. Last HD was 11/17/17.   Pt reports that since discharge, on 11/22, he has felt fine other than decreased energy and feeling tired. He denies chills. Had low-grade temperature.   I/O have been meticulously recorded by mother and he is has been matching I & O. Denies dysuria, urinary urgency, frequency, hematuria, flank pain.     Interval History: NAEON, feeling better today but feels tired since he has gotten little rest. No evidence of uremic sx. C/o poor appetite and some nausea but no emesis. sCr today imroved to 5.0 from 5.4. UO increased to 1.7 lts/24hrs. Net neg 140 cc/24 hrs.    Review of patient's allergies indicates:   Allergen Reactions    Bactrim [sulfamethoxazole-trimethoprim] Other (See Comments)     Mookie Trell Syndrome     Current Facility-Administered Medications   Medication Frequency    acetaminophen tablet 650 mg Q8H PRN    atovaquone suspension 1,500 mg Daily    cefepime in dextrose 5 % 1 gram/50 mL IVPB 1 g Q24H    cycloSPORINE modified (NEORAL) capsule 75 mg BID    diphenhydrAMINE injection 25 mg Q4H PRN    docusate sodium capsule 100 mg TID     ergocalciferol capsule 50,000 Units Q7 Days    heparin (porcine) injection 5,000 Units Q8H    hydrocodone-acetaminophen 5-325mg per tablet 1 tablet Q4H PRN    HYDROmorphone (PF) injection 1 mg Q4H PRN    levETIRAcetam tablet 500 mg BID    metoclopramide HCl tablet 10 mg Q6H PRN    multivitamin tablet 1 tablet Daily    ondansetron injection 4 mg Q12H PRN    pantoprazole EC tablet 40 mg Daily    polyethylene glycol packet 17 g Daily    predniSONE tablet 2.5 mg Daily    sodium bicarbonate tablet 1,300 mg TID    sodium chloride 0.9% flush 3 mL PRN    thiamine tablet 100 mg Daily    valGANciclovir tablet 450 mg Every Mon, Wed, Fri    zolpidem tablet 5 mg Nightly PRN       Objective:     Vital Signs (Most Recent):  Temp: 98.2 °F (36.8 °C) (11/27/17 1132)  Pulse: 102 (11/27/17 1132)  Resp: 18 (11/27/17 1132)  BP: 119/65 (11/27/17 1132)  SpO2: 100 % (11/27/17 1132)  O2 Device (Oxygen Therapy): room air (11/27/17 1132) Vital Signs (24h Range):  Temp:  [98.2 °F (36.8 °C)-98.8 °F (37.1 °C)] 98.2 °F (36.8 °C)  Pulse:  [] 102  Resp:  [14-20] 18  SpO2:  [98 %-100 %] 100 %  BP: (114-130)/(60-70) 119/65     Weight: 79.9 kg (176 lb 3.2 oz) (11/27/17 0449)  Body mass index is 28.44 kg/m².  Body surface area is 1.93 meters squared.    I/O last 3 completed shifts:  In: 1910 [P.O.:1110; Blood:100; IV Piggyback:700]  Out: 2050 [Urine:2050]    Physical Exam   Constitutional: He is oriented to person, place, and time. He appears well-developed and well-nourished. No distress.   HENT:   Head: Normocephalic and atraumatic.   Eyes: Conjunctivae are normal. Pupils are equal, round, and reactive to light.   Neck: Trachea normal and normal range of motion. Neck supple. No JVD present.   Cardiovascular: Normal rate, regular rhythm, S1 normal, S2 normal, normal heart sounds, intact distal pulses and normal pulses.  Exam reveals no gallop and no friction rub.    No murmur heard.  Pulmonary/Chest: Effort normal and breath sounds  normal.   Abdominal: Soft. Bowel sounds are normal. He exhibits no distension. There is tenderness.   Would vac place to incision with surrounding erythema.    Musculoskeletal: Normal range of motion.   Neurological: He is alert and oriented to person, place, and time.   Skin: Skin is warm and dry. Capillary refill takes less than 2 seconds.   Psychiatric: He has a normal mood and affect. His behavior is normal.   Vitals reviewed.      Significant Labs:  CBC:   Recent Labs  Lab 11/27/17  0621   WBC 6.46   RBC 2.76*   HGB 8.5*   HCT 24.4*   *   MCV 88   MCH 30.8   MCHC 34.8     CMP:   Recent Labs  Lab 11/27/17  0621   GLU 73   CALCIUM 8.2*   ALBUMIN 2.1*   PROT 4.7*   *   K 3.3*   CO2 22*   CL 91*   BUN 42*   CREATININE 5.0*   ALKPHOS 114   ALT 5*   AST 12   BILITOT 2.2*     Coagulation:   Recent Labs  Lab 11/25/17  0507   INR 1.2     All labs within the past 24 hours have been reviewed.     Significant Imaging:  Labs: Reviewed  X-Ray: Reviewed    Assessment/Plan:     Acute renal failure with tubular necrosis    Pt is a 24 y/o M with LTx 10/19/17 complicated by klebsiella bacteremia and abdominal wound infection who presents with leukocytosis and pyrexia.    - LAURA on CKD (baseline sCr 3.9 - 4.0). Likely instrinsic etiology, multifactorial secondary to sepsis vs. Toxins. Pt is on many medications which can cause renal injury, including but not limited to atovaquone, valgancyclovir, vanc, cyclosporine.   - Improved UOP 1.7 L/day despite holding lasix. Agree with continuing holding lasix  - Improved renal function with stable electrolytes and sCr peaked at 5.4 now better at 5.0, will need close monitoring of renal function upon dc  - no need for RRT today, will reassess in am  - continue supportive care with fluid hydration and treat underlying etiology (abdominal infection)   - strict I & O  - renal diet                Thank you for your consult. I will follow-up with patient. Please contact us if you have  any additional questions.    Krish Frausto MD  Nephrology  [facility]

## 2017-11-27 NOTE — ASSESSMENT & PLAN NOTE
Pt is a 22 y/o M with LTx 10/19/17 complicated by klebsiella bacteremia and abdominal wound infection who presents with leukocytosis and pyrexia.    - LAURA on CKD (baseline sCr 3.9 - 4.0). Likely instrinsic etiology, multifactorial secondary to sepsis vs. Toxins. Pt is on many medications which can cause renal injury, including but not limited to atovaquone, valgancyclovir, vanc, cyclosporine.   - Improved UOP 1.7 L/day despite holding lasix. Agree with continuing holding lasix  - Improved renal function with stable electrolytes and sCr peaked at 5.4 now better at 5.0, will need close monitoring of renal function upon dc  - no need for RRT today, will reassess in am  - continue supportive care with fluid hydration and treat underlying etiology (abdominal infection)   - strict I & O  - renal diet

## 2017-11-27 NOTE — CONSULTS
[facility]  Infectious Disease  Consult Note        Inpatient consult to Infectious Diseases  Consult performed by: ROMAN ORNELAS  Consult ordered by: ANTON LIVINGSTON  Reason for consult: IA infection        Assessment/Plan:     No new Assessment & Plan notes have been filed under this hospital service since the last note was generated.  Service: Infectious Diseases    Consult received, full note to follow. Please call with questions    Thanks,   Roman Ornelas MD, PhD  Infectious Disease, PGY-4  [facility]

## 2017-11-27 NOTE — ASSESSMENT & PLAN NOTE
Tmax 11/25 100.2. Leukocytosis resolved   Blood cx prelim no growth.   Urine cx no growth.   CT A/P obtained which showed moderate volume of perihepatic fluid, ascites, and peritoneal edema unchanged when compared to study of 11/20/17, R pleural fluid with compressive atelectasis, retained stool within colon and residual contrast from previous CT scan on 11/20.   -Wound vac removed from chevron incision no evidence of purulence seen.    -.IR drained abdominal fluid and R pleural effusion 11/25. 1700cc removed from right abdomen. 700cc removed from right lung  Cell count from abdomen with 4996 wbc, 74% segs. Thora cell count 587 wbc, 14% segs. Abdominal fluid neg for bile leak. Cultures pending.   Blood cx remain with no growth. Urine cx no growth. No further episodes of fever.   Remains on Cefepime/Vanc. ID consulted, to see patient today. Appreciate their assistance.  -Cont broad spectrum antibiotics.

## 2017-11-27 NOTE — ASSESSMENT & PLAN NOTE
Patient with ongoing ATN since transplant.   Last HD 11/17. Was discharged on 11/22 on lasix 120 mg BID and metolazone 5 mg MWF. Holding diuretics for hyponatremia, infection.  Nephrology consulted for help with management.  Creatinine with improvement today, 5.0 from 5.4 and with increased UOP past 24 hours. Plan to continue to monitor renal recovery. No need for HD currently. Appreciate Nephrology assistance.

## 2017-11-27 NOTE — PROGRESS NOTES
Consulted to see for vac change      11/27/17 1500       Incision/Site 10/30/17 0911 abdomen transverse   Date First Assessed/Time First Assessed: 10/30/17 0911   Present Prior to Hospital Arrival?: Yes  Location: abdomen  Orientation: transverse   Incision WDL ex   Dressing Appearance dry;intact   Appearance moist;pink;granulating   Periwound Area normal skin tone   Drainage Characteristics/Odor serous   Drainage Amount moderate   Wound Length (cm) 6.5   Wound Width (cm) 46.5   Depth (cm) 3   Tunneling (depth (cm)/location) 1.5 @9 o'clock   Wound Edges open   Cleansed W/ sterile normal saline   Irrigated W/ sterile normal saline   Dressing hydrocolloid  (picture framed wound edges)   Therapy Setting continuous therapy;125 mmHg   Pressure Setting 125 mmHg   Negative Pressure Wound Therapy Dressing foam, black   Sponges Inserted 5   Black Sponges Inserted 5   Dressing Change Due 11/30/17     Recommendations:  Will perform vac dressing change twice weekly   Utilizing hydrocolloid to wound edges to prevent suction trauma   Wound vac set at 125mmHg suction  Zoë uMrphy RN CWON  [id number]

## 2017-11-27 NOTE — SUBJECTIVE & OBJECTIVE
Past Medical History:   Diagnosis Date    Alcoholic hepatitis with ascites     Alcoholic hepatitis with ascites     History of hematemesis 9/28/2017    Hypertension     Renal disorder        Past Surgical History:   Procedure Laterality Date    APPENDECTOMY         Review of patient's allergies indicates:   Allergen Reactions    Bactrim [sulfamethoxazole-trimethoprim] Other (See Comments)     Mookie Trell Syndrome       Medications:  Prescriptions Prior to Admission   Medication Sig    aspirin (ECOTRIN) 81 MG EC tablet Take 1 tablet (81 mg total) by mouth once daily.    atovaquone (MEPRON) 750 mg/5 mL Susp Take 10 mLs (1,500 mg total) by mouth once daily. Stop on 4/17/18    cycloSPORINE modified, NEORAL, (NEORAL) 100 MG capsule Take 2 capsules (200 mg total) by mouth 2 (two) times daily. Take with 25mg capsules for total of 250mg PO BID (Patient taking differently: Take 200 mg by mouth 2 (two) times daily. )    cycloSPORINE modified, NEORAL, 25 MG capsule Take 2 capsules (50 mg total) by mouth 2 (two) times daily.    docusate sodium (COLACE) 100 MG capsule Take 100 mg by mouth 3 (three) times daily as needed for Constipation.    ergocalciferol (ERGOCALCIFEROL) 50,000 unit Cap Take 1 capsule (50,000 Units total) by mouth every 7 days.    furosemide (LASIX) 40 MG tablet Take 3 tablets (120 mg total) by mouth 2 (two) times daily.    levETIRAcetam (KEPPRA) 500 MG Tab Take 1 tablet (500 mg total) by mouth 2 (two) times daily.    metOLazone (ZAROXOLYN) 5 MG tablet Take 1 tablet (5 mg total) by mouth 3 (three) times a week. Take on Mon, Wed, Fri 30 minutes before furosemide    multivitamin (THERAGRAN) tablet Take 1 tablet by mouth once daily.    mycophenolate (CELLCEPT) 250 mg Cap Take 2 capsules (500 mg total) by mouth 2 (two) times daily.    ondansetron (ZOFRAN-ODT) 8 MG TbDL Take 1 tablet (8 mg total) by mouth every 8 (eight) hours as needed (nausea).    oxyCODONE-acetaminophen (PERCOCET)  mg  per tablet Take 0.5-1 tablets by mouth every 4 (four) hours as needed for Pain.    pantoprazole (PROTONIX) 40 MG tablet Take 1 tablet (40 mg total) by mouth once daily.    predniSONE (DELTASONE) 10 MG tablet Take 20mg PO QD 10/29-11/4; 15mg PO QD 11/5-11/11; 10mg PO QD 11/12-11/18; 5mg PO QD 11/19-11/25; 2.5mg PO QD 11/26-12/2; stop on 12/2/17    thiamine 100 MG tablet Take 1 tablet (100 mg total) by mouth once daily.    valGANciclovir (VALCYTE) 450 mg Tab Take 1 tablet (450 mg total) by mouth every Mon, Wed, Fri. Stop on 1/17/18     Antibiotics     Start     Stop Route Frequency Ordered    11/25/17 1800  cefepime in dextrose 5 % 1 gram/50 mL IVPB 1 g      -- IV Every 24 hours (non-standard times) 11/24/17 1713        Antifungals     None        Antivirals         Stop Route Frequency     valGANciclovir      -- Oral Every Mon, Wed, Fri           Immunization History   Administered Date(s) Administered    Hepatitis A, Pediatric/Adolescent, 2 Dose 10/11/2017    Hepatitis B, Adult 10/12/2017    Influenza - High Dose 10/12/2017    Pneumococcal Conjugate - 13 Valent 10/12/2017    Tdap 10/11/2017       Family History     None        Social History     Social History    Marital status: Single     Spouse name: N/A    Number of children: N/A    Years of education: N/A     Social History Main Topics    Smoking status: Former Smoker     Types: Cigarettes    Smokeless tobacco: Never Used    Alcohol use No      Comment: a fifth of liquor daily for years, cut back over last 2 months    Drug use: No    Sexual activity: Not Asked     Other Topics Concern    None     Social History Narrative    None     Review of Systems   Constitutional: Positive for fever. Negative for activity change and appetite change.   HENT: Negative for sore throat.    Eyes: Negative for visual disturbance.   Respiratory: Negative for cough, chest tightness and shortness of breath.    Cardiovascular: Negative for palpitations and leg  swelling.   Gastrointestinal: Positive for abdominal pain. Negative for nausea and vomiting.   Endocrine: Negative for polyuria.   Genitourinary: Negative for dysuria and frequency.   Skin: Negative for color change and rash.   Neurological: Negative for dizziness and weakness.   Psychiatric/Behavioral: Negative for agitation and behavioral problems.     Objective:     Vital Signs (Most Recent):  Temp: 98.7 °F (37.1 °C) (11/27/17 0749)  Pulse: 97 (11/27/17 0749)  Resp: 14 (11/27/17 0749)  BP: 119/67 (11/27/17 0749)  SpO2: 98 % (11/27/17 0749) Vital Signs (24h Range):  Temp:  [97.8 °F (36.6 °C)-98.8 °F (37.1 °C)] 98.7 °F (37.1 °C)  Pulse:  [] 97  Resp:  [14-20] 14  SpO2:  [98 %-100 %] 98 %  BP: (114-130)/(60-70) 119/67     Weight: 79.9 kg (176 lb 3.2 oz)  Body mass index is 28.44 kg/m².    Estimated Creatinine Clearance: 21.8 mL/min (based on SCr of 5 mg/dL (H)).    Physical Exam   Constitutional: He is oriented to person, place, and time. He appears well-developed and well-nourished.   HENT:   Head: Normocephalic and atraumatic.   Mouth/Throat: Oropharynx is clear and moist.   Positive sublingual jaundice    Eyes: Pupils are equal, round, and reactive to light. Scleral icterus is present.   Neck: No JVD present.   Cardiovascular: Normal rate and regular rhythm.  Exam reveals no gallop and no friction rub.    No murmur heard.  Pulmonary/Chest: Effort normal and breath sounds normal. No respiratory distress. He has no wheezes. He has no rales.   Abdominal: He exhibits distension. There is tenderness. There is guarding. There is no rebound.   Chevron incision across abdomen with wound vac in place. Some areas of erythema noted around wound edges.    Musculoskeletal: Normal range of motion. He exhibits edema.   Extensive 3-4+ edema noted to legs bilaterally   Lymphadenopathy:     He has no cervical adenopathy.   Neurological: He is alert and oriented to person, place, and time. No cranial nerve deficit.   Skin:  Skin is warm and dry. Capillary refill takes less than 2 seconds.   Psychiatric: He has a normal mood and affect. His behavior is normal.       Significant Labs:   CBC:   Recent Labs  Lab 11/26/17 0423 11/27/17 0621   WBC 7.27 6.46   HGB 7.8* 8.5*   HCT 22.8* 24.4*    382*     CMP:   Recent Labs  Lab 11/26/17 0422 11/27/17 0621   * 127*   K 3.7 3.3*   CL 91* 91*   CO2 18* 22*   GLU 89 73   BUN 40* 42*   CREATININE 5.4* 5.0*   CALCIUM 8.4* 8.2*   PROT 5.1* 4.7*   ALBUMIN 2.4* 2.1*   BILITOT 2.3* 2.2*   ALKPHOS 116 114   AST 13 12   ALT <5* 5*   ANIONGAP 15 14   EGFRNONAA 13.3* 14.6*     Microbiology Results (last 7 days)     Procedure Component Value Units Date/Time    Blood culture #2 **CANNOT BE ORDERED STAT** [[id number]] Collected:  11/24/17 1443    Order Status:  Completed Specimen:  Blood from Peripheral, Hand, Right Updated:  11/26/17 1812     Blood Culture, Routine No Growth to date     Blood Culture, Routine No Growth to date     Blood Culture, Routine No Growth to date    Blood culture #1 **CANNOT BE ORDERED STAT** [[id number]] Collected:  11/24/17 1440    Order Status:  Completed Specimen:  Blood from Peripheral, Hand, Left Updated:  11/26/17 1812     Blood Culture, Routine No Growth to date     Blood Culture, Routine No Growth to date     Blood Culture, Routine No Growth to date    Culture, Body Fluid (Aerobic) w/ GS [[id number]] Collected:  11/25/17 1440    Order Status:  Completed Specimen:  Body Fluid from Pleural Fluid Updated:  11/26/17 0746     AEROBIC CULTURE - FLUID No growth     Gram Stain Result Rare WBC's      No organisms seen    Aerobic culture [[id number]] Collected:  11/25/17 1404    Order Status:  Completed Specimen:  Body Fluid from Abdomen Updated:  11/26/17 0746     Aerobic Bacterial Culture No growth    Urine culture [[id number]] Collected:  11/24/17 1807    Order Status:  Completed Specimen:  Urine from Urine, Clean Catch Updated:  11/25/17 2224     Urine Culture, Routine  No growth    Gram stain [[id number]] Collected:  11/25/17 1404    Order Status:  Completed Specimen:  Body Fluid from Peritoneal Fluid Updated:  11/25/17 1835     Gram Stain Result Rare WBC's      No organisms seen    Culture, Anaerobe [[id number]] Collected:  11/25/17 1404    Order Status:  Sent Specimen:  Body Fluid from Abdomen Updated:  11/25/17 1453    Culture, Anaerobic [[id number]] Collected:  11/25/17 1440    Order Status:  Sent Specimen:  Body Fluid from Pleural Fluid Updated:  11/25/17 1450    Fungus culture [[id number]] Collected:  11/25/17 1440    Order Status:  Sent Specimen:  Body Fluid from Pleural Fluid Updated:  11/25/17 1449    Aerobic culture [[id number]]     Order Status:  Canceled Specimen:  Body Fluid from Abdomen     Culture, Anaerobe [[id number]]     Order Status:  Canceled Specimen:  Body Fluid from Abdomen     Gram stain [[id number]]     Order Status:  Canceled Specimen:  Body Fluid from Pleural Fluid     Gram stain [[id number]]     Order Status:  Canceled Specimen:  Body Fluid           Significant Imaging: I have reviewed all pertinent imaging results/findings within the past 24 hours.

## 2017-11-27 NOTE — PHYSICIAN QUERY
PT Name: Jhonny Diana  MR #: [id number]    Physician Query Form - Cause and Effect Relationship Clarification      CDS/: Estelle Quiroz               Contact information:[email or web address]     This form is a permanent document in the medical record.     Query Date: November 27, 2017    By submitting this query, we are merely seeking further clarification of documentation. Please utilize your independent clinical judgment when addressing the question(s) below.    The Medical record contains the following:  Supporting Clinical Findings   Location in record     Patient was admitted to the hospital with 1 day history of low grade fever, tmax 100.9. He reported having increased abdominal pain, particularly rlq radiating to right flank.                               Physical exam notable body wall edema particularly along R flank and for possible cellulitis around old IRISH drain sites. Sutures were removed with small amount of purulent drainage expressed. He received 1 dose of Vancomycin and then was transitioned to Augmentin for treatment of presumed cellulitis for a total of 10 days, stop date 11/28/17.                                                                                               cellulitis of surgical wound on right flank  Discharge summary 11/21            Discharge summary 11/21              Physician query 11/21                                                                                                                                                                                                       Provider, please clarify if there is any correlation between _fever___ and __cellulitis of surgical wound on right flank ________________.           Are the conditions:     [  x] Due to or associated with each other     [  ] Unrelated to each other     [  ] Other (Please Specify): _________________________     [  ] Clinically Undetermined

## 2017-11-27 NOTE — SUBJECTIVE & OBJECTIVE
Scheduled Meds:   atovaquone  1,500 mg Oral Daily    ceFEPime (MAXIPIME) IVPB  1 g Intravenous Q24H    cycloSPORINE modified (NEORAL)  75 mg Oral BID    docusate sodium  100 mg Oral TID    ergocalciferol  50,000 Units Oral Q7 Days    heparin (porcine)  5,000 Units Subcutaneous Q8H    levETIRAcetam  500 mg Oral BID    multivitamin  1 tablet Oral Daily    pantoprazole  40 mg Oral Daily    polyethylene glycol  17 g Oral Daily    [START ON 11/28/2017] predniSONE  2.5 mg Oral Daily    sodium bicarbonate  1,300 mg Oral TID    thiamine  100 mg Oral Daily    valGANciclovir  450 mg Oral Every Mon, Wed, Fri     Continuous Infusions:   PRN Meds:acetaminophen, diphenhydrAMINE, hydrocodone-acetaminophen 5-325mg, HYDROmorphone, metoclopramide HCl, ondansetron, sodium chloride 0.9%, zolpidem    Review of Systems   Constitutional: Positive for activity change, fatigue and fever. Negative for chills.   Respiratory: Negative for cough, shortness of breath and wheezing.    Cardiovascular: Positive for leg swelling.   Gastrointestinal: Positive for abdominal pain and constipation. Negative for nausea and vomiting.   Genitourinary: Positive for scrotal swelling. Negative for difficulty urinating and dysuria.   Skin: Positive for wound.   Allergic/Immunologic: Positive for immunocompromised state.   Neurological: Positive for weakness. Negative for speech difficulty.   Psychiatric/Behavioral: Negative for confusion and decreased concentration. The patient is nervous/anxious.      Objective:     Vital Signs (Most Recent):  Temp: 98.2 °F (36.8 °C) (11/27/17 1132)  Pulse: 102 (11/27/17 1132)  Resp: 18 (11/27/17 1132)  BP: 119/65 (11/27/17 1132)  SpO2: 100 % (11/27/17 1132) Vital Signs (24h Range):  Temp:  [98.2 °F (36.8 °C)-98.8 °F (37.1 °C)] 98.2 °F (36.8 °C)  Pulse:  [] 102  Resp:  [14-20] 18  SpO2:  [98 %-100 %] 100 %  BP: (114-130)/(60-70) 119/65     Weight: 79.9 kg (176 lb 3.2 oz)  Body mass index is 28.44  kg/m².    Intake/Output - Last 3 Shifts       11/25 0700 - 11/26 0659 11/26 0700 - 11/27 0659 11/27 0700 - 11/28 0659    P.O. 630 980 550    I.V. (mL/kg)       Blood 100      IV Piggyback 50 650     Total Intake(mL/kg) 780 (9.7) 1630 (20.4) 550 (6.9)    Urine (mL/kg/hr) 1030 (0.5) 1770 (0.9) 1060 (1.6)    Emesis/NG output  0 (0)     Other 2400 (1.2) 0 (0)     Stool 0 (0) 0 (0)     Blood  0 (0)     Total Output 3430 1770 1060    Net -2650 -140 -510           Urine Occurrence  0 x     Stool Occurrence 0 x 1 x     Emesis Occurrence  0 x           Physical Exam   Constitutional: He is oriented to person, place, and time. No distress.   Cardiovascular: Regular rhythm.  Tachycardia present.    Pulmonary/Chest: Effort normal. He has decreased breath sounds in the right middle field and the right lower field. He has no wheezes.   Abdominal: Soft. There is tenderness. There is no rebound and no guarding.   Wound vac to incision with surrounding erythema.    Neurological: He is alert and oriented to person, place, and time.   Skin: He is not diaphoretic.   Psychiatric: He has a normal mood and affect. His behavior is normal. Thought content normal.   Nursing note and vitals reviewed.      Laboratory:  Immunosuppressants         Stop Route Frequency     cycloSPORINE modified (NEORAL) capsule 75 mg      -- Oral 2 times daily        CBC:     Recent Labs  Lab 11/27/17  0621   WBC 6.46   RBC 2.76*   HGB 8.5*   HCT 24.4*   *   MCV 88   MCH 30.8   MCHC 34.8     CMP:     Recent Labs  Lab 11/27/17  0621   GLU 73   CALCIUM 8.2*   ALBUMIN 2.1*   PROT 4.7*   *   K 3.3*   CO2 22*   CL 91*   BUN 42*   CREATININE 5.0*   ALKPHOS 114   ALT 5*   AST 12   BILITOT 2.2*     Labs within the past 24 hours have been reviewed.    Diagnostic Results:  US Liver Transplant Post:  No results found for this or any previous visit.

## 2017-11-28 ENCOUNTER — TELEPHONE (OUTPATIENT)
Dept: TRANSPLANT | Facility: CLINIC | Age: 27
End: 2017-11-28

## 2017-11-28 LAB
ALBUMIN SERPL BCP-MCNC: 2.3 G/DL
ALP SERPL-CCNC: 136 U/L
ALT SERPL W/O P-5'-P-CCNC: 5 U/L
ANION GAP SERPL CALC-SCNC: 21 MMOL/L
AST SERPL-CCNC: 18 U/L
BACTERIA FLD AEROBE CULT: NO GROWTH
BACTERIA SPEC AEROBE CULT: NO GROWTH
BASOPHILS # BLD AUTO: 0.06 K/UL
BASOPHILS NFR BLD: 0.7 %
BILIRUB SERPL-MCNC: 2.5 MG/DL
BUN SERPL-MCNC: 43 MG/DL
CALCIUM SERPL-MCNC: 8.8 MG/DL
CHLORIDE SERPL-SCNC: 91 MMOL/L
CO2 SERPL-SCNC: 18 MMOL/L
CREAT SERPL-MCNC: 4.7 MG/DL
CYCLOSPORINE BLD LC/MS/MS-MCNC: 190 NG/ML
DIFFERENTIAL METHOD: ABNORMAL
EOSINOPHIL # BLD AUTO: 0 K/UL
EOSINOPHIL NFR BLD: 0.3 %
ERYTHROCYTE [DISTWIDTH] IN BLOOD BY AUTOMATED COUNT: 16.9 %
EST. GFR  (AFRICAN AMERICAN): 18.2 ML/MIN/1.73 M^2
EST. GFR  (NON AFRICAN AMERICAN): 15.7 ML/MIN/1.73 M^2
GLUCOSE SERPL-MCNC: 72 MG/DL
GRAM STN SPEC: NORMAL
GRAM STN SPEC: NORMAL
HCT VFR BLD AUTO: 29 %
HGB BLD-MCNC: 9.7 G/DL
IMM GRANULOCYTES # BLD AUTO: 0.19 K/UL
IMM GRANULOCYTES NFR BLD AUTO: 2.2 %
LYMPHOCYTES # BLD AUTO: 1.7 K/UL
LYMPHOCYTES NFR BLD: 18.9 %
MAGNESIUM SERPL-MCNC: 2.4 MG/DL
MCH RBC QN AUTO: 29.8 PG
MCHC RBC AUTO-ENTMCNC: 33.4 G/DL
MCV RBC AUTO: 89 FL
MONOCYTES # BLD AUTO: 1.4 K/UL
MONOCYTES NFR BLD: 15.4 %
NEUTROPHILS # BLD AUTO: 5.5 K/UL
NEUTROPHILS NFR BLD: 62.5 %
NRBC BLD-RTO: 0 /100 WBC
PHOSPHATE SERPL-MCNC: 3.8 MG/DL
PLATELET # BLD AUTO: 551 K/UL
PMV BLD AUTO: 9.6 FL
POTASSIUM SERPL-SCNC: 3.9 MMOL/L
PROT SERPL-MCNC: 5.7 G/DL
RBC # BLD AUTO: 3.25 M/UL
SODIUM SERPL-SCNC: 130 MMOL/L
VANCOMYCIN SERPL-MCNC: 15.3 UG/ML
WBC # BLD AUTO: 8.82 K/UL

## 2017-11-28 PROCEDURE — 25000003 PHARM REV CODE 250: Performed by: PEDIATRICS

## 2017-11-28 PROCEDURE — 20600001 HC STEP DOWN PRIVATE ROOM

## 2017-11-28 PROCEDURE — 99233 SBSQ HOSP IP/OBS HIGH 50: CPT | Mod: ,,, | Performed by: INTERNAL MEDICINE

## 2017-11-28 PROCEDURE — 84100 ASSAY OF PHOSPHORUS: CPT

## 2017-11-28 PROCEDURE — 83735 ASSAY OF MAGNESIUM: CPT

## 2017-11-28 PROCEDURE — 63600175 PHARM REV CODE 636 W HCPCS: Performed by: PEDIATRICS

## 2017-11-28 PROCEDURE — 80053 COMPREHEN METABOLIC PANEL: CPT

## 2017-11-28 PROCEDURE — 36415 COLL VENOUS BLD VENIPUNCTURE: CPT

## 2017-11-28 PROCEDURE — 80202 ASSAY OF VANCOMYCIN: CPT

## 2017-11-28 PROCEDURE — 80158 DRUG ASSAY CYCLOSPORINE: CPT

## 2017-11-28 PROCEDURE — 99233 SBSQ HOSP IP/OBS HIGH 50: CPT | Mod: 24,,, | Performed by: PHYSICIAN ASSISTANT

## 2017-11-28 PROCEDURE — 63600175 PHARM REV CODE 636 W HCPCS: Performed by: PHYSICIAN ASSISTANT

## 2017-11-28 PROCEDURE — 85025 COMPLETE CBC W/AUTO DIFF WBC: CPT

## 2017-11-28 PROCEDURE — 25000003 PHARM REV CODE 250: Performed by: PHYSICIAN ASSISTANT

## 2017-11-28 PROCEDURE — 99232 SBSQ HOSP IP/OBS MODERATE 35: CPT | Mod: ,,, | Performed by: INTERNAL MEDICINE

## 2017-11-28 RX ORDER — BISACODYL 10 MG
10 SUPPOSITORY, RECTAL RECTAL ONCE
Status: COMPLETED | OUTPATIENT
Start: 2017-11-28 | End: 2017-11-28

## 2017-11-28 RX ORDER — BISACODYL 10 MG
10 SUPPOSITORY, RECTAL RECTAL ONCE
Status: COMPLETED | OUTPATIENT
Start: 2017-11-28 | End: 2017-11-29

## 2017-11-28 RX ORDER — SYRING-NEEDL,DISP,INSUL,0.3 ML 29 G X1/2"
296 SYRINGE, EMPTY DISPOSABLE MISCELLANEOUS ONCE
Status: COMPLETED | OUTPATIENT
Start: 2017-11-28 | End: 2017-11-28

## 2017-11-28 RX ADMIN — SODIUM BICARBONATE 650 MG TABLET 1300 MG: at 08:11

## 2017-11-28 RX ADMIN — VANCOMYCIN HYDROCHLORIDE 750 MG: 750 INJECTION, POWDER, LYOPHILIZED, FOR SOLUTION INTRAVENOUS at 04:11

## 2017-11-28 RX ADMIN — OXYCODONE HYDROCHLORIDE AND ACETAMINOPHEN 1 TABLET: 10; 325 TABLET ORAL at 11:11

## 2017-11-28 RX ADMIN — POLYETHYLENE GLYCOL 3350 17 G: 17 POWDER, FOR SOLUTION ORAL at 08:11

## 2017-11-28 RX ADMIN — DOCUSATE SODIUM 100 MG: 100 CAPSULE, LIQUID FILLED ORAL at 08:11

## 2017-11-28 RX ADMIN — OXYCODONE HYDROCHLORIDE AND ACETAMINOPHEN 1 TABLET: 10; 325 TABLET ORAL at 05:11

## 2017-11-28 RX ADMIN — ATOVAQUONE 1500 MG: 750 SUSPENSION ORAL at 08:11

## 2017-11-28 RX ADMIN — DOCUSATE SODIUM 100 MG: 100 CAPSULE, LIQUID FILLED ORAL at 06:11

## 2017-11-28 RX ADMIN — MAGESIUM CITRATE 296 ML: 1.75 LIQUID ORAL at 02:11

## 2017-11-28 RX ADMIN — LEVETIRACETAM 500 MG: 500 TABLET, FILM COATED ORAL at 08:11

## 2017-11-28 RX ADMIN — Medication 100 MG: at 08:11

## 2017-11-28 RX ADMIN — OXYCODONE HYDROCHLORIDE AND ACETAMINOPHEN 1 TABLET: 10; 325 TABLET ORAL at 06:11

## 2017-11-28 RX ADMIN — SODIUM BICARBONATE 650 MG TABLET 1300 MG: at 06:11

## 2017-11-28 RX ADMIN — CEFEPIME HYDROCHLORIDE 1 G: 1 INJECTION, SOLUTION INTRAVENOUS at 05:11

## 2017-11-28 RX ADMIN — BISACODYL 10 MG: 10 SUPPOSITORY RECTAL at 02:11

## 2017-11-28 RX ADMIN — PANTOPRAZOLE SODIUM 40 MG: 40 TABLET, DELAYED RELEASE ORAL at 08:11

## 2017-11-28 RX ADMIN — THERA TABS 1 TABLET: TAB at 08:11

## 2017-11-28 RX ADMIN — ONDANSETRON 4 MG: 2 INJECTION INTRAMUSCULAR; INTRAVENOUS at 10:11

## 2017-11-28 RX ADMIN — CYCLOSPORINE 100 MG: 100 CAPSULE, LIQUID FILLED ORAL at 05:11

## 2017-11-28 RX ADMIN — CYCLOSPORINE 75 MG: 25 CAPSULE, LIQUID FILLED ORAL at 08:11

## 2017-11-28 RX ADMIN — PREDNISONE 2.5 MG: 2.5 TABLET ORAL at 08:11

## 2017-11-28 RX ADMIN — HEPARIN SODIUM 5000 UNITS: 5000 INJECTION, SOLUTION INTRAVENOUS; SUBCUTANEOUS at 08:11

## 2017-11-28 RX ADMIN — SODIUM BICARBONATE 650 MG TABLET 1300 MG: at 02:11

## 2017-11-28 RX ADMIN — DOCUSATE SODIUM 100 MG: 100 CAPSULE, LIQUID FILLED ORAL at 02:11

## 2017-11-28 NOTE — PLAN OF CARE
Problem: Patient Care Overview  Goal: Plan of Care Review  Outcome: Ongoing (interventions implemented as appropriate)  Pt VSS-slightly tachycardic, independent with walker, and AAOX4. Pt was lethargic most of shift. Managed abdominal and back pain with Dilaudid 1mg IV and Oxycodone 10mg PO. Wound vac dressing changed by wound care and remains at 125mmHg continuously. Changed perm cath dressing. Administered Heparin injection. Cefepime 1g IV and Mg Sulfate 2g X2 IV were administered. Pt continues to have a decreased appetite. Potassium 20mEq PO was given. Pt was up in chair, call bell within reach, and nonskid socks on. Mother remains at bedside.

## 2017-11-28 NOTE — PROGRESS NOTES
[facility]  Infectious Disease  Progress Note    Patient Name: Jhonny Diana  MRN: [id number]  Admission Date: 11/24/2017  Length of Stay: 4 days  Attending Physician: Alin López Jr., MD  Primary Care Provider: Primary Doctor No    Isolation Status: No active isolations  Assessment/Plan:      * Leukocytosis    Patient with acute bacterial peritonitis. Paracentesis performed 11/25/17 with 4996, predominantly segmented cells. Patient on cefepime for empiric coverage. Cultures have been NGTD. Patient was scheduled for paracentesis today unable to perform due to no adequate pocket available on U/S and distended bowel loops secondary to constipation. Will need the follow up paracentesis to determine if patient is responding to current therapy and determine length of therapy. Will monitor progress.     - Continue cefepime  - Needs follow up paracentesis                 Anticipated Disposition:     Thank you for your consult. I will follow-up with patient. Please contact us if you have any additional questions.    Bernarda Mena MD  Infectious Disease  [facility]    Subjective:     Principal Problem:Leukocytosis    HPI: Pt is a 26 y/o male with a hx of ESLD secondary to EtOH abuse, ESRD and childhood asthma who was initially seen by transplant ID service for evaluation of leucocytosis while pt was being worked up for liver transplant. No localizing source of infection was found during this workup other than an ESBL organism in sputum that was thought to be a colonizer and ID signed off on 10/14/2017. Pt underwent DDLT with a end to end donor common bile duct to recipient common hepatic duct on 10/20/2017. Donation was CMV D+/R+ and pt was started on a immunosuppression regimen of a steroid taper for 6 weeks, Cellcept for 3 months, and Prograf maintenance. OI ppx consisted of Valcyte, dapsone, and nystatin. On 10/29/2017 pt began to have drainage from his chevron incision  so a wound vac was placed. On 11/02/2017 pt had a seizure, was started on Keppra and evaluated by Neurology.Progress note from 11/06/2017 state that pt had tracking along the entire edge of the incision with pus draining from the right side of the wound. Entire chevron incision was opened at that time and covered with wet to dry dressings. Pt discharged on 11/08/2017 with wound vac in place. Pt seen in clinic on 11/14/2017 where he appeared to be doing well, although he continued to have some edema around his incision.     Pt presented back to the ED on 11/17/2017 complaining of feeling bad and fever of 100.9. Pt was admitted for workup of possible infection. Blood and urine cultures negative, CXR negative, liver US did show two small fluid collections and physical exam was positive for signs of cellulitis around the IRISH drain sites. Pt received vancomycin in the ED and was transitioned to amox-clav for total of ten days with a stop date of 11/28/2017. Pt appeared to be doing well and was discharged on 11/21/2017.    Pt returned to the ED on 11/24/2017 secondary to fatigue, fever and abdominal pain. Labs taken earlier that week also showed a leucocytosis. CT scan at admission showed continued presence of fluid collection but in increase in size. Pt also had a small pleural effusion. Pt was started on vancomycin and cefepime. On 11/25/2017 pt underwent US guided para and thoracentesis. Ascites sample positive for bacterial peritonitis   Pt states that since first admission he was iintitally feeling well but has since continued to have abdominal pain, malaise, and decreased appetite.    Previous Positive CUltures  10/16/2017 Resp Cx  Klebsiella pneumoniae, ESBL    Cultures this Admission  11/24/2017 BCx, LH   NGTD  11/24/2017 Bcx, RH   NGTD  11/24/2017 Ucx   No growth  11/25/2017 Aerobic, Pleural No growth, Rare WBC's, NOS  11/25/2017 Anaerobic, pleural In process  11/25/2017 Fungal, Pleural  In  process  11/25/2017 Aerobic, Abdomen No growth  11/25/2017 Anaerobic, Abdomen In process  11/25/2017 Gram, abdomen  Rare Wbc's. NOS    Antibiotics This Admission  Cefepime 1g  11/24-present  Vancomycin 500 mg p HD 11/24-present  atovaquone 1,500 mg daily 11/24-present  Interval History: Patient was scheduled for paracentesis today unable to perform due to no adequate pocket available on U/S and distended bowel loops secondary to constipation    Review of Systems   Constitutional: Negative for chills, fatigue and fever.   HENT: Negative for congestion, mouth sores and sinus pressure.    Respiratory: Negative for shortness of breath.    Gastrointestinal: Positive for abdominal distention and abdominal pain. Negative for diarrhea, nausea and vomiting.   Genitourinary: Negative for dysuria, hematuria and urgency.   Musculoskeletal: Negative for arthralgias and myalgias.   Skin: Negative for rash.     Objective:     Vital Signs (Most Recent):  Temp: 98.2 °F (36.8 °C) (11/28/17 1611)  Pulse: 98 (11/28/17 1611)  Resp: 18 (11/28/17 1611)  BP: 119/67 (11/28/17 1611)  SpO2: 98 % (11/28/17 1611) Vital Signs (24h Range):  Temp:  [98.2 °F (36.8 °C)-98.6 °F (37 °C)] 98.2 °F (36.8 °C)  Pulse:  [] 98  Resp:  [16-18] 18  SpO2:  [98 %-100 %] 98 %  BP: (103-123)/(59-71) 119/67     Weight: 77.4 kg (170 lb 9.6 oz)  Body mass index is 27.54 kg/m².    Estimated Creatinine Clearance: 22.9 mL/min (based on SCr of 4.7 mg/dL (H)).    Physical Exam   Constitutional: He is oriented to person, place, and time. He appears well-developed and well-nourished.   HENT:   Head: Normocephalic and atraumatic.   Eyes: Conjunctivae and EOM are normal. Pupils are equal, round, and reactive to light.   Neck: Normal range of motion. Neck supple.   Cardiovascular: Normal rate, regular rhythm and normal heart sounds.    Pulmonary/Chest: Effort normal and breath sounds normal.   Abdominal: Soft. Bowel sounds are normal. He exhibits distension. There is  tenderness. There is no rebound and no guarding.   Musculoskeletal: Normal range of motion. He exhibits no edema, tenderness or deformity.   Neurological: He is alert and oriented to person, place, and time.   Skin: No rash noted. No erythema.       Significant Labs:   Blood Culture:   Recent Labs  Lab 11/02/17  1800 11/17/17  0636 11/17/17  0657 11/24/17  1440 11/24/17  1443   LABBLOO No growth after 5 days.  No growth after 5 days. No growth after 5 days. No growth after 5 days. No Growth to date  No Growth to date  No Growth to date  No Growth to date No Growth to date  No Growth to date  No Growth to date  No Growth to date     BMP:   Recent Labs  Lab 11/28/17  0437   GLU 72   *   K 3.9   CL 91*   CO2 18*   BUN 43*   CREATININE 4.7*   CALCIUM 8.8   MG 2.4     CBC:   Recent Labs  Lab 11/27/17  0621 11/28/17  0437   WBC 6.46 8.82   HGB 8.5* 9.7*   HCT 24.4* 29.0*   * 551*     Microbiology Results (last 7 days)     Procedure Component Value Units Date/Time    Culture, Anaerobic [[id number]] Collected:  11/25/17 1440    Order Status:  Completed Specimen:  Body Fluid from Pleural Fluid Updated:  11/28/17 1200     Anaerobic Culture Culture in progress    Culture, Anaerobe [[id number]] Collected:  11/25/17 1404    Order Status:  Completed Specimen:  Body Fluid from Abdomen Updated:  11/28/17 1200     Anaerobic Culture Culture in progress    (rule out SBP) Aerobic culture [[id number]]     Order Status:  Canceled Specimen:  Ascites from Abdomen     (rule out SBP) Culture, Anaerobic [[id number]]     Order Status:  Canceled Specimen:  Ascites from Abdomen     (rule out SBP) Gram stain [[id number]]     Order Status:  Canceled Specimen:  Ascites from Abdomen     Fungus culture [[id number]]     Order Status:  Canceled Specimen:  Ascites from Abdomen     Aerobic culture [[id number]] Collected:  11/25/17 1404    Order Status:  Completed Specimen:  Body Fluid from Abdomen Updated:  11/28/17 1046     Aerobic  Bacterial Culture No growth    Culture, Body Fluid (Aerobic) w/ GS [[id number]] Collected:  11/25/17 1440    Order Status:  Completed Specimen:  Body Fluid from Pleural Fluid Updated:  11/28/17 1046     AEROBIC CULTURE - FLUID No growth     Gram Stain Result Rare WBC's      No organisms seen    Fungus culture [[id number]]     Order Status:  Canceled Specimen:  Ascites     (rule out SBP) Aerobic culture [[id number]]     Order Status:  Canceled Specimen:  Ascites     (rule out SBP) Culture, Anaerobic [[id number]]     Order Status:  Canceled Specimen:  Ascites     (rule out SBP) Gram stain [[id number]]     Order Status:  Canceled Specimen:  Ascites     Blood culture #2 **CANNOT BE ORDERED STAT** [[id number]] Collected:  11/24/17 1443    Order Status:  Completed Specimen:  Blood from Peripheral, Hand, Right Updated:  11/27/17 1812     Blood Culture, Routine No Growth to date     Blood Culture, Routine No Growth to date     Blood Culture, Routine No Growth to date     Blood Culture, Routine No Growth to date    Blood culture #1 **CANNOT BE ORDERED STAT** [[id number]] Collected:  11/24/17 1440    Order Status:  Completed Specimen:  Blood from Peripheral, Hand, Left Updated:  11/27/17 1812     Blood Culture, Routine No Growth to date     Blood Culture, Routine No Growth to date     Blood Culture, Routine No Growth to date     Blood Culture, Routine No Growth to date    Urine culture [[id number]] Collected:  11/24/17 1807    Order Status:  Completed Specimen:  Urine from Urine, Clean Catch Updated:  11/25/17 2224     Urine Culture, Routine No growth    Gram stain [[id number]] Collected:  11/25/17 1404    Order Status:  Completed Specimen:  Body Fluid from Peritoneal Fluid Updated:  11/25/17 1835     Gram Stain Result Rare WBC's      No organisms seen    Fungus culture [[id number]] Collected:  11/25/17 1440    Order Status:  Sent Specimen:  Body Fluid from Pleural Fluid Updated:  11/25/17 1449    Aerobic culture [[id number]]     Order  Status:  Canceled Specimen:  Body Fluid from Abdomen     Culture, Anaerobe [[id number]]     Order Status:  Canceled Specimen:  Body Fluid from Abdomen     Gram stain [[id number]]     Order Status:  Canceled Specimen:  Body Fluid from Pleural Fluid     Gram stain [[id number]]     Order Status:  Canceled Specimen:  Body Fluid           Significant Imaging: I have reviewed all pertinent imaging results/findings within the past 24 hours.

## 2017-11-28 NOTE — PROGRESS NOTES
Asked to see for VAC dressing  Pt concerned about vac dressing on left side as it has an opaque appearance. Explained that is the color of the hydrocolloid we applied to protect his skin showing through the drape and nothing for him to be concerned about. Everything is sealed and we plan to change on Thursday as planned.   Zoë Murphy RN CWON  [phone]

## 2017-11-28 NOTE — SUBJECTIVE & OBJECTIVE
Interval History: NAEON,  Feels better, edema improving. C/o poor appetite and some nausea but no emesis. sCr today imroved to 5.0 from 4.7. UO increased to 1.9 lts/24hrs. Net neg 1185 cc/24 hrs.    Review of patient's allergies indicates:   Allergen Reactions    Bactrim [sulfamethoxazole-trimethoprim] Other (See Comments)     Mookie Trell Syndrome     Current Facility-Administered Medications   Medication Frequency    atovaquone suspension 1,500 mg Daily    cefepime in dextrose 5 % 1 gram/50 mL IVPB 1 g Q24H    cycloSPORINE modified (NEORAL) capsule 100 mg BID    diphenhydrAMINE injection 25 mg Q4H PRN    docusate sodium capsule 100 mg TID    ergocalciferol capsule 50,000 Units Q7 Days    heparin (porcine) injection 5,000 Units Q8H    levETIRAcetam tablet 500 mg BID    metoclopramide HCl tablet 10 mg Q6H PRN    multivitamin tablet 1 tablet Daily    ondansetron injection 4 mg Q12H PRN    oxyCODONE-acetaminophen  mg per tablet 1 tablet Q6H PRN    oxyCODONE-acetaminophen 5-325 mg per tablet 1 tablet Q4H PRN    pantoprazole EC tablet 40 mg Daily    polyethylene glycol packet 17 g Daily    predniSONE tablet 2.5 mg Daily    sodium bicarbonate tablet 1,300 mg TID    sodium chloride 0.9% flush 3 mL PRN    thiamine tablet 100 mg Daily    valGANciclovir tablet 450 mg Every Mon, Wed, Fri    vancomycin 750 mg in dextrose 5 % 250 mL IVPB (ready to mix system) Once    zolpidem tablet 5 mg Nightly PRN       Objective:     Vital Signs (Most Recent):  Temp: 98.2 °F (36.8 °C) (11/28/17 1611)  Pulse: 98 (11/28/17 1611)  Resp: 18 (11/28/17 1611)  BP: 119/67 (11/28/17 1611)  SpO2: 98 % (11/28/17 1611)  O2 Device (Oxygen Therapy): room air (11/28/17 1611) Vital Signs (24h Range):  Temp:  [98.2 °F (36.8 °C)-98.6 °F (37 °C)] 98.2 °F (36.8 °C)  Pulse:  [] 98  Resp:  [16-18] 18  SpO2:  [98 %-100 %] 98 %  BP: (103-123)/(59-71) 119/67     Weight: 77.4 kg (170 lb 9.6 oz) (11/28/17 0600)  Body mass index is  27.54 kg/m².  Body surface area is 1.9 meters squared.    I/O last 3 completed shifts:  In: 1340 [P.O.:1290; IV Piggyback:50]  Out: 3365 [Urine:3040; Other:325]    Physical Exam   Constitutional: He is oriented to person, place, and time. He appears well-developed and well-nourished. No distress.   HENT:   Head: Normocephalic and atraumatic.   Eyes: Conjunctivae are normal. Pupils are equal, round, and reactive to light.   Neck: Trachea normal and normal range of motion. Neck supple. No JVD present.   Cardiovascular: Normal rate, regular rhythm, S1 normal, S2 normal, normal heart sounds, intact distal pulses and normal pulses.  Exam reveals no gallop and no friction rub.    No murmur heard.  Pulmonary/Chest: Effort normal and breath sounds normal.   Abdominal: Soft. Bowel sounds are normal. He exhibits no distension. There is tenderness.   Would vac place to incision with surrounding erythema.    Musculoskeletal: Normal range of motion.   Neurological: He is alert and oriented to person, place, and time.   Skin: Skin is warm and dry. Capillary refill takes less than 2 seconds.   Psychiatric: He has a normal mood and affect. His behavior is normal.   Vitals reviewed.      Significant Labs:  CBC:     Recent Labs  Lab 11/28/17  0437   WBC 8.82   RBC 3.25*   HGB 9.7*   HCT 29.0*   *   MCV 89   MCH 29.8   MCHC 33.4     CMP:     Recent Labs  Lab 11/28/17  0437   GLU 72   CALCIUM 8.8   ALBUMIN 2.3*   PROT 5.7*   *   K 3.9   CO2 18*   CL 91*   BUN 43*   CREATININE 4.7*   ALKPHOS 136*   ALT 5*   AST 18   BILITOT 2.5*     Coagulation:     Recent Labs  Lab 11/25/17  0507   INR 1.2     All labs within the past 24 hours have been reviewed.     Significant Imaging:  Labs: Reviewed  X-Ray: Reviewed

## 2017-11-28 NOTE — SUBJECTIVE & OBJECTIVE
Scheduled Meds:   atovaquone  1,500 mg Oral Daily    ceFEPime (MAXIPIME) IVPB  1 g Intravenous Q24H    cycloSPORINE modified (NEORAL)  75 mg Oral BID    docusate sodium  100 mg Oral TID    ergocalciferol  50,000 Units Oral Q7 Days    heparin (porcine)  5,000 Units Subcutaneous Q8H    levETIRAcetam  500 mg Oral BID    magnesium citrate  296 mL Oral Once    multivitamin  1 tablet Oral Daily    pantoprazole  40 mg Oral Daily    polyethylene glycol  17 g Oral Daily    predniSONE  2.5 mg Oral Daily    sodium bicarbonate  1,300 mg Oral TID    thiamine  100 mg Oral Daily    valGANciclovir  450 mg Oral Every Mon, Wed, Fri     Continuous Infusions:   PRN Meds:diphenhydrAMINE, metoclopramide HCl, ondansetron, oxyCODONE-acetaminophen, oxyCODONE-acetaminophen, sodium chloride 0.9%, zolpidem    Review of Systems   Constitutional: Positive for activity change, fatigue and fever. Negative for chills.   Respiratory: Negative for cough, shortness of breath and wheezing.    Cardiovascular: Positive for leg swelling.   Gastrointestinal: Positive for abdominal pain and constipation. Negative for nausea and vomiting.   Genitourinary: Positive for scrotal swelling. Negative for difficulty urinating and dysuria.   Skin: Positive for wound.   Allergic/Immunologic: Positive for immunocompromised state.   Neurological: Positive for weakness. Negative for speech difficulty.   Psychiatric/Behavioral: Negative for confusion and decreased concentration. The patient is nervous/anxious.      Objective:     Vital Signs (Most Recent):  Temp: 98.3 °F (36.8 °C) (11/28/17 1112)  Pulse: 98 (11/28/17 1112)  Resp: 18 (11/28/17 1112)  BP: 116/63 (11/28/17 1112)  SpO2: 100 % (11/28/17 1112) Vital Signs (24h Range):  Temp:  [98.3 °F (36.8 °C)-98.6 °F (37 °C)] 98.3 °F (36.8 °C)  Pulse:  [] 98  Resp:  [16-18] 18  SpO2:  [99 %-100 %] 100 %  BP: (103-123)/(59-71) 116/63     Weight: 77.4 kg (170 lb 9.6 oz)  Body mass index is 27.54  kg/m².    Intake/Output - Last 3 Shifts       11/26 0700 - 11/27 0659 11/27 0700 - 11/28 0659 11/28 0700 - 11/29 0659    P.O. 980 1050 60    Blood       IV Piggyback 650      Total Intake(mL/kg) 1630 (20.4) 1050 (13.6) 60 (0.8)    Urine (mL/kg/hr) 1770 (0.9) 1910 (1) 405 (0.7)    Emesis/NG output 0 (0)      Other 0 (0) 325 (0.2)     Stool 0 (0)      Blood 0 (0)      Total Output 1770 2235 405    Net -140 -1185 -345           Urine Occurrence 0 x      Stool Occurrence 1 x      Emesis Occurrence 0 x            Physical Exam   Constitutional: He is oriented to person, place, and time. No distress.   Cardiovascular: Regular rhythm.  Tachycardia present.    Pulmonary/Chest: Effort normal. He has decreased breath sounds in the right middle field and the right lower field. He has no wheezes.   Abdominal: Soft. There is tenderness. There is no rebound and no guarding.   Wound vac to incision with surrounding erythema.    Neurological: He is alert and oriented to person, place, and time.   Skin: He is not diaphoretic.   Psychiatric: He has a normal mood and affect. His behavior is normal. Thought content normal.   Nursing note and vitals reviewed.      Laboratory:  Immunosuppressants         Stop Route Frequency     cycloSPORINE modified (NEORAL) capsule 75 mg      -- Oral 2 times daily        CBC:     Recent Labs  Lab 11/28/17 0437   WBC 8.82   RBC 3.25*   HGB 9.7*   HCT 29.0*   *   MCV 89   MCH 29.8   MCHC 33.4     CMP:     Recent Labs  Lab 11/28/17 0437   GLU 72   CALCIUM 8.8   ALBUMIN 2.3*   PROT 5.7*   *   K 3.9   CO2 18*   CL 91*   BUN 43*   CREATININE 4.7*   ALKPHOS 136*   ALT 5*   AST 18   BILITOT 2.5*     Labs within the past 24 hours have been reviewed.    Diagnostic Results:  US Liver Transplant Post:  No results found for this or any previous visit.

## 2017-11-28 NOTE — ASSESSMENT & PLAN NOTE
-LFTs stable. Bilirubin remains mildly elevated. Plan for repeat Liver US today. Cont to monitor with daily labs.

## 2017-11-28 NOTE — CONSULTS
[facility]  Infectious Disease  Consult Note    Patient Name: Jhonny Diana  MRN: [id number]  Admission Date: 11/24/2017  Hospital Length of Stay: 3 days  Attending Physician: Alin López Jr., MD  Primary Care Provider: Primary Doctor No     Isolation Status: No active isolations    Patient information was obtained from patient, past medical records and ER records.      Consults  Assessment/Plan:     Abdominal infection    Pt is a 28 y/o male with a hx of ESLD secondary to EtOH abuse, ESRD and childhood asthma who is s/p DDLT with a end to end donor common bile duct to recipient common hepatic duct on 10/20/2017. Donation was CMV D+/R+ and pt was started on a immunosuppression regimen of a steroid taper for 6 weeks, Cellcept for 3 months, and Prograf maintenance. OI ppx consisted of Valcyte, dapsone, and nystatin. recovery has been complicated by surgical wound infection with repeat admissions for antibitoics and wound care. Pt appears to improve at first but then his symptoms return   - pt admitted for abdominal pain and fever   - Tmax for this admission is 101.2, afebrile for the past 24 hours  - abdomen tender on physical exam with guarding and rebound tenderness noted  - initial WBC elevated at 13.3, now 6.5 today. LFT's not elevated   -UA without signs of infection  - all recent cultures are no growth so far.   - CT abdomen on admit shows a moderate volume of perihepatic fluid, abdominal ascites, and peritoneal edema  - pt underwent paracentesis and thoracentesis, 1.7 L were removed with paracentesis, cell counts and diff fromt this preocedure are consistent with bacterial peritonitis  -pt currently on vancomycin and cefepime, can continue this as cultures finalize, but will likely transition to Unasyn vs Augmentin if cultures remain negative  - duration of therapy to be dictated by pt's response. If pt does not respond and cultures remain negative would recommend re-imaging of the abdomen  to investigate for continued or increasing fluid collection.   - will continue to follow         Thank you for your consult. I will follow-up with patient. Please contact us if you have any additional questions.    Roman Jaimes MD, PhD  Infectious Disease, PGY-4  [facility]    Subjective:     Principal Problem: Leukocytosis    HPI: Pt is a 26 y/o male with a hx of ESLD secondary to EtOH abuse, ESRD and childhood asthma who was initially seen by transplant ID service for evaluation of leucocytosis while pt was being worked up for liver transplant. No localizing source of infection was found during this workup other than an ESBL organism in sputum that was thought to be a colonizer and ID signed off on 10/14/2017. Pt underwent DDLT with a end to end donor common bile duct to recipient common hepatic duct on 10/20/2017. Donation was CMV D+/R+ and pt was started on a immunosuppression regimen of a steroid taper for 6 weeks, Cellcept for 3 months, and Prograf maintenance. OI ppx consisted of Valcyte, dapsone, and nystatin. On 10/29/2017 pt began to have drainage from his chevron incision so a wound vac was placed. On 11/02/2017 pt had a seizure, was started on Keppra and evaluated by Neurology.Progress note from 11/06/2017 state that pt had tracking along the entire edge of the incision with pus draining from the right side of the wound. Entire chevron incision was opened at that time and covered with wet to dry dressings. Pt discharged on 11/08/2017 with wound vac in place. Pt seen in clinic on 11/14/2017 where he appeared to be doing well, although he continued to have some edema around his incision.     Pt presented back to the ED on 11/17/2017 complaining of feeling bad and fever of 100.9. Pt was admitted for workup of possible infection. Blood and urine cultures negative, CXR negative, liver US did show two small fluid collections and physical exam was positive for signs of cellulitis around the  IRISH drain sites. Pt received vancomycin in the ED and was transitioned to amox-clav for total of ten days with a stop date of 11/28/2017. Pt appeared to be doing well and was discharged on 11/21/2017.    Pt returned to the ED on 11/24/2017 secondary to fatigue, fever and abdominal pain. Labs taken earlier that week also showed a leucocytosis. CT scan at admission showed continued presence of fluid collection but in increase in size. Pt also had a small pleural effusion. Pt was started on vancomycin and cefepime. On 11/25/2017 pt underwent US guided para and thoracentesis. Ascites sample positive for bacterial peritonitis   Pt states that since first admission he was iintitally feeling well but has since continued to have abdominal pain, malaise, and decreased appetite.    Previous Positive CUltures  10/16/2017 Resp Cx  Klebsiella pneumoniae, ESBL    Cultures this Admission  11/24/2017 BCx, LH   NGTD  11/24/2017 Bcx, RH   NGTD  11/24/2017 Ucx   No growth  11/25/2017 Aerobic, Pleural No growth, Rare WBC's, NOS  11/25/2017 Anaerobic, pleural In process  11/25/2017 Fungal, Pleural  In process  11/25/2017 Aerobic, Abdomen No growth  11/25/2017 Anaerobic, Abdomen In process  11/25/2017 Gram, abdomen  Rare Wbc's. NOS    Antibiotics This Admission  Cefepime 1g  11/24-present  Vancomycin 500 mg p HD 11/24-present  atovaquone 1,500 mg daily 11/24-present    Past Medical History:   Diagnosis Date    Alcoholic hepatitis with ascites     Alcoholic hepatitis with ascites     History of hematemesis 9/28/2017    Hypertension     Renal disorder        Past Surgical History:   Procedure Laterality Date    APPENDECTOMY         Review of patient's allergies indicates:   Allergen Reactions    Bactrim [sulfamethoxazole-trimethoprim] Other (See Comments)     Mookie Trell Syndrome       Medications:  Prescriptions Prior to Admission   Medication Sig    aspirin (ECOTRIN) 81 MG EC tablet Take 1 tablet (81 mg total) by mouth once  daily.    atovaquone (MEPRON) 750 mg/5 mL Susp Take 10 mLs (1,500 mg total) by mouth once daily. Stop on 4/17/18    cycloSPORINE modified, NEORAL, (NEORAL) 100 MG capsule Take 2 capsules (200 mg total) by mouth 2 (two) times daily. Take with 25mg capsules for total of 250mg PO BID (Patient taking differently: Take 200 mg by mouth 2 (two) times daily. )    cycloSPORINE modified, NEORAL, 25 MG capsule Take 2 capsules (50 mg total) by mouth 2 (two) times daily.    docusate sodium (COLACE) 100 MG capsule Take 100 mg by mouth 3 (three) times daily as needed for Constipation.    ergocalciferol (ERGOCALCIFEROL) 50,000 unit Cap Take 1 capsule (50,000 Units total) by mouth every 7 days.    furosemide (LASIX) 40 MG tablet Take 3 tablets (120 mg total) by mouth 2 (two) times daily.    levETIRAcetam (KEPPRA) 500 MG Tab Take 1 tablet (500 mg total) by mouth 2 (two) times daily.    metOLazone (ZAROXOLYN) 5 MG tablet Take 1 tablet (5 mg total) by mouth 3 (three) times a week. Take on Mon, Wed, Fri 30 minutes before furosemide    multivitamin (THERAGRAN) tablet Take 1 tablet by mouth once daily.    mycophenolate (CELLCEPT) 250 mg Cap Take 2 capsules (500 mg total) by mouth 2 (two) times daily.    ondansetron (ZOFRAN-ODT) 8 MG TbDL Take 1 tablet (8 mg total) by mouth every 8 (eight) hours as needed (nausea).    oxyCODONE-acetaminophen (PERCOCET)  mg per tablet Take 0.5-1 tablets by mouth every 4 (four) hours as needed for Pain.    pantoprazole (PROTONIX) 40 MG tablet Take 1 tablet (40 mg total) by mouth once daily.    predniSONE (DELTASONE) 10 MG tablet Take 20mg PO QD 10/29-11/4; 15mg PO QD 11/5-11/11; 10mg PO QD 11/12-11/18; 5mg PO QD 11/19-11/25; 2.5mg PO QD 11/26-12/2; stop on 12/2/17    thiamine 100 MG tablet Take 1 tablet (100 mg total) by mouth once daily.    valGANciclovir (VALCYTE) 450 mg Tab Take 1 tablet (450 mg total) by mouth every Mon, Wed, Fri. Stop on 1/17/18     Antibiotics     Start     Stop  Route Frequency Ordered    11/25/17 1800  cefepime in dextrose 5 % 1 gram/50 mL IVPB 1 g      -- IV Every 24 hours (non-standard times) 11/24/17 1713        Antifungals     None        Antivirals         Stop Route Frequency     valGANciclovir      -- Oral Every Mon, Wed, Fri           Immunization History   Administered Date(s) Administered    Hepatitis A, Pediatric/Adolescent, 2 Dose 10/11/2017    Hepatitis B, Adult 10/12/2017    Influenza - High Dose 10/12/2017    Pneumococcal Conjugate - 13 Valent 10/12/2017    Tdap 10/11/2017       Family History     None        Social History     Social History    Marital status: Single     Spouse name: N/A    Number of children: N/A    Years of education: N/A     Social History Main Topics    Smoking status: Former Smoker     Types: Cigarettes    Smokeless tobacco: Never Used    Alcohol use No      Comment: a fifth of liquor daily for years, cut back over last 2 months    Drug use: No    Sexual activity: Not Asked     Other Topics Concern    None     Social History Narrative    None     Review of Systems   Constitutional: Positive for fever. Negative for activity change and appetite change.   HENT: Negative for sore throat.    Eyes: Negative for visual disturbance.   Respiratory: Negative for cough, chest tightness and shortness of breath.    Cardiovascular: Negative for palpitations and leg swelling.   Gastrointestinal: Positive for abdominal pain. Negative for nausea and vomiting.   Endocrine: Negative for polyuria.   Genitourinary: Negative for dysuria and frequency.   Skin: Negative for color change and rash.   Neurological: Negative for dizziness and weakness.   Psychiatric/Behavioral: Negative for agitation and behavioral problems.     Objective:     Vital Signs (Most Recent):  Temp: 98.7 °F (37.1 °C) (11/27/17 0749)  Pulse: 97 (11/27/17 0749)  Resp: 14 (11/27/17 0749)  BP: 119/67 (11/27/17 0749)  SpO2: 98 % (11/27/17 0749) Vital Signs (24h Range):  Temp:   [97.8 °F (36.6 °C)-98.8 °F (37.1 °C)] 98.7 °F (37.1 °C)  Pulse:  [] 97  Resp:  [14-20] 14  SpO2:  [98 %-100 %] 98 %  BP: (114-130)/(60-70) 119/67     Weight: 79.9 kg (176 lb 3.2 oz)  Body mass index is 28.44 kg/m².    Estimated Creatinine Clearance: 21.8 mL/min (based on SCr of 5 mg/dL (H)).    Physical Exam   Constitutional: He is oriented to person, place, and time. He appears well-developed and well-nourished.   HENT:   Head: Normocephalic and atraumatic.   Mouth/Throat: Oropharynx is clear and moist.   Positive sublingual jaundice    Eyes: Pupils are equal, round, and reactive to light. Scleral icterus is present.   Neck: No JVD present.   Cardiovascular: Normal rate and regular rhythm.  Exam reveals no gallop and no friction rub.    No murmur heard.  Pulmonary/Chest: Effort normal and breath sounds normal. No respiratory distress. He has no wheezes. He has no rales.   Abdominal: He exhibits distension. There is tenderness. There is guarding. There is no rebound.   Chevron incision across abdomen with wound vac in place. Some areas of erythema noted around wound edges.    Musculoskeletal: Normal range of motion. He exhibits edema.   Extensive 3-4+ edema noted to legs bilaterally   Lymphadenopathy:     He has no cervical adenopathy.   Neurological: He is alert and oriented to person, place, and time. No cranial nerve deficit.   Skin: Skin is warm and dry. Capillary refill takes less than 2 seconds.   Psychiatric: He has a normal mood and affect. His behavior is normal.       Significant Labs:   CBC:   Recent Labs  Lab 11/26/17 0423 11/27/17 0621   WBC 7.27 6.46   HGB 7.8* 8.5*   HCT 22.8* 24.4*    382*     CMP:   Recent Labs  Lab 11/26/17 0422 11/27/17 0621   * 127*   K 3.7 3.3*   CL 91* 91*   CO2 18* 22*   GLU 89 73   BUN 40* 42*   CREATININE 5.4* 5.0*   CALCIUM 8.4* 8.2*   PROT 5.1* 4.7*   ALBUMIN 2.4* 2.1*   BILITOT 2.3* 2.2*   ALKPHOS 116 114   AST 13 12   ALT <5* 5*   ANIONGAP 15 14    EGFRNONAA 13.3* 14.6*     Microbiology Results (last 7 days)     Procedure Component Value Units Date/Time    Blood culture #2 **CANNOT BE ORDERED STAT** [[id number]] Collected:  11/24/17 1443    Order Status:  Completed Specimen:  Blood from Peripheral, Hand, Right Updated:  11/26/17 1812     Blood Culture, Routine No Growth to date     Blood Culture, Routine No Growth to date     Blood Culture, Routine No Growth to date    Blood culture #1 **CANNOT BE ORDERED STAT** [[id number]] Collected:  11/24/17 1440    Order Status:  Completed Specimen:  Blood from Peripheral, Hand, Left Updated:  11/26/17 1812     Blood Culture, Routine No Growth to date     Blood Culture, Routine No Growth to date     Blood Culture, Routine No Growth to date    Culture, Body Fluid (Aerobic) w/ GS [[id number]] Collected:  11/25/17 1440    Order Status:  Completed Specimen:  Body Fluid from Pleural Fluid Updated:  11/26/17 0746     AEROBIC CULTURE - FLUID No growth     Gram Stain Result Rare WBC's      No organisms seen    Aerobic culture [[id number]] Collected:  11/25/17 1404    Order Status:  Completed Specimen:  Body Fluid from Abdomen Updated:  11/26/17 0746     Aerobic Bacterial Culture No growth    Urine culture [[id number]] Collected:  11/24/17 1807    Order Status:  Completed Specimen:  Urine from Urine, Clean Catch Updated:  11/25/17 2224     Urine Culture, Routine No growth    Gram stain [[id number]] Collected:  11/25/17 1404    Order Status:  Completed Specimen:  Body Fluid from Peritoneal Fluid Updated:  11/25/17 1835     Gram Stain Result Rare WBC's      No organisms seen    Culture, Anaerobe [[id number]] Collected:  11/25/17 1404    Order Status:  Sent Specimen:  Body Fluid from Abdomen Updated:  11/25/17 1453    Culture, Anaerobic [[id number]] Collected:  11/25/17 1440    Order Status:  Sent Specimen:  Body Fluid from Pleural Fluid Updated:  11/25/17 1450    Fungus culture [[id number]] Collected:  11/25/17 1440    Order Status:  Sent  Specimen:  Body Fluid from Pleural Fluid Updated:  11/25/17 1449    Aerobic culture [[id number]]     Order Status:  Canceled Specimen:  Body Fluid from Abdomen     Culture, Anaerobe [[id number]]     Order Status:  Canceled Specimen:  Body Fluid from Abdomen     Gram stain [[id number]]     Order Status:  Canceled Specimen:  Body Fluid from Pleural Fluid     Gram stain [[id number]]     Order Status:  Canceled Specimen:  Body Fluid           Significant Imaging: I have reviewed all pertinent imaging results/findings within the past 24 hours.

## 2017-11-28 NOTE — PROGRESS NOTES
[facility]  Liver Transplant  Progress Note    Patient Name: Jhonny Diana  MRN: [id number]  Admission Date: 2017  Hospital Length of Stay: 4 days  Code Status: Full Code  Primary Care Provider: Primary Doctor No  Post-Operative Day: 40    ORGAN:   LIVER  Disease Etiology: Acute Alcoholic Hepatitis  Donor Type:    - Brain Death  CDC High Risk:   No  Donor CMV Status:   Donor CMV Status: Positive  Donor HBcAB:   Negative  Donor HCV Status:   Negative  Whole or Partial: Whole Liver  Biliary Anastomosis: End to End  Arterial Anatomy: Standard  Subjective:     History of Present Illness:  Mr Diana  is a 27 y.o. male with hx  ESLD secondary ETOH.  Now s/p oltx 10/19/2017 (Liver), which took place without complication.  Steroid induction, CMV +/+.  Post op complicated by LAURA, seizures, and delayed wound healing requiring wound vac to entire chevron incision. Patient was admitted to the hospital with complaint of fatigue and fever along with R sided abdominal pain, constipation. Lab work notable for leukocytosis as an outpatient, wbc 13. Patient was recently admitted for FUO on . Infectious work up at that time unrevealing except for mild cellulitis around old IRISH drain sites. He was discharged from the hospital on  with Augmentin from presumed cellulitis. Infectious work up again initiated. Blood cx prelim no growth. Urine cx pending. CT A/P obtained which showed moderate volume of perihepatic fluid, ascites, and peritoneal edema unchanged when compared to study of 17, R pleural fluid with compressive atelectasis, retained stool within colon and residual contrast from previous CT scan on . He was placed on broad spectrum antibiotics, Vanc and Cefepime.         Hospital Course:  Interval History: No acute events overnight. Patient feeling well today but remains with abdominal pain and distention.. Afebrile. No leukocytosis. ID consulted and following for peritonitis. Per ID  recs, will plan for repeat para today to assess improvement in cell counts. Will send fluid for cell count, cx, gram stain, bilirubin Cont Cefepime/Vanc while awaiting repeat tap results. Appreciate ID assistance. Bilirubin also remains elevated. Plan for repeat Liver US today. Remains with good UOP and continued renal clearance. Diuretics remain on hold. Nephrology following. Awaiting final recs.  Cont to monitor.              Scheduled Meds:   atovaquone  1,500 mg Oral Daily    ceFEPime (MAXIPIME) IVPB  1 g Intravenous Q24H    cycloSPORINE modified (NEORAL)  75 mg Oral BID    docusate sodium  100 mg Oral TID    ergocalciferol  50,000 Units Oral Q7 Days    heparin (porcine)  5,000 Units Subcutaneous Q8H    levETIRAcetam  500 mg Oral BID    magnesium citrate  296 mL Oral Once    multivitamin  1 tablet Oral Daily    pantoprazole  40 mg Oral Daily    polyethylene glycol  17 g Oral Daily    predniSONE  2.5 mg Oral Daily    sodium bicarbonate  1,300 mg Oral TID    thiamine  100 mg Oral Daily    valGANciclovir  450 mg Oral Every Mon, Wed, Fri     Continuous Infusions:   PRN Meds:diphenhydrAMINE, metoclopramide HCl, ondansetron, oxyCODONE-acetaminophen, oxyCODONE-acetaminophen, sodium chloride 0.9%, zolpidem    Review of Systems   Constitutional: Positive for activity change, fatigue and fever. Negative for chills.   Respiratory: Negative for cough, shortness of breath and wheezing.    Cardiovascular: Positive for leg swelling.   Gastrointestinal: Positive for abdominal pain and constipation. Negative for nausea and vomiting.   Genitourinary: Positive for scrotal swelling. Negative for difficulty urinating and dysuria.   Skin: Positive for wound.   Allergic/Immunologic: Positive for immunocompromised state.   Neurological: Positive for weakness. Negative for speech difficulty.   Psychiatric/Behavioral: Negative for confusion and decreased concentration. The patient is nervous/anxious.      Objective:      Vital Signs (Most Recent):  Temp: 98.3 °F (36.8 °C) (11/28/17 1112)  Pulse: 98 (11/28/17 1112)  Resp: 18 (11/28/17 1112)  BP: 116/63 (11/28/17 1112)  SpO2: 100 % (11/28/17 1112) Vital Signs (24h Range):  Temp:  [98.3 °F (36.8 °C)-98.6 °F (37 °C)] 98.3 °F (36.8 °C)  Pulse:  [] 98  Resp:  [16-18] 18  SpO2:  [99 %-100 %] 100 %  BP: (103-123)/(59-71) 116/63     Weight: 77.4 kg (170 lb 9.6 oz)  Body mass index is 27.54 kg/m².    Intake/Output - Last 3 Shifts       11/26 0700 - 11/27 0659 11/27 0700 - 11/28 0659 11/28 0700 - 11/29 0659    P.O. 980 1050 60    Blood       IV Piggyback 650      Total Intake(mL/kg) 1630 (20.4) 1050 (13.6) 60 (0.8)    Urine (mL/kg/hr) 1770 (0.9) 1910 (1) 405 (0.7)    Emesis/NG output 0 (0)      Other 0 (0) 325 (0.2)     Stool 0 (0)      Blood 0 (0)      Total Output 1770 2235 405    Net -140 -1185 -345           Urine Occurrence 0 x      Stool Occurrence 1 x      Emesis Occurrence 0 x            Physical Exam   Constitutional: He is oriented to person, place, and time. No distress.   Cardiovascular: Regular rhythm.  Tachycardia present.    Pulmonary/Chest: Effort normal. He has decreased breath sounds in the right middle field and the right lower field. He has no wheezes.   Abdominal: Soft. There is tenderness. There is no rebound and no guarding.   Wound vac to incision with surrounding erythema.    Neurological: He is alert and oriented to person, place, and time.   Skin: He is not diaphoretic.   Psychiatric: He has a normal mood and affect. His behavior is normal. Thought content normal.   Nursing note and vitals reviewed.      Laboratory:  Immunosuppressants         Stop Route Frequency     cycloSPORINE modified (NEORAL) capsule 75 mg      -- Oral 2 times daily        CBC:     Recent Labs  Lab 11/28/17 0437   WBC 8.82   RBC 3.25*   HGB 9.7*   HCT 29.0*   *   MCV 89   MCH 29.8   MCHC 33.4     CMP:     Recent Labs  Lab 11/28/17 0437   GLU 72   CALCIUM 8.8   ALBUMIN 2.3*    PROT 5.7*   *   K 3.9   CO2 18*   CL 91*   BUN 43*   CREATININE 4.7*   ALKPHOS 136*   ALT 5*   AST 18   BILITOT 2.5*     Labs within the past 24 hours have been reviewed.    Diagnostic Results:  US Liver Transplant Post:  No results found for this or any previous visit.    Assessment/Plan:     * Leukocytosis    Resolved with abx. See FUO.           Hypomagnesemia    Improved. Monitor with daily labs.           Delayed surgical wound healing    Cont wound vac, replaced 11/27. Wound care consulted. Monitor.           Fever of unknown origin    Tmax 11/25 100.2. Leukocytosis resolved  CT A/P obtained which showed moderate volume of perihepatic fluid, ascites, and peritoneal edema unchanged when compared to study of 11/20/17, R pleural fluid with compressive atelectasis, retained stool within colon and residual contrast from previous CT scan on 11/20.   -Wound vac removed from chevron incision no evidence of purulence seen.    -.IR drained abdominal fluid and R pleural effusion 11/25. 1700cc removed from right abdomen. 700cc removed from right lung  Cell count from abdomen with 4996 wbc, 74% segs. Thora cell count 587 wbc, 14% segs. Abdominal fluid neg for bile leak. Cultures pending.   Blood cx remain with no growth. Urine cx no growth. No further episodes of fever.   Remains with abdominal pain and distention.. ID consulted and following.. Per ID recs, will plan for repeat para today to assess improvement in cell counts. Will send fluid for cell count, cx, gram stain, bilirubin Cont Cefepime/Vanc while awaiting repeat tap results. Appreciate ID assistance.Appreciate their assistance.  -Cont broad spectrum antibiotics.             Long-term use of immunosuppressant medication    Cont cyclosporine. Cont to monitor cyclo level with daily labs, monitor for toxic side effects, and adjust for therapeutic dose.           Prophylactic immunotherapy    See long term use of immunosuppressant medication.           Liver  transplanted    -LFTs stable. Bilirubin remains mildly elevated. Plan for repeat Liver US today. Cont to monitor with daily labs.           Acute renal failure with tubular necrosis    Patient with ongoing ATN since transplant.   Last HD 11/17. Was discharged on 11/22 on lasix 120 mg BID and metolazone 5 mg MWF. Holding diuretics for hyponatremia, infection.  Nephrology consulted for help with management.  Creatinine continues to improve with good UOP. Diuretics remain on hold. Awaiting final recs from Nephrology. Appreciate their assistance.                   VTE Risk Mitigation         Ordered     heparin (porcine) injection 5,000 Units  Every 8 hours     Route:  Subcutaneous        11/25/17 1037     High Risk of VTE  Once      11/24/17 1611          The patients clinical status was discussed at multidisplinary rounds, involving transplant surgery, transplant medicine, pharmacy, nursing, nutrition, and social work    Discharge Planning: Not a candidate for discharge at this time.       Citlali Bridges PA-C  Liver Transplant  [facility]

## 2017-11-28 NOTE — PLAN OF CARE
Problem: Patient Care Overview  Goal: Plan of Care Review  Outcome: Ongoing (interventions implemented as appropriate)    Pt AAO, mother at the bedside. VSS, see flowsheet for assessment details.     Infection control in progress; WBC 6.46(11/27). Repeat Paracentesis(w/ cx) scheduled for the AM of 11/28    Chevron incision currently dressed w/ wound vac Wound vac set to 125mmHg; pt tolerating well, output 325ml(serous)    I/Os monitored. HD PRN. UO 350mls.    Fall precautions in place; pt remains free of injury. Daily labs monitored. NO acute events overnight.

## 2017-11-28 NOTE — PROGRESS NOTES
Insufficient fluid to safely complete paracentesis.  Melissa SHEEHAN updated.  Pt awaits transport.

## 2017-11-28 NOTE — PROGRESS NOTES
No safe pocket to perform paracentesis today. Please see dictation/imaging for further documentation.    Prosper Valerio M.D.  PGY-2 Radiology  Pager# [phone]

## 2017-11-28 NOTE — ASSESSMENT & PLAN NOTE
Patient with acute bacterial peritonitis. Paracentesis performed 11/25/17 with 4996, predominantly segmented cells. Patient on cefepime for empiric coverage. Cultures have been NGTD. Patient was scheduled for paracentesis today unable to perform due to no adequate pocket available on U/S and distended bowel loops secondary to constipation. Will need the follow up paracentesis to determine if patient is responding to current therapy and determine length of therapy. Will monitor progress.     - Continue cefepime  - Needs follow up paracentesis

## 2017-11-28 NOTE — SUBJECTIVE & OBJECTIVE
Interval History: Patient was scheduled for paracentesis today unable to perform due to no adequate pocket available on U/S and distended bowel loops secondary to constipation    Review of Systems   Constitutional: Negative for chills, fatigue and fever.   HENT: Negative for congestion, mouth sores and sinus pressure.    Respiratory: Negative for shortness of breath.    Gastrointestinal: Positive for abdominal distention and abdominal pain. Negative for diarrhea, nausea and vomiting.   Genitourinary: Negative for dysuria, hematuria and urgency.   Musculoskeletal: Negative for arthralgias and myalgias.   Skin: Negative for rash.     Objective:     Vital Signs (Most Recent):  Temp: 98.2 °F (36.8 °C) (11/28/17 1611)  Pulse: 98 (11/28/17 1611)  Resp: 18 (11/28/17 1611)  BP: 119/67 (11/28/17 1611)  SpO2: 98 % (11/28/17 1611) Vital Signs (24h Range):  Temp:  [98.2 °F (36.8 °C)-98.6 °F (37 °C)] 98.2 °F (36.8 °C)  Pulse:  [] 98  Resp:  [16-18] 18  SpO2:  [98 %-100 %] 98 %  BP: (103-123)/(59-71) 119/67     Weight: 77.4 kg (170 lb 9.6 oz)  Body mass index is 27.54 kg/m².    Estimated Creatinine Clearance: 22.9 mL/min (based on SCr of 4.7 mg/dL (H)).    Physical Exam   Constitutional: He is oriented to person, place, and time. He appears well-developed and well-nourished.   HENT:   Head: Normocephalic and atraumatic.   Eyes: Conjunctivae and EOM are normal. Pupils are equal, round, and reactive to light.   Neck: Normal range of motion. Neck supple.   Cardiovascular: Normal rate, regular rhythm and normal heart sounds.    Pulmonary/Chest: Effort normal and breath sounds normal.   Abdominal: Soft. Bowel sounds are normal. He exhibits distension. There is tenderness. There is no rebound and no guarding.   Musculoskeletal: Normal range of motion. He exhibits no edema, tenderness or deformity.   Neurological: He is alert and oriented to person, place, and time.   Skin: No rash noted. No erythema.       Significant Labs:    Blood Culture:   Recent Labs  Lab 11/02/17  1800 11/17/17  0636 11/17/17  0657 11/24/17  1440 11/24/17  1443   LABBLOO No growth after 5 days.  No growth after 5 days. No growth after 5 days. No growth after 5 days. No Growth to date  No Growth to date  No Growth to date  No Growth to date No Growth to date  No Growth to date  No Growth to date  No Growth to date     BMP:   Recent Labs  Lab 11/28/17  0437   GLU 72   *   K 3.9   CL 91*   CO2 18*   BUN 43*   CREATININE 4.7*   CALCIUM 8.8   MG 2.4     CBC:   Recent Labs  Lab 11/27/17  0621 11/28/17  0437   WBC 6.46 8.82   HGB 8.5* 9.7*   HCT 24.4* 29.0*   * 551*     Microbiology Results (last 7 days)     Procedure Component Value Units Date/Time    Culture, Anaerobic [[id number]] Collected:  11/25/17 1440    Order Status:  Completed Specimen:  Body Fluid from Pleural Fluid Updated:  11/28/17 1200     Anaerobic Culture Culture in progress    Culture, Anaerobe [[id number]] Collected:  11/25/17 1404    Order Status:  Completed Specimen:  Body Fluid from Abdomen Updated:  11/28/17 1200     Anaerobic Culture Culture in progress    (rule out SBP) Aerobic culture [[id number]]     Order Status:  Canceled Specimen:  Ascites from Abdomen     (rule out SBP) Culture, Anaerobic [[id number]]     Order Status:  Canceled Specimen:  Ascites from Abdomen     (rule out SBP) Gram stain [[id number]]     Order Status:  Canceled Specimen:  Ascites from Abdomen     Fungus culture [[id number]]     Order Status:  Canceled Specimen:  Ascites from Abdomen     Aerobic culture [[id number]] Collected:  11/25/17 1404    Order Status:  Completed Specimen:  Body Fluid from Abdomen Updated:  11/28/17 1046     Aerobic Bacterial Culture No growth    Culture, Body Fluid (Aerobic) w/ GS [[id number]] Collected:  11/25/17 1440    Order Status:  Completed Specimen:  Body Fluid from Pleural Fluid Updated:  11/28/17 1046     AEROBIC CULTURE - FLUID No growth     Gram Stain Result Rare WBC's      No  organisms seen    Fungus culture [[id number]]     Order Status:  Canceled Specimen:  Ascites     (rule out SBP) Aerobic culture [[id number]]     Order Status:  Canceled Specimen:  Ascites     (rule out SBP) Culture, Anaerobic [[id number]]     Order Status:  Canceled Specimen:  Ascites     (rule out SBP) Gram stain [[id number]]     Order Status:  Canceled Specimen:  Ascites     Blood culture #2 **CANNOT BE ORDERED STAT** [[id number]] Collected:  11/24/17 1443    Order Status:  Completed Specimen:  Blood from Peripheral, Hand, Right Updated:  11/27/17 1812     Blood Culture, Routine No Growth to date     Blood Culture, Routine No Growth to date     Blood Culture, Routine No Growth to date     Blood Culture, Routine No Growth to date    Blood culture #1 **CANNOT BE ORDERED STAT** [[id number]] Collected:  11/24/17 1440    Order Status:  Completed Specimen:  Blood from Peripheral, Hand, Left Updated:  11/27/17 1812     Blood Culture, Routine No Growth to date     Blood Culture, Routine No Growth to date     Blood Culture, Routine No Growth to date     Blood Culture, Routine No Growth to date    Urine culture [[id number]] Collected:  11/24/17 1807    Order Status:  Completed Specimen:  Urine from Urine, Clean Catch Updated:  11/25/17 2224     Urine Culture, Routine No growth    Gram stain [[id number]] Collected:  11/25/17 1404    Order Status:  Completed Specimen:  Body Fluid from Peritoneal Fluid Updated:  11/25/17 1835     Gram Stain Result Rare WBC's      No organisms seen    Fungus culture [[id number]] Collected:  11/25/17 1440    Order Status:  Sent Specimen:  Body Fluid from Pleural Fluid Updated:  11/25/17 1449    Aerobic culture [[id number]]     Order Status:  Canceled Specimen:  Body Fluid from Abdomen     Culture, Anaerobe [[id number]]     Order Status:  Canceled Specimen:  Body Fluid from Abdomen     Gram stain [[id number]]     Order Status:  Canceled Specimen:  Body Fluid from Pleural Fluid     Gram stain [[id number]]      Order Status:  Canceled Specimen:  Body Fluid           Significant Imaging: I have reviewed all pertinent imaging results/findings within the past 24 hours.

## 2017-11-28 NOTE — ASSESSMENT & PLAN NOTE
Tmax 11/25 100.2. Leukocytosis resolved  CT A/P obtained which showed moderate volume of perihepatic fluid, ascites, and peritoneal edema unchanged when compared to study of 11/20/17, R pleural fluid with compressive atelectasis, retained stool within colon and residual contrast from previous CT scan on 11/20.   -Wound vac removed from chevron incision no evidence of purulence seen.    -.IR drained abdominal fluid and R pleural effusion 11/25. 1700cc removed from right abdomen. 700cc removed from right lung  Cell count from abdomen with 4996 wbc, 74% segs. Thora cell count 587 wbc, 14% segs. Abdominal fluid neg for bile leak. Cultures pending.   Blood cx remain with no growth. Urine cx no growth. No further episodes of fever.   Remains with abdominal pain and distention.. ID consulted and following.. Per ID recs, will plan for repeat para today to assess improvement in cell counts. Will send fluid for cell count, cx, gram stain, bilirubin Cont Cefepime/Vanc while awaiting repeat tap results. Appreciate ID assistance.Appreciate their assistance.  -Cont broad spectrum antibiotics.

## 2017-11-28 NOTE — TELEPHONE ENCOUNTER
----- Message from Alisa Jordan sent at 11/28/2017  9:14 AM CST -----  Contact: Pt Mother Deborah Wisdoman would like to know if Mr. Diana letter for his employer is ready for . Deborah called for Isabel.    Please call [phone]    Thanks  Alisa

## 2017-11-28 NOTE — PLAN OF CARE
Problem: Patient Care Overview  Goal: Plan of Care Review  Outcome: Ongoing (interventions implemented as appropriate)  Pt aao x3. Call bell within reach. Mother at bedside throughout day. Wound vac intact draining serous fluid. Plan for paracentesis tomorrow. Will give mg citrate and dulcolax supp today to facilitate bowel emptying. Will continue to monitor.

## 2017-11-29 ENCOUNTER — TELEPHONE (OUTPATIENT)
Dept: TRANSPLANT | Facility: CLINIC | Age: 27
End: 2017-11-29

## 2017-11-29 LAB
ABO + RH BLD: NORMAL
ALBUMIN SERPL BCP-MCNC: 2 G/DL
ALP SERPL-CCNC: 115 U/L
ALT SERPL W/O P-5'-P-CCNC: <5 U/L
ANION GAP SERPL CALC-SCNC: 17 MMOL/L
APPEARANCE FLD: NORMAL
AST SERPL-CCNC: 15 U/L
BACTERIA BLD CULT: NORMAL
BACTERIA BLD CULT: NORMAL
BASOPHILS # BLD AUTO: 0.04 K/UL
BASOPHILS NFR BLD: 0.6 %
BILIRUB FLD-MCNC: 1.4 MG/DL
BILIRUB SERPL-MCNC: 2.2 MG/DL
BLD GP AB SCN CELLS X3 SERPL QL: NORMAL
BLD PROD TYP BPU: NORMAL
BLOOD UNIT EXPIRATION DATE: NORMAL
BLOOD UNIT TYPE CODE: 5100
BLOOD UNIT TYPE: NORMAL
BODY FLD TYPE: NORMAL
BODY FLUID SOURCE, BILIRUBIN: NORMAL
BUN SERPL-MCNC: 42 MG/DL
CALCIUM SERPL-MCNC: 8.4 MG/DL
CHLORIDE SERPL-SCNC: 91 MMOL/L
CO2 SERPL-SCNC: 21 MMOL/L
CODING SYSTEM: NORMAL
COLOR FLD: YELLOW
CREAT SERPL-MCNC: 4.4 MG/DL
CYCLOSPORINE BLD LC/MS/MS-MCNC: 208 NG/ML
DIFFERENTIAL METHOD: ABNORMAL
DISPENSE STATUS: NORMAL
EOSINOPHIL # BLD AUTO: 0 K/UL
EOSINOPHIL NFR BLD: 0.3 %
ERYTHROCYTE [DISTWIDTH] IN BLOOD BY AUTOMATED COUNT: 17.2 %
EST. GFR  (AFRICAN AMERICAN): 19.7 ML/MIN/1.73 M^2
EST. GFR  (NON AFRICAN AMERICAN): 17 ML/MIN/1.73 M^2
GLUCOSE SERPL-MCNC: 83 MG/DL
HAPTOGLOB SERPL-MCNC: 120 MG/DL
HCT VFR BLD AUTO: 22.8 %
HGB BLD-MCNC: 7.8 G/DL
IMM GRANULOCYTES # BLD AUTO: 0.14 K/UL
IMM GRANULOCYTES NFR BLD AUTO: 2.2 %
IRON SERPL-MCNC: 45 UG/DL
LDH SERPL L TO P-CCNC: 291 U/L
LYMPHOCYTES # BLD AUTO: 1.3 K/UL
LYMPHOCYTES NFR BLD: 20.3 %
LYMPHOCYTES NFR FLD MANUAL: 12 %
MAGNESIUM SERPL-MCNC: 2.3 MG/DL
MCH RBC QN AUTO: 29.5 PG
MCHC RBC AUTO-ENTMCNC: 34.2 G/DL
MCV RBC AUTO: 86 FL
MESOTHL CELL NFR FLD MANUAL: 1 %
MONOCYTES # BLD AUTO: 1.2 K/UL
MONOCYTES NFR BLD: 18.6 %
MONOS+MACROS NFR FLD MANUAL: 42 %
NEUTROPHILS # BLD AUTO: 3.6 K/UL
NEUTROPHILS NFR BLD: 58 %
NEUTROPHILS NFR FLD MANUAL: 45 %
NRBC BLD-RTO: 0 /100 WBC
PHOSPHATE SERPL-MCNC: 3.6 MG/DL
PLATELET # BLD AUTO: 427 K/UL
PMV BLD AUTO: 9.5 FL
POTASSIUM SERPL-SCNC: 3.1 MMOL/L
PROT SERPL-MCNC: 4.7 G/DL
RBC # BLD AUTO: 2.64 M/UL
RETICS/RBC NFR AUTO: 1.7 %
SATURATED IRON: 52 %
SODIUM SERPL-SCNC: 129 MMOL/L
TOTAL IRON BINDING CAPACITY: 87 UG/DL
TRANS ERYTHROCYTES VOL PATIENT: NORMAL ML
TRANSFERRIN SERPL-MCNC: 59 MG/DL
VANCOMYCIN SERPL-MCNC: 22.8 UG/ML
WBC # BLD AUTO: 6.29 K/UL
WBC # FLD: 1479 /CU MM

## 2017-11-29 PROCEDURE — 99233 SBSQ HOSP IP/OBS HIGH 50: CPT | Mod: 24,,, | Performed by: PHYSICIAN ASSISTANT

## 2017-11-29 PROCEDURE — 25000003 PHARM REV CODE 250: Performed by: NURSE PRACTITIONER

## 2017-11-29 PROCEDURE — 20600001 HC STEP DOWN PRIVATE ROOM

## 2017-11-29 PROCEDURE — 25000003 PHARM REV CODE 250: Performed by: PHYSICIAN ASSISTANT

## 2017-11-29 PROCEDURE — 36430 TRANSFUSION BLD/BLD COMPNT: CPT

## 2017-11-29 PROCEDURE — 87075 CULTR BACTERIA EXCEPT BLOOD: CPT

## 2017-11-29 PROCEDURE — 99233 SBSQ HOSP IP/OBS HIGH 50: CPT | Mod: ,,, | Performed by: INTERNAL MEDICINE

## 2017-11-29 PROCEDURE — 82247 BILIRUBIN TOTAL: CPT

## 2017-11-29 PROCEDURE — 86920 COMPATIBILITY TEST SPIN: CPT

## 2017-11-29 PROCEDURE — 83540 ASSAY OF IRON: CPT

## 2017-11-29 PROCEDURE — 87205 SMEAR GRAM STAIN: CPT

## 2017-11-29 PROCEDURE — 89051 BODY FLUID CELL COUNT: CPT

## 2017-11-29 PROCEDURE — 85045 AUTOMATED RETICULOCYTE COUNT: CPT

## 2017-11-29 PROCEDURE — 63600175 PHARM REV CODE 636 W HCPCS: Performed by: PHYSICIAN ASSISTANT

## 2017-11-29 PROCEDURE — 84100 ASSAY OF PHOSPHORUS: CPT

## 2017-11-29 PROCEDURE — 86900 BLOOD TYPING SEROLOGIC ABO: CPT

## 2017-11-29 PROCEDURE — 36415 COLL VENOUS BLD VENIPUNCTURE: CPT

## 2017-11-29 PROCEDURE — 86901 BLOOD TYPING SEROLOGIC RH(D): CPT

## 2017-11-29 PROCEDURE — 80053 COMPREHEN METABOLIC PANEL: CPT

## 2017-11-29 PROCEDURE — 87102 FUNGUS ISOLATION CULTURE: CPT

## 2017-11-29 PROCEDURE — 87070 CULTURE OTHR SPECIMN AEROBIC: CPT

## 2017-11-29 PROCEDURE — P9021 RED BLOOD CELLS UNIT: HCPCS

## 2017-11-29 PROCEDURE — 0W9G3ZZ DRAINAGE OF PERITONEAL CAVITY, PERCUTANEOUS APPROACH: ICD-10-PCS | Performed by: RADIOLOGY

## 2017-11-29 PROCEDURE — 83735 ASSAY OF MAGNESIUM: CPT

## 2017-11-29 PROCEDURE — 85025 COMPLETE CBC W/AUTO DIFF WBC: CPT

## 2017-11-29 PROCEDURE — 83615 LACTATE (LD) (LDH) ENZYME: CPT

## 2017-11-29 PROCEDURE — 83010 ASSAY OF HAPTOGLOBIN QUANT: CPT

## 2017-11-29 PROCEDURE — 80158 DRUG ASSAY CYCLOSPORINE: CPT

## 2017-11-29 PROCEDURE — 80202 ASSAY OF VANCOMYCIN: CPT

## 2017-11-29 PROCEDURE — 63600175 PHARM REV CODE 636 W HCPCS: Performed by: PEDIATRICS

## 2017-11-29 PROCEDURE — 25000003 PHARM REV CODE 250: Performed by: PEDIATRICS

## 2017-11-29 RX ORDER — HEPARIN SODIUM 1000 [USP'U]/ML
3000 INJECTION, SOLUTION INTRAVENOUS; SUBCUTANEOUS
Status: DISCONTINUED | OUTPATIENT
Start: 2017-11-29 | End: 2017-11-30 | Stop reason: HOSPADM

## 2017-11-29 RX ORDER — POTASSIUM CHLORIDE 750 MG/1
40 CAPSULE, EXTENDED RELEASE ORAL ONCE
Status: COMPLETED | OUTPATIENT
Start: 2017-11-29 | End: 2017-11-29

## 2017-11-29 RX ORDER — HYDROCODONE BITARTRATE AND ACETAMINOPHEN 500; 5 MG/1; MG/1
TABLET ORAL
Status: DISCONTINUED | OUTPATIENT
Start: 2017-11-29 | End: 2017-11-30

## 2017-11-29 RX ADMIN — POLYETHYLENE GLYCOL 3350 17 G: 17 POWDER, FOR SOLUTION ORAL at 08:11

## 2017-11-29 RX ADMIN — OXYCODONE HYDROCHLORIDE AND ACETAMINOPHEN 1 TABLET: 10; 325 TABLET ORAL at 12:11

## 2017-11-29 RX ADMIN — THERA TABS 1 TABLET: TAB at 08:11

## 2017-11-29 RX ADMIN — LEVETIRACETAM 500 MG: 500 TABLET, FILM COATED ORAL at 08:11

## 2017-11-29 RX ADMIN — CEFEPIME HYDROCHLORIDE 1 G: 1 INJECTION, SOLUTION INTRAVENOUS at 05:11

## 2017-11-29 RX ADMIN — SODIUM BICARBONATE 650 MG TABLET 1300 MG: at 05:11

## 2017-11-29 RX ADMIN — HEPARIN SODIUM 5000 UNITS: 5000 INJECTION, SOLUTION INTRAVENOUS; SUBCUTANEOUS at 08:11

## 2017-11-29 RX ADMIN — POTASSIUM CHLORIDE 40 MEQ: 750 CAPSULE, EXTENDED RELEASE ORAL at 08:11

## 2017-11-29 RX ADMIN — ATOVAQUONE 1500 MG: 750 SUSPENSION ORAL at 08:11

## 2017-11-29 RX ADMIN — DOCUSATE SODIUM 100 MG: 100 CAPSULE, LIQUID FILLED ORAL at 08:11

## 2017-11-29 RX ADMIN — PANTOPRAZOLE SODIUM 40 MG: 40 TABLET, DELAYED RELEASE ORAL at 08:11

## 2017-11-29 RX ADMIN — OXYCODONE HYDROCHLORIDE AND ACETAMINOPHEN 1 TABLET: 10; 325 TABLET ORAL at 09:11

## 2017-11-29 RX ADMIN — PREDNISONE 2.5 MG: 2.5 TABLET ORAL at 08:11

## 2017-11-29 RX ADMIN — OXYCODONE HYDROCHLORIDE AND ACETAMINOPHEN 1 TABLET: 10; 325 TABLET ORAL at 04:11

## 2017-11-29 RX ADMIN — BISACODYL 10 MG: 10 SUPPOSITORY RECTAL at 12:11

## 2017-11-29 RX ADMIN — VALGANCICLOVIR 450 MG: 450 TABLET, FILM COATED ORAL at 05:11

## 2017-11-29 RX ADMIN — SODIUM BICARBONATE 650 MG TABLET 1300 MG: at 01:11

## 2017-11-29 RX ADMIN — CYCLOSPORINE 100 MG: 100 CAPSULE, LIQUID FILLED ORAL at 05:11

## 2017-11-29 RX ADMIN — DOCUSATE SODIUM 100 MG: 100 CAPSULE, LIQUID FILLED ORAL at 05:11

## 2017-11-29 RX ADMIN — Medication 100 MG: at 08:11

## 2017-11-29 RX ADMIN — SODIUM BICARBONATE 650 MG TABLET 1300 MG: at 08:11

## 2017-11-29 RX ADMIN — ERYTHROPOIETIN 3900 UNITS: 10000 INJECTION, SOLUTION INTRAVENOUS; SUBCUTANEOUS at 01:11

## 2017-11-29 RX ADMIN — DOCUSATE SODIUM 100 MG: 100 CAPSULE, LIQUID FILLED ORAL at 01:11

## 2017-11-29 RX ADMIN — HEPARIN SODIUM 1800 UNITS: 1000 INJECTION, SOLUTION INTRAVENOUS; SUBCUTANEOUS at 06:11

## 2017-11-29 RX ADMIN — OXYCODONE HYDROCHLORIDE AND ACETAMINOPHEN 1 TABLET: 10; 325 TABLET ORAL at 10:11

## 2017-11-29 RX ADMIN — CYCLOSPORINE 100 MG: 100 CAPSULE, LIQUID FILLED ORAL at 08:11

## 2017-11-29 NOTE — PLAN OF CARE
Problem: Patient Care Overview  Goal: Plan of Care Review  Outcome: Ongoing (interventions implemented as appropriate)  Pt AAO, mother at the bedside. VSS, see flowsheet for assessment details.      Infection control in progress; WBC 8.82(11/28). Repeat Paracentesis(w/ cx) scheduled for the AM of 11/29(per MD notes, ID following). Para originally scheduled for 11/28 but was postponed due to distended bowels; Mg Citrate and suppository given.      Chevron incision currently dressed w/ wound vac Wound vac set to 125mmHg; pt tolerating well, output 50ml(serous)     I/Os monitored. HD PRN. UO 80mls(+ 2UM and 3 BM).     Fall precautions in place; pt remains free of injury. Daily labs monitored. NO acute events overnight.

## 2017-11-29 NOTE — PHYSICIAN QUERY
PT Name: Jhonny Diana  MR #: [id number]     Physician Query Form - Diagnosis Clarification      CDS/: Roxanna Deal RN, CCDS              Contact information: [email or web address]    This form is a permanent document in the medical record.     Query Date: November 29, 2017    By submitting this query, we are merely seeking further clarification of documentation.  Please utilize your independent clinical judgment when addressing the question(s) below.     The medical record contains the following:      Findings Supporting Clinical Information Location in Medical Record   has presented with pyrexia post transplant. He is tachycardic indication sepsis.   If he shows no signs of improvement with continuing sepsis, we shall consider IR guided drainage of the abdominal collection.                  Fever of unknown origin     Tmax 11/25 100.2. Leukocytosis resolved                      Wbc 13.30  Lactate 0.5   Blood culture ngtd  ua culture ngtd     placed on broad spectrum antibiotics, Vanc and Cefepime, on admission 11/24 h/p                11/24 lab          11/25 progress note       11/29 progress note               Please clarify if the ____Sepsis_______ diagnosis has been:    [  ] Ruled In  [  ] Ruled In, Now Resolved  [ X ] Ruled Out  [  ] Clinically insignificant  [  ] Clinically undetermined  [  ] Other/Clarification of findings (please specify)_______________________________    Please document in your progress notes daily for the duration of treatment, until resolved, and include in your discharge summary.

## 2017-11-29 NOTE — PHYSICIAN QUERY
PT Name: Jhonny Diana  MR #: [id number]  Physician Query Form - CKD Clarification     CDS/: Roxanna Deal RN, CCDS             Contact information: [email or web address]  This form is a permanent document in the medical record.     Query Date: November 29, 2017    By submitting this query, we are merely seeking further clarification of documentation. Please utilize your independent clinical judgment when addressing the question(s) below.    The Medical record contains the following:     Indicators   Supporting Clinical Findings   Location in Medical Record   X CKD or Chronic Kidney (Renal) Failure / Disease  Acute renal failure with tubular necrosis  No signs of uremia. Non oliguric. No indication for HD.LAURA on CKD (baseline sCr 3.9 - 4.0). Likely instrinsic etiology, multifactorial secondary to sepsis vs. Toxins. 11/27, 11/28 nephro note    X BUN/Creatinine                          GFR Cr4.8-->4.9->5.4->5,0-  >4.7->4.4  gfr 15.3->14.9->13.3->  14.6->15.7 11/24- 11/29 lab    Dehydration      Nausea / Vomiting      Dialysis / CRRT      Medication      Treatment      Other Chronic Conditions     X Other No signs of uremia. Non oliguric. No indication for HD. 11/28     Provider, please further specify the stage of CKD.      [  ] Chronic Kidney Disease (CKD) (please specify stage* below)       [employer] Definitions  Stage Description  eGFR (mL/min)   [  ]     I Slight kidney damage with normal or increased filtration 90+   [  ]     II Mildly reduced kidney function 60-89   [  ]     III Moderately reduced kidney function 30-59   [  ]     IV Severely reduced kidney function 15-29   [  ]     V Kidney failure, requiring transplant or dialysis <15       [  ] Other (please specify): ________________________  [ x ] Clinically Undetermined    Please document in your progress notes daily for the duration of treatment until resolved and include in your discharge summary.

## 2017-11-29 NOTE — PROCEDURES
Radiology Post-Procedure Note    Pre Op Diagnosis: Ascites  Post Op Diagnosis: Same    Procedure: Paracentesis    Procedure performed by: Jhonny Valdes MD     Following informed consent, the patient underwent sterile preparation and 1% lidocaine for local anesthesia. Ultrasound guidance was used to insert a micropuncture needle into the peritoneal space. 20cc's of yellow-colored fluid was successfully removed with no complications.    Written Informed Consent Obtained: Yes  Specimen Removed: YES; approximately 20cc's of ascites obtain for laboratory analysis  Estimated Blood Loss: Minimal    Findings:   Successful paracentesis.    Patient tolerated procedure well.    Prosper Valerio M.D.  PGY-2 Radiology  Pager# [phone]

## 2017-11-29 NOTE — SUBJECTIVE & OBJECTIVE
Interval History: Pain a bit better since small BM yesterday. Afebrile. Awaiting para attempt again.    Review of Systems   Constitutional: Negative for chills, fatigue and fever.   HENT: Negative for congestion, mouth sores and sinus pressure.    Respiratory: Negative for shortness of breath.    Gastrointestinal: Positive for abdominal distention and abdominal pain. Negative for diarrhea, nausea and vomiting.   Genitourinary: Negative for dysuria, hematuria and urgency.   Musculoskeletal: Negative for arthralgias and myalgias.   Skin: Negative for rash.     Objective:     Vital Signs (Most Recent):  Temp: 98.5 °F (36.9 °C) (11/29/17 1508)  Pulse: 91 (11/29/17 1508)  Resp: 18 (11/29/17 1508)  BP: 114/62 (11/29/17 1508)  SpO2: 99 % (11/29/17 1508) Vital Signs (24h Range):  Temp:  [97.8 °F (36.6 °C)-98.9 °F (37.2 °C)] 98.5 °F (36.9 °C)  Pulse:  [] 91  Resp:  [16-18] 18  SpO2:  [95 %-100 %] 99 %  BP: (108-123)/(56-68) 114/62     Weight: 77.4 kg (170 lb 9.6 oz)  Body mass index is 27.54 kg/m².    Estimated Creatinine Clearance: 24.5 mL/min (based on SCr of 4.4 mg/dL (H)).    Physical Exam   Constitutional: He is oriented to person, place, and time. He appears well-developed and well-nourished.   HENT:   Head: Normocephalic and atraumatic.   Eyes: Conjunctivae and EOM are normal. Pupils are equal, round, and reactive to light.   Neck: Normal range of motion. Neck supple.   Cardiovascular: Normal rate, regular rhythm and normal heart sounds.    Pulmonary/Chest: Effort normal and breath sounds normal.   Abdominal: Soft. Bowel sounds are normal. He exhibits distension. There is tenderness. There is no rebound and no guarding.   Musculoskeletal: Normal range of motion. He exhibits no edema, tenderness or deformity.   Neurological: He is alert and oriented to person, place, and time.   Skin: No rash noted. No erythema.       Significant Labs:   CBC:   Recent Labs  Lab 11/28/17  0437 11/29/17  0451   WBC 8.82 6.29   HGB  9.7* 7.8*   HCT 29.0* 22.8*   * 427*     CMP:   Recent Labs  Lab 11/28/17  0437 11/29/17  0451   * 129*   K 3.9 3.1*   CL 91* 91*   CO2 18* 21*   GLU 72 83   BUN 43* 42*   CREATININE 4.7* 4.4*   CALCIUM 8.8 8.4*   PROT 5.7* 4.7*   ALBUMIN 2.3* 2.0*   BILITOT 2.5* 2.2*   ALKPHOS 136* 115   AST 18 15   ALT 5* <5*   ANIONGAP 21* 17*   EGFRNONAA 15.7* 17.0*       Significant Imaging: I have reviewed all pertinent imaging results/findings within the past 24 hours.

## 2017-11-29 NOTE — PROGRESS NOTES
Diagnostic paracentesis complete, dressing applied to LLQ puncture site, CDI. Specimen sent to lab, 20 mls removed. No acute events. Pt resting comfortably, denies pain/discomfort. Pt to Lobby to await transport. Report called to SISSY Torres.

## 2017-11-29 NOTE — SUBJECTIVE & OBJECTIVE
Scheduled Meds:   atovaquone  1,500 mg Oral Daily    ceFEPime (MAXIPIME) IVPB  1 g Intravenous Q24H    cycloSPORINE modified (NEORAL)  100 mg Oral BID    docusate sodium  100 mg Oral TID    epoetin maurisio  50 Units/kg Intravenous Every Mon, Wed, Fri    ergocalciferol  50,000 Units Oral Q7 Days    heparin (porcine)  5,000 Units Subcutaneous Q8H    levETIRAcetam  500 mg Oral BID    multivitamin  1 tablet Oral Daily    pantoprazole  40 mg Oral Daily    polyethylene glycol  17 g Oral Daily    predniSONE  2.5 mg Oral Daily    sodium bicarbonate  1,300 mg Oral TID    thiamine  100 mg Oral Daily    valGANciclovir  450 mg Oral Every Mon, Wed, Fri     Continuous Infusions:   PRN Meds:sodium chloride, diphenhydrAMINE, metoclopramide HCl, ondansetron, oxyCODONE-acetaminophen, oxyCODONE-acetaminophen, sodium chloride 0.9%, zolpidem    Review of Systems   Constitutional: Positive for activity change, fatigue and fever. Negative for chills.   Respiratory: Negative for cough, shortness of breath and wheezing.    Cardiovascular: Positive for leg swelling.   Gastrointestinal: Positive for abdominal pain and constipation. Negative for nausea and vomiting.   Genitourinary: Positive for scrotal swelling. Negative for difficulty urinating and dysuria.   Skin: Positive for wound.   Allergic/Immunologic: Positive for immunocompromised state.   Neurological: Positive for weakness. Negative for speech difficulty.   Psychiatric/Behavioral: Negative for confusion and decreased concentration. The patient is nervous/anxious.      Objective:     Vital Signs (Most Recent):  Temp: 98.2 °F (36.8 °C) (11/29/17 0751)  Pulse: 94 (11/29/17 0751)  Resp: 16 (11/29/17 0751)  BP: 120/68 (11/29/17 0751)  SpO2: 99 % (11/29/17 0751) Vital Signs (24h Range):  Temp:  [98.2 °F (36.8 °C)-98.9 °F (37.2 °C)] 98.2 °F (36.8 °C)  Pulse:  [] 94  Resp:  [16-18] 16  SpO2:  [98 %-100 %] 99 %  BP: (116-123)/(63-68) 120/68     Weight: 77.4 kg (170 lb 9.6  oz)  Body mass index is 27.54 kg/m².    Intake/Output - Last 3 Shifts       11/27 0700 - 11/28 0659 11/28 0700 - 11/29 0659 11/29 0700 - 11/30 0659    P.O. 1050 780     IV Piggyback  350     Total Intake(mL/kg) 1050 (13.6) 1130 (14.6)     Urine (mL/kg/hr) 1910 (1) 485 (0.3) 450 (1.4)    Emesis/NG output       Other 325 (0.2)      Stool  0 (0)     Blood       Total Output 2235 485 450    Net -1185 +645 -450           Urine Occurrence  2 x     Stool Occurrence  3 x           Physical Exam   Constitutional: He is oriented to person, place, and time. No distress.   Cardiovascular: Regular rhythm.  Tachycardia present.    Pulmonary/Chest: Effort normal. He has decreased breath sounds in the right middle field and the right lower field. He has no wheezes.   Abdominal: Soft. There is tenderness. There is no rebound and no guarding.   Wound vac to incision with surrounding erythema.    Neurological: He is alert and oriented to person, place, and time.   Skin: He is not diaphoretic.   Psychiatric: He has a normal mood and affect. His behavior is normal. Thought content normal.   Nursing note and vitals reviewed.      Laboratory:  Immunosuppressants         Stop Route Frequency     cycloSPORINE modified (NEORAL) capsule 100 mg      -- Oral 2 times daily        CBC:     Recent Labs  Lab 11/29/17  0451   WBC 6.29   RBC 2.64*   HGB 7.8*   HCT 22.8*   *   MCV 86   MCH 29.5   MCHC 34.2     CMP:     Recent Labs  Lab 11/29/17  0451   GLU 83   CALCIUM 8.4*   ALBUMIN 2.0*   PROT 4.7*   *   K 3.1*   CO2 21*   CL 91*   BUN 42*   CREATININE 4.4*   ALKPHOS 115   ALT <5*   AST 15   BILITOT 2.2*     Labs within the past 24 hours have been reviewed.    Diagnostic Results:  US Liver Transplant Post:  No results found for this or any previous visit.

## 2017-11-29 NOTE — ASSESSMENT & PLAN NOTE
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, and recent admit for fevers on 11/21 discharged on empiric augmentin for suspected superficial wound infection) who was admitted on 11/24/2017 with progressive fatigue, anorexia, R>L abdominal pain, and acute onset fevers/chills due to peritonitis (s/p paracentesis with drainage of 1.7L fluid, WBC ~5000 with ~75% PMN's; cx NGTD on vanc/cefepime). He is modestly improved with regards to fever curve/leukocytosis on empiric coverage after drainage but notes ongoing painful abdominal distension (uncertain if due to ongoing uncontrolled infection or constipation/chronic pain).      - would continue vanc/cefepime for now for post-operative peritonitis   - await repeat paracentesis to assess whether inflammation is improved (cell counts will be most helpful)

## 2017-11-29 NOTE — PROGRESS NOTES
[facility]  Liver Transplant  Progress Note    Patient Name: Jhonny Diana  MRN: [id number]  Admission Date: 2017  Hospital Length of Stay: 5 days  Code Status: Full Code  Primary Care Provider: Primary Doctor No  Post-Operative Day: 41    ORGAN:   LIVER  Disease Etiology: Acute Alcoholic Hepatitis  Donor Type:    - Brain Death  CDC High Risk:   No  Donor CMV Status:   Donor CMV Status: Positive  Donor HBcAB:   Negative  Donor HCV Status:   Negative  Whole or Partial: Whole Liver  Biliary Anastomosis: End to End  Arterial Anatomy: Standard  Subjective:     History of Present Illness:  Mr Diana  is a 27 y.o. male with hx  ESLD secondary ETOH.  Now s/p oltx 10/19/2017 (Liver), which took place without complication.  Steroid induction, CMV +/+.  Post op complicated by LAURA, seizures, and delayed wound healing requiring wound vac to entire chevron incision. Patient was admitted to the hospital with complaint of fatigue and fever along with R sided abdominal pain, constipation. Lab work notable for leukocytosis as an outpatient, wbc 13. Patient was recently admitted for FUO on . Infectious work up at that time unrevealing except for mild cellulitis around old IRISH drain sites. He was discharged from the hospital on  with Augmentin from presumed cellulitis. Infectious work up again initiated. Blood cx prelim no growth. Urine cx pending. CT A/P obtained which showed moderate volume of perihepatic fluid, ascites, and peritoneal edema unchanged when compared to study of 17, R pleural fluid with compressive atelectasis, retained stool within colon and residual contrast from previous CT scan on . He was placed on broad spectrum antibiotics, Vanc and Cefepime.         Hospital Course:  Interval History: No acute events overnight. Patient feeling better today with some improvement in abdominal pain. Edema also improving. IR unable to perform paracentesis yesterday as dilated bowel  impeded access to fluid. Patient give mag citrate with 3 large BM yesterday evening. Therefore, will have asked IR to re attempt paracentesis today. Will continue Vanc/Cefepime for peritonitis while awaiting cell count results from tap per ID recs. Appreciate ID assistance. Creatinine continue to improve with no need for HD. Good UOP. Discussed case with Nephrology who recommends continuing to hold off on diuretics at this time and at time of discharge. Nephrology recommends discharging home with dialysis catheter until SCr improves to new baseline with weekly HD catheter care as an outpatient. Appreciate Nephrology assistance.  Cont to monitor.              Scheduled Meds:   atovaquone  1,500 mg Oral Daily    ceFEPime (MAXIPIME) IVPB  1 g Intravenous Q24H    cycloSPORINE modified (NEORAL)  100 mg Oral BID    docusate sodium  100 mg Oral TID    epoetin maurisio  50 Units/kg Intravenous Every Mon, Wed, Fri    ergocalciferol  50,000 Units Oral Q7 Days    heparin (porcine)  5,000 Units Subcutaneous Q8H    levETIRAcetam  500 mg Oral BID    multivitamin  1 tablet Oral Daily    pantoprazole  40 mg Oral Daily    polyethylene glycol  17 g Oral Daily    predniSONE  2.5 mg Oral Daily    sodium bicarbonate  1,300 mg Oral TID    thiamine  100 mg Oral Daily    valGANciclovir  450 mg Oral Every Mon, Wed, Fri     Continuous Infusions:   PRN Meds:sodium chloride, diphenhydrAMINE, metoclopramide HCl, ondansetron, oxyCODONE-acetaminophen, oxyCODONE-acetaminophen, sodium chloride 0.9%, zolpidem    Review of Systems   Constitutional: Positive for activity change, fatigue and fever. Negative for chills.   Respiratory: Negative for cough, shortness of breath and wheezing.    Cardiovascular: Positive for leg swelling.   Gastrointestinal: Positive for abdominal pain and constipation. Negative for nausea and vomiting.   Genitourinary: Positive for scrotal swelling. Negative for difficulty urinating and dysuria.   Skin: Positive  for wound.   Allergic/Immunologic: Positive for immunocompromised state.   Neurological: Positive for weakness. Negative for speech difficulty.   Psychiatric/Behavioral: Negative for confusion and decreased concentration. The patient is nervous/anxious.      Objective:     Vital Signs (Most Recent):  Temp: 98.2 °F (36.8 °C) (11/29/17 0751)  Pulse: 94 (11/29/17 0751)  Resp: 16 (11/29/17 0751)  BP: 120/68 (11/29/17 0751)  SpO2: 99 % (11/29/17 0751) Vital Signs (24h Range):  Temp:  [98.2 °F (36.8 °C)-98.9 °F (37.2 °C)] 98.2 °F (36.8 °C)  Pulse:  [] 94  Resp:  [16-18] 16  SpO2:  [98 %-100 %] 99 %  BP: (116-123)/(63-68) 120/68     Weight: 77.4 kg (170 lb 9.6 oz)  Body mass index is 27.54 kg/m².    Intake/Output - Last 3 Shifts       11/27 0700 - 11/28 0659 11/28 0700 - 11/29 0659 11/29 0700 - 11/30 0659    P.O. 1050 780     IV Piggyback  350     Total Intake(mL/kg) 1050 (13.6) 1130 (14.6)     Urine (mL/kg/hr) 1910 (1) 485 (0.3) 450 (1.4)    Emesis/NG output       Other 325 (0.2)      Stool  0 (0)     Blood       Total Output 2235 485 450    Net -1185 +645 -450           Urine Occurrence  2 x     Stool Occurrence  3 x           Physical Exam   Constitutional: He is oriented to person, place, and time. No distress.   Cardiovascular: Regular rhythm.  Tachycardia present.    Pulmonary/Chest: Effort normal. He has decreased breath sounds in the right middle field and the right lower field. He has no wheezes.   Abdominal: Soft. There is tenderness. There is no rebound and no guarding.   Wound vac to incision with surrounding erythema.    Neurological: He is alert and oriented to person, place, and time.   Skin: He is not diaphoretic.   Psychiatric: He has a normal mood and affect. His behavior is normal. Thought content normal.   Nursing note and vitals reviewed.      Laboratory:  Immunosuppressants         Stop Route Frequency     cycloSPORINE modified (NEORAL) capsule 100 mg      -- Oral 2 times daily        CBC:      Recent Labs  Lab 11/29/17  0451   WBC 6.29   RBC 2.64*   HGB 7.8*   HCT 22.8*   *   MCV 86   MCH 29.5   MCHC 34.2     CMP:     Recent Labs  Lab 11/29/17  0451   GLU 83   CALCIUM 8.4*   ALBUMIN 2.0*   PROT 4.7*   *   K 3.1*   CO2 21*   CL 91*   BUN 42*   CREATININE 4.4*   ALKPHOS 115   ALT <5*   AST 15   BILITOT 2.2*     Labs within the past 24 hours have been reviewed.    Diagnostic Results:  US Liver Transplant Post:  No results found for this or any previous visit.    Assessment/Plan:     * Leukocytosis    Resolved with abx. See FUO.           Hypomagnesemia    Improved. Monitor with daily labs.           Anemia of chronic disease    H/H low on AM labs. No obvious signs of bleeding. Denies hematachezia, melena. Will send iron studies, hemolytic anemia work up. Will give epo. Plan for 1 unit of PRBC today. Monitor with daily cbc.           Delayed surgical wound healing    Cont wound vac, replaced 11/27. Wound care consulted. Monitor.           Fever of unknown origin    Tmax 11/25 100.2. Leukocytosis resolved  CT A/P obtained which showed moderate volume of perihepatic fluid, ascites, and peritoneal edema unchanged when compared to study of 11/20/17, R pleural fluid with compressive atelectasis, retained stool within colon and residual contrast from previous CT scan on 11/20.   -Wound vac removed from chevron incision no evidence of purulence seen.    -.IR drained abdominal fluid and R pleural effusion 11/25. 1700cc removed from right abdomen. 700cc removed from right lung  Cell count from abdomen with 4996 wbc, 74% segs. Thora cell count 587 wbc, 14% segs. Abdominal fluid neg for bile leak. Cultures pending.   Blood cx remain with no growth. Urine cx no growth. No further episodes of fever.   Remains with abdominal pain and distention. ID consulted and following.    IR unable to perform paracentesis yesterday as dilated bowel impeded access to fluid. Patient give mag citrate with 3 large BM  yesterday evening. Therefore, will have asked IR to re attempt paracentesis today. Will continue Vanc/Cefepime for peritonitis while awaiting cell count results from tap per ID recs. Appreciate ID assistance.  -Cont broad spectrum antibiotics.             Long-term use of immunosuppressant medication    Cont cyclosporine. Cont to monitor cyclo level with daily labs, monitor for toxic side effects, and adjust for therapeutic dose.           Prophylactic immunotherapy    See long term use of immunosuppressant medication.           Liver transplanted    -LFTs stable. Bilirubin remains mildly elevated. Plan for repeat Liver US today. Cont to monitor with daily labs.           Acute renal failure with tubular necrosis    Patient with ongoing ATN since transplant.   Last HD 11/17. Was discharged on 11/22 on lasix 120 mg BID and metolazone 5 mg MWF. Holding diuretics for hyponatremia, infection.  Nephrology consulted for help with management.  Creatinine continue to improve with no need for HD. Good UOP. Discussed case with Nephrology who recommends continuing to hold off on diuretics at this time and at time of discharge. Nephrology recommends discharging home with dialysis catheter until SCr improves to new baseline with weekly HD catheter care as an outpatient. Appreciate Nephrology assistance.  Cont to monitor.                     VTE Risk Mitigation         Ordered     heparin (porcine) injection 5,000 Units  Every 8 hours     Route:  Subcutaneous        11/25/17 1037     High Risk of VTE  Once      11/24/17 1611          The patients clinical status was discussed at multidisplinary rounds, involving transplant surgery, transplant medicine, pharmacy, nursing, nutrition, and social work    Discharge Planning: Not a candidate for discharge at this time. Possible discharge tomorrow.     Citlali Bridges PA-C  Liver Transplant  [facility]

## 2017-11-29 NOTE — PROGRESS NOTES
[facility]  Nephrology  Progress Note    Patient Name: Jhonny Diana  MRN: [id number]  Admission Date: 11/24/2017  Hospital Length of Stay: 4 days  Attending Provider: Alin López Jr., MD   Primary Care Physician: Primary Doctor No  Principal Problem:Leukocytosis    Subjective:     HPI: Mr Diana  is a 27 y.o. M Alcoholic cirrhosis s/p LTx 10/19/17, who presents with fatigue and low-grade temp () at home.   His post-op stay after liver transplant was complicated by wound infection requiring recurrent ex-laparotomies. He was currently on a 10-day course of augmentin to finish 11/28.   Post transplant, his developed acute renal failure requiring HD M/W/F until he started making some urine. Following diuretics (lasix and diuril), his urine outpiut increased and pt was discharged on lasix 120mg BID & metolazone 5mg. Last HD was 11/17/17.   Pt reports that since discharge, on 11/22, he has felt fine other than decreased energy and feeling tired. He denies chills. Had low-grade temperature.   I/O have been meticulously recorded by mother and he is has been matching I & O. Denies dysuria, urinary urgency, frequency, hematuria, flank pain.     Interval History: NAEON,  Feels better, edema improving. C/o poor appetite and some nausea but no emesis. sCr today imroved to 5.0 from 4.7. UO increased to 1.9 lts/24hrs. Net neg 1185 cc/24 hrs.    Review of patient's allergies indicates:   Allergen Reactions    Bactrim [sulfamethoxazole-trimethoprim] Other (See Comments)     Mookie Trell Syndrome     Current Facility-Administered Medications   Medication Frequency    atovaquone suspension 1,500 mg Daily    cefepime in dextrose 5 % 1 gram/50 mL IVPB 1 g Q24H    cycloSPORINE modified (NEORAL) capsule 100 mg BID    diphenhydrAMINE injection 25 mg Q4H PRN    docusate sodium capsule 100 mg TID    ergocalciferol capsule 50,000 Units Q7 Days    heparin (porcine) injection 5,000 Units Q8H     levETIRAcetam tablet 500 mg BID    metoclopramide HCl tablet 10 mg Q6H PRN    multivitamin tablet 1 tablet Daily    ondansetron injection 4 mg Q12H PRN    oxyCODONE-acetaminophen  mg per tablet 1 tablet Q6H PRN    oxyCODONE-acetaminophen 5-325 mg per tablet 1 tablet Q4H PRN    pantoprazole EC tablet 40 mg Daily    polyethylene glycol packet 17 g Daily    predniSONE tablet 2.5 mg Daily    sodium bicarbonate tablet 1,300 mg TID    sodium chloride 0.9% flush 3 mL PRN    thiamine tablet 100 mg Daily    valGANciclovir tablet 450 mg Every Mon, Wed, Fri    vancomycin 750 mg in dextrose 5 % 250 mL IVPB (ready to mix system) Once    zolpidem tablet 5 mg Nightly PRN       Objective:     Vital Signs (Most Recent):  Temp: 98.2 °F (36.8 °C) (11/28/17 1611)  Pulse: 98 (11/28/17 1611)  Resp: 18 (11/28/17 1611)  BP: 119/67 (11/28/17 1611)  SpO2: 98 % (11/28/17 1611)  O2 Device (Oxygen Therapy): room air (11/28/17 1611) Vital Signs (24h Range):  Temp:  [98.2 °F (36.8 °C)-98.6 °F (37 °C)] 98.2 °F (36.8 °C)  Pulse:  [] 98  Resp:  [16-18] 18  SpO2:  [98 %-100 %] 98 %  BP: (103-123)/(59-71) 119/67     Weight: 77.4 kg (170 lb 9.6 oz) (11/28/17 0600)  Body mass index is 27.54 kg/m².  Body surface area is 1.9 meters squared.    I/O last 3 completed shifts:  In: 1340 [P.O.:1290; IV Piggyback:50]  Out: 3365 [Urine:3040; Other:325]    Physical Exam   Constitutional: He is oriented to person, place, and time. He appears well-developed and well-nourished. No distress.   HENT:   Head: Normocephalic and atraumatic.   Eyes: Conjunctivae are normal. Pupils are equal, round, and reactive to light.   Neck: Trachea normal and normal range of motion. Neck supple. No JVD present.   Cardiovascular: Normal rate, regular rhythm, S1 normal, S2 normal, normal heart sounds, intact distal pulses and normal pulses.  Exam reveals no gallop and no friction rub.    No murmur heard.  Pulmonary/Chest: Effort normal and breath sounds normal.    Abdominal: Soft. Bowel sounds are normal. He exhibits no distension. There is tenderness.   Would vac place to incision with surrounding erythema.    Musculoskeletal: Normal range of motion.   Neurological: He is alert and oriented to person, place, and time.   Skin: Skin is warm and dry. Capillary refill takes less than 2 seconds.   Psychiatric: He has a normal mood and affect. His behavior is normal.   Vitals reviewed.      Significant Labs:  CBC:     Recent Labs  Lab 11/28/17  0437   WBC 8.82   RBC 3.25*   HGB 9.7*   HCT 29.0*   *   MCV 89   MCH 29.8   MCHC 33.4     CMP:     Recent Labs  Lab 11/28/17  0437   GLU 72   CALCIUM 8.8   ALBUMIN 2.3*   PROT 5.7*   *   K 3.9   CO2 18*   CL 91*   BUN 43*   CREATININE 4.7*   ALKPHOS 136*   ALT 5*   AST 18   BILITOT 2.5*     Coagulation:     Recent Labs  Lab 11/25/17  0507   INR 1.2     All labs within the past 24 hours have been reviewed.     Significant Imaging:  Labs: Reviewed  X-Ray: Reviewed    Assessment/Plan:     Acute renal failure with tubular necrosis    Pt is a 24 y/o M with LTx 10/19/17 complicated by klebsiella bacteremia and abdominal wound infection who presents with leukocytosis and pyrexia.    - LAURA on CKD (baseline sCr 3.9 - 4.0). Likely instrinsic etiology, multifactorial secondary to sepsis vs. Toxins. Pt is on many medications which can cause renal injury, including but not limited to atovaquone, valgancyclovir, vanc, cyclosporine.   - Improved UOP 1.9 L/day despite holding lasix. Agree with continuing holding lasix may not needed anymore  - Improved renal function with stable electrolytes and sCr peaked at 5.4 now better at 4.8, will need close monitoring of renal function upon dc with very frequent labd ad nephrology follow up in 2 weeks post dc  - no need for RRT today, will reassess in am  - continue supportive care with fluid hydration and treat underlying etiology (abdominal infection)   - strict I & O  - renal diet    PERM-CATH  dressing changes weekly with catheter care. Would recommend to keep perm-cath until new sCr baseline found.                Thank you for your consult. I will follow-up with patient. Please contact us if you have any additional questions.    Krish Frausto MD  Nephrology  [facility]

## 2017-11-29 NOTE — H&P
Inpatient Radiology Pre-procedure Note    History of Present Illness:  Jhonny Diana is a 27 y.o. male who presents for diagnostic paracentesis.  Admission H&P reviewed.  Past Medical History:   Diagnosis Date    Alcoholic hepatitis with ascites     Alcoholic hepatitis with ascites     History of hematemesis 9/28/2017    Hypertension     Renal disorder      Past Surgical History:   Procedure Laterality Date    APPENDECTOMY         Review of Systems:   As documented in primary team H&P    Home Meds:   Prior to Admission medications    Medication Sig Start Date End Date Taking? Authorizing Provider   aspirin (ECOTRIN) 81 MG EC tablet Take 1 tablet (81 mg total) by mouth once daily. 11/8/17  Yes Nathanael Medina MD   atovaquone (MEPRON) 750 mg/5 mL Susp Take 10 mLs (1,500 mg total) by mouth once daily. Stop on 4/17/18 10/19/17 4/17/18 Yes Nathanael Medina MD   cycloSPORINE modified, NEORAL, (NEORAL) 100 MG capsule Take 2 capsules (200 mg total) by mouth 2 (two) times daily. Take with 25mg capsules for total of 250mg PO BID  Patient taking differently: Take 200 mg by mouth 2 (two) times daily.  11/8/17  Yes Nathanael Medina MD   cycloSPORINE modified, NEORAL, 25 MG capsule Take 2 capsules (50 mg total) by mouth 2 (two) times daily. 11/21/17  Yes Alin López Jr., MD   docusate sodium (COLACE) 100 MG capsule Take 100 mg by mouth 3 (three) times daily as needed for Constipation.   Yes Historical Provider, MD   ergocalciferol (ERGOCALCIFEROL) 50,000 unit Cap Take 1 capsule (50,000 Units total) by mouth every 7 days. 11/7/17  Yes Nathanael Medina MD   furosemide (LASIX) 40 MG tablet Take 3 tablets (120 mg total) by mouth 2 (two) times daily. 11/21/17  Yes Alin López Jr., MD   levETIRAcetam (KEPPRA) 500 MG Tab Take 1 tablet (500 mg total) by mouth 2 (two) times daily. 11/7/17 11/7/18 Yes Nathanael Medina MD   metOLazone (ZAROXOLYN) 5 MG tablet Take 1 tablet (5 mg total) by mouth 3 (three) times a week. Take on Mon, Wed, Fri  30 minutes before furosemide 11/22/17  Yes Alin López Jr., MD   multivitamin (THERAGRAN) tablet Take 1 tablet by mouth once daily. 11/7/17  Yes Nathanael Medina MD   mycophenolate (CELLCEPT) 250 mg Cap Take 2 capsules (500 mg total) by mouth 2 (two) times daily. 11/21/17  Yes Alin López Jr., MD   ondansetron (ZOFRAN-ODT) 8 MG TbDL Take 1 tablet (8 mg total) by mouth every 8 (eight) hours as needed (nausea). 11/9/17  Yes Nathanael Medina MD   oxyCODONE-acetaminophen (PERCOCET)  mg per tablet Take 0.5-1 tablets by mouth every 4 (four) hours as needed for Pain. 11/14/17  Yes Solis Chapa MD   pantoprazole (PROTONIX) 40 MG tablet Take 1 tablet (40 mg total) by mouth once daily. 11/8/17  Yes Nathanael Medina MD   predniSONE (DELTASONE) 10 MG tablet Take 20mg PO QD 10/29-11/4; 15mg PO QD 11/5-11/11; 10mg PO QD 11/12-11/18; 5mg PO QD 11/19-11/25; 2.5mg PO QD 11/26-12/2; stop on 12/2/17 11/8/17  Yes Nathanael Medina MD   thiamine 100 MG tablet Take 1 tablet (100 mg total) by mouth once daily. 11/7/17  Yes Nathanael Medina MD   valGANciclovir (VALCYTE) 450 mg Tab Take 1 tablet (450 mg total) by mouth every Mon, Wed, Fri. Stop on 1/17/18 11/22/17  Yes Alin López Jr., MD     Scheduled Meds:    atovaquone  1,500 mg Oral Daily    ceFEPime (MAXIPIME) IVPB  1 g Intravenous Q24H    cycloSPORINE modified (NEORAL)  100 mg Oral BID    docusate sodium  100 mg Oral TID    epoetin maurisio  50 Units/kg Intravenous Every Mon, Wed, Fri    ergocalciferol  50,000 Units Oral Q7 Days    heparin (porcine)  5,000 Units Subcutaneous Q8H    levETIRAcetam  500 mg Oral BID    multivitamin  1 tablet Oral Daily    pantoprazole  40 mg Oral Daily    polyethylene glycol  17 g Oral Daily    predniSONE  2.5 mg Oral Daily    sodium bicarbonate  1,300 mg Oral TID    thiamine  100 mg Oral Daily    valGANciclovir  450 mg Oral Every Mon, Wed, Fri     Continuous Infusions:    PRN Meds:sodium chloride, diphenhydrAMINE, heparin (porcine),  metoclopramide HCl, ondansetron, oxyCODONE-acetaminophen, oxyCODONE-acetaminophen, sodium chloride 0.9%, zolpidem  Anticoagulants/Antiplatelets: aspirin    Allergies:   Review of patient's allergies indicates:   Allergen Reactions    Bactrim [sulfamethoxazole-trimethoprim] Other (See Comments)     Mookie Trell Syndrome     Sedation Hx: have not been any systemic reactions    Labs:    Recent Labs  Lab 11/25/17  0507   INR 1.2       Recent Labs  Lab 11/29/17  0451   WBC 6.29   HGB 7.8*   HCT 22.8*   MCV 86   *      Recent Labs  Lab 11/24/17  0738  11/29/17  0451   GLU 65*  < > 83   *  < > 129*   K 4.6  < > 3.1*   CL 91*  < > 91*   CO2 16*  < > 21*   BUN 34*  < > 42*   CREATININE 4.8*  < > 4.4*   CALCIUM 9.0  < > 8.4*   MG  --   < > 2.3   ALT 5*  < > <5*   AST 12  < > 15   ALBUMIN 2.2*  < > 2.0*   BILITOT 2.7*  < > 2.2*   BILIDIR 2.2*  --   --    < > = values in this interval not displayed.      Vitals:  Temp: 98.5 °F (36.9 °C) (11/29/17 1508)  Pulse: 94 (11/29/17 1646)  Resp: 16 (11/29/17 1646)  BP: (!) 108/56 (11/29/17 1646)  SpO2: 100 % (11/29/17 1646)     Physical Exam:  ASA: 3  Mallampati: 2    General: no acute distress  Mental Status: alert and oriented to person, place and time  HEENT: normocephalic, atraumatic  Chest: unlabored breathing  Abdomen: nondistended  Extremity: moves all extremities    Plan: Diagnostic paracentesis  Sedation Plan: Local    Prosper Valerio M.D.  PGY-2 Radiology  Pager# [phone]

## 2017-11-29 NOTE — ASSESSMENT & PLAN NOTE
Patient with ongoing ATN since transplant.   Last HD 11/17. Was discharged on 11/22 on lasix 120 mg BID and metolazone 5 mg MWF. Holding diuretics for hyponatremia, infection.  Nephrology consulted for help with management.  Creatinine continue to improve with no need for HD. Good UOP. Discussed case with Nephrology who recommends continuing to hold off on diuretics at this time and at time of discharge. Nephrology recommends discharging home with dialysis catheter until SCr improves to new baseline with weekly HD catheter care as an outpatient. Appreciate Nephrology assistance.  Cont to monitor.

## 2017-11-29 NOTE — TELEPHONE ENCOUNTER
----- Message from Kareen Gotti RN sent at 11/24/2017  1:57 PM CST -----  Contact: Eddie      ----- Message -----  From: Alisa Jordan  Sent: 11/24/2017  12:08 PM  To: [facility]    Eddie need the PT CMN form faxed over.    Please call: [phone]    Thanks  Alisa

## 2017-11-29 NOTE — PROGRESS NOTES
[facility]  Infectious Disease  Progress Note    Patient Name: Jhonny Diana  MRN: [id number]  Admission Date: 11/24/2017  Length of Stay: 5 days  Attending Physician: Alin López Jr., MD  Primary Care Provider: Primary Doctor No    Isolation Status: No active isolations  Assessment/Plan:      * Leukocytosis    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, CMV D+/R+, steroid induction, on maintenance tacro/MMF/pred; c/b seizures, LAURA, superificial wound infection s/p wound vac to [facility], and recent admit for fevers on 11/21 discharged on empiric augmentin for suspected superficial wound infection) who was admitted on 11/24/2017 with progressive fatigue, anorexia, R>L abdominal pain, and acute onset fevers/chills due to peritonitis (s/p paracentesis with drainage of 1.7L fluid, WBC ~5000 with ~75% PMN's; cx NGTD on vanc/cefepime). He is modestly improved with regards to fever curve/leukocytosis on empiric coverage after drainage but notes ongoing painful abdominal distension (uncertain if due to ongoing uncontrolled infection or constipation/chronic pain).      - would continue vanc/cefepime for now for post-operative peritonitis   - await repeat paracentesis to assess whether inflammation is improved (cell counts will be most helpful)                Anticipated Disposition: pending improvement    Thank you for your consult. I will follow-up with patient. Please contact us if you have any additional questions.     Melanie Bergman MD  Transplant ID Attending  [id number]    Melanie Bergman MD  Infectious Disease  [facility]    Subjective:     Principal Problem:Leukocytosis    HPI: No notes on file  Interval History: Pain a bit better since small BM yesterday. Afebrile. Awaiting para attempt again.    Review of Systems   Constitutional: Negative for chills, fatigue and fever.   HENT: Negative for congestion, mouth  sores and sinus pressure.    Respiratory: Negative for shortness of breath.    Gastrointestinal: Positive for abdominal distention and abdominal pain. Negative for diarrhea, nausea and vomiting.   Genitourinary: Negative for dysuria, hematuria and urgency.   Musculoskeletal: Negative for arthralgias and myalgias.   Skin: Negative for rash.     Objective:     Vital Signs (Most Recent):  Temp: 98.5 °F (36.9 °C) (11/29/17 1508)  Pulse: 91 (11/29/17 1508)  Resp: 18 (11/29/17 1508)  BP: 114/62 (11/29/17 1508)  SpO2: 99 % (11/29/17 1508) Vital Signs (24h Range):  Temp:  [97.8 °F (36.6 °C)-98.9 °F (37.2 °C)] 98.5 °F (36.9 °C)  Pulse:  [] 91  Resp:  [16-18] 18  SpO2:  [95 %-100 %] 99 %  BP: (108-123)/(56-68) 114/62     Weight: 77.4 kg (170 lb 9.6 oz)  Body mass index is 27.54 kg/m².    Estimated Creatinine Clearance: 24.5 mL/min (based on SCr of 4.4 mg/dL (H)).    Physical Exam   Constitutional: He is oriented to person, place, and time. He appears well-developed and well-nourished.   HENT:   Head: Normocephalic and atraumatic.   Eyes: Conjunctivae and EOM are normal. Pupils are equal, round, and reactive to light.   Neck: Normal range of motion. Neck supple.   Cardiovascular: Normal rate, regular rhythm and normal heart sounds.    Pulmonary/Chest: Effort normal and breath sounds normal.   Abdominal: Soft. Bowel sounds are normal. He exhibits distension. There is tenderness. There is no rebound and no guarding.   Musculoskeletal: Normal range of motion. He exhibits no edema, tenderness or deformity.   Neurological: He is alert and oriented to person, place, and time.   Skin: No rash noted. No erythema.       Significant Labs:   CBC:   Recent Labs  Lab 11/28/17  0437 11/29/17  0451   WBC 8.82 6.29   HGB 9.7* 7.8*   HCT 29.0* 22.8*   * 427*     CMP:   Recent Labs  Lab 11/28/17  0437 11/29/17  0451   * 129*   K 3.9 3.1*   CL 91* 91*   CO2 18* 21*   GLU 72 83   BUN 43* 42*   CREATININE 4.7* 4.4*   CALCIUM 8.8  8.4*   PROT 5.7* 4.7*   ALBUMIN 2.3* 2.0*   BILITOT 2.5* 2.2*   ALKPHOS 136* 115   AST 18 15   ALT 5* <5*   ANIONGAP 21* 17*   EGFRNONAA 15.7* 17.0*       Significant Imaging: I have reviewed all pertinent imaging results/findings within the past 24 hours.

## 2017-11-29 NOTE — NURSING
Pt still working on finishing Mg Citrate(about 1/2 of the bottle remaining). Pt stated that he was unable to take previously documented(AM shift) suppository because it melted. Maynor TEJADA, notified; Med reordered. Pt encourage to drink remainder of Mg Citrate and instructed to let RN know when he had finished both inserting the suppository and drinking the Mg Citrate.     I/Os  Monitored. 2 BMs documented for 11/28.    ADDENDUM:  11/29 0633: Pt only able to tolerate a little more than half of the bottle of Mg Citrate. 3BM total overnight.

## 2017-11-29 NOTE — NURSING
Pt still has mg citrate sitting at bedside. He has been asked multiple times to drink it in hopes of empyting his bowels for paracentesis tomorrow. He keeps stating he will. Will continue to encourage.

## 2017-11-29 NOTE — ASSESSMENT & PLAN NOTE
H/H low on AM labs. No obvious signs of bleeding. Denies hematachezia, melena. Will send iron studies, hemolytic anemia work up. Will give epo. Plan for 1 unit of PRBC today. Monitor with daily cbc.

## 2017-11-29 NOTE — PLAN OF CARE
Problem: Patient Care Overview  Goal: Plan of Care Review  Outcome: Ongoing (interventions implemented as appropriate)  Pt aao x3. Call bell within reach. Pt is up independent in room. Plan for paracentesis today and possible d/c tomorrow. Will continue to monitor.

## 2017-11-29 NOTE — ASSESSMENT & PLAN NOTE
Tmax 11/25 100.2. Leukocytosis resolved  CT A/P obtained which showed moderate volume of perihepatic fluid, ascites, and peritoneal edema unchanged when compared to study of 11/20/17, R pleural fluid with compressive atelectasis, retained stool within colon and residual contrast from previous CT scan on 11/20.   -Wound vac removed from chevron incision no evidence of purulence seen.    -.IR drained abdominal fluid and R pleural effusion 11/25. 1700cc removed from right abdomen. 700cc removed from right lung  Cell count from abdomen with 4996 wbc, 74% segs. Thora cell count 587 wbc, 14% segs. Abdominal fluid neg for bile leak. Cultures pending.   Blood cx remain with no growth. Urine cx no growth. No further episodes of fever.   Remains with abdominal pain and distention. ID consulted and following.    IR unable to perform paracentesis yesterday as dilated bowel impeded access to fluid. Patient give mag citrate with 3 large BM yesterday evening. Therefore, will have asked IR to re attempt paracentesis today. Will continue Vanc/Cefepime for peritonitis while awaiting cell count results from tap per ID recs. Appreciate ID assistance.  -Cont broad spectrum antibiotics.

## 2017-11-30 VITALS
OXYGEN SATURATION: 99 % | HEIGHT: 66 IN | WEIGHT: 170.63 LBS | RESPIRATION RATE: 18 BRPM | DIASTOLIC BLOOD PRESSURE: 59 MMHG | SYSTOLIC BLOOD PRESSURE: 112 MMHG | BODY MASS INDEX: 27.42 KG/M2 | TEMPERATURE: 98 F | HEART RATE: 108 BPM

## 2017-11-30 PROBLEM — E83.42 HYPOMAGNESEMIA: Status: RESOLVED | Noted: 2017-11-18 | Resolved: 2017-11-30

## 2017-11-30 PROBLEM — R50.9 FEVER OF UNKNOWN ORIGIN: Status: RESOLVED | Noted: 2017-11-17 | Resolved: 2017-11-30

## 2017-11-30 PROBLEM — D72.829 LEUKOCYTOSIS: Status: RESOLVED | Noted: 2017-11-24 | Resolved: 2017-11-30

## 2017-11-30 LAB
ALBUMIN SERPL BCP-MCNC: 2 G/DL
ALP SERPL-CCNC: 123 U/L
ALT SERPL W/O P-5'-P-CCNC: <5 U/L
ANION GAP SERPL CALC-SCNC: 14 MMOL/L
AST SERPL-CCNC: 13 U/L
BASOPHILS # BLD AUTO: 0.05 K/UL
BASOPHILS NFR BLD: 0.8 %
BILIRUB SERPL-MCNC: 2.3 MG/DL
BUN SERPL-MCNC: 41 MG/DL
CALCIUM SERPL-MCNC: 8.2 MG/DL
CHLORIDE SERPL-SCNC: 92 MMOL/L
CO2 SERPL-SCNC: 23 MMOL/L
CREAT SERPL-MCNC: 4.3 MG/DL
CYCLOSPORINE BLD LC/MS/MS-MCNC: 209 NG/ML
DIFFERENTIAL METHOD: ABNORMAL
EOSINOPHIL # BLD AUTO: 0 K/UL
EOSINOPHIL NFR BLD: 0.3 %
ERYTHROCYTE [DISTWIDTH] IN BLOOD BY AUTOMATED COUNT: 16.8 %
EST. GFR  (AFRICAN AMERICAN): 20.2 ML/MIN/1.73 M^2
EST. GFR  (NON AFRICAN AMERICAN): 17.5 ML/MIN/1.73 M^2
GLUCOSE SERPL-MCNC: 83 MG/DL
GRAM STN SPEC: NORMAL
GRAM STN SPEC: NORMAL
HCT VFR BLD AUTO: 26.4 %
HGB BLD-MCNC: 9.2 G/DL
IMM GRANULOCYTES # BLD AUTO: 0.17 K/UL
IMM GRANULOCYTES NFR BLD AUTO: 2.6 %
LYMPHOCYTES # BLD AUTO: 1.2 K/UL
LYMPHOCYTES NFR BLD: 18.7 %
MAGNESIUM SERPL-MCNC: 2.3 MG/DL
MCH RBC QN AUTO: 30.3 PG
MCHC RBC AUTO-ENTMCNC: 34.8 G/DL
MCV RBC AUTO: 87 FL
MONOCYTES # BLD AUTO: 1.4 K/UL
MONOCYTES NFR BLD: 21.6 %
NEUTROPHILS # BLD AUTO: 3.7 K/UL
NEUTROPHILS NFR BLD: 56 %
NRBC BLD-RTO: 0 /100 WBC
PHOSPHATE SERPL-MCNC: 3.2 MG/DL
PLATELET # BLD AUTO: 391 K/UL
PMV BLD AUTO: 9 FL
POTASSIUM SERPL-SCNC: 3.7 MMOL/L
PROT SERPL-MCNC: 4.7 G/DL
RBC # BLD AUTO: 3.04 M/UL
SODIUM SERPL-SCNC: 129 MMOL/L
WBC # BLD AUTO: 6.54 K/UL

## 2017-11-30 PROCEDURE — 36415 COLL VENOUS BLD VENIPUNCTURE: CPT

## 2017-11-30 PROCEDURE — 25000003 PHARM REV CODE 250: Performed by: PHYSICIAN ASSISTANT

## 2017-11-30 PROCEDURE — 80053 COMPREHEN METABOLIC PANEL: CPT

## 2017-11-30 PROCEDURE — 63600175 PHARM REV CODE 636 W HCPCS: Performed by: PHYSICIAN ASSISTANT

## 2017-11-30 PROCEDURE — 84100 ASSAY OF PHOSPHORUS: CPT

## 2017-11-30 PROCEDURE — 99233 SBSQ HOSP IP/OBS HIGH 50: CPT | Mod: ,,, | Performed by: INTERNAL MEDICINE

## 2017-11-30 PROCEDURE — 63600175 PHARM REV CODE 636 W HCPCS: Performed by: PEDIATRICS

## 2017-11-30 PROCEDURE — 80158 DRUG ASSAY CYCLOSPORINE: CPT

## 2017-11-30 PROCEDURE — 83735 ASSAY OF MAGNESIUM: CPT

## 2017-11-30 PROCEDURE — 25000003 PHARM REV CODE 250: Performed by: PEDIATRICS

## 2017-11-30 PROCEDURE — 97608 NEG PRS WND THER NDME>50SQCM: CPT

## 2017-11-30 PROCEDURE — 85025 COMPLETE CBC W/AUTO DIFF WBC: CPT

## 2017-11-30 RX ORDER — CYCLOSPORINE 100 MG/1
100 CAPSULE, LIQUID FILLED ORAL 2 TIMES DAILY
Qty: 60 CAPSULE | Refills: 11 | Status: ON HOLD | OUTPATIENT
Start: 2017-11-30 | End: 2017-12-20

## 2017-11-30 RX ORDER — ONDANSETRON 2 MG/ML
4 INJECTION INTRAMUSCULAR; INTRAVENOUS EVERY 12 HOURS PRN
Status: DISCONTINUED | OUTPATIENT
Start: 2017-11-30 | End: 2017-11-30

## 2017-11-30 RX ORDER — METOCLOPRAMIDE 10 MG/1
10 TABLET ORAL EVERY 6 HOURS PRN
Status: DISCONTINUED | OUTPATIENT
Start: 2017-11-30 | End: 2017-11-30 | Stop reason: HOSPADM

## 2017-11-30 RX ORDER — AMOXICILLIN AND CLAVULANATE POTASSIUM 500; 125 MG/1; MG/1
1 TABLET, FILM COATED ORAL 2 TIMES DAILY
Qty: 28 TABLET | Refills: 1 | Status: SHIPPED | OUTPATIENT
Start: 2017-11-30 | End: 2017-11-30

## 2017-11-30 RX ORDER — DIPHENHYDRAMINE HCL 25 MG
25 CAPSULE ORAL EVERY 8 HOURS PRN
Status: DISCONTINUED | OUTPATIENT
Start: 2017-11-30 | End: 2017-11-30 | Stop reason: HOSPADM

## 2017-11-30 RX ORDER — ONDANSETRON 4 MG/1
4 TABLET, ORALLY DISINTEGRATING ORAL EVERY 6 HOURS PRN
Status: DISCONTINUED | OUTPATIENT
Start: 2017-11-30 | End: 2017-11-30 | Stop reason: HOSPADM

## 2017-11-30 RX ORDER — OXYCODONE AND ACETAMINOPHEN 10; 325 MG/1; MG/1
.5-1 TABLET ORAL EVERY 4 HOURS PRN
Qty: 40 TABLET | Refills: 0 | Status: ON HOLD | OUTPATIENT
Start: 2017-11-30 | End: 2017-12-20 | Stop reason: ALTCHOICE

## 2017-11-30 RX ORDER — AMOXICILLIN AND CLAVULANATE POTASSIUM 500; 125 MG/1; MG/1
1 TABLET, FILM COATED ORAL 2 TIMES DAILY
Qty: 28 TABLET | Refills: 1 | Status: ON HOLD | OUTPATIENT
Start: 2017-11-30 | End: 2017-12-20 | Stop reason: ALTCHOICE

## 2017-11-30 RX ORDER — SODIUM BICARBONATE 650 MG/1
1300 TABLET ORAL 2 TIMES DAILY
Qty: 120 TABLET | Refills: 2 | Status: ON HOLD | COMMUNITY
Start: 2017-11-30 | End: 2018-04-09 | Stop reason: HOSPADM

## 2017-11-30 RX ORDER — SODIUM BICARBONATE 650 MG/1
1300 TABLET ORAL 2 TIMES DAILY
Status: DISCONTINUED | OUTPATIENT
Start: 2017-11-30 | End: 2017-11-30 | Stop reason: HOSPADM

## 2017-11-30 RX ADMIN — PANTOPRAZOLE SODIUM 40 MG: 40 TABLET, DELAYED RELEASE ORAL at 08:11

## 2017-11-30 RX ADMIN — ONDANSETRON 4 MG: 2 INJECTION INTRAMUSCULAR; INTRAVENOUS at 01:11

## 2017-11-30 RX ADMIN — ATOVAQUONE 1500 MG: 750 SUSPENSION ORAL at 08:11

## 2017-11-30 RX ADMIN — CYCLOSPORINE 100 MG: 100 CAPSULE, LIQUID FILLED ORAL at 08:11

## 2017-11-30 RX ADMIN — PREDNISONE 2.5 MG: 2.5 TABLET ORAL at 08:11

## 2017-11-30 RX ADMIN — SODIUM BICARBONATE 650 MG TABLET 1300 MG: at 05:11

## 2017-11-30 RX ADMIN — POLYETHYLENE GLYCOL 3350 17 G: 17 POWDER, FOR SOLUTION ORAL at 08:11

## 2017-11-30 RX ADMIN — HEPARIN SODIUM 5000 UNITS: 5000 INJECTION, SOLUTION INTRAVENOUS; SUBCUTANEOUS at 05:11

## 2017-11-30 RX ADMIN — Medication 100 MG: at 08:11

## 2017-11-30 RX ADMIN — ONDANSETRON 4 MG: 4 TABLET, ORALLY DISINTEGRATING ORAL at 10:11

## 2017-11-30 RX ADMIN — LEVETIRACETAM 500 MG: 500 TABLET, FILM COATED ORAL at 08:11

## 2017-11-30 RX ADMIN — OXYCODONE HYDROCHLORIDE AND ACETAMINOPHEN 1 TABLET: 10; 325 TABLET ORAL at 08:11

## 2017-11-30 RX ADMIN — DOCUSATE SODIUM 100 MG: 100 CAPSULE, LIQUID FILLED ORAL at 05:11

## 2017-11-30 RX ADMIN — DOCUSATE SODIUM 100 MG: 100 CAPSULE, LIQUID FILLED ORAL at 03:11

## 2017-11-30 RX ADMIN — OXYCODONE HYDROCHLORIDE AND ACETAMINOPHEN 1 TABLET: 10; 325 TABLET ORAL at 03:11

## 2017-11-30 RX ADMIN — THERA TABS 1 TABLET: TAB at 08:11

## 2017-11-30 NOTE — NURSING
Patient ready for dc to [facility] once wound vac change done. Printed AVS reviewed, blue med card updated by PharmD.Patient has all needed Rx for outpatient use. Follwup appt per AVS. Home health care resumed by EZEKIEL.Patient instructed to return to outpatient dialysis unit for permcath flushes weekly.Aware of how to contact tx coordinator if questions/concerns arise. Patient and mother verbalize undertsanding of dc instructions.

## 2017-11-30 NOTE — PROGRESS NOTES
Discharge Note Cont.    SW received phone call from [facility] of [facility] who confirms patient can get his perm cath line flushed at the HD center by a nurse. Chano reports pt can come in at his normal dialysis time MWF @ 3pm to receive this service. Per SAMREEN Melton, pt needs this done once a week and has last gotten it flushed yesterday. EZEKIEL contacted pt to explain this to him, pt verbalizes understanding and states he will go to HD center next Wednesday to get his line flushed. No other issues or concerns noted. SW remains available for education, resources, and support as appropriate.

## 2017-11-30 NOTE — PROGRESS NOTES
[facility]  Infectious Disease  Progress Note    Patient Name: Jhonny Diana  MRN: [id number]  Admission Date: 11/24/2017  Length of Stay: 6 days  Attending Physician: Alin López Jr., MD  Primary Care Provider: Primary Doctor No    Isolation Status: No active isolations  Assessment/Plan:      * Leukocytosis-resolved as of 11/30/2017    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+, steroid induction, on maintenance tacro/MMF/pred; c/b seizures, LAURA, superificial wound infection s/p wound vac to [facility], and recent admit for fevers on 11/21 discharged on empiric augmentin for suspected superficial wound infection) who was admitted on 11/24/2017 with progressive fatigue, anorexia, R>L abdominal pain, and acute onset fevers/chills due to peritonitis (s/p paracentesis with drainage of 1.7L fluid, WBC ~5000 with ~75% PMN's; cx NGTD on vanc/cefepime). He is modestly improved with regards to fever curve/leukocytosis on empiric coverage after drainage with improving cell counts on repeat tap yesterday. He is stable to transition back to a trial of oral antibiotics for discharge.     - would stop vanc/cefepime and transition to oral augmentin for at least 2wks  - follow-up in transplant clinic anticipated next week -- if symptomatically not improving, would retap and if peritonitis still present, would evaluate for bile leak (being suppressed by antibiotics)              Anticipated Disposition: per primary team    Thank you for your consult. I will sign off. Please contact us if you have any additional questions.   Melanie Bergman MD  Transplant ID Attending  [id number]    Melanie Bergman MD  Infectious Disease  [facility]    Subjective:     Principal Problem:Leukocytosis    HPI: No notes on file  Interval History: Doing well today with improved pain after BM and tap. Afebrile. Counts improving on tap (WBC  down to ~1000, more lymphs now implying improved peritonitis although not resolved). All cx negative.    Review of Systems   Constitutional: Negative for chills, fatigue and fever.   HENT: Negative for congestion, mouth sores and sinus pressure.    Respiratory: Negative for shortness of breath.    Gastrointestinal: Positive for abdominal distention and abdominal pain. Negative for diarrhea, nausea and vomiting.   Genitourinary: Negative for dysuria, hematuria and urgency.   Musculoskeletal: Negative for arthralgias and myalgias.   Skin: Negative for rash.     Objective:     Vital Signs (Most Recent):  Temp: 98 °F (36.7 °C) (11/30/17 1156)  Pulse: 108 (11/30/17 1156)  Resp: 18 (11/30/17 1156)  BP: (!) 112/59 (11/30/17 1156)  SpO2: 99 % (11/30/17 1156) Vital Signs (24h Range):  Temp:  [98 °F (36.7 °C)-98.8 °F (37.1 °C)] 98 °F (36.7 °C)  Pulse:  [] 108  Resp:  [16-18] 18  SpO2:  [98 %-100 %] 99 %  BP: (108-127)/(56-66) 112/59     Weight: 77.4 kg (170 lb 9.6 oz)  Body mass index is 27.54 kg/m².    Estimated Creatinine Clearance: 25 mL/min (based on SCr of 4.3 mg/dL (H)).    Physical Exam   Constitutional: He is oriented to person, place, and time. He appears well-developed and well-nourished.   HENT:   Head: Normocephalic and atraumatic.   Eyes: Conjunctivae and EOM are normal. Pupils are equal, round, and reactive to light.   Neck: Normal range of motion. Neck supple.   Cardiovascular: Normal rate, regular rhythm and normal heart sounds.    Pulmonary/Chest: Effort normal and breath sounds normal.   Abdominal: Soft. Bowel sounds are normal. He exhibits distension. There is tenderness. There is no rebound and no guarding.   Musculoskeletal: Normal range of motion. He exhibits no edema, tenderness or deformity.   Neurological: He is alert and oriented to person, place, and time.   Skin: No rash noted. No erythema.       Significant Labs:   CBC:     Recent Labs  Lab 11/29/17  0451 11/30/17  0439   WBC 6.29 6.54   HGB  7.8* 9.2*   HCT 22.8* 26.4*   * 391*     CMP:     Recent Labs  Lab 11/29/17  0451 11/30/17  0439   * 129*   K 3.1* 3.7   CL 91* 92*   CO2 21* 23   GLU 83 83   BUN 42* 41*   CREATININE 4.4* 4.3*   CALCIUM 8.4* 8.2*   PROT 4.7* 4.7*   ALBUMIN 2.0* 2.0*   BILITOT 2.2* 2.3*   ALKPHOS 115 123   AST 15 13   ALT <5* <5*   ANIONGAP 17* 14   EGFRNONAA 17.0* 17.5*       Significant Imaging: I have reviewed all pertinent imaging results/findings within the past 24 hours.     Microbiology: all cx negative this admission

## 2017-11-30 NOTE — ASSESSMENT & PLAN NOTE
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, CMV D+/R+, steroid induction, on maintenance tacro/MMF/pred; c/b seizures, ALURA, superificial wound infection s/p wound vac to [facility], and recent admit for fevers on 11/21 discharged on empiric augmentin for suspected superficial wound infection) who was admitted on 11/24/2017 with progressive fatigue, anorexia, R>L abdominal pain, and acute onset fevers/chills due to peritonitis (s/p paracentesis with drainage of 1.7L fluid, WBC ~5000 with ~75% PMN's; cx NGTD on vanc/cefepime). He is modestly improved with regards to fever curve/leukocytosis on empiric coverage after drainage with improving cell counts on repeat tap yesterday. He is stable to transition back to a trial of oral antibiotics for discharge.     - would stop vanc/cefepime and transition to oral augmentin for at least 2wks  - follow-up in transplant clinic anticipated next week -- if symptomatically not improving, would retap and if peritonitis still present, would evaluate for bile leak (being suppressed by antibiotics)

## 2017-11-30 NOTE — PROGRESS NOTES
Discharge Medication Note:    Hospital Course: Mr. Jhonny Diana is 26yo man PMH asthma and alcoholic cirrhosis (c/b HE, EV, portal HTN, and HRS; s/p DDLT 10/19/2017, CMV D+/R+, steroid induction, post transplant complicated by seizures, LAURA, superificial wound infection s/p wound vac to [facility], and recent admit for fevers on 11/21 discharged on empiric augmentin for suspected superficial wound infection). He was admitted on 11/24/2017 with progressive fatigue, anorexia, R>L abdominal pain, and acute onset fevers/chills due to peritonitis (s/p paracentesis with drainage of 1.7L fluid, WBC ~5000 with ~75% PMN's; cx NGTD on vanc/cefepime). Fever and leukocytosis improved. However notes ongoing painful abdominal distension (uncertain if due to ongoing uncontrolled infection or constipation/chronic pain).      - will be discharged on augmentin x 2 weeks and re-evaluate duration of treatment outpatient    On discharge, immunosuppresion regimen consists of cyclosporine 100mg BID, prednisone taper   Cellcept on hold due to infection, will restart outpatient.     Opportunistic prophylaxis include: atovaquone, and valcyte MWF (renally dose adjusted)    Of note, he was started on epoietin 3,900 units MWF during this visit and received one dose.     Met with Jhonny Diana at discharge to review discharge medications and to update the blue medication card.       Jhonny Diana   Home Medication Instructions NISHI:[id number]    Printed on:11/30/17 1432   Medication Information                      amoxicillin-clavulanate 500-125mg (AUGMENTIN) 500-125 mg Tab  Take 1 tablet (500 mg total) by mouth 2 (two) times daily.             aspirin (ECOTRIN) 81 MG EC tablet  Take 1 tablet (81 mg total) by mouth once daily.             atovaquone (MEPRON) 750 mg/5 mL Susp  Take 10 mLs (1,500 mg total) by mouth once daily. Stop on 4/17/18             cycloSPORINE modified, NEORAL, (NEORAL) 100 MG capsule  Take 1 capsule (100 mg total) by mouth 2  (two) times daily.             docusate sodium (COLACE) 100 MG capsule  Take 100 mg by mouth 3 (three) times daily as needed for Constipation.             ergocalciferol (ERGOCALCIFEROL) 50,000 unit Cap  Take 1 capsule (50,000 Units total) by mouth every 7 days.             levETIRAcetam (KEPPRA) 500 MG Tab  Take 1 tablet (500 mg total) by mouth 2 (two) times daily.             multivitamin (THERAGRAN) tablet  Take 1 tablet by mouth once daily.             mycophenolate (CELLCEPT) 250 mg Cap  Take 2 capsules (500 mg total) by mouth 2 (two) times daily.             ondansetron (ZOFRAN-ODT) 8 MG TbDL  Take 1 tablet (8 mg total) by mouth every 8 (eight) hours as needed (nausea).             oxyCODONE-acetaminophen (PERCOCET)  mg per tablet  Take 0.5-1 tablets by mouth every 4 (four) hours as needed for Pain.             pantoprazole (PROTONIX) 40 MG tablet  Take 1 tablet (40 mg total) by mouth once daily.             predniSONE (DELTASONE) 10 MG tablet  Take 20mg PO QD 10/29-11/4; 15mg PO QD 11/5-11/11; 10mg PO QD 11/12-11/18; 5mg PO QD 11/19-11/25; 2.5mg PO QD 11/26-12/2; stop on 12/2/17             sodium bicarbonate 650 MG tablet  Take 2 tablets (1,300 mg total) by mouth 2 (two) times daily.             thiamine 100 MG tablet  Take 1 tablet (100 mg total) by mouth once daily.             valGANciclovir (VALCYTE) 450 mg Tab  Take 1 tablet (450 mg total) by mouth every Mon, Wed, Fri. Stop on 1/17/18                 Pt was provided with prescriptions for the following meds:  E-rx: augmentin 500-125, cyclosporine, sodium bicarb  Printed rx: oxycodone-acetaminophen    Phone-in or faxed rx: n/a to n/a pharmacy per pt request.    The following medications have been placed on HOLD and should be restarted in the outpatient setting (when appropriate): cellnahun Diana verbalized understanding and had the opportunity to ask questions.

## 2017-11-30 NOTE — PROGRESS NOTES
Discharge Note: Patient tentatively scheduled to be discharged today to [employer] [address] locally with mom Sanjuanita Diana ph# [phone].  SW spoke with the pt via telephone to complete dc assessment. Pt appeared to be alert, oriented, communicative and asking questions appropriately.  Pt confirmed identifiers. SW discussed discharge plans with the patient and caregiver. Both reported adequate coping.  Pt is set up for outpatient HD at [facility] MWF 3pm, but has not utilized inpt HD.  SW discussed pt may needing to report once a week to have perm cath flushed.  SW left pending stating this will be confirmed once [facility]ano [place], [facility] confirm.  Pt reports understanding.      EZEKIEL lvm for Chano at [facility] ([phone])  that pt will discharge from the hospital and need report to [facility] for perm cath flushes. SW awaiting confirmation that this will be completed.  Pt has his home wound vac from [facility] in hospital room and will resume home health with [facility] for SN wound care and PT. EZEKIEL notified Jadyn ext [phone] of pts discharge.  There were no further questions or concerns at this time. Patient verbalized understanding and agreement with the information reviewed, social work availability, and how to access available resources if needed.    EZEKIEL to confirm perm Cath maintenance once [facility] returns SW call.

## 2017-11-30 NOTE — SUBJECTIVE & OBJECTIVE
Interval History: Doing well today with improved pain after BM and tap. Afebrile. Counts improving on tap (WBC down to ~1000, more lymphs now implying improved peritonitis although not resolved). All cx negative.    Review of Systems   Constitutional: Negative for chills, fatigue and fever.   HENT: Negative for congestion, mouth sores and sinus pressure.    Respiratory: Negative for shortness of breath.    Gastrointestinal: Positive for abdominal distention and abdominal pain. Negative for diarrhea, nausea and vomiting.   Genitourinary: Negative for dysuria, hematuria and urgency.   Musculoskeletal: Negative for arthralgias and myalgias.   Skin: Negative for rash.     Objective:     Vital Signs (Most Recent):  Temp: 98 °F (36.7 °C) (11/30/17 1156)  Pulse: 108 (11/30/17 1156)  Resp: 18 (11/30/17 1156)  BP: (!) 112/59 (11/30/17 1156)  SpO2: 99 % (11/30/17 1156) Vital Signs (24h Range):  Temp:  [98 °F (36.7 °C)-98.8 °F (37.1 °C)] 98 °F (36.7 °C)  Pulse:  [] 108  Resp:  [16-18] 18  SpO2:  [98 %-100 %] 99 %  BP: (108-127)/(56-66) 112/59     Weight: 77.4 kg (170 lb 9.6 oz)  Body mass index is 27.54 kg/m².    Estimated Creatinine Clearance: 25 mL/min (based on SCr of 4.3 mg/dL (H)).    Physical Exam   Constitutional: He is oriented to person, place, and time. He appears well-developed and well-nourished.   HENT:   Head: Normocephalic and atraumatic.   Eyes: Conjunctivae and EOM are normal. Pupils are equal, round, and reactive to light.   Neck: Normal range of motion. Neck supple.   Cardiovascular: Normal rate, regular rhythm and normal heart sounds.    Pulmonary/Chest: Effort normal and breath sounds normal.   Abdominal: Soft. Bowel sounds are normal. He exhibits distension. There is tenderness. There is no rebound and no guarding.   Musculoskeletal: Normal range of motion. He exhibits no edema, tenderness or deformity.   Neurological: He is alert and oriented to person, place, and time.   Skin: No rash noted. No  erythema.       Significant Labs:   CBC:     Recent Labs  Lab 11/29/17 0451 11/30/17 0439   WBC 6.29 6.54   HGB 7.8* 9.2*   HCT 22.8* 26.4*   * 391*     CMP:     Recent Labs  Lab 11/29/17 0451 11/30/17 0439   * 129*   K 3.1* 3.7   CL 91* 92*   CO2 21* 23   GLU 83 83   BUN 42* 41*   CREATININE 4.4* 4.3*   CALCIUM 8.4* 8.2*   PROT 4.7* 4.7*   ALBUMIN 2.0* 2.0*   BILITOT 2.2* 2.3*   ALKPHOS 115 123   AST 15 13   ALT <5* <5*   ANIONGAP 17* 14   EGFRNONAA 17.0* 17.5*       Significant Imaging: I have reviewed all pertinent imaging results/findings within the past 24 hours.     Microbiology: all cx negative this admission

## 2017-11-30 NOTE — DISCHARGE SUMMARY
[facility]  Liver Transplant  Discharge Summary      Patient Name: Jhonny Diana  MRN: [id number]  Admission Date: 2017  Hospital Length of Stay: 5 days  Discharge Date and Time:  2017 1:38 PM  Attending Physician: Alin López Jr., MD   Discharging Provider: Citlali Bridges PA-C  Primary Care Provider: Primary Doctor No  Post-Operative Day: 42     ORGAN:   LIVER  Disease Etiology: Acute Alcoholic Hepatitis  Donor Type:    - Brain Death  CDC High Risk:   No  Donor CMV Status:   Donor CMV Status: Positive  Donor HBcAB:   Negative  Donor HCV Status:   Negative  Whole or Partial: Whole Liver  Biliary Anastomosis: End to End  Arterial Anatomy: Standard  -  Hospital Course:  Mr. Diana is a 28yo man w/a history of alcoholic cirrhosis s/p DDLT 10/19/2017; c/b seizures (swtiched off prograf to cyclo), ATN requiring HD, superificial wound infection s/p wound vac to chevron incision, and recent admit for fevers on  (discharged on empiric augmentin for suspected superficial wound infection) who was admitted on 2017 with leukocytosis (wbc 13), fever/chills, fatigue, and R>L abdominal pain. Infectious work up revealved peritonitis (ascites/peritoneal fluid seen on CT A/P s/p paracentesis on  with drainage of 1.7L fluid, WBC ~5000 with ~75% PMN's; cx NGTD, fluid neg for bile leak). Blood and urine cx with no growth to date. R thoracentesis also performed with 700cc removed from right lung, negative for infection. (wbc 587,~ 14% PMN's, cx NGTD. He was placed on broad spectrum antibiotics (Vanc/Cefepime) with resolution of fevers/leukocytosis. ID was consulted for help with management. Repeat paracentesis was obtained on  to assess for improvement in inflammation/infection with decrease in cell count seen (wbc 5000 with ~ 75% PMN's-->wbc 1400 with ~45% PMN's). Antibiotics were deescalated to Augmentin per ID recs. He will complete at least 14 days for treatment of  peritonitis. Pt will need repeat para next week to further dictate abx course.    Patient with ongoing ATN after transplant requiring HD. He has not required HD since 11/17 . Nephrology was consulted for help with management during admission. Diuretics were held on admission to the hospital for hyponatremia and ongoing infection/sepsis. Patient started showing signs of renal recovery while inpatient with decreasing creatinine, stable electrolytes, and increased UOP. SCr 4.3 at time of discharge. He will need close monitoring as an outpatient with frequent labs to ensure continued renal recovery and no need for HD. He will need Nephrology follow up in 1-2 weeks after discharge. Nephrology recommending keeping patient's perm-cath in place at time of discharge until continued renal clearance is achieved or new SCr baseline is found. He will discharge to HD clinic for CVC line care until removed.  Line care last performed on 11/29 with heparin flushing. Diuretics will continue to be held at time of discharge per Nephrology recommendations. Diuretics likely to need to be restarted once new baseline SCr is achieved.     Of note, patient with ongoing anemia of chronic disease requiring 1 unit of PRBC on 11/29. He was given epo once during stay and may need weekly injections in the future.    On day of discharge, patient feeling well without complaint, tolerating diet, ambulating, pain well controlled. Chevron incision with continued wound vac. He is stable and ready for discharge. He will follow up with labs 12/1 and clinic appointment next week. Patient verbalized his understanding prior to discharge.     Final Active Diagnoses:    Diagnosis Date Noted POA    PRINCIPAL PROBLEM:  Leukocytosis [D72.829] 11/24/2017 Yes    Abdominal infection [K65.9] 11/27/2017 Yes    Wound healing, delayed [T14.8XXD] 11/25/2017 Yes    Hypomagnesemia [E83.42] 11/18/2017 Yes    Anemia of chronic disease [D63.8] 11/18/2017 Yes    Fever of  unknown origin [R50.9] 11/17/2017 Yes    Delayed surgical wound healing [T81.89XA] 11/17/2017 Yes    Liver transplanted [Z94.4] 10/20/2017 Not Applicable     Chronic    Prophylactic immunotherapy [Z29.8] 10/20/2017 Not Applicable     Chronic    Long-term use of immunosuppressant medication [Z79.899] 10/20/2017 Not Applicable     Chronic    Acute renal failure with tubular necrosis [N17.0] 09/28/2017 Yes      Problems Resolved During this Admission:    Diagnosis Date Noted Date Resolved POA       Consults         Status Ordering Provider     Inpatient consult to Infectious Diseases  Once     Provider:  (Not yet assigned)    Completed ANTON LIVINGSTON     Inpatient consult to Interventional Radiology  Once     Provider:  (Not yet assigned)    Completed ANTON LIVINGSTON     Inpatient consult to Midline team  Once     Provider:  (Not yet assigned)    Completed FARZANEH MENDOZA     Inpatient consult to Nephrology  Once     Provider:  (Not yet assigned)    Completed DONTRELL MORGAN          Pending Diagnostic Studies:     Procedure Component Value Units Date/Time    Cyclosporine level [[id number]] Collected:  11/30/17 0439    Order Status:  Sent Lab Status:  In process Updated:  11/30/17 0555    Specimen:  Blood from Blood     Urinalysis [[id number]] Collected:  11/24/17 1807    Order Status:  Sent Lab Status:  In process Updated:  11/24/17 1807    Specimen:  Urine     Vancomycin, random [[id number]]     Order Status:  Sent Lab Status:  No result     Specimen:  Blood from Blood         Significant Diagnostic Studies: Labs:   CMP   Recent Labs  Lab 11/30/17 0439   *   K 3.7   CL 92*   CO2 23   GLU 83   BUN 41*   CREATININE 4.3*   CALCIUM 8.2*   PROT 4.7*   ALBUMIN 2.0*   BILITOT 2.3*   ALKPHOS 123   AST 13   ALT <5*   ANIONGAP 14   ESTGFRAFRICA 20.2*   EGFRNONAA 17.5*   , CBC   Recent Labs  Lab 11/30/17 0439   WBC 6.54   HGB 9.2*   HCT 26.4*   *    and All labs within the past 24 hours  have been reviewed    The patients clinical status was discussed at multidisplinary rounds, involving transplant surgery, transplant medicine, pharmacy, nursing, nutrition, and social work    Discharged Condition: good    Patient Instructions:   No discharge procedures on file.  Medications:  Reconciled Home Medications:   Discharge Medication List as of 11/30/2017  5:30 PM      START taking these medications    Details   oxyCODONE-acetaminophen (PERCOCET)  mg per tablet Take 0.5-1 tablets by mouth every 4 (four) hours as needed for Pain., Starting Thu 11/30/2017, Print      sodium bicarbonate 650 MG tablet Take 2 tablets (1,300 mg total) by mouth 2 (two) times daily., Starting Thu 11/30/2017, OTC         CONTINUE these medications which have CHANGED    Details   amoxicillin-clavulanate 500-125mg (AUGMENTIN) 500-125 mg Tab Take 1 tablet (500 mg total) by mouth 2 (two) times daily., Starting Thu 11/30/2017, Normal      cycloSPORINE modified, NEORAL, (NEORAL) 100 MG capsule Take 1 capsule (100 mg total) by mouth 2 (two) times daily., Starting Thu 11/30/2017, Normal         CONTINUE these medications which have NOT CHANGED    Details   aspirin (ECOTRIN) 81 MG EC tablet Take 1 tablet (81 mg total) by mouth once daily., Starting Wed 11/8/2017, OTC      atovaquone (MEPRON) 750 mg/5 mL Susp Take 10 mLs (1,500 mg total) by mouth once daily. Stop on 4/17/18, Starting Thu 10/19/2017, Until Tue 4/17/2018, Normal      docusate sodium (COLACE) 100 MG capsule Take 100 mg by mouth 3 (three) times daily as needed for Constipation., Historical Med      ergocalciferol (ERGOCALCIFEROL) 50,000 unit Cap Take 1 capsule (50,000 Units total) by mouth every 7 days., Starting Tue 11/7/2017, Normal      levETIRAcetam (KEPPRA) 500 MG Tab Take 1 tablet (500 mg total) by mouth 2 (two) times daily., Starting Tue 11/7/2017, Until Wed 11/7/2018, Normal      multivitamin (THERAGRAN) tablet Take 1 tablet by mouth once daily., Starting Tue  11/7/2017, Normal      mycophenolate (CELLCEPT) 250 mg Cap Take 2 capsules (500 mg total) by mouth 2 (two) times daily., Starting Tue 11/21/2017, Normal      ondansetron (ZOFRAN-ODT) 8 MG TbDL Take 1 tablet (8 mg total) by mouth every 8 (eight) hours as needed (nausea)., Starting Thu 11/9/2017, Normal      pantoprazole (PROTONIX) 40 MG tablet Take 1 tablet (40 mg total) by mouth once daily., Starting Wed 11/8/2017, Normal      predniSONE (DELTASONE) 10 MG tablet Take 20mg PO QD 10/29-11/4; 15mg PO QD 11/5-11/11; 10mg PO QD 11/12-11/18; 5mg PO QD 11/19-11/25; 2.5mg PO QD 11/26-12/2; stop on 12/2/17, Normal      thiamine 100 MG tablet Take 1 tablet (100 mg total) by mouth once daily., Starting Tue 11/7/2017, Normal      valGANciclovir (VALCYTE) 450 mg Tab Take 1 tablet (450 mg total) by mouth every Mon, Wed, Fri. Stop on 1/17/18, Starting Wed 11/22/2017, Normal           Time spent caring for patient (Greater than 1/2 spent in direct face-to-face contact): > 30 minutes    Citlali Bridges PA-C  Liver Transplant  [facility]

## 2017-11-30 NOTE — PLAN OF CARE
Problem: Patient Care Overview  Goal: Plan of Care Review  Outcome: Ongoing (interventions implemented as appropriate)  Pt AAO, mother at the bedside. VSS, see flowsheet for assessment details.      Infection control in progress; WBC 6.29 (11/29). Repeat Paracentesis(w/ cx) [id number]; WBC(bodily fluid)1479 .ID following; Vanc dc'ed     Chevron incision currently dressed w/ wound vac Wound vac set to 125mmHg; pt tolerating well, output 75ml(serous)     I/Os monitored. HD PRN. UO 120mls(+ 2UM and 2 BM).     Fall precautions in place; pt remains free of injury. Daily labs monitored. NO acute events overnight.

## 2017-12-01 DIAGNOSIS — T14.8XXD DELAYED WOUND HEALING: ICD-10-CM

## 2017-12-01 DIAGNOSIS — R53.81 PHYSICAL DECONDITIONING: Primary | ICD-10-CM

## 2017-12-01 NOTE — PROGRESS NOTES
Follow up visit for vac change prior to discharge.  Pt ready and awaiting dressing change prior to leaving for [facility] apartment. Nurse had medicated for pain prior to arrival and he tolerated drape removal well. Wound beefy red and granulating. Still has tract at right lateral aspect of wound that requires packing up to 2cm. Left side if transverse wound much more superficial.   Protected wound edges with hydrocolloid. [place] to wound bed and sealed at 125mmHg suction. Connected to home vac unit and assisted to wheelchair.   Nurse arrived to d/c IV site prior to discharge.   Zoë Murphy RN CWON  [phone]

## 2017-12-02 ENCOUNTER — NURSE TRIAGE (OUTPATIENT)
Dept: ADMINISTRATIVE | Facility: CLINIC | Age: 27
End: 2017-12-02

## 2017-12-02 PROCEDURE — 96366 THER/PROPH/DIAG IV INF ADDON: CPT

## 2017-12-02 PROCEDURE — 99285 EMERGENCY DEPT VISIT HI MDM: CPT | Mod: 25

## 2017-12-02 PROCEDURE — 96375 TX/PRO/DX INJ NEW DRUG ADDON: CPT

## 2017-12-02 PROCEDURE — 96372 THER/PROPH/DIAG INJ SC/IM: CPT | Mod: XU

## 2017-12-02 PROCEDURE — 96361 HYDRATE IV INFUSION ADD-ON: CPT

## 2017-12-02 PROCEDURE — 96365 THER/PROPH/DIAG IV INF INIT: CPT

## 2017-12-02 PROCEDURE — 99285 EMERGENCY DEPT VISIT HI MDM: CPT | Mod: ,,,

## 2017-12-03 ENCOUNTER — HOSPITAL ENCOUNTER (INPATIENT)
Facility: HOSPITAL | Age: 27
LOS: 17 days | Discharge: HOME HEALTH | DRG: 949 | End: 2017-12-20
Attending: EMERGENCY MEDICINE | Admitting: TRANSPLANT SURGERY
Payer: COMMERCIAL

## 2017-12-03 DIAGNOSIS — R00.0 TACHYCARDIA: ICD-10-CM

## 2017-12-03 DIAGNOSIS — E80.6 HYPERBILIRUBINEMIA: ICD-10-CM

## 2017-12-03 DIAGNOSIS — E03.9 HYPOTHYROIDISM, UNSPECIFIED TYPE: ICD-10-CM

## 2017-12-03 DIAGNOSIS — D72.829 LEUKOCYTOSIS, UNSPECIFIED TYPE: ICD-10-CM

## 2017-12-03 DIAGNOSIS — K83.1 BILIARY STRICTURE OF TRANSPLANTED LIVER: ICD-10-CM

## 2017-12-03 DIAGNOSIS — R56.9 SEIZURE: ICD-10-CM

## 2017-12-03 DIAGNOSIS — Z79.60 LONG-TERM USE OF IMMUNOSUPPRESSANT MEDICATION: Chronic | ICD-10-CM

## 2017-12-03 DIAGNOSIS — E43 SEVERE MALNUTRITION: ICD-10-CM

## 2017-12-03 DIAGNOSIS — E87.70 HYPERVOLEMIA, UNSPECIFIED HYPERVOLEMIA TYPE: ICD-10-CM

## 2017-12-03 DIAGNOSIS — E46 PROTEIN-CALORIE MALNUTRITION, UNSPECIFIED SEVERITY: ICD-10-CM

## 2017-12-03 DIAGNOSIS — Z29.89 PROPHYLACTIC IMMUNOTHERAPY: Chronic | ICD-10-CM

## 2017-12-03 DIAGNOSIS — E87.79 OTHER HYPERVOLEMIA: ICD-10-CM

## 2017-12-03 DIAGNOSIS — R50.9 FEVER, UNSPECIFIED FEVER CAUSE: ICD-10-CM

## 2017-12-03 DIAGNOSIS — R50.9 FEVER: Primary | ICD-10-CM

## 2017-12-03 DIAGNOSIS — Z94.4 LIVER TRANSPLANTED: Chronic | ICD-10-CM

## 2017-12-03 DIAGNOSIS — N17.9 AKI (ACUTE KIDNEY INJURY): ICD-10-CM

## 2017-12-03 DIAGNOSIS — E87.70 VOLUME OVERLOAD: ICD-10-CM

## 2017-12-03 DIAGNOSIS — D72.829 LEUKOCYTOSIS: ICD-10-CM

## 2017-12-03 DIAGNOSIS — Z91.89 AT RISK FOR OPPORTUNISTIC INFECTIONS: ICD-10-CM

## 2017-12-03 DIAGNOSIS — T86.49 BILIARY STRICTURE OF TRANSPLANTED LIVER: ICD-10-CM

## 2017-12-03 DIAGNOSIS — T81.89XD DELAYED SURGICAL WOUND HEALING, SUBSEQUENT ENCOUNTER: ICD-10-CM

## 2017-12-03 DIAGNOSIS — K65.9 PERITONITIS: ICD-10-CM

## 2017-12-03 DIAGNOSIS — D63.8 ANEMIA OF CHRONIC DISEASE: ICD-10-CM

## 2017-12-03 PROBLEM — R60.1 ANASARCA: Status: RESOLVED | Noted: 2017-09-28 | Resolved: 2017-12-03

## 2017-12-03 LAB
ALBUMIN SERPL BCP-MCNC: 1.8 G/DL
ALBUMIN SERPL BCP-MCNC: 2 G/DL
ALBUMIN SERPL BCP-MCNC: 2.1 G/DL
ALP SERPL-CCNC: 127 U/L
ALP SERPL-CCNC: 148 U/L
ALP SERPL-CCNC: 149 U/L
ALT SERPL W/O P-5'-P-CCNC: <5 U/L
ANION GAP SERPL CALC-SCNC: 14 MMOL/L
ANION GAP SERPL CALC-SCNC: 15 MMOL/L
ANION GAP SERPL CALC-SCNC: 17 MMOL/L
ANISOCYTOSIS BLD QL SMEAR: ABNORMAL
ANISOCYTOSIS BLD QL SMEAR: SLIGHT
ANISOCYTOSIS BLD QL SMEAR: SLIGHT
AST SERPL-CCNC: 14 U/L
AST SERPL-CCNC: 15 U/L
AST SERPL-CCNC: 16 U/L
BACTERIA #/AREA URNS AUTO: ABNORMAL /HPF
BASO STIPL BLD QL SMEAR: ABNORMAL
BASOPHILS # BLD AUTO: ABNORMAL K/UL
BASOPHILS NFR BLD: 0 %
BILIRUB DIRECT SERPL-MCNC: 2.8 MG/DL
BILIRUB SERPL-MCNC: 3.4 MG/DL
BILIRUB SERPL-MCNC: 3.5 MG/DL
BILIRUB SERPL-MCNC: 3.6 MG/DL
BILIRUB UR QL STRIP: NEGATIVE
BUN SERPL-MCNC: 44 MG/DL
BUN SERPL-MCNC: 44 MG/DL
BUN SERPL-MCNC: 45 MG/DL
CALCIUM SERPL-MCNC: 7.7 MG/DL
CALCIUM SERPL-MCNC: 8.1 MG/DL
CALCIUM SERPL-MCNC: 8.3 MG/DL
CHLORIDE SERPL-SCNC: 91 MMOL/L
CHLORIDE SERPL-SCNC: 93 MMOL/L
CHLORIDE SERPL-SCNC: 95 MMOL/L
CLARITY UR REFRACT.AUTO: ABNORMAL
CO2 SERPL-SCNC: 19 MMOL/L
CO2 SERPL-SCNC: 19 MMOL/L
CO2 SERPL-SCNC: 20 MMOL/L
COLOR UR AUTO: ABNORMAL
CREAT SERPL-MCNC: 4.3 MG/DL
CREAT SERPL-MCNC: 4.4 MG/DL
CREAT SERPL-MCNC: 4.5 MG/DL
CYCLOSPORINE BLD LC/MS/MS-MCNC: 232 NG/ML
CYCLOSPORINE BLD LC/MS/MS-MCNC: 449 NG/ML
DACRYOCYTES BLD QL SMEAR: ABNORMAL
DACRYOCYTES BLD QL SMEAR: ABNORMAL
DIFFERENTIAL METHOD: ABNORMAL
EOSINOPHIL # BLD AUTO: ABNORMAL K/UL
EOSINOPHIL NFR BLD: 0 %
EOSINOPHIL NFR BLD: 0 %
EOSINOPHIL NFR BLD: 2 %
ERYTHROCYTE [DISTWIDTH] IN BLOOD BY AUTOMATED COUNT: 17 %
ERYTHROCYTE [DISTWIDTH] IN BLOOD BY AUTOMATED COUNT: 17.1 %
ERYTHROCYTE [DISTWIDTH] IN BLOOD BY AUTOMATED COUNT: 17.5 %
EST. GFR  (AFRICAN AMERICAN): 19.1 ML/MIN/1.73 M^2
EST. GFR  (AFRICAN AMERICAN): 19.7 ML/MIN/1.73 M^2
EST. GFR  (AFRICAN AMERICAN): 20.2 ML/MIN/1.73 M^2
EST. GFR  (NON AFRICAN AMERICAN): 16.5 ML/MIN/1.73 M^2
EST. GFR  (NON AFRICAN AMERICAN): 17 ML/MIN/1.73 M^2
EST. GFR  (NON AFRICAN AMERICAN): 17.5 ML/MIN/1.73 M^2
FLUAV AG SPEC QL IA: NEGATIVE
FLUBV AG SPEC QL IA: NEGATIVE
GIANT PLATELETS BLD QL SMEAR: PRESENT
GLUCOSE SERPL-MCNC: 123 MG/DL
GLUCOSE SERPL-MCNC: 96 MG/DL
GLUCOSE SERPL-MCNC: 98 MG/DL
GLUCOSE UR QL STRIP: NEGATIVE
HCT VFR BLD AUTO: 26.2 %
HCT VFR BLD AUTO: 30.2 %
HCT VFR BLD AUTO: 31.6 %
HGB BLD-MCNC: 10.5 G/DL
HGB BLD-MCNC: 10.8 G/DL
HGB BLD-MCNC: 9 G/DL
HGB UR QL STRIP: ABNORMAL
HYALINE CASTS UR QL AUTO: 3 /LPF
HYPOCHROMIA BLD QL SMEAR: ABNORMAL
IMM GRANULOCYTES # BLD AUTO: ABNORMAL K/UL
IMM GRANULOCYTES NFR BLD AUTO: ABNORMAL %
INR PPP: 1.4
INR PPP: 1.4
KETONES UR QL STRIP: NEGATIVE
LACTATE SERPL-SCNC: 0.7 MMOL/L
LEUKOCYTE ESTERASE UR QL STRIP: NEGATIVE
LYMPHOCYTES # BLD AUTO: ABNORMAL K/UL
LYMPHOCYTES NFR BLD: 15 %
LYMPHOCYTES NFR BLD: 6 %
LYMPHOCYTES NFR BLD: 9 %
MAGNESIUM SERPL-MCNC: 1.8 MG/DL
MAGNESIUM SERPL-MCNC: 1.9 MG/DL
MCH RBC QN AUTO: 30.3 PG
MCH RBC QN AUTO: 30.6 PG
MCH RBC QN AUTO: 30.7 PG
MCHC RBC AUTO-ENTMCNC: 34.2 G/DL
MCHC RBC AUTO-ENTMCNC: 34.4 G/DL
MCHC RBC AUTO-ENTMCNC: 34.8 G/DL
MCV RBC AUTO: 88 FL
MCV RBC AUTO: 89 FL
MCV RBC AUTO: 89 FL
MICROSCOPIC COMMENT: ABNORMAL
MONOCYTES # BLD AUTO: ABNORMAL K/UL
MONOCYTES NFR BLD: 10 %
MONOCYTES NFR BLD: 18 %
MONOCYTES NFR BLD: 8 %
NEUTROPHILS # BLD AUTO: ABNORMAL K/UL
NEUTROPHILS NFR BLD: 64 %
NEUTROPHILS NFR BLD: 79 %
NEUTROPHILS NFR BLD: 86 %
NEUTS BAND NFR BLD MANUAL: 3 %
NITRITE UR QL STRIP: NEGATIVE
NRBC BLD-RTO: 0 /100 WBC
OVALOCYTES BLD QL SMEAR: ABNORMAL
PH UR STRIP: 5 [PH] (ref 5–8)
PHOSPHATE SERPL-MCNC: 3.7 MG/DL
PHOSPHATE SERPL-MCNC: 3.7 MG/DL
PLATELET # BLD AUTO: 272 K/UL
PLATELET # BLD AUTO: 308 K/UL
PLATELET # BLD AUTO: 324 K/UL
PLATELET BLD QL SMEAR: ABNORMAL
PMV BLD AUTO: 9.3 FL
PMV BLD AUTO: 9.6 FL
PMV BLD AUTO: 9.8 FL
POIKILOCYTOSIS BLD QL SMEAR: SLIGHT
POIKILOCYTOSIS BLD QL SMEAR: SLIGHT
POLYCHROMASIA BLD QL SMEAR: ABNORMAL
POTASSIUM SERPL-SCNC: 3.1 MMOL/L
POTASSIUM SERPL-SCNC: 3.3 MMOL/L
POTASSIUM SERPL-SCNC: 3.4 MMOL/L
PROT SERPL-MCNC: 4.2 G/DL
PROT SERPL-MCNC: 4.8 G/DL
PROT SERPL-MCNC: 4.9 G/DL
PROT UR QL STRIP: ABNORMAL
PROTHROMBIN TIME: 14.3 SEC
PROTHROMBIN TIME: 14.7 SEC
RBC # BLD AUTO: 2.93 M/UL
RBC # BLD AUTO: 3.43 M/UL
RBC # BLD AUTO: 3.56 M/UL
RBC #/AREA URNS AUTO: 1 /HPF (ref 0–4)
SCHISTOCYTES BLD QL SMEAR: PRESENT
SODIUM SERPL-SCNC: 127 MMOL/L
SODIUM SERPL-SCNC: 128 MMOL/L
SODIUM SERPL-SCNC: 128 MMOL/L
SP GR UR STRIP: 1.02 (ref 1–1.03)
SPECIMEN SOURCE: NORMAL
URN SPEC COLLECT METH UR: ABNORMAL
UROBILINOGEN UR STRIP-ACNC: NEGATIVE EU/DL
WBC # BLD AUTO: 10.45 K/UL
WBC # BLD AUTO: 10.91 K/UL
WBC # BLD AUTO: 12.79 K/UL
WBC #/AREA URNS AUTO: 1 /HPF (ref 0–5)

## 2017-12-03 PROCEDURE — 36415 COLL VENOUS BLD VENIPUNCTURE: CPT

## 2017-12-03 PROCEDURE — 99223 1ST HOSP IP/OBS HIGH 75: CPT | Mod: ,,, | Performed by: PHYSICIAN ASSISTANT

## 2017-12-03 PROCEDURE — 25500020 PHARM REV CODE 255: Performed by: EMERGENCY MEDICINE

## 2017-12-03 PROCEDURE — 93005 ELECTROCARDIOGRAM TRACING: CPT

## 2017-12-03 PROCEDURE — 96372 THER/PROPH/DIAG INJ SC/IM: CPT | Mod: XU

## 2017-12-03 PROCEDURE — 25000003 PHARM REV CODE 250: Performed by: NURSE PRACTITIONER

## 2017-12-03 PROCEDURE — 25000003 PHARM REV CODE 250: Performed by: PHYSICIAN ASSISTANT

## 2017-12-03 PROCEDURE — 87040 BLOOD CULTURE FOR BACTERIA: CPT

## 2017-12-03 PROCEDURE — 80158 DRUG ASSAY CYCLOSPORINE: CPT | Mod: 91

## 2017-12-03 PROCEDURE — 84100 ASSAY OF PHOSPHORUS: CPT

## 2017-12-03 PROCEDURE — 96366 THER/PROPH/DIAG IV INF ADDON: CPT

## 2017-12-03 PROCEDURE — 83735 ASSAY OF MAGNESIUM: CPT

## 2017-12-03 PROCEDURE — 80158 DRUG ASSAY CYCLOSPORINE: CPT

## 2017-12-03 PROCEDURE — 63600175 PHARM REV CODE 636 W HCPCS

## 2017-12-03 PROCEDURE — 80053 COMPREHEN METABOLIC PANEL: CPT | Mod: 91

## 2017-12-03 PROCEDURE — 63600175 PHARM REV CODE 636 W HCPCS: Performed by: PHYSICIAN ASSISTANT

## 2017-12-03 PROCEDURE — 85027 COMPLETE CBC AUTOMATED: CPT

## 2017-12-03 PROCEDURE — 93010 ELECTROCARDIOGRAM REPORT: CPT | Mod: ,,, | Performed by: INTERNAL MEDICINE

## 2017-12-03 PROCEDURE — 85007 BL SMEAR W/DIFF WBC COUNT: CPT | Mod: 91

## 2017-12-03 PROCEDURE — 85610 PROTHROMBIN TIME: CPT | Mod: 91

## 2017-12-03 PROCEDURE — 85610 PROTHROMBIN TIME: CPT

## 2017-12-03 PROCEDURE — 87086 URINE CULTURE/COLONY COUNT: CPT

## 2017-12-03 PROCEDURE — 81001 URINALYSIS AUTO W/SCOPE: CPT

## 2017-12-03 PROCEDURE — 84100 ASSAY OF PHOSPHORUS: CPT | Mod: 91

## 2017-12-03 PROCEDURE — 20600001 HC STEP DOWN PRIVATE ROOM

## 2017-12-03 PROCEDURE — 83735 ASSAY OF MAGNESIUM: CPT | Mod: 91

## 2017-12-03 PROCEDURE — 96361 HYDRATE IV INFUSION ADD-ON: CPT

## 2017-12-03 PROCEDURE — 96375 TX/PRO/DX INJ NEW DRUG ADDON: CPT

## 2017-12-03 PROCEDURE — 80053 COMPREHEN METABOLIC PANEL: CPT

## 2017-12-03 PROCEDURE — 82248 BILIRUBIN DIRECT: CPT

## 2017-12-03 PROCEDURE — 25000003 PHARM REV CODE 250

## 2017-12-03 PROCEDURE — 96365 THER/PROPH/DIAG IV INF INIT: CPT | Mod: XS

## 2017-12-03 PROCEDURE — 99223 1ST HOSP IP/OBS HIGH 75: CPT | Mod: ,,, | Performed by: INTERNAL MEDICINE

## 2017-12-03 PROCEDURE — 87400 INFLUENZA A/B EACH AG IA: CPT

## 2017-12-03 PROCEDURE — 83605 ASSAY OF LACTIC ACID: CPT

## 2017-12-03 RX ORDER — GABAPENTIN 400 MG/1
800 CAPSULE ORAL ONCE
Status: DISCONTINUED | OUTPATIENT
Start: 2017-12-03 | End: 2017-12-03

## 2017-12-03 RX ORDER — OXYCODONE HYDROCHLORIDE 5 MG/1
10 TABLET ORAL EVERY 6 HOURS PRN
Status: DISCONTINUED | OUTPATIENT
Start: 2017-12-03 | End: 2017-12-05

## 2017-12-03 RX ORDER — PANTOPRAZOLE SODIUM 40 MG/1
40 TABLET, DELAYED RELEASE ORAL DAILY
Status: DISCONTINUED | OUTPATIENT
Start: 2017-12-03 | End: 2017-12-20 | Stop reason: HOSPADM

## 2017-12-03 RX ORDER — HEPARIN SODIUM 5000 [USP'U]/ML
5000 INJECTION, SOLUTION INTRAVENOUS; SUBCUTANEOUS EVERY 8 HOURS
Status: DISCONTINUED | OUTPATIENT
Start: 2017-12-03 | End: 2017-12-04

## 2017-12-03 RX ORDER — ONDANSETRON 8 MG/1
8 TABLET, ORALLY DISINTEGRATING ORAL EVERY 8 HOURS PRN
Status: DISCONTINUED | OUTPATIENT
Start: 2017-12-03 | End: 2017-12-20 | Stop reason: HOSPADM

## 2017-12-03 RX ORDER — ACETAMINOPHEN 325 MG/1
650 TABLET ORAL EVERY 8 HOURS PRN
Status: DISCONTINUED | OUTPATIENT
Start: 2017-12-03 | End: 2017-12-20 | Stop reason: HOSPADM

## 2017-12-03 RX ORDER — DOCUSATE SODIUM 100 MG/1
100 CAPSULE, LIQUID FILLED ORAL 3 TIMES DAILY PRN
Status: DISCONTINUED | OUTPATIENT
Start: 2017-12-03 | End: 2017-12-04

## 2017-12-03 RX ORDER — CEFEPIME HYDROCHLORIDE 1 G/50ML
1 INJECTION, SOLUTION INTRAVENOUS
Status: DISCONTINUED | OUTPATIENT
Start: 2017-12-03 | End: 2017-12-06

## 2017-12-03 RX ORDER — ONDANSETRON 8 MG/1
8 TABLET, ORALLY DISINTEGRATING ORAL EVERY 8 HOURS PRN
Status: DISCONTINUED | OUTPATIENT
Start: 2017-12-03 | End: 2017-12-04

## 2017-12-03 RX ORDER — ERGOCALCIFEROL 1.25 MG/1
50000 CAPSULE ORAL
Status: DISCONTINUED | OUTPATIENT
Start: 2017-12-05 | End: 2017-12-11

## 2017-12-03 RX ORDER — POTASSIUM CHLORIDE 750 MG/1
20 CAPSULE, EXTENDED RELEASE ORAL ONCE
Status: COMPLETED | OUTPATIENT
Start: 2017-12-03 | End: 2017-12-03

## 2017-12-03 RX ORDER — ONDANSETRON 2 MG/ML
4 INJECTION INTRAMUSCULAR; INTRAVENOUS
Status: COMPLETED | OUTPATIENT
Start: 2017-12-03 | End: 2017-12-03

## 2017-12-03 RX ORDER — ATOVAQUONE 750 MG/5ML
1500 SUSPENSION ORAL DAILY
Status: DISCONTINUED | OUTPATIENT
Start: 2017-12-03 | End: 2017-12-20 | Stop reason: HOSPADM

## 2017-12-03 RX ORDER — POTASSIUM CHLORIDE 750 MG/1
40 CAPSULE, EXTENDED RELEASE ORAL ONCE
Status: DISCONTINUED | OUTPATIENT
Start: 2017-12-03 | End: 2017-12-03

## 2017-12-03 RX ORDER — SODIUM CHLORIDE 0.9 % (FLUSH) 0.9 %
3 SYRINGE (ML) INJECTION
Status: DISCONTINUED | OUTPATIENT
Start: 2017-12-03 | End: 2017-12-11

## 2017-12-03 RX ORDER — THIAMINE HCL 50 MG
100 TABLET ORAL DAILY
Status: DISCONTINUED | OUTPATIENT
Start: 2017-12-03 | End: 2017-12-09

## 2017-12-03 RX ORDER — SODIUM BICARBONATE 650 MG/1
1300 TABLET ORAL 2 TIMES DAILY
Status: DISCONTINUED | OUTPATIENT
Start: 2017-12-03 | End: 2017-12-06

## 2017-12-03 RX ORDER — RAMELTEON 8 MG/1
8 TABLET ORAL NIGHTLY PRN
Status: DISCONTINUED | OUTPATIENT
Start: 2017-12-03 | End: 2017-12-18

## 2017-12-03 RX ORDER — SODIUM CHLORIDE 9 MG/ML
INJECTION, SOLUTION INTRAVENOUS CONTINUOUS
Status: DISCONTINUED | OUTPATIENT
Start: 2017-12-03 | End: 2017-12-05

## 2017-12-03 RX ORDER — OXYCODONE HYDROCHLORIDE 5 MG/1
5 TABLET ORAL EVERY 6 HOURS PRN
Status: DISCONTINUED | OUTPATIENT
Start: 2017-12-03 | End: 2017-12-05

## 2017-12-03 RX ORDER — LEVETIRACETAM 500 MG/1
500 TABLET ORAL 2 TIMES DAILY
Status: DISCONTINUED | OUTPATIENT
Start: 2017-12-03 | End: 2017-12-09

## 2017-12-03 RX ORDER — VALGANCICLOVIR 450 MG/1
450 TABLET, FILM COATED ORAL
Status: DISCONTINUED | OUTPATIENT
Start: 2017-12-04 | End: 2017-12-09

## 2017-12-03 RX ADMIN — ATOVAQUONE 1500 MG: 750 SUSPENSION ORAL at 01:12

## 2017-12-03 RX ADMIN — POTASSIUM CHLORIDE 20 MEQ: 750 CAPSULE, EXTENDED RELEASE ORAL at 07:12

## 2017-12-03 RX ADMIN — SODIUM CHLORIDE 1000 ML: 0.9 INJECTION, SOLUTION INTRAVENOUS at 01:12

## 2017-12-03 RX ADMIN — HEPARIN SODIUM 5000 UNITS: 5000 INJECTION, SOLUTION INTRAVENOUS; SUBCUTANEOUS at 10:12

## 2017-12-03 RX ADMIN — HEPARIN SODIUM 5000 UNITS: 5000 INJECTION, SOLUTION INTRAVENOUS; SUBCUTANEOUS at 06:12

## 2017-12-03 RX ADMIN — SODIUM BICARBONATE 650 MG TABLET 1300 MG: at 10:12

## 2017-12-03 RX ADMIN — PANTOPRAZOLE SODIUM 40 MG: 40 TABLET, DELAYED RELEASE ORAL at 09:12

## 2017-12-03 RX ADMIN — CYCLOSPORINE 100 MG: 100 CAPSULE, LIQUID FILLED ORAL at 11:12

## 2017-12-03 RX ADMIN — VANCOMYCIN HYDROCHLORIDE 1000 MG: 1 INJECTION, POWDER, LYOPHILIZED, FOR SOLUTION INTRAVENOUS at 02:12

## 2017-12-03 RX ADMIN — OXYCODONE HYDROCHLORIDE 10 MG: 5 TABLET ORAL at 07:12

## 2017-12-03 RX ADMIN — CEFEPIME HYDROCHLORIDE 1 G: 1 INJECTION, POWDER, FOR SOLUTION INTRAMUSCULAR; INTRAVENOUS at 02:12

## 2017-12-03 RX ADMIN — OXYCODONE HYDROCHLORIDE 10 MG: 5 TABLET ORAL at 10:12

## 2017-12-03 RX ADMIN — LEVETIRACETAM 500 MG: 500 TABLET, FILM COATED ORAL at 10:12

## 2017-12-03 RX ADMIN — IOHEXOL 15 ML: 350 INJECTION, SOLUTION INTRAVENOUS at 02:12

## 2017-12-03 RX ADMIN — LEVETIRACETAM 500 MG: 500 TABLET, FILM COATED ORAL at 09:12

## 2017-12-03 RX ADMIN — SODIUM CHLORIDE: 0.9 INJECTION, SOLUTION INTRAVENOUS at 11:12

## 2017-12-03 RX ADMIN — THERA TABS 1 TABLET: TAB at 09:12

## 2017-12-03 RX ADMIN — CEFEPIME HYDROCHLORIDE 1 G: 1 INJECTION, POWDER, FOR SOLUTION INTRAMUSCULAR; INTRAVENOUS at 03:12

## 2017-12-03 RX ADMIN — Medication 100 MG: at 09:12

## 2017-12-03 RX ADMIN — CYCLOSPORINE 100 MG: 100 CAPSULE, LIQUID FILLED ORAL at 10:12

## 2017-12-03 RX ADMIN — SODIUM BICARBONATE 650 MG TABLET 1300 MG: at 09:12

## 2017-12-03 RX ADMIN — ONDANSETRON 4 MG: 2 INJECTION INTRAMUSCULAR; INTRAVENOUS at 01:12

## 2017-12-03 RX ADMIN — HEPARIN SODIUM 5000 UNITS: 5000 INJECTION, SOLUTION INTRAVENOUS; SUBCUTANEOUS at 03:12

## 2017-12-03 NOTE — ASSESSMENT & PLAN NOTE
Jhonny Diana a 27 year old with acute alcoholic hepatitis who was transplant on 10/19/17 course complicated by seizures, ATN, wound infection and peritonitis. He was admitted for peritonitis from 11/24-11/30 for peritonitis. Upon discharge patient stated that he felt well but last night once again started to have fevers. In the setting of recent transplant, continued fevers, and elevated bili     Plan:   - LAURA on CKD (baseline sCr 3.9 - 4.0). Likely instrinsic etiology, multifactorial secondary to sepsis vs. Toxins  - Continue with sodium biacarbonate 1,300 mg q TID for acidosis  - Strict inatake and output, per mother made around 1.2 L on their documented measurement.  - Will have paracentesis and ERCP for tomorrow  - Avoid nephrotoxic medication, renal dose medications and avoid contrast   - Renal diet  - No need for RRT

## 2017-12-03 NOTE — PROGRESS NOTES
RN Proactive Rounding Note  Time of Visit: 1137    Admit Date: 12/3/2017  LOS: 0  Code Status: Full Code   Date of Visit: 2017  : 1990  Age: 27 y.o.  Sex: male  Bed: 8091/8091 A:   MRN: [id number]  Was the patient discharged from an ICU this admission? no   Was the patient discharged from a PACU within last 24 hours? no  Did the patient receive conscious sedation/general anesthesia in last 24 hours? no  Was the patient in the ED within the past 24 hours? yes  Was the patient started on NIPPV within the past 24 hours? no  Attending Physician: Solis Chapa MD  [place] [facility] [facility]      ASSESSMENT:     Modified Early Warning Score (MEWS): 4  Abnormal Vital Signs: , RR 26, /59 (76)  Clinical Issues: Circulatory     INTERVENTIONS/ RECOMMENDATIONS:     Patient seen for MEWS score of 4. HR increased to 136bpm, RR increased to 26/min, Sats 100% on RA, /59. Patient resting in bed at time of round. Endorses abdominal pain (prn medication administered 15min prior to rounds). Spoke with Kristan, bedside RN. Orders for continuous IVF infusing ([id number]). Telemetry showing /bpm at time of rounds. Recommend checking another set of vitals and continuing strict I&Os.     Discussed plan of care with SISSY Rushing    PHYSICIAN ESCALATION:     Yes/No No    Disposition: Remain on TSU room 8091A    FOLLOW-UP/CONTINGENCY:     Will follow-up this afternoon.     Call back the Rapid Response Nurse at x [phone]  for additional questions or concerns

## 2017-12-03 NOTE — CONSULTS
[facility]  Advanced Endoscopy Service  Consult Note    Patient Name: Jhonny Diana  MRN: [id number]  Admission Date: 12/3/2017  Hospital Length of Stay: 0 days  Code Status: Full Code   Attending Provider: Solis Chapa MD   Consulting Provider: iKmberli Kilpatrick MD  Primary Care Physician: Primary Doctor No  Principal Problem:<principal problem not specified>    Inpatient consult to [facility] Endoscopy Service (AES)  Consult performed by: KIMBERLI KILPATRICK  Consult ordered by: MARCUS CASAREZ        Subjective:     HPI:  27 year old with acute alcoholic hepatitis who was transplant on 10/19/17 course complicated by seizures, ATN, wound infection and peritonitis. AES consulted for possible need for ERCP in the setting of increased bili and fevers.    History provided by patient.   Patient was admitted from 11/17-11/21 for a suspected wound infection, he was discharged on Augmentin.  He then returned for ongoing fevers and was in the hospital from 11/24-11/30 for peritonitis. He was treated with IV antibiotics and was sent home on Augmentin as repeat paracentesis showed improvement in counts.    He now presented to the ED due to continued fevers as well as generalized malaise. He states that when he is discharged he always feels well but after being home for a couple of days always has to return due to something (most recently it has been the events described above). Accompanied with his malaise he still reports diffuse abdominal pain which he is unsure if it is better/worse/same than pain he had during his last admission. He states that he has been taking his Augmentin. Denies any chest pain, shortness of breath, nausea, emesis, urinary symptoms or BRB/melena/diarrhea.    Imaging studies:  CT abdomen 12/3/17:  1.  Postoperative change of prior orthotopic liver transplant with persistent moderate to large volume of abdominopelvic ascites as well as nonspecific mesenteric haziness/edema.  2.  Small volume  of right pleural fluid, decreased from prior study.  Right basilar atelectasis..  3.  Anasarca.  4.  Small volume of pericardial fluid.    Past Medical History:   Diagnosis Date    Alcoholic hepatitis with ascites     Alcoholic hepatitis with ascites     History of hematemesis 9/28/2017    Hypertension     Renal disorder        Past Surgical History:   Procedure Laterality Date    APPENDECTOMY         Review of patient's allergies indicates:   Allergen Reactions    Bactrim [sulfamethoxazole-trimethoprim] Other (See Comments)     Mookie Trell Syndrome     Family History     None        Social History Main Topics    Smoking status: Former Smoker     Types: Cigarettes    Smokeless tobacco: Never Used    Alcohol use No      Comment: a fifth of liquor daily for years, cut back over last 2 months    Drug use: No    Sexual activity: Not on file     Review of Systems   Constitutional: Positive for chills, fatigue and fever. Negative for activity change and appetite change.   HENT: Negative for trouble swallowing.    Eyes: Negative.    Respiratory: Negative.    Cardiovascular: Negative.    Gastrointestinal: Positive for abdominal distention and abdominal pain. Negative for anal bleeding, blood in stool, constipation, diarrhea, nausea, rectal pain and vomiting.   Endocrine: Negative.    Genitourinary: Negative.    Neurological: Negative.      Objective:     Vital Signs (Most Recent):  Temp: 99.2 °F (37.3 °C) (12/03/17 1130)  Pulse: 110 (12/03/17 1130)  Resp: 18 (12/03/17 1130)  BP: 117/64 (12/03/17 1130)  SpO2: 99 % (12/03/17 1130) Vital Signs (24h Range):  Temp:  [98.5 °F (36.9 °C)-99.2 °F (37.3 °C)] 99.2 °F (37.3 °C)  Pulse:  [] 110  Resp:  [14-22] 18  SpO2:  [99 %-100 %] 99 %  BP: (103-117)/(57-65) 117/64     Weight: 79.4 kg (175 lb) (12/02/17 9165)  Body mass index is 28.25 kg/m².      Intake/Output Summary (Last 24 hours) at 12/03/17 1133  Last data filed at 12/03/17 6608   Gross per 24 hour   Intake               600 ml   Output                0 ml   Net              600 ml       Lines/Drains/Airways     Central Venous Catheter Line                 Hemodialysis Catheter 10/30/17 1140 right internal jugular 34 days          Peripheral Intravenous Line                 Peripheral IV - Single Lumen 12/03/17 0116 Left Hand less than 1 day         Peripheral IV - Single Lumen 12/03/17 0121 Right Hand less than 1 day                Physical Exam   Constitutional: He is oriented to person, place, and time. No distress.   HENT:   Head: Normocephalic and atraumatic.   Eyes: No scleral icterus.   Neck: Normal range of motion.   Cardiovascular:   Sinus tachycardia     Pulmonary/Chest: Effort normal and breath sounds normal.   Abdominal: Soft. Bowel sounds are normal. He exhibits distension. He exhibits no mass. There is tenderness. There is no rebound and no guarding. No hernia.   Patient has wound vac along chevron incision form transplant   Musculoskeletal: Normal range of motion. He exhibits edema.   Neurological: He is alert and oriented to person, place, and time.   Skin: He is not diaphoretic.       Significant Labs:  CBC:   Recent Labs  Lab 12/03/17  0117 12/03/17  0410 12/03/17  1105   WBC 12.79* 10.45 10.91   HGB 10.5* 9.0* 10.8*   HCT 30.2* 26.2* 31.6*    272 308     CMP:   Recent Labs  Lab 12/03/17  1105   GLU 98   CALCIUM 8.3*   ALBUMIN 2.0*   PROT 4.8*   *   K 3.4*   CO2 20*   CL 93*   BUN 45*   CREATININE 4.4*   ALKPHOS 148*   ALT <5*   AST 16   BILITOT 3.6*     Coagulation:   Recent Labs  Lab 12/03/17  0410   INR 1.4*       Significant Imaging:  Imaging results within the past 24 hours have been reviewed.    Assessment/Plan:     Hyperbilirubinemia    27 year old with acute alcoholic hepatitis who was transplant on 10/19/17 course complicated by seizures, ATN, wound infection and peritonitis. AES consulted for possible need for ERCP in the setting of increased bili and fevers. Of note patient has  had 2 recent admissions one for a superficial wound infection and another one for peritonitis. He was admitted for peritonitis from 11/24-11/30 for peritonitis. Upon discharge patient stated that he felt well but last night once again started to have fevers. In the setting of recent transplant, continued fevers, and elevated bili it is not unreasonable perform an ERCP and make sure there are no strictures. Also would recommend a repeat paracentesis.     Recommendations:  -NPO after MN for tentative ERCP in AM  -Also recommend performing a repeat paracentesis in the setting of continued tenderness and history of peritonitis  -Case discussed with attending            Thank you for your consult. I will follow-up with patient. Please contact us if you have any additional questions.    Divya Booker M.D.  Gastroenterology Fellow, PGY-IV  Pager: [phone]  [facility]

## 2017-12-03 NOTE — SUBJECTIVE & OBJECTIVE
Past Medical History:   Diagnosis Date    Alcoholic hepatitis with ascites     Alcoholic hepatitis with ascites     History of hematemesis 9/28/2017    Hypertension     Renal disorder        Past Surgical History:   Procedure Laterality Date    APPENDECTOMY         Review of patient's allergies indicates:   Allergen Reactions    Bactrim [sulfamethoxazole-trimethoprim] Other (See Comments)     Mookie Trell Syndrome     Current Facility-Administered Medications   Medication Frequency    0.9%  NaCl infusion Continuous    acetaminophen tablet 650 mg Q8H PRN    atovaquone suspension 1,500 mg Daily    cefepime in dextrose 5 % 1 gram/50 mL IVPB 1 g Q12H    cycloSPORINE modified (NEORAL) capsule 100 mg BID    docusate sodium capsule 100 mg TID PRN    [START ON 12/5/2017] ergocalciferol capsule 50,000 Units Q7 Days    heparin (porcine) injection 5,000 Units Q8H    levETIRAcetam tablet 500 mg BID    multivitamin tablet 1 tablet Daily    ondansetron disintegrating tablet 8 mg Q8H PRN    ondansetron disintegrating tablet 8 mg Q8H PRN    oxyCODONE immediate release tablet 10 mg Q6H PRN    oxyCODONE immediate release tablet 5 mg Q6H PRN    pantoprazole EC tablet 40 mg Daily    ramelteon tablet 8 mg Nightly PRN    sodium bicarbonate tablet 1,300 mg BID    sodium chloride 0.9% flush 3 mL PRN    thiamine tablet 100 mg Daily    [START ON 12/4/2017] valGANciclovir tablet 450 mg Every Mon, Wed, Fri    vancomycin 1 g in dextrose 5 % 250 mL IVPB (ready to mix system) Q24H     Family History     None        Social History Main Topics    Smoking status: Former Smoker     Types: Cigarettes    Smokeless tobacco: Never Used    Alcohol use No      Comment: a fifth of liquor daily for years, cut back over last 2 months    Drug use: No    Sexual activity: Not on file     Review of Systems   Constitutional: Positive for chills, fatigue and fever. Negative for activity change and appetite change.   HENT: Negative  for trouble swallowing.    Eyes: Negative.    Respiratory: Negative.    Cardiovascular: Negative.    Gastrointestinal: Positive for abdominal distention and abdominal pain. Negative for anal bleeding, blood in stool, constipation, diarrhea, nausea, rectal pain and vomiting.   Endocrine: Negative.    Genitourinary: Negative.    Neurological: Negative.      Objective:     Vital Signs (Most Recent):  Temp: 99.9 °F (37.7 °C) (12/03/17 1610)  Pulse: (!) 112 (12/03/17 1611)  Resp: 16 (12/03/17 1610)  BP: (!) 105/55 (12/03/17 1611)  SpO2: 98 % (12/03/17 1611)  O2 Device (Oxygen Therapy): room air (12/03/17 1610) Vital Signs (24h Range):  Temp:  [98.5 °F (36.9 °C)-99.9 °F (37.7 °C)] 99.9 °F (37.7 °C)  Pulse:  [] 112  Resp:  [14-22] 16  SpO2:  [97 %-100 %] 98 %  BP: (103-117)/(54-65) 105/55     Weight: 79.4 kg (175 lb) (12/02/17 2355)  Body mass index is 28.25 kg/m².  Body surface area is 1.92 meters squared.    I/O last 3 completed shifts:  In: 600 [IV Piggyback:600]  Out: -     Physical Exam   Constitutional: He is oriented to person, place, and time. No distress.   Cardiovascular: Regular rhythm.  Tachycardia present.    Pulmonary/Chest: Effort normal. He has decreased breath sounds in the right lower field and the left lower field. He has no wheezes.   Abdominal: Soft. He exhibits distension. There is tenderness. There is no rebound and no guarding.   Wound vac to incision   Musculoskeletal: He exhibits edema.   Neurological: He is alert and oriented to person, place, and time.   Skin: He is not diaphoretic.   Psychiatric: He has a normal mood and affect. His behavior is normal. Thought content normal.   Nursing note and vitals reviewed.      Significant Labs:  ABGs: No results for input(s): PH, PCO2, HCO3, POCSATURATED, BE in the last 168 hours.  BMP:   Recent Labs  Lab 12/03/17  0410 12/03/17  1105   * 98   CL 95 93*   CO2 19* 20*   BUN 44* 45*   CREATININE 4.3* 4.4*   CALCIUM 7.7* 8.3*   MG 1.8  --       CBC:   Recent Labs  Lab 12/03/17  1105   WBC 10.91   RBC 3.56*   HGB 10.8*   HCT 31.6*      MCV 89   MCH 30.3   MCHC 34.2     CMP:   Recent Labs  Lab 12/03/17  1105   GLU 98   CALCIUM 8.3*   ALBUMIN 2.0*   PROT 4.8*   *   K 3.4*   CO2 20*   CL 93*   BUN 45*   CREATININE 4.4*   ALKPHOS 148*   ALT <5*   AST 16   BILITOT 3.6*     All labs within the past 24 hours have been reviewed.

## 2017-12-03 NOTE — PROGRESS NOTES
[facility]  Liver Transplant  Progress Note    Patient Name: Jhonny Diana  MRN: [id number]  Admission Date: 12/3/2017  Hospital Length of Stay: 0 days  Code Status: Full Code  Primary Care Provider: Primary Doctor No  Post-Operative Day: 45    ORGAN:   LIVER  Disease Etiology: Acute Alcoholic Hepatitis  Donor Type:    - Brain Death  CDC High Risk:   No  Donor CMV Status:   Donor CMV Status: Positive  Donor HBcAB:   Negative  Donor HCV Status:   Negative  Whole or Partial: Whole Liver  Biliary Anastomosis: End to End  Arterial Anatomy: Standard  Subjective:     History of Present Illness:  Mr. Diana is a 26yo man w/a history of alcoholic cirrhosis s/p DDLT 10/19/2017; c/b seizures (swtiched off prograf to cyclo), ATN requiring HD (last HD ), superificial wound infection s/p wound vac to chevron incision, and recent admit for fevers on  (discharged on empiric augmentin for suspected superficial wound infection) and readmitted  again with fever. Found to have peritonitis (WBC 5000, 75% PNM) neg for bile leak. He was treated initially with vanc/cefepime. Repeat cell counts  with some improvement (WBC 1400, 45% PNM). Pt transitioned to augmentin upon d/c .      Pt presented to the ED this morning with subjective fever (100.5) and general malaise beginning at 8pm yesterday. His abdomen is soft, but diffusely tender. Labs reveal elevated WBC count to 12.7 from 6.5 in addition to elevated tbili 3.5 from 2.3 when compared to labs 3 days ago, despite being on augmentin for recent diagnosis of peritonitis. He denies nausea, vomiting, diarrhea, congestion, cough, CP or SOB. He has received 1 L bolus NS in the ED. Infectious mosley intiated, will start BS abx (vanc/cef) and obtain CT abdomen.     He has a wound vac in place that was changed yesterday.    Hospital Course:  Interval History:  Pt to TSU.  (+) abdomina distention, pain and Tez LE edema.  Mother reports appetite poor.   Will order Nepro.  Afebrile since admit.  Cont Vanc and Cefepime.  T sunday up.  PBS consulted for possible ERCP to interrogate duct tomorrow.  Concern for bile leak.  Plan for para first in am to assess fluid for infection and t sunday.  Pt recently hospitalized for peritonitis.      Past Medical History:   Diagnosis Date    Alcoholic hepatitis with ascites     Alcoholic hepatitis with ascites     History of hematemesis 9/28/2017    Hypertension     Renal disorder        Past Surgical History:   Procedure Laterality Date    APPENDECTOMY         Review of patient's allergies indicates:   Allergen Reactions    Bactrim [sulfamethoxazole-trimethoprim] Other (See Comments)     Mookie Trell Syndrome       Family History     None        Social History Main Topics    Smoking status: Former Smoker     Types: Cigarettes    Smokeless tobacco: Never Used    Alcohol use No      Comment: a fifth of liquor daily for years, cut back over last 2 months    Drug use: No    Sexual activity: Not on file       PTA Medications   Medication Sig    amoxicillin-clavulanate 500-125mg (AUGMENTIN) 500-125 mg Tab Take 1 tablet (500 mg total) by mouth 2 (two) times daily.    aspirin (ECOTRIN) 81 MG EC tablet Take 1 tablet (81 mg total) by mouth once daily.    atovaquone (MEPRON) 750 mg/5 mL Susp Take 10 mLs (1,500 mg total) by mouth once daily. Stop on 4/17/18    cycloSPORINE modified, NEORAL, (NEORAL) 100 MG capsule Take 1 capsule (100 mg total) by mouth 2 (two) times daily.    docusate sodium (COLACE) 100 MG capsule Take 100 mg by mouth 3 (three) times daily as needed for Constipation.    ergocalciferol (ERGOCALCIFEROL) 50,000 unit Cap Take 1 capsule (50,000 Units total) by mouth every 7 days.    levETIRAcetam (KEPPRA) 500 MG Tab Take 1 tablet (500 mg total) by mouth 2 (two) times daily.    multivitamin (THERAGRAN) tablet Take 1 tablet by mouth once daily.    mycophenolate (CELLCEPT) 250 mg Cap Take 2 capsules (500 mg total)  by mouth 2 (two) times daily.    ondansetron (ZOFRAN-ODT) 8 MG TbDL Take 1 tablet (8 mg total) by mouth every 8 (eight) hours as needed (nausea).    oxyCODONE-acetaminophen (PERCOCET)  mg per tablet Take 0.5-1 tablets by mouth every 4 (four) hours as needed for Pain.    pantoprazole (PROTONIX) 40 MG tablet Take 1 tablet (40 mg total) by mouth once daily.    predniSONE (DELTASONE) 10 MG tablet Take 20mg PO QD 10/29-11/4; 15mg PO QD 11/5-11/11; 10mg PO QD 11/12-11/18; 5mg PO QD 11/19-11/25; 2.5mg PO QD 11/26-12/2; stop on 12/2/17    sodium bicarbonate 650 MG tablet Take 2 tablets (1,300 mg total) by mouth 2 (two) times daily.    thiamine 100 MG tablet Take 1 tablet (100 mg total) by mouth once daily.    valGANciclovir (VALCYTE) 450 mg Tab Take 1 tablet (450 mg total) by mouth every Mon, Wed, Fri. Stop on 1/17/18       Review of Systems   Constitutional: Positive for activity change, fatigue and fever. Negative for chills.   Respiratory: Negative for cough, shortness of breath and wheezing.    Cardiovascular: Positive for leg swelling.   Gastrointestinal: Positive for abdominal pain and constipation. Negative for nausea and vomiting.   Genitourinary: Negative for difficulty urinating and dysuria.   Skin: Positive for wound.   Allergic/Immunologic: Positive for immunocompromised state.   Neurological: Positive for weakness.   Psychiatric/Behavioral: Negative for confusion and decreased concentration. The patient is nervous/anxious.      Objective:     Vital Signs (Most Recent):  Temp: 98.9 °F (37.2 °C) (12/03/17 1014)  Pulse: (!) 136 (12/03/17 1014)  Resp: 16 (12/03/17 1014)  BP: (!) 103/59 (12/03/17 1014)  SpO2: 100 % (12/03/17 1014) Vital Signs (24h Range):  Temp:  [98.5 °F (36.9 °C)-99.1 °F (37.3 °C)] 98.9 °F (37.2 °C)  Pulse:  [] 136  Resp:  [14-22] 16  SpO2:  [99 %-100 %] 100 %  BP: (103-116)/(57-65) 103/59     Weight: 79.4 kg (175 lb)  Body mass index is 28.25 kg/m².      Intake/Output Summary  (Last 24 hours) at 12/03/17 1017  Last data filed at 12/03/17 0418   Gross per 24 hour   Intake              600 ml   Output                0 ml   Net              600 ml       Physical Exam   Constitutional: He is oriented to person, place, and time. No distress.   Cardiovascular: Regular rhythm.  Tachycardia present.    Pulmonary/Chest: Effort normal. He has decreased breath sounds in the right middle field and the right lower field. He has no wheezes.   Abdominal: Soft. He exhibits distension. There is tenderness (diffusely ). There is no rebound and no guarding.   Wound vac to incision   Musculoskeletal: He exhibits edema (2+ LE edema).   Neurological: He is alert and oriented to person, place, and time.   Skin: He is not diaphoretic.   Psychiatric: He has a normal mood and affect. His behavior is normal. Thought content normal.   Nursing note and vitals reviewed.      Laboratory:  CBC:     Recent Labs  Lab 12/03/17 0410   WBC 10.45   RBC 2.93*   HGB 9.0*   HCT 26.2*      MCV 89   MCH 30.7   MCHC 34.4     CMP:     Recent Labs  Lab 12/03/17 0410   *   CALCIUM 7.7*   ALBUMIN 1.8*   PROT 4.2*   *   K 3.1*   CO2 19*   CL 95   BUN 44*   CREATININE 4.3*   ALKPHOS 127   ALT <5*   AST 14   BILITOT 3.4*     Labs within the past 24 hours have been reviewed.    Diagnostic Results:  I have personally reviewed all pertinent imaging studies.    Assessment/Plan:     Seizure    - Cont keppra          Liver transplanted    - Tbili elevated 3.5 from 2.3. LFTs stable. CT abdomen reviewed.  - PBS consulted.  Plan tentatively for ERCP tomorrow.  NPO after MN.  Monitor  - para in am.          LAURA (acute kidney injury)    - Cr has never been below 4.    - Last HD 11/17   - UOP good.  Cont strict I/O's.            Prophylactic immunotherapy    - See long term immunosuppressant          Long-term use of immunosuppressant medication    Cont cyclo. Monitor for toxicities and adjust dose accordingly           Hyperbilirubinemia    - elevated t bili post liver txp in setting of abdominal pain, decreased appetitive and intermittent nausea.  PBS consulted to assess duct per ERCP.  - Para in am to assess ascites for t bili to r/o possible bile leak.            Peritonitis    - Diagnosed on previous admission.  Last para 11/29 with improving cell counts WBC 1400 with 45% PNMs   Was d/c'ed on augmentin, will switch to vanc/cef (see leukocytosis)             Leukocytosis    - WBC elevated to 12.7 from 6.5. Subjective fever 100.5 at home. Recent dx peritonitis on augmentin outpt, will broaden to vanc and cef.   - Infectious mosley initiated  - CT abdomen reviewed.  MIn creased ascites noted.          Delayed surgical wound healing    - Wound vac in place, last changed 12/2.          Fever    - cx pending.    - Augmentin changed to Vanc and Cefepime.            Tachycardia    - 1 L bolus ordered in ED.  EKG was sinus tach.  - Tele ordered.  Cont to monitor.              VTE Risk Mitigation         Ordered     heparin (porcine) injection 5,000 Units  Every 8 hours     Route:  Subcutaneous        12/03/17 0249     Medium Risk of VTE  Once      12/03/17 0249     Place sequential compression device  Until discontinued      12/03/17 0249          The patients clinical status was discussed at multidisplinary rounds, involving transplant surgery, transplant medicine, pharmacy, nursing, nutrition, and social work    Discharge Planning:  Monitor kidney function --> may need outpt HD  Monitor HH needs vs rehab for deconditioned status      Norma Blackwood, NP  Liver Transplant  [facility]

## 2017-12-03 NOTE — HPI
27 year old with acute alcoholic hepatitis who was transplant on 10/19/17 course complicated by seizures, ATN, wound infection and peritonitis. AES consulted for possible need for ERCP in the setting of increased bili and fevers.    History provided by patient.   Patient was admitted from 11/17-11/21 for a suspected wound infection, he was discharged on Augmentin.  He then returned for ongoing fevers and was in the hospital from 11/24-11/30 for peritonitis. He was treated with IV antibiotics and was sent home on Augmentin as repeat paracentesis showed improvement in counts.    He now presented to the ED due to continued fevers as well as generalized malaise. He states that when he is discharged he always feels well but after being home for a couple of days always has to return due to something (most recently it has been the events described above). Accompanied with his malaise he still reports diffuse abdominal pain which he is unsure if it is better/worse/same than pain he had during his last admission. He states that he has been taking his Augmentin. Denies any chest pain, shortness of breath, nausea, emesis, urinary symptoms or BRB/melena/diarrhea.    Imaging studies:  CT abdomen 12/3/17:  1.  Postoperative change of prior orthotopic liver transplant with persistent moderate to large volume of abdominopelvic ascites as well as nonspecific mesenteric haziness/edema.  2.  Small volume of right pleural fluid, decreased from prior study.  Right basilar atelectasis..  3.  Anasarca.  4.  Small volume of pericardial fluid.

## 2017-12-03 NOTE — PLAN OF CARE
Problem: Patient Care Overview  Goal: Plan of Care Review  Outcome: Ongoing (interventions implemented as appropriate)  Pt is AAOx4, VSS, and independent. HR is tachycardic-telemetry monitoring in place. Wound Vac site was redressed and switched to hospital unit at 125mmHg continuously. Administered 1000ml NS. Cefepime 1g administered IV. Heparin injection given. Pt will be NPO at midnight for ERCP and paracentesis tomorrow. Pt's bed in lowest position, call bell within reach, and nonskid socks on. Pt's mother at bed side. Will continue to monitor.

## 2017-12-03 NOTE — ED NOTES
Pt. Resting in bed in NAD. RR e/u. Continuous BP, cardiac, and O2 monitoring in progress. VS being monitoring continuously per MD orders. Pt. Offered bathroom assistance and denies need at this time. Explanation of care/wait provided. Bed in low, locked position with rails up and call bell in reach. Will continue to monitor.

## 2017-12-03 NOTE — ASSESSMENT & PLAN NOTE
- elevated t bili post liver txp in setting of abdominal pain, decreased appetitive and intermittent nausea.  PBS consulted to assess duct per ERCP.  - Para in am to assess ascites for t bili to r/o possible bile leak.

## 2017-12-03 NOTE — ED TRIAGE NOTES
Fever started yesterday evening      LOC: The patient is awake, alert, aware of environment with an appropriate affect. Oriented x4, speaking appropriately  APPEARANCE: Pt resting comfortably, in no acute distress, pt is clean and well groomed, clothing properly fastened  SKIN:The skin is warm and dry, color consistent with ethnicity, patient has normal skin turgor and moist mucus membranes  RESPIRATORY:Airway is open and patent, respirations are spontaneous, patient has a normal effort and rate, no accessory muscle use noted.  CARDIAC: Normal rate and rhythm, peripheral edema noted, capillary refill < 3 seconds, bilateral radial pulses 2+.  ABDOMEN: distended  NEUROLOGIC: PERRLA, facial expression is symmetrical, patient moving all extremities spontaneously, normal sensation in all extremities when touched with a finger.  Follows all commands appropriately  MUSCULOSKELETAL: Patient moving all extremities spontaneously, bilateral lower swelling

## 2017-12-03 NOTE — ED NOTES
Daughter present bedside- not wanting blood/IV done and said she's only here for a catheter replacement.  Daughter sts it fell out in the shower tonight around 9pm. Pt. Resting in bed in NAD. RR e/u. Continuous Bp,cardiac, and O2 monitoring in progress. VS being monitoring continuously per MD orders. Pt. Offered bathroom assistance and denies need at this time. Explanation of care/wait provided. Bed in low, locked position with rails up and call bell in reach. Will continue to monitor.

## 2017-12-03 NOTE — ASSESSMENT & PLAN NOTE
- WBC elevated to 12.7 from 6.5. Subjective fever 100.5 at home. Recent dx peritonitis on augmentin outpt, will broaden to vanc and cef.   - Infectious mosley initiated  - CT abdomen reviewed.  MIn creased ascites noted.

## 2017-12-03 NOTE — ASSESSMENT & PLAN NOTE
Diagnosed on previous admission  Last para 11/29 with improving cell counts WBC 1400 with 45% PNMs   Was d/c'ed on augmentin, will switch to vanc/cef (see leukocytosis)

## 2017-12-03 NOTE — ASSESSMENT & PLAN NOTE
WBC elevated to 12.7 from 6.5  Subjective fever 100.5 at home  Recent dx peritonitis on augmentin outpt, will broaden to vanc and cef.   Infectious mosley initiated  CT abdomen ordered

## 2017-12-03 NOTE — H&P
[facility]  Liver Transplant  History & Physical    Patient Name: Jhonny Diana  MRN: [id number]  Admission Date: 12/3/2017  Code Status: Prior  Primary Care Provider: Primary Doctor No  Post-Operative Day: 45     ORGAN:   LIVER  Disease Etiology: Acute Alcoholic Hepatitis  Donor Type:    - Brain Death  CDC High Risk:   No  Donor CMV Status:   Donor CMV Status: Positive  Donor HBcAB:   Negative  Donor HCV Status:   Negative  Whole or Partial: Whole Liver  Biliary Anastomosis: End to End  Arterial Anatomy: Standard    Subjective:     History of Present Illness:  Mr. Diana is a 28yo man w/a history of alcoholic cirrhosis s/p DDLT 10/19/2017; c/b seizures (swtiched off prograf to cyclo), ATN requiring HD (last HD ), superificial wound infection s/p wound vac to chevron incision, and recent admit for fevers on  (discharged on empiric augmentin for suspected superficial wound infection) and readmitted  again with fever. Found to have peritonitis (WBC 5000, 75% PNM) neg for bile leak. He was treated initially with vanc/cefepime. Repeat cell counts  with some improvement (WBC 1400, 45% PNM). Pt transitioned to augmentin upon d/c .      Pt presented to the ED this morning with subjective fever (100.5) and general malaise beginning at 8pm yesterday. His abdomen is soft, but diffusely tender. Labs reveal elevated WBC count to 12.7 from 6.5 in addition to elevated tbili 3.5 from 2.3 when compared to labs 3 days ago, despite being on augmentin for recent diagnosis of peritonitis. He denies nausea, vomiting, diarrhea, congestion, cough, CP or SOB. He has received 1 L bolus NS in the ED. Infectious mosley intiated, will start BS abx (vanc/cef) and obtain CT abdomen.     He has a wound vac in place that was changed yesterday.    Past Medical History:   Diagnosis Date    Alcoholic hepatitis with ascites     Alcoholic hepatitis with ascites     History of hematemesis 2017     Hypertension     Renal disorder        Past Surgical History:   Procedure Laterality Date    APPENDECTOMY         Review of patient's allergies indicates:   Allergen Reactions    Bactrim [sulfamethoxazole-trimethoprim] Other (See Comments)     Mookie Trell Syndrome       Family History     None        Social History Main Topics    Smoking status: Former Smoker     Types: Cigarettes    Smokeless tobacco: Never Used    Alcohol use No      Comment: a fifth of liquor daily for years, cut back over last 2 months    Drug use: No    Sexual activity: Not on file         (Not in a hospital admission)    Review of Systems   Constitutional: Positive for activity change, fatigue and fever. Negative for chills.   Respiratory: Negative for cough, shortness of breath and wheezing.    Cardiovascular: Positive for leg swelling.   Gastrointestinal: Positive for abdominal pain and constipation. Negative for nausea and vomiting.   Genitourinary: Negative for difficulty urinating and dysuria.   Skin: Positive for wound.   Allergic/Immunologic: Positive for immunocompromised state.   Neurological: Positive for weakness.   Psychiatric/Behavioral: Negative for confusion and decreased concentration. The patient is nervous/anxious.      Objective:     Vital Signs (Most Recent):  Temp: 98.7 °F (37.1 °C) (12/02/17 2355)  Pulse: 102 (12/03/17 0232)  Resp: 16 (12/03/17 0232)  BP: 111/60 (12/03/17 0232)  SpO2: 100 % (12/03/17 0232) Vital Signs (24h Range):  Temp:  [98.7 °F (37.1 °C)] 98.7 °F (37.1 °C)  Pulse:  [102-115] 102  Resp:  [15-22] 16  SpO2:  [99 %-100 %] 100 %  BP: (105-112)/(59-62) 111/60     Weight: 79.4 kg (175 lb)  Body mass index is 28.25 kg/m².    No intake or output data in the 24 hours ending 12/03/17 0240    Physical Exam   Constitutional: He is oriented to person, place, and time. No distress.   Cardiovascular: Regular rhythm.  Tachycardia present.    Pulmonary/Chest: Effort normal. He has decreased breath sounds in  the right middle field and the right lower field. He has no wheezes.   Abdominal: Soft. He exhibits distension. There is tenderness (diffusely ). There is no rebound and no guarding.   Wound vac to incision   Musculoskeletal: He exhibits edema (2+ LE edema).   Neurological: He is alert and oriented to person, place, and time.   Skin: He is not diaphoretic.   Psychiatric: He has a normal mood and affect. His behavior is normal. Thought content normal.   Nursing note and vitals reviewed.      Laboratory:  CBC:   Recent Labs  Lab 12/03/17  0117   WBC 12.79*   RBC 3.43*   HGB 10.5*   HCT 30.2*      MCV 88   MCH 30.6   MCHC 34.8     CMP:   Recent Labs  Lab 12/03/17 0117   GLU 96   CALCIUM 8.1*   ALBUMIN 2.1*   PROT 4.9*   *   K 3.3*   CO2 19*   CL 91*   BUN 44*   CREATININE 4.5*   ALKPHOS 149*   ALT <5*   AST 15   BILITOT 3.5*     Labs within the past 24 hours have been reviewed.    Diagnostic Results:  I have personally reviewed all pertinent imaging studies.    Assessment/Plan:     Leukocytosis    WBC elevated to 12.7 from 6.5  Subjective fever 100.5 at home  Recent dx peritonitis on augmentin outpt, will broaden to vanc and cef.   Infectious mosley initiated  CT abdomen ordered           Liver transplanted    Tbili elevated 3.5 from 2.3. LFTs stable. CT abdomen ordered. Monitor          Peritonitis    Diagnosed on previous admission  Last para 11/29 with improving cell counts WBC 1400 with 45% PNMs   Was d/c'ed on augmentin, will switch to vanc/cef (see leukocytosis)             LAURA (acute kidney injury)    Cr 4.5, has remained elevated since txp  Last HD 11/17           Seizure    Cont keppra          Long-term use of immunosuppressant medication    Cont cyclo. Monitor for toxicities and adjust dose accordingly          Prophylactic immunotherapy    See long term immunosuppressant          Delayed surgical wound healing    Wound vac in place, last changed 12/2.          Tachycardia    1 L bolus ordered in  ED  EKG sinus tach  monitor                    Discharge Planning:  Monitor kidney function --> may need outpt HD  Monitor HH needs vs rehab for deconditioned status      Jenna M Oppenheimer, PA-C  Liver Transplant  [facility]

## 2017-12-03 NOTE — ED NOTES
Pt placed on cardiac monitor, continuous pulse ox, cycling blood pressures. Side rails up x2, call bell in reach, bed in low position with brake engaged. Family at bedside.

## 2017-12-03 NOTE — HPI
Mr. Diana is a 26yo man w/a history of alcoholic cirrhosis s/p DDLT 10/19/2017; c/b seizures (swtiched off prograf to cyclo), ATN requiring HD (last HD 11/17), superificial wound infection s/p wound vac to chevron incision, and recent admit for fevers on 11/21 (discharged on empiric augmentin for suspected superficial wound infection) and readmitted 11/24 again with fever. Found to have peritonitis (WBC 5000, 75% PNM) neg for bile leak. He was treated initially with vanc/cefepime. Repeat cell counts 11/29 with some improvement (WBC 1400, 45% PNM). Pt transitioned to augmentin upon d/c 11/30.      Pt presented to the ED 12/3 morning with subjective fever (100.5) and general malaise beginning at 8pm day prior. His abdomen soft, but diffusely tender. Labs reveal elevated WBC count to 12.7 from 6.5 in addition to elevated tbili 3.5 from 2.3 when compared to labs 3 days ago, despite being on augmentin for recent diagnosis of peritonitis. He denied nausea, vomiting, diarrhea, congestion, cough, CP or SOB. He received 1 L bolus NS in the ED. Infectious mosley intiated, BS abx (vanc/cef) started, and obtained CT abdomen.     He has a wound vac in place.

## 2017-12-03 NOTE — SUBJECTIVE & OBJECTIVE
Past Medical History:   Diagnosis Date    Alcoholic hepatitis with ascites     Alcoholic hepatitis with ascites     History of hematemesis 9/28/2017    Hypertension     Renal disorder        Past Surgical History:   Procedure Laterality Date    APPENDECTOMY         Review of patient's allergies indicates:   Allergen Reactions    Bactrim [sulfamethoxazole-trimethoprim] Other (See Comments)     Mookie Trell Syndrome       Family History     None        Social History Main Topics    Smoking status: Former Smoker     Types: Cigarettes    Smokeless tobacco: Never Used    Alcohol use No      Comment: a fifth of liquor daily for years, cut back over last 2 months    Drug use: No    Sexual activity: Not on file       PTA Medications   Medication Sig    amoxicillin-clavulanate 500-125mg (AUGMENTIN) 500-125 mg Tab Take 1 tablet (500 mg total) by mouth 2 (two) times daily.    aspirin (ECOTRIN) 81 MG EC tablet Take 1 tablet (81 mg total) by mouth once daily.    atovaquone (MEPRON) 750 mg/5 mL Susp Take 10 mLs (1,500 mg total) by mouth once daily. Stop on 4/17/18    cycloSPORINE modified, NEORAL, (NEORAL) 100 MG capsule Take 1 capsule (100 mg total) by mouth 2 (two) times daily.    docusate sodium (COLACE) 100 MG capsule Take 100 mg by mouth 3 (three) times daily as needed for Constipation.    ergocalciferol (ERGOCALCIFEROL) 50,000 unit Cap Take 1 capsule (50,000 Units total) by mouth every 7 days.    levETIRAcetam (KEPPRA) 500 MG Tab Take 1 tablet (500 mg total) by mouth 2 (two) times daily.    multivitamin (THERAGRAN) tablet Take 1 tablet by mouth once daily.    mycophenolate (CELLCEPT) 250 mg Cap Take 2 capsules (500 mg total) by mouth 2 (two) times daily.    ondansetron (ZOFRAN-ODT) 8 MG TbDL Take 1 tablet (8 mg total) by mouth every 8 (eight) hours as needed (nausea).    oxyCODONE-acetaminophen (PERCOCET)  mg per tablet Take 0.5-1 tablets by mouth every 4 (four) hours as needed for Pain.     pantoprazole (PROTONIX) 40 MG tablet Take 1 tablet (40 mg total) by mouth once daily.    predniSONE (DELTASONE) 10 MG tablet Take 20mg PO QD 10/29-11/4; 15mg PO QD 11/5-11/11; 10mg PO QD 11/12-11/18; 5mg PO QD 11/19-11/25; 2.5mg PO QD 11/26-12/2; stop on 12/2/17    sodium bicarbonate 650 MG tablet Take 2 tablets (1,300 mg total) by mouth 2 (two) times daily.    thiamine 100 MG tablet Take 1 tablet (100 mg total) by mouth once daily.    valGANciclovir (VALCYTE) 450 mg Tab Take 1 tablet (450 mg total) by mouth every Mon, Wed, Fri. Stop on 1/17/18       Review of Systems   Constitutional: Positive for activity change, fatigue and fever. Negative for chills.   Respiratory: Negative for cough, shortness of breath and wheezing.    Cardiovascular: Positive for leg swelling.   Gastrointestinal: Positive for abdominal pain and constipation. Negative for nausea and vomiting.   Genitourinary: Negative for difficulty urinating and dysuria.   Skin: Positive for wound.   Allergic/Immunologic: Positive for immunocompromised state.   Neurological: Positive for weakness.   Psychiatric/Behavioral: Negative for confusion and decreased concentration. The patient is nervous/anxious.      Objective:     Vital Signs (Most Recent):  Temp: 98.9 °F (37.2 °C) (12/03/17 1014)  Pulse: (!) 136 (12/03/17 1014)  Resp: 16 (12/03/17 1014)  BP: (!) 103/59 (12/03/17 1014)  SpO2: 100 % (12/03/17 1014) Vital Signs (24h Range):  Temp:  [98.5 °F (36.9 °C)-99.1 °F (37.3 °C)] 98.9 °F (37.2 °C)  Pulse:  [] 136  Resp:  [14-22] 16  SpO2:  [99 %-100 %] 100 %  BP: (103-116)/(57-65) 103/59     Weight: 79.4 kg (175 lb)  Body mass index is 28.25 kg/m².      Intake/Output Summary (Last 24 hours) at 12/03/17 1017  Last data filed at 12/03/17 0418   Gross per 24 hour   Intake              600 ml   Output                0 ml   Net              600 ml       Physical Exam   Constitutional: He is oriented to person, place, and time. No distress.    Cardiovascular: Regular rhythm.  Tachycardia present.    Pulmonary/Chest: Effort normal. He has decreased breath sounds in the right middle field and the right lower field. He has no wheezes.   Abdominal: Soft. He exhibits distension. There is tenderness (diffusely ). There is no rebound and no guarding.   Wound vac to incision   Musculoskeletal: He exhibits edema (2+ LE edema).   Neurological: He is alert and oriented to person, place, and time.   Skin: He is not diaphoretic.   Psychiatric: He has a normal mood and affect. His behavior is normal. Thought content normal.   Nursing note and vitals reviewed.      Laboratory:  CBC:     Recent Labs  Lab 12/03/17  0410   WBC 10.45   RBC 2.93*   HGB 9.0*   HCT 26.2*      MCV 89   MCH 30.7   MCHC 34.4     CMP:     Recent Labs  Lab 12/03/17  0410   *   CALCIUM 7.7*   ALBUMIN 1.8*   PROT 4.2*   *   K 3.1*   CO2 19*   CL 95   BUN 44*   CREATININE 4.3*   ALKPHOS 127   ALT <5*   AST 14   BILITOT 3.4*     Labs within the past 24 hours have been reviewed.    Diagnostic Results:  I have personally reviewed all pertinent imaging studies.

## 2017-12-03 NOTE — ASSESSMENT & PLAN NOTE
- Diagnosed on previous admission.  Last para 11/29 with improving cell counts WBC 1400 with 45% PNMs   Was d/c'ed on augmentin, will switch to vanc/cef (see leukocytosis)

## 2017-12-03 NOTE — TELEPHONE ENCOUNTER
Reason for Disposition   Transplant patient (e.g., kidney, liver, lung, heart)    Protocols used: ST FEVER-A-AH    Patient called to report that he a temp of 100.7 degrees. Patient took tylenol but fever still present. Patient advised to go to ED and he verbalized understanding.

## 2017-12-03 NOTE — ASSESSMENT & PLAN NOTE
27 year old with acute alcoholic hepatitis who was transplant on 10/19/17 course complicated by seizures, ATN, wound infection and peritonitis. AES consulted for possible need for ERCP in the setting of increased bili and fevers. Of note patient has had 2 recent admissions one for a superficial wound infection and another one for peritonitis. He was admitted for peritonitis from 11/24-11/30 for peritonitis. Upon discharge patient stated that he felt well but last night once again started to have fevers. In the setting of recent transplant, continued fevers, and elevated bili it is not unreasonable perform an ERCP and make sure there are no strictures. Also would recommend a repeat paracentesis.     Recommendations:  -NPO after MN for tentative ERCP in AM  -Also recommend performing a repeat paracentesis in the setting of continued tenderness and history of peritonitis  -Case discussed with attending

## 2017-12-03 NOTE — HPI
Mr. Diana is a 26yo man w/a history of alcoholic cirrhosis s/p DDLT 10/19/2017; c/b seizures (swtiched off prograf to cyclo), ATN requiring HD (last HD 11/17), superificial wound infection s/p wound vac to chevron incision, and recent admit for fevers on 11/21 (discharged on empiric augmentin for suspected superficial wound infection) and readmitted 11/24 again with fever. Found to have peritonitis (WBC 5000, 75% PNM) neg for bile leak. He was treated initially with vanc/cefepime. Repeat cell counts 11/29 with some improvement (WBC 1400, 45% PNM). Pt transitioned to augmentin upon d/c 11/30.      Pt presented to the ED this morning with subjective fever (100.5) and general malaise beginning at 8pm yesterday. His abdomen is soft, but diffusely tender. Labs reveal elevated WBC count to 12.7 from 6.5 in addition to elevated tbili 3.5 from 2.3 when compared to labs 3 days ago, despite being on augmentin for recent diagnosis of peritonitis. He denies nausea, vomiting, diarrhea, congestion, cough, CP or SOB. He has received 1 L bolus NS in the ED.

## 2017-12-03 NOTE — SUBJECTIVE & OBJECTIVE
Past Medical History:   Diagnosis Date    Alcoholic hepatitis with ascites     Alcoholic hepatitis with ascites     History of hematemesis 9/28/2017    Hypertension     Renal disorder        Past Surgical History:   Procedure Laterality Date    APPENDECTOMY         Review of patient's allergies indicates:   Allergen Reactions    Bactrim [sulfamethoxazole-trimethoprim] Other (See Comments)     Mookie Trell Syndrome     Family History     None        Social History Main Topics    Smoking status: Former Smoker     Types: Cigarettes    Smokeless tobacco: Never Used    Alcohol use No      Comment: a fifth of liquor daily for years, cut back over last 2 months    Drug use: No    Sexual activity: Not on file     Review of Systems   Constitutional: Positive for chills, fatigue and fever. Negative for activity change and appetite change.   HENT: Negative for trouble swallowing.    Eyes: Negative.    Respiratory: Negative.    Cardiovascular: Negative.    Gastrointestinal: Positive for abdominal distention and abdominal pain. Negative for anal bleeding, blood in stool, constipation, diarrhea, nausea, rectal pain and vomiting.   Endocrine: Negative.    Genitourinary: Negative.    Neurological: Negative.      Objective:     Vital Signs (Most Recent):  Temp: 99.2 °F (37.3 °C) (12/03/17 1130)  Pulse: 110 (12/03/17 1130)  Resp: 18 (12/03/17 1130)  BP: 117/64 (12/03/17 1130)  SpO2: 99 % (12/03/17 1130) Vital Signs (24h Range):  Temp:  [98.5 °F (36.9 °C)-99.2 °F (37.3 °C)] 99.2 °F (37.3 °C)  Pulse:  [] 110  Resp:  [14-22] 18  SpO2:  [99 %-100 %] 99 %  BP: (103-117)/(57-65) 117/64     Weight: 79.4 kg (175 lb) (12/02/17 8025)  Body mass index is 28.25 kg/m².      Intake/Output Summary (Last 24 hours) at 12/03/17 1133  Last data filed at 12/03/17 1993   Gross per 24 hour   Intake              600 ml   Output                0 ml   Net              600 ml       Lines/Drains/Airways     Central Venous Catheter Line                  Hemodialysis Catheter 10/30/17 1140 right internal jugular 34 days          Peripheral Intravenous Line                 Peripheral IV - Single Lumen 12/03/17 0116 Left Hand less than 1 day         Peripheral IV - Single Lumen 12/03/17 0121 Right Hand less than 1 day                Physical Exam   Constitutional: He is oriented to person, place, and time. No distress.   HENT:   Head: Normocephalic and atraumatic.   Eyes: No scleral icterus.   Neck: Normal range of motion.   Cardiovascular:   Sinus tachycardia     Pulmonary/Chest: Effort normal and breath sounds normal.   Abdominal: Soft. Bowel sounds are normal. He exhibits distension. He exhibits no mass. There is tenderness. There is no rebound and no guarding. No hernia.   Patient has wound vac along chevron incision form transplant   Musculoskeletal: Normal range of motion. He exhibits edema.   Neurological: He is alert and oriented to person, place, and time.   Skin: He is not diaphoretic.       Significant Labs:  CBC:   Recent Labs  Lab 12/03/17  0117 12/03/17  0410 12/03/17  1105   WBC 12.79* 10.45 10.91   HGB 10.5* 9.0* 10.8*   HCT 30.2* 26.2* 31.6*    272 308     CMP:   Recent Labs  Lab 12/03/17  1105   GLU 98   CALCIUM 8.3*   ALBUMIN 2.0*   PROT 4.8*   *   K 3.4*   CO2 20*   CL 93*   BUN 45*   CREATININE 4.4*   ALKPHOS 148*   ALT <5*   AST 16   BILITOT 3.6*     Coagulation:   Recent Labs  Lab 12/03/17  0410   INR 1.4*       Significant Imaging:  Imaging results within the past 24 hours have been reviewed.

## 2017-12-03 NOTE — ED PROVIDER NOTES
Encounter Date: 12/2/2017       History     Chief Complaint   Patient presents with    Fever     Fever of 109 at home. Hx of Liver txp.      27-year-old male with medical history of hypertension, status post liver transplant on 10/19 presents to the ED with a fever.  Patient was admitted to hospital on 11/24.  Peritonitis and was discharged yesterday with Augmentin.  Patient states he took his temperature this evening at 8:00 and noted it to be elevated. Tmax 100.9. Patient reports taking 1.5 Percocets. Patient endorses fatigue and nausea. Patient denies chest pain, shortness of breath, weakness, syncope, headache, sore throat, cough, abdominal pain, changes in bowel, changes in urination.          Review of patient's allergies indicates:   Allergen Reactions    Bactrim [sulfamethoxazole-trimethoprim] Other (See Comments)     Mookie Trell Syndrome     Past Medical History:   Diagnosis Date    Alcoholic hepatitis with ascites     Alcoholic hepatitis with ascites     History of hematemesis 9/28/2017    Hypertension     Renal disorder      Past Surgical History:   Procedure Laterality Date    APPENDECTOMY       History reviewed. No pertinent family history.  Social History   Substance Use Topics    Smoking status: Former Smoker     Types: Cigarettes    Smokeless tobacco: Never Used    Alcohol use No      Comment: a fifth of liquor daily for years, cut back over last 2 months     Review of Systems   Constitutional: Positive for fatigue and fever. Negative for chills and diaphoresis.   HENT: Negative for congestion and sore throat.    Eyes: Negative for visual disturbance.   Respiratory: Negative for cough and shortness of breath.    Cardiovascular: Negative for chest pain and leg swelling.   Gastrointestinal: Positive for nausea. Negative for abdominal distention, constipation, diarrhea and vomiting.   Genitourinary: Negative for dysuria, flank pain and hematuria.   Musculoskeletal: Negative for neck pain.    Skin: Negative for rash.   Neurological: Negative for syncope, weakness, light-headedness and headaches.   Psychiatric/Behavioral: The patient is not nervous/anxious.        Physical Exam     Initial Vitals [12/02/17 2355]   BP Pulse Resp Temp SpO2   (!) 105/59 107 20 98.7 °F (37.1 °C) 99 %      MAP       74.33         Physical Exam    Vitals reviewed.  Constitutional: He appears well-developed and well-nourished. He is not diaphoretic. No distress.   HENT:   Head: Normocephalic and atraumatic.   Nose: Nose normal.   Mouth/Throat: Oropharynx is clear and moist. No oropharyngeal exudate.   Eyes: Conjunctivae and EOM are normal. Pupils are equal, round, and reactive to light.   Neck: Normal range of motion. Neck supple. No thyromegaly present.   Cardiovascular: Intact distal pulses. Tachycardia present.    Pulmonary/Chest: Breath sounds normal. No respiratory distress. He has no wheezes. He exhibits no tenderness.   Abdominal: Soft. Bowel sounds are normal. He exhibits distension. There is no tenderness. There is no rebound.   Wound vac across upper abdomen with no drainage or surrounding erythema   Musculoskeletal: Normal range of motion. He exhibits no tenderness.   Lymphadenopathy:     He has no cervical adenopathy.   Neurological: He is alert and oriented to person, place, and time. He has normal strength. No cranial nerve deficit or sensory deficit.   Skin: Skin is warm and dry. No rash noted.   Psychiatric: He has a normal mood and affect.         ED Course   Procedures  Labs Reviewed   CBC W/ AUTO DIFFERENTIAL - Abnormal; Notable for the following:        Result Value    WBC 12.79 (*)     RBC 3.43 (*)     Hemoglobin 10.5 (*)     Hematocrit 30.2 (*)     RDW 17.1 (*)     Lymph% 15.0 (*)     Mono% 18.0 (*)     All other components within normal limits   COMPREHENSIVE METABOLIC PANEL - Abnormal; Notable for the following:     Sodium 127 (*)     Potassium 3.3 (*)     Chloride 91 (*)     CO2 19 (*)     BUN, Bld 44  (*)     Creatinine 4.5 (*)     Calcium 8.1 (*)     Total Protein 4.9 (*)     Albumin 2.1 (*)     Total Bilirubin 3.5 (*)     Alkaline Phosphatase 149 (*)     ALT <5 (*)     Anion Gap 17 (*)     eGFR if  19.1 (*)     eGFR if non  16.5 (*)     All other components within normal limits   URINALYSIS, REFLEX TO URINE CULTURE - Abnormal; Notable for the following:     Appearance, UA Hazy (*)     Protein, UA 1+ (*)     Occult Blood UA 1+ (*)     All other components within normal limits    Narrative:     Preferred Collection Type->Urine, Clean Catch   BILIRUBIN, DIRECT - Abnormal; Notable for the following:     Bilirubin, Direct 2.8 (*)     All other components within normal limits   PROTIME-INR - Abnormal; Notable for the following:     Prothrombin Time 14.3 (*)     INR 1.4 (*)     All other components within normal limits   URINALYSIS MICROSCOPIC - Abnormal; Notable for the following:     Bacteria, UA Moderate (*)     Hyaline Casts, UA 3 (*)     All other components within normal limits    Narrative:     Preferred Collection Type->Urine, Clean Catch   COMPREHENSIVE METABOLIC PANEL - Abnormal; Notable for the following:     Sodium 128 (*)     Potassium 3.1 (*)     CO2 19 (*)     Glucose 123 (*)     BUN, Bld 44 (*)     Creatinine 4.3 (*)     Calcium 7.7 (*)     Total Protein 4.2 (*)     Albumin 1.8 (*)     Total Bilirubin 3.4 (*)     ALT <5 (*)     eGFR if  20.2 (*)     eGFR if non  17.5 (*)     All other components within normal limits   CBC W/ AUTO DIFFERENTIAL - Abnormal; Notable for the following:     RBC 2.93 (*)     Hemoglobin 9.0 (*)     Hematocrit 26.2 (*)     RDW 17.0 (*)     Gran% 86.0 (*)     Lymph% 6.0 (*)     All other components within normal limits   PROTIME-INR - Abnormal; Notable for the following:     Prothrombin Time 14.7 (*)     INR 1.4 (*)     All other components within normal limits   CULTURE, BLOOD   CULTURE, BLOOD   CULTURE, URINE    CULTURE, URINE   MAGNESIUM   PHOSPHORUS   LACTIC ACID, PLASMA   INFLUENZA A AND B ANTIGEN   PHOSPHORUS   MAGNESIUM   CYCLOSPORINE LEVEL             Medical Decision Making:   History:   Old Medical Records: I decided to obtain old medical records.  Old Records Summarized: records from clinic visits.  Clinical Tests:   Lab Tests: Ordered and Reviewed  Radiological Study: Ordered and Reviewed  Medical Tests: Ordered and Reviewed       APC / Resident Notes:   27-year-old male with medical history of hypertension, status post liver transplant on 10/19 presents to the ED with a fever.    DDX includes but is not limited to peritonitis, bacteremia, influenza, UTI, electrolyte abnormality. Will get basic labs, blood cultures, cyclosporine level, direct bili, UA, EKG and consult liver transplant.     I have discussed and reviewed with my supervising physician.    Patient signed out to Dr. Orantes.  Advised to follow transplant's recommendations.     2:28 AM  Transplant evaluated patient and will continue work up with CT scan. Plan for admission.      Attending Attestation:     Physician Attestation Statement for NP/PA:   I discussed this assessment and plan of this patient with the NP/PA, but I did not personally examine the patient. The face to face encounter was performed by the NP/PA.                  ED Course      Clinical Impression:   The primary encounter diagnosis was Fever. Diagnoses of LAURA (acute kidney injury), Fever, unspecified fever cause, Leukocytosis, unspecified type, Liver transplanted, Long-term use of immunosuppressant medication, Peritonitis, Prophylactic immunotherapy, Seizure, Tachycardia, and Hyperbilirubinemia were also pertinent to this visit.    Disposition:   Disposition: Admitted  Condition: Stable  Case discussed with transplant who will begin antibiotics and admit to their service.                        Ninoska Black PA-C  12/03/17 1612       Nam Joel MD  12/04/17  [id number]

## 2017-12-03 NOTE — HOSPITAL COURSE
Interval History:      12/8: Pt in slightly better spirits today, joking with team. Pt remains with abdominal pain which is improving as peritonitis continues to resolve (last para ). Bili trending down at 5.2 today, cont ursodiol. PO intake remains poor, will place SHANE tube and initiate tube feeds today, orders placed. Will give albumin x1. Per nephrology will plan for HD today. H/H low on morning labs will plan for 1 unit PRBC with HD. Of note, TSH checked yesterday, level of 11.4 with T4 <3.0, endo consulted- will start levothyroxine today. Cont to encourage PO intake and ambulation. PT ordered 12/7/17, unable to see patient today - will work with pt this weekend. Monitor.     12/9:  Pt with signif nausea and abd pain this AM.  D/c TF.  Place central line and will initiate TPN.  CT A/P obtained today with signif ascites.  Plan IR vs washout in OR.  ID consulted and recommend washout.  Pt transitioned to Zosyn and started on isavu.  Will change PO meds to shane as much as able.      12/10:  Pt with signif improvement today in nausea and feels hungry.  CT A/P 12/9 with signif ascites.  Plan for IR para with drain placement in AM.  Continue Zosyn/Vanc/Isavu.  Will reorder TPN.  D/c shane in AM if not going to transition back to TF.    12/11:  D/c shane.  Continue TPN.  Nausea still improved.  Continue current abx.  Para today with 4.5 L removed.  HD cancelled.  Ascitic fluid labs pending.  Pt NPO after midnight.  Blood/urine cx 12/9 NGTD.    12/12:  Cell count negative from para yesterday.  Pt without nausea today- was able to eat a small amount.  Calorie count initiated.  Plan for IR drain placement tomorrow.  HD today with blood transfusion.    12/13:  Drain placed in IR this AM with WBC/diff negative for infection.  2.5 L total removed- replaced with albumin 25% 100 cc.  Will type and screen tomorrow and tx if < 8 in HD.  Remains slightly tachy/hypotensive but improved.  Eating minimally- reorder TPN.   "    12/14: no acute events over night.  Drain output decreased this am.  Reports eating better per patient and mother.  Continue current abx & tpn.    12/15: HD 12/14.  Wound vac dressing changed today.  Continue to encourage oral intake.  Stop TPN.  Transition to oral antibiotics today per ID recs. Likely will need transfusion with HD in am.    12/16/17: Transfuse 1 unit prbc with hd today.  No acute events over night.  Continues to report "feeling fullness."  Will obtain abdominal/liver ultrasound to assess for fluid collections.    12/17:  Blood counts responded appropriately to transfusion.  Possible IR drainage in am for fluid collection noted on ultrasound.  Obtain KUB to assess for ileus- give suppository.  Consider starting appetite stimulant.    12/18: plan IR drainage today of fluid collection on right side seen on ultrasound.  If fluid not infected plan to d/c both drains in am.    12/19: Appetite improved.  Continue remeron for appetite stimulant.  Both drains removed yesterday without complication.  HD and wound vac dressing change today.  Possible d/c in am if eating well.  "

## 2017-12-03 NOTE — ASSESSMENT & PLAN NOTE
- Tbili elevated 3.5 from 2.3. LFTs stable. CT abdomen reviewed.  - PBS consulted.  Plan tentatively for ERCP tomorrow.  NPO after MN.  Monitor  - para in am.

## 2017-12-03 NOTE — CONSULTS
[facility]  Nephrology  Consult Note    Patient Name: Jhonny Diana  MRN: [id number]  Admission Date: 12/3/2017  Hospital Length of Stay: 0 days  Attending Provider: Solis Chapa MD   Primary Care Physician: Primary Doctor No  Principal Problem:<principal problem not specified>    Inpatient consult to Nephrology  Consult performed by: DIETER CHEN  Consult ordered by: OPPENHEIMER, JENNA M.  Reason for consult: a        Subjective:     HPI: Mr. Diana is a 26yo man w/a history of alcoholic cirrhosis s/p DDLT 10/19/2017; c/b seizures (swtiched off prograf to cyclo), ATN requiring HD (last HD 11/17), superificial wound infection s/p wound vac to chevron incision, and recent admit for fevers on 11/21 (discharged on empiric augmentin for suspected superficial wound infection) and readmitted 11/24 again with fever. Found to have peritonitis (WBC 5000, 75% PNM) neg for bile leak. He was treated initially with vanc/cefepime. Repeat cell counts 11/29 with some improvement (WBC 1400, 45% PNM). Pt transitioned to augmentin upon d/c 11/30.      Pt presented to the ED this morning with subjective fever (100.5) and general malaise beginning at 8pm yesterday. His abdomen is soft, but diffusely tender. Labs reveal elevated WBC count to 12.7 from 6.5 in addition to elevated tbili 3.5 from 2.3 when compared to labs 3 days ago, despite being on augmentin for recent diagnosis of peritonitis. He denies nausea, vomiting, diarrhea, congestion, cough, CP or SOB. He has received 1 L bolus NS in the ED.    Past Medical History:   Diagnosis Date    Alcoholic hepatitis with ascites     Alcoholic hepatitis with ascites     History of hematemesis 9/28/2017    Hypertension     Renal disorder        Past Surgical History:   Procedure Laterality Date    APPENDECTOMY         Review of patient's allergies indicates:   Allergen Reactions    Bactrim [sulfamethoxazole-trimethoprim] Other (See Comments)     Mookie Cai  Syndrome     Current Facility-Administered Medications   Medication Frequency    0.9%  NaCl infusion Continuous    acetaminophen tablet 650 mg Q8H PRN    atovaquone suspension 1,500 mg Daily    cefepime in dextrose 5 % 1 gram/50 mL IVPB 1 g Q12H    cycloSPORINE modified (NEORAL) capsule 100 mg BID    docusate sodium capsule 100 mg TID PRN    [START ON 12/5/2017] ergocalciferol capsule 50,000 Units Q7 Days    heparin (porcine) injection 5,000 Units Q8H    levETIRAcetam tablet 500 mg BID    multivitamin tablet 1 tablet Daily    ondansetron disintegrating tablet 8 mg Q8H PRN    ondansetron disintegrating tablet 8 mg Q8H PRN    oxyCODONE immediate release tablet 10 mg Q6H PRN    oxyCODONE immediate release tablet 5 mg Q6H PRN    pantoprazole EC tablet 40 mg Daily    ramelteon tablet 8 mg Nightly PRN    sodium bicarbonate tablet 1,300 mg BID    sodium chloride 0.9% flush 3 mL PRN    thiamine tablet 100 mg Daily    [START ON 12/4/2017] valGANciclovir tablet 450 mg Every Mon, Wed, Fri    vancomycin 1 g in dextrose 5 % 250 mL IVPB (ready to mix system) Q24H     Family History     None        Social History Main Topics    Smoking status: Former Smoker     Types: Cigarettes    Smokeless tobacco: Never Used    Alcohol use No      Comment: a fifth of liquor daily for years, cut back over last 2 months    Drug use: No    Sexual activity: Not on file     Review of Systems   Constitutional: Positive for chills, fatigue and fever. Negative for activity change and appetite change.   HENT: Negative for trouble swallowing.    Eyes: Negative.    Respiratory: Negative.    Cardiovascular: Negative.    Gastrointestinal: Positive for abdominal distention and abdominal pain. Negative for anal bleeding, blood in stool, constipation, diarrhea, nausea, rectal pain and vomiting.   Endocrine: Negative.    Genitourinary: Negative.    Neurological: Negative.      Objective:     Vital Signs (Most Recent):  Temp: 99.9 °F  (37.7 °C) (12/03/17 1610)  Pulse: (!) 112 (12/03/17 1611)  Resp: 16 (12/03/17 1610)  BP: (!) 105/55 (12/03/17 1611)  SpO2: 98 % (12/03/17 1611)  O2 Device (Oxygen Therapy): room air (12/03/17 1610) Vital Signs (24h Range):  Temp:  [98.5 °F (36.9 °C)-99.9 °F (37.7 °C)] 99.9 °F (37.7 °C)  Pulse:  [] 112  Resp:  [14-22] 16  SpO2:  [97 %-100 %] 98 %  BP: (103-117)/(54-65) 105/55     Weight: 79.4 kg (175 lb) (12/02/17 2355)  Body mass index is 28.25 kg/m².  Body surface area is 1.92 meters squared.    I/O last 3 completed shifts:  In: 600 [IV Piggyback:600]  Out: -     Physical Exam   Constitutional: He is oriented to person, place, and time. No distress.   Cardiovascular: Regular rhythm.  Tachycardia present.    Pulmonary/Chest: Effort normal. He has decreased breath sounds in the right lower field and the left lower field. He has no wheezes.   Abdominal: Soft. He exhibits distension. There is tenderness. There is no rebound and no guarding.   Wound vac to incision   Musculoskeletal: He exhibits edema.   Neurological: He is alert and oriented to person, place, and time.   Skin: He is not diaphoretic.   Psychiatric: He has a normal mood and affect. His behavior is normal. Thought content normal.   Nursing note and vitals reviewed.      Significant Labs:  ABGs: No results for input(s): PH, PCO2, HCO3, POCSATURATED, BE in the last 168 hours.  BMP:   Recent Labs  Lab 12/03/17  0410 12/03/17  1105   * 98   CL 95 93*   CO2 19* 20*   BUN 44* 45*   CREATININE 4.3* 4.4*   CALCIUM 7.7* 8.3*   MG 1.8  --      CBC:   Recent Labs  Lab 12/03/17  1105   WBC 10.91   RBC 3.56*   HGB 10.8*   HCT 31.6*      MCV 89   MCH 30.3   MCHC 34.2     CMP:   Recent Labs  Lab 12/03/17  1105   GLU 98   CALCIUM 8.3*   ALBUMIN 2.0*   PROT 4.8*   *   K 3.4*   CO2 20*   CL 93*   BUN 45*   CREATININE 4.4*   ALKPHOS 148*   ALT <5*   AST 16   BILITOT 3.6*     All labs within the past 24 hours have been  reviewed.      Assessment/Plan:     LAURA (acute kidney injury)    Jhonny Diana a 27 year old with acute alcoholic hepatitis who was transplant on 10/19/17 course complicated by seizures, ATN, wound infection and peritonitis. He was admitted for peritonitis from 11/24-11/30 for peritonitis. Upon discharge patient stated that he felt well but last night once again started to have fevers. In the setting of recent transplant, continued fevers, and elevated bili     Plan:   - LAURA on CKD (baseline sCr 3.9 - 4.0). Likely instrinsic etiology, multifactorial secondary to sepsis vs. Toxins  - Continue with sodium biacarbonate 1,300 mg q TID for acidosis  - Strict inatake and output, per mother made around 1.2 L on their documented measurement.  - Will have paracentesis and ERCP for tomorrow  - Avoid nephrotoxic medication, renal dose medications and avoid contrast   - Renal diet  - No need for RRT            Win Sandoval  Nephrology  Fellow  [facility]    Pager [phone]    ATTENDING PHYSICIAN ATTESTATION  I have personally interviewed and examined the patient. I thoroughly reviewed the demographic, clinical, laboratorial and imaging information available in medical records. I agree with the assessment and recommendations provided by the subspecialty resident. Dr. Gentile was under my supervision.

## 2017-12-04 ENCOUNTER — ANESTHESIA (OUTPATIENT)
Dept: ENDOSCOPY | Facility: HOSPITAL | Age: 27
DRG: 949 | End: 2017-12-04
Payer: COMMERCIAL

## 2017-12-04 ENCOUNTER — ANESTHESIA EVENT (OUTPATIENT)
Dept: ENDOSCOPY | Facility: HOSPITAL | Age: 27
DRG: 949 | End: 2017-12-04
Payer: COMMERCIAL

## 2017-12-04 ENCOUNTER — SURGERY (OUTPATIENT)
Age: 27
End: 2017-12-04

## 2017-12-04 LAB
ALBUMIN SERPL BCP-MCNC: 1.6 G/DL
ALP SERPL-CCNC: 126 U/L
ALT SERPL W/O P-5'-P-CCNC: 5 U/L
ANION GAP SERPL CALC-SCNC: 14 MMOL/L
ANISOCYTOSIS BLD QL SMEAR: SLIGHT
AST SERPL-CCNC: 14 U/L
BACTERIA SPEC AEROBE CULT: NO GROWTH
BACTERIA SPEC ANAEROBE CULT: NORMAL
BACTERIA SPEC ANAEROBE CULT: NORMAL
BACTERIA UR CULT: NO GROWTH
BASOPHILS # BLD AUTO: ABNORMAL K/UL
BASOPHILS NFR BLD: 4 %
BILIRUB SERPL-MCNC: 3.8 MG/DL
BLD PROD TYP BPU: NORMAL
BLOOD UNIT EXPIRATION DATE: NORMAL
BLOOD UNIT TYPE CODE: 6200
BLOOD UNIT TYPE: NORMAL
BUN SERPL-MCNC: 47 MG/DL
CALCIUM SERPL-MCNC: 7.4 MG/DL
CHLORIDE SERPL-SCNC: 95 MMOL/L
CO2 SERPL-SCNC: 19 MMOL/L
CODING SYSTEM: NORMAL
CREAT SERPL-MCNC: 4.3 MG/DL
CYCLOSPORINE BLD LC/MS/MS-MCNC: 246 NG/ML
DIFFERENTIAL METHOD: ABNORMAL
DISPENSE STATUS: NORMAL
EOSINOPHIL # BLD AUTO: ABNORMAL K/UL
EOSINOPHIL NFR BLD: 1 %
ERYTHROCYTE [DISTWIDTH] IN BLOOD BY AUTOMATED COUNT: 17.6 %
EST. GFR  (AFRICAN AMERICAN): 20.2 ML/MIN/1.73 M^2
EST. GFR  (NON AFRICAN AMERICAN): 17.5 ML/MIN/1.73 M^2
GLUCOSE SERPL-MCNC: 87 MG/DL
HCT VFR BLD AUTO: 25.2 %
HGB BLD-MCNC: 8.7 G/DL
HYPOCHROMIA BLD QL SMEAR: ABNORMAL
IMM GRANULOCYTES # BLD AUTO: ABNORMAL K/UL
IMM GRANULOCYTES NFR BLD AUTO: ABNORMAL %
INR PPP: 1.7
LYMPHOCYTES # BLD AUTO: ABNORMAL K/UL
LYMPHOCYTES NFR BLD: 15 %
MAGNESIUM SERPL-MCNC: 1.9 MG/DL
MCH RBC QN AUTO: 30.3 PG
MCHC RBC AUTO-ENTMCNC: 34.5 G/DL
MCV RBC AUTO: 88 FL
MONOCYTES # BLD AUTO: ABNORMAL K/UL
MONOCYTES NFR BLD: 7 %
NEUTROPHILS NFR BLD: 73 %
NRBC BLD-RTO: 0 /100 WBC
NUM UNITS TRANS FFP: NORMAL
OVALOCYTES BLD QL SMEAR: ABNORMAL
PHOSPHATE SERPL-MCNC: 3.4 MG/DL
PLATELET # BLD AUTO: 269 K/UL
PLATELET BLD QL SMEAR: ABNORMAL
PMV BLD AUTO: 10.2 FL
POIKILOCYTOSIS BLD QL SMEAR: SLIGHT
POLYCHROMASIA BLD QL SMEAR: ABNORMAL
POTASSIUM SERPL-SCNC: 3.2 MMOL/L
PROT SERPL-MCNC: 4 G/DL
PROTHROMBIN TIME: 17.1 SEC
RBC # BLD AUTO: 2.87 M/UL
SODIUM SERPL-SCNC: 128 MMOL/L
WBC # BLD AUTO: 8.95 K/UL

## 2017-12-04 PROCEDURE — P9017 PLASMA 1 DONOR FRZ W/IN 8 HR: HCPCS

## 2017-12-04 PROCEDURE — 25000003 PHARM REV CODE 250: Performed by: PHYSICIAN ASSISTANT

## 2017-12-04 PROCEDURE — 85007 BL SMEAR W/DIFF WBC COUNT: CPT

## 2017-12-04 PROCEDURE — 80158 DRUG ASSAY CYCLOSPORINE: CPT

## 2017-12-04 PROCEDURE — 25000003 PHARM REV CODE 250: Performed by: NURSE PRACTITIONER

## 2017-12-04 PROCEDURE — 63600175 PHARM REV CODE 636 W HCPCS: Performed by: NURSE PRACTITIONER

## 2017-12-04 PROCEDURE — 37000008 HC ANESTHESIA 1ST 15 MINUTES: Performed by: INTERNAL MEDICINE

## 2017-12-04 PROCEDURE — 63600175 PHARM REV CODE 636 W HCPCS: Performed by: NURSE ANESTHETIST, CERTIFIED REGISTERED

## 2017-12-04 PROCEDURE — 84100 ASSAY OF PHOSPHORUS: CPT

## 2017-12-04 PROCEDURE — D9220A PRA ANESTHESIA: Mod: ,,, | Performed by: ANESTHESIOLOGY

## 2017-12-04 PROCEDURE — 80053 COMPREHEN METABOLIC PANEL: CPT

## 2017-12-04 PROCEDURE — 27201674 HC SPHINCTERTOME: Performed by: INTERNAL MEDICINE

## 2017-12-04 PROCEDURE — C1769 GUIDE WIRE: HCPCS | Performed by: INTERNAL MEDICINE

## 2017-12-04 PROCEDURE — 74328 X-RAY BILE DUCT ENDOSCOPY: CPT | Performed by: INTERNAL MEDICINE

## 2017-12-04 PROCEDURE — 99232 SBSQ HOSP IP/OBS MODERATE 35: CPT | Mod: ,,, | Performed by: INTERNAL MEDICINE

## 2017-12-04 PROCEDURE — 43235 EGD DIAGNOSTIC BRUSH WASH: CPT | Mod: 22,,, | Performed by: INTERNAL MEDICINE

## 2017-12-04 PROCEDURE — 99233 SBSQ HOSP IP/OBS HIGH 50: CPT | Mod: 24,,, | Performed by: NURSE PRACTITIONER

## 2017-12-04 PROCEDURE — P9047 ALBUMIN (HUMAN), 25%, 50ML: HCPCS | Performed by: NURSE PRACTITIONER

## 2017-12-04 PROCEDURE — 63600175 PHARM REV CODE 636 W HCPCS: Performed by: PHYSICIAN ASSISTANT

## 2017-12-04 PROCEDURE — 85027 COMPLETE CBC AUTOMATED: CPT

## 2017-12-04 PROCEDURE — 37000009 HC ANESTHESIA EA ADD 15 MINS: Performed by: INTERNAL MEDICINE

## 2017-12-04 PROCEDURE — 20600001 HC STEP DOWN PRIVATE ROOM

## 2017-12-04 PROCEDURE — 85610 PROTHROMBIN TIME: CPT

## 2017-12-04 PROCEDURE — 25000003 PHARM REV CODE 250: Performed by: NURSE ANESTHETIST, CERTIFIED REGISTERED

## 2017-12-04 PROCEDURE — 83735 ASSAY OF MAGNESIUM: CPT

## 2017-12-04 PROCEDURE — 43235 EGD DIAGNOSTIC BRUSH WASH: CPT | Performed by: INTERNAL MEDICINE

## 2017-12-04 PROCEDURE — 27202304 HC CANNULA, ERCP: Performed by: INTERNAL MEDICINE

## 2017-12-04 PROCEDURE — 36415 COLL VENOUS BLD VENIPUNCTURE: CPT

## 2017-12-04 PROCEDURE — 0DJ08ZZ INSPECTION OF UPPER INTESTINAL TRACT, VIA NATURAL OR ARTIFICIAL OPENING ENDOSCOPIC: ICD-10-PCS | Performed by: INTERNAL MEDICINE

## 2017-12-04 RX ORDER — BISACODYL 10 MG
10 SUPPOSITORY, RECTAL RECTAL DAILY PRN
Status: DISCONTINUED | OUTPATIENT
Start: 2017-12-04 | End: 2017-12-20 | Stop reason: HOSPADM

## 2017-12-04 RX ORDER — POTASSIUM CHLORIDE 20 MEQ/1
20 TABLET, EXTENDED RELEASE ORAL EVERY 4 HOURS
Status: DISPENSED | OUTPATIENT
Start: 2017-12-04 | End: 2017-12-04

## 2017-12-04 RX ORDER — HYDROCODONE BITARTRATE AND ACETAMINOPHEN 500; 5 MG/1; MG/1
TABLET ORAL
Status: DISCONTINUED | OUTPATIENT
Start: 2017-12-04 | End: 2017-12-06

## 2017-12-04 RX ORDER — HEPARIN SODIUM 5000 [USP'U]/ML
5000 INJECTION, SOLUTION INTRAVENOUS; SUBCUTANEOUS EVERY 8 HOURS
Status: DISCONTINUED | OUTPATIENT
Start: 2017-12-05 | End: 2017-12-11

## 2017-12-04 RX ORDER — POLYETHYLENE GLYCOL 3350 17 G/17G
17 POWDER, FOR SOLUTION ORAL DAILY
Status: DISCONTINUED | OUTPATIENT
Start: 2017-12-04 | End: 2017-12-14

## 2017-12-04 RX ORDER — SODIUM CHLORIDE 0.9 % (FLUSH) 0.9 %
3 SYRINGE (ML) INJECTION
Status: DISCONTINUED | OUTPATIENT
Start: 2017-12-04 | End: 2017-12-04

## 2017-12-04 RX ORDER — LIDOCAINE HCL/PF 100 MG/5ML
SYRINGE (ML) INTRAVENOUS
Status: DISCONTINUED | OUTPATIENT
Start: 2017-12-04 | End: 2017-12-04

## 2017-12-04 RX ORDER — ONDANSETRON 2 MG/ML
INJECTION INTRAMUSCULAR; INTRAVENOUS
Status: DISCONTINUED | OUTPATIENT
Start: 2017-12-04 | End: 2017-12-04

## 2017-12-04 RX ORDER — ALBUMIN HUMAN 250 G/1000ML
25 SOLUTION INTRAVENOUS EVERY 6 HOURS
Status: COMPLETED | OUTPATIENT
Start: 2017-12-04 | End: 2017-12-04

## 2017-12-04 RX ORDER — PROPOFOL 10 MG/ML
VIAL (ML) INTRAVENOUS
Status: DISCONTINUED | OUTPATIENT
Start: 2017-12-04 | End: 2017-12-04

## 2017-12-04 RX ORDER — FENTANYL CITRATE 50 UG/ML
INJECTION, SOLUTION INTRAMUSCULAR; INTRAVENOUS
Status: DISCONTINUED | OUTPATIENT
Start: 2017-12-04 | End: 2017-12-04

## 2017-12-04 RX ORDER — LORAZEPAM 2 MG/ML
1 INJECTION INTRAMUSCULAR ONCE
Status: DISCONTINUED | OUTPATIENT
Start: 2017-12-04 | End: 2017-12-06

## 2017-12-04 RX ORDER — ONDANSETRON 2 MG/ML
4 INJECTION INTRAMUSCULAR; INTRAVENOUS DAILY PRN
Status: DISCONTINUED | OUTPATIENT
Start: 2017-12-04 | End: 2017-12-04

## 2017-12-04 RX ORDER — HYDROMORPHONE HYDROCHLORIDE 1 MG/ML
0.2 INJECTION, SOLUTION INTRAMUSCULAR; INTRAVENOUS; SUBCUTANEOUS EVERY 5 MIN PRN
Status: DISCONTINUED | OUTPATIENT
Start: 2017-12-04 | End: 2017-12-04

## 2017-12-04 RX ORDER — SUCCINYLCHOLINE CHLORIDE 20 MG/ML
INJECTION INTRAMUSCULAR; INTRAVENOUS
Status: DISCONTINUED | OUTPATIENT
Start: 2017-12-04 | End: 2017-12-04

## 2017-12-04 RX ORDER — SODIUM CHLORIDE 9 MG/ML
INJECTION, SOLUTION INTRAVENOUS CONTINUOUS PRN
Status: DISCONTINUED | OUTPATIENT
Start: 2017-12-04 | End: 2017-12-04

## 2017-12-04 RX ORDER — DOCUSATE SODIUM 100 MG/1
100 CAPSULE, LIQUID FILLED ORAL 3 TIMES DAILY
Status: DISCONTINUED | OUTPATIENT
Start: 2017-12-04 | End: 2017-12-09

## 2017-12-04 RX ORDER — ROCURONIUM BROMIDE 10 MG/ML
INJECTION, SOLUTION INTRAVENOUS
Status: DISCONTINUED | OUTPATIENT
Start: 2017-12-04 | End: 2017-12-04

## 2017-12-04 RX ADMIN — PANTOPRAZOLE SODIUM 40 MG: 40 TABLET, DELAYED RELEASE ORAL at 08:12

## 2017-12-04 RX ADMIN — ALBUMIN (HUMAN): 12.5 SOLUTION INTRAVENOUS at 12:12

## 2017-12-04 RX ADMIN — FENTANYL CITRATE 25 MCG: 50 INJECTION, SOLUTION INTRAMUSCULAR; INTRAVENOUS at 01:12

## 2017-12-04 RX ADMIN — THERA TABS 1 TABLET: TAB at 08:12

## 2017-12-04 RX ADMIN — CEFEPIME HYDROCHLORIDE 1 G: 1 INJECTION, POWDER, FOR SOLUTION INTRAMUSCULAR; INTRAVENOUS at 04:12

## 2017-12-04 RX ADMIN — ALBUMIN (HUMAN) 25 G: 12.5 SOLUTION INTRAVENOUS at 09:12

## 2017-12-04 RX ADMIN — HEPARIN SODIUM 5000 UNITS: 5000 INJECTION, SOLUTION INTRAVENOUS; SUBCUTANEOUS at 05:12

## 2017-12-04 RX ADMIN — CYCLOSPORINE 100 MG: 100 CAPSULE, LIQUID FILLED ORAL at 08:12

## 2017-12-04 RX ADMIN — SODIUM BICARBONATE 650 MG TABLET 1300 MG: at 09:12

## 2017-12-04 RX ADMIN — DOCUSATE SODIUM 100 MG: 100 CAPSULE, LIQUID FILLED ORAL at 09:12

## 2017-12-04 RX ADMIN — ONDANSETRON 8 MG: 8 TABLET, ORALLY DISINTEGRATING ORAL at 10:12

## 2017-12-04 RX ADMIN — LEVETIRACETAM 500 MG: 500 TABLET, FILM COATED ORAL at 09:12

## 2017-12-04 RX ADMIN — SUCCINYLCHOLINE CHLORIDE 120 MG: 20 INJECTION, SOLUTION INTRAMUSCULAR; INTRAVENOUS at 12:12

## 2017-12-04 RX ADMIN — POLYETHYLENE GLYCOL 3350 17 G: 17 POWDER, FOR SOLUTION ORAL at 04:12

## 2017-12-04 RX ADMIN — CYCLOSPORINE 100 MG: 100 CAPSULE, LIQUID FILLED ORAL at 05:12

## 2017-12-04 RX ADMIN — LEVETIRACETAM 500 MG: 500 TABLET, FILM COATED ORAL at 08:12

## 2017-12-04 RX ADMIN — POTASSIUM CHLORIDE 20 MEQ: 1500 TABLET, EXTENDED RELEASE ORAL at 04:12

## 2017-12-04 RX ADMIN — DOCUSATE SODIUM 100 MG: 100 CAPSULE, LIQUID FILLED ORAL at 04:12

## 2017-12-04 RX ADMIN — FENTANYL CITRATE 100 MCG: 50 INJECTION, SOLUTION INTRAMUSCULAR; INTRAVENOUS at 12:12

## 2017-12-04 RX ADMIN — PROPOFOL 150 MG: 10 INJECTION, EMULSION INTRAVENOUS at 12:12

## 2017-12-04 RX ADMIN — Medication 100 MG: at 08:12

## 2017-12-04 RX ADMIN — OXYCODONE HYDROCHLORIDE 10 MG: 5 TABLET ORAL at 05:12

## 2017-12-04 RX ADMIN — SODIUM CHLORIDE: 0.9 INJECTION, SOLUTION INTRAVENOUS at 12:12

## 2017-12-04 RX ADMIN — OXYCODONE HYDROCHLORIDE 10 MG: 5 TABLET ORAL at 07:12

## 2017-12-04 RX ADMIN — VANCOMYCIN HYDROCHLORIDE 1000 MG: 1 INJECTION, POWDER, LYOPHILIZED, FOR SOLUTION INTRAVENOUS at 04:12

## 2017-12-04 RX ADMIN — OXYCODONE HYDROCHLORIDE 10 MG: 5 TABLET ORAL at 11:12

## 2017-12-04 RX ADMIN — ROCURONIUM BROMIDE 5 MG: 10 INJECTION, SOLUTION INTRAVENOUS at 12:12

## 2017-12-04 RX ADMIN — VALGANCICLOVIR 450 MG: 450 TABLET, FILM COATED ORAL at 05:12

## 2017-12-04 RX ADMIN — ONDANSETRON 4 MG: 2 INJECTION INTRAMUSCULAR; INTRAVENOUS at 12:12

## 2017-12-04 RX ADMIN — ATOVAQUONE 1500 MG: 750 SUSPENSION ORAL at 08:12

## 2017-12-04 RX ADMIN — LIDOCAINE HYDROCHLORIDE 60 MG: 20 INJECTION, SOLUTION INTRAVENOUS at 12:12

## 2017-12-04 NOTE — PROGRESS NOTES
Notified Dr Mendez EKG completed. Will wait for md to view EKG, in the meantime pt hr st 125, no acute distress noted. Will continue to monitor pt.

## 2017-12-04 NOTE — PLAN OF CARE
Recovery complete. Phoned IR to inquire on patients paracentesis. IR personnel notes that the pt.  is on schedule for 12/5/17 at 10 vs 1030 am.

## 2017-12-04 NOTE — ASSESSMENT & PLAN NOTE
- elevated t bili post liver txp in setting of abdominal pain, decreased appetitive and intermittent nausea.  PBS consulted to assess duct per ERCP.  - ERCP performed today but PBS unable to cannulate. Will plan for MRCP today to assess for stricture and if so PBS to plan for repeat ERCP in 48 hrs.   - NPO past MN.

## 2017-12-04 NOTE — PROGRESS NOTES
Notified Dr Mendez of pt hr st 120s-130, bp 108/58, temp 99.7. Made md aware that pt reported po intake for the day has been 3oz and pain 8/10 to abd with 1 liter ns ordered infusing at 50cc/hr. Md stated to order EKG. Will continue to monitor pt. In the meantime po pain med given, pt mother at side.

## 2017-12-04 NOTE — PLAN OF CARE
Awaitng transport back to room. Tele Box replaced. Tele Unit called . Will continue to monitor pt until transport arrives.

## 2017-12-04 NOTE — PROGRESS NOTES
Admit Note     Met with mother to assess patients needs due to pt being off floor for a procedure.  Patient is a 27 y.o. single male, admitted post liver transplantation on 10/19/17; and admitted for fever. This is pt's third readmission for fever per mother.    Patient admitted from local lodging at [id number] on 12/3/2017 .  At this time, patient presents as alert and oriented x 4.  At this time, patients caregiver presents as alert and oriented x 4, pleasant, good eye contact, well groomed, recall good, concentration/judgement good, average intelligence, calm, communicative, cooperative and asking and answering questions appropriately.      Household/Family Systems (as reported by patients caregiver)     Patient resides with patient's mother, at [address]  [place] [address].  Support system includes pt's friend and former roommate Kaila Schwarz, pt's mother Sanjuanita, sister Miranda, and aunt Sophy.  Patient does not have dependents that are need of being cared for.     Patients primary caregiver is Sanjuanita Diana, patients mother, phone number [phone], [facility] apartment phone number [phone].  Confirmed patients contact information is [phone] (home);     Telephone Information:   Mobile [phone]   .    During admission, patient's caregiver plans to stay in patient's room.  Confirmed patient and patients caregivers do have access to reliable transportation.    Cognitive Status/Learning     Patients caregiver reports patients reading ability as college and states patient does not have difficulty with N/A.  Patients caregiver reports patient learns best by written, verbal, and demonstration.   Needed: No.   Highest education level:some college.    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 Pt is currently  "getting no benefit from his employer. Pt is working on getting FMLA. If approved, pt will receive FMLA for about 12 weeks. Pt will then transition to STD that will allow him 60% of his salary for 26 weeks. After this STD will transition to LTD. Mother and pt are attmepting to get this taken care of and mother states she has been having issues with communication between [facility] and [facility]. Mother states as she is aware, pt's company is still paying his insurance premiums for [facility]. .  Mother reports dates will be back-dated once approved.     Adherence     Patients caregiver reports patient has a high level of adherence to patients health care regimen.  Adherence counseling and education provided.  Patient's caregiver verbalizes understanding.    Substance Use    Patients caregiver reports patients substance usage as the following:    Tobacco: none, patient denies any use.  Alcohol: none, patient denies any use. Last use was August 2017, when he became acutely sick with ETOH cirrhosis. Prior to quitting, pt would drink 1/5th of whiskey daily from 7618-0108. Pt attempted to stop drinking in 2016 by tapering down to 4-5 "stiff" drinks of whiskey per say and in the beginning of 2017 was down to a glass of wine per day. Pt attributes ETOH use to job stressors. Pt reports that he will enroll in ABU upon being medically able. Pt has has multiple readmissions to hospital and has not yet enrolled. SW provided contact ([phone] x 2). Pt's mother reports she is looking forward to him being able to attend.   Illicit Drugs/Non-prescribed Medications: none, patient denies any use. Pt reports brief addiction to Lortabs in 2011 right after college.   Patients caregiver states clear understanding of the potential impact of substance use.  Substance abstinence/cessation counseling, education and resources provided and reviewed.     Services Utilizing/ADLS (as reported by patients caregiver)    Infusion Service: " Prior to admission, patient utilizing? no  Home Health: Prior to admission, patient utilizing? yes, [facility] for SN & PT  DME: Prior to admission, yes [employer] wound vac, rolling walker, and w/c  Pulmonary/Cardiac Rehab: Prior to admission, no  Dialysis:  Prior to admission, yes pt has a chair at [facility] MWF at 3 pm but has not needed dialysis since 17; pt still has perm cath.  Transplant Specialty Pharmacy:  Prior to admission, yes; [facility].    Prior to admission, patients caregiver reports patient was not independent with ADLS and was not driving.  Patients caregiver reports patient is not able to care for self at this time due to compromised medical condition (as documented in medical record) and physical weakness..  Patients caregiver reports patient indicates a willingness to care for self once medically cleared to do so.    Insurance/Medications    Insured by   Payor/Plan Subscr  Sex Relation Sub. Ins. ID Effective Group Num   1. [facility]* RAFFY THOMAS 1990 Male  [id number]                                   [address] [address]   2. [facility] RAFFY THOMAS 1990 Male  [id number]                                   [address]      Primary Insurance (for UNOS reporting): Private Insurance  Secondary Insurance (for UNOS reporting): None    Patients caregiver reports patient is able to obtain and afford medications at this time and at time of discharge.    Living Will/Healthcare Power of     Patients caregiver reports patient has a LW and/or HCPA.   provided education regarding LW and HCPA and the completion of forms.    Coping/Mental Health (as reported by patients caregiver)    Patient is coping well with the aid of  family members and friends. Patient is open to attending ABU to address past ETOH addiction and social anxiety. Patients caregiver is coping well with the aid of  family members and friends. Pt's mother explained her  "frustrations as she feels helpless not being able to do anything for the patient. Mother reports it is like a "depressing" feeling. Mother states pt has been moving backward since his first readmission with fever and has become weaker, does not have an appetite, and struggles to get pt to take his medications. Mother hoping the medical team is able to find an issue and solution this admission.     Discharge Planning (as reported by patients caregiver)    At time of discharge, patient plans to return to [facility] apartments under the care of Sanjuanita Diana.  Patients mother will transport patient.  Per rounds today, expected discharge date has not been medically determined at this time. Patients caretaker verbalizes understanding and is involved in treatment planning and discharge process.    Additional Concerns    Patient's caretaker denies additional needs and/or concerns at this time. Patient is being followed for needs, education, resources, information, emotional support, supportive counseling, and for supportive and skilled discharge plan of care.  providing ongoing psychosocial support, education, resources and d/c planning as needed.  SW remains available.  provided resource list, patient choice, psychosocial and supportive counseling, resources, education, assistance and discharge planning with patient and caregiver involvement, ongoing SW availability and services as appropriate.  remains available. Patient's caregiver verbalizes understanding and agreement with information reviewed,  availability and how to access available resources as needed. Patient denies additional needs and/or concerns at this time. Patient verbalizes understanding and agreement with information reviewed, social work availability, and how to access available resources as needed.  "

## 2017-12-04 NOTE — SUBJECTIVE & OBJECTIVE
Scheduled Meds:   albumin human 25%  25 g Intravenous Q6H    atovaquone  1,500 mg Oral Daily    ceFEPime (MAXIPIME) IVPB  1 g Intravenous Q12H    cycloSPORINE modified (NEORAL)  100 mg Oral BID    docusate sodium  100 mg Oral TID    [START ON 12/5/2017] ergocalciferol  50,000 Units Oral Q7 Days    [START ON 12/5/2017] heparin (porcine)  5,000 Units Subcutaneous Q8H    levETIRAcetam  500 mg Oral BID    multivitamin  1 tablet Oral Daily    pantoprazole  40 mg Oral Daily    polyethylene glycol  17 g Oral Daily    potassium chloride  20 mEq Oral Q4H    sodium bicarbonate  1,300 mg Oral BID    thiamine  100 mg Oral Daily    valGANciclovir  450 mg Oral Every Mon, Wed, Fri    vancomycin (VANCOCIN) IVPB  1,000 mg Intravenous Q24H     Continuous Infusions:   sodium chloride 0.9% 50 mL/hr at 12/03/17 1113     PRN Meds:sodium chloride, acetaminophen, bisacodyl, ondansetron, oxyCODONE, oxyCODONE, ramelteon, sodium chloride 0.9%    Review of Systems   Constitutional: Positive for activity change, fatigue and fever. Negative for chills.   Respiratory: Negative for cough, shortness of breath and wheezing.    Cardiovascular: Positive for leg swelling.   Gastrointestinal: Positive for abdominal distention, abdominal pain and constipation. Negative for nausea and vomiting.   Genitourinary: Negative for difficulty urinating and dysuria.   Skin: Positive for wound.   Allergic/Immunologic: Positive for immunocompromised state.   Neurological: Positive for weakness.   Psychiatric/Behavioral: Negative for confusion and decreased concentration. The patient is nervous/anxious.      Objective:     Vital Signs (Most Recent):  Temp: 98.1 °F (36.7 °C) (12/04/17 1444)  Pulse: 100 (12/04/17 1530)  Resp: 18 (12/04/17 1530)  BP: 111/67 (12/04/17 1530)  SpO2: 100 % (12/04/17 1530) Vital Signs (24h Range):  Temp:  [98.1 °F (36.7 °C)-99.9 °F (37.7 °C)] 98.1 °F (36.7 °C)  Pulse:  [100-128] 100  Resp:  [16-20] 18  SpO2:  [97 %-100 %]  100 %  BP: (102-121)/(54-67) 111/67     Weight: 81.1 kg (178 lb 14.4 oz)  Body mass index is 28.88 kg/m².    Intake/Output - Last 3 Shifts       12/02 0700 - 12/03 0659 12/03 0700 - 12/04 0659 12/04 0700 - 12/05 0659    P.O.  90 0    I.V. (mL/kg)   600 (7.4)    IV Piggyback 600 350     Total Intake(mL/kg) 600 (7.6) 440 (5.4) 600 (7.4)    Urine (mL/kg/hr)  730 (0.4) 225 (0.3)    Emesis/NG output   0 (0)    Other   0 (0)    Stool   0 (0)    Blood   0 (0)    Total Output   730 225    Net +600 -290 +375           Urine Occurrence   0 x    Stool Occurrence   0 x    Emesis Occurrence   0 x          Physical Exam   Constitutional: He is oriented to person, place, and time. He appears well-developed and well-nourished. No distress.   HENT:   Head: Normocephalic and atraumatic.   Neck: Normal range of motion. Neck supple. No JVD present.   Cardiovascular: Regular rhythm and normal heart sounds.  Tachycardia present.    No murmur heard.  Pulmonary/Chest: Effort normal. No respiratory distress. He has decreased breath sounds in the right middle field, the right lower field and the left lower field. He has no wheezes. He exhibits no tenderness.   Abdominal: Soft. Bowel sounds are normal. He exhibits distension. There is tenderness (diffusely ). There is no rebound and no guarding.   Wound vac to incision   Musculoskeletal: Normal range of motion. He exhibits edema (2+ LE edema). He exhibits no tenderness.   Neurological: He is alert and oriented to person, place, and time. He has normal reflexes.   Skin: Skin is warm and dry. He is not diaphoretic.   Psychiatric: He has a normal mood and affect. His behavior is normal. Judgment and thought content normal.   Nursing note and vitals reviewed.      Laboratory:  Immunosuppressants         Stop Route Frequency     cycloSPORINE modified (NEORAL) capsule 100 mg      -- Oral 2 times daily        CBC:   Recent Labs  Lab 12/04/17  0536   WBC 8.95   RBC 2.87*   HGB 8.7*   HCT 25.2*   PLT  269   MCV 88   MCH 30.3   MCHC 34.5     CMP:   Recent Labs  Lab 12/04/17  0536   GLU 87   CALCIUM 7.4*   ALBUMIN 1.6*   PROT 4.0*   *   K 3.2*   CO2 19*   CL 95   BUN 47*   CREATININE 4.3*   ALKPHOS 126   ALT 5*   AST 14   BILITOT 3.8*     Coagulation:   Recent Labs  Lab 12/04/17  0536   INR 1.7*     Labs within the past 24 hours have been reviewed.    Diagnostic Results:  I have personally reviewed all pertinent imaging studies.

## 2017-12-04 NOTE — ANESTHESIA PREPROCEDURE EVALUATION
12/04/2017  Jhonny Diana is a 27 y.o., male.    Anesthesia Evaluation    I have reviewed the Patient Summary Reports.    I have reviewed the Nursing Notes.   I have reviewed the Medications.     Review of Systems  Anesthesia Hx:  No problems with previous Anesthesia  History of prior surgery of interest to airway management or planning: liver transplant. Previous anesthesia: General Denies Family Hx of Anesthesia complications.   Denies Personal Hx of Anesthesia complications.   Cardiovascular:   Exercise tolerance: poor Hypertension    Renal/:   Chronic Renal Disease, ARF    Hepatic/GI:   Liver Disease, Hepatitis    Neurological:   Seizures, well controlled    Psych:   Psychiatric History          Physical Exam  General:  Well nourished    Airway/Jaw/Neck:  Airway Findings: Mouth Opening: Normal Tongue: Normal  General Airway Assessment: Adult  Mallampati: II  Improves to II with phonation.  TM Distance: Normal, at least 6 cm  Jaw/Neck Findings:  Neck ROM: Normal ROM      Dental:  Dental Findings: In tact   Chest/Lungs:  Chest/Lungs Findings: Clear to auscultation, Normal Respiratory Rate     Heart/Vascular:  Heart Findings: Rate: Normal  Rhythm: Regular Rhythm  Sounds: Normal        Mental Status:  Mental Status Findings:  Cooperative, Alert and Oriented         Anesthesia Plan  Type of Anesthesia, risks & benefits discussed:  Anesthesia Type:  general  Patient's Preference:   Intra-op Monitoring Plan: standard ASA monitors  Intra-op Monitoring Plan Comments:   Post Op Pain Control Plan: multimodal analgesia  Post Op Pain Control Plan Comments:   Induction:   IV  Beta Blocker:  Patient is not currently on a Beta-Blocker (No further documentation required).       Informed Consent:  Anesthesia consent signed with patient.  ASA Score: 3     Day of Surgery Review of History & Physical:    H&P update referred  to the provider.         Ready For Surgery From Anesthesia Perspective.

## 2017-12-04 NOTE — PATIENT INSTRUCTIONS
Discharge Summary/Instructions after an Endoscopic Procedure  Patient Name: Jhonny Diana  Patient MRN: [id number]  Patient YOB: 1990 Monday, December 04, 2017  Dionicio Hickman MD  RESTRICTIONS:  During your procedure today, you received medications for sedation.  These   medications may affect your judgment, balance and coordination.  Therefore,   for 24 hours, you have the following restrictions:   - DO NOT drive a car, operate machinery, make legal/financial decisions,   sign important papers or drink alcohol.    ACTIVITY:  The following day: return to full activity including work, except no heavy   lifting, straining or running for 3 days if polyps were removed.  DIET:  Eat and drink normally unless instructed otherwise.     TREATMENT FOR COMMON SIDE EFFECTS:  - Mild abdominal pain, belching, bloating or excessive gas: rest, eat   lightly and use a heating pad.  - Sore Throat: treat with throat lozenges and/or gargle with warm salt   water.  SYMPTOMS TO WATCH FOR AND REPORT TO YOUR PHYSICIAN:  1. Abdominal pain or bloating, other than gas cramps.  2. Chest pain.  3. Back pain.  4. Chills or fever occurring within 24 hours after the procedure.  5. Rectal bleeding, which would show as bright red, maroon, or black stools.   (A tablespoon of blood from the rectum is not serious, especially if   hemorrhoids are present.)  6. Vomiting.  7. Weakness or dizziness.  8. Because air was used during the procedure, expelling large amounts of air   from your rectum or belching is normal.  9. If a bowel prep was taken, you may not have a bowel movement for 1-3   days.  This is normal.  GO DIRECTLY TO THE NEAREST EMERGENCY ROOM IF YOU HAVE ANY OF THE FOLLOWING:      Difficulty breathing  Chills and/or fever over 101 F   Persistent vomiting and/or vomiting blood   Severe abdominal pain   Severe chest pain   Black, tarry stools   Bleeding- more than one tablespoon   Any other symptom or condition that you may feel needs  urgent attention  Your doctor recommends these additional instructions:  If any biopsies were taken, your doctor s clinic will contact you in 1 to 2   weeks with any results.  Watch for symptoms of pancreatitis, bleeding, perforation and cholangitis.   Your physician has recommended an MRCP.  For questions, problems or results please call your physician - Dionicio Hickman MD at Work:  [phone].  [facility], EMERGENCY ROOM PHONE NUMBER: [phone]  IF A COMPLICATION OR EMERGENCY SITUATION ARISES AND YOU ARE UNABLE TO REACH   YOUR PHYSICIAN - GO DIRECTLY TO THE EMERGENCY ROOM.  Dionicio Hickman MD  12/4/2017 3:07:12 PM  This report has been verified and signed electronically.

## 2017-12-04 NOTE — ANESTHESIA POSTPROCEDURE EVALUATION
"Anesthesia Post Evaluation    Patient: Jhonny Diana    Procedure(s) Performed: Procedure(s) (LRB):  ERCP (N/A)    Final Anesthesia Type: general  Patient location during evaluation: PACU  Patient participation: Yes- Able to Participate  Level of consciousness: awake and alert  Post-procedure vital signs: reviewed and stable  Pain management: adequate  Airway patency: patent  PONV status at discharge: No PONV  Anesthetic complications: no      Cardiovascular status: blood pressure returned to baseline  Respiratory status: unassisted  Hydration status: euvolemic  Follow-up not needed.        Visit Vitals  /68 (BP Location: Left arm, Patient Position: Lying)   Pulse 100   Temp 36.7 °C (98.1 °F) (Temporal)   Resp 18   Ht 5' 6" (1.676 m)   Wt 81.1 kg (178 lb 14.4 oz)   SpO2 100%   BMI 28.88 kg/m²       Pain/Diogenes Score: Pain Assessment Performed: Yes (12/4/2017  4:20 PM)  Presence of Pain: denies (12/4/2017  4:20 PM)  Pain Rating Prior to Med Admin: 7 (12/4/2017 11:24 AM)  Pain Rating Post Med Admin: 4 (12/4/2017  8:41 AM)  Diogenes Score: 9 (12/4/2017  3:15 PM)  Modified Diogenes Score: 15 (12/4/2017  2:45 PM)      "

## 2017-12-04 NOTE — PROGRESS NOTES
[facility]  Nephrology  Progress Note    Patient Name: Jhonny Diana  MRN: [id number]  Admission Date: 12/3/2017  Hospital Length of Stay: 1 days  Attending Provider: Solis Chapa MD   Primary Care Physician: Primary Doctor No  Principal Problem:Fever    Subjective:     HPI: Mr. Diana is a 26yo man w/a history of alcoholic cirrhosis s/p DDLT 10/19/2017; c/b seizures (swtiched off prograf to cyclo), ATN requiring HD (last HD 11/17), superificial wound infection s/p wound vac to chevron incision, and recent admit for fevers on 11/21 (discharged on empiric augmentin for suspected superficial wound infection) and readmitted 11/24 again with fever. Found to have peritonitis (WBC 5000, 75% PNM) neg for bile leak. He was treated initially with vanc/cefepime. Repeat cell counts 11/29 with some improvement (WBC 1400, 45% PNM). Pt transitioned to augmentin upon d/c 11/30.      Pt presented to the ED this morning with subjective fever (100.5) and general malaise beginning at 8pm yesterday. His abdomen is soft, but diffusely tender. Labs reveal elevated WBC count to 12.7 from 6.5 in addition to elevated tbili 3.5 from 2.3 when compared to labs 3 days ago, despite being on augmentin for recent diagnosis of peritonitis. He denies nausea, vomiting, diarrhea, congestion, cough, CP or SOB. He has received 1 L bolus NS in the ED.    Interval History: NAEON, Low grade temps since admission. sCr stable 4.3 mg/dl.  ml overnight. Hypokalemic 3.2 and CO2 19 this am.    Review of patient's allergies indicates:   Allergen Reactions    Bactrim [sulfamethoxazole-trimethoprim] Other (See Comments)     Mookie Trell Syndrome     Current Facility-Administered Medications   Medication Frequency    0.9%  NaCl infusion (for blood administration) Q24H PRN    0.9%  NaCl infusion Continuous    acetaminophen tablet 650 mg Q8H PRN    albumin human 25% bottle 25 g Q6H    atovaquone suspension 1,500 mg Daily    bisacodyl  suppository 10 mg Daily PRN    cefepime in dextrose 5 % 1 gram/50 mL IVPB 1 g Q12H    cycloSPORINE modified (NEORAL) capsule 100 mg BID    docusate sodium capsule 100 mg TID    [START ON 12/5/2017] ergocalciferol capsule 50,000 Units Q7 Days    [START ON 12/5/2017] heparin (porcine) injection 5,000 Units Q8H    HYDROmorphone injection 0.2 mg Q5 Min PRN    levETIRAcetam tablet 500 mg BID    multivitamin tablet 1 tablet Daily    ondansetron disintegrating tablet 8 mg Q8H PRN    ondansetron injection 4 mg Daily PRN    oxyCODONE immediate release tablet 10 mg Q6H PRN    oxyCODONE immediate release tablet 5 mg Q6H PRN    pantoprazole EC tablet 40 mg Daily    polyethylene glycol packet 17 g Daily    potassium chloride SA CR tablet 20 mEq Q4H    ramelteon tablet 8 mg Nightly PRN    sodium bicarbonate tablet 1,300 mg BID    sodium chloride 0.9% flush 3 mL PRN    sodium chloride 0.9% flush 3 mL PRN    thiamine tablet 100 mg Daily    valGANciclovir tablet 450 mg Every Mon, Wed, Fri    vancomycin 1 g in dextrose 5 % 250 mL IVPB (ready to mix system) Q24H     Facility-Administered Medications Ordered in Other Encounters   Medication Frequency    0.9%  NaCl infusion Continuous PRN    fentaNYL injection PRN    lidocaine (cardiac) injection PRN    ondansetron injection PRN    propofol (DIPRIVAN) 10 mg/mL infusion PRN    rocuronium injection PRN    succinylcholine injection PRN       Objective:     Vital Signs (Most Recent):  Temp: 99.3 °F (37.4 °C) (12/04/17 1123)  Pulse: 110 (12/04/17 1145)  Resp: 18 (12/04/17 1123)  BP: 104/65 (12/04/17 1123)  SpO2: 99 % (12/04/17 1123)  O2 Device (Oxygen Therapy): room air (12/04/17 1124) Vital Signs (24h Range):  Temp:  [99 °F (37.2 °C)-99.9 °F (37.7 °C)] 99.3 °F (37.4 °C)  Pulse:  [104-128] 110  Resp:  [16-20] 18  SpO2:  [97 %-100 %] 99 %  BP: (104-121)/(54-67) 104/65     Weight: 81.1 kg (178 lb 14.4 oz) (12/04/17 0515)  Body mass index is 28.88 kg/m².  Body  surface area is 1.94 meters squared.    I/O last 3 completed shifts:  In: 1040 [P.O.:90; IV Piggyback:950]  Out: 730 [Urine:730]    Physical Exam   Constitutional: He is oriented to person, place, and time. He appears ill. No distress.   HENT:   Head: Normocephalic and atraumatic.   Eyes: Pupils are equal, round, and reactive to light. Scleral icterus is present.   Neck: Normal range of motion. Neck supple.   Cardiovascular: Regular rhythm.  Tachycardia present.  Exam reveals no gallop and no friction rub.    No murmur heard.  Pulmonary/Chest: Effort normal. He has decreased breath sounds in the right lower field and the left lower field. He has no wheezes. He has no rales.   Abdominal: Soft. He exhibits distension. There is tenderness. There is no guarding.   Wound vac to incision. Positive ascitis    Musculoskeletal: He exhibits edema.   Neurological: He is alert and oriented to person, place, and time.   Skin: He is not diaphoretic.   Psychiatric: He has a normal mood and affect. His behavior is normal. Thought content normal.   Nursing note and vitals reviewed.      Significant Labs:  ABGs: No results for input(s): PH, PCO2, HCO3, POCSATURATED, BE in the last 168 hours.  BMP:   Recent Labs  Lab 12/04/17  0536   GLU 87   CL 95   CO2 19*   BUN 47*   CREATININE 4.3*   CALCIUM 7.4*   MG 1.9     CBC:   Recent Labs  Lab 12/04/17  0536   WBC 8.95   RBC 2.87*   HGB 8.7*   HCT 25.2*      MCV 88   MCH 30.3   MCHC 34.5     CMP:   Recent Labs  Lab 12/04/17  0536   GLU 87   CALCIUM 7.4*   ALBUMIN 1.6*   PROT 4.0*   *   K 3.2*   CO2 19*   CL 95   BUN 47*   CREATININE 4.3*   ALKPHOS 126   ALT 5*   AST 14   BILITOT 3.8*     All labs within the past 24 hours have been reviewed.     Significant Imaging:  Labs: Reviewed  X-Ray: Reviewed    Assessment/Plan:     LAURA (acute kidney injury)    Jhonny Diana a 27 year old with acute alcoholic hepatis s/p OHLTx 10/19/17 course complicated by seizures, ATN, wound infection and  peritonitis. He was admitted for peritonitis from 11/24-11/30 for peritonitis.     Plan:   - LAURA on CKD 4 (baseline sCr 3.9 - 4.0). Likely instrinsic etiology, multifactorial secondary to sepsis vs. Toxins  - Increase sodium biacarbonate 1,300 mg q TID for acidosis. Please.   - Need ABG. Ordered this am.   - Strict inatake and output,  overnight since admission.  - Will have paracentesis and ERCP plan for today  - Please give albumin with paracenthesis  - Avoid nephrotoxic medication, renal dose medications and avoid contrast   - Renal diet  - No need for RRT            Thank you for your consult. I will follow-up with patient. Please contact us if you have any additional questions.    Krish Frausto MD  Nephrology  [facility]    ATTENDING PHYSICIAN ATTESTATION  I have personally interviewed and examined the patient. I thoroughly reviewed the demographic, clinical, laboratorial and imaging information available in medical records. I agree with the assessment and recommendations provided by the subspecialty resident. Dr. Frausto was under my supervision.

## 2017-12-04 NOTE — ASSESSMENT & PLAN NOTE
- Cont with BS antibxs for treatment.   - Plan for ERCP today to assess biliary duct.   - wound vac changed on 12/2.

## 2017-12-04 NOTE — SUBJECTIVE & OBJECTIVE
Interval History: NAEON, Low grade temps since admission. sCr stable 4.3 mg/dl.  ml overnight. Hypokalemic 3.2 and CO2 19 this am.    Review of patient's allergies indicates:   Allergen Reactions    Bactrim [sulfamethoxazole-trimethoprim] Other (See Comments)     Mookie Trell Syndrome     Current Facility-Administered Medications   Medication Frequency    0.9%  NaCl infusion (for blood administration) Q24H PRN    0.9%  NaCl infusion Continuous    acetaminophen tablet 650 mg Q8H PRN    albumin human 25% bottle 25 g Q6H    atovaquone suspension 1,500 mg Daily    bisacodyl suppository 10 mg Daily PRN    cefepime in dextrose 5 % 1 gram/50 mL IVPB 1 g Q12H    cycloSPORINE modified (NEORAL) capsule 100 mg BID    docusate sodium capsule 100 mg TID    [START ON 12/5/2017] ergocalciferol capsule 50,000 Units Q7 Days    [START ON 12/5/2017] heparin (porcine) injection 5,000 Units Q8H    HYDROmorphone injection 0.2 mg Q5 Min PRN    levETIRAcetam tablet 500 mg BID    multivitamin tablet 1 tablet Daily    ondansetron disintegrating tablet 8 mg Q8H PRN    ondansetron injection 4 mg Daily PRN    oxyCODONE immediate release tablet 10 mg Q6H PRN    oxyCODONE immediate release tablet 5 mg Q6H PRN    pantoprazole EC tablet 40 mg Daily    polyethylene glycol packet 17 g Daily    potassium chloride SA CR tablet 20 mEq Q4H    ramelteon tablet 8 mg Nightly PRN    sodium bicarbonate tablet 1,300 mg BID    sodium chloride 0.9% flush 3 mL PRN    sodium chloride 0.9% flush 3 mL PRN    thiamine tablet 100 mg Daily    valGANciclovir tablet 450 mg Every Mon, Wed, Fri    vancomycin 1 g in dextrose 5 % 250 mL IVPB (ready to mix system) Q24H     Facility-Administered Medications Ordered in Other Encounters   Medication Frequency    0.9%  NaCl infusion Continuous PRN    fentaNYL injection PRN    lidocaine (cardiac) injection PRN    ondansetron injection PRN    propofol (DIPRIVAN) 10 mg/mL infusion PRN     rocuronium injection PRN    succinylcholine injection PRN       Objective:     Vital Signs (Most Recent):  Temp: 99.3 °F (37.4 °C) (12/04/17 1123)  Pulse: 110 (12/04/17 1145)  Resp: 18 (12/04/17 1123)  BP: 104/65 (12/04/17 1123)  SpO2: 99 % (12/04/17 1123)  O2 Device (Oxygen Therapy): room air (12/04/17 1124) Vital Signs (24h Range):  Temp:  [99 °F (37.2 °C)-99.9 °F (37.7 °C)] 99.3 °F (37.4 °C)  Pulse:  [104-128] 110  Resp:  [16-20] 18  SpO2:  [97 %-100 %] 99 %  BP: (104-121)/(54-67) 104/65     Weight: 81.1 kg (178 lb 14.4 oz) (12/04/17 0515)  Body mass index is 28.88 kg/m².  Body surface area is 1.94 meters squared.    I/O last 3 completed shifts:  In: 1040 [P.O.:90; IV Piggyback:950]  Out: 730 [Urine:730]    Physical Exam   Constitutional: He is oriented to person, place, and time. He appears ill. No distress.   HENT:   Head: Normocephalic and atraumatic.   Eyes: Pupils are equal, round, and reactive to light. Scleral icterus is present.   Neck: Normal range of motion. Neck supple.   Cardiovascular: Regular rhythm.  Tachycardia present.  Exam reveals no gallop and no friction rub.    No murmur heard.  Pulmonary/Chest: Effort normal. He has decreased breath sounds in the right lower field and the left lower field. He has no wheezes. He has no rales.   Abdominal: Soft. He exhibits distension. There is tenderness. There is no guarding.   Wound vac to incision. Positive ascitis    Musculoskeletal: He exhibits edema.   Neurological: He is alert and oriented to person, place, and time.   Skin: He is not diaphoretic.   Psychiatric: He has a normal mood and affect. His behavior is normal. Thought content normal.   Nursing note and vitals reviewed.      Significant Labs:  ABGs: No results for input(s): PH, PCO2, HCO3, POCSATURATED, BE in the last 168 hours.  BMP:   Recent Labs  Lab 12/04/17 0536   GLU 87   CL 95   CO2 19*   BUN 47*   CREATININE 4.3*   CALCIUM 7.4*   MG 1.9     CBC:   Recent Labs  Lab 12/04/17 0536   WBC  8.95   RBC 2.87*   HGB 8.7*   HCT 25.2*      MCV 88   MCH 30.3   MCHC 34.5     CMP:   Recent Labs  Lab 12/04/17  0536   GLU 87   CALCIUM 7.4*   ALBUMIN 1.6*   PROT 4.0*   *   K 3.2*   CO2 19*   CL 95   BUN 47*   CREATININE 4.3*   ALKPHOS 126   ALT 5*   AST 14   BILITOT 3.8*     All labs within the past 24 hours have been reviewed.     Significant Imaging:  Labs: Reviewed  X-Ray: Reviewed

## 2017-12-04 NOTE — ASSESSMENT & PLAN NOTE
- WBC elevated to 12.7 from 6.5. Subjective fever 100.5 at home. Recent dx peritonitis on augmentin outpt, now placed on BS antibxs empirically.   - Infectious mosley initiated  - CT abdomen reviewed.

## 2017-12-04 NOTE — NURSING TRANSFER
Nursing Transfer Note      12/4/2017     Transfer To: room 8091 from [facility] 30            Transfer via stretcher    Transfer with cardiac monitoring    Transported by hospital transport    Medicines sent: n/a    Chart send with patient: Yes    Notified: Nurse Tello    Patient reassessed at: 12/4/17 1552    Upon arrival to floor: cardiac monitor applied, call bell in reach and bed in lowest position

## 2017-12-04 NOTE — TRANSFER OF CARE
"Anesthesia Transfer of Care Note    Patient: Jhonny Diana    Procedure(s) Performed: Procedure(s) (LRB):  ERCP (N/A)    Patient location: [facility]    Anesthesia Type: general    Transport from OR: Transported from OR on room air with adequate spontaneous ventilation    Post pain: adequate analgesia    Post assessment: no apparent anesthetic complications    Post vital signs: stable    Level of consciousness: sedated and oriented    Nausea/Vomiting: no nausea/vomiting    Complications: none    Transfer of care protocol was followed      Last vitals:   Visit Vitals  /63   Pulse 106   Temp 36.7 °C (98.1 °F) (Temporal)   Resp 18   Ht 5' 6" (1.676 m)   Wt 81.1 kg (178 lb 14.4 oz)   SpO2 100%   BMI 28.88 kg/m²     "

## 2017-12-04 NOTE — PROGRESS NOTES
[facility]  Liver Transplant  Progress Note    Patient Name: Jhonny Diana  MRN: [id number]  Admission Date: 12/3/2017  Hospital Length of Stay: 1 days  Code Status: Full Code  Primary Care Provider: Primary Doctor No  Post-Operative Day: 46    ORGAN:   LIVER  Disease Etiology: Acute Alcoholic Hepatitis  Donor Type:    - Brain Death  CDC High Risk:   No  Donor CMV Status:   Donor CMV Status: Positive  Donor HBcAB:   Negative  Donor HCV Status:   Negative  Whole or Partial: Whole Liver  Biliary Anastomosis: End to End  Arterial Anatomy: Standard  Subjective:     History of Present Illness:  Mr. Diana is a 28yo man w/a history of alcoholic cirrhosis s/p DDLT 10/19/2017; c/b seizures (swtiched off prograf to cyclo), ATN requiring HD (last HD ), superificial wound infection s/p wound vac to chevron incision, and recent admit for fevers on  (discharged on empiric augmentin for suspected superficial wound infection) and readmitted  again with fever. Found to have peritonitis (WBC 5000, 75% PNM) neg for bile leak. He was treated initially with vanc/cefepime. Repeat cell counts  with some improvement (WBC 1400, 45% PNM). Pt transitioned to augmentin upon d/c .      Pt presented to the ED this morning with subjective fever (100.5) and general malaise beginning at 8pm yesterday. His abdomen is soft, but diffusely tender. Labs reveal elevated WBC count to 12.7 from 6.5 in addition to elevated tbili 3.5 from 2.3 when compared to labs 3 days ago, despite being on augmentin for recent diagnosis of peritonitis. He denies nausea, vomiting, diarrhea, congestion, cough, CP or SOB. He has received 1 L bolus NS in the ED. Infectious mosley intiated, will start BS abx (vanc/cef) and obtain CT abdomen.     He has a wound vac in place.     Hospital Course:  Interval History:  No acute events overnight. Tmax 99.9. Cont with BS antibxs for now. PBS consulted for ERCP given increase in T bili and  recent fevers. Will also plan for paracentesis to reassess ascites fluid as previous para noted with infection. Wound vac in place and recently changed on 12/3. Monitor.     Scheduled Meds:   albumin human 25%  25 g Intravenous Q6H    atovaquone  1,500 mg Oral Daily    ceFEPime (MAXIPIME) IVPB  1 g Intravenous Q12H    cycloSPORINE modified (NEORAL)  100 mg Oral BID    docusate sodium  100 mg Oral TID    [START ON 12/5/2017] ergocalciferol  50,000 Units Oral Q7 Days    [START ON 12/5/2017] heparin (porcine)  5,000 Units Subcutaneous Q8H    levETIRAcetam  500 mg Oral BID    multivitamin  1 tablet Oral Daily    pantoprazole  40 mg Oral Daily    polyethylene glycol  17 g Oral Daily    potassium chloride  20 mEq Oral Q4H    sodium bicarbonate  1,300 mg Oral BID    thiamine  100 mg Oral Daily    valGANciclovir  450 mg Oral Every Mon, Wed, Fri    vancomycin (VANCOCIN) IVPB  1,000 mg Intravenous Q24H     Continuous Infusions:   sodium chloride 0.9% 50 mL/hr at 12/03/17 1113     PRN Meds:sodium chloride, acetaminophen, bisacodyl, ondansetron, oxyCODONE, oxyCODONE, ramelteon, sodium chloride 0.9%    Review of Systems   Constitutional: Positive for activity change, fatigue and fever. Negative for chills.   Respiratory: Negative for cough, shortness of breath and wheezing.    Cardiovascular: Positive for leg swelling.   Gastrointestinal: Positive for abdominal distention, abdominal pain and constipation. Negative for nausea and vomiting.   Genitourinary: Negative for difficulty urinating and dysuria.   Skin: Positive for wound.   Allergic/Immunologic: Positive for immunocompromised state.   Neurological: Positive for weakness.   Psychiatric/Behavioral: Negative for confusion and decreased concentration. The patient is nervous/anxious.      Objective:     Vital Signs (Most Recent):  Temp: 98.1 °F (36.7 °C) (12/04/17 1444)  Pulse: 100 (12/04/17 1530)  Resp: 18 (12/04/17 1530)  BP: 111/67 (12/04/17 1530)  SpO2:  100 % (12/04/17 1530) Vital Signs (24h Range):  Temp:  [98.1 °F (36.7 °C)-99.9 °F (37.7 °C)] 98.1 °F (36.7 °C)  Pulse:  [100-128] 100  Resp:  [16-20] 18  SpO2:  [97 %-100 %] 100 %  BP: (102-121)/(54-67) 111/67     Weight: 81.1 kg (178 lb 14.4 oz)  Body mass index is 28.88 kg/m².    Intake/Output - Last 3 Shifts       12/02 0700 - 12/03 0659 12/03 0700 - 12/04 0659 12/04 0700 - 12/05 0659    P.O.  90 0    I.V. (mL/kg)   600 (7.4)    IV Piggyback 600 350     Total Intake(mL/kg) 600 (7.6) 440 (5.4) 600 (7.4)    Urine (mL/kg/hr)  730 (0.4) 225 (0.3)    Emesis/NG output   0 (0)    Other   0 (0)    Stool   0 (0)    Blood   0 (0)    Total Output   730 225    Net +600 -290 +375           Urine Occurrence   0 x    Stool Occurrence   0 x    Emesis Occurrence   0 x          Physical Exam   Constitutional: He is oriented to person, place, and time. He appears well-developed and well-nourished. No distress.   HENT:   Head: Normocephalic and atraumatic.   Neck: Normal range of motion. Neck supple. No JVD present.   Cardiovascular: Regular rhythm and normal heart sounds.  Tachycardia present.    No murmur heard.  Pulmonary/Chest: Effort normal. No respiratory distress. He has decreased breath sounds in the right middle field, the right lower field and the left lower field. He has no wheezes. He exhibits no tenderness.   Abdominal: Soft. Bowel sounds are normal. He exhibits distension. There is tenderness (diffusely ). There is no rebound and no guarding.   Wound vac to incision   Musculoskeletal: Normal range of motion. He exhibits edema (2+ LE edema). He exhibits no tenderness.   Neurological: He is alert and oriented to person, place, and time. He has normal reflexes.   Skin: Skin is warm and dry. He is not diaphoretic.   Psychiatric: He has a normal mood and affect. His behavior is normal. Judgment and thought content normal.   Nursing note and vitals reviewed.      Laboratory:  Immunosuppressants         Stop Route Frequency      cycloSPORINE modified (NEORAL) capsule 100 mg      -- Oral 2 times daily        CBC:   Recent Labs  Lab 12/04/17  0536   WBC 8.95   RBC 2.87*   HGB 8.7*   HCT 25.2*      MCV 88   MCH 30.3   MCHC 34.5     CMP:   Recent Labs  Lab 12/04/17  0536   GLU 87   CALCIUM 7.4*   ALBUMIN 1.6*   PROT 4.0*   *   K 3.2*   CO2 19*   CL 95   BUN 47*   CREATININE 4.3*   ALKPHOS 126   ALT 5*   AST 14   BILITOT 3.8*     Coagulation:   Recent Labs  Lab 12/04/17  0536   INR 1.7*     Labs within the past 24 hours have been reviewed.    Diagnostic Results:  I have personally reviewed all pertinent imaging studies.    Assessment/Plan:     * Fever    - Cont with BS antibxs for treatment.   - Plan for ERCP today to assess biliary duct.   - wound vac changed on 12/2.               Liver transplanted    - A/w fevers and hyperbilirubinemia.   - Tbili elevated 3.5 from 2.3. LFTs stable. CT abdomen reviewed.  - PBS consulted. ERCP today. Monitor  - Para in am.          LAURA (acute kidney injury)    - Cr level remains elevated. No need for HD at this time.   - Last HD 11/17. UOP good.    - Cont strict I/O's. Plan for albumin for hydration.           Seizure    - Cont keppra          Prophylactic immunotherapy    - See long term immunosuppressant          Long-term use of immunosuppressant medication    Cont cyclo. Monitor for toxicities and adjust dose accordingly          Hyperbilirubinemia    - elevated t bili post liver txp in setting of abdominal pain, decreased appetitive and intermittent nausea.  PBS consulted to assess duct per ERCP.  - ERCP performed today but PBS unable to cannulate. Will plan for MRCP today to assess for stricture and if so PBS to plan for repeat ERCP in 48 hrs.   - NPO past MN.         Peritonitis    - Diagnosed on previous admission.  Last para 11/29 with improving cell counts WBC 1400 with 45% PNMs   Was d/c'ed on augmentin, will switch to vanc/cef (see leukocytosis)             Leukocytosis    - WBC  elevated to 12.7 from 6.5. Subjective fever 100.5 at home. Recent dx peritonitis on augmentin outpt, now placed on BS antibxs empirically.   - Infectious mosley initiated  - CT abdomen reviewed.          Delayed surgical wound healing    - Wound vac in place, last changed 12/2.          Tachycardia    - W/ improvement.   - monitor.             VTE Risk Mitigation         Ordered     heparin (porcine) injection 5,000 Units  Every 8 hours     Route:  Subcutaneous        12/04/17 0933     Medium Risk of VTE  Once      12/03/17 0249     Place sequential compression device  Until discontinued      12/03/17 0249          The patients clinical status was discussed at multidisplinary rounds, involving transplant surgery, transplant medicine, pharmacy, nursing, nutrition, and social work    Discharge Planning:  Not a candidate for d/c at this time.     Jin Perez, NP  Liver Transplant  [facility]

## 2017-12-04 NOTE — ASSESSMENT & PLAN NOTE
- A/w fevers and hyperbilirubinemia.   - Tbili elevated 3.5 from 2.3. LFTs stable. CT abdomen reviewed.  - PBS consulted. ERCP today. Monitor  - Para in am.

## 2017-12-04 NOTE — ASSESSMENT & PLAN NOTE
- Cr level remains elevated. No need for HD at this time.   - Last HD 11/17. UOP good.    - Cont strict I/O's. Plan for albumin for hydration.

## 2017-12-05 ENCOUNTER — ANESTHESIA EVENT (OUTPATIENT)
Dept: ENDOSCOPY | Facility: HOSPITAL | Age: 27
DRG: 949 | End: 2017-12-05
Payer: COMMERCIAL

## 2017-12-05 PROBLEM — R00.0 TACHYCARDIA: Status: RESOLVED | Noted: 2017-10-04 | Resolved: 2017-12-05

## 2017-12-05 PROBLEM — E46 MALNUTRITION: Status: ACTIVE | Noted: 2017-12-05

## 2017-12-05 LAB
ABO + RH BLD: NORMAL
ALBUMIN SERPL BCP-MCNC: 2 G/DL
ALP SERPL-CCNC: 104 U/L
ALT SERPL W/O P-5'-P-CCNC: <5 U/L
ANION GAP SERPL CALC-SCNC: 14 MMOL/L
ANISOCYTOSIS BLD QL SMEAR: SLIGHT
APPEARANCE FLD: NORMAL
AST SERPL-CCNC: 13 U/L
BASOPHILS # BLD AUTO: ABNORMAL K/UL
BASOPHILS NFR BLD: 0 %
BILIRUB DIRECT SERPL-MCNC: 4.7 MG/DL
BILIRUB FLD-MCNC: 2.5 MG/DL
BILIRUB SERPL-MCNC: 6.2 MG/DL
BLD GP AB SCN CELLS X3 SERPL QL: NORMAL
BLD PROD TYP BPU: NORMAL
BLOOD UNIT EXPIRATION DATE: NORMAL
BLOOD UNIT TYPE CODE: 5100
BLOOD UNIT TYPE: NORMAL
BODY FLD TYPE: NORMAL
BODY FLUID SOURCE, BILIRUBIN: NORMAL
BODY FLUID SOURCE, CREATININE: NORMAL
BUN SERPL-MCNC: 49 MG/DL
CALCIUM SERPL-MCNC: 7.9 MG/DL
CHLORIDE SERPL-SCNC: 97 MMOL/L
CO2 SERPL-SCNC: 18 MMOL/L
CODING SYSTEM: NORMAL
COLOR FLD: YELLOW
CREAT FLD-MCNC: 5.2 MG/DL
CREAT SERPL-MCNC: 4.7 MG/DL
CYCLOSPORINE BLD LC/MS/MS-MCNC: 222 NG/ML
DIFFERENTIAL METHOD: ABNORMAL
DISPENSE STATUS: NORMAL
EOSINOPHIL # BLD AUTO: ABNORMAL K/UL
EOSINOPHIL NFR BLD: 0 %
ERYTHROCYTE [DISTWIDTH] IN BLOOD BY AUTOMATED COUNT: 17.7 %
EST. GFR  (AFRICAN AMERICAN): 18.2 ML/MIN/1.73 M^2
EST. GFR  (NON AFRICAN AMERICAN): 15.7 ML/MIN/1.73 M^2
GLUCOSE SERPL-MCNC: 79 MG/DL
GRAM STN SPEC: NORMAL
GRAM STN SPEC: NORMAL
HCT VFR BLD AUTO: 22 %
HGB BLD-MCNC: 7.4 G/DL
HYPOCHROMIA BLD QL SMEAR: ABNORMAL
IMM GRANULOCYTES # BLD AUTO: ABNORMAL K/UL
IMM GRANULOCYTES NFR BLD AUTO: ABNORMAL %
INR PPP: 1.7
LYMPHOCYTES # BLD AUTO: ABNORMAL K/UL
LYMPHOCYTES NFR BLD: 19 %
LYMPHOCYTES NFR FLD MANUAL: 8 %
MAGNESIUM SERPL-MCNC: 1.8 MG/DL
MCH RBC QN AUTO: 31 PG
MCHC RBC AUTO-ENTMCNC: 33.6 G/DL
MCV RBC AUTO: 92 FL
MONOCYTES # BLD AUTO: ABNORMAL K/UL
MONOCYTES NFR BLD: 11 %
MONOS+MACROS NFR FLD MANUAL: 52 %
NEUTROPHILS NFR BLD: 70 %
NEUTROPHILS NFR FLD MANUAL: 40 %
NRBC BLD-RTO: 0 /100 WBC
PHOSPHATE SERPL-MCNC: 3.5 MG/DL
PLATELET # BLD AUTO: 256 K/UL
PLATELET BLD QL SMEAR: ABNORMAL
PMV BLD AUTO: 10.3 FL
POTASSIUM SERPL-SCNC: 3.6 MMOL/L
PROT SERPL-MCNC: 4.3 G/DL
PROTHROMBIN TIME: 16.9 SEC
RBC # BLD AUTO: 2.39 M/UL
SODIUM SERPL-SCNC: 129 MMOL/L
TRANS ERYTHROCYTES VOL PATIENT: NORMAL ML
WBC # BLD AUTO: 6.84 K/UL
WBC # FLD: 780 /CU MM

## 2017-12-05 PROCEDURE — 36415 COLL VENOUS BLD VENIPUNCTURE: CPT

## 2017-12-05 PROCEDURE — 63600175 PHARM REV CODE 636 W HCPCS: Performed by: INTERNAL MEDICINE

## 2017-12-05 PROCEDURE — 63600175 PHARM REV CODE 636 W HCPCS: Performed by: NURSE PRACTITIONER

## 2017-12-05 PROCEDURE — 0W9G30Z DRAINAGE OF PERITONEAL CAVITY WITH DRAINAGE DEVICE, PERCUTANEOUS APPROACH: ICD-10-PCS | Performed by: RADIOLOGY

## 2017-12-05 PROCEDURE — 25000003 PHARM REV CODE 250: Performed by: NURSE PRACTITIONER

## 2017-12-05 PROCEDURE — 87070 CULTURE OTHR SPECIMN AEROBIC: CPT

## 2017-12-05 PROCEDURE — 82570 ASSAY OF URINE CREATININE: CPT

## 2017-12-05 PROCEDURE — 86920 COMPATIBILITY TEST SPIN: CPT

## 2017-12-05 PROCEDURE — P9021 RED BLOOD CELLS UNIT: HCPCS

## 2017-12-05 PROCEDURE — 99233 SBSQ HOSP IP/OBS HIGH 50: CPT | Mod: 24,,, | Performed by: NURSE PRACTITIONER

## 2017-12-05 PROCEDURE — 82247 BILIRUBIN TOTAL: CPT

## 2017-12-05 PROCEDURE — 97802 MEDICAL NUTRITION INDIV IN: CPT | Performed by: DIETITIAN, REGISTERED

## 2017-12-05 PROCEDURE — 86900 BLOOD TYPING SEROLOGIC ABO: CPT

## 2017-12-05 PROCEDURE — 80158 DRUG ASSAY CYCLOSPORINE: CPT

## 2017-12-05 PROCEDURE — 89051 BODY FLUID CELL COUNT: CPT

## 2017-12-05 PROCEDURE — 87075 CULTR BACTERIA EXCEPT BLOOD: CPT

## 2017-12-05 PROCEDURE — 20600001 HC STEP DOWN PRIVATE ROOM

## 2017-12-05 PROCEDURE — 80053 COMPREHEN METABOLIC PANEL: CPT

## 2017-12-05 PROCEDURE — 84478 ASSAY OF TRIGLYCERIDES: CPT

## 2017-12-05 PROCEDURE — 99232 SBSQ HOSP IP/OBS MODERATE 35: CPT | Mod: ,,, | Performed by: INTERNAL MEDICINE

## 2017-12-05 PROCEDURE — 63600175 PHARM REV CODE 636 W HCPCS: Performed by: PHYSICIAN ASSISTANT

## 2017-12-05 PROCEDURE — 85007 BL SMEAR W/DIFF WBC COUNT: CPT

## 2017-12-05 PROCEDURE — 87205 SMEAR GRAM STAIN: CPT

## 2017-12-05 PROCEDURE — 85027 COMPLETE CBC AUTOMATED: CPT

## 2017-12-05 PROCEDURE — 86901 BLOOD TYPING SEROLOGIC RH(D): CPT

## 2017-12-05 PROCEDURE — 84100 ASSAY OF PHOSPHORUS: CPT

## 2017-12-05 PROCEDURE — 82248 BILIRUBIN DIRECT: CPT

## 2017-12-05 PROCEDURE — 83735 ASSAY OF MAGNESIUM: CPT

## 2017-12-05 PROCEDURE — 25000003 PHARM REV CODE 250: Performed by: PHYSICIAN ASSISTANT

## 2017-12-05 PROCEDURE — 85610 PROTHROMBIN TIME: CPT

## 2017-12-05 RX ORDER — ONDANSETRON 2 MG/ML
4 INJECTION INTRAMUSCULAR; INTRAVENOUS EVERY 8 HOURS PRN
Status: DISCONTINUED | OUTPATIENT
Start: 2017-12-05 | End: 2017-12-20 | Stop reason: HOSPADM

## 2017-12-05 RX ORDER — HYDROCODONE BITARTRATE AND ACETAMINOPHEN 500; 5 MG/1; MG/1
TABLET ORAL
Status: DISCONTINUED | OUTPATIENT
Start: 2017-12-06 | End: 2017-12-06

## 2017-12-05 RX ORDER — OXYCODONE HYDROCHLORIDE 5 MG/1
5 TABLET ORAL EVERY 4 HOURS PRN
Status: DISCONTINUED | OUTPATIENT
Start: 2017-12-05 | End: 2017-12-12

## 2017-12-05 RX ORDER — FUROSEMIDE 10 MG/ML
120 INJECTION INTRAMUSCULAR; INTRAVENOUS 2 TIMES DAILY
Status: COMPLETED | OUTPATIENT
Start: 2017-12-05 | End: 2017-12-05

## 2017-12-05 RX ORDER — OXYCODONE HYDROCHLORIDE 5 MG/1
10 TABLET ORAL EVERY 4 HOURS PRN
Status: DISCONTINUED | OUTPATIENT
Start: 2017-12-05 | End: 2017-12-12

## 2017-12-05 RX ORDER — HYDROCODONE BITARTRATE AND ACETAMINOPHEN 500; 5 MG/1; MG/1
TABLET ORAL
Status: DISCONTINUED | OUTPATIENT
Start: 2017-12-05 | End: 2017-12-06

## 2017-12-05 RX ORDER — PHYTONADIONE 5 MG/1
10 TABLET ORAL ONCE
Status: COMPLETED | OUTPATIENT
Start: 2017-12-05 | End: 2017-12-05

## 2017-12-05 RX ADMIN — THERA TABS 1 TABLET: TAB at 09:12

## 2017-12-05 RX ADMIN — LEVETIRACETAM 500 MG: 500 TABLET, FILM COATED ORAL at 08:12

## 2017-12-05 RX ADMIN — SODIUM BICARBONATE 650 MG TABLET 1300 MG: at 09:12

## 2017-12-05 RX ADMIN — CYCLOSPORINE 100 MG: 100 CAPSULE, LIQUID FILLED ORAL at 06:12

## 2017-12-05 RX ADMIN — VANCOMYCIN HYDROCHLORIDE 1000 MG: 1 INJECTION, POWDER, LYOPHILIZED, FOR SOLUTION INTRAVENOUS at 04:12

## 2017-12-05 RX ADMIN — DOCUSATE SODIUM 100 MG: 100 CAPSULE, LIQUID FILLED ORAL at 09:12

## 2017-12-05 RX ADMIN — DOCUSATE SODIUM 100 MG: 100 CAPSULE, LIQUID FILLED ORAL at 08:12

## 2017-12-05 RX ADMIN — OXYCODONE HYDROCHLORIDE 10 MG: 5 TABLET ORAL at 09:12

## 2017-12-05 RX ADMIN — ATOVAQUONE 1500 MG: 750 SUSPENSION ORAL at 09:12

## 2017-12-05 RX ADMIN — FUROSEMIDE 120 MG: 10 INJECTION, SOLUTION INTRAMUSCULAR; INTRAVENOUS at 08:12

## 2017-12-05 RX ADMIN — POLYETHYLENE GLYCOL 3350 17 G: 17 POWDER, FOR SOLUTION ORAL at 09:12

## 2017-12-05 RX ADMIN — PANTOPRAZOLE SODIUM 40 MG: 40 TABLET, DELAYED RELEASE ORAL at 09:12

## 2017-12-05 RX ADMIN — CYCLOSPORINE 100 MG: 100 CAPSULE, LIQUID FILLED ORAL at 09:12

## 2017-12-05 RX ADMIN — SODIUM BICARBONATE 650 MG TABLET 1300 MG: at 08:12

## 2017-12-05 RX ADMIN — LEVETIRACETAM 500 MG: 500 TABLET, FILM COATED ORAL at 09:12

## 2017-12-05 RX ADMIN — Medication 100 MG: at 09:12

## 2017-12-05 RX ADMIN — CEFEPIME HYDROCHLORIDE 1 G: 1 INJECTION, POWDER, FOR SOLUTION INTRAMUSCULAR; INTRAVENOUS at 10:12

## 2017-12-05 RX ADMIN — OXYCODONE HYDROCHLORIDE 10 MG: 5 TABLET ORAL at 07:12

## 2017-12-05 RX ADMIN — FUROSEMIDE 120 MG: 10 INJECTION, SOLUTION INTRAMUSCULAR; INTRAVENOUS at 02:12

## 2017-12-05 RX ADMIN — ERGOCALCIFEROL 50000 UNITS: 1.25 CAPSULE ORAL at 09:12

## 2017-12-05 RX ADMIN — HEPARIN SODIUM 5000 UNITS: 5000 INJECTION, SOLUTION INTRAVENOUS; SUBCUTANEOUS at 08:12

## 2017-12-05 RX ADMIN — OXYCODONE HYDROCHLORIDE 10 MG: 5 TABLET ORAL at 12:12

## 2017-12-05 RX ADMIN — PHYTONADIONE 10 MG: 5 TABLET ORAL at 12:12

## 2017-12-05 RX ADMIN — DOCUSATE SODIUM 100 MG: 100 CAPSULE, LIQUID FILLED ORAL at 02:12

## 2017-12-05 RX ADMIN — CEFEPIME HYDROCHLORIDE 1 G: 1 INJECTION, POWDER, FOR SOLUTION INTRAMUSCULAR; INTRAVENOUS at 03:12

## 2017-12-05 RX ADMIN — HEPARIN SODIUM 5000 UNITS: 5000 INJECTION, SOLUTION INTRAVENOUS; SUBCUTANEOUS at 05:12

## 2017-12-05 RX ADMIN — ONDANSETRON 8 MG: 8 TABLET, ORALLY DISINTEGRATING ORAL at 08:12

## 2017-12-05 RX ADMIN — ONDANSETRON 8 MG: 8 TABLET, ORALLY DISINTEGRATING ORAL at 01:12

## 2017-12-05 NOTE — CONSULTS
"  [facility]  Adult Nutrition  Consult Note    SUMMARY     Recommendations    Recommendation/Intervention: Pt with poor intake since OLTx 10/2017 due to ascites and abdominal fullness.     Continue renal diet with Novasource TID. Encouraged patient to consume oral diet and Novasouce, despite food preferences.      If oral intake does not improve in next 48 hours and if medically able, recommend initating enteral nutrition. Novasource Renal at goal rate of 40mL/hr will provide 1920kcal, 87g protein, and 688mL free fluid. Flushes per MD.     If unable to initiate EN and oral intake remains negligible >48 hours, recommend TPN. Recommend 98g AA/355g dextrose, fluid and lytes per pharmacy.     RD following.     Goals: Adequate nutrition PO vs EN  Nutrition Goal Status: new  Communication of RD Recs: reviewed with physician      Reason for Assessment    Reason for Assessment: physician consult  Diagnosis: transplant/postoperative complications (peritonitis)  Relevent Medical History: OLTx 10/19/2017, complicated post-op w/ seizures and LAURA, last HD 11/17, cirrhosis 2' EtOH   Interdisciplinary Rounds: attended     General Information Comments: readmit w/ fever and peritonitis, pt eating poorly since transplant but reports "improved", pt is picky eater and has difficulty adhering to recommendations, has eaten almost nothing this admit 2' many days being NPO and renal diet restrictions frustrating on days he is allowed to eat    Nutrition Discharge Planning: Adequate PO intake    Nutrition Prescription Ordered    Current Diet Order: NPO  Nutrition Order Comments: On and off Renal/NPO      Oral Nutrition Supplement: Novasource     Evaluation of Received Nutrients/Fluid Intake   % Intake of Estimated Energy Needs: 25 - 50 %  % Meal Intake: 25%       Nutrition/Diet History    Patient Reported Diet/Restrictions/Preferences: renal  Typical Food/Fluid Intake: Pt eats 2 meals per day, at eats light at those " "times due to abdominal fullness  Food Preferences: No cultural or Alevism food preferences noted        Factors Affecting Nutritional Intake: abdominal distention, nausea/vomiting, NPO, early satiety        Labs/Tests/Procedures/Meds     Pertinent Labs Reviewed: reviewed  Pertinent Labs Comments: Na 129, BUN 49, gfr 15.7, TBili 6.2  Pertinent Medications Reviewed: reviewed  Pertinent Medications Comments: thiamin, pantoprazole, docusate, MVI, lasix, ergocalciferol    Physical Findings    Overall Physical Appearance: edematous, loss of muscle mass     Oral/Mouth Cavity: WDL  Skin: intact, jaundice    Anthropometrics    Temp: 98.8 °F (37.1 °C)     Height: 5' 6" (167.6 cm)  Weight Method: Bed Scale  Weight: 81.1 kg (178 lb 14.4 oz)  Ideal Body Weight (IBW), Male: 142 lb     % Ideal Body Weight, Male (lb): 123.24 lb     BMI (Calculated): 28.3  BMI Grade: 25 - 29.9 - overweight     Usual Body Weight (UBW), kg:  (fluctuates 2' fluid shifts)        Estimated/Assessed Needs    Weight Used For Calorie Calculations: 81.1 kg (178 lb 12.7 oz)   Height (cm): 167.6 cm  Energy Calorie Requirements (kcal): 2155  Energy Need Method: [place]-St Jeor (x1.25 PAL)      RMR ([place]-St. Jeor Equation): 1723.75      Weight Used For Protein Calculations: 81.1 kg (178 lb 12.7 oz)  Protein Requirements: 97-114g (1.2-1.4 g/kg)     Fluid Need Method: RDA Method      RDA Method (mL): 2155             Assessment and Plan    Malnutrition    Nutrition Diagnosis:  Inadequate energy intake    Related to (etiology):   Poor appetite 2' ascites and abdominal distention, poor food choices    Signs and Symptoms (as evidenced by):   Pt consuming ~25-50% EEN and EPN    Interventions/Recommendations (treatment strategy):  See recs    Nutrition Diagnosis Status:   New              Monitor and Evaluation    Food and Nutrient Intake: energy intake, food and beverage intake  Food and Nutrient Adminstration: diet order  Knowledge/Beliefs/Attitudes: food and " nutrition knowledge/skill     Anthropometric Measurements: weight, weight change, body mass index  Biochemical Data, Medical Tests and Procedures: electrolyte and renal panel, gastrointestinal profile, glucose/endocrine profile, inflammatory profile, lipid profile  Nutrition-Focused Physical Findings: overall appearance    Nutrition Risk    Level of Risk:  (2x/week)    Nutrition Follow-Up

## 2017-12-05 NOTE — ASSESSMENT & PLAN NOTE
- A/w fevers and hyperbilirubinemia.   - Tbili with increase again this am. CT abdomen reviewed.  - LFTs stable.   - PBS consulted. ERCP attempted on 12/4 but unable to cannulate.   - PBS to attempt ERCP again tomorrow as MRCP on 12/4 noted with stricture.   - Para planned for today.

## 2017-12-05 NOTE — SUBJECTIVE & OBJECTIVE
Interval History: NAEON Afebrile. HE endorses not being able to drink much secondary to Procedures and NPO status. He received albumin yesterday.  sCr increased to 4.7 mg/dl. UO decreased 495 ml overnight. CO2 18 this am.    Review of patient's allergies indicates:   Allergen Reactions    Bactrim [sulfamethoxazole-trimethoprim] Other (See Comments)     Mookie Trell Syndrome     Current Facility-Administered Medications   Medication Frequency    0.9%  NaCl infusion (for blood administration) Q24H PRN    0.9%  NaCl infusion (for blood administration) Q24H PRN    [START ON 12/6/2017] 0.9%  NaCl infusion (for blood administration) Q24H PRN    acetaminophen tablet 650 mg Q8H PRN    atovaquone suspension 1,500 mg Daily    bisacodyl suppository 10 mg Daily PRN    cefepime in dextrose 5 % 1 gram/50 mL IVPB 1 g Q12H    cycloSPORINE modified (NEORAL) capsule 100 mg BID    docusate sodium capsule 100 mg TID    ergocalciferol capsule 50,000 Units Q7 Days    furosemide injection 120 mg BID    heparin (porcine) injection 5,000 Units Q8H    levETIRAcetam tablet 500 mg BID    lorazepam injection 1 mg Once    multivitamin tablet 1 tablet Daily    ondansetron disintegrating tablet 8 mg Q8H PRN    ondansetron injection 4 mg Q8H PRN    oxyCODONE immediate release tablet 10 mg Q4H PRN    oxyCODONE immediate release tablet 5 mg Q4H PRN    pantoprazole EC tablet 40 mg Daily    polyethylene glycol packet 17 g Daily    ramelteon tablet 8 mg Nightly PRN    sodium bicarbonate tablet 1,300 mg BID    sodium chloride 0.9% flush 3 mL PRN    thiamine tablet 100 mg Daily    valGANciclovir tablet 450 mg Every Mon, Wed, Fri    vancomycin 1 g in dextrose 5 % 250 mL IVPB (ready to mix system) Q24H       Objective:     Vital Signs (Most Recent):  Temp: 98.9 °F (37.2 °C) (12/05/17 1519)  Pulse: 100 (12/05/17 1539)  Resp: 18 (12/05/17 1519)  BP: 110/67 (12/05/17 1519)  SpO2: 98 % (12/05/17 1519)  O2 Device (Oxygen Therapy):  room air (12/05/17 1519) Vital Signs (24h Range):  Temp:  [98.1 °F (36.7 °C)-99.8 °F (37.7 °C)] 98.9 °F (37.2 °C)  Pulse:  [] 100  Resp:  [18] 18  SpO2:  [98 %-100 %] 98 %  BP: (103-115)/(56-68) 110/67     Weight: 81.1 kg (178 lb 14.4 oz) (12/04/17 0515)  Body mass index is 28.88 kg/m².  Body surface area is 1.94 meters squared.    I/O last 3 completed shifts:  In: 4030 [P.O.:830; I.V.:2500; IV Piggyback:700]  Out: 870 [Urine:870]    Physical Exam   Constitutional: He is oriented to person, place, and time. He appears ill. No distress.   HENT:   Head: Normocephalic and atraumatic.   Eyes: Pupils are equal, round, and reactive to light. Scleral icterus is present.   Neck: Normal range of motion. Neck supple.   Cardiovascular: Regular rhythm.  Tachycardia present.  Exam reveals no gallop and no friction rub.    No murmur heard.  Pulmonary/Chest: Effort normal. He has decreased breath sounds in the right lower field and the left lower field. He has no wheezes. He has no rales.   Abdominal: Soft. He exhibits distension (positive asitis ). There is tenderness (2+ pitting edema in LE ). There is no guarding.   Wound vac to incision. Positive ascitis    Musculoskeletal: He exhibits edema.   Neurological: He is alert and oriented to person, place, and time.   Skin: He is not diaphoretic.   Psychiatric: He has a normal mood and affect. His behavior is normal. Thought content normal.   Nursing note and vitals reviewed.      Significant Labs:  ABGs: No results for input(s): PH, PCO2, HCO3, POCSATURATED, BE in the last 168 hours.  BMP:     Recent Labs  Lab 12/05/17  0559   GLU 79   CL 97   CO2 18*   BUN 49*   CREATININE 4.7*   CALCIUM 7.9*   MG 1.8     CBC:     Recent Labs  Lab 12/05/17  0559   WBC 6.84   RBC 2.39*   HGB 7.4*   HCT 22.0*      MCV 92   MCH 31.0   MCHC 33.6     CMP:     Recent Labs  Lab 12/05/17  0559   GLU 79   CALCIUM 7.9*   ALBUMIN 2.0*   PROT 4.3*   *   K 3.6   CO2 18*   CL 97   BUN 49*    CREATININE 4.7*   ALKPHOS 104   ALT <5*   AST 13   BILITOT 6.2*     All labs within the past 24 hours have been reviewed.     Significant Imaging:  Labs: Reviewed  X-Ray: Reviewed

## 2017-12-05 NOTE — PLAN OF CARE
Problem: Patient Care Overview  Goal: Plan of Care Review  Outcome: Ongoing (interventions implemented as appropriate)  POC reviewed w/ pt and mother this am to include increasing activity, increasing PO intake, possible paracentesis, possible ERCP, wound vac change, paint control, nausea control, and safety.  Pt ambulating in room independently w/ stand by assist.  Pt's pain well controlled w/ prn pain medication.  Pt unable to tolerate PO intake, Zofran given for prn nausea.  Pt had paracentesis this afternoon, 3 L removed per IR - drain fluid sent for testing.  Pt to be NPO after midnight for ERCP in am.  Pt to have 1 unit FFP in AM then 1 hour post infusion PT/INR drawn.  Zoë, wound care RN to change wound vac tomorrow - attempted to change today while patient was in IR.  Pt's R subclavian permcath dressing to be changed this evening.  Vanc trough to be drawn before next dose.

## 2017-12-05 NOTE — PROGRESS NOTES
Presently off of the unit in a paracentesis procedure . Will schedule to see tomorrow for the wound vac dressing change.   Zoë Murphy RN CWON  [id number]

## 2017-12-05 NOTE — ASSESSMENT & PLAN NOTE
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 He was admitted for peritonitis from 11/24-11/30 for peritonitis.     Plan:   - LAURA on CKD 4 (baseline sCr 3.9 - 4.0). Likely instrinsic etiology, multifactorial secondary to sepsis vs. Toxins  - Increase sodium biacarbonate 1,300 mg q TID for acidosis. Please.   - Need ABG. Ordered this am.  Discussed with primary team.  - Please start Lasix 120 mg BID  - Strict inatake and output, UO overnight since admission.  - Will have paracentesis and ERCP plan for today  - Please give albumin with paracenthesis  - Avoid nephrotoxic medication, renal dose medications and avoid contrast   - Renal diet  - No need for RRT

## 2017-12-05 NOTE — PLAN OF CARE
Problem: Patient Care Overview  Goal: Plan of Care Review  Outcome: Ongoing (interventions implemented as appropriate)  Fall precautions maintained today. No falls or injuries noted. Afebrile, IV abx continued. Pain medication given as needed for c/o abdominal pain -relief noted. Patient off unit for ERCP today -procedure unsuccessful. Patient is to have a MRCP tonight and a possible ERCP either tomorrow or Wednesday per team. Patient to be NPO after midnight tonight. Wound vac in placed -wound care consulted. Vac to be changed tomorrow. Patient up with 1 person assit and a walker. Mother at bedside and attentive to patient's needs. Telemetry continued. All person belongings in close reach.

## 2017-12-05 NOTE — TREATMENT PLAN
AES Follow-up Note    MRI reviewed, will plan for repeat ERCP tomorrow.  Will need INR < 1.5 prior to the procedure and heparin products held tomorrow.  Notified LTS.  NPO at midnight.    Please do not hesitate to call us with questions.    Shen Sousa  Gastroenterology Fellow (PGY 6)

## 2017-12-05 NOTE — PROGRESS NOTES
While Charge Rizwana Howe was en route to Mri to admin ativan to pt so that pt could complete imaging, Mri tech called this Rn stating pt was asking for anxiety and pain med. This Rn explained that Loretta Rn was on her way with the Ativan and pt could not have pain med right now.

## 2017-12-05 NOTE — SUBJECTIVE & OBJECTIVE
Scheduled Meds:   atovaquone  1,500 mg Oral Daily    ceFEPime (MAXIPIME) IVPB  1 g Intravenous Q12H    cycloSPORINE modified (NEORAL)  100 mg Oral BID    docusate sodium  100 mg Oral TID    ergocalciferol  50,000 Units Oral Q7 Days    furosemide  120 mg Intravenous BID    heparin (porcine)  5,000 Units Subcutaneous Q8H    levETIRAcetam  500 mg Oral BID    lorazepam  1 mg Intravenous Once    multivitamin  1 tablet Oral Daily    pantoprazole  40 mg Oral Daily    polyethylene glycol  17 g Oral Daily    sodium bicarbonate  1,300 mg Oral BID    thiamine  100 mg Oral Daily    valGANciclovir  450 mg Oral Every Mon, Wed, Fri    vancomycin (VANCOCIN) IVPB  1,000 mg Intravenous Q24H     Continuous Infusions:   PRN Meds:sodium chloride, sodium chloride, [START ON 12/6/2017] sodium chloride, acetaminophen, bisacodyl, ondansetron, ondansetron, oxyCODONE, oxyCODONE, ramelteon, sodium chloride 0.9%    Review of Systems   Constitutional: Positive for activity change, fatigue and fever. Negative for chills.   Respiratory: Negative for cough, shortness of breath and wheezing.    Cardiovascular: Positive for leg swelling.   Gastrointestinal: Positive for abdominal distention, abdominal pain and constipation. Negative for nausea and vomiting.   Genitourinary: Negative for difficulty urinating and dysuria.   Skin: Positive for wound.   Allergic/Immunologic: Positive for immunocompromised state.   Neurological: Positive for weakness.   Psychiatric/Behavioral: Negative for confusion and decreased concentration. The patient is nervous/anxious.      Objective:     Vital Signs (Most Recent):  Temp: 98.8 °F (37.1 °C) (12/05/17 1239)  Pulse: 100 (12/05/17 1239)  Resp: 18 (12/05/17 1239)  BP: 108/65 (12/05/17 1239)  SpO2: 99 % (12/05/17 1239) Vital Signs (24h Range):  Temp:  [98.1 °F (36.7 °C)-99.8 °F (37.7 °C)] 98.8 °F (37.1 °C)  Pulse:  [] 100  Resp:  [18] 18  SpO2:  [98 %-100 %] 99 %  BP: (102-115)/(56-68) 108/65      Weight: 81.1 kg (178 lb 14.4 oz)  Body mass index is 28.88 kg/m².    Intake/Output - Last 3 Shifts       12/03 0700 - 12/04 0659 12/04 0700 - 12/05 0659 12/05 0700 - 12/06 0659    P.O. 90 740     I.V. (mL/kg)  2500 (30.8)     IV Piggyback 350 350     Total Intake(mL/kg) 440 (5.4) 3590 (44.3)     Urine (mL/kg/hr) 730 (0.4) 495 (0.3)     Emesis/NG output  0 (0)     Other  0 (0)     Stool  0 (0)     Blood  0 (0)     Total Output 730 495      Net -290 +3095             Urine Occurrence  0 x     Stool Occurrence  0 x     Emesis Occurrence  0 x           Physical Exam   Constitutional: He is oriented to person, place, and time. He appears well-developed and well-nourished. No distress.   HENT:   Head: Normocephalic and atraumatic.   Neck: Normal range of motion. Neck supple. No JVD present.   Cardiovascular: Regular rhythm and normal heart sounds.  Tachycardia present.    No murmur heard.  Pulmonary/Chest: Effort normal. No respiratory distress. He has decreased breath sounds in the right middle field, the right lower field and the left lower field. He has no wheezes. He exhibits no tenderness.   Abdominal: Soft. Bowel sounds are normal. He exhibits distension. There is tenderness (diffusely ). There is no rebound and no guarding.   Wound vac to incision   Musculoskeletal: Normal range of motion. He exhibits edema (2+ LE edema). He exhibits no tenderness.   Neurological: He is alert and oriented to person, place, and time. He has normal reflexes.   Skin: Skin is warm and dry. He is not diaphoretic.   Psychiatric: He has a normal mood and affect. His behavior is normal. Judgment and thought content normal.   Nursing note and vitals reviewed.      Laboratory:  Immunosuppressants         Stop Route Frequency     cycloSPORINE modified (NEORAL) capsule 100 mg      -- Oral 2 times daily        CBC:   Recent Labs  Lab 12/05/17  0559   WBC 6.84   RBC 2.39*   HGB 7.4*   HCT 22.0*      MCV 92   MCH 31.0   MCHC 33.6      CMP:   Recent Labs  Lab 12/05/17  0559   GLU 79   CALCIUM 7.9*   ALBUMIN 2.0*   PROT 4.3*   *   K 3.6   CO2 18*   CL 97   BUN 49*   CREATININE 4.7*   ALKPHOS 104   ALT <5*   AST 13   BILITOT 6.2*     Coagulation:   Recent Labs  Lab 12/05/17  0559   INR 1.7*     Cardiac Markers: No results for input(s): CKMB, TROPONINT, MYOGLOBIN in the last 168 hours.  Labs within the past 24 hours have been reviewed.    Diagnostic Results:  I have personally reviewed all pertinent imaging studies.

## 2017-12-05 NOTE — PROGRESS NOTES
[facility]  Liver Transplant  Progress Note    Patient Name: Jhonny Diana  MRN: [id number]  Admission Date: 12/3/2017  Hospital Length of Stay: 2 days  Code Status: Full Code  Primary Care Provider: Primary Doctor No  Post-Operative Day: 47    ORGAN:   LIVER  Disease Etiology: Acute Alcoholic Hepatitis  Donor Type:    - Brain Death  CDC High Risk:   No  Donor CMV Status:   Donor CMV Status: Positive  Donor HBcAB:   Negative  Donor HCV Status:   Negative  Whole or Partial: Whole Liver  Biliary Anastomosis: End to End  Arterial Anatomy: Standard  Subjective:     History of Present Illness:  Mr. Diana is a 26yo man w/a history of alcoholic cirrhosis s/p DDLT 10/19/2017; c/b seizures (swtiched off prograf to cyclo), ATN requiring HD (last HD ), superificial wound infection s/p wound vac to chevron incision, and recent admit for fevers on  (discharged on empiric augmentin for suspected superficial wound infection) and readmitted  again with fever. Found to have peritonitis (WBC 5000, 75% PNM) neg for bile leak. He was treated initially with vanc/cefepime. Repeat cell counts  with some improvement (WBC 1400, 45% PNM). Pt transitioned to augmentin upon d/c .      Pt presented to the ED this morning with subjective fever (100.5) and general malaise beginning at 8pm yesterday. His abdomen is soft, but diffusely tender. Labs reveal elevated WBC count to 12.7 from 6.5 in addition to elevated tbili 3.5 from 2.3 when compared to labs 3 days ago, despite being on augmentin for recent diagnosis of peritonitis. He denies nausea, vomiting, diarrhea, congestion, cough, CP or SOB. He has received 1 L bolus NS in the ED. Infectious mosley intiated, will start BS abx (vanc/cef) and obtain CT abdomen.     He has a wound vac in place.     Hospital Course:  Interval History: With increased pain overnight most likely related to recent ERCP. Cont with BS antibxs for now. PBS consulted for ERCP  attempted on 12/4 but unable to cannulate. PBS to attempt again tomorrow. T bili with significant increase this am. Plan for paracentesis today. Wound vac in place and recently changed on 12/3. Monitor.     Scheduled Meds:   atovaquone  1,500 mg Oral Daily    ceFEPime (MAXIPIME) IVPB  1 g Intravenous Q12H    cycloSPORINE modified (NEORAL)  100 mg Oral BID    docusate sodium  100 mg Oral TID    ergocalciferol  50,000 Units Oral Q7 Days    furosemide  120 mg Intravenous BID    heparin (porcine)  5,000 Units Subcutaneous Q8H    levETIRAcetam  500 mg Oral BID    lorazepam  1 mg Intravenous Once    multivitamin  1 tablet Oral Daily    pantoprazole  40 mg Oral Daily    polyethylene glycol  17 g Oral Daily    sodium bicarbonate  1,300 mg Oral BID    thiamine  100 mg Oral Daily    valGANciclovir  450 mg Oral Every Mon, Wed, Fri    vancomycin (VANCOCIN) IVPB  1,000 mg Intravenous Q24H     Continuous Infusions:   PRN Meds:sodium chloride, sodium chloride, [START ON 12/6/2017] sodium chloride, acetaminophen, bisacodyl, ondansetron, ondansetron, oxyCODONE, oxyCODONE, ramelteon, sodium chloride 0.9%    Review of Systems   Constitutional: Positive for activity change, fatigue and fever. Negative for chills.   Respiratory: Negative for cough, shortness of breath and wheezing.    Cardiovascular: Positive for leg swelling.   Gastrointestinal: Positive for abdominal distention, abdominal pain and constipation. Negative for nausea and vomiting.   Genitourinary: Negative for difficulty urinating and dysuria.   Skin: Positive for wound.   Allergic/Immunologic: Positive for immunocompromised state.   Neurological: Positive for weakness.   Psychiatric/Behavioral: Negative for confusion and decreased concentration. The patient is nervous/anxious.      Objective:     Vital Signs (Most Recent):  Temp: 98.8 °F (37.1 °C) (12/05/17 1239)  Pulse: 100 (12/05/17 1239)  Resp: 18 (12/05/17 1239)  BP: 108/65 (12/05/17 1239)  SpO2: 99  % (12/05/17 1239) Vital Signs (24h Range):  Temp:  [98.1 °F (36.7 °C)-99.8 °F (37.7 °C)] 98.8 °F (37.1 °C)  Pulse:  [] 100  Resp:  [18] 18  SpO2:  [98 %-100 %] 99 %  BP: (102-115)/(56-68) 108/65     Weight: 81.1 kg (178 lb 14.4 oz)  Body mass index is 28.88 kg/m².    Intake/Output - Last 3 Shifts       12/03 0700 - 12/04 0659 12/04 0700 - 12/05 0659 12/05 0700 - 12/06 0659    P.O. 90 740     I.V. (mL/kg)  2500 (30.8)     IV Piggyback 350 350     Total Intake(mL/kg) 440 (5.4) 3590 (44.3)     Urine (mL/kg/hr) 730 (0.4) 495 (0.3)     Emesis/NG output  0 (0)     Other  0 (0)     Stool  0 (0)     Blood  0 (0)     Total Output 730 495      Net -290 +3095             Urine Occurrence  0 x     Stool Occurrence  0 x     Emesis Occurrence  0 x           Physical Exam   Constitutional: He is oriented to person, place, and time. He appears well-developed and well-nourished. No distress.   HENT:   Head: Normocephalic and atraumatic.   Neck: Normal range of motion. Neck supple. No JVD present.   Cardiovascular: Regular rhythm and normal heart sounds.  Tachycardia present.    No murmur heard.  Pulmonary/Chest: Effort normal. No respiratory distress. He has decreased breath sounds in the right middle field, the right lower field and the left lower field. He has no wheezes. He exhibits no tenderness.   Abdominal: Soft. Bowel sounds are normal. He exhibits distension. There is tenderness (diffusely ). There is no rebound and no guarding.   Wound vac to incision   Musculoskeletal: Normal range of motion. He exhibits edema (2+ LE edema). He exhibits no tenderness.   Neurological: He is alert and oriented to person, place, and time. He has normal reflexes.   Skin: Skin is warm and dry. He is not diaphoretic.   Psychiatric: He has a normal mood and affect. His behavior is normal. Judgment and thought content normal.   Nursing note and vitals reviewed.      Laboratory:  Immunosuppressants         Stop Route Frequency      cycloSPORINE modified (NEORAL) capsule 100 mg      -- Oral 2 times daily        CBC:   Recent Labs  Lab 12/05/17  0559   WBC 6.84   RBC 2.39*   HGB 7.4*   HCT 22.0*      MCV 92   MCH 31.0   MCHC 33.6     CMP:   Recent Labs  Lab 12/05/17  0559   GLU 79   CALCIUM 7.9*   ALBUMIN 2.0*   PROT 4.3*   *   K 3.6   CO2 18*   CL 97   BUN 49*   CREATININE 4.7*   ALKPHOS 104   ALT <5*   AST 13   BILITOT 6.2*     Coagulation:   Recent Labs  Lab 12/05/17  0559   INR 1.7*     Cardiac Markers: No results for input(s): CKMB, TROPONINT, MYOGLOBIN in the last 168 hours.  Labs within the past 24 hours have been reviewed.    Diagnostic Results:  I have personally reviewed all pertinent imaging studies.    Assessment/Plan:     * Fever    - Has now remained afebrile.   - Cont with BS antibxs for treatment.   - ERCP unable to be performed 12/4. PBS to attempt again on 12/6.  - wound vac changed on 12/2.               Hyperbilirubinemia    - Elevated t bili post liver txp in setting of abdominal pain, decreased appetitive and intermittent nausea.  PBS consulted to assess duct per ERCP.  - ERCP performed today but PBS unable to cannulate. Will plan for MRCP today to assess for stricture and if so PBS to plan for repeat ERCP in 48 hrs.   - NPO past MN.         Liver transplanted    - A/w fevers and hyperbilirubinemia.   - Tbili with increase again this am. CT abdomen reviewed.  - LFTs stable.   - PBS consulted. ERCP attempted on 12/4 but unable to cannulate.   - PBS to attempt ERCP again tomorrow as MRCP on 12/4 noted with stricture.   - Para planned for today.           LAURA (acute kidney injury)    - Cr level remains elevated. No need for HD at this time.   - Last HD 11/17. Now with moderate UOP.   - IV Lasix started per nephrology recs.     - Cont strict I/O's.           Seizure    - Cont keppra          Prophylactic immunotherapy    - See long term immunosuppressant          Long-term use of immunosuppressant medication     Cont cyclo. Monitor for toxicities and adjust dose accordingly          Peritonitis    - Diagnosed on previous admission.  Last para 11/29 with improving cell counts WBC 1400 with 45% PNMs   Was d/c'ed on augmentin, will switch to vanc/cef (see leukocytosis)   - Repeat Paracentesis today to reassess.             Leukocytosis    - Now improved.   - WBC elevated to 12.7 from 6.5 on admit. Subjective fever 100.5 at home. Recent dx peritonitis on augmentin outpt, now placed on BS antibxs empirically.   - Infectious mosley initiated  - CT abdomen reviewed.    - Plan for repeat paracentesis today to evaluate fluid for peritonitis again.         Anemia of chronic disease    - H&H low. Plan to transfuse one unit of PRBCs for replacement today.   - Monitor CBC daily.           Delayed surgical wound healing    - Wound vac in place, last changed 12/2.  - wound care consulted for wound vac change.               VTE Risk Mitigation         Ordered     heparin (porcine) injection 5,000 Units  Every 8 hours     Route:  Subcutaneous        12/04/17 0933     Medium Risk of VTE  Once      12/03/17 0249     Place sequential compression device  Until discontinued      12/03/17 0249          The patients clinical status was discussed at multidisplinary rounds, involving transplant surgery, transplant medicine, pharmacy, nursing, nutrition, and social work    Discharge Planning:  Not a candidate for d/c at this time.     Jin Perez, NP  Liver Transplant  [facility]

## 2017-12-05 NOTE — PROGRESS NOTES
Mri tech called this Rn stating that pt refusing to have further imaging done since he is not able to get pain med in addition to anxiety med. Tech stated transport will escort pt back to room. In the meantime tech stated a new order will have to be placed for Mri if pt agrees to do it. Charge Rn Carley returned to floor confirming the above and that ativan was not given to pt since pt declined further imaging.

## 2017-12-05 NOTE — PROCEDURES
Radiology Post-Procedure Note    Pre Op Diagnosis: Ascites    Post Op Diagnosis: Same    Procedure: US guided paracentesis.    Procedure performed by: Josh Rose MD    Written Informed Consent Obtained: Yes  Specimen Removed: YES clear yellow ascites  Estimated Blood Loss: Minimal    Findings:   Successful RLQ paracentesis.  Albumin administered per protocol. No complications.    Patient tolerated procedure well.    Josh Rose M.D.  Diagnostic and Interventional Radiologist  Department of Radiology  Pager: [phone]

## 2017-12-05 NOTE — ASSESSMENT & PLAN NOTE
- Now improved.   - WBC elevated to 12.7 from 6.5 on admit. Subjective fever 100.5 at home. Recent dx peritonitis on augmentin outpt, now placed on BS antibxs empirically.   - Infectious mosley initiated  - CT abdomen reviewed.    - Plan for repeat paracentesis today to evaluate fluid for peritonitis again.

## 2017-12-05 NOTE — PROGRESS NOTES
MRI called stating pt unable to complete imaging d/t claustrophobia. Tech asked if we could admin anti anxiety med so pt can finish imaging. This Rn called Dr Landa explaining the above mentioned. Md ordering Ativan. Will await pharm verification.

## 2017-12-05 NOTE — ASSESSMENT & PLAN NOTE
- Diagnosed on previous admission.  Last para 11/29 with improving cell counts WBC 1400 with 45% PNMs   Was d/c'ed on augmentin, will switch to vanc/cef (see leukocytosis)   - Repeat Paracentesis today to reassess.

## 2017-12-05 NOTE — PROGRESS NOTES
Paracentesis complete, 3000 MLs removed. Specimen sent to lab. . Dressing applied to RLQ puncture site, dressing clean dry and intact. Report called to inpatient nurse, MILAN Gamble RN. Patient put in for transport via stretcher to return to inpatient room.

## 2017-12-05 NOTE — PROGRESS NOTES
[facility]  Nephrology  Progress Note    Patient Name: Jhonny Diana  MRN: [id number]  Admission Date: 12/3/2017  Hospital Length of Stay: 2 days  Attending Provider: Solis Chapa MD   Primary Care Physician: Primary Doctor No  Principal Problem:Fever    Subjective:     HPI: Mr. Diana is a 26yo man w/a history of alcoholic cirrhosis s/p DDLT 10/19/2017; c/b seizures (swtiched off prograf to cyclo), ATN requiring HD (last HD 11/17), superificial wound infection s/p wound vac to chevron incision, and recent admit for fevers on 11/21 (discharged on empiric augmentin for suspected superficial wound infection) and readmitted 11/24 again with fever. Found to have peritonitis (WBC 5000, 75% PNM) neg for bile leak. He was treated initially with vanc/cefepime. Repeat cell counts 11/29 with some improvement (WBC 1400, 45% PNM). Pt transitioned to augmentin upon d/c 11/30.      Pt presented to the ED this morning with subjective fever (100.5) and general malaise beginning at 8pm yesterday. His abdomen is soft, but diffusely tender. Labs reveal elevated WBC count to 12.7 from 6.5 in addition to elevated tbili 3.5 from 2.3 when compared to labs 3 days ago, despite being on augmentin for recent diagnosis of peritonitis. He denies nausea, vomiting, diarrhea, congestion, cough, CP or SOB. He has received 1 L bolus NS in the ED.    Interval History: NAEON Afebrile. HE endorses not being able to drink much secondary to Procedures and NPO status. He received albumin yesterday.  sCr increased to 4.7 mg/dl. UO decreased 495 ml overnight. CO2 18 this am.    Review of patient's allergies indicates:   Allergen Reactions    Bactrim [sulfamethoxazole-trimethoprim] Other (See Comments)     Mookie Trell Syndrome     Current Facility-Administered Medications   Medication Frequency    0.9%  NaCl infusion (for blood administration) Q24H PRN    0.9%  NaCl infusion (for blood administration) Q24H PRN    [START ON 12/6/2017]  0.9%  NaCl infusion (for blood administration) Q24H PRN    acetaminophen tablet 650 mg Q8H PRN    atovaquone suspension 1,500 mg Daily    bisacodyl suppository 10 mg Daily PRN    cefepime in dextrose 5 % 1 gram/50 mL IVPB 1 g Q12H    cycloSPORINE modified (NEORAL) capsule 100 mg BID    docusate sodium capsule 100 mg TID    ergocalciferol capsule 50,000 Units Q7 Days    furosemide injection 120 mg BID    heparin (porcine) injection 5,000 Units Q8H    levETIRAcetam tablet 500 mg BID    lorazepam injection 1 mg Once    multivitamin tablet 1 tablet Daily    ondansetron disintegrating tablet 8 mg Q8H PRN    ondansetron injection 4 mg Q8H PRN    oxyCODONE immediate release tablet 10 mg Q4H PRN    oxyCODONE immediate release tablet 5 mg Q4H PRN    pantoprazole EC tablet 40 mg Daily    polyethylene glycol packet 17 g Daily    ramelteon tablet 8 mg Nightly PRN    sodium bicarbonate tablet 1,300 mg BID    sodium chloride 0.9% flush 3 mL PRN    thiamine tablet 100 mg Daily    valGANciclovir tablet 450 mg Every Mon, Wed, Fri    vancomycin 1 g in dextrose 5 % 250 mL IVPB (ready to mix system) Q24H       Objective:     Vital Signs (Most Recent):  Temp: 98.9 °F (37.2 °C) (12/05/17 1519)  Pulse: 100 (12/05/17 1539)  Resp: 18 (12/05/17 1519)  BP: 110/67 (12/05/17 1519)  SpO2: 98 % (12/05/17 1519)  O2 Device (Oxygen Therapy): room air (12/05/17 1519) Vital Signs (24h Range):  Temp:  [98.1 °F (36.7 °C)-99.8 °F (37.7 °C)] 98.9 °F (37.2 °C)  Pulse:  [] 100  Resp:  [18] 18  SpO2:  [98 %-100 %] 98 %  BP: (103-115)/(56-68) 110/67     Weight: 81.1 kg (178 lb 14.4 oz) (12/04/17 0515)  Body mass index is 28.88 kg/m².  Body surface area is 1.94 meters squared.    I/O last 3 completed shifts:  In: 4030 [P.O.:830; I.V.:2500; IV Piggyback:700]  Out: 870 [Urine:870]    Physical Exam   Constitutional: He is oriented to person, place, and time. He appears ill. No distress.   HENT:   Head: Normocephalic and atraumatic.    Eyes: Pupils are equal, round, and reactive to light. Scleral icterus is present.   Neck: Normal range of motion. Neck supple.   Cardiovascular: Regular rhythm.  Tachycardia present.  Exam reveals no gallop and no friction rub.    No murmur heard.  Pulmonary/Chest: Effort normal. He has decreased breath sounds in the right lower field and the left lower field. He has no wheezes. He has no rales.   Abdominal: Soft. He exhibits distension (positive asitis ). There is tenderness (2+ pitting edema in LE ). There is no guarding.   Wound vac to incision. Positive ascitis    Musculoskeletal: He exhibits edema.   Neurological: He is alert and oriented to person, place, and time.   Skin: He is not diaphoretic.   Psychiatric: He has a normal mood and affect. His behavior is normal. Thought content normal.   Nursing note and vitals reviewed.      Significant Labs:  ABGs: No results for input(s): PH, PCO2, HCO3, POCSATURATED, BE in the last 168 hours.  BMP:     Recent Labs  Lab 12/05/17  0559   GLU 79   CL 97   CO2 18*   BUN 49*   CREATININE 4.7*   CALCIUM 7.9*   MG 1.8     CBC:     Recent Labs  Lab 12/05/17  0559   WBC 6.84   RBC 2.39*   HGB 7.4*   HCT 22.0*      MCV 92   MCH 31.0   MCHC 33.6     CMP:     Recent Labs  Lab 12/05/17  0559   GLU 79   CALCIUM 7.9*   ALBUMIN 2.0*   PROT 4.3*   *   K 3.6   CO2 18*   CL 97   BUN 49*   CREATININE 4.7*   ALKPHOS 104   ALT <5*   AST 13   BILITOT 6.2*     All labs within the past 24 hours have been reviewed.     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 He was admitted for peritonitis from 11/24-11/30 for peritonitis.     Plan:   - LAURA on CKD 4 (baseline sCr 3.9 - 4.0). Likely instrinsic etiology, multifactorial secondary to sepsis vs. Toxins  - Increase sodium  biacarbonate 1,300 mg q TID for acidosis. Please.   - Need ABG. Ordered this am.  Discussed with primary team.  - Please start Lasix 120 mg BID  - Strict inatake and output, UO overnight since admission.  - Will have paracentesis and ERCP plan for today  - Please give albumin with paracenthesis  - Avoid nephrotoxic medication, renal dose medications and avoid contrast   - Renal diet  - No need for RRT            Thank you for your consult. I will follow-up with patient. Please contact us if you have any additional questions.    Krish Frausto MD  Nephrology  [facility]      ATTENDING PHYSICIAN ATTESTATION  I have personally interviewed and examined the patient. I thoroughly reviewed the demographic, clinical, laboratorial and imaging information available in medical records. I agree with the assessment and recommendations provided by the subspecialty resident. Dr. Frausto was under my supervision.

## 2017-12-05 NOTE — PROGRESS NOTES
Called pt's nurse about clamping and removing wound vac, nurse stated OK to clamp and remove then to place back to pt when MRI done

## 2017-12-05 NOTE — ASSESSMENT & PLAN NOTE
- Has now remained afebrile.   - Cont with BS antibxs for treatment.   - ERCP unable to be performed 12/4. PBS to attempt again on 12/6.  - wound vac changed on 12/2.

## 2017-12-05 NOTE — ANESTHESIA PREPROCEDURE EVALUATION
"[facility]  Anesthesia Pre-Operative Evaluation         Patient Name: Jhonny Diana  YOB: 1990  MRN: [id number]    SUBJECTIVE:     Pre-operative evaluation for Procedure(s) (LRB):  ERCP (N/A)     12/05/2017    Jhonny Diana is a 27 y.o. male w/ a significant PMHx of alcoholic cirrhosis s/p DDLT 10/19/2017; c/b seizures, LAURA on CKD , superificial wound infection and peritonitis, admitted with fevers and elevated bili, patient now presents for the above procedure(s).    Pt states that with previous anesthesia patient felt claustrophobic and as if he was "gasping for air".      LDA:   Rt IJ HD catheter  20 G lt hand  20 G Rt hand    Prev airway: Placement Date: 12/04/17; Placement Time: 1238; Method of Intubation: Direct laryngoscopy, Rapid Sequence Induction; Inserted by: CRNA; Airway Device: Endotracheal Tube; Mask Ventilation: Not Attempted; Intubated: Postinduction; Blade: Moore #2; Airway Device Size: 7.5; Style: Cuffed; Cuff Inflation: Minimal occlusive pressure; Inflation Amount: 6; Placement Verified By: Auscultation, Capnometry, ETT Condensation; Grade: Grade I; Complicating Factors: None; Intubation Findings: Positive EtCO2, Bilateral breath sounds, Atraumatic/Condition of teeth unchanged;  Depth of Insertion: 22; Securment: Lips; Complications: None; Breath Sounds: Equal Bilateral; Insertion Attempts: 1    Drips: None documented.       Patient Active Problem List   Diagnosis    Alcoholism /alcohol abuse    Acute renal failure with tubular necrosis    Severe malnutrition    Tachycardia    LAURA (acute kidney injury)    Liver transplanted    Prophylactic immunotherapy    Long-term use of immunosuppressant medication    At risk for opportunistic infections    Seizure    Fever    Delayed surgical wound healing    Anemia of chronic disease    Leukocytosis    Physical deconditioning    Peritonitis    Hyperbilirubinemia    Wound abscess       Review of patient's " allergies indicates:   Allergen Reactions    Bactrim [sulfamethoxazole-trimethoprim] Other (See Comments)     Mookie Trell Syndrome       Current Inpatient Medications:   atovaquone  1,500 mg Oral Daily    ceFEPime (MAXIPIME) IVPB  1 g Intravenous Q12H    cycloSPORINE modified (NEORAL)  100 mg Oral BID    docusate sodium  100 mg Oral TID    ergocalciferol  50,000 Units Oral Q7 Days    heparin (porcine)  5,000 Units Subcutaneous Q8H    levETIRAcetam  500 mg Oral BID    lorazepam  1 mg Intravenous Once    multivitamin  1 tablet Oral Daily    pantoprazole  40 mg Oral Daily    polyethylene glycol  17 g Oral Daily    sodium bicarbonate  1,300 mg Oral BID    thiamine  100 mg Oral Daily    valGANciclovir  450 mg Oral Every Mon, Wed, Fri    vancomycin (VANCOCIN) IVPB  1,000 mg Intravenous Q24H       No current facility-administered medications on file prior to encounter.      Current Outpatient Prescriptions on File Prior to Encounter   Medication Sig Dispense Refill    amoxicillin-clavulanate 500-125mg (AUGMENTIN) 500-125 mg Tab Take 1 tablet (500 mg total) by mouth 2 (two) times daily. 28 tablet 1    aspirin (ECOTRIN) 81 MG EC tablet Take 1 tablet (81 mg total) by mouth once daily. 30 tablet 5    atovaquone (MEPRON) 750 mg/5 mL Susp Take 10 mLs (1,500 mg total) by mouth once daily. Stop on 4/17/18 300 mL 5    cycloSPORINE modified, NEORAL, (NEORAL) 100 MG capsule Take 1 capsule (100 mg total) by mouth 2 (two) times daily. 60 capsule 11    docusate sodium (COLACE) 100 MG capsule Take 100 mg by mouth 3 (three) times daily as needed for Constipation.      ergocalciferol (ERGOCALCIFEROL) 50,000 unit Cap Take 1 capsule (50,000 Units total) by mouth every 7 days. 4 capsule 5    levETIRAcetam (KEPPRA) 500 MG Tab Take 1 tablet (500 mg total) by mouth 2 (two) times daily. 60 tablet 11    multivitamin (THERAGRAN) tablet Take 1 tablet by mouth once daily. 30 tablet 11    mycophenolate (CELLCEPT) 250 mg Cap  Take 2 capsules (500 mg total) by mouth 2 (two) times daily. 240 capsule 2    ondansetron (ZOFRAN-ODT) 8 MG TbDL Take 1 tablet (8 mg total) by mouth every 8 (eight) hours as needed (nausea). 30 tablet 0    oxyCODONE-acetaminophen (PERCOCET)  mg per tablet Take 0.5-1 tablets by mouth every 4 (four) hours as needed for Pain. 40 tablet 0    pantoprazole (PROTONIX) 40 MG tablet Take 1 tablet (40 mg total) by mouth once daily. 30 tablet 5    predniSONE (DELTASONE) 10 MG tablet Take 20mg PO QD 10/29-11/4; 15mg PO QD 11/5-11/11; 10mg PO QD 11/12-11/18; 5mg PO QD 11/19-11/25; 2.5mg PO QD 11/26-12/2; stop on 12/2/17 60 tablet 0    sodium bicarbonate 650 MG tablet Take 2 tablets (1,300 mg total) by mouth 2 (two) times daily. 120 tablet 2    thiamine 100 MG tablet Take 1 tablet (100 mg total) by mouth once daily. 30 tablet 6    valGANciclovir (VALCYTE) 450 mg Tab Take 1 tablet (450 mg total) by mouth every Mon, Wed, Fri. Stop on 1/17/18 12 tablet 2       Past Surgical History:   Procedure Laterality Date    APPENDECTOMY         Social History     Social History    Marital status: Single     Spouse name: N/A    Number of children: N/A    Years of education: N/A     Occupational History    Not on file.     Social History Main Topics    Smoking status: Former Smoker     Types: Cigarettes    Smokeless tobacco: Never Used    Alcohol use No      Comment: a fifth of liquor daily for years, cut back over last 2 months    Drug use: No    Sexual activity: Not on file     Other Topics Concern    Not on file     Social History Narrative    No narrative on file       OBJECTIVE:     Vital Signs Range (Last 24H):  Temp:  [36.7 °C (98.1 °F)-37.7 °C (99.8 °F)]   Pulse:  []   Resp:  [18]   BP: (102-115)/(56-68)   SpO2:  [98 %-100 %]       CBC:   Recent Labs      12/04/17   0536  12/05/17   0559   WBC  8.95  6.84   RBC  2.87*  2.39*   HGB  8.7*  7.4*   HCT  25.2*  22.0*   PLT  269  256   MCV  88  92   MCH  30.3   31.0   MCHC  34.5  33.6       CMP:   Recent Labs      17   0536  17   0559   NA  128*  129*   K  3.2*  3.6   CL  95  97   CO2  19*  18*   BUN  47*  49*   CREATININE  4.3*  4.7*   GLU  87  79   MG  1.9  1.8   PHOS  3.4  3.5   CALCIUM  7.4*  7.9*   ALBUMIN  1.6*  2.0*   PROT  4.0*  4.3*   ALKPHOS  126  104   ALT  5*  <5*   AST  14  13   BILITOT  3.8*  6.2*       INR:  Recent Labs      17   0410  17   0536  17   0559   INR  1.4*  1.7*  1.7*       Diagnostic Studies: No relevant studies.    EK/3/17  Vent. Rate : 124 BPM     Atrial Rate : 124 BPM     P-R Int : 156 ms          QRS Dur : 088 ms      QT Int : 318 ms       P-R-T Axes : 047 059 016 degrees     QTc Int : 456 ms    Sinus tachycardia  Nonspecific ST and/or T wave abnormalities  Abnormal ECG    2D ECHO 11/3/17    Results for orders placed or performed during the hospital encounter of 17   2D echo with color flow doppler   Result Value Ref Range    EF 55 55 - 65    Diastolic Dysfunction No     Aortic Valve Regurgitation TRIVIAL     Est. PA Systolic Pressure 32.81     Pericardial Effusion SMALL (A)     Mitral Valve Mobility NORMAL     Tricuspid Valve Regurgitation TRIVIAL TO MILD      CONCLUSIONS     1 - Normal left ventricular systolic function (EF 55-60%).     2 - Biatrial enlargement.     3 - Right ventricle is upper limit of normal in size with normal systolic function.     4 - The estimated PA systolic pressure is 33 mmHg.     5 - Trivial aortic regurgitation.     6 - Trivial to mild tricuspid regurgitation.     7 - Small pericardial effusion.       ASSESSMENT/PLAN:         Anesthesia Evaluation    I have reviewed the Patient Summary Reports.    I have reviewed the Nursing Notes.   I have reviewed the Medications.     Review of Systems  Anesthesia Hx:  No problems with previous Anesthesia  History of prior surgery of interest to airway management or planning: liver transplant. Previous anesthesia: General Denies  Family Hx of Anesthesia complications.   Denies Personal Hx of Anesthesia complications.   Cardiovascular:   Exercise tolerance: poor Hypertension    Renal/:   Chronic Renal Disease, ARF    Hepatic/GI:   Liver Disease, Hepatitis    Neurological:   Seizures, well controlled    Psych:   Psychiatric History          Physical Exam  General:  Well nourished    Airway/Jaw/Neck:  Airway Findings: Mouth Opening: Normal Tongue: Normal  General Airway Assessment: Adult  Mallampati: II  Improves to II with phonation.  TM Distance: Normal, at least 6 cm  Jaw/Neck Findings:  Neck ROM: Normal ROM      Dental:  Dental Findings: In tact   Chest/Lungs:  Chest/Lungs Findings: Clear to auscultation, Normal Respiratory Rate     Heart/Vascular:  Heart Findings: Rate: Normal  Rhythm: Regular Rhythm  Sounds: Normal        Mental Status:  Mental Status Findings:  Cooperative, Alert and Oriented         Anesthesia Plan  Type of Anesthesia, risks & benefits discussed:  Anesthesia Type:  general, MAC  Patient's Preference:   Intra-op Monitoring Plan: standard ASA monitors  Intra-op Monitoring Plan Comments:   Post Op Pain Control Plan: per primary service following discharge from PACU, multimodal analgesia and IV/PO Opioids PRN  Post Op Pain Control Plan Comments:   Induction:   IV  Beta Blocker:  Patient is not currently on a Beta-Blocker (No further documentation required).       Informed Consent: Patient understands risks and agrees with Anesthesia plan.  Questions answered. Anesthesia consent signed with patient.  ASA Score: 3     Day of Surgery Review of History & Physical:    H&P update referred to the provider.         Ready For Surgery From Anesthesia Perspective.

## 2017-12-05 NOTE — H&P
Inpatient Radiology Pre-procedure Note    History of Present Illness:  Jhonny Diana is a 27 y.o. male who presents for paracentesis.  Admission H&P reviewed.  Past Medical History:   Diagnosis Date    Alcoholic hepatitis with ascites     Alcoholic hepatitis with ascites     History of hematemesis 9/28/2017    Hypertension     Renal disorder      Past Surgical History:   Procedure Laterality Date    APPENDECTOMY         Review of Systems:   As documented in primary team H&P    Home Meds:   Prior to Admission medications    Medication Sig Start Date End Date Taking? Authorizing Provider   amoxicillin-clavulanate 500-125mg (AUGMENTIN) 500-125 mg Tab Take 1 tablet (500 mg total) by mouth 2 (two) times daily. 11/30/17   Solis Chapa MD   aspirin (ECOTRIN) 81 MG EC tablet Take 1 tablet (81 mg total) by mouth once daily. 11/8/17   Nathanael Medina MD   atovaquone (MEPRON) 750 mg/5 mL Susp Take 10 mLs (1,500 mg total) by mouth once daily. Stop on 4/17/18 10/19/17 4/17/18  Nathanael Medina MD   cycloSPORINE modified, NEORAL, (NEORAL) 100 MG capsule Take 1 capsule (100 mg total) by mouth 2 (two) times daily. 11/30/17   Solis Chapa MD   docusate sodium (COLACE) 100 MG capsule Take 100 mg by mouth 3 (three) times daily as needed for Constipation.    Historical Provider, MD   ergocalciferol (ERGOCALCIFEROL) 50,000 unit Cap Take 1 capsule (50,000 Units total) by mouth every 7 days. 11/7/17   Nathanael Medina MD   levETIRAcetam (KEPPRA) 500 MG Tab Take 1 tablet (500 mg total) by mouth 2 (two) times daily. 11/7/17 11/7/18  Nathanael Medina MD   multivitamin (THERAGRAN) tablet Take 1 tablet by mouth once daily. 11/7/17   Nathanael Medina MD   mycophenolate (CELLCEPT) 250 mg Cap Take 2 capsules (500 mg total) by mouth 2 (two) times daily. 11/21/17   Alin Lpóez Jr., MD   ondansetron (ZOFRAN-ODT) 8 MG TbDL Take 1 tablet (8 mg total) by mouth every 8 (eight) hours as needed (nausea). 11/9/17   Nathanael Medina MD   oxyCODONE-acetaminophen  (PERCOCET)  mg per tablet Take 0.5-1 tablets by mouth every 4 (four) hours as needed for Pain. 11/30/17   Geronimo Vera PA-C   pantoprazole (PROTONIX) 40 MG tablet Take 1 tablet (40 mg total) by mouth once daily. 11/8/17   Nathanael Medina MD   predniSONE (DELTASONE) 10 MG tablet Take 20mg PO QD 10/29-11/4; 15mg PO QD 11/5-11/11; 10mg PO QD 11/12-11/18; 5mg PO QD 11/19-11/25; 2.5mg PO QD 11/26-12/2; stop on 12/2/17 11/8/17   Nathanael Medina MD   sodium bicarbonate 650 MG tablet Take 2 tablets (1,300 mg total) by mouth 2 (two) times daily. 11/30/17   Geronimo Vera PA-C   thiamine 100 MG tablet Take 1 tablet (100 mg total) by mouth once daily. 11/7/17   Nathanael Medina MD   valGANciclovir (VALCYTE) 450 mg Tab Take 1 tablet (450 mg total) by mouth every Mon, Wed, Fri. Stop on 1/17/18 11/22/17   Alin López Jr., MD     Scheduled Meds:    atovaquone  1,500 mg Oral Daily    ceFEPime (MAXIPIME) IVPB  1 g Intravenous Q12H    cycloSPORINE modified (NEORAL)  100 mg Oral BID    docusate sodium  100 mg Oral TID    ergocalciferol  50,000 Units Oral Q7 Days    furosemide  120 mg Intravenous BID    heparin (porcine)  5,000 Units Subcutaneous Q8H    levETIRAcetam  500 mg Oral BID    lorazepam  1 mg Intravenous Once    multivitamin  1 tablet Oral Daily    pantoprazole  40 mg Oral Daily    polyethylene glycol  17 g Oral Daily    sodium bicarbonate  1,300 mg Oral BID    thiamine  100 mg Oral Daily    valGANciclovir  450 mg Oral Every Mon, Wed, Fri    vancomycin (VANCOCIN) IVPB  1,000 mg Intravenous Q24H     Continuous Infusions:    PRN Meds:sodium chloride, sodium chloride, [START ON 12/6/2017] sodium chloride, acetaminophen, bisacodyl, ondansetron, ondansetron, oxyCODONE, oxyCODONE, ramelteon, sodium chloride 0.9%  Anticoagulants/Antiplatelets: no anticoagulation    Allergies:   Review of patient's allergies indicates:   Allergen Reactions    Bactrim [sulfamethoxazole-trimethoprim] Other (See Comments)      Mookie Trell Syndrome     Sedation Hx: have not been any systemic reactions    Labs:    Recent Labs  Lab 12/05/17 0559   INR 1.7*       Recent Labs  Lab 12/05/17 0559   WBC 6.84   HGB 7.4*   HCT 22.0*   MCV 92         Recent Labs  Lab 12/05/17 0559   GLU 79   *   K 3.6   CL 97   CO2 18*   BUN 49*   CREATININE 4.7*   CALCIUM 7.9*   MG 1.8   ALT <5*   AST 13   ALBUMIN 2.0*   BILITOT 6.2*   BILIDIR 4.7*         Vitals:  Temp: 98.9 °F (37.2 °C) (12/05/17 1519)  Pulse: 86 (12/05/17 1706)  Resp: 18 (12/05/17 1706)  BP: 109/70 (12/05/17 1706)  SpO2: 100 % (12/05/17 1706)     Physical Exam:  ASA: 3  Mallampati: 2    General: no acute distress  Mental Status: alert and oriented to person, place and time  HEENT: normocephalic, atraumatic  Chest: unlabored breathing  Heart: regular heart rate  Abdomen: nondistended  Extremity: moves all extremities    Plan: Paracentesis  Sedation Plan: local only    Josh Rose M.D.  Diagnostic and Interventional Radiologist  Department of Radiology  Pager: [phone]

## 2017-12-05 NOTE — ASSESSMENT & PLAN NOTE
Nutrition Diagnosis:  Inadequate energy intake    Related to (etiology):   Poor appetite 2' ascites and abdominal distention, poor food choices    Signs and Symptoms (as evidenced by):   Pt consuming ~25-50% EEN and EPN    Interventions/Recommendations (treatment strategy):  See recs    Nutrition Diagnosis Status:   New

## 2017-12-05 NOTE — PLAN OF CARE
Problem: Patient Care Overview  Goal: Plan of Care Review  Recommendations    Recommendation/Intervention: Pt with poor intake since OLTx 10/2017 due to ascites and abdominal fullness.     Continue renal diet with Novasource TID. Encouraged patient to consume oral diet and Novasouce, despite food preferences.      If oral intake does not improve in next 48 hours and if medically able, recommend initating enteral nutrition. Novasource Renal at goal rate of 40mL/hr will provide 1920kcal, 87g protein, and 688mL free fluid. Flushes per MD.     If unable to initiate EN and oral intake remains negligible >48 hours, recommend TPN. Recommend 98g AA/355g dextrose, fluid and lytes per pharmacy.     RD following.     Goals: Adequate nutrition PO vs EN  Nutrition Goal Status: new  Communication of RD Recs: reviewed with physician

## 2017-12-06 ENCOUNTER — SURGERY (OUTPATIENT)
Age: 27
End: 2017-12-06

## 2017-12-06 ENCOUNTER — ANESTHESIA (OUTPATIENT)
Dept: ENDOSCOPY | Facility: HOSPITAL | Age: 27
DRG: 949 | End: 2017-12-06
Payer: COMMERCIAL

## 2017-12-06 LAB
ALBUMIN SERPL BCP-MCNC: 2.1 G/DL
ALP SERPL-CCNC: 135 U/L
ALT SERPL W/O P-5'-P-CCNC: 5 U/L
ANION GAP SERPL CALC-SCNC: 15 MMOL/L
ANISOCYTOSIS BLD QL SMEAR: SLIGHT
AST SERPL-CCNC: 17 U/L
BASOPHILS # BLD AUTO: ABNORMAL K/UL
BASOPHILS NFR BLD: 1 %
BILIRUB SERPL-MCNC: 9.1 MG/DL
BLD PROD TYP BPU: NORMAL
BLOOD UNIT EXPIRATION DATE: NORMAL
BLOOD UNIT TYPE CODE: 9500
BLOOD UNIT TYPE: NORMAL
BUN SERPL-MCNC: 56 MG/DL
CALCIUM SERPL-MCNC: 8.5 MG/DL
CHLORIDE SERPL-SCNC: 94 MMOL/L
CO2 SERPL-SCNC: 19 MMOL/L
CODING SYSTEM: NORMAL
CREAT SERPL-MCNC: 5.7 MG/DL
CYCLOSPORINE BLD LC/MS/MS-MCNC: 264 NG/ML
DIFFERENTIAL METHOD: ABNORMAL
DISPENSE STATUS: NORMAL
EOSINOPHIL # BLD AUTO: ABNORMAL K/UL
EOSINOPHIL NFR BLD: 0 %
ERYTHROCYTE [DISTWIDTH] IN BLOOD BY AUTOMATED COUNT: 17.4 %
EST. GFR  (AFRICAN AMERICAN): 14.4 ML/MIN/1.73 M^2
EST. GFR  (NON AFRICAN AMERICAN): 12.4 ML/MIN/1.73 M^2
GLUCOSE SERPL-MCNC: 72 MG/DL
HCT VFR BLD AUTO: 26.6 %
HGB BLD-MCNC: 9.1 G/DL
HYPOCHROMIA BLD QL SMEAR: ABNORMAL
IMM GRANULOCYTES # BLD AUTO: ABNORMAL K/UL
IMM GRANULOCYTES NFR BLD AUTO: ABNORMAL %
INR PPP: 1.3
LYMPHOCYTES # BLD AUTO: ABNORMAL K/UL
LYMPHOCYTES NFR BLD: 16 %
MAGNESIUM SERPL-MCNC: 1.9 MG/DL
MCH RBC QN AUTO: 30.2 PG
MCHC RBC AUTO-ENTMCNC: 34.2 G/DL
MCV RBC AUTO: 88 FL
MONOCYTES # BLD AUTO: ABNORMAL K/UL
MONOCYTES NFR BLD: 5 %
NEUTROPHILS NFR BLD: 77 %
NEUTS BAND NFR BLD MANUAL: 1 %
NRBC BLD-RTO: 0 /100 WBC
NUM UNITS TRANS FFP: NORMAL
OVALOCYTES BLD QL SMEAR: ABNORMAL
PHOSPHATE SERPL-MCNC: 3.8 MG/DL
PLATELET # BLD AUTO: 289 K/UL
PMV BLD AUTO: 10.5 FL
POIKILOCYTOSIS BLD QL SMEAR: SLIGHT
POLYCHROMASIA BLD QL SMEAR: ABNORMAL
POTASSIUM SERPL-SCNC: 3.7 MMOL/L
PROT SERPL-MCNC: 5.1 G/DL
PROTHROMBIN TIME: 13.5 SEC
RBC # BLD AUTO: 3.01 M/UL
SODIUM SERPL-SCNC: 128 MMOL/L
VANCOMYCIN TROUGH SERPL-MCNC: 46.1 UG/ML
WBC # BLD AUTO: 6.9 K/UL

## 2017-12-06 PROCEDURE — 27201674 HC SPHINCTERTOME: Performed by: INTERNAL MEDICINE

## 2017-12-06 PROCEDURE — 99233 SBSQ HOSP IP/OBS HIGH 50: CPT | Mod: ,,, | Performed by: INTERNAL MEDICINE

## 2017-12-06 PROCEDURE — 63600175 PHARM REV CODE 636 W HCPCS: Performed by: INTERNAL MEDICINE

## 2017-12-06 PROCEDURE — 25000003 PHARM REV CODE 250: Performed by: PHYSICIAN ASSISTANT

## 2017-12-06 PROCEDURE — 63600175 PHARM REV CODE 636 W HCPCS: Performed by: NURSE PRACTITIONER

## 2017-12-06 PROCEDURE — 84100 ASSAY OF PHOSPHORUS: CPT

## 2017-12-06 PROCEDURE — 63600175 PHARM REV CODE 636 W HCPCS: Performed by: NURSE ANESTHETIST, CERTIFIED REGISTERED

## 2017-12-06 PROCEDURE — D9220A PRA ANESTHESIA: Mod: ,,, | Performed by: ANESTHESIOLOGY

## 2017-12-06 PROCEDURE — 25000003 PHARM REV CODE 250: Performed by: NURSE PRACTITIONER

## 2017-12-06 PROCEDURE — 94761 N-INVAS EAR/PLS OXIMETRY MLT: CPT

## 2017-12-06 PROCEDURE — 80100014 HC HEMODIALYSIS 1:1

## 2017-12-06 PROCEDURE — 99233 SBSQ HOSP IP/OBS HIGH 50: CPT | Mod: 24,,, | Performed by: PHYSICIAN ASSISTANT

## 2017-12-06 PROCEDURE — 80053 COMPREHEN METABOLIC PANEL: CPT

## 2017-12-06 PROCEDURE — 85007 BL SMEAR W/DIFF WBC COUNT: CPT

## 2017-12-06 PROCEDURE — C2617 STENT, NON-COR, TEM W/O DEL: HCPCS | Performed by: INTERNAL MEDICINE

## 2017-12-06 PROCEDURE — 74328 X-RAY BILE DUCT ENDOSCOPY: CPT | Mod: 26,,, | Performed by: INTERNAL MEDICINE

## 2017-12-06 PROCEDURE — P9017 PLASMA 1 DONOR FRZ W/IN 8 HR: HCPCS

## 2017-12-06 PROCEDURE — 20600001 HC STEP DOWN PRIVATE ROOM

## 2017-12-06 PROCEDURE — 25000003 PHARM REV CODE 250: Performed by: NURSE ANESTHETIST, CERTIFIED REGISTERED

## 2017-12-06 PROCEDURE — 83735 ASSAY OF MAGNESIUM: CPT

## 2017-12-06 PROCEDURE — 43274 ERCP DUCT STENT PLACEMENT: CPT | Mod: ,,, | Performed by: INTERNAL MEDICINE

## 2017-12-06 PROCEDURE — 63600175 PHARM REV CODE 636 W HCPCS: Performed by: PHYSICIAN ASSISTANT

## 2017-12-06 PROCEDURE — 43274 ERCP DUCT STENT PLACEMENT: CPT | Performed by: INTERNAL MEDICINE

## 2017-12-06 PROCEDURE — 80202 ASSAY OF VANCOMYCIN: CPT

## 2017-12-06 PROCEDURE — 36430 TRANSFUSION BLD/BLD COMPNT: CPT

## 2017-12-06 PROCEDURE — 80158 DRUG ASSAY CYCLOSPORINE: CPT

## 2017-12-06 PROCEDURE — 0F798DZ DILATION OF COMMON BILE DUCT WITH INTRALUMINAL DEVICE, VIA NATURAL OR ARTIFICIAL OPENING ENDOSCOPIC: ICD-10-PCS | Performed by: INTERNAL MEDICINE

## 2017-12-06 PROCEDURE — 97608 NEG PRS WND THER NDME>50SQCM: CPT

## 2017-12-06 PROCEDURE — 74328 X-RAY BILE DUCT ENDOSCOPY: CPT | Performed by: INTERNAL MEDICINE

## 2017-12-06 PROCEDURE — 37000009 HC ANESTHESIA EA ADD 15 MINS: Performed by: INTERNAL MEDICINE

## 2017-12-06 PROCEDURE — 85610 PROTHROMBIN TIME: CPT

## 2017-12-06 PROCEDURE — 27202127 HC STENT INTRODUCER: Performed by: INTERNAL MEDICINE

## 2017-12-06 PROCEDURE — C1769 GUIDE WIRE: HCPCS | Performed by: INTERNAL MEDICINE

## 2017-12-06 PROCEDURE — 37000008 HC ANESTHESIA 1ST 15 MINUTES: Performed by: INTERNAL MEDICINE

## 2017-12-06 PROCEDURE — 36415 COLL VENOUS BLD VENIPUNCTURE: CPT

## 2017-12-06 PROCEDURE — 85027 COMPLETE CBC AUTOMATED: CPT

## 2017-12-06 DEVICE — IMPLANTABLE DEVICE
Type: IMPLANTABLE DEVICE | Site: BILE DUCT | Status: NON-FUNCTIONAL
Removed: 2018-01-30

## 2017-12-06 RX ORDER — SUCCINYLCHOLINE CHLORIDE 20 MG/ML
INJECTION INTRAMUSCULAR; INTRAVENOUS
Status: DISCONTINUED | OUTPATIENT
Start: 2017-12-06 | End: 2017-12-06

## 2017-12-06 RX ORDER — HYDROMORPHONE HYDROCHLORIDE 1 MG/ML
0.2 INJECTION, SOLUTION INTRAMUSCULAR; INTRAVENOUS; SUBCUTANEOUS EVERY 5 MIN PRN
Status: DISCONTINUED | OUTPATIENT
Start: 2017-12-06 | End: 2017-12-06 | Stop reason: HOSPADM

## 2017-12-06 RX ORDER — SODIUM CHLORIDE 9 MG/ML
INJECTION, SOLUTION INTRAVENOUS
Status: DISCONTINUED | OUTPATIENT
Start: 2017-12-06 | End: 2017-12-09

## 2017-12-06 RX ORDER — SODIUM CHLORIDE 9 MG/ML
INJECTION, SOLUTION INTRAVENOUS ONCE
Status: DISCONTINUED | OUTPATIENT
Start: 2017-12-06 | End: 2017-12-08

## 2017-12-06 RX ORDER — ALBUMIN HUMAN 250 G/1000ML
25 SOLUTION INTRAVENOUS
Status: DISCONTINUED | OUTPATIENT
Start: 2017-12-06 | End: 2017-12-20 | Stop reason: HOSPADM

## 2017-12-06 RX ORDER — MIDODRINE HYDROCHLORIDE 5 MG/1
5 TABLET ORAL
Status: DISCONTINUED | OUTPATIENT
Start: 2017-12-06 | End: 2017-12-20 | Stop reason: HOSPADM

## 2017-12-06 RX ORDER — CEFEPIME HYDROCHLORIDE 1 G/50ML
1 INJECTION, SOLUTION INTRAVENOUS
Status: DISCONTINUED | OUTPATIENT
Start: 2017-12-06 | End: 2017-12-09

## 2017-12-06 RX ORDER — SODIUM CHLORIDE 9 MG/ML
INJECTION, SOLUTION INTRAVENOUS CONTINUOUS PRN
Status: DISCONTINUED | OUTPATIENT
Start: 2017-12-06 | End: 2017-12-06

## 2017-12-06 RX ORDER — ROCURONIUM BROMIDE 10 MG/ML
INJECTION, SOLUTION INTRAVENOUS
Status: DISCONTINUED | OUTPATIENT
Start: 2017-12-06 | End: 2017-12-06

## 2017-12-06 RX ORDER — SODIUM CHLORIDE 0.9 % (FLUSH) 0.9 %
3 SYRINGE (ML) INJECTION
Status: DISCONTINUED | OUTPATIENT
Start: 2017-12-06 | End: 2017-12-20 | Stop reason: HOSPADM

## 2017-12-06 RX ORDER — FENTANYL CITRATE 50 UG/ML
INJECTION, SOLUTION INTRAMUSCULAR; INTRAVENOUS
Status: DISCONTINUED | OUTPATIENT
Start: 2017-12-06 | End: 2017-12-06

## 2017-12-06 RX ORDER — LIDOCAINE HCL/PF 100 MG/5ML
SYRINGE (ML) INTRAVENOUS
Status: DISCONTINUED | OUTPATIENT
Start: 2017-12-06 | End: 2017-12-06

## 2017-12-06 RX ORDER — PROPOFOL 10 MG/ML
VIAL (ML) INTRAVENOUS
Status: DISCONTINUED | OUTPATIENT
Start: 2017-12-06 | End: 2017-12-06

## 2017-12-06 RX ORDER — ONDANSETRON 2 MG/ML
INJECTION INTRAMUSCULAR; INTRAVENOUS
Status: DISCONTINUED | OUTPATIENT
Start: 2017-12-06 | End: 2017-12-06

## 2017-12-06 RX ORDER — MEPERIDINE HYDROCHLORIDE 50 MG/ML
12.5 INJECTION INTRAMUSCULAR; INTRAVENOUS; SUBCUTANEOUS EVERY 10 MIN PRN
Status: DISCONTINUED | OUTPATIENT
Start: 2017-12-06 | End: 2017-12-06 | Stop reason: HOSPADM

## 2017-12-06 RX ORDER — SODIUM BICARBONATE 650 MG/1
1300 TABLET ORAL 3 TIMES DAILY
Status: DISCONTINUED | OUTPATIENT
Start: 2017-12-06 | End: 2017-12-11

## 2017-12-06 RX ORDER — CIPROFLOXACIN 2 MG/ML
400 INJECTION, SOLUTION INTRAVENOUS ONCE
Status: COMPLETED | OUTPATIENT
Start: 2017-12-06 | End: 2017-12-06

## 2017-12-06 RX ADMIN — ATOVAQUONE 1500 MG: 750 SUSPENSION ORAL at 01:12

## 2017-12-06 RX ADMIN — Medication 100 MG: at 01:12

## 2017-12-06 RX ADMIN — FENTANYL CITRATE 50 MCG: 50 INJECTION, SOLUTION INTRAMUSCULAR; INTRAVENOUS at 11:12

## 2017-12-06 RX ADMIN — HEPARIN SODIUM 5000 UNITS: 5000 INJECTION, SOLUTION INTRAVENOUS; SUBCUTANEOUS at 01:12

## 2017-12-06 RX ADMIN — DOCUSATE SODIUM 100 MG: 100 CAPSULE, LIQUID FILLED ORAL at 06:12

## 2017-12-06 RX ADMIN — PANTOPRAZOLE SODIUM 40 MG: 40 TABLET, DELAYED RELEASE ORAL at 01:12

## 2017-12-06 RX ADMIN — OXYCODONE HYDROCHLORIDE 10 MG: 5 TABLET ORAL at 11:12

## 2017-12-06 RX ADMIN — SODIUM BICARBONATE 650 MG TABLET 1300 MG: at 01:12

## 2017-12-06 RX ADMIN — SUCCINYLCHOLINE CHLORIDE 140 MG: 20 INJECTION, SOLUTION INTRAMUSCULAR; INTRAVENOUS at 11:12

## 2017-12-06 RX ADMIN — ONDANSETRON 8 MG: 8 TABLET, ORALLY DISINTEGRATING ORAL at 11:12

## 2017-12-06 RX ADMIN — CYCLOSPORINE 100 MG: 100 CAPSULE, LIQUID FILLED ORAL at 05:12

## 2017-12-06 RX ADMIN — OXYCODONE HYDROCHLORIDE 10 MG: 5 TABLET ORAL at 01:12

## 2017-12-06 RX ADMIN — ONDANSETRON 4 MG: 2 INJECTION INTRAMUSCULAR; INTRAVENOUS at 06:12

## 2017-12-06 RX ADMIN — OXYCODONE HYDROCHLORIDE 10 MG: 5 TABLET ORAL at 06:12

## 2017-12-06 RX ADMIN — DOCUSATE SODIUM 100 MG: 100 CAPSULE, LIQUID FILLED ORAL at 11:12

## 2017-12-06 RX ADMIN — SODIUM BICARBONATE 650 MG TABLET 1300 MG: at 11:12

## 2017-12-06 RX ADMIN — ROCURONIUM BROMIDE 5 MG: 10 INJECTION, SOLUTION INTRAVENOUS at 11:12

## 2017-12-06 RX ADMIN — PROPOFOL 20 MG: 10 INJECTION, EMULSION INTRAVENOUS at 11:12

## 2017-12-06 RX ADMIN — ONDANSETRON 8 MG: 8 TABLET, ORALLY DISINTEGRATING ORAL at 01:12

## 2017-12-06 RX ADMIN — LEVETIRACETAM 500 MG: 500 TABLET, FILM COATED ORAL at 01:12

## 2017-12-06 RX ADMIN — LIDOCAINE HYDROCHLORIDE 50 MG: 20 INJECTION, SOLUTION INTRAVENOUS at 11:12

## 2017-12-06 RX ADMIN — POLYETHYLENE GLYCOL 3350 17 G: 17 POWDER, FOR SOLUTION ORAL at 08:12

## 2017-12-06 RX ADMIN — HEPARIN SODIUM 5000 UNITS: 5000 INJECTION, SOLUTION INTRAVENOUS; SUBCUTANEOUS at 11:12

## 2017-12-06 RX ADMIN — CIPROFLOXACIN 400 MG: 2 INJECTION, SOLUTION INTRAVENOUS at 11:12

## 2017-12-06 RX ADMIN — DOCUSATE SODIUM 100 MG: 100 CAPSULE, LIQUID FILLED ORAL at 01:12

## 2017-12-06 RX ADMIN — PROPOFOL 150 MG: 10 INJECTION, EMULSION INTRAVENOUS at 11:12

## 2017-12-06 RX ADMIN — ONDANSETRON 4 MG: 2 INJECTION INTRAMUSCULAR; INTRAVENOUS at 11:12

## 2017-12-06 RX ADMIN — CEFEPIME HYDROCHLORIDE 1 G: 1 INJECTION, SOLUTION INTRAVENOUS at 11:12

## 2017-12-06 RX ADMIN — VALGANCICLOVIR 450 MG: 450 TABLET, FILM COATED ORAL at 05:12

## 2017-12-06 RX ADMIN — CYCLOSPORINE 100 MG: 100 CAPSULE, LIQUID FILLED ORAL at 01:12

## 2017-12-06 RX ADMIN — THERA TABS 1 TABLET: TAB at 01:12

## 2017-12-06 RX ADMIN — SODIUM CHLORIDE: 0.9 INJECTION, SOLUTION INTRAVENOUS at 10:12

## 2017-12-06 RX ADMIN — LEVETIRACETAM 500 MG: 500 TABLET, FILM COATED ORAL at 11:12

## 2017-12-06 NOTE — PATIENT INSTRUCTIONS
Discharge Summary/Instructions after an Endoscopic Procedure  Patient Name: Jhonny Diana  Patient MRN: [id number]  Patient YOB: 1990 Wednesday, December 06, 2017  Dionicio Hickman MD  RESTRICTIONS:  During your procedure today, you received medications for sedation.  These   medications may affect your judgment, balance and coordination.  Therefore,   for 24 hours, you have the following restrictions:   - DO NOT drive a car, operate machinery, make legal/financial decisions,   sign important papers or drink alcohol.    ACTIVITY:  The following day: return to full activity including work, except no heavy   lifting, straining or running for 3 days if polyps were removed.  DIET:  Eat and drink normally unless instructed otherwise.     TREATMENT FOR COMMON SIDE EFFECTS:  - Mild abdominal pain, belching, bloating or excessive gas: rest, eat   lightly and use a heating pad.  - Sore Throat: treat with throat lozenges and/or gargle with warm salt   water.  SYMPTOMS TO WATCH FOR AND REPORT TO YOUR PHYSICIAN:  1. Abdominal pain or bloating, other than gas cramps.  2. Chest pain.  3. Back pain.  4. Chills or fever occurring within 24 hours after the procedure.  5. Rectal bleeding, which would show as bright red, maroon, or black stools.   (A tablespoon of blood from the rectum is not serious, especially if   hemorrhoids are present.)  6. Vomiting.  7. Weakness or dizziness.  8. Because air was used during the procedure, expelling large amounts of air   from your rectum or belching is normal.  9. If a bowel prep was taken, you may not have a bowel movement for 1-3   days.  This is normal.  GO DIRECTLY TO THE NEAREST EMERGENCY ROOM IF YOU HAVE ANY OF THE FOLLOWING:      Difficulty breathing  Chills and/or fever over 101 F   Persistent vomiting and/or vomiting blood   Severe abdominal pain   Severe chest pain   Black, tarry stools   Bleeding- more than one tablespoon   Any other symptom or condition that you may feel  needs urgent attention  Your doctor recommends these additional instructions:  If any biopsies were taken, your doctor s clinic will contact you in 1 to 2   weeks with any results.  Watch for symptoms of pancreatitis, bleeding, perforation and cholangitis.   Your physician has recommended a repeat ERCP in 10 weeks to remove stent.  For questions, problems or results please call your physician - Dionicio Hickman MD at Work:  [phone].  [facility], EMERGENCY ROOM PHONE NUMBER: [phone]  IF A COMPLICATION OR EMERGENCY SITUATION ARISES AND YOU ARE UNABLE TO REACH   YOUR PHYSICIAN - GO DIRECTLY TO THE EMERGENCY ROOM.  Dionicio Hickman MD  12/6/2017 12:01:47 PM  This report has been verified and signed electronically.

## 2017-12-06 NOTE — PLAN OF CARE
Pt is AAOx4; afebrile; vital signs stable. He went for an ERCP and stent placement today and is recovering well. Wound vac changed by wound care today. He complains of lower midline abd pain, which was mildly relieved by oxycodone 10mg. Post procedure nausea treated with po and iv zofran. Mother is at bedside, attentive to pt. He is up with standby assist and a walker.

## 2017-12-06 NOTE — ASSESSMENT & PLAN NOTE
- Now improved.   - WBC elevated to 12.7 from 6.5 on admit. Subjective fever 100.5 at home. Recent dx peritonitis on augmentin outpt, now placed on BS antibxs empirically.   - Infectious mosley initiated  - CT abdomen reviewed.    -Para 12/5/17,  improving from ~1500.

## 2017-12-06 NOTE — ASSESSMENT & PLAN NOTE
- Elevated t bili post liver txp in setting of abdominal pain, decreased appetitive and intermittent nausea.  PBS consulted to assess duct per ERCP.  - ERCP performed today but PBS unable to cannulate. MRCP with stricture.  - Tbili continues to rise today from 6.2 to 9.1.   - ERCP today with PBS.

## 2017-12-06 NOTE — PROGRESS NOTES
Update given to TSU RN at this time, will continue to monitor patient in PACU until appropriate for release back to room.

## 2017-12-06 NOTE — TRANSFER OF CARE
"Anesthesia Transfer of Care Note    Patient: Jhonny Diana    Procedure(s) Performed: Procedure(s) (LRB):  ERCP (N/A)    Patient location: PACU    Anesthesia Type: general    Transport from OR: Transported from OR on 6-10 L/min O2 by face mask with adequate spontaneous ventilation    Post pain: adequate analgesia    Post assessment: no apparent anesthetic complications    Post vital signs: stable    Level of consciousness: sedated    Nausea/Vomiting: no nausea/vomiting    Complications: none    Transfer of care protocol was followed      Last vitals:   Visit Vitals  /69 (BP Location: Left arm, Patient Position: Lying)   Pulse 96   Temp 37.4 °C (99.4 °F)   Resp 17   Ht 5' 6" (1.676 m)   Wt 81.1 kg (178 lb 14.4 oz)   SpO2 100%   BMI 28.88 kg/m²     "

## 2017-12-06 NOTE — ASSESSMENT & PLAN NOTE
- Diagnosed on previous admission.  Last para 11/29 with improving cell counts WBC 1400 with 45% PNMs   Was d/c'ed on augmentin, will switch to vanc/cef (see leukocytosis)   - Repeat Paracentesis 12/5/17, with  (improving from 1497)

## 2017-12-06 NOTE — NURSING TRANSFER
Nursing Transfer Note      12/6/2017     Transfer To: [id number]    Transfer via stretcher    Transfer with cardiac monitoring, wound vac    Transported by PCT    Medicines sent: none    Chart send with patient: Yes    Notified: family already in pt's  room    Patient reassessed at: 1243

## 2017-12-06 NOTE — SUBJECTIVE & OBJECTIVE
Interval History: NAEON Afebrile. Pleasantly disoriented today with positive asterixis. ERCP today since BB rising. S/p 3 lts paracentesis yesterday.  Transfused 1 units PRBC's with appropriate response. sCr increased to 5.7 from 4.7mg/dl. UO increased to 815 ml overnight. CO2 19 this am.    Review of patient's allergies indicates:   Allergen Reactions    Bactrim [sulfamethoxazole-trimethoprim] Other (See Comments)     Mookie Trell Syndrome     Current Facility-Administered Medications   Medication Frequency    0.9%  NaCl infusion PRN    0.9%  NaCl infusion Once    acetaminophen tablet 650 mg Q8H PRN    albumin human 25% bottle 25 g PRN    atovaquone suspension 1,500 mg Daily    bisacodyl suppository 10 mg Daily PRN    cefepime in dextrose 5 % 1 gram/50 mL IVPB 1 g Q24H    cycloSPORINE modified (NEORAL) capsule 100 mg BID    docusate sodium capsule 100 mg TID    ergocalciferol capsule 50,000 Units Q7 Days    heparin (porcine) injection 5,000 Units Q8H    levETIRAcetam tablet 500 mg BID    midodrine tablet 5 mg PRN    multivitamin tablet 1 tablet Daily    ondansetron disintegrating tablet 8 mg Q8H PRN    ondansetron injection 4 mg Q8H PRN    oxyCODONE immediate release tablet 10 mg Q4H PRN    oxyCODONE immediate release tablet 5 mg Q4H PRN    pantoprazole EC tablet 40 mg Daily    polyethylene glycol packet 17 g Daily    ramelteon tablet 8 mg Nightly PRN    sodium bicarbonate tablet 1,300 mg TID    sodium chloride 0.9% flush 3 mL PRN    sodium chloride 0.9% flush 3 mL PRN    thiamine tablet 100 mg Daily    valGANciclovir tablet 450 mg Every Mon, Wed, Fri       Objective:     Vital Signs (Most Recent):  Temp: 97.9 °F (36.6 °C) (12/06/17 1650)  Pulse: 88 (12/06/17 1650)  Resp: 18 (12/06/17 1650)  BP: 111/67 (12/06/17 1650)  SpO2: 100 % (12/06/17 1650)  O2 Device (Oxygen Therapy): room air (12/06/17 1650) Vital Signs (24h Range):  Temp:  [97.5 °F (36.4 °C)-99.4 °F (37.4 °C)] 97.9 °F (36.6  °C)  Pulse:  [] 88  Resp:  [13-20] 18  SpO2:  [99 %-100 %] 100 %  BP: ()/(5-69) 111/67     Weight: 81.1 kg (178 lb 14.4 oz) (12/04/17 0515)  Body mass index is 28.88 kg/m².  Body surface area is 1.94 meters squared.    I/O last 3 completed shifts:  In: 2970 [P.O.:1270; I.V.:1000; Blood:350; IV Piggyback:350]  Out: 985 [Urine:985]    Physical Exam   Constitutional: He has a sickly appearance. No distress. Nasal cannula in place.   Pleasantly disoriented with constant reassurance    HENT:   Head: Normocephalic and atraumatic.   Eyes: Pupils are equal, round, and reactive to light. Scleral icterus is present.   Neck: Normal range of motion. Neck supple. JVD present.   Cardiovascular: Regular rhythm.  Tachycardia present.  Exam reveals no gallop and no friction rub.    No murmur heard.  Pulmonary/Chest: Effort normal. He has decreased breath sounds in the right lower field and the left lower field. He has no wheezes. He has no rales.   Abdominal: Soft. He exhibits distension (positive asitis much improved post paracentesis). There is tenderness (2+ pitting edema in LE ). There is no guarding.   Wound vac to incision. Positive ascitis    Musculoskeletal: He exhibits edema.   Neurological:   Positive Asterixis, somnolent and nauseated   Skin: Skin is warm and dry. Capillary refill takes less than 2 seconds. He is not diaphoretic.   Psychiatric: He has a normal mood and affect. His behavior is normal.   Nursing note and vitals reviewed.      Significant Labs:  ABGs: No results for input(s): PH, PCO2, HCO3, POCSATURATED, BE in the last 168 hours.  BMP:     Recent Labs  Lab 12/06/17  0634   GLU 72   CL 94*   CO2 19*   BUN 56*   CREATININE 5.7*   CALCIUM 8.5*   MG 1.9     CBC:     Recent Labs  Lab 12/06/17  0634   WBC 6.90   RBC 3.01*   HGB 9.1*   HCT 26.6*      MCV 88   MCH 30.2   MCHC 34.2     CMP:     Recent Labs  Lab 12/06/17  0634   GLU 72   CALCIUM 8.5*   ALBUMIN 2.1*   PROT 5.1*   *   K 3.7    CO2 19*   CL 94*   BUN 56*   CREATININE 5.7*   ALKPHOS 135   ALT 5*   AST 17   BILITOT 9.1*     All labs within the past 24 hours have been reviewed.     Significant Imaging:  Labs: Reviewed  X-Ray: Reviewed

## 2017-12-06 NOTE — ASSESSMENT & PLAN NOTE
- A/w fevers and hyperbilirubinemia.   - Tbili with increase again this am. CT abdomen reviewed.  - LFTs stable.   - PBS consulted. ERCP attempted on 12/4 but unable to cannulate.   - PBS to attempt ERCP 12/6/17 as MRCP on 12/4 noted with stricture.   - Para 12/5/17, 3L removed ( down from 1497, cr 5.2, bili 2.5)

## 2017-12-06 NOTE — H&P
Pre-Procedure H and P Addendum    Patient seen and examined.  History and exam unchanged from prior history and physical.      Procedure: ERCP  Indication: Suspected biliary stricture S/P liver transplant  ASA Class:III  Airway: normal  Neck Mobility: full range of motion  Mallampatti score: per anesthesia  Anesthesia Plan: General    The impression and plan was discussed in detail with the patient. All questions have been answered and the patient voices understanding of our plan at this point. The risk of the procedure was discussed in detail which includes but not limited to bleeding, infection, perforation in some cases requiring surgery with its spectrum of complications.

## 2017-12-06 NOTE — SUBJECTIVE & OBJECTIVE
Scheduled Meds:   sodium chloride 0.9%   Intravenous Once    atovaquone  1,500 mg Oral Daily    ceFEPime (MAXIPIME) IVPB  1 g Intravenous Q24H    cycloSPORINE modified (NEORAL)  100 mg Oral BID    docusate sodium  100 mg Oral TID    ergocalciferol  50,000 Units Oral Q7 Days    heparin (porcine)  5,000 Units Subcutaneous Q8H    levETIRAcetam  500 mg Oral BID    multivitamin  1 tablet Oral Daily    pantoprazole  40 mg Oral Daily    polyethylene glycol  17 g Oral Daily    sodium bicarbonate  1,300 mg Oral TID    thiamine  100 mg Oral Daily    valGANciclovir  450 mg Oral Every Mon, Wed, Fri     Continuous Infusions:   PRN Meds:sodium chloride 0.9%, acetaminophen, bisacodyl, ondansetron, ondansetron, oxyCODONE, oxyCODONE, ramelteon, sodium chloride 0.9%, sodium chloride 0.9%    Review of Systems   Constitutional: Positive for activity change, fatigue and fever. Negative for chills.   Respiratory: Negative for cough, shortness of breath and wheezing.    Cardiovascular: Positive for leg swelling.   Gastrointestinal: Positive for abdominal distention, abdominal pain and constipation. Negative for nausea and vomiting.   Genitourinary: Negative for difficulty urinating and dysuria.   Skin: Positive for wound.   Allergic/Immunologic: Positive for immunocompromised state.   Neurological: Positive for weakness.   Psychiatric/Behavioral: Negative for confusion and decreased concentration. The patient is nervous/anxious.      Objective:     Vital Signs (Most Recent):  Temp: 97.5 °F (36.4 °C) (12/06/17 1202)  Pulse: 93 (12/06/17 1245)  Resp: 14 (12/06/17 1245)  BP: 108/68 (12/06/17 1245)  SpO2: 100 % (12/06/17 1245) Vital Signs (24h Range):  Temp:  [97.5 °F (36.4 °C)-99.4 °F (37.4 °C)] 97.5 °F (36.4 °C)  Pulse:  [] 93  Resp:  [13-20] 14  SpO2:  [98 %-100 %] 100 %  BP: ()/(5-70) 108/68     Weight: 81.1 kg (178 lb 14.4 oz)  Body mass index is 28.88 kg/m².    Intake/Output - Last 3 Shifts       12/04 0700 -  12/05 0659 12/05 0700 - 12/06 0659 12/06 0700 - 12/07 0659    P.O. 740 530 0    I.V. (mL/kg) 2500 (30.8)      Blood  350     IV Piggyback 350 50     Total Intake(mL/kg) 3590 (44.3) 930 (11.5) 0 (0)    Urine (mL/kg/hr) 495 (0.3) 815 (0.4) 275 (0.5)    Emesis/NG output 0 (0)      Other 0 (0)      Stool 0 (0) 0 (0)     Blood 0 (0)      Total Output 495 815 275    Net +3095 +115 -275           Urine Occurrence 0 x      Stool Occurrence 0 x 2 x     Emesis Occurrence 0 x            Physical Exam   Constitutional: He is oriented to person, place, and time. He appears well-developed and well-nourished. No distress.   HENT:   Head: Normocephalic and atraumatic.   Neck: Normal range of motion. Neck supple. No JVD present.   Cardiovascular: Regular rhythm and normal heart sounds.  Tachycardia present.    No murmur heard.  Pulmonary/Chest: Effort normal. No respiratory distress. He has decreased breath sounds in the right middle field, the right lower field and the left lower field. He has no wheezes. He exhibits no tenderness.   Abdominal: Soft. Bowel sounds are normal. He exhibits distension. There is tenderness (diffusely ). There is no rebound and no guarding.   Wound vac to incision   Musculoskeletal: Normal range of motion. He exhibits edema (2+ LE edema). He exhibits no tenderness.   Neurological: He is alert and oriented to person, place, and time. He has normal reflexes.   Skin: Skin is warm and dry. He is not diaphoretic.   Psychiatric: His behavior is normal. Judgment and thought content normal. His mood appears anxious.   Pt anxious and expressing feeling overwhelmed   Nursing note and vitals reviewed.      Laboratory:  Immunosuppressants         Stop Route Frequency     cycloSPORINE modified (NEORAL) capsule 100 mg      -- Oral 2 times daily        CBC:     Recent Labs  Lab 12/06/17  0634   WBC 6.90   RBC 3.01*   HGB 9.1*   HCT 26.6*      MCV 88   MCH 30.2   MCHC 34.2     CMP:     Recent Labs  Lab  12/06/17  0634   GLU 72   CALCIUM 8.5*   ALBUMIN 2.1*   PROT 5.1*   *   K 3.7   CO2 19*   CL 94*   BUN 56*   CREATININE 5.7*   ALKPHOS 135   ALT 5*   AST 17   BILITOT 9.1*     Coagulation:     Recent Labs  Lab 12/06/17  0634   INR 1.3*     Cardiac Markers: No results for input(s): CKMB, TROPONINT, MYOGLOBIN in the last 168 hours.  Labs within the past 24 hours have been reviewed.    Diagnostic Results:  I have personally reviewed all pertinent imaging studies.

## 2017-12-06 NOTE — ASSESSMENT & PLAN NOTE
- Has now remained afebrile.   - Cont with BS antibxs for treatment.    -Vanc level 46, will hold for now. Renally dose cefepime.   - ERCP unable to be performed 12/4. PBS to attempt again on 12/6.  - wound vac changed on 12/2. To be changed again 12/6/17.

## 2017-12-06 NOTE — ASSESSMENT & PLAN NOTE
- Cr level remains elevated. No need for HD at this time.   - Last HD 11/17. Now with moderate UOP.   - IV Lasix started per nephrology recs.   -  Creatinine significantly elevated today from 4.7 to 5.7.    -Will perform HD today per nephrology recs. appreciate assistance.   - Cont strict I/O's.

## 2017-12-06 NOTE — PLAN OF CARE
Problem: Patient Care Overview  Goal: Plan of Care Review  Outcome: Ongoing (interventions implemented as appropriate)    Pt AAOx4 with mother at the bedside.  Pt mother is attentive to and supportive of pt and actively involved in pt care.    VSS  Pt on tele running sinus rhythm with hr  80's-90's  BP low 100s/60's  Afebrile  Satting 100% on room air    Pt has 2+to3+ edema to BLE.  Abdomen is distended.   Pt had para yesterday 12/5 and had 3L taken off. Fluid diagnostics sent. Gauze dressing to RLQ CDI.     Chevron incision with delayed wound healing. WV to chevron continuous @ 125 with serous drainage in canister.  KEV Camp RN, to come back today for dressing change because pt was unavailable yesterday.    Pt c/o 7/10 pain to abdomen. Pt has received Oxy 10 mg twice.     R SC HD cath CDI. Dressing changed yesterday day by Cheikh DEGROOT RN.    Pt has been NPO after midnight in preparation for ERPC today ( showing in pt calender for 11 AM).    Orders in place for pt to receive 1 unit of FFP at 0600 and to repeat PT/INR 1 hour after transfusion is complete.     Vanc trough due before this AM's dose. Orders to not hang until resulted and not to hang unless trough is <20.     Pt up to bathroom with standby assist.  Pt ambulated the jenkins with walker.  Pt remained free from falls or injury thus far.   Bed is in low/ locked position, side rails are up x 2, call light is in reach.   Will continue to monitor.

## 2017-12-06 NOTE — PROGRESS NOTES
[facility]  Liver Transplant  Progress Note    Patient Name: Jhonny Diana  MRN: [id number]  Admission Date: 12/3/2017  Hospital Length of Stay: 3 days  Code Status: Full Code  Primary Care Provider: Primary Doctor No  Post-Operative Day: 48    ORGAN:   LIVER  Disease Etiology: Acute Alcoholic Hepatitis  Donor Type:    - Brain Death  CDC High Risk:   No  Donor CMV Status:   Donor CMV Status: Positive  Donor HBcAB:   Negative  Donor HCV Status:   Negative  Whole or Partial: Whole Liver  Biliary Anastomosis: End to End  Arterial Anatomy: Standard  Subjective:     History of Present Illness:  Mr. Diana is a 26yo man w/a history of alcoholic cirrhosis s/p DDLT 10/19/2017; c/b seizures (swtiched off prograf to cyclo), ATN requiring HD (last HD ), superificial wound infection s/p wound vac to chevron incision, and recent admit for fevers on  (discharged on empiric augmentin for suspected superficial wound infection) and readmitted  again with fever. Found to have peritonitis (WBC 5000, 75% PNM) neg for bile leak. He was treated initially with vanc/cefepime. Repeat cell counts  with some improvement (WBC 1400, 45% PNM). Pt transitioned to augmentin upon d/c .      Pt presented to the ED this morning with subjective fever (100.5) and general malaise beginning at 8pm yesterday. His abdomen is soft, but diffusely tender. Labs reveal elevated WBC count to 12.7 from 6.5 in addition to elevated tbili 3.5 from 2.3 when compared to labs 3 days ago, despite being on augmentin for recent diagnosis of peritonitis. He denies nausea, vomiting, diarrhea, congestion, cough, CP or SOB. He has received 1 L bolus NS in the ED. Infectious mosley intiated, will start BS abx (vanc/cef) and obtain CT abdomen.     He has a wound vac in place.     Hospital Course:  Interval History:  Patient very tearful this morning, expressing feelings of anxiety and being overwhelmed- discussed at length, reassured  patient.  With continued abdominal pain. Low grade temps (tmax 99.4) overnight. With rising tbili from 6.2 to 9.1 this morning. PBS consulted for ERCP attempted on 12/4 but unable to cannulate. PBS planning for ERCP today, 1 unit FFP given prior. Paracentesis yesterday, 3L removed,  (decreased from 1497), bili 2.5, cr 5.2. Serum creatinine today elevated from 4.7 to 5.7. Discussed with nephrology, will plan for HD today. Of note, nac level 46 this morning, will hold on further vanc for now. Cefepime renally dosed. Wound vac in place and recently changed on 12/3, plan to change today. PO intake poor- evaluated by dietician, if PO intake continues to be poor, will consider TPN tomorrow. Monitor.     Scheduled Meds:   sodium chloride 0.9%   Intravenous Once    atovaquone  1,500 mg Oral Daily    ceFEPime (MAXIPIME) IVPB  1 g Intravenous Q24H    cycloSPORINE modified (NEORAL)  100 mg Oral BID    docusate sodium  100 mg Oral TID    ergocalciferol  50,000 Units Oral Q7 Days    heparin (porcine)  5,000 Units Subcutaneous Q8H    levETIRAcetam  500 mg Oral BID    multivitamin  1 tablet Oral Daily    pantoprazole  40 mg Oral Daily    polyethylene glycol  17 g Oral Daily    sodium bicarbonate  1,300 mg Oral TID    thiamine  100 mg Oral Daily    valGANciclovir  450 mg Oral Every Mon, Wed, Fri     Continuous Infusions:   PRN Meds:sodium chloride 0.9%, acetaminophen, bisacodyl, ondansetron, ondansetron, oxyCODONE, oxyCODONE, ramelteon, sodium chloride 0.9%, sodium chloride 0.9%    Review of Systems   Constitutional: Positive for activity change, fatigue and fever. Negative for chills.   Respiratory: Negative for cough, shortness of breath and wheezing.    Cardiovascular: Positive for leg swelling.   Gastrointestinal: Positive for abdominal distention, abdominal pain and constipation. Negative for nausea and vomiting.   Genitourinary: Negative for difficulty urinating and dysuria.   Skin: Positive for wound.    Allergic/Immunologic: Positive for immunocompromised state.   Neurological: Positive for weakness.   Psychiatric/Behavioral: Negative for confusion and decreased concentration. The patient is nervous/anxious.      Objective:     Vital Signs (Most Recent):  Temp: 97.5 °F (36.4 °C) (12/06/17 1202)  Pulse: 93 (12/06/17 1245)  Resp: 14 (12/06/17 1245)  BP: 108/68 (12/06/17 1245)  SpO2: 100 % (12/06/17 1245) Vital Signs (24h Range):  Temp:  [97.5 °F (36.4 °C)-99.4 °F (37.4 °C)] 97.5 °F (36.4 °C)  Pulse:  [] 93  Resp:  [13-20] 14  SpO2:  [98 %-100 %] 100 %  BP: ()/(5-70) 108/68     Weight: 81.1 kg (178 lb 14.4 oz)  Body mass index is 28.88 kg/m².    Intake/Output - Last 3 Shifts       12/04 0700 - 12/05 0659 12/05 0700 - 12/06 0659 12/06 0700 - 12/07 0659    P.O. 740 530 0    I.V. (mL/kg) 2500 (30.8)      Blood  350     IV Piggyback 350 50     Total Intake(mL/kg) 3590 (44.3) 930 (11.5) 0 (0)    Urine (mL/kg/hr) 495 (0.3) 815 (0.4) 275 (0.5)    Emesis/NG output 0 (0)      Other 0 (0)      Stool 0 (0) 0 (0)     Blood 0 (0)      Total Output 495 815 275    Net +3095 +115 -275           Urine Occurrence 0 x      Stool Occurrence 0 x 2 x     Emesis Occurrence 0 x            Physical Exam   Constitutional: He is oriented to person, place, and time. He appears well-developed and well-nourished. No distress.   HENT:   Head: Normocephalic and atraumatic.   Neck: Normal range of motion. Neck supple. No JVD present.   Cardiovascular: Regular rhythm and normal heart sounds.  Tachycardia present.    No murmur heard.  Pulmonary/Chest: Effort normal. No respiratory distress. He has decreased breath sounds in the right middle field, the right lower field and the left lower field. He has no wheezes. He exhibits no tenderness.   Abdominal: Soft. Bowel sounds are normal. He exhibits distension. There is tenderness (diffusely ). There is no rebound and no guarding.   Wound vac to incision   Musculoskeletal: Normal range of  motion. He exhibits edema (2+ LE edema). He exhibits no tenderness.   Neurological: He is alert and oriented to person, place, and time. He has normal reflexes.   Skin: Skin is warm and dry. He is not diaphoretic.   Psychiatric: His behavior is normal. Judgment and thought content normal. His mood appears anxious.   Pt anxious and expressing feeling overwhelmed   Nursing note and vitals reviewed.      Laboratory:  Immunosuppressants         Stop Route Frequency     cycloSPORINE modified (NEORAL) capsule 100 mg      -- Oral 2 times daily        CBC:     Recent Labs  Lab 12/06/17  0634   WBC 6.90   RBC 3.01*   HGB 9.1*   HCT 26.6*      MCV 88   MCH 30.2   MCHC 34.2     CMP:     Recent Labs  Lab 12/06/17  0634   GLU 72   CALCIUM 8.5*   ALBUMIN 2.1*   PROT 5.1*   *   K 3.7   CO2 19*   CL 94*   BUN 56*   CREATININE 5.7*   ALKPHOS 135   ALT 5*   AST 17   BILITOT 9.1*     Coagulation:     Recent Labs  Lab 12/06/17  0634   INR 1.3*     Cardiac Markers: No results for input(s): CKMB, TROPONINT, MYOGLOBIN in the last 168 hours.  Labs within the past 24 hours have been reviewed.    Diagnostic Results:  I have personally reviewed all pertinent imaging studies.    Assessment/Plan:     * Fever    - Has now remained afebrile.   - Cont with BS antibxs for treatment.    -Vanc level 46, will hold for now. Renally dose cefepime.   - ERCP unable to be performed 12/4. PBS to attempt again on 12/6.  - wound vac changed on 12/2. To be changed again 12/6/17.               Hyperbilirubinemia    - Elevated t bili post liver txp in setting of abdominal pain, decreased appetitive and intermittent nausea.  PBS consulted to assess duct per ERCP.  - ERCP performed today but PBS unable to cannulate. MRCP with stricture.  - Tbili continues to rise today from 6.2 to 9.1.   - ERCP today with PBS.         LAUAR (acute kidney injury)    - Cr level remains elevated. No need for HD at this time.   - Last HD 11/17. Now with moderate UOP.   - IV  Lasix started per nephrology recs.   -  Creatinine significantly elevated today from 4.7 to 5.7.    -Will perform HD today per nephrology recs. appreciate assistance.   - Cont strict I/O's.           Liver transplanted    - A/w fevers and hyperbilirubinemia.   - Tbili with increase again this am. CT abdomen reviewed.  - LFTs stable.   - PBS consulted. ERCP attempted on 12/4 but unable to cannulate.   - PBS to attempt ERCP 12/6/17 as MRCP on 12/4 noted with stricture.   - Para 12/5/17, 3L removed ( down from 1497, cr 5.2, bili 2.5)          Long-term use of immunosuppressant medication    Cont cyclo. Monitor for toxicities and adjust dose accordingly          Prophylactic immunotherapy    - See long term immunosuppressant          Delayed surgical wound healing    - Wound vac in place, last changed 12/2.  - wound care consulted for wound vac change 12/6/17.           Peritonitis    - Diagnosed on previous admission.  Last para 11/29 with improving cell counts WBC 1400 with 45% PNMs   Was d/c'ed on augmentin, will switch to vanc/cef (see leukocytosis)   - Repeat Paracentesis 12/5/17, with  (improving from 1497)            Leukocytosis    - Now improved.   - WBC elevated to 12.7 from 6.5 on admit. Subjective fever 100.5 at home. Recent dx peritonitis on augmentin outpt, now placed on BS antibxs empirically.   - Infectious mosley initiated  - CT abdomen reviewed.    -Para 12/5/17,  improving from ~1500.         Anemia of chronic disease    - H/H stable. Received 1 unit PRBC 12/5/17  - Monitor CBC daily.           Seizure    - Cont keppra              VTE Risk Mitigation         Ordered     heparin (porcine) injection 5,000 Units  Every 8 hours     Route:  Subcutaneous        12/04/17 0933     Medium Risk of VTE  Once      12/03/17 0249     Place sequential compression device  Until discontinued      12/03/17 0249          The patients clinical status was discussed at multidisplinary rounds, involving  transplant surgery, transplant medicine, pharmacy, nursing, nutrition, and social work    Discharge Planning: Not a candidate for d/c at this time. Awaiting ERCP results from today. Planning for HD today per nephrology recs.     Monitor kidney function --> may need outpt HD  Monitor HH needs vs rehab for deconditioned status      Fransisca Thomas PA-C  Liver Transplant  [facility]

## 2017-12-06 NOTE — ANESTHESIA POSTPROCEDURE EVALUATION
"Anesthesia Post Evaluation    Patient: Jhonny Diana    Procedure(s) Performed: Procedure(s) (LRB):  ERCP (N/A)    Final Anesthesia Type: general  Patient location during evaluation: PACU  Patient participation: Yes- Able to Participate  Level of consciousness: awake and alert and oriented  Post-procedure vital signs: reviewed and stable  Pain management: adequate  Airway patency: patent  PONV status at discharge: No PONV  Anesthetic complications: no      Cardiovascular status: stable  Respiratory status: unassisted, spontaneous ventilation and room air  Hydration status: euvolemic  Follow-up not needed.        Visit Vitals  /68   Pulse 93   Temp 36.4 °C (97.5 °F) (Axillary)   Resp 14   Ht 5' 6" (1.676 m)   Wt 81.1 kg (178 lb 14.4 oz)   SpO2 100%   BMI 28.88 kg/m²       Pain/Diogenes Score: Pain Assessment Performed: Yes (12/6/2017 12:45 PM)  Presence of Pain: denies (12/6/2017 12:45 PM)  Pain Rating Prior to Med Admin: 6 (12/6/2017 10:42 AM)  Pain Rating Post Med Admin: 6 (12/6/2017 10:42 AM)  Diogenes Score: 9 (12/6/2017 12:45 PM)      "

## 2017-12-06 NOTE — TREATMENT PLAN
AES Follow-up Note    ERCP 12/6/17:  Impression:   - The major papilla appeared to be prominent.                        - A mild biliary stricture was found in the                         post-transplant anastomosis. The stricture was                         inflammatory.                        - A biliary sphincterotomy was performed.                        - One biliary stent was placed into the common bile                         duct.  Recommendation:                               - Watch for pancreatitis, bleeding, perforation,                         and cholangitis.                        - Repeat ERCP in 10 weeks to remove stent.      Thank you for allowing us to participate in the care of Jhonny Diana. Please do not hesitate to call us with questions.    Shen Sousa  Gastroenterology Fellow (PGY [id number])

## 2017-12-07 ENCOUNTER — TELEPHONE (OUTPATIENT)
Dept: GASTROENTEROLOGY | Facility: CLINIC | Age: 27
End: 2017-12-07

## 2017-12-07 PROBLEM — E80.6 HYPERBILIRUBINEMIA: Status: ACTIVE | Noted: 2017-12-07

## 2017-12-07 PROBLEM — E87.70 VOLUME OVERLOAD: Status: ACTIVE | Noted: 2017-12-07

## 2017-12-07 LAB
ALBUMIN SERPL BCP-MCNC: 1.7 G/DL
ALP SERPL-CCNC: 135 U/L
ALT SERPL W/O P-5'-P-CCNC: 5 U/L
ANION GAP SERPL CALC-SCNC: 14 MMOL/L
AST SERPL-CCNC: 19 U/L
BACTERIA SPEC ANAEROBE CULT: NORMAL
BASOPHILS # BLD AUTO: 0.04 K/UL
BASOPHILS NFR BLD: 0.9 %
BILIRUB SERPL-MCNC: 6 MG/DL
BUN SERPL-MCNC: 29 MG/DL
CALCIUM SERPL-MCNC: 8.1 MG/DL
CHLORIDE SERPL-SCNC: 101 MMOL/L
CO2 SERPL-SCNC: 21 MMOL/L
CREAT SERPL-MCNC: 3.3 MG/DL
CYCLOSPORINE BLD LC/MS/MS-MCNC: 211 NG/ML
DIASTOLIC DYSFUNCTION: NO
DIFFERENTIAL METHOD: ABNORMAL
EOSINOPHIL # BLD AUTO: 0 K/UL
EOSINOPHIL NFR BLD: 0.5 %
ERYTHROCYTE [DISTWIDTH] IN BLOOD BY AUTOMATED COUNT: 17.3 %
EST. GFR  (AFRICAN AMERICAN): 27.8 ML/MIN/1.73 M^2
EST. GFR  (NON AFRICAN AMERICAN): 24.1 ML/MIN/1.73 M^2
GLOBAL PERICARDIAL EFFUSION: NORMAL
GLUCOSE SERPL-MCNC: 60 MG/DL
HCT VFR BLD AUTO: 23.1 %
HGB BLD-MCNC: 7.9 G/DL
IMM GRANULOCYTES # BLD AUTO: 0.16 K/UL
IMM GRANULOCYTES NFR BLD AUTO: 3.6 %
INR PPP: 1.2
LYMPHOCYTES # BLD AUTO: 1.2 K/UL
LYMPHOCYTES NFR BLD: 26.2 %
MAGNESIUM SERPL-MCNC: 1.7 MG/DL
MCH RBC QN AUTO: 31.2 PG
MCHC RBC AUTO-ENTMCNC: 34.2 G/DL
MCV RBC AUTO: 91 FL
MITRAL VALVE REGURGITATION: NORMAL
MONOCYTES # BLD AUTO: 0.6 K/UL
MONOCYTES NFR BLD: 13.1 %
NEUTROPHILS # BLD AUTO: 2.5 K/UL
NEUTROPHILS NFR BLD: 55.7 %
NRBC BLD-RTO: 0 /100 WBC
PHOSPHATE SERPL-MCNC: 2.8 MG/DL
PLATELET # BLD AUTO: 222 K/UL
PMV BLD AUTO: 10.5 FL
POCT GLUCOSE: 63 MG/DL (ref 70–110)
POTASSIUM SERPL-SCNC: 3.3 MMOL/L
PROT SERPL-MCNC: 4.5 G/DL
PROTHROMBIN TIME: 12.7 SEC
RBC # BLD AUTO: 2.53 M/UL
RETIRED EF AND QEF - SEE NOTES: 65 (ref 55–65)
SODIUM SERPL-SCNC: 136 MMOL/L
TRICUSPID VALVE REGURGITATION: NORMAL
VANCOMYCIN SERPL-MCNC: 31.2 UG/ML
WBC # BLD AUTO: 4.43 K/UL

## 2017-12-07 PROCEDURE — 80202 ASSAY OF VANCOMYCIN: CPT

## 2017-12-07 PROCEDURE — P9047 ALBUMIN (HUMAN), 25%, 50ML: HCPCS | Performed by: PHYSICIAN ASSISTANT

## 2017-12-07 PROCEDURE — 36415 COLL VENOUS BLD VENIPUNCTURE: CPT

## 2017-12-07 PROCEDURE — 84100 ASSAY OF PHOSPHORUS: CPT

## 2017-12-07 PROCEDURE — 63600175 PHARM REV CODE 636 W HCPCS: Performed by: PHYSICIAN ASSISTANT

## 2017-12-07 PROCEDURE — 85610 PROTHROMBIN TIME: CPT

## 2017-12-07 PROCEDURE — 99232 SBSQ HOSP IP/OBS MODERATE 35: CPT | Mod: ,,, | Performed by: INTERNAL MEDICINE

## 2017-12-07 PROCEDURE — 99233 SBSQ HOSP IP/OBS HIGH 50: CPT | Mod: 24,,, | Performed by: PHYSICIAN ASSISTANT

## 2017-12-07 PROCEDURE — 25000003 PHARM REV CODE 250: Performed by: PHYSICIAN ASSISTANT

## 2017-12-07 PROCEDURE — 80158 DRUG ASSAY CYCLOSPORINE: CPT

## 2017-12-07 PROCEDURE — 93306 TTE W/DOPPLER COMPLETE: CPT

## 2017-12-07 PROCEDURE — 63600175 PHARM REV CODE 636 W HCPCS: Performed by: INTERNAL MEDICINE

## 2017-12-07 PROCEDURE — 63600175 PHARM REV CODE 636 W HCPCS: Performed by: NURSE PRACTITIONER

## 2017-12-07 PROCEDURE — 93306 TTE W/DOPPLER COMPLETE: CPT | Mod: 26,,, | Performed by: INTERNAL MEDICINE

## 2017-12-07 PROCEDURE — 20600001 HC STEP DOWN PRIVATE ROOM

## 2017-12-07 PROCEDURE — 80053 COMPREHEN METABOLIC PANEL: CPT

## 2017-12-07 PROCEDURE — 83735 ASSAY OF MAGNESIUM: CPT

## 2017-12-07 PROCEDURE — 25000003 PHARM REV CODE 250: Performed by: NURSE PRACTITIONER

## 2017-12-07 PROCEDURE — 85025 COMPLETE CBC W/AUTO DIFF WBC: CPT

## 2017-12-07 RX ORDER — SIMETHICONE 80 MG
1 TABLET,CHEWABLE ORAL 3 TIMES DAILY PRN
Status: DISCONTINUED | OUTPATIENT
Start: 2017-12-07 | End: 2017-12-20 | Stop reason: HOSPADM

## 2017-12-07 RX ORDER — ALBUMIN HUMAN 250 G/1000ML
25 SOLUTION INTRAVENOUS 2 TIMES DAILY
Status: COMPLETED | OUTPATIENT
Start: 2017-12-07 | End: 2017-12-07

## 2017-12-07 RX ORDER — URSODIOL 300 MG/1
300 CAPSULE ORAL 2 TIMES DAILY
Status: DISCONTINUED | OUTPATIENT
Start: 2017-12-07 | End: 2017-12-09

## 2017-12-07 RX ORDER — POTASSIUM CHLORIDE 20 MEQ/1
20 TABLET, EXTENDED RELEASE ORAL ONCE
Status: COMPLETED | OUTPATIENT
Start: 2017-12-07 | End: 2017-12-07

## 2017-12-07 RX ADMIN — DOCUSATE SODIUM 100 MG: 100 CAPSULE, LIQUID FILLED ORAL at 05:12

## 2017-12-07 RX ADMIN — LEVETIRACETAM 500 MG: 500 TABLET, FILM COATED ORAL at 09:12

## 2017-12-07 RX ADMIN — LEVETIRACETAM 500 MG: 500 TABLET, FILM COATED ORAL at 08:12

## 2017-12-07 RX ADMIN — DOCUSATE SODIUM 100 MG: 100 CAPSULE, LIQUID FILLED ORAL at 08:12

## 2017-12-07 RX ADMIN — ALBUMIN (HUMAN) 25 G: 12.5 SOLUTION INTRAVENOUS at 12:12

## 2017-12-07 RX ADMIN — CYCLOSPORINE 100 MG: 100 CAPSULE, LIQUID FILLED ORAL at 06:12

## 2017-12-07 RX ADMIN — CYCLOSPORINE 100 MG: 100 CAPSULE, LIQUID FILLED ORAL at 09:12

## 2017-12-07 RX ADMIN — HEPARIN SODIUM 5000 UNITS: 5000 INJECTION, SOLUTION INTRAVENOUS; SUBCUTANEOUS at 05:12

## 2017-12-07 RX ADMIN — SODIUM BICARBONATE 650 MG TABLET 1300 MG: at 08:12

## 2017-12-07 RX ADMIN — SODIUM BICARBONATE 650 MG TABLET 1300 MG: at 05:12

## 2017-12-07 RX ADMIN — DOCUSATE SODIUM 100 MG: 100 CAPSULE, LIQUID FILLED ORAL at 03:12

## 2017-12-07 RX ADMIN — OXYCODONE HYDROCHLORIDE 10 MG: 5 TABLET ORAL at 09:12

## 2017-12-07 RX ADMIN — Medication 100 MG: at 09:12

## 2017-12-07 RX ADMIN — HEPARIN SODIUM 5000 UNITS: 5000 INJECTION, SOLUTION INTRAVENOUS; SUBCUTANEOUS at 08:12

## 2017-12-07 RX ADMIN — CEFEPIME HYDROCHLORIDE 1 G: 1 INJECTION, SOLUTION INTRAVENOUS at 08:12

## 2017-12-07 RX ADMIN — PANTOPRAZOLE SODIUM 40 MG: 40 TABLET, DELAYED RELEASE ORAL at 09:12

## 2017-12-07 RX ADMIN — ALBUMIN (HUMAN) 25 G: 12.5 SOLUTION INTRAVENOUS at 08:12

## 2017-12-07 RX ADMIN — THERA TABS 1 TABLET: TAB at 09:12

## 2017-12-07 RX ADMIN — BISACODYL 10 MG: 10 SUPPOSITORY RECTAL at 03:12

## 2017-12-07 RX ADMIN — POTASSIUM CHLORIDE 20 MEQ: 1500 TABLET, EXTENDED RELEASE ORAL at 12:12

## 2017-12-07 RX ADMIN — OXYCODONE HYDROCHLORIDE 10 MG: 5 TABLET ORAL at 05:12

## 2017-12-07 RX ADMIN — SIMETHICONE CHEW TAB 80 MG 80 MG: 80 TABLET ORAL at 12:12

## 2017-12-07 RX ADMIN — SODIUM BICARBONATE 650 MG TABLET 1300 MG: at 03:12

## 2017-12-07 RX ADMIN — ATOVAQUONE 1500 MG: 750 SUSPENSION ORAL at 12:12

## 2017-12-07 RX ADMIN — HEPARIN SODIUM 5000 UNITS: 5000 INJECTION, SOLUTION INTRAVENOUS; SUBCUTANEOUS at 03:12

## 2017-12-07 RX ADMIN — POLYETHYLENE GLYCOL 3350 17 G: 17 POWDER, FOR SOLUTION ORAL at 09:12

## 2017-12-07 RX ADMIN — URSODIOL 300 MG: 300 CAPSULE ORAL at 12:12

## 2017-12-07 RX ADMIN — OXYCODONE HYDROCHLORIDE 10 MG: 5 TABLET ORAL at 08:12

## 2017-12-07 RX ADMIN — URSODIOL 300 MG: 300 CAPSULE ORAL at 08:12

## 2017-12-07 RX ADMIN — ONDANSETRON 4 MG: 2 INJECTION INTRAMUSCULAR; INTRAVENOUS at 12:12

## 2017-12-07 RX ADMIN — OXYCODONE HYDROCHLORIDE 10 MG: 5 TABLET ORAL at 12:12

## 2017-12-07 NOTE — ASSESSMENT & PLAN NOTE
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 He was admitted for peritonitis from 11/24-11/30 for peritonitis.     Plan:   - LAURA on CKD 4 (baseline sCr 3.9 - 4.0). Likely instrinsic etiology, multifactorial secondary to sepsis vs. Toxins  - Cont sodium biacarbonate 1,300 mg q TID for acidosis. Please.   - Tolerated HD well without complications.   - Strict inatake and output, UO overnight since admission.  - no need for RRT today, optimistic renal recovery as still making urine and now BB improving  - would recommend to maintain Hg > 8 for improved chance on renal recovery.  - Avoid nephrotoxic medication, renal dose medications and avoid contrast   - Renal diet

## 2017-12-07 NOTE — PROGRESS NOTES
Initiated 3 hour dialysis tx via RIJ cath. Good blood flow obtained. Alarms set and lines visible and secure. Will attempt to remove 2 liters of fluid.

## 2017-12-07 NOTE — PROGRESS NOTES
3 hour dialysis tx complete. Tolerated tx well. Vital signs stable throughout tx. Ultrafiltrated 2 liters of fluid. Right IJ cath worked well, maintaining a 350 BFR without difficulty. Cath flushed with NS, caps applied and lines clamped.

## 2017-12-07 NOTE — PROGRESS NOTES
Pt has 9PM and 10PM medications due, but pt currently receiving HD at the bedside.   Will wait for HD to be completed before administering PM meds.

## 2017-12-07 NOTE — ASSESSMENT & PLAN NOTE
- Cr level remains elevated  - Last HD 11/1 prior to admit. Pt still making urine.    -  Creatinine significantly elevated on 12/6 and HD started.    - Will assess daily need for HD. Appreciate Nephrology recs.   - patient with volume overload but low albumin.     - will give albumin x2   - plan for 2D echo, results pending  - Cont strict I/O's.

## 2017-12-07 NOTE — ASSESSMENT & PLAN NOTE
- A/w fevers and hyperbilirubinemia.   - Tbili with increase again this am. CT abdomen reviewed.  - LFTs stable.   - PBS consulted. ERCP attempted on 12/4 but unable to cannulate. MRCP on 12/4 noted with stricture.  - ERCP 12/6/17  With post-anastamosis stricture, stent placed.   - Para 12/5/17, 3L removed ( down from 1497, cr 5.2, bili 2.5)

## 2017-12-07 NOTE — PROGRESS NOTES
[facility]  Liver Transplant  Progress Note    Patient Name: Jhonny Diana  MRN: [id number]  Admission Date: 12/3/2017  Hospital Length of Stay: 4 days  Code Status: Full Code  Primary Care Provider: Primary Doctor No  Post-Operative Day: 49    ORGAN:   LIVER  Disease Etiology: Acute Alcoholic Hepatitis  Donor Type:    - Brain Death  CDC High Risk:   No  Donor CMV Status:   Donor CMV Status: Positive  Donor HBcAB:   Negative  Donor HCV Status:   Negative  Whole or Partial: Whole Liver  Biliary Anastomosis: End to End  Arterial Anatomy: Standard  Subjective:     History of Present Illness:  Mr. Diana is a 28yo man w/a history of alcoholic cirrhosis s/p DDLT 10/19/2017; c/b seizures (swtiched off prograf to cyclo), ATN requiring HD (last HD ), superificial wound infection s/p wound vac to chevron incision, and recent admit for fevers on  (discharged on empiric augmentin for suspected superficial wound infection) and readmitted  again with fever. Found to have peritonitis (WBC 5000, 75% PNM) neg for bile leak. He was treated initially with vanc/cefepime. Repeat cell counts  with some improvement (WBC 1400, 45% PNM). Pt transitioned to augmentin upon d/c .      Pt presented to the ED this morning with subjective fever (100.5) and general malaise beginning at 8pm yesterday. His abdomen is soft, but diffusely tender. Labs reveal elevated WBC count to 12.7 from 6.5 in addition to elevated tbili 3.5 from 2.3 when compared to labs 3 days ago, despite being on augmentin for recent diagnosis of peritonitis. He denies nausea, vomiting, diarrhea, congestion, cough, CP or SOB. He has received 1 L bolus NS in the ED. Infectious mosley intiated, will start BS abx (vanc/cef) and obtain CT abdomen.     He has a wound vac in place.     Hospital Course:  Interval History:  Pt reports feeling slightly improved today. ERCP yesterday with stent placement tbili improved today to 6.0 from 9.1  yesterday. Starting ursodiol today. Abdominal pain slightly improved- some distention noted with gas pains. Will give suppository x1 and prn simethicone. Encouraged patient to ambulate. Pt tolerated HD x3 hrs with 2L removed. Will discuss with nephrology further need for HD. Remains overloaded but with low albumin, will give albumin x2 and obtain 2D echo. PO intake poor- evaluated by dietician, will perform calorie count. If PO intake continues to be poor, will consider TPN/TF tomorrow. Plan, goals, and expectations discussed at length today with pt and pt's mom by myself and staff surgeon. Monitor.     Scheduled Meds:   sodium chloride 0.9%   Intravenous Once    albumin human 25%  25 g Intravenous BID    atovaquone  1,500 mg Oral Daily    ceFEPime (MAXIPIME) IVPB  1 g Intravenous Q24H    cycloSPORINE modified (NEORAL)  100 mg Oral BID    docusate sodium  100 mg Oral TID    ergocalciferol  50,000 Units Oral Q7 Days    heparin (porcine)  5,000 Units Subcutaneous Q8H    levETIRAcetam  500 mg Oral BID    multivitamin  1 tablet Oral Daily    pantoprazole  40 mg Oral Daily    polyethylene glycol  17 g Oral Daily    sodium bicarbonate  1,300 mg Oral TID    thiamine  100 mg Oral Daily    ursodiol  300 mg Oral BID    valGANciclovir  450 mg Oral Every Mon, Wed, Fri     Continuous Infusions:   PRN Meds:sodium chloride 0.9%, acetaminophen, albumin human 25%, bisacodyl, midodrine, ondansetron, ondansetron, oxyCODONE, oxyCODONE, ramelteon, simethicone, sodium chloride 0.9%, sodium chloride 0.9%    Review of Systems   Constitutional: Positive for activity change, fatigue and fever. Negative for chills.   Respiratory: Negative for cough, shortness of breath and wheezing.    Cardiovascular: Positive for leg swelling.   Gastrointestinal: Positive for abdominal distention, abdominal pain and constipation. Negative for nausea and vomiting.   Genitourinary: Negative for difficulty urinating and dysuria.   Skin: Positive  for wound.   Allergic/Immunologic: Positive for immunocompromised state.   Neurological: Positive for weakness.   Psychiatric/Behavioral: Negative for confusion and decreased concentration. The patient is nervous/anxious.      Objective:     Vital Signs (Most Recent):  Temp: 99.5 °F (37.5 °C) (12/07/17 1253)  Pulse: 98 (12/07/17 1253)  Resp: 18 (12/07/17 1253)  BP: (!) 98/56 (12/07/17 1253)  SpO2: 100 % (12/07/17 1253) Vital Signs (24h Range):  Temp:  [96.5 °F (35.8 °C)-99.5 °F (37.5 °C)] 99.5 °F (37.5 °C)  Pulse:  [] 98  Resp:  [16-20] 18  SpO2:  [100 %] 100 %  BP: ()/(54-78) 98/56     Weight: 81.1 kg (178 lb 14.4 oz)  Body mass index is 28.88 kg/m².    Intake/Output - Last 3 Shifts       12/05 0700 - 12/06 0659 12/06 0700 - 12/07 0659 12/07 0700 - 12/08 0659    P.O. 530 930     I.V. (mL/kg)       Blood 350 250     Other  500     IV Piggyback 50 50     Total Intake(mL/kg) 930 (11.5) 1730 (21.3)     Urine (mL/kg/hr) 815 (0.4) 650 (0.3)     Emesis/NG output       Other  2500 (1.3)     Stool 0 (0) 0 (0)     Blood       Total Output 815 3150      Net +115 -1420             Urine Occurrence  0 x     Stool Occurrence 2 x 1 x           Physical Exam   Constitutional: He is oriented to person, place, and time. He appears well-developed and well-nourished. No distress.   HENT:   Head: Normocephalic and atraumatic.   Neck: Normal range of motion. Neck supple. No JVD present.   Cardiovascular: Regular rhythm and normal heart sounds.  Tachycardia present.    No murmur heard.  Pulmonary/Chest: Effort normal. No respiratory distress. He has decreased breath sounds in the right middle field, the right lower field and the left lower field. He has no wheezes. He exhibits no tenderness.   Abdominal: Soft. Bowel sounds are normal. He exhibits distension. There is tenderness (diffusely ). There is no rebound and no guarding.   Wound vac to incision   Musculoskeletal: Normal range of motion. He exhibits edema (2+ LE edema).  He exhibits no tenderness.   Neurological: He is alert and oriented to person, place, and time. He has normal reflexes.   Skin: Skin is warm and dry. He is not diaphoretic.   Psychiatric: His behavior is normal. Judgment and thought content normal. His mood appears anxious.   Pt anxious and expressing feeling overwhelmed   Nursing note and vitals reviewed.      Laboratory:  Immunosuppressants         Stop Route Frequency     cycloSPORINE modified (NEORAL) capsule 100 mg      -- Oral 2 times daily        CBC:     Recent Labs  Lab 12/07/17  0525   WBC 4.43   RBC 2.53*   HGB 7.9*   HCT 23.1*      MCV 91   MCH 31.2*   MCHC 34.2     CMP:     Recent Labs  Lab 12/07/17  0525   GLU 60*   CALCIUM 8.1*   ALBUMIN 1.7*   PROT 4.5*      K 3.3*   CO2 21*      BUN 29*   CREATININE 3.3*   ALKPHOS 135   ALT 5*   AST 19   BILITOT 6.0*     Coagulation:     Recent Labs  Lab 12/07/17  0525   INR 1.2     Cardiac Markers: No results for input(s): CKMB, TROPONINT, MYOGLOBIN in the last 168 hours.  Labs within the past 24 hours have been reviewed.    Diagnostic Results:  I have personally reviewed all pertinent imaging studies.    Assessment/Plan:     * Fever    - Has now remained afebrile.   - Cont with BS antibxs for treatment.    -Vanc level 46, will hold for now. Renally dose cefepime.   - ERCP unable to be performed 12/4. Successful ERCP 12/6/17 with stent placed.   - wound vac changed on 12/6/17.               Hyperbilirubinemia    - Elevated t bili post liver txp in setting of abdominal pain, decreased appetitive and intermittent nausea.  PBS consulted to assess duct per ERCP.  - ERCP performed today but PBS unable to cannulate. MRCP with stricture.  - ERCP 12/6/17 with post-anastamosis stricture with stent placed.   - Tbili peaked at 9.1, now trending down today.   -started ursodiol 12/7/17.         LAURA (acute kidney injury)    - Cr level remains elevated  - Last HD 11/1 prior to admit. Pt still making urine.    -   Creatinine significantly elevated on 12/6 and HD started.    - Will assess daily need for HD. Appreciate Nephrology recs.   - patient with volume overload but low albumin.     - will give albumin x2   - plan for 2D echo, results pending  - Cont strict I/O's.           Liver transplanted    - A/w fevers and hyperbilirubinemia.   - Tbili with increase again this am. CT abdomen reviewed.  - LFTs stable.   - PBS consulted. ERCP attempted on 12/4 but unable to cannulate. MRCP on 12/4 noted with stricture.  - ERCP 12/6/17  With post-anastamosis stricture, stent placed.   - Para 12/5/17, 3L removed ( down from 1497, cr 5.2, bili 2.5)          Long-term use of immunosuppressant medication    Cont cyclo. Monitor for toxicities and adjust dose accordingly          Prophylactic immunotherapy    - See long term immunosuppressant          Delayed surgical wound healing    - Wound vac in place, last changed 12/2.  - wound care consulted for wound vac change 12/6/17.           Peritonitis    - Diagnosed on previous admission.  Last para 11/29 with improving cell counts WBC 1400 with 45% PNMs   Was d/c'ed on augmentin, will switch to vanc/cef (see leukocytosis)   - Repeat Paracentesis 12/5/17, with  (improving from 1497)            Leukocytosis    - Now improved.   - WBC elevated to 12.7 from 6.5 on admit. Subjective fever 100.5 at home. Recent dx peritonitis on augmentin outpt, now placed on BS antibxs empirically.   - Infectious mosley initiated  - CT abdomen reviewed.    -Para 12/5/17,  improving from ~1500.         Anemia of chronic disease    - H/H stable. Received 1 unit PRBC 12/5/17  - Monitor CBC daily.           Seizure    - Cont keppra              VTE Risk Mitigation         Ordered     heparin (porcine) injection 5,000 Units  Every 8 hours     Route:  Subcutaneous        12/04/17 0933     Medium Risk of VTE  Once      12/03/17 0249     Place sequential compression device  Until discontinued       12/03/17 0249          The patients clinical status was discussed at multidisplinary rounds, involving transplant surgery, transplant medicine, pharmacy, nursing, nutrition, and social work    Discharge Planning: Not a candidate for d/c at this time. Assessing daily need for HD. Continuing to strongly encourage PO intake (with likely plans for TPN/TF tomorrow) and ambulation (PT ordered).     Monitor kidney function --> may need outpt HD  Monitor HH needs vs rehab for deconditioned status      Fransisca Thomas PA-C  Liver Transplant  [facility]

## 2017-12-07 NOTE — PHYSICIAN QUERY
PT Name: Jhonny Diana  MR #: [id number]     Physician Query Form - Documentation Clarification      CDS/: Roxanna Deal  RN,CCDS         Contact information: [email or web address]    This form is a permanent document in the medical record.     Query Date: December 7, 2017    By submitting this query, we are merely seeking further clarification of documentation. Please utilize your independent clinical judgment when addressing the question(s) below.    The Medical record reflects the following:    Supporting Clinical Findings Location in Medical Record      Pt presented to the ED this morning with subjective fever (100.5) and general malaise beginning at 8pm yesterday.   Labs reveal elevated WBC count to 12.7 from 6.5 in addition to elevated tbili 3.5 from 2.3 when compared to labs 3 days ago, despite being on augmentin for recent diagnosis of peritonitis.    Peritonitis     Diagnosed on previous admission  Last para 11/29 with improving cell counts WBC 1400 with 45% PNMs   Was d/c'ed on augmentin, will switch to vanc/cef (see leukocytosis)         12/3 h/p   Recent dx peritonitis on augmentin outpt, now placed on BS antibxs empirically.        12/6 progress note                                                                            Doctor, Please specify diagnosis or diagnoses associated with above clinical findings.    Provider Use Only      (  X  )  Peritonitis is a current diagnosis    (    )  Peritonitis is not a current diagnosis                                                                                                                         [  ] Clinically undetermined

## 2017-12-07 NOTE — PHYSICIAN QUERY
"PT Name: Jhonny Diana  MR #: [id number]    Physician Query Form - Nutrition Clarification     CDS/: Roxanna Deal RN, CCDS             Contact information: [email or web address]    This form is a permanent document in the medical record.     Query Date: December 7, 2017    By submitting this query, we are merely seeking further clarification of documentation.. Please utilize your independent clinical judgment when addressing the question(s) below.    The Medical record contains the following:   Indicators  Supporting Clinical Findings Location in Medical Record   X % of Estimated Energy Intake over a time frame from p.o., TF, or TPN , pt eating poorly since transplant but reports "improved  Poor appetite 2' ascites and abdominal distention, poor food choices  Pt consuming ~25-50% EEN and EPN 12/5 nutrition note    Weight Status over a time frame     X Subcutaneous Fat and/or Muscle Loss edematous, loss of muscle mass 12/5 nutrition note    Fluid Accumulation or Edema      Reduced  Strength     X Wt / BMI / Usual Body Weight bmi 28.3 12/5 nutrition note    Delayed Wound Healing / Failure to Thrive     X Acute or Chronic Illness status post liver transplant on 10/19 presents to the ED with a fever  history of alcoholic cirrhosis s/p DDLT 10/19/2017;  Peritonitis, gregory, delayed surgical wound healing  12/3 ed note      12/3 h/p    12/4 progress note    Medication      Treatment      Other       AND / ASPEN Clinical Characteristics (October 2011)  A minimum of two characteristics is recommended for diagnosing either moderate or severe malnutrition   Mild Malnutrition Moderate Malnutrition Severe Malnutrition   Energy Intake from p.o., TF or TPN. < 75% intake of estimated energy needs for less than 7 days < 75% intake of estimated energy needs for greater than 7 days < 50% intake of estimated energy needs for > 5 days   Weight Loss 1-2% in 1 month  5% in 3 months  7.5% in 6 months  10% in 1 year 1-2 % in 1 " week  5% in 1 month  7.5% in 3 months  10% in 6 months  20% in 1 year > 2% in 1 week  > 5% in 1 month  > 7.5% in 3 months  > 10% in 6 months  > 20% in 1 year   Physical Findings     None *Mild subcutaneous fat and/or muscle loss  *Mild fluid accumulation  *Stage II decubitus  *Surgical wound or non-healing wound *Mod/severe subcutaneous fat and/or muscle loss  *Mod/severe fluid accumulation  *Stage III or IV decubitus  *Non-healing surgical wound     Provider, please specify diagnosis or diagnoses associated with above clinical findings.    [ ] Mild Protein-Calorie Malnutrition  [X ] Moderate Protein-Calorie Malnutrition  [ ] Cachexia  [ ] Anorexia  [ ] Other Nutritional Diagnosis (please specify): ____________________________________  [ ] Other: ________________________________  [ ] Clinically Undetermined    Please document in your progress notes daily for the duration of treatment until resolved and include in your discharge summary.

## 2017-12-07 NOTE — ASSESSMENT & PLAN NOTE
- Has now remained afebrile.   - Cont with BS antibxs for treatment.    -Vanc level 46, will hold for now. Renally dose cefepime.   - ERCP unable to be performed 12/4. Successful ERCP 12/6/17 with stent placed.   - wound vac changed on 12/6/17.

## 2017-12-07 NOTE — SUBJECTIVE & OBJECTIVE
Interval History: NAEON Afebrile. MS better this am, ambulating the hals with walker. More awake and quicker to respond. S/p ERCP with stent placed and improving BB. Hg dropped from. 9.1 to 7.9. Tolerated HD yesterday with UF 2000 ml.  ml overnight.    Review of patient's allergies indicates:   Allergen Reactions    Bactrim [sulfamethoxazole-trimethoprim] Other (See Comments)     Mookie Trell Syndrome     Current Facility-Administered Medications   Medication Frequency    0.9%  NaCl infusion PRN    0.9%  NaCl infusion Once    acetaminophen tablet 650 mg Q8H PRN    albumin human 25% bottle 25 g PRN    albumin human 25% bottle 25 g BID    atovaquone suspension 1,500 mg Daily    bisacodyl suppository 10 mg Daily PRN    cefepime in dextrose 5 % 1 gram/50 mL IVPB 1 g Q24H    cycloSPORINE modified (NEORAL) capsule 100 mg BID    docusate sodium capsule 100 mg TID    ergocalciferol capsule 50,000 Units Q7 Days    heparin (porcine) injection 5,000 Units Q8H    levETIRAcetam tablet 500 mg BID    midodrine tablet 5 mg PRN    multivitamin tablet 1 tablet Daily    ondansetron disintegrating tablet 8 mg Q8H PRN    ondansetron injection 4 mg Q8H PRN    oxyCODONE immediate release tablet 10 mg Q4H PRN    oxyCODONE immediate release tablet 5 mg Q4H PRN    pantoprazole EC tablet 40 mg Daily    polyethylene glycol packet 17 g Daily    ramelteon tablet 8 mg Nightly PRN    simethicone chewable tablet 80 mg TID PRN    sodium bicarbonate tablet 1,300 mg TID    sodium chloride 0.9% flush 3 mL PRN    sodium chloride 0.9% flush 3 mL PRN    thiamine tablet 100 mg Daily    ursodiol capsule 300 mg BID    valGANciclovir tablet 450 mg Every Mon, Wed, Fri       Objective:     Vital Signs (Most Recent):  Temp: 99.5 °F (37.5 °C) (12/07/17 1253)  Pulse: 98 (12/07/17 1253)  Resp: 18 (12/07/17 1253)  BP: (!) 98/56 (12/07/17 1253)  SpO2: 100 % (12/07/17 1253)  O2 Device (Oxygen Therapy): room air (12/07/17 1367)  Vital Signs (24h Range):  Temp:  [96.5 °F (35.8 °C)-99.5 °F (37.5 °C)] 99.5 °F (37.5 °C)  Pulse:  [] 98  Resp:  [16-20] 18  SpO2:  [100 %] 100 %  BP: ()/(54-78) 98/56     Weight: 81.1 kg (178 lb 14.4 oz) (12/04/17 0515)  Body mass index is 28.88 kg/m².  Body surface area is 1.94 meters squared.    I/O last 3 completed shifts:  In: 2120 [P.O.:1270; Blood:250; Other:500; IV Piggyback:100]  Out: 3835 [Urine:1335; Other:2500]    Physical Exam   Constitutional: He is oriented to person, place, and time. He has a sickly appearance. No distress. Nasal cannula in place.   Pleasantly disoriented with constant reassurance    HENT:   Head: Normocephalic and atraumatic.   Eyes: Pupils are equal, round, and reactive to light. Scleral icterus is present.   Neck: Normal range of motion. Neck supple. No JVD present.   Cardiovascular: Regular rhythm.  Tachycardia present.  Exam reveals no gallop and no friction rub.    No murmur heard.  Pulmonary/Chest: Effort normal. He has decreased breath sounds in the right lower field and the left lower field. He has no wheezes. He has no rales.   Abdominal: Soft. He exhibits distension (positive asitis much improved post paracentesis less tense). There is no tenderness (2+ pitting edema in LE ). There is no guarding.   Wound vac to incision. Positive ascitis    Musculoskeletal: He exhibits edema (2+ pitting edema in LE ).   Neurological: He is alert and oriented to person, place, and time.   Positive Asterixis, somnolent and nauseated   Skin: Skin is warm and dry. Capillary refill takes less than 2 seconds. He is not diaphoretic.   Psychiatric: His behavior is normal. He exhibits a depressed mood.   Nursing note and vitals reviewed.      Significant Labs:  ABGs: No results for input(s): PH, PCO2, HCO3, POCSATURATED, BE in the last 168 hours.  BMP:     Recent Labs  Lab 12/07/17  0525   GLU 60*      CO2 21*   BUN 29*   CREATININE 3.3*   CALCIUM 8.1*   MG 1.7     CBC:     Recent  Labs  Lab 12/07/17  0525   WBC 4.43   RBC 2.53*   HGB 7.9*   HCT 23.1*      MCV 91   MCH 31.2*   MCHC 34.2     CMP:     Recent Labs  Lab 12/07/17  0525   GLU 60*   CALCIUM 8.1*   ALBUMIN 1.7*   PROT 4.5*      K 3.3*   CO2 21*      BUN 29*   CREATININE 3.3*   ALKPHOS 135   ALT 5*   AST 19   BILITOT 6.0*     All labs within the past 24 hours have been reviewed.     Significant Imaging:  Labs: Reviewed  X-Ray: Reviewed

## 2017-12-07 NOTE — PROGRESS NOTES
EZEKIEL met with patient and pts mom at bedside during rounds on TSU with team and without team today.   Pt presents in bedside chair, alert and oriented x 4, engaging, good eye contact, asking and answering questions and tearful.   Pt is having a difficult time coping with medical complications related to liver transplant.   Pt does report feeling a little better today.  Dr. Lindsay, transplant staff took his time talking with pt and pts mom today at bedside about plans for patient and how he is progressing.   Pt has needed to resume HD during this hospitalization and there were no guarantees at this point if kidney function will return or not.   Nutrition, physical therapy, kidney function, wound care needs with wound vac and liver function discussed today.     Pt and pts mom verbalize understanding of all information provided to them today.   SW and mid level provider discussing with patient to work on time management and using timers to determine how long it is taking him to accomplish tasks, stress importance of therapy, eating, relaxation techniques.    Pt has declined any psychotherapy in hospital setting,however SW recommending psychotherapy for patient once he is discharge from the hospital until he is able to [facility].       Pts mom asking about wheelchair rental and wanting to return wheelchair since pt in hospital and not using right now.    EZEKIEL spoke with Franklin at [facility] ([phone]) who reports pts WC rental is paid for right now through 12/21/17 by ins.   Pt has no co-pay on this rental.   If patient continues to need the WC rental for past 13 months, then he would then own the WC.    Franklin reports that if pt is still in hospital on12/21 and wants to return wc he could, but new orders would have to be placed if he needs a wc again at [facility].   EZEKIEL will present information to pt and pts mom.   EZEKIEL attempted to speak with pts mom this afternoon via phone about wc rental details, but she was on  the phone with pts STD agency.       EZEKIEL did leave a vm for [place] with [facility] to ensure that pt still has an HD chair on MWF at 3pm and the pt will likely need to resume outpatient HD at dc from this hospitalization.    EZEKIEL remains available for all transplant resources, education, psychosocial support and assist with all dc planning needs.

## 2017-12-07 NOTE — TELEPHONE ENCOUNTER
----- Message from Shen Sousa MD sent at 12/7/2017 10:04 AM CST -----  [place],    Repeat ERCP in 10 weeks to remove stent.

## 2017-12-07 NOTE — PLAN OF CARE
Pt is AAOx4; afebrile; vital signs stable. Appetite is poor and he c/o nausea with eating. Nausea treated with zofran. Appetite is poor; he is being encouraged to eat proteins. Albumin started today. Echo done today. He is c/o abdominal pain. Oxycodone given. Suppository done and he did have a BM. He is up with standby assist and a walker. Mother is at bedside, attentive to pt.

## 2017-12-07 NOTE — PLAN OF CARE
Problem: Patient Care Overview  Goal: Plan of Care Review  Outcome: Ongoing (interventions implemented as appropriate)    Pt AAOx4 with mother at the bedside.  Pt mother continues to be attentive to and supportive of pt and actively involved in pt care.    Pt had HD performed at the bedside last night.   Pt had 2L taken of over 3hrs.     Pt had ERCP with stent placement done yesterday  .     VSS  Pt on tele running ST with hr low 100's-120's  BP low 90's-100s/60's  Afebrile- TMAX 99.4h  Satting 100% on room air    Pt has 2+to3+ edema to BLE.  Abdomen is distended.     WV to chevron on continuous setting @ 125 with serous drainage in canister.  WV dressing changed 12/6 by WC RN, Zoë.    Pt c/o 7/10 pain to abdomen. Pt has received Oxy 10 mg twice.     R SC HD cath CDI.   R hand 20g saline locked. Dressing CDI.      Pt up to bathroom with standby assist or with walker and assistance with WV.  Pt remained free from falls or injury thus far.   Bed is in low/ locked position, side rails are up x 2, call light is in reach.   Will continue to monitor.

## 2017-12-07 NOTE — PHYSICIAN QUERY
PT Name: Jhonny Diana  MR #: [id number]     Physician Query Form - Diagnosis Clarification      CDS/: Roxanna Deal RN, CCDS              Contact information: [email or web address]    This form is a permanent document in the medical record.     Query Date: December 7, 2017    By submitting this query, we are merely seeking further clarification of documentation.  Please utilize your independent clinical judgment when addressing the question(s) below.     The medical record contains the following:      Findings Supporting Clinical Information Location in Medical Record                   LAURA on CKD (baseline sCr 3.9 - 4.0). Likely instrinsic etiology, multifactorial secondary to sepsis vs. Toxins Patient states he took his temperature this evening at 8:00 and noted it to be elevated. Tmax 100.9. Patient   Vs- T-98.7, 105/59, rr-20, hr-107    Wbc-12.79->6.84->4.43  La 0.7  Blood cultures ngtd 12/3 ed note          12/3, 12/5, 12/7 lab  12/3 lab  12/3 lab    12/3 consult note     Please clarify if the __sepsis_________ diagnosis has been:    [  ] Ruled In  [X  ] Ruled In, Now Resolved  [  ] Ruled Out  [  ] Clinically insignificant  [  ] Clinically undetermined  [  ] Other/Clarification of findings (please specify)_______________________________    Please document in your progress notes daily for the duration of treatment, until resolved, and include in your discharge summary.

## 2017-12-07 NOTE — SUBJECTIVE & OBJECTIVE
Scheduled Meds:   sodium chloride 0.9%   Intravenous Once    albumin human 25%  25 g Intravenous BID    atovaquone  1,500 mg Oral Daily    ceFEPime (MAXIPIME) IVPB  1 g Intravenous Q24H    cycloSPORINE modified (NEORAL)  100 mg Oral BID    docusate sodium  100 mg Oral TID    ergocalciferol  50,000 Units Oral Q7 Days    heparin (porcine)  5,000 Units Subcutaneous Q8H    levETIRAcetam  500 mg Oral BID    multivitamin  1 tablet Oral Daily    pantoprazole  40 mg Oral Daily    polyethylene glycol  17 g Oral Daily    sodium bicarbonate  1,300 mg Oral TID    thiamine  100 mg Oral Daily    ursodiol  300 mg Oral BID    valGANciclovir  450 mg Oral Every Mon, Wed, Fri     Continuous Infusions:   PRN Meds:sodium chloride 0.9%, acetaminophen, albumin human 25%, bisacodyl, midodrine, ondansetron, ondansetron, oxyCODONE, oxyCODONE, ramelteon, simethicone, sodium chloride 0.9%, sodium chloride 0.9%    Review of Systems   Constitutional: Positive for activity change, fatigue and fever. Negative for chills.   Respiratory: Negative for cough, shortness of breath and wheezing.    Cardiovascular: Positive for leg swelling.   Gastrointestinal: Positive for abdominal distention, abdominal pain and constipation. Negative for nausea and vomiting.   Genitourinary: Negative for difficulty urinating and dysuria.   Skin: Positive for wound.   Allergic/Immunologic: Positive for immunocompromised state.   Neurological: Positive for weakness.   Psychiatric/Behavioral: Negative for confusion and decreased concentration. The patient is nervous/anxious.      Objective:     Vital Signs (Most Recent):  Temp: 99.5 °F (37.5 °C) (12/07/17 1253)  Pulse: 98 (12/07/17 1253)  Resp: 18 (12/07/17 1253)  BP: (!) 98/56 (12/07/17 1253)  SpO2: 100 % (12/07/17 1253) Vital Signs (24h Range):  Temp:  [96.5 °F (35.8 °C)-99.5 °F (37.5 °C)] 99.5 °F (37.5 °C)  Pulse:  [] 98  Resp:  [16-20] 18  SpO2:  [100 %] 100 %  BP: ()/(54-78) 98/56      Weight: 81.1 kg (178 lb 14.4 oz)  Body mass index is 28.88 kg/m².    Intake/Output - Last 3 Shifts       12/05 0700 - 12/06 0659 12/06 0700 - 12/07 0659 12/07 0700 - 12/08 0659    P.O. 530 930     I.V. (mL/kg)       Blood 350 250     Other  500     IV Piggyback 50 50     Total Intake(mL/kg) 930 (11.5) 1730 (21.3)     Urine (mL/kg/hr) 815 (0.4) 650 (0.3)     Emesis/NG output       Other  2500 (1.3)     Stool 0 (0) 0 (0)     Blood       Total Output 815 3150      Net +115 -1420             Urine Occurrence  0 x     Stool Occurrence 2 x 1 x           Physical Exam   Constitutional: He is oriented to person, place, and time. He appears well-developed and well-nourished. No distress.   HENT:   Head: Normocephalic and atraumatic.   Neck: Normal range of motion. Neck supple. No JVD present.   Cardiovascular: Regular rhythm and normal heart sounds.  Tachycardia present.    No murmur heard.  Pulmonary/Chest: Effort normal. No respiratory distress. He has decreased breath sounds in the right middle field, the right lower field and the left lower field. He has no wheezes. He exhibits no tenderness.   Abdominal: Soft. Bowel sounds are normal. He exhibits distension. There is tenderness (diffusely ). There is no rebound and no guarding.   Wound vac to incision   Musculoskeletal: Normal range of motion. He exhibits edema (2+ LE edema). He exhibits no tenderness.   Neurological: He is alert and oriented to person, place, and time. He has normal reflexes.   Skin: Skin is warm and dry. He is not diaphoretic.   Psychiatric: His behavior is normal. Judgment and thought content normal. His mood appears anxious.   Pt anxious and expressing feeling overwhelmed   Nursing note and vitals reviewed.      Laboratory:  Immunosuppressants         Stop Route Frequency     cycloSPORINE modified (NEORAL) capsule 100 mg      -- Oral 2 times daily        CBC:     Recent Labs  Lab 12/07/17  0525   WBC 4.43   RBC 2.53*   HGB 7.9*   HCT 23.1*   PLT  222   MCV 91   MCH 31.2*   MCHC 34.2     CMP:     Recent Labs  Lab 12/07/17  0525   GLU 60*   CALCIUM 8.1*   ALBUMIN 1.7*   PROT 4.5*      K 3.3*   CO2 21*      BUN 29*   CREATININE 3.3*   ALKPHOS 135   ALT 5*   AST 19   BILITOT 6.0*     Coagulation:     Recent Labs  Lab 12/07/17  0525   INR 1.2     Cardiac Markers: No results for input(s): CKMB, TROPONINT, MYOGLOBIN in the last 168 hours.  Labs within the past 24 hours have been reviewed.    Diagnostic Results:  I have personally reviewed all pertinent imaging studies.

## 2017-12-07 NOTE — TREATMENT PLAN
[facility] Follow-up Note    Went by to see patient post procedure, laying comfortably, denies any new or worsening abdominal pain, states he feels about the same today overall.  Bilirubin trending down.      ERCP 12/6/17:  Impression:   - The major papilla appeared to be prominent.                        - A mild biliary stricture was found in the                         post-transplant anastomosis. The stricture was                         inflammatory.                        - A biliary sphincterotomy was performed.                        - One biliary stent was placed into the common bile                         duct.  Recommendation:                               - Watch for pancreatitis, bleeding, perforation,                         and cholangitis.                        - Repeat ERCP in 10 weeks to remove stent.      Thank you for allowing us to participate in the care of Jhonny Diana. Please do not hesitate to call us with questions.    Shen Sousa  Gastroenterology Fellow (PGY 6)

## 2017-12-07 NOTE — ASSESSMENT & PLAN NOTE
- Elevated t bili post liver txp in setting of abdominal pain, decreased appetitive and intermittent nausea.  PBS consulted to assess duct per ERCP.  - ERCP performed today but PBS unable to cannulate. MRCP with stricture.  - ERCP 12/6/17 with post-anastamosis stricture with stent placed.   - Tbili peaked at 9.1, now trending down today.   -started ursodiol 12/7/17.

## 2017-12-07 NOTE — PROGRESS NOTES
[facility]  Nephrology  Progress Note    Patient Name: Jhonny Diana  MRN: [id number]  Admission Date: 12/3/2017  Hospital Length of Stay: 3 days  Attending Provider: Solis Chapa MD   Primary Care Physician: Primary Doctor No  Principal Problem:Fever    Subjective:     HPI: Mr. Diana is a 28yo man w/a history of alcoholic cirrhosis s/p DDLT 10/19/2017; c/b seizures (swtiched off prograf to cyclo), ATN requiring HD (last HD 11/17), superificial wound infection s/p wound vac to chevron incision, and recent admit for fevers on 11/21 (discharged on empiric augmentin for suspected superficial wound infection) and readmitted 11/24 again with fever. Found to have peritonitis (WBC 5000, 75% PNM) neg for bile leak. He was treated initially with vanc/cefepime. Repeat cell counts 11/29 with some improvement (WBC 1400, 45% PNM). Pt transitioned to augmentin upon d/c 11/30.      Pt presented to the ED this morning with subjective fever (100.5) and general malaise beginning at 8pm yesterday. His abdomen is soft, but diffusely tender. Labs reveal elevated WBC count to 12.7 from 6.5 in addition to elevated tbili 3.5 from 2.3 when compared to labs 3 days ago, despite being on augmentin for recent diagnosis of peritonitis. He denies nausea, vomiting, diarrhea, congestion, cough, CP or SOB. He has received 1 L bolus NS in the ED.    Interval History: NAEON Afebrile. Pleasantly disoriented today with positive asterixis. ERCP today since BB rising. S/p 3 lts paracentesis yesterday.  Transfused 1 units PRBC's with appropriate response. sCr increased to 5.7 from 4.7mg/dl. UO increased to 815 ml overnight. CO2 19 this am.    Review of patient's allergies indicates:   Allergen Reactions    Bactrim [sulfamethoxazole-trimethoprim] Other (See Comments)     Mooike Trell Syndrome     Current Facility-Administered Medications   Medication Frequency    0.9%  NaCl infusion PRN    0.9%  NaCl infusion Once    acetaminophen  tablet 650 mg Q8H PRN    albumin human 25% bottle 25 g PRN    atovaquone suspension 1,500 mg Daily    bisacodyl suppository 10 mg Daily PRN    cefepime in dextrose 5 % 1 gram/50 mL IVPB 1 g Q24H    cycloSPORINE modified (NEORAL) capsule 100 mg BID    docusate sodium capsule 100 mg TID    ergocalciferol capsule 50,000 Units Q7 Days    heparin (porcine) injection 5,000 Units Q8H    levETIRAcetam tablet 500 mg BID    midodrine tablet 5 mg PRN    multivitamin tablet 1 tablet Daily    ondansetron disintegrating tablet 8 mg Q8H PRN    ondansetron injection 4 mg Q8H PRN    oxyCODONE immediate release tablet 10 mg Q4H PRN    oxyCODONE immediate release tablet 5 mg Q4H PRN    pantoprazole EC tablet 40 mg Daily    polyethylene glycol packet 17 g Daily    ramelteon tablet 8 mg Nightly PRN    sodium bicarbonate tablet 1,300 mg TID    sodium chloride 0.9% flush 3 mL PRN    sodium chloride 0.9% flush 3 mL PRN    thiamine tablet 100 mg Daily    valGANciclovir tablet 450 mg Every Mon, Wed, Fri       Objective:     Vital Signs (Most Recent):  Temp: 97.9 °F (36.6 °C) (12/06/17 1650)  Pulse: 88 (12/06/17 1650)  Resp: 18 (12/06/17 1650)  BP: 111/67 (12/06/17 1650)  SpO2: 100 % (12/06/17 1650)  O2 Device (Oxygen Therapy): room air (12/06/17 1650) Vital Signs (24h Range):  Temp:  [97.5 °F (36.4 °C)-99.4 °F (37.4 °C)] 97.9 °F (36.6 °C)  Pulse:  [] 88  Resp:  [13-20] 18  SpO2:  [99 %-100 %] 100 %  BP: ()/(5-69) 111/67     Weight: 81.1 kg (178 lb 14.4 oz) (12/04/17 0515)  Body mass index is 28.88 kg/m².  Body surface area is 1.94 meters squared.    I/O last 3 completed shifts:  In: 2970 [P.O.:1270; I.V.:1000; Blood:350; IV Piggyback:350]  Out: 985 [Urine:985]    Physical Exam   Constitutional: He has a sickly appearance. No distress. Nasal cannula in place.   Pleasantly disoriented with constant reassurance    HENT:   Head: Normocephalic and atraumatic.   Eyes: Pupils are equal, round, and reactive to  light. Scleral icterus is present.   Neck: Normal range of motion. Neck supple. JVD present.   Cardiovascular: Regular rhythm.  Tachycardia present.  Exam reveals no gallop and no friction rub.    No murmur heard.  Pulmonary/Chest: Effort normal. He has decreased breath sounds in the right lower field and the left lower field. He has no wheezes. He has no rales.   Abdominal: Soft. He exhibits distension (positive asitis much improved post paracentesis). There is tenderness (2+ pitting edema in LE ). There is no guarding.   Wound vac to incision. Positive ascitis    Musculoskeletal: He exhibits edema.   Neurological:   Positive Asterixis, somnolent and nauseated   Skin: Skin is warm and dry. Capillary refill takes less than 2 seconds. He is not diaphoretic.   Psychiatric: He has a normal mood and affect. His behavior is normal.   Nursing note and vitals reviewed.      Significant Labs:  ABGs: No results for input(s): PH, PCO2, HCO3, POCSATURATED, BE in the last 168 hours.  BMP:     Recent Labs  Lab 12/06/17  0634   GLU 72   CL 94*   CO2 19*   BUN 56*   CREATININE 5.7*   CALCIUM 8.5*   MG 1.9     CBC:     Recent Labs  Lab 12/06/17  0634   WBC 6.90   RBC 3.01*   HGB 9.1*   HCT 26.6*      MCV 88   MCH 30.2   MCHC 34.2     CMP:     Recent Labs  Lab 12/06/17  0634   GLU 72   CALCIUM 8.5*   ALBUMIN 2.1*   PROT 5.1*   *   K 3.7   CO2 19*   CL 94*   BUN 56*   CREATININE 5.7*   ALKPHOS 135   ALT 5*   AST 17   BILITOT 9.1*     All labs within the past 24 hours have been reviewed.     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 He was admitted for peritonitis from 11/24-11/30 for peritonitis.     Plan:   - LAURA on CKD 4 (baseline sCr 3.9 - 4.0). Likely instrinsic etiology, multifactorial secondary to sepsis vs.  Toxins  - Increase sodium biacarbonate 1,300 mg q TID for acidosis. Please.   - Need ABG. Ordered this am.  Discussed with primary team.  - Change to 120 mg BID  - Strict inatake and output, UO overnight since admission.  - Will do HD nocturnal for metabolic clearance x 3 hrs  - albumin given with paracenthesis  - Avoid nephrotoxic medication, renal dose medications and avoid contrast   - Renal diet  - Target UF 2 lts as tolerated keep MAP > 65            Thank you for your consult. I will follow-up with patient. Please contact us if you have any additional questions.    Krish Frausto MD  Nephrology  [facility]    ATTENDING PHYSICIAN ATTESTATION  I have personally interviewed and examined the patient. I thoroughly reviewed the demographic, clinical, laboratorial and imaging information available in medical records. I agree with the assessment and recommendations provided by the subspecialty resident. Dr. rFausto was under my supervision.

## 2017-12-07 NOTE — ASSESSMENT & PLAN NOTE
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 He was admitted for peritonitis from 11/24-11/30 for peritonitis.     Plan:   - LAURA on CKD 4 (baseline sCr 3.9 - 4.0). Likely instrinsic etiology, multifactorial secondary to sepsis vs. Toxins  - Increase sodium biacarbonate 1,300 mg q TID for acidosis. Please.   - Need ABG. Ordered this am.  Discussed with primary team.  - Change to 120 mg BID  - Strict inatake and output, UO overnight since admission.  - Will do HD nocturnal for metabolic clearance x 3 hrs  - albumin given with paracenthesis  - Avoid nephrotoxic medication, renal dose medications and avoid contrast   - Renal diet  - Target UF 2 lts as tolerated keep MAP > 65

## 2017-12-08 PROBLEM — E46 PROTEIN-CALORIE MALNUTRITION: Status: ACTIVE | Noted: 2017-12-08

## 2017-12-08 PROBLEM — K65.9 PERITONITIS: Status: ACTIVE | Noted: 2017-12-08

## 2017-12-08 PROBLEM — E87.70 HYPERVOLEMIA: Status: ACTIVE | Noted: 2017-12-08

## 2017-12-08 PROBLEM — E03.9 HYPOTHYROID: Status: ACTIVE | Noted: 2017-12-08

## 2017-12-08 LAB
ABO + RH BLD: NORMAL
ALBUMIN SERPL BCP-MCNC: 2.1 G/DL
ALP SERPL-CCNC: 121 U/L
ALT SERPL W/O P-5'-P-CCNC: 6 U/L
ANION GAP SERPL CALC-SCNC: 13 MMOL/L
AST SERPL-CCNC: 17 U/L
BACTERIA BLD CULT: NORMAL
BACTERIA BLD CULT: NORMAL
BASOPHILS # BLD AUTO: 0.05 K/UL
BASOPHILS NFR BLD: 1.2 %
BILIRUB SERPL-MCNC: 5.2 MG/DL
BLD GP AB SCN CELLS X3 SERPL QL: NORMAL
BLD PROD TYP BPU: NORMAL
BLOOD UNIT EXPIRATION DATE: NORMAL
BLOOD UNIT TYPE CODE: 5100
BLOOD UNIT TYPE: NORMAL
BUN SERPL-MCNC: 35 MG/DL
CALCIUM SERPL-MCNC: 8.2 MG/DL
CHLORIDE SERPL-SCNC: 101 MMOL/L
CO2 SERPL-SCNC: 21 MMOL/L
CODING SYSTEM: NORMAL
CREAT SERPL-MCNC: 3.9 MG/DL
CYCLOSPORINE BLD LC/MS/MS-MCNC: 144 NG/ML
DIFFERENTIAL METHOD: ABNORMAL
DISPENSE STATUS: NORMAL
EOSINOPHIL # BLD AUTO: 0 K/UL
EOSINOPHIL NFR BLD: 0.2 %
ERYTHROCYTE [DISTWIDTH] IN BLOOD BY AUTOMATED COUNT: 17.2 %
EST. GFR  (AFRICAN AMERICAN): 22.7 ML/MIN/1.73 M^2
EST. GFR  (NON AFRICAN AMERICAN): 19.7 ML/MIN/1.73 M^2
GLUCOSE SERPL-MCNC: 74 MG/DL
HCT VFR BLD AUTO: 23.1 %
HGB BLD-MCNC: 7.6 G/DL
IMM GRANULOCYTES # BLD AUTO: 0.12 K/UL
IMM GRANULOCYTES NFR BLD AUTO: 2.8 %
INR PPP: 1.2
LYMPHOCYTES # BLD AUTO: 1.1 K/UL
LYMPHOCYTES NFR BLD: 25.1 %
MAGNESIUM SERPL-MCNC: 1.6 MG/DL
MCH RBC QN AUTO: 31.3 PG
MCHC RBC AUTO-ENTMCNC: 32.9 G/DL
MCV RBC AUTO: 95 FL
MONOCYTES # BLD AUTO: 0.6 K/UL
MONOCYTES NFR BLD: 13.3 %
NEUTROPHILS # BLD AUTO: 2.4 K/UL
NEUTROPHILS NFR BLD: 57.4 %
NRBC BLD-RTO: 0 /100 WBC
PHOSPHATE SERPL-MCNC: 3.5 MG/DL
PLATELET # BLD AUTO: 176 K/UL
PMV BLD AUTO: 10.4 FL
POTASSIUM SERPL-SCNC: 3.5 MMOL/L
PROT SERPL-MCNC: 4.6 G/DL
PROTHROMBIN TIME: 12.1 SEC
RBC # BLD AUTO: 2.43 M/UL
SODIUM SERPL-SCNC: 135 MMOL/L
T4 FREE SERPL-MCNC: 0.53 NG/DL
T4 SERPL-MCNC: <3 UG/DL
THYROPEROXIDASE IGG SERPL-ACNC: <6 IU/ML
TRANS ERYTHROCYTES VOL PATIENT: NORMAL ML
TSH SERPL DL<=0.005 MIU/L-ACNC: 11.47 UIU/ML
WBC # BLD AUTO: 4.22 K/UL

## 2017-12-08 PROCEDURE — 83735 ASSAY OF MAGNESIUM: CPT

## 2017-12-08 PROCEDURE — 63600175 PHARM REV CODE 636 W HCPCS: Performed by: INTERNAL MEDICINE

## 2017-12-08 PROCEDURE — 99222 1ST HOSP IP/OBS MODERATE 55: CPT | Mod: ,,, | Performed by: INTERNAL MEDICINE

## 2017-12-08 PROCEDURE — 97608 NEG PRS WND THER NDME>50SQCM: CPT

## 2017-12-08 PROCEDURE — 25000003 PHARM REV CODE 250: Performed by: NURSE PRACTITIONER

## 2017-12-08 PROCEDURE — 86850 RBC ANTIBODY SCREEN: CPT

## 2017-12-08 PROCEDURE — 80158 DRUG ASSAY CYCLOSPORINE: CPT

## 2017-12-08 PROCEDURE — 80053 COMPREHEN METABOLIC PANEL: CPT

## 2017-12-08 PROCEDURE — 63600175 PHARM REV CODE 636 W HCPCS: Performed by: PHYSICIAN ASSISTANT

## 2017-12-08 PROCEDURE — 86900 BLOOD TYPING SEROLOGIC ABO: CPT

## 2017-12-08 PROCEDURE — 25000003 PHARM REV CODE 250: Performed by: PHYSICIAN ASSISTANT

## 2017-12-08 PROCEDURE — P9047 ALBUMIN (HUMAN), 25%, 50ML: HCPCS | Performed by: PHYSICIAN ASSISTANT

## 2017-12-08 PROCEDURE — P9021 RED BLOOD CELLS UNIT: HCPCS

## 2017-12-08 PROCEDURE — 84439 ASSAY OF FREE THYROXINE: CPT

## 2017-12-08 PROCEDURE — 20600001 HC STEP DOWN PRIVATE ROOM

## 2017-12-08 PROCEDURE — 86920 COMPATIBILITY TEST SPIN: CPT

## 2017-12-08 PROCEDURE — 86376 MICROSOMAL ANTIBODY EACH: CPT

## 2017-12-08 PROCEDURE — 84482 T3 REVERSE: CPT

## 2017-12-08 PROCEDURE — 25000003 PHARM REV CODE 250: Performed by: HOSPITALIST

## 2017-12-08 PROCEDURE — 63600175 PHARM REV CODE 636 W HCPCS: Performed by: HOSPITALIST

## 2017-12-08 PROCEDURE — 85025 COMPLETE CBC W/AUTO DIFF WBC: CPT

## 2017-12-08 PROCEDURE — 99232 SBSQ HOSP IP/OBS MODERATE 35: CPT | Mod: ,,, | Performed by: INTERNAL MEDICINE

## 2017-12-08 PROCEDURE — 84443 ASSAY THYROID STIM HORMONE: CPT

## 2017-12-08 PROCEDURE — 90935 HEMODIALYSIS ONE EVALUATION: CPT

## 2017-12-08 PROCEDURE — 84436 ASSAY OF TOTAL THYROXINE: CPT

## 2017-12-08 PROCEDURE — 84100 ASSAY OF PHOSPHORUS: CPT

## 2017-12-08 PROCEDURE — 99233 SBSQ HOSP IP/OBS HIGH 50: CPT | Mod: 24,,, | Performed by: PHYSICIAN ASSISTANT

## 2017-12-08 PROCEDURE — 36415 COLL VENOUS BLD VENIPUNCTURE: CPT

## 2017-12-08 PROCEDURE — 85610 PROTHROMBIN TIME: CPT

## 2017-12-08 RX ORDER — ALBUMIN HUMAN 250 G/1000ML
25 SOLUTION INTRAVENOUS ONCE
Status: COMPLETED | OUTPATIENT
Start: 2017-12-08 | End: 2017-12-08

## 2017-12-08 RX ORDER — ALBUMIN HUMAN 250 G/1000ML
25 SOLUTION INTRAVENOUS ONCE
Status: CANCELLED | OUTPATIENT
Start: 2017-12-08 | End: 2017-12-08

## 2017-12-08 RX ORDER — SODIUM CHLORIDE 9 MG/ML
INJECTION, SOLUTION INTRAVENOUS ONCE
Status: DISCONTINUED | OUTPATIENT
Start: 2017-12-08 | End: 2017-12-12

## 2017-12-08 RX ORDER — HEPARIN SODIUM 1000 [USP'U]/ML
1000 INJECTION, SOLUTION INTRAVENOUS; SUBCUTANEOUS
Status: DISCONTINUED | OUTPATIENT
Start: 2017-12-08 | End: 2017-12-20 | Stop reason: HOSPADM

## 2017-12-08 RX ORDER — SODIUM CHLORIDE 9 MG/ML
INJECTION, SOLUTION INTRAVENOUS
Status: DISCONTINUED | OUTPATIENT
Start: 2017-12-08 | End: 2017-12-12

## 2017-12-08 RX ORDER — MAGNESIUM SULFATE HEPTAHYDRATE 40 MG/ML
2 INJECTION, SOLUTION INTRAVENOUS ONCE
Status: COMPLETED | OUTPATIENT
Start: 2017-12-08 | End: 2017-12-08

## 2017-12-08 RX ORDER — HYDROCODONE BITARTRATE AND ACETAMINOPHEN 500; 5 MG/1; MG/1
TABLET ORAL
Status: DISCONTINUED | OUTPATIENT
Start: 2017-12-08 | End: 2017-12-12

## 2017-12-08 RX ADMIN — PANTOPRAZOLE SODIUM 40 MG: 40 TABLET, DELAYED RELEASE ORAL at 08:12

## 2017-12-08 RX ADMIN — HEPARIN SODIUM 5000 UNITS: 5000 INJECTION, SOLUTION INTRAVENOUS; SUBCUTANEOUS at 09:12

## 2017-12-08 RX ADMIN — HEPARIN SODIUM 5000 UNITS: 5000 INJECTION, SOLUTION INTRAVENOUS; SUBCUTANEOUS at 02:12

## 2017-12-08 RX ADMIN — OXYCODONE HYDROCHLORIDE 5 MG: 5 TABLET ORAL at 09:12

## 2017-12-08 RX ADMIN — LEVETIRACETAM 500 MG: 500 TABLET, FILM COATED ORAL at 08:12

## 2017-12-08 RX ADMIN — POLYETHYLENE GLYCOL 3350 17 G: 17 POWDER, FOR SOLUTION ORAL at 08:12

## 2017-12-08 RX ADMIN — SODIUM CHLORIDE 300 ML: 0.9 INJECTION, SOLUTION INTRAVENOUS at 05:12

## 2017-12-08 RX ADMIN — CEFEPIME HYDROCHLORIDE 1 G: 1 INJECTION, SOLUTION INTRAVENOUS at 09:12

## 2017-12-08 RX ADMIN — CYCLOSPORINE 125 MG: 100 CAPSULE, LIQUID FILLED ORAL at 07:12

## 2017-12-08 RX ADMIN — SODIUM BICARBONATE 650 MG TABLET 1300 MG: at 02:12

## 2017-12-08 RX ADMIN — LEVETIRACETAM 500 MG: 500 TABLET, FILM COATED ORAL at 09:12

## 2017-12-08 RX ADMIN — CYCLOSPORINE 100 MG: 100 CAPSULE, LIQUID FILLED ORAL at 08:12

## 2017-12-08 RX ADMIN — SODIUM BICARBONATE 650 MG TABLET 1300 MG: at 05:12

## 2017-12-08 RX ADMIN — ATOVAQUONE 1500 MG: 750 SUSPENSION ORAL at 08:12

## 2017-12-08 RX ADMIN — OXYCODONE HYDROCHLORIDE 10 MG: 5 TABLET ORAL at 07:12

## 2017-12-08 RX ADMIN — Medication 100 MG: at 10:12

## 2017-12-08 RX ADMIN — OXYCODONE HYDROCHLORIDE 5 MG: 5 TABLET ORAL at 11:12

## 2017-12-08 RX ADMIN — OXYCODONE HYDROCHLORIDE 10 MG: 5 TABLET ORAL at 11:12

## 2017-12-08 RX ADMIN — THERA TABS 1 TABLET: TAB at 08:12

## 2017-12-08 RX ADMIN — ALBUMIN (HUMAN) 25 G: 12.5 SOLUTION INTRAVENOUS at 10:12

## 2017-12-08 RX ADMIN — HEPARIN SODIUM 1000 UNITS: 1000 INJECTION, SOLUTION INTRAVENOUS; SUBCUTANEOUS at 06:12

## 2017-12-08 RX ADMIN — VALGANCICLOVIR 450 MG: 450 TABLET, FILM COATED ORAL at 07:12

## 2017-12-08 RX ADMIN — DOCUSATE SODIUM 100 MG: 100 CAPSULE, LIQUID FILLED ORAL at 09:12

## 2017-12-08 RX ADMIN — DOCUSATE SODIUM 100 MG: 100 CAPSULE, LIQUID FILLED ORAL at 05:12

## 2017-12-08 RX ADMIN — MAGNESIUM SULFATE IN WATER 2 G: 40 INJECTION, SOLUTION INTRAVENOUS at 10:12

## 2017-12-08 RX ADMIN — DOCUSATE SODIUM 100 MG: 100 CAPSULE, LIQUID FILLED ORAL at 02:12

## 2017-12-08 RX ADMIN — HEPARIN SODIUM 5000 UNITS: 5000 INJECTION, SOLUTION INTRAVENOUS; SUBCUTANEOUS at 05:12

## 2017-12-08 RX ADMIN — OXYCODONE HYDROCHLORIDE 10 MG: 5 TABLET ORAL at 04:12

## 2017-12-08 RX ADMIN — SODIUM BICARBONATE 650 MG TABLET 1300 MG: at 09:12

## 2017-12-08 RX ADMIN — URSODIOL 300 MG: 300 CAPSULE ORAL at 08:12

## 2017-12-08 RX ADMIN — URSODIOL 300 MG: 300 CAPSULE ORAL at 09:12

## 2017-12-08 NOTE — PROGRESS NOTES
Patient arrived on unit via stretcher. Report received from Khadijah Wagoner. Patient weight obtained via bed @ 74.4kg. Acute dialysis initiated via right Ij. Ports aspirated and flushed without difficulty. Lines connected and secured. Good blood flows established.

## 2017-12-08 NOTE — PROGRESS NOTES
[facility]  Liver Transplant  Progress Note    Patient Name: Jhonny Diana  MRN: [id number]  Admission Date: 12/3/2017  Hospital Length of Stay: 5 days  Code Status: Full Code  Primary Care Provider: Primary Doctor No  Post-Operative Day: 50    ORGAN:   LIVER  Disease Etiology: Acute Alcoholic Hepatitis  Donor Type:    - Brain Death  CDC High Risk:   No  Donor CMV Status:   Donor CMV Status: Positive  Donor HBcAB:   Negative  Donor HCV Status:   Negative  Whole or Partial: Whole Liver  Biliary Anastomosis: End to End  Arterial Anatomy: Standard  Subjective:     History of Present Illness:  Mr. Diana is a 26yo man w/a history of alcoholic cirrhosis s/p DDLT 10/19/2017; c/b seizures (swtiched off prograf to cyclo), ATN requiring HD (last HD ), superificial wound infection s/p wound vac to chevron incision, and recent admit for fevers on  (discharged on empiric augmentin for suspected superficial wound infection) and readmitted  again with fever. Found to have peritonitis (WBC 5000, 75% PNM) neg for bile leak. He was treated initially with vanc/cefepime. Repeat cell counts  with some improvement (WBC 1400, 45% PNM). Pt transitioned to augmentin upon d/c .      Pt presented to the ED this morning with subjective fever (100.5) and general malaise beginning at 8pm yesterday. His abdomen is soft, but diffusely tender. Labs reveal elevated WBC count to 12.7 from 6.5 in addition to elevated tbili 3.5 from 2.3 when compared to labs 3 days ago, despite being on augmentin for recent diagnosis of peritonitis. He denies nausea, vomiting, diarrhea, congestion, cough, CP or SOB. He has received 1 L bolus NS in the ED. Infectious mosley intiated, will start BS abx (vanc/cef) and obtain CT abdomen.     He has a wound vac in place.     Hospital Course:  Interval History:  Pt in slightly better spirits today, joking with team. Pt remains with abdominal pain which is improving as peritonitis  continues to resolve (last para ). Bili trending down at 5.2 today, cont ursodiol. PO intake remains poor, will place SHANE tube and initiate tube feeds today, orders placed. Will give albumin x1. Per nephrology will plan for HD today. H/H low on morning labs will plan for 1 unit PRBC with HD. Of note, TSH checked yesterday, level of 11.4 with T4 <3.0, endo consulted- will start levothyroxine today. Cont to encourage PO intake and ambulation. PT ordered 12/7/17, unable to see patient today - will work with pt this weekend. Monitor.     Scheduled Meds:   sodium chloride 0.9%   Intravenous Once    atovaquone  1,500 mg Oral Daily    ceFEPime (MAXIPIME) IVPB  1 g Intravenous Q24H    cycloSPORINE modified (NEORAL)  100 mg Oral BID    docusate sodium  100 mg Oral TID    ergocalciferol  50,000 Units Oral Q7 Days    heparin (porcine)  5,000 Units Subcutaneous Q8H    levETIRAcetam  500 mg Oral BID    [START ON 12/9/2017] levothyroxine  75 mcg Oral Before breakfast    multivitamin  1 tablet Oral Daily    pantoprazole  40 mg Oral Daily    polyethylene glycol  17 g Oral Daily    sodium bicarbonate  1,300 mg Oral TID    thiamine  100 mg Oral Daily    ursodiol  300 mg Oral BID    valGANciclovir  450 mg Oral Every Mon, Wed, Fri     Continuous Infusions:   PRN Meds:sodium chloride, sodium chloride 0.9%, sodium chloride 0.9%, acetaminophen, albumin human 25%, bisacodyl, midodrine, ondansetron, ondansetron, oxyCODONE, oxyCODONE, ramelteon, simethicone, sodium chloride 0.9%, sodium chloride 0.9%    Review of Systems   Constitutional: Positive for activity change, fatigue and fever. Negative for chills.   Respiratory: Negative for cough, shortness of breath and wheezing.    Cardiovascular: Positive for leg swelling.   Gastrointestinal: Positive for abdominal distention, abdominal pain and constipation. Negative for nausea and vomiting.   Genitourinary: Negative for difficulty urinating and dysuria.   Skin: Positive  for wound.   Allergic/Immunologic: Positive for immunocompromised state.   Neurological: Positive for weakness.   Psychiatric/Behavioral: Negative for confusion and decreased concentration. The patient is nervous/anxious.      Objective:     Vital Signs (Most Recent):  Temp: 99.1 °F (37.3 °C) (12/08/17 1202)  Pulse: 104 (12/08/17 1202)  Resp: 18 (12/08/17 1202)  BP: (!) 100/57 (12/08/17 1202)  SpO2: 100 % (12/08/17 1202) Vital Signs (24h Range):  Temp:  [98.2 °F (36.8 °C)-99.9 °F (37.7 °C)] 99.1 °F (37.3 °C)  Pulse:  [] 104  Resp:  [14-18] 18  SpO2:  [97 %-100 %] 100 %  BP: ()/(55-60) 100/57     Weight: 78.8 kg (173 lb 12.8 oz)  Body mass index is 28.05 kg/m².    Intake/Output - Last 3 Shifts       12/06 0700 - 12/07 0659 12/07 0700 - 12/08 0659 12/08 0700 - 12/09 0659    P.O. 930 790     I.V. (mL/kg)  100 (1.3)     Blood 250      Other 500 0     IV Piggyback 50 50     Total Intake(mL/kg) 1730 (21.3) 940 (11.9)     Urine (mL/kg/hr) 650 (0.3) 325 (0.2)     Emesis/NG output  0 (0)     Other 2500 (1.3) 50 (0)     Stool 0 (0) 0 (0)     Blood  0 (0)     Total Output 3150 375      Net -1420 +565             Urine Occurrence 0 x 4 x     Stool Occurrence 1 x 3 x     Emesis Occurrence  0 x           Physical Exam   Constitutional: He is oriented to person, place, and time. He appears well-developed and well-nourished. No distress.   HENT:   Head: Normocephalic and atraumatic.   Neck: Normal range of motion. Neck supple. No JVD present.   Cardiovascular: Regular rhythm and normal heart sounds.  Tachycardia present.    No murmur heard.  Pulmonary/Chest: Effort normal. No respiratory distress. He has decreased breath sounds in the right middle field, the right lower field and the left lower field. He has no wheezes. He exhibits no tenderness.   Abdominal: Soft. Bowel sounds are normal. He exhibits distension. There is tenderness (diffusely ). There is no rebound and no guarding.   Wound vac to incision    Musculoskeletal: Normal range of motion. He exhibits edema (2+ LE edema). He exhibits no tenderness.   Neurological: He is alert and oriented to person, place, and time. He has normal reflexes.   Skin: Skin is warm and dry. He is not diaphoretic.   Psychiatric: His behavior is normal. Judgment and thought content normal. His mood appears anxious.   Pt anxious and expressing feeling overwhelmed   Nursing note and vitals reviewed.      Laboratory:  Immunosuppressants         Stop Route Frequency     cycloSPORINE modified (NEORAL) capsule 100 mg      -- Oral 2 times daily        CBC:     Recent Labs  Lab 12/08/17  0553   WBC 4.22   RBC 2.43*   HGB 7.6*   HCT 23.1*      MCV 95   MCH 31.3*   MCHC 32.9     CMP:     Recent Labs  Lab 12/08/17  0553   GLU 74   CALCIUM 8.2*   ALBUMIN 2.1*   PROT 4.6*   *   K 3.5   CO2 21*      BUN 35*   CREATININE 3.9*   ALKPHOS 121   ALT 6*   AST 17   BILITOT 5.2*     Coagulation:     Recent Labs  Lab 12/08/17  0553   INR 1.2     Cardiac Markers: No results for input(s): CKMB, TROPONINT, MYOGLOBIN in the last 168 hours.  Labs within the past 24 hours have been reviewed.    Diagnostic Results:  I have personally reviewed all pertinent imaging studies.    Assessment/Plan:     * Fever    - Has now remained afebrile, tmax 99.9.   - Cont with BS antibxs for treatment.    -Pt to cont vanc/cefepime. Vanc held 12/6 due to high level. Will recheck levels post-HD today.    - ERCP unable to be performed 12/4. Successful ERCP 12/6/17 with stent placed.   - wound vac changed on 12/6/17.               Hyperbilirubinemia    - Elevated t bili post liver txp in setting of abdominal pain, decreased appetitive and intermittent nausea.  PBS consulted to assess duct per ERCP.  - ERCP performed today but PBS unable to cannulate. MRCP with stricture.  - ERCP 12/6/17 with post-anastamosis stricture with stent placed.   - Tbili peaked at 9.1, now trending down.   -started ursodiol 12/7/17.          LAURA (acute kidney injury)    - Cr level remains elevated  - Last HD 11/1 prior to admit. Pt still making urine.    -  Creatinine significantly elevated on 12/6 and HD started.    - Will assess daily need for HD. Appreciate Nephrology recs.   - patient with volume overload but low albumin.     - will give albumin x1   - 2D echo within normal limits  -H/H low, will replace with HD today  - Cont strict I/O's.           Liver transplanted    - A/w fevers and hyperbilirubinemia.   - Tbili with increase again this am. CT abdomen reviewed.  - LFTs stable.   - PBS consulted. ERCP attempted on 12/4 but unable to cannulate. MRCP on 12/4 noted with stricture.  - ERCP 12/6/17  With post-anastamosis stricture, stent placed.   - Para 12/5/17, 3L removed ( down from 1497, cr 5.2, bili 2.5)          Long-term use of immunosuppressant medication    Cont cyclo. Monitor for toxicities and adjust dose accordingly          Prophylactic immunotherapy    - See long term immunosuppressant          Delayed surgical wound healing    - Wound vac in place, last changed 12/2.  - wound care consulted for wound vac change 12/6/17.           Malnutrition    - Pt malnourished with poor PO intake  - albumin x1 today  - SHANE tube placed today, tube feeds ordered to start 12/8/17          Peritonitis    - Diagnosed on previous admission.  Last para 11/29 with improving cell counts WBC 1400 with 45% PNMs   Was d/c'ed on augmentin, will switch to vanc/cef (see leukocytosis)   - Repeat Paracentesis 12/5/17, with  (improving from 1497)            Anemia of chronic disease    - H/H decreased today   - plan for 1 unit PRBC with HD 12/8/17  - Received 1 unit PRBC 12/5/17  - Monitor CBC daily.           Hypothyroid    -TSH 11.4  -T4 ,3.0  - Endo consulted. Start levothyroxine 75mcg PO. Instructions for administration given. Appreciate recs.           Leukocytosis    - Now improved.   - WBC elevated to 12.7 from 6.5 on admit. Subjective fever  100.5 at home. Recent dx peritonitis on augmentin outpt, now placed on BS antibxs empirically.   - Infectious mosley initiated  - CT abdomen reviewed.    -Para 12/5/17,  improving from ~1500.         Seizure    - Cont keppra              VTE Risk Mitigation         Ordered     heparin (porcine) injection 5,000 Units  Every 8 hours     Route:  Subcutaneous        12/04/17 0933     Medium Risk of VTE  Once      12/03/17 0249     Place sequential compression device  Until discontinued      12/03/17 0249          The patients clinical status was discussed at multidisplinary rounds, involving transplant surgery, transplant medicine, pharmacy, nursing, nutrition, and social work    Discharge Planning: Not a candidate for d/c at this time. To remain inpatient while awaiting improvement in Tbili, managing renal function, treating peritonitis, and managing malnutrition and deconditioning.   Monitor kidney function --> may need outpt HD  Monitor HH needs vs rehab for deconditioned status      Fransisca Thomas PA-C  Liver Transplant  [facility]

## 2017-12-08 NOTE — PLAN OF CARE
Problem: Patient Care Overview  Goal: Plan of Care Review  Outcome: Ongoing (interventions implemented as appropriate)  AOx4, VSS, C/O pain 7/10 - PRN medication administered, Relief obtained. Pt tearful and grimacing d/t pain. Wound vac maintained @ 125 mmHg, serous output in chamber. Chevron dressing CDI. Appetite remains poor. ABX therapy maintained. NS on tele. Mother remains at bedside. Plan of care reviewed with pt and mother. Remains free from falls. Bed locked and low. Personal items and call light within reach. NAD, WCTM.

## 2017-12-08 NOTE — ASSESSMENT & PLAN NOTE
- On initial presentation TSH 11.467; T4 Total <3.0; Free T4 0.53  - DDx includes primary hypothyroidism vs non-thyroidal illness  - Repeat TFTs significant for TSH 17.512, T4, Free 0.66 following 3 days of treatment with IV levothyroxine.  - TPO antibody negative  - Ordered Reverse T3, results pending  - Would recommend continued treatment with levothyroxine 50 mcg IV QD as pt likely to have decrease absorption given anasarca and given his need for tube feeds at present  - Once pt able to take PO, may transition to equivalent PO dose of levothyroxine 75 mcg QD to be given 1 hour prior to breakfast/ AM tube feed  - Will order repeat TSH and Free T4 on 12/15  - Will titrate dose based on lab trends

## 2017-12-08 NOTE — SUBJECTIVE & OBJECTIVE
Scheduled Meds:   sodium chloride 0.9%   Intravenous Once    atovaquone  1,500 mg Oral Daily    ceFEPime (MAXIPIME) IVPB  1 g Intravenous Q24H    cycloSPORINE modified (NEORAL)  100 mg Oral BID    docusate sodium  100 mg Oral TID    ergocalciferol  50,000 Units Oral Q7 Days    heparin (porcine)  5,000 Units Subcutaneous Q8H    levETIRAcetam  500 mg Oral BID    [START ON 12/9/2017] levothyroxine  75 mcg Oral Before breakfast    multivitamin  1 tablet Oral Daily    pantoprazole  40 mg Oral Daily    polyethylene glycol  17 g Oral Daily    sodium bicarbonate  1,300 mg Oral TID    thiamine  100 mg Oral Daily    ursodiol  300 mg Oral BID    valGANciclovir  450 mg Oral Every Mon, Wed, Fri     Continuous Infusions:   PRN Meds:sodium chloride, sodium chloride 0.9%, sodium chloride 0.9%, acetaminophen, albumin human 25%, bisacodyl, midodrine, ondansetron, ondansetron, oxyCODONE, oxyCODONE, ramelteon, simethicone, sodium chloride 0.9%, sodium chloride 0.9%    Review of Systems   Constitutional: Positive for activity change, fatigue and fever. Negative for chills.   Respiratory: Negative for cough, shortness of breath and wheezing.    Cardiovascular: Positive for leg swelling.   Gastrointestinal: Positive for abdominal distention, abdominal pain and constipation. Negative for nausea and vomiting.   Genitourinary: Negative for difficulty urinating and dysuria.   Skin: Positive for wound.   Allergic/Immunologic: Positive for immunocompromised state.   Neurological: Positive for weakness.   Psychiatric/Behavioral: Negative for confusion and decreased concentration. The patient is nervous/anxious.      Objective:     Vital Signs (Most Recent):  Temp: 99.1 °F (37.3 °C) (12/08/17 1202)  Pulse: 104 (12/08/17 1202)  Resp: 18 (12/08/17 1202)  BP: (!) 100/57 (12/08/17 1202)  SpO2: 100 % (12/08/17 1202) Vital Signs (24h Range):  Temp:  [98.2 °F (36.8 °C)-99.9 °F (37.7 °C)] 99.1 °F (37.3 °C)  Pulse:  [] 104  Resp:   [14-18] 18  SpO2:  [97 %-100 %] 100 %  BP: ()/(55-60) 100/57     Weight: 78.8 kg (173 lb 12.8 oz)  Body mass index is 28.05 kg/m².    Intake/Output - Last 3 Shifts       12/06 0700 - 12/07 0659 12/07 0700 - 12/08 0659 12/08 0700 - 12/09 0659    P.O. 930 790     I.V. (mL/kg)  100 (1.3)     Blood 250      Other 500 0     IV Piggyback 50 50     Total Intake(mL/kg) 1730 (21.3) 940 (11.9)     Urine (mL/kg/hr) 650 (0.3) 325 (0.2)     Emesis/NG output  0 (0)     Other 2500 (1.3) 50 (0)     Stool 0 (0) 0 (0)     Blood  0 (0)     Total Output 3150 375      Net -1420 +565             Urine Occurrence 0 x 4 x     Stool Occurrence 1 x 3 x     Emesis Occurrence  0 x           Physical Exam   Constitutional: He is oriented to person, place, and time. He appears well-developed and well-nourished. No distress.   HENT:   Head: Normocephalic and atraumatic.   Neck: Normal range of motion. Neck supple. No JVD present.   Cardiovascular: Regular rhythm and normal heart sounds.  Tachycardia present.    No murmur heard.  Pulmonary/Chest: Effort normal. No respiratory distress. He has decreased breath sounds in the right middle field, the right lower field and the left lower field. He has no wheezes. He exhibits no tenderness.   Abdominal: Soft. Bowel sounds are normal. He exhibits distension. There is tenderness (diffusely ). There is no rebound and no guarding.   Wound vac to incision   Musculoskeletal: Normal range of motion. He exhibits edema (2+ LE edema). He exhibits no tenderness.   Neurological: He is alert and oriented to person, place, and time. He has normal reflexes.   Skin: Skin is warm and dry. He is not diaphoretic.   Psychiatric: His behavior is normal. Judgment and thought content normal. His mood appears anxious.   Pt anxious and expressing feeling overwhelmed   Nursing note and vitals reviewed.      Laboratory:  Immunosuppressants         Stop Route Frequency     cycloSPORINE modified (NEORAL) capsule 100 mg       -- Oral 2 times daily        CBC:     Recent Labs  Lab 12/08/17  0553   WBC 4.22   RBC 2.43*   HGB 7.6*   HCT 23.1*      MCV 95   MCH 31.3*   MCHC 32.9     CMP:     Recent Labs  Lab 12/08/17  0553   GLU 74   CALCIUM 8.2*   ALBUMIN 2.1*   PROT 4.6*   *   K 3.5   CO2 21*      BUN 35*   CREATININE 3.9*   ALKPHOS 121   ALT 6*   AST 17   BILITOT 5.2*     Coagulation:     Recent Labs  Lab 12/08/17  0553   INR 1.2     Cardiac Markers: No results for input(s): CKMB, TROPONINT, MYOGLOBIN in the last 168 hours.  Labs within the past 24 hours have been reviewed.    Diagnostic Results:  I have personally reviewed all pertinent imaging studies.

## 2017-12-08 NOTE — PLAN OF CARE
Problem: Patient Care Overview  Goal: Plan of Care Review  Patient AAO but delayed today.  Patients mother at the bedside attentive to patient.  T bili improving, creatinine remains elevated.  Dialysis today.  H/H low, will receive one unit PRBC in dialysis.  T max 99.9,  BLE reddenedGURMEET aware.  Patient complains of incisional pain, 10mg oxycodone received once today.  Patient is a one person assist with walker.  Scant serous fluid noted to wound vac.  Albumen received today, Abd distended.  SHANE placed today, tube feedings will be started tonight when patient returns from dialysis.  Appetite very poor, oral intake encouraged.

## 2017-12-08 NOTE — ASSESSMENT & PLAN NOTE
- Cr level remains elevated  - Last HD 11/1 prior to admit. Pt still making urine.    -  Creatinine significantly elevated on 12/6 and HD started.    - Will assess daily need for HD. Appreciate Nephrology recs.   - patient with volume overload but low albumin.     - will give albumin x1   - 2D echo within normal limits  -H/H low, will replace with HD today  - Cont strict I/O's.

## 2017-12-08 NOTE — ASSESSMENT & PLAN NOTE
-TSH 11.4  -T4 ,3.0  - Endo consulted. Start levothyroxine 75mcg PO. Instructions for administration given. Appreciate recs.

## 2017-12-08 NOTE — PROGRESS NOTES
[facility]  Nephrology  Progress Note    Patient Name: Jhonny Diana  MRN: [id number]  Admission Date: 12/3/2017  Hospital Length of Stay: 5 days  Attending Provider: Solis Chapa MD   Primary Care Physician: Primary Doctor No  Principal Problem:Fever    Subjective:     HPI: Mr. Diana is a 26yo man w/a history of alcoholic cirrhosis s/p DDLT 10/19/2017; c/b seizures (swtiched off prograf to cyclo), ATN requiring HD (last HD 11/17), superificial wound infection s/p wound vac to chevron incision, and recent admit for fevers on 11/21 (discharged on empiric augmentin for suspected superficial wound infection) and readmitted 11/24 again with fever. Found to have peritonitis (WBC 5000, 75% PNM) neg for bile leak. He was treated initially with vanc/cefepime. Repeat cell counts 11/29 with some improvement (WBC 1400, 45% PNM). Pt transitioned to augmentin upon d/c 11/30.      Pt presented to the ED this morning with subjective fever (100.5) and general malaise beginning at 8pm yesterday. His abdomen is soft, but diffusely tender. Labs reveal elevated WBC count to 12.7 from 6.5 in addition to elevated tbili 3.5 from 2.3 when compared to labs 3 days ago, despite being on augmentin for recent diagnosis of peritonitis. He denies nausea, vomiting, diarrhea, congestion, cough, CP or SOB. He has received 1 L bolus NS in the ED.    Interval History: NAEON Improved BB to 5.2. S/p ERCP with stent placed. Plan for I unit transfusion PRBC.  ml overnight. Net gain 150 ml/24hrs    Review of patient's allergies indicates:   Allergen Reactions    Bactrim [sulfamethoxazole-trimethoprim] Other (See Comments)     Mookie Trell Syndrome     Current Facility-Administered Medications   Medication Frequency    0.9%  NaCl infusion (for blood administration) Q24H PRN    0.9%  NaCl infusion PRN    0.9%  NaCl infusion PRN    0.9%  NaCl infusion Once    acetaminophen tablet 650 mg Q8H PRN    albumin human 25% bottle 25 g  PRN    atovaquone suspension 1,500 mg Daily    bisacodyl suppository 10 mg Daily PRN    cefepime in dextrose 5 % 1 gram/50 mL IVPB 1 g Q24H    cycloSPORINE modified capsule 125 mg BID    docusate sodium capsule 100 mg TID    ergocalciferol capsule 50,000 Units Q7 Days    heparin (porcine) injection 5,000 Units Q8H    levETIRAcetam tablet 500 mg BID    [START ON 12/9/2017] levothyroxine tablet 75 mcg Before breakfast    midodrine tablet 5 mg PRN    multivitamin tablet 1 tablet Daily    ondansetron disintegrating tablet 8 mg Q8H PRN    ondansetron injection 4 mg Q8H PRN    oxyCODONE immediate release tablet 10 mg Q4H PRN    oxyCODONE immediate release tablet 5 mg Q4H PRN    pantoprazole EC tablet 40 mg Daily    polyethylene glycol packet 17 g Daily    ramelteon tablet 8 mg Nightly PRN    simethicone chewable tablet 80 mg TID PRN    sodium bicarbonate tablet 1,300 mg TID    sodium chloride 0.9% flush 3 mL PRN    sodium chloride 0.9% flush 3 mL PRN    thiamine tablet 100 mg Daily    ursodiol capsule 300 mg BID    valGANciclovir tablet 450 mg Every Mon, Wed, Fri       Objective:     Vital Signs (Most Recent):  Temp: 99.1 °F (37.3 °C) (12/08/17 1714)  Pulse: 104 (12/08/17 1714)  Resp: 20 (12/08/17 1659)  BP: (!) 115/57 (12/08/17 1714)  SpO2: 100 % (12/08/17 1202)  O2 Device (Oxygen Therapy): room air (12/08/17 1659) Vital Signs (24h Range):  Temp:  [98.9 °F (37.2 °C)-99.9 °F (37.7 °C)] 99.1 °F (37.3 °C)  Pulse:  [] 104  Resp:  [16-20] 20  SpO2:  [97 %-100 %] 100 %  BP: ()/(49-71) 115/57     Weight: 78.8 kg (173 lb 12.8 oz) (12/08/17 0415)  Body mass index is 28.05 kg/m².  Body surface area is 1.92 meters squared.    I/O last 3 completed shifts:  In: 1920 [P.O.:1220; I.V.:100; Other:500; IV Piggyback:100]  Out: 3250 [Urine:700; Other:2550]    Physical Exam   Constitutional: He is oriented to person, place, and time. He has a sickly appearance. No distress. Nasal cannula in place.    HENT:   Head: Normocephalic and atraumatic.   Eyes: Pupils are equal, round, and reactive to light. Scleral icterus is present.   Neck: Normal range of motion. Neck supple. No JVD present.   Cardiovascular: Regular rhythm.  Tachycardia present.  Exam reveals no gallop and no friction rub.    No murmur heard.  Pulmonary/Chest: Effort normal. He has decreased breath sounds in the right lower field and the left lower field. He has no wheezes. He has no rales.   Abdominal: Soft. He exhibits distension (positive asitis ). There is no tenderness. There is no guarding.   Wound vac to incision. Positive ascitis    Musculoskeletal: He exhibits edema (2+ pitting edema in LE ).   Neurological: He is alert and oriented to person, place, and time.   Positive Asterixis, somnolent and nauseated   Skin: Skin is warm and dry. Capillary refill takes less than 2 seconds. He is not diaphoretic.   Psychiatric: His behavior is normal. He exhibits a depressed mood.   Nursing note and vitals reviewed.      Significant Labs:  ABGs: No results for input(s): PH, PCO2, HCO3, POCSATURATED, BE in the last 168 hours.  BMP:     Recent Labs  Lab 12/08/17  0553   GLU 74      CO2 21*   BUN 35*   CREATININE 3.9*   CALCIUM 8.2*   MG 1.6     CBC:     Recent Labs  Lab 12/08/17  0553   WBC 4.22   RBC 2.43*   HGB 7.6*   HCT 23.1*      MCV 95   MCH 31.3*   MCHC 32.9     CMP:     Recent Labs  Lab 12/08/17  0553   GLU 74   CALCIUM 8.2*   ALBUMIN 2.1*   PROT 4.6*   *   K 3.5   CO2 21*      BUN 35*   CREATININE 3.9*   ALKPHOS 121   ALT 6*   AST 17   BILITOT 5.2*     All labs within the past 24 hours have been reviewed.     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 He was admitted for peritonitis from 11/24-11/30 for peritonitis.      Plan:   - LAURA on CKD 4 (baseline sCr 3.9 - 4.0). Likely instrinsic etiology, multifactorial secondary to sepsis vs. Toxins  - Cont sodium biacarbonate 1,300 mg q TID for acidosis. Please.   - HD x 3 hrs for metabolic clearance and volume management  - Target 1 lt as tolerated keep MAP > 65  - would recommend to maintain Hg > 8 for improved chance on renal recovery.  - Avoid nephrotoxic medication, renal dose medications and avoid contrast   - Renal diet            Thank you for your consult. I will follow-up with patient. Please contact us if you have any additional questions.    Krish Frausto MD  Nephrology  [facility]    ATTENDING PHYSICIAN ATTESTATION  I have personally interviewed and examined the patient. I thoroughly reviewed the demographic, clinical, laboratorial and imaging information available in medical records. I agree with the assessment and recommendations provided by the subspecialty resident Dr. Frausto who was under my supervision

## 2017-12-08 NOTE — PLAN OF CARE
Problem: Liver Transplant (Adult)  Intervention: Monitor/Manage Nutrition Support  Recommendation/Intervention:   Start ordered TF of Novasource Renal at 10mL and increase by 10mL q4hrs until goal of 40mL/hr is reached. This will provide 1920 kcal, 87g protein, and 688mL free fluid. Flushes per MD 2' HD. Hold for residuals >400mL.      Continue oral renal diet with Novasource TID, encouraging intake as tolerated. RD following.      Goals: Adequate nutrition PO vs EN  Nutrition Goal Status: progressing towards goal  Communication of RD Recs: discussed on rounds

## 2017-12-08 NOTE — PROGRESS NOTES
[facility]  Nephrology  Progress Note    Patient Name: Jhonny Diana  MRN: [id number]  Admission Date: 12/3/2017  Hospital Length of Stay: 4 days  Attending Provider: Solis Chapa MD   Primary Care Physician: Primary Doctor No  Principal Problem:Fever    Subjective:     HPI: Mr. Diana is a 28yo man w/a history of alcoholic cirrhosis s/p DDLT 10/19/2017; c/b seizures (swtiched off prograf to cyclo), ATN requiring HD (last HD 11/17), superificial wound infection s/p wound vac to chevron incision, and recent admit for fevers on 11/21 (discharged on empiric augmentin for suspected superficial wound infection) and readmitted 11/24 again with fever. Found to have peritonitis (WBC 5000, 75% PNM) neg for bile leak. He was treated initially with vanc/cefepime. Repeat cell counts 11/29 with some improvement (WBC 1400, 45% PNM). Pt transitioned to augmentin upon d/c 11/30.      Pt presented to the ED this morning with subjective fever (100.5) and general malaise beginning at 8pm yesterday. His abdomen is soft, but diffusely tender. Labs reveal elevated WBC count to 12.7 from 6.5 in addition to elevated tbili 3.5 from 2.3 when compared to labs 3 days ago, despite being on augmentin for recent diagnosis of peritonitis. He denies nausea, vomiting, diarrhea, congestion, cough, CP or SOB. He has received 1 L bolus NS in the ED.    Interval History: NAEON Afebrile. MS better this am, ambulating the hals with walker. More awake and quicker to respond. S/p ERCP with stent placed and improving BB. Hg dropped from. 9.1 to 7.9. Tolerated HD yesterday with UF 2000 ml.  ml overnight.    Review of patient's allergies indicates:   Allergen Reactions    Bactrim [sulfamethoxazole-trimethoprim] Other (See Comments)     Mookie Trell Syndrome     Current Facility-Administered Medications   Medication Frequency    0.9%  NaCl infusion PRN    0.9%  NaCl infusion Once    acetaminophen tablet 650 mg Q8H PRN    albumin  human 25% bottle 25 g PRN    albumin human 25% bottle 25 g BID    atovaquone suspension 1,500 mg Daily    bisacodyl suppository 10 mg Daily PRN    cefepime in dextrose 5 % 1 gram/50 mL IVPB 1 g Q24H    cycloSPORINE modified (NEORAL) capsule 100 mg BID    docusate sodium capsule 100 mg TID    ergocalciferol capsule 50,000 Units Q7 Days    heparin (porcine) injection 5,000 Units Q8H    levETIRAcetam tablet 500 mg BID    midodrine tablet 5 mg PRN    multivitamin tablet 1 tablet Daily    ondansetron disintegrating tablet 8 mg Q8H PRN    ondansetron injection 4 mg Q8H PRN    oxyCODONE immediate release tablet 10 mg Q4H PRN    oxyCODONE immediate release tablet 5 mg Q4H PRN    pantoprazole EC tablet 40 mg Daily    polyethylene glycol packet 17 g Daily    ramelteon tablet 8 mg Nightly PRN    simethicone chewable tablet 80 mg TID PRN    sodium bicarbonate tablet 1,300 mg TID    sodium chloride 0.9% flush 3 mL PRN    sodium chloride 0.9% flush 3 mL PRN    thiamine tablet 100 mg Daily    ursodiol capsule 300 mg BID    valGANciclovir tablet 450 mg Every Mon, Wed, Fri       Objective:     Vital Signs (Most Recent):  Temp: 99.5 °F (37.5 °C) (12/07/17 1253)  Pulse: 98 (12/07/17 1253)  Resp: 18 (12/07/17 1253)  BP: (!) 98/56 (12/07/17 1253)  SpO2: 100 % (12/07/17 1253)  O2 Device (Oxygen Therapy): room air (12/07/17 1253) Vital Signs (24h Range):  Temp:  [96.5 °F (35.8 °C)-99.5 °F (37.5 °C)] 99.5 °F (37.5 °C)  Pulse:  [] 98  Resp:  [16-20] 18  SpO2:  [100 %] 100 %  BP: ()/(54-78) 98/56     Weight: 81.1 kg (178 lb 14.4 oz) (12/04/17 0515)  Body mass index is 28.88 kg/m².  Body surface area is 1.94 meters squared.    I/O last 3 completed shifts:  In: 2120 [P.O.:1270; Blood:250; Other:500; IV Piggyback:100]  Out: 3835 [Urine:1335; Other:2500]    Physical Exam   Constitutional: He is oriented to person, place, and time. He has a sickly appearance. No distress. Nasal cannula in place.   Pleasantly  disoriented with constant reassurance    HENT:   Head: Normocephalic and atraumatic.   Eyes: Pupils are equal, round, and reactive to light. Scleral icterus is present.   Neck: Normal range of motion. Neck supple. No JVD present.   Cardiovascular: Regular rhythm.  Tachycardia present.  Exam reveals no gallop and no friction rub.    No murmur heard.  Pulmonary/Chest: Effort normal. He has decreased breath sounds in the right lower field and the left lower field. He has no wheezes. He has no rales.   Abdominal: Soft. He exhibits distension (positive asitis much improved post paracentesis less tense). There is no tenderness (2+ pitting edema in LE ). There is no guarding.   Wound vac to incision. Positive ascitis    Musculoskeletal: He exhibits edema (2+ pitting edema in LE ).   Neurological: He is alert and oriented to person, place, and time.   Positive Asterixis, somnolent and nauseated   Skin: Skin is warm and dry. Capillary refill takes less than 2 seconds. He is not diaphoretic.   Psychiatric: His behavior is normal. He exhibits a depressed mood.   Nursing note and vitals reviewed.      Significant Labs:  ABGs: No results for input(s): PH, PCO2, HCO3, POCSATURATED, BE in the last 168 hours.  BMP:     Recent Labs  Lab 12/07/17  0525   GLU 60*      CO2 21*   BUN 29*   CREATININE 3.3*   CALCIUM 8.1*   MG 1.7     CBC:     Recent Labs  Lab 12/07/17  0525   WBC 4.43   RBC 2.53*   HGB 7.9*   HCT 23.1*      MCV 91   MCH 31.2*   MCHC 34.2     CMP:     Recent Labs  Lab 12/07/17  0525   GLU 60*   CALCIUM 8.1*   ALBUMIN 1.7*   PROT 4.5*      K 3.3*   CO2 21*      BUN 29*   CREATININE 3.3*   ALKPHOS 135   ALT 5*   AST 19   BILITOT 6.0*     All labs within the past 24 hours have been reviewed.     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 He was admitted for peritonitis from 11/24-11/30 for peritonitis.     Plan:   - LAURA on CKD 4 (baseline sCr 3.9 - 4.0). Likely instrinsic etiology, multifactorial secondary to sepsis vs. Toxins  - Cont sodium biacarbonate 1,300 mg q TID for acidosis. Please.   - Tolerated HD well without complications.   - Strict inatake and output, UO overnight since admission.  - no need for RRT today, optimistic renal recovery as still making urine and now BB improving  - would recommend to maintain Hg > 8 for improved chance on renal recovery.  - Avoid nephrotoxic medication, renal dose medications and avoid contrast   - Renal diet            Thank you for your consult. I will follow-up with patient. Please contact us if you have any additional questions.    Krish Frausto MD  Nephrology  [facility]    ATTENDING PHYSICIAN ATTESTATION  I have personally interviewed and examined the patient. I thoroughly reviewed the demographic, clinical, laboratorial and imaging information available in medical records. I agree with the assessment and recommendations provided by the subspecialty resident Dr. Frausto who was under my supervision

## 2017-12-08 NOTE — ASSESSMENT & PLAN NOTE
- Has now remained afebrile, tmax 99.9.   - Cont with BS antibxs for treatment.    -Pt to cont vanc/cefepime. Vanc held 12/6 due to high level. Will recheck levels post-HD today.    - ERCP unable to be performed 12/4. Successful ERCP 12/6/17 with stent placed.   - wound vac changed on 12/6/17.

## 2017-12-08 NOTE — HPI
26 yo M with HX of alcoholic cirrhosis s/p DDLT 10/19/2017, complicated by ATN requiring HD, superificial wound infection s/p wound vac placement, peritonitis negative for bile leak, and most recently admitted with abdominal pain, fever, hyperbilirubinemia, and leukocytosis s/p ERCP 12/6/17 significant for post-anastamosis stricture; stent placed.     Endocrinology was consulted regarding hypothyroidism noted on recent labs. Pt denies any prior history of thyroid disorders.  Family history significant for maternal grandmother with thyroid disease and subclinical hypothyroid disease in both mother and sister not requiring treatment.  Pt denies any medication history of amiodarone or lithium use. He denies any hair loss, dry skin, brittle nails, cold intolerance, or hoaseness. He endorses complaints of fatigue, SOB, and constipation, however, notes that these symptoms can also likely be attributed to complications associated with his liver disease and ascites. Pt notes his SOB improved with paracentesis 2 days ago.

## 2017-12-08 NOTE — ASSESSMENT & PLAN NOTE
- Pt malnourished with poor PO intake  - albumin x1 today  - SHANE tube placed today, tube feeds ordered to start 12/8/17

## 2017-12-08 NOTE — ASSESSMENT & PLAN NOTE
- TSH 11.467; T4 Total <3.0; Free T4 0.53  - DDx includes primary hypothyroidism vs euthyroid sick syndrome  - No previous hx of hypothyroidism, however positive family hx (maternal grandmother).   - Pt does endorse sxs of fatigue, constipation, and SOB, however these sxs may also be attributed to patient's other comorbidities. No overt signs of hypothyroidism.   - No baseline thyroid labs available.  - Will order TPO antibody and Reverse T3 labs  - Would recommend treating with levothyroxine 50 mcg IV QD as pt likely to have decrease absorption given anasarca and given his need for tube feeds at present  - Once pt able to take PO, may transition to equivalent PO dose of levothyroxine 75 mcg QD   - Will order repeat TSH and Free T4 on 12/11  - Will titrate dose based on lab trends

## 2017-12-08 NOTE — ASSESSMENT & PLAN NOTE
- H/H decreased today   - plan for 1 unit PRBC with HD 12/8/17  - Received 1 unit PRBC 12/5/17  - Monitor CBC daily.

## 2017-12-08 NOTE — PROGRESS NOTES
"Notified GURMEET ABARCA of the following:  Temp 99.9, BP low.   Patient states " I feel fine."  Blood ordered but will hold until decision on if dialysis will be ordered.  Will continue to monitor.  "

## 2017-12-08 NOTE — SUBJECTIVE & OBJECTIVE
Interval HPI:   Overnight events: NAEON.     PMH, PSH, FH, SH updated and reviewed     ROS:      Review of Systems   Constitutional: Positive for fatigue. Negative for chills and fever.   Eyes: Negative for visual disturbance.   Respiratory: Positive for shortness of breath.    Cardiovascular: Positive for leg swelling. Negative for chest pain and palpitations.   Gastrointestinal: Positive for abdominal distention, abdominal pain and constipation.   Endocrine: Negative for cold intolerance and heat intolerance.   Skin: Positive for wound. Negative for rash.   Neurological: Negative for dizziness and headaches.   Psychiatric/Behavioral: Negative for confusion.       Labs Reviewed and Include:  BASELINE Creatinine:   [unfilled]  [unfilled]  [unfilled]    Recent Labs  Lab 12/08/17  0553   GLU 74   CALCIUM 8.2*   ALBUMIN 2.1*   PROT 4.6*   *   K 3.5   CO2 21*      BUN 35*   CREATININE 3.9*   ALKPHOS 121   ALT 6*   AST 17   BILITOT 5.2*     Lab Results   Component Value Date    HGBA1C <4.0 (A) 10/18/2017       Nutritional status:   Body mass index is 28.05 kg/m².  Lab Results   Component Value Date    ALBUMIN 2.1 (L) 12/08/2017    ALBUMIN 1.7 (L) 12/07/2017    ALBUMIN 2.1 (L) 12/06/2017     Lab Results   Component Value Date    PREALBUMIN 5 (L) 10/11/2017       Estimated Creatinine Clearance: 27.8 mL/min (based on SCr of 3.9 mg/dL (H)).    POCT Glucose results:    Current Insulin Regimen:           PHYSICAL EXAMINATION:Vitals:    12/08/17 0839   BP: (!) 96/55   Pulse: 109   Resp: 16   Temp: 99.9 °F (37.7 °C)     Body mass index is 28.05 kg/m².    Physical Exam   Constitutional: He is oriented to person, place, and time. He appears well-developed. No distress.   HENT:   Head: Normocephalic and atraumatic.   NG tube in place   Eyes: EOM are normal. Pupils are equal, round, and reactive to light.   Neck: Normal range of motion. Neck supple. No thyromegaly present.   Cardiovascular: Regular rhythm.  Tachycardia  present.    Pulmonary/Chest: Effort normal. No respiratory distress.   Abdominal: He exhibits distension. There is tenderness.   Wound vac in place   Musculoskeletal: He exhibits edema (b/l LE pitting). He exhibits no deformity.   Neurological: He is oriented to person, place, and time.   Skin: Skin is warm and dry. He is not diaphoretic.

## 2017-12-08 NOTE — ASSESSMENT & PLAN NOTE
Nutrition Diagnosis:  Inadequate energy intake    Related to (etiology):   Poor appetite 2' ascites and abdominal distention, poor food choices    Signs and Symptoms (as evidenced by):   Pt consuming ~0-25% EEN and EPN, TF to be started today    Interventions/Recommendations (treatment strategy):  See recs    Nutrition Diagnosis Status:   Continues

## 2017-12-08 NOTE — PROGRESS NOTES
EZEKIEL received a return phone call from [place] the  with [facility] today ([phone]).  Chano reports that patient was discharged from there facility and if he requires acute hemodialysis again, a new referral for readmission will need to be sent to [facility] (ph#[phone], f [phone]).   EZEKIEL faxed needed documents for referral to [facility] today.  Per team, pt will likely be hospitalized for another week, as he is now getting tube feeds for better nutrition.   Pt coping better today, in better spirits and joking some with team regarding hospitalization.   Pt and pts mom deny any psychosocial concerns at this time.   EZEKIEL remains available for all transplant resources, education and psychosocial support and assist with all dc planning needs.

## 2017-12-08 NOTE — PROGRESS NOTES
[facility]  Adult Nutrition  Progress Note    SUMMARY     Recommendations    Recommendation/Intervention:   Start ordered TF of Novasource Renal at 10mL and increase by 10mL q4hrs until goal of 40mL/hr is reached. This will provide 1920 kcal, 87g protein, and 688mL free fluid. Flushes per MD 2' HD. Hold for residuals >400mL.     Continue oral renal diet with Novasource TID, encouraging intake as tolerated. RD following.     Goals: Adequate nutrition PO vs EN  Nutrition Goal Status: progressing towards goal  Communication of RD Recs: discussed on rounds      Reason for Assessment    Reason for Assessment: RD follow-up  Diagnosis: transplant/postoperative complications (peritonitis)  Relevent Medical History: OLTx 10/19/2017, complicated post-op w/ seizures and LAURA, last HD 11/17, cirrhosis 2' EtOH   Interdisciplinary Rounds: attended     General Information Comments: PO intake remains negligible since admit, Mom concerned for malnutrition and wanted TF yesterday; NGT dropped today, TF ordered, pt educated on ability to continue to consume oral diet as tolerated, TF to meet 100% needs    Nutrition Discharge Planning: Adequate PO intake    Nutrition Prescription Ordered    Current Diet Order: Renal  Nutrition Order Comments: On and off Renal/NPO  Current Nutrition Support Formula Ordered: Novasource Renal  Current Nutrition Support Rate Ordered: 40 (ml)  Current Nutrition Support Frequency Ordered: mL/hr  Oral Nutrition Supplement: Novasource Renal ONS     Evaluation of Received Nutrients/Fluid Intake    Enteral Calories (kcal): 1920 (ordered, but not yet started)  Enteral Protein (gm): 87  Enteral (Free Water) Fluid (mL): 688         Energy Calories Required: not meeting needs  % Kcal Needs: 100      Protein Required: not meeting needs  % Protein Needs: 90       I/O: +2L since admit       Fluid Required: not meeting needs  Comments: LBM 12/7     % Intake of Estimated Energy Needs: 75 - 100 %  %  "Meal Intake: 0%     Nutrition Risk Screen     Nutrition Risk Screen: large or nonhealing wound, burn or pressure ulcer    Nutrition/Diet History    Patient Reported Diet/Restrictions/Preferences: renal  Typical Food/Fluid Intake: Pt eats 2 meals per day, at eats light at those times due to abdominal fullness  Food Preferences: No cultural or Caodaism food preferences noted        Factors Affecting Nutritional Intake: abdominal distention, nausea/vomiting, NPO, early satiety        Labs/Tests/Procedures/Meds     Pertinent Labs Reviewed: reviewed  Pertinent Labs Comments: Na 135, BUN 35, Cr 3.9, gfr 19.7  Pertinent Medications Reviewed: reviewed  Pertinent Medications Comments: docusate, heparin, ergocalciferol, MVI, thiamin    Physical Findings    Overall Physical Appearance: generalized wasting, loss of subcutaneous fat, loss of muscle mass, edematous, weak  Tubes: nasogastric tube  Oral/Mouth Cavity: WDL  Skin: intact, jaundice    Anthropometrics    Temp: 99.9 °F (37.7 °C)     Height: 5' 6" (167.6 cm)  Weight Method: Bed Scale  Weight: 78.8 kg (173 lb 12.8 oz)  Ideal Body Weight (IBW), Male: 142 lb     % Ideal Body Weight, Male (lb): 123.24 lb     BMI (Calculated): 28.3  BMI Grade: 25 - 29.9 - overweight     Usual Body Weight (UBW), kg:  (fluctuates 2' fluid shifts)        Estimated/Assessed Needs    Weight Used For Calorie Calculations: 81.1 kg (178 lb 12.7 oz)   Height (cm): 167.6 cm  Energy Calorie Requirements (kcal): 2155  Energy Need Method: Clarence-St Jeor (x1.25 PAL)      RMR (Clarence-St. Jeor Equation): 1723.75      Weight Used For Protein Calculations: 81.1 kg (178 lb 12.7 oz)  Protein Requirements: 97-114g (1.2-1.4 g/kg)     Fluid Need Method: RDA Method      RDA Method (mL): 2155             Assessment and Plan    Malnutrition    Nutrition Diagnosis:  Inadequate energy intake    Related to (etiology):   Poor appetite 2' ascites and abdominal distention, poor food choices    Signs and Symptoms (as " evidenced by):   Pt consuming ~0-25% EEN and EPN, TF to be started today    Interventions/Recommendations (treatment strategy):  See recs    Nutrition Diagnosis Status:   Continues              Monitor and Evaluation    Food and Nutrient Intake: energy intake, food and beverage intake  Food and Nutrient Adminstration: diet order  Knowledge/Beliefs/Attitudes: food and nutrition knowledge/skill     Anthropometric Measurements: weight, weight change, body mass index  Biochemical Data, Medical Tests and Procedures: electrolyte and renal panel, gastrointestinal profile, glucose/endocrine profile, inflammatory profile, lipid profile  Nutrition-Focused Physical Findings: overall appearance    Nutrition Risk    Level of Risk:  (2x/week)    Nutrition Follow-Up    RD Follow-up?: Yes

## 2017-12-08 NOTE — SUBJECTIVE & OBJECTIVE
Interval History: NAEON Improved BB to 5.2. S/p ERCP with stent placed. Plan for I unit transfusion PRBC.  ml overnight. Net gain 150 ml/24hrs    Review of patient's allergies indicates:   Allergen Reactions    Bactrim [sulfamethoxazole-trimethoprim] Other (See Comments)     Mookie Trell Syndrome     Current Facility-Administered Medications   Medication Frequency    0.9%  NaCl infusion (for blood administration) Q24H PRN    0.9%  NaCl infusion PRN    0.9%  NaCl infusion PRN    0.9%  NaCl infusion Once    acetaminophen tablet 650 mg Q8H PRN    albumin human 25% bottle 25 g PRN    atovaquone suspension 1,500 mg Daily    bisacodyl suppository 10 mg Daily PRN    cefepime in dextrose 5 % 1 gram/50 mL IVPB 1 g Q24H    cycloSPORINE modified capsule 125 mg BID    docusate sodium capsule 100 mg TID    ergocalciferol capsule 50,000 Units Q7 Days    heparin (porcine) injection 5,000 Units Q8H    levETIRAcetam tablet 500 mg BID    [START ON 12/9/2017] levothyroxine tablet 75 mcg Before breakfast    midodrine tablet 5 mg PRN    multivitamin tablet 1 tablet Daily    ondansetron disintegrating tablet 8 mg Q8H PRN    ondansetron injection 4 mg Q8H PRN    oxyCODONE immediate release tablet 10 mg Q4H PRN    oxyCODONE immediate release tablet 5 mg Q4H PRN    pantoprazole EC tablet 40 mg Daily    polyethylene glycol packet 17 g Daily    ramelteon tablet 8 mg Nightly PRN    simethicone chewable tablet 80 mg TID PRN    sodium bicarbonate tablet 1,300 mg TID    sodium chloride 0.9% flush 3 mL PRN    sodium chloride 0.9% flush 3 mL PRN    thiamine tablet 100 mg Daily    ursodiol capsule 300 mg BID    valGANciclovir tablet 450 mg Every Mon, Wed, Fri       Objective:     Vital Signs (Most Recent):  Temp: 99.1 °F (37.3 °C) (12/08/17 1714)  Pulse: 104 (12/08/17 1714)  Resp: 20 (12/08/17 1659)  BP: (!) 115/57 (12/08/17 1714)  SpO2: 100 % (12/08/17 1202)  O2 Device (Oxygen Therapy): room air (12/08/17  1659) Vital Signs (24h Range):  Temp:  [98.9 °F (37.2 °C)-99.9 °F (37.7 °C)] 99.1 °F (37.3 °C)  Pulse:  [] 104  Resp:  [16-20] 20  SpO2:  [97 %-100 %] 100 %  BP: ()/(49-71) 115/57     Weight: 78.8 kg (173 lb 12.8 oz) (12/08/17 1465)  Body mass index is 28.05 kg/m².  Body surface area is 1.92 meters squared.    I/O last 3 completed shifts:  In: 1920 [P.O.:1220; I.V.:100; Other:500; IV Piggyback:100]  Out: 3250 [Urine:700; Other:2550]    Physical Exam   Constitutional: He is oriented to person, place, and time. He has a sickly appearance. No distress. Nasal cannula in place.   HENT:   Head: Normocephalic and atraumatic.   Eyes: Pupils are equal, round, and reactive to light. Scleral icterus is present.   Neck: Normal range of motion. Neck supple. No JVD present.   Cardiovascular: Regular rhythm.  Tachycardia present.  Exam reveals no gallop and no friction rub.    No murmur heard.  Pulmonary/Chest: Effort normal. He has decreased breath sounds in the right lower field and the left lower field. He has no wheezes. He has no rales.   Abdominal: Soft. He exhibits distension (positive asitis ). There is no tenderness. There is no guarding.   Wound vac to incision. Positive ascitis    Musculoskeletal: He exhibits edema (2+ pitting edema in LE ).   Neurological: He is alert and oriented to person, place, and time.   Positive Asterixis, somnolent and nauseated   Skin: Skin is warm and dry. Capillary refill takes less than 2 seconds. He is not diaphoretic.   Psychiatric: His behavior is normal. He exhibits a depressed mood.   Nursing note and vitals reviewed.      Significant Labs:  ABGs: No results for input(s): PH, PCO2, HCO3, POCSATURATED, BE in the last 168 hours.  BMP:     Recent Labs  Lab 12/08/17  0553   GLU 74      CO2 21*   BUN 35*   CREATININE 3.9*   CALCIUM 8.2*   MG 1.6     CBC:     Recent Labs  Lab 12/08/17  0553   WBC 4.22   RBC 2.43*   HGB 7.6*   HCT 23.1*      MCV 95   MCH 31.3*   MCHC  32.9     CMP:     Recent Labs  Lab 12/08/17  0553   GLU 74   CALCIUM 8.2*   ALBUMIN 2.1*   PROT 4.6*   *   K 3.5   CO2 21*      BUN 35*   CREATININE 3.9*   ALKPHOS 121   ALT 6*   AST 17   BILITOT 5.2*     All labs within the past 24 hours have been reviewed.     Significant Imaging:  Labs: Reviewed  X-Ray: Reviewed

## 2017-12-08 NOTE — CONSULTS
[facility]  Endocrinology  Consult Note    Consult Requested by: Solis Chapa MD   Reason for admit: Fever    HISTORY OF PRESENT ILLNESS:  28 yo M with HX of alcoholic cirrhosis s/p DDLT 10/19/2017, complicated by ATN requiring HD, superificial wound infection s/p wound vac placement, peritonitis negative for bile leak, and most recently admitted with abdominal pain, fever, hyperbilirubinemia, and leukocytosis s/p ERCP 12/6/17 significant for post-anastamosis stricture; stent placed.     Endocrinology was consulted regarding hypothyroidism noted on recent labs. Pt denies any prior history of thyroid disorders.  Family history significant for maternal grandmother with thyroid disease and subclinical hypothyroid disease in both mother and sister not requiring treatment.  Pt denies any medication history of amiodarone or lithium use. He denies any hair loss, dry skin, brittle nails, cold intolerance, or hoaseness. He endorses complaints of fatigue, SOB, and constipation, however, notes that these symptoms can also likely be attributed to complications associated with his liver disease and ascites. Pt notes his SOB improved with paracentesis 2 days ago.        Medications and/or Treatments Impacting Glycemic Control:  Immunotherapy:    Immunosuppressants         Stop Route Frequency     cycloSPORINE modified (NEORAL) capsule 100 mg      -- Oral 2 times daily        Steroids:   Hormones     None        Pressors:    Autonomic Drugs     Start     Stop Route Frequency Ordered    12/06/17 1850  midodrine tablet 5 mg      -- Oral Use PRN 12/06/17 1751          Prescriptions Prior to Admission   Medication Sig Dispense Refill Last Dose    amoxicillin-clavulanate 500-125mg (AUGMENTIN) 500-125 mg Tab Take 1 tablet (500 mg total) by mouth 2 (two) times daily. 28 tablet 1     aspirin (ECOTRIN) 81 MG EC tablet Take 1 tablet (81 mg total) by mouth once daily. 30 tablet 5 11/24/2017    atovaquone (MEPRON) 750  mg/5 mL Susp Take 10 mLs (1,500 mg total) by mouth once daily. Stop on 4/17/18 300 mL 5 11/24/2017    cycloSPORINE modified, NEORAL, (NEORAL) 100 MG capsule Take 1 capsule (100 mg total) by mouth 2 (two) times daily. 60 capsule 11     docusate sodium (COLACE) 100 MG capsule Take 100 mg by mouth 3 (three) times daily as needed for Constipation.   11/24/2017    ergocalciferol (ERGOCALCIFEROL) 50,000 unit Cap Take 1 capsule (50,000 Units total) by mouth every 7 days. 4 capsule 5 11/24/2017    levETIRAcetam (KEPPRA) 500 MG Tab Take 1 tablet (500 mg total) by mouth 2 (two) times daily. 60 tablet 11 11/24/2017    multivitamin (THERAGRAN) tablet Take 1 tablet by mouth once daily. 30 tablet 11 11/24/2017    mycophenolate (CELLCEPT) 250 mg Cap Take 2 capsules (500 mg total) by mouth 2 (two) times daily. 240 capsule 2 11/24/2017    ondansetron (ZOFRAN-ODT) 8 MG TbDL Take 1 tablet (8 mg total) by mouth every 8 (eight) hours as needed (nausea). 30 tablet 0 11/24/2017    oxyCODONE-acetaminophen (PERCOCET)  mg per tablet Take 0.5-1 tablets by mouth every 4 (four) hours as needed for Pain. 40 tablet 0     pantoprazole (PROTONIX) 40 MG tablet Take 1 tablet (40 mg total) by mouth once daily. 30 tablet 5 11/24/2017    predniSONE (DELTASONE) 10 MG tablet Take 20mg PO QD 10/29-11/4; 15mg PO QD 11/5-11/11; 10mg PO QD 11/12-11/18; 5mg PO QD 11/19-11/25; 2.5mg PO QD 11/26-12/2; stop on 12/2/17 60 tablet 0 11/24/2017    sodium bicarbonate 650 MG tablet Take 2 tablets (1,300 mg total) by mouth 2 (two) times daily. 120 tablet 2     thiamine 100 MG tablet Take 1 tablet (100 mg total) by mouth once daily. 30 tablet 6 11/24/2017    valGANciclovir (VALCYTE) 450 mg Tab Take 1 tablet (450 mg total) by mouth every Mon, Wed, Fri. Stop on 1/17/18 12 tablet 2 11/23/2017       Current Facility-Administered Medications   Medication Dose Route Frequency Provider Last Rate Last Dose    0.9%  NaCl infusion (for blood administration)    Intravenous Q24H PRN Fransisca Thomas PA-C        0.9%  NaCl infusion   Intravenous PRN Krish Frausto MD        0.9%  NaCl infusion   Intravenous PRN Krish Frausto MD        0.9%  NaCl infusion   Intravenous Once Krish Frausto MD        acetaminophen tablet 650 mg  650 mg Oral Q8H PRN Jenna M. Oppenheimer, PA-C        albumin human 25% bottle 25 g  25 g Intravenous PRN Krish Frausto MD        atovaquone suspension 1,500 mg  1,500 mg Oral Daily Jenna M. Oppenheimer, PA-C   1,500 mg at 12/08/17 0842    bisacodyl suppository 10 mg  10 mg Rectal Daily PRN Jin Perez, NP   10 mg at 12/07/17 1559    cefepime in dextrose 5 % 1 gram/50 mL IVPB 1 g  1 g Intravenous Q24H Fransisca Thomas PA-C 100 mL/hr at 12/07/17 2037 1 g at 12/07/17 2037    cycloSPORINE modified (NEORAL) capsule 100 mg  100 mg Oral BID Jenna M. Oppenheimer, PA-C   100 mg at 12/08/17 0841    docusate sodium capsule 100 mg  100 mg Oral TID Jin Perez, NP   100 mg at 12/08/17 0553    ergocalciferol capsule 50,000 Units  50,000 Units Oral Q7 Days Jenna M. Oppenheimer, PA-C   50,000 Units at 12/05/17 0911    heparin (porcine) injection 5,000 Units  5,000 Units Subcutaneous Q8H Shen Sousa MD   5,000 Units at 12/08/17 0553    levETIRAcetam tablet 500 mg  500 mg Oral BID Jenna M. Oppenheimer, PA-C   500 mg at 12/08/17 0841    magnesium sulfate 2g in water 50mL IVPB (premix)  2 g Intravenous Once Fransisca Thomas PA-C   2 g at 12/08/17 1012    midodrine tablet 5 mg  5 mg Oral PRN Krish Frausto MD        multivitamin tablet 1 tablet  1 tablet Oral Daily Jenna M. Oppenheimer, PA-C   1 tablet at 12/08/17 0841    ondansetron disintegrating tablet 8 mg  8 mg Oral Q8H PRN Jenna M. Oppenheimer, PA-C   8 mg at 12/06/17 2340    ondansetron injection 4 mg  4 mg Intravenous Q8H PRN Jin Perez NP   4 mg at 12/07/17 1242    oxyCODONE immediate release tablet 10 mg  10 mg Oral Q4H PRN Jin Perez, SAMREEN    10 mg at 12/08/17 0413    oxyCODONE immediate release tablet 5 mg  5 mg Oral Q4H PRN Jinilya SanchezPerez, NP   5 mg at 12/08/17 1115    pantoprazole EC tablet 40 mg  40 mg Oral Daily Jenna M. Oppenheimer, PA-C   40 mg at 12/08/17 0841    polyethylene glycol packet 17 g  17 g Oral Daily Jin Paredesquin, NP   17 g at 12/08/17 0842    ramelteon tablet 8 mg  8 mg Oral Nightly PRN Jenna M. Oppenheimer, PA-C        simethicone chewable tablet 80 mg  1 tablet Oral TID PRN Fransisca Thomas PA-C   80 mg at 12/07/17 1251    sodium bicarbonate tablet 1,300 mg  1,300 mg Oral TID Fransisca Thomas PA-C   1,300 mg at 12/08/17 0553    sodium chloride 0.9% flush 3 mL  3 mL Intravenous PRN Jenna M. Oppenheimer, PA-C        sodium chloride 0.9% flush 3 mL  3 mL Intravenous PRN Jesus Phelps Jr., MD        thiamine tablet 100 mg  100 mg Oral Daily Jenna M. Oppenheimer, PA-C   100 mg at 12/08/17 1013    ursodiol capsule 300 mg  300 mg Oral BID Fransisca Thomas PA-C   300 mg at 12/08/17 0841    valGANciclovir tablet 450 mg  450 mg Oral Every Mon, Wed, Fri Jenna M. Oppenheimer, PA-C   450 mg at 12/06/17 1757       Interval HPI:   Overnight events: NAEON.     PMH, PSH, FH, SH updated and reviewed     ROS:      Review of Systems   Constitutional: Positive for fatigue. Negative for chills and fever.   Eyes: Negative for visual disturbance.   Respiratory: Positive for shortness of breath.    Cardiovascular: Positive for leg swelling. Negative for chest pain and palpitations.   Gastrointestinal: Positive for abdominal distention, abdominal pain and constipation.   Endocrine: Negative for cold intolerance and heat intolerance.   Skin: Positive for wound. Negative for rash.   Neurological: Negative for dizziness and headaches.   Psychiatric/Behavioral: Negative for confusion.       Labs Reviewed and Include:  BASELINE Creatinine:   [unfilled]  [unfilled]  [unfilled]    Recent Labs  Lab 12/08/17  0553   GLU 74   CALCIUM 8.2*   ALBUMIN  2.1*   PROT 4.6*   *   K 3.5   CO2 21*      BUN 35*   CREATININE 3.9*   ALKPHOS 121   ALT 6*   AST 17   BILITOT 5.2*     Lab Results   Component Value Date    HGBA1C <4.0 (A) 10/18/2017       Nutritional status:   Body mass index is 28.05 kg/m².  Lab Results   Component Value Date    ALBUMIN 2.1 (L) 12/08/2017    ALBUMIN 1.7 (L) 12/07/2017    ALBUMIN 2.1 (L) 12/06/2017     Lab Results   Component Value Date    PREALBUMIN 5 (L) 10/11/2017       Estimated Creatinine Clearance: 27.8 mL/min (based on SCr of 3.9 mg/dL (H)).    POCT Glucose results:    Current Insulin Regimen:           PHYSICAL EXAMINATION:Vitals:    12/08/17 0839   BP: (!) 96/55   Pulse: 109   Resp: 16   Temp: 99.9 °F (37.7 °C)     Body mass index is 28.05 kg/m².    Physical Exam   Constitutional: He is oriented to person, place, and time. He appears well-developed. No distress.   HENT:   Head: Normocephalic and atraumatic.   NG tube in place   Eyes: EOM are normal. Pupils are equal, round, and reactive to light.   Neck: Normal range of motion. Neck supple. No thyromegaly present.   Cardiovascular: Regular rhythm.  Tachycardia present.    Pulmonary/Chest: Effort normal. No respiratory distress.   Abdominal: He exhibits distension. There is tenderness.   Wound vac in place   Musculoskeletal: He exhibits edema (b/l LE pitting). He exhibits no deformity.   Neurological: He is oriented to person, place, and time.   Skin: Skin is warm and dry. He is not diaphoretic.     .     ASSESSMENT and PLAN:    Hypothyroid    - TSH 11.467; T4 Total <3.0; Free T4 0.53  - DDx includes primary hypothyroidism vs non-thyroidal illness  - No previous hx of hypothyroidism, however positive family hx (maternal grandmother).   - Pt does endorse sxs of fatigue, constipation, and SOB, however these sxs may also be attributed to patient's other comorbidities. No overt signs of hypothyroidism.   - No baseline thyroid labs available.  - Will order TPO antibody and Reverse  T3 labs  - Would recommend treating with levothyroxine 50 mcg IV QD as pt likely to have decrease absorption given anasarca and given his need for tube feeds at present  - Once pt able to take PO, may transition to equivalent PO dose of levothyroxine 75 mcg QD to be given 1 hour prior to breakfast/ AM tube feed  - Will order repeat TSH and Free T4 on 12/11  - Will titrate dose based on lab trends            Volume overload    - Management per primary          Hyperbilirubinemia    - Management per primary          Liver transplanted    - Management per primary team            DISCHARGE NEEDS: will assess daily    Radhika Sabillon MD  Endocrinology  [facility]

## 2017-12-08 NOTE — ASSESSMENT & PLAN NOTE
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 He was admitted for peritonitis from 11/24-11/30 for peritonitis.     Plan:   - LAURA on CKD 4 (baseline sCr 3.9 - 4.0). Likely instrinsic etiology, multifactorial secondary to sepsis vs. Toxins  - Cont sodium biacarbonate 1,300 mg q TID for acidosis. Please.   - HD x 3 hrs for metabolic clearance and volume management  - Target 1 lt as tolerated keep MAP > 65  - would recommend to maintain Hg > 8 for improved chance on renal recovery.  - Avoid nephrotoxic medication, renal dose medications and avoid contrast   - Renal diet

## 2017-12-08 NOTE — PROGRESS NOTES
Follow up for wound vac dressing change to chevron transverse incision.  Wound granulating well , no odor detected, protected periwound with hydrocolloid dressing prior to applying new vac dressing .   Skin feels hot to touch , notified nurse to assess temperature .   Very groggy throughout dressing change.   Will plan to change again and re- measure on Tuesday.  Zoë Murphy RN CWON  [id number]

## 2017-12-08 NOTE — ASSESSMENT & PLAN NOTE
- Elevated t bili post liver txp in setting of abdominal pain, decreased appetitive and intermittent nausea.  PBS consulted to assess duct per ERCP.  - ERCP performed today but PBS unable to cannulate. MRCP with stricture.  - ERCP 12/6/17 with post-anastamosis stricture with stent placed.   - Tbili peaked at 9.1, now trending down.   -started ursodiol 12/7/17.

## 2017-12-09 PROBLEM — K83.1 BILIARY STRICTURE OF TRANSPLANTED LIVER: Status: ACTIVE | Noted: 2017-12-07

## 2017-12-09 PROBLEM — T86.49 BILIARY STRICTURE OF TRANSPLANTED LIVER: Status: ACTIVE | Noted: 2017-12-07

## 2017-12-09 LAB
ALBUMIN SERPL BCP-MCNC: 2.1 G/DL
ALP SERPL-CCNC: 111 U/L
ALT SERPL W/O P-5'-P-CCNC: <5 U/L
AMORPH CRY UR QL COMP ASSIST: ABNORMAL
ANION GAP SERPL CALC-SCNC: 10 MMOL/L
AST SERPL-CCNC: 15 U/L
BACTERIA #/AREA URNS AUTO: ABNORMAL /HPF
BACTERIA SPEC AEROBE CULT: NO GROWTH
BASOPHILS # BLD AUTO: 0.05 K/UL
BASOPHILS NFR BLD: 1.1 %
BILIRUB SERPL-MCNC: 4.6 MG/DL
BILIRUB UR QL STRIP: NEGATIVE
BUN SERPL-MCNC: 19 MG/DL
CALCIUM SERPL-MCNC: 8.1 MG/DL
CHLORIDE SERPL-SCNC: 105 MMOL/L
CLARITY UR REFRACT.AUTO: ABNORMAL
CO2 SERPL-SCNC: 21 MMOL/L
COLOR UR AUTO: ABNORMAL
CREAT SERPL-MCNC: 2.6 MG/DL
CYCLOSPORINE BLD LC/MS/MS-MCNC: 198 NG/ML
DIFFERENTIAL METHOD: ABNORMAL
EOSINOPHIL # BLD AUTO: 0 K/UL
EOSINOPHIL NFR BLD: 0 %
ERYTHROCYTE [DISTWIDTH] IN BLOOD BY AUTOMATED COUNT: 16.6 %
EST. GFR  (AFRICAN AMERICAN): 37.1 ML/MIN/1.73 M^2
EST. GFR  (NON AFRICAN AMERICAN): 32.1 ML/MIN/1.73 M^2
GLUCOSE SERPL-MCNC: 87 MG/DL
GLUCOSE UR QL STRIP: NEGATIVE
GRAN CASTS UR QL COMP ASSIST: 2 /LPF
HCT VFR BLD AUTO: 25.7 %
HGB BLD-MCNC: 8.7 G/DL
HGB UR QL STRIP: ABNORMAL
HYALINE CASTS UR QL AUTO: 2 /LPF
IMM GRANULOCYTES # BLD AUTO: 0.1 K/UL
IMM GRANULOCYTES NFR BLD AUTO: 2.1 %
INR PPP: 1.1
KETONES UR QL STRIP: NEGATIVE
LEUKOCYTE ESTERASE UR QL STRIP: NEGATIVE
LYMPHOCYTES # BLD AUTO: 1.2 K/UL
LYMPHOCYTES NFR BLD: 24.8 %
MAGNESIUM SERPL-MCNC: 1.8 MG/DL
MCH RBC QN AUTO: 31.5 PG
MCHC RBC AUTO-ENTMCNC: 33.9 G/DL
MCV RBC AUTO: 93 FL
MICROSCOPIC COMMENT: ABNORMAL
MONOCYTES # BLD AUTO: 0.7 K/UL
MONOCYTES NFR BLD: 15.6 %
NEUTROPHILS # BLD AUTO: 2.7 K/UL
NEUTROPHILS NFR BLD: 56.4 %
NITRITE UR QL STRIP: NEGATIVE
NRBC BLD-RTO: 0 /100 WBC
PH UR STRIP: 6 [PH] (ref 5–8)
PHOSPHATE SERPL-MCNC: 2.2 MG/DL
PLATELET # BLD AUTO: 163 K/UL
PMV BLD AUTO: 10.4 FL
POTASSIUM SERPL-SCNC: 3.5 MMOL/L
PROT SERPL-MCNC: 4.6 G/DL
PROT UR QL STRIP: ABNORMAL
PROTHROMBIN TIME: 12 SEC
RBC # BLD AUTO: 2.76 M/UL
RBC #/AREA URNS AUTO: 1 /HPF (ref 0–4)
SODIUM SERPL-SCNC: 136 MMOL/L
SP GR UR STRIP: 1.02 (ref 1–1.03)
SQUAMOUS #/AREA URNS AUTO: 1 /HPF
URN SPEC COLLECT METH UR: ABNORMAL
UROBILINOGEN UR STRIP-ACNC: NEGATIVE EU/DL
VANCOMYCIN SERPL-MCNC: 15.4 UG/ML
WBC # BLD AUTO: 4.75 K/UL
WBC #/AREA URNS AUTO: 2 /HPF (ref 0–5)

## 2017-12-09 PROCEDURE — 80158 DRUG ASSAY CYCLOSPORINE: CPT

## 2017-12-09 PROCEDURE — 63600175 PHARM REV CODE 636 W HCPCS: Performed by: PHYSICIAN ASSISTANT

## 2017-12-09 PROCEDURE — 63600175 PHARM REV CODE 636 W HCPCS: Performed by: STUDENT IN AN ORGANIZED HEALTH CARE EDUCATION/TRAINING PROGRAM

## 2017-12-09 PROCEDURE — 87040 BLOOD CULTURE FOR BACTERIA: CPT | Mod: 59

## 2017-12-09 PROCEDURE — 20600001 HC STEP DOWN PRIVATE ROOM

## 2017-12-09 PROCEDURE — 25000003 PHARM REV CODE 250: Performed by: PHYSICIAN ASSISTANT

## 2017-12-09 PROCEDURE — 85610 PROTHROMBIN TIME: CPT

## 2017-12-09 PROCEDURE — 81001 URINALYSIS AUTO W/SCOPE: CPT

## 2017-12-09 PROCEDURE — 63600175 PHARM REV CODE 636 W HCPCS: Performed by: TRANSPLANT SURGERY

## 2017-12-09 PROCEDURE — 63600175 PHARM REV CODE 636 W HCPCS: Performed by: NURSE PRACTITIONER

## 2017-12-09 PROCEDURE — 85025 COMPLETE CBC W/AUTO DIFF WBC: CPT

## 2017-12-09 PROCEDURE — 25000003 PHARM REV CODE 250: Performed by: NURSE PRACTITIONER

## 2017-12-09 PROCEDURE — 83735 ASSAY OF MAGNESIUM: CPT

## 2017-12-09 PROCEDURE — 02HV33Z INSERTION OF INFUSION DEVICE INTO SUPERIOR VENA CAVA, PERCUTANEOUS APPROACH: ICD-10-PCS | Performed by: TRANSPLANT SURGERY

## 2017-12-09 PROCEDURE — B4185 PARENTERAL SOL 10 GM LIPIDS: HCPCS | Performed by: TRANSPLANT SURGERY

## 2017-12-09 PROCEDURE — A4217 STERILE WATER/SALINE, 500 ML: HCPCS | Performed by: TRANSPLANT SURGERY

## 2017-12-09 PROCEDURE — 63600175 PHARM REV CODE 636 W HCPCS: Performed by: INTERNAL MEDICINE

## 2017-12-09 PROCEDURE — 25500020 PHARM REV CODE 255: Performed by: STUDENT IN AN ORGANIZED HEALTH CARE EDUCATION/TRAINING PROGRAM

## 2017-12-09 PROCEDURE — 25000003 PHARM REV CODE 250: Performed by: TRANSPLANT SURGERY

## 2017-12-09 PROCEDURE — 87086 URINE CULTURE/COLONY COUNT: CPT

## 2017-12-09 PROCEDURE — 84100 ASSAY OF PHOSPHORUS: CPT

## 2017-12-09 PROCEDURE — 99233 SBSQ HOSP IP/OBS HIGH 50: CPT | Mod: 24,,, | Performed by: PHYSICIAN ASSISTANT

## 2017-12-09 PROCEDURE — 80053 COMPREHEN METABOLIC PANEL: CPT

## 2017-12-09 PROCEDURE — 99255 IP/OBS CONSLTJ NEW/EST HI 80: CPT | Mod: ,,, | Performed by: INTERNAL MEDICINE

## 2017-12-09 PROCEDURE — 80202 ASSAY OF VANCOMYCIN: CPT

## 2017-12-09 RX ORDER — DOCUSATE SODIUM 50 MG/5ML
100 LIQUID ORAL 3 TIMES DAILY
Status: DISCONTINUED | OUTPATIENT
Start: 2017-12-09 | End: 2017-12-11

## 2017-12-09 RX ORDER — VALGANCICLOVIR HYDROCHLORIDE 50 MG/ML
450 POWDER, FOR SOLUTION ORAL
Status: DISCONTINUED | OUTPATIENT
Start: 2017-12-11 | End: 2017-12-11

## 2017-12-09 RX ORDER — LEVETIRACETAM 5 MG/ML
500 INJECTION INTRAVASCULAR 2 TIMES DAILY
Status: DISCONTINUED | OUTPATIENT
Start: 2017-12-09 | End: 2017-12-11

## 2017-12-09 RX ORDER — HYDROMORPHONE HYDROCHLORIDE 1 MG/ML
0.5 INJECTION, SOLUTION INTRAMUSCULAR; INTRAVENOUS; SUBCUTANEOUS ONCE
Status: COMPLETED | OUTPATIENT
Start: 2017-12-09 | End: 2017-12-09

## 2017-12-09 RX ADMIN — OXYCODONE HYDROCHLORIDE 10 MG: 5 TABLET ORAL at 04:12

## 2017-12-09 RX ADMIN — SODIUM BICARBONATE 650 MG TABLET 1300 MG: at 03:12

## 2017-12-09 RX ADMIN — ONDANSETRON 4 MG: 2 INJECTION INTRAMUSCULAR; INTRAVENOUS at 11:12

## 2017-12-09 RX ADMIN — URSODIOL 300 MG: 300 CAPSULE ORAL at 08:12

## 2017-12-09 RX ADMIN — LEVETIRACETAM 500 MG: 5 INJECTION INTRAVENOUS at 08:12

## 2017-12-09 RX ADMIN — SODIUM BICARBONATE 650 MG TABLET 1300 MG: at 09:12

## 2017-12-09 RX ADMIN — ONDANSETRON 4 MG: 2 INJECTION INTRAMUSCULAR; INTRAVENOUS at 08:12

## 2017-12-09 RX ADMIN — DOCUSATE SODIUM 100 MG: 100 CAPSULE, LIQUID FILLED ORAL at 04:12

## 2017-12-09 RX ADMIN — SODIUM BICARBONATE 650 MG TABLET 1300 MG: at 04:12

## 2017-12-09 RX ADMIN — OXYCODONE HYDROCHLORIDE 10 MG: 5 TABLET ORAL at 09:12

## 2017-12-09 RX ADMIN — OXYCODONE HYDROCHLORIDE 10 MG: 5 TABLET ORAL at 08:12

## 2017-12-09 RX ADMIN — SODIUM CHLORIDE: 234 INJECTION INTRAMUSCULAR; INTRAVENOUS; SUBCUTANEOUS at 10:12

## 2017-12-09 RX ADMIN — Medication 0.5 MG: at 04:12

## 2017-12-09 RX ADMIN — HEPARIN SODIUM 5000 UNITS: 5000 INJECTION, SOLUTION INTRAVENOUS; SUBCUTANEOUS at 05:12

## 2017-12-09 RX ADMIN — URSODIOL 300 MG: 300 CAPSULE ORAL at 09:12

## 2017-12-09 RX ADMIN — LEVOTHYROXINE SODIUM 75 MCG: 50 TABLET ORAL at 05:12

## 2017-12-09 RX ADMIN — IOHEXOL 15 ML: 350 INJECTION, SOLUTION INTRAVENOUS at 11:12

## 2017-12-09 RX ADMIN — HEPARIN SODIUM 5000 UNITS: 5000 INJECTION, SOLUTION INTRAVENOUS; SUBCUTANEOUS at 03:12

## 2017-12-09 RX ADMIN — ISAVUCONAZONIUM SULFATE: 74.4 INJECTION, POWDER, LYOPHILIZED, FOR SOLUTION INTRAVENOUS at 05:12

## 2017-12-09 RX ADMIN — PANTOPRAZOLE SODIUM 40 MG: 40 TABLET, DELAYED RELEASE ORAL at 08:12

## 2017-12-09 RX ADMIN — SOYBEAN OIL 250 ML: 20 INJECTION, SOLUTION INTRAVENOUS at 10:12

## 2017-12-09 RX ADMIN — CYCLOSPORINE 125 MG: 100 CAPSULE, LIQUID FILLED ORAL at 06:12

## 2017-12-09 RX ADMIN — IOHEXOL 15 ML: 350 INJECTION, SOLUTION INTRAVENOUS at 12:12

## 2017-12-09 RX ADMIN — DOCUSATE SODIUM 100 MG: 50 LIQUID ORAL at 03:12

## 2017-12-09 RX ADMIN — HEPARIN SODIUM 5000 UNITS: 5000 INJECTION, SOLUTION INTRAVENOUS; SUBCUTANEOUS at 09:12

## 2017-12-09 RX ADMIN — LEVETIRACETAM 500 MG: 500 TABLET, FILM COATED ORAL at 08:12

## 2017-12-09 RX ADMIN — ATOVAQUONE 1500 MG: 750 SUSPENSION ORAL at 08:12

## 2017-12-09 RX ADMIN — PIPERACILLIN SODIUM AND TAZOBACTAM SODIUM 4.5 G: 4; .5 INJECTION, POWDER, LYOPHILIZED, FOR SOLUTION INTRAVENOUS at 05:12

## 2017-12-09 RX ADMIN — THERA TABS 1 TABLET: TAB at 08:12

## 2017-12-09 RX ADMIN — THIAMINE HYDROCHLORIDE 100 MG: 100 INJECTION, SOLUTION INTRAMUSCULAR; INTRAVENOUS at 10:12

## 2017-12-09 RX ADMIN — Medication 100 MG: at 08:12

## 2017-12-09 RX ADMIN — POLYETHYLENE GLYCOL 3350 17 G: 17 POWDER, FOR SOLUTION ORAL at 08:12

## 2017-12-09 RX ADMIN — CYCLOSPORINE 125 MG: 100 CAPSULE, LIQUID FILLED ORAL at 08:12

## 2017-12-09 NOTE — ASSESSMENT & PLAN NOTE
Patient treated for peritonitis with Augmentin and several paracentesis in past admission. Had corrected stricture with stent via ERCP. Unknown if worsened ascites, or development of abscess since last evaluation vs bile leak. Will need abdominal CT to further assess current condition. Patient on broad antibiotic coverage. Recommend to continue cefepime to cover most biliary and GI sources. Possible if CT non diagnostic will need paracentesis for cell count and culture. Will adjust antibiotic regimen as results become available. Will follow CT results. Will follow progress.      - Pending abdominal CT results   - Continue cefepime   - May need paracentesis

## 2017-12-09 NOTE — PROCEDURES
"Jhonny Diana is a 27 y.o. male patient.    Temp: 99.9 °F (37.7 °C) (12/09/17 1600)  Pulse: 102 (12/09/17 1600)  Resp: 17 (12/09/17 1600)  BP: 110/66 (12/09/17 1600)  SpO2: 99 % (12/09/17 1600)  Weight: 79 kg (174 lb 2.6 oz) (12/09/17 0530)  Height: 5' 6" (167.6 cm) (12/02/17 2355)       Central Line  Date/Time: 12/9/2017 4:53 PM  Performed by: SAM ALONSO  Supervising provider: Dr. Lázaro Beckman.  Pre-operative Diagnosis: malnutrition requiring TPN  Post-operative diagnosis: malnutrition requiring TPN  Consent Done: Yes  Time out: Immediately prior to procedure a "time out" was called to verify the correct patient, procedure, equipment, support staff and site/side marked as required.  Indications: med administration and vascular access (and need for TPN)  Anesthesia: local infiltration and see MAR for details    Anesthesia:  Local Anesthetic: lidocaine 1% with epinephrine  Preparation: skin prepped with ChloraPrep  Skin prep agent dried: skin prep agent completely dried prior to procedure  Sterile barriers: all five maximum sterile barriers used - cap, mask, sterile gown, sterile gloves, and large sterile sheet  Hand hygiene: hand hygiene performed prior to central venous catheter insertion  Location details: right internal jugular  Catheter type: single lumen  Catheter Length: 16cm    Ultrasound guidance: yes  Number of attempts: 1  Technical procedures used: Seldinger  Significant surgical tasks conducted by the assistant(s): none  Complications: none  Estimated blood loss (mL): 5  Specimens: No  Implants: No  Post-procedure: line sutured  Complications: No        Sam Alonso  12/9/2017    "

## 2017-12-09 NOTE — CONSULTS
Attestation signed by Marcela Longoria MD at 12/9/2017 4:26 PM   I have seen the patient, reviewed the Fellow's history and physical, assessment and plan. I have personally interviewed and examined the patient at bedside and: agree with the findings.       27-year-old male  - Alcoholic cirrhosis (c/b HE, EV, portal HTN, and HRS)  - s/p DDLT 10/19/2017, CMV D+/R+, steroid induction, on maintenance tacro/MMF/pred  - c/b seizures, transitioned from tacro to cyclo  - Superificial wound infection s/p wound vac to chevron   - Readmitted 11/21/2017 for suspected wound infection treated with amox-clav   - Readmitted 11/24/2017 with peritonitis (WBC 5000) discharged on amox-clav  - Presents with persistent fevers, RUQ abdominal pain, anorexia  - Biliary stricture s/p sphincterotomy, biliary stent placement in CBD 12/6/2017    Concern for ongoing peritonitis as ascites fluid with fluctuating PMN while on antibiotics (3700->[id number]->312)     Recommendations:  - Agree with abdominal washout given unknown cause of persistent peritonitis  - Discontinue vanc / cefepime  - Start pip-tazo for anaerobic coverage  - Start isavuconazole for coverage of Candida and mold prophylaxis                       [facility]  Infectious Disease  Consult Note    Patient Name: Jhonny Diana  MRN: [id number]  Admission Date: 12/3/2017  Hospital Length of Stay: 6 days  Attending Physician: Solis Chapa MD  Primary Care Provider: Primary Doctor No     Isolation Status: No active isolations    Patient information was obtained from patient, parent and ER records.      Consults  Assessment/Plan:     Peritonitis    Patient treated for peritonitis with Augmentin and several paracentesis in past admission. Had corrected stricture with stent via ERCP. Unknown if worsened ascites, or development of abscess since last evaluation vs bile leak. Will need abdominal CT to further assess current condition.  Will follow CT results. Will follow  progress.      - Pending abdominal CT results   - Discontinue cefepime   - Start pip/tazo  - Start isavuconazole            Thank you for your consult. I will follow-up with patient. Please contact us if you have any additional questions.    Bernarda Mena MD  Infectious Disease  [facility]    Subjective:     Principal Problem: Fever    HPI: Case of 28 y/o male PMHx Asthma, Cirrosis 2ry to Etoh abuse, DDLT 10/19/17  Steroid induction, CMV D+/R+. Maintenance tacro/mmf/pred. Complicated with seizures, LAURA, superficial wound infection with wound vac to chevron and peritonitis. Past admission include:     - 11/21/17 Wound infection with wound vac placement D/H on augmentin  - 11/24/17 admit for peritonitis with therapeutic and diagnostic paracentesis. D/H on Augmentin for 2 weeks        Admitted on 12/3/17 patient presented on this occasion to the ED with subjective fevers 100.5, general malaise since 2 days prior to evaluation. Had also diffuse tenderness of abdomen, nausea. Denied emesis, diarrhea, congestion, cough. On 12/6/17 ERCP was done and stent was place to correct stricture. At time had elevation of WBC of 12 from 6 and elevation of tbili with peak at 9. Both trending down but patient continues with general malaise and no improvement in symptoms since ERCP. ID consulted regarding fungal prophylaxis and antibiotic recommendations.          Review of Systems   Constitutional: Positive for appetite change, chills, fatigue and fever.   HENT: Negative for congestion, postnasal drip, rhinorrhea and sore throat.    Respiratory: Negative for cough and shortness of breath.    Cardiovascular: Negative for leg swelling.   Gastrointestinal: Positive for abdominal distention and abdominal pain. Negative for diarrhea and vomiting.   Skin: Negative for rash.     Objective:     Vital Signs (Most Recent):  Temp: 100.3 °F (37.9 °C) (12/09/17 1159)  Pulse: 101 (12/09/17 1200)  Resp: 17 (12/09/17  1159)  BP: 114/64 (12/09/17 1159)  SpO2: 98 % (12/09/17 1159) Vital Signs (24h Range):  Temp:  [98.8 °F (37.1 °C)-100.6 °F (38.1 °C)] 100.3 °F (37.9 °C)  Pulse:  [] 101  Resp:  [17-20] 17  SpO2:  [97 %-100 %] 98 %  BP: (104-129)/(49-76) 114/64     Weight: 79 kg (174 lb 2.6 oz)  Body mass index is 28.11 kg/m².    Estimated Creatinine Clearance: 41.8 mL/min (based on SCr of 2.6 mg/dL (H)).    Physical Exam   Constitutional: He is oriented to person, place, and time. He appears well-developed and well-nourished.   HENT:   Head: Normocephalic and atraumatic.   Eyes: Conjunctivae and EOM are normal. Pupils are equal, round, and reactive to light.   Neck: Normal range of motion. Neck supple.   Cardiovascular: Normal rate, regular rhythm and normal heart sounds.    Pulmonary/Chest: Effort normal and breath sounds normal.   Abdominal: Soft. Bowel sounds are normal. He exhibits distension. There is tenderness. There is no rebound and no guarding.   Wound vac in place on chevron incision    Musculoskeletal: Normal range of motion. He exhibits no edema, tenderness or deformity.   Neurological: He is alert and oriented to person, place, and time.   Skin: No rash noted. No erythema.       Significant Labs:   Blood Culture:   Recent Labs  Lab 11/17/17  0657 11/24/17  1440 11/24/17  1443 12/03/17  0117 12/03/17  0119   LABBLOO No growth after 5 days. No growth after 5 days. No growth after 5 days. No growth after 5 days. No growth after 5 days.     BMP:   Recent Labs  Lab 12/09/17  0337   GLU 87      K 3.5      CO2 21*   BUN 19   CREATININE 2.6*   CALCIUM 8.1*   MG 1.8     CBC:   Recent Labs  Lab 12/08/17  0553 12/09/17  0338   WBC 4.22 4.75   HGB 7.6* 8.7*   HCT 23.1* 25.7*    163     Microbiology Results (last 7 days)     Procedure Component Value Units Date/Time    Aerobic culture [[id number]] Collected:  12/05/17 1657    Order Status:  Completed Specimen:  Body Fluid from Ascites Updated:  12/09/17 7129      Aerobic Bacterial Culture No growth    Urine culture [[id number]] Collected:  12/09/17 1101    Order Status:  Sent Specimen:  Urine from Urine, Clean Catch Updated:  12/09/17 1243    Blood culture [[id number]] Collected:  12/09/17 0830    Order Status:  Sent Specimen:  Blood Updated:  12/09/17 0923    Blood culture [[id number]] Collected:  12/09/17 0830    Order Status:  Sent Specimen:  Blood Updated:  12/09/17 0923    Culture, Anaerobe [[id number]] Collected:  12/05/17 1657    Order Status:  Completed Specimen:  Body Fluid from Ascites Updated:  12/08/17 1351     Anaerobic Culture Culture in progress    Blood culture #2 **CANNOT BE ORDERED STAT** [[id number]] Collected:  12/03/17 0119    Order Status:  Completed Specimen:  Blood from Peripheral, Hand, Right Updated:  12/08/17 0612     Blood Culture, Routine No growth after 5 days.    Blood culture #1 **CANNOT BE ORDERED STAT** [[id number]] Collected:  12/03/17 0117    Order Status:  Completed Specimen:  Blood from Peripheral, Hand, Left Updated:  12/08/17 0612     Blood Culture, Routine No growth after 5 days.    Gram stain [[id number]] Collected:  12/05/17 1657    Order Status:  Completed Specimen:  Body Fluid from Ascites Updated:  12/05/17 2304     Gram Stain Result Rare WBC's      No organisms seen    Urine culture [[id number]] Collected:  12/03/17 0148    Order Status:  Completed Specimen:  Urine from Clean Catch Updated:  12/04/17 0356     Urine Culture, Routine No growth    Narrative:       Add on per Dr Oppenheimer PA-C order #[id number] 12/03/2017  02:43     Urine culture [[id number]]     Order Status:  Completed Specimen:  Urine           Significant Imaging: I have reviewed all pertinent imaging results/findings within the past 24 hours.

## 2017-12-09 NOTE — PLAN OF CARE
Problem: Patient Care Overview  Goal: Plan of Care Review  Outcome: Ongoing (interventions implemented as appropriate)  No acute events overnight. TF started this shift at 20 ml/hr and pt now at goal of 40 ml/hr per SHANE and tolerating well. Discussed with pt need to maintain HOB at least 30 degrees - verbalized understanding. No appetite. Refused all meals and supplemental drinks. PO intake encouraged. VSS. Will cont to monitor.     Problem: Fall Risk (Adult)  Goal: Absence of Falls  Patient will demonstrate the desired outcomes by discharge/transition of care.   Outcome: Ongoing (interventions implemented as appropriate)  Fall precautions maintained. Bed wheels locked, bed in lowest position, upper SR up x 2, call light in reach, non-skid socks when OOB. Instructed pt to call for assistance as needed.     Problem: Infection, Risk/Actual (Adult)  Goal: Infection Prevention/Resolution  Patient will demonstrate the desired outcomes by discharge/transition of care.   Outcome: Ongoing (interventions implemented as appropriate)  TMax 100.3. Remains on cefepime. Vanc on hold at this time - random level to be drawn this am. Wound vac to chevron with serous output. Tachycardic 100-115 bpm.     Problem: Pain, Acute (Adult)  Goal: Acceptable Pain Control/Comfort Level  Patient will demonstrate the desired outcomes by discharge/transition of care.   Outcome: Ongoing (interventions implemented as appropriate)  Pain to abdomen moderately controlled with PRN oxycodone. Pt states he feels worse pain with higher temps.     Problem: Hemodialysis (Adult)  Goal: Signs and Symptoms of Listed Potential Problems Will be Absent, Minimized or Managed (Hemodialysis)  Signs and symptoms of listed potential problems will be absent, minimized or managed by discharge/transition of care (reference Hemodialysis (Adult) CPG).   Outcome: Ongoing (interventions implemented as appropriate)  HD 12/8: 1 liter removed and 1 unit PRBC administered then.

## 2017-12-09 NOTE — ASSESSMENT & PLAN NOTE
- Elevated t bili post liver txp in setting of abdominal pain, decreased appetitive and intermittent nausea.  PBS consulted to assess duct per ERCP.  - ERCP performed 12/5 PBS unable to cannulate. MRCP with stricture.  - ERCP 12/6/17 with post-anastamosis stricture with stent placed.   - Tbili peaked at 9.1, now trending down.   -started ursodiol 12/7/17.

## 2017-12-09 NOTE — CONSULTS
Consult received. Full note to follow.    Please call for questions.    Thanks,  Bernarda Borrego MD  Infectious Disease Fellow, PGY-4  Pager: [phone]  [facility]

## 2017-12-09 NOTE — SUBJECTIVE & OBJECTIVE
Review of Systems   Constitutional: Positive for appetite change, chills, fatigue and fever.   HENT: Negative for congestion, postnasal drip, rhinorrhea and sore throat.    Respiratory: Negative for cough and shortness of breath.    Cardiovascular: Negative for leg swelling.   Gastrointestinal: Positive for abdominal distention and abdominal pain. Negative for diarrhea and vomiting.   Skin: Negative for rash.     Objective:     Vital Signs (Most Recent):  Temp: 100.3 °F (37.9 °C) (12/09/17 1159)  Pulse: 101 (12/09/17 1200)  Resp: 17 (12/09/17 1159)  BP: 114/64 (12/09/17 1159)  SpO2: 98 % (12/09/17 1159) Vital Signs (24h Range):  Temp:  [98.8 °F (37.1 °C)-100.6 °F (38.1 °C)] 100.3 °F (37.9 °C)  Pulse:  [] 101  Resp:  [17-20] 17  SpO2:  [97 %-100 %] 98 %  BP: (104-129)/(49-76) 114/64     Weight: 79 kg (174 lb 2.6 oz)  Body mass index is 28.11 kg/m².    Estimated Creatinine Clearance: 41.8 mL/min (based on SCr of 2.6 mg/dL (H)).    Physical Exam   Constitutional: He is oriented to person, place, and time. He appears well-developed and well-nourished.   HENT:   Head: Normocephalic and atraumatic.   Eyes: Conjunctivae and EOM are normal. Pupils are equal, round, and reactive to light.   Neck: Normal range of motion. Neck supple.   Cardiovascular: Normal rate, regular rhythm and normal heart sounds.    Pulmonary/Chest: Effort normal and breath sounds normal.   Abdominal: Soft. Bowel sounds are normal. He exhibits distension. There is tenderness. There is no rebound and no guarding.   Wound vac in place on chevron incision    Musculoskeletal: Normal range of motion. He exhibits no edema, tenderness or deformity.   Neurological: He is alert and oriented to person, place, and time.   Skin: No rash noted. No erythema.       Significant Labs:   Blood Culture:   Recent Labs  Lab 11/17/17  0657 11/24/17  1440 11/24/17  1443 12/03/17  0117 12/03/17  0119   ABDIFATAH No growth after 5 days. No growth after 5 days. No  growth after 5 days. No growth after 5 days. No growth after 5 days.     BMP:   Recent Labs  Lab 12/09/17  0337   GLU 87      K 3.5      CO2 21*   BUN 19   CREATININE 2.6*   CALCIUM 8.1*   MG 1.8     CBC:   Recent Labs  Lab 12/08/17  0553 12/09/17  0338   WBC 4.22 4.75   HGB 7.6* 8.7*   HCT 23.1* 25.7*    163     Microbiology Results (last 7 days)     Procedure Component Value Units Date/Time    Aerobic culture [[id number]] Collected:  12/05/17 1657    Order Status:  Completed Specimen:  Body Fluid from Ascites Updated:  12/09/17 1259     Aerobic Bacterial Culture No growth    Urine culture [[id number]] Collected:  12/09/17 1101    Order Status:  Sent Specimen:  Urine from Urine, Clean Catch Updated:  12/09/17 1243    Blood culture [[id number]] Collected:  12/09/17 0830    Order Status:  Sent Specimen:  Blood Updated:  12/09/17 0923    Blood culture [[id number]] Collected:  12/09/17 0830    Order Status:  Sent Specimen:  Blood Updated:  12/09/17 0923    Culture, Anaerobe [[id number]] Collected:  12/05/17 1657    Order Status:  Completed Specimen:  Body Fluid from Ascites Updated:  12/08/17 1351     Anaerobic Culture Culture in progress    Blood culture #2 **CANNOT BE ORDERED STAT** [[id number]] Collected:  12/03/17 0119    Order Status:  Completed Specimen:  Blood from Peripheral, Hand, Right Updated:  12/08/17 0612     Blood Culture, Routine No growth after 5 days.    Blood culture #1 **CANNOT BE ORDERED STAT** [[id number]] Collected:  12/03/17 0117    Order Status:  Completed Specimen:  Blood from Peripheral, Hand, Left Updated:  12/08/17 0612     Blood Culture, Routine No growth after 5 days.    Gram stain [[id number]] Collected:  12/05/17 1657    Order Status:  Completed Specimen:  Body Fluid from Ascites Updated:  12/05/17 2304     Gram Stain Result Rare WBC's      No organisms seen    Urine culture [[id number]] Collected:  12/03/17 0148    Order Status:  Completed Specimen:  Urine from Clean Catch  Updated:  12/04/17 0356     Urine Culture, Routine No growth    Narrative:       Add on per Dr Oppenheimer PA-C order #[id number] 12/03/2017  02:43     Urine culture [[id number]]     Order Status:  Completed Specimen:  Urine           Significant Imaging: I have reviewed all pertinent imaging results/findings within the past 24 hours.

## 2017-12-09 NOTE — PROGRESS NOTES
Patient received an extra dose of Zofran 4mg IVP per orders before starting the PO contrast for the CT of the abdomen and pelvis. LTS approved the patient to drink half of the contrast for each drink. CT staff notified. First round of PO contrast given.

## 2017-12-09 NOTE — PROGRESS NOTES
TF stopped at this time, new orders noted for a STAT CT of the abdomen and pelvis with oral contrast. Patient reports he still feels nauseous and will not be able to drink the oral contrast. Offered to put the contrast down the SHANE tube, patient prefers not to due to the nausea. Geronimo ABARCA notified and reports that she will come up with a game plan.

## 2017-12-09 NOTE — ASSESSMENT & PLAN NOTE
- Pt malnourished with poor PO intake  - SHANE tube placed 12/8/17 and TF started  - Not tolerating TF --> central line placed and TPN to start tonight

## 2017-12-09 NOTE — ASSESSMENT & PLAN NOTE
- A/w fevers and hyperbilirubinemia.   - LFTs improving.  - PBS consulted. ERCP attempted on 12/4 but unable to cannulate. MRCP on 12/4 noted with stricture.  - ERCP 12/6/17  With post-anastamosis stricture, stent placed.   - Para 12/5/17, 3L removed ( down from 1497, cr 5.2, bili 2.5)

## 2017-12-09 NOTE — ASSESSMENT & PLAN NOTE
- Diagnosed on previous admission.  Last para 11/29 with improving cell counts WBC 1400 with 45% PNMs   Was d/c'ed on augmentin, will switch to vanc/cef (see leukocytosis)   - Repeat Paracentesis 12/5/17, with  (improving from 1497)  - CT A/P repeated today with signif ascites  - NPO after midnight with plan for takeback or para in AM  - Transition abx to zosyn/vanc/isavu

## 2017-12-09 NOTE — ASSESSMENT & PLAN NOTE
- Cr level remains elevated  -  Creatinine significantly elevated on 12/6 and HD started.    - Will assess daily need for HD. Appreciate Nephrology recs.   - patient with volume overload but low albumin.     - will give albumin x1   - 2D echo within normal limits  - HD last 12/8.  - Cont strict I/O's.

## 2017-12-09 NOTE — SUBJECTIVE & OBJECTIVE
Scheduled Meds:   sodium chloride 0.9%   Intravenous Once    atovaquone  1,500 mg Oral Daily    cycloSPORINE modified (NEORAL)  125 mg Oral BID    docusate  100 mg Oral TID    ergocalciferol  50,000 Units Oral Q7 Days    fat emulsion 20%  250 mL Intravenous Daily    heparin (porcine)  5,000 Units Subcutaneous Q8H    custom IVPB builder   Intravenous Q8H    [START ON 12/12/2017] custom IVPB builder   Intravenous Daily    levETIRAcetam in NaCl (iso-os)  500 mg Intravenous BID    levothyroxine  75 mcg Oral Before breakfast    multivitamin  1 tablet Oral Daily    pantoprazole  40 mg Oral Daily    piperacillin-tazobactam 4.5 g in dextrose 5 % 100 mL IVPB (ready to mix system)  4.5 g Intravenous Q12H    polyethylene glycol  17 g Oral Daily    sodium bicarbonate  1,300 mg Oral TID    [START ON 12/10/2017] thiamine (VITAMIN B1) IVPB  100 mg Intravenous Once    ursodiol  300 mg Oral BID    [START ON 12/11/2017] valganciclovir 50 mg/ml  450 mg Oral Every Mon, Wed, Fri     Continuous Infusions:   TPN ADULT CENTRAL LINE CUSTOM       PRN Meds:sodium chloride, sodium chloride 0.9%, acetaminophen, albumin human 25%, bisacodyl, heparin (porcine), midodrine, ondansetron, ondansetron, oxyCODONE, oxyCODONE, ramelteon, simethicone, sodium chloride 0.9%, sodium chloride 0.9%    Review of Systems   Constitutional: Positive for activity change, appetite change, fatigue and fever. Negative for chills.   Respiratory: Negative for cough, shortness of breath and wheezing.    Cardiovascular: Positive for leg swelling.   Gastrointestinal: Positive for abdominal distention, abdominal pain, constipation and nausea. Negative for vomiting.   Genitourinary: Negative for difficulty urinating and dysuria.   Skin: Positive for wound.   Allergic/Immunologic: Positive for immunocompromised state.   Neurological: Positive for weakness.   Psychiatric/Behavioral: Negative for confusion and decreased concentration. The patient is  nervous/anxious.      Objective:     Vital Signs (Most Recent):  Temp: 99.9 °F (37.7 °C) (12/09/17 1600)  Pulse: 102 (12/09/17 1600)  Resp: 17 (12/09/17 1600)  BP: 110/66 (12/09/17 1600)  SpO2: 99 % (12/09/17 1600) Vital Signs (24h Range):  Temp:  [98.8 °F (37.1 °C)-100.6 °F (38.1 °C)] 99.9 °F (37.7 °C)  Pulse:  [100-115] 102  Resp:  [17-20] 17  SpO2:  [97 %-100 %] 99 %  BP: (110-126)/(57-76) 110/66     Weight: 79 kg (174 lb 2.6 oz)  Body mass index is 28.11 kg/m².    Intake/Output - Last 3 Shifts       12/07 0700 - 12/08 0659 12/08 0700 - 12/09 0659 12/09 0700 - 12/10 0659    P.O. 790 420 380    I.V. (mL/kg) 100 (1.3) 50 (0.6)     Blood       Other 0 600     NG/GT  320 480    IV Piggyback 50 50     Total Intake(mL/kg) 940 (11.9) 1440 (18.2) 860 (10.9)    Urine (mL/kg/hr) 325 (0.2) 625 (0.3) 200 (0.3)    Emesis/NG output 0 (0) 0 (0)     Other 50 (0) 1600 (0.8)     Stool 0 (0) 0 (0) 0 (0)    Blood 0 (0) 0 (0)     Total Output 375 2225 200    Net +565 -785 +660           Urine Occurrence 4 x 0 x     Stool Occurrence 3 x 0 x 0 x    Emesis Occurrence 0 x            Physical Exam   Constitutional: He is oriented to person, place, and time. He appears well-developed. No distress.   Temporal and distal extremity muscle wasting   HENT:   Head: Normocephalic and atraumatic.   Neck: Normal range of motion. Neck supple. No JVD present.   Cardiovascular: Regular rhythm and normal heart sounds.  Tachycardia present.    No murmur heard.  Pulmonary/Chest: Effort normal. No respiratory distress. He has decreased breath sounds in the right middle field, the right lower field and the left lower field. He has no wheezes. He exhibits no tenderness.   Abdominal: Soft. He exhibits distension. There is tenderness (diffusely ). There is no rebound and no guarding.   Wound vac to incision   Musculoskeletal: Normal range of motion. He exhibits edema (2+ LE edema). He exhibits no tenderness.   Neurological: He is alert and oriented to person,  place, and time. He has normal reflexes.   Skin: Skin is warm and dry. He is not diaphoretic.   Psychiatric: His behavior is normal. Judgment and thought content normal. His mood appears anxious.   Pt anxious and expressing feeling overwhelmed   Nursing note and vitals reviewed.      Laboratory:  Immunosuppressants         Stop Route Frequency     cycloSPORINE modified capsule 125 mg      -- Oral 2 times daily        CBC:     Recent Labs  Lab 12/09/17 0338   WBC 4.75   RBC 2.76*   HGB 8.7*   HCT 25.7*      MCV 93   MCH 31.5*   MCHC 33.9     CMP:     Recent Labs  Lab 12/09/17 0337   GLU 87   CALCIUM 8.1*   ALBUMIN 2.1*   PROT 4.6*      K 3.5   CO2 21*      BUN 19   CREATININE 2.6*   ALKPHOS 111   ALT <5*   AST 15   BILITOT 4.6*     Coagulation:     Recent Labs  Lab 12/09/17 0338   INR 1.1     Cardiac Markers: No results for input(s): CKMB, TROPONINT, MYOGLOBIN in the last 168 hours.  Labs within the past 24 hours have been reviewed.    Diagnostic Results:  I have personally reviewed all pertinent imaging studies.

## 2017-12-09 NOTE — PROGRESS NOTES
Dialysis completed. 3 hours. 1 liter removed. 1 unit RBC given. Right permcath locked with heparin, capped and taped. Tolerated tx well. Report given to Khadijah SHEEHAN.

## 2017-12-10 LAB
ALBUMIN SERPL BCP-MCNC: 2 G/DL
ALP SERPL-CCNC: 102 U/L
ALT SERPL W/O P-5'-P-CCNC: 6 U/L
ANION GAP SERPL CALC-SCNC: 9 MMOL/L
ANISOCYTOSIS BLD QL SMEAR: SLIGHT
AST SERPL-CCNC: 15 U/L
BACTERIA UR CULT: NORMAL
BASOPHILS NFR BLD: 3 %
BILIRUB SERPL-MCNC: 3.5 MG/DL
BUN SERPL-MCNC: 25 MG/DL
CALCIUM SERPL-MCNC: 7.7 MG/DL
CHLORIDE SERPL-SCNC: 101 MMOL/L
CO2 SERPL-SCNC: 24 MMOL/L
CREAT SERPL-MCNC: 3.4 MG/DL
CYCLOSPORINE BLD LC/MS/MS-MCNC: 205 NG/ML
DACRYOCYTES BLD QL SMEAR: ABNORMAL
DIFFERENTIAL METHOD: ABNORMAL
EOSINOPHIL NFR BLD: 0 %
ERYTHROCYTE [DISTWIDTH] IN BLOOD BY AUTOMATED COUNT: 16.3 %
EST. GFR  (AFRICAN AMERICAN): 26.8 ML/MIN/1.73 M^2
EST. GFR  (NON AFRICAN AMERICAN): 23.2 ML/MIN/1.73 M^2
GLUCOSE SERPL-MCNC: 132 MG/DL
HCT VFR BLD AUTO: 24.1 %
HGB BLD-MCNC: 8.1 G/DL
HYPOCHROMIA BLD QL SMEAR: ABNORMAL
IMM GRANULOCYTES # BLD AUTO: ABNORMAL K/UL
IMM GRANULOCYTES NFR BLD AUTO: ABNORMAL %
INR PPP: 1.2
LYMPHOCYTES NFR BLD: 8 %
MAGNESIUM SERPL-MCNC: 1.8 MG/DL
MCH RBC QN AUTO: 31 PG
MCHC RBC AUTO-ENTMCNC: 33.6 G/DL
MCV RBC AUTO: 92 FL
MONOCYTES NFR BLD: 11 %
NEUTROPHILS NFR BLD: 77 %
NEUTS BAND NFR BLD MANUAL: 1 %
NRBC BLD-RTO: 0 /100 WBC
PHOSPHATE SERPL-MCNC: 2.4 MG/DL
PLATELET # BLD AUTO: 137 K/UL
PLATELET BLD QL SMEAR: ABNORMAL
PMV BLD AUTO: 10.6 FL
POIKILOCYTOSIS BLD QL SMEAR: SLIGHT
POLYCHROMASIA BLD QL SMEAR: ABNORMAL
POTASSIUM SERPL-SCNC: 3.1 MMOL/L
PROT SERPL-MCNC: 4.7 G/DL
PROTHROMBIN TIME: 12.3 SEC
RBC # BLD AUTO: 2.61 M/UL
SCHISTOCYTES BLD QL SMEAR: PRESENT
SODIUM SERPL-SCNC: 134 MMOL/L
WBC # BLD AUTO: 3.89 K/UL

## 2017-12-10 PROCEDURE — 63600175 PHARM REV CODE 636 W HCPCS: Performed by: INTERNAL MEDICINE

## 2017-12-10 PROCEDURE — 63600175 PHARM REV CODE 636 W HCPCS: Performed by: TRANSPLANT SURGERY

## 2017-12-10 PROCEDURE — 25000003 PHARM REV CODE 250: Performed by: TRANSPLANT SURGERY

## 2017-12-10 PROCEDURE — A4217 STERILE WATER/SALINE, 500 ML: HCPCS | Performed by: TRANSPLANT SURGERY

## 2017-12-10 PROCEDURE — 80053 COMPREHEN METABOLIC PANEL: CPT

## 2017-12-10 PROCEDURE — 27000173 HC ACAPELLA DEVICE DH OR DM

## 2017-12-10 PROCEDURE — 85007 BL SMEAR W/DIFF WBC COUNT: CPT

## 2017-12-10 PROCEDURE — 63600175 PHARM REV CODE 636 W HCPCS: Performed by: PHYSICIAN ASSISTANT

## 2017-12-10 PROCEDURE — 83735 ASSAY OF MAGNESIUM: CPT

## 2017-12-10 PROCEDURE — 25000003 PHARM REV CODE 250: Performed by: PHYSICIAN ASSISTANT

## 2017-12-10 PROCEDURE — 84100 ASSAY OF PHOSPHORUS: CPT

## 2017-12-10 PROCEDURE — B4185 PARENTERAL SOL 10 GM LIPIDS: HCPCS | Performed by: PHYSICIAN ASSISTANT

## 2017-12-10 PROCEDURE — 97161 PT EVAL LOW COMPLEX 20 MIN: CPT

## 2017-12-10 PROCEDURE — 85027 COMPLETE CBC AUTOMATED: CPT

## 2017-12-10 PROCEDURE — 99900035 HC TECH TIME PER 15 MIN (STAT)

## 2017-12-10 PROCEDURE — 80158 DRUG ASSAY CYCLOSPORINE: CPT

## 2017-12-10 PROCEDURE — 94664 DEMO&/EVAL PT USE INHALER: CPT

## 2017-12-10 PROCEDURE — 20600001 HC STEP DOWN PRIVATE ROOM

## 2017-12-10 PROCEDURE — 25000003 PHARM REV CODE 250: Performed by: NURSE PRACTITIONER

## 2017-12-10 PROCEDURE — 63600175 PHARM REV CODE 636 W HCPCS: Performed by: NURSE PRACTITIONER

## 2017-12-10 PROCEDURE — 97530 THERAPEUTIC ACTIVITIES: CPT

## 2017-12-10 PROCEDURE — 99233 SBSQ HOSP IP/OBS HIGH 50: CPT | Mod: ,,, | Performed by: INTERNAL MEDICINE

## 2017-12-10 PROCEDURE — 85610 PROTHROMBIN TIME: CPT

## 2017-12-10 PROCEDURE — 99233 SBSQ HOSP IP/OBS HIGH 50: CPT | Mod: 24,,, | Performed by: PHYSICIAN ASSISTANT

## 2017-12-10 RX ADMIN — ATOVAQUONE 1500 MG: 750 SUSPENSION ORAL at 08:12

## 2017-12-10 RX ADMIN — ISAVUCONAZONIUM SULFATE: 74.4 INJECTION, POWDER, LYOPHILIZED, FOR SOLUTION INTRAVENOUS at 09:12

## 2017-12-10 RX ADMIN — URSODIOL 300 MG: 300 CAPSULE ORAL at 08:12

## 2017-12-10 RX ADMIN — LEVETIRACETAM 500 MG: 5 INJECTION INTRAVENOUS at 09:12

## 2017-12-10 RX ADMIN — PIPERACILLIN SODIUM AND TAZOBACTAM SODIUM 4.5 G: 4; .5 INJECTION, POWDER, LYOPHILIZED, FOR SOLUTION INTRAVENOUS at 04:12

## 2017-12-10 RX ADMIN — SODIUM BICARBONATE 650 MG TABLET 1300 MG: at 09:12

## 2017-12-10 RX ADMIN — OXYCODONE HYDROCHLORIDE 10 MG: 5 TABLET ORAL at 01:12

## 2017-12-10 RX ADMIN — OXYCODONE HYDROCHLORIDE 10 MG: 5 TABLET ORAL at 08:12

## 2017-12-10 RX ADMIN — ISAVUCONAZONIUM SULFATE: 74.4 INJECTION, POWDER, LYOPHILIZED, FOR SOLUTION INTRAVENOUS at 05:12

## 2017-12-10 RX ADMIN — SODIUM BICARBONATE 650 MG TABLET 1300 MG: at 05:12

## 2017-12-10 RX ADMIN — OXYCODONE HYDROCHLORIDE 10 MG: 5 TABLET ORAL at 05:12

## 2017-12-10 RX ADMIN — HEPARIN SODIUM 5000 UNITS: 5000 INJECTION, SOLUTION INTRAVENOUS; SUBCUTANEOUS at 12:12

## 2017-12-10 RX ADMIN — ONDANSETRON 4 MG: 2 INJECTION INTRAMUSCULAR; INTRAVENOUS at 12:12

## 2017-12-10 RX ADMIN — ISAVUCONAZONIUM SULFATE: 74.4 INJECTION, POWDER, LYOPHILIZED, FOR SOLUTION INTRAVENOUS at 01:12

## 2017-12-10 RX ADMIN — HEPARIN SODIUM 5000 UNITS: 5000 INJECTION, SOLUTION INTRAVENOUS; SUBCUTANEOUS at 05:12

## 2017-12-10 RX ADMIN — DIBASIC SODIUM PHOSPHATE, MONOBASIC POTASSIUM PHOSPHATE AND MONOBASIC SODIUM PHOSPHATE 1 TABLET: 852; 155; 130 TABLET ORAL at 12:12

## 2017-12-10 RX ADMIN — LEVOTHYROXINE SODIUM 75 MCG: 50 TABLET ORAL at 05:12

## 2017-12-10 RX ADMIN — URSODIOL 300 MG: 300 CAPSULE ORAL at 09:12

## 2017-12-10 RX ADMIN — OXYCODONE HYDROCHLORIDE 10 MG: 5 TABLET ORAL at 09:12

## 2017-12-10 RX ADMIN — CALCIUM GLUCONATE: 94 INJECTION, SOLUTION INTRAVENOUS at 09:12

## 2017-12-10 RX ADMIN — CYCLOSPORINE 125 MG: 100 CAPSULE, LIQUID FILLED ORAL at 08:12

## 2017-12-10 RX ADMIN — SODIUM BICARBONATE 650 MG TABLET 1300 MG: at 12:12

## 2017-12-10 RX ADMIN — OXYCODONE HYDROCHLORIDE 10 MG: 5 TABLET ORAL at 04:12

## 2017-12-10 RX ADMIN — PANTOPRAZOLE SODIUM 40 MG: 40 TABLET, DELAYED RELEASE ORAL at 08:12

## 2017-12-10 RX ADMIN — CYCLOSPORINE 125 MG: 100 CAPSULE, LIQUID FILLED ORAL at 05:12

## 2017-12-10 RX ADMIN — SOYBEAN OIL 250 ML: 20 INJECTION, SOLUTION INTRAVENOUS at 09:12

## 2017-12-10 RX ADMIN — THERA TABS 1 TABLET: TAB at 08:12

## 2017-12-10 RX ADMIN — HEPARIN SODIUM 5000 UNITS: 5000 INJECTION, SOLUTION INTRAVENOUS; SUBCUTANEOUS at 09:12

## 2017-12-10 NOTE — PLAN OF CARE
Problem: Patient Care Overview  Goal: Plan of Care Review  Outcome: Ongoing (interventions implemented as appropriate)  Patient is AAOx3, independent. PT is working with the patient. Patient ambulated in the jenkins with PT. Encouraging the patient to get OOB to the chair, patient reports he just wants to lay in bed. Patient c/o abdominal and back pain. Pain meds given per orders. Patient c/o nausea, anti-nausea med given per orders. Patient started on TPN and Lipids last night. Patient continues to have a very poor appetite. Patient meds were changed to oral solutions and IV yesterday. Right nare KEOP tube CDI and clamped. Patient to be NPO after MN tonight for a paracentesis tomorrow or may go back to the OR for a washout. Wound vac to [facility] CDI with serous output. Reminded the patient and his wife to call for assistance. Call light and personal items are within reach.

## 2017-12-10 NOTE — PT/OT/SLP EVAL
Physical Therapy Evaluation and Discharge Note    Patient Name:  Jhonny Diana   MRN:  [id number]    Recommendations:     Discharge Recommendations:  home   Discharge Equipment Recommendations: none   Barriers to discharge: None    Assessment:     Jhonny Diana is a 27 y.o. male admitted with a medical diagnosis of Fever. .  At this time, patient is functioning at their prior level of function and does not require further acute PT services.     Recent Surgery: Procedure(s) (LRB):  ERCP (N/A) 4 Days Post-Op    Plan:     During this hospitalization, patient does not require further acute PT services.  Please re-consult if situation changes.     Plan of Care Reviewed with: patient    Subjective     Communicated with nursing prior to session.  Patient found supine in bed upon PT entry to room, agreeable to evaluation.      Chief Complaint: abd. pain  Patient comments/goals: to go home  Pain/Comfort:  · Pain Rating 1: 5/10  · Location - Orientation 1: generalized  · Location 1: abdomen  · Pain Addressed 1: Pre-medicate for activity, Reposition, Cessation of Activity  · Pain Rating Post-Intervention 1: 5/10    Patients cultural, spiritual, Spiritism conflicts given the current situation: no    Living Environment:  Pt. Lives alone in 1st floor apartment, but pt.'s mother has been assisting.  Prior to admission, patients level of function was mod. indep.  Patient has the following equipment: walker, rolling, bedside commode.   Upon discharge, patient will have assistance from mother.    Objective:     Patient found with: peripheral IV, wound vac     General Precautions: Standard, fall   Orthopedic Precautions:    Braces:       Exams:  · Cognitive Exam:  Patient is oriented to Person, Place, Time and Situation and follows 100% of all commands   · RUE ROM: WFL  · RUE Strength: WFL  · LUE ROM: WFL  · LUE Strength: WFL  · RLE ROM: WFL  · RLE Strength: WFL  · LLE ROM: WFL  · LLE Strength: WFL    Functional Mobility:  · Bed  Mobility:     · Rolling Right: modified independence  · Scooting: modified independence  · Supine to Sit: modified independence  · Transfers:     · Sit to Stand:  modified independence with no AD  · Gait: ~400' with RW and Supervision without LOB  · Balance: fair+    AM-PAC 6 CLICK MOBILITY  Total Score:23       Therapeutic Activities and Exercises:   Discussed pt.'s progress, goals, and POC. Discussed importance of OOB activity while in hospital.    Patient left seated on EOB with all lines intact and call button in reach.    GOALS:    Physical Therapy Goals     Not on file          Multidisciplinary Problems (Resolved)        Problem: Physical Therapy Goal    Goal Priority Disciplines Outcome Goal Variances Interventions   Physical Therapy Goal   (Resolved)     PT/OT, PT Outcome(s) achieved                     History:     Past Medical History:   Diagnosis Date    Alcoholic hepatitis with ascites     Alcoholic hepatitis with ascites     History of hematemesis 9/28/2017    Hypertension     Renal disorder        Past Surgical History:   Procedure Laterality Date    APPENDECTOMY         Clinical Decision Making:     History  Co-morbidities and personal factors that may impact the plan of care Examination  Body Structures and Functions, activity limitations and participation restrictions that may impact the plan of care Clinical Presentation   Decision Making/ Complexity Score   Co-morbidities:   [] Time since onset of injury / illness / exacerbation  [] Status of current condition  []Patient's cognitive status and safety concerns    [] Multiple Medical Problems (see med hx)  Personal Factors:   [] Patient's age  [] Prior Level of function   [] Patient's home situation (environment and family support)  [] Patient's level of motivation  [] Expected progression of patient      HISTORY:(criteria)    [] 81400 - no personal factors/history    [] 41502 - has 1-2 personal factor/comorbidity     [] 07935 - has >3  personal factor/comorbidity     Body Regions:  [] Objective examination findings  [] Head     []  Neck  [] Trunk   [] Upper Extremity  [] Lower Extremity    Body Systems:  [] For communication ability, affect, cognition, language, and learning style: the assessment of the ability to make needs known, consciousness, orientation (person, place, and time), expected emotional /behavioral responses, and learning preferences (eg, learning barriers, education  needs)  [] For the neuromuscular system: a general assessment of gross coordinated movement (eg, balance, gait, locomotion, transfers, and transitions) and motor function  (motor control and motor learning)  [] For the musculoskeletal system: the assessment of gross symmetry, gross range of motion, gross strength, height, and weight  [] For the integumentary system: the assessment of pliability(texture), presence of scar formation, skin color, and skin integrity  [] For cardiovascular/pulmonary system: the assessment of heart rate, respiratory rate, blood pressure, and edema     Activity limitations:    [] Patient's cognitive status and saf ety concerns          [] Status of current condition      [] Weight bearing restriction  [] Cardiopulmunary Restriction    Participation Restrictions:   [] Goals and goal agreement with the patient     [] Rehab potential (prognosis) and probable outcome      Examination of Body System: (criteria)    [] 16711 - addressing 1-2 elements    [] 38184 - addressing a total of 3 or more elements     [] 95098 -  Addressing a total of 4 or more elements         Clinical Presentation: (criteria)  Choose one     On examination of body system using standardized tests and measures patient presents with (CHOOSE ONE) elements from any of the following: body structures and functions, activity limitations, and/or participation restrictions.  Leading to a clinical presentation that is considered (CHOOSE ONE)                              Clinical  Decision Making  (Eval Complexity):  Choose One     Time Tracking:     PT Received On: 12/10/17  PT Start Time: 1126     PT Stop Time: 1151  PT Total Time (min): 25 min     Billable Minutes: Evaluation 15 and Therapeutic Activity 10      Imer Barba, PT  12/10/2017

## 2017-12-11 ENCOUNTER — TELEPHONE (OUTPATIENT)
Dept: GASTROENTEROLOGY | Facility: CLINIC | Age: 27
End: 2017-12-11

## 2017-12-11 DIAGNOSIS — K83.1 BILIARY STRICTURE: Primary | ICD-10-CM

## 2017-12-11 LAB
ALBUMIN SERPL BCP-MCNC: 1.7 G/DL
ALP SERPL-CCNC: 90 U/L
ALT SERPL W/O P-5'-P-CCNC: 5 U/L
ANION GAP SERPL CALC-SCNC: 10 MMOL/L
ANISOCYTOSIS BLD QL SMEAR: SLIGHT
APPEARANCE FLD: CLEAR
AST SERPL-CCNC: 14 U/L
BASOPHILS # BLD AUTO: ABNORMAL K/UL
BASOPHILS NFR BLD: 0 %
BILIRUB FLD-MCNC: 2.4 MG/DL
BILIRUB SERPL-MCNC: 2.7 MG/DL
BODY FLD TYPE: NORMAL
BODY FLUID SOURCE, BILIRUBIN: NORMAL
BUN SERPL-MCNC: 32 MG/DL
CALCIUM SERPL-MCNC: 7.6 MG/DL
CHLORIDE SERPL-SCNC: 100 MMOL/L
CO2 SERPL-SCNC: 22 MMOL/L
COLOR FLD: NORMAL
CREAT SERPL-MCNC: 4.1 MG/DL
CYCLOSPORINE BLD LC/MS/MS-MCNC: 184 NG/ML
DIFFERENTIAL METHOD: ABNORMAL
EOSINOPHIL # BLD AUTO: ABNORMAL K/UL
EOSINOPHIL NFR BLD: 0 %
ERYTHROCYTE [DISTWIDTH] IN BLOOD BY AUTOMATED COUNT: 16.1 %
EST. GFR  (AFRICAN AMERICAN): 21.4 ML/MIN/1.73 M^2
EST. GFR  (NON AFRICAN AMERICAN): 18.5 ML/MIN/1.73 M^2
GIANT PLATELETS BLD QL SMEAR: PRESENT
GLUCOSE SERPL-MCNC: 106 MG/DL
HCT VFR BLD AUTO: 23.1 %
HGB BLD-MCNC: 7.7 G/DL
HYPOCHROMIA BLD QL SMEAR: ABNORMAL
IMM GRANULOCYTES # BLD AUTO: ABNORMAL K/UL
IMM GRANULOCYTES NFR BLD AUTO: ABNORMAL %
INR PPP: 1.1
LYMPHOCYTES # BLD AUTO: ABNORMAL K/UL
LYMPHOCYTES NFR BLD: 21 %
LYMPHOCYTES NFR FLD MANUAL: 26 %
MAGNESIUM SERPL-MCNC: 1.5 MG/DL
MCH RBC QN AUTO: 31.6 PG
MCHC RBC AUTO-ENTMCNC: 33.3 G/DL
MCV RBC AUTO: 95 FL
MESOTHL CELL NFR FLD MANUAL: 1 %
METAMYELOCYTES NFR BLD MANUAL: 1 %
MONOCYTES # BLD AUTO: ABNORMAL K/UL
MONOCYTES NFR BLD: 9 %
MONOS+MACROS NFR FLD MANUAL: 49 %
NEUTROPHILS NFR BLD: 69 %
NEUTROPHILS NFR FLD MANUAL: 24 %
NRBC BLD-RTO: 0 /100 WBC
PHOSPHATE SERPL-MCNC: 2.8 MG/DL
PLATELET # BLD AUTO: 124 K/UL
PLATELET BLD QL SMEAR: ABNORMAL
PMV BLD AUTO: 11.2 FL
POLYCHROMASIA BLD QL SMEAR: ABNORMAL
POTASSIUM SERPL-SCNC: 2.9 MMOL/L
PROT SERPL-MCNC: 4.5 G/DL
PROTHROMBIN TIME: 11.8 SEC
RBC # BLD AUTO: 2.44 M/UL
SODIUM SERPL-SCNC: 132 MMOL/L
T4 FREE SERPL-MCNC: 0.66 NG/DL
TSH SERPL DL<=0.005 MIU/L-ACNC: 17.51 UIU/ML
WBC # BLD AUTO: 3.39 K/UL
WBC # FLD: 129 /CU MM

## 2017-12-11 PROCEDURE — 85610 PROTHROMBIN TIME: CPT

## 2017-12-11 PROCEDURE — 84100 ASSAY OF PHOSPHORUS: CPT

## 2017-12-11 PROCEDURE — 87075 CULTR BACTERIA EXCEPT BLOOD: CPT

## 2017-12-11 PROCEDURE — 25000003 PHARM REV CODE 250: Performed by: NURSE PRACTITIONER

## 2017-12-11 PROCEDURE — 94664 DEMO&/EVAL PT USE INHALER: CPT

## 2017-12-11 PROCEDURE — P9047 ALBUMIN (HUMAN), 25%, 50ML: HCPCS | Performed by: PHYSICIAN ASSISTANT

## 2017-12-11 PROCEDURE — 99232 SBSQ HOSP IP/OBS MODERATE 35: CPT | Mod: ,,, | Performed by: INTERNAL MEDICINE

## 2017-12-11 PROCEDURE — 0W9G30Z DRAINAGE OF PERITONEAL CAVITY WITH DRAINAGE DEVICE, PERCUTANEOUS APPROACH: ICD-10-PCS | Performed by: RADIOLOGY

## 2017-12-11 PROCEDURE — 87205 SMEAR GRAM STAIN: CPT

## 2017-12-11 PROCEDURE — 99233 SBSQ HOSP IP/OBS HIGH 50: CPT | Mod: 24,,, | Performed by: PHYSICIAN ASSISTANT

## 2017-12-11 PROCEDURE — 83735 ASSAY OF MAGNESIUM: CPT

## 2017-12-11 PROCEDURE — 85027 COMPLETE CBC AUTOMATED: CPT

## 2017-12-11 PROCEDURE — 94761 N-INVAS EAR/PLS OXIMETRY MLT: CPT

## 2017-12-11 PROCEDURE — 63600175 PHARM REV CODE 636 W HCPCS: Performed by: PHYSICIAN ASSISTANT

## 2017-12-11 PROCEDURE — 27000173 HC ACAPELLA DEVICE DH OR DM

## 2017-12-11 PROCEDURE — 25000003 PHARM REV CODE 250: Performed by: PHYSICIAN ASSISTANT

## 2017-12-11 PROCEDURE — 20600001 HC STEP DOWN PRIVATE ROOM

## 2017-12-11 PROCEDURE — 80053 COMPREHEN METABOLIC PANEL: CPT

## 2017-12-11 PROCEDURE — 84443 ASSAY THYROID STIM HORMONE: CPT

## 2017-12-11 PROCEDURE — 84439 ASSAY OF FREE THYROXINE: CPT

## 2017-12-11 PROCEDURE — 99233 SBSQ HOSP IP/OBS HIGH 50: CPT | Mod: ,,, | Performed by: INTERNAL MEDICINE

## 2017-12-11 PROCEDURE — 63600175 PHARM REV CODE 636 W HCPCS: Performed by: TRANSPLANT SURGERY

## 2017-12-11 PROCEDURE — B4185 PARENTERAL SOL 10 GM LIPIDS: HCPCS | Performed by: PHYSICIAN ASSISTANT

## 2017-12-11 PROCEDURE — A4217 STERILE WATER/SALINE, 500 ML: HCPCS | Performed by: PHYSICIAN ASSISTANT

## 2017-12-11 PROCEDURE — 25000003 PHARM REV CODE 250: Performed by: TRANSPLANT SURGERY

## 2017-12-11 PROCEDURE — 85007 BL SMEAR W/DIFF WBC COUNT: CPT

## 2017-12-11 PROCEDURE — 82247 BILIRUBIN TOTAL: CPT

## 2017-12-11 PROCEDURE — 99900035 HC TECH TIME PER 15 MIN (STAT)

## 2017-12-11 PROCEDURE — 89051 BODY FLUID CELL COUNT: CPT

## 2017-12-11 PROCEDURE — 63600175 PHARM REV CODE 636 W HCPCS: Performed by: INTERNAL MEDICINE

## 2017-12-11 PROCEDURE — 87070 CULTURE OTHR SPECIMN AEROBIC: CPT

## 2017-12-11 PROCEDURE — 80158 DRUG ASSAY CYCLOSPORINE: CPT

## 2017-12-11 PROCEDURE — 87102 FUNGUS ISOLATION CULTURE: CPT

## 2017-12-11 PROCEDURE — 84478 ASSAY OF TRIGLYCERIDES: CPT

## 2017-12-11 RX ORDER — DOCUSATE SODIUM 100 MG/1
100 CAPSULE, LIQUID FILLED ORAL 3 TIMES DAILY PRN
Status: DISCONTINUED | OUTPATIENT
Start: 2017-12-11 | End: 2017-12-13

## 2017-12-11 RX ORDER — SODIUM CHLORIDE 9 MG/ML
INJECTION, SOLUTION INTRAVENOUS
Status: DISCONTINUED | OUTPATIENT
Start: 2017-12-11 | End: 2017-12-12

## 2017-12-11 RX ORDER — ALBUMIN HUMAN 250 G/1000ML
25 SOLUTION INTRAVENOUS ONCE
Status: COMPLETED | OUTPATIENT
Start: 2017-12-11 | End: 2017-12-11

## 2017-12-11 RX ORDER — HEPARIN SODIUM 5000 [USP'U]/ML
5000 INJECTION, SOLUTION INTRAVENOUS; SUBCUTANEOUS EVERY 8 HOURS
Status: DISCONTINUED | OUTPATIENT
Start: 2017-12-12 | End: 2017-12-20 | Stop reason: HOSPADM

## 2017-12-11 RX ORDER — URSODIOL 300 MG/1
300 CAPSULE ORAL 2 TIMES DAILY
Status: DISCONTINUED | OUTPATIENT
Start: 2017-12-11 | End: 2017-12-20 | Stop reason: HOSPADM

## 2017-12-11 RX ORDER — VALGANCICLOVIR 450 MG/1
450 TABLET, FILM COATED ORAL
Status: DISCONTINUED | OUTPATIENT
Start: 2017-12-13 | End: 2017-12-14

## 2017-12-11 RX ORDER — POTASSIUM CHLORIDE 14.9 MG/ML
20 INJECTION INTRAVENOUS
Status: DISCONTINUED | OUTPATIENT
Start: 2017-12-11 | End: 2017-12-11

## 2017-12-11 RX ORDER — LEVETIRACETAM 500 MG/1
500 TABLET ORAL 2 TIMES DAILY
Status: DISCONTINUED | OUTPATIENT
Start: 2017-12-11 | End: 2017-12-20 | Stop reason: HOSPADM

## 2017-12-11 RX ORDER — POTASSIUM CHLORIDE 20 MEQ/15ML
40 SOLUTION ORAL ONCE
Status: COMPLETED | OUTPATIENT
Start: 2017-12-11 | End: 2017-12-11

## 2017-12-11 RX ORDER — HYDROMORPHONE HYDROCHLORIDE 1 MG/ML
0.5 INJECTION, SOLUTION INTRAMUSCULAR; INTRAVENOUS; SUBCUTANEOUS ONCE
Status: COMPLETED | OUTPATIENT
Start: 2017-12-11 | End: 2017-12-11

## 2017-12-11 RX ORDER — HYDROMORPHONE HYDROCHLORIDE 1 MG/ML
0.5 INJECTION, SOLUTION INTRAMUSCULAR; INTRAVENOUS; SUBCUTANEOUS ONCE
Status: DISCONTINUED | OUTPATIENT
Start: 2017-12-11 | End: 2017-12-11

## 2017-12-11 RX ORDER — POTASSIUM CHLORIDE 14.9 MG/ML
20 INJECTION INTRAVENOUS ONCE
Status: COMPLETED | OUTPATIENT
Start: 2017-12-11 | End: 2017-12-11

## 2017-12-11 RX ORDER — SODIUM CHLORIDE 9 MG/ML
INJECTION, SOLUTION INTRAVENOUS ONCE
Status: DISCONTINUED | OUTPATIENT
Start: 2017-12-11 | End: 2017-12-12

## 2017-12-11 RX ADMIN — CALCIUM GLUCONATE: 94 INJECTION, SOLUTION INTRAVENOUS at 10:12

## 2017-12-11 RX ADMIN — PANTOPRAZOLE SODIUM 40 MG: 40 TABLET, DELAYED RELEASE ORAL at 08:12

## 2017-12-11 RX ADMIN — LEVOTHYROXINE SODIUM 75 MCG: 50 TABLET ORAL at 05:12

## 2017-12-11 RX ADMIN — OXYCODONE HYDROCHLORIDE 10 MG: 5 TABLET ORAL at 01:12

## 2017-12-11 RX ADMIN — SODIUM BICARBONATE 650 MG TABLET 1300 MG: at 05:12

## 2017-12-11 RX ADMIN — PIPERACILLIN SODIUM AND TAZOBACTAM SODIUM 4.5 G: 4; .5 INJECTION, POWDER, LYOPHILIZED, FOR SOLUTION INTRAVENOUS at 04:12

## 2017-12-11 RX ADMIN — POTASSIUM CHLORIDE 20 MEQ: 200 INJECTION, SOLUTION INTRAVENOUS at 07:12

## 2017-12-11 RX ADMIN — ATOVAQUONE 1500 MG: 750 SUSPENSION ORAL at 08:12

## 2017-12-11 RX ADMIN — CYCLOSPORINE 125 MG: 100 CAPSULE, LIQUID FILLED ORAL at 08:12

## 2017-12-11 RX ADMIN — POTASSIUM CHLORIDE 40 MEQ: 20 SOLUTION ORAL at 08:12

## 2017-12-11 RX ADMIN — LEVETIRACETAM 500 MG: 500 TABLET, FILM COATED ORAL at 08:12

## 2017-12-11 RX ADMIN — PIPERACILLIN SODIUM AND TAZOBACTAM SODIUM 4.5 G: 4; .5 INJECTION, POWDER, LYOPHILIZED, FOR SOLUTION INTRAVENOUS at 03:12

## 2017-12-11 RX ADMIN — SOYBEAN OIL 250 ML: 20 INJECTION, SOLUTION INTRAVENOUS at 10:12

## 2017-12-11 RX ADMIN — OXYCODONE HYDROCHLORIDE 10 MG: 5 TABLET ORAL at 08:12

## 2017-12-11 RX ADMIN — ISAVUCONAZONIUM SULFATE: 74.4 INJECTION, POWDER, LYOPHILIZED, FOR SOLUTION INTRAVENOUS at 09:12

## 2017-12-11 RX ADMIN — HYDROMORPHONE HYDROCHLORIDE 0.5 MG: 1 INJECTION, SOLUTION INTRAMUSCULAR; INTRAVENOUS; SUBCUTANEOUS at 01:12

## 2017-12-11 RX ADMIN — THERA TABS 1 TABLET: TAB at 08:12

## 2017-12-11 RX ADMIN — OXYCODONE HYDROCHLORIDE 10 MG: 5 TABLET ORAL at 07:12

## 2017-12-11 RX ADMIN — CYCLOSPORINE 125 MG: 100 CAPSULE, LIQUID FILLED ORAL at 05:12

## 2017-12-11 RX ADMIN — MORPHINE 450 MG: 10 SOLUTION ORAL at 08:12

## 2017-12-11 RX ADMIN — OXYCODONE HYDROCHLORIDE 10 MG: 5 TABLET ORAL at 03:12

## 2017-12-11 RX ADMIN — URSODIOL 300 MG: 300 CAPSULE ORAL at 08:12

## 2017-12-11 RX ADMIN — ISAVUCONAZONIUM SULFATE: 74.4 INJECTION, POWDER, LYOPHILIZED, FOR SOLUTION INTRAVENOUS at 12:12

## 2017-12-11 RX ADMIN — ALBUMIN (HUMAN) 25 G: 12.5 SOLUTION INTRAVENOUS at 03:12

## 2017-12-11 RX ADMIN — HEPARIN SODIUM 5000 UNITS: 5000 INJECTION, SOLUTION INTRAVENOUS; SUBCUTANEOUS at 05:12

## 2017-12-11 RX ADMIN — LEVETIRACETAM 500 MG: 5 INJECTION INTRAVENOUS at 08:12

## 2017-12-11 NOTE — ASSESSMENT & PLAN NOTE
- Pt malnourished with poor PO intake  - SHANE tube placed 12/8/17 and TF started  - Not tolerating TF --> central line placed and TPN started 12/9  - Remove shane

## 2017-12-11 NOTE — PROCEDURES
Radiology Post-Procedure Note    Pre Op Diagnosis: Ascites  Post Op Diagnosis: Same    Procedure: Ultrasound Guided Paracentesis    Procedure performed by: Jenn JOLLEY, Jacquelyn     Written Informed Consent Obtained: Yes  Specimen Removed: YES carlos  Estimated Blood Loss: Minimal    Findings:   Successful paracentesis.  Albumin administered PRN per protocol.    Patient tolerated procedure well.    Jacquelyn Barajas, APRN, FNP  Interventional Radiology  [phone] [employer]

## 2017-12-11 NOTE — PROGRESS NOTES
Paracentesis complete, 4500 MLs removed. Specimen sent to lab. . Dressing applied to RLQ puncture site, dressing clean dry and intact. Patient to return to inpatient room. Repot called to inpatient nurse, Khadijah SHEEHAN.

## 2017-12-11 NOTE — PROGRESS NOTES
"[facility]  Endocrinology  Progress Note    Admit Date: 12/3/2017     28 yo M with HX of alcoholic cirrhosis s/p DDLT 10/19/2017, complicated by ATN requiring HD, superificial wound infection s/p wound vac placement, peritonitis negative for bile leak, and most recently admitted with abdominal pain, fever, hyperbilirubinemia, and leukocytosis s/p ERCP 12/6/17 significant for post-anastamosis stricture; stent placed.     Endocrinology was consulted regarding hypothyroidism noted on recent labs. Pt denies any prior history of thyroid disorders.  Family history significant for maternal grandmother with thyroid disease and subclinical hypothyroid disease in both mother and sister not requiring treatment.  Pt denies any medication history of amiodarone or lithium use. He denies any hair loss, dry skin, brittle nails, cold intolerance, or hoaseness. He endorses complaints of fatigue, SOB, and constipation, however, notes that these symptoms can also likely be attributed to complications associated with his liver disease and ascites. Pt notes his SOB improved with paracentesis 2 days ago.        Interval HPI:   Overnight events: NAEON.    BP (!) 104/59 (BP Location: Left arm, Patient Position: Lying)   Pulse 98   Temp 99.7 °F (37.6 °C) (Oral)   Resp 18   Ht 5' 6" (1.676 m)   Wt 80.3 kg (177 lb 0.5 oz)   SpO2 98%   BMI 28.57 kg/m²       Labs Reviewed and Include      Recent Labs  Lab 12/11/17  0500      CALCIUM 7.6*   ALBUMIN 1.7*   PROT 4.5*   *   K 2.9*   CO2 22*      BUN 32*   CREATININE 4.1*   ALKPHOS 90   ALT 5*   AST 14   BILITOT 2.7*     Lab Results   Component Value Date    WBC 3.39 (L) 12/11/2017    HGB 7.7 (L) 12/11/2017    HCT 23.1 (L) 12/11/2017    MCV 95 12/11/2017     (L) 12/11/2017       Recent Labs  Lab 12/08/17  0553 12/11/17  0500   TSH 11.467* 17.512*   FREET4 0.53* 0.66*     Lab Results   Component Value Date    HGBA1C <4.0 (A) 10/18/2017 "       Nutritional status:   Body mass index is 28.57 kg/m².  Lab Results   Component Value Date    ALBUMIN 1.7 (L) 12/11/2017    ALBUMIN 2.0 (L) 12/10/2017    ALBUMIN 2.1 (L) 12/09/2017     Lab Results   Component Value Date    PREALBUMIN 5 (L) 10/11/2017       Estimated Creatinine Clearance: 26.7 mL/min (based on SCr of 4.1 mg/dL (H)).    Accu-Checks  No results for input(s): POCTGLUCOSE in the last 72 hours.    Current Medications and/or Treatments Impacting Glycemic Control  Immunotherapy:  Immunosuppressants         Stop Route Frequency     cycloSPORINE modified capsule 125 mg      -- Oral 2 times daily        Steroids:   Hormones     None        Pressors:    Autonomic Drugs     Start     Stop Route Frequency Ordered    12/06/17 1850  midodrine tablet 5 mg      -- Oral Use PRN 12/06/17 1751        Hyperglycemia/Diabetes Medications: Antihyperglycemics     None          ASSESSMENT and PLAN    Hypothyroid    - On initial presentation TSH 11.467; T4 Total <3.0; Free T4 0.53  - DDx includes primary hypothyroidism vs non-thyroidal illness  - Repeat TFTs significant for TSH 17.512, T4, Free 0.66 following 3 days of treatment with IV levothyroxine.  - TPO antibody negative  - Ordered Reverse T3, results pending  - Would recommend continued treatment with levothyroxine 50 mcg IV QD as pt likely to have decrease absorption given anasarca and given his need for tube feeds at present  - Once pt able to take PO, may transition to equivalent PO dose of levothyroxine 75 mcg QD to be given 1 hour prior to breakfast/ AM tube feed  - Will order repeat TSH and Free T4 on 12/15  - Will titrate dose based on lab trends            Biliary stricture of transplanted liver    - Management per primary          Liver transplanted    - Management per primary team            Radhika Sabillon MD  Endocrinology  [facility]

## 2017-12-11 NOTE — PROGRESS NOTES
[facility]  Liver Transplant  Progress Note    Patient Name: Jhonny Diana  MRN: [id number]  Admission Date: 12/3/2017  Hospital Length of Stay: 8 days  Code Status: Full Code  Primary Care Provider: Primary Doctor No  Post-Operative Day: 53    ORGAN:   LIVER  Disease Etiology: Acute Alcoholic Hepatitis  Donor Type:    - Brain Death  CDC High Risk:   No  Donor CMV Status:   Donor CMV Status: Positive  Donor HBcAB:   Negative  Donor HCV Status:   Negative  Whole or Partial: Whole Liver  Biliary Anastomosis: End to End  Arterial Anatomy: Standard  Subjective:     History of Present Illness:  Mr. Diana is a 26yo man w/a history of alcoholic cirrhosis s/p DDLT 10/19/2017; c/b seizures (swtiched off prograf to cyclo), ATN requiring HD (last HD ), superificial wound infection s/p wound vac to chevron incision, and recent admit for fevers on  (discharged on empiric augmentin for suspected superficial wound infection) and readmitted  again with fever. Found to have peritonitis (WBC 5000, 75% PNM) neg for bile leak. He was treated initially with vanc/cefepime. Repeat cell counts  with some improvement (WBC 1400, 45% PNM). Pt transitioned to augmentin upon d/c .      Pt presented to the ED this morning with subjective fever (100.5) and general malaise beginning at 8pm yesterday. His abdomen is soft, but diffusely tender. Labs reveal elevated WBC count to 12.7 from 6.5 in addition to elevated tbili 3.5 from 2.3 when compared to labs 3 days ago, despite being on augmentin for recent diagnosis of peritonitis. He denies nausea, vomiting, diarrhea, congestion, cough, CP or SOB. He has received 1 L bolus NS in the ED. Infectious mosley intiated, will start BS abx (vanc/cef) and obtain CT abdomen.     He has a wound vac in place.     Hospital Course:  Interval History:      : Pt in slightly better spirits today, joking with team. Pt remains with abdominal pain which is improving as  peritonitis continues to resolve (last para ). Bili trending down at 5.2 today, cont ursodiol. PO intake remains poor, will place SHANE tube and initiate tube feeds today, orders placed. Will give albumin x1. Per nephrology will plan for HD today. H/H low on morning labs will plan for 1 unit PRBC with HD. Of note, TSH checked yesterday, level of 11.4 with T4 <3.0, endo consulted- will start levothyroxine today. Cont to encourage PO intake and ambulation. PT ordered 12/7/17, unable to see patient today - will work with pt this weekend. Monitor.     12/9:  Pt with signif nausea and abd pain this AM.  D/c TF.  Place central line and will initiate TPN.  CT A/P obtained today with signif ascites.  Plan IR vs washout in OR.  ID consulted and recommend washout.  Pt transitioned to Zosyn and started on isavu.  Will change PO meds to shane as much as able.      12/10:  Pt with signif improvement today in nausea and feels hungry.  CT A/P 12/9 with signif ascites.  Plan for IR para with drain placement in AM.  Continue Zosyn/Vanc/Isavu.  Will reorder TPN.  D/c shane in AM if not going to transition back to TF.    12/11:  D/c shane.  Continue TPN.  Nausea still improved.  Continue current abx.  Para today with 4.5 L removed.  HD cancelled.  Ascitic fluid labs pending.  Pt NPO after midnight.  Blood/urine cx 12/9 NGTD.    Scheduled Meds:   sodium chloride 0.9%   Intravenous Once    sodium chloride 0.9%   Intravenous Once    atovaquone  1,500 mg Oral Daily    cycloSPORINE modified (NEORAL)  125 mg Oral BID    fat emulsion 20%  250 mL Intravenous Daily    [START ON 12/12/2017] heparin (porcine)  5,000 Units Subcutaneous Q8H    [START ON 12/12/2017] isavuconazonium sulfate  372 mg Oral Daily    levETIRAcetam  500 mg Oral BID    levothyroxine  75 mcg Oral Before breakfast    pantoprazole  40 mg Oral Daily    piperacillin-tazobactam 4.5 g in dextrose 5 % 100 mL IVPB (ready to mix system)  4.5 g Intravenous Q12H     polyethylene glycol  17 g Oral Daily    ursodiol  300 mg Oral BID    [START ON 12/13/2017] valGANciclovir  450 mg Oral Every Mon, Wed, Fri     Continuous Infusions:   TPN ADULT CENTRAL LINE CUSTOM 52 mL/hr at 12/10/17 2119    TPN ADULT CENTRAL LINE CUSTOM       PRN Meds:sodium chloride, sodium chloride 0.9%, sodium chloride 0.9%, acetaminophen, albumin human 25%, bisacodyl, docusate sodium, heparin (porcine), midodrine, ondansetron, ondansetron, oxyCODONE, oxyCODONE, ramelteon, simethicone, sodium chloride 0.9%    Review of Systems   Constitutional: Positive for activity change, appetite change and fatigue. Negative for chills and fever.   Respiratory: Negative for cough, shortness of breath and wheezing.    Cardiovascular: Positive for leg swelling.   Gastrointestinal: Positive for abdominal distention, abdominal pain and constipation. Negative for nausea and vomiting.   Genitourinary: Negative for difficulty urinating and dysuria.   Skin: Positive for wound.   Allergic/Immunologic: Positive for immunocompromised state.   Neurological: Positive for weakness.   Psychiatric/Behavioral: Negative for confusion and decreased concentration. The patient is not nervous/anxious.      Objective:     Vital Signs (Most Recent):  Temp: 99 °F (37.2 °C) (12/11/17 1513)  Pulse: 99 (12/11/17 1513)  Resp: 17 (12/11/17 1513)  BP: (!) 97/52 (12/11/17 1513)  SpO2: 100 % (12/11/17 1513) Vital Signs (24h Range):  Temp:  [98.9 °F (37.2 °C)-99.7 °F (37.6 °C)] 99 °F (37.2 °C)  Pulse:  [] 99  Resp:  [16-20] 17  SpO2:  [97 %-100 %] 100 %  BP: ()/(52-60) 97/52     Weight: 80.3 kg (177 lb 0.5 oz)  Body mass index is 28.57 kg/m².    Intake/Output - Last 3 Shifts       12/09 0700 - 12/10 0659 12/10 0700 - 12/11 0659 12/11 0700 - 12/12 0659    P.O. 500 490     I.V. (mL/kg)   100 (1.2)    Other       NG/ 120     IV Piggyback 750 1000 350    .9 1151 100    Total Intake(mL/kg) 2360.9 (29.8) 2761 (34.4) 550 (6.8)    Urine  (mL/kg/hr) 200 (0.1) 100 (0.1) 30 (0)    Emesis/NG output       Other  50 (0) 4500 (6)    Stool 0 (0) 0 (0)     Blood       Total Output     Net +2160.9 +2611 -3980           Urine Occurrence 2 x 2 x     Stool Occurrence 2 x 3 x           Physical Exam   Constitutional: He is oriented to person, place, and time. He appears well-developed. No distress.   Temporal and distal extremity muscle wasting   HENT:   Head: Normocephalic and atraumatic.   Neck: Normal range of motion. Neck supple. No JVD present.   Cardiovascular: Regular rhythm and normal heart sounds.  Tachycardia present.    No murmur heard.  Pulmonary/Chest: Effort normal. No respiratory distress. He has decreased breath sounds in the right middle field, the right lower field and the left lower field. He has no wheezes. He exhibits no tenderness.   Abdominal: Soft. He exhibits distension. There is tenderness (diffusely ). There is no rebound and no guarding.   Wound vac to incision   Musculoskeletal: Normal range of motion. He exhibits edema (2+ LE edema). He exhibits no tenderness.   Neurological: He is alert and oriented to person, place, and time. He has normal reflexes.   Skin: Skin is warm and dry. He is not diaphoretic.   Psychiatric: His behavior is normal. Judgment and thought content normal. His mood appears anxious.   Pt anxious and expressing feeling overwhelmed   Nursing note and vitals reviewed.      Laboratory:  Immunosuppressants         Stop Route Frequency     cycloSPORINE modified capsule 125 mg      -- Oral 2 times daily        CBC:     Recent Labs  Lab 12/11/17  0500   WBC 3.39*   RBC 2.44*   HGB 7.7*   HCT 23.1*   *   MCV 95   MCH 31.6*   MCHC 33.3     CMP:     Recent Labs  Lab 12/11/17  0500      CALCIUM 7.6*   ALBUMIN 1.7*   PROT 4.5*   *   K 2.9*   CO2 22*      BUN 32*   CREATININE 4.1*   ALKPHOS 90   ALT 5*   AST 14   BILITOT 2.7*     Coagulation:     Recent Labs  Lab 12/11/17  0500   INR 1.1      Cardiac Markers: No results for input(s): CKMB, TROPONINT, MYOGLOBIN in the last 168 hours.  Labs within the past 24 hours have been reviewed.    Diagnostic Results:  I have personally reviewed all pertinent imaging studies.    Assessment/Plan:     * Fever    - See peritonitis  - Blood/urine cx repeated 12/9          Liver transplanted    - A/w fevers and hyperbilirubinemia.   - LFTs improving.  - PBS consulted. ERCP attempted on 12/4 but unable to cannulate. MRCP on 12/4 noted with stricture.  - ERCP 12/6/17  With post-anastamosis stricture, stent placed.   - Para 12/5/17, 3L removed ( down from 1497, cr 5.2, bili 2.5)          LAURA (acute kidney injury)    - Cr level remains elevated  -  Creatinine significantly elevated on 12/6 and HD started.    - Will assess daily need for HD. Appreciate Nephrology recs.   - patient with volume overload but low albumin.     - will give albumin x1   - 2D echo within normal limits  - HD last 12/8.  - Cont strict I/O's.           Long-term use of immunosuppressant medication    Cont cyclo. Monitor for toxicities and adjust dose accordingly          Prophylactic immunotherapy    - See long term immunosuppressant          Hypothyroid    -TSH 11.4  -T4 ,3.0  - Endo consulted. Start levothyroxine 75mcg PO. Instructions for administration given. Appreciate recs.           Malnutrition    - Pt malnourished with poor PO intake  - DYLAN tube placed 12/8/17 and TF started  - Not tolerating TF --> central line placed and TPN started 12/9  - Remove dylan        Biliary stricture of transplanted liver    - Elevated t bili post liver txp in setting of abdominal pain, decreased appetitive and intermittent nausea.  PBS consulted to assess duct per ERCP.  - ERCP performed 12/5 PBS unable to cannulate. MRCP with stricture.  - ERCP 12/6/17 with post-anastamosis stricture with stent placed.   - Tbili peaked at 9.1, now trending down.   -started ursodiol 12/7/17.         Peritonitis    -  Diagnosed on previous admission.  Last para 11/29 with improving cell counts WBC 1400 with 45% PNMs   Was d/c'ed on augmentin, will switch to vanc/cef (see leukocytosis)   - Repeat Paracentesis 12/5/17, with  (improving from 1497)  - CT A/P repeated 12/9 with signif ascites  - Para 12/11 with 4.5 L removed - cell ct pending  - NPO after midnight for potential takeback to OR in AM  - Continue zosyn/vanc/isavu            Anemia of chronic disease    - 1 unit PRBC with HD 12/8/17  - Monitor CBC daily.           Delayed surgical wound healing    - Wound vac in place, last changed 12/6          Seizure    - Cont keppra              VTE Risk Mitigation         Ordered     heparin (porcine) injection 5,000 Units  Every 8 hours     Route:  Subcutaneous        12/11/17 1126     heparin (porcine) injection 1,000 Units  As needed (PRN)     Route:  Intra-Catheter        12/08/17 1736     Medium Risk of VTE  Once      12/03/17 0249     Place sequential compression device  Until discontinued      12/03/17 0249          The patients clinical status was discussed at multidisplinary rounds, involving transplant surgery, transplant medicine, pharmacy, nursing, nutrition, and social work    Discharge Planning:  Monitor kidney function --> may need outpt HD  Monitor HH needs vs rehab for deconditioned status      Geronimo Vera PA-C  Liver Transplant  [facility]

## 2017-12-11 NOTE — ASSESSMENT & PLAN NOTE
- Pt malnourished with poor PO intake  - SHANE tube placed 12/8/17 and TF started  - Not tolerating TF --> central line placed and TPN started 12/9  - Will attempt to eat today as nausea improved

## 2017-12-11 NOTE — PROGRESS NOTES
[facility]  Liver Transplant  Progress Note    Patient Name: Jhonny Diana  MRN: [id number]  Admission Date: 12/3/2017  Hospital Length of Stay: 7 days  Code Status: Full Code  Primary Care Provider: Primary Doctor No  Post-Operative Day: 52    ORGAN:   LIVER  Disease Etiology: Acute Alcoholic Hepatitis  Donor Type:    - Brain Death  CDC High Risk:   No  Donor CMV Status:   Donor CMV Status: Positive  Donor HBcAB:   Negative  Donor HCV Status:   Negative  Whole or Partial: Whole Liver  Biliary Anastomosis: End to End  Arterial Anatomy: Standard  Subjective:     History of Present Illness:  Mr. Diana is a 26yo man w/a history of alcoholic cirrhosis s/p DDLT 10/19/2017; c/b seizures (swtiched off prograf to cyclo), ATN requiring HD (last HD ), superificial wound infection s/p wound vac to chevron incision, and recent admit for fevers on  (discharged on empiric augmentin for suspected superficial wound infection) and readmitted  again with fever. Found to have peritonitis (WBC 5000, 75% PNM) neg for bile leak. He was treated initially with vanc/cefepime. Repeat cell counts  with some improvement (WBC 1400, 45% PNM). Pt transitioned to augmentin upon d/c .      Pt presented to the ED this morning with subjective fever (100.5) and general malaise beginning at 8pm yesterday. His abdomen is soft, but diffusely tender. Labs reveal elevated WBC count to 12.7 from 6.5 in addition to elevated tbili 3.5 from 2.3 when compared to labs 3 days ago, despite being on augmentin for recent diagnosis of peritonitis. He denies nausea, vomiting, diarrhea, congestion, cough, CP or SOB. He has received 1 L bolus NS in the ED. Infectious mosley intiated, will start BS abx (vanc/cef) and obtain CT abdomen.     He has a wound vac in place.     Hospital Course:  Interval History:      : Pt in slightly better spirits today, joking with team. Pt remains with abdominal pain which is improving as  peritonitis continues to resolve (last para ). Bili trending down at 5.2 today, cont ursodiol. PO intake remains poor, will place SHANE tube and initiate tube feeds today, orders placed. Will give albumin x1. Per nephrology will plan for HD today. H/H low on morning labs will plan for 1 unit PRBC with HD. Of note, TSH checked yesterday, level of 11.4 with T4 <3.0, endo consulted- will start levothyroxine today. Cont to encourage PO intake and ambulation. PT ordered 12/7/17, unable to see patient today - will work with pt this weekend. Monitor.     12/9:  Pt with signif nausea and abd pain this AM.  D/c TF.  Place central line and will initiate TPN.  CT A/P obtained today with signif ascites.  Plan IR vs washout in OR.  ID consulted and recommend washout.  Pt transitioned to Zosyn and started on isavu.  Will change PO meds to shane as much as able.      12/10:  Pt with signif improvement today in nausea and feels hungry.  CT A/P 12/9 with signif ascites.  Plan for IR para with drain placement in AM.  Continue Zosyn/Vanc/Isavu.  Will reorder TPN.  D/c shane in AM if not going to transition back to TF.    Scheduled Meds:   sodium chloride 0.9%   Intravenous Once    atovaquone  1,500 mg Oral Daily    cycloSPORINE modified (NEORAL)  125 mg Oral BID    docusate  100 mg Oral TID    ergocalciferol  50,000 Units Oral Q7 Days    fat emulsion 20%  250 mL Intravenous Daily    heparin (porcine)  5,000 Units Subcutaneous Q8H    custom IVPB builder   Intravenous Q8H    [START ON 12/12/2017] custom IVPB builder   Intravenous Daily    levETIRAcetam in NaCl (iso-os)  500 mg Intravenous BID    levothyroxine  75 mcg Oral Before breakfast    multivitamin  1 tablet Oral Daily    pantoprazole  40 mg Oral Daily    piperacillin-tazobactam 4.5 g in dextrose 5 % 100 mL IVPB (ready to mix system)  4.5 g Intravenous Q12H    polyethylene glycol  17 g Oral Daily    sodium bicarbonate  1,300 mg Oral TID    ursodiol  300 mg Oral  BID    [START ON 12/11/2017] valganciclovir 50 mg/ml  450 mg Oral Every Mon, Wed, Fri     Continuous Infusions:   TPN ADULT CENTRAL LINE CUSTOM 50 mL/hr at 12/09/17 2249    TPN ADULT CENTRAL LINE CUSTOM       PRN Meds:sodium chloride, sodium chloride 0.9%, acetaminophen, albumin human 25%, bisacodyl, heparin (porcine), midodrine, ondansetron, ondansetron, oxyCODONE, oxyCODONE, ramelteon, simethicone, sodium chloride 0.9%, sodium chloride 0.9%    Review of Systems   Constitutional: Positive for activity change, appetite change and fatigue. Negative for chills and fever.   Respiratory: Negative for cough, shortness of breath and wheezing.    Cardiovascular: Positive for leg swelling.   Gastrointestinal: Positive for abdominal distention, abdominal pain and constipation. Negative for nausea and vomiting.   Genitourinary: Negative for difficulty urinating and dysuria.   Skin: Positive for wound.   Allergic/Immunologic: Positive for immunocompromised state.   Neurological: Positive for weakness.   Psychiatric/Behavioral: Negative for confusion and decreased concentration. The patient is not nervous/anxious.      Objective:     Vital Signs (Most Recent):  Temp: 99.7 °F (37.6 °C) (12/10/17 1526)  Pulse: 95 (12/10/17 1800)  Resp: 18 (12/10/17 1526)  BP: 100/62 (12/10/17 1526)  SpO2: 98 % (12/10/17 1526) Vital Signs (24h Range):  Temp:  [98.8 °F (37.1 °C)-99.8 °F (37.7 °C)] 99.7 °F (37.6 °C)  Pulse:  [] 95  Resp:  [18] 18  SpO2:  [97 %-99 %] 98 %  BP: (100-115)/(59-68) 100/62     Weight: 79.2 kg (174 lb 9.7 oz)  Body mass index is 28.18 kg/m².    Intake/Output - Last 3 Shifts       12/08 0700 - 12/09 0659 12/09 0700 - 12/10 0659 12/10 0700 - 12/11 0659    P.O. 420 500 400    I.V. (mL/kg) 50 (0.6)      Other 600      NG/ 600     IV Piggyback 50 750 550    TPN  510.9 499.6    Total Intake(mL/kg) 1440 (18.2) 2360.9 (29.8) 1449.6 (18.3)    Urine (mL/kg/hr) 625 (0.3) 200 (0.1) 100 (0.1)    Emesis/NG output 0 (0)       Other 1600 (0.8)      Stool 0 (0) 0 (0) 0 (0)    Blood 0 (0)      Total Output 2225 200 100    Net -785 +2160.9 +1349.6           Urine Occurrence 0 x 2 x     Stool Occurrence 0 x 2 x 1 x          Physical Exam   Constitutional: He is oriented to person, place, and time. He appears well-developed. No distress.   Temporal and distal extremity muscle wasting   HENT:   Head: Normocephalic and atraumatic.   Neck: Normal range of motion. Neck supple. No JVD present.   Cardiovascular: Regular rhythm and normal heart sounds.  Tachycardia present.    No murmur heard.  Pulmonary/Chest: Effort normal. No respiratory distress. He has decreased breath sounds in the right middle field, the right lower field and the left lower field. He has no wheezes. He exhibits no tenderness.   Abdominal: Soft. He exhibits distension. There is tenderness (diffusely ). There is no rebound and no guarding.   Wound vac to incision   Musculoskeletal: Normal range of motion. He exhibits edema (2+ LE edema). He exhibits no tenderness.   Neurological: He is alert and oriented to person, place, and time. He has normal reflexes.   Skin: Skin is warm and dry. He is not diaphoretic.   Psychiatric: His behavior is normal. Judgment and thought content normal. His mood appears anxious.   Pt anxious and expressing feeling overwhelmed   Nursing note and vitals reviewed.      Laboratory:  Immunosuppressants         Stop Route Frequency     cycloSPORINE modified capsule 125 mg      -- Oral 2 times daily        CBC:     Recent Labs  Lab 12/10/17  0500   WBC 3.89*   RBC 2.61*   HGB 8.1*   HCT 24.1*   *   MCV 92   MCH 31.0   MCHC 33.6     CMP:     Recent Labs  Lab 12/10/17  0500   *   CALCIUM 7.7*   ALBUMIN 2.0*   PROT 4.7*   *   K 3.1*   CO2 24      BUN 25*   CREATININE 3.4*   ALKPHOS 102   ALT 6*   AST 15   BILITOT 3.5*     Coagulation:     Recent Labs  Lab 12/10/17  0500   INR 1.2     Cardiac Markers: No results for input(s): CKMB,  TROPONINT, MYOGLOBIN in the last 168 hours.  Labs within the past 24 hours have been reviewed.    Diagnostic Results:  I have personally reviewed all pertinent imaging studies.    Assessment/Plan:     * Fever    - See peritonitis  - Blood/urine cx repeated 12/9          Liver transplanted    - A/w fevers and hyperbilirubinemia.   - LFTs improving.  - PBS consulted. ERCP attempted on 12/4 but unable to cannulate. MRCP on 12/4 noted with stricture.  - ERCP 12/6/17  With post-anastamosis stricture, stent placed.   - Para 12/5/17, 3L removed ( down from 1497, cr 5.2, bili 2.5)          LAURA (acute kidney injury)    - Cr level remains elevated  -  Creatinine significantly elevated on 12/6 and HD started.    - Will assess daily need for HD. Appreciate Nephrology recs.   - patient with volume overload but low albumin.     - will give albumin x1   - 2D echo within normal limits  - HD last 12/8.  - Cont strict I/O's.           Long-term use of immunosuppressant medication    Cont cyclo. Monitor for toxicities and adjust dose accordingly          Prophylactic immunotherapy    - See long term immunosuppressant          Hypothyroid    -TSH 11.4  -T4 ,3.0  - Endo consulted. Start levothyroxine 75mcg PO. Instructions for administration given. Appreciate recs.           Malnutrition    - Pt malnourished with poor PO intake  - SAHNE tube placed 12/8/17 and TF started  - Not tolerating TF --> central line placed and TPN started 12/9  - Will attempt to eat today as nausea improved          Biliary stricture of transplanted liver    - Elevated t bili post liver txp in setting of abdominal pain, decreased appetitive and intermittent nausea.  PBS consulted to assess duct per ERCP.  - ERCP performed 12/5 PBS unable to cannulate. MRCP with stricture.  - ERCP 12/6/17 with post-anastamosis stricture with stent placed.   - Tbili peaked at 9.1, now trending down.   -started ursodiol 12/7/17.         Peritonitis    - Diagnosed on previous  admission.  Last para 11/29 with improving cell counts WBC 1400 with 45% PNMs   Was d/c'ed on augmentin, will switch to vanc/cef (see leukocytosis)   - Repeat Paracentesis 12/5/17, with  (improving from 1497)  - CT A/P repeated 12/9 with signif ascites  - NPO after midnight with plan for takeback or para in AM  - Continue zosyn/vanc/isavu            Anemia of chronic disease    - 1 unit PRBC with HD 12/8/17  - Monitor CBC daily.           Delayed surgical wound healing    - Wound vac in place, last changed 12/6          Seizure    - Cont keppra              VTE Risk Mitigation         Ordered     heparin (porcine) injection 1,000 Units  As needed (PRN)     Route:  Intra-Catheter        12/08/17 1736     heparin (porcine) injection 5,000 Units  Every 8 hours     Route:  Subcutaneous        12/04/17 0933     Medium Risk of VTE  Once      12/03/17 0249     Place sequential compression device  Until discontinued      12/03/17 0249          The patients clinical status was discussed at multidisplinary rounds, involving transplant surgery, transplant medicine, pharmacy, nursing, nutrition, and social work    Discharge Planning:  Monitor kidney function --> may need outpt HD  Monitor HH needs vs rehab for deconditioned status      Geronimo Vera PA-C  Liver Transplant  [facility]

## 2017-12-11 NOTE — H&P
Radiology Consult    Jhonny Diana is a 27 y.o. male with a history of peritonitis post transplant.  Past Medical History:   Diagnosis Date    Alcoholic hepatitis with ascites     Alcoholic hepatitis with ascites     History of hematemesis 9/28/2017    Hypertension     Renal disorder      Past Surgical History:   Procedure Laterality Date    APPENDECTOMY         Discussed with primary team.    Imaging reviewed with Radiology staff, Dr. Rose.     Procedure: abdominal drain placement.    Scheduled Meds:    sodium chloride 0.9%   Intravenous Once    atovaquone  1,500 mg Oral Daily    cycloSPORINE modified (NEORAL)  125 mg Oral BID    docusate  100 mg Oral TID    ergocalciferol  50,000 Units Oral Q7 Days    fat emulsion 20%  250 mL Intravenous Daily    heparin (porcine)  5,000 Units Subcutaneous Q8H    custom IVPB builder   Intravenous Q8H    [START ON 12/12/2017] custom IVPB builder   Intravenous Daily    levETIRAcetam in NaCl (iso-os)  500 mg Intravenous BID    levothyroxine  75 mcg Oral Before breakfast    multivitamin  1 tablet Oral Daily    pantoprazole  40 mg Oral Daily    piperacillin-tazobactam 4.5 g in dextrose 5 % 100 mL IVPB (ready to mix system)  4.5 g Intravenous Q12H    polyethylene glycol  17 g Oral Daily    potassium chloride 10%  40 mEq Oral Once    sodium bicarbonate  1,300 mg Oral TID    ursodiol  300 mg Oral BID    valganciclovir 50 mg/ml  450 mg Oral Every Mon, Wed, Fri     Continuous Infusions:    TPN ADULT CENTRAL LINE CUSTOM 52 mL/hr at 12/10/17 2119     PRN Meds:sodium chloride, sodium chloride 0.9%, acetaminophen, albumin human 25%, bisacodyl, heparin (porcine), midodrine, ondansetron, ondansetron, oxyCODONE, oxyCODONE, ramelteon, simethicone, sodium chloride 0.9%, sodium chloride 0.9%    Allergies:   Review of patient's allergies indicates:   Allergen Reactions    Bactrim [sulfamethoxazole-trimethoprim] Other (See Comments)     Mookie Trell Syndrome        Labs:    Recent Labs  Lab 12/11/17  0500   INR 1.1       Recent Labs  Lab 12/11/17  0500   WBC 3.39*   HGB 7.7*   HCT 23.1*   MCV 95   *      Recent Labs  Lab 12/05/17  0559  12/11/17  0500   GLU 79  < > 106   *  < > 132*   K 3.6  < > 2.9*   CL 97  < > 100   CO2 18*  < > 22*   BUN 49*  < > 32*   CREATININE 4.7*  < > 4.1*   CALCIUM 7.9*  < > 7.6*   MG 1.8  < > 1.5*   ALT <5*  < > 5*   AST 13  < > 14   ALBUMIN 2.0*  < > 1.7*   BILITOT 6.2*  < > 2.7*   BILIDIR 4.7*  --   --    < > = values in this interval not displayed.      Vitals (Most Recent):  Temp: 99.7 °F (37.6 °C) (12/11/17 0801)  Pulse: 100 (12/11/17 0801)  Resp: 18 (12/11/17 0801)  BP: (!) 104/59 (12/11/17 0801)  SpO2: 97 % (12/11/17 0801)    Plan:   1. NPO.  2. Hold anticoagulants.  3. Pt scheduled for abdominal drain placement for ascites and peritonitis.    Rajesh Torres MD  Radiology

## 2017-12-11 NOTE — SUBJECTIVE & OBJECTIVE
Scheduled Meds:   sodium chloride 0.9%   Intravenous Once    sodium chloride 0.9%   Intravenous Once    atovaquone  1,500 mg Oral Daily    cycloSPORINE modified (NEORAL)  125 mg Oral BID    fat emulsion 20%  250 mL Intravenous Daily    [START ON 12/12/2017] heparin (porcine)  5,000 Units Subcutaneous Q8H    [START ON 12/12/2017] isavuconazonium sulfate  372 mg Oral Daily    levETIRAcetam  500 mg Oral BID    levothyroxine  75 mcg Oral Before breakfast    pantoprazole  40 mg Oral Daily    piperacillin-tazobactam 4.5 g in dextrose 5 % 100 mL IVPB (ready to mix system)  4.5 g Intravenous Q12H    polyethylene glycol  17 g Oral Daily    ursodiol  300 mg Oral BID    [START ON 12/13/2017] valGANciclovir  450 mg Oral Every Mon, Wed, Fri     Continuous Infusions:   TPN ADULT CENTRAL LINE CUSTOM 52 mL/hr at 12/10/17 2119    TPN ADULT CENTRAL LINE CUSTOM       PRN Meds:sodium chloride, sodium chloride 0.9%, sodium chloride 0.9%, acetaminophen, albumin human 25%, bisacodyl, docusate sodium, heparin (porcine), midodrine, ondansetron, ondansetron, oxyCODONE, oxyCODONE, ramelteon, simethicone, sodium chloride 0.9%    Review of Systems   Constitutional: Positive for activity change, appetite change and fatigue. Negative for chills and fever.   Respiratory: Negative for cough, shortness of breath and wheezing.    Cardiovascular: Positive for leg swelling.   Gastrointestinal: Positive for abdominal distention, abdominal pain and constipation. Negative for nausea and vomiting.   Genitourinary: Negative for difficulty urinating and dysuria.   Skin: Positive for wound.   Allergic/Immunologic: Positive for immunocompromised state.   Neurological: Positive for weakness.   Psychiatric/Behavioral: Negative for confusion and decreased concentration. The patient is not nervous/anxious.      Objective:     Vital Signs (Most Recent):  Temp: 99 °F (37.2 °C) (12/11/17 1513)  Pulse: 99 (12/11/17 1513)  Resp: 17 (12/11/17 1513)  BP:  (!) 97/52 (12/11/17 1513)  SpO2: 100 % (12/11/17 1513) Vital Signs (24h Range):  Temp:  [98.9 °F (37.2 °C)-99.7 °F (37.6 °C)] 99 °F (37.2 °C)  Pulse:  [] 99  Resp:  [16-20] 17  SpO2:  [97 %-100 %] 100 %  BP: ()/(52-60) 97/52     Weight: 80.3 kg (177 lb 0.5 oz)  Body mass index is 28.57 kg/m².    Intake/Output - Last 3 Shifts       12/09 0700 - 12/10 0659 12/10 0700 - 12/11 0659 12/11 0700 - 12/12 0659    P.O. 500 490     I.V. (mL/kg)   100 (1.2)    Other       NG/ 120     IV Piggyback 750 1000 350    .9 1151 100    Total Intake(mL/kg) 2360.9 (29.8) 2761 (34.4) 550 (6.8)    Urine (mL/kg/hr) 200 (0.1) 100 (0.1) 30 (0)    Emesis/NG output       Other  50 (0) 4500 (6)    Stool 0 (0) 0 (0)     Blood       Total Output     Net +2160.9 +2611 -3980           Urine Occurrence 2 x 2 x     Stool Occurrence 2 x 3 x           Physical Exam   Constitutional: He is oriented to person, place, and time. He appears well-developed. No distress.   Temporal and distal extremity muscle wasting   HENT:   Head: Normocephalic and atraumatic.   Neck: Normal range of motion. Neck supple. No JVD present.   Cardiovascular: Regular rhythm and normal heart sounds.  Tachycardia present.    No murmur heard.  Pulmonary/Chest: Effort normal. No respiratory distress. He has decreased breath sounds in the right middle field, the right lower field and the left lower field. He has no wheezes. He exhibits no tenderness.   Abdominal: Soft. He exhibits distension. There is tenderness (diffusely ). There is no rebound and no guarding.   Wound vac to incision   Musculoskeletal: Normal range of motion. He exhibits edema (2+ LE edema). He exhibits no tenderness.   Neurological: He is alert and oriented to person, place, and time. He has normal reflexes.   Skin: Skin is warm and dry. He is not diaphoretic.   Psychiatric: His behavior is normal. Judgment and thought content normal. His mood appears anxious.   Pt anxious and  expressing feeling overwhelmed   Nursing note and vitals reviewed.      Laboratory:  Immunosuppressants         Stop Route Frequency     cycloSPORINE modified capsule 125 mg      -- Oral 2 times daily        CBC:     Recent Labs  Lab 12/11/17  0500   WBC 3.39*   RBC 2.44*   HGB 7.7*   HCT 23.1*   *   MCV 95   MCH 31.6*   MCHC 33.3     CMP:     Recent Labs  Lab 12/11/17  0500      CALCIUM 7.6*   ALBUMIN 1.7*   PROT 4.5*   *   K 2.9*   CO2 22*      BUN 32*   CREATININE 4.1*   ALKPHOS 90   ALT 5*   AST 14   BILITOT 2.7*     Coagulation:     Recent Labs  Lab 12/11/17  0500   INR 1.1     Cardiac Markers: No results for input(s): CKMB, TROPONINT, MYOGLOBIN in the last 168 hours.  Labs within the past 24 hours have been reviewed.    Diagnostic Results:  I have personally reviewed all pertinent imaging studies.

## 2017-12-11 NOTE — PROGRESS NOTES
[facility]  Infectious Disease  Progress Note    Patient Name: Jhonny Diana  MRN: [id number]  Admission Date: 12/3/2017  Length of Stay: 7 days  Attending Physician: Solis Chapa MD  Primary Care Provider: Primary Doctor No    Isolation Status: No active isolations  Assessment/Plan:      Peritonitis     27-year-old male  - Alcoholic cirrhosis (c/b HE, EV, portal HTN, and HRS)  - s/p DDLT 10/19/2017, CMV D+/R+, steroid induction, on maintenance tacro/MMF/pred  - c/b seizures, transitioned from tacro to cyclo  - Superificial wound infection s/p wound vac to chevron   - Readmitted 11/21/2017 for suspected wound infection treated with amox-clav   - Readmitted 11/24/2017 with peritonitis (WBC 5000) discharged on amox-clav  - Biliary stricture s/p sphincterotomy, biliary stent placement in CBD 12/6/2017  - Peritonitis      Patient with resolution of nausea and vomiting s/p biliary stent placement. Patient is ongoing abdominal pain - repeat paracentesis with fluctuating PMN while on antibiotics (3700->82->665->312)      Recommendations:  - Continue pip-tazo for anaerobic coverage  - Continue isavuconazole for coverage of Candida and mold prophylaxis   - Agree with abdominal washout for source control of ongoing peritonitis after 2 weeks of antibiotic therapy            Anticipated Disposition: Resolution of peritonitis    Thank you for your consult. I will follow-up with patient. Please contact us if you have any additional questions.    Marcela Longoria MD  Infectious Disease  [facility]    Subjective:     Principal Problem:Fever    HPI: Case of 26 y/o male PMHx Asthma, Cirrosis 2ry to Etoh abuse, DDLT 10/19/17  Steroid induction, CMV D+/R+. Maintenance tacro/mmf/pred. Complicated with seizures, LAURA, superficial wound infection with wound vac to chevron and peritonitis. Past admission include:     - 11/21/17 Wound infection with wound vac placement D/H on augmentin  - 11/24/17 admit for  peritonitis with therapeutic and diagnostic paracentesis. D/H on Augmentin for 2 weeks        Admitted on 12/3/17 patient presented on this occasion to the ED with subjective fevers 100.5, general malaise since 2 days prior to evaluation. Had also diffuse tenderness of abdomen, nausea. Denied emesis, diarrhea, congestion, cough. On 12/6/17 ERCP was done and stent was place to correct stricture. At time had elevation of WBC of 12 from 6 and elevation of tbili with peak at 9. Both trending down but patient continues with general malaise and no improvement in symptoms since ERCP. ID consulted regarding fungal prophylaxis and antibiotic recommendations.    Interval History:   Reports nausea and vomiting have resolves since biliary stent placement  Still having abdominal pain   No fevers    Review of Systems   Constitutional: Negative for chills, diaphoresis and fever.   HENT: Negative for congestion, rhinorrhea, sinus pain and sore throat.    Respiratory: Negative for cough and shortness of breath.    Cardiovascular: Negative for chest pain and leg swelling.   Gastrointestinal: Positive for abdominal pain. Negative for diarrhea, nausea and vomiting.   Musculoskeletal: Negative for arthralgias, back pain, joint swelling and myalgias.   Skin: Negative for rash and wound.   Neurological: Negative for light-headedness and headaches.   Hematological: Negative for adenopathy.     Objective:     Vital Signs (Most Recent):  Temp: 99.7 °F (37.6 °C) (12/10/17 1526)  Pulse: 95 (12/10/17 1800)  Resp: 18 (12/10/17 1526)  BP: 100/62 (12/10/17 1526)  SpO2: 98 % (12/10/17 1526) Vital Signs (24h Range):  Temp:  [98.8 °F (37.1 °C)-99.8 °F (37.7 °C)] 99.7 °F (37.6 °C)  Pulse:  [] 95  Resp:  [18] 18  SpO2:  [97 %-99 %] 98 %  BP: (100-115)/(59-68) 100/62     Weight: 79.2 kg (174 lb 9.7 oz)  Body mass index is 28.18 kg/m².    Estimated Creatinine Clearance: 32 mL/min (based on SCr of 3.4 mg/dL (H)).    Physical Exam   Constitutional:  He is oriented to person, place, and time. He appears well-developed and well-nourished. No distress.   HENT:   Head: Normocephalic and atraumatic.   NG tube in place   Eyes: Conjunctivae and EOM are normal.   Neck: Normal range of motion. Neck supple.   Cardiovascular: Normal rate.    Pulmonary/Chest: Effort normal. No respiratory distress.   Abdominal: He exhibits distension. There is tenderness.   Musculoskeletal: Normal range of motion. He exhibits no edema.   Neurological: He is alert and oriented to person, place, and time.   Skin: Skin is warm and dry. No rash noted. He is not diaphoretic. No erythema.   Psychiatric: He has a normal mood and affect. His behavior is normal.   Vitals reviewed.      Significant Labs: All pertinent labs within the past 24 hours have been reviewed.    Significant Imaging: I have reviewed all pertinent imaging results/findings within the past 24 hours.

## 2017-12-11 NOTE — ASSESSMENT & PLAN NOTE
27-year-old male  - Alcoholic cirrhosis (c/b HE, EV, portal HTN, and HRS)  - s/p DDLT 10/19/2017, CMV D+/R+, steroid induction, on maintenance tacro/MMF/pred  - c/b seizures, transitioned from tacro to cyclo  - Superificial wound infection s/p wound vac to chevron   - Readmitted 11/21/2017 for suspected wound infection treated with amox-clav   - Readmitted 11/24/2017 with peritonitis (WBC 5000) discharged on amox-clav  - Biliary stricture s/p sphincterotomy, biliary stent placement in CBD 12/6/2017  - Peritonitis      Patient with resolution of nausea and vomiting s/p biliary stent placement. Patient is ongoing abdominal pain - repeat paracentesis with fluctuating PMN while on antibiotics ([id number]->[id number]->312)      Recommendations:  - Continue pip-tazo for anaerobic coverage  - Continue isavuconazole for coverage of Candida and mold prophylaxis   - Agree with abdominal washout for source control of ongoing peritonitis after 2 weeks of antibiotic therapy

## 2017-12-11 NOTE — SUBJECTIVE & OBJECTIVE
Scheduled Meds:   sodium chloride 0.9%   Intravenous Once    atovaquone  1,500 mg Oral Daily    cycloSPORINE modified (NEORAL)  125 mg Oral BID    docusate  100 mg Oral TID    ergocalciferol  50,000 Units Oral Q7 Days    fat emulsion 20%  250 mL Intravenous Daily    heparin (porcine)  5,000 Units Subcutaneous Q8H    custom IVPB builder   Intravenous Q8H    [START ON 12/12/2017] custom IVPB builder   Intravenous Daily    levETIRAcetam in NaCl (iso-os)  500 mg Intravenous BID    levothyroxine  75 mcg Oral Before breakfast    multivitamin  1 tablet Oral Daily    pantoprazole  40 mg Oral Daily    piperacillin-tazobactam 4.5 g in dextrose 5 % 100 mL IVPB (ready to mix system)  4.5 g Intravenous Q12H    polyethylene glycol  17 g Oral Daily    sodium bicarbonate  1,300 mg Oral TID    ursodiol  300 mg Oral BID    [START ON 12/11/2017] valganciclovir 50 mg/ml  450 mg Oral Every Mon, Wed, Fri     Continuous Infusions:   TPN ADULT CENTRAL LINE CUSTOM 50 mL/hr at 12/09/17 2249    TPN ADULT CENTRAL LINE CUSTOM       PRN Meds:sodium chloride, sodium chloride 0.9%, acetaminophen, albumin human 25%, bisacodyl, heparin (porcine), midodrine, ondansetron, ondansetron, oxyCODONE, oxyCODONE, ramelteon, simethicone, sodium chloride 0.9%, sodium chloride 0.9%    Review of Systems   Constitutional: Positive for activity change, appetite change and fatigue. Negative for chills and fever.   Respiratory: Negative for cough, shortness of breath and wheezing.    Cardiovascular: Positive for leg swelling.   Gastrointestinal: Positive for abdominal distention, abdominal pain and constipation. Negative for nausea and vomiting.   Genitourinary: Negative for difficulty urinating and dysuria.   Skin: Positive for wound.   Allergic/Immunologic: Positive for immunocompromised state.   Neurological: Positive for weakness.   Psychiatric/Behavioral: Negative for confusion and decreased concentration. The patient is not nervous/anxious.       Objective:     Vital Signs (Most Recent):  Temp: 99.7 °F (37.6 °C) (12/10/17 1526)  Pulse: 95 (12/10/17 1800)  Resp: 18 (12/10/17 1526)  BP: 100/62 (12/10/17 1526)  SpO2: 98 % (12/10/17 1526) Vital Signs (24h Range):  Temp:  [98.8 °F (37.1 °C)-99.8 °F (37.7 °C)] 99.7 °F (37.6 °C)  Pulse:  [] 95  Resp:  [18] 18  SpO2:  [97 %-99 %] 98 %  BP: (100-115)/(59-68) 100/62     Weight: 79.2 kg (174 lb 9.7 oz)  Body mass index is 28.18 kg/m².    Intake/Output - Last 3 Shifts       12/08 0700 - 12/09 0659 12/09 0700 - 12/10 0659 12/10 0700 - 12/11 0659    P.O. 420 500 400    I.V. (mL/kg) 50 (0.6)      Other 600      NG/ 600     IV Piggyback 50 750 550    TPN  510.9 499.6    Total Intake(mL/kg) 1440 (18.2) 2360.9 (29.8) 1449.6 (18.3)    Urine (mL/kg/hr) 625 (0.3) 200 (0.1) 100 (0.1)    Emesis/NG output 0 (0)      Other 1600 (0.8)      Stool 0 (0) 0 (0) 0 (0)    Blood 0 (0)      Total Output 2225 200 100    Net -785 +2160.9 +1349.6           Urine Occurrence 0 x 2 x     Stool Occurrence 0 x 2 x 1 x          Physical Exam   Constitutional: He is oriented to person, place, and time. He appears well-developed. No distress.   Temporal and distal extremity muscle wasting   HENT:   Head: Normocephalic and atraumatic.   Neck: Normal range of motion. Neck supple. No JVD present.   Cardiovascular: Regular rhythm and normal heart sounds.  Tachycardia present.    No murmur heard.  Pulmonary/Chest: Effort normal. No respiratory distress. He has decreased breath sounds in the right middle field, the right lower field and the left lower field. He has no wheezes. He exhibits no tenderness.   Abdominal: Soft. He exhibits distension. There is tenderness (diffusely ). There is no rebound and no guarding.   Wound vac to incision   Musculoskeletal: Normal range of motion. He exhibits edema (2+ LE edema). He exhibits no tenderness.   Neurological: He is alert and oriented to person, place, and time. He has normal reflexes.   Skin:  Skin is warm and dry. He is not diaphoretic.   Psychiatric: His behavior is normal. Judgment and thought content normal. His mood appears anxious.   Pt anxious and expressing feeling overwhelmed   Nursing note and vitals reviewed.      Laboratory:  Immunosuppressants         Stop Route Frequency     cycloSPORINE modified capsule 125 mg      -- Oral 2 times daily        CBC:     Recent Labs  Lab 12/10/17  0500   WBC 3.89*   RBC 2.61*   HGB 8.1*   HCT 24.1*   *   MCV 92   MCH 31.0   MCHC 33.6     CMP:     Recent Labs  Lab 12/10/17  0500   *   CALCIUM 7.7*   ALBUMIN 2.0*   PROT 4.7*   *   K 3.1*   CO2 24      BUN 25*   CREATININE 3.4*   ALKPHOS 102   ALT 6*   AST 15   BILITOT 3.5*     Coagulation:     Recent Labs  Lab 12/10/17  0500   INR 1.2     Cardiac Markers: No results for input(s): CKMB, TROPONINT, MYOGLOBIN in the last 168 hours.  Labs within the past 24 hours have been reviewed.    Diagnostic Results:  I have personally reviewed all pertinent imaging studies.

## 2017-12-11 NOTE — SUBJECTIVE & OBJECTIVE
"Interval HPI:   Overnight events: NAEON.    BP (!) 104/59 (BP Location: Left arm, Patient Position: Lying)   Pulse 98   Temp 99.7 °F (37.6 °C) (Oral)   Resp 18   Ht 5' 6" (1.676 m)   Wt 80.3 kg (177 lb 0.5 oz)   SpO2 98%   BMI 28.57 kg/m²     Labs Reviewed and Include      Recent Labs  Lab 12/11/17  0500      CALCIUM 7.6*   ALBUMIN 1.7*   PROT 4.5*   *   K 2.9*   CO2 22*      BUN 32*   CREATININE 4.1*   ALKPHOS 90   ALT 5*   AST 14   BILITOT 2.7*     Lab Results   Component Value Date    WBC 3.39 (L) 12/11/2017    HGB 7.7 (L) 12/11/2017    HCT 23.1 (L) 12/11/2017    MCV 95 12/11/2017     (L) 12/11/2017       Recent Labs  Lab 12/08/17  0553 12/11/17  0500   TSH 11.467* 17.512*   FREET4 0.53* 0.66*     Lab Results   Component Value Date    HGBA1C <4.0 (A) 10/18/2017       Nutritional status:   Body mass index is 28.57 kg/m².  Lab Results   Component Value Date    ALBUMIN 1.7 (L) 12/11/2017    ALBUMIN 2.0 (L) 12/10/2017    ALBUMIN 2.1 (L) 12/09/2017     Lab Results   Component Value Date    PREALBUMIN 5 (L) 10/11/2017       Estimated Creatinine Clearance: 26.7 mL/min (based on SCr of 4.1 mg/dL (H)).    Accu-Checks  No results for input(s): POCTGLUCOSE in the last 72 hours.    Current Medications and/or Treatments Impacting Glycemic Control  Immunotherapy:  Immunosuppressants         Stop Route Frequency     cycloSPORINE modified capsule 125 mg      -- Oral 2 times daily        Steroids:   Hormones     None        Pressors:    Autonomic Drugs     Start     Stop Route Frequency Ordered    12/06/17 1850  midodrine tablet 5 mg      -- Oral Use PRN 12/06/17 1751        Hyperglycemia/Diabetes Medications: Antihyperglycemics     None        "

## 2017-12-11 NOTE — PLAN OF CARE
Problem: Patient Care Overview  Goal: Plan of Care Review  Pt free from fall or injury so far this shift. Instructed to call if assistance needed, verbalized understanding.   Cardiac monitoring in progress, currently SR.  TPN and lipids continued. TPN rate to be decreased to 45 cc/hr tonight.   k 2.9 today, IV and PO replacement given.   Creat 4.1 today, unable to perform HD today due to paracentesis fluid removal, plan for HD tomorrow.   Diag/Thera Para today, 4.5L removed. Albumin x1 given, post Para.   No skin breakdown noted. Pt able to adjust position independently.   Afebrile. Zosyn continued. Daily labs monitored.   Pt NPO after MN for possible procedure tomorrow.

## 2017-12-11 NOTE — H&P
Inpatient Radiology Pre-procedure Note    History of Present Illness:  Jhonny Diana is a 27 y.o. male who presents for ultrasound guided paracentesis.  Admission H&P reviewed.  Past Medical History:   Diagnosis Date    Alcoholic hepatitis with ascites     Alcoholic hepatitis with ascites     History of hematemesis 9/28/2017    Hypertension     Renal disorder      Past Surgical History:   Procedure Laterality Date    APPENDECTOMY         Review of Systems:   As documented in primary team H&P    Home Meds:   Prior to Admission medications    Medication Sig Start Date End Date Taking? Authorizing Provider   amoxicillin-clavulanate 500-125mg (AUGMENTIN) 500-125 mg Tab Take 1 tablet (500 mg total) by mouth 2 (two) times daily. 11/30/17   Solis Chapa MD   aspirin (ECOTRIN) 81 MG EC tablet Take 1 tablet (81 mg total) by mouth once daily. 11/8/17   Nathanael Medina MD   atovaquone (MEPRON) 750 mg/5 mL Susp Take 10 mLs (1,500 mg total) by mouth once daily. Stop on 4/17/18 10/19/17 4/17/18  Nathanael Medina MD   cycloSPORINE modified, NEORAL, (NEORAL) 100 MG capsule Take 1 capsule (100 mg total) by mouth 2 (two) times daily. 11/30/17   Solis Chapa MD   docusate sodium (COLACE) 100 MG capsule Take 100 mg by mouth 3 (three) times daily as needed for Constipation.    Historical Provider, MD   ergocalciferol (ERGOCALCIFEROL) 50,000 unit Cap Take 1 capsule (50,000 Units total) by mouth every 7 days. 11/7/17   Nathanael Medina MD   levETIRAcetam (KEPPRA) 500 MG Tab Take 1 tablet (500 mg total) by mouth 2 (two) times daily. 11/7/17 11/7/18  Nathanael Medina MD   multivitamin (THERAGRAN) tablet Take 1 tablet by mouth once daily. 11/7/17   Nathanael Medina MD   mycophenolate (CELLCEPT) 250 mg Cap Take 2 capsules (500 mg total) by mouth 2 (two) times daily. 11/21/17   Alin López Jr., MD   ondansetron (ZOFRAN-ODT) 8 MG TbDL Take 1 tablet (8 mg total) by mouth every 8 (eight) hours as needed (nausea). 11/9/17   Nathanael Medina MD    oxyCODONE-acetaminophen (PERCOCET)  mg per tablet Take 0.5-1 tablets by mouth every 4 (four) hours as needed for Pain. 11/30/17   Geronimo Vera PA-C   pantoprazole (PROTONIX) 40 MG tablet Take 1 tablet (40 mg total) by mouth once daily. 11/8/17   Nathanael Medina MD   predniSONE (DELTASONE) 10 MG tablet Take 20mg PO QD 10/29-11/4; 15mg PO QD 11/5-11/11; 10mg PO QD 11/12-11/18; 5mg PO QD 11/19-11/25; 2.5mg PO QD 11/26-12/2; stop on 12/2/17 11/8/17   Nathanael Medina MD   sodium bicarbonate 650 MG tablet Take 2 tablets (1,300 mg total) by mouth 2 (two) times daily. 11/30/17   Geronimo Vera PA-C   thiamine 100 MG tablet Take 1 tablet (100 mg total) by mouth once daily. 11/7/17   Nathanael Medina MD   valGANciclovir (VALCYTE) 450 mg Tab Take 1 tablet (450 mg total) by mouth every Mon, Wed, Fri. Stop on 1/17/18 11/22/17   Alin López Jr., MD     Scheduled Meds:    sodium chloride 0.9%   Intravenous Once    sodium chloride 0.9%   Intravenous Once    atovaquone  1,500 mg Oral Daily    cycloSPORINE modified (NEORAL)  125 mg Oral BID    fat emulsion 20%  250 mL Intravenous Daily    [START ON 12/12/2017] heparin (porcine)  5,000 Units Subcutaneous Q8H    [START ON 12/12/2017] isavuconazonium sulfate  372 mg Oral Daily    levETIRAcetam  500 mg Oral BID    levothyroxine  75 mcg Oral Before breakfast    pantoprazole  40 mg Oral Daily    piperacillin-tazobactam 4.5 g in dextrose 5 % 100 mL IVPB (ready to mix system)  4.5 g Intravenous Q12H    polyethylene glycol  17 g Oral Daily    ursodiol  300 mg Oral BID    [START ON 12/13/2017] valGANciclovir  450 mg Oral Every Mon, Wed, Fri     Continuous Infusions:    TPN ADULT CENTRAL LINE CUSTOM 52 mL/hr at 12/10/17 2119    TPN ADULT CENTRAL LINE CUSTOM       PRN Meds:sodium chloride, sodium chloride 0.9%, sodium chloride 0.9%, acetaminophen, albumin human 25%, bisacodyl, docusate sodium, heparin (porcine), midodrine, ondansetron, ondansetron, oxyCODONE,  oxyCODONE, ramelteon, simethicone, sodium chloride 0.9%  Anticoagulants/Antiplatelets: Heparin    Allergies:   Review of patient's allergies indicates:   Allergen Reactions    Bactrim [sulfamethoxazole-trimethoprim] Other (See Comments)     Mookie Trell Syndrome     Sedation Hx: have not been any systemic reactions    Labs:    Recent Labs  Lab 12/11/17  0500   INR 1.1       Recent Labs  Lab 12/11/17  0500   WBC 3.39*   HGB 7.7*   HCT 23.1*   MCV 95   *      Recent Labs  Lab 12/05/17  0559  12/11/17  0500   GLU 79  < > 106   *  < > 132*   K 3.6  < > 2.9*   CL 97  < > 100   CO2 18*  < > 22*   BUN 49*  < > 32*   CREATININE 4.7*  < > 4.1*   CALCIUM 7.9*  < > 7.6*   MG 1.8  < > 1.5*   ALT <5*  < > 5*   AST 13  < > 14   ALBUMIN 2.0*  < > 1.7*   BILITOT 6.2*  < > 2.7*   BILIDIR 4.7*  --   --    < > = values in this interval not displayed.      Vitals:  Temp: 99.7 °F (37.6 °C) (12/11/17 0801)  Pulse: 98 (12/11/17 0918)  Resp: 18 (12/11/17 0801)  BP: (!) 104/59 (12/11/17 0801)  SpO2: 98 % (12/11/17 0918)     Physical Exam:  ASA: 2  Mallampati: n/a    General: no acute distress  Mental Status: alert and oriented to person, place and time  HEENT: normocephalic, atraumatic  Chest: unlabored breathing  Heart: regular heart rate  Abdomen: distended  Extremity: moves all extremities    Plan: ultrasound guided paracentesis  Sedation Plan: local    LANE Miles, REDP  Interventional Radiology  [phone] [employer]

## 2017-12-11 NOTE — ASSESSMENT & PLAN NOTE
- Diagnosed on previous admission.  Last para 11/29 with improving cell counts WBC 1400 with 45% PNMs   Was d/c'ed on augmentin, will switch to vanc/cef (see leukocytosis)   - Repeat Paracentesis 12/5/17, with  (improving from 1497)  - CT A/P repeated 12/9 with signif ascites  - Para 12/11 with 4.5 L removed - cell ct pending  - NPO after midnight for potential takeback to OR in AM  - Continue zosyn/vanc/isavu

## 2017-12-11 NOTE — PROGRESS NOTES
Pt returned from IR. Dressing to RLQ intact, no hematoma noted. Will resume care. RIMA Melton aware of pt's arrival, states will resume diet once cell count is resulted.

## 2017-12-11 NOTE — SUBJECTIVE & OBJECTIVE
Interval History:   Reports nausea and vomiting have resolves since biliary stent placement  Still having abdominal pain   No fevers    Review of Systems   Constitutional: Negative for chills, diaphoresis and fever.   HENT: Negative for congestion, rhinorrhea, sinus pain and sore throat.    Respiratory: Negative for cough and shortness of breath.    Cardiovascular: Negative for chest pain and leg swelling.   Gastrointestinal: Positive for abdominal pain. Negative for diarrhea, nausea and vomiting.   Musculoskeletal: Negative for arthralgias, back pain, joint swelling and myalgias.   Skin: Negative for rash and wound.   Neurological: Negative for light-headedness and headaches.   Hematological: Negative for adenopathy.     Objective:     Vital Signs (Most Recent):  Temp: 99.7 °F (37.6 °C) (12/10/17 1526)  Pulse: 95 (12/10/17 1800)  Resp: 18 (12/10/17 1526)  BP: 100/62 (12/10/17 1526)  SpO2: 98 % (12/10/17 1526) Vital Signs (24h Range):  Temp:  [98.8 °F (37.1 °C)-99.8 °F (37.7 °C)] 99.7 °F (37.6 °C)  Pulse:  [] 95  Resp:  [18] 18  SpO2:  [97 %-99 %] 98 %  BP: (100-115)/(59-68) 100/62     Weight: 79.2 kg (174 lb 9.7 oz)  Body mass index is 28.18 kg/m².    Estimated Creatinine Clearance: 32 mL/min (based on SCr of 3.4 mg/dL (H)).    Physical Exam   Constitutional: He is oriented to person, place, and time. He appears well-developed and well-nourished. No distress.   HENT:   Head: Normocephalic and atraumatic.   NG tube in place   Eyes: Conjunctivae and EOM are normal.   Neck: Normal range of motion. Neck supple.   Cardiovascular: Normal rate.    Pulmonary/Chest: Effort normal. No respiratory distress.   Abdominal: He exhibits distension. There is tenderness.   Musculoskeletal: Normal range of motion. He exhibits no edema.   Neurological: He is alert and oriented to person, place, and time.   Skin: Skin is warm and dry. No rash noted. He is not diaphoretic. No erythema.   Psychiatric: He has a normal mood and  affect. His behavior is normal.   Vitals reviewed.      Significant Labs: All pertinent labs within the past 24 hours have been reviewed.    Significant Imaging: I have reviewed all pertinent imaging results/findings within the past 24 hours.

## 2017-12-11 NOTE — CONSULTS
.Radiology Consult    Jhonny Diana is a 27 y.o. male with a history of peritonitis post transplant.  Past Medical History:   Diagnosis Date    Alcoholic hepatitis with ascites     Alcoholic hepatitis with ascites     History of hematemesis 9/28/2017    Hypertension     Renal disorder      Past Surgical History:   Procedure Laterality Date    APPENDECTOMY         Discussed with primary team.    Imaging reviewed with Radiology staff, Dr. Rose.     Procedure: abdominal drain placement.    Scheduled Meds:    sodium chloride 0.9%   Intravenous Once    atovaquone  1,500 mg Oral Daily    cycloSPORINE modified (NEORAL)  125 mg Oral BID    docusate  100 mg Oral TID    ergocalciferol  50,000 Units Oral Q7 Days    fat emulsion 20%  250 mL Intravenous Daily    heparin (porcine)  5,000 Units Subcutaneous Q8H    custom IVPB builder   Intravenous Q8H    [START ON 12/12/2017] custom IVPB builder   Intravenous Daily    levETIRAcetam in NaCl (iso-os)  500 mg Intravenous BID    levothyroxine  75 mcg Oral Before breakfast    multivitamin  1 tablet Oral Daily    pantoprazole  40 mg Oral Daily    piperacillin-tazobactam 4.5 g in dextrose 5 % 100 mL IVPB (ready to mix system)  4.5 g Intravenous Q12H    polyethylene glycol  17 g Oral Daily    potassium chloride 10%  40 mEq Oral Once    sodium bicarbonate  1,300 mg Oral TID    ursodiol  300 mg Oral BID    valganciclovir 50 mg/ml  450 mg Oral Every Mon, Wed, Fri     Continuous Infusions:    TPN ADULT CENTRAL LINE CUSTOM 52 mL/hr at 12/10/17 2119     PRN Meds:sodium chloride, sodium chloride 0.9%, acetaminophen, albumin human 25%, bisacodyl, heparin (porcine), midodrine, ondansetron, ondansetron, oxyCODONE, oxyCODONE, ramelteon, simethicone, sodium chloride 0.9%, sodium chloride 0.9%    Allergies:   Review of patient's allergies indicates:   Allergen Reactions    Bactrim [sulfamethoxazole-trimethoprim] Other (See Comments)     Mookie Trell Syndrome        Labs:    Recent Labs  Lab 12/11/17  0500   INR 1.1       Recent Labs  Lab 12/11/17  0500   WBC 3.39*   HGB 7.7*   HCT 23.1*   MCV 95   *      Recent Labs  Lab 12/05/17  0559  12/11/17  0500   GLU 79  < > 106   *  < > 132*   K 3.6  < > 2.9*   CL 97  < > 100   CO2 18*  < > 22*   BUN 49*  < > 32*   CREATININE 4.7*  < > 4.1*   CALCIUM 7.9*  < > 7.6*   MG 1.8  < > 1.5*   ALT <5*  < > 5*   AST 13  < > 14   ALBUMIN 2.0*  < > 1.7*   BILITOT 6.2*  < > 2.7*   BILIDIR 4.7*  --   --    < > = values in this interval not displayed.      Vitals (Most Recent):  Temp: 99.7 °F (37.6 °C) (12/11/17 0801)  Pulse: 100 (12/11/17 0801)  Resp: 18 (12/11/17 0801)  BP: (!) 104/59 (12/11/17 0801)  SpO2: 97 % (12/11/17 0801)    Plan:   1. NPO.  2. Hold anticoagulants.  3. Pt scheduled for abdominal drain placement for ascites and peritonitis.    Rajesh Torres MD  Radiology

## 2017-12-11 NOTE — ASSESSMENT & PLAN NOTE
- Diagnosed on previous admission.  Last para 11/29 with improving cell counts WBC 1400 with 45% PNMs   Was d/c'ed on augmentin, will switch to vanc/cef (see leukocytosis)   - Repeat Paracentesis 12/5/17, with  (improving from 1497)  - CT A/P repeated 12/9 with signif ascites  - NPO after midnight with plan for takeback or para in AM  - Continue zosyn/vanc/isavu

## 2017-12-12 PROBLEM — E80.6 HYPERBILIRUBINEMIA: Status: RESOLVED | Noted: 2017-12-12 | Resolved: 2017-12-11

## 2017-12-12 LAB
ABO + RH BLD: NORMAL
ALBUMIN SERPL BCP-MCNC: 1.7 G/DL
ALP SERPL-CCNC: 74 U/L
ALT SERPL W/O P-5'-P-CCNC: <5 U/L
ANION GAP SERPL CALC-SCNC: 12 MMOL/L
ANISOCYTOSIS BLD QL SMEAR: SLIGHT
AST SERPL-CCNC: 18 U/L
BASOPHILS # BLD AUTO: ABNORMAL K/UL
BASOPHILS NFR BLD: 0 %
BILIRUB SERPL-MCNC: 2.3 MG/DL
BLD GP AB SCN CELLS X3 SERPL QL: NORMAL
BLD PROD TYP BPU: NORMAL
BLOOD UNIT EXPIRATION DATE: NORMAL
BLOOD UNIT TYPE CODE: 5100
BLOOD UNIT TYPE: NORMAL
BUN SERPL-MCNC: 42 MG/DL
CALCIUM SERPL-MCNC: 8.2 MG/DL
CHLORIDE SERPL-SCNC: 99 MMOL/L
CO2 SERPL-SCNC: 21 MMOL/L
CODING SYSTEM: NORMAL
CREAT SERPL-MCNC: 4.9 MG/DL
CYCLOSPORINE BLD LC/MS/MS-MCNC: 214 NG/ML
DIFFERENTIAL METHOD: ABNORMAL
DISPENSE STATUS: NORMAL
EOSINOPHIL # BLD AUTO: ABNORMAL K/UL
EOSINOPHIL NFR BLD: 0 %
ERYTHROCYTE [DISTWIDTH] IN BLOOD BY AUTOMATED COUNT: 16 %
EST. GFR  (AFRICAN AMERICAN): 17.3 ML/MIN/1.73 M^2
EST. GFR  (NON AFRICAN AMERICAN): 14.9 ML/MIN/1.73 M^2
FERRITIN SERPL-MCNC: 2299 NG/ML
GLUCOSE SERPL-MCNC: 108 MG/DL
GRAM STN SPEC: NORMAL
GRAM STN SPEC: NORMAL
HCT VFR BLD AUTO: 22.4 %
HGB BLD-MCNC: 7.6 G/DL
IMM GRANULOCYTES # BLD AUTO: ABNORMAL K/UL
IMM GRANULOCYTES NFR BLD AUTO: ABNORMAL %
IRON SERPL-MCNC: 12 UG/DL
LYMPHOCYTES # BLD AUTO: ABNORMAL K/UL
LYMPHOCYTES NFR BLD: 17 %
MAGNESIUM SERPL-MCNC: 1.5 MG/DL
MCH RBC QN AUTO: 31.8 PG
MCHC RBC AUTO-ENTMCNC: 33.9 G/DL
MCV RBC AUTO: 94 FL
MONOCYTES # BLD AUTO: ABNORMAL K/UL
MONOCYTES NFR BLD: 16 %
NEUTROPHILS NFR BLD: 67 %
NRBC BLD-RTO: 0 /100 WBC
OVALOCYTES BLD QL SMEAR: ABNORMAL
PHOSPHATE SERPL-MCNC: 3.1 MG/DL
PLATELET # BLD AUTO: 126 K/UL
PLATELET BLD QL SMEAR: ABNORMAL
PMV BLD AUTO: 11.2 FL
POIKILOCYTOSIS BLD QL SMEAR: SLIGHT
POLYCHROMASIA BLD QL SMEAR: ABNORMAL
POTASSIUM SERPL-SCNC: 3.6 MMOL/L
PROT SERPL-MCNC: 4.5 G/DL
RBC # BLD AUTO: 2.39 M/UL
SATURATED IRON: 12 %
SODIUM SERPL-SCNC: 132 MMOL/L
SPECIMEN SOURCE: NORMAL
T3REVERSE SERPL-MCNC: 26.7 NG/DL (ref 9–27)
TOTAL IRON BINDING CAPACITY: 101 UG/DL
TRANS ERYTHROCYTES VOL PATIENT: NORMAL ML
TRANSFERRIN SERPL-MCNC: 68 MG/DL
TRIGL FLD-MCNC: 153 MG/DL
VANCOMYCIN SERPL-MCNC: 11 UG/ML
WBC # BLD AUTO: 3.29 K/UL

## 2017-12-12 PROCEDURE — 85007 BL SMEAR W/DIFF WBC COUNT: CPT

## 2017-12-12 PROCEDURE — 63600175 PHARM REV CODE 636 W HCPCS: Performed by: PHYSICIAN ASSISTANT

## 2017-12-12 PROCEDURE — 99233 SBSQ HOSP IP/OBS HIGH 50: CPT | Mod: ,,, | Performed by: INTERNAL MEDICINE

## 2017-12-12 PROCEDURE — 94664 DEMO&/EVAL PT USE INHALER: CPT

## 2017-12-12 PROCEDURE — 90935 HEMODIALYSIS ONE EVALUATION: CPT

## 2017-12-12 PROCEDURE — 25000003 PHARM REV CODE 250: Performed by: NURSE PRACTITIONER

## 2017-12-12 PROCEDURE — 25000003 PHARM REV CODE 250: Performed by: PHYSICIAN ASSISTANT

## 2017-12-12 PROCEDURE — 25000003 PHARM REV CODE 250: Performed by: HOSPITALIST

## 2017-12-12 PROCEDURE — P9021 RED BLOOD CELLS UNIT: HCPCS

## 2017-12-12 PROCEDURE — 27000173 HC ACAPELLA DEVICE DH OR DM

## 2017-12-12 PROCEDURE — 97803 MED NUTRITION INDIV SUBSEQ: CPT | Performed by: DIETITIAN, REGISTERED

## 2017-12-12 PROCEDURE — B4185 PARENTERAL SOL 10 GM LIPIDS: HCPCS | Performed by: PHYSICIAN ASSISTANT

## 2017-12-12 PROCEDURE — 99233 SBSQ HOSP IP/OBS HIGH 50: CPT | Mod: 24,,, | Performed by: PHYSICIAN ASSISTANT

## 2017-12-12 PROCEDURE — P9047 ALBUMIN (HUMAN), 25%, 50ML: HCPCS | Performed by: HOSPITALIST

## 2017-12-12 PROCEDURE — 86900 BLOOD TYPING SEROLOGIC ABO: CPT

## 2017-12-12 PROCEDURE — 80202 ASSAY OF VANCOMYCIN: CPT

## 2017-12-12 PROCEDURE — A4217 STERILE WATER/SALINE, 500 ML: HCPCS | Performed by: PHYSICIAN ASSISTANT

## 2017-12-12 PROCEDURE — 86920 COMPATIBILITY TEST SPIN: CPT

## 2017-12-12 PROCEDURE — 20600001 HC STEP DOWN PRIVATE ROOM

## 2017-12-12 PROCEDURE — 83735 ASSAY OF MAGNESIUM: CPT

## 2017-12-12 PROCEDURE — 63600175 PHARM REV CODE 636 W HCPCS: Performed by: HOSPITALIST

## 2017-12-12 PROCEDURE — P9047 ALBUMIN (HUMAN), 25%, 50ML: HCPCS | Performed by: PHYSICIAN ASSISTANT

## 2017-12-12 PROCEDURE — 36430 TRANSFUSION BLD/BLD COMPNT: CPT

## 2017-12-12 PROCEDURE — 80158 DRUG ASSAY CYCLOSPORINE: CPT

## 2017-12-12 PROCEDURE — 86901 BLOOD TYPING SEROLOGIC RH(D): CPT

## 2017-12-12 PROCEDURE — 82728 ASSAY OF FERRITIN: CPT

## 2017-12-12 PROCEDURE — 25000003 PHARM REV CODE 250: Performed by: STUDENT IN AN ORGANIZED HEALTH CARE EDUCATION/TRAINING PROGRAM

## 2017-12-12 PROCEDURE — 83540 ASSAY OF IRON: CPT

## 2017-12-12 PROCEDURE — 80053 COMPREHEN METABOLIC PANEL: CPT

## 2017-12-12 PROCEDURE — 85027 COMPLETE CBC AUTOMATED: CPT

## 2017-12-12 PROCEDURE — 84100 ASSAY OF PHOSPHORUS: CPT

## 2017-12-12 RX ORDER — SODIUM CHLORIDE 9 MG/ML
INJECTION, SOLUTION INTRAVENOUS ONCE
Status: COMPLETED | OUTPATIENT
Start: 2017-12-12 | End: 2017-12-12

## 2017-12-12 RX ORDER — MAGNESIUM SULFATE HEPTAHYDRATE 40 MG/ML
2 INJECTION, SOLUTION INTRAVENOUS ONCE
Status: COMPLETED | OUTPATIENT
Start: 2017-12-12 | End: 2017-12-12

## 2017-12-12 RX ORDER — HYDROCODONE BITARTRATE AND ACETAMINOPHEN 500; 5 MG/1; MG/1
TABLET ORAL
Status: DISCONTINUED | OUTPATIENT
Start: 2017-12-12 | End: 2017-12-14

## 2017-12-12 RX ORDER — OXYCODONE HYDROCHLORIDE 5 MG/1
5 TABLET ORAL
Status: DISCONTINUED | OUTPATIENT
Start: 2017-12-12 | End: 2017-12-20

## 2017-12-12 RX ORDER — SODIUM CHLORIDE 9 MG/ML
INJECTION, SOLUTION INTRAVENOUS
Status: DISCONTINUED | OUTPATIENT
Start: 2017-12-12 | End: 2017-12-14

## 2017-12-12 RX ORDER — OXYCODONE HYDROCHLORIDE 5 MG/1
10 TABLET ORAL
Status: DISCONTINUED | OUTPATIENT
Start: 2017-12-12 | End: 2017-12-20

## 2017-12-12 RX ORDER — ALBUMIN HUMAN 250 G/1000ML
25 SOLUTION INTRAVENOUS ONCE
Status: COMPLETED | OUTPATIENT
Start: 2017-12-12 | End: 2017-12-12

## 2017-12-12 RX ADMIN — HEPARIN SODIUM 5000 UNITS: 5000 INJECTION, SOLUTION INTRAVENOUS; SUBCUTANEOUS at 09:12

## 2017-12-12 RX ADMIN — OXYCODONE HYDROCHLORIDE 10 MG: 5 TABLET ORAL at 10:12

## 2017-12-12 RX ADMIN — ISAVUCONAZONIUM SULFATE 372 MG: 186 CAPSULE ORAL at 08:12

## 2017-12-12 RX ADMIN — MIDODRINE HYDROCHLORIDE 5 MG: 5 TABLET ORAL at 02:12

## 2017-12-12 RX ADMIN — OXYCODONE HYDROCHLORIDE 10 MG: 5 TABLET ORAL at 03:12

## 2017-12-12 RX ADMIN — LEVETIRACETAM 500 MG: 500 TABLET, FILM COATED ORAL at 09:12

## 2017-12-12 RX ADMIN — MAGNESIUM SULFATE IN WATER 2 G: 40 INJECTION, SOLUTION INTRAVENOUS at 07:12

## 2017-12-12 RX ADMIN — ALBUMIN (HUMAN) 25 G: 12.5 SOLUTION INTRAVENOUS at 02:12

## 2017-12-12 RX ADMIN — LEVOTHYROXINE SODIUM 75 MCG: 50 TABLET ORAL at 06:12

## 2017-12-12 RX ADMIN — HEPARIN SODIUM 5000 UNITS: 5000 INJECTION, SOLUTION INTRAVENOUS; SUBCUTANEOUS at 06:12

## 2017-12-12 RX ADMIN — CYCLOSPORINE 125 MG: 100 CAPSULE, LIQUID FILLED ORAL at 07:12

## 2017-12-12 RX ADMIN — CYCLOSPORINE 125 MG: 100 CAPSULE, LIQUID FILLED ORAL at 08:12

## 2017-12-12 RX ADMIN — SODIUM CHLORIDE: 0.9 INJECTION, SOLUTION INTRAVENOUS at 02:12

## 2017-12-12 RX ADMIN — CALCIUM GLUCONATE: 94 INJECTION, SOLUTION INTRAVENOUS at 09:12

## 2017-12-12 RX ADMIN — URSODIOL 300 MG: 300 CAPSULE ORAL at 09:12

## 2017-12-12 RX ADMIN — PIPERACILLIN SODIUM AND TAZOBACTAM SODIUM 4.5 G: 4; .5 INJECTION, POWDER, LYOPHILIZED, FOR SOLUTION INTRAVENOUS at 07:12

## 2017-12-12 RX ADMIN — OXYCODONE HYDROCHLORIDE 10 MG: 5 TABLET ORAL at 02:12

## 2017-12-12 RX ADMIN — LEVETIRACETAM 500 MG: 500 TABLET, FILM COATED ORAL at 08:12

## 2017-12-12 RX ADMIN — HEPARIN SODIUM 1000 UNITS: 1000 INJECTION, SOLUTION INTRAVENOUS; SUBCUTANEOUS at 05:12

## 2017-12-12 RX ADMIN — OXYCODONE HYDROCHLORIDE 10 MG: 5 TABLET ORAL at 07:12

## 2017-12-12 RX ADMIN — SOYBEAN OIL 250 ML: 20 INJECTION, SOLUTION INTRAVENOUS at 09:12

## 2017-12-12 RX ADMIN — URSODIOL 300 MG: 300 CAPSULE ORAL at 08:12

## 2017-12-12 RX ADMIN — ALBUMIN (HUMAN) 25 G: 12.5 SOLUTION INTRAVENOUS at 12:12

## 2017-12-12 RX ADMIN — ATOVAQUONE 1500 MG: 750 SUSPENSION ORAL at 08:12

## 2017-12-12 RX ADMIN — PIPERACILLIN SODIUM AND TAZOBACTAM SODIUM 4.5 G: 4; .5 INJECTION, POWDER, LYOPHILIZED, FOR SOLUTION INTRAVENOUS at 03:12

## 2017-12-12 RX ADMIN — PANTOPRAZOLE SODIUM 40 MG: 40 TABLET, DELAYED RELEASE ORAL at 08:12

## 2017-12-12 NOTE — PROGRESS NOTES
[facility]  Liver Transplant  Progress Note    Patient Name: Jhonny Diana  MRN: [id number]  Admission Date: 12/3/2017  Hospital Length of Stay: 9 days  Code Status: Full Code  Primary Care Provider: Primary Doctor No  Post-Operative Day: 54    ORGAN:   LIVER  Disease Etiology: Acute Alcoholic Hepatitis  Donor Type:    - Brain Death  CDC High Risk:   No  Donor CMV Status:   Donor CMV Status: Positive  Donor HBcAB:   Negative  Donor HCV Status:   Negative  Whole or Partial: Whole Liver  Biliary Anastomosis: End to End  Arterial Anatomy: Standard  Subjective:     History of Present Illness:  Mr. Diana is a 26yo man w/a history of alcoholic cirrhosis s/p DDLT 10/19/2017; c/b seizures (swtiched off prograf to cyclo), ATN requiring HD (last HD ), superificial wound infection s/p wound vac to chevron incision, and recent admit for fevers on  (discharged on empiric augmentin for suspected superficial wound infection) and readmitted  again with fever. Found to have peritonitis (WBC 5000, 75% PNM) neg for bile leak. He was treated initially with vanc/cefepime. Repeat cell counts  with some improvement (WBC 1400, 45% PNM). Pt transitioned to augmentin upon d/c .      Pt presented to the ED this morning with subjective fever (100.5) and general malaise beginning at 8pm yesterday. His abdomen is soft, but diffusely tender. Labs reveal elevated WBC count to 12.7 from 6.5 in addition to elevated tbili 3.5 from 2.3 when compared to labs 3 days ago, despite being on augmentin for recent diagnosis of peritonitis. He denies nausea, vomiting, diarrhea, congestion, cough, CP or SOB. He has received 1 L bolus NS in the ED. Infectious mosley intiated, will start BS abx (vanc/cef) and obtain CT abdomen.     He has a wound vac in place.     Hospital Course:  Interval History:      : Pt in slightly better spirits today, joking with team. Pt remains with abdominal pain which is improving as  peritonitis continues to resolve (last para ). Bili trending down at 5.2 today, cont ursodiol. PO intake remains poor, will place SHANE tube and initiate tube feeds today, orders placed. Will give albumin x1. Per nephrology will plan for HD today. H/H low on morning labs will plan for 1 unit PRBC with HD. Of note, TSH checked yesterday, level of 11.4 with T4 <3.0, endo consulted- will start levothyroxine today. Cont to encourage PO intake and ambulation. PT ordered 12/7/17, unable to see patient today - will work with pt this weekend. Monitor.     12/9:  Pt with signif nausea and abd pain this AM.  D/c TF.  Place central line and will initiate TPN.  CT A/P obtained today with signif ascites.  Plan IR vs washout in OR.  ID consulted and recommend washout.  Pt transitioned to Zosyn and started on isavu.  Will change PO meds to shane as much as able.      12/10:  Pt with signif improvement today in nausea and feels hungry.  CT A/P 12/9 with signif ascites.  Plan for IR para with drain placement in AM.  Continue Zosyn/Vanc/Isavu.  Will reorder TPN.  D/c shane in AM if not going to transition back to TF.    12/11:  D/c shane.  Continue TPN.  Nausea still improved.  Continue current abx.  Para today with 4.5 L removed.  HD cancelled.  Ascitic fluid labs pending.  Pt NPO after midnight.  Blood/urine cx 12/9 NGTD.    12/12:  Cell count negative from para yesterday.  Pt without nausea today- was able to eat a small amount.  Calorie count initiated.  Plan for IR drain placement tomorrow.  HD today with blood transfusion.    Scheduled Meds:   sodium chloride 0.9%   Intravenous Once    atovaquone  1,500 mg Oral Daily    cycloSPORINE modified (NEORAL)  125 mg Oral BID    fat emulsion 20%  250 mL Intravenous Daily    heparin (porcine)  5,000 Units Subcutaneous Q8H    isavuconazonium sulfate  372 mg Oral Daily    levETIRAcetam  500 mg Oral BID    levothyroxine  75 mcg Oral Before breakfast    pantoprazole  40 mg Oral Daily     piperacillin-tazobactam 4.5 g in dextrose 5 % 100 mL IVPB (ready to mix system)  4.5 g Intravenous Q12H    polyethylene glycol  17 g Oral Daily    ursodiol  300 mg Oral BID    [START ON 12/13/2017] valGANciclovir  450 mg Oral Every Mon, Wed, Fri     Continuous Infusions:   TPN ADULT CENTRAL LINE CUSTOM 45 mL/hr at 12/11/17 2251    TPN ADULT CENTRAL LINE CUSTOM       PRN Meds:sodium chloride, sodium chloride 0.9%, acetaminophen, albumin human 25%, bisacodyl, docusate sodium, heparin (porcine), midodrine, ondansetron, ondansetron, oxyCODONE, oxyCODONE, ramelteon, simethicone, sodium chloride 0.9%    Review of Systems   Constitutional: Positive for activity change, appetite change and fatigue. Negative for chills and fever.   Respiratory: Negative for cough, shortness of breath and wheezing.    Cardiovascular: Positive for leg swelling.   Gastrointestinal: Positive for abdominal distention, abdominal pain and constipation. Negative for nausea and vomiting.   Genitourinary: Negative for difficulty urinating and dysuria.   Skin: Positive for wound.   Allergic/Immunologic: Positive for immunocompromised state.   Neurological: Positive for weakness.   Psychiatric/Behavioral: Negative for confusion and decreased concentration. The patient is not nervous/anxious.      Objective:     Vital Signs (Most Recent):  Temp: 99.6 °F (37.6 °C) (12/12/17 1435)  Pulse: 98 (12/12/17 1530)  Resp: 18 (12/12/17 1435)  BP: 102/70 (12/12/17 1530)  SpO2: 99 % (12/12/17 1226) Vital Signs (24h Range):  Temp:  [98 °F (36.7 °C)-99.6 °F (37.6 °C)] 99.6 °F (37.6 °C)  Pulse:  [] 98  Resp:  [16-20] 18  SpO2:  [98 %-99 %] 99 %  BP: ()/(53-70) 102/70     Weight: 80.3 kg (177 lb 0.5 oz)  Body mass index is 28.57 kg/m².    Intake/Output - Last 3 Shifts       12/10 0700 - 12/11 0659 12/11 0700 - 12/12 0659 12/12 0700 - 12/13 0659    P.O. 490 600     I.V. (mL/kg)  200 (2.5)     NG/      IV Piggyback 1000 450     TPN 1151 628.7      Total Intake(mL/kg) 2761 (34.4) 1878.7 (23.4)     Urine (mL/kg/hr) 100 (0.1) 30 (0) 70 (0.1)    Other 50 (0) 4500 (2.3)     Stool 0 (0)      Total Output 150 4530 70    Net +2611 -2651.3 -70           Urine Occurrence 2 x      Stool Occurrence 3 x            Physical Exam   Constitutional: He is oriented to person, place, and time. He appears well-developed. No distress.   Temporal and distal extremity muscle wasting   HENT:   Head: Normocephalic and atraumatic.   Neck: Normal range of motion. Neck supple. No JVD present.   Cardiovascular: Regular rhythm and normal heart sounds.  Tachycardia present.    No murmur heard.  Pulmonary/Chest: Effort normal. No respiratory distress. He has decreased breath sounds in the right middle field, the right lower field and the left lower field. He has no wheezes. He exhibits no tenderness.   Abdominal: Soft. He exhibits distension. There is tenderness (diffusely ). There is no rebound and no guarding.   Wound vac to incision   Musculoskeletal: Normal range of motion. He exhibits edema (2+ LE edema). He exhibits no tenderness.   Neurological: He is alert and oriented to person, place, and time. He has normal reflexes.   Skin: Skin is warm and dry. He is not diaphoretic.   Psychiatric: His behavior is normal. Judgment and thought content normal. His mood appears anxious.   Pt anxious and expressing feeling overwhelmed   Nursing note and vitals reviewed.      Laboratory:  Immunosuppressants         Stop Route Frequency     cycloSPORINE modified capsule 125 mg      -- Oral 2 times daily        CBC:     Recent Labs  Lab 12/12/17  0606   WBC 3.29*   RBC 2.39*   HGB 7.6*   HCT 22.4*   *   MCV 94   MCH 31.8*   MCHC 33.9     CMP:     Recent Labs  Lab 12/12/17  0607      CALCIUM 8.2*   ALBUMIN 1.7*   PROT 4.5*   *   K 3.6   CO2 21*   CL 99   BUN 42*   CREATININE 4.9*   ALKPHOS 74   ALT <5*   AST 18   BILITOT 2.3*     Coagulation:     Recent Labs  Lab 12/11/17  0500    INR 1.1     Cardiac Markers: No results for input(s): CKMB, TROPONINT, MYOGLOBIN in the last 168 hours.  Labs within the past 24 hours have been reviewed.    Diagnostic Results:  I have personally reviewed all pertinent imaging studies.    Assessment/Plan:     * Fever    - See peritonitis  - Blood/urine cx repeated 12/9          Liver transplanted    - A/w fevers and hyperbilirubinemia.   - LFTs improving.  - PBS consulted. ERCP attempted on 12/4 but unable to cannulate. MRCP on 12/4 noted with stricture.  - ERCP 12/6/17  With post-anastamosis stricture, stent placed.   - Para 12/5/17, 3L removed ( down from 1497, cr 5.2, bili 2.5)          LAURA (acute kidney injury)    - Cr level remains elevated  -  Creatinine significantly elevated on 12/6 and HD started.    - Will assess daily need for HD. Appreciate Nephrology recs.   - HD last 12/8.  - Cont strict I/O's.           Long-term use of immunosuppressant medication    Cont cyclo. Monitor for toxicities and adjust dose accordingly          Prophylactic immunotherapy    - See long term immunosuppressant          Hypothyroid    -TSH 11.4  -T4 ,3.0  - Endo consulted. Start levothyroxine 75mcg PO. Instructions for administration given. Appreciate recs.           Malnutrition    - Pt malnourished with poor PO intake  - DYLAN tube placed 12/8/17 and TF started  - Not tolerating TF --> central line placed and TPN started 12/9  - Remove dylan 12/11        Biliary stricture of transplanted liver    - Elevated t bili post liver txp in setting of abdominal pain, decreased appetitive and intermittent nausea.  PBS consulted to assess duct per ERCP.  - ERCP performed 12/5 PBS unable to cannulate. MRCP with stricture.  - ERCP 12/6/17 with post-anastamosis stricture with stent placed.   - Tbili peaked at 9.1, now trending down.   -started ursodiol 12/7/17.         Peritonitis    - Diagnosed on previous admission.  Last para 11/29 with improving cell counts WBC 1400 with 45% PNMs    Was d/c'ed on augmentin, will switch to vanc/cef (see leukocytosis)   - Repeat Paracentesis 12/5/17, with  (improving from 1497)  - CT A/P repeated 12/9 with signif ascites  - Para 12/11 with 4.5 L removed - cell ct signif improved  - Plan for drain placement in IR tomorrow.    - Continue zosyn/vanc/isavu            Anemia of chronic disease    - 1 unit PRBC with HD 12/8/17  - 1 u PRBC with HD 12/12  - Discussed Fe studies with nephrology  - Monitor CBC daily.         Delayed surgical wound healing    - Wound vac in place, last changed 12/12 and healing well        Seizure    - Cont keppra              VTE Risk Mitigation         Ordered     heparin (porcine) injection 5,000 Units  Every 8 hours     Route:  Subcutaneous        12/11/17 1126     heparin (porcine) injection 1,000 Units  As needed (PRN)     Route:  Intra-Catheter        12/08/17 1736     Medium Risk of VTE  Once      12/03/17 0249     Place sequential compression device  Until discontinued      12/03/17 0249          The patients clinical status was discussed at multidisplinary rounds, involving transplant surgery, transplant medicine, pharmacy, nursing, nutrition, and social work    Discharge Planning:  Monitor kidney function --> may need outpt HD  Monitor HH needs vs rehab for deconditioned status      Geronimo Vera PA-C  Liver Transplant  [facility]

## 2017-12-12 NOTE — PROGRESS NOTES
Acute dialysis started to [facility].  Tolerated well.  Albumin and midodrine given for BP support.  One unit of PRBC to be given as ordered.

## 2017-12-12 NOTE — PROGRESS NOTES
Ordered to pull SHANE now, Per RIMA Melton. Attempted to pull SHANE, but pt states wants to wait until he eats dinner.

## 2017-12-12 NOTE — PROGRESS NOTES
Follow up for wound vac dressing change     12/12/17 1400       Incision/Site 10/30/17 0911 abdomen transverse   Date First Assessed/Time First Assessed: 10/30/17 0911   Present Prior to Hospital Arrival?: Yes  Location: abdomen  Orientation: transverse   Wound Image    Incision WDL ex   Dressing Appearance dry;intact   Appearance dry;moist;pink;granulating   Periwound Area normal skin tone   Drainage Characteristics/Odor serous   Drainage Amount small   Wound Length (cm) 45   Wound Width (cm) 4   Depth (cm) 1.1   Undermining (depth (cm)/location) 1cm from 9-12 o'clock   Wound Edges open   Cleansed W/ sterile normal saline   Irrigated W/ sterile normal saline   Therapy Setting continuous therapy;125 mmHg   Pressure Setting 125 mmHg   Negative Pressure Wound Therapy Dressing foam, black   Sponges Inserted 2   Black Sponges Inserted 2   Dressing Change Due 12/15/17     Patient seen with Geronimo Vera PA-C and identified a translucent suture present in the base of the wound.   Geronimo removed the suture from the wound base. Will continue to border wound with hydrocolloid dressing . Left wound base is up to skin level. Right side still has deeper cleft in the wound with 1cm undermining.   Zoë Murphy RN CWON  [id number]

## 2017-12-12 NOTE — SUBJECTIVE & OBJECTIVE
Scheduled Meds:   sodium chloride 0.9%   Intravenous Once    atovaquone  1,500 mg Oral Daily    cycloSPORINE modified (NEORAL)  125 mg Oral BID    fat emulsion 20%  250 mL Intravenous Daily    heparin (porcine)  5,000 Units Subcutaneous Q8H    isavuconazonium sulfate  372 mg Oral Daily    levETIRAcetam  500 mg Oral BID    levothyroxine  75 mcg Oral Before breakfast    pantoprazole  40 mg Oral Daily    piperacillin-tazobactam 4.5 g in dextrose 5 % 100 mL IVPB (ready to mix system)  4.5 g Intravenous Q12H    polyethylene glycol  17 g Oral Daily    ursodiol  300 mg Oral BID    [START ON 12/13/2017] valGANciclovir  450 mg Oral Every Mon, Wed, Fri     Continuous Infusions:   TPN ADULT CENTRAL LINE CUSTOM 45 mL/hr at 12/11/17 2251    TPN ADULT CENTRAL LINE CUSTOM       PRN Meds:sodium chloride, sodium chloride 0.9%, acetaminophen, albumin human 25%, bisacodyl, docusate sodium, heparin (porcine), midodrine, ondansetron, ondansetron, oxyCODONE, oxyCODONE, ramelteon, simethicone, sodium chloride 0.9%    Review of Systems   Constitutional: Positive for activity change, appetite change and fatigue. Negative for chills and fever.   Respiratory: Negative for cough, shortness of breath and wheezing.    Cardiovascular: Positive for leg swelling.   Gastrointestinal: Positive for abdominal distention, abdominal pain and constipation. Negative for nausea and vomiting.   Genitourinary: Negative for difficulty urinating and dysuria.   Skin: Positive for wound.   Allergic/Immunologic: Positive for immunocompromised state.   Neurological: Positive for weakness.   Psychiatric/Behavioral: Negative for confusion and decreased concentration. The patient is not nervous/anxious.      Objective:     Vital Signs (Most Recent):  Temp: 99.6 °F (37.6 °C) (12/12/17 1435)  Pulse: 98 (12/12/17 1530)  Resp: 18 (12/12/17 1435)  BP: 102/70 (12/12/17 1530)  SpO2: 99 % (12/12/17 1226) Vital Signs (24h Range):  Temp:  [98 °F (36.7 °C)-99.6 °F  (37.6 °C)] 99.6 °F (37.6 °C)  Pulse:  [] 98  Resp:  [16-20] 18  SpO2:  [98 %-99 %] 99 %  BP: ()/(53-70) 102/70     Weight: 80.3 kg (177 lb 0.5 oz)  Body mass index is 28.57 kg/m².    Intake/Output - Last 3 Shifts       12/10 0700 - 12/11 0659 12/11 0700 - 12/12 0659 12/12 0700 - 12/13 0659    P.O. 490 600     I.V. (mL/kg)  200 (2.5)     NG/      IV Piggyback 1000 450     TPN 1151 628.7     Total Intake(mL/kg) 2761 (34.4) 1878.7 (23.4)     Urine (mL/kg/hr) 100 (0.1) 30 (0) 70 (0.1)    Other 50 (0) 4500 (2.3)     Stool 0 (0)      Total Output 150 4530 70    Net +2611 -2651.3 -70           Urine Occurrence 2 x      Stool Occurrence 3 x            Physical Exam   Constitutional: He is oriented to person, place, and time. He appears well-developed. No distress.   Temporal and distal extremity muscle wasting   HENT:   Head: Normocephalic and atraumatic.   Neck: Normal range of motion. Neck supple. No JVD present.   Cardiovascular: Regular rhythm and normal heart sounds.  Tachycardia present.    No murmur heard.  Pulmonary/Chest: Effort normal. No respiratory distress. He has decreased breath sounds in the right middle field, the right lower field and the left lower field. He has no wheezes. He exhibits no tenderness.   Abdominal: Soft. He exhibits distension. There is tenderness (diffusely ). There is no rebound and no guarding.   Wound vac to incision   Musculoskeletal: Normal range of motion. He exhibits edema (2+ LE edema). He exhibits no tenderness.   Neurological: He is alert and oriented to person, place, and time. He has normal reflexes.   Skin: Skin is warm and dry. He is not diaphoretic.   Psychiatric: His behavior is normal. Judgment and thought content normal. His mood appears anxious.   Pt anxious and expressing feeling overwhelmed   Nursing note and vitals reviewed.      Laboratory:  Immunosuppressants         Stop Route Frequency     cycloSPORINE modified capsule 125 mg      -- Oral 2 times  daily        CBC:     Recent Labs  Lab 12/12/17  0606   WBC 3.29*   RBC 2.39*   HGB 7.6*   HCT 22.4*   *   MCV 94   MCH 31.8*   MCHC 33.9     CMP:     Recent Labs  Lab 12/12/17  0607      CALCIUM 8.2*   ALBUMIN 1.7*   PROT 4.5*   *   K 3.6   CO2 21*   CL 99   BUN 42*   CREATININE 4.9*   ALKPHOS 74   ALT <5*   AST 18   BILITOT 2.3*     Coagulation:     Recent Labs  Lab 12/11/17  0500   INR 1.1     Cardiac Markers: No results for input(s): CKMB, TROPONINT, MYOGLOBIN in the last 168 hours.  Labs within the past 24 hours have been reviewed.    Diagnostic Results:  I have personally reviewed all pertinent imaging studies.

## 2017-12-12 NOTE — PROGRESS NOTES
[facility]  Nephrology  Progress Note    Patient Name: Jhonny Diana  MRN: [id number]  Admission Date: 12/3/2017  Hospital Length of Stay: 8 days  Attending Provider: Solis Chapa MD   Primary Care Physician: Primary Doctor No  Principal Problem:Fever    Subjective:     HPI: Mr. Diana is a 28yo man w/a history of alcoholic cirrhosis s/p DDLT 10/19/2017; c/b seizures (swtiched off prograf to cyclo), ATN requiring HD (last HD 11/17), superificial wound infection s/p wound vac to chevron incision, and recent admit for fevers on 11/21 (discharged on empiric augmentin for suspected superficial wound infection) and readmitted 11/24 again with fever. Found to have peritonitis (WBC 5000, 75% PNM) neg for bile leak. He was treated initially with vanc/cefepime. Repeat cell counts 11/29 with some improvement (WBC 1400, 45% PNM). Pt transitioned to augmentin upon d/c 11/30.      Pt presented to the ED this morning with subjective fever (100.5) and general malaise beginning at 8pm yesterday. His abdomen is soft, but diffusely tender. Labs reveal elevated WBC count to 12.7 from 6.5 in addition to elevated tbili 3.5 from 2.3 when compared to labs 3 days ago, despite being on augmentin for recent diagnosis of peritonitis. He denies nausea, vomiting, diarrhea, congestion, cough, CP or SOB. He has received 1 L bolus NS in the ED.    Interval History: JOCELIN saw him when he was about to leave for IR.  Improved BB 2.7 Paracentesis today done but had no albumin and SBP low. HD pushback til tomorrow Plan for albumin today..  ml overnight plus 2 unmeasured voids. I/O's nor accurate    Review of patient's allergies indicates:   Allergen Reactions    Bactrim [sulfamethoxazole-trimethoprim] Other (See Comments)     Mookie Trell Syndrome     Current Facility-Administered Medications   Medication Frequency    0.9%  NaCl infusion (for blood administration) Q24H PRN    0.9%  NaCl infusion PRN    0.9%  NaCl infusion  Once    0.9%  NaCl infusion PRN    0.9%  NaCl infusion Once    acetaminophen tablet 650 mg Q8H PRN    albumin human 25% bottle 25 g PRN    atovaquone suspension 1,500 mg Daily    bisacodyl suppository 10 mg Daily PRN    cycloSPORINE modified capsule 125 mg BID    docusate sodium capsule 100 mg TID PRN    fat emulsion 20% infusion 250 mL Daily    heparin (porcine) injection 1,000 Units PRN    [START ON 12/12/2017] heparin (porcine) injection 5,000 Units Q8H    [START ON 12/12/2017] isavuconazonium sulfate Cap 372 mg Daily    levETIRAcetam tablet 500 mg BID    levothyroxine tablet 75 mcg Before breakfast    midodrine tablet 5 mg PRN    ondansetron disintegrating tablet 8 mg Q8H PRN    ondansetron injection 4 mg Q8H PRN    oxyCODONE immediate release tablet 10 mg Q4H PRN    oxyCODONE immediate release tablet 5 mg Q4H PRN    pantoprazole EC tablet 40 mg Daily    piperacillin-tazobactam 4.5 g in dextrose 5 % 100 mL IVPB (ready to mix system) Q12H    polyethylene glycol packet 17 g Daily    ramelteon tablet 8 mg Nightly PRN    simethicone chewable tablet 80 mg TID PRN    sodium chloride 0.9% flush 3 mL PRN    TPN ADULT CENTRAL LINE CUSTOM Continuous    TPN ADULT CENTRAL LINE CUSTOM Continuous    ursodiol capsule 300 mg BID    [START ON 12/13/2017] valGANciclovir tablet 450 mg Every Mon, Wed, Fri       Objective:     Vital Signs (Most Recent):  Temp: 98.3 °F (36.8 °C) (12/11/17 1645)  Pulse: 89 (12/11/17 1645)  Resp: 16 (12/11/17 1645)  BP: 104/60 (12/11/17 1645)  SpO2: 99 % (12/11/17 1645)  O2 Device (Oxygen Therapy): room air (12/11/17 1645) Vital Signs (24h Range):  Temp:  [98.3 °F (36.8 °C)-99.7 °F (37.6 °C)] 98.3 °F (36.8 °C)  Pulse:  [] 89  Resp:  [16-20] 16  SpO2:  [97 %-100 %] 99 %  BP: ()/(52-60) 104/60     Weight: 80.3 kg (177 lb 0.5 oz) (12/11/17 0400)  Body mass index is 28.57 kg/m².  Body surface area is 1.93 meters squared.    I/O last 3 completed shifts:  In: 4639.7  [P.O.:1090; I.V.:200; NG/GT:120; IV Piggyback:1450]  Out: 4680 [Urine:130; Other:4550]    Physical Exam   Constitutional: He is oriented to person, place, and time. He has a sickly appearance. No distress. Nasal cannula in place.   HENT:   Head: Normocephalic and atraumatic.   Eyes: Pupils are equal, round, and reactive to light. Scleral icterus is present.   Neck: Normal range of motion. Neck supple. No JVD present.   Cardiovascular: Regular rhythm.  Tachycardia present.  Exam reveals no gallop and no friction rub.    No murmur heard.  Pulmonary/Chest: Effort normal. He has decreased breath sounds in the right lower field and the left lower field. He has no wheezes. He has no rales.   Abdominal: Soft. He exhibits distension (positive asitis ). There is no tenderness. There is no guarding.   Wound vac to incision. Positive ascitis    Musculoskeletal: He exhibits edema (2+ pitting edema in LE ).   Neurological: He is alert and oriented to person, place, and time.   Positive Asterixis, somnolent and nauseated   Skin: Skin is warm and dry. Capillary refill takes less than 2 seconds. He is not diaphoretic.   Psychiatric: His behavior is normal. He exhibits a depressed mood.   Nursing note and vitals reviewed.      Significant Labs:  ABGs: No results for input(s): PH, PCO2, HCO3, POCSATURATED, BE in the last 168 hours.  BMP:     Recent Labs  Lab 12/11/17  0500         CO2 22*   BUN 32*   CREATININE 4.1*   CALCIUM 7.6*   MG 1.5*     CBC:     Recent Labs  Lab 12/11/17  0500   WBC 3.39*   RBC 2.44*   HGB 7.7*   HCT 23.1*   *   MCV 95   MCH 31.6*   MCHC 33.3     CMP:     Recent Labs  Lab 12/11/17  0500      CALCIUM 7.6*   ALBUMIN 1.7*   PROT 4.5*   *   K 2.9*   CO2 22*      BUN 32*   CREATININE 4.1*   ALKPHOS 90   ALT 5*   AST 14   BILITOT 2.7*     All labs within the past 24 hours have been reviewed.     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 He was admitted for peritonitis from 11/24-11/30 for peritonitis.     Plan:   - LAURA on CKD 4 (baseline sCr 3.9 - 4.0). Likely instrinsic etiology, multifactorial secondary to sepsis vs. Toxins  - Cont sodium biacarbonate 1,300 mg q TID for acidosis. Please.   - HD x 3 hrs for metabolic clearance and volume management plan for tomorrow bc low SBP post paracentesis  - Plan for albumin tonight per primary team  - Target 1 lt as tolerated keep MAP > 65  - would recommend to maintain Hg > 8 for improved chance on renal recovery.  - Avoid nephrotoxic medication, renal dose medications and avoid contrast   - Renal diet            Thank you for your consult. I will follow-up with patient. Please contact us if you have any additional questions.    Krish Frausto MD  Nephrology  [facility]    ATTENDING PHYSICIAN ATTESTATION  I have personally interviewed and examined the patient. I thoroughly reviewed the demographic, clinical, laboratorial and imaging information available in medical records. I agree with the assessment and recommendations provided by the subspecialty resident Dr. Frausto who was under my supervision

## 2017-12-12 NOTE — PLAN OF CARE
Problem: Patient Care Overview  Goal: Plan of Care Review  Outcome: Ongoing (interventions implemented as appropriate)  Pt Alex tube removed pt eating . 25% of supper was eaten.Pt free from falls. Pt wears non slip socks when ambulating. Pt bed low and locked position. Pt afebrile. Pt IV site without redness or edema.  Pt has received oxycodone PRN for pain.

## 2017-12-12 NOTE — ASSESSMENT & PLAN NOTE
- 1 unit PRBC with HD 12/8/17  - 1 u PRBC with HD 12/12  - Discussed Fe studies with nephrology  - Monitor CBC daily.

## 2017-12-12 NOTE — ASSESSMENT & PLAN NOTE
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 He was admitted for peritonitis from 11/24-11/30 for peritonitis.     Plan:   - LAURA on CKD 4 (baseline sCr 3.9 - 4.0). Likely instrinsic etiology, multifactorial secondary to sepsis vs. Toxins  - Cont sodium biacarbonate 1,300 mg q TID for acidosis. Please.   - HD x 3 hrs for metabolic clearance and volume management plan for tomorrow bc low SBP post paracentesis  - Plan for albumin tonight per primary team  - Target 1 lt as tolerated keep MAP > 65  - would recommend to maintain Hg > 8 for improved chance on renal recovery.  - Avoid nephrotoxic medication, renal dose medications and avoid contrast   - Renal diet

## 2017-12-12 NOTE — PLAN OF CARE
Problem: Liver Transplant (Adult)  Intervention: Monitor/Manage Nutrition Support  Recommendation/Intervention: Calorie count ongoing - inconclusive at this point due to pt being on/off NPO. Discussed calorie count with pt, mom, and RN. Will follow this week.     Current TPN provision providing 100% EEN and 73% EPN. Likely adequate to meet needs given ability to consume oral diet.     Continue current TPN provision and regular diet with Beneprotein TID. Pt refuses Novasource/Boost.   RD following.     Goals: Adequate nutrition PO vs EN  Nutrition Goal Status: progressing towards goal  Communication of RD Recs: discussed on rounds

## 2017-12-12 NOTE — PROGRESS NOTES
[facility]  Adult Nutrition  Progress Note    SUMMARY     Recommendations    Recommendation/Intervention: Calorie count ongoing - inconclusive at this point due to pt being on/off NPO. Discussed calorie count with pt, mom, and RN. Will follow this week.     Current TPN provision providing 100% EEN and 73% EPN. Likely adequate to meet needs given ability to consume oral diet.     Continue current TPN provision and regular diet with Beneprotein TID. Pt refuses Novasource/Boost.   RD following.     Goals: Adequate nutrition PO vs EN  Nutrition Goal Status: progressing towards goal  Communication of RD Recs: discussed on rounds      Reason for Assessment    Reason for Assessment: RD follow-up  Diagnosis: transplant/postoperative complications (peritonitis)  Relevent Medical History: OLTx 10/19/2017, complicated post-op w/ seizures and LAURA, last HD 11/17, cirrhosis 2' EtOH   Interdisciplinary Rounds: attended     General Information Comments: Alex tube removed over weekened, now on TPN + regular diet, eating minimally, continues to have complaints about food received, TPN running at goal, complaining of abdominal pain preventing oral intake, -4.5L removed via para    Nutrition Discharge Planning: Adequate PO intake    Nutrition Prescription Ordered     Current Diet Order: Regular  Nutrition Order Comments: On and off Renal/NPO  Current Nutrition Support Formula Ordered:  (Custom TPN 72g AA/356g dex)  Current Nutrition Support Rate Ordered: 45 (ml)  Current Nutrition Support Frequency Ordered: mL/hr  Oral Nutrition Supplement: Beneprotein     Evaluation of Received Nutrients/Fluid Intake    Enteral Calories (kcal): 0  Enteral Protein (gm): 0  Enteral (Free Water) Fluid (mL): 0     Parenteral Calories (kcal): 1498  Parenteral Fluid (mL): 1080       Energy Calories Required: meeting needs  % Kcal Needs: 93   Protein Required: not meeting needs  % Protein Needs: 76      GIR (Glucose Infusion Rate)  "(mg/kg/min): 3.08 mg/kg/min  Lipid Calories (kcals): 500 kcals  I/O: -4.5L since admit      Fluid Required: meeting needs (per MD)  Comments: LBM 12/10  Tolerance: not tolerating  % Intake of Estimated Energy Needs: 75 - 100 %  % Meal Intake: 0%     Nutrition Risk Screen     Nutrition Risk Screen: large or nonhealing wound, burn or pressure ulcer    Nutrition/Diet History    Patient Reported Diet/Restrictions/Preferences: renal  Typical Food/Fluid Intake: Pt eats 2 meals per day, at eats light at those times due to abdominal fullness  Food Preferences: No cultural or Yazidism food preferences noted      Factors Affecting Nutritional Intake: abdominal distention, nausea/vomiting, NPO, early satiety      Labs/Tests/Procedures/Meds    Diagnostic Test/Procedure Review: reviewed  Pertinent Labs Reviewed: reviewed  Pertinent Labs Comments: Na 132, BUN 42, gfr 14.9, Mg 1.5, TBili 2.3  Pertinent Medications Reviewed: reviewed  Pertinent Medications Comments: pantoprazole, heparin, Mg sulfate    Physical Findings    Overall Physical Appearance: generalized wasting, loss of subcutaneous fat, loss of muscle mass, edematous, weak  Tubes:  (-)  Oral/Mouth Cavity: WDL  Skin: incision, jaundice    Anthropometrics    Temp: 99.6 °F (37.6 °C)     Height: 5' 6" (167.6 cm)  Weight Method: Bed Scale  Weight: 80.3 kg (177 lb 0.5 oz)  Ideal Body Weight (IBW), Male: 142 lb     % Ideal Body Weight, Male (lb): 124.67 lb     BMI (Calculated): 28.6  BMI Grade: 25 - 29.9 - overweight     Usual Body Weight (UBW), kg:  (fluctuates 2' fluid shifts)      Estimated/Assessed Needs    Weight Used For Calorie Calculations: 81.1 kg (178 lb 12.7 oz)   Height (cm): 167.6 cm  Energy Calorie Requirements (kcal): 2155  Energy Need Method: [place]-St Jeor (x1.25 PAL)        RMR ([place]-St. Jeor Equation): 1723.75      Weight Used For Protein Calculations: 81.1 kg (178 lb 12.7 oz)  Protein Requirements: 97-114g (1.2-1.4 g/kg)    Fluid Need Method: RDA " Method      RDA Method (mL): 2158           Assessment and Plan    Malnutrition    Nutrition Diagnosis:  Inadequate energy intake    Related to (etiology):   Poor appetite 2' ascites and abdominal distention, poor food choices    Signs and Symptoms (as evidenced by):   Pt consuming ~0-25% EEN and EPN, TPN meeting 100% EEN and 73% EPN    Interventions/Recommendations (treatment strategy):  See recs    Nutrition Diagnosis Status:   Continues              Monitor and Evaluation    Food and Nutrient Intake: energy intake, food and beverage intake  Food and Nutrient Adminstration: diet order  Knowledge/Beliefs/Attitudes: food and nutrition knowledge/skill     Anthropometric Measurements: weight, weight change, body mass index  Biochemical Data, Medical Tests and Procedures: electrolyte and renal panel, gastrointestinal profile, glucose/endocrine profile, inflammatory profile, lipid profile  Nutrition-Focused Physical Findings: overall appearance    Nutrition Risk    Level of Risk:  (2x/week)    Nutrition Follow-Up    RD Follow-up?: Yes

## 2017-12-12 NOTE — ASSESSMENT & PLAN NOTE
- Pt malnourished with poor PO intake  - SHANE tube placed 12/8/17 and TF started  - Not tolerating TF --> central line placed and TPN started 12/9  - Remove shane 12/11

## 2017-12-12 NOTE — ASSESSMENT & PLAN NOTE
Nutrition Diagnosis:  Inadequate energy intake    Related to (etiology):   Poor appetite 2' ascites and abdominal distention, poor food choices    Signs and Symptoms (as evidenced by):   Pt consuming ~0-25% EEN and EPN, TPN meeting 100% EEN and 73% EPN    Interventions/Recommendations (treatment strategy):  See recs    Nutrition Diagnosis Status:   Continues

## 2017-12-12 NOTE — ASSESSMENT & PLAN NOTE
27-year-old male  - Alcoholic cirrhosis (c/b HE, EV, portal HTN, and HRS)  - s/p DDLT 10/19/2017, CMV D+/R+, steroid induction, on maintenance tacro/MMF/pred  - c/b seizures, transitioned from tacro to cyclo  - Superificial wound infection s/p wound vac to chevron   - Readmitted 11/21/2017 for suspected wound infection treated with amox-clav   - Readmitted 11/24/2017 with peritonitis (WBC 5000) discharged on amox-clav  - Biliary stricture s/p sphincterotomy, biliary stent placement in CBD 12/6/2017  - ascitic fluid not indicative of peritonitis, will continue current antibiotic coverage while cultures finalize  - will also follow up results of drain placement that is supposed to happen tomorrow.     Patient with resolution of nausea and vomiting s/p biliary stent placement. Patient is ongoing abdominal pain - repeat paracentesis with fluctuating PMN while on antibiotics (3700->82->665->312)      Recommendations:  - Continue pip-tazo for anaerobic coverage  - Continue isavuconazole for coverage of Candida and mold prophylaxis   - will follow up results of drain placement

## 2017-12-12 NOTE — ASSESSMENT & PLAN NOTE
- Diagnosed on previous admission.  Last para 11/29 with improving cell counts WBC 1400 with 45% PNMs   Was d/c'ed on augmentin, will switch to vanc/cef (see leukocytosis)   - Repeat Paracentesis 12/5/17, with  (improving from 1497)  - CT A/P repeated 12/9 with signif ascites  - Para 12/11 with 4.5 L removed - cell ct signif improved  - Plan for drain placement in IR tomorrow.    - Continue zosyn/vanc/isavu

## 2017-12-12 NOTE — PROGRESS NOTES
[facility]  Infectious Disease  Progress Note    Patient Name: Jhonny Diana  MRN: [id number]  Admission Date: 12/3/2017  Length of Stay: 9 days  Attending Physician: Solis Chapa MD  Primary Care Provider: Primary Doctor No    Isolation Status: No active isolations  Assessment/Plan:      Peritonitis     27-year-old male  - Alcoholic cirrhosis (c/b HE, EV, portal HTN, and HRS)  - s/p DDLT 10/19/2017, CMV D+/R+, steroid induction, on maintenance tacro/MMF/pred  - c/b seizures, transitioned from tacro to cyclo  - Superificial wound infection s/p wound vac to chevron   - Readmitted 11/21/2017 for suspected wound infection treated with amox-clav   - Readmitted 11/24/2017 with peritonitis (WBC 5000) discharged on amox-clav  - Biliary stricture s/p sphincterotomy, biliary stent placement in CBD 12/6/2017  - ascitic fluid not indicative of peritonitis, will continue current antibiotic coverage while cultures finalize  - will also follow up results of drain placement that is supposed to happen tomorrow.     Patient with resolution of nausea and vomiting s/p biliary stent placement. Patient is ongoing abdominal pain - repeat paracentesis with fluctuating PMN while on antibiotics (3700->82->665->312)      Recommendations:  - Continue pip-tazo for anaerobic coverage  - Continue isavuconazole for coverage of Candida and mold prophylaxis   - will follow up results of drain placement            Anticipated Disposition: As per primary team    Thank you for your consult. I will follow-up with patient. Please contact us if you have any additional questions.    Roman Jaimes MD, PhD  Infectious Disease, PGY-4  [facility]    Subjective:     Principal Problem:Fever    HPI: Case of 26 y/o male PMHx Asthma, Cirrosis 2ry to Etoh abuse, DDLT 10/19/17  Steroid induction, CMV D+/R+. Maintenance tacro/mmf/pred. Complicated with seizures, LAURA, superficial wound infection with wound vac to chevron and  peritonitis. Past admission include:     - 11/21/17 Wound infection with wound vac placement D/H on augmentin  - 11/24/17 admit for peritonitis with therapeutic and diagnostic paracentesis. D/H on Augmentin for 2 weeks        Admitted on 12/3/17 patient presented on this occasion to the ED with subjective fevers 100.5, general malaise since 2 days prior to evaluation. Had also diffuse tenderness of abdomen, nausea. Denied emesis, diarrhea, congestion, cough. On 12/6/17 ERCP was done and stent was place to correct stricture. At time had elevation of WBC of 12 from 6 and elevation of tbili with peak at 9. Both trending down but patient continues with general malaise and no improvement in symptoms since ERCP. ID consulted regarding fungal prophylaxis and antibiotic recommendations.    Interval History: Pt continues to endorse diffuse abdominal pain, but also endorses an appetite this AM.     Review of Systems   Constitutional: Negative for chills and fever.   Respiratory: Negative for chest tightness and shortness of breath.    Cardiovascular: Negative for chest pain.   Gastrointestinal: Positive for abdominal pain. Negative for nausea and vomiting.     Objective:     Vital Signs (Most Recent):  Temp: 99.6 °F (37.6 °C) (12/12/17 1435)  Pulse: 107 (12/12/17 1600)  Resp: 18 (12/12/17 1435)  BP: 121/66 (12/12/17 1600)  SpO2: 99 % (12/12/17 1226) Vital Signs (24h Range):  Temp:  [98 °F (36.7 °C)-99.6 °F (37.6 °C)] 99.6 °F (37.6 °C)  Pulse:  [] 107  Resp:  [16-20] 18  SpO2:  [98 %-99 %] 99 %  BP: ()/(53-70) 121/66     Weight: 80.3 kg (177 lb 0.5 oz)  Body mass index is 28.57 kg/m².    Estimated Creatinine Clearance: 22.3 mL/min (based on SCr of 4.9 mg/dL (H)).    Physical Exam   Constitutional: He is oriented to person, place, and time. He appears well-developed and well-nourished.   HENT:   Head: Normocephalic and atraumatic.   Mouth/Throat: Oropharynx is clear and moist.   Eyes: Pupils are equal, round, and  reactive to light. No scleral icterus.   Neck: No JVD present.   Cardiovascular: Normal rate, regular rhythm and normal heart sounds.    Pulmonary/Chest: Effort normal.   Abdominal: He exhibits distension. There is tenderness. There is no rebound.   Musculoskeletal: He exhibits edema.   Lymphadenopathy:     He has no cervical adenopathy.   Neurological: He is oriented to person, place, and time.   Skin: Skin is warm and dry. Capillary refill takes less than 2 seconds.       Significant Labs:   CBC:   Recent Labs  Lab 12/11/17  0500 12/12/17  0606   WBC 3.39* 3.29*   HGB 7.7* 7.6*   HCT 23.1* 22.4*   * 126*     CMP:   Recent Labs  Lab 12/11/17  0500 12/12/17  0607   * 132*   K 2.9* 3.6    99   CO2 22* 21*    108   BUN 32* 42*   CREATININE 4.1* 4.9*   CALCIUM 7.6* 8.2*   PROT 4.5* 4.5*   ALBUMIN 1.7* 1.7*   BILITOT 2.7* 2.3*   ALKPHOS 90 74   AST 14 18   ALT 5* <5*   ANIONGAP 10 12   EGFRNONAA 18.5* 14.9*     Microbiology Results (last 7 days)     Procedure Component Value Units Date/Time    Blood culture [[id number]] Collected:  12/09/17 0830    Order Status:  Completed Specimen:  Blood Updated:  12/12/17 1012     Blood Culture, Routine No Growth to date     Blood Culture, Routine No Growth to date     Blood Culture, Routine No Growth to date     Blood Culture, Routine No Growth to date    Blood culture [[id number]] Collected:  12/09/17 0830    Order Status:  Completed Specimen:  Blood Updated:  12/12/17 1012     Blood Culture, Routine No Growth to date     Blood Culture, Routine No Growth to date     Blood Culture, Routine No Growth to date     Blood Culture, Routine No Growth to date    Gram stain [[id number]] Collected:  12/11/17 1447    Order Status:  Completed Specimen:  Body Fluid from Ascites Updated:  12/12/17 0137     Gram Stain Result Rare WBC's      No organisms seen    Aerobic culture [[id number]] Collected:  12/11/17 1447    Order Status:  Sent Specimen:  Body Fluid from Ascites  Updated:  12/11/17 1526    Culture, Anaerobe [[id number]] Collected:  12/11/17 1447    Order Status:  Sent Specimen:  Body Fluid from Ascites Updated:  12/11/17 1526    Fungus culture [[id number]] Collected:  12/11/17 1447    Order Status:  Sent Specimen:  Body Fluid from Ascites Updated:  12/11/17 1525    Culture, Anaerobe [[id number]]     Order Status:  Completed Specimen:  Body Fluid from Ascites     Fungus culture [[id number]]     Order Status:  Completed Specimen:  Body Fluid from Ascites     Gram stain [[id number]]     Order Status:  Completed Specimen:  Body Fluid from Ascites     Aerobic culture [[id number]]     Order Status:  Completed Specimen:  Body Fluid from Ascites     Culture, Anaerobe [[id number]] Collected:  12/05/17 1657    Order Status:  Completed Specimen:  Body Fluid from Ascites Updated:  12/11/17 1152     Anaerobic Culture Culture in progress    Urine culture [[id number]] Collected:  12/09/17 1101    Order Status:  Completed Specimen:  Urine from Urine, Clean Catch Updated:  12/10/17 1444     Urine Culture, Routine No significant growth    Aerobic culture [[id number]] Collected:  12/05/17 1657    Order Status:  Completed Specimen:  Body Fluid from Ascites Updated:  12/09/17 1259     Aerobic Bacterial Culture No growth    Blood culture #2 **CANNOT BE ORDERED STAT** [[id number]] Collected:  12/03/17 0119    Order Status:  Completed Specimen:  Blood from Peripheral, Hand, Right Updated:  12/08/17 0612     Blood Culture, Routine No growth after 5 days.    Blood culture #1 **CANNOT BE ORDERED STAT** [[id number]] Collected:  12/03/17 0117    Order Status:  Completed Specimen:  Blood from Peripheral, Hand, Left Updated:  12/08/17 0612     Blood Culture, Routine No growth after 5 days.    Gram stain [[id number]] Collected:  12/05/17 1657    Order Status:  Completed Specimen:  Body Fluid from Ascites Updated:  12/05/17 2304     Gram Stain Result Rare WBC's      No organisms seen          Significant Imaging: I  have reviewed all pertinent imaging results/findings within the past 24 hours.

## 2017-12-12 NOTE — SUBJECTIVE & OBJECTIVE
Interval History: Pt continues to endorse diffuse abdominal pain, but also endorses an appetite this AM.     Review of Systems   Constitutional: Negative for chills and fever.   Respiratory: Negative for chest tightness and shortness of breath.    Cardiovascular: Negative for chest pain.   Gastrointestinal: Positive for abdominal pain. Negative for nausea and vomiting.     Objective:     Vital Signs (Most Recent):  Temp: 99.6 °F (37.6 °C) (12/12/17 1435)  Pulse: 107 (12/12/17 1600)  Resp: 18 (12/12/17 1435)  BP: 121/66 (12/12/17 1600)  SpO2: 99 % (12/12/17 1226) Vital Signs (24h Range):  Temp:  [98 °F (36.7 °C)-99.6 °F (37.6 °C)] 99.6 °F (37.6 °C)  Pulse:  [] 107  Resp:  [16-20] 18  SpO2:  [98 %-99 %] 99 %  BP: ()/(53-70) 121/66     Weight: 80.3 kg (177 lb 0.5 oz)  Body mass index is 28.57 kg/m².    Estimated Creatinine Clearance: 22.3 mL/min (based on SCr of 4.9 mg/dL (H)).    Physical Exam   Constitutional: He is oriented to person, place, and time. He appears well-developed and well-nourished.   HENT:   Head: Normocephalic and atraumatic.   Mouth/Throat: Oropharynx is clear and moist.   Eyes: Pupils are equal, round, and reactive to light. No scleral icterus.   Neck: No JVD present.   Cardiovascular: Normal rate, regular rhythm and normal heart sounds.    Pulmonary/Chest: Effort normal.   Abdominal: He exhibits distension. There is tenderness. There is no rebound.   Musculoskeletal: He exhibits edema.   Lymphadenopathy:     He has no cervical adenopathy.   Neurological: He is oriented to person, place, and time.   Skin: Skin is warm and dry. Capillary refill takes less than 2 seconds.       Significant Labs:   CBC:   Recent Labs  Lab 12/11/17  0500 12/12/17  0606   WBC 3.39* 3.29*   HGB 7.7* 7.6*   HCT 23.1* 22.4*   * 126*     CMP:   Recent Labs  Lab 12/11/17  0500 12/12/17  0607   * 132*   K 2.9* 3.6    99   CO2 22* 21*    108   BUN 32* 42*   CREATININE 4.1* 4.9*   CALCIUM  7.6* 8.2*   PROT 4.5* 4.5*   ALBUMIN 1.7* 1.7*   BILITOT 2.7* 2.3*   ALKPHOS 90 74   AST 14 18   ALT 5* <5*   ANIONGAP 10 12   EGFRNONAA 18.5* 14.9*     Microbiology Results (last 7 days)     Procedure Component Value Units Date/Time    Blood culture [[id number]] Collected:  12/09/17 0830    Order Status:  Completed Specimen:  Blood Updated:  12/12/17 1012     Blood Culture, Routine No Growth to date     Blood Culture, Routine No Growth to date     Blood Culture, Routine No Growth to date     Blood Culture, Routine No Growth to date    Blood culture [[id number]] Collected:  12/09/17 0830    Order Status:  Completed Specimen:  Blood Updated:  12/12/17 1012     Blood Culture, Routine No Growth to date     Blood Culture, Routine No Growth to date     Blood Culture, Routine No Growth to date     Blood Culture, Routine No Growth to date    Gram stain [[id number]] Collected:  12/11/17 1447    Order Status:  Completed Specimen:  Body Fluid from Ascites Updated:  12/12/17 0137     Gram Stain Result Rare WBC's      No organisms seen    Aerobic culture [[id number]] Collected:  12/11/17 1447    Order Status:  Sent Specimen:  Body Fluid from Ascites Updated:  12/11/17 1526    Culture, Anaerobe [[id number]] Collected:  12/11/17 1447    Order Status:  Sent Specimen:  Body Fluid from Ascites Updated:  12/11/17 1526    Fungus culture [[id number]] Collected:  12/11/17 1447    Order Status:  Sent Specimen:  Body Fluid from Ascites Updated:  12/11/17 1525    Culture, Anaerobe [[id number]]     Order Status:  Completed Specimen:  Body Fluid from Ascites     Fungus culture [[id number]]     Order Status:  Completed Specimen:  Body Fluid from Ascites     Gram stain [[id number]]     Order Status:  Completed Specimen:  Body Fluid from Ascites     Aerobic culture [[id number]]     Order Status:  Completed Specimen:  Body Fluid from Ascites     Culture, Anaerobe [[id number]] Collected:  12/05/17 1657    Order Status:  Completed Specimen:  Body Fluid from  Ascites Updated:  12/11/17 1152     Anaerobic Culture Culture in progress    Urine culture [[id number]] Collected:  12/09/17 1101    Order Status:  Completed Specimen:  Urine from Urine, Clean Catch Updated:  12/10/17 1444     Urine Culture, Routine No significant growth    Aerobic culture [[id number]] Collected:  12/05/17 1657    Order Status:  Completed Specimen:  Body Fluid from Ascites Updated:  12/09/17 1259     Aerobic Bacterial Culture No growth    Blood culture #2 **CANNOT BE ORDERED STAT** [[id number]] Collected:  12/03/17 0119    Order Status:  Completed Specimen:  Blood from Peripheral, Hand, Right Updated:  12/08/17 0612     Blood Culture, Routine No growth after 5 days.    Blood culture #1 **CANNOT BE ORDERED STAT** [[id number]] Collected:  12/03/17 0117    Order Status:  Completed Specimen:  Blood from Peripheral, Hand, Left Updated:  12/08/17 0612     Blood Culture, Routine No growth after 5 days.    Gram stain [[id number]] Collected:  12/05/17 1657    Order Status:  Completed Specimen:  Body Fluid from Ascites Updated:  12/05/17 2304     Gram Stain Result Rare WBC's      No organisms seen          Significant Imaging: I have reviewed all pertinent imaging results/findings within the past 24 hours.

## 2017-12-12 NOTE — PLAN OF CARE
Problem: Patient Care Overview  Goal: Plan of Care Review  -t free from fall or injury so far this shift. Instructed to call if assistance needed, verbalized understanding.   -Cardiac monitoring in progress, currently SR.  -TPN and lipids continued. TPN rate to be increased to 50 cc/hr tonight.   -No complaints of nausea. Pt was able to eat small amount of food from cafeteria (taco salad). Calorie count in progress.   -Mg 1.5 today, IV replacement given.   -Creat 4.9, getting HD now. H&H 7.6/22.4, 1 unit PRBC's given with HD.   -1 x dose of Albumin given today.   -Wound vac changed today, per Zoë wound care RN.   -Pain mildly controlled with PRN Oxycodone. 1053 dose given early today, per pt's request, and ok's per RIMA Melton.   -No skin breakdown noted. Pt able to adjust position independently.   -Afebrile. Zosyn continued. Daily labs monitored.   -Pt NPO after MN for IR abscess drainage & tube placement.

## 2017-12-12 NOTE — SUBJECTIVE & OBJECTIVE
Interval History: JOCELIN saw him when he was about to leave for IR.  Improved BB 2.7 Paracentesis today done but had no albumin and SBP low. HD pushback til tomorrow Plan for albumin today..  ml overnight plus 2 unmeasured voids. I/O's nor accurate    Review of patient's allergies indicates:   Allergen Reactions    Bactrim [sulfamethoxazole-trimethoprim] Other (See Comments)     Mookie Trell Syndrome     Current Facility-Administered Medications   Medication Frequency    0.9%  NaCl infusion (for blood administration) Q24H PRN    0.9%  NaCl infusion PRN    0.9%  NaCl infusion Once    0.9%  NaCl infusion PRN    0.9%  NaCl infusion Once    acetaminophen tablet 650 mg Q8H PRN    albumin human 25% bottle 25 g PRN    atovaquone suspension 1,500 mg Daily    bisacodyl suppository 10 mg Daily PRN    cycloSPORINE modified capsule 125 mg BID    docusate sodium capsule 100 mg TID PRN    fat emulsion 20% infusion 250 mL Daily    heparin (porcine) injection 1,000 Units PRN    [START ON 12/12/2017] heparin (porcine) injection 5,000 Units Q8H    [START ON 12/12/2017] isavuconazonium sulfate Cap 372 mg Daily    levETIRAcetam tablet 500 mg BID    levothyroxine tablet 75 mcg Before breakfast    midodrine tablet 5 mg PRN    ondansetron disintegrating tablet 8 mg Q8H PRN    ondansetron injection 4 mg Q8H PRN    oxyCODONE immediate release tablet 10 mg Q4H PRN    oxyCODONE immediate release tablet 5 mg Q4H PRN    pantoprazole EC tablet 40 mg Daily    piperacillin-tazobactam 4.5 g in dextrose 5 % 100 mL IVPB (ready to mix system) Q12H    polyethylene glycol packet 17 g Daily    ramelteon tablet 8 mg Nightly PRN    simethicone chewable tablet 80 mg TID PRN    sodium chloride 0.9% flush 3 mL PRN    TPN ADULT CENTRAL LINE CUSTOM Continuous    TPN ADULT CENTRAL LINE CUSTOM Continuous    ursodiol capsule 300 mg BID    [START ON 12/13/2017] valGANciclovir tablet 450 mg Every Mon, Wed, Fri       Objective:      Vital Signs (Most Recent):  Temp: 98.3 °F (36.8 °C) (12/11/17 1645)  Pulse: 89 (12/11/17 1645)  Resp: 16 (12/11/17 1645)  BP: 104/60 (12/11/17 1645)  SpO2: 99 % (12/11/17 1645)  O2 Device (Oxygen Therapy): room air (12/11/17 1645) Vital Signs (24h Range):  Temp:  [98.3 °F (36.8 °C)-99.7 °F (37.6 °C)] 98.3 °F (36.8 °C)  Pulse:  [] 89  Resp:  [16-20] 16  SpO2:  [97 %-100 %] 99 %  BP: ()/(52-60) 104/60     Weight: 80.3 kg (177 lb 0.5 oz) (12/11/17 0400)  Body mass index is 28.57 kg/m².  Body surface area is 1.93 meters squared.    I/O last 3 completed shifts:  In: 4639.7 [P.O.:1090; I.V.:200; NG/GT:120; IV Piggyback:1450]  Out: 4680 [Urine:130; Other:4550]    Physical Exam   Constitutional: He is oriented to person, place, and time. He has a sickly appearance. No distress. Nasal cannula in place.   HENT:   Head: Normocephalic and atraumatic.   Eyes: Pupils are equal, round, and reactive to light. Scleral icterus is present.   Neck: Normal range of motion. Neck supple. No JVD present.   Cardiovascular: Regular rhythm.  Tachycardia present.  Exam reveals no gallop and no friction rub.    No murmur heard.  Pulmonary/Chest: Effort normal. He has decreased breath sounds in the right lower field and the left lower field. He has no wheezes. He has no rales.   Abdominal: Soft. He exhibits distension (positive asitis ). There is no tenderness. There is no guarding.   Wound vac to incision. Positive ascitis    Musculoskeletal: He exhibits edema (2+ pitting edema in LE ).   Neurological: He is alert and oriented to person, place, and time.   Positive Asterixis, somnolent and nauseated   Skin: Skin is warm and dry. Capillary refill takes less than 2 seconds. He is not diaphoretic.   Psychiatric: His behavior is normal. He exhibits a depressed mood.   Nursing note and vitals reviewed.      Significant Labs:  ABGs: No results for input(s): PH, PCO2, HCO3, POCSATURATED, BE in the last 168 hours.  BMP:     Recent  Labs  Lab 12/11/17  0500         CO2 22*   BUN 32*   CREATININE 4.1*   CALCIUM 7.6*   MG 1.5*     CBC:     Recent Labs  Lab 12/11/17  0500   WBC 3.39*   RBC 2.44*   HGB 7.7*   HCT 23.1*   *   MCV 95   MCH 31.6*   MCHC 33.3     CMP:     Recent Labs  Lab 12/11/17  0500      CALCIUM 7.6*   ALBUMIN 1.7*   PROT 4.5*   *   K 2.9*   CO2 22*      BUN 32*   CREATININE 4.1*   ALKPHOS 90   ALT 5*   AST 14   BILITOT 2.7*     All labs within the past 24 hours have been reviewed.     Significant Imaging:  Labs: Reviewed  X-Ray: Reviewed

## 2017-12-12 NOTE — ASSESSMENT & PLAN NOTE
- Cr level remains elevated  -  Creatinine significantly elevated on 12/6 and HD started.    - Will assess daily need for HD. Appreciate Nephrology recs.   - HD last 12/8.  - Cont strict I/O's.

## 2017-12-13 PROBLEM — F32.A DEPRESSION: Status: ACTIVE | Noted: 2017-12-13

## 2017-12-13 PROBLEM — E43 SEVERE MALNUTRITION: Status: ACTIVE | Noted: 2017-12-05

## 2017-12-13 LAB
ALBUMIN SERPL BCP-MCNC: 1.9 G/DL
ALP SERPL-CCNC: 93 U/L
ALT SERPL W/O P-5'-P-CCNC: <5 U/L
ANION GAP SERPL CALC-SCNC: 11 MMOL/L
ANISOCYTOSIS BLD QL SMEAR: SLIGHT
APPEARANCE FLD: NORMAL
AST SERPL-CCNC: 17 U/L
BACTERIA SPEC ANAEROBE CULT: NORMAL
BASOPHILS # BLD AUTO: ABNORMAL K/UL
BASOPHILS NFR BLD: 1 %
BILIRUB SERPL-MCNC: 2.4 MG/DL
BODY FLD TYPE: NORMAL
BUN SERPL-MCNC: 24 MG/DL
CALCIUM SERPL-MCNC: 8.6 MG/DL
CHLORIDE SERPL-SCNC: 99 MMOL/L
CO2 SERPL-SCNC: 25 MMOL/L
COLOR FLD: NORMAL
CREAT SERPL-MCNC: 3.4 MG/DL
CYCLOSPORINE BLD LC/MS/MS-MCNC: 195 NG/ML
DIFFERENTIAL METHOD: ABNORMAL
EOSINOPHIL # BLD AUTO: ABNORMAL K/UL
EOSINOPHIL NFR BLD: 0 %
ERYTHROCYTE [DISTWIDTH] IN BLOOD BY AUTOMATED COUNT: 16.1 %
EST. GFR  (AFRICAN AMERICAN): 26.8 ML/MIN/1.73 M^2
EST. GFR  (NON AFRICAN AMERICAN): 23.2 ML/MIN/1.73 M^2
GLUCOSE SERPL-MCNC: 102 MG/DL
GRAM STN SPEC: NORMAL
GRAM STN SPEC: NORMAL
HCT VFR BLD AUTO: 24.4 %
HGB BLD-MCNC: 8.3 G/DL
IMM GRANULOCYTES # BLD AUTO: ABNORMAL K/UL
IMM GRANULOCYTES NFR BLD AUTO: ABNORMAL %
LYMPHOCYTES # BLD AUTO: ABNORMAL K/UL
LYMPHOCYTES NFR BLD: 19 %
LYMPHOCYTES NFR FLD MANUAL: 30 %
MAGNESIUM SERPL-MCNC: 2.1 MG/DL
MCH RBC QN AUTO: 31.4 PG
MCHC RBC AUTO-ENTMCNC: 34 G/DL
MCV RBC AUTO: 92 FL
MONOCYTES # BLD AUTO: ABNORMAL K/UL
MONOCYTES NFR BLD: 18 %
MONOS+MACROS NFR FLD MANUAL: 45 %
NEUTROPHILS NFR BLD: 62 %
NEUTROPHILS NFR FLD MANUAL: 25 %
NRBC BLD-RTO: 0 /100 WBC
OVALOCYTES BLD QL SMEAR: ABNORMAL
PHOSPHATE SERPL-MCNC: 2.5 MG/DL
PLATELET # BLD AUTO: 144 K/UL
PLATELET BLD QL SMEAR: ABNORMAL
PMV BLD AUTO: 10.8 FL
POIKILOCYTOSIS BLD QL SMEAR: SLIGHT
POTASSIUM SERPL-SCNC: 4 MMOL/L
PROT SERPL-MCNC: 4.8 G/DL
RBC # BLD AUTO: 2.64 M/UL
SODIUM SERPL-SCNC: 135 MMOL/L
SPECIMEN SOURCE: NORMAL
TRIGL FLD-MCNC: 143 MG/DL
WBC # BLD AUTO: 3.59 K/UL
WBC # FLD: 173 /CU MM

## 2017-12-13 PROCEDURE — 20600001 HC STEP DOWN PRIVATE ROOM

## 2017-12-13 PROCEDURE — 80158 DRUG ASSAY CYCLOSPORINE: CPT

## 2017-12-13 PROCEDURE — 80053 COMPREHEN METABOLIC PANEL: CPT

## 2017-12-13 PROCEDURE — 63600175 PHARM REV CODE 636 W HCPCS: Performed by: PHYSICIAN ASSISTANT

## 2017-12-13 PROCEDURE — 63600175 PHARM REV CODE 636 W HCPCS: Performed by: RADIOLOGY

## 2017-12-13 PROCEDURE — 25000003 PHARM REV CODE 250: Performed by: PHYSICIAN ASSISTANT

## 2017-12-13 PROCEDURE — 99232 SBSQ HOSP IP/OBS MODERATE 35: CPT | Mod: ,,, | Performed by: INTERNAL MEDICINE

## 2017-12-13 PROCEDURE — 85027 COMPLETE CBC AUTOMATED: CPT

## 2017-12-13 PROCEDURE — 63600175 PHARM REV CODE 636 W HCPCS: Performed by: TRANSPLANT SURGERY

## 2017-12-13 PROCEDURE — A4217 STERILE WATER/SALINE, 500 ML: HCPCS | Performed by: TRANSPLANT SURGERY

## 2017-12-13 PROCEDURE — 84100 ASSAY OF PHOSPHORUS: CPT

## 2017-12-13 PROCEDURE — 25000003 PHARM REV CODE 250: Performed by: NURSE PRACTITIONER

## 2017-12-13 PROCEDURE — 87070 CULTURE OTHR SPECIMN AEROBIC: CPT

## 2017-12-13 PROCEDURE — 89051 BODY FLUID CELL COUNT: CPT

## 2017-12-13 PROCEDURE — 87205 SMEAR GRAM STAIN: CPT

## 2017-12-13 PROCEDURE — 85007 BL SMEAR W/DIFF WBC COUNT: CPT

## 2017-12-13 PROCEDURE — 25000003 PHARM REV CODE 250: Performed by: TRANSPLANT SURGERY

## 2017-12-13 PROCEDURE — 99233 SBSQ HOSP IP/OBS HIGH 50: CPT | Mod: ,,, | Performed by: INTERNAL MEDICINE

## 2017-12-13 PROCEDURE — P9047 ALBUMIN (HUMAN), 25%, 50ML: HCPCS | Performed by: PHYSICIAN ASSISTANT

## 2017-12-13 PROCEDURE — 99233 SBSQ HOSP IP/OBS HIGH 50: CPT | Mod: 24,,, | Performed by: PHYSICIAN ASSISTANT

## 2017-12-13 PROCEDURE — B4185 PARENTERAL SOL 10 GM LIPIDS: HCPCS | Performed by: PHYSICIAN ASSISTANT

## 2017-12-13 PROCEDURE — 25000003 PHARM REV CODE 250: Performed by: STUDENT IN AN ORGANIZED HEALTH CARE EDUCATION/TRAINING PROGRAM

## 2017-12-13 PROCEDURE — 0W9G30Z DRAINAGE OF PERITONEAL CAVITY WITH DRAINAGE DEVICE, PERCUTANEOUS APPROACH: ICD-10-PCS | Performed by: RADIOLOGY

## 2017-12-13 PROCEDURE — 83735 ASSAY OF MAGNESIUM: CPT

## 2017-12-13 RX ORDER — MIDAZOLAM HYDROCHLORIDE 1 MG/ML
INJECTION INTRAMUSCULAR; INTRAVENOUS CODE/TRAUMA/SEDATION MEDICATION
Status: COMPLETED | OUTPATIENT
Start: 2017-12-13 | End: 2017-12-13

## 2017-12-13 RX ORDER — DOCUSATE SODIUM 100 MG/1
100 CAPSULE, LIQUID FILLED ORAL
Status: DISCONTINUED | OUTPATIENT
Start: 2017-12-13 | End: 2017-12-20 | Stop reason: HOSPADM

## 2017-12-13 RX ORDER — ALBUMIN HUMAN 250 G/1000ML
25 SOLUTION INTRAVENOUS ONCE
Status: COMPLETED | OUTPATIENT
Start: 2017-12-13 | End: 2017-12-13

## 2017-12-13 RX ORDER — BISACODYL 10 MG
10 SUPPOSITORY, RECTAL RECTAL ONCE
Status: DISCONTINUED | OUTPATIENT
Start: 2017-12-13 | End: 2017-12-20 | Stop reason: HOSPADM

## 2017-12-13 RX ORDER — FENTANYL CITRATE 50 UG/ML
INJECTION, SOLUTION INTRAMUSCULAR; INTRAVENOUS CODE/TRAUMA/SEDATION MEDICATION
Status: COMPLETED | OUTPATIENT
Start: 2017-12-13 | End: 2017-12-13

## 2017-12-13 RX ADMIN — OXYCODONE HYDROCHLORIDE 10 MG: 5 TABLET ORAL at 07:12

## 2017-12-13 RX ADMIN — OXYCODONE HYDROCHLORIDE 10 MG: 5 TABLET ORAL at 01:12

## 2017-12-13 RX ADMIN — CYCLOSPORINE 125 MG: 100 CAPSULE, LIQUID FILLED ORAL at 07:12

## 2017-12-13 RX ADMIN — LEVETIRACETAM 500 MG: 500 TABLET, FILM COATED ORAL at 10:12

## 2017-12-13 RX ADMIN — DOCUSATE SODIUM 100 MG: 100 CAPSULE, LIQUID FILLED ORAL at 03:12

## 2017-12-13 RX ADMIN — PIPERACILLIN SODIUM AND TAZOBACTAM SODIUM 4.5 G: 4; .5 INJECTION, POWDER, LYOPHILIZED, FOR SOLUTION INTRAVENOUS at 11:12

## 2017-12-13 RX ADMIN — URSODIOL 300 MG: 300 CAPSULE ORAL at 07:12

## 2017-12-13 RX ADMIN — ALBUMIN (HUMAN) 25 G: 12.5 SOLUTION INTRAVENOUS at 07:12

## 2017-12-13 RX ADMIN — URSODIOL 300 MG: 300 CAPSULE ORAL at 10:12

## 2017-12-13 RX ADMIN — OXYCODONE HYDROCHLORIDE 10 MG: 5 TABLET ORAL at 04:12

## 2017-12-13 RX ADMIN — LEVOTHYROXINE SODIUM 75 MCG: 50 TABLET ORAL at 05:12

## 2017-12-13 RX ADMIN — POLYETHYLENE GLYCOL 3350 17 G: 17 POWDER, FOR SOLUTION ORAL at 10:12

## 2017-12-13 RX ADMIN — CYCLOSPORINE 125 MG: 100 CAPSULE, LIQUID FILLED ORAL at 06:12

## 2017-12-13 RX ADMIN — SOYBEAN OIL 250 ML: 20 INJECTION, SOLUTION INTRAVENOUS at 09:12

## 2017-12-13 RX ADMIN — HEPARIN SODIUM 5000 UNITS: 5000 INJECTION, SOLUTION INTRAVENOUS; SUBCUTANEOUS at 01:12

## 2017-12-13 RX ADMIN — VALGANCICLOVIR 450 MG: 450 TABLET, FILM COATED ORAL at 04:12

## 2017-12-13 RX ADMIN — HEPARIN SODIUM 5000 UNITS: 5000 INJECTION, SOLUTION INTRAVENOUS; SUBCUTANEOUS at 09:12

## 2017-12-13 RX ADMIN — CALCIUM GLUCONATE: 94 INJECTION, SOLUTION INTRAVENOUS at 09:12

## 2017-12-13 RX ADMIN — FENTANYL CITRATE 25 MCG: 50 INJECTION, SOLUTION INTRAMUSCULAR; INTRAVENOUS at 08:12

## 2017-12-13 RX ADMIN — ALBUMIN (HUMAN) 25 G: 12.5 SOLUTION INTRAVENOUS at 10:12

## 2017-12-13 RX ADMIN — ISAVUCONAZONIUM SULFATE 372 MG: 186 CAPSULE ORAL at 10:12

## 2017-12-13 RX ADMIN — MIDAZOLAM HYDROCHLORIDE 0.5 MG: 1 INJECTION, SOLUTION INTRAMUSCULAR; INTRAVENOUS at 08:12

## 2017-12-13 RX ADMIN — Medication 500 MG: at 03:12

## 2017-12-13 RX ADMIN — ATOVAQUONE 1500 MG: 750 SUSPENSION ORAL at 10:12

## 2017-12-13 RX ADMIN — LEVETIRACETAM 500 MG: 500 TABLET, FILM COATED ORAL at 07:12

## 2017-12-13 RX ADMIN — OXYCODONE HYDROCHLORIDE 10 MG: 5 TABLET ORAL at 10:12

## 2017-12-13 RX ADMIN — HEPARIN SODIUM 5000 UNITS: 5000 INJECTION, SOLUTION INTRAVENOUS; SUBCUTANEOUS at 05:12

## 2017-12-13 RX ADMIN — PANTOPRAZOLE SODIUM 40 MG: 40 TABLET, DELAYED RELEASE ORAL at 10:12

## 2017-12-13 NOTE — PROGRESS NOTES
Spoke with RIMA Leger re: drain output - 2,495cc output since drain placed this am. Albumin already administered x2. Will continue to monitor and notify PA if output >1L for additional albumin replacement.

## 2017-12-13 NOTE — SUBJECTIVE & OBJECTIVE
Scheduled Meds:   atovaquone  1,500 mg Oral Daily    bisacodyl  10 mg Rectal Once    cycloSPORINE modified (NEORAL)  125 mg Oral BID    docusate sodium  100 mg Oral TID PC    fat emulsion 20%  250 mL Intravenous Daily    heparin (porcine)  5,000 Units Subcutaneous Q8H    isavuconazonium sulfate  372 mg Oral Daily    levETIRAcetam  500 mg Oral BID    levothyroxine  75 mcg Oral Before breakfast    pantoprazole  40 mg Oral Daily    piperacillin-tazobactam 4.5 g in dextrose 5 % 100 mL IVPB (ready to mix system)  4.5 g Intravenous Q12H    polyethylene glycol  17 g Oral Daily    ursodiol  300 mg Oral BID    valGANciclovir  450 mg Oral Every Mon, Wed, Fri    vancomycin (VANCOCIN) IVPB  500 mg Intravenous Once     Continuous Infusions:   TPN ADULT CENTRAL LINE CUSTOM 50 mL/hr at 12/12/17 2132    TPN ADULT CENTRAL LINE CUSTOM       PRN Meds:sodium chloride, sodium chloride 0.9%, acetaminophen, albumin human 25%, bisacodyl, heparin (porcine), midodrine, ondansetron, ondansetron, oxyCODONE, oxyCODONE, ramelteon, simethicone, sodium chloride 0.9%    Review of Systems   Constitutional: Positive for activity change, appetite change and fatigue. Negative for chills and fever.   Respiratory: Negative for cough, shortness of breath and wheezing.    Cardiovascular: Positive for leg swelling.   Gastrointestinal: Positive for abdominal distention, abdominal pain and constipation. Negative for nausea and vomiting.   Genitourinary: Negative for difficulty urinating and dysuria.   Skin: Positive for wound.   Allergic/Immunologic: Positive for immunocompromised state.   Neurological: Positive for weakness.   Psychiatric/Behavioral: Negative for confusion and decreased concentration. The patient is not nervous/anxious.      Objective:     Vital Signs (Most Recent):  Temp: 99.9 °F (37.7 °C) (12/13/17 1213)  Pulse: 105 (12/13/17 1213)  Resp: 16 (12/13/17 1213)  BP: (!) 103/56 (12/13/17 1213)  SpO2: 95 % (12/13/17 1213) Vital  Signs (24h Range):  Temp:  [99.1 °F (37.3 °C)-100.1 °F (37.8 °C)] 99.9 °F (37.7 °C)  Pulse:  [] 105  Resp:  [15-20] 16  SpO2:  [95 %-100 %] 95 %  BP: ()/(53-71) 103/56     Weight: 74.8 kg (164 lb 14.5 oz)  Body mass index is 26.62 kg/m².    Intake/Output - Last 3 Shifts       12/11 0700 - 12/12 0659 12/12 0700 - 12/13 0659 12/13 0700 - 12/14 0659    P.O. 600 240     I.V. (mL/kg) 200 (2.5)      Other  879     NG/GT       IV Piggyback 450      .7 1855.9     Total Intake(mL/kg) 1878.7 (23.4) 2974.9 (39.8)     Urine (mL/kg/hr) 30 (0) 70 (0) 40 (0.1)    Other 4500 (2.3) 2900 (1.6) 2495 (4.3)    Stool   0 (0)    Total Output 4530 2970 2535    Net -2651.3 +4.9 -2535           Urine Occurrence  0 x     Stool Occurrence  1 x 1 x          Physical Exam   Constitutional: He is oriented to person, place, and time. He appears well-developed. No distress.   Temporal and distal extremity muscle wasting   HENT:   Head: Normocephalic and atraumatic.   Eyes: No scleral icterus.   Neck: Normal range of motion. Neck supple. No JVD present.   Cardiovascular: Regular rhythm and normal heart sounds.  Tachycardia present.    No murmur heard.  Pulmonary/Chest: Effort normal. No respiratory distress. He has decreased breath sounds in the right middle field, the right lower field and the left lower field. He has no wheezes. He exhibits no tenderness.   Abdominal: Soft. He exhibits distension. There is tenderness (diffusely ). There is no rebound and no guarding.   Wound vac to incision   Musculoskeletal: Normal range of motion. He exhibits edema (2+ LE edema). He exhibits no tenderness.   Neurological: He is alert and oriented to person, place, and time. He has normal reflexes.   Skin: Skin is warm and dry. He is not diaphoretic.   Psychiatric: His behavior is normal. Judgment and thought content normal. His mood appears anxious.   Pt anxious and expressing feeling overwhelmed   Nursing note and vitals  reviewed.      Laboratory:  Immunosuppressants         Stop Route Frequency     cycloSPORINE modified capsule 125 mg      -- Oral 2 times daily        CBC:     Recent Labs  Lab 12/13/17  0430   WBC 3.59*   RBC 2.64*   HGB 8.3*   HCT 24.4*   *   MCV 92   MCH 31.4*   MCHC 34.0     CMP:     Recent Labs  Lab 12/13/17  0430      CALCIUM 8.6*   ALBUMIN 1.9*   PROT 4.8*   *   K 4.0   CO2 25   CL 99   BUN 24*   CREATININE 3.4*   ALKPHOS 93   ALT <5*   AST 17   BILITOT 2.4*     Coagulation:     Recent Labs  Lab 12/11/17  0500   INR 1.1     Cardiac Markers: No results for input(s): CKMB, TROPONINT, MYOGLOBIN in the last 168 hours.  Labs within the past 24 hours have been reviewed.    Diagnostic Results:  I have personally reviewed all pertinent imaging studies.

## 2017-12-13 NOTE — PROGRESS NOTES
[facility] Report received from SISSY Telles. Dressing to abdomen dry and intact.

## 2017-12-13 NOTE — PROGRESS NOTES
Patient transported off unit to IR per IR RN. Albumin administered per order prior to patient leaving floor. ST on telemetry. Ambulated from bed to stretcher with standby assistance. In NAD.

## 2017-12-13 NOTE — PLAN OF CARE
Problem: Patient Care Overview  Goal: Plan of Care Review  Outcome: Ongoing (interventions implemented as appropriate)  Pt is AAOx4 in bed wearing non-skid footwear, bed in low/locked position and with call bell within reach. Pt reminded to use call bell to call for assistance, pt verbalizes understanding. Mother at bedside. Tmax 100 this shift. Proper hand hygiene performed before and after pt care activities. IV Zosyn administered. Wound vac in place to chevron incision, dressing CDI and pump functioning properly. TPN infusing at 50cc/hr. Patient c/o abdominal pain earlier in shift, PRN Oxycodone administered. Patient currently resting comfortably.

## 2017-12-13 NOTE — PROGRESS NOTES
[facility]  Nephrology  Progress Note    Patient Name: Jhonny Diana  MRN: [id number]  Admission Date: 12/3/2017  Hospital Length of Stay: 9 days  Attending Provider: Solis Chapa MD   Primary Care Physician: Primary Doctor No  Principal Problem:Fever    Subjective:     HPI: Mr. Diana is a 26yo man w/a history of alcoholic cirrhosis s/p DDLT 10/19/2017; c/b seizures (swtiched off prograf to cyclo), ATN requiring HD (last HD 11/17), superificial wound infection s/p wound vac to chevron incision, and recent admit for fevers on 11/21 (discharged on empiric augmentin for suspected superficial wound infection) and readmitted 11/24 again with fever. Found to have peritonitis (WBC 5000, 75% PNM) neg for bile leak. He was treated initially with vanc/cefepime. Repeat cell counts 11/29 with some improvement (WBC 1400, 45% PNM). Pt transitioned to augmentin upon d/c 11/30.      Pt presented to the ED this morning with subjective fever (100.5) and general malaise beginning at 8pm yesterday. His abdomen is soft, but diffusely tender. Labs reveal elevated WBC count to 12.7 from 6.5 in addition to elevated tbili 3.5 from 2.3 when compared to labs 3 days ago, despite being on augmentin for recent diagnosis of peritonitis. He denies nausea, vomiting, diarrhea, congestion, cough, CP or SOB. He has received 1 L bolus NS in the ED.    Interval History: JOCELIN, Held HD yesterday 2/2 low BP post paracenthesis. Plan for PRBC's and albumin today. HD today with UF. Almost anuric per Chart I/o's. UO 30 ml overnight plus 2 unmeasured voids. I/O's nor accurate    Review of patient's allergies indicates:   Allergen Reactions    Bactrim [sulfamethoxazole-trimethoprim] Other (See Comments)     Mookie Trell Syndrome     Current Facility-Administered Medications   Medication Frequency    0.9%  NaCl infusion (for blood administration) Q24H PRN    0.9%  NaCl infusion PRN    acetaminophen tablet 650 mg Q8H PRN    albumin human  25% bottle 25 g PRN    atovaquone suspension 1,500 mg Daily    bisacodyl suppository 10 mg Daily PRN    cycloSPORINE modified capsule 125 mg BID    docusate sodium capsule 100 mg TID PRN    fat emulsion 20% infusion 250 mL Daily    heparin (porcine) injection 1,000 Units PRN    heparin (porcine) injection 5,000 Units Q8H    isavuconazonium sulfate Cap 372 mg Daily    levETIRAcetam tablet 500 mg BID    levothyroxine tablet 75 mcg Before breakfast    midodrine tablet 5 mg PRN    ondansetron disintegrating tablet 8 mg Q8H PRN    ondansetron injection 4 mg Q8H PRN    oxyCODONE immediate release tablet 10 mg Q3H PRN    oxyCODONE immediate release tablet 5 mg Q3H PRN    pantoprazole EC tablet 40 mg Daily    piperacillin-tazobactam 4.5 g in dextrose 5 % 100 mL IVPB (ready to mix system) Q12H    polyethylene glycol packet 17 g Daily    ramelteon tablet 8 mg Nightly PRN    simethicone chewable tablet 80 mg TID PRN    sodium chloride 0.9% flush 3 mL PRN    TPN ADULT CENTRAL LINE CUSTOM Continuous    TPN ADULT CENTRAL LINE CUSTOM Continuous    ursodiol capsule 300 mg BID    [START ON 12/13/2017] valGANciclovir tablet 450 mg Every Mon, Wed, Fri       Objective:     Vital Signs (Most Recent):  Temp: 99.1 °F (37.3 °C) (12/12/17 1730)  Pulse: 99 (12/12/17 1758)  Resp: 18 (12/12/17 1758)  BP: 106/71 (12/12/17 1758)  SpO2: 99 % (12/12/17 1226)  O2 Device (Oxygen Therapy): room air (12/12/17 1758) Vital Signs (24h Range):  Temp:  [98 °F (36.7 °C)-99.6 °F (37.6 °C)] 99.1 °F (37.3 °C)  Pulse:  [] 99  Resp:  [17-20] 18  SpO2:  [98 %-99 %] 99 %  BP: ()/(53-71) 106/71     Weight: 80.3 kg (177 lb 0.5 oz) (12/11/17 0400)  Body mass index is 28.57 kg/m².  Body surface area is 1.93 meters squared.    I/O last 3 completed shifts:  In: 2757.7 [P.O.:600; I.V.:200; Other:879; IV Piggyback:450]  Out: 7500 [Urine:100; Other:7400]    Physical Exam   Constitutional: He is oriented to person, place, and time. He  has a sickly appearance. No distress. Nasal cannula in place.   HENT:   Head: Normocephalic and atraumatic.   Eyes: Pupils are equal, round, and reactive to light. Scleral icterus is present.   Neck: Normal range of motion. Neck supple. No JVD present.   Cardiovascular: Regular rhythm.  Tachycardia present.  Exam reveals no gallop and no friction rub.    No murmur heard.  Pulmonary/Chest: Effort normal. He has decreased breath sounds in the right lower field and the left lower field. He has no wheezes. He has no rales.   Abdominal: Soft. He exhibits distension (positive asitis ). There is no tenderness. There is no guarding.   Wound vac to incision. Positive ascitis    Musculoskeletal: He exhibits edema (2+ pitting edema in LE ).   Neurological: He is alert and oriented to person, place, and time.   Positive Asterixis, somnolent and nauseated   Skin: Skin is warm and dry. Capillary refill takes less than 2 seconds. He is not diaphoretic.   Psychiatric: His behavior is normal. He exhibits a depressed mood.   Nursing note and vitals reviewed.      Significant Labs:  ABGs: No results for input(s): PH, PCO2, HCO3, POCSATURATED, BE in the last 168 hours.  BMP:     Recent Labs  Lab 12/12/17  0607      CL 99   CO2 21*   BUN 42*   CREATININE 4.9*   CALCIUM 8.2*   MG 1.5*     CBC:     Recent Labs  Lab 12/12/17  0606   WBC 3.29*   RBC 2.39*   HGB 7.6*   HCT 22.4*   *   MCV 94   MCH 31.8*   MCHC 33.9     CMP:     Recent Labs  Lab 12/12/17  0607      CALCIUM 8.2*   ALBUMIN 1.7*   PROT 4.5*   *   K 3.6   CO2 21*   CL 99   BUN 42*   CREATININE 4.9*   ALKPHOS 74   ALT <5*   AST 18   BILITOT 2.3*     All labs within the past 24 hours have been reviewed.     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  He was admitted for peritonitis from 11/24-11/30 for peritonitis.     Plan:   - LAURA on CKD 4 (baseline sCr 3.9 - 4.0). Likely instrinsic etiology, multifactorial secondary to sepsis vs. Toxins  - Cont sodium biacarbonate 1,300 mg q TID for acidosis. Please.   - HD x 3 hrs  Today for metabolic clearance and volume management   - Target 1-2 lt as tolerated keep MAP > 65  - would recommend to maintain Hg > 8 for improved chance on renal recovery.  - Avoid nephrotoxic medication, renal dose medications and avoid contrast   - Renal diet            Thank you for your consult. I will follow-up with patient. Please contact us if you have any additional questions.    Krish Frausto MD  Nephrology  [facility]    ATTENDING PHYSICIAN ATTESTATION  I have personally interviewed and examined the patient. I thoroughly reviewed the demographic, clinical, laboratorial and imaging information available in medical records. I agree with the assessment and recommendations provided by the subspecialty resident Dr. Frausto who was under my supervision.

## 2017-12-13 NOTE — ASSESSMENT & PLAN NOTE
- Continue cyclosporine  - Hold Cellcept for infection  - Will monitor for signs of toxic side effects, check daily Cyclosporine troughs, and change meds accordingly.

## 2017-12-13 NOTE — PLAN OF CARE
Problem: Patient Care Overview  Goal: Plan of Care Review  Outcome: Ongoing (interventions implemented as appropriate)  3 hour dialysis complete.  Blood rinsed back.  RIJ permcath flushed with normal saline, both lumens filled with heparin, capped and taped.  Net UF 2L.  Tolerated well.  One unit of PRBC's given.

## 2017-12-13 NOTE — PROGRESS NOTES
"Update:  SW met with pt and pt's mother Sanjuanita ([phone]) in order to provide continuity of care and emotional support. Pt was alert and oriented x 4 and reports that abdominal pain is much improved since drain placement. Pt states that he still feels some pain when his "organs shift" from time to time. Mom reports that last night was difficult as pt in a lot of pain.  Pt reports that currently, his HD regimen is day to day. Pt and mother aware that HD chair set up for start date of [place]18 [place] [facility]. SW explained that [facility] [place] up to date in the event that chair date needs to be pushed back. Pt and mother expressed understanding. Pt's mother was feeding pt an ice cream sandwich with protein powder sprinkled on the ice-cream as pt having difficult time tolerating food. Pt was able to successfully eat most of the sandwich. Pt still reports minimal appetite and recognizes that this is why he is on TPN. Pt reports that he spoke with his employer this morning and his job is still secured. Per mother, job still paying pt's insurance premium but pt still waiting to hear from [employer] regarding payment. Employer is providing pt a check through a charitable fund collected for employees with medical needs. Pt reports that this check will be a couple thousand dollars and will be really helpful to the pt. At this time, pt and mother had no further questions or concerns. SW remains available for continued psychosocial support, education, resources, and additional d/c planning as needed.   "

## 2017-12-13 NOTE — PROGRESS NOTES
[facility]  Liver Transplant  Progress Note    Patient Name: Jhonny Diana  MRN: [id number]  Admission Date: 12/3/2017  Hospital Length of Stay: 10 days  Code Status: Full Code  Primary Care Provider: Primary Doctor No  Post-Operative Day: 55    ORGAN:   LIVER  Disease Etiology: Acute Alcoholic Hepatitis  Donor Type:    - Brain Death  CDC High Risk:   No  Donor CMV Status:   Donor CMV Status: Positive  Donor HBcAB:   Negative  Donor HCV Status:   Negative  Whole or Partial: Whole Liver  Biliary Anastomosis: End to End  Arterial Anatomy: Standard  Subjective:     History of Present Illness:  Mr. Diana is a 28yo man w/a history of alcoholic cirrhosis s/p DDLT 10/19/2017; c/b seizures (swtiched off prograf to cyclo), ATN requiring HD (last HD ), superificial wound infection s/p wound vac to chevron incision, and recent admit for fevers on  (discharged on empiric augmentin for suspected superficial wound infection) and readmitted  again with fever. Found to have peritonitis (WBC 5000, 75% PNM) neg for bile leak. He was treated initially with vanc/cefepime. Repeat cell counts  with some improvement (WBC 1400, 45% PNM). Pt transitioned to augmentin upon d/c .      Pt presented to the ED 12/3 morning with subjective fever (100.5) and general malaise beginning at 8pm day prior. His abdomen soft, but diffusely tender. Labs reveal elevated WBC count to 12.7 from 6.5 in addition to elevated tbili 3.5 from 2.3 when compared to labs 3 days ago, despite being on augmentin for recent diagnosis of peritonitis. He denied nausea, vomiting, diarrhea, congestion, cough, CP or SOB. He received 1 L bolus NS in the ED. Infectious mosley intiated, BS abx (vanc/cef) started, and obtained CT abdomen.     He has a wound vac in place.     Hospital Course:  Interval History:      : Pt in slightly better spirits today, joking with team. Pt remains with abdominal pain which is improving as  peritonitis continues to resolve (last para ). Bili trending down at 5.2 today, cont ursodiol. PO intake remains poor, will place SHANE tube and initiate tube feeds today, orders placed. Will give albumin x1. Per nephrology will plan for HD today. H/H low on morning labs will plan for 1 unit PRBC with HD. Of note, TSH checked yesterday, level of 11.4 with T4 <3.0, endo consulted- will start levothyroxine today. Cont to encourage PO intake and ambulation. PT ordered 12/7/17, unable to see patient today - will work with pt this weekend. Monitor.     12/9:  Pt with signif nausea and abd pain this AM.  D/c TF.  Place central line and will initiate TPN.  CT A/P obtained today with signif ascites.  Plan IR vs washout in OR.  ID consulted and recommend washout.  Pt transitioned to Zosyn and started on isavu.  Will change PO meds to shane as much as able.      12/10:  Pt with signif improvement today in nausea and feels hungry.  CT A/P 12/9 with signif ascites.  Plan for IR para with drain placement in AM.  Continue Zosyn/Vanc/Isavu.  Will reorder TPN.  D/c shane in AM if not going to transition back to TF.    12/11:  D/c shane.  Continue TPN.  Nausea still improved.  Continue current abx.  Para today with 4.5 L removed.  HD cancelled.  Ascitic fluid labs pending.  Pt NPO after midnight.  Blood/urine cx 12/9 NGTD.    12/12:  Cell count negative from para yesterday.  Pt without nausea today- was able to eat a small amount.  Calorie count initiated.  Plan for IR drain placement tomorrow.  HD today with blood transfusion.    12/13:  Drain placed in IR this AM with WBC/diff negative for infection.  2.5 L total removed- replaced with albumin 25% 100 cc.  Will type and screen tomorrow and tx if < 8 in HD.  Remains slightly tachy/hypotensive but improved.  Eating minimally- reorder TPN.      Scheduled Meds:   atovaquone  1,500 mg Oral Daily    bisacodyl  10 mg Rectal Once    cycloSPORINE modified (NEORAL)  125 mg Oral BID     docusate sodium  100 mg Oral TID PC    fat emulsion 20%  250 mL Intravenous Daily    heparin (porcine)  5,000 Units Subcutaneous Q8H    isavuconazonium sulfate  372 mg Oral Daily    levETIRAcetam  500 mg Oral BID    levothyroxine  75 mcg Oral Before breakfast    pantoprazole  40 mg Oral Daily    piperacillin-tazobactam 4.5 g in dextrose 5 % 100 mL IVPB (ready to mix system)  4.5 g Intravenous Q12H    polyethylene glycol  17 g Oral Daily    ursodiol  300 mg Oral BID    valGANciclovir  450 mg Oral Every Mon, Wed, Fri    vancomycin (VANCOCIN) IVPB  500 mg Intravenous Once     Continuous Infusions:   TPN ADULT CENTRAL LINE CUSTOM 50 mL/hr at 12/12/17 2132    TPN ADULT CENTRAL LINE CUSTOM       PRN Meds:sodium chloride, sodium chloride 0.9%, acetaminophen, albumin human 25%, bisacodyl, heparin (porcine), midodrine, ondansetron, ondansetron, oxyCODONE, oxyCODONE, ramelteon, simethicone, sodium chloride 0.9%    Review of Systems   Constitutional: Positive for activity change, appetite change and fatigue. Negative for chills and fever.   Respiratory: Negative for cough, shortness of breath and wheezing.    Cardiovascular: Positive for leg swelling.   Gastrointestinal: Positive for abdominal distention, abdominal pain and constipation. Negative for nausea and vomiting.   Genitourinary: Negative for difficulty urinating and dysuria.   Skin: Positive for wound.   Allergic/Immunologic: Positive for immunocompromised state.   Neurological: Positive for weakness.   Psychiatric/Behavioral: Negative for confusion and decreased concentration. The patient is not nervous/anxious.      Objective:     Vital Signs (Most Recent):  Temp: 99.9 °F (37.7 °C) (12/13/17 1213)  Pulse: 105 (12/13/17 1213)  Resp: 16 (12/13/17 1213)  BP: (!) 103/56 (12/13/17 1213)  SpO2: 95 % (12/13/17 1213) Vital Signs (24h Range):  Temp:  [99.1 °F (37.3 °C)-100.1 °F (37.8 °C)] 99.9 °F (37.7 °C)  Pulse:  [] 105  Resp:  [15-20] 16  SpO2:  [95  %-100 %] 95 %  BP: ()/(53-71) 103/56     Weight: 74.8 kg (164 lb 14.5 oz)  Body mass index is 26.62 kg/m².    Intake/Output - Last 3 Shifts       12/11 0700 - 12/12 0659 12/12 0700 - 12/13 0659 12/13 0700 - 12/14 0659    P.O. 600 240     I.V. (mL/kg) 200 (2.5)      Other  879     NG/GT       IV Piggyback 450      .7 1855.9     Total Intake(mL/kg) 1878.7 (23.4) 2974.9 (39.8)     Urine (mL/kg/hr) 30 (0) 70 (0) 40 (0.1)    Other 4500 (2.3) 2900 (1.6) 2495 (4.3)    Stool   0 (0)    Total Output 4530 2970 2535    Net -2651.3 +4.9 -2535           Urine Occurrence  0 x     Stool Occurrence  1 x 1 x          Physical Exam   Constitutional: He is oriented to person, place, and time. He appears well-developed. No distress.   Temporal and distal extremity muscle wasting   HENT:   Head: Normocephalic and atraumatic.   Eyes: No scleral icterus.   Neck: Normal range of motion. Neck supple. No JVD present.   Cardiovascular: Regular rhythm and normal heart sounds.  Tachycardia present.    No murmur heard.  Pulmonary/Chest: Effort normal. No respiratory distress. He has decreased breath sounds in the right middle field, the right lower field and the left lower field. He has no wheezes. He exhibits no tenderness.   Abdominal: Soft. He exhibits distension. There is tenderness (diffusely ). There is no rebound and no guarding.   Wound vac to incision   Musculoskeletal: Normal range of motion. He exhibits edema (2+ LE edema). He exhibits no tenderness.   Neurological: He is alert and oriented to person, place, and time. He has normal reflexes.   Skin: Skin is warm and dry. He is not diaphoretic.   Psychiatric: His behavior is normal. Judgment and thought content normal. His mood appears anxious.   Pt anxious and expressing feeling overwhelmed   Nursing note and vitals reviewed.      Laboratory:  Immunosuppressants         Stop Route Frequency     cycloSPORINE modified capsule 125 mg      -- Oral 2 times daily        CBC:      Recent Labs  Lab 12/13/17  0430   WBC 3.59*   RBC 2.64*   HGB 8.3*   HCT 24.4*   *   MCV 92   MCH 31.4*   MCHC 34.0     CMP:     Recent Labs  Lab 12/13/17  0430      CALCIUM 8.6*   ALBUMIN 1.9*   PROT 4.8*   *   K 4.0   CO2 25   CL 99   BUN 24*   CREATININE 3.4*   ALKPHOS 93   ALT <5*   AST 17   BILITOT 2.4*     Coagulation:     Recent Labs  Lab 12/11/17  0500   INR 1.1     Cardiac Markers: No results for input(s): CKMB, TROPONINT, MYOGLOBIN in the last 168 hours.  Labs within the past 24 hours have been reviewed.    Diagnostic Results:  I have personally reviewed all pertinent imaging studies.    Assessment/Plan:     * Fever    - See peritonitis  - Blood/urine cx repeated 12/9 NGTD          Liver transplanted    - A/w fevers and hyperbilirubinemia.   - LFTs improving.  - PBS consulted. ERCP attempted on 12/4 but unable to cannulate. MRCP on 12/4 noted with stricture.  - ERCP 12/6/17  With post-anastamosis stricture, stent placed.   - Para 12/5/17, 3L removed ( down from 1497, cr 5.2, bili 2.5)          LAURA (acute kidney injury)    -  Creatinine significantly elevated on 12/6 and HD resumed- was not on HD at admit as kidney function had improved.   - HD last 12/12.  - UOP has trended down daily and is now minimal.  - Cont strict I/O's.           Long-term use of immunosuppressant medication    - Continue cyclosporine  - Hold Cellcept for infection  - Will monitor for signs of toxic side effects, check daily Cyclosporine troughs, and change meds accordingly.        Prophylactic immunotherapy    - See long term immunosuppressant          At risk for opportunistic infections    - Continue Valcyte for CMV proph  - Continue Bactrim for PCP proph          Hypothyroid    -TSH 11.4  -T4 ,3.0  - Endo consulted. Start levothyroxine 75mcg PO. Instructions for administration given. Appreciate recs.           Severe malnutrition    - Pt malnourished with poor PO intake  - SHANE tube placed 12/8/17  and TF started  - Not tolerating TF --> central line placed and TPN started 12/9  - Remove dylan 12/11        Biliary stricture of transplanted liver    - Elevated t bili post liver txp in setting of abdominal pain, decreased appetitive and intermittent nausea.  PBS consulted to assess duct per ERCP.  - ERCP performed 12/5 PBS unable to cannulate. MRCP with stricture.  - ERCP 12/6/17 with post-anastamosis stricture with stent placed.   - Tbili peaked at 9.1, now trending down.   -started ursodiol 12/7/17.   - T bili has remained around 2 for last several days --> monitor closely.        Peritonitis    - Diagnosed on previous admission.  Last para 11/29 with improving cell counts WBC 1400 with 45% PNMs   Was d/c'ed on augmentin, switched to vanc/cef at admit  - Repeat paracentesis 12/5/17, with  (improving from 1497)  - CT A/P repeated 12/9 with signif ascites  - Abx transitioned to Zosyn/Vanc and Isavu added 12/9 with signif clinical improvement  - Para 12/11 with 4.5 L removed - cell ct signif improved and negative for infection  - IR drain placed 1213 to drain remainder of ascites --> 2.5 L removed and replaced albumin 25% 100 cc x 2.  Cell count negative for infection  - Continue zosyn/vanc/isavu            Anemia of chronic disease    - 1 unit PRBC with HD 12/8/17  - 1 u PRBC with HD 12/12  - Discussed Fe studies with nephrology- will continue PRN transfusions as with minimal Fe stores  - Monitor CBC daily        Delayed surgical wound healing    - Wound vac in place, last changed 12/12 and healing well        Seizure    - Cont keppra              VTE Risk Mitigation         Ordered     heparin (porcine) injection 5,000 Units  Every 8 hours     Route:  Subcutaneous        12/11/17 1126     heparin (porcine) injection 1,000 Units  As needed (PRN)     Route:  Intra-Catheter        12/08/17 1736     Medium Risk of VTE  Once      12/03/17 0249     Place sequential compression device  Until discontinued       12/03/17 0249          The patients clinical status was discussed at multidisplinary rounds, involving transplant surgery, transplant medicine, pharmacy, nursing, nutrition, and social work    Discharge Planning:  Monitor kidney function --> may need outpt HD  Monitor HH needs vs rehab for deconditioned status      Geronimo Vera PAShahrzadC  Liver Transplant  [facility]

## 2017-12-13 NOTE — ASSESSMENT & PLAN NOTE
- 1 unit PRBC with HD 12/8/17  - 1 u PRBC with HD 12/12  - Discussed Fe studies with nephrology- will continue PRN transfusions as with minimal Fe stores  - Monitor CBC daily

## 2017-12-13 NOTE — ASSESSMENT & PLAN NOTE
- Diagnosed on previous admission.  Last para 11/29 with improving cell counts WBC 1400 with 45% PNMs   Was d/c'ed on augmentin, switched to vanc/cef at admit  - Repeat paracentesis 12/5/17, with  (improving from 1497)  - CT A/P repeated 12/9 with signif ascites  - Abx transitioned to Zosyn/Vanc and Isavu added 12/9 with signif clinical improvement  - Para 12/11 with 4.5 L removed - cell ct signif improved and negative for infection  - IR drain placed 1213 to drain remainder of ascites --> 2.5 L removed and replaced albumin 25% 100 cc x 2.  Cell count negative for infection  - Continue zosyn/vanc/isavu

## 2017-12-13 NOTE — H&P
Inpatient Radiology Pre-procedure Note    History of Present Illness:  Jhonny Diana is a 27 y.o. male who presents for abdominal drain placement.  Admission H&P reviewed.  Past Medical History:   Diagnosis Date    Alcoholic hepatitis with ascites     Alcoholic hepatitis with ascites     History of hematemesis 9/28/2017    Hypertension     Renal disorder      Past Surgical History:   Procedure Laterality Date    APPENDECTOMY         Review of Systems:   As documented in primary team H&P    Home Meds:   Prior to Admission medications    Medication Sig Start Date End Date Taking? Authorizing Provider   amoxicillin-clavulanate 500-125mg (AUGMENTIN) 500-125 mg Tab Take 1 tablet (500 mg total) by mouth 2 (two) times daily. 11/30/17   Solis Chapa MD   aspirin (ECOTRIN) 81 MG EC tablet Take 1 tablet (81 mg total) by mouth once daily. 11/8/17   Nathanael Medina MD   atovaquone (MEPRON) 750 mg/5 mL Susp Take 10 mLs (1,500 mg total) by mouth once daily. Stop on 4/17/18 10/19/17 4/17/18  Nathanael Medina MD   cycloSPORINE modified, NEORAL, (NEORAL) 100 MG capsule Take 1 capsule (100 mg total) by mouth 2 (two) times daily. 11/30/17   Solis Chapa MD   docusate sodium (COLACE) 100 MG capsule Take 100 mg by mouth 3 (three) times daily as needed for Constipation.    Historical Provider, MD   ergocalciferol (ERGOCALCIFEROL) 50,000 unit Cap Take 1 capsule (50,000 Units total) by mouth every 7 days. 11/7/17   Nathanael Medina MD   levETIRAcetam (KEPPRA) 500 MG Tab Take 1 tablet (500 mg total) by mouth 2 (two) times daily. 11/7/17 11/7/18  Nathanael Medina MD   multivitamin (THERAGRAN) tablet Take 1 tablet by mouth once daily. 11/7/17   Nathanael Medina MD   mycophenolate (CELLCEPT) 250 mg Cap Take 2 capsules (500 mg total) by mouth 2 (two) times daily. 11/21/17   Alin López Jr., MD   ondansetron (ZOFRAN-ODT) 8 MG TbDL Take 1 tablet (8 mg total) by mouth every 8 (eight) hours as needed (nausea). 11/9/17   Nathanael Medina MD    oxyCODONE-acetaminophen (PERCOCET)  mg per tablet Take 0.5-1 tablets by mouth every 4 (four) hours as needed for Pain. 11/30/17   Geronimo Vera PA-C   pantoprazole (PROTONIX) 40 MG tablet Take 1 tablet (40 mg total) by mouth once daily. 11/8/17   Nathanael Medina MD   predniSONE (DELTASONE) 10 MG tablet Take 20mg PO QD 10/29-11/4; 15mg PO QD 11/5-11/11; 10mg PO QD 11/12-11/18; 5mg PO QD 11/19-11/25; 2.5mg PO QD 11/26-12/2; stop on 12/2/17 11/8/17   Nathanael Medina MD   sodium bicarbonate 650 MG tablet Take 2 tablets (1,300 mg total) by mouth 2 (two) times daily. 11/30/17   Geronimo Vera PA-C   thiamine 100 MG tablet Take 1 tablet (100 mg total) by mouth once daily. 11/7/17   Nathanael Medina MD   valGANciclovir (VALCYTE) 450 mg Tab Take 1 tablet (450 mg total) by mouth every Mon, Wed, Fri. Stop on 1/17/18 11/22/17   Alin López Jr., MD     Scheduled Meds:    atovaquone  1,500 mg Oral Daily    cycloSPORINE modified (NEORAL)  125 mg Oral BID    fat emulsion 20%  250 mL Intravenous Daily    heparin (porcine)  5,000 Units Subcutaneous Q8H    isavuconazonium sulfate  372 mg Oral Daily    levETIRAcetam  500 mg Oral BID    levothyroxine  75 mcg Oral Before breakfast    pantoprazole  40 mg Oral Daily    piperacillin-tazobactam 4.5 g in dextrose 5 % 100 mL IVPB (ready to mix system)  4.5 g Intravenous Q12H    polyethylene glycol  17 g Oral Daily    ursodiol  300 mg Oral BID    valGANciclovir  450 mg Oral Every Mon, Wed, Fri     Continuous Infusions:    TPN ADULT CENTRAL LINE CUSTOM 50 mL/hr at 12/12/17 2132     PRN Meds:sodium chloride, sodium chloride 0.9%, acetaminophen, albumin human 25%, bisacodyl, docusate sodium, heparin (porcine), midodrine, ondansetron, ondansetron, oxyCODONE, oxyCODONE, ramelteon, simethicone, sodium chloride 0.9%  Anticoagulants/Antiplatelets: no anticoagulation    Allergies:   Review of patient's allergies indicates:   Allergen Reactions    Bactrim  [sulfamethoxazole-trimethoprim] Other (See Comments)     Mookie Trell Syndrome     Sedation Hx: have not been any systemic reactions    Labs:    Recent Labs  Lab 12/11/17  0500   INR 1.1       Recent Labs  Lab 12/13/17  0430   WBC 3.59*   HGB 8.3*   HCT 24.4*   MCV 92   *      Recent Labs  Lab 12/13/17 0430      *   K 4.0   CL 99   CO2 25   BUN 24*   CREATININE 3.4*   CALCIUM 8.6*   MG 2.1   ALT <5*   AST 17   ALBUMIN 1.9*   BILITOT 2.4*         Vitals:  Temp: 99.8 °F (37.7 °C) (12/13/17 0530)  Pulse: (!) 119 (12/13/17 0725)  Resp: 17 (12/13/17 0530)  BP: (!) 109/56 (12/13/17 0725)  SpO2: 96 % (12/13/17 0530)     Physical Exam:  ASA: 3  Mallampati: 2    General: no acute distress  Mental Status: alert and oriented to person, place and time  HEENT: normocephalic, atraumatic  Chest: unlabored breathing  Heart: regular heart rate  Abdomen: nondistended  Extremity: moves all extremities    Plan: drain placement  Sedation Plan: moderate    Rajesh Torres MD  Radiology

## 2017-12-13 NOTE — ASSESSMENT & PLAN NOTE
-  Creatinine significantly elevated on 12/6 and HD resumed- was not on HD at admit as kidney function had improved.   - HD last 12/12.  - UOP has trended down daily and is now minimal.  - Cont strict I/O's.

## 2017-12-13 NOTE — SUBJECTIVE & OBJECTIVE
Interval History: JOCELIN, Held HD yesterday 2/2 low BP post paracenthesis. Plan for PRBC's and albumin today. HD today with UF. Almost anuric per Chart I/o's. UO 30 ml overnight plus 2 unmeasured voids. I/O's nor accurate    Review of patient's allergies indicates:   Allergen Reactions    Bactrim [sulfamethoxazole-trimethoprim] Other (See Comments)     Mookie Trell Syndrome     Current Facility-Administered Medications   Medication Frequency    0.9%  NaCl infusion (for blood administration) Q24H PRN    0.9%  NaCl infusion PRN    acetaminophen tablet 650 mg Q8H PRN    albumin human 25% bottle 25 g PRN    atovaquone suspension 1,500 mg Daily    bisacodyl suppository 10 mg Daily PRN    cycloSPORINE modified capsule 125 mg BID    docusate sodium capsule 100 mg TID PRN    fat emulsion 20% infusion 250 mL Daily    heparin (porcine) injection 1,000 Units PRN    heparin (porcine) injection 5,000 Units Q8H    isavuconazonium sulfate Cap 372 mg Daily    levETIRAcetam tablet 500 mg BID    levothyroxine tablet 75 mcg Before breakfast    midodrine tablet 5 mg PRN    ondansetron disintegrating tablet 8 mg Q8H PRN    ondansetron injection 4 mg Q8H PRN    oxyCODONE immediate release tablet 10 mg Q3H PRN    oxyCODONE immediate release tablet 5 mg Q3H PRN    pantoprazole EC tablet 40 mg Daily    piperacillin-tazobactam 4.5 g in dextrose 5 % 100 mL IVPB (ready to mix system) Q12H    polyethylene glycol packet 17 g Daily    ramelteon tablet 8 mg Nightly PRN    simethicone chewable tablet 80 mg TID PRN    sodium chloride 0.9% flush 3 mL PRN    TPN ADULT CENTRAL LINE CUSTOM Continuous    TPN ADULT CENTRAL LINE CUSTOM Continuous    ursodiol capsule 300 mg BID    [START ON 12/13/2017] valGANciclovir tablet 450 mg Every Mon, Wed, Fri       Objective:     Vital Signs (Most Recent):  Temp: 99.1 °F (37.3 °C) (12/12/17 1730)  Pulse: 99 (12/12/17 1758)  Resp: 18 (12/12/17 1758)  BP: 106/71 (12/12/17 1758)  SpO2: 99  % (12/12/17 1226)  O2 Device (Oxygen Therapy): room air (12/12/17 1758) Vital Signs (24h Range):  Temp:  [98 °F (36.7 °C)-99.6 °F (37.6 °C)] 99.1 °F (37.3 °C)  Pulse:  [] 99  Resp:  [17-20] 18  SpO2:  [98 %-99 %] 99 %  BP: ()/(53-71) 106/71     Weight: 80.3 kg (177 lb 0.5 oz) (12/11/17 0400)  Body mass index is 28.57 kg/m².  Body surface area is 1.93 meters squared.    I/O last 3 completed shifts:  In: 2757.7 [P.O.:600; I.V.:200; Other:879; IV Piggyback:450]  Out: 7500 [Urine:100; Other:7400]    Physical Exam   Constitutional: He is oriented to person, place, and time. He has a sickly appearance. No distress. Nasal cannula in place.   HENT:   Head: Normocephalic and atraumatic.   Eyes: Pupils are equal, round, and reactive to light. Scleral icterus is present.   Neck: Normal range of motion. Neck supple. No JVD present.   Cardiovascular: Regular rhythm.  Tachycardia present.  Exam reveals no gallop and no friction rub.    No murmur heard.  Pulmonary/Chest: Effort normal. He has decreased breath sounds in the right lower field and the left lower field. He has no wheezes. He has no rales.   Abdominal: Soft. He exhibits distension (positive asitis ). There is no tenderness. There is no guarding.   Wound vac to incision. Positive ascitis    Musculoskeletal: He exhibits edema (2+ pitting edema in LE ).   Neurological: He is alert and oriented to person, place, and time.   Positive Asterixis, somnolent and nauseated   Skin: Skin is warm and dry. Capillary refill takes less than 2 seconds. He is not diaphoretic.   Psychiatric: His behavior is normal. He exhibits a depressed mood.   Nursing note and vitals reviewed.      Significant Labs:  ABGs: No results for input(s): PH, PCO2, HCO3, POCSATURATED, BE in the last 168 hours.  BMP:     Recent Labs  Lab 12/12/17  0607      CL 99   CO2 21*   BUN 42*   CREATININE 4.9*   CALCIUM 8.2*   MG 1.5*     CBC:     Recent Labs  Lab 12/12/17  0606   WBC 3.29*   RBC  2.39*   HGB 7.6*   HCT 22.4*   *   MCV 94   MCH 31.8*   MCHC 33.9     CMP:     Recent Labs  Lab 12/12/17  0607      CALCIUM 8.2*   ALBUMIN 1.7*   PROT 4.5*   *   K 3.6   CO2 21*   CL 99   BUN 42*   CREATININE 4.9*   ALKPHOS 74   ALT <5*   AST 18   BILITOT 2.3*     All labs within the past 24 hours have been reviewed.     Significant Imaging:  Labs: Reviewed  X-Ray: Reviewed

## 2017-12-13 NOTE — ASSESSMENT & PLAN NOTE
- Elevated t bili post liver txp in setting of abdominal pain, decreased appetitive and intermittent nausea.  PBS consulted to assess duct per ERCP.  - ERCP performed 12/5 PBS unable to cannulate. MRCP with stricture.  - ERCP 12/6/17 with post-anastamosis stricture with stent placed.   - Tbili peaked at 9.1, now trending down.   -started ursodiol 12/7/17.   - T bili has remained around 2 for last several days --> monitor closely.

## 2017-12-14 PROBLEM — E46 PROTEIN-CALORIE MALNUTRITION: Status: ACTIVE | Noted: 2017-12-14

## 2017-12-14 LAB
ALBUMIN SERPL BCP-MCNC: 2 G/DL
ALP SERPL-CCNC: 85 U/L
ALT SERPL W/O P-5'-P-CCNC: <5 U/L
ANION GAP SERPL CALC-SCNC: 12 MMOL/L
AST SERPL-CCNC: 20 U/L
BACTERIA BLD CULT: NORMAL
BACTERIA BLD CULT: NORMAL
BASOPHILS # BLD AUTO: 0.02 K/UL
BASOPHILS NFR BLD: 0.7 %
BILIRUB SERPL-MCNC: 2.1 MG/DL
BUN SERPL-MCNC: 31 MG/DL
CALCIUM SERPL-MCNC: 9 MG/DL
CHLORIDE SERPL-SCNC: 95 MMOL/L
CO2 SERPL-SCNC: 28 MMOL/L
CREAT SERPL-MCNC: 4.7 MG/DL
CYCLOSPORINE BLD LC/MS/MS-MCNC: 183 NG/ML
DIFFERENTIAL METHOD: ABNORMAL
EOSINOPHIL # BLD AUTO: 0 K/UL
EOSINOPHIL NFR BLD: 0.3 %
ERYTHROCYTE [DISTWIDTH] IN BLOOD BY AUTOMATED COUNT: 16.3 %
EST. GFR  (AFRICAN AMERICAN): 18.2 ML/MIN/1.73 M^2
EST. GFR  (NON AFRICAN AMERICAN): 15.7 ML/MIN/1.73 M^2
GLUCOSE SERPL-MCNC: 99 MG/DL
HCT VFR BLD AUTO: 23.2 %
HGB BLD-MCNC: 7.9 G/DL
IMM GRANULOCYTES # BLD AUTO: 0.11 K/UL
IMM GRANULOCYTES NFR BLD AUTO: 3.7 %
LYMPHOCYTES # BLD AUTO: 0.8 K/UL
LYMPHOCYTES NFR BLD: 26.1 %
MAGNESIUM SERPL-MCNC: 2.2 MG/DL
MCH RBC QN AUTO: 31.6 PG
MCHC RBC AUTO-ENTMCNC: 34.1 G/DL
MCV RBC AUTO: 93 FL
MONOCYTES # BLD AUTO: 0.7 K/UL
MONOCYTES NFR BLD: 23.1 %
NEUTROPHILS # BLD AUTO: 1.4 K/UL
NEUTROPHILS NFR BLD: 46.1 %
NRBC BLD-RTO: 0 /100 WBC
PHOSPHATE SERPL-MCNC: 4.7 MG/DL
PLATELET # BLD AUTO: 176 K/UL
PMV BLD AUTO: 10.5 FL
POTASSIUM SERPL-SCNC: 4.4 MMOL/L
PROT SERPL-MCNC: 5 G/DL
RBC # BLD AUTO: 2.5 M/UL
SODIUM SERPL-SCNC: 135 MMOL/L
VANCOMYCIN SERPL-MCNC: 14.2 UG/ML
WBC # BLD AUTO: 2.95 K/UL

## 2017-12-14 PROCEDURE — 94664 DEMO&/EVAL PT USE INHALER: CPT

## 2017-12-14 PROCEDURE — 63600175 PHARM REV CODE 636 W HCPCS: Performed by: HOSPITALIST

## 2017-12-14 PROCEDURE — 90935 HEMODIALYSIS ONE EVALUATION: CPT | Mod: ,,, | Performed by: INTERNAL MEDICINE

## 2017-12-14 PROCEDURE — 25000003 PHARM REV CODE 250: Performed by: PHYSICIAN ASSISTANT

## 2017-12-14 PROCEDURE — 99233 SBSQ HOSP IP/OBS HIGH 50: CPT | Mod: 24,,, | Performed by: NURSE PRACTITIONER

## 2017-12-14 PROCEDURE — 90935 HEMODIALYSIS ONE EVALUATION: CPT

## 2017-12-14 PROCEDURE — 80053 COMPREHEN METABOLIC PANEL: CPT

## 2017-12-14 PROCEDURE — A4217 STERILE WATER/SALINE, 500 ML: HCPCS | Performed by: NURSE PRACTITIONER

## 2017-12-14 PROCEDURE — 63600175 PHARM REV CODE 636 W HCPCS: Performed by: PHYSICIAN ASSISTANT

## 2017-12-14 PROCEDURE — 36415 COLL VENOUS BLD VENIPUNCTURE: CPT

## 2017-12-14 PROCEDURE — 80158 DRUG ASSAY CYCLOSPORINE: CPT

## 2017-12-14 PROCEDURE — 94761 N-INVAS EAR/PLS OXIMETRY MLT: CPT

## 2017-12-14 PROCEDURE — 83735 ASSAY OF MAGNESIUM: CPT

## 2017-12-14 PROCEDURE — 63600175 PHARM REV CODE 636 W HCPCS: Performed by: NURSE PRACTITIONER

## 2017-12-14 PROCEDURE — 20600001 HC STEP DOWN PRIVATE ROOM

## 2017-12-14 PROCEDURE — 25000003 PHARM REV CODE 250: Performed by: HOSPITALIST

## 2017-12-14 PROCEDURE — 84100 ASSAY OF PHOSPHORUS: CPT

## 2017-12-14 PROCEDURE — P9047 ALBUMIN (HUMAN), 25%, 50ML: HCPCS | Performed by: HOSPITALIST

## 2017-12-14 PROCEDURE — 85025 COMPLETE CBC W/AUTO DIFF WBC: CPT

## 2017-12-14 PROCEDURE — 25000003 PHARM REV CODE 250: Performed by: NURSE PRACTITIONER

## 2017-12-14 PROCEDURE — 80202 ASSAY OF VANCOMYCIN: CPT

## 2017-12-14 PROCEDURE — 25000003 PHARM REV CODE 250: Performed by: STUDENT IN AN ORGANIZED HEALTH CARE EDUCATION/TRAINING PROGRAM

## 2017-12-14 PROCEDURE — B4185 PARENTERAL SOL 10 GM LIPIDS: HCPCS | Performed by: NURSE PRACTITIONER

## 2017-12-14 RX ORDER — SODIUM CHLORIDE 9 MG/ML
INJECTION, SOLUTION INTRAVENOUS
Status: DISCONTINUED | OUTPATIENT
Start: 2017-12-14 | End: 2017-12-16

## 2017-12-14 RX ORDER — POLYETHYLENE GLYCOL 3350 17 G/17G
17 POWDER, FOR SOLUTION ORAL 2 TIMES DAILY
Status: DISCONTINUED | OUTPATIENT
Start: 2017-12-14 | End: 2017-12-20 | Stop reason: HOSPADM

## 2017-12-14 RX ORDER — SODIUM CHLORIDE 9 MG/ML
INJECTION, SOLUTION INTRAVENOUS ONCE
Status: COMPLETED | OUTPATIENT
Start: 2017-12-14 | End: 2017-12-14

## 2017-12-14 RX ADMIN — OXYCODONE HYDROCHLORIDE 10 MG: 5 TABLET ORAL at 03:12

## 2017-12-14 RX ADMIN — HEPARIN SODIUM 5000 UNITS: 5000 INJECTION, SOLUTION INTRAVENOUS; SUBCUTANEOUS at 02:12

## 2017-12-14 RX ADMIN — ISAVUCONAZONIUM SULFATE 372 MG: 186 CAPSULE ORAL at 08:12

## 2017-12-14 RX ADMIN — OXYCODONE HYDROCHLORIDE 10 MG: 5 TABLET ORAL at 08:12

## 2017-12-14 RX ADMIN — OXYCODONE HYDROCHLORIDE 10 MG: 5 TABLET ORAL at 06:12

## 2017-12-14 RX ADMIN — URSODIOL 300 MG: 300 CAPSULE ORAL at 08:12

## 2017-12-14 RX ADMIN — MIDODRINE HYDROCHLORIDE 5 MG: 5 TABLET ORAL at 04:12

## 2017-12-14 RX ADMIN — LEVETIRACETAM 500 MG: 500 TABLET, FILM COATED ORAL at 09:12

## 2017-12-14 RX ADMIN — DOCUSATE SODIUM 100 MG: 100 CAPSULE, LIQUID FILLED ORAL at 09:12

## 2017-12-14 RX ADMIN — ATOVAQUONE 1500 MG: 750 SUSPENSION ORAL at 08:12

## 2017-12-14 RX ADMIN — DOCUSATE SODIUM 100 MG: 100 CAPSULE, LIQUID FILLED ORAL at 02:12

## 2017-12-14 RX ADMIN — LEVOTHYROXINE SODIUM 75 MCG: 50 TABLET ORAL at 06:12

## 2017-12-14 RX ADMIN — CYCLOSPORINE 125 MG: 100 CAPSULE, LIQUID FILLED ORAL at 08:12

## 2017-12-14 RX ADMIN — OXYCODONE HYDROCHLORIDE 10 MG: 5 TABLET ORAL at 12:12

## 2017-12-14 RX ADMIN — OXYCODONE HYDROCHLORIDE 10 MG: 5 TABLET ORAL at 09:12

## 2017-12-14 RX ADMIN — HEPARIN SODIUM 1000 UNITS: 1000 INJECTION, SOLUTION INTRAVENOUS; SUBCUTANEOUS at 04:12

## 2017-12-14 RX ADMIN — LEVETIRACETAM 500 MG: 500 TABLET, FILM COATED ORAL at 08:12

## 2017-12-14 RX ADMIN — POLYETHYLENE GLYCOL 3350 17 G: 17 POWDER, FOR SOLUTION ORAL at 09:12

## 2017-12-14 RX ADMIN — SODIUM CHLORIDE 300 ML: 0.9 INJECTION, SOLUTION INTRAVENOUS at 03:12

## 2017-12-14 RX ADMIN — ALBUMIN (HUMAN) 25 G: 12.5 SOLUTION INTRAVENOUS at 04:12

## 2017-12-14 RX ADMIN — I.V. FAT EMULSION 250 ML: 20 EMULSION INTRAVENOUS at 09:12

## 2017-12-14 RX ADMIN — DOCUSATE SODIUM 100 MG: 100 CAPSULE, LIQUID FILLED ORAL at 08:12

## 2017-12-14 RX ADMIN — HEPARIN SODIUM 5000 UNITS: 5000 INJECTION, SOLUTION INTRAVENOUS; SUBCUTANEOUS at 06:12

## 2017-12-14 RX ADMIN — PIPERACILLIN SODIUM AND TAZOBACTAM SODIUM 4.5 G: 4; .5 INJECTION, POWDER, LYOPHILIZED, FOR SOLUTION INTRAVENOUS at 12:12

## 2017-12-14 RX ADMIN — PANTOPRAZOLE SODIUM 40 MG: 40 TABLET, DELAYED RELEASE ORAL at 08:12

## 2017-12-14 RX ADMIN — HEPARIN SODIUM 5000 UNITS: 5000 INJECTION, SOLUTION INTRAVENOUS; SUBCUTANEOUS at 09:12

## 2017-12-14 RX ADMIN — SODIUM CHLORIDE: 234 INJECTION INTRAMUSCULAR; INTRAVENOUS; SUBCUTANEOUS at 09:12

## 2017-12-14 RX ADMIN — OXYCODONE HYDROCHLORIDE 10 MG: 5 TABLET ORAL at 04:12

## 2017-12-14 NOTE — PROGRESS NOTES
[facility]  Infectious Disease  Progress Note    Patient Name: Jhonny Diana  MRN: [id number]  Admission Date: 12/3/2017  Length of Stay: 10 days  Attending Physician: Solis Chapa MD  Primary Care Provider: Primary Doctor No    Isolation Status: No active isolations  Assessment/Plan:      Peritonitis     27-year-old male  - Alcoholic cirrhosis (c/b HE, EV, portal HTN, and HRS)  - s/p DDLT 10/19/2017, CMV D+/R+, steroid induction, on maintenance tacro/MMF/pred  - c/b seizures, transitioned from tacro to cyclo  - Superificial wound infection s/p wound vac to chevron   - Readmitted 11/21/2017 for suspected wound infection treated with amox-clav   - Readmitted 11/24/2017 with peritonitis (WBC 5000) discharged on amox-clav  - Biliary stricture s/p sphincterotomy, biliary stent placement in CBD 12/6/2017  - s/p abdominal drain placement 12/13/2017    Patient readmitted for ongoing nausea, vomiting, abdominal pain - reports symptoms have improved with biliary stent placement, antibiotics, and abdominal drain placement. Repeat ascites fluid analysis with resolution of peritonitis.    Recommendations:  - Continue pip-tazo   - Continue isavuconazole for coverage of Candida and mold prophylaxis                 Anticipated Disposition: symptomatic relief    Thank you for your consult. I will follow-up with patient. Please contact us if you have any additional questions.    Marcela Longoria MD  Infectious Disease  [facility]    Subjective:     Principal Problem:Fever    HPI: Case of 26 y/o male PMHx Asthma, Cirrosis 2ry to Etoh abuse, DDLT 10/19/17  Steroid induction, CMV D+/R+. Maintenance tacro/mmf/pred. Complicated with seizures, LAURA, superficial wound infection with wound vac to chevron and peritonitis. Past admission include:     - 11/21/17 Wound infection with wound vac placement D/H on augmentin  - 11/24/17 admit for peritonitis with therapeutic and diagnostic paracentesis. D/H on  Augmentin for 2 weeks        Admitted on 12/3/17 patient presented on this occasion to the ED with subjective fevers 100.5, general malaise since 2 days prior to evaluation. Had also diffuse tenderness of abdomen, nausea. Denied emesis, diarrhea, congestion, cough. On 12/6/17 ERCP was done and stent was place to correct stricture. At time had elevation of WBC of 12 from 6 and elevation of tbili with peak at 9. Both trending down but patient continues with general malaise and no improvement in symptoms since ERCP. ID consulted regarding fungal prophylaxis and antibiotic recommendations.    Interval History:   Patient reported nausea, no vomiting  Abdominal drain placed - immediately 2L fluid drained  Reports abdominal pain improving with decompression    Review of Systems   Constitutional: Negative for chills, diaphoresis and fever.   HENT: Negative for rhinorrhea and sore throat.    Respiratory: Negative for cough and shortness of breath.    Cardiovascular: Negative for chest pain and leg swelling.   Gastrointestinal: Positive for abdominal pain and nausea. Negative for diarrhea and vomiting.   Genitourinary: Negative for dysuria and hematuria.   Musculoskeletal: Negative for arthralgias and myalgias.   Skin: Negative for rash.   Neurological: Negative for headaches.   Psychiatric/Behavioral: The patient is nervous/anxious.      Objective:     Vital Signs (Most Recent):  Temp: 98.6 °F (37 °C) (12/13/17 1630)  Pulse: 102 (12/13/17 1630)  Resp: 18 (12/13/17 1630)  BP: (!) 100/57 (12/13/17 1630)  SpO2: 97 % (12/13/17 1630) Vital Signs (24h Range):  Temp:  [98.6 °F (37 °C)-100.1 °F (37.8 °C)] 98.6 °F (37 °C)  Pulse:  [100-119] 102  Resp:  [15-20] 18  SpO2:  [95 %-100 %] 97 %  BP: ()/(53-69) 100/57     Weight: 74.8 kg (164 lb 14.5 oz)  Body mass index is 26.62 kg/m².    Estimated Creatinine Clearance: 29.2 mL/min (based on SCr of 3.4 mg/dL (H)).    Physical Exam   Constitutional: He is oriented to person, place,  and time. He appears well-developed and well-nourished. No distress.   HENT:   Head: Normocephalic and atraumatic.   Eyes: Conjunctivae and EOM are normal.   Neck: Normal range of motion. Neck supple.   Cardiovascular: Normal rate.    Pulmonary/Chest: Effort normal. No respiratory distress.   Abdominal: Soft. He exhibits distension. There is tenderness.   Drain with serous fluid   Musculoskeletal: Normal range of motion. He exhibits no edema.   Neurological: He is alert and oriented to person, place, and time.   Skin: Skin is warm and dry. No rash noted. He is not diaphoretic. No erythema.   Psychiatric: He has a normal mood and affect. His behavior is normal.   Vitals reviewed.      Significant Labs: All pertinent labs within the past 24 hours have been reviewed.    Significant Imaging: I have reviewed all pertinent imaging results/findings within the past 24 hours.

## 2017-12-14 NOTE — ASSESSMENT & PLAN NOTE
27-year-old male  - Alcoholic cirrhosis (c/b HE, EV, portal HTN, and HRS)  - s/p DDLT 10/19/2017, CMV D+/R+, steroid induction, on maintenance tacro/MMF/pred  - c/b seizures, transitioned from tacro to cyclo  - Superificial wound infection s/p wound vac to chevron   - Readmitted 11/21/2017 for suspected wound infection treated with amox-clav   - Readmitted 11/24/2017 with peritonitis (WBC 5000) discharged on amox-clav  - Biliary stricture s/p sphincterotomy, biliary stent placement in CBD 12/6/2017  - s/p abdominal drain placement 12/13/2017    Patient readmitted for ongoing nausea, vomiting, abdominal pain - reports symptoms have improved with biliary stent placement, antibiotics, and abdominal drain placement. Repeat ascites fluid analysis with resolution of peritonitis.    Recommendations:  - Continue pip-tazo   - Continue isavuconazole for coverage of Candida and mold prophylaxis

## 2017-12-14 NOTE — SUBJECTIVE & OBJECTIVE
Scheduled Meds:   sodium chloride 0.9%   Intravenous Once    atovaquone  1,500 mg Oral Daily    bisacodyl  10 mg Rectal Once    cycloSPORINE modified (NEORAL)  125 mg Oral BID    docusate sodium  100 mg Oral TID PC    fat emulsion 20%  250 mL Intravenous Daily    heparin (porcine)  5,000 Units Subcutaneous Q8H    isavuconazonium sulfate  372 mg Oral Daily    levETIRAcetam  500 mg Oral BID    levothyroxine  75 mcg Oral Before breakfast    pantoprazole  40 mg Oral Daily    piperacillin-tazobactam 4.5 g in dextrose 5 % 100 mL IVPB (ready to mix system)  4.5 g Intravenous Q12H    polyethylene glycol  17 g Oral BID    ursodiol  300 mg Oral BID     Continuous Infusions:   TPN ADULT CENTRAL LINE CUSTOM 50 mL/hr at 12/13/17 2154    TPN ADULT CENTRAL LINE CUSTOM       PRN Meds:sodium chloride 0.9%, acetaminophen, albumin human 25%, bisacodyl, heparin (porcine), midodrine, ondansetron, ondansetron, oxyCODONE, oxyCODONE, ramelteon, simethicone, sodium chloride 0.9%    Review of Systems   Constitutional: Positive for activity change, appetite change and fatigue. Negative for chills and fever.   Respiratory: Negative for cough, shortness of breath and wheezing.    Cardiovascular: Positive for leg swelling.   Gastrointestinal: Positive for abdominal distention, abdominal pain and constipation. Negative for nausea and vomiting.   Genitourinary: Negative for difficulty urinating and dysuria.   Skin: Positive for wound.   Allergic/Immunologic: Positive for immunocompromised state.   Neurological: Positive for weakness.   Psychiatric/Behavioral: Negative for confusion and decreased concentration. The patient is not nervous/anxious.      Objective:     Vital Signs (Most Recent):  Temp: 99.6 °F (37.6 °C) (12/14/17 1149)  Pulse: 107 (12/14/17 1149)  Resp: 18 (12/14/17 1149)  BP: (!) 100/56 (12/14/17 1149)  SpO2: 97 % (12/14/17 1213) Vital Signs (24h Range):  Temp:  [98.4 °F (36.9 °C)-100.4 °F (38 °C)] 99.6 °F (37.6  °C)  Pulse:  [] 107  Resp:  [13-18] 18  SpO2:  [96 %-99 %] 97 %  BP: ()/(51-57) 100/56     Weight: 73.9 kg (162 lb 14.7 oz)  Body mass index is 26.3 kg/m².    Intake/Output - Last 3 Shifts       12/12 0700 - 12/13 0659 12/13 0700 - 12/14 0659 12/14 0700 - 12/15 0659    P.O. 240 940     I.V. (mL/kg)       Other 879      IV Piggyback  200     TPN 1855.9 973.8     Total Intake(mL/kg) 2974.9 (39.8) 2113.8 (28.6)     Urine (mL/kg/hr) 70 (0) 111 (0.1)     Emesis/NG output  0 (0)     Other 2900 (1.6) 3000 (1.7)     Stool  0 (0)     Blood  0 (0)     Total Output 2970 3111      Net +4.9 -997.2             Urine Occurrence 0 x 0 x     Stool Occurrence 1 x 1 x     Emesis Occurrence  0 x           Physical Exam   Constitutional: He is oriented to person, place, and time. He appears well-developed. No distress.   Temporal and distal extremity muscle wasting   HENT:   Head: Normocephalic and atraumatic.   Eyes: No scleral icterus.   Neck: Normal range of motion. Neck supple. No JVD present.   Cardiovascular: Regular rhythm and normal heart sounds.  Tachycardia present.    No murmur heard.  Pulmonary/Chest: Effort normal. No respiratory distress. He has decreased breath sounds in the right middle field, the right lower field and the left lower field. He has no wheezes. He exhibits no tenderness.   Abdominal: Soft. He exhibits distension. There is tenderness (diffuse). There is no rebound and no guarding.   Wound vac to incision   Musculoskeletal: Normal range of motion. He exhibits edema (2+ LE edema). He exhibits no tenderness.   Neurological: He is alert and oriented to person, place, and time. He has normal reflexes.   Skin: Skin is warm and dry. He is not diaphoretic.   Psychiatric: His behavior is normal. Judgment and thought content normal. His mood appears anxious.   Pt anxious and expressing feeling overwhelmed   Nursing note and vitals reviewed.      Laboratory:  Immunosuppressants         Stop Route Frequency      cycloSPORINE modified capsule 125 mg      -- Oral 2 times daily        CBC:     Recent Labs  Lab 12/14/17  0500   WBC 2.95*   RBC 2.50*   HGB 7.9*   HCT 23.2*      MCV 93   MCH 31.6*   MCHC 34.1     CMP:     Recent Labs  Lab 12/14/17  0500   GLU 99   CALCIUM 9.0   ALBUMIN 2.0*   PROT 5.0*   *   K 4.4   CO2 28   CL 95   BUN 31*   CREATININE 4.7*   ALKPHOS 85   ALT <5*   AST 20   BILITOT 2.1*     Coagulation:     Recent Labs  Lab 12/11/17  0500   INR 1.1     Cardiac Markers: No results for input(s): CKMB, TROPONINT, MYOGLOBIN in the last 168 hours.  Labs within the past 24 hours have been reviewed.    Diagnostic Results:  I have personally reviewed all pertinent imaging studies.

## 2017-12-14 NOTE — PLAN OF CARE
Problem: Patient Care Overview  Goal: Plan of Care Review  Outcome: Ongoing (interventions implemented as appropriate)  AAOx3, afebrile, c/o pain frequently to abd with relief. No c/o n/v thus far this shift. Wound vac to chevron due to be changed today. Bili bag in place, CDI please see I/O for output. Pt in lowest postion, side rails up x2, non skid footwear in place, call light within reach, pt verbalized understanding to call the nurse when needed. Hand hygiene practiced per protocol. Pt able to reposition self independently. Will continue to monitor.

## 2017-12-14 NOTE — ASSESSMENT & PLAN NOTE
- hold valcyte as wbc low, check cmv pcr (weekly surveillance)  - Continue Bactrim for PCP proph

## 2017-12-14 NOTE — PROGRESS NOTES
[facility]  Liver Transplant  Progress Note    Patient Name: Jhonny Diana  MRN: [id number]  Admission Date: 12/3/2017  Hospital Length of Stay: 11 days  Code Status: Full Code  Primary Care Provider: Primary Doctor No  Post-Operative Day: 56    ORGAN:   LIVER  Disease Etiology: Acute Alcoholic Hepatitis  Donor Type:    - Brain Death  CDC High Risk:   No  Donor CMV Status:   Donor CMV Status: Positive  Donor HBcAB:   Negative  Donor HCV Status:   Negative  Whole or Partial: Whole Liver  Biliary Anastomosis: End to End  Arterial Anatomy: Standard  Subjective:     History of Present Illness:  Mr. Diana is a 28yo man w/a history of alcoholic cirrhosis s/p DDLT 10/19/2017; c/b seizures (swtiched off prograf to cyclo), ATN requiring HD (last HD ), superificial wound infection s/p wound vac to chevron incision, and recent admit for fevers on  (discharged on empiric augmentin for suspected superficial wound infection) and readmitted  again with fever. Found to have peritonitis (WBC 5000, 75% PNM) neg for bile leak. He was treated initially with vanc/cefepime. Repeat cell counts  with some improvement (WBC 1400, 45% PNM). Pt transitioned to augmentin upon d/c .      Pt presented to the ED 12/3 morning with subjective fever (100.5) and general malaise beginning at 8pm day prior. His abdomen soft, but diffusely tender. Labs reveal elevated WBC count to 12.7 from 6.5 in addition to elevated tbili 3.5 from 2.3 when compared to labs 3 days ago, despite being on augmentin for recent diagnosis of peritonitis. He denied nausea, vomiting, diarrhea, congestion, cough, CP or SOB. He received 1 L bolus NS in the ED. Infectious mosley intiated, BS abx (vanc/cef) started, and obtained CT abdomen.     He has a wound vac in place.     Hospital Course:  Interval History:      : Pt in slightly better spirits today, joking with team. Pt remains with abdominal pain which is improving as  peritonitis continues to resolve (last para ). Bili trending down at 5.2 today, cont ursodiol. PO intake remains poor, will place SHANE tube and initiate tube feeds today, orders placed. Will give albumin x1. Per nephrology will plan for HD today. H/H low on morning labs will plan for 1 unit PRBC with HD. Of note, TSH checked yesterday, level of 11.4 with T4 <3.0, endo consulted- will start levothyroxine today. Cont to encourage PO intake and ambulation. PT ordered 12/7/17, unable to see patient today - will work with pt this weekend. Monitor.     12/9:  Pt with signif nausea and abd pain this AM.  D/c TF.  Place central line and will initiate TPN.  CT A/P obtained today with signif ascites.  Plan IR vs washout in OR.  ID consulted and recommend washout.  Pt transitioned to Zosyn and started on isavu.  Will change PO meds to shane as much as able.      12/10:  Pt with signif improvement today in nausea and feels hungry.  CT A/P 12/9 with signif ascites.  Plan for IR para with drain placement in AM.  Continue Zosyn/Vanc/Isavu.  Will reorder TPN.  D/c shane in AM if not going to transition back to TF.    12/11:  D/c shane.  Continue TPN.  Nausea still improved.  Continue current abx.  Para today with 4.5 L removed.  HD cancelled.  Ascitic fluid labs pending.  Pt NPO after midnight.  Blood/urine cx 12/9 NGTD.    12/12:  Cell count negative from para yesterday.  Pt without nausea today- was able to eat a small amount.  Calorie count initiated.  Plan for IR drain placement tomorrow.  HD today with blood transfusion.    12/13:  Drain placed in IR this AM with WBC/diff negative for infection.  2.5 L total removed- replaced with albumin 25% 100 cc.  Will type and screen tomorrow and tx if < 8 in HD.  Remains slightly tachy/hypotensive but improved.  Eating minimally- reorder TPN.      12/14: no acute events over night.  Drain output decreased this am.  Reports eating better per patient and mother.  Continue current abx &  tpn.    Scheduled Meds:   sodium chloride 0.9%   Intravenous Once    atovaquone  1,500 mg Oral Daily    bisacodyl  10 mg Rectal Once    cycloSPORINE modified (NEORAL)  125 mg Oral BID    docusate sodium  100 mg Oral TID PC    fat emulsion 20%  250 mL Intravenous Daily    heparin (porcine)  5,000 Units Subcutaneous Q8H    isavuconazonium sulfate  372 mg Oral Daily    levETIRAcetam  500 mg Oral BID    levothyroxine  75 mcg Oral Before breakfast    pantoprazole  40 mg Oral Daily    piperacillin-tazobactam 4.5 g in dextrose 5 % 100 mL IVPB (ready to mix system)  4.5 g Intravenous Q12H    polyethylene glycol  17 g Oral BID    ursodiol  300 mg Oral BID     Continuous Infusions:   TPN ADULT CENTRAL LINE CUSTOM 50 mL/hr at 12/13/17 2154    TPN ADULT CENTRAL LINE CUSTOM       PRN Meds:sodium chloride 0.9%, acetaminophen, albumin human 25%, bisacodyl, heparin (porcine), midodrine, ondansetron, ondansetron, oxyCODONE, oxyCODONE, ramelteon, simethicone, sodium chloride 0.9%    Review of Systems   Constitutional: Positive for activity change, appetite change and fatigue. Negative for chills and fever.   Respiratory: Negative for cough, shortness of breath and wheezing.    Cardiovascular: Positive for leg swelling.   Gastrointestinal: Positive for abdominal distention, abdominal pain and constipation. Negative for nausea and vomiting.   Genitourinary: Negative for difficulty urinating and dysuria.   Skin: Positive for wound.   Allergic/Immunologic: Positive for immunocompromised state.   Neurological: Positive for weakness.   Psychiatric/Behavioral: Negative for confusion and decreased concentration. The patient is not nervous/anxious.      Objective:     Vital Signs (Most Recent):  Temp: 99.6 °F (37.6 °C) (12/14/17 1149)  Pulse: 107 (12/14/17 1149)  Resp: 18 (12/14/17 1149)  BP: (!) 100/56 (12/14/17 1149)  SpO2: 97 % (12/14/17 1213) Vital Signs (24h Range):  Temp:  [98.4 °F (36.9 °C)-100.4 °F (38 °C)] 99.6 °F  (37.6 °C)  Pulse:  [] 107  Resp:  [13-18] 18  SpO2:  [96 %-99 %] 97 %  BP: ()/(51-57) 100/56     Weight: 73.9 kg (162 lb 14.7 oz)  Body mass index is 26.3 kg/m².    Intake/Output - Last 3 Shifts       12/12 0700 - 12/13 0659 12/13 0700 - 12/14 0659 12/14 0700 - 12/15 0659    P.O. 240 940     I.V. (mL/kg)       Other 879      IV Piggyback  200     TPN 1855.9 973.8     Total Intake(mL/kg) 2974.9 (39.8) 2113.8 (28.6)     Urine (mL/kg/hr) 70 (0) 111 (0.1)     Emesis/NG output  0 (0)     Other 2900 (1.6) 3000 (1.7)     Stool  0 (0)     Blood  0 (0)     Total Output 2970 3111      Net +4.9 -997.2             Urine Occurrence 0 x 0 x     Stool Occurrence 1 x 1 x     Emesis Occurrence  0 x           Physical Exam   Constitutional: He is oriented to person, place, and time. He appears well-developed. No distress.   Temporal and distal extremity muscle wasting   HENT:   Head: Normocephalic and atraumatic.   Eyes: No scleral icterus.   Neck: Normal range of motion. Neck supple. No JVD present.   Cardiovascular: Regular rhythm and normal heart sounds.  Tachycardia present.    No murmur heard.  Pulmonary/Chest: Effort normal. No respiratory distress. He has decreased breath sounds in the right middle field, the right lower field and the left lower field. He has no wheezes. He exhibits no tenderness.   Abdominal: Soft. He exhibits distension. There is tenderness (diffuse). There is no rebound and no guarding.   Wound vac to incision   Musculoskeletal: Normal range of motion. He exhibits edema (2+ LE edema). He exhibits no tenderness.   Neurological: He is alert and oriented to person, place, and time. He has normal reflexes.   Skin: Skin is warm and dry. He is not diaphoretic.   Psychiatric: His behavior is normal. Judgment and thought content normal. His mood appears anxious.   Pt anxious and expressing feeling overwhelmed   Nursing note and vitals reviewed.      Laboratory:  Immunosuppressants         Stop Route  Frequency     cycloSPORINE modified capsule 125 mg      -- Oral 2 times daily        CBC:     Recent Labs  Lab 12/14/17  0500   WBC 2.95*   RBC 2.50*   HGB 7.9*   HCT 23.2*      MCV 93   MCH 31.6*   MCHC 34.1     CMP:     Recent Labs  Lab 12/14/17  0500   GLU 99   CALCIUM 9.0   ALBUMIN 2.0*   PROT 5.0*   *   K 4.4   CO2 28   CL 95   BUN 31*   CREATININE 4.7*   ALKPHOS 85   ALT <5*   AST 20   BILITOT 2.1*     Coagulation:     Recent Labs  Lab 12/11/17  0500   INR 1.1     Cardiac Markers: No results for input(s): CKMB, TROPONINT, MYOGLOBIN in the last 168 hours.  Labs within the past 24 hours have been reviewed.    Diagnostic Results:  I have personally reviewed all pertinent imaging studies.    Assessment/Plan:     * Fever    - See peritonitis  - Blood/urine cx repeated 12/9 NGTD          Peritonitis    - Diagnosed on previous admission.  Last para 11/29 with improving cell counts WBC 1400 with 45% PNMs   Was d/c'ed on augmentin, switched to vanc/cef at admit  - Repeat paracentesis 12/5/17, with  (improving from 1497)  - CT A/P repeated 12/9 with signif ascites  - Abx transitioned to Zosyn/Vanc and Isavu added 12/9 with signif clinical improvement  - Para 12/11 with 4.5 L removed - cell ct signif improved and negative for infection  - IR drain placed 1213 to drain remainder of ascites --> 2.5 L removed and replaced albumin 25% 100 cc x 2.  Cell count negative for infection  - Continue zosyn/vanc/isavu            LAURA (acute kidney injury)    -  Creatinine significantly elevated on 12/6 and HD resumed- was not on HD at admit as kidney function had improved.   - HD last 12/12.  - UOP has trended down daily and is now minimal.  - Cont strict I/O's.           Liver transplanted    - A/w fevers and hyperbilirubinemia.   - LFTs improving.  - PBS consulted. ERCP attempted on 12/4 but unable to cannulate. MRCP on 12/4 noted with stricture.  - ERCP 12/6/17  With post-anastamosis stricture, stent placed.   -  Para 12/5/17, 3L removed ( down from 1497, cr 5.2, bili 2.5)          Delayed surgical wound healing    - Wound vac in place, last changed 12/12 and healing well  - wound care following        Long-term use of immunosuppressant medication    - Continue cyclosporine  - Hold Cellcept for infection  - Will monitor for signs of toxic side effects, check daily Cyclosporine troughs, and change meds accordingly.        Prophylactic immunotherapy    - See long term immunosuppressant          At risk for opportunistic infections    - hold valcyte as wbc low, check cmv pcr (weekly surveillance)  - Continue Bactrim for PCP proph          Biliary stricture of transplanted liver    - Elevated t bili post liver txp in setting of abdominal pain, decreased appetitive and intermittent nausea.  PBS consulted to assess duct per ERCP.  - ERCP performed 12/5 PBS unable to cannulate. MRCP with stricture.  - ERCP 12/6/17 with post-anastamosis stricture with stent placed.   - Tbili peaked at 9.1, now trending down.   -started ursodiol 12/7/17.   - T bili has remained around 2 for last several days --> monitor closely.        Seizure    - Cont keppra          Depression    - Psychology consulted.          Severe malnutrition    - Pt malnourished with poor PO intake  - SHANE tube placed 12/8/17 and TF started  - Not tolerating TF --> central line placed and TPN started 12/9  - Remove shane 12/11        Hypothyroidism    -TSH 11.4  -T4 ,3.0  - Endo consulted. Start levothyroxine 75mcg PO. Instructions for administration given. Appreciate recs.           Anemia of chronic disease    - 1 unit PRBC with HD 12/8/17  - 1 u PRBC with HD 12/12  - Discussed Fe studies with nephrology- will continue PRN transfusions as with minimal Fe stores  - Monitor CBC daily            VTE Risk Mitigation         Ordered     heparin (porcine) injection 5,000 Units  Every 8 hours     Route:  Subcutaneous        12/11/17 1126     heparin (porcine) injection 1,000  Units  As needed (PRN)     Route:  Intra-Catheter        12/08/17 1736     Medium Risk of VTE  Once      12/03/17 0249     Place sequential compression device  Until discontinued      12/03/17 0249          The patients clinical status was discussed at multidisplinary rounds, involving transplant surgery, transplant medicine, pharmacy, nursing, nutrition, and social work    Discharge Planning: not candidate at this time  Monitor kidney function --> may need outpt HD  Monitor HH needs vs rehab for deconditioned status      Seda Camacho, NP  Liver Transplant  [facility]

## 2017-12-14 NOTE — SUBJECTIVE & OBJECTIVE
Interval History:   Patient reported nausea, no vomiting  Abdominal drain placed - immediately 2L fluid drained  Reports abdominal pain improving with decompression    Review of Systems   Constitutional: Negative for chills, diaphoresis and fever.   HENT: Negative for rhinorrhea and sore throat.    Respiratory: Negative for cough and shortness of breath.    Cardiovascular: Negative for chest pain and leg swelling.   Gastrointestinal: Positive for abdominal pain and nausea. Negative for diarrhea and vomiting.   Genitourinary: Negative for dysuria and hematuria.   Musculoskeletal: Negative for arthralgias and myalgias.   Skin: Negative for rash.   Neurological: Negative for headaches.   Psychiatric/Behavioral: The patient is nervous/anxious.      Objective:     Vital Signs (Most Recent):  Temp: 98.6 °F (37 °C) (12/13/17 1630)  Pulse: 102 (12/13/17 1630)  Resp: 18 (12/13/17 1630)  BP: (!) 100/57 (12/13/17 1630)  SpO2: 97 % (12/13/17 1630) Vital Signs (24h Range):  Temp:  [98.6 °F (37 °C)-100.1 °F (37.8 °C)] 98.6 °F (37 °C)  Pulse:  [100-119] 102  Resp:  [15-20] 18  SpO2:  [95 %-100 %] 97 %  BP: ()/(53-69) 100/57     Weight: 74.8 kg (164 lb 14.5 oz)  Body mass index is 26.62 kg/m².    Estimated Creatinine Clearance: 29.2 mL/min (based on SCr of 3.4 mg/dL (H)).    Physical Exam   Constitutional: He is oriented to person, place, and time. He appears well-developed and well-nourished. No distress.   HENT:   Head: Normocephalic and atraumatic.   Eyes: Conjunctivae and EOM are normal.   Neck: Normal range of motion. Neck supple.   Cardiovascular: Normal rate.    Pulmonary/Chest: Effort normal. No respiratory distress.   Abdominal: Soft. He exhibits distension. There is tenderness.   Drain with serous fluid   Musculoskeletal: Normal range of motion. He exhibits no edema.   Neurological: He is alert and oriented to person, place, and time.   Skin: Skin is warm and dry. No rash noted. He is not diaphoretic. No erythema.    Psychiatric: He has a normal mood and affect. His behavior is normal.   Vitals reviewed.      Significant Labs: All pertinent labs within the past 24 hours have been reviewed.    Significant Imaging: I have reviewed all pertinent imaging results/findings within the past 24 hours.

## 2017-12-14 NOTE — PROGRESS NOTES
[facility]  Nephrology  Progress Note    Patient Name: Jhonny Diana  MRN: [id number]  Admission Date: 12/3/2017  Hospital Length of Stay: 10 days  Attending Provider: Solis Chapa MD   Primary Care Physician: Primary Doctor No  Principal Problem:Fever    Subjective:     HPI: Mr. Diana is a 26yo man w/a history of alcoholic cirrhosis s/p DDLT 10/19/2017; c/b seizures (swtiched off prograf to cyclo), ATN requiring HD (last HD 11/17), superificial wound infection s/p wound vac to chevron incision, and recent admit for fevers on 11/21 (discharged on empiric augmentin for suspected superficial wound infection) and readmitted 11/24 again with fever. Found to have peritonitis (WBC 5000, 75% PNM) neg for bile leak. He was treated initially with vanc/cefepime. Repeat cell counts 11/29 with some improvement (WBC 1400, 45% PNM). Pt transitioned to augmentin upon d/c 11/30.      Pt presented to the ED this morning with subjective fever (100.5) and general malaise beginning at 8pm yesterday. His abdomen is soft, but diffusely tender. Labs reveal elevated WBC count to 12.7 from 6.5 in addition to elevated tbili 3.5 from 2.3 when compared to labs 3 days ago, despite being on augmentin for recent diagnosis of peritonitis. He denies nausea, vomiting, diarrhea, congestion, cough, CP or SOB. He has received 1 L bolus NS in the ED.    Interval History: NAEON, HD x 3 hrs with UF 2 lts. Net gain 4.9 ml/24hrs. Had percutaneous drain placed for asitics. C/o diffuse abdominal pain when he got back to his room post HD. H looks better and feels better post drain picture    Review of patient's allergies indicates:   Allergen Reactions    Bactrim [sulfamethoxazole-trimethoprim] Other (See Comments)     Mookie Trell Syndrome     Current Facility-Administered Medications   Medication Frequency    0.9%  NaCl infusion (for blood administration) Q24H PRN    0.9%  NaCl infusion PRN    acetaminophen tablet 650 mg Q8H PRN     albumin human 25% bottle 25 g PRN    atovaquone suspension 1,500 mg Daily    bisacodyl suppository 10 mg Daily PRN    bisacodyl suppository 10 mg Once    cycloSPORINE modified capsule 125 mg BID    docusate sodium capsule 100 mg TID PC    fat emulsion 20% infusion 250 mL Daily    heparin (porcine) injection 1,000 Units PRN    heparin (porcine) injection 5,000 Units Q8H    isavuconazonium sulfate Cap 372 mg Daily    levETIRAcetam tablet 500 mg BID    levothyroxine tablet 75 mcg Before breakfast    midodrine tablet 5 mg PRN    ondansetron disintegrating tablet 8 mg Q8H PRN    ondansetron injection 4 mg Q8H PRN    oxyCODONE immediate release tablet 10 mg Q3H PRN    oxyCODONE immediate release tablet 5 mg Q3H PRN    pantoprazole EC tablet 40 mg Daily    piperacillin-tazobactam 4.5 g in dextrose 5 % 100 mL IVPB (ready to mix system) Q12H    polyethylene glycol packet 17 g Daily    ramelteon tablet 8 mg Nightly PRN    simethicone chewable tablet 80 mg TID PRN    sodium chloride 0.9% flush 3 mL PRN    TPN ADULT CENTRAL LINE CUSTOM Continuous    TPN ADULT CENTRAL LINE CUSTOM Continuous    ursodiol capsule 300 mg BID    valGANciclovir tablet 450 mg Every Mon, Wed, Fri       Objective:     Vital Signs (Most Recent):  Temp: 98.6 °F (37 °C) (12/13/17 1630)  Pulse: 102 (12/13/17 1630)  Resp: 18 (12/13/17 1630)  BP: (!) 100/57 (12/13/17 1630)  SpO2: 97 % (12/13/17 1630)  O2 Device (Oxygen Therapy): room air (12/13/17 1630) Vital Signs (24h Range):  Temp:  [98.6 °F (37 °C)-100.1 °F (37.8 °C)] 98.6 °F (37 °C)  Pulse:  [100-119] 102  Resp:  [15-20] 18  SpO2:  [95 %-100 %] 97 %  BP: ()/(53-69) 100/57     Weight: 74.8 kg (164 lb 14.5 oz) (12/13/17 0530)  Body mass index is 26.62 kg/m².  Body surface area is 1.87 meters squared.    I/O last 3 completed shifts:  In: 2974.9 [P.O.:240; Other:879]  Out: 2970 [Urine:70; Other:2900]    Physical Exam   Constitutional: He is oriented to person, place, and time.  He has a sickly appearance. No distress. Nasal cannula in place.   HENT:   Head: Normocephalic and atraumatic.   Eyes: Pupils are equal, round, and reactive to light. Scleral icterus is present.   Neck: Normal range of motion. Neck supple. No JVD present.   Cardiovascular: Regular rhythm.  Tachycardia present.  Exam reveals no gallop and no friction rub.    No murmur heard.  Pulmonary/Chest: Effort normal. He has decreased breath sounds in the right lower field and the left lower field. He has no wheezes. He has no rales.   Abdominal: Soft. He exhibits distension (positive asitis ). There is no tenderness. There is no guarding.   Wound vac to incision. Positive ascitis new drain inlacdwithbag hooked to bedside   Musculoskeletal: He exhibits edema (2+ pitting edema in LE ).   Neurological: He is alert and oriented to person, place, and time.   Positive Asterixis, somnolent and nauseated   Skin: Skin is warm and dry. Capillary refill takes less than 2 seconds. He is not diaphoretic.   Psychiatric: His behavior is normal. He exhibits a depressed mood.   Nursing note and vitals reviewed.      Significant Labs:  ABGs: No results for input(s): PH, PCO2, HCO3, POCSATURATED, BE in the last 168 hours.  BMP:     Recent Labs  Lab 12/13/17  0430      CL 99   CO2 25   BUN 24*   CREATININE 3.4*   CALCIUM 8.6*   MG 2.1     CBC:     Recent Labs  Lab 12/13/17  0430   WBC 3.59*   RBC 2.64*   HGB 8.3*   HCT 24.4*   *   MCV 92   MCH 31.4*   MCHC 34.0     CMP:     Recent Labs  Lab 12/13/17  0430      CALCIUM 8.6*   ALBUMIN 1.9*   PROT 4.8*   *   K 4.0   CO2 25   CL 99   BUN 24*   CREATININE 3.4*   ALKPHOS 93   ALT <5*   AST 17   BILITOT 2.4*     All labs within the past 24 hours have been reviewed.     Significant Imaging:  Labs: Reviewed  X-Ray: Reviewed    Assessment/Plan:     No new Assessment & Plan notes have been filed under this hospital service since the last note was generated.  Service:  Nephrology      Thank you for your consult. I will follow-up with patient. Please contact us if you have any additional questions.    Krish Frausto MD  Nephrology  [facility]

## 2017-12-14 NOTE — SUBJECTIVE & OBJECTIVE
Interval History: NAEON, HD x 3 hrs with UF 2 lts. Net gain 4.9 ml/24hrs. Had percutaneous drain placed for asitics. C/o diffuse abdominal pain when he got back to his room post HD. H looks better and feels better post drain picture    Review of patient's allergies indicates:   Allergen Reactions    Bactrim [sulfamethoxazole-trimethoprim] Other (See Comments)     Mookie Trell Syndrome     Current Facility-Administered Medications   Medication Frequency    0.9%  NaCl infusion (for blood administration) Q24H PRN    0.9%  NaCl infusion PRN    acetaminophen tablet 650 mg Q8H PRN    albumin human 25% bottle 25 g PRN    atovaquone suspension 1,500 mg Daily    bisacodyl suppository 10 mg Daily PRN    bisacodyl suppository 10 mg Once    cycloSPORINE modified capsule 125 mg BID    docusate sodium capsule 100 mg TID PC    fat emulsion 20% infusion 250 mL Daily    heparin (porcine) injection 1,000 Units PRN    heparin (porcine) injection 5,000 Units Q8H    isavuconazonium sulfate Cap 372 mg Daily    levETIRAcetam tablet 500 mg BID    levothyroxine tablet 75 mcg Before breakfast    midodrine tablet 5 mg PRN    ondansetron disintegrating tablet 8 mg Q8H PRN    ondansetron injection 4 mg Q8H PRN    oxyCODONE immediate release tablet 10 mg Q3H PRN    oxyCODONE immediate release tablet 5 mg Q3H PRN    pantoprazole EC tablet 40 mg Daily    piperacillin-tazobactam 4.5 g in dextrose 5 % 100 mL IVPB (ready to mix system) Q12H    polyethylene glycol packet 17 g Daily    ramelteon tablet 8 mg Nightly PRN    simethicone chewable tablet 80 mg TID PRN    sodium chloride 0.9% flush 3 mL PRN    TPN ADULT CENTRAL LINE CUSTOM Continuous    TPN ADULT CENTRAL LINE CUSTOM Continuous    ursodiol capsule 300 mg BID    valGANciclovir tablet 450 mg Every Mon, Wed, Fri       Objective:     Vital Signs (Most Recent):  Temp: 98.6 °F (37 °C) (12/13/17 1630)  Pulse: 102 (12/13/17 1630)  Resp: 18 (12/13/17 1630)  BP: (!)  100/57 (12/13/17 1630)  SpO2: 97 % (12/13/17 1630)  O2 Device (Oxygen Therapy): room air (12/13/17 1630) Vital Signs (24h Range):  Temp:  [98.6 °F (37 °C)-100.1 °F (37.8 °C)] 98.6 °F (37 °C)  Pulse:  [100-119] 102  Resp:  [15-20] 18  SpO2:  [95 %-100 %] 97 %  BP: ()/(53-69) 100/57     Weight: 74.8 kg (164 lb 14.5 oz) (12/13/17 0530)  Body mass index is 26.62 kg/m².  Body surface area is 1.87 meters squared.    I/O last 3 completed shifts:  In: 2974.9 [P.O.:240; Other:879]  Out: 2970 [Urine:70; Other:2900]    Physical Exam   Constitutional: He is oriented to person, place, and time. He has a sickly appearance. No distress. Nasal cannula in place.   HENT:   Head: Normocephalic and atraumatic.   Eyes: Pupils are equal, round, and reactive to light. Scleral icterus is present.   Neck: Normal range of motion. Neck supple. No JVD present.   Cardiovascular: Regular rhythm.  Tachycardia present.  Exam reveals no gallop and no friction rub.    No murmur heard.  Pulmonary/Chest: Effort normal. He has decreased breath sounds in the right lower field and the left lower field. He has no wheezes. He has no rales.   Abdominal: Soft. He exhibits distension (positive asitis ). There is no tenderness. There is no guarding.   Wound vac to incision. Positive ascitis new drain inlacdwithbag hooked to bedside   Musculoskeletal: He exhibits edema (2+ pitting edema in LE ).   Neurological: He is alert and oriented to person, place, and time.   Positive Asterixis, somnolent and nauseated   Skin: Skin is warm and dry. Capillary refill takes less than 2 seconds. He is not diaphoretic.   Psychiatric: His behavior is normal. He exhibits a depressed mood.   Nursing note and vitals reviewed.      Significant Labs:  ABGs: No results for input(s): PH, PCO2, HCO3, POCSATURATED, BE in the last 168 hours.  BMP:     Recent Labs  Lab 12/13/17  0430      CL 99   CO2 25   BUN 24*   CREATININE 3.4*   CALCIUM 8.6*   MG 2.1     CBC:     Recent  Labs  Lab 12/13/17 0430   WBC 3.59*   RBC 2.64*   HGB 8.3*   HCT 24.4*   *   MCV 92   MCH 31.4*   MCHC 34.0     CMP:     Recent Labs  Lab 12/13/17 0430      CALCIUM 8.6*   ALBUMIN 1.9*   PROT 4.8*   *   K 4.0   CO2 25   CL 99   BUN 24*   CREATININE 3.4*   ALKPHOS 93   ALT <5*   AST 17   BILITOT 2.4*     All labs within the past 24 hours have been reviewed.     Significant Imaging:  Labs: Reviewed  X-Ray: Reviewed

## 2017-12-15 LAB
ABO + RH BLD: NORMAL
ALBUMIN SERPL BCP-MCNC: 2 G/DL
ALP SERPL-CCNC: 85 U/L
ALT SERPL W/O P-5'-P-CCNC: 5 U/L
ANION GAP SERPL CALC-SCNC: 10 MMOL/L
ANISOCYTOSIS BLD QL SMEAR: SLIGHT
AST SERPL-CCNC: 17 U/L
BACTERIA SPEC AEROBE CULT: NO GROWTH
BASOPHILS # BLD AUTO: ABNORMAL K/UL
BASOPHILS NFR BLD: 0 %
BILIRUB SERPL-MCNC: 2.1 MG/DL
BLD GP AB SCN CELLS X3 SERPL QL: NORMAL
BUN SERPL-MCNC: 17 MG/DL
CALCIUM SERPL-MCNC: 8.6 MG/DL
CHLORIDE SERPL-SCNC: 101 MMOL/L
CMV DNA SERPL NAA+PROBE-ACNC: NORMAL IU/ML
CO2 SERPL-SCNC: 23 MMOL/L
CREAT SERPL-MCNC: 3.3 MG/DL
CYCLOSPORINE BLD LC/MS/MS-MCNC: 258 NG/ML
DIFFERENTIAL METHOD: ABNORMAL
EOSINOPHIL # BLD AUTO: ABNORMAL K/UL
EOSINOPHIL NFR BLD: 1 %
ERYTHROCYTE [DISTWIDTH] IN BLOOD BY AUTOMATED COUNT: 16.1 %
EST. GFR  (AFRICAN AMERICAN): 27.8 ML/MIN/1.73 M^2
EST. GFR  (NON AFRICAN AMERICAN): 24.1 ML/MIN/1.73 M^2
GLUCOSE SERPL-MCNC: 88 MG/DL
HCT VFR BLD AUTO: 22.7 %
HCT VFR BLD AUTO: 22.8 %
HGB BLD-MCNC: 7.6 G/DL
HGB BLD-MCNC: 7.6 G/DL
IMM GRANULOCYTES # BLD AUTO: ABNORMAL K/UL
IMM GRANULOCYTES NFR BLD AUTO: ABNORMAL %
LYMPHOCYTES # BLD AUTO: ABNORMAL K/UL
LYMPHOCYTES NFR BLD: 18 %
MAGNESIUM SERPL-MCNC: 2 MG/DL
MCH RBC QN AUTO: 31.7 PG
MCHC RBC AUTO-ENTMCNC: 33.3 G/DL
MCV RBC AUTO: 95 FL
MONOCYTES # BLD AUTO: ABNORMAL K/UL
MONOCYTES NFR BLD: 23 %
NEUTROPHILS NFR BLD: 58 %
NRBC BLD-RTO: 0 /100 WBC
OVALOCYTES BLD QL SMEAR: ABNORMAL
PHOSPHATE SERPL-MCNC: 3.5 MG/DL
PLATELET # BLD AUTO: 234 K/UL
PLATELET BLD QL SMEAR: ABNORMAL
PMV BLD AUTO: 10.4 FL
POIKILOCYTOSIS BLD QL SMEAR: SLIGHT
POTASSIUM SERPL-SCNC: 4 MMOL/L
PROT SERPL-MCNC: 5.2 G/DL
RBC # BLD AUTO: 2.4 M/UL
SODIUM SERPL-SCNC: 134 MMOL/L
T4 FREE SERPL-MCNC: 0.84 NG/DL
TSH SERPL DL<=0.005 MIU/L-ACNC: 13.31 UIU/ML
VANCOMYCIN SERPL-MCNC: 9.5 UG/ML
WBC # BLD AUTO: 3.19 K/UL

## 2017-12-15 PROCEDURE — 97608 NEG PRS WND THER NDME>50SQCM: CPT

## 2017-12-15 PROCEDURE — 86920 COMPATIBILITY TEST SPIN: CPT

## 2017-12-15 PROCEDURE — 80053 COMPREHEN METABOLIC PANEL: CPT

## 2017-12-15 PROCEDURE — 63600175 PHARM REV CODE 636 W HCPCS: Performed by: PHYSICIAN ASSISTANT

## 2017-12-15 PROCEDURE — 99232 SBSQ HOSP IP/OBS MODERATE 35: CPT | Mod: ,,, | Performed by: INTERNAL MEDICINE

## 2017-12-15 PROCEDURE — 80202 ASSAY OF VANCOMYCIN: CPT

## 2017-12-15 PROCEDURE — 85007 BL SMEAR W/DIFF WBC COUNT: CPT

## 2017-12-15 PROCEDURE — 25000003 PHARM REV CODE 250: Performed by: STUDENT IN AN ORGANIZED HEALTH CARE EDUCATION/TRAINING PROGRAM

## 2017-12-15 PROCEDURE — 80158 DRUG ASSAY CYCLOSPORINE: CPT

## 2017-12-15 PROCEDURE — 99233 SBSQ HOSP IP/OBS HIGH 50: CPT | Mod: 24,,, | Performed by: NURSE PRACTITIONER

## 2017-12-15 PROCEDURE — 36415 COLL VENOUS BLD VENIPUNCTURE: CPT

## 2017-12-15 PROCEDURE — 86901 BLOOD TYPING SEROLOGIC RH(D): CPT

## 2017-12-15 PROCEDURE — 99900035 HC TECH TIME PER 15 MIN (STAT)

## 2017-12-15 PROCEDURE — 25000003 PHARM REV CODE 250: Performed by: PHYSICIAN ASSISTANT

## 2017-12-15 PROCEDURE — 97803 MED NUTRITION INDIV SUBSEQ: CPT | Performed by: DIETITIAN, REGISTERED

## 2017-12-15 PROCEDURE — 20600001 HC STEP DOWN PRIVATE ROOM

## 2017-12-15 PROCEDURE — 84443 ASSAY THYROID STIM HORMONE: CPT

## 2017-12-15 PROCEDURE — 85014 HEMATOCRIT: CPT

## 2017-12-15 PROCEDURE — 84100 ASSAY OF PHOSPHORUS: CPT

## 2017-12-15 PROCEDURE — 25000003 PHARM REV CODE 250: Performed by: NURSE PRACTITIONER

## 2017-12-15 PROCEDURE — 94664 DEMO&/EVAL PT USE INHALER: CPT

## 2017-12-15 PROCEDURE — 85027 COMPLETE CBC AUTOMATED: CPT

## 2017-12-15 PROCEDURE — 84439 ASSAY OF FREE THYROXINE: CPT

## 2017-12-15 PROCEDURE — 86900 BLOOD TYPING SEROLOGIC ABO: CPT

## 2017-12-15 PROCEDURE — 85018 HEMOGLOBIN: CPT

## 2017-12-15 PROCEDURE — 83735 ASSAY OF MAGNESIUM: CPT

## 2017-12-15 RX ORDER — SODIUM CHLORIDE 9 MG/ML
INJECTION, SOLUTION INTRAVENOUS ONCE
Status: COMPLETED | OUTPATIENT
Start: 2017-12-15 | End: 2017-12-16

## 2017-12-15 RX ORDER — CIPROFLOXACIN 500 MG/1
500 TABLET ORAL EVERY 24 HOURS
Status: DISCONTINUED | OUTPATIENT
Start: 2017-12-15 | End: 2017-12-18

## 2017-12-15 RX ORDER — SODIUM CHLORIDE 9 MG/ML
INJECTION, SOLUTION INTRAVENOUS
Status: DISCONTINUED | OUTPATIENT
Start: 2017-12-15 | End: 2017-12-16

## 2017-12-15 RX ORDER — METRONIDAZOLE 500 MG/1
500 TABLET ORAL EVERY 8 HOURS
Status: DISCONTINUED | OUTPATIENT
Start: 2017-12-15 | End: 2017-12-18

## 2017-12-15 RX ADMIN — LEVETIRACETAM 500 MG: 500 TABLET, FILM COATED ORAL at 08:12

## 2017-12-15 RX ADMIN — OXYCODONE HYDROCHLORIDE 10 MG: 5 TABLET ORAL at 04:12

## 2017-12-15 RX ADMIN — OXYCODONE HYDROCHLORIDE 10 MG: 5 TABLET ORAL at 05:12

## 2017-12-15 RX ADMIN — HEPARIN SODIUM 5000 UNITS: 5000 INJECTION, SOLUTION INTRAVENOUS; SUBCUTANEOUS at 09:12

## 2017-12-15 RX ADMIN — PIPERACILLIN SODIUM AND TAZOBACTAM SODIUM 4.5 G: 4; .5 INJECTION, POWDER, LYOPHILIZED, FOR SOLUTION INTRAVENOUS at 12:12

## 2017-12-15 RX ADMIN — CYCLOSPORINE 125 MG: 100 CAPSULE, LIQUID FILLED ORAL at 05:12

## 2017-12-15 RX ADMIN — ONDANSETRON 8 MG: 8 TABLET, ORALLY DISINTEGRATING ORAL at 08:12

## 2017-12-15 RX ADMIN — POLYETHYLENE GLYCOL 3350 17 G: 17 POWDER, FOR SOLUTION ORAL at 09:12

## 2017-12-15 RX ADMIN — URSODIOL 300 MG: 300 CAPSULE ORAL at 08:12

## 2017-12-15 RX ADMIN — DOCUSATE SODIUM 100 MG: 100 CAPSULE, LIQUID FILLED ORAL at 02:12

## 2017-12-15 RX ADMIN — CIPROFLOXACIN HYDROCHLORIDE 500 MG: 500 TABLET, FILM COATED ORAL at 11:12

## 2017-12-15 RX ADMIN — HEPARIN SODIUM 5000 UNITS: 5000 INJECTION, SOLUTION INTRAVENOUS; SUBCUTANEOUS at 02:12

## 2017-12-15 RX ADMIN — LEVETIRACETAM 500 MG: 500 TABLET, FILM COATED ORAL at 09:12

## 2017-12-15 RX ADMIN — METRONIDAZOLE 500 MG: 500 TABLET ORAL at 02:12

## 2017-12-15 RX ADMIN — OXYCODONE HYDROCHLORIDE 10 MG: 5 TABLET ORAL at 11:12

## 2017-12-15 RX ADMIN — OXYCODONE HYDROCHLORIDE 10 MG: 5 TABLET ORAL at 12:12

## 2017-12-15 RX ADMIN — OXYCODONE HYDROCHLORIDE 10 MG: 5 TABLET ORAL at 02:12

## 2017-12-15 RX ADMIN — ATOVAQUONE 1500 MG: 750 SUSPENSION ORAL at 08:12

## 2017-12-15 RX ADMIN — DOCUSATE SODIUM 100 MG: 100 CAPSULE, LIQUID FILLED ORAL at 08:12

## 2017-12-15 RX ADMIN — PANTOPRAZOLE SODIUM 40 MG: 40 TABLET, DELAYED RELEASE ORAL at 08:12

## 2017-12-15 RX ADMIN — OXYCODONE HYDROCHLORIDE 10 MG: 5 TABLET ORAL at 09:12

## 2017-12-15 RX ADMIN — ISAVUCONAZONIUM SULFATE 372 MG: 186 CAPSULE ORAL at 08:12

## 2017-12-15 RX ADMIN — METRONIDAZOLE 500 MG: 500 TABLET ORAL at 09:12

## 2017-12-15 RX ADMIN — OXYCODONE HYDROCHLORIDE 10 MG: 5 TABLET ORAL at 07:12

## 2017-12-15 RX ADMIN — POLYETHYLENE GLYCOL 3350 17 G: 17 POWDER, FOR SOLUTION ORAL at 08:12

## 2017-12-15 RX ADMIN — URSODIOL 300 MG: 300 CAPSULE ORAL at 09:12

## 2017-12-15 RX ADMIN — LEVOTHYROXINE SODIUM 75 MCG: 50 TABLET ORAL at 04:12

## 2017-12-15 RX ADMIN — CYCLOSPORINE 125 MG: 100 CAPSULE, LIQUID FILLED ORAL at 08:12

## 2017-12-15 RX ADMIN — HEPARIN SODIUM 5000 UNITS: 5000 INJECTION, SOLUTION INTRAVENOUS; SUBCUTANEOUS at 06:12

## 2017-12-15 RX ADMIN — DOCUSATE SODIUM 100 MG: 100 CAPSULE, LIQUID FILLED ORAL at 09:12

## 2017-12-15 NOTE — ASSESSMENT & PLAN NOTE
- Diagnosed on previous admission.  Last para 11/29 with improving cell counts WBC 1400 with 45% PNMs   Was d/c'ed on augmentin, switched to vanc/cef at admit  - Repeat paracentesis 12/5/17, with  (improving from 1497)  - CT A/P repeated 12/9 with signif ascites  - Abx transitioned to Zosyn/Vanc and Isavu added 12/9 with signif clinical improvement  - Para 12/11 with 4.5 L removed - cell ct signif improved and negative for infection  - IR drain placed 1213 to drain remainder of ascites --> 2.5 L removed and replaced albumin 25% 100 cc x 2.  Cell count negative for infection  - discontinue zosyn/vanc/isavu, transition to oral cipro/flagyl

## 2017-12-15 NOTE — PROGRESS NOTES
[facility]  Adult Nutrition  Progress Note    SUMMARY     Recommendations    Recommendation/Intervention:   Pt continuous to complain of abdominal distention preventing oral intake.     Recommend continuing TPN until severe abdominal distention resolves and pt is able to eat 50% of meals. At this time, recommend weaning TPN to 50% of total needs - 40g AA / 100 g dex plus IVLE every other day. Calorie count ongoing - currently eating ~ 25% of total needs.     RD following.       Goals: Transition to oral diet meeting >85% EEN and EPN  Nutrition Goal Status: progressing towards goal  Communication of RD Recs: discussed on rounds    Reason for Assessment    Reason for Assessment: RD follow-up  Diagnosis: transplant/postoperative complications (peritonitis)  Relevent Medical History: OLTx 10/19/2017, complicated post-op w/ seizures and LAURA, last HD 11/17, cirrhosis 2' EtOH   Interdisciplinary Rounds: attended     General Information Comments: Pt now tolerating TPN at goal rate, on regular diet, eating small bits of food - continues to be picky about foods, c/o timing also maknig eating difficult with HD and procedures, abdominal pressure the main thing preventing adequate oral intake    Nutrition Discharge Planning: Adequate PO intake    Nutrition Prescription Ordered    Current Diet Order: Regular  Nutrition Order Comments: On and off Renal/NPO  Current Nutrition Support Formula Ordered:  (Custom TPN 72g AA/356g dex)  Current Nutrition Support Rate Ordered: 50 (ml)  Current Nutrition Support Frequency Ordered: mL/hr  Oral Nutrition Supplement: Beneprotein     Evaluation of Received Nutrients/Fluid Intake    Enteral Calories (kcal): 0  Enteral Protein (gm): 0  Enteral (Free Water) Fluid (mL): 0     Parenteral Calories (kcal): 1498     Parenteral Fluid (mL): 1080     Total Calories (kcal): 1998     Energy Calories Required: meeting needs  % Kcal Needs: 93      Protein Required: not meeting needs  %  "Protein Needs: 76      GIR (Glucose Infusion Rate) (mg/kg/min): 3.35 mg/kg/min  Lipid Calories (kcals): 500 kcals  I/O: +1.5L since admit      Fluid Required: meeting needs (per MD)  Comments: LBM 12/10  Tolerance: not tolerating  % Intake of Estimated Energy Needs: 75 - 100 %  % Meal Intake: 25%     Nutrition Risk Screen     Nutrition Risk Screen: large or nonhealing wound, burn or pressure ulcer    Nutrition/Diet History    Patient Reported Diet/Restrictions/Preferences: renal  Typical Food/Fluid Intake: Pt eats 2 meals per day, at eats light at those times due to abdominal fullness  Food Preferences: No cultural or Rastafari food preferences noted     Factors Affecting Nutritional Intake: abdominal distention, nausea/vomiting, NPO, early satiety     Labs/Tests/Procedures/Meds    Diagnostic Test/Procedure Review: reviewed  Pertinent Labs Reviewed: reviewed  Pertinent Labs Comments: Na 134, Cr 3.3, gfr 24.1, TBili 2.1, ALT 5  Pertinent Medications Reviewed: reviewed  Pertinent Medications Comments: bisacodyl, docuate, pantoprazole    Physical Findings    Overall Physical Appearance: generalized wasting, loss of subcutaneous fat, loss of muscle mass, edematous, weak  Tubes:  (-)  Oral/Mouth Cavity: WDL  Skin: incision    Anthropometrics    Temp: 99.9 °F (37.7 °C)     Height: 5' 6" (167.6 cm)  Weight Method: Bed Scale  Weight: 73.4 kg (161 lb 13.1 oz)  Ideal Body Weight (IBW), Male: 142 lb     % Ideal Body Weight, Male (lb): 124.67 lb     BMI (Calculated): 28.6  BMI Grade: 25 - 29.9 - overweight     Usual Body Weight (UBW), kg:  (fluctuates 2' fluid shifts)      Estimated/Assessed Needs    Weight Used For Calorie Calculations: 81.1 kg (178 lb 12.7 oz)   Height (cm): 167.6 cm  Energy Calorie Requirements (kcal): 2155  Energy Need Method: [place]-St Jeor (x1.25 PAL)      RMR ([place]-St. Jeor Equation): 1723.75      Weight Used For Protein Calculations: 81.1 kg (178 lb 12.7 oz)  Protein Requirements: 97-114g (1.2-1.4 " g/kg)       Fluid Need Method: RDA Method        RDA Method (mL): 2155               Assessment and Plan    Severe malnutrition    Nutrition Diagnosis:  Inadequate energy intake    Related to (etiology):   Poor appetite 2' ascites and abdominal distention, poor food choices    Signs and Symptoms (as evidenced by):   Pt consuming ~0-25% EEN and EPN, TPN meeting 100% EEN and 73% EPN    Interventions/Recommendations (treatment strategy):  See recs    Nutrition Diagnosis Status:   Continues              Monitor and Evaluation    Food and Nutrient Intake: energy intake, food and beverage intake  Food and Nutrient Adminstration: diet order  Knowledge/Beliefs/Attitudes: food and nutrition knowledge/skill     Anthropometric Measurements: weight, weight change, body mass index  Biochemical Data, Medical Tests and Procedures: electrolyte and renal panel, gastrointestinal profile, glucose/endocrine profile, inflammatory profile, lipid profile  Nutrition-Focused Physical Findings: overall appearance    Nutrition Risk    Level of Risk:  (2x/week)    Nutrition Follow-Up    RD Follow-up?: Yes

## 2017-12-15 NOTE — ASSESSMENT & PLAN NOTE
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 He was admitted for peritonitis from 11/24-11/30 for peritonitis.     Plan:   - LAURA on CKD 4 (baseline sCr 3.9 - 4.0). Likely instrinsic etiology, multifactorial secondary to sepsis vs. Toxins   - HD x 3.5 hrs  Today for metabolic clearance and volume management   - Target 1-2 lt as tolerated keep MAP > 65  - would recommend to maintain Hg > 8 for improved chance on renal recovery.  - Avoid nephrotoxic medication, renal dose medications and avoid contrast   - Renal diet  - EPO with HD

## 2017-12-15 NOTE — PLAN OF CARE
"Problem: Patient Care Overview  Goal: Plan of Care Review  Outcome: Ongoing (interventions implemented as appropriate)  AAOx3, afebrile, pain better controlled this evening. HD yesterday. Tolerated well. Wound vac in place, d/t be changed today. TPN/Lipids infusing per orderPt in lowest postion, side rails up x2, non skid footwear in place, call light within reach, pt verbalized understanding to call the nurse when needed. Pt stated he is "feeling good and plans to eat today because he wants to go home." Pt able to reposition self independently. Will continue to monitor.             "

## 2017-12-15 NOTE — PROGRESS NOTES
[facility]  Liver Transplant  Progress Note    Patient Name: Jhonny Diana  MRN: [id number]  Admission Date: 12/3/2017  Hospital Length of Stay: 12 days  Code Status: Full Code  Primary Care Provider: Primary Doctor No  Post-Operative Day: 57    ORGAN:   LIVER  Disease Etiology: Acute Alcoholic Hepatitis  Donor Type:    - Brain Death  CDC High Risk:   No  Donor CMV Status:   Donor CMV Status: Positive  Donor HBcAB:   Negative  Donor HCV Status:   Negative  Whole or Partial: Whole Liver  Biliary Anastomosis: End to End  Arterial Anatomy: Standard  Subjective:     History of Present Illness:  Mr. Diana is a 28yo man w/a history of alcoholic cirrhosis s/p DDLT 10/19/2017; c/b seizures (swtiched off prograf to cyclo), ATN requiring HD (last HD ), superificial wound infection s/p wound vac to chevron incision, and recent admit for fevers on  (discharged on empiric augmentin for suspected superficial wound infection) and readmitted  again with fever. Found to have peritonitis (WBC 5000, 75% PNM) neg for bile leak. He was treated initially with vanc/cefepime. Repeat cell counts  with some improvement (WBC 1400, 45% PNM). Pt transitioned to augmentin upon d/c .      Pt presented to the ED 12/3 morning with subjective fever (100.5) and general malaise beginning at 8pm day prior. His abdomen soft, but diffusely tender. Labs reveal elevated WBC count to 12.7 from 6.5 in addition to elevated tbili 3.5 from 2.3 when compared to labs 3 days ago, despite being on augmentin for recent diagnosis of peritonitis. He denied nausea, vomiting, diarrhea, congestion, cough, CP or SOB. He received 1 L bolus NS in the ED. Infectious mosley intiated, BS abx (vanc/cef) started, and obtained CT abdomen.     He has a wound vac in place.     Hospital Course:  Interval History:      : Pt in slightly better spirits today, joking with team. Pt remains with abdominal pain which is improving as  peritonitis continues to resolve (last para ). Bili trending down at 5.2 today, cont ursodiol. PO intake remains poor, will place SHANE tube and initiate tube feeds today, orders placed. Will give albumin x1. Per nephrology will plan for HD today. H/H low on morning labs will plan for 1 unit PRBC with HD. Of note, TSH checked yesterday, level of 11.4 with T4 <3.0, endo consulted- will start levothyroxine today. Cont to encourage PO intake and ambulation. PT ordered 12/7/17, unable to see patient today - will work with pt this weekend. Monitor.     12/9:  Pt with signif nausea and abd pain this AM.  D/c TF.  Place central line and will initiate TPN.  CT A/P obtained today with signif ascites.  Plan IR vs washout in OR.  ID consulted and recommend washout.  Pt transitioned to Zosyn and started on isavu.  Will change PO meds to shane as much as able.      12/10:  Pt with signif improvement today in nausea and feels hungry.  CT A/P 12/9 with signif ascites.  Plan for IR para with drain placement in AM.  Continue Zosyn/Vanc/Isavu.  Will reorder TPN.  D/c shane in AM if not going to transition back to TF.    12/11:  D/c shane.  Continue TPN.  Nausea still improved.  Continue current abx.  Para today with 4.5 L removed.  HD cancelled.  Ascitic fluid labs pending.  Pt NPO after midnight.  Blood/urine cx 12/9 NGTD.    12/12:  Cell count negative from para yesterday.  Pt without nausea today- was able to eat a small amount.  Calorie count initiated.  Plan for IR drain placement tomorrow.  HD today with blood transfusion.    12/13:  Drain placed in IR this AM with WBC/diff negative for infection.  2.5 L total removed- replaced with albumin 25% 100 cc.  Will type and screen tomorrow and tx if < 8 in HD.  Remains slightly tachy/hypotensive but improved.  Eating minimally- reorder TPN.      12/14: no acute events over night.  Drain output decreased this am.  Reports eating better per patient and mother.  Continue current abx &  tpn.    12/15: HD 12/14.  Wound vac dressing changed today.  Continue to encourage oral intake.  Stop TPN.  Transition to oral antibiotics today per ID recs. Likely will need transfusion with HD in am.    Scheduled Meds:   atovaquone  1,500 mg Oral Daily    bisacodyl  10 mg Rectal Once    ciprofloxacin HCl  500 mg Oral Daily    cycloSPORINE modified (NEORAL)  125 mg Oral BID    docusate sodium  100 mg Oral TID PC    heparin (porcine)  5,000 Units Subcutaneous Q8H    isavuconazonium sulfate  372 mg Oral Daily    levETIRAcetam  500 mg Oral BID    levothyroxine  75 mcg Oral Before breakfast    metroNIDAZOLE  500 mg Oral Q8H    pantoprazole  40 mg Oral Daily    polyethylene glycol  17 g Oral BID    ursodiol  300 mg Oral BID     Continuous Infusions:    PRN Meds:sodium chloride 0.9%, acetaminophen, albumin human 25%, bisacodyl, heparin (porcine), midodrine, ondansetron, ondansetron, oxyCODONE, oxyCODONE, ramelteon, simethicone, sodium chloride 0.9%    Review of Systems   Constitutional: Positive for activity change, appetite change and fatigue. Negative for chills and fever.   Respiratory: Negative for cough, shortness of breath and wheezing.    Cardiovascular: Positive for leg swelling.   Gastrointestinal: Positive for abdominal distention, abdominal pain and constipation. Negative for nausea and vomiting.   Genitourinary: Negative for difficulty urinating and dysuria.   Skin: Positive for wound.   Allergic/Immunologic: Positive for immunocompromised state.   Neurological: Positive for weakness.   Psychiatric/Behavioral: Negative for confusion and decreased concentration. The patient is not nervous/anxious.      Objective:     Vital Signs (Most Recent):  Temp: 100.3 °F (37.9 °C) (12/15/17 1119)  Pulse: 102 (12/15/17 1119)  Resp: 18 (12/15/17 1119)  BP: 106/60 (12/15/17 1119)  SpO2: 98 % (12/15/17 1119) Vital Signs (24h Range):  Temp:  [98.5 °F (36.9 °C)-100.3 °F (37.9 °C)] 100.3 °F (37.9 °C)  Pulse:   [] 102  Resp:  [16-18] 18  SpO2:  [95 %-100 %] 98 %  BP: ()/(55-71) 106/60     Weight: 73.4 kg (161 lb 13.1 oz)  Body mass index is 26.12 kg/m².    Intake/Output - Last 3 Shifts       12/13 0700 - 12/14 0659 12/14 0700 - 12/15 0659 12/15 0700 - 12/16 0659    P.O. 940 900     I.V. (mL/kg)  0 (0)     Other  700     IV Piggyback 200 200     .8 122.9 705.2    Total Intake(mL/kg) 2113.8 (28.6) 1922.9 (26.2) 705.2 (9.6)    Urine (mL/kg/hr) 111 (0.1) 40 (0)     Emesis/NG output 0 (0) 0 (0)     Other 3000 (1.7) 2765 (1.6)     Stool 0 (0) 0 (0)     Blood 0 (0) 0 (0)     Total Output 3111 2805      Net -997.2 -882.1 +705.2           Urine Occurrence 0 x 0 x     Stool Occurrence 1 x 0 x     Emesis Occurrence 0 x 0 x           Physical Exam   Constitutional: He is oriented to person, place, and time. He appears well-developed. No distress.   Temporal and distal extremity muscle wasting   HENT:   Head: Normocephalic and atraumatic.   Eyes: No scleral icterus.   Neck: Normal range of motion. Neck supple. No JVD present.   Cardiovascular: Normal rate, regular rhythm and normal heart sounds.    No murmur heard.  Pulmonary/Chest: Effort normal. No respiratory distress. He has decreased breath sounds in the right middle field, the right lower field and the left lower field. He has no wheezes. He exhibits no tenderness.   Abdominal: Soft. He exhibits distension. There is tenderness (diffuse). There is no rebound and no guarding.   Wound vac to incision   Musculoskeletal: Normal range of motion. He exhibits edema (2+ LE edema). He exhibits no tenderness.   Neurological: He is alert and oriented to person, place, and time. He has normal reflexes.   Skin: Skin is warm and dry. He is not diaphoretic.   Psychiatric: His behavior is normal. Judgment and thought content normal. His mood appears anxious.   Pt anxious and expressing feeling overwhelmed   Nursing note and vitals reviewed.      Laboratory:  Immunosuppressants          Stop Route Frequency     cycloSPORINE modified capsule 125 mg      -- Oral 2 times daily        CBC:     Recent Labs  Lab 12/15/17  0400 12/15/17  0745   WBC 3.19*  --    RBC 2.40*  --    HGB 7.6* 7.6*   HCT 22.8* 22.7*     --    MCV 95  --    MCH 31.7*  --    MCHC 33.3  --      CMP:     Recent Labs  Lab 12/15/17  0400   GLU 88   CALCIUM 8.6*   ALBUMIN 2.0*   PROT 5.2*   *   K 4.0   CO2 23      BUN 17   CREATININE 3.3*   ALKPHOS 85   ALT 5*   AST 17   BILITOT 2.1*     Coagulation:     Recent Labs  Lab 12/11/17  0500   INR 1.1     Cardiac Markers: No results for input(s): CKMB, TROPONINT, MYOGLOBIN in the last 168 hours.  Labs within the past 24 hours have been reviewed.    Diagnostic Results:  I have personally reviewed all pertinent imaging studies.    Assessment/Plan:     * Fever    - See peritonitis  - Blood/urine cx repeated 12/9 NGTD          Peritonitis    - Diagnosed on previous admission.  Last para 11/29 with improving cell counts WBC 1400 with 45% PNMs   Was d/c'ed on augmentin, switched to vanc/cef at admit  - Repeat paracentesis 12/5/17, with  (improving from 1497)  - CT A/P repeated 12/9 with signif ascites  - Abx transitioned to Zosyn/Vanc and Isavu added 12/9 with signif clinical improvement  - Para 12/11 with 4.5 L removed - cell ct signif improved and negative for infection  - IR drain placed 1213 to drain remainder of ascites --> 2.5 L removed and replaced albumin 25% 100 cc x 2.  Cell count negative for infection  - discontinue zosyn/vanc/isavu, transition to oral cipro/flagyl            LAURA (acute kidney injury)    -  Creatinine significantly elevated on 12/6 and HD resumed- was not on HD at admit as kidney function had improved.   - HD last 12/12.  - UOP has trended down daily and is now minimal.  - Cont strict I/O's.           Liver transplanted    - A/w fevers and hyperbilirubinemia.   - LFTs improving.  - PBS consulted. ERCP attempted on 12/4 but unable to  cannulate. MRCP on 12/4 noted with stricture.  - ERCP 12/6/17  With post-anastamosis stricture, stent placed.   - Para 12/5/17, 3L removed ( down from 1497, cr 5.2, bili 2.5)          Delayed surgical wound healing    - Wound vac in place, last changed 12/15 and healing well  - wound care following        Long-term use of immunosuppressant medication    - Continue cyclosporine  - Hold Cellcept for infection  - Will monitor for signs of toxic side effects, check daily Cyclosporine troughs, and change meds accordingly.        Prophylactic immunotherapy    - See long term immunosuppressant          At risk for opportunistic infections    - hold valcyte as wbc low, check cmv pcr (weekly surveillance)  - Continue Bactrim for PCP proph          Biliary stricture of transplanted liver    - Elevated t bili post liver txp in setting of abdominal pain, decreased appetitive and intermittent nausea.  PBS consulted to assess duct per ERCP.  - ERCP performed 12/5 PBS unable to cannulate. MRCP with stricture.  - ERCP 12/6/17 with post-anastamosis stricture with stent placed.   - Tbili peaked at 9.1, now trending down.   -started ursodiol 12/7/17.   - T bili has remained around 2 for last several days --> monitor closely.        Seizure    - Cont keppra          Depression    - Psychology consulted.          Severe malnutrition    - Pt malnourished with poor PO intake  - SHANE tube placed 12/8/17 and TF started  - Not tolerating TF --> central line placed and TPN started 12/9  - Remove shane 12/11        Hypothyroidism    -TSH 11.4  -T4 ,3.0  - Endo consulted. Start levothyroxine 75mcg PO. Instructions for administration given. Appreciate recs.           Anemia of chronic disease    - 1 unit PRBC with HD 12/8/17  - 1 u PRBC with HD 12/12  - Discussed Fe studies with nephrology- will continue PRN transfusions as with minimal Fe stores  - Monitor CBC daily            VTE Risk Mitigation         Ordered     heparin (porcine)  injection 5,000 Units  Every 8 hours     Route:  Subcutaneous        12/11/17 1126     heparin (porcine) injection 1,000 Units  As needed (PRN)     Route:  Intra-Catheter        12/08/17 1736     Medium Risk of VTE  Once      12/03/17 0249     Place sequential compression device  Until discontinued      12/03/17 0249          The patients clinical status was discussed at multidisplinary rounds, involving transplant surgery, transplant medicine, pharmacy, nursing, nutrition, and social work    Discharge Planning: not candidate at this time  Monitor kidney function --> may need outpt HD  Monitor HH needs vs rehab for deconditioned status      Seda Camacho, NP  Liver Transplant  [facility]

## 2017-12-15 NOTE — SUBJECTIVE & OBJECTIVE
"Interval HPI:   Overnight events: NAEON.     BP (!) 113/56 (BP Location: Left arm, Patient Position: Lying)   Pulse 101   Temp 99.9 °F (37.7 °C) (Oral)   Resp 18   Ht 5' 6" (1.676 m)   Wt 73.4 kg (161 lb 13.1 oz)   SpO2 98%   BMI 26.12 kg/m²     Labs Reviewed and Include      Recent Labs  Lab 12/15/17  0400   GLU 88   CALCIUM 8.6*   ALBUMIN 2.0*   PROT 5.2*   *   K 4.0   CO2 23      BUN 17   CREATININE 3.3*   ALKPHOS 85   ALT 5*   AST 17   BILITOT 2.1*     Lab Results   Component Value Date    WBC 3.19 (L) 12/15/2017    HGB 7.6 (L) 12/15/2017    HCT 22.7 (L) 12/15/2017    MCV 95 12/15/2017     12/15/2017       Recent Labs  Lab 12/11/17  0500 12/15/17  0400   TSH 17.512* 13.308*   FREET4 0.66* 0.84     Lab Results   Component Value Date    HGBA1C <4.0 (A) 10/18/2017       Nutritional status:   Body mass index is 26.12 kg/m².  Lab Results   Component Value Date    ALBUMIN 2.0 (L) 12/15/2017    ALBUMIN 2.0 (L) 12/14/2017    ALBUMIN 1.9 (L) 12/13/2017     Lab Results   Component Value Date    PREALBUMIN 5 (L) 10/11/2017       Estimated Creatinine Clearance: 30.1 mL/min (based on SCr of 3.3 mg/dL (H)).    Accu-Checks  No results for input(s): POCTGLUCOSE in the last 72 hours.    Current Medications and/or Treatments Impacting Glycemic Control  Immunotherapy:  Immunosuppressants         Stop Route Frequency     cycloSPORINE modified capsule 125 mg      -- Oral 2 times daily        Steroids:   Hormones     None        Pressors:    Autonomic Drugs     Start     Stop Route Frequency Ordered    12/06/17 1850  midodrine tablet 5 mg      -- Oral Use PRN 12/06/17 1751        Hyperglycemia/Diabetes Medications: Antihyperglycemics     None        "

## 2017-12-15 NOTE — ASSESSMENT & PLAN NOTE
- DDx includes primary hypothyroidism vs non-thyroidal illness  - On initial presentation TSH 11.467; T4 Total <3.0; Free T4 0.53  - Repeat TFTs on 12/11 significant for TSH 17.512, T4, Free 0.66 following 3 days of treatment with IV levothyroxine.  - Today, TSH improved to 13.3, T4 0.84  - Ordered Reverse T3 26.7 at the upper limit of normal, pointing more towards non-thyroidal illness.   - TPO antibody negative  - Continue Levothyroxine 75 mcg QD PO to be given 1 hour prior to breakfast/ AM tube feed  - Will titrate dose based on lab trends

## 2017-12-15 NOTE — PROGRESS NOTES
[facility]  Nephrology  Progress Note    Patient Name: Jhonny Diana  MRN: [id number]  Admission Date: 12/3/2017  Hospital Length of Stay: 11 days  Attending Provider: Solis Chapa MD   Primary Care Physician: Primary Doctor No  Principal Problem:Fever    Subjective:     HPI: Mr. Diana is a 26yo man w/a history of alcoholic cirrhosis s/p DDLT 10/19/2017; c/b seizures (swtiched off prograf to cyclo), ATN requiring HD (last HD 11/17), superificial wound infection s/p wound vac to chevron incision, and recent admit for fevers on 11/21 (discharged on empiric augmentin for suspected superficial wound infection) and readmitted 11/24 again with fever. Found to have peritonitis (WBC 5000, 75% PNM) neg for bile leak. He was treated initially with vanc/cefepime. Repeat cell counts 11/29 with some improvement (WBC 1400, 45% PNM). Pt transitioned to augmentin upon d/c 11/30.      Pt presented to the ED this morning with subjective fever (100.5) and general malaise beginning at 8pm yesterday. His abdomen is soft, but diffusely tender. Labs reveal elevated WBC count to 12.7 from 6.5 in addition to elevated tbili 3.5 from 2.3 when compared to labs 3 days ago, despite being on augmentin for recent diagnosis of peritonitis. He denies nausea, vomiting, diarrhea, congestion, cough, CP or SOB. He has received 1 L bolus NS in the ED.    Interval History: NAEON. Peritoneal drain with ~ 2 lts,  Net neg 997 ml/24hrs. Diffuse abdominal pain better today he looks more comfortable. Seen while on HD with out complications tolerating well. TPN bag infusing K bath adjusted.    Review of patient's allergies indicates:   Allergen Reactions    Bactrim [sulfamethoxazole-trimethoprim] Other (See Comments)     Mookie Trell Syndrome     Current Facility-Administered Medications   Medication Frequency    0.9%  NaCl infusion PRN    acetaminophen tablet 650 mg Q8H PRN    albumin human 25% bottle 25 g PRN    atovaquone suspension  1,500 mg Daily    bisacodyl suppository 10 mg Daily PRN    bisacodyl suppository 10 mg Once    cycloSPORINE modified capsule 125 mg BID    docusate sodium capsule 100 mg TID PC    fat emulsion 20% infusion 250 mL Daily    heparin (porcine) injection 1,000 Units PRN    heparin (porcine) injection 5,000 Units Q8H    isavuconazonium sulfate Cap 372 mg Daily    levETIRAcetam tablet 500 mg BID    levothyroxine tablet 75 mcg Before breakfast    midodrine tablet 5 mg PRN    ondansetron disintegrating tablet 8 mg Q8H PRN    ondansetron injection 4 mg Q8H PRN    oxyCODONE immediate release tablet 10 mg Q3H PRN    oxyCODONE immediate release tablet 5 mg Q3H PRN    pantoprazole EC tablet 40 mg Daily    piperacillin-tazobactam 4.5 g in dextrose 5 % 100 mL IVPB (ready to mix system) Q12H    polyethylene glycol packet 17 g BID    ramelteon tablet 8 mg Nightly PRN    simethicone chewable tablet 80 mg TID PRN    sodium chloride 0.9% flush 3 mL PRN    TPN ADULT CENTRAL LINE CUSTOM Continuous    TPN ADULT CENTRAL LINE CUSTOM Continuous    ursodiol capsule 300 mg BID       Objective:     Vital Signs (Most Recent):  Temp: 98.5 °F (36.9 °C) (12/14/17 1830)  Pulse: 95 (12/14/17 1900)  Resp: 18 (12/14/17 1830)  BP: 121/65 (12/14/17 1830)  SpO2: 97 % (12/14/17 1657)  O2 Device (Oxygen Therapy): room air (12/14/17 1830) Vital Signs (24h Range):  Temp:  [98.5 °F (36.9 °C)-100.4 °F (38 °C)] 98.5 °F (36.9 °C)  Pulse:  [] 95  Resp:  [13-18] 18  SpO2:  [96 %-99 %] 97 %  BP: ()/(51-71) 121/65     Weight: 73.9 kg (162 lb 14.7 oz) (12/14/17 0600)  Body mass index is 26.3 kg/m².  Body surface area is 1.86 meters squared.    I/O last 3 completed shifts:  In: 3213.8 [P.O.:1240; Other:700; IV Piggyback:300]  Out: 5826 [Urine:126; Other:5700]    Physical Exam   Constitutional: He is oriented to person, place, and time. He has a sickly appearance. No distress. Nasal cannula in place.   HENT:   Head: Normocephalic and  atraumatic.   Eyes: Pupils are equal, round, and reactive to light.   Neck: Normal range of motion. Neck supple. No JVD present.   Cardiovascular: Regular rhythm.  Tachycardia present.  Exam reveals no gallop and no friction rub.    No murmur heard.  Pulmonary/Chest: Effort normal. He has decreased breath sounds in the right lower field and the left lower field. He has no wheezes. He has no rales.   Abdominal: Soft. He exhibits distension (positive asitis ). There is no tenderness. There is no guarding.   Wound vac to incision. Positive ascitis new drain inlacdwithbag hooked to bedside   Musculoskeletal: He exhibits edema (2+ pitting edema in LE ).   Neurological: He is alert and oriented to person, place, and time.   Positive Asterixis, somnolent and nauseated   Skin: Skin is warm and dry. Capillary refill takes less than 2 seconds. He is not diaphoretic.   Psychiatric: His behavior is normal. He exhibits a depressed mood.   Nursing note and vitals reviewed.      Significant Labs:  ABGs: No results for input(s): PH, PCO2, HCO3, POCSATURATED, BE in the last 168 hours.  BMP:     Recent Labs  Lab 12/14/17  0500   GLU 99   CL 95   CO2 28   BUN 31*   CREATININE 4.7*   CALCIUM 9.0   MG 2.2     CBC:     Recent Labs  Lab 12/14/17  0500   WBC 2.95*   RBC 2.50*   HGB 7.9*   HCT 23.2*      MCV 93   MCH 31.6*   MCHC 34.1     CMP:     Recent Labs  Lab 12/14/17  0500   GLU 99   CALCIUM 9.0   ALBUMIN 2.0*   PROT 5.0*   *   K 4.4   CO2 28   CL 95   BUN 31*   CREATININE 4.7*   ALKPHOS 85   ALT <5*   AST 20   BILITOT 2.1*     All labs within the past 24 hours have been reviewed.     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 He was admitted for peritonitis from 11/24-11/30 for peritonitis.     Plan:   - LAURA on CKD 4  (baseline sCr 3.9 - 4.0). Likely instrinsic etiology, multifactorial secondary to sepsis vs. Toxins   - HD x 3.5 hrs  Today for metabolic clearance and volume management   - Target 1-2 lt as tolerated keep MAP > 65  - would recommend to maintain Hg > 8 for improved chance on renal recovery.  - Avoid nephrotoxic medication, renal dose medications and avoid contrast   - Renal diet  - EPO with HD            Thank you for your consult. I will follow-up with patient. Please contact us if you have any additional questions.    Krish Frausto MD  Nephrology  [facility]

## 2017-12-15 NOTE — PLAN OF CARE
Problem: Patient Care Overview  Goal: Plan of Care Review  Outcome: Ongoing (interventions implemented as appropriate)  3HR HR HD completed, net fluid [id number], received albumin and midodrine to help with the b/p,  target goal achieved. Pt tolerated well. Dialysis catheter ports locked with HEPARIN 1000units/ml to fill lumen volume, ends capped and secured. Report given to Rik SHEEHAN

## 2017-12-15 NOTE — ASSESSMENT & PLAN NOTE
- DDx includes primary hypothyroidism vs non-thyroidal illness  - On initial presentation TSH 11.467; T4 Total <3.0; Free T4 0.53  - Repeat TFTs on 12/11 significant for TSH 17.512, T4, Free 0.66 following 3 days of treatment with IV levothyroxine.  - TPO antibody negative  - Today, TSH improved to 13.3, T4 0.84  - Ordered Reverse T3 26.7 at the upper limit of normal, however pt does have renal disease which can make interpretation of reverse T3 difficult. More likely pt has primary hypothyroidism and will treat as such.   - Continue Levothyroxine 75 mcg QD PO to be given 1 hour prior to breakfast/ AM tube feed  - Will repeat TFTs in 1 week.   - Will titrate dose based on lab trends

## 2017-12-15 NOTE — PROGRESS NOTES
Follow up on chevron wound  NPWT dressing changed this morning with the team in attendance to visualize the wound.   Agree wound looks beefy red and granulating . Left side is up to skin surface and still have some filling in to do with right side of wound . Continue use of black granufoam and continuous suction setting at 125mmHg.     12/15/17 0900       Incision/Site 10/30/17 0911 abdomen transverse   Date First Assessed/Time First Assessed: 10/30/17 0911   Present Prior to Hospital Arrival?: Yes  Location: abdomen  Orientation: transverse   Incision WDL ex   Dressing Appearance dry;intact   Appearance clean;moist;pink;granulating   Periwound Area normal skin tone   Drainage Characteristics/Odor serous   Drainage Amount moderate   Wound Edges open   Cleansed W/ sterile normal saline   Irrigated W/ sterile normal saline   Therapy Setting continuous therapy;125 mmHg   Pressure Setting 125 mmHg   Negative Pressure Wound Therapy Dressing foam, black   Sponges Inserted 2   Black Sponges Inserted 2   Sponges Removed 2   Black Sponges Removed 2   Dressing Change Due 12/19/17     Zoë Murphy RN CWON  [id number]

## 2017-12-15 NOTE — SUBJECTIVE & OBJECTIVE
Scheduled Meds:   atovaquone  1,500 mg Oral Daily    bisacodyl  10 mg Rectal Once    ciprofloxacin HCl  500 mg Oral Daily    cycloSPORINE modified (NEORAL)  125 mg Oral BID    docusate sodium  100 mg Oral TID PC    heparin (porcine)  5,000 Units Subcutaneous Q8H    isavuconazonium sulfate  372 mg Oral Daily    levETIRAcetam  500 mg Oral BID    levothyroxine  75 mcg Oral Before breakfast    metroNIDAZOLE  500 mg Oral Q8H    pantoprazole  40 mg Oral Daily    polyethylene glycol  17 g Oral BID    ursodiol  300 mg Oral BID     Continuous Infusions:    PRN Meds:sodium chloride 0.9%, acetaminophen, albumin human 25%, bisacodyl, heparin (porcine), midodrine, ondansetron, ondansetron, oxyCODONE, oxyCODONE, ramelteon, simethicone, sodium chloride 0.9%    Review of Systems   Constitutional: Positive for activity change, appetite change and fatigue. Negative for chills and fever.   Respiratory: Negative for cough, shortness of breath and wheezing.    Cardiovascular: Positive for leg swelling.   Gastrointestinal: Positive for abdominal distention, abdominal pain and constipation. Negative for nausea and vomiting.   Genitourinary: Negative for difficulty urinating and dysuria.   Skin: Positive for wound.   Allergic/Immunologic: Positive for immunocompromised state.   Neurological: Positive for weakness.   Psychiatric/Behavioral: Negative for confusion and decreased concentration. The patient is not nervous/anxious.      Objective:     Vital Signs (Most Recent):  Temp: 100.3 °F (37.9 °C) (12/15/17 1119)  Pulse: 102 (12/15/17 1119)  Resp: 18 (12/15/17 1119)  BP: 106/60 (12/15/17 1119)  SpO2: 98 % (12/15/17 1119) Vital Signs (24h Range):  Temp:  [98.5 °F (36.9 °C)-100.3 °F (37.9 °C)] 100.3 °F (37.9 °C)  Pulse:  [] 102  Resp:  [16-18] 18  SpO2:  [95 %-100 %] 98 %  BP: ()/(55-71) 106/60     Weight: 73.4 kg (161 lb 13.1 oz)  Body mass index is 26.12 kg/m².    Intake/Output - Last 3 Shifts       12/13 0700 -  12/14 0659 12/14 0700 - 12/15 0659 12/15 0700 - 12/16 0659    P.O. 940 900     I.V. (mL/kg)  0 (0)     Other  700     IV Piggyback 200 200     .8 122.9 705.2    Total Intake(mL/kg) 2113.8 (28.6) 1922.9 (26.2) 705.2 (9.6)    Urine (mL/kg/hr) 111 (0.1) 40 (0)     Emesis/NG output 0 (0) 0 (0)     Other 3000 (1.7) 2765 (1.6)     Stool 0 (0) 0 (0)     Blood 0 (0) 0 (0)     Total Output 3111 2805      Net -997.2 -882.1 +705.2           Urine Occurrence 0 x 0 x     Stool Occurrence 1 x 0 x     Emesis Occurrence 0 x 0 x           Physical Exam   Constitutional: He is oriented to person, place, and time. He appears well-developed. No distress.   Temporal and distal extremity muscle wasting   HENT:   Head: Normocephalic and atraumatic.   Eyes: No scleral icterus.   Neck: Normal range of motion. Neck supple. No JVD present.   Cardiovascular: Normal rate, regular rhythm and normal heart sounds.    No murmur heard.  Pulmonary/Chest: Effort normal. No respiratory distress. He has decreased breath sounds in the right middle field, the right lower field and the left lower field. He has no wheezes. He exhibits no tenderness.   Abdominal: Soft. He exhibits distension. There is tenderness (diffuse). There is no rebound and no guarding.   Wound vac to incision   Musculoskeletal: Normal range of motion. He exhibits edema (2+ LE edema). He exhibits no tenderness.   Neurological: He is alert and oriented to person, place, and time. He has normal reflexes.   Skin: Skin is warm and dry. He is not diaphoretic.   Psychiatric: His behavior is normal. Judgment and thought content normal. His mood appears anxious.   Pt anxious and expressing feeling overwhelmed   Nursing note and vitals reviewed.      Laboratory:  Immunosuppressants         Stop Route Frequency     cycloSPORINE modified capsule 125 mg      -- Oral 2 times daily        CBC:     Recent Labs  Lab 12/15/17  0400 12/15/17  0745   WBC 3.19*  --    RBC 2.40*  --    HGB 7.6* 7.6*    HCT 22.8* 22.7*     --    MCV 95  --    MCH 31.7*  --    MCHC 33.3  --      CMP:     Recent Labs  Lab 12/15/17  0400   GLU 88   CALCIUM 8.6*   ALBUMIN 2.0*   PROT 5.2*   *   K 4.0   CO2 23      BUN 17   CREATININE 3.3*   ALKPHOS 85   ALT 5*   AST 17   BILITOT 2.1*     Coagulation:     Recent Labs  Lab 12/11/17  0500   INR 1.1     Cardiac Markers: No results for input(s): CKMB, TROPONINT, MYOGLOBIN in the last 168 hours.  Labs within the past 24 hours have been reviewed.    Diagnostic Results:  I have personally reviewed all pertinent imaging studies.

## 2017-12-15 NOTE — PROGRESS NOTES
"[facility]  Endocrinology  Progress Note    Admit Date: 12/3/2017     26 yo M with HX of alcoholic cirrhosis s/p DDLT 10/19/2017, complicated by ATN requiring HD, superificial wound infection s/p wound vac placement, peritonitis negative for bile leak, and most recently admitted with abdominal pain, fever, hyperbilirubinemia, and leukocytosis s/p ERCP 12/6/17 significant for post-anastamosis stricture; stent placed.     Endocrinology was consulted regarding hypothyroidism noted on recent labs. Pt denies any prior history of thyroid disorders.  Family history significant for maternal grandmother with thyroid disease and subclinical hypothyroid disease in both mother and sister not requiring treatment.  Pt denies any medication history of amiodarone or lithium use. He denies any hair loss, dry skin, brittle nails, cold intolerance, or hoaseness. He endorses complaints of fatigue, SOB, and constipation, however, notes that these symptoms can also likely be attributed to complications associated with his liver disease and ascites. Pt notes his SOB improved with paracentesis 2 days ago.        Interval HPI:   Overnight events: NAEON.     BP (!) 113/56 (BP Location: Left arm, Patient Position: Lying)   Pulse 101   Temp 99.9 °F (37.7 °C) (Oral)   Resp 18   Ht 5' 6" (1.676 m)   Wt 73.4 kg (161 lb 13.1 oz)   SpO2 98%   BMI 26.12 kg/m²       Labs Reviewed and Include      Recent Labs  Lab 12/15/17  0400   GLU 88   CALCIUM 8.6*   ALBUMIN 2.0*   PROT 5.2*   *   K 4.0   CO2 23      BUN 17   CREATININE 3.3*   ALKPHOS 85   ALT 5*   AST 17   BILITOT 2.1*     Lab Results   Component Value Date    WBC 3.19 (L) 12/15/2017    HGB 7.6 (L) 12/15/2017    HCT 22.7 (L) 12/15/2017    MCV 95 12/15/2017     12/15/2017       Recent Labs  Lab 12/11/17  0500 12/15/17  0400   TSH 17.512* 13.308*   FREET4 0.66* 0.84     Lab Results   Component Value Date    HGBA1C <4.0 (A) 10/18/2017       Nutritional " status:   Body mass index is 26.12 kg/m².  Lab Results   Component Value Date    ALBUMIN 2.0 (L) 12/15/2017    ALBUMIN 2.0 (L) 12/14/2017    ALBUMIN 1.9 (L) 12/13/2017     Lab Results   Component Value Date    PREALBUMIN 5 (L) 10/11/2017       Estimated Creatinine Clearance: 30.1 mL/min (based on SCr of 3.3 mg/dL (H)).    Accu-Checks  No results for input(s): POCTGLUCOSE in the last 72 hours.    Current Medications and/or Treatments Impacting Glycemic Control  Immunotherapy:  Immunosuppressants         Stop Route Frequency     cycloSPORINE modified capsule 125 mg      -- Oral 2 times daily        Steroids:   Hormones     None        Pressors:    Autonomic Drugs     Start     Stop Route Frequency Ordered    12/06/17 1850  midodrine tablet 5 mg      -- Oral Use PRN 12/06/17 1751        Hyperglycemia/Diabetes Medications: Antihyperglycemics     None          ASSESSMENT and PLAN    Hypothyroidism    - DDx includes primary hypothyroidism vs non-thyroidal illness  - On initial presentation TSH 11.467; T4 Total <3.0; Free T4 0.53  - Repeat TFTs on 12/11 significant for TSH 17.512, T4, Free 0.66 following 3 days of treatment with IV levothyroxine.  - TPO antibody negative  - Today, TSH improved to 13.3, T4 0.84  - Ordered Reverse T3 26.7 at the upper limit of normal, however pt does have renal disease which can make interpretation of reverse T3 difficult. More likely pt has primary hypothyroidism and will treat as such.   - Continue Levothyroxine 75 mcg QD PO to be given 1 hour prior to breakfast/ AM tube feed  - Will repeat TFTs in 1 week.   - Will titrate dose based on lab trends        Biliary stricture of transplanted liver    - Management per primary          Liver transplanted    - Management per primary team            Radhika Sabillon MD  Endocrinology  [facility]

## 2017-12-15 NOTE — PROGRESS NOTES
ARF HD initiated via RT IJ Tunneled catheter without difficulty, ports aspirate and flush well without any difficulty.

## 2017-12-15 NOTE — SUBJECTIVE & OBJECTIVE
Interval History: NAEON. Peritoneal drain with ~ 2 lts,  Net neg 997 ml/24hrs. Diffuse abdominal pain better today he looks more comfortable. Seen while on HD with out complications tolerating well. TPN bag infusing K bath adjusted.    Review of patient's allergies indicates:   Allergen Reactions    Bactrim [sulfamethoxazole-trimethoprim] Other (See Comments)     Mookie Trell Syndrome     Current Facility-Administered Medications   Medication Frequency    0.9%  NaCl infusion PRN    acetaminophen tablet 650 mg Q8H PRN    albumin human 25% bottle 25 g PRN    atovaquone suspension 1,500 mg Daily    bisacodyl suppository 10 mg Daily PRN    bisacodyl suppository 10 mg Once    cycloSPORINE modified capsule 125 mg BID    docusate sodium capsule 100 mg TID PC    fat emulsion 20% infusion 250 mL Daily    heparin (porcine) injection 1,000 Units PRN    heparin (porcine) injection 5,000 Units Q8H    isavuconazonium sulfate Cap 372 mg Daily    levETIRAcetam tablet 500 mg BID    levothyroxine tablet 75 mcg Before breakfast    midodrine tablet 5 mg PRN    ondansetron disintegrating tablet 8 mg Q8H PRN    ondansetron injection 4 mg Q8H PRN    oxyCODONE immediate release tablet 10 mg Q3H PRN    oxyCODONE immediate release tablet 5 mg Q3H PRN    pantoprazole EC tablet 40 mg Daily    piperacillin-tazobactam 4.5 g in dextrose 5 % 100 mL IVPB (ready to mix system) Q12H    polyethylene glycol packet 17 g BID    ramelteon tablet 8 mg Nightly PRN    simethicone chewable tablet 80 mg TID PRN    sodium chloride 0.9% flush 3 mL PRN    TPN ADULT CENTRAL LINE CUSTOM Continuous    TPN ADULT CENTRAL LINE CUSTOM Continuous    ursodiol capsule 300 mg BID       Objective:     Vital Signs (Most Recent):  Temp: 98.5 °F (36.9 °C) (12/14/17 1830)  Pulse: 95 (12/14/17 1900)  Resp: 18 (12/14/17 1830)  BP: 121/65 (12/14/17 1830)  SpO2: 97 % (12/14/17 1657)  O2 Device (Oxygen Therapy): room air (12/14/17 1830) Vital Signs (24h  Range):  Temp:  [98.5 °F (36.9 °C)-100.4 °F (38 °C)] 98.5 °F (36.9 °C)  Pulse:  [] 95  Resp:  [13-18] 18  SpO2:  [96 %-99 %] 97 %  BP: ()/(51-71) 121/65     Weight: 73.9 kg (162 lb 14.7 oz) (12/14/17 0600)  Body mass index is 26.3 kg/m².  Body surface area is 1.86 meters squared.    I/O last 3 completed shifts:  In: 3213.8 [P.O.:1240; Other:700; IV Piggyback:300]  Out: 5826 [Urine:126; Other:5700]    Physical Exam   Constitutional: He is oriented to person, place, and time. He has a sickly appearance. No distress. Nasal cannula in place.   HENT:   Head: Normocephalic and atraumatic.   Eyes: Pupils are equal, round, and reactive to light.   Neck: Normal range of motion. Neck supple. No JVD present.   Cardiovascular: Regular rhythm.  Tachycardia present.  Exam reveals no gallop and no friction rub.    No murmur heard.  Pulmonary/Chest: Effort normal. He has decreased breath sounds in the right lower field and the left lower field. He has no wheezes. He has no rales.   Abdominal: Soft. He exhibits distension (positive asitis ). There is no tenderness. There is no guarding.   Wound vac to incision. Positive ascitis new drain inlacdwithbag hooked to bedside   Musculoskeletal: He exhibits edema (2+ pitting edema in LE ).   Neurological: He is alert and oriented to person, place, and time.   Positive Asterixis, somnolent and nauseated   Skin: Skin is warm and dry. Capillary refill takes less than 2 seconds. He is not diaphoretic.   Psychiatric: His behavior is normal. He exhibits a depressed mood.   Nursing note and vitals reviewed.      Significant Labs:  ABGs: No results for input(s): PH, PCO2, HCO3, POCSATURATED, BE in the last 168 hours.  BMP:     Recent Labs  Lab 12/14/17  0500   GLU 99   CL 95   CO2 28   BUN 31*   CREATININE 4.7*   CALCIUM 9.0   MG 2.2     CBC:     Recent Labs  Lab 12/14/17  0500   WBC 2.95*   RBC 2.50*   HGB 7.9*   HCT 23.2*      MCV 93   MCH 31.6*   MCHC 34.1     CMP:     Recent  Labs  Lab 12/14/17  0500   GLU 99   CALCIUM 9.0   ALBUMIN 2.0*   PROT 5.0*   *   K 4.4   CO2 28   CL 95   BUN 31*   CREATININE 4.7*   ALKPHOS 85   ALT <5*   AST 20   BILITOT 2.1*     All labs within the past 24 hours have been reviewed.     Significant Imaging:  Labs: Reviewed  X-Ray: Reviewed

## 2017-12-16 LAB
ALBUMIN SERPL BCP-MCNC: 1.9 G/DL
ALP SERPL-CCNC: 81 U/L
ALT SERPL W/O P-5'-P-CCNC: <5 U/L
ANION GAP SERPL CALC-SCNC: 11 MMOL/L
ANISOCYTOSIS BLD QL SMEAR: SLIGHT
AST SERPL-CCNC: 18 U/L
BACTERIA SPEC AEROBE CULT: NO GROWTH
BASOPHILS # BLD AUTO: 0.04 K/UL
BASOPHILS NFR BLD: 1.3 %
BILIRUB SERPL-MCNC: 2.3 MG/DL
BLD PROD TYP BPU: NORMAL
BLOOD UNIT EXPIRATION DATE: NORMAL
BLOOD UNIT TYPE CODE: 5100
BLOOD UNIT TYPE: NORMAL
BUN SERPL-MCNC: 26 MG/DL
CALCIUM SERPL-MCNC: 8.9 MG/DL
CHLORIDE SERPL-SCNC: 100 MMOL/L
CO2 SERPL-SCNC: 23 MMOL/L
CODING SYSTEM: NORMAL
CREAT SERPL-MCNC: 4.6 MG/DL
CYCLOSPORINE BLD LC/MS/MS-MCNC: 239 NG/ML
DIFFERENTIAL METHOD: ABNORMAL
DISPENSE STATUS: NORMAL
EOSINOPHIL # BLD AUTO: 0 K/UL
EOSINOPHIL NFR BLD: 0.6 %
ERYTHROCYTE [DISTWIDTH] IN BLOOD BY AUTOMATED COUNT: 15.7 %
EST. GFR  (AFRICAN AMERICAN): 18.6 ML/MIN/1.73 M^2
EST. GFR  (NON AFRICAN AMERICAN): 16.1 ML/MIN/1.73 M^2
GLUCOSE SERPL-MCNC: 66 MG/DL
HCT VFR BLD AUTO: 22.4 %
HGB BLD-MCNC: 7.5 G/DL
HYPOCHROMIA BLD QL SMEAR: ABNORMAL
IMM GRANULOCYTES # BLD AUTO: 0.11 K/UL
IMM GRANULOCYTES NFR BLD AUTO: 3.5 %
LYMPHOCYTES # BLD AUTO: 1 K/UL
LYMPHOCYTES NFR BLD: 31.6 %
MAGNESIUM SERPL-MCNC: 2 MG/DL
MCH RBC QN AUTO: 30.6 PG
MCHC RBC AUTO-ENTMCNC: 33.5 G/DL
MCV RBC AUTO: 91 FL
MONOCYTES # BLD AUTO: 0.7 K/UL
MONOCYTES NFR BLD: 23.3 %
NEUTROPHILS # BLD AUTO: 1.2 K/UL
NEUTROPHILS NFR BLD: 39.7 %
NRBC BLD-RTO: 0 /100 WBC
OVALOCYTES BLD QL SMEAR: ABNORMAL
PHOSPHATE SERPL-MCNC: 4.2 MG/DL
PLATELET # BLD AUTO: 283 K/UL
PMV BLD AUTO: 9.5 FL
POIKILOCYTOSIS BLD QL SMEAR: SLIGHT
POLYCHROMASIA BLD QL SMEAR: ABNORMAL
POTASSIUM SERPL-SCNC: 4.7 MMOL/L
PROT SERPL-MCNC: 5.1 G/DL
RBC # BLD AUTO: 2.45 M/UL
SODIUM SERPL-SCNC: 134 MMOL/L
TRANS ERYTHROCYTES VOL PATIENT: NORMAL ML
WBC # BLD AUTO: 3.13 K/UL

## 2017-12-16 PROCEDURE — 84100 ASSAY OF PHOSPHORUS: CPT

## 2017-12-16 PROCEDURE — 25000003 PHARM REV CODE 250: Performed by: PHYSICIAN ASSISTANT

## 2017-12-16 PROCEDURE — 25000003 PHARM REV CODE 250: Performed by: STUDENT IN AN ORGANIZED HEALTH CARE EDUCATION/TRAINING PROGRAM

## 2017-12-16 PROCEDURE — 80053 COMPREHEN METABOLIC PANEL: CPT

## 2017-12-16 PROCEDURE — 20600001 HC STEP DOWN PRIVATE ROOM

## 2017-12-16 PROCEDURE — 25000003 PHARM REV CODE 250: Performed by: NURSE PRACTITIONER

## 2017-12-16 PROCEDURE — 36430 TRANSFUSION BLD/BLD COMPNT: CPT

## 2017-12-16 PROCEDURE — 63600175 PHARM REV CODE 636 W HCPCS: Performed by: PHYSICIAN ASSISTANT

## 2017-12-16 PROCEDURE — 90935 HEMODIALYSIS ONE EVALUATION: CPT

## 2017-12-16 PROCEDURE — 99233 SBSQ HOSP IP/OBS HIGH 50: CPT | Mod: 24,,, | Performed by: NURSE PRACTITIONER

## 2017-12-16 PROCEDURE — 63600175 PHARM REV CODE 636 W HCPCS: Performed by: HOSPITALIST

## 2017-12-16 PROCEDURE — 90935 HEMODIALYSIS ONE EVALUATION: CPT | Mod: ,,, | Performed by: INTERNAL MEDICINE

## 2017-12-16 PROCEDURE — 25000003 PHARM REV CODE 250: Performed by: TRANSPLANT SURGERY

## 2017-12-16 PROCEDURE — P9021 RED BLOOD CELLS UNIT: HCPCS

## 2017-12-16 PROCEDURE — 85025 COMPLETE CBC W/AUTO DIFF WBC: CPT

## 2017-12-16 PROCEDURE — 80158 DRUG ASSAY CYCLOSPORINE: CPT

## 2017-12-16 PROCEDURE — 25000003 PHARM REV CODE 250: Performed by: HOSPITALIST

## 2017-12-16 PROCEDURE — 83735 ASSAY OF MAGNESIUM: CPT

## 2017-12-16 RX ORDER — HYDROCODONE BITARTRATE AND ACETAMINOPHEN 500; 5 MG/1; MG/1
TABLET ORAL
Status: DISCONTINUED | OUTPATIENT
Start: 2017-12-16 | End: 2017-12-20 | Stop reason: HOSPADM

## 2017-12-16 RX ADMIN — LEVETIRACETAM 500 MG: 500 TABLET, FILM COATED ORAL at 09:12

## 2017-12-16 RX ADMIN — OXYCODONE HYDROCHLORIDE 10 MG: 5 TABLET ORAL at 08:12

## 2017-12-16 RX ADMIN — ERYTHROPOIETIN 10000 UNITS: 10000 INJECTION, SOLUTION INTRAVENOUS; SUBCUTANEOUS at 09:12

## 2017-12-16 RX ADMIN — PANTOPRAZOLE SODIUM 40 MG: 40 TABLET, DELAYED RELEASE ORAL at 05:12

## 2017-12-16 RX ADMIN — ERYTHROPOIETIN 10000 UNITS: 10000 INJECTION, SOLUTION INTRAVENOUS; SUBCUTANEOUS at 05:12

## 2017-12-16 RX ADMIN — ISAVUCONAZONIUM SULFATE 372 MG: 186 CAPSULE ORAL at 09:12

## 2017-12-16 RX ADMIN — HEPARIN SODIUM 1000 UNITS: 1000 INJECTION, SOLUTION INTRAVENOUS; SUBCUTANEOUS at 05:12

## 2017-12-16 RX ADMIN — OXYCODONE HYDROCHLORIDE 10 MG: 5 TABLET ORAL at 11:12

## 2017-12-16 RX ADMIN — URSODIOL 300 MG: 300 CAPSULE ORAL at 09:12

## 2017-12-16 RX ADMIN — ATOVAQUONE 1500 MG: 750 SUSPENSION ORAL at 05:12

## 2017-12-16 RX ADMIN — LEVOTHYROXINE SODIUM 75 MCG: 50 TABLET ORAL at 05:12

## 2017-12-16 RX ADMIN — METRONIDAZOLE 500 MG: 500 TABLET ORAL at 06:12

## 2017-12-16 RX ADMIN — HEPARIN SODIUM 5000 UNITS: 5000 INJECTION, SOLUTION INTRAVENOUS; SUBCUTANEOUS at 09:12

## 2017-12-16 RX ADMIN — OXYCODONE HYDROCHLORIDE 10 MG: 5 TABLET ORAL at 07:12

## 2017-12-16 RX ADMIN — METRONIDAZOLE 500 MG: 500 TABLET ORAL at 05:12

## 2017-12-16 RX ADMIN — CIPROFLOXACIN HYDROCHLORIDE 500 MG: 500 TABLET, FILM COATED ORAL at 05:12

## 2017-12-16 RX ADMIN — OXYCODONE HYDROCHLORIDE 10 MG: 5 TABLET ORAL at 04:12

## 2017-12-16 RX ADMIN — OXYCODONE HYDROCHLORIDE 10 MG: 5 TABLET ORAL at 05:12

## 2017-12-16 RX ADMIN — SODIUM CHLORIDE: 0.9 INJECTION, SOLUTION INTRAVENOUS at 05:12

## 2017-12-16 RX ADMIN — OXYCODONE HYDROCHLORIDE 10 MG: 5 TABLET ORAL at 10:12

## 2017-12-16 RX ADMIN — CYCLOSPORINE 125 MG: 100 CAPSULE, LIQUID FILLED ORAL at 05:12

## 2017-12-16 RX ADMIN — DOCUSATE SODIUM 100 MG: 100 CAPSULE, LIQUID FILLED ORAL at 09:12

## 2017-12-16 RX ADMIN — METRONIDAZOLE 500 MG: 500 TABLET ORAL at 09:12

## 2017-12-16 RX ADMIN — HEPARIN SODIUM 1000 UNITS: 1000 INJECTION, SOLUTION INTRAVENOUS; SUBCUTANEOUS at 04:12

## 2017-12-16 RX ADMIN — CYCLOSPORINE 125 MG: 100 CAPSULE, LIQUID FILLED ORAL at 06:12

## 2017-12-16 RX ADMIN — POLYETHYLENE GLYCOL 3350 17 G: 17 POWDER, FOR SOLUTION ORAL at 09:12

## 2017-12-16 RX ADMIN — OXYCODONE HYDROCHLORIDE 10 MG: 5 TABLET ORAL at 01:12

## 2017-12-16 RX ADMIN — URSODIOL 300 MG: 300 CAPSULE ORAL at 05:12

## 2017-12-16 RX ADMIN — DOCUSATE SODIUM 100 MG: 100 CAPSULE, LIQUID FILLED ORAL at 06:12

## 2017-12-16 RX ADMIN — HEPARIN SODIUM 5000 UNITS: 5000 INJECTION, SOLUTION INTRAVENOUS; SUBCUTANEOUS at 05:12

## 2017-12-16 NOTE — PROGRESS NOTES
[facility]  Liver Transplant  Progress Note    Patient Name: Jhonny Diana  MRN: [id number]  Admission Date: 12/3/2017  Hospital Length of Stay: 13 days  Code Status: Full Code  Primary Care Provider: Primary Doctor No  Post-Operative Day: 58    ORGAN:   LIVER  Disease Etiology: Acute Alcoholic Hepatitis  Donor Type:    - Brain Death  CDC High Risk:   No  Donor CMV Status:   Donor CMV Status: Positive  Donor HBcAB:   Negative  Donor HCV Status:   Negative  Whole or Partial: Whole Liver  Biliary Anastomosis: End to End  Arterial Anatomy: Standard  Subjective:     History of Present Illness:  Mr. Diana is a 26yo man w/a history of alcoholic cirrhosis s/p DDLT 10/19/2017; c/b seizures (swtiched off prograf to cyclo), ATN requiring HD (last HD ), superificial wound infection s/p wound vac to chevron incision, and recent admit for fevers on  (discharged on empiric augmentin for suspected superficial wound infection) and readmitted  again with fever. Found to have peritonitis (WBC 5000, 75% PNM) neg for bile leak. He was treated initially with vanc/cefepime. Repeat cell counts  with some improvement (WBC 1400, 45% PNM). Pt transitioned to augmentin upon d/c .      Pt presented to the ED 12/3 morning with subjective fever (100.5) and general malaise beginning at 8pm day prior. His abdomen soft, but diffusely tender. Labs reveal elevated WBC count to 12.7 from 6.5 in addition to elevated tbili 3.5 from 2.3 when compared to labs 3 days ago, despite being on augmentin for recent diagnosis of peritonitis. He denied nausea, vomiting, diarrhea, congestion, cough, CP or SOB. He received 1 L bolus NS in the ED. Infectious mosley intiated, BS abx (vanc/cef) started, and obtained CT abdomen.     He has a wound vac in place.     Hospital Course:  Interval History:      : Pt in slightly better spirits today, joking with team. Pt remains with abdominal pain which is improving as  peritonitis continues to resolve (last para ). Bili trending down at 5.2 today, cont ursodiol. PO intake remains poor, will place SHANE tube and initiate tube feeds today, orders placed. Will give albumin x1. Per nephrology will plan for HD today. H/H low on morning labs will plan for 1 unit PRBC with HD. Of note, TSH checked yesterday, level of 11.4 with T4 <3.0, endo consulted- will start levothyroxine today. Cont to encourage PO intake and ambulation. PT ordered 12/7/17, unable to see patient today - will work with pt this weekend. Monitor.     12/9:  Pt with signif nausea and abd pain this AM.  D/c TF.  Place central line and will initiate TPN.  CT A/P obtained today with signif ascites.  Plan IR vs washout in OR.  ID consulted and recommend washout.  Pt transitioned to Zosyn and started on isavu.  Will change PO meds to shane as much as able.      12/10:  Pt with signif improvement today in nausea and feels hungry.  CT A/P 12/9 with signif ascites.  Plan for IR para with drain placement in AM.  Continue Zosyn/Vanc/Isavu.  Will reorder TPN.  D/c shane in AM if not going to transition back to TF.    12/11:  D/c shane.  Continue TPN.  Nausea still improved.  Continue current abx.  Para today with 4.5 L removed.  HD cancelled.  Ascitic fluid labs pending.  Pt NPO after midnight.  Blood/urine cx 12/9 NGTD.    12/12:  Cell count negative from para yesterday.  Pt without nausea today- was able to eat a small amount.  Calorie count initiated.  Plan for IR drain placement tomorrow.  HD today with blood transfusion.    12/13:  Drain placed in IR this AM with WBC/diff negative for infection.  2.5 L total removed- replaced with albumin 25% 100 cc.  Will type and screen tomorrow and tx if < 8 in HD.  Remains slightly tachy/hypotensive but improved.  Eating minimally- reorder TPN.      12/14: no acute events over night.  Drain output decreased this am.  Reports eating better per patient and mother.  Continue current abx &  "tpn.    12/15: HD 12/14.  Wound vac dressing changed today.  Continue to encourage oral intake.  Stop TPN.  Transition to oral antibiotics today per ID recs. Likely will need transfusion with HD in am.    12/16/17: Transfuse 1 unit prbc with hd today.  No acute events over night.  Continues to report "feeling fullness."  Will obtain abdominal/liver ultrasound to assess for fluid collections.    Scheduled Meds:   sodium chloride 0.9%   Intravenous Once    atovaquone  1,500 mg Oral Daily    bisacodyl  10 mg Rectal Once    ciprofloxacin HCl  500 mg Oral Daily    cycloSPORINE modified (NEORAL)  125 mg Oral BID    docusate sodium  100 mg Oral TID PC    epoetin maurisio (PROCRIT) injection  10,000 Units Intravenous Every Tues, Thurs, Sat    heparin (porcine)  5,000 Units Subcutaneous Q8H    isavuconazonium sulfate  372 mg Oral Daily    levETIRAcetam  500 mg Oral BID    levothyroxine  75 mcg Oral Before breakfast    metroNIDAZOLE  500 mg Oral Q8H    pantoprazole  40 mg Oral Daily    polyethylene glycol  17 g Oral BID    ursodiol  300 mg Oral BID     Continuous Infusions:    PRN Meds:sodium chloride, acetaminophen, albumin human 25%, bisacodyl, heparin (porcine), midodrine, ondansetron, ondansetron, oxyCODONE, oxyCODONE, ramelteon, simethicone, sodium chloride 0.9%    Review of Systems   Constitutional: Positive for activity change and appetite change. Negative for chills, fatigue and fever.   Respiratory: Negative for cough, shortness of breath and wheezing.    Cardiovascular: Positive for leg swelling.   Gastrointestinal: Positive for abdominal distention, abdominal pain and constipation. Negative for nausea and vomiting.   Genitourinary: Negative for difficulty urinating and dysuria.   Skin: Positive for wound.   Allergic/Immunologic: Positive for immunocompromised state.   Neurological: Positive for weakness.   Psychiatric/Behavioral: Negative for confusion and decreased concentration. The patient is not " nervous/anxious.      Objective:     Vital Signs (Most Recent):  Temp: 99 °F (37.2 °C) (12/16/17 0341)  Pulse: 110 (12/16/17 0700)  Resp: 20 (12/16/17 0341)  BP: (!) 107/57 (12/16/17 0341)  SpO2: 97 % (12/16/17 0341) Vital Signs (24h Range):  Temp:  [99 °F (37.2 °C)-100.3 °F (37.9 °C)] 99 °F (37.2 °C)  Pulse:  [] 110  Resp:  [17-20] 20  SpO2:  [96 %-98 %] 97 %  BP: (103-109)/(57-64) 107/57     Weight: 73 kg (160 lb 15 oz)  Body mass index is 25.98 kg/m².    Intake/Output - Last 3 Shifts       12/14 0700 - 12/15 0659 12/15 0700 - 12/16 0659 12/16 0700 - 12/17 0659    P.O. 900 80     I.V. (mL/kg) 0 (0)      Other 700 200     IV Piggyback 200      .9 966.9     Total Intake(mL/kg) 1922.9 (26.2) 1246.9 (17.1)     Urine (mL/kg/hr) 40 (0)      Emesis/NG output 0 (0)      Other 2765 (1.6)      Stool 0 (0)      Blood 0 (0)      Total Output 2805        Net -882.1 +1246.9             Urine Occurrence 0 x      Stool Occurrence 0 x      Emesis Occurrence 0 x            Physical Exam   Constitutional: He is oriented to person, place, and time. He appears well-developed. No distress.   Temporal and distal extremity muscle wasting   HENT:   Head: Normocephalic and atraumatic.   Eyes: No scleral icterus.   Neck: Normal range of motion. Neck supple. No JVD present.   Cardiovascular: Normal rate, regular rhythm, normal heart sounds and intact distal pulses.    No murmur heard.  Pulmonary/Chest: Effort normal. No respiratory distress. He has decreased breath sounds in the right middle field, the right lower field and the left lower field. He has no wheezes. He exhibits no tenderness.   Abdominal: Soft. He exhibits distension. There is tenderness (diffuse). There is no rebound and no guarding.   Wound vac to incision   Musculoskeletal: Normal range of motion. He exhibits edema (2+ LE edema). He exhibits no tenderness.   Neurological: He is alert and oriented to person, place, and time. He has normal reflexes.   Skin: Skin  is warm and dry. He is not diaphoretic.   Psychiatric: His behavior is normal. Judgment and thought content normal. His mood appears anxious.   Pt anxious and expressing feeling overwhelmed   Nursing note and vitals reviewed.      Laboratory:  Immunosuppressants         Stop Route Frequency     cycloSPORINE modified capsule 125 mg      -- Oral 2 times daily        CBC:     Recent Labs  Lab 12/16/17  0430   WBC 3.13*   RBC 2.45*   HGB 7.5*   HCT 22.4*      MCV 91   MCH 30.6   MCHC 33.5     CMP:     Recent Labs  Lab 12/16/17  0430   GLU 66*   CALCIUM 8.9   ALBUMIN 1.9*   PROT 5.1*   *   K 4.7   CO2 23      BUN 26*   CREATININE 4.6*   ALKPHOS 81   ALT <5*   AST 18   BILITOT 2.3*     Coagulation:     Recent Labs  Lab 12/11/17  0500   INR 1.1     Cardiac Markers: No results for input(s): CKMB, TROPONINT, MYOGLOBIN in the last 168 hours.  Labs within the past 24 hours have been reviewed.    Diagnostic Results:  I have personally reviewed all pertinent imaging studies.    Assessment/Plan:     * Fever    - See peritonitis  - Blood/urine cx repeated 12/9 NGTD          Peritonitis    - Diagnosed on previous admission.  Last para 11/29 with improving cell counts WBC 1400 with 45% PNMs   Was d/c'ed on augmentin, switched to vanc/cef at admit  - Repeat paracentesis 12/5/17, with  (improving from 1497)  - CT A/P repeated 12/9 with signif ascites  - Abx transitioned to Zosyn/Vanc and Isavu added 12/9 with signif clinical improvement  - Para 12/11 with 4.5 L removed - cell ct signif improved and negative for infection  - IR drain placed 1213 to drain remainder of ascites --> 2.5 L removed and replaced albumin 25% 100 cc x 2.  Cell count negative for infection  - discontinue zosyn/vanc/isavu, transitioned to oral cipro/flagyl 12/15/17  -repeat abd/liver u/s today to assess for fluid collections            LAURA (acute kidney injury)    -  Creatinine significantly elevated on 12/6 and HD resumed- was not on HD  at admit as kidney function had improved.   - HD last 12/14.  - UOP has trended down daily and is now minimal.  - Cont strict I/O's.   - plan for HD today          Liver transplanted    - A/w fevers and hyperbilirubinemia.   - LFTs improving.  - PBS consulted. ERCP attempted on 12/4 but unable to cannulate. MRCP on 12/4 noted with stricture.  - ERCP 12/6/17  With post-anastamosis stricture, stent placed.   - Para 12/5/17, 3L removed ( down from 1497, cr 5.2, bili 2.5)          Delayed surgical wound healing    - Wound vac in place, last changed 12/15 and healing well  - wound care following        Long-term use of immunosuppressant medication    - Continue cyclosporine  - Hold Cellcept for infection  - Will monitor for signs of toxic side effects, check daily Cyclosporine troughs, and change meds accordingly.        Prophylactic immunotherapy    - See long term immunosuppressant          At risk for opportunistic infections    - hold valcyte as wbc low, check cmv pcr (weekly surveillance)  - Continue Bactrim for PCP proph          Biliary stricture of transplanted liver    - Elevated t bili post liver txp in setting of abdominal pain, decreased appetitive and intermittent nausea.  PBS consulted to assess duct per ERCP.  - ERCP performed 12/5 PBS unable to cannulate. MRCP with stricture.  - ERCP 12/6/17 with post-anastamosis stricture with stent placed.   - Tbili peaked at 9.1, now trending down.   -started ursodiol 12/7/17.   - T bili has remained around 2 for last several days --> monitor closely.        Seizure    - Cont keppra          Severe malnutrition    - Pt malnourished with poor PO intake  - SHANE tube placed 12/8/17 and TF started  - Not tolerating TF --> central line placed and TPN started 12/9  - Remove shane 12/11        Hypothyroidism    -TSH 11.4  -T4 ,3.0  - Endo consulted. Start levothyroxine 75mcg PO. Instructions for administration given. Appreciate recs.           Anemia of chronic disease     - 1 unit PRBC with HD 12/8/17  - 1 u PRBC with HD 12/12  - Discussed Fe studies with nephrology- will continue PRN transfusions as with minimal Fe stores  - transfuse 1 unit prbc with HD 12/16  - Monitor CBC daily              VTE Risk Mitigation         Ordered     heparin (porcine) injection 5,000 Units  Every 8 hours     Route:  Subcutaneous        12/11/17 1126     heparin (porcine) injection 1,000 Units  As needed (PRN)     Route:  Intra-Catheter        12/08/17 1736     Medium Risk of VTE  Once      12/03/17 0249     Place sequential compression device  Until discontinued      12/03/17 0249          The patients clinical status was discussed at multidisplinary rounds, involving transplant surgery, transplant medicine, pharmacy, nursing, nutrition, and social work    Discharge Planning:  Not candidate at this time  Monitor kidney function --> may need outpt HD  Monitor HH needs vs rehab for deconditioned status      Seda Camacho, NP  Liver Transplant  [facility]

## 2017-12-16 NOTE — ASSESSMENT & PLAN NOTE
27-year-old male  - Alcoholic cirrhosis (c/b HE, EV, portal HTN, and HRS)  - s/p DDLT 10/19/2017, CMV D+/R+, steroid induction, on maintenance tacro/MMF/pred  - c/b seizures, transitioned from tacro to cyclo  - Superificial wound infection s/p wound vac to chevron   - Readmitted 11/21/2017 for suspected wound infection treated with amox-clav   - Readmitted 11/24/2017 with peritonitis (WBC 5000) discharged on amox-clav  - Biliary stricture s/p sphincterotomy, biliary stent placement in CBD 12/6/2017  - s/p abdominal drain placement 12/13/2017    Patient readmitted for ongoing nausea, vomiting, abdominal pain - reports symptoms have improved with biliary stent placement, antibiotics, and abdominal drain placement. Repeat ascites fluid analysis with resolution of peritonitis.    Recommendations:  - Have transitioned to ciprofloxacin and metronidazole for coverage, please continue.   - pt reporting some difficulty in swallowing medications, perhaps he would tolerate PO liquid formulations better.    - Continue isavuconazole for coverage of Candida and mold prophylaxis

## 2017-12-16 NOTE — SUBJECTIVE & OBJECTIVE
Scheduled Meds:   sodium chloride 0.9%   Intravenous Once    atovaquone  1,500 mg Oral Daily    bisacodyl  10 mg Rectal Once    ciprofloxacin HCl  500 mg Oral Daily    cycloSPORINE modified (NEORAL)  125 mg Oral BID    docusate sodium  100 mg Oral TID PC    epoetin maurisio (PROCRIT) injection  10,000 Units Intravenous Every Tues, Thurs, Sat    heparin (porcine)  5,000 Units Subcutaneous Q8H    isavuconazonium sulfate  372 mg Oral Daily    levETIRAcetam  500 mg Oral BID    levothyroxine  75 mcg Oral Before breakfast    metroNIDAZOLE  500 mg Oral Q8H    pantoprazole  40 mg Oral Daily    polyethylene glycol  17 g Oral BID    ursodiol  300 mg Oral BID     Continuous Infusions:    PRN Meds:sodium chloride, acetaminophen, albumin human 25%, bisacodyl, heparin (porcine), midodrine, ondansetron, ondansetron, oxyCODONE, oxyCODONE, ramelteon, simethicone, sodium chloride 0.9%    Review of Systems   Constitutional: Positive for activity change and appetite change. Negative for chills, fatigue and fever.   Respiratory: Negative for cough, shortness of breath and wheezing.    Cardiovascular: Positive for leg swelling.   Gastrointestinal: Positive for abdominal distention, abdominal pain and constipation. Negative for nausea and vomiting.   Genitourinary: Negative for difficulty urinating and dysuria.   Skin: Positive for wound.   Allergic/Immunologic: Positive for immunocompromised state.   Neurological: Positive for weakness.   Psychiatric/Behavioral: Negative for confusion and decreased concentration. The patient is not nervous/anxious.      Objective:     Vital Signs (Most Recent):  Temp: 99 °F (37.2 °C) (12/16/17 0341)  Pulse: 110 (12/16/17 0700)  Resp: 20 (12/16/17 0341)  BP: (!) 107/57 (12/16/17 0341)  SpO2: 97 % (12/16/17 0341) Vital Signs (24h Range):  Temp:  [99 °F (37.2 °C)-100.3 °F (37.9 °C)] 99 °F (37.2 °C)  Pulse:  [] 110  Resp:  [17-20] 20  SpO2:  [96 %-98 %] 97 %  BP: (103-109)/(57-64) 107/57      Weight: 73 kg (160 lb 15 oz)  Body mass index is 25.98 kg/m².    Intake/Output - Last 3 Shifts       12/14 0700 - 12/15 0659 12/15 0700 - 12/16 0659 12/16 0700 - 12/17 0659    P.O. 900 80     I.V. (mL/kg) 0 (0)      Other 700 200     IV Piggyback 200      .9 966.9     Total Intake(mL/kg) 1922.9 (26.2) 1246.9 (17.1)     Urine (mL/kg/hr) 40 (0)      Emesis/NG output 0 (0)      Other 2765 (1.6)      Stool 0 (0)      Blood 0 (0)      Total Output 2805        Net -882.1 +1246.9             Urine Occurrence 0 x      Stool Occurrence 0 x      Emesis Occurrence 0 x            Physical Exam   Constitutional: He is oriented to person, place, and time. He appears well-developed. No distress.   Temporal and distal extremity muscle wasting   HENT:   Head: Normocephalic and atraumatic.   Eyes: No scleral icterus.   Neck: Normal range of motion. Neck supple. No JVD present.   Cardiovascular: Normal rate, regular rhythm, normal heart sounds and intact distal pulses.    No murmur heard.  Pulmonary/Chest: Effort normal. No respiratory distress. He has decreased breath sounds in the right middle field, the right lower field and the left lower field. He has no wheezes. He exhibits no tenderness.   Abdominal: Soft. He exhibits distension. There is tenderness (diffuse). There is no rebound and no guarding.   Wound vac to incision   Musculoskeletal: Normal range of motion. He exhibits edema (2+ LE edema). He exhibits no tenderness.   Neurological: He is alert and oriented to person, place, and time. He has normal reflexes.   Skin: Skin is warm and dry. He is not diaphoretic.   Psychiatric: His behavior is normal. Judgment and thought content normal. His mood appears anxious.   Pt anxious and expressing feeling overwhelmed   Nursing note and vitals reviewed.      Laboratory:  Immunosuppressants         Stop Route Frequency     cycloSPORINE modified capsule 125 mg      -- Oral 2 times daily        CBC:     Recent Labs  Lab  12/16/17  0430   WBC 3.13*   RBC 2.45*   HGB 7.5*   HCT 22.4*      MCV 91   MCH 30.6   MCHC 33.5     CMP:     Recent Labs  Lab 12/16/17  0430   GLU 66*   CALCIUM 8.9   ALBUMIN 1.9*   PROT 5.1*   *   K 4.7   CO2 23      BUN 26*   CREATININE 4.6*   ALKPHOS 81   ALT <5*   AST 18   BILITOT 2.3*     Coagulation:     Recent Labs  Lab 12/11/17  0500   INR 1.1     Cardiac Markers: No results for input(s): CKMB, TROPONINT, MYOGLOBIN in the last 168 hours.  Labs within the past 24 hours have been reviewed.    Diagnostic Results:  I have personally reviewed all pertinent imaging studies.

## 2017-12-16 NOTE — SUBJECTIVE & OBJECTIVE
Interval History: NAEON. Peritoneal drain decreased out put to 65 ml overnight, UO ~ 40 ml/24hrs Net neg 882 ml/24hrs. He feel better except for drain causing some discomfort. Tolerated HD well TPN bag infusing.    Review of patient's allergies indicates:   Allergen Reactions    Bactrim [sulfamethoxazole-trimethoprim] Other (See Comments)     Mookie Trell Syndrome     Current Facility-Administered Medications   Medication Frequency    0.9%  NaCl infusion PRN    acetaminophen tablet 650 mg Q8H PRN    albumin human 25% bottle 25 g PRN    atovaquone suspension 1,500 mg Daily    bisacodyl suppository 10 mg Daily PRN    bisacodyl suppository 10 mg Once    ciprofloxacin HCl tablet 500 mg Daily    cycloSPORINE modified capsule 125 mg BID    docusate sodium capsule 100 mg TID PC    [START ON 12/16/2017] epoetin maurisio injection 10,000 Units Every Tues, Thurs, Sat    heparin (porcine) injection 1,000 Units PRN    heparin (porcine) injection 5,000 Units Q8H    isavuconazonium sulfate Cap 372 mg Daily    levETIRAcetam tablet 500 mg BID    levothyroxine tablet 75 mcg Before breakfast    metroNIDAZOLE tablet 500 mg Q8H    midodrine tablet 5 mg PRN    ondansetron disintegrating tablet 8 mg Q8H PRN    ondansetron injection 4 mg Q8H PRN    oxyCODONE immediate release tablet 10 mg Q3H PRN    oxyCODONE immediate release tablet 5 mg Q3H PRN    pantoprazole EC tablet 40 mg Daily    polyethylene glycol packet 17 g BID    ramelteon tablet 8 mg Nightly PRN    simethicone chewable tablet 80 mg TID PRN    sodium chloride 0.9% flush 3 mL PRN    ursodiol capsule 300 mg BID       Objective:     Vital Signs (Most Recent):  Temp: 99.8 °F (37.7 °C) (12/15/17 1658)  Pulse: 103 (12/15/17 1658)  Resp: 18 (12/15/17 1658)  BP: (!) 103/58 (12/15/17 1658)  SpO2: 96 % (12/15/17 1658)  O2 Device (Oxygen Therapy): room air (12/15/17 1658) Vital Signs (24h Range):  Temp:  [99 °F (37.2 °C)-100.3 °F (37.9 °C)] 99.8 °F (37.7  °C)  Pulse:  [] 103  Resp:  [16-18] 18  SpO2:  [95 %-100 %] 96 %  BP: (101-113)/(55-60) 103/58     Weight: 73.4 kg (161 lb 13.1 oz) (12/15/17 0408)  Body mass index is 26.12 kg/m².  Body surface area is 1.85 meters squared.    I/O last 3 completed shifts:  In: 3296.8 [P.O.:1600; Other:700; IV Piggyback:300]  Out: 3156 [Urine:111; Other:3045]    Physical Exam   Constitutional: He is oriented to person, place, and time. He has a sickly appearance. No distress.   HENT:   Head: Normocephalic and atraumatic.   Eyes: Pupils are equal, round, and reactive to light.   Neck: Normal range of motion. Neck supple. No JVD present.   Cardiovascular: Regular rhythm.  Tachycardia present.  Exam reveals no gallop and no friction rub.    No murmur heard.  Pulmonary/Chest: Effort normal. He has decreased breath sounds in the right lower field and the left lower field. He has no wheezes. He has no rales.   Abdominal: Soft. He exhibits distension (mild distention ). There is no tenderness. There is no guarding.   Wound vac to incision. Positive ascitis new drain inlacdwithbag hooked to bedside   Musculoskeletal: He exhibits edema (1+ pitting edema in LE ).   Neurological: He is alert and oriented to person, place, and time.   Positive Asterixis, somnolent and nauseated   Skin: Skin is warm and dry. Capillary refill takes less than 2 seconds. He is not diaphoretic.   Psychiatric: His behavior is normal. He exhibits a depressed mood.   Nursing note and vitals reviewed.      Significant Labs:  ABGs: No results for input(s): PH, PCO2, HCO3, POCSATURATED, BE in the last 168 hours.  BMP:     Recent Labs  Lab 12/15/17  0400   GLU 88      CO2 23   BUN 17   CREATININE 3.3*   CALCIUM 8.6*   MG 2.0     CBC:     Recent Labs  Lab 12/15/17  0400 12/15/17  0745   WBC 3.19*  --    RBC 2.40*  --    HGB 7.6* 7.6*   HCT 22.8* 22.7*     --    MCV 95  --    MCH 31.7*  --    MCHC 33.3  --      CMP:     Recent Labs  Lab 12/15/17  0400   GLU  88   CALCIUM 8.6*   ALBUMIN 2.0*   PROT 5.2*   *   K 4.0   CO2 23      BUN 17   CREATININE 3.3*   ALKPHOS 85   ALT 5*   AST 17   BILITOT 2.1*     All labs within the past 24 hours have been reviewed.     Significant Imaging:  Labs: Reviewed  X-Ray: Reviewed

## 2017-12-16 NOTE — PLAN OF CARE
AAOx4, VSS, Tele- NSR HR- 90's, tmax overnight 99.6- flagyl given PO  Wound vac dressing changed today, complains of pain, given oxycodone PRN x2 overnight, Bili-bag emptied= 200 overnight  Pt mom at bedside, instructed to call nurse if needed, no BM overnight.  Bed in lowest position, call light within reach, non skid socks worn, will continue to monitor.   Plan for HD in AM

## 2017-12-16 NOTE — SUBJECTIVE & OBJECTIVE
Interval History: Pt continues to endorse diffuse abdominal pain and fullness, also relating some difficulty in swallowing his ciprofloxacin and metronidazole    Review of Systems   Constitutional: Negative for chills and fever.   Respiratory: Negative for chest tightness and shortness of breath.    Cardiovascular: Negative for chest pain.   Gastrointestinal: Positive for abdominal pain. Negative for nausea and vomiting.     Objective:     Vital Signs (Most Recent):  Temp: 99.8 °F (37.7 °C) (12/15/17 1658)  Pulse: 103 (12/15/17 1658)  Resp: 18 (12/15/17 1658)  BP: (!) 103/58 (12/15/17 1658)  SpO2: 96 % (12/15/17 1658) Vital Signs (24h Range):  Temp:  [99 °F (37.2 °C)-100.3 °F (37.9 °C)] 99.8 °F (37.7 °C)  Pulse:  [] 103  Resp:  [16-18] 18  SpO2:  [95 %-100 %] 96 %  BP: (101-113)/(55-60) 103/58     Weight: 73.4 kg (161 lb 13.1 oz)  Body mass index is 26.12 kg/m².    Estimated Creatinine Clearance: 30.1 mL/min (based on SCr of 3.3 mg/dL (H)).    Physical Exam   Constitutional: He is oriented to person, place, and time. He appears well-developed and well-nourished.   HENT:   Head: Normocephalic and atraumatic.   Mouth/Throat: Oropharynx is clear and moist.   Eyes: Pupils are equal, round, and reactive to light. No scleral icterus.   Neck: No JVD present.   Cardiovascular: Normal rate, regular rhythm and normal heart sounds.    Pulmonary/Chest: Effort normal.   Abdominal: He exhibits distension. There is tenderness. There is no rebound.   Musculoskeletal: He exhibits edema.   Lymphadenopathy:     He has no cervical adenopathy.   Neurological: He is oriented to person, place, and time.   Skin: Skin is warm and dry. Capillary refill takes less than 2 seconds.       Significant Labs:   CBC:     Recent Labs  Lab 12/14/17  0500 12/15/17  0400 12/15/17  0745   WBC 2.95* 3.19*  --    HGB 7.9* 7.6* 7.6*   HCT 23.2* 22.8* 22.7*    234  --      CMP:     Recent Labs  Lab 12/14/17  0500 12/15/17  0400   * 134*    K 4.4 4.0   CL 95 101   CO2 28 23   GLU 99 88   BUN 31* 17   CREATININE 4.7* 3.3*   CALCIUM 9.0 8.6*   PROT 5.0* 5.2*   ALBUMIN 2.0* 2.0*   BILITOT 2.1* 2.1*   ALKPHOS 85 85   AST 20 17   ALT <5* 5*   ANIONGAP 12 10   EGFRNONAA 15.7* 24.1*     Microbiology Results (last 7 days)     Procedure Component Value Units Date/Time    Aerobic culture [[id number]] Collected:  12/11/17 1447    Order Status:  Completed Specimen:  Body Fluid from Ascites Updated:  12/15/17 1223     Aerobic Bacterial Culture No growth    Fungus culture [[id number]] Collected:  12/11/17 1447    Order Status:  Completed Specimen:  Body Fluid from Ascites Updated:  12/14/17 1403     Fungus (Mycology) Culture Culture in progress    Culture, Anaerobe [[id number]] Collected:  12/11/17 1447    Order Status:  Completed Specimen:  Body Fluid from Ascites Updated:  12/14/17 1203     Anaerobic Culture Culture in progress    Blood culture [[id number]] Collected:  12/09/17 0830    Order Status:  Completed Specimen:  Blood Updated:  12/14/17 1012     Blood Culture, Routine No growth after 5 days.    Blood culture [[id number]] Collected:  12/09/17 0830    Order Status:  Completed Specimen:  Blood Updated:  12/14/17 1012     Blood Culture, Routine No growth after 5 days.    Aerobic culture [[id number]] Collected:  12/13/17 0834    Order Status:  Completed Specimen:  Body Fluid from Ascites Updated:  12/14/17 0749     Aerobic Bacterial Culture No growth    Gram stain [[id number]] Collected:  12/13/17 0834    Order Status:  Completed Specimen:  Body Fluid from Ascites Updated:  12/13/17 1311     Gram Stain Result No WBC's      No organisms seen    Culture, Anaerobe [[id number]] Collected:  12/05/17 1657    Order Status:  Completed Specimen:  Body Fluid from Ascites Updated:  12/13/17 0746     Anaerobic Culture No anaerobes isolated    Gram stain [[id number]] Collected:  12/11/17 1447    Order Status:  Completed Specimen:  Body Fluid from Ascites Updated:  12/12/17 0137      Gram Stain Result Rare WBC's      No organisms seen    Culture, Anaerobe [[id number]]     Order Status:  Completed Specimen:  Body Fluid from Ascites     Fungus culture [[id number]]     Order Status:  Completed Specimen:  Body Fluid from Ascites     Gram stain [[id number]]     Order Status:  Completed Specimen:  Body Fluid from Ascites     Aerobic culture [[id number]]     Order Status:  Completed Specimen:  Body Fluid from Ascites     Urine culture [[id number]] Collected:  12/09/17 1101    Order Status:  Completed Specimen:  Urine from Urine, Clean Catch Updated:  12/10/17 1444     Urine Culture, Routine No significant growth    Aerobic culture [[id number]] Collected:  12/05/17 1657    Order Status:  Completed Specimen:  Body Fluid from Ascites Updated:  12/09/17 1259     Aerobic Bacterial Culture No growth          Significant Imaging: I have reviewed all pertinent imaging results/findings within the past 24 hours.

## 2017-12-16 NOTE — PROGRESS NOTES
[facility] NOTE    Jhonny Diana is a 27 y.o. male currently on hemodialysis for removal of uremic toxins and volume.    Labs have been reviewed and the dialysate bath has been adjusted.    There are no symptoms of hypotension, chest pain, dyspnea, nausea or vomiting.    Labs:        Recent Labs  Lab 12/14/17  0500 12/15/17  0400 12/16/17  0430   * 134* 134*   K 4.4 4.0 4.7   CL 95 101 100   CO2 28 23 23   BUN 31* 17 26*   CREATININE 4.7* 3.3* 4.6*   CALCIUM 9.0 8.6* 8.9   PHOS 4.7* 3.5 4.2         Recent Labs  Lab 12/14/17  0500 12/15/17  0400 12/15/17  0745 12/16/17  0430   WBC 2.95* 3.19*  --  3.13*   HGB 7.9* 7.6* 7.6* 7.5*   HCT 23.2* 22.8* 22.7* 22.4*    234  --  283       Assessment/Plan:    HD today for removal of uremic toxins and volume.

## 2017-12-16 NOTE — ASSESSMENT & PLAN NOTE
- 1 unit PRBC with HD 12/8/17  - 1 u PRBC with HD 12/12  - Discussed Fe studies with nephrology- will continue PRN transfusions as with minimal Fe stores  - transfuse 1 unit prbc with HD 12/16  - Monitor CBC daily

## 2017-12-16 NOTE — ASSESSMENT & PLAN NOTE
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 He was admitted for peritonitis from 11/24-11/30 for peritonitis.     Plan:   - LAURA on CKD 4 (baseline sCr 3.9 - 4.0). Likely instrinsic etiology, multifactorial secondary to sepsis vs. Toxins   - Seems like he has become RRT dependant for now no evidence of renal recovery  - Next HD tomorrow  - Target 1-2 lt as tolerated keep MAP > 65  - would recommend to maintain Hg > 8 for improved chance on renal recovery.  - Avoid nephrotoxic medication, renal dose medications and avoid contrast   - Renal diet  - EPO with HD

## 2017-12-16 NOTE — PROGRESS NOTES
[facility]  Nephrology  Progress Note    Patient Name: Jhonny Diana  MRN: [id number]  Admission Date: 12/3/2017  Hospital Length of Stay: 12 days  Attending Provider: Solis Chapa MD   Primary Care Physician: Primary Doctor No  Principal Problem:Fever    Subjective:     HPI: Mr. Diana is a 28yo man w/a history of alcoholic cirrhosis s/p DDLT 10/19/2017; c/b seizures (swtiched off prograf to cyclo), ATN requiring HD (last HD 11/17), superificial wound infection s/p wound vac to chevron incision, and recent admit for fevers on 11/21 (discharged on empiric augmentin for suspected superficial wound infection) and readmitted 11/24 again with fever. Found to have peritonitis (WBC 5000, 75% PNM) neg for bile leak. He was treated initially with vanc/cefepime. Repeat cell counts 11/29 with some improvement (WBC 1400, 45% PNM). Pt transitioned to augmentin upon d/c 11/30.      Pt presented to the ED this morning with subjective fever (100.5) and general malaise beginning at 8pm yesterday. His abdomen is soft, but diffusely tender. Labs reveal elevated WBC count to 12.7 from 6.5 in addition to elevated tbili 3.5 from 2.3 when compared to labs 3 days ago, despite being on augmentin for recent diagnosis of peritonitis. He denies nausea, vomiting, diarrhea, congestion, cough, CP or SOB. He has received 1 L bolus NS in the ED.    Interval History: NAEON. Peritoneal drain decreased out put to 65 ml overnight, UO ~ 40 ml/24hrs Net neg 882 ml/24hrs. He feel better except for drain causing some discomfort. Tolerated HD well TPN bag infusing.    Review of patient's allergies indicates:   Allergen Reactions    Bactrim [sulfamethoxazole-trimethoprim] Other (See Comments)     Mookie Trell Syndrome     Current Facility-Administered Medications   Medication Frequency    0.9%  NaCl infusion PRN    acetaminophen tablet 650 mg Q8H PRN    albumin human 25% bottle 25 g PRN    atovaquone suspension 1,500 mg Daily     bisacodyl suppository 10 mg Daily PRN    bisacodyl suppository 10 mg Once    ciprofloxacin HCl tablet 500 mg Daily    cycloSPORINE modified capsule 125 mg BID    docusate sodium capsule 100 mg TID PC    [START ON 12/16/2017] epoetin maurisio injection 10,000 Units Every Tues, Thurs, Sat    heparin (porcine) injection 1,000 Units PRN    heparin (porcine) injection 5,000 Units Q8H    isavuconazonium sulfate Cap 372 mg Daily    levETIRAcetam tablet 500 mg BID    levothyroxine tablet 75 mcg Before breakfast    metroNIDAZOLE tablet 500 mg Q8H    midodrine tablet 5 mg PRN    ondansetron disintegrating tablet 8 mg Q8H PRN    ondansetron injection 4 mg Q8H PRN    oxyCODONE immediate release tablet 10 mg Q3H PRN    oxyCODONE immediate release tablet 5 mg Q3H PRN    pantoprazole EC tablet 40 mg Daily    polyethylene glycol packet 17 g BID    ramelteon tablet 8 mg Nightly PRN    simethicone chewable tablet 80 mg TID PRN    sodium chloride 0.9% flush 3 mL PRN    ursodiol capsule 300 mg BID       Objective:     Vital Signs (Most Recent):  Temp: 99.8 °F (37.7 °C) (12/15/17 1658)  Pulse: 103 (12/15/17 1658)  Resp: 18 (12/15/17 1658)  BP: (!) 103/58 (12/15/17 1658)  SpO2: 96 % (12/15/17 1658)  O2 Device (Oxygen Therapy): room air (12/15/17 1658) Vital Signs (24h Range):  Temp:  [99 °F (37.2 °C)-100.3 °F (37.9 °C)] 99.8 °F (37.7 °C)  Pulse:  [] 103  Resp:  [16-18] 18  SpO2:  [95 %-100 %] 96 %  BP: (101-113)/(55-60) 103/58     Weight: 73.4 kg (161 lb 13.1 oz) (12/15/17 0408)  Body mass index is 26.12 kg/m².  Body surface area is 1.85 meters squared.    I/O last 3 completed shifts:  In: 3296.8 [P.O.:1600; Other:700; IV Piggyback:300]  Out: 3156 [Urine:111; Other:3045]    Physical Exam   Constitutional: He is oriented to person, place, and time. He has a sickly appearance. No distress.   HENT:   Head: Normocephalic and atraumatic.   Eyes: Pupils are equal, round, and reactive to light.   Neck: Normal range of  motion. Neck supple. No JVD present.   Cardiovascular: Regular rhythm.  Tachycardia present.  Exam reveals no gallop and no friction rub.    No murmur heard.  Pulmonary/Chest: Effort normal. He has decreased breath sounds in the right lower field and the left lower field. He has no wheezes. He has no rales.   Abdominal: Soft. He exhibits distension (mild distention ). There is no tenderness. There is no guarding.   Wound vac to incision. Positive ascitis new drain inlacdwithbag hooked to bedside   Musculoskeletal: He exhibits edema (1+ pitting edema in LE ).   Neurological: He is alert and oriented to person, place, and time.   Positive Asterixis, somnolent and nauseated   Skin: Skin is warm and dry. Capillary refill takes less than 2 seconds. He is not diaphoretic.   Psychiatric: His behavior is normal. He exhibits a depressed mood.   Nursing note and vitals reviewed.      Significant Labs:  ABGs: No results for input(s): PH, PCO2, HCO3, POCSATURATED, BE in the last 168 hours.  BMP:     Recent Labs  Lab 12/15/17  0400   GLU 88      CO2 23   BUN 17   CREATININE 3.3*   CALCIUM 8.6*   MG 2.0     CBC:     Recent Labs  Lab 12/15/17  0400 12/15/17  0745   WBC 3.19*  --    RBC 2.40*  --    HGB 7.6* 7.6*   HCT 22.8* 22.7*     --    MCV 95  --    MCH 31.7*  --    MCHC 33.3  --      CMP:     Recent Labs  Lab 12/15/17  0400   GLU 88   CALCIUM 8.6*   ALBUMIN 2.0*   PROT 5.2*   *   K 4.0   CO2 23      BUN 17   CREATININE 3.3*   ALKPHOS 85   ALT 5*   AST 17   BILITOT 2.1*     All labs within the past 24 hours have been reviewed.     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 He was admitted for peritonitis from 11/24-11/30 for peritonitis.     Plan:   - LAURA on CKD 4 (baseline sCr 3.9 - 4.0). Likely  instrinsic etiology, multifactorial secondary to sepsis vs. Toxins   - Seems like he has become RRT dependant for now no evidence of renal recovery  - Next HD tomorrow  - Target 1-2 lt as tolerated keep MAP > 65  - would recommend to maintain Hg > 8 for improved chance on renal recovery.  - Avoid nephrotoxic medication, renal dose medications and avoid contrast   - Renal diet  - EPO with HD            Thank you for your consult. I will follow-up with patient. Please contact us if you have any additional questions.    Krish Frausto MD  Nephrology  [facility]

## 2017-12-16 NOTE — ASSESSMENT & PLAN NOTE
-  Creatinine significantly elevated on 12/6 and HD resumed- was not on HD at admit as kidney function had improved.   - HD last 12/14.  - UOP has trended down daily and is now minimal.  - Cont strict I/O's.   - plan for HD today

## 2017-12-16 NOTE — PROGRESS NOTES
[facility]  Infectious Disease  Progress Note    Patient Name: Jhonny Diana  MRN: [id number]  Admission Date: 12/3/2017  Length of Stay: 12 days  Attending Physician: Solis Chapa MD  Primary Care Provider: Primary Doctor No    Isolation Status: No active isolations  Assessment/Plan:      Peritonitis     27-year-old male  - Alcoholic cirrhosis (c/b HE, EV, portal HTN, and HRS)  - s/p DDLT 10/19/2017, CMV D+/R+, steroid induction, on maintenance tacro/MMF/pred  - c/b seizures, transitioned from tacro to cyclo  - Superificial wound infection s/p wound vac to chevron   - Readmitted 11/21/2017 for suspected wound infection treated with amox-clav   - Readmitted 11/24/2017 with peritonitis (WBC 5000) discharged on amox-clav  - Biliary stricture s/p sphincterotomy, biliary stent placement in CBD 12/6/2017  - s/p abdominal drain placement 12/13/2017    Patient readmitted for ongoing nausea, vomiting, abdominal pain - reports symptoms have improved with biliary stent placement, antibiotics, and abdominal drain placement. Repeat ascites fluid analysis with resolution of peritonitis.    Recommendations:  - Have transitioned to ciprofloxacin and metronidazole for coverage, please continue.   - pt reporting some difficulty in swallowing medications, perhaps he would tolerate PO liquid formulations better.    - Continue isavuconazole for coverage of Candida and mold prophylaxis                 Anticipated Disposition:  As per primary team    Thank you for your consult. I will follow-up with patient. Please contact us if you have any additional questions.    Roman Jaimes MD, PhD  Infectious Disease, PGY-4  [facility]    Subjective:     Principal Problem:Fever    HPI: Case of 26 y/o male PMHx Asthma, Cirrosis 2ry to Etoh abuse, DDLT 10/19/17  Steroid induction, CMV D+/R+. Maintenance tacro/mmf/pred. Complicated with seizures, LAURA, superficial wound infection with wound vac to chevron and  peritonitis. Past admission include:     - 11/21/17 Wound infection with wound vac placement D/H on augmentin  - 11/24/17 admit for peritonitis with therapeutic and diagnostic paracentesis. D/H on Augmentin for 2 weeks        Admitted on 12/3/17 patient presented on this occasion to the ED with subjective fevers 100.5, general malaise since 2 days prior to evaluation. Had also diffuse tenderness of abdomen, nausea. Denied emesis, diarrhea, congestion, cough. On 12/6/17 ERCP was done and stent was place to correct stricture. At time had elevation of WBC of 12 from 6 and elevation of tbili with peak at 9. Both trending down but patient continues with general malaise and no improvement in symptoms since ERCP. ID consulted regarding fungal prophylaxis and antibiotic recommendations.    Interval History: Pt continues to endorse diffuse abdominal pain and fullness, also relating some difficulty in swallowing his ciprofloxacin and metronidazole    Review of Systems   Constitutional: Negative for chills and fever.   Respiratory: Negative for chest tightness and shortness of breath.    Cardiovascular: Negative for chest pain.   Gastrointestinal: Positive for abdominal pain. Negative for nausea and vomiting.     Objective:     Vital Signs (Most Recent):  Temp: 99.8 °F (37.7 °C) (12/15/17 1658)  Pulse: 103 (12/15/17 1658)  Resp: 18 (12/15/17 1658)  BP: (!) 103/58 (12/15/17 1658)  SpO2: 96 % (12/15/17 1658) Vital Signs (24h Range):  Temp:  [99 °F (37.2 °C)-100.3 °F (37.9 °C)] 99.8 °F (37.7 °C)  Pulse:  [] 103  Resp:  [16-18] 18  SpO2:  [95 %-100 %] 96 %  BP: (101-113)/(55-60) 103/58     Weight: 73.4 kg (161 lb 13.1 oz)  Body mass index is 26.12 kg/m².    Estimated Creatinine Clearance: 30.1 mL/min (based on SCr of 3.3 mg/dL (H)).    Physical Exam   Constitutional: He is oriented to person, place, and time. He appears well-developed and well-nourished.   HENT:   Head: Normocephalic and atraumatic.   Mouth/Throat:  Oropharynx is clear and moist.   Eyes: Pupils are equal, round, and reactive to light. No scleral icterus.   Neck: No JVD present.   Cardiovascular: Normal rate, regular rhythm and normal heart sounds.    Pulmonary/Chest: Effort normal.   Abdominal: He exhibits distension. There is tenderness. There is no rebound.   Musculoskeletal: He exhibits edema.   Lymphadenopathy:     He has no cervical adenopathy.   Neurological: He is oriented to person, place, and time.   Skin: Skin is warm and dry. Capillary refill takes less than 2 seconds.       Significant Labs:   CBC:     Recent Labs  Lab 12/14/17  0500 12/15/17  0400 12/15/17  0745   WBC 2.95* 3.19*  --    HGB 7.9* 7.6* 7.6*   HCT 23.2* 22.8* 22.7*    234  --      CMP:     Recent Labs  Lab 12/14/17  0500 12/15/17  0400   * 134*   K 4.4 4.0   CL 95 101   CO2 28 23   GLU 99 88   BUN 31* 17   CREATININE 4.7* 3.3*   CALCIUM 9.0 8.6*   PROT 5.0* 5.2*   ALBUMIN 2.0* 2.0*   BILITOT 2.1* 2.1*   ALKPHOS 85 85   AST 20 17   ALT <5* 5*   ANIONGAP 12 10   EGFRNONAA 15.7* 24.1*     Microbiology Results (last 7 days)     Procedure Component Value Units Date/Time    Aerobic culture [[id number]] Collected:  12/11/17 1447    Order Status:  Completed Specimen:  Body Fluid from Ascites Updated:  12/15/17 1223     Aerobic Bacterial Culture No growth    Fungus culture [[id number]] Collected:  12/11/17 1447    Order Status:  Completed Specimen:  Body Fluid from Ascites Updated:  12/14/17 1403     Fungus (Mycology) Culture Culture in progress    Culture, Anaerobe [[id number]] Collected:  12/11/17 1447    Order Status:  Completed Specimen:  Body Fluid from Ascites Updated:  12/14/17 1203     Anaerobic Culture Culture in progress    Blood culture [[id number]] Collected:  12/09/17 0830    Order Status:  Completed Specimen:  Blood Updated:  12/14/17 1012     Blood Culture, Routine No growth after 5 days.    Blood culture [[id number]] Collected:  12/09/17 0830    Order Status:  Completed  Specimen:  Blood Updated:  12/14/17 1012     Blood Culture, Routine No growth after 5 days.    Aerobic culture [[id number]] Collected:  12/13/17 0834    Order Status:  Completed Specimen:  Body Fluid from Ascites Updated:  12/14/17 0749     Aerobic Bacterial Culture No growth    Gram stain [[id number]] Collected:  12/13/17 0834    Order Status:  Completed Specimen:  Body Fluid from Ascites Updated:  12/13/17 1311     Gram Stain Result No WBC's      No organisms seen    Culture, Anaerobe [[id number]] Collected:  12/05/17 1657    Order Status:  Completed Specimen:  Body Fluid from Ascites Updated:  12/13/17 0746     Anaerobic Culture No anaerobes isolated    Gram stain [[id number]] Collected:  12/11/17 1447    Order Status:  Completed Specimen:  Body Fluid from Ascites Updated:  12/12/17 0137     Gram Stain Result Rare WBC's      No organisms seen    Culture, Anaerobe [[id number]]     Order Status:  Completed Specimen:  Body Fluid from Ascites     Fungus culture [[id number]]     Order Status:  Completed Specimen:  Body Fluid from Ascites     Gram stain [[id number]]     Order Status:  Completed Specimen:  Body Fluid from Ascites     Aerobic culture [[id number]]     Order Status:  Completed Specimen:  Body Fluid from Ascites     Urine culture [[id number]] Collected:  12/09/17 1101    Order Status:  Completed Specimen:  Urine from Urine, Clean Catch Updated:  12/10/17 1444     Urine Culture, Routine No significant growth    Aerobic culture [[id number]] Collected:  12/05/17 1657    Order Status:  Completed Specimen:  Body Fluid from Ascites Updated:  12/09/17 1259     Aerobic Bacterial Culture No growth          Significant Imaging: I have reviewed all pertinent imaging results/findings within the past 24 hours.

## 2017-12-16 NOTE — PROGRESS NOTES
[facility]  Infectious Disease  Plan of Care Note    Patient Name: Jhonny Diana  MRN: [id number]  Admission Date: 12/3/2017  Length of Stay: 13 days  Attending Physician: Solis Chapa MD  Primary Care Provider: Primary Doctor No    Isolation Status: No active isolations  Assessment/Plan:      No new Assessment & Plan notes have been filed under this hospital service since the last note was generated.  Service: Infectious Diseases    Attempted to see pt x 2, not in room. Chart reviewed, T max 100.3, WBC 3.1 with no bands. No new positive cultures, currently on PO ciprofloxacin and metronidazole. Please continue current regimen, will follow up with pt tomorrow.       Anticipated Disposition: As per primary team    Thank you for your consult. I will follow-up with patient. Please contact us if you have any additional questions.    Roman Jaimes MD, PhD  Infectious Disease  [facility]

## 2017-12-16 NOTE — ASSESSMENT & PLAN NOTE
- Diagnosed on previous admission.  Last para 11/29 with improving cell counts WBC 1400 with 45% PNMs   Was d/c'ed on augmentin, switched to vanc/cef at admit  - Repeat paracentesis 12/5/17, with  (improving from 1497)  - CT A/P repeated 12/9 with signif ascites  - Abx transitioned to Zosyn/Vanc and Isavu added 12/9 with signif clinical improvement  - Para 12/11 with 4.5 L removed - cell ct signif improved and negative for infection  - IR drain placed 1213 to drain remainder of ascites --> 2.5 L removed and replaced albumin 25% 100 cc x 2.  Cell count negative for infection  - discontinue zosyn/vanc/isavu, transitioned to oral cipro/flagyl 12/15/17  -repeat abd/liver u/s today to assess for fluid collections

## 2017-12-17 LAB
ALBUMIN SERPL BCP-MCNC: 1.9 G/DL
ALP SERPL-CCNC: 79 U/L
ALT SERPL W/O P-5'-P-CCNC: 5 U/L
ANION GAP SERPL CALC-SCNC: 12 MMOL/L
AST SERPL-CCNC: 21 U/L
BASOPHILS # BLD AUTO: 0.08 K/UL
BASOPHILS NFR BLD: 2.6 %
BILIRUB DIRECT SERPL-MCNC: 2 MG/DL
BILIRUB SERPL-MCNC: 2.5 MG/DL
BUN SERPL-MCNC: 13 MG/DL
CALCIUM SERPL-MCNC: 8.3 MG/DL
CHLORIDE SERPL-SCNC: 101 MMOL/L
CO2 SERPL-SCNC: 23 MMOL/L
CREAT SERPL-MCNC: 2.8 MG/DL
CYCLOSPORINE BLD LC/MS/MS-MCNC: 231 NG/ML
DIFFERENTIAL METHOD: ABNORMAL
EOSINOPHIL # BLD AUTO: 0 K/UL
EOSINOPHIL NFR BLD: 0.6 %
ERYTHROCYTE [DISTWIDTH] IN BLOOD BY AUTOMATED COUNT: 15.4 %
EST. GFR  (AFRICAN AMERICAN): 33.9 ML/MIN/1.73 M^2
EST. GFR  (NON AFRICAN AMERICAN): 29.4 ML/MIN/1.73 M^2
GLUCOSE SERPL-MCNC: 56 MG/DL
HCT VFR BLD AUTO: 25.3 %
HGB BLD-MCNC: 8.6 G/DL
IMM GRANULOCYTES # BLD AUTO: 0.09 K/UL
IMM GRANULOCYTES NFR BLD AUTO: 2.9 %
LYMPHOCYTES # BLD AUTO: 0.9 K/UL
LYMPHOCYTES NFR BLD: 28 %
MAGNESIUM SERPL-MCNC: 1.6 MG/DL
MCH RBC QN AUTO: 31.4 PG
MCHC RBC AUTO-ENTMCNC: 34 G/DL
MCV RBC AUTO: 92 FL
MONOCYTES # BLD AUTO: 0.9 K/UL
MONOCYTES NFR BLD: 28.6 %
NEUTROPHILS # BLD AUTO: 1.2 K/UL
NEUTROPHILS NFR BLD: 37.3 %
NRBC BLD-RTO: 0 /100 WBC
PHOSPHATE SERPL-MCNC: 2.9 MG/DL
PLATELET # BLD AUTO: 317 K/UL
PMV BLD AUTO: 9.3 FL
POTASSIUM SERPL-SCNC: 3.7 MMOL/L
PROT SERPL-MCNC: 5.1 G/DL
RBC # BLD AUTO: 2.74 M/UL
SODIUM SERPL-SCNC: 136 MMOL/L
WBC # BLD AUTO: 3.11 K/UL

## 2017-12-17 PROCEDURE — 25000003 PHARM REV CODE 250: Performed by: PHYSICIAN ASSISTANT

## 2017-12-17 PROCEDURE — 84100 ASSAY OF PHOSPHORUS: CPT

## 2017-12-17 PROCEDURE — 94664 DEMO&/EVAL PT USE INHALER: CPT

## 2017-12-17 PROCEDURE — 99900035 HC TECH TIME PER 15 MIN (STAT)

## 2017-12-17 PROCEDURE — 25000003 PHARM REV CODE 250: Performed by: NURSE PRACTITIONER

## 2017-12-17 PROCEDURE — 25000003 PHARM REV CODE 250: Performed by: STUDENT IN AN ORGANIZED HEALTH CARE EDUCATION/TRAINING PROGRAM

## 2017-12-17 PROCEDURE — 83735 ASSAY OF MAGNESIUM: CPT

## 2017-12-17 PROCEDURE — 63600175 PHARM REV CODE 636 W HCPCS: Performed by: PHYSICIAN ASSISTANT

## 2017-12-17 PROCEDURE — 99233 SBSQ HOSP IP/OBS HIGH 50: CPT | Mod: 24,,, | Performed by: NURSE PRACTITIONER

## 2017-12-17 PROCEDURE — 80053 COMPREHEN METABOLIC PANEL: CPT

## 2017-12-17 PROCEDURE — 94761 N-INVAS EAR/PLS OXIMETRY MLT: CPT

## 2017-12-17 PROCEDURE — 82248 BILIRUBIN DIRECT: CPT

## 2017-12-17 PROCEDURE — 94799 UNLISTED PULMONARY SVC/PX: CPT

## 2017-12-17 PROCEDURE — 85025 COMPLETE CBC W/AUTO DIFF WBC: CPT

## 2017-12-17 PROCEDURE — 80158 DRUG ASSAY CYCLOSPORINE: CPT

## 2017-12-17 PROCEDURE — 25000003 PHARM REV CODE 250: Performed by: TRANSPLANT SURGERY

## 2017-12-17 PROCEDURE — 20600001 HC STEP DOWN PRIVATE ROOM

## 2017-12-17 PROCEDURE — 99233 SBSQ HOSP IP/OBS HIGH 50: CPT | Mod: ,,, | Performed by: INTERNAL MEDICINE

## 2017-12-17 RX ORDER — BISACODYL 5 MG
10 TABLET, DELAYED RELEASE (ENTERIC COATED) ORAL NIGHTLY
Status: DISCONTINUED | OUTPATIENT
Start: 2017-12-17 | End: 2017-12-20 | Stop reason: HOSPADM

## 2017-12-17 RX ORDER — BISACODYL 10 MG
10 SUPPOSITORY, RECTAL RECTAL ONCE
Status: COMPLETED | OUTPATIENT
Start: 2017-12-17 | End: 2017-12-17

## 2017-12-17 RX ADMIN — OXYCODONE HYDROCHLORIDE 10 MG: 5 TABLET ORAL at 08:12

## 2017-12-17 RX ADMIN — LEVOTHYROXINE SODIUM 75 MCG: 50 TABLET ORAL at 05:12

## 2017-12-17 RX ADMIN — URSODIOL 300 MG: 300 CAPSULE ORAL at 08:12

## 2017-12-17 RX ADMIN — OXYCODONE HYDROCHLORIDE 10 MG: 5 TABLET ORAL at 11:12

## 2017-12-17 RX ADMIN — PANTOPRAZOLE SODIUM 40 MG: 40 TABLET, DELAYED RELEASE ORAL at 08:12

## 2017-12-17 RX ADMIN — BISACODYL 10 MG: 10 SUPPOSITORY RECTAL at 01:12

## 2017-12-17 RX ADMIN — URSODIOL 300 MG: 300 CAPSULE ORAL at 09:12

## 2017-12-17 RX ADMIN — OXYCODONE HYDROCHLORIDE 10 MG: 5 TABLET ORAL at 01:12

## 2017-12-17 RX ADMIN — METRONIDAZOLE 500 MG: 500 TABLET ORAL at 09:12

## 2017-12-17 RX ADMIN — CYCLOSPORINE 125 MG: 100 CAPSULE, LIQUID FILLED ORAL at 08:12

## 2017-12-17 RX ADMIN — ISAVUCONAZONIUM SULFATE 372 MG: 186 CAPSULE ORAL at 08:12

## 2017-12-17 RX ADMIN — HEPARIN SODIUM 5000 UNITS: 5000 INJECTION, SOLUTION INTRAVENOUS; SUBCUTANEOUS at 05:12

## 2017-12-17 RX ADMIN — OXYCODONE HYDROCHLORIDE 10 MG: 5 TABLET ORAL at 10:12

## 2017-12-17 RX ADMIN — CIPROFLOXACIN HYDROCHLORIDE 500 MG: 500 TABLET, FILM COATED ORAL at 08:12

## 2017-12-17 RX ADMIN — CYCLOSPORINE 125 MG: 100 CAPSULE, LIQUID FILLED ORAL at 05:12

## 2017-12-17 RX ADMIN — DOCUSATE SODIUM 100 MG: 100 CAPSULE, LIQUID FILLED ORAL at 03:12

## 2017-12-17 RX ADMIN — OXYCODONE HYDROCHLORIDE 10 MG: 5 TABLET ORAL at 07:12

## 2017-12-17 RX ADMIN — BISACODYL 10 MG: 5 TABLET, COATED ORAL at 09:12

## 2017-12-17 RX ADMIN — HEPARIN SODIUM 5000 UNITS: 5000 INJECTION, SOLUTION INTRAVENOUS; SUBCUTANEOUS at 09:12

## 2017-12-17 RX ADMIN — OXYCODONE HYDROCHLORIDE 10 MG: 5 TABLET ORAL at 03:12

## 2017-12-17 RX ADMIN — ATOVAQUONE 1500 MG: 750 SUSPENSION ORAL at 08:12

## 2017-12-17 RX ADMIN — METRONIDAZOLE 500 MG: 500 TABLET ORAL at 05:12

## 2017-12-17 RX ADMIN — DOCUSATE SODIUM 100 MG: 100 CAPSULE, LIQUID FILLED ORAL at 08:12

## 2017-12-17 RX ADMIN — HEPARIN SODIUM 5000 UNITS: 5000 INJECTION, SOLUTION INTRAVENOUS; SUBCUTANEOUS at 03:12

## 2017-12-17 RX ADMIN — LEVETIRACETAM 500 MG: 500 TABLET, FILM COATED ORAL at 08:12

## 2017-12-17 RX ADMIN — OXYCODONE HYDROCHLORIDE 10 MG: 5 TABLET ORAL at 04:12

## 2017-12-17 RX ADMIN — POLYETHYLENE GLYCOL 3350 17 G: 17 POWDER, FOR SOLUTION ORAL at 08:12

## 2017-12-17 RX ADMIN — METRONIDAZOLE 500 MG: 500 TABLET ORAL at 03:12

## 2017-12-17 RX ADMIN — LEVETIRACETAM 500 MG: 500 TABLET, FILM COATED ORAL at 09:12

## 2017-12-17 NOTE — PROGRESS NOTES
Dialysis complete.  Blood returned.    Right J  CVC flushed * 2 with saline and heparin      Locked with cap and tape.  No s/s infection at cvc site.   Pt tolerated hemodialysis without diff.  Pt ran  4  Hrs on hemodialysis machine.   Took off 2000  Ml net volume.      Used F-160 dialyzer.

## 2017-12-17 NOTE — ASSESSMENT & PLAN NOTE
- Pt malnourished with poor PO intake  - SHANE tube placed 12/8/17 and TF started  - Not tolerating TF --> central line placed and TPN started 12/9  - Remove shane 12/11  - starting appetite stimulant

## 2017-12-17 NOTE — PROGRESS NOTES
[facility]  Liver Transplant  Progress Note    Patient Name: Jhonny Diana  MRN: [id number]  Admission Date: 12/3/2017  Hospital Length of Stay: 14 days  Code Status: Full Code  Primary Care Provider: Primary Doctor No  Post-Operative Day: 59    ORGAN:   LIVER  Disease Etiology: Acute Alcoholic Hepatitis  Donor Type:    - Brain Death  CDC High Risk:   No  Donor CMV Status:   Donor CMV Status: Positive  Donor HBcAB:   Negative  Donor HCV Status:   Negative  Whole or Partial: Whole Liver  Biliary Anastomosis: End to End  Arterial Anatomy: Standard  Subjective:     History of Present Illness:  Mr. Diana is a 26yo man w/a history of alcoholic cirrhosis s/p DDLT 10/19/2017; c/b seizures (swtiched off prograf to cyclo), ATN requiring HD (last HD ), superificial wound infection s/p wound vac to chevron incision, and recent admit for fevers on  (discharged on empiric augmentin for suspected superficial wound infection) and readmitted  again with fever. Found to have peritonitis (WBC 5000, 75% PNM) neg for bile leak. He was treated initially with vanc/cefepime. Repeat cell counts  with some improvement (WBC 1400, 45% PNM). Pt transitioned to augmentin upon d/c .      Pt presented to the ED 12/3 morning with subjective fever (100.5) and general malaise beginning at 8pm day prior. His abdomen soft, but diffusely tender. Labs reveal elevated WBC count to 12.7 from 6.5 in addition to elevated tbili 3.5 from 2.3 when compared to labs 3 days ago, despite being on augmentin for recent diagnosis of peritonitis. He denied nausea, vomiting, diarrhea, congestion, cough, CP or SOB. He received 1 L bolus NS in the ED. Infectious mosley intiated, BS abx (vanc/cef) started, and obtained CT abdomen.     He has a wound vac in place.     Hospital Course:  Interval History:      : Pt in slightly better spirits today, joking with team. Pt remains with abdominal pain which is improving as  peritonitis continues to resolve (last para ). Bili trending down at 5.2 today, cont ursodiol. PO intake remains poor, will place SHANE tube and initiate tube feeds today, orders placed. Will give albumin x1. Per nephrology will plan for HD today. H/H low on morning labs will plan for 1 unit PRBC with HD. Of note, TSH checked yesterday, level of 11.4 with T4 <3.0, endo consulted- will start levothyroxine today. Cont to encourage PO intake and ambulation. PT ordered 12/7/17, unable to see patient today - will work with pt this weekend. Monitor.     12/9:  Pt with signif nausea and abd pain this AM.  D/c TF.  Place central line and will initiate TPN.  CT A/P obtained today with signif ascites.  Plan IR vs washout in OR.  ID consulted and recommend washout.  Pt transitioned to Zosyn and started on isavu.  Will change PO meds to shane as much as able.      12/10:  Pt with signif improvement today in nausea and feels hungry.  CT A/P 12/9 with signif ascites.  Plan for IR para with drain placement in AM.  Continue Zosyn/Vanc/Isavu.  Will reorder TPN.  D/c shane in AM if not going to transition back to TF.    12/11:  D/c shane.  Continue TPN.  Nausea still improved.  Continue current abx.  Para today with 4.5 L removed.  HD cancelled.  Ascitic fluid labs pending.  Pt NPO after midnight.  Blood/urine cx 12/9 NGTD.    12/12:  Cell count negative from para yesterday.  Pt without nausea today- was able to eat a small amount.  Calorie count initiated.  Plan for IR drain placement tomorrow.  HD today with blood transfusion.    12/13:  Drain placed in IR this AM with WBC/diff negative for infection.  2.5 L total removed- replaced with albumin 25% 100 cc.  Will type and screen tomorrow and tx if < 8 in HD.  Remains slightly tachy/hypotensive but improved.  Eating minimally- reorder TPN.      12/14: no acute events over night.  Drain output decreased this am.  Reports eating better per patient and mother.  Continue current abx &  "tpn.    12/15: HD 12/14.  Wound vac dressing changed today.  Continue to encourage oral intake.  Stop TPN.  Transition to oral antibiotics today per ID recs. Likely will need transfusion with HD in am.    12/16/17: Transfuse 1 unit prbc with hd today.  No acute events over night.  Continues to report "feeling fullness."  Will obtain abdominal/liver ultrasound to assess for fluid collections.    12/17:  Blood counts responded appropriately to transfusion.  Possible IR drainage in am for fluid collection noted on ultrasound.  Obtain KUB to assess for ileus.  Consider starting appetite stimulant.    Scheduled Meds:   atovaquone  1,500 mg Oral Daily    bisacodyl  10 mg Rectal Once    ciprofloxacin HCl  500 mg Oral Daily    cycloSPORINE modified (NEORAL)  125 mg Oral BID    docusate sodium  100 mg Oral TID PC    epoetin maurisio (PROCRIT) injection  10,000 Units Intravenous Every Tues, Thurs, Sat    heparin (porcine)  5,000 Units Subcutaneous Q8H    isavuconazonium sulfate  372 mg Oral Daily    levETIRAcetam  500 mg Oral BID    levothyroxine  75 mcg Oral Before breakfast    metroNIDAZOLE  500 mg Oral Q8H    pantoprazole  40 mg Oral Daily    polyethylene glycol  17 g Oral BID    ursodiol  300 mg Oral BID     Continuous Infusions:    PRN Meds:sodium chloride, acetaminophen, albumin human 25%, bisacodyl, heparin (porcine), midodrine, ondansetron, ondansetron, oxyCODONE, oxyCODONE, ramelteon, simethicone, sodium chloride 0.9%    Review of Systems   Constitutional: Positive for activity change and appetite change. Negative for chills, fatigue and fever.   Respiratory: Negative for cough, shortness of breath and wheezing.    Cardiovascular: Positive for leg swelling.   Gastrointestinal: Positive for abdominal distention, abdominal pain and constipation. Negative for nausea and vomiting.   Genitourinary: Negative for difficulty urinating and dysuria.   Skin: Positive for wound.   Allergic/Immunologic: Positive for " immunocompromised state.   Neurological: Positive for weakness.   Psychiatric/Behavioral: Negative for confusion and decreased concentration. The patient is not nervous/anxious.      Objective:     Vital Signs (Most Recent):  Temp: 99.2 °F (37.3 °C) (12/17/17 0732)  Pulse: 110 (12/17/17 0732)  Resp: 18 (12/17/17 0732)  BP: (!) 114/57 (12/17/17 0732)  SpO2: 97 % (12/17/17 0732) Vital Signs (24h Range):  Temp:  [99 °F (37.2 °C)-99.9 °F (37.7 °C)] 99.2 °F (37.3 °C)  Pulse:  [] 110  Resp:  [15-20] 18  SpO2:  [95 %-98 %] 97 %  BP: ()/(51-73) 114/57     Weight: 72.1 kg (158 lb 15.2 oz)  Body mass index is 25.66 kg/m².    Intake/Output - Last 3 Shifts       12/15 0700 - 12/16 0659 12/16 0700 - 12/17 0659 12/17 0700 - 12/18 0659    P.O. 80 880     I.V. (mL/kg)       Other 200      IV Piggyback       .9      Total Intake(mL/kg) 1246.9 (17.1) 880 (12.2)     Urine (mL/kg/hr)  25 (0)     Emesis/NG output       Other  50 (0)     Stool  0 (0)     Blood       Total Output   75      Net +1246.9 +805             Urine Occurrence  0 x     Stool Occurrence  0 x           Physical Exam   Constitutional: He is oriented to person, place, and time. He appears well-developed. No distress.   Temporal and distal extremity muscle wasting   HENT:   Head: Normocephalic and atraumatic.   Eyes: No scleral icterus.   Neck: Normal range of motion. Neck supple. No JVD present.   Cardiovascular: Normal rate, regular rhythm, normal heart sounds and intact distal pulses.    No murmur heard.  Pulmonary/Chest: Effort normal. No respiratory distress. He has decreased breath sounds in the right middle field, the right lower field and the left lower field. He has no wheezes. He exhibits no tenderness.   Abdominal: Soft. He exhibits distension. There is tenderness (diffuse). There is no rebound and no guarding.   Wound vac to incision   Musculoskeletal: Normal range of motion. He exhibits edema (2+ LE edema). He exhibits no tenderness.    Neurological: He is alert and oriented to person, place, and time. He has normal reflexes.   Skin: Skin is warm and dry. He is not diaphoretic.   Psychiatric: His behavior is normal. Judgment and thought content normal. His mood appears anxious.   Pt anxious and expressing feeling overwhelmed   Nursing note and vitals reviewed.      Laboratory:  Immunosuppressants         Stop Route Frequency     cycloSPORINE modified capsule 125 mg      -- Oral 2 times daily        CBC:     Recent Labs  Lab 12/17/17  0540   WBC 3.11*   RBC 2.74*   HGB 8.6*   HCT 25.3*      MCV 92   MCH 31.4*   MCHC 34.0     CMP:     Recent Labs  Lab 12/17/17  0540   GLU 56*   CALCIUM 8.3*   ALBUMIN 1.9*   PROT 5.1*      K 3.7   CO2 23      BUN 13   CREATININE 2.8*   ALKPHOS 79   ALT 5*   AST 21   BILITOT 2.5*     Coagulation:     Recent Labs  Lab 12/11/17  0500   INR 1.1     Cardiac Markers: No results for input(s): CKMB, TROPONINT, MYOGLOBIN in the last 168 hours.  Labs within the past 24 hours have been reviewed.    Diagnostic Results:  I have personally reviewed all pertinent imaging studies.    Assessment/Plan:     * Fever    - See peritonitis  - Blood/urine cx repeated 12/9 NGTD          Peritonitis    - Diagnosed on previous admission.  Last para 11/29 with improving cell counts WBC 1400 with 45% PNMs   Was d/c'ed on augmentin, switched to vanc/cef at admit  - Repeat paracentesis 12/5/17, with  (improving from 1497)  - CT A/P repeated 12/9 with signif ascites  - Abx transitioned to Zosyn/Vanc and Isavu added 12/9 with signif clinical improvement  - Para 12/11 with 4.5 L removed - cell ct signif improved and negative for infection  - IR drain placed 1213 to drain remainder of ascites --> 2.5 L removed and replaced albumin 25% 100 cc x 2.  Cell count negative for infection  - discontinue zosyn/vanc/isavu, transitioned to oral cipro/flagyl 12/15/17  -repeat abd/liver u/s 12/16 with undrained fluid collection, send to  IR in am for drainage            LAURA (acute kidney injury)    -  Creatinine significantly elevated on 12/6 and HD resumed- was not on HD at admit as kidney function had improved.   - HD last 12/16.  - UOP has trended down daily and is now minimal.  - Cont strict I/O's.             Liver transplanted    - A/w fevers and hyperbilirubinemia.   - LFTs improving.  - PBS consulted. ERCP attempted on 12/4 but unable to cannulate. MRCP on 12/4 noted with stricture.  - ERCP 12/6/17  With post-anastamosis stricture, stent placed.   - Para 12/5/17, 3L removed ( down from 1497, cr 5.2, bili 2.5)          Delayed surgical wound healing    - Wound vac in place, last changed 12/15 and healing well  - wound care following        Long-term use of immunosuppressant medication    - Continue cyclosporine  - Hold Cellcept for infection  - Will monitor for signs of toxic side effects, check daily Cyclosporine troughs, and change meds accordingly.        Prophylactic immunotherapy    - See long term immunosuppressant          At risk for opportunistic infections    - hold valcyte as wbc low, check cmv pcr (weekly surveillance)  - Continue Bactrim for PCP proph          Biliary stricture of transplanted liver    - Elevated t bili post liver txp in setting of abdominal pain, decreased appetitive and intermittent nausea.  PBS consulted to assess duct per ERCP.  - ERCP performed 12/5 PBS unable to cannulate. MRCP with stricture.  - ERCP 12/6/17 with post-anastamosis stricture with stent placed.   - Tbili peaked at 9.1, now trending down.   -started ursodiol 12/7/17.   - T bili has remained around 2 for last several days --> monitor closely.        Seizure    - Cont keppra          Severe malnutrition    - Pt malnourished with poor PO intake  - DYLAN tube placed 12/8/17 and TF started  - Not tolerating TF --> central line placed and TPN started 12/9  - Remove dylan 12/11  - consider starting appetite stimulant        Hypothyroidism    -TSH  11.4  -T4 ,3.0  - Endo consulted. Start levothyroxine 75mcg PO. Instructions for administration given. Appreciate recs.           Anemia of chronic disease    - 1 unit PRBC with HD 12/8/17  - 1 u PRBC with HD 12/12  - Discussed Fe studies with nephrology- will continue PRN transfusions as with minimal Fe stores  - transfused 1 unit prbc with HD 12/16  - Monitor CBC daily              VTE Risk Mitigation         Ordered     heparin (porcine) injection 5,000 Units  Every 8 hours     Route:  Subcutaneous        12/11/17 1126     heparin (porcine) injection 1,000 Units  As needed (PRN)     Route:  Intra-Catheter        12/08/17 1736     Medium Risk of VTE  Once      12/03/17 0249     Place sequential compression device  Until discontinued      12/03/17 0249          The patients clinical status was discussed at multidisplinary rounds, involving transplant surgery, transplant medicine, pharmacy, nursing, nutrition, and social work    Discharge Planning: not candidate at this time  Monitor kidney function --> may need outpt HD  Monitor HH needs vs rehab for deconditioned status      Seda Camacho NP  Liver Transplant  [facility]

## 2017-12-17 NOTE — PLAN OF CARE
Problem: Patient Care Overview  Goal: Plan of Care Review  Outcome: Ongoing (interventions implemented as appropriate)  Pt remained free from injury during shift. VSS. Tmax 100.1. PO cipro and flagyl given as ordered. Pt c/o abdominal pain. 10mg Oxycodone given PRN. Abdominal xray performed in am. Suppository ordered and given. Wound vac remains in place putting out minimal serosanguinous output. Bili bag output 25cc during shift. Pt running NS-ST on tele. Appetite okay. Pt tentatively scheduled to go to IR for drainage tomorrow. Call light in reach. Will continue to monitor.

## 2017-12-17 NOTE — PROGRESS NOTES
Pt arrived via stretcher.  Placed on cardiac monitor and b/p check every 15 minutes.  Right IJ Dialysis access prep with prevantic swab.  maintenace.  Hemodialysis started per orders.

## 2017-12-17 NOTE — ASSESSMENT & PLAN NOTE
- Diagnosed on previous admission.  Last para 11/29 with improving cell counts WBC 1400 with 45% PNMs   Was d/c'ed on augmentin, switched to vanc/cef at admit  - Repeat paracentesis 12/5/17, with  (improving from 1497)  - CT A/P repeated 12/9 with signif ascites  - Abx transitioned to Zosyn/Vanc and Isavu added 12/9 with signif clinical improvement  - Para 12/11 with 4.5 L removed - cell ct signif improved and negative for infection  - IR drain placed 1213 to drain remainder of ascites --> 2.5 L removed and replaced albumin 25% 100 cc x 2.  Cell count negative for infection  - discontinue zosyn/vanc/isavu, transitioned to oral cipro/flagyl 12/15/17  -repeat abd/liver u/s 12/16 with undrained fluid collection, send to IR in am for drainage

## 2017-12-17 NOTE — ASSESSMENT & PLAN NOTE
- 1 unit PRBC with HD 12/8/17  - 1 u PRBC with HD 12/12  - Discussed Fe studies with nephrology- will continue PRN transfusions as with minimal Fe stores  - transfused 1 unit prbc with HD 12/16  - Monitor CBC daily

## 2017-12-17 NOTE — SUBJECTIVE & OBJECTIVE
Scheduled Meds:   atovaquone  1,500 mg Oral Daily    bisacodyl  10 mg Rectal Once    ciprofloxacin HCl  500 mg Oral Daily    cycloSPORINE modified (NEORAL)  125 mg Oral BID    docusate sodium  100 mg Oral TID PC    epoetin maurisio (PROCRIT) injection  10,000 Units Intravenous Every Tues, Thurs, Sat    heparin (porcine)  5,000 Units Subcutaneous Q8H    isavuconazonium sulfate  372 mg Oral Daily    levETIRAcetam  500 mg Oral BID    levothyroxine  75 mcg Oral Before breakfast    metroNIDAZOLE  500 mg Oral Q8H    pantoprazole  40 mg Oral Daily    polyethylene glycol  17 g Oral BID    ursodiol  300 mg Oral BID     Continuous Infusions:    PRN Meds:sodium chloride, acetaminophen, albumin human 25%, bisacodyl, heparin (porcine), midodrine, ondansetron, ondansetron, oxyCODONE, oxyCODONE, ramelteon, simethicone, sodium chloride 0.9%    Review of Systems   Constitutional: Positive for activity change and appetite change. Negative for chills, fatigue and fever.   Respiratory: Negative for cough, shortness of breath and wheezing.    Cardiovascular: Positive for leg swelling.   Gastrointestinal: Positive for abdominal distention, abdominal pain and constipation. Negative for nausea and vomiting.   Genitourinary: Negative for difficulty urinating and dysuria.   Skin: Positive for wound.   Allergic/Immunologic: Positive for immunocompromised state.   Neurological: Positive for weakness.   Psychiatric/Behavioral: Negative for confusion and decreased concentration. The patient is not nervous/anxious.      Objective:     Vital Signs (Most Recent):  Temp: 99.2 °F (37.3 °C) (12/17/17 0732)  Pulse: 110 (12/17/17 0732)  Resp: 18 (12/17/17 0732)  BP: (!) 114/57 (12/17/17 0732)  SpO2: 97 % (12/17/17 0732) Vital Signs (24h Range):  Temp:  [99 °F (37.2 °C)-99.9 °F (37.7 °C)] 99.2 °F (37.3 °C)  Pulse:  [] 110  Resp:  [15-20] 18  SpO2:  [95 %-98 %] 97 %  BP: ()/(51-73) 114/57     Weight: 72.1 kg (158 lb 15.2 oz)  Body  mass index is 25.66 kg/m².    Intake/Output - Last 3 Shifts       12/15 0700 - 12/16 0659 12/16 0700 - 12/17 0659 12/17 0700 - 12/18 0659    P.O. 80 880     I.V. (mL/kg)       Other 200      IV Piggyback       .9      Total Intake(mL/kg) 1246.9 (17.1) 880 (12.2)     Urine (mL/kg/hr)  25 (0)     Emesis/NG output       Other  50 (0)     Stool  0 (0)     Blood       Total Output   75      Net +1246.9 +805             Urine Occurrence  0 x     Stool Occurrence  0 x           Physical Exam   Constitutional: He is oriented to person, place, and time. He appears well-developed. No distress.   Temporal and distal extremity muscle wasting   HENT:   Head: Normocephalic and atraumatic.   Eyes: No scleral icterus.   Neck: Normal range of motion. Neck supple. No JVD present.   Cardiovascular: Normal rate, regular rhythm, normal heart sounds and intact distal pulses.    No murmur heard.  Pulmonary/Chest: Effort normal. No respiratory distress. He has decreased breath sounds in the right middle field, the right lower field and the left lower field. He has no wheezes. He exhibits no tenderness.   Abdominal: Soft. He exhibits distension. There is tenderness (diffuse). There is no rebound and no guarding.   Wound vac to incision   Musculoskeletal: Normal range of motion. He exhibits edema (2+ LE edema). He exhibits no tenderness.   Neurological: He is alert and oriented to person, place, and time. He has normal reflexes.   Skin: Skin is warm and dry. He is not diaphoretic.   Psychiatric: His behavior is normal. Judgment and thought content normal. His mood appears anxious.   Pt anxious and expressing feeling overwhelmed   Nursing note and vitals reviewed.      Laboratory:  Immunosuppressants         Stop Route Frequency     cycloSPORINE modified capsule 125 mg      -- Oral 2 times daily        CBC:     Recent Labs  Lab 12/17/17  0540   WBC 3.11*   RBC 2.74*   HGB 8.6*   HCT 25.3*      MCV 92   MCH 31.4*   MCHC 34.0      CMP:     Recent Labs  Lab 12/17/17  0540   GLU 56*   CALCIUM 8.3*   ALBUMIN 1.9*   PROT 5.1*      K 3.7   CO2 23      BUN 13   CREATININE 2.8*   ALKPHOS 79   ALT 5*   AST 21   BILITOT 2.5*     Coagulation:     Recent Labs  Lab 12/11/17  0500   INR 1.1     Cardiac Markers: No results for input(s): CKMB, TROPONINT, MYOGLOBIN in the last 168 hours.  Labs within the past 24 hours have been reviewed.    Diagnostic Results:  I have personally reviewed all pertinent imaging studies.

## 2017-12-17 NOTE — ASSESSMENT & PLAN NOTE
-  Creatinine significantly elevated on 12/6 and HD resumed- was not on HD at admit as kidney function had improved.   - HD last 12/16.  - UOP has trended down daily and is now minimal.  - Cont strict I/O's.

## 2017-12-18 LAB
ALBUMIN SERPL BCP-MCNC: 1.9 G/DL
ALP SERPL-CCNC: 118 U/L
ALT SERPL W/O P-5'-P-CCNC: 6 U/L
ANION GAP SERPL CALC-SCNC: 12 MMOL/L
ANISOCYTOSIS BLD QL SMEAR: SLIGHT
APPEARANCE FLD: NORMAL
AST SERPL-CCNC: 20 U/L
BACTERIA SPEC ANAEROBE CULT: NORMAL
BASOPHILS # BLD AUTO: ABNORMAL K/UL
BASOPHILS NFR BLD: 4 %
BILIRUB FLD-MCNC: 1.5 MG/DL
BILIRUB SERPL-MCNC: 2.5 MG/DL
BODY FLD TYPE: NORMAL
BODY FLUID SOURCE, BILIRUBIN: NORMAL
BUN SERPL-MCNC: 18 MG/DL
CALCIUM SERPL-MCNC: 8.7 MG/DL
CHLORIDE SERPL-SCNC: 101 MMOL/L
CO2 SERPL-SCNC: 22 MMOL/L
COLOR FLD: YELLOW
CREAT SERPL-MCNC: 4.2 MG/DL
CYCLOSPORINE BLD LC/MS/MS-MCNC: 190 NG/ML
DIFFERENTIAL METHOD: ABNORMAL
EOSINOPHIL # BLD AUTO: ABNORMAL K/UL
EOSINOPHIL NFR BLD: 1 %
EOSINOPHIL NFR FLD MANUAL: 2 %
ERYTHROCYTE [DISTWIDTH] IN BLOOD BY AUTOMATED COUNT: 15 %
EST. GFR  (AFRICAN AMERICAN): 20.8 ML/MIN/1.73 M^2
EST. GFR  (NON AFRICAN AMERICAN): 18 ML/MIN/1.73 M^2
GLUCOSE SERPL-MCNC: 69 MG/DL
GRAM STN SPEC: NORMAL
GRAM STN SPEC: NORMAL
HCT VFR BLD AUTO: 25.2 %
HGB BLD-MCNC: 8.5 G/DL
IMM GRANULOCYTES # BLD AUTO: ABNORMAL K/UL
IMM GRANULOCYTES NFR BLD AUTO: ABNORMAL %
LYMPHOCYTES # BLD AUTO: ABNORMAL K/UL
LYMPHOCYTES NFR BLD: 31 %
LYMPHOCYTES NFR FLD MANUAL: 29 %
MAGNESIUM SERPL-MCNC: 1.7 MG/DL
MCH RBC QN AUTO: 31 PG
MCHC RBC AUTO-ENTMCNC: 33.7 G/DL
MCV RBC AUTO: 92 FL
MONOCYTES # BLD AUTO: ABNORMAL K/UL
MONOCYTES NFR BLD: 13 %
MONOS+MACROS NFR FLD MANUAL: 54 %
NEUTROPHILS NFR BLD: 51 %
NEUTROPHILS NFR FLD MANUAL: 15 %
NRBC BLD-RTO: 0 /100 WBC
PHOSPHATE SERPL-MCNC: 4.5 MG/DL
PLATELET # BLD AUTO: 321 K/UL
PLATELET BLD QL SMEAR: ABNORMAL
PMV BLD AUTO: 9.1 FL
POLYCHROMASIA BLD QL SMEAR: ABNORMAL
POTASSIUM SERPL-SCNC: 3.8 MMOL/L
PREALB SERPL-MCNC: 11 MG/DL
PROT SERPL-MCNC: 4.9 G/DL
RBC # BLD AUTO: 2.74 M/UL
SODIUM SERPL-SCNC: 135 MMOL/L
WBC # BLD AUTO: 2.9 K/UL
WBC # FLD: 9 /CU MM

## 2017-12-18 PROCEDURE — 80158 DRUG ASSAY CYCLOSPORINE: CPT

## 2017-12-18 PROCEDURE — 25000003 PHARM REV CODE 250: Performed by: NURSE PRACTITIONER

## 2017-12-18 PROCEDURE — 63600175 PHARM REV CODE 636 W HCPCS: Performed by: PHYSICIAN ASSISTANT

## 2017-12-18 PROCEDURE — 99233 SBSQ HOSP IP/OBS HIGH 50: CPT | Mod: 24,,, | Performed by: NURSE PRACTITIONER

## 2017-12-18 PROCEDURE — 85007 BL SMEAR W/DIFF WBC COUNT: CPT

## 2017-12-18 PROCEDURE — 20600001 HC STEP DOWN PRIVATE ROOM

## 2017-12-18 PROCEDURE — 63600175 PHARM REV CODE 636 W HCPCS: Performed by: RADIOLOGY

## 2017-12-18 PROCEDURE — 99233 SBSQ HOSP IP/OBS HIGH 50: CPT | Mod: ,,, | Performed by: INTERNAL MEDICINE

## 2017-12-18 PROCEDURE — 80053 COMPREHEN METABOLIC PANEL: CPT

## 2017-12-18 PROCEDURE — 85027 COMPLETE CBC AUTOMATED: CPT

## 2017-12-18 PROCEDURE — 89051 BODY FLUID CELL COUNT: CPT

## 2017-12-18 PROCEDURE — 25000003 PHARM REV CODE 250: Performed by: STUDENT IN AN ORGANIZED HEALTH CARE EDUCATION/TRAINING PROGRAM

## 2017-12-18 PROCEDURE — 25000003 PHARM REV CODE 250: Performed by: PHYSICIAN ASSISTANT

## 2017-12-18 PROCEDURE — 84100 ASSAY OF PHOSPHORUS: CPT

## 2017-12-18 PROCEDURE — 87102 FUNGUS ISOLATION CULTURE: CPT

## 2017-12-18 PROCEDURE — 87075 CULTR BACTERIA EXCEPT BLOOD: CPT

## 2017-12-18 PROCEDURE — 63600175 PHARM REV CODE 636 W HCPCS: Performed by: NURSE PRACTITIONER

## 2017-12-18 PROCEDURE — 87205 SMEAR GRAM STAIN: CPT

## 2017-12-18 PROCEDURE — 0W9G30Z DRAINAGE OF PERITONEAL CAVITY WITH DRAINAGE DEVICE, PERCUTANEOUS APPROACH: ICD-10-PCS | Performed by: RADIOLOGY

## 2017-12-18 PROCEDURE — 84134 ASSAY OF PREALBUMIN: CPT

## 2017-12-18 PROCEDURE — 25000003 PHARM REV CODE 250: Performed by: TRANSPLANT SURGERY

## 2017-12-18 PROCEDURE — P9047 ALBUMIN (HUMAN), 25%, 50ML: HCPCS | Performed by: NURSE PRACTITIONER

## 2017-12-18 PROCEDURE — 83735 ASSAY OF MAGNESIUM: CPT

## 2017-12-18 PROCEDURE — 82247 BILIRUBIN TOTAL: CPT

## 2017-12-18 PROCEDURE — 87070 CULTURE OTHR SPECIMN AEROBIC: CPT

## 2017-12-18 RX ORDER — HYDROMORPHONE HYDROCHLORIDE 1 MG/ML
1 INJECTION, SOLUTION INTRAMUSCULAR; INTRAVENOUS; SUBCUTANEOUS ONCE
Status: COMPLETED | OUTPATIENT
Start: 2017-12-18 | End: 2017-12-18

## 2017-12-18 RX ORDER — FENTANYL CITRATE 50 UG/ML
INJECTION, SOLUTION INTRAMUSCULAR; INTRAVENOUS CODE/TRAUMA/SEDATION MEDICATION
Status: DISCONTINUED | OUTPATIENT
Start: 2017-12-18 | End: 2017-12-20 | Stop reason: HOSPADM

## 2017-12-18 RX ORDER — METRONIDAZOLE 500 MG/1
500 TABLET ORAL EVERY 8 HOURS
Status: DISCONTINUED | OUTPATIENT
Start: 2017-12-18 | End: 2017-12-20 | Stop reason: HOSPADM

## 2017-12-18 RX ORDER — HYDROMORPHONE HYDROCHLORIDE 1 MG/ML
0.5 INJECTION, SOLUTION INTRAMUSCULAR; INTRAVENOUS; SUBCUTANEOUS ONCE
Status: COMPLETED | OUTPATIENT
Start: 2017-12-18 | End: 2017-12-18

## 2017-12-18 RX ORDER — MUPIROCIN 20 MG/G
OINTMENT TOPICAL 2 TIMES DAILY
Status: DISCONTINUED | OUTPATIENT
Start: 2017-12-18 | End: 2017-12-20 | Stop reason: HOSPADM

## 2017-12-18 RX ORDER — ALBUMIN HUMAN 250 G/1000ML
25 SOLUTION INTRAVENOUS EVERY 8 HOURS
Status: COMPLETED | OUTPATIENT
Start: 2017-12-18 | End: 2017-12-19

## 2017-12-18 RX ORDER — CIPROFLOXACIN 500 MG/1
500 TABLET ORAL EVERY 24 HOURS
Status: DISCONTINUED | OUTPATIENT
Start: 2017-12-19 | End: 2017-12-20 | Stop reason: HOSPADM

## 2017-12-18 RX ORDER — MIRTAZAPINE 7.5 MG/1
7.5 TABLET, FILM COATED ORAL NIGHTLY
Status: DISCONTINUED | OUTPATIENT
Start: 2017-12-18 | End: 2017-12-20 | Stop reason: HOSPADM

## 2017-12-18 RX ORDER — MIDAZOLAM HYDROCHLORIDE 1 MG/ML
INJECTION INTRAMUSCULAR; INTRAVENOUS CODE/TRAUMA/SEDATION MEDICATION
Status: DISCONTINUED | OUTPATIENT
Start: 2017-12-18 | End: 2017-12-20 | Stop reason: HOSPADM

## 2017-12-18 RX ADMIN — URSODIOL 300 MG: 300 CAPSULE ORAL at 08:12

## 2017-12-18 RX ADMIN — MIDAZOLAM HYDROCHLORIDE 1 MG: 1 INJECTION, SOLUTION INTRAMUSCULAR; INTRAVENOUS at 11:12

## 2017-12-18 RX ADMIN — MIRTAZAPINE 7.5 MG: 7.5 TABLET ORAL at 08:12

## 2017-12-18 RX ADMIN — OXYCODONE HYDROCHLORIDE 10 MG: 5 TABLET ORAL at 12:12

## 2017-12-18 RX ADMIN — HEPARIN SODIUM 5000 UNITS: 5000 INJECTION, SOLUTION INTRAVENOUS; SUBCUTANEOUS at 08:12

## 2017-12-18 RX ADMIN — ATOVAQUONE 1500 MG: 750 SUSPENSION ORAL at 09:12

## 2017-12-18 RX ADMIN — ISAVUCONAZONIUM SULFATE 372 MG: 186 CAPSULE ORAL at 09:12

## 2017-12-18 RX ADMIN — HEPARIN SODIUM 5000 UNITS: 5000 INJECTION, SOLUTION INTRAVENOUS; SUBCUTANEOUS at 02:12

## 2017-12-18 RX ADMIN — BISACODYL 10 MG: 5 TABLET, COATED ORAL at 08:12

## 2017-12-18 RX ADMIN — Medication 0.5 MG: at 01:12

## 2017-12-18 RX ADMIN — URSODIOL 300 MG: 300 CAPSULE ORAL at 09:12

## 2017-12-18 RX ADMIN — LEVOTHYROXINE SODIUM 75 MCG: 50 TABLET ORAL at 05:12

## 2017-12-18 RX ADMIN — FENTANYL CITRATE 50 MCG: 50 INJECTION, SOLUTION INTRAMUSCULAR; INTRAVENOUS at 11:12

## 2017-12-18 RX ADMIN — POLYETHYLENE GLYCOL 3350 17 G: 17 POWDER, FOR SOLUTION ORAL at 08:12

## 2017-12-18 RX ADMIN — DOCUSATE SODIUM 100 MG: 100 CAPSULE, LIQUID FILLED ORAL at 09:12

## 2017-12-18 RX ADMIN — CYCLOSPORINE 125 MG: 100 CAPSULE, LIQUID FILLED ORAL at 05:12

## 2017-12-18 RX ADMIN — MIDAZOLAM HYDROCHLORIDE 0.5 MG: 1 INJECTION, SOLUTION INTRAMUSCULAR; INTRAVENOUS at 11:12

## 2017-12-18 RX ADMIN — LEVETIRACETAM 500 MG: 500 TABLET, FILM COATED ORAL at 09:12

## 2017-12-18 RX ADMIN — MUPIROCIN: 20 OINTMENT TOPICAL at 09:12

## 2017-12-18 RX ADMIN — OXYCODONE HYDROCHLORIDE 10 MG: 5 TABLET ORAL at 11:12

## 2017-12-18 RX ADMIN — OXYCODONE HYDROCHLORIDE 10 MG: 5 TABLET ORAL at 04:12

## 2017-12-18 RX ADMIN — METRONIDAZOLE 500 MG: 500 TABLET ORAL at 02:12

## 2017-12-18 RX ADMIN — METRONIDAZOLE 500 MG: 500 TABLET ORAL at 05:12

## 2017-12-18 RX ADMIN — HEPARIN SODIUM 5000 UNITS: 5000 INJECTION, SOLUTION INTRAVENOUS; SUBCUTANEOUS at 05:12

## 2017-12-18 RX ADMIN — PANTOPRAZOLE SODIUM 40 MG: 40 TABLET, DELAYED RELEASE ORAL at 09:12

## 2017-12-18 RX ADMIN — DOCUSATE SODIUM 100 MG: 100 CAPSULE, LIQUID FILLED ORAL at 08:12

## 2017-12-18 RX ADMIN — ALBUMIN (HUMAN) 25 G: 12.5 SOLUTION INTRAVENOUS at 08:12

## 2017-12-18 RX ADMIN — OXYCODONE HYDROCHLORIDE 10 MG: 5 TABLET ORAL at 08:12

## 2017-12-18 RX ADMIN — CYCLOSPORINE 125 MG: 100 CAPSULE, LIQUID FILLED ORAL at 09:12

## 2017-12-18 RX ADMIN — METRONIDAZOLE 500 MG: 500 TABLET ORAL at 08:12

## 2017-12-18 RX ADMIN — CIPROFLOXACIN HYDROCHLORIDE 500 MG: 500 TABLET, FILM COATED ORAL at 09:12

## 2017-12-18 RX ADMIN — FENTANYL CITRATE 25 MCG: 50 INJECTION, SOLUTION INTRAMUSCULAR; INTRAVENOUS at 11:12

## 2017-12-18 RX ADMIN — Medication 1 MG: at 10:12

## 2017-12-18 RX ADMIN — DOCUSATE SODIUM 100 MG: 100 CAPSULE, LIQUID FILLED ORAL at 02:12

## 2017-12-18 RX ADMIN — OXYCODONE HYDROCHLORIDE 10 MG: 5 TABLET ORAL at 07:12

## 2017-12-18 RX ADMIN — LEVETIRACETAM 500 MG: 500 TABLET, FILM COATED ORAL at 08:12

## 2017-12-18 RX ADMIN — ALBUMIN (HUMAN) 25 G: 12.5 SOLUTION INTRAVENOUS at 01:12

## 2017-12-18 NOTE — PROGRESS NOTES
Pt c/o 10/10 pain to RLQ drain unrelieved by oral oxycodone. VSS. Seda NP notified. 1x dose 0.5mg dilaudid ordered. Heat applied. Will continue to monitor.

## 2017-12-18 NOTE — ASSESSMENT & PLAN NOTE
- Diagnosed on previous admission.  Last para 11/29 with improving cell counts WBC 1400 with 45% PNMs   Was d/c'ed on augmentin, switched to vanc/cef at admit  - Repeat paracentesis 12/5/17, with  (improving from 1497)  - CT A/P repeated 12/9 with signif ascites  - Abx transitioned to Zosyn/Vanc and Isavu added 12/9 with signif clinical improvement  - Para 12/11 with 4.5 L removed - cell ct signif improved and negative for infection  - IR drain placed 1213 to drain remainder of ascites --> 2.5 L removed and replaced albumin 25% 100 cc x 2.  Cell count negative for infection  - discontinue zosyn/vanc/isavu, transitioned to oral cipro/flagyl 12/15/17  -repeat abd/liver u/s 12/16 with undrained fluid collection,  - IR drained 250cc - labs pending from fluid

## 2017-12-18 NOTE — PROGRESS NOTES
[facility]  Liver Transplant  Progress Note    Patient Name: Jhonny Diana  MRN: [id number]  Admission Date: 12/3/2017  Hospital Length of Stay: 15 days  Code Status: Full Code  Primary Care Provider: Primary Doctor No  Post-Operative Day: 60    ORGAN:   LIVER  Disease Etiology: Acute Alcoholic Hepatitis  Donor Type:    - Brain Death  CDC High Risk:   No  Donor CMV Status:   Donor CMV Status: Positive  Donor HBcAB:   Negative  Donor HCV Status:   Negative  Whole or Partial: Whole Liver  Biliary Anastomosis: End to End  Arterial Anatomy: Standard  Subjective:     History of Present Illness:  Mr. Diana is a 28yo man w/a history of alcoholic cirrhosis s/p DDLT 10/19/2017; c/b seizures (swtiched off prograf to cyclo), ATN requiring HD (last HD ), superificial wound infection s/p wound vac to chevron incision, and recent admit for fevers on  (discharged on empiric augmentin for suspected superficial wound infection) and readmitted  again with fever. Found to have peritonitis (WBC 5000, 75% PNM) neg for bile leak. He was treated initially with vanc/cefepime. Repeat cell counts  with some improvement (WBC 1400, 45% PNM). Pt transitioned to augmentin upon d/c .      Pt presented to the ED 12/3 morning with subjective fever (100.5) and general malaise beginning at 8pm day prior. His abdomen soft, but diffusely tender. Labs reveal elevated WBC count to 12.7 from 6.5 in addition to elevated tbili 3.5 from 2.3 when compared to labs 3 days ago, despite being on augmentin for recent diagnosis of peritonitis. He denied nausea, vomiting, diarrhea, congestion, cough, CP or SOB. He received 1 L bolus NS in the ED. Infectious mosley intiated, BS abx (vanc/cef) started, and obtained CT abdomen.     He has a wound vac in place.     Hospital Course:  Interval History:      : Pt in slightly better spirits today, joking with team. Pt remains with abdominal pain which is improving as  peritonitis continues to resolve (last para ). Bili trending down at 5.2 today, cont ursodiol. PO intake remains poor, will place SHANE tube and initiate tube feeds today, orders placed. Will give albumin x1. Per nephrology will plan for HD today. H/H low on morning labs will plan for 1 unit PRBC with HD. Of note, TSH checked yesterday, level of 11.4 with T4 <3.0, endo consulted- will start levothyroxine today. Cont to encourage PO intake and ambulation. PT ordered 12/7/17, unable to see patient today - will work with pt this weekend. Monitor.     12/9:  Pt with signif nausea and abd pain this AM.  D/c TF.  Place central line and will initiate TPN.  CT A/P obtained today with signif ascites.  Plan IR vs washout in OR.  ID consulted and recommend washout.  Pt transitioned to Zosyn and started on isavu.  Will change PO meds to shane as much as able.      12/10:  Pt with signif improvement today in nausea and feels hungry.  CT A/P 12/9 with signif ascites.  Plan for IR para with drain placement in AM.  Continue Zosyn/Vanc/Isavu.  Will reorder TPN.  D/c shane in AM if not going to transition back to TF.    12/11:  D/c shane.  Continue TPN.  Nausea still improved.  Continue current abx.  Para today with 4.5 L removed.  HD cancelled.  Ascitic fluid labs pending.  Pt NPO after midnight.  Blood/urine cx 12/9 NGTD.    12/12:  Cell count negative from para yesterday.  Pt without nausea today- was able to eat a small amount.  Calorie count initiated.  Plan for IR drain placement tomorrow.  HD today with blood transfusion.    12/13:  Drain placed in IR this AM with WBC/diff negative for infection.  2.5 L total removed- replaced with albumin 25% 100 cc.  Will type and screen tomorrow and tx if < 8 in HD.  Remains slightly tachy/hypotensive but improved.  Eating minimally- reorder TPN.      12/14: no acute events over night.  Drain output decreased this am.  Reports eating better per patient and mother.  Continue current abx &  "tpn.    12/15: HD 12/14.  Wound vac dressing changed today.  Continue to encourage oral intake.  Stop TPN.  Transition to oral antibiotics today per ID recs. Likely will need transfusion with HD in am.    12/16/17: Transfuse 1 unit prbc with hd today.  No acute events over night.  Continues to report "feeling fullness."  Will obtain abdominal/liver ultrasound to assess for fluid collections.    12/17:  Blood counts responded appropriately to transfusion.  Possible IR drainage in am for fluid collection noted on ultrasound.  Obtain KUB to assess for ileus- give suppository.  Consider starting appetite stimulant.    12/18: plan IR drainage today of fluid collection on right side seen on ultrasound.  If fluid not infected plan to d/c both drains in am.    Scheduled Meds:   albumin human 25%  25 g Intravenous Q8H    atovaquone  1,500 mg Oral Daily    bisacodyl  10 mg Oral QHS    bisacodyl  10 mg Rectal Once    ciprofloxacin HCl  500 mg Oral Daily    cycloSPORINE modified (NEORAL)  125 mg Oral BID    docusate sodium  100 mg Oral TID PC    epoetin maurisio (PROCRIT) injection  10,000 Units Intravenous Every Tues, Thurs, Sat    heparin (porcine)  5,000 Units Subcutaneous Q8H    isavuconazonium sulfate  372 mg Oral Daily    levETIRAcetam  500 mg Oral BID    levothyroxine  75 mcg Oral Before breakfast    metroNIDAZOLE  500 mg Oral Q8H    mirtazapine  7.5 mg Oral QHS    pantoprazole  40 mg Oral Daily    polyethylene glycol  17 g Oral BID    ursodiol  300 mg Oral BID     Continuous Infusions:    PRN Meds:sodium chloride, acetaminophen, albumin human 25%, bisacodyl, fentaNYL, heparin (porcine), midazolam, midodrine, ondansetron, ondansetron, oxyCODONE, oxyCODONE, simethicone, sodium chloride 0.9%    Review of Systems   Constitutional: Positive for activity change and appetite change. Negative for chills, fatigue and fever.   Respiratory: Negative for cough, shortness of breath and wheezing.    Cardiovascular: " Positive for leg swelling.   Gastrointestinal: Positive for abdominal distention, abdominal pain and constipation. Negative for nausea and vomiting.   Genitourinary: Negative for difficulty urinating and dysuria.   Skin: Positive for wound.   Allergic/Immunologic: Positive for immunocompromised state.   Neurological: Positive for weakness.   Psychiatric/Behavioral: Negative for confusion and decreased concentration. The patient is not nervous/anxious.      Objective:     Vital Signs (Most Recent):  Temp: 98.8 °F (37.1 °C) (12/18/17 1145)  Pulse: 107 (12/18/17 1145)  Resp: 18 (12/18/17 1145)  BP: (!) 100/57 (12/18/17 1145)  SpO2: 96 % (12/18/17 1145) Vital Signs (24h Range):  Temp:  [98 °F (36.7 °C)-100.1 °F (37.8 °C)] 98.8 °F (37.1 °C)  Pulse:  [] 107  Resp:  [16-20] 18  SpO2:  [96 %-99 %] 96 %  BP: ()/(55-80) 100/57     Weight: 74 kg (163 lb 2.3 oz)  Body mass index is 26.33 kg/m².    Intake/Output - Last 3 Shifts       12/16 0700 - 12/17 0659 12/17 0700 - 12/18 0659 12/18 0700 - 12/19 0659    P.O. 880 1020     Other 600      TPN       Total Intake(mL/kg) 1480 (20.5) 1020 (13.8)     Urine (mL/kg/hr) 25 (0) 0 (0)     Other 2650 (1.5) 55 (0) 250 (0.7)    Stool 0 (0) 0 (0) 0 (0)    Total Output 2675 55 250    Net -1195 +965 -250           Urine Occurrence 0 x 0 x     Stool Occurrence 0 x 1 x 0 x          Physical Exam   Constitutional: He is oriented to person, place, and time. He appears well-developed. No distress.   Temporal and distal extremity muscle wasting   HENT:   Head: Normocephalic and atraumatic.   Eyes: No scleral icterus.   Neck: Normal range of motion. Neck supple. No JVD present.   Cardiovascular: Normal rate, regular rhythm, normal heart sounds and intact distal pulses.    No murmur heard.  Pulmonary/Chest: Effort normal. No respiratory distress. He has decreased breath sounds in the right middle field, the right lower field and the left lower field. He has no wheezes. He exhibits no  tenderness.   Abdominal: Soft. He exhibits distension. There is tenderness (diffuse). There is no rebound and no guarding.   Wound vac to incision   Musculoskeletal: Normal range of motion. He exhibits edema (2+ LE edema). He exhibits no tenderness.   Neurological: He is alert and oriented to person, place, and time. He has normal reflexes.   Skin: Skin is warm and dry. He is not diaphoretic.   Psychiatric: His behavior is normal. Judgment and thought content normal. His mood appears anxious.   Pt anxious and expressing feeling overwhelmed   Nursing note and vitals reviewed.      Laboratory:  Immunosuppressants         Stop Route Frequency     cycloSPORINE modified capsule 125 mg      -- Oral 2 times daily        CBC:     Recent Labs  Lab 12/18/17  0500   WBC 2.90*   RBC 2.74*   HGB 8.5*   HCT 25.2*      MCV 92   MCH 31.0   MCHC 33.7     CMP:     Recent Labs  Lab 12/18/17  0500   GLU 69*   CALCIUM 8.7   ALBUMIN 1.9*   PROT 4.9*   *   K 3.8   CO2 22*      BUN 18   CREATININE 4.2*   ALKPHOS 118   ALT 6*   AST 20   BILITOT 2.5*     Coagulation:   No results for input(s): PT, INR, APTT in the last 168 hours.  Cardiac Markers: No results for input(s): CKMB, TROPONINT, MYOGLOBIN in the last 168 hours.  Labs within the past 24 hours have been reviewed.    Diagnostic Results:  I have personally reviewed all pertinent imaging studies.    Assessment/Plan:     * Fever    - See peritonitis  - Blood/urine cx repeated 12/9 NGTD  -remains afebrile on cipro/flagyl combo - like adjust, speak with ID          Peritonitis    - Diagnosed on previous admission.  Last para 11/29 with improving cell counts WBC 1400 with 45% PNMs   Was d/c'ed on augmentin, switched to vanc/cef at admit  - Repeat paracentesis 12/5/17, with  (improving from 1497)  - CT A/P repeated 12/9 with signif ascites  - Abx transitioned to Zosyn/Vanc and Isavu added 12/9 with signif clinical improvement  - Para 12/11 with 4.5 L removed - cell  ct signif improved and negative for infection  - IR drain placed 1213 to drain remainder of ascites --> 2.5 L removed and replaced albumin 25% 100 cc x 2.  Cell count negative for infection  - discontinue zosyn/vanc/isavu, transitioned to oral cipro/flagyl 12/15/17  -repeat abd/liver u/s 12/16 with undrained fluid collection,  - IR drained 250cc - labs pending from fluid          LAURA (acute kidney injury)    -  Creatinine significantly elevated on 12/6 and HD resumed- was not on HD at admit as kidney function had improved.   - HD last 12/16.  - UOP has trended down daily and is now minimal.  - Cont strict I/O's.             Liver transplanted    - A/w fevers and hyperbilirubinemia.   - LFTs improving.  - PBS consulted. ERCP attempted on 12/4 but unable to cannulate. MRCP on 12/4 noted with stricture.  - ERCP 12/6/17  With post-anastamosis stricture, stent placed.   - Para 12/5/17, 3L removed ( down from 1497, cr 5.2, bili 2.5)  - Para 12/11/17, 4.5L removed ( )  - para with drain placement 12/13 (, SEgs 25)  - para 12/18 (WBC 9, SEgs 15)          Delayed surgical wound healing    - Wound vac in place, last changed 12/15 and healing well  - wound care following, dressing changes Tues/Friday        Long-term use of immunosuppressant medication    - Continue cyclosporine  - Hold Cellcept for infection  - Will monitor for signs of toxic side effects, check daily Cyclosporine troughs, and change meds accordingly.        Prophylactic immunotherapy    - See long term immunosuppressant          At risk for opportunistic infections    - hold valcyte as wbc low, check cmv pcr (weekly surveillance)  - Continue Bactrim for PCP proph          Biliary stricture of transplanted liver    - Elevated t bili post liver txp in setting of abdominal pain, decreased appetitive and intermittent nausea.  PBS consulted to assess duct per ERCP.  - ERCP performed 12/5 PBS unable to cannulate. MRCP with stricture.  - ERCP  12/6/17 with post-anastamosis stricture with stent placed.   - Tbili peaked at 9.1, now trending down.   -started ursodiol 12/7/17.   - T bili has remained around 2 for last several days --> monitor closely.        Seizure    - Cont keppra          Severe malnutrition    - Pt malnourished with poor PO intake  - SHANE tube placed 12/8/17 and TF started  - Not tolerating TF --> central line placed and TPN started 12/9  - Remove shane 12/11  - starting appetite stimulant - pre albumin 11        Hypothyroidism    -TSH 11.4  -T4 ,3.0  - Endo consulted. Start levothyroxine 75mcg PO. Instructions for administration given. Appreciate recs.           Anemia of chronic disease    - 1 unit PRBC with HD 12/8/17  - 1 u PRBC with HD 12/12  - Discussed Fe studies with nephrology- will continue PRN transfusions as with minimal Fe stores  - transfused 1 unit prbc with HD 12/16  - Monitor CBC daily              VTE Risk Mitigation         Ordered     heparin (porcine) injection 5,000 Units  Every 8 hours     Route:  Subcutaneous        12/11/17 1126     heparin (porcine) injection 1,000 Units  As needed (PRN)     Route:  Intra-Catheter        12/08/17 1736     Medium Risk of VTE  Once      12/03/17 0249     Place sequential compression device  Until discontinued      12/03/17 0249          The patients clinical status was discussed at multidisplinary rounds, involving transplant surgery, transplant medicine, pharmacy, nursing, nutrition, and social work    Discharge Planning: if eating improves, d/c by end of week  Monitor kidney function --> may need outpt HD  Monitor HH needs vs rehab for deconditioned status      Seda Camacho, NP  Liver Transplant  [facility]

## 2017-12-18 NOTE — ASSESSMENT & PLAN NOTE
- See peritonitis  - Blood/urine cx repeated 12/9 NGTD  -remains afebrile on cipro/flagyl combo - like adjust, speak with ID

## 2017-12-18 NOTE — NURSING TRANSFER
Nursing Transfer Note      12/18/2017     Transfer To: [id number]    Transfer via stretcher    Transported by transport team    Chart send with patient: Yes

## 2017-12-18 NOTE — PROGRESS NOTES
This SW received a note from CHARLINE Petty LCSW that she contact [facility] to notify the company the patient was still inpt at this time and is not discharging on today.  SW remains available.

## 2017-12-18 NOTE — SUBJECTIVE & OBJECTIVE
Interval History: No major change in symptoms. Afebrile. Plan for drainage of fluid collection tomorrow.    Review of Systems   Constitutional: Negative for chills, fatigue and fever.   HENT: Negative for congestion, mouth sores and sinus pressure.    Respiratory: Negative for shortness of breath.    Gastrointestinal: Positive for abdominal distention and abdominal pain. Negative for diarrhea, nausea and vomiting.   Genitourinary: Negative for dysuria, hematuria and urgency.   Musculoskeletal: Negative for arthralgias and myalgias.   Skin: Negative for rash.     Objective:     Vital Signs (Most Recent):  Temp: 100.1 °F (37.8 °C) (12/17/17 1701)  Pulse: 98 (12/17/17 1701)  Resp: 18 (12/17/17 1701)  BP: (!) 99/56 (12/17/17 1701)  SpO2: 97 % (12/17/17 1701) Vital Signs (24h Range):  Temp:  [99 °F (37.2 °C)-100.1 °F (37.8 °C)] 100.1 °F (37.8 °C)  Pulse:  [] 98  Resp:  [17-20] 18  SpO2:  [93 %-99 %] 97 %  BP: ()/(51-58) 99/56     Weight: 72.1 kg (158 lb 15.2 oz)  Body mass index is 25.66 kg/m².    Estimated Creatinine Clearance: 35.4 mL/min (based on SCr of 2.8 mg/dL (H)).    Physical Exam   Constitutional: He is oriented to person, place, and time. He appears well-developed and well-nourished.   HENT:   Head: Normocephalic and atraumatic.   Eyes: Conjunctivae and EOM are normal. Pupils are equal, round, and reactive to light.   Neck: Normal range of motion. Neck supple.   Cardiovascular: Normal rate, regular rhythm and normal heart sounds.    Pulmonary/Chest: Effort normal and breath sounds normal.   Abdominal: Soft. Bowel sounds are normal. He exhibits distension. There is tenderness. There is no rebound and no guarding.   Musculoskeletal: Normal range of motion. He exhibits no edema, tenderness or deformity.   Neurological: He is alert and oriented to person, place, and time.   Skin: No rash noted. No erythema.       Significant Labs:   CBC:     Recent Labs  Lab 12/16/17  0430 12/17/17  0540   WBC 3.13*  3.11*   HGB 7.5* 8.6*   HCT 22.4* 25.3*    317     CMP:     Recent Labs  Lab 12/16/17  0430 12/17/17  0540   * 136   K 4.7 3.7    101   CO2 23 23   GLU 66* 56*   BUN 26* 13   CREATININE 4.6* 2.8*   CALCIUM 8.9 8.3*   PROT 5.1* 5.1*   ALBUMIN 1.9* 1.9*   BILITOT 2.3* 2.5*   ALKPHOS 81 79   AST 18 21   ALT <5* 5*   ANIONGAP 11 12   EGFRNONAA 16.1* 29.4*       Significant Imaging: I have reviewed all pertinent imaging results/findings within the past 24 hours.     Ultrasound: There is a moderate sized loculated fluid collection in the right upper quadrant demonstrating septations which appears to be in contiguity with a more simple fluid collection in the right lower quadrant.    CT A/P: pending    Microbiology:  12/13 ascites cx: negative

## 2017-12-18 NOTE — PROGRESS NOTES
[facility]  Infectious Disease  Progress Note    Patient Name: Jhonny Diana  MRN: [id number]  Admission Date: 12/3/2017  Length of Stay: 14 days  Attending Physician: Solis Chapa MD  Primary Care Provider: Primary Doctor No    Isolation Status: No active isolations  Assessment/Plan:      Peritonitis    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, CMV D+/R+, steroid induction, on maintenance tacro/MMF/pred; c/b seizures, LUARA, superificial wound infection s/p wound vac to [facility], and recent admission on 11/24 with culture negative peritonitis with WBC ~5000 with ~75% PMN's s/p broad coverage/augmentin course) who was admitted on 12/3/2017 with persistent peritonitis despite augmentin course likely due to indolent biliary leakage (with only a biliary stricture noted on ERCP 12/6 s/p sphincterotomy/biliary stent placement in CBD and abdominal drain placement 12/13/2017). His peritonitis has improved with stenting, drainage, and empiric broad coverage but he continues to have abdominal pain/distension on oral coverage concerning for ongoing infection with a RUQ loculated fluid collection noted on ultrasound as a likely source. He is stable while awaiting fluid collection drainage.    - would continue empiric oral cipro/flagyl and isavuconazole (also as mold prophylaxis) for coverage of infected fluid collection  - would continue other prophylaxis per protocol  - await drainage of fluid collection tomorrow -- please send for cell counts/diff, gram stain, bacterial cx, and fungal cx            Anticipated Disposition: pending improvement    Thank you for your consult. I will follow-up with patient. Please contact us if you have any additional questions.     Melanie Bergman MD  Transplant ID Attending  [id number]    Melanie Bergman MD  Infectious Disease  [facility]    Subjective:     Principal Problem:Fever    HPI: No  notes on file  Interval History: No major change in symptoms. Afebrile. Plan for drainage of fluid collection tomorrow.    Review of Systems   Constitutional: Negative for chills, fatigue and fever.   HENT: Negative for congestion, mouth sores and sinus pressure.    Respiratory: Negative for shortness of breath.    Gastrointestinal: Positive for abdominal distention and abdominal pain. Negative for diarrhea, nausea and vomiting.   Genitourinary: Negative for dysuria, hematuria and urgency.   Musculoskeletal: Negative for arthralgias and myalgias.   Skin: Negative for rash.     Objective:     Vital Signs (Most Recent):  Temp: 100.1 °F (37.8 °C) (12/17/17 1701)  Pulse: 98 (12/17/17 1701)  Resp: 18 (12/17/17 1701)  BP: (!) 99/56 (12/17/17 1701)  SpO2: 97 % (12/17/17 1701) Vital Signs (24h Range):  Temp:  [99 °F (37.2 °C)-100.1 °F (37.8 °C)] 100.1 °F (37.8 °C)  Pulse:  [] 98  Resp:  [17-20] 18  SpO2:  [93 %-99 %] 97 %  BP: ()/(51-58) 99/56     Weight: 72.1 kg (158 lb 15.2 oz)  Body mass index is 25.66 kg/m².    Estimated Creatinine Clearance: 35.4 mL/min (based on SCr of 2.8 mg/dL (H)).    Physical Exam   Constitutional: He is oriented to person, place, and time. He appears well-developed and well-nourished.   HENT:   Head: Normocephalic and atraumatic.   Eyes: Conjunctivae and EOM are normal. Pupils are equal, round, and reactive to light.   Neck: Normal range of motion. Neck supple.   Cardiovascular: Normal rate, regular rhythm and normal heart sounds.    Pulmonary/Chest: Effort normal and breath sounds normal.   Abdominal: Soft. Bowel sounds are normal. He exhibits distension. There is tenderness. There is no rebound and no guarding.   Musculoskeletal: Normal range of motion. He exhibits no edema, tenderness or deformity.   Neurological: He is alert and oriented to person, place, and time.   Skin: No rash noted. No erythema.       Significant Labs:   CBC:     Recent Labs  Lab 12/16/17  0430 12/17/17  0544    WBC 3.13* 3.11*   HGB 7.5* 8.6*   HCT 22.4* 25.3*    317     CMP:     Recent Labs  Lab 12/16/17  0430 12/17/17  0540   * 136   K 4.7 3.7    101   CO2 23 23   GLU 66* 56*   BUN 26* 13   CREATININE 4.6* 2.8*   CALCIUM 8.9 8.3*   PROT 5.1* 5.1*   ALBUMIN 1.9* 1.9*   BILITOT 2.3* 2.5*   ALKPHOS 81 79   AST 18 21   ALT <5* 5*   ANIONGAP 11 12   EGFRNONAA 16.1* 29.4*       Significant Imaging: I have reviewed all pertinent imaging results/findings within the past 24 hours.     Ultrasound: There is a moderate sized loculated fluid collection in the right upper quadrant demonstrating septations which appears to be in contiguity with a more simple fluid collection in the right lower quadrant.    CT A/P: pending    Microbiology:  12/13 ascites cx: negative

## 2017-12-18 NOTE — PLAN OF CARE
Problem: Patient Care Overview  Goal: Plan of Care Review  Outcome: Ongoing (interventions implemented as appropriate)  Pt remained free from injury during shift. VSS. Pt c/o bilateral lower abd pain. 10mg oxycodone given with moderate relief. Pt to IR to have RLQ drain placed. RLQ and LLQ bili drain removed this afternoon. Wound vac remains in placed to cont suction. Minimal output. Albumin ordered q8hr. Appetite minimal. Pt encouraged to eat. Drinking fluids. No BM today. Bowel regimen in place. Pt running NS-ST on telemetry. Ambulating with standby assist. Call light in reach. Will continue to monitor.

## 2017-12-18 NOTE — SUBJECTIVE & OBJECTIVE
Interval History: No major change in symptoms. Low-grade temps persistent. Collection accessed, 250cc drained, serous. Counts not consistent with untreated infection.    Review of Systems   Constitutional: Negative for chills, fatigue and fever.   HENT: Negative for congestion, mouth sores and sinus pressure.    Respiratory: Negative for shortness of breath.    Gastrointestinal: Positive for abdominal distention and abdominal pain. Negative for diarrhea, nausea and vomiting.   Genitourinary: Negative for dysuria, hematuria and urgency.   Musculoskeletal: Negative for arthralgias and myalgias.   Skin: Negative for rash.     Objective:     Vital Signs (Most Recent):  Temp: 98.9 °F (37.2 °C) (12/18/17 1522)  Pulse: 107 (12/18/17 1522)  Resp: 16 (12/18/17 1522)  BP: (!) 112/57 (12/18/17 1522)  SpO2: 97 % (12/18/17 1522) Vital Signs (24h Range):  Temp:  [98 °F (36.7 °C)-99.2 °F (37.3 °C)] 98.9 °F (37.2 °C)  Pulse:  [] 107  Resp:  [16-20] 16  SpO2:  [96 %-99 %] 97 %  BP: (100-119)/(55-80) 112/57     Weight: 74 kg (163 lb 2.3 oz)  Body mass index is 26.33 kg/m².    Estimated Creatinine Clearance: 23.6 mL/min (based on SCr of 4.2 mg/dL (H)).    Physical Exam   Constitutional: He is oriented to person, place, and time. He appears well-developed and well-nourished.   HENT:   Head: Normocephalic and atraumatic.   Eyes: Conjunctivae and EOM are normal. Pupils are equal, round, and reactive to light.   Neck: Normal range of motion. Neck supple.   Cardiovascular: Normal rate, regular rhythm and normal heart sounds.    Pulmonary/Chest: Effort normal and breath sounds normal.   Abdominal: Soft. Bowel sounds are normal. He exhibits distension. There is tenderness. There is no rebound and no guarding.   Musculoskeletal: Normal range of motion. He exhibits no edema, tenderness or deformity.   Neurological: He is alert and oriented to person, place, and time.   Skin: No rash noted. No erythema.       Significant Labs:   CBC:      Recent Labs  Lab 12/17/17  0540 12/18/17  0500   WBC 3.11* 2.90*   HGB 8.6* 8.5*   HCT 25.3* 25.2*    321     CMP:     Recent Labs  Lab 12/17/17  0540 12/18/17  0500    135*   K 3.7 3.8    101   CO2 23 22*   GLU 56* 69*   BUN 13 18   CREATININE 2.8* 4.2*   CALCIUM 8.3* 8.7   PROT 5.1* 4.9*   ALBUMIN 1.9* 1.9*   BILITOT 2.5* 2.5*   ALKPHOS 79 118   AST 21 20   ALT 5* 6*   ANIONGAP 12 12   EGFRNONAA 29.4* 18.0*       Significant Imaging: I have reviewed all pertinent imaging results/findings within the past 24 hours.     Ultrasound: There is a moderate sized loculated fluid collection in the right upper quadrant demonstrating septations which appears to be in contiguity with a more simple fluid collection in the right lower quadrant.    CT A/P: pending    Microbiology:  12/13 ascites cx: negative

## 2017-12-18 NOTE — ASSESSMENT & PLAN NOTE
- A/w fevers and hyperbilirubinemia.   - LFTs improving.  - PBS consulted. ERCP attempted on 12/4 but unable to cannulate. MRCP on 12/4 noted with stricture.  - ERCP 12/6/17  With post-anastamosis stricture, stent placed.   - Para 12/5/17, 3L removed ( down from 1497, cr 5.2, bili 2.5)  - Para 12/11/17, 4.5L removed ( )  - para with drain placement 12/13 (, SEgs 25)  - para 12/18 (WBC 9, SEgs 15)

## 2017-12-18 NOTE — PROGRESS NOTES
Pt left unit via stretcher for IR. 1mg Dilaudid given prior to departure as ordered. Will continue to monitor.

## 2017-12-18 NOTE — H&P
Inpatient Radiology Pre-procedure Note    History of Present Illness:    Jhonny Diana is a 27 y.o. male who presents for CT guided drainage of an intraabdominal collection.    Admission H&P reviewed.    Past Medical History:   Diagnosis Date    Alcoholic hepatitis with ascites     Alcoholic hepatitis with ascites     History of hematemesis 9/28/2017    Hypertension     Renal disorder      Past Surgical History:   Procedure Laterality Date    APPENDECTOMY         Review of Systems:       As documented in primary team H&P    Home Meds:   Prior to Admission medications    Medication Sig Start Date End Date Taking? Authorizing Provider   amoxicillin-clavulanate 500-125mg (AUGMENTIN) 500-125 mg Tab Take 1 tablet (500 mg total) by mouth 2 (two) times daily. 11/30/17   Solis Chapa MD   aspirin (ECOTRIN) 81 MG EC tablet Take 1 tablet (81 mg total) by mouth once daily. 11/8/17   Nathanael Medina MD   atovaquone (MEPRON) 750 mg/5 mL Susp Take 10 mLs (1,500 mg total) by mouth once daily. Stop on 4/17/18 10/19/17 4/17/18  Nathanael Medina MD   cycloSPORINE modified, NEORAL, (NEORAL) 100 MG capsule Take 1 capsule (100 mg total) by mouth 2 (two) times daily. 11/30/17   Solis Chapa MD   docusate sodium (COLACE) 100 MG capsule Take 100 mg by mouth 3 (three) times daily as needed for Constipation.    Historical Provider, MD   ergocalciferol (ERGOCALCIFEROL) 50,000 unit Cap Take 1 capsule (50,000 Units total) by mouth every 7 days. 11/7/17   Nathanael Medina MD   levETIRAcetam (KEPPRA) 500 MG Tab Take 1 tablet (500 mg total) by mouth 2 (two) times daily. 11/7/17 11/7/18  Nathanael Medina MD   multivitamin (THERAGRAN) tablet Take 1 tablet by mouth once daily. 11/7/17   Nathanael Medina MD   mycophenolate (CELLCEPT) 250 mg Cap Take 2 capsules (500 mg total) by mouth 2 (two) times daily. 11/21/17   Alin López Jr., MD   ondansetron (ZOFRAN-ODT) 8 MG TbDL Take 1 tablet (8 mg total) by mouth every 8 (eight) hours as needed (nausea).  11/9/17   Nathanael Medina MD   oxyCODONE-acetaminophen (PERCOCET)  mg per tablet Take 0.5-1 tablets by mouth every 4 (four) hours as needed for Pain. 11/30/17   Geronimo Vera PA-C   pantoprazole (PROTONIX) 40 MG tablet Take 1 tablet (40 mg total) by mouth once daily. 11/8/17   Nathanael Medina MD   predniSONE (DELTASONE) 10 MG tablet Take 20mg PO QD 10/29-11/4; 15mg PO QD 11/5-11/11; 10mg PO QD 11/12-11/18; 5mg PO QD 11/19-11/25; 2.5mg PO QD 11/26-12/2; stop on 12/2/17 11/8/17   Nathanael Medina MD   sodium bicarbonate 650 MG tablet Take 2 tablets (1,300 mg total) by mouth 2 (two) times daily. 11/30/17   Geronimo Vera PA-C   thiamine 100 MG tablet Take 1 tablet (100 mg total) by mouth once daily. 11/7/17   Nathanael Medina MD   valGANciclovir (VALCYTE) 450 mg Tab Take 1 tablet (450 mg total) by mouth every Mon, Wed, Fri. Stop on 1/17/18 11/22/17   Alin López Jr., MD     Scheduled Meds:    albumin human 25%  25 g Intravenous Q8H    atovaquone  1,500 mg Oral Daily    bisacodyl  10 mg Oral QHS    bisacodyl  10 mg Rectal Once    ciprofloxacin HCl  500 mg Oral Daily    cycloSPORINE modified (NEORAL)  125 mg Oral BID    docusate sodium  100 mg Oral TID PC    epoetin maurisio (PROCRIT) injection  10,000 Units Intravenous Every Tues, Thurs, Sat    heparin (porcine)  5,000 Units Subcutaneous Q8H    HYDROmorphone  1 mg Intravenous Once    isavuconazonium sulfate  372 mg Oral Daily    levETIRAcetam  500 mg Oral BID    levothyroxine  75 mcg Oral Before breakfast    metroNIDAZOLE  500 mg Oral Q8H    pantoprazole  40 mg Oral Daily    polyethylene glycol  17 g Oral BID    ursodiol  300 mg Oral BID     Continuous Infusions:   PRN Meds:sodium chloride, acetaminophen, albumin human 25%, bisacodyl, heparin (porcine), midodrine, ondansetron, ondansetron, oxyCODONE, oxyCODONE, ramelteon, simethicone, sodium chloride 0.9%     Anticoagulants/Antiplatelets: Heparin 5000 u subq daily, last given at 0545 on  12/18/17    Allergies:   Review of patient's allergies indicates:   Allergen Reactions    Bactrim [sulfamethoxazole-trimethoprim] Other (See Comments)     Mookie Trell Syndrome     Sedation Hx: have not been any systemic reactions    Labs:    INR 12/11/17 = 1.1    No results for input(s): INR in the last 168 hours.    Invalid input(s):  PT,  PTT    Recent Labs  Lab 12/18/17  0500   WBC 2.90*   HGB 8.5*   HCT 25.2*   MCV 92         Recent Labs  Lab 12/17/17  0540 12/18/17  0500   GLU 56* 69*    135*   K 3.7 3.8    101   CO2 23 22*   BUN 13 18   CREATININE 2.8* 4.2*   CALCIUM 8.3* 8.7   MG 1.6 1.7   ALT 5* 6*   AST 21 20   ALBUMIN 1.9* 1.9*   BILITOT 2.5* 2.5*   BILIDIR 2.0*  --          Vitals:  Temp: 98.6 °F (37 °C) (12/18/17 0737)  Pulse: 96 (12/18/17 0737)  Resp: 18 (12/18/17 0737)  BP: 114/61 (12/18/17 0737)  SpO2: 99 % (12/18/17 0737)     Physical Exam:  ASA: 2  Mallampati: 2    General: no acute distress  Mental Status: alert and oriented to person, place and time  HEENT: normocephalic, atraumatic  Chest: unlabored breathing  Heart: regular heart rate  Abdomen: nondistended  Extremity: moves all extremities    Plan: CT guided drainage of an intraabdominal collection  Sedation Plan: moderate    Devante Marroquin MD  PGY-5  Department of Radiology  [phone]

## 2017-12-18 NOTE — SUBJECTIVE & OBJECTIVE
Scheduled Meds:   albumin human 25%  25 g Intravenous Q8H    atovaquone  1,500 mg Oral Daily    bisacodyl  10 mg Oral QHS    bisacodyl  10 mg Rectal Once    ciprofloxacin HCl  500 mg Oral Daily    cycloSPORINE modified (NEORAL)  125 mg Oral BID    docusate sodium  100 mg Oral TID PC    epoetin maurisio (PROCRIT) injection  10,000 Units Intravenous Every Tues, Thurs, Sat    heparin (porcine)  5,000 Units Subcutaneous Q8H    isavuconazonium sulfate  372 mg Oral Daily    levETIRAcetam  500 mg Oral BID    levothyroxine  75 mcg Oral Before breakfast    metroNIDAZOLE  500 mg Oral Q8H    mirtazapine  7.5 mg Oral QHS    pantoprazole  40 mg Oral Daily    polyethylene glycol  17 g Oral BID    ursodiol  300 mg Oral BID     Continuous Infusions:    PRN Meds:sodium chloride, acetaminophen, albumin human 25%, bisacodyl, fentaNYL, heparin (porcine), midazolam, midodrine, ondansetron, ondansetron, oxyCODONE, oxyCODONE, simethicone, sodium chloride 0.9%    Review of Systems   Constitutional: Positive for activity change and appetite change. Negative for chills, fatigue and fever.   Respiratory: Negative for cough, shortness of breath and wheezing.    Cardiovascular: Positive for leg swelling.   Gastrointestinal: Positive for abdominal distention, abdominal pain and constipation. Negative for nausea and vomiting.   Genitourinary: Negative for difficulty urinating and dysuria.   Skin: Positive for wound.   Allergic/Immunologic: Positive for immunocompromised state.   Neurological: Positive for weakness.   Psychiatric/Behavioral: Negative for confusion and decreased concentration. The patient is not nervous/anxious.      Objective:     Vital Signs (Most Recent):  Temp: 98.8 °F (37.1 °C) (12/18/17 1145)  Pulse: 107 (12/18/17 1145)  Resp: 18 (12/18/17 1145)  BP: (!) 100/57 (12/18/17 1145)  SpO2: 96 % (12/18/17 1145) Vital Signs (24h Range):  Temp:  [98 °F (36.7 °C)-100.1 °F (37.8 °C)] 98.8 °F (37.1 °C)  Pulse:  []  107  Resp:  [16-20] 18  SpO2:  [96 %-99 %] 96 %  BP: ()/(55-80) 100/57     Weight: 74 kg (163 lb 2.3 oz)  Body mass index is 26.33 kg/m².    Intake/Output - Last 3 Shifts       12/16 0700 - 12/17 0659 12/17 0700 - 12/18 0659 12/18 0700 - 12/19 0659    P.O. 880 1020     Other 600      TPN       Total Intake(mL/kg) 1480 (20.5) 1020 (13.8)     Urine (mL/kg/hr) 25 (0) 0 (0)     Other 2650 (1.5) 55 (0) 250 (0.7)    Stool 0 (0) 0 (0) 0 (0)    Total Output 2675 55 250    Net -1195 +965 -250           Urine Occurrence 0 x 0 x     Stool Occurrence 0 x 1 x 0 x          Physical Exam   Constitutional: He is oriented to person, place, and time. He appears well-developed. No distress.   Temporal and distal extremity muscle wasting   HENT:   Head: Normocephalic and atraumatic.   Eyes: No scleral icterus.   Neck: Normal range of motion. Neck supple. No JVD present.   Cardiovascular: Normal rate, regular rhythm, normal heart sounds and intact distal pulses.    No murmur heard.  Pulmonary/Chest: Effort normal. No respiratory distress. He has decreased breath sounds in the right middle field, the right lower field and the left lower field. He has no wheezes. He exhibits no tenderness.   Abdominal: Soft. He exhibits distension. There is tenderness (diffuse). There is no rebound and no guarding.   Wound vac to incision   Musculoskeletal: Normal range of motion. He exhibits edema (2+ LE edema). He exhibits no tenderness.   Neurological: He is alert and oriented to person, place, and time. He has normal reflexes.   Skin: Skin is warm and dry. He is not diaphoretic.   Psychiatric: His behavior is normal. Judgment and thought content normal. His mood appears anxious.   Pt anxious and expressing feeling overwhelmed   Nursing note and vitals reviewed.      Laboratory:  Immunosuppressants         Stop Route Frequency     cycloSPORINE modified capsule 125 mg      -- Oral 2 times daily        CBC:     Recent Labs  Lab 12/18/17  0500   WBC  2.90*   RBC 2.74*   HGB 8.5*   HCT 25.2*      MCV 92   MCH 31.0   MCHC 33.7     CMP:     Recent Labs  Lab 12/18/17  0500   GLU 69*   CALCIUM 8.7   ALBUMIN 1.9*   PROT 4.9*   *   K 3.8   CO2 22*      BUN 18   CREATININE 4.2*   ALKPHOS 118   ALT 6*   AST 20   BILITOT 2.5*     Coagulation:   No results for input(s): PT, INR, APTT in the last 168 hours.  Cardiac Markers: No results for input(s): CKMB, TROPONINT, MYOGLOBIN in the last 168 hours.  Labs within the past 24 hours have been reviewed.    Diagnostic Results:  I have personally reviewed all pertinent imaging studies.

## 2017-12-18 NOTE — ASSESSMENT & PLAN NOTE
- Wound vac in place, last changed 12/15 and healing well  - wound care following, dressing changes Tues/Friday

## 2017-12-18 NOTE — PROCEDURES
Percutaneous drains removed from right and left quadrants of abdomen.  Cell counts improved of peritoneal fluid, so no longer infected.  Patient tolerated procedure well with no complication.

## 2017-12-18 NOTE — ASSESSMENT & PLAN NOTE
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, CMV D+/R+, steroid induction, on maintenance tacro/MMF/pred; c/b seizures, LAURA, superificial wound infection s/p wound vac to [facility], and recent admission on 11/24 with culture negative peritonitis with WBC ~5000 with ~75% PMN's s/p broad coverage/augmentin course) who was admitted on 12/3/2017 with persistent peritonitis despite augmentin course likely due to indolent biliary leakage (with only a biliary stricture noted on ERCP 12/6 s/p sphincterotomy/biliary stent placement in CBD and abdominal drain placement 12/13/2017). His peritonitis has improved with stenting, drainage, and empiric broad coverage and aspiration of a RUQ loculated fluid collection noted on ultrasound showed no evidence of active infection. He is currently stable.    - would continue empiric oral cipro/flagyl for an additional week   - would continue isavuconazole as mold prophylaxis per protocol  - would continue other prophylaxis per protocol  - will f/u pending cultures with you  - disposition per primary team

## 2017-12-18 NOTE — PROCEDURES
"Radiology Post-Procedure Note    Pre Op Diagnosis: right abdomen fluid collection    Post Op Diagnosis: right abdomen fluid collection    Procedure:right abdomen fluid collection    Procedure performed by: William Liz MD    Written Informed Consent Obtained: Yes    Specimen Removed: YES 250cc    Estimated Blood Loss: Minimal    Findings: CT was used for localization of abnormal fluid collection. A needle was inserted into the fluid collection and cloudy serous fluid was aspirated.  A wire was inserted into the collection and the tract was dilated.  A 8.0 [place] all-purpose drainage catheter was inserted and a pigtail loop of the distal end was formed.  The catheter was sutured into place and approximately  250 mL fluid was removed.     A specimen was sent to the lab for further analysis and culture.    The patient tolerated procedure well and there were no complications. Please see procedure report under Imaging for further details.    Carl Holder MD ([place])  Radiology PGY-4  [phone]       "

## 2017-12-18 NOTE — ASSESSMENT & PLAN NOTE
- Pt malnourished with poor PO intake  - SHANE tube placed 12/8/17 and TF started  - Not tolerating TF --> central line placed and TPN started 12/9  - Remove shane 12/11  - starting appetite stimulant - pre albumin 11

## 2017-12-18 NOTE — PROGRESS NOTES
[facility]  Infectious Disease  Progress Note    Patient Name: Jhonny Diana  MRN: [id number]  Admission Date: 12/3/2017  Length of Stay: 15 days  Attending Physician: Solis Chapa MD  Primary Care Provider: Primary Doctor No    Isolation Status: No active isolations  Assessment/Plan:      Peritonitis    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+, steroid induction, on maintenance tacro/MMF/pred; c/b seizures, LAURA, superificial wound infection s/p wound vac to [facility], and recent admission on 11/24 with culture negative peritonitis with WBC ~5000 with ~75% PMN's s/p broad coverage/augmentin course) who was admitted on 12/3/2017 with persistent peritonitis despite augmentin course likely due to indolent biliary leakage (with only a biliary stricture noted on ERCP 12/6 s/p sphincterotomy/biliary stent placement in CBD and abdominal drain placement 12/13/2017). His peritonitis has improved with stenting, drainage, and empiric broad coverage and aspiration of a RUQ loculated fluid collection noted on ultrasound showed no evidence of active infection. He is currently stable.    - would continue empiric oral cipro/flagyl for an additional week   - would continue isavuconazole as mold prophylaxis per protocol  - would continue other prophylaxis per protocol  - will f/u pending cultures with you  - disposition per primary team            Anticipated Disposition: pending improvement    Thank you for your consult. I will follow-up with patient. Please contact us if you have any additional questions.     Melanie Bergman MD  Transplant ID Attending  [phone]    Melanie Bergman MD  Infectious Disease  [facility]    Subjective:     Principal Problem:Fever    HPI: No notes on file  Interval History: No major change in symptoms. Low-grade temps persistent. Collection accessed, 250cc drained, serous. Counts not  consistent with untreated infection.    Review of Systems   Constitutional: Negative for chills, fatigue and fever.   HENT: Negative for congestion, mouth sores and sinus pressure.    Respiratory: Negative for shortness of breath.    Gastrointestinal: Positive for abdominal distention and abdominal pain. Negative for diarrhea, nausea and vomiting.   Genitourinary: Negative for dysuria, hematuria and urgency.   Musculoskeletal: Negative for arthralgias and myalgias.   Skin: Negative for rash.     Objective:     Vital Signs (Most Recent):  Temp: 98.9 °F (37.2 °C) (12/18/17 1522)  Pulse: 107 (12/18/17 1522)  Resp: 16 (12/18/17 1522)  BP: (!) 112/57 (12/18/17 1522)  SpO2: 97 % (12/18/17 1522) Vital Signs (24h Range):  Temp:  [98 °F (36.7 °C)-99.2 °F (37.3 °C)] 98.9 °F (37.2 °C)  Pulse:  [] 107  Resp:  [16-20] 16  SpO2:  [96 %-99 %] 97 %  BP: (100-119)/(55-80) 112/57     Weight: 74 kg (163 lb 2.3 oz)  Body mass index is 26.33 kg/m².    Estimated Creatinine Clearance: 23.6 mL/min (based on SCr of 4.2 mg/dL (H)).    Physical Exam   Constitutional: He is oriented to person, place, and time. He appears well-developed and well-nourished.   HENT:   Head: Normocephalic and atraumatic.   Eyes: Conjunctivae and EOM are normal. Pupils are equal, round, and reactive to light.   Neck: Normal range of motion. Neck supple.   Cardiovascular: Normal rate, regular rhythm and normal heart sounds.    Pulmonary/Chest: Effort normal and breath sounds normal.   Abdominal: Soft. Bowel sounds are normal. He exhibits distension. There is tenderness. There is no rebound and no guarding.   Musculoskeletal: Normal range of motion. He exhibits no edema, tenderness or deformity.   Neurological: He is alert and oriented to person, place, and time.   Skin: No rash noted. No erythema.       Significant Labs:   CBC:     Recent Labs  Lab 12/17/17  0540 12/18/17  0500   WBC 3.11* 2.90*   HGB 8.6* 8.5*   HCT 25.3* 25.2*    321     CMP:      Recent Labs  Lab 12/17/17  0540 12/18/17  0500    135*   K 3.7 3.8    101   CO2 23 22*   GLU 56* 69*   BUN 13 18   CREATININE 2.8* 4.2*   CALCIUM 8.3* 8.7   PROT 5.1* 4.9*   ALBUMIN 1.9* 1.9*   BILITOT 2.5* 2.5*   ALKPHOS 79 118   AST 21 20   ALT 5* 6*   ANIONGAP 12 12   EGFRNONAA 29.4* 18.0*       Significant Imaging: I have reviewed all pertinent imaging results/findings within the past 24 hours.     Ultrasound: There is a moderate sized loculated fluid collection in the right upper quadrant demonstrating septations which appears to be in contiguity with a more simple fluid collection in the right lower quadrant.    CT A/P: pending    Microbiology:  12/13 ascites cx: negative

## 2017-12-18 NOTE — ASSESSMENT & PLAN NOTE
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, CMV D+/R+, steroid induction, on maintenance tacro/MMF/pred; c/b seizures, LAURA, superificial wound infection s/p wound vac to [facility], and recent admission on 11/24 with culture negative peritonitis with WBC ~5000 with ~75% PMN's s/p broad coverage/augmentin course) who was admitted on 12/3/2017 with persistent peritonitis despite augmentin course likely due to indolent biliary leakage (with only a biliary stricture noted on ERCP 12/6 s/p sphincterotomy/biliary stent placement in CBD and abdominal drain placement 12/13/2017). His peritonitis has improved with stenting, drainage, and empiric broad coverage but he continues to have abdominal pain/distension on oral coverage concerning for ongoing infection with a RUQ loculated fluid collection noted on ultrasound as a likely source. He is stable while awaiting fluid collection drainage.    - would continue empiric oral cipro/flagyl and isavuconazole (also as mold prophylaxis) for coverage of infected fluid collection  - would continue other prophylaxis per protocol  - await drainage of fluid collection tomorrow -- please send for cell counts/diff, gram stain, bacterial cx, and fungal cx

## 2017-12-19 LAB
ABO + RH BLD: NORMAL
ALBUMIN SERPL BCP-MCNC: 2.7 G/DL
ALP SERPL-CCNC: 112 U/L
ALT SERPL W/O P-5'-P-CCNC: 6 U/L
ANION GAP SERPL CALC-SCNC: 12 MMOL/L
ANISOCYTOSIS BLD QL SMEAR: SLIGHT
AST SERPL-CCNC: 20 U/L
BASOPHILS # BLD AUTO: ABNORMAL K/UL
BASOPHILS NFR BLD: 1 %
BILIRUB SERPL-MCNC: 2.2 MG/DL
BLD GP AB SCN CELLS X3 SERPL QL: NORMAL
BUN SERPL-MCNC: 22 MG/DL
CALCIUM SERPL-MCNC: 9.1 MG/DL
CHLORIDE SERPL-SCNC: 99 MMOL/L
CO2 SERPL-SCNC: 22 MMOL/L
CREAT SERPL-MCNC: 5.6 MG/DL
CYCLOSPORINE BLD LC/MS/MS-MCNC: 161 NG/ML
DIFFERENTIAL METHOD: ABNORMAL
EOSINOPHIL # BLD AUTO: ABNORMAL K/UL
EOSINOPHIL NFR BLD: 0 %
ERYTHROCYTE [DISTWIDTH] IN BLOOD BY AUTOMATED COUNT: 15.1 %
EST. GFR  (AFRICAN AMERICAN): 14.7 ML/MIN/1.73 M^2
EST. GFR  (NON AFRICAN AMERICAN): 12.7 ML/MIN/1.73 M^2
GLUCOSE SERPL-MCNC: 78 MG/DL
HCT VFR BLD AUTO: 22.8 %
HCT VFR BLD AUTO: 23.5 %
HGB BLD-MCNC: 7.8 G/DL
HGB BLD-MCNC: 8.1 G/DL
HYPOCHROMIA BLD QL SMEAR: ABNORMAL
IMM GRANULOCYTES # BLD AUTO: ABNORMAL K/UL
IMM GRANULOCYTES NFR BLD AUTO: ABNORMAL %
LYMPHOCYTES # BLD AUTO: ABNORMAL K/UL
LYMPHOCYTES NFR BLD: 26 %
MAGNESIUM SERPL-MCNC: 2 MG/DL
MCH RBC QN AUTO: 30.8 PG
MCHC RBC AUTO-ENTMCNC: 34.2 G/DL
MCV RBC AUTO: 90 FL
MONOCYTES # BLD AUTO: ABNORMAL K/UL
MONOCYTES NFR BLD: 6 %
NEUTROPHILS NFR BLD: 67 %
NRBC BLD-RTO: 0 /100 WBC
OVALOCYTES BLD QL SMEAR: ABNORMAL
PHOSPHATE SERPL-MCNC: 5 MG/DL
PLATELET # BLD AUTO: 283 K/UL
PMV BLD AUTO: 9 FL
POIKILOCYTOSIS BLD QL SMEAR: SLIGHT
POLYCHROMASIA BLD QL SMEAR: ABNORMAL
POTASSIUM SERPL-SCNC: 3.8 MMOL/L
PROT SERPL-MCNC: 5.3 G/DL
RBC # BLD AUTO: 2.53 M/UL
SODIUM SERPL-SCNC: 133 MMOL/L
WBC # BLD AUTO: 2.56 K/UL

## 2017-12-19 PROCEDURE — 97608 NEG PRS WND THER NDME>50SQCM: CPT

## 2017-12-19 PROCEDURE — 25000003 PHARM REV CODE 250: Performed by: PHYSICIAN ASSISTANT

## 2017-12-19 PROCEDURE — 85027 COMPLETE CBC AUTOMATED: CPT

## 2017-12-19 PROCEDURE — 63600175 PHARM REV CODE 636 W HCPCS: Performed by: PHYSICIAN ASSISTANT

## 2017-12-19 PROCEDURE — P9047 ALBUMIN (HUMAN), 25%, 50ML: HCPCS | Performed by: NURSE PRACTITIONER

## 2017-12-19 PROCEDURE — 63600175 PHARM REV CODE 636 W HCPCS: Performed by: NURSE PRACTITIONER

## 2017-12-19 PROCEDURE — 25000003 PHARM REV CODE 250: Performed by: NURSE PRACTITIONER

## 2017-12-19 PROCEDURE — 84100 ASSAY OF PHOSPHORUS: CPT

## 2017-12-19 PROCEDURE — 90935 HEMODIALYSIS ONE EVALUATION: CPT | Mod: ,,, | Performed by: INTERNAL MEDICINE

## 2017-12-19 PROCEDURE — 86850 RBC ANTIBODY SCREEN: CPT

## 2017-12-19 PROCEDURE — 99900035 HC TECH TIME PER 15 MIN (STAT)

## 2017-12-19 PROCEDURE — 25000003 PHARM REV CODE 250: Performed by: STUDENT IN AN ORGANIZED HEALTH CARE EDUCATION/TRAINING PROGRAM

## 2017-12-19 PROCEDURE — 85018 HEMOGLOBIN: CPT

## 2017-12-19 PROCEDURE — 25000003 PHARM REV CODE 250: Performed by: INTERNAL MEDICINE

## 2017-12-19 PROCEDURE — 99233 SBSQ HOSP IP/OBS HIGH 50: CPT | Mod: ,,, | Performed by: INTERNAL MEDICINE

## 2017-12-19 PROCEDURE — 27000173 HC ACAPELLA DEVICE DH OR DM

## 2017-12-19 PROCEDURE — 96372 THER/PROPH/DIAG INJ SC/IM: CPT

## 2017-12-19 PROCEDURE — 80158 DRUG ASSAY CYCLOSPORINE: CPT

## 2017-12-19 PROCEDURE — 99233 SBSQ HOSP IP/OBS HIGH 50: CPT | Mod: 24,,, | Performed by: NURSE PRACTITIONER

## 2017-12-19 PROCEDURE — 94664 DEMO&/EVAL PT USE INHALER: CPT

## 2017-12-19 PROCEDURE — 83735 ASSAY OF MAGNESIUM: CPT

## 2017-12-19 PROCEDURE — 20600001 HC STEP DOWN PRIVATE ROOM

## 2017-12-19 PROCEDURE — 85014 HEMATOCRIT: CPT

## 2017-12-19 PROCEDURE — 80053 COMPREHEN METABOLIC PANEL: CPT

## 2017-12-19 PROCEDURE — 63600175 PHARM REV CODE 636 W HCPCS: Performed by: HOSPITALIST

## 2017-12-19 PROCEDURE — 85007 BL SMEAR W/DIFF WBC COUNT: CPT

## 2017-12-19 PROCEDURE — 90935 HEMODIALYSIS ONE EVALUATION: CPT

## 2017-12-19 PROCEDURE — 86900 BLOOD TYPING SEROLOGIC ABO: CPT

## 2017-12-19 RX ORDER — SODIUM CHLORIDE 9 MG/ML
INJECTION, SOLUTION INTRAVENOUS
Status: DISCONTINUED | OUTPATIENT
Start: 2017-12-19 | End: 2017-12-20 | Stop reason: HOSPADM

## 2017-12-19 RX ORDER — SODIUM CHLORIDE 9 MG/ML
INJECTION, SOLUTION INTRAVENOUS ONCE
Status: COMPLETED | OUTPATIENT
Start: 2017-12-19 | End: 2017-12-19

## 2017-12-19 RX ADMIN — LEVETIRACETAM 500 MG: 500 TABLET, FILM COATED ORAL at 09:12

## 2017-12-19 RX ADMIN — OXYCODONE HYDROCHLORIDE 10 MG: 5 TABLET ORAL at 10:12

## 2017-12-19 RX ADMIN — OXYCODONE HYDROCHLORIDE 10 MG: 5 TABLET ORAL at 04:12

## 2017-12-19 RX ADMIN — OXYCODONE HYDROCHLORIDE 10 MG: 5 TABLET ORAL at 02:12

## 2017-12-19 RX ADMIN — HEPARIN SODIUM 5000 UNITS: 5000 INJECTION, SOLUTION INTRAVENOUS; SUBCUTANEOUS at 08:12

## 2017-12-19 RX ADMIN — METRONIDAZOLE 500 MG: 500 TABLET ORAL at 04:12

## 2017-12-19 RX ADMIN — CIPROFLOXACIN HYDROCHLORIDE 500 MG: 500 TABLET, FILM COATED ORAL at 01:12

## 2017-12-19 RX ADMIN — SODIUM CHLORIDE 350 ML: 900 INJECTION, SOLUTION INTRAVENOUS at 05:12

## 2017-12-19 RX ADMIN — MIRTAZAPINE 7.5 MG: 7.5 TABLET ORAL at 08:12

## 2017-12-19 RX ADMIN — LEVETIRACETAM 500 MG: 500 TABLET, FILM COATED ORAL at 08:12

## 2017-12-19 RX ADMIN — DOCUSATE SODIUM 100 MG: 100 CAPSULE, LIQUID FILLED ORAL at 09:12

## 2017-12-19 RX ADMIN — HEPARIN SODIUM 5000 UNITS: 5000 INJECTION, SOLUTION INTRAVENOUS; SUBCUTANEOUS at 01:12

## 2017-12-19 RX ADMIN — HEPARIN SODIUM 1000 UNITS: 1000 INJECTION, SOLUTION INTRAVENOUS; SUBCUTANEOUS at 05:12

## 2017-12-19 RX ADMIN — ALBUMIN (HUMAN) 25 G: 12.5 SOLUTION INTRAVENOUS at 04:12

## 2017-12-19 RX ADMIN — URSODIOL 300 MG: 300 CAPSULE ORAL at 08:12

## 2017-12-19 RX ADMIN — CYCLOSPORINE 125 MG: 100 CAPSULE, LIQUID FILLED ORAL at 09:12

## 2017-12-19 RX ADMIN — ISAVUCONAZONIUM SULFATE 372 MG: 186 CAPSULE ORAL at 09:12

## 2017-12-19 RX ADMIN — OXYCODONE HYDROCHLORIDE 10 MG: 5 TABLET ORAL at 07:12

## 2017-12-19 RX ADMIN — LEVOTHYROXINE SODIUM 75 MCG: 50 TABLET ORAL at 04:12

## 2017-12-19 RX ADMIN — BISACODYL 10 MG: 5 TABLET, COATED ORAL at 08:12

## 2017-12-19 RX ADMIN — PANTOPRAZOLE SODIUM 40 MG: 40 TABLET, DELAYED RELEASE ORAL at 09:12

## 2017-12-19 RX ADMIN — DOCUSATE SODIUM 100 MG: 100 CAPSULE, LIQUID FILLED ORAL at 07:12

## 2017-12-19 RX ADMIN — ERYTHROPOIETIN 10000 UNITS: 10000 INJECTION, SOLUTION INTRAVENOUS; SUBCUTANEOUS at 05:12

## 2017-12-19 RX ADMIN — METRONIDAZOLE 500 MG: 500 TABLET ORAL at 08:12

## 2017-12-19 RX ADMIN — CYCLOSPORINE 150 MG: 100 CAPSULE, LIQUID FILLED ORAL at 07:12

## 2017-12-19 RX ADMIN — HEPARIN SODIUM 5000 UNITS: 5000 INJECTION, SOLUTION INTRAVENOUS; SUBCUTANEOUS at 04:12

## 2017-12-19 RX ADMIN — URSODIOL 300 MG: 300 CAPSULE ORAL at 09:12

## 2017-12-19 NOTE — ASSESSMENT & PLAN NOTE
- See peritonitis  - Blood/urine cx repeated 12/9 NGTD  -remains afebrile on cipro/flagyl combo per ID recommendations

## 2017-12-19 NOTE — PROGRESS NOTES
[facility]  Nephrology  Progress Note    Patient Name: Jhonny Diana  MRN: [id number]  Admission Date: 12/3/2017  Hospital Length of Stay: 16 days  Attending Provider: Solis Chapa MD   Primary Care Physician: Primary Doctor No  Principal Problem:Fever    Subjective:     HPI: Mr. Diana is a 28yo man w/a history of alcoholic cirrhosis s/p DDLT 10/19/2017; c/b seizures (swtiched off prograf to cyclo), ATN requiring HD (last HD 11/17), superificial wound infection s/p wound vac to chevron incision, and recent admit for fevers on 11/21 (discharged on empiric augmentin for suspected superficial wound infection) and readmitted 11/24 again with fever. Found to have peritonitis (WBC 5000, 75% PNM) neg for bile leak. He was treated initially with vanc/cefepime. Repeat cell counts 11/29 with some improvement (WBC 1400, 45% PNM). Pt transitioned to augmentin upon d/c 11/30.      Pt presented to the ED this morning with subjective fever (100.5) and general malaise beginning at 8pm yesterday. His abdomen is soft, but diffusely tender. Labs reveal elevated WBC count to 12.7 from 6.5 in addition to elevated tbili 3.5 from 2.3 when compared to labs 3 days ago, despite being on augmentin for recent diagnosis of peritonitis. He denies nausea, vomiting, diarrhea, congestion, cough, CP or SOB. He has received 1 L bolus NS in the ED.    Interval History:   Patient evaluated at bedside with mother present, hemodynamically stable, no respiratory distress. Total intake 1.5 L and output 320 cc.     Review of patient's allergies indicates:   Allergen Reactions    Bactrim [sulfamethoxazole-trimethoprim] Other (See Comments)     Mookie Trell Syndrome     Current Facility-Administered Medications   Medication Frequency    0.9%  NaCl infusion (for blood administration) Q24H PRN    0.9%  NaCl infusion PRN    0.9%  NaCl infusion Once    acetaminophen tablet 650 mg Q8H PRN    albumin human 25% bottle 25 g PRN    atovaquone  suspension 1,500 mg Daily    bisacodyl EC tablet 10 mg QHS    bisacodyl suppository 10 mg Daily PRN    bisacodyl suppository 10 mg Once    ciprofloxacin HCl tablet 500 mg Daily    cycloSPORINE modified capsule 150 mg BID    docusate sodium capsule 100 mg TID PC    epoetin maurisio injection 10,000 Units Every Tues, Thurs, Sat    fentaNYL injection Code/trauma/sedation Med    heparin (porcine) injection 1,000 Units PRN    heparin (porcine) injection 5,000 Units Q8H    isavuconazonium sulfate Cap 372 mg Daily    levETIRAcetam tablet 500 mg BID    levothyroxine tablet 75 mcg Before breakfast    metroNIDAZOLE tablet 500 mg Q8H    midazolam (VERSED) 1 mg/mL injection Code/trauma/sedation Med    midodrine tablet 5 mg PRN    mirtazapine tablet 7.5 mg QHS    mupirocin 2 % ointment BID    ondansetron disintegrating tablet 8 mg Q8H PRN    ondansetron injection 4 mg Q8H PRN    oxyCODONE immediate release tablet 10 mg Q3H PRN    oxyCODONE immediate release tablet 5 mg Q3H PRN    pantoprazole EC tablet 40 mg Daily    polyethylene glycol packet 17 g BID    simethicone chewable tablet 80 mg TID PRN    sodium chloride 0.9% flush 3 mL PRN    ursodiol capsule 300 mg BID       Objective:     Vital Signs (Most Recent):  Temp: 98.4 °F (36.9 °C) (12/19/17 1400)  Pulse: 98 (12/19/17 1500)  Resp: 18 (12/19/17 1545)  BP: 119/67 (12/19/17 1545)  SpO2: 96 % (12/19/17 1400)  O2 Device (Oxygen Therapy): room air (12/19/17 1109) Vital Signs (24h Range):  Temp:  [98.3 °F (36.8 °C)-99.7 °F (37.6 °C)] 98.4 °F (36.9 °C)  Pulse:  [] 98  Resp:  [18-20] 18  SpO2:  [91 %-98 %] 96 %  BP: (105-132)/(57-67) 119/67     Weight: 74 kg (163 lb 2.3 oz) (12/18/17 0615)  Body mass index is 26.33 kg/m².  Body surface area is 1.86 meters squared.    I/O last 3 completed shifts:  In: 2360 [P.O.:2360]  Out: 350 [Urine:40; Other:310]    Physical Exam   Constitutional: He is oriented to person, place, and time. He appears well-developed and  well-nourished. No distress.   HENT:   Head: Normocephalic and atraumatic.   Right Ear: External ear normal.   Left Ear: External ear normal.   Eyes: Conjunctivae and EOM are normal. Pupils are equal, round, and reactive to light.   Neck: Normal range of motion. Neck supple.   Cardiovascular: Normal rate, regular rhythm and normal heart sounds.    Pulmonary/Chest: Effort normal and breath sounds normal.   Abdominal: Soft. Bowel sounds are normal. He exhibits distension. There is tenderness. There is no rebound and no guarding.   Musculoskeletal: Normal range of motion. He exhibits no edema, tenderness or deformity.   Neurological: He is alert and oriented to person, place, and time.   Skin: No rash noted. No erythema.       Significant Labs:  CBC:   Recent Labs  Lab 12/19/17  0434 12/19/17  0627   WBC 2.56*  --    RBC 2.53*  --    HGB 7.8* 8.1*   HCT 22.8* 23.5*     --    MCV 90  --    MCH 30.8  --    MCHC 34.2  --      CMP:   Recent Labs  Lab 12/19/17  0434   GLU 78   CALCIUM 9.1   ALBUMIN 2.7*   PROT 5.3*   *   K 3.8   CO2 22*   CL 99   BUN 22*   CREATININE 5.6*   ALKPHOS 112   ALT 6*   AST 20   BILITOT 2.2*     All labs within the past 24 hours have been reviewed.       Assessment/Plan:     LAURA (acute kidney injury)    Jhonny Diana a 27 year old with acute alcoholic hepatis s/p OHLTx 10/19/17 course complicated by seizures, ATN, wound infection and peritonitis. He was admitted for peritonitis from 11/24-11/30 for peritonitis.     Plan:   - LAURA on CKD 4 (baseline sCr 3.9 - 4.0). Likely instrinsic etiology, multifactorial secondary to sepsis vs. Toxins   - Seems like he has become RRT dependant for now no evidence of renal recovery  - Patient will have HD today for removal of toxins and excess fluid, UF 1-2 L as tolerated by patient, maintain MAP> 65.   - Avoid nephrotoxic medication, renal dose medications and avoid contrast .   - Renal diet (low sodium, low phosphate and low potassium).   - Continue  with EPO with Hd.   - Patient had paracentesis yesterday and only had 280 cc removed.           Win Sandoval  Nephrology  Fellow  [facility]    Pager [phone]

## 2017-12-19 NOTE — PROGRESS NOTES
Wound improved. Continues to granulate and decrease in surface area.      12/19/17 1000       Incision/Site 10/30/17 0911 abdomen transverse   Date First Assessed/Time First Assessed: 10/30/17 0911   Present Prior to Hospital Arrival?: Yes  Location: abdomen  Orientation: transverse   Wound Image    Incision WDL ex   Dressing Appearance dry;intact   Appearance yellow;moist;granulating;pink   Periwound Area normal skin tone   Drainage Characteristics/Odor serous   Drainage Amount moderate   Wound Length (cm) 49   Wound Width (cm) 4   Depth (cm) 1.8   Undermining (depth (cm)/location) 0.8   Wound Edges open   Cleansed W/ sterile normal saline   Irrigated W/ sterile normal saline   Therapy Setting 125 mmHg;continuous therapy   Pressure Setting 125 mmHg   Negative Pressure Wound Therapy Dressing foam, black   Sponges Inserted 2   Black Sponges Inserted 2   Black Sponges Removed 2   Dressing Change Due 12/22/17     Continue same treatment  Zoë Murphy RN CWON  [id number]

## 2017-12-19 NOTE — SUBJECTIVE & OBJECTIVE
Interval History: No major change in symptoms. Afebrile.    Review of Systems   Constitutional: Negative for chills, fatigue and fever.   HENT: Negative for congestion, mouth sores and sinus pressure.    Respiratory: Negative for shortness of breath.    Gastrointestinal: Positive for abdominal distention and abdominal pain. Negative for diarrhea, nausea and vomiting.   Genitourinary: Negative for dysuria, hematuria and urgency.   Musculoskeletal: Negative for arthralgias and myalgias.   Skin: Negative for rash.     Objective:     Vital Signs (Most Recent):  Temp: 98.4 °F (36.9 °C) (12/19/17 1400)  Pulse: 98 (12/19/17 1500)  Resp: 18 (12/19/17 1445)  BP: 118/67 (12/19/17 1445)  SpO2: 96 % (12/19/17 1400) Vital Signs (24h Range):  Temp:  [98.3 °F (36.8 °C)-99.7 °F (37.6 °C)] 98.4 °F (36.9 °C)  Pulse:  [] 98  Resp:  [18-20] 18  SpO2:  [91 %-98 %] 96 %  BP: (105-132)/(57-67) 118/67     Weight: 74 kg (163 lb 2.3 oz)  Body mass index is 26.33 kg/m².    Estimated Creatinine Clearance: 17.7 mL/min (based on SCr of 5.6 mg/dL (H)).    Physical Exam   Constitutional: He is oriented to person, place, and time. He appears well-developed and well-nourished.   HENT:   Head: Normocephalic and atraumatic.   Eyes: Conjunctivae and EOM are normal. Pupils are equal, round, and reactive to light.   Neck: Normal range of motion. Neck supple.   Cardiovascular: Normal rate, regular rhythm and normal heart sounds.    Pulmonary/Chest: Effort normal and breath sounds normal.   Abdominal: Soft. Bowel sounds are normal. He exhibits distension. There is tenderness. There is no rebound and no guarding.   Musculoskeletal: Normal range of motion. He exhibits no edema, tenderness or deformity.   Neurological: He is alert and oriented to person, place, and time.   Skin: No rash noted. No erythema.       Significant Labs:   CBC:     Recent Labs  Lab 12/18/17  0500 12/19/17  0434 12/19/17  0627   WBC 2.90* 2.56*  --    HGB 8.5* 7.8* 8.1*   HCT  25.2* 22.8* 23.5*    283  --      CMP:     Recent Labs  Lab 12/18/17  0500 12/19/17  0434   * 133*   K 3.8 3.8    99   CO2 22* 22*   GLU 69* 78   BUN 18 22*   CREATININE 4.2* 5.6*   CALCIUM 8.7 9.1   PROT 4.9* 5.3*   ALBUMIN 1.9* 2.7*   BILITOT 2.5* 2.2*   ALKPHOS 118 112   AST 20 20   ALT 6* 6*   ANIONGAP 12 12   EGFRNONAA 18.0* 12.7*       Significant Imaging: I have reviewed all pertinent imaging results/findings within the past 24 hours.     Ultrasound: There is a moderate sized loculated fluid collection in the right upper quadrant demonstrating septations which appears to be in contiguity with a more simple fluid collection in the right lower quadrant.    Microbiology:  12/13 ascites cx: negative  12/19 ascites cx: negative

## 2017-12-19 NOTE — PROGRESS NOTES
EZEKIEL notified that the [facility] HD center can expect the patient on Weds. Chano Novoa notified. EZEKIEL remains available.

## 2017-12-19 NOTE — DISCHARGE SUMMARY
[facility]  Liver Transplant  Discharge Summary      Patient Name: Jhonny Diana  MRN: [id number]  Admission Date: 12/3/2017  Hospital Length of Stay: 16 days  Discharge Date and Time:  2017 4:28 PM  Attending Physician: Solis Chapa MD   Discharging Provider: Seda Camacho NP  Primary Care Provider: Primary Doctor No  Post-Operative Day: 61     ORGAN:   LIVER  Disease Etiology: Acute Alcoholic Hepatitis  Donor Type:    - Brain Death  CDC High Risk:   No  Donor CMV Status:   Donor CMV Status: Positive  Donor HBcAB:   Negative  Donor HCV Status:   Negative  Whole or Partial: Whole Liver  Biliary Anastomosis: End to End  Arterial Anatomy: Standard    Procedure(s) (LRB):  ERCP (N/A)     Hospital Course:    Mr. Diana is a 26 y/o male with past medical history of alcoholic cirrhosis.  S/p DDLT 10/19/2017; c/b seizures (swtiched off prograf to cyclo), ATN requiring HD (last HD ), superficial wound infection s/p wound vac to chevron incision, and recent admit for fevers on  (discharged on empiric augmentin for suspected superficial wound infection) and readmitted  again with fever. Found to have peritonitis (WBC 5000, 75% PNM) neg for bile leak. He was treated initially with vanc/cefepime. Repeat cell counts  with some improvement (WBC 1400, 45% PNM). Pt transitioned to augmentin upon d/c .      Presented to the ED with subjective fever (100.5) and general malaise, abdominal pain, and leukocytosis, despite being on augmentin for recent diagnosis of peritonitis. Infectious work up and broad spectrum abx (vanc/cef) initiated. Has a wound vac in place as well. Still w(+) abdominal distention, pain and Tez LE edema on admit. Para on admit 17- with 3L removed ( down from 1500). ERCP was then performed on 17 as pt with hyperbilirubinemia and stricture seen on imaging. ERCP with stent placed, and bilirubin then with improvement. Ursodiol started  afterwards as well. Pt remains with abdominal pain but controlled with current regimen. PO intake remained poor afterwards. Mary Esther tube was placed for added nutrition and tube feeds were started. However, pt did not tolerated tube feeds. TPN then started. CT A/P 12/9 with signif ascites. IR para 12/11 with 4.5L removed with improvement in WBC. Zosyn/Vanc/Isavu continued at this time. A drain was placed 12/13 in IR with WBC diff negative for infection.  2.5 L total removed. Appetite somewhat improved, and TPN was d/c'd 12/15. He was then transitioned to oral antibiotics 12/15 (cipro and flagyl) per ID recs with end date of 12/25. He continued to complain of abdominal fullness and a liver u/s was obtained (12/16) showing a fluid collection. Pt was then sent to IR for drain placement 12/18, fluid negative for infection. Both drains have since been d/c'd and abdominal pain with some improvement, though pt reported fullness. A KUB obtained 12/17 showed a mild ileus.  Suppository was given with multiple bowel movements afterwards. Pt to continue bowel regimen of Colace and miralax. He is now eating better and TPN has since been discontinued. Cont to encourage increase in oral intake as prealbumin 11.  Remeron initiated for appetite stimulant. Of note, pt had slowly recovering renal function prior to admit but HD restarted upon this admission. He's making minimal urine output. Will remain on HD as outpatient on a MWF schedule. Next session today at 3PM. Pt now feeling much improved and without complaints this am. Pt noted with fever of 100.4 this am but no leukocytosis and reports feeling well. Will need to cont with antibxs for treatment. Repeat CMV PCR drawn today. Pt will d/c home today. F/u with transplant clinic as scheduled per transplant coordinator. F/u to include lab work on Friday 12/22/17. Will need to assess WBC and need to restart Cellcept at that time. Also plan for repeat TSH in approximately one month.      Wound vac changed to home wound vac prior to d/c.     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, 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] 12/07/2017 Yes    Seizure [R56.9] 11/02/2017 Yes    Severe malnutrition [E43] 12/05/2017 Yes    Hypothyroidism [E03.9] 12/08/2017 Yes    Anemia of chronic disease [D63.8] 11/18/2017 Yes    Protein-calorie malnutrition [E46] 12/14/2017 Yes    Hypervolemia [E87.70] 12/08/2017 Yes      Problems Resolved During this Admission:    Diagnosis Date Noted Date Resolved POA    Hyperbilirubinemia [E80.6] 12/12/2017 12/11/2017 Yes    Tachycardia [R00.0] 10/04/2017 12/05/2017 Yes       Consults         Status Ordering Provider     Inpatient consult to [facility] ([facility])  Once     Provider:  (Not yet assigned)    Completed MARCUS CASAREZ     Inpatient consult to Dietary  Once     Provider:  (Not yet assigned)    Completed ADIA MCLEOD     Inpatient consult to Endocrinology  Once     Provider:  (Not yet assigned)    Completed KEATON NIÑO     Inpatient consult to Infectious Diseases  Once     Provider:  (Not yet assigned)    Completed KATHERINE REY     Inpatient consult to Interventional Radiology  Once     Provider:  (Not yet assigned)    Completed KATHERINE REY     Inpatient consult to Interventional Radiology  Once     Provider:  (Not yet assigned)    KATHERINE Tubbs     Inpatient consult to  Nephrology  Once     Provider:  (Not yet assigned)    Completed OPPENHEIMER, JENNA M.     Inpatient [facility]  Once     Provider:  (Not yet assigned)    Completed FARZANEH MENDOZA     Inpatient consult to Psychology  Once     Provider:  (Not yet assigned)    Acknowledged KATHERINE REY          Pending Diagnostic Studies:     Procedure Component Value Units Date/Time    IR Abscess Drainage Peritoneal [[id number]]     Order Status:  Sent Lab Status:  No result     IR Ascess Drainage With Tube Placement [[id number]] Resulted:  12/13/17 0747    Order Status:  Sent Lab Status:  In process Updated:  12/13/17 0841        Significant Diagnostic Studies: Labs:   CMP   Recent Labs  Lab 12/18/17  0500 12/19/17  0434   * 133*   K 3.8 3.8    99   CO2 22* 22*   GLU 69* 78   BUN 18 22*   CREATININE 4.2* 5.6*   CALCIUM 8.7 9.1   PROT 4.9* 5.3*   ALBUMIN 1.9* 2.7*   BILITOT 2.5* 2.2*   ALKPHOS 118 112   AST 20 20   ALT 6* 6*   ANIONGAP 12 12   ESTGFRAFRICA 20.8* 14.7*   EGFRNONAA 18.0* 12.7*   , CBC   Recent Labs  Lab 12/18/17  0500 12/19/17  0434 12/19/17  0627   WBC 2.90* 2.56*  --    HGB 8.5* 7.8* 8.1*   HCT 25.2* 22.8* 23.5*    283  --     and INR   Lab Results   Component Value Date    INR 1.1 12/11/2017    INR 1.2 12/10/2017    INR 1.1 12/09/2017       The patients clinical status was discussed at multidisplinary rounds, involving transplant surgery, transplant medicine, pharmacy, nursing, nutrition, and social work    Discharged Condition: good    Disposition:     Follow Up:    Patient Instructions:   Referral to Home health   Referral Priority: Routine Referral Type: Home Health Care   Referral Reason: Specialty Services Required    Requested Specialty: Home Health Services    Number of Visits Requested: 1      Diet general     Activity as tolerated     Lifting restrictions   Order Comments: Do not lift anything greater than 10lbs for at least 6 weeks from transplant date.      Call MD for:  temperature >100.4     Call MD for:  persistent nausea and vomiting or diarrhea     Call MD for:  severe uncontrolled pain     Call MD for:  redness, tenderness, or signs of infection (pain, swelling, redness, odor or green/yellow discharge around incision site)     Call MD for:  difficulty breathing or increased cough     Call MD for:  severe persistent headache     Call MD for:  worsening rash     Call MD for:  persistent dizziness, light-headedness, or visual disturbances     Call MD for:  increased confusion or weakness     Call MD for:   Order Comments: For any concerning signs or symptoms.           Medications:  Reconciled Home Medications:   Current Discharge Medication List      CONTINUE these medications which have NOT CHANGED    Details   amoxicillin-clavulanate 500-125mg (AUGMENTIN) 500-125 mg Tab Take 1 tablet (500 mg total) by mouth 2 (two) times daily.  Qty: 28 tablet, Refills: 1      aspirin (ECOTRIN) 81 MG EC tablet Take 1 tablet (81 mg total) by mouth once daily.  Qty: 30 tablet, Refills: 5      atovaquone (MEPRON) 750 mg/5 mL Susp Take 10 mLs (1,500 mg total) by mouth once daily. Stop on 4/17/18  Qty: 300 mL, Refills: 5      cycloSPORINE modified, NEORAL, (NEORAL) 100 MG capsule Take 1 capsule (100 mg total) by mouth 2 (two) times daily.  Qty: 60 capsule, Refills: 11      docusate sodium (COLACE) 100 MG capsule Take 100 mg by mouth 3 (three) times daily as needed for Constipation.      ergocalciferol (ERGOCALCIFEROL) 50,000 unit Cap Take 1 capsule (50,000 Units total) by mouth every 7 days.  Qty: 4 capsule, Refills: 5      levETIRAcetam (KEPPRA) 500 MG Tab Take 1 tablet (500 mg total) by mouth 2 (two) times daily.  Qty: 60 tablet, Refills: 11      multivitamin (THERAGRAN) tablet Take 1 tablet by mouth once daily.  Qty: 30 tablet, Refills: 11      mycophenolate (CELLCEPT) 250 mg Cap Take 2 capsules (500 mg total) by mouth 2 (two) times daily.  Qty: 240 capsule, Refills: 2       ondansetron (ZOFRAN-ODT) 8 MG TbDL Take 1 tablet (8 mg total) by mouth every 8 (eight) hours as needed (nausea).  Qty: 30 tablet, Refills: 0    Associated Diagnoses: Post-operative nausea and vomiting      oxyCODONE-acetaminophen (PERCOCET)  mg per tablet Take 0.5-1 tablets by mouth every 4 (four) hours as needed for Pain.  Qty: 40 tablet, Refills: 0      pantoprazole (PROTONIX) 40 MG tablet Take 1 tablet (40 mg total) by mouth once daily.  Qty: 30 tablet, Refills: 5      predniSONE (DELTASONE) 10 MG tablet Take 20mg PO QD 10/29-11/4; 15mg PO QD 11/5-11/11; 10mg PO QD 11/12-11/18; 5mg PO QD 11/19-11/25; 2.5mg PO QD 11/26-12/2; stop on 12/2/17  Qty: 60 tablet, Refills: 0      sodium bicarbonate 650 MG tablet Take 2 tablets (1,300 mg total) by mouth 2 (two) times daily.  Qty: 120 tablet, Refills: 2      thiamine 100 MG tablet Take 1 tablet (100 mg total) by mouth once daily.  Qty: 30 tablet, Refills: 6      valGANciclovir (VALCYTE) 450 mg Tab Take 1 tablet (450 mg total) by mouth every Mon, Wed, Fri. Stop on 1/17/18  Qty: 12 tablet, Refills: 2           Time spent caring for patient (Greater than 1/2 spent in direct face-to-face contact): > 30 minutes    Seda Camacho NP  Liver Transplant  [facility]

## 2017-12-19 NOTE — PROGRESS NOTES
Updated information fax to outpt GISELLE at [facility] fax# [phone]. SW remains available.

## 2017-12-19 NOTE — PROGRESS NOTES
[facility]  Liver Transplant  Progress Note    Patient Name: Jhonny Diana  MRN: [id number]  Admission Date: 12/3/2017  Hospital Length of Stay: 16 days  Code Status: Full Code  Primary Care Provider: Primary Doctor No  Post-Operative Day: 61    ORGAN:   LIVER  Disease Etiology: Acute Alcoholic Hepatitis  Donor Type:    - Brain Death  CDC High Risk:   No  Donor CMV Status:   Donor CMV Status: Positive  Donor HBcAB:   Negative  Donor HCV Status:   Negative  Whole or Partial: Whole Liver  Biliary Anastomosis: End to End  Arterial Anatomy: Standard  Subjective:     History of Present Illness:  Mr. Diana is a 26yo man w/a history of alcoholic cirrhosis s/p DDLT 10/19/2017; c/b seizures (swtiched off prograf to cyclo), ATN requiring HD (last HD ), superificial wound infection s/p wound vac to chevron incision, and recent admit for fevers on  (discharged on empiric augmentin for suspected superficial wound infection) and readmitted  again with fever. Found to have peritonitis (WBC 5000, 75% PNM) neg for bile leak. He was treated initially with vanc/cefepime. Repeat cell counts  with some improvement (WBC 1400, 45% PNM). Pt transitioned to augmentin upon d/c .      Pt presented to the ED 12/3 morning with subjective fever (100.5) and general malaise beginning at 8pm day prior. His abdomen soft, but diffusely tender. Labs reveal elevated WBC count to 12.7 from 6.5 in addition to elevated tbili 3.5 from 2.3 when compared to labs 3 days ago, despite being on augmentin for recent diagnosis of peritonitis. He denied nausea, vomiting, diarrhea, congestion, cough, CP or SOB. He received 1 L bolus NS in the ED. Infectious mosley intiated, BS abx (vanc/cef) started, and obtained CT abdomen.     He has a wound vac in place.     Hospital Course:  Interval History:      : Pt in slightly better spirits today, joking with team. Pt remains with abdominal pain which is improving as  peritonitis continues to resolve (last para ). Bili trending down at 5.2 today, cont ursodiol. PO intake remains poor, will place SHANE tube and initiate tube feeds today, orders placed. Will give albumin x1. Per nephrology will plan for HD today. H/H low on morning labs will plan for 1 unit PRBC with HD. Of note, TSH checked yesterday, level of 11.4 with T4 <3.0, endo consulted- will start levothyroxine today. Cont to encourage PO intake and ambulation. PT ordered 12/7/17, unable to see patient today - will work with pt this weekend. Monitor.     12/9:  Pt with signif nausea and abd pain this AM.  D/c TF.  Place central line and will initiate TPN.  CT A/P obtained today with signif ascites.  Plan IR vs washout in OR.  ID consulted and recommend washout.  Pt transitioned to Zosyn and started on isavu.  Will change PO meds to shane as much as able.      12/10:  Pt with signif improvement today in nausea and feels hungry.  CT A/P 12/9 with signif ascites.  Plan for IR para with drain placement in AM.  Continue Zosyn/Vanc/Isavu.  Will reorder TPN.  D/c shane in AM if not going to transition back to TF.    12/11:  D/c shane.  Continue TPN.  Nausea still improved.  Continue current abx.  Para today with 4.5 L removed.  HD cancelled.  Ascitic fluid labs pending.  Pt NPO after midnight.  Blood/urine cx 12/9 NGTD.    12/12:  Cell count negative from para yesterday.  Pt without nausea today- was able to eat a small amount.  Calorie count initiated.  Plan for IR drain placement tomorrow.  HD today with blood transfusion.    12/13:  Drain placed in IR this AM with WBC/diff negative for infection.  2.5 L total removed- replaced with albumin 25% 100 cc.  Will type and screen tomorrow and tx if < 8 in HD.  Remains slightly tachy/hypotensive but improved.  Eating minimally- reorder TPN.      12/14: no acute events over night.  Drain output decreased this am.  Reports eating better per patient and mother.  Continue current abx &  "tpn.    12/15: HD 12/14.  Wound vac dressing changed today.  Continue to encourage oral intake.  Stop TPN.  Transition to oral antibiotics today per ID recs. Likely will need transfusion with HD in am.    12/16/17: Transfuse 1 unit prbc with hd today.  No acute events over night.  Continues to report "feeling fullness."  Will obtain abdominal/liver ultrasound to assess for fluid collections.    12/17:  Blood counts responded appropriately to transfusion.  Possible IR drainage in am for fluid collection noted on ultrasound.  Obtain KUB to assess for ileus- give suppository.  Consider starting appetite stimulant.    12/18: plan IR drainage today of fluid collection on right side seen on ultrasound.  If fluid not infected plan to d/c both drains in am.    12/19: Appetite improved.  Continue remeron for appetite stimulant.  Both drains removed yesterday without complication.  HD and wound vac dressing change today.  Possible d/c in am if eating well.    Scheduled Meds:   sodium chloride 0.9%   Intravenous Once    atovaquone  1,500 mg Oral Daily    bisacodyl  10 mg Oral QHS    bisacodyl  10 mg Rectal Once    ciprofloxacin HCl  500 mg Oral Daily    cycloSPORINE modified (NEORAL)  150 mg Oral BID    docusate sodium  100 mg Oral TID PC    epoetin maurisio (PROCRIT) injection  10,000 Units Intravenous Every Tues, Thurs, Sat    heparin (porcine)  5,000 Units Subcutaneous Q8H    isavuconazonium sulfate  372 mg Oral Daily    levETIRAcetam  500 mg Oral BID    levothyroxine  75 mcg Oral Before breakfast    metroNIDAZOLE  500 mg Oral Q8H    mirtazapine  7.5 mg Oral QHS    mupirocin   Topical (Top) BID    pantoprazole  40 mg Oral Daily    polyethylene glycol  17 g Oral BID    ursodiol  300 mg Oral BID     Continuous Infusions:    PRN Meds:sodium chloride, sodium chloride 0.9%, acetaminophen, albumin human 25%, bisacodyl, fentaNYL, heparin (porcine), midazolam, midodrine, ondansetron, ondansetron, oxyCODONE, oxyCODONE, " simethicone, sodium chloride 0.9%    Review of Systems   Constitutional: Positive for activity change and appetite change. Negative for chills, fatigue and fever.   Respiratory: Negative for cough, shortness of breath and wheezing.    Cardiovascular: Positive for leg swelling.   Gastrointestinal: Positive for abdominal distention, abdominal pain and constipation. Negative for nausea and vomiting.   Genitourinary: Negative for difficulty urinating and dysuria.   Skin: Positive for wound.   Allergic/Immunologic: Positive for immunocompromised state.   Neurological: Positive for weakness.   Psychiatric/Behavioral: Negative for confusion and decreased concentration. The patient is not nervous/anxious.      Objective:     Vital Signs (Most Recent):  Temp: 99.7 °F (37.6 °C) (12/19/17 1109)  Pulse: 107 (12/19/17 1109)  Resp: 18 (12/19/17 1109)  BP: 111/60 (12/19/17 1109)  SpO2: 95 % (12/19/17 1109) Vital Signs (24h Range):  Temp:  [98.3 °F (36.8 °C)-99.7 °F (37.6 °C)] 99.7 °F (37.6 °C)  Pulse:  [] 107  Resp:  [16-20] 18  SpO2:  [91 %-98 %] 95 %  BP: (105-118)/(57-65) 111/60     Weight: 74 kg (163 lb 2.3 oz)  Body mass index is 26.33 kg/m².    Intake/Output - Last 3 Shifts       12/17 0700 - 12/18 0659 12/18 0700 - 12/19 0659 12/19 0700 - 12/20 0659    P.O. 1020 1580 250    Other       Total Intake(mL/kg) 1020 (13.8) 1580 (21.4) 250 (3.4)    Urine (mL/kg/hr) 0 (0) 40 (0)     Other 55 (0) 280 (0.2)     Stool 0 (0) 0 (0)     Total Output 55 320      Net +965 +1260 +250           Urine Occurrence 0 x 0 x     Stool Occurrence 1 x 1 x           Physical Exam   Constitutional: He is oriented to person, place, and time. He appears well-developed. No distress.   Temporal and distal extremity muscle wasting   HENT:   Head: Normocephalic and atraumatic.   Eyes: No scleral icterus.   Neck: Normal range of motion. Neck supple. No JVD present.   Cardiovascular: Normal rate, regular rhythm, normal heart sounds and intact distal  pulses.    No murmur heard.  Pulmonary/Chest: Effort normal. No respiratory distress. He has decreased breath sounds in the right middle field, the right lower field and the left lower field. He has no wheezes. He exhibits no tenderness.   Abdominal: Soft. He exhibits distension. There is tenderness (diffuse). There is no rebound and no guarding.   Wound vac to incision   Musculoskeletal: Normal range of motion. He exhibits edema (2+ LE edema). He exhibits no tenderness.   Neurological: He is alert and oriented to person, place, and time. He has normal reflexes.   Skin: Skin is warm and dry. He is not diaphoretic.   Psychiatric: His behavior is normal. Judgment and thought content normal. His mood appears anxious.   Pt anxious and expressing feeling overwhelmed   Nursing note and vitals reviewed.      Laboratory:  Immunosuppressants         Stop Route Frequency     cycloSPORINE modified capsule 150 mg      -- Oral 2 times daily        CBC:     Recent Labs  Lab 12/19/17  0434 12/19/17  0627   WBC 2.56*  --    RBC 2.53*  --    HGB 7.8* 8.1*   HCT 22.8* 23.5*     --    MCV 90  --    MCH 30.8  --    MCHC 34.2  --      CMP:     Recent Labs  Lab 12/19/17  0434   GLU 78   CALCIUM 9.1   ALBUMIN 2.7*   PROT 5.3*   *   K 3.8   CO2 22*   CL 99   BUN 22*   CREATININE 5.6*   ALKPHOS 112   ALT 6*   AST 20   BILITOT 2.2*     Coagulation:   No results for input(s): PT, INR, APTT in the last 168 hours.  Cardiac Markers: No results for input(s): CKMB, TROPONINT, MYOGLOBIN in the last 168 hours.  Labs within the past 24 hours have been reviewed.    Diagnostic Results:  I have personally reviewed all pertinent imaging studies.    Assessment/Plan:     * Fever    - See peritonitis  - Blood/urine cx repeated 12/9 NGTD  -remains afebrile on cipro/flagyl combo per ID recommendations          Peritonitis    - Diagnosed on previous admission.  Last para 11/29 with improving cell counts WBC 1400 with 45% PNMs   Was d/c'ed on  augmentin, switched to vanc/cef at admit  - Repeat paracentesis 12/5/17, with  (improving from 1497)  - CT A/P repeated 12/9 with signif ascites  - Abx transitioned to Zosyn/Vanc and Isavu added 12/9 with signif clinical improvement  - Para 12/11 with 4.5 L removed - cell ct signif improved and negative for infection  - IR drain placed 1213 to drain remainder of ascites --> 2.5 L removed and replaced albumin 25% 100 cc x 2.  Cell count negative for infection  - discontinue zosyn/vanc/isavu, transitioned to oral cipro/flagyl 12/15/17  -repeat abd/liver u/s 12/16 with undrained fluid collection,  - IR drained 250cc - labs negative for infection, drains removed 12/18  - continue oral antibiotics x 7 more days          LAURA (acute kidney injury)    - Creatinine significantly elevated on 12/6 and HD resumed- was not on HD at admit as kidney function had improved.   - HD last 12/16.  - UOP has trended down daily and is now minimal.  - Cont strict I/O's.             Liver transplanted    - A/w fevers and hyperbilirubinemia.   - LFTs improving.  - PBS consulted. ERCP attempted on 12/4 but unable to cannulate. MRCP on 12/4 noted with stricture.  - ERCP 12/6/17  With post-anastamosis stricture, stent placed.   - Para 12/5/17, 3L removed ( down from 1497, cr 5.2, bili 2.5)  - Para 12/11/17, 4.5L removed ( )  - para with drain placement 12/13 (, SEgs 25)  - para 12/18 (WBC 9, SEgs 15)          Delayed surgical wound healing    - Wound vac in place, last changed 12/15 and healing well  - wound care following, dressing changes Tues/Friday        Long-term use of immunosuppressant medication    - Continue cyclosporine  - Hold Cellcept for infection  - Will monitor for signs of toxic side effects, check daily Cyclosporine troughs, and change meds accordingly.        Prophylactic immunotherapy    - See long term immunosuppressant          At risk for opportunistic infections    - hold valcyte as wbc low,  check cmv pcr (weekly surveillance)  - Continue Bactrim for PCP proph          Biliary stricture of transplanted liver    - Elevated t bili post liver txp in setting of abdominal pain, decreased appetitive and intermittent nausea.  PBS consulted to assess duct per ERCP.  - ERCP performed 12/5 PBS unable to cannulate. MRCP with stricture.  - ERCP 12/6/17 with post-anastamosis stricture with stent placed.   - Tbili peaked at 9.1, now trending down.   -started ursodiol 12/7/17.   - T bili has remained around 2 for last several days --> monitor closely.        Seizure    - Cont keppra          Severe malnutrition    - Pt malnourished with poor PO intake  - SHANE tube placed 12/8/17 and TF started  - Not tolerating TF --> central line placed and TPN started 12/9  - Remove shane 12/11  - starting appetite stimulant - pre albumin 11        Hypothyroidism    -TSH 11.4  -T4 ,3.0  - Endo consulted. Start levothyroxine 75mcg PO. Instructions for administration given. Appreciate recs.           Anemia of chronic disease    - 1 unit PRBC with HD 12/8/17  - 1 u PRBC with HD 12/12  - Discussed Fe studies with nephrology- will continue PRN transfusions as with minimal Fe stores  - transfused 1 unit prbc with HD 12/16  - Monitor CBC daily              VTE Risk Mitigation         Ordered     heparin (porcine) injection 5,000 Units  Every 8 hours     Route:  Subcutaneous        12/11/17 1126     heparin (porcine) injection 1,000 Units  As needed (PRN)     Route:  Intra-Catheter        12/08/17 1736     Medium Risk of VTE  Once      12/03/17 0249     Place sequential compression device  Until discontinued      12/03/17 0249          The patients clinical status was discussed at multidisplinary rounds, involving transplant surgery, transplant medicine, pharmacy, nursing, nutrition, and social work    Discharge Planning: possible d/c in am, if appetite improved  Monitor kidney function --> may need outpt HD  Monitor HH needs vs rehab for  deconditioned status      Seda Camacho NP  Liver Transplant  [facility]

## 2017-12-19 NOTE — PROGRESS NOTES
[facility]  Infectious Disease  Progress Note    Patient Name: Jhonny Diana  MRN: [id number]  Admission Date: 12/3/2017  Length of Stay: 16 days  Attending Physician: Solis Chapa MD  Primary Care Provider: Primary Doctor No    Isolation Status: No active isolations  Assessment/Plan:      Peritonitis    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, CMV D+/R+, steroid induction, on maintenance tacro/MMF/pred; c/b seizures, LAURA, superificial wound infection s/p wound vac to [facility], and recent admission on 11/24 with culture negative peritonitis with WBC ~5000 with ~75% PMN's s/p broad coverage/augmentin course) who was admitted on 12/3/2017 with persistent peritonitis despite augmentin course likely due to indolent biliary leakage (with only a biliary stricture noted on ERCP 12/6 s/p sphincterotomy/biliary stent placement in CBD and abdominal drain placement 12/13/2017). His peritonitis has improved with stenting, drainage, and empiric broad coverage and aspiration of a RUQ loculated fluid collection noted on ultrasound showed no evidence of active infection. He is currently stable.    - would continue empiric oral cipro/flagyl for an additional week total from drainage  - would continue isavuconazole as mold prophylaxis per protocol  - would continue other prophylaxis per protocol  - disposition per primary team            Anticipated Disposition: per primary team    Thank you for your consult. I will sign off. Please contact us if you have any additional questions.     Melanie Bergman MD  Transplant ID Attending  [id number]    Melanie Bergman MD  Infectious Disease  [facility]    Subjective:     Principal Problem:Fever    HPI: No notes on file  Interval History: No major change in symptoms. Afebrile.    Review of Systems   Constitutional: Negative for chills, fatigue and fever.   HENT: Negative for congestion,  mouth sores and sinus pressure.    Respiratory: Negative for shortness of breath.    Gastrointestinal: Positive for abdominal distention and abdominal pain. Negative for diarrhea, nausea and vomiting.   Genitourinary: Negative for dysuria, hematuria and urgency.   Musculoskeletal: Negative for arthralgias and myalgias.   Skin: Negative for rash.     Objective:     Vital Signs (Most Recent):  Temp: 98.4 °F (36.9 °C) (12/19/17 1400)  Pulse: 98 (12/19/17 1500)  Resp: 18 (12/19/17 1445)  BP: 118/67 (12/19/17 1445)  SpO2: 96 % (12/19/17 1400) Vital Signs (24h Range):  Temp:  [98.3 °F (36.8 °C)-99.7 °F (37.6 °C)] 98.4 °F (36.9 °C)  Pulse:  [] 98  Resp:  [18-20] 18  SpO2:  [91 %-98 %] 96 %  BP: (105-132)/(57-67) 118/67     Weight: 74 kg (163 lb 2.3 oz)  Body mass index is 26.33 kg/m².    Estimated Creatinine Clearance: 17.7 mL/min (based on SCr of 5.6 mg/dL (H)).    Physical Exam   Constitutional: He is oriented to person, place, and time. He appears well-developed and well-nourished.   HENT:   Head: Normocephalic and atraumatic.   Eyes: Conjunctivae and EOM are normal. Pupils are equal, round, and reactive to light.   Neck: Normal range of motion. Neck supple.   Cardiovascular: Normal rate, regular rhythm and normal heart sounds.    Pulmonary/Chest: Effort normal and breath sounds normal.   Abdominal: Soft. Bowel sounds are normal. He exhibits distension. There is tenderness. There is no rebound and no guarding.   Musculoskeletal: Normal range of motion. He exhibits no edema, tenderness or deformity.   Neurological: He is alert and oriented to person, place, and time.   Skin: No rash noted. No erythema.       Significant Labs:   CBC:     Recent Labs  Lab 12/18/17  0500 12/19/17  0434 12/19/17  0627   WBC 2.90* 2.56*  --    HGB 8.5* 7.8* 8.1*   HCT 25.2* 22.8* 23.5*    283  --      CMP:     Recent Labs  Lab 12/18/17  0500 12/19/17  0434   * 133*   K 3.8 3.8    99   CO2 22* 22*   GLU 69* 78   BUN 18  22*   CREATININE 4.2* 5.6*   CALCIUM 8.7 9.1   PROT 4.9* 5.3*   ALBUMIN 1.9* 2.7*   BILITOT 2.5* 2.2*   ALKPHOS 118 112   AST 20 20   ALT 6* 6*   ANIONGAP 12 12   EGFRNONAA 18.0* 12.7*       Significant Imaging: I have reviewed all pertinent imaging results/findings within the past 24 hours.     Ultrasound: There is a moderate sized loculated fluid collection in the right upper quadrant demonstrating septations which appears to be in contiguity with a more simple fluid collection in the right lower quadrant.    Microbiology:  12/13 ascites cx: negative  12/19 ascites cx: negative

## 2017-12-19 NOTE — ASSESSMENT & PLAN NOTE
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 He was admitted for peritonitis from 11/24-11/30 for peritonitis.     Plan:   - LAURA on CKD 4 (baseline sCr 3.9 - 4.0). Likely instrinsic etiology, multifactorial secondary to sepsis vs. Toxins   - Seems like he has become RRT dependant for now no evidence of renal recovery  - Patient will have HD today for removal of toxins and excess fluid, UF 1-2 L as tolerated by patient, maintain MAP> 65.   - Avoid nephrotoxic medication, renal dose medications and avoid contrast .   - Renal diet (low sodium, low phosphate and low potassium).   - Continue with EPO with Hd.   - Patient had paracentesis yesterday and only had 280 cc removed.

## 2017-12-19 NOTE — ASSESSMENT & PLAN NOTE
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+, steroid induction, on maintenance tacro/MMF/pred; c/b seizures, LAURA, superificial wound infection s/p wound vac to [facility], and recent admission on 11/24 with culture negative peritonitis with WBC ~5000 with ~75% PMN's s/p broad coverage/augmentin course) who was admitted on 12/3/2017 with persistent peritonitis despite augmentin course likely due to indolent biliary leakage (with only a biliary stricture noted on ERCP 12/6 s/p sphincterotomy/biliary stent placement in CBD and abdominal drain placement 12/13/2017). His peritonitis has improved with stenting, drainage, and empiric broad coverage and aspiration of a RUQ loculated fluid collection noted on ultrasound showed no evidence of active infection. He is currently stable.    - would continue empiric oral cipro/flagyl for an additional week total from drainage  - would continue isavuconazole as mold prophylaxis per protocol  - would continue other prophylaxis per protocol  - disposition per primary team

## 2017-12-19 NOTE — ASSESSMENT & PLAN NOTE
- Diagnosed on previous admission.  Last para 11/29 with improving cell counts WBC 1400 with 45% PNMs   Was d/c'ed on augmentin, switched to vanc/cef at admit  - Repeat paracentesis 12/5/17, with  (improving from 1497)  - CT A/P repeated 12/9 with signif ascites  - Abx transitioned to Zosyn/Vanc and Isavu added 12/9 with signif clinical improvement  - Para 12/11 with 4.5 L removed - cell ct signif improved and negative for infection  - IR drain placed 1213 to drain remainder of ascites --> 2.5 L removed and replaced albumin 25% 100 cc x 2.  Cell count negative for infection  - discontinue zosyn/vanc/isavu, transitioned to oral cipro/flagyl 12/15/17  -repeat abd/liver u/s 12/16 with undrained fluid collection,  - IR drained 250cc - labs negative for infection, drains removed 12/18  - continue oral antibiotics x 7 more days

## 2017-12-19 NOTE — PROGRESS NOTES
EZEKIEL received a call from Chano with [facility] [place], [place] confirming the patient chair time and date has not changed. Chano reported that the patient will remain on MWF for 3pm.  EZEKIEL to update the patient at discharge.

## 2017-12-19 NOTE — SUBJECTIVE & OBJECTIVE
Scheduled Meds:   sodium chloride 0.9%   Intravenous Once    atovaquone  1,500 mg Oral Daily    bisacodyl  10 mg Oral QHS    bisacodyl  10 mg Rectal Once    ciprofloxacin HCl  500 mg Oral Daily    cycloSPORINE modified (NEORAL)  150 mg Oral BID    docusate sodium  100 mg Oral TID PC    epoetin maurisio (PROCRIT) injection  10,000 Units Intravenous Every Tues, Thurs, Sat    heparin (porcine)  5,000 Units Subcutaneous Q8H    isavuconazonium sulfate  372 mg Oral Daily    levETIRAcetam  500 mg Oral BID    levothyroxine  75 mcg Oral Before breakfast    metroNIDAZOLE  500 mg Oral Q8H    mirtazapine  7.5 mg Oral QHS    mupirocin   Topical (Top) BID    pantoprazole  40 mg Oral Daily    polyethylene glycol  17 g Oral BID    ursodiol  300 mg Oral BID     Continuous Infusions:    PRN Meds:sodium chloride, sodium chloride 0.9%, acetaminophen, albumin human 25%, bisacodyl, fentaNYL, heparin (porcine), midazolam, midodrine, ondansetron, ondansetron, oxyCODONE, oxyCODONE, simethicone, sodium chloride 0.9%    Review of Systems   Constitutional: Positive for activity change and appetite change. Negative for chills, fatigue and fever.   Respiratory: Negative for cough, shortness of breath and wheezing.    Cardiovascular: Positive for leg swelling.   Gastrointestinal: Positive for abdominal distention, abdominal pain and constipation. Negative for nausea and vomiting.   Genitourinary: Negative for difficulty urinating and dysuria.   Skin: Positive for wound.   Allergic/Immunologic: Positive for immunocompromised state.   Neurological: Positive for weakness.   Psychiatric/Behavioral: Negative for confusion and decreased concentration. The patient is not nervous/anxious.      Objective:     Vital Signs (Most Recent):  Temp: 99.7 °F (37.6 °C) (12/19/17 1109)  Pulse: 107 (12/19/17 1109)  Resp: 18 (12/19/17 1109)  BP: 111/60 (12/19/17 1109)  SpO2: 95 % (12/19/17 1109) Vital Signs (24h Range):  Temp:  [98.3 °F (36.8 °C)-99.7  °F (37.6 °C)] 99.7 °F (37.6 °C)  Pulse:  [] 107  Resp:  [16-20] 18  SpO2:  [91 %-98 %] 95 %  BP: (105-118)/(57-65) 111/60     Weight: 74 kg (163 lb 2.3 oz)  Body mass index is 26.33 kg/m².    Intake/Output - Last 3 Shifts       12/17 0700 - 12/18 0659 12/18 0700 - 12/19 0659 12/19 0700 - 12/20 0659    P.O. 1020 1580 250    Other       Total Intake(mL/kg) 1020 (13.8) 1580 (21.4) 250 (3.4)    Urine (mL/kg/hr) 0 (0) 40 (0)     Other 55 (0) 280 (0.2)     Stool 0 (0) 0 (0)     Total Output 55 320      Net +965 +1260 +250           Urine Occurrence 0 x 0 x     Stool Occurrence 1 x 1 x           Physical Exam   Constitutional: He is oriented to person, place, and time. He appears well-developed. No distress.   Temporal and distal extremity muscle wasting   HENT:   Head: Normocephalic and atraumatic.   Eyes: No scleral icterus.   Neck: Normal range of motion. Neck supple. No JVD present.   Cardiovascular: Normal rate, regular rhythm, normal heart sounds and intact distal pulses.    No murmur heard.  Pulmonary/Chest: Effort normal. No respiratory distress. He has decreased breath sounds in the right middle field, the right lower field and the left lower field. He has no wheezes. He exhibits no tenderness.   Abdominal: Soft. He exhibits distension. There is tenderness (diffuse). There is no rebound and no guarding.   Wound vac to incision   Musculoskeletal: Normal range of motion. He exhibits edema (2+ LE edema). He exhibits no tenderness.   Neurological: He is alert and oriented to person, place, and time. He has normal reflexes.   Skin: Skin is warm and dry. He is not diaphoretic.   Psychiatric: His behavior is normal. Judgment and thought content normal. His mood appears anxious.   Pt anxious and expressing feeling overwhelmed   Nursing note and vitals reviewed.      Laboratory:  Immunosuppressants         Stop Route Frequency     cycloSPORINE modified capsule 150 mg      -- Oral 2 times daily        CBC:     Recent  Labs  Lab 12/19/17  0434 12/19/17  0627   WBC 2.56*  --    RBC 2.53*  --    HGB 7.8* 8.1*   HCT 22.8* 23.5*     --    MCV 90  --    MCH 30.8  --    MCHC 34.2  --      CMP:     Recent Labs  Lab 12/19/17  0434   GLU 78   CALCIUM 9.1   ALBUMIN 2.7*   PROT 5.3*   *   K 3.8   CO2 22*   CL 99   BUN 22*   CREATININE 5.6*   ALKPHOS 112   ALT 6*   AST 20   BILITOT 2.2*     Coagulation:   No results for input(s): PT, INR, APTT in the last 168 hours.  Cardiac Markers: No results for input(s): CKMB, TROPONINT, MYOGLOBIN in the last 168 hours.  Labs within the past 24 hours have been reviewed.    Diagnostic Results:  I have personally reviewed all pertinent imaging studies.

## 2017-12-19 NOTE — PLAN OF CARE
Problem: Patient Care Overview  Goal: Plan of Care Review  Outcome: Ongoing (interventions implemented as appropriate)  Tmax 99.7 today. Prealbumin 11 - calorie count still in progress, patient's mother recording intake. Wound vac dressing changed today per wound care. Patient still with c/o pain - receiving oxycodone IR 10mg Q3H prn. Currently in HD. Discharge tentatively scheduled for tomorrow if patient's PO intake is adequate.

## 2017-12-19 NOTE — PROGRESS NOTES
Patient received from floor with report from Rizwana Ty. Maintenance dialysis began per orders via right IJ tunneled catheter.

## 2017-12-19 NOTE — SUBJECTIVE & OBJECTIVE
Interval History:   Patient evaluated at bedside with mother present, hemodynamically stable, no respiratory distress. Total intake 1.5 L and output 320 cc.     Review of patient's allergies indicates:   Allergen Reactions    Bactrim [sulfamethoxazole-trimethoprim] Other (See Comments)     Mookie Trell Syndrome     Current Facility-Administered Medications   Medication Frequency    0.9%  NaCl infusion (for blood administration) Q24H PRN    0.9%  NaCl infusion PRN    0.9%  NaCl infusion Once    acetaminophen tablet 650 mg Q8H PRN    albumin human 25% bottle 25 g PRN    atovaquone suspension 1,500 mg Daily    bisacodyl EC tablet 10 mg QHS    bisacodyl suppository 10 mg Daily PRN    bisacodyl suppository 10 mg Once    ciprofloxacin HCl tablet 500 mg Daily    cycloSPORINE modified capsule 150 mg BID    docusate sodium capsule 100 mg TID PC    epoetin maurisio injection 10,000 Units Every Tues, Thurs, Sat    fentaNYL injection Code/trauma/sedation Med    heparin (porcine) injection 1,000 Units PRN    heparin (porcine) injection 5,000 Units Q8H    isavuconazonium sulfate Cap 372 mg Daily    levETIRAcetam tablet 500 mg BID    levothyroxine tablet 75 mcg Before breakfast    metroNIDAZOLE tablet 500 mg Q8H    midazolam (VERSED) 1 mg/mL injection Code/trauma/sedation Med    midodrine tablet 5 mg PRN    mirtazapine tablet 7.5 mg QHS    mupirocin 2 % ointment BID    ondansetron disintegrating tablet 8 mg Q8H PRN    ondansetron injection 4 mg Q8H PRN    oxyCODONE immediate release tablet 10 mg Q3H PRN    oxyCODONE immediate release tablet 5 mg Q3H PRN    pantoprazole EC tablet 40 mg Daily    polyethylene glycol packet 17 g BID    simethicone chewable tablet 80 mg TID PRN    sodium chloride 0.9% flush 3 mL PRN    ursodiol capsule 300 mg BID       Objective:     Vital Signs (Most Recent):  Temp: 98.4 °F (36.9 °C) (12/19/17 1400)  Pulse: 98 (12/19/17 1500)  Resp: 18 (12/19/17 1545)  BP: 119/67  (12/19/17 1545)  SpO2: 96 % (12/19/17 1400)  O2 Device (Oxygen Therapy): room air (12/19/17 1109) Vital Signs (24h Range):  Temp:  [98.3 °F (36.8 °C)-99.7 °F (37.6 °C)] 98.4 °F (36.9 °C)  Pulse:  [] 98  Resp:  [18-20] 18  SpO2:  [91 %-98 %] 96 %  BP: (105-132)/(57-67) 119/67     Weight: 74 kg (163 lb 2.3 oz) (12/18/17 0615)  Body mass index is 26.33 kg/m².  Body surface area is 1.86 meters squared.    I/O last 3 completed shifts:  In: 2360 [P.O.:2360]  Out: 350 [Urine:40; Other:310]    Physical Exam   Constitutional: He is oriented to person, place, and time. He appears well-developed and well-nourished. No distress.   HENT:   Head: Normocephalic and atraumatic.   Right Ear: External ear normal.   Left Ear: External ear normal.   Eyes: Conjunctivae and EOM are normal. Pupils are equal, round, and reactive to light.   Neck: Normal range of motion. Neck supple.   Cardiovascular: Normal rate, regular rhythm and normal heart sounds.    Pulmonary/Chest: Effort normal and breath sounds normal.   Abdominal: Soft. Bowel sounds are normal. He exhibits distension. There is tenderness. There is no rebound and no guarding.   Musculoskeletal: Normal range of motion. He exhibits no edema, tenderness or deformity.   Neurological: He is alert and oriented to person, place, and time.   Skin: No rash noted. No erythema.       Significant Labs:  CBC:   Recent Labs  Lab 12/19/17  0434 12/19/17  0627   WBC 2.56*  --    RBC 2.53*  --    HGB 7.8* 8.1*   HCT 22.8* 23.5*     --    MCV 90  --    MCH 30.8  --    MCHC 34.2  --      CMP:   Recent Labs  Lab 12/19/17 0434   GLU 78   CALCIUM 9.1   ALBUMIN 2.7*   PROT 5.3*   *   K 3.8   CO2 22*   CL 99   BUN 22*   CREATININE 5.6*   ALKPHOS 112   ALT 6*   AST 20   BILITOT 2.2*     All labs within the past 24 hours have been reviewed.

## 2017-12-19 NOTE — PROGRESS NOTES
EZEKIEL met with the patient and mom at the pts bedside to discuss discharge plans for tentative dc tomorrow.  The patient was alert, oriented and communicative.  The mother of the pt was observed to be sitting bedside watching television.  Both reported adequate coping.  EZEKIEL discussed with the patient recommendations that have been provided.  EZEKIEL discussed HH and outpt HD.  EZEKIEL explained this SW will speak with the unit regarding a holiday schedule and advise the patient on discharge.  EZEKIEL also explained that the recommendation is for the pt to resume HH with SN/PT.  The patient does not have any questions and reported he is excited to leave and hope he can stay out of the hospital for the holidays.      [place] [facility] [place] [phone].  EZEKIEL left a message for Chano Novoa, Clinical Director with [facility] [place] regarding the patient to discharge on tomorrow. EZEKIEL to fax over updated labs, MAR and HD report.      EZEKIEL contact SISSY Nava with [facility] ext [phone] to resume HH services for outpt PT and SN.  Brandy reported they will begin with the patient dc tomorrow.  EZEKIEL remains available.

## 2017-12-20 VITALS
HEIGHT: 66 IN | WEIGHT: 164.69 LBS | OXYGEN SATURATION: 96 % | DIASTOLIC BLOOD PRESSURE: 60 MMHG | RESPIRATION RATE: 17 BRPM | BODY MASS INDEX: 26.47 KG/M2 | SYSTOLIC BLOOD PRESSURE: 117 MMHG | TEMPERATURE: 99 F | HEART RATE: 97 BPM

## 2017-12-20 LAB
ALBUMIN SERPL BCP-MCNC: 2.2 G/DL
ALP SERPL-CCNC: 98 U/L
ALT SERPL W/O P-5'-P-CCNC: 5 U/L
ANION GAP SERPL CALC-SCNC: 9 MMOL/L
AST SERPL-CCNC: 19 U/L
BASOPHILS # BLD AUTO: 0.04 K/UL
BASOPHILS NFR BLD: 1.3 %
BILIRUB SERPL-MCNC: 1.9 MG/DL
BUN SERPL-MCNC: 8 MG/DL
CALCIUM SERPL-MCNC: 8.5 MG/DL
CHLORIDE SERPL-SCNC: 105 MMOL/L
CO2 SERPL-SCNC: 23 MMOL/L
CREAT SERPL-MCNC: 3.4 MG/DL
CYCLOSPORINE BLD LC/MS/MS-MCNC: 399 NG/ML
DIFFERENTIAL METHOD: ABNORMAL
EOSINOPHIL # BLD AUTO: 0 K/UL
EOSINOPHIL NFR BLD: 0.6 %
ERYTHROCYTE [DISTWIDTH] IN BLOOD BY AUTOMATED COUNT: 15.4 %
EST. GFR  (AFRICAN AMERICAN): 26.8 ML/MIN/1.73 M^2
EST. GFR  (NON AFRICAN AMERICAN): 23.2 ML/MIN/1.73 M^2
GLUCOSE SERPL-MCNC: 74 MG/DL
HCT VFR BLD AUTO: 24.7 %
HGB BLD-MCNC: 8.3 G/DL
IMM GRANULOCYTES # BLD AUTO: 0.07 K/UL
IMM GRANULOCYTES NFR BLD AUTO: 2.3 %
LYMPHOCYTES # BLD AUTO: 1.1 K/UL
LYMPHOCYTES NFR BLD: 34.3 %
MAGNESIUM SERPL-MCNC: 1.6 MG/DL
MCH RBC QN AUTO: 31.1 PG
MCHC RBC AUTO-ENTMCNC: 33.6 G/DL
MCV RBC AUTO: 93 FL
MONOCYTES # BLD AUTO: 0.8 K/UL
MONOCYTES NFR BLD: 26.9 %
NEUTROPHILS # BLD AUTO: 1.1 K/UL
NEUTROPHILS NFR BLD: 34.6 %
NRBC BLD-RTO: 0 /100 WBC
PHOSPHATE SERPL-MCNC: 2.3 MG/DL
PLATELET # BLD AUTO: 289 K/UL
PMV BLD AUTO: 9 FL
POTASSIUM SERPL-SCNC: 3.7 MMOL/L
PROT SERPL-MCNC: 4.8 G/DL
RBC # BLD AUTO: 2.67 M/UL
SODIUM SERPL-SCNC: 137 MMOL/L
WBC # BLD AUTO: 3.09 K/UL

## 2017-12-20 PROCEDURE — 25000003 PHARM REV CODE 250: Performed by: PHYSICIAN ASSISTANT

## 2017-12-20 PROCEDURE — 25000003 PHARM REV CODE 250: Performed by: STUDENT IN AN ORGANIZED HEALTH CARE EDUCATION/TRAINING PROGRAM

## 2017-12-20 PROCEDURE — 83735 ASSAY OF MAGNESIUM: CPT

## 2017-12-20 PROCEDURE — 94761 N-INVAS EAR/PLS OXIMETRY MLT: CPT

## 2017-12-20 PROCEDURE — 63600175 PHARM REV CODE 636 W HCPCS: Performed by: NURSE PRACTITIONER

## 2017-12-20 PROCEDURE — 99239 HOSP IP/OBS DSCHRG MGMT >30: CPT | Mod: 24,,, | Performed by: NURSE PRACTITIONER

## 2017-12-20 PROCEDURE — 99900035 HC TECH TIME PER 15 MIN (STAT)

## 2017-12-20 PROCEDURE — 94664 DEMO&/EVAL PT USE INHALER: CPT

## 2017-12-20 PROCEDURE — 80053 COMPREHEN METABOLIC PANEL: CPT

## 2017-12-20 PROCEDURE — 63600175 PHARM REV CODE 636 W HCPCS: Performed by: PHYSICIAN ASSISTANT

## 2017-12-20 PROCEDURE — 85025 COMPLETE CBC W/AUTO DIFF WBC: CPT

## 2017-12-20 PROCEDURE — 25000003 PHARM REV CODE 250: Performed by: NURSE PRACTITIONER

## 2017-12-20 PROCEDURE — 87040 BLOOD CULTURE FOR BACTERIA: CPT

## 2017-12-20 PROCEDURE — 80158 DRUG ASSAY CYCLOSPORINE: CPT

## 2017-12-20 PROCEDURE — 84100 ASSAY OF PHOSPHORUS: CPT

## 2017-12-20 RX ORDER — OXYCODONE HYDROCHLORIDE 10 MG/1
5-10 TABLET ORAL EVERY 4 HOURS PRN
Qty: 40 TABLET | Refills: 0 | Status: SHIPPED | OUTPATIENT
Start: 2017-12-20 | End: 2017-12-29 | Stop reason: SDUPTHER

## 2017-12-20 RX ORDER — MIRTAZAPINE 7.5 MG/1
7.5 TABLET, FILM COATED ORAL NIGHTLY
Qty: 30 TABLET | Refills: 11 | Status: ON HOLD | OUTPATIENT
Start: 2017-12-20 | End: 2018-04-09 | Stop reason: HOSPADM

## 2017-12-20 RX ORDER — CYCLOSPORINE 100 MG/1
100 CAPSULE, LIQUID FILLED ORAL 2 TIMES DAILY
Qty: 60 CAPSULE | Refills: 11 | Status: SHIPPED | OUTPATIENT
Start: 2017-12-20 | End: 2018-01-02 | Stop reason: DRUGHIGH

## 2017-12-20 RX ORDER — URSODIOL 300 MG/1
300 CAPSULE ORAL 2 TIMES DAILY
Qty: 60 CAPSULE | Refills: 11 | Status: ON HOLD | OUTPATIENT
Start: 2017-12-20 | End: 2018-04-09 | Stop reason: HOSPADM

## 2017-12-20 RX ORDER — METRONIDAZOLE 500 MG/1
500 TABLET ORAL EVERY 8 HOURS
Qty: 18 TABLET | Refills: 0 | Status: SHIPPED | OUTPATIENT
Start: 2017-12-20 | End: 2017-12-26

## 2017-12-20 RX ORDER — CYCLOSPORINE 25 MG/1
25 CAPSULE, LIQUID FILLED ORAL 2 TIMES DAILY
Qty: 60 CAPSULE | Refills: 11 | Status: SHIPPED | OUTPATIENT
Start: 2017-12-20 | End: 2018-01-02 | Stop reason: DRUGHIGH

## 2017-12-20 RX ORDER — OXYCODONE HYDROCHLORIDE 5 MG/1
5 TABLET ORAL EVERY 4 HOURS PRN
Status: DISCONTINUED | OUTPATIENT
Start: 2017-12-20 | End: 2017-12-20 | Stop reason: HOSPADM

## 2017-12-20 RX ORDER — LEVOTHYROXINE SODIUM 75 UG/1
75 TABLET ORAL
Qty: 30 TABLET | Refills: 11 | Status: SHIPPED | OUTPATIENT
Start: 2017-12-21 | End: 2019-01-07

## 2017-12-20 RX ORDER — OXYCODONE HYDROCHLORIDE 5 MG/1
10 TABLET ORAL EVERY 4 HOURS PRN
Status: DISCONTINUED | OUTPATIENT
Start: 2017-12-20 | End: 2017-12-20 | Stop reason: HOSPADM

## 2017-12-20 RX ORDER — CIPROFLOXACIN 500 MG/1
500 TABLET ORAL DAILY
Qty: 6 TABLET | Refills: 0 | Status: SHIPPED | OUTPATIENT
Start: 2017-12-20 | End: 2017-12-26

## 2017-12-20 RX ADMIN — DOCUSATE SODIUM 100 MG: 100 CAPSULE, LIQUID FILLED ORAL at 09:12

## 2017-12-20 RX ADMIN — ISAVUCONAZONIUM SULFATE 372 MG: 186 CAPSULE ORAL at 09:12

## 2017-12-20 RX ADMIN — PANTOPRAZOLE SODIUM 40 MG: 40 TABLET, DELAYED RELEASE ORAL at 09:12

## 2017-12-20 RX ADMIN — LEVOTHYROXINE SODIUM 75 MCG: 50 TABLET ORAL at 05:12

## 2017-12-20 RX ADMIN — METRONIDAZOLE 500 MG: 500 TABLET ORAL at 05:12

## 2017-12-20 RX ADMIN — URSODIOL 300 MG: 300 CAPSULE ORAL at 09:12

## 2017-12-20 RX ADMIN — OXYCODONE HYDROCHLORIDE 10 MG: 5 TABLET ORAL at 05:12

## 2017-12-20 RX ADMIN — HEPARIN SODIUM 5000 UNITS: 5000 INJECTION, SOLUTION INTRAVENOUS; SUBCUTANEOUS at 05:12

## 2017-12-20 RX ADMIN — OXYCODONE HYDROCHLORIDE 10 MG: 5 TABLET ORAL at 02:12

## 2017-12-20 RX ADMIN — OXYCODONE HYDROCHLORIDE 10 MG: 5 TABLET ORAL at 09:12

## 2017-12-20 RX ADMIN — ATOVAQUONE 1500 MG: 750 SUSPENSION ORAL at 09:12

## 2017-12-20 RX ADMIN — LEVETIRACETAM 500 MG: 500 TABLET, FILM COATED ORAL at 09:12

## 2017-12-20 RX ADMIN — CIPROFLOXACIN HYDROCHLORIDE 500 MG: 500 TABLET, FILM COATED ORAL at 09:12

## 2017-12-20 RX ADMIN — CYCLOSPORINE 150 MG: 100 CAPSULE, LIQUID FILLED ORAL at 09:12

## 2017-12-20 RX ADMIN — MUPIROCIN: 20 OINTMENT TOPICAL at 09:12

## 2017-12-20 NOTE — NURSING
Pt left unit via home wheelchair with mom. All discharge paperwork given and reviewed. Pt verbalized understanding.

## 2017-12-20 NOTE — PROGRESS NOTES
Discharge Note: Patient tentatively scheduled to be discharged today to [address] locally with mom Sanjuanita Diana ph# [phone].  The patient appeared to be alert, oriented and communicative. The patient was sitting upright in bed during assessment.  The mother of the patient appeared alert, oriented and communicative.  The mother of the patient was seated on the couch bedside. Pt confirmed identifiers. SW discussed discharge plans with the patient and caregiver. Both reported adequate coping.  Pt is set up for outpatient HD at [facility] MWF 3pm, and is expected to present today post discharge.  EZEKIEL discussed holiday schedule with the patient who reports understanding.  Please see EZEKIEL previous note for schedule.     Pt has his home wound vac from [facility] in hospital room and will resume home health with [facility] for SN wound care and PT. EZEKIEL notified [employer] ext [phone] of pts discharge.  There were no further questions or concerns at this time. Patient verbalized understanding and agreement with the information reviewed, social work availability, and how to access available resources if needed. EZEKIEL remains available.

## 2017-12-20 NOTE — PLAN OF CARE
Problem: Patient Care Overview  Goal: Plan of Care Review  Outcome: Ongoing (interventions implemented as appropriate)  Pt aao x 4. VSS. Low grade temps continued, tmax 100. 1.7 L removed in HD. Prn pain meds given Q 3. Wound vac to chevron site, dressing changed by wound care. PO abx continued as ordered. Mother at bedside. Pt ambulates independently. Poor appetite, pt passes flatus, bowel sounds active.

## 2017-12-20 NOTE — PROGRESS NOTES
Discharge Medication Note:    Hospital Course:  Mr. Diana is a 26 y/o male with past medical history of alcoholic cirrhosis.  S/p DDLT 10/19/2017; c/b seizures (swtiched from prograf to cyclo), ATN requiring HD (last HD 11/17), superficial wound infection s/p wound vac to chevron incision, and recent admit for fevers on 11/21 (discharged on empiric augmentin for suspected superficial wound infection) and readmitted 11/24 again with fever. Found to have peritonitis (WBC 5000, 75% PNM) neg for bile leak. He was treated initially with vanc/cefepime. Repeat cell counts 11/29 with some improvement (WBC 1400, 45% PNM). Pt transitioned to augmentin upon d/c 11/30. Re-admit with subjective fever (100.5) and general malaise, abdominal pain, and leukocytosis, despite being on augmentin for recent diagnosis of peritonitis. Infectious work up and broad spectrum abx (vanc/cef) initiated. Para on admit 12/5/17- with 3L removed ( down from 1500). ERCP was then performed on 12/6/17 as pt with hyperbilirubinemia and stricture seen on imaging. ERCP with stent placed, and bilirubin then with improvement. Ursodiol started afterwards as well and patient will be discharged on 300mg twice daily.     Pt remains with abdominal pain but controlled with current regimen. PO intake remained poor afterwards. He did not tolerated tube feeds so TPN started then weaned off on 12/15 once he was eating better. Remeron initiated as appetite stimulant.      He had been started on zosyn and vanc then transitioned to oral antibiotics (cipro and flagyl on 12/15). He will be discharged on PO cipro and flagyl (end date 12/25)    He continued to complain of abdominal fullness and a liver u/s was obtained (12/16) showing a fluid collection. Pt was then sent to IR for drain placement 12/18, fluid negative for infection. A KUB obtained 12/17 showed a mild ileus.  Suppository was given with multiple bowel movements afterwards. Pt to continue bowel regimen  of Colace and miralax.     Of note, pt had slowly recovering renal function prior to admit but HD restarted upon this admission. Will remain on HD MWF as outpatient     On this visit, labs found high TSH with low free T4. Endocrine consulted and synthroid 75mcg initiated for patient. Will need follow up TSH/T4 next month.     On discharge: Immunosuppressant: cyclosporine 125mg BID  Cellcept on HOLD for leukopenia    Opportunistic infection prophylaxis: atovaquone, isavuconazole (for mold prophylaxis)  valcyte on HOLD for leukopenia - he will be getting weekly CMV PCRs. Last CMV PCR on 12/14 was undetectable. Next one due on 12/20, which was drawn prior to discharge.      Met with Jhonny Diana at discharge to review discharge medications and to update the blue medication card.       Jhonny Diana   Home Medication Instructions NISHI:[id number]    Printed on:12/20/17 4900   Medication Information                      aspirin (ECOTRIN) 81 MG EC tablet  Take 1 tablet (81 mg total) by mouth once daily.             atovaquone (MEPRON) 750 mg/5 mL Susp  Take 10 mLs (1,500 mg total) by mouth once daily. Stop on 4/17/18             ciprofloxacin HCl (CIPRO) 500 MG tablet  Take 1 tablet (500 mg total) by mouth once daily.             cycloSPORINE modified, NEORAL, (NEORAL) 100 MG capsule  Take 1 capsule (100 mg total) by mouth 2 (two) times daily. Take with 25mg 2 times daily (for total of 125mg twice daily)             cycloSPORINE modified, NEORAL, 25 MG capsule  Take 1 capsule (25 mg total) by mouth 2 (two) times daily. Take with 100mg capsule twice daily (for total of 125mg twice daily)             docusate sodium (COLACE) 100 MG capsule  Take 100 mg by mouth 3 (three) times daily as needed for Constipation.             ergocalciferol (ERGOCALCIFEROL) 50,000 unit Cap  Take 1 capsule (50,000 Units total) by mouth every 7 days.             isavuconazonium sulfate 186 mg Cap  Take 2 capsules (372 mg total) by mouth once  daily.             levETIRAcetam (KEPPRA) 500 MG Tab  Take 1 tablet (500 mg total) by mouth 2 (two) times daily.             levothyroxine (SYNTHROID) 75 MCG tablet  Take 1 tablet (75 mcg total) by mouth before breakfast.             metroNIDAZOLE (FLAGYL) 500 MG tablet  Take 1 tablet (500 mg total) by mouth every 8 (eight) hours.             mirtazapine (REMERON) 7.5 MG Tab  Take 1 tablet (7.5 mg total) by mouth every evening.             multivitamin (THERAGRAN) tablet  Take 1 tablet by mouth once daily.             ondansetron (ZOFRAN-ODT) 8 MG TbDL  Take 1 tablet (8 mg total) by mouth every 8 (eight) hours as needed (nausea).             oxyCODONE (ROXICODONE) 10 mg Tab immediate release tablet  Take 0.5-1 tablets (5-10 mg total) by mouth every 4 (four) hours as needed for Pain.             pantoprazole (PROTONIX) 40 MG tablet  Take 1 tablet (40 mg total) by mouth once daily.             sodium bicarbonate 650 MG tablet  Take 2 tablets (1,300 mg total) by mouth 2 (two) times daily.             thiamine 100 MG tablet  Take 1 tablet (100 mg total) by mouth once daily.             ursodiol (ACTIGALL) 300 mg capsule  Take 1 capsule (300 mg total) by mouth 2 (two) times daily.                 Pt was provided with prescriptions for the following meds:  E-rx: cyclosporine, synthroid, ursodiol, flagyl, cipro  Printed rx: oxycodone 10mg  Phone-in or faxed rx: n/a to n/a pharmacy per pt request.    The following medications have been placed on HOLD and should be restarted in the outpatient setting (when appropriate): cellceptnoe verbalized understanding and had the opportunity to ask questions.

## 2017-12-20 NOTE — PROGRESS NOTES
Dialysis completed. Right IJ tunneled catheter flushed with normal saline, heparinized,capped and taped. Patient dialyzed for 3.5 hours with fluid removal of 1.7 liters.Tolerated well with stable vital signs. Report called to floor to SISSY Ty.

## 2017-12-20 NOTE — PLAN OF CARE
Problem: Patient Care Overview  Goal: Plan of Care Review  Outcome: Ongoing (interventions implemented as appropriate)  Pt remained free from injury during shift. VSS. Pt c/o abdominal pain with moderate relief from 10mg Oxycodone. Pain regimen changed to q4hr PRN oxycodone. Blood cultures drawn in am. Appetite minimal but encouraging nutrition. Wound vac remains intact. Transitioned to home wound vac prior to discharge. Ok from medical standpoint for d/c. All discharge paperwork given. Pt and mother verbalized understanding with discharge plan, medications, and follow up appointments.

## 2017-12-20 NOTE — PHYSICIAN QUERY
PT Name: Jhonny Diana  MR #: [id number]     Physician Query Form - Diagnosis Clarification      CDS/: Judie Sumner                 Contact information:[email or web address]    This form is a permanent document in the medical record.     Query Date: December 20, 2017    By submitting this query, we are merely seeking further clarification of documentation.  Please utilize your independent clinical judgment when addressing the question(s) below.     The medical record contains the following:      Findings Supporting Clinical Information Location in Medical Record   ileus Blood counts responded appropriately to transfusion.  Possible IR drainage in am for fluid collection noted on ultrasound.  Obtain KUB to assess for ileus.  Consider starting appetite  stimulant.     Obtain KUB to assess for ileus- give suppository.  Consider starting appetite stimulant.     One view: There is a biliary stent. There is mild ileus. No perforation seen. There is a left-sided drain.      Transplant liver note 12-17            Transplant liver note 12-19        Abdominal xray 1 view 12-17     Please clarify if the ileus diagnosis has been:    [  ] Ruled In  [  ] Ruled In, Now Resolved  [  ] Ruled Out  [ X  ] Clinically undetermined  [  ] Other/Clarification of findings (please specify)_______________________________    Please document in your progress notes daily for the duration of treatment, until resolved, and include in your discharge summary.

## 2017-12-21 ENCOUNTER — OFFICE VISIT (OUTPATIENT)
Dept: TRANSPLANT | Facility: CLINIC | Age: 27
End: 2017-12-21
Payer: COMMERCIAL

## 2017-12-21 VITALS
SYSTOLIC BLOOD PRESSURE: 108 MMHG | HEART RATE: 123 BPM | WEIGHT: 158.75 LBS | TEMPERATURE: 100 F | OXYGEN SATURATION: 96 % | DIASTOLIC BLOOD PRESSURE: 60 MMHG | HEIGHT: 66 IN | RESPIRATION RATE: 16 BRPM | BODY MASS INDEX: 25.51 KG/M2

## 2017-12-21 DIAGNOSIS — R53.81 PHYSICAL DECONDITIONING: ICD-10-CM

## 2017-12-21 DIAGNOSIS — K83.1 BILIARY STRICTURE OF TRANSPLANTED LIVER: ICD-10-CM

## 2017-12-21 DIAGNOSIS — T86.49 BILIARY STRICTURE OF TRANSPLANTED LIVER: ICD-10-CM

## 2017-12-21 DIAGNOSIS — Z29.89 PROPHYLACTIC IMMUNOTHERAPY: Chronic | ICD-10-CM

## 2017-12-21 DIAGNOSIS — T81.89XD DELAYED SURGICAL WOUND HEALING, SUBSEQUENT ENCOUNTER: ICD-10-CM

## 2017-12-21 DIAGNOSIS — E44.0 MODERATE PROTEIN-CALORIE MALNUTRITION: ICD-10-CM

## 2017-12-21 DIAGNOSIS — K65.9 PERITONITIS: Primary | ICD-10-CM

## 2017-12-21 DIAGNOSIS — Z94.4 LIVER TRANSPLANTED: Chronic | ICD-10-CM

## 2017-12-21 DIAGNOSIS — Z79.60 LONG-TERM USE OF IMMUNOSUPPRESSANT MEDICATION: Chronic | ICD-10-CM

## 2017-12-21 PROBLEM — R50.9 FEVER: Status: RESOLVED | Noted: 2017-11-17 | Resolved: 2017-12-21

## 2017-12-21 LAB
BACTERIA SPEC AEROBE CULT: NO GROWTH
CMV DNA SERPL NAA+PROBE-ACNC: NORMAL IU/ML

## 2017-12-21 PROCEDURE — 99205 OFFICE O/P NEW HI 60 MIN: CPT | Mod: S$GLB,,, | Performed by: INTERNAL MEDICINE

## 2017-12-21 PROCEDURE — 99999 PR PBB SHADOW E&M-EST. PATIENT-LVL III: CPT | Mod: PBBFAC,,, | Performed by: INTERNAL MEDICINE

## 2017-12-21 NOTE — Clinical Note
No change in medication. Patient to start on home PT - which was apparently arranged by inpatient team. He needs to start going to [facility] soon. Labs weekly, return in 2 weeks (I will be on vacation, to see someone in [place]). Thank you.

## 2017-12-21 NOTE — PROGRESS NOTES
Transplant HEPATOLOGY  Liver Transplant Recipient Follow-up    Original Referring Physician: ZANE Nieto  Current Corresponding Physician: Georgina Rogers MD    Chief Complaint: Jhonny is here for follow up of his liver transplant performed 10/19/2017 for the primary diagnosis (UNOS) of Acute Alcoholic Hepatitis    ORGAN: LIVER  Whole or Partial: whole liver  Donor Type:  - brain death  PHS Increased Risk: no  Donor CMV Status: Positive  Donor HCV Status: Negative  Donor HBcAb: Negative  Biliary Anastomosis: end to end  Arterial Anatomy: standard  IVC reconstruction: end to end ivc  Portal vein status: patent      Liver, explant, hepatectomy:  - Cirrhosis, stage 4 (of 4).  Etiology: Alcohol  - Acute cholestatic hepatitis with background chronic changes.  - Steatosis with steatohepatitis with Yanira's hyaline.  - Negative for malignancy on sections examined.  - Gallbladder with no significant histologic abnormality.  - See comment.  COMMENT: The liver explant (specimen 2) shows acute cholestatic hepatitis in a background of chronic changes  secondary to steatohepatitis. There is cirrhosis seen on trichrome stain. There is trace iron within macrophages on  iron stain. Appropriate positive and negative controls are examined.      Immunosuppressions  Tacrolimus:                                          No, stopped due to seizure  MMF:                                                    No, held due to leukopenia  Prednisone:                                         No, completed  Cyclosporine:                                       yes  Sirolimus:                                             no   Everolimus:                                          no    Graft Function:                       excellent      Post-LT Complications  Acute cellular rejection:                       no  Antibody-mediated rejection:               no  Biliary anastomotic stricture:          yes  HAT:                                                     no  HA stenosis:                                        no  PV stenosis:                                        no  CMV reactivation:                                no  PTLD:                                                   no  Other malignancies:                             no    ERCP (12/3/17): unable to cannulate  ERCP (12/6/17): biliary sphincterotomy, bile duct anastomotic stricture, 10Fr x 9 cm plastic stent placed across the stricture, next due in 3/6/17      Liver Biopsy:  None since transplant    VCTE (fibroscan): n/a    Post-LT Recommended Screening Tests  Dermatology check up:                           no  Colonoscopy:                                          no  Bone Mineral Density (BMD):                 no  HbA1C:                                                    yes    Lab Results   Component Value Date    HGBA1C <4.0 (A) 10/18/2017       Seasonal flu vaccination:      Subjective:     History of Present Illness: He has had the following complications since transplant: renal insufficiency, wound infection and renal insufficiency, biliary anastomotic stricture (s/p ERCP stent) and recently ascites/peritonitis..  The noted complications need to be addressed more thoroughly today.    Interval History:   12/21/17: Patient was discharged from hospital yesterday 12/20/17. He was admitted with abd pain, fever, ascites - diagnosed with peritonitis.    Currently, he is doing adequately.  Current complaints include abdominal pain and edema.  Jhonny is here for management of his immunosuppression medication.    Post-Op Course:  Mr. Diana is a 26 y/o male with past medical history of alcoholic cirrhosis.  S/p DDLT 10/19/2017; c/b seizures (swtiched from prograf to cyclo), ATN requiring HD (last HD 11/17), superficial wound infection s/p wound vac to chevron incision, and recent admit for fevers on 11/21 (discharged on empiric augmentin for suspected superficial wound infection) and readmitted 11/24  again with fever. Found to have peritonitis (WBC 5000, 75% PNM) neg for bile leak. He was treated initially with vanc/cefepime. Repeat cell counts 11/29 with some improvement (WBC 1400, 45% PNM). Pt transitioned to augmentin upon d/c 11/30. Re-admit with subjective fever (100.5) and general malaise, abdominal pain, and leukocytosis, despite being on augmentin for recent diagnosis of peritonitis. Infectious work up and broad spectrum abx (vanc/cef) initiated. Para on admit 12/5/17- with 3L removed ( down from 1500). ERCP was then performed on 12/6/17 as pt with hyperbilirubinemia and stricture seen on imaging. ERCP with stent placed, and bilirubin then with improvement. Ursodiol started afterwards as well and patient was discharged on 300mg twice daily.     Last admission course for peritonitis.  Peritonitis     - Diagnosed on previous admission.  Last para 11/29 with improving cell counts WBC 1400 with 45% PNMs   Was d/c'ed on augmentin, switched to vanc/cef at admit  - Repeat paracentesis 12/5/17, with  (improving from 1497)  - CT A/P repeated 12/9 with signif ascites  - Abx transitioned to Zosyn/Vanc and Isavu added 12/9 with signif clinical improvement  - Para 12/11 with 4.5 L removed - cell ct signif improved and negative for infection  - IR drain placed 1213 to drain remainder of ascites --> 2.5 L removed and replaced albumin 25% 100 cc x 2.  Cell count negative for infection  - discontinue zosyn/vanc/isavu, transitioned to oral cipro/flagyl 12/15/17  -repeat abd/liver u/s 12/16 with undrained fluid collection,  - IR drained 250cc - labs negative for infection, drains removed 12/18  - continue oral antibiotics x 7 more days         Review of Systems   Constitutional: Negative for activity change and appetite change.   HENT: Negative for congestion and tinnitus.    Eyes: Negative for redness and visual disturbance.   Respiratory: Negative for cough and shortness of breath.    Cardiovascular:  Negative for chest pain and palpitations.   Gastrointestinal: Negative for abdominal distention and abdominal pain.   Endocrine: Negative for polydipsia and polyuria.   Genitourinary: Negative for decreased urine volume and dysuria.   Musculoskeletal: Negative for arthralgias and back pain.   Skin: Negative for pallor and rash.   Allergic/Immunologic: Positive for immunocompromised state. Negative for environmental allergies.   Neurological: Negative for tremors and headaches.   Hematological: Negative for adenopathy. Does not bruise/bleed easily.   Psychiatric/Behavioral: Negative for behavioral problems and confusion.       Objective:     Physical Exam   Constitutional: He is oriented to person, place, and time. He appears well-developed and well-nourished. No distress.   HENT:   Head: Normocephalic.   Eyes: No scleral icterus.   Neck: Normal range of motion. Neck supple. No JVD present.   Cardiovascular: Normal rate, regular rhythm and intact distal pulses.    Pulmonary/Chest: Effort normal. No respiratory distress.   Abdominal: Soft. He exhibits no mass. There is no rebound and no guarding.       Musculoskeletal: Normal range of motion.   Neurological: He is alert and oriented to person, place, and time.   Skin: Skin is warm and dry. He is not diaphoretic.   Psychiatric: He has a normal mood and affect. His behavior is normal. Judgment and thought content normal.     Lab Results   Component Value Date    BILITOT 1.9 (H) 12/20/2017    AST 19 12/20/2017    ALT 5 (L) 12/20/2017    ALKPHOS 98 12/20/2017    CREATININE 3.4 (H) 12/20/2017    ALBUMIN 2.2 (L) 12/20/2017     Lab Results   Component Value Date    WBC 3.09 (L) 12/20/2017    HGB 8.3 (L) 12/20/2017    HCT 24.7 (L) 12/20/2017    HCT 28 (L) 10/20/2017     12/20/2017     Lab Results   Component Value Date    TACROLIMUS <1.5 (L) 11/25/2017    CYCLOSPORINE 399 12/20/2017       Assessment/Plan:          · S/P liver transplant.  · Chronic immunosuppressive  medications for rejection prophylaxis at target.  Plan: no adjustment needed.  · Continue monitoring symptoms, labs and drug levels for drug-related toxicity and side effects.  · Incision: wound open: fully wound vac  · Renal Insufficiency: Follow up with Nephrology, continue HD as needed, if no improvement in next 6 months, consider placing on kidney transplant list (within 1 year from [facility] - [facility])  · Pain medication:Consider to see Pain Management Specialist  · Nutrition: continue high protein diet, protein supplements, continue appetite stimulant  · Biliary anastomotic stricture: s/p ERCP with plastic stent on 12/6/17 - next ERCP due on 3/6/2018      UNOS Patient Status  Functional Status: 50% - Requires considerable assistance and frequent medical care  Physical Capacity: Limited Mobility     A total of 60 minutes were spent face-to-face with the patient during this encounter and over half of that time was spent on counseling and coordination of care.  We discussed in depth the nature of the patient's immunocompromised status, liver graft function, immunosuppression, preventative measures and the management plan in details. I also educated the patient about lifestyle modifications which may improve hepatic steatosis, overweight/obesity, insulin resistance and high blood pressure issues. I have provided the patient with an opportunity to ask questions and have all questions answered to his satisfaction.     Georgina Rogers MD  Staff Physician  Hepatology and Liver Transplant  [facility]  [facility]

## 2017-12-21 NOTE — LETTER
December 21, 2017                     [facility]  [address]  [facility] [address]  Phone: [phone]   Patient: Jhonny Diana   MR Number: [id number]   YOB: 1990   Date of Visit: 12/21/2017       Dear      Thank you for referring Jhonny Diana to me for evaluation. Attached you will find relevant portions of my assessment and plan of care.    If you have questions, please do not hesitate to call me. I look forward to following Jhonny Diana along with you.    Sincerely,    Georgina Rogers MD    Enclosure    If you would like to receive this communication electronically, please contact [email or web address] or [phone] to request [employer] access.    [employer] is a tool which provides read-only access to select patient information with whom you have a relationship. Its easy to use and provides real time access to review your patients record including encounter summaries, notes, results, and demographic information.    If you feel you have received this communication in error or would no longer like to receive these types of communications, please e-mail [email or web address]

## 2017-12-25 LAB
BACTERIA BLD CULT: NORMAL
BACTERIA BLD CULT: NORMAL

## 2017-12-26 LAB — BACTERIA SPEC ANAEROBE CULT: NORMAL

## 2017-12-27 ENCOUNTER — LAB VISIT (OUTPATIENT)
Dept: LAB | Facility: HOSPITAL | Age: 27
End: 2017-12-27
Attending: SURGERY
Payer: COMMERCIAL

## 2017-12-27 DIAGNOSIS — Z94.4 LIVER REPLACED BY TRANSPLANT: ICD-10-CM

## 2017-12-27 DIAGNOSIS — Z94.4 LIVER REPLACED BY TRANSPLANT: Primary | ICD-10-CM

## 2017-12-27 LAB
ALBUMIN SERPL BCP-MCNC: 2.3 G/DL
ALP SERPL-CCNC: 106 U/L
ALT SERPL W/O P-5'-P-CCNC: 6 U/L
ANION GAP SERPL CALC-SCNC: 11 MMOL/L
AST SERPL-CCNC: 29 U/L
BASOPHILS # BLD AUTO: 0.06 K/UL
BASOPHILS NFR BLD: 1.4 %
BILIRUB DIRECT SERPL-MCNC: 1.8 MG/DL
BILIRUB SERPL-MCNC: 2.2 MG/DL
BUN SERPL-MCNC: 5 MG/DL
CALCIUM SERPL-MCNC: 8.9 MG/DL
CHLORIDE SERPL-SCNC: 102 MMOL/L
CO2 SERPL-SCNC: 24 MMOL/L
CREAT SERPL-MCNC: 5 MG/DL
CYCLOSPORINE BLD LC/MS/MS-MCNC: 281 NG/ML
DIFFERENTIAL METHOD: ABNORMAL
EOSINOPHIL # BLD AUTO: 0 K/UL
EOSINOPHIL NFR BLD: 0.2 %
ERYTHROCYTE [DISTWIDTH] IN BLOOD BY AUTOMATED COUNT: 18.4 %
EST. GFR  (AFRICAN AMERICAN): 16.8 ML/MIN/1.73 M^2
EST. GFR  (NON AFRICAN AMERICAN): 14.6 ML/MIN/1.73 M^2
FUNGUS SPEC CULT: NORMAL
FUNGUS SPEC CULT: NORMAL
GLUCOSE SERPL-MCNC: 63 MG/DL
HCT VFR BLD AUTO: 31.9 %
HGB BLD-MCNC: 10.2 G/DL
IMM GRANULOCYTES # BLD AUTO: 0.05 K/UL
IMM GRANULOCYTES NFR BLD AUTO: 1.2 %
LYMPHOCYTES # BLD AUTO: 1.4 K/UL
LYMPHOCYTES NFR BLD: 33 %
MCH RBC QN AUTO: 30.6 PG
MCHC RBC AUTO-ENTMCNC: 32 G/DL
MCV RBC AUTO: 96 FL
MONOCYTES # BLD AUTO: 1.7 K/UL
MONOCYTES NFR BLD: 38.4 %
NEUTROPHILS # BLD AUTO: 1.1 K/UL
NEUTROPHILS NFR BLD: 25.8 %
NRBC BLD-RTO: 0 /100 WBC
PLATELET # BLD AUTO: 288 K/UL
PMV BLD AUTO: 9.9 FL
POTASSIUM SERPL-SCNC: 4.5 MMOL/L
PROT SERPL-MCNC: 5.2 G/DL
RBC # BLD AUTO: 3.33 M/UL
SODIUM SERPL-SCNC: 137 MMOL/L
WBC # BLD AUTO: 4.3 K/UL

## 2017-12-27 PROCEDURE — 80158 DRUG ASSAY CYCLOSPORINE: CPT

## 2017-12-27 PROCEDURE — 36415 COLL VENOUS BLD VENIPUNCTURE: CPT

## 2017-12-27 PROCEDURE — 85025 COMPLETE CBC W/AUTO DIFF WBC: CPT

## 2017-12-27 PROCEDURE — 82248 BILIRUBIN DIRECT: CPT

## 2017-12-27 PROCEDURE — 80053 COMPREHEN METABOLIC PANEL: CPT

## 2017-12-28 ENCOUNTER — TELEPHONE (OUTPATIENT)
Dept: TRANSPLANT | Facility: CLINIC | Age: 27
End: 2017-12-28

## 2017-12-28 NOTE — TELEPHONE ENCOUNTER
----- Message from Nathanael Medina MD sent at 12/26/2017 12:59 PM CST -----  Results ok. No action needed

## 2017-12-28 NOTE — TELEPHONE ENCOUNTER
----- Message from Carley Mesa sent at 12/28/2017 11:09 AM CST -----  Contact: Jose Hall    Pt would like to speak with you regarding his lab work, upcoming appts and status on pain medication    Pt contact number [phone]    Thanks

## 2017-12-28 NOTE — TELEPHONE ENCOUNTER
----- Message from Georgina Rogers MD sent at 12/28/2017 12:42 PM CST -----  Yes, they need to see Pain Management. If he needs now until Pain appointment, I can renew for 1 week supply. But, they have to come  the script from [facility]. I do not have RSA token to prescribe narcotic online.    Georgina  ----- Message -----  From: Isabel Gilliam RN  Sent: 12/28/2017  11:47 AM  To: Georgina Rogers MD    This pt was seen by you on 12/21/18 and is requesting pain med refill..should I send the refill this time?    Will send Pain management referral      Thanks  Isabel

## 2017-12-29 ENCOUNTER — TELEPHONE (OUTPATIENT)
Dept: TRANSPLANT | Facility: CLINIC | Age: 27
End: 2017-12-29

## 2017-12-29 ENCOUNTER — TELEPHONE (OUTPATIENT)
Dept: TRANSPLANT | Facility: HOSPITAL | Age: 27
End: 2017-12-29

## 2017-12-29 DIAGNOSIS — R10.9 CHRONIC ABDOMINAL PAIN: Primary | ICD-10-CM

## 2017-12-29 DIAGNOSIS — R10.9 ABDOMINAL PAIN, UNSPECIFIED ABDOMINAL LOCATION: Primary | ICD-10-CM

## 2017-12-29 DIAGNOSIS — Z94.4 STATUS POST LIVER TRANSPLANT: Primary | ICD-10-CM

## 2017-12-29 DIAGNOSIS — G89.29 CHRONIC ABDOMINAL PAIN: Primary | ICD-10-CM

## 2017-12-29 RX ORDER — OXYCODONE HYDROCHLORIDE 10 MG/1
5-10 TABLET ORAL EVERY 6 HOURS PRN
Qty: 30 TABLET | Refills: 0 | Status: SHIPPED | OUTPATIENT
Start: 2017-12-29 | End: 2018-01-05 | Stop reason: SDUPTHER

## 2017-12-29 NOTE — TELEPHONE ENCOUNTER
Oxycodone 10 mg Q6 - #30 tablets. Prescription printed, signed and left at [facility] reception for patient to .  Patient needs to find and schedule Pain Management.

## 2017-12-29 NOTE — TELEPHONE ENCOUNTER
----- Message from Isabel Gilliam RN sent at 12/22/2017  2:30 PM CST -----      ----- Message -----  From: Georgina Rogers MD  Sent: 12/21/2017   4:54 PM  To: [facility] Post-Liver Transplant Clinical    No change in medication. Patient to start on home PT - which was apparently arranged by inpatient team. He needs to start going to [facility] soon. Labs weekly, return in 2 weeks (I will be on vacation, to see someone in [facility]). Thank you.

## 2017-12-30 ENCOUNTER — NURSE TRIAGE (OUTPATIENT)
Dept: ADMINISTRATIVE | Facility: CLINIC | Age: 27
End: 2017-12-30

## 2017-12-30 NOTE — TELEPHONE ENCOUNTER
Reason for Disposition   [1] Systolic BP  AND [2] taking blood pressure medications AND [3] NOT dizzy, lightheaded or weak    Protocols used: ST LOW BLOOD PRESSURE-A-AH

## 2017-12-30 NOTE — TELEPHONE ENCOUNTER
"  Answer Assessment - Initial Assessment Questions  1. BLOOD PRESSURE: "What is the blood pressure?" "Did you take at least two measurements 5 minutes apart?"      98/44 repeat 105/58   2. ONSET: "When did you take your blood pressure?"      This morning   3. HOW: "How did you obtain the blood pressure?" (e.g., visiting nurse, automatic home BP monitor)      Home monitor  4. HISTORY: "Do you have a history of low blood pressure?" "What is your blood pressure normally?"      No   5. MEDICATIONS: "Are you taking any medications for blood pressure?" If yes: "Have they been changed recently?"      Synthroid and Protonix   6. PULSE RATE: "Do you know what your pulse rate is?"       91 repeated 89   7. OTHER SYMPTOMS: "Have you been sick recently?" "Have you had a recent injury?"       No      8. PREGNANCY: "Is there any chance you are pregnant?" "When was your last menstrual period?"      N/A    Protocols used: ST LOW BLOOD PRESSURE-A-    "

## 2017-12-30 NOTE — TELEPHONE ENCOUNTER
Spoke with Dr. Witt states to advise patient resume daily routine and continue monitoring B/P throughout day; if no symptoms f/u with PCP when office opens. If symptoms persist or worsen patient should seek medical care for evaluation; EC/Mother informed/understood/agreed to such.

## 2018-01-02 ENCOUNTER — TELEPHONE (OUTPATIENT)
Dept: TRANSPLANT | Facility: CLINIC | Age: 28
End: 2018-01-02

## 2018-01-02 ENCOUNTER — LAB VISIT (OUTPATIENT)
Dept: LAB | Facility: HOSPITAL | Age: 28
End: 2018-01-02
Attending: INTERNAL MEDICINE
Payer: COMMERCIAL

## 2018-01-02 DIAGNOSIS — Z94.4 LIVER REPLACED BY TRANSPLANT: ICD-10-CM

## 2018-01-02 DIAGNOSIS — Z94.4 STATUS POST LIVER TRANSPLANT: ICD-10-CM

## 2018-01-02 LAB
ALBUMIN SERPL BCP-MCNC: 2 G/DL
ALP SERPL-CCNC: 184 U/L
ALT SERPL W/O P-5'-P-CCNC: 5 U/L
ANION GAP SERPL CALC-SCNC: 8 MMOL/L
ANISOCYTOSIS BLD QL SMEAR: SLIGHT
AST SERPL-CCNC: 30 U/L
BASOPHILS # BLD AUTO: 0.04 K/UL
BASOPHILS NFR BLD: 1.3 %
BILIRUB DIRECT SERPL-MCNC: 2.3 MG/DL
BILIRUB SERPL-MCNC: 3 MG/DL
BUN SERPL-MCNC: 9 MG/DL
CALCIUM SERPL-MCNC: 8.6 MG/DL
CHLORIDE SERPL-SCNC: 101 MMOL/L
CO2 SERPL-SCNC: 28 MMOL/L
CREAT SERPL-MCNC: 3.6 MG/DL
CYCLOSPORINE BLD LC/MS/MS-MCNC: 409 NG/ML
DIFFERENTIAL METHOD: ABNORMAL
EOSINOPHIL # BLD AUTO: 0 K/UL
EOSINOPHIL NFR BLD: 0 %
ERYTHROCYTE [DISTWIDTH] IN BLOOD BY AUTOMATED COUNT: 17.6 %
EST. GFR  (AFRICAN AMERICAN): 25.1 ML/MIN/1.73 M^2
EST. GFR  (NON AFRICAN AMERICAN): 21.7 ML/MIN/1.73 M^2
GLUCOSE SERPL-MCNC: 71 MG/DL
HCT VFR BLD AUTO: 28.8 %
HGB BLD-MCNC: 9.4 G/DL
IMM GRANULOCYTES # BLD AUTO: 0.03 K/UL
IMM GRANULOCYTES NFR BLD AUTO: 1 %
INR PPP: 1.1
LYMPHOCYTES # BLD AUTO: 1.2 K/UL
LYMPHOCYTES NFR BLD: 37.7 %
MCH RBC QN AUTO: 31.1 PG
MCHC RBC AUTO-ENTMCNC: 32.6 G/DL
MCV RBC AUTO: 95 FL
MONOCYTES # BLD AUTO: 0.9 K/UL
MONOCYTES NFR BLD: 30.2 %
NEUTROPHILS # BLD AUTO: 0.9 K/UL
NEUTROPHILS NFR BLD: 29.8 %
NRBC BLD-RTO: 0 /100 WBC
PLATELET # BLD AUTO: 238 K/UL
PLATELET BLD QL SMEAR: ABNORMAL
PMV BLD AUTO: 9.9 FL
POLYCHROMASIA BLD QL SMEAR: ABNORMAL
POTASSIUM SERPL-SCNC: 4.3 MMOL/L
PROT SERPL-MCNC: 4.8 G/DL
PROTHROMBIN TIME: 12 SEC
RBC # BLD AUTO: 3.02 M/UL
SODIUM SERPL-SCNC: 137 MMOL/L
WBC # BLD AUTO: 3.08 K/UL

## 2018-01-02 PROCEDURE — 85610 PROTHROMBIN TIME: CPT

## 2018-01-02 PROCEDURE — 80158 DRUG ASSAY CYCLOSPORINE: CPT

## 2018-01-02 PROCEDURE — 36415 COLL VENOUS BLD VENIPUNCTURE: CPT

## 2018-01-02 PROCEDURE — 80053 COMPREHEN METABOLIC PANEL: CPT

## 2018-01-02 PROCEDURE — 82248 BILIRUBIN DIRECT: CPT

## 2018-01-02 PROCEDURE — 85025 COMPLETE CBC W/AUTO DIFF WBC: CPT

## 2018-01-02 RX ORDER — CYCLOSPORINE 100 MG/1
100 CAPSULE, LIQUID FILLED ORAL 2 TIMES DAILY
Qty: 60 CAPSULE | Refills: 11
Start: 2018-01-02 | End: 2018-01-09 | Stop reason: SDUPTHER

## 2018-01-02 NOTE — TELEPHONE ENCOUNTER
Faxed over to [facility] admission notes from 12/3/17  To [phone]  Att Tita    Pt has been notified

## 2018-01-02 NOTE — TELEPHONE ENCOUNTER
Critical lab value received from Deyanira in the lab - pt's cyclosporine level was 409.     Unsure if level is true trough. Coordinator, Isabel SANCHEZ, to follow-up with pt to see if medication was taken prior to lab draw.

## 2018-01-02 NOTE — TELEPHONE ENCOUNTER
Pt CYa level 409. Pt reports not taking meds prior to lab draw this am. Reviewed labs with Dr. Witt will decrease CYA to 100 mg bid from 125 mg bid  Pt mother notified and agreed with plan. Will repeat labs on friday

## 2018-01-02 NOTE — TELEPHONE ENCOUNTER
----- Message from Jacquelyn Riley sent at 1/2/2018 11:12 AM CST -----  Contact: Elvira/spouse  Calling to speak with Isabel about a request from [facility] on hospital admission for the 8th please call her asap @ # [phone]

## 2018-01-03 ENCOUNTER — TELEPHONE (OUTPATIENT)
Dept: TRANSPLANT | Facility: HOSPITAL | Age: 28
End: 2018-01-03

## 2018-01-03 NOTE — TELEPHONE ENCOUNTER
EZEKIEL notified by Cassy Pollock from [facility] that pt's mother expressed multiple concerns regarding pt's physical weakness and inability to tolerate the program at this time. SW followed up with [facility] and pt's Post-Liver RN coordinator Isabel Gilliam. Both parties confirmed that pt is having a lot of medical issues right now that could make things more difficult if he were to enroll this week. Isabel requested that enrollment wait until pt is seen by Dr. Carter in the hepatology clinic. In addition, pt cannot complete IOP if he is receiving home bound services for wound care. Radha Reid from [facility] stated that pt self-discontinued home PT. SW stated that she would speak with pt about the importance of resuming and Isabel also reiterated that pt needs PT right now. SW will attempt to follow up with pt and pt's mother regarding the plan. Cassy Pollock notified that pt will hold off for the next 1-2 weeks until pt more medically stable. SW remains available.    EZEKIEL contacted pt ([phone]) regarding new plan for IOP. Pt was AAOx4 and was receiving dialysis. Pt reports that he has been struggling with swelling in his body which has been making it hard for him to move around a whole lot. Pt also sounding fatigued on the phone. Pt notified by EZEKIEL that it is important he completes the IOP before he returns home but that enrollment can be postponed for the next few weeks until pt's medical issues stabilize. Pt expressed agreement and understanding. Pt reports that he is still motivated to attend the program once more stable. SW inquired with pt why he self-discontinued PT. Pt reported that he was in and out of the hospital a lot and felt that the exercises that they were doing with pt weren't really that helpful. SW advised pt to continue with PT as this would be important in building up strength. Pt expressed agreement with this and will be in touch with the [facility] agency to resume this care. Pt currently getting  wound care at home. Pt had no further questions or concerns at this time. SW remains available.    SW attempted to contact pt's mother at the [facility] ([phone]) but was unable to reach her and she and pt are sharing a phone. Pt sent an email to pt's mother at [email or web address] with the information regarding postponement of [facility] enrollment and resumption of PT. SW asked that pt's mother respond via phone or e-mail if she had any follow up questions or concerns. SW remains available.

## 2018-01-04 ENCOUNTER — TELEPHONE (OUTPATIENT)
Dept: TRANSPLANT | Facility: CLINIC | Age: 28
End: 2018-01-04

## 2018-01-04 DIAGNOSIS — Z94.4 STATUS POST LIVER TRANSPLANT: Primary | ICD-10-CM

## 2018-01-04 NOTE — TELEPHONE ENCOUNTER
----- Message from Georgina Rogers MD sent at 1/3/2018  6:09 PM CST -----  CYA: 409 noted, Please call patient and ask if the blood draw was after taking CYA. If no, decrease CYA to 75 mg BID. Please repeat labs again tomorrow. Alkaline phosphatase and bilirubin elevated, please obtain [facility] post-transplant protocol and MRCP. If there is any urgent matters, contact available Hepatologists right away as I am out of country and in different time zone and not

## 2018-01-04 NOTE — TELEPHONE ENCOUNTER
----- Message from Carley Mesa sent at 1/4/2018  2:08 PM CST -----  Contact: Elvira (mother)  Onelia Hall at [facility] states the paperwork that was sent on 01/02 wasn't received would like to know if it can be emailed to [email or web address] in subject line write Jhonny Diana 1990, also Elvira would like to be CC'ed in that email her address is  [email or web address]      Contact number [phone]  Thanks

## 2018-01-04 NOTE — TELEPHONE ENCOUNTER
----- Message from Georgina Rogers MD sent at 1/4/2018  4:55 PM CST -----  Great, thanks    ----- Message -----  From: Isabel Gilliam RN  Sent: 1/4/2018  11:34 AM  To: Georgina Rogers MD    I have already reviewed this pt with Dr. Witt and Dr. Carter We Decreased to 100 mg bid and will repeat labs tomorrow. Dr. Carter will follow up with him on 1/9/18    Thanks  ----- Message -----  From: Georgina Rogers MD  Sent: 1/3/2018   6:09 PM  To: Isabel Gilliam RN, #    CYA: 409 noted, Please call patient and ask if the blood draw was after taking CYA. If no, decrease CYA to 75 mg BID. Please repeat labs again tomorrow. Alkaline phosphatase and bilirubin elevated, please obtain [facility] post-transplant protocol and MRCP. If there is any urgent matters, contact available Hepatologists right away as I am out of country and in different time zone and not

## 2018-01-04 NOTE — TELEPHONE ENCOUNTER
----- Message from Isabel Gilliam RN sent at 12/29/2017  3:36 PM CST -----      ----- Message -----  From: Isabel Gilliam RN  Sent: 12/22/2017   2:30 PM  To: Isabel Gilliam RN        ----- Message -----  From: Georgina Rogers MD  Sent: 12/21/2017   4:54 PM  To: [facility] Post-Liver Transplant Clinical    No change in medication. Patient to start on home PT - which was apparently arranged by inpatient team. He needs to start going to [facility] soon. Labs weekly, return in 2 weeks (I will be on vacation, to see someone in [place]). Thank you.

## 2018-01-05 ENCOUNTER — TELEPHONE (OUTPATIENT)
Dept: HEPATOLOGY | Facility: CLINIC | Age: 28
End: 2018-01-05

## 2018-01-05 ENCOUNTER — TELEPHONE (OUTPATIENT)
Dept: TRANSPLANT | Facility: HOSPITAL | Age: 28
End: 2018-01-05

## 2018-01-05 ENCOUNTER — LAB VISIT (OUTPATIENT)
Dept: LAB | Facility: HOSPITAL | Age: 28
End: 2018-01-05
Attending: INTERNAL MEDICINE
Payer: COMMERCIAL

## 2018-01-05 DIAGNOSIS — R10.9 CHRONIC ABDOMINAL PAIN: ICD-10-CM

## 2018-01-05 DIAGNOSIS — G89.29 CHRONIC ABDOMINAL PAIN: ICD-10-CM

## 2018-01-05 DIAGNOSIS — Z94.4 LIVER REPLACED BY TRANSPLANT: ICD-10-CM

## 2018-01-05 DIAGNOSIS — Z94.4 STATUS POST LIVER TRANSPLANT: ICD-10-CM

## 2018-01-05 DIAGNOSIS — Z94.4 STATUS POST LIVER TRANSPLANT: Primary | ICD-10-CM

## 2018-01-05 LAB
ALBUMIN SERPL BCP-MCNC: 1.9 G/DL
ALP SERPL-CCNC: 173 U/L
ALT SERPL W/O P-5'-P-CCNC: <5 U/L
ANION GAP SERPL CALC-SCNC: 9 MMOL/L
AST SERPL-CCNC: 35 U/L
BASOPHILS # BLD AUTO: 0.04 K/UL
BASOPHILS NFR BLD: 1.4 %
BILIRUB DIRECT SERPL-MCNC: 2.2 MG/DL
BILIRUB SERPL-MCNC: 3.2 MG/DL
BUN SERPL-MCNC: 11 MG/DL
CALCIUM SERPL-MCNC: 8.7 MG/DL
CHLORIDE SERPL-SCNC: 103 MMOL/L
CO2 SERPL-SCNC: 25 MMOL/L
CREAT SERPL-MCNC: 4.2 MG/DL
CYCLOSPORINE BLD LC/MS/MS-MCNC: 296 NG/ML
DIFFERENTIAL METHOD: ABNORMAL
EOSINOPHIL # BLD AUTO: 0 K/UL
EOSINOPHIL NFR BLD: 0.7 %
ERYTHROCYTE [DISTWIDTH] IN BLOOD BY AUTOMATED COUNT: 17.3 %
EST. GFR  (AFRICAN AMERICAN): 20.8 ML/MIN/1.73 M^2
EST. GFR  (NON AFRICAN AMERICAN): 18 ML/MIN/1.73 M^2
GLUCOSE SERPL-MCNC: 78 MG/DL
HCT VFR BLD AUTO: 28.3 %
HGB BLD-MCNC: 9.1 G/DL
IMM GRANULOCYTES # BLD AUTO: 0.01 K/UL
IMM GRANULOCYTES NFR BLD AUTO: 0.4 %
INR PPP: 1.1
LYMPHOCYTES # BLD AUTO: 1.2 K/UL
LYMPHOCYTES NFR BLD: 41.6 %
MCH RBC QN AUTO: 30.1 PG
MCHC RBC AUTO-ENTMCNC: 32.2 G/DL
MCV RBC AUTO: 94 FL
MONOCYTES # BLD AUTO: 0.8 K/UL
MONOCYTES NFR BLD: 26.7 %
NEUTROPHILS # BLD AUTO: 0.8 K/UL
NEUTROPHILS NFR BLD: 29.2 %
NRBC BLD-RTO: 0 /100 WBC
PLATELET # BLD AUTO: 224 K/UL
PMV BLD AUTO: 10.1 FL
POTASSIUM SERPL-SCNC: 4.5 MMOL/L
PROT SERPL-MCNC: 5.1 G/DL
PROTHROMBIN TIME: 12 SEC
RBC # BLD AUTO: 3.02 M/UL
SODIUM SERPL-SCNC: 137 MMOL/L
WBC # BLD AUTO: 2.81 K/UL

## 2018-01-05 PROCEDURE — 36415 COLL VENOUS BLD VENIPUNCTURE: CPT

## 2018-01-05 PROCEDURE — 85025 COMPLETE CBC W/AUTO DIFF WBC: CPT

## 2018-01-05 PROCEDURE — 82248 BILIRUBIN DIRECT: CPT

## 2018-01-05 PROCEDURE — 80053 COMPREHEN METABOLIC PANEL: CPT

## 2018-01-05 PROCEDURE — 80158 DRUG ASSAY CYCLOSPORINE: CPT

## 2018-01-05 PROCEDURE — 85610 PROTHROMBIN TIME: CPT

## 2018-01-05 RX ORDER — OXYCODONE HYDROCHLORIDE 10 MG/1
5-10 TABLET ORAL EVERY 6 HOURS PRN
Qty: 30 TABLET | Refills: 0 | Status: SHIPPED | OUTPATIENT
Start: 2018-01-05 | End: 2018-01-10 | Stop reason: SDUPTHER

## 2018-01-05 NOTE — TELEPHONE ENCOUNTER
EZEKIEL contacted pt's mother ([phone]) who reports that she has not felt pt's physical therapy at home has been helpful as all they do is make him walk a little bit. SW expressed understanding and inquired if PT providers were notified of this. Pt's mother reports that she talked with [facility] about this concern and stated that pt can walk with mother on his own. SW advised that pt and mother discuss benefits in resuming home health PT with Dr. Carter when they see her next Tuesday. Pt's mother expressed that she will be happy to do this. Mother also voicing frustration with communication between Liver Team and pt. Pt's mother reports many appts are being canceled and mother unsure why, specifically why his MRCP canceled. Pt's mother reports that she is concerned that pt is in a lot of pain and dealing with edema and fearful he will need to go back into the hospital for another long hospitalization. SW validated mother's feelings and reported that she would speak with RN coordinator and have her reach out. Pt's mother expressed understanding and had no further questions or concerns. SW remains available.    EZEKIEL reached out to pt's RN coordinator Isabel Gilliam who reports that she has already spoken to the pt's mother and notified her that she had notified mother that she would call her before the end of the day to discuss everything. EZEKIEL expressed understanding and remains available.

## 2018-01-05 NOTE — TELEPHONE ENCOUNTER
Reduce cyclosporine to 75/75.  Planned for US. Based on results will determine if MRCP vs ERCP required.      White count decreased.  No obvious medications.  Repeat labs prior to appt Tuesday.

## 2018-01-08 ENCOUNTER — HOSPITAL ENCOUNTER (OUTPATIENT)
Dept: RADIOLOGY | Facility: HOSPITAL | Age: 28
Discharge: HOME | End: 2018-01-08
Attending: INTERNAL MEDICINE
Payer: COMMERCIAL

## 2018-01-08 DIAGNOSIS — Z94.4 STATUS POST LIVER TRANSPLANT: ICD-10-CM

## 2018-01-08 PROCEDURE — 76705 ECHO EXAM OF ABDOMEN: CPT | Mod: 26,59,, | Performed by: RADIOLOGY

## 2018-01-08 PROCEDURE — 93975 VASCULAR STUDY: CPT | Mod: 26,,, | Performed by: RADIOLOGY

## 2018-01-08 PROCEDURE — 93975 VASCULAR STUDY: CPT | Mod: TC

## 2018-01-09 ENCOUNTER — NURSE TRIAGE (OUTPATIENT)
Dept: ADMINISTRATIVE | Facility: CLINIC | Age: 28
End: 2018-01-09

## 2018-01-09 ENCOUNTER — OFFICE VISIT (OUTPATIENT)
Dept: TRANSPLANT | Facility: CLINIC | Age: 28
End: 2018-01-09
Payer: COMMERCIAL

## 2018-01-09 VITALS
WEIGHT: 162.25 LBS | SYSTOLIC BLOOD PRESSURE: 106 MMHG | DIASTOLIC BLOOD PRESSURE: 60 MMHG | HEART RATE: 95 BPM | TEMPERATURE: 98 F | BODY MASS INDEX: 26.08 KG/M2 | RESPIRATION RATE: 17 BRPM | OXYGEN SATURATION: 97 % | HEIGHT: 66 IN

## 2018-01-09 DIAGNOSIS — Z94.4 S/P LIVER TRANSPLANT: Primary | ICD-10-CM

## 2018-01-09 DIAGNOSIS — Z29.89 PROPHYLACTIC IMMUNOTHERAPY: ICD-10-CM

## 2018-01-09 PROCEDURE — 99215 OFFICE O/P EST HI 40 MIN: CPT | Mod: S$GLB,,, | Performed by: INTERNAL MEDICINE

## 2018-01-09 PROCEDURE — 99999 PR PBB SHADOW E&M-EST. PATIENT-LVL III: CPT | Mod: PBBFAC,,, | Performed by: INTERNAL MEDICINE

## 2018-01-09 RX ORDER — CYCLOSPORINE 25 MG/1
75 CAPSULE, LIQUID FILLED ORAL 2 TIMES DAILY
Qty: 90 CAPSULE | Refills: 5 | Status: ON HOLD
Start: 2018-01-09 | End: 2018-04-09

## 2018-01-09 NOTE — PROGRESS NOTES
Transplant Hepatology  Liver Transplant Recipient Follow-up    Transplant Date: 10/19/2017  UNOS Native Liver Dx: Acute Alcoholic Hepatitis    Mr. Diana is here for follow up of his liver transplant.  He is accompanied by his mother.      ORGAN: LIVER  Whole or Partial: whole liver  Donor Type:  - brain death  CDC High Risk: no  Donor CMV Status: Positive  Donor HCV Status: Negative  Donor HBcAb: Negative  Biliary Anastomosis: end to end  Arterial Anatomy: standard  IVC reconstruction: end to end ivc  Portal vein status: patent    The patient was admitted for acute alcoholic hepatitis complicated by LAURA and underwent OLT on 10/19/2017.  The patient has had a complicated post-operative course.  The patient required HD and had a brief period of increased UOP from -2017 without need for HD.  Urine output decreased and currently is maintained on HD every MWF.  Patient has also had issues with recurrent infection and required 3 post-operative admissions with fever and leukocytosis.  The patient has undergone multiple paracenteses as well as abscess drainage of perihepatic fluid collections.  Treated with antimicrobial therapy but no positive cultures.  The patient continues to struggle with volume overload.  Biliary stricture also identified and ERCP performed on 2017 with sphincterotomy and biliary stent placed.  Liver tests still have not normalized despite intervention.  Bilirubin and AP remain elevated.      The patient has a woundvac for wound infection which required drainage and antimicrobial therapy.  Placed on TPN for a short course due to malnutrition and ongoing hypoalbuminemia.  The patient had seizure activity while on prograf and has been switched to cyclosporine without recurrent episodes.  He remains off of MMF due to recurrent infections.      Subjective:     Interval History:   The patient was last seen in transplant clinic on 2017 by Dr. Rogers.  Complicated post-operative  "course as documented above.      IS is currently cyclosporine 100/100.  MMF held for recurrent infections and leukopenia.      The patient's bilirubin and AP remain abnormal. Liver US yesterday with complex perihepatic fluid collection of 3.9cm along with complex ascites.    The patient continues to complain of abdominal pain.  Reports pain associated with woundvac that is described as sharp and "electric" as well as RUQ pain radiating to back which has been more persistent following transplant.  He continues to require narcotic medication.      The patient continues on HD on MWF.  He notes increased urine output.       Mother expresses concern for altered mental status with patient exhibiting significant forgetfulness as well as jerking of extremities.      Review of Systems   Constitutional: Positive for activity change, appetite change and fatigue. Negative for chills and unexpected weight change.   HENT: Negative for hearing loss and sore throat.    Eyes: Negative for visual disturbance.   Respiratory: Positive for shortness of breath.    Cardiovascular: Positive for leg swelling. Negative for chest pain.   Gastrointestinal: Positive for abdominal distention and abdominal pain. Negative for nausea and vomiting.   Musculoskeletal: Negative for gait problem.   Skin: Negative for rash.   Neurological: Negative for weakness and headaches.   Hematological: Negative for adenopathy. Does not bruise/bleed easily.   Psychiatric/Behavioral: Positive for confusion.       Objective:     Physical Exam   Constitutional: He is oriented to person, place, and time. He appears well-developed and well-nourished. No distress.   Patient sitting in wheelchair   HENT:   Head: Normocephalic and atraumatic.   Mouth/Throat: Oropharynx is clear and moist.   Eyes: Conjunctivae are normal. Pupils are equal, round, and reactive to light. Scleral icterus is present.   Neck: Normal range of motion. Neck supple. No thyromegaly present. "   Cardiovascular: Normal rate, regular rhythm and normal heart sounds.  Exam reveals no gallop and no friction rub.    No murmur heard.  Pulmonary/Chest: Effort normal and breath sounds normal. No respiratory distress. He has no wheezes. He has no rales.   Abdominal: Soft. Bowel sounds are normal. He exhibits distension. There is no tenderness.   woundvac in place    Musculoskeletal: Normal range of motion. He exhibits edema (1+ BLE edema).   Lymphadenopathy:     He has no cervical adenopathy.   Neurological: He is alert and oriented to person, place, and time. No cranial nerve deficit.   Skin: Skin is warm and dry. No erythema.   Psychiatric: He has a normal mood and affect. His behavior is normal.   Vitals reviewed.    Lab Results   Component Value Date    BILITOT 2.9 (H) 01/08/2018    AST 22 01/08/2018    ALT <5 (L) 01/08/2018    ALKPHOS 164 (H) 01/08/2018    CREATININE 4.9 (H) 01/08/2018    ALBUMIN 1.9 (L) 01/08/2018     Lab Results   Component Value Date    WBC 3.17 (L) 01/08/2018    HGB 8.8 (L) 01/08/2018    HCT 26.6 (L) 01/08/2018    HCT 28 (L) 10/20/2017     01/08/2018     Lab Results   Component Value Date    TACROLIMUS <1.5 (L) 11/25/2017    CYCLOSPORINE 238 01/08/2018       Assessment/Plan:   26yo male s/p OLT with complicated course including ongoing abnormal liver tests, volume overload, renal insufficiency and concern for intra-abdominal infection.    IS:  Decrease cyclosporine to 75/75 with goal trough 150-200 given renal insufficiency.  Patient should repeat labs later this week.  Continue to hold MMF for leukopenia and possible infection.    Fluid collection:  Fluid collection described as complex and infection cannot be ruled out.  Discussed with Davis Herrera and Nara and will proceed with paracentesis and percutaneous aspiration to determine if infectious.  Prior cultures have been negative but this may be a source for patient's recurrent episodes of peritonitis and fever as well as  ongoing abnormal liver tests.  Paracentesis and aspiration should be scheduled for non HD day.      Hyperbilirubinemia:  S/p ERCP, plan for stent exchange/removal for 3/6/2017.  Will consider MRCP if fluid collection does not appear to be source of abnormal liver tests.  Also consideration of outflow obstruction given patient's symptoms suggestive of allograft dysfunction including volume overload, possible HE and ongoing renal dysfunction.  Next step in work-up if negative fluid aspiration and MRCP would be transjugular liver biopsy and hepatic pressure measurements.      CKD:  Continuing HD MWF.  Continue to monitor urine output.      Wound infection:  Continue with woundvac and wound care      Malnutrition:  Discussed importance of increased protein intake for wound healing and volume management.     Pain:  Patient will continue to have refills of narcotic medication until pain management appointment.    Alcohol abuse:  Patient is not medically appropriate for ABU at this time given his ongoing medical issues.    RTC in 3-4 weeks     MD EDU [place] Patient Status  Functional Status: 50% - Requires considerable assistance and frequent medical care  Physical Capacity: Limited Mobility

## 2018-01-09 NOTE — LETTER
January 9, 2018                     [facility]  [address]  [facility] [address]  Phone: [phone]   Patient: Jhonny Diana   MR Number: [id number]   YOB: 1990   Date of Visit: 1/9/2018       Dear      Thank you for referring Jhonny Diana to me for evaluation. Attached you will find relevant portions of my assessment and plan of care.    If you have questions, please do not hesitate to call me. I look forward to following Jhonny Diana along with you.    Sincerely,    Quiana Carter MD    Enclosure    If you would like to receive this communication electronically, please contact [email or web address] or [phone] to request [employer] access.    [employer] is a tool which provides read-only access to select patient information with whom you have a relationship. Its easy to use and provides real time access to review your patients record including encounter summaries, notes, results, and demographic information.    If you feel you have received this communication in error or would no longer like to receive these types of communications, please e-mail [email or web address]

## 2018-01-09 NOTE — PATIENT INSTRUCTIONS
We will review your ultrasound with the radiologist to determine if the fluid collection by your liver requires drainage.  We will also perform paracentesis.    Encourage high protein diet and supplemental shakes when nutrition and appetite are poor.    Decrease cyclosporine to 75/75.  Repeat labs for Thursday.      Return to clinic in 3-4 weeks.

## 2018-01-10 ENCOUNTER — TELEPHONE (OUTPATIENT)
Dept: TRANSPLANT | Facility: CLINIC | Age: 28
End: 2018-01-10

## 2018-01-10 ENCOUNTER — TELEPHONE (OUTPATIENT)
Dept: GASTROENTEROLOGY | Facility: CLINIC | Age: 28
End: 2018-01-10

## 2018-01-10 ENCOUNTER — NURSE TRIAGE (OUTPATIENT)
Dept: ADMINISTRATIVE | Facility: CLINIC | Age: 28
End: 2018-01-10

## 2018-01-10 DIAGNOSIS — G89.29 CHRONIC ABDOMINAL PAIN: ICD-10-CM

## 2018-01-10 DIAGNOSIS — Z98.890 POST-OPERATIVE NAUSEA AND VOMITING: ICD-10-CM

## 2018-01-10 DIAGNOSIS — R18.8 OTHER ASCITES: Primary | ICD-10-CM

## 2018-01-10 DIAGNOSIS — Z94.4 STATUS POST LIVER TRANSPLANT: Primary | ICD-10-CM

## 2018-01-10 DIAGNOSIS — R11.2 POST-OPERATIVE NAUSEA AND VOMITING: ICD-10-CM

## 2018-01-10 DIAGNOSIS — R10.9 CHRONIC ABDOMINAL PAIN: ICD-10-CM

## 2018-01-10 LAB — FUNGUS SPEC CULT: NORMAL

## 2018-01-10 RX ORDER — OXYCODONE HYDROCHLORIDE 10 MG/1
5-10 TABLET ORAL EVERY 6 HOURS PRN
Qty: 30 TABLET | Refills: 0 | Status: ON HOLD | OUTPATIENT
Start: 2018-01-10 | End: 2018-04-10 | Stop reason: HOSPADM

## 2018-01-10 RX ORDER — ONDANSETRON 8 MG/1
8 TABLET, ORALLY DISINTEGRATING ORAL EVERY 8 HOURS PRN
Qty: 30 TABLET | Refills: 1 | Status: ON HOLD | OUTPATIENT
Start: 2018-01-10 | End: 2019-11-24

## 2018-01-10 NOTE — TELEPHONE ENCOUNTER
----- Message from Quiana Carter MD sent at 1/8/2018  4:42 PM CST -----  Please add patient for discussion tomorrow.  Re:  MRCP vs fluid collection drainage given elevated bilirubin and AP.  No change in cyclosporine dosing at this time.

## 2018-01-10 NOTE — TELEPHONE ENCOUNTER
----- Message from Anais Cruz sent at 1/10/2018  8:09 AM CST -----  Contact: Mother   Would like a call today regarding, update on patient fever from last night. She spoke with the in call nurse, and was told to follow up.    Patient currently does not have a fever at this time. Please call mother regarding this matter.     Call

## 2018-01-10 NOTE — TELEPHONE ENCOUNTER
Pt was instructed to take tylenol per Dr. Morgan last night. Pt reports no fever today. Will continue to monitor pt temp

## 2018-01-10 NOTE — TELEPHONE ENCOUNTER
----- Message from Yinka Lynch sent at 1/10/2018 12:18 PM CST -----  Contact: Pt mother:[phone]  Pt mother called and states she missed a call from the nurse and would like to speak with the nurse.

## 2018-01-10 NOTE — TELEPHONE ENCOUNTER
"  Reason for Disposition   Transplant patient (e.g., kidney, liver, lung, heart)    Answer Assessment - Initial Assessment Questions  Liver Txp 10/19/2017    1. TEMPERATURE: "What is the most recent temperature?"  "How was it measured?"       Mother reported temp of 100.7 with one thermometer and 100.9 with another   2. ONSET: "When did the fever start?"       This pm  3. SYMPTOMS: "Do you have any other symptoms besides the fever?"  (e.g., colds, headache, sore throat, earache, cough, rash, diarrhea, vomiting, abdominal pain)      Mom denied any other sx's at this time  4. CAUSE: If there are no symptoms, ask: "What do you think is causing the fever?"       n/a  5. CONTACTS: "Does anyone else in the family have an infection?"      Mom denied- pt was seen in clinic today   6. TREATMENT: "What have you done so far to treat this fever?" (e.g., medications)      Nothing   7. IMMUNOCOMPROMISE: "Do you have of the following: diabetes, HIV positive, splenectomy, cancer chemotherapy, chronic steroid treatment, transplant patient, etc."      Transplant   8. PREGNANCY: "Is there any chance you are pregnant?" "When was your last menstrual period?"      n/a  9. TRAVEL: "Have you traveled out of the country in the last month?" (e.g., travel history, exposures)      n/a    Protocols used: ST FEVER-A-    Mom concerned about taking pt to ER with wait and exposures  "

## 2018-01-11 ENCOUNTER — TELEPHONE (OUTPATIENT)
Dept: PHARMACY | Facility: CLINIC | Age: 28
End: 2018-01-11

## 2018-01-11 ENCOUNTER — HOSPITAL ENCOUNTER (INPATIENT)
Facility: HOSPITAL | Age: 28
LOS: 89 days | Discharge: HOME | DRG: 628 | End: 2018-04-10
Attending: EMERGENCY MEDICINE | Admitting: SURGERY
Payer: COMMERCIAL

## 2018-01-11 DIAGNOSIS — B25.8 OTHER CYTOMEGALOVIRAL DISEASES: ICD-10-CM

## 2018-01-11 DIAGNOSIS — D70.8 OTHER NEUTROPENIA: ICD-10-CM

## 2018-01-11 DIAGNOSIS — Z94.4 LIVER TRANSPLANTED: Chronic | ICD-10-CM

## 2018-01-11 DIAGNOSIS — N18.6 ESRD (END STAGE RENAL DISEASE): ICD-10-CM

## 2018-01-11 DIAGNOSIS — E55.9 VITAMIN D DEFICIENCY: ICD-10-CM

## 2018-01-11 DIAGNOSIS — J90 RECURRENT PLEURAL EFFUSION ON RIGHT: ICD-10-CM

## 2018-01-11 DIAGNOSIS — Z91.89 AT RISK FOR OPPORTUNISTIC INFECTIONS: ICD-10-CM

## 2018-01-11 DIAGNOSIS — Z79.60 LONG-TERM USE OF IMMUNOSUPPRESSANT MEDICATION: Chronic | ICD-10-CM

## 2018-01-11 DIAGNOSIS — R53.81 PHYSICAL DECONDITIONING: ICD-10-CM

## 2018-01-11 DIAGNOSIS — D70.9 NEUTROPENIA, UNSPECIFIED TYPE: ICD-10-CM

## 2018-01-11 DIAGNOSIS — N17.0 ACUTE RENAL FAILURE WITH TUBULAR NECROSIS: ICD-10-CM

## 2018-01-11 DIAGNOSIS — R50.9 FEVER, UNSPECIFIED FEVER CAUSE: ICD-10-CM

## 2018-01-11 DIAGNOSIS — R79.89 ELEVATED LIVER FUNCTION TESTS: ICD-10-CM

## 2018-01-11 DIAGNOSIS — R18.8 OTHER ASCITES: ICD-10-CM

## 2018-01-11 DIAGNOSIS — T86.49 BILIARY STRICTURE OF TRANSPLANTED LIVER: Primary | ICD-10-CM

## 2018-01-11 DIAGNOSIS — T81.89XD DELAYED SURGICAL WOUND HEALING, SUBSEQUENT ENCOUNTER: ICD-10-CM

## 2018-01-11 DIAGNOSIS — E83.39 HYPERPHOSPHATEMIA: ICD-10-CM

## 2018-01-11 DIAGNOSIS — R50.9 FEVER: ICD-10-CM

## 2018-01-11 DIAGNOSIS — Z29.89 PROPHYLACTIC IMMUNOTHERAPY: Chronic | ICD-10-CM

## 2018-01-11 DIAGNOSIS — G93.40 ENCEPHALOPATHY: ICD-10-CM

## 2018-01-11 DIAGNOSIS — K59.00 CONSTIPATION, UNSPECIFIED CONSTIPATION TYPE: ICD-10-CM

## 2018-01-11 DIAGNOSIS — K83.1 BILIARY STRICTURE OF TRANSPLANTED LIVER: Primary | ICD-10-CM

## 2018-01-11 DIAGNOSIS — Z94.4 S/P LIVER TRANSPLANT: ICD-10-CM

## 2018-01-11 DIAGNOSIS — J90 PLEURAL EFFUSION, RIGHT: ICD-10-CM

## 2018-01-11 DIAGNOSIS — Z76.82 KIDNEY TRANSPLANT CANDIDATE: ICD-10-CM

## 2018-01-11 DIAGNOSIS — E43 SEVERE PROTEIN-CALORIE MALNUTRITION: ICD-10-CM

## 2018-01-11 DIAGNOSIS — D63.8 ANEMIA OF CHRONIC DISEASE: ICD-10-CM

## 2018-01-11 DIAGNOSIS — R56.9 SEIZURE: ICD-10-CM

## 2018-01-11 DIAGNOSIS — R11.2 NAUSEA AND VOMITING, INTRACTABILITY OF VOMITING NOT SPECIFIED, UNSPECIFIED VOMITING TYPE: ICD-10-CM

## 2018-01-11 DIAGNOSIS — R10.9 ABDOMINAL PAIN, UNSPECIFIED ABDOMINAL LOCATION: ICD-10-CM

## 2018-01-11 DIAGNOSIS — R17 ELEVATED BILIRUBIN: ICD-10-CM

## 2018-01-11 DIAGNOSIS — K65.9 PERITONITIS: ICD-10-CM

## 2018-01-11 LAB
ABO + RH BLD: NORMAL
ALBUMIN FLD-MCNC: 1 G/DL
ALBUMIN SERPL BCP-MCNC: 1.8 G/DL
ALBUMIN SERPL BCP-MCNC: 1.8 G/DL
ALP SERPL-CCNC: 144 U/L
ALP SERPL-CCNC: 153 U/L
ALT SERPL W/O P-5'-P-CCNC: <5 U/L
ALT SERPL W/O P-5'-P-CCNC: <5 U/L
ANION GAP SERPL CALC-SCNC: 10 MMOL/L
ANION GAP SERPL CALC-SCNC: 7 MMOL/L
ANISOCYTOSIS BLD QL SMEAR: SLIGHT
APPEARANCE FLD: CLEAR
AST SERPL-CCNC: 21 U/L
AST SERPL-CCNC: 22 U/L
BASOPHILS # BLD AUTO: 0.02 K/UL
BASOPHILS # BLD AUTO: 0.02 K/UL
BASOPHILS NFR BLD: 0.7 %
BASOPHILS NFR BLD: 0.7 %
BASOPHILS NFR FLD MANUAL: 1 %
BILIRUB FLD-MCNC: 1.5 MG/DL
BILIRUB SERPL-MCNC: 2.6 MG/DL
BILIRUB SERPL-MCNC: 2.7 MG/DL
BLD GP AB SCN CELLS X3 SERPL QL: NORMAL
BODY FLD TYPE: NORMAL
BODY FLUID COMMENTS: 92
BODY FLUID SOURCE, BILIRUBIN: NORMAL
BODY FLUID SOURCE, LDH: NORMAL
BUN SERPL-MCNC: 4 MG/DL
BUN SERPL-MCNC: 5 MG/DL
CALCIUM SERPL-MCNC: 8.1 MG/DL
CALCIUM SERPL-MCNC: 8.2 MG/DL
CHLORIDE SERPL-SCNC: 102 MMOL/L
CHLORIDE SERPL-SCNC: 102 MMOL/L
CO2 SERPL-SCNC: 27 MMOL/L
CO2 SERPL-SCNC: 28 MMOL/L
COLOR FLD: YELLOW
CREAT SERPL-MCNC: 2.4 MG/DL
CREAT SERPL-MCNC: 2.4 MG/DL
CYCLOSPORINE BLD LC/MS/MS-MCNC: 223 NG/ML
DIFFERENTIAL METHOD: ABNORMAL
DIFFERENTIAL METHOD: ABNORMAL
EOSINOPHIL # BLD AUTO: 0.1 K/UL
EOSINOPHIL # BLD AUTO: 0.1 K/UL
EOSINOPHIL NFR BLD: 1.7 %
EOSINOPHIL NFR BLD: 1.8 %
ERYTHROCYTE [DISTWIDTH] IN BLOOD BY AUTOMATED COUNT: 17.7 %
ERYTHROCYTE [DISTWIDTH] IN BLOOD BY AUTOMATED COUNT: 18 %
EST. GFR  (AFRICAN AMERICAN): 40.9 ML/MIN/1.73 M^2
EST. GFR  (AFRICAN AMERICAN): 40.9 ML/MIN/1.73 M^2
EST. GFR  (NON AFRICAN AMERICAN): 35.4 ML/MIN/1.73 M^2
EST. GFR  (NON AFRICAN AMERICAN): 35.4 ML/MIN/1.73 M^2
FLUAV AG SPEC QL IA: NEGATIVE
FLUBV AG SPEC QL IA: NEGATIVE
GLUCOSE FLD-MCNC: 55 MG/DL
GLUCOSE SERPL-MCNC: 80 MG/DL
GLUCOSE SERPL-MCNC: 89 MG/DL
GRAM STN SPEC: NORMAL
HCT VFR BLD AUTO: 23.9 %
HCT VFR BLD AUTO: 24 %
HGB BLD-MCNC: 7.6 G/DL
HGB BLD-MCNC: 7.8 G/DL
IMM GRANULOCYTES # BLD AUTO: 0.02 K/UL
IMM GRANULOCYTES # BLD AUTO: 0.04 K/UL
IMM GRANULOCYTES NFR BLD AUTO: 0.7 %
IMM GRANULOCYTES NFR BLD AUTO: 1.4 %
INR PPP: 1.2
INR PPP: 1.2
LACTATE SERPL-SCNC: 0.8 MMOL/L
LDH FLD L TO P-CCNC: 333 U/L
LDH SERPL L TO P-CCNC: 324 U/L
LIPASE SERPL-CCNC: 5 U/L
LYMPHOCYTES # BLD AUTO: 0.9 K/UL
LYMPHOCYTES # BLD AUTO: 1 K/UL
LYMPHOCYTES NFR BLD: 30.6 %
LYMPHOCYTES NFR BLD: 34.5 %
LYMPHOCYTES NFR FLD MANUAL: 49 %
LYMPHOCYTES NFR FLD MANUAL: 50 %
LYMPHOCYTES NFR FLD MANUAL: 59 %
MAGNESIUM SERPL-MCNC: 1.7 MG/DL
MCH RBC QN AUTO: 30.5 PG
MCH RBC QN AUTO: 30.6 PG
MCHC RBC AUTO-ENTMCNC: 31.8 G/DL
MCHC RBC AUTO-ENTMCNC: 32.5 G/DL
MCV RBC AUTO: 94 FL
MCV RBC AUTO: 96 FL
MONOCYTES # BLD AUTO: 0.9 K/UL
MONOCYTES # BLD AUTO: 1 K/UL
MONOCYTES NFR BLD: 32.4 %
MONOCYTES NFR BLD: 34.4 %
MONOS+MACROS NFR FLD MANUAL: 28 %
MONOS+MACROS NFR FLD MANUAL: 48 %
MONOS+MACROS NFR FLD MANUAL: 51 %
NEUTROPHILS # BLD AUTO: 0.8 K/UL
NEUTROPHILS # BLD AUTO: 0.9 K/UL
NEUTROPHILS NFR BLD: 29.9 %
NEUTROPHILS NFR BLD: 31.2 %
NEUTROPHILS NFR FLD MANUAL: 1 %
NEUTROPHILS NFR FLD MANUAL: 13 %
NRBC BLD-RTO: 0 /100 WBC
NRBC BLD-RTO: 0 /100 WBC
OVALOCYTES BLD QL SMEAR: ABNORMAL
PLATELET # BLD AUTO: 205 K/UL
PLATELET # BLD AUTO: 209 K/UL
PLATELET BLD QL SMEAR: ABNORMAL
PMV BLD AUTO: 9.6 FL
PMV BLD AUTO: 9.9 FL
POIKILOCYTOSIS BLD QL SMEAR: SLIGHT
POLYCHROMASIA BLD QL SMEAR: ABNORMAL
POTASSIUM SERPL-SCNC: 3.1 MMOL/L
POTASSIUM SERPL-SCNC: 3.7 MMOL/L
PROT FLD-MCNC: 2 G/DL
PROT SERPL-MCNC: 4.7 G/DL
PROT SERPL-MCNC: 4.9 G/DL
PROTHROMBIN TIME: 12.4 SEC
PROTHROMBIN TIME: 12.7 SEC
RBC # BLD AUTO: 2.48 M/UL
RBC # BLD AUTO: 2.56 M/UL
SODIUM SERPL-SCNC: 137 MMOL/L
SODIUM SERPL-SCNC: 139 MMOL/L
SPECIMEN SOURCE: NORMAL
WBC # BLD AUTO: 2.81 K/UL
WBC # BLD AUTO: 2.94 K/UL
WBC # FLD: 66 /CU MM
WBC # FLD: 99 /CU MM

## 2018-01-11 PROCEDURE — 99223 1ST HOSP IP/OBS HIGH 75: CPT | Mod: ,,, | Performed by: INTERNAL MEDICINE

## 2018-01-11 PROCEDURE — 80053 COMPREHEN METABOLIC PANEL: CPT

## 2018-01-11 PROCEDURE — 96375 TX/PRO/DX INJ NEW DRUG ADDON: CPT

## 2018-01-11 PROCEDURE — 63600175 PHARM REV CODE 636 W HCPCS: Performed by: RADIOLOGY

## 2018-01-11 PROCEDURE — 20600001 HC STEP DOWN PRIVATE ROOM

## 2018-01-11 PROCEDURE — 85025 COMPLETE CBC W/AUTO DIFF WBC: CPT | Mod: 91

## 2018-01-11 PROCEDURE — 89051 BODY FLUID CELL COUNT: CPT | Mod: 91

## 2018-01-11 PROCEDURE — 0W9G3ZZ DRAINAGE OF PERITONEAL CAVITY, PERCUTANEOUS APPROACH: ICD-10-PCS | Performed by: INTERNAL MEDICINE

## 2018-01-11 PROCEDURE — 87205 SMEAR GRAM STAIN: CPT | Mod: 59

## 2018-01-11 PROCEDURE — 80158 DRUG ASSAY CYCLOSPORINE: CPT

## 2018-01-11 PROCEDURE — 83735 ASSAY OF MAGNESIUM: CPT

## 2018-01-11 PROCEDURE — 87400 INFLUENZA A/B EACH AG IA: CPT | Mod: 59

## 2018-01-11 PROCEDURE — 25000003 PHARM REV CODE 250: Performed by: NURSE PRACTITIONER

## 2018-01-11 PROCEDURE — 83615 LACTATE (LD) (LDH) ENZYME: CPT

## 2018-01-11 PROCEDURE — 83615 LACTATE (LD) (LDH) ENZYME: CPT | Mod: 91

## 2018-01-11 PROCEDURE — 80053 COMPREHEN METABOLIC PANEL: CPT | Mod: 91

## 2018-01-11 PROCEDURE — 89051 BODY FLUID CELL COUNT: CPT

## 2018-01-11 PROCEDURE — 87102 FUNGUS ISOLATION CULTURE: CPT

## 2018-01-11 PROCEDURE — 99285 EMERGENCY DEPT VISIT HI MDM: CPT | Mod: 25

## 2018-01-11 PROCEDURE — 85610 PROTHROMBIN TIME: CPT | Mod: 91

## 2018-01-11 PROCEDURE — 82247 BILIRUBIN TOTAL: CPT

## 2018-01-11 PROCEDURE — 0W993ZZ DRAINAGE OF RIGHT PLEURAL CAVITY, PERCUTANEOUS APPROACH: ICD-10-PCS | Performed by: RADIOLOGY

## 2018-01-11 PROCEDURE — 87075 CULTR BACTERIA EXCEPT BLOOD: CPT | Mod: 59

## 2018-01-11 PROCEDURE — 63600175 PHARM REV CODE 636 W HCPCS: Performed by: EMERGENCY MEDICINE

## 2018-01-11 PROCEDURE — 82042 OTHER SOURCE ALBUMIN QUAN EA: CPT

## 2018-01-11 PROCEDURE — 25000003 PHARM REV CODE 250: Performed by: PHYSICIAN ASSISTANT

## 2018-01-11 PROCEDURE — 84157 ASSAY OF PROTEIN OTHER: CPT

## 2018-01-11 PROCEDURE — 96365 THER/PROPH/DIAG IV INF INIT: CPT

## 2018-01-11 PROCEDURE — 63600175 PHARM REV CODE 636 W HCPCS: Performed by: STUDENT IN AN ORGANIZED HEALTH CARE EDUCATION/TRAINING PROGRAM

## 2018-01-11 PROCEDURE — 63600175 PHARM REV CODE 636 W HCPCS

## 2018-01-11 PROCEDURE — 87070 CULTURE OTHR SPECIMN AEROBIC: CPT

## 2018-01-11 PROCEDURE — 63600175 PHARM REV CODE 636 W HCPCS: Performed by: SURGERY

## 2018-01-11 PROCEDURE — 87070 CULTURE OTHR SPECIMN AEROBIC: CPT | Mod: 59

## 2018-01-11 PROCEDURE — 99223 1ST HOSP IP/OBS HIGH 75: CPT | Mod: 24,,, | Performed by: NURSE PRACTITIONER

## 2018-01-11 PROCEDURE — 87116 MYCOBACTERIA CULTURE: CPT

## 2018-01-11 PROCEDURE — 63600175 PHARM REV CODE 636 W HCPCS: Performed by: NURSE PRACTITIONER

## 2018-01-11 PROCEDURE — 82945 GLUCOSE OTHER FLUID: CPT

## 2018-01-11 PROCEDURE — 86920 COMPATIBILITY TEST SPIN: CPT

## 2018-01-11 PROCEDURE — 99285 EMERGENCY DEPT VISIT HI MDM: CPT | Mod: ,,, | Performed by: EMERGENCY MEDICINE

## 2018-01-11 PROCEDURE — 87040 BLOOD CULTURE FOR BACTERIA: CPT

## 2018-01-11 PROCEDURE — 83605 ASSAY OF LACTIC ACID: CPT

## 2018-01-11 PROCEDURE — 94761 N-INVAS EAR/PLS OXIMETRY MLT: CPT

## 2018-01-11 PROCEDURE — 99233 SBSQ HOSP IP/OBS HIGH 50: CPT | Mod: 24,,, | Performed by: NURSE PRACTITIONER

## 2018-01-11 PROCEDURE — 86850 RBC ANTIBODY SCREEN: CPT

## 2018-01-11 PROCEDURE — 85610 PROTHROMBIN TIME: CPT

## 2018-01-11 PROCEDURE — 83690 ASSAY OF LIPASE: CPT

## 2018-01-11 RX ORDER — HYDROMORPHONE HYDROCHLORIDE 1 MG/ML
0.5 INJECTION, SOLUTION INTRAMUSCULAR; INTRAVENOUS; SUBCUTANEOUS
Status: DISCONTINUED | OUTPATIENT
Start: 2018-01-11 | End: 2018-01-12

## 2018-01-11 RX ORDER — SODIUM CHLORIDE 0.9 % (FLUSH) 0.9 %
3 SYRINGE (ML) INJECTION
Status: DISCONTINUED | OUTPATIENT
Start: 2018-01-11 | End: 2018-01-17 | Stop reason: SDUPTHER

## 2018-01-11 RX ORDER — OXYCODONE HYDROCHLORIDE 5 MG/1
10 TABLET ORAL EVERY 6 HOURS PRN
Status: DISCONTINUED | OUTPATIENT
Start: 2018-01-11 | End: 2018-01-12

## 2018-01-11 RX ORDER — PROCHLORPERAZINE EDISYLATE 5 MG/ML
10 INJECTION INTRAMUSCULAR; INTRAVENOUS EVERY 6 HOURS PRN
Status: DISCONTINUED | OUTPATIENT
Start: 2018-01-11 | End: 2018-04-10 | Stop reason: HOSPADM

## 2018-01-11 RX ORDER — ONDANSETRON 2 MG/ML
4 INJECTION INTRAMUSCULAR; INTRAVENOUS EVERY 6 HOURS PRN
Status: DISCONTINUED | OUTPATIENT
Start: 2018-01-11 | End: 2018-04-10 | Stop reason: HOSPADM

## 2018-01-11 RX ORDER — OXYCODONE HYDROCHLORIDE 5 MG/1
5 TABLET ORAL EVERY 6 HOURS PRN
Status: DISCONTINUED | OUTPATIENT
Start: 2018-01-11 | End: 2018-01-12

## 2018-01-11 RX ORDER — DOCUSATE SODIUM 100 MG/1
100 CAPSULE, LIQUID FILLED ORAL 3 TIMES DAILY PRN
Status: DISCONTINUED | OUTPATIENT
Start: 2018-01-11 | End: 2018-01-16

## 2018-01-11 RX ORDER — ONDANSETRON 2 MG/ML
4 INJECTION INTRAMUSCULAR; INTRAVENOUS
Status: COMPLETED | OUTPATIENT
Start: 2018-01-11 | End: 2018-01-11

## 2018-01-11 RX ORDER — PANTOPRAZOLE SODIUM 40 MG/1
40 TABLET, DELAYED RELEASE ORAL DAILY
Status: DISCONTINUED | OUTPATIENT
Start: 2018-01-11 | End: 2018-01-18

## 2018-01-11 RX ORDER — MIDAZOLAM HYDROCHLORIDE 1 MG/ML
INJECTION INTRAMUSCULAR; INTRAVENOUS CODE/TRAUMA/SEDATION MEDICATION
Status: COMPLETED | OUTPATIENT
Start: 2018-01-11 | End: 2018-01-11

## 2018-01-11 RX ORDER — HYDROMORPHONE HYDROCHLORIDE 2 MG/ML
0.5 INJECTION, SOLUTION INTRAMUSCULAR; INTRAVENOUS; SUBCUTANEOUS ONCE
Status: COMPLETED | OUTPATIENT
Start: 2018-01-11 | End: 2018-01-11

## 2018-01-11 RX ORDER — CEFEPIME HYDROCHLORIDE 1 G/1
1 INJECTION, POWDER, FOR SOLUTION INTRAMUSCULAR; INTRAVENOUS
Status: DISCONTINUED | OUTPATIENT
Start: 2018-01-11 | End: 2018-01-11

## 2018-01-11 RX ORDER — THIAMINE HCL 100 MG
100 TABLET ORAL DAILY
Status: DISCONTINUED | OUTPATIENT
Start: 2018-01-11 | End: 2018-01-18

## 2018-01-11 RX ORDER — HYDROMORPHONE HYDROCHLORIDE 2 MG/ML
INJECTION, SOLUTION INTRAMUSCULAR; INTRAVENOUS; SUBCUTANEOUS
Status: COMPLETED
Start: 2018-01-11 | End: 2018-01-11

## 2018-01-11 RX ORDER — ATOVAQUONE 750 MG/5ML
1500 SUSPENSION ORAL DAILY
Status: DISCONTINUED | OUTPATIENT
Start: 2018-01-11 | End: 2018-01-18

## 2018-01-11 RX ORDER — HYDROMORPHONE HYDROCHLORIDE 2 MG/ML
0.5 INJECTION, SOLUTION INTRAMUSCULAR; INTRAVENOUS; SUBCUTANEOUS
Status: COMPLETED | OUTPATIENT
Start: 2018-01-11 | End: 2018-01-11

## 2018-01-11 RX ORDER — POTASSIUM CHLORIDE 20 MEQ/1
40 TABLET, EXTENDED RELEASE ORAL ONCE
Status: COMPLETED | OUTPATIENT
Start: 2018-01-11 | End: 2018-01-11

## 2018-01-11 RX ORDER — LEVETIRACETAM 500 MG/1
500 TABLET ORAL 2 TIMES DAILY
Status: DISCONTINUED | OUTPATIENT
Start: 2018-01-11 | End: 2018-01-18

## 2018-01-11 RX ORDER — ONDANSETRON 8 MG/1
8 TABLET, ORALLY DISINTEGRATING ORAL EVERY 6 HOURS PRN
Status: DISCONTINUED | OUTPATIENT
Start: 2018-01-11 | End: 2018-04-10 | Stop reason: HOSPADM

## 2018-01-11 RX ORDER — URSODIOL 300 MG/1
300 CAPSULE ORAL 2 TIMES DAILY
Status: DISCONTINUED | OUTPATIENT
Start: 2018-01-11 | End: 2018-01-18

## 2018-01-11 RX ORDER — LEVOTHYROXINE SODIUM 75 UG/1
75 TABLET ORAL
Status: DISCONTINUED | OUTPATIENT
Start: 2018-01-11 | End: 2018-04-10 | Stop reason: HOSPADM

## 2018-01-11 RX ORDER — ACETAMINOPHEN 325 MG/1
650 TABLET ORAL EVERY 8 HOURS PRN
Status: DISCONTINUED | OUTPATIENT
Start: 2018-01-11 | End: 2018-01-18

## 2018-01-11 RX ORDER — SODIUM BICARBONATE 650 MG/1
1300 TABLET ORAL 2 TIMES DAILY
Status: DISCONTINUED | OUTPATIENT
Start: 2018-01-11 | End: 2018-01-11

## 2018-01-11 RX ORDER — CEFEPIME HYDROCHLORIDE 1 G/1
1 INJECTION, POWDER, FOR SOLUTION INTRAMUSCULAR; INTRAVENOUS
Status: COMPLETED | OUTPATIENT
Start: 2018-01-11 | End: 2018-01-11

## 2018-01-11 RX ADMIN — HYDROMORPHONE HYDROCHLORIDE 0.5 MG: 2 INJECTION INTRAMUSCULAR; INTRAVENOUS; SUBCUTANEOUS at 10:01

## 2018-01-11 RX ADMIN — OXYCODONE HYDROCHLORIDE 10 MG: 5 TABLET ORAL at 07:01

## 2018-01-11 RX ADMIN — POTASSIUM CHLORIDE 40 MEQ: 1500 TABLET, EXTENDED RELEASE ORAL at 05:01

## 2018-01-11 RX ADMIN — HYDROMORPHONE HYDROCHLORIDE 0.5 MG: 2 INJECTION INTRAMUSCULAR; INTRAVENOUS; SUBCUTANEOUS at 01:01

## 2018-01-11 RX ADMIN — Medication 100 MG: at 10:01

## 2018-01-11 RX ADMIN — ONDANSETRON 4 MG: 2 INJECTION INTRAMUSCULAR; INTRAVENOUS at 01:01

## 2018-01-11 RX ADMIN — CEFEPIME 1 G: 1 INJECTION, POWDER, FOR SOLUTION INTRAMUSCULAR; INTRAVENOUS at 02:01

## 2018-01-11 RX ADMIN — OXYCODONE HYDROCHLORIDE 10 MG: 5 TABLET ORAL at 09:01

## 2018-01-11 RX ADMIN — LEVETIRACETAM 500 MG: 500 TABLET ORAL at 10:01

## 2018-01-11 RX ADMIN — MIDAZOLAM HYDROCHLORIDE 1 MG: 1 INJECTION, SOLUTION INTRAMUSCULAR; INTRAVENOUS at 02:01

## 2018-01-11 RX ADMIN — CYCLOSPORINE 75 MG: 25 CAPSULE, LIQUID FILLED ORAL at 09:01

## 2018-01-11 RX ADMIN — HYDROMORPHONE HYDROCHLORIDE 2 MG: 2 INJECTION INTRAMUSCULAR; INTRAVENOUS; SUBCUTANEOUS at 11:01

## 2018-01-11 RX ADMIN — URSODIOL 300 MG: 300 CAPSULE ORAL at 09:01

## 2018-01-11 RX ADMIN — PANTOPRAZOLE SODIUM 40 MG: 40 TABLET, DELAYED RELEASE ORAL at 10:01

## 2018-01-11 RX ADMIN — THERA TABS 1 TABLET: TAB at 10:01

## 2018-01-11 RX ADMIN — LEVETIRACETAM 500 MG: 500 TABLET ORAL at 09:01

## 2018-01-11 RX ADMIN — URSODIOL 300 MG: 300 CAPSULE ORAL at 10:01

## 2018-01-11 RX ADMIN — SODIUM BICARBONATE 650 MG TABLET 1300 MG: at 10:01

## 2018-01-11 RX ADMIN — CEFEPIME HYDROCHLORIDE 1 G: 1 INJECTION, SOLUTION INTRAVENOUS at 04:01

## 2018-01-11 RX ADMIN — MIDAZOLAM HYDROCHLORIDE 0.5 MG: 1 INJECTION, SOLUTION INTRAMUSCULAR; INTRAVENOUS at 02:01

## 2018-01-11 RX ADMIN — LEVOTHYROXINE SODIUM 75 MCG: 75 TABLET ORAL at 05:01

## 2018-01-11 RX ADMIN — Medication 1000 MG: at 03:01

## 2018-01-11 RX ADMIN — CYCLOSPORINE 75 MG: 25 CAPSULE, LIQUID FILLED ORAL at 10:01

## 2018-01-11 RX ADMIN — ONDANSETRON HYDROCHLORIDE 4 MG: 2 INJECTION, SOLUTION INTRAMUSCULAR; INTRAVENOUS at 11:01

## 2018-01-11 NOTE — ASSESSMENT & PLAN NOTE
- hold valcyte as wbc low.  CMV PCR pending.  - Continue Bactrim for PCP proph  - isavu for fungal prophylaxis 2/2 complicated post-op course

## 2018-01-11 NOTE — HPI
Jhonny Diana is a 28 y/o male with past medical history of alcoholic cirrhosis.  S/p DDLT 10/19/2017; c/b seizures (swtiched off prograf to cyclo), ATN requiring HD (M-W-F, last 1/10, anuric), superficial wound infection s/p wound vac to chevron incision, and multiple admissions for fevers on 11/21 (discharged on empiric augmentin for suspected superficial wound infection), readmitted 11/24 again with fever, and 12/3. Found to have peritonitis in November (WBC 5000, 75% PNM) neg for bile leak. He was treated initially with vanc/cefepime. Repeat cell counts 11/29 with some improvement (WBC 1400, 45% PNM). He has undergone multiple paracenteses as well as abscess drainage of perihepatic fluid collections and treated with antimicrobial therapy but no positive cultures. Of note, biliary stricture also identified and ERCP performed on 12/6/2017 with sphincterotomy and biliary stent placed. Liver tests still have not normalized despite intervention, bilirubin and AP remain elevated. Other pertinent PMH current wound vac in place 2/2 wound infection, malnutrition requiring TPN for short course and ongoing hypoalbuminemia, and seizure activity while on prograf and has since been switched to cyclosporine without reoccurrence.  He presented to the ER for fever, abdominal pain, and N/V. He reports fever (103) for 1 day prior. Overnight, he developed N/V, reports small amount of green emesis with new left sided pain. He also endorses worsening SOB with exertion. He was scheduled as an outpatient for follow up paracentesis and IR drainage of fluid collection. CXR in ER shows large pleural effusion with subsegmental atelectasis. Infectious work up initiated in ER. His ANC is 900. Broad spectrum antibiotics initiated in ED. At admit, he denied chest pain, palpitations, diarrhea, constipation, back pain, or any sick contacts.

## 2018-01-11 NOTE — HOSPITAL COURSE
Unclear cause of ongoing fevers. Multiple infectious, diagnostic testing completed. Taken to OR for ex lap 2/6, loculated fluid collections found, cultures NGTD. Multiple blood cultures with NGTD. Thoracentesis 1/11, 1/18, 1/31, and 2/16, cultures NGTD. Paracentesis 1/11, 1/19, and 2/5, cultures NGTD. negative fungal markers/quant gold intermediate. Negative TTE.  IR drainage 1/11, cultures NGTD. Continued with intermittent fevers despite broad spectrum antibiotics that were initially started 1/11, then broadened antibiotics 2/22 from vanc/zosyn to ertapenem. Multiple CT scan of abd obtained that were unremarkable. Tagged WBCs scan 2/27 without source of infection found. He had several episodes of neutropenia requiring neupogen, last given 2/28. 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 Monitoring weekly CMV PCR. Chimerism studies obtained 3/1 to assess for GVHD, which was negative, EBV PCR negative, adenovirus negative. In addition to fevers, he has consistently c/o abdominal pain and N/V. Tbili has remain elevated post-op requiring multiple ERCPs. ERCPs performed this admission on 1/30, 2/19, and 3/9. ERCPs with sludge and stones with stents placed. Last ERCP 3/9 with metal stent placed into CBD and stones removed. His ertapenem was d/cd 3/7 due to AMS. Following ERCP 3/9, he was started on cefepime and flagyl for possible cholangitis. He has remained afebrile since 3/10. He completed cefepime/flagyl after 3 weeks on 3/19 & 3/20. Due to elevated enzymes, he had a liver biopsy 1/23 consistent with cholestatic hepatitis (no cultures obtained during biopsy). GI was consulted for persistent N/V and malnutrition. EGD performed 1/25 and 3/6, EGD 3/6 with normal esophagus congested, erythematous and nodular mucosa in the stomach, one duodenal ulcer with a clean ulcer base (Emir Class III), biopsies taken, negative for CMV and GVHD. Due to N/V  and abdominal pain, multiple attempts were made to place SHANE tube with tube feeding, but patient did not tolerate TF. Therefore, he was transitioned to TPN 1/16 then again on 2/24. He also has chronic constipation. Small bowel follow through study with normal findings. Following EGD 1/25, he was started on a PPI, miralax, and Metamucil, but he did not tolerate meds. For appetite stimulation he was trialed on marinol. He also did well with reglan before meals and at bedtime. Early post-op liver transplant surgery, he was switched to cyclosporine for seizures. During this admission, he was transitioned back to prograf 2/24 with keppra on board. Starting 3/1 he developed stuttering speech, disorientation. CT head 3/1 and MRI head 3/6 unremarkable. EEG 3/8 with no seizure activity. He started on rapamune and stress dose steroids 3/10, then ultimately returned to cyclosporine 3/13 and rapamune d/cd same day. Since transitioning back to cyclosporine, his mental status has remained stable. During admission, patient was evaluated by [facility] for future kidney transplant, and he was deemed to be a suitable candidate once medically stable and cleared. He dialyzes on a M-W-F schedule. He was anuric on admission, but starting producing urine around 3/16.  Now producing >2-3L per day.       Interval History: No acute events overnight.  Tolerating diet, nausea and vomiting improved. He feels well this morning. Motivated to eat and ambulate. HD planned today per nephrology.  Cont to monitor.

## 2018-01-11 NOTE — ASSESSMENT & PLAN NOTE
26 y/o male with hx of asthma and alcoholic cirrhosis s/p DDLT 10/19/2017 (c/b HE, EV, portal HTN, and HRS on HD; CMV D+/R+, steroid induction, on maintenance cyclosporine/MMF; c/b seizures 2/2 to tacro, superificial wound infection s/p wound vac to [facility], multiple episodes of peritonitis (most recently in 12/2017 suspected 2/2 to biliary leakage, found to have biliary stricture on ERCP 12/6 s/p sphincterotomy/biliary stent placement in CBD and abdominal drain placement 12/13/2017 s/p treatment with flagyl/cipro, s/p multiple paracenteses as well as abscess drainage of perihepatic fluid collections and treated with antimicrobial therapy but no positive cultures ) admitted 1/11/18 for 1 day hx of objective fever of 103, nausea, 1 episode of green emesis and L sided abdo pain. Pt had planned para/abscess drainage scheduled for 1/11 for a 3.9 complex fluid collection anterior to R lobe found on liver U/S on 1/4. Workup revealing R pleural effusion on CXR. Blood cultures in process. Currently on broad spectrum abx (cefepime and vanc).    Patient scheduled for IR guided para, thora, and abscess drainage today.     Recommendations:  -please send fluid samples off for AFB stain, fungal/bacterial cultures and gram stain, glucose, WBC, and proteins, and LDH.   -will order viral PCR and respiratory culture/gram stain  -would continue with vanco (given hx of SSTI) and empiric cefepime until cultures result

## 2018-01-11 NOTE — ED NOTES
Care assumed. Hourly rounding complete. Patient resting in stretcher and is in NAD at this time. Pt sleeping, easily aroused to verbal stimuli, oriented x4, VSS, respirations even and unlabored. Pt c/o 7/10 to right abdomen wound vac site, liver transplant team notified and states will write order for pain medication. Pt updated on POC. Bed low and locked with side rails up x2, call bell in pt reach. Pt voices no other needs at this time.

## 2018-01-11 NOTE — ASSESSMENT & PLAN NOTE
- h/o constipation, reports having regular BM's with bowel regimen  - vomiting episode x 1 at home. Anti-emetics ordered.  - KUB pending.

## 2018-01-11 NOTE — ASSESSMENT & PLAN NOTE
- poor PO intake since transplant requiring short course of TPN.   - albumin remains decreased. Dietary consulted.

## 2018-01-11 NOTE — ASSESSMENT & PLAN NOTE
- post op course complicated by fevers and hyperbilirubinemia  - ERCP 12/6 with post-anastomosis stricture with stent placement  - AST/ALT normal, Tbili/alkphos remain elevated  - Liver US 1/8 showed 3.9cm complex fluid collection anterior to right lobe and moderate nonspecific complex ascites inferior to transplant

## 2018-01-11 NOTE — H&P
[facility]  Liver Transplant  History & Physical    Patient Name: Jhonny Diana  MRN: [id number]  Admission Date: 2018  Code Status: Full Code  Primary Care Provider: Primary Doctor No  Post-Operative Day: 84     ORGAN:   LIVER  Disease Etiology: Acute Alcoholic Hepatitis  Donor Type:    - Brain Death  [facility] High Risk:   No  Donor CMV Status:   Donor CMV Status: Positive  Donor HBcAB:   Negative  Donor HCV Status:   Negative  Whole or Partial: Whole Liver  Biliary Anastomosis: End to End  Arterial Anatomy: Standard    Subjective:     History of Present Illness:  Jhonny Diana is a 26 y/o male with past medical history of alcoholic cirrhosis.  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 Found to have peritonitis in November (WBC 5000, 75% PNM) neg for bile leak. He was treated initially with vanc/cefepime. Repeat cell counts  with some improvement (WBC 1400, 45% PNM). He has undergone multiple paracenteses as well as abscess drainage of perihepatic fluid collections and treated with antimicrobial therapy but no positive cultures. Of note, biliary stricture also identified and ERCP performed on 2017 with sphincterotomy and biliary stent placed. Liver tests still have not normalized despite intervention, bilirubin and AP remain elevated. Other pertinent PMH current wound vac in place 2/2 wound infection, malnutrition requiring TPN for short course and ongoing hypoalbuminemia, and seizure activity while on prograf and has since been switched to cyclosporine without reoccurrence.  He presented to the ER for fever, abdominal pain, and N/V. He reports fever (103) for 1 day prior. Overnight, he  developed N/V, reports small amount of green emesis with new left sided pain. He also endorses worsening SOB with exertion. He was scheduled as an outpatient for follow up paracentesis and IR drainage of fluid collection. CXR in ER shows large pleural effusion with subsegmental atelectasis. Infectious work up initiated in ER. His ANC is 900. Broad spectrum antibiotics initiated in ED. He denies chest pain, palpitations, diarrhea, constipation, or back pain. Denies any sick contacts.    Past Medical History:   Diagnosis Date    Alcoholic hepatitis with ascites     Alcoholic hepatitis with ascites     History of hematemesis 9/28/2017    Hypertension     Renal disorder        Past Surgical History:   Procedure Laterality Date    APPENDECTOMY         Review of patient's allergies indicates:   Allergen Reactions    Bactrim [sulfamethoxazole-trimethoprim] Other (See Comments)     Mookie Trell Syndrome       Family History     None        Social History Main Topics    Smoking status: Former Smoker     Types: Cigarettes    Smokeless tobacco: Never Used    Alcohol use No      Comment: a fifth of liquor daily for years, cut back over last 2 months    Drug use: No    Sexual activity: Not on file         (Not in a hospital admission)    Review of Systems   Constitutional: Positive for activity change, appetite change and fever. Negative for chills and fatigue.   Respiratory: Negative for cough, shortness of breath and wheezing.    Cardiovascular: Positive for leg swelling. Negative for chest pain.   Gastrointestinal: Positive for abdominal distention and abdominal pain. Negative for constipation, nausea and vomiting.   Genitourinary: Negative for difficulty urinating and dysuria.        Anuric   Musculoskeletal: Negative for arthralgias, back pain and myalgias.   Skin: Positive for wound (chevron incision with wound vac). Negative for color change and rash.   Allergic/Immunologic: Positive for immunocompromised  state.   Neurological: Positive for weakness. Negative for dizziness, seizures and headaches.   Psychiatric/Behavioral: Negative for confusion and decreased concentration. The patient is not nervous/anxious.      Objective:     Vital Signs (Most Recent):  Temp: 98.9 °F (37.2 °C) (01/11/18 0328)  Pulse: 93 (01/11/18 0351)  Resp: 18 (01/11/18 0351)  BP: (!) 101/59 (01/11/18 0321)  SpO2: 97 % (01/11/18 0351) Vital Signs (24h Range):  Temp:  [98.9 °F (37.2 °C)-99.3 °F (37.4 °C)] 98.9 °F (37.2 °C)  Pulse:  [] 93  Resp:  [16-22] 18  SpO2:  [96 %-98 %] 97 %  BP: (100-102)/(50-59) 101/59     Weight: 72.6 kg (160 lb)  Body mass index is 25.82 kg/m².    No intake or output data in the 24 hours ending 01/11/18 0413    Physical Exam   Constitutional: He is oriented to person, place, and time. He appears well-developed. No distress.   Temporal and distal extremity muscle wasting   HENT:   Head: Normocephalic and atraumatic.   Eyes: No scleral icterus.   Neck: Normal range of motion. Neck supple. No JVD present.   Cardiovascular: Normal rate, regular rhythm, normal heart sounds and intact distal pulses.    No murmur heard.  Pulmonary/Chest: Effort normal. No respiratory distress. He has decreased breath sounds in the right middle field, the right lower field and the left lower field. He has no wheezes. He exhibits no tenderness.   Abdominal: Soft. Bowel sounds are normal. He exhibits distension and ascites. There is tenderness (mild). There is no rebound and no guarding.   Wound vac to incision   Musculoskeletal: Normal range of motion. He exhibits edema (2+ LE edema). He exhibits no tenderness.   Neurological: He is alert and oriented to person, place, and time. He has normal reflexes.   Skin: Skin is warm and dry. He is not diaphoretic.   jaundice   Psychiatric: He has a normal mood and affect. His behavior is normal. Judgment and thought content normal. His mood appears not anxious.   Nursing note and vitals  reviewed.      Laboratory:  CBC:   Recent Labs  Lab 01/11/18  0143   WBC 2.94*   RBC 2.56*   HGB 7.8*   HCT 24.0*      MCV 94   MCH 30.5   MCHC 32.5     CMP:   Recent Labs  Lab 01/11/18 0143   GLU 80   CALCIUM 8.2*   ALBUMIN 1.8*   PROT 4.9*      K 3.7   CO2 27      BUN 4*   CREATININE 2.4*   ALKPHOS 153*   ALT <5*   AST 22   BILITOT 2.7*     Coagulation:   Recent Labs  Lab 01/11/18  0143   INR 1.2     Labs within the past 24 hours have been reviewed.    Diagnostic Results:  I have personally reviewed all pertinent imaging studies.    Assessment/Plan:     * Fever    - unclear source of fever, previously thought to be related to peritonitis  - blood cultures pending  - start broad spectrum antibiotics, consider ID consult in AM  - scheduled for outpatient paracentesis and IR drainage of fluid collection, may need thoracentesis to drain R pleural effusion and send for cell counts/culture        Nausea and vomiting    - h/o constipation, reports having regular BM's with bowel regimen  - vomiting episode x 1 at home. Anti-emetics ordered.  - KUB pending.        Peritonitis    - initially diagnosed in November but has since had improved cell counts in recent para's  - scheduled for outpatient IR fluid collection drainage and paracentesis today, may re-schedule while inpatient        Pleural effusion, right    - CXR in ER with large right pleural effusion with subjective c/o worsening SOB  - may consider thoracentesis at same time as paracentesis to send for cell counts/culture        Liver transplanted    - post op course complicated by fevers and hyperbilirubinemia  - ERCP 12/6 with post-anastomosis stricture with stent placement  - AST/ALT normal, Tbili/alkphos remain elevated  - Liver US 1/8 showed 3.9cm complex fluid collection anterior to right lobe and moderate nonspecific complex ascites inferior to transplant        Neutropenia    -  on AM labs. Infectious work up in process, CMV PCR  this AM. Continue to monitor.        Protein-calorie malnutrition    - poor PO intake since transplant requiring short course of TPN.   - albumin remains decreased. Dietary consulted.         Anemia of chronic disease    - h/h decreased but stable. Type and screen active.   - may need to transfuse with next HD        At risk for opportunistic infections    - hold valcyte as wbc low. Will check CMV PCR this AM.  - Continue Bactrim for PCP proph  - isavu for fungal prophylaxis 2/2 complicated post-op course        Long-term use of immunosuppressant medication    - Maintenance IS with cyclosporine. MMF on hold for infections and neutropenia. cont to check prograf level daily. Assess for toxicity and adjust level as needed        Acute renal failure with tubular necrosis    - HD resumed on previous admission. Now anuric and dialyzes M-W-F.  - consult nephrology in AM to manage HD while inpatient            Shereen Mcguire DNP  Liver Transplant  [facility]

## 2018-01-11 NOTE — ASSESSMENT & PLAN NOTE
- Maintenance IS with cyclosporine. MMF on hold for infections and neutropenia. cont to check prograf level daily. Assess for toxicity and adjust level as needed

## 2018-01-11 NOTE — SUBJECTIVE & OBJECTIVE
Past Medical History:   Diagnosis Date    Alcoholic hepatitis with ascites     Alcoholic hepatitis with ascites     History of hematemesis 9/28/2017    Hypertension     Renal disorder        Past Surgical History:   Procedure Laterality Date    APPENDECTOMY         Review of patient's allergies indicates:   Allergen Reactions    Bactrim [sulfamethoxazole-trimethoprim] Other (See Comments)     Mookie Trell Syndrome       Family History     None        Social History Main Topics    Smoking status: Former Smoker     Types: Cigarettes    Smokeless tobacco: Never Used    Alcohol use No      Comment: a fifth of liquor daily for years, cut back over last 2 months    Drug use: No    Sexual activity: Not on file         (Not in a hospital admission)    Review of Systems   Constitutional: Positive for activity change, appetite change and fever. Negative for chills and fatigue.   Respiratory: Negative for cough, shortness of breath and wheezing.    Cardiovascular: Positive for leg swelling. Negative for chest pain.   Gastrointestinal: Positive for abdominal distention and abdominal pain. Negative for constipation, nausea and vomiting.   Genitourinary: Negative for difficulty urinating and dysuria.        Anuric   Musculoskeletal: Negative for arthralgias, back pain and myalgias.   Skin: Positive for wound (chevron incision with wound vac). Negative for color change and rash.   Allergic/Immunologic: Positive for immunocompromised state.   Neurological: Positive for weakness. Negative for dizziness, seizures and headaches.   Psychiatric/Behavioral: Negative for confusion and decreased concentration. The patient is not nervous/anxious.      Objective:     Vital Signs (Most Recent):  Temp: 98.9 °F (37.2 °C) (01/11/18 0328)  Pulse: 93 (01/11/18 0351)  Resp: 18 (01/11/18 0351)  BP: (!) 101/59 (01/11/18 0321)  SpO2: 97 % (01/11/18 0351) Vital Signs (24h Range):  Temp:  [98.9 °F (37.2 °C)-99.3 °F (37.4 °C)] 98.9 °F (37.2  °C)  Pulse:  [] 93  Resp:  [16-22] 18  SpO2:  [96 %-98 %] 97 %  BP: (100-102)/(50-59) 101/59     Weight: 72.6 kg (160 lb)  Body mass index is 25.82 kg/m².    No intake or output data in the 24 hours ending 01/11/18 0413    Physical Exam   Constitutional: He is oriented to person, place, and time. He appears well-developed. No distress.   Temporal and distal extremity muscle wasting   HENT:   Head: Normocephalic and atraumatic.   Eyes: No scleral icterus.   Neck: Normal range of motion. Neck supple. No JVD present.   Cardiovascular: Normal rate, regular rhythm, normal heart sounds and intact distal pulses.    No murmur heard.  Pulmonary/Chest: Effort normal. No respiratory distress. He has decreased breath sounds in the right middle field, the right lower field and the left lower field. He has no wheezes. He exhibits no tenderness.   Abdominal: Soft. Bowel sounds are normal. He exhibits distension and ascites. There is tenderness (mild). There is no rebound and no guarding.   Wound vac to incision   Musculoskeletal: Normal range of motion. He exhibits edema (2+ LE edema). He exhibits no tenderness.   Neurological: He is alert and oriented to person, place, and time. He has normal reflexes.   Skin: Skin is warm and dry. He is not diaphoretic.   jaundice   Psychiatric: He has a normal mood and affect. His behavior is normal. Judgment and thought content normal. His mood appears not anxious.   Nursing note and vitals reviewed.      Laboratory:  CBC:   Recent Labs  Lab 01/11/18 0143   WBC 2.94*   RBC 2.56*   HGB 7.8*   HCT 24.0*      MCV 94   MCH 30.5   MCHC 32.5     CMP:   Recent Labs  Lab 01/11/18 0143   GLU 80   CALCIUM 8.2*   ALBUMIN 1.8*   PROT 4.9*      K 3.7   CO2 27      BUN 4*   CREATININE 2.4*   ALKPHOS 153*   ALT <5*   AST 22   BILITOT 2.7*     Coagulation:   Recent Labs  Lab 01/11/18 0143   INR 1.2     Labs within the past 24 hours have been reviewed.    Diagnostic Results:  I have  personally reviewed all pertinent imaging studies.

## 2018-01-11 NOTE — ASSESSMENT & PLAN NOTE
- CXR in ER with large right pleural effusion with subjective c/o worsening SOB  - thoracentesis completed- 1200 ml removed.  Cell count and Cx pending.

## 2018-01-11 NOTE — SUBJECTIVE & OBJECTIVE
Past Medical History:   Diagnosis Date    Alcoholic hepatitis with ascites     Alcoholic hepatitis with ascites     History of hematemesis 9/28/2017    Hypertension     Renal disorder        Past Surgical History:   Procedure Laterality Date    APPENDECTOMY         Review of patient's allergies indicates:   Allergen Reactions    Bactrim [sulfamethoxazole-trimethoprim] Other (See Comments)     Mookie Trell Syndrome       Family History     None        Social History Main Topics    Smoking status: Former Smoker     Types: Cigarettes    Smokeless tobacco: Never Used    Alcohol use No      Comment: a fifth of liquor daily for years, cut back over last 2 months    Drug use: No    Sexual activity: Not on file       PTA Medications   Medication Sig    aspirin (ECOTRIN) 81 MG EC tablet Take 1 tablet (81 mg total) by mouth once daily.    atovaquone (MEPRON) 750 mg/5 mL Susp Take 10 mLs (1,500 mg total) by mouth once daily. Stop on 4/17/18    cycloSPORINE modified, NEORAL, 25 MG capsule Take 3 capsules (75 mg total) by mouth 2 (two) times daily.    docusate sodium (COLACE) 100 MG capsule Take 100 mg by mouth 3 (three) times daily as needed for Constipation.    ergocalciferol (ERGOCALCIFEROL) 50,000 unit Cap Take 1 capsule (50,000 Units total) by mouth every 7 days.    levETIRAcetam (KEPPRA) 500 MG Tab Take 1 tablet (500 mg total) by mouth 2 (two) times daily.    levothyroxine (SYNTHROID) 75 MCG tablet Take 1 tablet (75 mcg total) by mouth before breakfast.    mirtazapine (REMERON) 7.5 MG Tab Take 1 tablet (7.5 mg total) by mouth every evening.    multivitamin (THERAGRAN) tablet Take 1 tablet by mouth once daily.    ondansetron (ZOFRAN-ODT) 8 MG TbDL Take 1 tablet (8 mg total) by mouth every 8 (eight) hours as needed (nausea).    oxyCODONE (ROXICODONE) 10 mg Tab immediate release tablet Take 0.5-1 tablets (5-10 mg total) by mouth every 6 (six) hours as needed for Pain.    pantoprazole (PROTONIX) 40 MG  tablet Take 1 tablet (40 mg total) by mouth once daily.    sodium bicarbonate 650 MG tablet Take 2 tablets (1,300 mg total) by mouth 2 (two) times daily.    thiamine 100 MG tablet Take 1 tablet (100 mg total) by mouth once daily.    ursodiol (ACTIGALL) 300 mg capsule Take 1 capsule (300 mg total) by mouth 2 (two) times daily.       Review of Systems   Constitutional: Positive for activity change, appetite change and fever. Negative for chills and fatigue.   HENT: Negative.    Respiratory: Negative for cough, shortness of breath and wheezing.    Cardiovascular: Positive for leg swelling. Negative for chest pain.   Gastrointestinal: Positive for abdominal distention and abdominal pain. Negative for constipation, nausea and vomiting.   Genitourinary: Negative for difficulty urinating and dysuria.        Anuric   Musculoskeletal: Negative for arthralgias, back pain and myalgias.   Skin: Positive for wound (chevron incision with wound vac). Negative for color change and rash.   Allergic/Immunologic: Positive for immunocompromised state.   Neurological: Positive for weakness. Negative for dizziness, seizures and headaches.   Psychiatric/Behavioral: Negative for confusion and decreased concentration. The patient is not nervous/anxious.      Objective:     Vital Signs (Most Recent):  Temp: 98.9 °F (37.2 °C) (01/11/18 0737)  Pulse: 90 (01/11/18 1451)  Resp: 17 (01/11/18 1451)  BP: (!) 116/59 (01/11/18 1451)  SpO2: 97 % (01/11/18 1451) Vital Signs (24h Range):  Temp:  [98.5 °F (36.9 °C)-99.3 °F (37.4 °C)] 98.9 °F (37.2 °C)  Pulse:  [] 90  Resp:  [12-22] 17  SpO2:  [95 %-100 %] 97 %  BP: ()/(25-61) 116/59     Weight: 72.6 kg (160 lb)  Body mass index is 25.82 kg/m².      Intake/Output Summary (Last 24 hours) at 01/11/18 1714  Last data filed at 01/11/18 1339   Gross per 24 hour   Intake                0 ml   Output             2800 ml   Net            -2800 ml       Physical Exam   Constitutional: He is oriented  to person, place, and time. He appears well-developed. No distress.   Temporal and distal extremity muscle wasting   HENT:   Head: Normocephalic and atraumatic.   Mouth/Throat: No oropharyngeal exudate.   Eyes: EOM are normal. Pupils are equal, round, and reactive to light. Scleral icterus is present.   Neck: Normal range of motion. Neck supple. No JVD present. No thyromegaly present.   Cardiovascular: Normal rate, regular rhythm, normal heart sounds and intact distal pulses.    No murmur heard.  Pulmonary/Chest: Effort normal. No respiratory distress. He has decreased breath sounds in the right middle field, the right lower field and the left lower field. He has no wheezes. He exhibits no tenderness.   Diminished to RLL   Abdominal: Soft. Bowel sounds are normal. He exhibits distension and ascites. There is tenderness (mild). There is no rebound and no guarding.   Wound vac to incision   Musculoskeletal: Normal range of motion. He exhibits edema (2+ LE edema). He exhibits no tenderness.   Neurological: He is alert and oriented to person, place, and time. He has normal reflexes.   Skin: Skin is warm and dry. Capillary refill takes 2 to 3 seconds. He is not diaphoretic. No erythema.   jaundice   Psychiatric: He has a normal mood and affect. His behavior is normal. Judgment and thought content normal. His mood appears not anxious.   Nursing note and vitals reviewed.      Laboratory:  CBC:     Recent Labs  Lab 01/11/18 0434   WBC 2.81*   RBC 2.48*   HGB 7.6*   HCT 23.9*      MCV 96   MCH 30.6   MCHC 31.8*     CMP:     Recent Labs  Lab 01/11/18 0434   GLU 89   CALCIUM 8.1*   ALBUMIN 1.8*   PROT 4.7*      K 3.1*   CO2 28      BUN 5*   CREATININE 2.4*   ALKPHOS 144*   ALT <5*   AST 21   BILITOT 2.6*     Coagulation:     Recent Labs  Lab 01/11/18 0434   INR 1.2     Cyclosporine, LC/MS   Date Value Ref Range Status   01/11/2018 223 100 - 400 ng/mL Final     Comment:     Reference Normals:  For Kidney  Transplants: 100-300 ng/mL  Testing performed by Liquid Chromatography-Tandem  Mass Spectrometry (LC-MS/MS).  This test was developed and its performance characteristics  determined by [facility], Department of Pathology  and Laboratory Medicine in a manner consistent with CLIA  requirements. It has not been cleared or approved by the [place]  Food and Drug Administration.  This test is used for clinical  purposes.  It should not be regarded as investigational or for  research.     01/08/2018 238 100 - 400 ng/mL Final     Comment:     Reference Normals:  For Kidney Transplants: 100-300 ng/mL  Testing performed by Liquid Chromatography-Tandem  Mass Spectrometry (LC-MS/MS).  This test was developed and its performance characteristics  determined by [facility], Department of Pathology  and Laboratory Medicine in a manner consistent with CLIA  requirements. It has not been cleared or approved by the [place]  Food and Drug Administration.  This test is used for clinical  purposes.  It should not be regarded as investigational or for  research.     01/05/2018 296 100 - 400 ng/mL Final     Comment:     Reference Normals:  For Kidney Transplants: 100-300 ng/mL  Testing performed by Liquid Chromatography-Tandem  Mass Spectrometry (LC-MS/MS).  This test was developed and its performance characteristics  determined by [facility], Department of Pathology  and Laboratory Medicine in a manner consistent with CLIA  requirements. It has not been cleared or approved by the [place]  Food and Drug Administration.  This test is used for clinical  purposes.  It should not be regarded as investigational or for  research.     01/02/2018 409 (HH) 100 - 400 ng/mL Final     Comment:     Reference Normals:  For Kidney Transplants: 100-300 ng/mL  Testing performed by Liquid Chromatography-Tandem  Mass Spectrometry (LC-MS/MS).  This test was developed and its performance characteristics  determined by [facility], Department of Pathology  and Laboratory Medicine in a manner consistent with CLIA  requirements. It has not been cleared or approved by the [place]  Food and Drug Administration.  This test is used for clinical  purposes.  It should not be regarded as investigational or for  research.  Cyclosporine critical result(s) called and verbal readback obtained   from Margie Kathleen RN., 01/02/2018 13:39         Labs within the past 24 hours have been reviewed.    Diagnostic Results:  I have personally reviewed all pertinent imaging studies.

## 2018-01-11 NOTE — ASSESSMENT & PLAN NOTE
- h/o constipation, reports having regular BM's with bowel regimen  - vomiting episode x 1 at home. Anti-emetics ordered.  - KUB reviewed.

## 2018-01-11 NOTE — CONSULTS
[facility]  Infectious Disease  Consult Note    Patient Name: Jhonny Diana  MRN: [id number]  Admission Date: 1/11/2018  Hospital Length of Stay: 0 days  Attending Physician: Nathanael Medina MD  Primary Care Provider: Primary Doctor No     Isolation Status: No active isolations    Patient information was obtained from past medical records and ER records.      Inpatient consult to Infectious Diseases  Consult performed by: NAEEM NATH  Consult ordered by: JOSÉ MIGUEL DURAN        Assessment/Plan:     * Fever    28 y/o male with hx of asthma and alcoholic cirrhosis s/p DDLT 10/19/2017 (c/b HE, EV, portal HTN, and HRS on HD; CMV D+/R+, steroid induction, on maintenance cyclosporine/MMF; c/b seizures 2/2 to tacro, [facility], multiple episodes of peritonitis (most recently in 12/2017 suspected 2/2 to biliary leakage, found to have biliary stricture on ERCP 12/6 s/p sphincterotomy/biliary stent placement in CBD and abdominal drain placement 12/13/2017 s/p treatment with flagyl/cipro, s/p multiple paracenteses as well as abscess drainage of perihepatic fluid collections and treated with antimicrobial therapy but no positive cultures ) admitted 1/11/18 for 1 day hx of objective fever of 103, nausea, 1 episode of green emesis and L sided abdo pain. Pt had planned para/abscess drainage scheduled for 1/11 for a 3.9 complex fluid collection anterior to R lobe found on liver U/S on 1/4. Workup revealing R pleural effusion on CXR. Blood cultures in process. Currently on broad spectrum abx (cefepime and vanc).    Patient scheduled for IR guided para, thora, and abscess drainage today.     Recommendations:  -please send fluid samples off for AFB stain, fungal/bacterial cultures and gram stain, glucose, WBC, and proteins, and LDH.   -will order viral PCR and respiratory culture/gram stain  -would continue with vanco (given hx of SSTI) and empiric cefepime until cultures  result              Thank you for your consult. I will follow-up with patient. Please contact us if you have any additional questions.    TED Tuttel  Infectious Disease  [facility]    Subjective:     Principal Problem: Fever    HPI: 28 y/o male with hx of asthma and alcoholic cirrhosis s/p DDLT 10/19/2017 (c/b HE, EV, portal HTN, and HRS on HD; CMV D+/R+, steroid induction, on maintenance cyclosporine/MMF; c/b seizures 2/2 to tacro, superificial wound infection s/p wound vac to chevron, multiple episodes of peritonitis (most recently in 12/2017 suspected 2/2 to biliary leakage, found to have biliary stricture on ERCP 12/6 s/p sphincterotomy/biliary stent placement in CBD and abdominal drain placement 12/13/2017 s/p treatment with flagyl/cipro, s/p multiple paracenteses as well as abscess drainage of perihepatic fluid collections and treated with antimicrobial therapy but no positive cultures ) admitted 1/11/18 for 1 day hx of objective fever of 103, nausea, 1 episode of green emesis and L sided abdo pain. Associated sx including SOB. Denies CP, palpitations, diarrhea, constipation, or back pain. Denies any sick contacts. Transplant ID consulted for possible infection.           Past Medical History:   Diagnosis Date    Alcoholic hepatitis with ascites     Alcoholic hepatitis with ascites     History of hematemesis 9/28/2017    Hypertension     Renal disorder        Past Surgical History:   Procedure Laterality Date    APPENDECTOMY         Review of patient's allergies indicates:   Allergen Reactions    Bactrim [sulfamethoxazole-trimethoprim] Other (See Comments)     Mookie Trell Syndrome       Medications:  Prescriptions Prior to Admission   Medication Sig    aspirin (ECOTRIN) 81 MG EC tablet Take 1 tablet (81 mg total) by mouth once daily.    atovaquone (MEPRON) 750 mg/5 mL Susp Take 10 mLs (1,500 mg total) by mouth once daily. Stop on 4/17/18    cycloSPORINE modified,  NEORAL, 25 MG capsule Take 3 capsules (75 mg total) by mouth 2 (two) times daily.    docusate sodium (COLACE) 100 MG capsule Take 100 mg by mouth 3 (three) times daily as needed for Constipation.    ergocalciferol (ERGOCALCIFEROL) 50,000 unit Cap Take 1 capsule (50,000 Units total) by mouth every 7 days.    levETIRAcetam (KEPPRA) 500 MG Tab Take 1 tablet (500 mg total) by mouth 2 (two) times daily.    levothyroxine (SYNTHROID) 75 MCG tablet Take 1 tablet (75 mcg total) by mouth before breakfast.    mirtazapine (REMERON) 7.5 MG Tab Take 1 tablet (7.5 mg total) by mouth every evening.    multivitamin (THERAGRAN) tablet Take 1 tablet by mouth once daily.    ondansetron (ZOFRAN-ODT) 8 MG TbDL Take 1 tablet (8 mg total) by mouth every 8 (eight) hours as needed (nausea).    oxyCODONE (ROXICODONE) 10 mg Tab immediate release tablet Take 0.5-1 tablets (5-10 mg total) by mouth every 6 (six) hours as needed for Pain.    pantoprazole (PROTONIX) 40 MG tablet Take 1 tablet (40 mg total) by mouth once daily.    sodium bicarbonate 650 MG tablet Take 2 tablets (1,300 mg total) by mouth 2 (two) times daily.    thiamine 100 MG tablet Take 1 tablet (100 mg total) by mouth once daily.    ursodiol (ACTIGALL) 300 mg capsule Take 1 capsule (300 mg total) by mouth 2 (two) times daily.     Antibiotics     Start     Stop Route Frequency Ordered    01/11/18 1400  cefepime 1g in dextrose 5% 50 mL IVPB (ready to mix system)      -- IV Every 12 hours (non-standard times) 01/11/18 0924        Antifungals     None        Antivirals     None           Immunization History   Administered Date(s) Administered    Hepatitis A, Pediatric/Adolescent, 2 Dose 10/11/2017    Hepatitis B, Adult 10/12/2017    Influenza - High Dose 10/12/2017    Pneumococcal Conjugate - 13 Valent 10/12/2017    Tdap 10/11/2017       Family History     None        Social History     Social History    Marital status: Single     Spouse name: N/A    Number of  children: N/A    Years of education: N/A     Social History Main Topics    Smoking status: Former Smoker     Types: Cigarettes    Smokeless tobacco: Never Used    Alcohol use No      Comment: a fifth of liquor daily for years, cut back over last 2 months    Drug use: No    Sexual activity: Not Asked     Other Topics Concern    None     Social History Narrative    None     Review of Systems     Constitutional: Positive for fever.   Cardiovascular: Positive for leg swelling.   Gastrointestinal: Positive for abdominal distention, abdominal pain and vomiting. Negative for diarrhea and nausea.   Genitourinary:        Anuric     Allergic/Immunologic: Positive for immunocompromised state.   Neurological: Positive for weakness.          Objective:     Vital Signs (Most Recent):  Temp: 98.9 °F (37.2 °C) (01/11/18 0737)  Pulse: 90 (01/11/18 1451)  Resp: 17 (01/11/18 1451)  BP: (!) 116/59 (01/11/18 1451)  SpO2: 97 % (01/11/18 1451) Vital Signs (24h Range):  Temp:  [98.5 °F (36.9 °C)-99.3 °F (37.4 °C)] 98.9 °F (37.2 °C)  Pulse:  [] 90  Resp:  [12-22] 17  SpO2:  [95 %-100 %] 97 %  BP: ()/(25-61) 116/59     Weight: 72.6 kg (160 lb)  Body mass index is 25.82 kg/m².    Estimated Creatinine Clearance: 41.4 mL/min (based on SCr of 2.4 mg/dL (H)).    Physical Exam   Nursing note and vitals reviewed.  Gen: thin male in no distress  CVS: RRR, S1/S2   Pulm: decreased breath sounds  Abdo: chevron wound vac in place, distended abdo, hypoactive bowel sounds  Ext: 2+ pitting edema in LE  Neuro: sleepy on exam (returned from procedure), follows commands    Significant Labs:   Blood Culture:   Recent Labs  Lab 12/03/17  0119 12/09/17  0830 12/20/17  0934 01/11/18  0159 01/11/18  0205   LABBLOO No growth after 5 days. No growth after 5 days.  No growth after 5 days. No growth after 5 days.  No growth after 5 days. No Growth to date No Growth to date     CBC:   Recent Labs  Lab 01/11/18  0143 01/11/18  0434   WBC 2.94*  2.81*   HGB 7.8* 7.6*   HCT 24.0* 23.9*    205     CMP:   Recent Labs  Lab 01/11/18  0143 01/11/18  0434    137   K 3.7 3.1*    102   CO2 27 28   GLU 80 89   BUN 4* 5*   CREATININE 2.4* 2.4*   CALCIUM 8.2* 8.1*   PROT 4.9* 4.7*   ALBUMIN 1.8* 1.8*   BILITOT 2.7* 2.6*   ALKPHOS 153* 144*   AST 22 21   ALT <5* <5*   ANIONGAP 10 7*   EGFRNONAA 35.4* 35.4*     Respiratory Culture:   Recent Labs  Lab 10/06/17  1921   GSRESP <10 epithelial cells per low power field  No WBC's  Few Gram negative rods   RESPIRATORYC KLEBSIELLA PNEUMONIAE ESBLMany     Wound Culture:   Recent Labs  Lab 11/29/17  1656 12/05/17  1657 12/11/17  1447 12/13/17  0834 12/18/17  1118   LABAERO No growth No growth No growth No growth No growth       Significant Imaging: I have reviewed all pertinent imaging results/findings within the past 24 hours.

## 2018-01-11 NOTE — ASSESSMENT & PLAN NOTE
- post op course complicated by fevers and hyperbilirubinemia  - ERCP 12/6 with post-anastomosis stricture with stent placement  - AST/ALT normal. Tbili/alkphos remain elevated  - Liver US 1/8 showed 3.9cm complex fluid collection anterior to right lobe and moderate nonspecific complex ascites inferior to transplant.   - IR placed drain today, cell count and cultures pending.

## 2018-01-11 NOTE — ED TRIAGE NOTES
Fever started lastnight. Abdominal pain (RUQ to epigastric area)7/10.  + nausea and vomiting. +stabbing chestpain. Pt scheduled for paracentesis today.  Last dialysis wednesday    LOC: The patient is awake, alert, aware of environment with an appropriate affect. Oriented x4, speaking appropriately  APPEARANCE: Pt resting comfortably, in no acute distress, pt is clean and well groomed, clothing properly fastened  SKIN:The skin is warm and dry, pale and yellowish sclera.  RESPIRATORY:Airway is open and patent, respirations are spontaneous, patient has a normal effort and rate, no accessory muscle use noted.  CARDIAC: Normal rate and rhythm, peripheral edema noted, capillary refill < 3 seconds, bilateral radial pulses 2+.  ABDOMEN: tender and distended.  NEUROLOGIC: PERRLA, facial expression is symmetrical, patient moving all extremities spontaneously, normal sensation in all extremities when touched with a finger.  Follows all commands appropriately  MUSCULOSKELETAL: Patient moving all extremities spontaneously,  obvious swelling  noted.

## 2018-01-11 NOTE — TELEPHONE ENCOUNTER
Pt is a liver transplant from 10/19/2017, Temp was 101.9 about 30 min ago and has been vomiting. Gave tylenol before call mom gave 650mg. Residing in appt near hospital. Has been having pain toward ribs and vomited once. Gave zofran at 830 pm. Advised to ED at this time per protocol. Mom states she spoke with coordinator earlier today and was told not to go to   ED due to all the flu cases. Dr. Nathanael Medina paged and connected per . MD notified of pt's current status. MD advised to ED at this time. Mom given advise to go to ED per Dr. Medina. Verbalized understanding.    Reason for Disposition   Patient sounds very sick or weak to the triager  (Exception: mild weakness and hasn't taken fever medicine)    Protocols used: ST FEVER-A-AH

## 2018-01-11 NOTE — ASSESSMENT & PLAN NOTE
- poor PO intake since transplant requiring short course of TPN during previous hospital stay.   - albumin remains decreased. Dietary consulted.

## 2018-01-11 NOTE — ASSESSMENT & PLAN NOTE
- HD resumed on previous admission. Now anuric and dialyzes M-W-F.  - consult nephrology in AM to manage HD while inpatient

## 2018-01-11 NOTE — HPI
Jhonny Diana is a 28 y/o  male with PMHx relevant for ETOH cirrhosis.  S/p DDLT 10/19/2017; c/b seizures (swtiched off prograf to cyclo), [facility]N dialysis dependant in iHD MWF, last HD on 1/10, anuric, superficial wound infection s/p wound vac to chevron incision, and multiple admissions for fevers finally dx with peritonitis in November (WBC 5000, 75% PNM) neg for bile leak. HE gets recurrent paracentesis and has peritoneal drain. Other pertinent PMH current wound vac in place 2/2 wound infection, malnutrition requiring TPN for short course and ongoing hypoalbuminemia, and seizure activity while on prograf and has since been switched to cyclosporine without reoccurrence.  He is admitted to LTx service secondary to fever, abdominal pain, and N/V. He reports fever (103) for 1 day prior. Overnight, he developed N/V, reports small amount of green emesis with new left sided pain. He also endorses worsening SOB with exertion. He was scheduled as an outpatient for follow up paracentesis and IR drainage of fluid collection. CXR in ER shows large pleural effusion with subsegmental atelectasis. His ANC is 900. Broad spectrum antibiotics initiated in ED. Nephrology consulted for LAURA dialysis dependant co management. He dialyses at [facility] under the care of Dr. Peterson via [facility] rowe.

## 2018-01-11 NOTE — ASSESSMENT & PLAN NOTE
- hold valcyte as wbc low. Will check CMV PCR this AM.  - Continue Bactrim for PCP proph  - isavu for fungal prophylaxis 2/2 complicated post-op course

## 2018-01-11 NOTE — ASSESSMENT & PLAN NOTE
- h/h decreased but stable. Type and screen active. No acute signs of blood loss.   - may need to transfuse with next HD

## 2018-01-11 NOTE — ASSESSMENT & PLAN NOTE
- unclear source of fever, previously thought to be related to peritonitis  - blood cultures pending  - start broad spectrum antibiotics, consider ID consult in AM  - scheduled for outpatient paracentesis and IR drainage of fluid collection, may need thoracentesis to drain R pleural effusion and send for cell counts/culture

## 2018-01-11 NOTE — H&P
Inpatient Radiology Pre-procedure Note    History of Present Illness:    Jhonny Diana is a 27 y.o. male status post liver transplantation who presents for right thoracentesis, paracentesis and elton-hepatic fluid collection drainage with possible drain placement.    Admission H&P reviewed.    Past Medical History:   Diagnosis Date    Alcoholic hepatitis with ascites     Alcoholic hepatitis with ascites     History of hematemesis 9/28/2017    Hypertension     Renal disorder      Past Surgical History:   Procedure Laterality Date    APPENDECTOMY         Review of Systems:   As documented in primary team H&P    Home Meds:   Prior to Admission medications    Medication Sig Start Date End Date Taking? Authorizing Provider   aspirin (ECOTRIN) 81 MG EC tablet Take 1 tablet (81 mg total) by mouth once daily. 11/8/17   Nathanael Medina MD   atovaquone (MEPRON) 750 mg/5 mL Susp Take 10 mLs (1,500 mg total) by mouth once daily. Stop on 4/17/18 10/19/17 4/17/18  Nathanael Medina MD   cycloSPORINE modified, NEORAL, 25 MG capsule Take 3 capsules (75 mg total) by mouth 2 (two) times daily. 1/9/18   Quiana Carter MD   docusate sodium (COLACE) 100 MG capsule Take 100 mg by mouth 3 (three) times daily as needed for Constipation.    Historical Provider, MD   ergocalciferol (ERGOCALCIFEROL) 50,000 unit Cap Take 1 capsule (50,000 Units total) by mouth every 7 days. 11/7/17   Nathanael Medina MD   levETIRAcetam (KEPPRA) 500 MG Tab Take 1 tablet (500 mg total) by mouth 2 (two) times daily. 11/7/17 11/7/18  Nathanael Medina MD   levothyroxine (SYNTHROID) 75 MCG tablet Take 1 tablet (75 mcg total) by mouth before breakfast. 12/21/17   Nathanael Medina MD   mirtazapine (REMERON) 7.5 MG Tab Take 1 tablet (7.5 mg total) by mouth every evening. 12/20/17   Nathanael Medina MD   multivitamin (THERAGRAN) tablet Take 1 tablet by mouth once daily. 11/7/17   Nathanael Medina MD   ondansetron (ZOFRAN-ODT) 8 MG TbDL Take 1 tablet (8 mg total) by mouth every 8 (eight)  hours as needed (nausea). 1/10/18   Quiana Carter MD   oxyCODONE (ROXICODONE) 10 mg Tab immediate release tablet Take 0.5-1 tablets (5-10 mg total) by mouth every 6 (six) hours as needed for Pain. 1/10/18   Quiana Carter MD   pantoprazole (PROTONIX) 40 MG tablet Take 1 tablet (40 mg total) by mouth once daily. 11/8/17   Nathanael Medina MD   sodium bicarbonate 650 MG tablet Take 2 tablets (1,300 mg total) by mouth 2 (two) times daily. 11/30/17   Geronimo Vera PA-C   thiamine 100 MG tablet Take 1 tablet (100 mg total) by mouth once daily. 11/7/17   Nathanael Medina MD   ursodiol (ACTIGALL) 300 mg capsule Take 1 capsule (300 mg total) by mouth 2 (two) times daily. 12/20/17   Nathanael Medina MD     Scheduled Meds:    atovaquone  1,500 mg Oral Daily    ceFEPime (MAXIPIME) IVPB  1 g Intravenous Q12H    cycloSPORINE modified (NEORAL)  75 mg Oral BID    levETIRAcetam  500 mg Oral BID    levothyroxine  75 mcg Oral Before breakfast    multivitamin  1 tablet Oral Daily    pantoprazole  40 mg Oral Daily    thiamine  100 mg Oral Daily    ursodiol  300 mg Oral BID     Continuous Infusions:      PRN Meds:acetaminophen, docusate sodium, HYDROmorphone, ondansetron, ondansetron, oxyCODONE, oxyCODONE, prochlorperazine, sodium chloride 0.9%  Anticoagulants/Antiplatelets: no anticoagulation    Allergies:   Review of patient's allergies indicates:   Allergen Reactions    Bactrim [sulfamethoxazole-trimethoprim] Other (See Comments)     Mookie Trell Syndrome     Sedation Hx: have not been any systemic reactions    Labs:    Recent Labs  Lab 01/11/18  0434   INR 1.2       Recent Labs  Lab 01/11/18 0434   WBC 2.81*   HGB 7.6*   HCT 23.9*   MCV 96         Recent Labs  Lab 01/05/18  0805  01/11/18  0434   GLU 78  < > 89     < > 137   K 4.5  < > 3.1*     < > 102   CO2 25  < > 28   BUN 11  < > 5*   CREATININE 4.2*  < > 2.4*   CALCIUM 8.7  < > 8.1*   MG  --   --  1.7   ALT <5*  < > <5*   AST 35  < > 21   ALBUMIN  1.9*  < > 1.8*   BILITOT 3.2*  < > 2.6*   BILIDIR 2.2*  --   --    < > = values in this interval not displayed.      Vitals:  Temp: 98.9 °F (37.2 °C) (01/11/18 0737)  Pulse: 82 (01/11/18 0730)  Resp: 18 (01/11/18 0730)  BP: (!) 104/55 (01/11/18 0730)  SpO2: 97 % (01/11/18 0730)     Physical Exam:  ASA: 3  Mallampati: 2    General: no acute distress  Mental Status: alert and oriented to person, place and time  HEENT: normocephalic, atraumatic  Chest: unlabored breathing  Heart: regular heart rate  Abdomen: nondistended  Extremity: moves all extremities    Plan: Right thoracentesis, paracentesis and drainage of a elton-hepatic fluid collection with possible tube placement.  Sedation Plan: local + versed    Devante Marroquin MD  PGY-5  Department of Radiology  [phone]

## 2018-01-11 NOTE — ASSESSMENT & PLAN NOTE
- initially diagnosed in November but has since had improved cell counts in recent para's  - scheduled for outpatient IR fluid collection drainage and paracentesis today, may re-schedule while inpatient

## 2018-01-11 NOTE — PROCEDURES
Radiology Post-Procedure Note    Pre Op Diagnosis: Liver transplanted, ascites, pleural effusion, elton-hepatic fluid collection  Post Op Diagnosis: Same    Procedure: right thoracentesis, paracentesis and drainage of a elton-hepatic fluid collection with drain placement    Procedure performed by: Romulo Abrams MD and Devante Marroquin MD    Written Informed Consent Obtained: Yes  Specimen Removed: YES pleural fluid, ascites and reddish yellow fluid  Estimated Blood Loss: Minimal    Findings:   Successful right thoracentesis, paracentesis and elton-hepatic loculated fluid collection drainage with placement of an 8Fr all-purpose drain. Samples sent to the lab. Please see separate imaging dictations for further details.    Patient tolerated the procedures well.    Devante Marroquin MD  PGY-5  Department of Radiology  [phone]

## 2018-01-11 NOTE — ASSESSMENT & PLAN NOTE
- initially diagnosed in November but has since had improved cell counts in recent para's  - IR placed drain in fluid collection and paracentesis today- count spending.  They removed 1600ml w para.

## 2018-01-11 NOTE — PROGRESS NOTES
[facility]  Liver Transplant  Progress Note    Patient Name: Jhonny Diana  MRN: [id number]  Admission Date: 2018  Hospital Length of Stay: 0 days  Code Status: Full Code  Primary Care Provider: Primary Doctor No  Post-Operative Day: 84    ORGAN:   LIVER  Disease Etiology: Acute Alcoholic Hepatitis  Donor Type:    - Brain Death  CDC High Risk:   No  Donor CMV Status:   Donor CMV Status: Positive  Donor HBcAB:   Negative  Donor HCV Status:   Negative  Whole or Partial: Whole Liver  Biliary Anastomosis: End to End  Arterial Anatomy: Standard  Subjective:     History of Present Illness:  Jhonny Diana is a 28 y/o male with past medical history of alcoholic cirrhosis.  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 Found to have peritonitis in November (WBC 5000, 75% PNM) neg for bile leak. He was treated initially with vanc/cefepime. Repeat cell counts  with some improvement (WBC 1400, 45% PNM). He has undergone multiple paracenteses as well as abscess drainage of perihepatic fluid collections and treated with antimicrobial therapy but no positive cultures. Of note, biliary stricture also identified and ERCP performed on 2017 with sphincterotomy and biliary stent placed. Liver tests still have not normalized despite intervention, bilirubin and AP remain elevated. Other pertinent PMH current wound vac in place 2/2 wound infection, malnutrition requiring TPN for short course and ongoing hypoalbuminemia, and seizure activity while on prograf and has since been switched to cyclosporine without reoccurrence.  He presented to the ER for fever, abdominal pain, and N/V. He reports fever (103) for 1 day  prior. Overnight, he developed N/V, reports small amount of green emesis with new left sided pain. He also endorses worsening SOB with exertion. He was scheduled as an outpatient for follow up paracentesis and IR drainage of fluid collection. CXR in ER shows large pleural effusion with subsegmental atelectasis. Infectious work up initiated in ER. His ANC is 900. Broad spectrum antibiotics initiated in ED. He denies chest pain, palpitations, diarrhea, constipation, or back pain. Denies any sick contacts.    Hospital Course:  Interval History: no acute events since admit.  T max 99.3.  Cont BS abx- vanc and cefepime.  ID consulted- Appre recs.  Pt scheduled for thora of right pleural effsion, paracentesis and drainage of fluid collection noted on US.  He denies nausea currently.  Reports episode of vomiting 3 hours after HD yesterday.  Emesis described as phlegm and green drainage.  He tolerated full duration of HD.  Nephrology consulted- apprec recs.  ANC 84- today.  CMV pending.  Monitor.    Past Medical History:   Diagnosis Date    Alcoholic hepatitis with ascites     Alcoholic hepatitis with ascites     History of hematemesis 9/28/2017    Hypertension     Renal disorder        Past Surgical History:   Procedure Laterality Date    APPENDECTOMY         Review of patient's allergies indicates:   Allergen Reactions    Bactrim [sulfamethoxazole-trimethoprim] Other (See Comments)     Mookie Trell Syndrome       Family History     None        Social History Main Topics    Smoking status: Former Smoker     Types: Cigarettes    Smokeless tobacco: Never Used    Alcohol use No      Comment: a fifth of liquor daily for years, cut back over last 2 months    Drug use: No    Sexual activity: Not on file       PTA Medications   Medication Sig    aspirin (ECOTRIN) 81 MG EC tablet Take 1 tablet (81 mg total) by mouth once daily.    atovaquone (MEPRON) 750 mg/5 mL Susp Take 10 mLs (1,500 mg total) by mouth once  daily. Stop on 4/17/18    cycloSPORINE modified, NEORAL, 25 MG capsule Take 3 capsules (75 mg total) by mouth 2 (two) times daily.    docusate sodium (COLACE) 100 MG capsule Take 100 mg by mouth 3 (three) times daily as needed for Constipation.    ergocalciferol (ERGOCALCIFEROL) 50,000 unit Cap Take 1 capsule (50,000 Units total) by mouth every 7 days.    levETIRAcetam (KEPPRA) 500 MG Tab Take 1 tablet (500 mg total) by mouth 2 (two) times daily.    levothyroxine (SYNTHROID) 75 MCG tablet Take 1 tablet (75 mcg total) by mouth before breakfast.    mirtazapine (REMERON) 7.5 MG Tab Take 1 tablet (7.5 mg total) by mouth every evening.    multivitamin (THERAGRAN) tablet Take 1 tablet by mouth once daily.    ondansetron (ZOFRAN-ODT) 8 MG TbDL Take 1 tablet (8 mg total) by mouth every 8 (eight) hours as needed (nausea).    oxyCODONE (ROXICODONE) 10 mg Tab immediate release tablet Take 0.5-1 tablets (5-10 mg total) by mouth every 6 (six) hours as needed for Pain.    pantoprazole (PROTONIX) 40 MG tablet Take 1 tablet (40 mg total) by mouth once daily.    sodium bicarbonate 650 MG tablet Take 2 tablets (1,300 mg total) by mouth 2 (two) times daily.    thiamine 100 MG tablet Take 1 tablet (100 mg total) by mouth once daily.    ursodiol (ACTIGALL) 300 mg capsule Take 1 capsule (300 mg total) by mouth 2 (two) times daily.       Review of Systems   Constitutional: Positive for activity change, appetite change and fever. Negative for chills and fatigue.   HENT: Negative.    Respiratory: Negative for cough, shortness of breath and wheezing.    Cardiovascular: Positive for leg swelling. Negative for chest pain.   Gastrointestinal: Positive for abdominal distention and abdominal pain. Negative for constipation, nausea and vomiting.   Genitourinary: Negative for difficulty urinating and dysuria.        Anuric   Musculoskeletal: Negative for arthralgias, back pain and myalgias.   Skin: Positive for wound (chevron incision  with wound vac). Negative for color change and rash.   Allergic/Immunologic: Positive for immunocompromised state.   Neurological: Positive for weakness. Negative for dizziness, seizures and headaches.   Psychiatric/Behavioral: Negative for confusion and decreased concentration. The patient is not nervous/anxious.      Objective:     Vital Signs (Most Recent):  Temp: 98.9 °F (37.2 °C) (01/11/18 0737)  Pulse: 90 (01/11/18 1451)  Resp: 17 (01/11/18 1451)  BP: (!) 116/59 (01/11/18 1451)  SpO2: 97 % (01/11/18 1451) Vital Signs (24h Range):  Temp:  [98.5 °F (36.9 °C)-99.3 °F (37.4 °C)] 98.9 °F (37.2 °C)  Pulse:  [] 90  Resp:  [12-22] 17  SpO2:  [95 %-100 %] 97 %  BP: ()/(25-61) 116/59     Weight: 72.6 kg (160 lb)  Body mass index is 25.82 kg/m².      Intake/Output Summary (Last 24 hours) at 01/11/18 1714  Last data filed at 01/11/18 1339   Gross per 24 hour   Intake                0 ml   Output             2800 ml   Net            -2800 ml       Physical Exam   Constitutional: He is oriented to person, place, and time. He appears well-developed. No distress.   Temporal and distal extremity muscle wasting   HENT:   Head: Normocephalic and atraumatic.   Mouth/Throat: No oropharyngeal exudate.   Eyes: EOM are normal. Pupils are equal, round, and reactive to light. Scleral icterus is present.   Neck: Normal range of motion. Neck supple. No JVD present. No thyromegaly present.   Cardiovascular: Normal rate, regular rhythm, normal heart sounds and intact distal pulses.    No murmur heard.  Pulmonary/Chest: Effort normal. No respiratory distress. He has decreased breath sounds in the right middle field, the right lower field and the left lower field. He has no wheezes. He exhibits no tenderness.   Diminished to RLL   Abdominal: Soft. Bowel sounds are normal. He exhibits distension and ascites. There is tenderness (mild). There is no rebound and no guarding.   Wound vac to incision   Musculoskeletal: Normal range of  motion. He exhibits edema (2+ LE edema). He exhibits no tenderness.   Neurological: He is alert and oriented to person, place, and time. He has normal reflexes.   Skin: Skin is warm and dry. Capillary refill takes 2 to 3 seconds. He is not diaphoretic. No erythema.   jaundice   Psychiatric: He has a normal mood and affect. His behavior is normal. Judgment and thought content normal. His mood appears not anxious.   Nursing note and vitals reviewed.      Laboratory:  CBC:     Recent Labs  Lab 01/11/18 0434   WBC 2.81*   RBC 2.48*   HGB 7.6*   HCT 23.9*      MCV 96   MCH 30.6   MCHC 31.8*     CMP:     Recent Labs  Lab 01/11/18 0434   GLU 89   CALCIUM 8.1*   ALBUMIN 1.8*   PROT 4.7*      K 3.1*   CO2 28      BUN 5*   CREATININE 2.4*   ALKPHOS 144*   ALT <5*   AST 21   BILITOT 2.6*     Coagulation:     Recent Labs  Lab 01/11/18 0434   INR 1.2     Cyclosporine, LC/MS   Date Value Ref Range Status   01/11/2018 223 100 - 400 ng/mL Final     Comment:     Reference Normals:  For Kidney Transplants: 100-300 ng/mL  Testing performed by Liquid Chromatography-Tandem  Mass Spectrometry (LC-MS/MS).  This test was developed and its performance characteristics  determined by [facility], Department of Pathology  and Laboratory Medicine in a manner consistent with CLIA  requirements. It has not been cleared or approved by the [place]  Food and Drug Administration.  This test is used for clinical  purposes.  It should not be regarded as investigational or for  research.     01/08/2018 238 100 - 400 ng/mL Final     Comment:     Reference Normals:  For Kidney Transplants: 100-300 ng/mL  Testing performed by Liquid Chromatography-Tandem  Mass Spectrometry (LC-MS/MS).  This test was developed and its performance characteristics  determined by [facility], Department of Pathology  and Laboratory Medicine in a manner consistent with CLIA  requirements. It has not been cleared or approved by the [place]  Food  and Drug Administration.  This test is used for clinical  purposes.  It should not be regarded as investigational or for  research.     01/05/2018 296 100 - 400 ng/mL Final     Comment:     Reference Normals:  For Kidney Transplants: 100-300 ng/mL  Testing performed by Liquid Chromatography-Tandem  Mass Spectrometry (LC-MS/MS).  This test was developed and its performance characteristics  determined by [facility], Department of Pathology  and Laboratory Medicine in a manner consistent with CLIA  requirements. It has not been cleared or approved by the [place]  Food and Drug Administration.  This test is used for clinical  purposes.  It should not be regarded as investigational or for  research.     01/02/2018 409 (HH) 100 - 400 ng/mL Final     Comment:     Reference Normals:  For Kidney Transplants: 100-300 ng/mL  Testing performed by Liquid Chromatography-Tandem  Mass Spectrometry (LC-MS/MS).  This test was developed and its performance characteristics  determined by [facility], Department of Pathology  and Laboratory Medicine in a manner consistent with CLIA  requirements. It has not been cleared or approved by the [place]  Food and Drug Administration.  This test is used for clinical  purposes.  It should not be regarded as investigational or for  research.  Cyclosporine critical result(s) called and verbal readback obtained   from Margie Kathleen RN., 01/02/2018 13:39         Labs within the past 24 hours have been reviewed.    Diagnostic Results:  I have personally reviewed all pertinent imaging studies.    Assessment/Plan:     * Fever    - unclear source of fever, previously thought to be related to peritonitis  - blood cultures prelim NGTD.  Urine cx pending  - Cont broad spectrum antibiotics.  ID consulted, ok to see in AM- cell counts from para and IR drainage pending.          Liver transplanted    - post op course complicated by fevers and hyperbilirubinemia  - ERCP 12/6 with post-anastomosis  stricture with stent placement  - AST/ALT normal. Tbili/alkphos remain elevated  - Liver US 1/8 showed 3.9cm complex fluid collection anterior to right lobe and moderate nonspecific complex ascites inferior to transplant.   - IR placed drain today, cell count and cultures pending.        Long-term use of immunosuppressant medication    - Maintenance IS with cyclosporine. MMF on hold for infections and neutropenia. cont to check prograf level daily. Assess for toxicity and adjust level as needed        At risk for opportunistic infections    - hold valcyte as wbc low.  CMV PCR pending.  - Continue Bactrim for PCP proph  - isavu for fungal prophylaxis 2/2 complicated post-op course        Other ascites    - para today- 1600 ml removed.            Severe protein-calorie malnutrition    - consult dietician          Neutropenia    -  this am.    - Infectious work up in process.  - CMV PCR pending. Continue to monitor.        Nausea and vomiting    - h/o constipation, reports having regular BM's with bowel regimen  - vomiting episode x 1 at home. Anti-emetics ordered.  - KUB reviewed.        Pleural effusion, right    - CXR in ER with large right pleural effusion with subjective c/o worsening SOB  - thoracentesis completed- 1200 ml removed.  Cell count and Cx pending.        Protein-calorie malnutrition    - poor PO intake since transplant requiring short course of TPN during previous hospital stay.   - albumin remains decreased. Dietary consulted.         Peritonitis    - initially diagnosed in November but has since had improved cell counts in recent para's  - IR placed drain in fluid collection and paracentesis today- count spending.  They removed 1600ml w para.          Anemia of chronic disease    - h/h decreased but stable. Type and screen active. No acute signs of blood loss.   - may need to transfuse with next HD        Acute renal failure with tubular necrosis    - HD resumed on previous admission. Now  anuric and dialyzes M-W-F.  - Nephrology consulted- plan for HD tomorrow.            VTE Risk Mitigation         Ordered     Medium Risk of VTE  Once      01/11/18 0351     Place sequential compression device  Until discontinued      01/11/18 0351          The patients clinical status was discussed at multidisplinary rounds, involving transplant surgery, transplant medicine, pharmacy, nursing, nutrition, and social work    Discharge Planning:  NO PLAN FOR DISCHARGE      Norma Blackwood NP  Liver Transplant  [facility]

## 2018-01-11 NOTE — ASSESSMENT & PLAN NOTE
- CXR in ER with large right pleural effusion with subjective c/o worsening SOB  - may consider thoracentesis at same time as paracentesis to send for cell counts/culture

## 2018-01-11 NOTE — PROGRESS NOTES
Pt arrive to the IR dept room 173 forperi-hepatic fluid collection drainage with possible drain placement.

## 2018-01-11 NOTE — PLAN OF CARE
Problem: Patient Care Overview  Goal: Plan of Care Review  Outcome: Ongoing (interventions implemented as appropriate)  Pt AAOx4, VSS, in NAD throughout shift.  Pt off unit to IR for procedures for most of shift.  IR removed 2800 cc of fluid from pt in total.  Pt resting comfortably in room at this time.  RN maintaining fall risk precautions throughout shift.  Pt denies pain or other need at this time; RN will continue to monitor, assess, and alter plan of care as needed until report given to oncoming night shift nurse.

## 2018-01-11 NOTE — ASSESSMENT & PLAN NOTE
- unclear source of fever, previously thought to be related to peritonitis  - blood cultures prelim NGTD.  Urine cx pending  - Cont broad spectrum antibiotics.  ID consulted, ok to see in AM- cell counts from para and IR drainage pending.

## 2018-01-11 NOTE — ED PROVIDER NOTES
Encounter Date: 1/11/2018    SCRIBE #1 NOTE: I, Yohannes De Luna, am scribing for, and in the presence of,  Dr. Bradford . I have scribed the following portions of the note - the Resident attestation.       History     Chief Complaint   Patient presents with    Fever     Pt reports fever of 102 at home and emesis. Pt states he took Tylenol at home. 99.3 in triage. Liver Txp.     The history is provided by the patient and medical records.     19-year-old  male presents to the emergency Department due to nausea, vomiting and fever.  Patient states that he was feeling right until last night around him when he started to feel nauseated and he took his temperature and found it was 102.  Due to the elevated temperature he took Tylenol and then decided coming to the emergency department.  He has a medical history significant for liver transplant in October.  Liver transplant secondary to alcoholic cirrhosis.  Patient also has HTN which requires dialysis.  Patient has Monday dialysis.  Last dialysis was Wednesday.  Patient also has had a chronic wound VAC secondary to chevron incision.  For which he has been getting wound care for.  Denies any current chest pain but states that he has been feeling mildly short of breath.  Denies any coughing, dysuria (however does not urinate very much), lightheadedness or dizziness.    Review of patient's allergies indicates:   Allergen Reactions    Bactrim [sulfamethoxazole-trimethoprim] Other (See Comments)     Mookie Trell Syndrome     Past Medical History:   Diagnosis Date    Alcoholic hepatitis with ascites     Alcoholic hepatitis with ascites     History of hematemesis 9/28/2017    Hypertension     Renal disorder      Past Surgical History:   Procedure Laterality Date    APPENDECTOMY       History reviewed. No pertinent family history.  Social History   Substance Use Topics    Smoking status: Former Smoker     Types: Cigarettes    Smokeless tobacco: Never Used     Alcohol use No      Comment: a fifth of liquor daily for years, cut back over last 2 months     Review of Systems   Constitutional: Positive for fever. Negative for activity change and appetite change.   HENT: Negative for congestion and ear pain.    Eyes: Negative for pain and redness.   Respiratory: Negative for cough and shortness of breath.    Cardiovascular: Negative for chest pain and leg swelling.   Gastrointestinal: Positive for abdominal distention, abdominal pain and nausea.   Genitourinary: Negative for difficulty urinating and dysuria.   Musculoskeletal: Negative for arthralgias and back pain.   Skin: Negative for color change and rash.   Neurological: Negative for light-headedness and headaches.   All other systems reviewed and are negative.      Physical Exam     Initial Vitals [01/11/18 0037]   BP Pulse Resp Temp SpO2   (!) 101/50 104 17 99.3 °F (37.4 °C) 96 %      MAP       67         Physical Exam    Nursing note and vitals reviewed.  Constitutional: He appears well-developed. He is not diaphoretic. No distress.   HENT:   Head: Normocephalic and atraumatic.   Mouth/Throat: No oropharyngeal exudate.   Eyes: EOM are normal. Pupils are equal, round, and reactive to light.   Neck: Normal range of motion. Neck supple. No JVD present.   Cardiovascular: Normal rate, regular rhythm, normal heart sounds and intact distal pulses. Exam reveals no friction rub.    No murmur heard.  Pulmonary/Chest: Breath sounds normal. No stridor. No respiratory distress. He has no wheezes.   Abdominal: Soft. Bowel sounds are normal. He exhibits distension. There is tenderness.   Mild epigastric tenderness noted.  Patient with wound VAC to a chevron incision.  No signs of any bleeding, or erythema.   Musculoskeletal: Normal range of motion. He exhibits no edema or tenderness.   Neurological: He is alert and oriented to person, place, and time. He has normal strength. No cranial nerve deficit.   Skin: Skin is warm and dry. No  rash noted. No erythema.   Jaundiced skin         ED Course   Procedures  Labs Reviewed   CBC W/ AUTO DIFFERENTIAL - Abnormal; Notable for the following:        Result Value    WBC 2.94 (*)     RBC 2.56 (*)     Hemoglobin 7.8 (*)     Hematocrit 24.0 (*)     RDW 17.7 (*)     Immature Granulocytes 1.4 (*)     Gran # 0.9 (*)     Lymph # 0.9 (*)     Gran% 31.2 (*)     Mono% 34.4 (*)     All other components within normal limits   COMPREHENSIVE METABOLIC PANEL - Abnormal; Notable for the following:     BUN, Bld 4 (*)     Creatinine 2.4 (*)     Calcium 8.2 (*)     Total Protein 4.9 (*)     Albumin 1.8 (*)     Total Bilirubin 2.7 (*)     Alkaline Phosphatase 153 (*)     ALT <5 (*)     eGFR if  40.9 (*)     eGFR if non  35.4 (*)     All other components within normal limits   PROTIME-INR - Abnormal; Notable for the following:     Prothrombin Time 12.7 (*)     All other components within normal limits    Narrative:     add on per Dr Marquez order #[id number] 01/11/2018  03:02 PT   CBC W/ AUTO DIFFERENTIAL - Abnormal; Notable for the following:     WBC 2.81 (*)     RBC 2.48 (*)     Hemoglobin 7.6 (*)     Hematocrit 23.9 (*)     MCHC 31.8 (*)     RDW 18.0 (*)     All other components within normal limits   CULTURE, BLOOD   CULTURE, BLOOD   LIPASE   INFLUENZA A AND B ANTIGEN   LACTIC ACID, PLASMA   PROTIME-INR   URINALYSIS, REFLEX TO URINE CULTURE   COMPREHENSIVE METABOLIC PANEL   PROTIME-INR   MAGNESIUM   CYCLOSPORINE LEVEL   CMV DNA, QUANTITATIVE, PCR   TYPE & SCREEN        HOIII MDM  A/P: 27-year-old with fever.  Differential diagnosis includes but is not limited to neutropenic fever, pneumonia, upper respiratory infection, influenza, electrolyte abnormality.  Workup -CBC, CMP, urinalysis, lipase, chest x-ray, lactate.  Currently pending  Lui Marquez PGY-3 [employer] [facility]  01/11/2018 2:15 AM      Update:   Results significant for WBC 2.81, H/H 7.8/24, no significant electrolyte abnormality,  lactate negative, flu negative, CXR significant for right sided opacity, that is concerning for effusion vs consolidation. I have given abx, and consulted the pt to the transplant team who will admit him.   Lui Marquez PGY-3 [employer] [facility]  01/11/2018 5:08 AM         Medical Decision Making:   History:   Old Medical Records: I decided to obtain old medical records.  Clinical Tests:   Lab Tests: Reviewed and Ordered  Radiological Study: Ordered and Reviewed  Other:   I have discussed this case with another health care provider.            Scribe Attestation:   Scribe #1: I performed the above scribed service and the documentation accurately describes the services I performed. I attest to the accuracy of the note.    Attending Attestation:   Physician Attestation Statement for Resident:  As the supervising MD   Physician Attestation Statement: I have personally seen and examined this patient.   I agree with the above history. -: 27 y.o male with a history of liver transplant presents for evaluation of fever with nausea and vomiting.    As the supervising MD I agree with the above PE.    As the supervising MD I agree with the above treatment, course, plan, and disposition.  I have reviewed the following: old records at this facility.                    ED Course      Clinical Impression:   Diagnoses of Fever, Fever, unspecified fever cause, Acute renal failure with tubular necrosis, Anemia of chronic disease, At risk for opportunistic infections, Long-term use of immunosuppressant medication, Severe protein-calorie malnutrition, Nausea and vomiting, intractability of vomiting not specified, unspecified vomiting type, Peritonitis, Pleural effusion, right, Liver transplanted, and Neutropenia, unspecified type were pertinent to this visit.    Disposition:   Disposition: Admitted  Condition: Fair  Liver Txp                         Lui Marquez MD  Resident  01/11/18 0508       Omar Bradford MD  01/13/18  [id number]

## 2018-01-11 NOTE — SUBJECTIVE & OBJECTIVE
Past Medical History:   Diagnosis Date    Alcoholic hepatitis with ascites     Alcoholic hepatitis with ascites     History of hematemesis 9/28/2017    Hypertension     Renal disorder        Past Surgical History:   Procedure Laterality Date    APPENDECTOMY         Review of patient's allergies indicates:   Allergen Reactions    Bactrim [sulfamethoxazole-trimethoprim] Other (See Comments)     Mookie Trell Syndrome     Current Facility-Administered Medications   Medication Frequency    acetaminophen tablet 650 mg Q8H PRN    atovaquone suspension 1,500 mg Daily    cefepime 1g in dextrose 5% 50 mL IVPB (ready to mix system) Q12H    cycloSPORINE modified (NEORAL) capsule 75 mg BID    docusate sodium capsule 100 mg TID PRN    HYDROmorphone injection 0.5 mg On Call Procedure    levETIRAcetam tablet 500 mg BID    levothyroxine tablet 75 mcg Before breakfast    multivitamin tablet 1 tablet Daily    ondansetron disintegrating tablet 8 mg Q6H PRN    ondansetron injection 4 mg Q6H PRN    oxyCODONE immediate release tablet 10 mg Q6H PRN    oxyCODONE immediate release tablet 5 mg Q6H PRN    pantoprazole EC tablet 40 mg Daily    prochlorperazine injection Soln 10 mg Q6H PRN    sodium chloride 0.9% flush 3 mL PRN    thiamine tablet 100 mg Daily    ursodiol capsule 300 mg BID     Family History     None        Social History Main Topics    Smoking status: Former Smoker     Types: Cigarettes    Smokeless tobacco: Never Used    Alcohol use No      Comment: a fifth of liquor daily for years, cut back over last 2 months    Drug use: No    Sexual activity: Not on file     Review of Systems   Constitutional: Positive for activity change, chills, fatigue and fever. Negative for appetite change and unexpected weight change.   HENT: Negative for facial swelling, hearing loss and tinnitus.    Eyes: Negative for visual disturbance.   Respiratory: Negative for chest tightness and shortness of breath.     Cardiovascular: Negative for chest pain and leg swelling.   Gastrointestinal: Positive for nausea and vomiting. Negative for abdominal pain and diarrhea.   Genitourinary: Negative for difficulty urinating, dysuria and flank pain.   Musculoskeletal: Negative for arthralgias and myalgias.   Skin: Negative for color change.   Neurological: Negative for dizziness, syncope, weakness, light-headedness and headaches.   Psychiatric/Behavioral: Negative for behavioral problems and confusion.     Objective:     Vital Signs (Most Recent):  Temp: 98.9 °F (37.2 °C) (01/11/18 0737)  Pulse: 90 (01/11/18 1451)  Resp: 17 (01/11/18 1451)  BP: (!) 116/59 (01/11/18 1451)  SpO2: 97 % (01/11/18 1451)  O2 Device (Oxygen Therapy): room air (01/11/18 1451) Vital Signs (24h Range):  Temp:  [98.5 °F (36.9 °C)-99.3 °F (37.4 °C)] 98.9 °F (37.2 °C)  Pulse:  [] 90  Resp:  [12-22] 17  SpO2:  [95 %-100 %] 97 %  BP: ()/(25-61) 116/59     Weight: 72.6 kg (160 lb) (01/11/18 0037)  Body mass index is 25.82 kg/m².  Body surface area is 1.84 meters squared.    No intake/output data recorded.    Physical Exam   Constitutional: He is oriented to person, place, and time. He appears well-developed. No distress.   HENT:   Head: Normocephalic and atraumatic.   Eyes: Conjunctivae are normal. Pupils are equal, round, and reactive to light.   Neck: Trachea normal. Neck supple. No JVD present.   Cardiovascular: Normal rate, regular rhythm, S1 normal, S2 normal, normal heart sounds, intact distal pulses and normal pulses.  Exam reveals no gallop and no friction rub.    No murmur heard.  Pulmonary/Chest: Effort normal and breath sounds normal.   Abdominal: Soft. Bowel sounds are normal. He exhibits distension, fluid wave and ascites.   Musculoskeletal: Normal range of motion. He exhibits edema (1+ pitting edema).   Neurological: He is alert and oriented to person, place, and time.   Skin: Skin is warm and dry. Capillary refill takes less than 2  seconds.   Psychiatric: He has a normal mood and affect. His behavior is normal.   Vitals reviewed.      Significant Labs:  BMP:   Recent Labs  Lab 01/11/18  0434   GLU 89      CO2 28   BUN 5*   CREATININE 2.4*   CALCIUM 8.1*   MG 1.7     Cardiac Markers: No results for input(s): CKMB, TROPONINT, MYOGLOBIN in the last 168 hours.  CBC:   Recent Labs  Lab 01/11/18 0434   WBC 2.81*   RBC 2.48*   HGB 7.6*   HCT 23.9*      MCV 96   MCH 30.6   MCHC 31.8*     CMP:   Recent Labs  Lab 01/11/18 0434   GLU 89   CALCIUM 8.1*   ALBUMIN 1.8*   PROT 4.7*      K 3.1*   CO2 28      BUN 5*   CREATININE 2.4*   ALKPHOS 144*   ALT <5*   AST 21   BILITOT 2.6*     No results for input(s): COLORU, CLARITYU, SPECGRAV, PHUR, PROTEINUA, GLUCOSEU, BILIRUBINCON, BLOODU, WBCU, RBCU, BACTERIA, MUCUS, NITRITE, LEUKOCYTESUR, UROBILINOGEN, HYALINECASTS in the last 168 hours.  All labs within the past 24 hours have been reviewed.    Significant Imaging:  Labs: Reviewed

## 2018-01-11 NOTE — SUBJECTIVE & OBJECTIVE
Past Medical History:   Diagnosis Date    Alcoholic hepatitis with ascites     Alcoholic hepatitis with ascites     History of hematemesis 9/28/2017    Hypertension     Renal disorder        Past Surgical History:   Procedure Laterality Date    APPENDECTOMY         Review of patient's allergies indicates:   Allergen Reactions    Bactrim [sulfamethoxazole-trimethoprim] Other (See Comments)     Mookie Trell Syndrome       Medications:  Prescriptions Prior to Admission   Medication Sig    aspirin (ECOTRIN) 81 MG EC tablet Take 1 tablet (81 mg total) by mouth once daily.    atovaquone (MEPRON) 750 mg/5 mL Susp Take 10 mLs (1,500 mg total) by mouth once daily. Stop on 4/17/18    cycloSPORINE modified, NEORAL, 25 MG capsule Take 3 capsules (75 mg total) by mouth 2 (two) times daily.    docusate sodium (COLACE) 100 MG capsule Take 100 mg by mouth 3 (three) times daily as needed for Constipation.    ergocalciferol (ERGOCALCIFEROL) 50,000 unit Cap Take 1 capsule (50,000 Units total) by mouth every 7 days.    levETIRAcetam (KEPPRA) 500 MG Tab Take 1 tablet (500 mg total) by mouth 2 (two) times daily.    levothyroxine (SYNTHROID) 75 MCG tablet Take 1 tablet (75 mcg total) by mouth before breakfast.    mirtazapine (REMERON) 7.5 MG Tab Take 1 tablet (7.5 mg total) by mouth every evening.    multivitamin (THERAGRAN) tablet Take 1 tablet by mouth once daily.    ondansetron (ZOFRAN-ODT) 8 MG TbDL Take 1 tablet (8 mg total) by mouth every 8 (eight) hours as needed (nausea).    oxyCODONE (ROXICODONE) 10 mg Tab immediate release tablet Take 0.5-1 tablets (5-10 mg total) by mouth every 6 (six) hours as needed for Pain.    pantoprazole (PROTONIX) 40 MG tablet Take 1 tablet (40 mg total) by mouth once daily.    sodium bicarbonate 650 MG tablet Take 2 tablets (1,300 mg total) by mouth 2 (two) times daily.    thiamine 100 MG tablet Take 1 tablet (100 mg total) by mouth once daily.    ursodiol (ACTIGALL) 300 mg  capsule Take 1 capsule (300 mg total) by mouth 2 (two) times daily.     Antibiotics     Start     Stop Route Frequency Ordered    01/11/18 1400  cefepime 1g in dextrose 5% 50 mL IVPB (ready to mix system)      -- IV Every 12 hours (non-standard times) 01/11/18 0924        Antifungals     None        Antivirals     None           Immunization History   Administered Date(s) Administered    Hepatitis A, Pediatric/Adolescent, 2 Dose 10/11/2017    Hepatitis B, Adult 10/12/2017    Influenza - High Dose 10/12/2017    Pneumococcal Conjugate - 13 Valent 10/12/2017    Tdap 10/11/2017       Family History     None        Social History     Social History    Marital status: Single     Spouse name: N/A    Number of children: N/A    Years of education: N/A     Social History Main Topics    Smoking status: Former Smoker     Types: Cigarettes    Smokeless tobacco: Never Used    Alcohol use No      Comment: a fifth of liquor daily for years, cut back over last 2 months    Drug use: No    Sexual activity: Not Asked     Other Topics Concern    None     Social History Narrative    None     Review of Systems   Unable to perform ROS: Other (pt down in IR)   Genitourinary:        Anuric     Allergic/Immunologic: Positive for immunocompromised state.     Objective:     Vital Signs (Most Recent):  Temp: 98.9 °F (37.2 °C) (01/11/18 0737)  Pulse: 90 (01/11/18 1451)  Resp: 17 (01/11/18 1451)  BP: (!) 116/59 (01/11/18 1451)  SpO2: 97 % (01/11/18 1451) Vital Signs (24h Range):  Temp:  [98.5 °F (36.9 °C)-99.3 °F (37.4 °C)] 98.9 °F (37.2 °C)  Pulse:  [] 90  Resp:  [12-22] 17  SpO2:  [95 %-100 %] 97 %  BP: ()/(25-61) 116/59     Weight: 72.6 kg (160 lb)  Body mass index is 25.82 kg/m².    Estimated Creatinine Clearance: 41.4 mL/min (based on SCr of 2.4 mg/dL (H)).    Physical Exam   Nursing note and vitals reviewed.   Unable to perform, patient down in procedure    Significant Labs:   Blood Culture:   Recent Labs  Lab  12/03/17  0119 12/09/17  0830 12/20/17  0934 01/11/18  0159 01/11/18  0205   LABBLOO No growth after 5 days. No growth after 5 days.  No growth after 5 days. No growth after 5 days.  No growth after 5 days. No Growth to date No Growth to date     CBC:   Recent Labs  Lab 01/11/18  0143 01/11/18  0434   WBC 2.94* 2.81*   HGB 7.8* 7.6*   HCT 24.0* 23.9*    205     CMP:   Recent Labs  Lab 01/11/18  0143 01/11/18  0434    137   K 3.7 3.1*    102   CO2 27 28   GLU 80 89   BUN 4* 5*   CREATININE 2.4* 2.4*   CALCIUM 8.2* 8.1*   PROT 4.9* 4.7*   ALBUMIN 1.8* 1.8*   BILITOT 2.7* 2.6*   ALKPHOS 153* 144*   AST 22 21   ALT <5* <5*   ANIONGAP 10 7*   EGFRNONAA 35.4* 35.4*     Respiratory Culture:   Recent Labs  Lab 10/06/17  1921   GSRESP <10 epithelial cells per low power field  No WBC's  Few Gram negative rods   RESPIRATORYC KLEBSIELLA PNEUMONIAE ESBLMany     Wound Culture:   Recent Labs  Lab 11/29/17  1656 12/05/17  1657 12/11/17  1447 12/13/17  0834 12/18/17  1118   LABAERO No growth No growth No growth No growth No growth       Significant Imaging: I have reviewed all pertinent imaging results/findings within the past 24 hours.

## 2018-01-11 NOTE — ASSESSMENT & PLAN NOTE
- HD resumed on previous admission. Now anuric and dialyzes M-W-F.  - Nephrology consulted- plan for HD tomorrow.

## 2018-01-11 NOTE — PROGRESS NOTES
Admit Note     Met with mother to assess patients needs due to going to IR for thora/para.  Pt verbalized agreement for SW to meet with mom this morning.  Patient is a 27 y.o. single male, admitted post liver transplantation on 10/19/2017with fever, abdominal pain, nausea and vomitting.    Patient admitted from ED on 1/11/2018 .  At this time, patient presents as alert and oriented x 4, pleasant, good eye contact before going off to IR.  At this time, patients caregiver presents as alert and oriented x 4, pleasant, good eye contact, well groomed, recall good, concentration/judgement good, average intelligence, calm, communicative, cooperative, asking and answering questions appropriately and tired/exhausted.      Household/Family Systems (as reported by patients caregiver)     Patient resides with patient's mother, at [address]  [place] [address].  Pt no longer has his apartment in [place].   Support system includes mother, sister Miranda and aunt Sophy.   Patient's mom really handing everything on her own, since everyone who could give her a break for a day, lives 5 hours away.  Patient does not have dependents that are need of being cared for.     Patients primary caregiver is Sanjuanita Lebron, patients mother, phone number [phone].  Confirmed patients contact information is [phone] (home);   Telephone Information:   Mobile [phone]   .    During admission, patient's caregiver plans to stay in patient's room.  Confirmed patient and patients caregivers do have access to reliable transportation.    Cognitive Status/Learning     Patients caregiver reports patients reading ability as college and states patient does have difficulty with concentration and comprhension.  Patients caregiver reports patient learns best by written and verbal information and demonstration.   Needed: No.   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  He received FMLA finally.  [employer] is paying for patient's health insurance at this time.   Pts mom reports she was verbally told patient was approved for STD benefits with [employer], but patient has not received any income at this time, there is supposed to be a check on its way. .  Patient will transition to LTD benefits in March 2018.    Adherence     Patients caregiver reports patient has a high level of adherence to patients health care regimen.  Adherence counseling and education provided.  Patient's caregiver verbalizes understanding.    Substance Use    Patients caregiver reports patients substance usage as the following:    Tobacco: none, patient denies any use.  Alcohol: Pt last ETOH use 8/2017.  He stopped when diagnosised with liver disease.  Pt drank daily and heavily in the past.  Pt tentatively enrolled in [facility] , but has not been able to start the program due to continued medical complications.  Ps mother has been in communciation with Cassy at [facility]..  Illicit Drugs/Non-prescribed Medications: none, patient denies any use.  Brief addiction to loratab in 2011.    Patients caregiver states clear understanding of the potential impact of substance use.  Substance abstinence/cessation counseling, education and resources provided and reviewed.     Services Utilizing/ADLS (as reported by patients caregiver)    Infusion Service: Prior to admission, patient utilizing? no  Home Health: Prior to admission, patient utilizing? yes [facility]  ([phone]), SN only  DME: Prior to admission, yes RW and WC; Wound Vac from [facility]  Pulmonary/Cardiac Rehab: Prior to admission, no  Dialysis:  Prior to admission, yes [facility] 3pm ([phone])  Transplant Specialty Pharmacy:  Prior to admission, yes; [facility].    Prior to admission,  patients caregiver reports patient was not independent with ADLS and was not driving.  Patients caregiver reports patient is not able to care for self at this time due to compromised medical condition (as documented in medical record) and physical weakness..  Patients caregiver reports patient indicates a willingness to care for self once medically cleared to do so.    Insurance/Medications    Insured by   Payor/Plan Subscr  Sex Relation Sub. Ins. ID Effective Group Num   1. [facility]* RAFFY THOMAS 1990 Male  [id number]                                   [address]   2. [facility]* RAFFY THOMAS 1990 Male  [id number]                                   [address] [address]      Primary Insurance (for UNOS reporting): Private Insurance  Secondary Insurance (for UNOS reporting): None    Patients caregiver reports patient is able to obtain and afford medications at this time and at time of discharge.    Living Will/Healthcare Power of     Patients caregiver reports patient has a LW and/or HCPA.   provided education regarding LW and HCPA and the completion of forms.    Coping/Mental Health (as reported by patients caregiver)    Patient is coping well with the aid of  family members. Pts mom concerned as patient has lost so much weight and muscle mass since transplant, he is a skeleton.   Pt tries to eat, but it is difficult for him to et very much.  EZEKIEL discussed with pts mom, the possiblity of patient going to outpatient counseling with one of the addiction social workers at [facility] while he is waiting to be able to physically and medically start [facility].   Patients caregiver is coping well with the aid of  family members and friends. Pts mom exhausted at this time as she is the only help for patient and he depends on her so much.  EZEKIEL provided pts mom with transplant support group flyer for 2018 and encourage her to attend  caregiver support group and bring pt to liver transplant support group.  She is in agreement.  She was going to get lunch and then take a nap while pt getting procedure.       Discharge Planning (as reported by patients caregiver)    At time of discharge, patient plans to return to [place] apartments under the care of mom Sanjuanita Diana.  Patients mother will transport patient.  Per rounds today, expected discharge date has not been medically determined at this time. Patients caretaker verbalizes understanding and is involved in treatment planning and discharge process.    Additional Concerns    Patient's caretaker denies additional needs and/or concerns at this time. Patient is being followed for needs, education, resources, information, emotional support, supportive counseling, and for supportive and skilled discharge plan of care.  providing ongoing psychosocial support, education, resources and d/c planning as needed.  SW remains available. Patient's caregiver verbalizes understanding and agreement with information reviewed,  availability and how to access available resources as needed. Patient verbalizes understanding and agreement with information reviewed, social work availability, and how to access available resources as needed.

## 2018-01-12 LAB
ALBUMIN SERPL BCP-MCNC: 1.5 G/DL
ALP SERPL-CCNC: 124 U/L
ALT SERPL W/O P-5'-P-CCNC: <5 U/L
ANION GAP SERPL CALC-SCNC: 7 MMOL/L
ANISOCYTOSIS BLD QL SMEAR: SLIGHT
AST SERPL-CCNC: 18 U/L
BASOPHILS # BLD AUTO: 0.02 K/UL
BASOPHILS NFR BLD: 0.7 %
BILIRUB DIRECT SERPL-MCNC: 2 MG/DL
BILIRUB SERPL-MCNC: 2.7 MG/DL
BLD PROD TYP BPU: NORMAL
BLOOD UNIT EXPIRATION DATE: NORMAL
BLOOD UNIT TYPE CODE: 5100
BLOOD UNIT TYPE: NORMAL
BUN SERPL-MCNC: 10 MG/DL
CALCIUM SERPL-MCNC: 8 MG/DL
CHLORIDE SERPL-SCNC: 103 MMOL/L
CO2 SERPL-SCNC: 25 MMOL/L
CODING SYSTEM: NORMAL
CREAT SERPL-MCNC: 3.6 MG/DL
CYCLOSPORINE BLD LC/MS/MS-MCNC: 244 NG/ML
DIFFERENTIAL METHOD: ABNORMAL
DISPENSE STATUS: NORMAL
EOSINOPHIL # BLD AUTO: 0.1 K/UL
EOSINOPHIL NFR BLD: 3 %
ERYTHROCYTE [DISTWIDTH] IN BLOOD BY AUTOMATED COUNT: 17.4 %
EST. GFR  (AFRICAN AMERICAN): 25.1 ML/MIN/1.73 M^2
EST. GFR  (NON AFRICAN AMERICAN): 21.7 ML/MIN/1.73 M^2
GLUCOSE SERPL-MCNC: 63 MG/DL
HCT VFR BLD AUTO: 23.6 %
HGB BLD-MCNC: 7.6 G/DL
HYPOCHROMIA BLD QL SMEAR: ABNORMAL
IMM GRANULOCYTES # BLD AUTO: 0.01 K/UL
IMM GRANULOCYTES NFR BLD AUTO: 0.4 %
LYMPHOCYTES # BLD AUTO: 0.9 K/UL
LYMPHOCYTES NFR BLD: 34.8 %
MAGNESIUM SERPL-MCNC: 1.6 MG/DL
MCH RBC QN AUTO: 30.8 PG
MCHC RBC AUTO-ENTMCNC: 32.2 G/DL
MCV RBC AUTO: 96 FL
MONOCYTES # BLD AUTO: 0.8 K/UL
MONOCYTES NFR BLD: 29.6 %
NEUTROPHILS # BLD AUTO: 0.9 K/UL
NEUTROPHILS NFR BLD: 31.5 %
NRBC BLD-RTO: 0 /100 WBC
PLATELET # BLD AUTO: 234 K/UL
PLATELET BLD QL SMEAR: ABNORMAL
PMV BLD AUTO: 10.1 FL
POTASSIUM SERPL-SCNC: 4 MMOL/L
PROT SERPL-MCNC: 4.1 G/DL
RBC # BLD AUTO: 2.47 M/UL
SODIUM SERPL-SCNC: 135 MMOL/L
TRANS ERYTHROCYTES VOL PATIENT: NORMAL ML
WBC # BLD AUTO: 2.7 K/UL

## 2018-01-12 PROCEDURE — 25000003 PHARM REV CODE 250: Performed by: HOSPITALIST

## 2018-01-12 PROCEDURE — 80053 COMPREHEN METABOLIC PANEL: CPT

## 2018-01-12 PROCEDURE — 63600175 PHARM REV CODE 636 W HCPCS: Performed by: SURGERY

## 2018-01-12 PROCEDURE — 82248 BILIRUBIN DIRECT: CPT

## 2018-01-12 PROCEDURE — 85025 COMPLETE CBC W/AUTO DIFF WBC: CPT

## 2018-01-12 PROCEDURE — 90935 HEMODIALYSIS ONE EVALUATION: CPT | Mod: ,,, | Performed by: INTERNAL MEDICINE

## 2018-01-12 PROCEDURE — 20600001 HC STEP DOWN PRIVATE ROOM

## 2018-01-12 PROCEDURE — 25000003 PHARM REV CODE 250: Performed by: NURSE PRACTITIONER

## 2018-01-12 PROCEDURE — 63600175 PHARM REV CODE 636 W HCPCS: Performed by: NURSE PRACTITIONER

## 2018-01-12 PROCEDURE — 25000003 PHARM REV CODE 250: Performed by: PHYSICIAN ASSISTANT

## 2018-01-12 PROCEDURE — 80158 DRUG ASSAY CYCLOSPORINE: CPT

## 2018-01-12 PROCEDURE — 25000003 PHARM REV CODE 250: Performed by: SURGERY

## 2018-01-12 PROCEDURE — 36415 COLL VENOUS BLD VENIPUNCTURE: CPT

## 2018-01-12 PROCEDURE — 83735 ASSAY OF MAGNESIUM: CPT

## 2018-01-12 PROCEDURE — P9021 RED BLOOD CELLS UNIT: HCPCS

## 2018-01-12 PROCEDURE — 99233 SBSQ HOSP IP/OBS HIGH 50: CPT | Mod: ,,, | Performed by: INTERNAL MEDICINE

## 2018-01-12 PROCEDURE — 63600175 PHARM REV CODE 636 W HCPCS: Performed by: HOSPITALIST

## 2018-01-12 PROCEDURE — 97802 MEDICAL NUTRITION INDIV IN: CPT | Performed by: DIETITIAN, REGISTERED

## 2018-01-12 PROCEDURE — 90935 HEMODIALYSIS ONE EVALUATION: CPT

## 2018-01-12 RX ORDER — OXYCODONE HYDROCHLORIDE 5 MG/1
5 TABLET ORAL EVERY 4 HOURS PRN
Status: DISCONTINUED | OUTPATIENT
Start: 2018-01-12 | End: 2018-01-25

## 2018-01-12 RX ORDER — ERGOCALCIFEROL 1.25 MG/1
50000 CAPSULE ORAL
Status: DISCONTINUED | OUTPATIENT
Start: 2018-01-12 | End: 2018-01-18

## 2018-01-12 RX ORDER — HYDROMORPHONE HYDROCHLORIDE 1 MG/ML
0.5 INJECTION, SOLUTION INTRAMUSCULAR; INTRAVENOUS; SUBCUTANEOUS DAILY PRN
Status: DISCONTINUED | OUTPATIENT
Start: 2018-01-12 | End: 2018-01-12

## 2018-01-12 RX ORDER — SODIUM CHLORIDE 9 MG/ML
INJECTION, SOLUTION INTRAVENOUS
Status: DISCONTINUED | OUTPATIENT
Start: 2018-01-12 | End: 2018-01-14

## 2018-01-12 RX ORDER — OXYCODONE HYDROCHLORIDE 5 MG/1
10 TABLET ORAL EVERY 4 HOURS PRN
Status: DISCONTINUED | OUTPATIENT
Start: 2018-01-12 | End: 2018-01-21

## 2018-01-12 RX ORDER — HEPARIN SODIUM 1000 [USP'U]/ML
1000 INJECTION, SOLUTION INTRAVENOUS; SUBCUTANEOUS
Status: COMPLETED | OUTPATIENT
Start: 2018-01-12 | End: 2018-02-02

## 2018-01-12 RX ORDER — SODIUM CHLORIDE 9 MG/ML
INJECTION, SOLUTION INTRAVENOUS ONCE
Status: DISCONTINUED | OUTPATIENT
Start: 2018-01-12 | End: 2018-01-14

## 2018-01-12 RX ORDER — HYDROMORPHONE HYDROCHLORIDE 2 MG/ML
0.5 INJECTION, SOLUTION INTRAMUSCULAR; INTRAVENOUS; SUBCUTANEOUS DAILY PRN
Status: DISCONTINUED | OUTPATIENT
Start: 2018-01-12 | End: 2018-01-12

## 2018-01-12 RX ORDER — MIRTAZAPINE 7.5 MG/1
7.5 TABLET, FILM COATED ORAL NIGHTLY
Status: DISCONTINUED | OUTPATIENT
Start: 2018-01-12 | End: 2018-01-18

## 2018-01-12 RX ORDER — HEPARIN SODIUM 5000 [USP'U]/ML
5000 INJECTION, SOLUTION INTRAVENOUS; SUBCUTANEOUS EVERY 12 HOURS
Status: DISCONTINUED | OUTPATIENT
Start: 2018-01-12 | End: 2018-01-30

## 2018-01-12 RX ORDER — HYDROCODONE BITARTRATE AND ACETAMINOPHEN 500; 5 MG/1; MG/1
TABLET ORAL
Status: DISCONTINUED | OUTPATIENT
Start: 2018-01-12 | End: 2018-01-14

## 2018-01-12 RX ADMIN — HEPARIN SODIUM 1000 UNITS: 1000 INJECTION, SOLUTION INTRAVENOUS; SUBCUTANEOUS at 11:01

## 2018-01-12 RX ADMIN — HEPARIN SODIUM 5000 UNITS: 5000 INJECTION, SOLUTION INTRAVENOUS; SUBCUTANEOUS at 12:01

## 2018-01-12 RX ADMIN — LEVOTHYROXINE SODIUM 75 MCG: 75 TABLET ORAL at 05:01

## 2018-01-12 RX ADMIN — CEFEPIME HYDROCHLORIDE 1 G: 1 INJECTION, SOLUTION INTRAVENOUS at 05:01

## 2018-01-12 RX ADMIN — SODIUM CHLORIDE: 0.9 INJECTION, SOLUTION INTRAVENOUS at 07:01

## 2018-01-12 RX ADMIN — OXYCODONE HYDROCHLORIDE 10 MG: 5 TABLET ORAL at 11:01

## 2018-01-12 RX ADMIN — OXYCODONE HYDROCHLORIDE 10 MG: 5 TABLET ORAL at 07:01

## 2018-01-12 RX ADMIN — HYDROMORPHONE HYDROCHLORIDE 0.5 MG: 2 INJECTION INTRAMUSCULAR; INTRAVENOUS; SUBCUTANEOUS at 12:01

## 2018-01-12 RX ADMIN — MIRTAZAPINE 7.5 MG: 7.5 TABLET ORAL at 08:01

## 2018-01-12 RX ADMIN — LEVETIRACETAM 500 MG: 500 TABLET ORAL at 07:01

## 2018-01-12 RX ADMIN — LEVETIRACETAM 500 MG: 500 TABLET ORAL at 12:01

## 2018-01-12 RX ADMIN — URSODIOL 300 MG: 300 CAPSULE ORAL at 12:01

## 2018-01-12 RX ADMIN — Medication 100 MG: at 12:01

## 2018-01-12 RX ADMIN — OXYCODONE HYDROCHLORIDE 10 MG: 5 TABLET ORAL at 03:01

## 2018-01-12 RX ADMIN — CYCLOSPORINE 75 MG: 25 CAPSULE, LIQUID FILLED ORAL at 12:01

## 2018-01-12 RX ADMIN — THERA TABS 1 TABLET: TAB at 12:01

## 2018-01-12 RX ADMIN — HEPARIN SODIUM 5000 UNITS: 5000 INJECTION, SOLUTION INTRAVENOUS; SUBCUTANEOUS at 07:01

## 2018-01-12 RX ADMIN — CYCLOSPORINE 75 MG: 25 CAPSULE, LIQUID FILLED ORAL at 07:01

## 2018-01-12 RX ADMIN — CEFEPIME HYDROCHLORIDE 1 G: 1 INJECTION, SOLUTION INTRAVENOUS at 04:01

## 2018-01-12 RX ADMIN — OXYCODONE HYDROCHLORIDE 10 MG: 5 TABLET ORAL at 10:01

## 2018-01-12 RX ADMIN — URSODIOL 300 MG: 300 CAPSULE ORAL at 07:01

## 2018-01-12 RX ADMIN — ATOVAQUONE 1500 MG: 750 SUSPENSION ORAL at 12:01

## 2018-01-12 NOTE — PROGRESS NOTES
Patient arrived via wc and weighed standing. 0743 Maintenance HD initiated to right IJ perm cath without difficulty. Lines secured. Patient c/o pain 8/10 when asked; states pain med not due until after 9 am. Monitoring

## 2018-01-12 NOTE — ASSESSMENT & PLAN NOTE
-  1/11/18.    - Infectious work up negative so far.   - Cont with BS antibxs as with fevers on admit.   - CMV PCR pending. Monitor.

## 2018-01-12 NOTE — ASSESSMENT & PLAN NOTE
Nutrition Diagnosis  Inadequate energy intake    Related to (etiology):   Decreased appetite    Signs and Symptoms (as evidenced by):   Pt meeting ~25% of needs at most, hollowing temples, protruding clavicle, visible scapula     Interventions/Recommendations (treatment strategy):  See recs    Nutrition Diagnosis Status:   New

## 2018-01-12 NOTE — PROGRESS NOTES
[facility]  Liver Transplant  Progress Note    Patient Name: Jhonny Diana  MRN: [id number]  Admission Date: 2018  Hospital Length of Stay: 1 days  Code Status: Full Code  Primary Care Provider: Primary Doctor No  Post-Operative Day: 85    ORGAN:   LIVER  Disease Etiology: Acute Alcoholic Hepatitis  Donor Type:    - Brain Death  CDC High Risk:   No  Donor CMV Status:   Donor CMV Status: Positive  Donor HBcAB:   Negative  Donor HCV Status:   Negative  Whole or Partial: Whole Liver  Biliary Anastomosis: End to End  Arterial Anatomy: Standard  Subjective:     History of Present Illness:  Jhonny Diana is a 28 y/o male with past medical history of alcoholic cirrhosis.  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 Found to have peritonitis in November (WBC 5000, 75% PNM) neg for bile leak. He was treated initially with vanc/cefepime. Repeat cell counts  with some improvement (WBC 1400, 45% PNM). He has undergone multiple paracenteses as well as abscess drainage of perihepatic fluid collections and treated with antimicrobial therapy but no positive cultures. Of note, biliary stricture also identified and ERCP performed on 2017 with sphincterotomy and biliary stent placed. Liver tests still have not normalized despite intervention, bilirubin and AP remain elevated. Other pertinent PMH current wound vac in place 2/2 wound infection, malnutrition requiring TPN for short course and ongoing hypoalbuminemia, and seizure activity while on prograf and has since been switched to cyclosporine without reoccurrence.  He presented to the ER for fever, abdominal pain, and N/V. He reports fever (103) for 1 day  prior. Overnight, he developed N/V, reports small amount of green emesis with new left sided pain. He also endorses worsening SOB with exertion. He was scheduled as an outpatient for follow up paracentesis and IR drainage of fluid collection. CXR in ER shows large pleural effusion with subsegmental atelectasis. Infectious work up initiated in ER. His ANC is 900. Broad spectrum antibiotics initiated in ED. He denies chest pain, palpitations, diarrhea, constipation, or back pain. Denies any sick contacts.    Hospital Course:  Interval History: no acute events since admit. Remains afebrile. Reports poor sleep overnight and not feeling well this am. Cont BS abx- vanc and cefepime.  ID consulted- Appre recs. Fluid from thoracentesis, paracentesis, and fluid collection all noted without infection. CMV pending. T bili also noted with continued mild elevation. May need repeat ERCP if worsens. Monitor.    Scheduled Meds:   sodium chloride 0.9%   Intravenous Once    atovaquone  1,500 mg Oral Daily    ceFEPime (MAXIPIME) IVPB  1 g Intravenous Q12H    cycloSPORINE modified (NEORAL)  75 mg Oral BID    ergocalciferol  50,000 Units Oral Q7 Days    heparin (porcine)  5,000 Units Subcutaneous Q12H    levETIRAcetam  500 mg Oral BID    levothyroxine  75 mcg Oral Before breakfast    mirtazapine  7.5 mg Oral QHS    multivitamin  1 tablet Oral Daily    pantoprazole  40 mg Oral Daily    thiamine  100 mg Oral Daily    ursodiol  300 mg Oral BID     Continuous Infusions:  PRN Meds:sodium chloride, sodium chloride 0.9%, acetaminophen, docusate sodium, heparin (porcine), HYDROmorphone, ondansetron, ondansetron, oxyCODONE, oxyCODONE, prochlorperazine, sodium chloride 0.9%    Review of Systems   Constitutional: Positive for activity change, appetite change and fever. Negative for chills and fatigue.   HENT: Negative.    Respiratory: Negative for cough, shortness of breath and wheezing.    Cardiovascular: Positive for leg swelling.  Negative for chest pain.   Gastrointestinal: Positive for abdominal distention and abdominal pain. Negative for constipation, nausea and vomiting.   Genitourinary: Negative for difficulty urinating and dysuria.        Anuric   Musculoskeletal: Negative for arthralgias, back pain and myalgias.   Skin: Positive for wound (chevron incision with wound vac). Negative for color change and rash.   Allergic/Immunologic: Positive for immunocompromised state.   Neurological: Positive for weakness. Negative for dizziness, seizures and headaches.   Psychiatric/Behavioral: Negative for confusion and decreased concentration. The patient is not nervous/anxious.      Objective:     Vital Signs (Most Recent):  Temp: 98.7 °F (37.1 °C) (01/12/18 1212)  Pulse: 95 (01/12/18 1212)  Resp: 18 (01/12/18 1212)  BP: 116/61 (01/12/18 1212)  SpO2: 98 % (01/12/18 1212) Vital Signs (24h Range):  Temp:  [98.2 °F (36.8 °C)-99.6 °F (37.6 °C)] 98.7 °F (37.1 °C)  Pulse:  [86-98] 95  Resp:  [12-20] 18  SpO2:  [95 %-98 %] 98 %  BP: ()/(55-75) 116/61     Weight: 62.3 kg (137 lb 5.6 oz)  Body mass index is 22.17 kg/m².    Intake/Output - Last 3 Shifts       01/10 0700 - 01/11 0659 01/11 0700 - 01/12 0659 01/12 0700 - 01/13 0659    P.O.  360     Other   950    IV Piggyback  100     Total Intake(mL/kg)  460 (7.4) 950 (15.2)    Urine (mL/kg/hr)  0 (0)     Emesis/NG output  0 (0)     Drains  40 (0)     Other  2800 (1.9) 3006 (7)    Stool  0 (0)     Blood  0 (0)     Total Output   2840 3006    Net   -2380 -2056           Urine Occurrence  0 x     Stool Occurrence  0 x     Emesis Occurrence  0 x           Physical Exam   Constitutional: He is oriented to person, place, and time. He appears well-developed. No distress.   Temporal and distal extremity muscle wasting   HENT:   Head: Normocephalic and atraumatic.   Mouth/Throat: No oropharyngeal exudate.   Eyes: EOM are normal. Pupils are equal, round, and reactive to light. Scleral icterus is present.    Neck: Normal range of motion. Neck supple. No JVD present. No thyromegaly present.   Cardiovascular: Normal rate, regular rhythm, normal heart sounds and intact distal pulses.    No murmur heard.  Pulmonary/Chest: Effort normal. No respiratory distress. He has decreased breath sounds in the right middle field, the right lower field and the left lower field. He has no wheezes. He exhibits no tenderness.   Diminished to RLL   Abdominal: Soft. Bowel sounds are normal. He exhibits distension and ascites. There is tenderness (mild). There is no rebound and no guarding.   Wound vac to incision   Musculoskeletal: Normal range of motion. He exhibits edema (2+ LE edema). He exhibits no tenderness.   Neurological: He is alert and oriented to person, place, and time. He has normal reflexes.   Skin: Skin is warm and dry. Capillary refill takes 2 to 3 seconds. He is not diaphoretic. No erythema.   jaundice   Psychiatric: He has a normal mood and affect. His behavior is normal. Judgment and thought content normal. His mood appears not anxious.   Nursing note and vitals reviewed.      Laboratory:  Immunosuppressants         Stop Route Frequency     cycloSPORINE modified (NEORAL) capsule 75 mg      -- Oral 2 times daily        CBC:   Recent Labs  Lab 01/12/18  0627   WBC 2.70*   RBC 2.47*   HGB 7.6*   HCT 23.6*      MCV 96   MCH 30.8   MCHC 32.2     CMP:   Recent Labs  Lab 01/12/18  0627   GLU 63*   CALCIUM 8.0*   ALBUMIN 1.5*   PROT 4.1*   *   K 4.0   CO2 25      BUN 10   CREATININE 3.6*   ALKPHOS 124   ALT <5*   AST 18   BILITOT 2.7*     Coagulation:   Recent Labs  Lab 01/11/18  0434   INR 1.2     Labs within the past 24 hours have been reviewed.    Diagnostic Results:  I have personally reviewed all pertinent imaging studies.    Assessment/Plan:     * Fever    - unclear source of fever, previously thought to be related to peritonitis  - blood cultures prelim NGTD. No urine cx obtained as pt anuric.   - Cont  broad spectrum antibiotics for now as per ID recs.   - Pt underwent thoracentesis, paracentesis, and drain placement into fluid collection 1/11/18.   - All fluids reviewed and negative for infection. F/u on cultures.         Liver transplanted    - post op course complicated by fevers and hyperbilirubinemia  - ERCP 12/6 with post-anastomosis stricture with stent placement  - AST/ALT normal. Tbili/alkphos remain elevated  - Liver US 1/8 showed 3.9cm complex fluid collection anterior to right lobe and moderate nonspecific complex ascites inferior to transplant.   - IR placed drain 1/11/18, cell count negative for infection.  - As T bili remains elevated and pt with previous stricture, he may need a repeat ERCP in near future.   - Currently 5 weeks out from previous ERCP. Will discuss case with PBS Monday.         Long-term use of immunosuppressant medication    - Maintenance IS with cyclosporine. MMF on hold for infections and neutropenia. cont to check prograf level daily. Assess for toxicity and adjust level as needed        At risk for opportunistic infections    - hold valcyte as WBC low.  CMV PCR pending.  - Continue Bactrim for PCP proph  - isavu for fungal prophylaxis 2/2 complicated post-op course        Other ascites    - Paracentesis performed 1/11/18 with 1600 ml removed.    - fluid negative for infection.           Severe protein-calorie malnutrition    - consult dietician          Neutropenia    -  1/11/18.    - Infectious work up negative so far.   - Cont with BS antibxs as with fevers on admit.   - CMV PCR pending. Monitor.        Nausea and vomiting    - h/o constipation, reports having regular BM's with bowel regimen  - vomiting episode x 1 at home. Anti-emetics ordered PRN.  - KUB reviewed.        Pleural effusion, right    - CXR in ER with large right pleural effusion with subjective c/o worsening SOB  - thoracentesis completed- 1200 ml removed.  SOB with significant improvement.   - Cell  count reviewed and negative for infection.         Protein-calorie malnutrition    - Poor PO intake since transplant requiring short course of TPN during previous hospital stay.   - albumin remains decreased but appetite slowly improving per pt.   - Dietary consulted. Will need to closely monitor PO intake.   - supplements also ordered.         Peritonitis    - initially diagnosed in November but has since had improved cell counts in recent para's  - IR placed drain in fluid collection and paracentesis performed 1/11/18. Both fluids reviewed and negative for infection based on cell counts.   - F/u on cultures.         Anemia of chronic disease    - H&H low this am and will plan for one unit of PRBCs with HD today for replacement.   - Trend H&H daily.         Acute renal failure with tubular necrosis    - HD resumed on previous admission. Now anuric and dialyzes M-W-F.  - Nephrology consulted. Plan for HD today.             VTE Risk Mitigation         Ordered     heparin (porcine) injection 1,000 Units  As needed (PRN)     Route:  Intra-Catheter        01/12/18 1017     heparin (porcine) injection 5,000 Units  Every 12 hours     Route:  Subcutaneous        01/12/18 0926     Medium Risk of VTE  Once      01/11/18 0351     Place sequential compression device  Until discontinued      01/11/18 0351          The patients clinical status was discussed at multidisplinary rounds, involving transplant surgery, transplant medicine, pharmacy, nursing, nutrition, and social work    Discharge Planning:  Not a candidate for d/c at this time.     Jin Perez, NP  Liver Transplant  [facility]

## 2018-01-12 NOTE — SUBJECTIVE & OBJECTIVE
Scheduled Meds:   sodium chloride 0.9%   Intravenous Once    atovaquone  1,500 mg Oral Daily    ceFEPime (MAXIPIME) IVPB  1 g Intravenous Q12H    cycloSPORINE modified (NEORAL)  75 mg Oral BID    ergocalciferol  50,000 Units Oral Q7 Days    heparin (porcine)  5,000 Units Subcutaneous Q12H    levETIRAcetam  500 mg Oral BID    levothyroxine  75 mcg Oral Before breakfast    mirtazapine  7.5 mg Oral QHS    multivitamin  1 tablet Oral Daily    pantoprazole  40 mg Oral Daily    thiamine  100 mg Oral Daily    ursodiol  300 mg Oral BID     Continuous Infusions:  PRN Meds:sodium chloride, sodium chloride 0.9%, acetaminophen, docusate sodium, heparin (porcine), HYDROmorphone, ondansetron, ondansetron, oxyCODONE, oxyCODONE, prochlorperazine, sodium chloride 0.9%    Review of Systems   Constitutional: Positive for activity change, appetite change and fever. Negative for chills and fatigue.   HENT: Negative.    Respiratory: Negative for cough, shortness of breath and wheezing.    Cardiovascular: Positive for leg swelling. Negative for chest pain.   Gastrointestinal: Positive for abdominal distention and abdominal pain. Negative for constipation, nausea and vomiting.   Genitourinary: Negative for difficulty urinating and dysuria.        Anuric   Musculoskeletal: Negative for arthralgias, back pain and myalgias.   Skin: Positive for wound (chevron incision with wound vac). Negative for color change and rash.   Allergic/Immunologic: Positive for immunocompromised state.   Neurological: Positive for weakness. Negative for dizziness, seizures and headaches.   Psychiatric/Behavioral: Negative for confusion and decreased concentration. The patient is not nervous/anxious.      Objective:     Vital Signs (Most Recent):  Temp: 98.7 °F (37.1 °C) (01/12/18 1212)  Pulse: 95 (01/12/18 1212)  Resp: 18 (01/12/18 1212)  BP: 116/61 (01/12/18 1212)  SpO2: 98 % (01/12/18 1212) Vital Signs (24h Range):  Temp:  [98.2 °F (36.8 °C)-99.6 °F  (37.6 °C)] 98.7 °F (37.1 °C)  Pulse:  [86-98] 95  Resp:  [12-20] 18  SpO2:  [95 %-98 %] 98 %  BP: ()/(55-75) 116/61     Weight: 62.3 kg (137 lb 5.6 oz)  Body mass index is 22.17 kg/m².    Intake/Output - Last 3 Shifts       01/10 0700 - 01/11 0659 01/11 0700 - 01/12 0659 01/12 0700 - 01/13 0659    P.O.  360     Other   950    IV Piggyback  100     Total Intake(mL/kg)  460 (7.4) 950 (15.2)    Urine (mL/kg/hr)  0 (0)     Emesis/NG output  0 (0)     Drains  40 (0)     Other  2800 (1.9) 3006 (7)    Stool  0 (0)     Blood  0 (0)     Total Output   2840 3006    Net   -2380 -2056           Urine Occurrence  0 x     Stool Occurrence  0 x     Emesis Occurrence  0 x           Physical Exam   Constitutional: He is oriented to person, place, and time. He appears well-developed. No distress.   Temporal and distal extremity muscle wasting   HENT:   Head: Normocephalic and atraumatic.   Mouth/Throat: No oropharyngeal exudate.   Eyes: EOM are normal. Pupils are equal, round, and reactive to light. Scleral icterus is present.   Neck: Normal range of motion. Neck supple. No JVD present. No thyromegaly present.   Cardiovascular: Normal rate, regular rhythm, normal heart sounds and intact distal pulses.    No murmur heard.  Pulmonary/Chest: Effort normal. No respiratory distress. He has decreased breath sounds in the right middle field, the right lower field and the left lower field. He has no wheezes. He exhibits no tenderness.   Diminished to RLL   Abdominal: Soft. Bowel sounds are normal. He exhibits distension and ascites. There is tenderness (mild). There is no rebound and no guarding.   Wound vac to incision   Musculoskeletal: Normal range of motion. He exhibits edema (2+ LE edema). He exhibits no tenderness.   Neurological: He is alert and oriented to person, place, and time. He has normal reflexes.   Skin: Skin is warm and dry. Capillary refill takes 2 to 3 seconds. He is not diaphoretic. No erythema.   jaundice    Psychiatric: He has a normal mood and affect. His behavior is normal. Judgment and thought content normal. His mood appears not anxious.   Nursing note and vitals reviewed.      Laboratory:  Immunosuppressants         Stop Route Frequency     cycloSPORINE modified (NEORAL) capsule 75 mg      -- Oral 2 times daily        CBC:   Recent Labs  Lab 01/12/18  0627   WBC 2.70*   RBC 2.47*   HGB 7.6*   HCT 23.6*      MCV 96   MCH 30.8   MCHC 32.2     CMP:   Recent Labs  Lab 01/12/18  0627   GLU 63*   CALCIUM 8.0*   ALBUMIN 1.5*   PROT 4.1*   *   K 4.0   CO2 25      BUN 10   CREATININE 3.6*   ALKPHOS 124   ALT <5*   AST 18   BILITOT 2.7*     Coagulation:   Recent Labs  Lab 01/11/18  0434   INR 1.2     Labs within the past 24 hours have been reviewed.    Diagnostic Results:  I have personally reviewed all pertinent imaging studies.

## 2018-01-12 NOTE — PLAN OF CARE
Problem: Patient Care Overview  Goal: Plan of Care Review  Outcome: Ongoing (interventions implemented as appropriate)  Patient tolerated hd well. 2056 removed. One unit PRBC given. Catheter flushed with NS and Heparin 1000 units/ml instilled to fill volume of each port. Catheter clamped, capped, and taped. Patient without complaint.

## 2018-01-12 NOTE — ASSESSMENT & PLAN NOTE
- Poor PO intake since transplant requiring short course of TPN during previous hospital stay.   - albumin remains decreased but appetite slowly improving per pt.   - Dietary consulted. Will need to closely monitor PO intake.   - supplements also ordered.

## 2018-01-12 NOTE — CONSULTS
"  [facility]  Adult Nutrition  Consult Note    SUMMARY     Recommendations    Recommendation/Intervention: Weight has dropped significantly since last admit. Calorie count initiated - communicated with RN and patient's mother. Pt has severe physical signs of malnutrition - hollowing temples, protruding scapula and posterior ribs. Dietary recall indicates pt has been eating ~25% of EEN and EPN at home - NO intake thus far this admission. Consider appetite stimulant.     Recommend nutrition support - TF prefereable - due to continued weight loss and inability to consume adequate food. If unable to place tube at this time, TPN may be warranted.     Wt Readings from Last 20 Encounters:   01/12/18 62.3 kg (137 lb 5.6 oz)   01/09/18 73.6 kg (162 lb 4.1 oz)   12/21/17 72 kg (158 lb 11.7 oz)   12/20/17 74.7 kg (164 lb 10.9 oz)   11/28/17 77.4 kg (170 lb 9.6 oz)   11/21/17 85 kg (187 lb 6.3 oz)   11/14/17 82.8 kg (182 lb 8.7 oz)   11/09/17 78 kg (171 lb 15.3 oz)   11/09/17 78 kg (171 lb 15.3 oz)   11/08/17 75.7 kg (166 lb 14.2 oz)   ]    Continue renal diet. Order Novasource BID.     Goals: Consume/tolerate > 85% EEN and EPN  Nutrition Goal Status: new  Communication of RD Recs: reviewed with physician    Reason for Assessment    Reason for Assessment: physician consult  Diagnosis: transplant/postoperative complications  Relevent Medical History: OLTx 10/19/2017, complicate post-op w/ LAURA ongoing, chronic malnutrition, cirrhosis 2' EtOH   Interdisciplinary Rounds: attended     General Information Comments: admitted for thoracentesis/paracentis; 2800mL removed last night; appetite and intake have not improved since last admit, spoke with mother at length about recent intake, reports ate well for a few weeks in November and returned to ~25% of "normal" intake, some days eating nothing; per mom pt refuses natasha stimulant due to possible side effects he has read about online, pt appears " "malnourished    Nutrition Discharge Planning: Adequate nutrition PO vs EN    Nutrition Prescription Ordered    Current Diet Order: Renal     Evaluation of Received Nutrients/Fluid Intake   Energy Calories Required: not meeting needs       Protein Required: not meeting needs       I/O: -2.3L since admit       Fluid Required:  (per MD)     Tolerance: not tolerating  % Intake of Estimated Energy Needs: 0 - 25 %  % Meal Intake: 0%     Nutrition/Diet History    Patient Reported Diet/Restrictions/Preferences: low salt  Typical Food/Fluid Intake: eats very little at this time  Food Preferences: No cultural or Hinduism food preferences noted  Meal/Snack Patterns: pt likes to cook, when feeling well will cook and then only eat bites  Supplemental Drinks or Food Habits:  (protein powders sometimes)  Factors Affecting Nutritional Intake: abdominal distention, altered gastrointestinal function, decreased appetite       Labs/Tests/Procedures/Meds     Pertinent Labs Reviewed: reviewed, pertinent  Pertinent Labs Comments: Na 135, Cr 3.6, GFR 21.7, glu 63, alb 1.5, TBili 2.7  Pertinent Medications Reviewed: reviewed, pertinent  Pertinent Medications Comments: ergocalciferol, MVI, pantoprazole, thiamin    Physical Findings    Overall Physical Appearance: generalized wasting, loss of muscle mass, loss of subcutaneous fat, weak     Oral/Mouth Cavity: WDL  Skin: intact    Anthropometrics    Temp: 98.7 °F (37.1 °C)     Height: 5' 6" (167.6 cm)  Weight Method: Bed Scale  Weight: 62.3 kg (137 lb 5.6 oz)  Ideal Body Weight (IBW), Male: 142 lb     % Ideal Body Weight, Male (lb): 112.68 lb     BMI (Calculated): 25.9        Usual Body Weight (UBW), k kg (2017 )     % Usual Body Weight: 86.71  % Weight Change From Usual Weight: -13.47 %        Estimated/Assessed Needs    Weight Used For Calorie Calculations: 62.3 kg (137 lb 5.6 oz)   Height (cm): 167.6 cm  Energy Calorie Requirements (kcal): 7307-4433 (30-35 kcal/kg)  Energy Need " Method: Kcal/kg      RMR (Bolivar-St. Jeor Equation): 1535.75      Weight Used For Protein Calculations: 62.3 kg (137 lb 5.6 oz)  Protein Requirements: 81-94g (1.3-1.5 g/kg)    Fluid Requirements (mL): 1mL/kcal  Fluid Need Methd: RDA Method      RDA Method (mL): 2869             Assessment and Plan    Severe protein-calorie malnutrition    Nutrition Diagnosis  Inadequate energy intake    Related to (etiology):   Decreased appetite    Signs and Symptoms (as evidenced by):   Pt meeting ~25% of needs at most, hollowing temples, protruding clavicle, visible scapula     Interventions/Recommendations (treatment strategy):  See recs    Nutrition Diagnosis Status:   New              Monitor and Evaluation    Food and Nutrient Intake: energy intake, food and beverage intake  Food and Nutrient Adminstration: diet order  Knowledge/Beliefs/Attitudes: food and nutrition knowledge/skill  Physical Activity and Function: nutrition-related ADLs and IADLs  Anthropometric Measurements: weight, weight change, body mass index  Biochemical Data, Medical Tests and Procedures: electrolyte and renal panel, lipid profile, gastrointestinal profile, glucose/endocrine profile, inflammatory profile  Nutrition-Focused Physical Findings: overall appearance    Nutrition Risk    Level of Risk:  (2x/week)    Nutrition Follow-Up

## 2018-01-12 NOTE — PROGRESS NOTES
Consulted to see for wound vac dressing change   Transverse abdominal wound is 75% up to skin surface and 25% of wound is < 0.5cm depth . Base has filled in and there is no appreciable tract or tunnel . Would recommend discontinuation of NPWT at this time and utilize a topical wound dressing. Discussed recommendation with Jin Perez NP who is agreeable and we will begin Aquacel AG and Mepilex border dressing to site twice weekly.     01/12/18 1200       Incision/Site 10/30/17 0911 abdomen transverse   Date First Assessed/Time First Assessed: 10/30/17 0911   Present Prior to Hospital Arrival?: Yes  Location: abdomen  Orientation: transverse   Incision WDL ex   Dressing Appearance dry;intact   Appearance moist;pink;granulating;other (see comments)   Periwound Area normal skin tone   Drainage Characteristics/Odor serous;no odor   Drainage Amount scant   Wound Length (cm) 2.2   Wound Width (cm) 42   Depth (cm) 0.3   Wound Edges open   Cleansed W/ sterile normal saline   Dressing hydrofiber;Ag dressings;foam     Recommendations:  1. Clean wound with saline  2. Apply Aquacel AG to wound base   3. Cover with Mepilex border dressing    4. Change Tuesday and Friday of each week and reassess    Zoë Murphy RN CWON  [phone]

## 2018-01-12 NOTE — ASSESSMENT & PLAN NOTE
- H&H low this am and will plan for one unit of PRBCs with HD today for replacement.   - Trend H&H daily.

## 2018-01-12 NOTE — ASSESSMENT & PLAN NOTE
- CXR in ER with large right pleural effusion with subjective c/o worsening SOB  - thoracentesis completed- 1200 ml removed.  SOB with significant improvement.   - Cell count reviewed and negative for infection.

## 2018-01-12 NOTE — CONSULTS
[facility]  Nephrology  Consult Note    Patient Name: Jhonny Diana  MRN: [id number]  Admission Date: 1/11/2018  Hospital Length of Stay: 0 days  Attending Provider: Nathanael Medina MD   Primary Care Physician: Primary Doctor No  Principal Problem:Fever    Inpatient consult to Nephrology  Consult performed by: ANNA TAN  Consult ordered by: JOSÉ MIGUEL DURAN        Subjective:     HPI: Jhonny Diana is a 26 y/o  male with PMHx relevant for ETOH cirrhosis.  S/p DDLT 10/19/2017; c/b seizures (swtiched off prograf to cyclo), ATN dialysis dependant in iHD MWF, last HD on 1/10, anuric, superficial wound infection s/p wound vac to chevron incision, and multiple admissions for fevers finally dx with peritonitis in November (WBC 5000, 75% PNM) neg for bile leak. HE gets recurrent paracentesis and has peritoneal drain. Other pertinent PMH current wound vac in place 2/2 wound infection, malnutrition requiring TPN for short course and ongoing hypoalbuminemia, and seizure activity while on prograf and has since been switched to cyclosporine without reoccurrence.  He is admitted to LTx service secondary to fever, abdominal pain, and N/V. He reports fever (103) for 1 day prior. Overnight, he developed N/V, reports small amount of green emesis with new left sided pain. He also endorses worsening SOB with exertion. He was scheduled as an outpatient for follow up paracentesis and IR drainage of fluid collection. CXR in ER shows large pleural effusion with subsegmental atelectasis. His ANC is 900. Broad spectrum antibiotics initiated in ED. Nephrology consulted for LAURA dialysis dependant co management. He dialyses at [facility] under the care of Dr. Peterson via [facility] rowe.      Past Medical History:   Diagnosis Date    Alcoholic hepatitis with ascites     Alcoholic hepatitis with ascites     History of hematemesis 9/28/2017    Hypertension     Renal disorder        Past Surgical History:   Procedure  Laterality Date    APPENDECTOMY         Review of patient's allergies indicates:   Allergen Reactions    Bactrim [sulfamethoxazole-trimethoprim] Other (See Comments)     Mookie Trell Syndrome     Current Facility-Administered Medications   Medication Frequency    acetaminophen tablet 650 mg Q8H PRN    atovaquone suspension 1,500 mg Daily    cefepime 1g in dextrose 5% 50 mL IVPB (ready to mix system) Q12H    cycloSPORINE modified (NEORAL) capsule 75 mg BID    docusate sodium capsule 100 mg TID PRN    HYDROmorphone injection 0.5 mg On Call Procedure    levETIRAcetam tablet 500 mg BID    levothyroxine tablet 75 mcg Before breakfast    multivitamin tablet 1 tablet Daily    ondansetron disintegrating tablet 8 mg Q6H PRN    ondansetron injection 4 mg Q6H PRN    oxyCODONE immediate release tablet 10 mg Q6H PRN    oxyCODONE immediate release tablet 5 mg Q6H PRN    pantoprazole EC tablet 40 mg Daily    prochlorperazine injection Soln 10 mg Q6H PRN    sodium chloride 0.9% flush 3 mL PRN    thiamine tablet 100 mg Daily    ursodiol capsule 300 mg BID     Family History     None        Social History Main Topics    Smoking status: Former Smoker     Types: Cigarettes    Smokeless tobacco: Never Used    Alcohol use No      Comment: a fifth of liquor daily for years, cut back over last 2 months    Drug use: No    Sexual activity: Not on file     Review of Systems   Constitutional: Positive for activity change, chills, fatigue and fever. Negative for appetite change and unexpected weight change.   HENT: Negative for facial swelling, hearing loss and tinnitus.    Eyes: Negative for visual disturbance.   Respiratory: Negative for chest tightness and shortness of breath.    Cardiovascular: Negative for chest pain and leg swelling.   Gastrointestinal: Positive for nausea and vomiting. Negative for abdominal pain and diarrhea.   Genitourinary: Negative for difficulty urinating, dysuria and flank pain.    Musculoskeletal: Negative for arthralgias and myalgias.   Skin: Negative for color change.   Neurological: Negative for dizziness, syncope, weakness, light-headedness and headaches.   Psychiatric/Behavioral: Negative for behavioral problems and confusion.     Objective:     Vital Signs (Most Recent):  Temp: 98.9 °F (37.2 °C) (01/11/18 0737)  Pulse: 90 (01/11/18 1451)  Resp: 17 (01/11/18 1451)  BP: (!) 116/59 (01/11/18 1451)  SpO2: 97 % (01/11/18 1451)  O2 Device (Oxygen Therapy): room air (01/11/18 1451) Vital Signs (24h Range):  Temp:  [98.5 °F (36.9 °C)-99.3 °F (37.4 °C)] 98.9 °F (37.2 °C)  Pulse:  [] 90  Resp:  [12-22] 17  SpO2:  [95 %-100 %] 97 %  BP: ()/(25-61) 116/59     Weight: 72.6 kg (160 lb) (01/11/18 0037)  Body mass index is 25.82 kg/m².  Body surface area is 1.84 meters squared.    No intake/output data recorded.    Physical Exam   Constitutional: He is oriented to person, place, and time. He appears well-developed. No distress.   HENT:   Head: Normocephalic and atraumatic.   Eyes: Conjunctivae are normal. Pupils are equal, round, and reactive to light.   Neck: Trachea normal. Neck supple. No JVD present.   Cardiovascular: Normal rate, regular rhythm, S1 normal, S2 normal, normal heart sounds, intact distal pulses and normal pulses.  Exam reveals no gallop and no friction rub.    No murmur heard.  Pulmonary/Chest: Effort normal and breath sounds normal.   Abdominal: Soft. Bowel sounds are normal. He exhibits distension, fluid wave and ascites.   Musculoskeletal: Normal range of motion. He exhibits edema (1+ pitting edema).   Neurological: He is alert and oriented to person, place, and time.   Skin: Skin is warm and dry. Capillary refill takes less than 2 seconds.   Psychiatric: He has a normal mood and affect. His behavior is normal.   Vitals reviewed.      Significant Labs:  BMP:   Recent Labs  Lab 01/11/18  0434   GLU 89      CO2 28   BUN 5*   CREATININE 2.4*   CALCIUM 8.1*   MG  1.7     Cardiac Markers: No results for input(s): CKMB, TROPONINT, MYOGLOBIN in the last 168 hours.  CBC:   Recent Labs  Lab 01/11/18  0434   WBC 2.81*   RBC 2.48*   HGB 7.6*   HCT 23.9*      MCV 96   MCH 30.6   MCHC 31.8*     CMP:   Recent Labs  Lab 01/11/18  0434   GLU 89   CALCIUM 8.1*   ALBUMIN 1.8*   PROT 4.7*      K 3.1*   CO2 28      BUN 5*   CREATININE 2.4*   ALKPHOS 144*   ALT <5*   AST 21   BILITOT 2.6*     No results for input(s): COLORU, CLARITYU, SPECGRAV, PHUR, PROTEINUA, GLUCOSEU, BILIRUBINCON, BLOODU, WBCU, RBCU, BACTERIA, MUCUS, NITRITE, LEUKOCYTESUR, UROBILINOGEN, HYALINECASTS in the last 168 hours.  All labs within the past 24 hours have been reviewed.    Significant Imaging:  Labs: Reviewed    Assessment/Plan:     Acute renal failure with tubular necrosis    LAURA dialysisi dependant since 1016/2017  HD on TTS [facility] under the cre of Dr. Peterson  On HD for: 2.5 mo since 10/16/2017  Duration of outpatient dialysis session - 3.5 hrs  EDW - TBD  Residual Renal Function - yes   - Will provide dialysis for metabolic clearance and volume management in [facility]              Thank you for your consult. I will follow-up with patient. Please contact us if you have any additional questions.    Krish Frausto MD  Nephrology  [facility]

## 2018-01-12 NOTE — NURSING
Notified HD nursing that patient is to receive 1 unit PRBC while getting HD treatment.  NP downstairs to get consent at this time.

## 2018-01-12 NOTE — ASSESSMENT & PLAN NOTE
- unclear source of fever, previously thought to be related to peritonitis  - blood cultures prelim NGTD. No urine cx obtained as pt anuric.   - Cont broad spectrum antibiotics for now as per ID recs.   - Pt underwent thoracentesis, paracentesis, and drain placement into fluid collection 1/11/18.   - All fluids reviewed and negative for infection. F/u on cultures.

## 2018-01-12 NOTE — ASSESSMENT & PLAN NOTE
- h/o constipation, reports having regular BM's with bowel regimen  - vomiting episode x 1 at home. Anti-emetics ordered PRN.  - KUB reviewed.

## 2018-01-12 NOTE — ASSESSMENT & PLAN NOTE
- initially diagnosed in November but has since had improved cell counts in recent para's  - IR placed drain in fluid collection and paracentesis performed 1/11/18. Both fluids reviewed and negative for infection based on cell counts.   - F/u on cultures.

## 2018-01-12 NOTE — SUBJECTIVE & OBJECTIVE
Interval History: Afebrile overnight. S/p IR para/thora/abscess drainage. Currently on cefepime. Blcx remain NGTD. Cultures pending.     Review of Systems   Constitutional: Negative for fever.   Cardiovascular: Positive for leg swelling.   Gastrointestinal: Positive for abdominal pain. Negative for diarrhea, nausea and vomiting.   Genitourinary:        Anuric     Allergic/Immunologic: Positive for immunocompromised state.     Objective:     Vital Signs (Most Recent):  Temp: 98.8 °F (37.1 °C) (01/12/18 0720)  Pulse: 88 (01/12/18 0945)  Resp: 16 (01/12/18 0720)  BP: 116/66 (01/12/18 0945)  SpO2: 96 % (01/12/18 0401) Vital Signs (24h Range):  Temp:  [98.4 °F (36.9 °C)-99.6 °F (37.6 °C)] 98.8 °F (37.1 °C)  Pulse:  [83-98] 88  Resp:  [12-20] 16  SpO2:  [95 %-100 %] 96 %  BP: ()/(25-72) 116/66     Weight: 62.3 kg (137 lb 5.6 oz)  Body mass index is 22.17 kg/m².    Estimated Creatinine Clearance: 26.9 mL/min (based on SCr of 3.6 mg/dL (H)).    Physical Exam   Constitutional: No distress.   Cardiovascular: Normal rate and regular rhythm.    Pulmonary/Chest: Effort normal.   Chest tube present   Abdominal: Soft. Bowel sounds are normal.   Musculoskeletal: He exhibits edema.   Lymphadenopathy:     He has no cervical adenopathy.   Neurological:   Sleepy     Skin: Skin is warm and dry.       Significant Labs:   Blood Culture:   Recent Labs  Lab 12/03/17  0119 12/09/17  0830 12/20/17  0934 01/11/18  0159 01/11/18  0205   LABBLOO No growth after 5 days. No growth after 5 days.  No growth after 5 days. No growth after 5 days.  No growth after 5 days. No Growth to date  No Growth to date No Growth to date  No Growth to date     CBC:   Recent Labs  Lab 01/11/18  0143 01/11/18  0434 01/12/18  0627   WBC 2.94* 2.81* 2.70*   HGB 7.8* 7.6* 7.6*   HCT 24.0* 23.9* 23.6*    205 234     CMP:   Recent Labs  Lab 01/11/18  0143 01/11/18  0434 01/12/18  0627    137 135*   K 3.7 3.1* 4.0    102 103   CO2 27 28 25    GLU 80 89 63*   BUN 4* 5* 10   CREATININE 2.4* 2.4* 3.6*   CALCIUM 8.2* 8.1* 8.0*   PROT 4.9* 4.7* 4.1*   ALBUMIN 1.8* 1.8* 1.5*   BILITOT 2.7* 2.6* 2.7*   ALKPHOS 153* 144* 124   AST 22 21 18   ALT <5* <5* <5*   ANIONGAP 10 7* 7*   EGFRNONAA 35.4* 35.4* 21.7*     Fungus Culture (Blood or Bone Marrow): No results for input(s): FUNGUSCULTUR in the last 4320 hours.  Wound Culture:   Recent Labs  Lab 12/05/17  1657 12/11/17  1447 12/13/17  0834 12/18/17  1118 01/11/18  1308   LABAERO No growth No growth No growth No growth No growth       Significant Imaging: I have reviewed all pertinent imaging results/findings within the past 24 hours.

## 2018-01-12 NOTE — ASSESSMENT & PLAN NOTE
26 y/o male with hx of asthma and alcoholic cirrhosis s/p DDLT 10/19/2017 (c/b HE, EV, portal HTN, and HRS on HD; CMV D+/R+, steroid induction, on maintenance cyclosporine/MMF; c/b seizures 2/2 to tacro, superificial wound infection [facility], multiple episodes of peritonitis (most recently in 12/2017 suspected 2/2 to biliary leakage, found to have biliary stricture on ERCP 12/6 s/p sphincterotomy/biliary stent placement in CBD and abdominal drain placement 12/13/2017 s/p treatment with flagyl/cipro, s/p multiple paracenteses as well as abscess drainage of perihepatic fluid collections and treated with antimicrobial therapy but no positive cultures ) admitted 1/11/18 for 1 day hx of objective fever of 103, nausea, 1 episode of green emesis and L sided abdo pain. Pt had planned para/abscess drainage scheduled for 1/11 for a 3.9 complex fluid collection anterior to R lobe found on liver U/S on 1/4. Workup revealing R pleural effusion on CXR.     S/p IR para/thora/perihepatic abscess drainage. Ascitic fluid without infection. Pleural fluid studies c/w exudate process. Gram stain negative. Cultures remain in process. Blcx NGTD. Currently on cefepime. CMV in process.       Recommendations:   -will follow up cultures  -will order viral PCR and respiratory culture/gram stain  -would continue empiric cefepime until cultures result

## 2018-01-12 NOTE — PLAN OF CARE
Problem: Patient Care Overview  Goal: Plan of Care Review  Outcome: Ongoing (interventions implemented as appropriate)  Pt AAOx4, VSS, in NAD throughout shift.  Pt had HD today, tolerated well.  Wound vac removed from chevron today, pt tolerated well.  Pain medication adjusted to better control pt's pain, pt reported improved relief, see MAR.  RN counting pt's calories as requested per dietician.  Pt in good spirits.  Serosanguineous drainage observed from bulb drain, minimal output, see flowsheets.  RN maintaining fall risk precautions throughout shift.  Pt denies pain or other need at this time; RN will continue to monitor, assess, and alter plan of care as needed throughout shift.

## 2018-01-12 NOTE — PROGRESS NOTES
I personally saw and examined the patient on hemodialysis, tolerating treatment, see hemodyalisis flowsheet. I also reviewed the chart and current medication. The dialysis bath was adjusted.   Target UF 1-2 liter as tolerated. Had paracentesis yd. Patient should be on epo/Mircera for his anemia.

## 2018-01-12 NOTE — PLAN OF CARE
Problem: Nutrition, Imbalanced: Inadequate Oral Intake (Adult)  Intervention: Promote/Optimize Nutrition  Recommendations    Recommendation/Intervention: Weight has dropped significantly since last admit. Calorie count initiated - communicated with RN and patient's mother. Pt has severe physical signs of malnutrition - hollowing temples, protruding scapula and posterior ribs. Dietary recall indicates pt has been eating ~25% of EEN and EPN at home - NO intake thus far this admission. Consider appetite stimulant.     Recommend nutrition support - TF prefereable - due to continued weight loss and inability to consume adequate food. If unable to place tube at this time, TPN may be warranted.     Wt Readings from Last 20 Encounters:   01/12/18 62.3 kg (137 lb 5.6 oz)   01/09/18 73.6 kg (162 lb 4.1 oz)   12/21/17 72 kg (158 lb 11.7 oz)   12/20/17 74.7 kg (164 lb 10.9 oz)   11/28/17 77.4 kg (170 lb 9.6 oz)   11/21/17 85 kg (187 lb 6.3 oz)   11/14/17 82.8 kg (182 lb 8.7 oz)   11/09/17 78 kg (171 lb 15.3 oz)   11/09/17 78 kg (171 lb 15.3 oz)   11/08/17 75.7 kg (166 lb 14.2 oz)   ]    Continue renal diet. Order Novasource BID.     Goals: Consume/tolerate > 85% EEN and EPN  Nutrition Goal Status: new  Communication of RD Recs: reviewed with physician

## 2018-01-12 NOTE — ASSESSMENT & PLAN NOTE
- HD resumed on previous admission. Now anuric and dialyzes M-W-F.  - Nephrology consulted. Plan for HD today.

## 2018-01-12 NOTE — PROGRESS NOTES
[facility]  Infectious Disease  Progress Note    Patient Name: Jhonny Diana  MRN: [id number]  Admission Date: 1/11/2018  Length of Stay: 1 days  Attending Physician: Nathanael Medina MD  Primary Care Provider: Primary Doctor No    Isolation Status: No active isolations  Assessment/Plan:      * Fever    28 y/o male with hx of asthma and alcoholic cirrhosis s/p DDLT 10/19/2017 (c/b HE, EV, portal HTN, and HRS on HD; CMV D+/R+, steroid induction, on maintenance cyclosporine/MMF; c/b seizures 2/2 to tacro, superificial wound infection s/p wound vac to chevron, multiple episodes of peritonitis (most recently in 12/2017 suspected 2/2 to biliary leakage, found to have biliary stricture on ERCP 12/6 s/p sphincterotomy/biliary stent placement in CBD and abdominal drain placement 12/13/2017 s/p treatment with flagyl/cipro, s/p multiple paracenteses as well as abscess drainage of perihepatic fluid collections and treated with antimicrobial therapy but no positive cultures ) admitted 1/11/18 for 1 day hx of objective fever of 103, nausea, 1 episode of green emesis and L sided abdo pain. Pt had planned para/abscess drainage scheduled for 1/11 for a 3.9 complex fluid collection anterior to R lobe found on liver U/S on 1/4. Workup revealing R pleural effusion on CXR.     S/p IR para/thora/perihepatic abscess drainage. Ascitic fluid without infection. Pleural fluid studies c/w exudate process. Gram stain negative. Cultures remain in process. Blcx NGTD. Currently on cefepime. CMV in process.       Recommendations:   -will follow up cultures  -will order viral PCR and respiratory culture/gram stain  -would continue empiric cefepime until cultures result              Anticipated Disposition: pending    Thank you for your consult. I will follow-up with patient. Please contact us if you have any additional questions.    TED Tuttle  Infectious Disease  [facility]    Subjective:     Principal  Problem:Fever    HPI: 28 y/o male with hx of asthma and alcoholic cirrhosis s/p DDLT 10/19/2017 (c/b HE, EV, portal HTN, and HRS on HD; CMV D+/R+, steroid induction, on maintenance cyclosporine/MMF; c/b seizures 2/2 to tacro, superificial wound infection s/p wound vac to chevron, multiple episodes of peritonitis (most recently in 12/2017 suspected 2/2 to biliary leakage, found to have biliary stricture on ERCP 12/6 s/p sphincterotomy/biliary stent placement in CBD and abdominal drain placement 12/13/2017 s/p treatment with flagyl/cipro, s/p multiple paracenteses as well as abscess drainage of perihepatic fluid collections and treated with antimicrobial therapy but no positive cultures ) admitted 1/11/18 for 1 day hx of objective fever of 103, nausea, 1 episode of green emesis and L sided abdo pain. Associated sx including SOB. Denies CP, palpitations, diarrhea, constipation, or back pain. Denies any sick contacts. Transplant ID consulted for possible infection.         Interval History: Afebrile overnight. S/p IR para/thora/abscess drainage. Currently on cefepime. Blcx remain NGTD. Cultures pending.     Review of Systems   Constitutional: Negative for fever.   Cardiovascular: Positive for leg swelling.   Gastrointestinal: Positive for abdominal pain. Negative for diarrhea, nausea and vomiting.   Genitourinary:        Anuric     Allergic/Immunologic: Positive for immunocompromised state.     Objective:     Vital Signs (Most Recent):  Temp: 98.8 °F (37.1 °C) (01/12/18 0720)  Pulse: 88 (01/12/18 0945)  Resp: 16 (01/12/18 0720)  BP: 116/66 (01/12/18 0945)  SpO2: 96 % (01/12/18 0401) Vital Signs (24h Range):  Temp:  [98.4 °F (36.9 °C)-99.6 °F (37.6 °C)] 98.8 °F (37.1 °C)  Pulse:  [83-98] 88  Resp:  [12-20] 16  SpO2:  [95 %-100 %] 96 %  BP: ()/(25-72) 116/66     Weight: 62.3 kg (137 lb 5.6 oz)  Body mass index is 22.17 kg/m².    Estimated Creatinine Clearance: 26.9 mL/min (based on SCr of 3.6 mg/dL (H)).    Physical  Exam   Constitutional: No distress.   Cardiovascular: Normal rate and regular rhythm.    Pulmonary/Chest: Effort normal.   Chest tube present   Abdominal: Soft. Bowel sounds are normal.   Musculoskeletal: He exhibits edema.   Lymphadenopathy:     He has no cervical adenopathy.   Neurological:   Sleepy     Skin: Skin is warm and dry.       Significant Labs:   Blood Culture:   Recent Labs  Lab 12/03/17  0119 12/09/17  0830 12/20/17  0934 01/11/18  0159 01/11/18  0205   LABBLOO No growth after 5 days. No growth after 5 days.  No growth after 5 days. No growth after 5 days.  No growth after 5 days. No Growth to date  No Growth to date No Growth to date  No Growth to date     CBC:   Recent Labs  Lab 01/11/18  0143 01/11/18  0434 01/12/18  0627   WBC 2.94* 2.81* 2.70*   HGB 7.8* 7.6* 7.6*   HCT 24.0* 23.9* 23.6*    205 234     CMP:   Recent Labs  Lab 01/11/18  0143 01/11/18  0434 01/12/18  0627    137 135*   K 3.7 3.1* 4.0    102 103   CO2 27 28 25   GLU 80 89 63*   BUN 4* 5* 10   CREATININE 2.4* 2.4* 3.6*   CALCIUM 8.2* 8.1* 8.0*   PROT 4.9* 4.7* 4.1*   ALBUMIN 1.8* 1.8* 1.5*   BILITOT 2.7* 2.6* 2.7*   ALKPHOS 153* 144* 124   AST 22 21 18   ALT <5* <5* <5*   ANIONGAP 10 7* 7*   EGFRNONAA 35.4* 35.4* 21.7*     Fungus Culture (Blood or Bone Marrow): No results for input(s): FUNGUSCULTUR in the last 4320 hours.  Wound Culture:   Recent Labs  Lab 12/05/17  1657 12/11/17  1447 12/13/17  0834 12/18/17  1118 01/11/18  1308   LABAERO No growth No growth No growth No growth No growth       Significant Imaging: I have reviewed all pertinent imaging results/findings within the past 24 hours.

## 2018-01-13 PROBLEM — N18.6 ESRD (END STAGE RENAL DISEASE): Status: RESOLVED | Noted: 2018-01-13 | Resolved: 2018-01-13

## 2018-01-13 LAB
ALBUMIN SERPL BCP-MCNC: 1.5 G/DL
ALP SERPL-CCNC: 133 U/L
ALT SERPL W/O P-5'-P-CCNC: <5 U/L
ANION GAP SERPL CALC-SCNC: 8 MMOL/L
AST SERPL-CCNC: 19 U/L
BASOPHILS # BLD AUTO: 0.02 K/UL
BASOPHILS NFR BLD: 0.6 %
BILIRUB SERPL-MCNC: 2.7 MG/DL
BUN SERPL-MCNC: 8 MG/DL
CALCIUM SERPL-MCNC: 8.1 MG/DL
CHLORIDE SERPL-SCNC: 102 MMOL/L
CO2 SERPL-SCNC: 27 MMOL/L
CREAT SERPL-MCNC: 2.7 MG/DL
CYCLOSPORINE BLD LC/MS/MS-MCNC: 391 NG/ML
DIFFERENTIAL METHOD: ABNORMAL
EOSINOPHIL # BLD AUTO: 0.1 K/UL
EOSINOPHIL NFR BLD: 3.7 %
ERYTHROCYTE [DISTWIDTH] IN BLOOD BY AUTOMATED COUNT: 17.3 %
EST. GFR  (AFRICAN AMERICAN): 35.5 ML/MIN/1.73 M^2
EST. GFR  (NON AFRICAN AMERICAN): 30.7 ML/MIN/1.73 M^2
GLUCOSE SERPL-MCNC: 81 MG/DL
HCT VFR BLD AUTO: 27.7 %
HGB BLD-MCNC: 9.2 G/DL
IMM GRANULOCYTES # BLD AUTO: 0.02 K/UL
IMM GRANULOCYTES NFR BLD AUTO: 0.6 %
LYMPHOCYTES # BLD AUTO: 1.2 K/UL
LYMPHOCYTES NFR BLD: 36.2 %
MAGNESIUM SERPL-MCNC: 1.4 MG/DL
MCH RBC QN AUTO: 30.5 PG
MCHC RBC AUTO-ENTMCNC: 33.2 G/DL
MCV RBC AUTO: 92 FL
MONOCYTES # BLD AUTO: 1 K/UL
MONOCYTES NFR BLD: 29.4 %
NEUTROPHILS # BLD AUTO: 1 K/UL
NEUTROPHILS NFR BLD: 29.5 %
NRBC BLD-RTO: 0 /100 WBC
PLATELET # BLD AUTO: 208 K/UL
PMV BLD AUTO: 9.6 FL
POTASSIUM SERPL-SCNC: 3.8 MMOL/L
PROT SERPL-MCNC: 4.3 G/DL
RBC # BLD AUTO: 3.02 M/UL
SODIUM SERPL-SCNC: 137 MMOL/L
WBC # BLD AUTO: 3.23 K/UL

## 2018-01-13 PROCEDURE — 25000003 PHARM REV CODE 250: Performed by: NURSE PRACTITIONER

## 2018-01-13 PROCEDURE — 99233 SBSQ HOSP IP/OBS HIGH 50: CPT | Mod: ,,, | Performed by: INTERNAL MEDICINE

## 2018-01-13 PROCEDURE — 63600175 PHARM REV CODE 636 W HCPCS: Performed by: NURSE PRACTITIONER

## 2018-01-13 PROCEDURE — 20600001 HC STEP DOWN PRIVATE ROOM

## 2018-01-13 PROCEDURE — 99233 SBSQ HOSP IP/OBS HIGH 50: CPT | Mod: 24,,, | Performed by: NURSE PRACTITIONER

## 2018-01-13 PROCEDURE — 80053 COMPREHEN METABOLIC PANEL: CPT

## 2018-01-13 PROCEDURE — 80158 DRUG ASSAY CYCLOSPORINE: CPT

## 2018-01-13 PROCEDURE — 83735 ASSAY OF MAGNESIUM: CPT

## 2018-01-13 PROCEDURE — 63600175 PHARM REV CODE 636 W HCPCS: Performed by: SURGERY

## 2018-01-13 PROCEDURE — 36415 COLL VENOUS BLD VENIPUNCTURE: CPT

## 2018-01-13 PROCEDURE — 85025 COMPLETE CBC W/AUTO DIFF WBC: CPT

## 2018-01-13 PROCEDURE — 25000003 PHARM REV CODE 250: Performed by: SURGERY

## 2018-01-13 RX ORDER — MAGNESIUM SULFATE HEPTAHYDRATE 40 MG/ML
2 INJECTION, SOLUTION INTRAVENOUS ONCE
Status: COMPLETED | OUTPATIENT
Start: 2018-01-13 | End: 2018-01-13

## 2018-01-13 RX ORDER — LIDOCAINE HYDROCHLORIDE 10 MG/ML
1 INJECTION INFILTRATION; PERINEURAL ONCE
Status: COMPLETED | OUTPATIENT
Start: 2018-01-13 | End: 2018-01-13

## 2018-01-13 RX ADMIN — PANTOPRAZOLE SODIUM 40 MG: 40 TABLET, DELAYED RELEASE ORAL at 08:01

## 2018-01-13 RX ADMIN — HEPARIN SODIUM 5000 UNITS: 5000 INJECTION, SOLUTION INTRAVENOUS; SUBCUTANEOUS at 07:01

## 2018-01-13 RX ADMIN — CYCLOSPORINE 50 MG: 25 CAPSULE, LIQUID FILLED ORAL at 07:01

## 2018-01-13 RX ADMIN — OXYCODONE HYDROCHLORIDE 10 MG: 5 TABLET ORAL at 08:01

## 2018-01-13 RX ADMIN — Medication 100 MG: at 08:01

## 2018-01-13 RX ADMIN — OXYCODONE HYDROCHLORIDE 5 MG: 5 TABLET ORAL at 01:01

## 2018-01-13 RX ADMIN — HEPARIN SODIUM 5000 UNITS: 5000 INJECTION, SOLUTION INTRAVENOUS; SUBCUTANEOUS at 08:01

## 2018-01-13 RX ADMIN — CYCLOSPORINE 75 MG: 25 CAPSULE, LIQUID FILLED ORAL at 08:01

## 2018-01-13 RX ADMIN — MIRTAZAPINE 7.5 MG: 7.5 TABLET ORAL at 07:01

## 2018-01-13 RX ADMIN — ONDANSETRON HYDROCHLORIDE 4 MG: 2 INJECTION, SOLUTION INTRAMUSCULAR; INTRAVENOUS at 09:01

## 2018-01-13 RX ADMIN — CEFEPIME HYDROCHLORIDE 1 G: 1 INJECTION, SOLUTION INTRAVENOUS at 03:01

## 2018-01-13 RX ADMIN — LIDOCAINE HYDROCHLORIDE 1 ML: 10 INJECTION, SOLUTION INFILTRATION; PERINEURAL at 08:01

## 2018-01-13 RX ADMIN — MAGNESIUM SULFATE IN WATER 2 G: 40 INJECTION, SOLUTION INTRAVENOUS at 09:01

## 2018-01-13 RX ADMIN — LEVETIRACETAM 500 MG: 500 TABLET ORAL at 07:01

## 2018-01-13 RX ADMIN — CEFEPIME HYDROCHLORIDE 1 G: 1 INJECTION, SOLUTION INTRAVENOUS at 05:01

## 2018-01-13 RX ADMIN — ATOVAQUONE 1500 MG: 750 SUSPENSION ORAL at 08:01

## 2018-01-13 RX ADMIN — THERA TABS 1 TABLET: TAB at 08:01

## 2018-01-13 RX ADMIN — OXYCODONE HYDROCHLORIDE 10 MG: 5 TABLET ORAL at 11:01

## 2018-01-13 RX ADMIN — URSODIOL 300 MG: 300 CAPSULE ORAL at 07:01

## 2018-01-13 RX ADMIN — OXYCODONE HYDROCHLORIDE 10 MG: 5 TABLET ORAL at 05:01

## 2018-01-13 RX ADMIN — LEVETIRACETAM 500 MG: 500 TABLET ORAL at 08:01

## 2018-01-13 RX ADMIN — OXYCODONE HYDROCHLORIDE 10 MG: 5 TABLET ORAL at 03:01

## 2018-01-13 RX ADMIN — OXYCODONE HYDROCHLORIDE 10 MG: 5 TABLET ORAL at 07:01

## 2018-01-13 RX ADMIN — LEVOTHYROXINE SODIUM 75 MCG: 75 TABLET ORAL at 05:01

## 2018-01-13 RX ADMIN — URSODIOL 300 MG: 300 CAPSULE ORAL at 08:01

## 2018-01-13 NOTE — ASSESSMENT & PLAN NOTE
- unclear source of fever, previously thought to be related to peritonitis  - blood cultures prelim NGTD. No urine cx obtained as pt anuric.   - Cont broad spectrum antibiotics for now as per ID recs.   - Pt underwent thoracentesis, paracentesis, and drain placement into fluid collection 1/11/18.   - All fluids reviewed and negative for infection. F/u on cultures.   - T max 99.9.  Cont IV abx per ID.

## 2018-01-13 NOTE — SUBJECTIVE & OBJECTIVE
Scheduled Meds:   sodium chloride 0.9%   Intravenous Once    atovaquone  1,500 mg Oral Daily    ceFEPime (MAXIPIME) IVPB  1 g Intravenous Q12H    cycloSPORINE modified (NEORAL)  50 mg Oral BID    ergocalciferol  50,000 Units Oral Q7 Days    heparin (porcine)  5,000 Units Subcutaneous Q12H    levETIRAcetam  500 mg Oral BID    levothyroxine  75 mcg Oral Before breakfast    mirtazapine  7.5 mg Oral QHS    multivitamin  1 tablet Oral Daily    pantoprazole  40 mg Oral Daily    thiamine  100 mg Oral Daily    ursodiol  300 mg Oral BID     Continuous Infusions:  PRN Meds:sodium chloride, sodium chloride 0.9%, acetaminophen, docusate sodium, heparin (porcine), ondansetron, ondansetron, oxyCODONE, oxyCODONE, prochlorperazine, sodium chloride 0.9%    Review of Systems   Constitutional: Positive for activity change, appetite change and fever. Negative for chills and fatigue.   HENT: Negative.    Respiratory: Negative for cough, shortness of breath and wheezing.    Cardiovascular: Positive for leg swelling. Negative for chest pain.   Gastrointestinal: Positive for abdominal distention and abdominal pain. Negative for constipation, nausea and vomiting.   Genitourinary: Negative for difficulty urinating and dysuria.        Anuric   Musculoskeletal: Negative for arthralgias, back pain and myalgias.   Skin: Positive for wound (chevron incision with wound vac). Negative for color change and rash.   Allergic/Immunologic: Positive for immunocompromised state.   Neurological: Positive for weakness. Negative for dizziness, seizures and headaches.   Psychiatric/Behavioral: Negative for confusion and decreased concentration. The patient is not nervous/anxious.      Objective:     Vital Signs (Most Recent):  Temp: 99 °F (37.2 °C) (01/13/18 1151)  Pulse: 89 (01/13/18 1151)  Resp: 18 (01/13/18 1151)  BP: 110/70 (01/13/18 1151)  SpO2: 96 % (01/13/18 1151) Vital Signs (24h Range):  Temp:  [98.5 °F (36.9 °C)-99.9 °F (37.7 °C)] 99 °F  (37.2 °C)  Pulse:  [] 89  Resp:  [16-19] 18  SpO2:  [94 %-97 %] 96 %  BP: (105-118)/(57-75) 110/70     Weight: 62.3 kg (137 lb 5.6 oz)  Body mass index is 22.17 kg/m².    Intake/Output - Last 3 Shifts       01/11 0700 - 01/12 0659 01/12 0700 - 01/13 0659 01/13 0700 - 01/14 0659    P.O. 360 240     I.V. (mL/kg)  0 (0)     Other  950     IV Piggyback 100 50     Total Intake(mL/kg) 460 (7.4) 1240 (19.9)     Urine (mL/kg/hr) 0 (0) 50 (0)     Emesis/NG output 0 (0) 0 (0)     Drains 40 (0) 15 (0)     Other 2800 (1.9) 3006 (2)     Stool 0 (0) 0 (0)     Blood 0 (0) 0 (0)     Total Output 2840 3071      Net -2380 -1831             Urine Occurrence 0 x 0 x     Stool Occurrence 0 x 0 x 0 x    Emesis Occurrence 0 x 0 x           Physical Exam   Constitutional: He is oriented to person, place, and time. He appears well-developed. No distress.   Temporal and distal extremity muscle wasting   HENT:   Head: Normocephalic and atraumatic.   Mouth/Throat: No oropharyngeal exudate.   Eyes: EOM are normal. Pupils are equal, round, and reactive to light. Scleral icterus is present.   Neck: Normal range of motion. Neck supple. No JVD present. No thyromegaly present.   Cardiovascular: Normal rate, regular rhythm, normal heart sounds and intact distal pulses.    No murmur heard.  Pulmonary/Chest: Effort normal. No respiratory distress. He has decreased breath sounds in the right middle field, the right lower field and the left lower field. He has no wheezes. He exhibits no tenderness.   Diminished to RLL   Abdominal: Soft. Bowel sounds are normal. He exhibits distension and ascites. There is tenderness (mild). There is no rebound and no guarding.   Wound vac to incision   Musculoskeletal: Normal range of motion. He exhibits edema (2+ LE edema). He exhibits no tenderness.   Neurological: He is alert and oriented to person, place, and time. He has normal reflexes.   Skin: Skin is warm and dry. Capillary refill takes 2 to 3 seconds. He  is not diaphoretic. No erythema.   jaundice   Psychiatric: He has a normal mood and affect. His behavior is normal. Judgment and thought content normal. His mood appears not anxious.   Nursing note and vitals reviewed.      Laboratory:  Immunosuppressants         Stop Route Frequency     cycloSPORINE modified (NEORAL) capsule 50 mg      -- Oral 2 times daily        CBC:     Recent Labs  Lab 01/13/18  0438   WBC 3.23*   RBC 3.02*   HGB 9.2*   HCT 27.7*      MCV 92   MCH 30.5   MCHC 33.2     CMP:     Recent Labs  Lab 01/13/18  0438   GLU 81   CALCIUM 8.1*   ALBUMIN 1.5*   PROT 4.3*      K 3.8   CO2 27      BUN 8   CREATININE 2.7*   ALKPHOS 133   ALT <5*   AST 19   BILITOT 2.7*     Coagulation:     Recent Labs  Lab 01/11/18  0434   INR 1.2     Cyclosporine, LC/MS   Date Value Ref Range Status   01/13/2018 391 100 - 400 ng/mL Final     Comment:     Reference Normals:  For Kidney Transplants: 100-300 ng/mL  Testing performed by Liquid Chromatography-Tandem  Mass Spectrometry (LC-MS/MS).  This test was developed and its performance characteristics  determined by [facility], Department of Pathology  and Laboratory Medicine in a manner consistent with CLIA  requirements. It has not been cleared or approved by the [place]  Food and Drug Administration.  This test is used for clinical  purposes.  It should not be regarded as investigational or for  research.     01/12/2018 244 100 - 400 ng/mL Final     Comment:     Reference Normals:  For Kidney Transplants: 100-300 ng/mL  Testing performed by Liquid Chromatography-Tandem  Mass Spectrometry (LC-MS/MS).  This test was developed and its performance characteristics  determined by [facility], Department of Pathology  and Laboratory Medicine in a manner consistent with CLIA  requirements. It has not been cleared or approved by the [place]  Food and Drug Administration.  This test is used for clinical  purposes.  It should not be regarded as  investigational or for  research.     01/11/2018 223 100 - 400 ng/mL Final     Comment:     Reference Normals:  For Kidney Transplants: 100-300 ng/mL  Testing performed by Liquid Chromatography-Tandem  Mass Spectrometry (LC-MS/MS).  This test was developed and its performance characteristics  determined by [facility], Department of Pathology  and Laboratory Medicine in a manner consistent with CLIA  requirements. It has not been cleared or approved by the [place]  Food and Drug Administration.  This test is used for clinical  purposes.  It should not be regarded as investigational or for  research.     01/08/2018 238 100 - 400 ng/mL Final     Comment:     Reference Normals:  For Kidney Transplants: 100-300 ng/mL  Testing performed by Liquid Chromatography-Tandem  Mass Spectrometry (LC-MS/MS).  This test was developed and its performance characteristics  determined by [facility], Department of Pathology  and Laboratory Medicine in a manner consistent with CLIA  requirements. It has not been cleared or approved by the [place]  Food and Drug Administration.  This test is used for clinical  purposes.  It should not be regarded as investigational or for  research.         Labs within the past 24 hours have been reviewed.    Diagnostic Results:  I have personally reviewed all pertinent imaging studies.

## 2018-01-13 NOTE — ASSESSMENT & PLAN NOTE
- CXR in ER with large right pleural effusion with subjective c/o worsening SOB  - thoracentesis completed- 1200 ml removed.  SOB with significant improvement.   - Cell count reviewed and negative for infection.   - resp even and non labored.  O2 sat 94-98%.

## 2018-01-13 NOTE — SUBJECTIVE & OBJECTIVE
Interval History: Doing better today overall. Drain out of abdomen. Wound vac off. Still with some RLQ abdominal pain and nausea. Afebrile.    Review of Systems   Constitutional: Negative for fever.   Cardiovascular: Positive for leg swelling.   Gastrointestinal: Positive for abdominal pain. Negative for diarrhea, nausea and vomiting.   Genitourinary:        Anuric     Allergic/Immunologic: Positive for immunocompromised state.     Objective:     Vital Signs (Most Recent):  Temp: 99 °F (37.2 °C) (01/13/18 1151)  Pulse: 89 (01/13/18 1151)  Resp: 18 (01/13/18 1151)  BP: 110/70 (01/13/18 1151)  SpO2: 96 % (01/13/18 1151) Vital Signs (24h Range):  Temp:  [98.5 °F (36.9 °C)-99.9 °F (37.7 °C)] 99 °F (37.2 °C)  Pulse:  [] 89  Resp:  [16-19] 18  SpO2:  [94 %-97 %] 96 %  BP: (105-118)/(57-75) 110/70     Weight: 62.3 kg (137 lb 5.6 oz)  Body mass index is 22.17 kg/m².    Estimated Creatinine Clearance: 35.9 mL/min (based on SCr of 2.7 mg/dL (H)).    Physical Exam   Constitutional: He is oriented to person, place, and time. He appears well-developed. No distress.   Temporal and distal extremity muscle wasting   HENT:   Head: Normocephalic and atraumatic.   Mouth/Throat: No oropharyngeal exudate.   Eyes: EOM are normal. Pupils are equal, round, and reactive to light. Scleral icterus is present.   Neck: Normal range of motion. Neck supple. No JVD present. No thyromegaly present.   Cardiovascular: Normal rate, regular rhythm, normal heart sounds and intact distal pulses.    No murmur heard.  Pulmonary/Chest: Effort normal. No respiratory distress. He has decreased breath sounds in the right middle field, the right lower field and the left lower field. He has no wheezes. He exhibits no tenderness.   Diminished to RLL   Abdominal: Soft. Bowel sounds are normal. He exhibits distension and ascites. There is tenderness (mild). There is no rebound and no guarding.   Wound vac removed.   Musculoskeletal: Normal range of motion. He  exhibits edema (2+ LE edema). He exhibits no tenderness.   Neurological: He is alert and oriented to person, place, and time. He has normal reflexes.   Skin: Skin is warm and dry. Capillary refill takes 2 to 3 seconds. He is not diaphoretic. No erythema.   jaundice   Psychiatric: He has a normal mood and affect. His behavior is normal. Judgment and thought content normal. His mood appears not anxious.   Nursing note and vitals reviewed.      Significant Labs:   CBC:   Recent Labs  Lab 01/12/18 0627 01/13/18 0438   WBC 2.70* 3.23*   HGB 7.6* 9.2*   HCT 23.6* 27.7*    208     CMP:   Recent Labs  Lab 01/12/18 0627 01/13/18  0438   * 137   K 4.0 3.8    102   CO2 25 27   GLU 63* 81   BUN 10 8   CREATININE 3.6* 2.7*   CALCIUM 8.0* 8.1*   PROT 4.1* 4.3*   ALBUMIN 1.5* 1.5*   BILITOT 2.7* 2.7*   ALKPHOS 124 133   AST 18 19   ALT <5* <5*   ANIONGAP 7* 8   EGFRNONAA 21.7* 30.7*       Significant Imaging: I have reviewed all pertinent imaging results/findings within the past 24 hours.       Microbiology:  1/11 blood cx: negative  1/11 pleural fluid cx: negative  1/11 ascites cx; negative

## 2018-01-13 NOTE — PLAN OF CARE
Problem: Patient Care Overview  Goal: Plan of Care Review  Outcome: Ongoing (interventions implemented as appropriate)  Pt is AAOx4, free from fall/injury so far this shift, in bed wearing non-skid footwear, bed in low/locked position, commode at bedside, call bell next to pt, mother at bedside. Pt VSS, pain managed c ordered prn oxy PO. Pt is afebrile at this time. Proper hand hygiene performed before and after pt care activities. Pt has cefepime q12 2/2 abd abscess, drain c grenade to RLQ. Abd chevron covered c mepilex, wound vac d/c'd on day shift yd, packing c aquacel ag and covering c mepilex, change q tue/thurs. Pt received unit of blood on day shift while down in HD, pt on MWF schedule, took of 2L yd on day shift. Pt access is RS tunnel cath, dressing CDI. Pt will possibly need repeat ERCP 2/2 t.bili mildly continuously elevated, LTS will discuss c PBS on Mon. Pt reminded to use call bell to call for assistance, pt verbalizes understanding. Will continue to monitor.

## 2018-01-13 NOTE — PROGRESS NOTES
[facility]  Infectious Disease  Progress Note    Patient Name: Jhonny Diana  MRN: [id number]  Admission Date: 1/11/2018  Length of Stay: 2 days  Attending Physician: Nathanael Medina MD  Primary Care Provider: Primary Doctor No    Isolation Status: No active isolations  Assessment/Plan:      * Fever    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, CMV D+/R+, steroid induction, on maintenance tacro/MMF/pred; c/b seizures, LAURA, superificial wound infection s/p wound vac to [facility] through 1/12, and several recent admissions on 11/24 and 12/3 with culture negative peritonitis with peak WBC ~5k due to suspected indolent biliary leakage s/p sphincterotomy/biliary stent placement over CBD stricture on 12/6 ERCP although notably with no overt leak noted during procedure) who was admitted on 1/11/2017 with acute onset fevers and acute on chronic N/V/abdominal pain (RLQ worst) in the setting of persistent noninflammatory ascites of unknown etiology. He underwent drainage of pleural fluid and ascites that did not show evidence of gross infection but hist fevers have abated and he has shown modest clinical improvement on empiric cefepime in house. Differential for fevers and chronic N/V/abdominal pain include: indolent bile leak (with still abnormal LFT's and known stent in place), walled off abdominal abscess (not yet accessed by peritoneal drain/blind tap as ascites counts noninflammatory), CMV infection (as valcyte on hold), or another OI (as he is now almost 3mos post-transplant). He is slightly improved on empiric coverage in house.    - would continue empiric cefepime for now and observe fever curve, symptoms, LFT's on therapy  - await pending CMV quant  - may need additional evaluation of biliary system by ERCP if LFT's not normalized and symptoms persist despite the above            Anticipated Disposition: pending improvement    Thank you  for your consult. I will follow-up with patient. Please contact us if you have any additional questions.     Melanie Bergman MD  Transplant ID Attending  [id number]    Melanie Bergman MD  Infectious Disease  [facility]    Subjective:     Principal Problem:Fever    HPI: No notes on file  Interval History: Doing better today overall. Drain out of abdomen. Wound vac off. Still with some RLQ abdominal pain and nausea. Afebrile.    Review of Systems   Constitutional: Negative for fever.   Cardiovascular: Positive for leg swelling.   Gastrointestinal: Positive for abdominal pain. Negative for diarrhea, nausea and vomiting.   Genitourinary:        Anuric     Allergic/Immunologic: Positive for immunocompromised state.     Objective:     Vital Signs (Most Recent):  Temp: 99 °F (37.2 °C) (01/13/18 1151)  Pulse: 89 (01/13/18 1151)  Resp: 18 (01/13/18 1151)  BP: 110/70 (01/13/18 1151)  SpO2: 96 % (01/13/18 1151) Vital Signs (24h Range):  Temp:  [98.5 °F (36.9 °C)-99.9 °F (37.7 °C)] 99 °F (37.2 °C)  Pulse:  [] 89  Resp:  [16-19] 18  SpO2:  [94 %-97 %] 96 %  BP: (105-118)/(57-75) 110/70     Weight: 62.3 kg (137 lb 5.6 oz)  Body mass index is 22.17 kg/m².    Estimated Creatinine Clearance: 35.9 mL/min (based on SCr of 2.7 mg/dL (H)).    Physical Exam   Constitutional: He is oriented to person, place, and time. He appears well-developed. No distress.   Temporal and distal extremity muscle wasting   HENT:   Head: Normocephalic and atraumatic.   Mouth/Throat: No oropharyngeal exudate.   Eyes: EOM are normal. Pupils are equal, round, and reactive to light. Scleral icterus is present.   Neck: Normal range of motion. Neck supple. No JVD present. No thyromegaly present.   Cardiovascular: Normal rate, regular rhythm, normal heart sounds and intact distal pulses.    No murmur heard.  Pulmonary/Chest: Effort normal. No respiratory distress. He has decreased breath sounds in the right middle field, the right lower  field and the left lower field. He has no wheezes. He exhibits no tenderness.   Diminished to RLL   Abdominal: Soft. Bowel sounds are normal. He exhibits distension and ascites. There is tenderness (mild). There is no rebound and no guarding.   Wound vac removed.   Musculoskeletal: Normal range of motion. He exhibits edema (2+ LE edema). He exhibits no tenderness.   Neurological: He is alert and oriented to person, place, and time. He has normal reflexes.   Skin: Skin is warm and dry. Capillary refill takes 2 to 3 seconds. He is not diaphoretic. No erythema.   jaundice   Psychiatric: He has a normal mood and affect. His behavior is normal. Judgment and thought content normal. His mood appears not anxious.   Nursing note and vitals reviewed.      Significant Labs:   CBC:   Recent Labs  Lab 01/12/18 0627 01/13/18 0438   WBC 2.70* 3.23*   HGB 7.6* 9.2*   HCT 23.6* 27.7*    208     CMP:   Recent Labs  Lab 01/12/18 0627 01/13/18 0438   * 137   K 4.0 3.8    102   CO2 25 27   GLU 63* 81   BUN 10 8   CREATININE 3.6* 2.7*   CALCIUM 8.0* 8.1*   PROT 4.1* 4.3*   ALBUMIN 1.5* 1.5*   BILITOT 2.7* 2.7*   ALKPHOS 124 133   AST 18 19   ALT <5* <5*   ANIONGAP 7* 8   EGFRNONAA 21.7* 30.7*       Significant Imaging: I have reviewed all pertinent imaging results/findings within the past 24 hours.       Microbiology:  1/11 blood cx: negative  1/11 pleural fluid cx: negative  1/11 ascites cx; negative

## 2018-01-13 NOTE — PLAN OF CARE
Problem: Patient Care Overview  Goal: Plan of Care Review  Outcome: Ongoing (interventions implemented as appropriate)  Pt admitted for fever as well as nausea and vomiting. Pt had IRISH drain removed this AM. Diet changed to clear liquid to allow for GI rest. Pt with very poor appetite. Pt complained of severe pain to his abdomen. Mother at bedside throughout shift. Pt free from falls and injury.

## 2018-01-13 NOTE — ASSESSMENT & PLAN NOTE
- HD resumed on previous admission. Now anuric and dialyzes M-W-F.  - Nephrology following.  Tolerated last HD 1/12/18.

## 2018-01-13 NOTE — ASSESSMENT & PLAN NOTE
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, CMV D+/R+, steroid induction, on maintenance tacro/MMF/pred; c/b seizures, LAURA, superificial wound infection s/p wound vac to [facility] through 1/12, and several recent admissions on 11/24 and 12/3 with culture negative peritonitis with peak WBC ~5k due to suspected indolent biliary leakage s/p sphincterotomy/biliary stent placement over CBD stricture on 12/6 ERCP although notably with no overt leak noted during procedure) who was admitted on 1/11/2017 with acute onset fevers and acute on chronic N/V/abdominal pain (RLQ worst) in the setting of persistent noninflammatory ascites of unknown etiology. He underwent drainage of pleural fluid and ascites that did not show evidence of gross infection but hist fevers have abated and he has shown modest clinical improvement on empiric cefepime in house. Differential for fevers and chronic N/V/abdominal pain include: indolent bile leak (with still abnormal LFT's and known stent in place), walled off abdominal abscess (not yet accessed by peritoneal drain/blind tap as ascites counts noninflammatory), CMV infection (as valcyte on hold), or another OI (as he is now almost 3mos post-transplant). He is slightly improved on empiric coverage in house.    - would continue empiric cefepime for now and observe fever curve, symptoms, LFT's on therapy  - await pending CMV quant  - may need additional evaluation of biliary system by ERCP if LFT's not normalized and symptoms persist despite the above

## 2018-01-13 NOTE — ASSESSMENT & PLAN NOTE
- h/o constipation, reports having regular BM's with bowel regimen  - vomiting episode x 1 at home. Anti-emetics ordered PRN.  - KUB repeated today given increased abdominal pain- mild, generalized bowel distention suggesting ileus.  Diet changed to clears as tolerated for bowel rest  IF nausea/vomiting, recommend NGT.

## 2018-01-13 NOTE — ASSESSMENT & PLAN NOTE
- post op course complicated by fevers and hyperbilirubinemia  - ERCP 12/6 with post-anastomosis stricture with stent placement  - AST/ALT normal. Tbili/alkphos remain elevated  - Liver US 1/8 showed 3.9cm complex fluid collection anterior to right lobe and moderate nonspecific complex ascites inferior to transplant.   - IR placed drain 1/11/18, cell count negative for infection.  - As T bili remains elevated and pt with previous stricture, he may need a repeat ERCP in near future.   - Currently 5 weeks out from previous ERCP. Will discuss case with PBS Monday.

## 2018-01-13 NOTE — PROGRESS NOTES
Pt c/o gen abd pain and drain site pain, rating 9/10. Pt given given ordered prn oxy IR 10mg PO about one hour ago, but is still c/o severe abd pain. This RN called LTS resident, spoke julienne Lee MD and explained that pt was given prn pain meds about an hour ago for gen abd pain. This RN explained that pt has abd wound where wound vac was removed on day shift yd and that pt is c/o pain from drain site that was placed after para on 01/11. Pt states pain level is still the same. Jesus TEJADA stated that she doesn't want to give pt IV pain meds for drain site pain, stated that if pt pain is not better within the next hour to give an additional oxy IR 5mg PO. Will continue to monitor.

## 2018-01-13 NOTE — PROGRESS NOTES
[facility]  Liver Transplant  Progress Note    Patient Name: Jhonny Diana  MRN: [id number]  Admission Date: 2018  Hospital Length of Stay: 2 days  Code Status: Full Code  Primary Care Provider: Primary Doctor No  Post-Operative Day: 86    ORGAN:   LIVER  Disease Etiology: Acute Alcoholic Hepatitis  Donor Type:    - Brain Death  CDC High Risk:   No  Donor CMV Status:   Donor CMV Status: Positive  Donor HBcAB:   Negative  Donor HCV Status:   Negative  Whole or Partial: Whole Liver  Biliary Anastomosis: End to End  Arterial Anatomy: Standard  Subjective:     History of Present Illness:  Jhonny Diana is a 26 y/o male with past medical history of alcoholic cirrhosis.  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 Found to have peritonitis in November (WBC 5000, 75% PNM) neg for bile leak. He was treated initially with vanc/cefepime. Repeat cell counts  with some improvement (WBC 1400, 45% PNM). He has undergone multiple paracenteses as well as abscess drainage of perihepatic fluid collections and treated with antimicrobial therapy but no positive cultures. Of note, biliary stricture also identified and ERCP performed on 2017 with sphincterotomy and biliary stent placed. Liver tests still have not normalized despite intervention, bilirubin and AP remain elevated. Other pertinent PMH current wound vac in place 2/2 wound infection, malnutrition requiring TPN for short course and ongoing hypoalbuminemia, and seizure activity while on prograf and has since been switched to cyclosporine without reoccurrence.  He presented to the ER for fever, abdominal pain, and N/V. He reports fever (103) for 1 day  prior. Overnight, he developed N/V, reports small amount of green emesis with new left sided pain. He also endorses worsening SOB with exertion. He was scheduled as an outpatient for follow up paracentesis and IR drainage of fluid collection. CXR in ER shows large pleural effusion with subsegmental atelectasis. Infectious work up initiated in ER. His ANC is 900. Broad spectrum antibiotics initiated in ED. He denies chest pain, palpitations, diarrhea, constipation, or back pain. Denies any sick contacts.    Hospital Course:  Interval History: C/o worsening abdominal pain > Right side where drain is. Drain output minimal and w/o sign of infection.  Drain removed- tip intact.  Hopefully pain improved.  KUB repeated today given increased abdominal pain- mild, generalized bowel distention suggesting ileus.  Diet changed to clears as tolerated for bowel rest  IF nausea/vomiting, recommend NGT.  T max 99.9.  Will cont IV abx for now.  ID following.  Resp even and non labored.  Fluid from thoracentesis also w/o sign of infection. CMV PCR still pending.  Appetite fair.  Encourage supplements.  T bili same (2.7). May need repeat ERCP if worsens. Monitor.    Scheduled Meds:   sodium chloride 0.9%   Intravenous Once    atovaquone  1,500 mg Oral Daily    ceFEPime (MAXIPIME) IVPB  1 g Intravenous Q12H    cycloSPORINE modified (NEORAL)  50 mg Oral BID    ergocalciferol  50,000 Units Oral Q7 Days    heparin (porcine)  5,000 Units Subcutaneous Q12H    levETIRAcetam  500 mg Oral BID    levothyroxine  75 mcg Oral Before breakfast    mirtazapine  7.5 mg Oral QHS    multivitamin  1 tablet Oral Daily    pantoprazole  40 mg Oral Daily    thiamine  100 mg Oral Daily    ursodiol  300 mg Oral BID     Continuous Infusions:  PRN Meds:sodium chloride, sodium chloride 0.9%, acetaminophen, docusate sodium, heparin (porcine), ondansetron, ondansetron, oxyCODONE, oxyCODONE, prochlorperazine, sodium chloride 0.9%    Review of Systems    Constitutional: Positive for activity change, appetite change and fever. Negative for chills and fatigue.   HENT: Negative.    Respiratory: Negative for cough, shortness of breath and wheezing.    Cardiovascular: Positive for leg swelling. Negative for chest pain.   Gastrointestinal: Positive for abdominal distention and abdominal pain. Negative for constipation, nausea and vomiting.   Genitourinary: Negative for difficulty urinating and dysuria.        Anuric   Musculoskeletal: Negative for arthralgias, back pain and myalgias.   Skin: Positive for wound (chevron incision with wound vac). Negative for color change and rash.   Allergic/Immunologic: Positive for immunocompromised state.   Neurological: Positive for weakness. Negative for dizziness, seizures and headaches.   Psychiatric/Behavioral: Negative for confusion and decreased concentration. The patient is not nervous/anxious.      Objective:     Vital Signs (Most Recent):  Temp: 99 °F (37.2 °C) (01/13/18 1151)  Pulse: 89 (01/13/18 1151)  Resp: 18 (01/13/18 1151)  BP: 110/70 (01/13/18 1151)  SpO2: 96 % (01/13/18 1151) Vital Signs (24h Range):  Temp:  [98.5 °F (36.9 °C)-99.9 °F (37.7 °C)] 99 °F (37.2 °C)  Pulse:  [] 89  Resp:  [16-19] 18  SpO2:  [94 %-97 %] 96 %  BP: (105-118)/(57-75) 110/70     Weight: 62.3 kg (137 lb 5.6 oz)  Body mass index is 22.17 kg/m².    Intake/Output - Last 3 Shifts       01/11 0700 - 01/12 0659 01/12 0700 - 01/13 0659 01/13 0700 - 01/14 0659    P.O. 360 240     I.V. (mL/kg)  0 (0)     Other  950     IV Piggyback 100 50     Total Intake(mL/kg) 460 (7.4) 1240 (19.9)     Urine (mL/kg/hr) 0 (0) 50 (0)     Emesis/NG output 0 (0) 0 (0)     Drains 40 (0) 15 (0)     Other 2800 (1.9) 3006 (2)     Stool 0 (0) 0 (0)     Blood 0 (0) 0 (0)     Total Output 2840 0372      Net -3003 -5119             Urine Occurrence 0 x 0 x     Stool Occurrence 0 x 0 x 0 x    Emesis Occurrence 0 x 0 x           Physical Exam   Constitutional: He is oriented  to person, place, and time. He appears well-developed. No distress.   Temporal and distal extremity muscle wasting   HENT:   Head: Normocephalic and atraumatic.   Mouth/Throat: No oropharyngeal exudate.   Eyes: EOM are normal. Pupils are equal, round, and reactive to light. Scleral icterus is present.   Neck: Normal range of motion. Neck supple. No JVD present. No thyromegaly present.   Cardiovascular: Normal rate, regular rhythm, normal heart sounds and intact distal pulses.    No murmur heard.  Pulmonary/Chest: Effort normal. No respiratory distress. He has decreased breath sounds in the right middle field, the right lower field and the left lower field. He has no wheezes. He exhibits no tenderness.   Diminished to RLL   Abdominal: Soft. Bowel sounds are normal. He exhibits distension and ascites. There is tenderness (mild). There is no rebound and no guarding.   Wound vac to incision   Musculoskeletal: Normal range of motion. He exhibits edema (2+ LE edema). He exhibits no tenderness.   Neurological: He is alert and oriented to person, place, and time. He has normal reflexes.   Skin: Skin is warm and dry. Capillary refill takes 2 to 3 seconds. He is not diaphoretic. No erythema.   jaundice   Psychiatric: He has a normal mood and affect. His behavior is normal. Judgment and thought content normal. His mood appears not anxious.   Nursing note and vitals reviewed.      Laboratory:  Immunosuppressants         Stop Route Frequency     cycloSPORINE modified (NEORAL) capsule 50 mg      -- Oral 2 times daily        CBC:     Recent Labs  Lab 01/13/18  0438   WBC 3.23*   RBC 3.02*   HGB 9.2*   HCT 27.7*      MCV 92   MCH 30.5   MCHC 33.2     CMP:     Recent Labs  Lab 01/13/18  0438   GLU 81   CALCIUM 8.1*   ALBUMIN 1.5*   PROT 4.3*      K 3.8   CO2 27      BUN 8   CREATININE 2.7*   ALKPHOS 133   ALT <5*   AST 19   BILITOT 2.7*     Coagulation:     Recent Labs  Lab 01/11/18  0434   INR 1.2      Cyclosporine, LC/MS   Date Value Ref Range Status   01/13/2018 391 100 - 400 ng/mL Final     Comment:     Reference Normals:  For Kidney Transplants: 100-300 ng/mL  Testing performed by Liquid Chromatography-Tandem  Mass Spectrometry (LC-MS/MS).  This test was developed and its performance characteristics  determined by [facility], Department of Pathology  and Laboratory Medicine in a manner consistent with CLIA  requirements. It has not been cleared or approved by the [place]  Food and Drug Administration.  This test is used for clinical  purposes.  It should not be regarded as investigational or for  research.     01/12/2018 244 100 - 400 ng/mL Final     Comment:     Reference Normals:  For Kidney Transplants: 100-300 ng/mL  Testing performed by Liquid Chromatography-Tandem  Mass Spectrometry (LC-MS/MS).  This test was developed and its performance characteristics  determined by [facility], Department of Pathology  and Laboratory Medicine in a manner consistent with CLIA  requirements. It has not been cleared or approved by the [place]  Food and Drug Administration.  This test is used for clinical  purposes.  It should not be regarded as investigational or for  research.     01/11/2018 223 100 - 400 ng/mL Final     Comment:     Reference Normals:  For Kidney Transplants: 100-300 ng/mL  Testing performed by Liquid Chromatography-Tandem  Mass Spectrometry (LC-MS/MS).  This test was developed and its performance characteristics  determined by [facility], Department of Pathology  and Laboratory Medicine in a manner consistent with CLIA  requirements. It has not been cleared or approved by the [place]  Food and Drug Administration.  This test is used for clinical  purposes.  It should not be regarded as investigational or for  research.     01/08/2018 238 100 - 400 ng/mL Final     Comment:     Reference Normals:  For Kidney Transplants: 100-300 ng/mL  Testing performed by Liquid  Chromatography-Tandem  Mass Spectrometry (LC-MS/MS).  This test was developed and its performance characteristics  determined by [facility], Department of Pathology  and Laboratory Medicine in a manner consistent with CLIA  requirements. It has not been cleared or approved by the [place]  Food and Drug Administration.  This test is used for clinical  purposes.  It should not be regarded as investigational or for  research.         Labs within the past 24 hours have been reviewed.    Diagnostic Results:  I have personally reviewed all pertinent imaging studies.    Assessment/Plan:     * Fever    - unclear source of fever, previously thought to be related to peritonitis  - blood cultures prelim NGTD. No urine cx obtained as pt anuric.   - Cont broad spectrum antibiotics for now as per ID recs.   - Pt underwent thoracentesis, paracentesis, and drain placement into fluid collection 1/11/18.   - All fluids reviewed and negative for infection. F/u on cultures.   - T max 99.9.  Cont IV abx per ID.          Liver transplanted    - post op course complicated by fevers and hyperbilirubinemia  - ERCP 12/6 with post-anastomosis stricture with stent placement  - AST/ALT normal. Tbili/alkphos remain elevated  - Liver US 1/8 showed 3.9cm complex fluid collection anterior to right lobe and moderate nonspecific complex ascites inferior to transplant.   - IR placed drain 1/11/18, cell count negative for infection.  - As T bili remains elevated and pt with previous stricture, he may need a repeat ERCP in near future.   - Currently 5 weeks out from previous ERCP. Will discuss case with PBS Monday.         Long-term use of immunosuppressant medication    - Maintenance IS with cyclosporine. MMF on hold for infections and neutropenia. cont to check prograf level daily. Assess for toxicity and adjust level as needed        At risk for opportunistic infections    - hold valcyte as WBC low.  CMV PCR pending.  - Continue Bactrim for  PCP proph  - isavu for fungal prophylaxis 2/2 complicated post-op course        Other ascites    - Paracentesis performed 1/11/18 with 1600 ml removed.    - fluid negative for infection.           Severe protein-calorie malnutrition    - consult dietician          Neutropenia    -  1/11/18.    - Infectious work up negative so far.   - Cont with BS antibxs as with fevers on admit.   - CMV PCR pending. Monitor.        Nausea and vomiting    - h/o constipation, reports having regular BM's with bowel regimen  - vomiting episode x 1 at home. Anti-emetics ordered PRN.  - KUB repeated today given increased abdominal pain- mild, generalized bowel distention suggesting ileus.  Diet changed to clears as tolerated for bowel rest  IF nausea/vomiting, recommend NGT.          Pleural effusion, right    - CXR in ER with large right pleural effusion with subjective c/o worsening SOB  - thoracentesis completed- 1200 ml removed.  SOB with significant improvement.   - Cell count reviewed and negative for infection.   - resp even and non labored.  O2 sat 94-98%.        Protein-calorie malnutrition    - Poor PO intake since transplant requiring short course of TPN during previous hospital stay.   - albumin remains decreased but appetite slowly improving per pt.   - Dietary consulted. Will need to closely monitor PO intake.   - supplements also ordered.         Peritonitis    - initially diagnosed in November but has since had improved cell counts in recent para's  - IR placed drain in fluid collection and paracentesis performed 1/11/18. Both fluids reviewed and negative for infection based on cell counts.   - F/u on cultures.         Anemia of chronic disease    - responded appropriately to 1 u PRBC given 1/12.    - Trend H&H daily.         Delayed surgical wound healing    - wd vac removed 1/12/18.  Wound healing well.          Acute renal failure with tubular necrosis    - HD resumed on previous admission. Now anuric and  dialyzes M-W-F.  - Nephrology following.  Tolerated last HD 1/12/18.            VTE Risk Mitigation         Ordered     heparin (porcine) injection 1,000 Units  As needed (PRN)     Route:  Intra-Catheter        01/12/18 1017     heparin (porcine) injection 5,000 Units  Every 12 hours     Route:  Subcutaneous        01/12/18 0926     Medium Risk of VTE  Once      01/11/18 0351     Place sequential compression device  Until discontinued      01/11/18 0351          The patients clinical status was discussed at multidisplinary rounds, involving transplant surgery, transplant medicine, pharmacy, nursing, nutrition, and social work    Discharge Planning: No plan for discharge.    Norma Blackwood, NP  Liver Transplant  [facility]

## 2018-01-14 LAB
ALBUMIN SERPL BCP-MCNC: 1.5 G/DL
ALP SERPL-CCNC: 124 U/L
ALT SERPL W/O P-5'-P-CCNC: 5 U/L
ANION GAP SERPL CALC-SCNC: 8 MMOL/L
AST SERPL-CCNC: 22 U/L
BASOPHILS # BLD AUTO: 0.03 K/UL
BASOPHILS NFR BLD: 0.9 %
BILIRUB SERPL-MCNC: 2.6 MG/DL
BUN SERPL-MCNC: 14 MG/DL
CALCIUM SERPL-MCNC: 8.5 MG/DL
CHLORIDE SERPL-SCNC: 104 MMOL/L
CO2 SERPL-SCNC: 23 MMOL/L
CREAT SERPL-MCNC: 3.5 MG/DL
CYCLOSPORINE BLD LC/MS/MS-MCNC: 178 NG/ML
DIFFERENTIAL METHOD: ABNORMAL
EOSINOPHIL # BLD AUTO: 0.2 K/UL
EOSINOPHIL NFR BLD: 4.6 %
ERYTHROCYTE [DISTWIDTH] IN BLOOD BY AUTOMATED COUNT: 16.7 %
EST. GFR  (AFRICAN AMERICAN): 25.9 ML/MIN/1.73 M^2
EST. GFR  (NON AFRICAN AMERICAN): 22.4 ML/MIN/1.73 M^2
GLUCOSE SERPL-MCNC: 73 MG/DL
HCT VFR BLD AUTO: 25.7 %
HGB BLD-MCNC: 8.7 G/DL
IMM GRANULOCYTES # BLD AUTO: 0.03 K/UL
IMM GRANULOCYTES NFR BLD AUTO: 0.9 %
LYMPHOCYTES # BLD AUTO: 1.2 K/UL
LYMPHOCYTES NFR BLD: 36.3 %
MAGNESIUM SERPL-MCNC: 1.9 MG/DL
MCH RBC QN AUTO: 31.3 PG
MCHC RBC AUTO-ENTMCNC: 33.9 G/DL
MCV RBC AUTO: 92 FL
MONOCYTES # BLD AUTO: 0.9 K/UL
MONOCYTES NFR BLD: 27.7 %
NEUTROPHILS # BLD AUTO: 1 K/UL
NEUTROPHILS NFR BLD: 29.6 %
NRBC BLD-RTO: 0 /100 WBC
PLATELET # BLD AUTO: 205 K/UL
PMV BLD AUTO: 9.4 FL
POTASSIUM SERPL-SCNC: 3.8 MMOL/L
PROT SERPL-MCNC: 4.3 G/DL
RBC # BLD AUTO: 2.78 M/UL
SODIUM SERPL-SCNC: 135 MMOL/L
WBC # BLD AUTO: 3.25 K/UL

## 2018-01-14 PROCEDURE — 80158 DRUG ASSAY CYCLOSPORINE: CPT

## 2018-01-14 PROCEDURE — P9047 ALBUMIN (HUMAN), 25%, 50ML: HCPCS | Mod: JG | Performed by: NURSE PRACTITIONER

## 2018-01-14 PROCEDURE — 99233 SBSQ HOSP IP/OBS HIGH 50: CPT | Mod: 24,,, | Performed by: NURSE PRACTITIONER

## 2018-01-14 PROCEDURE — 83735 ASSAY OF MAGNESIUM: CPT

## 2018-01-14 PROCEDURE — 20600001 HC STEP DOWN PRIVATE ROOM

## 2018-01-14 PROCEDURE — 25000003 PHARM REV CODE 250: Performed by: NURSE PRACTITIONER

## 2018-01-14 PROCEDURE — 80053 COMPREHEN METABOLIC PANEL: CPT

## 2018-01-14 PROCEDURE — 99233 SBSQ HOSP IP/OBS HIGH 50: CPT | Mod: ,,, | Performed by: INTERNAL MEDICINE

## 2018-01-14 PROCEDURE — 63600175 PHARM REV CODE 636 W HCPCS: Mod: JG | Performed by: NURSE PRACTITIONER

## 2018-01-14 PROCEDURE — 63600175 PHARM REV CODE 636 W HCPCS: Performed by: NURSE PRACTITIONER

## 2018-01-14 PROCEDURE — 85025 COMPLETE CBC W/AUTO DIFF WBC: CPT

## 2018-01-14 PROCEDURE — 63600175 PHARM REV CODE 636 W HCPCS: Performed by: SURGERY

## 2018-01-14 PROCEDURE — 25000003 PHARM REV CODE 250: Performed by: SURGERY

## 2018-01-14 PROCEDURE — 36415 COLL VENOUS BLD VENIPUNCTURE: CPT

## 2018-01-14 RX ORDER — PSEUDOEPHEDRINE/ACETAMINOPHEN 30MG-500MG
100 TABLET ORAL
Status: COMPLETED | OUTPATIENT
Start: 2018-01-14 | End: 2018-01-14

## 2018-01-14 RX ORDER — SYRING-NEEDL,DISP,INSUL,0.3 ML 29 G X1/2"
296 SYRINGE, EMPTY DISPOSABLE MISCELLANEOUS
Status: COMPLETED | OUTPATIENT
Start: 2018-01-14 | End: 2018-01-14

## 2018-01-14 RX ORDER — ALBUMIN HUMAN 250 G/1000ML
25 SOLUTION INTRAVENOUS EVERY 8 HOURS
Status: COMPLETED | OUTPATIENT
Start: 2018-01-14 | End: 2018-01-15

## 2018-01-14 RX ORDER — SODIUM CHLORIDE 9 MG/ML
INJECTION, SOLUTION INTRAVENOUS
Status: DISCONTINUED | OUTPATIENT
Start: 2018-01-15 | End: 2018-01-26

## 2018-01-14 RX ADMIN — Medication 100 ML: at 11:01

## 2018-01-14 RX ADMIN — Medication 100 MG: at 08:01

## 2018-01-14 RX ADMIN — MAGESIUM CITRATE 296 ML: 1.75 LIQUID ORAL at 11:01

## 2018-01-14 RX ADMIN — OXYCODONE HYDROCHLORIDE 10 MG: 5 TABLET ORAL at 03:01

## 2018-01-14 RX ADMIN — PANTOPRAZOLE SODIUM 40 MG: 40 TABLET, DELAYED RELEASE ORAL at 08:01

## 2018-01-14 RX ADMIN — LEVOTHYROXINE SODIUM 75 MCG: 75 TABLET ORAL at 05:01

## 2018-01-14 RX ADMIN — CYCLOSPORINE 50 MG: 25 CAPSULE, LIQUID FILLED ORAL at 08:01

## 2018-01-14 RX ADMIN — OXYCODONE HYDROCHLORIDE 5 MG: 5 TABLET ORAL at 10:01

## 2018-01-14 RX ADMIN — CYCLOSPORINE 75 MG: 25 CAPSULE, LIQUID FILLED ORAL at 09:01

## 2018-01-14 RX ADMIN — OXYCODONE HYDROCHLORIDE 5 MG: 5 TABLET ORAL at 09:01

## 2018-01-14 RX ADMIN — MIRTAZAPINE 7.5 MG: 7.5 TABLET ORAL at 09:01

## 2018-01-14 RX ADMIN — URSODIOL 300 MG: 300 CAPSULE ORAL at 09:01

## 2018-01-14 RX ADMIN — LEVETIRACETAM 500 MG: 500 TABLET ORAL at 09:01

## 2018-01-14 RX ADMIN — LEVETIRACETAM 500 MG: 500 TABLET ORAL at 08:01

## 2018-01-14 RX ADMIN — SODIUM CHLORIDE 500 ML: 0.9 INJECTION, SOLUTION INTRAVENOUS at 11:01

## 2018-01-14 RX ADMIN — THERA TABS 1 TABLET: TAB at 08:01

## 2018-01-14 RX ADMIN — CEFEPIME HYDROCHLORIDE 1 G: 1 INJECTION, SOLUTION INTRAVENOUS at 05:01

## 2018-01-14 RX ADMIN — ATOVAQUONE 1500 MG: 750 SUSPENSION ORAL at 08:01

## 2018-01-14 RX ADMIN — ALBUMIN (HUMAN) 25 G: 12.5 SOLUTION INTRAVENOUS at 02:01

## 2018-01-14 RX ADMIN — URSODIOL 300 MG: 300 CAPSULE ORAL at 08:01

## 2018-01-14 RX ADMIN — OXYCODONE HYDROCHLORIDE 10 MG: 5 TABLET ORAL at 08:01

## 2018-01-14 RX ADMIN — HEPARIN SODIUM 5000 UNITS: 5000 INJECTION, SOLUTION INTRAVENOUS; SUBCUTANEOUS at 09:01

## 2018-01-14 RX ADMIN — HEPARIN SODIUM 5000 UNITS: 5000 INJECTION, SOLUTION INTRAVENOUS; SUBCUTANEOUS at 08:01

## 2018-01-14 RX ADMIN — ALBUMIN (HUMAN) 25 G: 12.5 SOLUTION INTRAVENOUS at 09:01

## 2018-01-14 NOTE — PROGRESS NOTES
[facility]  Liver Transplant  Progress Note    Patient Name: Jhonny Diana  MRN: [id number]  Admission Date: 2018  Hospital Length of Stay: 3 days  Code Status: Full Code  Primary Care Provider: Primary Doctor No  Post-Operative Day: 87    ORGAN:   LIVER  Disease Etiology: Acute Alcoholic Hepatitis  Donor Type:    - Brain Death  CDC High Risk:   No  Donor CMV Status:   Donor CMV Status: Positive  Donor HBcAB:   Negative  Donor HCV Status:   Negative  Whole or Partial: Whole Liver  Biliary Anastomosis: End to End  Arterial Anatomy: Standard  Subjective:     History of Present Illness:  Jhonny Diana is a 28 y/o male with past medical history of alcoholic cirrhosis.  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 Found to have peritonitis in November (WBC 5000, 75% PNM) neg for bile leak. He was treated initially with vanc/cefepime. Repeat cell counts  with some improvement (WBC 1400, 45% PNM). He has undergone multiple paracenteses as well as abscess drainage of perihepatic fluid collections and treated with antimicrobial therapy but no positive cultures. Of note, biliary stricture also identified and ERCP performed on 2017 with sphincterotomy and biliary stent placed. Liver tests still have not normalized despite intervention, bilirubin and AP remain elevated. Other pertinent PMH current wound vac in place 2/2 wound infection, malnutrition requiring TPN for short course and ongoing hypoalbuminemia, and seizure activity while on prograf and has since been switched to cyclosporine without reoccurrence.  He presented to the ER for fever, abdominal pain, and N/V. He reports fever (103) for 1 day  prior. Overnight, he developed N/V, reports small amount of green emesis with new left sided pain. He also endorses worsening SOB with exertion. He was scheduled as an outpatient for follow up paracentesis and IR drainage of fluid collection. CXR in ER shows large pleural effusion with subsegmental atelectasis. Infectious work up initiated in ER. His ANC is 900. Broad spectrum antibiotics initiated in ED. He denies chest pain, palpitations, diarrhea, constipation, or back pain. Denies any sick contacts.    Hospital Course:  Interval History: Abdominal pain same.  KUB reviewed w abundant stool. Enema ordered. Albumin low.  Alumin x3 ordered. SHANE ordered for nutrition. Cont clear liquid diet.  Prealbumin in am.  ID following.  Cont IV abx.    Resp even and non labored.  Fluid from thoracentesis also w/o sign of infection. CMV PCR still pending.  T bili same (2.7). May need repeat ERCP if worsens. Monitor.    Scheduled Meds:   sodium chloride 0.9%   Intravenous Once    albumin human 25%  25 g Intravenous Q8H    atovaquone  1,500 mg Oral Daily    ceFEPime (MAXIPIME) IVPB  1 g Intravenous Q12H    cycloSPORINE modified (NEORAL)  75 mg Oral BID    ergocalciferol  50,000 Units Oral Q7 Days    heparin (porcine)  5,000 Units Subcutaneous Q12H    levETIRAcetam  500 mg Oral BID    levothyroxine  75 mcg Oral Before breakfast    mirtazapine  7.5 mg Oral QHS    multivitamin  1 tablet Oral Daily    pantoprazole  40 mg Oral Daily    thiamine  100 mg Oral Daily    ursodiol  300 mg Oral BID     Continuous Infusions:  PRN Meds:acetaminophen, docusate sodium, heparin (porcine), ondansetron, ondansetron, oxyCODONE, oxyCODONE, prochlorperazine, sodium chloride 0.9%    Review of Systems   Constitutional: Positive for activity change, appetite change and fever. Negative for chills and fatigue.   HENT: Negative.    Respiratory: Negative for cough, shortness of breath and wheezing.    Cardiovascular: Positive for leg swelling.  Negative for chest pain.   Gastrointestinal: Positive for abdominal distention and abdominal pain. Negative for constipation, nausea and vomiting.   Genitourinary: Negative for difficulty urinating and dysuria.        Anuric   Musculoskeletal: Negative for arthralgias, back pain and myalgias.   Skin: Positive for wound (chevron incision with wound vac). Negative for color change and rash.   Allergic/Immunologic: Positive for immunocompromised state.   Neurological: Positive for weakness. Negative for dizziness, seizures and headaches.   Psychiatric/Behavioral: Negative for confusion and decreased concentration. The patient is not nervous/anxious.      Objective:     Vital Signs (Most Recent):  Temp: 99 °F (37.2 °C) (01/14/18 0813)  Pulse: 95 (01/14/18 0813)  Resp: 18 (01/14/18 0813)  BP: 118/71 (01/14/18 0813)  SpO2: 96 % (01/14/18 0813) Vital Signs (24h Range):  Temp:  [98.9 °F (37.2 °C)-99.2 °F (37.3 °C)] 99 °F (37.2 °C)  Pulse:  [86-97] 95  Resp:  [15-18] 18  SpO2:  [95 %-99 %] 96 %  BP: (104-118)/(58-71) 118/71     Weight: 62.3 kg (137 lb 5.6 oz)  Body mass index is 22.17 kg/m².    Intake/Output - Last 3 Shifts       01/12 0700 - 01/13 0659 01/13 0700 - 01/14 0659 01/14 0700 - 01/15 0659    P.O. 240 460     I.V. (mL/kg) 0 (0) 0 (0)     Other 950 0     IV Piggyback 50 100     Total Intake(mL/kg) 1240 (19.9) 560 (9)     Urine (mL/kg/hr) 50 (0) 100 (0.1)     Emesis/NG output 0 (0) 0 (0)     Drains 15 (0)      Other 3006 (2) 0 (0)     Stool 0 (0) 0 (0)     Blood 0 (0) 0 (0)     Total Output 3071 100      Net -1831 +460             Urine Occurrence 0 x 0 x     Stool Occurrence 0 x 0 x 0 x    Emesis Occurrence 0 x 0 x           Physical Exam   Constitutional: He is oriented to person, place, and time. He appears well-developed. No distress.   Temporal and distal extremity muscle wasting   HENT:   Head: Normocephalic and atraumatic.   Mouth/Throat: No oropharyngeal exudate.   Eyes: EOM are normal. Pupils are equal,  round, and reactive to light. Scleral icterus is present.   Neck: Normal range of motion. Neck supple. No JVD present. No thyromegaly present.   Cardiovascular: Normal rate, regular rhythm, normal heart sounds and intact distal pulses.    No murmur heard.  Pulmonary/Chest: Effort normal. No respiratory distress. He has decreased breath sounds in the right middle field, the right lower field and the left lower field. He has no wheezes. He exhibits no tenderness.   Diminished to RLL   Abdominal: Soft. Bowel sounds are normal. He exhibits distension and ascites. There is tenderness (mild). There is no rebound and no guarding.   Dressing to chevron.  Wd vac removed 1/12/18   Musculoskeletal: Normal range of motion. He exhibits edema (2+ LE edema). He exhibits no tenderness.   Neurological: He is alert and oriented to person, place, and time. He has normal reflexes.   Skin: Skin is warm and dry. Capillary refill takes 2 to 3 seconds. He is not diaphoretic. No erythema.   jaundice   Psychiatric: He has a normal mood and affect. His behavior is normal. Judgment and thought content normal. His mood appears not anxious.   Nursing note and vitals reviewed.      Laboratory:  Immunosuppressants         Stop Route Frequency     cycloSPORINE modified (NEORAL) capsule 75 mg      -- Oral 2 times daily        CBC:     Recent Labs  Lab 01/14/18  0708   WBC 3.25*   RBC 2.78*   HGB 8.7*   HCT 25.7*      MCV 92   MCH 31.3*   MCHC 33.9     CMP:     Recent Labs  Lab 01/14/18  0453   GLU 73   CALCIUM 8.5*   ALBUMIN 1.5*   PROT 4.3*   *   K 3.8   CO2 23      BUN 14   CREATININE 3.5*   ALKPHOS 124   ALT 5*   AST 22   BILITOT 2.6*     Coagulation:     Recent Labs  Lab 01/11/18  0434   INR 1.2     Cyclosporine, LC/MS   Date Value Ref Range Status   01/14/2018 178 100 - 400 ng/mL Final     Comment:     Reference Normals:  For Kidney Transplants: 100-300 ng/mL  Testing performed by Liquid Chromatography-Tandem  Mass  Spectrometry (LC-MS/MS).  This test was developed and its performance characteristics  determined by [facility], Department of Pathology  and Laboratory Medicine in a manner consistent with CLIA  requirements. It has not been cleared or approved by the [place]  Food and Drug Administration.  This test is used for clinical  purposes.  It should not be regarded as investigational or for  research.     01/13/2018 391 100 - 400 ng/mL Final     Comment:     Reference Normals:  For Kidney Transplants: 100-300 ng/mL  Testing performed by Liquid Chromatography-Tandem  Mass Spectrometry (LC-MS/MS).  This test was developed and its performance characteristics  determined by [facility], Department of Pathology  and Laboratory Medicine in a manner consistent with CLIA  requirements. It has not been cleared or approved by the [place]  Food and Drug Administration.  This test is used for clinical  purposes.  It should not be regarded as investigational or for  research.     01/12/2018 244 100 - 400 ng/mL Final     Comment:     Reference Normals:  For Kidney Transplants: 100-300 ng/mL  Testing performed by Liquid Chromatography-Tandem  Mass Spectrometry (LC-MS/MS).  This test was developed and its performance characteristics  determined by [facility], Department of Pathology  and Laboratory Medicine in a manner consistent with CLIA  requirements. It has not been cleared or approved by the [place]  Food and Drug Administration.  This test is used for clinical  purposes.  It should not be regarded as investigational or for  research.     01/11/2018 223 100 - 400 ng/mL Final     Comment:     Reference Normals:  For Kidney Transplants: 100-300 ng/mL  Testing performed by Liquid Chromatography-Tandem  Mass Spectrometry (LC-MS/MS).  This test was developed and its performance characteristics  determined by [facility], Department of Pathology  and Laboratory Medicine in a manner consistent with  CLIA  requirements. It has not been cleared or approved by the [place]  Food and Drug Administration.  This test is used for clinical  purposes.  It should not be regarded as investigational or for  research.         Labs within the past 24 hours have been reviewed.    Diagnostic Results:  I have personally reviewed all pertinent imaging studies.    Assessment/Plan:     * Fever    - unclear source of fever, previously thought to be related to peritonitis  - blood cultures prelim NGTD. No urine cx obtained as pt anuric.   - Cont broad spectrum antibiotics for now as per ID recs.   - Pt underwent thoracentesis, paracentesis, and drain placement into fluid collection 1/11/18.   - All fluids reviewed and negative for infection. F/u on cultures.   - T max 99.2.  Cont IV abx per ID.          Liver transplanted    - post op course complicated by fevers and hyperbilirubinemia  - ERCP 12/6 with post-anastomosis stricture with stent placement  - AST/ALT normal. Tbili/alkphos remain elevated  - Liver US 1/8 showed 3.9cm complex fluid collection anterior to right lobe and moderate nonspecific complex ascites inferior to transplant.   - IR placed drain 1/11/18, cell count negative for infection.  - As T bili remains elevated and pt with previous stricture, he may need a repeat ERCP in near future.   - Currently 5 weeks out from previous ERCP. Will discuss case with PBS Monday.         Long-term use of immunosuppressant medication    - Maintenance IS with cyclosporine. MMF on hold for infections and neutropenia. cont to check cyclo level daily. Assess for toxicity and adjust level as needed        At risk for opportunistic infections    - hold valcyte as WBC low.  CMV PCR pending.  - Continue Bactrim for PCP proph  - isavu for fungal prophylaxis 2/2 complicated post-op course        Other ascites    - Paracentesis performed 1/11/18 with 1600 ml removed.    - fluid negative for infection.           Severe protein-calorie malnutrition     - consult dietician.  - SHANE and tube feeds ordered          Neutropenia    -  1/11/18.    - Infectious work up negative so far.   - Cont with BS antibxs as with fevers on admit.   - CMV PCR pending. Monitor.        Nausea and vomiting    - h/o constipation, reports having regular BM's with bowel regimen  - vomiting episode x 1 at home. Anti-emetics ordered PRN.  - KUB repeated today given increased abdominal pain- mild, generalized bowel distention suggesting ileus.  Diet changed to clears as tolerated for bowel rest   - SHANE ordered for feeds.          Pleural effusion, right    - CXR in ER with large right pleural effusion with subjective c/o worsening SOB  - thoracentesis completed- 1200 ml removed.  SOB with significant improvement.   - Cell count reviewed and negative for infection.   - resp even and non labored.  O2 sat 94-98%.        Protein-calorie malnutrition    - Poor PO intake since transplant requiring short course of TPN during previous hospital stay.   - albumin remains decreased but appetite slowly improving per pt.   - Dietary consulted. Will need to closely monitor PO intake.   - supplements also ordered.   - SHANE and tube feeds ordered.  - prealbumin in am.          Peritonitis    - initially diagnosed in November but has since had improved cell counts in recent para's  - IR placed drain in fluid collection and paracentesis performed 1/11/18. Both fluids reviewed and negative for infection based on cell counts.   - F/u on cultures.         Anemia of chronic disease    - responded appropriately to 1 u PRBC given 1/12.    - Trend H&H daily.         Delayed surgical wound healing    - wd vac removed 1/12/18.  Wound healing well.          Acute renal failure with tubular necrosis    - HD resumed on previous admission. Now anuric and dialyzes M-W-F.  - Nephrology following.  Tolerated last HD 1/12/18.            VTE Risk Mitigation         Ordered     heparin (porcine) injection 1,000 Units  As  needed (PRN)     Route:  Intra-Catheter        01/12/18 1017     heparin (porcine) injection 5,000 Units  Every 12 hours     Route:  Subcutaneous        01/12/18 0926     Medium Risk of VTE  Once      01/11/18 0351     Place sequential compression device  Until discontinued      01/11/18 0351          The patients clinical status was discussed at multidisplinary rounds, involving transplant surgery, transplant medicine, pharmacy, nursing, nutrition, and social work    Discharge Planning:  No plan for d/c.    Norma Blackwood NP  Liver Transplant  [facility]

## 2018-01-14 NOTE — NURSING
Pt requested to give himself brown bomb enema. RN prepared and assisted pt with enema administration. Will continue to monitor pt for relief of abdominal pain and constipation.

## 2018-01-14 NOTE — PLAN OF CARE
Problem: Patient Care Overview  Goal: Plan of Care Review  Outcome: Ongoing (interventions implemented as appropriate)  Pt admitted on 1/11 with fever and abdominal pain. Brown bomb enema completed this AM per patient with RN assistance. Per patient very little stool after brown bomb. SHANE tube inserted and Novasource renal started at 10 ml/hr with increase in rate by 5 q2h. Pt up with assistance throughout shift. Pain meds given x 1.

## 2018-01-14 NOTE — ASSESSMENT & PLAN NOTE
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+, steroid induction, on maintenance tacro/MMF/pred; c/b seizures, LAURA, superificial wound infection s/p wound vac to [facility] through 1/12, and several recent admissions on 11/24 and 12/3 with culture negative peritonitis with peak WBC ~5k due to suspected indolent biliary leakage s/p sphincterotomy/biliary stent placement over CBD stricture on 12/6 ERCP although notably with no overt leak noted during procedure) who was admitted on 1/11/2017 with acute onset fevers and acute on chronic N/V/abdominal pain (RLQ worst) in the setting of persistent noninflammatory ascites of unknown etiology. He underwent drainage of pleural fluid and ascites that did not show evidence of gross infection but his fevers have abated and he has shown modest clinical improvement on empiric cefepime in house. Differential for fevers and chronic N/V/abdominal pain include: indolent bile leak (with still abnormal LFT's and known stent in place), walled off abdominal abscess (not yet accessed by peritoneal drain/blind tap as ascites counts noninflammatory), CMV infection (as valcyte on hold), or another OI (as he is now almost 3mos post-transplant). He is slightly improved on empiric coverage in house.    - would continue empiric cefepime for now and observe fever curve, symptoms, LFT's on therapy through weekend  - await pending CMV quant  - if CMV is negative, may need additional evaluation of biliary system by ERCP if LFT's not normalized and symptoms persist despite the above

## 2018-01-14 NOTE — ASSESSMENT & PLAN NOTE
- unclear source of fever, previously thought to be related to peritonitis  - blood cultures prelim NGTD. No urine cx obtained as pt anuric.   - Cont broad spectrum antibiotics for now as per ID recs.   - Pt underwent thoracentesis, paracentesis, and drain placement into fluid collection 1/11/18.   - All fluids reviewed and negative for infection. F/u on cultures.   - T max 99.2.  Cont IV abx per ID.

## 2018-01-14 NOTE — ASSESSMENT & PLAN NOTE
- Poor PO intake since transplant requiring short course of TPN during previous hospital stay.   - albumin remains decreased but appetite slowly improving per pt.   - Dietary consulted. Will need to closely monitor PO intake.   - supplements also ordered.   - SHANE and tube feeds ordered.  - prealbumin in am.

## 2018-01-14 NOTE — ASSESSMENT & PLAN NOTE
- Maintenance IS with cyclosporine. MMF on hold for infections and neutropenia. cont to check cyclo level daily. Assess for toxicity and adjust level as needed

## 2018-01-14 NOTE — SUBJECTIVE & OBJECTIVE
Interval History: Doing slightly better again today overall. Still with some RLQ abdominal pain and nausea. Afebrile.    Review of Systems   Constitutional: Negative for fever.   Cardiovascular: Positive for leg swelling.   Gastrointestinal: Positive for abdominal pain. Negative for diarrhea, nausea and vomiting.   Genitourinary:        Anuric     Allergic/Immunologic: Positive for immunocompromised state.     Objective:     Vital Signs (Most Recent):  Temp: 98.1 °F (36.7 °C) (01/14/18 1306)  Pulse: 93 (01/14/18 1306)  Resp: 18 (01/14/18 1306)  BP: 108/65 (01/14/18 1306)  SpO2: 100 % (01/14/18 1306) Vital Signs (24h Range):  Temp:  [98.1 °F (36.7 °C)-99.2 °F (37.3 °C)] 98.1 °F (36.7 °C)  Pulse:  [86-97] 93  Resp:  [15-18] 18  SpO2:  [95 %-100 %] 100 %  BP: (104-118)/(58-71) 108/65     Weight: 62.3 kg (137 lb 5.6 oz)  Body mass index is 22.17 kg/m².    Estimated Creatinine Clearance: 27.7 mL/min (based on SCr of 3.5 mg/dL (H)).    Physical Exam   Constitutional: He is oriented to person, place, and time. He appears well-developed. No distress.   Temporal and distal extremity muscle wasting   HENT:   Head: Normocephalic and atraumatic.   Mouth/Throat: No oropharyngeal exudate.   Eyes: EOM are normal. Pupils are equal, round, and reactive to light. Scleral icterus is present.   Neck: Normal range of motion. Neck supple. No JVD present. No thyromegaly present.   Cardiovascular: Normal rate, regular rhythm, normal heart sounds and intact distal pulses.    No murmur heard.  Pulmonary/Chest: Effort normal. No respiratory distress. He has decreased breath sounds in the right middle field, the right lower field and the left lower field. He has no wheezes. He exhibits no tenderness.   Diminished to RLL   Abdominal: Soft. Bowel sounds are normal. He exhibits distension and ascites. There is tenderness (mild). There is no rebound and no guarding.   Wound vac removed.   Musculoskeletal: Normal range of motion. He exhibits edema  (2+ LE edema). He exhibits no tenderness.   Neurological: He is alert and oriented to person, place, and time. He has normal reflexes.   Skin: Skin is warm and dry. Capillary refill takes 2 to 3 seconds. He is not diaphoretic. No erythema.   jaundice   Psychiatric: He has a normal mood and affect. His behavior is normal. Judgment and thought content normal. His mood appears not anxious.   Nursing note and vitals reviewed.      Significant Labs:   CBC:     Recent Labs  Lab 01/13/18  0438 01/14/18  0708   WBC 3.23* 3.25*   HGB 9.2* 8.7*   HCT 27.7* 25.7*    205     CMP:     Recent Labs  Lab 01/13/18  0438 01/14/18  0453    135*   K 3.8 3.8    104   CO2 27 23   GLU 81 73   BUN 8 14   CREATININE 2.7* 3.5*   CALCIUM 8.1* 8.5*   PROT 4.3* 4.3*   ALBUMIN 1.5* 1.5*   BILITOT 2.7* 2.6*   ALKPHOS 133 124   AST 19 22   ALT <5* 5*   ANIONGAP 8 8   EGFRNONAA 30.7* 22.4*       Significant Imaging: I have reviewed all pertinent imaging results/findings within the past 24 hours.       Microbiology:  1/11 blood cx: negative  1/11 pleural fluid cx: negative  1/11 ascites cx; negative

## 2018-01-14 NOTE — ASSESSMENT & PLAN NOTE
- h/o constipation, reports having regular BM's with bowel regimen  - vomiting episode x 1 at home. Anti-emetics ordered PRN.  - KUB repeated today given increased abdominal pain- mild, generalized bowel distention suggesting ileus.  Diet changed to clears as tolerated for bowel rest   - SHANE ordered for feeds.

## 2018-01-14 NOTE — PROGRESS NOTES
[facility]  Infectious Disease  Progress Note    Patient Name: Jhonny Diana  MRN: [id number]  Admission Date: 1/11/2018  Length of Stay: 3 days  Attending Physician: Nathanael Medina MD  Primary Care Provider: Primary Doctor No    Isolation Status: No active isolations  Assessment/Plan:      * Fever    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+, steroid induction, on maintenance tacro/MMF/pred; c/b seizures, LAURA, superificial wound infection s/p wound vac to [facility] through 1/12, and several recent admissions on 11/24 and 12/3 with culture negative peritonitis with peak WBC ~5k due to suspected indolent biliary leakage s/p sphincterotomy/biliary stent placement over CBD stricture on 12/6 ERCP although notably with no overt leak noted during procedure) who was admitted on 1/11/2017 with acute onset fevers and acute on chronic N/V/abdominal pain (RLQ worst) in the setting of persistent noninflammatory ascites of unknown etiology. He underwent drainage of pleural fluid and ascites that did not show evidence of gross infection but his fevers have abated and he has shown modest clinical improvement on empiric cefepime in house. Differential for fevers and chronic N/V/abdominal pain include: indolent bile leak (with still abnormal LFT's and known stent in place), walled off abdominal abscess (not yet accessed by peritoneal drain/blind tap as ascites counts noninflammatory), CMV infection (as valcyte on hold), or another OI (as he is now almost 3mos post-transplant). He is slightly improved on empiric coverage in house.    - would continue empiric cefepime for now and observe fever curve, symptoms, LFT's on therapy through weekend  - await pending CMV quant  - if CMV is negative, may need additional evaluation of biliary system by ERCP if LFT's not normalized and symptoms persist despite the above            Anticipated Disposition:  pending improvement    Thank you for your consult. I will follow-up with patient. Please contact us if you have any additional questions.     Melanie Bergman MD  Transplant ID Attending  [phone]    Melanie Bergman MD  Infectious Disease  [facility]    Subjective:     Principal Problem:Fever    HPI: No notes on file  Interval History: Doing slightly better again today overall. Still with some RLQ abdominal pain and nausea. Afebrile.    Review of Systems   Constitutional: Negative for fever.   Cardiovascular: Positive for leg swelling.   Gastrointestinal: Positive for abdominal pain. Negative for diarrhea, nausea and vomiting.   Genitourinary:        Anuric     Allergic/Immunologic: Positive for immunocompromised state.     Objective:     Vital Signs (Most Recent):  Temp: 98.1 °F (36.7 °C) (01/14/18 1306)  Pulse: 93 (01/14/18 1306)  Resp: 18 (01/14/18 1306)  BP: 108/65 (01/14/18 1306)  SpO2: 100 % (01/14/18 1306) Vital Signs (24h Range):  Temp:  [98.1 °F (36.7 °C)-99.2 °F (37.3 °C)] 98.1 °F (36.7 °C)  Pulse:  [86-97] 93  Resp:  [15-18] 18  SpO2:  [95 %-100 %] 100 %  BP: (104-118)/(58-71) 108/65     Weight: 62.3 kg (137 lb 5.6 oz)  Body mass index is 22.17 kg/m².    Estimated Creatinine Clearance: 27.7 mL/min (based on SCr of 3.5 mg/dL (H)).    Physical Exam   Constitutional: He is oriented to person, place, and time. He appears well-developed. No distress.   Temporal and distal extremity muscle wasting   HENT:   Head: Normocephalic and atraumatic.   Mouth/Throat: No oropharyngeal exudate.   Eyes: EOM are normal. Pupils are equal, round, and reactive to light. Scleral icterus is present.   Neck: Normal range of motion. Neck supple. No JVD present. No thyromegaly present.   Cardiovascular: Normal rate, regular rhythm, normal heart sounds and intact distal pulses.    No murmur heard.  Pulmonary/Chest: Effort normal. No respiratory distress. He has decreased breath sounds in the right middle field,  the right lower field and the left lower field. He has no wheezes. He exhibits no tenderness.   Diminished to RLL   Abdominal: Soft. Bowel sounds are normal. He exhibits distension and ascites. There is tenderness (mild). There is no rebound and no guarding.   Wound vac removed.   Musculoskeletal: Normal range of motion. He exhibits edema (2+ LE edema). He exhibits no tenderness.   Neurological: He is alert and oriented to person, place, and time. He has normal reflexes.   Skin: Skin is warm and dry. Capillary refill takes 2 to 3 seconds. He is not diaphoretic. No erythema.   jaundice   Psychiatric: He has a normal mood and affect. His behavior is normal. Judgment and thought content normal. His mood appears not anxious.   Nursing note and vitals reviewed.      Significant Labs:   CBC:     Recent Labs  Lab 01/13/18  0438 01/14/18  0708   WBC 3.23* 3.25*   HGB 9.2* 8.7*   HCT 27.7* 25.7*    205     CMP:     Recent Labs  Lab 01/13/18  0438 01/14/18  0453    135*   K 3.8 3.8    104   CO2 27 23   GLU 81 73   BUN 8 14   CREATININE 2.7* 3.5*   CALCIUM 8.1* 8.5*   PROT 4.3* 4.3*   ALBUMIN 1.5* 1.5*   BILITOT 2.7* 2.6*   ALKPHOS 133 124   AST 19 22   ALT <5* 5*   ANIONGAP 8 8   EGFRNONAA 30.7* 22.4*       Significant Imaging: I have reviewed all pertinent imaging results/findings within the past 24 hours.       Microbiology:  1/11 blood cx: negative  1/11 pleural fluid cx: negative  1/11 ascites cx; negative

## 2018-01-14 NOTE — PLAN OF CARE
Problem: Patient Care Overview  Goal: Plan of Care Review  Outcome: Ongoing (interventions implemented as appropriate)  Pt is AAOx4, free from fall/injury so far this shift, in bed wearing non-skid footwear, bed in low/locked position, commode at bedside, call bell next to pt, mother at bedside. Pt VSS, pain managed c ordered prn oxy PO. Pt is afebrile at this time. Proper hand hygiene performed before and after pt care activities. Pt received cefepime IV per orders. RLQ grenade d/c'd on day shift yd. Abd chevron covered c mepilex, CDI, next change is tues 01/17. Next HD possibly tomorrow, pt on MWF schedule. Pt RS tunnel cath, dressing CDI. Pt had repeat KUB yd on day shift 2/2 increase in abd pain, showed mild generalized bowel distention, diet change to clear liq for bowel rest. Pt c/o N/V on day shift yd, will possibly have to have NGT placed again if persist, c/o mild nausea once this shift, ordered prn zofran IV given, relief obtained. Pt will possibly need repeat ERCP 2/2 t.bili mildly continuously elevated, LTS will discuss c PBS on Mon. Pt reminded to use call bell to call for assistance, pt verbalizes understanding. Will continue to monitor.

## 2018-01-15 ENCOUNTER — TELEPHONE (OUTPATIENT)
Dept: GASTROENTEROLOGY | Facility: CLINIC | Age: 28
End: 2018-01-15

## 2018-01-15 LAB
ALBUMIN SERPL BCP-MCNC: 1.8 G/DL
ALP SERPL-CCNC: 106 U/L
ALT SERPL W/O P-5'-P-CCNC: <5 U/L
ANION GAP SERPL CALC-SCNC: 10 MMOL/L
AST SERPL-CCNC: 16 U/L
BACTERIA SPEC AEROBE CULT: NO GROWTH
BASOPHILS # BLD AUTO: 0.02 K/UL
BASOPHILS NFR BLD: 0.7 %
BILIRUB SERPL-MCNC: 2.2 MG/DL
BUN SERPL-MCNC: 21 MG/DL
CALCIUM SERPL-MCNC: 8.8 MG/DL
CHLORIDE SERPL-SCNC: 101 MMOL/L
CMV DNA SERPL NAA+PROBE-ACNC: NORMAL IU/ML
CO2 SERPL-SCNC: 22 MMOL/L
CREAT SERPL-MCNC: 4.3 MG/DL
CYCLOSPORINE BLD LC/MS/MS-MCNC: 237 NG/ML
DIFFERENTIAL METHOD: ABNORMAL
EOSINOPHIL # BLD AUTO: 0.2 K/UL
EOSINOPHIL NFR BLD: 5 %
ERYTHROCYTE [DISTWIDTH] IN BLOOD BY AUTOMATED COUNT: 16.7 %
EST. GFR  (AFRICAN AMERICAN): 20.2 ML/MIN/1.73 M^2
EST. GFR  (NON AFRICAN AMERICAN): 17.5 ML/MIN/1.73 M^2
GLUCOSE SERPL-MCNC: 82 MG/DL
HCT VFR BLD AUTO: 24.3 %
HGB BLD-MCNC: 8 G/DL
IMM GRANULOCYTES # BLD AUTO: 0.02 K/UL
IMM GRANULOCYTES NFR BLD AUTO: 0.7 %
LYMPHOCYTES # BLD AUTO: 1.2 K/UL
LYMPHOCYTES NFR BLD: 38.7 %
MAGNESIUM SERPL-MCNC: 2.1 MG/DL
MCH RBC QN AUTO: 30.7 PG
MCHC RBC AUTO-ENTMCNC: 32.9 G/DL
MCV RBC AUTO: 93 FL
MONOCYTES # BLD AUTO: 0.7 K/UL
MONOCYTES NFR BLD: 24.3 %
NEUTROPHILS # BLD AUTO: 0.9 K/UL
NEUTROPHILS NFR BLD: 30.6 %
NRBC BLD-RTO: 0 /100 WBC
PLATELET # BLD AUTO: 194 K/UL
PLATELET BLD QL SMEAR: ABNORMAL
PMV BLD AUTO: 9.8 FL
POTASSIUM SERPL-SCNC: 3.4 MMOL/L
PREALB SERPL-MCNC: 7 MG/DL
PROT SERPL-MCNC: 4.3 G/DL
RBC # BLD AUTO: 2.61 M/UL
SODIUM SERPL-SCNC: 133 MMOL/L
WBC # BLD AUTO: 3 K/UL

## 2018-01-15 PROCEDURE — 83735 ASSAY OF MAGNESIUM: CPT

## 2018-01-15 PROCEDURE — 85025 COMPLETE CBC W/AUTO DIFF WBC: CPT

## 2018-01-15 PROCEDURE — 63600175 PHARM REV CODE 636 W HCPCS: Performed by: HOSPITALIST

## 2018-01-15 PROCEDURE — P9047 ALBUMIN (HUMAN), 25%, 50ML: HCPCS | Mod: JG | Performed by: PHYSICIAN ASSISTANT

## 2018-01-15 PROCEDURE — 80053 COMPREHEN METABOLIC PANEL: CPT

## 2018-01-15 PROCEDURE — 36415 COLL VENOUS BLD VENIPUNCTURE: CPT

## 2018-01-15 PROCEDURE — 63600175 PHARM REV CODE 636 W HCPCS: Performed by: NURSE PRACTITIONER

## 2018-01-15 PROCEDURE — 25000003 PHARM REV CODE 250: Performed by: NURSE PRACTITIONER

## 2018-01-15 PROCEDURE — 63600175 PHARM REV CODE 636 W HCPCS: Performed by: PHYSICIAN ASSISTANT

## 2018-01-15 PROCEDURE — 25000003 PHARM REV CODE 250: Performed by: INTERNAL MEDICINE

## 2018-01-15 PROCEDURE — 90935 HEMODIALYSIS ONE EVALUATION: CPT

## 2018-01-15 PROCEDURE — 90935 HEMODIALYSIS ONE EVALUATION: CPT | Mod: ,,, | Performed by: INTERNAL MEDICINE

## 2018-01-15 PROCEDURE — 63600175 PHARM REV CODE 636 W HCPCS: Performed by: SURGERY

## 2018-01-15 PROCEDURE — P9047 ALBUMIN (HUMAN), 25%, 50ML: HCPCS | Mod: JG | Performed by: NURSE PRACTITIONER

## 2018-01-15 PROCEDURE — 20600001 HC STEP DOWN PRIVATE ROOM

## 2018-01-15 PROCEDURE — 99232 SBSQ HOSP IP/OBS MODERATE 35: CPT | Mod: ,,, | Performed by: INTERNAL MEDICINE

## 2018-01-15 PROCEDURE — 96372 THER/PROPH/DIAG INJ SC/IM: CPT

## 2018-01-15 PROCEDURE — 25000003 PHARM REV CODE 250: Performed by: SURGERY

## 2018-01-15 PROCEDURE — 84134 ASSAY OF PREALBUMIN: CPT

## 2018-01-15 PROCEDURE — 80158 DRUG ASSAY CYCLOSPORINE: CPT

## 2018-01-15 RX ORDER — ALBUMIN HUMAN 250 G/1000ML
25 SOLUTION INTRAVENOUS EVERY 8 HOURS
Status: COMPLETED | OUTPATIENT
Start: 2018-01-15 | End: 2018-01-16

## 2018-01-15 RX ORDER — BISACODYL 10 MG
10 SUPPOSITORY, RECTAL RECTAL ONCE
Status: DISCONTINUED | OUTPATIENT
Start: 2018-01-16 | End: 2018-01-15

## 2018-01-15 RX ORDER — BISACODYL 10 MG
10 SUPPOSITORY, RECTAL RECTAL ONCE
Status: COMPLETED | OUTPATIENT
Start: 2018-01-16 | End: 2018-01-16

## 2018-01-15 RX ADMIN — OXYCODONE HYDROCHLORIDE 10 MG: 5 TABLET ORAL at 07:01

## 2018-01-15 RX ADMIN — PANTOPRAZOLE SODIUM 40 MG: 40 TABLET, DELAYED RELEASE ORAL at 06:01

## 2018-01-15 RX ADMIN — LEVETIRACETAM 500 MG: 500 TABLET ORAL at 05:01

## 2018-01-15 RX ADMIN — ERYTHROPOIETIN 10000 UNITS: 10000 INJECTION, SOLUTION INTRAVENOUS; SUBCUTANEOUS at 10:01

## 2018-01-15 RX ADMIN — ALBUMIN (HUMAN) 25 G: 12.5 SOLUTION INTRAVENOUS at 05:01

## 2018-01-15 RX ADMIN — THERA TABS 1 TABLET: TAB at 05:01

## 2018-01-15 RX ADMIN — Medication 100 MG: at 06:01

## 2018-01-15 RX ADMIN — HEPARIN SODIUM 5000 UNITS: 5000 INJECTION, SOLUTION INTRAVENOUS; SUBCUTANEOUS at 10:01

## 2018-01-15 RX ADMIN — ONDANSETRON 8 MG: 8 TABLET, ORALLY DISINTEGRATING ORAL at 07:01

## 2018-01-15 RX ADMIN — ALBUMIN (HUMAN) 25 G: 12.5 SOLUTION INTRAVENOUS at 06:01

## 2018-01-15 RX ADMIN — LEVOTHYROXINE SODIUM 75 MCG: 75 TABLET ORAL at 06:01

## 2018-01-15 RX ADMIN — ALBUMIN (HUMAN) 25 G: 12.5 SOLUTION INTRAVENOUS at 10:01

## 2018-01-15 RX ADMIN — CEFEPIME HYDROCHLORIDE 1 G: 1 INJECTION, SOLUTION INTRAVENOUS at 04:01

## 2018-01-15 RX ADMIN — OXYCODONE HYDROCHLORIDE 10 MG: 5 TABLET ORAL at 05:01

## 2018-01-15 RX ADMIN — ONDANSETRON HYDROCHLORIDE 4 MG: 2 INJECTION, SOLUTION INTRAMUSCULAR; INTRAVENOUS at 10:01

## 2018-01-15 RX ADMIN — CYCLOSPORINE 75 MG: 25 CAPSULE, LIQUID FILLED ORAL at 06:01

## 2018-01-15 RX ADMIN — SODIUM CHLORIDE 100 ML: 900 INJECTION, SOLUTION INTRAVENOUS at 11:01

## 2018-01-15 RX ADMIN — OXYCODONE HYDROCHLORIDE 10 MG: 5 TABLET ORAL at 12:01

## 2018-01-15 RX ADMIN — OXYCODONE HYDROCHLORIDE 10 MG: 5 TABLET ORAL at 02:01

## 2018-01-15 RX ADMIN — LEVETIRACETAM 500 MG: 500 TABLET ORAL at 10:01

## 2018-01-15 RX ADMIN — URSODIOL 300 MG: 300 CAPSULE ORAL at 10:01

## 2018-01-15 RX ADMIN — URSODIOL 300 MG: 300 CAPSULE ORAL at 06:01

## 2018-01-15 RX ADMIN — OXYCODONE HYDROCHLORIDE 10 MG: 5 TABLET ORAL at 10:01

## 2018-01-15 RX ADMIN — HEPARIN SODIUM 1000 UNITS: 1000 INJECTION, SOLUTION INTRAVENOUS; SUBCUTANEOUS at 01:01

## 2018-01-15 RX ADMIN — ATOVAQUONE 1500 MG: 750 SUSPENSION ORAL at 06:01

## 2018-01-15 NOTE — NURSING
Soap suds enema 500ml given by ORQUIDEA Lozano RN and ORQUIDEA Calles RN. Pt had some stool after. Continuing to monitor.

## 2018-01-15 NOTE — ASSESSMENT & PLAN NOTE
- responded appropriately to 1 u PRBC given 1/12.  H/H stable. Type and screen ordered for AM.   - Trend H&H daily.

## 2018-01-15 NOTE — ASSESSMENT & PLAN NOTE
- post op course complicated by fevers and hyperbilirubinemia  - ERCP 12/6 with post-anastomosis stricture with stent placement  - AST/ALT normal. Tbili/alkphos remain elevated  - Liver US 1/8 showed 3.9cm complex fluid collection anterior to right lobe and moderate nonspecific complex ascites inferior to transplant.   - IR placed drain 1/11/18, cell count negative for infection.  - As T bili remains elevated and pt with previous stricture, he may need a repeat ERCP in near future.   - Currently 5 weeks out from previous ERCP.   -Bilirubin currently improving. Will continue to monitor for need for repeat ERCP sooner than scheduled ERCP.

## 2018-01-15 NOTE — PROGRESS NOTES
Patient received from floor with report from Anayeli DIAZ RN. Maintenance dialysis began per orders via right IJ tunneled catheter.

## 2018-01-15 NOTE — ASSESSMENT & PLAN NOTE
- wd vac removed 1/12/18. Wound care following. Wound healing well. Aquacel AG and Mepilex border dressing applied via wound care recs.

## 2018-01-15 NOTE — PROGRESS NOTES
EZEKIEL faxed [facility] ([phone], fax [phone]) wound documentation that pts wound vac was removed on 1/12/17, since [facility] was requesting documentation of why patient needed to continue with wound vac for insurance purposes.   SW remains available

## 2018-01-15 NOTE — ASSESSMENT & PLAN NOTE
- HD resumed on previous admission. Now anuric and dialyzes M-W-F.  - Nephrology following.  HD today.

## 2018-01-15 NOTE — ASSESSMENT & PLAN NOTE
- h/o constipation, reports having regular BM's with bowel regimen  - vomiting episode x 1 at home. Anti-emetics ordered PRN.  - KUB repeated 1/14/18 given increased abdominal pain- mild, generalized bowel distention suggesting ileus.  Diet changed to clears as tolerated for bowel rest   - SHANE ordered for feeds.   - Small BM 1/14 after brown bomb enema. Did have nausea/vomiting this AM. Tube feeds paused. Repeat KUB obtained showing that SHANE tube is coiled upon itselt in the stomach  -Will plan to retract the SHANE tube and re advance it once he is back on the floor to attempt to uncoil it.   -Plan for repeat KUB after re advancing the tube.   -Will also give soap suds enema today as remains with stool in colon

## 2018-01-15 NOTE — PROGRESS NOTES
[facility]  Liver Transplant  Progress Note    Patient Name: Jhonny Diana  MRN: [id number]  Admission Date: 2018  Hospital Length of Stay: 4 days  Code Status: Full Code  Primary Care Provider: Primary Doctor No  Post-Operative Day: 88    ORGAN:   LIVER  Disease Etiology: Acute Alcoholic Hepatitis  Donor Type:    - Brain Death  CDC High Risk:   No  Donor CMV Status:   Donor CMV Status: Positive  Donor HBcAB:   Negative  Donor HCV Status:   Negative  Whole or Partial: Whole Liver  Biliary Anastomosis: End to End  Arterial Anatomy: Standard  Subjective:     History of Present Illness:  Jhonny Diana is a 26 y/o male with past medical history of alcoholic cirrhosis.  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 Found to have peritonitis in November (WBC 5000, 75% PNM) neg for bile leak. He was treated initially with vanc/cefepime. Repeat cell counts  with some improvement (WBC 1400, 45% PNM). He has undergone multiple paracenteses as well as abscess drainage of perihepatic fluid collections and treated with antimicrobial therapy but no positive cultures. Of note, biliary stricture also identified and ERCP performed on 2017 with sphincterotomy and biliary stent placed. Liver tests still have not normalized despite intervention, bilirubin and AP remain elevated. Other pertinent PMH current wound vac in place 2/2 wound infection, malnutrition requiring TPN for short course and ongoing hypoalbuminemia, and seizure activity while on prograf and has since been switched to cyclosporine without reoccurrence.  He presented to the ER for fever, abdominal pain, and N/V. He reports fever (103) for 1 day  prior. Overnight, he developed N/V, reports small amount of green emesis with new left sided pain. He also endorses worsening SOB with exertion. He was scheduled as an outpatient for follow up paracentesis and IR drainage of fluid collection. CXR in ER shows large pleural effusion with subsegmental atelectasis. Infectious work up initiated in ER. His ANC is 900. Broad spectrum antibiotics initiated in ED. He denies chest pain, palpitations, diarrhea, constipation, or back pain. Denies any sick contacts.    Hospital Course:  Interval History: Patient continues with abdominal pain. Did have small BM yesterday after brown bomb enema. Did have nausea/vomiting this AM. Tube feeds paused. Repeat KUB obtained showing that SHANE tube is coiled upon itselt in the stomach. Patient currently down in HD. Will plan to retract the SHANE tube and re advance it once he is back on the floor to attempt to uncoil it. Plan for repeat KUB after re advancing the tube. Will also give soap suds enema today as remains with stool in colon. Plan for albumin 25 % 25 gr x 3 again today. If continues with abdominal pain, nausea, then will plan for repeat CT A/P tomorrow. Monitor.    Scheduled Meds:   albumin human 25%  25 g Intravenous Q8H    atovaquone  1,500 mg Oral Daily    cycloSPORINE modified (NEORAL)  75 mg Oral BID    epoetin maurisio (PROCRIT) injection  10,000 Units Intravenous Every Mon, Wed, Fri    ergocalciferol  50,000 Units Oral Q7 Days    heparin (porcine)  5,000 Units Subcutaneous Q12H    levETIRAcetam  500 mg Oral BID    levothyroxine  75 mcg Oral Before breakfast    mirtazapine  7.5 mg Oral QHS    multivitamin  1 tablet Oral Daily    pantoprazole  40 mg Oral Daily    thiamine  100 mg Oral Daily    ursodiol  300 mg Oral BID     Continuous Infusions:  PRN Meds:sodium chloride 0.9%, acetaminophen, docusate sodium, heparin (porcine), ondansetron, ondansetron, oxyCODONE, oxyCODONE, prochlorperazine, sodium chloride  0.9%    Review of Systems   Constitutional: Positive for activity change, appetite change and fever. Negative for chills and fatigue.   HENT: Negative.    Respiratory: Negative for cough, shortness of breath and wheezing.    Cardiovascular: Positive for leg swelling. Negative for chest pain.   Gastrointestinal: Positive for abdominal distention and abdominal pain. Negative for constipation, nausea and vomiting.   Genitourinary: Negative for difficulty urinating and dysuria.        Anuric   Musculoskeletal: Negative for arthralgias, back pain and myalgias.   Skin: Positive for wound. Negative for color change and rash.   Allergic/Immunologic: Positive for immunocompromised state.   Neurological: Positive for weakness. Negative for dizziness, seizures and headaches.   Psychiatric/Behavioral: Negative for confusion and decreased concentration. The patient is not nervous/anxious.      Objective:     Vital Signs (Most Recent):  Temp: 98 °F (36.7 °C) (01/15/18 1330)  Pulse: 81 (01/15/18 1330)  Resp: 18 (01/15/18 1330)  BP: 117/70 (01/15/18 1330)  SpO2: 98 % (01/15/18 1330) Vital Signs (24h Range):  Temp:  [98 °F (36.7 °C)-99.6 °F (37.6 °C)] 98 °F (36.7 °C)  Pulse:  [78-92] 81  Resp:  [15-18] 18  SpO2:  [96 %-98 %] 98 %  BP: (105-133)/(61-75) 117/70     Weight: 62.3 kg (137 lb 5.6 oz)  Body mass index is 22.17 kg/m².    Intake/Output - Last 3 Shifts       01/13 0700 - 01/14 0659 01/14 0700 - 01/15 0659 01/15 0700 - 01/16 0659    P.O. 460 460     I.V. (mL/kg) 0 (0) 0 (0)     Other 0 0 600    NG/GT   20    IV Piggyback 100 150     Total Intake(mL/kg) 560 (9) 610 (9.8) 620 (10)    Urine (mL/kg/hr) 100 (0.1) 150 (0.1)     Emesis/NG output 0 (0) 0 (0)     Drains       Other 0 (0) 0 (0) 1600 (3.5)    Stool 0 (0) 0 (0)     Blood 0 (0) 0 (0)     Total Output     Net +460 +460 -980           Urine Occurrence 0 x 0 x     Stool Occurrence 0 x 1 x     Emesis Occurrence 0 x 0 x           Physical Exam   Constitutional: He  is oriented to person, place, and time. He appears well-developed. No distress.   Temporal and distal extremity muscle wasting   HENT:   Head: Normocephalic and atraumatic.   Mouth/Throat: No oropharyngeal exudate.   Eyes: EOM are normal. Pupils are equal, round, and reactive to light. Scleral icterus is present.   Neck: Normal range of motion. Neck supple. No JVD present. No thyromegaly present.   Cardiovascular: Normal rate, regular rhythm, normal heart sounds and intact distal pulses.    No murmur heard.  Pulmonary/Chest: Effort normal. No respiratory distress. He has decreased breath sounds in the right middle field, the right lower field and the left lower field. He has no wheezes. He exhibits no tenderness.   Diminished to RLL   Abdominal: Soft. Bowel sounds are normal. He exhibits distension and ascites. There is tenderness (mild). There is no rebound and no guarding.   Dressing to chevron.  Wd vac removed 1/12/18   Musculoskeletal: Normal range of motion. He exhibits edema (2+ LE edema). He exhibits no tenderness.   Neurological: He is alert and oriented to person, place, and time. He has normal reflexes.   Skin: Skin is warm and dry. Capillary refill takes 2 to 3 seconds. He is not diaphoretic. No erythema.   jaundice   Psychiatric: He has a normal mood and affect. His behavior is normal. Judgment and thought content normal. His mood appears not anxious.   Nursing note and vitals reviewed.      Laboratory:  Immunosuppressants         Stop Route Frequency     cycloSPORINE modified (NEORAL) capsule 75 mg      -- Oral 2 times daily        CBC:     Recent Labs  Lab 01/15/18  0606   WBC 3.00*   RBC 2.61*   HGB 8.0*   HCT 24.3*      MCV 93   MCH 30.7   MCHC 32.9     CMP:     Recent Labs  Lab 01/15/18  0606   GLU 82   CALCIUM 8.8   ALBUMIN 1.8*   PROT 4.3*   *   K 3.4*   CO2 22*      BUN 21*   CREATININE 4.3*   ALKPHOS 106   ALT <5*   AST 16   BILITOT 2.2*     Coagulation:     Recent Labs  Lab  01/11/18  0434   INR 1.2     Cyclosporine, LC/MS   Date Value Ref Range Status   01/15/2018 237 100 - 400 ng/mL Final     Comment:     Reference Normals:  For Kidney Transplants: 100-300 ng/mL  Testing performed by Liquid Chromatography-Tandem  Mass Spectrometry (LC-MS/MS).  This test was developed and its performance characteristics  determined by [facility], Department of Pathology  and Laboratory Medicine in a manner consistent with CLIA  requirements. It has not been cleared or approved by the [place]  Food and Drug Administration.  This test is used for clinical  purposes.  It should not be regarded as investigational or for  research.     01/14/2018 178 100 - 400 ng/mL Final     Comment:     Reference Normals:  For Kidney Transplants: 100-300 ng/mL  Testing performed by Liquid Chromatography-Tandem  Mass Spectrometry (LC-MS/MS).  This test was developed and its performance characteristics  determined by [facility], Department of Pathology  and Laboratory Medicine in a manner consistent with CLIA  requirements. It has not been cleared or approved by the [place]  Food and Drug Administration.  This test is used for clinical  purposes.  It should not be regarded as investigational or for  research.     01/13/2018 391 100 - 400 ng/mL Final     Comment:     Reference Normals:  For Kidney Transplants: 100-300 ng/mL  Testing performed by Liquid Chromatography-Tandem  Mass Spectrometry (LC-MS/MS).  This test was developed and its performance characteristics  determined by [facility], Department of Pathology  and Laboratory Medicine in a manner consistent with CLIA  requirements. It has not been cleared or approved by the [place]  Food and Drug Administration.  This test is used for clinical  purposes.  It should not be regarded as investigational or for  research.     01/12/2018 244 100 - 400 ng/mL Final     Comment:     Reference Normals:  For Kidney Transplants: 100-300 ng/mL  Testing performed  by Liquid Chromatography-Tandem  Mass Spectrometry (LC-MS/MS).  This test was developed and its performance characteristics  determined by [facility], Department of Pathology  and Laboratory Medicine in a manner consistent with CLIA  requirements. It has not been cleared or approved by the [place]  Food and Drug Administration.  This test is used for clinical  purposes.  It should not be regarded as investigational or for  research.         Labs within the past 24 hours have been reviewed.    Diagnostic Results:  I have personally reviewed all pertinent imaging studies.    Assessment/Plan:     * Fever    - unclear source of fever, previously thought to be related to peritonitis  - blood cultures prelim NGTD. No urine cx obtained as pt anuric.   - Pt underwent thoracentesis, paracentesis, and drain placement into fluid collection 1/11/18.   - All fluids reviewed and negative for infection. F/u on cultures.   - ID following. Cefepime d/c today per surgeon. Cont to monitor.           Other ascites    - Paracentesis performed 1/11/18 with 1600 ml removed.    - fluid negative for infection.           Severe protein-calorie malnutrition    - consult dietician.  - SHANE and tube feeds ordered          Neutropenia    -  1/11/18.    - Infectious work up negative so far.   - Cont with BS antibxs as with fevers on admit.   - CMV PCR from 1/11/18 pending. [place] just received CMV PCR on 1/15/18. Monitor.        Nausea and vomiting    - h/o constipation, reports having regular BM's with bowel regimen  - vomiting episode x 1 at home. Anti-emetics ordered PRN.  - KUB repeated 1/14/18 given increased abdominal pain- mild, generalized bowel distention suggesting ileus.  Diet changed to clears as tolerated for bowel rest   - SHANE ordered for feeds.   - Small BM 1/14 after brown bomb enema. Did have nausea/vomiting this AM. Tube feeds paused. Repeat KUB obtained showing that SHANE tube is coiled upon itselt in the  stomach  -Will plan to retract the SHANE tube and re advance it once he is back on the floor to attempt to uncoil it.   -Plan for repeat KUB after re advancing the tube.   -Will also give soap suds enema today as remains with stool in colon          Pleural effusion, right    - CXR in ER with large right pleural effusion with subjective c/o worsening SOB  - thoracentesis completed- 1200 ml removed.  SOB with significant improvement.   - Cell count reviewed and negative for infection.   - resp even and non labored.  O2 sat 94-98%.        Protein-calorie malnutrition    - Poor PO intake since transplant requiring short course of TPN during previous hospital stay.   - albumin remains decreased but appetite slowly improving per pt.   - Dietary consulted. Will need to closely monitor PO intake.   - supplements also ordered.   - SHANE and tube feeds ordered.  - prealbumin in am.          Peritonitis    - initially diagnosed in November but has since had improved cell counts in recent para's  - IR placed drain in fluid collection and paracentesis performed 1/11/18. Both fluids reviewed and negative for infection based on cell counts.   - F/u on cultures.         Anemia of chronic disease    - responded appropriately to 1 u PRBC given 1/12.  H/H stable. Type and screen ordered for AM.   - Trend H&H daily.         Delayed surgical wound healing    - wd vac removed 1/12/18. Wound care following. Wound healing well. Aquacel AG and Mepilex border dressing applied via wound care recs.           At risk for opportunistic infections    - hold valcyte as WBC low.  CMV PCR pending.  - Continue Bactrim for PCP proph  - isavu for fungal prophylaxis 2/2 complicated post-op course        Long-term use of immunosuppressant medication    - Maintenance IS with cyclosporine. MMF on hold for infections and neutropenia. cont to check cyclo level daily. Assess for toxicity and adjust level as needed        Prophylactic immunotherapy    See long  term use of immunosuppression.           Liver transplanted    - post op course complicated by fevers and hyperbilirubinemia  - ERCP 12/6 with post-anastomosis stricture with stent placement  - AST/ALT normal. Tbili/alkphos remain elevated  - Liver US 1/8 showed 3.9cm complex fluid collection anterior to right lobe and moderate nonspecific complex ascites inferior to transplant.   - IR placed drain 1/11/18, cell count negative for infection.  - As T bili remains elevated and pt with previous stricture, he may need a repeat ERCP in near future.   - Currently 5 weeks out from previous ERCP.   -Bilirubin currently improving. Will continue to monitor for need for repeat ERCP sooner than scheduled ERCP.         Acute renal failure with tubular necrosis    - HD resumed on previous admission. Now anuric and dialyzes M-W-F.  - Nephrology following.  HD today.             VTE Risk Mitigation         Ordered     heparin (porcine) injection 1,000 Units  As needed (PRN)     Route:  Intra-Catheter        01/12/18 1017     heparin (porcine) injection 5,000 Units  Every 12 hours     Route:  Subcutaneous        01/12/18 0926     Medium Risk of VTE  Once      01/11/18 0351     Place sequential compression device  Until discontinued      01/11/18 0351          The patients clinical status was discussed at multidisplinary rounds, involving transplant surgery, transplant medicine, pharmacy, nursing, nutrition, and social work    Discharge Planning: Not a candidate for discharge at this time.     Monitor kidney function --> may need outpt HD  Monitor HH needs vs rehab for deconditioned status      Citlali Bridges PA-C  Liver Transplant  [facility]

## 2018-01-15 NOTE — ASSESSMENT & PLAN NOTE
-  1/11/18.    - Infectious work up negative so far.   - Cont with BS antibxs as with fevers on admit.   - CMV PCR from 1/11/18 pending. [place] just received CMV PCR on 1/15/18. Monitor.

## 2018-01-15 NOTE — TELEPHONE ENCOUNTER
Spoke with patient's mother, Elvira. ERCP scheduled for 2/15 at 8:30a. Reviewed prep instructions. Elvira verbalized understanding. Patient is currently in the hospital. If ERCP is done while he is in, we will cancel this procedure.

## 2018-01-15 NOTE — PROGRESS NOTES
[facility]     Patient currently on hemodialysis for removal of uremic toxins .     Patient seen and evaluated on hemodialysis, tolerating treatment, see HD flowsheet for vitals and assessments.      No Hypotension, chest pain, shortness of breath, cramping, nausea or vomiting. Tolerating 1 L net UF.     Kay Guillen NP  Nephrology

## 2018-01-15 NOTE — SUBJECTIVE & OBJECTIVE
Scheduled Meds:   albumin human 25%  25 g Intravenous Q8H    atovaquone  1,500 mg Oral Daily    cycloSPORINE modified (NEORAL)  75 mg Oral BID    epoetin maurisio (PROCRIT) injection  10,000 Units Intravenous Every Mon, Wed, Fri    ergocalciferol  50,000 Units Oral Q7 Days    heparin (porcine)  5,000 Units Subcutaneous Q12H    levETIRAcetam  500 mg Oral BID    levothyroxine  75 mcg Oral Before breakfast    mirtazapine  7.5 mg Oral QHS    multivitamin  1 tablet Oral Daily    pantoprazole  40 mg Oral Daily    thiamine  100 mg Oral Daily    ursodiol  300 mg Oral BID     Continuous Infusions:  PRN Meds:sodium chloride 0.9%, acetaminophen, docusate sodium, heparin (porcine), ondansetron, ondansetron, oxyCODONE, oxyCODONE, prochlorperazine, sodium chloride 0.9%    Review of Systems   Constitutional: Positive for activity change, appetite change and fever. Negative for chills and fatigue.   HENT: Negative.    Respiratory: Negative for cough, shortness of breath and wheezing.    Cardiovascular: Positive for leg swelling. Negative for chest pain.   Gastrointestinal: Positive for abdominal distention and abdominal pain. Negative for constipation, nausea and vomiting.   Genitourinary: Negative for difficulty urinating and dysuria.        Anuric   Musculoskeletal: Negative for arthralgias, back pain and myalgias.   Skin: Positive for wound. Negative for color change and rash.   Allergic/Immunologic: Positive for immunocompromised state.   Neurological: Positive for weakness. Negative for dizziness, seizures and headaches.   Psychiatric/Behavioral: Negative for confusion and decreased concentration. The patient is not nervous/anxious.      Objective:     Vital Signs (Most Recent):  Temp: 98 °F (36.7 °C) (01/15/18 1330)  Pulse: 81 (01/15/18 1330)  Resp: 18 (01/15/18 1330)  BP: 117/70 (01/15/18 1330)  SpO2: 98 % (01/15/18 1330) Vital Signs (24h Range):  Temp:  [98 °F (36.7 °C)-99.6 °F (37.6 °C)] 98 °F (36.7 °C)  Pulse:   [78-92] 81  Resp:  [15-18] 18  SpO2:  [96 %-98 %] 98 %  BP: (105-133)/(61-75) 117/70     Weight: 62.3 kg (137 lb 5.6 oz)  Body mass index is 22.17 kg/m².    Intake/Output - Last 3 Shifts       01/13 0700 - 01/14 0659 01/14 0700 - 01/15 0659 01/15 0700 - 01/16 0659    P.O. 460 460     I.V. (mL/kg) 0 (0) 0 (0)     Other 0 0 600    NG/GT   20    IV Piggyback 100 150     Total Intake(mL/kg) 560 (9) 610 (9.8) 620 (10)    Urine (mL/kg/hr) 100 (0.1) 150 (0.1)     Emesis/NG output 0 (0) 0 (0)     Drains       Other 0 (0) 0 (0) 1600 (3.5)    Stool 0 (0) 0 (0)     Blood 0 (0) 0 (0)     Total Output     Net +460 +460 -980           Urine Occurrence 0 x 0 x     Stool Occurrence 0 x 1 x     Emesis Occurrence 0 x 0 x           Physical Exam   Constitutional: He is oriented to person, place, and time. He appears well-developed. No distress.   Temporal and distal extremity muscle wasting   HENT:   Head: Normocephalic and atraumatic.   Mouth/Throat: No oropharyngeal exudate.   Eyes: EOM are normal. Pupils are equal, round, and reactive to light. Scleral icterus is present.   Neck: Normal range of motion. Neck supple. No JVD present. No thyromegaly present.   Cardiovascular: Normal rate, regular rhythm, normal heart sounds and intact distal pulses.    No murmur heard.  Pulmonary/Chest: Effort normal. No respiratory distress. He has decreased breath sounds in the right middle field, the right lower field and the left lower field. He has no wheezes. He exhibits no tenderness.   Diminished to RLL   Abdominal: Soft. Bowel sounds are normal. He exhibits distension and ascites. There is tenderness (mild). There is no rebound and no guarding.   Dressing to chevron.  Wd vac removed 1/12/18   Musculoskeletal: Normal range of motion. He exhibits edema (2+ LE edema). He exhibits no tenderness.   Neurological: He is alert and oriented to person, place, and time. He has normal reflexes.   Skin: Skin is warm and dry. Capillary refill  takes 2 to 3 seconds. He is not diaphoretic. No erythema.   jaundice   Psychiatric: He has a normal mood and affect. His behavior is normal. Judgment and thought content normal. His mood appears not anxious.   Nursing note and vitals reviewed.      Laboratory:  Immunosuppressants         Stop Route Frequency     cycloSPORINE modified (NEORAL) capsule 75 mg      -- Oral 2 times daily        CBC:     Recent Labs  Lab 01/15/18  0606   WBC 3.00*   RBC 2.61*   HGB 8.0*   HCT 24.3*      MCV 93   MCH 30.7   MCHC 32.9     CMP:     Recent Labs  Lab 01/15/18  0606   GLU 82   CALCIUM 8.8   ALBUMIN 1.8*   PROT 4.3*   *   K 3.4*   CO2 22*      BUN 21*   CREATININE 4.3*   ALKPHOS 106   ALT <5*   AST 16   BILITOT 2.2*     Coagulation:     Recent Labs  Lab 01/11/18  0434   INR 1.2     Cyclosporine, LC/MS   Date Value Ref Range Status   01/15/2018 237 100 - 400 ng/mL Final     Comment:     Reference Normals:  For Kidney Transplants: 100-300 ng/mL  Testing performed by Liquid Chromatography-Tandem  Mass Spectrometry (LC-MS/MS).  This test was developed and its performance characteristics  determined by [facility], Department of Pathology  and Laboratory Medicine in a manner consistent with CLIA  requirements. It has not been cleared or approved by the [place]  Food and Drug Administration.  This test is used for clinical  purposes.  It should not be regarded as investigational or for  research.     01/14/2018 178 100 - 400 ng/mL Final     Comment:     Reference Normals:  For Kidney Transplants: 100-300 ng/mL  Testing performed by Liquid Chromatography-Tandem  Mass Spectrometry (LC-MS/MS).  This test was developed and its performance characteristics  determined by [facility], Department of Pathology  and Laboratory Medicine in a manner consistent with CLIA  requirements. It has not been cleared or approved by the [place]  Food and Drug Administration.  This test is used for clinical  purposes.  It  should not be regarded as investigational or for  research.     01/13/2018 391 100 - 400 ng/mL Final     Comment:     Reference Normals:  For Kidney Transplants: 100-300 ng/mL  Testing performed by Liquid Chromatography-Tandem  Mass Spectrometry (LC-MS/MS).  This test was developed and its performance characteristics  determined by [facility], Department of Pathology  and Laboratory Medicine in a manner consistent with CLIA  requirements. It has not been cleared or approved by the [place]  Food and Drug Administration.  This test is used for clinical  purposes.  It should not be regarded as investigational or for  research.     01/12/2018 244 100 - 400 ng/mL Final     Comment:     Reference Normals:  For Kidney Transplants: 100-300 ng/mL  Testing performed by Liquid Chromatography-Tandem  Mass Spectrometry (LC-MS/MS).  This test was developed and its performance characteristics  determined by [facility], Department of Pathology  and Laboratory Medicine in a manner consistent with CLIA  requirements. It has not been cleared or approved by the [place]  Food and Drug Administration.  This test is used for clinical  purposes.  It should not be regarded as investigational or for  research.         Labs within the past 24 hours have been reviewed.    Diagnostic Results:  I have personally reviewed all pertinent imaging studies.

## 2018-01-15 NOTE — PROGRESS NOTES
[facility]  Infectious Disease  Progress Note    Patient Name: Jhonny Diana  MRN: [id number]  Admission Date: 1/11/2018  Length of Stay: 4 days  Attending Physician: Nathanael Medina MD  Primary Care Provider: Primary Doctor No    Isolation Status: No active isolations  Assessment/Plan:      * Fever    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+, steroid induction, on maintenance tacro/MMF/pred; c/b seizures, LAURA, superificial wound infection s/p wound vac to [facility] through 1/12, and several recent admissions on 11/24 and 12/3 with culture negative peritonitis with peak WBC ~5k due to suspected indolent biliary leakage s/p sphincterotomy/biliary stent placement over CBD stricture on 12/6 ERCP although notably with no overt leak noted during procedure) who was admitted on 1/11/2017 with acute onset fevers and acute on chronic N/V/abdominal pain (RLQ worst) in the setting of persistent noninflammatory ascites of unknown etiology. He underwent drainage of pleural fluid and ascites that did not show evidence of gross infection but his fevers have abated and he has shown modest clinical improvement on empiric cefepime in house and with supportive care. Etiology remains unknown with improved LFT's (no large obstruction on imaging), negative taps, and negative CMV. He is stable.    - agree with observation off antibiotics  - please call me back should symptoms recur to further guide workup -- would start with evaluation of biliary tract (MRCP vs ERCP) first            Anticipated Disposition: per primary team    Thank you for your consult. I will sign off. Please contact us if you have any additional questions.     Melanie Bergman MD  Transplant ID Attending  [phone]    Melanie Bergman MD  Infectious Disease  [facility]    Subjective:     Principal Problem:Fever    HPI: No notes on file  Interval History: Doing  slightly better again today overall. Afebrile. LFT's improving. CMV negative.    Review of Systems   Constitutional: Negative for fever.   Cardiovascular: Positive for leg swelling.   Gastrointestinal: Positive for abdominal pain. Negative for diarrhea, nausea and vomiting.   Genitourinary:        Anuric     Allergic/Immunologic: Positive for immunocompromised state.     Objective:     Vital Signs (Most Recent):  Temp: 98.8 °F (37.1 °C) (01/15/18 1505)  Pulse: 110 (01/15/18 1505)  Resp: 16 (01/15/18 1505)  BP: 112/67 (01/15/18 1505)  SpO2: 98 % (01/15/18 1505) Vital Signs (24h Range):  Temp:  [98 °F (36.7 °C)-99.6 °F (37.6 °C)] 98.8 °F (37.1 °C)  Pulse:  [] 110  Resp:  [15-18] 16  SpO2:  [96 %-98 %] 98 %  BP: (105-133)/(61-75) 112/67     Weight: 62.3 kg (137 lb 5.6 oz)  Body mass index is 22.17 kg/m².    Estimated Creatinine Clearance: 22.5 mL/min (based on SCr of 4.3 mg/dL (H)).    Physical Exam   Constitutional: He is oriented to person, place, and time. He appears well-developed. No distress.   Temporal and distal extremity muscle wasting   HENT:   Head: Normocephalic and atraumatic.   Mouth/Throat: No oropharyngeal exudate.   Eyes: EOM are normal. Pupils are equal, round, and reactive to light. Scleral icterus is present.   Neck: Normal range of motion. Neck supple. No JVD present. No thyromegaly present.   Cardiovascular: Normal rate, regular rhythm, normal heart sounds and intact distal pulses.    No murmur heard.  Pulmonary/Chest: Effort normal. No respiratory distress. He has decreased breath sounds in the right middle field, the right lower field and the left lower field. He has no wheezes. He exhibits no tenderness.   Diminished to RLL   Abdominal: Soft. Bowel sounds are normal. He exhibits distension and ascites. There is tenderness (mild). There is no rebound and no guarding.   Wound vac removed.   Musculoskeletal: Normal range of motion. He exhibits edema (2+ LE edema). He exhibits no tenderness.    Neurological: He is alert and oriented to person, place, and time. He has normal reflexes.   Skin: Skin is warm and dry. Capillary refill takes 2 to 3 seconds. He is not diaphoretic. No erythema.   jaundice   Psychiatric: He has a normal mood and affect. His behavior is normal. Judgment and thought content normal. His mood appears not anxious.   Nursing note and vitals reviewed.      Significant Labs:   CBC:     Recent Labs  Lab 01/14/18  0708 01/15/18  0606   WBC 3.25* 3.00*   HGB 8.7* 8.0*   HCT 25.7* 24.3*    194     CMP:     Recent Labs  Lab 01/14/18  0453 01/15/18  0606   * 133*   K 3.8 3.4*    101   CO2 23 22*   GLU 73 82   BUN 14 21*   CREATININE 3.5* 4.3*   CALCIUM 8.5* 8.8   PROT 4.3* 4.3*   ALBUMIN 1.5* 1.8*   BILITOT 2.6* 2.2*   ALKPHOS 124 106   AST 22 16   ALT 5* <5*   ANIONGAP 8 10   EGFRNONAA 22.4* 17.5*       Significant Imaging: I have reviewed all pertinent imaging results/findings within the past 24 hours.       Microbiology:  1/11 blood cx: negative  1/11 pleural fluid cx: negative  1/11 ascites cx; negative

## 2018-01-15 NOTE — SUBJECTIVE & OBJECTIVE
Interval History: Doing slightly better again today overall. Afebrile. LFT's improving. CMV negative.    Review of Systems   Constitutional: Negative for fever.   Cardiovascular: Positive for leg swelling.   Gastrointestinal: Positive for abdominal pain. Negative for diarrhea, nausea and vomiting.   Genitourinary:        Anuric     Allergic/Immunologic: Positive for immunocompromised state.     Objective:     Vital Signs (Most Recent):  Temp: 98.8 °F (37.1 °C) (01/15/18 1505)  Pulse: 110 (01/15/18 1505)  Resp: 16 (01/15/18 1505)  BP: 112/67 (01/15/18 1505)  SpO2: 98 % (01/15/18 1505) Vital Signs (24h Range):  Temp:  [98 °F (36.7 °C)-99.6 °F (37.6 °C)] 98.8 °F (37.1 °C)  Pulse:  [] 110  Resp:  [15-18] 16  SpO2:  [96 %-98 %] 98 %  BP: (105-133)/(61-75) 112/67     Weight: 62.3 kg (137 lb 5.6 oz)  Body mass index is 22.17 kg/m².    Estimated Creatinine Clearance: 22.5 mL/min (based on SCr of 4.3 mg/dL (H)).    Physical Exam   Constitutional: He is oriented to person, place, and time. He appears well-developed. No distress.   Temporal and distal extremity muscle wasting   HENT:   Head: Normocephalic and atraumatic.   Mouth/Throat: No oropharyngeal exudate.   Eyes: EOM are normal. Pupils are equal, round, and reactive to light. Scleral icterus is present.   Neck: Normal range of motion. Neck supple. No JVD present. No thyromegaly present.   Cardiovascular: Normal rate, regular rhythm, normal heart sounds and intact distal pulses.    No murmur heard.  Pulmonary/Chest: Effort normal. No respiratory distress. He has decreased breath sounds in the right middle field, the right lower field and the left lower field. He has no wheezes. He exhibits no tenderness.   Diminished to RLL   Abdominal: Soft. Bowel sounds are normal. He exhibits distension and ascites. There is tenderness (mild). There is no rebound and no guarding.   Wound vac removed.   Musculoskeletal: Normal range of motion. He exhibits edema (2+ LE edema). He  exhibits no tenderness.   Neurological: He is alert and oriented to person, place, and time. He has normal reflexes.   Skin: Skin is warm and dry. Capillary refill takes 2 to 3 seconds. He is not diaphoretic. No erythema.   jaundice   Psychiatric: He has a normal mood and affect. His behavior is normal. Judgment and thought content normal. His mood appears not anxious.   Nursing note and vitals reviewed.      Significant Labs:   CBC:     Recent Labs  Lab 01/14/18  0708 01/15/18  0606   WBC 3.25* 3.00*   HGB 8.7* 8.0*   HCT 25.7* 24.3*    194     CMP:     Recent Labs  Lab 01/14/18  0453 01/15/18  0606   * 133*   K 3.8 3.4*    101   CO2 23 22*   GLU 73 82   BUN 14 21*   CREATININE 3.5* 4.3*   CALCIUM 8.5* 8.8   PROT 4.3* 4.3*   ALBUMIN 1.5* 1.8*   BILITOT 2.6* 2.2*   ALKPHOS 124 106   AST 22 16   ALT 5* <5*   ANIONGAP 8 10   EGFRNONAA 22.4* 17.5*       Significant Imaging: I have reviewed all pertinent imaging results/findings within the past 24 hours.       Microbiology:  1/11 blood cx: negative  1/11 pleural fluid cx: negative  1/11 ascites cx; negative

## 2018-01-15 NOTE — PROGRESS NOTES
This RN gave pt 5mg oxycodone PO around 2100 per pts request, pt asked if pain was not relieved will he be able to received additional dose. This RN explained that if the patient needs additional dose of pain meds the MD will be notified and this RN will follow MD's directives. Pt called stated his pain began increasing after activity and requesting addition 5mg dose of prn oxycodone. This RN called the txp resident, jaleesa Pena MD and explained situation. Jean TEJADA gave the ok to give pt additional oxycodone 5mg PO dose. This RN explained to pt that pt will not be able to receive another dose until 4 hours from time of this administration, pt verbalized understanding. Will continue to monitor.

## 2018-01-15 NOTE — ASSESSMENT & PLAN NOTE
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+, steroid induction, on maintenance tacro/MMF/pred; c/b seizures, LAURA, superificial wound infection s/p wound vac to [facility] through 1/12, and several recent admissions on 11/24 and 12/3 with culture negative peritonitis with peak WBC ~5k due to suspected indolent biliary leakage s/p sphincterotomy/biliary stent placement over CBD stricture on 12/6 ERCP although notably with no overt leak noted during procedure) who was admitted on 1/11/2017 with acute onset fevers and acute on chronic N/V/abdominal pain (RLQ worst) in the setting of persistent noninflammatory ascites of unknown etiology. He underwent drainage of pleural fluid and ascites that did not show evidence of gross infection but his fevers have abated and he has shown modest clinical improvement on empiric cefepime in house and with supportive care. Etiology remains unknown with improved LFT's (no large obstruction on imaging), negative taps, and negative CMV. He is stable.    - agree with observation off antibiotics  - please call me back should symptoms recur to further guide workup -- would start with evaluation of biliary tract (MRCP vs ERCP) first

## 2018-01-15 NOTE — ASSESSMENT & PLAN NOTE
- unclear source of fever, previously thought to be related to peritonitis  - blood cultures prelim NGTD. No urine cx obtained as pt anuric.   - Pt underwent thoracentesis, paracentesis, and drain placement into fluid collection 1/11/18.   - All fluids reviewed and negative for infection. F/u on cultures.   - ID following. Cefepime d/c today per surgeon. Cont to monitor.

## 2018-01-15 NOTE — PLAN OF CARE
Problem: Patient Care Overview  Goal: Plan of Care Review  Outcome: Ongoing (interventions implemented as appropriate)  Pt is AAOx4, free from fall/injury so far this shift, in bed wearing non-skid footwear, bed in low/locked position, commode at bedside, call bell next to pt, mother at bedside. Pt VSS, pain managed c ordered prn oxy PO. Pt is afebrile at this time. Proper hand hygiene performed before and after pt care activities. Pt received cefepime IV per orders. Abd chevron covered c mepilex, CDI, next change is tomorrow. Next HD td, pt on MWF schedule. Pt RS tunnel cath, dressing CDI. Pt given brown bomb enema yd on day shift 2/2 large amount of stool seen on KUB from 1/13. Pt had dylan tube placed yd on day shift 2/2 poor nutrition intake. Novasource titrated up this shift by 5cc q2h from start to goal of 30, currently running @30cc/hr and pt tolerating well. Pt will possibly need repeat ERCP 2/2 t.bili mildly continuously elevated, LTS will discuss c PBS td. Pt reminded to use call bell to call for assistance, pt verbalizes understanding. Will continue to monitor.

## 2018-01-15 NOTE — PROGRESS NOTES
This RN went into pt room to give requested pain medicine. Pt c/o nausea, tube feed paused, oncoming nurse Anayeli SHEEHAN in room, aware. Pain medicine handed off to Anayeli SHEEHAN until nausea subsides.

## 2018-01-15 NOTE — PROGRESS NOTES
Dialysis completed. Right IJ tunneled catheter flushed with normal saline, heparinized, capped and taped. Patient dialyzed for 3.5 hours with fluid removal of 1 liter. Tolerated well with stable vital signs.

## 2018-01-15 NOTE — ASSESSMENT & PLAN NOTE
- Infectious work up negative so far, dc antibiotics 1/15.  - CMV PCR from 1/11/18 not detected. Monitor.  -  today, neupogen given.

## 2018-01-16 ENCOUNTER — ANESTHESIA EVENT (OUTPATIENT)
Dept: ENDOSCOPY | Facility: HOSPITAL | Age: 28
DRG: 628 | End: 2018-01-16
Payer: COMMERCIAL

## 2018-01-16 PROBLEM — R79.89 ELEVATED LIVER FUNCTION TESTS: Status: ACTIVE | Noted: 2018-01-16

## 2018-01-16 LAB
ABO + RH BLD: NORMAL
ALBUMIN SERPL BCP-MCNC: 2.4 G/DL
ALP SERPL-CCNC: 97 U/L
ALT SERPL W/O P-5'-P-CCNC: <5 U/L
ANION GAP SERPL CALC-SCNC: 8 MMOL/L
ANISOCYTOSIS BLD QL SMEAR: SLIGHT
AST SERPL-CCNC: 16 U/L
BACTERIA BLD CULT: NORMAL
BACTERIA BLD CULT: NORMAL
BASOPHILS # BLD AUTO: 0.02 K/UL
BASOPHILS NFR BLD: 0.8 %
BILIRUB SERPL-MCNC: 2.5 MG/DL
BLD GP AB SCN CELLS X3 SERPL QL: NORMAL
BUN SERPL-MCNC: 11 MG/DL
CALCIUM SERPL-MCNC: 8.8 MG/DL
CHLORIDE SERPL-SCNC: 105 MMOL/L
CO2 SERPL-SCNC: 22 MMOL/L
CREAT SERPL-MCNC: 2.9 MG/DL
CYCLOSPORINE BLD LC/MS/MS-MCNC: 168 NG/ML
DIFFERENTIAL METHOD: ABNORMAL
EOSINOPHIL # BLD AUTO: 0.2 K/UL
EOSINOPHIL NFR BLD: 6 %
ERYTHROCYTE [DISTWIDTH] IN BLOOD BY AUTOMATED COUNT: 16.6 %
EST. GFR  (AFRICAN AMERICAN): 32.5 ML/MIN/1.73 M^2
EST. GFR  (NON AFRICAN AMERICAN): 28.1 ML/MIN/1.73 M^2
GLUCOSE SERPL-MCNC: 60 MG/DL
HCT VFR BLD AUTO: 23.4 %
HGB BLD-MCNC: 7.5 G/DL
IMM GRANULOCYTES # BLD AUTO: 0.01 K/UL
IMM GRANULOCYTES NFR BLD AUTO: 0.4 %
LYMPHOCYTES # BLD AUTO: 1 K/UL
LYMPHOCYTES NFR BLD: 39.2 %
MAGNESIUM SERPL-MCNC: 1.9 MG/DL
MCH RBC QN AUTO: 29.8 PG
MCHC RBC AUTO-ENTMCNC: 32.1 G/DL
MCV RBC AUTO: 93 FL
MONOCYTES # BLD AUTO: 0.7 K/UL
MONOCYTES NFR BLD: 27.5 %
NEUTROPHILS # BLD AUTO: 0.7 K/UL
NEUTROPHILS NFR BLD: 26.1 %
NRBC BLD-RTO: 0 /100 WBC
OVALOCYTES BLD QL SMEAR: ABNORMAL
PLATELET # BLD AUTO: 199 K/UL
PLATELET BLD QL SMEAR: ABNORMAL
PMV BLD AUTO: 10.2 FL
POCT GLUCOSE: 110 MG/DL (ref 70–110)
POIKILOCYTOSIS BLD QL SMEAR: SLIGHT
POLYCHROMASIA BLD QL SMEAR: ABNORMAL
POTASSIUM SERPL-SCNC: 3.8 MMOL/L
PROT SERPL-MCNC: 4.7 G/DL
RBC # BLD AUTO: 2.52 M/UL
SODIUM SERPL-SCNC: 135 MMOL/L
WBC # BLD AUTO: 2.65 K/UL

## 2018-01-16 PROCEDURE — P9047 ALBUMIN (HUMAN), 25%, 50ML: HCPCS | Mod: JG | Performed by: NURSE PRACTITIONER

## 2018-01-16 PROCEDURE — 86850 RBC ANTIBODY SCREEN: CPT

## 2018-01-16 PROCEDURE — 25000003 PHARM REV CODE 250: Performed by: NURSE PRACTITIONER

## 2018-01-16 PROCEDURE — 99253 IP/OBS CNSLTJ NEW/EST LOW 45: CPT | Mod: ,,, | Performed by: INTERNAL MEDICINE

## 2018-01-16 PROCEDURE — 97116 GAIT TRAINING THERAPY: CPT

## 2018-01-16 PROCEDURE — 25000003 PHARM REV CODE 250: Performed by: PHYSICIAN ASSISTANT

## 2018-01-16 PROCEDURE — 99233 SBSQ HOSP IP/OBS HIGH 50: CPT | Mod: 24,,, | Performed by: NURSE PRACTITIONER

## 2018-01-16 PROCEDURE — B548ZZA ULTRASONOGRAPHY OF SUPERIOR VENA CAVA, GUIDANCE: ICD-10-PCS | Performed by: TRANSPLANT SURGERY

## 2018-01-16 PROCEDURE — 63600175 PHARM REV CODE 636 W HCPCS: Performed by: NURSE PRACTITIONER

## 2018-01-16 PROCEDURE — P9047 ALBUMIN (HUMAN), 25%, 50ML: HCPCS | Mod: JG | Performed by: PHYSICIAN ASSISTANT

## 2018-01-16 PROCEDURE — 20600001 HC STEP DOWN PRIVATE ROOM

## 2018-01-16 PROCEDURE — 63600175 PHARM REV CODE 636 W HCPCS: Performed by: PHYSICIAN ASSISTANT

## 2018-01-16 PROCEDURE — A4217 STERILE WATER/SALINE, 500 ML: HCPCS | Performed by: NURSE PRACTITIONER

## 2018-01-16 PROCEDURE — 86920 COMPATIBILITY TEST SPIN: CPT

## 2018-01-16 PROCEDURE — 85025 COMPLETE CBC W/AUTO DIFF WBC: CPT

## 2018-01-16 PROCEDURE — 63600175 PHARM REV CODE 636 W HCPCS: Mod: JG | Performed by: PHYSICIAN ASSISTANT

## 2018-01-16 PROCEDURE — 83735 ASSAY OF MAGNESIUM: CPT

## 2018-01-16 PROCEDURE — B4185 PARENTERAL SOL 10 GM LIPIDS: HCPCS | Performed by: NURSE PRACTITIONER

## 2018-01-16 PROCEDURE — 02HV33Z INSERTION OF INFUSION DEVICE INTO SUPERIOR VENA CAVA, PERCUTANEOUS APPROACH: ICD-10-PCS | Performed by: TRANSPLANT SURGERY

## 2018-01-16 PROCEDURE — 97161 PT EVAL LOW COMPLEX 20 MIN: CPT

## 2018-01-16 PROCEDURE — 97803 MED NUTRITION INDIV SUBSEQ: CPT | Performed by: DIETITIAN, REGISTERED

## 2018-01-16 PROCEDURE — 80053 COMPREHEN METABOLIC PANEL: CPT

## 2018-01-16 PROCEDURE — 80158 DRUG ASSAY CYCLOSPORINE: CPT

## 2018-01-16 RX ORDER — ALBUMIN HUMAN 250 G/1000ML
25 SOLUTION INTRAVENOUS ONCE
Status: COMPLETED | OUTPATIENT
Start: 2018-01-16 | End: 2018-01-16

## 2018-01-16 RX ORDER — DOCUSATE SODIUM 100 MG/1
100 CAPSULE, LIQUID FILLED ORAL 3 TIMES DAILY
Status: DISCONTINUED | OUTPATIENT
Start: 2018-01-16 | End: 2018-01-18

## 2018-01-16 RX ORDER — POLYETHYLENE GLYCOL 3350 17 G/17G
17 POWDER, FOR SOLUTION ORAL DAILY
Status: DISCONTINUED | OUTPATIENT
Start: 2018-01-16 | End: 2018-01-25

## 2018-01-16 RX ORDER — BISACODYL 5 MG
10 TABLET, DELAYED RELEASE (ENTERIC COATED) ORAL NIGHTLY
Status: DISCONTINUED | OUTPATIENT
Start: 2018-01-16 | End: 2018-01-18

## 2018-01-16 RX ORDER — LORAZEPAM 2 MG/ML
0.5 INJECTION INTRAMUSCULAR ONCE
Status: COMPLETED | OUTPATIENT
Start: 2018-01-16 | End: 2018-01-16

## 2018-01-16 RX ADMIN — Medication 100 MG: at 08:01

## 2018-01-16 RX ADMIN — SODIUM CHLORIDE 1.5 G: 9 INJECTION, SOLUTION INTRAVENOUS at 02:01

## 2018-01-16 RX ADMIN — URSODIOL 300 MG: 300 CAPSULE ORAL at 09:01

## 2018-01-16 RX ADMIN — DOCUSATE SODIUM 100 MG: 100 CAPSULE, LIQUID FILLED ORAL at 02:01

## 2018-01-16 RX ADMIN — SODIUM CHLORIDE 1.5 G: 9 INJECTION, SOLUTION INTRAVENOUS at 09:01

## 2018-01-16 RX ADMIN — URSODIOL 300 MG: 300 CAPSULE ORAL at 08:01

## 2018-01-16 RX ADMIN — CYCLOSPORINE 75 MG: 25 CAPSULE, LIQUID FILLED ORAL at 08:01

## 2018-01-16 RX ADMIN — ALBUMIN (HUMAN) 25 G: 12.5 SOLUTION INTRAVENOUS at 05:01

## 2018-01-16 RX ADMIN — DOCUSATE SODIUM 100 MG: 100 CAPSULE, LIQUID FILLED ORAL at 09:01

## 2018-01-16 RX ADMIN — ALBUMIN (HUMAN) 25 G: 12.5 SOLUTION INTRAVENOUS at 03:01

## 2018-01-16 RX ADMIN — CALCIUM GLUCONATE: 94 INJECTION, SOLUTION INTRAVENOUS at 09:01

## 2018-01-16 RX ADMIN — BISACODYL 10 MG: 5 TABLET, COATED ORAL at 09:01

## 2018-01-16 RX ADMIN — BISACODYL 10 MG: 10 SUPPOSITORY RECTAL at 12:01

## 2018-01-16 RX ADMIN — LEVETIRACETAM 500 MG: 500 TABLET ORAL at 08:01

## 2018-01-16 RX ADMIN — HEPARIN SODIUM 5000 UNITS: 5000 INJECTION, SOLUTION INTRAVENOUS; SUBCUTANEOUS at 08:01

## 2018-01-16 RX ADMIN — PANTOPRAZOLE SODIUM 40 MG: 40 TABLET, DELAYED RELEASE ORAL at 08:01

## 2018-01-16 RX ADMIN — ATOVAQUONE 1500 MG: 750 SUSPENSION ORAL at 08:01

## 2018-01-16 RX ADMIN — DOCUSATE SODIUM 100 MG: 100 CAPSULE, LIQUID FILLED ORAL at 08:01

## 2018-01-16 RX ADMIN — LORAZEPAM 0.5 MG: 2 INJECTION, SOLUTION INTRAMUSCULAR; INTRAVENOUS at 05:01

## 2018-01-16 RX ADMIN — OXYCODONE HYDROCHLORIDE 10 MG: 5 TABLET ORAL at 02:01

## 2018-01-16 RX ADMIN — ONDANSETRON HYDROCHLORIDE 4 MG: 2 INJECTION, SOLUTION INTRAMUSCULAR; INTRAVENOUS at 08:01

## 2018-01-16 RX ADMIN — LEVETIRACETAM 500 MG: 500 TABLET ORAL at 09:01

## 2018-01-16 RX ADMIN — SOYBEAN OIL 250 ML: 20 INJECTION, SOLUTION INTRAVENOUS at 09:01

## 2018-01-16 RX ADMIN — OXYCODONE HYDROCHLORIDE 10 MG: 5 TABLET ORAL at 08:01

## 2018-01-16 RX ADMIN — ONDANSETRON HYDROCHLORIDE 4 MG: 2 INJECTION, SOLUTION INTRAMUSCULAR; INTRAVENOUS at 05:01

## 2018-01-16 RX ADMIN — THERA TABS 1 TABLET: TAB at 08:01

## 2018-01-16 RX ADMIN — LEVOTHYROXINE SODIUM 75 MCG: 75 TABLET ORAL at 05:01

## 2018-01-16 RX ADMIN — HEPARIN SODIUM 5000 UNITS: 5000 INJECTION, SOLUTION INTRAVENOUS; SUBCUTANEOUS at 09:01

## 2018-01-16 RX ADMIN — TBO-FILGRASTIM 300 MCG: 300 INJECTION, SOLUTION SUBCUTANEOUS at 11:01

## 2018-01-16 RX ADMIN — OXYCODONE HYDROCHLORIDE 10 MG: 5 TABLET ORAL at 04:01

## 2018-01-16 RX ADMIN — POLYETHYLENE GLYCOL 3350 17 G: 17 POWDER, FOR SOLUTION ORAL at 11:01

## 2018-01-16 RX ADMIN — CYCLOSPORINE 100 MG: 100 CAPSULE, LIQUID FILLED ORAL at 05:01

## 2018-01-16 NOTE — PROGRESS NOTES
[facility]  Adult Nutrition  Progress Note    SUMMARY     Recommendations    Recommendation/Intervention: Continue TPN until enteral nutrition is feasible. Minimize time spent without adequate nutrition support.      TF Recommendations: Novasource renal @ goal rate of 45mL/hr to provide 2160kcal, 98g protein, and 774mL fluid. Continue to advance to new goal rate by increasing by 10mL/hr q 2hrs. Additional water flushes per MD. Hold for residuals >500mL.      Current TPN provision inadequate to meet EEN and EPN. Recommend 90g AA / 379g dextrose to better meet patients needs.     Pt has been asking for solid food - recommend advancing diet but keeping TF running at goal rate of 45mL/hr until oral intake meets >75% of meals consistently.     Reason for Assessment    Reason for Assessment: RD follow-up  Diagnosis: transplant/postoperative complications  Relevent Medical History: OLTx 10/19/2017, complicate post-op w/ LAURA ongoing, chronic malnutrition, cirrhosis 2' EtOH   Interdisciplinary Rounds: attended     General Information Comments: TF ordered via dylan and started this am at 6:30am at 10mL, now at 15mL; to be advanced by 5 q2hrs to goal of 30ml; on clear liquids, BMs with enemas, drinking ~24oz water/day, HD 3x/week, abdominal pain, fatigue, ambulating daily    Nutrition Discharge Planning: Adequate nutrition PO vs EN    Nutrition Prescription Ordered    Current Diet Order: Renal  Current Nutrition Support Formula Ordered: Novasource Renal  Current Nutrition Support Rate Ordered: 30 (ml)  Current Nutrition Support Frequency Ordered: mL/hr    Parenteral Nutrition Support Ordered: Custom TPN 58g AA / 225 g dex   Parenteral Nutrition Support Rate: 40mL/hr        Evaluation of Received Nutrients/Fluid Intake    Enteral Calories (kcal): 1440   Enteral Protein (gm): 65  Enteral (Free Water) Fluid (mL): 516     Parenteral Calories Ordered: 997 kcal  Parenteral Protein (g): 58 g  Lipid Calories: 500  "kcal  Total Calories: 1497 kcal  Glucose Infusion Rate: 2.72 g/kg/min    Parenteral Fluid: 960mL    Energy Calories Required: not meeting needs  % Kcal Needs: 72      Protein Required: not meeting needs  % Protein Needs: 72      I/O: -3.8L since admit  Fluid Required:  (per MD)  Comments: LBM 1/15  Tolerance: not tolerating  % Intake of Estimated Energy Needs: 25 - 50 %  % Meal Intake: 0%    Nutrition/Diet History    Patient Reported Diet/Restrictions/Preferences: low salt  Typical Food/Fluid Intake: eats very little at this time  Food Preferences: No cultural or Christianity food preferences noted  Meal/Snack Patterns: pt likes to cook, when feeling well will cook and then only eat bites  Supplemental Drinks or Food Habits:  (protein powders sometimes)  Factors Affecting Nutritional Intake: abdominal distention, altered gastrointestinal function, decreased appetite     Labs/Tests/Procedures/Meds    Pertinent Labs Reviewed: reviewed, pertinent  Pertinent Labs Comments: GFR 28.1, Tbili 2.5, glu 60, Na 135  Pertinent Medications Reviewed: reviewed, pertinent  Pertinent Medications Comments: thiamin, pantoprazole, epoetin, bisacodyl, ergocalciferol, MVI    Physical Findings    Overall Physical Appearance: generalized wasting, loss of muscle mass, loss of subcutaneous fat, weak     Oral/Mouth Cavity: WDL  Skin: intact    Anthropometrics    Temp: 98.8 °F (37.1 °C)     Height: 5' 6" (167.6 cm)  Weight Method: Bed Scale  Weight: 57.5 kg (126 lb 12.2 oz)  Ideal Body Weight (IBW), Male: 142 lb     % Ideal Body Weight, Male (lb): 112.68 lb     BMI (Calculated): 25.9     Usual Body Weight (UBW), k kg (2017 )     % Usual Body Weight: 86.71  % Weight Change From Usual Weight: -13.47 %    Estimated/Assessed Needs    Weight Used For Calorie Calculations: 62.3 kg (137 lb 5.6 oz)   Height (cm): 167.6 cm  Energy Calorie Requirements (kcal): 0382-6839 (30-35 kcal/kg)  Energy Need Method: Kcal/kg        RMR ([facility] [place] " Equation): 1535.75     Weight Used For Protein Calculations: 62.3 kg (137 lb 5.6 oz)  Protein Requirements: 81-94g (1.3-1.5 g/kg)    Fluid Requirements (mL): 1mL/kcal  Fluid Need Method: RDA Method     RDA Method (mL): 1869       Assessment and Plan    Severe protein-calorie malnutrition    Nutrition Diagnosis  Inadequate energy intake    Related to (etiology):   Decreased appetite    Signs and Symptoms (as evidenced by):   Pt meeting ~25% of needs at most, hollowing temples, protruding clavicle, visible scapula     Interventions/Recommendations (treatment strategy):  See recs    Nutrition Diagnosis Status:   Continues              Monitor and Evaluation    Food and Nutrient Intake: energy intake, food and beverage intake  Food and Nutrient Adminstration: diet order  Knowledge/Beliefs/Attitudes: food and nutrition knowledge/skill  Physical Activity and Function: nutrition-related ADLs and IADLs  Anthropometric Measurements: weight, weight change, body mass index  Biochemical Data, Medical Tests and Procedures: electrolyte and renal panel, lipid profile, gastrointestinal profile, glucose/endocrine profile, inflammatory profile  Nutrition-Focused Physical Findings: overall appearance    Nutrition Risk    Level of Risk:  (2x/week)    Nutrition Follow-Up    RD Follow-up?: Yes

## 2018-01-16 NOTE — SUBJECTIVE & OBJECTIVE
Past Medical History:   Diagnosis Date    Alcoholic hepatitis with ascites     Alcoholic hepatitis with ascites     History of hematemesis 9/28/2017    Hypertension     Renal disorder        Past Surgical History:   Procedure Laterality Date    APPENDECTOMY         Review of patient's allergies indicates:   Allergen Reactions    Bactrim [sulfamethoxazole-trimethoprim] Other (See Comments)     Mookie Trell Syndrome     Family History     None        Social History Main Topics    Smoking status: Former Smoker     Types: Cigarettes    Smokeless tobacco: Never Used    Alcohol use No      Comment: a fifth of liquor daily for years, cut back over last 2 months    Drug use: No    Sexual activity: Not on file     Review of Systems   Constitutional: Positive for fatigue and fever (at home). Negative for chills, diaphoresis and unexpected weight change.   HENT: Negative for trouble swallowing and voice change.    Respiratory: Negative for cough, choking and shortness of breath.    Cardiovascular: Negative for chest pain and leg swelling.   Gastrointestinal: Positive for abdominal pain, nausea and vomiting. Negative for abdominal distention, anal bleeding, blood in stool, constipation, diarrhea and rectal pain.   Genitourinary: Negative for dysuria and flank pain.   Musculoskeletal: Negative.    Skin: Negative for color change and pallor.   Neurological: Negative for dizziness and tremors.   Psychiatric/Behavioral: Negative for confusion and hallucinations.     Objective:     Vital Signs (Most Recent):  Temp: 98.8 °F (37.1 °C) (01/16/18 0800)  Pulse: 81 (01/16/18 0800)  Resp: 18 (01/16/18 0800)  BP: 110/68 (01/16/18 0800)  SpO2: 97 % (01/16/18 0800) Vital Signs (24h Range):  Temp:  [98 °F (36.7 °C)-99.5 °F (37.5 °C)] 98.8 °F (37.1 °C)  Pulse:  [] 81  Resp:  [16-20] 18  SpO2:  [96 %-98 %] 97 %  BP: (104-120)/(56-70) 110/68     Weight: 57.5 kg (126 lb 12.2 oz) (01/16/18 0500)  Body mass index is 20.46  kg/m².      Intake/Output Summary (Last 24 hours) at 01/16/18 1128  Last data filed at 01/16/18 0500   Gross per 24 hour   Intake             1160 ml   Output             1700 ml   Net             -540 ml       Lines/Drains/Airways     Central Venous Catheter Line                 Hemodialysis Catheter 10/30/17 1140 right internal jugular 78 days          Drain                 Trans Pyloric Feeding Tube 01/16/18 0529 Right nostril less than 1 day          Peripheral Intravenous Line                 Peripheral IV - Single Lumen 01/15/18 2000 Right Forearm less than 1 day                Physical Exam   Constitutional: He is oriented to person, place, and time. No distress.   HENT:   Mouth/Throat: Oropharynx is clear and moist. No oropharyngeal exudate.   Eyes: Conjunctivae are normal. No scleral icterus.   Neck: Neck supple. No tracheal deviation present.   Cardiovascular: Normal rate, regular rhythm and normal heart sounds.  Exam reveals no gallop and no friction rub.    No murmur heard.  Pulmonary/Chest: Effort normal and breath sounds normal. No respiratory distress. He has no wheezes. He has no rales.   Abdominal: Soft. Bowel sounds are normal. He exhibits no distension and no mass. There is tenderness (RUQ). There is no rebound and no guarding. No hernia.   Neurological: He is alert and oriented to person, place, and time.   Somnolent but rousable     Skin: Skin is warm and dry. He is not diaphoretic.   Psychiatric: He has a normal mood and affect.       Significant Labs:  CBC:   Recent Labs  Lab 01/15/18  0606 01/16/18  0601   WBC 3.00* 2.65*   HGB 8.0* 7.5*   HCT 24.3* 23.4*    199     CMP:   Recent Labs  Lab 01/16/18  0601   GLU 60*   CALCIUM 8.8   ALBUMIN 2.4*   PROT 4.7*   *   K 3.8   CO2 22*      BUN 11   CREATININE 2.9*   ALKPHOS 97   ALT <5*   AST 16   BILITOT 2.5*     Coagulation: No results for input(s): PT, INR, APTT in the last 48 hours.    Significant Imaging:  Imaging results  within the past 24 hours have been reviewed.

## 2018-01-16 NOTE — PLAN OF CARE
Problem: Patient Care Overview  Goal: Plan of Care Review  Outcome: Ongoing (interventions implemented as appropriate)  AAOx3, tmax 99.5, c/o pain.  Pain controlled by prn pain medication.  Removed SHANE due and will re attempt placing another SHANE this morning. Pt able to position self independently.   Pt understands the importance of being on a clear liquid diet.  Pt ambulated in hallways with mother x1.  Pt in lowest position, side rails up x2, non-skid foot wear in place, call light within reach, pt verbalized understanding to call RN when needed. Hand hygiene practiced per protocol. Will continue to monitor.

## 2018-01-16 NOTE — CONSULTS
[facility]  Advanced Endoscopy Service  Consult Note    Patient Name: Jhonny Diana  MRN: [id number]  Admission Date: 1/11/2018  Hospital Length of Stay: 5 days  Code Status: Full Code   Attending Provider: Nathanael Medina MD   Consulting Provider: Scooby Burk MD  Primary Care Physician: Primary Doctor No  Principal Problem:Fever    Inpatient consult to Advanced Endoscopy Service (AES)  Consult performed by: SCOOBY BURK  Consult ordered by: JOSÉ MIGUEL DURAN        Subjective:     HPI:  Mr. 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    The patient's postoperative course has been complicated by seizures (swtiched off prograf to cyclo), ATN requiring HD (M-W-F, last 1/10, anuric), superficial wound infection s/p wound vac to chevron incision, and multiple admissions for fevers on 11/21 (discharged on empiric augmentin for suspected superficial wound infection), readmitted 11/24 again with fever, and 12/3. Found to have peritonitis in November (WBC 5000, 75% PNM) neg for bile leak. He was treated initially with vanc/cefepime.  He has undergone multiple paracenteses as well as abscess drainage of perihepatic fluid collections and treated with antimicrobial therapy but no positive cultures.     The patient underwent an ERCP on 12/6/2017 which showed a 1mm biliary anastomotic stricture - one [id number] stent placed. Liver tests still have not normalized despite intervention, bilirubin and AP remain elevated.     He presented to the ER for fever, abdominal pain, and N/V. He reports fever (103) for 1 day prior. Overnight, he developed N/V, reports small amount of green emesis with new left sided pain. He also endorses worsening SOB with exertion.     Today the patient appears somnolent however able to answer questions appropriately.         Past Medical History:   Diagnosis Date    Alcoholic hepatitis  with ascites     Alcoholic hepatitis with ascites     History of hematemesis 9/28/2017    Hypertension     Renal disorder        Past Surgical History:   Procedure Laterality Date    APPENDECTOMY         Review of patient's allergies indicates:   Allergen Reactions    Bactrim [sulfamethoxazole-trimethoprim] Other (See Comments)     Mookie Trell Syndrome     Family History     None        Social History Main Topics    Smoking status: Former Smoker     Types: Cigarettes    Smokeless tobacco: Never Used    Alcohol use No      Comment: a fifth of liquor daily for years, cut back over last 2 months    Drug use: No    Sexual activity: Not on file     Review of Systems   Constitutional: Positive for fatigue and fever (at home). Negative for chills, diaphoresis and unexpected weight change.   HENT: Negative for trouble swallowing and voice change.    Respiratory: Negative for cough, choking and shortness of breath.    Cardiovascular: Negative for chest pain and leg swelling.   Gastrointestinal: Positive for abdominal pain, nausea and vomiting. Negative for abdominal distention, anal bleeding, blood in stool, constipation, diarrhea and rectal pain.   Genitourinary: Negative for dysuria and flank pain.   Musculoskeletal: Negative.    Skin: Negative for color change and pallor.   Neurological: Negative for dizziness and tremors.   Psychiatric/Behavioral: Negative for confusion and hallucinations.     Objective:     Vital Signs (Most Recent):  Temp: 98.8 °F (37.1 °C) (01/16/18 0800)  Pulse: 81 (01/16/18 0800)  Resp: 18 (01/16/18 0800)  BP: 110/68 (01/16/18 0800)  SpO2: 97 % (01/16/18 0800) Vital Signs (24h Range):  Temp:  [98 °F (36.7 °C)-99.5 °F (37.5 °C)] 98.8 °F (37.1 °C)  Pulse:  [] 81  Resp:  [16-20] 18  SpO2:  [96 %-98 %] 97 %  BP: (104-120)/(56-70) 110/68     Weight: 57.5 kg (126 lb 12.2 oz) (01/16/18 0500)  Body mass index is 20.46 kg/m².      Intake/Output Summary (Last 24 hours) at 01/16/18  1128  Last data filed at 01/16/18 0500   Gross per 24 hour   Intake             1160 ml   Output             1700 ml   Net             -540 ml       Lines/Drains/Airways     Central Venous Catheter Line                 Hemodialysis Catheter 10/30/17 1140 right internal jugular 78 days          Drain                 Trans Pyloric Feeding Tube 01/16/18 0529 Right nostril less than 1 day          Peripheral Intravenous Line                 Peripheral IV - Single Lumen 01/15/18 2000 Right Forearm less than 1 day                Physical Exam   Constitutional: He is oriented to person, place, and time. No distress.   HENT:   Mouth/Throat: Oropharynx is clear and moist. No oropharyngeal exudate.   Eyes: Conjunctivae are normal. No scleral icterus.   Neck: Neck supple. No tracheal deviation present.   Cardiovascular: Normal rate, regular rhythm and normal heart sounds.  Exam reveals no gallop and no friction rub.    No murmur heard.  Pulmonary/Chest: Effort normal and breath sounds normal. No respiratory distress. He has no wheezes. He has no rales.   Abdominal: Soft. Bowel sounds are normal. He exhibits no distension and no mass. There is tenderness (RUQ). There is no rebound and no guarding. No hernia.   Neurological: He is alert and oriented to person, place, and time.   Somnolent but rousable     Skin: Skin is warm and dry. He is not diaphoretic.   Psychiatric: He has a normal mood and affect.       Significant Labs:  CBC:   Recent Labs  Lab 01/15/18  0606 01/16/18  0601   WBC 3.00* 2.65*   HGB 8.0* 7.5*   HCT 24.3* 23.4*    199     CMP:   Recent Labs  Lab 01/16/18  0601   GLU 60*   CALCIUM 8.8   ALBUMIN 2.4*   PROT 4.7*   *   K 3.8   CO2 22*      BUN 11   CREATININE 2.9*   ALKPHOS 97   ALT <5*   AST 16   BILITOT 2.5*     Coagulation: No results for input(s): PT, INR, APTT in the last 48 hours.    Significant Imaging:  Imaging results within the past 24 hours have been reviewed.    Assessment/Plan:      Elevated liver function tests    May be 2/2 biliary stent occlusion vs intrahepatic process (LFTs never normalized after last ERCP) vs extrahepatic causes of elevated LFTs (ongoing wound infection, SBP - last tap 1/11 negative).    Will proceed with EUS+liver biopsy + ERCP tomorrow  Keep NPO/Stop tube feeds at midnight  Hold any prophylactic anticoagulation.                  Thank you for your consult. I will follow-up with patient. Please contact us if you have any additional questions.    Bairon Tam   Gastroenterology Fellow PGY VI  [phone]

## 2018-01-16 NOTE — ASSESSMENT & PLAN NOTE
- hold valcyte as WBC low.  CMV PCR 1/11 undetected.  - Continue Bactrim for PCP proph  - isavu for fungal prophylaxis 2/2 complicated post-op course

## 2018-01-16 NOTE — PT/OT/SLP EVAL
"Physical Therapy Evaluation and Discharge Note    Patient Name:  Jhonny Diana   MRN:  [id number]    Recommendations:     Discharge Recommendations:  home   Discharge Equipment Recommendations: none   Barriers to discharge: None    Assessment:     Jhonny Diana is a 27 y.o. male admitted with a medical diagnosis of Fever.  Pt is s/p liver transplant 10/19/17.  Pt currently presents as independent with all functional transfers and gait.  At this time, patient is functioning at their prior level of function and does not require further acute PT services.     Recent Surgery: Procedure(s) (LRB):  [id number] (N/A)      Plan:     During this hospitalization, patient does not require further acute PT services.  Please re-consult if situation changes.     Plan of Care Reviewed with: patient, mother    Subjective     Communicated with nursing prior to session.  Patient found in supine upon PT entry to room, agreeable to evaluation.      "Bleh"    Chief Complaint: Abdominal pain  Patient comments/goals: To stop going to the hospital  Pain/Comfort:  · Pain Rating 1: 6/10  · Location 1: abdomen  · Pain Addressed 1: Nurse notified, Pre-medicate for activity, Distraction    Patients cultural, spiritual, Restoration conflicts given the current situation: no    Living Environment:  Pt is currently residing in the [facility], [address].  Mother is able to provide transportation.    Prior to admission, patients level of function was independent with functional mobility, modified I with outdoor amb.  Patient has the following equipment: RW use for outdoor amb, BSC over the toilet.  DME owned (not currently used): none.  Upon discharge, patient will have assistance from mother.    Objective:     Patient found with: peripheral IV, telemetry (HD catheter, transpyloric feeding tube)     General Precautions: Standard, fall   Orthopedic Precautions:N/A   Braces: N/A     Exams:    · Cognitive Exam:  Patient is oriented to Person, " Place, Time and Situation and follows 100% of multi step commands   · Fine Motor Coordination:    · -       Intact  Left hand thumb/finger opposition skills, Right hand thumb/finger opposition skills, RLE heel shin and LLE heel shin  · Gross Motor Coordination:  WFL  · Postural Exam:  Patient presented with the following abnormalities:    · -       No postural abnormalities identified  · Sensation:    · -       Intact  light/touch B LEs  · Skin Integrity/Edema:      · -       Skin integrity: Visible skin intact  · -       Edema: Pitting B LEs    · RUE ROM: WFL  · RUE Strength: WFL  · LUE ROM: WFL  · LUE Strength: WFL    · RLE ROM: WFL  · RLE Strength: WFL  · LLE ROM: WFL  · LLE Strength: WFL    Functional Mobility:    · Bed Mobility:     · Scooting: independence  · Supine to Sit: independence  · Sit to Supine: independence    · Transfers:     · Sit to Stand:  independence with no AD  · Bed to Chair: independence with  no AD  using  Stand Pivot  · Toilet Transfer: independence with  no AD  using  Stand Pivot    · Gait: Pt amb 450', independently, able to maneuver IV pole without A, no SOB, no LOB    · Balance: Independent with dynamic standing balance without AD    AM-PAC 6 CLICK MOBILITY  Total Score:24     Therapeutic Activities and Exercises:   Whiteboard updated  6MWT: Pt amb 368' = 112.17m  Gait speed: 0.31 m/s    Patient left supine with all lines intact, call button in reach and nursing notified and mother present.    GOALS:    Physical Therapy Goals     Not on file                History:     Past Medical History:   Diagnosis Date    Alcoholic hepatitis with ascites     Alcoholic hepatitis with ascites     History of hematemesis 9/28/2017    Hypertension     Renal disorder        Past Surgical History:   Procedure Laterality Date    APPENDECTOMY         Clinical Decision Making:     History  Co-morbidities and personal factors that may impact the plan of care Examination  Body Structures and Functions,  activity limitations and participation restrictions that may impact the plan of care Clinical Presentation   Decision Making/ Complexity Score   Co-morbidities:   [] Time since onset of injury / illness / exacerbation  [] Status of current condition  []Patient's cognitive status and safety concerns    [] Multiple Medical Problems (see med hx)  Personal Factors:   [] Patient's age  [] Prior Level of function   [] Patient's home situation (environment and family support)  [] Patient's level of motivation  [] Expected progression of patient      HISTORY:(criteria)    [] 13340 - no personal factors/history    [x] 97506 - has 1-2 personal factor/comorbidity     [] 81376 - has >3 personal factor/comorbidity     Body Regions:  [] Objective examination findings  [] Head     []  Neck  [x] Trunk   [] Upper Extremity  [] Lower Extremity    Body Systems:  [] For communication ability, affect, cognition, language, and learning style: the assessment of the ability to make needs known, consciousness, orientation (person, place, and time), expected emotional /behavioral responses, and learning preferences (eg, learning barriers, education  needs)  [x] For the neuromuscular system: a general assessment of gross coordinated movement (eg, balance, gait, locomotion, transfers, and transitions) and motor function  (motor control and motor learning)  [] For the musculoskeletal system: the assessment of gross symmetry, gross range of motion, gross strength, height, and weight  [] For the integumentary system: the assessment of pliability(texture), presence of scar formation, skin color, and skin integrity  [x] For cardiovascular/pulmonary system: the assessment of heart rate, respiratory rate, blood pressure, and edema     Activity limitations:    [] Patient's cognitive status and saf ety concerns          [] Status of current condition      [] Weight bearing restriction  [] Cardiopulmunary Restriction    Participation Restrictions:   []  Goals and goal agreement with the patient     [] Rehab potential (prognosis) and probable outcome      Examination of Body System: (criteria)    [x] 55035 - addressing 1-2 elements    [] 48244 - addressing a total of 3 or more elements     [] 65423 -  Addressing a total of 4 or more elements         Clinical Presentation: (criteria)  Stable - 23081     On examination of body system using standardized tests and measures patient presents with 1-2 elements from any of the following: body structures and functions, activity limitations, and/or participation restrictions.  Leading to a clinical presentation that is considered stable and/or uncomplicated                              Clinical Decision Making  (Eval Complexity):  Low- 79590     Time Tracking:     PT Received On: 01/16/18  PT Start Time: 1227     PT Stop Time: 1255  PT Total Time (min): 28 min     Billable Minutes: Evaluation 15 and Gait Training 13      Tory Amato, PT  01/16/2018

## 2018-01-16 NOTE — PLAN OF CARE
Problem: Nutrition, Imbalanced: Inadequate Oral Intake (Adult)  Intervention: Promote/Optimize Nutrition    Recommendation/Intervention: Continue TPN until enteral nutrition is feasible. Minimize time spent without adequate nutrition support.      TF Recommendations: Novasource renal @ goal rate of 45mL/hr to provide 2160kcal, 98g protein, and 774mL fluid. Continue to advance to new goal rate by increasing by 10mL/hr q 2hrs. Additional water flushes per MD. Hold for residuals >500mL.      Current TPN provision inadequate to meet EEN and EPN. Recommend 90g AA / 379g dextrose to better meet patients needs.     Pt has been asking for solid food - recommend advancing diet but keeping TF running at goal rate of 45mL/hr until oral intake meets >75% of meals consistently.      Goals: Consume/tolerate > 85% EEN and EPN  Nutrition Goal Status: progressing towards goal  Communication of RD Recs: reviewed with physician

## 2018-01-16 NOTE — ASSESSMENT & PLAN NOTE
May be 2/2 biliary stent occlusion vs intrahepatic process (LFTs never normalized after last ERCP) vs extrahepatic causes of elevated LFTs (ongoing wound infection, SBP - last tap 1/11 negative).    Will proceed with EUS+liver biopsy + ERCP tomorrow  Keep NPO/Stop tube feeds at midnight  Hold any prophylactic anticoagulation.

## 2018-01-16 NOTE — PROGRESS NOTES
[facility]  Liver Transplant  Progress Note    Patient Name: Jhonny Diana  MRN: [id number]  Admission Date: 2018  Hospital Length of Stay: 5 days  Code Status: Full Code  Primary Care Provider: Primary Doctor No  Post-Operative Day: 89    ORGAN:   LIVER  Disease Etiology: Acute Alcoholic Hepatitis  Donor Type:    - Brain Death  CDC High Risk:   No  Donor CMV Status:   Donor CMV Status: Positive  Donor HBcAB:   Negative  Donor HCV Status:   Negative  Whole or Partial: Whole Liver  Biliary Anastomosis: End to End  Arterial Anatomy: Standard  Subjective:     History of Present Illness:  Jhonny Diana is a 28 y/o male with past medical history of alcoholic cirrhosis.  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 Found to have peritonitis in November (WBC 5000, 75% PNM) neg for bile leak. He was treated initially with vanc/cefepime. Repeat cell counts  with some improvement (WBC 1400, 45% PNM). He has undergone multiple paracenteses as well as abscess drainage of perihepatic fluid collections and treated with antimicrobial therapy but no positive cultures. Of note, biliary stricture also identified and ERCP performed on 2017 with sphincterotomy and biliary stent placed. Liver tests still have not normalized despite intervention, bilirubin and AP remain elevated. Other pertinent PMH current wound vac in place 2/2 wound infection, malnutrition requiring TPN for short course and ongoing hypoalbuminemia, and seizure activity while on prograf and has since been switched to cyclosporine without reoccurrence.  He presented to the ER for fever, abdominal pain, and N/V. He reports fever (103) for 1 day  prior. Overnight, he developed N/V, reports small amount of green emesis with new left sided pain. He also endorses worsening SOB with exertion. He was scheduled as an outpatient for follow up paracentesis and IR drainage of fluid collection. CXR in ER shows large pleural effusion with subsegmental atelectasis. Infectious work up initiated in ER. His ANC is 900. Broad spectrum antibiotics initiated in ED. He denies chest pain, palpitations, diarrhea, constipation, or back pain. Denies any sick contacts.    Hospital Course:  Thoracentesis (1.2L off), Paracentesis (1.6L off), and IR drainage of fluid collection 1/11, all fluids negative for infection. ID consulted. Broad spectrum antibiotics d/c'd 1/15. Chronically malnourished with poor PO intake, prealbumin 7. [place] tube placed 1/15 for tube feedings.     Interval History: no acute events overnight. SHANE tube replaced this AM with TF as tolerated, intermittently nausea persists. TBili elevated today, ERCP tomorrow with EUS for liver biopsy. Plan to place central line for TPN starting tonight. Bowel regimen started, small BM yesterday with enema. Albumin 25% x 1 today. Plan to repeat CT scan in next few days to look for other intra-abdominal causes of fever/nausea/abdominal pain.     Scheduled Meds:   albumin human 25%  25 g Intravenous Once    ampicillin-sulbactam (UNASYN) IVPB 3 g  1.5 g Intravenous Q8H    atovaquone  1,500 mg Oral Daily    bisacodyl  10 mg Oral QHS    cycloSPORINE modified (NEORAL)  75 mg Oral BID    docusate sodium  100 mg Oral TID    epoetin maurisio (PROCRIT) injection  10,000 Units Intravenous Every Mon, Wed, Fri    ergocalciferol  50,000 Units Oral Q7 Days    fat emulsion 20%  250 mL Intravenous Daily    heparin (porcine)  5,000 Units Subcutaneous Q12H    levETIRAcetam  500 mg Oral BID    levothyroxine  75 mcg Oral Before breakfast    mirtazapine  7.5 mg Oral QHS    multivitamin  1 tablet Oral Daily    pantoprazole  40 mg Oral Daily     polyethylene glycol  17 g Oral Daily    thiamine  100 mg Oral Daily    ursodiol  300 mg Oral BID     Continuous Infusions:   TPN ADULT CENTRAL LINE CUSTOM       PRN Meds:sodium chloride 0.9%, acetaminophen, heparin (porcine), ondansetron, ondansetron, oxyCODONE, oxyCODONE, prochlorperazine, sodium chloride 0.9%    Review of Systems   Constitutional: Positive for activity change and appetite change. Negative for chills, fatigue and fever.   HENT: Negative.    Respiratory: Negative for cough, shortness of breath and wheezing.    Cardiovascular: Positive for leg swelling. Negative for chest pain.   Gastrointestinal: Positive for abdominal distention and abdominal pain. Negative for constipation, nausea and vomiting.   Genitourinary: Negative for difficulty urinating and dysuria.        Anuric   Musculoskeletal: Negative for arthralgias, back pain and myalgias.   Skin: Positive for wound. Negative for color change and rash.   Allergic/Immunologic: Positive for immunocompromised state.   Neurological: Positive for weakness. Negative for dizziness, seizures and headaches.   Psychiatric/Behavioral: Negative for confusion and decreased concentration. The patient is not nervous/anxious.    All other systems reviewed and are negative.    Objective:     Vital Signs (Most Recent):  Temp: 98.2 °F (36.8 °C) (01/16/18 1131)  Pulse: 96 (01/16/18 1131)  Resp: 16 (01/16/18 1131)  BP: 117/67 (01/16/18 1131)  SpO2: 97 % (01/16/18 1131) Vital Signs (24h Range):  Temp:  [98.2 °F (36.8 °C)-99.5 °F (37.5 °C)] 98.2 °F (36.8 °C)  Pulse:  [] 96  Resp:  [16-20] 16  SpO2:  [96 %-98 %] 97 %  BP: (104-117)/(56-68) 117/67     Weight: 57.5 kg (126 lb 12.2 oz)  Body mass index is 20.46 kg/m².    Intake/Output - Last 3 Shifts       01/14 0700 - 01/15 0659 01/15 0700 - 01/16 0659 01/16 0700 - 01/17 0659    P.O. 460 360 430    I.V. (mL/kg) 0 (0) 200 (3.5)     Other 0 600     NG/GT  20 95    IV Piggyback 150  100    Total Intake(mL/kg) 610  (9.8) 1180 (20.5) 625 (10.9)    Urine (mL/kg/hr) 150 (0.1) 100 (0.1) 70 (0.2)    Emesis/NG output 0 (0) 0 (0)     Other 0 (0) 1600 (1.2)     Stool 0 (0) 0 (0) 0 (0)    Blood 0 (0) 0 (0)     Total Output 150 1700 70    Net +460 -520 +555           Urine Occurrence 0 x 0 x     Stool Occurrence 1 x 1 x 0 x    Emesis Occurrence 0 x 0 x           Physical Exam   Constitutional: He is oriented to person, place, and time. He appears well-developed. No distress.   Temporal and distal extremity muscle wasting   HENT:   Head: Normocephalic and atraumatic.   Mouth/Throat: No oropharyngeal exudate.   Eyes: EOM are normal. Pupils are equal, round, and reactive to light. Scleral icterus is present.   Neck: Normal range of motion. Neck supple. No JVD present. No thyromegaly present.   Cardiovascular: Normal rate, regular rhythm, normal heart sounds and intact distal pulses.    No murmur heard.  Pulmonary/Chest: Effort normal. No respiratory distress. He has decreased breath sounds in the right middle field, the right lower field and the left lower field. He has no wheezes. He exhibits no tenderness.   Diminished to RLL   Abdominal: Soft. Bowel sounds are normal. He exhibits distension and ascites. There is tenderness (mild). There is no rebound and no guarding.   Dressing to chevron.  Wd vac removed 1/12/18   Musculoskeletal: Normal range of motion. He exhibits edema (2+ LE edema). He exhibits no tenderness.   Neurological: He is alert and oriented to person, place, and time. He has normal reflexes.   Skin: Skin is warm and dry. Capillary refill takes 2 to 3 seconds. He is not diaphoretic. No erythema.   jaundice   Psychiatric: He has a normal mood and affect. His behavior is normal. Judgment and thought content normal. His mood appears not anxious.   Nursing note and vitals reviewed.      Laboratory:  Immunosuppressants         Stop Route Frequency     cycloSPORINE modified (NEORAL) capsule 75 mg      -- Oral 2 times daily         CBC:   Recent Labs  Lab 01/16/18  0601   WBC 2.65*   RBC 2.52*   HGB 7.5*   HCT 23.4*      MCV 93   MCH 29.8   MCHC 32.1     CMP:   Recent Labs  Lab 01/16/18  0601   GLU 60*   CALCIUM 8.8   ALBUMIN 2.4*   PROT 4.7*   *   K 3.8   CO2 22*      BUN 11   CREATININE 2.9*   ALKPHOS 97   ALT <5*   AST 16   BILITOT 2.5*     Coagulation:   Recent Labs  Lab 01/11/18  0434   INR 1.2     Labs within the past 24 hours have been reviewed.    Diagnostic Results:  I have personally reviewed all pertinent imaging studies.    Assessment/Plan:     * Fever    - unclear source of fever, previously thought to be related to peritonitis  - blood cultures prelim NGTD. No urine cx obtained as pt anuric.   - Pt underwent thoracentesis, paracentesis, and drain placement into fluid collection 1/11/18.   - All fluids reviewed and negative for infection. Cultures NGTD.  - ID following. Cefepime d/c 1/15. Cont to monitor.         Nausea and vomiting    - h/o constipation, reports having regular BM's with bowel regimen  - vomiting episode x 1 at home. Anti-emetics ordered PRN.  - KUB repeated 1/14/18 given increased abdominal pain- mild, generalized bowel distention suggesting ileus.  Diet changed to clears as tolerated for bowel rest   - SHANE tube placed 1/16 for tube feeds.   - chronic constipation, small BM yesterday after soaps suds enema, started bowel regimen today  - plan for repeat CT abdomen in next few days to see if other intra-abdominal causes of pain/nausea        Peritonitis    - initially diagnosed in November but has since had improved cell counts in recent para's  - IR placed drain in fluid collection and paracentesis performed 1/11/18. Both fluids reviewed and negative for infection based on cell counts.   - F/u on cultures.         Pleural effusion, right    - CXR in ER with large right pleural effusion with subjective c/o worsening SOB  - thoracentesis completed- 1200 ml removed.  SOB with significant  improvement.   - Cell count reviewed and negative for infection.   - resp even and non labored.  O2 sat 94-98%.        Liver transplanted    - post op course complicated by fevers and hyperbilirubinemia  - ERCP 12/6 with post-anastomosis stricture with stent placement  - AST/ALT normal. Tbili/alkphos remain elevated  - Liver US 1/8 showed 3.9cm complex fluid collection anterior to right lobe and moderate nonspecific complex ascites inferior to transplant.   - IR placed drain 1/11/18, cell count negative for infection.  - PBS consulted, ERCP/EUS with biopsy tomorrow for stent exchange (perioperative antibiotics ordered)        Neutropenia    - Infectious work up negative so far, dc antibiotics 1/15.  - CMV PCR from 1/11/18 not detected. Monitor.  -  today, neupogen given.         Other ascites    - Paracentesis performed 1/11/18 with 1600 ml removed.    - fluid negative for infection.         Severe protein-calorie malnutrition    - consult dietician.  - SHANE and tube feeds ordered        Protein-calorie malnutrition    - Poor PO intake since transplant requiring short course of TPN during previous hospital stay.   - albumin remains decreased but appetite slowly improving per pt.   - Dietary consulted. Will need to closely monitor PO intake.   - supplements also ordered.   - SHANE with tube feeding start 1/16.  - prealbumin 7 on 1/15.   - Plan to place central line today and start TPN tonight due to being NPO over next few days, plan to continue TF.         Anemia of chronic disease    - responded appropriately to 1 u PRBC given 1/12.  H/H stable.   - Trend H&H daily.         Delayed surgical wound healing    - wd vac removed 1/12/18. Wound care following, recommend dc wound vac. Wound healing well. Aquacel AG and Mepilex border dressing applied via wound care recs.           At risk for opportunistic infections    - hold valcyte as WBC low.  CMV PCR 1/11 undetected.  - Continue Bactrim for PCP proph  - isavu for  fungal prophylaxis 2/2 complicated post-op course        Long-term use of immunosuppressant medication    - Maintenance IS with cyclosporine. MMF on hold for infections and neutropenia. cont to check cyclo level daily. Assess for toxicity and adjust level as needed        Prophylactic immunotherapy    - See long term use of immunosuppression.         Acute renal failure with tubular necrosis    - HD resumed on previous admission. Now anuric and dialyzes M-W-F.  - Nephrology following.              VTE Risk Mitigation         Ordered     heparin (porcine) injection 1,000 Units  As needed (PRN)     Route:  Intra-Catheter        01/12/18 1017     heparin (porcine) injection 5,000 Units  Every 12 hours     Route:  Subcutaneous        01/12/18 0926     Medium Risk of VTE  Once      01/11/18 0351     Place sequential compression device  Until discontinued      01/11/18 0351          The patients clinical status was discussed at multidisplinary rounds, involving transplant surgery, transplant medicine, pharmacy, nursing, nutrition, and social work    Discharge Planning: not stable for discharge at this time.  Monitor kidney function --> may need outpt HD  Monitor HH needs vs rehab for deconditioned status      Shereen Mcguire DNP  Liver Transplant  [facility]

## 2018-01-16 NOTE — ASSESSMENT & PLAN NOTE
- h/o constipation, reports having regular BM's with bowel regimen  - vomiting episode x 1 at home. Anti-emetics ordered PRN.  - KUB repeated 1/14/18 given increased abdominal pain- mild, generalized bowel distention suggesting ileus.  Diet changed to clears as tolerated for bowel rest   - SHANE tube placed 1/16 for tube feeds.   - chronic constipation, small BM yesterday after soaps suds enema, started bowel regimen today  - plan for repeat CT abdomen in next few days to see if other intra-abdominal causes of pain/nausea

## 2018-01-16 NOTE — ASSESSMENT & PLAN NOTE
- post op course complicated by fevers and hyperbilirubinemia  - ERCP 12/6 with post-anastomosis stricture with stent placement  - AST/ALT normal. Tbili/alkphos remain elevated  - Liver US 1/8 showed 3.9cm complex fluid collection anterior to right lobe and moderate nonspecific complex ascites inferior to transplant.   - IR placed drain 1/11/18, cell count negative for infection.  - PBS consulted, ERCP/EUS with biopsy tomorrow for stent exchange (perioperative antibiotics ordered)

## 2018-01-16 NOTE — HPI
"27-year-old male with past medical history of ESLD 2/2 ETOH s/p OLT on 10/19/2017 with a complicated course.   GI and AES were consulted previously during this admission for evaluation of nausea and decreased appetite, started patient on miralax and protonix.     Patient's postoperative course has been complicated by seizures (swtiched off prograf to cyclo), ATN requiring HD (M-W-F, last 1/10, anuric), superficial wound infection s/p wound vac to chevron incision, and multiple admissions for fevers on 11/21 (discharged on empiric augmentin for suspected superficial wound infection), readmitted 11/24 again with fever, and 12/3. Found to have peritonitis in November (WBC 5000, 75% PNM) negative for bile leak and was treated initially with vanc/cefepime.  He has undergone multiple paracenteses as well as abscess drainage of perihepatic fluid collections and treated with antimicrobial therapy but no positive cultures. The patient underwent an ERCP on 12/6/2017 which showed a 1mm biliary anastomotic stricture - one [id number] plastic stent placed; however, bilirubin and AP remained elevated. ERCP also completed 1/17/18 with almost complete resolution of the anastomotic stricture.  Choledocholithiasis was found and complete removal was accomplished by balloon extraction. One plastic stent, 10Fr x 9cm was placed across the anastomosis as requested by the transplant service.   Most recently, patient had EGD on 01/25, which did not show any concerning findings, ERCP on 01/30 with stent removed from the bilary tree and findings of choledocholithiasis with complete removal during the procedure and placement of two plastic stents in the common bile duct, small bowel follow-through 02/11 with normal gastric emptying, and diagnostic laparoscopy 02/07 with findings of "Turbid dark appearing fluid in pelvis, loculated area around kidney entered without significant return of fluid - sent for studies".     Patient has been " intermittently on tube feeds via his NG tube, but he overall has not well-tolerated the feeds; therefore, GI has been consulted for placement of a G-J tube for long-term nutrition.

## 2018-01-16 NOTE — ASSESSMENT & PLAN NOTE
- unclear source of fever, previously thought to be related to peritonitis  - blood cultures prelim NGTD. No urine cx obtained as pt anuric.   - Pt underwent thoracentesis, paracentesis, and drain placement into fluid collection 1/11/18.   - All fluids reviewed and negative for infection. Cultures NGTD.  - ID following. Cefepime d/c 1/15. Cont to monitor.

## 2018-01-16 NOTE — PROGRESS NOTES
Dr Ma at the bedside to place a central line. Time-out preformed before starting the procedure. Frequent VS in progress. Patient was pre-medicated with 0.5mg Ativan IVP before starting.

## 2018-01-16 NOTE — ASSESSMENT & PLAN NOTE
Nutrition Diagnosis  Inadequate energy intake    Related to (etiology):   Decreased appetite    Signs and Symptoms (as evidenced by):   Pt meeting ~25% of needs at most, hollowing temples, protruding clavicle, visible scapula     Interventions/Recommendations (treatment strategy):  See recs    Nutrition Diagnosis Status:   Continues

## 2018-01-16 NOTE — ASSESSMENT & PLAN NOTE
- Poor PO intake since transplant requiring short course of TPN during previous hospital stay.   - albumin remains decreased but appetite slowly improving per pt.   - Dietary consulted. Will need to closely monitor PO intake.   - supplements also ordered.   - SHANE with tube feeding start 1/16.  - prealbumin 7 on 1/15.   - Plan to place central line today and start TPN tonight due to being NPO over next few days, plan to continue TF.

## 2018-01-16 NOTE — SUBJECTIVE & OBJECTIVE
Scheduled Meds:   albumin human 25%  25 g Intravenous Once    ampicillin-sulbactam (UNASYN) IVPB 3 g  1.5 g Intravenous Q8H    atovaquone  1,500 mg Oral Daily    bisacodyl  10 mg Oral QHS    cycloSPORINE modified (NEORAL)  75 mg Oral BID    docusate sodium  100 mg Oral TID    epoetin maurisio (PROCRIT) injection  10,000 Units Intravenous Every Mon, Wed, Fri    ergocalciferol  50,000 Units Oral Q7 Days    fat emulsion 20%  250 mL Intravenous Daily    heparin (porcine)  5,000 Units Subcutaneous Q12H    levETIRAcetam  500 mg Oral BID    levothyroxine  75 mcg Oral Before breakfast    mirtazapine  7.5 mg Oral QHS    multivitamin  1 tablet Oral Daily    pantoprazole  40 mg Oral Daily    polyethylene glycol  17 g Oral Daily    thiamine  100 mg Oral Daily    ursodiol  300 mg Oral BID     Continuous Infusions:   TPN ADULT CENTRAL LINE CUSTOM       PRN Meds:sodium chloride 0.9%, acetaminophen, heparin (porcine), ondansetron, ondansetron, oxyCODONE, oxyCODONE, prochlorperazine, sodium chloride 0.9%    Review of Systems   Constitutional: Positive for activity change and appetite change. Negative for chills, fatigue and fever.   HENT: Negative.    Respiratory: Negative for cough, shortness of breath and wheezing.    Cardiovascular: Positive for leg swelling. Negative for chest pain.   Gastrointestinal: Positive for abdominal distention and abdominal pain. Negative for constipation, nausea and vomiting.   Genitourinary: Negative for difficulty urinating and dysuria.        Anuric   Musculoskeletal: Negative for arthralgias, back pain and myalgias.   Skin: Positive for wound. Negative for color change and rash.   Allergic/Immunologic: Positive for immunocompromised state.   Neurological: Positive for weakness. Negative for dizziness, seizures and headaches.   Psychiatric/Behavioral: Negative for confusion and decreased concentration. The patient is not nervous/anxious.    All other systems reviewed and are  negative.    Objective:     Vital Signs (Most Recent):  Temp: 98.2 °F (36.8 °C) (01/16/18 1131)  Pulse: 96 (01/16/18 1131)  Resp: 16 (01/16/18 1131)  BP: 117/67 (01/16/18 1131)  SpO2: 97 % (01/16/18 1131) Vital Signs (24h Range):  Temp:  [98.2 °F (36.8 °C)-99.5 °F (37.5 °C)] 98.2 °F (36.8 °C)  Pulse:  [] 96  Resp:  [16-20] 16  SpO2:  [96 %-98 %] 97 %  BP: (104-117)/(56-68) 117/67     Weight: 57.5 kg (126 lb 12.2 oz)  Body mass index is 20.46 kg/m².    Intake/Output - Last 3 Shifts       01/14 0700 - 01/15 0659 01/15 0700 - 01/16 0659 01/16 0700 - 01/17 0659    P.O. 460 360 430    I.V. (mL/kg) 0 (0) 200 (3.5)     Other 0 600     NG/GT  20 95    IV Piggyback 150  100    Total Intake(mL/kg) 610 (9.8) 1180 (20.5) 625 (10.9)    Urine (mL/kg/hr) 150 (0.1) 100 (0.1) 70 (0.2)    Emesis/NG output 0 (0) 0 (0)     Other 0 (0) 1600 (1.2)     Stool 0 (0) 0 (0) 0 (0)    Blood 0 (0) 0 (0)     Total Output 150 1700 70    Net +460 -520 +555           Urine Occurrence 0 x 0 x     Stool Occurrence 1 x 1 x 0 x    Emesis Occurrence 0 x 0 x           Physical Exam   Constitutional: He is oriented to person, place, and time. He appears well-developed. No distress.   Temporal and distal extremity muscle wasting   HENT:   Head: Normocephalic and atraumatic.   Mouth/Throat: No oropharyngeal exudate.   Eyes: EOM are normal. Pupils are equal, round, and reactive to light. Scleral icterus is present.   Neck: Normal range of motion. Neck supple. No JVD present. No thyromegaly present.   Cardiovascular: Normal rate, regular rhythm, normal heart sounds and intact distal pulses.    No murmur heard.  Pulmonary/Chest: Effort normal. No respiratory distress. He has decreased breath sounds in the right middle field, the right lower field and the left lower field. He has no wheezes. He exhibits no tenderness.   Diminished to RLL   Abdominal: Soft. Bowel sounds are normal. He exhibits distension and ascites. There is tenderness (mild). There is no  rebound and no guarding.   Dressing to chevron.  Wd vac removed 1/12/18   Musculoskeletal: Normal range of motion. He exhibits edema (2+ LE edema). He exhibits no tenderness.   Neurological: He is alert and oriented to person, place, and time. He has normal reflexes.   Skin: Skin is warm and dry. Capillary refill takes 2 to 3 seconds. He is not diaphoretic. No erythema.   jaundice   Psychiatric: He has a normal mood and affect. His behavior is normal. Judgment and thought content normal. His mood appears not anxious.   Nursing note and vitals reviewed.      Laboratory:  Immunosuppressants         Stop Route Frequency     cycloSPORINE modified (NEORAL) capsule 75 mg      -- Oral 2 times daily        CBC:   Recent Labs  Lab 01/16/18  0601   WBC 2.65*   RBC 2.52*   HGB 7.5*   HCT 23.4*      MCV 93   MCH 29.8   MCHC 32.1     CMP:   Recent Labs  Lab 01/16/18  0601   GLU 60*   CALCIUM 8.8   ALBUMIN 2.4*   PROT 4.7*   *   K 3.8   CO2 22*      BUN 11   CREATININE 2.9*   ALKPHOS 97   ALT <5*   AST 16   BILITOT 2.5*     Coagulation:   Recent Labs  Lab 01/11/18  0434   INR 1.2     Labs within the past 24 hours have been reviewed.    Diagnostic Results:  I have personally reviewed all pertinent imaging studies.

## 2018-01-16 NOTE — ASSESSMENT & PLAN NOTE
- wd vac removed 1/12/18. Wound care following, recommend dc wound vac. Wound healing well. Aquacel AG and Mepilex border dressing applied via wound care recs.

## 2018-01-16 NOTE — ANESTHESIA PREPROCEDURE EVALUATION
01/16/2018  [facility]  Anesthesia Pre-Operative Evaluation         Patient Name: Jhonny Diana  YOB: 1990  MRN: [id number]    SUBJECTIVE:     Pre-operative evaluation for Procedure(s) (LRB):  ULTRASOUND-ENDOSCOPIC-UPPER (N/A)  ERCP (N/A)     01/16/2018    Jhonny Diana is a 27 y.o. male w/ a significant PMHx of alcoholic cirrhosis s/p DDLT 10/19/2017; c/b seizures, LAURA on CKD (on HD) , superificial wound infection and peritonitis who presented to the ED with fevers, N/V. Pt had ERCP on 12/06/17 which showed 1mm biliary anastomotic stricture and one [id number] stent placed.     Pt's mother mentioned that after previous anesthesia that his eyes looked like he had been punched. Most likely 2/2 to Tegaderm. Would recommend using paper tape.     Patient now presents for the above procedure(s).      LDA:        Hemodialysis Catheter 10/30/17 1140 right internal jugular (Active)   Site Assessment Clean;Dry;Intact;No redness;No swelling 1/16/2018  8:00 AM   Status Clamped 1/16/2018  8:00 AM   Flows Good 1/15/2018 10:15 AM   Dressing Intervention Dressing reinforced 1/15/2018  1:30 PM   Dressing Status Biopatch in place;Clean;Dry;Intact 1/16/2018  8:00 AM   Dressing Change Due 01/19/18 1/16/2018  8:00 AM   Site Condition No complications 1/16/2018  8:00 AM   Dressing Occlusive 1/14/2018  8:27 PM   Daily Line Review Performed 1/16/2018  8:00 AM   Arterial Volume (mL) 1.8 mL 1/15/2018  1:30 PM   Venous Volume (mL) 1.8 mL 1/15/2018  1:30 PM   Number of days: 78            Peripheral IV - Single Lumen 01/15/18 2000 Right Forearm (Active)   Site Assessment Clean;Intact;Dry;No redness;No swelling 1/16/2018  8:00 AM   Line Status Flushed 1/16/2018  8:00 AM   Dressing Change Due 01/19/18 1/16/2018  8:00 AM   Site Change Due 01/19/18 1/16/2018  8:00 AM   Reason Not Rotated Not due 1/16/2018  8:00 AM    Number of days: 0            Trans Pyloric Feeding Tube 01/16/18 0529 Right nostril (Active)   Placement Check placement verified by x-ray 1/16/2018  8:00 AM   Tube advanced (cm) 65 1/16/2018  8:00 AM   Tolerance no signs/symptoms of discomfort 1/16/2018  8:00 AM   Securement anchored to nostril center w/ adhesive device 1/16/2018  8:00 AM   Clamp Status/Tolerance unclamped 1/16/2018  8:00 AM   Feeding Method continuous 1/16/2018  8:00 AM   Current Rate (mL/hr) 30 mL/hr 1/16/2018  2:30 PM   Goal Rate (mL/hr) 30 mL/hr 1/16/2018  2:30 PM   Intake (mL) 30 mL 1/16/2018  2:30 PM   Intake (mL) - Formula Tube Feeding 30 1/16/2018  2:30 PM   Number of days: 0       Prev airway:   Placement Date: 12/06/17; Placement Time: 1105; Method of Intubation: Direct laryngoscopy; Inserted by: Anesthesia MD; Airway Device: Endotracheal Tube; Mask Ventilation: Easy; Intubated: Postinduction; Blade: Moore #2; Airway Device Size: 7.5; Style: Cuffed; Cuff Inflation: Minimal occlusive pressure; Inflation Amount: 6; Placement Verified By: Auscultation, Capnometry; Grade: Grade I; Complicating Factors: None; Intubation Findings: Positive EtCO2, Bilateral breath sounds, Atraumatic/Condition of teeth unchanged;  Depth of Insertion: 21    Drips:    TPN ADULT CENTRAL LINE CUSTOM         Patient Active Problem List   Diagnosis    Alcoholism /alcohol abuse    Acute renal failure with tubular necrosis    Leukocytosis    LAURA (acute kidney injury)    Liver transplanted    Prophylactic immunotherapy    Long-term use of immunosuppressant medication    At risk for opportunistic infections    Seizure    Delayed surgical wound healing    Anemia of chronic disease    Physical deconditioning    Peritonitis    Biliary stricture of transplanted liver    Severe malnutrition    Hypothyroidism    Hypervolemia    Protein-calorie malnutrition    Fever    Pleural effusion, right    N&V (nausea and vomiting)    Nausea and vomiting     Neutropenia    Severe protein-calorie malnutrition    Other ascites    Elevated liver function tests       Review of patient's allergies indicates:   Allergen Reactions    Bactrim [sulfamethoxazole-trimethoprim] Other (See Comments)     Mookie Trell Syndrome       Current Inpatient Medications:   ampicillin-sulbactam (UNASYN) IVPB 3 g  1.5 g Intravenous Q8H    atovaquone  1,500 mg Oral Daily    bisacodyl  10 mg Oral QHS    cycloSPORINE modified (NEORAL)  100 mg Oral BID    docusate sodium  100 mg Oral TID    epoetin maurisio (PROCRIT) injection  10,000 Units Intravenous Every Mon, Wed, Fri    ergocalciferol  50,000 Units Oral Q7 Days    fat emulsion 20%  250 mL Intravenous Daily    heparin (porcine)  5,000 Units Subcutaneous Q12H    levETIRAcetam  500 mg Oral BID    levothyroxine  75 mcg Oral Before breakfast    lorazepam  0.5 mg Intravenous Once    mirtazapine  7.5 mg Oral QHS    multivitamin  1 tablet Oral Daily    pantoprazole  40 mg Oral Daily    polyethylene glycol  17 g Oral Daily    thiamine  100 mg Oral Daily    ursodiol  300 mg Oral BID       No current facility-administered medications on file prior to encounter.      Current Outpatient Prescriptions on File Prior to Encounter   Medication Sig Dispense Refill    aspirin (ECOTRIN) 81 MG EC tablet Take 1 tablet (81 mg total) by mouth once daily. 30 tablet 5    atovaquone (MEPRON) 750 mg/5 mL Susp Take 10 mLs (1,500 mg total) by mouth once daily. Stop on 4/17/18 300 mL 5    cycloSPORINE modified, NEORAL, 25 MG capsule Take 3 capsules (75 mg total) by mouth 2 (two) times daily. 90 capsule 5    docusate sodium (COLACE) 100 MG capsule Take 100 mg by mouth 3 (three) times daily as needed for Constipation.      ergocalciferol (ERGOCALCIFEROL) 50,000 unit Cap Take 1 capsule (50,000 Units total) by mouth every 7 days. 4 capsule 5    levETIRAcetam (KEPPRA) 500 MG Tab Take 1 tablet (500 mg total) by mouth 2 (two) times daily. 60 tablet 11     levothyroxine (SYNTHROID) 75 MCG tablet Take 1 tablet (75 mcg total) by mouth before breakfast. 30 tablet 11    mirtazapine (REMERON) 7.5 MG Tab Take 1 tablet (7.5 mg total) by mouth every evening. 30 tablet 11    multivitamin (THERAGRAN) tablet Take 1 tablet by mouth once daily. 30 tablet 11    ondansetron (ZOFRAN-ODT) 8 MG TbDL Take 1 tablet (8 mg total) by mouth every 8 (eight) hours as needed (nausea). 30 tablet 1    oxyCODONE (ROXICODONE) 10 mg Tab immediate release tablet Take 0.5-1 tablets (5-10 mg total) by mouth every 6 (six) hours as needed for Pain. 30 tablet 0    pantoprazole (PROTONIX) 40 MG tablet Take 1 tablet (40 mg total) by mouth once daily. 30 tablet 5    sodium bicarbonate 650 MG tablet Take 2 tablets (1,300 mg total) by mouth 2 (two) times daily. 120 tablet 2    thiamine 100 MG tablet Take 1 tablet (100 mg total) by mouth once daily. 30 tablet 6    ursodiol (ACTIGALL) 300 mg capsule Take 1 capsule (300 mg total) by mouth 2 (two) times daily. 60 capsule 11       Past Surgical History:   Procedure Laterality Date    APPENDECTOMY         Social History     Social History    Marital status: Single     Spouse name: N/A    Number of children: N/A    Years of education: N/A     Occupational History    Not on file.     Social History Main Topics    Smoking status: Former Smoker     Types: Cigarettes    Smokeless tobacco: Never Used    Alcohol use No      Comment: a fifth of liquor daily for years, cut back over last 2 months    Drug use: No    Sexual activity: Not on file     Other Topics Concern    Not on file     Social History Narrative    No narrative on file       OBJECTIVE:     Vital Signs Range (Last 24H):  Temp:  [36.8 °C (98.2 °F)-37.5 °C (99.5 °F)]   Pulse:  [81-96]   Resp:  [16-20]   BP: (104-117)/(56-68)   SpO2:  [96 %-97 %]       CBC:   Recent Labs      01/15/18   0606  01/16/18   0601   WBC  3.00*  2.65*   RBC  2.61*  2.52*   HGB  8.0*  7.5*   HCT  24.3*  23.4*   PLT   194  199   MCV  93  93   MCH  30.7  29.8   MCHC  32.9  32.1       CMP:   Recent Labs      01/15/18   0606  01/16/18   0601   NA  133*  135*   K  3.4*  3.8   CL  101  105   CO2  22*  22*   BUN  21*  11   CREATININE  4.3*  2.9*   GLU  82  60*   MG  2.1  1.9   CALCIUM  8.8  8.8   ALBUMIN  1.8*  2.4*   PROT  4.3*  4.7*   ALKPHOS  106  97   ALT  <5*  <5*   AST  16  16   BILITOT  2.2*  2.5*       INR:  No results for input(s): PT, INR, PROTIME, APTT in the last 72 hours.    Diagnostic Studies: No relevant studies.    EKG: No recent studies available.    2D ECHO:  Results for orders placed or performed during the hospital encounter of 12/03/17   2D echo with color flow doppler   Result Value Ref Range    EF 65 55 - 65    Mitral Valve Regurgitation TRIVIAL     Diastolic Dysfunction No     Pericardial Effusion TRIVIAL     Tricuspid Valve Regurgitation TRIVIAL          ASSESSMENT/PLAN:       Anesthesia Evaluation    I have reviewed the Patient Summary Reports.    I have reviewed the Nursing Notes.   I have reviewed the Medications.     Review of Systems  Anesthesia Hx:  No problems with previous Anesthesia  History of prior surgery of interest to airway management or planning: liver transplant. Previous anesthesia: General Denies Family Hx of Anesthesia complications.   Denies Personal Hx of Anesthesia complications.   Cardiovascular:   Exercise tolerance: poor Hypertension    Renal/:   Chronic Renal Disease, ARF    Hepatic/GI:   Liver Disease, Hepatitis    Neurological:   Seizures, well controlled    Psych:   Psychiatric History          Physical Exam  General:  Well nourished    Airway/Jaw/Neck:  Airway Findings: Mouth Opening: Normal Tongue: Normal  General Airway Assessment: Adult  Mallampati: II  Improves to II with phonation.  TM Distance: Normal, at least 6 cm  Jaw/Neck Findings:  Neck ROM: Normal ROM      Dental:  Dental Findings: In tact   Chest/Lungs:  Chest/Lungs Findings: Clear to auscultation, Normal  Respiratory Rate     Heart/Vascular:  Heart Findings: Rate: Normal  Rhythm: Regular Rhythm  Sounds: Normal        Mental Status:  Mental Status Findings:  Cooperative, Alert and Oriented         Anesthesia Plan  Type of Anesthesia, risks & benefits discussed:  Anesthesia Type:  MAC, general  Patient's Preference:   Intra-op Monitoring Plan: standard ASA monitors  Intra-op Monitoring Plan Comments:   Post Op Pain Control Plan: multimodal analgesia, IV/PO Opioids PRN and per primary service following discharge from PACU  Post Op Pain Control Plan Comments:   Induction:   IV  Beta Blocker:  Patient is not currently on a Beta-Blocker (No further documentation required).       Informed Consent: Patient understands risks and agrees with Anesthesia plan.  Questions answered. Anesthesia consent signed with patient.  ASA Score: 4     Day of Surgery Review of History & Physical: I have interviewed and examined the patient. I have reviewed the patient's H&P dated:    H&P update referred to the provider.         Ready For Surgery From Anesthesia Perspective.

## 2018-01-16 NOTE — PROGRESS NOTES
Follow up on chevron wound  Already showing progress and new epithelization   Wound has  into two with new bridge of tissue at the middle.      01/16/18 1700       Incision/Site 10/30/17 0911 abdomen transverse   Date First Assessed/Time First Assessed: 10/30/17 0911   Present Prior to Hospital Arrival?: Yes  Location: abdomen  Orientation: transverse   Incision WDL ex   Dressing Appearance dry;intact   Periwound Area normal skin tone   Drainage Characteristics/Odor no odor  (hydrofiber was stuck to base )   Drainage Amount none   Wound Length (cm) 25  (wound has seperated into two wounds )   Wound Width (cm) 2   Depth (cm) 0.2  (left 1x13x0.1cm)   Wound Edges open   Cleansed W/ sterile normal saline   Irrigated W/ sterile normal saline   Dressing foam     Will discontinue use of the silver hydrofiber as it is too drying . Continue just the mepilex border dressing.   Zoë Murphy RN CWON  [id number]

## 2018-01-16 NOTE — PLAN OF CARE
Problem: Patient Care Overview  Goal: Plan of Care Review  Outcome: Ongoing (interventions implemented as appropriate)  Patient is AAOx3, independent. Patient c/o abdominal pain and nausea. PRN meds given per orders. PT is working with the patient. Patient has been laying in bed all day except to walk in the halls with PT. Encouraging the patient to get OOB to the chair and walk in the halls with his mother. Patient is receiving TF and has reached the goal of 30cc/hr. Patient will be NPO after MN and will start on TPN tonight. Patient received a dose of Neupogen this morning and was started on IV antibiotics. Patient to be seen by wound care today to change the dressing on his chevron incision. Patient continues to have a very poor appetite. Encouraging small ,frequent meals. Reminded the patient and his mother to call for assistance. Call light and personal items are within reach.

## 2018-01-17 ENCOUNTER — ANESTHESIA (OUTPATIENT)
Dept: ENDOSCOPY | Facility: HOSPITAL | Age: 28
DRG: 628 | End: 2018-01-17
Payer: COMMERCIAL

## 2018-01-17 PROBLEM — R79.89 ELEVATED LIVER FUNCTION TESTS: Status: ACTIVE | Noted: 2018-01-17

## 2018-01-17 LAB
ALBUMIN SERPL BCP-MCNC: 2.4 G/DL
ALP SERPL-CCNC: 108 U/L
ALT SERPL W/O P-5'-P-CCNC: <5 U/L
ANION GAP SERPL CALC-SCNC: 9 MMOL/L
AST SERPL-CCNC: 16 U/L
BASOPHILS # BLD AUTO: 0.03 K/UL
BASOPHILS NFR BLD: 0.6 %
BILIRUB SERPL-MCNC: 2 MG/DL
BUN SERPL-MCNC: 15 MG/DL
CALCIUM SERPL-MCNC: 9.6 MG/DL
CHLORIDE SERPL-SCNC: 103 MMOL/L
CO2 SERPL-SCNC: 23 MMOL/L
CREAT SERPL-MCNC: 3.8 MG/DL
CYCLOSPORINE BLD LC/MS/MS-MCNC: 334 NG/ML
DIFFERENTIAL METHOD: ABNORMAL
EOSINOPHIL # BLD AUTO: 0.2 K/UL
EOSINOPHIL NFR BLD: 3.6 %
ERYTHROCYTE [DISTWIDTH] IN BLOOD BY AUTOMATED COUNT: 16.3 %
EST. GFR  (AFRICAN AMERICAN): 23.5 ML/MIN/1.73 M^2
EST. GFR  (NON AFRICAN AMERICAN): 20.3 ML/MIN/1.73 M^2
GLUCOSE SERPL-MCNC: 114 MG/DL
HCT VFR BLD AUTO: 24 %
HGB BLD-MCNC: 8.1 G/DL
IMM GRANULOCYTES # BLD AUTO: 0.26 K/UL
IMM GRANULOCYTES NFR BLD AUTO: 4.9 %
LYMPHOCYTES # BLD AUTO: 1 K/UL
LYMPHOCYTES NFR BLD: 18 %
MAGNESIUM SERPL-MCNC: 2.3 MG/DL
MCH RBC QN AUTO: 31.9 PG
MCHC RBC AUTO-ENTMCNC: 33.8 G/DL
MCV RBC AUTO: 95 FL
MONOCYTES # BLD AUTO: 0.9 K/UL
MONOCYTES NFR BLD: 16.5 %
NEUTROPHILS # BLD AUTO: 3 K/UL
NEUTROPHILS NFR BLD: 56.4 %
NRBC BLD-RTO: 0 /100 WBC
PLATELET # BLD AUTO: 197 K/UL
PMV BLD AUTO: 10.1 FL
POCT GLUCOSE: 119 MG/DL (ref 70–110)
POTASSIUM SERPL-SCNC: 3.5 MMOL/L
PROT SERPL-MCNC: 5.2 G/DL
RBC # BLD AUTO: 2.54 M/UL
SODIUM SERPL-SCNC: 135 MMOL/L
WBC # BLD AUTO: 5.33 K/UL

## 2018-01-17 PROCEDURE — 63600175 PHARM REV CODE 636 W HCPCS: Performed by: SURGERY

## 2018-01-17 PROCEDURE — 37000008 HC ANESTHESIA 1ST 15 MINUTES: Performed by: INTERNAL MEDICINE

## 2018-01-17 PROCEDURE — 43276 ERCP STENT EXCHANGE W/DILATE: CPT | Performed by: INTERNAL MEDICINE

## 2018-01-17 PROCEDURE — D9220A PRA ANESTHESIA: Mod: ,,, | Performed by: ANESTHESIOLOGY

## 2018-01-17 PROCEDURE — 43276 ERCP STENT EXCHANGE W/DILATE: CPT | Mod: ,,, | Performed by: INTERNAL MEDICINE

## 2018-01-17 PROCEDURE — 90935 HEMODIALYSIS ONE EVALUATION: CPT | Mod: ,,, | Performed by: NURSE PRACTITIONER

## 2018-01-17 PROCEDURE — 80053 COMPREHEN METABOLIC PANEL: CPT

## 2018-01-17 PROCEDURE — 94761 N-INVAS EAR/PLS OXIMETRY MLT: CPT

## 2018-01-17 PROCEDURE — 27000221 HC OXYGEN, UP TO 24 HOURS

## 2018-01-17 PROCEDURE — 99233 SBSQ HOSP IP/OBS HIGH 50: CPT | Mod: 24,,, | Performed by: PHYSICIAN ASSISTANT

## 2018-01-17 PROCEDURE — 87040 BLOOD CULTURE FOR BACTERIA: CPT | Mod: 59

## 2018-01-17 PROCEDURE — 80158 DRUG ASSAY CYCLOSPORINE: CPT

## 2018-01-17 PROCEDURE — 63600175 PHARM REV CODE 636 W HCPCS: Performed by: PHYSICIAN ASSISTANT

## 2018-01-17 PROCEDURE — P9047 ALBUMIN (HUMAN), 25%, 50ML: HCPCS | Mod: JG | Performed by: PHYSICIAN ASSISTANT

## 2018-01-17 PROCEDURE — B4185 PARENTERAL SOL 10 GM LIPIDS: HCPCS | Performed by: PHYSICIAN ASSISTANT

## 2018-01-17 PROCEDURE — 90935 HEMODIALYSIS ONE EVALUATION: CPT | Mod: ,,, | Performed by: INTERNAL MEDICINE

## 2018-01-17 PROCEDURE — 63600175 PHARM REV CODE 636 W HCPCS: Performed by: NURSE PRACTITIONER

## 2018-01-17 PROCEDURE — 85025 COMPLETE CBC W/AUTO DIFF WBC: CPT

## 2018-01-17 PROCEDURE — A4217 STERILE WATER/SALINE, 500 ML: HCPCS | Performed by: PHYSICIAN ASSISTANT

## 2018-01-17 PROCEDURE — 25000003 PHARM REV CODE 250: Performed by: PHYSICIAN ASSISTANT

## 2018-01-17 PROCEDURE — 74328 X-RAY BILE DUCT ENDOSCOPY: CPT | Mod: 26,,, | Performed by: INTERNAL MEDICINE

## 2018-01-17 PROCEDURE — 25000003 PHARM REV CODE 250: Performed by: NURSE PRACTITIONER

## 2018-01-17 PROCEDURE — 20600001 HC STEP DOWN PRIVATE ROOM

## 2018-01-17 PROCEDURE — 27201089 HC SNARE, DISP (ANY): Performed by: INTERNAL MEDICINE

## 2018-01-17 PROCEDURE — 63600175 PHARM REV CODE 636 W HCPCS: Mod: JG | Performed by: PHYSICIAN ASSISTANT

## 2018-01-17 PROCEDURE — 43264 ERCP REMOVE DUCT CALCULI: CPT | Mod: 51,,, | Performed by: INTERNAL MEDICINE

## 2018-01-17 PROCEDURE — 63600175 PHARM REV CODE 636 W HCPCS: Performed by: HOSPITALIST

## 2018-01-17 PROCEDURE — C1769 GUIDE WIRE: HCPCS | Performed by: INTERNAL MEDICINE

## 2018-01-17 PROCEDURE — 43264 ERCP REMOVE DUCT CALCULI: CPT | Performed by: INTERNAL MEDICINE

## 2018-01-17 PROCEDURE — 37000009 HC ANESTHESIA EA ADD 15 MINS: Performed by: INTERNAL MEDICINE

## 2018-01-17 PROCEDURE — 63600175 PHARM REV CODE 636 W HCPCS: Performed by: NURSE ANESTHETIST, CERTIFIED REGISTERED

## 2018-01-17 PROCEDURE — 25000003 PHARM REV CODE 250: Performed by: NURSE ANESTHETIST, CERTIFIED REGISTERED

## 2018-01-17 PROCEDURE — 27202125 HC BALLOON, EXTRACTION (ANY): Performed by: INTERNAL MEDICINE

## 2018-01-17 PROCEDURE — 74328 X-RAY BILE DUCT ENDOSCOPY: CPT | Performed by: INTERNAL MEDICINE

## 2018-01-17 PROCEDURE — 90935 HEMODIALYSIS ONE EVALUATION: CPT

## 2018-01-17 PROCEDURE — 27202304 HC CANNULA, ERCP: Performed by: INTERNAL MEDICINE

## 2018-01-17 PROCEDURE — 83735 ASSAY OF MAGNESIUM: CPT

## 2018-01-17 RX ORDER — SODIUM CHLORIDE 9 MG/ML
INJECTION, SOLUTION INTRAVENOUS CONTINUOUS
Status: DISCONTINUED | OUTPATIENT
Start: 2018-01-17 | End: 2018-01-17

## 2018-01-17 RX ORDER — SODIUM CHLORIDE 9 MG/ML
INJECTION, SOLUTION INTRAVENOUS ONCE
Status: COMPLETED | OUTPATIENT
Start: 2018-01-17 | End: 2018-01-17

## 2018-01-17 RX ORDER — PROPOFOL 10 MG/ML
VIAL (ML) INTRAVENOUS
Status: DISCONTINUED | OUTPATIENT
Start: 2018-01-17 | End: 2018-01-17

## 2018-01-17 RX ORDER — SODIUM CHLORIDE 9 MG/ML
INJECTION, SOLUTION INTRAVENOUS
Status: DISCONTINUED | OUTPATIENT
Start: 2018-01-17 | End: 2018-01-26

## 2018-01-17 RX ORDER — SUCCINYLCHOLINE CHLORIDE 20 MG/ML
INJECTION INTRAMUSCULAR; INTRAVENOUS
Status: DISCONTINUED | OUTPATIENT
Start: 2018-01-17 | End: 2018-01-17

## 2018-01-17 RX ORDER — FENTANYL CITRATE 50 UG/ML
INJECTION, SOLUTION INTRAMUSCULAR; INTRAVENOUS
Status: DISCONTINUED | OUTPATIENT
Start: 2018-01-17 | End: 2018-01-17

## 2018-01-17 RX ORDER — CEFEPIME HYDROCHLORIDE 1 G/1
1 INJECTION, POWDER, FOR SOLUTION INTRAMUSCULAR; INTRAVENOUS ONCE
Status: DISCONTINUED | OUTPATIENT
Start: 2018-01-17 | End: 2018-01-17

## 2018-01-17 RX ORDER — MIDAZOLAM HYDROCHLORIDE 1 MG/ML
INJECTION INTRAMUSCULAR; INTRAVENOUS
Status: DISCONTINUED | OUTPATIENT
Start: 2018-01-17 | End: 2018-01-17

## 2018-01-17 RX ORDER — LIDOCAINE HCL/PF 100 MG/5ML
SYRINGE (ML) INTRAVENOUS
Status: DISCONTINUED | OUTPATIENT
Start: 2018-01-17 | End: 2018-01-17

## 2018-01-17 RX ORDER — LIDOCAINE HYDROCHLORIDE 10 MG/ML
1 INJECTION, SOLUTION EPIDURAL; INFILTRATION; INTRACAUDAL; PERINEURAL ONCE
Status: DISCONTINUED | OUTPATIENT
Start: 2018-01-17 | End: 2018-01-17

## 2018-01-17 RX ORDER — ALBUMIN HUMAN 250 G/1000ML
25 SOLUTION INTRAVENOUS EVERY 8 HOURS
Status: COMPLETED | OUTPATIENT
Start: 2018-01-17 | End: 2018-01-18

## 2018-01-17 RX ORDER — SODIUM CHLORIDE 0.9 % (FLUSH) 0.9 %
3 SYRINGE (ML) INJECTION
Status: DISCONTINUED | OUTPATIENT
Start: 2018-01-17 | End: 2018-01-26

## 2018-01-17 RX ORDER — ROCURONIUM BROMIDE 10 MG/ML
INJECTION, SOLUTION INTRAVENOUS
Status: DISCONTINUED | OUTPATIENT
Start: 2018-01-17 | End: 2018-01-17

## 2018-01-17 RX ORDER — ONDANSETRON HYDROCHLORIDE 2 MG/ML
INJECTION, SOLUTION INTRAMUSCULAR; INTRAVENOUS
Status: DISCONTINUED | OUTPATIENT
Start: 2018-01-17 | End: 2018-01-17

## 2018-01-17 RX ADMIN — SUCCINYLCHOLINE CHLORIDE 120 MG: 20 INJECTION, SOLUTION INTRAMUSCULAR; INTRAVENOUS at 09:01

## 2018-01-17 RX ADMIN — HEPARIN SODIUM 5000 UNITS: 5000 INJECTION, SOLUTION INTRAVENOUS; SUBCUTANEOUS at 09:01

## 2018-01-17 RX ADMIN — ONDANSETRON HYDROCHLORIDE 4 MG: 2 INJECTION, SOLUTION INTRAMUSCULAR; INTRAVENOUS at 04:01

## 2018-01-17 RX ADMIN — SODIUM CHLORIDE 1.5 G: 9 INJECTION, SOLUTION INTRAVENOUS at 06:01

## 2018-01-17 RX ADMIN — Medication 100 MG: at 06:01

## 2018-01-17 RX ADMIN — OXYCODONE HYDROCHLORIDE 10 MG: 5 TABLET ORAL at 02:01

## 2018-01-17 RX ADMIN — LEVETIRACETAM 500 MG: 500 TABLET ORAL at 09:01

## 2018-01-17 RX ADMIN — ERYTHROPOIETIN 10000 UNITS: 10000 INJECTION, SOLUTION INTRAVENOUS; SUBCUTANEOUS at 02:01

## 2018-01-17 RX ADMIN — SOYBEAN OIL 250 ML: 20 INJECTION, SOLUTION INTRAVENOUS at 09:01

## 2018-01-17 RX ADMIN — FENTANYL CITRATE 50 MCG: 50 INJECTION, SOLUTION INTRAMUSCULAR; INTRAVENOUS at 10:01

## 2018-01-17 RX ADMIN — MAGNESIUM SULFATE HEPTAHYDRATE: 500 INJECTION, SOLUTION INTRAMUSCULAR; INTRAVENOUS at 09:01

## 2018-01-17 RX ADMIN — FENTANYL CITRATE 100 MCG: 50 INJECTION, SOLUTION INTRAMUSCULAR; INTRAVENOUS at 09:01

## 2018-01-17 RX ADMIN — LEVOTHYROXINE SODIUM 75 MCG: 75 TABLET ORAL at 06:01

## 2018-01-17 RX ADMIN — ONDANSETRON 4 MG: 2 INJECTION, SOLUTION INTRAMUSCULAR; INTRAVENOUS at 10:01

## 2018-01-17 RX ADMIN — ATOVAQUONE 1500 MG: 750 SUSPENSION ORAL at 06:01

## 2018-01-17 RX ADMIN — DOCUSATE SODIUM 100 MG: 100 CAPSULE, LIQUID FILLED ORAL at 06:01

## 2018-01-17 RX ADMIN — PROCHLORPERAZINE EDISYLATE 10 MG: 5 INJECTION INTRAMUSCULAR; INTRAVENOUS at 08:01

## 2018-01-17 RX ADMIN — PROPOFOL 200 MG: 10 INJECTION, EMULSION INTRAVENOUS at 09:01

## 2018-01-17 RX ADMIN — ALBUMIN (HUMAN) 25 G: 12.5 SOLUTION INTRAVENOUS at 04:01

## 2018-01-17 RX ADMIN — MIDAZOLAM HYDROCHLORIDE 2 MG: 1 INJECTION, SOLUTION INTRAMUSCULAR; INTRAVENOUS at 09:01

## 2018-01-17 RX ADMIN — HEPARIN SODIUM 1000 UNITS: 1000 INJECTION, SOLUTION INTRAVENOUS; SUBCUTANEOUS at 02:01

## 2018-01-17 RX ADMIN — ACETAMINOPHEN 650 MG: 325 TABLET ORAL at 04:01

## 2018-01-17 RX ADMIN — URSODIOL 300 MG: 300 CAPSULE ORAL at 09:01

## 2018-01-17 RX ADMIN — VANCOMYCIN 1000 MG: 1 INJECTION, SOLUTION INTRAVENOUS at 05:01

## 2018-01-17 RX ADMIN — OXYCODONE HYDROCHLORIDE 10 MG: 5 TABLET ORAL at 09:01

## 2018-01-17 RX ADMIN — ONDANSETRON HYDROCHLORIDE 4 MG: 2 INJECTION, SOLUTION INTRAMUSCULAR; INTRAVENOUS at 06:01

## 2018-01-17 RX ADMIN — DOCUSATE SODIUM 100 MG: 100 CAPSULE, LIQUID FILLED ORAL at 09:01

## 2018-01-17 RX ADMIN — SODIUM CHLORIDE: 900 INJECTION, SOLUTION INTRAVENOUS at 12:01

## 2018-01-17 RX ADMIN — URSODIOL 300 MG: 300 CAPSULE ORAL at 06:01

## 2018-01-17 RX ADMIN — ALBUMIN (HUMAN) 25 G: 12.5 SOLUTION INTRAVENOUS at 09:01

## 2018-01-17 RX ADMIN — LIDOCAINE HYDROCHLORIDE 75 MG: 20 INJECTION, SOLUTION INTRAVENOUS at 09:01

## 2018-01-17 RX ADMIN — CEFEPIME HYDROCHLORIDE 1 G: 1 INJECTION, POWDER, FOR SOLUTION INTRAMUSCULAR; INTRAVENOUS at 05:01

## 2018-01-17 RX ADMIN — CYCLOSPORINE 100 MG: 100 CAPSULE, LIQUID FILLED ORAL at 05:01

## 2018-01-17 RX ADMIN — ROCURONIUM BROMIDE 5 MG: 10 INJECTION, SOLUTION INTRAVENOUS at 09:01

## 2018-01-17 RX ADMIN — OXYCODONE HYDROCHLORIDE 10 MG: 5 TABLET ORAL at 05:01

## 2018-01-17 RX ADMIN — PANTOPRAZOLE SODIUM 40 MG: 40 TABLET, DELAYED RELEASE ORAL at 06:01

## 2018-01-17 RX ADMIN — BISACODYL 10 MG: 5 TABLET, COATED ORAL at 09:01

## 2018-01-17 RX ADMIN — CYCLOSPORINE 100 MG: 100 CAPSULE, LIQUID FILLED ORAL at 06:01

## 2018-01-17 RX ADMIN — ONDANSETRON HYDROCHLORIDE 4 MG: 2 INJECTION, SOLUTION INTRAMUSCULAR; INTRAVENOUS at 09:01

## 2018-01-17 NOTE — ASSESSMENT & PLAN NOTE
- h/o constipation, reports having regular BM's with bowel regimen  - vomiting episode x 1 at home. Anti-emetics ordered PRN.  - KUB repeated 1/14/18 given increased abdominal pain- mild, generalized bowel distention suggesting ileus.  Diet changed to clears as tolerated for bowel rest   - SHANE tube placed 1/16 for tube feeds. Not tolerating tube feeds, stopped 1/17/18.  - chronic constipation, Cont bowel regiment.   - plan for repeat CT abdomen in next few days to see if other intra-abdominal causes of pain/nausea

## 2018-01-17 NOTE — ASSESSMENT & PLAN NOTE
- post op course complicated by fevers and hyperbilirubinemia  - ERCP 12/6 with post-anastomosis stricture with stent placement  - AST/ALT normal. Tbili/alkphos remain elevated  - Liver US 1/8 showed 3.9cm complex fluid collection anterior to right lobe and moderate nonspecific complex ascites inferior to transplant.   - IR placed drain 1/11/18, cell count negative for infection.  - PBS consulted. PBS not comfortable proceeding with EUS with liver biopsy given fever and possible infectious process going on. Therefore, will proceed with ERCP alone today for stent exchange.  Appreciate PBS assistance.

## 2018-01-17 NOTE — PROGRESS NOTES
Notified Rloando Wagoner in dialysis inpatient that patient will be transported there from pacu, also notified Kadeem Wagoner on transplant unit status of patient and that he will get dialysis before returning to unit.

## 2018-01-17 NOTE — PROVATION PATIENT INSTRUCTIONS
Discharge Summary/Instructions after an Endoscopic Procedure  Patient Name: Jhonny Diana  Patient MRN: [id number]  Patient YOB: 1990 Wednesday, January 17, 2018  Lara Nugent MD  RESTRICTIONS:  During your procedure today, you received medications for sedation.  These   medications may affect your judgment, balance and coordination.  Therefore,   for 24 hours, you have the following restrictions:   - DO NOT drive a car, operate machinery, make legal/financial decisions,   sign important papers or drink alcohol.    ACTIVITY:  The following day: return to full activity including work, except no heavy   lifting, straining or running for 3 days if polyps were removed.  DIET:  Eat and drink normally unless instructed otherwise.     TREATMENT FOR COMMON SIDE EFFECTS:  - Mild abdominal pain, belching, bloating or excessive gas: rest, eat   lightly and use a heating pad.  - Sore Throat: treat with throat lozenges and/or gargle with warm salt   water.  SYMPTOMS TO WATCH FOR AND REPORT TO YOUR PHYSICIAN:  1. Abdominal pain or bloating, other than gas cramps.  2. Chest pain.  3. Back pain.  4. Chills or fever occurring within 24 hours after the procedure.  5. Rectal bleeding, which would show as bright red, maroon, or black stools.   (A tablespoon of blood from the rectum is not serious, especially if   hemorrhoids are present.)  6. Vomiting.  7. Weakness or dizziness.  8. Because air was used during the procedure, expelling large amounts of air   from your rectum or belching is normal.  9. If a bowel prep was taken, you may not have a bowel movement for 1-3   days.  This is normal.  GO DIRECTLY TO THE NEAREST EMERGENCY ROOM IF YOU HAVE ANY OF THE FOLLOWING:      Difficulty breathing  Chills and/or fever over 101 F   Persistent vomiting and/or vomiting blood   Severe abdominal pain   Severe chest pain   Black, tarry stools   Bleeding- more than one tablespoon   Any other symptom or condition that you  may feel needs urgent attention  Your doctor recommends these additional instructions:  If any biopsies were taken, your doctor s clinic will contact you in 1 to 2   weeks with any results.  Resume your previous diet.   Continue your present medications.   Return to your referring physician.   Your physician has recommended a repeat ERCP in two months to remove stent.     For questions, problems or results please call your physician - Lara Nugent MD at Work:  [phone].  [facility], EMERGENCY ROOM PHONE NUMBER: [phone]  IF A COMPLICATION OR EMERGENCY SITUATION ARISES AND YOU ARE UNABLE TO REACH   YOUR PHYSICIAN - GO DIRECTLY TO THE EMERGENCY ROOM.  Lara Nugent MD  1/17/2018 10:27:04 AM  This report has been verified and signed electronically.

## 2018-01-17 NOTE — PROCEDURES
"Jhonny Diana is a 27 y.o. male patient.    Temp: 98.8 °F (37.1 °C) (01/16/18 1652)  Pulse: 93 (01/16/18 1805)  Resp: 18 (01/16/18 1805)  BP: 116/76 (01/16/18 1805)  SpO2: 99 % (01/16/18 1805)  Weight: 57.5 kg (126 lb 12.2 oz) (01/16/18 0500)  Height: 5' 6" (167.6 cm) (01/11/18 0037)       Central Line  Date/Time: 1/16/2018 7:14 PM  Location procedure was performed: [facility] TRANSPLANT STEPDOWN  Performed by: MIKE CARRILLO  Pre-operative Diagnosis: liver transplant  Post-operative diagnosis: liver transplant  Consent Done: Yes  Time out: Immediately prior to procedure a "time out" was called to verify the correct patient, procedure, equipment, support staff and site/side marked as required.  Indications: med administration  Anesthesia: local infiltration    Anesthesia:  Local Anesthetic: lidocaine 1% without epinephrine  Anesthetic total: 5 mL  Preparation: skin prepped with ChloraPrep  Skin prep agent dried: skin prep agent completely dried prior to procedure  Sterile barriers: all five maximum sterile barriers used - cap, mask, sterile gown, sterile gloves, and large sterile sheet  Hand hygiene: hand hygiene performed prior to central venous catheter insertion  Location details: left internal jugular  Catheter type: triple lumen  Catheter size: 7 Fr  Catheter Length: 20cm    Ultrasound guidance: yes  Vessel Caliber: large, patent, compressibility normal  Needle advanced into vessel with real time Ultrasound guidance.  Sterile sheath used.  Manometry: Yes  Number of attempts: 1  Technical procedures used: Seldinger  Complications: none  Post-procedure: line sutured,  chlorhexidine patch,  sterile dressing applied and blood return through all ports  Complications: No          Jessica Carrillo  1/16/2018    "

## 2018-01-17 NOTE — PLAN OF CARE
Problem: Patient Care Overview  Goal: Plan of Care Review  Outcome: Ongoing (interventions implemented as appropriate)  AAOx3, tmax 99.5, c/o pain. Pain controlled by prn pain medication. Pt understands the importance of being NPO for procedure today. SHANE to rt nare. Tube feeds stopped at midnight. TPN/ Lipids started last night.  Plan is for liver biopsy/ ERCP and dialysis today. IV antibiotics given.  Encourage pt to walk more often. Pt in lowest position, side rails up x2, non-skid foot wear in place, call light within reach, pt verbalized understanding to call RN when needed. Mother at bedside. Hand hygiene practiced per protocol. Will continue to monitor.

## 2018-01-17 NOTE — ASSESSMENT & PLAN NOTE
- Infectious work up negative so far, dc antibiotics 1/15.  - CMV PCR from 1/11/18 not detected. Monitor.  - Neupogen given 1/16/18 with improvement in ANC seen.

## 2018-01-17 NOTE — SUBJECTIVE & OBJECTIVE
Scheduled Meds:   sodium chloride 0.9%   Intravenous Once    albumin human 25%  25 g Intravenous Q8H    atovaquone  1,500 mg Oral Daily    bisacodyl  10 mg Oral QHS    ceFEPime (MAXIPIME) IVPB  1 g Intravenous Once    cycloSPORINE modified (NEORAL)  100 mg Oral BID    docusate sodium  100 mg Oral TID    epoetin maurisio (PROCRIT) injection  10,000 Units Intravenous Every Mon, Wed, Fri    ergocalciferol  50,000 Units Oral Q7 Days    fat emulsion 20%  250 mL Intravenous Daily    heparin (porcine)  5,000 Units Subcutaneous Q12H    levETIRAcetam  500 mg Oral BID    levothyroxine  75 mcg Oral Before breakfast    mirtazapine  7.5 mg Oral QHS    multivitamin  1 tablet Oral Daily    pantoprazole  40 mg Oral Daily    polyethylene glycol  17 g Oral Daily    thiamine  100 mg Oral Daily    ursodiol  300 mg Oral BID    vancomycin (VANCOCIN) IVPB  1,000 mg Intravenous Once     Continuous Infusions:   TPN ADULT CENTRAL LINE CUSTOM 40 mL/hr at 01/16/18 2143    TPN ADULT CENTRAL LINE CUSTOM       PRN Meds:sodium chloride 0.9%, sodium chloride 0.9%, acetaminophen, heparin (porcine), ondansetron, ondansetron, oxyCODONE, oxyCODONE, prochlorperazine, sodium chloride 0.9%    Review of Systems   Constitutional: Positive for activity change, appetite change, fatigue and fever. Negative for chills.   HENT: Negative.    Respiratory: Negative for cough, shortness of breath and wheezing.    Cardiovascular: Positive for leg swelling. Negative for chest pain.   Gastrointestinal: Positive for abdominal distention, abdominal pain, constipation, nausea and vomiting.   Genitourinary: Negative for difficulty urinating.        Anuric   Musculoskeletal: Negative for arthralgias, back pain and myalgias.   Skin: Positive for wound. Negative for color change and rash.   Allergic/Immunologic: Positive for immunocompromised state.   Neurological: Positive for weakness. Negative for dizziness, seizures and headaches.    Psychiatric/Behavioral: Positive for decreased concentration. Negative for confusion. The patient is not nervous/anxious.    All other systems reviewed and are negative.    Objective:     Vital Signs (Most Recent):  Temp: 98 °F (36.7 °C) (01/17/18 1115)  Pulse: 86 (01/17/18 1130)  Resp: 18 (01/17/18 1130)  BP: 113/69 (01/17/18 1130)  SpO2: 96 % (01/17/18 1130) Vital Signs (24h Range):  Temp:  [98 °F (36.7 °C)-100.4 °F (38 °C)] 98 °F (36.7 °C)  Pulse:  [] 86  Resp:  [14-22] 18  SpO2:  [95 %-100 %] 96 %  BP: (111-119)/(56-76) 113/69     Weight: 63.4 kg (139 lb 12.4 oz)  Body mass index is 22.56 kg/m².    Intake/Output - Last 3 Shifts       01/15 0700 - 01/16 0659 01/16 0700 - 01/17 0659 01/17 0700 - 01/18 0659    P.O. 360 790     I.V. (mL/kg) 200 (3.5) 100 (1.6) 200 (3.2)    Other 600 0     NG/GT 20 450     IV Piggyback  200     TPN  504.7     Total Intake(mL/kg) 1180 (20.5) 2044.7 (32.3) 200 (3.2)    Urine (mL/kg/hr) 100 (0.1) 70 (0)     Emesis/NG output 0 (0) 0 (0)     Other 1600 (1.2) 0 (0)     Stool 0 (0) 0 (0)     Blood 0 (0) 0 (0)     Total Output 1700 70      Net -520 +1974.7 +200           Urine Occurrence 0 x 0 x     Stool Occurrence 1 x 0 x     Emesis Occurrence 0 x 0 x           Physical Exam   Constitutional: He is oriented to person, place, and time. He appears well-developed. No distress.   Temporal and distal extremity muscle wasting   HENT:   Head: Normocephalic and atraumatic.   Mouth/Throat: No oropharyngeal exudate.   Eyes: EOM are normal. Pupils are equal, round, and reactive to light. Scleral icterus is present.   Neck: Normal range of motion. Neck supple. No JVD present. No thyromegaly present.   Cardiovascular: Normal rate, regular rhythm, normal heart sounds and intact distal pulses.    No murmur heard.  Pulmonary/Chest: Effort normal. No respiratory distress. He has decreased breath sounds in the right middle field, the right lower field and the left lower field. He has no wheezes. He  exhibits no tenderness.   Diminished to RLL   Abdominal: Soft. Bowel sounds are normal. He exhibits distension and ascites. There is tenderness (mild). There is no rebound and no guarding.   Dressing to chevron.  Wd vac removed 1/12/18   Musculoskeletal: Normal range of motion. He exhibits edema (2+ LE edema). He exhibits no tenderness.   Neurological: He is alert and oriented to person, place, and time. He has normal reflexes.   Skin: Skin is warm and dry. Capillary refill takes 2 to 3 seconds. He is not diaphoretic. No erythema.   jaundice   Psychiatric: He has a normal mood and affect. His behavior is normal. Judgment and thought content normal. His mood appears not anxious.   Nursing note and vitals reviewed.      Laboratory:  Immunosuppressants         Stop Route Frequency     cycloSPORINE modified (NEORAL) capsule 100 mg      -- Oral 2 times daily        CBC:     Recent Labs  Lab 01/17/18  0559   WBC 5.33   RBC 2.54*   HGB 8.1*   HCT 24.0*      MCV 95   MCH 31.9*   MCHC 33.8     CMP:     Recent Labs  Lab 01/17/18  0559   *   CALCIUM 9.6   ALBUMIN 2.4*   PROT 5.2*   *   K 3.5   CO2 23      BUN 15   CREATININE 3.8*   ALKPHOS 108   ALT <5*   AST 16   BILITOT 2.0*     Coagulation:     Recent Labs  Lab 01/11/18  0434   INR 1.2     Labs within the past 24 hours have been reviewed.    Diagnostic Results:  I have personally reviewed all pertinent imaging studies.

## 2018-01-17 NOTE — PROGRESS NOTES
[facility], [place] NEPHROLOGY HEMODIALYSIS NOTE     Patient currently on hemodialysis for removal of uremic toxins .     Patient seen and evaluated on hemodialysis, tolerating treatment, see HD flowsheet for vitals and assessments.      No Hypotension, chest pain, shortness of breath, cramping, nausea or vomiting.     Tolerating 2.5 L net UF. Right TDC no issues.     Kay Guillen NP  Nephrology

## 2018-01-17 NOTE — ASSESSMENT & PLAN NOTE
- unclear source of fever, previously thought to be related to peritonitis  - blood cultures prelim NGTD. No urine cx obtained as pt anuric.   - Pt underwent thoracentesis, paracentesis, and drain placement into fluid collection 1/11/18.   - All fluids reviewed and negative for infection. Cultures NGTD.  - ID was following have since signed off. Cefepime d/c 1/15.   -Did have low grade fever this AM, 100.4. Repeat blood cx ordered. On Unasyn currently for ERCP. Will switch back to Vanc/Cefepime given fever.   -Plan to repeat CT scan in next few days to look for other intra-abdominal causes of fever/nausea/abdominal pain pending results of ERCP.  -Will re consult ID tomorrow if continues to spike fevers/pending results of ERCP.

## 2018-01-17 NOTE — PROGRESS NOTES
[facility]  Liver Transplant  Progress Note    Patient Name: Jhonny Diana  MRN: [id number]  Admission Date: 2018  Hospital Length of Stay: 6 days  Code Status: Full Code  Primary Care Provider: Primary Doctor No  Post-Operative Day: 90    ORGAN:   LIVER  Disease Etiology: Acute Alcoholic Hepatitis  Donor Type:    - Brain Death  CDC High Risk:   No  Donor CMV Status:   Donor CMV Status: Positive  Donor HBcAB:   Negative  Donor HCV Status:   Negative  Whole or Partial: Whole Liver  Biliary Anastomosis: End to End  Arterial Anatomy: Standard  Subjective:     History of Present Illness:  Jhonny Diana is a 28 y/o male with past medical history of alcoholic cirrhosis.  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 Found to have peritonitis in November (WBC 5000, 75% PNM) neg for bile leak. He was treated initially with vanc/cefepime. Repeat cell counts  with some improvement (WBC 1400, 45% PNM). He has undergone multiple paracenteses as well as abscess drainage of perihepatic fluid collections and treated with antimicrobial therapy but no positive cultures. Of note, biliary stricture also identified and ERCP performed on 2017 with sphincterotomy and biliary stent placed. Liver tests still have not normalized despite intervention, bilirubin and AP remain elevated. Other pertinent PMH current wound vac in place 2/2 wound infection, malnutrition requiring TPN for short course and ongoing hypoalbuminemia, and seizure activity while on prograf and has since been switched to cyclosporine without reoccurrence.  He presented to the ER for fever, abdominal pain, and N/V. He reports fever (103) for 1 day  prior. Overnight, he developed N/V, reports small amount of green emesis with new left sided pain. He also endorses worsening SOB with exertion. He was scheduled as an outpatient for follow up paracentesis and IR drainage of fluid collection. CXR in ER shows large pleural effusion with subsegmental atelectasis. Infectious work up initiated in ER. His ANC is 900. Broad spectrum antibiotics initiated in ED. He denies chest pain, palpitations, diarrhea, constipation, or back pain. Denies any sick contacts.    Hospital Course:  Thoracentesis (1.2L off), Paracentesis (1.6L off), and IR drainage of fluid collection 1/11, all fluids negative for infection. ID consulted. Broad spectrum antibiotics d/c'd 1/15. Chronically malnourished with poor PO intake, prealbumin 7. Mereta tube placed 1/15 for tube feedings.     Interval History: Patient with continued nausea/vomiting overnight/this AM. Tube feeds on hold since midnight for ERCP today. Patient continues to feel unwell. No BM yesterday. Did have low grade fever this AM, 100.4. Repeat blood cx ordered. On Unasyn currently for ERCP. Will switch back to Vanc/Cefepime given fever. PBS not comfortable proceeding with EUS with liver biopsy given fever and possible infectious process going on. Therefore, will proceed with ERCP alone today.  Plan to repeat CT scan in next few days to look for other intra-abdominal causes of fever/nausea/abdominal pain pending results of ERCP. Albumin 25% 25 x 3 today. Cont TPN tonight. Cont to hold tube feeds today/tonight. Reassess whether to restart tube feeds tomorrow. Monitor.     Scheduled Meds:   sodium chloride 0.9%   Intravenous Once    albumin human 25%  25 g Intravenous Q8H    atovaquone  1,500 mg Oral Daily    bisacodyl  10 mg Oral QHS    ceFEPime (MAXIPIME) IVPB  1 g Intravenous Once    cycloSPORINE modified (NEORAL)  100 mg Oral BID    docusate sodium  100 mg Oral TID    epoetin maurisio (PROCRIT) injection  10,000 Units Intravenous  Every Mon, Wed, Fri    ergocalciferol  50,000 Units Oral Q7 Days    fat emulsion 20%  250 mL Intravenous Daily    heparin (porcine)  5,000 Units Subcutaneous Q12H    levETIRAcetam  500 mg Oral BID    levothyroxine  75 mcg Oral Before breakfast    mirtazapine  7.5 mg Oral QHS    multivitamin  1 tablet Oral Daily    pantoprazole  40 mg Oral Daily    polyethylene glycol  17 g Oral Daily    thiamine  100 mg Oral Daily    ursodiol  300 mg Oral BID    vancomycin (VANCOCIN) IVPB  1,000 mg Intravenous Once     Continuous Infusions:   TPN ADULT CENTRAL LINE CUSTOM 40 mL/hr at 01/16/18 2143    TPN ADULT CENTRAL LINE CUSTOM       PRN Meds:sodium chloride 0.9%, sodium chloride 0.9%, acetaminophen, heparin (porcine), ondansetron, ondansetron, oxyCODONE, oxyCODONE, prochlorperazine, sodium chloride 0.9%    Review of Systems   Constitutional: Positive for activity change, appetite change, fatigue and fever. Negative for chills.   HENT: Negative.    Respiratory: Negative for cough, shortness of breath and wheezing.    Cardiovascular: Positive for leg swelling. Negative for chest pain.   Gastrointestinal: Positive for abdominal distention, abdominal pain, constipation, nausea and vomiting.   Genitourinary: Negative for difficulty urinating.        Anuric   Musculoskeletal: Negative for arthralgias, back pain and myalgias.   Skin: Positive for wound. Negative for color change and rash.   Allergic/Immunologic: Positive for immunocompromised state.   Neurological: Positive for weakness. Negative for dizziness, seizures and headaches.   Psychiatric/Behavioral: Positive for decreased concentration. Negative for confusion. The patient is not nervous/anxious.    All other systems reviewed and are negative.    Objective:     Vital Signs (Most Recent):  Temp: 98 °F (36.7 °C) (01/17/18 1115)  Pulse: 86 (01/17/18 1130)  Resp: 18 (01/17/18 1130)  BP: 113/69 (01/17/18 1130)  SpO2: 96 % (01/17/18 1130) Vital Signs (24h  Range):  Temp:  [98 °F (36.7 °C)-100.4 °F (38 °C)] 98 °F (36.7 °C)  Pulse:  [] 86  Resp:  [14-22] 18  SpO2:  [95 %-100 %] 96 %  BP: (111-119)/(56-76) 113/69     Weight: 63.4 kg (139 lb 12.4 oz)  Body mass index is 22.56 kg/m².    Intake/Output - Last 3 Shifts       01/15 0700 - 01/16 0659 01/16 0700 - 01/17 0659 01/17 0700 - 01/18 0659    P.O. 360 790     I.V. (mL/kg) 200 (3.5) 100 (1.6) 200 (3.2)    Other 600 0     NG/GT 20 450     IV Piggyback  200     TPN  504.7     Total Intake(mL/kg) 1180 (20.5) 2044.7 (32.3) 200 (3.2)    Urine (mL/kg/hr) 100 (0.1) 70 (0)     Emesis/NG output 0 (0) 0 (0)     Other 1600 (1.2) 0 (0)     Stool 0 (0) 0 (0)     Blood 0 (0) 0 (0)     Total Output 1700 70      Net -520 +1974.7 +200           Urine Occurrence 0 x 0 x     Stool Occurrence 1 x 0 x     Emesis Occurrence 0 x 0 x           Physical Exam   Constitutional: He is oriented to person, place, and time. He appears well-developed. No distress.   Temporal and distal extremity muscle wasting   HENT:   Head: Normocephalic and atraumatic.   Mouth/Throat: No oropharyngeal exudate.   Eyes: EOM are normal. Pupils are equal, round, and reactive to light. Scleral icterus is present.   Neck: Normal range of motion. Neck supple. No JVD present. No thyromegaly present.   Cardiovascular: Normal rate, regular rhythm, normal heart sounds and intact distal pulses.    No murmur heard.  Pulmonary/Chest: Effort normal. No respiratory distress. He has decreased breath sounds in the right middle field, the right lower field and the left lower field. He has no wheezes. He exhibits no tenderness.   Diminished to RLL   Abdominal: Soft. Bowel sounds are normal. He exhibits distension and ascites. There is tenderness (mild). There is no rebound and no guarding.   Dressing to chevron.  Wd vac removed 1/12/18   Musculoskeletal: Normal range of motion. He exhibits edema (2+ LE edema). He exhibits no tenderness.   Neurological: He is alert and oriented to  person, place, and time. He has normal reflexes.   Skin: Skin is warm and dry. Capillary refill takes 2 to 3 seconds. He is not diaphoretic. No erythema.   jaundice   Psychiatric: He has a normal mood and affect. His behavior is normal. Judgment and thought content normal. His mood appears not anxious.   Nursing note and vitals reviewed.      Laboratory:  Immunosuppressants         Stop Route Frequency     cycloSPORINE modified (NEORAL) capsule 100 mg      -- Oral 2 times daily        CBC:     Recent Labs  Lab 01/17/18  0559   WBC 5.33   RBC 2.54*   HGB 8.1*   HCT 24.0*      MCV 95   MCH 31.9*   MCHC 33.8     CMP:     Recent Labs  Lab 01/17/18  0559   *   CALCIUM 9.6   ALBUMIN 2.4*   PROT 5.2*   *   K 3.5   CO2 23      BUN 15   CREATININE 3.8*   ALKPHOS 108   ALT <5*   AST 16   BILITOT 2.0*     Coagulation:     Recent Labs  Lab 01/11/18  0434   INR 1.2     Labs within the past 24 hours have been reviewed.    Diagnostic Results:  I have personally reviewed all pertinent imaging studies.    Assessment/Plan:     * Fever    - unclear source of fever, previously thought to be related to peritonitis  - blood cultures prelim NGTD. No urine cx obtained as pt anuric.   - Pt underwent thoracentesis, paracentesis, and drain placement into fluid collection 1/11/18.   - All fluids reviewed and negative for infection. Cultures NGTD.  - ID was following have since signed off. Cefepime d/c 1/15.   -Did have low grade fever this AM, 100.4. Repeat blood cx ordered. On Unasyn currently for ERCP. Will switch back to Vanc/Cefepime given fever.   -Plan to repeat CT scan in next few days to look for other intra-abdominal causes of fever/nausea/abdominal pain pending results of ERCP.  -Will re consult ID tomorrow if continues to spike fevers/pending results of ERCP.         Other ascites    - Paracentesis performed 1/11/18 with 1600 ml removed.    - fluid negative for infection.         Severe protein-calorie  malnutrition    - consult dietician.  - SHANE and tube feeds ordered but not tolerating  -TPN started 1/16/18.         Neutropenia    - Infectious work up negative so far, dc antibiotics 1/15.  - CMV PCR from 1/11/18 not detected. Monitor.  - Neupogen given 1/16/18 with improvement in ANC seen.         Nausea and vomiting    - h/o constipation, reports having regular BM's with bowel regimen  - vomiting episode x 1 at home. Anti-emetics ordered PRN.  - KUB repeated 1/14/18 given increased abdominal pain- mild, generalized bowel distention suggesting ileus.  Diet changed to clears as tolerated for bowel rest   - SHANE tube placed 1/16 for tube feeds. Not tolerating tube feeds, stopped 1/17/18.  - chronic constipation, Cont bowel regiment.   - plan for repeat CT abdomen in next few days to see if other intra-abdominal causes of pain/nausea        Pleural effusion, right    - CXR in ER with large right pleural effusion with subjective c/o worsening SOB  - thoracentesis completed- 1200 ml removed.  SOB with significant improvement.   - Cell count reviewed and negative for infection.   - resp even and non labored.  O2 sat 94-98%.        Protein-calorie malnutrition    - Poor PO intake since transplant requiring short course of TPN during previous hospital stay.   - albumin remains decreased but appetite slowly improving per pt.   - Dietary consulted. Will need to closely monitor PO intake.   - supplements also ordered.   - SHANE with tube feeding start 1/16. Stopped 1/17/18, was not tolerating.   - prealbumin 7 on 1/15.   - TPN started 1/16/18.         Peritonitis    - initially diagnosed in November but has since had improved cell counts in recent para's  - IR placed drain in fluid collection and paracentesis performed 1/11/18. Both fluids reviewed and negative for infection based on cell counts.   - F/u on cultures.         Anemia of chronic disease    - responded appropriately to 1 u PRBC given 1/12.  H/H stable.   - Trend  H&H daily.         Delayed surgical wound healing    - wd vac removed 1/12/18. Wound care following, recommend dc wound vac. Wound healing well. Aquacel AG and Mepilex border dressing applied via wound care recs.           At risk for opportunistic infections    - hold valcyte as WBC low.  CMV PCR 1/11 undetected.  - Continue Bactrim for PCP proph  - isavu for fungal prophylaxis 2/2 complicated post-op course        Long-term use of immunosuppressant medication    - Maintenance IS with cyclosporine. MMF on hold for infections and neutropenia. cont to check cyclo level daily. Assess for toxicity and adjust level as needed        Prophylactic immunotherapy    - See long term use of immunosuppression.         Liver transplanted    - post op course complicated by fevers and hyperbilirubinemia  - ERCP 12/6 with post-anastomosis stricture with stent placement  - AST/ALT normal. Tbili/alkphos remain elevated  - Liver US 1/8 showed 3.9cm complex fluid collection anterior to right lobe and moderate nonspecific complex ascites inferior to transplant.   - IR placed drain 1/11/18, cell count negative for infection.  - PBS consulted. PBS not comfortable proceeding with EUS with liver biopsy given fever and possible infectious process going on. Therefore, will proceed with ERCP alone today for stent exchange.  Appreciate PBS assistance.        Acute renal failure with tubular necrosis    - HD resumed on previous admission. Now anuric and dialyzes M-W-F.  - Nephrology following.              VTE Risk Mitigation         Ordered     heparin (porcine) injection 1,000 Units  As needed (PRN)     Route:  Intra-Catheter        01/12/18 1017     heparin (porcine) injection 5,000 Units  Every 12 hours     Route:  Subcutaneous        01/12/18 0926     Medium Risk of VTE  Once      01/11/18 0351     Place sequential compression device  Until discontinued      01/11/18 0351          The patients clinical status was discussed at  multidisplinary rounds, involving transplant surgery, transplant medicine, pharmacy, nursing, nutrition, and social work    Discharge Planning: Not a candidate for discharge at this time.    Monitor kidney function --> may need outpt HD  Monitor HH needs vs rehab for deconditioned status      Citlali Bridges, PA-C  Liver Transplant  [facility]

## 2018-01-17 NOTE — ANESTHESIA POSTPROCEDURE EVALUATION
"Anesthesia Post Evaluation    Patient: Jhonny Diana    Procedure(s) Performed: Procedure(s) (LRB):  ULTRASOUND-ENDOSCOPIC-UPPER (N/A)  ERCP (N/A)    Final Anesthesia Type: general  Patient location during evaluation: PACU  Patient participation: Yes- Able to Participate  Level of consciousness: awake and alert  Post-procedure vital signs: reviewed and stable  Pain management: adequate  Airway patency: patent  PONV status at discharge: No PONV  Anesthetic complications: no      Cardiovascular status: hemodynamically stable and blood pressure returned to baseline  Respiratory status: unassisted and spontaneous ventilation  Hydration status: euvolemic  Follow-up not needed.        Visit Vitals  /69 (BP Location: Left arm, Patient Position: Lying)   Pulse 86   Temp 36.7 °C (98 °F) (Axillary)   Resp 18   Ht 5' 6" (1.676 m)   Wt 63.4 kg (139 lb 12.4 oz)   SpO2 96%   BMI 22.56 kg/m²       Pain/Diogenes Score: Pain Assessment Performed: Yes (1/17/2018 11:25 AM)  Presence of Pain: non-verbal indicators absent (1/17/2018 11:25 AM)  Pain Rating Prior to Med Admin: 8 (1/17/2018  2:18 AM)  Pain Rating Post Med Admin: 3 (1/17/2018  3:18 AM)  Diogenes Score: 9 (1/17/2018 11:25 AM)      "

## 2018-01-17 NOTE — PROGRESS NOTES
HD treatment complete. Duration of treatment 3 hours and 2.5 L removed. Treatment was tolerated well and no complications with access to right chest wall catheter. Catheter flushed with NS and locked with heparin. Capped and taped.

## 2018-01-17 NOTE — TRANSFER OF CARE
"Anesthesia Transfer of Care Note    Patient: Jhonny Diana    Procedure(s) Performed: Procedure(s) (LRB):  ULTRASOUND-ENDOSCOPIC-UPPER (N/A)  ERCP (N/A)    Patient location: PACU    Anesthesia Type: general    Transport from OR: Transported from OR on room air with adequate spontaneous ventilation    Post pain: adequate analgesia    Post assessment: tolerated procedure well and no apparent anesthetic complications    Post vital signs: stable    Level of consciousness: awake, alert and oriented    Nausea/Vomiting: no nausea/vomiting    Complications: none    Transfer of care protocol was followed      Last vitals:   Visit Vitals  /73 (BP Location: Left arm, Patient Position: Lying)   Pulse 100   Temp 37.5 °C (99.5 °F) (Axillary)   Resp (!) 22   Ht 5' 6" (1.676 m)   Wt 63.4 kg (139 lb 12.4 oz)   SpO2 100%   BMI 22.56 kg/m²     "

## 2018-01-17 NOTE — NURSING TRANSFER
Nursing Transfer Note      1/17/2018     Transfer To: Dialysis    Transfer via stretcher    Transfer with     Transported by PCT    Medicines sent: TPN    Chart send with patient: Yes    Notified: Mother    Patient reassessed at: 01/17/18     Upon arrival to floor:

## 2018-01-17 NOTE — PROGRESS NOTES
Left IJ TLC in place. Awaiting CXR to confirm placement. Patient tolerated it well, VSS. Patient's mother notified that the line placement is complete.

## 2018-01-17 NOTE — ASSESSMENT & PLAN NOTE
- Poor PO intake since transplant requiring short course of TPN during previous hospital stay.   - albumin remains decreased but appetite slowly improving per pt.   - Dietary consulted. Will need to closely monitor PO intake.   - supplements also ordered.   - SHANE with tube feeding start 1/16. Stopped 1/17/18, was not tolerating.   - prealbumin 7 on 1/15.   - TPN started 1/16/18.

## 2018-01-18 LAB
ALBUMIN SERPL BCP-MCNC: 2.5 G/DL
ALP SERPL-CCNC: 108 U/L
ALT SERPL W/O P-5'-P-CCNC: <5 U/L
AMORPH CRY UR QL COMP ASSIST: ABNORMAL
ANION GAP SERPL CALC-SCNC: 10 MMOL/L
ANISOCYTOSIS BLD QL SMEAR: SLIGHT
AST SERPL-CCNC: 20 U/L
BACTERIA #/AREA URNS AUTO: ABNORMAL /HPF
BACTERIA SPEC ANAEROBE CULT: NORMAL
BASOPHILS # BLD AUTO: 0.01 K/UL
BASOPHILS NFR BLD: 0.2 %
BILIRUB SERPL-MCNC: 2 MG/DL
BILIRUB UR QL STRIP: NEGATIVE
BUN SERPL-MCNC: 9 MG/DL
CALCIUM SERPL-MCNC: 8.9 MG/DL
CHLORIDE SERPL-SCNC: 104 MMOL/L
CLARITY UR REFRACT.AUTO: ABNORMAL
CO2 SERPL-SCNC: 23 MMOL/L
COLOR UR AUTO: ABNORMAL
CREAT SERPL-MCNC: 2.9 MG/DL
CYCLOSPORINE BLD LC/MS/MS-MCNC: 175 NG/ML
DIFFERENTIAL METHOD: ABNORMAL
EOSINOPHIL # BLD AUTO: 0.2 K/UL
EOSINOPHIL NFR BLD: 3.4 %
ERYTHROCYTE [DISTWIDTH] IN BLOOD BY AUTOMATED COUNT: 16.2 %
EST. GFR  (AFRICAN AMERICAN): 32.5 ML/MIN/1.73 M^2
EST. GFR  (NON AFRICAN AMERICAN): 28.1 ML/MIN/1.73 M^2
FUNGUS SPEC CULT: NORMAL
GLUCOSE SERPL-MCNC: 96 MG/DL
GLUCOSE UR QL STRIP: NEGATIVE
HCT VFR BLD AUTO: 23 %
HGB BLD-MCNC: 7.7 G/DL
HGB UR QL STRIP: NEGATIVE
HYALINE CASTS UR QL AUTO: 7 /LPF
HYPOCHROMIA BLD QL SMEAR: ABNORMAL
IMM GRANULOCYTES # BLD AUTO: 0.15 K/UL
IMM GRANULOCYTES NFR BLD AUTO: 2.8 %
KETONES UR QL STRIP: NEGATIVE
LEUKOCYTE ESTERASE UR QL STRIP: NEGATIVE
LYMPHOCYTES # BLD AUTO: 1.1 K/UL
LYMPHOCYTES NFR BLD: 20.5 %
MAGNESIUM SERPL-MCNC: 2.2 MG/DL
MCH RBC QN AUTO: 31 PG
MCHC RBC AUTO-ENTMCNC: 33.5 G/DL
MCV RBC AUTO: 93 FL
MICROSCOPIC COMMENT: ABNORMAL
MONOCYTES # BLD AUTO: 1 K/UL
MONOCYTES NFR BLD: 18.6 %
NEUTROPHILS # BLD AUTO: 2.9 K/UL
NEUTROPHILS NFR BLD: 54.5 %
NITRITE UR QL STRIP: NEGATIVE
NRBC BLD-RTO: 0 /100 WBC
OVALOCYTES BLD QL SMEAR: ABNORMAL
PH UR STRIP: 5 [PH] (ref 5–8)
PLATELET # BLD AUTO: 188 K/UL
PLATELET BLD QL SMEAR: ABNORMAL
PMV BLD AUTO: 10.9 FL
POIKILOCYTOSIS BLD QL SMEAR: SLIGHT
POLYCHROMASIA BLD QL SMEAR: ABNORMAL
POTASSIUM SERPL-SCNC: 3.2 MMOL/L
PROT SERPL-MCNC: 5.3 G/DL
PROT UR QL STRIP: ABNORMAL
RBC # BLD AUTO: 2.48 M/UL
RBC #/AREA URNS AUTO: 0 /HPF (ref 0–4)
SODIUM SERPL-SCNC: 137 MMOL/L
SP GR UR STRIP: 1.02 (ref 1–1.03)
SQUAMOUS #/AREA URNS AUTO: 1 /HPF
URN SPEC COLLECT METH UR: ABNORMAL
UROBILINOGEN UR STRIP-ACNC: NEGATIVE EU/DL
VANCOMYCIN SERPL-MCNC: 22.4 UG/ML
WBC # BLD AUTO: 5.31 K/UL
WBC #/AREA URNS AUTO: 0 /HPF (ref 0–5)

## 2018-01-18 PROCEDURE — 86480 TB TEST CELL IMMUN MEASURE: CPT

## 2018-01-18 PROCEDURE — 20600001 HC STEP DOWN PRIVATE ROOM

## 2018-01-18 PROCEDURE — 63600175 PHARM REV CODE 636 W HCPCS: Performed by: NURSE PRACTITIONER

## 2018-01-18 PROCEDURE — 80202 ASSAY OF VANCOMYCIN: CPT

## 2018-01-18 PROCEDURE — 63600175 PHARM REV CODE 636 W HCPCS: Performed by: PHYSICIAN ASSISTANT

## 2018-01-18 PROCEDURE — 81001 URINALYSIS AUTO W/SCOPE: CPT

## 2018-01-18 PROCEDURE — 80053 COMPREHEN METABOLIC PANEL: CPT

## 2018-01-18 PROCEDURE — 85025 COMPLETE CBC W/AUTO DIFF WBC: CPT

## 2018-01-18 PROCEDURE — 25000003 PHARM REV CODE 250: Performed by: NURSE PRACTITIONER

## 2018-01-18 PROCEDURE — 63600175 PHARM REV CODE 636 W HCPCS: Mod: JG | Performed by: PHYSICIAN ASSISTANT

## 2018-01-18 PROCEDURE — A4217 STERILE WATER/SALINE, 500 ML: HCPCS | Performed by: PHYSICIAN ASSISTANT

## 2018-01-18 PROCEDURE — P9047 ALBUMIN (HUMAN), 25%, 50ML: HCPCS | Mod: JG | Performed by: PHYSICIAN ASSISTANT

## 2018-01-18 PROCEDURE — B4185 PARENTERAL SOL 10 GM LIPIDS: HCPCS | Performed by: PHYSICIAN ASSISTANT

## 2018-01-18 PROCEDURE — C9113 INJ PANTOPRAZOLE SODIUM, VIA: HCPCS | Performed by: PHYSICIAN ASSISTANT

## 2018-01-18 PROCEDURE — 80158 DRUG ASSAY CYCLOSPORINE: CPT

## 2018-01-18 PROCEDURE — 36415 COLL VENOUS BLD VENIPUNCTURE: CPT

## 2018-01-18 PROCEDURE — 83735 ASSAY OF MAGNESIUM: CPT

## 2018-01-18 PROCEDURE — 87086 URINE CULTURE/COLONY COUNT: CPT

## 2018-01-18 PROCEDURE — 25000003 PHARM REV CODE 250: Performed by: PHYSICIAN ASSISTANT

## 2018-01-18 PROCEDURE — 25500020 PHARM REV CODE 255: Performed by: STUDENT IN AN ORGANIZED HEALTH CARE EDUCATION/TRAINING PROGRAM

## 2018-01-18 PROCEDURE — 99233 SBSQ HOSP IP/OBS HIGH 50: CPT | Mod: ,,, | Performed by: INTERNAL MEDICINE

## 2018-01-18 PROCEDURE — 87040 BLOOD CULTURE FOR BACTERIA: CPT

## 2018-01-18 RX ORDER — DOCUSATE SODIUM 50 MG/5ML
100 LIQUID ORAL 3 TIMES DAILY
Status: DISCONTINUED | OUTPATIENT
Start: 2018-01-18 | End: 2018-01-19

## 2018-01-18 RX ORDER — PANTOPRAZOLE SODIUM 40 MG/10ML
40 INJECTION, POWDER, LYOPHILIZED, FOR SOLUTION INTRAVENOUS DAILY
Status: DISCONTINUED | OUTPATIENT
Start: 2018-01-18 | End: 2018-01-19

## 2018-01-18 RX ORDER — POTASSIUM CHLORIDE 20 MEQ/15ML
40 SOLUTION ORAL ONCE
Status: COMPLETED | OUTPATIENT
Start: 2018-01-18 | End: 2018-01-18

## 2018-01-18 RX ORDER — SODIUM CHLORIDE 9 MG/ML
INJECTION, SOLUTION INTRAVENOUS ONCE
Status: COMPLETED | OUTPATIENT
Start: 2018-01-18 | End: 2018-01-20

## 2018-01-18 RX ORDER — SODIUM CHLORIDE 9 MG/ML
INJECTION, SOLUTION INTRAVENOUS
Status: DISCONTINUED | OUTPATIENT
Start: 2018-01-18 | End: 2018-01-26

## 2018-01-18 RX ORDER — ACETAMINOPHEN 160 MG/5ML
499.2 LIQUID ORAL EVERY 6 HOURS PRN
Status: DISCONTINUED | OUTPATIENT
Start: 2018-01-18 | End: 2018-01-27

## 2018-01-18 RX ORDER — CYCLOSPORINE 100 MG/ML
100 SOLUTION ORAL 2 TIMES DAILY
Status: DISCONTINUED | OUTPATIENT
Start: 2018-01-18 | End: 2018-01-18

## 2018-01-18 RX ORDER — ATOVAQUONE 750 MG/5ML
1500 SUSPENSION ORAL DAILY
Status: DISCONTINUED | OUTPATIENT
Start: 2018-01-19 | End: 2018-01-26

## 2018-01-18 RX ORDER — LEVETIRACETAM 100 MG/ML
500 SOLUTION ORAL 2 TIMES DAILY
Status: DISCONTINUED | OUTPATIENT
Start: 2018-01-18 | End: 2018-01-19

## 2018-01-18 RX ORDER — CYCLOSPORINE 100 MG/ML
100 SOLUTION ORAL 2 TIMES DAILY
Status: DISCONTINUED | OUTPATIENT
Start: 2018-01-18 | End: 2018-01-19

## 2018-01-18 RX ADMIN — OXYCODONE HYDROCHLORIDE 10 MG: 5 TABLET ORAL at 03:01

## 2018-01-18 RX ADMIN — ONDANSETRON HYDROCHLORIDE 4 MG: 2 INJECTION, SOLUTION INTRAMUSCULAR; INTRAVENOUS at 09:01

## 2018-01-18 RX ADMIN — CYCLOSPORINE 100 MG: 100 SOLUTION ORAL at 10:01

## 2018-01-18 RX ADMIN — LEVETIRACETAM 500 MG: 500 TABLET ORAL at 08:01

## 2018-01-18 RX ADMIN — IOHEXOL 15 ML: 350 INJECTION, SOLUTION INTRAVENOUS at 10:01

## 2018-01-18 RX ADMIN — MAGNESIUM SULFATE HEPTAHYDRATE: 500 INJECTION, SOLUTION INTRAMUSCULAR; INTRAVENOUS at 09:01

## 2018-01-18 RX ADMIN — HEPARIN SODIUM 5000 UNITS: 5000 INJECTION, SOLUTION INTRAVENOUS; SUBCUTANEOUS at 09:01

## 2018-01-18 RX ADMIN — CYCLOSPORINE 100 MG: 100 CAPSULE, LIQUID FILLED ORAL at 08:01

## 2018-01-18 RX ADMIN — CEFEPIME HYDROCHLORIDE 1 G: 1 INJECTION, SOLUTION INTRAVENOUS at 10:01

## 2018-01-18 RX ADMIN — URSODIOL 300 MG: 300 CAPSULE ORAL at 09:01

## 2018-01-18 RX ADMIN — LEVOTHYROXINE SODIUM 75 MCG: 75 TABLET ORAL at 05:01

## 2018-01-18 RX ADMIN — ACETAMINOPHEN 650 MG: 325 TABLET ORAL at 04:01

## 2018-01-18 RX ADMIN — LEVETIRACETAM 500 MG: 500 SOLUTION ORAL at 09:01

## 2018-01-18 RX ADMIN — ATOVAQUONE 1500 MG: 750 SUSPENSION ORAL at 08:01

## 2018-01-18 RX ADMIN — ALBUMIN (HUMAN) 25 G: 12.5 SOLUTION INTRAVENOUS at 06:01

## 2018-01-18 RX ADMIN — PANTOPRAZOLE SODIUM 40 MG: 40 TABLET, DELAYED RELEASE ORAL at 08:01

## 2018-01-18 RX ADMIN — ONDANSETRON HYDROCHLORIDE 4 MG: 2 INJECTION, SOLUTION INTRAMUSCULAR; INTRAVENOUS at 03:01

## 2018-01-18 RX ADMIN — DOCUSATE SODIUM 100 MG: 100 CAPSULE, LIQUID FILLED ORAL at 05:01

## 2018-01-18 RX ADMIN — DOCUSATE SODIUM 100 MG: 50 LIQUID ORAL at 09:01

## 2018-01-18 RX ADMIN — ONDANSETRON HYDROCHLORIDE 4 MG: 2 INJECTION, SOLUTION INTRAMUSCULAR; INTRAVENOUS at 08:01

## 2018-01-18 RX ADMIN — SOYBEAN OIL 250 ML: 20 INJECTION, SOLUTION INTRAVENOUS at 09:01

## 2018-01-18 RX ADMIN — POLYETHYLENE GLYCOL 3350 17 G: 17 POWDER, FOR SOLUTION ORAL at 08:01

## 2018-01-18 RX ADMIN — THERA TABS 1 TABLET: TAB at 08:01

## 2018-01-18 RX ADMIN — HEPARIN SODIUM 5000 UNITS: 5000 INJECTION, SOLUTION INTRAVENOUS; SUBCUTANEOUS at 10:01

## 2018-01-18 RX ADMIN — POTASSIUM CHLORIDE 40 MEQ: 20 SOLUTION ORAL at 03:01

## 2018-01-18 RX ADMIN — PROCHLORPERAZINE EDISYLATE 10 MG: 5 INJECTION INTRAMUSCULAR; INTRAVENOUS at 09:01

## 2018-01-18 RX ADMIN — Medication 100 MG: at 08:01

## 2018-01-18 RX ADMIN — ACETAMINOPHEN 650 MG: 325 TABLET ORAL at 02:01

## 2018-01-18 RX ADMIN — CEFEPIME HYDROCHLORIDE 1 G: 1 INJECTION, SOLUTION INTRAVENOUS at 09:01

## 2018-01-18 RX ADMIN — PANTOPRAZOLE SODIUM 40 MG: 40 INJECTION, POWDER, FOR SOLUTION INTRAVENOUS at 09:01

## 2018-01-18 RX ADMIN — OXYCODONE HYDROCHLORIDE 10 MG: 5 TABLET ORAL at 02:01

## 2018-01-18 RX ADMIN — OXYCODONE HYDROCHLORIDE 10 MG: 5 TABLET ORAL at 07:01

## 2018-01-18 RX ADMIN — URSODIOL 300 MG: 300 CAPSULE ORAL at 08:01

## 2018-01-18 NOTE — ASSESSMENT & PLAN NOTE
- Paracentesis performed 1/11/18 with 1600 ml removed.    - fluid negative for infection.   - Repeat para in AM

## 2018-01-18 NOTE — ASSESSMENT & PLAN NOTE
- CXR in ER with large right pleural effusion with subjective c/o worsening SOB  - thoracentesis completed- 1200 ml removed.  SOB with significant improvement.   - Cell count reviewed and negative for infection.   - resp even and non labored.  O2 sat 94-98%.  - Repeat thora in AM

## 2018-01-18 NOTE — PROGRESS NOTES
EZEKIEL received a fax from [facility] stating that [facility], pts insurance denying wound vac coverage from 1/04-/2/2/18.  Pt had wound vac removed on 1/12/18.   They are requesting a peer to peer phone review.    EZEKIEL emailed [facility] documents to current RIMA Vera who is following the patient in the hospital setting.  She will contact [facility] about the dates of coverage for 1/04/18 through 1/12/18 when would vac removed.   EZEKIEL remains available.

## 2018-01-18 NOTE — ASSESSMENT & PLAN NOTE
- unclear source of fever, previously thought to be related to peritonitis  - blood cultures prelim NGTD. No urine cx obtained as pt anuric.   - Pt underwent thoracentesis, paracentesis, and drain placement into fluid collection 1/11/18.   - All fluids reviewed and negative for infection. Cultures NGTD.  - ID was following have since signed off. Cefepime d/c 1/15.   - Again with fever today. Repeat blood cx NGTD. Resumed Vanc/Cefepime given fever 1/17.  - ID reconsulted  - CT C/A/P with large R pleural effusion and ascites --> both will be drained tomorrow  - Will also plan for liver bx with CMV immunostain per ID recs.

## 2018-01-18 NOTE — ASSESSMENT & PLAN NOTE
- Maintenance IS with cyclosporine. MMF on hold for infections and neutropenia. Continue to check cyclosporine level daily. Assess for toxicity and adjust level as needed.

## 2018-01-18 NOTE — ASSESSMENT & PLAN NOTE
- h/o constipation, reports having regular BM's with bowel regimen  - vomiting episode x 1 at home. Anti-emetics ordered PRN.  - KUB repeated 1/14/18 given increased abdominal pain- mild, generalized bowel distention suggesting ileus.  Diet changed to clears as tolerated for bowel rest   - SHANE tube placed 1/16 for tube feeds. Not tolerating tube feeds, stopped 1/17/18.  - chronic constipation, Cont bowel regiment.

## 2018-01-18 NOTE — PROGRESS NOTES
SW presented to patient for follow up and continuity of care.  The patient was observed to be laying in bed sleeping soundly.  The patient was accompanied by his mom Sanjuanita who reported adequate coping and made appropriate eye contact when communicating with SW. Mother of the patient denied any needs or concerns at this time.  Sanjuanita reported that she is doing well but hopes the patient gets well soon.  Pt still has an NG tube and receiving nutrition.  Pts mother  denied any increased mental health symptoms.  SW provided emotional support, reflective listening and normalization.  No discharge plans identified at this time. SW remains available.

## 2018-01-18 NOTE — ASSESSMENT & PLAN NOTE
- post op course complicated by fevers and hyperbilirubinemia  - ERCP 12/6 with post-anastomosis stricture with stent placement  - AST/ALT normal. Tbili/alkphos remain elevated  - Liver US 1/8 showed 3.9cm complex fluid collection anterior to right lobe and moderate nonspecific complex ascites inferior to transplant.   - IR placed drain 1/11/18, cell count negative for infection.  - PBS consulted. PBS not comfortable proceeding with EUS with liver biopsy given fever and possible infectious process going on. ERCP 1/17 with stone and sludge.  Pt placed on Actigall.

## 2018-01-18 NOTE — SUBJECTIVE & OBJECTIVE
Scheduled Meds:   sodium chloride 0.9%   Intravenous Once    [START ON 1/19/2018] atovaquone  1,500 mg Per NG tube Daily    ceFEPime (MAXIPIME) IVPB  1 g Intravenous Q12H    cycloSPORINE modified (NEORAL)  100 mg Oral BID    docusate  100 mg Per NG tube TID    epoetin maurisio (PROCRIT) injection  10,000 Units Intravenous Every Mon, Wed, Fri    fat emulsion 20%  250 mL Intravenous Daily    heparin (porcine)  5,000 Units Subcutaneous Q12H    levetiracetam oral soln  500 mg Per NG tube BID    levothyroxine  75 mcg Oral Before breakfast    pantoprazole  40 mg Oral Daily    pantoprazole  40 mg Intravenous Daily    polyethylene glycol  17 g Oral Daily    potassium chloride 10%  40 mEq Oral Once    ursodiol  300 mg Per NG tube BID     Continuous Infusions:   TPN ADULT CENTRAL LINE CUSTOM 40 mL/hr at 01/17/18 2154    TPN ADULT CENTRAL LINE CUSTOM       PRN Meds:sodium chloride 0.9%, sodium chloride 0.9%, sodium chloride 0.9%, acetaminophen, heparin (porcine), omnipaque, ondansetron, ondansetron, oxyCODONE, oxyCODONE, prochlorperazine, sodium chloride 0.9%    Review of Systems   Constitutional: Positive for activity change, appetite change, fatigue and fever. Negative for chills.   HENT: Negative.    Respiratory: Negative for cough, shortness of breath and wheezing.    Cardiovascular: Positive for leg swelling. Negative for chest pain.   Gastrointestinal: Positive for abdominal distention, abdominal pain, constipation, nausea and vomiting.   Genitourinary: Negative for difficulty urinating.        Anuric   Musculoskeletal: Negative for arthralgias, back pain and myalgias.   Skin: Positive for wound. Negative for color change and rash.   Allergic/Immunologic: Positive for immunocompromised state.   Neurological: Positive for weakness. Negative for dizziness, seizures and headaches.   Psychiatric/Behavioral: Positive for decreased concentration. Negative for confusion. The patient is not nervous/anxious.    All  other systems reviewed and are negative.    Objective:     Vital Signs (Most Recent):  Temp: 100.2 °F (37.9 °C) (01/18/18 1535)  Pulse: (!) 114 (01/18/18 1535)  Resp: 18 (01/18/18 1535)  BP: 116/63 (01/18/18 1535)  SpO2: 96 % (01/18/18 1535) Vital Signs (24h Range):  Temp:  [98.6 °F (37 °C)-102.4 °F (39.1 °C)] 100.2 °F (37.9 °C)  Pulse:  [] 114  Resp:  [16-18] 18  SpO2:  [96 %-98 %] 96 %  BP: (105-127)/(56-76) 116/63     Weight: 68 kg (149 lb 14.6 oz)  Body mass index is 24.2 kg/m².    Intake/Output - Last 3 Shifts       01/16 0700 - 01/17 0659 01/17 0700 - 01/18 0659 01/18 0700 - 01/19 0659    P.O. 790 180 80    I.V. (mL/kg) 100 (1.6) 400 (5.9)     Other 0 600     NG/  450    IV Piggyback 200  50    .7 493.2 320    Total Intake(mL/kg) 2044.7 (32.3) 1673.2 (24.6) 900 (13.2)    Urine (mL/kg/hr) 70 (0) 0 (0) 0 (0)    Emesis/NG output 0 (0) 0 (0) 0 (0)    Other 0 (0) 3107 (1.9)     Stool 0 (0) 0 (0) 0 (0)    Blood 0 (0) 0 (0)     Total Output 70 3107 0    Net +1974.7 -1433.8 +900           Urine Occurrence 0 x 0 x 0 x    Stool Occurrence 0 x 0 x 0 x    Emesis Occurrence 0 x 0 x 0 x          Physical Exam   Constitutional: He is oriented to person, place, and time. He appears well-developed. No distress.   Temporal and distal extremity muscle wasting   HENT:   Head: Normocephalic and atraumatic.   Mouth/Throat: No oropharyngeal exudate.   Eyes: EOM are normal. Pupils are equal, round, and reactive to light. Scleral icterus is present.   Neck: Normal range of motion. Neck supple. No JVD present. No thyromegaly present.   Cardiovascular: Normal rate, regular rhythm, normal heart sounds and intact distal pulses.    No murmur heard.  Pulmonary/Chest: Effort normal. No respiratory distress. He has decreased breath sounds in the right middle field, the right lower field and the left lower field. He has no wheezes. He exhibits no tenderness.   Diminished to RLL   Abdominal: Soft. Bowel sounds are normal. He  exhibits distension and ascites. There is tenderness (mild). There is no rebound and no guarding.   Dressing to chevron.  Wd vac removed 1/12/18   Musculoskeletal: Normal range of motion. He exhibits edema (2+ LE edema). He exhibits no tenderness.   Neurological: He is alert and oriented to person, place, and time. He has normal reflexes.   Skin: Skin is warm and dry. Capillary refill takes 2 to 3 seconds. He is not diaphoretic. No erythema.   jaundice   Psychiatric: He has a normal mood and affect. His behavior is normal. Judgment and thought content normal. His mood appears not anxious.   Nursing note and vitals reviewed.      Laboratory:  Immunosuppressants         Stop Route Frequency     cycloSPORINE modified (NEORAL) capsule 100 mg      -- Oral 2 times daily        CBC:     Recent Labs  Lab 01/18/18  0403   WBC 5.31   RBC 2.48*   HGB 7.7*   HCT 23.0*      MCV 93   MCH 31.0   MCHC 33.5     CMP:     Recent Labs  Lab 01/18/18  0403   GLU 96   CALCIUM 8.9   ALBUMIN 2.5*   PROT 5.3*      K 3.2*   CO2 23      BUN 9   CREATININE 2.9*   ALKPHOS 108   ALT <5*   AST 20   BILITOT 2.0*     Coagulation:   No results for input(s): PT, INR, APTT in the last 168 hours.  Labs within the past 24 hours have been reviewed.    Diagnostic Results:  I have personally reviewed all pertinent imaging studies.

## 2018-01-19 LAB
ALBUMIN FLD-MCNC: 1.1 G/DL
ALBUMIN SERPL BCP-MCNC: 2.3 G/DL
ALP SERPL-CCNC: 115 U/L
ALT SERPL W/O P-5'-P-CCNC: 5 U/L
ANION GAP SERPL CALC-SCNC: 8 MMOL/L
APPEARANCE FLD: CLEAR
APPEARANCE FLD: CLEAR
AST SERPL-CCNC: 21 U/L
BACTERIA SPEC ANAEROBE CULT: NORMAL
BACTERIA SPEC ANAEROBE CULT: NORMAL
BACTERIA UR CULT: NO GROWTH
BASOPHILS # BLD AUTO: 0.03 K/UL
BASOPHILS NFR BLD: 0.5 %
BILIRUB FLD-MCNC: 1.3 MG/DL
BILIRUB SERPL-MCNC: 2.1 MG/DL
BLD PROD TYP BPU: NORMAL
BLOOD UNIT EXPIRATION DATE: NORMAL
BLOOD UNIT TYPE CODE: 5100
BLOOD UNIT TYPE: NORMAL
BODY FLD TYPE: NORMAL
BODY FLD TYPE: NORMAL
BODY FLUID SOURCE, BILIRUBIN: NORMAL
BUN SERPL-MCNC: 17 MG/DL
CALCIUM SERPL-MCNC: 9.4 MG/DL
CHLORIDE SERPL-SCNC: 102 MMOL/L
CMV DNA SERPL NAA+PROBE-ACNC: NORMAL IU/ML
CO2 SERPL-SCNC: 25 MMOL/L
CODING SYSTEM: NORMAL
COLOR FLD: YELLOW
COLOR FLD: YELLOW
CREAT SERPL-MCNC: 3.9 MG/DL
CYCLOSPORINE BLD LC/MS/MS-MCNC: 185 NG/ML
DIFFERENTIAL METHOD: ABNORMAL
DISPENSE STATUS: NORMAL
EOSINOPHIL # BLD AUTO: 0.2 K/UL
EOSINOPHIL NFR BLD: 4.3 %
EOSINOPHIL NFR FLD MANUAL: 3 %
ERYTHROCYTE [DISTWIDTH] IN BLOOD BY AUTOMATED COUNT: 17.2 %
EST. GFR  (AFRICAN AMERICAN): 22.7 ML/MIN/1.73 M^2
EST. GFR  (NON AFRICAN AMERICAN): 19.7 ML/MIN/1.73 M^2
GLUCOSE SERPL-MCNC: 100 MG/DL
GRAM STN SPEC: NORMAL
GRAM STN SPEC: NORMAL
HCT VFR BLD AUTO: 22.8 %
HGB BLD-MCNC: 7.7 G/DL
IMM GRANULOCYTES # BLD AUTO: 0.21 K/UL
IMM GRANULOCYTES NFR BLD AUTO: 3.8 %
LYMPHOCYTES # BLD AUTO: 1.2 K/UL
LYMPHOCYTES NFR BLD: 21.3 %
LYMPHOCYTES NFR FLD MANUAL: 36 %
LYMPHOCYTES NFR FLD MANUAL: 81 %
MAGNESIUM SERPL-MCNC: 2.5 MG/DL
MCH RBC QN AUTO: 31.3 PG
MCHC RBC AUTO-ENTMCNC: 33.8 G/DL
MCV RBC AUTO: 93 FL
MESOTHL CELL NFR FLD MANUAL: 1 %
MONOCYTES # BLD AUTO: 1.1 K/UL
MONOCYTES NFR BLD: 19.6 %
MONOS+MACROS NFR FLD MANUAL: 18 %
MONOS+MACROS NFR FLD MANUAL: 27 %
NEUTROPHILS # BLD AUTO: 2.8 K/UL
NEUTROPHILS NFR BLD: 50.5 %
NEUTROPHILS NFR FLD MANUAL: 1 %
NEUTROPHILS NFR FLD MANUAL: 33 %
NRBC BLD-RTO: 0 /100 WBC
PLATELET # BLD AUTO: 193 K/UL
PMV BLD AUTO: 11 FL
POTASSIUM SERPL-SCNC: 4 MMOL/L
PROT FLD-MCNC: 1.9 G/DL
PROT SERPL-MCNC: 5.3 G/DL
RBC # BLD AUTO: 2.46 M/UL
SODIUM SERPL-SCNC: 135 MMOL/L
SPECIMEN SOURCE: NORMAL
SPECIMEN SOURCE: NORMAL
TRANS ERYTHROCYTES VOL PATIENT: NORMAL ML
VANCOMYCIN SERPL-MCNC: 18.8 UG/ML
WBC # BLD AUTO: 5.55 K/UL
WBC # FLD: 231 /CU MM
WBC # FLD: 42 /CU MM

## 2018-01-19 PROCEDURE — 89051 BODY FLUID CELL COUNT: CPT | Mod: 91

## 2018-01-19 PROCEDURE — P9021 RED BLOOD CELLS UNIT: HCPCS

## 2018-01-19 PROCEDURE — 82042 OTHER SOURCE ALBUMIN QUAN EA: CPT

## 2018-01-19 PROCEDURE — 0W9G3ZZ DRAINAGE OF PERITONEAL CAVITY, PERCUTANEOUS APPROACH: ICD-10-PCS | Performed by: INTERNAL MEDICINE

## 2018-01-19 PROCEDURE — 87116 MYCOBACTERIA CULTURE: CPT

## 2018-01-19 PROCEDURE — 63600175 PHARM REV CODE 636 W HCPCS: Performed by: NURSE PRACTITIONER

## 2018-01-19 PROCEDURE — A4217 STERILE WATER/SALINE, 500 ML: HCPCS | Performed by: PHYSICIAN ASSISTANT

## 2018-01-19 PROCEDURE — 25000003 PHARM REV CODE 250: Performed by: STUDENT IN AN ORGANIZED HEALTH CARE EDUCATION/TRAINING PROGRAM

## 2018-01-19 PROCEDURE — 99233 SBSQ HOSP IP/OBS HIGH 50: CPT | Mod: ,,, | Performed by: INTERNAL MEDICINE

## 2018-01-19 PROCEDURE — 97803 MED NUTRITION INDIV SUBSEQ: CPT | Performed by: DIETITIAN, REGISTERED

## 2018-01-19 PROCEDURE — 25000003 PHARM REV CODE 250: Performed by: NURSE PRACTITIONER

## 2018-01-19 PROCEDURE — 80202 ASSAY OF VANCOMYCIN: CPT

## 2018-01-19 PROCEDURE — 63600175 PHARM REV CODE 636 W HCPCS: Performed by: PHYSICIAN ASSISTANT

## 2018-01-19 PROCEDURE — 84478 ASSAY OF TRIGLYCERIDES: CPT

## 2018-01-19 PROCEDURE — C9113 INJ PANTOPRAZOLE SODIUM, VIA: HCPCS | Performed by: PHYSICIAN ASSISTANT

## 2018-01-19 PROCEDURE — B4185 PARENTERAL SOL 10 GM LIPIDS: HCPCS | Performed by: PHYSICIAN ASSISTANT

## 2018-01-19 PROCEDURE — 88305 TISSUE EXAM BY PATHOLOGIST: CPT | Performed by: PATHOLOGY

## 2018-01-19 PROCEDURE — 83735 ASSAY OF MAGNESIUM: CPT

## 2018-01-19 PROCEDURE — 84157 ASSAY OF PROTEIN OTHER: CPT

## 2018-01-19 PROCEDURE — 80158 DRUG ASSAY CYCLOSPORINE: CPT

## 2018-01-19 PROCEDURE — 85025 COMPLETE CBC W/AUTO DIFF WBC: CPT

## 2018-01-19 PROCEDURE — 87102 FUNGUS ISOLATION CULTURE: CPT | Mod: 59

## 2018-01-19 PROCEDURE — 63600175 PHARM REV CODE 636 W HCPCS: Performed by: STUDENT IN AN ORGANIZED HEALTH CARE EDUCATION/TRAINING PROGRAM

## 2018-01-19 PROCEDURE — 0W993ZZ DRAINAGE OF RIGHT PLEURAL CAVITY, PERCUTANEOUS APPROACH: ICD-10-PCS | Performed by: RADIOLOGY

## 2018-01-19 PROCEDURE — 88305 TISSUE EXAM BY PATHOLOGIST: CPT | Mod: 26,,, | Performed by: PATHOLOGY

## 2018-01-19 PROCEDURE — 80053 COMPREHEN METABOLIC PANEL: CPT

## 2018-01-19 PROCEDURE — 87075 CULTR BACTERIA EXCEPT BLOOD: CPT

## 2018-01-19 PROCEDURE — 87070 CULTURE OTHR SPECIMN AEROBIC: CPT

## 2018-01-19 PROCEDURE — 87015 SPECIMEN INFECT AGNT CONCNTJ: CPT

## 2018-01-19 PROCEDURE — 87205 SMEAR GRAM STAIN: CPT

## 2018-01-19 PROCEDURE — 88112 CYTOPATH CELL ENHANCE TECH: CPT | Mod: 26,,, | Performed by: PATHOLOGY

## 2018-01-19 PROCEDURE — 87070 CULTURE OTHR SPECIMN AEROBIC: CPT | Mod: 59

## 2018-01-19 PROCEDURE — 20600001 HC STEP DOWN PRIVATE ROOM

## 2018-01-19 PROCEDURE — 82247 BILIRUBIN TOTAL: CPT

## 2018-01-19 PROCEDURE — 87496 CYTOMEG DNA AMP PROBE: CPT

## 2018-01-19 PROCEDURE — 87205 SMEAR GRAM STAIN: CPT | Mod: 59

## 2018-01-19 PROCEDURE — 25000003 PHARM REV CODE 250: Performed by: PHYSICIAN ASSISTANT

## 2018-01-19 PROCEDURE — 99233 SBSQ HOSP IP/OBS HIGH 50: CPT | Mod: ,,, | Performed by: PHYSICIAN ASSISTANT

## 2018-01-19 RX ORDER — MIDAZOLAM HYDROCHLORIDE 1 MG/ML
1 INJECTION, SOLUTION INTRAMUSCULAR; INTRAVENOUS ONCE
Status: COMPLETED | OUTPATIENT
Start: 2018-01-19 | End: 2018-01-19

## 2018-01-19 RX ORDER — LEVETIRACETAM 500 MG/1
500 TABLET ORAL 2 TIMES DAILY
Status: DISCONTINUED | OUTPATIENT
Start: 2018-01-19 | End: 2018-02-02

## 2018-01-19 RX ORDER — OXYCODONE HYDROCHLORIDE 5 MG/1
5 TABLET ORAL ONCE
Status: COMPLETED | OUTPATIENT
Start: 2018-01-19 | End: 2018-01-19

## 2018-01-19 RX ORDER — URSODIOL 300 MG/1
300 CAPSULE ORAL 2 TIMES DAILY
Status: DISCONTINUED | OUTPATIENT
Start: 2018-01-19 | End: 2018-02-02

## 2018-01-19 RX ORDER — PSEUDOEPHEDRINE/ACETAMINOPHEN 30MG-500MG
100 TABLET ORAL ONCE
Status: DISCONTINUED | OUTPATIENT
Start: 2018-01-19 | End: 2018-01-21 | Stop reason: SDUPTHER

## 2018-01-19 RX ORDER — HYDROCODONE BITARTRATE AND ACETAMINOPHEN 500; 5 MG/1; MG/1
TABLET ORAL
Status: DISCONTINUED | OUTPATIENT
Start: 2018-01-19 | End: 2018-01-21 | Stop reason: SDUPTHER

## 2018-01-19 RX ORDER — PANTOPRAZOLE SODIUM 40 MG/1
40 TABLET, DELAYED RELEASE ORAL DAILY
Status: DISCONTINUED | OUTPATIENT
Start: 2018-01-20 | End: 2018-01-22

## 2018-01-19 RX ORDER — DOCUSATE SODIUM 100 MG/1
100 CAPSULE, LIQUID FILLED ORAL
Status: DISCONTINUED | OUTPATIENT
Start: 2018-01-19 | End: 2018-01-29

## 2018-01-19 RX ORDER — SYRING-NEEDL,DISP,INSUL,0.3 ML 29 G X1/2"
300 SYRINGE, EMPTY DISPOSABLE MISCELLANEOUS ONCE
Status: DISCONTINUED | OUTPATIENT
Start: 2018-01-19 | End: 2018-01-21 | Stop reason: SDUPTHER

## 2018-01-19 RX ADMIN — PANTOPRAZOLE SODIUM 40 MG: 40 INJECTION, POWDER, FOR SOLUTION INTRAVENOUS at 06:01

## 2018-01-19 RX ADMIN — MIDAZOLAM HYDROCHLORIDE 1 MG: 1 INJECTION, SOLUTION INTRAMUSCULAR; INTRAVENOUS at 10:01

## 2018-01-19 RX ADMIN — SOYBEAN OIL 250 ML: 20 INJECTION, SOLUTION INTRAVENOUS at 09:01

## 2018-01-19 RX ADMIN — OXYCODONE HYDROCHLORIDE 10 MG: 5 TABLET ORAL at 12:01

## 2018-01-19 RX ADMIN — LEVOTHYROXINE SODIUM 75 MCG: 75 TABLET ORAL at 06:01

## 2018-01-19 RX ADMIN — DOCUSATE SODIUM 100 MG: 50 LIQUID ORAL at 01:01

## 2018-01-19 RX ADMIN — OXYCODONE HYDROCHLORIDE 5 MG: 5 TABLET ORAL at 08:01

## 2018-01-19 RX ADMIN — URSODIOL 300 MG: 300 CAPSULE ORAL at 06:01

## 2018-01-19 RX ADMIN — CYCLOSPORINE 100 MG: 25 CAPSULE, LIQUID FILLED ORAL at 05:01

## 2018-01-19 RX ADMIN — CEFEPIME HYDROCHLORIDE 1 G: 1 INJECTION, SOLUTION INTRAVENOUS at 09:01

## 2018-01-19 RX ADMIN — OXYCODONE HYDROCHLORIDE 10 MG: 5 TABLET ORAL at 04:01

## 2018-01-19 RX ADMIN — DOCUSATE SODIUM 100 MG: 100 CAPSULE, LIQUID FILLED ORAL at 08:01

## 2018-01-19 RX ADMIN — ATOVAQUONE 1500 MG: 750 SUSPENSION ORAL at 06:01

## 2018-01-19 RX ADMIN — ONDANSETRON 8 MG: 8 TABLET, ORALLY DISINTEGRATING ORAL at 08:01

## 2018-01-19 RX ADMIN — OXYCODONE HYDROCHLORIDE 10 MG: 5 TABLET ORAL at 07:01

## 2018-01-19 RX ADMIN — MAGNESIUM SULFATE HEPTAHYDRATE: 500 INJECTION, SOLUTION INTRAMUSCULAR; INTRAVENOUS at 09:01

## 2018-01-19 RX ADMIN — OXYCODONE HYDROCHLORIDE 10 MG: 5 TABLET ORAL at 09:01

## 2018-01-19 RX ADMIN — LEVETIRACETAM 500 MG: 500 SOLUTION ORAL at 09:01

## 2018-01-19 RX ADMIN — HEPARIN SODIUM 5000 UNITS: 5000 INJECTION, SOLUTION INTRAVENOUS; SUBCUTANEOUS at 09:01

## 2018-01-19 RX ADMIN — ONDANSETRON HYDROCHLORIDE 4 MG: 2 INJECTION, SOLUTION INTRAMUSCULAR; INTRAVENOUS at 10:01

## 2018-01-19 RX ADMIN — CYCLOSPORINE 100 MG: 100 SOLUTION ORAL at 06:01

## 2018-01-19 RX ADMIN — LEVETIRACETAM 500 MG: 500 TABLET ORAL at 09:01

## 2018-01-19 RX ADMIN — PROCHLORPERAZINE EDISYLATE 10 MG: 5 INJECTION INTRAMUSCULAR; INTRAVENOUS at 09:01

## 2018-01-19 RX ADMIN — URSODIOL 300 MG: 300 CAPSULE ORAL at 09:01

## 2018-01-19 NOTE — PLAN OF CARE
VSS and afebrile. Bed in lower and locked position, call light within reach and non-skid socks on when ambulating. Pt verbalized to call for assistance. Pt is free from falls/injury thus far. Mom at the bedside. TPN @ 40cc/hr. PRN Oxy 10 given X2 thus far. Care on going. Will continue to monitor.

## 2018-01-19 NOTE — PROGRESS NOTES
[facility]  Liver Transplant  Progress Note    Patient Name: Jhonny Diana  MRN: [id number]  Admission Date: 2018  Hospital Length of Stay: 8 days  Code Status: Full Code  Primary Care Provider: Primary Doctor No  Post-Operative Day: 92    ORGAN:   LIVER  Disease Etiology: Acute Alcoholic Hepatitis  Donor Type:    - Brain Death  CDC High Risk:   No  Donor CMV Status:   Donor CMV Status: Positive  Donor HBcAB:   Negative  Donor HCV Status:   Negative  Whole or Partial: Whole Liver  Biliary Anastomosis: End to End  Arterial Anatomy: Standard  Subjective:     History of Present Illness:  Jhonny Diana is a 26 y/o male with past medical history of alcoholic cirrhosis.  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 Found to have peritonitis in November (WBC 5000, 75% PNM) neg for bile leak. He was treated initially with vanc/cefepime. Repeat cell counts  with some improvement (WBC 1400, 45% PNM). He has undergone multiple paracenteses as well as abscess drainage of perihepatic fluid collections and treated with antimicrobial therapy but no positive cultures. Of note, biliary stricture also identified and ERCP performed on 2017 with sphincterotomy and biliary stent placed. Liver tests still have not normalized despite intervention, bilirubin and AP remain elevated. Other pertinent PMH current wound vac in place 2/2 wound infection, malnutrition requiring TPN for short course and ongoing hypoalbuminemia, and seizure activity while on prograf and has since been switched to cyclosporine without reoccurrence.  He presented to the ER for fever, abdominal pain, and N/V. He reports fever (103) for 1 day  prior. Overnight, he developed N/V, reports small amount of green emesis with new left sided pain. He also endorses worsening SOB with exertion. He was scheduled as an outpatient for follow up paracentesis and IR drainage of fluid collection. CXR in ER shows large pleural effusion with subsegmental atelectasis. Infectious work up initiated in ER. His ANC is 900. Broad spectrum antibiotics initiated in ED. He denies chest pain, palpitations, diarrhea, constipation, or back pain. Denies any sick contacts.    Hospital Course:  Thoracentesis (1.2L off), Paracentesis (1.6L off), and IR drainage of fluid collection 1/11, all fluids negative for infection. ID consulted. Broad spectrum antibiotics d/c'd 1/15. Chronically malnourished with poor PO intake, prealbumin 7. Alex tube placed 1/15 for tube feedings.     ERCP 1/17 with sludge and a biliary stone which was removed and stent exchanged.  Remained with n/v; therefore, TF remained on hold and TPN continued.       Interval History: Patient with continued nausea/vomiting overnight.  Remains febril. Repeat blood cx 1/17 and 1/18 NGTD. Continue Vanc/Cefepime which was restarted 1/17.  ID consulted- ordered fungal markers though likely low yield.  CT C/A/P obtained.  With large R pleural effusion and ascites- both drained today and negative for infx per cell count.  Continue TPN.    Scheduled Meds:   sodium chloride 0.9%   Intravenous Once    atovaquone  1,500 mg Per NG tube Daily    ceFEPime (MAXIPIME) IVPB  1 g Intravenous Q12H    cycloSPORINE  100 mg Per NG tube BID    docusate  100 mg Per NG tube TID    epoetin maurisio (PROCRIT) injection  10,000 Units Intravenous Every Mon, Wed, Fri    fat emulsion 20%  250 mL Intravenous Daily    glycerin 99.5%  100 mL Rectal Once    And    magnesium citrate  300 mL Rectal Once    And    sodium chloride 0.9%  500 mL Rectal Once    heparin (porcine)  5,000 Units Subcutaneous Q12H    levetiracetam oral soln  500 mg Per NG tube BID     levothyroxine  75 mcg Oral Before breakfast    pantoprazole  40 mg Intravenous Daily    polyethylene glycol  17 g Oral Daily    ursodiol  300 mg Per NG tube BID     Continuous Infusions:   TPN ADULT CENTRAL LINE CUSTOM 40 mL/hr at 01/18/18 2154    TPN ADULT CENTRAL LINE CUSTOM       PRN Meds:sodium chloride, sodium chloride 0.9%, sodium chloride 0.9%, sodium chloride 0.9%, acetaminophen, heparin (porcine), omnipaque, ondansetron, ondansetron, oxyCODONE, oxyCODONE, prochlorperazine, sodium chloride 0.9%    Review of Systems   Constitutional: Positive for activity change, appetite change, fatigue and fever. Negative for chills.   HENT: Negative.    Respiratory: Negative for cough, shortness of breath and wheezing.    Cardiovascular: Positive for leg swelling. Negative for chest pain.   Gastrointestinal: Positive for abdominal distention, abdominal pain, constipation, nausea and vomiting.   Genitourinary: Negative for difficulty urinating.        Anuric   Musculoskeletal: Negative for arthralgias, back pain and myalgias.   Skin: Positive for wound. Negative for color change and rash.   Allergic/Immunologic: Positive for immunocompromised state.   Neurological: Positive for weakness. Negative for dizziness, seizures and headaches.   Psychiatric/Behavioral: Positive for decreased concentration. Negative for confusion. The patient is not nervous/anxious.    All other systems reviewed and are negative.    Objective:     Vital Signs (Most Recent):  Temp: 99.8 °F (37.7 °C) (01/19/18 1515)  Pulse: 94 (01/19/18 1515)  Resp: 18 (01/19/18 1515)  BP: 121/77 (01/19/18 1515)  SpO2: 97 % (01/19/18 1515) Vital Signs (24h Range):  Temp:  [98.8 °F (37.1 °C)-100.5 °F (38.1 °C)] 99.8 °F (37.7 °C)  Pulse:  [] 94  Resp:  [18] 18  SpO2:  [96 %-98 %] 97 %  BP: (114-125)/(63-79) 121/77     Weight: 68 kg (149 lb 14.6 oz)  Body mass index is 24.2 kg/m².    Intake/Output - Last 3 Shifts       01/17 0700 - 01/18 0659 01/18 0700 -  01/19 0659 01/19 0700 - 01/20 0659    P.O. 180 260 60    I.V. (mL/kg) 400 (5.9)      Other 600      NG/GT  450 190    IV Piggyback  100     .2 386.9     Total Intake(mL/kg) 1673.2 (24.6) 1196.9 (17.6) 250 (3.7)    Urine (mL/kg/hr) 0 (0) 0 (0) 0 (0)    Emesis/NG output 0 (0) 0 (0)     Other 3107 (1.9)  2700 (4.6)    Stool 0 (0) 0 (0) 0 (0)    Blood 0 (0)      Total Output 3107 0 2700    Net -1433.8 +1196.9 -2450           Urine Occurrence 0 x 1 x 0 x    Stool Occurrence 0 x 0 x 0 x    Emesis Occurrence 0 x 0 x           Physical Exam   Constitutional: He is oriented to person, place, and time. He appears well-developed. No distress.   Temporal and distal extremity muscle wasting   HENT:   Head: Normocephalic and atraumatic.   Mouth/Throat: No oropharyngeal exudate.   Eyes: EOM are normal. Pupils are equal, round, and reactive to light. Scleral icterus is present.   Neck: Normal range of motion. Neck supple. No JVD present. No thyromegaly present.   Cardiovascular: Normal rate, regular rhythm, normal heart sounds and intact distal pulses.    No murmur heard.  Pulmonary/Chest: Effort normal. No respiratory distress. He has decreased breath sounds in the right middle field, the right lower field and the left lower field. He has no wheezes. He exhibits no tenderness.   Diminished to RLL   Abdominal: Soft. Bowel sounds are normal. He exhibits distension and ascites. There is tenderness (mild). There is no rebound and no guarding.   Dressing to chevron.  Wd vac removed 1/12/18   Musculoskeletal: Normal range of motion. He exhibits edema (2+ LE edema). He exhibits no tenderness.   Neurological: He is alert and oriented to person, place, and time. He has normal reflexes.   Skin: Skin is warm and dry. Capillary refill takes 2 to 3 seconds. He is not diaphoretic. No erythema.   jaundice   Psychiatric: He has a normal mood and affect. His behavior is normal. Judgment and thought content normal. His mood appears not  anxious.   Depressed mood   Nursing note and vitals reviewed.      Laboratory:  Immunosuppressants         Stop Route Frequency     cycloSPORINE (NEORAL) 100 mg/mL microemulsion solution 100 mg      -- PER NG TUBE 2 times daily        CBC:     Recent Labs  Lab 01/19/18  0530   WBC 5.55   RBC 2.46*   HGB 7.7*   HCT 22.8*      MCV 93   MCH 31.3*   MCHC 33.8     CMP:     Recent Labs  Lab 01/19/18  0530      CALCIUM 9.4   ALBUMIN 2.3*   PROT 5.3*   *   K 4.0   CO2 25      BUN 17   CREATININE 3.9*   ALKPHOS 115   ALT 5*   AST 21   BILITOT 2.1*     Coagulation:   No results for input(s): PT, INR, APTT in the last 168 hours.  Labs within the past 24 hours have been reviewed.    Diagnostic Results:  I have personally reviewed all pertinent imaging studies.    Assessment/Plan:     * Fever    - unclear source of fever, previously thought to be related to peritonitis  - blood cultures prelim NGTD. No urine cx obtained as pt anuric.   - Pt underwent thoracentesis, paracentesis, and drain placement into fluid collection 1/11/18.   - All fluids reviewed and negative for infection. Cultures NGTD.  - ID was following have since signed off. Cefepime d/c 1/15.   - Remains febrile since 1/17. Repeat blood cx NGTD. Resumed Vanc/Cefepime given fever 1/17.  - ID reconsulted --> fungal markers pending (crypto Ag, Aspergillus Ag, Fungitell Assay, Histo Ag)  - CT C/A/P with large R pleural effusion and ascites --> both drained today and negative for infection  - Will also plan for liver bx with CMV immunostain per ID recs Monday.         Liver transplanted    - post op course complicated by fevers and hyperbilirubinemia  - ERCP 12/6 with post-anastomosis stricture with stent placement  - AST/ALT normal. Tbili/alkphos remain elevated  - Liver US 1/8 showed 3.9cm complex fluid collection anterior to right lobe and moderate nonspecific complex ascites inferior to transplant.   - IR placed drain 1/11/18, cell count  negative for infection.  - PBS consulted. PBS not comfortable proceeding with EUS with liver biopsy given fever and possible infectious process going on. ERCP 1/17 with stone and sludge.  Pt placed on Actigall.        Long-term use of immunosuppressant medication    - Maintenance IS with cyclosporine. MMF on hold for infections and neutropenia. Continue to check cyclosporine level daily. Assess for toxicity and adjust level as needed.        Prophylactic immunotherapy    - See long term use of immunosuppression.         At risk for opportunistic infections    - Hold Valcyte as WBC low.  CMV PCR 1/11 undetected.  - Continue Bactrim for PCP proph  - isavu for fungal prophylaxis 2/2 complicated post-op course        Other ascites    - Paracentesis performed 1/11/18 with 1600 ml removed.    - fluid negative for infection.   - Repeat para today negative for infection per cell count        Severe protein-calorie malnutrition    - consult dietician.  - SHANE and tube feeds ordered but not tolerating  -TPN started 1/16/18.         Neutropenia    - Infectious work up negative so far, dc antibiotics 1/15.  - CMV PCR from 1/11/18 not detected. Monitor.  - Neupogen given 1/16/18 with improvement in ANC seen.         Nausea and vomiting    - h/o constipation, reports having regular BM's with bowel regimen  - vomiting episode x 1 at home. Anti-emetics ordered PRN.  - KUB repeated 1/14/18 given increased abdominal pain- mild, generalized bowel distention suggesting ileus.  Diet changed to clears as tolerated for bowel rest   - SHANE tube placed 1/16 for tube feeds. Not tolerating tube feeds, stopped 1/17/18.  - chronic constipation, Cont bowel regiment.           Pleural effusion, right    - CXR in ER with large right pleural effusion with subjective c/o worsening SOB  - thoracentesis completed- 1200 ml removed.  SOB with significant improvement.   - Cell count reviewed and negative for infection.   - resp even and non labored.  O2  sat 94-98%.  - Repeat thora today negative for infection per cell count        Protein-calorie malnutrition    - Poor PO intake since transplant requiring short course of TPN during previous hospital stay.   - albumin remains decreased but appetite slowly improving per pt.   - Dietary consulted. Will need to closely monitor PO intake.   - supplements also ordered.   - SHANE with tube feeding start 1/16. Stopped 1/17/18, was not tolerating.   - prealbumin 7 on 1/15.   - TPN started 1/16/18.         Peritonitis    - Initially diagnosed in November but has since had improved cell counts in recent para's  - IR placed drain in fluid collection and paracentesis performed 1/11/18. Both fluids reviewed and negative for infection based on cell counts.   - F/u on cultures.         Anemia of chronic disease    - PRBC x 1 today.  - Trend H&H daily.         Delayed surgical wound healing    - wd vac removed 1/12/18. Wound care following, recommend dc wound vac. Wound healing well. Aquacel AG and Mepilex border dressing applied via wound care recs.           Acute renal failure with tubular necrosis    - HD resumed on previous admission. Now anuric and dialyzes M-W-F.  - Nephrology following.    - Hold HD today for thora/para and reassess tomorrow            VTE Risk Mitigation         Ordered     heparin (porcine) injection 1,000 Units  As needed (PRN)     Route:  Intra-Catheter        01/12/18 1017     heparin (porcine) injection 5,000 Units  Every 12 hours     Route:  Subcutaneous        01/12/18 0926     Medium Risk of VTE  Once      01/11/18 0351     Place sequential compression device  Until discontinued      01/11/18 0351          The patients clinical status was discussed at multidisplinary rounds, involving transplant surgery, transplant medicine, pharmacy, nursing, nutrition, and social work    Discharge Planning:  Not a candidate for discharge.    Geronimo Vera PA-C  Liver Transplant  [facility]  [place]

## 2018-01-19 NOTE — SUBJECTIVE & OBJECTIVE
Scheduled Meds:   sodium chloride 0.9%   Intravenous Once    atovaquone  1,500 mg Per NG tube Daily    ceFEPime (MAXIPIME) IVPB  1 g Intravenous Q12H    cycloSPORINE  100 mg Per NG tube BID    docusate  100 mg Per NG tube TID    epoetin maurisio (PROCRIT) injection  10,000 Units Intravenous Every Mon, Wed, Fri    fat emulsion 20%  250 mL Intravenous Daily    glycerin 99.5%  100 mL Rectal Once    And    magnesium citrate  300 mL Rectal Once    And    sodium chloride 0.9%  500 mL Rectal Once    heparin (porcine)  5,000 Units Subcutaneous Q12H    levetiracetam oral soln  500 mg Per NG tube BID    levothyroxine  75 mcg Oral Before breakfast    pantoprazole  40 mg Intravenous Daily    polyethylene glycol  17 g Oral Daily    ursodiol  300 mg Per NG tube BID     Continuous Infusions:   TPN ADULT CENTRAL LINE CUSTOM 40 mL/hr at 01/18/18 2154    TPN ADULT CENTRAL LINE CUSTOM       PRN Meds:sodium chloride, sodium chloride 0.9%, sodium chloride 0.9%, sodium chloride 0.9%, acetaminophen, heparin (porcine), omnipaque, ondansetron, ondansetron, oxyCODONE, oxyCODONE, prochlorperazine, sodium chloride 0.9%    Review of Systems   Constitutional: Positive for activity change, appetite change, fatigue and fever. Negative for chills.   HENT: Negative.    Respiratory: Negative for cough, shortness of breath and wheezing.    Cardiovascular: Positive for leg swelling. Negative for chest pain.   Gastrointestinal: Positive for abdominal distention, abdominal pain, constipation, nausea and vomiting.   Genitourinary: Negative for difficulty urinating.        Anuric   Musculoskeletal: Negative for arthralgias, back pain and myalgias.   Skin: Positive for wound. Negative for color change and rash.   Allergic/Immunologic: Positive for immunocompromised state.   Neurological: Positive for weakness. Negative for dizziness, seizures and headaches.   Psychiatric/Behavioral: Positive for decreased concentration. Negative for confusion.  The patient is not nervous/anxious.    All other systems reviewed and are negative.    Objective:     Vital Signs (Most Recent):  Temp: 99.8 °F (37.7 °C) (01/19/18 1515)  Pulse: 94 (01/19/18 1515)  Resp: 18 (01/19/18 1515)  BP: 121/77 (01/19/18 1515)  SpO2: 97 % (01/19/18 1515) Vital Signs (24h Range):  Temp:  [98.8 °F (37.1 °C)-100.5 °F (38.1 °C)] 99.8 °F (37.7 °C)  Pulse:  [] 94  Resp:  [18] 18  SpO2:  [96 %-98 %] 97 %  BP: (114-125)/(63-79) 121/77     Weight: 68 kg (149 lb 14.6 oz)  Body mass index is 24.2 kg/m².    Intake/Output - Last 3 Shifts       01/17 0700 - 01/18 0659 01/18 0700 - 01/19 0659 01/19 0700 - 01/20 0659    P.O. 180 260 60    I.V. (mL/kg) 400 (5.9)      Other 600      NG/GT  450 190    IV Piggyback  100     .2 386.9     Total Intake(mL/kg) 1673.2 (24.6) 1196.9 (17.6) 250 (3.7)    Urine (mL/kg/hr) 0 (0) 0 (0) 0 (0)    Emesis/NG output 0 (0) 0 (0)     Other 3107 (1.9)  2700 (4.6)    Stool 0 (0) 0 (0) 0 (0)    Blood 0 (0)      Total Output 3107 0 2700    Net -1433.8 +1196.9 -2450           Urine Occurrence 0 x 1 x 0 x    Stool Occurrence 0 x 0 x 0 x    Emesis Occurrence 0 x 0 x           Physical Exam   Constitutional: He is oriented to person, place, and time. He appears well-developed. No distress.   Temporal and distal extremity muscle wasting   HENT:   Head: Normocephalic and atraumatic.   Mouth/Throat: No oropharyngeal exudate.   Eyes: EOM are normal. Pupils are equal, round, and reactive to light. Scleral icterus is present.   Neck: Normal range of motion. Neck supple. No JVD present. No thyromegaly present.   Cardiovascular: Normal rate, regular rhythm, normal heart sounds and intact distal pulses.    No murmur heard.  Pulmonary/Chest: Effort normal. No respiratory distress. He has decreased breath sounds in the right middle field, the right lower field and the left lower field. He has no wheezes. He exhibits no tenderness.   Diminished to RLL   Abdominal: Soft. Bowel sounds are  normal. He exhibits distension and ascites. There is tenderness (mild). There is no rebound and no guarding.   Dressing to chevron.  Wd vac removed 1/12/18   Musculoskeletal: Normal range of motion. He exhibits edema (2+ LE edema). He exhibits no tenderness.   Neurological: He is alert and oriented to person, place, and time. He has normal reflexes.   Skin: Skin is warm and dry. Capillary refill takes 2 to 3 seconds. He is not diaphoretic. No erythema.   jaundice   Psychiatric: He has a normal mood and affect. His behavior is normal. Judgment and thought content normal. His mood appears not anxious.   Depressed mood   Nursing note and vitals reviewed.      Laboratory:  Immunosuppressants         Stop Route Frequency     cycloSPORINE (NEORAL) 100 mg/mL microemulsion solution 100 mg      -- PER NG TUBE 2 times daily        CBC:     Recent Labs  Lab 01/19/18  0530   WBC 5.55   RBC 2.46*   HGB 7.7*   HCT 22.8*      MCV 93   MCH 31.3*   MCHC 33.8     CMP:     Recent Labs  Lab 01/19/18  0530      CALCIUM 9.4   ALBUMIN 2.3*   PROT 5.3*   *   K 4.0   CO2 25      BUN 17   CREATININE 3.9*   ALKPHOS 115   ALT 5*   AST 21   BILITOT 2.1*     Coagulation:   No results for input(s): PT, INR, APTT in the last 168 hours.  Labs within the past 24 hours have been reviewed.    Diagnostic Results:  I have personally reviewed all pertinent imaging studies.

## 2018-01-19 NOTE — H&P
Inpatient Radiology Pre-procedure Note    History of Present Illness:  Jhonny Diana is a 27 y.o. male with liver transplant on 10/19/17 and recurrent pleural effusion and ascites who presents for paracentesis and R thoracentesis.    Admission H&P reviewed.  Past Medical History:   Diagnosis Date    Alcoholic hepatitis with ascites     Alcoholic hepatitis with ascites     History of hematemesis 9/28/2017    Hypertension     Renal disorder      Past Surgical History:   Procedure Laterality Date    APPENDECTOMY      ERCP      ESOPHAGOGASTRODUODENOSCOPY      LIVER TRANSPLANT         Review of Systems:   As documented in primary team H&P    Home Meds:   Prior to Admission medications    Medication Sig Start Date End Date Taking? Authorizing Provider   aspirin (ECOTRIN) 81 MG EC tablet Take 1 tablet (81 mg total) by mouth once daily. 11/8/17   Nathanael Medina MD   atovaquone (MEPRON) 750 mg/5 mL Susp Take 10 mLs (1,500 mg total) by mouth once daily. Stop on 4/17/18 10/19/17 4/17/18  Nathanael Medina MD   cycloSPORINE modified, NEORAL, 25 MG capsule Take 3 capsules (75 mg total) by mouth 2 (two) times daily. 1/9/18   Quiana Carter MD   docusate sodium (COLACE) 100 MG capsule Take 100 mg by mouth 3 (three) times daily as needed for Constipation.    Historical Provider, MD   ergocalciferol (ERGOCALCIFEROL) 50,000 unit Cap Take 1 capsule (50,000 Units total) by mouth every 7 days. 11/7/17   Nathanael Medina MD   levETIRAcetam (KEPPRA) 500 MG Tab Take 1 tablet (500 mg total) by mouth 2 (two) times daily. 11/7/17 11/7/18  Nathanael Medina MD   levothyroxine (SYNTHROID) 75 MCG tablet Take 1 tablet (75 mcg total) by mouth before breakfast. 12/21/17   Nathanael Medina MD   mirtazapine (REMERON) 7.5 MG Tab Take 1 tablet (7.5 mg total) by mouth every evening. 12/20/17   Nathanael Medina MD   multivitamin (THERAGRAN) tablet Take 1 tablet by mouth once daily. 11/7/17   Nathanael Medina MD   ondansetron (ZOFRAN-ODT) 8 MG TbDL Take 1 tablet (8 mg  total) by mouth every 8 (eight) hours as needed (nausea). 1/10/18   Quiana Carter MD   oxyCODONE (ROXICODONE) 10 mg Tab immediate release tablet Take 0.5-1 tablets (5-10 mg total) by mouth every 6 (six) hours as needed for Pain. 1/10/18   Quiana Carter MD   pantoprazole (PROTONIX) 40 MG tablet Take 1 tablet (40 mg total) by mouth once daily. 11/8/17   Nathanael Medina MD   sodium bicarbonate 650 MG tablet Take 2 tablets (1,300 mg total) by mouth 2 (two) times daily. 11/30/17   Geronimo Vera PA-C   thiamine 100 MG tablet Take 1 tablet (100 mg total) by mouth once daily. 11/7/17   Nathanael Medina MD   ursodiol (ACTIGALL) 300 mg capsule Take 1 capsule (300 mg total) by mouth 2 (two) times daily. 12/20/17   Nathanael Medina MD     Scheduled Meds:    sodium chloride 0.9%   Intravenous Once    atovaquone  1,500 mg Per NG tube Daily    ceFEPime (MAXIPIME) IVPB  1 g Intravenous Q12H    cycloSPORINE  100 mg Per NG tube BID    docusate  100 mg Per NG tube TID    epoetin maurisio (PROCRIT) injection  10,000 Units Intravenous Every Mon, Wed, Fri    fat emulsion 20%  250 mL Intravenous Daily    glycerin 99.5%  100 mL Rectal Once    And    magnesium citrate  300 mL Rectal Once    And    sodium chloride 0.9%  500 mL Rectal Once    heparin (porcine)  5,000 Units Subcutaneous Q12H    levetiracetam oral soln  500 mg Per NG tube BID    levothyroxine  75 mcg Oral Before breakfast    pantoprazole  40 mg Intravenous Daily    polyethylene glycol  17 g Oral Daily    ursodiol  300 mg Per NG tube BID     Continuous Infusions:    TPN ADULT CENTRAL LINE CUSTOM 40 mL/hr at 01/18/18 2964    TPN ADULT CENTRAL LINE CUSTOM       PRN Meds:sodium chloride, sodium chloride 0.9%, sodium chloride 0.9%, sodium chloride 0.9%, acetaminophen, heparin (porcine), omnipaque, ondansetron, ondansetron, oxyCODONE, oxyCODONE, prochlorperazine, sodium chloride 0.9%  Anticoagulants/Antiplatelets: Heparin- last dose 1/18/19    Allergies:   Review of  patient's allergies indicates:   Allergen Reactions    Bactrim [sulfamethoxazole-trimethoprim] Other (See Comments)     Mookie Trell Syndrome     Sedation Hx: have not been any systemic reactions    Labs:  No results for input(s): INR in the last 168 hours.    Invalid input(s):  PT,  PTT    Recent Labs  Lab 01/19/18 0530   WBC 5.55   HGB 7.7*   HCT 22.8*   MCV 93         Recent Labs  Lab 01/19/18 0530      *   K 4.0      CO2 25   BUN 17   CREATININE 3.9*   CALCIUM 9.4   MG 2.5   ALT 5*   AST 21   ALBUMIN 2.3*   BILITOT 2.1*         Vitals:  Temp: 99.3 °F (37.4 °C) (01/19/18 0728)  Pulse: 96 (01/19/18 0728)  Resp: 18 (01/19/18 0728)  BP: 120/74 (01/19/18 0728)  SpO2: 96 % (01/19/18 0728)     Physical Exam:  ASA: 3  Mallampati: 2    General: no acute distress  Mental Status: alert and oriented to person, place and time  HEENT: normocephalic, atraumatic  Chest: unlabored breathing  Heart: regular heart rate  Abdomen: distended  Extremity: moves all extremities    Plan: R thoracentsis and paracentesis  Sedation Plan: local    Brandon Long MD  Radiology PGY-2  [phone]

## 2018-01-19 NOTE — ASSESSMENT & PLAN NOTE
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, CMV D+/R+, steroid induction, on maintenance tacro/MMF/pred; c/b seizures, LAURA, superificial wound infection s/p wound vac to [facility] through 1/12, and several recent admissions on 11/24 and 12/3 with culture negative peritonitis with peak WBC ~5k due to suspected indolent biliary leakage s/p sphincterotomy/biliary stent placement over CBD stricture on 12/6 ERCP although notably with no overt leak noted during procedure) who was admitted on 1/11/2017 with acute onset fevers and acute on chronic N/V/abdominal pain (RLQ worst) in the setting of persistent noninflammatory ascites of unknown etiology. He underwent drainage of pleural fluid and ascites that did not show evidence of gross infection and his fevers initially abated on empiric cefepime that was stopped due to a lack of bacterial process found during evaluation. His fevers and symptoms have recurred off antibiotics and ERCP failed to reveal significant biliary pathology as the cause of his FUO. He remains tenuous.    - would continue empiric vanc/cefepime for now in addition to prophylaxis  - await pending pleural/peritoneal fluid cultures  - would obtain liver biopsy with CMV immunostaining  - have added on fungal markers given FUO (although I do not think he has pneumonia; more likely that his R lung findings are due to compressive atelectasis from his pleural effusion)

## 2018-01-19 NOTE — PROCEDURES
Radiology Post-Procedure Note    Pre Op Diagnosis: Ascites  Post Op Diagnosis: Same    Procedure: Paracentesis     Procedure performed by: Brandon Long MD; Devante Marroquin MD    Written Informed Consent Obtained: Yes  Specimen Removed: YES clear yellow fluid  Estimated Blood Loss: Minimal    Findings:   Successful paracentesis.  Albumin administered PRN per protocol.    Patient tolerated procedure well.    Brnadon Long MD  Radiology PGY-2  [phone]

## 2018-01-19 NOTE — ASSESSMENT & PLAN NOTE
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+, steroid induction, on maintenance tacro/MMF/pred; c/b seizures, LAURA, superificial wound infection s/p wound vac to [facility] through 1/12, and several recent admissions on 11/24 and 12/3 with culture negative peritonitis with peak WBC ~5k due to suspected indolent biliary leakage s/p sphincterotomy/biliary stent placement over CBD stricture on 12/6 ERCP although notably with no overt leak noted during procedure) who was admitted on 1/11/2017 with acute onset fevers and acute on chronic N/V/abdominal pain (RLQ worst) in the setting of persistent noninflammatory ascites of unknown etiology. He underwent drainage of pleural fluid and ascites that did not show evidence of gross infection and his fevers initially abated on empiric cefepime that was stopped due to a lack of bacterial process found during evaluation. His fevers and symptoms have recurred off antibiotics and ERCP failed to reveal significant biliary pathology as the cause of his FUO. He remains tenuous.    - would continue empiric vanc/cefepime for now in addition to prophylaxis  - would obtain repeat imaging of abdomen and sample fluid  - would obtain liver biopsy with CMV immunostaining

## 2018-01-19 NOTE — SUBJECTIVE & OBJECTIVE
Interval History: Called back as N/V/abdominal pain and bloating persist and fevers recurred yesterday off antibiotics. Restarted on vanc/cefepime after spiking through unasyn prior to ERCP (no significant stricture or leak noted during stent change; nonobstructing stone swept from duct).     Review of Systems   Constitutional: Negative for fever.   Cardiovascular: Positive for leg swelling.   Gastrointestinal: Positive for abdominal pain. Negative for diarrhea, nausea and vomiting.   Genitourinary:        Anuric     Allergic/Immunologic: Positive for immunocompromised state.     Objective:     Vital Signs (Most Recent):  Temp: 100 °F (37.8 °C) (01/18/18 1806)  Pulse: (!) 114 (01/18/18 1535)  Resp: 18 (01/18/18 1535)  BP: 116/63 (01/18/18 1535)  SpO2: 96 % (01/18/18 1535) Vital Signs (24h Range):  Temp:  [98.6 °F (37 °C)-100.6 °F (38.1 °C)] 100 °F (37.8 °C)  Pulse:  [] 114  Resp:  [16-18] 18  SpO2:  [96 %-97 %] 96 %  BP: (105-127)/(56-76) 116/63     Weight: 68 kg (149 lb 14.6 oz)  Body mass index is 24.2 kg/m².    Estimated Creatinine Clearance: 34.2 mL/min (based on SCr of 2.9 mg/dL (H)).    Physical Exam   Constitutional: He is oriented to person, place, and time. He appears well-developed. No distress.   Temporal and distal extremity muscle wasting   HENT:   Head: Normocephalic and atraumatic.   Mouth/Throat: No oropharyngeal exudate.   Eyes: EOM are normal. Pupils are equal, round, and reactive to light. Scleral icterus is present.   Neck: Normal range of motion. Neck supple. No JVD present. No thyromegaly present.   Cardiovascular: Normal rate, regular rhythm, normal heart sounds and intact distal pulses.    No murmur heard.  Pulmonary/Chest: Effort normal. No respiratory distress. He has decreased breath sounds in the right middle field, the right lower field and the left lower field. He has no wheezes. He exhibits no tenderness.   Diminished to RLL   Abdominal: Soft. Bowel sounds are normal. He exhibits  distension and ascites. There is tenderness (mild). There is no rebound and no guarding.   Wound vac removed.   Musculoskeletal: Normal range of motion. He exhibits edema (2+ LE edema). He exhibits no tenderness.   Neurological: He is alert and oriented to person, place, and time. He has normal reflexes.   Skin: Skin is warm and dry. Capillary refill takes 2 to 3 seconds. He is not diaphoretic. No erythema.   jaundice   Psychiatric: He has a normal mood and affect. His behavior is normal. Judgment and thought content normal. His mood appears not anxious.   Nursing note and vitals reviewed.      Significant Labs:   CBC:     Recent Labs  Lab 01/17/18  0559 01/18/18  0403   WBC 5.33 5.31   HGB 8.1* 7.7*   HCT 24.0* 23.0*    188     CMP:     Recent Labs  Lab 01/17/18  0559 01/18/18  0403   * 137   K 3.5 3.2*    104   CO2 23 23   * 96   BUN 15 9   CREATININE 3.8* 2.9*   CALCIUM 9.6 8.9   PROT 5.2* 5.3*   ALBUMIN 2.4* 2.5*   BILITOT 2.0* 2.0*   ALKPHOS 108 108   AST 16 20   ALT <5* <5*   ANIONGAP 9 10   EGFRNONAA 20.3* 28.1*       Significant Imaging: I have reviewed all pertinent imaging results/findings within the past 24 hours.     CT CAP: pending    Microbiology:  1/11 blood cx: negative  1/11 pleural fluid cx: negative  1/11 ascites cx: negative  1/17 blood cx: negative

## 2018-01-19 NOTE — SUBJECTIVE & OBJECTIVE
Interval History: No change from last night -- having low-grade temps. CT CAP without new findings - reaccumulation of ascites and pleural fluid. Awaiting drainage of pleural/peritoneal fluid and liver biopsy.    Review of Systems   Constitutional: Negative for fever.   Cardiovascular: Positive for leg swelling.   Gastrointestinal: Positive for abdominal pain. Negative for diarrhea, nausea and vomiting.   Genitourinary:        Anuric     Allergic/Immunologic: Positive for immunocompromised state.     Objective:     Vital Signs (Most Recent):  Temp: 99 °F (37.2 °C) (01/19/18 1316)  Pulse: 93 (01/19/18 1316)  Resp: 18 (01/19/18 1316)  BP: 125/77 (01/19/18 1316)  SpO2: 98 % (01/19/18 1316) Vital Signs (24h Range):  Temp:  [98.8 °F (37.1 °C)-100.5 °F (38.1 °C)] 99 °F (37.2 °C)  Pulse:  [] 93  Resp:  [18] 18  SpO2:  [96 %-98 %] 98 %  BP: (114-125)/(63-79) 125/77     Weight: 68 kg (149 lb 14.6 oz)  Body mass index is 24.2 kg/m².    Estimated Creatinine Clearance: 25.4 mL/min (based on SCr of 3.9 mg/dL (H)).    Physical Exam   Constitutional: He is oriented to person, place, and time. He appears well-developed. No distress.   Temporal and distal extremity muscle wasting   HENT:   Head: Normocephalic and atraumatic.   Mouth/Throat: No oropharyngeal exudate.   Eyes: EOM are normal. Pupils are equal, round, and reactive to light. No scleral icterus.   Neck: Normal range of motion. Neck supple. No JVD present. No thyromegaly present.   Cardiovascular: Normal rate, regular rhythm, normal heart sounds and intact distal pulses.    No murmur heard.  Pulmonary/Chest: Effort normal. No respiratory distress. He has decreased breath sounds in the right middle field, the right lower field and the left lower field. He has no wheezes. He exhibits no tenderness.   Diminished to RLL   Abdominal: Soft. Bowel sounds are normal. He exhibits distension and ascites. There is tenderness (mild). There is no rebound and no guarding.   Wound  vac removed.   Musculoskeletal: Normal range of motion. He exhibits edema (2+ LE edema). He exhibits no tenderness.   Neurological: He is alert and oriented to person, place, and time. He has normal reflexes.   Skin: Skin is warm and dry. Capillary refill takes 2 to 3 seconds. He is not diaphoretic. No erythema.   Psychiatric: He has a normal mood and affect. His behavior is normal. Judgment and thought content normal. His mood appears not anxious.   Nursing note and vitals reviewed.      Significant Labs:   CBC:     Recent Labs  Lab 01/18/18  0403 01/19/18  0530   WBC 5.31 5.55   HGB 7.7* 7.7*   HCT 23.0* 22.8*    193     CMP:     Recent Labs  Lab 01/18/18  0403 01/19/18  0530    135*   K 3.2* 4.0    102   CO2 23 25   GLU 96 100   BUN 9 17   CREATININE 2.9* 3.9*   CALCIUM 8.9 9.4   PROT 5.3* 5.3*   ALBUMIN 2.5* 2.3*   BILITOT 2.0* 2.1*   ALKPHOS 108 115   AST 20 21   ALT <5* 5*   ANIONGAP 10 8   EGFRNONAA 28.1* 19.7*       Significant Imaging: I have reviewed all pertinent imaging results/findings within the past 24 hours.     CT CAP:   Multifocal patchy areas of consolidation in the left lung with a more confluent consolidation or atelectasis in the right lung.   Large right and small left pleural effusions which create local compressive atelectasis.  Simple collection about the inferior margin of the liver similar to the prior ultrasound.  Other incidental findings including biliary stent placement, pneumobilia, cardiomegaly, right and left sided central venous catheters, and body wall anasarca.    Microbiology:  1/11 blood cx: negative  1/11 pleural fluid cx: negative  1/11 ascites cx: negative  1/17 blood cx: negative  1/18 blood cx: negative  1/19 pleural fluid cx: pending  1/19 ascites cx: pending

## 2018-01-19 NOTE — PROGRESS NOTES
Patient is off of the unit in Interventional Radiology   Will try and reschedule to return and see his wound this afternoon when he returns.  Zoë Murphy RN CWON  [phone]

## 2018-01-19 NOTE — ASSESSMENT & PLAN NOTE
- CXR in ER with large right pleural effusion with subjective c/o worsening SOB  - thoracentesis completed- 1200 ml removed.  SOB with significant improvement.   - Cell count reviewed and negative for infection.   - resp even and non labored.  O2 sat 94-98%.  - Repeat thora today negative for infection per cell count

## 2018-01-19 NOTE — PROCEDURES
Radiology Post-Procedure Note    Pre Op Diagnosis: right pleural effusion  Post Op Diagnosis: Same    Procedure: right thoracentesis    Procedure performed by: Brandon Long MD; Jhonny Blackburn MD    Written Informed Consent Obtained: Yes  Specimen Removed: YES  clear carlos fluid, refer to imaging report for total volume out  Estimated Blood Loss: Minimal    Findings:   Successful right thoracentesis, see imaging report for details.    Patient tolerated procedure well.    Jhonny Blackburn MD  Department of Radiology  Pager: [phone]

## 2018-01-19 NOTE — ASSESSMENT & PLAN NOTE
- unclear source of fever, previously thought to be related to peritonitis  - blood cultures prelim NGTD. No urine cx obtained as pt anuric.   - Pt underwent thoracentesis, paracentesis, and drain placement into fluid collection 1/11/18.   - All fluids reviewed and negative for infection. Cultures NGTD.  - ID was following have since signed off. Cefepime d/c 1/15.   - Remains febrile since 1/17. Repeat blood cx NGTD. Resumed Vanc/Cefepime given fever 1/17.  - ID reconsulted --> fungal markers pending (crypto Ag, Aspergillus Ag, Fungitell Assay, Histo Ag)  - CT C/A/P with large R pleural effusion and ascites --> both drained today and negative for infection  - Will also plan for liver bx with CMV immunostain per ID recs Monday.

## 2018-01-19 NOTE — PROGRESS NOTES
[facility]  Adult Nutrition  Progress Note    SUMMARY     Recommendations    Recommendation/Intervention: Recommend continuing TPN and starting Novasource at rate of 15mL/hr x24hrs to provide additional calories and protein, and to continue to utilize gut. Continue to endorse PEG/PEJ tube if medically able.     Current TPN provision of 70g AA / 225g dex plus IV lipids daily does not meet patient's EEN and EPN. Novasource at 15mL/hr provides 720kcal and 33g protein. TPN and EN combined these will provide 102 g protein, and 2265 kcal. RD following and tracking PO intake.     Goals: Consume/tolerate > 85% EEN and EPN  Nutrition Goal Status: progressing towards goal  Communication of RD Recs: reviewed with physician    Reason for Assessment    Reason for Assessment: RD follow-up  Diagnosis: transplant/postoperative complications  Relevent Medical History: OLTx 10/19/2017, complicate post-op w/ LAURA ongoing, chronic malnutrition, cirrhosis 2' EtOH   Interdisciplinary Rounds: attended     General Information Comments: Now with TPN running at goal rate, +NG but no TF running, abdomen tapped today, pt sleeping at visit with mom at bedside, 0% PO intake     Nutrition Discharge Planning: Adequate nutrition PO vs EN    Nutrition Prescription Ordered    Current Diet Order: regular     Current Nutrition Support Formula Ordered: Novasource Renal (custom TPN 70g AA / 225g dex)  Current Nutrition Support Rate Ordered: 40 (ml)  Current Nutrition Support Frequency Ordered: mL/hr      Evaluation of Received Nutrients/Fluid Intake    Enteral Calories (kcal): 1440  Enteral Protein (gm): 65  Enteral (Free Water) Fluid (mL): 516          Energy Calories Required: not meeting needs  % Kcal Needs: 76         Protein Required: not meeting needs  % Protein Needs: 74    I/O: -3.8L since admit       Fluid Required:  (per MD)  Comments: LBM 1/15  Tolerance: not tolerating  % Intake of Estimated Energy Needs: 75 - 100 %  % Meal  "Intake: 0%     Nutrition Risk Screen     Nutrition Risk Screen: large or nonhealing wound, burn or pressure ulcer    Nutrition/Diet History    Patient Reported Diet/Restrictions/Preferences: low salt  Typical Food/Fluid Intake: eats very little at this time  Food Preferences: No cultural or Anabaptist food preferences noted  Meal/Snack Patterns: pt likes to cook, when feeling well will cook and then only eat bites  Supplemental Drinks or Food Habits:  (protein powders sometimes)  Factors Affecting Nutritional Intake: abdominal distention, altered gastrointestinal function, decreased appetite     Labs/Tests/Procedures/Meds     Pertinent Labs Reviewed: reviewed, pertinent  Pertinent Labs Comments: Na 135, Cr 3.9, GFR 19.7, TBIli 2.1  Pertinent Medications Reviewed: reviewed, pertinent  Pertinent Medications Comments: docusate, epoetin, heparin, pantoprazole, KCl    Physical Findings    Overall Physical Appearance: generalized wasting, loss of muscle mass, loss of subcutaneous fat, weak  Tubes: nasogastric tube  Oral/Mouth Cavity: WDL  Skin: intact    Anthropometrics    Temp: 99.8 °F (37.7 °C)     Height: 5' 6" (167.6 cm)  Weight Method: Bed Scale  Weight: 68 kg (149 lb 14.6 oz)  Ideal Body Weight (IBW), Male: 142 lb     % Ideal Body Weight, Male (lb): 112.68 lb     BMI (Calculated): 25.9  BMI Grade: 25 - 29.9 - overweight  Weight Loss: unintentional  Usual Body Weight (UBW), k kg (2017 )     % Usual Body Weight: 86.71  % Weight Change From Usual Weight: -13.47 %     Estimated/Assessed Needs    Weight Used For Calorie Calculations: 62.3 kg (137 lb 5.6 oz)   Height (cm): 167.6 cm  Energy Calorie Requirements (kcal): 3904-9153 (30-35 kcal/kg)  Energy Need Method: Kcal/kg      RMR ([place]-St. Jeor Equation): 1535.75      Weight Used For Protein Calculations: 62.3 kg (137 lb 5.6 oz)  Protein Requirements: 81-94g (1.3-1.5 g/kg)    Fluid Requirements (mL): 1mL/kcal  Fluid Need Method: RDA Method      RDA Method " (mL): 1869           Assessment and Plan      Nutrition Diagnosis  Inadequate energy intake     Related to (etiology):   Decreased appetite     Signs and Symptoms (as evidenced by):   Pt meeting ~25% of needs at most, hollowing temples, protruding clavicle, visible scapula      Interventions/Recommendations (treatment strategy):  See recs     Nutrition Diagnosis Status:   Continues         Monitor and Evaluation    Food and Nutrient Intake: energy intake, food and beverage intake  Food and Nutrient Adminstration: diet order  Knowledge/Beliefs/Attitudes: food and nutrition knowledge/skill  Physical Activity and Function: nutrition-related ADLs and IADLs  Anthropometric Measurements: weight, weight change, body mass index  Biochemical Data, Medical Tests and Procedures: electrolyte and renal panel, lipid profile, gastrointestinal profile, glucose/endocrine profile, inflammatory profile  Nutrition-Focused Physical Findings: overall appearance    Nutrition Risk    Level of Risk:  (2x/week)    Nutrition Follow-Up    RD Follow-up?: Yes

## 2018-01-19 NOTE — PROGRESS NOTES
[facility]  Infectious Disease  Progress Note    Patient Name: Jhonny Diana  MRN: [id number]  Admission Date: 1/11/2018  Length of Stay: 7 days  Attending Physician: Nathanael Medina MD  Primary Care Provider: Primary Doctor No    Isolation Status: No active isolations  Assessment/Plan:      * Fever    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, CMV D+/R+, steroid induction, on maintenance tacro/MMF/pred; c/b seizures, LAURA, superificial wound infection s/p wound vac to [facility] through 1/12, and several recent admissions on 11/24 and 12/3 with culture negative peritonitis with peak WBC ~5k due to suspected indolent biliary leakage s/p sphincterotomy/biliary stent placement over CBD stricture on 12/6 ERCP although notably with no overt leak noted during procedure) who was admitted on 1/11/2017 with acute onset fevers and acute on chronic N/V/abdominal pain (RLQ worst) in the setting of persistent noninflammatory ascites of unknown etiology. He underwent drainage of pleural fluid and ascites that did not show evidence of gross infection and his fevers initially abated on empiric cefepime that was stopped due to a lack of bacterial process found during evaluation. His fevers and symptoms have recurred off antibiotics and ERCP failed to reveal significant biliary pathology as the cause of his FUO. He remains tenuous.    - would continue empiric vanc/cefepime for now in addition to prophylaxis  - would obtain repeat imaging of abdomen and sample fluid  - would obtain liver biopsy with CMV immunostaining            Anticipated Disposition: pending improvement    Thank you for your consult. I will follow-up with patient. Please contact us if you have any additional questions.     Melanie Bergman MD  Transplant ID Attending  [id number]    Melanie Bergman MD  Infectious Disease  [facility]    Subjective:     Principal  Problem:Fever    HPI: No notes on file  Interval History: Called back as N/V/abdominal pain and bloating persist and fevers recurred yesterday off antibiotics. Restarted on vanc/cefepime after spiking through unasyn prior to ERCP (no significant stricture or leak noted during stent change; nonobstructing stone swept from duct).     Review of Systems   Constitutional: Negative for fever.   Cardiovascular: Positive for leg swelling.   Gastrointestinal: Positive for abdominal pain. Negative for diarrhea, nausea and vomiting.   Genitourinary:        Anuric     Allergic/Immunologic: Positive for immunocompromised state.     Objective:     Vital Signs (Most Recent):  Temp: 100 °F (37.8 °C) (01/18/18 1806)  Pulse: (!) 114 (01/18/18 1535)  Resp: 18 (01/18/18 1535)  BP: 116/63 (01/18/18 1535)  SpO2: 96 % (01/18/18 1535) Vital Signs (24h Range):  Temp:  [98.6 °F (37 °C)-100.6 °F (38.1 °C)] 100 °F (37.8 °C)  Pulse:  [] 114  Resp:  [16-18] 18  SpO2:  [96 %-97 %] 96 %  BP: (105-127)/(56-76) 116/63     Weight: 68 kg (149 lb 14.6 oz)  Body mass index is 24.2 kg/m².    Estimated Creatinine Clearance: 34.2 mL/min (based on SCr of 2.9 mg/dL (H)).    Physical Exam   Constitutional: He is oriented to person, place, and time. He appears well-developed. No distress.   Temporal and distal extremity muscle wasting   HENT:   Head: Normocephalic and atraumatic.   Mouth/Throat: No oropharyngeal exudate.   Eyes: EOM are normal. Pupils are equal, round, and reactive to light. Scleral icterus is present.   Neck: Normal range of motion. Neck supple. No JVD present. No thyromegaly present.   Cardiovascular: Normal rate, regular rhythm, normal heart sounds and intact distal pulses.    No murmur heard.  Pulmonary/Chest: Effort normal. No respiratory distress. He has decreased breath sounds in the right middle field, the right lower field and the left lower field. He has no wheezes. He exhibits no tenderness.   Diminished to RLL   Abdominal:  Soft. Bowel sounds are normal. He exhibits distension and ascites. There is tenderness (mild). There is no rebound and no guarding.   Wound vac removed.   Musculoskeletal: Normal range of motion. He exhibits edema (2+ LE edema). He exhibits no tenderness.   Neurological: He is alert and oriented to person, place, and time. He has normal reflexes.   Skin: Skin is warm and dry. Capillary refill takes 2 to 3 seconds. He is not diaphoretic. No erythema.   jaundice   Psychiatric: He has a normal mood and affect. His behavior is normal. Judgment and thought content normal. His mood appears not anxious.   Nursing note and vitals reviewed.      Significant Labs:   CBC:     Recent Labs  Lab 01/17/18  0559 01/18/18  0403   WBC 5.33 5.31   HGB 8.1* 7.7*   HCT 24.0* 23.0*    188     CMP:     Recent Labs  Lab 01/17/18  0559 01/18/18  0403   * 137   K 3.5 3.2*    104   CO2 23 23   * 96   BUN 15 9   CREATININE 3.8* 2.9*   CALCIUM 9.6 8.9   PROT 5.2* 5.3*   ALBUMIN 2.4* 2.5*   BILITOT 2.0* 2.0*   ALKPHOS 108 108   AST 16 20   ALT <5* <5*   ANIONGAP 9 10   EGFRNONAA 20.3* 28.1*       Significant Imaging: I have reviewed all pertinent imaging results/findings within the past 24 hours.     CT CAP: pending    Microbiology:  1/11 blood cx: negative  1/11 pleural fluid cx: negative  1/11 ascites cx: negative  1/17 blood cx: negative

## 2018-01-19 NOTE — ASSESSMENT & PLAN NOTE
- HD resumed on previous admission. Now anuric and dialyzes M-W-F.  - Nephrology following.    - Hold HD today for thora/para and reassess tomorrow

## 2018-01-19 NOTE — ASSESSMENT & PLAN NOTE
- Paracentesis performed 1/11/18 with 1600 ml removed.    - fluid negative for infection.   - Repeat para today negative for infection per cell count

## 2018-01-19 NOTE — PROGRESS NOTES
[facility]  Infectious Disease  Progress Note    Patient Name: Jhonny Diana  MRN: [id number]  Admission Date: 1/11/2018  Length of Stay: 8 days  Attending Physician: Nathanael Medina MD  Primary Care Provider: Primary Doctor No    Isolation Status: No active isolations  Assessment/Plan:      * Fever    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+, steroid induction, on maintenance tacro/MMF/pred; c/b seizures, LAURA, superificial wound infection s/p wound vac to [facility] through 1/12, and several recent admissions on 11/24 and 12/3 with culture negative peritonitis with peak WBC ~5k due to suspected indolent biliary leakage s/p sphincterotomy/biliary stent placement over CBD stricture on 12/6 ERCP although notably with no overt leak noted during procedure) who was admitted on 1/11/2017 with acute onset fevers and acute on chronic N/V/abdominal pain (RLQ worst) in the setting of persistent noninflammatory ascites of unknown etiology. He underwent drainage of pleural fluid and ascites that did not show evidence of gross infection and his fevers initially abated on empiric cefepime that was stopped due to a lack of bacterial process found during evaluation. His fevers and symptoms have recurred off antibiotics and ERCP failed to reveal significant biliary pathology as the cause of his FUO. He remains tenuous.    - would continue empiric vanc/cefepime for now in addition to prophylaxis  - await pending pleural/peritoneal fluid cultures  - would obtain liver biopsy with CMV immunostaining  - have added on fungal markers given FUO (although I do not think he has pneumonia; more likely that his R lung findings are due to compressive atelectasis from his pleural effusion)            Anticipated Disposition: pending improvement    Thank you for your consult. I will follow-up with patient. Please contact us if you have any additional  questions.     Melanie Bergman MD  Transplant ID Attending  [phone]    Melanie Bergman MD  Infectious Disease  [facility]    Subjective:     Principal Problem:Fever    HPI: No notes on file  Interval History: No change from last night -- having low-grade temps. CT CAP without new findings - reaccumulation of ascites and pleural fluid. Awaiting drainage of pleural/peritoneal fluid and liver biopsy.    Review of Systems   Constitutional: Negative for fever.   Cardiovascular: Positive for leg swelling.   Gastrointestinal: Positive for abdominal pain. Negative for diarrhea, nausea and vomiting.   Genitourinary:        Anuric     Allergic/Immunologic: Positive for immunocompromised state.     Objective:     Vital Signs (Most Recent):  Temp: 99 °F (37.2 °C) (01/19/18 1316)  Pulse: 93 (01/19/18 1316)  Resp: 18 (01/19/18 1316)  BP: 125/77 (01/19/18 1316)  SpO2: 98 % (01/19/18 1316) Vital Signs (24h Range):  Temp:  [98.8 °F (37.1 °C)-100.5 °F (38.1 °C)] 99 °F (37.2 °C)  Pulse:  [] 93  Resp:  [18] 18  SpO2:  [96 %-98 %] 98 %  BP: (114-125)/(63-79) 125/77     Weight: 68 kg (149 lb 14.6 oz)  Body mass index is 24.2 kg/m².    Estimated Creatinine Clearance: 25.4 mL/min (based on SCr of 3.9 mg/dL (H)).    Physical Exam   Constitutional: He is oriented to person, place, and time. He appears well-developed. No distress.   Temporal and distal extremity muscle wasting   HENT:   Head: Normocephalic and atraumatic.   Mouth/Throat: No oropharyngeal exudate.   Eyes: EOM are normal. Pupils are equal, round, and reactive to light. No scleral icterus.   Neck: Normal range of motion. Neck supple. No JVD present. No thyromegaly present.   Cardiovascular: Normal rate, regular rhythm, normal heart sounds and intact distal pulses.    No murmur heard.  Pulmonary/Chest: Effort normal. No respiratory distress. He has decreased breath sounds in the right middle field, the right lower field and the left lower field. He  has no wheezes. He exhibits no tenderness.   Diminished to RLL   Abdominal: Soft. Bowel sounds are normal. He exhibits distension and ascites. There is tenderness (mild). There is no rebound and no guarding.   Wound vac removed.   Musculoskeletal: Normal range of motion. He exhibits edema (2+ LE edema). He exhibits no tenderness.   Neurological: He is alert and oriented to person, place, and time. He has normal reflexes.   Skin: Skin is warm and dry. Capillary refill takes 2 to 3 seconds. He is not diaphoretic. No erythema.   Psychiatric: He has a normal mood and affect. His behavior is normal. Judgment and thought content normal. His mood appears not anxious.   Nursing note and vitals reviewed.      Significant Labs:   CBC:     Recent Labs  Lab 01/18/18  0403 01/19/18  0530   WBC 5.31 5.55   HGB 7.7* 7.7*   HCT 23.0* 22.8*    193     CMP:     Recent Labs  Lab 01/18/18  0403 01/19/18  0530    135*   K 3.2* 4.0    102   CO2 23 25   GLU 96 100   BUN 9 17   CREATININE 2.9* 3.9*   CALCIUM 8.9 9.4   PROT 5.3* 5.3*   ALBUMIN 2.5* 2.3*   BILITOT 2.0* 2.1*   ALKPHOS 108 115   AST 20 21   ALT <5* 5*   ANIONGAP 10 8   EGFRNONAA 28.1* 19.7*       Significant Imaging: I have reviewed all pertinent imaging results/findings within the past 24 hours.     CT CAP:   Multifocal patchy areas of consolidation in the left lung with a more confluent consolidation or atelectasis in the right lung.   Large right and small left pleural effusions which create local compressive atelectasis.  Simple collection about the inferior margin of the liver similar to the prior ultrasound.  Other incidental findings including biliary stent placement, pneumobilia, cardiomegaly, right and left sided central venous catheters, and body wall anasarca.    Microbiology:  1/11 blood cx: negative  1/11 pleural fluid cx: negative  1/11 ascites cx: negative  1/17 blood cx: negative  1/18 blood cx: negative  1/19 pleural fluid cx: pending  1/19  ascites cx: pending

## 2018-01-20 PROBLEM — K59.00 CONSTIPATION: Status: ACTIVE | Noted: 2018-01-20

## 2018-01-20 PROBLEM — K65.9 PERITONITIS: Status: RESOLVED | Noted: 2017-12-08 | Resolved: 2018-01-20

## 2018-01-20 LAB
ALBUMIN SERPL BCP-MCNC: 2.1 G/DL
ALP SERPL-CCNC: 127 U/L
ALT SERPL W/O P-5'-P-CCNC: <5 U/L
ANION GAP SERPL CALC-SCNC: 10 MMOL/L
AST SERPL-CCNC: 24 U/L
BASOPHILS # BLD AUTO: 0.03 K/UL
BASOPHILS NFR BLD: 0.5 %
BILIRUB SERPL-MCNC: 2.5 MG/DL
BUN SERPL-MCNC: 26 MG/DL
CALCIUM SERPL-MCNC: 9.8 MG/DL
CHLORIDE SERPL-SCNC: 99 MMOL/L
CO2 SERPL-SCNC: 24 MMOL/L
CREAT SERPL-MCNC: 4.7 MG/DL
CRYPTOC AG SER QL LA: NEGATIVE
CYCLOSPORINE BLD LC/MS/MS-MCNC: 166 NG/ML
DIFFERENTIAL METHOD: ABNORMAL
EOSINOPHIL # BLD AUTO: 0.3 K/UL
EOSINOPHIL NFR BLD: 5.2 %
ERYTHROCYTE [DISTWIDTH] IN BLOOD BY AUTOMATED COUNT: 17.1 %
EST. GFR  (AFRICAN AMERICAN): 18.2 ML/MIN/1.73 M^2
EST. GFR  (NON AFRICAN AMERICAN): 15.7 ML/MIN/1.73 M^2
GLUCOSE SERPL-MCNC: 89 MG/DL
HCT VFR BLD AUTO: 25.4 %
HCT VFR BLD AUTO: 26.4 %
HGB BLD-MCNC: 8.6 G/DL
HGB BLD-MCNC: 9 G/DL
IMM GRANULOCYTES # BLD AUTO: 0.19 K/UL
IMM GRANULOCYTES NFR BLD AUTO: 3.4 %
LYMPHOCYTES # BLD AUTO: 1.1 K/UL
LYMPHOCYTES NFR BLD: 19.3 %
MAGNESIUM SERPL-MCNC: 2.7 MG/DL
MCH RBC QN AUTO: 30.7 PG
MCHC RBC AUTO-ENTMCNC: 33.9 G/DL
MCV RBC AUTO: 91 FL
MONOCYTES # BLD AUTO: 1.3 K/UL
MONOCYTES NFR BLD: 23.2 %
NEUTROPHILS # BLD AUTO: 2.7 K/UL
NEUTROPHILS NFR BLD: 48.4 %
NRBC BLD-RTO: 0 /100 WBC
PLATELET # BLD AUTO: 186 K/UL
PMV BLD AUTO: 11.1 FL
POTASSIUM SERPL-SCNC: 4.2 MMOL/L
PROT SERPL-MCNC: 5.3 G/DL
RBC # BLD AUTO: 2.8 M/UL
SODIUM SERPL-SCNC: 133 MMOL/L
SPECIMEN SOURCE: NORMAL
TRIGL FLD-MCNC: 78 MG/DL
VANCOMYCIN SERPL-MCNC: 17.4 UG/ML
WBC # BLD AUTO: 5.61 K/UL

## 2018-01-20 PROCEDURE — 63600175 PHARM REV CODE 636 W HCPCS: Performed by: NURSE PRACTITIONER

## 2018-01-20 PROCEDURE — 90935 HEMODIALYSIS ONE EVALUATION: CPT

## 2018-01-20 PROCEDURE — B4185 PARENTERAL SOL 10 GM LIPIDS: HCPCS | Performed by: PHYSICIAN ASSISTANT

## 2018-01-20 PROCEDURE — 85018 HEMOGLOBIN: CPT

## 2018-01-20 PROCEDURE — 87385 HISTOPLASMA CAPSUL AG IA: CPT

## 2018-01-20 PROCEDURE — 90935 HEMODIALYSIS ONE EVALUATION: CPT | Mod: ,,, | Performed by: INTERNAL MEDICINE

## 2018-01-20 PROCEDURE — 87305 ASPERGILLUS AG IA: CPT

## 2018-01-20 PROCEDURE — 83735 ASSAY OF MAGNESIUM: CPT

## 2018-01-20 PROCEDURE — 80158 DRUG ASSAY CYCLOSPORINE: CPT

## 2018-01-20 PROCEDURE — 80053 COMPREHEN METABOLIC PANEL: CPT

## 2018-01-20 PROCEDURE — 99232 SBSQ HOSP IP/OBS MODERATE 35: CPT | Mod: ,,, | Performed by: INTERNAL MEDICINE

## 2018-01-20 PROCEDURE — 86403 PARTICLE AGGLUT ANTBDY SCRN: CPT

## 2018-01-20 PROCEDURE — 63600175 PHARM REV CODE 636 W HCPCS: Mod: JG | Performed by: SURGERY

## 2018-01-20 PROCEDURE — 25000003 PHARM REV CODE 250: Performed by: NURSE PRACTITIONER

## 2018-01-20 PROCEDURE — 80202 ASSAY OF VANCOMYCIN: CPT

## 2018-01-20 PROCEDURE — P9047 ALBUMIN (HUMAN), 25%, 50ML: HCPCS | Mod: JG | Performed by: SURGERY

## 2018-01-20 PROCEDURE — 87449 NOS EACH ORGANISM AG IA: CPT

## 2018-01-20 PROCEDURE — 63600175 PHARM REV CODE 636 W HCPCS: Performed by: PHYSICIAN ASSISTANT

## 2018-01-20 PROCEDURE — 85014 HEMATOCRIT: CPT

## 2018-01-20 PROCEDURE — 25000003 PHARM REV CODE 250: Performed by: PHYSICIAN ASSISTANT

## 2018-01-20 PROCEDURE — 25000003 PHARM REV CODE 250: Performed by: STUDENT IN AN ORGANIZED HEALTH CARE EDUCATION/TRAINING PROGRAM

## 2018-01-20 PROCEDURE — A4217 STERILE WATER/SALINE, 500 ML: HCPCS | Performed by: PHYSICIAN ASSISTANT

## 2018-01-20 PROCEDURE — 20600001 HC STEP DOWN PRIVATE ROOM

## 2018-01-20 PROCEDURE — 63600175 PHARM REV CODE 636 W HCPCS: Performed by: HOSPITALIST

## 2018-01-20 PROCEDURE — 85025 COMPLETE CBC W/AUTO DIFF WBC: CPT

## 2018-01-20 RX ORDER — ALBUMIN HUMAN 250 G/1000ML
25 SOLUTION INTRAVENOUS EVERY 6 HOURS
Status: COMPLETED | OUTPATIENT
Start: 2018-01-20 | End: 2018-01-20

## 2018-01-20 RX ORDER — OXYCODONE HYDROCHLORIDE 5 MG/1
10 TABLET ORAL ONCE
Status: COMPLETED | OUTPATIENT
Start: 2018-01-20 | End: 2018-01-20

## 2018-01-20 RX ADMIN — LEVOTHYROXINE SODIUM 75 MCG: 75 TABLET ORAL at 05:01

## 2018-01-20 RX ADMIN — ALBUMIN (HUMAN) 25 G: 12.5 SOLUTION INTRAVENOUS at 01:01

## 2018-01-20 RX ADMIN — SOYBEAN OIL 250 ML: 20 INJECTION, SOLUTION INTRAVENOUS at 09:01

## 2018-01-20 RX ADMIN — POLYETHYLENE GLYCOL 3350 17 G: 17 POWDER, FOR SOLUTION ORAL at 01:01

## 2018-01-20 RX ADMIN — DOCUSATE SODIUM 100 MG: 100 CAPSULE, LIQUID FILLED ORAL at 06:01

## 2018-01-20 RX ADMIN — HEPARIN SODIUM 1000 UNITS: 1000 INJECTION, SOLUTION INTRAVENOUS; SUBCUTANEOUS at 10:01

## 2018-01-20 RX ADMIN — CYCLOSPORINE 100 MG: 25 CAPSULE, LIQUID FILLED ORAL at 06:01

## 2018-01-20 RX ADMIN — OXYCODONE HYDROCHLORIDE 10 MG: 5 TABLET ORAL at 09:01

## 2018-01-20 RX ADMIN — VANCOMYCIN HYDROCHLORIDE 1000 MG: 1 INJECTION, POWDER, LYOPHILIZED, FOR SOLUTION INTRAVENOUS at 10:01

## 2018-01-20 RX ADMIN — ALBUMIN (HUMAN) 25 G: 12.5 SOLUTION INTRAVENOUS at 04:01

## 2018-01-20 RX ADMIN — CEFEPIME HYDROCHLORIDE 1 G: 1 INJECTION, SOLUTION INTRAVENOUS at 09:01

## 2018-01-20 RX ADMIN — LEVETIRACETAM 500 MG: 500 TABLET ORAL at 09:01

## 2018-01-20 RX ADMIN — OXYCODONE HYDROCHLORIDE 10 MG: 5 TABLET ORAL at 12:01

## 2018-01-20 RX ADMIN — URSODIOL 300 MG: 300 CAPSULE ORAL at 01:01

## 2018-01-20 RX ADMIN — ONDANSETRON HYDROCHLORIDE 4 MG: 2 INJECTION, SOLUTION INTRAMUSCULAR; INTRAVENOUS at 06:01

## 2018-01-20 RX ADMIN — DOCUSATE SODIUM 100 MG: 100 CAPSULE, LIQUID FILLED ORAL at 01:01

## 2018-01-20 RX ADMIN — LEVETIRACETAM 500 MG: 500 TABLET ORAL at 01:01

## 2018-01-20 RX ADMIN — SODIUM CHLORIDE 350 ML: 900 INJECTION, SOLUTION INTRAVENOUS at 10:01

## 2018-01-20 RX ADMIN — ALBUMIN (HUMAN) 25 G: 12.5 SOLUTION INTRAVENOUS at 06:01

## 2018-01-20 RX ADMIN — PROCHLORPERAZINE EDISYLATE 10 MG: 5 INJECTION INTRAMUSCULAR; INTRAVENOUS at 07:01

## 2018-01-20 RX ADMIN — HEPARIN SODIUM 5000 UNITS: 5000 INJECTION, SOLUTION INTRAVENOUS; SUBCUTANEOUS at 09:01

## 2018-01-20 RX ADMIN — OXYCODONE HYDROCHLORIDE 10 MG: 5 TABLET ORAL at 03:01

## 2018-01-20 RX ADMIN — PANTOPRAZOLE SODIUM 40 MG: 40 TABLET, DELAYED RELEASE ORAL at 01:01

## 2018-01-20 RX ADMIN — OXYCODONE HYDROCHLORIDE 10 MG: 5 TABLET ORAL at 07:01

## 2018-01-20 RX ADMIN — CYCLOSPORINE 100 MG: 25 CAPSULE, LIQUID FILLED ORAL at 05:01

## 2018-01-20 RX ADMIN — URSODIOL 300 MG: 300 CAPSULE ORAL at 09:01

## 2018-01-20 RX ADMIN — OXYCODONE HYDROCHLORIDE 10 MG: 5 TABLET ORAL at 08:01

## 2018-01-20 RX ADMIN — OXYCODONE HYDROCHLORIDE 10 MG: 5 TABLET ORAL at 04:01

## 2018-01-20 RX ADMIN — RETINOL, ERGOCALCIFEROL, .ALPHA.-TOCOPHEROL ACETATE, DL-, PHYTONADIONE, ASCORBIC ACID, NIACINAMIDE, RIBOFLAVIN 5-PHOSPHATE SODIUM, THIAMINE HYDROCHLORIDE, PYRIDOXINE HYDROCHLORIDE, DEXPANTHENOL, BIOTIN, FOLIC ACID, AND CYANOCOBALAMIN: KIT at 09:01

## 2018-01-20 RX ADMIN — ONDANSETRON HYDROCHLORIDE 4 MG: 2 INJECTION, SOLUTION INTRAMUSCULAR; INTRAVENOUS at 05:01

## 2018-01-20 RX ADMIN — ATOVAQUONE 1500 MG: 750 SUSPENSION ORAL at 01:01

## 2018-01-20 NOTE — PLAN OF CARE
Problem: Patient Care Overview  Goal: Plan of Care Review  Pt AAOx4, bed low locked, call light within reach, wearing nonskid socks. Pt instructed to use call light for assistance, pt verbalizes understanding.   Pt c/o pain at [place] site, PRN pain meds x 2, little relief noted. C/o N/V PO Zofran x 1, IV Zofran x 2, and Compazine given, pt still with complaints of nausea. TPN @ 40 mL/hr, Lipids @ 20.8. Pt still refusing enema, no BM this shift. Pt remains free from injury/harm at this time. Tmax 100.1. See flowsheet for full assessment/VS

## 2018-01-20 NOTE — ASSESSMENT & PLAN NOTE
- unclear source of fever, previously thought to be related to peritonitis  - blood cultures prelim NGTD. No urine cx obtained as pt anuric.   - Pt underwent thoracentesis, paracentesis, and drain placement into fluid collection 1/11/18.   - All fluids reviewed and negative for infection. Cultures NGTD.  - ID was following have since signed off. Cefepime d/c 1/15.   - Remains febrile since 1/17. Repeat blood cx NGTD. Resumed Vanc/Cefepime given fever 1/17.  - ID reconsulted --> fungal markers pending (crypto Ag, Aspergillus Ag, Fungitell Assay, Histo Ag)  - CT C/A/P with large R pleural effusion and ascites --> both drained 1/19 and negative for infection

## 2018-01-20 NOTE — PROGRESS NOTES
Vastly improved this week  New epithelial closure of middle of chevron incision . Left side almost completely closed.  Right side continues to contract.     01/19/18 1800       Incision/Site 10/30/17 0911 abdomen transverse   Date First Assessed/Time First Assessed: 10/30/17 0911   Present Prior to Hospital Arrival?: Yes  Location: abdomen  Orientation: transverse   Wound Image     Incision WDL ex   Dressing Appearance Dry;Intact   Drainage Amount Scant   Drainage Characteristics/Odor Serous   Appearance Red;Other (see comments)  (new epitheliazation )   Red (%), Wound Tissue Color 100 %   Periwound Area Dry;Scar tissue   Wound Edges Open   Wound Length (cm) 1.5  (left side 0.3x3x0.1cm)   Wound Width (cm) 14   Depth (cm) 0.2   Care Cleansed with:;Sterile normal saline;Applied:;Moisturizing agent   Dressing Applied;Foam     Continue present treatment   Zoë Murphy RN CWON  [id number]

## 2018-01-20 NOTE — ASSESSMENT & PLAN NOTE
- Paracentesis performed 1/11/18 with 1600 ml removed.    - fluid negative for infection.   - Repeat para 1/19 negative for infection per cell count

## 2018-01-20 NOTE — SUBJECTIVE & OBJECTIVE
Interval History: Patient doing OK - complains of abd pain and right sided pleury-type pain - tolerated thoracentesis and peritoneal fluid tap aseview of Systems   Constitutional: Negative for fever.   Cardiovascular: Positive for leg swelling.   Gastrointestinal: Positive for abdominal pain. Negative for diarrhea, nausea and vomiting.   Genitourinary:        Anuric     Allergic/Immunologic: Positive for immunocompromised state.     Objective:     Vital Signs (Most Recent):  Temp: 99 °F (37.2 °C) (01/19/18 1316)  Pulse: 93 (01/19/18 1316)  Resp: 18 (01/19/18 1316)  BP: 125/77 (01/19/18 1316)  SpO2: 98 % (01/19/18 1316) Vital Signs (24h Range):  Temp:  [98.8 °F (37.1 °C)-100.5 °F (38.1 °C)] 99 °F (37.2 °C)  Pulse:  [] 93  Resp:  [18] 18  SpO2:  [96 %-98 %] 98 %  BP: (114-125)/(63-79) 125/77     Weight: 68 kg (149 lb 14.6 oz)  Body mass index is 24.2 kg/m².    Estimated Creatinine Clearance: 25.4 mL/min (based on SCr of 3.9 mg/dL (H)).    Physical Exam   Constitutional: He is oriented to person, place, and time. He appears well-developed. No distress.   Temporal and distal extremity muscle wasting   HENT:   Head: Normocephalic and atraumatic.   Mouth/Throat: No oropharyngeal exudate.   Eyes: EOM are normal. Pupils are equal, round, and reactive to light. No scleral icterus.   Neck: Normal range of motion. Neck supple. No JVD present. No thyromegaly present.   Cardiovascular: Normal rate, regular rhythm, normal heart sounds and intact distal pulses.    No murmur heard.  Pulmonary/Chest: Effort normal. No respiratory distress. He has decreased breath sounds in the right middle field, the right lower field and the left lower field. He has no wheezes. He exhibits no tenderness.   Diminished to RLL   Abdominal: Soft. Bowel sounds are normal. He exhibits distension and ascites. There is tenderness (mild). There is no rebound and no guarding.   Wound vac removed.   Musculoskeletal: Normal range of motion. He exhibits  edema (2+ LE edema). He exhibits no tenderness.   Neurological: He is alert and oriented to person, place, and time. He has normal reflexes.   Skin: Skin is warm and dry. Capillary refill takes 2 to 3 seconds. He is not diaphoretic. No erythema.   Psychiatric: He has a normal mood and affect. His behavior is normal. Judgment and thought content normal. His mood appears not anxious.   Nursing note and vitals reviewed.           Review of Systems  Objective:     Vital Signs (Most Recent):  Temp: 99 °F (37.2 °C) (01/20/18 1200)  Pulse: 90 (01/20/18 1200)  Resp: 18 (01/20/18 0815)  BP: 123/77 (01/20/18 1200)  SpO2: 96 % (01/20/18 0730) Vital Signs (24h Range):  Temp:  [98 °F (36.7 °C)-100.1 °F (37.8 °C)] 99 °F (37.2 °C)  Pulse:  [] 90  Resp:  [16-18] 18  SpO2:  [95 %-97 %] 96 %  BP: (101-128)/(65-80) 123/77     Weight: 68 kg (149 lb 14.6 oz)  Body mass index is 24.2 kg/m².    Estimated Creatinine Clearance: 21.1 mL/min (based on SCr of 4.7 mg/dL (H)).    Physical Exam    Significant Labs: All pertinent labs within the past 24 hours have been reviewed.    Significant Imaging: I have reviewed all pertinent imaging results/findings within the past 24 hours.

## 2018-01-20 NOTE — PROGRESS NOTES
Pt arrived on unit via W/C. Standing weight obtained @ 68kg. HD tx started via right CVC. Ports aspirated and flushed without difficulty. Lines connected and secured. Good blood flows established.

## 2018-01-20 NOTE — ASSESSMENT & PLAN NOTE
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, CMV D+/R+, steroid induction, on maintenance tacro/MMF/pred; c/b seizures, LAURA, superificial wound infection s/p wound vac to [facility] through 1/12, and several recent admissions on 11/24 and 12/3 with culture negative peritonitis with peak WBC ~5k due to suspected indolent biliary leakage s/p sphincterotomy/biliary stent placement over CBD stricture on 12/6 ERCP although notably with no overt leak noted during procedure) who was admitted on 1/11/2017 with acute onset fevers and acute on chronic N/V/abdominal pain (RLQ worst) in the setting of persistent noninflammatory ascites of unknown etiology. He underwent drainage of pleural fluid and ascites that did not show evidence of gross infection and his fevers initially abated on empiric cefepime that was stopped due to a lack of bacterial process found during evaluation. His fevers and symptoms have recurred off antibiotics and ERCP failed to reveal significant biliary pathology as the cause of his FUO. He remains tenuous.  Update on 1/20 - no fevers or chills - pleural and peritoneal fluid do not look infected - will follow culturs     - would continue empiric vanc/cefepime for now in addition to prophylaxis  - await pending pleural/peritoneal fluid cultures - so far NGTD   - would obtain liver biopsy with CMV immunostaining  - have added on fungal markers given FUO (although I do not think he has pneumonia; more likely that his R lung findings are due to compressive atelectasis from his pleural effusion)

## 2018-01-20 NOTE — ASSESSMENT & PLAN NOTE
- h/o constipation, reports having regular BM's with bowel regimen  - vomiting episode x 1 at home. Anti-emetics ordered PRN.  - KUB repeated 1/14/18 given increased abdominal pain- mild, generalized bowel distention suggesting ileus.  Diet changed to clears as tolerated for bowel rest   - SHANE tube placed 1/16 for tube feeds. Not tolerating tube feeds, stopped 1/17/18.  - D/C shane 1/19  - chronic constipation, Cont bowel regiment.

## 2018-01-20 NOTE — PROGRESS NOTES
HD tx completed. 3.5 hours. 2.5 liters removed. Right CVC locked with heparin, capped and taped. Tolerated tx well.

## 2018-01-20 NOTE — ASSESSMENT & PLAN NOTE
- HD resumed on previous admission. Now anuric and dialyzes M-W-F.  - Nephrology following.    - HD today since held Friday for priya/para

## 2018-01-20 NOTE — PROGRESS NOTES
[facility]  Infectious Disease  Progress Note    Patient Name: Jhonny Diana  MRN: [id number]  Admission Date: 1/11/2018  Length of Stay: 9 days  Attending Physician: Nathanael Medina MD  Primary Care Provider: Primary Doctor No    Isolation Status: No active isolations  Assessment/Plan:      * Fever    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+, steroid induction, on maintenance tacro/MMF/pred; c/b seizures, LAURA, superificial wound infection s/p wound vac to [facility] through 1/12, and several recent admissions on 11/24 and 12/3 with culture negative peritonitis with peak WBC ~5k due to suspected indolent biliary leakage s/p sphincterotomy/biliary stent placement over CBD stricture on 12/6 ERCP although notably with no overt leak noted during procedure) who was admitted on 1/11/2017 with acute onset fevers and acute on chronic N/V/abdominal pain (RLQ worst) in the setting of persistent noninflammatory ascites of unknown etiology. He underwent drainage of pleural fluid and ascites that did not show evidence of gross infection and his fevers initially abated on empiric cefepime that was stopped due to a lack of bacterial process found during evaluation. His fevers and symptoms have recurred off antibiotics and ERCP failed to reveal significant biliary pathology as the cause of his FUO. He remains tenuous.  Update on 1/20 - no fevers or chills - pleural and peritoneal fluid do not look infected - will follow culturs     - would continue empiric vanc/cefepime for now in addition to prophylaxis  - await pending pleural/peritoneal fluid cultures - so far NGTD   - would obtain liver biopsy with CMV immunostaining  - have added on fungal markers given FUO (although I do not think he has pneumonia; more likely that his R lung findings are due to compressive atelectasis from his pleural effusion)            Anticipated Disposition:      Thank you for your consult. I will follow-up with patient. Please contact us if you have any additional questions.    Devante Schofield MD  Infectious Disease  [facility]    Subjective:     Principal Problem:Fever    HPI: No notes on file  Interval History: Patient doing OK - complains of abd pain and right sided pleury-type pain - tolerated thoracentesis and peritoneal fluid tap [place] of Systems   Constitutional: Negative for fever.   Cardiovascular: Positive for leg swelling.   Gastrointestinal: Positive for abdominal pain. Negative for diarrhea, nausea and vomiting.   Genitourinary:        Anuric     Allergic/Immunologic: Positive for immunocompromised state.     Objective:     Vital Signs (Most Recent):  Temp: 99 °F (37.2 °C) (01/19/18 1316)  Pulse: 93 (01/19/18 1316)  Resp: 18 (01/19/18 1316)  BP: 125/77 (01/19/18 1316)  SpO2: 98 % (01/19/18 1316) Vital Signs (24h Range):  Temp:  [98.8 °F (37.1 °C)-100.5 °F (38.1 °C)] 99 °F (37.2 °C)  Pulse:  [] 93  Resp:  [18] 18  SpO2:  [96 %-98 %] 98 %  BP: (114-125)/(63-79) 125/77     Weight: 68 kg (149 lb 14.6 oz)  Body mass index is 24.2 kg/m².    Estimated Creatinine Clearance: 25.4 mL/min (based on SCr of 3.9 mg/dL (H)).    Physical Exam   Constitutional: He is oriented to person, place, and time. He appears well-developed. No distress.   Temporal and distal extremity muscle wasting   HENT:   Head: Normocephalic and atraumatic.   Mouth/Throat: No oropharyngeal exudate.   Eyes: EOM are normal. Pupils are equal, round, and reactive to light. No scleral icterus.   Neck: Normal range of motion. Neck supple. No JVD present. No thyromegaly present.   Cardiovascular: Normal rate, regular rhythm, normal heart sounds and intact distal pulses.    No murmur heard.  Pulmonary/Chest: Effort normal. No respiratory distress. He has decreased breath sounds in the right middle field, the right lower field and the left lower field. He has no wheezes. He  exhibits no tenderness.   Diminished to RLL   Abdominal: Soft. Bowel sounds are normal. He exhibits distension and ascites. There is tenderness (mild). There is no rebound and no guarding.   Wound vac removed.   Musculoskeletal: Normal range of motion. He exhibits edema (2+ LE edema). He exhibits no tenderness.   Neurological: He is alert and oriented to person, place, and time. He has normal reflexes.   Skin: Skin is warm and dry. Capillary refill takes 2 to 3 seconds. He is not diaphoretic. No erythema.   Psychiatric: He has a normal mood and affect. His behavior is normal. Judgment and thought content normal. His mood appears not anxious.   Nursing note and vitals reviewed.           Review of Systems  Objective:     Vital Signs (Most Recent):  Temp: 99 °F (37.2 °C) (01/20/18 1200)  Pulse: 90 (01/20/18 1200)  Resp: 18 (01/20/18 0815)  BP: 123/77 (01/20/18 1200)  SpO2: 96 % (01/20/18 0730) Vital Signs (24h Range):  Temp:  [98 °F (36.7 °C)-100.1 °F (37.8 °C)] 99 °F (37.2 °C)  Pulse:  [] 90  Resp:  [16-18] 18  SpO2:  [95 %-97 %] 96 %  BP: (101-128)/(65-80) 123/77     Weight: 68 kg (149 lb 14.6 oz)  Body mass index is 24.2 kg/m².    Estimated Creatinine Clearance: 21.1 mL/min (based on SCr of 4.7 mg/dL (H)).    Physical Exam    Significant Labs: All pertinent labs within the past 24 hours have been reviewed.    Significant Imaging: I have reviewed all pertinent imaging results/findings within the past 24 hours.

## 2018-01-20 NOTE — SUBJECTIVE & OBJECTIVE
Scheduled Meds:   sodium chloride 0.9%   Intravenous Once    albumin human 25%  25 g Intravenous Q6H    atovaquone  1,500 mg Per NG tube Daily    ceFEPime (MAXIPIME) IVPB  1 g Intravenous Q12H    cycloSPORINE modified  100 mg Oral BID    docusate sodium  100 mg Oral TID PC    epoetin maurisio (PROCRIT) injection  10,000 Units Intravenous Every Mon, Wed, Fri    glycerin 99.5%  100 mL Rectal Once    And    magnesium citrate  300 mL Rectal Once    And    sodium chloride 0.9%  500 mL Rectal Once    heparin (porcine)  5,000 Units Subcutaneous Q12H    levETIRAcetam  500 mg Oral BID    levothyroxine  75 mcg Oral Before breakfast    pantoprazole  40 mg Oral Daily    polyethylene glycol  17 g Oral Daily    ursodiol  300 mg Oral BID     Continuous Infusions:   TPN ADULT CENTRAL LINE CUSTOM 40 mL/hr at 01/19/18 2149     PRN Meds:sodium chloride, sodium chloride 0.9%, sodium chloride 0.9%, sodium chloride 0.9%, acetaminophen, heparin (porcine), omnipaque, ondansetron, ondansetron, oxyCODONE, oxyCODONE, prochlorperazine, sodium chloride 0.9%    Review of Systems   Constitutional: Positive for activity change, appetite change, fatigue and fever. Negative for chills.   HENT: Negative.    Respiratory: Negative for cough, shortness of breath and wheezing.    Cardiovascular: Positive for leg swelling. Negative for chest pain.   Gastrointestinal: Positive for abdominal distention, abdominal pain, constipation, nausea and vomiting.   Genitourinary: Negative for difficulty urinating.        Anuric   Musculoskeletal: Negative for arthralgias, back pain and myalgias.   Skin: Positive for wound. Negative for color change and rash.   Allergic/Immunologic: Positive for immunocompromised state.   Neurological: Positive for weakness. Negative for dizziness, seizures and headaches.   Psychiatric/Behavioral: Positive for decreased concentration and dysphoric mood. Negative for confusion. The patient is not nervous/anxious.    All  other systems reviewed and are negative.    Objective:     Vital Signs (Most Recent):  Temp: 98 °F (36.7 °C) (01/20/18 0815)  Pulse: 94 (01/20/18 1015)  Resp: 18 (01/20/18 0815)  BP: 124/77 (01/20/18 1015)  SpO2: 96 % (01/20/18 0730) Vital Signs (24h Range):  Temp:  [98 °F (36.7 °C)-100.1 °F (37.8 °C)] 98 °F (36.7 °C)  Pulse:  [] 94  Resp:  [16-18] 18  SpO2:  [95 %-98 %] 96 %  BP: (101-125)/(65-80) 124/77     Weight: 68 kg (149 lb 14.6 oz)  Body mass index is 24.2 kg/m².    Intake/Output - Last 3 Shifts       01/18 0700 - 01/19 0659 01/19 0700 - 01/20 0659 01/20 0700 - 01/21 0659    P.O. 260 60     I.V. (mL/kg)       Blood  0     Other       NG/ 190     IV Piggyback 100      .9      Total Intake(mL/kg) 1196.9 (17.6) 250 (3.7)     Urine (mL/kg/hr) 0 (0) 0 (0)     Emesis/NG output 0 (0)      Other  2700 (1.7)     Stool 0 (0) 0 (0)     Blood       Total Output 0 2700      Net +1196.9 -2450             Urine Occurrence 1 x 0 x     Stool Occurrence 0 x 0 x     Emesis Occurrence 0 x            Physical Exam   Constitutional: He is oriented to person, place, and time. He appears well-developed. No distress.   Temporal and distal extremity muscle wasting   HENT:   Head: Normocephalic and atraumatic.   Mouth/Throat: No oropharyngeal exudate.   Eyes: EOM are normal. Pupils are equal, round, and reactive to light. Scleral icterus is present.   Neck: Normal range of motion. Neck supple. No JVD present. No thyromegaly present.   Cardiovascular: Normal rate, regular rhythm, normal heart sounds and intact distal pulses.    No murmur heard.  Pulmonary/Chest: Effort normal. No respiratory distress. He has decreased breath sounds in the right middle field, the right lower field and the left lower field. He has no wheezes. He exhibits no tenderness.   Diminished to RLL   Abdominal: Soft. Bowel sounds are normal. He exhibits distension and ascites. There is tenderness (mild). There is no rebound and no guarding.    Dressing to chevron.  Wd vac removed 1/12/18   Musculoskeletal: Normal range of motion. He exhibits edema (2+ LE edema). He exhibits no tenderness.   Neurological: He is alert and oriented to person, place, and time. He has normal reflexes.   Skin: Skin is warm and dry. Capillary refill takes 2 to 3 seconds. He is not diaphoretic. No erythema.   jaundice   Psychiatric: He has a normal mood and affect. His behavior is normal. Judgment and thought content normal. His mood appears not anxious.   Depressed mood   Nursing note and vitals reviewed.      Laboratory:  Immunosuppressants         Stop Route Frequency     cycloSPORINE modified (NEORAL) capsule 100 mg      -- Oral 2 times daily        CBC:     Recent Labs  Lab 01/20/18  0500   WBC 5.61   RBC 2.80*   HGB 8.6*   HCT 25.4*      MCV 91   MCH 30.7   MCHC 33.9     CMP:     Recent Labs  Lab 01/20/18  0500   GLU 89   CALCIUM 9.8   ALBUMIN 2.1*   PROT 5.3*   *   K 4.2   CO2 24   CL 99   BUN 26*   CREATININE 4.7*   ALKPHOS 127   ALT <5*   AST 24   BILITOT 2.5*     Coagulation:   No results for input(s): PT, INR, APTT in the last 168 hours.  Labs within the past 24 hours have been reviewed.    Diagnostic Results:  I have personally reviewed all pertinent imaging studies.

## 2018-01-21 LAB
ABO + RH BLD: NORMAL
ALBUMIN SERPL BCP-MCNC: 2.4 G/DL
ALP SERPL-CCNC: 134 U/L
ALT SERPL W/O P-5'-P-CCNC: 6 U/L
ANION GAP SERPL CALC-SCNC: 9 MMOL/L
AST SERPL-CCNC: 21 U/L
BASOPHILS # BLD AUTO: 0.02 K/UL
BASOPHILS NFR BLD: 0.4 %
BILIRUB SERPL-MCNC: 2.6 MG/DL
BLD GP AB SCN CELLS X3 SERPL QL: NORMAL
BLD PROD TYP BPU: NORMAL
BLOOD UNIT EXPIRATION DATE: NORMAL
BLOOD UNIT TYPE CODE: 5100
BLOOD UNIT TYPE: NORMAL
BUN SERPL-MCNC: 17 MG/DL
CALCIUM SERPL-MCNC: 9.1 MG/DL
CHLORIDE SERPL-SCNC: 102 MMOL/L
CO2 SERPL-SCNC: 25 MMOL/L
CODING SYSTEM: NORMAL
CREAT SERPL-MCNC: 3.2 MG/DL
CYCLOSPORINE BLD LC/MS/MS-MCNC: 166 NG/ML
DIFFERENTIAL METHOD: ABNORMAL
DISPENSE STATUS: NORMAL
EOSINOPHIL # BLD AUTO: 0.2 K/UL
EOSINOPHIL NFR BLD: 4.5 %
ERYTHROCYTE [DISTWIDTH] IN BLOOD BY AUTOMATED COUNT: 17.2 %
EST. GFR  (AFRICAN AMERICAN): 28.9 ML/MIN/1.73 M^2
EST. GFR  (NON AFRICAN AMERICAN): 25 ML/MIN/1.73 M^2
GLUCOSE SERPL-MCNC: 95 MG/DL
HCT VFR BLD AUTO: 22.4 %
HCT VFR BLD AUTO: 22.4 %
HGB BLD-MCNC: 7.6 G/DL
HGB BLD-MCNC: 7.8 G/DL
IMM GRANULOCYTES # BLD AUTO: 0.21 K/UL
IMM GRANULOCYTES NFR BLD AUTO: 4.3 %
LYMPHOCYTES # BLD AUTO: 1.2 K/UL
LYMPHOCYTES NFR BLD: 23.7 %
MAGNESIUM SERPL-MCNC: 2.3 MG/DL
MCH RBC QN AUTO: 31.3 PG
MCHC RBC AUTO-ENTMCNC: 33.9 G/DL
MCV RBC AUTO: 92 FL
MONOCYTES # BLD AUTO: 1.1 K/UL
MONOCYTES NFR BLD: 22.3 %
NEUTROPHILS # BLD AUTO: 2.2 K/UL
NEUTROPHILS NFR BLD: 44.8 %
NRBC BLD-RTO: 0 /100 WBC
NUM UNITS TRANS PACKED RBC: NORMAL
PHOSPHATE SERPL-MCNC: <1 MG/DL
PLATELET # BLD AUTO: 165 K/UL
PMV BLD AUTO: 11.1 FL
POTASSIUM SERPL-SCNC: 3.5 MMOL/L
PROT SERPL-MCNC: 5.6 G/DL
RBC # BLD AUTO: 2.43 M/UL
SODIUM SERPL-SCNC: 136 MMOL/L
VANCOMYCIN SERPL-MCNC: 31.3 UG/ML
WBC # BLD AUTO: 4.85 K/UL

## 2018-01-21 PROCEDURE — 83735 ASSAY OF MAGNESIUM: CPT

## 2018-01-21 PROCEDURE — P9016 RBC LEUKOCYTES REDUCED: HCPCS

## 2018-01-21 PROCEDURE — 85014 HEMATOCRIT: CPT

## 2018-01-21 PROCEDURE — 25000003 PHARM REV CODE 250: Performed by: STUDENT IN AN ORGANIZED HEALTH CARE EDUCATION/TRAINING PROGRAM

## 2018-01-21 PROCEDURE — 85018 HEMOGLOBIN: CPT

## 2018-01-21 PROCEDURE — 63600175 PHARM REV CODE 636 W HCPCS: Performed by: NURSE PRACTITIONER

## 2018-01-21 PROCEDURE — 63600175 PHARM REV CODE 636 W HCPCS: Performed by: PHYSICIAN ASSISTANT

## 2018-01-21 PROCEDURE — 20600001 HC STEP DOWN PRIVATE ROOM

## 2018-01-21 PROCEDURE — 25000003 PHARM REV CODE 250: Performed by: NURSE PRACTITIONER

## 2018-01-21 PROCEDURE — 80202 ASSAY OF VANCOMYCIN: CPT

## 2018-01-21 PROCEDURE — 86920 COMPATIBILITY TEST SPIN: CPT

## 2018-01-21 PROCEDURE — A4217 STERILE WATER/SALINE, 500 ML: HCPCS | Performed by: PHYSICIAN ASSISTANT

## 2018-01-21 PROCEDURE — B4185 PARENTERAL SOL 10 GM LIPIDS: HCPCS | Performed by: PHYSICIAN ASSISTANT

## 2018-01-21 PROCEDURE — 36430 TRANSFUSION BLD/BLD COMPNT: CPT

## 2018-01-21 PROCEDURE — 86850 RBC ANTIBODY SCREEN: CPT

## 2018-01-21 PROCEDURE — 85025 COMPLETE CBC W/AUTO DIFF WBC: CPT

## 2018-01-21 PROCEDURE — 25000003 PHARM REV CODE 250: Performed by: PHYSICIAN ASSISTANT

## 2018-01-21 PROCEDURE — 84100 ASSAY OF PHOSPHORUS: CPT

## 2018-01-21 PROCEDURE — 80053 COMPREHEN METABOLIC PANEL: CPT

## 2018-01-21 PROCEDURE — 80158 DRUG ASSAY CYCLOSPORINE: CPT

## 2018-01-21 RX ORDER — HYDROCODONE BITARTRATE AND ACETAMINOPHEN 500; 5 MG/1; MG/1
TABLET ORAL
Status: DISCONTINUED | OUTPATIENT
Start: 2018-01-21 | End: 2018-01-26

## 2018-01-21 RX ORDER — OXYCODONE HYDROCHLORIDE 5 MG/1
10 TABLET ORAL EVERY 4 HOURS PRN
Status: DISCONTINUED | OUTPATIENT
Start: 2018-01-21 | End: 2018-01-25

## 2018-01-21 RX ORDER — PSEUDOEPHEDRINE/ACETAMINOPHEN 30MG-500MG
100 TABLET ORAL ONCE
Status: COMPLETED | OUTPATIENT
Start: 2018-01-21 | End: 2018-01-21

## 2018-01-21 RX ORDER — SYRING-NEEDL,DISP,INSUL,0.3 ML 29 G X1/2"
300 SYRINGE, EMPTY DISPOSABLE MISCELLANEOUS ONCE
Status: COMPLETED | OUTPATIENT
Start: 2018-01-21 | End: 2018-01-21

## 2018-01-21 RX ADMIN — OXYCODONE HYDROCHLORIDE 10 MG: 5 TABLET ORAL at 05:01

## 2018-01-21 RX ADMIN — DOCUSATE SODIUM 100 MG: 100 CAPSULE, LIQUID FILLED ORAL at 08:01

## 2018-01-21 RX ADMIN — Medication 100 ML: at 03:01

## 2018-01-21 RX ADMIN — ONDANSETRON HYDROCHLORIDE 4 MG: 2 INJECTION, SOLUTION INTRAMUSCULAR; INTRAVENOUS at 10:01

## 2018-01-21 RX ADMIN — DOCUSATE SODIUM 100 MG: 100 CAPSULE, LIQUID FILLED ORAL at 09:01

## 2018-01-21 RX ADMIN — OXYCODONE HYDROCHLORIDE 5 MG: 5 TABLET ORAL at 10:01

## 2018-01-21 RX ADMIN — CEFEPIME HYDROCHLORIDE 1 G: 1 INJECTION, SOLUTION INTRAVENOUS at 09:01

## 2018-01-21 RX ADMIN — SOYBEAN OIL 250 ML: 20 INJECTION, SOLUTION INTRAVENOUS at 08:01

## 2018-01-21 RX ADMIN — LEVETIRACETAM 500 MG: 500 TABLET ORAL at 09:01

## 2018-01-21 RX ADMIN — LEVETIRACETAM 500 MG: 500 TABLET ORAL at 08:01

## 2018-01-21 RX ADMIN — CYCLOSPORINE 100 MG: 25 CAPSULE, LIQUID FILLED ORAL at 08:01

## 2018-01-21 RX ADMIN — SODIUM CHLORIDE 500 ML: 0.9 INJECTION, SOLUTION INTRAVENOUS at 03:01

## 2018-01-21 RX ADMIN — PANTOPRAZOLE SODIUM 40 MG: 40 TABLET, DELAYED RELEASE ORAL at 08:01

## 2018-01-21 RX ADMIN — POTASSIUM PHOSPHATE, MONOBASIC AND POTASSIUM PHOSPHATE, DIBASIC 40 MMOL: 224; 236 INJECTION, SOLUTION, CONCENTRATE INTRAVENOUS at 06:01

## 2018-01-21 RX ADMIN — OXYCODONE HYDROCHLORIDE 10 MG: 5 TABLET ORAL at 02:01

## 2018-01-21 RX ADMIN — URSODIOL 300 MG: 300 CAPSULE ORAL at 08:01

## 2018-01-21 RX ADMIN — HEPARIN SODIUM 5000 UNITS: 5000 INJECTION, SOLUTION INTRAVENOUS; SUBCUTANEOUS at 08:01

## 2018-01-21 RX ADMIN — URSODIOL 300 MG: 300 CAPSULE ORAL at 09:01

## 2018-01-21 RX ADMIN — RETINOL, ERGOCALCIFEROL, .ALPHA.-TOCOPHEROL ACETATE, DL-, PHYTONADIONE, ASCORBIC ACID, NIACINAMIDE, RIBOFLAVIN 5-PHOSPHATE SODIUM, THIAMINE HYDROCHLORIDE, PYRIDOXINE HYDROCHLORIDE, DEXPANTHENOL, BIOTIN, FOLIC ACID, AND CYANOCOBALAMIN: KIT at 08:01

## 2018-01-21 RX ADMIN — OXYCODONE HYDROCHLORIDE 10 MG: 5 TABLET ORAL at 01:01

## 2018-01-21 RX ADMIN — LEVOTHYROXINE SODIUM 75 MCG: 75 TABLET ORAL at 05:01

## 2018-01-21 RX ADMIN — PROCHLORPERAZINE EDISYLATE 10 MG: 5 INJECTION INTRAMUSCULAR; INTRAVENOUS at 01:01

## 2018-01-21 RX ADMIN — ATOVAQUONE 1500 MG: 750 SUSPENSION ORAL at 08:01

## 2018-01-21 RX ADMIN — CYCLOSPORINE 100 MG: 25 CAPSULE, LIQUID FILLED ORAL at 05:01

## 2018-01-21 RX ADMIN — HEPARIN SODIUM 5000 UNITS: 5000 INJECTION, SOLUTION INTRAVENOUS; SUBCUTANEOUS at 09:01

## 2018-01-21 RX ADMIN — OXYCODONE HYDROCHLORIDE 10 MG: 5 TABLET ORAL at 10:01

## 2018-01-21 RX ADMIN — POLYETHYLENE GLYCOL 3350 17 G: 17 POWDER, FOR SOLUTION ORAL at 08:01

## 2018-01-21 RX ADMIN — OXYCODONE HYDROCHLORIDE 10 MG: 5 TABLET ORAL at 06:01

## 2018-01-21 RX ADMIN — MAGNESIUM CITRATE 300 ML: 1.75 LIQUID ORAL at 03:01

## 2018-01-21 RX ADMIN — CEFEPIME HYDROCHLORIDE 1 G: 1 INJECTION, SOLUTION INTRAVENOUS at 10:01

## 2018-01-21 NOTE — ASSESSMENT & PLAN NOTE
- HD resumed on previous admission. Now anuric and dialyzes M-W-F.  - Nephrology following.    - HD Sat since held Friday for thora/para  - Resume MWF schedule

## 2018-01-21 NOTE — PROGRESS NOTES
[facility]  Liver Transplant  Progress Note    Patient Name: Jhonny Diana  MRN: [id number]  Admission Date: 2018  Hospital Length of Stay: 10 days  Code Status: Full Code  Primary Care Provider: Primary Doctor No  Post-Operative Day: 94    ORGAN:   LIVER  Disease Etiology: Acute Alcoholic Hepatitis  Donor Type:    - Brain Death  CDC High Risk:   No  Donor CMV Status:   Donor CMV Status: Positive  Donor HBcAB:   Negative  Donor HCV Status:   Negative  Whole or Partial: Whole Liver  Biliary Anastomosis: End to End  Arterial Anatomy: Standard  Subjective:     History of Present Illness:  Jhonny Diana is a 28 y/o male with past medical history of alcoholic cirrhosis.  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 Found to have peritonitis in November (WBC 5000, 75% PNM) neg for bile leak. He was treated initially with vanc/cefepime. Repeat cell counts  with some improvement (WBC 1400, 45% PNM). He has undergone multiple paracenteses as well as abscess drainage of perihepatic fluid collections and treated with antimicrobial therapy but no positive cultures. Of note, biliary stricture also identified and ERCP performed on 2017 with sphincterotomy and biliary stent placed. Liver tests still have not normalized despite intervention, bilirubin and AP remain elevated. Other pertinent PMH current wound vac in place 2/2 wound infection, malnutrition requiring TPN for short course and ongoing hypoalbuminemia, and seizure activity while on prograf and has since been switched to cyclosporine without reoccurrence.  He presented to the ER for fever, abdominal pain, and N/V. He reports fever (103) for 1  day prior. Overnight, he developed N/V, reports small amount of green emesis with new left sided pain. He also endorses worsening SOB with exertion. He was scheduled as an outpatient for follow up paracentesis and IR drainage of fluid collection. CXR in ER shows large pleural effusion with subsegmental atelectasis. Infectious work up initiated in ER. His ANC is 900. Broad spectrum antibiotics initiated in ED. He denies chest pain, palpitations, diarrhea, constipation, or back pain. Denies any sick contacts.    Hospital Course:  Thoracentesis (1.2L off), Paracentesis (1.6L off), and IR drainage of fluid collection 1/11, all fluids negative for infection. ID consulted. Broad spectrum antibiotics d/c'd 1/15. Chronically malnourished with poor PO intake, prealbumin 7. Alex tube placed 1/15 for tube feedings.     ERCP 1/17 with sludge and a biliary stone which was removed and stent exchanged.  Remained with n/v; therefore, TF remained on hold and TPN continued.   Pt with fever s/p ERCP on 1/17 which continued until 1/19.  Vanc/cefepime restarted.  Blood cx 1/17 and 1/18 NGTD.  ID reconsulted.  Fungal markers sent.  CT C/A/P obtained.  With large R pleural effusion and ascites- both drained 1/19 and negative for infx per cell count. Pt afebrile 1/20.     Interval History: Still with signif nausea.  Large BM yesterday with enema.  Will repeat today as still significantly constipated on abd xray.  Pt will attempt to eat again today.  Continue TPN.  Encouraged ambulation at least 4 times today.  Spirits improved today.    Scheduled Meds:   atovaquone  1,500 mg Per NG tube Daily    ceFEPime (MAXIPIME) IVPB  1 g Intravenous Q12H    cycloSPORINE modified  100 mg Oral BID    docusate sodium  100 mg Oral TID PC    epoetin maurisio (PROCRIT) injection  10,000 Units Intravenous Every Mon, Wed, Fri    fat emulsion 20%  250 mL Intravenous Daily    glycerin 99.5%  100 mL Rectal Once    And    magnesium citrate  300 mL Rectal Once     And    sodium chloride 0.9%  500 mL Rectal Once    heparin (porcine)  5,000 Units Subcutaneous Q12H    levETIRAcetam  500 mg Oral BID    levothyroxine  75 mcg Oral Before breakfast    pantoprazole  40 mg Oral Daily    polyethylene glycol  17 g Oral Daily    potassium phosphate IVPB  40 mmol Intravenous Once    ursodiol  300 mg Oral BID     Continuous Infusions:   TPN ADULT CENTRAL LINE CUSTOM 40 mL/hr at 01/20/18 2140     PRN Meds:sodium chloride, sodium chloride 0.9%, sodium chloride 0.9%, sodium chloride 0.9%, acetaminophen, heparin (porcine), omnipaque, ondansetron, ondansetron, oxyCODONE, prochlorperazine, sodium chloride 0.9%    Review of Systems   Constitutional: Positive for activity change, appetite change and fatigue. Negative for chills and fever.   HENT: Negative.    Respiratory: Negative for cough, shortness of breath and wheezing.    Cardiovascular: Positive for leg swelling. Negative for chest pain.   Gastrointestinal: Positive for abdominal distention, abdominal pain, constipation and nausea. Negative for vomiting.   Genitourinary: Negative for difficulty urinating.        Anuric   Musculoskeletal: Negative for arthralgias, back pain and myalgias.   Skin: Positive for wound. Negative for color change and rash.   Allergic/Immunologic: Positive for immunocompromised state.   Neurological: Positive for weakness. Negative for dizziness, seizures and headaches.   Psychiatric/Behavioral: Positive for decreased concentration and dysphoric mood. Negative for confusion. The patient is not nervous/anxious.    All other systems reviewed and are negative.    Objective:     Vital Signs (Most Recent):  Temp: 98.5 °F (36.9 °C) (01/21/18 0838)  Pulse: 81 (01/21/18 0838)  Resp: 15 (01/21/18 0838)  BP: (!) 99/59 (01/21/18 0838)  SpO2: 97 % (01/21/18 0838) Vital Signs (24h Range):  Temp:  [98.5 °F (36.9 °C)-99.7 °F (37.6 °C)] 98.5 °F (36.9 °C)  Pulse:  [81-94] 81  Resp:  [15-18] 15  SpO2:  [95 %-97 %] 97  %  BP: ()/(59-80) 99/59     Weight: 67.7 kg (149 lb 4 oz)  Body mass index is 24.09 kg/m².    Intake/Output - Last 3 Shifts       01/19 0700 - 01/20 0659 01/20 0700 - 01/21 0659 01/21 0700 - 01/22 0659    P.O. 60 1090     I.V. (mL/kg)  100 (1.5)     Blood 0      Other  600     NG/      IV Piggyback  350     TPN  768.4     Total Intake(mL/kg) 250 (3.7) 2908.4 (43)     Urine (mL/kg/hr) 0 (0) 25 (0)     Emesis/NG output       Other 2700 (1.7) 3100 (1.9)     Stool 0 (0) 0 (0)     Total Output 2700 3125      Net -2450 -216.6             Urine Occurrence 0 x      Stool Occurrence 0 x 1 x           Physical Exam   Constitutional: He is oriented to person, place, and time. He appears well-developed. No distress.   Temporal and distal extremity muscle wasting   HENT:   Head: Normocephalic and atraumatic.   Mouth/Throat: No oropharyngeal exudate.   Eyes: EOM are normal. Pupils are equal, round, and reactive to light. Scleral icterus is present.   Neck: Normal range of motion. Neck supple. No JVD present. No thyromegaly present.   Cardiovascular: Normal rate, regular rhythm, normal heart sounds and intact distal pulses.    No murmur heard.  Pulmonary/Chest: Effort normal. No respiratory distress. He has decreased breath sounds in the right middle field, the right lower field and the left lower field. He has no wheezes. He exhibits no tenderness.   Diminished to RLL   Abdominal: Soft. Bowel sounds are normal. He exhibits distension and ascites. There is tenderness (mild). There is no rebound and no guarding.   Dressing to chevron.  Wd vac removed 1/12/18  Dependent edema present R flank; surrounding erythema   Musculoskeletal: Normal range of motion. He exhibits edema (2+ LE edema). He exhibits no tenderness.   Neurological: He is alert and oriented to person, place, and time. He has normal reflexes.   Skin: Skin is warm and dry. Capillary refill takes 2 to 3 seconds. He is not diaphoretic. No erythema.   jaundice    Psychiatric: He has a normal mood and affect. His behavior is normal. Judgment and thought content normal. His mood appears not anxious.   Depressed mood   Nursing note and vitals reviewed.      Laboratory:  Immunosuppressants         Stop Route Frequency     cycloSPORINE modified (NEORAL) capsule 100 mg      -- Oral 2 times daily        CBC:     Recent Labs  Lab 01/21/18  0500 01/21/18  0738   WBC 4.85  --    RBC 2.43*  --    HGB 7.6* 7.8*   HCT 22.4* 22.4*     --    MCV 92  --    MCH 31.3*  --    MCHC 33.9  --      CMP:     Recent Labs  Lab 01/21/18  0500   GLU 95   CALCIUM 9.1   ALBUMIN 2.4*   PROT 5.6*      K 3.5   CO2 25      BUN 17   CREATININE 3.2*   ALKPHOS 134   ALT 6*   AST 21   BILITOT 2.6*     Coagulation:   No results for input(s): PT, INR, APTT in the last 168 hours.  Labs within the past 24 hours have been reviewed.    Diagnostic Results:  I have personally reviewed all pertinent imaging studies.    Assessment/Plan:     * Fever    - unclear source of fever, previously thought to be related to peritonitis  - blood cultures prelim NGTD. No urine cx obtained as pt anuric.   - Pt underwent thoracentesis, paracentesis, and drain placement into fluid collection 1/11/18.   - All fluids reviewed and negative for infection. Cultures NGTD.  - ID was following have since signed off. Cefepime d/c 1/15.   - Remains febrile since 1/17. Repeat blood cx NGTD. Resumed Vanc/Cefepime given fever 1/17.  - ID reconsulted --> fungal markers pending (crypto Ag, Aspergillus Ag, Fungitell Assay, Histo Ag)  - CT C/A/P with large R pleural effusion and ascites --> both drained 1/19 and negative for infection          Liver transplanted    - Post op course complicated by fevers and hyperbilirubinemia  - ERCP 12/6 with post-anastomosis stricture with stent placement  - AST/ALT normal. Tbili/alkphos remain elevated  - Liver US 1/8 showed 3.9cm complex fluid collection anterior to right lobe and moderate  nonspecific complex ascites inferior to transplant.   - IR placed drain 1/11/18, cell count negative for infection.  - PBS consulted. PBS not comfortable proceeding with EUS with liver biopsy given fever and possible infectious process going on. ERCP 1/17 with stone and sludge.  Pt placed on Actigall.  - Bili remains elevated.        Long-term use of immunosuppressant medication    - Maintenance IS with cyclosporine. MMF on hold for infections and neutropenia. Continue to check cyclosporine level daily. Assess for toxicity and adjust level as needed.        Prophylactic immunotherapy    - See long term use of immunosuppression.         At risk for opportunistic infections    - Hold Valcyte as WBC low.  CMV PCR 1/11 undetected.  - Continue Bactrim for PCP proph  - isavu for fungal prophylaxis 2/2 complicated post-op course        Constipation    - Enema ordered 1/20 with BM  - Repeat enema today as with signif constipation still on xray  - Encourage ambulation          Other ascites    - Paracentesis performed 1/11/18 with 1600 ml removed.    - fluid negative for infection.   - Repeat para 1/19 negative for infection per cell count        Severe protein-calorie malnutrition    - consult dietician.  - SHANE and tube feeds ordered but not tolerating  -TPN started 1/16/18.         Neutropenia    - Infectious work up negative so far, dc antibiotics 1/15.  - CMV PCR from 1/11/18 not detected. Monitor.  - Neupogen given 1/16/18 with improvement in ANC seen.         Nausea and vomiting    - h/o constipation, reports having regular BM's with bowel regimen  - vomiting episode x 1 at home. Anti-emetics ordered PRN.  - KUB repeated 1/14/18 given increased abdominal pain- mild, generalized bowel distention suggesting ileus.  Diet changed to clears as tolerated for bowel rest   - SHANE tube placed 1/16 for tube feeds. Not tolerating tube feeds, stopped 1/17/18.  - D/C shane 1/19  - chronic constipation, Cont bowel regiment.            Pleural effusion, right    - CXR in ER with large right pleural effusion with subjective c/o worsening SOB  - thoracentesis completed- 1200 ml removed.  SOB with significant improvement.   - Cell count reviewed and negative for infection.   - resp even and non labored.  O2 sat 94-98%.  - Repeat thora 1/19 negative for infection per cell count  - Reaccumulating fluid on xray 1/21  - Repeat HD in AM        Protein-calorie malnutrition    - Poor PO intake since transplant requiring short course of TPN during previous hospital stay.   - albumin remains decreased but appetite slowly improving per pt.   - Dietary consulted. Will need to closely monitor PO intake.   - supplements also ordered.   - SHANE with tube feeding start 1/16. Stopped 1/17/18, was not tolerating. [place] removed 1/20.  - prealbumin 7 on 1/15.   - TPN started 1/16/18.   - Still with signif nausea, will attempt to eat again today as with large BM 1/20        Physical deconditioning    - PT ordered.          Anemia of chronic disease    - PRBC x 1 1/19  - PRBC x 1 ordered today.  - Continue daily CBC        Delayed surgical wound healing    - wd vac removed 1/12/18. Wound care following, recommend dc wound vac. Wound healing well. Aquacel AG and Mepilex border dressing applied via wound care recs.           Acute renal failure with tubular necrosis    - HD resumed on previous admission. Now anuric and dialyzes M-W-F.  - Nephrology following.    - HD Sat since held Friday for thora/para  - Resume MWF schedule            VTE Risk Mitigation         Ordered     heparin (porcine) injection 1,000 Units  As needed (PRN)     Route:  Intra-Catheter        01/12/18 1017     heparin (porcine) injection 5,000 Units  Every 12 hours     Route:  Subcutaneous        01/12/18 0926     Medium Risk of VTE  Once      01/11/18 0351     Place sequential compression device  Until discontinued      01/11/18 0351          The patients clinical status was discussed at  multidisplinary rounds, involving transplant surgery, transplant medicine, pharmacy, nursing, nutrition, and social work    Discharge Planning:  Monitor kidney function --> may need outpt HD  Monitor HH needs vs rehab for deconditioned status      Geronimo Vera PA-C  Liver Transplant  [facility]

## 2018-01-21 NOTE — PROGRESS NOTES
Notified Dr. Landa on call for LTS of patient's c/o pain to right back site from thoracentesis. Area is swollen and tender. Patient reports no relief from 10mg Oxycodone IR administered at 2022. OK per MD to give additional one time dose of 10mg Oxycodone IR at this time. Will continue to monitor patient and notify MD if patient's pain is not relieved.

## 2018-01-21 NOTE — PLAN OF CARE
Problem: Patient Care Overview  Goal: Plan of Care Review  Outcome: Ongoing (interventions implemented as appropriate)  Pt is AAOx4 in bed wearing non-skid footwear, bed in low/locked position and with call bell within reach. Pt reminded to use call bell to call for assistance, pt verbalizes understanding. Mother at bedside. TPN and lipids infusing as ordered. Pt is afebrile at this time. Tmax 99.2 this shift. Proper hand hygiene performed before and after pt care activities. Dressing to chevron CDI. Patient c/o pain to right flank area secondary to thoracentesis. PRN pain medication administered.

## 2018-01-21 NOTE — ASSESSMENT & PLAN NOTE
- Poor PO intake since transplant requiring short course of TPN during previous hospital stay.   - albumin remains decreased but appetite slowly improving per pt.   - Dietary consulted. Will need to closely monitor PO intake.   - supplements also ordered.   - SHANE with tube feeding start 1/16. Stopped 1/17/18, was not tolerating. Shane removed 1/20.  - prealbumin 7 on 1/15.   - TPN started 1/16/18.   - Still with signif nausea, will attempt to eat again today as with large BM 1/20

## 2018-01-21 NOTE — PLAN OF CARE
ID attending     Patient not in room - review of chart and labs shows no significant new findings - fevers seem decreased since re-starting vanc and cefipime - will continue these for today

## 2018-01-21 NOTE — ASSESSMENT & PLAN NOTE
- Post op course complicated by fevers and hyperbilirubinemia  - ERCP 12/6 with post-anastomosis stricture with stent placement  - AST/ALT normal. Tbili/alkphos remain elevated  - Liver US 1/8 showed 3.9cm complex fluid collection anterior to right lobe and moderate nonspecific complex ascites inferior to transplant.   - IR placed drain 1/11/18, cell count negative for infection.  - PBS consulted. PBS not comfortable proceeding with EUS with liver biopsy given fever and possible infectious process going on. ERCP 1/17 with stone and sludge.  Pt placed on Actigall.  - Bili remains elevated.

## 2018-01-21 NOTE — ASSESSMENT & PLAN NOTE
- CXR in ER with large right pleural effusion with subjective c/o worsening SOB  - thoracentesis completed- 1200 ml removed.  SOB with significant improvement.   - Cell count reviewed and negative for infection.   - resp even and non labored.  O2 sat 94-98%.  - Repeat thora 1/19 negative for infection per cell count  - Reaccumulating fluid on xray 1/21  - Repeat HD in AM

## 2018-01-21 NOTE — SUBJECTIVE & OBJECTIVE
Scheduled Meds:   atovaquone  1,500 mg Per NG tube Daily    ceFEPime (MAXIPIME) IVPB  1 g Intravenous Q12H    cycloSPORINE modified  100 mg Oral BID    docusate sodium  100 mg Oral TID PC    epoetin maurisio (PROCRIT) injection  10,000 Units Intravenous Every Mon, Wed, Fri    fat emulsion 20%  250 mL Intravenous Daily    glycerin 99.5%  100 mL Rectal Once    And    magnesium citrate  300 mL Rectal Once    And    sodium chloride 0.9%  500 mL Rectal Once    heparin (porcine)  5,000 Units Subcutaneous Q12H    levETIRAcetam  500 mg Oral BID    levothyroxine  75 mcg Oral Before breakfast    pantoprazole  40 mg Oral Daily    polyethylene glycol  17 g Oral Daily    potassium phosphate IVPB  40 mmol Intravenous Once    ursodiol  300 mg Oral BID     Continuous Infusions:   TPN ADULT CENTRAL LINE CUSTOM 40 mL/hr at 01/20/18 2140     PRN Meds:sodium chloride, sodium chloride 0.9%, sodium chloride 0.9%, sodium chloride 0.9%, acetaminophen, heparin (porcine), omnipaque, ondansetron, ondansetron, oxyCODONE, prochlorperazine, sodium chloride 0.9%    Review of Systems   Constitutional: Positive for activity change, appetite change and fatigue. Negative for chills and fever.   HENT: Negative.    Respiratory: Negative for cough, shortness of breath and wheezing.    Cardiovascular: Positive for leg swelling. Negative for chest pain.   Gastrointestinal: Positive for abdominal distention, abdominal pain, constipation and nausea. Negative for vomiting.   Genitourinary: Negative for difficulty urinating.        Anuric   Musculoskeletal: Negative for arthralgias, back pain and myalgias.   Skin: Positive for wound. Negative for color change and rash.   Allergic/Immunologic: Positive for immunocompromised state.   Neurological: Positive for weakness. Negative for dizziness, seizures and headaches.   Psychiatric/Behavioral: Positive for decreased concentration and dysphoric mood. Negative for confusion. The patient is not  nervous/anxious.    All other systems reviewed and are negative.    Objective:     Vital Signs (Most Recent):  Temp: 98.5 °F (36.9 °C) (01/21/18 0838)  Pulse: 81 (01/21/18 0838)  Resp: 15 (01/21/18 0838)  BP: (!) 99/59 (01/21/18 0838)  SpO2: 97 % (01/21/18 0838) Vital Signs (24h Range):  Temp:  [98.5 °F (36.9 °C)-99.7 °F (37.6 °C)] 98.5 °F (36.9 °C)  Pulse:  [81-94] 81  Resp:  [15-18] 15  SpO2:  [95 %-97 %] 97 %  BP: ()/(59-80) 99/59     Weight: 67.7 kg (149 lb 4 oz)  Body mass index is 24.09 kg/m².    Intake/Output - Last 3 Shifts       01/19 0700 - 01/20 0659 01/20 0700 - 01/21 0659 01/21 0700 - 01/22 0659    P.O. 60 1090     I.V. (mL/kg)  100 (1.5)     Blood 0      Other  600     NG/      IV Piggyback  350     TPN  768.4     Total Intake(mL/kg) 250 (3.7) 2908.4 (43)     Urine (mL/kg/hr) 0 (0) 25 (0)     Emesis/NG output       Other 2700 (1.7) 3100 (1.9)     Stool 0 (0) 0 (0)     Total Output 2700 3125      Net -2450 -216.6             Urine Occurrence 0 x      Stool Occurrence 0 x 1 x           Physical Exam   Constitutional: He is oriented to person, place, and time. He appears well-developed. No distress.   Temporal and distal extremity muscle wasting   HENT:   Head: Normocephalic and atraumatic.   Mouth/Throat: No oropharyngeal exudate.   Eyes: EOM are normal. Pupils are equal, round, and reactive to light. Scleral icterus is present.   Neck: Normal range of motion. Neck supple. No JVD present. No thyromegaly present.   Cardiovascular: Normal rate, regular rhythm, normal heart sounds and intact distal pulses.    No murmur heard.  Pulmonary/Chest: Effort normal. No respiratory distress. He has decreased breath sounds in the right middle field, the right lower field and the left lower field. He has no wheezes. He exhibits no tenderness.   Diminished to RLL   Abdominal: Soft. Bowel sounds are normal. He exhibits distension and ascites. There is tenderness (mild). There is no rebound and no guarding.    Dressing to chevron.  Wd vac removed 1/12/18  Dependent edema present R flank; surrounding erythema   Musculoskeletal: Normal range of motion. He exhibits edema (2+ LE edema). He exhibits no tenderness.   Neurological: He is alert and oriented to person, place, and time. He has normal reflexes.   Skin: Skin is warm and dry. Capillary refill takes 2 to 3 seconds. He is not diaphoretic. No erythema.   jaundice   Psychiatric: He has a normal mood and affect. His behavior is normal. Judgment and thought content normal. His mood appears not anxious.   Depressed mood   Nursing note and vitals reviewed.      Laboratory:  Immunosuppressants         Stop Route Frequency     cycloSPORINE modified (NEORAL) capsule 100 mg      -- Oral 2 times daily        CBC:     Recent Labs  Lab 01/21/18  0500 01/21/18  0738   WBC 4.85  --    RBC 2.43*  --    HGB 7.6* 7.8*   HCT 22.4* 22.4*     --    MCV 92  --    MCH 31.3*  --    MCHC 33.9  --      CMP:     Recent Labs  Lab 01/21/18  0500   GLU 95   CALCIUM 9.1   ALBUMIN 2.4*   PROT 5.6*      K 3.5   CO2 25      BUN 17   CREATININE 3.2*   ALKPHOS 134   ALT 6*   AST 21   BILITOT 2.6*     Coagulation:   No results for input(s): PT, INR, APTT in the last 168 hours.  Labs within the past 24 hours have been reviewed.    Diagnostic Results:  I have personally reviewed all pertinent imaging studies.

## 2018-01-21 NOTE — ASSESSMENT & PLAN NOTE
- Enema ordered 1/20 with BM  - Repeat enema today as with signif constipation still on xray  - Encourage ambulation

## 2018-01-22 LAB
ALBUMIN SERPL BCP-MCNC: 2.1 G/DL
ALP SERPL-CCNC: 149 U/L
ALT SERPL W/O P-5'-P-CCNC: 7 U/L
ANION GAP SERPL CALC-SCNC: 9 MMOL/L
ANISOCYTOSIS BLD QL SMEAR: SLIGHT
AST SERPL-CCNC: 24 U/L
BACTERIA BLD CULT: NORMAL
BACTERIA BLD CULT: NORMAL
BACTERIA FLD AEROBE CULT: NO GROWTH
BACTERIA SPEC AEROBE CULT: NO GROWTH
BASOPHILS # BLD AUTO: ABNORMAL K/UL
BASOPHILS NFR BLD: 0 %
BILIRUB SERPL-MCNC: 2.9 MG/DL
BUN SERPL-MCNC: 26 MG/DL
CALCIUM SERPL-MCNC: 8.9 MG/DL
CHLORIDE SERPL-SCNC: 100 MMOL/L
CMV DNA SPEC QL NAA+PROBE: NOT DETECTED
CO2 SERPL-SCNC: 24 MMOL/L
CREAT SERPL-MCNC: 4 MG/DL
CYCLOSPORINE BLD LC/MS/MS-MCNC: 118 NG/ML
DIFFERENTIAL METHOD: ABNORMAL
EOSINOPHIL # BLD AUTO: ABNORMAL K/UL
EOSINOPHIL NFR BLD: 11 %
ERYTHROCYTE [DISTWIDTH] IN BLOOD BY AUTOMATED COUNT: 17.1 %
EST. GFR  (AFRICAN AMERICAN): 22.1 ML/MIN/1.73 M^2
EST. GFR  (NON AFRICAN AMERICAN): 19.1 ML/MIN/1.73 M^2
GLUCOSE SERPL-MCNC: 86 MG/DL
GRAM STN SPEC: NORMAL
GRAM STN SPEC: NORMAL
HCT VFR BLD AUTO: 24.2 %
HGB BLD-MCNC: 8.2 G/DL
IMM GRANULOCYTES # BLD AUTO: ABNORMAL K/UL
IMM GRANULOCYTES NFR BLD AUTO: ABNORMAL %
LYMPHOCYTES # BLD AUTO: ABNORMAL K/UL
LYMPHOCYTES NFR BLD: 25 %
MAGNESIUM SERPL-MCNC: 2 MG/DL
MCH RBC QN AUTO: 31.2 PG
MCHC RBC AUTO-ENTMCNC: 33.9 G/DL
MCV RBC AUTO: 92 FL
MONOCYTES # BLD AUTO: ABNORMAL K/UL
MONOCYTES NFR BLD: 19 %
MYELOCYTES NFR BLD MANUAL: 1 %
NEUTROPHILS NFR BLD: 43 %
NEUTS BAND NFR BLD MANUAL: 1 %
NRBC BLD-RTO: 0 /100 WBC
OVALOCYTES BLD QL SMEAR: ABNORMAL
PHOSPHATE SERPL-MCNC: 1.6 MG/DL
PLATELET # BLD AUTO: 159 K/UL
PLATELET BLD QL SMEAR: ABNORMAL
PMV BLD AUTO: 11 FL
POLYCHROMASIA BLD QL SMEAR: ABNORMAL
POTASSIUM SERPL-SCNC: 3.7 MMOL/L
PREALB SERPL-MCNC: 11 MG/DL
PROT SERPL-MCNC: 5.2 G/DL
RBC # BLD AUTO: 2.63 M/UL
SMUDGE CELLS BLD QL SMEAR: PRESENT
SODIUM SERPL-SCNC: 133 MMOL/L
SPECIMEN SOURCE: NORMAL
VANCOMYCIN SERPL-MCNC: 28.1 UG/ML
WBC # BLD AUTO: 5.09 K/UL

## 2018-01-22 PROCEDURE — 63600175 PHARM REV CODE 636 W HCPCS: Performed by: PHYSICIAN ASSISTANT

## 2018-01-22 PROCEDURE — 80158 DRUG ASSAY CYCLOSPORINE: CPT

## 2018-01-22 PROCEDURE — 99233 SBSQ HOSP IP/OBS HIGH 50: CPT | Mod: ,,, | Performed by: INTERNAL MEDICINE

## 2018-01-22 PROCEDURE — 84134 ASSAY OF PREALBUMIN: CPT

## 2018-01-22 PROCEDURE — 80053 COMPREHEN METABOLIC PANEL: CPT

## 2018-01-22 PROCEDURE — 86480 TB TEST CELL IMMUN MEASURE: CPT

## 2018-01-22 PROCEDURE — 63600175 PHARM REV CODE 636 W HCPCS: Performed by: NURSE PRACTITIONER

## 2018-01-22 PROCEDURE — 25000003 PHARM REV CODE 250: Performed by: NURSE PRACTITIONER

## 2018-01-22 PROCEDURE — A4217 STERILE WATER/SALINE, 500 ML: HCPCS | Performed by: PHYSICIAN ASSISTANT

## 2018-01-22 PROCEDURE — 25000003 PHARM REV CODE 250: Performed by: PHYSICIAN ASSISTANT

## 2018-01-22 PROCEDURE — 99900035 HC TECH TIME PER 15 MIN (STAT)

## 2018-01-22 PROCEDURE — 84100 ASSAY OF PHOSPHORUS: CPT

## 2018-01-22 PROCEDURE — 20600001 HC STEP DOWN PRIVATE ROOM

## 2018-01-22 PROCEDURE — 85007 BL SMEAR W/DIFF WBC COUNT: CPT

## 2018-01-22 PROCEDURE — 99233 SBSQ HOSP IP/OBS HIGH 50: CPT | Mod: ,,, | Performed by: PHYSICIAN ASSISTANT

## 2018-01-22 PROCEDURE — 85027 COMPLETE CBC AUTOMATED: CPT

## 2018-01-22 PROCEDURE — 83735 ASSAY OF MAGNESIUM: CPT

## 2018-01-22 PROCEDURE — 80202 ASSAY OF VANCOMYCIN: CPT

## 2018-01-22 PROCEDURE — B4185 PARENTERAL SOL 10 GM LIPIDS: HCPCS | Performed by: PHYSICIAN ASSISTANT

## 2018-01-22 RX ORDER — OMEPRAZOLE 40 MG/1
40 CAPSULE, DELAYED RELEASE ORAL
Status: DISCONTINUED | OUTPATIENT
Start: 2018-01-23 | End: 2018-01-25

## 2018-01-22 RX ORDER — SODIUM CHLORIDE 9 MG/ML
INJECTION, SOLUTION INTRAVENOUS
Status: DISCONTINUED | OUTPATIENT
Start: 2018-01-22 | End: 2018-01-26

## 2018-01-22 RX ORDER — SIMETHICONE 80 MG
1 TABLET,CHEWABLE ORAL 3 TIMES DAILY PRN
Status: DISCONTINUED | OUTPATIENT
Start: 2018-01-22 | End: 2018-04-10 | Stop reason: HOSPADM

## 2018-01-22 RX ORDER — SODIUM CHLORIDE 9 MG/ML
INJECTION, SOLUTION INTRAVENOUS ONCE
Status: COMPLETED | OUTPATIENT
Start: 2018-01-22 | End: 2018-01-23

## 2018-01-22 RX ADMIN — CEFEPIME HYDROCHLORIDE 1 G: 1 INJECTION, SOLUTION INTRAVENOUS at 12:01

## 2018-01-22 RX ADMIN — OXYCODONE HYDROCHLORIDE 5 MG: 5 TABLET ORAL at 08:01

## 2018-01-22 RX ADMIN — HEPARIN SODIUM 5000 UNITS: 5000 INJECTION, SOLUTION INTRAVENOUS; SUBCUTANEOUS at 08:01

## 2018-01-22 RX ADMIN — CEFEPIME HYDROCHLORIDE 1 G: 1 INJECTION, SOLUTION INTRAVENOUS at 10:01

## 2018-01-22 RX ADMIN — HEPARIN SODIUM 5000 UNITS: 5000 INJECTION, SOLUTION INTRAVENOUS; SUBCUTANEOUS at 09:01

## 2018-01-22 RX ADMIN — OXYCODONE HYDROCHLORIDE 10 MG: 5 TABLET ORAL at 06:01

## 2018-01-22 RX ADMIN — OXYCODONE HYDROCHLORIDE 5 MG: 5 TABLET ORAL at 10:01

## 2018-01-22 RX ADMIN — CYCLOSPORINE 100 MG: 25 CAPSULE, LIQUID FILLED ORAL at 08:01

## 2018-01-22 RX ADMIN — OXYCODONE HYDROCHLORIDE 5 MG: 5 TABLET ORAL at 11:01

## 2018-01-22 RX ADMIN — RETINOL, ERGOCALCIFEROL, .ALPHA.-TOCOPHEROL ACETATE, DL-, PHYTONADIONE, ASCORBIC ACID, NIACINAMIDE, RIBOFLAVIN 5-PHOSPHATE SODIUM, THIAMINE HYDROCHLORIDE, PYRIDOXINE HYDROCHLORIDE, DEXPANTHENOL, BIOTIN, FOLIC ACID, AND CYANOCOBALAMIN: KIT at 09:01

## 2018-01-22 RX ADMIN — ATOVAQUONE 1500 MG: 750 SUSPENSION ORAL at 08:01

## 2018-01-22 RX ADMIN — URSODIOL 300 MG: 300 CAPSULE ORAL at 09:01

## 2018-01-22 RX ADMIN — OXYCODONE HYDROCHLORIDE 10 MG: 5 TABLET ORAL at 02:01

## 2018-01-22 RX ADMIN — CYCLOSPORINE 150 MG: 100 CAPSULE, LIQUID FILLED ORAL at 06:01

## 2018-01-22 RX ADMIN — URSODIOL 300 MG: 300 CAPSULE ORAL at 08:01

## 2018-01-22 RX ADMIN — POTASSIUM PHOSPHATE, MONOBASIC AND POTASSIUM PHOSPHATE, DIBASIC 30 MMOL: 224; 236 INJECTION, SOLUTION, CONCENTRATE INTRAVENOUS at 08:01

## 2018-01-22 RX ADMIN — OXYCODONE HYDROCHLORIDE 5 MG: 5 TABLET ORAL at 09:01

## 2018-01-22 RX ADMIN — LEVOTHYROXINE SODIUM 75 MCG: 75 TABLET ORAL at 05:01

## 2018-01-22 RX ADMIN — PANTOPRAZOLE SODIUM 40 MG: 40 TABLET, DELAYED RELEASE ORAL at 08:01

## 2018-01-22 RX ADMIN — LEVETIRACETAM 500 MG: 500 TABLET ORAL at 08:01

## 2018-01-22 RX ADMIN — DOCUSATE SODIUM 100 MG: 100 CAPSULE, LIQUID FILLED ORAL at 08:01

## 2018-01-22 RX ADMIN — SOYBEAN OIL 250 ML: 20 INJECTION, SOLUTION INTRAVENOUS at 09:01

## 2018-01-22 RX ADMIN — POLYETHYLENE GLYCOL 3350 17 G: 17 POWDER, FOR SOLUTION ORAL at 08:01

## 2018-01-22 RX ADMIN — LEVETIRACETAM 500 MG: 500 TABLET ORAL at 09:01

## 2018-01-22 RX ADMIN — PROCHLORPERAZINE EDISYLATE 10 MG: 5 INJECTION INTRAMUSCULAR; INTRAVENOUS at 10:01

## 2018-01-22 RX ADMIN — ONDANSETRON HYDROCHLORIDE 4 MG: 2 INJECTION, SOLUTION INTRAMUSCULAR; INTRAVENOUS at 07:01

## 2018-01-22 NOTE — ASSESSMENT & PLAN NOTE
- Paracentesis performed 1/11/18 with 1600 ml removed.    - fluid negative for infection.   - Repeat para 1/19 negative for infection per cell count  - Para Wed 1/24 prior to liver bx

## 2018-01-22 NOTE — ASSESSMENT & PLAN NOTE
- Poor PO intake since transplant requiring short course of TPN during previous hospital stay.   - albumin remains decreased but appetite slowly improving per pt.   - Dietary consulted. Will need to closely monitor PO intake.   - supplements also ordered.   - SHANE with tube feeding start 1/16. Stopped 1/17/18, was not tolerating. Shane removed 1/20.  - prealbumin 7 on 1/15 --> 11/1/22  - TPN started 1/16/18.   - Pt able to eat minimally 1/21 and 1/22 which is an improvement

## 2018-01-22 NOTE — ASSESSMENT & PLAN NOTE
- CXR in ER with large right pleural effusion with subjective c/o worsening SOB  - thoracentesis completed- 1200 ml removed.  SOB with significant improvement.   - Cell count reviewed and negative for infection.   - resp even and non labored.  O2 sat 94-98%.  - Repeat thora 1/19 negative for infection per cell count  - Reaccumulating fluid on xray 1/21

## 2018-01-22 NOTE — ASSESSMENT & PLAN NOTE
- HD resumed on previous admission. Now anuric and dialyzes M-W-F.  - Nephrology following.    - HD Sat since held Friday for thora/para  - HD transitioned to TTS schedule

## 2018-01-22 NOTE — PROGRESS NOTES
[facility]  Infectious Disease  Progress Note    Patient Name: Jhonny Diana  MRN: [id number]  Admission Date: 1/11/2018  Length of Stay: 11 days  Attending Physician: Nathanael Medina MD  Primary Care Provider: Primary Doctor No    Isolation Status: No active isolations  Assessment/Plan:      * Fever    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, CMV D+/R+, steroid induction, on maintenance tacro/MMF/pred; c/b seizures, LAURA, superificial wound infection s/p wound vac to [facility] through 1/12, and several recent admissions on 11/24 and 12/3 with culture negative peritonitis with peak WBC ~5k due to suspected indolent biliary leakage s/p sphincterotomy/biliary stent placement over CBD stricture on 12/6 ERCP although notably with no overt leak noted during procedure) who was admitted on 1/11/2017 with acute onset fevers and acute on chronic N/V/abdominal pain (RLQ worst) in the setting of persistent noninflammatory ascites of unknown etiology. He underwent drainage of pleural fluid and ascites that did not show evidence of gross infection and his fevers initially abated on empiric cefepime that was stopped due to a lack of bacterial process found during evaluation. His fevers and symptoms have recurred off antibiotics and ERCP failed to reveal significant biliary pathology as the cause of his FUO. He remains tenuous and repeat taps have yet to show an etiology of his fevers/FTT.     - would continue empiric vanc/cefepime for now in addition to prophylaxis given possible response to these agents  - await pending pleural/peritoneal fluid cultures - so far NGTD   - await update from [facility] about possible sampling of fluid collection and liver biopsy (with CMV immunostaining)  - await pending fungal markers given FUO (although I do not think he has pneumonia; more likely that his R lung findings are due to compressive atelectasis from his pleural  effusion) -- unremarkable so far            Anticipated Disposition: pending improvement    Thank you for your consult. I will follow-up with patient. Please contact us if you have any additional questions.     Melanie Bergman MD  Transplant ID Attending  [id number]    Melanie Bergman MD  Infectious Disease  [facility]    Subjective:     Principal Problem:Fever    HPI: No notes on file  Interval History: No major change. Afebrile on antibiotics. Bili rising again. Pleural/peritoneal taps without evidence of infection over weekend.    Review of Systems   Constitutional: Negative for fever.   Cardiovascular: Positive for leg swelling.   Gastrointestinal: Positive for abdominal pain. Negative for diarrhea, nausea and vomiting.   Genitourinary:        Anuric     Allergic/Immunologic: Positive for immunocompromised state.     Objective:     Vital Signs (Most Recent):  Temp: 98.7 °F (37.1 °C) (01/22/18 1208)  Pulse: 89 (01/22/18 1208)  Resp: 16 (01/22/18 1208)  BP: 111/74 (01/22/18 1208)  SpO2: 97 % (01/22/18 1208) Vital Signs (24h Range):  Temp:  [98.5 °F (36.9 °C)-99.4 °F (37.4 °C)] 98.7 °F (37.1 °C)  Pulse:  [78-93] 89  Resp:  [16-18] 16  SpO2:  [96 %-97 %] 97 %  BP: (109-127)/(70-83) 111/74     Weight: 70 kg (154 lb 5.2 oz)  Body mass index is 24.91 kg/m².    Estimated Creatinine Clearance: 24.8 mL/min (based on SCr of 4 mg/dL (H)).    Physical Exam   Constitutional: He is oriented to person, place, and time. He appears well-developed. No distress.   Temporal and distal extremity muscle wasting   HENT:   Head: Normocephalic and atraumatic.   Mouth/Throat: No oropharyngeal exudate.   Eyes: EOM are normal. Pupils are equal, round, and reactive to light. No scleral icterus.   Neck: Normal range of motion. Neck supple. No JVD present. No thyromegaly present.   Cardiovascular: Normal rate, regular rhythm, normal heart sounds and intact distal pulses.    No murmur heard.  Pulmonary/Chest: Effort normal.  No respiratory distress. He has decreased breath sounds in the right middle field, the right lower field and the left lower field. He has no wheezes. He exhibits no tenderness.   Diminished to RLL   Abdominal: Soft. Bowel sounds are normal. He exhibits distension and ascites. There is tenderness (mild). There is no rebound and no guarding.   Wound vac removed.   Musculoskeletal: Normal range of motion. He exhibits edema (2+ LE edema). He exhibits no tenderness.   Neurological: He is alert and oriented to person, place, and time. He has normal reflexes.   Skin: Skin is warm and dry. He is not diaphoretic. No erythema.   Psychiatric: He has a normal mood and affect. His behavior is normal. Judgment and thought content normal. His mood appears not anxious.   Nursing note and vitals reviewed.      Significant Labs:   CBC:     Recent Labs  Lab 01/21/18  0500 01/21/18  0738 01/22/18  0430   WBC 4.85  --  5.09   HGB 7.6* 7.8* 8.2*   HCT 22.4* 22.4* 24.2*     --  159     CMP:     Recent Labs  Lab 01/21/18  0500 01/22/18  0430    133*   K 3.5 3.7    100   CO2 25 24   GLU 95 86   BUN 17 26*   CREATININE 3.2* 4.0*   CALCIUM 9.1 8.9   PROT 5.6* 5.2*   ALBUMIN 2.4* 2.1*   BILITOT 2.6* 2.9*   ALKPHOS 134 149*   AST 21 24   ALT 6* 7*   ANIONGAP 9 9   EGFRNONAA 25.0* 19.1*       Significant Imaging: I have reviewed all pertinent imaging results/findings within the past 24 hours.     CT CAP:   Multifocal patchy areas of consolidation in the left lung with a more confluent consolidation or atelectasis in the right lung.   Large right and small left pleural effusions which create local compressive atelectasis.  Simple collection about the inferior margin of the liver similar to the prior ultrasound.  Other incidental findings including biliary stent placement, pneumobilia, cardiomegaly, right and left sided central venous catheters, and body wall anasarca.    Microbiology:  1/11 blood cx: negative  1/11 pleural  fluid cx: negative  1/11 ascites cx: negative  1/17 blood cx: negative  1/18 blood cx: negative  1/19 pleural fluid cx: NGTD  1/19 ascites cx: NGTD

## 2018-01-22 NOTE — PLAN OF CARE
-Pt AAOx4, VSS, SpO- 97% on RA, t max overnight- 99.4, IV cefepime given as scheduled  - TPN @ 40 ml/hr + lipids running at 20.8 ml/hr, tolerating well. R chest wall perm-a-cath dressing changed remains CDI and heparin locked, L IJ triple lumen dressing changed, remains CDI  - Chevron dressing remains CDI, due to be changed Tuesday.   - Abdominal pain controlled with oxycodone 10 mg q4h, received x2 overnight.   - Pt walking around with mother in evening, gas overnight- no BM but does say pain is slightly relieved from brown bomb early in day.   - Bed in lowest position, non skid socks worn, mother to remain at bedside.  - HD not on pt calender but normally goes MWF, will administer AM meds

## 2018-01-22 NOTE — PLAN OF CARE
Problem: Patient Care Overview  Goal: Plan of Care Review  Outcome: Ongoing (interventions implemented as appropriate)  Pt ambulated in halls with mother today. Brown Bomb enema completed with large BM after administration. Pt still complaining of pain. Oxy IR given x 3 this shift. TPN infusing at 40 ml/hr. Mother at bedside throughout shift.

## 2018-01-22 NOTE — PROGRESS NOTES
Attempted to see at pts request for wound care but he is eating .  Offered to come back at a later time.   Zoë Murphy RN CWON  [id number]

## 2018-01-22 NOTE — ASSESSMENT & PLAN NOTE
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+, steroid induction, on maintenance tacro/MMF/pred; c/b seizures, LAURA, superificial wound infection s/p wound vac to [facility] through 1/12, and several recent admissions on 11/24 and 12/3 with culture negative peritonitis with peak WBC ~5k due to suspected indolent biliary leakage s/p sphincterotomy/biliary stent placement over CBD stricture on 12/6 ERCP although notably with no overt leak noted during procedure) who was admitted on 1/11/2017 with acute onset fevers and acute on chronic N/V/abdominal pain (RLQ worst) in the setting of persistent noninflammatory ascites of unknown etiology. He underwent drainage of pleural fluid and ascites that did not show evidence of gross infection and his fevers initially abated on empiric cefepime that was stopped due to a lack of bacterial process found during evaluation. His fevers and symptoms have recurred off antibiotics and ERCP failed to reveal significant biliary pathology as the cause of his FUO. He remains tenuous and repeat taps have yet to show an etiology of his fevers/FTT.     - would continue empiric vanc/cefepime for now in addition to prophylaxis given possible response to these agents  - await pending pleural/peritoneal fluid cultures - so far NGTD   - await update from [facility] about possible sampling of fluid collection and liver biopsy (with CMV immunostaining)  - await pending fungal markers given FUO (although I do not think he has pneumonia; more likely that his R lung findings are due to compressive atelectasis from his pleural effusion) -- unremarkable so far

## 2018-01-22 NOTE — SUBJECTIVE & OBJECTIVE
Scheduled Meds:   atovaquone  1,500 mg Per NG tube Daily    ceFEPime (MAXIPIME) IVPB  1 g Intravenous Q12H    cycloSPORINE modified  150 mg Oral BID    docusate sodium  100 mg Oral TID PC    [START ON 1/25/2018] epoetin maurisio (PROCRIT) injection  10,000 Units Intravenous Every Tues, Thurs, Sat    fat emulsion 20%  250 mL Intravenous Daily    heparin (porcine)  5,000 Units Subcutaneous Q12H    levETIRAcetam  500 mg Oral BID    levothyroxine  75 mcg Oral Before breakfast    [START ON 1/23/2018] omeprazole  40 mg Oral Before breakfast    polyethylene glycol  17 g Oral Daily    ursodiol  300 mg Oral BID     Continuous Infusions:   TPN ADULT CENTRAL LINE CUSTOM 40 mL/hr at 01/21/18 2056    TPN ADULT CENTRAL LINE CUSTOM       PRN Meds:sodium chloride, sodium chloride 0.9%, sodium chloride 0.9%, sodium chloride 0.9%, acetaminophen, heparin (porcine), omnipaque, ondansetron, ondansetron, oxyCODONE, oxyCODONE, prochlorperazine, simethicone, sodium chloride 0.9%    Review of Systems   Constitutional: Positive for activity change, appetite change and fatigue. Negative for chills and fever.   HENT: Negative.    Respiratory: Negative for cough, shortness of breath and wheezing.    Cardiovascular: Positive for leg swelling. Negative for chest pain.   Gastrointestinal: Positive for abdominal distention, abdominal pain and constipation. Negative for nausea and vomiting.   Genitourinary: Negative for difficulty urinating.        Anuric   Musculoskeletal: Negative for arthralgias, back pain and myalgias.   Skin: Positive for wound. Negative for color change and rash.   Allergic/Immunologic: Positive for immunocompromised state.   Neurological: Positive for weakness. Negative for dizziness, seizures and headaches.   Psychiatric/Behavioral: Positive for decreased concentration and dysphoric mood. Negative for confusion. The patient is not nervous/anxious.    All other systems reviewed and are negative.    Objective:      Vital Signs (Most Recent):  Temp: 98.7 °F (37.1 °C) (01/22/18 1208)  Pulse: 89 (01/22/18 1208)  Resp: 16 (01/22/18 1208)  BP: 111/74 (01/22/18 1208)  SpO2: 97 % (01/22/18 1208) Vital Signs (24h Range):  Temp:  [98.5 °F (36.9 °C)-99.4 °F (37.4 °C)] 98.7 °F (37.1 °C)  Pulse:  [78-93] 89  Resp:  [16-18] 16  SpO2:  [96 %-97 %] 97 %  BP: (109-127)/(70-83) 111/74     Weight: 70 kg (154 lb 5.2 oz)  Body mass index is 24.91 kg/m².    Intake/Output - Last 3 Shifts       01/20 0700 - 01/21 0659 01/21 0700 - 01/22 0659 01/22 0700 - 01/23 0659    P.O. 1090 100     I.V. (mL/kg) 100 (1.5)      Blood       Other 600      NG/GT       IV Piggyback 350 650     .4      Total Intake(mL/kg) 2908.4 (43) 750 (10.7)     Urine (mL/kg/hr) 25 (0) 0 (0)     Emesis/NG output  0 (0)     Other 3100 (1.9) 0 (0)     Stool 0 (0) 0 (0)     Blood  0 (0)     Total Output 3125 0      Net -216.6 +750             Urine Occurrence  0 x     Stool Occurrence 1 x 1 x     Emesis Occurrence  0 x           Physical Exam   Constitutional: He is oriented to person, place, and time. He appears well-developed. No distress.   Temporal and distal extremity muscle wasting   HENT:   Head: Normocephalic and atraumatic.   Mouth/Throat: No oropharyngeal exudate.   Eyes: EOM are normal. Pupils are equal, round, and reactive to light. Scleral icterus is present.   Neck: Normal range of motion. Neck supple. No JVD present. No thyromegaly present.   Cardiovascular: Normal rate, regular rhythm, normal heart sounds and intact distal pulses.    No murmur heard.  Pulmonary/Chest: Effort normal. No respiratory distress. He has decreased breath sounds in the right middle field, the right lower field and the left lower field. He has no wheezes. He exhibits no tenderness.   Diminished to RLL   Abdominal: Soft. Bowel sounds are normal. He exhibits distension and ascites. There is tenderness (mild). There is no rebound and no guarding.   Dressing to chevron.  Wd vac removed  1/12/18  Dependent edema present R flank; surrounding erythema   Musculoskeletal: Normal range of motion. He exhibits edema (2+ LE edema). He exhibits no tenderness.   Neurological: He is alert and oriented to person, place, and time. He has normal reflexes.   Skin: Skin is warm and dry. Capillary refill takes 2 to 3 seconds. He is not diaphoretic. No erythema.   jaundice   Psychiatric: He has a normal mood and affect. His behavior is normal. Judgment and thought content normal. His mood appears not anxious.   Depressed mood   Nursing note and vitals reviewed.      Laboratory:  Immunosuppressants         Stop Route Frequency     cycloSPORINE modified capsule 150 mg      -- Oral 2 times daily        CBC:     Recent Labs  Lab 01/22/18  0430   WBC 5.09   RBC 2.63*   HGB 8.2*   HCT 24.2*      MCV 92   MCH 31.2*   MCHC 33.9     CMP:     Recent Labs  Lab 01/22/18  0430   GLU 86   CALCIUM 8.9   ALBUMIN 2.1*   PROT 5.2*   *   K 3.7   CO2 24      BUN 26*   CREATININE 4.0*   ALKPHOS 149*   ALT 7*   AST 24   BILITOT 2.9*     Coagulation:   No results for input(s): PT, INR, APTT in the last 168 hours.  Labs within the past 24 hours have been reviewed.    Diagnostic Results:  I have personally reviewed all pertinent imaging studies.

## 2018-01-22 NOTE — PLAN OF CARE
"Problem: Patient Care Overview  Goal: Plan of Care Review  Outcome: Ongoing (interventions implemented as appropriate)  - Dressing change by Wound care, healing appropriately  - TPN 40 mL/hr continuous, Lipids nightly  - HD scheduled tomorrow  - Expect biopsy of liver tomorrow after HD - NPO past midnight  - Walk 4x daily with mother in halls  - Regular diet, encourage nutrition.  Ate 50% of lunch (largest meal "in a long time")  - Phos replaced IV  - Cefipime & Vanc IV, oral prophylactics  - Bed low & locked, call light in reach, nonslip socks in use, calls for assistance, mother at bedside  - Oxycodone 10 mg for pain with moderate relief      "

## 2018-01-22 NOTE — ASSESSMENT & PLAN NOTE
- Post op course complicated by fevers and hyperbilirubinemia  - ERCP 12/6 with post-anastomosis stricture with stent placement  - AST/ALT normal. Tbili/alkphos remain elevated  - Liver US 1/8 showed 3.9cm complex fluid collection anterior to right lobe and moderate nonspecific complex ascites inferior to transplant.   - IR placed drain 1/11/18, cell count negative for infection.  - PBS consulted. PBS not comfortable proceeding with EUS with liver biopsy given fever and possible infectious process going on. ERCP 1/17 with stone and sludge.  Pt placed on Actigall.  - Bili remains elevated.  - Transplant bx in AM

## 2018-01-22 NOTE — PROGRESS NOTES
[facility]  Liver Transplant  Progress Note    Patient Name: Jhonny Diana  MRN: [id number]  Admission Date: 2018  Hospital Length of Stay: 11 days  Code Status: Full Code  Primary Care Provider: Primary Doctor No  Post-Operative Day: 95    ORGAN:   LIVER  Disease Etiology: Acute Alcoholic Hepatitis  Donor Type:    - Brain Death  CDC High Risk:   No  Donor CMV Status:   Donor CMV Status: Positive  Donor HBcAB:   Negative  Donor HCV Status:   Negative  Whole or Partial: Whole Liver  Biliary Anastomosis: End to End  Arterial Anatomy: Standard  Subjective:     History of Present Illness:  Jhonny Diana is a 26 y/o male with past medical history of alcoholic cirrhosis.  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 Found to have peritonitis in November (WBC 5000, 75% PNM) neg for bile leak. He was treated initially with vanc/cefepime. Repeat cell counts  with some improvement (WBC 1400, 45% PNM). He has undergone multiple paracenteses as well as abscess drainage of perihepatic fluid collections and treated with antimicrobial therapy but no positive cultures. Of note, biliary stricture also identified and ERCP performed on 2017 with sphincterotomy and biliary stent placed. Liver tests still have not normalized despite intervention, bilirubin and AP remain elevated. Other pertinent PMH current wound vac in place 2/2 wound infection, malnutrition requiring TPN for short course and ongoing hypoalbuminemia, and seizure activity while on prograf and has since been switched to cyclosporine without reoccurrence.  He presented to the ER for fever, abdominal pain, and N/V. He reports fever (103) for 1  day prior. Overnight, he developed N/V, reports small amount of green emesis with new left sided pain. He also endorses worsening SOB with exertion. He was scheduled as an outpatient for follow up paracentesis and IR drainage of fluid collection. CXR in ER shows large pleural effusion with subsegmental atelectasis. Infectious work up initiated in ER. His ANC is 900. Broad spectrum antibiotics initiated in ED. He denies chest pain, palpitations, diarrhea, constipation, or back pain. Denies any sick contacts.    Hospital Course:  Thoracentesis (1.2L off), Paracentesis (1.6L off), and IR drainage of fluid collection 1/11, all fluids negative for infection. ID consulted. Broad spectrum antibiotics d/c'd 1/15. Chronically malnourished with poor PO intake, prealbumin 7. Alex tube placed 1/15 for tube feedings.     ERCP 1/17 with sludge and a biliary stone which was removed and stent exchanged.  Remained with n/v; therefore, TF remained on hold and TPN continued.   Pt with fever s/p ERCP on 1/17 which continued until 1/19.  Vanc/cefepime restarted.  Blood cx 1/17 and 1/18 NGTD.  ID reconsulted.  Fungal markers sent.  CT C/A/P obtained.  With large R pleural effusion and ascites- both drained 1/19 and negative for infx per cell count. Pt afebrile 1/20.     Interval History: Nausea improved.  Reports continued abd fullness limiting appetite.  Able to eat turkey sandwich yesterday and red beans today for lunch.  Large BM Sat and Sun with enema.  Continue TPN.  Encouraged ambulation at least 4 times today.  Spirits improved today.  Plan for liver bx with cultures and CMV staining- unable to do today secondary to ascites.  Plan to do HD tomorrow, para and bx Wed?    Scheduled Meds:   atovaquone  1,500 mg Per NG tube Daily    ceFEPime (MAXIPIME) IVPB  1 g Intravenous Q12H    cycloSPORINE modified  150 mg Oral BID    docusate sodium  100 mg Oral TID PC    [START ON 1/25/2018] epoetin maurisio (PROCRIT) injection  10,000 Units  Intravenous Every Tues, Thurs, Sat    fat emulsion 20%  250 mL Intravenous Daily    heparin (porcine)  5,000 Units Subcutaneous Q12H    levETIRAcetam  500 mg Oral BID    levothyroxine  75 mcg Oral Before breakfast    [START ON 1/23/2018] omeprazole  40 mg Oral Before breakfast    polyethylene glycol  17 g Oral Daily    ursodiol  300 mg Oral BID     Continuous Infusions:   TPN ADULT CENTRAL LINE CUSTOM 40 mL/hr at 01/21/18 2056    TPN ADULT CENTRAL LINE CUSTOM       PRN Meds:sodium chloride, sodium chloride 0.9%, sodium chloride 0.9%, sodium chloride 0.9%, acetaminophen, heparin (porcine), omnipaque, ondansetron, ondansetron, oxyCODONE, oxyCODONE, prochlorperazine, simethicone, sodium chloride 0.9%    Review of Systems   Constitutional: Positive for activity change, appetite change and fatigue. Negative for chills and fever.   HENT: Negative.    Respiratory: Negative for cough, shortness of breath and wheezing.    Cardiovascular: Positive for leg swelling. Negative for chest pain.   Gastrointestinal: Positive for abdominal distention, abdominal pain and constipation. Negative for nausea and vomiting.   Genitourinary: Negative for difficulty urinating.        Anuric   Musculoskeletal: Negative for arthralgias, back pain and myalgias.   Skin: Positive for wound. Negative for color change and rash.   Allergic/Immunologic: Positive for immunocompromised state.   Neurological: Positive for weakness. Negative for dizziness, seizures and headaches.   Psychiatric/Behavioral: Positive for decreased concentration and dysphoric mood. Negative for confusion. The patient is not nervous/anxious.    All other systems reviewed and are negative.    Objective:     Vital Signs (Most Recent):  Temp: 98.7 °F (37.1 °C) (01/22/18 1208)  Pulse: 89 (01/22/18 1208)  Resp: 16 (01/22/18 1208)  BP: 111/74 (01/22/18 1208)  SpO2: 97 % (01/22/18 1208) Vital Signs (24h Range):  Temp:  [98.5 °F (36.9 °C)-99.4 °F (37.4 °C)] 98.7 °F (37.1  °C)  Pulse:  [78-93] 89  Resp:  [16-18] 16  SpO2:  [96 %-97 %] 97 %  BP: (109-127)/(70-83) 111/74     Weight: 70 kg (154 lb 5.2 oz)  Body mass index is 24.91 kg/m².    Intake/Output - Last 3 Shifts       01/20 0700 - 01/21 0659 01/21 0700 - 01/22 0659 01/22 0700 - 01/23 0659    P.O. 1090 100     I.V. (mL/kg) 100 (1.5)      Blood       Other 600      NG/GT       IV Piggyback 350 650     .4      Total Intake(mL/kg) 2908.4 (43) 750 (10.7)     Urine (mL/kg/hr) 25 (0) 0 (0)     Emesis/NG output  0 (0)     Other 3100 (1.9) 0 (0)     Stool 0 (0) 0 (0)     Blood  0 (0)     Total Output 3125 0      Net -216.6 +750             Urine Occurrence  0 x     Stool Occurrence 1 x 1 x     Emesis Occurrence  0 x           Physical Exam   Constitutional: He is oriented to person, place, and time. He appears well-developed. No distress.   Temporal and distal extremity muscle wasting   HENT:   Head: Normocephalic and atraumatic.   Mouth/Throat: No oropharyngeal exudate.   Eyes: EOM are normal. Pupils are equal, round, and reactive to light. Scleral icterus is present.   Neck: Normal range of motion. Neck supple. No JVD present. No thyromegaly present.   Cardiovascular: Normal rate, regular rhythm, normal heart sounds and intact distal pulses.    No murmur heard.  Pulmonary/Chest: Effort normal. No respiratory distress. He has decreased breath sounds in the right middle field, the right lower field and the left lower field. He has no wheezes. He exhibits no tenderness.   Diminished to RLL   Abdominal: Soft. Bowel sounds are normal. He exhibits distension and ascites. There is tenderness (mild). There is no rebound and no guarding.   Dressing to chevron.  Wd vac removed 1/12/18  Dependent edema present R flank; surrounding erythema   Musculoskeletal: Normal range of motion. He exhibits edema (2+ LE edema). He exhibits no tenderness.   Neurological: He is alert and oriented to person, place, and time. He has normal reflexes.   Skin:  Skin is warm and dry. Capillary refill takes 2 to 3 seconds. He is not diaphoretic. No erythema.   jaundice   Psychiatric: He has a normal mood and affect. His behavior is normal. Judgment and thought content normal. His mood appears not anxious.   Depressed mood   Nursing note and vitals reviewed.      Laboratory:  Immunosuppressants         Stop Route Frequency     cycloSPORINE modified capsule 150 mg      -- Oral 2 times daily        CBC:     Recent Labs  Lab 01/22/18  0430   WBC 5.09   RBC 2.63*   HGB 8.2*   HCT 24.2*      MCV 92   MCH 31.2*   MCHC 33.9     CMP:     Recent Labs  Lab 01/22/18  0430   GLU 86   CALCIUM 8.9   ALBUMIN 2.1*   PROT 5.2*   *   K 3.7   CO2 24      BUN 26*   CREATININE 4.0*   ALKPHOS 149*   ALT 7*   AST 24   BILITOT 2.9*     Coagulation:   No results for input(s): PT, INR, APTT in the last 168 hours.  Labs within the past 24 hours have been reviewed.    Diagnostic Results:  I have personally reviewed all pertinent imaging studies.    Assessment/Plan:     * Fever    - unclear source of fever, previously thought to be related to peritonitis  - blood cultures prelim NGTD. No urine cx obtained as pt anuric.   - Pt underwent thoracentesis, paracentesis, and drain placement into fluid collection 1/11/18.   - All fluids reviewed and negative for infection. Cultures NGTD.  - ID was following have since signed off. Cefepime d/c 1/15.   - Remains febrile since 1/17. Repeat blood cx NGTD. Resumed Vanc/Cefepime given fever 1/17.  - ID reconsulted --> fungal markers pending (crypto Ag, Aspergillus Ag, Fungitell Assay, Histo Ag)  - CT C/A/P with large R pleural effusion and ascites --> both drained 1/19 and negative for infection  - Plan to bx Wed s/p para and send for cx/CMV staining        Liver transplanted    - Post op course complicated by fevers and hyperbilirubinemia  - ERCP 12/6 with post-anastomosis stricture with stent placement  - AST/ALT normal. Tbili/alkphos remain  elevated  - Liver US 1/8 showed 3.9cm complex fluid collection anterior to right lobe and moderate nonspecific complex ascites inferior to transplant.   - IR placed drain 1/11/18, cell count negative for infection.  - PBS consulted. PBS not comfortable proceeding with EUS with liver biopsy given fever and possible infectious process going on. ERCP 1/17 with stone and sludge.  Pt placed on Actigall.  - Bili remains elevated.  - Transplant bx in AM        Long-term use of immunosuppressant medication    - Maintenance IS with cyclosporine. MMF on hold for infections and neutropenia. Continue to check cyclosporine level daily. Assess for toxicity and adjust level as needed.        Prophylactic immunotherapy    - See long term use of immunosuppression.         At risk for opportunistic infections    - Hold Valcyte as WBC low.  CMV PCR 1/11 undetected.  - Continue Bactrim for PCP proph  - isavu for fungal prophylaxis 2/2 complicated post-op course        Constipation    - Enema ordered 1/20 and 1/21 with BM  - Encourage ambulation          Other ascites    - Paracentesis performed 1/11/18 with 1600 ml removed.    - fluid negative for infection.   - Repeat para 1/19 negative for infection per cell count  - Para Wed 1/24 prior to liver bx        Severe protein-calorie malnutrition    - Poor PO intake since transplant requiring short course of TPN during previous hospital stay.   - albumin remains decreased but appetite slowly improving per pt.   - Dietary consulted. Will need to closely monitor PO intake.   - supplements also ordered.   - SHANE with tube feeding start 1/16. Stopped 1/17/18, was not tolerating. Garland removed 1/20.  - prealbumin 7 on 1/15 --> 11/1/22  - TPN started 1/16/18.   - Pt able to eat minimally 1/21 and 1/22 which is an improvement        Neutropenia    - Infectious work up negative so far, dc antibiotics 1/15.  - CMV PCR from 1/11/18 not detected. Monitor.  - Neupogen given 1/16/18 with improvement in  ANC seen.         Nausea and vomiting    - h/o constipation, reports having regular BM's with bowel regimen  - vomiting episode x 1 at home. Anti-emetics ordered PRN.  - KUB repeated 1/14/18 given increased abdominal pain- mild, generalized bowel distention suggesting ileus.  Diet changed to clears as tolerated for bowel rest   - SHANE tube placed 1/16 for tube feeds. Not tolerating tube feeds, stopped 1/17/18.  - D/C shane 1/19  - chronic constipation, Cont bowel regiment.           Pleural effusion, right    - CXR in ER with large right pleural effusion with subjective c/o worsening SOB  - thoracentesis completed- 1200 ml removed.  SOB with significant improvement.   - Cell count reviewed and negative for infection.   - resp even and non labored.  O2 sat 94-98%.  - Repeat thora 1/19 negative for infection per cell count  - Reaccumulating fluid on xray 1/21        Physical deconditioning    - PT ordered.          Anemia of chronic disease    - PRBC x 1 1/19  - PRBC x 1 ordered today.  - Continue daily CBC        Delayed surgical wound healing    - wd vac removed 1/12/18. Wound care following, recommend dc wound vac. Wound healing well. Aquacel AG and Mepilex border dressing applied via wound care recs.           Acute renal failure with tubular necrosis    - HD resumed on previous admission. Now anuric and dialyzes M-W-F.  - Nephrology following.    - HD Sat since held Friday for thora/para  - HD transitioned to TTS schedule            VTE Risk Mitigation         Ordered     heparin (porcine) injection 1,000 Units  As needed (PRN)     Route:  Intra-Catheter        01/12/18 1017     heparin (porcine) injection 5,000 Units  Every 12 hours     Route:  Subcutaneous        01/12/18 0926     Medium Risk of VTE  Once      01/11/18 0351     Place sequential compression device  Until discontinued      01/11/18 0351          The patients clinical status was discussed at multidisplinary rounds, involving transplant surgery,  transplant medicine, pharmacy, nursing, nutrition, and social work    Discharge Planning:  Monitor kidney function --> may need outpt HD  Monitor HH needs vs rehab for deconditioned status      Geronimo Vera PA-C  Liver Transplant  [facility]

## 2018-01-22 NOTE — PROGRESS NOTES
[facility] Pt c/o itching to newly epitheliazed skin.     01/22/18 1700       Incision/Site 10/30/17 0911 abdomen transverse   Date First Assessed/Time First Assessed: 10/30/17 0911   Present Prior to Hospital Arrival?: Yes  Location: abdomen  Orientation: transverse   Wound Image    Incision WDL ex   Dressing Appearance Dry;Intact   Drainage Amount Scant   Drainage Characteristics/Odor Serous   Appearance Pink;Moist   Red (%), Wound Tissue Color 100 %  (pink)   Periwound Area Intact   Wound Edges Open   Wound Length (cm) 1.2   Wound Width (cm) 11   Depth (cm) 0.1   Care Cleansed with:;Sterile normal saline   Dressing Applied;Foam     Rest of incision has resolved and Sween moisturizer applied .   Covered to protect from self inflicting injury caused by scratching.   Zoë Murphy RN CWON  [id number]

## 2018-01-22 NOTE — SUBJECTIVE & OBJECTIVE
Interval History: No major change. Afebrile on antibiotics. Bili rising again. Pleural/peritoneal taps without evidence of infection over weekend.    Review of Systems   Constitutional: Negative for fever.   Cardiovascular: Positive for leg swelling.   Gastrointestinal: Positive for abdominal pain. Negative for diarrhea, nausea and vomiting.   Genitourinary:        Anuric     Allergic/Immunologic: Positive for immunocompromised state.     Objective:     Vital Signs (Most Recent):  Temp: 98.7 °F (37.1 °C) (01/22/18 1208)  Pulse: 89 (01/22/18 1208)  Resp: 16 (01/22/18 1208)  BP: 111/74 (01/22/18 1208)  SpO2: 97 % (01/22/18 1208) Vital Signs (24h Range):  Temp:  [98.5 °F (36.9 °C)-99.4 °F (37.4 °C)] 98.7 °F (37.1 °C)  Pulse:  [78-93] 89  Resp:  [16-18] 16  SpO2:  [96 %-97 %] 97 %  BP: (109-127)/(70-83) 111/74     Weight: 70 kg (154 lb 5.2 oz)  Body mass index is 24.91 kg/m².    Estimated Creatinine Clearance: 24.8 mL/min (based on SCr of 4 mg/dL (H)).    Physical Exam   Constitutional: He is oriented to person, place, and time. He appears well-developed. No distress.   Temporal and distal extremity muscle wasting   HENT:   Head: Normocephalic and atraumatic.   Mouth/Throat: No oropharyngeal exudate.   Eyes: EOM are normal. Pupils are equal, round, and reactive to light. No scleral icterus.   Neck: Normal range of motion. Neck supple. No JVD present. No thyromegaly present.   Cardiovascular: Normal rate, regular rhythm, normal heart sounds and intact distal pulses.    No murmur heard.  Pulmonary/Chest: Effort normal. No respiratory distress. He has decreased breath sounds in the right middle field, the right lower field and the left lower field. He has no wheezes. He exhibits no tenderness.   Diminished to RLL   Abdominal: Soft. Bowel sounds are normal. He exhibits distension and ascites. There is tenderness (mild). There is no rebound and no guarding.   Wound vac removed.   Musculoskeletal: Normal range of motion. He  exhibits edema (2+ LE edema). He exhibits no tenderness.   Neurological: He is alert and oriented to person, place, and time. He has normal reflexes.   Skin: Skin is warm and dry. He is not diaphoretic. No erythema.   Psychiatric: He has a normal mood and affect. His behavior is normal. Judgment and thought content normal. His mood appears not anxious.   Nursing note and vitals reviewed.      Significant Labs:   CBC:     Recent Labs  Lab 01/21/18  0500 01/21/18  0738 01/22/18  0430   WBC 4.85  --  5.09   HGB 7.6* 7.8* 8.2*   HCT 22.4* 22.4* 24.2*     --  159     CMP:     Recent Labs  Lab 01/21/18  0500 01/22/18  0430    133*   K 3.5 3.7    100   CO2 25 24   GLU 95 86   BUN 17 26*   CREATININE 3.2* 4.0*   CALCIUM 9.1 8.9   PROT 5.6* 5.2*   ALBUMIN 2.4* 2.1*   BILITOT 2.6* 2.9*   ALKPHOS 134 149*   AST 21 24   ALT 6* 7*   ANIONGAP 9 9   EGFRNONAA 25.0* 19.1*       Significant Imaging: I have reviewed all pertinent imaging results/findings within the past 24 hours.     CT CAP:   Multifocal patchy areas of consolidation in the left lung with a more confluent consolidation or atelectasis in the right lung.   Large right and small left pleural effusions which create local compressive atelectasis.  Simple collection about the inferior margin of the liver similar to the prior ultrasound.  Other incidental findings including biliary stent placement, pneumobilia, cardiomegaly, right and left sided central venous catheters, and body wall anasarca.    Microbiology:  1/11 blood cx: negative  1/11 pleural fluid cx: negative  1/11 ascites cx: negative  1/17 blood cx: negative  1/18 blood cx: negative  1/19 pleural fluid cx: NGTD  1/19 ascites cx: NGTD

## 2018-01-22 NOTE — ASSESSMENT & PLAN NOTE
- unclear source of fever, previously thought to be related to peritonitis  - blood cultures prelim NGTD. No urine cx obtained as pt anuric.   - Pt underwent thoracentesis, paracentesis, and drain placement into fluid collection 1/11/18.   - All fluids reviewed and negative for infection. Cultures NGTD.  - ID was following have since signed off. Cefepime d/c 1/15.   - Remains febrile since 1/17. Repeat blood cx NGTD. Resumed Vanc/Cefepime given fever 1/17.  - ID reconsulted --> fungal markers pending (crypto Ag, Aspergillus Ag, Fungitell Assay, Histo Ag)  - CT C/A/P with large R pleural effusion and ascites --> both drained 1/19 and negative for infection  - Plan to bx Wed s/p para and send for cx/CMV staining

## 2018-01-22 NOTE — PROGRESS NOTES
Off of the floor in Interventional Radiology   If time allows, will pass by later to assess his wound.   Zoë Murphy RN CWON  [id number]

## 2018-01-23 LAB
1,3 BETA GLUCAN SPEC-MCNC: <31 PG/ML
ALBUMIN SERPL BCP-MCNC: 2 G/DL
ALP SERPL-CCNC: 154 U/L
ALT SERPL W/O P-5'-P-CCNC: 6 U/L
ANION GAP SERPL CALC-SCNC: 11 MMOL/L
ANISOCYTOSIS BLD QL SMEAR: SLIGHT
AST SERPL-CCNC: 24 U/L
BACTERIA BLD CULT: NORMAL
BACTERIA BLD CULT: NORMAL
BASOPHILS # BLD AUTO: ABNORMAL K/UL
BASOPHILS NFR BLD: 0 %
BILIRUB SERPL-MCNC: 2.8 MG/DL
BUN SERPL-MCNC: 33 MG/DL
CALCIUM SERPL-MCNC: 9.2 MG/DL
CHLORIDE SERPL-SCNC: 96 MMOL/L
CO2 SERPL-SCNC: 24 MMOL/L
CREAT SERPL-MCNC: 4.7 MG/DL
CYCLOSPORINE BLD LC/MS/MS-MCNC: 235 NG/ML
DACRYOCYTES BLD QL SMEAR: ABNORMAL
DIFFERENTIAL METHOD: ABNORMAL
EOSINOPHIL # BLD AUTO: ABNORMAL K/UL
EOSINOPHIL NFR BLD: 11 %
ERYTHROCYTE [DISTWIDTH] IN BLOOD BY AUTOMATED COUNT: 16.5 %
EST. GFR  (AFRICAN AMERICAN): 18.2 ML/MIN/1.73 M^2
EST. GFR  (NON AFRICAN AMERICAN): 15.7 ML/MIN/1.73 M^2
GALACTOMANNAN AG SERPL IA-ACNC: <0.5 INDEX
GLUCOSE SERPL-MCNC: 90 MG/DL
HCT VFR BLD AUTO: 22.7 %
HGB BLD-MCNC: 7.9 G/DL
HYPOCHROMIA BLD QL SMEAR: ABNORMAL
IMM GRANULOCYTES # BLD AUTO: ABNORMAL K/UL
IMM GRANULOCYTES NFR BLD AUTO: ABNORMAL %
LYMPHOCYTES # BLD AUTO: ABNORMAL K/UL
LYMPHOCYTES NFR BLD: 18 %
MAGNESIUM SERPL-MCNC: 1.8 MG/DL
MCH RBC QN AUTO: 31.5 PG
MCHC RBC AUTO-ENTMCNC: 34.8 G/DL
MCV RBC AUTO: 90 FL
METAMYELOCYTES NFR BLD MANUAL: 2 %
MITOGEN NIL: 1.17 IU/ML
MONOCYTES # BLD AUTO: ABNORMAL K/UL
MONOCYTES NFR BLD: 16 %
NEUTROPHILS NFR BLD: 53 %
NIL: 0.04 IU/ML
NRBC BLD-RTO: 0 /100 WBC
OVALOCYTES BLD QL SMEAR: ABNORMAL
PHOSPHATE SERPL-MCNC: 3.8 MG/DL
PLATELET # BLD AUTO: 159 K/UL
PLATELET BLD QL SMEAR: ABNORMAL
PMV BLD AUTO: 10.4 FL
POIKILOCYTOSIS BLD QL SMEAR: SLIGHT
POLYCHROMASIA BLD QL SMEAR: ABNORMAL
POTASSIUM SERPL-SCNC: 4.2 MMOL/L
PROT SERPL-MCNC: 5.3 G/DL
RBC # BLD AUTO: 2.51 M/UL
SODIUM SERPL-SCNC: 131 MMOL/L
TB ANTIGEN NIL: -0 IU/ML
TB ANTIGEN: 0.04 IU/ML
TB GOLD: NEGATIVE
VANCOMYCIN SERPL-MCNC: 17 UG/ML
WBC # BLD AUTO: 4.84 K/UL

## 2018-01-23 PROCEDURE — 63600175 PHARM REV CODE 636 W HCPCS: Performed by: NURSE PRACTITIONER

## 2018-01-23 PROCEDURE — 90935 HEMODIALYSIS ONE EVALUATION: CPT | Mod: ,,, | Performed by: INTERNAL MEDICINE

## 2018-01-23 PROCEDURE — 25000003 PHARM REV CODE 250: Performed by: NURSE PRACTITIONER

## 2018-01-23 PROCEDURE — 63600175 PHARM REV CODE 636 W HCPCS: Performed by: RADIOLOGY

## 2018-01-23 PROCEDURE — 99233 SBSQ HOSP IP/OBS HIGH 50: CPT | Mod: ,,, | Performed by: NURSE PRACTITIONER

## 2018-01-23 PROCEDURE — 20600001 HC STEP DOWN PRIVATE ROOM

## 2018-01-23 PROCEDURE — 88307 TISSUE EXAM BY PATHOLOGIST: CPT | Performed by: PATHOLOGY

## 2018-01-23 PROCEDURE — 83735 ASSAY OF MAGNESIUM: CPT

## 2018-01-23 PROCEDURE — 88307 TISSUE EXAM BY PATHOLOGIST: CPT | Mod: 26,,, | Performed by: PATHOLOGY

## 2018-01-23 PROCEDURE — A4217 STERILE WATER/SALINE, 500 ML: HCPCS | Performed by: NURSE PRACTITIONER

## 2018-01-23 PROCEDURE — 80202 ASSAY OF VANCOMYCIN: CPT

## 2018-01-23 PROCEDURE — 84100 ASSAY OF PHOSPHORUS: CPT

## 2018-01-23 PROCEDURE — 25000003 PHARM REV CODE 250: Performed by: RADIOLOGY

## 2018-01-23 PROCEDURE — 80158 DRUG ASSAY CYCLOSPORINE: CPT

## 2018-01-23 PROCEDURE — 63600175 PHARM REV CODE 636 W HCPCS: Performed by: HOSPITALIST

## 2018-01-23 PROCEDURE — 63600175 PHARM REV CODE 636 W HCPCS: Performed by: STUDENT IN AN ORGANIZED HEALTH CARE EDUCATION/TRAINING PROGRAM

## 2018-01-23 PROCEDURE — 88313 SPECIAL STAINS GROUP 2: CPT | Mod: 26,,, | Performed by: PATHOLOGY

## 2018-01-23 PROCEDURE — 90935 HEMODIALYSIS ONE EVALUATION: CPT

## 2018-01-23 PROCEDURE — 25000003 PHARM REV CODE 250: Performed by: PHYSICIAN ASSISTANT

## 2018-01-23 PROCEDURE — 85027 COMPLETE CBC AUTOMATED: CPT

## 2018-01-23 PROCEDURE — 88341 IMHCHEM/IMCYTCHM EA ADD ANTB: CPT | Mod: 26,,, | Performed by: PATHOLOGY

## 2018-01-23 PROCEDURE — 85007 BL SMEAR W/DIFF WBC COUNT: CPT

## 2018-01-23 PROCEDURE — 63600175 PHARM REV CODE 636 W HCPCS: Performed by: PHYSICIAN ASSISTANT

## 2018-01-23 PROCEDURE — 80053 COMPREHEN METABOLIC PANEL: CPT

## 2018-01-23 PROCEDURE — 88342 IMHCHEM/IMCYTCHM 1ST ANTB: CPT | Performed by: PATHOLOGY

## 2018-01-23 PROCEDURE — B4185 PARENTERAL SOL 10 GM LIPIDS: HCPCS | Performed by: NURSE PRACTITIONER

## 2018-01-23 PROCEDURE — 88342 IMHCHEM/IMCYTCHM 1ST ANTB: CPT | Mod: 26,,, | Performed by: PATHOLOGY

## 2018-01-23 RX ORDER — MIDAZOLAM HYDROCHLORIDE 1 MG/ML
INJECTION INTRAMUSCULAR; INTRAVENOUS CODE/TRAUMA/SEDATION MEDICATION
Status: COMPLETED | OUTPATIENT
Start: 2018-01-23 | End: 2018-01-23

## 2018-01-23 RX ORDER — FENTANYL CITRATE 50 UG/ML
INJECTION, SOLUTION INTRAMUSCULAR; INTRAVENOUS CODE/TRAUMA/SEDATION MEDICATION
Status: COMPLETED | OUTPATIENT
Start: 2018-01-23 | End: 2018-01-23

## 2018-01-23 RX ADMIN — GELATIN ABSORBABLE SPONGE 12-7 MM 1 APPLICATOR: 12-7 MISC at 03:01

## 2018-01-23 RX ADMIN — SOYBEAN OIL 250 ML: 20 INJECTION, SOLUTION INTRAVENOUS at 09:01

## 2018-01-23 RX ADMIN — OXYCODONE HYDROCHLORIDE 10 MG: 5 TABLET ORAL at 10:01

## 2018-01-23 RX ADMIN — FENTANYL CITRATE 50 MCG: 50 INJECTION, SOLUTION INTRAMUSCULAR; INTRAVENOUS at 03:01

## 2018-01-23 RX ADMIN — OXYCODONE HYDROCHLORIDE 10 MG: 5 TABLET ORAL at 05:01

## 2018-01-23 RX ADMIN — LEVETIRACETAM 500 MG: 500 TABLET ORAL at 09:01

## 2018-01-23 RX ADMIN — CEFEPIME HYDROCHLORIDE 1 G: 1 INJECTION, SOLUTION INTRAVENOUS at 12:01

## 2018-01-23 RX ADMIN — CYCLOSPORINE 150 MG: 100 CAPSULE, LIQUID FILLED ORAL at 06:01

## 2018-01-23 RX ADMIN — LEVOTHYROXINE SODIUM 75 MCG: 75 TABLET ORAL at 06:01

## 2018-01-23 RX ADMIN — MAGNESIUM SULFATE HEPTAHYDRATE: 500 INJECTION, SOLUTION INTRAMUSCULAR; INTRAVENOUS at 09:01

## 2018-01-23 RX ADMIN — URSODIOL 300 MG: 300 CAPSULE ORAL at 06:01

## 2018-01-23 RX ADMIN — OXYCODONE HYDROCHLORIDE 10 MG: 5 TABLET ORAL at 12:01

## 2018-01-23 RX ADMIN — FENTANYL CITRATE 50 MCG: 50 INJECTION, SOLUTION INTRAMUSCULAR; INTRAVENOUS at 02:01

## 2018-01-23 RX ADMIN — SODIUM CHLORIDE 350 ML: 0.9 INJECTION, SOLUTION INTRAVENOUS at 09:01

## 2018-01-23 RX ADMIN — CYCLOSPORINE 150 MG: 100 CAPSULE, LIQUID FILLED ORAL at 05:01

## 2018-01-23 RX ADMIN — OXYCODONE HYDROCHLORIDE 10 MG: 5 TABLET ORAL at 03:01

## 2018-01-23 RX ADMIN — HEPARIN SODIUM 5000 UNITS: 5000 INJECTION, SOLUTION INTRAVENOUS; SUBCUTANEOUS at 09:01

## 2018-01-23 RX ADMIN — MIDAZOLAM HYDROCHLORIDE 1 MG: 1 INJECTION, SOLUTION INTRAMUSCULAR; INTRAVENOUS at 03:01

## 2018-01-23 RX ADMIN — OXYCODONE HYDROCHLORIDE 10 MG: 5 TABLET ORAL at 08:01

## 2018-01-23 RX ADMIN — HEPARIN SODIUM 1000 UNITS: 1000 INJECTION, SOLUTION INTRAVENOUS; SUBCUTANEOUS at 11:01

## 2018-01-23 RX ADMIN — LEVETIRACETAM 500 MG: 500 TABLET ORAL at 12:01

## 2018-01-23 RX ADMIN — ATOVAQUONE 1500 MG: 750 SUSPENSION ORAL at 06:01

## 2018-01-23 RX ADMIN — URSODIOL 300 MG: 300 CAPSULE ORAL at 09:01

## 2018-01-23 NOTE — ASSESSMENT & PLAN NOTE
- unclear source of fever, previously thought to be related to peritonitis  - blood cultures prelim NGTD. No urine cx obtained as pt anuric.   - Pt underwent thoracentesis, paracentesis, and drain placement into fluid collection 1/11/18.   - All fluids reviewed and negative for infection. Cultures NGTD.  - ID was following have since signed off. Cefepime d/c 1/15.   - Remains febrile since 1/17. Repeat blood cx NGTD. Resumed Vanc/Cefepime given fever 1/17.  - ID reconsulted --> fungal markers pending (crypto Ag, Aspergillus Ag, Fungitell Assay, Histo Ag)  - CT C/A/P with large R pleural effusion and ascites --> both drained 1/19 and negative for infection  - Plan to bx and para, send for cx/CMV staining

## 2018-01-23 NOTE — SUBJECTIVE & OBJECTIVE
Scheduled Meds:   atovaquone  1,500 mg Per NG tube Daily    ceFEPime (MAXIPIME) IVPB  1 g Intravenous Q12H    cycloSPORINE modified  150 mg Oral BID    docusate sodium  100 mg Oral TID PC    [START ON 1/25/2018] epoetin maurisio (PROCRIT) injection  10,000 Units Intravenous Every Tues, Thurs, Sat    fat emulsion 20%  250 mL Intravenous Daily    heparin (porcine)  5,000 Units Subcutaneous Q12H    levETIRAcetam  500 mg Oral BID    levothyroxine  75 mcg Oral Before breakfast    omeprazole  40 mg Oral Before breakfast    polyethylene glycol  17 g Oral Daily    ursodiol  300 mg Oral BID     Continuous Infusions:   TPN ADULT CENTRAL LINE CUSTOM 40 mL/hr at 01/22/18 2158    TPN ADULT CENTRAL LINE CUSTOM       PRN Meds:sodium chloride, sodium chloride 0.9%, sodium chloride 0.9%, sodium chloride 0.9%, sodium chloride 0.9%, acetaminophen, heparin (porcine), omnipaque, ondansetron, ondansetron, oxyCODONE, oxyCODONE, prochlorperazine, simethicone, sodium chloride 0.9%    Review of Systems   Constitutional: Positive for activity change, appetite change and fatigue. Negative for chills and fever.   HENT: Negative.    Respiratory: Negative for cough, shortness of breath and wheezing.    Cardiovascular: Positive for leg swelling. Negative for chest pain.   Gastrointestinal: Positive for abdominal distention, abdominal pain and constipation. Negative for nausea and vomiting.   Genitourinary: Negative for difficulty urinating.        Anuric   Musculoskeletal: Negative for arthralgias, back pain and myalgias.   Skin: Positive for wound. Negative for color change and rash.   Allergic/Immunologic: Positive for immunocompromised state.   Neurological: Positive for weakness. Negative for dizziness, seizures and headaches.   Psychiatric/Behavioral: Positive for decreased concentration and dysphoric mood. Negative for confusion. The patient is not nervous/anxious.    All other systems reviewed and are negative.    Objective:      Vital Signs (Most Recent):  Temp: 98.1 °F (36.7 °C) (01/23/18 1214)  Pulse: 89 (01/23/18 1214)  Resp: 20 (01/23/18 1214)  BP: 113/66 (01/23/18 1214)  SpO2: 98 % (01/23/18 1214) Vital Signs (24h Range):  Temp:  [98.1 °F (36.7 °C)-99.5 °F (37.5 °C)] 98.1 °F (36.7 °C)  Pulse:  [74-89] 89  Resp:  [16-20] 20  SpO2:  [95 %-99 %] 98 %  BP: (113-131)/(64-82) 113/66     Weight: 70.1 kg (154 lb 8.7 oz)  Body mass index is 24.94 kg/m².    Intake/Output - Last 3 Shifts       01/21 0700 - 01/22 0659 01/22 0700 - 01/23 0659 01/23 0700 - 01/24 0659    P.O. 100 820     I.V. (mL/kg)       Other       IV Piggyback 700 600     TPN  702.6     Total Intake(mL/kg) 800 (11.4) 2122.6 (30.3)     Urine (mL/kg/hr) 0 (0) 0 (0)     Emesis/NG output 0 (0) 0 (0)     Other 0 (0) 0 (0)     Stool 0 (0) 0 (0)     Blood 0 (0) 0 (0)     Total Output 0 0      Net +800 +2122.6             Urine Occurrence 0 x 1 x     Stool Occurrence 1 x 0 x     Emesis Occurrence 0 x 0 x           Physical Exam   Constitutional: He is oriented to person, place, and time. He appears well-developed. No distress.   Temporal and distal extremity muscle wasting   HENT:   Head: Normocephalic and atraumatic.   Mouth/Throat: No oropharyngeal exudate.   Eyes: EOM are normal. Pupils are equal, round, and reactive to light. Scleral icterus is present.   Neck: Normal range of motion. Neck supple. No JVD present. No thyromegaly present.   Cardiovascular: Normal rate, regular rhythm, normal heart sounds and intact distal pulses.    No murmur heard.  Pulmonary/Chest: Effort normal. No respiratory distress. He has decreased breath sounds in the right middle field, the right lower field and the left lower field. He has no wheezes. He exhibits no tenderness.   Diminished to RLL   Abdominal: Soft. Bowel sounds are normal. He exhibits distension and ascites. There is tenderness (mild). There is no rebound and no guarding.   Dressing to chevron.  Wd vac removed 1/12/18  Dependent edema  present R flank; surrounding erythema   Musculoskeletal: Normal range of motion. He exhibits edema (2+ LE edema). He exhibits no tenderness.   Neurological: He is alert and oriented to person, place, and time. He has normal reflexes.   Skin: Skin is warm and dry. Capillary refill takes 2 to 3 seconds. He is not diaphoretic. No erythema.   jaundice   Psychiatric: He has a normal mood and affect. His behavior is normal. Judgment and thought content normal. His mood appears not anxious.   Depressed mood   Nursing note and vitals reviewed.      Laboratory:  Immunosuppressants         Stop Route Frequency     cycloSPORINE modified capsule 150 mg      -- Oral 2 times daily        CBC:     Recent Labs  Lab 01/23/18  0400   WBC 4.84   RBC 2.51*   HGB 7.9*   HCT 22.7*      MCV 90   MCH 31.5*   MCHC 34.8     CMP:     Recent Labs  Lab 01/23/18  0400   GLU 90   CALCIUM 9.2   ALBUMIN 2.0*   PROT 5.3*   *   K 4.2   CO2 24   CL 96   BUN 33*   CREATININE 4.7*   ALKPHOS 154*   ALT 6*   AST 24   BILITOT 2.8*     Coagulation:   No results for input(s): PT, INR, APTT in the last 168 hours.  Labs within the past 24 hours have been reviewed.    Diagnostic Results:  I have personally reviewed all pertinent imaging studies.

## 2018-01-23 NOTE — PROGRESS NOTES
Liver bx completed, pt tolerated well. No apparent distress noted. Band aid applied CDI. Labs collected and sent to pathology. Report called to primary nurse. Pt to be transferred to [facility], report to be given at bedside.

## 2018-01-23 NOTE — PLAN OF CARE
Problem: Patient Care Overview  Goal: Plan of Care Review  Outcome: Ongoing (interventions implemented as appropriate)  - Dressing CDI, change Thurs  - TPN 40 mL/hr continuous, Lipids nightly  - HD done today with 3 L removed  - Liver biopsy complete, awaiting results  - Walk 4x daily with mother in halls   - Regular diet, encourage nutrition.  Was NPO for bx  - Cefipime & Vanc IV, oral prophylactics  - Bed low & locked, call light in reach, nonslip socks in use, calls for assistance, mother at bedside  - Oxycodone 10 mg for pain with moderate relief

## 2018-01-23 NOTE — PROGRESS NOTES
[facility]  Liver Transplant  Progress Note    Patient Name: Jhonny Diana  MRN: [id number]  Admission Date: 2018  Hospital Length of Stay: 12 days  Code Status: Full Code  Primary Care Provider: Primary Doctor No  Post-Operative Day: 96    ORGAN:   LIVER  Disease Etiology: Acute Alcoholic Hepatitis  Donor Type:    - Brain Death  CDC High Risk:   No  Donor CMV Status:   Donor CMV Status: Positive  Donor HBcAB:   Negative  Donor HCV Status:   Negative  Whole or Partial: Whole Liver  Biliary Anastomosis: End to End  Arterial Anatomy: Standard  Subjective:     History of Present Illness:  Jhonny Diana is a 28 y/o male with past medical history of alcoholic cirrhosis.  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 Found to have peritonitis in November (WBC 5000, 75% PNM) neg for bile leak. He was treated initially with vanc/cefepime. Repeat cell counts  with some improvement (WBC 1400, 45% PNM). He has undergone multiple paracenteses as well as abscess drainage of perihepatic fluid collections and treated with antimicrobial therapy but no positive cultures. Of note, biliary stricture also identified and ERCP performed on 2017 with sphincterotomy and biliary stent placed. Liver tests still have not normalized despite intervention, bilirubin and AP remain elevated. Other pertinent PMH current wound vac in place 2/2 wound infection, malnutrition requiring TPN for short course and ongoing hypoalbuminemia, and seizure activity while on prograf and has since been switched to cyclosporine without reoccurrence.  He presented to the ER for fever, abdominal pain, and N/V. He reports fever (103) for 1  day prior. Overnight, he developed N/V, reports small amount of green emesis with new left sided pain. He also endorses worsening SOB with exertion. He was scheduled as an outpatient for follow up paracentesis and IR drainage of fluid collection. CXR in ER shows large pleural effusion with subsegmental atelectasis. Infectious work up initiated in ER. His ANC is 900. Broad spectrum antibiotics initiated in ED. He denies chest pain, palpitations, diarrhea, constipation, or back pain. Denies any sick contacts.    Hospital Course:  Thoracentesis (1.2L off), Paracentesis (1.6L off), and IR drainage of fluid collection 1/11, all fluids negative for infection. ID consulted. Broad spectrum antibiotics d/c'd 1/15. Chronically malnourished with poor PO intake, prealbumin 7. Alex tube placed 1/15 for tube feedings.     ERCP 1/17 with sludge and a biliary stone which was removed and stent exchanged.  Remained with n/v; therefore, TF remained on hold and TPN continued.   Pt with fever s/p ERCP on 1/17 which continued until 1/19.  Vanc/cefepime restarted.  Blood cx 1/17 and 1/18 NGTD.  ID reconsulted.  Fungal markers sent.  CT C/A/P obtained.  With large R pleural effusion and ascites- both drained 1/19 and negative for infx per cell count. Pt afebrile 1/20.     Interval History: No acute events over night.  Tolerated HD this am. Plan for paracentesis and liver biopsy this afternoon per IR.  Large BM Sat and Sun with enema.  Continue TPN.  Encouraged ambulation at least 4 times today.  Spirits improved today.     Scheduled Meds:   atovaquone  1,500 mg Per NG tube Daily    ceFEPime (MAXIPIME) IVPB  1 g Intravenous Q12H    cycloSPORINE modified  150 mg Oral BID    docusate sodium  100 mg Oral TID PC    [START ON 1/25/2018] epoetin maurisio (PROCRIT) injection  10,000 Units Intravenous Every Tues, Thurs, Sat    fat emulsion 20%  250 mL Intravenous Daily    heparin (porcine)  5,000 Units Subcutaneous Q12H    levETIRAcetam  500 mg  Oral BID    levothyroxine  75 mcg Oral Before breakfast    omeprazole  40 mg Oral Before breakfast    polyethylene glycol  17 g Oral Daily    ursodiol  300 mg Oral BID     Continuous Infusions:   TPN ADULT CENTRAL LINE CUSTOM 40 mL/hr at 01/22/18 2158    TPN ADULT CENTRAL LINE CUSTOM       PRN Meds:sodium chloride, sodium chloride 0.9%, sodium chloride 0.9%, sodium chloride 0.9%, sodium chloride 0.9%, acetaminophen, heparin (porcine), omnipaque, ondansetron, ondansetron, oxyCODONE, oxyCODONE, prochlorperazine, simethicone, sodium chloride 0.9%    Review of Systems   Constitutional: Positive for activity change, appetite change and fatigue. Negative for chills and fever.   HENT: Negative.    Respiratory: Negative for cough, shortness of breath and wheezing.    Cardiovascular: Positive for leg swelling. Negative for chest pain.   Gastrointestinal: Positive for abdominal distention, abdominal pain and constipation. Negative for nausea and vomiting.   Genitourinary: Negative for difficulty urinating.        Anuric   Musculoskeletal: Negative for arthralgias, back pain and myalgias.   Skin: Positive for wound. Negative for color change and rash.   Allergic/Immunologic: Positive for immunocompromised state.   Neurological: Positive for weakness. Negative for dizziness, seizures and headaches.   Psychiatric/Behavioral: Positive for decreased concentration and dysphoric mood. Negative for confusion. The patient is not nervous/anxious.    All other systems reviewed and are negative.    Objective:     Vital Signs (Most Recent):  Temp: 98.1 °F (36.7 °C) (01/23/18 1214)  Pulse: 89 (01/23/18 1214)  Resp: 20 (01/23/18 1214)  BP: 113/66 (01/23/18 1214)  SpO2: 98 % (01/23/18 1214) Vital Signs (24h Range):  Temp:  [98.1 °F (36.7 °C)-99.5 °F (37.5 °C)] 98.1 °F (36.7 °C)  Pulse:  [74-89] 89  Resp:  [16-20] 20  SpO2:  [95 %-99 %] 98 %  BP: (113-131)/(64-82) 113/66     Weight: 70.1 kg (154 lb 8.7 oz)  Body mass index is 24.94  kg/m².    Intake/Output - Last 3 Shifts       01/21 0700 - 01/22 0659 01/22 0700 - 01/23 0659 01/23 0700 - 01/24 0659    P.O. 100 820     I.V. (mL/kg)       Other       IV Piggyback 700 600     TPN  702.6     Total Intake(mL/kg) 800 (11.4) 2122.6 (30.3)     Urine (mL/kg/hr) 0 (0) 0 (0)     Emesis/NG output 0 (0) 0 (0)     Other 0 (0) 0 (0)     Stool 0 (0) 0 (0)     Blood 0 (0) 0 (0)     Total Output 0 0      Net +800 +2122.6             Urine Occurrence 0 x 1 x     Stool Occurrence 1 x 0 x     Emesis Occurrence 0 x 0 x           Physical Exam   Constitutional: He is oriented to person, place, and time. He appears well-developed. No distress.   Temporal and distal extremity muscle wasting   HENT:   Head: Normocephalic and atraumatic.   Mouth/Throat: No oropharyngeal exudate.   Eyes: EOM are normal. Pupils are equal, round, and reactive to light. Scleral icterus is present.   Neck: Normal range of motion. Neck supple. No JVD present. No thyromegaly present.   Cardiovascular: Normal rate, regular rhythm, normal heart sounds and intact distal pulses.    No murmur heard.  Pulmonary/Chest: Effort normal. No respiratory distress. He has decreased breath sounds in the right middle field, the right lower field and the left lower field. He has no wheezes. He exhibits no tenderness.   Diminished to RLL   Abdominal: Soft. Bowel sounds are normal. He exhibits distension and ascites. There is tenderness (mild). There is no rebound and no guarding.   Dressing to chevron.  Wd vac removed 1/12/18  Dependent edema present R flank; surrounding erythema   Musculoskeletal: Normal range of motion. He exhibits edema (2+ LE edema). He exhibits no tenderness.   Neurological: He is alert and oriented to person, place, and time. He has normal reflexes.   Skin: Skin is warm and dry. Capillary refill takes 2 to 3 seconds. He is not diaphoretic. No erythema.   jaundice   Psychiatric: He has a normal mood and affect. His behavior is normal.  Judgment and thought content normal. His mood appears not anxious.   Depressed mood   Nursing note and vitals reviewed.      Laboratory:  Immunosuppressants         Stop Route Frequency     cycloSPORINE modified capsule 150 mg      -- Oral 2 times daily        CBC:     Recent Labs  Lab 01/23/18  0400   WBC 4.84   RBC 2.51*   HGB 7.9*   HCT 22.7*      MCV 90   MCH 31.5*   MCHC 34.8     CMP:     Recent Labs  Lab 01/23/18  0400   GLU 90   CALCIUM 9.2   ALBUMIN 2.0*   PROT 5.3*   *   K 4.2   CO2 24   CL 96   BUN 33*   CREATININE 4.7*   ALKPHOS 154*   ALT 6*   AST 24   BILITOT 2.8*     Coagulation:   No results for input(s): PT, INR, APTT in the last 168 hours.  Labs within the past 24 hours have been reviewed.    Diagnostic Results:  I have personally reviewed all pertinent imaging studies.    Assessment/Plan:     * Fever    - unclear source of fever, previously thought to be related to peritonitis  - blood cultures prelim NGTD. No urine cx obtained as pt anuric.   - Pt underwent thoracentesis, paracentesis, and drain placement into fluid collection 1/11/18.   - All fluids reviewed and negative for infection. Cultures NGTD.  - ID was following have since signed off. Cefepime d/c 1/15.   - Remains febrile since 1/17. Repeat blood cx NGTD. Resumed Vanc/Cefepime given fever 1/17.  - ID reconsulted --> fungal markers pending (crypto Ag, Aspergillus Ag, Fungitell Assay, Histo Ag)  - CT C/A/P with large R pleural effusion and ascites --> both drained 1/19 and negative for infection  - Plan to bx and para, send for cx/CMV staining        Liver transplanted    - Post op course complicated by fevers and hyperbilirubinemia  - ERCP 12/6 with post-anastomosis stricture with stent placement  - AST/ALT normal. Tbili/alkphos remain elevated  - Liver US 1/8 showed 3.9cm complex fluid collection anterior to right lobe and moderate nonspecific complex ascites inferior to transplant.   - IR placed drain 1/11/18, cell count  negative for infection.  - PBS consulted. PBS not comfortable proceeding with EUS with liver biopsy given fever and possible infectious process going on. ERCP 1/17 with stone and sludge.  Pt placed on Actigall.  - Bili remains elevated.  - Transplant bx in AM        Acute renal failure with tubular necrosis    - HD resumed on previous admission. Now anuric and dialyzes M-W-F.  - Nephrology following.    - HD Sat since held Friday for thora/para  - HD transitioned to TTS schedule        Delayed surgical wound healing    - wd vac removed 1/12/18. Wound care following, recommend dc wound vac. Wound healing well. Aquacel AG and Mepilex border dressing applied via wound care recs.           Long-term use of immunosuppressant medication    - Maintenance IS with cyclosporine. MMF on hold for infections and neutropenia. Continue to check cyclosporine level daily. Assess for toxicity and adjust level as needed.        Prophylactic immunotherapy    - See long term use of immunosuppression.         Severe protein-calorie malnutrition    - Poor PO intake since transplant requiring short course of TPN during previous hospital stay.   - albumin remains decreased but appetite slowly improving per pt.   - Dietary consulted. Will need to closely monitor PO intake.   - supplements also ordered.   - SHANE with tube feeding start 1/16. Stopped 1/17/18, was not tolerating. [place] removed 1/20.  - prealbumin 7 on 1/15 --> 11/1/22  - TPN started 1/16/18.   - Pt able to eat minimally 1/21 and 1/22 which is an improvement        Constipation    - Enema ordered 1/20 and 1/21 with BM  - Encourage ambulation          At risk for opportunistic infections    - Hold Valcyte as WBC low.  CMV PCR 1/11 undetected.  - Continue Bactrim for PCP proph  - isavu for fungal prophylaxis 2/2 complicated post-op course        Anemia of chronic disease    - PRBC x 1 1/19  - PRBC x 1 1/21  - Continue daily CBC        Other ascites    - Paracentesis performed 1/11/18  with 1600 ml removed.    - fluid negative for infection.   - Repeat para 1/19 negative for infection per cell count  - Para Wed 1/24 prior to liver bx        Neutropenia    - Infectious work up negative so far, dc antibiotics 1/15.  - CMV PCR from 1/11/18 not detected. Monitor.  - Neupogen given 1/16/18 with improvement in ANC seen.         Nausea and vomiting    - h/o constipation, reports having regular BM's with bowel regimen  - vomiting episode x 1 at home. Anti-emetics ordered PRN.  - KUB repeated 1/14/18 given increased abdominal pain- mild, generalized bowel distention suggesting ileus.  Diet changed to clears as tolerated for bowel rest   - SHANE tube placed 1/16 for tube feeds. Not tolerating tube feeds, stopped 1/17/18.  - D/C shane 1/19  - chronic constipation, Cont bowel regiment.           Pleural effusion, right    - CXR in ER with large right pleural effusion with subjective c/o worsening SOB  - thoracentesis completed- 1200 ml removed.  SOB with significant improvement.   - Cell count reviewed and negative for infection.   - resp even and non labored.  O2 sat 94-98%.  - Repeat thora 1/19 negative for infection per cell count  - Reaccumulating fluid on xray 1/21        Physical deconditioning    - PT ordered.              VTE Risk Mitigation         Ordered     heparin (porcine) injection 1,000 Units  As needed (PRN)     Route:  Intra-Catheter        01/12/18 1017     heparin (porcine) injection 5,000 Units  Every 12 hours     Route:  Subcutaneous        01/12/18 0926     Medium Risk of VTE  Once      01/11/18 0351     Place sequential compression device  Until discontinued      01/11/18 0351          The patients clinical status was discussed at multidisplinary rounds, involving transplant surgery, transplant medicine, pharmacy, nursing, nutrition, and social work    Discharge Planning:  Not candidate at this time  Monitor kidney function --> may need outpt HD  Monitor HH needs vs rehab for  deconditioned status      Seda Camacho NP  Liver Transplant  [facility]

## 2018-01-23 NOTE — PLAN OF CARE
Patient out of the room on rounds. Chart was reviewed, no change in plans for now. Will re-evaluated tomorrow morning.    Rk Che MD  Infectious Disease Fellow, PGY-5  Spectra: [phone]  Pager: [phone]  [facility]

## 2018-01-23 NOTE — PROGRESS NOTES
Pt arrived to IR room 189 for para/liver biopsy, no acute distress noted. Orders and labs reviewed on chart. Awaiting consent.

## 2018-01-23 NOTE — PROGRESS NOTES
Dialysis completed. Right IJ tunneled catheter flushed with normal saline, heparinized, capped and taped. Patient dialyzed for 3.5 hours with fluid removal of 3 liters. Tolerated well with stable vital signs.

## 2018-01-23 NOTE — PROGRESS NOTES
[facility] NOTE     Patient currently on hemodialysis for removal of uremic toxins .     Patient seen and evaluated on hemodialysis, tolerating treatment, see HD flowsheet for vitals and assessments.      No Hypotension, chest pain, shortness of breath, cramping, nausea or vomiting.     Tolerating 3 L net UF. Right TDC no issues.     Kay Guillen NP  Nephrology

## 2018-01-23 NOTE — PROGRESS NOTES
Patient received from floor with report from Rizwana Slaughter. Maintenance dialysis began per orders via right IJ tunneled catheter.

## 2018-01-23 NOTE — PLAN OF CARE
-Pt AAOx4, VSS, SpO- 97% on RA, t max overnight- 99.5, IV cefepime given as scheduled  - TPN @ 40 ml/hr + lipids running at 20.8 ml/hr, tolerating well.   - Chevron dressing remains CDI, changed yesterday per WC   - Abdominal pain controlled with oxycodone 10 mg q4h, received x3 overnight.   - Pt walking around with mother in evening, gas overnight- no BM, nausea intermitted, received PRN zofran and compazine   - Bed in lowest position, non skid socks worn, mother to remain at bedside.  - plan for HD in AM and liver biopsy + paracentesis, patient has been NPO since MN.

## 2018-01-23 NOTE — H&P
Inpatient Radiology Pre-procedure Note    History of Present Illness:  Jhonny Diana is a 27 y.o. male with liver transplant on 10/19/17 and recurrent fevers who presents for paracentesis and liver biopsy.  Admission H&P reviewed.  Past Medical History:   Diagnosis Date    Alcoholic hepatitis with ascites     Alcoholic hepatitis with ascites     History of hematemesis 9/28/2017    Hypertension     Renal disorder      Past Surgical History:   Procedure Laterality Date    APPENDECTOMY      ERCP      ESOPHAGOGASTRODUODENOSCOPY      LIVER TRANSPLANT         Review of Systems:   As documented in primary team H&P    Home Meds:   Prior to Admission medications    Medication Sig Start Date End Date Taking? Authorizing Provider   aspirin (ECOTRIN) 81 MG EC tablet Take 1 tablet (81 mg total) by mouth once daily. 11/8/17   Nathanael Medina MD   atovaquone (MEPRON) 750 mg/5 mL Susp Take 10 mLs (1,500 mg total) by mouth once daily. Stop on 4/17/18 10/19/17 4/17/18  Nathanael Medina MD   cycloSPORINE modified, NEORAL, 25 MG capsule Take 3 capsules (75 mg total) by mouth 2 (two) times daily. 1/9/18   Quiana Carter MD   docusate sodium (COLACE) 100 MG capsule Take 100 mg by mouth 3 (three) times daily as needed for Constipation.    Historical Provider, MD   ergocalciferol (ERGOCALCIFEROL) 50,000 unit Cap Take 1 capsule (50,000 Units total) by mouth every 7 days. 11/7/17   Nathanael Medina MD   levETIRAcetam (KEPPRA) 500 MG Tab Take 1 tablet (500 mg total) by mouth 2 (two) times daily. 11/7/17 11/7/18  Nathanael Medina MD   levothyroxine (SYNTHROID) 75 MCG tablet Take 1 tablet (75 mcg total) by mouth before breakfast. 12/21/17   Nathanael Medina MD   mirtazapine (REMERON) 7.5 MG Tab Take 1 tablet (7.5 mg total) by mouth every evening. 12/20/17   Nathanael Medina MD   multivitamin (THERAGRAN) tablet Take 1 tablet by mouth once daily. 11/7/17   Nathanael Medina MD   ondansetron (ZOFRAN-ODT) 8 MG TbDL Take 1 tablet (8 mg total) by mouth every 8  (eight) hours as needed (nausea). 1/10/18   Quiana Carter MD   oxyCODONE (ROXICODONE) 10 mg Tab immediate release tablet Take 0.5-1 tablets (5-10 mg total) by mouth every 6 (six) hours as needed for Pain. 1/10/18   Quiana Carter MD   pantoprazole (PROTONIX) 40 MG tablet Take 1 tablet (40 mg total) by mouth once daily. 11/8/17   Nathanael Medina MD   sodium bicarbonate 650 MG tablet Take 2 tablets (1,300 mg total) by mouth 2 (two) times daily. 11/30/17   Geronimo Vera PA-C   thiamine 100 MG tablet Take 1 tablet (100 mg total) by mouth once daily. 11/7/17   Nathanael Medina MD   ursodiol (ACTIGALL) 300 mg capsule Take 1 capsule (300 mg total) by mouth 2 (two) times daily. 12/20/17   Nathanael Medina MD     Scheduled Meds:    atovaquone  1,500 mg Per NG tube Daily    ceFEPime (MAXIPIME) IVPB  1 g Intravenous Q12H    cycloSPORINE modified  150 mg Oral BID    docusate sodium  100 mg Oral TID PC    [START ON 1/25/2018] epoetin maurisio (PROCRIT) injection  10,000 Units Intravenous Every Tues, Thurs, Sat    fat emulsion 20%  250 mL Intravenous Daily    heparin (porcine)  5,000 Units Subcutaneous Q12H    levETIRAcetam  500 mg Oral BID    levothyroxine  75 mcg Oral Before breakfast    omeprazole  40 mg Oral Before breakfast    polyethylene glycol  17 g Oral Daily    ursodiol  300 mg Oral BID     Continuous Infusions:    TPN ADULT CENTRAL LINE CUSTOM 40 mL/hr at 01/22/18 2158    TPN ADULT CENTRAL LINE CUSTOM       PRN Meds:sodium chloride, sodium chloride 0.9%, sodium chloride 0.9%, sodium chloride 0.9%, sodium chloride 0.9%, acetaminophen, heparin (porcine), omnipaque, ondansetron, ondansetron, oxyCODONE, oxyCODONE, prochlorperazine, simethicone, sodium chloride 0.9%  Anticoagulants/Antiplatelets: Heparin- last dose 1/22/18 2200    Allergies:   Review of patient's allergies indicates:   Allergen Reactions    Bactrim [sulfamethoxazole-trimethoprim] Other (See Comments)     Mookie Trell Syndrome     Sedation Hx:  have not been any systemic reactions    Labs:  No results for input(s): INR in the last 168 hours.    Invalid input(s):  PT,  PTT    Recent Labs  Lab 01/23/18  0400   WBC 4.84   HGB 7.9*   HCT 22.7*   MCV 90         Recent Labs  Lab 01/23/18  0400   GLU 90   *   K 4.2   CL 96   CO2 24   BUN 33*   CREATININE 4.7*   CALCIUM 9.2   MG 1.8   ALT 6*   AST 24   ALBUMIN 2.0*   BILITOT 2.8*         Vitals:  Temp: 98.1 °F (36.7 °C) (01/23/18 1214)  Pulse: 89 (01/23/18 1214)  Resp: 20 (01/23/18 1214)  BP: 113/66 (01/23/18 1214)  SpO2: 98 % (01/23/18 1214)     Physical Exam:  ASA: 2  Mallampati: 2    General: no acute distress  Mental Status: alert and oriented to person, place and time  HEENT: normocephalic, atraumatic  Chest: unlabored breathing  Heart: regular heart rate  Abdomen: distended  Extremity: moves all extremities    Plan: paracentesis and liver biopsy  Sedation Plan: moderate    Brandon Long MD  Radiology PGY-2  [phone]

## 2018-01-23 NOTE — PT/OT/SLP PROGRESS
Physical Therapy      Patient Name:  Jhonny Diana   MRN:  [id number]    Pt evaluated and discharged from acute PT 1/16/2018 2nd to independence with functional mobility.  Geronimo Vera PA-C re-consulted therapy services on 1/22/2018 to give pt an exercise program to prevent deconditioning. Upon PT arrival to room, pt out of room for procedure. Mother present in room and given a handout with 4 exercises (pictures, descriptions, and frequency included) and a reminder to walk in the jenkins 3x per day. All exercises explained and demonstrated. PT stressed that pt should only do exercises when someone else is present for safety. Mother verbalized agreement. Mother re-iterated that she did not think pt needed acute PT services but an exercise program.     Exercises included:    Once per day  Heel raises 3x10 reps  Standing marching 3x10 reps  Standing hip abduction 3x10 reps  Sit to stands 3x10 reps    Ambulate 3x per day in hallway    Aminata Hunter, PT, DPT  1/23/2018  [phone]

## 2018-01-23 NOTE — PROGRESS NOTES
Pt to [facility]. Report received from SISSY Stark. No complaints or signs of discomfort at this time. Will continue to monitor.

## 2018-01-23 NOTE — PROGRESS NOTES
SW attempt to meet with the patient but the patient is out of the room at HD.  SW remains available.

## 2018-01-24 PROBLEM — R79.89 ELEVATED LIVER FUNCTION TESTS: Status: RESOLVED | Noted: 2018-01-17 | Resolved: 2018-01-24

## 2018-01-24 LAB
ABO + RH BLD: NORMAL
ALBUMIN SERPL BCP-MCNC: 2 G/DL
ALP SERPL-CCNC: 175 U/L
ALT SERPL W/O P-5'-P-CCNC: 7 U/L
ANION GAP SERPL CALC-SCNC: 10 MMOL/L
ANISOCYTOSIS BLD QL SMEAR: SLIGHT
AST SERPL-CCNC: 27 U/L
BASOPHILS # BLD AUTO: ABNORMAL K/UL
BASOPHILS NFR BLD: 0 %
BILIRUB SERPL-MCNC: 2.9 MG/DL
BLD GP AB SCN CELLS X3 SERPL QL: NORMAL
BUN SERPL-MCNC: 18 MG/DL
CALCIUM SERPL-MCNC: 8.8 MG/DL
CHLORIDE SERPL-SCNC: 100 MMOL/L
CO2 SERPL-SCNC: 25 MMOL/L
CREAT SERPL-MCNC: 3.1 MG/DL
CYCLOSPORINE BLD LC/MS/MS-MCNC: 166 NG/ML
DIFFERENTIAL METHOD: ABNORMAL
EOSINOPHIL # BLD AUTO: ABNORMAL K/UL
EOSINOPHIL NFR BLD: 3 %
ERYTHROCYTE [DISTWIDTH] IN BLOOD BY AUTOMATED COUNT: 16.5 %
EST. GFR  (AFRICAN AMERICAN): 30 ML/MIN/1.73 M^2
EST. GFR  (NON AFRICAN AMERICAN): 26 ML/MIN/1.73 M^2
GLUCOSE SERPL-MCNC: 85 MG/DL
HCT VFR BLD AUTO: 22.4 %
HCT VFR BLD AUTO: 22.9 %
HGB BLD-MCNC: 7.5 G/DL
HGB BLD-MCNC: 7.5 G/DL
HISTOPLASMA AG INTERP.: NEGATIVE
HISTOPLASMA RESULT: NORMAL NG/ML
HYPOCHROMIA BLD QL SMEAR: ABNORMAL
IMM GRANULOCYTES # BLD AUTO: ABNORMAL K/UL
IMM GRANULOCYTES NFR BLD AUTO: ABNORMAL %
LYMPHOCYTES # BLD AUTO: ABNORMAL K/UL
LYMPHOCYTES NFR BLD: 16 %
MAGNESIUM SERPL-MCNC: 1.8 MG/DL
MCH RBC QN AUTO: 30.6 PG
MCHC RBC AUTO-ENTMCNC: 33.5 G/DL
MCV RBC AUTO: 91 FL
MONOCYTES # BLD AUTO: ABNORMAL K/UL
MONOCYTES NFR BLD: 10 %
MYELOCYTES NFR BLD MANUAL: 1 %
NEUTROPHILS NFR BLD: 70 %
NRBC BLD-RTO: 0 /100 WBC
OVALOCYTES BLD QL SMEAR: ABNORMAL
PHOSPHATE SERPL-MCNC: 3.9 MG/DL
PLATELET # BLD AUTO: 166 K/UL
PMV BLD AUTO: 10.6 FL
POIKILOCYTOSIS BLD QL SMEAR: SLIGHT
POLYCHROMASIA BLD QL SMEAR: ABNORMAL
POTASSIUM SERPL-SCNC: 4.1 MMOL/L
PROT SERPL-MCNC: 5.4 G/DL
RBC # BLD AUTO: 2.45 M/UL
SODIUM SERPL-SCNC: 135 MMOL/L
VANCOMYCIN SERPL-MCNC: 14.4 UG/ML
WBC # BLD AUTO: 4.11 K/UL

## 2018-01-24 PROCEDURE — 99233 SBSQ HOSP IP/OBS HIGH 50: CPT | Mod: ,,, | Performed by: NURSE PRACTITIONER

## 2018-01-24 PROCEDURE — 20600001 HC STEP DOWN PRIVATE ROOM

## 2018-01-24 PROCEDURE — 85014 HEMATOCRIT: CPT

## 2018-01-24 PROCEDURE — B4185 PARENTERAL SOL 10 GM LIPIDS: HCPCS | Performed by: NURSE PRACTITIONER

## 2018-01-24 PROCEDURE — 99255 IP/OBS CONSLTJ NEW/EST HI 80: CPT | Mod: ,,, | Performed by: INTERNAL MEDICINE

## 2018-01-24 PROCEDURE — 97803 MED NUTRITION INDIV SUBSEQ: CPT | Performed by: DIETITIAN, REGISTERED

## 2018-01-24 PROCEDURE — 85027 COMPLETE CBC AUTOMATED: CPT

## 2018-01-24 PROCEDURE — 63600175 PHARM REV CODE 636 W HCPCS: Performed by: PHYSICIAN ASSISTANT

## 2018-01-24 PROCEDURE — 83735 ASSAY OF MAGNESIUM: CPT

## 2018-01-24 PROCEDURE — 63600175 PHARM REV CODE 636 W HCPCS: Performed by: NURSE PRACTITIONER

## 2018-01-24 PROCEDURE — 25000003 PHARM REV CODE 250: Performed by: NURSE PRACTITIONER

## 2018-01-24 PROCEDURE — 99233 SBSQ HOSP IP/OBS HIGH 50: CPT | Mod: ,,, | Performed by: INTERNAL MEDICINE

## 2018-01-24 PROCEDURE — 80202 ASSAY OF VANCOMYCIN: CPT

## 2018-01-24 PROCEDURE — 80053 COMPREHEN METABOLIC PANEL: CPT

## 2018-01-24 PROCEDURE — 80158 DRUG ASSAY CYCLOSPORINE: CPT

## 2018-01-24 PROCEDURE — A4217 STERILE WATER/SALINE, 500 ML: HCPCS | Performed by: NURSE PRACTITIONER

## 2018-01-24 PROCEDURE — 85018 HEMOGLOBIN: CPT

## 2018-01-24 PROCEDURE — 86901 BLOOD TYPING SEROLOGIC RH(D): CPT

## 2018-01-24 PROCEDURE — 25000003 PHARM REV CODE 250: Performed by: PHYSICIAN ASSISTANT

## 2018-01-24 PROCEDURE — 84100 ASSAY OF PHOSPHORUS: CPT

## 2018-01-24 PROCEDURE — 85007 BL SMEAR W/DIFF WBC COUNT: CPT

## 2018-01-24 RX ORDER — OMEPRAZOLE 40 MG/1
40 CAPSULE, DELAYED RELEASE ORAL
Qty: 30 CAPSULE | Refills: 11 | Status: ON HOLD | OUTPATIENT
Start: 2018-01-25 | End: 2018-04-30 | Stop reason: SDUPTHER

## 2018-01-24 RX ADMIN — MAGNESIUM SULFATE HEPTAHYDRATE: 500 INJECTION, SOLUTION INTRAMUSCULAR; INTRAVENOUS at 08:01

## 2018-01-24 RX ADMIN — CYCLOSPORINE 150 MG: 100 CAPSULE, LIQUID FILLED ORAL at 08:01

## 2018-01-24 RX ADMIN — URSODIOL 300 MG: 300 CAPSULE ORAL at 08:01

## 2018-01-24 RX ADMIN — ATOVAQUONE 1500 MG: 750 SUSPENSION ORAL at 08:01

## 2018-01-24 RX ADMIN — HEPARIN SODIUM 5000 UNITS: 5000 INJECTION, SOLUTION INTRAVENOUS; SUBCUTANEOUS at 08:01

## 2018-01-24 RX ADMIN — LEVETIRACETAM 500 MG: 500 TABLET ORAL at 08:01

## 2018-01-24 RX ADMIN — OXYCODONE HYDROCHLORIDE 10 MG: 5 TABLET ORAL at 08:01

## 2018-01-24 RX ADMIN — CEFEPIME HYDROCHLORIDE 1 G: 1 INJECTION, SOLUTION INTRAVENOUS at 11:01

## 2018-01-24 RX ADMIN — SOYBEAN OIL 250 ML: 20 INJECTION, SOLUTION INTRAVENOUS at 08:01

## 2018-01-24 RX ADMIN — PROCHLORPERAZINE EDISYLATE 10 MG: 5 INJECTION INTRAMUSCULAR; INTRAVENOUS at 03:01

## 2018-01-24 RX ADMIN — LEVOTHYROXINE SODIUM 75 MCG: 75 TABLET ORAL at 05:01

## 2018-01-24 RX ADMIN — OXYCODONE HYDROCHLORIDE 10 MG: 5 TABLET ORAL at 11:01

## 2018-01-24 RX ADMIN — OXYCODONE HYDROCHLORIDE 10 MG: 5 TABLET ORAL at 02:01

## 2018-01-24 RX ADMIN — ONDANSETRON HYDROCHLORIDE 4 MG: 2 INJECTION, SOLUTION INTRAMUSCULAR; INTRAVENOUS at 11:01

## 2018-01-24 RX ADMIN — CYCLOSPORINE 150 MG: 100 CAPSULE, LIQUID FILLED ORAL at 05:01

## 2018-01-24 RX ADMIN — OXYCODONE HYDROCHLORIDE 10 MG: 5 TABLET ORAL at 05:01

## 2018-01-24 RX ADMIN — OXYCODONE HYDROCHLORIDE 10 MG: 5 TABLET ORAL at 12:01

## 2018-01-24 RX ADMIN — CEFEPIME HYDROCHLORIDE 1 G: 1 INJECTION, SOLUTION INTRAVENOUS at 01:01

## 2018-01-24 RX ADMIN — DOCUSATE SODIUM 100 MG: 100 CAPSULE, LIQUID FILLED ORAL at 08:01

## 2018-01-24 NOTE — SUBJECTIVE & OBJECTIVE
Scheduled Meds:   atovaquone  1,500 mg Per NG tube Daily    ceFEPime (MAXIPIME) IVPB  1 g Intravenous Q12H    cycloSPORINE modified  150 mg Oral BID    docusate sodium  100 mg Oral TID PC    [START ON 1/25/2018] epoetin maurisio (PROCRIT) injection  10,000 Units Intravenous Every Tues, Thurs, Sat    fat emulsion 20%  250 mL Intravenous Daily    heparin (porcine)  5,000 Units Subcutaneous Q12H    levETIRAcetam  500 mg Oral BID    levothyroxine  75 mcg Oral Before breakfast    omeprazole  40 mg Oral Before breakfast    polyethylene glycol  17 g Oral Daily    ursodiol  300 mg Oral BID     Continuous Infusions:   TPN ADULT CENTRAL LINE CUSTOM 40 mL/hr at 01/23/18 2124    TPN ADULT CENTRAL LINE CUSTOM       PRN Meds:sodium chloride, sodium chloride 0.9%, sodium chloride 0.9%, sodium chloride 0.9%, sodium chloride 0.9%, acetaminophen, heparin (porcine), omnipaque, ondansetron, ondansetron, oxyCODONE, oxyCODONE, prochlorperazine, simethicone, sodium chloride 0.9%    Review of Systems   Constitutional: Positive for activity change, appetite change and fatigue. Negative for chills and fever.   HENT: Negative.  Negative for congestion and facial swelling.    Eyes: Negative for pain, discharge and visual disturbance.   Respiratory: Negative for cough, shortness of breath and wheezing.    Cardiovascular: Positive for leg swelling. Negative for chest pain.   Gastrointestinal: Positive for abdominal distention, abdominal pain and constipation. Negative for nausea and vomiting.   Genitourinary: Positive for decreased urine volume. Negative for difficulty urinating.        Anuric   Musculoskeletal: Negative for arthralgias, back pain and myalgias.   Skin: Positive for wound. Negative for color change and rash.   Allergic/Immunologic: Positive for immunocompromised state.   Neurological: Positive for weakness. Negative for dizziness, seizures and headaches.   Psychiatric/Behavioral: Positive for decreased concentration and  dysphoric mood. Negative for confusion. The patient is not nervous/anxious.    All other systems reviewed and are negative.    Objective:     Vital Signs (Most Recent):  Temp: 98.9 °F (37.2 °C) (01/24/18 1542)  Pulse: 80 (01/24/18 1542)  Resp: 17 (01/24/18 1542)  BP: 114/74 (01/24/18 1542)  SpO2: 98 % (01/24/18 1542) Vital Signs (24h Range):  Temp:  [98.6 °F (37 °C)-100.3 °F (37.9 °C)] 98.9 °F (37.2 °C)  Pulse:  [75-89] 80  Resp:  [16-20] 17  SpO2:  [95 %-98 %] 98 %  BP: (109-130)/(64-77) 114/74     Weight: 70.1 kg (154 lb 8.7 oz)  Body mass index is 24.94 kg/m².    Intake/Output - Last 3 Shifts       01/22 0700 - 01/23 0659 01/23 0700 - 01/24 0659 01/24 0700 - 01/25 0659    P.O. 820 360 120    Other  700     IV Piggyback 600  50    .6      Total Intake(mL/kg) 2122.6 (30.3) 1060 (15.1) 170 (2.4)    Urine (mL/kg/hr) 0 (0) 0 (0)     Emesis/NG output 0 (0) 0 (0)     Other 0 (0) 3700 (2.2)     Stool 0 (0)      Blood 0 (0)      Total Output 0 3700      Net +2122.6 -2640 +170           Urine Occurrence 1 x      Stool Occurrence 0 x      Emesis Occurrence 0 x            Physical Exam   Constitutional: He is oriented to person, place, and time. He appears well-developed. No distress.   Temporal and distal extremity muscle wasting   HENT:   Head: Normocephalic and atraumatic.   Mouth/Throat: No oropharyngeal exudate.   Eyes: EOM are normal. Pupils are equal, round, and reactive to light. Scleral icterus is present.   Neck: Normal range of motion. Neck supple. No JVD present. No thyromegaly present.   Cardiovascular: Normal rate, regular rhythm, normal heart sounds and intact distal pulses.    No murmur heard.  Pulmonary/Chest: Effort normal. No respiratory distress. He has decreased breath sounds in the right middle field, the right lower field and the left lower field. He has no wheezes. He exhibits no tenderness.   Diminished to RLL   Abdominal: Soft. Bowel sounds are normal. He exhibits distension and ascites.  There is tenderness (mild). There is no rebound and no guarding.   Dressing to chevron.  Wd vac removed 1/12/18  Dependent edema present R flank; surrounding erythema   Musculoskeletal: Normal range of motion. He exhibits edema (2+ LE edema). He exhibits no tenderness.   Neurological: He is alert and oriented to person, place, and time. He has normal reflexes.   Skin: Skin is warm and dry. Capillary refill takes 2 to 3 seconds. He is not diaphoretic. No erythema.   jaundice   Psychiatric: He has a normal mood and affect. His behavior is normal. Judgment and thought content normal. His mood appears not anxious.   Depressed mood   Nursing note and vitals reviewed.      Laboratory:  Immunosuppressants         Stop Route Frequency     cycloSPORINE modified capsule 150 mg      -- Oral 2 times daily        CBC:   Recent Labs  Lab 01/24/18  0500 01/24/18  0742   WBC 4.11  --    RBC 2.45*  --    HGB 7.5* 7.5*   HCT 22.4* 22.9*     --    MCV 91  --    MCH 30.6  --    MCHC 33.5  --      CMP:   Recent Labs  Lab 01/24/18  0500   GLU 85   CALCIUM 8.8   ALBUMIN 2.0*   PROT 5.4*   *   K 4.1   CO2 25      BUN 18   CREATININE 3.1*   ALKPHOS 175*   ALT 7*   AST 27   BILITOT 2.9*     Labs within the past 24 hours have been reviewed.    Diagnostic Results:  I have personally reviewed all pertinent imaging studies.

## 2018-01-24 NOTE — ASSESSMENT & PLAN NOTE
- PRBC x 1 1/19  - PRBC x 1 1/21  - Continue daily CBC.  - H&H trending down but stable.  - consider blood transfusion with next HD.

## 2018-01-24 NOTE — ASSESSMENT & PLAN NOTE
- Paracentesis performed 1/11/18 with 1600 ml removed.    - fluid negative for infection.   - Repeat para 1/19 negative for infection per cell count  - no fluid seen for paracentesis on 1/23.

## 2018-01-24 NOTE — PROGRESS NOTES
[facility]  Infectious Disease  Progress Note    Patient Name: Jhonny Diana  MRN: [id number]  Admission Date: 1/11/2018  Length of Stay: 13 days  Attending Physician: Nathanael Medina MD  Primary Care Provider: Primary Doctor No    Isolation Status: No active isolations  Assessment/Plan:      * Fever    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+, steroid induction, on maintenance tacro/MMF/pred; c/b seizures, LUARA, superificial wound infection s/p wound vac to [facility] through 1/12, and several recent admissions on 11/24 and 12/3 with culture negative peritonitis with peak WBC ~5k due to suspected indolent biliary leakage s/p sphincterotomy/biliary stent placement over CBD stricture on 12/6 ERCP although notably with no overt leak noted during procedure) who was admitted on 1/11/2017 with acute onset fevers and acute on chronic N/V/abdominal pain (RLQ worst) in the setting of persistent noninflammatory ascites of unknown etiology. He underwent drainage of pleural fluid and ascites that did not show evidence of gross infection and his fevers initially abated on empiric cefepime that was stopped due to a lack of bacterial process found during evaluation. His fevers and symptoms have recurred off antibiotics and ERCP failed to reveal significant biliary pathology as the cause of his FUO. He remains tenuous and repeat taps have yet to show an etiology of his fevers/FTT.     - paracentesis not done yesterday, there was no safe fluid collection identified   - follow liver biopsy results  - fungal markers negative   - continue empiric vancomycin/cefepime for now             Anticipated Disposition: pending    Thank you for your consult. I will follow-up with patient. Please contact us if you have any additional questions.    Rk Che MD  Infectious Disease Fellow, PGY-5  Spectra: [phone]  Pager: [phone]  [facility]    Subjective:     Principal Problem:Fever    HPI: No notes on file  Interval History: afebrile, mild soreness over abdomen, otherwise feeling well    Review of Systems   Constitutional: Negative for chills and fever.   HENT: Negative for sore throat.    Respiratory: Negative for cough, chest tightness and shortness of breath.    Cardiovascular: Negative for chest pain, palpitations and leg swelling.   Gastrointestinal: Negative for abdominal distention, abdominal pain, diarrhea, nausea and vomiting.   Genitourinary: Negative for dysuria, flank pain and urgency.   Skin: Negative for rash.   Neurological: Negative for dizziness, light-headedness and numbness.   Psychiatric/Behavioral: Negative for confusion.     Objective:     Vital Signs (Most Recent):  Temp: 98.6 °F (37 °C) (01/24/18 1145)  Pulse: 84 (01/24/18 1145)  Resp: 17 (01/24/18 1145)  BP: 113/67 (01/24/18 1145)  SpO2: 96 % (01/24/18 1145) Vital Signs (24h Range):  Temp:  [98.6 °F (37 °C)-100.3 °F (37.9 °C)] 98.6 °F (37 °C)  Pulse:  [75-89] 84  Resp:  [14-20] 17  SpO2:  [95 %-98 %] 96 %  BP: (103-130)/(64-77) 113/67     Weight: 70.1 kg (154 lb 8.7 oz)  Body mass index is 24.94 kg/m².    Estimated Creatinine Clearance: 32 mL/min (based on SCr of 3.1 mg/dL (H)).    Physical Exam   Constitutional: He is oriented to person, place, and time. No distress.   HENT:   Mouth/Throat: No oropharyngeal exudate.   Eyes: No scleral icterus.   Cardiovascular: Normal rate and regular rhythm.    Pulmonary/Chest: Effort normal and breath sounds normal. No respiratory distress. He has no wheezes. He has no rales.   Abdominal: Soft. There is no tenderness. There is no rebound.   Surgical wound on right side still slighlty open (clean borders, no erythema, no secretions), left side of surgical wound healing appropriately    Musculoskeletal: He exhibits no edema.   Lymphadenopathy:     He has no cervical adenopathy.   Neurological: He is alert and oriented to  person, place, and time. No cranial nerve deficit.   Skin: He is not diaphoretic.       Significant Labs:   Bilirubin:   Recent Labs  Lab 12/27/17  0755 01/02/18  0805 01/05/18  0805  01/12/18  0627  01/20/18  0500 01/21/18  0500 01/22/18  0430 01/23/18  0400 01/24/18  0500   BILIDIR 1.8* 2.3* 2.2*  --  2.0*  --   --   --   --   --   --    BILITOT 2.2* 3.0* 3.2*  < > 2.7*  < > 2.5* 2.6* 2.9* 2.8* 2.9*   < > = values in this interval not displayed.  Blood Culture:   Recent Labs  Lab 01/11/18  0205 01/17/18  1650 01/17/18  1651 01/18/18  1922 01/18/18  1927   LABBLOO No growth after 5 days. No growth after 5 days. No growth after 5 days. No growth after 5 days. No growth after 5 days.     BMP:   Recent Labs  Lab 01/24/18  0500   GLU 85   *   K 4.1      CO2 25   BUN 18   CREATININE 3.1*   CALCIUM 8.8   MG 1.8     CBC:   Recent Labs  Lab 01/23/18  0400 01/24/18  0500 01/24/18  0742   WBC 4.84 4.11  --    HGB 7.9* 7.5* 7.5*   HCT 22.7* 22.4* 22.9*    166  --      CMP:   Recent Labs  Lab 01/23/18  0400 01/24/18  0500   * 135*   K 4.2 4.1   CL 96 100   CO2 24 25   GLU 90 85   BUN 33* 18   CREATININE 4.7* 3.1*   CALCIUM 9.2 8.8   PROT 5.3* 5.4*   ALBUMIN 2.0* 2.0*   BILITOT 2.8* 2.9*   ALKPHOS 154* 175*   AST 24 27   ALT 6* 7*   ANIONGAP 11 10   EGFRNONAA 15.7* 26.0*     Microbiology Results (last 7 days)     Procedure Component Value Units Date/Time    Culture, Anaerobic [[id number]] Collected:  01/19/18 1140    Order Status:  Completed Specimen:  Pleural Fluid from Pleural Fluid Updated:  01/24/18 0909     Anaerobic Culture Culture in progress    Blood culture [[id number]] Collected:  01/18/18 1927    Order Status:  Completed Specimen:  Blood Updated:  01/23/18 2212     Blood Culture, Routine No growth after 5 days.    Blood culture [[id number]] Collected:  01/18/18 1922    Order Status:  Completed Specimen:  Blood Updated:  01/23/18 2212     Blood Culture, Routine No growth after 5 days.    Blood  culture [[id number]] Collected:  01/17/18 1651    Order Status:  Completed Specimen:  Blood Updated:  01/22/18 2012     Blood Culture, Routine No growth after 5 days.    Blood culture [[id number]] Collected:  01/17/18 1650    Order Status:  Completed Specimen:  Blood Updated:  01/22/18 2012     Blood Culture, Routine No growth after 5 days.    Culture, Fluid  (Aerobic) [[id number]]     Order Status:  No result Specimen:  Body Fluid     Gram stain [[id number]]     Order Status:  No result Specimen:  Body Fluid     Aerobic culture [[id number]]     Order Status:  No result Specimen:  Biopsy from Liver     AFB Culture & Smear [[id number]] Collected:  01/19/18 1101    Order Status:  Completed Specimen:  Abdominal from Ascites Updated:  01/22/18 1424     AFB Culture & Smear Culture in progress     AFB CULTURE STAIN No acid fast bacilli seen.    Aerobic culture [[id number]] Collected:  01/19/18 1140    Order Status:  Completed Specimen:  Pleural Fluid from Pleural Fluid Updated:  01/22/18 0949     Aerobic Bacterial Culture No growth    Culture, Fluid  (Aerobic) [[id number]] Collected:  01/19/18 1101    Order Status:  Completed Specimen:  Abdominal from Ascites Updated:  01/22/18 0949     AEROBIC CULTURE - FLUID No growth     Gram Stain Result No WBC's      No organisms seen    Cryptococcal antigen [[id number]] Collected:  01/20/18 0500    Order Status:  Completed Specimen:  Blood from Blood Updated:  01/20/18 1258     Cryptococcal Ag, Blood Negative    Urine culture [[id number]] Collected:  01/18/18 1903    Order Status:  Completed Specimen:  Urine from Urine, Clean Catch Updated:  01/19/18 2335     Urine Culture, Routine No growth    Gram stain [[id number]] Collected:  01/19/18 1140    Order Status:  Completed Specimen:  Pleural Fluid from Pleural Fluid Updated:  01/19/18 1454     Gram Stain Result No WBC's      No organisms seen    Gram stain [[id number]] Collected:  01/19/18 1101    Order Status:  Canceled Specimen:  Abdominal from  Ascites Updated:  01/19/18 1332    Fungus culture [[id number]] Collected:  01/19/18 1101    Order Status:  Sent Specimen:  Abdominal from Ascites Updated:  01/19/18 1331    Fungus culture [[id number]] Collected:  01/19/18 1140    Order Status:  Sent Specimen:  Pleural Fluid from Pleural Fluid Updated:  01/19/18 1329    Culture, Anaerobic [[id number]] Collected:  01/11/18 1307    Order Status:  Completed Specimen:  Pleural Fluid from Lung, Right Updated:  01/19/18 0718     Anaerobic Culture No anaerobes isolated    Culture, Anaerobe [[id number]] Collected:  01/11/18 1448    Order Status:  Completed Specimen:  Pleural Fluid from Lung, Left Updated:  01/19/18 0718     Anaerobic Culture No anaerobes isolated    Blood culture [[id number]]     Order Status:  Canceled Specimen:  Blood     Blood culture [[id number]]     Order Status:  Canceled Specimen:  Blood     Fungus culture [[id number]] Collected:  01/11/18 1441    Order Status:  Completed Specimen:  Ascites from Ascites Updated:  01/18/18 1038     Fungus (Mycology) Culture Culture in progress    (rule out SBP) Culture, Anaerobic [[id number]] Collected:  01/11/18 1308    Order Status:  Completed Specimen:  Ascites from Ascites Updated:  01/18/18 0805     Anaerobic Culture No anaerobes isolated    Narrative:       To rule out SBP order labs: Aerobic culture [[id number]],  Culture, Anaerobic [[id number]], Gram stain [[id number]], Albumin  [[id number]], Protein [[id number]], LDH [JLZ528], WBC \T\ Dff  [[id number]].          Significant Imaging: I have reviewed all pertinent imaging results/findings within the past 24 hours.

## 2018-01-24 NOTE — ASSESSMENT & PLAN NOTE
- Post op course complicated by fevers and hyperbilirubinemia.  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  - AST/ALT normal. Tbili/alkphos remain elevated.  - Liver US 1/8 showed 3.9 cm complex fluid collection anterior to right lobe and moderate nonspecific complex ascites inferior to transplant.   - IR placed drain 1/11/18, cell count negative for infection.  - PBS consulted. PBS not comfortable proceeding with EUS with liver biopsy given fever and possible infectious process going on. ERCP 1/17 with stone and sludge. Pt placed on Actigall.  - Bili remains elevated.  - Liver biopsy 1/23 results pending.

## 2018-01-24 NOTE — ASSESSMENT & PLAN NOTE
Nutrition Diagnosis  Inadequate energy intake    Related to (etiology):   Decreased appetite, abdominal distention    Signs and Symptoms (as evidenced by):   Pt meeting ~25% of needs at most via oral intake, hollowing temples, protruding clavicle, visible scapula. Requiring TPN to meet 75% EEN and EPN    Interventions/Recommendations (treatment strategy):  See recs    Nutrition Diagnosis Status:   Continues

## 2018-01-24 NOTE — ASSESSMENT & PLAN NOTE
- wd vac removed 1/12/18.  Wound healing well. Aquacel AG and Mepilex border dressing applied via wound care recs.

## 2018-01-24 NOTE — ASSESSMENT & PLAN NOTE
- h/o constipation, reports having regular BM's with bowel regimen  - vomiting episode x 1 at home. Anti-emetics ordered PRN.  - KUB repeated 1/14/18 given increased abdominal pain- mild, generalized bowel distention suggesting ileus.  Diet changed to clears as tolerated for bowel rest   - SHANE tube placed 1/16 for tube feeds. Not tolerating tube feeds, stopped 1/17/18.  - D/C shane 1/19  - chronic constipation, Cont bowel regimen.   - GI consulted.

## 2018-01-24 NOTE — PROGRESS NOTES
SW presented to patient for follow up and continuity of care.  The patient was observed to be sitting in the chair bedside reporting that he just finished walking the hallway.  The patient was accompanied by his mom Sanjuanita who reported adequate coping and made appropriate eye contact when communicating with SW. Mother of the patient denied any needs or concerns at this time.  Sanjuanita reported that she is doing well but hopes the patient gets well soon.  Pt still has an NG tube on TPN and receiving nutrition.  Pt reported he is feeling well and following all the providers recommendations.  Pts and mother  denied any increased mental health symptoms.  SW provided emotional support, reflective listening and normalization.  No discharge plans identified at this time. SW remains available

## 2018-01-24 NOTE — ASSESSMENT & PLAN NOTE
- unclear source of fever, previously thought to be related to peritonitis  - blood cultures prelim NGTD. No urine cx obtained as pt anuric.   - Pt underwent thoracentesis, paracentesis, and drain placement into fluid collection 1/11/18.   - All fluids reviewed and negative for infection. Cultures NGTD.  - ID was following have since signed off. Cefepime d/c 1/15.   - Remains febrile since 1/17. Repeat blood cx NGTD. Resumed Vanc/Cefepime given fever 1/17.  - ID reconsulted --> fungal markers pending (crypto Ag, Aspergillus Ag, Fungitell Assay, Histo Ag)  - CT C/A/P with large R pleural effusion and ascites --> both drained 1/19 and negative for infection  - no fluid for para on 1/23.  - liver bx pending 1/23.

## 2018-01-24 NOTE — ASSESSMENT & PLAN NOTE
- Infectious work up negative so far, dc antibiotics 1/15.  - CMV PCR from 1/11, 1/18 not detected. Monitor.  - Neupogen given 1/16/18 with improvement in ANC seen.

## 2018-01-24 NOTE — PROGRESS NOTES
[facility]  Adult Nutrition  Progress Note    SUMMARY     Recommendations    Recommendation/Intervention: Pt eating minimally today. Recommend increasing protein in TPN to better meet EPN. 90g-100g would be a significant improvement, given muscle wasting and continued malnutrition.     If patient is unable to meet 100% EEN and EPN via oral intake, I recommend PEG placement. I discussed PEG placement with patient at length - he became agreeable once informed bolus feedings did not require pt to be attached to pump 24hrs.     Continue regular diet. RD continued to stress importance of consuming 100% of EEN and EPN in order to regain strength and health. Encouraged ambulation for BM. RD following.     Goals: Consume/tolerate > 85% EEN and EPN  Nutrition Goal Status: progressing towards goal  Communication of RD Recs: reviewed with physician    Reason for Assessment    Reason for Assessment: RD follow-up  Diagnosis: transplant/postoperative complications  Relevent Medical History: OLTx 10/19/2017, complicate post-op w/ LAURA ongoing, chronic malnutrition, cirrhosis 2' EtOH   Interdisciplinary Rounds: attended     General Information Comments: Pt remains on TPN running at ordered goal, pt with improved intake in past 48 hours but still poor; pt with cholangitis, possible future OKTx needed but needs to improve nutrition status first, discussed oral intake + PEG tube placement, constipation remains problem, had large BM yesterday with enema, team consulting GI    Nutrition Discharge Planning: Adequate nutrition PO vs EN    Nutrition Prescription Ordered    Current Diet Order: Regular     Current Nutrition Support Formula Ordered: Novasource Renal (custom TPN 70g AA / 225g dex)  Current Nutrition Support Rate Ordered: 40 (ml)  Current Nutrition Support Frequency Ordered: mL/hr        Evaluation of Received Nutrients/Fluid Intake    Enteral Calories (kcal): 0  Enteral Protein (gm): 0  Enteral (Free Water)  "Fluid (mL): 0     Parenteral Calories (kcal): 1045  Parenteral Protein (gm): 70  Parenteral Fluid (mL): 960      Total Calories (kcal): 1545     Energy Calories Required: not meeting needs  % Kcal Needs: 73      Protein Required: not meeting needs  % Protein Needs: 70      GIR (Glucose Infusion Rate) (mg/kg/min): 2.23 mg/kg/min  Lipid Calories (kcals): 500 kcals  I/O: -4.5L since admit, no uop       Fluid Required:  (per MD)  Comments: LBM   Tolerance: tolerating  % Intake of Estimated Energy Needs: 75 - 100 %  % Meal Intake: 10%     Nutrition Risk Screen     Nutrition Risk Screen: large or nonhealing wound, burn or pressure ulcer    Nutrition/Diet History    Patient Reported Diet/Restrictions/Preferences: low salt  Typical Food/Fluid Intake: eats very little at this time  Food Preferences: No cultural or [employer] food preferences noted  Meal/Snack Patterns: pt likes to cook, when feeling well will cook and then only eat bites  Supplemental Drinks or Food Habits:  (protein powders sometimes)  Factors Affecting Nutritional Intake: abdominal distention, altered gastrointestinal function, decreased appetite        Labs/Tests/Procedures/Meds       Pertinent Labs Reviewed: reviewed, pertinent  Pertinent Labs Comments: Na 135, Cr 3.1, GFR 30, Alk Phos 175, PAB 11, ALT 7  Pertinent Medications Reviewed: reviewed, pertinent  Pertinent Medications Comments: docusate, heparin, omeprezole    Physical Findings    Overall Physical Appearance: generalized wasting, loss of muscle mass, loss of subcutaneous fat, weak  Tubes:  (-)  Oral/Mouth Cavity: WDL  Skin: intact    Anthropometrics    Temp: 98.6 °F (37 °C)     Height: 5' 6" (167.6 cm)  Weight Method: Bed Scale  Weight: 70.1 kg (154 lb 8.7 oz)  Ideal Body Weight (IBW), Male: 142 lb     % Ideal Body Weight, Male (lb): 112.68 lb     BMI (Calculated): 25.9  BMI Grade: 25 - 29.9 - overweight  Weight Loss: unintentional  Usual Body Weight (UBW), k kg (2017 )     % " Usual Body Weight: 86.71  % Weight Change From Usual Weight: -13.47 %             Estimated/Assessed Needs    Weight Used For Calorie Calculations: 62.3 kg (137 lb 5.6 oz)   Height (cm): 167.6 cm  Energy Calorie Requirements (kcal): 3806-6547 (30-35 kcal/kg)  Energy Need Method: Kcal/kg        RMR ([place]-St. Jeor Equation): 1535.75        Weight Used For Protein Calculations: 62.3 kg (137 lb 5.6 oz)  Protein Requirements: 81-94g (1.3-1.5 g/kg)    Fluid Requirements (mL): 1mL/kcal  Fluid Need Method: RDA Method        RDA Method (mL): 1869               Assessment and Plan    Severe protein-calorie malnutrition    Nutrition Diagnosis  Inadequate energy intake    Related to (etiology):   Decreased appetite, abdominal distention    Signs and Symptoms (as evidenced by):   Pt meeting ~25% of needs at most via oral intake, hollowing temples, protruding clavicle, visible scapula. Requiring TPN to meet 75% EEN and EPN    Interventions/Recommendations (treatment strategy):  See recs    Nutrition Diagnosis Status:   Continues              Monitor and Evaluation    Food and Nutrient Intake: energy intake, food and beverage intake  Food and Nutrient Adminstration: diet order  Knowledge/Beliefs/Attitudes: food and nutrition knowledge/skill  Physical Activity and Function: nutrition-related ADLs and IADLs  Anthropometric Measurements: weight, weight change, body mass index  Biochemical Data, Medical Tests and Procedures: electrolyte and renal panel, lipid profile, gastrointestinal profile, glucose/endocrine profile, inflammatory profile  Nutrition-Focused Physical Findings: overall appearance    Nutrition Risk    Level of Risk:  (2x/week)    Nutrition Follow-Up    RD Follow-up?: Yes

## 2018-01-24 NOTE — PLAN OF CARE
Problem: Patient Care Overview  Goal: Plan of Care Review  Outcome: Ongoing (interventions implemented as appropriate)  Pt aao x 4. Pt currently in bedside chair with call light in reach. Free from falls/injury this shift and wearing non-skid footwear when ambulating. VSS. Pt receiving PO oxy 10 mg q 4 hrs for c/o pain. Pt has c/o nausea, IV zofran given and IV compazine given. Pt eating about 25% of meals. TPN and Lipids scheduled again for tonight. Inpt consult to gastro. Foam dressing to chevron incision CDI. Pt has received IV cefepime. Pt ambulatory in hallways several times today with mother. Pt refusing laxative/stool softeners.     Will continue to monitor, assess, and adjust care as needed.

## 2018-01-24 NOTE — ASSESSMENT & PLAN NOTE
- Poor PO intake since transplant requiring short course of TPN during previous hospital stay.   - albumin remains decreased but appetite slowly improving per pt.   - Dietary consulted. Will need to closely monitor PO intake.   - supplements also ordered.   - SHANE with tube feeding start 1/16. Stopped 1/17/18, was not tolerating. Shane removed 1/20.  - prealbumin 7 on 1/15.  - TPN started 1/16/18.   - Pt able to eat minimally 1/21 and 1/22 which is an improvement.

## 2018-01-24 NOTE — ASSESSMENT & PLAN NOTE
- CXR in ER with large right pleural effusion with subjective c/o worsening SOB  - thoracentesis completed- 1200 ml removed.  SOB with significant improvement.   - Cell count reviewed and negative for infection.   - resp even and non labored.  O2 sat 94-98%.  - Repeat thora 1/19 negative for infection per cell count  - Reaccumulating fluid on xray 1/21.

## 2018-01-24 NOTE — ASSESSMENT & PLAN NOTE
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, CMV D+/R+, steroid induction, on maintenance tacro/MMF/pred; c/b seizures, LAURA, superificial wound infection s/p wound vac to [facility] through 1/12, and several recent admissions on 11/24 and 12/3 with culture negative peritonitis with peak WBC ~5k due to suspected indolent biliary leakage s/p sphincterotomy/biliary stent placement over CBD stricture on 12/6 ERCP although notably with no overt leak noted during procedure) who was admitted on 1/11/2017 with acute onset fevers and acute on chronic N/V/abdominal pain (RLQ worst) in the setting of persistent noninflammatory ascites of unknown etiology. He underwent drainage of pleural fluid and ascites that did not show evidence of gross infection and his fevers initially abated on empiric cefepime that was stopped due to a lack of bacterial process found during evaluation. His fevers and symptoms have recurred off antibiotics and ERCP failed to reveal significant biliary pathology as the cause of his FUO. He remains tenuous and repeat taps have yet to show an etiology of his fevers/FTT.     - paracentesis not done yesterday, there was no safe fluid collection identified   - follow liver biopsy results  - fungal markers negative   - continue empiric vancomycin/cefepime for now

## 2018-01-24 NOTE — SUBJECTIVE & OBJECTIVE
Interval History: afebrile, mild soreness over abdomen, otherwise feeling well    Review of Systems   Constitutional: Negative for chills and fever.   HENT: Negative for sore throat.    Respiratory: Negative for cough, chest tightness and shortness of breath.    Cardiovascular: Negative for chest pain, palpitations and leg swelling.   Gastrointestinal: Negative for abdominal distention, abdominal pain, diarrhea, nausea and vomiting.   Genitourinary: Negative for dysuria, flank pain and urgency.   Skin: Negative for rash.   Neurological: Negative for dizziness, light-headedness and numbness.   Psychiatric/Behavioral: Negative for confusion.     Objective:     Vital Signs (Most Recent):  Temp: 98.6 °F (37 °C) (01/24/18 1145)  Pulse: 84 (01/24/18 1145)  Resp: 17 (01/24/18 1145)  BP: 113/67 (01/24/18 1145)  SpO2: 96 % (01/24/18 1145) Vital Signs (24h Range):  Temp:  [98.6 °F (37 °C)-100.3 °F (37.9 °C)] 98.6 °F (37 °C)  Pulse:  [75-89] 84  Resp:  [14-20] 17  SpO2:  [95 %-98 %] 96 %  BP: (103-130)/(64-77) 113/67     Weight: 70.1 kg (154 lb 8.7 oz)  Body mass index is 24.94 kg/m².    Estimated Creatinine Clearance: 32 mL/min (based on SCr of 3.1 mg/dL (H)).    Physical Exam   Constitutional: He is oriented to person, place, and time. No distress.   HENT:   Mouth/Throat: No oropharyngeal exudate.   Eyes: No scleral icterus.   Cardiovascular: Normal rate and regular rhythm.    Pulmonary/Chest: Effort normal and breath sounds normal. No respiratory distress. He has no wheezes. He has no rales.   Abdominal: Soft. There is no tenderness. There is no rebound.   Surgical wound on right side still slighlty open (clean borders, no erythema, no secretions), left side of surgical wound healing appropriately    Musculoskeletal: He exhibits no edema.   Lymphadenopathy:     He has no cervical adenopathy.   Neurological: He is alert and oriented to person, place, and time. No cranial nerve deficit.   Skin: He is not diaphoretic.        Significant Labs:   Bilirubin:   Recent Labs  Lab 12/27/17  0755 01/02/18  0805 01/05/18  0805  01/12/18  0627  01/20/18  0500 01/21/18  0500 01/22/18  0430 01/23/18  0400 01/24/18  0500   BILIDIR 1.8* 2.3* 2.2*  --  2.0*  --   --   --   --   --   --    BILITOT 2.2* 3.0* 3.2*  < > 2.7*  < > 2.5* 2.6* 2.9* 2.8* 2.9*   < > = values in this interval not displayed.  Blood Culture:   Recent Labs  Lab 01/11/18  0205 01/17/18  1650 01/17/18  1651 01/18/18  1922 01/18/18 1927   LABBLOO No growth after 5 days. No growth after 5 days. No growth after 5 days. No growth after 5 days. No growth after 5 days.     BMP:   Recent Labs  Lab 01/24/18  0500   GLU 85   *   K 4.1      CO2 25   BUN 18   CREATININE 3.1*   CALCIUM 8.8   MG 1.8     CBC:   Recent Labs  Lab 01/23/18  0400 01/24/18  0500 01/24/18  0742   WBC 4.84 4.11  --    HGB 7.9* 7.5* 7.5*   HCT 22.7* 22.4* 22.9*    166  --      CMP:   Recent Labs  Lab 01/23/18  0400 01/24/18  0500   * 135*   K 4.2 4.1   CL 96 100   CO2 24 25   GLU 90 85   BUN 33* 18   CREATININE 4.7* 3.1*   CALCIUM 9.2 8.8   PROT 5.3* 5.4*   ALBUMIN 2.0* 2.0*   BILITOT 2.8* 2.9*   ALKPHOS 154* 175*   AST 24 27   ALT 6* 7*   ANIONGAP 11 10   EGFRNONAA 15.7* 26.0*     Microbiology Results (last 7 days)     Procedure Component Value Units Date/Time    Culture, Anaerobic [[id number]] Collected:  01/19/18 1140    Order Status:  Completed Specimen:  Pleural Fluid from Pleural Fluid Updated:  01/24/18 0909     Anaerobic Culture Culture in progress    Blood culture [[id number]] Collected:  01/18/18 1927    Order Status:  Completed Specimen:  Blood Updated:  01/23/18 2212     Blood Culture, Routine No growth after 5 days.    Blood culture [[id number]] Collected:  01/18/18 1922    Order Status:  Completed Specimen:  Blood Updated:  01/23/18 2212     Blood Culture, Routine No growth after 5 days.    Blood culture [[id number]] Collected:  01/17/18 1651    Order Status:  Completed Specimen:   Blood Updated:  01/22/18 2012     Blood Culture, Routine No growth after 5 days.    Blood culture [[id number]] Collected:  01/17/18 1650    Order Status:  Completed Specimen:  Blood Updated:  01/22/18 2012     Blood Culture, Routine No growth after 5 days.    Culture, Fluid  (Aerobic) [[id number]]     Order Status:  No result Specimen:  Body Fluid     Gram stain [[id number]]     Order Status:  No result Specimen:  Body Fluid     Aerobic culture [[id number]]     Order Status:  No result Specimen:  Biopsy from Liver     AFB Culture & Smear [[id number]] Collected:  01/19/18 1101    Order Status:  Completed Specimen:  Abdominal from Ascites Updated:  01/22/18 1424     AFB Culture & Smear Culture in progress     AFB CULTURE STAIN No acid fast bacilli seen.    Aerobic culture [[id number]] Collected:  01/19/18 1140    Order Status:  Completed Specimen:  Pleural Fluid from Pleural Fluid Updated:  01/22/18 0949     Aerobic Bacterial Culture No growth    Culture, Fluid  (Aerobic) [[id number]] Collected:  01/19/18 1101    Order Status:  Completed Specimen:  Abdominal from Ascites Updated:  01/22/18 0949     AEROBIC CULTURE - FLUID No growth     Gram Stain Result No WBC's      No organisms seen    Cryptococcal antigen [[id number]] Collected:  01/20/18 0500    Order Status:  Completed Specimen:  Blood from Blood Updated:  01/20/18 1258     Cryptococcal Ag, Blood Negative    Urine culture [[id number]] Collected:  01/18/18 1903    Order Status:  Completed Specimen:  Urine from Urine, Clean Catch Updated:  01/19/18 2335     Urine Culture, Routine No growth    Gram stain [[id number]] Collected:  01/19/18 1140    Order Status:  Completed Specimen:  Pleural Fluid from Pleural Fluid Updated:  01/19/18 1454     Gram Stain Result No WBC's      No organisms seen    Gram stain [[id number]] Collected:  01/19/18 1101    Order Status:  Canceled Specimen:  Abdominal from Ascites Updated:  01/19/18 1332    Fungus culture [[id number]] Collected:  01/19/18  1101    Order Status:  Sent Specimen:  Abdominal from Ascites Updated:  01/19/18 1331    Fungus culture [[id number]] Collected:  01/19/18 1140    Order Status:  Sent Specimen:  Pleural Fluid from Pleural Fluid Updated:  01/19/18 1329    Culture, Anaerobic [[id number]] Collected:  01/11/18 1307    Order Status:  Completed Specimen:  Pleural Fluid from Lung, Right Updated:  01/19/18 0718     Anaerobic Culture No anaerobes isolated    Culture, Anaerobe [[id number]] Collected:  01/11/18 1448    Order Status:  Completed Specimen:  Pleural Fluid from Lung, Left Updated:  01/19/18 0718     Anaerobic Culture No anaerobes isolated    Blood culture [[id number]]     Order Status:  Canceled Specimen:  Blood     Blood culture [[id number]]     Order Status:  Canceled Specimen:  Blood     Fungus culture [[id number]] Collected:  01/11/18 1441    Order Status:  Completed Specimen:  Ascites from Ascites Updated:  01/18/18 1038     Fungus (Mycology) Culture Culture in progress    (rule out SBP) Culture, Anaerobic [[id number]] Collected:  01/11/18 1308    Order Status:  Completed Specimen:  Ascites from Ascites Updated:  01/18/18 0805     Anaerobic Culture No anaerobes isolated    Narrative:       To rule out SBP order labs: Aerobic culture [[id number]],  Culture, Anaerobic [[id number]], Gram stain [[id number]], Albumin  [[id number]], Protein [[id number]], LDH [[id number]], WBC \T\ Dff  [[id number]].          Significant Imaging: I have reviewed all pertinent imaging results/findings within the past 24 hours.

## 2018-01-24 NOTE — PROGRESS NOTES
[facility]  Liver Transplant  Progress Note    Patient Name: Jhonny Diana  MRN: [id number]  Admission Date: 2018  Hospital Length of Stay: 13 days  Code Status: Full Code  Primary Care Provider: Primary Doctor No  Post-Operative Day: 97    ORGAN:   LIVER  Disease Etiology: Acute Alcoholic Hepatitis  Donor Type:    - Brain Death  CDC High Risk:   No  Donor CMV Status:   Donor CMV Status: Positive  Donor HBcAB:   Negative  Donor HCV Status:   Negative  Whole or Partial: Whole Liver  Biliary Anastomosis: End to End  Arterial Anatomy: Standard  Subjective:     History of Present Illness:  Jhonny Diana is a 26 y/o male with past medical history of alcoholic cirrhosis.  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 Found to have peritonitis in November (WBC 5000, 75% PNM) neg for bile leak. He was treated initially with vanc/cefepime. Repeat cell counts  with some improvement (WBC 1400, 45% PNM). He has undergone multiple paracenteses as well as abscess drainage of perihepatic fluid collections and treated with antimicrobial therapy but no positive cultures. Of note, biliary stricture also identified and ERCP performed on 2017 with sphincterotomy and biliary stent placed. Liver tests still have not normalized despite intervention, bilirubin and AP remain elevated. Other pertinent PMH current wound vac in place 2/2 wound infection, malnutrition requiring TPN for short course and ongoing hypoalbuminemia, and seizure activity while on prograf and has since been switched to cyclosporine without reoccurrence.  He presented to the ER for fever, abdominal pain, and N/V. He reports fever (103) for 1  day prior. Overnight, he developed N/V, reports small amount of green emesis with new left sided pain. He also endorses worsening SOB with exertion. He was scheduled as an outpatient for follow up paracentesis and IR drainage of fluid collection. CXR in ER shows large pleural effusion with subsegmental atelectasis. Infectious work up initiated in ER. His ANC is 900. Broad spectrum antibiotics initiated in ED. He denies chest pain, palpitations, diarrhea, constipation, or back pain. Denies any sick contacts.    Hospital Course:  Thoracentesis (1.2L off), Paracentesis (1.6L off), and IR drainage of fluid collection 1/11, all fluids negative for infection. ID consulted. Broad spectrum antibiotics d/c'd 1/15. Chronically malnourished with poor PO intake, prealbumin 7. Alex tube placed 1/15 for tube feedings.     ERCP 1/17 with sludge and a biliary stone which was removed and stent exchanged.  Remained with n/v; therefore, TF remained on hold and TPN continued.   Pt with fever s/p ERCP on 1/17 which continued until 1/19.  Vanc/cefepime restarted.  Blood cx 1/17 and 1/18 NGTD.  ID reconsulted.  Fungal markers sent.  CT C/A/P obtained.  With large R pleural effusion and ascites- both drained 1/19 and negative for infx per cell count. Pt afebrile 1/20.     Interval History: No acute events over night.  Tolerated HD yesterday. Liver biopsy pending from 1/23. No fluid for safe paracentesis yesterday. No BM since this past weekend and pt continues to have nausea and abdominal fullness. GI consulted. Continue TPN.  Encouraged ambulation multiple times daily. Monitor.     Scheduled Meds:   atovaquone  1,500 mg Per NG tube Daily    ceFEPime (MAXIPIME) IVPB  1 g Intravenous Q12H    cycloSPORINE modified  150 mg Oral BID    docusate sodium  100 mg Oral TID PC    [START ON 1/25/2018] epoetin maurisio (PROCRIT) injection  10,000 Units Intravenous Every Tues, Thurs, Sat    fat emulsion 20%  250 mL Intravenous Daily    heparin  (porcine)  5,000 Units Subcutaneous Q12H    levETIRAcetam  500 mg Oral BID    levothyroxine  75 mcg Oral Before breakfast    omeprazole  40 mg Oral Before breakfast    polyethylene glycol  17 g Oral Daily    ursodiol  300 mg Oral BID     Continuous Infusions:   TPN ADULT CENTRAL LINE CUSTOM 40 mL/hr at 01/23/18 2124    TPN ADULT CENTRAL LINE CUSTOM       PRN Meds:sodium chloride, sodium chloride 0.9%, sodium chloride 0.9%, sodium chloride 0.9%, sodium chloride 0.9%, acetaminophen, heparin (porcine), omnipaque, ondansetron, ondansetron, oxyCODONE, oxyCODONE, prochlorperazine, simethicone, sodium chloride 0.9%    Review of Systems   Constitutional: Positive for activity change, appetite change and fatigue. Negative for chills and fever.   HENT: Negative.  Negative for congestion and facial swelling.    Eyes: Negative for pain, discharge and visual disturbance.   Respiratory: Negative for cough, shortness of breath and wheezing.    Cardiovascular: Positive for leg swelling. Negative for chest pain.   Gastrointestinal: Positive for abdominal distention, abdominal pain and constipation. Negative for nausea and vomiting.   Genitourinary: Positive for decreased urine volume. Negative for difficulty urinating.        Anuric   Musculoskeletal: Negative for arthralgias, back pain and myalgias.   Skin: Positive for wound. Negative for color change and rash.   Allergic/Immunologic: Positive for immunocompromised state.   Neurological: Positive for weakness. Negative for dizziness, seizures and headaches.   Psychiatric/Behavioral: Positive for decreased concentration and dysphoric mood. Negative for confusion. The patient is not nervous/anxious.    All other systems reviewed and are negative.    Objective:     Vital Signs (Most Recent):  Temp: 98.9 °F (37.2 °C) (01/24/18 1542)  Pulse: 80 (01/24/18 1542)  Resp: 17 (01/24/18 1542)  BP: 114/74 (01/24/18 1542)  SpO2: 98 % (01/24/18 1542) Vital Signs (24h Range):  Temp:  [98.6  °F (37 °C)-100.3 °F (37.9 °C)] 98.9 °F (37.2 °C)  Pulse:  [75-89] 80  Resp:  [16-20] 17  SpO2:  [95 %-98 %] 98 %  BP: (109-130)/(64-77) 114/74     Weight: 70.1 kg (154 lb 8.7 oz)  Body mass index is 24.94 kg/m².    Intake/Output - Last 3 Shifts       01/22 0700 - 01/23 0659 01/23 0700 - 01/24 0659 01/24 0700 - 01/25 0659    P.O. 820 360 120    Other  700     IV Piggyback 600  50    .6      Total Intake(mL/kg) 2122.6 (30.3) 1060 (15.1) 170 (2.4)    Urine (mL/kg/hr) 0 (0) 0 (0)     Emesis/NG output 0 (0) 0 (0)     Other 0 (0) 3700 (2.2)     Stool 0 (0)      Blood 0 (0)      Total Output 0 3700      Net +2122.6 -2640 +170           Urine Occurrence 1 x      Stool Occurrence 0 x      Emesis Occurrence 0 x            Physical Exam   Constitutional: He is oriented to person, place, and time. He appears well-developed. No distress.   Temporal and distal extremity muscle wasting   HENT:   Head: Normocephalic and atraumatic.   Mouth/Throat: No oropharyngeal exudate.   Eyes: EOM are normal. Pupils are equal, round, and reactive to light. Scleral icterus is present.   Neck: Normal range of motion. Neck supple. No JVD present. No thyromegaly present.   Cardiovascular: Normal rate, regular rhythm, normal heart sounds and intact distal pulses.    No murmur heard.  Pulmonary/Chest: Effort normal. No respiratory distress. He has decreased breath sounds in the right middle field, the right lower field and the left lower field. He has no wheezes. He exhibits no tenderness.   Diminished to RLL   Abdominal: Soft. Bowel sounds are normal. He exhibits distension and ascites. There is tenderness (mild). There is no rebound and no guarding.   Dressing to chevron.  Wd vac removed 1/12/18  Dependent edema present R flank; surrounding erythema   Musculoskeletal: Normal range of motion. He exhibits edema (2+ LE edema). He exhibits no tenderness.   Neurological: He is alert and oriented to person, place, and time. He has normal reflexes.    Skin: Skin is warm and dry. Capillary refill takes 2 to 3 seconds. He is not diaphoretic. No erythema.   jaundice   Psychiatric: He has a normal mood and affect. His behavior is normal. Judgment and thought content normal. His mood appears not anxious.   Depressed mood   Nursing note and vitals reviewed.      Laboratory:  Immunosuppressants         Stop Route Frequency     cycloSPORINE modified capsule 150 mg      -- Oral 2 times daily        CBC:   Recent Labs  Lab 01/24/18  0500 01/24/18  0742   WBC 4.11  --    RBC 2.45*  --    HGB 7.5* 7.5*   HCT 22.4* 22.9*     --    MCV 91  --    MCH 30.6  --    MCHC 33.5  --      CMP:   Recent Labs  Lab 01/24/18  0500   GLU 85   CALCIUM 8.8   ALBUMIN 2.0*   PROT 5.4*   *   K 4.1   CO2 25      BUN 18   CREATININE 3.1*   ALKPHOS 175*   ALT 7*   AST 27   BILITOT 2.9*     Labs within the past 24 hours have been reviewed.    Diagnostic Results:  I have personally reviewed all pertinent imaging studies.    Assessment/Plan:     * Fever    - unclear source of fever, previously thought to be related to peritonitis  - blood cultures prelim NGTD. No urine cx obtained as pt anuric.   - Pt underwent thoracentesis, paracentesis, and drain placement into fluid collection 1/11/18.   - All fluids reviewed and negative for infection. Cultures NGTD.  - ID was following have since signed off. Cefepime d/c 1/15.   - Remains febrile since 1/17. Repeat blood cx NGTD. Resumed Vanc/Cefepime given fever 1/17.  - ID reconsulted --> fungal markers pending (crypto Ag, Aspergillus Ag, Fungitell Assay, Histo Ag)  - CT C/A/P with large R pleural effusion and ascites --> both drained 1/19 and negative for infection  - no fluid for para on 1/23.  - liver bx pending 1/23.        Delayed surgical wound healing    - wd vac removed 1/12/18.  Wound healing well. Aquacel AG and Mepilex border dressing applied via wound care recs.           Liver transplanted    - Post op course complicated by  fevers and hyperbilirubinemia.  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  - AST/ALT normal. Tbili/alkphos remain elevated.  - Liver US 1/8 showed 3.9 cm complex fluid collection anterior to right lobe and moderate nonspecific complex ascites inferior to transplant.   - IR placed drain 1/11/18, cell count negative for infection.  - PBS consulted. PBS not comfortable proceeding with EUS with liver biopsy given fever and possible infectious process going on. ERCP 1/17 with stone and sludge. Pt placed on Actigall.  - Bili remains elevated.  - Liver biopsy 1/23 results pending.        Long-term use of immunosuppressant medication    - Maintenance IS with cyclosporine. MMF on hold for infections and neutropenia. Continue to check cyclosporine level daily. Assess for toxicity and adjust level as needed.        Prophylactic immunotherapy    - See long term use of immunosuppression.         At risk for opportunistic infections    - Hold Valcyte as WBC low.  CMV PCR 1/11, 1/18 undetected.  - bactrim held.          Anemia of chronic disease    - PRBC x 1 1/19  - PRBC x 1 1/21  - Continue daily CBC.  - H&H trending down but stable.  - consider blood transfusion with next HD.        Constipation    - Enema ordered 1/20 and 1/21 with BM  - Encourage ambulation.            Other ascites    - Paracentesis performed 1/11/18 with 1600 ml removed.    - fluid negative for infection.   - Repeat para 1/19 negative for infection per cell count  - no fluid seen for paracentesis on 1/23.        Severe protein-calorie malnutrition    - Poor PO intake since transplant requiring short course of TPN during previous hospital stay.   - albumin remains decreased but appetite slowly improving per pt.   - Dietary consulted. Will need to closely monitor PO intake.   - supplements also ordered.   - SHANE with tube feeding start 1/16. Stopped 1/17/18, was not tolerating. [place] removed 1/20.  - prealbumin 7 on 1/15.  - TPN started  1/16/18.   - Pt able to eat minimally 1/21 and 1/22 which is an improvement.        Neutropenia    - Infectious work up negative so far, dc antibiotics 1/15.  - CMV PCR from 1/11, 1/18 not detected. Monitor.  - Neupogen given 1/16/18 with improvement in ANC seen.         Nausea and vomiting    - h/o constipation, reports having regular BM's with bowel regimen  - vomiting episode x 1 at home. Anti-emetics ordered PRN.  - KUB repeated 1/14/18 given increased abdominal pain- mild, generalized bowel distention suggesting ileus.  Diet changed to clears as tolerated for bowel rest   - SHANE tube placed 1/16 for tube feeds. Not tolerating tube feeds, stopped 1/17/18.  - D/C shane 1/19  - chronic constipation, Cont bowel regimen.   - GI consulted.          Pleural effusion, right    - CXR in ER with large right pleural effusion with subjective c/o worsening SOB  - thoracentesis completed- 1200 ml removed.  SOB with significant improvement.   - Cell count reviewed and negative for infection.   - resp even and non labored.  O2 sat 94-98%.  - Repeat thora 1/19 negative for infection per cell count  - Reaccumulating fluid on xray 1/21.        Physical deconditioning    - PT ordered.          Acute renal failure with tubular necrosis    - HD resumed on previous admission. Now anuric and dialyzes M-W-F.  - Nephrology following.    - HD Sat since held Friday for thora/para.  - HD transitioned to TTS schedule.            VTE Risk Mitigation         Ordered     heparin (porcine) injection 1,000 Units  As needed (PRN)     Route:  Intra-Catheter        01/12/18 1017     heparin (porcine) injection 5,000 Units  Every 12 hours     Route:  Subcutaneous        01/12/18 0926     Medium Risk of VTE  Once      01/11/18 0351     Place sequential compression device  Until discontinued      01/11/18 0351          The patients clinical status was discussed at multidisplinary rounds, involving transplant surgery, transplant medicine, pharmacy,  nursing, nutrition, and social work    Discharge Planning: not a candidate for dc at this time.      Mandy Crespo NP  Liver Transplant  [facility]

## 2018-01-24 NOTE — PLAN OF CARE
Problem: Patient Care Overview  Goal: Plan of Care Review  Outcome: Ongoing (interventions implemented as appropriate)  Pt AAO X 4, mother at the bedside. VSS, see flowsheet for further assessment details.    Pt afebrile overnight. IV Cefepime administered as ordered.    Pain mgmt in progress; pain well controlled with PO pain medications    Pt denies having any n/v overnight    I/Os monitored. 0 UO overnight. HD performed on 1/23; 3L removed.    Liver Bx 1/23; see chart for details.    Pt up with assist; no new skin breakdown noted; chevron dressing remains intact, wound care following.    Fall precautions in place; pt remains free of injury. Daily labs monitored. No acute events overnight.

## 2018-01-24 NOTE — ASSESSMENT & PLAN NOTE
- HD resumed on previous admission. Now anuric and dialyzes M-W-F.  - Nephrology following.    - HD Sat since held Friday for thora/para.  - HD transitioned to TTS schedule.

## 2018-01-25 ENCOUNTER — ANESTHESIA EVENT (OUTPATIENT)
Dept: ENDOSCOPY | Facility: HOSPITAL | Age: 28
DRG: 628 | End: 2018-01-25
Payer: COMMERCIAL

## 2018-01-25 ENCOUNTER — ANESTHESIA (OUTPATIENT)
Dept: ENDOSCOPY | Facility: HOSPITAL | Age: 28
DRG: 628 | End: 2018-01-25
Payer: COMMERCIAL

## 2018-01-25 PROBLEM — R53.81 PHYSICAL DECONDITIONING: Status: RESOLVED | Noted: 2017-12-01 | Resolved: 2018-01-25

## 2018-01-25 LAB
ALBUMIN SERPL BCP-MCNC: 2 G/DL
ALP SERPL-CCNC: 168 U/L
ALT SERPL W/O P-5'-P-CCNC: 5 U/L
ANION GAP SERPL CALC-SCNC: 11 MMOL/L
ANISOCYTOSIS BLD QL SMEAR: SLIGHT
AST SERPL-CCNC: 25 U/L
BASOPHILS NFR BLD: 1 %
BILIRUB SERPL-MCNC: 2.8 MG/DL
BUN SERPL-MCNC: 25 MG/DL
CALCIUM SERPL-MCNC: 9.1 MG/DL
CHLORIDE SERPL-SCNC: 98 MMOL/L
CO2 SERPL-SCNC: 24 MMOL/L
CREAT SERPL-MCNC: 4.1 MG/DL
CYCLOSPORINE BLD LC/MS/MS-MCNC: 250 NG/ML
DIFFERENTIAL METHOD: ABNORMAL
EOSINOPHIL NFR BLD: 6 %
ERYTHROCYTE [DISTWIDTH] IN BLOOD BY AUTOMATED COUNT: 15.9 %
EST. GFR  (AFRICAN AMERICAN): 21.4 ML/MIN/1.73 M^2
EST. GFR  (NON AFRICAN AMERICAN): 18.5 ML/MIN/1.73 M^2
GLUCOSE SERPL-MCNC: 96 MG/DL
HCT VFR BLD AUTO: 23 %
HGB BLD-MCNC: 7.8 G/DL
IMM GRANULOCYTES # BLD AUTO: ABNORMAL K/UL
IMM GRANULOCYTES NFR BLD AUTO: ABNORMAL %
LYMPHOCYTES NFR BLD: 26 %
MAGNESIUM SERPL-MCNC: 2 MG/DL
MCH RBC QN AUTO: 31.5 PG
MCHC RBC AUTO-ENTMCNC: 33.9 G/DL
MCV RBC AUTO: 93 FL
METAMYELOCYTES NFR BLD MANUAL: 1 %
MONOCYTES NFR BLD: 8 %
MYELOCYTES NFR BLD MANUAL: 1 %
NEUTROPHILS NFR BLD: 55 %
NEUTS BAND NFR BLD MANUAL: 2 %
NRBC BLD-RTO: 0 /100 WBC
OVALOCYTES BLD QL SMEAR: ABNORMAL
PHOSPHATE SERPL-MCNC: 4.7 MG/DL
PLATELET # BLD AUTO: 185 K/UL
PLATELET BLD QL SMEAR: ABNORMAL
PMV BLD AUTO: 10.3 FL
POIKILOCYTOSIS BLD QL SMEAR: SLIGHT
POTASSIUM SERPL-SCNC: 4.1 MMOL/L
PROT SERPL-MCNC: 5.6 G/DL
RBC # BLD AUTO: 2.48 M/UL
SCHISTOCYTES BLD QL SMEAR: ABNORMAL
SCHISTOCYTES BLD QL SMEAR: PRESENT
SODIUM SERPL-SCNC: 133 MMOL/L
WBC # BLD AUTO: 4.76 K/UL

## 2018-01-25 PROCEDURE — 88305 TISSUE EXAM BY PATHOLOGIST: CPT | Mod: 26,,, | Performed by: PATHOLOGY

## 2018-01-25 PROCEDURE — 63600175 PHARM REV CODE 636 W HCPCS: Performed by: HOSPITALIST

## 2018-01-25 PROCEDURE — 25000003 PHARM REV CODE 250: Performed by: NURSE ANESTHETIST, CERTIFIED REGISTERED

## 2018-01-25 PROCEDURE — 80158 DRUG ASSAY CYCLOSPORINE: CPT

## 2018-01-25 PROCEDURE — 90935 HEMODIALYSIS ONE EVALUATION: CPT

## 2018-01-25 PROCEDURE — 80053 COMPREHEN METABOLIC PANEL: CPT

## 2018-01-25 PROCEDURE — 37000009 HC ANESTHESIA EA ADD 15 MINS: Performed by: INTERNAL MEDICINE

## 2018-01-25 PROCEDURE — 20600001 HC STEP DOWN PRIVATE ROOM

## 2018-01-25 PROCEDURE — 37000008 HC ANESTHESIA 1ST 15 MINUTES: Performed by: INTERNAL MEDICINE

## 2018-01-25 PROCEDURE — 0DB78ZX EXCISION OF STOMACH, PYLORUS, VIA NATURAL OR ARTIFICIAL OPENING ENDOSCOPIC, DIAGNOSTIC: ICD-10-PCS | Performed by: INTERNAL MEDICINE

## 2018-01-25 PROCEDURE — 25000003 PHARM REV CODE 250: Performed by: NURSE PRACTITIONER

## 2018-01-25 PROCEDURE — B4185 PARENTERAL SOL 10 GM LIPIDS: HCPCS | Performed by: NURSE PRACTITIONER

## 2018-01-25 PROCEDURE — 63600175 PHARM REV CODE 636 W HCPCS: Performed by: NURSE PRACTITIONER

## 2018-01-25 PROCEDURE — 0DB98ZX EXCISION OF DUODENUM, VIA NATURAL OR ARTIFICIAL OPENING ENDOSCOPIC, DIAGNOSTIC: ICD-10-PCS | Performed by: INTERNAL MEDICINE

## 2018-01-25 PROCEDURE — 88305 TISSUE EXAM BY PATHOLOGIST: CPT | Performed by: PATHOLOGY

## 2018-01-25 PROCEDURE — 27201012 HC FORCEPS, HOT/COLD, DISP: Performed by: INTERNAL MEDICINE

## 2018-01-25 PROCEDURE — 43239 EGD BIOPSY SINGLE/MULTIPLE: CPT | Mod: ,,, | Performed by: INTERNAL MEDICINE

## 2018-01-25 PROCEDURE — 99233 SBSQ HOSP IP/OBS HIGH 50: CPT | Mod: ,,, | Performed by: NURSE PRACTITIONER

## 2018-01-25 PROCEDURE — 25000003 PHARM REV CODE 250: Performed by: PHYSICIAN ASSISTANT

## 2018-01-25 PROCEDURE — 99233 SBSQ HOSP IP/OBS HIGH 50: CPT | Mod: ,,, | Performed by: INTERNAL MEDICINE

## 2018-01-25 PROCEDURE — 43239 EGD BIOPSY SINGLE/MULTIPLE: CPT | Performed by: INTERNAL MEDICINE

## 2018-01-25 PROCEDURE — A4217 STERILE WATER/SALINE, 500 ML: HCPCS | Performed by: NURSE PRACTITIONER

## 2018-01-25 PROCEDURE — 63600175 PHARM REV CODE 636 W HCPCS: Performed by: NURSE ANESTHETIST, CERTIFIED REGISTERED

## 2018-01-25 PROCEDURE — 90935 HEMODIALYSIS ONE EVALUATION: CPT | Mod: ,,, | Performed by: NURSE PRACTITIONER

## 2018-01-25 PROCEDURE — 83735 ASSAY OF MAGNESIUM: CPT

## 2018-01-25 PROCEDURE — 84100 ASSAY OF PHOSPHORUS: CPT

## 2018-01-25 PROCEDURE — D9220A PRA ANESTHESIA: Mod: ,,, | Performed by: ANESTHESIOLOGY

## 2018-01-25 PROCEDURE — C1773 RET DEV, INSERTABLE: HCPCS | Performed by: INTERNAL MEDICINE

## 2018-01-25 PROCEDURE — 96372 THER/PROPH/DIAG INJ SC/IM: CPT

## 2018-01-25 PROCEDURE — 63600175 PHARM REV CODE 636 W HCPCS: Performed by: PHYSICIAN ASSISTANT

## 2018-01-25 RX ORDER — OXYCODONE HYDROCHLORIDE 5 MG/1
10 TABLET ORAL ONCE
Status: COMPLETED | OUTPATIENT
Start: 2018-01-25 | End: 2018-01-25

## 2018-01-25 RX ORDER — OMEPRAZOLE 40 MG/1
40 CAPSULE, DELAYED RELEASE ORAL
Status: DISCONTINUED | OUTPATIENT
Start: 2018-01-25 | End: 2018-02-05

## 2018-01-25 RX ORDER — PROPOFOL 10 MG/ML
VIAL (ML) INTRAVENOUS
Status: DISCONTINUED | OUTPATIENT
Start: 2018-01-25 | End: 2018-01-25

## 2018-01-25 RX ORDER — PROPOFOL 10 MG/ML
VIAL (ML) INTRAVENOUS CONTINUOUS PRN
Status: DISCONTINUED | OUTPATIENT
Start: 2018-01-25 | End: 2018-01-25

## 2018-01-25 RX ORDER — SODIUM CHLORIDE 9 MG/ML
INJECTION, SOLUTION INTRAVENOUS
Status: DISCONTINUED | OUTPATIENT
Start: 2018-01-25 | End: 2018-01-27

## 2018-01-25 RX ORDER — LIDOCAINE HCL/PF 100 MG/5ML
SYRINGE (ML) INTRAVENOUS
Status: DISCONTINUED | OUTPATIENT
Start: 2018-01-25 | End: 2018-01-25

## 2018-01-25 RX ORDER — SODIUM CHLORIDE 9 MG/ML
INJECTION, SOLUTION INTRAVENOUS CONTINUOUS PRN
Status: DISCONTINUED | OUTPATIENT
Start: 2018-01-25 | End: 2018-01-25

## 2018-01-25 RX ORDER — SODIUM CHLORIDE 9 MG/ML
INJECTION, SOLUTION INTRAVENOUS ONCE
Status: COMPLETED | OUTPATIENT
Start: 2018-01-25 | End: 2018-01-25

## 2018-01-25 RX ORDER — SODIUM CHLORIDE 0.9 % (FLUSH) 0.9 %
3 SYRINGE (ML) INJECTION
Status: DISCONTINUED | OUTPATIENT
Start: 2018-01-25 | End: 2018-02-22

## 2018-01-25 RX ORDER — OXYCODONE HYDROCHLORIDE 5 MG/1
5 TABLET ORAL EVERY 4 HOURS PRN
Status: DISCONTINUED | OUTPATIENT
Start: 2018-01-25 | End: 2018-02-07

## 2018-01-25 RX ORDER — POLYETHYLENE GLYCOL 3350 17 G/17G
17 POWDER, FOR SOLUTION ORAL 2 TIMES DAILY
Status: DISCONTINUED | OUTPATIENT
Start: 2018-01-25 | End: 2018-04-03

## 2018-01-25 RX ORDER — CEFEPIME HYDROCHLORIDE 1 G/1
1 INJECTION, POWDER, FOR SOLUTION INTRAMUSCULAR; INTRAVENOUS
Status: DISCONTINUED | OUTPATIENT
Start: 2018-01-26 | End: 2018-01-26

## 2018-01-25 RX ADMIN — URSODIOL 300 MG: 300 CAPSULE ORAL at 09:01

## 2018-01-25 RX ADMIN — SODIUM CHLORIDE: 0.9 INJECTION, SOLUTION INTRAVENOUS at 01:01

## 2018-01-25 RX ADMIN — CYCLOSPORINE 150 MG: 100 CAPSULE, LIQUID FILLED ORAL at 06:01

## 2018-01-25 RX ADMIN — SODIUM CHLORIDE 350 ML: 0.9 INJECTION, SOLUTION INTRAVENOUS at 08:01

## 2018-01-25 RX ADMIN — LEVETIRACETAM 500 MG: 500 TABLET ORAL at 09:01

## 2018-01-25 RX ADMIN — SOYBEAN OIL 250 ML: 20 INJECTION, SOLUTION INTRAVENOUS at 09:01

## 2018-01-25 RX ADMIN — PROPOFOL 200 MCG/KG/MIN: 10 INJECTION, EMULSION INTRAVENOUS at 01:01

## 2018-01-25 RX ADMIN — MAGNESIUM SULFATE HEPTAHYDRATE: 500 INJECTION, SOLUTION INTRAMUSCULAR; INTRAVENOUS at 09:01

## 2018-01-25 RX ADMIN — LEVOTHYROXINE SODIUM 75 MCG: 75 TABLET ORAL at 05:01

## 2018-01-25 RX ADMIN — HEPARIN SODIUM 5000 UNITS: 5000 INJECTION, SOLUTION INTRAVENOUS; SUBCUTANEOUS at 09:01

## 2018-01-25 RX ADMIN — OXYCODONE HYDROCHLORIDE 10 MG: 5 TABLET ORAL at 08:01

## 2018-01-25 RX ADMIN — OXYCODONE HYDROCHLORIDE 10 MG: 5 TABLET ORAL at 06:01

## 2018-01-25 RX ADMIN — OXYCODONE HYDROCHLORIDE 5 MG: 5 TABLET ORAL at 02:01

## 2018-01-25 RX ADMIN — LIDOCAINE HYDROCHLORIDE 50 MG: 20 INJECTION, SOLUTION INTRAVENOUS at 01:01

## 2018-01-25 RX ADMIN — OXYCODONE HYDROCHLORIDE 10 MG: 5 TABLET ORAL at 03:01

## 2018-01-25 RX ADMIN — OMEPRAZOLE 40 MG: 40 CAPSULE, DELAYED RELEASE ORAL at 06:01

## 2018-01-25 RX ADMIN — CYCLOSPORINE 150 MG: 100 CAPSULE, LIQUID FILLED ORAL at 05:01

## 2018-01-25 RX ADMIN — HEPARIN SODIUM 1000 UNITS: 1000 INJECTION, SOLUTION INTRAVENOUS; SUBCUTANEOUS at 08:01

## 2018-01-25 RX ADMIN — PROPOFOL 100 MG: 10 INJECTION, EMULSION INTRAVENOUS at 01:01

## 2018-01-25 RX ADMIN — OXYCODONE HYDROCHLORIDE 5 MG: 5 TABLET ORAL at 09:01

## 2018-01-25 RX ADMIN — PROPOFOL 40 MG: 10 INJECTION, EMULSION INTRAVENOUS at 01:01

## 2018-01-25 NOTE — SUBJECTIVE & OBJECTIVE
Interval History: afebrile, mild soreness over abdomen, otherwise feeling well    Review of Systems   Constitutional: Negative for chills and fever.   HENT: Negative for sore throat.    Respiratory: Negative for cough, chest tightness and shortness of breath.    Cardiovascular: Negative for chest pain, palpitations and leg swelling.   Gastrointestinal: Negative for abdominal distention, abdominal pain, diarrhea, nausea and vomiting.   Genitourinary: Negative for dysuria, flank pain and urgency.   Skin: Negative for rash.   Neurological: Negative for dizziness, light-headedness and numbness.   Psychiatric/Behavioral: Negative for confusion.     Objective:     Vital Signs (Most Recent):  Temp: 99.4 °F (37.4 °C) (01/25/18 0748)  Pulse: 81 (01/25/18 1045)  Resp: 18 (01/25/18 0730)  BP: 122/73 (01/25/18 1045)  SpO2: 95 % (01/25/18 0730) Vital Signs (24h Range):  Temp:  [98.6 °F (37 °C)-99.4 °F (37.4 °C)] 99.4 °F (37.4 °C)  Pulse:  [68-86] 81  Resp:  [16-18] 18  SpO2:  [93 %-99 %] 95 %  BP: (113-129)/(67-79) 122/73     Weight: 70.1 kg (154 lb 8.7 oz)  Body mass index is 24.94 kg/m².    Estimated Creatinine Clearance: 24.2 mL/min (based on SCr of 4.1 mg/dL (H)).    Physical Exam   Constitutional: He is oriented to person, place, and time. No distress.   HENT:   Mouth/Throat: No oropharyngeal exudate.   Eyes: No scleral icterus.   Cardiovascular: Normal rate and regular rhythm.    Pulmonary/Chest: Effort normal and breath sounds normal. No respiratory distress. He has no wheezes. He has no rales.   Abdominal: Soft. There is no tenderness. There is no rebound.   Surgical wound on right side still slighlty open (clean borders, no erythema, no secretions), left side of surgical wound healing appropriately    Musculoskeletal: He exhibits no edema.   Lymphadenopathy:     He has no cervical adenopathy.   Neurological: He is alert and oriented to person, place, and time. No cranial nerve deficit.   Skin: He is not diaphoretic.        Significant Labs:   Bilirubin:   Recent Labs  Lab 12/27/17  0755 01/02/18  0805 01/05/18  0805  01/12/18  0627  01/21/18  0500 01/22/18  0430 01/23/18  0400 01/24/18  0500 01/25/18  0500   BILIDIR 1.8* 2.3* 2.2*  --  2.0*  --   --   --   --   --   --    BILITOT 2.2* 3.0* 3.2*  < > 2.7*  < > 2.6* 2.9* 2.8* 2.9* 2.8*   < > = values in this interval not displayed.  Blood Culture:     Recent Labs  Lab 01/11/18  0205 01/17/18  1650 01/17/18  1651 01/18/18  1922 01/18/18 1927   LABBLOO No growth after 5 days. No growth after 5 days. No growth after 5 days. No growth after 5 days. No growth after 5 days.     BMP:     Recent Labs  Lab 01/25/18  0500   GLU 96   *   K 4.1   CL 98   CO2 24   BUN 25*   CREATININE 4.1*   CALCIUM 9.1   MG 2.0     CBC:     Recent Labs  Lab 01/24/18  0500 01/24/18  0742 01/25/18  0500   WBC 4.11  --  4.76   HGB 7.5* 7.5* 7.8*   HCT 22.4* 22.9* 23.0*     --  185     CMP:     Recent Labs  Lab 01/24/18  0500 01/25/18  0500   * 133*   K 4.1 4.1    98   CO2 25 24   GLU 85 96   BUN 18 25*   CREATININE 3.1* 4.1*   CALCIUM 8.8 9.1   PROT 5.4* 5.6*   ALBUMIN 2.0* 2.0*   BILITOT 2.9* 2.8*   ALKPHOS 175* 168*   AST 27 25   ALT 7* 5*   ANIONGAP 10 11   EGFRNONAA 26.0* 18.5*     Microbiology Results (last 7 days)     Procedure Component Value Units Date/Time    Fungus culture [[id number]] Collected:  01/19/18 1101    Order Status:  Completed Specimen:  Abdominal from Ascites Updated:  01/25/18 1119     Fungus (Mycology) Culture Culture in progress    Fungus culture [[id number]] Collected:  01/19/18 1140    Order Status:  Completed Specimen:  Pleural Fluid from Pleural Fluid Updated:  01/25/18 1119     Fungus (Mycology) Culture Culture in progress    Culture, Anaerobic [[id number]] Collected:  01/19/18 1140    Order Status:  Completed Specimen:  Pleural Fluid from Pleural Fluid Updated:  01/24/18 0909     Anaerobic Culture Culture in progress    Blood culture [[id number]] Collected:   01/18/18 1927    Order Status:  Completed Specimen:  Blood Updated:  01/23/18 2212     Blood Culture, Routine No growth after 5 days.    Blood culture [[id number]] Collected:  01/18/18 1922    Order Status:  Completed Specimen:  Blood Updated:  01/23/18 2212     Blood Culture, Routine No growth after 5 days.    Blood culture [[id number]] Collected:  01/17/18 1651    Order Status:  Completed Specimen:  Blood Updated:  01/22/18 2012     Blood Culture, Routine No growth after 5 days.    Blood culture [[id number]] Collected:  01/17/18 1650    Order Status:  Completed Specimen:  Blood Updated:  01/22/18 2012     Blood Culture, Routine No growth after 5 days.    Culture, Fluid  (Aerobic) [[id number]]     Order Status:  No result Specimen:  Body Fluid     Gram stain [[id number]]     Order Status:  No result Specimen:  Body Fluid     Aerobic culture [[id number]]     Order Status:  No result Specimen:  Biopsy from Liver     AFB Culture & Smear [[id number]] Collected:  01/19/18 1101    Order Status:  Completed Specimen:  Abdominal from Ascites Updated:  01/22/18 1424     AFB Culture & Smear Culture in progress     AFB CULTURE STAIN No acid fast bacilli seen.    Aerobic culture [[id number]] Collected:  01/19/18 1140    Order Status:  Completed Specimen:  Pleural Fluid from Pleural Fluid Updated:  01/22/18 0949     Aerobic Bacterial Culture No growth    Culture, Fluid  (Aerobic) [[id number]] Collected:  01/19/18 1101    Order Status:  Completed Specimen:  Abdominal from Ascites Updated:  01/22/18 0949     AEROBIC CULTURE - FLUID No growth     Gram Stain Result No WBC's      No organisms seen    Cryptococcal antigen [[id number]] Collected:  01/20/18 0500    Order Status:  Completed Specimen:  Blood from Blood Updated:  01/20/18 1258     Cryptococcal Ag, Blood Negative    Urine culture [[id number]] Collected:  01/18/18 1903    Order Status:  Completed Specimen:  Urine from Urine, Clean Catch Updated:  01/19/18 2335     Urine Culture,  Routine No growth    Gram stain [[id number]] Collected:  01/19/18 1140    Order Status:  Completed Specimen:  Pleural Fluid from Pleural Fluid Updated:  01/19/18 1454     Gram Stain Result No WBC's      No organisms seen    Gram stain [[id number]] Collected:  01/19/18 1101    Order Status:  Canceled Specimen:  Abdominal from Ascites Updated:  01/19/18 1332    Culture, Anaerobic [[id number]] Collected:  01/11/18 1307    Order Status:  Completed Specimen:  Pleural Fluid from Lung, Right Updated:  01/19/18 0718     Anaerobic Culture No anaerobes isolated    Culture, Anaerobe [[id number]] Collected:  01/11/18 1448    Order Status:  Completed Specimen:  Pleural Fluid from Lung, Left Updated:  01/19/18 0718     Anaerobic Culture No anaerobes isolated    Blood culture [[id number]]     Order Status:  Canceled Specimen:  Blood     Blood culture [[id number]]     Order Status:  Canceled Specimen:  Blood           Significant Imaging: I have reviewed all pertinent imaging results/findings within the past 24 hours.

## 2018-01-25 NOTE — NURSING TRANSFER
Nursing Transfer Note      1/25/2018     Transfer To: [id number]    Transfer via stretcher    Transfer with pump    Transported by pct    Medicines sent: na    Chart send with patient: Yes    Notified: na    Patient reassessed at: 1413 1/25/18 (date, time)    Upon arrival to floor: bed in lowest position    Report called to floor nurse Gracia

## 2018-01-25 NOTE — ASSESSMENT & PLAN NOTE
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+, steroid induction, on maintenance tacro/MMF/pred; c/b seizures, LAURA, superificial wound infection s/p wound vac to [facility] through 1/12, and several recent admissions on 11/24 and 12/3 with culture negative peritonitis with peak WBC ~5k due to suspected indolent biliary leakage s/p sphincterotomy/biliary stent placement over CBD stricture on 12/6 ERCP although notably with no overt leak noted during procedure) who was admitted on 1/11/2017 with acute onset fevers and acute on chronic N/V/abdominal pain (RLQ worst) in the setting of persistent noninflammatory ascites of unknown etiology. He underwent drainage of pleural fluid and ascites that did not show evidence of gross infection and his fevers initially abated on empiric cefepime that was stopped due to a lack of bacterial process found during evaluation. His fevers and symptoms have recurred off antibiotics and ERCP failed to reveal significant biliary pathology as the cause of his FUO. He remains tenuous and repeat taps have yet to show an etiology of his fevers/FTT.     - liver biopsy negative for acute rejection, stainings pending  - would monitor off antibiotics (no obvious source of infection so far)

## 2018-01-25 NOTE — PROGRESS NOTES
[facility]  Liver Transplant  Progress Note    Patient Name: Jhonny Diana  MRN: [id number]  Admission Date: 2018  Hospital Length of Stay: 14 days  Code Status: Full Code  Primary Care Provider: Primary Doctor No  Post-Operative Day: 98    ORGAN:   LIVER  Disease Etiology: Acute Alcoholic Hepatitis  Donor Type:    - Brain Death  CDC High Risk:   No  Donor CMV Status:   Donor CMV Status: Positive  Donor HBcAB:   Negative  Donor HCV Status:   Negative  Whole or Partial: Whole Liver  Biliary Anastomosis: End to End  Arterial Anatomy: Standard  Subjective:     History of Present Illness:  Jhonny Diana is a 28 y/o male with past medical history of alcoholic cirrhosis.  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 Found to have peritonitis in November (WBC 5000, 75% PNM) neg for bile leak. He was treated initially with vanc/cefepime. Repeat cell counts  with some improvement (WBC 1400, 45% PNM). He has undergone multiple paracenteses as well as abscess drainage of perihepatic fluid collections and treated with antimicrobial therapy but no positive cultures. Of note, biliary stricture also identified and ERCP performed on 2017 with sphincterotomy and biliary stent placed. Liver tests still have not normalized despite intervention, bilirubin and AP remain elevated. Other pertinent PMH current wound vac in place 2/2 wound infection, malnutrition requiring TPN for short course and ongoing hypoalbuminemia, and seizure activity while on prograf and has since been switched to cyclosporine without reoccurrence.  He presented to the ER for fever, abdominal pain, and N/V. He reports fever (103) for 1  day prior. Overnight, he developed N/V, reports small amount of green emesis with new left sided pain. He also endorses worsening SOB with exertion. He was scheduled as an outpatient for follow up paracentesis and IR drainage of fluid collection. CXR in ER shows large pleural effusion with subsegmental atelectasis. Infectious work up initiated in ER. His ANC is 900. Broad spectrum antibiotics initiated in ED. He denies chest pain, palpitations, diarrhea, constipation, or back pain. Denies any sick contacts.    Hospital Course:  Thoracentesis (1.2L off), Paracentesis (1.6L off), and IR drainage of fluid collection 1/11, all fluids negative for infection. ID consulted. Broad spectrum antibiotics d/c'd 1/15. Chronically malnourished with poor PO intake, prealbumin 7. Alex tube placed 1/15 for tube feedings.     ERCP 1/17 with sludge and a biliary stone which was removed and stent exchanged.  Remained with n/v; therefore, TF remained on hold and TPN continued.   Pt with fever s/p ERCP on 1/17 which continued until 1/19.  Vanc/cefepime restarted.  Blood cx 1/17 and 1/18 NGTD.  ID reconsulted.  Fungal markers sent.  CT C/A/P obtained.  With large R pleural effusion and ascites- both drained 1/19 and negative for infx per cell count. Pt afebrile 1/20.     Interval History: No acute events over night.  Liver biopsy without rejection, cultures pending. No fluid for safe paracentesis noted 1/23. No BM since this past weekend and pt continues to have nausea and abdominal fullness. GI consulted - plan for EGD today. Continue TPN.  Encouraged ambulation multiple times daily. Monitor.     Scheduled Meds:   atovaquone  1,500 mg Per NG tube Daily    [START ON 1/26/2018] cefepime  1 g Intravenous Q24H    cycloSPORINE modified  150 mg Oral BID    docusate sodium  100 mg Oral TID PC    epoetin maurisio (PROCRIT) injection  10,000 Units Intravenous Every Tues, Thurs, Sat    fat emulsion 20%  250 mL Intravenous Daily    heparin  (porcine)  5,000 Units Subcutaneous Q12H    levETIRAcetam  500 mg Oral BID    levothyroxine  75 mcg Oral Before breakfast    omeprazole  40 mg Oral BID AC    polyethylene glycol  17 g Oral BID    psyllium husk (aspartame)  3.4 g Oral BID    ursodiol  300 mg Oral BID     Continuous Infusions:   TPN ADULT CENTRAL LINE CUSTOM 40 mL/hr at 01/24/18 2055    TPN ADULT CENTRAL LINE CUSTOM       PRN Meds:sodium chloride, sodium chloride 0.9%, sodium chloride 0.9%, sodium chloride 0.9%, sodium chloride 0.9%, sodium chloride 0.9%, acetaminophen, heparin (porcine), omnipaque, ondansetron, ondansetron, oxyCODONE, prochlorperazine, simethicone, sodium chloride 0.9%, sodium chloride 0.9%    Review of Systems   Constitutional: Positive for activity change, appetite change and fatigue. Negative for chills and fever.   HENT: Negative.  Negative for congestion and facial swelling.    Eyes: Negative for pain, discharge and visual disturbance.   Respiratory: Negative for cough, shortness of breath and wheezing.    Cardiovascular: Positive for leg swelling. Negative for chest pain.   Gastrointestinal: Positive for abdominal distention, abdominal pain and constipation. Negative for nausea and vomiting.   Genitourinary: Positive for decreased urine volume. Negative for difficulty urinating.        Anuric   Musculoskeletal: Negative for arthralgias, back pain and myalgias.   Skin: Positive for wound. Negative for color change and rash.   Allergic/Immunologic: Positive for immunocompromised state.   Neurological: Positive for weakness. Negative for dizziness, seizures and headaches.   Psychiatric/Behavioral: Positive for decreased concentration and dysphoric mood. Negative for confusion. The patient is not nervous/anxious.    All other systems reviewed and are negative.    Objective:     Vital Signs (Most Recent):  Temp: 99.2 °F (37.3 °C) (01/25/18 1255)  Pulse: 88 (01/25/18 1255)  Resp: 16 (01/25/18 1255)  BP: 118/72 (01/25/18  1255)  SpO2: 98 % (01/25/18 1255) Vital Signs (24h Range):  Temp:  [98.4 °F (36.9 °C)-99.4 °F (37.4 °C)] 99.2 °F (37.3 °C)  Pulse:  [68-89] 88  Resp:  [16-18] 16  SpO2:  [93 %-99 %] 98 %  BP: (114-129)/(68-79) 118/72     Weight: 70.1 kg (154 lb 8.7 oz)  Body mass index is 24.94 kg/m².    Intake/Output - Last 3 Shifts       01/23 0700 - 01/24 0659 01/24 0700 - 01/25 0659 01/25 0700 - 01/26 0659    P.O. 360 500     Other 700      IV Piggyback  50      824     Total Intake(mL/kg) 1310 (18.7) 1374 (19.6)     Urine (mL/kg/hr) 0 (0)      Emesis/NG output 0 (0)      Other 3700 (2.2)      Stool       Blood       Total Output 3700        Net -2390 +1374             Urine Occurrence  0 x     Stool Occurrence  0 x           Physical Exam   Constitutional: He is oriented to person, place, and time. He appears well-developed. No distress.   Temporal and distal extremity muscle wasting   HENT:   Head: Normocephalic and atraumatic.   Mouth/Throat: No oropharyngeal exudate.   Eyes: EOM are normal. Pupils are equal, round, and reactive to light. Scleral icterus is present.   Neck: Normal range of motion. Neck supple. No JVD present. No thyromegaly present.   Cardiovascular: Normal rate, regular rhythm, normal heart sounds and intact distal pulses.    No murmur heard.  Pulmonary/Chest: Effort normal. No respiratory distress. He has decreased breath sounds in the right middle field, the right lower field and the left lower field. He has no wheezes. He exhibits no tenderness.   Diminished to RLL   Abdominal: Soft. Bowel sounds are normal. He exhibits ascites. He exhibits no distension. There is tenderness (mild). There is no rebound and no guarding.   Dressing to chevron.  Wd vac removed 1/12/18  Dependent edema present R flank; surrounding erythema   Musculoskeletal: Normal range of motion. He exhibits edema (2+ LE edema). He exhibits no tenderness.   Neurological: He is alert and oriented to person, place, and time. He has  normal reflexes.   Skin: Skin is warm and dry. Capillary refill takes 2 to 3 seconds. He is not diaphoretic. No erythema.   jaundice   Psychiatric: He has a normal mood and affect. His behavior is normal. Judgment and thought content normal. His mood appears not anxious.   Depressed mood   Nursing note and vitals reviewed.      Laboratory:  Immunosuppressants         Stop Route Frequency     cycloSPORINE modified capsule 150 mg      -- Oral 2 times daily        CBC:     Recent Labs  Lab 01/25/18  0500   WBC 4.76   RBC 2.48*   HGB 7.8*   HCT 23.0*      MCV 93   MCH 31.5*   MCHC 33.9     CMP:     Recent Labs  Lab 01/25/18  0500   GLU 96   CALCIUM 9.1   ALBUMIN 2.0*   PROT 5.6*   *   K 4.1   CO2 24   CL 98   BUN 25*   CREATININE 4.1*   ALKPHOS 168*   ALT 5*   AST 25   BILITOT 2.8*     Labs within the past 24 hours have been reviewed.    Diagnostic Results:  I have personally reviewed all pertinent imaging studies.    Assessment/Plan:     * Fever    - unclear source of fever, previously thought to be related to peritonitis  - blood cultures prelim NGTD. No urine cx obtained as pt anuric.   - Pt underwent thoracentesis, paracentesis, and drain placement into fluid collection 1/11/18.   - All fluids reviewed and negative for infection. Cultures NGTD.  - ID was following have since signed off. Cefepime d/c 1/15.   - Remains febrile since 1/17. Repeat blood cx NGTD. Resumed Vanc/Cefepime given fever 1/17.  - ID reconsulted --> fungal markers pending (crypto Ag, Aspergillus Ag, Fungitell Assay, Histo Ag)  - CT C/A/P with large R pleural effusion and ascites --> both drained 1/19 and negative for infection  - no fluid for para on 1/23.  - liver bx 1/23 cultures pending.        Liver transplanted    - Post op course complicated by fevers and hyperbilirubinemia.  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  - AST/ALT normal. Tbili/alkphos remain elevated.  - Liver US 1/8 showed 3.9 cm complex  fluid collection anterior to right lobe and moderate nonspecific complex ascites inferior to transplant.   - IR placed drain 1/11/18, cell count negative for infection.  - PBS consulted. PBS not comfortable proceeding with EUS with liver biopsy given fever and possible infectious process going on. ERCP 1/17 with stone and sludge. Pt placed on Actigall.  - Bili remains elevated.  - Liver biopsy 1/23 without rejection.        Acute renal failure with tubular necrosis    - HD resumed on previous admission. Now anuric and dialyzes M-W-F.  - Nephrology following.    - HD Sat since held Friday for thora/para.  - HD transitioned to TTS schedule.  - ktm consulted for possible kidney transplant        Delayed surgical wound healing    - wd vac removed 1/12/18.  Wound healing well. Aquacel AG and Mepilex border dressing applied via wound care recs.           Long-term use of immunosuppressant medication    - Maintenance IS with cyclosporine. MMF on hold for infections and neutropenia. Continue to check cyclosporine level daily. Assess for toxicity and adjust level as needed.        Prophylactic immunotherapy    - See long term use of immunosuppression.         Severe protein-calorie malnutrition    - Poor PO intake since transplant requiring short course of TPN during previous hospital stay.   - albumin remains decreased but appetite slowly improving per pt.   - Dietary consulted. Will need to closely monitor PO intake.   - supplements also ordered.   - SHANE with tube feeding start 1/16. Stopped 1/17/18, was not tolerating. Shane removed 1/20.  - prealbumin 7 on 1/15.  - TPN started 1/16/18.   - Pt able to eat minimally 1/21 and 1/22 which is an improvement.  - gi consulted - EGD pending, start ppi bid, miralax bid, and metamucil bid, decrease narcotics        Constipation    - Enema ordered 1/20 and 1/21 with BM  - Encourage ambulation.  - decrease narcotics            At risk for opportunistic infections    - Hold Valcyte as  WBC low.  CMV PCR 1/11, 1/18 undetected.  - bactrim held.          Anemia of chronic disease    - PRBC x 1 1/19  - PRBC x 1 1/21  - Continue daily CBC.  - H&H trending down but stable.  - consider blood transfusion with next HD.        Other ascites    - Paracentesis performed 1/11/18 with 1600 ml removed.    - fluid negative for infection.   - Repeat para 1/19 negative for infection per cell count  - no fluid seen for paracentesis on 1/23.        Neutropenia    - Infectious work up negative so far, dc antibiotics 1/15.  - CMV PCR from 1/11, 1/18 not detected. Monitor.  - Neupogen given 1/16/18 with improvement in ANC seen.         Nausea and vomiting    - h/o constipation, reports having regular BM's with bowel regimen  - vomiting episode x 1 at home. Anti-emetics ordered PRN.  - KUB repeated 1/14/18 given increased abdominal pain- mild, generalized bowel distention suggesting ileus.  Diet changed to clears as tolerated for bowel rest   - SHANE tube placed 1/16 for tube feeds. Not tolerating tube feeds, stopped 1/17/18.  - D/C shane 1/19  - chronic constipation, Cont bowel regimen.   - GI consulted.          Pleural effusion, right    - CXR in ER with large right pleural effusion with subjective c/o worsening SOB  - thoracentesis completed- 1200 ml removed.  SOB with significant improvement.   - Cell count reviewed and negative for infection.   - resp even and non labored.  O2 sat 94-98%.  - Repeat thora 1/19 negative for infection per cell count  - Reaccumulating fluid on xray 1/21.            VTE Risk Mitigation         Ordered     heparin (porcine) injection 1,000 Units  As needed (PRN)     Route:  Intra-Catheter        01/12/18 1017     heparin (porcine) injection 5,000 Units  Every 12 hours     Route:  Subcutaneous        01/12/18 0926     Medium Risk of VTE  Once      01/11/18 0351     Place sequential compression device  Until discontinued      01/11/18 0351          The patients clinical status was discussed  at multidisplinary rounds, involving transplant surgery, transplant medicine, pharmacy, nursing, nutrition, and social work    Discharge Planning:  Not candidate at this time  Monitor kidney function --> may need outpt HD  Monitor HH needs vs rehab for deconditioned status      Seda Camacho, NP  Liver Transplant  [facility]

## 2018-01-25 NOTE — CONSULTS
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    Post transplant patient has been dealing with intermittent nausea with no emesis as well as poor PO intake.   However during this last admission patient feels that his nausea has worsen and is now associated with emesis. He also complains of constipation.  He states that he can have an episode of nausea and emesis whenever but it seems that meals and fever worsen the nausea and emesis.   In regards to the constipation he expresses having difficulty moving his bowels but also does not like Miralax and actually wants to be off the medication for 1 week.  He does describe some abdominal discomfort but he is unsure if its secondary to procedural pain, ascites, or gas.   Of note no recent EGD. His electrolyte are being monitored but he is on PO narcotics every 4 hours which he feels that he cannot stop.    Prior imaging and GI procedures:  CT chest abdomen and pelvis 1/18/18:  Multifocal patchy areas of consolidation in the left lung with a more confluent consolidation or atelectasis in the right lung.   Large right and small left pleural effusions which create local compressive atelectasis.  Simple collection about the inferior margin of the liver similar to the prior ultrasound.  Other incidental findings including biliary stent placement,  pneumobilia, cardiomegaly, right and left sided central venous catheters, and body wall anasarca.    ERCP 1/17/18:  The anastomotic stricture has almost resolved.  Choledocholithiasis was found. Complete removal was accomplished by balloon extraction.  One plastic stent was placed across the anastomosis as requested by the transplant service.                       Past Medical History:   Diagnosis Date    Alcoholic hepatitis with ascites     Alcoholic hepatitis with ascites     History of hematemesis 9/28/2017    Hypertension     Renal disorder        Past Surgical History:   Procedure Laterality Date    APPENDECTOMY      ERCP      ESOPHAGOGASTRODUODENOSCOPY      LIVER TRANSPLANT         Review of patient's allergies indicates:   Allergen Reactions    Bactrim [sulfamethoxazole-trimethoprim] Other (See Comments)     Mookie Trell Syndrome     Family History     None        Social History Main Topics    Smoking status: Former Smoker     Packs/day: 1.00     Years: 10.00     Types: Cigarettes    Smokeless tobacco: Never Used    Alcohol use No      Comment: a fifth of liquor daily for years, cut back over last 2 months    Drug use: No    Sexual activity: Not on file     Review of Systems   Constitutional: Positive for activity change, appetite change and fatigue. Negative for fever.   HENT: Negative for trouble swallowing.    Respiratory: Negative.    Cardiovascular: Negative.    Gastrointestinal: Positive for abdominal distention, abdominal pain, constipation, nausea and vomiting. Negative for anal bleeding, blood in stool, diarrhea and rectal pain.   Neurological: Negative for dizziness, syncope, weakness and headaches.     Objective:     Vital Signs (Most Recent):  Temp: 98.9 °F (37.2 °C) (01/24/18 1542)  Pulse: 80 (01/24/18 1542)  Resp: 17 (01/24/18 1542)  BP: 114/74 (01/24/18 1542)  SpO2: 98 % (01/24/18 1542) Vital Signs (24h Range):  Temp:  [98.6 °F (37 °C)-100.3 °F (37.9 °C)] 98.9 °F (37.2  °C)  Pulse:  [80-89] 80  Resp:  [16-18] 17  SpO2:  [96 %-98 %] 98 %  BP: (113-130)/(67-77) 114/74     Weight: 70.1 kg (154 lb 8.7 oz) (01/23/18 0600)  Body mass index is 24.94 kg/m².      Intake/Output Summary (Last 24 hours) at 01/24/18 1826  Last data filed at 01/24/18 1800   Gross per 24 hour   Intake             1804 ml   Output                0 ml   Net             1804 ml       Lines/Drains/Airways     Central Venous Catheter Line                 Hemodialysis Catheter 10/30/17 1140 right internal jugular 86 days         Percutaneous Central Line Insertion/Assessment - triple lumen  01/16/18 1800 left internal jugular 8 days                Physical Exam   Constitutional: He is oriented to person, place, and time. No distress.   HENT:   Head: Normocephalic and atraumatic.   Eyes: No scleral icterus.   Neck: Normal range of motion.   Cardiovascular: Normal rate and regular rhythm.    Pulmonary/Chest: Effort normal and breath sounds normal.   Abdominal: Soft. Bowel sounds are normal. He exhibits distension. He exhibits no mass. There is no tenderness. There is no rebound and no guarding. No hernia.   Musculoskeletal: Normal range of motion. He exhibits edema.   Neurological: He is alert and oriented to person, place, and time.   Skin: He is not diaphoretic.       Significant Labs:  CBC:   Recent Labs  Lab 01/23/18  0400 01/24/18  0500 01/24/18  0742   WBC 4.84 4.11  --    HGB 7.9* 7.5* 7.5*   HCT 22.7* 22.4* 22.9*    166  --      CMP:   Recent Labs  Lab 01/24/18  0500   GLU 85   CALCIUM 8.8   ALBUMIN 2.0*   PROT 5.4*   *   K 4.1   CO2 25      BUN 18   CREATININE 3.1*   ALKPHOS 175*   ALT 7*   AST 27   BILITOT 2.9*     Coagulation: No results for input(s): PT, INR, APTT in the last 48 hours.    Significant Imaging:  Imaging results within the past 24 hours have been reviewed.    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 Patient's history is complicated as his post transplant course was complicated with seizures, ATN, and wound infection. After speaking at length with both his mom and patient we think his current symptoms are likely multifactorial from a combination of narcotic bowel (on pain medications which are probably decreasing motility) and a component of gastroparesis. Although we want to perform an EGD to rule out any gastritis/PUD we are also recommending various agents to help with his current symptoms (i.e. Nausea, emesis, and constipation).     Recommendations:  NPO for EGD tomorrow, after EGD we recommend an UGI small bowel follow through as well as a gastric emptying study   After EGD patient to follow a gastroparesis diet-small frequent low fat meals as well as supplemental shakes like Breeze  Closely monitor daily electrolytes (K, Mag, phos) and replace as needed  Increase PPI to BID  Increase Miralax to BID and stop Colace  Start Metamucil BID  Try to minimize/wean narcotic use and start Cymbalta 30 mg PO Qdaily for 2 weeks and can increase to 60 afterwards if needed (please check with pharmacy to make sure it doesn't interact with post transplant meds, unlikely to reduce seizure threshold)  Touch base with AES to make sure rising bili isn't due to stones, etc we feel that its high unlikely/that AES needs to do anything and that his current elevation is more of a chronic issue  Consider psychology consult            Thank you for your consult. I will follow-up with patient. Please contact us if you have any additional questions.    Divya Booker M.D.  Gastroenterology Fellow, PGY-IV  Pager: [phone]  [facility]

## 2018-01-25 NOTE — ASSESSMENT & PLAN NOTE
- HD resumed on previous admission. Now anuric and dialyzes M-W-F.  - Nephrology following.    - HD Sat since held Friday for thora/para.  - HD transitioned to TTS schedule.  - ktm consulted for possible kidney transplant

## 2018-01-25 NOTE — ASSESSMENT & PLAN NOTE
- Poor PO intake since transplant requiring short course of TPN during previous hospital stay.   - albumin remains decreased but appetite slowly improving per pt.   - Dietary consulted. Will need to closely monitor PO intake.   - supplements also ordered.   - SHANE with tube feeding start 1/16. Stopped 1/17/18, was not tolerating. Shane removed 1/20.  - prealbumin 7 on 1/15.  - TPN started 1/16/18.   - Pt able to eat minimally 1/21 and 1/22 which is an improvement.  - gi consulted - EGD pending, start ppi bid, miralax bid, and metamucil bid, decrease narcotics

## 2018-01-25 NOTE — SUBJECTIVE & OBJECTIVE
Past Medical History:   Diagnosis Date    Alcoholic hepatitis with ascites     Alcoholic hepatitis with ascites     History of hematemesis 9/28/2017    Hypertension     Renal disorder        Past Surgical History:   Procedure Laterality Date    APPENDECTOMY      ERCP      ESOPHAGOGASTRODUODENOSCOPY      LIVER TRANSPLANT         Review of patient's allergies indicates:   Allergen Reactions    Bactrim [sulfamethoxazole-trimethoprim] Other (See Comments)     Mookie Trell Syndrome     Family History     None        Social History Main Topics    Smoking status: Former Smoker     Packs/day: 1.00     Years: 10.00     Types: Cigarettes    Smokeless tobacco: Never Used    Alcohol use No      Comment: a fifth of liquor daily for years, cut back over last 2 months    Drug use: No    Sexual activity: Not on file     Review of Systems   Constitutional: Positive for activity change, appetite change and fatigue. Negative for fever.   HENT: Negative for trouble swallowing.    Respiratory: Negative.    Cardiovascular: Negative.    Gastrointestinal: Positive for abdominal distention, abdominal pain, constipation, nausea and vomiting. Negative for anal bleeding, blood in stool, diarrhea and rectal pain.   Neurological: Negative for dizziness, syncope, weakness and headaches.     Objective:     Vital Signs (Most Recent):  Temp: 98.9 °F (37.2 °C) (01/24/18 1542)  Pulse: 80 (01/24/18 1542)  Resp: 17 (01/24/18 1542)  BP: 114/74 (01/24/18 1542)  SpO2: 98 % (01/24/18 1542) Vital Signs (24h Range):  Temp:  [98.6 °F (37 °C)-100.3 °F (37.9 °C)] 98.9 °F (37.2 °C)  Pulse:  [80-89] 80  Resp:  [16-18] 17  SpO2:  [96 %-98 %] 98 %  BP: (113-130)/(67-77) 114/74     Weight: 70.1 kg (154 lb 8.7 oz) (01/23/18 0600)  Body mass index is 24.94 kg/m².      Intake/Output Summary (Last 24 hours) at 01/24/18 1826  Last data filed at 01/24/18 1800   Gross per 24 hour   Intake             1804 ml   Output                0 ml   Net              1804 ml       Lines/Drains/Airways     Central Venous Catheter Line                 Hemodialysis Catheter 10/30/17 1140 right internal jugular 86 days         Percutaneous Central Line Insertion/Assessment - triple lumen  01/16/18 1800 left internal jugular 8 days                Physical Exam   Constitutional: He is oriented to person, place, and time. No distress.   HENT:   Head: Normocephalic and atraumatic.   Eyes: No scleral icterus.   Neck: Normal range of motion.   Cardiovascular: Normal rate and regular rhythm.    Pulmonary/Chest: Effort normal and breath sounds normal.   Abdominal: Soft. Bowel sounds are normal. He exhibits distension. He exhibits no mass. There is no tenderness. There is no rebound and no guarding. No hernia.   Musculoskeletal: Normal range of motion. He exhibits edema.   Neurological: He is alert and oriented to person, place, and time.   Skin: He is not diaphoretic.       Significant Labs:  CBC:   Recent Labs  Lab 01/23/18  0400 01/24/18  0500 01/24/18  0742   WBC 4.84 4.11  --    HGB 7.9* 7.5* 7.5*   HCT 22.7* 22.4* 22.9*    166  --      CMP:   Recent Labs  Lab 01/24/18  0500   GLU 85   CALCIUM 8.8   ALBUMIN 2.0*   PROT 5.4*   *   K 4.1   CO2 25      BUN 18   CREATININE 3.1*   ALKPHOS 175*   ALT 7*   AST 27   BILITOT 2.9*     Coagulation: No results for input(s): PT, INR, APTT in the last 48 hours.    Significant Imaging:  Imaging results within the past 24 hours have been reviewed.

## 2018-01-25 NOTE — ASSESSMENT & PLAN NOTE
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 Patient's history is complicated as his post transplant course was complicated with seizures, ATN, and wound infection. After speaking at length with both his mom and patient we think his current symptoms are likely multifactorial from a combination of narcotic bowel (on pain medications which are probably decreasing motility) and a component of gastroparesis. Although we want to perform an EGD to rule out any gastritis/PUD we are also recommending various agents to help with his current symptoms (i.e. Nausea, emesis, and constipation).     Recommendations:  NPO for EGD tomorrow, after EGD we recommend an UGI small bowel follow through as well as a gastric emptying study   After EGD patient to follow a gastroparesis diet-small frequent low fat meals as well as supplemental shakes like Breeze  Closely monitor daily electrolytes (K, Mag, phos) and replace as needed  Increase PPI to BID  Increase Miralax to BID and stop Colace  Start Metamucil BID  Try to minimize/wean narcotic use and start Cymbalta 30 mg PO Qdaily for 2 weeks and can increase to 60 afterwards if needed (please check with pharmacy to make sure it doesn't interact with post transplant meds, unlikely to reduce seizure threshold)  Touch base with [facility] to make sure rising bili isn't due to stones, etc we feel that its high unlikely/that [facility] needs to do anything and that his current elevation is more of a chronic issue  Consider psychology consult

## 2018-01-25 NOTE — ANESTHESIA PREPROCEDURE EVALUATION
01/25/2018  Jhonny Diana is a 27 y.o., male.    Anesthesia Evaluation    I have reviewed the Patient Summary Reports.     I have reviewed the Medications.     Review of Systems  Anesthesia Hx:  No problems with previous Anesthesia  History of prior surgery of interest to airway management or planning: Previous anesthesia: General   Social:  Former Smoker, Social Alcohol Use    Hematology/Oncology:  Hematology Normal   Oncology Normal     EENT/Dental:EENT/Dental Normal   Cardiovascular:   Exercise tolerance: good Hypertension, well controlled    Pulmonary:  Pulmonary Normal    Renal/:   Chronic Renal Disease    Hepatic/GI:   Liver Disease, Hepatitis    Musculoskeletal:  Musculoskeletal Normal    Neurological:   Seizures, well controlled    Endocrine:   Hypothyroidism    Dermatological:  Skin Normal    Psych:   Psychiatric History          Physical Exam  General:  Well nourished    Airway/Jaw/Neck:  Airway Findings: Mouth Opening: Normal Tongue: Normal  General Airway Assessment: Adult  Mallampati: II  TM Distance: Normal, at least 6 cm  Jaw/Neck Findings:  Neck ROM: Normal ROM      Dental:  Dental Findings: In tact        Mental Status:  Mental Status Findings:  Cooperative, Alert and Oriented         Anesthesia Plan  Type of Anesthesia, risks & benefits discussed:  Anesthesia Type:  general  Patient's Preference: GA  Intra-op Monitoring Plan: standard ASA monitors  Intra-op Monitoring Plan Comments:   Post Op Pain Control Plan:   Post Op Pain Control Plan Comments:   Induction:   IV  Beta Blocker:  Patient is not currently on a Beta-Blocker (No further documentation required).       Informed Consent: Patient understands risks and agrees with Anesthesia plan.  Questions answered. Anesthesia consent signed with patient.  ASA Score: 3     Day of Surgery Review of History & Physical:  There are no significant  changes.  H&P update referred to the provider.         Ready For Surgery From Anesthesia Perspective.

## 2018-01-25 NOTE — SUBJECTIVE & OBJECTIVE
Scheduled Meds:   atovaquone  1,500 mg Per NG tube Daily    [START ON 1/26/2018] cefepime  1 g Intravenous Q24H    cycloSPORINE modified  150 mg Oral BID    docusate sodium  100 mg Oral TID PC    epoetin maurisio (PROCRIT) injection  10,000 Units Intravenous Every Tues, Thurs, Sat    fat emulsion 20%  250 mL Intravenous Daily    heparin (porcine)  5,000 Units Subcutaneous Q12H    levETIRAcetam  500 mg Oral BID    levothyroxine  75 mcg Oral Before breakfast    omeprazole  40 mg Oral BID AC    polyethylene glycol  17 g Oral BID    psyllium husk (aspartame)  3.4 g Oral BID    ursodiol  300 mg Oral BID     Continuous Infusions:   TPN ADULT CENTRAL LINE CUSTOM 40 mL/hr at 01/24/18 2055    TPN ADULT CENTRAL LINE CUSTOM       PRN Meds:sodium chloride, sodium chloride 0.9%, sodium chloride 0.9%, sodium chloride 0.9%, sodium chloride 0.9%, sodium chloride 0.9%, acetaminophen, heparin (porcine), omnipaque, ondansetron, ondansetron, oxyCODONE, prochlorperazine, simethicone, sodium chloride 0.9%, sodium chloride 0.9%    Review of Systems   Constitutional: Positive for activity change, appetite change and fatigue. Negative for chills and fever.   HENT: Negative.  Negative for congestion and facial swelling.    Eyes: Negative for pain, discharge and visual disturbance.   Respiratory: Negative for cough, shortness of breath and wheezing.    Cardiovascular: Positive for leg swelling. Negative for chest pain.   Gastrointestinal: Positive for abdominal distention, abdominal pain and constipation. Negative for nausea and vomiting.   Genitourinary: Positive for decreased urine volume. Negative for difficulty urinating.        Anuric   Musculoskeletal: Negative for arthralgias, back pain and myalgias.   Skin: Positive for wound. Negative for color change and rash.   Allergic/Immunologic: Positive for immunocompromised state.   Neurological: Positive for weakness. Negative for dizziness, seizures and headaches.    Psychiatric/Behavioral: Positive for decreased concentration and dysphoric mood. Negative for confusion. The patient is not nervous/anxious.    All other systems reviewed and are negative.    Objective:     Vital Signs (Most Recent):  Temp: 99.2 °F (37.3 °C) (01/25/18 1255)  Pulse: 88 (01/25/18 1255)  Resp: 16 (01/25/18 1255)  BP: 118/72 (01/25/18 1255)  SpO2: 98 % (01/25/18 1255) Vital Signs (24h Range):  Temp:  [98.4 °F (36.9 °C)-99.4 °F (37.4 °C)] 99.2 °F (37.3 °C)  Pulse:  [68-89] 88  Resp:  [16-18] 16  SpO2:  [93 %-99 %] 98 %  BP: (114-129)/(68-79) 118/72     Weight: 70.1 kg (154 lb 8.7 oz)  Body mass index is 24.94 kg/m².    Intake/Output - Last 3 Shifts       01/23 0700 - 01/24 0659 01/24 0700 - 01/25 0659 01/25 0700 - 01/26 0659    P.O. 360 500     Other 700      IV Piggyback  50      824     Total Intake(mL/kg) 1310 (18.7) 1374 (19.6)     Urine (mL/kg/hr) 0 (0)      Emesis/NG output 0 (0)      Other 3700 (2.2)      Stool       Blood       Total Output 3700        Net -2390 +1374             Urine Occurrence  0 x     Stool Occurrence  0 x           Physical Exam   Constitutional: He is oriented to person, place, and time. He appears well-developed. No distress.   Temporal and distal extremity muscle wasting   HENT:   Head: Normocephalic and atraumatic.   Mouth/Throat: No oropharyngeal exudate.   Eyes: EOM are normal. Pupils are equal, round, and reactive to light. Scleral icterus is present.   Neck: Normal range of motion. Neck supple. No JVD present. No thyromegaly present.   Cardiovascular: Normal rate, regular rhythm, normal heart sounds and intact distal pulses.    No murmur heard.  Pulmonary/Chest: Effort normal. No respiratory distress. He has decreased breath sounds in the right middle field, the right lower field and the left lower field. He has no wheezes. He exhibits no tenderness.   Diminished to RLL   Abdominal: Soft. Bowel sounds are normal. He exhibits ascites. He exhibits no  distension. There is tenderness (mild). There is no rebound and no guarding.   Dressing to chevron.  Wd vac removed 1/12/18  Dependent edema present R flank; surrounding erythema   Musculoskeletal: Normal range of motion. He exhibits edema (2+ LE edema). He exhibits no tenderness.   Neurological: He is alert and oriented to person, place, and time. He has normal reflexes.   Skin: Skin is warm and dry. Capillary refill takes 2 to 3 seconds. He is not diaphoretic. No erythema.   jaundice   Psychiatric: He has a normal mood and affect. His behavior is normal. Judgment and thought content normal. His mood appears not anxious.   Depressed mood   Nursing note and vitals reviewed.      Laboratory:  Immunosuppressants         Stop Route Frequency     cycloSPORINE modified capsule 150 mg      -- Oral 2 times daily        CBC:     Recent Labs  Lab 01/25/18  0500   WBC 4.76   RBC 2.48*   HGB 7.8*   HCT 23.0*      MCV 93   MCH 31.5*   MCHC 33.9     CMP:     Recent Labs  Lab 01/25/18  0500   GLU 96   CALCIUM 9.1   ALBUMIN 2.0*   PROT 5.6*   *   K 4.1   CO2 24   CL 98   BUN 25*   CREATININE 4.1*   ALKPHOS 168*   ALT 5*   AST 25   BILITOT 2.8*     Labs within the past 24 hours have been reviewed.    Diagnostic Results:  I have personally reviewed all pertinent imaging studies.

## 2018-01-25 NOTE — TRANSFER OF CARE
"Anesthesia Transfer of Care Note    Patient: Johnny Diana    Procedure(s) Performed: Procedure(s) (LRB):  ESOPHAGOGASTRODUODENOSCOPY (EGD) (N/A)    Patient location: [facility]    Anesthesia Type: general    Transport from OR: Transported from OR on room air with adequate spontaneous ventilation    Post pain: adequate analgesia    Post assessment: no apparent anesthetic complications and tolerated procedure well    Post vital signs: stable    Level of consciousness: awake    Nausea/Vomiting: no nausea/vomiting    Complications: none    Transfer of care protocol was followed      Last vitals:   Visit Vitals  /73   Pulse 91   Temp 37.2 °C (99 °F) (Skin)   Resp 16   Ht 5' 6" (1.676 m)   Wt 70.1 kg (154 lb 8.7 oz)   SpO2 95%   BMI 24.94 kg/m²     "

## 2018-01-25 NOTE — PLAN OF CARE
Problem: Patient Care Overview  Goal: Plan of Care Review  Outcome: Ongoing (interventions implemented as appropriate)  Pt is AAOx4 in bed wearing non-skid footwear, bed in low/locked position and with call bell within reach. Pt reminded to use call bell to call for assistance, pt verbalizes understanding. Mother at bedside. Pt is afebrile at this time. Tmax 99.3 so far this shift. Proper hand hygiene performed before and after pt care activities. NPO since midnight for EGD. TPN and lipids infusing as ordered. Patient c/o back and abdominal pain, PRN Oxycodone administered this shift.

## 2018-01-25 NOTE — ASSESSMENT & PLAN NOTE
- unclear source of fever, previously thought to be related to peritonitis  - blood cultures prelim NGTD. No urine cx obtained as pt anuric.   - Pt underwent thoracentesis, paracentesis, and drain placement into fluid collection 1/11/18.   - All fluids reviewed and negative for infection. Cultures NGTD.  - ID was following have since signed off. Cefepime d/c 1/15.   - Remains febrile since 1/17. Repeat blood cx NGTD. Resumed Vanc/Cefepime given fever 1/17.  - ID reconsulted --> fungal markers pending (crypto Ag, Aspergillus Ag, Fungitell Assay, Histo Ag)  - CT C/A/P with large R pleural effusion and ascites --> both drained 1/19 and negative for infection  - no fluid for para on 1/23.  - liver bx 1/23 cultures pending.

## 2018-01-25 NOTE — PROGRESS NOTES
[facility]  Infectious Disease  Progress Note    Patient Name: Jhonny Diana  MRN: [id number]  Admission Date: 1/11/2018  Length of Stay: 14 days  Attending Physician: Nathanael Medina MD  Primary Care Provider: Primary Doctor No    Isolation Status: No active isolations  Assessment/Plan:      * Fever    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, CMV D+/R+, steroid induction, on maintenance tacro/MMF/pred; c/b seizures, LAURA, superificial wound infection s/p wound vac to [facility] through 1/12, and several recent admissions on 11/24 and 12/3 with culture negative peritonitis with peak WBC ~5k due to suspected indolent biliary leakage s/p sphincterotomy/biliary stent placement over CBD stricture on 12/6 ERCP although notably with no overt leak noted during procedure) who was admitted on 1/11/2017 with acute onset fevers and acute on chronic N/V/abdominal pain (RLQ worst) in the setting of persistent noninflammatory ascites of unknown etiology. He underwent drainage of pleural fluid and ascites that did not show evidence of gross infection and his fevers initially abated on empiric cefepime that was stopped due to a lack of bacterial process found during evaluation. His fevers and symptoms have recurred off antibiotics and ERCP failed to reveal significant biliary pathology as the cause of his FUO. He remains tenuous and repeat taps have yet to show an etiology of his fevers/FTT.     - liver biopsy negative for acute rejection, stainings pending  - would monitor off antibiotics (no obvious source of infection so far)              Anticipated Disposition: monitor off antibiotics    Thank you for your consult. I will sign off. Please contact us if you have any additional questions.    Rk Che MD  Infectious Disease Fellow, PGY-5  Spectra: [phone]  Pager: [phone]  [facility]    Subjective:     Principal  Problem:Fever    HPI: No notes on file  Interval History: afebrile, mild soreness over abdomen, otherwise feeling well    Review of Systems   Constitutional: Negative for chills and fever.   HENT: Negative for sore throat.    Respiratory: Negative for cough, chest tightness and shortness of breath.    Cardiovascular: Negative for chest pain, palpitations and leg swelling.   Gastrointestinal: Negative for abdominal distention, abdominal pain, diarrhea, nausea and vomiting.   Genitourinary: Negative for dysuria, flank pain and urgency.   Skin: Negative for rash.   Neurological: Negative for dizziness, light-headedness and numbness.   Psychiatric/Behavioral: Negative for confusion.     Objective:     Vital Signs (Most Recent):  Temp: 99.4 °F (37.4 °C) (01/25/18 0748)  Pulse: 81 (01/25/18 1045)  Resp: 18 (01/25/18 0730)  BP: 122/73 (01/25/18 1045)  SpO2: 95 % (01/25/18 0730) Vital Signs (24h Range):  Temp:  [98.6 °F (37 °C)-99.4 °F (37.4 °C)] 99.4 °F (37.4 °C)  Pulse:  [68-86] 81  Resp:  [16-18] 18  SpO2:  [93 %-99 %] 95 %  BP: (113-129)/(67-79) 122/73     Weight: 70.1 kg (154 lb 8.7 oz)  Body mass index is 24.94 kg/m².    Estimated Creatinine Clearance: 24.2 mL/min (based on SCr of 4.1 mg/dL (H)).    Physical Exam   Constitutional: He is oriented to person, place, and time. No distress.   HENT:   Mouth/Throat: No oropharyngeal exudate.   Eyes: No scleral icterus.   Cardiovascular: Normal rate and regular rhythm.    Pulmonary/Chest: Effort normal and breath sounds normal. No respiratory distress. He has no wheezes. He has no rales.   Abdominal: Soft. There is no tenderness. There is no rebound.   Surgical wound on right side still slighlty open (clean borders, no erythema, no secretions), left side of surgical wound healing appropriately    Musculoskeletal: He exhibits no edema.   Lymphadenopathy:     He has no cervical adenopathy.   Neurological: He is alert and oriented to person, place, and time. No cranial nerve  deficit.   Skin: He is not diaphoretic.       Significant Labs:   Bilirubin:   Recent Labs  Lab 12/27/17  0755 01/02/18  0805 01/05/18  0805  01/12/18  0627  01/21/18  0500 01/22/18  0430 01/23/18  0400 01/24/18  0500 01/25/18  0500   BILIDIR 1.8* 2.3* 2.2*  --  2.0*  --   --   --   --   --   --    BILITOT 2.2* 3.0* 3.2*  < > 2.7*  < > 2.6* 2.9* 2.8* 2.9* 2.8*   < > = values in this interval not displayed.  Blood Culture:     Recent Labs  Lab 01/11/18  0205 01/17/18  1650 01/17/18  1651 01/18/18  1922 01/18/18  1927   LABBLOO No growth after 5 days. No growth after 5 days. No growth after 5 days. No growth after 5 days. No growth after 5 days.     BMP:     Recent Labs  Lab 01/25/18  0500   GLU 96   *   K 4.1   CL 98   CO2 24   BUN 25*   CREATININE 4.1*   CALCIUM 9.1   MG 2.0     CBC:     Recent Labs  Lab 01/24/18  0500 01/24/18  0742 01/25/18  0500   WBC 4.11  --  4.76   HGB 7.5* 7.5* 7.8*   HCT 22.4* 22.9* 23.0*     --  185     CMP:     Recent Labs  Lab 01/24/18  0500 01/25/18  0500   * 133*   K 4.1 4.1    98   CO2 25 24   GLU 85 96   BUN 18 25*   CREATININE 3.1* 4.1*   CALCIUM 8.8 9.1   PROT 5.4* 5.6*   ALBUMIN 2.0* 2.0*   BILITOT 2.9* 2.8*   ALKPHOS 175* 168*   AST 27 25   ALT 7* 5*   ANIONGAP 10 11   EGFRNONAA 26.0* 18.5*     Microbiology Results (last 7 days)     Procedure Component Value Units Date/Time    Fungus culture [[id number]] Collected:  01/19/18 1101    Order Status:  Completed Specimen:  Abdominal from Ascites Updated:  01/25/18 1119     Fungus (Mycology) Culture Culture in progress    Fungus culture [[id number]] Collected:  01/19/18 1140    Order Status:  Completed Specimen:  Pleural Fluid from Pleural Fluid Updated:  01/25/18 1119     Fungus (Mycology) Culture Culture in progress    Culture, Anaerobic [[id number]] Collected:  01/19/18 1140    Order Status:  Completed Specimen:  Pleural Fluid from Pleural Fluid Updated:  01/24/18 0909     Anaerobic Culture Culture in progress     Blood culture [[id number]] Collected:  01/18/18 1927    Order Status:  Completed Specimen:  Blood Updated:  01/23/18 2212     Blood Culture, Routine No growth after 5 days.    Blood culture [[id number]] Collected:  01/18/18 1922    Order Status:  Completed Specimen:  Blood Updated:  01/23/18 2212     Blood Culture, Routine No growth after 5 days.    Blood culture [[id number]] Collected:  01/17/18 1651    Order Status:  Completed Specimen:  Blood Updated:  01/22/18 2012     Blood Culture, Routine No growth after 5 days.    Blood culture [[id number]] Collected:  01/17/18 1650    Order Status:  Completed Specimen:  Blood Updated:  01/22/18 2012     Blood Culture, Routine No growth after 5 days.    Culture, Fluid  (Aerobic) [[id number]]     Order Status:  No result Specimen:  Body Fluid     Gram stain [[id number]]     Order Status:  No result Specimen:  Body Fluid     Aerobic culture [[id number]]     Order Status:  No result Specimen:  Biopsy from Liver     AFB Culture & Smear [[id number]] Collected:  01/19/18 1101    Order Status:  Completed Specimen:  Abdominal from Ascites Updated:  01/22/18 1424     AFB Culture & Smear Culture in progress     AFB CULTURE STAIN No acid fast bacilli seen.    Aerobic culture [[id number]] Collected:  01/19/18 1140    Order Status:  Completed Specimen:  Pleural Fluid from Pleural Fluid Updated:  01/22/18 0949     Aerobic Bacterial Culture No growth    Culture, Fluid  (Aerobic) [[id number]] Collected:  01/19/18 1101    Order Status:  Completed Specimen:  Abdominal from Ascites Updated:  01/22/18 0949     AEROBIC CULTURE - FLUID No growth     Gram Stain Result No WBC's      No organisms seen    Cryptococcal antigen [[id number]] Collected:  01/20/18 0500    Order Status:  Completed Specimen:  Blood from Blood Updated:  01/20/18 1258     Cryptococcal Ag, Blood Negative    Urine culture [[id number]] Collected:  01/18/18 1903    Order Status:  Completed Specimen:  Urine from Urine, Clean Catch Updated:   01/19/18 2335     Urine Culture, Routine No growth    Gram stain [[id number]] Collected:  01/19/18 1140    Order Status:  Completed Specimen:  Pleural Fluid from Pleural Fluid Updated:  01/19/18 1454     Gram Stain Result No WBC's      No organisms seen    Gram stain [[id number]] Collected:  01/19/18 1101    Order Status:  Canceled Specimen:  Abdominal from Ascites Updated:  01/19/18 1332    Culture, Anaerobic [[id number]] Collected:  01/11/18 1307    Order Status:  Completed Specimen:  Pleural Fluid from Lung, Right Updated:  01/19/18 0718     Anaerobic Culture No anaerobes isolated    Culture, Anaerobe [[id number]] Collected:  01/11/18 1448    Order Status:  Completed Specimen:  Pleural Fluid from Lung, Left Updated:  01/19/18 0718     Anaerobic Culture No anaerobes isolated    Blood culture [[id number]]     Order Status:  Canceled Specimen:  Blood     Blood culture [[id number]]     Order Status:  Canceled Specimen:  Blood           Significant Imaging: I have reviewed all pertinent imaging results/findings within the past 24 hours.

## 2018-01-25 NOTE — PROVATION PATIENT INSTRUCTIONS
Discharge Summary/Instructions after an Endoscopic Procedure  Patient Name: Jhonny Diana  Patient MRN: [id number]  Patient YOB: 1990 Thursday, January 25, 2018  Nati Siddiqui MD  RESTRICTIONS:  During your procedure today, you received medications for sedation.  These   medications may affect your judgment, balance and coordination.  Therefore,   for 24 hours, you have the following restrictions:   - DO NOT drive a car, operate machinery, make legal/financial decisions,   sign important papers or drink alcohol.    ACTIVITY:  The following day: return to full activity including work, except no heavy   lifting, straining or running for 3 days if polyps were removed.  DIET:  Eat and drink normally unless instructed otherwise.     TREATMENT FOR COMMON SIDE EFFECTS:  - Mild abdominal pain, belching, bloating or excessive gas: rest, eat   lightly and use a heating pad.  - Sore Throat: treat with throat lozenges and/or gargle with warm salt   water.  SYMPTOMS TO WATCH FOR AND REPORT TO YOUR PHYSICIAN:  1. Abdominal pain or bloating, other than gas cramps.  2. Chest pain.  3. Back pain.  4. Chills or fever occurring within 24 hours after the procedure.  5. Rectal bleeding, which would show as bright red, maroon, or black stools.   (A tablespoon of blood from the rectum is not serious, especially if   hemorrhoids are present.)  6. Vomiting.  7. Weakness or dizziness.  8. Because air was used during the procedure, expelling large amounts of air   from your rectum or belching is normal.  9. If a bowel prep was taken, you may not have a bowel movement for 1-3   days.  This is normal.  GO DIRECTLY TO THE NEAREST EMERGENCY ROOM IF YOU HAVE ANY OF THE FOLLOWING:      Difficulty breathing  Chills and/or fever over 101 F   Persistent vomiting and/or vomiting blood   Severe abdominal pain   Severe chest pain   Black, tarry stools   Bleeding- more than one tablespoon   Any other symptom or condition that you may feel  needs urgent attention  Your doctor recommends these additional instructions:  If any biopsies were taken, your doctor s clinic will contact you in 1 to 2   weeks with any results.  We are waiting for your pathology results.   Resume your previous diet.   Continue your present medications.   The findings and recommendations have been discussed with you.  For questions, problems or results please call your physician - Nati Siddiqui MD at Work:  [phone].  [facility], EMERGENCY ROOM PHONE NUMBER: [phone]  IF A COMPLICATION OR EMERGENCY SITUATION ARISES AND YOU ARE UNABLE TO REACH   YOUR PHYSICIAN - GO DIRECTLY TO THE EMERGENCY ROOM.  Nati Siddiqui MD  1/25/2018 1:56:02 PM  This report has been verified and signed electronically.

## 2018-01-25 NOTE — PLAN OF CARE
Problem: Patient Care Overview  Goal: Plan of Care Review  3.5 HR HD completed, net fluid removed =3000 mls, target goal achieved. Pt tolerated well. Pt received  Epogen 10,000units IV with dialysis. Dialysis catheter ports Hep.locked, ends capped and secured. Report given to SISSY Johnson.

## 2018-01-25 NOTE — PROGRESS NOTES
Pt arrived via wheel chair per pt's escort, Maintainance HD initiated via RT IJ Tunneled catheter without difficulty.

## 2018-01-25 NOTE — ANESTHESIA POSTPROCEDURE EVALUATION
"Anesthesia Post Evaluation    Patient: Jhonny Diana    Procedure(s) Performed: Procedure(s) (LRB):  ESOPHAGOGASTRODUODENOSCOPY (EGD) (N/A)    Final Anesthesia Type: general  Patient location during evaluation: PACU  Patient participation: Yes- Able to Participate  Level of consciousness: awake and alert  Post-procedure vital signs: reviewed and stable  Pain management: adequate  Airway patency: patent  PONV status at discharge: No PONV  Anesthetic complications: no      Cardiovascular status: blood pressure returned to baseline  Respiratory status: unassisted  Hydration status: euvolemic  Follow-up not needed.        Visit Vitals  /73   Pulse 90   Temp 37.2 °C (99 °F) (Skin)   Resp 18   Ht 5' 6" (1.676 m)   Wt 70.1 kg (154 lb 8.7 oz)   SpO2 95%   BMI 24.94 kg/m²       Pain/Diogenes Score: Pain Assessment Performed: Yes (1/25/2018  2:13 PM)  Presence of Pain: denies (1/25/2018  2:13 PM)  Pain Rating Prior to Med Admin: 7 (1/25/2018  2:18 PM)  Pain Rating Post Med Admin: 1 (1/25/2018  4:43 AM)  Diogenes Score: 10 (1/25/2018  2:13 PM)      "

## 2018-01-25 NOTE — ASSESSMENT & PLAN NOTE
- Post op course complicated by fevers and hyperbilirubinemia.  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  - AST/ALT normal. Tbili/alkphos remain elevated.  - Liver US 1/8 showed 3.9 cm complex fluid collection anterior to right lobe and moderate nonspecific complex ascites inferior to transplant.   - IR placed drain 1/11/18, cell count negative for infection.  - PBS consulted. PBS not comfortable proceeding with EUS with liver biopsy given fever and possible infectious process going on. ERCP 1/17 with stone and sludge. Pt placed on Actigall.  - Bili remains elevated.  - Liver biopsy 1/23 without rejection.

## 2018-01-25 NOTE — DISCHARGE INSTRUCTIONS

## 2018-01-26 PROBLEM — J90 PLEURAL EFFUSION, RIGHT: Status: RESOLVED | Noted: 2018-01-11 | Resolved: 2018-01-26

## 2018-01-26 PROBLEM — D70.9 NEUTROPENIA: Status: RESOLVED | Noted: 2018-01-11 | Resolved: 2018-01-26

## 2018-01-26 PROBLEM — R50.9 FEVER: Status: RESOLVED | Noted: 2018-01-11 | Resolved: 2018-01-26

## 2018-01-26 LAB
ALBUMIN SERPL BCP-MCNC: 2 G/DL
ALP SERPL-CCNC: 178 U/L
ALT SERPL W/O P-5'-P-CCNC: 7 U/L
ANION GAP SERPL CALC-SCNC: 9 MMOL/L
AST SERPL-CCNC: 34 U/L
BACTERIA SPEC ANAEROBE CULT: NORMAL
BASOPHILS # BLD AUTO: 0.03 K/UL
BASOPHILS NFR BLD: 0.6 %
BILIRUB SERPL-MCNC: 2.8 MG/DL
BUN SERPL-MCNC: 15 MG/DL
CALCIUM SERPL-MCNC: 9 MG/DL
CHLORIDE SERPL-SCNC: 100 MMOL/L
CO2 SERPL-SCNC: 25 MMOL/L
CREAT SERPL-MCNC: 3 MG/DL
CYCLOSPORINE BLD LC/MS/MS-MCNC: 262 NG/ML
DIFFERENTIAL METHOD: ABNORMAL
EOSINOPHIL # BLD AUTO: 0.2 K/UL
EOSINOPHIL NFR BLD: 4.6 %
ERYTHROCYTE [DISTWIDTH] IN BLOOD BY AUTOMATED COUNT: 16 %
EST. GFR  (AFRICAN AMERICAN): 31.2 ML/MIN/1.73 M^2
EST. GFR  (NON AFRICAN AMERICAN): 27 ML/MIN/1.73 M^2
GLUCOSE SERPL-MCNC: 99 MG/DL
HCT VFR BLD AUTO: 21.9 %
HGB BLD-MCNC: 7.5 G/DL
IMM GRANULOCYTES # BLD AUTO: 0.06 K/UL
IMM GRANULOCYTES NFR BLD AUTO: 1.3 %
LYMPHOCYTES # BLD AUTO: 1.1 K/UL
LYMPHOCYTES NFR BLD: 23.9 %
MAGNESIUM SERPL-MCNC: 2.3 MG/DL
MCH RBC QN AUTO: 31.8 PG
MCHC RBC AUTO-ENTMCNC: 34.2 G/DL
MCV RBC AUTO: 93 FL
MONOCYTES # BLD AUTO: 0.8 K/UL
MONOCYTES NFR BLD: 16.1 %
NEUTROPHILS # BLD AUTO: 2.6 K/UL
NEUTROPHILS NFR BLD: 53.5 %
NRBC BLD-RTO: 0 /100 WBC
PHOSPHATE SERPL-MCNC: 2.9 MG/DL
PLATELET # BLD AUTO: 178 K/UL
PMV BLD AUTO: 10.7 FL
POTASSIUM SERPL-SCNC: 3.8 MMOL/L
PROT SERPL-MCNC: 5.7 G/DL
RBC # BLD AUTO: 2.36 M/UL
SODIUM SERPL-SCNC: 134 MMOL/L
WBC # BLD AUTO: 4.77 K/UL

## 2018-01-26 PROCEDURE — 63600175 PHARM REV CODE 636 W HCPCS: Performed by: NURSE PRACTITIONER

## 2018-01-26 PROCEDURE — 85025 COMPLETE CBC W/AUTO DIFF WBC: CPT

## 2018-01-26 PROCEDURE — 25000003 PHARM REV CODE 250: Performed by: NURSE PRACTITIONER

## 2018-01-26 PROCEDURE — 80158 DRUG ASSAY CYCLOSPORINE: CPT

## 2018-01-26 PROCEDURE — B4185 PARENTERAL SOL 10 GM LIPIDS: HCPCS | Performed by: NURSE PRACTITIONER

## 2018-01-26 PROCEDURE — 63600175 PHARM REV CODE 636 W HCPCS: Performed by: PHYSICIAN ASSISTANT

## 2018-01-26 PROCEDURE — 25000003 PHARM REV CODE 250: Performed by: PHYSICIAN ASSISTANT

## 2018-01-26 PROCEDURE — 84100 ASSAY OF PHOSPHORUS: CPT

## 2018-01-26 PROCEDURE — 80053 COMPREHEN METABOLIC PANEL: CPT

## 2018-01-26 PROCEDURE — 20600001 HC STEP DOWN PRIVATE ROOM

## 2018-01-26 PROCEDURE — A4217 STERILE WATER/SALINE, 500 ML: HCPCS | Performed by: NURSE PRACTITIONER

## 2018-01-26 PROCEDURE — 99233 SBSQ HOSP IP/OBS HIGH 50: CPT | Mod: ,,, | Performed by: NURSE PRACTITIONER

## 2018-01-26 PROCEDURE — 83735 ASSAY OF MAGNESIUM: CPT

## 2018-01-26 RX ORDER — ATOVAQUONE 750 MG/5ML
1500 SUSPENSION ORAL DAILY
Status: DISCONTINUED | OUTPATIENT
Start: 2018-01-27 | End: 2018-02-24

## 2018-01-26 RX ORDER — SODIUM CHLORIDE 9 MG/ML
INJECTION, SOLUTION INTRAVENOUS ONCE
Status: DISCONTINUED | OUTPATIENT
Start: 2018-01-26 | End: 2018-01-27

## 2018-01-26 RX ORDER — SODIUM CHLORIDE 9 MG/ML
INJECTION, SOLUTION INTRAVENOUS
Status: DISCONTINUED | OUTPATIENT
Start: 2018-01-26 | End: 2018-01-27

## 2018-01-26 RX ORDER — METOCLOPRAMIDE 5 MG/1
5 TABLET ORAL
Status: DISCONTINUED | OUTPATIENT
Start: 2018-01-26 | End: 2018-02-01

## 2018-01-26 RX ADMIN — METOCLOPRAMIDE HYDROCHLORIDE 5 MG: 5 TABLET ORAL at 08:01

## 2018-01-26 RX ADMIN — CYCLOSPORINE 150 MG: 100 CAPSULE, LIQUID FILLED ORAL at 09:01

## 2018-01-26 RX ADMIN — CYCLOSPORINE 150 MG: 100 CAPSULE, LIQUID FILLED ORAL at 06:01

## 2018-01-26 RX ADMIN — HEPARIN SODIUM 5000 UNITS: 5000 INJECTION, SOLUTION INTRAVENOUS; SUBCUTANEOUS at 09:01

## 2018-01-26 RX ADMIN — OXYCODONE HYDROCHLORIDE 5 MG: 5 TABLET ORAL at 01:01

## 2018-01-26 RX ADMIN — OMEPRAZOLE 40 MG: 40 CAPSULE, DELAYED RELEASE ORAL at 04:01

## 2018-01-26 RX ADMIN — URSODIOL 300 MG: 300 CAPSULE ORAL at 08:01

## 2018-01-26 RX ADMIN — ATOVAQUONE 1500 MG: 750 SUSPENSION ORAL at 09:01

## 2018-01-26 RX ADMIN — LEVOTHYROXINE SODIUM 75 MCG: 75 TABLET ORAL at 06:01

## 2018-01-26 RX ADMIN — DOCUSATE SODIUM 100 MG: 100 CAPSULE, LIQUID FILLED ORAL at 06:01

## 2018-01-26 RX ADMIN — URSODIOL 300 MG: 300 CAPSULE ORAL at 09:01

## 2018-01-26 RX ADMIN — OMEPRAZOLE 40 MG: 40 CAPSULE, DELAYED RELEASE ORAL at 06:01

## 2018-01-26 RX ADMIN — LEVETIRACETAM 500 MG: 500 TABLET ORAL at 09:01

## 2018-01-26 RX ADMIN — ONDANSETRON HYDROCHLORIDE 4 MG: 2 INJECTION, SOLUTION INTRAMUSCULAR; INTRAVENOUS at 09:01

## 2018-01-26 RX ADMIN — OXYCODONE HYDROCHLORIDE 5 MG: 5 TABLET ORAL at 02:01

## 2018-01-26 RX ADMIN — MAGNESIUM SULFATE HEPTAHYDRATE: 500 INJECTION, SOLUTION INTRAMUSCULAR; INTRAVENOUS at 09:01

## 2018-01-26 RX ADMIN — OXYCODONE HYDROCHLORIDE 5 MG: 5 TABLET ORAL at 08:01

## 2018-01-26 RX ADMIN — METOCLOPRAMIDE HYDROCHLORIDE 5 MG: 5 TABLET ORAL at 11:01

## 2018-01-26 RX ADMIN — METOCLOPRAMIDE HYDROCHLORIDE 5 MG: 5 TABLET ORAL at 04:01

## 2018-01-26 RX ADMIN — OXYCODONE HYDROCHLORIDE 5 MG: 5 TABLET ORAL at 06:01

## 2018-01-26 RX ADMIN — LEVETIRACETAM 500 MG: 500 TABLET ORAL at 08:01

## 2018-01-26 RX ADMIN — OXYCODONE HYDROCHLORIDE 5 MG: 5 TABLET ORAL at 04:01

## 2018-01-26 RX ADMIN — POLYETHYLENE GLYCOL 3350 17 G: 17 POWDER, FOR SOLUTION ORAL at 08:01

## 2018-01-26 RX ADMIN — OXYCODONE HYDROCHLORIDE 5 MG: 5 TABLET ORAL at 10:01

## 2018-01-26 RX ADMIN — PSYLLIUM HUSK 1 PACKET: 3.4 POWDER ORAL at 08:01

## 2018-01-26 RX ADMIN — HEPARIN SODIUM 5000 UNITS: 5000 INJECTION, SOLUTION INTRAVENOUS; SUBCUTANEOUS at 08:01

## 2018-01-26 RX ADMIN — SOYBEAN OIL 250 ML: 20 INJECTION, SOLUTION INTRAVENOUS at 09:01

## 2018-01-26 NOTE — SUBJECTIVE & OBJECTIVE
Scheduled Meds:   sodium chloride 0.9%   Intravenous Once    [START ON 1/27/2018] atovaquone  1,500 mg Oral Daily    cycloSPORINE modified  150 mg Oral BID    docusate sodium  100 mg Oral TID PC    epoetin maurisio (PROCRIT) injection  10,000 Units Intravenous Every Tues, Thurs, Sat    fat emulsion 20%  250 mL Intravenous Daily    heparin (porcine)  5,000 Units Subcutaneous Q12H    levETIRAcetam  500 mg Oral BID    levothyroxine  75 mcg Oral Before breakfast    metoclopramide HCl  5 mg Oral QID (AC & HS)    omeprazole  40 mg Oral BID AC    polyethylene glycol  17 g Oral BID    psyllium husk (aspartame)  3.4 g Oral BID    ursodiol  300 mg Oral BID     Continuous Infusions:   TPN ADULT CENTRAL LINE CUSTOM 40 mL/hr at 01/25/18 2153    TPN ADULT CENTRAL LINE CUSTOM       PRN Meds:sodium chloride 0.9%, sodium chloride 0.9%, acetaminophen, heparin (porcine), omnipaque, ondansetron, ondansetron, oxyCODONE, prochlorperazine, simethicone, sodium chloride 0.9%    Review of Systems   Constitutional: Positive for activity change, appetite change and fatigue. Negative for chills and fever.   HENT: Negative.  Negative for congestion and facial swelling.    Eyes: Negative for pain, discharge and visual disturbance.   Respiratory: Negative for cough, shortness of breath and wheezing.    Cardiovascular: Positive for leg swelling. Negative for chest pain.   Gastrointestinal: Positive for abdominal distention, abdominal pain and constipation. Negative for nausea and vomiting.   Genitourinary: Positive for decreased urine volume. Negative for difficulty urinating.        Anuric   Musculoskeletal: Negative for arthralgias, back pain and myalgias.   Skin: Positive for wound. Negative for color change and rash.   Allergic/Immunologic: Positive for immunocompromised state.   Neurological: Positive for weakness. Negative for dizziness, seizures and headaches.   Psychiatric/Behavioral: Positive for decreased concentration and  dysphoric mood. Negative for confusion. The patient is not nervous/anxious.    All other systems reviewed and are negative.    Objective:     Vital Signs (Most Recent):  Temp: 98.3 °F (36.8 °C) (01/26/18 1117)  Pulse: 84 (01/26/18 1117)  Resp: 17 (01/26/18 1117)  BP: 120/74 (01/26/18 1117)  SpO2: 96 % (01/26/18 1117) Vital Signs (24h Range):  Temp:  [98.3 °F (36.8 °C)-99.9 °F (37.7 °C)] 98.3 °F (36.8 °C)  Pulse:  [82-96] 84  Resp:  [16-20] 17  SpO2:  [95 %-97 %] 96 %  BP: (116-137)/(66-75) 120/74     Weight: 70.1 kg (154 lb 8.7 oz)  Body mass index is 24.94 kg/m².    Intake/Output - Last 3 Shifts       01/24 0700 - 01/25 0659 01/25 0700 - 01/26 0659 01/26 0700 - 01/27 0659    P.O. 500 260     I.V. (mL/kg)  350 (5)     Other  650     IV Piggyback 50       1822     Total Intake(mL/kg) 1374 (19.6) 3082 (44)     Urine (mL/kg/hr)  0 (0)     Emesis/NG output       Other  3650 (2.2)     Stool  0 (0)     Total Output   3650      Net +1374 -568.1             Urine Occurrence 0 x 0 x     Stool Occurrence 0 x 0 x           Physical Exam   Constitutional: He is oriented to person, place, and time. He appears well-developed. No distress.   Temporal and distal extremity muscle wasting   HENT:   Head: Normocephalic and atraumatic.   Mouth/Throat: No oropharyngeal exudate.   Eyes: EOM are normal. Pupils are equal, round, and reactive to light. Scleral icterus is present.   Neck: Normal range of motion. Neck supple. No JVD present. No thyromegaly present.   Cardiovascular: Normal rate, regular rhythm, normal heart sounds and intact distal pulses.    No murmur heard.  Pulmonary/Chest: Effort normal. No respiratory distress. He has decreased breath sounds in the right middle field, the right lower field and the left lower field. He has no wheezes. He exhibits no tenderness.   Diminished to RLL   Abdominal: Soft. Bowel sounds are normal. He exhibits ascites. He exhibits no distension. There is tenderness (mild). There is no  rebound and no guarding.   Dressing to chevron.  Wd vac removed 1/12/18  Dependent edema present R flank; surrounding erythema   Musculoskeletal: Normal range of motion. He exhibits edema (2+ LE edema). He exhibits no tenderness.   Neurological: He is alert and oriented to person, place, and time. He has normal reflexes.   Skin: Skin is warm and dry. Capillary refill takes 2 to 3 seconds. He is not diaphoretic. No erythema.   jaundice   Psychiatric: He has a normal mood and affect. His behavior is normal. Judgment and thought content normal. His mood appears not anxious.   Depressed mood   Nursing note and vitals reviewed.      Laboratory:  Immunosuppressants         Stop Route Frequency     cycloSPORINE modified capsule 150 mg      -- Oral 2 times daily        CBC:     Recent Labs  Lab 01/26/18  0500   WBC 4.77   RBC 2.36*   HGB 7.5*   HCT 21.9*      MCV 93   MCH 31.8*   MCHC 34.2     CMP:     Recent Labs  Lab 01/26/18  0500   GLU 99   CALCIUM 9.0   ALBUMIN 2.0*   PROT 5.7*   *   K 3.8   CO2 25      BUN 15   CREATININE 3.0*   ALKPHOS 178*   ALT 7*   AST 34   BILITOT 2.8*     Labs within the past 24 hours have been reviewed.    Diagnostic Results:  I have personally reviewed all pertinent imaging studies.

## 2018-01-26 NOTE — PLAN OF CARE
Problem: Patient Care Overview  Goal: Plan of Care Review  Outcome: Ongoing (interventions implemented as appropriate)  AAOX4.  VSS.  Pt complaining of pain, prn pain meds being administered.  Pt walking halls with mother.  L IJ triple lumen has TPN infusing at 40cc/hr, and lipids at 20.8cc/hr.  HD with 3L taken off.  Mother is at the bedside.  Bed is in lowest position, call bell is in reach, and pt instructed to call nurse when needing assistance.  Will continue to monitor.

## 2018-01-26 NOTE — TREATMENT PLAN
Treatment Plan  01/25/2018  8:14 PM    EGD performed today with impression and findings below.    Impression:             - Normal esophagus.  - Z-line regular, 37 cm from the incisors.  - 3 cm hiatal hernia.  - Erythematous mucosa in the gastric body. Biopsied.  - Normal examined duodenum.  - Biliary stent in the duodenum.  - Four biopsies were obtained in the second portion of the duodenum.    Recommendation:         - Await pathology results.  - Return patient to [facility] for ongoing care.  - Resume previous diet.  - Continue present medications.  - The findings and recommendations were discussed with the patient.    --As stated on initial consult recommend gastric emptying study as well as UGI series with small bowel follow through at this point    Divya Booker M.D.  Gastroenterology Fellow, PGY-IV  Pager: [phone]  [facility]

## 2018-01-26 NOTE — PROGRESS NOTES
Follow up on chevron wound  Excellent wound contracture and new epithelial tissue noted.     01/26/18 1125       Incision/Site 10/30/17 0911 abdomen transverse   Date First Assessed/Time First Assessed: 10/30/17 0911   Present Prior to Hospital Arrival?: Yes  Location: abdomen  Orientation: transverse   Wound Image    Incision WDL ex   Dressing Appearance Intact;Clean   Drainage Amount None   Drainage Characteristics/Odor No odor   Appearance Red;Epithelialization   Red (%), Wound Tissue Color 100 %   Periwound Area Scar tissue   Wound Edges Open   Wound Length (cm) 0.7   Wound Width (cm) 10   Depth (cm) 0.1   Care Cleansed with:;Sterile normal saline   Dressing Applied;Foam     Zoë Murphy RN CWON  [id number]

## 2018-01-26 NOTE — PROGRESS NOTES
[facility]  Liver Transplant  Progress Note    Patient Name: Jhonny Diana  MRN: [id number]  Admission Date: 2018  Hospital Length of Stay: 15 days  Code Status: Full Code  Primary Care Provider: Primary Doctor No  Post-Operative Day: 99    ORGAN:   LIVER  Disease Etiology: Acute Alcoholic Hepatitis  Donor Type:    - Brain Death  CDC High Risk:   No  Donor CMV Status:   Donor CMV Status: Positive  Donor HBcAB:   Negative  Donor HCV Status:   Negative  Whole or Partial: Whole Liver  Biliary Anastomosis: End to End  Arterial Anatomy: Standard  Subjective:     History of Present Illness:  Jhonny Diana is a 28 y/o male with past medical history of alcoholic cirrhosis.  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 Found to have peritonitis in November (WBC 5000, 75% PNM) neg for bile leak. He was treated initially with vanc/cefepime. Repeat cell counts  with some improvement (WBC 1400, 45% PNM). He has undergone multiple paracenteses as well as abscess drainage of perihepatic fluid collections and treated with antimicrobial therapy but no positive cultures. Of note, biliary stricture also identified and ERCP performed on 2017 with sphincterotomy and biliary stent placed. Liver tests still have not normalized despite intervention, bilirubin and AP remain elevated. Other pertinent PMH current wound vac in place 2/2 wound infection, malnutrition requiring TPN for short course and ongoing hypoalbuminemia, and seizure activity while on prograf and has since been switched to cyclosporine without reoccurrence.  He presented to the ER for fever, abdominal pain, and N/V. He reports fever (103) for 1  day prior. Overnight, he developed N/V, reports small amount of green emesis with new left sided pain. He also endorses worsening SOB with exertion. He was scheduled as an outpatient for follow up paracentesis and IR drainage of fluid collection. CXR in ER shows large pleural effusion with subsegmental atelectasis. Infectious work up initiated in ER. His ANC is 900. Broad spectrum antibiotics initiated in ED. He denies chest pain, palpitations, diarrhea, constipation, or back pain. Denies any sick contacts.    Hospital Course:  Thoracentesis (1.2L off), Paracentesis (1.6L off), and IR drainage of fluid collection 1/11, all fluids negative for infection. ID consulted. Broad spectrum antibiotics d/c'd 1/15. Chronically malnourished with poor PO intake, prealbumin 7. Shane tube placed 1/15 for tube feedings.     ERCP 1/17 with sludge and a biliary stone which was removed and stent exchanged.  Remained with n/v; therefore, TF remained on hold and TPN continued.   Pt with fever s/p ERCP on 1/17 which continued until 1/19.  Vanc/cefepime restarted.  Blood cx 1/17 and 1/18 NGTD.  ID reconsulted.  Fungal markers sent.  CT C/A/P obtained.  With large R pleural effusion and ascites- both drained 1/19 and negative for infx per cell count. Pt afebrile 1/20. Liver biopsy 1/23 - without rejection.  EGD 1/25 - unremarkable for source of GI symptoms.  Resume diet along with other GI recommendations.     Interval History: No acute events over night.  GI consulted EGD completed.  Will plan for small bowel follow through on Monday.  Place a SHANE tube today for tube feedings.  Continue TPN.  Will also start reglan to aid with peristalsis.  Encouraged ambulation multiple times daily. Monitor.     Scheduled Meds:   sodium chloride 0.9%   Intravenous Once    [START ON 1/27/2018] atovaquone  1,500 mg Oral Daily    cycloSPORINE modified  150 mg Oral BID    docusate sodium  100 mg Oral TID PC    epoetin maurisio (PROCRIT) injection  10,000  Units Intravenous Every Tues, Thurs, Sat    fat emulsion 20%  250 mL Intravenous Daily    heparin (porcine)  5,000 Units Subcutaneous Q12H    levETIRAcetam  500 mg Oral BID    levothyroxine  75 mcg Oral Before breakfast    metoclopramide HCl  5 mg Oral QID (AC & HS)    omeprazole  40 mg Oral BID AC    polyethylene glycol  17 g Oral BID    psyllium husk (aspartame)  3.4 g Oral BID    ursodiol  300 mg Oral BID     Continuous Infusions:   TPN ADULT CENTRAL LINE CUSTOM 40 mL/hr at 01/25/18 2153    TPN ADULT CENTRAL LINE CUSTOM       PRN Meds:sodium chloride 0.9%, sodium chloride 0.9%, acetaminophen, heparin (porcine), omnipaque, ondansetron, ondansetron, oxyCODONE, prochlorperazine, simethicone, sodium chloride 0.9%    Review of Systems   Constitutional: Positive for activity change, appetite change and fatigue. Negative for chills and fever.   HENT: Negative.  Negative for congestion and facial swelling.    Eyes: Negative for pain, discharge and visual disturbance.   Respiratory: Negative for cough, shortness of breath and wheezing.    Cardiovascular: Positive for leg swelling. Negative for chest pain.   Gastrointestinal: Positive for abdominal distention, abdominal pain and constipation. Negative for nausea and vomiting.   Genitourinary: Positive for decreased urine volume. Negative for difficulty urinating.        Anuric   Musculoskeletal: Negative for arthralgias, back pain and myalgias.   Skin: Positive for wound. Negative for color change and rash.   Allergic/Immunologic: Positive for immunocompromised state.   Neurological: Positive for weakness. Negative for dizziness, seizures and headaches.   Psychiatric/Behavioral: Positive for decreased concentration and dysphoric mood. Negative for confusion. The patient is not nervous/anxious.    All other systems reviewed and are negative.    Objective:     Vital Signs (Most Recent):  Temp: 98.3 °F (36.8 °C) (01/26/18 1117)  Pulse: 84 (01/26/18 1117)  Resp: 17  (01/26/18 1117)  BP: 120/74 (01/26/18 1117)  SpO2: 96 % (01/26/18 1117) Vital Signs (24h Range):  Temp:  [98.3 °F (36.8 °C)-99.9 °F (37.7 °C)] 98.3 °F (36.8 °C)  Pulse:  [82-96] 84  Resp:  [16-20] 17  SpO2:  [95 %-97 %] 96 %  BP: (116-137)/(66-75) 120/74     Weight: 70.1 kg (154 lb 8.7 oz)  Body mass index is 24.94 kg/m².    Intake/Output - Last 3 Shifts       01/24 0700 - 01/25 0659 01/25 0700 - 01/26 0659 01/26 0700 - 01/27 0659    P.O. 500 260     I.V. (mL/kg)  350 (5)     Other  650     IV Piggyback 50       1822     Total Intake(mL/kg) 1374 (19.6) 3082 (44)     Urine (mL/kg/hr)  0 (0)     Emesis/NG output       Other  3650 (2.2)     Stool  0 (0)     Total Output   3650      Net +1374 -568.1             Urine Occurrence 0 x 0 x     Stool Occurrence 0 x 0 x           Physical Exam   Constitutional: He is oriented to person, place, and time. He appears well-developed. No distress.   Temporal and distal extremity muscle wasting   HENT:   Head: Normocephalic and atraumatic.   Mouth/Throat: No oropharyngeal exudate.   Eyes: EOM are normal. Pupils are equal, round, and reactive to light. Scleral icterus is present.   Neck: Normal range of motion. Neck supple. No JVD present. No thyromegaly present.   Cardiovascular: Normal rate, regular rhythm, normal heart sounds and intact distal pulses.    No murmur heard.  Pulmonary/Chest: Effort normal. No respiratory distress. He has decreased breath sounds in the right middle field, the right lower field and the left lower field. He has no wheezes. He exhibits no tenderness.   Diminished to RLL   Abdominal: Soft. Bowel sounds are normal. He exhibits ascites. He exhibits no distension. There is tenderness (mild). There is no rebound and no guarding.   Dressing to chevron.  Wd vac removed 1/12/18  Dependent edema present R flank; surrounding erythema   Musculoskeletal: Normal range of motion. He exhibits edema (2+ LE edema). He exhibits no tenderness.   Neurological: He is  alert and oriented to person, place, and time. He has normal reflexes.   Skin: Skin is warm and dry. Capillary refill takes 2 to 3 seconds. He is not diaphoretic. No erythema.   jaundice   Psychiatric: He has a normal mood and affect. His behavior is normal. Judgment and thought content normal. His mood appears not anxious.   Depressed mood   Nursing note and vitals reviewed.      Laboratory:  Immunosuppressants         Stop Route Frequency     cycloSPORINE modified capsule 150 mg      -- Oral 2 times daily        CBC:     Recent Labs  Lab 01/26/18  0500   WBC 4.77   RBC 2.36*   HGB 7.5*   HCT 21.9*      MCV 93   MCH 31.8*   MCHC 34.2     CMP:     Recent Labs  Lab 01/26/18  0500   GLU 99   CALCIUM 9.0   ALBUMIN 2.0*   PROT 5.7*   *   K 3.8   CO2 25      BUN 15   CREATININE 3.0*   ALKPHOS 178*   ALT 7*   AST 34   BILITOT 2.8*     Labs within the past 24 hours have been reviewed.    Diagnostic Results:  I have personally reviewed all pertinent imaging studies.    Assessment/Plan:     Liver transplanted    - Post op course complicated by fevers and hyperbilirubinemia.  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  - AST/ALT normal. Tbili/alkphos remain elevated.  - Liver US 1/8 showed 3.9 cm complex fluid collection anterior to right lobe and moderate nonspecific complex ascites inferior to transplant.   - IR placed drain 1/11/18, cell count negative for infection.  - PBS consulted. PBS not comfortable proceeding with EUS with liver biopsy given fever and possible infectious process going on. ERCP 1/17 with stone and sludge. Pt placed on Actigall - treated with 10 day course of abx.  ID now signed off.  - Bili remains elevated.  - Liver biopsy 1/23 without rejection.        Acute renal failure with tubular necrosis    - HD resumed on previous admission. Now anuric and dialyzes M-W-F.  - Nephrology following.    - HD Sat since held Friday for thora/para.  - HD transitioned to TTS  schedule.  - ktm consulted for possible kidney transplant        Delayed surgical wound healing    - wd vac removed 1/12/18.  Wound healing well. Aquacel AG and Mepilex border dressing applied via wound care recs.           Long-term use of immunosuppressant medication    - Maintenance IS with cyclosporine. MMF on hold for infections and neutropenia. Continue to check cyclosporine level daily. Assess for toxicity and adjust level as needed.        Prophylactic immunotherapy    - See long term use of immunosuppression.         Severe protein-calorie malnutrition    - Poor PO intake since transplant requiring short course of TPN during previous hospital stay.   - albumin remains decreased but appetite slowly improving per pt.   - Dietary consulted. Will need to closely monitor PO intake.   - supplements also ordered.   - SHANE with tube feeding start 1/16. Stopped 1/17/18, was not tolerating. [place] removed 1/20.  - prealbumin 7 on 1/15.  - TPN started 1/16/18.   - Pt able to eat minimally 1/21 and 1/22 which is an improvement.  - gi consulted - EGD pending, increased ppi bid, miralax bid, and metamucil bid, decreased narcotics  - start reglan, place SHANE tube with tube feeds  - plan for small bowel follow through Monday, possible peg next week.        Constipation    - Enema ordered 1/20 and 1/21 with BM  - Encourage ambulation.  - decrease narcotics  - metamucil and miralax bid            At risk for opportunistic infections    - Hold Valcyte as WBC low.  CMV PCR 1/11, 1/18 undetected.  - bactrim held.          Anemia of chronic disease    - PRBC x 1 1/19  - PRBC x 1 1/21  - Continue daily CBC.  - H&H trending down but stable.  - consider blood transfusion with next HD.        Other ascites    - Paracentesis performed 1/11/18 with 1600 ml removed.    - fluid negative for infection.   - Repeat para 1/19 negative for infection per cell count  - no fluid seen for paracentesis on 1/23.        Nausea and vomiting    - h/o  constipation, reports having regular BM's with bowel regimen  - vomiting episode x 1 at home. Anti-emetics ordered PRN.  - KUB repeated 1/14/18 given increased abdominal pain- mild, generalized bowel distention suggesting ileus.  Diet changed to clears as tolerated for bowel rest   - SHANE tube placed 1/16 for tube feeds. Not tolerating tube feeds, stopped 1/17/18.  - D/C shane 1/19  - chronic constipation, Cont bowel regimen.   - GI consulted.              VTE Risk Mitigation         Ordered     heparin (porcine) injection 1,000 Units  As needed (PRN)     Route:  Intra-Catheter        01/12/18 1017     heparin (porcine) injection 5,000 Units  Every 12 hours     Route:  Subcutaneous        01/12/18 0926     Medium Risk of VTE  Once      01/11/18 0351     Place sequential compression device  Until discontinued      01/11/18 0351          The patients clinical status was discussed at multidisplinary rounds, involving transplant surgery, transplant medicine, pharmacy, nursing, nutrition, and social work    Discharge Planning: not candidate at this time  Monitor kidney function --> may need outpt HD  Monitor HH needs vs rehab for deconditioned status      Seda Camacho, NP  Liver Transplant  [facility]

## 2018-01-26 NOTE — ASSESSMENT & PLAN NOTE
- Post op course complicated by fevers and hyperbilirubinemia.  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  - AST/ALT normal. Tbili/alkphos remain elevated.  - Liver US 1/8 showed 3.9 cm complex fluid collection anterior to right lobe and moderate nonspecific complex ascites inferior to transplant.   - IR placed drain 1/11/18, cell count negative for infection.  - PBS consulted. PBS not comfortable proceeding with EUS with liver biopsy given fever and possible infectious process going on. ERCP 1/17 with stone and sludge. Pt placed on Actigall - treated with 10 day course of abx.  ID now signed off.  - Bili remains elevated.  - Liver biopsy 1/23 without rejection.

## 2018-01-26 NOTE — ASSESSMENT & PLAN NOTE
- Enema ordered 1/20 and 1/21 with BM  - Encourage ambulation.  - decrease narcotics  - metamucil and miralax bid

## 2018-01-26 NOTE — ASSESSMENT & PLAN NOTE
- Poor PO intake since transplant requiring short course of TPN during previous hospital stay.   - albumin remains decreased but appetite slowly improving per pt.   - Dietary consulted. Will need to closely monitor PO intake.   - supplements also ordered.   - SHANE with tube feeding start 1/16. Stopped 1/17/18, was not tolerating. Shane removed 1/20.  - prealbumin 7 on 1/15.  - TPN started 1/16/18.   - Pt able to eat minimally 1/21 and 1/22 which is an improvement.  - gi consulted - EGD pending, increased ppi bid, miralax bid, and metamucil bid, decreased narcotics  - start reglan, place SHANE tube with tube feeds  - plan for small bowel follow through Monday, possible peg next week.

## 2018-01-26 NOTE — PROGRESS NOTES
TF Recommendations requested by SAMREEN Stack.     TF Recommendations: Novasource renal @ goal rate of 45mL/hr to provide 2160kcal, 98g protein, and 774mL fluid.    Initiate at 10mL/hr and increase slowly as tolerated to goal rate of 45mL/hr. Additional water flushes per MD. Hold for residuals >500mL.     Novasource does not contain fiber. Recommend psyllium supplements to be given with water flushes at bedside.     RD to follow up 2x/week.

## 2018-01-27 LAB
ALBUMIN SERPL BCP-MCNC: 2 G/DL
ALP SERPL-CCNC: 168 U/L
ALT SERPL W/O P-5'-P-CCNC: 5 U/L
ANION GAP SERPL CALC-SCNC: 9 MMOL/L
AST SERPL-CCNC: 25 U/L
BASOPHILS # BLD AUTO: 0.03 K/UL
BASOPHILS NFR BLD: 0.5 %
BILIRUB SERPL-MCNC: 2.8 MG/DL
BUN SERPL-MCNC: 24 MG/DL
CALCIUM SERPL-MCNC: 9.4 MG/DL
CHLORIDE SERPL-SCNC: 99 MMOL/L
CMV DNA SERPL NAA+PROBE-ACNC: NORMAL IU/ML
CO2 SERPL-SCNC: 25 MMOL/L
CREAT SERPL-MCNC: 3.9 MG/DL
CYCLOSPORINE BLD LC/MS/MS-MCNC: 251 NG/ML
DIFFERENTIAL METHOD: ABNORMAL
EOSINOPHIL # BLD AUTO: 0.2 K/UL
EOSINOPHIL NFR BLD: 4.3 %
ERYTHROCYTE [DISTWIDTH] IN BLOOD BY AUTOMATED COUNT: 15.7 %
EST. GFR  (AFRICAN AMERICAN): 22.7 ML/MIN/1.73 M^2
EST. GFR  (NON AFRICAN AMERICAN): 19.7 ML/MIN/1.73 M^2
GLUCOSE SERPL-MCNC: 102 MG/DL
HCT VFR BLD AUTO: 22 %
HGB BLD-MCNC: 7.4 G/DL
IMM GRANULOCYTES # BLD AUTO: 0.04 K/UL
IMM GRANULOCYTES NFR BLD AUTO: 0.7 %
LYMPHOCYTES # BLD AUTO: 1.2 K/UL
LYMPHOCYTES NFR BLD: 20.9 %
MAGNESIUM SERPL-MCNC: 2.4 MG/DL
MCH RBC QN AUTO: 31.8 PG
MCHC RBC AUTO-ENTMCNC: 33.6 G/DL
MCV RBC AUTO: 94 FL
MONOCYTES # BLD AUTO: 0.8 K/UL
MONOCYTES NFR BLD: 14.1 %
NEUTROPHILS # BLD AUTO: 3.3 K/UL
NEUTROPHILS NFR BLD: 59.5 %
NRBC BLD-RTO: 0 /100 WBC
PHOSPHATE SERPL-MCNC: 3.3 MG/DL
PLATELET # BLD AUTO: 185 K/UL
PMV BLD AUTO: 10.4 FL
POTASSIUM SERPL-SCNC: 3.6 MMOL/L
PROT SERPL-MCNC: 5.6 G/DL
RBC # BLD AUTO: 2.33 M/UL
SODIUM SERPL-SCNC: 133 MMOL/L
VANCOMYCIN SERPL-MCNC: 7.5 UG/ML
WBC # BLD AUTO: 5.54 K/UL

## 2018-01-27 PROCEDURE — 80158 DRUG ASSAY CYCLOSPORINE: CPT

## 2018-01-27 PROCEDURE — 20600001 HC STEP DOWN PRIVATE ROOM

## 2018-01-27 PROCEDURE — 80053 COMPREHEN METABOLIC PANEL: CPT

## 2018-01-27 PROCEDURE — 90935 HEMODIALYSIS ONE EVALUATION: CPT

## 2018-01-27 PROCEDURE — 96372 THER/PROPH/DIAG INJ SC/IM: CPT

## 2018-01-27 PROCEDURE — 63600175 PHARM REV CODE 636 W HCPCS: Performed by: PHYSICIAN ASSISTANT

## 2018-01-27 PROCEDURE — 85025 COMPLETE CBC W/AUTO DIFF WBC: CPT

## 2018-01-27 PROCEDURE — 25000003 PHARM REV CODE 250: Performed by: INTERNAL MEDICINE

## 2018-01-27 PROCEDURE — 63600175 PHARM REV CODE 636 W HCPCS: Performed by: NURSE PRACTITIONER

## 2018-01-27 PROCEDURE — 63600175 PHARM REV CODE 636 W HCPCS: Mod: JG | Performed by: SURGERY

## 2018-01-27 PROCEDURE — 25000003 PHARM REV CODE 250: Performed by: NURSE PRACTITIONER

## 2018-01-27 PROCEDURE — 80202 ASSAY OF VANCOMYCIN: CPT

## 2018-01-27 PROCEDURE — 84100 ASSAY OF PHOSPHORUS: CPT

## 2018-01-27 PROCEDURE — 99233 SBSQ HOSP IP/OBS HIGH 50: CPT | Mod: ,,, | Performed by: NURSE PRACTITIONER

## 2018-01-27 PROCEDURE — 63600175 PHARM REV CODE 636 W HCPCS: Performed by: HOSPITALIST

## 2018-01-27 PROCEDURE — 83735 ASSAY OF MAGNESIUM: CPT

## 2018-01-27 PROCEDURE — 25000003 PHARM REV CODE 250: Performed by: STUDENT IN AN ORGANIZED HEALTH CARE EDUCATION/TRAINING PROGRAM

## 2018-01-27 PROCEDURE — 25000003 PHARM REV CODE 250: Performed by: PHYSICIAN ASSISTANT

## 2018-01-27 PROCEDURE — 90935 HEMODIALYSIS ONE EVALUATION: CPT | Mod: ,,, | Performed by: INTERNAL MEDICINE

## 2018-01-27 RX ORDER — SODIUM CHLORIDE 9 MG/ML
INJECTION, SOLUTION INTRAVENOUS ONCE
Status: COMPLETED | OUTPATIENT
Start: 2018-01-27 | End: 2018-01-27

## 2018-01-27 RX ORDER — BISACODYL 10 MG
10 SUPPOSITORY, RECTAL RECTAL DAILY PRN
Status: DISCONTINUED | OUTPATIENT
Start: 2018-01-27 | End: 2018-03-25

## 2018-01-27 RX ADMIN — ATOVAQUONE 1500 MG: 750 SUSPENSION ORAL at 12:01

## 2018-01-27 RX ADMIN — OXYCODONE HYDROCHLORIDE 5 MG: 5 TABLET ORAL at 01:01

## 2018-01-27 RX ADMIN — BISACODYL 10 MG: 10 SUPPOSITORY RECTAL at 09:01

## 2018-01-27 RX ADMIN — ERYTHROPOIETIN 10000 UNITS: 10000 INJECTION, SOLUTION INTRAVENOUS; SUBCUTANEOUS at 11:01

## 2018-01-27 RX ADMIN — OXYCODONE HYDROCHLORIDE 5 MG: 5 TABLET ORAL at 10:01

## 2018-01-27 RX ADMIN — LEVETIRACETAM 500 MG: 500 TABLET ORAL at 01:01

## 2018-01-27 RX ADMIN — OXYCODONE HYDROCHLORIDE 5 MG: 5 TABLET ORAL at 06:01

## 2018-01-27 RX ADMIN — CYCLOSPORINE 150 MG: 25 CAPSULE, LIQUID FILLED ORAL at 05:01

## 2018-01-27 RX ADMIN — SODIUM CHLORIDE: 0.9 INJECTION, SOLUTION INTRAVENOUS at 08:01

## 2018-01-27 RX ADMIN — METOCLOPRAMIDE HYDROCHLORIDE 5 MG: 5 TABLET ORAL at 05:01

## 2018-01-27 RX ADMIN — LEVETIRACETAM 500 MG: 500 TABLET ORAL at 09:01

## 2018-01-27 RX ADMIN — URSODIOL 300 MG: 300 CAPSULE ORAL at 01:01

## 2018-01-27 RX ADMIN — LEVOTHYROXINE SODIUM 75 MCG: 75 TABLET ORAL at 05:01

## 2018-01-27 RX ADMIN — OXYCODONE HYDROCHLORIDE 5 MG: 5 TABLET ORAL at 05:01

## 2018-01-27 RX ADMIN — ONDANSETRON HYDROCHLORIDE 4 MG: 2 INJECTION, SOLUTION INTRAMUSCULAR; INTRAVENOUS at 08:01

## 2018-01-27 RX ADMIN — OMEPRAZOLE 40 MG: 40 CAPSULE, DELAYED RELEASE ORAL at 04:01

## 2018-01-27 RX ADMIN — OXYCODONE HYDROCHLORIDE 5 MG: 5 TABLET ORAL at 02:01

## 2018-01-27 RX ADMIN — HEPARIN SODIUM 5000 UNITS: 5000 INJECTION, SOLUTION INTRAVENOUS; SUBCUTANEOUS at 09:01

## 2018-01-27 RX ADMIN — HEPARIN SODIUM 1000 UNITS: 1000 INJECTION, SOLUTION INTRAVENOUS; SUBCUTANEOUS at 11:01

## 2018-01-27 RX ADMIN — CYCLOSPORINE 150 MG: 100 CAPSULE, LIQUID FILLED ORAL at 05:01

## 2018-01-27 NOTE — ASSESSMENT & PLAN NOTE
- Poor PO intake since transplant requiring short course of TPN during previous hospital stay.   - albumin remains decreased but appetite slowly improving per pt.   - Dietary consulted. Will need to closely monitor PO intake.   - supplements also ordered.   - SHANE with tube feeding start 1/16. Stopped 1/17/18, was not tolerating. Shane removed 1/20.  - prealbumin 7 on 1/15.  - TPN started 1/16/18.   - Pt able to eat minimally 1/21 and 1/22 which is an improvement.  - gi consulted - EGD pending, increased ppi bid, miralax bid, and metamucil bid, decreased narcotics  - continue reglan, placed SHANE tube with tube feeds  - plan for small bowel follow through Monday, possible peg next week.

## 2018-01-27 NOTE — PROGRESS NOTES
[facility]  Liver Transplant  Progress Note    Patient Name: Jhonny Diana  MRN: [id number]  Admission Date: 2018  Hospital Length of Stay: 16 days  Code Status: Full Code  Primary Care Provider: Primary Doctor No  Post-Operative Day: 100    ORGAN:   LIVER  Disease Etiology: Acute Alcoholic Hepatitis  Donor Type:    - Brain Death  CDC High Risk:   No  Donor CMV Status:   Donor CMV Status: Positive  Donor HBcAB:   Negative  Donor HCV Status:   Negative  Whole or Partial: Whole Liver  Biliary Anastomosis: End to End  Arterial Anatomy: Standard  Subjective:     History of Present Illness:  Jhonny Diana is a 28 y/o male with past medical history of alcoholic cirrhosis.  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 Found to have peritonitis in November (WBC 5000, 75% PNM) neg for bile leak. He was treated initially with vanc/cefepime. Repeat cell counts  with some improvement (WBC 1400, 45% PNM). He has undergone multiple paracenteses as well as abscess drainage of perihepatic fluid collections and treated with antimicrobial therapy but no positive cultures. Of note, biliary stricture also identified and ERCP performed on 2017 with sphincterotomy and biliary stent placed. Liver tests still have not normalized despite intervention, bilirubin and AP remain elevated. Other pertinent PMH current wound vac in place 2/2 wound infection, malnutrition requiring TPN for short course and ongoing hypoalbuminemia, and seizure activity while on prograf and has since been switched to cyclosporine without reoccurrence.  He presented to the ER for fever, abdominal pain, and N/V. He reports fever (103) for 1  day prior. Overnight, he developed N/V, reports small amount of green emesis with new left sided pain. He also endorses worsening SOB with exertion. He was scheduled as an outpatient for follow up paracentesis and IR drainage of fluid collection. CXR in ER shows large pleural effusion with subsegmental atelectasis. Infectious work up initiated in ER. His ANC is 900. Broad spectrum antibiotics initiated in ED. He denies chest pain, palpitations, diarrhea, constipation, or back pain. Denies any sick contacts.    Hospital Course:  Thoracentesis (1.2L off), Paracentesis (1.6L off), and IR drainage of fluid collection 1/11, all fluids negative for infection. ID consulted. Broad spectrum antibiotics d/c'd 1/15. Chronically malnourished with poor PO intake, prealbumin 7. Shane tube placed 1/15 for tube feedings.     ERCP 1/17 with sludge and a biliary stone which was removed and stent exchanged.  Remained with n/v; therefore, TF remained on hold and TPN continued.   Pt with fever s/p ERCP on 1/17 which continued until 1/19.  Vanc/cefepime restarted.  Blood cx 1/17 and 1/18 NGTD.  ID reconsulted.  Fungal markers sent.  CT C/A/P obtained.  With large R pleural effusion and ascites- both drained 1/19 and negative for infx per cell count. Pt afebrile 1/20. Liver biopsy 1/23 - without rejection.  EGD 1/25 - unremarkable for source of GI symptoms.  Resume diet along with other GI recommendations.     Interval History: No acute events over night.  GI consulted EGD completed & reviewed.  Will plan for small bowel follow through on Monday.  Placed a SHANE tube for tube feedings and tolerating well.  Will discontinue TPN.  Continue reglan to aid with peristalsis.  Encouraged ambulation multiple times daily. Monitor.     Scheduled Meds:   atovaquone  1,500 mg Oral Daily    cycloSPORINE modified  150 mg Oral BID    docusate sodium  100 mg Oral TID PC    epoetin maurisio (PROCRIT) injection  10,000 Units Intravenous Every Tues, Thurs, Sat     heparin (porcine)  5,000 Units Subcutaneous Q12H    levETIRAcetam  500 mg Oral BID    levothyroxine  75 mcg Oral Before breakfast    metoclopramide HCl  5 mg Oral QID (AC & HS)    omeprazole  40 mg Oral BID AC    polyethylene glycol  17 g Oral BID    psyllium husk (aspartame)  3.4 g Oral BID    ursodiol  300 mg Oral BID     Continuous Infusions:   TPN ADULT CENTRAL LINE CUSTOM 40 mL/hr at 01/26/18 2115     PRN Meds:heparin (porcine), ondansetron, ondansetron, oxyCODONE, prochlorperazine, simethicone, sodium chloride 0.9%    Review of Systems   Constitutional: Positive for activity change, appetite change and fatigue. Negative for chills and fever.   HENT: Negative.  Negative for congestion and facial swelling.    Eyes: Negative for pain, discharge and visual disturbance.   Respiratory: Negative for cough, shortness of breath and wheezing.    Cardiovascular: Positive for leg swelling. Negative for chest pain.   Gastrointestinal: Positive for abdominal distention, abdominal pain and constipation. Negative for nausea and vomiting.   Genitourinary: Positive for decreased urine volume. Negative for difficulty urinating.        Anuric   Musculoskeletal: Negative for arthralgias, back pain and myalgias.   Skin: Positive for wound. Negative for color change and rash.   Allergic/Immunologic: Positive for immunocompromised state.   Neurological: Positive for weakness. Negative for dizziness, seizures and headaches.   Psychiatric/Behavioral: Positive for decreased concentration and dysphoric mood. Negative for confusion. The patient is not nervous/anxious.    All other systems reviewed and are negative.    Objective:     Vital Signs (Most Recent):  Temp: 98.5 °F (36.9 °C) (01/27/18 0812)  Pulse: 85 (01/27/18 1115)  Resp: 16 (01/27/18 0812)  BP: 126/68 (01/27/18 1115)  SpO2: 97 % (01/27/18 0736) Vital Signs (24h Range):  Temp:  [98.2 °F (36.8 °C)-99.1 °F (37.3 °C)] 98.5 °F (36.9 °C)  Pulse:  [77-96] 85  Resp:   [16-20] 16  SpO2:  [95 %-98 %] 97 %  BP: (118-143)/(68-78) 126/68     Weight: 70.5 kg (155 lb 6.8 oz)  Body mass index is 25.09 kg/m².    Intake/Output - Last 3 Shifts       01/25 0700 - 01/26 0659 01/26 0700 - 01/27 0659 01/27 0700 - 01/28 0659    P.O. 260 1280     I.V. (mL/kg) 350 (5)      Other 650      NG/GT  230     IV Piggyback       TPN 1822 1052.4     Total Intake(mL/kg) 3082 (44) 2562.4 (36.3)     Urine (mL/kg/hr) 0 (0) 50 (0)     Other 3650 (2.2)      Stool 0 (0) 0 (0)     Total Output 3650 50      Net -568.1 +2512.4             Urine Occurrence 0 x      Stool Occurrence 0 x 0 x           Physical Exam   Constitutional: He is oriented to person, place, and time. He appears well-developed. No distress.   Temporal and distal extremity muscle wasting   HENT:   Head: Normocephalic and atraumatic.   Mouth/Throat: No oropharyngeal exudate.   dylan tube   Eyes: EOM are normal. Pupils are equal, round, and reactive to light. Scleral icterus is present.   Neck: Normal range of motion. Neck supple. No JVD present. No thyromegaly present.   Cardiovascular: Normal rate, regular rhythm, normal heart sounds and intact distal pulses.    No murmur heard.  Pulmonary/Chest: Effort normal. No respiratory distress. He has decreased breath sounds in the right middle field, the right lower field and the left lower field. He has no wheezes. He exhibits no tenderness.   Diminished to RLL   Abdominal: Soft. Bowel sounds are normal. He exhibits ascites. He exhibits no distension. There is tenderness (mild). There is no rebound and no guarding.   Dressing to chevron.  Wd vac removed 1/12/18  Dependent edema present R flank; surrounding erythema   Musculoskeletal: Normal range of motion. He exhibits edema (2+ LE edema). He exhibits no tenderness.   Neurological: He is alert and oriented to person, place, and time. He has normal reflexes.   Skin: Skin is warm and dry. Capillary refill takes 2 to 3 seconds. He is not diaphoretic. No  erythema.   jaundice   Psychiatric: He has a normal mood and affect. His behavior is normal. Judgment and thought content normal. His mood appears not anxious.   Depressed mood   Nursing note and vitals reviewed.      Laboratory:  Immunosuppressants         Stop Route Frequency     cycloSPORINE modified capsule 150 mg      -- Oral 2 times daily        CBC:     Recent Labs  Lab 01/27/18  0515   WBC 5.54   RBC 2.33*   HGB 7.4*   HCT 22.0*      MCV 94   MCH 31.8*   MCHC 33.6     CMP:     Recent Labs  Lab 01/27/18  0515      CALCIUM 9.4   ALBUMIN 2.0*   PROT 5.6*   *   K 3.6   CO2 25   CL 99   BUN 24*   CREATININE 3.9*   ALKPHOS 168*   ALT 5*   AST 25   BILITOT 2.8*     Labs within the past 24 hours have been reviewed.    Diagnostic Results:  I have personally reviewed all pertinent imaging studies.    Assessment/Plan:     Severe protein-calorie malnutrition    - Poor PO intake since transplant requiring short course of TPN during previous hospital stay.   - albumin remains decreased but appetite slowly improving per pt.   - Dietary consulted. Will need to closely monitor PO intake.   - supplements also ordered.   - SHANE with tube feeding start 1/16. Stopped 1/17/18, was not tolerating. Shane removed 1/20.  - prealbumin 7 on 1/15.  - TPN started 1/16/18.   - Pt able to eat minimally 1/21 and 1/22 which is an improvement.  - gi consulted - EGD pending, increased ppi bid, miralax bid, and metamucil bid, decreased narcotics  - continue reglan, placed SHANE tube with tube feeds  - plan for small bowel follow through Monday, possible peg next week.        Constipation    - Enema ordered 1/20 and 1/21 with BM  - Encourage ambulation.  - decrease narcotics  - metamucil and miralax bid            Acute renal failure with tubular necrosis    - HD resumed on previous admission. Now anuric and dialyzes M-W-F.  - Nephrology following.    - HD Sat since held Friday for thora/para.  - HD transitioned to TTS  schedule.  - ktm consulted for possible kidney transplant        Delayed surgical wound healing    - wd vac removed 1/12/18.  Wound healing well. Aquacel AG and Mepilex border dressing applied via wound care recs.           Liver transplanted    - Post op course complicated by fevers and hyperbilirubinemia.  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  - AST/ALT normal. Tbili/alkphos remain elevated.  - Liver US 1/8 showed 3.9 cm complex fluid collection anterior to right lobe and moderate nonspecific complex ascites inferior to transplant.   - IR placed drain 1/11/18, cell count negative for infection.  - PBS consulted. PBS not comfortable proceeding with EUS with liver biopsy given fever and possible infectious process going on. ERCP 1/17 with stone and sludge. Pt placed on Actigall - treated with 10 day course of abx.  ID now signed off.  - Bili remains elevated.  - Liver biopsy 1/23 without rejection.        Long-term use of immunosuppressant medication    - Maintenance IS with cyclosporine. MMF on hold for infections and neutropenia. Continue to check cyclosporine level daily. Assess for toxicity and adjust level as needed.        Prophylactic immunotherapy    - See long term use of immunosuppression.         At risk for opportunistic infections    - Hold Valcyte as WBC low.  CMV PCR 1/11, 1/18 undetected.  - bactrim held.          Anemia of chronic disease    - PRBC x 1 1/19  - PRBC x 1 1/21  - Continue daily CBC.  - H&H trending down but stable.  - consider blood transfusion with next HD.        Other ascites    - Paracentesis performed 1/11/18 with 1600 ml removed.    - fluid negative for infection.   - Repeat para 1/19 negative for infection per cell count  - no fluid seen for paracentesis on 1/23.        Nausea and vomiting    - h/o constipation, reports having regular BM's with bowel regimen  - vomiting episode x 1 at home. Anti-emetics ordered PRN.  - KUB repeated 1/14/18 given  increased abdominal pain- mild, generalized bowel distention suggesting ileus.  Diet changed to clears as tolerated for bowel rest   - SHANE tube placed 1/16 for tube feeds. Not tolerating tube feeds, stopped 1/17/18.  - D/C shane 1/19  - chronic constipation, Cont bowel regimen.   - GI consulted.              VTE Risk Mitigation         Ordered     heparin (porcine) injection 1,000 Units  As needed (PRN)     Route:  Intra-Catheter        01/12/18 1017     heparin (porcine) injection 5,000 Units  Every 12 hours     Route:  Subcutaneous        01/12/18 0926     Medium Risk of VTE  Once      01/11/18 0351     Place sequential compression device  Until discontinued      01/11/18 0351          The patients clinical status was discussed at multidisplinary rounds, involving transplant surgery, transplant medicine, pharmacy, nursing, nutrition, and social work    Discharge Planning: not candidate at this time  Monitor kidney function --> may need outpt HD  Monitor HH needs vs rehab for deconditioned status      Seda Camacho NP  Liver Transplant  [facility]

## 2018-01-27 NOTE — PLAN OF CARE
Problem: Patient Care Overview  Goal: Plan of Care Review  Outcome: Ongoing (interventions implemented as appropriate)  3.5 hour dialysis complete.  Blood rinsed back.  RIJ permcath flushed with normal saline, both lumens filled with heparin, capped and taped.  Net UF 3L.  Tolerated well.  Procrit given.

## 2018-01-27 NOTE — PLAN OF CARE
Problem: Patient Care Overview  Goal: Plan of Care Review  Outcome: Ongoing (interventions implemented as appropriate)  Pt aao x4. Call bell within reach. Mother at bedside. He had HD today. He is up independent. He has reach his goal on his TF at 45. tpn turned down to 20cc per hour. Will continue to monitor.

## 2018-01-27 NOTE — SUBJECTIVE & OBJECTIVE
Scheduled Meds:   atovaquone  1,500 mg Oral Daily    cycloSPORINE modified  150 mg Oral BID    docusate sodium  100 mg Oral TID PC    epoetin maurisio (PROCRIT) injection  10,000 Units Intravenous Every Tues, Thurs, Sat    heparin (porcine)  5,000 Units Subcutaneous Q12H    levETIRAcetam  500 mg Oral BID    levothyroxine  75 mcg Oral Before breakfast    metoclopramide HCl  5 mg Oral QID (AC & HS)    omeprazole  40 mg Oral BID AC    polyethylene glycol  17 g Oral BID    psyllium husk (aspartame)  3.4 g Oral BID    ursodiol  300 mg Oral BID     Continuous Infusions:   TPN ADULT CENTRAL LINE CUSTOM 40 mL/hr at 01/26/18 2115     PRN Meds:heparin (porcine), ondansetron, ondansetron, oxyCODONE, prochlorperazine, simethicone, sodium chloride 0.9%    Review of Systems   Constitutional: Positive for activity change, appetite change and fatigue. Negative for chills and fever.   HENT: Negative.  Negative for congestion and facial swelling.    Eyes: Negative for pain, discharge and visual disturbance.   Respiratory: Negative for cough, shortness of breath and wheezing.    Cardiovascular: Positive for leg swelling. Negative for chest pain.   Gastrointestinal: Positive for abdominal distention, abdominal pain and constipation. Negative for nausea and vomiting.   Genitourinary: Positive for decreased urine volume. Negative for difficulty urinating.        Anuric   Musculoskeletal: Negative for arthralgias, back pain and myalgias.   Skin: Positive for wound. Negative for color change and rash.   Allergic/Immunologic: Positive for immunocompromised state.   Neurological: Positive for weakness. Negative for dizziness, seizures and headaches.   Psychiatric/Behavioral: Positive for decreased concentration and dysphoric mood. Negative for confusion. The patient is not nervous/anxious.    All other systems reviewed and are negative.    Objective:     Vital Signs (Most Recent):  Temp: 98.5 °F (36.9 °C) (01/27/18 0812)  Pulse: 85  (01/27/18 1115)  Resp: 16 (01/27/18 0812)  BP: 126/68 (01/27/18 1115)  SpO2: 97 % (01/27/18 0736) Vital Signs (24h Range):  Temp:  [98.2 °F (36.8 °C)-99.1 °F (37.3 °C)] 98.5 °F (36.9 °C)  Pulse:  [77-96] 85  Resp:  [16-20] 16  SpO2:  [95 %-98 %] 97 %  BP: (118-143)/(68-78) 126/68     Weight: 70.5 kg (155 lb 6.8 oz)  Body mass index is 25.09 kg/m².    Intake/Output - Last 3 Shifts       01/25 0700 - 01/26 0659 01/26 0700 - 01/27 0659 01/27 0700 - 01/28 0659    P.O. 260 1280     I.V. (mL/kg) 350 (5)      Other 650      NG/GT  230     IV Piggyback       TPN 1822 1052.4     Total Intake(mL/kg) 3082 (44) 2562.4 (36.3)     Urine (mL/kg/hr) 0 (0) 50 (0)     Other 3650 (2.2)      Stool 0 (0) 0 (0)     Total Output 3650 50      Net -568.1 +2512.4             Urine Occurrence 0 x      Stool Occurrence 0 x 0 x           Physical Exam   Constitutional: He is oriented to person, place, and time. He appears well-developed. No distress.   Temporal and distal extremity muscle wasting   HENT:   Head: Normocephalic and atraumatic.   Mouth/Throat: No oropharyngeal exudate.   dylan tube   Eyes: EOM are normal. Pupils are equal, round, and reactive to light. Scleral icterus is present.   Neck: Normal range of motion. Neck supple. No JVD present. No thyromegaly present.   Cardiovascular: Normal rate, regular rhythm, normal heart sounds and intact distal pulses.    No murmur heard.  Pulmonary/Chest: Effort normal. No respiratory distress. He has decreased breath sounds in the right middle field, the right lower field and the left lower field. He has no wheezes. He exhibits no tenderness.   Diminished to RLL   Abdominal: Soft. Bowel sounds are normal. He exhibits ascites. He exhibits no distension. There is tenderness (mild). There is no rebound and no guarding.   Dressing to chevron.  Wd vac removed 1/12/18  Dependent edema present R flank; surrounding erythema   Musculoskeletal: Normal range of motion. He exhibits edema (2+ LE edema). He  exhibits no tenderness.   Neurological: He is alert and oriented to person, place, and time. He has normal reflexes.   Skin: Skin is warm and dry. Capillary refill takes 2 to 3 seconds. He is not diaphoretic. No erythema.   jaundice   Psychiatric: He has a normal mood and affect. His behavior is normal. Judgment and thought content normal. His mood appears not anxious.   Depressed mood   Nursing note and vitals reviewed.      Laboratory:  Immunosuppressants         Stop Route Frequency     cycloSPORINE modified capsule 150 mg      -- Oral 2 times daily        CBC:     Recent Labs  Lab 01/27/18  0515   WBC 5.54   RBC 2.33*   HGB 7.4*   HCT 22.0*      MCV 94   MCH 31.8*   MCHC 33.6     CMP:     Recent Labs  Lab 01/27/18  0515      CALCIUM 9.4   ALBUMIN 2.0*   PROT 5.6*   *   K 3.6   CO2 25   CL 99   BUN 24*   CREATININE 3.9*   ALKPHOS 168*   ALT 5*   AST 25   BILITOT 2.8*     Labs within the past 24 hours have been reviewed.    Diagnostic Results:  I have personally reviewed all pertinent imaging studies.

## 2018-01-27 NOTE — PLAN OF CARE
Problem: Patient Care Overview  Goal: Plan of Care Review  Outcome: Ongoing (interventions implemented as appropriate)  * AAO x 4  * Abdomen distended and soft.  * Regular diet patient reported no appetite at this time. See chart for % eaten.   * Edema noted to the bilateral lower extremities.  * Bed at lowest position and locked, call light within reach, non-slip socks on, mother at bedside, and side rails up x 2.  Encouraged patient to call for assistance when needed this RN visualized patient ambulating in room and jenkins gait and balance WNL.  * Chevron incision with foam dressing clean dry and intact per order dressing to be changed every Tuesday and Friday. Site clean dry and intact no signs or symptoms of infection noted.   * LIJ TLC (1/16/18) patent, dressing clean dry and intact no signs or symptoms of infection noted.  * Hemodialysis on Tuesday, Thursday and Saturday. Patient tolerating well see chart for HD totals.   * Right chest wall (10/30/17)) patent, dressing clean dry and intact no signs or symptoms of infection noted.  * Jaundice noted the skin and sclera.  * Patient has complaints of nausea/vomiting PRN medication administered full relief noted.  * Oxygen saturation 99 % on room air.  Respirations even and unlabored no signs or symptoms of distress noted.  * Patient has complaints of pain to the abdomen PRN pain medication administered. moderate relief noted.  * Skin assessment preformed no breakdown noted.  * Standard precautions maintained.  * Right nare SHANE (1/26/18) placement verified per xray marked at 74 cm. Tube feedings per MD order: Novosourse initiated at 10 cc/hr, with a goad of 40 cc/hr to be increase every 4 hours. Patient tolerating well residuals not able to be drawn back LEEANNE Ryan NP notified. No nausea noted rate increased to 20 cc/gr.  * Patient able to turn self no assistance needed.

## 2018-01-28 LAB
ALBUMIN SERPL BCP-MCNC: 2.1 G/DL
ALP SERPL-CCNC: 206 U/L
ALT SERPL W/O P-5'-P-CCNC: 6 U/L
ANION GAP SERPL CALC-SCNC: 9 MMOL/L
AST SERPL-CCNC: 29 U/L
BASOPHILS # BLD AUTO: 0.02 K/UL
BASOPHILS NFR BLD: 0.4 %
BILIRUB SERPL-MCNC: 3.2 MG/DL
BUN SERPL-MCNC: 16 MG/DL
CALCIUM SERPL-MCNC: 9.4 MG/DL
CHLORIDE SERPL-SCNC: 101 MMOL/L
CO2 SERPL-SCNC: 27 MMOL/L
CREAT SERPL-MCNC: 2.9 MG/DL
CYCLOSPORINE BLD LC/MS/MS-MCNC: 325 NG/ML
DIFFERENTIAL METHOD: ABNORMAL
EOSINOPHIL # BLD AUTO: 0.2 K/UL
EOSINOPHIL NFR BLD: 3.9 %
ERYTHROCYTE [DISTWIDTH] IN BLOOD BY AUTOMATED COUNT: 15.9 %
EST. GFR  (AFRICAN AMERICAN): 32.5 ML/MIN/1.73 M^2
EST. GFR  (NON AFRICAN AMERICAN): 28.1 ML/MIN/1.73 M^2
GLUCOSE SERPL-MCNC: 90 MG/DL
HCT VFR BLD AUTO: 21.8 %
HGB BLD-MCNC: 7.3 G/DL
IMM GRANULOCYTES # BLD AUTO: 0.04 K/UL
IMM GRANULOCYTES NFR BLD AUTO: 0.7 %
LYMPHOCYTES # BLD AUTO: 1.2 K/UL
LYMPHOCYTES NFR BLD: 21.6 %
MAGNESIUM SERPL-MCNC: 2.2 MG/DL
MCH RBC QN AUTO: 31.3 PG
MCHC RBC AUTO-ENTMCNC: 33.5 G/DL
MCV RBC AUTO: 94 FL
MONOCYTES # BLD AUTO: 0.9 K/UL
MONOCYTES NFR BLD: 16.3 %
NEUTROPHILS # BLD AUTO: 3.2 K/UL
NEUTROPHILS NFR BLD: 57.1 %
NRBC BLD-RTO: 0 /100 WBC
PHOSPHATE SERPL-MCNC: 2.2 MG/DL
PLATELET # BLD AUTO: 185 K/UL
PMV BLD AUTO: 10.6 FL
POTASSIUM SERPL-SCNC: 3.6 MMOL/L
PROT SERPL-MCNC: 5.6 G/DL
RBC # BLD AUTO: 2.33 M/UL
SODIUM SERPL-SCNC: 137 MMOL/L
WBC # BLD AUTO: 5.6 K/UL

## 2018-01-28 PROCEDURE — 84100 ASSAY OF PHOSPHORUS: CPT

## 2018-01-28 PROCEDURE — 20600001 HC STEP DOWN PRIVATE ROOM

## 2018-01-28 PROCEDURE — 25000003 PHARM REV CODE 250: Performed by: NURSE PRACTITIONER

## 2018-01-28 PROCEDURE — 25000003 PHARM REV CODE 250: Performed by: STUDENT IN AN ORGANIZED HEALTH CARE EDUCATION/TRAINING PROGRAM

## 2018-01-28 PROCEDURE — 85025 COMPLETE CBC W/AUTO DIFF WBC: CPT

## 2018-01-28 PROCEDURE — 80053 COMPREHEN METABOLIC PANEL: CPT

## 2018-01-28 PROCEDURE — 99233 SBSQ HOSP IP/OBS HIGH 50: CPT | Mod: ,,, | Performed by: NURSE PRACTITIONER

## 2018-01-28 PROCEDURE — 63600175 PHARM REV CODE 636 W HCPCS: Performed by: NURSE PRACTITIONER

## 2018-01-28 PROCEDURE — 63600175 PHARM REV CODE 636 W HCPCS: Performed by: PHYSICIAN ASSISTANT

## 2018-01-28 PROCEDURE — 25000003 PHARM REV CODE 250: Performed by: PHYSICIAN ASSISTANT

## 2018-01-28 PROCEDURE — 83735 ASSAY OF MAGNESIUM: CPT

## 2018-01-28 PROCEDURE — 80158 DRUG ASSAY CYCLOSPORINE: CPT

## 2018-01-28 PROCEDURE — 87040 BLOOD CULTURE FOR BACTERIA: CPT

## 2018-01-28 PROCEDURE — 36415 COLL VENOUS BLD VENIPUNCTURE: CPT

## 2018-01-28 RX ORDER — PSEUDOEPHEDRINE/ACETAMINOPHEN 30MG-500MG
100 TABLET ORAL
Status: DISPENSED | OUTPATIENT
Start: 2018-01-28 | End: 2018-01-28

## 2018-01-28 RX ORDER — SYRING-NEEDL,DISP,INSUL,0.3 ML 29 G X1/2"
296 SYRINGE, EMPTY DISPOSABLE MISCELLANEOUS
Status: DISPENSED | OUTPATIENT
Start: 2018-01-28 | End: 2018-01-28

## 2018-01-28 RX ORDER — BISACODYL 10 MG
10 SUPPOSITORY, RECTAL RECTAL ONCE
Status: COMPLETED | OUTPATIENT
Start: 2018-01-28 | End: 2018-01-28

## 2018-01-28 RX ADMIN — HEPARIN SODIUM 5000 UNITS: 5000 INJECTION, SOLUTION INTRAVENOUS; SUBCUTANEOUS at 09:01

## 2018-01-28 RX ADMIN — URSODIOL 300 MG: 300 CAPSULE ORAL at 08:01

## 2018-01-28 RX ADMIN — PSYLLIUM HUSK 1 PACKET: 3.4 POWDER ORAL at 08:01

## 2018-01-28 RX ADMIN — METOCLOPRAMIDE HYDROCHLORIDE 5 MG: 5 TABLET ORAL at 08:01

## 2018-01-28 RX ADMIN — POLYETHYLENE GLYCOL 3350 17 G: 17 POWDER, FOR SOLUTION ORAL at 08:01

## 2018-01-28 RX ADMIN — OMEPRAZOLE 40 MG: 40 CAPSULE, DELAYED RELEASE ORAL at 06:01

## 2018-01-28 RX ADMIN — METOCLOPRAMIDE HYDROCHLORIDE 5 MG: 5 TABLET ORAL at 11:01

## 2018-01-28 RX ADMIN — DOCUSATE SODIUM 100 MG: 100 CAPSULE, LIQUID FILLED ORAL at 08:01

## 2018-01-28 RX ADMIN — LEVETIRACETAM 500 MG: 500 TABLET ORAL at 08:01

## 2018-01-28 RX ADMIN — CYCLOSPORINE 125 MG: 100 CAPSULE, LIQUID FILLED ORAL at 06:01

## 2018-01-28 RX ADMIN — OMEPRAZOLE 40 MG: 40 CAPSULE, DELAYED RELEASE ORAL at 04:01

## 2018-01-28 RX ADMIN — HEPARIN SODIUM 5000 UNITS: 5000 INJECTION, SOLUTION INTRAVENOUS; SUBCUTANEOUS at 08:01

## 2018-01-28 RX ADMIN — BISACODYL 10 MG: 10 SUPPOSITORY RECTAL at 06:01

## 2018-01-28 RX ADMIN — CYCLOSPORINE 150 MG: 25 CAPSULE, LIQUID FILLED ORAL at 08:01

## 2018-01-28 RX ADMIN — METOCLOPRAMIDE HYDROCHLORIDE 5 MG: 5 TABLET ORAL at 04:01

## 2018-01-28 RX ADMIN — DOCUSATE SODIUM 100 MG: 100 CAPSULE, LIQUID FILLED ORAL at 02:01

## 2018-01-28 RX ADMIN — ONDANSETRON HYDROCHLORIDE 4 MG: 2 INJECTION, SOLUTION INTRAMUSCULAR; INTRAVENOUS at 09:01

## 2018-01-28 RX ADMIN — METOCLOPRAMIDE HYDROCHLORIDE 5 MG: 5 TABLET ORAL at 06:01

## 2018-01-28 RX ADMIN — BISACODYL 10 MG: 10 SUPPOSITORY RECTAL at 10:01

## 2018-01-28 RX ADMIN — LEVOTHYROXINE SODIUM 75 MCG: 75 TABLET ORAL at 04:01

## 2018-01-28 RX ADMIN — ATOVAQUONE 1500 MG: 750 SUSPENSION ORAL at 08:01

## 2018-01-28 NOTE — NURSING
"Pt told that even though he had a bowel movement Dr. López would still like him to have the previously ordered enema. Pt stated, "Well they can shove that up their asses".  "

## 2018-01-28 NOTE — PLAN OF CARE
"Problem: Patient Care Overview  Goal: Plan of Care Review  Outcome: Ongoing (interventions implemented as appropriate)  Pt aao x3. Vss. No acute distress. Mother and friend at bedside. Pt received suppository with results. Pt still ordered to receive enema. Pt refused. Pt encouraged per nurse to ambulate often to facilitate more regular bowel movements. Pt argumentative and misdirecting and manipulative. Pt blaming doctors for current condition, constipation, and ordering the wrong meds for bowels.  Pt stated, "I never had problems going prior to coming here". Abdominal xray shown to pt and explained. Pt stated, "i'm not even eating. All you're seeing is the miralax and metamucil y'all keep giving me that's doing nothing". After strong encouragement pt ambulated in hallway. Pt still complaining of nausea. Pt refusing restarting tube feeding due to nausea. Will retry enema this afternoon. See assessment for full chart details. Will continue to monitor, assess and adjust care as needed.      "

## 2018-01-28 NOTE — ASSESSMENT & PLAN NOTE
- Enema ordered 1/20 and 1/21 with BM  - Encourage ambulation.  - decrease narcotics  - metamucil and miralax bid, brown bomb enema 1/28

## 2018-01-28 NOTE — SUBJECTIVE & OBJECTIVE
Scheduled Meds:   atovaquone  1,500 mg Oral Daily    cycloSPORINE modified  150 mg Oral BID    docusate sodium  100 mg Oral TID PC    epoetin maurisio (PROCRIT) injection  10,000 Units Intravenous Every Tues, Thurs, Sat    glycerin 99.5%  100 mL Rectal ED 1 Time    And    magnesium citrate  296 mL Rectal ED 1 Time    And    sodium chloride 0.9%  500 mL Rectal ED 1 Time    heparin (porcine)  5,000 Units Subcutaneous Q12H    levETIRAcetam  500 mg Oral BID    levothyroxine  75 mcg Oral Before breakfast    metoclopramide HCl  5 mg Oral QID (AC & HS)    omeprazole  40 mg Oral BID AC    polyethylene glycol  17 g Oral BID    psyllium husk (aspartame)  3.4 g Oral BID    ursodiol  300 mg Oral BID     Continuous Infusions:    PRN Meds:bisacodyl, heparin (porcine), ondansetron, ondansetron, oxyCODONE, prochlorperazine, simethicone, sodium chloride 0.9%    Review of Systems   Constitutional: Positive for activity change, appetite change and fatigue. Negative for chills and fever.   HENT: Negative.  Negative for congestion and facial swelling.    Eyes: Negative for pain, discharge and visual disturbance.   Respiratory: Negative for cough, shortness of breath and wheezing.    Cardiovascular: Positive for leg swelling. Negative for chest pain.   Gastrointestinal: Positive for abdominal distention, abdominal pain, constipation, nausea and vomiting.   Genitourinary: Positive for decreased urine volume. Negative for difficulty urinating.        Anuric   Musculoskeletal: Negative for arthralgias, back pain and myalgias.   Skin: Positive for wound. Negative for color change and rash.   Allergic/Immunologic: Positive for immunocompromised state.   Neurological: Negative for dizziness, seizures, weakness and headaches.   Psychiatric/Behavioral: Positive for decreased concentration and dysphoric mood. Negative for confusion. The patient is not nervous/anxious.    All other systems reviewed and are negative.    Objective:      Vital Signs (Most Recent):  Temp: 99 °F (37.2 °C) (01/28/18 0758)  Pulse: 93 (01/28/18 0758)  Resp: 18 (01/28/18 0758)  BP: 118/71 (01/28/18 0758)  SpO2: 96 % (01/28/18 0758) Vital Signs (24h Range):  Temp:  [98.4 °F (36.9 °C)-100.4 °F (38 °C)] 99 °F (37.2 °C)  Pulse:  [81-96] 93  Resp:  [16-20] 18  SpO2:  [95 %-97 %] 96 %  BP: (107-130)/(57-73) 118/71     Weight: 67.8 kg (149 lb 7.6 oz)  Body mass index is 24.13 kg/m².    Intake/Output - Last 3 Shifts       01/26 0700 - 01/27 0659 01/27 0700 - 01/28 0659 01/28 0700 - 01/29 0659    P.O. 1280 720     I.V. (mL/kg)       Other  650     NG/ 270     TPN 1052.4 351.6     Total Intake(mL/kg) 2562.4 (36.3) 1991.6 (29.4)     Urine (mL/kg/hr) 50 (0) 0 (0)     Other  3650 (2.2)     Stool 0 (0) 0 (0)     Total Output 50 3650      Net +2512.4 -1658.4             Urine Occurrence  0 x     Stool Occurrence 0 x 0 x 0 x          Physical Exam   Constitutional: He is oriented to person, place, and time. He appears well-developed. No distress.   Temporal and distal extremity muscle wasting   HENT:   Head: Normocephalic and atraumatic.   Mouth/Throat: No oropharyngeal exudate.   dylan tube   Eyes: EOM are normal. Pupils are equal, round, and reactive to light. Scleral icterus is present.   Neck: Normal range of motion. Neck supple. No JVD present. No thyromegaly present.   Cardiovascular: Normal rate, regular rhythm, normal heart sounds and intact distal pulses.    No murmur heard.  Pulmonary/Chest: Effort normal. No respiratory distress. He has decreased breath sounds in the right middle field, the right lower field and the left lower field. He has no wheezes. He exhibits no tenderness.   Diminished to RLL   Abdominal: Soft. Bowel sounds are normal. He exhibits ascites. He exhibits no distension. There is tenderness (mild). There is no rebound and no guarding.   Dressing to chevron.  Wd vac removed 1/12/18  Dependent edema present R flank; surrounding erythema    Musculoskeletal: Normal range of motion. He exhibits edema (2+ LE edema). He exhibits no tenderness.   Neurological: He is alert and oriented to person, place, and time. He has normal reflexes.   Skin: Skin is warm and dry. Capillary refill takes 2 to 3 seconds. He is not diaphoretic. No erythema.   jaundice   Psychiatric: He has a normal mood and affect. His behavior is normal. Judgment and thought content normal. His mood appears not anxious.   Depressed mood   Nursing note and vitals reviewed.      Laboratory:  Immunosuppressants         Stop Route Frequency     cycloSPORINE modified (NEORAL) capsule 150 mg      -- Oral 2 times daily        CBC:     Recent Labs  Lab 01/28/18  0430   WBC 5.60   RBC 2.33*   HGB 7.3*   HCT 21.8*      MCV 94   MCH 31.3*   MCHC 33.5     CMP:     Recent Labs  Lab 01/28/18  0430   GLU 90   CALCIUM 9.4   ALBUMIN 2.1*   PROT 5.6*      K 3.6   CO2 27      BUN 16   CREATININE 2.9*   ALKPHOS 206*   ALT 6*   AST 29   BILITOT 3.2*     Labs within the past 24 hours have been reviewed.    Diagnostic Results:  I have personally reviewed all pertinent imaging studies.

## 2018-01-28 NOTE — ASSESSMENT & PLAN NOTE
- Post op course complicated by fevers and hyperbilirubinemia.  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  - AST/ALT normal. Tbili/alkphos remain elevated.  - Liver US 1/8 showed 3.9 cm complex fluid collection anterior to right lobe and moderate nonspecific complex ascites inferior to transplant.   - IR placed drain 1/11/18, cell count negative for infection.  - PBS consulted. PBS not comfortable proceeding with EUS with liver biopsy given fever and possible infectious process going on. ERCP 1/17 with stone and sludge. Pt placed on Actigall - treated with 10 day course of abx.  ID now signed off.  - Bili remains elevated - liver u/s 1/28  - Liver biopsy 1/23 without rejection.

## 2018-01-28 NOTE — PROGRESS NOTES
Transplant Surgery Note    Informed of patient's increasing abdominal pain and constipation. No BM since last Sunday. Tube feeds increased today. Patient with vomiting and unable to tolerate PO medications due to nausea. Vitals stable, NAD.    - Hold tube feeds  - Suppository  - Follow up DA Swain MD, PGY-2  General Surgery  [phone]

## 2018-01-28 NOTE — PROGRESS NOTES
[facility]  Liver Transplant  Progress Note    Patient Name: Jhonny Diana  MRN: [id number]  Admission Date: 2018  Hospital Length of Stay: 17 days  Code Status: Full Code  Primary Care Provider: Primary Doctor No  Post-Operative Day: 101    ORGAN:   LIVER  Disease Etiology: Acute Alcoholic Hepatitis  Donor Type:    - Brain Death  CDC High Risk:   No  Donor CMV Status:   Donor CMV Status: Positive  Donor HBcAB:   Negative  Donor HCV Status:   Negative  Whole or Partial: Whole Liver  Biliary Anastomosis: End to End  Arterial Anatomy: Standard  Subjective:     History of Present Illness:  Jhonny Diana is a 28 y/o male with past medical history of alcoholic cirrhosis.  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 Found to have peritonitis in November (WBC 5000, 75% PNM) neg for bile leak. He was treated initially with vanc/cefepime. Repeat cell counts  with some improvement (WBC 1400, 45% PNM). He has undergone multiple paracenteses as well as abscess drainage of perihepatic fluid collections and treated with antimicrobial therapy but no positive cultures. Of note, biliary stricture also identified and ERCP performed on 2017 with sphincterotomy and biliary stent placed. Liver tests still have not normalized despite intervention, bilirubin and AP remain elevated. Other pertinent PMH current wound vac in place 2/2 wound infection, malnutrition requiring TPN for short course and ongoing hypoalbuminemia, and seizure activity while on prograf and has since been switched to cyclosporine without reoccurrence.  He presented to the ER for fever, abdominal pain, and N/V. He reports fever (103) for 1  day prior. Overnight, he developed N/V, reports small amount of green emesis with new left sided pain. He also endorses worsening SOB with exertion. He was scheduled as an outpatient for follow up paracentesis and IR drainage of fluid collection. CXR in ER shows large pleural effusion with subsegmental atelectasis. Infectious work up initiated in ER. His ANC is 900. Broad spectrum antibiotics initiated in ED. He denies chest pain, palpitations, diarrhea, constipation, or back pain. Denies any sick contacts.    Hospital Course:  Thoracentesis (1.2L off), Paracentesis (1.6L off), and IR drainage of fluid collection 1/11, all fluids negative for infection. ID consulted. Broad spectrum antibiotics d/c'd 1/15. Chronically malnourished with poor PO intake, prealbumin 7. Dylan tube placed 1/15 for tube feedings.     ERCP 1/17 with sludge and a biliary stone which was removed and stent exchanged.  Remained with n/v; therefore, TF remained on hold and TPN continued.   Pt with fever s/p ERCP on 1/17 which continued until 1/19.  Vanc/cefepime restarted.  Blood cx 1/17 and 1/18 NGTD.  ID reconsulted.  Fungal markers sent.  CT C/A/P obtained.  With large R pleural effusion and ascites- both drained 1/19 and negative for infx per cell count. Pt afebrile 1/20. Liver biopsy 1/23 - without rejection.  EGD 1/25 - unremarkable for source of GI symptoms.  Resume diet along with other GI recommendations.     Interval History:  GI assessed patient over night due to n/v.  KUB obtained with Fecal loading throughout the colon suggestive of constipation.  Tube feeds were stopped and dylan tube remains in place.  Will plan for small bowel follow through on Monday.  Reviewing medication history noted not to be taking stool softeners, metamucil, or miralax as ordered.  Educated patient on the importance of these medications.  Patient and mother state understanding.  Continue reglan to aid with peristalsis.  Encouraged ambulation multiple times  daily. Monitor.   Bili noted to be elevated on am labs.  Obtain liver ultrasound to assess biliary stent and bile ducts.    Scheduled Meds:   atovaquone  1,500 mg Oral Daily    cycloSPORINE modified  150 mg Oral BID    docusate sodium  100 mg Oral TID PC    epoetin maurisio (PROCRIT) injection  10,000 Units Intravenous Every Tues, Thurs, Sat    glycerin 99.5%  100 mL Rectal ED 1 Time    And    magnesium citrate  296 mL Rectal ED 1 Time    And    sodium chloride 0.9%  500 mL Rectal ED 1 Time    heparin (porcine)  5,000 Units Subcutaneous Q12H    levETIRAcetam  500 mg Oral BID    levothyroxine  75 mcg Oral Before breakfast    metoclopramide HCl  5 mg Oral QID (AC & HS)    omeprazole  40 mg Oral BID AC    polyethylene glycol  17 g Oral BID    psyllium husk (aspartame)  3.4 g Oral BID    ursodiol  300 mg Oral BID     Continuous Infusions:    PRN Meds:bisacodyl, heparin (porcine), ondansetron, ondansetron, oxyCODONE, prochlorperazine, simethicone, sodium chloride 0.9%    Review of Systems   Constitutional: Positive for activity change, appetite change and fatigue. Negative for chills and fever.   HENT: Negative.  Negative for congestion and facial swelling.    Eyes: Negative for pain, discharge and visual disturbance.   Respiratory: Negative for cough, shortness of breath and wheezing.    Cardiovascular: Positive for leg swelling. Negative for chest pain.   Gastrointestinal: Positive for abdominal distention, abdominal pain, constipation, nausea and vomiting.   Genitourinary: Positive for decreased urine volume. Negative for difficulty urinating.        Anuric   Musculoskeletal: Negative for arthralgias, back pain and myalgias.   Skin: Positive for wound. Negative for color change and rash.   Allergic/Immunologic: Positive for immunocompromised state.   Neurological: Negative for dizziness, seizures, weakness and headaches.   Psychiatric/Behavioral: Positive for decreased concentration and dysphoric mood.  Negative for confusion. The patient is not nervous/anxious.    All other systems reviewed and are negative.    Objective:     Vital Signs (Most Recent):  Temp: 99 °F (37.2 °C) (01/28/18 0758)  Pulse: 93 (01/28/18 0758)  Resp: 18 (01/28/18 0758)  BP: 118/71 (01/28/18 0758)  SpO2: 96 % (01/28/18 0758) Vital Signs (24h Range):  Temp:  [98.4 °F (36.9 °C)-100.4 °F (38 °C)] 99 °F (37.2 °C)  Pulse:  [81-96] 93  Resp:  [16-20] 18  SpO2:  [95 %-97 %] 96 %  BP: (107-130)/(57-73) 118/71     Weight: 67.8 kg (149 lb 7.6 oz)  Body mass index is 24.13 kg/m².    Intake/Output - Last 3 Shifts       01/26 0700 - 01/27 0659 01/27 0700 - 01/28 0659 01/28 0700 - 01/29 0659    P.O. 1280 720     I.V. (mL/kg)       Other  650     NG/ 270     TPN 1052.4 351.6     Total Intake(mL/kg) 2562.4 (36.3) 1991.6 (29.4)     Urine (mL/kg/hr) 50 (0) 0 (0)     Other  3650 (2.2)     Stool 0 (0) 0 (0)     Total Output 50 3650      Net +2512.4 -1658.4             Urine Occurrence  0 x     Stool Occurrence 0 x 0 x 0 x          Physical Exam   Constitutional: He is oriented to person, place, and time. He appears well-developed. No distress.   Temporal and distal extremity muscle wasting   HENT:   Head: Normocephalic and atraumatic.   Mouth/Throat: No oropharyngeal exudate.   dylan tube   Eyes: EOM are normal. Pupils are equal, round, and reactive to light. Scleral icterus is present.   Neck: Normal range of motion. Neck supple. No JVD present. No thyromegaly present.   Cardiovascular: Normal rate, regular rhythm, normal heart sounds and intact distal pulses.    No murmur heard.  Pulmonary/Chest: Effort normal. No respiratory distress. He has decreased breath sounds in the right middle field, the right lower field and the left lower field. He has no wheezes. He exhibits no tenderness.   Diminished to RLL   Abdominal: Soft. Bowel sounds are normal. He exhibits ascites. He exhibits no distension. There is tenderness (mild). There is no rebound and no  guarding.   Dressing to chevron.  Wd vac removed 1/12/18  Dependent edema present R flank; surrounding erythema   Musculoskeletal: Normal range of motion. He exhibits edema (2+ LE edema). He exhibits no tenderness.   Neurological: He is alert and oriented to person, place, and time. He has normal reflexes.   Skin: Skin is warm and dry. Capillary refill takes 2 to 3 seconds. He is not diaphoretic. No erythema.   jaundice   Psychiatric: He has a normal mood and affect. His behavior is normal. Judgment and thought content normal. His mood appears not anxious.   Depressed mood   Nursing note and vitals reviewed.      Laboratory:  Immunosuppressants         Stop Route Frequency     cycloSPORINE modified (NEORAL) capsule 150 mg      -- Oral 2 times daily        CBC:     Recent Labs  Lab 01/28/18  0430   WBC 5.60   RBC 2.33*   HGB 7.3*   HCT 21.8*      MCV 94   MCH 31.3*   MCHC 33.5     CMP:     Recent Labs  Lab 01/28/18  0430   GLU 90   CALCIUM 9.4   ALBUMIN 2.1*   PROT 5.6*      K 3.6   CO2 27      BUN 16   CREATININE 2.9*   ALKPHOS 206*   ALT 6*   AST 29   BILITOT 3.2*     Labs within the past 24 hours have been reviewed.    Diagnostic Results:  I have personally reviewed all pertinent imaging studies.    Assessment/Plan:     Severe protein-calorie malnutrition    - Poor PO intake since transplant requiring short course of TPN during previous hospital stay.   - albumin remains decreased but appetite slowly improving per pt.   - Dietary consulted. Will need to closely monitor PO intake.   - supplements also ordered.   - SHANE with tube feeding start 1/16. Stopped 1/17/18, was not tolerating. Shane removed 1/20.  - prealbumin 7 on 1/15.  - TPN started 1/16/18.   - Pt able to eat minimally 1/21 and 1/22 which is an improvement.  - gi consulted - EGD pending, increased ppi bid, miralax bid, and metamucil bid, decreased narcotics  - continue reglan, placed SHANE tube with tube feeds  - plan for small bowel  follow through Monday, possible peg next week.        Constipation    - Enema ordered 1/20 and 1/21 with BM  - Encourage ambulation.  - decrease narcotics  - metamucil and miralax bid, brown bomb enema 1/28            Acute renal failure with tubular necrosis    - HD resumed on previous admission. Now anuric and dialyzes M-W-F.  - Nephrology following.    - HD Sat since held Friday for thora/para.  - HD transitioned to TTS schedule.  - ktm consulted for possible kidney transplant        Delayed surgical wound healing    - wd vac removed 1/12/18.  Wound healing well. Aquacel AG and Mepilex border dressing applied via wound care recs.           Liver transplanted    - Post op course complicated by fevers and hyperbilirubinemia.  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  - AST/ALT normal. Tbili/alkphos remain elevated.  - Liver US 1/8 showed 3.9 cm complex fluid collection anterior to right lobe and moderate nonspecific complex ascites inferior to transplant.   - IR placed drain 1/11/18, cell count negative for infection.  - PBS consulted. PBS not comfortable proceeding with EUS with liver biopsy given fever and possible infectious process going on. ERCP 1/17 with stone and sludge. Pt placed on Actigall - treated with 10 day course of abx.  ID now signed off.  - Bili remains elevated - liver u/s 1/28  - Liver biopsy 1/23 without rejection.        Long-term use of immunosuppressant medication    - Maintenance IS with cyclosporine. MMF on hold for infections and neutropenia. Continue to check cyclosporine level daily. Assess for toxicity and adjust level as needed.        Prophylactic immunotherapy    - See long term use of immunosuppression.         At risk for opportunistic infections    - Hold Valcyte as WBC low.  CMV PCR 1/11, 1/18 undetected.  - bactrim held.          Anemia of chronic disease    - PRBC x 1 1/19  - PRBC x 1 1/21  - Continue daily CBC.  - H&H trending down but stable.  -  consider blood transfusion with next HD.        Other ascites    - Paracentesis performed 1/11/18 with 1600 ml removed.    - fluid negative for infection.   - Repeat para 1/19 negative for infection per cell count  - no fluid seen for paracentesis on 1/23.        Nausea and vomiting    - h/o constipation, reports having regular BM's with bowel regimen  - vomiting episode x 1 at home. Anti-emetics ordered PRN.  - KUB repeated 1/14/18 given increased abdominal pain- mild, generalized bowel distention suggesting ileus.  Diet changed to clears as tolerated for bowel rest   - SHANE tube placed 1/16 for tube feeds. Not tolerating tube feeds, stopped 1/17/18.  - D/C shane 1/19  - chronic constipation, Cont bowel regimen.   - GI consulted.              VTE Risk Mitigation         Ordered     heparin (porcine) injection 1,000 Units  As needed (PRN)     Route:  Intra-Catheter        01/12/18 1017     heparin (porcine) injection 5,000 Units  Every 12 hours     Route:  Subcutaneous        01/12/18 0926     Medium Risk of VTE  Once      01/11/18 0351     Place sequential compression device  Until discontinued      01/11/18 0351          The patients clinical status was discussed at multidisplinary rounds, involving transplant surgery, transplant medicine, pharmacy, nursing, nutrition, and social work    Discharge Planning: not candidate at this time  Monitor kidney function --> may need outpt HD  Monitor HH needs vs rehab for deconditioned status      Seda Camacho, NP  Liver Transplant  [facility]

## 2018-01-28 NOTE — TREATMENT PLAN
Treatment Plan  01/27/2018  10:57 PM    Reviewing chart and aware of nausea/emesis reporting at ~ 8pm.   This is a change from how he was this afternoon. I visited with patient this afternoon. TF were infusion and he has minimal nausea with no emesis. He complained of some abdominal discomfort but his abdomen was soft and not-distended. I informed him that after the UGI we would give further recs to help his motility. With new events will see how patient is doing in the AM to see if we can further aid in management.    Divya Booker M.D.  Gastroenterology Fellow, PGY-IV  Pager: [phone]  [facility]

## 2018-01-28 NOTE — NURSING
Discussed with Dr Swain concerns regarding absence of bowel movement since Sunday 1/21/18 and continuous tube feedings and ATC pain medications. Per Dr Swain order received for Doculax suppository. MD is in agreement with this RN's plan to hold pain medication due to constipation and abdominal pain. Notified patient and mother of plan and educated both on reasons for constipation. Both in agreement with plan.

## 2018-01-28 NOTE — PLAN OF CARE
Problem: Patient Care Overview  Goal: Plan of Care Review  Outcome: Ongoing (interventions implemented as appropriate)  * AAO x 4  * Abdomen distended and soft.  * Regular diet patient reported no appetite at this time. See chart for % eaten.   * Edema noted to the bilateral lower extremities.  * Bed at lowest position and locked, call light within reach, non-slip socks on, mother at bedside, and side rails up x 2.  Encouraged patient to call for assistance when needed this RN visualized patient ambulating in room and jenkins gait and balance WNL.  * Chevron incision with foam dressing clean dry and intact per order dressing to be changed every Tuesday and Friday. Site clean dry and intact no signs or symptoms of infection noted.   * LIJ TLC (1/16/18) patent, dressing clean dry and intact no signs or symptoms of infection noted.  * Hemodialysis on Tuesday, Thursday and Saturday. Patient tolerating well see chart for HD totals.   * Right chest wall (10/30/17) permacath, dressing clean dry and intact no signs or symptoms of infection noted.  * Jaundice noted the skin and sclera.  * Patient has complaints of nausea/vomiting PRN medication administered full relief noted. 200 cc of emesis noted.  * Oxygen saturation 99-96 % on room air.  Respirations even and unlabored no signs or symptoms of distress noted.  * Patient has complaints of pain to the abdomen PRN pain not administered due to constipation and last bowel movement on 1/21/18. Patient and mother aware of plan and are in agreement.   * Skin assessment preformed no breakdown noted.  * Standard precautions maintained.  * Right nare SHANE (1/26/18) placement verified per xray marked at 74 cm. Tube feedings per MD order: Novosourse at 45 cc/hr, with a goad of 45cc/hr. Tube feedings held per MD order due to constipation, nausea, and vomiting.  * Patient able to turn self no assistance needed

## 2018-01-28 NOTE — NURSING
Notified Dr Swain of nausea and vomiting 250 cc emesis noted, tube feedings held. Cool cloth placed on forehead by mother and IV Zofran administered. Notified Dr Swain of nausea/vomiting Per MD continue to hold tube feedings overnight, an order for KUB placed. MD to bedside to assess patient. Patient is in agreement with plan from MD for the administration of the suppository and await bowel movement.

## 2018-01-29 LAB
ABO + RH BLD: NORMAL
ALBUMIN SERPL BCP-MCNC: 2.1 G/DL
ALP SERPL-CCNC: 186 U/L
ALT SERPL W/O P-5'-P-CCNC: 8 U/L
ANION GAP SERPL CALC-SCNC: 11 MMOL/L
AST SERPL-CCNC: 29 U/L
BASOPHILS # BLD AUTO: 0.03 K/UL
BASOPHILS NFR BLD: 0.5 %
BILIRUB SERPL-MCNC: 4.4 MG/DL
BLD GP AB SCN CELLS X3 SERPL QL: NORMAL
BUN SERPL-MCNC: 24 MG/DL
CALCIUM SERPL-MCNC: 10.5 MG/DL
CHLORIDE SERPL-SCNC: 100 MMOL/L
CO2 SERPL-SCNC: 25 MMOL/L
CREAT SERPL-MCNC: 4.3 MG/DL
CYCLOSPORINE BLD LC/MS/MS-MCNC: 217 NG/ML
DIFFERENTIAL METHOD: ABNORMAL
EOSINOPHIL # BLD AUTO: 0.3 K/UL
EOSINOPHIL NFR BLD: 5.4 %
ERYTHROCYTE [DISTWIDTH] IN BLOOD BY AUTOMATED COUNT: 15.8 %
EST. GFR  (AFRICAN AMERICAN): 20.2 ML/MIN/1.73 M^2
EST. GFR  (NON AFRICAN AMERICAN): 17.5 ML/MIN/1.73 M^2
GLUCOSE SERPL-MCNC: 64 MG/DL
HCT VFR BLD AUTO: 21.2 %
HCT VFR BLD AUTO: 21.5 %
HGB BLD-MCNC: 7 G/DL
HGB BLD-MCNC: 7.1 G/DL
IMM GRANULOCYTES # BLD AUTO: 0.04 K/UL
IMM GRANULOCYTES NFR BLD AUTO: 0.7 %
LYMPHOCYTES # BLD AUTO: 1.3 K/UL
LYMPHOCYTES NFR BLD: 22.6 %
MAGNESIUM SERPL-MCNC: 2.3 MG/DL
MCH RBC QN AUTO: 30.9 PG
MCHC RBC AUTO-ENTMCNC: 33 G/DL
MCV RBC AUTO: 94 FL
MONOCYTES # BLD AUTO: 0.9 K/UL
MONOCYTES NFR BLD: 15.2 %
NEUTROPHILS # BLD AUTO: 3.2 K/UL
NEUTROPHILS NFR BLD: 55.6 %
NRBC BLD-RTO: 0 /100 WBC
PHOSPHATE SERPL-MCNC: 3.6 MG/DL
PLATELET # BLD AUTO: 184 K/UL
PMV BLD AUTO: 10.5 FL
POTASSIUM SERPL-SCNC: 4 MMOL/L
PREALB SERPL-MCNC: 14 MG/DL
PROT SERPL-MCNC: 5.8 G/DL
RBC # BLD AUTO: 2.3 M/UL
SODIUM SERPL-SCNC: 136 MMOL/L
WBC # BLD AUTO: 5.74 K/UL

## 2018-01-29 PROCEDURE — 25000003 PHARM REV CODE 250: Performed by: NURSE PRACTITIONER

## 2018-01-29 PROCEDURE — 25500020 PHARM REV CODE 255: Performed by: STUDENT IN AN ORGANIZED HEALTH CARE EDUCATION/TRAINING PROGRAM

## 2018-01-29 PROCEDURE — 80053 COMPREHEN METABOLIC PANEL: CPT

## 2018-01-29 PROCEDURE — 86850 RBC ANTIBODY SCREEN: CPT

## 2018-01-29 PROCEDURE — 90935 HEMODIALYSIS ONE EVALUATION: CPT

## 2018-01-29 PROCEDURE — 25000003 PHARM REV CODE 250: Performed by: PHYSICIAN ASSISTANT

## 2018-01-29 PROCEDURE — 80158 DRUG ASSAY CYCLOSPORINE: CPT

## 2018-01-29 PROCEDURE — 84100 ASSAY OF PHOSPHORUS: CPT

## 2018-01-29 PROCEDURE — 63600175 PHARM REV CODE 636 W HCPCS: Performed by: NURSE PRACTITIONER

## 2018-01-29 PROCEDURE — 86920 COMPATIBILITY TEST SPIN: CPT

## 2018-01-29 PROCEDURE — 85018 HEMOGLOBIN: CPT

## 2018-01-29 PROCEDURE — 85025 COMPLETE CBC W/AUTO DIFF WBC: CPT

## 2018-01-29 PROCEDURE — 83735 ASSAY OF MAGNESIUM: CPT

## 2018-01-29 PROCEDURE — 85014 HEMATOCRIT: CPT

## 2018-01-29 PROCEDURE — 97803 MED NUTRITION INDIV SUBSEQ: CPT | Performed by: DIETITIAN, REGISTERED

## 2018-01-29 PROCEDURE — 25000003 PHARM REV CODE 250: Performed by: STUDENT IN AN ORGANIZED HEALTH CARE EDUCATION/TRAINING PROGRAM

## 2018-01-29 PROCEDURE — 90935 HEMODIALYSIS ONE EVALUATION: CPT | Mod: ,,, | Performed by: NURSE PRACTITIONER

## 2018-01-29 PROCEDURE — 20600001 HC STEP DOWN PRIVATE ROOM

## 2018-01-29 PROCEDURE — 63600175 PHARM REV CODE 636 W HCPCS: Performed by: HOSPITALIST

## 2018-01-29 PROCEDURE — 84134 ASSAY OF PREALBUMIN: CPT

## 2018-01-29 PROCEDURE — 25500020 PHARM REV CODE 255: Performed by: SURGERY

## 2018-01-29 PROCEDURE — 99233 SBSQ HOSP IP/OBS HIGH 50: CPT | Mod: ,,, | Performed by: NURSE PRACTITIONER

## 2018-01-29 RX ORDER — DOCUSATE SODIUM 50 MG/5ML
100 LIQUID ORAL 2 TIMES DAILY
Status: DISCONTINUED | OUTPATIENT
Start: 2018-01-29 | End: 2018-01-30

## 2018-01-29 RX ORDER — SODIUM CHLORIDE 9 MG/ML
INJECTION, SOLUTION INTRAVENOUS ONCE
Status: COMPLETED | OUTPATIENT
Start: 2018-01-29 | End: 2018-01-29

## 2018-01-29 RX ORDER — TRAMADOL HYDROCHLORIDE 50 MG/1
50 TABLET ORAL ONCE
Status: COMPLETED | OUTPATIENT
Start: 2018-01-29 | End: 2018-01-29

## 2018-01-29 RX ADMIN — OMEPRAZOLE 40 MG: 40 CAPSULE, DELAYED RELEASE ORAL at 06:01

## 2018-01-29 RX ADMIN — OMEPRAZOLE 40 MG: 40 CAPSULE, DELAYED RELEASE ORAL at 04:01

## 2018-01-29 RX ADMIN — LEVETIRACETAM 500 MG: 500 TABLET ORAL at 09:01

## 2018-01-29 RX ADMIN — URSODIOL 300 MG: 300 CAPSULE ORAL at 09:01

## 2018-01-29 RX ADMIN — DOCUSATE SODIUM 100 MG: 100 CAPSULE, LIQUID FILLED ORAL at 10:01

## 2018-01-29 RX ADMIN — ATOVAQUONE 1500 MG: 750 SUSPENSION ORAL at 09:01

## 2018-01-29 RX ADMIN — IOHEXOL 75 ML: 350 INJECTION, SOLUTION INTRAVENOUS at 12:01

## 2018-01-29 RX ADMIN — OXYCODONE HYDROCHLORIDE 5 MG: 5 TABLET ORAL at 07:01

## 2018-01-29 RX ADMIN — OXYCODONE HYDROCHLORIDE 5 MG: 5 TABLET ORAL at 09:01

## 2018-01-29 RX ADMIN — HEPARIN SODIUM 5000 UNITS: 5000 INJECTION, SOLUTION INTRAVENOUS; SUBCUTANEOUS at 09:01

## 2018-01-29 RX ADMIN — IOHEXOL 15 ML: 350 INJECTION, SOLUTION INTRAVENOUS at 10:01

## 2018-01-29 RX ADMIN — OXYCODONE HYDROCHLORIDE 5 MG: 5 TABLET ORAL at 05:01

## 2018-01-29 RX ADMIN — HEPARIN SODIUM 1000 UNITS: 1000 INJECTION, SOLUTION INTRAVENOUS; SUBCUTANEOUS at 05:01

## 2018-01-29 RX ADMIN — POLYETHYLENE GLYCOL 3350 17 G: 17 POWDER, FOR SOLUTION ORAL at 09:01

## 2018-01-29 RX ADMIN — METOCLOPRAMIDE HYDROCHLORIDE 5 MG: 5 TABLET ORAL at 09:01

## 2018-01-29 RX ADMIN — TRAMADOL HYDROCHLORIDE 50 MG: 50 TABLET, FILM COATED ORAL at 01:01

## 2018-01-29 RX ADMIN — CYCLOSPORINE 125 MG: 100 CAPSULE, LIQUID FILLED ORAL at 06:01

## 2018-01-29 RX ADMIN — DOCUSATE SODIUM 100 MG: 50 LIQUID ORAL at 09:01

## 2018-01-29 RX ADMIN — METOCLOPRAMIDE HYDROCHLORIDE 5 MG: 5 TABLET ORAL at 05:01

## 2018-01-29 RX ADMIN — LEVOTHYROXINE SODIUM 75 MCG: 75 TABLET ORAL at 04:01

## 2018-01-29 RX ADMIN — ONDANSETRON HYDROCHLORIDE 4 MG: 2 INJECTION, SOLUTION INTRAMUSCULAR; INTRAVENOUS at 09:01

## 2018-01-29 RX ADMIN — SODIUM CHLORIDE: 0.9 INJECTION, SOLUTION INTRAVENOUS at 02:01

## 2018-01-29 RX ADMIN — METOCLOPRAMIDE HYDROCHLORIDE 5 MG: 5 TABLET ORAL at 04:01

## 2018-01-29 RX ADMIN — CYCLOSPORINE 125 MG: 100 CAPSULE, LIQUID FILLED ORAL at 08:01

## 2018-01-29 NOTE — ASSESSMENT & PLAN NOTE
- HD resumed on previous admission. Now anuric and dialyzes M-W-F.  - Nephrology following.    - HD Sat since held Friday for thora/para.  - HD transitioned to TTS schedule.  - ktm consulted for possible kidney transplant - will need to be worked up outpt per protocol

## 2018-01-29 NOTE — ASSESSMENT & PLAN NOTE
Nutrition Diagnosis  Inadequate energy intake    Related to (etiology):   Decreased appetite, abdominal distention, NPO with no nutrition support at this time    Signs and Symptoms (as evidenced by):   Pt meeting ~25% of needs at most via oral intake, hollowing temples, protruding clavicle, visible scapula.     Interventions/Recommendations (treatment strategy):  See recs    Nutrition Diagnosis Status:   Continues

## 2018-01-29 NOTE — ASSESSMENT & PLAN NOTE
- Post op course complicated by fevers and hyperbilirubinemia.  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  - AST/ALT normal. Tbili/alkphos remain elevated.  - Liver US 1/8 showed 3.9 cm complex fluid collection anterior to right lobe and moderate nonspecific complex ascites inferior to transplant.   - IR placed drain 1/11/18, cell count negative for infection.  - PBS consulted. PBS not comfortable proceeding with EUS with liver biopsy given fever and possible infectious process going on. ERCP 1/17 with stone and sludge. Pt placed on Actigall - treated with 10 day course of abx.  ID now signed off.  - Bili remains elevated - liver u/s 1/28 - consult PBS for ERCP  - Liver biopsy 1/23 without rejection.

## 2018-01-29 NOTE — PLAN OF CARE
Problem: Patient Care Overview  Goal: Plan of Care Review  Outcome: Ongoing (interventions implemented as appropriate)  * AAO x 4  * Abdomen distended and soft.  * Regular diet patient reported no appetite at this time. See chart for % eaten.   * Edema noted to the bilateral lower extremities.  * Bed at lowest position and locked, call light within reach, non-slip socks on, mother at bedside, and side rails up x 2.  Encouraged patient to call for assistance when needed this RN visualized patient stated understanding.  * Chevron incision with foam dressing clean dry and intact per order dressing to be changed every Tuesday and Friday. Site clean dry and intact no signs or symptoms of infection noted.   * LIJ TLC (1/16/18) patent, dressing clean dry and intact no signs or symptoms of infection noted.  * Hemodialysis on Tuesday, Thursday and Saturday. Patient tolerating well see chart for HD totals.   * Right chest wall (10/30/17) permacath, dressing clean dry and intact no signs or symptoms of infection noted.  * Jaundice noted the skin and sclera.  * Patient has complaints of nausea PRN medication administered full relief noted.   * Oxygen saturation 99-96 % on room air.  Respirations even and unlabored no signs or symptoms of distress noted.  * Patient has complaints of pain to the abdomen PRN pain not administered due to constipation and last bowel movement on 1/28/18. Patient and mother aware of plan and are in agreement.   * Skin assessment preformed no breakdown noted.  * Standard precautions maintained.  * Right nare SHANE (1/26/18) placement verified per xray marked at 74 cm. Tube feedings per MD order patient refused  * Patient able to turn self no assistance needed

## 2018-01-29 NOTE — PROGRESS NOTES
[facility]  Liver Transplant  Progress Note    Patient Name: Jhonny Diana  MRN: [id number]  Admission Date: 2018  Hospital Length of Stay: 18 days  Code Status: Full Code  Primary Care Provider: Primary Doctor No  Post-Operative Day: 102    ORGAN:   LIVER  Disease Etiology: Acute Alcoholic Hepatitis  Donor Type:    - Brain Death  CDC High Risk:   No  Donor CMV Status:   Donor CMV Status: Positive  Donor HBcAB:   Negative  Donor HCV Status:   Negative  Whole or Partial: Whole Liver  Biliary Anastomosis: End to End  Arterial Anatomy: Standard  Subjective:     History of Present Illness:  Jhonny Diana is a 28 y/o male with past medical history of alcoholic cirrhosis.  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 Found to have peritonitis in November (WBC 5000, 75% PNM) neg for bile leak. He was treated initially with vanc/cefepime. Repeat cell counts  with some improvement (WBC 1400, 45% PNM). He has undergone multiple paracenteses as well as abscess drainage of perihepatic fluid collections and treated with antimicrobial therapy but no positive cultures. Of note, biliary stricture also identified and ERCP performed on 2017 with sphincterotomy and biliary stent placed. Liver tests still have not normalized despite intervention, bilirubin and AP remain elevated. Other pertinent PMH current wound vac in place 2/2 wound infection, malnutrition requiring TPN for short course and ongoing hypoalbuminemia, and seizure activity while on prograf and has since been switched to cyclosporine without reoccurrence.  He presented to the ER for fever, abdominal pain, and N/V. He reports fever (103) for 1  day prior. Overnight, he developed N/V, reports small amount of green emesis with new left sided pain. He also endorses worsening SOB with exertion. He was scheduled as an outpatient for follow up paracentesis and IR drainage of fluid collection. CXR in ER shows large pleural effusion with subsegmental atelectasis. Infectious work up initiated in ER. His ANC is 900. Broad spectrum antibiotics initiated in ED. He denies chest pain, palpitations, diarrhea, constipation, or back pain. Denies any sick contacts.    Hospital Course:  Thoracentesis (1.2L off), Paracentesis (1.6L off), and IR drainage of fluid collection 1/11, all fluids negative for infection. ID consulted. Broad spectrum antibiotics d/c'd 1/15. Chronically malnourished with poor PO intake, prealbumin 7. Shane tube placed 1/15 for tube feedings.     ERCP 1/17 with sludge and a biliary stone which was removed and stent exchanged.  Remained with n/v; therefore, TF remained on hold and TPN continued.   Pt with fever s/p ERCP on 1/17 which continued until 1/19.  Vanc/cefepime restarted.  Blood cx 1/17 and 1/18 NGTD.  ID reconsulted.  Fungal markers sent.  CT C/A/P obtained.  With large R pleural effusion and ascites- both drained 1/19 and negative for infx per cell count. Pt afebrile 1/20. Liver biopsy 1/23 - without rejection.  EGD 1/25 - unremarkable for source of GI symptoms.  Resume diet along with other GI recommendations. SHANE tube placed for tube feeds.      Interval History:  Continues to complain of nausea/vomiting.  CT scan of chest/abd/pelvis with IV & PO contrast completed and reviewed.  Will need to have thoracentesis at some point in the future.   Plan to consult PBS due to bili elevated on am labs.  NPO after midnight for ERCP in am.    Scheduled Meds:   sodium chloride 0.9%   Intravenous Once    atovaquone  1,500 mg Oral Daily    cycloSPORINE modified  125 mg Oral BID    docusate sodium  100 mg Oral TID PC    epoetin maurisio (PROCRIT) injection   10,000 Units Intravenous Every Tues, Thurs, Sat    heparin (porcine)  5,000 Units Subcutaneous Q12H    levETIRAcetam  500 mg Oral BID    levothyroxine  75 mcg Oral Before breakfast    metoclopramide HCl  5 mg Oral QID (AC & HS)    omeprazole  40 mg Oral BID AC    polyethylene glycol  17 g Oral BID    psyllium husk (aspartame)  3.4 g Oral BID    ursodiol  300 mg Oral BID     Continuous Infusions:    PRN Meds:bisacodyl, heparin (porcine), omnipaque, ondansetron, ondansetron, oxyCODONE, prochlorperazine, simethicone, sodium chloride 0.9%    Review of Systems   Constitutional: Positive for activity change, appetite change and fatigue. Negative for chills and fever.   HENT: Negative.  Negative for congestion and facial swelling.    Eyes: Negative for pain, discharge and visual disturbance.   Respiratory: Negative for cough, shortness of breath and wheezing.    Cardiovascular: Positive for leg swelling. Negative for chest pain.   Gastrointestinal: Positive for abdominal distention, abdominal pain, constipation, nausea and vomiting.   Genitourinary: Positive for decreased urine volume. Negative for difficulty urinating.        Anuric   Musculoskeletal: Negative for arthralgias, back pain and myalgias.   Skin: Positive for wound. Negative for color change and rash.   Allergic/Immunologic: Positive for immunocompromised state.   Neurological: Negative for dizziness, seizures, weakness and headaches.   Psychiatric/Behavioral: Positive for decreased concentration and dysphoric mood. Negative for confusion. The patient is not nervous/anxious.    All other systems reviewed and are negative.    Objective:     Vital Signs (Most Recent):  Temp: 98.2 °F (36.8 °C) (01/29/18 1356)  Pulse: 90 (01/29/18 1500)  Resp: 18 (01/29/18 1356)  BP: 133/84 (01/29/18 1500)  SpO2: 95 % (01/29/18 0730) Vital Signs (24h Range):  Temp:  [98 °F (36.7 °C)-99.3 °F (37.4 °C)] 98.2 °F (36.8 °C)  Pulse:  [] 90  Resp:  [18] 18  SpO2:  [94 %-97  %] 95 %  BP: (122-134)/(70-85) 133/84     Weight: 67.8 kg (149 lb 7.6 oz)  Body mass index is 24.13 kg/m².    Intake/Output - Last 3 Shifts       01/27 0700 - 01/28 0659 01/28 0700 - 01/29 0659 01/29 0700 - 01/30 0659    P.O. 720      Other 650      NG/ 30     .6      Total Intake(mL/kg) 1991.6 (29.4) 30 (0.4)     Urine (mL/kg/hr) 0 (0)      Other 3650 (2.2)      Stool 0 (0)      Total Output 3650        Net -1658.4 +30             Urine Occurrence 0 x      Stool Occurrence 0 x 2 x           Physical Exam   Constitutional: He is oriented to person, place, and time. He appears well-developed. No distress.   Temporal and distal extremity muscle wasting   HENT:   Head: Normocephalic and atraumatic.   Mouth/Throat: No oropharyngeal exudate.   dylan tube   Eyes: EOM are normal. Pupils are equal, round, and reactive to light. Scleral icterus is present.   Neck: Normal range of motion. Neck supple. No JVD present. No thyromegaly present.   Cardiovascular: Normal rate, regular rhythm, normal heart sounds and intact distal pulses.    No murmur heard.  Pulmonary/Chest: Effort normal. No respiratory distress. He has decreased breath sounds in the right middle field, the right lower field and the left lower field. He has no wheezes. He exhibits no tenderness.   Diminished to RLL   Abdominal: Soft. Bowel sounds are normal. He exhibits ascites. He exhibits no distension. There is tenderness (mild). There is no rebound and no guarding.   Dressing to chevron.  Wd vac removed 1/12/18  Dependent edema present R flank; surrounding erythema   Musculoskeletal: Normal range of motion. He exhibits edema (2+ LE edema). He exhibits no tenderness.   Neurological: He is alert and oriented to person, place, and time. He has normal reflexes.   Skin: Skin is warm and dry. Capillary refill takes 2 to 3 seconds. He is not diaphoretic. No erythema.   jaundice   Psychiatric: He has a normal mood and affect. His behavior is normal.  Judgment and thought content normal. His mood appears not anxious.   Depressed mood   Nursing note and vitals reviewed.      Laboratory:  Immunosuppressants         Stop Route Frequency     cycloSPORINE modified capsule 125 mg      -- Oral 2 times daily        CBC:     Recent Labs  Lab 01/29/18  0430 01/29/18  0810   WBC 5.74  --    RBC 2.30*  --    HGB 7.1* 7.0*   HCT 21.5* 21.2*     --    MCV 94  --    MCH 30.9  --    MCHC 33.0  --      CMP:     Recent Labs  Lab 01/29/18  0430   GLU 64*   CALCIUM 10.5   ALBUMIN 2.1*   PROT 5.8*      K 4.0   CO2 25      BUN 24*   CREATININE 4.3*   ALKPHOS 186*   ALT 8*   AST 29   BILITOT 4.4*     Labs within the past 24 hours have been reviewed.    Diagnostic Results:  I have personally reviewed all pertinent imaging studies.    Assessment/Plan:     Severe protein-calorie malnutrition    - Poor PO intake since transplant requiring short course of TPN during previous hospital stay.   - albumin remains decreased but appetite slowly improving per pt.   - Dietary consulted. Will need to closely monitor PO intake.   - supplements also ordered.   - SHANE with tube feeding start 1/16. Stopped 1/17/18, was not tolerating. Shane removed 1/20.  - prealbumin 7 on 1/15.  - TPN started 1/16/18.   - Pt able to eat minimally 1/21 and 1/22 which is an improvement.  - gi consulted - EGD pending, increased ppi bid, miralax bid, and metamucil bid, decreased narcotics  - continue reglan, placed SHANE tube with tube feeds          Constipation    - Enema ordered 1/20 and 1/21 with BM  - Encourage ambulation.  - decrease narcotics  - metamucil and miralax bid, brown bomb enema 1/28            Acute renal failure with tubular necrosis    - HD resumed on previous admission. Now anuric and dialyzes M-W-F.  - Nephrology following.    - HD Sat since held Friday for thora/para.  - HD transitioned to TTS schedule.  - ktm consulted for possible kidney transplant - will need to be worked up outpt  per protocol        Delayed surgical wound healing    - wd vac removed 1/12/18.  Wound healing well. Aquacel AG and Mepilex border dressing applied via wound care recs.           Liver transplanted    - Post op course complicated by fevers and hyperbilirubinemia.  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  - AST/ALT normal. Tbili/alkphos remain elevated.  - Liver US 1/8 showed 3.9 cm complex fluid collection anterior to right lobe and moderate nonspecific complex ascites inferior to transplant.   - IR placed drain 1/11/18, cell count negative for infection.  - PBS consulted. PBS not comfortable proceeding with EUS with liver biopsy given fever and possible infectious process going on. ERCP 1/17 with stone and sludge. Pt placed on Actigall - treated with 10 day course of abx.  ID now signed off.  - Bili remains elevated - liver u/s 1/28 - consult PBS for ERCP  - Liver biopsy 1/23 without rejection.        Long-term use of immunosuppressant medication    - Maintenance IS with cyclosporine. MMF on hold for infections and neutropenia. Continue to check cyclosporine level daily. Assess for toxicity and adjust level as needed.        Prophylactic immunotherapy    - See long term use of immunosuppression.         Biliary stricture of transplanted liver    - bili rising  - consult pbs for ercp in am          At risk for opportunistic infections    - Hold Valcyte as WBC low.  CMV PCR 1/11, 1/18 undetected.  - bactrim held.          Anemia of chronic disease    - PRBC x 1 1/19  - PRBC x 1 1/21  - Continue daily CBC.  - H&H trending down but stable.  - consider blood transfusion with next HD.        Other ascites    - Paracentesis performed 1/11/18 with 1600 ml removed.    - fluid negative for infection.   - Repeat para 1/19 negative for infection per cell count  - no fluid seen for paracentesis on 1/23.        Nausea and vomiting    - h/o constipation, reports having regular BM's with bowel regimen  -  vomiting episode x 1 at home. Anti-emetics ordered PRN.  - KUB repeated 1/14/18 given increased abdominal pain- mild, generalized bowel distention suggesting ileus.  Diet changed to clears as tolerated for bowel rest   - SHANE tube placed 1/16 for tube feeds. Not tolerating tube feeds, stopped 1/17/18.  - D/C shane 1/19  - chronic constipation, Cont bowel regimen.   - GI consulted.              VTE Risk Mitigation         Ordered     heparin (porcine) injection 1,000 Units  As needed (PRN)     Route:  Intra-Catheter        01/12/18 1017     heparin (porcine) injection 5,000 Units  Every 12 hours     Route:  Subcutaneous        01/12/18 0926     Medium Risk of VTE  Once      01/11/18 0351     Place sequential compression device  Until discontinued      01/11/18 0351          The patients clinical status was discussed at multidisplinary rounds, involving transplant surgery, transplant medicine, pharmacy, nursing, nutrition, and social work    Discharge Planning: not candidate at this time  Monitor kidney function --> may need outpt HD  Monitor HH needs vs rehab for deconditioned status      Seda Camacho, NP  Liver Transplant  [facility]

## 2018-01-29 NOTE — TREATMENT PLAN
Treatment Plan  01/28/2018  11:57 PM    Patient seen this morning and KUB from last nigth reviewed.   At this point we recommend stopping Miralax and Metamucil. We recommend a PINKY to make sure patient is not impacted followed by enemas to ensure patient has a bowel movement. Once he begins to have BM than we can restart Miralax.    Initially UGI was scheduled for tomorrow but we can hold off as primrary team has ordered additional imaging.      Divya Booker M.D.  Gastroenterology Fellow, PGY-IV  Pager: [phone]  [facility]

## 2018-01-29 NOTE — PROGRESS NOTES
[facility]  Adult Nutrition  Progress Note    SUMMARY     Recommendations    Recommendation/Intervention: Recommend restarting TF  - Novasource Renal at 45mL/hr. Hold for residuals >500mL. Also recommend PEJ/PEG placement per GI reccs.     If unable to restart TF within 24 hours, recommend restarting TPN until proper EN regimen has been determined. Custom TPN of 90g AA / 364g dex plus IV lipids daily will meet patients's EEN and EPN.     Continue regular diet. RD following.     Goals: Consume/tolerate > 85% EEN and EPN  Nutrition Goal Status: progressing towards goal  Communication of RD Recs: reviewed with physician      Reason for Assessment    Reason for Assessment: RD follow-up  Diagnosis: transplant/postoperative complications  Relevent Medical History: OLTx 10/19/2017, complicate post-op w/ LAURA ongoing, chronic malnutrition, cirrhosis 2' EtOH   Interdisciplinary Rounds: attended     General Information Comments: TF started over the weekend via SHANE, pt experiences nausea and then refused TF, TF stopped Sunday night and not restarted yet, GI on board, pt refusing some meds as well, requiring enemas and bowel regimen for BM    Nutrition Discharge Planning: Adequate nutrition PO vs EN    Nutrition Prescription Ordered    Current Diet Order: Regular     Current Nutrition Support Formula Ordered: Novasource Renal  Current Nutrition Support Rate Ordered: 45 (ml)  Current Nutrition Support Frequency Ordered: mL/hr        Evaluation of Received Nutrients/Fluid Intake    Enteral Calories (kcal): 2160 (held at this time)  Enteral Protein (gm): 98  Enteral (Free Water) Fluid (mL): 774     Parenteral Calories (kcal): 0  Parenteral Protein (gm): 0  Parenteral Fluid (mL): 0              Total Calories (kcal): 0     Energy Calories Required: not meeting needs  % Kcal Needs: 100              Protein Required: not meeting needs  % Protein Needs: 100           GIR (Glucose Infusion Rate) (mg/kg/min): 0  "mg/kg/min  Lipid Calories (kcals): 0 kcals  I/O: -4.5L since admit, no uop         Fluid Required:  (per MD)  Comments: refusing TF to be restarted 2' nausea  Tolerance: not tolerating  % Intake of Estimated Energy Needs: 0 - 25 %  % Meal Intake: 0%     Nutrition Risk Screen     Nutrition Risk Screen: large or nonhealing wound, burn or pressure ulcer    Nutrition/Diet History    Patient Reported Diet/Restrictions/Preferences: low salt  Typical Food/Fluid Intake: eats very little at this time  Food Preferences: No cultural or Cheondoism food preferences noted  Meal/Snack Patterns: pt likes to cook, when feeling well will cook and then only eat bites  Supplemental Drinks or Food Habits:  (protein powders sometimes)  Factors Affecting Nutritional Intake: abdominal distention, altered gastrointestinal function, decreased appetite                Labs/Tests/Procedures/Meds       Pertinent Labs Reviewed: reviewed, pertinent  Pertinent Labs Comments: Alk Phos 186, ALT 8, TBili 4.4, Cr 4.3, bun 24  Pertinent Medications Reviewed: reviewed, pertinent  Pertinent Medications Comments: bisacodyl, docusate, epoetin, metoclopramide, psyllium, omeprazole    Physical Findings    Overall Physical Appearance: generalized wasting, loss of muscle mass, loss of subcutaneous fat, weak  Tubes:  (-)  Oral/Mouth Cavity: WDL  Skin: intact    Anthropometrics    Temp: 98.2 °F (36.8 °C)     Height: 5' 6" (167.6 cm)  Weight Method: Bed Scale  Weight: 67.8 kg (149 lb 7.6 oz)  Ideal Body Weight (IBW), Male: 142 lb     % Ideal Body Weight, Male (lb): 112.68 lb     BMI (Calculated): 25.9  BMI Grade: 25 - 29.9 - overweight  Weight Loss: unintentional  Usual Body Weight (UBW), k kg (2017 )     % Usual Body Weight: 86.71  % Weight Change From Usual Weight: -13.47 %             Estimated/Assessed Needs    Weight Used For Calorie Calculations: 62.3 kg (137 lb 5.6 oz)   Height (cm): 167.6 cm  Energy Calorie Requirements (kcal): 8418-4843 (30-35 " kcal/kg)  Energy Need Method: Kcal/kg        RMR ([place]-St. Jeor Equation): 1535.75        Weight Used For Protein Calculations: 62.3 kg (137 lb 5.6 oz)  Protein Requirements: 81-94g (1.3-1.5 g/kg)    Fluid Requirements (mL): 1mL/kcal  Fluid Need Method: RDA Method        RDA Method (mL): 1869               Assessment and Plan    Severe protein-calorie malnutrition    Nutrition Diagnosis  Inadequate energy intake    Related to (etiology):   Decreased appetite, abdominal distention, NPO with no nutrition support at this time    Signs and Symptoms (as evidenced by):   Pt meeting ~25% of needs at most via oral intake, hollowing temples, protruding clavicle, visible scapula.     Interventions/Recommendations (treatment strategy):  See recs    Nutrition Diagnosis Status:   Continues              Monitor and Evaluation    Food and Nutrient Intake: energy intake, food and beverage intake  Food and Nutrient Adminstration: diet order  Knowledge/Beliefs/Attitudes: food and nutrition knowledge/skill  Physical Activity and Function: nutrition-related ADLs and IADLs  Anthropometric Measurements: weight, weight change, body mass index  Biochemical Data, Medical Tests and Procedures: electrolyte and renal panel, lipid profile, gastrointestinal profile, glucose/endocrine profile, inflammatory profile  Nutrition-Focused Physical Findings: overall appearance    Nutrition Risk    Level of Risk:  (2x/week)    Nutrition Follow-Up    RD Follow-up?: Yes

## 2018-01-29 NOTE — ASSESSMENT & PLAN NOTE
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      The patient underwent an ERCP on 12/6/2017 which showed a 1mm biliary anastomotic stricture - one [id number] plastic stent placed. bilirubin and AP remained elevated. ERCP 1/17/18. The anastomotic stricture has almost resolved.  Choledocholithiasis was found. Complete removal was accomplished by balloon extraction. One plastic stent, 10Fr x 9cm was placed across the anastomosis as requested by the transplant service.     Recommendations:  - keep patient NPO at midnight   - hold heparin products tomorrow   - we will monitor bilirubin overnight and plan tentatively for ERCP tomorrow

## 2018-01-29 NOTE — SUBJECTIVE & OBJECTIVE
Scheduled Meds:   sodium chloride 0.9%   Intravenous Once    atovaquone  1,500 mg Oral Daily    cycloSPORINE modified  125 mg Oral BID    docusate sodium  100 mg Oral TID PC    epoetin maurisio (PROCRIT) injection  10,000 Units Intravenous Every Tues, Thurs, Sat    heparin (porcine)  5,000 Units Subcutaneous Q12H    levETIRAcetam  500 mg Oral BID    levothyroxine  75 mcg Oral Before breakfast    metoclopramide HCl  5 mg Oral QID (AC & HS)    omeprazole  40 mg Oral BID AC    polyethylene glycol  17 g Oral BID    psyllium husk (aspartame)  3.4 g Oral BID    ursodiol  300 mg Oral BID     Continuous Infusions:    PRN Meds:bisacodyl, heparin (porcine), omnipaque, ondansetron, ondansetron, oxyCODONE, prochlorperazine, simethicone, sodium chloride 0.9%    Review of Systems   Constitutional: Positive for activity change, appetite change and fatigue. Negative for chills and fever.   HENT: Negative.  Negative for congestion and facial swelling.    Eyes: Negative for pain, discharge and visual disturbance.   Respiratory: Negative for cough, shortness of breath and wheezing.    Cardiovascular: Positive for leg swelling. Negative for chest pain.   Gastrointestinal: Positive for abdominal distention, abdominal pain, constipation, nausea and vomiting.   Genitourinary: Positive for decreased urine volume. Negative for difficulty urinating.        Anuric   Musculoskeletal: Negative for arthralgias, back pain and myalgias.   Skin: Positive for wound. Negative for color change and rash.   Allergic/Immunologic: Positive for immunocompromised state.   Neurological: Negative for dizziness, seizures, weakness and headaches.   Psychiatric/Behavioral: Positive for decreased concentration and dysphoric mood. Negative for confusion. The patient is not nervous/anxious.    All other systems reviewed and are negative.    Objective:     Vital Signs (Most Recent):  Temp: 98.2 °F (36.8 °C) (01/29/18 1356)  Pulse: 90 (01/29/18 1500)  Resp:  18 (01/29/18 1356)  BP: 133/84 (01/29/18 1500)  SpO2: 95 % (01/29/18 0730) Vital Signs (24h Range):  Temp:  [98 °F (36.7 °C)-99.3 °F (37.4 °C)] 98.2 °F (36.8 °C)  Pulse:  [] 90  Resp:  [18] 18  SpO2:  [94 %-97 %] 95 %  BP: (122-134)/(70-85) 133/84     Weight: 67.8 kg (149 lb 7.6 oz)  Body mass index is 24.13 kg/m².    Intake/Output - Last 3 Shifts       01/27 0700 - 01/28 0659 01/28 0700 - 01/29 0659 01/29 0700 - 01/30 0659    P.O. 720      Other 650      NG/ 30     .6      Total Intake(mL/kg) 1991.6 (29.4) 30 (0.4)     Urine (mL/kg/hr) 0 (0)      Other 3650 (2.2)      Stool 0 (0)      Total Output 3650        Net -1658.4 +30             Urine Occurrence 0 x      Stool Occurrence 0 x 2 x           Physical Exam   Constitutional: He is oriented to person, place, and time. He appears well-developed. No distress.   Temporal and distal extremity muscle wasting   HENT:   Head: Normocephalic and atraumatic.   Mouth/Throat: No oropharyngeal exudate.   dylan tube   Eyes: EOM are normal. Pupils are equal, round, and reactive to light. Scleral icterus is present.   Neck: Normal range of motion. Neck supple. No JVD present. No thyromegaly present.   Cardiovascular: Normal rate, regular rhythm, normal heart sounds and intact distal pulses.    No murmur heard.  Pulmonary/Chest: Effort normal. No respiratory distress. He has decreased breath sounds in the right middle field, the right lower field and the left lower field. He has no wheezes. He exhibits no tenderness.   Diminished to RLL   Abdominal: Soft. Bowel sounds are normal. He exhibits ascites. He exhibits no distension. There is tenderness (mild). There is no rebound and no guarding.   Dressing to chevron.  Wd vac removed 1/12/18  Dependent edema present R flank; surrounding erythema   Musculoskeletal: Normal range of motion. He exhibits edema (2+ LE edema). He exhibits no tenderness.   Neurological: He is alert and oriented to person, place, and time. He  has normal reflexes.   Skin: Skin is warm and dry. Capillary refill takes 2 to 3 seconds. He is not diaphoretic. No erythema.   jaundice   Psychiatric: He has a normal mood and affect. His behavior is normal. Judgment and thought content normal. His mood appears not anxious.   Depressed mood   Nursing note and vitals reviewed.      Laboratory:  Immunosuppressants         Stop Route Frequency     cycloSPORINE modified capsule 125 mg      -- Oral 2 times daily        CBC:     Recent Labs  Lab 01/29/18  0430 01/29/18  0810   WBC 5.74  --    RBC 2.30*  --    HGB 7.1* 7.0*   HCT 21.5* 21.2*     --    MCV 94  --    MCH 30.9  --    MCHC 33.0  --      CMP:     Recent Labs  Lab 01/29/18  0430   GLU 64*   CALCIUM 10.5   ALBUMIN 2.1*   PROT 5.8*      K 4.0   CO2 25      BUN 24*   CREATININE 4.3*   ALKPHOS 186*   ALT 8*   AST 29   BILITOT 4.4*     Labs within the past 24 hours have been reviewed.    Diagnostic Results:  I have personally reviewed all pertinent imaging studies.

## 2018-01-29 NOTE — ASSESSMENT & PLAN NOTE
- Poor PO intake since transplant requiring short course of TPN during previous hospital stay.   - albumin remains decreased but appetite slowly improving per pt.   - Dietary consulted. Will need to closely monitor PO intake.   - supplements also ordered.   - SHANE with tube feeding start 1/16. Stopped 1/17/18, was not tolerating. Shane removed 1/20.  - prealbumin 7 on 1/15.  - TPN started 1/16/18.   - Pt able to eat minimally 1/21 and 1/22 which is an improvement.  - gi consulted - EGD pending, increased ppi bid, miralax bid, and metamucil bid, decreased narcotics  - continue reglan, placed SHANE tube with tube feeds

## 2018-01-29 NOTE — PROGRESS NOTES
Maintenance dialysis started to [facility].  Tolerated well.

## 2018-01-29 NOTE — PROGRESS NOTES
[facility] HEMODIALYSIS NOTE     Patient currently on hemodialysis for removal of uremic toxins and volume.     Patient seen and evaluated on hemodialysis, tolerating treatment, see HD flowsheet for vitals and assessments.      Ultrafiltration goal is No UF     Labs have been reviewed and the dialysate bath has been adjusted.     Assessment/Plan:  Tolerating treatment well.  Received CT contrast today with hemodialysis afterwards. Continues to c/o nausea with minimal intake so will plan on no UF today.  -H/H remains low/stable.  Continue procrit with HD  -no need for binders at this time.    LANE Beard, FNP-BC  Nephrology  Pager:  [phone]

## 2018-01-29 NOTE — CONSULTS
[facility]  Advanced Endoscopy Service  Consult Note    Patient Name: Jhonny Diana  MRN: [id number]  Admission Date: 1/11/2018  Hospital Length of Stay: 18 days  Code Status: Full Code   Attending Provider: Nathanael Medina MD   Consulting Provider: Chan Liriano MD  Primary Care Physician: Primary Doctor No  Principal Problem:Fever    Inpatient consult to [facility] Service ([facility])  Consult performed by: CHAN LIRIANO.  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      Most recently patient has been dealing with intermittent nausea with no emesis as well as poor PO intake, in addition to his complicated course noted below.    The patient's postoperative course has been complicated by seizures (swtiched off prograf to cyclo), ATN requiring HD (M-W-F, last 1/10, anuric), superficial wound infection s/p wound vac to chevron incision, and multiple admissions for fevers on 11/21 (discharged on empiric augmentin for suspected superficial wound infection), readmitted 11/24 again with fever, and 12/3. Found to have peritonitis in November (WBC 5000, 75% PNM) neg for bile leak. He was treated initially with vanc/cefepime.  He has undergone multiple paracenteses as well as abscess drainage of perihepatic fluid collections and treated with antimicrobial therapy but no positive cultures. The patient underwent an ERCP on 12/6/2017 which showed a 1mm biliary anastomotic stricture - one [id number] plastic stent placed. bilirubin and AP remained elevated. ERCP 1/17/18. The anastomotic stricture has almost resolved.  Choledocholithiasis was found. Complete removal was accomplished by balloon extraction. One plastic stent, 10Fr x 9cm was placed across the anastomosis as requested  by the transplant service.           Past Medical History:   Diagnosis Date    Alcoholic hepatitis with ascites     Alcoholic hepatitis with ascites     History of hematemesis 9/28/2017    Hypertension     Renal disorder        Past Surgical History:   Procedure Laterality Date    APPENDECTOMY      ERCP      ESOPHAGOGASTRODUODENOSCOPY      LIVER TRANSPLANT         Review of patient's allergies indicates:   Allergen Reactions    Bactrim [sulfamethoxazole-trimethoprim] Other (See Comments)     Mookie Trell Syndrome     Family History     None        Social History Main Topics    Smoking status: Former Smoker     Packs/day: 1.00     Years: 10.00     Types: Cigarettes    Smokeless tobacco: Never Used    Alcohol use No      Comment: a fifth of liquor daily for years, cut back over last 2 months    Drug use: No    Sexual activity: Not on file     Review of Systems   Constitutional: Negative for chills and fever.   HENT: Negative for sore throat and trouble swallowing.    Respiratory: Positive for shortness of breath. Negative for cough.    Cardiovascular: Negative for chest pain and palpitations.   Gastrointestinal: Positive for abdominal pain, constipation and nausea.   Hematological: Negative for adenopathy. Does not bruise/bleed easily.   Psychiatric/Behavioral: Negative for agitation and behavioral problems.     Objective:     Vital Signs (Most Recent):  Temp: 98.2 °F (36.8 °C) (01/29/18 1356)  Pulse: 96 (01/29/18 1600)  Resp: 18 (01/29/18 1356)  BP: 131/82 (01/29/18 1600)  SpO2: 95 % (01/29/18 0730) Vital Signs (24h Range):  Temp:  [98 °F (36.7 °C)-99.3 °F (37.4 °C)] 98.2 °F (36.8 °C)  Pulse:  [] 96  Resp:  [18] 18  SpO2:  [94 %-97 %] 95 %  BP: (122-134)/(70-85) 131/82     Weight: 67.8 kg (149 lb 7.6 oz) (01/28/18 0440)  Body mass index is 24.13 kg/m².      Intake/Output Summary (Last 24 hours) at 01/29/18 1623  Last data filed at 01/28/18 1925   Gross per 24 hour   Intake               30 ml    Output                0 ml   Net               30 ml       Lines/Drains/Airways     Central Venous Catheter Line                 Hemodialysis Catheter 10/30/17 1140 right internal jugular 91 days         Percutaneous Central Line Insertion/Assessment - triple lumen  01/16/18 1800 left internal jugular 12 days          Drain                 Trans Pyloric Feeding Tube 01/26/18 nasogastric 3 days                Physical Exam   Constitutional: He is oriented to person, place, and time.   Ill appearing young male in no acute distress   HENT:   Head: Normocephalic and atraumatic.   Eyes: Scleral icterus is present.   Neck: Normal range of motion. Neck supple.   Cardiovascular: Regular rhythm.    No murmur heard.  Tachycardia    Pulmonary/Chest: No respiratory distress.   Course bilateral breath sounds, decreased breath sounds right lower lung fields    Abdominal:   Hypoactive bowel sounds, mild tenderness to palpation diffusely, no rebound tenderness or guarding    Neurological: He is alert and oriented to person, place, and time.   Skin: Skin is warm and dry.   + jaundice        Significant Labs:  All pertinent lab results from the last 24 hours have been reviewed.    Significant Imaging:  Imaging results within the past 24 hours have been reviewed.    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      The patient underwent an ERCP on 12/6/2017 which showed a 1mm biliary anastomotic stricture - one [id number] plastic stent placed. bilirubin and AP remained elevated. ERCP 1/17/18. The anastomotic stricture has almost resolved.  Choledocholithiasis was found. Complete removal was accomplished by balloon extraction. One plastic stent, 10Fr x 9cm was placed across the anastomosis as requested by the transplant  service.     Recommendations:  - keep patient NPO at midnight   - hold heparin products tomorrow   - we will monitor bilirubin overnight and plan tentatively for ERCP tomorrow           Thank you for your consult. I will follow-up with patient. Please contact us if you have any additional questions.    Shen Sousa MD  Gastroenterology Fellow, PGY 6  [facility]

## 2018-01-29 NOTE — SUBJECTIVE & OBJECTIVE
Past Medical History:   Diagnosis Date    Alcoholic hepatitis with ascites     Alcoholic hepatitis with ascites     History of hematemesis 9/28/2017    Hypertension     Renal disorder        Past Surgical History:   Procedure Laterality Date    APPENDECTOMY      ERCP      ESOPHAGOGASTRODUODENOSCOPY      LIVER TRANSPLANT         Review of patient's allergies indicates:   Allergen Reactions    Bactrim [sulfamethoxazole-trimethoprim] Other (See Comments)     Mookie Trell Syndrome     Family History     None        Social History Main Topics    Smoking status: Former Smoker     Packs/day: 1.00     Years: 10.00     Types: Cigarettes    Smokeless tobacco: Never Used    Alcohol use No      Comment: a fifth of liquor daily for years, cut back over last 2 months    Drug use: No    Sexual activity: Not on file     Review of Systems   Constitutional: Negative for chills and fever.   HENT: Negative for sore throat and trouble swallowing.    Respiratory: Positive for shortness of breath. Negative for cough.    Cardiovascular: Negative for chest pain and palpitations.   Gastrointestinal: Positive for abdominal pain, constipation and nausea.   Hematological: Negative for adenopathy. Does not bruise/bleed easily.   Psychiatric/Behavioral: Negative for agitation and behavioral problems.     Objective:     Vital Signs (Most Recent):  Temp: 98.2 °F (36.8 °C) (01/29/18 1356)  Pulse: 96 (01/29/18 1600)  Resp: 18 (01/29/18 1356)  BP: 131/82 (01/29/18 1600)  SpO2: 95 % (01/29/18 0730) Vital Signs (24h Range):  Temp:  [98 °F (36.7 °C)-99.3 °F (37.4 °C)] 98.2 °F (36.8 °C)  Pulse:  [] 96  Resp:  [18] 18  SpO2:  [94 %-97 %] 95 %  BP: (122-134)/(70-85) 131/82     Weight: 67.8 kg (149 lb 7.6 oz) (01/28/18 0440)  Body mass index is 24.13 kg/m².      Intake/Output Summary (Last 24 hours) at 01/29/18 1623  Last data filed at 01/28/18 1925   Gross per 24 hour   Intake               30 ml   Output                0 ml   Net                30 ml       Lines/Drains/Airways     Central Venous Catheter Line                 Hemodialysis Catheter 10/30/17 1140 right internal jugular 91 days         Percutaneous Central Line Insertion/Assessment - triple lumen  01/16/18 1800 left internal jugular 12 days          Drain                 Trans Pyloric Feeding Tube 01/26/18 nasogastric 3 days                Physical Exam   Constitutional: He is oriented to person, place, and time.   Ill appearing young male in no acute distress   HENT:   Head: Normocephalic and atraumatic.   Eyes: Scleral icterus is present.   Neck: Normal range of motion. Neck supple.   Cardiovascular: Regular rhythm.    No murmur heard.  Tachycardia    Pulmonary/Chest: No respiratory distress.   Course bilateral breath sounds, decreased breath sounds right lower lung fields    Abdominal:   Hypoactive bowel sounds, mild tenderness to palpation diffusely, no rebound tenderness or guarding    Neurological: He is alert and oriented to person, place, and time.   Skin: Skin is warm and dry.   + jaundice        Significant Labs:  All pertinent lab results from the last 24 hours have been reviewed.    Significant Imaging:  Imaging results within the past 24 hours have been reviewed.

## 2018-01-30 ENCOUNTER — ANESTHESIA EVENT (OUTPATIENT)
Dept: ENDOSCOPY | Facility: HOSPITAL | Age: 28
DRG: 628 | End: 2018-01-30
Payer: COMMERCIAL

## 2018-01-30 ENCOUNTER — ANESTHESIA (OUTPATIENT)
Dept: ENDOSCOPY | Facility: HOSPITAL | Age: 28
DRG: 628 | End: 2018-01-30
Payer: COMMERCIAL

## 2018-01-30 PROBLEM — R50.9 FEVER: Status: ACTIVE | Noted: 2018-01-30

## 2018-01-30 LAB
ALBUMIN SERPL BCP-MCNC: 2 G/DL
ALP SERPL-CCNC: 165 U/L
ALT SERPL W/O P-5'-P-CCNC: 6 U/L
AMYLASE SERPL-CCNC: 18 U/L
ANION GAP SERPL CALC-SCNC: 9 MMOL/L
AST SERPL-CCNC: 26 U/L
BASOPHILS # BLD AUTO: 0.02 K/UL
BASOPHILS NFR BLD: 0.4 %
BILIRUB SERPL-MCNC: 4.2 MG/DL
BUN SERPL-MCNC: 13 MG/DL
CALCIUM SERPL-MCNC: 9.5 MG/DL
CHLORIDE SERPL-SCNC: 103 MMOL/L
CO2 SERPL-SCNC: 25 MMOL/L
CREAT SERPL-MCNC: 2.8 MG/DL
CYCLOSPORINE BLD LC/MS/MS-MCNC: 214 NG/ML
DIFFERENTIAL METHOD: ABNORMAL
EOSINOPHIL # BLD AUTO: 0.3 K/UL
EOSINOPHIL NFR BLD: 6.3 %
ERYTHROCYTE [DISTWIDTH] IN BLOOD BY AUTOMATED COUNT: 15.7 %
EST. GFR  (AFRICAN AMERICAN): 33.9 ML/MIN/1.73 M^2
EST. GFR  (NON AFRICAN AMERICAN): 29.4 ML/MIN/1.73 M^2
GLUCOSE SERPL-MCNC: 63 MG/DL
HCT VFR BLD AUTO: 19.8 %
HGB BLD-MCNC: 6.5 G/DL
IMM GRANULOCYTES # BLD AUTO: 0.02 K/UL
IMM GRANULOCYTES NFR BLD AUTO: 0.4 %
LIPASE SERPL-CCNC: 12 U/L
LYMPHOCYTES # BLD AUTO: 1 K/UL
LYMPHOCYTES NFR BLD: 22.4 %
MAGNESIUM SERPL-MCNC: 1.9 MG/DL
MCH RBC QN AUTO: 31.4 PG
MCHC RBC AUTO-ENTMCNC: 32.8 G/DL
MCV RBC AUTO: 96 FL
MONOCYTES # BLD AUTO: 0.7 K/UL
MONOCYTES NFR BLD: 14.2 %
NEUTROPHILS # BLD AUTO: 2.6 K/UL
NEUTROPHILS NFR BLD: 56.3 %
NRBC BLD-RTO: 0 /100 WBC
PHOSPHATE SERPL-MCNC: 2.4 MG/DL
PLATELET # BLD AUTO: 159 K/UL
PMV BLD AUTO: 10.3 FL
POTASSIUM SERPL-SCNC: 4 MMOL/L
PROT SERPL-MCNC: 5.5 G/DL
RBC # BLD AUTO: 2.07 M/UL
SODIUM SERPL-SCNC: 137 MMOL/L
WBC # BLD AUTO: 4.64 K/UL

## 2018-01-30 PROCEDURE — 74328 X-RAY BILE DUCT ENDOSCOPY: CPT | Mod: 26,,, | Performed by: INTERNAL MEDICINE

## 2018-01-30 PROCEDURE — 83690 ASSAY OF LIPASE: CPT

## 2018-01-30 PROCEDURE — 84100 ASSAY OF PHOSPHORUS: CPT

## 2018-01-30 PROCEDURE — P9021 RED BLOOD CELLS UNIT: HCPCS

## 2018-01-30 PROCEDURE — 63600175 PHARM REV CODE 636 W HCPCS: Mod: JG | Performed by: SURGERY

## 2018-01-30 PROCEDURE — 99233 SBSQ HOSP IP/OBS HIGH 50: CPT | Mod: ,,, | Performed by: NURSE PRACTITIONER

## 2018-01-30 PROCEDURE — 25000003 PHARM REV CODE 250: Performed by: NURSE PRACTITIONER

## 2018-01-30 PROCEDURE — 27202304 HC CANNULA, ERCP: Performed by: INTERNAL MEDICINE

## 2018-01-30 PROCEDURE — 87040 BLOOD CULTURE FOR BACTERIA: CPT | Mod: 59

## 2018-01-30 PROCEDURE — 43264 ERCP REMOVE DUCT CALCULI: CPT | Performed by: INTERNAL MEDICINE

## 2018-01-30 PROCEDURE — 0WJG0ZZ INSPECTION OF PERITONEAL CAVITY, OPEN APPROACH: ICD-10-PCS | Performed by: INTERNAL MEDICINE

## 2018-01-30 PROCEDURE — 20600001 HC STEP DOWN PRIVATE ROOM

## 2018-01-30 PROCEDURE — 27202125 HC BALLOON, EXTRACTION (ANY): Performed by: INTERNAL MEDICINE

## 2018-01-30 PROCEDURE — 37000008 HC ANESTHESIA 1ST 15 MINUTES: Performed by: INTERNAL MEDICINE

## 2018-01-30 PROCEDURE — 43276 ERCP STENT EXCHANGE W/DILATE: CPT | Mod: 59,,, | Performed by: INTERNAL MEDICINE

## 2018-01-30 PROCEDURE — 74328 X-RAY BILE DUCT ENDOSCOPY: CPT | Performed by: INTERNAL MEDICINE

## 2018-01-30 PROCEDURE — 0FC98ZZ EXTIRPATION OF MATTER FROM COMMON BILE DUCT, VIA NATURAL OR ARTIFICIAL OPENING ENDOSCOPIC: ICD-10-PCS | Performed by: INTERNAL MEDICINE

## 2018-01-30 PROCEDURE — 43264 ERCP REMOVE DUCT CALCULI: CPT | Mod: 59,,, | Performed by: INTERNAL MEDICINE

## 2018-01-30 PROCEDURE — 63600175 PHARM REV CODE 636 W HCPCS: Performed by: NURSE PRACTITIONER

## 2018-01-30 PROCEDURE — C2617 STENT, NON-COR, TEM W/O DEL: HCPCS | Performed by: INTERNAL MEDICINE

## 2018-01-30 PROCEDURE — 37000009 HC ANESTHESIA EA ADD 15 MINS: Performed by: INTERNAL MEDICINE

## 2018-01-30 PROCEDURE — 63600175 PHARM REV CODE 636 W HCPCS: Mod: NTX | Performed by: NURSE ANESTHETIST, CERTIFIED REGISTERED

## 2018-01-30 PROCEDURE — 43276 ERCP STENT EXCHANGE W/DILATE: CPT | Performed by: INTERNAL MEDICINE

## 2018-01-30 PROCEDURE — 25000003 PHARM REV CODE 250: Performed by: PHYSICIAN ASSISTANT

## 2018-01-30 PROCEDURE — 94761 N-INVAS EAR/PLS OXIMETRY MLT: CPT

## 2018-01-30 PROCEDURE — 80158 DRUG ASSAY CYCLOSPORINE: CPT

## 2018-01-30 PROCEDURE — 80053 COMPREHEN METABOLIC PANEL: CPT

## 2018-01-30 PROCEDURE — 85025 COMPLETE CBC W/AUTO DIFF WBC: CPT

## 2018-01-30 PROCEDURE — 0F798DZ DILATION OF COMMON BILE DUCT WITH INTRALUMINAL DEVICE, VIA NATURAL OR ARTIFICIAL OPENING ENDOSCOPIC: ICD-10-PCS | Performed by: INTERNAL MEDICINE

## 2018-01-30 PROCEDURE — 36430 TRANSFUSION BLD/BLD COMPNT: CPT

## 2018-01-30 PROCEDURE — 0FPB8DZ REMOVAL OF INTRALUMINAL DEVICE FROM HEPATOBILIARY DUCT, VIA NATURAL OR ARTIFICIAL OPENING ENDOSCOPIC: ICD-10-PCS | Performed by: INTERNAL MEDICINE

## 2018-01-30 PROCEDURE — 82150 ASSAY OF AMYLASE: CPT

## 2018-01-30 PROCEDURE — 25000003 PHARM REV CODE 250: Mod: NTX | Performed by: NURSE ANESTHETIST, CERTIFIED REGISTERED

## 2018-01-30 PROCEDURE — 83735 ASSAY OF MAGNESIUM: CPT

## 2018-01-30 PROCEDURE — C1769 GUIDE WIRE: HCPCS | Performed by: INTERNAL MEDICINE

## 2018-01-30 PROCEDURE — D9220A PRA ANESTHESIA: Mod: NTX,,, | Performed by: ANESTHESIOLOGY

## 2018-01-30 RX ORDER — MIDAZOLAM HYDROCHLORIDE 1 MG/ML
INJECTION, SOLUTION INTRAMUSCULAR; INTRAVENOUS
Status: DISCONTINUED | OUTPATIENT
Start: 2018-01-30 | End: 2018-01-30

## 2018-01-30 RX ORDER — SODIUM CHLORIDE 9 MG/ML
INJECTION, SOLUTION INTRAVENOUS CONTINUOUS PRN
Status: DISCONTINUED | OUTPATIENT
Start: 2018-01-30 | End: 2018-01-30

## 2018-01-30 RX ORDER — HYDROCODONE BITARTRATE AND ACETAMINOPHEN 500; 5 MG/1; MG/1
TABLET ORAL
Status: DISCONTINUED | OUTPATIENT
Start: 2018-01-30 | End: 2018-01-30

## 2018-01-30 RX ORDER — LIDOCAINE HCL/PF 100 MG/5ML
SYRINGE (ML) INTRAVENOUS
Status: DISCONTINUED | OUTPATIENT
Start: 2018-01-30 | End: 2018-01-30

## 2018-01-30 RX ORDER — PROPOFOL 10 MG/ML
VIAL (ML) INTRAVENOUS CONTINUOUS PRN
Status: DISCONTINUED | OUTPATIENT
Start: 2018-01-30 | End: 2018-01-30

## 2018-01-30 RX ORDER — DOCUSATE SODIUM 100 MG/1
100 CAPSULE, LIQUID FILLED ORAL 2 TIMES DAILY
Status: DISCONTINUED | OUTPATIENT
Start: 2018-01-30 | End: 2018-04-10 | Stop reason: HOSPADM

## 2018-01-30 RX ORDER — PROPOFOL 10 MG/ML
VIAL (ML) INTRAVENOUS
Status: DISCONTINUED | OUTPATIENT
Start: 2018-01-30 | End: 2018-01-30

## 2018-01-30 RX ORDER — FENTANYL CITRATE 50 UG/ML
INJECTION, SOLUTION INTRAMUSCULAR; INTRAVENOUS
Status: DISCONTINUED | OUTPATIENT
Start: 2018-01-30 | End: 2018-01-30

## 2018-01-30 RX ORDER — ACETAMINOPHEN 325 MG/1
650 TABLET ORAL EVERY 6 HOURS PRN
Status: DISCONTINUED | OUTPATIENT
Start: 2018-01-30 | End: 2018-04-10 | Stop reason: HOSPADM

## 2018-01-30 RX ORDER — HEPARIN SODIUM 5000 [USP'U]/ML
5000 INJECTION, SOLUTION INTRAVENOUS; SUBCUTANEOUS EVERY 12 HOURS
Status: DISCONTINUED | OUTPATIENT
Start: 2018-01-31 | End: 2018-01-30

## 2018-01-30 RX ORDER — CEFEPIME HYDROCHLORIDE 1 G/1
1 INJECTION, POWDER, FOR SOLUTION INTRAMUSCULAR; INTRAVENOUS
Status: DISCONTINUED | OUTPATIENT
Start: 2018-01-30 | End: 2018-01-31

## 2018-01-30 RX ORDER — ACETAMINOPHEN 325 MG/1
TABLET ORAL
Status: DISPENSED
Start: 2018-01-30 | End: 2018-01-31

## 2018-01-30 RX ORDER — HYDROCODONE BITARTRATE AND ACETAMINOPHEN 500; 5 MG/1; MG/1
TABLET ORAL
Status: DISCONTINUED | OUTPATIENT
Start: 2018-01-30 | End: 2018-02-10

## 2018-01-30 RX ORDER — SODIUM CHLORIDE 9 MG/ML
INJECTION, SOLUTION INTRAVENOUS ONCE
Status: COMPLETED | OUTPATIENT
Start: 2018-01-30 | End: 2018-01-31

## 2018-01-30 RX ADMIN — CEFEPIME 1 G: 1 INJECTION, POWDER, FOR SOLUTION INTRAMUSCULAR; INTRAVENOUS at 11:01

## 2018-01-30 RX ADMIN — MIDAZOLAM HYDROCHLORIDE 2 MG: 1 INJECTION, SOLUTION INTRAMUSCULAR; INTRAVENOUS at 12:01

## 2018-01-30 RX ADMIN — ONDANSETRON HYDROCHLORIDE 4 MG: 2 INJECTION, SOLUTION INTRAMUSCULAR; INTRAVENOUS at 10:01

## 2018-01-30 RX ADMIN — VANCOMYCIN 1000 MG: 1 INJECTION, SOLUTION INTRAVENOUS at 10:01

## 2018-01-30 RX ADMIN — DOCUSATE SODIUM 100 MG: 100 CAPSULE, LIQUID FILLED ORAL at 09:01

## 2018-01-30 RX ADMIN — METOCLOPRAMIDE HYDROCHLORIDE 5 MG: 5 TABLET ORAL at 05:01

## 2018-01-30 RX ADMIN — LIDOCAINE HYDROCHLORIDE 100 MG: 20 INJECTION, SOLUTION INTRAVENOUS at 12:01

## 2018-01-30 RX ADMIN — OXYCODONE HYDROCHLORIDE 5 MG: 5 TABLET ORAL at 08:01

## 2018-01-30 RX ADMIN — ONDANSETRON HYDROCHLORIDE 4 MG: 2 INJECTION, SOLUTION INTRAMUSCULAR; INTRAVENOUS at 09:01

## 2018-01-30 RX ADMIN — PROPOFOL 150 MCG/KG/MIN: 10 INJECTION, EMULSION INTRAVENOUS at 12:01

## 2018-01-30 RX ADMIN — LEVETIRACETAM 500 MG: 500 TABLET ORAL at 08:01

## 2018-01-30 RX ADMIN — OMEPRAZOLE 40 MG: 40 CAPSULE, DELAYED RELEASE ORAL at 06:01

## 2018-01-30 RX ADMIN — CYCLOSPORINE 125 MG: 100 CAPSULE, LIQUID FILLED ORAL at 05:01

## 2018-01-30 RX ADMIN — OXYCODONE HYDROCHLORIDE 5 MG: 5 TABLET ORAL at 03:01

## 2018-01-30 RX ADMIN — FENTANYL CITRATE 50 MCG: 50 INJECTION, SOLUTION INTRAMUSCULAR; INTRAVENOUS at 12:01

## 2018-01-30 RX ADMIN — LEVETIRACETAM 500 MG: 500 TABLET ORAL at 09:01

## 2018-01-30 RX ADMIN — PROPOFOL 80 MG: 10 INJECTION, EMULSION INTRAVENOUS at 12:01

## 2018-01-30 RX ADMIN — POLYETHYLENE GLYCOL 3350 17 G: 17 POWDER, FOR SOLUTION ORAL at 09:01

## 2018-01-30 RX ADMIN — CEFEPIME 1 G: 1 INJECTION, POWDER, FOR SOLUTION INTRAMUSCULAR; INTRAVENOUS at 10:01

## 2018-01-30 RX ADMIN — OMEPRAZOLE 40 MG: 40 CAPSULE, DELAYED RELEASE ORAL at 05:01

## 2018-01-30 RX ADMIN — OXYCODONE HYDROCHLORIDE 5 MG: 5 TABLET ORAL at 11:01

## 2018-01-30 RX ADMIN — ATOVAQUONE 1500 MG: 750 SUSPENSION ORAL at 08:01

## 2018-01-30 RX ADMIN — METOCLOPRAMIDE HYDROCHLORIDE 5 MG: 5 TABLET ORAL at 10:01

## 2018-01-30 RX ADMIN — OXYCODONE HYDROCHLORIDE 5 MG: 5 TABLET ORAL at 02:01

## 2018-01-30 RX ADMIN — METOCLOPRAMIDE HYDROCHLORIDE 5 MG: 5 TABLET ORAL at 09:01

## 2018-01-30 RX ADMIN — URSODIOL 300 MG: 300 CAPSULE ORAL at 08:01

## 2018-01-30 RX ADMIN — PROPOFOL 50 MG: 10 INJECTION, EMULSION INTRAVENOUS at 12:01

## 2018-01-30 RX ADMIN — ERYTHROPOIETIN 10000 UNITS: 10000 INJECTION, SOLUTION INTRAVENOUS; SUBCUTANEOUS at 02:01

## 2018-01-30 RX ADMIN — URSODIOL 300 MG: 300 CAPSULE ORAL at 09:01

## 2018-01-30 RX ADMIN — ACETAMINOPHEN 650 MG: 325 TABLET, FILM COATED ORAL at 06:01

## 2018-01-30 RX ADMIN — LEVOTHYROXINE SODIUM 75 MCG: 75 TABLET ORAL at 05:01

## 2018-01-30 RX ADMIN — OXYCODONE HYDROCHLORIDE 5 MG: 5 TABLET ORAL at 06:01

## 2018-01-30 RX ADMIN — LIDOCAINE HYDROCHLORIDE 50 MG: 20 INJECTION, SOLUTION INTRAVENOUS at 12:01

## 2018-01-30 RX ADMIN — SODIUM CHLORIDE: 0.9 INJECTION, SOLUTION INTRAVENOUS at 12:01

## 2018-01-30 NOTE — PROGRESS NOTES
[facility]  Liver Transplant  Progress Note    Patient Name: Jhonny Diana  MRN: [id number]  Admission Date: 2018  Hospital Length of Stay: 19 days  Code Status: Full Code  Primary Care Provider: Primary Doctor No  Post-Operative Day: 103    ORGAN:   LIVER  Disease Etiology: Acute Alcoholic Hepatitis  Donor Type:    - Brain Death  CDC High Risk:   No  Donor CMV Status:   Donor CMV Status: Positive  Donor HBcAB:   Negative  Donor HCV Status:   Negative  Whole or Partial: Whole Liver  Biliary Anastomosis: End to End  Arterial Anatomy: Standard  Subjective:     History of Present Illness:  Jhonny Diana is a 28 y/o male with past medical history of alcoholic cirrhosis.  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 Found to have peritonitis in November (WBC 5000, 75% PNM) neg for bile leak. He was treated initially with vanc/cefepime. Repeat cell counts  with some improvement (WBC 1400, 45% PNM). He has undergone multiple paracenteses as well as abscess drainage of perihepatic fluid collections and treated with antimicrobial therapy but no positive cultures. Of note, biliary stricture also identified and ERCP performed on 2017 with sphincterotomy and biliary stent placed. Liver tests still have not normalized despite intervention, bilirubin and AP remain elevated. Other pertinent PMH current wound vac in place 2/2 wound infection, malnutrition requiring TPN for short course and ongoing hypoalbuminemia, and seizure activity while on prograf and has since been switched to cyclosporine without reoccurrence.  He presented to the ER for fever, abdominal pain, and N/V. He reports fever (103) for 1  day prior. Overnight, he developed N/V, reports small amount of green emesis with new left sided pain. He also endorses worsening SOB with exertion. He was scheduled as an outpatient for follow up paracentesis and IR drainage of fluid collection. CXR in ER shows large pleural effusion with subsegmental atelectasis. Infectious work up initiated in ER. His ANC is 900. Broad spectrum antibiotics initiated in ED. He denies chest pain, palpitations, diarrhea, constipation, or back pain. Denies any sick contacts.    Hospital Course:  Thoracentesis (1.2L off), Paracentesis (1.6L off), and IR drainage of fluid collection 1/11, all fluids negative for infection. ID consulted. Broad spectrum antibiotics d/c'd 1/15. Chronically malnourished with poor PO intake, prealbumin 7. Shane tube placed 1/15 for tube feedings.     ERCP 1/17 with sludge and a biliary stone which was removed and stent exchanged.  Remained with n/v; therefore, TF remained on hold and TPN continued.   Pt with fever s/p ERCP on 1/17 which continued until 1/19.  Vanc/cefepime restarted.  Blood cx 1/17 and 1/18 NGTD.  ID reconsulted.  Fungal markers sent.  CT C/A/P obtained.  With large R pleural effusion and ascites- both drained 1/19 and negative for infx per cell count. Pt afebrile 1/20. Liver biopsy 1/23 - without rejection.  EGD 1/25 - unremarkable for source of GI symptoms.  Resume diet along with other GI recommendations. SHANE tube placed for tube feeds.      Interval History:  CT scan of chest/abd/pelvis with IV & PO contrast completed and reviewed.  Transfused 1 unit prbc 2/2 low h/h.  No obvious signs of bleeding. Plan for thoracentesis in am by IR.  Consulted PBS due to bili elevated on am labs.  ERCP completed with finding of Choledocholithiasis.     No new subjective & objective note has been filed under this hospital service since the last note was generated.    Assessment/Plan:     Severe protein-calorie malnutrition    - Poor PO intake since  transplant requiring short course of TPN during previous hospital stay.   - albumin remains decreased but appetite slowly improving per pt.   - Dietary consulted. Will need to closely monitor PO intake.   - supplements also ordered.   - SHANE with tube feeding start 1/16. Stopped 1/17/18, was not tolerating. Graham removed 1/20.  - prealbumin 7 on 1/15.  - TPN started 1/16/18.   - Pt able to eat minimally 1/21 and 1/22 which is an improvement.  - gi consulted - EGD reviewed, increased ppi bid, miralax bid, and metamucil bid, decreased narcotics  - continue reglan, placed SHANE tube with tube feeds          Constipation    - Enema ordered 1/20 and 1/21 with BM  - Encourage ambulation.  - decrease narcotics  - metamucil and miralax bid  - reglan qid            Acute renal failure with tubular necrosis    - HD resumed on previous admission. Now anuric and dialyzes M-W-F.  - Nephrology following.    - HD Sat since held Friday for thora/para.  - HD transitioned to TTS schedule.  - ktm consulted for possible kidney transplant         Delayed surgical wound healing    - wd vac removed 1/12/18.  Wound healing well. Aquacel AG and Mepilex border dressing applied via wound care recs.           Liver transplanted    - Post op course complicated by fevers and hyperbilirubinemia.  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  - AST/ALT normal. Tbili/alkphos remain elevated.  - Liver US 1/8 showed 3.9 cm complex fluid collection anterior to right lobe and moderate nonspecific complex ascites inferior to transplant.   - IR placed drain 1/11/18, cell count negative for infection.  - PBS consulted. PBS not comfortable proceeding with EUS with liver biopsy given fever and possible infectious process going on. ERCP 1/17 with stone and sludge. Pt placed on Actigall - treated with 10 day course of abx.  ID now signed off.  - Bili remains elevated - liver u/s 1/28  - Liver biopsy 1/23 without rejection.  - consulted PBS for  ERCP - Choledocholithiasis was found          Long-term use of immunosuppressant medication    - Maintenance IS with cyclosporine. MMF on hold for infections and neutropenia. Continue to check cyclosporine level daily. Assess for toxicity and adjust level as needed.        Prophylactic immunotherapy    - See long term use of immunosuppression.         Recurrent pleural effusion on right    - CXR in ER with large right pleural effusion with subjective c/o worsening SOB  - thoracentesis completed- 1200 ml removed.  SOB with significant improvement.   - Cell count reviewed and negative for infection.   - resp even and non labored.  O2 sat 94-98%.  - Repeat thora 1/19 negative for infection per cell count  - Reaccumulating fluid on xray 1/21.  - thoracentesis in am 1/31        Biliary stricture of transplanted liver    - bili rising  - consult pbs for ercp in am          At risk for opportunistic infections    - Hold Valcyte as WBC low.  CMV PCR 1/11, 1/18 undetected.  - bactrim held.          Anemia of chronic disease    - PRBC x 1 1/19  - PRBC x 1 1/21  - Continue daily CBC.  - H&H trending down   - blood transfusion today        Other ascites    - Paracentesis performed 1/11/18 with 1600 ml removed.    - fluid negative for infection.   - Repeat para 1/19 negative for infection per cell count  - no fluid seen for paracentesis on 1/23.        Nausea and vomiting    - h/o constipation, reports having regular BM's with bowel regimen  - vomiting episode x 1 at home. Anti-emetics ordered PRN.  - KUB repeated 1/14/18 given increased abdominal pain- mild, generalized bowel distention suggesting ileus.  Diet changed to clears as tolerated for bowel rest   - SHANE tube placed 1/16 for tube feeds. Not tolerating tube feeds, stopped 1/17/18.  - D/C shane 1/19  - chronic constipation, Cont bowel regimen.   - GI consulted.              VTE Risk Mitigation         Ordered     heparin (porcine) injection 1,000 Units  As needed (PRN)      Route:  Intra-Catheter        01/12/18 1017     Medium Risk of VTE  Once      01/11/18 0351     Place sequential compression device  Until discontinued      01/11/18 0351          The patients clinical status was discussed at multidisplinary rounds, involving transplant surgery, transplant medicine, pharmacy, nursing, nutrition, and social work    Discharge Planning: not candidate at this time  Monitor kidney function --> may need outpt HD  Monitor HH needs vs rehab for deconditioned status      Seda Camacho, NP  Liver Transplant  [facility]

## 2018-01-30 NOTE — ASSESSMENT & PLAN NOTE
- Enema ordered 1/20 and 1/21 with BM  - Encourage ambulation.  - decrease narcotics  - metamucil and miralax bid  - reglan qid

## 2018-01-30 NOTE — ANESTHESIA POSTPROCEDURE EVALUATION
"Anesthesia Post Evaluation    Patient: Jhonny Diana    Procedure(s) Performed: Procedure(s) (LRB):  ERCP (N/A)    Final Anesthesia Type: general  Patient location during evaluation: PACU  Patient participation: Yes- Able to Participate  Level of consciousness: awake and alert  Post-procedure vital signs: reviewed and stable  Pain management: adequate  Airway patency: patent  PONV status at discharge: No PONV  Anesthetic complications: no      Cardiovascular status: blood pressure returned to baseline and hemodynamically stable  Respiratory status: unassisted  Hydration status: euvolemic  Follow-up not needed.        Visit Vitals  /82   Pulse 85   Temp 37.4 °C (99.4 °F) (Oral)   Resp 16   Ht 5' 6" (1.676 m)   Wt 67.8 kg (149 lb 7.6 oz)   SpO2 96%   BMI 24.13 kg/m²       Pain/Diogenes Score: Pain Assessment Performed: Yes (1/30/2018  1:45 PM)  Presence of Pain: denies (1/30/2018  1:45 PM)  Pain Rating Prior to Med Admin: 6 (1/30/2018  8:20 AM)  Pain Rating Post Med Admin: 4 (1/30/2018  9:17 AM)  Diogenes Score: 9 (1/30/2018  1:45 PM)      "

## 2018-01-30 NOTE — ASSESSMENT & PLAN NOTE
- CXR in ER with large right pleural effusion with subjective c/o worsening SOB  - thoracentesis completed- 1200 ml removed.  SOB with significant improvement.   - Cell count reviewed and negative for infection.   - resp even and non labored.  O2 sat 94-98%.  - Repeat thora 1/19 negative for infection per cell count  - Reaccumulating fluid on xray 1/21.  - thoracentesis today, cell counts pending.

## 2018-01-30 NOTE — PLAN OF CARE
Problem: Patient Care Overview  Goal: Plan of Care Review  Outcome: Ongoing (interventions implemented as appropriate)  3 hour dialysis complete.  Blood rinsed back.  RIJ permcath flushed with normal saline, both lumens filled with heparin, capped and taped.  No fluid removed.  Tolerated well.

## 2018-01-30 NOTE — NURSING TRANSFER
Nursing Transfer Note      1/30/2018     Transfer To: [id number]    Transfer via stretcher    Transported by PCT    Medicines sent: N/A    Chart send with patient: Yes    Patient reassessed at: 1/30/18 @ 5357

## 2018-01-30 NOTE — ANESTHESIA PREPROCEDURE EVALUATION
01/30/2018  Jhonny Diana is a 27 y.o., male.  Pre-operative evaluation for Procedure(s) (LRB):  ERCP (N/A)    Review of patient's allergies indicates:   Allergen Reactions    Bactrim [sulfamethoxazole-trimethoprim] Other (See Comments)     Mookie Trell Syndrome       No current facility-administered medications on file prior to encounter.      Current Outpatient Prescriptions on File Prior to Encounter   Medication Sig Dispense Refill    aspirin (ECOTRIN) 81 MG EC tablet Take 1 tablet (81 mg total) by mouth once daily. 30 tablet 5    atovaquone (MEPRON) 750 mg/5 mL Susp Take 10 mLs (1,500 mg total) by mouth once daily. Stop on 4/17/18 300 mL 5    cycloSPORINE modified, NEORAL, 25 MG capsule Take 3 capsules (75 mg total) by mouth 2 (two) times daily. 90 capsule 5    docusate sodium (COLACE) 100 MG capsule Take 100 mg by mouth 3 (three) times daily as needed for Constipation.      ergocalciferol (ERGOCALCIFEROL) 50,000 unit Cap Take 1 capsule (50,000 Units total) by mouth every 7 days. 4 capsule 5    levETIRAcetam (KEPPRA) 500 MG Tab Take 1 tablet (500 mg total) by mouth 2 (two) times daily. 60 tablet 11    levothyroxine (SYNTHROID) 75 MCG tablet Take 1 tablet (75 mcg total) by mouth before breakfast. 30 tablet 11    mirtazapine (REMERON) 7.5 MG Tab Take 1 tablet (7.5 mg total) by mouth every evening. 30 tablet 11    multivitamin (THERAGRAN) tablet Take 1 tablet by mouth once daily. 30 tablet 11    ondansetron (ZOFRAN-ODT) 8 MG TbDL Take 1 tablet (8 mg total) by mouth every 8 (eight) hours as needed (nausea). 30 tablet 1    oxyCODONE (ROXICODONE) 10 mg Tab immediate release tablet Take 0.5-1 tablets (5-10 mg total) by mouth every 6 (six) hours as needed for Pain. 30 tablet 0    pantoprazole (PROTONIX) 40 MG tablet Take 1 tablet (40 mg total) by mouth once daily. 30 tablet 5    sodium bicarbonate  650 MG tablet Take 2 tablets (1,300 mg total) by mouth 2 (two) times daily. 120 tablet 2    thiamine 100 MG tablet Take 1 tablet (100 mg total) by mouth once daily. 30 tablet 6    ursodiol (ACTIGALL) 300 mg capsule Take 1 capsule (300 mg total) by mouth 2 (two) times daily. 60 capsule 11       Patient Active Problem List   Diagnosis    Alcoholism /alcohol abuse    Acute renal failure with tubular necrosis    Leukocytosis    Liver transplanted    Prophylactic immunotherapy    Long-term use of immunosuppressant medication    At risk for opportunistic infections    Seizure    Delayed surgical wound healing    Anemia of chronic disease    Biliary stricture of transplanted liver    Severe malnutrition    Hypothyroidism    Hypervolemia    N&V (nausea and vomiting)    Nausea and vomiting    Severe protein-calorie malnutrition    Other ascites    Constipation    Abdominal pain       Past Surgical History:   Procedure Laterality Date    APPENDECTOMY      ERCP      ESOPHAGOGASTRODUODENOSCOPY      LIVER TRANSPLANT             Recent Labs      01/30/18   0448   HCT  19.8*     Recent Labs      01/30/18   0448   PLT  159     Recent Labs      01/30/18   0448   K  4.0     Recent Labs      01/30/18   0448   CREATININE  2.8*     Recent Labs      01/30/18   0448   GLU  63*     No results for input(s): PT in the last 72 hours.                    Anesthesia Evaluation         Review of Systems  Anesthesia Hx:  No problems with previous Anesthesia    Social:  Non-Smoker, Alcohol Use    Hematology/Oncology:  Hematology Normal   Oncology Normal     Cardiovascular:   Denies Hypertension.  Denies MI.    Denies Angina.    Pulmonary:  Pulmonary Normal  Denies COPD.  Denies Asthma.  Denies Shortness of breath.    Hepatic/GI:   Liver Disease, (liver transplant.)    Neurological:   Denies TIA. Denies CVA. Denies Seizures.    Endocrine:   Denies Diabetes.        Physical Exam  General:  Well nourished     Airway/Jaw/Neck:  Airway Findings: Mouth Opening: Normal Tongue: Normal  General Airway Assessment: Adult, Average  Mallampati: II  TM Distance: Normal, at least 6 cm  Jaw/Neck Findings:  Neck ROM: Normal ROM            Mental Status:  Mental Status Findings:  Cooperative, Alert and Oriented         Anesthesia Plan  Type of Anesthesia, risks & benefits discussed:  Anesthesia Type:  general  Patient's Preference:   Intra-op Monitoring Plan:   Intra-op Monitoring Plan Comments:   Post Op Pain Control Plan:   Post Op Pain Control Plan Comments: As per surgeon's plan  Induction:   IV  Beta Blocker:  Patient is not currently on a Beta-Blocker (No further documentation required).       Informed Consent: Patient understands risks and agrees with Anesthesia plan.  Questions answered. Anesthesia consent signed with patient.  ASA Score: 2     Day of Surgery Review of History & Physical:    H&P update referred to the surgeon.         Ready For Surgery From Anesthesia Perspective.     Present Prior to Hospital Arrival?: No; Placement Date: 10/19/17; Placement Time: 1747; Method of Intubation: Direct laryngoscopy; Inserted by: CRNA; Airway Device: Endotracheal Tube; Mask Ventilation: Not Attempted; Intubated: Postinduction; Blade: Moore #2; Airway Device Size: 8.0; Style: Cuffed; Cuff Inflation: Minimal occlusive pressure; Inflation Amount: 5; Placement Verified By: Auscultation, Capnometry, ETT Condensation; Grade: Grade I; Complicating Factors: None; Intubation Findings: Positive EtCO2, Bilateral breath sounds, Atraumatic/Condition of teeth unchanged;  Depth of Insertion: 22; Securment: Lips; Complications: None; Breath Sounds: Equal Bilateral; Insertion Attempts: 1; Removal Date: 10/21/17;  Removal Time: 1633; Removal Indication & Assessment: removed per order; Name of Person who Removed: RUPESH Mclean CRT

## 2018-01-30 NOTE — ASSESSMENT & PLAN NOTE
- Poor PO intake since transplant requiring short course of TPN during previous hospital stay.   - albumin remains decreased but appetite slowly improving per pt.   - Dietary consulted. Will need to closely monitor PO intake.   - supplements also ordered.   - SHANE with tube feeding start 1/16. Stopped 1/17/18, was not tolerating. Shane removed 1/20.  - prealbumin 7 on 1/15.  - TPN started 1/16/18.   - Pt able to eat minimally 1/21 and 1/22 which is an improvement.  - gi consulted - EGD reviewed, increased ppi bid, miralax bid, and metamucil bid, decreased narcotics  - continue reglan, placed SHANE tube with tube feeds

## 2018-01-30 NOTE — PLAN OF CARE
Problem: Patient Care Overview  Goal: Plan of Care Review  Outcome: Ongoing (interventions implemented as appropriate)  Pt tolerating all treatments and therapies well, pt is AAOx4 and VSS, pt reported pain that was controlled with prn medication, pt bed is low and locked and call bell is in reach pt instructed to call if any assistance is needed, pt family is at the bedside and is involved with pt care, pt remained afebrile and showed no signs of infection, pt can position self in bed and shows no signs of skin breakdown, no acute events noted or reported, will continue to monitor.

## 2018-01-30 NOTE — PLAN OF CARE
Problem: Patient Care Overview  Goal: Plan of Care Review  Outcome: Ongoing (interventions implemented as appropriate)  Pt aao x4. Call bell within reach. Mother at bedside. He had ercp done today. abd x-ray done to confirm placement after he returned. Tube had to be advanced. Waiting on another x-ray to confirm placement. Plan to restart tube feeding. Plan for HD in am and thoracentesis in pm. Will continue to monitor.

## 2018-01-30 NOTE — PROGRESS NOTES
Off the unit in ERCP. Will attempt to see wound at a later time today.  Zoë Murphy RN CWON  [phone]

## 2018-01-30 NOTE — ANESTHESIA RELEASE NOTE
"Anesthesia Release from PACU Note    Patient: Jhonny Diana    Procedure(s) Performed: Procedure(s) (LRB):  ERCP (N/A)    Anesthesia type: general    Post pain: Adequate analgesia    Post assessment: no apparent anesthetic complications    Last Vitals:   Visit Vitals  /82   Pulse 85   Temp 37.4 °C (99.4 °F) (Oral)   Resp 16   Ht 5' 6" (1.676 m)   Wt 67.8 kg (149 lb 7.6 oz)   SpO2 96%   BMI 24.13 kg/m²       Post vital signs: stable    Level of consciousness: awake    Nausea/Vomiting: no nausea/no vomiting    Complications: none    Airway Patency: patent    Respiratory: unassisted    Cardiovascular: stable and blood pressure at baseline    Hydration: euvolemic  "

## 2018-01-30 NOTE — ASSESSMENT & PLAN NOTE
- Post op course complicated by fevers and hyperbilirubinemia.  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  - AST/ALT normal. Tbili/alkphos remain elevated.  - Liver US 1/8 showed 3.9 cm complex fluid collection anterior to right lobe and moderate nonspecific complex ascites inferior to transplant.   - IR placed drain 1/11/18, cell count negative for infection.  - PBS consulted. PBS not comfortable proceeding with EUS with liver biopsy given fever and possible infectious process going on. ERCP 1/17 with stone and sludge. Pt placed on Actigall - treated with 10 day course of abx.  ID now signed off.  - Bili remains elevated - liver u/s 1/28  - Liver biopsy 1/23 without rejection.  - consulted PBS for ERCP - Choledocholithiasis was found

## 2018-01-30 NOTE — ASSESSMENT & PLAN NOTE
- PRBC x 1 1/19  - PRBC x 1 1/21  - Continue daily CBC.  - H&H trending down   - blood transfusion today

## 2018-01-30 NOTE — TRANSFER OF CARE
"Anesthesia Transfer of Care Note    Patient: Jhonny Diana    Procedure(s) Performed: Procedure(s) (LRB):  ERCP (N/A)    Patient location: PACU    Anesthesia Type: general    Transport from OR: Transported from OR on room air with adequate spontaneous ventilation    Post pain: adequate analgesia    Post assessment: no apparent anesthetic complications    Post vital signs: stable    Level of consciousness: awake    Nausea/Vomiting: no nausea/vomiting    Complications: none    Transfer of care protocol was followed      Last vitals:   Visit Vitals  /81 (BP Location: Left arm, Patient Position: Lying)   Pulse 92   Temp 37.4 °C (99.4 °F) (Oral)   Resp 18   Ht 5' 6" (1.676 m)   Wt 67.8 kg (149 lb 7.6 oz)   SpO2 (!) 94%   BMI 24.13 kg/m²     "

## 2018-01-30 NOTE — SUBJECTIVE & OBJECTIVE
Scheduled Meds:   atovaquone  1,500 mg Oral Daily    ceFEPime (MAXIPIME) IVPB  1 g Intravenous Q12H    cycloSPORINE modified  125 mg Oral BID    docusate sodium  100 mg Oral BID    epoetin maurisio (PROCRIT) injection  10,000 Units Intravenous Every Tues, Thurs, Sat    levETIRAcetam  500 mg Oral BID    levothyroxine  75 mcg Oral Before breakfast    metoclopramide HCl  5 mg Oral QID (AC & HS)    omeprazole  40 mg Oral BID AC    polyethylene glycol  17 g Oral BID    psyllium husk (aspartame)  3.4 g Oral BID    ursodiol  300 mg Oral BID     Continuous Infusions:    PRN Meds:sodium chloride, bisacodyl, heparin (porcine), omnipaque, ondansetron, ondansetron, oxyCODONE, prochlorperazine, simethicone, sodium chloride 0.9%    Review of Systems   Constitutional: Positive for activity change, appetite change and fatigue. Negative for chills and fever.   HENT: Negative.  Negative for congestion and facial swelling.    Eyes: Negative for pain, discharge and visual disturbance.   Respiratory: Negative for cough, shortness of breath and wheezing.    Cardiovascular: Negative for chest pain and leg swelling.   Gastrointestinal: Positive for abdominal distention, abdominal pain and constipation. Negative for nausea and vomiting.   Genitourinary: Positive for decreased urine volume. Negative for difficulty urinating.        Anuric   Musculoskeletal: Negative for arthralgias, back pain and myalgias.   Skin: Positive for wound. Negative for color change and rash.   Allergic/Immunologic: Positive for immunocompromised state.   Neurological: Negative for dizziness, seizures, weakness and headaches.   Psychiatric/Behavioral: Positive for decreased concentration and dysphoric mood. Negative for confusion. The patient is not nervous/anxious.    All other systems reviewed and are negative.    Objective:     Vital Signs (Most Recent):  Temp: 99.4 °F (37.4 °C) (01/30/18 1329)  Pulse: 93 (01/30/18 1424)  Resp: 14 (01/30/18 1424)  BP:  (!) 131/96 (01/30/18 1424)  SpO2: (!) 94 % (01/30/18 1424) Vital Signs (24h Range):  Temp:  [98.6 °F (37 °C)-99.9 °F (37.7 °C)] 99.4 °F (37.4 °C)  Pulse:  [85-98] 93  Resp:  [14-20] 14  SpO2:  [94 %-98 %] 94 %  BP: (122-139)/(73-96) 131/96     Weight: 67.8 kg (149 lb 7.6 oz)  Body mass index is 24.13 kg/m².    Intake/Output - Last 3 Shifts       01/28 0700 - 01/29 0659 01/29 0700 - 01/30 0659 01/30 0700 - 01/31 0659    P.O.  1040 0    I.V. (mL/kg)   400 (5.9)    Blood   306.7    Other  600     NG/GT 30      IV Piggyback   250    TPN       Total Intake(mL/kg) 30 (0.4) 1640 (24.2) 956.7 (14.1)    Urine (mL/kg/hr)  0 (0) 0 (0)    Other  600 (0.4)     Stool  0 (0) 0 (0)    Total Output   600 0    Net +30 +1040 +956.7           Urine Occurrence  0 x     Stool Occurrence 2 x 0 x 1 x    Emesis Occurrence  0 x           Physical Exam   Constitutional: He is oriented to person, place, and time. He appears well-developed. No distress.   Temporal and distal extremity muscle wasting   HENT:   Head: Normocephalic and atraumatic.   Mouth/Throat: No oropharyngeal exudate.   dylan tube   Eyes: EOM are normal. Pupils are equal, round, and reactive to light. Scleral icterus is present.   Neck: Normal range of motion. Neck supple. No JVD present. No thyromegaly present.   Cardiovascular: Normal rate, regular rhythm, normal heart sounds and intact distal pulses.    No murmur heard.  Pulmonary/Chest: Effort normal. No respiratory distress. He has decreased breath sounds in the right middle field, the right lower field and the left lower field. He has no wheezes. He exhibits no tenderness.   Diminished to RLL   Abdominal: Soft. Bowel sounds are normal. He exhibits ascites. He exhibits no distension. There is tenderness (mild). There is no rebound and no guarding.   Dressing to chevron.  Wd vac removed 1/12/18  Dependent edema present R flank; surrounding erythema   Musculoskeletal: Normal range of motion. He exhibits edema (2+ LE edema).  He exhibits no tenderness.   Neurological: He is alert and oriented to person, place, and time. He has normal reflexes.   Skin: Skin is warm and dry. Capillary refill takes 2 to 3 seconds. He is not diaphoretic. No erythema.   jaundice   Psychiatric: He has a normal mood and affect. His behavior is normal. Judgment and thought content normal. His mood appears not anxious.   Depressed mood   Nursing note and vitals reviewed.      Laboratory:  Immunosuppressants         Stop Route Frequency     cycloSPORINE modified capsule 125 mg      -- Oral 2 times daily        CBC:     Recent Labs  Lab 01/30/18  0448   WBC 4.64   RBC 2.07*   HGB 6.5*   HCT 19.8*      MCV 96   MCH 31.4*   MCHC 32.8     CMP:     Recent Labs  Lab 01/30/18  0448   GLU 63*   CALCIUM 9.5   ALBUMIN 2.0*   PROT 5.5*      K 4.0   CO2 25      BUN 13   CREATININE 2.8*   ALKPHOS 165*   ALT 6*   AST 26   BILITOT 4.2*     Labs within the past 24 hours have been reviewed.    Diagnostic Results:  I have personally reviewed all pertinent imaging studies.

## 2018-01-31 ENCOUNTER — TELEPHONE (OUTPATIENT)
Dept: TRANSPLANT | Facility: CLINIC | Age: 28
End: 2018-01-31

## 2018-01-31 PROBLEM — Z76.82 KIDNEY TRANSPLANT CANDIDATE: Status: ACTIVE | Noted: 2018-01-31

## 2018-01-31 LAB
ALBUMIN SERPL BCP-MCNC: 2 G/DL
ALP SERPL-CCNC: 165 U/L
ALT SERPL W/O P-5'-P-CCNC: 7 U/L
ANION GAP SERPL CALC-SCNC: 13 MMOL/L
APPEARANCE FLD: NORMAL
AST SERPL-CCNC: 28 U/L
BASOPHILS # BLD AUTO: 0.02 K/UL
BASOPHILS NFR BLD: 0.4 %
BILIRUB SERPL-MCNC: 5.1 MG/DL
BODY FLD TYPE: NORMAL
BODY FLUID SOURCE, LDH: NORMAL
BUN SERPL-MCNC: 20 MG/DL
CALCIUM SERPL-MCNC: 10.4 MG/DL
CHLORIDE SERPL-SCNC: 104 MMOL/L
CO2 SERPL-SCNC: 20 MMOL/L
COLOR FLD: YELLOW
CREAT SERPL-MCNC: 4.1 MG/DL
CYCLOSPORINE BLD LC/MS/MS-MCNC: 222 NG/ML
DIFFERENTIAL METHOD: ABNORMAL
EOSINOPHIL # BLD AUTO: 0.2 K/UL
EOSINOPHIL NFR BLD: 4.1 %
ERYTHROCYTE [DISTWIDTH] IN BLOOD BY AUTOMATED COUNT: 15.7 %
EST. GFR  (AFRICAN AMERICAN): 21.4 ML/MIN/1.73 M^2
EST. GFR  (NON AFRICAN AMERICAN): 18.5 ML/MIN/1.73 M^2
GLUCOSE SERPL-MCNC: 87 MG/DL
HCT VFR BLD AUTO: 22.7 %
HGB BLD-MCNC: 7.5 G/DL
IMM GRANULOCYTES # BLD AUTO: 0.03 K/UL
IMM GRANULOCYTES NFR BLD AUTO: 0.6 %
LDH FLD L TO P-CCNC: 194 U/L
LYMPHOCYTES # BLD AUTO: 0.9 K/UL
LYMPHOCYTES NFR BLD: 15.8 %
LYMPHOCYTES NFR FLD MANUAL: 8 %
MAGNESIUM SERPL-MCNC: 1.9 MG/DL
MCH RBC QN AUTO: 31.5 PG
MCHC RBC AUTO-ENTMCNC: 33 G/DL
MCV RBC AUTO: 95 FL
MONOCYTES # BLD AUTO: 0.7 K/UL
MONOCYTES NFR BLD: 12.6 %
MONOS+MACROS NFR FLD MANUAL: 58 %
NEUTROPHILS # BLD AUTO: 3.6 K/UL
NEUTROPHILS NFR BLD: 66.5 %
NEUTROPHILS NFR FLD MANUAL: 34 %
NRBC BLD-RTO: 0 /100 WBC
PHOSPHATE SERPL-MCNC: 3 MG/DL
PLATELET # BLD AUTO: 164 K/UL
PMV BLD AUTO: 10.3 FL
POTASSIUM SERPL-SCNC: 4 MMOL/L
PROT FLD-MCNC: 2.9 G/DL
PROT SERPL-MCNC: 5.7 G/DL
RBC # BLD AUTO: 2.38 M/UL
SODIUM SERPL-SCNC: 137 MMOL/L
SPECIMEN SOURCE: NORMAL
VANCOMYCIN SERPL-MCNC: 17.4 UG/ML
WBC # BLD AUTO: 5.39 K/UL
WBC # FLD: 130 /CU MM

## 2018-01-31 PROCEDURE — 80202 ASSAY OF VANCOMYCIN: CPT

## 2018-01-31 PROCEDURE — 89051 BODY FLUID CELL COUNT: CPT

## 2018-01-31 PROCEDURE — 80053 COMPREHEN METABOLIC PANEL: CPT

## 2018-01-31 PROCEDURE — 20600001 HC STEP DOWN PRIVATE ROOM

## 2018-01-31 PROCEDURE — 90935 HEMODIALYSIS ONE EVALUATION: CPT | Mod: ,,, | Performed by: INTERNAL MEDICINE

## 2018-01-31 PROCEDURE — 99223 1ST HOSP IP/OBS HIGH 75: CPT | Mod: ,,, | Performed by: INTERNAL MEDICINE

## 2018-01-31 PROCEDURE — 63600175 PHARM REV CODE 636 W HCPCS: Performed by: PHYSICIAN ASSISTANT

## 2018-01-31 PROCEDURE — 25000003 PHARM REV CODE 250: Performed by: PHYSICIAN ASSISTANT

## 2018-01-31 PROCEDURE — 80158 DRUG ASSAY CYCLOSPORINE: CPT

## 2018-01-31 PROCEDURE — 87205 SMEAR GRAM STAIN: CPT

## 2018-01-31 PROCEDURE — 85025 COMPLETE CBC W/AUTO DIFF WBC: CPT

## 2018-01-31 PROCEDURE — 63600175 PHARM REV CODE 636 W HCPCS: Performed by: SURGERY

## 2018-01-31 PROCEDURE — 87075 CULTR BACTERIA EXCEPT BLOOD: CPT

## 2018-01-31 PROCEDURE — 25000003 PHARM REV CODE 250: Performed by: NURSE PRACTITIONER

## 2018-01-31 PROCEDURE — 87070 CULTURE OTHR SPECIMN AEROBIC: CPT

## 2018-01-31 PROCEDURE — 90935 HEMODIALYSIS ONE EVALUATION: CPT

## 2018-01-31 PROCEDURE — 63600175 PHARM REV CODE 636 W HCPCS: Performed by: NURSE PRACTITIONER

## 2018-01-31 PROCEDURE — 83735 ASSAY OF MAGNESIUM: CPT

## 2018-01-31 PROCEDURE — 84157 ASSAY OF PROTEIN OTHER: CPT

## 2018-01-31 PROCEDURE — 96372 THER/PROPH/DIAG INJ SC/IM: CPT

## 2018-01-31 PROCEDURE — 63600175 PHARM REV CODE 636 W HCPCS: Performed by: HOSPITALIST

## 2018-01-31 PROCEDURE — 83615 LACTATE (LD) (LDH) ENZYME: CPT

## 2018-01-31 PROCEDURE — 84100 ASSAY OF PHOSPHORUS: CPT

## 2018-01-31 PROCEDURE — 87102 FUNGUS ISOLATION CULTURE: CPT

## 2018-01-31 PROCEDURE — 63600175 PHARM REV CODE 636 W HCPCS: Performed by: RADIOLOGY

## 2018-01-31 RX ORDER — MIDAZOLAM HYDROCHLORIDE 1 MG/ML
1 INJECTION, SOLUTION INTRAMUSCULAR; INTRAVENOUS ONCE
Status: DISCONTINUED | OUTPATIENT
Start: 2018-01-31 | End: 2018-01-31

## 2018-01-31 RX ORDER — MIDAZOLAM HYDROCHLORIDE 1 MG/ML
2 INJECTION, SOLUTION INTRAMUSCULAR; INTRAVENOUS ONCE
Status: DISCONTINUED | OUTPATIENT
Start: 2018-01-31 | End: 2018-01-31

## 2018-01-31 RX ORDER — MIDAZOLAM HYDROCHLORIDE 1 MG/ML
2 INJECTION, SOLUTION INTRAMUSCULAR; INTRAVENOUS ONCE
Status: COMPLETED | OUTPATIENT
Start: 2018-01-31 | End: 2018-01-31

## 2018-01-31 RX ORDER — HEPARIN SODIUM 5000 [USP'U]/ML
5000 INJECTION, SOLUTION INTRAVENOUS; SUBCUTANEOUS EVERY 8 HOURS
Status: DISCONTINUED | OUTPATIENT
Start: 2018-01-31 | End: 2018-03-08

## 2018-01-31 RX ORDER — CEFEPIME HYDROCHLORIDE 1 G/1
1 INJECTION, POWDER, FOR SOLUTION INTRAMUSCULAR; INTRAVENOUS
Status: DISCONTINUED | OUTPATIENT
Start: 2018-01-31 | End: 2018-01-31

## 2018-01-31 RX ADMIN — ONDANSETRON HYDROCHLORIDE 4 MG: 2 INJECTION, SOLUTION INTRAMUSCULAR; INTRAVENOUS at 08:01

## 2018-01-31 RX ADMIN — ACETAMINOPHEN 650 MG: 325 TABLET, FILM COATED ORAL at 10:01

## 2018-01-31 RX ADMIN — PROCHLORPERAZINE EDISYLATE 10 MG: 5 INJECTION INTRAMUSCULAR; INTRAVENOUS at 01:01

## 2018-01-31 RX ADMIN — METOCLOPRAMIDE HYDROCHLORIDE 5 MG: 5 TABLET ORAL at 08:01

## 2018-01-31 RX ADMIN — ACETAMINOPHEN 650 MG: 325 TABLET, FILM COATED ORAL at 03:01

## 2018-01-31 RX ADMIN — LEVOTHYROXINE SODIUM 75 MCG: 75 TABLET ORAL at 06:01

## 2018-01-31 RX ADMIN — LEVETIRACETAM 500 MG: 500 TABLET ORAL at 03:01

## 2018-01-31 RX ADMIN — POLYETHYLENE GLYCOL 3350 17 G: 17 POWDER, FOR SOLUTION ORAL at 08:01

## 2018-01-31 RX ADMIN — LEVETIRACETAM 500 MG: 500 TABLET ORAL at 08:01

## 2018-01-31 RX ADMIN — ATOVAQUONE 1500 MG: 750 SUSPENSION ORAL at 06:01

## 2018-01-31 RX ADMIN — ERYTHROPOIETIN 10000 UNITS: 10000 INJECTION, SOLUTION INTRAVENOUS; SUBCUTANEOUS at 11:01

## 2018-01-31 RX ADMIN — SODIUM CHLORIDE: 0.9 INJECTION, SOLUTION INTRAVENOUS at 08:01

## 2018-01-31 RX ADMIN — OXYCODONE HYDROCHLORIDE 5 MG: 5 TABLET ORAL at 03:01

## 2018-01-31 RX ADMIN — CYCLOSPORINE 125 MG: 100 CAPSULE, LIQUID FILLED ORAL at 05:01

## 2018-01-31 RX ADMIN — VANCOMYCIN 1000 MG: 1 INJECTION, SOLUTION INTRAVENOUS at 03:01

## 2018-01-31 RX ADMIN — OMEPRAZOLE 40 MG: 40 CAPSULE, DELAYED RELEASE ORAL at 06:01

## 2018-01-31 RX ADMIN — OMEPRAZOLE 40 MG: 40 CAPSULE, DELAYED RELEASE ORAL at 05:01

## 2018-01-31 RX ADMIN — METOCLOPRAMIDE HYDROCHLORIDE 5 MG: 5 TABLET ORAL at 06:01

## 2018-01-31 RX ADMIN — OXYCODONE HYDROCHLORIDE 5 MG: 5 TABLET ORAL at 07:01

## 2018-01-31 RX ADMIN — URSODIOL 300 MG: 300 CAPSULE ORAL at 08:01

## 2018-01-31 RX ADMIN — METOCLOPRAMIDE HYDROCHLORIDE 5 MG: 5 TABLET ORAL at 05:01

## 2018-01-31 RX ADMIN — DOCUSATE SODIUM 100 MG: 100 CAPSULE, LIQUID FILLED ORAL at 08:01

## 2018-01-31 RX ADMIN — PIPERACILLIN AND TAZOBACTAM 2.25 G: 4; .5 INJECTION, POWDER, LYOPHILIZED, FOR SOLUTION INTRAVENOUS; PARENTERAL at 10:01

## 2018-01-31 RX ADMIN — OXYCODONE HYDROCHLORIDE 5 MG: 5 TABLET ORAL at 04:01

## 2018-01-31 RX ADMIN — OXYCODONE HYDROCHLORIDE 5 MG: 5 TABLET ORAL at 08:01

## 2018-01-31 RX ADMIN — HEPARIN SODIUM 5000 UNITS: 5000 INJECTION, SOLUTION INTRAVENOUS; SUBCUTANEOUS at 08:01

## 2018-01-31 RX ADMIN — CEFEPIME 1 G: 1 INJECTION, POWDER, FOR SOLUTION INTRAMUSCULAR; INTRAVENOUS at 03:01

## 2018-01-31 RX ADMIN — PROCHLORPERAZINE EDISYLATE 10 MG: 5 INJECTION INTRAMUSCULAR; INTRAVENOUS at 06:01

## 2018-01-31 RX ADMIN — CYCLOSPORINE 125 MG: 100 CAPSULE, LIQUID FILLED ORAL at 06:01

## 2018-01-31 RX ADMIN — HEPARIN SODIUM 1000 UNITS: 1000 INJECTION, SOLUTION INTRAVENOUS; SUBCUTANEOUS at 11:01

## 2018-01-31 RX ADMIN — MIDAZOLAM HYDROCHLORIDE 2 MG: 1 INJECTION, SOLUTION INTRAMUSCULAR; INTRAVENOUS at 01:01

## 2018-01-31 RX ADMIN — URSODIOL 300 MG: 300 CAPSULE ORAL at 06:01

## 2018-01-31 RX ADMIN — ONDANSETRON HYDROCHLORIDE 4 MG: 2 INJECTION, SOLUTION INTRAMUSCULAR; INTRAVENOUS at 06:01

## 2018-01-31 NOTE — PROGRESS NOTES
Pt arrived via stretcher.  Placed on cardiac monitor and b/p check every 15 minutes.  righrt IJ Dialysis access prep with prevantic swab.[email or web address]  Hemodialysis started per orders.

## 2018-01-31 NOTE — ASSESSMENT & PLAN NOTE
- Pt with tmax overnight 102.5   - Started on vanc/cefepime for ERCP yesterday. Plan to continue for now.   - Blood cultures sent, prelim NGTD.   - Pt went for thoracentesis today, cell counts pending.

## 2018-01-31 NOTE — PROGRESS NOTES
Pt temperature 102.5 F and HR elevated- tylenol given an hour prior, notified Dr. Qi Mcguire DNP- nonpharmological interventions taken (uncovered patient, adjusted thermostat in room, applied cool wet rag) re checked patient at 2145= temp 99.8

## 2018-01-31 NOTE — PROCEDURES
Radiology Post-Procedure Note    Pre Op Diagnosis: Right pleural effusion  Post Op Diagnosis: Same    Procedure: Thoracentesis    Procedure performed by: Nikita Bains MD    Written Informed Consent Obtained: Yes  Specimen Removed: YES 20 cc serous fluid  Estimated Blood Loss: less than 50    Findings:   Successful thoracentesis. Small hematoma at puncture site.  Pt was given IV versed for anxiolysis and monitored by a registered nurse throughout the procedure.    Patient tolerated procedure well.    Elvia Coronel MD  Resident  Department of Radiology  Pager: [phone]

## 2018-01-31 NOTE — ASSESSMENT & PLAN NOTE
- tbili rising  - Pt went for ERCP 1/30 with stones and sludge, stents placed  - Will cont to monitor.

## 2018-01-31 NOTE — SIGNIFICANT EVENT
Artificial Intelligence Notificaton      Admit Date: 2018  LOS: 20  Code Status: Full Code   Date of Consult: 2018  : 1990  Age: 27 y.o.  Weight:   Wt Readings from Last 1 Encounters:   18 66.8 kg (147 lb 4.3 oz)     Sex: male  Bed: [facility]/8086 A:   MRN: [id number]  Attending Physician: Nathanael Medina MD  Primary Service: [facility] LIVER TRANSPLANT           Mr. Diana is lying in bed awake, conversant without any acute signs of distress. His mother is at bedside.        Vital Signs (Most Recent):  Temp: (!) 102.4 °F (39.1 °C) (18 1623)  Pulse: (!) 115 (18 1509)  Resp: 18 (18 1509)  BP: 130/71 (18 1509)  SpO2: 97 % (18 1509) Vital Signs (24h Range):  Temp:  [99.8 °F (37.7 °C)-102.5 °F (39.2 °C)] 102.4 °F (39.1 °C)  Pulse:  [] 115  Resp:  [16-19] 18  SpO2:  [93 %-97 %] 97 %  BP: (116-148)/(67-90) 130/71         This is an  Artificial Intelligence Notification.     Artificial Intelligence alert discussed with Primary team:  Jenna Oppenheimer PA-C.  Critical Care consult not requested at this time.     Mr. Diana had temperature 102.4 this afternoon.  Primary team evaluating with blood cultures, thoracentesis.  On empiric antibiotics.    Please place a Critical Care consult if evaluation/consultation is needed  The Critical Care Fellow can be reached at [phone]    M. Jameson SHERMAN, [facility]-BC  Critical Care Medicine  [phone]

## 2018-01-31 NOTE — PROGRESS NOTES
Dialysis complete.  Blood returned.     Right IJ CVC flushed * 2 with    Saline and heparing   Locked with cap and tape.  No s/s infection at cvc site.   Pt tolerated hemodialysis without diff.  Pt ran  3  Hrs on hemodialysis machine.   Took off 1000  Ml net volume.      Used F-160 dialyzer.

## 2018-01-31 NOTE — TREATMENT PLAN
AES Follow-up Note    Patient had ERCP done yesterday, spiking intermittent fevers, had dialysis and thoracentesis done today, fluid studies from thoracentesis pending.  Patient complains of lower abdominal cramping pain.  Also complains of pain at thoracentesis site.  Amylase 18 and lipase 12 post procedure yesterday.      Physical Exam   Constitutional: He is oriented to person, place, and time. He appears distressed.   Chronically ill appearing young white male   HENT:   Head: Normocephalic and atraumatic.   Eyes: Scleral icterus is present.   Neck: Normal range of motion. Neck supple.   Cardiovascular: Exam reveals no friction rub.    No murmur heard.  Tachycardia    Pulmonary/Chest: No respiratory distress.   Coarse bilateral breath sounds    Abdominal: Soft. There is tenderness in the suprapubic area. There is no rigidity, no rebound and no guarding.   Neurological: He is alert and oriented to person, place, and time.   Skin: Skin is warm and dry. He is not diaphoretic.   + jaundice    Psychiatric: Affect and judgment normal.     ERCP 1/30/18:  Impression:   - One stent from the biliary tree was seen in the                         major papilla.                        - One stent was removed from the biliary tree.                        - Choledocholithiasis was found. Complete removal                         was accomplished by balloon extraction. No                         cholangiographic evidence for stricture at                         anastomosis.                        - Two plastic stents were placed into the common                         bile duct (given quick repeat occurence of                         stone/sludge since last ERCP, stents placed to                         optimize drainage should this occur again).  Recommendation:                            - Repeat ERCP in 6-8 weeks to remove stents                        - Broad spectrum antibiotics     Thank you for allowing us to participate in  the care of Jhonny Diana. Please do not hesitate to call us with questions.    Shen Sousa  Gastroenterology Fellow (PGY 6)

## 2018-01-31 NOTE — ASSESSMENT & PLAN NOTE
- h/o constipation, reports having regular BM's with bowel regimen  - vomiting episode x 1 at home. Anti-emetics ordered PRN.  - KUB repeated 1/14/18 given increased abdominal pain- mild, generalized bowel distention suggesting ileus.  Diet changed to clears as tolerated for bowel rest   - SHANE tube placed 1/16 for tube feeds. Not tolerating tube feeds, stopped 1/17/18.  - D/C shane 1/19, now in place as of 1/25.  - chronic constipation, Cont bowel regimen.   - GI consulted. See protein calorie malnutrition

## 2018-01-31 NOTE — H&P
Inpatient Radiology Pre-procedure Note    History of Present Illness:  Jhonny Diana is a 27 y.o. male who presents for right sided thoracentesis.    Admission H&P reviewed.  Past Medical History:   Diagnosis Date    Alcoholic hepatitis with ascites     Alcoholic hepatitis with ascites     History of hematemesis 9/28/2017    Hypertension     Renal disorder      Past Surgical History:   Procedure Laterality Date    APPENDECTOMY      ERCP      ESOPHAGOGASTRODUODENOSCOPY      LIVER TRANSPLANT         Review of Systems:   As documented in primary team H&P    Home Meds:   Prior to Admission medications    Medication Sig Start Date End Date Taking? Authorizing Provider   aspirin (ECOTRIN) 81 MG EC tablet Take 1 tablet (81 mg total) by mouth once daily. 11/8/17   Nathanael Medina MD   atovaquone (MEPRON) 750 mg/5 mL Susp Take 10 mLs (1,500 mg total) by mouth once daily. Stop on 4/17/18 10/19/17 4/17/18  Nathanael Medina MD   cycloSPORINE modified, NEORAL, 25 MG capsule Take 3 capsules (75 mg total) by mouth 2 (two) times daily. 1/9/18   Quiana Carter MD   docusate sodium (COLACE) 100 MG capsule Take 100 mg by mouth 3 (three) times daily as needed for Constipation.    Historical Provider, MD   ergocalciferol (ERGOCALCIFEROL) 50,000 unit Cap Take 1 capsule (50,000 Units total) by mouth every 7 days. 11/7/17   Nathanael Medina MD   levETIRAcetam (KEPPRA) 500 MG Tab Take 1 tablet (500 mg total) by mouth 2 (two) times daily. 11/7/17 11/7/18  Nathanael Medina MD   levothyroxine (SYNTHROID) 75 MCG tablet Take 1 tablet (75 mcg total) by mouth before breakfast. 12/21/17   Nathanael Medina MD   mirtazapine (REMERON) 7.5 MG Tab Take 1 tablet (7.5 mg total) by mouth every evening. 12/20/17   Nathanael Medina MD   multivitamin (THERAGRAN) tablet Take 1 tablet by mouth once daily. 11/7/17   Nathanael Medina MD   omeprazole (PRILOSEC) 40 MG capsule Take 1 capsule (40 mg total) by mouth before breakfast. 1/25/18 1/25/19  Nathanael Medina MD   ondansetron  (ZOFRAN-ODT) 8 MG TbDL Take 1 tablet (8 mg total) by mouth every 8 (eight) hours as needed (nausea). 1/10/18   Quiana Carter MD   oxyCODONE (ROXICODONE) 10 mg Tab immediate release tablet Take 0.5-1 tablets (5-10 mg total) by mouth every 6 (six) hours as needed for Pain. 1/10/18   Quiana Carter MD   pantoprazole (PROTONIX) 40 MG tablet Take 1 tablet (40 mg total) by mouth once daily. 11/8/17   Nathanael Medina MD   sodium bicarbonate 650 MG tablet Take 2 tablets (1,300 mg total) by mouth 2 (two) times daily. 11/30/17   Geronimo Vera PA-C   thiamine 100 MG tablet Take 1 tablet (100 mg total) by mouth once daily. 11/7/17   Nathanael Medina MD   ursodiol (ACTIGALL) 300 mg capsule Take 1 capsule (300 mg total) by mouth 2 (two) times daily. 12/20/17   Nathanael Medina MD     Scheduled Meds:    sodium chloride 0.9%   Intravenous Once    acetaminophen        atovaquone  1,500 mg Oral Daily    ceFEPime (MAXIPIME) IVPB  1 g Intravenous Q12H    cycloSPORINE modified  125 mg Oral BID    docusate sodium  100 mg Oral BID    epoetin maurisio (PROCRIT) injection  10,000 Units Intravenous Every Tues, Thurs, Sat    levETIRAcetam  500 mg Oral BID    levothyroxine  75 mcg Oral Before breakfast    metoclopramide HCl  5 mg Oral QID (AC & HS)    omeprazole  40 mg Oral BID AC    polyethylene glycol  17 g Oral BID    psyllium husk (aspartame)  3.4 g Oral BID    ursodiol  300 mg Oral BID     Continuous Infusions:   PRN Meds:sodium chloride, acetaminophen, bisacodyl, heparin (porcine), omnipaque, ondansetron, ondansetron, oxyCODONE, prochlorperazine, simethicone, sodium chloride 0.9%  Anticoagulants/Antiplatelets: no anticoagulation    Allergies:   Review of patient's allergies indicates:   Allergen Reactions    Bactrim [sulfamethoxazole-trimethoprim] Other (See Comments)     Mookie Trell Syndrome     Sedation Hx: have not been any systemic reactions    Labs:  No results for input(s): INR in the last 168 hours.    Invalid  input(s):  PT,  PTT    Recent Labs  Lab 01/30/18 0448   WBC 4.64   HGB 6.5*   HCT 19.8*   MCV 96         Recent Labs  Lab 01/30/18 0448   GLU 63*      K 4.0      CO2 25   BUN 13   CREATININE 2.8*   CALCIUM 9.5   MG 1.9   ALT 6*   AST 26   ALBUMIN 2.0*   BILITOT 4.2*         Vitals:  Temp: (!) 102 °F (38.9 °C) (01/30/18 1830)  Pulse: 96 (01/30/18 1538)  Resp: 16 (01/30/18 1538)  BP: (!) 141/83 (01/30/18 1538)  SpO2: 97 % (01/30/18 1538)     Physical Exam:  ASA: 2  Mallampati: 2    General: no acute distress  Mental Status: alert and oriented to person, place and time  HEENT: normocephalic, atraumatic  Chest: unlabored breathing  Heart: regular heart rate  Abdomen: nondistended  Extremity: moves all extremities    Plan: right sided thora  Sedation Plan: local + versed.     Sri Gutierres M.D. 7:29 PM 1/30/2018

## 2018-01-31 NOTE — SUBJECTIVE & OBJECTIVE
Scheduled Meds:   atovaquone  1,500 mg Oral Daily    ceFEPime (MAXIPIME) IVPB  1 g Intravenous Q12H    cycloSPORINE modified  125 mg Oral BID    docusate sodium  100 mg Oral BID    epoetin maurisio (PROCRIT) injection  10,000 Units Intravenous Every Tues, Thurs, Sat    heparin (porcine)  5,000 Units Subcutaneous Q8H    levETIRAcetam  500 mg Oral BID    levothyroxine  75 mcg Oral Before breakfast    metoclopramide HCl  5 mg Oral QID (AC & HS)    omeprazole  40 mg Oral BID AC    polyethylene glycol  17 g Oral BID    psyllium husk (aspartame)  3.4 g Oral BID    ursodiol  300 mg Oral BID    vancomycin (VANCOCIN) IVPB  1,000 mg Intravenous Once     Continuous Infusions:  PRN Meds:sodium chloride, acetaminophen, bisacodyl, heparin (porcine), omnipaque, ondansetron, ondansetron, oxyCODONE, prochlorperazine, simethicone, sodium chloride 0.9%    Review of Systems   Constitutional: Positive for activity change, appetite change, chills, fatigue and fever.   HENT: Negative.  Negative for congestion and facial swelling.    Eyes: Negative for pain, discharge and visual disturbance.   Respiratory: Negative for cough, shortness of breath and wheezing.    Cardiovascular: Negative for chest pain and leg swelling.   Gastrointestinal: Positive for abdominal distention, abdominal pain and constipation. Negative for nausea and vomiting.   Genitourinary: Positive for decreased urine volume. Negative for difficulty urinating.        Anuric   Musculoskeletal: Negative for arthralgias, back pain and myalgias.   Skin: Positive for color change and wound. Negative for rash.   Allergic/Immunologic: Positive for immunocompromised state.   Neurological: Negative for dizziness, seizures, weakness and headaches.   Psychiatric/Behavioral: Positive for decreased concentration and dysphoric mood. Negative for confusion. The patient is not nervous/anxious.    All other systems reviewed and are negative.    Objective:     Vital Signs (Most  Recent):  Temp: (!) 102.1 °F (38.9 °C) (01/31/18 1509)  Pulse: (!) 115 (01/31/18 1509)  Resp: 18 (01/31/18 1509)  BP: 130/71 (01/31/18 1509)  SpO2: 97 % (01/31/18 1509) Vital Signs (24h Range):  Temp:  [99.8 °F (37.7 °C)-102.5 °F (39.2 °C)] 102.1 °F (38.9 °C)  Pulse:  [] 115  Resp:  [16-19] 18  SpO2:  [93 %-97 %] 97 %  BP: (116-148)/(67-90) 130/71     Weight: 66.8 kg (147 lb 4.3 oz)  Body mass index is 23.77 kg/m².    Intake/Output - Last 3 Shifts       01/29 0700 - 01/30 0659 01/30 0700 - 01/31 0659 01/31 0700 - 02/01 0659    P.O. 1040 90     I.V. (mL/kg)  400 (6)     Blood  306.7     Other 600      NG/GT  30     IV Piggyback  250     Total Intake(mL/kg) 1640 (24.2) 1076.7 (16.1)     Urine (mL/kg/hr) 0 (0) 0 (0)     Emesis/NG output  0 (0)     Other 600 (0.4) 0 (0) 1400 (2.5)    Stool 0 (0) 0 (0)     Blood  0 (0)     Total Output 600 0 1400    Net +1040 +1076.7 -1400           Urine Occurrence 0 x 0 x     Stool Occurrence 0 x 2 x     Emesis Occurrence 0 x 0 x           Physical Exam   Constitutional: He is oriented to person, place, and time. He appears well-developed. No distress.   Temporal and distal extremity muscle wasting   HENT:   Head: Normocephalic and atraumatic.   Mouth/Throat: No oropharyngeal exudate.   dylan tube   Eyes: EOM are normal. Pupils are equal, round, and reactive to light. Scleral icterus is present.   Neck: Normal range of motion. Neck supple. No JVD present. No thyromegaly present.   Cardiovascular: Normal rate, regular rhythm, normal heart sounds and intact distal pulses.    No murmur heard.  Pulmonary/Chest: Effort normal. No respiratory distress. He has decreased breath sounds in the right middle field, the right lower field and the left lower field. He has no wheezes. He exhibits no tenderness.   Diminished to RLL   Abdominal: Soft. Bowel sounds are normal. He exhibits ascites. He exhibits no distension. There is tenderness (mild). There is no rebound and no guarding.   Dressing  to paul.  Wd vac removed 1/12/18  Dependent edema present R flank; surrounding erythema   Musculoskeletal: Normal range of motion. He exhibits edema (2+ LE edema). He exhibits no tenderness.   Neurological: He is alert and oriented to person, place, and time. He has normal reflexes.   Skin: Skin is warm and dry. Capillary refill takes 2 to 3 seconds. He is not diaphoretic. No erythema.   jaundice   Psychiatric: He has a normal mood and affect. His behavior is normal. Judgment and thought content normal. His mood appears not anxious.   Depressed mood   Nursing note and vitals reviewed.      Laboratory:  Immunosuppressants         Stop Route Frequency     cycloSPORINE modified capsule 125 mg      -- Oral 2 times daily        CBC:   Recent Labs  Lab 01/31/18  0423   WBC 5.39   RBC 2.38*   HGB 7.5*   HCT 22.7*      MCV 95   MCH 31.5*   MCHC 33.0     CMP:   Recent Labs  Lab 01/31/18  0423   GLU 87   CALCIUM 10.4   ALBUMIN 2.0*   PROT 5.7*      K 4.0   CO2 20*      BUN 20   CREATININE 4.1*   ALKPHOS 165*   ALT 7*   AST 28   BILITOT 5.1*     Labs within the past 24 hours have been reviewed.    Diagnostic Results:  I have personally reviewed all pertinent imaging studies.

## 2018-01-31 NOTE — PROGRESS NOTES
Follow up on liver transplant incision   Wound resolving well under mepilex border dressing.      01/30/18 1800       Incision/Site 10/30/17 0911 abdomen transverse   Date First Assessed/Time First Assessed: 10/30/17 0911   Present Prior to Hospital Arrival?: Yes  Location: abdomen  Orientation: transverse   Incision WDL ex   Dressing Appearance Intact;Clean;Dry   Drainage Amount Scant   Drainage Characteristics/Odor Yellow;Creamy   Appearance Red;Moist   Red (%), Wound Tissue Color 100 %   Periwound Area Intact   Wound Edges Open   Wound Length (cm) 0.7   Wound Width (cm) 9   Depth (cm) 0.1   Care Cleansed with:;Sterile normal saline   Dressing Applied;Foam     Continue to follow  Zoë Murphy RN CWON  [id number]

## 2018-01-31 NOTE — PROGRESS NOTES
[facility]  Liver Transplant  Progress Note    Patient Name: Jhonny Diana  MRN: [id number]  Admission Date: 2018  Hospital Length of Stay: 20 days  Code Status: Full Code  Primary Care Provider: Primary Doctor No  Post-Operative Day: 104    ORGAN:   LIVER  Disease Etiology: Acute Alcoholic Hepatitis  Donor Type:    - Brain Death  CDC High Risk:   No  Donor CMV Status:   Donor CMV Status: Positive  Donor HBcAB:   Negative  Donor HCV Status:   Negative  Whole or Partial: Whole Liver  Biliary Anastomosis: End to End  Arterial Anatomy: Standard  Subjective:     History of Present Illness:  Jhonny Diana is a 26 y/o male with past medical history of alcoholic cirrhosis.  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 Found to have peritonitis in November (WBC 5000, 75% PNM) neg for bile leak. He was treated initially with vanc/cefepime. Repeat cell counts  with some improvement (WBC 1400, 45% PNM). He has undergone multiple paracenteses as well as abscess drainage of perihepatic fluid collections and treated with antimicrobial therapy but no positive cultures. Of note, biliary stricture also identified and ERCP performed on 2017 with sphincterotomy and biliary stent placed. Liver tests still have not normalized despite intervention, bilirubin and AP remain elevated. Other pertinent PMH current wound vac in place 2/2 wound infection, malnutrition requiring TPN for short course and ongoing hypoalbuminemia, and seizure activity while on prograf and has since been switched to cyclosporine without reoccurrence.  He presented to the ER for fever, abdominal pain, and N/V. He reports fever (103) for 1  day prior. Overnight, he developed N/V, reports small amount of green emesis with new left sided pain. He also endorses worsening SOB with exertion. He was scheduled as an outpatient for follow up paracentesis and IR drainage of fluid collection. CXR in ER shows large pleural effusion with subsegmental atelectasis. Infectious work up initiated in ER. His ANC is 900. Broad spectrum antibiotics initiated in ED. He denies chest pain, palpitations, diarrhea, constipation, or back pain. Denies any sick contacts.    Hospital Course:  Thoracentesis (1.2L off), Paracentesis (1.6L off), and IR drainage of fluid collection 1/11, all fluids negative for infection. ID consulted. Broad spectrum antibiotics d/c'd 1/15. Chronically malnourished with poor PO intake, prealbumin 7. Shane tube placed 1/15 for tube feedings.     ERCP 1/17 with sludge and a biliary stone which was removed and stent exchanged.  Remained with n/v; therefore, TF remained on hold and TPN continued.   Pt with fever s/p ERCP on 1/17 which continued until 1/19.  Vanc/cefepime restarted.  Blood cx 1/17 and 1/18 NGTD.  ID reconsulted.  Fungal markers sent.  CT C/A/P obtained.  With large R pleural effusion and ascites- both drained 1/19 and negative for infx per cell count. Pt afebrile 1/20. Liver biopsy 1/23 - without rejection.  EGD 1/25 - unremarkable for source of GI symptoms.  Resume diet along with other GI recommendations. SHANE tube placed for tube feeds.      Interval History:  Pt with fever tmax 102.5 overnight. On vanc and cefepime. Pt went for ERCP yesterday revealing stones and sludge. Stents placed. Tbili elevated again this morning, will watch trend. Amylase/lipase checked overnight, neg for post ERCP pancreatitis. Pt still with generalized abdominal pain although not worsening. Pt went for HD this morning and thoracentesis this afternoon with cell counts. In addition pt received 1 U PRBC yesterday, H/H stable this morning.     Scheduled Meds:    atovaquone  1,500 mg Oral Daily    ceFEPime (MAXIPIME) IVPB  1 g Intravenous Q12H    cycloSPORINE modified  125 mg Oral BID    docusate sodium  100 mg Oral BID    epoetin maurisio (PROCRIT) injection  10,000 Units Intravenous Every Tues, Thurs, Sat    heparin (porcine)  5,000 Units Subcutaneous Q8H    levETIRAcetam  500 mg Oral BID    levothyroxine  75 mcg Oral Before breakfast    metoclopramide HCl  5 mg Oral QID (AC & HS)    omeprazole  40 mg Oral BID AC    polyethylene glycol  17 g Oral BID    psyllium husk (aspartame)  3.4 g Oral BID    ursodiol  300 mg Oral BID    vancomycin (VANCOCIN) IVPB  1,000 mg Intravenous Once     Continuous Infusions:  PRN Meds:sodium chloride, acetaminophen, bisacodyl, heparin (porcine), omnipaque, ondansetron, ondansetron, oxyCODONE, prochlorperazine, simethicone, sodium chloride 0.9%    Review of Systems   Constitutional: Positive for activity change, appetite change, chills, fatigue and fever.   HENT: Negative.  Negative for congestion and facial swelling.    Eyes: Negative for pain, discharge and visual disturbance.   Respiratory: Negative for cough, shortness of breath and wheezing.    Cardiovascular: Negative for chest pain and leg swelling.   Gastrointestinal: Positive for abdominal distention, abdominal pain and constipation. Negative for nausea and vomiting.   Genitourinary: Positive for decreased urine volume. Negative for difficulty urinating.        Anuric   Musculoskeletal: Negative for arthralgias, back pain and myalgias.   Skin: Positive for color change and wound. Negative for rash.   Allergic/Immunologic: Positive for immunocompromised state.   Neurological: Negative for dizziness, seizures, weakness and headaches.   Psychiatric/Behavioral: Positive for decreased concentration and dysphoric mood. Negative for confusion. The patient is not nervous/anxious.    All other systems reviewed and are negative.    Objective:     Vital Signs (Most Recent):  Temp: (!)  102.1 °F (38.9 °C) (01/31/18 1509)  Pulse: (!) 115 (01/31/18 1509)  Resp: 18 (01/31/18 1509)  BP: 130/71 (01/31/18 1509)  SpO2: 97 % (01/31/18 1509) Vital Signs (24h Range):  Temp:  [99.8 °F (37.7 °C)-102.5 °F (39.2 °C)] 102.1 °F (38.9 °C)  Pulse:  [] 115  Resp:  [16-19] 18  SpO2:  [93 %-97 %] 97 %  BP: (116-148)/(67-90) 130/71     Weight: 66.8 kg (147 lb 4.3 oz)  Body mass index is 23.77 kg/m².    Intake/Output - Last 3 Shifts       01/29 0700 - 01/30 0659 01/30 0700 - 01/31 0659 01/31 0700 - 02/01 0659    P.O. 1040 90     I.V. (mL/kg)  400 (6)     Blood  306.7     Other 600      NG/GT  30     IV Piggyback  250     Total Intake(mL/kg) 1640 (24.2) 1076.7 (16.1)     Urine (mL/kg/hr) 0 (0) 0 (0)     Emesis/NG output  0 (0)     Other 600 (0.4) 0 (0) 1400 (2.5)    Stool 0 (0) 0 (0)     Blood  0 (0)     Total Output 600 0 1400    Net +1040 +1076.7 -1400           Urine Occurrence 0 x 0 x     Stool Occurrence 0 x 2 x     Emesis Occurrence 0 x 0 x           Physical Exam   Constitutional: He is oriented to person, place, and time. He appears well-developed. No distress.   Temporal and distal extremity muscle wasting   HENT:   Head: Normocephalic and atraumatic.   Mouth/Throat: No oropharyngeal exudate.   dylan tube   Eyes: EOM are normal. Pupils are equal, round, and reactive to light. Scleral icterus is present.   Neck: Normal range of motion. Neck supple. No JVD present. No thyromegaly present.   Cardiovascular: Normal rate, regular rhythm, normal heart sounds and intact distal pulses.    No murmur heard.  Pulmonary/Chest: Effort normal. No respiratory distress. He has decreased breath sounds in the right middle field, the right lower field and the left lower field. He has no wheezes. He exhibits no tenderness.   Diminished to RLL   Abdominal: Soft. Bowel sounds are normal. He exhibits ascites. He exhibits no distension. There is tenderness (mild). There is no rebound and no guarding.   Dressing to chevron.  Wd vac  removed 1/12/18  Dependent edema present R flank; surrounding erythema   Musculoskeletal: Normal range of motion. He exhibits edema (2+ LE edema). He exhibits no tenderness.   Neurological: He is alert and oriented to person, place, and time. He has normal reflexes.   Skin: Skin is warm and dry. Capillary refill takes 2 to 3 seconds. He is not diaphoretic. No erythema.   jaundice   Psychiatric: He has a normal mood and affect. His behavior is normal. Judgment and thought content normal. His mood appears not anxious.   Depressed mood   Nursing note and vitals reviewed.      Laboratory:  Immunosuppressants         Stop Route Frequency     cycloSPORINE modified capsule 125 mg      -- Oral 2 times daily        CBC:   Recent Labs  Lab 01/31/18  0423   WBC 5.39   RBC 2.38*   HGB 7.5*   HCT 22.7*      MCV 95   MCH 31.5*   MCHC 33.0     CMP:   Recent Labs  Lab 01/31/18  0423   GLU 87   CALCIUM 10.4   ALBUMIN 2.0*   PROT 5.7*      K 4.0   CO2 20*      BUN 20   CREATININE 4.1*   ALKPHOS 165*   ALT 7*   AST 28   BILITOT 5.1*     Labs within the past 24 hours have been reviewed.    Diagnostic Results:  I have personally reviewed all pertinent imaging studies.    Assessment/Plan:     Fever    - Pt with tmax overnight 102.5   - Started on vanc/cefepime for ERCP yesterday. Plan to continue for now.   - Blood cultures sent, prelim NGTD.   - Pt went for thoracentesis today, cell counts pending.             Recurrent pleural effusion on right    - CXR in ER with large right pleural effusion with subjective c/o worsening SOB  - thoracentesis completed- 1200 ml removed.  SOB with significant improvement.   - Cell count reviewed and negative for infection.   - resp even and non labored.  O2 sat 94-98%.  - Repeat thora 1/19 negative for infection per cell count  - Reaccumulating fluid on xray 1/21.  - thoracentesis today, cell counts pending.        Biliary stricture of transplanted liver    - tbili rising  - Pt went  for ERCP 1/30 with stones and sludge, stents placed  - Will cont to monitor.        Other ascites    - Paracentesis performed 1/11/18 with 1600 ml removed.    - fluid negative for infection.   - Repeat para 1/19 negative for infection per cell count  - no fluid seen for paracentesis on 1/23.        Acute renal failure with tubular necrosis    - HD resumed on previous admission. Now anuric and dialyzes M-W-F.  - Nephrology following.    - HD Sat since held Friday for thora/para.  - HD transitioned to TTS schedule.  - ktm consulted for possible kidney transplant         Constipation    - Enema ordered 1/20 and 1/21 with BM  - Encourage ambulation.  - decrease narcotics  - metamucil and miralax bid  - reglan qid            Liver transplanted    - Post op course complicated by fevers and hyperbilirubinemia.  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  - AST/ALT normal. Tbili/alkphos remain elevated.  - Liver US 1/8 showed 3.9 cm complex fluid collection anterior to right lobe and moderate nonspecific complex ascites inferior to transplant.   - IR placed drain 1/11/18, cell count negative for infection.  - PBS consulted. PBS not comfortable proceeding with EUS with liver biopsy given fever and possible infectious process going on. ERCP 1/17 with stone and sludge. Pt placed on Actigall - treated with 10 day course of abx.  ID now signed off.  - Bili remains elevated - liver u/s 1/28  - Liver biopsy 1/23 without rejection.  - consulted PBS for ERCP - Choledocholithiasis was found along with sludge, stents placed 1/30          Nausea and vomiting    - h/o constipation, reports having regular BM's with bowel regimen  - vomiting episode x 1 at home. Anti-emetics ordered PRN.  - KUB repeated 1/14/18 given increased abdominal pain- mild, generalized bowel distention suggesting ileus.  Diet changed to clears as tolerated for bowel rest   - SHANE tube placed 1/16 for tube feeds. Not tolerating tube feeds,  stopped 1/17/18.  - D/C shane 1/19, now in place as of 1/25.  - chronic constipation, Cont bowel regimen.   - GI consulted. See protein calorie malnutrition          Anemia of chronic disease    - PRBC x 1 1/19  - PRBC x 1 1/21  - Continue daily CBC.  - H&H trending down   - blood transfusion today        Long-term use of immunosuppressant medication    - Maintenance IS with cyclosporine. MMF on hold for infections and neutropenia. Continue to check cyclosporine level daily. Assess for toxicity and adjust level as needed.        Severe protein-calorie malnutrition    - Poor PO intake since transplant requiring short course of TPN during previous hospital stay.   - albumin remains decreased but appetite slowly improving per pt.   - Dietary consulted. Will need to closely monitor PO intake.   - supplements also ordered.   - SHANE with tube feeding start 1/16. Stopped 1/17/18, was not tolerating. [place] removed 1/20.  - prealbumin 7 on 1/15.  - TPN started 1/16/18.   - Pt able to eat minimally 1/21 and 1/22 which is an improvement.  - gi consulted - EGD reviewed, increased ppi bid, miralax bid, and metamucil bid, decreased narcotics  - continue reglan, placed SHANE tube with tube feeds          Delayed surgical wound healing    - wd vac removed 1/12/18.  Wound healing well. Aquacel AG and Mepilex border dressing applied via wound care recs.           At risk for opportunistic infections    - Hold Valcyte as WBC low.  CMV PCR 1/11, 1/18 undetected.  - bactrim held.          Prophylactic immunotherapy    - See long term use of immunosuppression.             VTE Risk Mitigation         Ordered     heparin (porcine) injection 5,000 Units  Every 8 hours     Route:  Subcutaneous        01/31/18 0957     heparin (porcine) injection 1,000 Units  As needed (PRN)     Route:  Intra-Catheter        01/12/18 1017     Medium Risk of VTE  Once      01/11/18 0351     Place sequential compression device  Until discontinued      01/11/18 0351           The patients clinical status was discussed at multidisplinary rounds, involving transplant surgery, transplant medicine, pharmacy, nursing, nutrition, and social work    Discharge Planning:  Monitor kidney function --> may need outpt HD  Monitor HH needs vs rehab for deconditioned status      Jenna M Oppenheimer, PAShharzadC  Liver Transplant  [facility]

## 2018-01-31 NOTE — PLAN OF CARE
AAOx4, VSS, tmax- 102.5- blood cultures returned NGTD, SpO- 93-96% on RA  - tube feedings @ goal rate 45cc/hr, x-ray verified tube placement in R Nare, nausea and vomiting overnight PRN zofran + compazine given x2, 1 BM overnight  - Chevron incision changed per wound care Tuesday + Friday, pain controlled q 4 h.   - Plan for HD this am + thoracentesis in RLL for pleural effusion  - Bed in lowest position, non skid socks worn, call light within reach, mother to remain at bedside.

## 2018-01-31 NOTE — TELEPHONE ENCOUNTER
----- Message from Imer Badillo MD sent at 1/30/2018  2:03 PM CST -----  ERCP  with stent change - needs repeat ERCP in 6-8 weeks

## 2018-01-31 NOTE — PROGRESS NOTES
SW met with pts mom alone today for continuity of care, as patient was down in HD and then would be going to IR for a thoracentesis.   Pts mom presents alert and oriented x 4, engaging and communicative.  Mom with good questions for team on rounds today.  Mom reports they finally did receive some money from [employer], but she couldn't remember the amount.  She denies coping issues, but verbalized worry for pt since he has been unable to eat and is not very strong and was not tolerating tube feeds at times.   SW provided listening support.  Pts mom denies any psychosocial needs at this time.  SW will return tomorrow to meet with pt and assess for any coping and psychosocial issues.   SW remains available for all transplant resources, education and psychosocial support.

## 2018-01-31 NOTE — NURSING
Pt febrile @ 102.1. Tylenol administered per order. RIMA De La Garza made aware of pt's temp. Will recheck temp.

## 2018-01-31 NOTE — ASSESSMENT & PLAN NOTE
- Post op course complicated by fevers and hyperbilirubinemia.  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  - AST/ALT normal. Tbili/alkphos remain elevated.  - Liver US 1/8 showed 3.9 cm complex fluid collection anterior to right lobe and moderate nonspecific complex ascites inferior to transplant.   - IR placed drain 1/11/18, cell count negative for infection.  - PBS consulted. PBS not comfortable proceeding with EUS with liver biopsy given fever and possible infectious process going on. ERCP 1/17 with stone and sludge. Pt placed on Actigall - treated with 10 day course of abx.  ID now signed off.  - Bili remains elevated - liver u/s 1/28  - Liver biopsy 1/23 without rejection.  - consulted PBS for ERCP - Choledocholithiasis was found along with sludge, stents placed 1/30

## 2018-02-01 LAB
ALBUMIN SERPL BCP-MCNC: 2 G/DL
ALP SERPL-CCNC: 158 U/L
ALT SERPL W/O P-5'-P-CCNC: 6 U/L
ANION GAP SERPL CALC-SCNC: 9 MMOL/L
AST SERPL-CCNC: 29 U/L
BASOPHILS # BLD AUTO: 0.01 K/UL
BASOPHILS NFR BLD: 0.3 %
BILIRUB SERPL-MCNC: 4 MG/DL
BUN SERPL-MCNC: 20 MG/DL
CALCIUM SERPL-MCNC: 9.1 MG/DL
CHLORIDE SERPL-SCNC: 101 MMOL/L
CO2 SERPL-SCNC: 28 MMOL/L
CREAT SERPL-MCNC: 3.4 MG/DL
CYCLOSPORINE BLD LC/MS/MS-MCNC: 188 NG/ML
DIFFERENTIAL METHOD: ABNORMAL
EOSINOPHIL # BLD AUTO: 0.2 K/UL
EOSINOPHIL NFR BLD: 4.3 %
ERYTHROCYTE [DISTWIDTH] IN BLOOD BY AUTOMATED COUNT: 15.8 %
EST. GFR  (AFRICAN AMERICAN): 26.8 ML/MIN/1.73 M^2
EST. GFR  (NON AFRICAN AMERICAN): 23.2 ML/MIN/1.73 M^2
GLUCOSE SERPL-MCNC: 100 MG/DL
HCT VFR BLD AUTO: 21.4 %
HGB BLD-MCNC: 7.2 G/DL
IMM GRANULOCYTES # BLD AUTO: 0.03 K/UL
IMM GRANULOCYTES NFR BLD AUTO: 0.9 %
LYMPHOCYTES # BLD AUTO: 0.8 K/UL
LYMPHOCYTES NFR BLD: 22.8 %
MAGNESIUM SERPL-MCNC: 2 MG/DL
MCH RBC QN AUTO: 32 PG
MCHC RBC AUTO-ENTMCNC: 33.6 G/DL
MCV RBC AUTO: 95 FL
MONOCYTES # BLD AUTO: 0.6 K/UL
MONOCYTES NFR BLD: 15.9 %
NEUTROPHILS # BLD AUTO: 1.9 K/UL
NEUTROPHILS NFR BLD: 55.8 %
NRBC BLD-RTO: 0 /100 WBC
PHOSPHATE SERPL-MCNC: 1.4 MG/DL
PLATELET # BLD AUTO: 130 K/UL
PMV BLD AUTO: 10 FL
POTASSIUM SERPL-SCNC: 3.4 MMOL/L
PROT SERPL-MCNC: 5.7 G/DL
RBC # BLD AUTO: 2.25 M/UL
SODIUM SERPL-SCNC: 138 MMOL/L
VANCOMYCIN SERPL-MCNC: 27.5 UG/ML
WBC # BLD AUTO: 3.46 K/UL

## 2018-02-01 PROCEDURE — 25000003 PHARM REV CODE 250: Performed by: NURSE PRACTITIONER

## 2018-02-01 PROCEDURE — 87305 ASPERGILLUS AG IA: CPT

## 2018-02-01 PROCEDURE — 87632 RESP VIRUS 6-11 TARGETS: CPT

## 2018-02-01 PROCEDURE — 80053 COMPREHEN METABOLIC PANEL: CPT

## 2018-02-01 PROCEDURE — 63600175 PHARM REV CODE 636 W HCPCS: Performed by: PHYSICIAN ASSISTANT

## 2018-02-01 PROCEDURE — 25000003 PHARM REV CODE 250: Performed by: PHYSICIAN ASSISTANT

## 2018-02-01 PROCEDURE — 36415 COLL VENOUS BLD VENIPUNCTURE: CPT

## 2018-02-01 PROCEDURE — 87449 NOS EACH ORGANISM AG IA: CPT

## 2018-02-01 PROCEDURE — 97803 MED NUTRITION INDIV SUBSEQ: CPT | Performed by: DIETITIAN, REGISTERED

## 2018-02-01 PROCEDURE — 80202 ASSAY OF VANCOMYCIN: CPT

## 2018-02-01 PROCEDURE — 80158 DRUG ASSAY CYCLOSPORINE: CPT

## 2018-02-01 PROCEDURE — 25500020 PHARM REV CODE 255: Performed by: SURGERY

## 2018-02-01 PROCEDURE — 83735 ASSAY OF MAGNESIUM: CPT

## 2018-02-01 PROCEDURE — 85025 COMPLETE CBC W/AUTO DIFF WBC: CPT

## 2018-02-01 PROCEDURE — 25000003 PHARM REV CODE 250: Performed by: STUDENT IN AN ORGANIZED HEALTH CARE EDUCATION/TRAINING PROGRAM

## 2018-02-01 PROCEDURE — 99233 SBSQ HOSP IP/OBS HIGH 50: CPT | Mod: ,,, | Performed by: INTERNAL MEDICINE

## 2018-02-01 PROCEDURE — 63600175 PHARM REV CODE 636 W HCPCS: Performed by: NURSE PRACTITIONER

## 2018-02-01 PROCEDURE — 20600001 HC STEP DOWN PRIVATE ROOM

## 2018-02-01 PROCEDURE — 63600175 PHARM REV CODE 636 W HCPCS: Performed by: SURGERY

## 2018-02-01 PROCEDURE — 84100 ASSAY OF PHOSPHORUS: CPT

## 2018-02-01 PROCEDURE — 99233 SBSQ HOSP IP/OBS HIGH 50: CPT | Mod: ,,, | Performed by: NURSE PRACTITIONER

## 2018-02-01 RX ORDER — OXYCODONE HYDROCHLORIDE 5 MG/1
5 TABLET ORAL ONCE
Status: COMPLETED | OUTPATIENT
Start: 2018-02-01 | End: 2018-02-01

## 2018-02-01 RX ORDER — FLUCONAZOLE 200 MG/1
200 TABLET ORAL DAILY
Status: DISCONTINUED | OUTPATIENT
Start: 2018-02-01 | End: 2018-02-01

## 2018-02-01 RX ORDER — METOCLOPRAMIDE 10 MG/1
10 TABLET ORAL
Status: DISCONTINUED | OUTPATIENT
Start: 2018-02-01 | End: 2018-02-02

## 2018-02-01 RX ADMIN — ATOVAQUONE 1500 MG: 750 SUSPENSION ORAL at 08:02

## 2018-02-01 RX ADMIN — POLYETHYLENE GLYCOL 3350 17 G: 17 POWDER, FOR SOLUTION ORAL at 08:02

## 2018-02-01 RX ADMIN — PIPERACILLIN AND TAZOBACTAM 2.25 G: 4; .5 INJECTION, POWDER, LYOPHILIZED, FOR SOLUTION INTRAVENOUS; PARENTERAL at 10:02

## 2018-02-01 RX ADMIN — PROCHLORPERAZINE EDISYLATE 10 MG: 5 INJECTION INTRAMUSCULAR; INTRAVENOUS at 06:02

## 2018-02-01 RX ADMIN — ERYTHROPOIETIN 10000 UNITS: 10000 INJECTION, SOLUTION INTRAVENOUS; SUBCUTANEOUS at 09:02

## 2018-02-01 RX ADMIN — CYCLOSPORINE 125 MG: 100 CAPSULE, LIQUID FILLED ORAL at 08:02

## 2018-02-01 RX ADMIN — METOCLOPRAMIDE HYDROCHLORIDE 5 MG: 5 TABLET ORAL at 05:02

## 2018-02-01 RX ADMIN — PIPERACILLIN AND TAZOBACTAM 2.25 G: 4; .5 INJECTION, POWDER, LYOPHILIZED, FOR SOLUTION INTRAVENOUS; PARENTERAL at 09:02

## 2018-02-01 RX ADMIN — DOCUSATE SODIUM 100 MG: 100 CAPSULE, LIQUID FILLED ORAL at 08:02

## 2018-02-01 RX ADMIN — URSODIOL 300 MG: 300 CAPSULE ORAL at 10:02

## 2018-02-01 RX ADMIN — IOHEXOL 75 ML: 350 INJECTION, SOLUTION INTRAVENOUS at 09:02

## 2018-02-01 RX ADMIN — ACETAMINOPHEN 650 MG: 325 TABLET, FILM COATED ORAL at 08:02

## 2018-02-01 RX ADMIN — ACETAMINOPHEN 650 MG: 325 TABLET, FILM COATED ORAL at 01:02

## 2018-02-01 RX ADMIN — METOCLOPRAMIDE 10 MG: 10 TABLET ORAL at 10:02

## 2018-02-01 RX ADMIN — OXYCODONE HYDROCHLORIDE 5 MG: 5 TABLET ORAL at 08:02

## 2018-02-01 RX ADMIN — HEPARIN SODIUM 5000 UNITS: 5000 INJECTION, SOLUTION INTRAVENOUS; SUBCUTANEOUS at 10:02

## 2018-02-01 RX ADMIN — ONDANSETRON HYDROCHLORIDE 4 MG: 2 INJECTION, SOLUTION INTRAMUSCULAR; INTRAVENOUS at 01:02

## 2018-02-01 RX ADMIN — FLUCONAZOLE 200 MG: 200 TABLET ORAL at 11:02

## 2018-02-01 RX ADMIN — CYCLOSPORINE 125 MG: 100 CAPSULE, LIQUID FILLED ORAL at 05:02

## 2018-02-01 RX ADMIN — OXYCODONE HYDROCHLORIDE 5 MG: 5 TABLET ORAL at 12:02

## 2018-02-01 RX ADMIN — OMEPRAZOLE 40 MG: 40 CAPSULE, DELAYED RELEASE ORAL at 05:02

## 2018-02-01 RX ADMIN — LEVETIRACETAM 500 MG: 500 TABLET ORAL at 08:02

## 2018-02-01 RX ADMIN — LEVETIRACETAM 500 MG: 500 TABLET ORAL at 10:02

## 2018-02-01 RX ADMIN — DOCUSATE SODIUM 100 MG: 100 CAPSULE, LIQUID FILLED ORAL at 10:02

## 2018-02-01 RX ADMIN — URSODIOL 300 MG: 300 CAPSULE ORAL at 08:02

## 2018-02-01 RX ADMIN — OXYCODONE HYDROCHLORIDE 5 MG: 5 TABLET ORAL at 11:02

## 2018-02-01 RX ADMIN — METOCLOPRAMIDE HYDROCHLORIDE 5 MG: 5 TABLET ORAL at 11:02

## 2018-02-01 RX ADMIN — OXYCODONE HYDROCHLORIDE 5 MG: 5 TABLET ORAL at 05:02

## 2018-02-01 RX ADMIN — OXYCODONE HYDROCHLORIDE 5 MG: 5 TABLET ORAL at 01:02

## 2018-02-01 RX ADMIN — METOCLOPRAMIDE 10 MG: 10 TABLET ORAL at 04:02

## 2018-02-01 RX ADMIN — POTASSIUM PHOSPHATE, MONOBASIC AND POTASSIUM PHOSPHATE, DIBASIC 30 MMOL: 224; 236 INJECTION, SOLUTION INTRAVENOUS at 11:02

## 2018-02-01 RX ADMIN — LEVOTHYROXINE SODIUM 75 MCG: 75 TABLET ORAL at 05:02

## 2018-02-01 RX ADMIN — ISAVUCONAZONIUM SULFATE 372 MG: 186 CAPSULE ORAL at 10:02

## 2018-02-01 RX ADMIN — OMEPRAZOLE 40 MG: 40 CAPSULE, DELAYED RELEASE ORAL at 04:02

## 2018-02-01 RX ADMIN — ONDANSETRON HYDROCHLORIDE 4 MG: 2 INJECTION, SOLUTION INTRAMUSCULAR; INTRAVENOUS at 05:02

## 2018-02-01 NOTE — PROGRESS NOTES
[facility]  Adult Nutrition  Progress Note    SUMMARY     Recommendations    Recommendation/Intervention: Continue current TF regimen of Novasource Renal at 45mL/hr. Flushes per MD. Hold for residuals > 500mL.     Continue regular diet. Will continue to encourage patient to increase oral intake.     RD continues to endorse placement of PEJ tube for nocturnal feedings once medical condition improves. RD following.     Goals: Consume/tolerate > 85% EEN and EPN  Nutrition Goal Status: progressing towards goal  Communication of RD Recs: reviewed with physician    Reason for Assessment    Reason for Assessment: RD follow-up  Diagnosis: transplant/postoperative complications  Relevent Medical History: OLTx 10/19/2017, complicate post-op w/ LAURA ongoing, chronic malnutrition, cirrhosis 2' EtOH   Interdisciplinary Rounds: attended     General Information Comments: TF running at goal rate and tiffani'ing, pt with fever now and nausea; end of NGT into transverse duodenum, now on kidney tx list, constipated    Nutrition Discharge Planning: Adequate nutrition PO vs EN    Nutrition Prescription Ordered    Current Diet Order: Regular     Current Nutrition Support Formula Ordered: Novasource Renal  Current Nutrition Support Rate Ordered: 45 (ml)  Current Nutrition Support Frequency Ordered: mL/hr      Evaluation of Received Nutrients/Fluid Intake    Enteral Calories (kcal): 2160  Enteral Protein (gm): 98  Enteral (Free Water) Fluid (mL): 774     Parenteral Calories (kcal): 0  Parenteral Protein (gm): 0  Parenteral Fluid (mL): 0      Total Calories (kcal): 0     Energy Calories Required: meeting needs  % Kcal Needs: 100      Protein Required: meeting needs  % Protein Needs: 100      GIR (Glucose Infusion Rate) (mg/kg/min): 0 mg/kg/min  Lipid Calories (kcals): 0 kcals  I/O: -1.8L since admit, no uop       Fluid Required:  (per MD)  Comments: tolerating tube feeds at goal rate  Tolerance: tolerating  % Intake of  "Estimated Energy Needs: 75 - 100 %  % Meal Intake: 0%     Nutrition Risk Screen     Nutrition Risk Screen: large or nonhealing wound, burn or pressure ulcer    Nutrition/Diet History    Patient Reported Diet/Restrictions/Preferences: low salt  Typical Food/Fluid Intake: eats very little at this time  Food Preferences: No cultural or Zoroastrianism food preferences noted  Meal/Snack Patterns: pt likes to cook, when feeling well will cook and then only eat bites  Supplemental Drinks or Food Habits:  (protein powders sometimes)  Factors Affecting Nutritional Intake: altered gastrointestinal function, abdominal distention, decreased appetite, nausea/vomiting      Labs/Tests/Procedures/Meds     Pertinent Labs Reviewed: reviewed, pertinent  Pertinent Labs Comments: K 3.4, Cr 3.4, gfr 23.2, P 1.4, PAB 14, TBili 4.0  Pertinent Medications Reviewed: reviewed, pertinent  Pertinent Medications Comments: omeprazole, psyllium, docusate, heparin, metoclopramide    Physical Findings    Overall Physical Appearance: generalized wasting, loss of muscle mass, loss of subcutaneous fat, weak  Tubes: nasoduoduenal tube  Oral/Mouth Cavity: WDL  Skin: intact    Anthropometrics    Temp: 100.2 °F (37.9 °C)     Height: 5' 6" (167.6 cm)  Weight Method: Bed Scale  Weight: 67 kg (147 lb 11.3 oz)  Ideal Body Weight (IBW), Male: 142 lb     % Ideal Body Weight, Male (lb): 112.68 lb     BMI (Calculated): 25.9  BMI Grade: 25 - 29.9 - overweight  Weight Loss: unintentional  Usual Body Weight (UBW), k kg (2017 )     % Usual Body Weight: 86.71  % Weight Change From Usual Weight: -13.47 %     Estimated/Assessed Needs    Weight Used For Calorie Calculations: 62.3 kg (137 lb 5.6 oz)   Height (cm): 167.6 cm  Energy Calorie Requirements (kcal): 2690-9768 (30-35 kcal/kg)  Energy Need Method: Kcal/kg      RMR (Duplin-St. Jeor Equation): 1535.75      Weight Used For Protein Calculations: 62.3 kg (137 lb 5.6 oz)  Protein Requirements: 81-94g (1.3-1.5 " g/kg)    Fluid Requirements (mL): 1mL/kcal  Fluid Need Method: RDA Method      RDA Method (mL): 1869            Assessment and Plan    Severe protein-calorie malnutrition    Nutrition Diagnosis  Inadequate oral intake    Related to (etiology):   Decreased appetite, abdominal distention    Signs and Symptoms (as evidenced by):   Pt eating 0% of meals and relying on enteral nutrition for 100% of EPN, EEN     Interventions/Recommendations (treatment strategy):  See recs    Nutrition Diagnosis Status:   New              Monitor and Evaluation    Food and Nutrient Intake: energy intake, food and beverage intake  Food and Nutrient Adminstration: diet order  Knowledge/Beliefs/Attitudes: food and nutrition knowledge/skill  Physical Activity and Function: nutrition-related ADLs and IADLs  Anthropometric Measurements: weight, weight change, body mass index  Biochemical Data, Medical Tests and Procedures: electrolyte and renal panel, lipid profile, gastrointestinal profile, glucose/endocrine profile, inflammatory profile  Nutrition-Focused Physical Findings: overall appearance    Nutrition Risk    Level of Risk:  (2x/week)    Nutrition Follow-Up    RD Follow-up?: Yes

## 2018-02-01 NOTE — ASSESSMENT & PLAN NOTE
- Enema ordered 1/20 and 1/21 with BM  - Encourage ambulation.  - decrease narcotics  - d/c metamucil and miralax bid as makes patient nauseated  - increase reglan qid

## 2018-02-01 NOTE — NURSING
RN called to bedside by patient needing pain medication. Upon RN entering to administer pain medication pt stating he feels like he is going to throw up and like he cannot breathe. Emesis bag given to patient and patient immediately vomited bilious emesis. Tube feeds paused while patient vomiting. Pt coughing and O2 sats at 89% on room air. Oxygen applied and O2 immediately recovered to 97-99%. Vital signs stable with exception of temp 101.1 and . Tylenol administered per PRN order. Pt breath sounds coarse upon ascultation. Seda, NP notified. Chest x-ray ordered.

## 2018-02-01 NOTE — NURSING
RN called to bedside to recheck pt temp after tylenol given at 1330. Pt temp increased to 102.5. SAMREEN Stack notified. Ice packs placed under pt arms and in groin to assist in lowering pt temp.     New orders for RSV by PCR, aspergillus and fungitell collected by RN and sent to lab.

## 2018-02-01 NOTE — PROGRESS NOTES
[facility]  Infectious Disease  Progress Note    Patient Name: Jhonny Diana  MRN: [id number]  Admission Date: 1/11/2018  Length of Stay: 21 days  Attending Physician: Nathanael Medina MD  Primary Care Provider: Primary Doctor No    Isolation Status: No active isolations  Assessment/Plan:      * Fever-resolved as of 1/26/2018    26 y/o male with a history of alcoholic hepatitis  - s/p liver transplant  10/19/17  - CMV D+/R+  - induction: steroids  - maintenance: cyclosporine  - OI prophylaxis: isavuconazole  - complications: seizures, LAURA, superificial wound infection, several admissions with culture negative peritonitis due to suspected indolent biliary leakage   - D called back today due to fever  - ERCP 1/30/18: one stent removed from biliary tree, Two plastic stents were placed into the common bile duct  - Chest/A/P CT 1/28/18: Moderate sized right pleural effusion, moderate amount of partially loculated intraperitoneal fluid throughout the abdomen, largest pocket in the right lower quadrant/ pelvis  - thoracentesis 2/1/18: 130 wbc (34% N), Protein ratio 0.5, culture NGTD  - B/C 2/1/18 NGTD  - suspect cause of fever is from intraabdominal source (IA fluid collections)    Recommendations:  - continue zosyn   - follow new CT scans              Anticipated Disposition: pending    Thank you for your consult. I will follow-up with patient. Please contact us if you have any additional questions.    Rk Che MD  Infectious Disease Fellow, PGY-5  Spectra: [phone]  Pager: [phone]  [facility]    Subjective:     Principal Problem:Fever    HPI: No notes on file  Interval History: ID called back today due to fever    - ERCP 1/30/18: one stent removed from biliary tree, Two plastic stents were placed into the common bile duct    - Chest/A/P CT 1/28/18: Moderate sized right pleural effusion with near-complete collapse of the right lower lobe, slightly improved from the 1/18/18 exam.    Moderate amount of partially loculated intraperitoneal fluid throughout the abdomen, largest pocket in the right lower quadrant/ pelvis, also overall slightly improved.    - thoracentesis 2/1/18: 130 wbc (34% N), Protein ratio 0.5    Review of Systems   Constitutional: Positive for chills, fatigue and fever.   HENT: Negative for sore throat.    Respiratory: Negative for cough, chest tightness and shortness of breath.    Cardiovascular: Negative for chest pain, palpitations and leg swelling.   Gastrointestinal: Positive for abdominal distention and abdominal pain. Negative for diarrhea, nausea and vomiting.   Genitourinary: Negative for dysuria, flank pain and urgency.   Skin: Negative for rash.   Neurological: Negative for dizziness, light-headedness and numbness.   Psychiatric/Behavioral: Negative for confusion.     Objective:     Vital Signs (Most Recent):  Temp: (!) 101.9 °F (38.8 °C) (02/01/18 1639)  Pulse: 106 (02/01/18 1529)  Resp: 16 (02/01/18 1529)  BP: (!) 148/94 (02/01/18 1529)  SpO2: (!) 92 % (02/01/18 1529) Vital Signs (24h Range):  Temp:  [98.7 °F (37.1 °C)-102.9 °F (39.4 °C)] 101.9 °F (38.8 °C)  Pulse:  [103-113] 106  Resp:  [16-20] 16  SpO2:  [89 %-97 %] 92 %  BP: (125-148)/(72-94) 148/94     Weight: 67 kg (147 lb 11.3 oz)  Body mass index is 23.84 kg/m².    Estimated Creatinine Clearance: 29.2 mL/min (based on SCr of 3.4 mg/dL (H)).    Physical Exam   Constitutional: He is oriented to person, place, and time. He appears ill.   HENT:   Mouth/Throat: No oropharyngeal exudate.   Eyes: No scleral icterus.   Cardiovascular: Regular rhythm.  Tachycardia present.    Pulmonary/Chest: Effort normal and breath sounds normal. No respiratory distress. He has no wheezes. He has no rales.   Abdominal: Soft. There is no tenderness. There is no rebound.   Surgical incision wrapped, but pictures reviewed and it is healing well   Musculoskeletal: He exhibits edema.   Lymphadenopathy:     He has no cervical  adenopathy.   Neurological: He is alert and oriented to person, place, and time. No cranial nerve deficit.   Skin: He is not diaphoretic.       Significant Labs:   Bilirubin:   Recent Labs  Lab 01/05/18  0805  01/12/18  0627  01/28/18  0430 01/29/18  0430 01/30/18  0448 01/31/18  0423 02/01/18  0445   BILIDIR 2.2*  --  2.0*  --   --   --   --   --   --    BILITOT 3.2*  < > 2.7*  < > 3.2* 4.4* 4.2* 5.1* 4.0*   < > = values in this interval not displayed.  Blood Culture:     Recent Labs  Lab 01/17/18  1651 01/18/18  1922 01/18/18  1927 01/28/18  1122 01/30/18  1916   LABBLOO No growth after 5 days. No growth after 5 days. No growth after 5 days. No Growth to date  No Growth to date  No Growth to date  No Growth to date  No Growth to date No Growth to date  No Growth to date  No Growth to date  No Growth to date     BMP:     Recent Labs  Lab 02/01/18 0445         K 3.4*      CO2 28   BUN 20   CREATININE 3.4*   CALCIUM 9.1   MG 2.0     CBC:     Recent Labs  Lab 01/31/18  0423 02/01/18  0445   WBC 5.39 3.46*   HGB 7.5* 7.2*   HCT 22.7* 21.4*    130*     CMP:     Recent Labs  Lab 01/31/18 0423 02/01/18  0445    138   K 4.0 3.4*    101   CO2 20* 28   GLU 87 100   BUN 20 20   CREATININE 4.1* 3.4*   CALCIUM 10.4 9.1   PROT 5.7* 5.7*   ALBUMIN 2.0* 2.0*   BILITOT 5.1* 4.0*   ALKPHOS 165* 158*   AST 28 29   ALT 7* 6*   ANIONGAP 13 9   EGFRNONAA 18.5* 23.2*     Microbiology Results (last 7 days)     Procedure Component Value Units Date/Time    Respiratory Viral Panel by PCR Haimner; Nasal Swab [[id number]] Collected:  02/01/18 1552    Order Status:  Sent Specimen:  Respiratory Updated:  02/01/18 1553    Culture, Anaerobic [[id number]] Collected:  01/31/18 1344    Order Status:  Completed Specimen:  Body Fluid from Pleural Fluid Updated:  02/01/18 1436     Anaerobic Culture Culture in progress    Blood culture [[id number]] Collected:  01/28/18 1122    Order Status:  Completed Specimen:   Blood Updated:  02/01/18 1412     Blood Culture, Routine No Growth to date     Blood Culture, Routine No Growth to date     Blood Culture, Routine No Growth to date     Blood Culture, Routine No Growth to date     Blood Culture, Routine No Growth to date    Blood culture [[id number]] Collected:  01/30/18 1916    Order Status:  Completed Specimen:  Blood Updated:  01/31/18 2212     Blood Culture, Routine No Growth to date     Blood Culture, Routine No Growth to date    Blood culture [[id number]] Collected:  01/30/18 1916    Order Status:  Completed Specimen:  Blood Updated:  01/31/18 2212     Blood Culture, Routine No Growth to date     Blood Culture, Routine No Growth to date    Culture, Body Fluid (Aerobic) w/ GS [[id number]] Collected:  01/31/18 1344    Order Status:  Completed Specimen:  Body Fluid from Pleural Fluid Updated:  01/31/18 2156     Gram Stain Result Rare WBC's      No organisms seen    Fungus culture [[id number]] Collected:  01/31/18 1344    Order Status:  Sent Specimen:  Body Fluid from Pleural Fluid Updated:  01/31/18 1540    Blood culture [[id number]]     Order Status:  Canceled Specimen:  Blood     Blood culture [[id number]]     Order Status:  Canceled Specimen:  Blood     Blood culture [[id number]]     Order Status:  Canceled Specimen:  Blood     Urine culture [[id number]]     Order Status:  Canceled Specimen:  Urine     Culture, Anaerobic [[id number]] Collected:  01/19/18 1140    Order Status:  Completed Specimen:  Pleural Fluid from Pleural Fluid Updated:  01/26/18 0747     Anaerobic Culture No anaerobes isolated          Significant Imaging: I have reviewed all pertinent imaging results/findings within the past 24 hours.

## 2018-02-01 NOTE — ASSESSMENT & PLAN NOTE
26 y/o male with a history of alcoholic hepatitis  - s/p liver transplant  10/19/17  - CMV D+/R+  - induction: steroids  - maintenance: cyclosporine  - OI prophylaxis: isavuconazole  - complications: seizures, LAURA, superificial wound infection, several admissions with culture negative peritonitis due to suspected indolent biliary leakage   - D called back today due to fever  - ERCP 1/30/18: one stent removed from biliary tree, Two plastic stents were placed into the common bile duct  - Chest/A/P CT 1/28/18: Moderate sized right pleural effusion, moderate amount of partially loculated intraperitoneal fluid throughout the abdomen, largest pocket in the right lower quadrant/ pelvis  - thoracentesis 2/1/18: 130 wbc (34% N), Protein ratio 0.5, culture NGTD  - B/C 2/1/18 NGTD  - suspect cause of fever is from intraabdominal source (IA fluid collections)    Recommendations:  - continue zosyn   - follow new CT scans

## 2018-02-01 NOTE — ASSESSMENT & PLAN NOTE
Given his ESRD, patient meets the criteria for kidney transplant, however Patient's hospitalization course has been complicated by various medical issues. I will discuss the case at selection meeting on Friday.

## 2018-02-01 NOTE — SUBJECTIVE & OBJECTIVE
Scheduled Meds:   atovaquone  1,500 mg Oral Daily    cycloSPORINE modified  125 mg Oral BID    docusate sodium  100 mg Oral BID    epoetin maurisio (PROCRIT) injection  10,000 Units Intravenous Every Tues, Thurs, Sat    fluconazole  200 mg Oral Daily    heparin (porcine)  5,000 Units Subcutaneous Q8H    levETIRAcetam  500 mg Oral BID    levothyroxine  75 mcg Oral Before breakfast    metoclopramide HCl  5 mg Oral QID (AC & HS)    omeprazole  40 mg Oral BID AC    custom IVPB builder  2.25 g Intravenous Q12H    polyethylene glycol  17 g Oral BID    potassium phosphate IVPB  30 mmol Intravenous Once    psyllium husk (aspartame)  3.4 g Oral BID    ursodiol  300 mg Oral BID     Continuous Infusions:  PRN Meds:sodium chloride, acetaminophen, bisacodyl, heparin (porcine), omnipaque, ondansetron, ondansetron, oxyCODONE, prochlorperazine, simethicone, sodium chloride 0.9%    Review of Systems   Constitutional: Positive for activity change, appetite change, chills, fatigue and fever.   HENT: Negative.  Negative for congestion and facial swelling.    Eyes: Negative for pain, discharge and visual disturbance.   Respiratory: Negative for cough, shortness of breath and wheezing.    Cardiovascular: Negative for chest pain and leg swelling.   Gastrointestinal: Positive for abdominal distention, abdominal pain and constipation. Negative for nausea and vomiting.   Genitourinary: Positive for decreased urine volume. Negative for difficulty urinating.        Anuric   Musculoskeletal: Negative for arthralgias, back pain and myalgias.   Skin: Positive for color change and wound. Negative for rash.   Allergic/Immunologic: Positive for immunocompromised state.   Neurological: Negative for dizziness, seizures, weakness and headaches.   Psychiatric/Behavioral: Positive for decreased concentration and dysphoric mood. Negative for confusion. The patient is not nervous/anxious.    All other systems reviewed and are  negative.    Objective:     Vital Signs (Most Recent):  Temp: (!) 101.1 °F (38.4 °C) (02/01/18 1333)  Pulse: 110 (02/01/18 1333)  Resp: 20 (02/01/18 1333)  BP: 134/75 (02/01/18 1333)  SpO2: (!) 92 % (02/01/18 1336) Vital Signs (24h Range):  Temp:  [98.7 °F (37.1 °C)-102.9 °F (39.4 °C)] 101.1 °F (38.4 °C)  Pulse:  [103-116] 110  Resp:  [16-20] 20  SpO2:  [89 %-97 %] 92 %  BP: (117-144)/(67-83) 134/75     Weight: 67 kg (147 lb 11.3 oz)  Body mass index is 23.84 kg/m².    Intake/Output - Last 3 Shifts       01/30 0700 - 01/31 0659 01/31 0700 - 02/01 0659 02/01 0700 - 02/02 0659    P.O. 90 200     I.V. (mL/kg) 400 (6)      Blood 306.7      Other  600     NG/GT 30 30     IV Piggyback 250 300     Total Intake(mL/kg) 1076.7 (16.1) 1130 (16.9)     Urine (mL/kg/hr) 0 (0) 0 (0)     Emesis/NG output 0 (0) 0 (0)     Other 0 (0) 3000 (1.9)     Stool 0 (0) 0 (0)     Blood 0 (0) 0 (0)     Total Output 0 3000      Net +1076.7 -1870             Urine Occurrence 0 x 0 x     Stool Occurrence 2 x 0 x 0 x    Emesis Occurrence 0 x 0 x           Physical Exam   Constitutional: He is oriented to person, place, and time. He appears well-developed. No distress.   Temporal and distal extremity muscle wasting   HENT:   Head: Normocephalic and atraumatic.   Mouth/Throat: No oropharyngeal exudate.   dylan tube   Eyes: EOM are normal. Pupils are equal, round, and reactive to light. Scleral icterus is present.   Neck: Normal range of motion. Neck supple. No JVD present. No thyromegaly present.   Cardiovascular: Normal rate, regular rhythm, normal heart sounds and intact distal pulses.    No murmur heard.  Pulmonary/Chest: Effort normal. No respiratory distress. He has decreased breath sounds in the right middle field, the right lower field and the left lower field. He has no wheezes. He exhibits no tenderness.   Diminished to RLL   Abdominal: Soft. Bowel sounds are normal. He exhibits ascites. He exhibits no distension. There is tenderness (mild).  There is no rebound and no guarding.   Dressing to chevron.  Wd vac removed 1/12/18  Dependent edema present R flank; surrounding erythema   Musculoskeletal: Normal range of motion. He exhibits edema (2+ LE edema). He exhibits no tenderness.   Neurological: He is alert and oriented to person, place, and time. He has normal reflexes.   Skin: Skin is warm and dry. Capillary refill takes 2 to 3 seconds. He is not diaphoretic. No erythema.   jaundice   Psychiatric: He has a normal mood and affect. His behavior is normal. Judgment and thought content normal. His mood appears not anxious.   Depressed mood   Nursing note and vitals reviewed.      Laboratory:  Immunosuppressants         Stop Route Frequency     cycloSPORINE modified capsule 125 mg      -- Oral 2 times daily        CBC:     Recent Labs  Lab 02/01/18  0445   WBC 3.46*   RBC 2.25*   HGB 7.2*   HCT 21.4*   *   MCV 95   MCH 32.0*   MCHC 33.6     CMP:     Recent Labs  Lab 02/01/18  0445      CALCIUM 9.1   ALBUMIN 2.0*   PROT 5.7*      K 3.4*   CO2 28      BUN 20   CREATININE 3.4*   ALKPHOS 158*   ALT 6*   AST 29   BILITOT 4.0*     Labs within the past 24 hours have been reviewed.    Diagnostic Results:  I have personally reviewed all pertinent imaging studies.

## 2018-02-01 NOTE — NURSING
RN received call from CT. Tube feedings to be held for 4 hours prior to CT. Tube feeds turned off. Pt to be scanned after 2030 tonight. RN will need to accompany pt to CT to hook CT contrast to pt's central line.

## 2018-02-01 NOTE — PROGRESS NOTES
[facility]  Liver Transplant  Progress Note    Patient Name: Johnny Diana  MRN: [id number]  Admission Date: 2018  Hospital Length of Stay: 21 days  Code Status: Full Code  Primary Care Provider: Primary Doctor No  Post-Operative Day: 105    ORGAN:   LIVER  Disease Etiology: Acute Alcoholic Hepatitis  Donor Type:    - Brain Death  CDC High Risk:   No  Donor CMV Status:   Donor CMV Status: Positive  Donor HBcAB:   Negative  Donor HCV Status:   Negative  Whole or Partial: Whole Liver  Biliary Anastomosis: End to End  Arterial Anatomy: Standard  Subjective:     History of Present Illness:  Jhonny Diana is a 28 y/o male with past medical history of alcoholic cirrhosis.  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 Found to have peritonitis in November (WBC 5000, 75% PNM) neg for bile leak. He was treated initially with vanc/cefepime. Repeat cell counts  with some improvement (WBC 1400, 45% PNM). He has undergone multiple paracenteses as well as abscess drainage of perihepatic fluid collections and treated with antimicrobial therapy but no positive cultures. Of note, biliary stricture also identified and ERCP performed on 2017 with sphincterotomy and biliary stent placed. Liver tests still have not normalized despite intervention, bilirubin and AP remain elevated. Other pertinent PMH current wound vac in place 2/2 wound infection, malnutrition requiring TPN for short course and ongoing hypoalbuminemia, and seizure activity while on prograf and has since been switched to cyclosporine without reoccurrence.  He presented to the ER for fever, abdominal pain, and N/V. He reports fever (103) for 1  day prior. Overnight, he developed N/V, reports small amount of green emesis with new left sided pain. He also endorses worsening SOB with exertion. He was scheduled as an outpatient for follow up paracentesis and IR drainage of fluid collection. CXR in ER shows large pleural effusion with subsegmental atelectasis. Infectious work up initiated in ER. His ANC is 900. Broad spectrum antibiotics initiated in ED. He denies chest pain, palpitations, diarrhea, constipation, or back pain. Denies any sick contacts.    Hospital Course:  Thoracentesis (1.2L off), Paracentesis (1.6L off), and IR drainage of fluid collection 1/11, all fluids negative for infection. ID consulted. Broad spectrum antibiotics d/c'd 1/15. Chronically malnourished with poor PO intake, prealbumin 7. Shane tube placed 1/15 for tube feedings.     ERCP 1/17 with sludge and a biliary stone which was removed and stent exchanged.  Remained with n/v; therefore, TF remained on hold and TPN continued.   Pt with fever s/p ERCP on 1/17 which continued until 1/19.  Vanc/cefepime restarted.  Blood cx 1/17 and 1/18 NGTD.  ID reconsulted.  Fungal markers sent.  CT C/A/P obtained.  With large R pleural effusion and ascites- both drained 1/19 and negative for infx per cell count. Pt afebrile 1/20. Liver biopsy 1/23 - without rejection.  EGD 1/25 - unremarkable for source of GI symptoms.  Resume diet along with other GI recommendations. SHANE tube placed for tube feeds.      Interval History:  With persistent fevers over night. ID consulted - appreciate recs.  Antibiotic coverage broaden with both vancomycin and zosyn.  ERCP 1/30 revealed stones and sludge. Stents placed. Tbili slowly improving, will watch trend. Amylase/lipase checked overnight, neg for post ERCP pancreatitis. Pt still with generalized abdominal pain although not worsening. Thoracentesis 1/31 with 1.4L removed - fluid negative for infection.  If fever persists after 24 hour of current abx coverage - will  need a scan.  Send aspergillus, resp viral panel, and fungitel.     Scheduled Meds:   atovaquone  1,500 mg Oral Daily    cycloSPORINE modified  125 mg Oral BID    docusate sodium  100 mg Oral BID    epoetin maurisio (PROCRIT) injection  10,000 Units Intravenous Every Tues, Thurs, Sat    fluconazole  200 mg Oral Daily    heparin (porcine)  5,000 Units Subcutaneous Q8H    levETIRAcetam  500 mg Oral BID    levothyroxine  75 mcg Oral Before breakfast    metoclopramide HCl  5 mg Oral QID (AC & HS)    omeprazole  40 mg Oral BID AC    custom IVPB builder  2.25 g Intravenous Q12H    polyethylene glycol  17 g Oral BID    potassium phosphate IVPB  30 mmol Intravenous Once    psyllium husk (aspartame)  3.4 g Oral BID    ursodiol  300 mg Oral BID     Continuous Infusions:  PRN Meds:sodium chloride, acetaminophen, bisacodyl, heparin (porcine), omnipaque, ondansetron, ondansetron, oxyCODONE, prochlorperazine, simethicone, sodium chloride 0.9%    Review of Systems   Constitutional: Positive for activity change, appetite change, chills, fatigue and fever.   HENT: Negative.  Negative for congestion and facial swelling.    Eyes: Negative for pain, discharge and visual disturbance.   Respiratory: Negative for cough, shortness of breath and wheezing.    Cardiovascular: Negative for chest pain and leg swelling.   Gastrointestinal: Positive for abdominal distention, abdominal pain and constipation. Negative for nausea and vomiting.   Genitourinary: Positive for decreased urine volume. Negative for difficulty urinating.        Anuric   Musculoskeletal: Negative for arthralgias, back pain and myalgias.   Skin: Positive for color change and wound. Negative for rash.   Allergic/Immunologic: Positive for immunocompromised state.   Neurological: Negative for dizziness, seizures, weakness and headaches.   Psychiatric/Behavioral: Positive for decreased concentration and dysphoric mood. Negative for confusion. The patient is not  nervous/anxious.    All other systems reviewed and are negative.    Objective:     Vital Signs (Most Recent):  Temp: (!) 101.1 °F (38.4 °C) (02/01/18 1333)  Pulse: 110 (02/01/18 1333)  Resp: 20 (02/01/18 1333)  BP: 134/75 (02/01/18 1333)  SpO2: (!) 92 % (02/01/18 1336) Vital Signs (24h Range):  Temp:  [98.7 °F (37.1 °C)-102.9 °F (39.4 °C)] 101.1 °F (38.4 °C)  Pulse:  [103-116] 110  Resp:  [16-20] 20  SpO2:  [89 %-97 %] 92 %  BP: (117-144)/(67-83) 134/75     Weight: 67 kg (147 lb 11.3 oz)  Body mass index is 23.84 kg/m².    Intake/Output - Last 3 Shifts       01/30 0700 - 01/31 0659 01/31 0700 - 02/01 0659 02/01 0700 - 02/02 0659    P.O. 90 200     I.V. (mL/kg) 400 (6)      Blood 306.7      Other  600     NG/GT 30 30     IV Piggyback 250 300     Total Intake(mL/kg) 1076.7 (16.1) 1130 (16.9)     Urine (mL/kg/hr) 0 (0) 0 (0)     Emesis/NG output 0 (0) 0 (0)     Other 0 (0) 3000 (1.9)     Stool 0 (0) 0 (0)     Blood 0 (0) 0 (0)     Total Output 0 3000      Net +1076.7 -1870             Urine Occurrence 0 x 0 x     Stool Occurrence 2 x 0 x 0 x    Emesis Occurrence 0 x 0 x           Physical Exam   Constitutional: He is oriented to person, place, and time. He appears well-developed. No distress.   Temporal and distal extremity muscle wasting   HENT:   Head: Normocephalic and atraumatic.   Mouth/Throat: No oropharyngeal exudate.   dylan tube   Eyes: EOM are normal. Pupils are equal, round, and reactive to light. Scleral icterus is present.   Neck: Normal range of motion. Neck supple. No JVD present. No thyromegaly present.   Cardiovascular: Normal rate, regular rhythm, normal heart sounds and intact distal pulses.    No murmur heard.  Pulmonary/Chest: Effort normal. No respiratory distress. He has decreased breath sounds in the right middle field, the right lower field and the left lower field. He has no wheezes. He exhibits no tenderness.   Diminished to RLL   Abdominal: Soft. Bowel sounds are normal. He exhibits ascites.  He exhibits no distension. There is tenderness (mild). There is no rebound and no guarding.   Dressing to chevron.  Wd vac removed 1/12/18  Dependent edema present R flank; surrounding erythema   Musculoskeletal: Normal range of motion. He exhibits edema (2+ LE edema). He exhibits no tenderness.   Neurological: He is alert and oriented to person, place, and time. He has normal reflexes.   Skin: Skin is warm and dry. Capillary refill takes 2 to 3 seconds. He is not diaphoretic. No erythema.   jaundice   Psychiatric: He has a normal mood and affect. His behavior is normal. Judgment and thought content normal. His mood appears not anxious.   Depressed mood   Nursing note and vitals reviewed.      Laboratory:  Immunosuppressants         Stop Route Frequency     cycloSPORINE modified capsule 125 mg      -- Oral 2 times daily        CBC:     Recent Labs  Lab 02/01/18  0445   WBC 3.46*   RBC 2.25*   HGB 7.2*   HCT 21.4*   *   MCV 95   MCH 32.0*   MCHC 33.6     CMP:     Recent Labs  Lab 02/01/18  0445      CALCIUM 9.1   ALBUMIN 2.0*   PROT 5.7*      K 3.4*   CO2 28      BUN 20   CREATININE 3.4*   ALKPHOS 158*   ALT 6*   AST 29   BILITOT 4.0*     Labs within the past 24 hours have been reviewed.    Diagnostic Results:  I have personally reviewed all pertinent imaging studies.    Assessment/Plan:     Fever    - Pt with tmax overnight 102.5   - Started on vanc/cefepime for ERCP yesterday. Plan to continue for now.   - Blood cultures sent, prelim NGTD.   - Pt went for thoracentesis today, cell counts pending.             Severe protein-calorie malnutrition    - Poor PO intake since transplant requiring short course of TPN during previous hospital stay.   - albumin remains decreased but appetite slowly improving per pt.   - Dietary consulted. Will need to closely monitor PO intake.   - supplements also ordered.   - SHANE with tube feeding start 1/16. Stopped 1/17/18, was not tolerating. Shane removed  1/20.  - prealbumin 7 on 1/15.  - TPN started 1/16/18.   - Pt able to eat minimally 1/21 and 1/22 which is an improvement.  - gi consulted - EGD reviewed, increased ppi bid, miralax bid, and metamucil bid, decreased narcotics  - continue reglan, placed SHANE tube with tube feeds          Constipation    - Enema ordered 1/20 and 1/21 with BM  - Encourage ambulation.  - decrease narcotics  - d/c metamucil and miralax bid as makes patient nauseated  - increase reglan qid            Acute renal failure with tubular necrosis    - HD resumed on previous admission. Now anuric and dialyzes M-W-F.  - Nephrology following.    - HD Sat since held Friday for thora/para.  - HD transitioned to TTS schedule.  - ktm consulted for possible kidney transplant         Delayed surgical wound healing    - wd vac removed 1/12/18.  Wound healing well. Aquacel AG and Mepilex border dressing applied via wound care recs.           Liver transplanted    - Post op course complicated by fevers and hyperbilirubinemia.  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  - AST/ALT normal. Tbili/alkphos remain elevated.  - Liver US 1/8 showed 3.9 cm complex fluid collection anterior to right lobe and moderate nonspecific complex ascites inferior to transplant.   - IR placed drain 1/11/18, cell count negative for infection.  - PBS consulted. PBS not comfortable proceeding with EUS with liver biopsy given fever and possible infectious process going on. ERCP 1/17 with stone and sludge. Pt placed on Actigall - treated with 10 day course of abx.  ID now signed off.  - Bili remains elevated - liver u/s 1/28  - Liver biopsy 1/23 without rejection.  - consulted PBS for ERCP - Choledocholithiasis was found along with sludge, stents placed 1/30          Long-term use of immunosuppressant medication    - Maintenance IS with cyclosporine. MMF on hold for infections and neutropenia. Continue to check cyclosporine level daily. Assess for toxicity  and adjust level as needed.        Prophylactic immunotherapy    - See long term use of immunosuppression.         Recurrent pleural effusion on right    - CXR in ER with large right pleural effusion with subjective c/o worsening SOB  - thoracentesis completed- 1200 ml removed.  SOB with significant improvement.   - Cell count reviewed and negative for infection.   - resp even and non labored.  O2 sat 94-98%.  - Repeat thora 1/19 negative for infection per cell count  - Reaccumulating fluid on xray 1/21.  - thoracentesis 1/31 - 1.4L removed, negative for infection        Biliary stricture of transplanted liver    - tbili rising  - Pt went for ERCP 1/30 with stones and sludge, stents placed  - Will cont to monitor.        At risk for opportunistic infections    - Hold Valcyte as WBC low.  CMV PCR 1/11, 1/18 undetected.  - bactrim held.          Anemia of chronic disease    - Continue daily CBC.  - H&H trending down   - transfused 1/30, continue procrit        Other ascites    - Paracentesis performed 1/11/18 with 1600 ml removed.    - fluid negative for infection.   - Repeat para 1/19 negative for infection per cell count  - no fluid seen for paracentesis on 1/23.        Nausea and vomiting    - h/o constipation, reports having regular BM's with bowel regimen  - vomiting episode x 1 at home. Anti-emetics ordered PRN.  - KUB repeated 1/14/18 given increased abdominal pain- mild, generalized bowel distention suggesting ileus.  Diet changed to clears as tolerated for bowel rest   - SHANE tube placed 1/16 for tube feeds. Not tolerating tube feeds, stopped 1/17/18.  - D/C shane 1/19, now in place as of 1/25.  - chronic constipation, Cont bowel regimen.   - GI consulted. See protein calorie malnutrition              VTE Risk Mitigation         Ordered     heparin (porcine) injection 5,000 Units  Every 8 hours     Route:  Subcutaneous        01/31/18 0957     heparin (porcine) injection 1,000 Units  As needed (PRN)     Route:   Intra-Catheter        01/12/18 1017     Medium Risk of VTE  Once      01/11/18 0351     Place sequential compression device  Until discontinued      01/11/18 0351          The patients clinical status was discussed at multidisplinary rounds, involving transplant surgery, transplant medicine, pharmacy, nursing, nutrition, and social work    Discharge Planning: not candidate at this time  Monitor kidney function --> may need outpt HD  Monitor HH needs vs rehab for deconditioned status      Seda Camacho, NP  Liver Transplant  [facility]

## 2018-02-01 NOTE — CONSULTS
Consult received. Full note to follow.    Please call for questions.    Thanks,  Rk Carreno, PGY-5  ID Fellow  Spectra [phone]  Pager: [phone]

## 2018-02-01 NOTE — PLAN OF CARE
-AAOx4, VSS, tmax- 102.9- ID consulted and nonpharmacologic interventions taken- refer to previous notes,SpO- 96-97% on RA- Thora yesterday 1.4 L removed, small hematoma around site. Patient removed bandage independently over night, site assessed and remains CDI and ANDRZEJ.   - tube feedings @ goal rate 45cc/hr, x-ray verified tube placement in R Nare after advancement, nausea intermittent overnight PRN zofran given before medication administration, 0 BM overnight  - Chevron incision changed per wound care Tuesday + Friday, pain controlled q 4 h x3  - Zosyn started overnight, IV cefepime discontinued, 1 time dose of vanc given yesterday- will follow up with trough levels in AM  - Bed in lowest position, non skid socks worn, call light within reach, mother to remain at bedside.

## 2018-02-01 NOTE — SUBJECTIVE & OBJECTIVE
Interval History: ID called back today due to fever    - ERCP 1/30/18: one stent removed from biliary tree, Two plastic stents were placed into the common bile duct    - Chest/A/P CT 1/28/18: Moderate sized right pleural effusion with near-complete collapse of the right lower lobe, slightly improved from the 1/18/18 exam.   Moderate amount of partially loculated intraperitoneal fluid throughout the abdomen, largest pocket in the right lower quadrant/ pelvis, also overall slightly improved.    - thoracentesis 2/1/18: 130 wbc (34% N), Protein ratio 0.5    Review of Systems   Constitutional: Positive for chills, fatigue and fever.   HENT: Negative for sore throat.    Respiratory: Negative for cough, chest tightness and shortness of breath.    Cardiovascular: Negative for chest pain, palpitations and leg swelling.   Gastrointestinal: Positive for abdominal distention and abdominal pain. Negative for diarrhea, nausea and vomiting.   Genitourinary: Negative for dysuria, flank pain and urgency.   Skin: Negative for rash.   Neurological: Negative for dizziness, light-headedness and numbness.   Psychiatric/Behavioral: Negative for confusion.     Objective:     Vital Signs (Most Recent):  Temp: (!) 101.9 °F (38.8 °C) (02/01/18 1639)  Pulse: 106 (02/01/18 1529)  Resp: 16 (02/01/18 1529)  BP: (!) 148/94 (02/01/18 1529)  SpO2: (!) 92 % (02/01/18 1529) Vital Signs (24h Range):  Temp:  [98.7 °F (37.1 °C)-102.9 °F (39.4 °C)] 101.9 °F (38.8 °C)  Pulse:  [103-113] 106  Resp:  [16-20] 16  SpO2:  [89 %-97 %] 92 %  BP: (125-148)/(72-94) 148/94     Weight: 67 kg (147 lb 11.3 oz)  Body mass index is 23.84 kg/m².    Estimated Creatinine Clearance: 29.2 mL/min (based on SCr of 3.4 mg/dL (H)).    Physical Exam   Constitutional: He is oriented to person, place, and time. He appears ill.   HENT:   Mouth/Throat: No oropharyngeal exudate.   Eyes: No scleral icterus.   Cardiovascular: Regular rhythm.  Tachycardia present.    Pulmonary/Chest:  Effort normal and breath sounds normal. No respiratory distress. He has no wheezes. He has no rales.   Abdominal: Soft. There is no tenderness. There is no rebound.   Surgical incision wrapped, but pictures reviewed and it is healing well   Musculoskeletal: He exhibits edema.   Lymphadenopathy:     He has no cervical adenopathy.   Neurological: He is alert and oriented to person, place, and time. No cranial nerve deficit.   Skin: He is not diaphoretic.       Significant Labs:   Bilirubin:   Recent Labs  Lab 01/05/18  0805  01/12/18  0627  01/28/18  0430 01/29/18  0430 01/30/18  0448 01/31/18  0423 02/01/18  0445   BILIDIR 2.2*  --  2.0*  --   --   --   --   --   --    BILITOT 3.2*  < > 2.7*  < > 3.2* 4.4* 4.2* 5.1* 4.0*   < > = values in this interval not displayed.  Blood Culture:     Recent Labs  Lab 01/17/18  1651 01/18/18  1922 01/18/18  1927 01/28/18  1122 01/30/18  1916   LABBLOO No growth after 5 days. No growth after 5 days. No growth after 5 days. No Growth to date  No Growth to date  No Growth to date  No Growth to date  No Growth to date No Growth to date  No Growth to date  No Growth to date  No Growth to date     BMP:     Recent Labs  Lab 02/01/18 0445         K 3.4*      CO2 28   BUN 20   CREATININE 3.4*   CALCIUM 9.1   MG 2.0     CBC:     Recent Labs  Lab 01/31/18 0423 02/01/18 0445   WBC 5.39 3.46*   HGB 7.5* 7.2*   HCT 22.7* 21.4*    130*     CMP:     Recent Labs  Lab 01/31/18 0423 02/01/18  0445    138   K 4.0 3.4*    101   CO2 20* 28   GLU 87 100   BUN 20 20   CREATININE 4.1* 3.4*   CALCIUM 10.4 9.1   PROT 5.7* 5.7*   ALBUMIN 2.0* 2.0*   BILITOT 5.1* 4.0*   ALKPHOS 165* 158*   AST 28 29   ALT 7* 6*   ANIONGAP 13 9   EGFRNONAA 18.5* 23.2*     Microbiology Results (last 7 days)     Procedure Component Value Units Date/Time    Respiratory Viral Panel by PCR [facility]; Nasal Swab [[id number]] Collected:  02/01/18 155    Order Status:  Sent Specimen:   Respiratory Updated:  02/01/18 1553    Culture, Anaerobic [[id number]] Collected:  01/31/18 1344    Order Status:  Completed Specimen:  Body Fluid from Pleural Fluid Updated:  02/01/18 1436     Anaerobic Culture Culture in progress    Blood culture [[id number]] Collected:  01/28/18 1122    Order Status:  Completed Specimen:  Blood Updated:  02/01/18 1412     Blood Culture, Routine No Growth to date     Blood Culture, Routine No Growth to date     Blood Culture, Routine No Growth to date     Blood Culture, Routine No Growth to date     Blood Culture, Routine No Growth to date    Blood culture [[id number]] Collected:  01/30/18 1916    Order Status:  Completed Specimen:  Blood Updated:  01/31/18 2212     Blood Culture, Routine No Growth to date     Blood Culture, Routine No Growth to date    Blood culture [[id number]] Collected:  01/30/18 1916    Order Status:  Completed Specimen:  Blood Updated:  01/31/18 2212     Blood Culture, Routine No Growth to date     Blood Culture, Routine No Growth to date    Culture, Body Fluid (Aerobic) w/ GS [[id number]] Collected:  01/31/18 1344    Order Status:  Completed Specimen:  Body Fluid from Pleural Fluid Updated:  01/31/18 2156     Gram Stain Result Rare WBC's      No organisms seen    Fungus culture [[id number]] Collected:  01/31/18 1344    Order Status:  Sent Specimen:  Body Fluid from Pleural Fluid Updated:  01/31/18 1540    Blood culture [[id number]]     Order Status:  Canceled Specimen:  Blood     Blood culture [[id number]]     Order Status:  Canceled Specimen:  Blood     Blood culture [[id number]]     Order Status:  Canceled Specimen:  Blood     Urine culture [[id number]]     Order Status:  Canceled Specimen:  Urine     Culture, Anaerobic [[id number]] Collected:  01/19/18 1140    Order Status:  Completed Specimen:  Pleural Fluid from Pleural Fluid Updated:  01/26/18 0747     Anaerobic Culture No anaerobes isolated          Significant Imaging: I have reviewed all pertinent imaging  results/findings within the past 24 hours.

## 2018-02-01 NOTE — SUBJECTIVE & OBJECTIVE
Subjective:   History of Present Illness:  Jhonny Diana is a 26 y/o male with past medical history of alcoholic cirrhosis.  S/p DDLT 10/19/2017; c/b seizures (swtiched off prograf to cyclo), ARF requiring HD (M-W-F, superficial wound infection s/p wound vac to chevron incision, and multiple admissions for fevers on 11/21, readmitted 11/24 again with fever, and 12/3. Found to have peritonitis in November (WBC 5000, 75% PNM) neg for bile leak. He was treated initially with vanc/cefepime.  He has undergone multiple paracenteses as well as abscess drainage of perihepatic fluid collections and treated with antimicrobial therapy but no positive cultures. Of note, biliary stricture also identified and ERCP performed on 12/6/2017 with sphincterotomy and biliary stent placed. Liver tests still have not normalized despite intervention, bilirubin and AP remain elevated. Other pertinent PMH current wound vac in place 2/2 wound infection, malnutrition requiring TPN.  In this admission, he had thoracentesis (1.2L off), Paracentesis (1.6L off), and IR drainage of fluid collection 1/1. Broad spectrum antibiotics d/c'd 1/15. Chronically malnourished with poor PO intake, prealbumin 7. [place] tube placed 1/15 for tube feedings.  ERCP 1/17 with sludge and a biliary stone which was removed and stent exchanged. On TPN. Blood cx 1/17 and 1/18 NGTD. Liver biopsy 1/23 - without rejection.  EGD 1/25 - unremarkable for source of GI symptoms.  Resume diet along with other GI recommendations. SHANE tube placed for tube feeds.        Kidney history: Patient had Scr of 2.6 (with baseline at 1.0) on 9/27/17. His Scr improved to 1.3 with diuretic therapy on 10/2/17. He had another episode of LAURA prior to his liver transplant on 10/19/17, requiring CRRT intraop. He has been HD dependent since liver transplant for last 100 days (ESRD).  His UOP is almost 0. No kidney biopsy has been performed.  Transplant nephrology was asked to evaluate the patient for  kidney transplant candidacy. Given his ESRD, patient meets the criteria, however Patient's hospitalization course has been complicated by various medical issues.      Past Medical, Surgical, Family, and Social History:   Unchanged from H&P.    Scheduled Meds:   atovaquone  1,500 mg Oral Daily    cycloSPORINE modified  125 mg Oral BID    docusate sodium  100 mg Oral BID    epoetin maurisio (PROCRIT) injection  10,000 Units Intravenous Every Tues, Thurs, Sat    heparin (porcine)  5,000 Units Subcutaneous Q8H    levETIRAcetam  500 mg Oral BID    levothyroxine  75 mcg Oral Before breakfast    metoclopramide HCl  5 mg Oral QID (AC & HS)    omeprazole  40 mg Oral BID AC    piperacillin-tazobactam (ZOSYN) IVPB  2.25 g Intravenous Q12H    polyethylene glycol  17 g Oral BID    psyllium husk (aspartame)  3.4 g Oral BID    ursodiol  300 mg Oral BID     Continuous Infusions:  PRN Meds:sodium chloride, acetaminophen, bisacodyl, heparin (porcine), omnipaque, ondansetron, ondansetron, oxyCODONE, prochlorperazine, simethicone, sodium chloride 0.9%    Intake/Output - Last 3 Shifts       01/29 0700 - 01/30 0659 01/30 0700 - 01/31 0659 01/31 0700 - 02/01 0659    P.O. 1040 90 80    I.V. (mL/kg)  400 (6)     Blood  306.7     Other 600  600    NG/GT  30     IV Piggyback  250 250    Total Intake(mL/kg) 1640 (24.2) 1076.7 (16.1) 930 (13.9)    Urine (mL/kg/hr) 0 (0) 0 (0) 0 (0)    Emesis/NG output  0 (0) 0 (0)    Other 600 (0.4) 0 (0) 3000 (3.3)    Stool 0 (0) 0 (0) 0 (0)    Blood  0 (0) 0 (0)    Total Output 600 0 3000    Net +1040 +1076.7 -2070           Urine Occurrence 0 x 0 x 0 x    Stool Occurrence 0 x 2 x 0 x    Emesis Occurrence 0 x 0 x 0 x           Review of Systems   Constitutional: Positive for activity change, appetite change, chills, fatigue and fever.   HENT: Negative.  Negative for congestion and facial swelling.    Eyes: Negative for pain, discharge and visual disturbance.   Respiratory: Negative for cough,  "shortness of breath and wheezing.    Cardiovascular: Negative for chest pain and leg swelling.   Gastrointestinal: Positive for abdominal distention, abdominal pain and constipation. Negative for nausea and vomiting.   Genitourinary: Positive for decreased urine volume. Negative for difficulty urinating.        Anuric   Musculoskeletal: Negative for arthralgias, back pain and myalgias.   Skin: Positive for color change and wound. Negative for rash.   Allergic/Immunologic: Positive for immunocompromised state.   Neurological: Negative for dizziness, seizures, weakness and headaches.   Psychiatric/Behavioral: Positive for decreased concentration and dysphoric mood. Negative for confusion. The patient is not nervous/anxious.    All other systems reviewed and are negative.         Objective:     Vital Signs (Most Recent):  Temp: (!) 100.4 °F (38 °C) (01/31/18 1917)  Pulse: (!) 113 (01/31/18 1917)  Resp: 17 (01/31/18 1917)  BP: 129/74 (01/31/18 1917)  SpO2: 95 % (01/31/18 1917) Vital Signs (24h Range):  Temp:  [98.7 °F (37.1 °C)-102.4 °F (39.1 °C)] 100.4 °F (38 °C)  Pulse:  [] 113  Resp:  [16-19] 17  SpO2:  [94 %-97 %] 95 %  BP: (116-148)/(67-90) 129/74     Weight: 66.8 kg (147 lb 4.3 oz)  Height: 5' 6" (167.6 cm)  Body mass index is 23.77 kg/m².    Physical Exam   Constitutional: He is oriented to person, place, and time. He appears well-developed. No distress.   Temporal and distal extremity muscle wasting   HENT:   Head: Normocephalic and atraumatic.   Mouth/Throat: No oropharyngeal exudate.   dylan tube   Eyes: EOM are normal. Pupils are equal, round, and reactive to light. Scleral icterus is present.   Neck: Normal range of motion. Neck supple. No JVD present. No thyromegaly present.   Cardiovascular: Normal rate, regular rhythm, normal heart sounds and intact distal pulses.    No murmur heard.  Pulmonary/Chest: Effort normal. No respiratory distress. He has decreased breath sounds in the right middle field, the " right lower field and the left lower field. He has no wheezes. He exhibits no tenderness.   Diminished to RLL   Abdominal: Soft. Bowel sounds are normal. He exhibits ascites. He exhibits no distension. There is tenderness (mild). There is no rebound and no guarding.   Dressing to chevron.  Wd vac removed 1/12/18  Dependent edema present R flank; surrounding erythema   Musculoskeletal: Normal range of motion. He exhibits edema (2+ LE edema). He exhibits no tenderness.   Neurological: He is alert and oriented to person, place, and time. He has normal reflexes.   Skin: Skin is warm and dry. Capillary refill takes 2 to 3 seconds. He is not diaphoretic. No erythema.   jaundice   Psychiatric: He has a normal mood and affect. His behavior is normal. Judgment and thought content normal. His mood appears not anxious.   Depressed mood   Nursing note and vitals reviewed.

## 2018-02-01 NOTE — ASSESSMENT & PLAN NOTE
- CXR in ER with large right pleural effusion with subjective c/o worsening SOB  - thoracentesis completed- 1200 ml removed.  SOB with significant improvement.   - Cell count reviewed and negative for infection.   - resp even and non labored.  O2 sat 94-98%.  - Repeat thora 1/19 negative for infection per cell count  - Reaccumulating fluid on xray 1/21.  - thoracentesis 1/31 - 1.4L removed, negative for infection

## 2018-02-01 NOTE — HPI
Jhonny Diana is a 26 y/o male with past medical history of alcoholic cirrhosis.  S/p DDLT 10/19/2017; c/b seizures (swtiched off prograf to cyclo), ARF requiring HD (M-W-F, superficial wound infection s/p wound vac to chevron incision, and multiple admissions for fevers on 11/21, readmitted 11/24 again with fever, and 12/3. Found to have peritonitis in November (WBC 5000, 75% PNM) neg for bile leak. He was treated initially with vanc/cefepime.  He has undergone multiple paracenteses as well as abscess drainage of perihepatic fluid collections and treated with antimicrobial therapy but no positive cultures. Of note, biliary stricture also identified and ERCP performed on 12/6/2017 with sphincterotomy and biliary stent placed. Liver tests still have not normalized despite intervention, bilirubin and AP remain elevated. Other pertinent PMH current wound vac in place 2/2 wound infection, malnutrition requiring TPN.  In this admission, he had thoracentesis (1.2L off), Paracentesis (1.6L off), and IR drainage of fluid collection 1/1. Broad spectrum antibiotics d/c'd 1/15. Chronically malnourished with poor PO intake, prealbumin 7. [place] tube placed 1/15 for tube feedings.  ERCP 1/17 with sludge and a biliary stone which was removed and stent exchanged. Blood cx 1/17 and 1/18 NGTD. Liver biopsy 1/23 - without rejection.  EGD 1/25 - unremarkable for source of GI symptoms.       Kidney history: Patient had Scr of 2.6 (with baseline at 1.0) on 9/27/17. His Scr improved to 1.3 with diuretic therapy on 10/2/17. He had another episode of LAURA prior to his liver transplant on 10/19/17, requiring CRRT intraop. He has been HD dependent since liver transplant for last 100 days (ESRD).  His UOP is almost 0. No kidney biopsy has been performed.  Transplant nephrology was asked to evaluate the patient for kidney transplant candidacy. Given his ESRD, patient meets the criteria, however Patient's hospitalization course has been complicated  by various medical issues.

## 2018-02-01 NOTE — PLAN OF CARE
Problem: Patient Care Overview  Goal: Plan of Care Review  Outcome: Ongoing (interventions implemented as appropriate)  Pt had HD this AM with 1L removed. Also had a thora on R side with 1.4L removed. Pt returned to unit febrile at 102.1. Tylenol given. Pt's fleece blanket removed and temperature of room lowered to try to help break fever. Vanc and cefepime administered per order once patient returned to unit. Temp monitored frequently. Pain meds given x1. Pt free from falls and injury. [place] tube advanced per order 4 centimeters. Tube feeds held until KUB completed for placement verification. Await okay to restart tube feeds.

## 2018-02-01 NOTE — ASSESSMENT & PLAN NOTE
Nutrition Diagnosis  Inadequate oral intake    Related to (etiology):   Decreased appetite, abdominal distention    Signs and Symptoms (as evidenced by):   Pt eating 0% of meals and relying on enteral nutrition for 100% of EPN, EEN     Interventions/Recommendations (treatment strategy):  See recs    Nutrition Diagnosis Status:   New

## 2018-02-01 NOTE — PROGRESS NOTES
Pt temp at 2100 102.5- administered PO tylenol + notified Dr. COLLEEN Mcguire. Will continue to monitor and start scheduled Zosyn IVPB.

## 2018-02-01 NOTE — PROGRESS NOTES
[facility]  Kidney Transplant  Progress Note          Subjective:   History of Present Illness:  Jhonny Diana is a 28 y/o male with past medical history of alcoholic cirrhosis.  S/p DDLT 10/19/2017; c/b seizures (swtiched off prograf to cyclo), ARF requiring HD (M-W-F, superficial wound infection s/p wound vac to chevron incision, and multiple admissions for fevers on 11/21, readmitted 11/24 again with fever, and 12/3. Found to have peritonitis in November (WBC 5000, 75% PNM) neg for bile leak. He was treated initially with vanc/cefepime.  He has undergone multiple paracenteses as well as abscess drainage of perihepatic fluid collections and treated with antimicrobial therapy but no positive cultures. Of note, biliary stricture also identified and ERCP performed on 12/6/2017 with sphincterotomy and biliary stent placed. Liver tests still have not normalized despite intervention, bilirubin and AP remain elevated. Other pertinent PMH current wound vac in place 2/2 wound infection, malnutrition requiring TPN.  In this admission, he had thoracentesis (1.2L off), Paracentesis (1.6L off), and IR drainage of fluid collection 1/1. Broad spectrum antibiotics d/c'd 1/15. Chronically malnourished with poor PO intake, prealbumin 7. [place] tube placed 1/15 for tube feedings.  ERCP 1/17 with sludge and a biliary stone which was removed and stent exchanged.Blood cx 1/17 and 1/18 NGTD. Liver biopsy 1/23 - without rejection.  EGD 1/25 - unremarkable for source of GI symptoms.       Kidney history: Patient had Scr of 2.6 (with baseline at 1.0) on 9/27/17. His Scr improved to 1.3 with diuretic therapy on 10/2/17. He had another episode of LAURA prior to his liver transplant on 10/19/17, requiring CRRT intraop. He has been HD dependent since liver transplant for last 100 days (ESRD).  His UOP is almost 0. No kidney biopsy has been performed.  Transplant nephrology was asked to evaluate the patient for kidney transplant  candidacy. Given his ESRD, patient meets the criteria, however Patient's hospitalization course has been complicated by various medical issues.      Past Medical, Surgical, Family, and Social History:   Unchanged from H&P.    Scheduled Meds:   atovaquone  1,500 mg Oral Daily    cycloSPORINE modified  125 mg Oral BID    docusate sodium  100 mg Oral BID    epoetin maurisio (PROCRIT) injection  10,000 Units Intravenous Every Tues, Thurs, Sat    heparin (porcine)  5,000 Units Subcutaneous Q8H    levETIRAcetam  500 mg Oral BID    levothyroxine  75 mcg Oral Before breakfast    metoclopramide HCl  5 mg Oral QID (AC & HS)    omeprazole  40 mg Oral BID AC    piperacillin-tazobactam (ZOSYN) IVPB  2.25 g Intravenous Q12H    polyethylene glycol  17 g Oral BID    psyllium husk (aspartame)  3.4 g Oral BID    ursodiol  300 mg Oral BID     Continuous Infusions:  PRN Meds:sodium chloride, acetaminophen, bisacodyl, heparin (porcine), omnipaque, ondansetron, ondansetron, oxyCODONE, prochlorperazine, simethicone, sodium chloride 0.9%    Intake/Output - Last 3 Shifts       01/29 0700 - 01/30 0659 01/30 0700 - 01/31 0659 01/31 0700 - 02/01 0659    P.O. 1040 90 80    I.V. (mL/kg)  400 (6)     Blood  306.7     Other 600  600    NG/GT  30     IV Piggyback  250 250    Total Intake(mL/kg) 1640 (24.2) 1076.7 (16.1) 930 (13.9)    Urine (mL/kg/hr) 0 (0) 0 (0) 0 (0)    Emesis/NG output  0 (0) 0 (0)    Other 600 (0.4) 0 (0) 3000 (3.3)    Stool 0 (0) 0 (0) 0 (0)    Blood  0 (0) 0 (0)    Total Output 600 0 3000    Net +1040 +1076.7 -2070           Urine Occurrence 0 x 0 x 0 x    Stool Occurrence 0 x 2 x 0 x    Emesis Occurrence 0 x 0 x 0 x           Review of Systems   Constitutional: Positive for chills, fatigue and fever.   HENT: Negative.  Negative for congestion and facial swelling.    Eyes: Negative for pain, discharge and visual disturbance.   Respiratory: Negative for cough, shortness of breath and wheezing.    Cardiovascular:  "Negative for chest pain and leg swelling.   Gastrointestinal: Positive for abdominal distention, abdominal pain and constipation. Negative for nausea and vomiting.   Genitourinary: Negative for difficulty urinating. Anuric   Musculoskeletal: Negative for arthralgias, back pain and myalgias.   Skin: Positive for color change and wound. Negative for rash.   Allergic/Immunologic: Positive for immunocompromised state.   Neurological: Negative for dizziness, seizures, weakness and headaches.   Psychiatric/Behavioral:  Negative for confusion. The patient is not nervous/anxious.             Objective:     Vital Signs (Most Recent):  Temp: (!) 100.4 °F (38 °C) (01/31/18 1917)  Pulse: (!) 113 (01/31/18 1917)  Resp: 17 (01/31/18 1917)  BP: 129/74 (01/31/18 1917)  SpO2: 95 % (01/31/18 1917) Vital Signs (24h Range):  Temp:  [98.7 °F (37.1 °C)-102.4 °F (39.1 °C)] 100.4 °F (38 °C)  Pulse:  [] 113  Resp:  [16-19] 17  SpO2:  [94 %-97 %] 95 %  BP: (116-148)/(67-90) 129/74     Weight: 66.8 kg (147 lb 4.3 oz)  Height: 5' 6" (167.6 cm)  Body mass index is 23.77 kg/m².    Physical Exam   Constitutional:  Temporal and distal extremity muscle wasting   Eyes: EOM are normal. Pupils are equal, round, and reactive to light. Scleral icterus is present.   Cardiovascular: Normal rate, regular rhythm, normal heart sounds and intact distal pulses.    Pulmonary/Chest: Effort normal. No respiratory distress. He has decreased breath sounds. Diminished to RLL   Abdominal: Soft. Bowel sounds are normal. He exhibits ascites. He exhibits no distension. There is tenderness (mild). There is no rebound and no guarding.   Dressing to chevron.  Wd vac removed 1/12/18  Musculoskeletal: Normal range of motion. He exhibits edema (2+ LE edema). He exhibits no tenderness.   Psychiatric: He has a normal mood and affect. His behavior is normal. Judgment and thought content normal. His mood appears not anxious.   Depressed mood             Assessment/Plan: "     Kidney transplant candidate    Although based of his GFR and diagnosis of ESRD, patient meets criteria for kidney transplant, patient has had a very complicated hospital course.  I don't feel that patient is clinically stable enough to be considered for kidney transplant. I will discuss the case at selection meeting on Friday.              Kiesha Patton MD  Kidney Transplant  [facility]

## 2018-02-02 ENCOUNTER — COMMITTEE REVIEW (OUTPATIENT)
Dept: TRANSPLANT | Facility: CLINIC | Age: 28
End: 2018-02-02

## 2018-02-02 PROBLEM — J90 PLEURAL EFFUSION, RIGHT: Status: ACTIVE | Noted: 2018-02-02

## 2018-02-02 LAB
ABO + RH BLD: NORMAL
ALBUMIN SERPL BCP-MCNC: 1.8 G/DL
ALP SERPL-CCNC: 132 U/L
ALT SERPL W/O P-5'-P-CCNC: 7 U/L
ANION GAP SERPL CALC-SCNC: 13 MMOL/L
AST SERPL-CCNC: 25 U/L
BACTERIA BLD CULT: NORMAL
BASOPHILS # BLD AUTO: 0.01 K/UL
BASOPHILS NFR BLD: 0.2 %
BILIRUB SERPL-MCNC: 3.3 MG/DL
BLD GP AB SCN CELLS X3 SERPL QL: NORMAL
BLD PROD TYP BPU: NORMAL
BLD PROD TYP BPU: NORMAL
BLOOD UNIT EXPIRATION DATE: NORMAL
BLOOD UNIT EXPIRATION DATE: NORMAL
BLOOD UNIT TYPE CODE: 5100
BLOOD UNIT TYPE CODE: 5100
BLOOD UNIT TYPE: NORMAL
BLOOD UNIT TYPE: NORMAL
BUN SERPL-MCNC: 32 MG/DL
CALCIUM SERPL-MCNC: 9.1 MG/DL
CHLORIDE SERPL-SCNC: 100 MMOL/L
CMV DNA SERPL NAA+PROBE-ACNC: 370 IU/ML
CO2 SERPL-SCNC: 23 MMOL/L
CODING SYSTEM: NORMAL
CODING SYSTEM: NORMAL
CREAT SERPL-MCNC: 4.6 MG/DL
CYCLOSPORINE BLD LC/MS/MS-MCNC: 188 NG/ML
DIFFERENTIAL METHOD: ABNORMAL
DISPENSE STATUS: NORMAL
DISPENSE STATUS: NORMAL
EOSINOPHIL # BLD AUTO: 0.2 K/UL
EOSINOPHIL NFR BLD: 3.5 %
ERYTHROCYTE [DISTWIDTH] IN BLOOD BY AUTOMATED COUNT: 16.1 %
EST. GFR  (AFRICAN AMERICAN): 18.6 ML/MIN/1.73 M^2
EST. GFR  (NON AFRICAN AMERICAN): 16.1 ML/MIN/1.73 M^2
GALACTOMANNAN AG SERPL IA-ACNC: <0.5 INDEX
GLUCOSE SERPL-MCNC: 97 MG/DL
HBV SURFACE AG SERPL QL IA: NEGATIVE
HCT VFR BLD AUTO: 21.4 %
HGB BLD-MCNC: 7 G/DL
IMM GRANULOCYTES # BLD AUTO: 0.12 K/UL
IMM GRANULOCYTES NFR BLD AUTO: 2.8 %
LYMPHOCYTES # BLD AUTO: 0.7 K/UL
LYMPHOCYTES NFR BLD: 16.4 %
MAGNESIUM SERPL-MCNC: 1.5 MG/DL
MCH RBC QN AUTO: 31.1 PG
MCHC RBC AUTO-ENTMCNC: 32.7 G/DL
MCV RBC AUTO: 95 FL
MONOCYTES # BLD AUTO: 0.5 K/UL
MONOCYTES NFR BLD: 11 %
NEUTROPHILS # BLD AUTO: 2.8 K/UL
NEUTROPHILS NFR BLD: 66.1 %
NRBC BLD-RTO: 0 /100 WBC
PHOSPHATE SERPL-MCNC: 3 MG/DL
PLATELET # BLD AUTO: 110 K/UL
PMV BLD AUTO: 10.5 FL
POTASSIUM SERPL-SCNC: 3.7 MMOL/L
PROT SERPL-MCNC: 5.4 G/DL
RBC # BLD AUTO: 2.25 M/UL
SODIUM SERPL-SCNC: 136 MMOL/L
TRANS ERYTHROCYTES VOL PATIENT: NORMAL ML
TRANS ERYTHROCYTES VOL PATIENT: NORMAL ML
WBC # BLD AUTO: 4.26 K/UL

## 2018-02-02 PROCEDURE — 63600175 PHARM REV CODE 636 W HCPCS: Performed by: NURSE PRACTITIONER

## 2018-02-02 PROCEDURE — 25000003 PHARM REV CODE 250: Performed by: NURSE PRACTITIONER

## 2018-02-02 PROCEDURE — 94664 DEMO&/EVAL PT USE INHALER: CPT

## 2018-02-02 PROCEDURE — 86920 COMPATIBILITY TEST SPIN: CPT

## 2018-02-02 PROCEDURE — 94640 AIRWAY INHALATION TREATMENT: CPT

## 2018-02-02 PROCEDURE — 25000003 PHARM REV CODE 250: Performed by: PHYSICIAN ASSISTANT

## 2018-02-02 PROCEDURE — 90935 HEMODIALYSIS ONE EVALUATION: CPT

## 2018-02-02 PROCEDURE — 87340 HEPATITIS B SURFACE AG IA: CPT

## 2018-02-02 PROCEDURE — 20600001 HC STEP DOWN PRIVATE ROOM

## 2018-02-02 PROCEDURE — 25000003 PHARM REV CODE 250: Performed by: SURGERY

## 2018-02-02 PROCEDURE — 96372 THER/PROPH/DIAG INJ SC/IM: CPT

## 2018-02-02 PROCEDURE — 63600175 PHARM REV CODE 636 W HCPCS: Performed by: HOSPITALIST

## 2018-02-02 PROCEDURE — 63600175 PHARM REV CODE 636 W HCPCS: Mod: JG | Performed by: SURGERY

## 2018-02-02 PROCEDURE — 94761 N-INVAS EAR/PLS OXIMETRY MLT: CPT

## 2018-02-02 PROCEDURE — 83735 ASSAY OF MAGNESIUM: CPT

## 2018-02-02 PROCEDURE — 85025 COMPLETE CBC W/AUTO DIFF WBC: CPT

## 2018-02-02 PROCEDURE — 25000242 PHARM REV CODE 250 ALT 637 W/ HCPCS: Performed by: NURSE PRACTITIONER

## 2018-02-02 PROCEDURE — 84100 ASSAY OF PHOSPHORUS: CPT

## 2018-02-02 PROCEDURE — 80053 COMPREHEN METABOLIC PANEL: CPT

## 2018-02-02 PROCEDURE — 90935 HEMODIALYSIS ONE EVALUATION: CPT | Mod: ,,, | Performed by: NURSE PRACTITIONER

## 2018-02-02 PROCEDURE — 99233 SBSQ HOSP IP/OBS HIGH 50: CPT | Mod: ,,, | Performed by: NURSE PRACTITIONER

## 2018-02-02 PROCEDURE — 86901 BLOOD TYPING SEROLOGIC RH(D): CPT

## 2018-02-02 PROCEDURE — 99233 SBSQ HOSP IP/OBS HIGH 50: CPT | Mod: ,,, | Performed by: INTERNAL MEDICINE

## 2018-02-02 PROCEDURE — 80158 DRUG ASSAY CYCLOSPORINE: CPT

## 2018-02-02 PROCEDURE — 63600175 PHARM REV CODE 636 W HCPCS: Performed by: PHYSICIAN ASSISTANT

## 2018-02-02 RX ORDER — CYCLOSPORINE 100 MG/ML
150 SOLUTION ORAL 2 TIMES DAILY
Status: DISCONTINUED | OUTPATIENT
Start: 2018-02-02 | End: 2018-02-03

## 2018-02-02 RX ORDER — SODIUM CHLORIDE 9 MG/ML
INJECTION, SOLUTION INTRAVENOUS ONCE
Status: COMPLETED | OUTPATIENT
Start: 2018-02-02 | End: 2018-02-02

## 2018-02-02 RX ORDER — METOCLOPRAMIDE HYDROCHLORIDE 5 MG/5ML
10 SOLUTION ORAL
Status: DISCONTINUED | OUTPATIENT
Start: 2018-02-02 | End: 2018-02-13

## 2018-02-02 RX ORDER — LEVETIRACETAM 100 MG/ML
500 SOLUTION ORAL 2 TIMES DAILY
Status: DISCONTINUED | OUTPATIENT
Start: 2018-02-02 | End: 2018-04-09

## 2018-02-02 RX ORDER — IPRATROPIUM BROMIDE AND ALBUTEROL SULFATE 2.5; .5 MG/3ML; MG/3ML
3 SOLUTION RESPIRATORY (INHALATION)
Status: DISCONTINUED | OUTPATIENT
Start: 2018-02-02 | End: 2018-02-12

## 2018-02-02 RX ADMIN — ONDANSETRON HYDROCHLORIDE 4 MG: 2 INJECTION, SOLUTION INTRAMUSCULAR; INTRAVENOUS at 01:02

## 2018-02-02 RX ADMIN — ACETAMINOPHEN 650 MG: 325 TABLET, FILM COATED ORAL at 04:02

## 2018-02-02 RX ADMIN — CYCLOSPORINE 125 MG: 100 CAPSULE, LIQUID FILLED ORAL at 04:02

## 2018-02-02 RX ADMIN — HEPARIN SODIUM 5000 UNITS: 5000 INJECTION, SOLUTION INTRAVENOUS; SUBCUTANEOUS at 09:02

## 2018-02-02 RX ADMIN — ISAVUCONAZONIUM SULFATE 372 MG: 186 CAPSULE ORAL at 01:02

## 2018-02-02 RX ADMIN — HEPARIN SODIUM 5000 UNITS: 5000 INJECTION, SOLUTION INTRAVENOUS; SUBCUTANEOUS at 04:02

## 2018-02-02 RX ADMIN — LEVOTHYROXINE SODIUM 75 MCG: 75 TABLET ORAL at 04:02

## 2018-02-02 RX ADMIN — OMEPRAZOLE 40 MG: 40 CAPSULE, DELAYED RELEASE ORAL at 04:02

## 2018-02-02 RX ADMIN — DOCUSATE SODIUM 100 MG: 100 CAPSULE, LIQUID FILLED ORAL at 04:02

## 2018-02-02 RX ADMIN — SODIUM CHLORIDE: 0.9 INJECTION, SOLUTION INTRAVENOUS at 09:02

## 2018-02-02 RX ADMIN — ATOVAQUONE 1500 MG: 750 SUSPENSION ORAL at 01:02

## 2018-02-02 RX ADMIN — ONDANSETRON HYDROCHLORIDE 4 MG: 2 INJECTION, SOLUTION INTRAMUSCULAR; INTRAVENOUS at 03:02

## 2018-02-02 RX ADMIN — OMEPRAZOLE 40 MG: 40 CAPSULE, DELAYED RELEASE ORAL at 06:02

## 2018-02-02 RX ADMIN — METOCLOPRAMIDE HYDROCHLORIDE 10 MG: 5 SOLUTION ORAL at 06:02

## 2018-02-02 RX ADMIN — ISAVUCONAZONIUM SULFATE: 74.4 INJECTION, POWDER, LYOPHILIZED, FOR SOLUTION INTRAVENOUS at 09:02

## 2018-02-02 RX ADMIN — METOCLOPRAMIDE HYDROCHLORIDE 10 MG: 5 SOLUTION ORAL at 09:02

## 2018-02-02 RX ADMIN — ISAVUCONAZONIUM SULFATE 372 MG: 186 CAPSULE ORAL at 04:02

## 2018-02-02 RX ADMIN — HEPARIN SODIUM 1000 UNITS: 1000 INJECTION, SOLUTION INTRAVENOUS; SUBCUTANEOUS at 12:02

## 2018-02-02 RX ADMIN — URSODIOL 300 MG: 300 CAPSULE ORAL at 04:02

## 2018-02-02 RX ADMIN — PIPERACILLIN AND TAZOBACTAM 2.25 G: 4; .5 INJECTION, POWDER, LYOPHILIZED, FOR SOLUTION INTRAVENOUS; PARENTERAL at 01:02

## 2018-02-02 RX ADMIN — IPRATROPIUM BROMIDE AND ALBUTEROL SULFATE 3 ML: .5; 3 SOLUTION RESPIRATORY (INHALATION) at 07:02

## 2018-02-02 RX ADMIN — METOCLOPRAMIDE 10 MG: 10 TABLET ORAL at 04:02

## 2018-02-02 RX ADMIN — OXYCODONE HYDROCHLORIDE 5 MG: 5 TABLET ORAL at 03:02

## 2018-02-02 RX ADMIN — OXYCODONE HYDROCHLORIDE 5 MG: 5 TABLET ORAL at 11:02

## 2018-02-02 RX ADMIN — LEVETIRACETAM 500 MG: 500 TABLET ORAL at 01:02

## 2018-02-02 RX ADMIN — URSODIOL 300 MG: 300 CAPSULE ORAL at 09:02

## 2018-02-02 RX ADMIN — PSYLLIUM HUSK 1 PACKET: 3.4 POWDER ORAL at 09:02

## 2018-02-02 RX ADMIN — OXYCODONE HYDROCHLORIDE 5 MG: 5 TABLET ORAL at 04:02

## 2018-02-02 RX ADMIN — POLYETHYLENE GLYCOL 3350 17 G: 17 POWDER, FOR SOLUTION ORAL at 09:02

## 2018-02-02 RX ADMIN — PIPERACILLIN AND TAZOBACTAM 2.25 G: 4; .5 INJECTION, POWDER, LYOPHILIZED, FOR SOLUTION INTRAVENOUS; PARENTERAL at 09:02

## 2018-02-02 RX ADMIN — LEVETIRACETAM 500 MG: 100 SOLUTION ORAL at 09:02

## 2018-02-02 RX ADMIN — OXYCODONE HYDROCHLORIDE 5 MG: 5 TABLET ORAL at 07:02

## 2018-02-02 RX ADMIN — IPRATROPIUM BROMIDE AND ALBUTEROL SULFATE 3 ML: .5; 3 SOLUTION RESPIRATORY (INHALATION) at 03:02

## 2018-02-02 RX ADMIN — CYCLOSPORINE 150 MG: 100 SOLUTION ORAL at 06:02

## 2018-02-02 NOTE — PLAN OF CARE
Problem: Patient Care Overview  Goal: Plan of Care Review  Pt aaox4 vswnl and c/o severe pain. Pain meds given per md order and extra dose oxy ir per md on call and moderate relief pain. Bed in low position and callbell within reach and mother at bedside. Pt off floor for Ct and tube feeds resumed at 45cc/hr novasource renal per keofeed tube. Pt with temps 100.8 and tylenol given per md order. Last bl cx on 1/30. Pt ambulates with walker and standby assist. cxr showed plural effusion and pt requesting 2liters o2 with sats 92% on room air and 97% on 2liters. Standard precautions and reverse isolation maintained. Enrike drsg d/i with drsg change due 02/02. Pt planned for dialysis on Friday.

## 2018-02-02 NOTE — PROGRESS NOTES
[facility]  HEMODIALYSIS NOTE     Patient currently on hemodialysis for removal of uremic toxins and volume.     Patient seen and evaluated on hemodialysis, tolerating treatment, see HD flowsheet for vitals and assessments.      Ultrafiltration goal is 1-2L     Labs have been reviewed and the dialysate bath has been adjusted.     Assessment/Plan:  -Seen on dialysis treatment and tolerating UF goal without problems.    -continue VIDA with dialysis  -no need for binders at this time.    LANE Beard, FNP-BC  Nephrology  Pager:  [phone]

## 2018-02-02 NOTE — PROGRESS NOTES
[facility]  Infectious Disease  Progress Note    Patient Name: Jhonny Diana  MRN: [id number]  Admission Date: 1/11/2018  Length of Stay: 22 days  Attending Physician: Nathanael Medina MD  Primary Care Provider: Primary Doctor No    Isolation Status: No active isolations  Assessment/Plan:      Fever    28 y/o male with a history of alcoholic hepatitis  - s/p liver transplant  10/19/17  - CMV D+/R+  - induction: steroids  - maintenance: cyclosporine  - OI prophylaxis: isavuconazole  - complications: seizures, LAURA, superificial wound infection, several admissions with culture negative peritonitis due to suspected indolent biliary leakage   - ID following for fever  - ERCP 1/30/18: one stent removed from biliary tree, Two plastic stents were placed into the common bile duct  - Chest/A/P CT 1/28/18: Moderate sized right pleural effusion, moderate amount of partially loculated intraperitoneal fluid throughout the abdomen, largest pocket in the right lower quadrant/ pelvis  - thoracentesis 2/1/18: 130 wbc (34% N), Protein ratio 0.5, culture NGTD  - B/C 2/1/18 NGTD  - CT sinus 2/1/18: negative   - CT chest A/P 2/1/18: Increased patchy and confluent consolidative change with air bronchograms involving the right middle lobe and left lower lobe   - suspect cause of fever is from intraabdominal source (IA fluid collections)     Recommendations:  - continue zosyn                   Anticipated Disposition: pending     Thank you for your consult. I will follow-up with patient. Please contact us if you have any additional questions.    Rk Che MD  Infectious Disease Fellow, PGY-5  Spectra: [phone]  Pager: [phone]  [facility]    Subjective:     Principal Problem:Fever    HPI: No notes on file  Interval History: ID called back today due to fever    - ERCP 1/30/18: one stent removed from biliary tree, Two plastic stents were placed into the common bile duct    - Chest/A/P CT 1/28/18:  Moderate sized right pleural effusion with near-complete collapse of the right lower lobe, slightly improved from the 1/18/18 exam.   Moderate amount of partially loculated intraperitoneal fluid throughout the abdomen, largest pocket in the right lower quadrant/ pelvis, also overall slightly improved.    - thoracentesis 2/1/18: 130 wbc (34% N), Protein ratio 0.5    Review of Systems   Constitutional: Positive for fatigue. Negative for chills and fever.   HENT: Negative for sore throat.    Respiratory: Negative for cough, chest tightness and shortness of breath.    Cardiovascular: Negative for chest pain, palpitations and leg swelling.   Gastrointestinal: Positive for abdominal distention and abdominal pain. Negative for diarrhea, nausea and vomiting.   Genitourinary: Negative for dysuria, flank pain and urgency.   Skin: Negative for rash.   Neurological: Negative for dizziness, light-headedness and numbness.   Psychiatric/Behavioral: Negative for confusion.     Objective:     Vital Signs (Most Recent):  Temp: 99.3 °F (37.4 °C) (02/02/18 1330)  Pulse: 96 (02/02/18 1514)  Resp: 20 (02/02/18 1514)  BP: 134/79 (02/02/18 1240)  SpO2: 98 % (02/02/18 1514) Vital Signs (24h Range):  Temp:  [98.5 °F (36.9 °C)-101.9 °F (38.8 °C)] 99.3 °F (37.4 °C)  Pulse:  [] 96  Resp:  [16-22] 20  SpO2:  [92 %-98 %] 98 %  BP: (122-134)/(70-81) 134/79     Weight: 65.3 kg (143 lb 14.4 oz)  Body mass index is 23.23 kg/m².    Estimated Creatinine Clearance: 21.6 mL/min (based on SCr of 4.6 mg/dL (H)).    Physical Exam   Constitutional: He is oriented to person, place, and time.   HENT:   Mouth/Throat: No oropharyngeal exudate.   Eyes: No scleral icterus.   Cardiovascular: Regular rhythm.  Tachycardia present.    Pulmonary/Chest: Effort normal and breath sounds normal. No respiratory distress. He has no wheezes. He has no rales.   Abdominal: Soft. There is no tenderness. There is no rebound.   Surgical incision wrapped   Musculoskeletal: He  exhibits edema.   Lymphadenopathy:     He has no cervical adenopathy.   Neurological: He is alert and oriented to person, place, and time. No cranial nerve deficit.   Skin: He is not diaphoretic.       Significant Labs:   Bilirubin:   Recent Labs  Lab 01/05/18  0805  01/12/18  0627  01/29/18  0430 01/30/18  0448 01/31/18  0423 02/01/18  0445 02/02/18  0436   BILIDIR 2.2*  --  2.0*  --   --   --   --   --   --    BILITOT 3.2*  < > 2.7*  < > 4.4* 4.2* 5.1* 4.0* 3.3*   < > = values in this interval not displayed.  Blood Culture:     Recent Labs  Lab 01/17/18  1651 01/18/18  1922 01/18/18  1927 01/28/18  1122 01/30/18  1916   LABBLOO No growth after 5 days. No growth after 5 days. No growth after 5 days. No growth after 5 days. No Growth to date  No Growth to date  No Growth to date  No Growth to date  No Growth to date  No Growth to date     BMP:     Recent Labs  Lab 02/02/18  0436   GLU 97      K 3.7      CO2 23   BUN 32*   CREATININE 4.6*   CALCIUM 9.1   MG 1.5*     CBC:     Recent Labs  Lab 02/01/18 0445 02/02/18  0436   WBC 3.46* 4.26   HGB 7.2* 7.0*   HCT 21.4* 21.4*   * 110*     CMP:     Recent Labs  Lab 02/01/18 0445 02/02/18  0436    136   K 3.4* 3.7    100   CO2 28 23    97   BUN 20 32*   CREATININE 3.4* 4.6*   CALCIUM 9.1 9.1   PROT 5.7* 5.4*   ALBUMIN 2.0* 1.8*   BILITOT 4.0* 3.3*   ALKPHOS 158* 132   AST 29 25   ALT 6* 7*   ANIONGAP 9 13   EGFRNONAA 23.2* 16.1*     Microbiology Results (last 7 days)     Procedure Component Value Units Date/Time    Blood culture [[id number]] Collected:  01/28/18 1122    Order Status:  Completed Specimen:  Blood Updated:  02/02/18 1412     Blood Culture, Routine No growth after 5 days.    Culture, Anaerobic [[id number]] Collected:  01/31/18 1344    Order Status:  Completed Specimen:  Body Fluid from Pleural Fluid Updated:  02/02/18 0908     Anaerobic Culture Culture in progress    Blood culture [[id number]] Collected:  01/30/18 1916     Order Status:  Completed Specimen:  Blood Updated:  02/01/18 2212     Blood Culture, Routine No Growth to date     Blood Culture, Routine No Growth to date     Blood Culture, Routine No Growth to date    Blood culture [[id number]] Collected:  01/30/18 1916    Order Status:  Completed Specimen:  Blood Updated:  02/01/18 2212     Blood Culture, Routine No Growth to date     Blood Culture, Routine No Growth to date     Blood Culture, Routine No Growth to date    Respiratory Viral Panel by PCR [facility]; Nasal Swab [[id number]] Collected:  02/01/18 1552    Order Status:  Sent Specimen:  Respiratory Updated:  02/01/18 1553    Culture, Body Fluid (Aerobic) w/ GS [[id number]] Collected:  01/31/18 1344    Order Status:  Completed Specimen:  Body Fluid from Pleural Fluid Updated:  01/31/18 2156     Gram Stain Result Rare WBC's      No organisms seen    Fungus culture [[id number]] Collected:  01/31/18 1344    Order Status:  Sent Specimen:  Body Fluid from Pleural Fluid Updated:  01/31/18 1540    Blood culture [[id number]]     Order Status:  Canceled Specimen:  Blood     Blood culture [[id number]]     Order Status:  Canceled Specimen:  Blood     Blood culture [[id number]]     Order Status:  Canceled Specimen:  Blood     Urine culture [[id number]]     Order Status:  Canceled Specimen:  Urine           Significant Imaging: I have reviewed all pertinent imaging results/findings within the past 24 hours.

## 2018-02-02 NOTE — ASSESSMENT & PLAN NOTE
28 y/o male with a history of alcoholic hepatitis  - s/p liver transplant  10/19/17  - CMV D+/R+  - induction: steroids  - maintenance: cyclosporine  - OI prophylaxis: isavuconazole  - complications: seizures, LAURA, superificial wound infection, several admissions with culture negative peritonitis due to suspected indolent biliary leakage   - ID following for fever  - ERCP 1/30/18: one stent removed from biliary tree, Two plastic stents were placed into the common bile duct  - Chest/A/P CT 1/28/18: Moderate sized right pleural effusion, moderate amount of partially loculated intraperitoneal fluid throughout the abdomen, largest pocket in the right lower quadrant/ pelvis  - thoracentesis 2/1/18: 130 wbc (34% N), Protein ratio 0.5, culture NGTD  - B/C 2/1/18 NGTD  - CT sinus 2/1/18: negative   - CT chest A/P 2/1/18: Increased patchy and confluent consolidative change with air bronchograms involving the right middle lobe and left lower lobe   - suspect cause of fever is from intraabdominal source (IA fluid collections)     Recommendations:  - continue zosyn

## 2018-02-02 NOTE — PLAN OF CARE
Problem: Patient Care Overview  Goal: Plan of Care Review  Outcome: Ongoing (interventions implemented as appropriate)  Pt aao x 4. Pt currently in bed with bed in lowest/locked position. Call light in reach. Free from falls/injury this shift and wearing non-skid footwear. Mother at bedside. Pt appetite continuous to be poor,TF continuously @ 45 cc/hr. Medications modified to oral solutions. PO oxy 5 mg given for c/o pain. IV zofran given for c/o n/v. Tmax 100.3.     Will continue to monitor, assess, and adjust care as needed.

## 2018-02-02 NOTE — ASSESSMENT & PLAN NOTE
- Pt with tmax overnight 102.5   - Started on vanc/cefepime for ERCP. Now transitioned to vanc/zosyn   - Blood cultures sent, prelim NGTD.   - thoracentesis 2/1 - fluid negative for infection  - atelectasis noted on ct scan - start breathing tx & CPT

## 2018-02-02 NOTE — PROGRESS NOTES
Transverse abdominal wound follow up     02/02/18 1700       Incision/Site 10/30/17 0911 abdomen transverse   Date First Assessed/Time First Assessed: 10/30/17 0911   Present Prior to Hospital Arrival?: Yes  Location: abdomen  Orientation: transverse   Wound Image     Incision WDL ex   Dressing Appearance Dry;Intact   Drainage Amount Scant   Drainage Characteristics/Odor Yellow   Appearance Pink;Red;Moist   Red (%), Wound Tissue Color 100 %   Periwound Area Dry;Scar tissue   Wound Edges Open   Wound Length (cm) 1   Wound Width (cm) 8.5   Depth (cm) 0.1   Care Cleansed with:;Sterile normal saline   Dressing Foam     Continue present treatment  Zoë Murphy RN CWON  [id number]

## 2018-02-02 NOTE — PROGRESS NOTES
[facility]  Liver Transplant  Progress Note    Patient Name: Jhonny Diana  MRN: [id number]  Admission Date: 2018  Hospital Length of Stay: 22 days  Code Status: Full Code  Primary Care Provider: Primary Doctor No  Post-Operative Day: 106    ORGAN:   LIVER  Disease Etiology: Acute Alcoholic Hepatitis  Donor Type:    - Brain Death  CDC High Risk:   No  Donor CMV Status:   Donor CMV Status: Positive  Donor HBcAB:   Negative  Donor HCV Status:   Negative  Whole or Partial: Whole Liver  Biliary Anastomosis: End to End  Arterial Anatomy: Standard  Subjective:     History of Present Illness:  Jhonny Diana is a 26 y/o male with past medical history of alcoholic cirrhosis.  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 Found to have peritonitis in November (WBC 5000, 75% PNM) neg for bile leak. He was treated initially with vanc/cefepime. Repeat cell counts  with some improvement (WBC 1400, 45% PNM). He has undergone multiple paracenteses as well as abscess drainage of perihepatic fluid collections and treated with antimicrobial therapy but no positive cultures. Of note, biliary stricture also identified and ERCP performed on 2017 with sphincterotomy and biliary stent placed. Liver tests still have not normalized despite intervention, bilirubin and AP remain elevated. Other pertinent PMH current wound vac in place 2/2 wound infection, malnutrition requiring TPN for short course and ongoing hypoalbuminemia, and seizure activity while on prograf and has since been switched to cyclosporine without reoccurrence.  He presented to the ER for fever, abdominal pain, and N/V. He reports fever (103) for 1  day prior. Overnight, he developed N/V, reports small amount of green emesis with new left sided pain. He also endorses worsening SOB with exertion. He was scheduled as an outpatient for follow up paracentesis and IR drainage of fluid collection. CXR in ER shows large pleural effusion with subsegmental atelectasis. Infectious work up initiated in ER. His ANC is 900. Broad spectrum antibiotics initiated in ED. He denies chest pain, palpitations, diarrhea, constipation, or back pain. Denies any sick contacts.    Hospital Course:  Thoracentesis (1.2L off), Paracentesis (1.6L off), and IR drainage of fluid collection 1/11, all fluids negative for infection. ID consulted. Broad spectrum antibiotics d/c'd 1/15. Chronically malnourished with poor PO intake, prealbumin 7. Shane tube placed 1/15 for tube feedings.     ERCP 1/17 with sludge and a biliary stone which was removed and stent exchanged.  Remained with n/v; therefore, TF remained on hold and TPN continued.   Pt with fever s/p ERCP on 1/17 which continued until 1/19.  Vanc/cefepime restarted.  Blood cx 1/17 and 1/18 NGTD.  ID reconsulted.  Fungal markers sent.  CT C/A/P obtained.  With large R pleural effusion and ascites- both drained 1/19 and negative for infx per cell count. Pt afebrile 1/20. Liver biopsy 1/23 - without rejection.  EGD 1/25 - unremarkable for source of GI symptoms.  Resume diet along with other GI recommendations. SHANE tube placed for tube feeds.       Interval History:  Fever curve improved with current antibiotics.  ID consulted - appreciate recs.  Antibiotic coverage broaden with both vancomycin and zosyn.  ERCP 1/30 revealed stones and sludge. Stents placed. Tbili slowly improving, will watch trend. Amylase/lipase checked overnight, neg for post ERCP pancreatitis. Pt still with generalized abdominal pain although not worsening. Thoracentesis 1/31 with 1.4L removed - fluid negative for infection.  Ct scan reviewed.  Sent aspergillus, resp viral  panel, and fungitel - pending    Scheduled Meds:   albuterol-ipratropium 2.5mg-0.5mg/3mL  3 mL Nebulization Q4H WAKE    atovaquone  1,500 mg Oral Daily    cycloSPORINE modified  150 mg Oral BID    docusate sodium  100 mg Oral BID    epoetin maurisio (PROCRIT) injection  10,000 Units Intravenous Every Tues, Thurs, Sat    heparin (porcine)  5,000 Units Subcutaneous Q8H    isavuconazonium sulfate  372 mg Oral Q8H    Followed by    [START ON 2/4/2018] isavuconazonium sulfate  372 mg Oral Daily    levETIRAcetam  500 mg Oral BID    levothyroxine  75 mcg Oral Before breakfast    metoclopramide HCl  10 mg Oral QID (AC & HS)    omeprazole  40 mg Oral BID AC    custom IVPB builder  2.25 g Intravenous Q12H    polyethylene glycol  17 g Oral BID    psyllium husk (aspartame)  3.4 g Oral BID    ursodiol  300 mg Oral BID     Continuous Infusions:  PRN Meds:sodium chloride, acetaminophen, bisacodyl, omnipaque, ondansetron, ondansetron, oxyCODONE, prochlorperazine, simethicone, sodium chloride 0.9%    Review of Systems   Constitutional: Positive for activity change, appetite change, chills, fatigue and fever.   HENT: Negative.  Negative for congestion and facial swelling.    Eyes: Negative for pain, discharge and visual disturbance.   Respiratory: Negative for cough, shortness of breath and wheezing.    Cardiovascular: Negative for chest pain and leg swelling.   Gastrointestinal: Positive for abdominal distention, abdominal pain and constipation. Negative for nausea and vomiting.   Genitourinary: Positive for decreased urine volume. Negative for difficulty urinating.        Anuric   Musculoskeletal: Negative for arthralgias, back pain and myalgias.   Skin: Positive for color change and wound. Negative for rash.   Allergic/Immunologic: Positive for immunocompromised state.   Neurological: Negative for dizziness, seizures, weakness and headaches.   Psychiatric/Behavioral: Positive for decreased concentration and dysphoric  mood. Negative for confusion. The patient is not nervous/anxious.    All other systems reviewed and are negative.    Objective:     Vital Signs (Most Recent):  Temp: 99.3 °F (37.4 °C) (02/02/18 1330)  Pulse: 100 (02/02/18 1240)  Resp: (!) 22 (02/02/18 1240)  BP: 134/79 (02/02/18 1240)  SpO2: 98 % (02/02/18 1130) Vital Signs (24h Range):  Temp:  [98.5 °F (36.9 °C)-102.5 °F (39.2 °C)] 99.3 °F (37.4 °C)  Pulse:  [] 100  Resp:  [16-22] 22  SpO2:  [92 %-98 %] 98 %  BP: (122-148)/(70-94) 134/79     Weight: 65.3 kg (143 lb 14.4 oz)  Body mass index is 23.23 kg/m².    Intake/Output - Last 3 Shifts       01/31 0700 - 02/01 0659 02/01 0700 - 02/02 0659 02/02 0700 - 02/03 0659    P.O. 200 365     I.V. (mL/kg)       Blood       Other 600  600    NG/GT 30      IV Piggyback 300 600     Total Intake(mL/kg) 1130 (16.9) 965 (14.8) 600 (9.2)    Urine (mL/kg/hr) 0 (0) 0 (0)     Emesis/NG output 0 (0) 0 (0)     Other 3000 (1.9) 0 (0) 2124 (4.2)    Stool 0 (0) 0 (0)     Blood 0 (0) 0 (0)     Total Output 3000 0 2124    Net -1870 +965 -1524           Urine Occurrence 0 x 0 x     Stool Occurrence 0 x 2 x     Emesis Occurrence 0 x 0 x           Physical Exam   Constitutional: He is oriented to person, place, and time. He appears well-developed. No distress.   Temporal and distal extremity muscle wasting   HENT:   Head: Normocephalic and atraumatic.   Mouth/Throat: No oropharyngeal exudate.   dylan tube   Eyes: EOM are normal. Pupils are equal, round, and reactive to light. Scleral icterus is present.   Neck: Normal range of motion. Neck supple. No JVD present. No thyromegaly present.   Cardiovascular: Normal rate, regular rhythm, normal heart sounds and intact distal pulses.    No murmur heard.  Pulmonary/Chest: Effort normal. No respiratory distress. He has decreased breath sounds in the right middle field, the right lower field and the left lower field. He has no wheezes. He exhibits no tenderness.   Diminished to RLL   Abdominal:  Soft. Bowel sounds are normal. He exhibits ascites. He exhibits no distension. There is tenderness (mild). There is no rebound and no guarding.   Dressing to chevron.  Wd vac removed 1/12/18  Dependent edema present R flank; surrounding erythema   Musculoskeletal: Normal range of motion. He exhibits edema (2+ LE edema). He exhibits no tenderness.   Neurological: He is alert and oriented to person, place, and time. He has normal reflexes.   Skin: Skin is warm and dry. Capillary refill takes 2 to 3 seconds. He is not diaphoretic. No erythema.   jaundice   Psychiatric: He has a normal mood and affect. His behavior is normal. Judgment and thought content normal. His mood appears not anxious.   Depressed mood   Nursing note and vitals reviewed.      Laboratory:  Immunosuppressants         Stop Route Frequency     cycloSPORINE modified capsule 150 mg      -- Oral 2 times daily        CBC:     Recent Labs  Lab 02/02/18  0436   WBC 4.26   RBC 2.25*   HGB 7.0*   HCT 21.4*   *   MCV 95   MCH 31.1*   MCHC 32.7     CMP:     Recent Labs  Lab 02/02/18  0436   GLU 97   CALCIUM 9.1   ALBUMIN 1.8*   PROT 5.4*      K 3.7   CO2 23      BUN 32*   CREATININE 4.6*   ALKPHOS 132   ALT 7*   AST 25   BILITOT 3.3*     Labs within the past 24 hours have been reviewed.    Diagnostic Results:  I have personally reviewed all pertinent imaging studies.    Assessment/Plan:     Fever    - Pt with tmax overnight 102.5   - Started on vanc/cefepime for ERCP. Now transitioned to vanc/zosyn   - Blood cultures sent, prelim NGTD.   - thoracentesis 2/1 - fluid negative for infection  - atelectasis noted on ct scan - start breathing tx & CPT          Severe protein-calorie malnutrition    - Poor PO intake since transplant requiring short course of TPN during previous hospital stay.   - albumin remains decreased but appetite slowly improving per pt.   - Dietary consulted. Will need to closely monitor PO intake.   - supplements also  ordered.   - SHANE with tube feeding start 1/16. Stopped 1/17/18, was not tolerating. Shane removed 1/20.  - prealbumin 7 on 1/15.  - TPN started 1/16/18.   - Pt able to eat minimally 1/21 and 1/22 which is an improvement.  - gi consulted - EGD reviewed, increased ppi bid, miralax bid, and metamucil bid, decreased narcotics  - continue reglan, placed SHANE tube with tube feeds          Constipation    - Enema ordered 1/20 and 1/21 with BM  - Encourage ambulation.  - decrease narcotics  - d/c metamucil and miralax bid as makes patient nauseated  - increase reglan qid            Acute renal failure with tubular necrosis    - HD resumed on previous admission. Now anuric and dialyzes M-W-F.  - Nephrology following.    - HD Sat since held Friday for thora/para.  - HD transitioned to TTS schedule.  - ktm consulted for possible kidney transplant         Delayed surgical wound healing    - wd vac removed 1/12/18.  Wound healing well. Aquacel AG and Mepilex border dressing applied via wound care recs.           Liver transplanted    - Post op course complicated by fevers and hyperbilirubinemia.  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  - AST/ALT normal. Tbili/alkphos remain elevated.  - Liver US 1/8 showed 3.9 cm complex fluid collection anterior to right lobe and moderate nonspecific complex ascites inferior to transplant.   - IR placed drain 1/11/18, cell count negative for infection.  - PBS consulted. PBS not comfortable proceeding with EUS with liver biopsy given fever and possible infectious process going on. ERCP 1/17 with stone and sludge. Pt placed on Actigall - treated with 10 day course of abx.  ID now signed off.  - Bili remains elevated - liver u/s 1/28  - Liver biopsy 1/23 without rejection.  - consulted PBS for ERCP - Choledocholithiasis was found along with sludge, stents placed 1/30          Long-term use of immunosuppressant medication    - Maintenance IS with cyclosporine. MMF on hold  for infections and neutropenia. Continue to check cyclosporine level daily. Assess for toxicity and adjust level as needed.        Prophylactic immunotherapy    - See long term use of immunosuppression.         Recurrent pleural effusion on right    - CXR in ER with large right pleural effusion with subjective c/o worsening SOB  - thoracentesis completed- 1200 ml removed.  SOB with significant improvement.   - Cell count reviewed and negative for infection.   - resp even and non labored.  O2 sat 94-98%.  - Repeat thora 1/19 negative for infection per cell count  - Reaccumulating fluid on xray 1/21.  - thoracentesis 1/31 - 1.4L removed, negative for infection        Biliary stricture of transplanted liver    - tbili rising  - Pt went for ERCP 1/30 with stones and sludge, stents placed  - Will cont to monitor.        At risk for opportunistic infections    - Hold Valcyte as WBC low.  CMV PCR 1/11, 1/18 undetected.  - bactrim held.          Anemia of chronic disease    - Continue daily CBC.  - H&H trending down   - transfused 1/30, continue procrit        Other ascites    - Paracentesis performed 1/11/18 with 1600 ml removed.    - fluid negative for infection.   - Repeat para 1/19 negative for infection per cell count  - no fluid seen for paracentesis on 1/23.        Nausea and vomiting    - h/o constipation, reports having regular BM's with bowel regimen  - vomiting episode x 1 at home. Anti-emetics ordered PRN.  - KUB repeated 1/14/18 given increased abdominal pain- mild, generalized bowel distention suggesting ileus.  Diet changed to clears as tolerated for bowel rest   - SHANE tube placed 1/16 for tube feeds. Not tolerating tube feeds, stopped 1/17/18.  - D/C shane 1/19, now in place as of 1/25.  - chronic constipation, Cont bowel regimen.   - GI consulted. See protein calorie malnutrition              VTE Risk Mitigation         Ordered     heparin (porcine) injection 5,000 Units  Every 8 hours     Route:   Subcutaneous        01/31/18 0957     Medium Risk of VTE  Once      01/11/18 0351     Place sequential compression device  Until discontinued      01/11/18 0351          The patients clinical status was discussed at multidisplinary rounds, involving transplant surgery, transplant medicine, pharmacy, nursing, nutrition, and social work    Discharge Planning: not candidate at this time  Monitor kidney function --> may need outpt HD  Monitor HH needs vs rehab for deconditioned status      Seda Camacho, NP  Liver Transplant  [facility]

## 2018-02-02 NOTE — PLAN OF CARE
Problem: Patient Care Overview  Goal: Plan of Care Review  Outcome: Ongoing (interventions implemented as appropriate)  Patient tolerated hd without difficulty. uf 1500 ml. Catheter flushed with NS and heparin 1000 units/ml instilled to each cath lumen. Catheter clamped, capped and taped. Pt without complaint.

## 2018-02-02 NOTE — PROGRESS NOTES
Pt to HD via wheelchair with escort and mother. Chart and IV pole for TF with patient. In no apparent distress upon departure.

## 2018-02-02 NOTE — SUBJECTIVE & OBJECTIVE
Scheduled Meds:   albuterol-ipratropium 2.5mg-0.5mg/3mL  3 mL Nebulization Q4H WAKE    atovaquone  1,500 mg Oral Daily    cycloSPORINE modified  150 mg Oral BID    docusate sodium  100 mg Oral BID    epoetin maurisio (PROCRIT) injection  10,000 Units Intravenous Every Tues, Thurs, Sat    heparin (porcine)  5,000 Units Subcutaneous Q8H    isavuconazonium sulfate  372 mg Oral Q8H    Followed by    [START ON 2/4/2018] isavuconazonium sulfate  372 mg Oral Daily    levETIRAcetam  500 mg Oral BID    levothyroxine  75 mcg Oral Before breakfast    metoclopramide HCl  10 mg Oral QID (AC & HS)    omeprazole  40 mg Oral BID AC    custom IVPB builder  2.25 g Intravenous Q12H    polyethylene glycol  17 g Oral BID    psyllium husk (aspartame)  3.4 g Oral BID    ursodiol  300 mg Oral BID     Continuous Infusions:  PRN Meds:sodium chloride, acetaminophen, bisacodyl, omnipaque, ondansetron, ondansetron, oxyCODONE, prochlorperazine, simethicone, sodium chloride 0.9%    Review of Systems   Constitutional: Positive for activity change, appetite change, chills, fatigue and fever.   HENT: Negative.  Negative for congestion and facial swelling.    Eyes: Negative for pain, discharge and visual disturbance.   Respiratory: Negative for cough, shortness of breath and wheezing.    Cardiovascular: Negative for chest pain and leg swelling.   Gastrointestinal: Positive for abdominal distention, abdominal pain and constipation. Negative for nausea and vomiting.   Genitourinary: Positive for decreased urine volume. Negative for difficulty urinating.        Anuric   Musculoskeletal: Negative for arthralgias, back pain and myalgias.   Skin: Positive for color change and wound. Negative for rash.   Allergic/Immunologic: Positive for immunocompromised state.   Neurological: Negative for dizziness, seizures, weakness and headaches.   Psychiatric/Behavioral: Positive for decreased concentration and dysphoric mood. Negative for confusion. The  patient is not nervous/anxious.    All other systems reviewed and are negative.    Objective:     Vital Signs (Most Recent):  Temp: 99.3 °F (37.4 °C) (02/02/18 1330)  Pulse: 100 (02/02/18 1240)  Resp: (!) 22 (02/02/18 1240)  BP: 134/79 (02/02/18 1240)  SpO2: 98 % (02/02/18 1130) Vital Signs (24h Range):  Temp:  [98.5 °F (36.9 °C)-102.5 °F (39.2 °C)] 99.3 °F (37.4 °C)  Pulse:  [] 100  Resp:  [16-22] 22  SpO2:  [92 %-98 %] 98 %  BP: (122-148)/(70-94) 134/79     Weight: 65.3 kg (143 lb 14.4 oz)  Body mass index is 23.23 kg/m².    Intake/Output - Last 3 Shifts       01/31 0700 - 02/01 0659 02/01 0700 - 02/02 0659 02/02 0700 - 02/03 0659    P.O. 200 365     I.V. (mL/kg)       Blood       Other 600  600    NG/GT 30      IV Piggyback 300 600     Total Intake(mL/kg) 1130 (16.9) 965 (14.8) 600 (9.2)    Urine (mL/kg/hr) 0 (0) 0 (0)     Emesis/NG output 0 (0) 0 (0)     Other 3000 (1.9) 0 (0) 2124 (4.2)    Stool 0 (0) 0 (0)     Blood 0 (0) 0 (0)     Total Output 3000 0 2124    Net -1870 +965 -1524           Urine Occurrence 0 x 0 x     Stool Occurrence 0 x 2 x     Emesis Occurrence 0 x 0 x           Physical Exam   Constitutional: He is oriented to person, place, and time. He appears well-developed. No distress.   Temporal and distal extremity muscle wasting   HENT:   Head: Normocephalic and atraumatic.   Mouth/Throat: No oropharyngeal exudate.   dylan tube   Eyes: EOM are normal. Pupils are equal, round, and reactive to light. Scleral icterus is present.   Neck: Normal range of motion. Neck supple. No JVD present. No thyromegaly present.   Cardiovascular: Normal rate, regular rhythm, normal heart sounds and intact distal pulses.    No murmur heard.  Pulmonary/Chest: Effort normal. No respiratory distress. He has decreased breath sounds in the right middle field, the right lower field and the left lower field. He has no wheezes. He exhibits no tenderness.   Diminished to RLL   Abdominal: Soft. Bowel sounds are normal. He  exhibits ascites. He exhibits no distension. There is tenderness (mild). There is no rebound and no guarding.   Dressing to chevron.  Wd vac removed 1/12/18  Dependent edema present R flank; surrounding erythema   Musculoskeletal: Normal range of motion. He exhibits edema (2+ LE edema). He exhibits no tenderness.   Neurological: He is alert and oriented to person, place, and time. He has normal reflexes.   Skin: Skin is warm and dry. Capillary refill takes 2 to 3 seconds. He is not diaphoretic. No erythema.   jaundice   Psychiatric: He has a normal mood and affect. His behavior is normal. Judgment and thought content normal. His mood appears not anxious.   Depressed mood   Nursing note and vitals reviewed.      Laboratory:  Immunosuppressants         Stop Route Frequency     cycloSPORINE modified capsule 150 mg      -- Oral 2 times daily        CBC:     Recent Labs  Lab 02/02/18  0436   WBC 4.26   RBC 2.25*   HGB 7.0*   HCT 21.4*   *   MCV 95   MCH 31.1*   MCHC 32.7     CMP:     Recent Labs  Lab 02/02/18  0436   GLU 97   CALCIUM 9.1   ALBUMIN 1.8*   PROT 5.4*      K 3.7   CO2 23      BUN 32*   CREATININE 4.6*   ALKPHOS 132   ALT 7*   AST 25   BILITOT 3.3*     Labs within the past 24 hours have been reviewed.    Diagnostic Results:  I have personally reviewed all pertinent imaging studies.

## 2018-02-02 NOTE — PROGRESS NOTES
SW was approached by pre kidney transplant nurse coordinators this afternoon regarding discussion in kidney transplant selection meeting about patient.   The pre kidney nurse and Dr. Patton are requesting a 2728 be completed on patient with Dr. Patton agreeing to sign the form if was not done at outpatient HD unit.  EZEKIEL lvm for EZEKIEL Lucio ([phone]) at [facility] today to see if a 2728 form had ever been completed on pt.  Dr. Patton now indicating that pt is chronic HD, no longer acute.   If 2728 form not completed at outpatient HD unit, EZEKIEL will request that Lisa in tranpslant clinic complete a form for patient with assistance of Dr. Patton, kidney transplant nephrology.   This form needs to be completed as part of kidney transplant evaluation per the kidney transplant nurse coordinators, Geri Renee, Frances Napier and Cheyenne Vazquez.     SW will continue to follow for all transplant resources, education and psychosocial support.

## 2018-02-02 NOTE — PROGRESS NOTES
Patient arrived via  with transport and mother. Patient weighed standing and was hooked up to unit monitor. 0935  Maintenance Hd initiated per md order to Right IJ permcath without difficulty. Lines secured. Monitoring. Pt. Afebrile. Monitoring.

## 2018-02-02 NOTE — SUBJECTIVE & OBJECTIVE
Interval History: ID called back today due to fever    - ERCP 1/30/18: one stent removed from biliary tree, Two plastic stents were placed into the common bile duct    - Chest/A/P CT 1/28/18: Moderate sized right pleural effusion with near-complete collapse of the right lower lobe, slightly improved from the 1/18/18 exam.   Moderate amount of partially loculated intraperitoneal fluid throughout the abdomen, largest pocket in the right lower quadrant/ pelvis, also overall slightly improved.    - thoracentesis 2/1/18: 130 wbc (34% N), Protein ratio 0.5    Review of Systems   Constitutional: Positive for fatigue. Negative for chills and fever.   HENT: Negative for sore throat.    Respiratory: Negative for cough, chest tightness and shortness of breath.    Cardiovascular: Negative for chest pain, palpitations and leg swelling.   Gastrointestinal: Positive for abdominal distention and abdominal pain. Negative for diarrhea, nausea and vomiting.   Genitourinary: Negative for dysuria, flank pain and urgency.   Skin: Negative for rash.   Neurological: Negative for dizziness, light-headedness and numbness.   Psychiatric/Behavioral: Negative for confusion.     Objective:     Vital Signs (Most Recent):  Temp: 99.3 °F (37.4 °C) (02/02/18 1330)  Pulse: 96 (02/02/18 1514)  Resp: 20 (02/02/18 1514)  BP: 134/79 (02/02/18 1240)  SpO2: 98 % (02/02/18 1514) Vital Signs (24h Range):  Temp:  [98.5 °F (36.9 °C)-101.9 °F (38.8 °C)] 99.3 °F (37.4 °C)  Pulse:  [] 96  Resp:  [16-22] 20  SpO2:  [92 %-98 %] 98 %  BP: (122-134)/(70-81) 134/79     Weight: 65.3 kg (143 lb 14.4 oz)  Body mass index is 23.23 kg/m².    Estimated Creatinine Clearance: 21.6 mL/min (based on SCr of 4.6 mg/dL (H)).    Physical Exam   Constitutional: He is oriented to person, place, and time.   HENT:   Mouth/Throat: No oropharyngeal exudate.   Eyes: No scleral icterus.   Cardiovascular: Regular rhythm.  Tachycardia present.    Pulmonary/Chest: Effort normal and  breath sounds normal. No respiratory distress. He has no wheezes. He has no rales.   Abdominal: Soft. There is no tenderness. There is no rebound.   Surgical incision wrapped   Musculoskeletal: He exhibits edema.   Lymphadenopathy:     He has no cervical adenopathy.   Neurological: He is alert and oriented to person, place, and time. No cranial nerve deficit.   Skin: He is not diaphoretic.       Significant Labs:   Bilirubin:   Recent Labs  Lab 01/05/18  0805  01/12/18  0627  01/29/18  0430 01/30/18  0448 01/31/18  0423 02/01/18  0445 02/02/18  0436   BILIDIR 2.2*  --  2.0*  --   --   --   --   --   --    BILITOT 3.2*  < > 2.7*  < > 4.4* 4.2* 5.1* 4.0* 3.3*   < > = values in this interval not displayed.  Blood Culture:     Recent Labs  Lab 01/17/18  1651 01/18/18  1922 01/18/18  1927 01/28/18  1122 01/30/18  1916   LABBLOO No growth after 5 days. No growth after 5 days. No growth after 5 days. No growth after 5 days. No Growth to date  No Growth to date  No Growth to date  No Growth to date  No Growth to date  No Growth to date     BMP:     Recent Labs  Lab 02/02/18  0436   GLU 97      K 3.7      CO2 23   BUN 32*   CREATININE 4.6*   CALCIUM 9.1   MG 1.5*     CBC:     Recent Labs  Lab 02/01/18 0445 02/02/18 0436   WBC 3.46* 4.26   HGB 7.2* 7.0*   HCT 21.4* 21.4*   * 110*     CMP:     Recent Labs  Lab 02/01/18 0445 02/02/18  0436    136   K 3.4* 3.7    100   CO2 28 23    97   BUN 20 32*   CREATININE 3.4* 4.6*   CALCIUM 9.1 9.1   PROT 5.7* 5.4*   ALBUMIN 2.0* 1.8*   BILITOT 4.0* 3.3*   ALKPHOS 158* 132   AST 29 25   ALT 6* 7*   ANIONGAP 9 13   EGFRNONAA 23.2* 16.1*     Microbiology Results (last 7 days)     Procedure Component Value Units Date/Time    Blood culture [[id number]] Collected:  01/28/18 1122    Order Status:  Completed Specimen:  Blood Updated:  02/02/18 1412     Blood Culture, Routine No growth after 5 days.    Culture, Anaerobic [[id number]] Collected:  01/31/18  1344    Order Status:  Completed Specimen:  Body Fluid from Pleural Fluid Updated:  02/02/18 0908     Anaerobic Culture Culture in progress    Blood culture [[id number]] Collected:  01/30/18 1916    Order Status:  Completed Specimen:  Blood Updated:  02/01/18 2212     Blood Culture, Routine No Growth to date     Blood Culture, Routine No Growth to date     Blood Culture, Routine No Growth to date    Blood culture [[id number]] Collected:  01/30/18 1916    Order Status:  Completed Specimen:  Blood Updated:  02/01/18 2212     Blood Culture, Routine No Growth to date     Blood Culture, Routine No Growth to date     Blood Culture, Routine No Growth to date    Respiratory Viral Panel by PCR [facility]; Nasal Swab [[id number]] Collected:  02/01/18 1552    Order Status:  Sent Specimen:  Respiratory Updated:  02/01/18 1553    Culture, Body Fluid (Aerobic) w/ GS [[id number]] Collected:  01/31/18 1344    Order Status:  Completed Specimen:  Body Fluid from Pleural Fluid Updated:  01/31/18 2156     Gram Stain Result Rare WBC's      No organisms seen    Fungus culture [[id number]] Collected:  01/31/18 1344    Order Status:  Sent Specimen:  Body Fluid from Pleural Fluid Updated:  01/31/18 1540    Blood culture [[id number]]     Order Status:  Canceled Specimen:  Blood     Blood culture [[id number]]     Order Status:  Canceled Specimen:  Blood     Blood culture [[id number]]     Order Status:  Canceled Specimen:  Blood     Urine culture [[id number]]     Order Status:  Canceled Specimen:  Urine           Significant Imaging: I have reviewed all pertinent imaging results/findings within the past 24 hours.

## 2018-02-03 LAB
ALBUMIN SERPL BCP-MCNC: 1.9 G/DL
ALP SERPL-CCNC: 129 U/L
ALT SERPL W/O P-5'-P-CCNC: 6 U/L
ANION GAP SERPL CALC-SCNC: 11 MMOL/L
AST SERPL-CCNC: 19 U/L
BASOPHILS # BLD AUTO: 0.01 K/UL
BASOPHILS NFR BLD: 0.3 %
BILIRUB SERPL-MCNC: 2.8 MG/DL
BLD PROD TYP BPU: NORMAL
BLOOD UNIT EXPIRATION DATE: NORMAL
BLOOD UNIT TYPE CODE: 5100
BLOOD UNIT TYPE: NORMAL
BUN SERPL-MCNC: 28 MG/DL
CALCIUM SERPL-MCNC: 9.1 MG/DL
CHLORIDE SERPL-SCNC: 100 MMOL/L
CO2 SERPL-SCNC: 24 MMOL/L
CODING SYSTEM: NORMAL
CREAT SERPL-MCNC: 3.7 MG/DL
CYCLOSPORINE BLD LC/MS/MS-MCNC: 156 NG/ML
DIFFERENTIAL METHOD: ABNORMAL
DISPENSE STATUS: NORMAL
EOSINOPHIL # BLD AUTO: 0.2 K/UL
EOSINOPHIL NFR BLD: 6.1 %
ERYTHROCYTE [DISTWIDTH] IN BLOOD BY AUTOMATED COUNT: 16.2 %
EST. GFR  (AFRICAN AMERICAN): 24.2 ML/MIN/1.73 M^2
EST. GFR  (NON AFRICAN AMERICAN): 21 ML/MIN/1.73 M^2
GLUCOSE SERPL-MCNC: 83 MG/DL
HCT VFR BLD AUTO: 20.2 %
HGB BLD-MCNC: 6.6 G/DL
IMM GRANULOCYTES # BLD AUTO: 0.08 K/UL
IMM GRANULOCYTES NFR BLD AUTO: 2.4 %
LYMPHOCYTES # BLD AUTO: 0.8 K/UL
LYMPHOCYTES NFR BLD: 23.2 %
MAGNESIUM SERPL-MCNC: 1.9 MG/DL
MCH RBC QN AUTO: 31.7 PG
MCHC RBC AUTO-ENTMCNC: 32.7 G/DL
MCV RBC AUTO: 97 FL
MONOCYTES # BLD AUTO: 0.4 K/UL
MONOCYTES NFR BLD: 12.5 %
NEUTROPHILS # BLD AUTO: 1.8 K/UL
NEUTROPHILS NFR BLD: 55.5 %
NRBC BLD-RTO: 0 /100 WBC
PHOSPHATE SERPL-MCNC: 2.7 MG/DL
PLATELET # BLD AUTO: 120 K/UL
PMV BLD AUTO: 11.1 FL
POTASSIUM SERPL-SCNC: 3.9 MMOL/L
PROT SERPL-MCNC: 5.8 G/DL
RBC # BLD AUTO: 2.08 M/UL
SODIUM SERPL-SCNC: 135 MMOL/L
TRANS ERYTHROCYTES VOL PATIENT: NORMAL ML
VANCOMYCIN SERPL-MCNC: 12.9 UG/ML
WBC # BLD AUTO: 3.28 K/UL

## 2018-02-03 PROCEDURE — 99233 SBSQ HOSP IP/OBS HIGH 50: CPT | Mod: ,,, | Performed by: INTERNAL MEDICINE

## 2018-02-03 PROCEDURE — 99233 SBSQ HOSP IP/OBS HIGH 50: CPT | Mod: ,,, | Performed by: NURSE PRACTITIONER

## 2018-02-03 PROCEDURE — 80202 ASSAY OF VANCOMYCIN: CPT

## 2018-02-03 PROCEDURE — 63600175 PHARM REV CODE 636 W HCPCS: Mod: JG | Performed by: SURGERY

## 2018-02-03 PROCEDURE — 63600175 PHARM REV CODE 636 W HCPCS: Performed by: PHYSICIAN ASSISTANT

## 2018-02-03 PROCEDURE — 25000003 PHARM REV CODE 250: Performed by: PHYSICIAN ASSISTANT

## 2018-02-03 PROCEDURE — 25000242 PHARM REV CODE 250 ALT 637 W/ HCPCS: Performed by: NURSE PRACTITIONER

## 2018-02-03 PROCEDURE — 25000003 PHARM REV CODE 250: Performed by: NURSE PRACTITIONER

## 2018-02-03 PROCEDURE — 85025 COMPLETE CBC W/AUTO DIFF WBC: CPT

## 2018-02-03 PROCEDURE — 20600001 HC STEP DOWN PRIVATE ROOM

## 2018-02-03 PROCEDURE — P9021 RED BLOOD CELLS UNIT: HCPCS

## 2018-02-03 PROCEDURE — 84100 ASSAY OF PHOSPHORUS: CPT

## 2018-02-03 PROCEDURE — 25000003 PHARM REV CODE 250: Performed by: SURGERY

## 2018-02-03 PROCEDURE — 83735 ASSAY OF MAGNESIUM: CPT

## 2018-02-03 PROCEDURE — 94664 DEMO&/EVAL PT USE INHALER: CPT

## 2018-02-03 PROCEDURE — 63600175 PHARM REV CODE 636 W HCPCS: Performed by: NURSE PRACTITIONER

## 2018-02-03 PROCEDURE — 80053 COMPREHEN METABOLIC PANEL: CPT

## 2018-02-03 PROCEDURE — 94640 AIRWAY INHALATION TREATMENT: CPT

## 2018-02-03 PROCEDURE — 80158 DRUG ASSAY CYCLOSPORINE: CPT

## 2018-02-03 PROCEDURE — 94761 N-INVAS EAR/PLS OXIMETRY MLT: CPT

## 2018-02-03 PROCEDURE — 99900035 HC TECH TIME PER 15 MIN (STAT)

## 2018-02-03 PROCEDURE — 36430 TRANSFUSION BLD/BLD COMPNT: CPT

## 2018-02-03 RX ORDER — HYDROCODONE BITARTRATE AND ACETAMINOPHEN 500; 5 MG/1; MG/1
TABLET ORAL
Status: DISCONTINUED | OUTPATIENT
Start: 2018-02-03 | End: 2018-02-10

## 2018-02-03 RX ORDER — CYCLOSPORINE 100 MG/ML
175 SOLUTION ORAL 2 TIMES DAILY
Status: DISCONTINUED | OUTPATIENT
Start: 2018-02-03 | End: 2018-02-18

## 2018-02-03 RX ADMIN — IPRATROPIUM BROMIDE AND ALBUTEROL SULFATE 3 ML: .5; 3 SOLUTION RESPIRATORY (INHALATION) at 11:02

## 2018-02-03 RX ADMIN — LEVETIRACETAM 500 MG: 100 SOLUTION ORAL at 08:02

## 2018-02-03 RX ADMIN — OMEPRAZOLE 40 MG: 40 CAPSULE, DELAYED RELEASE ORAL at 05:02

## 2018-02-03 RX ADMIN — METOCLOPRAMIDE HYDROCHLORIDE 10 MG: 5 SOLUTION ORAL at 04:02

## 2018-02-03 RX ADMIN — PIPERACILLIN AND TAZOBACTAM 2.25 G: 4; .5 INJECTION, POWDER, LYOPHILIZED, FOR SOLUTION INTRAVENOUS; PARENTERAL at 09:02

## 2018-02-03 RX ADMIN — URSODIOL 300 MG: 300 CAPSULE ORAL at 05:02

## 2018-02-03 RX ADMIN — LEVETIRACETAM 500 MG: 100 SOLUTION ORAL at 09:02

## 2018-02-03 RX ADMIN — METOCLOPRAMIDE HYDROCHLORIDE 10 MG: 5 SOLUTION ORAL at 09:02

## 2018-02-03 RX ADMIN — LEVOTHYROXINE SODIUM 75 MCG: 75 TABLET ORAL at 05:02

## 2018-02-03 RX ADMIN — METOCLOPRAMIDE HYDROCHLORIDE 10 MG: 5 SOLUTION ORAL at 11:02

## 2018-02-03 RX ADMIN — ISAVUCONAZONIUM SULFATE: 74.4 INJECTION, POWDER, LYOPHILIZED, FOR SOLUTION INTRAVENOUS at 06:02

## 2018-02-03 RX ADMIN — ISAVUCONAZONIUM SULFATE: 74.4 INJECTION, POWDER, LYOPHILIZED, FOR SOLUTION INTRAVENOUS at 03:02

## 2018-02-03 RX ADMIN — ONDANSETRON HYDROCHLORIDE 4 MG: 2 INJECTION, SOLUTION INTRAMUSCULAR; INTRAVENOUS at 06:02

## 2018-02-03 RX ADMIN — ATOVAQUONE 1500 MG: 750 SUSPENSION ORAL at 08:02

## 2018-02-03 RX ADMIN — OXYCODONE HYDROCHLORIDE 5 MG: 5 TABLET ORAL at 02:02

## 2018-02-03 RX ADMIN — METOCLOPRAMIDE HYDROCHLORIDE 10 MG: 5 SOLUTION ORAL at 05:02

## 2018-02-03 RX ADMIN — URSODIOL 300 MG: 300 CAPSULE ORAL at 09:02

## 2018-02-03 RX ADMIN — OXYCODONE HYDROCHLORIDE 5 MG: 5 TABLET ORAL at 10:02

## 2018-02-03 RX ADMIN — IPRATROPIUM BROMIDE AND ALBUTEROL SULFATE 3 ML: .5; 3 SOLUTION RESPIRATORY (INHALATION) at 07:02

## 2018-02-03 RX ADMIN — ISAVUCONAZONIUM SULFATE: 74.4 INJECTION, POWDER, LYOPHILIZED, FOR SOLUTION INTRAVENOUS at 09:02

## 2018-02-03 RX ADMIN — OXYCODONE HYDROCHLORIDE 5 MG: 5 TABLET ORAL at 12:02

## 2018-02-03 RX ADMIN — CYCLOSPORINE 175 MG: 100 SOLUTION ORAL at 06:02

## 2018-02-03 RX ADMIN — OXYCODONE HYDROCHLORIDE 5 MG: 5 TABLET ORAL at 06:02

## 2018-02-03 RX ADMIN — HEPARIN SODIUM 5000 UNITS: 5000 INJECTION, SOLUTION INTRAVENOUS; SUBCUTANEOUS at 02:02

## 2018-02-03 RX ADMIN — OMEPRAZOLE 40 MG: 40 CAPSULE, DELAYED RELEASE ORAL at 03:02

## 2018-02-03 RX ADMIN — OXYCODONE HYDROCHLORIDE 5 MG: 5 TABLET ORAL at 04:02

## 2018-02-03 RX ADMIN — CYCLOSPORINE 150 MG: 100 SOLUTION ORAL at 08:02

## 2018-02-03 RX ADMIN — URSODIOL 300 MG: 300 CAPSULE ORAL at 02:02

## 2018-02-03 RX ADMIN — IPRATROPIUM BROMIDE AND ALBUTEROL SULFATE 3 ML: .5; 3 SOLUTION RESPIRATORY (INHALATION) at 08:02

## 2018-02-03 RX ADMIN — HEPARIN SODIUM 5000 UNITS: 5000 INJECTION, SOLUTION INTRAVENOUS; SUBCUTANEOUS at 09:02

## 2018-02-03 RX ADMIN — ERYTHROPOIETIN 10000 UNITS: 10000 INJECTION, SOLUTION INTRAVENOUS; SUBCUTANEOUS at 11:02

## 2018-02-03 NOTE — PLAN OF CARE
Problem: Patient Care Overview  Goal: Plan of Care Review  Outcome: Ongoing (interventions implemented as appropriate)  Pt aao x 4. Pt currently in bed with bed in lowest/locked position. Call light in reach. Free from falls/injury this shift and wearing non-skid footwear when ambulating. Pt encouraged to call for assistance when needed. Mother and sister at bedside. Pt has received 1 unit RBCs given. TF @ 45 cc/hr; pt tolerating feedings without c/o n/v. Oxy 5 mg given for c/o pain q 4hrs.     Will continue to monitor, assess, and adjust care as needed.

## 2018-02-03 NOTE — PROGRESS NOTES
[facility]  Infectious Disease  Progress Note    Patient Name: Jhonny Diana  MRN: [id number]  Admission Date: 1/11/2018  Length of Stay: 23 days  Attending Physician: Nathanael Medina MD  Primary Care Provider: Primary Doctor No    Isolation Status: No active isolations  Assessment/Plan:      Fever    28 y/o male with a history of alcoholic hepatitis  - s/p liver transplant  10/19/17  - CMV D+/R+  - induction: steroids  - maintenance: cyclosporine  - OI prophylaxis: isavuconazole  - complications: seizures, LAURA, superificial wound infection, several admissions with culture negative peritonitis due to suspected indolent biliary leakage   - ID following for fever  - ERCP 1/30/18: one stent removed from biliary tree, Two plastic stents were placed into the common bile duct  - Chest/A/P CT 1/28/18: Moderate sized right pleural effusion, moderate amount of partially loculated intraperitoneal fluid throughout the abdomen, largest pocket in the right lower quadrant/ pelvis  - thoracentesis 2/1/18: 130 wbc (34% N), Protein ratio 0.5, culture NGTD  - B/C 2/1/18 NGTD  - CT sinus 2/1/18: negative   - CT chest A/P 2/1/18: Increased patchy and confluent consolidative change with air bronchograms involving the right middle lobe and left lower lobe   - suspect cause of fever is from intraabdominal source (IA fluid collections)   - fever curve improving on empiric zosyn    Recommendations:  - would transition to Augmentin to complete a 14 days course for presumed cholangitis (last day 2/14/18)                  Anticipated Disposition: Augmentin until 2/14/18    Thank you for your consult. I will sign off. Please contact us if you have any additional questions.    Rk Che MD  Infectious Disease Fellow, PGY-5  Spectra: [phone]  Pager: [phone]  [facility]    Subjective:     Principal Problem:Fever    HPI: No notes on file  Interval History: fever curve trending down; otherwise no  change    Review of Systems   Constitutional: Positive for fatigue. Negative for chills and fever.   HENT: Negative for sore throat.    Respiratory: Negative for cough, chest tightness and shortness of breath.    Cardiovascular: Negative for chest pain, palpitations and leg swelling.   Gastrointestinal: Positive for abdominal distention and abdominal pain. Negative for diarrhea, nausea and vomiting.   Genitourinary: Negative for dysuria, flank pain and urgency.   Skin: Negative for rash.   Neurological: Negative for dizziness, light-headedness and numbness.   Psychiatric/Behavioral: Negative for confusion.     Objective:     Vital Signs (Most Recent):  Temp: 99 °F (37.2 °C) (02/03/18 1145)  Pulse: 91 (02/03/18 1154)  Resp: 18 (02/03/18 1154)  BP: (!) 106/58 (02/03/18 1145)  SpO2: 96 % (02/03/18 1154) Vital Signs (24h Range):  Temp:  [98.6 °F (37 °C)-100 °F (37.8 °C)] 99 °F (37.2 °C)  Pulse:  [] 91  Resp:  [15-20] 18  SpO2:  [94 %-100 %] 96 %  BP: (106-123)/(58-71) 106/58     Weight: 65.3 kg (143 lb 14.4 oz)  Body mass index is 23.23 kg/m².    Estimated Creatinine Clearance: 26.8 mL/min (based on SCr of 3.7 mg/dL (H)).    Physical Exam   Constitutional: He is oriented to person, place, and time.   HENT:   Mouth/Throat: No oropharyngeal exudate.   Eyes: No scleral icterus.   Cardiovascular: Normal rate and regular rhythm.    Pulmonary/Chest: Effort normal and breath sounds normal. No respiratory distress. He has no wheezes. He has no rales.   Abdominal: Soft. There is no tenderness. There is no rebound.   Surgical incision wrapped   Musculoskeletal: He exhibits edema.   Lymphadenopathy:     He has no cervical adenopathy.   Neurological: He is alert and oriented to person, place, and time. No cranial nerve deficit.   Skin: He is not diaphoretic.       Significant Labs:   Bilirubin:   Recent Labs  Lab 01/05/18  0805  01/12/18  0627  01/30/18  0448 01/31/18  0423 02/01/18  0445 02/02/18  0436 02/03/18  0530    BILIDIR 2.2*  --  2.0*  --   --   --   --   --   --    BILITOT 3.2*  < > 2.7*  < > 4.2* 5.1* 4.0* 3.3* 2.8*   < > = values in this interval not displayed.  Blood Culture:     Recent Labs  Lab 01/17/18  1651 01/18/18  1922 01/18/18  1927 01/28/18  1122 01/30/18 1916   LABBLOO No growth after 5 days. No growth after 5 days. No growth after 5 days. No growth after 5 days. No Growth to date  No Growth to date  No Growth to date  No Growth to date  No Growth to date  No Growth to date  No Growth to date  No Growth to date     BMP:     Recent Labs  Lab 02/03/18  0530   GLU 83   *   K 3.9      CO2 24   BUN 28*   CREATININE 3.7*   CALCIUM 9.1   MG 1.9     CBC:     Recent Labs  Lab 02/02/18  0436 02/03/18  0530   WBC 4.26 3.28*   HGB 7.0* 6.6*   HCT 21.4* 20.2*   * 120*     CMP:     Recent Labs  Lab 02/02/18  0436 02/03/18  0530    135*   K 3.7 3.9    100   CO2 23 24   GLU 97 83   BUN 32* 28*   CREATININE 4.6* 3.7*   CALCIUM 9.1 9.1   PROT 5.4* 5.8*   ALBUMIN 1.8* 1.9*   BILITOT 3.3* 2.8*   ALKPHOS 132 129   AST 25 19   ALT 7* 6*   ANIONGAP 13 11   EGFRNONAA 16.1* 21.0*     Microbiology Results (last 7 days)     Procedure Component Value Units Date/Time    Blood culture [[id number]] Collected:  01/30/18 1916    Order Status:  Completed Specimen:  Blood Updated:  02/02/18 2212     Blood Culture, Routine No Growth to date     Blood Culture, Routine No Growth to date     Blood Culture, Routine No Growth to date     Blood Culture, Routine No Growth to date    Blood culture [[id number]] Collected:  01/30/18 1916    Order Status:  Completed Specimen:  Blood Updated:  02/02/18 2212     Blood Culture, Routine No Growth to date     Blood Culture, Routine No Growth to date     Blood Culture, Routine No Growth to date     Blood Culture, Routine No Growth to date    Blood culture [[id number]] Collected:  01/28/18 1122    Order Status:  Completed Specimen:  Blood Updated:  02/02/18 1412     Blood  Culture, Routine No growth after 5 days.    Culture, Anaerobic [[id number]] Collected:  01/31/18 1344    Order Status:  Completed Specimen:  Body Fluid from Pleural Fluid Updated:  02/02/18 0908     Anaerobic Culture Culture in progress    Respiratory Viral Panel by PCR [facility]; Nasal Swab [[id number]] Collected:  02/01/18 1552    Order Status:  Sent Specimen:  Respiratory Updated:  02/01/18 1553    Culture, Body Fluid (Aerobic) w/ GS [[id number]] Collected:  01/31/18 1344    Order Status:  Completed Specimen:  Body Fluid from Pleural Fluid Updated:  01/31/18 2156     Gram Stain Result Rare WBC's      No organisms seen    Fungus culture [[id number]] Collected:  01/31/18 1344    Order Status:  Sent Specimen:  Body Fluid from Pleural Fluid Updated:  01/31/18 1540    Blood culture [[id number]]     Order Status:  Canceled Specimen:  Blood     Blood culture [[id number]]     Order Status:  Canceled Specimen:  Blood     Blood culture [[id number]]     Order Status:  Canceled Specimen:  Blood     Urine culture [[id number]]     Order Status:  Canceled Specimen:  Urine           Significant Imaging: I have reviewed all pertinent imaging results/findings within the past 24 hours.

## 2018-02-03 NOTE — PLAN OF CARE
Problem: Patient Care Overview  Goal: Plan of Care Review  Outcome: Ongoing (interventions implemented as appropriate)  Pt is AAOx3.Pt is ambulatory and independent.Pt able to perform all ADL's without assistance. Pt is in good spirits.  Pt denies pian and/or discomfort at this time.Standard precautions maintained.  Pt remains injury and fall free, non skid footwear donned, call light within reach, personal items within reach, bed in low/locked position, pt able to voice needs all needs voiced have been met at this time.

## 2018-02-03 NOTE — PROGRESS NOTES
[facility]  Liver Transplant  Progress Note    Patient Name: Jhonny Diana  MRN: [id number]  Admission Date: 2018  Hospital Length of Stay: 23 days  Code Status: Full Code  Primary Care Provider: Primary Doctor No  Post-Operative Day: 107    ORGAN:   LIVER  Disease Etiology: Acute Alcoholic Hepatitis  Donor Type:    - Brain Death  CDC High Risk:   No  Donor CMV Status:   Donor CMV Status: Positive  Donor HBcAB:   Negative  Donor HCV Status:   Negative  Whole or Partial: Whole Liver  Biliary Anastomosis: End to End  Arterial Anatomy: Standard  Subjective:     History of Present Illness:  Jhonny Diana is a 26 y/o male with past medical history of alcoholic cirrhosis.  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 Found to have peritonitis in November (WBC 5000, 75% PNM) neg for bile leak. He was treated initially with vanc/cefepime. Repeat cell counts  with some improvement (WBC 1400, 45% PNM). He has undergone multiple paracenteses as well as abscess drainage of perihepatic fluid collections and treated with antimicrobial therapy but no positive cultures. Of note, biliary stricture also identified and ERCP performed on 2017 with sphincterotomy and biliary stent placed. Liver tests still have not normalized despite intervention, bilirubin and AP remain elevated. Other pertinent PMH current wound vac in place 2/2 wound infection, malnutrition requiring TPN for short course and ongoing hypoalbuminemia, and seizure activity while on prograf and has since been switched to cyclosporine without reoccurrence.  He presented to the ER for fever, abdominal pain, and N/V. He reports fever (103) for 1  day prior. Overnight, he developed N/V, reports small amount of green emesis with new left sided pain. He also endorses worsening SOB with exertion. He was scheduled as an outpatient for follow up paracentesis and IR drainage of fluid collection. CXR in ER shows large pleural effusion with subsegmental atelectasis. Infectious work up initiated in ER. His ANC is 900. Broad spectrum antibiotics initiated in ED. He denies chest pain, palpitations, diarrhea, constipation, or back pain. Denies any sick contacts.    Hospital Course:  Thoracentesis (1.2L off), Paracentesis (1.6L off), and IR drainage of fluid collection 1/11, all fluids negative for infection. ID consulted. Broad spectrum antibiotics d/c'd 1/15. Chronically malnourished with poor PO intake, prealbumin 7. Shane tube placed 1/15 for tube feedings.     ERCP 1/17 with sludge and a biliary stone which was removed and stent exchanged.  Remained with n/v; therefore, TF remained on hold and TPN continued.   Pt with fever s/p ERCP on 1/17 which continued until 1/19.  Vanc/cefepime restarted.  Blood cx 1/17 and 1/18 NGTD.  ID reconsulted.  Fungal markers sent.  CT C/A/P obtained.  With large R pleural effusion and ascites- both drained 1/19 and negative for infx per cell count. Pt afebrile 1/20. Liver biopsy 1/23 - without rejection.  EGD 1/25 - unremarkable for source of GI symptoms.  Resume diet along with other GI recommendations. SHANE tube placed for tube feeds.       Interval History:  No acute events overnight. H&H low this am and will plan to transfuse one unit of PRBCs today. Remains afebrile. Cont with antibxs for now. ID consulted - appreciate recs. ERCP 1/30 revealed stones and sludge. Stents placed. Tbili slowly improving, will watch trend. Pt still with generalized abdominal pain although not worsening. Thoracentesis 1/31 with 1.4L removed - fluid negative for infection.  Ct scan reviewed.  Sent aspergillus, resp viral panel, and fungitel -  pending    Scheduled Meds:   albuterol-ipratropium 2.5mg-0.5mg/3mL  3 mL Nebulization Q4H WAKE    atovaquone  1,500 mg Oral Daily    cycloSPORINE  175 mg Oral BID    docusate sodium  100 mg Oral BID    epoetin maurisio (PROCRIT) injection  10,000 Units Intravenous Every Tues, Thurs, Sat    heparin (porcine)  5,000 Units Subcutaneous Q8H    custom IVPB builder   Intravenous Q8H    Followed by    [START ON 2/4/2018] custom IVPB builder   Intravenous Q24H    levetiracetam oral soln  500 mg Oral BID    levothyroxine  75 mcg Oral Before breakfast    metoclopramide HCl  10 mg Oral QID (AC & HS)    omeprazole  40 mg Oral BID AC    custom IVPB builder  2.25 g Intravenous Q12H    polyethylene glycol  17 g Oral BID    psyllium husk (aspartame)  3.4 g Oral BID    ursodiol  300 mg Oral TID     Continuous Infusions:  PRN Meds:sodium chloride, sodium chloride, acetaminophen, bisacodyl, omnipaque, ondansetron, ondansetron, oxyCODONE, prochlorperazine, simethicone, sodium chloride 0.9%    Review of Systems   Constitutional: Positive for activity change, appetite change, chills, fatigue and fever.   HENT: Negative.  Negative for congestion and facial swelling.    Eyes: Negative for pain, discharge and visual disturbance.   Respiratory: Negative for cough, shortness of breath and wheezing.    Cardiovascular: Negative for chest pain and leg swelling.   Gastrointestinal: Positive for abdominal distention, abdominal pain and constipation. Negative for nausea and vomiting.   Genitourinary: Positive for decreased urine volume. Negative for difficulty urinating.        Anuric   Musculoskeletal: Negative for arthralgias, back pain and myalgias.   Skin: Positive for color change and wound. Negative for rash.   Allergic/Immunologic: Positive for immunocompromised state.   Neurological: Negative for dizziness, seizures, weakness and headaches.   Psychiatric/Behavioral: Positive for decreased concentration and dysphoric mood.  Negative for confusion. The patient is not nervous/anxious.    All other systems reviewed and are negative.    Objective:     Vital Signs (Most Recent):  Temp: 99.5 °F (37.5 °C) (02/03/18 1115)  Pulse: 99 (02/03/18 1115)  Resp: 18 (02/03/18 1115)  BP: (!) 106/59 (02/03/18 1115)  SpO2: 95 % (02/03/18 1115) Vital Signs (24h Range):  Temp:  [98.6 °F (37 °C)-100 °F (37.8 °C)] 99.5 °F (37.5 °C)  Pulse:  [] 99  Resp:  [15-22] 18  SpO2:  [94 %-100 %] 95 %  BP: (106-134)/(59-81) 106/59     Weight: 65.3 kg (143 lb 14.4 oz)  Body mass index is 23.23 kg/m².    Intake/Output - Last 3 Shifts       02/01 0700 - 02/02 0659 02/02 0700 - 02/03 0659 02/03 0700 - 02/04 0659    P.O. 365 480     Other  600     NG/GT  270     IV Piggyback 600 100     Total Intake(mL/kg) 965 (14.8) 1450 (22.2)     Urine (mL/kg/hr) 0 (0) 0 (0)     Emesis/NG output 0 (0)      Other 0 (0) 2124 (1.4)     Stool 0 (0)      Blood 0 (0)      Total Output 0 2124      Net +965 -674             Urine Occurrence 0 x      Stool Occurrence 2 x  2 x    Emesis Occurrence 0 x            Physical Exam   Constitutional: He is oriented to person, place, and time. He appears well-developed. No distress.   Temporal and distal extremity muscle wasting   HENT:   Head: Normocephalic and atraumatic.   Mouth/Throat: No oropharyngeal exudate.   dylan tube   Eyes: EOM are normal. Pupils are equal, round, and reactive to light. Scleral icterus is present.   Neck: Normal range of motion. Neck supple. No JVD present. No thyromegaly present.   Cardiovascular: Normal rate, regular rhythm, normal heart sounds and intact distal pulses.    No murmur heard.  Pulmonary/Chest: Effort normal. No respiratory distress. He has decreased breath sounds in the right middle field, the right lower field and the left lower field. He has no wheezes. He exhibits no tenderness.   Diminished to RLL   Abdominal: Soft. Bowel sounds are normal. He exhibits ascites. He exhibits no distension. There is  tenderness (mild). There is no rebound and no guarding.   Dressing to chevron.  Wd vac removed 1/12/18  Dependent edema present R flank; surrounding erythema   Musculoskeletal: Normal range of motion. He exhibits edema (2+ LE edema). He exhibits no tenderness.   Neurological: He is alert and oriented to person, place, and time. He has normal reflexes.   Skin: Skin is warm and dry. Capillary refill takes 2 to 3 seconds. He is not diaphoretic. No erythema.   jaundice   Psychiatric: He has a normal mood and affect. His behavior is normal. Judgment and thought content normal. His mood appears not anxious.   Depressed mood   Nursing note and vitals reviewed.      Laboratory:  Immunosuppressants         Stop Route Frequency     cycloSPORINE (NEORAL) 100 mg/mL microemulsion solution 175 mg      -- Oral 2 times daily        CBC:   Recent Labs  Lab 02/03/18  0530   WBC 3.28*   RBC 2.08*   HGB 6.6*   HCT 20.2*   *   MCV 97   MCH 31.7*   MCHC 32.7     CMP:   Recent Labs  Lab 02/03/18  0530   GLU 83   CALCIUM 9.1   ALBUMIN 1.9*   PROT 5.8*   *   K 3.9   CO2 24      BUN 28*   CREATININE 3.7*   ALKPHOS 129   ALT 6*   AST 19   BILITOT 2.8*     Coagulation: No results for input(s): PT, INR, APTT in the last 168 hours.  Labs within the past 24 hours have been reviewed.    Diagnostic Results:  None    Assessment/Plan:     Biliary stricture of transplanted liver    - tbili with increase prompting ERCP 1/30 with stones and sludge, stents placed  - T bili now improving. Trend daily.         Liver transplanted    - Post op course complicated by fevers and hyperbilirubinemia.  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  - AST/ALT normal. Tbili/alkphos remain elevated.  - Liver US 1/8 showed 3.9 cm complex fluid collection anterior to right lobe and moderate nonspecific complex ascites inferior to transplant.   - IR placed drain 1/11/18, cell count negative for infection.  - PBS consulted. PBS not  comfortable proceeding with EUS with liver biopsy given fever and possible infectious process going on. ERCP 1/17 with stone and sludge. Pt placed on Actigall - treated with 10 day course of abx.  ID now signed off.  - Bili elevated - liver u/s 1/28  - Liver biopsy 1/23 without rejection.  - consulted PBS for ERCP - Choledocholithiasis was found along with sludge, stents placed 1/30  - T bili now improving.           Prophylactic immunotherapy    - See long term use of immunosuppression.         Long-term use of immunosuppressant medication    - Maintenance IS with cyclosporine. MMF on hold for infections and neutropenia. Continue to check cyclosporine level daily. Assess for toxicity and adjust level as needed.        At risk for opportunistic infections    - Hold Valcyte as WBC low.  CMV PCR 1/11, 1/18 undetected.  - bactrim held.          Fever    - Pt afebrile now overnight.   - Started on vanc/cefepime for ERCP. Now transitioned to vanc/zosyn   - Blood cultures sent, prelim NGTD.   - thoracentesis 2/1 - fluid negative for infection  - atelectasis noted on ct scan - start breathing tx & CPT          Constipation    - Enema ordered 1/20 and 1/21 with BM  - Encourage ambulation.  - decrease narcotics  - d/c metamucil and miralax bid as makes patient nauseated  - increase reglan qid            Other ascites    - Paracentesis performed 1/11/18 with 1600 ml removed.    - fluid negative for infection.   - Repeat para 1/19 negative for infection per cell count  - no fluid seen for paracentesis on 1/23.        Severe protein-calorie malnutrition    - Poor PO intake since transplant requiring short course of TPN during previous hospital stay.   - albumin remains decreased but appetite slowly improving per pt.   - Dietary consulted. Will need to closely monitor PO intake.   - supplements also ordered.   - SHANE with tube feeding start 1/16. Stopped 1/17/18, was not tolerating. Shane removed 1/20.  - prealbumin 7 on 1/15.  -  TPN started 1/16/18.   - Pt able to eat minimally 1/21 and 1/22 which is an improvement.  - gi consulted - EGD reviewed, increased ppi bid, miralax bid, and metamucil bid, decreased narcotics  - continue reglan, placed SHANE tube with tube feeds          Nausea and vomiting    - h/o constipation, reports having regular BM's with bowel regimen  - vomiting episode x 1 at home. Anti-emetics ordered PRN.  - KUB repeated 1/14/18 given increased abdominal pain- mild, generalized bowel distention suggesting ileus.  Diet changed to clears as tolerated for bowel rest   - SHANE tube placed 1/16 for tube feeds. Not tolerating tube feeds, stopped 1/17/18.  - D/C shane 1/19, now in place as of 1/25.  - chronic constipation, Cont bowel regimen.   - GI consulted. See protein calorie malnutrition          Recurrent pleural effusion on right    - CXR in ER with large right pleural effusion with subjective c/o worsening SOB  - thoracentesis completed- 1200 ml removed.  SOB with significant improvement.   - Cell count reviewed and negative for infection.   - resp even and non labored.  O2 sat 94-98%.  - Repeat thora 1/19 negative for infection per cell count  - Reaccumulating fluid on xray 1/21.  - thoracentesis 1/31 - 1.4L removed, negative for infection        Anemia of chronic disease    - Continue daily CBC.  - H&H trending down and will plan to transfuse one unit of PRBCs today for replacement.   - Previously transfused 1/30, continue procrit        Delayed surgical wound healing    - wd vac removed 1/12/18.  Wound healing well. Aquacel AG and Mepilex border dressing applied via wound care recs.           Acute renal failure with tubular necrosis    - HD resumed on previous admission. Now anuric and dialyzes M-W-F.  - Nephrology following.    - HD Sat since held Friday for thora/para.  - HD transitioned to TTS schedule.  - ktm consulted for possible kidney transplant             VTE Risk Mitigation         Ordered     heparin (porcine)  injection 5,000 Units  Every 8 hours     Route:  Subcutaneous        01/31/18 0957     Medium Risk of VTE  Once      01/11/18 0351     Place sequential compression device  Until discontinued      01/11/18 0351          The patients clinical status was discussed at multidisplinary rounds, involving transplant surgery, transplant medicine, pharmacy, nursing, nutrition, and social work    Discharge Planning:  Not a candidate for d/c at this time.     Jin Perez NP  Liver Transplant  [facility]

## 2018-02-03 NOTE — SUBJECTIVE & OBJECTIVE
Scheduled Meds:   albuterol-ipratropium 2.5mg-0.5mg/3mL  3 mL Nebulization Q4H WAKE    atovaquone  1,500 mg Oral Daily    cycloSPORINE  175 mg Oral BID    docusate sodium  100 mg Oral BID    epoetin maurisio (PROCRIT) injection  10,000 Units Intravenous Every Tues, Thurs, Sat    heparin (porcine)  5,000 Units Subcutaneous Q8H    custom IVPB builder   Intravenous Q8H    Followed by    [START ON 2/4/2018] custom IVPB builder   Intravenous Q24H    levetiracetam oral soln  500 mg Oral BID    levothyroxine  75 mcg Oral Before breakfast    metoclopramide HCl  10 mg Oral QID (AC & HS)    omeprazole  40 mg Oral BID AC    custom IVPB builder  2.25 g Intravenous Q12H    polyethylene glycol  17 g Oral BID    psyllium husk (aspartame)  3.4 g Oral BID    ursodiol  300 mg Oral TID     Continuous Infusions:  PRN Meds:sodium chloride, sodium chloride, acetaminophen, bisacodyl, omnipaque, ondansetron, ondansetron, oxyCODONE, prochlorperazine, simethicone, sodium chloride 0.9%    Review of Systems   Constitutional: Positive for activity change, appetite change, chills, fatigue and fever.   HENT: Negative.  Negative for congestion and facial swelling.    Eyes: Negative for pain, discharge and visual disturbance.   Respiratory: Negative for cough, shortness of breath and wheezing.    Cardiovascular: Negative for chest pain and leg swelling.   Gastrointestinal: Positive for abdominal distention, abdominal pain and constipation. Negative for nausea and vomiting.   Genitourinary: Positive for decreased urine volume. Negative for difficulty urinating.        Anuric   Musculoskeletal: Negative for arthralgias, back pain and myalgias.   Skin: Positive for color change and wound. Negative for rash.   Allergic/Immunologic: Positive for immunocompromised state.   Neurological: Negative for dizziness, seizures, weakness and headaches.   Psychiatric/Behavioral: Positive for decreased concentration and dysphoric mood. Negative for  confusion. The patient is not nervous/anxious.    All other systems reviewed and are negative.    Objective:     Vital Signs (Most Recent):  Temp: 99.5 °F (37.5 °C) (02/03/18 1115)  Pulse: 99 (02/03/18 1115)  Resp: 18 (02/03/18 1115)  BP: (!) 106/59 (02/03/18 1115)  SpO2: 95 % (02/03/18 1115) Vital Signs (24h Range):  Temp:  [98.6 °F (37 °C)-100 °F (37.8 °C)] 99.5 °F (37.5 °C)  Pulse:  [] 99  Resp:  [15-22] 18  SpO2:  [94 %-100 %] 95 %  BP: (106-134)/(59-81) 106/59     Weight: 65.3 kg (143 lb 14.4 oz)  Body mass index is 23.23 kg/m².    Intake/Output - Last 3 Shifts       02/01 0700 - 02/02 0659 02/02 0700 - 02/03 0659 02/03 0700 - 02/04 0659    P.O. 365 480     Other  600     NG/GT  270     IV Piggyback 600 100     Total Intake(mL/kg) 965 (14.8) 1450 (22.2)     Urine (mL/kg/hr) 0 (0) 0 (0)     Emesis/NG output 0 (0)      Other 0 (0) 2124 (1.4)     Stool 0 (0)      Blood 0 (0)      Total Output 0 2124      Net +965 -674             Urine Occurrence 0 x      Stool Occurrence 2 x  2 x    Emesis Occurrence 0 x            Physical Exam   Constitutional: He is oriented to person, place, and time. He appears well-developed. No distress.   Temporal and distal extremity muscle wasting   HENT:   Head: Normocephalic and atraumatic.   Mouth/Throat: No oropharyngeal exudate.   dylan tube   Eyes: EOM are normal. Pupils are equal, round, and reactive to light. Scleral icterus is present.   Neck: Normal range of motion. Neck supple. No JVD present. No thyromegaly present.   Cardiovascular: Normal rate, regular rhythm, normal heart sounds and intact distal pulses.    No murmur heard.  Pulmonary/Chest: Effort normal. No respiratory distress. He has decreased breath sounds in the right middle field, the right lower field and the left lower field. He has no wheezes. He exhibits no tenderness.   Diminished to RLL   Abdominal: Soft. Bowel sounds are normal. He exhibits ascites. He exhibits no distension. There is tenderness (mild).  There is no rebound and no guarding.   Dressing to chevron.  Wd vac removed 1/12/18  Dependent edema present R flank; surrounding erythema   Musculoskeletal: Normal range of motion. He exhibits edema (2+ LE edema). He exhibits no tenderness.   Neurological: He is alert and oriented to person, place, and time. He has normal reflexes.   Skin: Skin is warm and dry. Capillary refill takes 2 to 3 seconds. He is not diaphoretic. No erythema.   jaundice   Psychiatric: He has a normal mood and affect. His behavior is normal. Judgment and thought content normal. His mood appears not anxious.   Depressed mood   Nursing note and vitals reviewed.      Laboratory:  Immunosuppressants         Stop Route Frequency     cycloSPORINE (NEORAL) 100 mg/mL microemulsion solution 175 mg      -- Oral 2 times daily        CBC:   Recent Labs  Lab 02/03/18  0530   WBC 3.28*   RBC 2.08*   HGB 6.6*   HCT 20.2*   *   MCV 97   MCH 31.7*   MCHC 32.7     CMP:   Recent Labs  Lab 02/03/18  0530   GLU 83   CALCIUM 9.1   ALBUMIN 1.9*   PROT 5.8*   *   K 3.9   CO2 24      BUN 28*   CREATININE 3.7*   ALKPHOS 129   ALT 6*   AST 19   BILITOT 2.8*     Coagulation: No results for input(s): PT, INR, APTT in the last 168 hours.  Labs within the past 24 hours have been reviewed.    Diagnostic Results:  None

## 2018-02-03 NOTE — ASSESSMENT & PLAN NOTE
28 y/o male with a history of alcoholic hepatitis  - s/p liver transplant  10/19/17  - CMV D+/R+  - induction: steroids  - maintenance: cyclosporine  - OI prophylaxis: isavuconazole  - complications: seizures, LAURA, superificial wound infection, several admissions with culture negative peritonitis due to suspected indolent biliary leakage   - ID following for fever  - ERCP 1/30/18: one stent removed from biliary tree, Two plastic stents were placed into the common bile duct  - Chest/A/P CT 1/28/18: Moderate sized right pleural effusion, moderate amount of partially loculated intraperitoneal fluid throughout the abdomen, largest pocket in the right lower quadrant/ pelvis  - thoracentesis 2/1/18: 130 wbc (34% N), Protein ratio 0.5, culture NGTD  - B/C 2/1/18 NGTD  - CT sinus 2/1/18: negative   - CT chest A/P 2/1/18: Increased patchy and confluent consolidative change with air bronchograms involving the right middle lobe and left lower lobe   - suspect cause of fever is from intraabdominal source (IA fluid collections)   - fever curve improving on empiric zosyn    Recommendations:  - would transition to Augmentin to complete a 14 days course for presumed cholangitis (last day 2/14/18)

## 2018-02-03 NOTE — PROGRESS NOTES
Transplant resident notified about missing suture found when this RN was changing pt's central line dressing. MD stated will come to bed to assess. WCTM.

## 2018-02-03 NOTE — ASSESSMENT & PLAN NOTE
- tbili with increase prompting ERCP 1/30 with stones and sludge, stents placed  - T bili now improving. Trend daily.

## 2018-02-03 NOTE — ASSESSMENT & PLAN NOTE
- Continue daily CBC.  - H&H trending down and will plan to transfuse one unit of PRBCs today for replacement.   - Previously transfused 1/30, continue procrit

## 2018-02-03 NOTE — SUBJECTIVE & OBJECTIVE
Interval History: fever curve trending down; otherwise no change    Review of Systems   Constitutional: Positive for fatigue. Negative for chills and fever.   HENT: Negative for sore throat.    Respiratory: Negative for cough, chest tightness and shortness of breath.    Cardiovascular: Negative for chest pain, palpitations and leg swelling.   Gastrointestinal: Positive for abdominal distention and abdominal pain. Negative for diarrhea, nausea and vomiting.   Genitourinary: Negative for dysuria, flank pain and urgency.   Skin: Negative for rash.   Neurological: Negative for dizziness, light-headedness and numbness.   Psychiatric/Behavioral: Negative for confusion.     Objective:     Vital Signs (Most Recent):  Temp: 99 °F (37.2 °C) (02/03/18 1145)  Pulse: 91 (02/03/18 1154)  Resp: 18 (02/03/18 1154)  BP: (!) 106/58 (02/03/18 1145)  SpO2: 96 % (02/03/18 1154) Vital Signs (24h Range):  Temp:  [98.6 °F (37 °C)-100 °F (37.8 °C)] 99 °F (37.2 °C)  Pulse:  [] 91  Resp:  [15-20] 18  SpO2:  [94 %-100 %] 96 %  BP: (106-123)/(58-71) 106/58     Weight: 65.3 kg (143 lb 14.4 oz)  Body mass index is 23.23 kg/m².    Estimated Creatinine Clearance: 26.8 mL/min (based on SCr of 3.7 mg/dL (H)).    Physical Exam   Constitutional: He is oriented to person, place, and time.   HENT:   Mouth/Throat: No oropharyngeal exudate.   Eyes: No scleral icterus.   Cardiovascular: Normal rate and regular rhythm.    Pulmonary/Chest: Effort normal and breath sounds normal. No respiratory distress. He has no wheezes. He has no rales.   Abdominal: Soft. There is no tenderness. There is no rebound.   Surgical incision wrapped   Musculoskeletal: He exhibits edema.   Lymphadenopathy:     He has no cervical adenopathy.   Neurological: He is alert and oriented to person, place, and time. No cranial nerve deficit.   Skin: He is not diaphoretic.       Significant Labs:   Bilirubin:   Recent Labs  Lab 01/05/18  0805  01/12/18  0627  01/30/18  5288  01/31/18  0423 02/01/18  0445 02/02/18  0436 02/03/18  0530   BILIDIR 2.2*  --  2.0*  --   --   --   --   --   --    BILITOT 3.2*  < > 2.7*  < > 4.2* 5.1* 4.0* 3.3* 2.8*   < > = values in this interval not displayed.  Blood Culture:     Recent Labs  Lab 01/17/18  1651 01/18/18  1922 01/18/18  1927 01/28/18  1122 01/30/18 1916   LABBLOO No growth after 5 days. No growth after 5 days. No growth after 5 days. No growth after 5 days. No Growth to date  No Growth to date  No Growth to date  No Growth to date  No Growth to date  No Growth to date  No Growth to date  No Growth to date     BMP:     Recent Labs  Lab 02/03/18  0530   GLU 83   *   K 3.9      CO2 24   BUN 28*   CREATININE 3.7*   CALCIUM 9.1   MG 1.9     CBC:     Recent Labs  Lab 02/02/18  0436 02/03/18  0530   WBC 4.26 3.28*   HGB 7.0* 6.6*   HCT 21.4* 20.2*   * 120*     CMP:     Recent Labs  Lab 02/02/18  0436 02/03/18  0530    135*   K 3.7 3.9    100   CO2 23 24   GLU 97 83   BUN 32* 28*   CREATININE 4.6* 3.7*   CALCIUM 9.1 9.1   PROT 5.4* 5.8*   ALBUMIN 1.8* 1.9*   BILITOT 3.3* 2.8*   ALKPHOS 132 129   AST 25 19   ALT 7* 6*   ANIONGAP 13 11   EGFRNONAA 16.1* 21.0*     Microbiology Results (last 7 days)     Procedure Component Value Units Date/Time    Blood culture [[id number]] Collected:  01/30/18 1916    Order Status:  Completed Specimen:  Blood Updated:  02/02/18 2212     Blood Culture, Routine No Growth to date     Blood Culture, Routine No Growth to date     Blood Culture, Routine No Growth to date     Blood Culture, Routine No Growth to date    Blood culture [[id number]] Collected:  01/30/18 1916    Order Status:  Completed Specimen:  Blood Updated:  02/02/18 2212     Blood Culture, Routine No Growth to date     Blood Culture, Routine No Growth to date     Blood Culture, Routine No Growth to date     Blood Culture, Routine No Growth to date    Blood culture [[id number]] Collected:  01/28/18 1122    Order Status:   Completed Specimen:  Blood Updated:  02/02/18 1412     Blood Culture, Routine No growth after 5 days.    Culture, Anaerobic [[id number]] Collected:  01/31/18 1344    Order Status:  Completed Specimen:  Body Fluid from Pleural Fluid Updated:  02/02/18 0908     Anaerobic Culture Culture in progress    Respiratory Viral Panel by PCR [facility]; Nasal Swab [[id number]] Collected:  02/01/18 1552    Order Status:  Sent Specimen:  Respiratory Updated:  02/01/18 1553    Culture, Body Fluid (Aerobic) w/ GS [[id number]] Collected:  01/31/18 1344    Order Status:  Completed Specimen:  Body Fluid from Pleural Fluid Updated:  01/31/18 2156     Gram Stain Result Rare WBC's      No organisms seen    Fungus culture [[id number]] Collected:  01/31/18 1344    Order Status:  Sent Specimen:  Body Fluid from Pleural Fluid Updated:  01/31/18 1540    Blood culture [[id number]]     Order Status:  Canceled Specimen:  Blood     Blood culture [[id number]]     Order Status:  Canceled Specimen:  Blood     Blood culture [[id number]]     Order Status:  Canceled Specimen:  Blood     Urine culture [[id number]]     Order Status:  Canceled Specimen:  Urine           Significant Imaging: I have reviewed all pertinent imaging results/findings within the past 24 hours.

## 2018-02-03 NOTE — ASSESSMENT & PLAN NOTE
- Pt afebrile now overnight.   - Started on vanc/cefepime for ERCP. Now transitioned to vanc/zosyn   - Blood cultures sent, prelim NGTD.   - thoracentesis 2/1 - fluid negative for infection  - atelectasis noted on ct scan - start breathing tx & CPT

## 2018-02-03 NOTE — ASSESSMENT & PLAN NOTE
- Post op course complicated by fevers and hyperbilirubinemia.  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  - AST/ALT normal. Tbili/alkphos remain elevated.  - Liver US 1/8 showed 3.9 cm complex fluid collection anterior to right lobe and moderate nonspecific complex ascites inferior to transplant.   - IR placed drain 1/11/18, cell count negative for infection.  - PBS consulted. PBS not comfortable proceeding with EUS with liver biopsy given fever and possible infectious process going on. ERCP 1/17 with stone and sludge. Pt placed on Actigall - treated with 10 day course of abx.  ID now signed off.  - Bili elevated - liver u/s 1/28  - Liver biopsy 1/23 without rejection.  - consulted PBS for ERCP - Choledocholithiasis was found along with sludge, stents placed 1/30  - T bili now improving.

## 2018-02-04 PROBLEM — B25.9 CMV (CYTOMEGALOVIRUS) INFECTION: Status: ACTIVE | Noted: 2018-02-04

## 2018-02-04 LAB
ALBUMIN SERPL BCP-MCNC: 1.8 G/DL
ALP SERPL-CCNC: 115 U/L
ALT SERPL W/O P-5'-P-CCNC: 6 U/L
ANION GAP SERPL CALC-SCNC: 11 MMOL/L
AST SERPL-CCNC: 17 U/L
BACTERIA BLD CULT: NORMAL
BACTERIA BLD CULT: NORMAL
BASOPHILS # BLD AUTO: 0.02 K/UL
BASOPHILS NFR BLD: 0.7 %
BILIRUB SERPL-MCNC: 2.8 MG/DL
BUN SERPL-MCNC: 39 MG/DL
CALCIUM SERPL-MCNC: 9.5 MG/DL
CHLORIDE SERPL-SCNC: 99 MMOL/L
CO2 SERPL-SCNC: 25 MMOL/L
CREAT SERPL-MCNC: 4.7 MG/DL
CYCLOSPORINE BLD LC/MS/MS-MCNC: 181 NG/ML
DIFFERENTIAL METHOD: ABNORMAL
EOSINOPHIL # BLD AUTO: 0.3 K/UL
EOSINOPHIL NFR BLD: 10.9 %
ERYTHROCYTE [DISTWIDTH] IN BLOOD BY AUTOMATED COUNT: 15.4 %
EST. GFR  (AFRICAN AMERICAN): 18.2 ML/MIN/1.73 M^2
EST. GFR  (NON AFRICAN AMERICAN): 15.7 ML/MIN/1.73 M^2
GLUCOSE SERPL-MCNC: 79 MG/DL
HCT VFR BLD AUTO: 21.7 %
HGB BLD-MCNC: 7.1 G/DL
IMM GRANULOCYTES # BLD AUTO: 0.04 K/UL
IMM GRANULOCYTES NFR BLD AUTO: 1.3 %
LYMPHOCYTES # BLD AUTO: 0.7 K/UL
LYMPHOCYTES NFR BLD: 22.8 %
MAGNESIUM SERPL-MCNC: 1.9 MG/DL
MCH RBC QN AUTO: 31.4 PG
MCHC RBC AUTO-ENTMCNC: 32.7 G/DL
MCV RBC AUTO: 96 FL
MONOCYTES # BLD AUTO: 0.4 K/UL
MONOCYTES NFR BLD: 13.2 %
NEUTROPHILS # BLD AUTO: 1.6 K/UL
NEUTROPHILS NFR BLD: 51.1 %
NRBC BLD-RTO: 0 /100 WBC
PHOSPHATE SERPL-MCNC: 3.4 MG/DL
PLATELET # BLD AUTO: 119 K/UL
PMV BLD AUTO: 11.5 FL
POTASSIUM SERPL-SCNC: 4 MMOL/L
PROT SERPL-MCNC: 5.6 G/DL
RBC # BLD AUTO: 2.26 M/UL
SODIUM SERPL-SCNC: 135 MMOL/L
WBC # BLD AUTO: 3.03 K/UL

## 2018-02-04 PROCEDURE — 80158 DRUG ASSAY CYCLOSPORINE: CPT

## 2018-02-04 PROCEDURE — 83735 ASSAY OF MAGNESIUM: CPT

## 2018-02-04 PROCEDURE — 84100 ASSAY OF PHOSPHORUS: CPT

## 2018-02-04 PROCEDURE — 94640 AIRWAY INHALATION TREATMENT: CPT

## 2018-02-04 PROCEDURE — 63600175 PHARM REV CODE 636 W HCPCS: Performed by: NURSE PRACTITIONER

## 2018-02-04 PROCEDURE — 99233 SBSQ HOSP IP/OBS HIGH 50: CPT | Mod: ,,, | Performed by: NURSE PRACTITIONER

## 2018-02-04 PROCEDURE — 80053 COMPREHEN METABOLIC PANEL: CPT

## 2018-02-04 PROCEDURE — 63600175 PHARM REV CODE 636 W HCPCS: Performed by: PHYSICIAN ASSISTANT

## 2018-02-04 PROCEDURE — 85025 COMPLETE CBC W/AUTO DIFF WBC: CPT

## 2018-02-04 PROCEDURE — 25000003 PHARM REV CODE 250: Performed by: NURSE PRACTITIONER

## 2018-02-04 PROCEDURE — 99233 SBSQ HOSP IP/OBS HIGH 50: CPT | Mod: ,,, | Performed by: INTERNAL MEDICINE

## 2018-02-04 PROCEDURE — 20600001 HC STEP DOWN PRIVATE ROOM

## 2018-02-04 PROCEDURE — 99900035 HC TECH TIME PER 15 MIN (STAT)

## 2018-02-04 PROCEDURE — 25000003 PHARM REV CODE 250: Performed by: SURGERY

## 2018-02-04 PROCEDURE — 25000242 PHARM REV CODE 250 ALT 637 W/ HCPCS: Performed by: NURSE PRACTITIONER

## 2018-02-04 PROCEDURE — 94761 N-INVAS EAR/PLS OXIMETRY MLT: CPT

## 2018-02-04 PROCEDURE — 63600175 PHARM REV CODE 636 W HCPCS: Mod: JG | Performed by: SURGERY

## 2018-02-04 PROCEDURE — 94664 DEMO&/EVAL PT USE INHALER: CPT

## 2018-02-04 RX ORDER — SODIUM CHLORIDE 9 MG/ML
INJECTION, SOLUTION INTRAVENOUS
Status: DISCONTINUED | OUTPATIENT
Start: 2018-02-05 | End: 2018-02-09

## 2018-02-04 RX ORDER — VALGANCICLOVIR HYDROCHLORIDE 50 MG/ML
200 POWDER, FOR SOLUTION ORAL
Status: DISCONTINUED | OUTPATIENT
Start: 2018-02-05 | End: 2018-02-04

## 2018-02-04 RX ADMIN — OXYCODONE HYDROCHLORIDE 5 MG: 5 TABLET ORAL at 12:02

## 2018-02-04 RX ADMIN — LEVETIRACETAM 500 MG: 100 SOLUTION ORAL at 09:02

## 2018-02-04 RX ADMIN — CYCLOSPORINE 175 MG: 100 SOLUTION ORAL at 08:02

## 2018-02-04 RX ADMIN — OXYCODONE HYDROCHLORIDE 5 MG: 5 TABLET ORAL at 11:02

## 2018-02-04 RX ADMIN — HEPARIN SODIUM 5000 UNITS: 5000 INJECTION, SOLUTION INTRAVENOUS; SUBCUTANEOUS at 01:02

## 2018-02-04 RX ADMIN — HEPARIN SODIUM 5000 UNITS: 5000 INJECTION, SOLUTION INTRAVENOUS; SUBCUTANEOUS at 09:02

## 2018-02-04 RX ADMIN — IPRATROPIUM BROMIDE AND ALBUTEROL SULFATE 3 ML: .5; 3 SOLUTION RESPIRATORY (INHALATION) at 11:02

## 2018-02-04 RX ADMIN — OXYCODONE HYDROCHLORIDE 5 MG: 5 TABLET ORAL at 06:02

## 2018-02-04 RX ADMIN — ONDANSETRON HYDROCHLORIDE 4 MG: 2 INJECTION, SOLUTION INTRAMUSCULAR; INTRAVENOUS at 06:02

## 2018-02-04 RX ADMIN — OXYCODONE HYDROCHLORIDE 5 MG: 5 TABLET ORAL at 03:02

## 2018-02-04 RX ADMIN — IPRATROPIUM BROMIDE AND ALBUTEROL SULFATE 3 ML: .5; 3 SOLUTION RESPIRATORY (INHALATION) at 07:02

## 2018-02-04 RX ADMIN — METOCLOPRAMIDE HYDROCHLORIDE 10 MG: 5 SOLUTION ORAL at 09:02

## 2018-02-04 RX ADMIN — ISAVUCONAZONIUM SULFATE: 74.4 INJECTION, POWDER, LYOPHILIZED, FOR SOLUTION INTRAVENOUS at 05:02

## 2018-02-04 RX ADMIN — OXYCODONE HYDROCHLORIDE 5 MG: 5 TABLET ORAL at 07:02

## 2018-02-04 RX ADMIN — METOCLOPRAMIDE HYDROCHLORIDE 10 MG: 5 SOLUTION ORAL at 05:02

## 2018-02-04 RX ADMIN — METOCLOPRAMIDE HYDROCHLORIDE 10 MG: 5 SOLUTION ORAL at 10:02

## 2018-02-04 RX ADMIN — ISAVUCONAZONIUM SULFATE: 74.4 INJECTION, POWDER, LYOPHILIZED, FOR SOLUTION INTRAVENOUS at 01:02

## 2018-02-04 RX ADMIN — SODIUM CHLORIDE: 9 INJECTION, SOLUTION INTRAVENOUS at 11:02

## 2018-02-04 RX ADMIN — VANCOMYCIN HYDROCHLORIDE 750 MG: 1 INJECTION, POWDER, LYOPHILIZED, FOR SOLUTION INTRAVENOUS at 10:02

## 2018-02-04 RX ADMIN — LEVETIRACETAM 500 MG: 100 SOLUTION ORAL at 08:02

## 2018-02-04 RX ADMIN — LEVOTHYROXINE SODIUM 75 MCG: 75 TABLET ORAL at 06:02

## 2018-02-04 RX ADMIN — OMEPRAZOLE 40 MG: 40 CAPSULE, DELAYED RELEASE ORAL at 03:02

## 2018-02-04 RX ADMIN — HEPARIN SODIUM 5000 UNITS: 5000 INJECTION, SOLUTION INTRAVENOUS; SUBCUTANEOUS at 06:02

## 2018-02-04 RX ADMIN — CYCLOSPORINE 175 MG: 100 SOLUTION ORAL at 06:02

## 2018-02-04 RX ADMIN — ATOVAQUONE 1500 MG: 750 SUSPENSION ORAL at 08:02

## 2018-02-04 RX ADMIN — URSODIOL 300 MG: 300 CAPSULE ORAL at 09:02

## 2018-02-04 RX ADMIN — URSODIOL 300 MG: 300 CAPSULE ORAL at 06:02

## 2018-02-04 RX ADMIN — IPRATROPIUM BROMIDE AND ALBUTEROL SULFATE 3 ML: .5; 3 SOLUTION RESPIRATORY (INHALATION) at 03:02

## 2018-02-04 RX ADMIN — OMEPRAZOLE 40 MG: 40 CAPSULE, DELAYED RELEASE ORAL at 06:02

## 2018-02-04 RX ADMIN — PIPERACILLIN AND TAZOBACTAM 2.25 G: 4; .5 INJECTION, POWDER, LYOPHILIZED, FOR SOLUTION INTRAVENOUS; PARENTERAL at 09:02

## 2018-02-04 RX ADMIN — URSODIOL 300 MG: 300 CAPSULE ORAL at 05:02

## 2018-02-04 RX ADMIN — IPRATROPIUM BROMIDE AND ALBUTEROL SULFATE 3 ML: .5; 3 SOLUTION RESPIRATORY (INHALATION) at 08:02

## 2018-02-04 NOTE — SUBJECTIVE & OBJECTIVE
Scheduled Meds:   albuterol-ipratropium 2.5mg-0.5mg/3mL  3 mL Nebulization Q4H WAKE    atovaquone  1,500 mg Oral Daily    cycloSPORINE  175 mg Oral BID    docusate sodium  100 mg Oral BID    epoetin maurisio (PROCRIT) injection  10,000 Units Intravenous Every Tues, Thurs, Sat    heparin (porcine)  5,000 Units Subcutaneous Q8H    custom IVPB builder   Intravenous Q8H    Followed by    custom IVPB builder   Intravenous Q24H    levetiracetam oral soln  500 mg Oral BID    levothyroxine  75 mcg Oral Before breakfast    metoclopramide HCl  10 mg Oral QID (AC & HS)    omeprazole  40 mg Oral BID AC    custom IVPB builder  2.25 g Intravenous Q12H    polyethylene glycol  17 g Oral BID    psyllium husk (aspartame)  3.4 g Oral BID    ursodiol  300 mg Oral TID    vancomycin (VANCOCIN) IVPB  750 mg Intravenous Once     Continuous Infusions:  PRN Meds:sodium chloride, sodium chloride, acetaminophen, bisacodyl, omnipaque, ondansetron, ondansetron, oxyCODONE, prochlorperazine, simethicone, sodium chloride 0.9%    Review of Systems   Constitutional: Positive for activity change, appetite change, chills, fatigue and fever.   HENT: Negative.  Negative for congestion and facial swelling.    Eyes: Negative for pain, discharge and visual disturbance.   Respiratory: Negative for cough, shortness of breath and wheezing.    Cardiovascular: Negative for chest pain and leg swelling.   Gastrointestinal: Positive for abdominal distention, abdominal pain and constipation. Negative for nausea and vomiting.   Genitourinary: Positive for decreased urine volume. Negative for difficulty urinating.        Anuric   Musculoskeletal: Negative for arthralgias, back pain and myalgias.   Skin: Positive for color change and wound. Negative for rash.   Allergic/Immunologic: Positive for immunocompromised state.   Neurological: Negative for dizziness, seizures, weakness and headaches.   Psychiatric/Behavioral: Positive for decreased concentration  and dysphoric mood. Negative for confusion. The patient is not nervous/anxious.    All other systems reviewed and are negative.    Objective:     Vital Signs (Most Recent):  Temp: 98.5 °F (36.9 °C) (02/04/18 1151)  Pulse: 92 (02/04/18 1153)  Resp: 16 (02/04/18 1153)  BP: 112/65 (02/04/18 1151)  SpO2: 97 % (02/04/18 1151) Vital Signs (24h Range):  Temp:  [98.2 °F (36.8 °C)-100.8 °F (38.2 °C)] 98.5 °F (36.9 °C)  Pulse:  [] 92  Resp:  [16-20] 16  SpO2:  [94 %-97 %] 97 %  BP: (110-122)/(63-75) 112/65     Weight: 65.3 kg (143 lb 14.4 oz)  Body mass index is 23.23 kg/m².    Intake/Output - Last 3 Shifts       02/02 0700 - 02/03 0659 02/03 0700 - 02/04 0659 02/04 0700 - 02/05 0659    P.O. 480 320 120    Blood  221.3     Other 600      NG/      IV Piggyback 100 600     Total Intake(mL/kg) 1450 (22.2) 1141.3 (17.5) 120 (1.8)    Urine (mL/kg/hr) 0 (0) 0 (0)     Emesis/NG output       Other 2124 (1.4)      Stool  0 (0)     Blood       Total Output 2124 0      Net -674 +1141.3 +120           Urine Occurrence  0 x     Stool Occurrence  3 x     Emesis Occurrence  0 x           Physical Exam   Constitutional: He is oriented to person, place, and time. He appears well-developed. No distress.   Temporal and distal extremity muscle wasting   HENT:   Head: Normocephalic and atraumatic.   Mouth/Throat: No oropharyngeal exudate.   dylan tube   Eyes: EOM are normal. Pupils are equal, round, and reactive to light. Scleral icterus is present.   Neck: Normal range of motion. Neck supple. No JVD present. No thyromegaly present.   Cardiovascular: Normal rate, regular rhythm, normal heart sounds and intact distal pulses.    No murmur heard.  Pulmonary/Chest: Effort normal. No respiratory distress. He has decreased breath sounds in the right middle field, the right lower field and the left lower field. He has no wheezes. He exhibits no tenderness.   Diminished to RLL   Abdominal: Soft. Bowel sounds are normal. He exhibits ascites. He  exhibits no distension. There is tenderness (mild). There is no rebound and no guarding.   Dressing to chevron.  Wd vac removed 1/12/18  Dependent edema present R flank; surrounding erythema   Musculoskeletal: Normal range of motion. He exhibits edema (2+ LE edema). He exhibits no tenderness.   Neurological: He is alert and oriented to person, place, and time. He has normal reflexes.   Skin: Skin is warm and dry. Capillary refill takes 2 to 3 seconds. He is not diaphoretic. No erythema.   jaundice   Psychiatric: He has a normal mood and affect. His behavior is normal. Judgment and thought content normal. His mood appears not anxious.   Depressed mood   Nursing note and vitals reviewed.      Laboratory:  Immunosuppressants         Stop Route Frequency     cycloSPORINE (NEORAL) 100 mg/mL microemulsion solution 175 mg      -- Oral 2 times daily        CBC:   Recent Labs  Lab 02/04/18  0500   WBC 3.03*   RBC 2.26*   HGB 7.1*   HCT 21.7*   *   MCV 96   MCH 31.4*   MCHC 32.7     CMP:   Recent Labs  Lab 02/04/18  0500   GLU 79   CALCIUM 9.5   ALBUMIN 1.8*   PROT 5.6*   *   K 4.0   CO2 25   CL 99   BUN 39*   CREATININE 4.7*   ALKPHOS 115   ALT 6*   AST 17   BILITOT 2.8*     Coagulation: No results for input(s): PT, INR, APTT in the last 168 hours.  Labs within the past 24 hours have been reviewed.    Diagnostic Results:  None

## 2018-02-04 NOTE — SUBJECTIVE & OBJECTIVE
Interval History: febrile to 100.8 o/n still c/o diffuse abdominal pain and pain at thoracentesis site    Review of Systems   Constitutional: Negative for activity change, appetite change, chills, fatigue and fever.   HENT: Negative for congestion, dental problem, mouth sores and sinus pressure.    Eyes: Negative for pain, redness and visual disturbance.   Respiratory: Negative for cough, shortness of breath and wheezing.    Cardiovascular: Negative for chest pain and leg swelling.   Gastrointestinal: Positive for abdominal pain. Negative for abdominal distention, diarrhea, nausea and vomiting.   Endocrine: Negative for polyuria.   Genitourinary: Negative for decreased urine volume, dysuria and frequency.   Musculoskeletal: Negative for joint swelling.   Skin: Negative for color change.   Allergic/Immunologic: Negative for food allergies.   Neurological: Negative for dizziness, weakness and headaches.   Hematological: Negative for adenopathy.   Psychiatric/Behavioral: Negative for agitation and confusion. The patient is not nervous/anxious.      Objective:     Vital Signs (Most Recent):  Temp: 99.4 °F (37.4 °C) (02/04/18 0717)  Pulse: 82 (02/04/18 0839)  Resp: 16 (02/04/18 0839)  BP: 116/73 (02/04/18 0717)  SpO2: 96 % (02/04/18 0717) Vital Signs (24h Range):  Temp:  [98.2 °F (36.8 °C)-100.8 °F (38.2 °C)] 99.4 °F (37.4 °C)  Pulse:  [] 82  Resp:  [16-20] 16  SpO2:  [94 %-97 %] 96 %  BP: (106-122)/(58-75) 116/73     Weight: 65.3 kg (143 lb 14.4 oz)  Body mass index is 23.23 kg/m².    Estimated Creatinine Clearance: 21.1 mL/min (based on SCr of 4.7 mg/dL (H)).    Physical Exam   Constitutional: He is oriented to person, place, and time. He appears well-developed and well-nourished.   HENT:   Head: Normocephalic and atraumatic.   Mouth/Throat: Oropharynx is clear and moist.   Eyes: Conjunctivae are normal.   Neck: Neck supple.   Cardiovascular: Normal rate, regular rhythm and normal heart sounds.    No murmur  heard.  Pulmonary/Chest: Effort normal and breath sounds normal. No respiratory distress. He has no wheezes.   Abdominal: Soft. Bowel sounds are normal. He exhibits no distension. There is no tenderness.   Musculoskeletal: Normal range of motion. He exhibits no edema or tenderness.   Lymphadenopathy:     He has no cervical adenopathy.   Neurological: He is alert and oriented to person, place, and time. Coordination normal.   Skin: Skin is warm and dry. No rash noted.   Psychiatric: He has a normal mood and affect. His behavior is normal.       Significant Labs:   CBC:   Recent Labs  Lab 02/03/18  0530 02/04/18  0500   WBC 3.28* 3.03*   HGB 6.6* 7.1*   HCT 20.2* 21.7*   * 119*     CMP:   Recent Labs  Lab 02/03/18  0530 02/04/18  0500   * 135*   K 3.9 4.0    99   CO2 24 25   GLU 83 79   BUN 28* 39*   CREATININE 3.7* 4.7*   CALCIUM 9.1 9.5   PROT 5.8* 5.6*   ALBUMIN 1.9* 1.8*   BILITOT 2.8* 2.8*   ALKPHOS 129 115   AST 19 17   ALT 6* 6*   ANIONGAP 11 11   EGFRNONAA 21.0* 15.7*       Significant Imaging: I have reviewed all pertinent imaging results/findings within the past 24 hours.     Micro  2/1 RVP pending  1/31 fluid cx NGTD  1/30 bcx NGTD  2/1 asp ag negative  2/1 BDG pending  2/1 CMV quant 370 IU/ml  1/31 pleural fluid cx NGTd, 130 WBCs 34% segs

## 2018-02-04 NOTE — ASSESSMENT & PLAN NOTE
- detected on last CMV PCR.   - case discussed with ID and will hold off on treatment at this time given low WBC.

## 2018-02-04 NOTE — PROGRESS NOTES
[facility]  Infectious Disease  Progress Note    Patient Name: Jhonny Diana  MRN: [id number]  Admission Date: 1/11/2018  Length of Stay: 24 days  Attending Physician: Nathanael Medina MD  Primary Care Provider: Primary Doctor No    Isolation Status: No active isolations  Assessment/Plan:      Fever    27-year-old male with a history of alcoholic cirrhosis s/p DDLT 10/19/2017, CMV D+/R+, steroid induction, on cyclosporine c/b multiple hospitalizations for culture negative peritonitis, now with recurrent fevers and persistent abdominal pain. Recurrent fever started in setting of recent ERCP concerning for bacteremia associated with procedure. However, patient with persistent abdominal pain and imaging notable for multiple loculated fluid collections doing well s/p ERCP now again with low grade fever of 100.8 and mild CMV reactivation  - at this time ok to watch CMV quant and consider therapy soon  - more importantly agree with eval for IR drainage of any IA drainable collection                Anticipated Disposition: pending    Thank you for your consult. I will follow-up with patient. Please contact us if you have any additional questions.    Santos Osborn MD  Infectious Disease  [facility]    Subjective:     Principal Problem:Fever    HPI: No notes on file  Interval History: febrile to 100.8 o/n still c/o diffuse abdominal pain and pain at thoracentesis site    Review of Systems   Constitutional: Negative for activity change, appetite change, chills, fatigue and fever.   HENT: Negative for congestion, dental problem, mouth sores and sinus pressure.    Eyes: Negative for pain, redness and visual disturbance.   Respiratory: Negative for cough, shortness of breath and wheezing.    Cardiovascular: Negative for chest pain and leg swelling.   Gastrointestinal: Positive for abdominal pain. Negative for abdominal distention, diarrhea, nausea and vomiting.   Endocrine: Negative for  polyuria.   Genitourinary: Negative for decreased urine volume, dysuria and frequency.   Musculoskeletal: Negative for joint swelling.   Skin: Negative for color change.   Allergic/Immunologic: Negative for food allergies.   Neurological: Negative for dizziness, weakness and headaches.   Hematological: Negative for adenopathy.   Psychiatric/Behavioral: Negative for agitation and confusion. The patient is not nervous/anxious.      Objective:     Vital Signs (Most Recent):  Temp: 99.4 °F (37.4 °C) (02/04/18 0717)  Pulse: 82 (02/04/18 0839)  Resp: 16 (02/04/18 0839)  BP: 116/73 (02/04/18 0717)  SpO2: 96 % (02/04/18 0717) Vital Signs (24h Range):  Temp:  [98.2 °F (36.8 °C)-100.8 °F (38.2 °C)] 99.4 °F (37.4 °C)  Pulse:  [] 82  Resp:  [16-20] 16  SpO2:  [94 %-97 %] 96 %  BP: (106-122)/(58-75) 116/73     Weight: 65.3 kg (143 lb 14.4 oz)  Body mass index is 23.23 kg/m².    Estimated Creatinine Clearance: 21.1 mL/min (based on SCr of 4.7 mg/dL (H)).    Physical Exam   Constitutional: He is oriented to person, place, and time. He appears well-developed and well-nourished.   HENT:   Head: Normocephalic and atraumatic.   Mouth/Throat: Oropharynx is clear and moist.   Eyes: Conjunctivae are normal.   Neck: Neck supple.   Cardiovascular: Normal rate, regular rhythm and normal heart sounds.    No murmur heard.  Pulmonary/Chest: Effort normal and breath sounds normal. No respiratory distress. He has no wheezes.   Abdominal: Soft. Bowel sounds are normal. He exhibits no distension. There is no tenderness.   Musculoskeletal: Normal range of motion. He exhibits no edema or tenderness.   Lymphadenopathy:     He has no cervical adenopathy.   Neurological: He is alert and oriented to person, place, and time. Coordination normal.   Skin: Skin is warm and dry. No rash noted.   Psychiatric: He has a normal mood and affect. His behavior is normal.       Significant Labs:   CBC:   Recent Labs  Lab 02/03/18  0530 02/04/18  0500   WBC  3.28* 3.03*   HGB 6.6* 7.1*   HCT 20.2* 21.7*   * 119*     CMP:   Recent Labs  Lab 02/03/18  0530 02/04/18  0500   * 135*   K 3.9 4.0    99   CO2 24 25   GLU 83 79   BUN 28* 39*   CREATININE 3.7* 4.7*   CALCIUM 9.1 9.5   PROT 5.8* 5.6*   ALBUMIN 1.9* 1.8*   BILITOT 2.8* 2.8*   ALKPHOS 129 115   AST 19 17   ALT 6* 6*   ANIONGAP 11 11   EGFRNONAA 21.0* 15.7*       Significant Imaging: I have reviewed all pertinent imaging results/findings within the past 24 hours.     Micro  2/1 RVP pending  1/31 fluid cx NGTD  1/30 bcx NGTD  2/1 asp ag negative  2/1 BDG pending  2/1 CMV quant 370 IU/ml  1/31 pleural fluid cx NGTd, 130 WBCs 34% segs

## 2018-02-04 NOTE — ASSESSMENT & PLAN NOTE
27-year-old male with a history of alcoholic cirrhosis s/p DDLT 10/19/2017, CMV D+/R+, steroid induction, on cyclosporine c/b multiple hospitalizations for culture negative peritonitis, now with recurrent fevers and persistent abdominal pain. Recurrent fever started in setting of recent ERCP concerning for bacteremia associated with procedure. However, patient with persistent abdominal pain and imaging notable for multiple loculated fluid collections doing well s/p ERCP now again with low grade fever of 100.8 and mild CMV reactivation  - at this time ok to watch CMV quant and consider therapy soon  - more importantly agree with eval for IR drainage of any IA drainable collection

## 2018-02-04 NOTE — PLAN OF CARE
Problem: Patient Care Overview  Goal: Plan of Care Review  Outcome: Ongoing (interventions implemented as appropriate)  Pt aao x 4. Pt in bedside chair, call light in reach. Free from falls/injury this shift and wearing non-skid footwear when ambulating. Pt encouraged to call for assistance when needed. Mother at bedside. TF @ 45 cc/hr. PO intake encouraged, pt has not eaten breakfast or lunch. IV zofran offered to be given to pt when food tray arrives to prevent nausea; pt acknowledged but refused. Oxy 5 mg given for c/o pain q 4hrs. IVPB vancomycin given. Pt to be NPO at midnight for possible drainage of fluid collection.      Will continue to monitor, assess, and adjust care as needed.

## 2018-02-04 NOTE — ASSESSMENT & PLAN NOTE
- Post op course complicated by fevers and hyperbilirubinemia.  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  - AST/ALT normal. Tbili/alkphos remain elevated.  - Liver US 1/8 showed 3.9 cm complex fluid collection anterior to right lobe and moderate nonspecific complex ascites inferior to transplant.   - IR placed drain 1/11/18, cell count negative for infection.  - PBS consulted. PBS not comfortable proceeding with EUS with liver biopsy given fever and possible infectious process going on. ERCP 1/17 with stone and sludge. Pt placed on Actigall - treated with 10 day course of abx.  ID now signed off.  - Bili elevated - liver u/s 1/28  - Liver biopsy 1/23 without rejection.  - consulted PBS for ERCP - Choledocholithiasis was found along with sludge, stents placed 1/30  - T bili now improving.   - now spiking fevers again despite being on antibxs. NPO past MN for possible drainage of fluid collections noted on recent imaging study.

## 2018-02-05 PROBLEM — B25.8 OTHER CYTOMEGALOVIRAL DISEASES: Status: ACTIVE | Noted: 2018-02-04

## 2018-02-05 PROBLEM — B25.8 OTHER CYTOMEGALOVIRAL DISEASES: Status: ACTIVE | Noted: 2018-02-05

## 2018-02-05 LAB
1,3 BETA GLUCAN SPEC-MCNC: NORMAL PG/ML
ABO + RH BLD: NORMAL
ALBUMIN SERPL BCP-MCNC: 1.7 G/DL
ALP SERPL-CCNC: 99 U/L
ALT SERPL W/O P-5'-P-CCNC: 5 U/L
ANION GAP SERPL CALC-SCNC: 13 MMOL/L
AST SERPL-CCNC: 16 U/L
BASOPHILS # BLD AUTO: 0.02 K/UL
BASOPHILS NFR BLD: 0.7 %
BILIRUB SERPL-MCNC: 2.5 MG/DL
BLD GP AB SCN CELLS X3 SERPL QL: NORMAL
BUN SERPL-MCNC: 45 MG/DL
CALCIUM SERPL-MCNC: 9.5 MG/DL
CHLORIDE SERPL-SCNC: 98 MMOL/L
CO2 SERPL-SCNC: 22 MMOL/L
CREAT SERPL-MCNC: 5.5 MG/DL
CYCLOSPORINE BLD LC/MS/MS-MCNC: 176 NG/ML
DIFFERENTIAL METHOD: ABNORMAL
ENTEROVIRUS: NOT DETECTED
EOSINOPHIL # BLD AUTO: 0.3 K/UL
EOSINOPHIL NFR BLD: 11.1 %
ERYTHROCYTE [DISTWIDTH] IN BLOOD BY AUTOMATED COUNT: 15 %
EST. GFR  (AFRICAN AMERICAN): 15 ML/MIN/1.73 M^2
EST. GFR  (NON AFRICAN AMERICAN): 13 ML/MIN/1.73 M^2
GLUCOSE SERPL-MCNC: 73 MG/DL
HCT VFR BLD AUTO: 20.6 %
HCT VFR BLD AUTO: 21.8 %
HGB BLD-MCNC: 6.7 G/DL
HGB BLD-MCNC: 7.1 G/DL
HUMAN BOCAVIRUS: NOT DETECTED
HUMAN CORONAVIRUS, COMMON COLD VIRUS: NOT DETECTED
IMM GRANULOCYTES # BLD AUTO: 0.09 K/UL
IMM GRANULOCYTES NFR BLD AUTO: 3 %
INFLUENZA A - H1N1-09: NOT DETECTED
IRON SERPL-MCNC: 22 UG/DL
LYMPHOCYTES # BLD AUTO: 0.8 K/UL
LYMPHOCYTES NFR BLD: 26.5 %
MAGNESIUM SERPL-MCNC: 1.7 MG/DL
MCH RBC QN AUTO: 30.7 PG
MCHC RBC AUTO-ENTMCNC: 32.5 G/DL
MCV RBC AUTO: 95 FL
MONOCYTES # BLD AUTO: 0.4 K/UL
MONOCYTES NFR BLD: 13.4 %
NEUTROPHILS # BLD AUTO: 1.4 K/UL
NEUTROPHILS NFR BLD: 45.3 %
NRBC BLD-RTO: 0 /100 WBC
PARAINFLUENZA: NOT DETECTED
PHOSPHATE SERPL-MCNC: 3.6 MG/DL
PLATELET # BLD AUTO: 133 K/UL
PMV BLD AUTO: 11.1 FL
POTASSIUM SERPL-SCNC: 4 MMOL/L
PREALB SERPL-MCNC: 11 MG/DL
PROT SERPL-MCNC: 5.3 G/DL
RBC # BLD AUTO: 2.18 M/UL
RVP - ADENOVIRUS: NOT DETECTED
RVP - HUMAN METAPNEUMOVIRUS (HMPV): NOT DETECTED
RVP - INFLUENZA A: NOT DETECTED
RVP - INFLUENZA B: NOT DETECTED
RVP - RESPIRATORY SYNCTIAL VIRUS (RSV) A: NOT DETECTED
RVP - RESPIRATORY VIRAL PANEL, SOURCE: NORMAL
RVP - RHINOVIRUS: NOT DETECTED
SATURATED IRON: 16 %
SODIUM SERPL-SCNC: 133 MMOL/L
TOTAL IRON BINDING CAPACITY: 139 UG/DL
TRANSFERRIN SERPL-MCNC: 94 MG/DL
WBC # BLD AUTO: 2.98 K/UL

## 2018-02-05 PROCEDURE — 83540 ASSAY OF IRON: CPT

## 2018-02-05 PROCEDURE — 63600175 PHARM REV CODE 636 W HCPCS: Performed by: NURSE PRACTITIONER

## 2018-02-05 PROCEDURE — 83735 ASSAY OF MAGNESIUM: CPT

## 2018-02-05 PROCEDURE — 94640 AIRWAY INHALATION TREATMENT: CPT

## 2018-02-05 PROCEDURE — 86920 COMPATIBILITY TEST SPIN: CPT

## 2018-02-05 PROCEDURE — 25000003 PHARM REV CODE 250: Performed by: INTERNAL MEDICINE

## 2018-02-05 PROCEDURE — 20600001 HC STEP DOWN PRIVATE ROOM

## 2018-02-05 PROCEDURE — 85018 HEMOGLOBIN: CPT

## 2018-02-05 PROCEDURE — 99233 SBSQ HOSP IP/OBS HIGH 50: CPT | Mod: ,,, | Performed by: INTERNAL MEDICINE

## 2018-02-05 PROCEDURE — 25000003 PHARM REV CODE 250: Performed by: NURSE PRACTITIONER

## 2018-02-05 PROCEDURE — 90935 HEMODIALYSIS ONE EVALUATION: CPT

## 2018-02-05 PROCEDURE — 80053 COMPREHEN METABOLIC PANEL: CPT

## 2018-02-05 PROCEDURE — 84100 ASSAY OF PHOSPHORUS: CPT

## 2018-02-05 PROCEDURE — 63600175 PHARM REV CODE 636 W HCPCS: Mod: JG | Performed by: SURGERY

## 2018-02-05 PROCEDURE — 85014 HEMATOCRIT: CPT

## 2018-02-05 PROCEDURE — 94761 N-INVAS EAR/PLS OXIMETRY MLT: CPT

## 2018-02-05 PROCEDURE — 99239 HOSP IP/OBS DSCHRG MGMT >30: CPT | Mod: ,,, | Performed by: NURSE PRACTITIONER

## 2018-02-05 PROCEDURE — 86901 BLOOD TYPING SEROLOGIC RH(D): CPT

## 2018-02-05 PROCEDURE — 25000242 PHARM REV CODE 250 ALT 637 W/ HCPCS: Performed by: NURSE PRACTITIONER

## 2018-02-05 PROCEDURE — 84134 ASSAY OF PREALBUMIN: CPT

## 2018-02-05 PROCEDURE — 85025 COMPLETE CBC W/AUTO DIFF WBC: CPT

## 2018-02-05 PROCEDURE — 25000003 PHARM REV CODE 250: Performed by: SURGERY

## 2018-02-05 PROCEDURE — 36415 COLL VENOUS BLD VENIPUNCTURE: CPT

## 2018-02-05 PROCEDURE — 63600175 PHARM REV CODE 636 W HCPCS: Performed by: PHYSICIAN ASSISTANT

## 2018-02-05 PROCEDURE — 80158 DRUG ASSAY CYCLOSPORINE: CPT

## 2018-02-05 PROCEDURE — 90935 HEMODIALYSIS ONE EVALUATION: CPT | Mod: ,,, | Performed by: NURSE PRACTITIONER

## 2018-02-05 PROCEDURE — 94664 DEMO&/EVAL PT USE INHALER: CPT

## 2018-02-05 PROCEDURE — 99900035 HC TECH TIME PER 15 MIN (STAT)

## 2018-02-05 RX ORDER — VALGANCICLOVIR HYDROCHLORIDE 50 MG/ML
200 POWDER, FOR SOLUTION ORAL
Status: DISCONTINUED | OUTPATIENT
Start: 2018-02-05 | End: 2018-02-18

## 2018-02-05 RX ORDER — OMEPRAZOLE 40 MG/1
40 CAPSULE, DELAYED RELEASE ORAL EVERY MORNING
Status: DISCONTINUED | OUTPATIENT
Start: 2018-02-06 | End: 2018-04-10 | Stop reason: HOSPADM

## 2018-02-05 RX ORDER — HEPARIN SODIUM 1000 [USP'U]/ML
1000 INJECTION, SOLUTION INTRAVENOUS; SUBCUTANEOUS
Status: COMPLETED | OUTPATIENT
Start: 2018-02-05 | End: 2018-03-09

## 2018-02-05 RX ADMIN — METOCLOPRAMIDE HYDROCHLORIDE 10 MG: 5 SOLUTION ORAL at 12:02

## 2018-02-05 RX ADMIN — ACETAMINOPHEN 650 MG: 325 TABLET, FILM COATED ORAL at 11:02

## 2018-02-05 RX ADMIN — URSODIOL 300 MG: 300 CAPSULE ORAL at 09:02

## 2018-02-05 RX ADMIN — METOCLOPRAMIDE HYDROCHLORIDE 10 MG: 5 SOLUTION ORAL at 05:02

## 2018-02-05 RX ADMIN — HEPARIN SODIUM 5000 UNITS: 5000 INJECTION, SOLUTION INTRAVENOUS; SUBCUTANEOUS at 02:02

## 2018-02-05 RX ADMIN — LEVOTHYROXINE SODIUM 75 MCG: 75 TABLET ORAL at 05:02

## 2018-02-05 RX ADMIN — OXYCODONE HYDROCHLORIDE 5 MG: 5 TABLET ORAL at 02:02

## 2018-02-05 RX ADMIN — ATOVAQUONE 1500 MG: 750 SUSPENSION ORAL at 12:02

## 2018-02-05 RX ADMIN — SODIUM CHLORIDE 300 ML: 0.9 INJECTION, SOLUTION INTRAVENOUS at 09:02

## 2018-02-05 RX ADMIN — URSODIOL 300 MG: 300 CAPSULE ORAL at 05:02

## 2018-02-05 RX ADMIN — IPRATROPIUM BROMIDE AND ALBUTEROL SULFATE 3 ML: .5; 3 SOLUTION RESPIRATORY (INHALATION) at 08:02

## 2018-02-05 RX ADMIN — SODIUM CHLORIDE: 9 INJECTION, SOLUTION INTRAVENOUS at 10:02

## 2018-02-05 RX ADMIN — LEVETIRACETAM 500 MG: 100 SOLUTION ORAL at 09:02

## 2018-02-05 RX ADMIN — MORPHINE 200 MG: 10 SOLUTION ORAL at 05:02

## 2018-02-05 RX ADMIN — URSODIOL 300 MG: 300 CAPSULE ORAL at 02:02

## 2018-02-05 RX ADMIN — HEPARIN SODIUM 1000 UNITS: 1000 INJECTION, SOLUTION INTRAVENOUS; SUBCUTANEOUS at 11:02

## 2018-02-05 RX ADMIN — LEVETIRACETAM 500 MG: 100 SOLUTION ORAL at 12:02

## 2018-02-05 RX ADMIN — CYCLOSPORINE 175 MG: 100 SOLUTION ORAL at 05:02

## 2018-02-05 RX ADMIN — OXYCODONE HYDROCHLORIDE 5 MG: 5 TABLET ORAL at 11:02

## 2018-02-05 RX ADMIN — PIPERACILLIN AND TAZOBACTAM 2.25 G: 4; .5 INJECTION, POWDER, LYOPHILIZED, FOR SOLUTION INTRAVENOUS; PARENTERAL at 02:02

## 2018-02-05 RX ADMIN — OXYCODONE HYDROCHLORIDE 5 MG: 5 TABLET ORAL at 09:02

## 2018-02-05 RX ADMIN — OMEPRAZOLE 40 MG: 40 CAPSULE, DELAYED RELEASE ORAL at 05:02

## 2018-02-05 RX ADMIN — IPRATROPIUM BROMIDE AND ALBUTEROL SULFATE 3 ML: .5; 3 SOLUTION RESPIRATORY (INHALATION) at 04:02

## 2018-02-05 RX ADMIN — ONDANSETRON HYDROCHLORIDE 4 MG: 2 INJECTION, SOLUTION INTRAMUSCULAR; INTRAVENOUS at 02:02

## 2018-02-05 RX ADMIN — PIPERACILLIN AND TAZOBACTAM 2.25 G: 4; .5 INJECTION, POWDER, LYOPHILIZED, FOR SOLUTION INTRAVENOUS; PARENTERAL at 09:02

## 2018-02-05 RX ADMIN — OXYCODONE HYDROCHLORIDE 5 MG: 5 TABLET ORAL at 05:02

## 2018-02-05 RX ADMIN — METOCLOPRAMIDE HYDROCHLORIDE 10 MG: 5 SOLUTION ORAL at 09:02

## 2018-02-05 RX ADMIN — OXYCODONE HYDROCHLORIDE 5 MG: 5 TABLET ORAL at 06:02

## 2018-02-05 RX ADMIN — HEPARIN SODIUM 5000 UNITS: 5000 INJECTION, SOLUTION INTRAVENOUS; SUBCUTANEOUS at 05:02

## 2018-02-05 RX ADMIN — ERYTHROPOIETIN 10000 UNITS: 10000 INJECTION, SOLUTION INTRAVENOUS; SUBCUTANEOUS at 06:02

## 2018-02-05 NOTE — PLAN OF CARE
Pt is AAOx4; Tmax 99.4; vital signs stable. He went to HD this morning; 1.5L removed. He is up independently with a walker. SHANE tube in place at 85. Pt refused tube feeds from being restarted today due to nausea; zofran given. He attempted to eat lunch, but only ate a couple of bites because he said the seasoning was bothering him. Central line to be removed once midline is placed. Mother at bedside, attentive to pt.

## 2018-02-05 NOTE — PROGRESS NOTES
[facility] NOTE     Patient currently on hemodialysis for removal of uremic toxins and volume.     Patient seen and evaluated on hemodialysis, tolerating treatment, see HD flowsheet for vitals and assessments.      Ultrafiltration goal is 1-2L     Labs have been reviewed and the dialysate bath has been adjusted.     Assessment/Plan:  ESRD  Tolerating treatment well with no complaints. Adequate BFR from dialysis access.  -H/H stable, continue VIDA with HD on MWF  -Phos WNL, no need for binders at this time.    LANE Beard, FNP-BC  Nephrology  Pager:  [phone]

## 2018-02-05 NOTE — ASSESSMENT & PLAN NOTE
27-year-old male with a history of alcoholic cirrhosis s/p DDLT 10/19/2017, CMV D+/R+, steroid induction, on cyclosporine c/b multiple hospitalizations for culture negative peritonitis, now with recurrent fevers and persistent abdominal pain. Recurrent fever started in setting of recent ERCP concerning for bacteremia associated with procedure. However, patient with persistent abdominal pain and imaging notable for multiple loculated fluid collections doing well s/p ERCP now again with low grade fever of 100.8 and mild CMV reactivation.  - continue valganciclovir for CMV reactivation  - continue zosyn for now

## 2018-02-05 NOTE — SUBJECTIVE & OBJECTIVE
Interval History: fever curve trending down; otherwise no change    Review of Systems   Constitutional: Negative for chills, fatigue and fever.   HENT: Negative for sore throat.    Respiratory: Negative for cough, chest tightness and shortness of breath.    Cardiovascular: Negative for chest pain, palpitations and leg swelling.   Gastrointestinal: Positive for abdominal pain (improving). Negative for abdominal distention, diarrhea, nausea and vomiting.   Genitourinary: Negative for dysuria, flank pain and urgency.   Skin: Negative for rash.   Neurological: Negative for dizziness, light-headedness and numbness.   Psychiatric/Behavioral: Negative for confusion.     Objective:     Vital Signs (Most Recent):  Temp: 99.4 °F (37.4 °C) (02/05/18 0820)  Pulse: 88 (02/05/18 1015)  Resp: 18 (02/05/18 1015)  BP: 128/77 (02/05/18 1015)  SpO2: 97 % (02/05/18 0820) Vital Signs (24h Range):  Temp:  [98.5 °F (36.9 °C)-99.4 °F (37.4 °C)] 99.4 °F (37.4 °C)  Pulse:  [] 88  Resp:  [14-18] 18  SpO2:  [96 %-98 %] 97 %  BP: (107-129)/(59-78) 128/77     Weight: 67.7 kg (149 lb 4 oz)  Body mass index is 24.09 kg/m².    Estimated Creatinine Clearance: 18 mL/min (based on SCr of 5.5 mg/dL (H)).    Physical Exam   Constitutional: He is oriented to person, place, and time.   HENT:   Mouth/Throat: No oropharyngeal exudate.   Eyes: No scleral icterus.   Cardiovascular: Normal rate and regular rhythm.    Pulmonary/Chest: Effort normal and breath sounds normal. No respiratory distress. He has no wheezes. He has no rales.   Abdominal: Soft. There is no tenderness. There is no rebound.   Surgical incision wrapped   Musculoskeletal: He exhibits edema.   Lymphadenopathy:     He has no cervical adenopathy.   Neurological: He is alert and oriented to person, place, and time. No cranial nerve deficit.   Skin: He is not diaphoretic.       Significant Labs:   Bilirubin:   Recent Labs  Lab 01/12/18  0627  02/01/18  0445 02/02/18  0436 02/03/18  0530  02/04/18  0500 02/05/18  0500   BILIDIR 2.0*  --   --   --   --   --   --    BILITOT 2.7*  < > 4.0* 3.3* 2.8* 2.8* 2.5*   < > = values in this interval not displayed.  Blood Culture:     Recent Labs  Lab 01/17/18  1651 01/18/18  1922 01/18/18  1927 01/28/18  1122 01/30/18  1916   LABBLOO No growth after 5 days. No growth after 5 days. No growth after 5 days. No growth after 5 days. No growth after 5 days.  No growth after 5 days.     BMP:     Recent Labs  Lab 02/05/18  0500   GLU 73   *   K 4.0   CL 98   CO2 22*   BUN 45*   CREATININE 5.5*   CALCIUM 9.5   MG 1.7     CBC:     Recent Labs  Lab 02/04/18  0500 02/05/18  0500 02/05/18  0853   WBC 3.03* 2.98*  --    HGB 7.1* 6.7* 7.1*   HCT 21.7* 20.6* 21.8*   * 133*  --      CMP:     Recent Labs  Lab 02/04/18  0500 02/05/18  0500   * 133*   K 4.0 4.0   CL 99 98   CO2 25 22*   GLU 79 73   BUN 39* 45*   CREATININE 4.7* 5.5*   CALCIUM 9.5 9.5   PROT 5.6* 5.3*   ALBUMIN 1.8* 1.7*   BILITOT 2.8* 2.5*   ALKPHOS 115 99   AST 17 16   ALT 6* 5*   ANIONGAP 11 13   EGFRNONAA 15.7* 13.0*     Microbiology Results (last 7 days)     Procedure Component Value Units Date/Time    Culture, Anaerobic [[id number]] Collected:  01/31/18 1344    Order Status:  Completed Specimen:  Body Fluid from Pleural Fluid Updated:  02/05/18 0954     Anaerobic Culture Culture in progress    Respiratory Viral Panel by PCR [facility]; Nasal Swab [[id number]] Collected:  02/01/18 1552    Order Status:  Completed Specimen:  Respiratory Updated:  02/05/18 0514     Respiratory Virus Panel, source Nasal Swab     RVP - Adenovirus Not Detected     Comment: Detects Serotypes B and E. Detection of Serotype C may   be limited. If Adenovirus infection is suspected and a   Not Detected result is returned the sample should be   re-tested for Adenovirus using an independent method  (e.g. [employer] Adenovirus Quantitative Real-Time  PCR test.          Enterovirus Not Detected     Comment:  Cross-reactivity has been observed between certain Rhinovirus  strains and the Enterovirus assay.          Human Bocavirus Not Detected     Human Coronavirus Not Detected     Comment: The Human Coronavirus assay detects Human coronavirus types  229E, OC43,NL63 and HKU1.          RVP - Human Metapneumovirus (hMPV) Not Detected     RVP - Influenza A Not Detected     Influenza A - G2M9-40 Not Detected     RVP - Influenza B Not Detected     Parainfluenza Not Detected     Respiratory Syncytial VirusVirus (RSV) A Not Detected     Comment: The Respiratory Syncytial Viral assay detects types A and B,  however it does not distinguish between the two.          RVP - Rhinovirus Not Detected     Comment: Cross-Reactivity has been observed between certain   Rhinovirus strains and the Enterovirus assay.  Target Enriched Mulitplex Polymerase Chain Reaction (TEM-PCR)  allows for the detection of multiple pathogens out of a single  reaction.  This test was developed and its performance   characteristics determined by [employer].  It has not   been cleared or approved by the U.S.Food and Drug Administration.  Results should be used in conjunction with clinical findings,   and should not form the sole basis for a diagnosis or treatment  decision.  TEM-PCR is a licensed technology of [employer].         Narrative:       Receiving Lab:->[facility]    Blood culture [[id number]] Collected:  01/30/18 1916    Order Status:  Completed Specimen:  Blood Updated:  02/04/18 2212     Blood Culture, Routine No growth after 5 days.    Blood culture [[id number]] Collected:  01/30/18 1916    Order Status:  Completed Specimen:  Blood Updated:  02/04/18 2212     Blood Culture, Routine No growth after 5 days.    Culture, Body Fluid (Aerobic) w/ GS [[id number]] Collected:  01/31/18 1344    Order Status:  Completed Specimen:  Body Fluid from Pleural Fluid Updated:  02/04/18 0800     AEROBIC CULTURE - FLUID No growth     Gram Stain Result  Rare WBC's      No organisms seen    Blood culture [[id number]] Collected:  01/28/18 1122    Order Status:  Completed Specimen:  Blood Updated:  02/02/18 1412     Blood Culture, Routine No growth after 5 days.    Fungus culture [[id number]] Collected:  01/31/18 1344    Order Status:  Sent Specimen:  Body Fluid from Pleural Fluid Updated:  01/31/18 1540    Blood culture [[id number]]     Order Status:  Canceled Specimen:  Blood     Blood culture [[id number]]     Order Status:  Canceled Specimen:  Blood           Significant Imaging: I have reviewed all pertinent imaging results/findings within the past 24 hours.

## 2018-02-05 NOTE — PROGRESS NOTES
[facility]  Liver Transplant  Progress Note    Patient Name: Jhonny Diana  MRN: [id number]  Admission Date: 2018  Hospital Length of Stay: 25 days  Code Status: Full Code  Primary Care Provider: Primary Doctor No  Post-Operative Day: 109    ORGAN:   LIVER  Disease Etiology: Acute Alcoholic Hepatitis  Donor Type:    - Brain Death  CDC High Risk:   No  Donor CMV Status:   Donor CMV Status: Positive  Donor HBcAB:   Negative  Donor HCV Status:   Negative  Whole or Partial: Whole Liver  Biliary Anastomosis: End to End  Arterial Anatomy: Standard  Subjective:     History of Present Illness:  Jhonny Diana is a 28 y/o male with past medical history of alcoholic cirrhosis.  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 Found to have peritonitis in November (WBC 5000, 75% PNM) neg for bile leak. He was treated initially with vanc/cefepime. Repeat cell counts  with some improvement (WBC 1400, 45% PNM). He has undergone multiple paracenteses as well as abscess drainage of perihepatic fluid collections and treated with antimicrobial therapy but no positive cultures. Of note, biliary stricture also identified and ERCP performed on 2017 with sphincterotomy and biliary stent placed. Liver tests still have not normalized despite intervention, bilirubin and AP remain elevated. Other pertinent PMH current wound vac in place 2/2 wound infection, malnutrition requiring TPN for short course and ongoing hypoalbuminemia, and seizure activity while on prograf and has since been switched to cyclosporine without reoccurrence.  He presented to the ER for fever, abdominal pain, and N/V. He reports fever (103) for 1  day prior. Overnight, he developed N/V, reports small amount of green emesis with new left sided pain. He also endorses worsening SOB with exertion. He was scheduled as an outpatient for follow up paracentesis and IR drainage of fluid collection. CXR in ER shows large pleural effusion with subsegmental atelectasis. Infectious work up initiated in ER. His ANC is 900. Broad spectrum antibiotics initiated in ED. He denies chest pain, palpitations, diarrhea, constipation, or back pain. Denies any sick contacts.    Hospital Course:  Thoracentesis (1.2L off), Paracentesis (1.6L off), and IR drainage of fluid collection 1/11, all fluids negative for infection. ID consulted. Broad spectrum antibiotics d/c'd 1/15. Chronically malnourished with poor PO intake, prealbumin 7. Shane tube placed 1/15 for tube feedings.     ERCP 1/17 with sludge and a biliary stone which was removed and stent exchanged.  Remained with n/v; therefore, TF remained on hold and TPN continued.   Pt with fever s/p ERCP on 1/17 which continued until 1/19.  Vanc/cefepime restarted.  Blood cx 1/17 and 1/18 NGTD.  ID reconsulted.  Fungal markers sent.  CT C/A/P obtained.  With large R pleural effusion and ascites- both drained 1/19 and negative for infx per cell count. Pt afebrile 1/20. Liver biopsy 1/23 - without rejection.  EGD 1/25 - unremarkable for source of GI symptoms.  Resume diet along with other GI recommendations. SHANE tube placed for tube feeds.       Interval History:  No acute events overnight. H&H with moderate response to PRBCs on 2/3.  Has been afebrile for 24 hours now.  Continue current antibiotics and start treatment of CMV.  ID consulted - appreciate recs. If no improvement in fevers, possible ex lap to find source of infection.  ERCP 1/30 revealed stones and sludge. Stents placed. Tbili slowly improving, will watch trend. Pt still with generalized abdominal pain although not worsening. Thoracentesis 1/31 with 1.4L removed - fluid negative  for infection.  Ct scan reviewed.  Sent aspergillus, resp viral panel, and fungitel - all negative.    Scheduled Meds:   albuterol-ipratropium 2.5mg-0.5mg/3mL  3 mL Nebulization Q4H WAKE    atovaquone  1,500 mg Oral Daily    cycloSPORINE  175 mg Oral BID    docusate sodium  100 mg Oral BID    epoetin maurisio (PROCRIT) injection  10,000 Units Intravenous Every Mon, Wed, Fri    heparin (porcine)  5,000 Units Subcutaneous Q8H    custom IVPB builder   Intravenous Q24H    levetiracetam oral soln  500 mg Oral BID    levothyroxine  75 mcg Oral Before breakfast    metoclopramide HCl  10 mg Oral QID (AC & HS)    [START ON 2/6/2018] omeprazole  40 mg Oral QAM    custom IVPB builder  2.25 g Intravenous Q12H    polyethylene glycol  17 g Oral BID    psyllium husk (aspartame)  3.4 g Oral BID    ursodiol  300 mg Oral TID    valganciclovir 50 mg/ml  200 mg Oral Every Mon, Wed, Fri     Continuous Infusions:  PRN Meds:sodium chloride, sodium chloride, sodium chloride 0.9%, acetaminophen, bisacodyl, heparin (porcine), omnipaque, ondansetron, ondansetron, oxyCODONE, prochlorperazine, simethicone, sodium chloride 0.9%    Review of Systems   Constitutional: Positive for activity change, appetite change and fatigue. Negative for chills and fever.   HENT: Negative.  Negative for congestion and facial swelling.    Eyes: Negative for pain, discharge and visual disturbance.   Respiratory: Negative for cough, shortness of breath and wheezing.    Cardiovascular: Negative for chest pain and leg swelling.   Gastrointestinal: Positive for abdominal distention, abdominal pain, constipation and nausea. Negative for vomiting.   Genitourinary: Positive for decreased urine volume. Negative for difficulty urinating.        Anuric   Musculoskeletal: Negative for arthralgias, back pain and myalgias.   Skin: Positive for color change and wound. Negative for rash.   Allergic/Immunologic: Positive for immunocompromised state.   Neurological:  Negative for dizziness, seizures, weakness and headaches.   Psychiatric/Behavioral: Positive for decreased concentration and dysphoric mood. Negative for confusion. The patient is not nervous/anxious.    All other systems reviewed and are negative.    Objective:     Vital Signs (Most Recent):  Temp: 99 °F (37.2 °C) (02/05/18 1130)  Pulse: 89 (02/05/18 1130)  Resp: 18 (02/05/18 1130)  BP: 116/73 (02/05/18 1130)  SpO2: 98 % (02/05/18 1130) Vital Signs (24h Range):  Temp:  [98.9 °F (37.2 °C)-99.4 °F (37.4 °C)] 99 °F (37.2 °C)  Pulse:  [] 89  Resp:  [14-18] 18  SpO2:  [96 %-98 %] 98 %  BP: (107-129)/(55-78) 116/73     Weight: 67.7 kg (149 lb 4 oz)  Body mass index is 24.09 kg/m².    Intake/Output - Last 3 Shifts       02/03 0700 - 02/04 0659 02/04 0700 - 02/05 0659 02/05 0700 - 02/06 0659    P.O. 320 360 570    Blood 221.3      Other   600    NG/GT       IV Piggyback 600 850     Total Intake(mL/kg) 1141.3 (17.5) 1210 (17.9) 1170 (17.3)    Urine (mL/kg/hr) 0 (0)  0 (0)    Other   2100 (4)    Stool 0 (0)  0 (0)    Total Output 0   2100    Net +1141.3 +1210 -930           Urine Occurrence 0 x 0 x 0 x    Stool Occurrence 3 x 0 x 1 x    Emesis Occurrence 0 x 0 x           Physical Exam   Constitutional: He is oriented to person, place, and time. He appears well-developed. No distress.   Temporal and distal extremity muscle wasting   HENT:   Head: Normocephalic and atraumatic.   Mouth/Throat: No oropharyngeal exudate.   dylan tube   Eyes: EOM are normal. Pupils are equal, round, and reactive to light. Scleral icterus is present.   Neck: Normal range of motion. Neck supple. No JVD present. No thyromegaly present.   Cardiovascular: Normal rate, regular rhythm, normal heart sounds and intact distal pulses.    No murmur heard.  Pulmonary/Chest: Effort normal. No respiratory distress. He has decreased breath sounds in the right middle field, the right lower field and the left lower field. He has no wheezes. He exhibits no  tenderness.   Diminished to RLL   Abdominal: Soft. Bowel sounds are normal. He exhibits ascites. He exhibits no distension. There is tenderness (mild). There is no rebound and no guarding.   Dressing to chevron.  Wd vac removed 1/12/18  Dependent edema present R flank; surrounding erythema   Musculoskeletal: Normal range of motion. He exhibits edema (2+ LE edema). He exhibits no tenderness.   Neurological: He is alert and oriented to person, place, and time. He has normal reflexes.   Skin: Skin is warm and dry. Capillary refill takes 2 to 3 seconds. He is not diaphoretic. No erythema.   jaundice   Psychiatric: He has a normal mood and affect. His behavior is normal. Judgment and thought content normal. His mood appears not anxious.   Depressed mood   Nursing note and vitals reviewed.      Laboratory:  Immunosuppressants         Stop Route Frequency     cycloSPORINE (NEORAL) 100 mg/mL microemulsion solution 175 mg      -- Oral 2 times daily        CBC:     Recent Labs  Lab 02/05/18  0500 02/05/18  0853   WBC 2.98*  --    RBC 2.18*  --    HGB 6.7* 7.1*   HCT 20.6* 21.8*   *  --    MCV 95  --    MCH 30.7  --    MCHC 32.5  --      CMP:     Recent Labs  Lab 02/05/18  0500   GLU 73   CALCIUM 9.5   ALBUMIN 1.7*   PROT 5.3*   *   K 4.0   CO2 22*   CL 98   BUN 45*   CREATININE 5.5*   ALKPHOS 99   ALT 5*   AST 16   BILITOT 2.5*     Coagulation: No results for input(s): PT, INR, APTT in the last 168 hours.  Labs within the past 24 hours have been reviewed.    Diagnostic Results:  None    Assessment/Plan:     Fever    - Pt afebrile now overnight.   - Started on vanc/cefepime for ERCP. Now transitioned to vanc/zosyn   - Blood cultures sent, prelim NGTD.   - thoracentesis 2/1 - fluid negative for infection  - atelectasis noted on ct scan - start breathing tx & CPT  - now cmv positive - started treatment 2/5/18          Severe protein-calorie malnutrition    - Poor PO intake since transplant requiring short course of  TPN during previous hospital stay.   - albumin remains decreased but appetite slowly improving per pt.   - Dietary consulted. Will need to closely monitor PO intake.   - supplements also ordered.   - SHANE with tube feeding start 1/16. Stopped 1/17/18, was not tolerating. [place] removed 1/20.  - prealbumin 7 on 1/15.  - TPN started 1/16/18.   - Pt able to eat minimally 1/21 and 1/22 which is an improvement.  - gi consulted - EGD reviewed, increased ppi bid, miralax bid, and metamucil bid, decreased narcotics  - continue reglan, placed SHANE tube with tube feeds          Acute renal failure with tubular necrosis    - HD resumed on previous admission. Now anuric and dialyzes M-W-F.  - Nephrology following.    - HD Sat since held Friday for thora/para.  - HD transitioned to TTS schedule.  - ktm consulted for possible kidney transplant         Other cytomegaloviral diseases    - detected on last CMV PCR.   - case discussed with ID and will hold off on treatment at this time given low WBC.         Liver transplanted    - Post op course complicated by fevers and hyperbilirubinemia.  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  - AST/ALT normal. Tbili/alkphos remain elevated.  - Liver US 1/8 showed 3.9 cm complex fluid collection anterior to right lobe and moderate nonspecific complex ascites inferior to transplant.   - IR placed drain 1/11/18, cell count negative for infection.  - PBS consulted. PBS not comfortable proceeding with EUS with liver biopsy given fever and possible infectious process going on. ERCP 1/17 with stone and sludge. Pt placed on Actigall - treated with 10 day course of abx.  ID now signed off.  - Bili elevated - liver u/s 1/28  - Liver biopsy 1/23 without rejection.  - consulted PBS for ERCP - Choledocholithiasis was found along with sludge, stents placed 1/30  - T bili now improving.             Constipation    - Enema ordered 1/20 and 1/21 with BM  - Encourage ambulation.  - decrease  narcotics  - d/c metamucil and miralax bid as makes patient nauseated  - increase reglan qid            Long-term use of immunosuppressant medication    - Maintenance IS with cyclosporine. MMF on hold for infections and neutropenia. Continue to check cyclosporine level daily. Assess for toxicity and adjust level as needed.        Prophylactic immunotherapy    - See long term use of immunosuppression.         Anemia of chronic disease    - Continue daily CBC.  - H&H trending down and will plan to transfuse one unit of PRBCs today for replacement.   - Previously transfused 1/30, continue procrit        Recurrent pleural effusion on right    - CXR in ER with large right pleural effusion with subjective c/o worsening SOB  - thoracentesis completed- 1200 ml removed.  SOB with significant improvement.   - Cell count reviewed and negative for infection.   - resp even and non labored.  O2 sat 94-98%.  - Repeat thora 1/19 negative for infection per cell count  - Reaccumulating fluid on xray 1/21.  - thoracentesis 1/31 - 1.4L removed, negative for infection        Biliary stricture of transplanted liver    - tbili with increase prompting ERCP 1/30 with stones and sludge, stents placed  - T bili now improving. Trend daily.         At risk for opportunistic infections    - Hold Valcyte as WBC low.  CMV PCR 1/11, 1/18 undetected.  - bactrim held.          Delayed surgical wound healing    - wd vac removed 1/12/18.  Wound healing well. Aquacel AG and Mepilex border dressing applied via wound care recs.           Other ascites    - Paracentesis performed 1/11/18 with 1600 ml removed.    - fluid negative for infection.   - Repeat para 1/19 negative for infection per cell count  - no fluid seen for paracentesis on 1/23.        Nausea and vomiting    - h/o constipation, reports having regular BM's with bowel regimen  - vomiting episode x 1 at home. Anti-emetics ordered PRN.  - KUB repeated 1/14/18 given increased abdominal pain-  mild, generalized bowel distention suggesting ileus.  Diet changed to clears as tolerated for bowel rest   - SHANE tube placed 1/16 for tube feeds. Not tolerating tube feeds, stopped 1/17/18.  - D/C shane 1/19, now in place as of 1/25.  - chronic constipation, Cont bowel regimen.   - GI consulted. See protein calorie malnutrition              VTE Risk Mitigation         Ordered     heparin (porcine) injection 1,000 Units  As needed (PRN)     Route:  Intra-Catheter        02/05/18 0945     heparin (porcine) injection 5,000 Units  Every 8 hours     Route:  Subcutaneous        01/31/18 0957     Medium Risk of VTE  Once      01/11/18 0351     Place sequential compression device  Until discontinued      01/11/18 0351          The patients clinical status was discussed at multidisplinary rounds, involving transplant surgery, transplant medicine, pharmacy, nursing, nutrition, and social work    Discharge Planning: not candidate at this time  Monitor kidney function --> may need outpt HD  Monitor HH needs vs rehab for deconditioned status      Seda Camacho, NP  Liver Transplant  [facility]

## 2018-02-05 NOTE — ASSESSMENT & PLAN NOTE
- Pt afebrile now overnight.   - Started on vanc/cefepime for ERCP. Now transitioned to vanc/zosyn   - Blood cultures sent, prelim NGTD.   - thoracentesis 2/1 - fluid negative for infection  - atelectasis noted on ct scan - start breathing tx & CPT  - now cmv positive - started treatment 2/5/18

## 2018-02-05 NOTE — SUBJECTIVE & OBJECTIVE
Scheduled Meds:   albuterol-ipratropium 2.5mg-0.5mg/3mL  3 mL Nebulization Q4H WAKE    atovaquone  1,500 mg Oral Daily    cycloSPORINE  175 mg Oral BID    docusate sodium  100 mg Oral BID    epoetin maurisio (PROCRIT) injection  10,000 Units Intravenous Every Mon, Wed, Fri    heparin (porcine)  5,000 Units Subcutaneous Q8H    custom IVPB builder   Intravenous Q24H    levetiracetam oral soln  500 mg Oral BID    levothyroxine  75 mcg Oral Before breakfast    metoclopramide HCl  10 mg Oral QID (AC & HS)    [START ON 2/6/2018] omeprazole  40 mg Oral QAM    custom IVPB builder  2.25 g Intravenous Q12H    polyethylene glycol  17 g Oral BID    psyllium husk (aspartame)  3.4 g Oral BID    ursodiol  300 mg Oral TID    valganciclovir 50 mg/ml  200 mg Oral Every Mon, Wed, Fri     Continuous Infusions:  PRN Meds:sodium chloride, sodium chloride, sodium chloride 0.9%, acetaminophen, bisacodyl, heparin (porcine), omnipaque, ondansetron, ondansetron, oxyCODONE, prochlorperazine, simethicone, sodium chloride 0.9%    Review of Systems   Constitutional: Positive for activity change, appetite change and fatigue. Negative for chills and fever.   HENT: Negative.  Negative for congestion and facial swelling.    Eyes: Negative for pain, discharge and visual disturbance.   Respiratory: Negative for cough, shortness of breath and wheezing.    Cardiovascular: Negative for chest pain and leg swelling.   Gastrointestinal: Positive for abdominal distention, abdominal pain, constipation and nausea. Negative for vomiting.   Genitourinary: Positive for decreased urine volume. Negative for difficulty urinating.        Anuric   Musculoskeletal: Negative for arthralgias, back pain and myalgias.   Skin: Positive for color change and wound. Negative for rash.   Allergic/Immunologic: Positive for immunocompromised state.   Neurological: Negative for dizziness, seizures, weakness and headaches.   Psychiatric/Behavioral: Positive for decreased  concentration and dysphoric mood. Negative for confusion. The patient is not nervous/anxious.    All other systems reviewed and are negative.    Objective:     Vital Signs (Most Recent):  Temp: 99 °F (37.2 °C) (02/05/18 1130)  Pulse: 89 (02/05/18 1130)  Resp: 18 (02/05/18 1130)  BP: 116/73 (02/05/18 1130)  SpO2: 98 % (02/05/18 1130) Vital Signs (24h Range):  Temp:  [98.9 °F (37.2 °C)-99.4 °F (37.4 °C)] 99 °F (37.2 °C)  Pulse:  [] 89  Resp:  [14-18] 18  SpO2:  [96 %-98 %] 98 %  BP: (107-129)/(55-78) 116/73     Weight: 67.7 kg (149 lb 4 oz)  Body mass index is 24.09 kg/m².    Intake/Output - Last 3 Shifts       02/03 0700 - 02/04 0659 02/04 0700 - 02/05 0659 02/05 0700 - 02/06 0659    P.O. 320 360 570    Blood 221.3      Other   600    NG/GT       IV Piggyback 600 850     Total Intake(mL/kg) 1141.3 (17.5) 1210 (17.9) 1170 (17.3)    Urine (mL/kg/hr) 0 (0)  0 (0)    Other   2100 (4)    Stool 0 (0)  0 (0)    Total Output 0   2100    Net +1141.3 +1210 -930           Urine Occurrence 0 x 0 x 0 x    Stool Occurrence 3 x 0 x 1 x    Emesis Occurrence 0 x 0 x           Physical Exam   Constitutional: He is oriented to person, place, and time. He appears well-developed. No distress.   Temporal and distal extremity muscle wasting   HENT:   Head: Normocephalic and atraumatic.   Mouth/Throat: No oropharyngeal exudate.   dylan tube   Eyes: EOM are normal. Pupils are equal, round, and reactive to light. Scleral icterus is present.   Neck: Normal range of motion. Neck supple. No JVD present. No thyromegaly present.   Cardiovascular: Normal rate, regular rhythm, normal heart sounds and intact distal pulses.    No murmur heard.  Pulmonary/Chest: Effort normal. No respiratory distress. He has decreased breath sounds in the right middle field, the right lower field and the left lower field. He has no wheezes. He exhibits no tenderness.   Diminished to RLL   Abdominal: Soft. Bowel sounds are normal. He exhibits ascites. He exhibits no  distension. There is tenderness (mild). There is no rebound and no guarding.   Dressing to chevron.  Wd vac removed 1/12/18  Dependent edema present R flank; surrounding erythema   Musculoskeletal: Normal range of motion. He exhibits edema (2+ LE edema). He exhibits no tenderness.   Neurological: He is alert and oriented to person, place, and time. He has normal reflexes.   Skin: Skin is warm and dry. Capillary refill takes 2 to 3 seconds. He is not diaphoretic. No erythema.   jaundice   Psychiatric: He has a normal mood and affect. His behavior is normal. Judgment and thought content normal. His mood appears not anxious.   Depressed mood   Nursing note and vitals reviewed.      Laboratory:  Immunosuppressants         Stop Route Frequency     cycloSPORINE (NEORAL) 100 mg/mL microemulsion solution 175 mg      -- Oral 2 times daily        CBC:     Recent Labs  Lab 02/05/18  0500 02/05/18  0853   WBC 2.98*  --    RBC 2.18*  --    HGB 6.7* 7.1*   HCT 20.6* 21.8*   *  --    MCV 95  --    MCH 30.7  --    MCHC 32.5  --      CMP:     Recent Labs  Lab 02/05/18  0500   GLU 73   CALCIUM 9.5   ALBUMIN 1.7*   PROT 5.3*   *   K 4.0   CO2 22*   CL 98   BUN 45*   CREATININE 5.5*   ALKPHOS 99   ALT 5*   AST 16   BILITOT 2.5*     Coagulation: No results for input(s): PT, INR, APTT in the last 168 hours.  Labs within the past 24 hours have been reviewed.    Diagnostic Results:  None

## 2018-02-05 NOTE — PROGRESS NOTES
[facility]  Infectious Disease  Progress Note    Patient Name: Jhonny Diana  MRN: [id number]  Admission Date: 1/11/2018  Length of Stay: 25 days  Attending Physician: Nathanael Medina MD  Primary Care Provider: Primary Doctor No    Isolation Status: No active isolations  Assessment/Plan:      Fever    27-year-old male with a history of alcoholic cirrhosis s/p DDLT 10/19/2017, CMV D+/R+, steroid induction, on cyclosporine c/b multiple hospitalizations for culture negative peritonitis, now with recurrent fevers and persistent abdominal pain. Recurrent fever started in setting of recent ERCP concerning for bacteremia associated with procedure. However, patient with persistent abdominal pain and imaging notable for multiple loculated fluid collections doing well s/p ERCP now again with low grade fever of 100.8 and mild CMV reactivation.  - continue valganciclovir for CMV reactivation  - continue zosyn for now            Anticipated Disposition: pending    Thank you for your consult. I will follow-up with patient. Please contact us if you have any additional questions.    Rk Che MD  Infectious Disease Fellow, PGY-5  Spectra: [phone]  Pager: [phone]  [facility]    Subjective:     Principal Problem:Fever    HPI: No notes on file  Interval History: fever curve trending down; otherwise no change    Review of Systems   Constitutional: Negative for chills, fatigue and fever.   HENT: Negative for sore throat.    Respiratory: Negative for cough, chest tightness and shortness of breath.    Cardiovascular: Negative for chest pain, palpitations and leg swelling.   Gastrointestinal: Positive for abdominal pain (improving). Negative for abdominal distention, diarrhea, nausea and vomiting.   Genitourinary: Negative for dysuria, flank pain and urgency.   Skin: Negative for rash.   Neurological: Negative for dizziness, light-headedness and numbness.   Psychiatric/Behavioral: Negative for  confusion.     Objective:     Vital Signs (Most Recent):  Temp: 99.4 °F (37.4 °C) (02/05/18 0820)  Pulse: 88 (02/05/18 1015)  Resp: 18 (02/05/18 1015)  BP: 128/77 (02/05/18 1015)  SpO2: 97 % (02/05/18 0820) Vital Signs (24h Range):  Temp:  [98.5 °F (36.9 °C)-99.4 °F (37.4 °C)] 99.4 °F (37.4 °C)  Pulse:  [] 88  Resp:  [14-18] 18  SpO2:  [96 %-98 %] 97 %  BP: (107-129)/(59-78) 128/77     Weight: 67.7 kg (149 lb 4 oz)  Body mass index is 24.09 kg/m².    Estimated Creatinine Clearance: 18 mL/min (based on SCr of 5.5 mg/dL (H)).    Physical Exam   Constitutional: He is oriented to person, place, and time.   HENT:   Mouth/Throat: No oropharyngeal exudate.   Eyes: No scleral icterus.   Cardiovascular: Normal rate and regular rhythm.    Pulmonary/Chest: Effort normal and breath sounds normal. No respiratory distress. He has no wheezes. He has no rales.   Abdominal: Soft. There is no tenderness. There is no rebound.   Surgical incision wrapped   Musculoskeletal: He exhibits edema.   Lymphadenopathy:     He has no cervical adenopathy.   Neurological: He is alert and oriented to person, place, and time. No cranial nerve deficit.   Skin: He is not diaphoretic.       Significant Labs:   Bilirubin:   Recent Labs  Lab 01/12/18  0627  02/01/18  0445 02/02/18  0436 02/03/18  0530 02/04/18  0500 02/05/18  0500   BILIDIR 2.0*  --   --   --   --   --   --    BILITOT 2.7*  < > 4.0* 3.3* 2.8* 2.8* 2.5*   < > = values in this interval not displayed.  Blood Culture:     Recent Labs  Lab 01/17/18  1651 01/18/18  1922 01/18/18  1927 01/28/18  1122 01/30/18  1916   LABBLOO No growth after 5 days. No growth after 5 days. No growth after 5 days. No growth after 5 days. No growth after 5 days.  No growth after 5 days.     BMP:     Recent Labs  Lab 02/05/18  0500   GLU 73   *   K 4.0   CL 98   CO2 22*   BUN 45*   CREATININE 5.5*   CALCIUM 9.5   MG 1.7     CBC:     Recent Labs  Lab 02/04/18  0500 02/05/18  0500 02/05/18  0853   WBC  3.03* 2.98*  --    HGB 7.1* 6.7* 7.1*   HCT 21.7* 20.6* 21.8*   * 133*  --      CMP:     Recent Labs  Lab 02/04/18  0500 02/05/18  0500   * 133*   K 4.0 4.0   CL 99 98   CO2 25 22*   GLU 79 73   BUN 39* 45*   CREATININE 4.7* 5.5*   CALCIUM 9.5 9.5   PROT 5.6* 5.3*   ALBUMIN 1.8* 1.7*   BILITOT 2.8* 2.5*   ALKPHOS 115 99   AST 17 16   ALT 6* 5*   ANIONGAP 11 13   EGFRNONAA 15.7* 13.0*     Microbiology Results (last 7 days)     Procedure Component Value Units Date/Time    Culture, Anaerobic [[id number]] Collected:  01/31/18 1344    Order Status:  Completed Specimen:  Body Fluid from Pleural Fluid Updated:  02/05/18 0954     Anaerobic Culture Culture in progress    Respiratory Viral Panel by PCR [facility]; Nasal Swab [[id number]] Collected:  02/01/18 1552    Order Status:  Completed Specimen:  Respiratory Updated:  02/05/18 0514     Respiratory Virus Panel, source Nasal Swab     RVP - Adenovirus Not Detected     Comment: Detects Serotypes B and E. Detection of Serotype C may   be limited. If Adenovirus infection is suspected and a   Not Detected result is returned the sample should be   re-tested for Adenovirus using an independent method  (e.g. [employer] Adenovirus Quantitative Real-Time  PCR test.          Enterovirus Not Detected     Comment: Cross-reactivity has been observed between certain Rhinovirus  strains and the Enterovirus assay.          Human Bocavirus Not Detected     Human Coronavirus Not Detected     Comment: The Human Coronavirus assay detects Human coronavirus types  229E, OC43,NL63 and HKU1.          RVP - Human Metapneumovirus (hMPV) Not Detected     RVP - Influenza A Not Detected     Influenza A - F0O1-92 Not Detected     RVP - Influenza B Not Detected     Parainfluenza Not Detected     Respiratory Syncytial VirusVirus (RSV) A Not Detected     Comment: The Respiratory Syncytial Viral assay detects types A and B,  however it does not distinguish between the two.          RVP -  Rhinovirus Not Detected     Comment: Cross-Reactivity has been observed between certain   Rhinovirus strains and the Enterovirus assay.  Target Enriched Mulitplex Polymerase Chain Reaction (TEM-PCR)  allows for the detection of multiple pathogens out of a single  reaction.  This test was developed and its performance   characteristics determined by [employer].  It has not   been cleared or approved by the U.S.Food and Drug Administration.  Results should be used in conjunction with clinical findings,   and should not form the sole basis for a diagnosis or treatment  decision.  TEM-PCR is a licensed technology of [employer].         Narrative:       Receiving Lab:->[facility]    Blood culture [[id number]] Collected:  01/30/18 1916    Order Status:  Completed Specimen:  Blood Updated:  02/04/18 2212     Blood Culture, Routine No growth after 5 days.    Blood culture [[id number]] Collected:  01/30/18 1916    Order Status:  Completed Specimen:  Blood Updated:  02/04/18 2212     Blood Culture, Routine No growth after 5 days.    Culture, Body Fluid (Aerobic) w/ GS [[id number]] Collected:  01/31/18 1344    Order Status:  Completed Specimen:  Body Fluid from Pleural Fluid Updated:  02/04/18 0800     AEROBIC CULTURE - FLUID No growth     Gram Stain Result Rare WBC's      No organisms seen    Blood culture [[id number]] Collected:  01/28/18 1122    Order Status:  Completed Specimen:  Blood Updated:  02/02/18 1412     Blood Culture, Routine No growth after 5 days.    Fungus culture [[id number]] Collected:  01/31/18 1344    Order Status:  Sent Specimen:  Body Fluid from Pleural Fluid Updated:  01/31/18 1540    Blood culture [[id number]]     Order Status:  Canceled Specimen:  Blood     Blood culture [[id number]]     Order Status:  Canceled Specimen:  Blood           Significant Imaging: I have reviewed all pertinent imaging results/findings within the past 24 hours.

## 2018-02-05 NOTE — NURSING
Brown bomb enema administered. Pt immediately had a soft, moderate-sized bowel movement. Pt states that he feels relief in his lower back and abdomen.   Melonie

## 2018-02-05 NOTE — PROGRESS NOTES
Dialysis completed. Right IJ tunneled catheter flushed with normal saline, heparinized, capped and taped. Patient dialyzed for 3 hours with fluid removal of 1.5 liters. Tolerated well with stable vital signs.

## 2018-02-05 NOTE — TREATMENT PLAN
"Treatment Plan  02/04/2018  9:19 PM    Visited with patient and mother this afternoon. Patient states that today is the best day he has had in a while. He denies any nausea or emesis with TF at 45 cc. He did report lower back pain and was using a heating pad to provide some comfort. Lastly he reports on 1 BM on 1/30, 1 BM on 1/31, and 1 BM yesterday.    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 Overall he is a complex case as he has multiple issues contributing to his nausea and emesis.   -Continue TF if able to tolerate PO should be encouraged to have small/frequent low fat meals  -Minimize pain medications (currently getting Oxycodone every 4 hours)  -Continue Reglan--would recommend repeat EKG to evaluate for prolonged Qtc as he has now been on it since 1/26 and has now been increased in dose, last EGD on 1/11/18 had a Qtc of 464  -Continue bowel regimen in order to facilitate at least 1 BM per day, he states that he cannot tolerate Miralax and we might therefore have to rely on enema/suppositories    We thank you for this consultation, we will sign off at this time.    Divya Booker M.D.  Gastroenterology Fellow, PGY-IV  Pager: [phone]  [facility]    "

## 2018-02-05 NOTE — PLAN OF CARE
AAO x 4. VSS, afebrile, SpO2>95% on RA. R manasa linton. NPO at MN for possible procedure. No complaints of nausea. PRN Oxycodone administered when available for pain control. Fall precautions maintained and pt repositions self. See flowsheet for assessment findings. Will continue to monitor.

## 2018-02-06 ENCOUNTER — ANESTHESIA EVENT (OUTPATIENT)
Dept: SURGERY | Facility: HOSPITAL | Age: 28
DRG: 628 | End: 2018-02-06
Payer: COMMERCIAL

## 2018-02-06 LAB
ALBUMIN SERPL BCP-MCNC: 1.8 G/DL
ALP SERPL-CCNC: 105 U/L
ALT SERPL W/O P-5'-P-CCNC: 5 U/L
ANION GAP SERPL CALC-SCNC: 11 MMOL/L
AST SERPL-CCNC: 21 U/L
BACTERIA FLD AEROBE CULT: NO GROWTH
BASOPHILS # BLD AUTO: 0.02 K/UL
BASOPHILS NFR BLD: 0.7 %
BILIRUB SERPL-MCNC: 2.3 MG/DL
BLD PROD TYP BPU: NORMAL
BLOOD UNIT EXPIRATION DATE: NORMAL
BLOOD UNIT TYPE CODE: 5100
BLOOD UNIT TYPE: NORMAL
BUN SERPL-MCNC: 23 MG/DL
CALCIUM SERPL-MCNC: 9.1 MG/DL
CHLORIDE SERPL-SCNC: 102 MMOL/L
CO2 SERPL-SCNC: 23 MMOL/L
CODING SYSTEM: NORMAL
CREAT SERPL-MCNC: 3.7 MG/DL
CYCLOSPORINE BLD LC/MS/MS-MCNC: 175 NG/ML
DIFFERENTIAL METHOD: ABNORMAL
DISPENSE STATUS: NORMAL
EOSINOPHIL # BLD AUTO: 0.3 K/UL
EOSINOPHIL NFR BLD: 8.5 %
ERYTHROCYTE [DISTWIDTH] IN BLOOD BY AUTOMATED COUNT: 15.1 %
EST. GFR  (AFRICAN AMERICAN): 24.2 ML/MIN/1.73 M^2
EST. GFR  (NON AFRICAN AMERICAN): 21 ML/MIN/1.73 M^2
GLUCOSE SERPL-MCNC: 91 MG/DL
GRAM STN SPEC: NORMAL
GRAM STN SPEC: NORMAL
HCT VFR BLD AUTO: 21.2 %
HGB BLD-MCNC: 6.8 G/DL
IMM GRANULOCYTES # BLD AUTO: 0.05 K/UL
IMM GRANULOCYTES NFR BLD AUTO: 1.7 %
LYMPHOCYTES # BLD AUTO: 0.7 K/UL
LYMPHOCYTES NFR BLD: 25.3 %
MAGNESIUM SERPL-MCNC: 1.9 MG/DL
MCH RBC QN AUTO: 31.5 PG
MCHC RBC AUTO-ENTMCNC: 32.1 G/DL
MCV RBC AUTO: 98 FL
MONOCYTES # BLD AUTO: 0.5 K/UL
MONOCYTES NFR BLD: 16.7 %
NEUTROPHILS # BLD AUTO: 1.4 K/UL
NEUTROPHILS NFR BLD: 47.1 %
NRBC BLD-RTO: 0 /100 WBC
PHOSPHATE SERPL-MCNC: 3.1 MG/DL
PLATELET # BLD AUTO: 156 K/UL
PMV BLD AUTO: 11.6 FL
POTASSIUM SERPL-SCNC: 3.9 MMOL/L
PROT SERPL-MCNC: 5.5 G/DL
RBC # BLD AUTO: 2.16 M/UL
SODIUM SERPL-SCNC: 136 MMOL/L
TRANS ERYTHROCYTES VOL PATIENT: NORMAL ML
WBC # BLD AUTO: 2.93 K/UL

## 2018-02-06 PROCEDURE — 63600175 PHARM REV CODE 636 W HCPCS: Mod: JG | Performed by: SURGERY

## 2018-02-06 PROCEDURE — 99233 SBSQ HOSP IP/OBS HIGH 50: CPT | Mod: ,,, | Performed by: INTERNAL MEDICINE

## 2018-02-06 PROCEDURE — 36569 INSJ PICC 5 YR+ W/O IMAGING: CPT

## 2018-02-06 PROCEDURE — 80053 COMPREHEN METABOLIC PANEL: CPT

## 2018-02-06 PROCEDURE — P9021 RED BLOOD CELLS UNIT: HCPCS

## 2018-02-06 PROCEDURE — 80158 DRUG ASSAY CYCLOSPORINE: CPT

## 2018-02-06 PROCEDURE — 63600175 PHARM REV CODE 636 W HCPCS: Performed by: NURSE PRACTITIONER

## 2018-02-06 PROCEDURE — C1751 CATH, INF, PER/CENT/MIDLINE: HCPCS

## 2018-02-06 PROCEDURE — 25000003 PHARM REV CODE 250: Performed by: STUDENT IN AN ORGANIZED HEALTH CARE EDUCATION/TRAINING PROGRAM

## 2018-02-06 PROCEDURE — 94664 DEMO&/EVAL PT USE INHALER: CPT

## 2018-02-06 PROCEDURE — 99239 HOSP IP/OBS DSCHRG MGMT >30: CPT | Mod: ,,, | Performed by: NURSE PRACTITIONER

## 2018-02-06 PROCEDURE — 83735 ASSAY OF MAGNESIUM: CPT

## 2018-02-06 PROCEDURE — 94640 AIRWAY INHALATION TREATMENT: CPT

## 2018-02-06 PROCEDURE — 97803 MED NUTRITION INDIV SUBSEQ: CPT | Performed by: DIETITIAN, REGISTERED

## 2018-02-06 PROCEDURE — 36430 TRANSFUSION BLD/BLD COMPNT: CPT

## 2018-02-06 PROCEDURE — 25000003 PHARM REV CODE 250: Performed by: NURSE PRACTITIONER

## 2018-02-06 PROCEDURE — 25000242 PHARM REV CODE 250 ALT 637 W/ HCPCS: Performed by: NURSE PRACTITIONER

## 2018-02-06 PROCEDURE — 85025 COMPLETE CBC W/AUTO DIFF WBC: CPT

## 2018-02-06 PROCEDURE — 94761 N-INVAS EAR/PLS OXIMETRY MLT: CPT

## 2018-02-06 PROCEDURE — 99900035 HC TECH TIME PER 15 MIN (STAT)

## 2018-02-06 PROCEDURE — 87070 CULTURE OTHR SPECIMN AEROBIC: CPT

## 2018-02-06 PROCEDURE — 20600001 HC STEP DOWN PRIVATE ROOM

## 2018-02-06 PROCEDURE — 25000003 PHARM REV CODE 250: Performed by: SURGERY

## 2018-02-06 PROCEDURE — 63600175 PHARM REV CODE 636 W HCPCS: Performed by: PHYSICIAN ASSISTANT

## 2018-02-06 PROCEDURE — 84100 ASSAY OF PHOSPHORUS: CPT

## 2018-02-06 PROCEDURE — 76937 US GUIDE VASCULAR ACCESS: CPT

## 2018-02-06 RX ORDER — HYDROCODONE BITARTRATE AND ACETAMINOPHEN 500; 5 MG/1; MG/1
TABLET ORAL
Status: DISCONTINUED | OUTPATIENT
Start: 2018-02-06 | End: 2018-02-10

## 2018-02-06 RX ADMIN — BISACODYL 10 MG: 10 SUPPOSITORY RECTAL at 06:02

## 2018-02-06 RX ADMIN — SODIUM CHLORIDE: 9 INJECTION, SOLUTION INTRAVENOUS at 09:02

## 2018-02-06 RX ADMIN — OXYCODONE HYDROCHLORIDE 5 MG: 5 TABLET ORAL at 06:02

## 2018-02-06 RX ADMIN — METOCLOPRAMIDE HYDROCHLORIDE 10 MG: 5 SOLUTION ORAL at 11:02

## 2018-02-06 RX ADMIN — URSODIOL 300 MG: 300 CAPSULE ORAL at 03:02

## 2018-02-06 RX ADMIN — LEVOTHYROXINE SODIUM 75 MCG: 75 TABLET ORAL at 05:02

## 2018-02-06 RX ADMIN — URSODIOL 300 MG: 300 CAPSULE ORAL at 09:02

## 2018-02-06 RX ADMIN — LEVETIRACETAM 500 MG: 100 SOLUTION ORAL at 08:02

## 2018-02-06 RX ADMIN — METOCLOPRAMIDE HYDROCHLORIDE 10 MG: 5 SOLUTION ORAL at 09:02

## 2018-02-06 RX ADMIN — IPRATROPIUM BROMIDE AND ALBUTEROL SULFATE 3 ML: .5; 3 SOLUTION RESPIRATORY (INHALATION) at 09:02

## 2018-02-06 RX ADMIN — LEVETIRACETAM 500 MG: 100 SOLUTION ORAL at 09:02

## 2018-02-06 RX ADMIN — PIPERACILLIN AND TAZOBACTAM 2.25 G: 4; .5 INJECTION, POWDER, LYOPHILIZED, FOR SOLUTION INTRAVENOUS; PARENTERAL at 09:02

## 2018-02-06 RX ADMIN — OMEPRAZOLE 40 MG: 40 CAPSULE, DELAYED RELEASE ORAL at 05:02

## 2018-02-06 RX ADMIN — HEPARIN SODIUM 5000 UNITS: 5000 INJECTION, SOLUTION INTRAVENOUS; SUBCUTANEOUS at 09:02

## 2018-02-06 RX ADMIN — IPRATROPIUM BROMIDE AND ALBUTEROL SULFATE 3 ML: .5; 3 SOLUTION RESPIRATORY (INHALATION) at 03:02

## 2018-02-06 RX ADMIN — ATOVAQUONE 1500 MG: 750 SUSPENSION ORAL at 08:02

## 2018-02-06 RX ADMIN — HEPARIN SODIUM 5000 UNITS: 5000 INJECTION, SOLUTION INTRAVENOUS; SUBCUTANEOUS at 03:02

## 2018-02-06 RX ADMIN — PIPERACILLIN AND TAZOBACTAM 2.25 G: 4; .5 INJECTION, POWDER, LYOPHILIZED, FOR SOLUTION INTRAVENOUS; PARENTERAL at 10:02

## 2018-02-06 RX ADMIN — METOCLOPRAMIDE HYDROCHLORIDE 10 MG: 5 SOLUTION ORAL at 05:02

## 2018-02-06 RX ADMIN — CYCLOSPORINE 175 MG: 100 SOLUTION ORAL at 08:02

## 2018-02-06 RX ADMIN — URSODIOL 300 MG: 300 CAPSULE ORAL at 05:02

## 2018-02-06 RX ADMIN — OXYCODONE HYDROCHLORIDE 5 MG: 5 TABLET ORAL at 11:02

## 2018-02-06 RX ADMIN — METOCLOPRAMIDE HYDROCHLORIDE 10 MG: 5 SOLUTION ORAL at 06:02

## 2018-02-06 RX ADMIN — OXYCODONE HYDROCHLORIDE 5 MG: 5 TABLET ORAL at 07:02

## 2018-02-06 RX ADMIN — CYCLOSPORINE 175 MG: 100 SOLUTION ORAL at 06:02

## 2018-02-06 NOTE — PROGRESS NOTES
[facility]  Adult Nutrition  Progress Note    SUMMARY     Recommendations  Recommendation/Intervention: RD continues to endorse placement of PEJ tube for nocturnal feedings once medical condition improves. Recommend noctural feeds via NG if PEJ not to be placed. If noctural feeds started, Novasource Renal at 70mL/hr from 8pm - 8am would meet 75% of EEN and EPN. This provides 1680kcal, 76g protein, and 602mL free fluid.     If patient cannot tolerate increased rate overnight, continue current TF regimen of Novasource Renal at 45mL/hr x24 hours encouraging patient to not turn off TF throughout the day. Flushes per MD. Hold for residuals >500mL.     Continue regular diet, encouraging intake of meals and snacks. RD following.      Goals: Consume/tolerate > 85% EEN and EPN  Nutrition Goal Status: progressing towards goal  Communication of RD Recs: reviewed with physician    Reason for Assessment  Reason for Assessment: RD follow-up  Diagnosis: transplant/postoperative complications  Relevent Medical History: OLTx 10/19/2017, complicate post-op w/ LAURA ongoing, chronic malnutrition, cirrhosis 2' EtOH   Interdisciplinary Rounds: attended     General Information Comments: TF running at goal rate of 45mL/hr intermitently. Pt is turning TF off for several hours at a time because he reports that he finds it difficult to eat PO while it is running. Difficult to determine true amount of TF intake.  Pt also refuses to eat hospital food and is eating <25% of outside food. Educated pt on importance of TF and calorie and protein requirements.     Nutrition Discharge Planning: Adequate nutrition PO vs EN    Nutrition Prescription Ordered  Current Diet Order: Regular  Current Nutrition Support Formula Ordered: Novasource Renal  Current Nutrition Support Rate Ordered: 45 (ml)  Current Nutrition Support Frequency Ordered: mL/hr      Evaluation of Received Nutrients/Fluid Intake  Enteral Calories (kcal): 2160  Enteral  Protein (gm): 98  Enteral (Free Water) Fluid (mL): 774  Total Calories (kcal): 2160  Energy Calories Required: meeting needs  % Kcal Needs: 100  Protein Required: meeting needs  % Protein Needs: 100  GIR (Glucose Infusion Rate) (mg/kg/min): 0 mg/kg/min  Lipid Calories (kcals): 0 kcals  I/O: -1.8L since admit, no uop  Fluid Required:  (per MD)  Comments: tolerating tube feeds at goal rate  Tolerance: tolerating  % Intake of Estimated Energy Needs:  %  % Meal Intake: 25%     Nutrition Risk Screen  Nutrition Risk Screen: large or nonhealing wound, burn or pressure ulcer    Nutrition/Diet History  Patient Reported Diet/Restrictions/Preferences: low salt  Typical Food/Fluid Intake: eats very little at this time  Food Preferences: No cultural or Advent food preferences noted  Meal/Snack Patterns: pt likes to cook, when feeling well will cook and then only eat bites  Supplemental Drinks or Food Habits:  (protein powders sometimes)  Factors Affecting Nutritional Intake: altered gastrointestinal function, abdominal distention, decreased appetite, nausea/vomiting    Labs/Tests/Procedures/Meds  Pertinent Labs Reviewed: reviewed, pertinent  Pertinent Labs Comments: K 3.9, Cr 3.7, gfr 21.0, P 3.1, PAB 11, TBili 2.3  Pertinent Medications Reviewed: reviewed, pertinent  Pertinent Medications Comments: omeprazole, psyllium, docusate, heparin, metoclopramide  Scheduled Meds:   albuterol-ipratropium 2.5mg-0.5mg/3mL  3 mL Nebulization Q4H WAKE    atovaquone  1,500 mg Oral Daily    cycloSPORINE  175 mg Oral BID    docusate sodium  100 mg Oral BID    epoetin maurisio (PROCRIT) injection  10,000 Units Intravenous Every Mon, Wed, Fri    heparin (porcine)  5,000 Units Subcutaneous Q8H    custom IVPB builder   Intravenous Q24H    levetiracetam oral soln  500 mg Oral BID    levothyroxine  75 mcg Oral Before breakfast    metoclopramide HCl  10 mg Oral QID (AC & HS)    omeprazole  40 mg Oral QAM    custom IVPB builder  2.25 g  "Intravenous Q12H    polyethylene glycol  17 g Oral BID    psyllium husk (aspartame)  3.4 g Oral BID    ursodiol  300 mg Oral TID    valganciclovir 50 mg/ml  200 mg Oral Every Mon, Wed, Fri     Physical Findings  Overall Physical Appearance: generalized wasting, loss of muscle mass, loss of subcutaneous fat, weak  Tubes: nasoduoduenal tube  Oral/Mouth Cavity: WDL  Skin: intact    Anthropometrics  Temp: 99.4 °F (37.4 °C)  Height: 5' 6" (167.6 cm)  Weight Method: Bed Scale  Weight: 68.3 kg (150 lb 9.2 oz)  Ideal Body Weight (IBW), Male: 142 lb  % Ideal Body Weight, Male (lb): 112.68 lb  BMI (Calculated): 25.9  BMI Grade: 25 - 29.9 - overweight  Weight Loss: unintentional  Usual Body Weight (UBW), k kg (2017 )  % Usual Body Weight: 86.71  % Weight Change From Usual Weight: -13.47 %    Estimated/Assessed Needs  Weight Used For Calorie Calculations: 62.3 kg (137 lb 5.6 oz)   Height (cm): 167.6 cm  Energy Calorie Requirements (kcal): 9239-2223 (30-35 kcal/kg)  Energy Need Method: Kcal/kg  30 kcal/kg (kcal): 1869 and 35 kcal/kg (kcal): 2180.5   RMR ([place]-St. Jeor Equation): 1535.75  Weight Used For Protein Calculations: 62.3 kg (137 lb 5.6 oz)  Protein Requirements: 81-94g (1.3-1.5 g/kg)  1.3 gm Protein (gm): 81.16 and 1.5 gm Protein (gm): 93.65  Fluid Requirements (mL): 1mL/kcal  Fluid Need Method: RDA Method  RDA Method (mL): 1869    Assessment and Plan  Severe protein-calorie malnutrition    Nutrition Diagnosis  Inadequate oral intake    Related to (etiology):   Decreased appetite, abdominal distention    Signs and Symptoms (as evidenced by):   Pt eating 0% of meals and relying on enteral nutrition for 100% of EPN, EEN     Interventions/Recommendations (treatment strategy):  See recs    Nutrition Diagnosis Status:   Improving            Monitor and Evaluation  Food and Nutrient Intake: energy intake, food and beverage intake  Food and Nutrient Adminstration: diet order  Knowledge/Beliefs/Attitudes: food " and nutrition knowledge/skill  Physical Activity and Function: nutrition-related ADLs and IADLs  Anthropometric Measurements: weight, weight change, body mass index  Biochemical Data, Medical Tests and Procedures: electrolyte and renal panel, lipid profile, gastrointestinal profile, glucose/endocrine profile, inflammatory profile  Nutrition-Focused Physical Findings: overall appearance    Nutrition Risk  Level of Risk:  (2x/week)    Nutrition Follow-Up  RD Follow-up?: Yes    I certify that I directed the dietetic intern in service delivery and guided them using my skilled judgment. As the cosigning dietitian, I have reviewed the dietetic interns documentation and am responsible for the treatment, assessment, and plan.    Yaron Fernandes Dietetic Intern

## 2018-02-06 NOTE — ASSESSMENT & PLAN NOTE
- Pt afebrile now overnight.   - Started on vanc/cefepime for ERCP. Now transitioned to vanc/zosyn   - Blood cultures sent, prelim NGTD.   - thoracentesis 2/1 - fluid negative for infection  - atelectasis noted on ct scan - start breathing tx & CPT  - now cmv positive - started treatment 2/5/18  - plan to take to OR for exp lap in am

## 2018-02-06 NOTE — PROGRESS NOTES
Follow up on transverse abdominal wound     02/06/18 1100       Incision/Site 10/30/17 0911 abdomen transverse   Date First Assessed/Time First Assessed: 10/30/17 0911   Present Prior to Hospital Arrival?: Yes  Location: abdomen  Orientation: transverse   Wound Image    Incision WDL ex   Dressing Appearance Dry   Drainage Amount Scant   Drainage Characteristics/Odor Yellow   Appearance Pink;Red;Dry   Red (%), Wound Tissue Color 100 %   Periwound Area Blistered;Other (see comments)   Wound Edges Open   Wound Length (cm) 9.5  (4)   Wound Width (cm) 0.7  (1)   Depth (cm) 0.1  (0.1)   Care Cleansed with:;Sterile normal saline   Dressing Applied;Hydrofiber;Foam     Recommendations:   Continue present tx.  Unsure if patient has been abrading medial wound as the area was healed last week and has reopened.   Redressed .  Zoë Murphy RN CWON  [id number]

## 2018-02-06 NOTE — PHYSICIAN QUERY
PT Name: Jhonny Diana  MR #: [id number]     Physician Query Form - Diagnosis Clarification      CDS/: Judie Sumner               Contact information:[email or web address]    This form is a permanent document in the medical record.     Query Date: February 6, 2018    By submitting this query, we are merely seeking further clarification of documentation.  Please utilize your independent clinical judgment when addressing the question(s) below.     The medical record contains the following:      Findings Supporting Clinical Information Location in Medical Record   Suggesting Ileus Nausea and vomiting     - h/o constipation, reports having regular BM's with bowel regimen   - vomiting episode x 1 at home. Anti-emetics ordered PRN.   - KUB repeated 1/14/18 given increased abdominal pain- mild, generalized bowel distention suggesting ileus.  Diet changed to clears as tolerated for bowel rest   - DYLAN tube placed 1/16 for tube feeds. Not tolerating tube feeds, stopped 1/17/18.   - D/C dylan 1/19, now in place as of 1/25.     Liver Transplant note 2-1     Please clarify if the Suggesting Ileus_ diagnosis has been:    [  ] Ruled In  [  ] Ruled In, Now Resolved  [ x ] Ruled Out  [  ] Clinically insignificant  [  ] Clinically undetermined  [  ] Other/Clarification of findings (please specify)_______________________________    Please document in your progress notes daily for the duration of treatment, until resolved, and include in your discharge summary.

## 2018-02-06 NOTE — PROGRESS NOTES
[facility]  Liver Transplant  Progress Note    Patient Name: Jhonny Diana  MRN: [id number]  Admission Date: 2018  Hospital Length of Stay: 26 days  Code Status: Full Code  Primary Care Provider: Primary Doctor No  Post-Operative Day: 110    ORGAN:   LIVER  Disease Etiology: Acute Alcoholic Hepatitis  Donor Type:    - Brain Death  CDC High Risk:   No  Donor CMV Status:   Donor CMV Status: Positive  Donor HBcAB:   Negative  Donor HCV Status:   Negative  Whole or Partial: Whole Liver  Biliary Anastomosis: End to End  Arterial Anatomy: Standard  Subjective:     History of Present Illness:  Jhonny Diana is a 26 y/o male with past medical history of alcoholic cirrhosis.  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 Found to have peritonitis in November (WBC 5000, 75% PNM) neg for bile leak. He was treated initially with vanc/cefepime. Repeat cell counts  with some improvement (WBC 1400, 45% PNM). He has undergone multiple paracenteses as well as abscess drainage of perihepatic fluid collections and treated with antimicrobial therapy but no positive cultures. Of note, biliary stricture also identified and ERCP performed on 2017 with sphincterotomy and biliary stent placed. Liver tests still have not normalized despite intervention, bilirubin and AP remain elevated. Other pertinent PMH current wound vac in place 2/2 wound infection, malnutrition requiring TPN for short course and ongoing hypoalbuminemia, and seizure activity while on prograf and has since been switched to cyclosporine without reoccurrence.  He presented to the ER for fever, abdominal pain, and N/V. He reports fever (103) for 1  day prior. Overnight, he developed N/V, reports small amount of green emesis with new left sided pain. He also endorses worsening SOB with exertion. He was scheduled as an outpatient for follow up paracentesis and IR drainage of fluid collection. CXR in ER shows large pleural effusion with subsegmental atelectasis. Infectious work up initiated in ER. His ANC is 900. Broad spectrum antibiotics initiated in ED. He denies chest pain, palpitations, diarrhea, constipation, or back pain. Denies any sick contacts.    Hospital Course:  Thoracentesis (1.2L off), Paracentesis (1.6L off), and IR drainage of fluid collection 1/11, all fluids negative for infection. ID consulted. Broad spectrum antibiotics d/c'd 1/15. Chronically malnourished with poor PO intake, prealbumin 7. Shane tube placed 1/15 for tube feedings.     ERCP 1/17 with sludge and a biliary stone which was removed and stent exchanged.  Remained with n/v; therefore, TF remained on hold and TPN continued.   Pt with fever s/p ERCP on 1/17 which continued until 1/19.  Vanc/cefepime restarted.  Blood cx 1/17 and 1/18 NGTD.  ID reconsulted.  Fungal markers sent.  CT C/A/P obtained.  With large R pleural effusion and ascites- both drained 1/19 and negative for infx per cell count. Pt afebrile 1/20. Liver biopsy 1/23 - without rejection.  EGD 1/25 - unremarkable for source of GI symptoms.  Resume diet along with other GI recommendations. SHANE tube placed for tube feeds.       Interval History:  No acute events overnight. H&H with moderate response to PRBCs on 2/3.  Has been afebrile for 24 hours now.  Continue current antibiotics and start treatment of CMV.  ID consulted - appreciate recs. Persistent low grade fevers, plan for ex lap to find source of infection.  ERCP 1/30 revealed stones and sludge. Stents placed. Tbili slowly improving, will watch trend. Pt still with generalized abdominal pain although not worsening. Thoracentesis 1/31 with 1.4L removed - fluid negative  for infection.  Ct scan reviewed.  Sent aspergillus, resp viral panel, and fungitel - all negative.    Scheduled Meds:   albuterol-ipratropium 2.5mg-0.5mg/3mL  3 mL Nebulization Q4H WAKE    atovaquone  1,500 mg Oral Daily    cycloSPORINE  175 mg Oral BID    docusate sodium  100 mg Oral BID    epoetin maurisio (PROCRIT) injection  10,000 Units Intravenous Every Mon, Wed, Fri    heparin (porcine)  5,000 Units Subcutaneous Q8H    custom IVPB builder   Intravenous Q24H    levetiracetam oral soln  500 mg Oral BID    levothyroxine  75 mcg Oral Before breakfast    metoclopramide HCl  10 mg Oral QID (AC & HS)    omeprazole  40 mg Oral QAM    custom IVPB builder  2.25 g Intravenous Q12H    polyethylene glycol  17 g Oral BID    psyllium husk (aspartame)  3.4 g Oral BID    ursodiol  300 mg Oral TID    valganciclovir 50 mg/ml  200 mg Oral Every Mon, Wed, Fri     Continuous Infusions:  PRN Meds:sodium chloride, sodium chloride, sodium chloride, sodium chloride 0.9%, acetaminophen, bisacodyl, heparin (porcine), omnipaque, ondansetron, ondansetron, oxyCODONE, prochlorperazine, simethicone, sodium chloride 0.9%    Review of Systems   Constitutional: Positive for activity change, appetite change and fatigue. Negative for chills and fever.   HENT: Negative.  Negative for congestion and facial swelling.    Eyes: Negative for pain, discharge and visual disturbance.   Respiratory: Negative for cough, shortness of breath and wheezing.    Cardiovascular: Negative for chest pain and leg swelling.   Gastrointestinal: Positive for abdominal distention, abdominal pain, constipation and nausea. Negative for vomiting.   Genitourinary: Positive for decreased urine volume. Negative for difficulty urinating.        Anuric   Musculoskeletal: Negative for arthralgias, back pain and myalgias.   Skin: Positive for color change and wound. Negative for rash.   Allergic/Immunologic: Positive for immunocompromised state.   Neurological:  Negative for dizziness, seizures, weakness and headaches.   Psychiatric/Behavioral: Positive for decreased concentration and dysphoric mood. Negative for confusion. The patient is not nervous/anxious.    All other systems reviewed and are negative.    Objective:     Vital Signs (Most Recent):  Temp: 99.4 °F (37.4 °C) (02/06/18 1056)  Pulse: 107 (02/06/18 1056)  Resp: 18 (02/06/18 1056)  BP: 118/64 (02/06/18 1056)  SpO2: (!) 94 % (02/06/18 1056) Vital Signs (24h Range):  Temp:  [98.6 °F (37 °C)-100.3 °F (37.9 °C)] 99.4 °F (37.4 °C)  Pulse:  [] 107  Resp:  [16-20] 18  SpO2:  [94 %-99 %] 94 %  BP: (113-123)/(60-71) 118/64     Weight: 68.3 kg (150 lb 9.2 oz)  Body mass index is 24.3 kg/m².    Intake/Output - Last 3 Shifts       02/04 0700 - 02/05 0659 02/05 0700 - 02/06 0659 02/06 0700 - 02/07 0659    P.O. 360 990     Blood       Other  600     IV Piggyback 850 350     Total Intake(mL/kg) 1210 (17.9) 1940 (28.4)     Urine (mL/kg/hr)  0 (0)     Other  2100 (1.3)     Stool  0 (0)     Total Output   2100      Net +1210 -160             Urine Occurrence 0 x 0 x     Stool Occurrence 0 x 2 x     Emesis Occurrence 0 x            Physical Exam   Constitutional: He is oriented to person, place, and time. He appears well-developed. No distress.   Temporal and distal extremity muscle wasting   HENT:   Head: Normocephalic and atraumatic.   Mouth/Throat: No oropharyngeal exudate.   dylan tube   Eyes: EOM are normal. Pupils are equal, round, and reactive to light. Scleral icterus is present.   Neck: Normal range of motion. Neck supple. No JVD present. No thyromegaly present.   Cardiovascular: Normal rate, regular rhythm, normal heart sounds and intact distal pulses.    No murmur heard.  Pulmonary/Chest: Effort normal. No respiratory distress. He has decreased breath sounds in the right middle field, the right lower field and the left lower field. He has no wheezes. He exhibits no tenderness.   Diminished to RLL   Abdominal: Soft.  Bowel sounds are normal. He exhibits ascites. He exhibits no distension. There is tenderness (mild). There is no rebound and no guarding.   Dressing to chevron.  Wd vac removed 1/12/18  Dependent edema present R flank; surrounding erythema   Musculoskeletal: Normal range of motion. He exhibits edema (2+ LE edema). He exhibits no tenderness.   Neurological: He is alert and oriented to person, place, and time. He has normal reflexes.   Skin: Skin is warm and dry. Capillary refill takes 2 to 3 seconds. He is not diaphoretic. No erythema.   jaundice   Psychiatric: He has a normal mood and affect. His behavior is normal. Judgment and thought content normal. His mood appears not anxious.   Depressed mood   Nursing note and vitals reviewed.      Laboratory:  Immunosuppressants         Stop Route Frequency     cycloSPORINE (NEORAL) 100 mg/mL microemulsion solution 175 mg      -- Oral 2 times daily        CBC:     Recent Labs  Lab 02/06/18  0530   WBC 2.93*   RBC 2.16*   HGB 6.8*   HCT 21.2*      MCV 98   MCH 31.5*   MCHC 32.1     CMP:     Recent Labs  Lab 02/06/18  0530   GLU 91   CALCIUM 9.1   ALBUMIN 1.8*   PROT 5.5*      K 3.9   CO2 23      BUN 23*   CREATININE 3.7*   ALKPHOS 105   ALT 5*   AST 21   BILITOT 2.3*     Coagulation: No results for input(s): PT, INR, APTT in the last 168 hours.  Labs within the past 24 hours have been reviewed.    Diagnostic Results:  None    Assessment/Plan:     Fever    - Pt afebrile now overnight.   - Started on vanc/cefepime for ERCP. Now transitioned to vanc/zosyn   - Blood cultures sent, prelim NGTD.   - thoracentesis 2/1 - fluid negative for infection  - atelectasis noted on ct scan - start breathing tx & CPT  - now cmv positive - started treatment 2/5/18  - plan to take to OR for exp lap in am          Severe protein-calorie malnutrition    - Poor PO intake since transplant requiring short course of TPN during previous hospital stay.   - albumin remains decreased  but appetite slowly improving per pt.   - Dietary consulted. Will need to closely monitor PO intake.   - supplements also ordered.   - SHANE with tube feeding start 1/16. Stopped 1/17/18, was not tolerating. Shane removed 1/20.  - prealbumin 7 on 1/15.  - TPN started 1/16/18.   - Pt able to eat minimally 1/21 and 1/22 which is an improvement.  - gi consulted - EGD reviewed, increased ppi bid, miralax bid, and metamucil bid, decreased narcotics  - continue reglan, placed SHANE tube with tube feeds          Acute renal failure with tubular necrosis    - HD resumed on previous admission. Now anuric and dialyzes M-W-F.  - Nephrology following.    - HD Sat since held Friday for thora/para.  - HD transitioned to TTS schedule.  - ktm consulted for possible kidney transplant         Other cytomegaloviral diseases    - detected on last CMV PCR.   - case discussed with ID and will hold off on treatment at this time given low WBC.         Liver transplanted    - Post op course complicated by fevers and hyperbilirubinemia.  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  - AST/ALT normal. Tbili/alkphos remain elevated.  - Liver US 1/8 showed 3.9 cm complex fluid collection anterior to right lobe and moderate nonspecific complex ascites inferior to transplant.   - IR placed drain 1/11/18, cell count negative for infection.  - PBS consulted. PBS not comfortable proceeding with EUS with liver biopsy given fever and possible infectious process going on. ERCP 1/17 with stone and sludge. Pt placed on Actigall - treated with 10 day course of abx.  ID now signed off.  - Bili elevated - liver u/s 1/28  - Liver biopsy 1/23 without rejection.  - consulted PBS for ERCP - Choledocholithiasis was found along with sludge, stents placed 1/30  - T bili now improving.             Constipation    - Enema ordered 1/20 and 1/21 with BM  - Encourage ambulation.  - decrease narcotics  - d/c metamucil and miralax bid as makes patient  nauseated  - increase reglan qid            Long-term use of immunosuppressant medication    - Maintenance IS with cyclosporine. MMF on hold for infections and neutropenia. Continue to check cyclosporine level daily. Assess for toxicity and adjust level as needed.        Prophylactic immunotherapy    - See long term use of immunosuppression.         Anemia of chronic disease    - Continue daily CBC.  - H&H trending down and will plan to transfuse one unit of PRBCs today for replacement.   - Previously transfused 1/30, continue procrit  - transfuse 1 unit prbc        Recurrent pleural effusion on right    - CXR in ER with large right pleural effusion with subjective c/o worsening SOB  - thoracentesis completed- 1200 ml removed.  SOB with significant improvement.   - Cell count reviewed and negative for infection.   - resp even and non labored.  O2 sat 94-98%.  - Repeat thora 1/19 negative for infection per cell count  - Reaccumulating fluid on xray 1/21.  - thoracentesis 1/31 - 1.4L removed, negative for infection        Biliary stricture of transplanted liver    - tbili with increase prompting ERCP 1/30 with stones and sludge, stents placed  - T bili now improving. Trend daily.         At risk for opportunistic infections    - Hold Valcyte as WBC low.  CMV PCR 1/11, 1/18 undetected.  - bactrim held.          Delayed surgical wound healing    - wd vac removed 1/12/18.  Wound healing well. Aquacel AG and Mepilex border dressing applied via wound care recs.           Other ascites    - Paracentesis performed 1/11/18 with 1600 ml removed.    - fluid negative for infection.   - Repeat para 1/19 negative for infection per cell count  - no fluid seen for paracentesis on 1/23.        Nausea and vomiting    - h/o constipation, reports having regular BM's with bowel regimen  - vomiting episode x 1 at home. Anti-emetics ordered PRN.  - KUB repeated 1/14/18 given increased abdominal pain- mild, generalized bowel distention  suggesting ileus.  Diet changed to clears as tolerated for bowel rest   - SHANE tube placed 1/16 for tube feeds. Not tolerating tube feeds, stopped 1/17/18.  - D/C shane 1/19, now in place as of 1/25.  - chronic constipation, Cont bowel regimen.   - GI consulted. See protein calorie malnutrition              VTE Risk Mitigation         Ordered     heparin (porcine) injection 1,000 Units  As needed (PRN)     Route:  Intra-Catheter        02/05/18 0945     heparin (porcine) injection 5,000 Units  Every 8 hours     Route:  Subcutaneous        01/31/18 0957     Medium Risk of VTE  Once      01/11/18 0351     Place sequential compression device  Until discontinued      01/11/18 0351          The patients clinical status was discussed at multidisplinary rounds, involving transplant surgery, transplant medicine, pharmacy, nursing, nutrition, and social work    Discharge Planning: not candidate at this time  Monitor kidney function --> may need outpt HD  Monitor HH needs vs rehab for deconditioned status      Seda Camacho NP  Liver Transplant  [facility]

## 2018-02-06 NOTE — PROGRESS NOTES
SW met with pt and pts mom for continuity of care today.   Pt presents in bed, alert and oriented x4, engaging, good eye contact, communicative.  Pt and pts mom both coping well today.  Pt has good support from mom.  Pt did express frustration of length of time he has been here and he is ready to feel better and move on with life.   Team discussed with pt during rounds of patient going to OR tomorrow afternoon for a laproscopic wash out of fluid collections and cultures of collections.  Pt and pts mom in agreement with surgery.   Pt does report he feels better today than last week.   Pt discussed wanting to return to work at some point as well as process for STD going into [employer] benefits.   SW discussed with pt and pts mom needing to complete a form indicating pt with chronic kidney disease and need for chronic HD, as well as patient being eligible for medicare benefits 3 months after being on chronic HD.   Pt voicing concerns of not wanting medicare as he has good health insurance through employer.  SW provided brief education about medicare benefits and directed pt and pts mom to medicare.gov to read more about medicare for ESRD beneficiaries.   EZEKIEL looking for a book as well in the transplant clinic.   Pt and pts mom want to read more before making decisions about applying for medicare on pts behalf.   EZEKIEL Remains available for all transplant resources, education and psychosocial support.

## 2018-02-06 NOTE — CONSULTS
Single lumen 18g x 8 cm midline placed left brachial vein. Max dwell date 3/7/18 , Lot#[id number] .  Needle advanced into the vessel under real time ultrasound guidance.  Image recorded and saved.

## 2018-02-06 NOTE — PLAN OF CARE
Pt is AAOx4; afebrile; vital signs stable. He is scheduled for an ex lap tomorrow. He is NPO at midnight. He ambulates independently with a walker. He is receiving 1U RBC today. Central line pulled from IJ, cath tip sent for culture. Left UA midline placed, which is drawing back blood. PICC team would like us to write in the eval sheet in the patient's chart when we use midline and comment on how it is working. The product is new and should draw back blood better for labs. Mother is at bedside, attentive to pt.

## 2018-02-06 NOTE — SUBJECTIVE & OBJECTIVE
Scheduled Meds:   albuterol-ipratropium 2.5mg-0.5mg/3mL  3 mL Nebulization Q4H WAKE    atovaquone  1,500 mg Oral Daily    cycloSPORINE  175 mg Oral BID    docusate sodium  100 mg Oral BID    epoetin maurisio (PROCRIT) injection  10,000 Units Intravenous Every Mon, Wed, Fri    heparin (porcine)  5,000 Units Subcutaneous Q8H    custom IVPB builder   Intravenous Q24H    levetiracetam oral soln  500 mg Oral BID    levothyroxine  75 mcg Oral Before breakfast    metoclopramide HCl  10 mg Oral QID (AC & HS)    omeprazole  40 mg Oral QAM    custom IVPB builder  2.25 g Intravenous Q12H    polyethylene glycol  17 g Oral BID    psyllium husk (aspartame)  3.4 g Oral BID    ursodiol  300 mg Oral TID    valganciclovir 50 mg/ml  200 mg Oral Every Mon, Wed, Fri     Continuous Infusions:  PRN Meds:sodium chloride, sodium chloride, sodium chloride, sodium chloride 0.9%, acetaminophen, bisacodyl, heparin (porcine), omnipaque, ondansetron, ondansetron, oxyCODONE, prochlorperazine, simethicone, sodium chloride 0.9%    Review of Systems   Constitutional: Positive for activity change, appetite change and fatigue. Negative for chills and fever.   HENT: Negative.  Negative for congestion and facial swelling.    Eyes: Negative for pain, discharge and visual disturbance.   Respiratory: Negative for cough, shortness of breath and wheezing.    Cardiovascular: Negative for chest pain and leg swelling.   Gastrointestinal: Positive for abdominal distention, abdominal pain, constipation and nausea. Negative for vomiting.   Genitourinary: Positive for decreased urine volume. Negative for difficulty urinating.        Anuric   Musculoskeletal: Negative for arthralgias, back pain and myalgias.   Skin: Positive for color change and wound. Negative for rash.   Allergic/Immunologic: Positive for immunocompromised state.   Neurological: Negative for dizziness, seizures, weakness and headaches.   Psychiatric/Behavioral: Positive for decreased  concentration and dysphoric mood. Negative for confusion. The patient is not nervous/anxious.    All other systems reviewed and are negative.    Objective:     Vital Signs (Most Recent):  Temp: 99.4 °F (37.4 °C) (02/06/18 1056)  Pulse: 107 (02/06/18 1056)  Resp: 18 (02/06/18 1056)  BP: 118/64 (02/06/18 1056)  SpO2: (!) 94 % (02/06/18 1056) Vital Signs (24h Range):  Temp:  [98.6 °F (37 °C)-100.3 °F (37.9 °C)] 99.4 °F (37.4 °C)  Pulse:  [] 107  Resp:  [16-20] 18  SpO2:  [94 %-99 %] 94 %  BP: (113-123)/(60-71) 118/64     Weight: 68.3 kg (150 lb 9.2 oz)  Body mass index is 24.3 kg/m².    Intake/Output - Last 3 Shifts       02/04 0700 - 02/05 0659 02/05 0700 - 02/06 0659 02/06 0700 - 02/07 0659    P.O. 360 990     Blood       Other  600     IV Piggyback 850 350     Total Intake(mL/kg) 1210 (17.9) 1940 (28.4)     Urine (mL/kg/hr)  0 (0)     Other  2100 (1.3)     Stool  0 (0)     Total Output   2100      Net +1210 -160             Urine Occurrence 0 x 0 x     Stool Occurrence 0 x 2 x     Emesis Occurrence 0 x            Physical Exam   Constitutional: He is oriented to person, place, and time. He appears well-developed. No distress.   Temporal and distal extremity muscle wasting   HENT:   Head: Normocephalic and atraumatic.   Mouth/Throat: No oropharyngeal exudate.   dylan tube   Eyes: EOM are normal. Pupils are equal, round, and reactive to light. Scleral icterus is present.   Neck: Normal range of motion. Neck supple. No JVD present. No thyromegaly present.   Cardiovascular: Normal rate, regular rhythm, normal heart sounds and intact distal pulses.    No murmur heard.  Pulmonary/Chest: Effort normal. No respiratory distress. He has decreased breath sounds in the right middle field, the right lower field and the left lower field. He has no wheezes. He exhibits no tenderness.   Diminished to RLL   Abdominal: Soft. Bowel sounds are normal. He exhibits ascites. He exhibits no distension. There is tenderness (mild). There  is no rebound and no guarding.   Dressing to chevron.  Wd vac removed 1/12/18  Dependent edema present R flank; surrounding erythema   Musculoskeletal: Normal range of motion. He exhibits edema (2+ LE edema). He exhibits no tenderness.   Neurological: He is alert and oriented to person, place, and time. He has normal reflexes.   Skin: Skin is warm and dry. Capillary refill takes 2 to 3 seconds. He is not diaphoretic. No erythema.   jaundice   Psychiatric: He has a normal mood and affect. His behavior is normal. Judgment and thought content normal. His mood appears not anxious.   Depressed mood   Nursing note and vitals reviewed.      Laboratory:  Immunosuppressants         Stop Route Frequency     cycloSPORINE (NEORAL) 100 mg/mL microemulsion solution 175 mg      -- Oral 2 times daily        CBC:     Recent Labs  Lab 02/06/18  0530   WBC 2.93*   RBC 2.16*   HGB 6.8*   HCT 21.2*      MCV 98   MCH 31.5*   MCHC 32.1     CMP:     Recent Labs  Lab 02/06/18  0530   GLU 91   CALCIUM 9.1   ALBUMIN 1.8*   PROT 5.5*      K 3.9   CO2 23      BUN 23*   CREATININE 3.7*   ALKPHOS 105   ALT 5*   AST 21   BILITOT 2.3*     Coagulation: No results for input(s): PT, INR, APTT in the last 168 hours.  Labs within the past 24 hours have been reviewed.    Diagnostic Results:  None

## 2018-02-06 NOTE — ASSESSMENT & PLAN NOTE
Nutrition Diagnosis  Inadequate oral intake    Related to (etiology):   Decreased appetite, abdominal distention    Signs and Symptoms (as evidenced by):   Pt eating 0% of meals and relying on enteral nutrition for 100% of EPN, EEN     Interventions/Recommendations (treatment strategy):  See recs    Nutrition Diagnosis Status:   Improving

## 2018-02-06 NOTE — ANESTHESIA PREPROCEDURE EVALUATION
[facility]  Anesthesia Pre-Operative Evaluation         Patient Name: Jhonny Diana  YOB: 1990  MRN: [id number]    SUBJECTIVE:     Pre-operative evaluation for Procedure(s) (LRB):  EXPLORATORY LAPAROSCOPY (N/A)     02/06/2018    Jhonny Diana is a 27 y.o. male w/ a significant PMHx of alcoholic cirrhosis S/p DDLT on 10/19/2017 complicated by seizures (swtiched off prograf to cyclo), ATN requiring HD (M-W-F, last 1/10, anuric), superficial wound infection s/p wound vac to chevron incision, and multiple admissions for fevers on 11/21 (discharged on empiric augmentin for suspected superficial wound infection), readmitted 11/24 again with fever, and 12/3. He has undergone extensive work up for these recurrent infections. Plan is to proceed with ex lap to find source of infection. He is s/p multiple ERCPs without resolution and continues to worsen. Patient now presents for the above procedure(s).      LDA:        Hemodialysis Catheter 10/30/17 1140 right internal jugular (Active)   Site Assessment Clean;Dry;Intact;No redness;No swelling 2/6/2018  8:11 AM   Status Deaccessed 2/6/2018  8:11 AM   Flows Good 2/6/2018  8:11 AM   Dressing Intervention Dressing changed 2/5/2018 11:30 AM   Dressing Status Biopatch in place;Clean;Dry;Intact 2/6/2018  8:11 AM   Dressing Change Due 02/12/18 2/6/2018  8:11 AM   Site Condition No complications 2/5/2018  8:08 PM   Dressing Occlusive 2/5/2018  8:08 PM   Daily Line Review Performed 2/5/2018  8:08 PM   Arterial Volume (mL) 1.8 mL 2/5/2018 11:30 AM   Venous Volume (mL) 1.8 mL 2/5/2018 11:30 AM   Number of days: 99            Percutaneous Central Line Insertion/Assessment - triple lumen  01/16/18 1800 left internal jugular (Active)   Dressing biopatch in place;dressing dry and intact 2/6/2018  8:11 AM   Securement secured w/ sutures 2/6/2018  8:11 AM   Additional Site Signs no erythema;no warmth;no edema;no pain;no palpable cord;no streak formation;no drainage  2/4/2018  7:58 PM   Distal Patency/Care flushed w/o difficulty 2/6/2018  8:11 AM   Medial Patency/Care flushed w/o difficulty 2/6/2018  8:11 AM   Proximal Patency/Care flushed w/o difficulty 2/6/2018  8:11 AM   Dressing Change Due 02/10/18 2/6/2018  8:11 AM   Daily Line Review Performed 2/5/2018  8:08 PM   Number of days: 20            Midline Catheter Insertion/Assessment  - Single Lumen 02/06/18 0917 Left brachial vein 18g x 8cm (Active)   Site Assessment Clean;Dry;Intact;No redness;No swelling 2/6/2018 10:00 AM   IV Device Securement catheter securement device 2/6/2018 10:00 AM   Line Status Blood return noted;Flushed;Saline locked 2/6/2018 10:00 AM   Dressing Status Biopatch in place;Clean;Dry;Intact 2/6/2018 10:00 AM   Dressing Intervention New dressing 2/6/2018 10:00 AM   Dressing Change Due 02/13/18 2/6/2018 10:00 AM   Site Change Due 03/07/18 2/6/2018 10:00 AM   Number of days: 0            Trans Pyloric Feeding Tube 01/26/18 nasogastric (Active)   Placement Check placement verified by x-ray 2/6/2018  8:11 AM   Distal Tube Length (cm) 85 2/6/2018  8:11 AM   Tolerance no signs/symptoms of discomfort 2/5/2018  8:08 PM   Securement anchored to nostril center w/ adhesive device 2/6/2018  8:11 AM   Clamp Status/Tolerance clamped;no abdominal discomfort;no abdominal distention;no emesis;no nausea;no residual;no restlessness 2/5/2018  8:08 PM   Insertion Site Appearance no redness, warmth, tenderness, skin breakdown, drainage 2/5/2018  8:08 PM   Drainage None 1/31/2018  7:17 PM   Flush/Irrigation flushed w/;water;no resistance met 2/5/2018  8:08 PM   Feeding Method continuous 2/5/2018  8:08 PM   Current Rate (mL/hr) 0 mL/hr 2/6/2018  8:11 AM   Goal Rate (mL/hr) 45 mL/hr 2/5/2018  8:08 PM   Intake (mL) 30 mL 1/31/2018  7:17 PM   Intake (mL) - Breast Milk Tube Feeding 30 1/27/2018  3:00 PM   Intake (mL) - Formula Tube Feeding 0 2/3/2018  5:01 AM   Residual Amount (ml) 0 ml 2/1/2018  9:20 PM   Number of days: 11        Prev airway:    Present Prior to Hospital Arrival?: No; Placement Date: 10/19/17; Placement Time: 1747; Method of Intubation: Direct laryngoscopy; Inserted by: CRNA; Airway Device: Endotracheal Tube; Mask Ventilation: Not Attempted; Intubated: Postinduction; Blade: Moore #2; Airway Device Size: 8.0; Style: Cuffed; Cuff Inflation: Minimal occlusive pressure; Inflation Amount: 5; Placement Verified By: Auscultation, Capnometry, ETT Condensation; Grade: Grade I; Complicating Factors: None; Intubation Findings: Positive EtCO2, Bilateral breath sounds, Atraumatic/Condition of teeth unchanged;  Depth of Insertion: 22; Securment: Lips; Complications: None; Breath Sounds: Equal Bilateral; Insertion Attempts: 1; Removal Date: 10/21/17;  Removal Time: 1633; Removal Indication & Assessment: removed per order; Name of Person who Removed: RUPESH Mclean CRT    Drips: None documented.      Patient Active Problem List   Diagnosis    Alcoholism /alcohol abuse    Acute renal failure with tubular necrosis    Leukocytosis    Liver transplanted    Prophylactic immunotherapy    Long-term use of immunosuppressant medication    At risk for opportunistic infections    Seizure    Delayed surgical wound healing    Anemia of chronic disease    Biliary stricture of transplanted liver    Severe malnutrition    Hypothyroidism    Hypervolemia    Recurrent pleural effusion on right    N&V (nausea and vomiting)    Nausea and vomiting    Severe protein-calorie malnutrition    Other ascites    Constipation    Abdominal pain    Fever    Kidney transplant candidate    Pleural effusion, right    Other cytomegaloviral diseases       Review of patient's allergies indicates:   Allergen Reactions    Bactrim [sulfamethoxazole-trimethoprim] Other (See Comments)     Mookie Trell Syndrome       Current Inpatient Medications:   albuterol-ipratropium 2.5mg-0.5mg/3mL  3 mL Nebulization Q4H WAKE    atovaquone  1,500 mg Oral Daily     cycloSPORINE  175 mg Oral BID    docusate sodium  100 mg Oral BID    epoetin maurisio (PROCRIT) injection  10,000 Units Intravenous Every Mon, Wed, Fri    heparin (porcine)  5,000 Units Subcutaneous Q8H    custom IVPB builder   Intravenous Q24H    levetiracetam oral soln  500 mg Oral BID    levothyroxine  75 mcg Oral Before breakfast    metoclopramide HCl  10 mg Oral QID (AC & HS)    omeprazole  40 mg Oral QAM    custom IVPB builder  2.25 g Intravenous Q12H    polyethylene glycol  17 g Oral BID    psyllium husk (aspartame)  3.4 g Oral BID    ursodiol  300 mg Oral TID    valganciclovir 50 mg/ml  200 mg Oral Every Mon, Wed, Fri       No current facility-administered medications on file prior to encounter.      Current Outpatient Prescriptions on File Prior to Encounter   Medication Sig Dispense Refill    aspirin (ECOTRIN) 81 MG EC tablet Take 1 tablet (81 mg total) by mouth once daily. 30 tablet 5    atovaquone (MEPRON) 750 mg/5 mL Susp Take 10 mLs (1,500 mg total) by mouth once daily. Stop on 4/17/18 300 mL 5    cycloSPORINE modified, NEORAL, 25 MG capsule Take 3 capsules (75 mg total) by mouth 2 (two) times daily. 90 capsule 5    docusate sodium (COLACE) 100 MG capsule Take 100 mg by mouth 3 (three) times daily as needed for Constipation.      ergocalciferol (ERGOCALCIFEROL) 50,000 unit Cap Take 1 capsule (50,000 Units total) by mouth every 7 days. 4 capsule 5    levETIRAcetam (KEPPRA) 500 MG Tab Take 1 tablet (500 mg total) by mouth 2 (two) times daily. 60 tablet 11    levothyroxine (SYNTHROID) 75 MCG tablet Take 1 tablet (75 mcg total) by mouth before breakfast. 30 tablet 11    mirtazapine (REMERON) 7.5 MG Tab Take 1 tablet (7.5 mg total) by mouth every evening. 30 tablet 11    multivitamin (THERAGRAN) tablet Take 1 tablet by mouth once daily. 30 tablet 11    ondansetron (ZOFRAN-ODT) 8 MG TbDL Take 1 tablet (8 mg total) by mouth every 8 (eight) hours as needed (nausea). 30 tablet 1    oxyCODONE  (ROXICODONE) 10 mg Tab immediate release tablet Take 0.5-1 tablets (5-10 mg total) by mouth every 6 (six) hours as needed for Pain. 30 tablet 0    pantoprazole (PROTONIX) 40 MG tablet Take 1 tablet (40 mg total) by mouth once daily. 30 tablet 5    sodium bicarbonate 650 MG tablet Take 2 tablets (1,300 mg total) by mouth 2 (two) times daily. 120 tablet 2    thiamine 100 MG tablet Take 1 tablet (100 mg total) by mouth once daily. 30 tablet 6    ursodiol (ACTIGALL) 300 mg capsule Take 1 capsule (300 mg total) by mouth 2 (two) times daily. 60 capsule 11       Past Surgical History:   Procedure Laterality Date    APPENDECTOMY      ERCP      ESOPHAGOGASTRODUODENOSCOPY      LIVER TRANSPLANT         Social History     Social History    Marital status: Single     Spouse name: N/A    Number of children: N/A    Years of education: N/A     Occupational History    Not on file.     Social History Main Topics    Smoking status: Former Smoker     Packs/day: 1.00     Years: 10.00     Types: Cigarettes    Smokeless tobacco: Never Used    Alcohol use No      Comment: a fifth of liquor daily for years, cut back over last 2 months    Drug use: No    Sexual activity: Not on file     Other Topics Concern    Not on file     Social History Narrative    No narrative on file       OBJECTIVE:     Vital Signs Range (Last 24H):  Temp:  [37 °C (98.6 °F)-37.9 °C (100.3 °F)]   Pulse:  []   Resp:  [16-20]   BP: (113-123)/(60-71)   SpO2:  [94 %-99 %]       CBC:   Recent Labs      02/05/18   0500  02/05/18   0853  02/06/18   0530   WBC  2.98*   --   2.93*   RBC  2.18*   --   2.16*   HGB  6.7*  7.1*  6.8*   HCT  20.6*  21.8*  21.2*   PLT  133*   --   156   MCV  95   --   98   MCH  30.7   --   31.5*   MCHC  32.5   --   32.1       CMP:   Recent Labs      02/05/18   0500  02/06/18   0530   NA  133*  136   K  4.0  3.9   CL  98  102   CO2  22*  23   BUN  45*  23*   CREATININE  5.5*  3.7*   GLU  73  91   MG  1.7  1.9   PHOS  3.6  3.1    CALCIUM  9.5  9.1   ALBUMIN  1.7*  1.8*   PROT  5.3*  5.5*   ALKPHOS  99  105   ALT  5*  5*   AST  16  21   BILITOT  2.5*  2.3*       INR:  No results for input(s): PT, INR, PROTIME, APTT in the last 72 hours.    Diagnostic Studies:     EK/11/18    Test Reason : R50.9  Blood Pressure : 097/056 mmHG  Vent. Rate : 091 BPM     Atrial Rate : 091 BPM     P-R Int : 144 ms          QRS Dur : 084 ms      QT Int : 378 ms       P-R-T Axes : 048 040 049 degrees     QTc Int : 464 ms    Normal sinus rhythm  Normal ECG  When compared with ECG of 03-DEC-2017 20:16,  Nonspecific T wave abnormality now evident in Lateral leads  Confirmed by Armando TEJADA, Jason Fox ([id number]) on 2018 11:22:33    2D ECHO:  Results for orders placed or performed during the hospital encounter of 17   2D echo with color flow doppler   Result Value Ref Range    EF 65 55 - 65    Mitral Valve Regurgitation TRIVIAL     Diastolic Dysfunction No     Pericardial Effusion TRIVIAL     Tricuspid Valve Regurgitation TRIVIAL          ASSESSMENT/PLAN:         Anesthesia Evaluation    I have reviewed the Patient Summary Reports.    I have reviewed the Nursing Notes.      Review of Systems  Anesthesia Hx:  No problems with previous Anesthesia  History of prior surgery of interest to airway management or planning: liver transplant. Previous anesthesia: General Denies Family Hx of Anesthesia complications.   Denies Personal Hx of Anesthesia complications.   Social:  Non-Smoker, Alcohol Use    Hematology/Oncology:  Hematology Normal   Oncology Normal     Cardiovascular:   Denies Hypertension.  Denies MI.    Denies Angina.    Pulmonary:  Pulmonary Normal  Denies COPD.  Denies Asthma.  Denies Shortness of breath.    Renal/:   Chronic Renal Disease    Hepatic/GI:   Liver Disease, (liver transplant.) Hepatitis Complicated hospital and transplant course see HPI.   Neurological:   Denies TIA. Denies CVA. Seizures    Endocrine:   Denies Diabetes.  Hypothyroidism    Psych:   Psychiatric History          Physical Exam  General:  Well nourished    Airway/Jaw/Neck:  Airway Findings: Mouth Opening: Normal Tongue: Normal  General Airway Assessment: Adult, Average  Mallampati: II  TM Distance: Normal, at least 6 cm  Jaw/Neck Findings:  Neck ROM: Normal ROM  Neck Findings: Normal     Dental:  Dental Findings: In tact, Periodontal disease, Mild   Chest/Lungs:  Chest/Lungs Findings: Clear to auscultation, Normal Respiratory Rate     Heart/Vascular:  Heart Findings: Rate: Normal  Rhythm: Regular Rhythm  Sounds: Normal  Heart murmur: negative Vascular Findings: Normal     Musculoskeletal:  Musculoskeletal Findings: Normal   Skin:  Skin Findings: Normal    Mental Status:  Mental Status Findings:  Cooperative, Alert and Oriented         Anesthesia Plan  Type of Anesthesia, risks & benefits discussed:  Anesthesia Type:  general  Patient's Preference:   Intra-op Monitoring Plan: standard ASA monitors  Intra-op Monitoring Plan Comments:   Post Op Pain Control Plan: multimodal analgesia and per primary service following discharge from PACU  Post Op Pain Control Plan Comments:   Induction:   IV  Beta Blocker:  Patient is not currently on a Beta-Blocker (No further documentation required).       Informed Consent: Patient understands risks and agrees with Anesthesia plan.  Questions answered. Anesthesia consent signed with patient.  ASA Score: 3     Day of Surgery Review of History & Physical:    H&P update referred to the surgeon.     Anesthesia Plan Notes: Plan RSI        Ready For Surgery From Anesthesia Perspective.

## 2018-02-06 NOTE — ASSESSMENT & PLAN NOTE
- Continue daily CBC.  - H&H trending down and will plan to transfuse one unit of PRBCs today for replacement.   - Previously transfused 1/30, continue procrit  - transfuse 1 unit prbc

## 2018-02-07 ENCOUNTER — TELEPHONE (OUTPATIENT)
Dept: GASTROENTEROLOGY | Facility: CLINIC | Age: 28
End: 2018-02-07

## 2018-02-07 ENCOUNTER — ANESTHESIA (OUTPATIENT)
Dept: SURGERY | Facility: HOSPITAL | Age: 28
DRG: 628 | End: 2018-02-07
Payer: COMMERCIAL

## 2018-02-07 DIAGNOSIS — K83.1 BILIARY STRICTURE: Primary | ICD-10-CM

## 2018-02-07 LAB
ALBUMIN SERPL BCP-MCNC: 1.8 G/DL
ALP SERPL-CCNC: 101 U/L
ALT SERPL W/O P-5'-P-CCNC: 5 U/L
ANION GAP SERPL CALC-SCNC: 12 MMOL/L
AST SERPL-CCNC: 17 U/L
BACTERIA SPEC ANAEROBE CULT: NORMAL
BASOPHILS # BLD AUTO: 0.01 K/UL
BASOPHILS NFR BLD: 0.3 %
BILIRUB FLD-MCNC: 1.4 MG/DL
BILIRUB SERPL-MCNC: 2.3 MG/DL
BODY FLUID SOURCE, BILIRUBIN: NORMAL
BUN SERPL-MCNC: 28 MG/DL
CALCIUM SERPL-MCNC: 9.4 MG/DL
CHLORIDE SERPL-SCNC: 102 MMOL/L
CO2 SERPL-SCNC: 22 MMOL/L
CREAT SERPL-MCNC: 4.7 MG/DL
CYCLOSPORINE BLD LC/MS/MS-MCNC: 178 NG/ML
DIFFERENTIAL METHOD: ABNORMAL
EOSINOPHIL # BLD AUTO: 0.4 K/UL
EOSINOPHIL NFR BLD: 11.3 %
ERYTHROCYTE [DISTWIDTH] IN BLOOD BY AUTOMATED COUNT: 14.6 %
EST. GFR  (AFRICAN AMERICAN): 18.2 ML/MIN/1.73 M^2
EST. GFR  (NON AFRICAN AMERICAN): 15.7 ML/MIN/1.73 M^2
GLUCOSE SERPL-MCNC: 66 MG/DL
GRAM STN SPEC: NORMAL
GRAM STN SPEC: NORMAL
HCT VFR BLD AUTO: 23.5 %
HGB BLD-MCNC: 7.8 G/DL
IMM GRANULOCYTES # BLD AUTO: 0.13 K/UL
IMM GRANULOCYTES NFR BLD AUTO: 4 %
LYMPHOCYTES # BLD AUTO: 0.9 K/UL
LYMPHOCYTES NFR BLD: 27.3 %
MAGNESIUM SERPL-MCNC: 1.8 MG/DL
MCH RBC QN AUTO: 31.3 PG
MCHC RBC AUTO-ENTMCNC: 33.2 G/DL
MCV RBC AUTO: 94 FL
MONOCYTES # BLD AUTO: 0.6 K/UL
MONOCYTES NFR BLD: 17.5 %
NEUTROPHILS # BLD AUTO: 1.3 K/UL
NEUTROPHILS NFR BLD: 39.6 %
NRBC BLD-RTO: 0 /100 WBC
PHOSPHATE SERPL-MCNC: 3.8 MG/DL
PLATELET # BLD AUTO: 176 K/UL
PMV BLD AUTO: 10.9 FL
POCT GLUCOSE: 73 MG/DL (ref 70–110)
POTASSIUM SERPL-SCNC: 4.1 MMOL/L
PROT SERPL-MCNC: 5.4 G/DL
RBC # BLD AUTO: 2.49 M/UL
SODIUM SERPL-SCNC: 136 MMOL/L
VANCOMYCIN SERPL-MCNC: 12.7 UG/ML
WBC # BLD AUTO: 3.26 K/UL

## 2018-02-07 PROCEDURE — 63600175 PHARM REV CODE 636 W HCPCS: Mod: NTX | Performed by: NURSE ANESTHETIST, CERTIFIED REGISTERED

## 2018-02-07 PROCEDURE — 71000033 HC RECOVERY, INTIAL HOUR: Performed by: TRANSPLANT SURGERY

## 2018-02-07 PROCEDURE — 63600175 PHARM REV CODE 636 W HCPCS: Performed by: NURSE PRACTITIONER

## 2018-02-07 PROCEDURE — 83735 ASSAY OF MAGNESIUM: CPT

## 2018-02-07 PROCEDURE — 36000706: Performed by: TRANSPLANT SURGERY

## 2018-02-07 PROCEDURE — 96372 THER/PROPH/DIAG INJ SC/IM: CPT

## 2018-02-07 PROCEDURE — 25000003 PHARM REV CODE 250: Performed by: NURSE PRACTITIONER

## 2018-02-07 PROCEDURE — 37000009 HC ANESTHESIA EA ADD 15 MINS: Performed by: TRANSPLANT SURGERY

## 2018-02-07 PROCEDURE — 25000003 PHARM REV CODE 250: Performed by: TRANSPLANT SURGERY

## 2018-02-07 PROCEDURE — 25000003 PHARM REV CODE 250: Performed by: PHYSICIAN ASSISTANT

## 2018-02-07 PROCEDURE — 20600001 HC STEP DOWN PRIVATE ROOM

## 2018-02-07 PROCEDURE — 63600175 PHARM REV CODE 636 W HCPCS: Mod: JG | Performed by: NURSE PRACTITIONER

## 2018-02-07 PROCEDURE — 27201423 OPTIME MED/SURG SUP & DEVICES STERILE SUPPLY: Performed by: TRANSPLANT SURGERY

## 2018-02-07 PROCEDURE — 87205 SMEAR GRAM STAIN: CPT

## 2018-02-07 PROCEDURE — 87116 MYCOBACTERIA CULTURE: CPT

## 2018-02-07 PROCEDURE — 0W9G40Z DRAINAGE OF PERITONEAL CAVITY WITH DRAINAGE DEVICE, PERCUTANEOUS ENDOSCOPIC APPROACH: ICD-10-PCS | Performed by: TRANSPLANT SURGERY

## 2018-02-07 PROCEDURE — 63600175 PHARM REV CODE 636 W HCPCS: Performed by: PHYSICIAN ASSISTANT

## 2018-02-07 PROCEDURE — 99233 SBSQ HOSP IP/OBS HIGH 50: CPT | Mod: ,,, | Performed by: PHYSICIAN ASSISTANT

## 2018-02-07 PROCEDURE — 25000003 PHARM REV CODE 250: Mod: NTX | Performed by: NURSE ANESTHETIST, CERTIFIED REGISTERED

## 2018-02-07 PROCEDURE — 80053 COMPREHEN METABOLIC PANEL: CPT

## 2018-02-07 PROCEDURE — 84100 ASSAY OF PHOSPHORUS: CPT

## 2018-02-07 PROCEDURE — 80158 DRUG ASSAY CYCLOSPORINE: CPT

## 2018-02-07 PROCEDURE — 90935 HEMODIALYSIS ONE EVALUATION: CPT

## 2018-02-07 PROCEDURE — 71000039 HC RECOVERY, EACH ADD'L HOUR: Performed by: TRANSPLANT SURGERY

## 2018-02-07 PROCEDURE — 99900035 HC TECH TIME PER 15 MIN (STAT)

## 2018-02-07 PROCEDURE — 37000008 HC ANESTHESIA 1ST 15 MINUTES: Performed by: TRANSPLANT SURGERY

## 2018-02-07 PROCEDURE — D9220A PRA ANESTHESIA: Mod: NTX,,, | Performed by: ANESTHESIOLOGY

## 2018-02-07 PROCEDURE — 80202 ASSAY OF VANCOMYCIN: CPT

## 2018-02-07 PROCEDURE — 94761 N-INVAS EAR/PLS OXIMETRY MLT: CPT

## 2018-02-07 PROCEDURE — 85025 COMPLETE CBC W/AUTO DIFF WBC: CPT

## 2018-02-07 PROCEDURE — 87102 FUNGUS ISOLATION CULTURE: CPT

## 2018-02-07 PROCEDURE — 87070 CULTURE OTHR SPECIMN AEROBIC: CPT

## 2018-02-07 PROCEDURE — 49320 DIAG LAPARO SEPARATE PROC: CPT | Mod: ,,, | Performed by: TRANSPLANT SURGERY

## 2018-02-07 PROCEDURE — 82247 BILIRUBIN TOTAL: CPT

## 2018-02-07 PROCEDURE — 90935 HEMODIALYSIS ONE EVALUATION: CPT | Mod: ,,, | Performed by: INTERNAL MEDICINE

## 2018-02-07 PROCEDURE — 87075 CULTR BACTERIA EXCEPT BLOOD: CPT

## 2018-02-07 PROCEDURE — 25000242 PHARM REV CODE 250 ALT 637 W/ HCPCS: Performed by: NURSE PRACTITIONER

## 2018-02-07 PROCEDURE — 94640 AIRWAY INHALATION TREATMENT: CPT

## 2018-02-07 PROCEDURE — 36000707: Performed by: TRANSPLANT SURGERY

## 2018-02-07 RX ORDER — SUCCINYLCHOLINE CHLORIDE 20 MG/ML
INJECTION INTRAMUSCULAR; INTRAVENOUS
Status: DISCONTINUED | OUTPATIENT
Start: 2018-02-07 | End: 2018-02-07

## 2018-02-07 RX ORDER — FENTANYL CITRATE 50 UG/ML
INJECTION, SOLUTION INTRAMUSCULAR; INTRAVENOUS
Status: DISCONTINUED | OUTPATIENT
Start: 2018-02-07 | End: 2018-02-07

## 2018-02-07 RX ORDER — PROPOFOL 10 MG/ML
VIAL (ML) INTRAVENOUS
Status: DISCONTINUED | OUTPATIENT
Start: 2018-02-07 | End: 2018-02-07

## 2018-02-07 RX ORDER — MIDAZOLAM HYDROCHLORIDE 1 MG/ML
INJECTION, SOLUTION INTRAMUSCULAR; INTRAVENOUS
Status: DISCONTINUED | OUTPATIENT
Start: 2018-02-07 | End: 2018-02-07

## 2018-02-07 RX ORDER — LIDOCAINE HCL/PF 100 MG/5ML
SYRINGE (ML) INTRAVENOUS
Status: DISCONTINUED | OUTPATIENT
Start: 2018-02-07 | End: 2018-02-07

## 2018-02-07 RX ORDER — OXYCODONE HYDROCHLORIDE 5 MG/1
5 TABLET ORAL EVERY 4 HOURS PRN
Status: DISCONTINUED | OUTPATIENT
Start: 2018-02-07 | End: 2018-02-12

## 2018-02-07 RX ORDER — OXYCODONE HYDROCHLORIDE 5 MG/1
5 TABLET ORAL ONCE
Status: COMPLETED | OUTPATIENT
Start: 2018-02-07 | End: 2018-02-07

## 2018-02-07 RX ORDER — BUPIVACAINE HYDROCHLORIDE 2.5 MG/ML
INJECTION, SOLUTION EPIDURAL; INFILTRATION; INTRACAUDAL
Status: DISCONTINUED | OUTPATIENT
Start: 2018-02-07 | End: 2018-02-07 | Stop reason: HOSPADM

## 2018-02-07 RX ORDER — CISATRACURIUM BESYLATE 10 MG/ML
INJECTION, SOLUTION INTRAVENOUS
Status: DISCONTINUED | OUTPATIENT
Start: 2018-02-07 | End: 2018-02-07

## 2018-02-07 RX ORDER — ONDANSETRON 2 MG/ML
INJECTION INTRAMUSCULAR; INTRAVENOUS
Status: DISCONTINUED | OUTPATIENT
Start: 2018-02-07 | End: 2018-02-07

## 2018-02-07 RX ORDER — SODIUM CHLORIDE 9 MG/ML
INJECTION, SOLUTION INTRAVENOUS ONCE
Status: COMPLETED | OUTPATIENT
Start: 2018-02-07 | End: 2018-02-07

## 2018-02-07 RX ORDER — SODIUM CHLORIDE 0.9 % (FLUSH) 0.9 %
3 SYRINGE (ML) INJECTION
Status: DISCONTINUED | OUTPATIENT
Start: 2018-02-07 | End: 2018-02-22

## 2018-02-07 RX ORDER — OXYCODONE HYDROCHLORIDE 5 MG/1
10 TABLET ORAL EVERY 4 HOURS PRN
Status: DISCONTINUED | OUTPATIENT
Start: 2018-02-08 | End: 2018-02-12

## 2018-02-07 RX ORDER — GLYCOPYRROLATE 0.2 MG/ML
INJECTION INTRAMUSCULAR; INTRAVENOUS
Status: DISCONTINUED | OUTPATIENT
Start: 2018-02-07 | End: 2018-02-07

## 2018-02-07 RX ORDER — CEFAZOLIN SODIUM 1 G/3ML
INJECTION, POWDER, FOR SOLUTION INTRAMUSCULAR; INTRAVENOUS
Status: DISCONTINUED | OUTPATIENT
Start: 2018-02-07 | End: 2018-02-07

## 2018-02-07 RX ORDER — SODIUM CHLORIDE 9 MG/ML
INJECTION, SOLUTION INTRAVENOUS CONTINUOUS PRN
Status: DISCONTINUED | OUTPATIENT
Start: 2018-02-07 | End: 2018-02-07

## 2018-02-07 RX ORDER — NEOSTIGMINE METHYLSULFATE 1 MG/ML
INJECTION, SOLUTION INTRAVENOUS
Status: DISCONTINUED | OUTPATIENT
Start: 2018-02-07 | End: 2018-02-07

## 2018-02-07 RX ADMIN — ERYTHROPOIETIN 10000 UNITS: 10000 INJECTION, SOLUTION INTRAVENOUS; SUBCUTANEOUS at 11:02

## 2018-02-07 RX ADMIN — GLYCOPYRROLATE 0.4 MG: 0.2 INJECTION, SOLUTION INTRAMUSCULAR; INTRAVENOUS at 02:02

## 2018-02-07 RX ADMIN — OXYCODONE HYDROCHLORIDE 5 MG: 5 TABLET ORAL at 09:02

## 2018-02-07 RX ADMIN — ONDANSETRON 4 MG: 2 INJECTION INTRAMUSCULAR; INTRAVENOUS at 02:02

## 2018-02-07 RX ADMIN — SUCCINYLCHOLINE CHLORIDE 120 MG: 20 INJECTION, SOLUTION INTRAMUSCULAR; INTRAVENOUS at 02:02

## 2018-02-07 RX ADMIN — NEOSTIGMINE METHYLSULFATE 4 MG: 1 INJECTION INTRAVENOUS at 02:02

## 2018-02-07 RX ADMIN — CISATRACURIUM BESYLATE 4 MG: 10 INJECTION INTRAVENOUS at 02:02

## 2018-02-07 RX ADMIN — METOCLOPRAMIDE HYDROCHLORIDE 10 MG: 5 SOLUTION ORAL at 05:02

## 2018-02-07 RX ADMIN — PROCHLORPERAZINE EDISYLATE 10 MG: 5 INJECTION INTRAMUSCULAR; INTRAVENOUS at 11:02

## 2018-02-07 RX ADMIN — URSODIOL 300 MG: 300 CAPSULE ORAL at 05:02

## 2018-02-07 RX ADMIN — URSODIOL 300 MG: 300 CAPSULE ORAL at 08:02

## 2018-02-07 RX ADMIN — LIDOCAINE HYDROCHLORIDE 70 MG: 20 INJECTION, SOLUTION INTRAVENOUS at 02:02

## 2018-02-07 RX ADMIN — IPRATROPIUM BROMIDE AND ALBUTEROL SULFATE 3 ML: .5; 3 SOLUTION RESPIRATORY (INHALATION) at 11:02

## 2018-02-07 RX ADMIN — PROPOFOL 150 MG: 10 INJECTION, EMULSION INTRAVENOUS at 02:02

## 2018-02-07 RX ADMIN — VANCOMYCIN 1000 MG: 1 INJECTION, SOLUTION INTRAVENOUS at 08:02

## 2018-02-07 RX ADMIN — FENTANYL CITRATE 50 MCG: 50 INJECTION, SOLUTION INTRAMUSCULAR; INTRAVENOUS at 02:02

## 2018-02-07 RX ADMIN — PIPERACILLIN AND TAZOBACTAM 2.25 G: 4; .5 INJECTION, POWDER, LYOPHILIZED, FOR SOLUTION INTRAVENOUS; PARENTERAL at 10:02

## 2018-02-07 RX ADMIN — CYCLOSPORINE 175 MG: 100 SOLUTION ORAL at 05:02

## 2018-02-07 RX ADMIN — MORPHINE 200 MG: 10 SOLUTION ORAL at 05:02

## 2018-02-07 RX ADMIN — OXYCODONE HYDROCHLORIDE 5 MG: 5 TABLET ORAL at 05:02

## 2018-02-07 RX ADMIN — ATOVAQUONE 1500 MG: 750 SUSPENSION ORAL at 05:02

## 2018-02-07 RX ADMIN — CISATRACURIUM BESYLATE 2 MG: 10 INJECTION INTRAVENOUS at 02:02

## 2018-02-07 RX ADMIN — HEPARIN SODIUM 1000 UNITS: 1000 INJECTION, SOLUTION INTRAVENOUS; SUBCUTANEOUS at 11:02

## 2018-02-07 RX ADMIN — METOCLOPRAMIDE HYDROCHLORIDE 10 MG: 5 SOLUTION ORAL at 08:02

## 2018-02-07 RX ADMIN — MIDAZOLAM HYDROCHLORIDE 2 MG: 1 INJECTION, SOLUTION INTRAMUSCULAR; INTRAVENOUS at 01:02

## 2018-02-07 RX ADMIN — OMEPRAZOLE 40 MG: 40 CAPSULE, DELAYED RELEASE ORAL at 05:02

## 2018-02-07 RX ADMIN — SODIUM CHLORIDE: 0.9 INJECTION, SOLUTION INTRAVENOUS at 08:02

## 2018-02-07 RX ADMIN — SODIUM CHLORIDE: 0.9 INJECTION, SOLUTION INTRAVENOUS at 01:02

## 2018-02-07 RX ADMIN — ONDANSETRON HYDROCHLORIDE 4 MG: 2 INJECTION, SOLUTION INTRAMUSCULAR; INTRAVENOUS at 09:02

## 2018-02-07 RX ADMIN — LEVETIRACETAM 500 MG: 100 SOLUTION ORAL at 08:02

## 2018-02-07 RX ADMIN — FENTANYL CITRATE 50 MCG: 50 INJECTION, SOLUTION INTRAMUSCULAR; INTRAVENOUS at 03:02

## 2018-02-07 RX ADMIN — CEFAZOLIN 1 G: 330 INJECTION, POWDER, FOR SOLUTION INTRAMUSCULAR; INTRAVENOUS at 02:02

## 2018-02-07 RX ADMIN — HEPARIN SODIUM 5000 UNITS: 5000 INJECTION, SOLUTION INTRAVENOUS; SUBCUTANEOUS at 09:02

## 2018-02-07 RX ADMIN — LEVOTHYROXINE SODIUM 75 MCG: 75 TABLET ORAL at 05:02

## 2018-02-07 NOTE — TRANSFER OF CARE
"Anesthesia Transfer of Care Note    Patient: Jhonny Diana    Procedure(s) Performed: Procedure(s) (LRB):  EXPLORATORY LAPAROSCOPY (N/A)    Patient location: PACU    Anesthesia Type: general    Transport from OR: Transported from OR on 100% O2 by closed face mask with adequate spontaneous ventilation    Post pain: adequate analgesia    Post assessment: no apparent anesthetic complications and tolerated procedure well    Post vital signs: stable    Level of consciousness: awake, alert and oriented    Nausea/Vomiting: no nausea/vomiting    Complications: none    Transfer of care protocol was followed      Last vitals:   Visit Vitals  /69   Pulse 88   Temp 36.6 °C (97.9 °F) (Temporal)   Resp 18   Ht 5' 6" (1.676 m)   Wt 69 kg (152 lb 1.9 oz)   SpO2 100%   BMI 24.55 kg/m²     "

## 2018-02-07 NOTE — NURSING TRANSFER
Nursing Transfer Note      2/7/2018     Transfer To: To 8086 From PACU    Transfer via stretcher    Transfer with none    Transported by PCT    Medicines sent: none    Chart send with patient:yes    Notified: mother    Patient reassessed at: 2/7/18 @ 1640     Upon arrival to floor:

## 2018-02-07 NOTE — OP NOTE
[facility]  Surgery Department  Operative Note    SUMMARY     Patient: Jhonny Diana  Date: 2/7/2018  MRN: [id number]    Date of Procedure: 2/7/2018     Procedure: Procedure(s) (LRB):  EXPLORATORY LAPAROSCOPY (N/A)     Surgeon(s) and Role:     * Solis Chapa MD - Primary     * Imer Culp MD - Resident - Assisting    Pre-Operative Diagnosis: Abdominal pain, unspecified abdominal location [R10.9]    Post-Operative Diagnosis: Post-Op Diagnosis Codes:     * Abdominal pain, unspecified abdominal location [R10.9]    Anesthesia: General    Indications for Procedure:   Jhonny Diana is a 27 y.o.  male with Hx of liver transplant with persistent white count and fluid loculations, for which we recommended diagnostic laparoscopy and possible drain placement. He agreed and provided written consent.     Procedure in Detail:   After consent was obtained, the patient was taken to the operating room and general anesthesia was initiated successfully. The patient was positioned supine. The abdomen was prepped and draped in the normal sterile fashion. Pre-operative antibiotics were administered. Time out was performed and all present were in agreement. We began the procedure by making a supraumbilical incision and dissecting down through the fascia, once in the peritoneum we placed a 12mm balloon trochar and then began insuflating to 15mmHg. The camera was then passed into the abdomen and we performed a diagnostic laparoscopy, firstly assuring to have not injured the bowel on entry, which we did not. We found turbid dark appearing fluid in the right lower quadrant, as well as an encapsulated liver surrounded by adhesions, as well as walled off right and lower quadrant. We did note some free loops of bowel without adhesions to the anterior abdominal wall. We then placed a 5mm trochar along the right mid clavicular lateral to and somewhat superior to the umbilical port. We proceeded to suction out the fluid in the  right lower quadrant and send it for culture and bilirubin. We then passed easily with blunt dissection using the suction  through some thin membranous material in the RLQ into a loculated area that did not drain any significant amount of fluid. At this point using blunt dissection with the suction  we entered the area lateral to the liver on the right and entered a loculated area, however there was again no significant fluid encountered. At this time we irrigated the abdomen, and then placed a drain through the existing 5mm trochar site, sliding it into the RLQ area where we had passed through the thin membrane that had formed. We then removed the 5mm trochar under direct visualization, then removed the camera, and released insuflation, and lastly removed the 12mm trochar. We allowed excess effluent and air to escape via the umbilical port. We sutured the fascia at the umbilicus closed using a 0 vicryl on a UR-6 in a figure of eight fashion. We sutured the drain in place with a nylon suture.     All counts were reported as correct. The procedure was completed.    The patient was aroused from general anesthesia and the endotracheal tube was removed in the operating room. The patient was brought to the recovery room in stable condition. Dr. Chapa was present and scrubbed for all key portions of the case.     Drains: Right abdomen, yeimi drain    Estimated Blood Loss (EBL): Minimal    Complications: No    Specimens:   Specimen (12h ago through future)    None          Condition: Good    Disposition: PACU - hemodynamically stable.    Attestation: Dr. Chapa was present and scrubbed for the all key portions of the procedure.      Imer Culp MD PGY III  [phone]

## 2018-02-07 NOTE — BRIEF OP NOTE
[facility]  Brief Operative Note    SUMMARY     Surgery Date: 2/7/2018     Surgeon(s) and Role:     * Solis Chapa MD - Primary     * Imer Culp MD - Resident - Assisting        Pre-op Diagnosis:  Abdominal pain, unspecified abdominal location [R10.9]    Post-op Diagnosis:  Post-Op Diagnosis Codes:     * Abdominal pain, unspecified abdominal location [R10.9]    Procedure(s) (LRB):  EXPLORATORY LAPAROSCOPY (N/A)    Anesthesia: General    Description of Procedure: Diagnostic laparoscopy, drain placement    Description of the findings of the procedure: Turbid dark appearing fluid in pelvis, loculated area around kidney entered without significant return of fluid, however do not believe to have completely entered fluid collection in question. Patient tolerated procedure. Fluid sent for bilirubin and culture.     Estimated Blood Loss: * No values recorded between 2/7/2018  2:29 PM and 2/7/2018  3:12 PM *         Specimens:   Specimen (12h ago through future)    None

## 2018-02-07 NOTE — PROGRESS NOTES
[facility]  Infectious Disease  Progress Note    Patient Name: Jhonny Diana  MRN: [id number]  Admission Date: 1/11/2018  Length of Stay: 26 days  Attending Physician: Nathanael Medina MD  Primary Care Provider: Primary Doctor No    Isolation Status: No active isolations  Assessment/Plan:      Fever      27-year-old male with a history of alcoholic cirrhosis s/p DDLT 10/19/2017, CMV D+/R+, steroid induction, on cyclosporine c/b multiple hospitalizations for culture negative peritonitis, now with recurrent fevers and persistent abdominal pain. Recurrent fever started in setting of recent ERCP concerning for bacteremia associated with procedure. However, patient with persistent abdominal pain and imaging notable for multiple loculated fluid collections doing well s/p ERCP now again with low grade fever of 100.8 and mild CMV reactivation       - continue valganciclovir for CMV reactivation  - continue zosyn for now  - f/u washout in AM            Anticipated Disposition: pending    Thank you for your consult. I will follow-up with patient. Please contact us if you have any additional questions.    Santos Osborn MD  Infectious Disease  [facility]    Subjective:     Principal Problem:Fever    HPI: No notes on file  Interval History: afebrile, still with diffuse abdominal pain    Review of Systems   Constitutional: Negative for activity change, appetite change, chills, fatigue and fever.   HENT: Negative for congestion, dental problem, mouth sores and sinus pressure.    Eyes: Negative for pain, redness and visual disturbance.   Respiratory: Negative for cough, shortness of breath and wheezing.    Cardiovascular: Negative for chest pain and leg swelling.   Gastrointestinal: Positive for abdominal pain. Negative for abdominal distention, diarrhea, nausea and vomiting.   Endocrine: Negative for polyuria.   Genitourinary: Negative for decreased urine volume, dysuria and frequency.    Musculoskeletal: Negative for joint swelling.   Skin: Negative for color change.   Allergic/Immunologic: Negative for food allergies.   Neurological: Negative for dizziness, weakness and headaches.   Hematological: Negative for adenopathy.   Psychiatric/Behavioral: Negative for agitation and confusion. The patient is not nervous/anxious.      Objective:     Vital Signs (Most Recent):  Temp: 99.1 °F (37.3 °C) (02/06/18 1933)  Pulse: 97 (02/06/18 1933)  Resp: 18 (02/06/18 1933)  BP: 134/78 (02/06/18 1933)  SpO2: 96 % (02/06/18 1933) Vital Signs (24h Range):  Temp:  [98.6 °F (37 °C)-100.3 °F (37.9 °C)] 99.1 °F (37.3 °C)  Pulse:  [] 97  Resp:  [16-18] 18  SpO2:  [94 %-98 %] 96 %  BP: (114-134)/(60-78) 134/78     Weight: 68.3 kg (150 lb 9.2 oz)  Body mass index is 24.3 kg/m².    Estimated Creatinine Clearance: 26.8 mL/min (based on SCr of 3.7 mg/dL (H)).    Physical Exam   Constitutional: He is oriented to person, place, and time. He appears well-developed and well-nourished.   HENT:   Head: Normocephalic and atraumatic.   Mouth/Throat: Oropharynx is clear and moist.   Eyes: Conjunctivae are normal.   Neck: Neck supple.   Cardiovascular: Normal rate, regular rhythm and normal heart sounds.    No murmur heard.  Pulmonary/Chest: Effort normal and breath sounds normal. No respiratory distress. He has no wheezes.   Abdominal: Soft. Bowel sounds are normal. He exhibits no distension. There is no tenderness.   Musculoskeletal: Normal range of motion. He exhibits no edema or tenderness.   Lymphadenopathy:     He has no cervical adenopathy.   Neurological: He is alert and oriented to person, place, and time. Coordination normal.   Skin: Skin is warm and dry. No rash noted.   Psychiatric: He has a normal mood and affect. His behavior is normal.       Significant Labs:   CBC:     Recent Labs  Lab 02/05/18  0500 02/05/18  0853 02/06/18  0530   WBC 2.98*  --  2.93*   HGB 6.7* 7.1* 6.8*   HCT 20.6* 21.8* 21.2*   *  --   156     CMP:     Recent Labs  Lab 02/05/18  0500 02/06/18  0530   * 136   K 4.0 3.9   CL 98 102   CO2 22* 23   GLU 73 91   BUN 45* 23*   CREATININE 5.5* 3.7*   CALCIUM 9.5 9.1   PROT 5.3* 5.5*   ALBUMIN 1.7* 1.8*   BILITOT 2.5* 2.3*   ALKPHOS 99 105   AST 16 21   ALT 5* 5*   ANIONGAP 13 11   EGFRNONAA 13.0* 21.0*       Significant Imaging: I have reviewed all pertinent imaging results/findings within the past 24 hours.     Micro  2/1 RVP pending  1/31 fluid cx NGTD  1/30 bcx NGTD  2/1 asp ag negative  2/1 BDG negative  2/1 CMV quant 370 IU/ml  1/31 pleural fluid cx NGTd, 130 WBCs 34% segs

## 2018-02-07 NOTE — NURSING
Maintenance HD treatment initiated via Right CVC which aspirated and flushed easily, lines secured

## 2018-02-07 NOTE — ASSESSMENT & PLAN NOTE
27-year-old male with a history of alcoholic cirrhosis s/p DDLT 10/19/2017, CMV D+/R+, steroid induction, on cyclosporine c/b multiple hospitalizations for culture negative peritonitis, now with recurrent fevers and persistent abdominal pain. Recurrent fever started in setting of recent ERCP concerning for bacteremia associated with procedure. However, patient with persistent abdominal pain and imaging notable for multiple loculated fluid collections doing well s/p ERCP now again with low grade fever of 100.8 and mild CMV reactivation       - continue valganciclovir for CMV reactivation  - continue zosyn for now  - f/u washout in AM

## 2018-02-07 NOTE — ASSESSMENT & PLAN NOTE
- Pt afebrile now overnight.   - Started on vanc/cefepime for ERCP. Now transitioned to vanc/zosyn   - Blood cultures sent, prelim NGTD.   - thoracentesis 2/1 - fluid negative for infection  - atelectasis noted on ct scan - start breathing tx & CPT  - now cmv positive - started treatment 2/5/18  - plan to take to OR for exp lap this afternoon

## 2018-02-07 NOTE — PROGRESS NOTES
EZEKIEL met with pts mom today at bedside, pt was way at HD.   EZEKIEL provided pts mom with a Medicare and kidney transplant or kidney dialysis guide to read over for pt to determine if he wants to apply for medicare benefits, since he is no deemed chronic need for HD.   SW will return tomorrow to have pt sign the form [id number] deeming him with chronic need for HD.   EZEKIEL will then have the dialysis unit, [facility] file the form for patient.    Pts mom with no questions or psychosocial concerns at this time.   EZEKIEL remains available for all transplant resources, education and psychosocial support.

## 2018-02-07 NOTE — PLAN OF CARE
Problem: Patient Care Overview  Goal: Plan of Care Review  Outcome: Ongoing (interventions implemented as appropriate)  HD 3 hours, 2 liters Uf removed and tolerated. Right CVC flushed and locked with Heparin 1000 units/ml to fill each lumen, capped. Procrit given with tx.

## 2018-02-07 NOTE — SUBJECTIVE & OBJECTIVE
Interval History: afebrile, still with diffuse abdominal pain    Review of Systems   Constitutional: Negative for activity change, appetite change, chills, fatigue and fever.   HENT: Negative for congestion, dental problem, mouth sores and sinus pressure.    Eyes: Negative for pain, redness and visual disturbance.   Respiratory: Negative for cough, shortness of breath and wheezing.    Cardiovascular: Negative for chest pain and leg swelling.   Gastrointestinal: Positive for abdominal pain. Negative for abdominal distention, diarrhea, nausea and vomiting.   Endocrine: Negative for polyuria.   Genitourinary: Negative for decreased urine volume, dysuria and frequency.   Musculoskeletal: Negative for joint swelling.   Skin: Negative for color change.   Allergic/Immunologic: Negative for food allergies.   Neurological: Negative for dizziness, weakness and headaches.   Hematological: Negative for adenopathy.   Psychiatric/Behavioral: Negative for agitation and confusion. The patient is not nervous/anxious.      Objective:     Vital Signs (Most Recent):  Temp: 99.1 °F (37.3 °C) (02/06/18 1933)  Pulse: 97 (02/06/18 1933)  Resp: 18 (02/06/18 1933)  BP: 134/78 (02/06/18 1933)  SpO2: 96 % (02/06/18 1933) Vital Signs (24h Range):  Temp:  [98.6 °F (37 °C)-100.3 °F (37.9 °C)] 99.1 °F (37.3 °C)  Pulse:  [] 97  Resp:  [16-18] 18  SpO2:  [94 %-98 %] 96 %  BP: (114-134)/(60-78) 134/78     Weight: 68.3 kg (150 lb 9.2 oz)  Body mass index is 24.3 kg/m².    Estimated Creatinine Clearance: 26.8 mL/min (based on SCr of 3.7 mg/dL (H)).    Physical Exam   Constitutional: He is oriented to person, place, and time. He appears well-developed and well-nourished.   HENT:   Head: Normocephalic and atraumatic.   Mouth/Throat: Oropharynx is clear and moist.   Eyes: Conjunctivae are normal.   Neck: Neck supple.   Cardiovascular: Normal rate, regular rhythm and normal heart sounds.    No murmur heard.  Pulmonary/Chest: Effort normal and breath  sounds normal. No respiratory distress. He has no wheezes.   Abdominal: Soft. Bowel sounds are normal. He exhibits no distension. There is no tenderness.   Musculoskeletal: Normal range of motion. He exhibits no edema or tenderness.   Lymphadenopathy:     He has no cervical adenopathy.   Neurological: He is alert and oriented to person, place, and time. Coordination normal.   Skin: Skin is warm and dry. No rash noted.   Psychiatric: He has a normal mood and affect. His behavior is normal.       Significant Labs:   CBC:     Recent Labs  Lab 02/05/18  0500 02/05/18  0853 02/06/18  0530   WBC 2.98*  --  2.93*   HGB 6.7* 7.1* 6.8*   HCT 20.6* 21.8* 21.2*   *  --  156     CMP:     Recent Labs  Lab 02/05/18  0500 02/06/18  0530   * 136   K 4.0 3.9   CL 98 102   CO2 22* 23   GLU 73 91   BUN 45* 23*   CREATININE 5.5* 3.7*   CALCIUM 9.5 9.1   PROT 5.3* 5.5*   ALBUMIN 1.7* 1.8*   BILITOT 2.5* 2.3*   ALKPHOS 99 105   AST 16 21   ALT 5* 5*   ANIONGAP 13 11   EGFRNONAA 13.0* 21.0*       Significant Imaging: I have reviewed all pertinent imaging results/findings within the past 24 hours.     Micro  2/1 RVP pending  1/31 fluid cx NGTD  1/30 bcx NGTD  2/1 asp ag negative  2/1 BDG negative  2/1 CMV quant 370 IU/ml  1/31 pleural fluid cx NGTd, 130 WBCs 34% segs

## 2018-02-07 NOTE — ASSESSMENT & PLAN NOTE
- Continue daily CBC.  - Previously transfused 1/30, 2/6, continue procrit  - Given 1 U PRBC yesterday, H/H stable

## 2018-02-07 NOTE — SUBJECTIVE & OBJECTIVE
Scheduled Meds:   albuterol-ipratropium 2.5mg-0.5mg/3mL  3 mL Nebulization Q4H WAKE    atovaquone  1,500 mg Oral Daily    cycloSPORINE  175 mg Oral BID    docusate sodium  100 mg Oral BID    epoetin maurisio (PROCRIT) injection  10,000 Units Intravenous Every Mon, Wed, Fri    heparin (porcine)  5,000 Units Subcutaneous Q8H    custom IVPB builder   Intravenous Q24H    levetiracetam oral soln  500 mg Oral BID    levothyroxine  75 mcg Oral Before breakfast    metoclopramide HCl  10 mg Oral QID (AC & HS)    omeprazole  40 mg Oral QAM    custom IVPB builder  2.25 g Intravenous Q12H    polyethylene glycol  17 g Oral BID    psyllium husk (aspartame)  3.4 g Oral BID    ursodiol  300 mg Oral TID    valganciclovir 50 mg/ml  200 mg Oral Every Mon, Wed, Fri     Continuous Infusions:  PRN Meds:sodium chloride, sodium chloride, sodium chloride, sodium chloride 0.9%, acetaminophen, bisacodyl, heparin (porcine), omnipaque, ondansetron, ondansetron, oxyCODONE, prochlorperazine, simethicone, sodium chloride 0.9%    Review of Systems   Constitutional: Positive for activity change, appetite change, fatigue and fever. Negative for chills.   HENT: Negative.  Negative for congestion and facial swelling.    Eyes: Negative for pain, discharge and visual disturbance.   Respiratory: Negative for cough, shortness of breath and wheezing.    Cardiovascular: Negative for chest pain and leg swelling.   Gastrointestinal: Positive for abdominal distention, abdominal pain, constipation and nausea. Negative for vomiting.   Genitourinary: Positive for decreased urine volume. Negative for difficulty urinating.        Anuric   Musculoskeletal: Negative for arthralgias, back pain and myalgias.   Skin: Positive for color change and wound. Negative for rash.   Allergic/Immunologic: Positive for immunocompromised state.   Neurological: Negative for dizziness, seizures, weakness and headaches.   Psychiatric/Behavioral: Positive for decreased  concentration and dysphoric mood. Negative for confusion. The patient is not nervous/anxious.    All other systems reviewed and are negative.    Objective:     Vital Signs (Most Recent):  Temp: 98.3 °F (36.8 °C) (02/07/18 1238)  Pulse: 93 (02/07/18 1238)  Resp: 20 (02/07/18 1238)  BP: 124/73 (02/07/18 1238)  SpO2: 97 % (02/07/18 1238) Vital Signs (24h Range):  Temp:  [98.3 °F (36.8 °C)-99.6 °F (37.6 °C)] 98.3 °F (36.8 °C)  Pulse:  [78-97] 93  Resp:  [18-20] 20  SpO2:  [95 %-98 %] 97 %  BP: (118-134)/(59-80) 124/73     Weight: 69 kg (152 lb 1.9 oz)  Body mass index is 24.55 kg/m².    Intake/Output - Last 3 Shifts       02/05 0700 - 02/06 0659 02/06 0700 - 02/07 0659 02/07 0700 - 02/08 0659    P.O. 990 900     Blood  236     Other 600  600    NG/GT  675     IV Piggyback 350 350     Total Intake(mL/kg) 1940 (28.4) 2161 (31.3) 600 (8.7)    Urine (mL/kg/hr) 0 (0)      Other 2100 (1.3)  2600 (6.2)    Stool 0 (0)      Total Output 2100   2600    Net -160 +2161 -2000           Urine Occurrence 0 x 0 x     Stool Occurrence 2 x 1 x           Physical Exam   Constitutional: He is oriented to person, place, and time. He appears well-developed. No distress.   Temporal and distal extremity muscle wasting   HENT:   Head: Normocephalic and atraumatic.   Mouth/Throat: No oropharyngeal exudate.   dylan tube   Eyes: EOM are normal. Pupils are equal, round, and reactive to light. Scleral icterus is present.   Neck: Normal range of motion. Neck supple. No JVD present. No thyromegaly present.   Cardiovascular: Normal rate, regular rhythm, normal heart sounds and intact distal pulses.    No murmur heard.  Pulmonary/Chest: Effort normal. No respiratory distress. He has decreased breath sounds in the right middle field, the right lower field and the left lower field. He has no wheezes. He exhibits no tenderness.   Diminished to RLL   Abdominal: Soft. Bowel sounds are normal. He exhibits ascites. He exhibits no distension. There is tenderness  (mild). There is no rebound and no guarding.   Dressing to chevron.  Wd vac removed 1/12/18  Dependent edema present R flank; surrounding erythema   Musculoskeletal: Normal range of motion. He exhibits edema (2+ LE edema). He exhibits no tenderness.   Neurological: He is alert and oriented to person, place, and time. He has normal reflexes.   Skin: Skin is warm and dry. Capillary refill takes 2 to 3 seconds. He is not diaphoretic. No erythema.   jaundice   Psychiatric: He has a normal mood and affect. His behavior is normal. Judgment and thought content normal. His mood appears not anxious.   Depressed mood   Nursing note and vitals reviewed.      Laboratory:  Immunosuppressants         Stop Route Frequency     cycloSPORINE (NEORAL) 100 mg/mL microemulsion solution 175 mg      -- Oral 2 times daily        CBC:   Recent Labs  Lab 02/07/18  0530   WBC 3.26*   RBC 2.49*   HGB 7.8*   HCT 23.5*      MCV 94   MCH 31.3*   MCHC 33.2     CMP:   Recent Labs  Lab 02/07/18  0530   GLU 66*   CALCIUM 9.4   ALBUMIN 1.8*   PROT 5.4*      K 4.1   CO2 22*      BUN 28*   CREATININE 4.7*   ALKPHOS 101   ALT 5*   AST 17   BILITOT 2.3*     Labs within the past 24 hours have been reviewed.    Diagnostic Results:  I have personally reviewed all pertinent imaging studies.

## 2018-02-07 NOTE — PROGRESS NOTES
[facility]  Liver Transplant  Progress Note    Patient Name: Jhonny Diana  MRN: [id number]  Admission Date: 2018  Hospital Length of Stay: 27 days  Code Status: Full Code  Primary Care Provider: Primary Doctor No  Post-Operative Day: 111    ORGAN:   LIVER  Disease Etiology: Acute Alcoholic Hepatitis  Donor Type:    - Brain Death  CDC High Risk:   No  Donor CMV Status:   Donor CMV Status: Positive  Donor HBcAB:   Negative  Donor HCV Status:   Negative  Whole or Partial: Whole Liver  Biliary Anastomosis: End to End  Arterial Anatomy: Standard  Subjective:     History of Present Illness:  Jhonny Diana is a 28 y/o male with past medical history of alcoholic cirrhosis.  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 Found to have peritonitis in November (WBC 5000, 75% PNM) neg for bile leak. He was treated initially with vanc/cefepime. Repeat cell counts  with some improvement (WBC 1400, 45% PNM). He has undergone multiple paracenteses as well as abscess drainage of perihepatic fluid collections and treated with antimicrobial therapy but no positive cultures. Of note, biliary stricture also identified and ERCP performed on 2017 with sphincterotomy and biliary stent placed. Liver tests still have not normalized despite intervention, bilirubin and AP remain elevated. Other pertinent PMH current wound vac in place 2/2 wound infection, malnutrition requiring TPN for short course and ongoing hypoalbuminemia, and seizure activity while on prograf and has since been switched to cyclosporine without reoccurrence.  He presented to the ER for fever, abdominal pain, and N/V. He reports fever (103) for 1  day prior. Overnight, he developed N/V, reports small amount of green emesis with new left sided pain. He also endorses worsening SOB with exertion. He was scheduled as an outpatient for follow up paracentesis and IR drainage of fluid collection. CXR in ER shows large pleural effusion with subsegmental atelectasis. Infectious work up initiated in ER. His ANC is 900. Broad spectrum antibiotics initiated in ED. He denies chest pain, palpitations, diarrhea, constipation, or back pain. Denies any sick contacts.    Hospital Course:  Thoracentesis (1.2L off), Paracentesis (1.6L off), and IR drainage of fluid collection 1/11, all fluids negative for infection. ID consulted. Broad spectrum antibiotics d/c'd 1/15. Chronically malnourished with poor PO intake, prealbumin 7. Shane tube placed 1/15 for tube feedings.     ERCP 1/17 with sludge and a biliary stone which was removed and stent exchanged.  Remained with n/v; therefore, TF remained on hold and TPN continued.   Pt with fever s/p ERCP on 1/17 which continued until 1/19.  Vanc/cefepime restarted.  Blood cx 1/17 and 1/18 NGTD.  ID reconsulted.  Fungal markers sent.  CT C/A/P obtained.  With large R pleural effusion and ascites- both drained 1/19 and negative for infx per cell count. Pt afebrile 1/20. Liver biopsy 1/23 - without rejection.  EGD 1/25 - unremarkable for source of GI symptoms.  Resume diet along with other GI recommendations. SHANE tube placed for tube feeds.       Interval History:  Pt with some improvement in fevers overnight. Remains on vanc and zosyn. Plan to take back to OR this afternoon to assess for source. Pt went for HD this morning. In addition he received 1 U PRBC yest, H/H stable.     Scheduled Meds:   albuterol-ipratropium 2.5mg-0.5mg/3mL  3 mL Nebulization Q4H WAKE    atovaquone  1,500 mg Oral Daily    cycloSPORINE  175 mg Oral BID    docusate sodium  100 mg Oral BID    epoetin maurisio (PROCRIT) injection  10,000 Units Intravenous Every Mon,  Wed, Fri    heparin (porcine)  5,000 Units Subcutaneous Q8H    custom IVPB builder   Intravenous Q24H    levetiracetam oral soln  500 mg Oral BID    levothyroxine  75 mcg Oral Before breakfast    metoclopramide HCl  10 mg Oral QID (AC & HS)    omeprazole  40 mg Oral QAM    custom IVPB builder  2.25 g Intravenous Q12H    polyethylene glycol  17 g Oral BID    psyllium husk (aspartame)  3.4 g Oral BID    ursodiol  300 mg Oral TID    valganciclovir 50 mg/ml  200 mg Oral Every Mon, Wed, Fri     Continuous Infusions:  PRN Meds:sodium chloride, sodium chloride, sodium chloride, sodium chloride 0.9%, acetaminophen, bisacodyl, heparin (porcine), omnipaque, ondansetron, ondansetron, oxyCODONE, prochlorperazine, simethicone, sodium chloride 0.9%    Review of Systems   Constitutional: Positive for activity change, appetite change, fatigue and fever. Negative for chills.   HENT: Negative.  Negative for congestion and facial swelling.    Eyes: Negative for pain, discharge and visual disturbance.   Respiratory: Negative for cough, shortness of breath and wheezing.    Cardiovascular: Negative for chest pain and leg swelling.   Gastrointestinal: Positive for abdominal distention, abdominal pain, constipation and nausea. Negative for vomiting.   Genitourinary: Positive for decreased urine volume. Negative for difficulty urinating.        Anuric   Musculoskeletal: Negative for arthralgias, back pain and myalgias.   Skin: Positive for color change and wound. Negative for rash.   Allergic/Immunologic: Positive for immunocompromised state.   Neurological: Negative for dizziness, seizures, weakness and headaches.   Psychiatric/Behavioral: Positive for decreased concentration and dysphoric mood. Negative for confusion. The patient is not nervous/anxious.    All other systems reviewed and are negative.    Objective:     Vital Signs (Most Recent):  Temp: 98.3 °F (36.8 °C) (02/07/18 1238)  Pulse: 93 (02/07/18 1238)  Resp: 20  (02/07/18 1238)  BP: 124/73 (02/07/18 1238)  SpO2: 97 % (02/07/18 1238) Vital Signs (24h Range):  Temp:  [98.3 °F (36.8 °C)-99.6 °F (37.6 °C)] 98.3 °F (36.8 °C)  Pulse:  [78-97] 93  Resp:  [18-20] 20  SpO2:  [95 %-98 %] 97 %  BP: (118-134)/(59-80) 124/73     Weight: 69 kg (152 lb 1.9 oz)  Body mass index is 24.55 kg/m².    Intake/Output - Last 3 Shifts       02/05 0700 - 02/06 0659 02/06 0700 - 02/07 0659 02/07 0700 - 02/08 0659    P.O. 990 900     Blood  236     Other 600  600    NG/GT  675     IV Piggyback 350 350     Total Intake(mL/kg) 1940 (28.4) 2161 (31.3) 600 (8.7)    Urine (mL/kg/hr) 0 (0)      Other 2100 (1.3)  2600 (6.2)    Stool 0 (0)      Total Output 2100   2600    Net -160 +2161 -2000           Urine Occurrence 0 x 0 x     Stool Occurrence 2 x 1 x           Physical Exam   Constitutional: He is oriented to person, place, and time. He appears well-developed. No distress.   Temporal and distal extremity muscle wasting   HENT:   Head: Normocephalic and atraumatic.   Mouth/Throat: No oropharyngeal exudate.   dylan tube   Eyes: EOM are normal. Pupils are equal, round, and reactive to light. Scleral icterus is present.   Neck: Normal range of motion. Neck supple. No JVD present. No thyromegaly present.   Cardiovascular: Normal rate, regular rhythm, normal heart sounds and intact distal pulses.    No murmur heard.  Pulmonary/Chest: Effort normal. No respiratory distress. He has decreased breath sounds in the right middle field, the right lower field and the left lower field. He has no wheezes. He exhibits no tenderness.   Diminished to RLL   Abdominal: Soft. Bowel sounds are normal. He exhibits ascites. He exhibits no distension. There is tenderness (mild). There is no rebound and no guarding.   Dressing to chevron.  Wd vac removed 1/12/18  Dependent edema present R flank; surrounding erythema   Musculoskeletal: Normal range of motion. He exhibits edema (2+ LE edema). He exhibits no tenderness.   Neurological:  He is alert and oriented to person, place, and time. He has normal reflexes.   Skin: Skin is warm and dry. Capillary refill takes 2 to 3 seconds. He is not diaphoretic. No erythema.   jaundice   Psychiatric: He has a normal mood and affect. His behavior is normal. Judgment and thought content normal. His mood appears not anxious.   Depressed mood   Nursing note and vitals reviewed.      Laboratory:  Immunosuppressants         Stop Route Frequency     cycloSPORINE (NEORAL) 100 mg/mL microemulsion solution 175 mg      -- Oral 2 times daily        CBC:   Recent Labs  Lab 02/07/18  0530   WBC 3.26*   RBC 2.49*   HGB 7.8*   HCT 23.5*      MCV 94   MCH 31.3*   MCHC 33.2     CMP:   Recent Labs  Lab 02/07/18  0530   GLU 66*   CALCIUM 9.4   ALBUMIN 1.8*   PROT 5.4*      K 4.1   CO2 22*      BUN 28*   CREATININE 4.7*   ALKPHOS 101   ALT 5*   AST 17   BILITOT 2.3*     Labs within the past 24 hours have been reviewed.    Diagnostic Results:  I have personally reviewed all pertinent imaging studies.    Assessment/Plan:     Fever    - Pt afebrile now overnight.   - Started on vanc/cefepime for ERCP. Now transitioned to vanc/zosyn   - Blood cultures sent, prelim NGTD.   - thoracentesis 2/1 - fluid negative for infection  - atelectasis noted on ct scan - start breathing tx & CPT  - now cmv positive - started treatment 2/5/18  - plan to take to OR for exp lap this afternoon        Recurrent pleural effusion on right    - CXR in ER with large right pleural effusion with subjective c/o worsening SOB  - thoracentesis completed- 1200 ml removed.  SOB with significant improvement.   - Cell count reviewed and negative for infection.   - resp even and non labored.  O2 sat 94-98%.  - Repeat thora 1/19 negative for infection per cell count  - Reaccumulating fluid on xray 1/21.  - thoracentesis 1/31 - 1.4L removed, negative for infection        Biliary stricture of transplanted liver    - tbili with increase prompting ERCP  1/30 with stones and sludge, stents placed  - T bili now improving. Trend daily.         Other ascites    - Paracentesis performed 1/11/18 with 1600 ml removed.    - fluid negative for infection.   - Repeat para 1/19 negative for infection per cell count  - no fluid seen for paracentesis on 1/23.        Acute renal failure with tubular necrosis    - HD resumed on previous admission. Now anuric and dialyzes M-W-F.  - Nephrology following.    - HD Sat since held Friday for thora/para.  - HD transitioned to TTS schedule.  - ktm consulted for possible kidney transplant         Constipation    - Enema ordered 1/20 and 1/21 with BM  - Encourage ambulation.  - decrease narcotics  - d/c metamucil and miralax bid as makes patient nauseated  - increase reglan qid            Liver transplanted    - Post op course complicated by fevers and hyperbilirubinemia.  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  - AST/ALT normal. Tbili/alkphos remain elevated.  - Liver US 1/8 showed 3.9 cm complex fluid collection anterior to right lobe and moderate nonspecific complex ascites inferior to transplant.   - IR placed drain 1/11/18, cell count negative for infection.  - PBS consulted. PBS not comfortable proceeding with EUS with liver biopsy given fever and possible infectious process going on. ERCP 1/17 with stone and sludge. Pt placed on Actigall - treated with 10 day course of abx.  ID now signed off.  - Bili elevated - liver u/s 1/28  - Liver biopsy 1/23 without rejection.  - consulted PBS for ERCP - Choledocholithiasis was found along with sludge, stents placed 1/30  - T bili now improving.             Nausea and vomiting    - h/o constipation, reports having regular BM's with bowel regimen  - vomiting episode x 1 at home. Anti-emetics ordered PRN.  - KUB repeated 1/14/18 given increased abdominal pain- mild, generalized bowel distention suggesting ileus.  Diet changed to clears as tolerated for bowel rest   -  SHANE tube placed 1/16 for tube feeds. Not tolerating tube feeds, stopped 1/17/18.  - D/C shane 1/19, now in place as of 1/25.  - chronic constipation, Cont bowel regimen.   - GI consulted. See protein calorie malnutrition          Anemia of chronic disease    - Continue daily CBC.  - Previously transfused 1/30, 2/6, continue procrit  - Given 1 U PRBC yesterday, H/H stable        Long-term use of immunosuppressant medication    - Maintenance IS with cyclosporine. MMF on hold for infections and neutropenia. Continue to check cyclosporine level daily. Assess for toxicity and adjust level as needed.        Severe protein-calorie malnutrition    - Poor PO intake since transplant requiring short course of TPN during previous hospital stay.   - albumin remains decreased but appetite slowly improving per pt.   - Dietary consulted. Will need to closely monitor PO intake.   - supplements also ordered.   - SHANE with tube feeding start 1/16. Stopped 1/17/18, was not tolerating. Shane removed 1/20.  - prealbumin 7 on 1/15.  - TPN started 1/16/18.   - Pt able to eat minimally 1/21 and 1/22 which is an improvement.  - gi consulted - EGD reviewed, increased ppi bid, miralax bid, and metamucil bid, decreased narcotics  - continue reglan, placed SHANE tube with tube feeds          Delayed surgical wound healing    - wd vac removed 1/12/18.  Wound healing well. Aquacel AG and Mepilex border dressing applied via wound care recs.           At risk for opportunistic infections    - CMV detected 2/1 now on tx dose valcyte  - bactrim held.          Prophylactic immunotherapy    - See long term use of immunosuppression.         Other cytomegaloviral diseases    - detected on last CMV PCR.   - case discussed with ID and will hold off on treatment at this time given low WBC.             VTE Risk Mitigation         Ordered     heparin (porcine) injection 1,000 Units  As needed (PRN)     Route:  Intra-Catheter        02/05/18 0948     heparin (porcine)  injection 5,000 Units  Every 8 hours     Route:  Subcutaneous        01/31/18 0957     Medium Risk of VTE  Once      01/11/18 0351     Place sequential compression device  Until discontinued      01/11/18 0351          The patients clinical status was discussed at multidisplinary rounds, involving transplant surgery, transplant medicine, pharmacy, nursing, nutrition, and social work    Discharge Planning:  Monitor kidney function --> may need outpt HD  Monitor HH needs vs rehab for deconditioned status      Jenna M Oppenheimer, PAShahrzadC  Liver Transplant  [facility]

## 2018-02-08 PROBLEM — E87.70 HYPERVOLEMIA: Status: RESOLVED | Noted: 2017-12-08 | Resolved: 2018-02-08

## 2018-02-08 PROBLEM — D72.829 LEUKOCYTOSIS: Status: RESOLVED | Noted: 2017-10-03 | Resolved: 2018-02-08

## 2018-02-08 LAB
ALBUMIN SERPL BCP-MCNC: 1.7 G/DL
ALP SERPL-CCNC: 96 U/L
ALT SERPL W/O P-5'-P-CCNC: <5 U/L
ANION GAP SERPL CALC-SCNC: 12 MMOL/L
AST SERPL-CCNC: 16 U/L
BACTERIA CATH TIP CULT: NO GROWTH
BASOPHILS # BLD AUTO: 0.06 K/UL
BASOPHILS NFR BLD: 1.4 %
BILIRUB SERPL-MCNC: 2.1 MG/DL
BUN SERPL-MCNC: 17 MG/DL
CALCIUM SERPL-MCNC: 8.9 MG/DL
CHLORIDE SERPL-SCNC: 104 MMOL/L
CO2 SERPL-SCNC: 20 MMOL/L
CREAT SERPL-MCNC: 3.4 MG/DL
CYCLOSPORINE BLD LC/MS/MS-MCNC: 221 NG/ML
DIFFERENTIAL METHOD: ABNORMAL
EOSINOPHIL # BLD AUTO: 0.4 K/UL
EOSINOPHIL NFR BLD: 10.1 %
ERYTHROCYTE [DISTWIDTH] IN BLOOD BY AUTOMATED COUNT: 14.6 %
EST. GFR  (AFRICAN AMERICAN): 26.8 ML/MIN/1.73 M^2
EST. GFR  (NON AFRICAN AMERICAN): 23.2 ML/MIN/1.73 M^2
GLUCOSE SERPL-MCNC: 76 MG/DL
HCT VFR BLD AUTO: 25.7 %
HGB BLD-MCNC: 8.2 G/DL
IMM GRANULOCYTES # BLD AUTO: 0.15 K/UL
IMM GRANULOCYTES NFR BLD AUTO: 3.5 %
LYMPHOCYTES # BLD AUTO: 1 K/UL
LYMPHOCYTES NFR BLD: 23.3 %
MAGNESIUM SERPL-MCNC: 1.6 MG/DL
MCH RBC QN AUTO: 30.1 PG
MCHC RBC AUTO-ENTMCNC: 31.9 G/DL
MCV RBC AUTO: 95 FL
MONOCYTES # BLD AUTO: 0.8 K/UL
MONOCYTES NFR BLD: 17.9 %
NEUTROPHILS # BLD AUTO: 1.9 K/UL
NEUTROPHILS NFR BLD: 43.8 %
NRBC BLD-RTO: 0 /100 WBC
PHOSPHATE SERPL-MCNC: 3.1 MG/DL
PLATELET # BLD AUTO: 244 K/UL
PMV BLD AUTO: 10.7 FL
POTASSIUM SERPL-SCNC: 4.2 MMOL/L
PROT SERPL-MCNC: 5.2 G/DL
RBC # BLD AUTO: 2.72 M/UL
SODIUM SERPL-SCNC: 136 MMOL/L
WBC # BLD AUTO: 4.25 K/UL

## 2018-02-08 PROCEDURE — 99233 SBSQ HOSP IP/OBS HIGH 50: CPT | Mod: 24,,, | Performed by: NURSE PRACTITIONER

## 2018-02-08 PROCEDURE — 20600001 HC STEP DOWN PRIVATE ROOM

## 2018-02-08 PROCEDURE — 99233 SBSQ HOSP IP/OBS HIGH 50: CPT | Mod: ,,, | Performed by: INTERNAL MEDICINE

## 2018-02-08 PROCEDURE — 80158 DRUG ASSAY CYCLOSPORINE: CPT

## 2018-02-08 PROCEDURE — 80053 COMPREHEN METABOLIC PANEL: CPT

## 2018-02-08 PROCEDURE — 36415 COLL VENOUS BLD VENIPUNCTURE: CPT

## 2018-02-08 PROCEDURE — 94664 DEMO&/EVAL PT USE INHALER: CPT

## 2018-02-08 PROCEDURE — 63600175 PHARM REV CODE 636 W HCPCS: Performed by: NURSE PRACTITIONER

## 2018-02-08 PROCEDURE — 94640 AIRWAY INHALATION TREATMENT: CPT

## 2018-02-08 PROCEDURE — 25000003 PHARM REV CODE 250: Performed by: NURSE PRACTITIONER

## 2018-02-08 PROCEDURE — 63600175 PHARM REV CODE 636 W HCPCS: Mod: JG | Performed by: SURGERY

## 2018-02-08 PROCEDURE — 25000242 PHARM REV CODE 250 ALT 637 W/ HCPCS: Performed by: NURSE PRACTITIONER

## 2018-02-08 PROCEDURE — 83735 ASSAY OF MAGNESIUM: CPT

## 2018-02-08 PROCEDURE — 25000003 PHARM REV CODE 250: Performed by: SURGERY

## 2018-02-08 PROCEDURE — 84100 ASSAY OF PHOSPHORUS: CPT

## 2018-02-08 PROCEDURE — 85025 COMPLETE CBC W/AUTO DIFF WBC: CPT

## 2018-02-08 PROCEDURE — 63600175 PHARM REV CODE 636 W HCPCS: Performed by: PHYSICIAN ASSISTANT

## 2018-02-08 RX ORDER — SODIUM CHLORIDE 9 MG/ML
INJECTION, SOLUTION INTRAVENOUS ONCE
Status: COMPLETED | OUTPATIENT
Start: 2018-02-08 | End: 2018-02-09

## 2018-02-08 RX ORDER — SODIUM CHLORIDE 9 MG/ML
INJECTION, SOLUTION INTRAVENOUS
Status: DISCONTINUED | OUTPATIENT
Start: 2018-02-08 | End: 2018-02-09

## 2018-02-08 RX ADMIN — LEVETIRACETAM 500 MG: 100 SOLUTION ORAL at 07:02

## 2018-02-08 RX ADMIN — IPRATROPIUM BROMIDE AND ALBUTEROL SULFATE 3 ML: .5; 3 SOLUTION RESPIRATORY (INHALATION) at 08:02

## 2018-02-08 RX ADMIN — ONDANSETRON HYDROCHLORIDE 4 MG: 2 INJECTION, SOLUTION INTRAMUSCULAR; INTRAVENOUS at 09:02

## 2018-02-08 RX ADMIN — METOCLOPRAMIDE HYDROCHLORIDE 10 MG: 5 SOLUTION ORAL at 06:02

## 2018-02-08 RX ADMIN — OXYCODONE HYDROCHLORIDE 10 MG: 5 TABLET ORAL at 12:02

## 2018-02-08 RX ADMIN — HEPARIN SODIUM 5000 UNITS: 5000 INJECTION, SOLUTION INTRAVENOUS; SUBCUTANEOUS at 02:02

## 2018-02-08 RX ADMIN — LEVETIRACETAM 500 MG: 100 SOLUTION ORAL at 10:02

## 2018-02-08 RX ADMIN — OXYCODONE HYDROCHLORIDE 10 MG: 5 TABLET ORAL at 03:02

## 2018-02-08 RX ADMIN — PIPERACILLIN AND TAZOBACTAM 2.25 G: 4; .5 INJECTION, POWDER, LYOPHILIZED, FOR SOLUTION INTRAVENOUS; PARENTERAL at 09:02

## 2018-02-08 RX ADMIN — IPRATROPIUM BROMIDE AND ALBUTEROL SULFATE 3 ML: .5; 3 SOLUTION RESPIRATORY (INHALATION) at 07:02

## 2018-02-08 RX ADMIN — METOCLOPRAMIDE HYDROCHLORIDE 10 MG: 5 SOLUTION ORAL at 10:02

## 2018-02-08 RX ADMIN — URSODIOL 300 MG: 300 CAPSULE ORAL at 05:02

## 2018-02-08 RX ADMIN — CYCLOSPORINE 175 MG: 100 SOLUTION ORAL at 06:02

## 2018-02-08 RX ADMIN — SODIUM CHLORIDE: 9 INJECTION, SOLUTION INTRAVENOUS at 01:02

## 2018-02-08 RX ADMIN — URSODIOL 300 MG: 300 CAPSULE ORAL at 07:02

## 2018-02-08 RX ADMIN — LEVOTHYROXINE SODIUM 75 MCG: 75 TABLET ORAL at 05:02

## 2018-02-08 RX ADMIN — OXYCODONE HYDROCHLORIDE 10 MG: 5 TABLET ORAL at 09:02

## 2018-02-08 RX ADMIN — OXYCODONE HYDROCHLORIDE 10 MG: 5 TABLET ORAL at 07:02

## 2018-02-08 RX ADMIN — ATOVAQUONE 1500 MG: 750 SUSPENSION ORAL at 09:02

## 2018-02-08 RX ADMIN — OMEPRAZOLE 40 MG: 40 CAPSULE, DELAYED RELEASE ORAL at 06:02

## 2018-02-08 RX ADMIN — CYCLOSPORINE 175 MG: 100 SOLUTION ORAL at 09:02

## 2018-02-08 RX ADMIN — OXYCODONE HYDROCHLORIDE 10 MG: 5 TABLET ORAL at 05:02

## 2018-02-08 RX ADMIN — METOCLOPRAMIDE HYDROCHLORIDE 10 MG: 5 SOLUTION ORAL at 05:02

## 2018-02-08 RX ADMIN — METOCLOPRAMIDE HYDROCHLORIDE 10 MG: 5 SOLUTION ORAL at 07:02

## 2018-02-08 RX ADMIN — PIPERACILLIN AND TAZOBACTAM 2.25 G: 4; .5 INJECTION, POWDER, LYOPHILIZED, FOR SOLUTION INTRAVENOUS; PARENTERAL at 10:02

## 2018-02-08 RX ADMIN — URSODIOL 300 MG: 300 CAPSULE ORAL at 03:02

## 2018-02-08 RX ADMIN — HEPARIN SODIUM 5000 UNITS: 5000 INJECTION, SOLUTION INTRAVENOUS; SUBCUTANEOUS at 07:02

## 2018-02-08 NOTE — PLAN OF CARE
Problem: Patient Care Overview  Goal: Plan of Care Review  Outcome: Ongoing (interventions implemented as appropriate)  TF changed to nocturnal from 8pm to 8am. Patient with poor appetite - has only taken in about 1 cup of water today. Patient's mother trying to promote intake. Patient with c/o BUE swelling this afternoon - spoke with CRUZITO Blackwood NP re: above - patient to be dialyzed tomorrow, will instruct patient to elevate arms above heart. RLQ IRIHS with SS drainage. Patient OOB to wheelchair and out of room with mother this afternoon.

## 2018-02-08 NOTE — PLAN OF CARE
Pt is AAOx4; Tmax 99; vital signs stable. He went to HD this morning, 2L removed. He then went to an ex lap. IRISH drain RLQ draining dark brown fluid. Midline incision dressing clean and intact. Mother is at bedside, attentive to pt.

## 2018-02-08 NOTE — ASSESSMENT & PLAN NOTE
- Poor PO intake since transplant requiring short course of TPN during previous hospital stay.   - albumin remains decreased but appetite slowly improving per pt.   - Dietary consulted. Will need to closely monitor PO intake.   - supplements also ordered.   - SHANE with tube feeding start 1/16. Stopped 1/17/18, was not tolerating. Shane removed 1/20.  - prealbumin 7 on 1/15.  - TPN started 1/16/18.   - Pt able to eat minimally 1/21 and 1/22 which is an improvement.  - gi consulted - EGD reviewed, increased ppi bid, miralax bid, and metamucil bid, decreased narcotics  - continue reglan, placed SHANE tube with tube feeds - transitioned to nightly feeds

## 2018-02-08 NOTE — ASSESSMENT & PLAN NOTE
Nutrition Diagnosis  Inadequate oral intake    Related to (etiology):   Decreased appetite, abdominal distention    Signs and Symptoms (as evidenced by):   Pt eating 0% of meals and relying on enteral nutrition for 100% of EPN, EEN     Interventions/Recommendations (treatment strategy):  See recs    Nutrition Diagnosis Status:   Continues

## 2018-02-08 NOTE — PROGRESS NOTES
[facility]  Infectious Disease  Progress Note    Patient Name: Jhonny Diana  MRN: [id number]  Admission Date: 1/11/2018  Length of Stay: 28 days  Attending Physician: Nathanael Medina MD  Primary Care Provider: Primary Doctor No    Isolation Status: No active isolations  Assessment/Plan:      Fever      27-year-old male with a history of alcoholic cirrhosis s/p DDLT 10/19/2017, CMV D+/R+, steroid induction, on cyclosporine c/b multiple hospitalizations for culture negative peritonitis, now with recurrent fevers and persistent abdominal pain. Recurrent fever started in setting of recent ERCP concerning for bacteremia associated with procedure. However, patient with persistent abdominal pain and imaging notable for multiple loculated fluid collections doing well s/p ERCP now again with low grade fever of 100.8 and mild CMV reactivation now s/p laparoscopic drainage of IA collection 2/7       - continue valganciclovir for CMV reactivation  - follow up cultures  - continue zosyn for now and if cultures negative would stop              Anticipated Disposition: pending    Thank you for your consult. I will follow-up with patient. Please contact us if you have any additional questions.    Santos Osborn MD  Infectious Disease  [facility]    Subjective:     Principal Problem:Fever    HPI: No notes on file  Interval History: afebrile,went to OR yesterday laproscopic drainage of collection and breaking loculations, non purulent fluid grossly, c/o abdominal pain, not passing gas yet    Review of Systems   Constitutional: Negative for activity change, appetite change, chills, fatigue and fever.   HENT: Negative for congestion, dental problem, mouth sores and sinus pressure.    Eyes: Negative for pain, redness and visual disturbance.   Respiratory: Negative for cough, shortness of breath and wheezing.    Cardiovascular: Negative for chest pain and leg swelling.   Gastrointestinal: Positive for  abdominal pain. Negative for abdominal distention, diarrhea, nausea and vomiting.   Endocrine: Negative for polyuria.   Genitourinary: Negative for decreased urine volume, dysuria and frequency.   Musculoskeletal: Negative for joint swelling.   Skin: Negative for color change.   Allergic/Immunologic: Negative for food allergies.   Neurological: Negative for dizziness, weakness and headaches.   Hematological: Negative for adenopathy.   Psychiatric/Behavioral: Negative for agitation and confusion. The patient is not nervous/anxious.      Objective:     Vital Signs (Most Recent):  Temp: 100 °F (37.8 °C) (02/08/18 0348)  Pulse: 97 (02/08/18 0348)  Resp: 20 (02/08/18 0348)  BP: 128/75 (02/08/18 0348)  SpO2: 95 % (02/08/18 0348) Vital Signs (24h Range):  Temp:  [97.9 °F (36.6 °C)-100 °F (37.8 °C)] 100 °F (37.8 °C)  Pulse:  [78-99] 97  Resp:  [17-20] 20  SpO2:  [95 %-100 %] 95 %  BP: (121-134)/(59-80) 128/75     Weight: 70 kg (154 lb 5.2 oz)  Body mass index is 24.91 kg/m².    Estimated Creatinine Clearance: 29.2 mL/min (based on SCr of 3.4 mg/dL (H)).    Physical Exam   Constitutional: He is oriented to person, place, and time. He appears well-developed and well-nourished.   HENT:   Head: Normocephalic and atraumatic.   Mouth/Throat: Oropharynx is clear and moist.   Eyes: Conjunctivae are normal.   Neck: Neck supple.   Cardiovascular: Normal rate, regular rhythm and normal heart sounds.    No murmur heard.  Pulmonary/Chest: Effort normal and breath sounds normal. No respiratory distress. He has no wheezes.   Abdominal: Soft. Bowel sounds are normal. He exhibits no distension. There is no tenderness.   Musculoskeletal: Normal range of motion. He exhibits no edema or tenderness.   Lymphadenopathy:     He has no cervical adenopathy.   Neurological: He is alert and oriented to person, place, and time. Coordination normal.   Skin: Skin is warm and dry. No rash noted.   Psychiatric: He has a normal mood and affect. His behavior  is normal.       Significant Labs:   CBC:     Recent Labs  Lab 02/07/18  0530 02/08/18  0528   WBC 3.26* 4.25   HGB 7.8* 8.2*   HCT 23.5* 25.7*    244     CMP:     Recent Labs  Lab 02/07/18  0530 02/08/18  0528    136   K 4.1 4.2    104   CO2 22* 20*   GLU 66* 76   BUN 28* 17   CREATININE 4.7* 3.4*   CALCIUM 9.4 8.9   PROT 5.4* 5.2*   ALBUMIN 1.8* 1.7*   BILITOT 2.3* 2.1*   ALKPHOS 101 96   AST 17 16   ALT 5* <5*   ANIONGAP 12 12   EGFRNONAA 15.7* 23.2*       Significant Imaging: I have reviewed all pertinent imaging results/findings within the past 24 hours.     Micro  2/7 OR lap drainage of IA collection: gram stain negative, cultures NGTD  2/6 CVC cath tip negative  2/1 RVP negative  1/31 fluid cx NGTD  1/30 bcx NGTD  2/1 asp ag negative  2/1 BDG negative  2/1 CMV quant 370 IU/ml  1/31 pleural fluid cx NGTd, 130 WBCs 34% segs

## 2018-02-08 NOTE — PROGRESS NOTES
- Pt had emesis occurrence (volume= 325 mL in blue emesis bag) 30-45 minutes after being given his nighttime meds. Pt was given Keppra 500 mg, Reglan 10 mg, ursodiol 300 mg, and oxycodone 5 mg.  - Notified Shereen Mcguire DNP who gave OK to repeat the 5 mg of oxycodone x 1. Shereen stated not to repeat the others since there is no way to determine how much was absorbed.  - Pt is also on continuous TF at 45 mL/hr. Turned TF off; Shereen stated to hold for 2 hours, check for residual, and restart if appropriate.    Addendum 2330: Checked for residual after 2 hours - none noted - went to resume TF as ordered, but pt refused to let me restart the TF, due to nausea.    Addendum 0015: Shereen Mcguire DNP gave OK to restart TF at half the rate (20 mL/hr instead of 45 mL/hr) if pt is OK with this. Pt agreed to this but asked if we could wait until 0115. Gave pt 10 mg IV Compazine for nausea and will restart TF at 0115.    Addendum 0115: Restarted tube feeding at 20 mL/hr. Will monitor for nausea.    Addendum 0630: TF is back at the ordered rate of 45 mL/hr.

## 2018-02-08 NOTE — ASSESSMENT & PLAN NOTE
- Pt afebrile now overnight.   - Started on vanc/cefepime for ERCP. Now transitioned to vanc/zosyn   - Blood cultures sent, prelim NGTD.   - thoracentesis 2/1 - fluid negative for infection  - atelectasis noted on ct scan - start breathing tx & CPT  - now cmv positive - started treatment 2/5/18  - taken to OR 2/7 for exp lap - fluid collections drained, fluid sent for analysis

## 2018-02-08 NOTE — ANESTHESIA POSTPROCEDURE EVALUATION
"Anesthesia Post Evaluation    Patient: Jhonny Diana    Procedure(s) Performed: Procedure(s) (LRB):  EXPLORATORY LAPAROSCOPY (N/A)    Final Anesthesia Type: general  Patient location during evaluation: PACU  Patient participation: Yes- Able to Participate  Level of consciousness: awake and alert  Post-procedure vital signs: reviewed and stable  Pain management: adequate  Airway patency: patent  PONV status at discharge: No PONV  Anesthetic complications: no      Cardiovascular status: blood pressure returned to baseline  Respiratory status: unassisted  Hydration status: euvolemic  Follow-up not needed.        Visit Vitals  /82 (BP Location: Right arm, Patient Position: Lying)   Pulse 105   Temp 37.2 °C (98.9 °F) (Oral)   Resp 16   Ht 5' 6" (1.676 m)   Wt 70 kg (154 lb 5.2 oz)   SpO2 (!) 94%   BMI 24.91 kg/m²       Pain/Diogenes Score: Pain Assessment Performed: Yes (2/8/2018  8:45 AM)  Presence of Pain: complains of pain/discomfort (2/8/2018  8:45 AM)  Pain Rating Prior to Med Admin: 7 (2/8/2018  9:48 AM)  Pain Rating Post Med Admin: 4 (2/8/2018  6:30 AM)  Diogenes Score: 10 (2/7/2018  4:40 PM)      "

## 2018-02-08 NOTE — PLAN OF CARE
Problem: Patient Care Overview  Goal: Plan of Care Review  Outcome: Ongoing (interventions implemented as appropriate)  - Pt admitted 1/11/18 for nausea, vomiting, and fever; pt is a liver transplant recipient from 10/19/17 for alcoholic hepatitis. Pt also has a referral for a kidney transplant due to ATN/anuric status with HD on a M/W/F schedule.  - Pt went to HD yesterday with 2 L taken off.  - Pt had an ex-lap with washout under general anesthesia yesterday. Per op note, a fluid collection was found but could only be partially cleared out; a IRISH drain was also placed on the pt's right side - sanguineous output; see flowsheets for volume.  - Pt's chevron incision has a Mepilex dressing with wound care orders to change on Tuesdays and Fridays.  - Pt has a Keofeed tube to his right nare with continuous tube feedings ordered (Novasource Renal at 45 mL/hr). TF ordered due to poor oral intake and low prealbumin levels.  - Pt complaining of pain and nausea overnight. Pt had an emesis occurrence - TF held for 2 hours and pt then refused to restart the TF; pt eventually agreed to restart the TF at half the ordered rate. IV Zofran and IV Compazine given. Pain medication regimen increased overnight (oxy 10 mg ordered in addition to 5 mg).  - Pt was CMV (+) on 2/1/18 - pt receives PO Valcyte on M/W/F after dialysis.  - IV abx: Zosyn extended infusion q12h; Cresemba q24h. Vancomycin one-time dose also given overnight.  - Pt refuses Colace, Miralax, and Metamucil due to inability to keep these medications down.  - Pt's VS are WNL except for low grade temps - max of 100 overnight. Will continue to monitor.

## 2018-02-08 NOTE — SUBJECTIVE & OBJECTIVE
Scheduled Meds:   albuterol-ipratropium 2.5mg-0.5mg/3mL  3 mL Nebulization Q4H WAKE    atovaquone  1,500 mg Oral Daily    cycloSPORINE  175 mg Oral BID    docusate sodium  100 mg Oral BID    epoetin maurisio (PROCRIT) injection  10,000 Units Intravenous Every Mon, Wed, Fri    heparin (porcine)  5,000 Units Subcutaneous Q8H    custom IVPB builder   Intravenous Q24H    levetiracetam oral soln  500 mg Oral BID    levothyroxine  75 mcg Oral Before breakfast    metoclopramide HCl  10 mg Oral QID (AC & HS)    omeprazole  40 mg Oral QAM    custom IVPB builder  2.25 g Intravenous Q12H    polyethylene glycol  17 g Oral BID    psyllium husk (aspartame)  3.4 g Oral BID    ursodiol  300 mg Oral TID    valganciclovir 50 mg/ml  200 mg Oral Every Mon, Wed, Fri     Continuous Infusions:  PRN Meds:sodium chloride, sodium chloride, sodium chloride, sodium chloride 0.9%, acetaminophen, bisacodyl, heparin (porcine), omnipaque, ondansetron, ondansetron, oxyCODONE, oxyCODONE, prochlorperazine, simethicone, sodium chloride 0.9%, sodium chloride 0.9%    Review of Systems   Constitutional: Positive for activity change, appetite change, fatigue and fever. Negative for chills.   HENT: Negative.  Negative for congestion and facial swelling.    Eyes: Negative for pain, discharge and visual disturbance.   Respiratory: Negative for cough, shortness of breath and wheezing.    Cardiovascular: Negative for chest pain and leg swelling.   Gastrointestinal: Positive for abdominal distention, abdominal pain, constipation and nausea. Negative for vomiting.   Genitourinary: Positive for decreased urine volume. Negative for difficulty urinating.        Anuric   Musculoskeletal: Negative for arthralgias, back pain and myalgias.   Skin: Positive for color change and wound. Negative for rash.   Allergic/Immunologic: Positive for immunocompromised state.   Neurological: Negative for dizziness, seizures, weakness and headaches.    Psychiatric/Behavioral: Positive for decreased concentration and dysphoric mood. Negative for confusion. The patient is not nervous/anxious.    All other systems reviewed and are negative.    Objective:     Vital Signs (Most Recent):  Temp: 98.9 °F (37.2 °C) (02/08/18 1227)  Pulse: 105 (02/08/18 1227)  Resp: 16 (02/08/18 1227)  BP: 127/82 (02/08/18 1227)  SpO2: (!) 94 % (02/08/18 1227) Vital Signs (24h Range):  Temp:  [97.9 °F (36.6 °C)-100 °F (37.8 °C)] 98.9 °F (37.2 °C)  Pulse:  [] 105  Resp:  [16-20] 16  SpO2:  [94 %-100 %] 94 %  BP: (121-132)/(67-82) 127/82     Weight: 70 kg (154 lb 5.2 oz)  Body mass index is 24.91 kg/m².    Intake/Output - Last 3 Shifts       02/06 0700 - 02/07 0659 02/07 0700 - 02/08 0659 02/08 0700 - 02/09 0659    P.O. 900 300     I.V. (mL/kg)  400 (5.7)     Blood 236      Other  600     NG/ 370     IV Piggyback 350 550     Total Intake(mL/kg) 2161 (31.3) 2220 (31.7)     Urine (mL/kg/hr)  60 (0)     Emesis/NG output  325 (0.2)     Drains  240 (0.1)     Other  2600 (1.5)     Stool  0 (0)     Total Output   3225      Net +2161 -1005             Urine Occurrence 0 x 0 x     Stool Occurrence 1 x 0 x           Physical Exam   Constitutional: He is oriented to person, place, and time. He appears well-developed. No distress.   Temporal and distal extremity muscle wasting   HENT:   Head: Normocephalic and atraumatic.   Mouth/Throat: No oropharyngeal exudate.   dylan tube   Eyes: EOM are normal. Pupils are equal, round, and reactive to light. Scleral icterus is present.   Neck: Normal range of motion. Neck supple. No JVD present. No thyromegaly present.   Cardiovascular: Normal rate, regular rhythm, normal heart sounds and intact distal pulses.    No murmur heard.  Pulmonary/Chest: Effort normal. No respiratory distress. He has decreased breath sounds in the right middle field, the right lower field and the left lower field. He has no wheezes. He exhibits no tenderness.   Diminished to  RLL   Abdominal: Soft. Bowel sounds are normal. He exhibits ascites. He exhibits no distension. There is tenderness (mild). There is no rebound and no guarding.   Dressing to chevron.  Wd vac removed 1/12/18  Dependent edema present R flank; surrounding erythema   Musculoskeletal: Normal range of motion. He exhibits edema (2+ LE edema). He exhibits no tenderness.   Neurological: He is alert and oriented to person, place, and time. He has normal reflexes.   Skin: Skin is warm and dry. Capillary refill takes 2 to 3 seconds. He is not diaphoretic. No erythema.   jaundice   Psychiatric: He has a normal mood and affect. His behavior is normal. Judgment and thought content normal. His mood appears not anxious.   Depressed mood   Nursing note and vitals reviewed.      Laboratory:  Immunosuppressants         Stop Route Frequency     cycloSPORINE (NEORAL) 100 mg/mL microemulsion solution 175 mg      -- Oral 2 times daily        CBC:     Recent Labs  Lab 02/08/18  0528   WBC 4.25   RBC 2.72*   HGB 8.2*   HCT 25.7*      MCV 95   MCH 30.1   MCHC 31.9*     CMP:     Recent Labs  Lab 02/08/18  0528   GLU 76   CALCIUM 8.9   ALBUMIN 1.7*   PROT 5.2*      K 4.2   CO2 20*      BUN 17   CREATININE 3.4*   ALKPHOS 96   ALT <5*   AST 16   BILITOT 2.1*     Labs within the past 24 hours have been reviewed.    Diagnostic Results:  I have personally reviewed all pertinent imaging studies.

## 2018-02-08 NOTE — PLAN OF CARE
Problem: Nutrition, Enteral (Adult)  Intervention: Monitor/Manage Nutrition Support  Recommendations    Recommendation/Intervention: Continue current TF regimen of Novasource Renal at 45mL/hr x24hrs. Please try to keep TF running as much as possible. Flushes per MD. Continue to endorse placement of PEJ for nocturnal tube feedings.     If nocturnal TF to be started and patient is eating some during the day, recommend Novasource Renal at 55mL/hr from 8pm - 8am. This will provide 1320 kcal, 60g protein, and 473mL free fluid.     If patient has very poor oral intake during the daytime, recommend Novasource Renal at 60mL/hr from 6pm - 8am. This will provide 1800 kcal, 82g protein, and 645mL free fluid.     Continue regular diet, encouraging intake as tolerating. RD following.     Goals: Consume/tolerate > 85% EEN and EPN  Nutrition Goal Status: progressing towards goal  Communication of RD Recs: reviewed with physician

## 2018-02-08 NOTE — ASSESSMENT & PLAN NOTE
27-year-old male with a history of alcoholic cirrhosis s/p DDLT 10/19/2017, CMV D+/R+, steroid induction, on cyclosporine c/b multiple hospitalizations for culture negative peritonitis, now with recurrent fevers and persistent abdominal pain. Recurrent fever started in setting of recent ERCP concerning for bacteremia associated with procedure. However, patient with persistent abdominal pain and imaging notable for multiple loculated fluid collections doing well s/p ERCP now again with low grade fever of 100.8 and mild CMV reactivation now s/p laparoscopic drainage of IA collection 2/7       - continue valganciclovir for CMV reactivation  - follow up cultures  - continue zosyn for now and if cultures negative would stop

## 2018-02-08 NOTE — ASSESSMENT & PLAN NOTE
- Continue daily CBC.  - Previously transfused 1/30, 2/6, continue procrit  - Given 1 U PRBC 2/6 with response in H/H, now stable

## 2018-02-08 NOTE — SUBJECTIVE & OBJECTIVE
Interval History: afebrile,went to OR yesterday laproscopic drainage of collection and breaking loculations, non purulent fluid grossly, c/o abdominal pain, not passing gas yet    Review of Systems   Constitutional: Negative for activity change, appetite change, chills, fatigue and fever.   HENT: Negative for congestion, dental problem, mouth sores and sinus pressure.    Eyes: Negative for pain, redness and visual disturbance.   Respiratory: Negative for cough, shortness of breath and wheezing.    Cardiovascular: Negative for chest pain and leg swelling.   Gastrointestinal: Positive for abdominal pain. Negative for abdominal distention, diarrhea, nausea and vomiting.   Endocrine: Negative for polyuria.   Genitourinary: Negative for decreased urine volume, dysuria and frequency.   Musculoskeletal: Negative for joint swelling.   Skin: Negative for color change.   Allergic/Immunologic: Negative for food allergies.   Neurological: Negative for dizziness, weakness and headaches.   Hematological: Negative for adenopathy.   Psychiatric/Behavioral: Negative for agitation and confusion. The patient is not nervous/anxious.      Objective:     Vital Signs (Most Recent):  Temp: 100 °F (37.8 °C) (02/08/18 0348)  Pulse: 97 (02/08/18 0348)  Resp: 20 (02/08/18 0348)  BP: 128/75 (02/08/18 0348)  SpO2: 95 % (02/08/18 0348) Vital Signs (24h Range):  Temp:  [97.9 °F (36.6 °C)-100 °F (37.8 °C)] 100 °F (37.8 °C)  Pulse:  [78-99] 97  Resp:  [17-20] 20  SpO2:  [95 %-100 %] 95 %  BP: (121-134)/(59-80) 128/75     Weight: 70 kg (154 lb 5.2 oz)  Body mass index is 24.91 kg/m².    Estimated Creatinine Clearance: 29.2 mL/min (based on SCr of 3.4 mg/dL (H)).    Physical Exam   Constitutional: He is oriented to person, place, and time. He appears well-developed and well-nourished.   HENT:   Head: Normocephalic and atraumatic.   Mouth/Throat: Oropharynx is clear and moist.   Eyes: Conjunctivae are normal.   Neck: Neck supple.   Cardiovascular:  Normal rate, regular rhythm and normal heart sounds.    No murmur heard.  Pulmonary/Chest: Effort normal and breath sounds normal. No respiratory distress. He has no wheezes.   Abdominal: Soft. Bowel sounds are normal. He exhibits no distension. There is no tenderness.   Musculoskeletal: Normal range of motion. He exhibits no edema or tenderness.   Lymphadenopathy:     He has no cervical adenopathy.   Neurological: He is alert and oriented to person, place, and time. Coordination normal.   Skin: Skin is warm and dry. No rash noted.   Psychiatric: He has a normal mood and affect. His behavior is normal.       Significant Labs:   CBC:     Recent Labs  Lab 02/07/18  0530 02/08/18  0528   WBC 3.26* 4.25   HGB 7.8* 8.2*   HCT 23.5* 25.7*    244     CMP:     Recent Labs  Lab 02/07/18  0530 02/08/18  0528    136   K 4.1 4.2    104   CO2 22* 20*   GLU 66* 76   BUN 28* 17   CREATININE 4.7* 3.4*   CALCIUM 9.4 8.9   PROT 5.4* 5.2*   ALBUMIN 1.8* 1.7*   BILITOT 2.3* 2.1*   ALKPHOS 101 96   AST 17 16   ALT 5* <5*   ANIONGAP 12 12   EGFRNONAA 15.7* 23.2*       Significant Imaging: I have reviewed all pertinent imaging results/findings within the past 24 hours.     Micro  2/7 OR lap drainage of IA collection: gram stain negative, cultures NGTD  2/6 CVC cath tip negative  2/1 RVP negative  1/31 fluid cx NGTD  1/30 bcx NGTD  2/1 asp ag negative  2/1 BDG negative  2/1 CMV quant 370 IU/ml  1/31 pleural fluid cx NGTd, 130 WBCs 34% segs

## 2018-02-08 NOTE — PROGRESS NOTES
[facility]  Adult Nutrition  Progress Note    SUMMARY     Recommendations    Recommendation/Intervention: Continue current TF regimen of Novasource Renal at 45mL/hr x24hrs. Please try to keep TF running as much as possible. Flushes per MD. Continue to endorse placement of PEJ for nocturnal tube feedings.     If nocturnal TF to be started and patient is eating some during the day, recommend Novasource Renal at 55mL/hr from 8pm - 8am. This will provide 1320 kcal, 60g protein, and 473mL free fluid.     If patient has very poor oral intake during the daytime, recommend Novasource Renal at 60mL/hr from 6pm - 8am. This will provide 1800 kcal, 82g protein, and 645mL free fluid.     Continue regular diet, encouraging intake as tolerating. RD following.     Goals: Consume/tolerate > 85% EEN and EPN  Nutrition Goal Status: progressing towards goal  Communication of RD Recs: reviewed with physician    Reason for Assessment    Reason for Assessment: RD follow-up  Diagnosis: transplant/postoperative complications  Relevent Medical History: OLTx 10/19/2017, complicate post-op w/ LAURA ongoing, chronic malnutrition, cirrhosis 2' EtOH   Interdisciplinary Rounds: attended     General Information Comments: TF on and off due to patient c/o N/V, off at visit, per nursing documentation TF stopped to 1/2 rate at 1am and returned to full rate at 6am, washout yesterday, n/v today. pt with 0% intake for several days    Nutrition Discharge Planning: Adequate nutrition PO vs EN    Nutrition Prescription Ordered    Current Diet Order: Regular  Nutrition Order Comments: TF running intermittently  Current Nutrition Support Formula Ordered: Novasource Renal  Current Nutrition Support Rate Ordered: 45 (ml)  Current Nutrition Support Frequency Ordered: mL/hr      Evaluation of Received Nutrients/Fluid Intake    Enteral Calories (kcal): 2160  Enteral Protein (gm): 98  Enteral (Free Water) Fluid (mL): 774     Parenteral Calories  "(kcal): 0  Parenteral Protein (gm): 0  Parenteral Fluid (mL): 0      Total Calories (kcal): 0     Energy Calories Required: meeting needs  % Kcal Needs: ~75%       Protein Required: meeting needs  % Protein Needs: ~75%      GIR (Glucose Infusion Rate) (mg/kg/min): 0 mg/kg/min  Lipid Calories (kcals): 0 kcals  I/O: +4.2L since admit       Fluid Required:  (per MD)  Comments: LBM 2/6  Tolerance: tolerating  % Intake of Estimated Energy Needs: 75 - 100 % (uknown due to intermittent TF)  % Meal Intake: 0%     Nutrition Risk Screen     Nutrition Risk Screen: large or nonhealing wound, burn or pressure ulcer    Nutrition/Diet History    Patient Reported Diet/Restrictions/Preferences: low salt  Typical Food/Fluid Intake: eats very little at this time  Food Preferences: No cultural or Yarsani food preferences noted  Meal/Snack Patterns: pt likes to cook, when feeling well will cook and then only eat bites  Supplemental Drinks or Food Habits:  (protein powders sometimes)  Factors Affecting Nutritional Intake: altered gastrointestinal function, abdominal distention, decreased appetite, nausea/vomiting      Labs/Tests/Procedures/Meds    Pertinent Labs Reviewed: reviewed, pertinent  Pertinent Labs Comments: Cr 3.4, GFR 23.2, TBil 2.1  Pertinent Medications Reviewed: reviewed, pertinent  Pertinent Medications Comments: docusate, heparin, metoclopramide, psyllium    Physical Findings    Overall Physical Appearance: generalized wasting, loss of muscle mass, loss of subcutaneous fat, weak  Tubes: nasoduoduenal tube  Oral/Mouth Cavity: WDL  Skin: intact    Anthropometrics    Temp: 99.7 °F (37.6 °C)     Height: 5' 6" (167.6 cm)  Weight Method: Bed Scale  Weight: 70 kg (154 lb 5.2 oz)  Ideal Body Weight (IBW), Male: 142 lb     % Ideal Body Weight, Male (lb): 112.68 lb     BMI (Calculated): 25.9  BMI Grade: 25 - 29.9 - overweight  Weight Loss: unintentional  Usual Body Weight (UBW), k kg (2017 )     % Usual Body Weight: " 86.71  % Weight Change From Usual Weight: -13.47 %      Estimated/Assessed Needs    Weight Used For Calorie Calculations: 62.3 kg (137 lb 5.6 oz)   Height (cm): 167.6 cm  Energy Calorie Requirements (kcal): 7628-2767 (30-35 kcal/kg)  Energy Need Method: Kcal/kg      RMR ([place]-St. Jeor Equation): 1535.75      Weight Used For Protein Calculations: 62.3 kg (137 lb 5.6 oz)  Protein Requirements: 81-94g (1.3-1.5 g/kg)    Fluid Requirements (mL): 1mL/kcal  Fluid Need Method: RDA Method      RDA Method (mL): 1869       Assessment and Plan    Severe protein-calorie malnutrition    Nutrition Diagnosis  Inadequate oral intake    Related to (etiology):   Decreased appetite, abdominal distention    Signs and Symptoms (as evidenced by):   Pt eating 0% of meals and relying on enteral nutrition for 100% of EPN, EEN     Interventions/Recommendations (treatment strategy):  See recs    Nutrition Diagnosis Status:   Improving              Monitor and Evaluation    Food and Nutrient Intake: energy intake, food and beverage intake  Food and Nutrient Adminstration: diet order  Knowledge/Beliefs/Attitudes: food and nutrition knowledge/skill  Physical Activity and Function: nutrition-related ADLs and IADLs  Anthropometric Measurements: weight, weight change, body mass index  Biochemical Data, Medical Tests and Procedures: electrolyte and renal panel, lipid profile, gastrointestinal profile, glucose/endocrine profile, inflammatory profile  Nutrition-Focused Physical Findings: overall appearance    Nutrition Risk    Level of Risk:  (2x/week)    Nutrition Follow-Up    RD Follow-up?: Yes

## 2018-02-08 NOTE — PROGRESS NOTES
[facility]  Liver Transplant  Progress Note    Patient Name: Jhonny Diana  MRN: [id number]  Admission Date: 2018  Hospital Length of Stay: 28 days  Code Status: Full Code  Primary Care Provider: Primary Doctor No  Post-Operative Day: 112    ORGAN:   LIVER  Disease Etiology: Acute Alcoholic Hepatitis  Donor Type:    - Brain Death  CDC High Risk:   No  Donor CMV Status:   Donor CMV Status: Positive  Donor HBcAB:   Negative  Donor HCV Status:   Negative  Whole or Partial: Whole Liver  Biliary Anastomosis: End to End  Arterial Anatomy: Standard  Subjective:     History of Present Illness:  Jhonny Diana is a 26 y/o male with past medical history of alcoholic cirrhosis.  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 Found to have peritonitis in November (WBC 5000, 75% PNM) neg for bile leak. He was treated initially with vanc/cefepime. Repeat cell counts  with some improvement (WBC 1400, 45% PNM). He has undergone multiple paracenteses as well as abscess drainage of perihepatic fluid collections and treated with antimicrobial therapy but no positive cultures. Of note, biliary stricture also identified and ERCP performed on 2017 with sphincterotomy and biliary stent placed. Liver tests still have not normalized despite intervention, bilirubin and AP remain elevated. Other pertinent PMH current wound vac in place 2/2 wound infection, malnutrition requiring TPN for short course and ongoing hypoalbuminemia, and seizure activity while on prograf and has since been switched to cyclosporine without reoccurrence.  He presented to the ER for fever, abdominal pain, and N/V. He reports fever (103) for 1  day prior. Overnight, he developed N/V, reports small amount of green emesis with new left sided pain. He also endorses worsening SOB with exertion. He was scheduled as an outpatient for follow up paracentesis and IR drainage of fluid collection. CXR in ER shows large pleural effusion with subsegmental atelectasis. Infectious work up initiated in ER. His ANC is 900. Broad spectrum antibiotics initiated in ED. He denies chest pain, palpitations, diarrhea, constipation, or back pain. Denies any sick contacts.    Hospital Course:  Thoracentesis (1.2L off), Paracentesis (1.6L off), and IR drainage of fluid collection 1/11, all fluids negative for infection. ID consulted. Broad spectrum antibiotics d/c'd 1/15. Chronically malnourished with poor PO intake, prealbumin 7. Shane tube placed 1/15 for tube feedings.     ERCP 1/17 with sludge and a biliary stone which was removed and stent exchanged.  Remained with n/v; therefore, TF remained on hold and TPN continued.   Pt with fever s/p ERCP on 1/17 which continued until 1/19.  Vanc/cefepime restarted.  Blood cx 1/17 and 1/18 NGTD.  ID reconsulted.  Fungal markers sent.  CT C/A/P obtained.  With large R pleural effusion and ascites- both drained 1/19 and negative for infx per cell count. Pt afebrile 1/20. Liver biopsy 1/23 - without rejection.  EGD 1/25 - unremarkable for source of GI symptoms.  Resume diet along with other GI recommendations. SHANE tube placed for tube feeds.       Interval History:  Pt with some improvement in fevers overnight. Remains on vanc and zosyn. Taken to OR 2/7/18 where multiple fluid collections drained.  Fluid sent for cultures - pending.  ID following - will continue abx for now.  Complained of pain on left heel - xray completed which were unremarkable.  Transitioned to nightly tube feeds to encourage eating during the day.    Scheduled Meds:   albuterol-ipratropium 2.5mg-0.5mg/3mL  3 mL Nebulization Q4H WAKE    atovaquone  1,500 mg Oral Daily     cycloSPORINE  175 mg Oral BID    docusate sodium  100 mg Oral BID    epoetin maurisio (PROCRIT) injection  10,000 Units Intravenous Every Mon, Wed, Fri    heparin (porcine)  5,000 Units Subcutaneous Q8H    custom IVPB builder   Intravenous Q24H    levetiracetam oral soln  500 mg Oral BID    levothyroxine  75 mcg Oral Before breakfast    metoclopramide HCl  10 mg Oral QID (AC & HS)    omeprazole  40 mg Oral QAM    custom IVPB builder  2.25 g Intravenous Q12H    polyethylene glycol  17 g Oral BID    psyllium husk (aspartame)  3.4 g Oral BID    ursodiol  300 mg Oral TID    valganciclovir 50 mg/ml  200 mg Oral Every Mon, Wed, Fri     Continuous Infusions:  PRN Meds:sodium chloride, sodium chloride, sodium chloride, sodium chloride 0.9%, acetaminophen, bisacodyl, heparin (porcine), omnipaque, ondansetron, ondansetron, oxyCODONE, oxyCODONE, prochlorperazine, simethicone, sodium chloride 0.9%, sodium chloride 0.9%    Review of Systems   Constitutional: Positive for activity change, appetite change, fatigue and fever. Negative for chills.   HENT: Negative.  Negative for congestion and facial swelling.    Eyes: Negative for pain, discharge and visual disturbance.   Respiratory: Negative for cough, shortness of breath and wheezing.    Cardiovascular: Negative for chest pain and leg swelling.   Gastrointestinal: Positive for abdominal distention, abdominal pain, constipation and nausea. Negative for vomiting.   Genitourinary: Positive for decreased urine volume. Negative for difficulty urinating.        Anuric   Musculoskeletal: Negative for arthralgias, back pain and myalgias.   Skin: Positive for color change and wound. Negative for rash.   Allergic/Immunologic: Positive for immunocompromised state.   Neurological: Negative for dizziness, seizures, weakness and headaches.   Psychiatric/Behavioral: Positive for decreased concentration and dysphoric mood. Negative for confusion. The patient is not nervous/anxious.     All other systems reviewed and are negative.    Objective:     Vital Signs (Most Recent):  Temp: 98.9 °F (37.2 °C) (02/08/18 1227)  Pulse: 105 (02/08/18 1227)  Resp: 16 (02/08/18 1227)  BP: 127/82 (02/08/18 1227)  SpO2: (!) 94 % (02/08/18 1227) Vital Signs (24h Range):  Temp:  [97.9 °F (36.6 °C)-100 °F (37.8 °C)] 98.9 °F (37.2 °C)  Pulse:  [] 105  Resp:  [16-20] 16  SpO2:  [94 %-100 %] 94 %  BP: (121-132)/(67-82) 127/82     Weight: 70 kg (154 lb 5.2 oz)  Body mass index is 24.91 kg/m².    Intake/Output - Last 3 Shifts       02/06 0700 - 02/07 0659 02/07 0700 - 02/08 0659 02/08 0700 - 02/09 0659    P.O. 900 300     I.V. (mL/kg)  400 (5.7)     Blood 236      Other  600     NG/ 370     IV Piggyback 350 550     Total Intake(mL/kg) 2161 (31.3) 2220 (31.7)     Urine (mL/kg/hr)  60 (0)     Emesis/NG output  325 (0.2)     Drains  240 (0.1)     Other  2600 (1.5)     Stool  0 (0)     Total Output   3225      Net +2161 -1005             Urine Occurrence 0 x 0 x     Stool Occurrence 1 x 0 x           Physical Exam   Constitutional: He is oriented to person, place, and time. He appears well-developed. No distress.   Temporal and distal extremity muscle wasting   HENT:   Head: Normocephalic and atraumatic.   Mouth/Throat: No oropharyngeal exudate.   dylan tube   Eyes: EOM are normal. Pupils are equal, round, and reactive to light. Scleral icterus is present.   Neck: Normal range of motion. Neck supple. No JVD present. No thyromegaly present.   Cardiovascular: Normal rate, regular rhythm, normal heart sounds and intact distal pulses.    No murmur heard.  Pulmonary/Chest: Effort normal. No respiratory distress. He has decreased breath sounds in the right middle field, the right lower field and the left lower field. He has no wheezes. He exhibits no tenderness.   Diminished to RLL   Abdominal: Soft. Bowel sounds are normal. He exhibits ascites. He exhibits no distension. There is tenderness (mild). There is no rebound  and no guarding.   Dressing to chevron.  Wd vac removed 1/12/18  Dependent edema present R flank; surrounding erythema   Musculoskeletal: Normal range of motion. He exhibits edema (2+ LE edema). He exhibits no tenderness.   Neurological: He is alert and oriented to person, place, and time. He has normal reflexes.   Skin: Skin is warm and dry. Capillary refill takes 2 to 3 seconds. He is not diaphoretic. No erythema.   jaundice   Psychiatric: He has a normal mood and affect. His behavior is normal. Judgment and thought content normal. His mood appears not anxious.   Depressed mood   Nursing note and vitals reviewed.      Laboratory:  Immunosuppressants         Stop Route Frequency     cycloSPORINE (NEORAL) 100 mg/mL microemulsion solution 175 mg      -- Oral 2 times daily        CBC:     Recent Labs  Lab 02/08/18  0528   WBC 4.25   RBC 2.72*   HGB 8.2*   HCT 25.7*      MCV 95   MCH 30.1   MCHC 31.9*     CMP:     Recent Labs  Lab 02/08/18  0528   GLU 76   CALCIUM 8.9   ALBUMIN 1.7*   PROT 5.2*      K 4.2   CO2 20*      BUN 17   CREATININE 3.4*   ALKPHOS 96   ALT <5*   AST 16   BILITOT 2.1*     Labs within the past 24 hours have been reviewed.    Diagnostic Results:  I have personally reviewed all pertinent imaging studies.    Assessment/Plan:     Fever    - Pt afebrile now overnight.   - Started on vanc/cefepime for ERCP. Now transitioned to vanc/zosyn   - Blood cultures sent, prelim NGTD.   - thoracentesis 2/1 - fluid negative for infection  - atelectasis noted on ct scan - start breathing tx & CPT  - now cmv positive - started treatment 2/5/18  - taken to OR 2/7 for exp lap - fluid collections drained, fluid sent for analysis        Severe protein-calorie malnutrition    - Poor PO intake since transplant requiring short course of TPN during previous hospital stay.   - albumin remains decreased but appetite slowly improving per pt.   - Dietary consulted. Will need to closely monitor PO intake.   -  supplements also ordered.   - SHANE with tube feeding start 1/16. Stopped 1/17/18, was not tolerating. Shane removed 1/20.  - prealbumin 7 on 1/15.  - TPN started 1/16/18.   - Pt able to eat minimally 1/21 and 1/22 which is an improvement.  - gi consulted - EGD reviewed, increased ppi bid, miralax bid, and metamucil bid, decreased narcotics  - continue reglan, placed SHANE tube with tube feeds - transitioned to nightly feeds          Acute renal failure with tubular necrosis    - HD resumed on previous admission. Now anuric and dialyzes M-W-F.  - Nephrology following.    - HD Sat since held Friday for thora/para.  - HD transitioned to TTS schedule.  - ktm consulted for possible kidney transplant         Other cytomegaloviral diseases    - detected on last CMV PCR.   - case discussed with ID and will hold off on treatment at this time given low WBC.         Liver transplanted    - Post op course complicated by fevers and hyperbilirubinemia.  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  - AST/ALT normal. Tbili/alkphos remain elevated.  - Liver US 1/8 showed 3.9 cm complex fluid collection anterior to right lobe and moderate nonspecific complex ascites inferior to transplant.   - IR placed drain 1/11/18, cell count negative for infection.  - PBS consulted. PBS not comfortable proceeding with EUS with liver biopsy given fever and possible infectious process going on. ERCP 1/17 with stone and sludge. Pt placed on Actigall - treated with 10 day course of abx.  ID now signed off.  - Bili elevated - liver u/s 1/28  - Liver biopsy 1/23 without rejection.  - consulted PBS for ERCP - Choledocholithiasis was found along with sludge, stents placed 1/30  - T bili now improving.             Constipation    - Enema ordered 1/20 and 1/21 with BM  - Encourage ambulation.  - decrease narcotics  - d/c metamucil and miralax bid as makes patient nauseated  - increase reglan qid            Long-term use of  immunosuppressant medication    - Maintenance IS with cyclosporine. MMF on hold for infections and neutropenia. Continue to check cyclosporine level daily. Assess for toxicity and adjust level as needed.        Prophylactic immunotherapy    - See long term use of immunosuppression.         Anemia of chronic disease    - Continue daily CBC.  - Previously transfused 1/30, 2/6, continue procrit  - Given 1 U PRBC 2/6 with response in H/H, now stable        Recurrent pleural effusion on right    - CXR in ER with large right pleural effusion with subjective c/o worsening SOB  - thoracentesis completed- 1200 ml removed.  SOB with significant improvement.   - Cell count reviewed and negative for infection.   - resp even and non labored.  O2 sat 94-98%.  - Repeat thora 1/19 negative for infection per cell count  - Reaccumulating fluid on xray 1/21.  - thoracentesis 1/31 - 1.4L removed, negative for infection        Biliary stricture of transplanted liver    - tbili with increase prompting ERCP 1/30 with stones and sludge, stents placed  - T bili now improving. Trend daily.         At risk for opportunistic infections    - CMV detected 2/1 now on tx dose valcyte  - bactrim held.          Delayed surgical wound healing    - wd vac removed 1/12/18.  Wound healing well. Aquacel AG and Mepilex border dressing applied via wound care recs.           Other ascites    - Paracentesis performed 1/11/18 with 1600 ml removed.    - fluid negative for infection.   - Repeat para 1/19 negative for infection per cell count  - no fluid seen for paracentesis on 1/23.        Nausea and vomiting    - h/o constipation, reports having regular BM's with bowel regimen  - vomiting episode x 1 at home. Anti-emetics ordered PRN.  - KUB repeated 1/14/18 given increased abdominal pain- mild, generalized bowel distention suggesting ileus.  Diet changed to clears as tolerated for bowel rest   - SHANE tube placed 1/16 for tube feeds. Not tolerating tube feeds,  stopped 1/17/18.  - D/C dylan 1/19, now in place as of 1/25.  - chronic constipation, Cont bowel regimen.   - GI consulted. See protein calorie malnutrition              VTE Risk Mitigation         Ordered     heparin (porcine) injection 1,000 Units  As needed (PRN)     Route:  Intra-Catheter        02/05/18 0945     heparin (porcine) injection 5,000 Units  Every 8 hours     Route:  Subcutaneous        01/31/18 0957     Medium Risk of VTE  Once      01/11/18 0351     Place sequential compression device  Until discontinued      01/11/18 0351          The patients clinical status was discussed at multidisplinary rounds, involving transplant surgery, transplant medicine, pharmacy, nursing, nutrition, and social work    Discharge Planning:not candidate at this time  Monitor kidney function --> may need outpt HD  Monitor HH needs vs rehab for deconditioned status      Seda Camacho, NP  Liver Transplant  [facility]

## 2018-02-09 LAB
ALBUMIN SERPL BCP-MCNC: 1.7 G/DL
ALP SERPL-CCNC: 104 U/L
ALT SERPL W/O P-5'-P-CCNC: <5 U/L
ANION GAP SERPL CALC-SCNC: 11 MMOL/L
ANISOCYTOSIS BLD QL SMEAR: SLIGHT
AST SERPL-CCNC: 22 U/L
BASOPHILS # BLD AUTO: ABNORMAL K/UL
BASOPHILS NFR BLD: 1 %
BILIRUB SERPL-MCNC: 1.9 MG/DL
BUN SERPL-MCNC: 27 MG/DL
CALCIUM SERPL-MCNC: 9.3 MG/DL
CHLORIDE SERPL-SCNC: 104 MMOL/L
CMV DNA SERPL NAA+PROBE-ACNC: NORMAL IU/ML
CO2 SERPL-SCNC: 21 MMOL/L
CREAT SERPL-MCNC: 4.7 MG/DL
CYCLOSPORINE BLD LC/MS/MS-MCNC: 549 NG/ML
DIFFERENTIAL METHOD: ABNORMAL
EOSINOPHIL # BLD AUTO: ABNORMAL K/UL
EOSINOPHIL NFR BLD: 8 %
ERYTHROCYTE [DISTWIDTH] IN BLOOD BY AUTOMATED COUNT: 14.3 %
EST. GFR  (AFRICAN AMERICAN): 18.2 ML/MIN/1.73 M^2
EST. GFR  (NON AFRICAN AMERICAN): 15.7 ML/MIN/1.73 M^2
GLUCOSE SERPL-MCNC: 99 MG/DL
HCT VFR BLD AUTO: 26.3 %
HGB BLD-MCNC: 8.7 G/DL
HYPOCHROMIA BLD QL SMEAR: ABNORMAL
IMM GRANULOCYTES # BLD AUTO: ABNORMAL K/UL
IMM GRANULOCYTES NFR BLD AUTO: ABNORMAL %
LYMPHOCYTES # BLD AUTO: ABNORMAL K/UL
LYMPHOCYTES NFR BLD: 23 %
MAGNESIUM SERPL-MCNC: 1.8 MG/DL
MCH RBC QN AUTO: 31 PG
MCHC RBC AUTO-ENTMCNC: 33.1 G/DL
MCV RBC AUTO: 94 FL
MONOCYTES # BLD AUTO: ABNORMAL K/UL
MONOCYTES NFR BLD: 23 %
NEUTROPHILS NFR BLD: 44 %
NEUTS BAND NFR BLD MANUAL: 1 %
NRBC BLD-RTO: 0 /100 WBC
OVALOCYTES BLD QL SMEAR: ABNORMAL
PHOSPHATE SERPL-MCNC: 4 MG/DL
PLATELET # BLD AUTO: 255 K/UL
PMV BLD AUTO: 10.7 FL
POIKILOCYTOSIS BLD QL SMEAR: SLIGHT
POLYCHROMASIA BLD QL SMEAR: ABNORMAL
POTASSIUM SERPL-SCNC: 4.4 MMOL/L
PROT SERPL-MCNC: 5.4 G/DL
RBC # BLD AUTO: 2.81 M/UL
SODIUM SERPL-SCNC: 136 MMOL/L
VANCOMYCIN TROUGH SERPL-MCNC: 15.8 UG/ML
WBC # BLD AUTO: 4.28 K/UL

## 2018-02-09 PROCEDURE — 84100 ASSAY OF PHOSPHORUS: CPT

## 2018-02-09 PROCEDURE — 94640 AIRWAY INHALATION TREATMENT: CPT

## 2018-02-09 PROCEDURE — 85007 BL SMEAR W/DIFF WBC COUNT: CPT

## 2018-02-09 PROCEDURE — 25000242 PHARM REV CODE 250 ALT 637 W/ HCPCS: Performed by: NURSE PRACTITIONER

## 2018-02-09 PROCEDURE — 63600175 PHARM REV CODE 636 W HCPCS: Mod: JG | Performed by: SURGERY

## 2018-02-09 PROCEDURE — 20600001 HC STEP DOWN PRIVATE ROOM

## 2018-02-09 PROCEDURE — 63600175 PHARM REV CODE 636 W HCPCS: Mod: JG | Performed by: NURSE PRACTITIONER

## 2018-02-09 PROCEDURE — 25000003 PHARM REV CODE 250: Performed by: STUDENT IN AN ORGANIZED HEALTH CARE EDUCATION/TRAINING PROGRAM

## 2018-02-09 PROCEDURE — 80053 COMPREHEN METABOLIC PANEL: CPT

## 2018-02-09 PROCEDURE — 25000003 PHARM REV CODE 250: Performed by: NURSE PRACTITIONER

## 2018-02-09 PROCEDURE — 94761 N-INVAS EAR/PLS OXIMETRY MLT: CPT

## 2018-02-09 PROCEDURE — 94664 DEMO&/EVAL PT USE INHALER: CPT

## 2018-02-09 PROCEDURE — 36415 COLL VENOUS BLD VENIPUNCTURE: CPT

## 2018-02-09 PROCEDURE — 96372 THER/PROPH/DIAG INJ SC/IM: CPT

## 2018-02-09 PROCEDURE — 90935 HEMODIALYSIS ONE EVALUATION: CPT | Mod: ,,, | Performed by: INTERNAL MEDICINE

## 2018-02-09 PROCEDURE — 99900035 HC TECH TIME PER 15 MIN (STAT)

## 2018-02-09 PROCEDURE — 25000003 PHARM REV CODE 250: Performed by: INTERNAL MEDICINE

## 2018-02-09 PROCEDURE — 99233 SBSQ HOSP IP/OBS HIGH 50: CPT | Mod: 24,,, | Performed by: NURSE PRACTITIONER

## 2018-02-09 PROCEDURE — 80158 DRUG ASSAY CYCLOSPORINE: CPT

## 2018-02-09 PROCEDURE — 25000003 PHARM REV CODE 250: Performed by: SURGERY

## 2018-02-09 PROCEDURE — 85027 COMPLETE CBC AUTOMATED: CPT

## 2018-02-09 PROCEDURE — 63600175 PHARM REV CODE 636 W HCPCS: Performed by: PHYSICIAN ASSISTANT

## 2018-02-09 PROCEDURE — 63600175 PHARM REV CODE 636 W HCPCS: Performed by: NURSE PRACTITIONER

## 2018-02-09 PROCEDURE — 83735 ASSAY OF MAGNESIUM: CPT

## 2018-02-09 PROCEDURE — 80202 ASSAY OF VANCOMYCIN: CPT

## 2018-02-09 PROCEDURE — 90935 HEMODIALYSIS ONE EVALUATION: CPT

## 2018-02-09 RX ADMIN — OMEPRAZOLE 40 MG: 40 CAPSULE, DELAYED RELEASE ORAL at 07:02

## 2018-02-09 RX ADMIN — ATOVAQUONE 1500 MG: 750 SUSPENSION ORAL at 12:02

## 2018-02-09 RX ADMIN — OXYCODONE HYDROCHLORIDE 10 MG: 5 TABLET ORAL at 01:02

## 2018-02-09 RX ADMIN — ONDANSETRON HYDROCHLORIDE 4 MG: 2 INJECTION, SOLUTION INTRAMUSCULAR; INTRAVENOUS at 05:02

## 2018-02-09 RX ADMIN — METOCLOPRAMIDE HYDROCHLORIDE 10 MG: 5 SOLUTION ORAL at 08:02

## 2018-02-09 RX ADMIN — ERYTHROPOIETIN 10000 UNITS: 10000 INJECTION, SOLUTION INTRAVENOUS; SUBCUTANEOUS at 11:02

## 2018-02-09 RX ADMIN — SODIUM CHLORIDE: 9 INJECTION, SOLUTION INTRAVENOUS at 12:02

## 2018-02-09 RX ADMIN — OXYCODONE HYDROCHLORIDE 10 MG: 5 TABLET ORAL at 08:02

## 2018-02-09 RX ADMIN — ONDANSETRON HYDROCHLORIDE 4 MG: 2 INJECTION, SOLUTION INTRAMUSCULAR; INTRAVENOUS at 07:02

## 2018-02-09 RX ADMIN — URSODIOL 300 MG: 300 CAPSULE ORAL at 08:02

## 2018-02-09 RX ADMIN — BISACODYL 10 MG: 10 SUPPOSITORY RECTAL at 05:02

## 2018-02-09 RX ADMIN — PIPERACILLIN AND TAZOBACTAM 2.25 G: 4; .5 INJECTION, POWDER, LYOPHILIZED, FOR SOLUTION INTRAVENOUS; PARENTERAL at 09:02

## 2018-02-09 RX ADMIN — IPRATROPIUM BROMIDE AND ALBUTEROL SULFATE 3 ML: .5; 3 SOLUTION RESPIRATORY (INHALATION) at 03:02

## 2018-02-09 RX ADMIN — METOCLOPRAMIDE HYDROCHLORIDE 10 MG: 5 SOLUTION ORAL at 12:02

## 2018-02-09 RX ADMIN — METOCLOPRAMIDE HYDROCHLORIDE 10 MG: 5 SOLUTION ORAL at 05:02

## 2018-02-09 RX ADMIN — URSODIOL 300 MG: 300 CAPSULE ORAL at 05:02

## 2018-02-09 RX ADMIN — HEPARIN SODIUM 1000 UNITS: 1000 INJECTION, SOLUTION INTRAVENOUS; SUBCUTANEOUS at 12:02

## 2018-02-09 RX ADMIN — CYCLOSPORINE 175 MG: 100 SOLUTION ORAL at 06:02

## 2018-02-09 RX ADMIN — OXYCODONE HYDROCHLORIDE 10 MG: 5 TABLET ORAL at 05:02

## 2018-02-09 RX ADMIN — SODIUM CHLORIDE: 0.9 INJECTION, SOLUTION INTRAVENOUS at 08:02

## 2018-02-09 RX ADMIN — LEVOTHYROXINE SODIUM 75 MCG: 75 TABLET ORAL at 05:02

## 2018-02-09 RX ADMIN — URSODIOL 300 MG: 300 CAPSULE ORAL at 01:02

## 2018-02-09 RX ADMIN — CYCLOSPORINE 175 MG: 100 SOLUTION ORAL at 05:02

## 2018-02-09 RX ADMIN — HEPARIN SODIUM 5000 UNITS: 5000 INJECTION, SOLUTION INTRAVENOUS; SUBCUTANEOUS at 05:02

## 2018-02-09 RX ADMIN — OXYCODONE HYDROCHLORIDE 10 MG: 5 TABLET ORAL at 09:02

## 2018-02-09 RX ADMIN — LEVETIRACETAM 500 MG: 100 SOLUTION ORAL at 12:02

## 2018-02-09 RX ADMIN — LEVETIRACETAM 500 MG: 100 SOLUTION ORAL at 08:02

## 2018-02-09 RX ADMIN — PIPERACILLIN AND TAZOBACTAM 2.25 G: 4; .5 INJECTION, POWDER, LYOPHILIZED, FOR SOLUTION INTRAVENOUS; PARENTERAL at 12:02

## 2018-02-09 RX ADMIN — METOCLOPRAMIDE HYDROCHLORIDE 10 MG: 5 SOLUTION ORAL at 06:02

## 2018-02-09 RX ADMIN — HEPARIN SODIUM 5000 UNITS: 5000 INJECTION, SOLUTION INTRAVENOUS; SUBCUTANEOUS at 09:02

## 2018-02-09 RX ADMIN — MORPHINE 200 MG: 10 SOLUTION ORAL at 06:02

## 2018-02-09 NOTE — PROGRESS NOTES
Patient returned to unit per wheelchair by transport aide.   No acute distress noted. Will continue to monitor.

## 2018-02-09 NOTE — SUBJECTIVE & OBJECTIVE
Scheduled Meds:   albuterol-ipratropium 2.5mg-0.5mg/3mL  3 mL Nebulization Q4H WAKE    atovaquone  1,500 mg Oral Daily    cycloSPORINE  175 mg Oral BID    docusate sodium  100 mg Oral BID    epoetin maurisio (PROCRIT) injection  10,000 Units Intravenous Every Mon, Wed, Fri    heparin (porcine)  5,000 Units Subcutaneous Q8H    custom IVPB builder   Intravenous Q24H    levetiracetam oral soln  500 mg Oral BID    levothyroxine  75 mcg Oral Before breakfast    metoclopramide HCl  10 mg Oral QID (AC & HS)    omeprazole  40 mg Oral QAM    custom IVPB builder  2.25 g Intravenous Q12H    polyethylene glycol  17 g Oral BID    psyllium husk (aspartame)  3.4 g Oral BID    ursodiol  300 mg Oral TID    valganciclovir 50 mg/ml  200 mg Oral Every Mon, Wed, Fri     Continuous Infusions:  PRN Meds:sodium chloride, sodium chloride, sodium chloride, acetaminophen, bisacodyl, heparin (porcine), omnipaque, ondansetron, ondansetron, oxyCODONE, oxyCODONE, prochlorperazine, simethicone, sodium chloride 0.9%, sodium chloride 0.9%    Review of Systems   Constitutional: Positive for activity change, appetite change, fatigue and fever. Negative for chills.   HENT: Negative.  Negative for congestion and facial swelling.    Eyes: Negative for pain, discharge and visual disturbance.   Respiratory: Negative for cough, shortness of breath and wheezing.    Cardiovascular: Negative for chest pain and leg swelling.   Gastrointestinal: Positive for abdominal distention, abdominal pain, constipation and nausea. Negative for vomiting.   Genitourinary: Positive for decreased urine volume. Negative for difficulty urinating.        Anuric   Musculoskeletal: Negative for arthralgias, back pain and myalgias.   Skin: Positive for color change and wound. Negative for rash.   Allergic/Immunologic: Positive for immunocompromised state.   Neurological: Negative for dizziness, seizures, weakness and headaches.   Psychiatric/Behavioral: Positive for  decreased concentration and dysphoric mood. Negative for confusion. The patient is not nervous/anxious.    All other systems reviewed and are negative.    Objective:     Vital Signs (Most Recent):  Temp: 99.9 °F (37.7 °C) (02/09/18 1252)  Pulse: (!) 111 (02/09/18 1252)  Resp: 18 (02/09/18 1252)  BP: 123/66 (02/09/18 1252)  SpO2: (!) 94 % (02/09/18 1252) Vital Signs (24h Range):  Temp:  [99.8 °F (37.7 °C)-100.4 °F (38 °C)] 99.9 °F (37.7 °C)  Pulse:  [] 111  Resp:  [16-20] 18  SpO2:  [92 %-97 %] 94 %  BP: (113-144)/(58-86) 123/66     Weight: 70.9 kg (156 lb 4.9 oz)  Body mass index is 25.23 kg/m².    Intake/Output - Last 3 Shifts       02/07 0700 - 02/08 0659 02/08 0700 - 02/09 0659 02/09 0700 - 02/10 0659    P.O. 300 300     I.V. (mL/kg) 400 (5.7)      Blood       Other 600  600    NG/ 715 5    IV Piggyback 550 350     Total Intake(mL/kg) 2220 (31.7) 1365 (19.3) 605 (8.5)    Urine (mL/kg/hr) 60 (0) 0 (0)     Emesis/NG output 325 (0.2)      Drains 240 (0.1) 145 (0.1) 30 (0.1)    Other 2600 (1.5)  2697 (5.7)    Stool 0 (0)  0 (0)    Total Output 3225 145 2727    Net -1005 +1220 -2122           Urine Occurrence 0 x      Stool Occurrence 0 x  0 x          Physical Exam   Constitutional: He is oriented to person, place, and time. He appears well-developed. No distress.   Temporal and distal extremity muscle wasting   HENT:   Head: Normocephalic and atraumatic.   Mouth/Throat: No oropharyngeal exudate.   dylan tube   Eyes: EOM are normal. Pupils are equal, round, and reactive to light. Scleral icterus is present.   Neck: Normal range of motion. Neck supple. No JVD present. No thyromegaly present.   Cardiovascular: Normal rate, regular rhythm, normal heart sounds and intact distal pulses.    No murmur heard.  Pulmonary/Chest: Effort normal. No respiratory distress. He has decreased breath sounds in the right middle field, the right lower field and the left lower field. He has no wheezes. He exhibits no tenderness.    Diminished to RLL   Abdominal: Soft. Bowel sounds are normal. He exhibits ascites. He exhibits no distension. There is tenderness (mild). There is no rebound and no guarding.   Dressing to chevron.  Wd vac removed 1/12/18  Dependent edema present R flank; surrounding erythema   Musculoskeletal: Normal range of motion. He exhibits edema (2+ LE edema). He exhibits no tenderness.   Neurological: He is alert and oriented to person, place, and time. He has normal reflexes.   Skin: Skin is warm and dry. Capillary refill takes 2 to 3 seconds. He is not diaphoretic. No erythema.   jaundice   Psychiatric: He has a normal mood and affect. His behavior is normal. Judgment and thought content normal. His mood appears not anxious.   Depressed mood   Nursing note and vitals reviewed.      Laboratory:  Immunosuppressants         Stop Route Frequency     cycloSPORINE (NEORAL) 100 mg/mL microemulsion solution 175 mg      -- Oral 2 times daily        CBC:     Recent Labs  Lab 02/09/18  0724   WBC 4.28   RBC 2.81*   HGB 8.7*   HCT 26.3*      MCV 94   MCH 31.0   MCHC 33.1     CMP:     Recent Labs  Lab 02/09/18  0724   GLU 99   CALCIUM 9.3   ALBUMIN 1.7*   PROT 5.4*      K 4.4   CO2 21*      BUN 27*   CREATININE 4.7*   ALKPHOS 104   ALT <5*   AST 22   BILITOT 1.9*     Labs within the past 24 hours have been reviewed.    Diagnostic Results:  I have personally reviewed all pertinent imaging studies.

## 2018-02-09 NOTE — PLAN OF CARE
Problem: Patient Care Overview  Goal: Plan of Care Review  Outcome: Ongoing (interventions implemented as appropriate)  Patient tolerated well. UF 2097 ml. BP stable throughout treatment. Catheter flushed with NS and Heparin 1000 units/ml instilled to each cath lumen. Catheter clamped, capped and taped. Patient without complaint.

## 2018-02-09 NOTE — PLAN OF CARE
Problem: Patient Care Overview  Goal: Plan of Care Review  Patient is AAOx4. Bed low, locked, call bell within reach, non skid socks on. Patient educated to use call bell for assistance, verbalized understanding. Patient remains free from falls or injury so far this shift, ambulates with standby assistance and a walker.   TMAX 100.3 - continuing IV zosyn and vanc.   R nare SHANE - nocturnal tube feeds.   1/2 chevron incision dressing changed by wound care - mepilex dressing CDI.   Patient on regular diet, minimal appetite.   CR 3.4 - anuric - HD today - took off 2,097 cc.   Patient with 1 episode of nausea/emesis this AM.   Patient with no BM since 2/6/18, refusing PRN stool softeners and laxatives. Patient received PRN suppository.   Patient complained of abdominal pain twice so far this shift, PRN oxycodone admin per order.   See flow sheet for complete assessment details.

## 2018-02-09 NOTE — PROGRESS NOTES
Notified CHARLINE Camacho NP of the following:  Patient with episode of nausea and 300 cc of emesis (bile).   Patient running low grade temps over night, R side of abdomen is red and warm.   Patient currently in HD.   Will continue to monitor.

## 2018-02-09 NOTE — PLAN OF CARE
Problem: Patient Care Overview  Goal: Plan of Care Review  Outcome: Ongoing (interventions implemented as appropriate)  Pt has call bell in reach, non slip socks on, and bedrails up x2. Pt has mom at bedside. Pt has prn pain and nausea meds. Pt has HD MWF and labs q am. Pt encouraged to wash hands. Pt on iv antibiotics. Pt on dylan tube feeds at night. Pt comfortable in bed.

## 2018-02-09 NOTE — PROGRESS NOTES
[facility]  Liver Transplant  Progress Note    Patient Name: Jhonny Diana  MRN: [id number]  Admission Date: 2018  Hospital Length of Stay: 29 days  Code Status: Full Code  Primary Care Provider: Primary Doctor No  Post-Operative Day: 113    ORGAN:   LIVER  Disease Etiology: Acute Alcoholic Hepatitis  Donor Type:    - Brain Death  CDC High Risk:   No  Donor CMV Status:   Donor CMV Status: Positive  Donor HBcAB:   Negative  Donor HCV Status:   Negative  Whole or Partial: Whole Liver  Biliary Anastomosis: End to End  Arterial Anatomy: Standard  Subjective:     History of Present Illness:  Jhonny Diana is a 28 y/o male with past medical history of alcoholic cirrhosis.  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 Found to have peritonitis in November (WBC 5000, 75% PNM) neg for bile leak. He was treated initially with vanc/cefepime. Repeat cell counts  with some improvement (WBC 1400, 45% PNM). He has undergone multiple paracenteses as well as abscess drainage of perihepatic fluid collections and treated with antimicrobial therapy but no positive cultures. Of note, biliary stricture also identified and ERCP performed on 2017 with sphincterotomy and biliary stent placed. Liver tests still have not normalized despite intervention, bilirubin and AP remain elevated. Other pertinent PMH current wound vac in place 2/2 wound infection, malnutrition requiring TPN for short course and ongoing hypoalbuminemia, and seizure activity while on prograf and has since been switched to cyclosporine without reoccurrence.  He presented to the ER for fever, abdominal pain, and N/V. He reports fever (103) for 1  day prior. Overnight, he developed N/V, reports small amount of green emesis with new left sided pain. He also endorses worsening SOB with exertion. He was scheduled as an outpatient for follow up paracentesis and IR drainage of fluid collection. CXR in ER shows large pleural effusion with subsegmental atelectasis. Infectious work up initiated in ER. His ANC is 900. Broad spectrum antibiotics initiated in ED. He denies chest pain, palpitations, diarrhea, constipation, or back pain. Denies any sick contacts.    Hospital Course:  Thoracentesis (1.2L off), Paracentesis (1.6L off), and IR drainage of fluid collection 1/11, all fluids negative for infection. ID consulted. Broad spectrum antibiotics d/c'd 1/15. Chronically malnourished with poor PO intake, prealbumin 7. Shane tube placed 1/15 for tube feedings.     ERCP 1/17 with sludge and a biliary stone which was removed and stent exchanged.  Remained with n/v; therefore, TF remained on hold and TPN continued.   Pt with fever s/p ERCP on 1/17 which continued until 1/19.  Vanc/cefepime restarted.  Blood cx 1/17 and 1/18 NGTD.  ID reconsulted.  Fungal markers sent.  CT C/A/P obtained.  With large R pleural effusion and ascites- both drained 1/19 and negative for infx per cell count. Pt afebrile 1/20. Liver biopsy 1/23 - without rejection.  EGD 1/25 - unremarkable for source of GI symptoms.  Resume diet along with other GI recommendations. SHANE tube placed for tube feeds.       Interval History:  Had a low grade 100.4 over night.  Remains on vanc and zosyn. Taken to OR 2/7/18 where multiple fluid collections drained.  Fluid sent for cultures - pending.  ID following - will continue abx for now.  Complained of pain on left heel - xray completed which were unremarkable.  Transitioned to nightly tube feeds to encourage eating during the day.    Scheduled Meds:   albuterol-ipratropium 2.5mg-0.5mg/3mL  3 mL Nebulization Q4H WAKE    atovaquone  1,500 mg Oral Daily     cycloSPORINE  175 mg Oral BID    docusate sodium  100 mg Oral BID    epoetin maurisio (PROCRIT) injection  10,000 Units Intravenous Every Mon, Wed, Fri    heparin (porcine)  5,000 Units Subcutaneous Q8H    custom IVPB builder   Intravenous Q24H    levetiracetam oral soln  500 mg Oral BID    levothyroxine  75 mcg Oral Before breakfast    metoclopramide HCl  10 mg Oral QID (AC & HS)    omeprazole  40 mg Oral QAM    custom IVPB builder  2.25 g Intravenous Q12H    polyethylene glycol  17 g Oral BID    psyllium husk (aspartame)  3.4 g Oral BID    ursodiol  300 mg Oral TID    valganciclovir 50 mg/ml  200 mg Oral Every Mon, Wed, Fri     Continuous Infusions:  PRN Meds:sodium chloride, sodium chloride, sodium chloride, acetaminophen, bisacodyl, heparin (porcine), omnipaque, ondansetron, ondansetron, oxyCODONE, oxyCODONE, prochlorperazine, simethicone, sodium chloride 0.9%, sodium chloride 0.9%    Review of Systems   Constitutional: Positive for activity change, appetite change, fatigue and fever. Negative for chills.   HENT: Negative.  Negative for congestion and facial swelling.    Eyes: Negative for pain, discharge and visual disturbance.   Respiratory: Negative for cough, shortness of breath and wheezing.    Cardiovascular: Negative for chest pain and leg swelling.   Gastrointestinal: Positive for abdominal distention, abdominal pain, constipation and nausea. Negative for vomiting.   Genitourinary: Positive for decreased urine volume. Negative for difficulty urinating.        Anuric   Musculoskeletal: Negative for arthralgias, back pain and myalgias.   Skin: Positive for color change and wound. Negative for rash.   Allergic/Immunologic: Positive for immunocompromised state.   Neurological: Negative for dizziness, seizures, weakness and headaches.   Psychiatric/Behavioral: Positive for decreased concentration and dysphoric mood. Negative for confusion. The patient is not nervous/anxious.    All other systems  reviewed and are negative.    Objective:     Vital Signs (Most Recent):  Temp: 99.9 °F (37.7 °C) (02/09/18 1252)  Pulse: (!) 111 (02/09/18 1252)  Resp: 18 (02/09/18 1252)  BP: 123/66 (02/09/18 1252)  SpO2: (!) 94 % (02/09/18 1252) Vital Signs (24h Range):  Temp:  [99.8 °F (37.7 °C)-100.4 °F (38 °C)] 99.9 °F (37.7 °C)  Pulse:  [] 111  Resp:  [16-20] 18  SpO2:  [92 %-97 %] 94 %  BP: (113-144)/(58-86) 123/66     Weight: 70.9 kg (156 lb 4.9 oz)  Body mass index is 25.23 kg/m².    Intake/Output - Last 3 Shifts       02/07 0700 - 02/08 0659 02/08 0700 - 02/09 0659 02/09 0700 - 02/10 0659    P.O. 300 300     I.V. (mL/kg) 400 (5.7)      Blood       Other 600  600    NG/ 715 5    IV Piggyback 550 350     Total Intake(mL/kg) 2220 (31.7) 1365 (19.3) 605 (8.5)    Urine (mL/kg/hr) 60 (0) 0 (0)     Emesis/NG output 325 (0.2)      Drains 240 (0.1) 145 (0.1) 30 (0.1)    Other 2600 (1.5)  2697 (5.7)    Stool 0 (0)  0 (0)    Total Output 3225 145 2727    Net -1005 +1220 -2122           Urine Occurrence 0 x      Stool Occurrence 0 x  0 x          Physical Exam   Constitutional: He is oriented to person, place, and time. He appears well-developed. No distress.   Temporal and distal extremity muscle wasting   HENT:   Head: Normocephalic and atraumatic.   Mouth/Throat: No oropharyngeal exudate.   dylan tube   Eyes: EOM are normal. Pupils are equal, round, and reactive to light. Scleral icterus is present.   Neck: Normal range of motion. Neck supple. No JVD present. No thyromegaly present.   Cardiovascular: Normal rate, regular rhythm, normal heart sounds and intact distal pulses.    No murmur heard.  Pulmonary/Chest: Effort normal. No respiratory distress. He has decreased breath sounds in the right middle field, the right lower field and the left lower field. He has no wheezes. He exhibits no tenderness.   Diminished to RLL   Abdominal: Soft. Bowel sounds are normal. He exhibits ascites. He exhibits no distension. There is  tenderness (mild). There is no rebound and no guarding.   Dressing to chevron.  Wd vac removed 1/12/18  Dependent edema present R flank; surrounding erythema   Musculoskeletal: Normal range of motion. He exhibits edema (2+ LE edema). He exhibits no tenderness.   Neurological: He is alert and oriented to person, place, and time. He has normal reflexes.   Skin: Skin is warm and dry. Capillary refill takes 2 to 3 seconds. He is not diaphoretic. No erythema.   jaundice   Psychiatric: He has a normal mood and affect. His behavior is normal. Judgment and thought content normal. His mood appears not anxious.   Depressed mood   Nursing note and vitals reviewed.      Laboratory:  Immunosuppressants         Stop Route Frequency     cycloSPORINE (NEORAL) 100 mg/mL microemulsion solution 175 mg      -- Oral 2 times daily        CBC:     Recent Labs  Lab 02/09/18  0724   WBC 4.28   RBC 2.81*   HGB 8.7*   HCT 26.3*      MCV 94   MCH 31.0   MCHC 33.1     CMP:     Recent Labs  Lab 02/09/18  0724   GLU 99   CALCIUM 9.3   ALBUMIN 1.7*   PROT 5.4*      K 4.4   CO2 21*      BUN 27*   CREATININE 4.7*   ALKPHOS 104   ALT <5*   AST 22   BILITOT 1.9*     Labs within the past 24 hours have been reviewed.    Diagnostic Results:  I have personally reviewed all pertinent imaging studies.    Assessment/Plan:     Fever    - Pt afebrile now overnight.   - Started on vanc/cefepime for ERCP. Now transitioned to vanc/zosyn   - Blood cultures sent, prelim NGTD.   - thoracentesis 2/1 - fluid negative for infection  - atelectasis noted on ct scan - start breathing tx & CPT  - now cmv positive - started treatment 2/5/18  - taken to OR 2/7 for exp lap - fluid collections drained, fluid sent for analysis        Severe protein-calorie malnutrition    - Poor PO intake since transplant requiring short course of TPN during previous hospital stay.   - albumin remains decreased but appetite slowly improving per pt.   - Dietary consulted. Will  need to closely monitor PO intake.   - supplements also ordered.   - SHANE with tube feeding start 1/16. Stopped 1/17/18, was not tolerating. Morse removed 1/20.  - prealbumin 7 on 1/15.  - TPN started 1/16/18.   - Pt able to eat minimally 1/21 and 1/22 which is an improvement.  - gi consulted - EGD reviewed, increased ppi bid, miralax bid, and metamucil bid, decreased narcotics  - continue reglan, placed SHANE tube with tube feeds - transitioned to nightly feeds          Acute renal failure with tubular necrosis    - HD resumed on previous admission. Now anuric and dialyzes M-W-F.  - Nephrology following.    - HD Sat since held Friday for thora/para.  - HD transitioned to TTS schedule.  - ktm consulted for possible kidney transplant         Other cytomegaloviral diseases    - detected on last CMV PCR.   - case discussed with ID and will hold off on treatment at this time given low WBC.         Liver transplanted    - Post op course complicated by fevers and hyperbilirubinemia.  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  - AST/ALT normal. Tbili/alkphos remain elevated.  - Liver US 1/8 showed 3.9 cm complex fluid collection anterior to right lobe and moderate nonspecific complex ascites inferior to transplant.   - IR placed drain 1/11/18, cell count negative for infection.  - PBS consulted. PBS not comfortable proceeding with EUS with liver biopsy given fever and possible infectious process going on. ERCP 1/17 with stone and sludge. Pt placed on Actigall - treated with 10 day course of abx.  ID now signed off.  - Bili elevated - liver u/s 1/28  - Liver biopsy 1/23 without rejection.  - consulted PBS for ERCP - Choledocholithiasis was found along with sludge, stents placed 1/30  - T bili now improving.             Constipation    - Enema ordered 1/20 and 1/21 with BM  - Encourage ambulation.  - decrease narcotics  - d/c metamucil and miralax bid as makes patient nauseated  - increase reglan qid             Long-term use of immunosuppressant medication    - Maintenance IS with cyclosporine. MMF on hold for infections and neutropenia. Continue to check cyclosporine level daily. Assess for toxicity and adjust level as needed.        Prophylactic immunotherapy    - See long term use of immunosuppression.         Anemia of chronic disease    - Continue daily CBC.  - Previously transfused 1/30, 2/6, continue procrit  - Given 1 U PRBC 2/6 with response in H/H, now stable        Recurrent pleural effusion on right    - CXR in ER with large right pleural effusion with subjective c/o worsening SOB  - thoracentesis completed- 1200 ml removed.  SOB with significant improvement.   - Cell count reviewed and negative for infection.   - resp even and non labored.  O2 sat 94-98%.  - Repeat thora 1/19 negative for infection per cell count  - Reaccumulating fluid on xray 1/21.  - thoracentesis 1/31 - 1.4L removed, negative for infection        Biliary stricture of transplanted liver    - tbili with increase prompting ERCP 1/30 with stones and sludge, stents placed  - T bili now improving. Trend daily.         At risk for opportunistic infections    - CMV detected 2/1 now on tx dose valcyte  - bactrim held.          Delayed surgical wound healing    - wd vac removed 1/12/18.  Wound healing well. Aquacel AG and Mepilex border dressing applied via wound care recs.           Other ascites    - Paracentesis performed 1/11/18 with 1600 ml removed.    - fluid negative for infection.   - Repeat para 1/19 negative for infection per cell count  - no fluid seen for paracentesis on 1/23.        Nausea and vomiting    - h/o constipation, reports having regular BM's with bowel regimen  - vomiting episode x 1 at home. Anti-emetics ordered PRN.  - KUB repeated 1/14/18 given increased abdominal pain- mild, generalized bowel distention suggesting ileus.  Diet changed to clears as tolerated for bowel rest   - SHANE tube placed 1/16 for tube feeds. Not  tolerating tube feeds, stopped 1/17/18.  - D/C dylan 1/19, now in place as of 1/25.  - chronic constipation, Cont bowel regimen.   - GI consulted. See protein calorie malnutrition              VTE Risk Mitigation         Ordered     heparin (porcine) injection 1,000 Units  As needed (PRN)     Route:  Intra-Catheter        02/05/18 0945     heparin (porcine) injection 5,000 Units  Every 8 hours     Route:  Subcutaneous        01/31/18 0957     Medium Risk of VTE  Once      01/11/18 0351     Place sequential compression device  Until discontinued      01/11/18 0351          The patients clinical status was discussed at multidisplinary rounds, involving transplant surgery, transplant medicine, pharmacy, nursing, nutrition, and social work    Discharge Planning:  Monitor kidney function --> may need outpt HD  Monitor HH needs vs rehab for deconditioned status      Seda Camacho, NP  Liver Transplant  [facility]

## 2018-02-09 NOTE — PROGRESS NOTES
Patient arrived via wc and weighed in bed. 0836 HD initiated per md order to Right IJ permcath without difficulty. Lines secured. Pt c/o pain when asked. Pain med given as ordered. Monitoring.

## 2018-02-09 NOTE — PROGRESS NOTES
Patient transferred to HD per wheelchair by transport aide.   No acute distress noted. Report called to receiving nurse.   Will monitor for return.

## 2018-02-10 PROBLEM — J90 PLEURAL EFFUSION, RIGHT: Status: RESOLVED | Noted: 2018-02-02 | Resolved: 2018-02-10

## 2018-02-10 LAB
ALBUMIN SERPL BCP-MCNC: 1.7 G/DL
ALP SERPL-CCNC: 96 U/L
ALT SERPL W/O P-5'-P-CCNC: <5 U/L
ANION GAP SERPL CALC-SCNC: 10 MMOL/L
AST SERPL-CCNC: 17 U/L
BACTERIA SPEC AEROBE CULT: NO GROWTH
BASOPHILS # BLD AUTO: 0.03 K/UL
BASOPHILS NFR BLD: 0.9 %
BILIRUB SERPL-MCNC: 2 MG/DL
BUN SERPL-MCNC: 16 MG/DL
CALCIUM SERPL-MCNC: 8.5 MG/DL
CHLORIDE SERPL-SCNC: 100 MMOL/L
CO2 SERPL-SCNC: 26 MMOL/L
CREAT SERPL-MCNC: 3.4 MG/DL
CYCLOSPORINE BLD LC/MS/MS-MCNC: 278 NG/ML
DIFFERENTIAL METHOD: ABNORMAL
EOSINOPHIL # BLD AUTO: 0.3 K/UL
EOSINOPHIL NFR BLD: 7.7 %
ERYTHROCYTE [DISTWIDTH] IN BLOOD BY AUTOMATED COUNT: 14.5 %
EST. GFR  (AFRICAN AMERICAN): 26.8 ML/MIN/1.73 M^2
EST. GFR  (NON AFRICAN AMERICAN): 23.2 ML/MIN/1.73 M^2
GLUCOSE SERPL-MCNC: 113 MG/DL
HCT VFR BLD AUTO: 23.4 %
HGB BLD-MCNC: 7.7 G/DL
IMM GRANULOCYTES # BLD AUTO: 0.04 K/UL
IMM GRANULOCYTES NFR BLD AUTO: 1.2 %
LYMPHOCYTES # BLD AUTO: 0.8 K/UL
LYMPHOCYTES NFR BLD: 23 %
MAGNESIUM SERPL-MCNC: 1.6 MG/DL
MCH RBC QN AUTO: 31 PG
MCHC RBC AUTO-ENTMCNC: 32.9 G/DL
MCV RBC AUTO: 94 FL
MONOCYTES # BLD AUTO: 0.5 K/UL
MONOCYTES NFR BLD: 15.3 %
NEUTROPHILS # BLD AUTO: 1.7 K/UL
NEUTROPHILS NFR BLD: 51.9 %
NRBC BLD-RTO: 0 /100 WBC
PHOSPHATE SERPL-MCNC: 2.6 MG/DL
PLATELET # BLD AUTO: 222 K/UL
PMV BLD AUTO: 10.7 FL
POTASSIUM SERPL-SCNC: 3.4 MMOL/L
PROT SERPL-MCNC: 5.3 G/DL
RBC # BLD AUTO: 2.48 M/UL
SODIUM SERPL-SCNC: 136 MMOL/L
VANCOMYCIN SERPL-MCNC: 16.1 UG/ML
WBC # BLD AUTO: 3.26 K/UL

## 2018-02-10 PROCEDURE — 25000003 PHARM REV CODE 250: Performed by: SURGERY

## 2018-02-10 PROCEDURE — 63600175 PHARM REV CODE 636 W HCPCS: Performed by: NURSE PRACTITIONER

## 2018-02-10 PROCEDURE — 20600001 HC STEP DOWN PRIVATE ROOM

## 2018-02-10 PROCEDURE — 63600175 PHARM REV CODE 636 W HCPCS: Mod: JG | Performed by: NURSE PRACTITIONER

## 2018-02-10 PROCEDURE — 25000003 PHARM REV CODE 250: Performed by: NURSE PRACTITIONER

## 2018-02-10 PROCEDURE — 63600175 PHARM REV CODE 636 W HCPCS: Performed by: PHYSICIAN ASSISTANT

## 2018-02-10 PROCEDURE — 85025 COMPLETE CBC W/AUTO DIFF WBC: CPT

## 2018-02-10 PROCEDURE — P9047 ALBUMIN (HUMAN), 25%, 50ML: HCPCS | Mod: JG | Performed by: NURSE PRACTITIONER

## 2018-02-10 PROCEDURE — 80053 COMPREHEN METABOLIC PANEL: CPT

## 2018-02-10 PROCEDURE — 80158 DRUG ASSAY CYCLOSPORINE: CPT

## 2018-02-10 PROCEDURE — 36415 COLL VENOUS BLD VENIPUNCTURE: CPT

## 2018-02-10 PROCEDURE — 25000242 PHARM REV CODE 250 ALT 637 W/ HCPCS: Performed by: NURSE PRACTITIONER

## 2018-02-10 PROCEDURE — 94664 DEMO&/EVAL PT USE INHALER: CPT

## 2018-02-10 PROCEDURE — 94640 AIRWAY INHALATION TREATMENT: CPT

## 2018-02-10 PROCEDURE — 99233 SBSQ HOSP IP/OBS HIGH 50: CPT | Mod: 24,,, | Performed by: NURSE PRACTITIONER

## 2018-02-10 PROCEDURE — 80202 ASSAY OF VANCOMYCIN: CPT

## 2018-02-10 PROCEDURE — 63600175 PHARM REV CODE 636 W HCPCS: Mod: JG | Performed by: SURGERY

## 2018-02-10 PROCEDURE — 83735 ASSAY OF MAGNESIUM: CPT

## 2018-02-10 PROCEDURE — 84100 ASSAY OF PHOSPHORUS: CPT

## 2018-02-10 PROCEDURE — 99232 SBSQ HOSP IP/OBS MODERATE 35: CPT | Mod: ,,, | Performed by: INTERNAL MEDICINE

## 2018-02-10 PROCEDURE — 99900035 HC TECH TIME PER 15 MIN (STAT)

## 2018-02-10 RX ORDER — ALBUMIN HUMAN 250 G/1000ML
25 SOLUTION INTRAVENOUS
Status: COMPLETED | OUTPATIENT
Start: 2018-02-10 | End: 2018-02-11

## 2018-02-10 RX ADMIN — ONDANSETRON HYDROCHLORIDE 4 MG: 2 INJECTION, SOLUTION INTRAMUSCULAR; INTRAVENOUS at 01:02

## 2018-02-10 RX ADMIN — IPRATROPIUM BROMIDE AND ALBUTEROL SULFATE 3 ML: .5; 3 SOLUTION RESPIRATORY (INHALATION) at 12:02

## 2018-02-10 RX ADMIN — OXYCODONE HYDROCHLORIDE 10 MG: 5 TABLET ORAL at 05:02

## 2018-02-10 RX ADMIN — URSODIOL 300 MG: 300 CAPSULE ORAL at 02:02

## 2018-02-10 RX ADMIN — LEVETIRACETAM 500 MG: 100 SOLUTION ORAL at 09:02

## 2018-02-10 RX ADMIN — LEVOTHYROXINE SODIUM 75 MCG: 75 TABLET ORAL at 06:02

## 2018-02-10 RX ADMIN — ONDANSETRON HYDROCHLORIDE 4 MG: 2 INJECTION, SOLUTION INTRAMUSCULAR; INTRAVENOUS at 09:02

## 2018-02-10 RX ADMIN — Medication 1000 MG: at 09:02

## 2018-02-10 RX ADMIN — ALBUMIN (HUMAN) 25 G: 12.5 SOLUTION INTRAVENOUS at 04:02

## 2018-02-10 RX ADMIN — METOCLOPRAMIDE HYDROCHLORIDE 10 MG: 5 SOLUTION ORAL at 08:02

## 2018-02-10 RX ADMIN — PIPERACILLIN AND TAZOBACTAM 2.25 G: 4; .5 INJECTION, POWDER, LYOPHILIZED, FOR SOLUTION INTRAVENOUS; PARENTERAL at 11:02

## 2018-02-10 RX ADMIN — HEPARIN SODIUM 5000 UNITS: 5000 INJECTION, SOLUTION INTRAVENOUS; SUBCUTANEOUS at 02:02

## 2018-02-10 RX ADMIN — OMEPRAZOLE 40 MG: 40 CAPSULE, DELAYED RELEASE ORAL at 06:02

## 2018-02-10 RX ADMIN — OXYCODONE HYDROCHLORIDE 10 MG: 5 TABLET ORAL at 09:02

## 2018-02-10 RX ADMIN — SODIUM CHLORIDE: 9 INJECTION, SOLUTION INTRAVENOUS at 01:02

## 2018-02-10 RX ADMIN — METOCLOPRAMIDE HYDROCHLORIDE 10 MG: 5 SOLUTION ORAL at 04:02

## 2018-02-10 RX ADMIN — OXYCODONE HYDROCHLORIDE 10 MG: 5 TABLET ORAL at 11:02

## 2018-02-10 RX ADMIN — IPRATROPIUM BROMIDE AND ALBUTEROL SULFATE 3 ML: .5; 3 SOLUTION RESPIRATORY (INHALATION) at 05:02

## 2018-02-10 RX ADMIN — LEVETIRACETAM 500 MG: 100 SOLUTION ORAL at 08:02

## 2018-02-10 RX ADMIN — URSODIOL 300 MG: 300 CAPSULE ORAL at 08:02

## 2018-02-10 RX ADMIN — ALBUMIN (HUMAN) 25 G: 12.5 SOLUTION INTRAVENOUS at 09:02

## 2018-02-10 RX ADMIN — IPRATROPIUM BROMIDE AND ALBUTEROL SULFATE 3 ML: .5; 3 SOLUTION RESPIRATORY (INHALATION) at 07:02

## 2018-02-10 RX ADMIN — IPRATROPIUM BROMIDE AND ALBUTEROL SULFATE 3 ML: .5; 3 SOLUTION RESPIRATORY (INHALATION) at 09:02

## 2018-02-10 RX ADMIN — CYCLOSPORINE 175 MG: 100 SOLUTION ORAL at 05:02

## 2018-02-10 RX ADMIN — ATOVAQUONE 1500 MG: 750 SUSPENSION ORAL at 09:02

## 2018-02-10 RX ADMIN — METOCLOPRAMIDE HYDROCHLORIDE 10 MG: 5 SOLUTION ORAL at 06:02

## 2018-02-10 RX ADMIN — URSODIOL 300 MG: 300 CAPSULE ORAL at 06:02

## 2018-02-10 RX ADMIN — CYCLOSPORINE 175 MG: 100 SOLUTION ORAL at 09:02

## 2018-02-10 RX ADMIN — METOCLOPRAMIDE HYDROCHLORIDE 10 MG: 5 SOLUTION ORAL at 11:02

## 2018-02-10 RX ADMIN — OXYCODONE HYDROCHLORIDE 10 MG: 5 TABLET ORAL at 01:02

## 2018-02-10 RX ADMIN — ONDANSETRON HYDROCHLORIDE 4 MG: 2 INJECTION, SOLUTION INTRAMUSCULAR; INTRAVENOUS at 11:02

## 2018-02-10 RX ADMIN — OXYCODONE HYDROCHLORIDE 10 MG: 5 TABLET ORAL at 07:02

## 2018-02-10 NOTE — SUBJECTIVE & OBJECTIVE
Interval History: Still notes nausea and some distension (open wound on lateral edge). Likely walled off bilious fluid from prior bile leak noted in OR on ex-lap. OR cultures NGTD. Afebrile.    Review of Systems   Constitutional: Negative for fever.   Cardiovascular: Positive for leg swelling.   Gastrointestinal: Positive for abdominal pain and nausea. Negative for diarrhea and vomiting.   Genitourinary:        Anuric     Allergic/Immunologic: Positive for immunocompromised state.     Objective:     Vital Signs (Most Recent):  Temp: 100 °F (37.8 °C) (02/09/18 1949)  Pulse: 104 (02/09/18 1949)  Resp: 18 (02/09/18 1949)  BP: 123/70 (02/09/18 1949)  SpO2: (!) 94 % (02/09/18 1949) Vital Signs (24h Range):  Temp:  [99.3 °F (37.4 °C)-100.5 °F (38.1 °C)] 100 °F (37.8 °C)  Pulse:  [] 104  Resp:  [12-20] 18  SpO2:  [90 %-95 %] 94 %  BP: (121-144)/(58-86) 123/70     Weight: 70.9 kg (156 lb 4.9 oz)  Body mass index is 25.23 kg/m².    Estimated Creatinine Clearance: 21.1 mL/min (based on SCr of 4.7 mg/dL (H)).    Physical Exam   Constitutional: He is oriented to person, place, and time. He appears well-developed. No distress.   Temporal and distal extremity muscle wasting   HENT:   Head: Normocephalic and atraumatic.   Mouth/Throat: No oropharyngeal exudate.   Eyes: EOM are normal. Pupils are equal, round, and reactive to light. No scleral icterus.   Neck: Normal range of motion. Neck supple. No JVD present. No thyromegaly present.   Cardiovascular: Normal rate, regular rhythm, normal heart sounds and intact distal pulses.    No murmur heard.  Pulmonary/Chest: Effort normal. No respiratory distress. He has decreased breath sounds in the right middle field, the right lower field and the left lower field. He has no wheezes. He exhibits no tenderness.   Diminished to RLL   Abdominal: Soft. Bowel sounds are normal. He exhibits distension and ascites. There is tenderness (mild). There is no rebound and no guarding.   Exlap  incision dressed. Leakage of ascites noted.   Musculoskeletal: Normal range of motion. He exhibits edema (2+ LE edema). He exhibits no tenderness.   Neurological: He is alert and oriented to person, place, and time. He has normal reflexes.   Skin: Skin is warm and dry. He is not diaphoretic. No erythema.   Psychiatric: He has a normal mood and affect. His behavior is normal. Judgment and thought content normal. His mood appears not anxious.   Nursing note and vitals reviewed.      Significant Labs:   CBC:     Recent Labs  Lab 02/08/18  0528 02/09/18  0724   WBC 4.25 4.28   HGB 8.2* 8.7*   HCT 25.7* 26.3*    255     CMP:     Recent Labs  Lab 02/08/18 0528 02/09/18  0724    136   K 4.2 4.4    104   CO2 20* 21*   GLU 76 99   BUN 17 27*   CREATININE 3.4* 4.7*   CALCIUM 8.9 9.3   PROT 5.2* 5.4*   ALBUMIN 1.7* 1.7*   BILITOT 2.1* 1.9*   ALKPHOS 96 104   AST 16 22   ALT <5* <5*   ANIONGAP 12 11   EGFRNONAA 23.2* 15.7*       Significant Imaging: I have reviewed all pertinent imaging results/findings within the past 24 hours.     CT CAP:   Multifocal patchy areas of consolidation in the left lung with a more confluent consolidation or atelectasis in the right lung.   Large right and small left pleural effusions which create local compressive atelectasis.  Simple collection about the inferior margin of the liver similar to the prior ultrasound.  Other incidental findings including biliary stent placement, pneumobilia, cardiomegaly, right and left sided central venous catheters, and body wall anasarca.    Microbiology:  1/11 blood cx: negative  1/11 pleural fluid cx: negative  1/11 ascites cx: negative  1/17 blood cx: negative  1/18 blood cx: negative  1/19 pleural fluid cx: NGTD  1/19 ascites cx: NGTD  2/8 OR cx: NGTD

## 2018-02-10 NOTE — PLAN OF CARE
Problem: Patient Care Overview  Goal: Plan of Care Review  Outcome: Ongoing (interventions implemented as appropriate)  Patient awake, alert, and oriented. ambulates with assistance from mother. Has poor appetite and states constipation. Will continue to monitor.

## 2018-02-10 NOTE — SUBJECTIVE & OBJECTIVE
Scheduled Meds:   albumin human 25%  25 g Intravenous Q8H    albuterol-ipratropium 2.5mg-0.5mg/3mL  3 mL Nebulization Q4H WAKE    atovaquone  1,500 mg Oral Daily    cycloSPORINE  175 mg Oral BID    docusate sodium  100 mg Oral BID    epoetin maurisio (PROCRIT) injection  10,000 Units Intravenous Every Mon, Wed, Fri    heparin (porcine)  5,000 Units Subcutaneous Q8H    custom IVPB builder   Intravenous Q24H    levetiracetam oral soln  500 mg Oral BID    levothyroxine  75 mcg Oral Before breakfast    metoclopramide HCl  10 mg Oral QID (AC & HS)    omeprazole  40 mg Oral QAM    custom IVPB builder  2.25 g Intravenous Q12H    polyethylene glycol  17 g Oral BID    psyllium husk (aspartame)  3.4 g Oral BID    ursodiol  300 mg Oral TID    valganciclovir 50 mg/ml  200 mg Oral Every Mon, Wed, Fri     Continuous Infusions:  PRN Meds:acetaminophen, bisacodyl, heparin (porcine), omnipaque, ondansetron, ondansetron, oxyCODONE, oxyCODONE, prochlorperazine, simethicone, sodium chloride 0.9%, sodium chloride 0.9%    Review of Systems   Constitutional: Positive for activity change, appetite change, fatigue and fever. Negative for chills.   HENT: Negative.  Negative for congestion and facial swelling.    Eyes: Negative for pain, discharge and visual disturbance.   Respiratory: Negative for cough, chest tightness, shortness of breath and wheezing.    Cardiovascular: Negative for chest pain, palpitations and leg swelling.   Gastrointestinal: Positive for abdominal distention, abdominal pain, constipation and nausea. Negative for vomiting.   Endocrine: Negative.    Genitourinary: Positive for decreased urine volume. Negative for difficulty urinating.        Anuric   Musculoskeletal: Negative for arthralgias, back pain and myalgias.   Skin: Positive for color change and wound. Negative for rash.   Allergic/Immunologic: Positive for immunocompromised state.   Neurological: Negative for dizziness, seizures, weakness and  headaches.   Psychiatric/Behavioral: Positive for decreased concentration and dysphoric mood. Negative for confusion. The patient is not nervous/anxious.    All other systems reviewed and are negative.    Objective:     Vital Signs (Most Recent):  Temp: 100.3 °F (37.9 °C) (02/10/18 0745)  Pulse: 91 (02/10/18 0932)  Resp: 19 (02/10/18 0932)  BP: 113/71 (02/10/18 0745)  SpO2: 96 % (02/10/18 0932) Vital Signs (24h Range):  Temp:  [99.3 °F (37.4 °C)-101.3 °F (38.5 °C)] 100.3 °F (37.9 °C)  Pulse:  [] 91  Resp:  [12-20] 19  SpO2:  [90 %-96 %] 96 %  BP: (113-137)/(64-73) 113/71     Weight: 69.5 kg (153 lb 3.5 oz)  Body mass index is 24.73 kg/m².    Intake/Output - Last 3 Shifts       02/08 0700 - 02/09 0659 02/09 0700 - 02/10 0659 02/10 0700 - 02/11 0659    P.O. 300 180     I.V. (mL/kg)       Other  600     NG/ 505     IV Piggyback 350 350     Total Intake(mL/kg) 1365 (19.3) 1635 (23.5)     Urine (mL/kg/hr) 0 (0) 100 (0.1)     Emesis/NG output       Drains 145 (0.1) 55 (0)     Other  2697 (1.6)     Stool  0 (0)     Total Output 145 2852      Net +1220 -1217             Stool Occurrence  1 x           Physical Exam   Constitutional: He is oriented to person, place, and time. He appears well-developed. No distress.   Temporal and distal extremity muscle wasting   HENT:   Head: Normocephalic and atraumatic.   Mouth/Throat: No oropharyngeal exudate.   dylan tube   Eyes: EOM are normal. Pupils are equal, round, and reactive to light. Scleral icterus is present.   Neck: Normal range of motion. Neck supple. No JVD present. No thyromegaly present.   Cardiovascular: Normal rate, regular rhythm, normal heart sounds and intact distal pulses.    No murmur heard.  Pulmonary/Chest: Effort normal. No respiratory distress. He has decreased breath sounds in the right middle field, the right lower field and the left lower field. He has no wheezes. He exhibits no tenderness.   Diminished to RLL   Abdominal: Soft. Bowel sounds are  normal. He exhibits ascites. He exhibits no distension. There is tenderness (mild). There is no rebound and no guarding.   Dressing to chevron.  Wd vac removed 1/12/18  Dependent edema present R flank; surrounding erythema   Musculoskeletal: Normal range of motion. He exhibits edema (2+ LE edema). He exhibits no tenderness.   Neurological: He is alert and oriented to person, place, and time. He has normal reflexes.   Skin: Skin is warm and dry. Capillary refill takes 2 to 3 seconds. He is not diaphoretic. No erythema.   jaundice   Psychiatric: He has a normal mood and affect. His behavior is normal. Judgment and thought content normal. His mood appears not anxious.   Depressed mood   Nursing note and vitals reviewed.      Laboratory:  Immunosuppressants         Stop Route Frequency     cycloSPORINE (NEORAL) 100 mg/mL microemulsion solution 175 mg      -- Oral 2 times daily        CBC:   Recent Labs  Lab 02/10/18  0402   WBC 3.26*   RBC 2.48*   HGB 7.7*   HCT 23.4*      MCV 94   MCH 31.0   MCHC 32.9     CMP:   Recent Labs  Lab 02/10/18  0402   *   CALCIUM 8.5*   ALBUMIN 1.7*   PROT 5.3*      K 3.4*   CO2 26      BUN 16   CREATININE 3.4*   ALKPHOS 96   ALT <5*   AST 17   BILITOT 2.0*     Labs within the past 24 hours have been reviewed.    Diagnostic Results:  I have personally reviewed all pertinent imaging studies.

## 2018-02-10 NOTE — ASSESSMENT & PLAN NOTE
- Poor PO intake since transplant requiring short course of TPN during previous hospital stay.   - albumin remains decreased but appetite slowly improving per pt.   - Dietary consulted. Will need to closely monitor PO intake.   - supplements also ordered.   - SHANE with tube feeding start 1/16. Stopped 1/17/18, was not tolerating. Shane removed 1/20.  - prealbumin 7 on 1/15.  - TPN started 1/16/18.   - Pt able to eat minimally 1/21 and 1/22 which is an improvement.  - gi consulted - EGD reviewed, increased ppi bid, miralax bid, and metamucil bid, decreased narcotics.  - continue reglan, placed SHANE tube with tube feeds - transitioned to nightly feeds.  - Albumin 25% x 3 doses ordered.

## 2018-02-10 NOTE — ASSESSMENT & PLAN NOTE
- Tmax 101.3 overnight.   - Started on vanc/cefepime for ERCP. Now transitioned to vanc/zosyn.  - Blood cultures sent, prelim NGTD. Repeat blood cultures and chest xray today 2/10.  - thoracentesis 2/1 - fluid negative for infection.  - atelectasis noted on ct scan - start breathing tx & CPT.  - now cmv positive - started treatment 2/5/18.  - taken to OR 2/7 for exp lap - fluid collections drained, cultures pending.

## 2018-02-10 NOTE — ASSESSMENT & PLAN NOTE
- HD resumed on previous admission. Now anuric and dialyzes M-W-F.  - Nephrology following.    - HD Sat since held Friday for thora/para.  - ktm consulted for possible kidney transplant.

## 2018-02-10 NOTE — ASSESSMENT & PLAN NOTE
- Enema ordered 1/20 and 1/21 with BM.  - Encourage ambulation.  - decrease narcotics.  - d/c metamucil and miralax bid as makes patient nauseated.  - increase reglan qid.

## 2018-02-10 NOTE — ASSESSMENT & PLAN NOTE
- detected at 370 on CMV PCR 2/1.  - case discussed with ID and will hold off on treatment at this time given low WBC.   - undetected CMV PCR 2/8.  - continue Valcyte 200 mg every Mon, Wed, Fri.

## 2018-02-10 NOTE — PROGRESS NOTES
650 mg Tylenol given for oral temp below.    Addendum 0600: Pt refused the Tylenol, pills were still at bedside this morning.     02/10/18 0107   Vital Signs   Temp (!) 101.3 °F (38.5 °C)   Temp src Oral

## 2018-02-10 NOTE — ASSESSMENT & PLAN NOTE
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+, steroid induction, on maintenance tacro/MMF/pred; c/b seizures, LAURA, superificial wound infection s/p wound vac to [facility] through 1/12, and several recent admissions on 11/24 and 12/3 with culture negative peritonitis with peak WBC ~5k due to suspected indolent biliary leakage s/p sphincterotomy/biliary stent placement over CBD stricture on 12/6 ERCP although notably with no overt leak noted during procedure) who was admitted on 1/11/2017 with acute onset fevers and acute on chronic N/V/abdominal pain (RLQ worst) in the setting of persistent noninflammatory ascites of unknown etiology. He underwent repeated drainage of pleural fluid and ascites that did not show evidence of gross infection and given that fevers were antibiotic responsive and walled off biloma was suspected, he underwent ex-lap on 2/8 where a walled off bilious collection was noted. He remains tenuous post-operatively after washout.    - would continue empiric coverage and if cultures from OR are negative tomorrow still, can transition to oral augmentin to complete an additional 10 days

## 2018-02-10 NOTE — ASSESSMENT & PLAN NOTE
- Post op course complicated by fevers and hyperbilirubinemia.  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  - AST/ALT normal. Tbili/alkphos remain elevated.  - Liver US 1/8 showed 3.9 cm complex fluid collection anterior to right lobe and moderate nonspecific complex ascites inferior to transplant.   - IR placed drain 1/11/18, cell count negative for infection.  - PBS consulted. PBS not comfortable proceeding with EUS with liver biopsy given fever and possible infectious process going on. ERCP 1/17 with stone and sludge. Pt placed on Actigall - treated with 10 day course of abx.  ID now signed off.  - Bili elevated - liver u/s 1/28  - Liver biopsy 1/23 without rejection.  - consulted PBS for ERCP - Choledocholithiasis was found along with sludge, stents placed 1/30.  - T bili 2 today, slightly increasing daily. Monitor.

## 2018-02-10 NOTE — ASSESSMENT & PLAN NOTE
- Paracentesis performed 1/11/18 with 1600 ml removed.    - fluid negative for infection.   - Repeat para 1/19 negative for infection per cell count.  - no fluid seen for paracentesis on 1/23.

## 2018-02-10 NOTE — ASSESSMENT & PLAN NOTE
- CXR in ER with large right pleural effusion with subjective c/o worsening SOB  - thoracentesis completed- 1200 ml removed.  SOB with significant improvement.   - Cell count reviewed and negative for infection.   - resp even and non labored.  O2 sat 94-98%.  - Repeat thora 1/19 negative for infection per cell count.  - Reaccumulating fluid on xray 1/21.  - thoracentesis 1/31 - 1.4L removed, negative for infection  - Reaccumulating fluid again on xray today 2/10. May need thoracentesis Monday 2/12.

## 2018-02-10 NOTE — PROGRESS NOTES
[facility]  Liver Transplant  Progress Note    Patient Name: Jhonny Diana  MRN: [id number]  Admission Date: 2018  Hospital Length of Stay: 30 days  Code Status: Full Code  Primary Care Provider: Primary Doctor No  Post-Operative Day: 114    ORGAN:   LIVER  Disease Etiology: Acute Alcoholic Hepatitis  Donor Type:    - Brain Death  CDC High Risk:   No  Donor CMV Status:   Donor CMV Status: Positive  Donor HBcAB:   Negative  Donor HCV Status:   Negative  Whole or Partial: Whole Liver  Biliary Anastomosis: End to End  Arterial Anatomy: Standard  Subjective:     History of Present Illness:  Jhonny Diana is a 26 y/o male with past medical history of alcoholic cirrhosis.  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 Found to have peritonitis in November (WBC 5000, 75% PNM) neg for bile leak. He was treated initially with vanc/cefepime. Repeat cell counts  with some improvement (WBC 1400, 45% PNM). He has undergone multiple paracenteses as well as abscess drainage of perihepatic fluid collections and treated with antimicrobial therapy but no positive cultures. Of note, biliary stricture also identified and ERCP performed on 2017 with sphincterotomy and biliary stent placed. Liver tests still have not normalized despite intervention, bilirubin and AP remain elevated. Other pertinent PMH current wound vac in place 2/2 wound infection, malnutrition requiring TPN for short course and ongoing hypoalbuminemia, and seizure activity while on prograf and has since been switched to cyclosporine without reoccurrence.  He presented to the ER for fever, abdominal pain, and N/V. He reports fever (103) for 1  day prior. Overnight, he developed N/V, reports small amount of green emesis with new left sided pain. He also endorses worsening SOB with exertion. He was scheduled as an outpatient for follow up paracentesis and IR drainage of fluid collection. CXR in ER shows large pleural effusion with subsegmental atelectasis. Infectious work up initiated in ER. His ANC is 900. Broad spectrum antibiotics initiated in ED. He denies chest pain, palpitations, diarrhea, constipation, or back pain. Denies any sick contacts.    Hospital Course:  Thoracentesis (1.2L off), Paracentesis (1.6L off), and IR drainage of fluid collection 1/11, all fluids negative for infection. ID consulted. Broad spectrum antibiotics d/c'd 1/15. Chronically malnourished with poor PO intake, prealbumin 7. Shane tube placed 1/15 for tube feedings.     ERCP 1/17 with sludge and a biliary stone which was removed and stent exchanged.  Remained with n/v; therefore, TF remained on hold and TPN continued.   Pt with fever s/p ERCP on 1/17 which continued until 1/19.  Vanc/cefepime restarted.  Blood cx 1/17 and 1/18 NGTD.  ID reconsulted.  Fungal markers sent.  CT C/A/P obtained.  With large R pleural effusion and ascites- both drained 1/19 and negative for infx per cell count. Pt afebrile 1/20. Liver biopsy 1/23 - without rejection.  EGD 1/25 - unremarkable for source of GI symptoms.  Resume diet along with other GI recommendations. SHANE tube placed for tube feeds.       Interval History:  Tmax overnight 101.3. Remains on Zosyn and Vanc. Chest xray and blood cultures ordered. Taken or OR 2/7/18, multiple collections drained. Fluid sent for culture pending. ID following. Transitioned to nocturnal TFs to encourage eating during the day. TF held for a short period overnight due to severe nausea. Resumed later and pt tolerated ok. Monitor.     Scheduled Meds:   albumin human 25%  25 g Intravenous Q8H    albuterol-ipratropium 2.5mg-0.5mg/3mL  3 mL Nebulization Q4H WAKE     atovaquone  1,500 mg Oral Daily    cycloSPORINE  175 mg Oral BID    docusate sodium  100 mg Oral BID    epoetin maurisio (PROCRIT) injection  10,000 Units Intravenous Every Mon, Wed, Fri    heparin (porcine)  5,000 Units Subcutaneous Q8H    custom IVPB builder   Intravenous Q24H    levetiracetam oral soln  500 mg Oral BID    levothyroxine  75 mcg Oral Before breakfast    metoclopramide HCl  10 mg Oral QID (AC & HS)    omeprazole  40 mg Oral QAM    custom IVPB builder  2.25 g Intravenous Q12H    polyethylene glycol  17 g Oral BID    psyllium husk (aspartame)  3.4 g Oral BID    ursodiol  300 mg Oral TID    valganciclovir 50 mg/ml  200 mg Oral Every Mon, Wed, Fri     Continuous Infusions:  PRN Meds:acetaminophen, bisacodyl, heparin (porcine), omnipaque, ondansetron, ondansetron, oxyCODONE, oxyCODONE, prochlorperazine, simethicone, sodium chloride 0.9%, sodium chloride 0.9%    Review of Systems   Constitutional: Positive for activity change, appetite change, fatigue and fever. Negative for chills.   HENT: Negative.  Negative for congestion and facial swelling.    Eyes: Negative for pain, discharge and visual disturbance.   Respiratory: Negative for cough, chest tightness, shortness of breath and wheezing.    Cardiovascular: Negative for chest pain, palpitations and leg swelling.   Gastrointestinal: Positive for abdominal distention, abdominal pain, constipation and nausea. Negative for vomiting.   Endocrine: Negative.    Genitourinary: Positive for decreased urine volume. Negative for difficulty urinating.        Anuric   Musculoskeletal: Negative for arthralgias, back pain and myalgias.   Skin: Positive for color change and wound. Negative for rash.   Allergic/Immunologic: Positive for immunocompromised state.   Neurological: Negative for dizziness, seizures, weakness and headaches.   Psychiatric/Behavioral: Positive for decreased concentration and dysphoric mood. Negative for confusion. The patient is  not nervous/anxious.    All other systems reviewed and are negative.    Objective:     Vital Signs (Most Recent):  Temp: 100.3 °F (37.9 °C) (02/10/18 0745)  Pulse: 91 (02/10/18 0932)  Resp: 19 (02/10/18 0932)  BP: 113/71 (02/10/18 0745)  SpO2: 96 % (02/10/18 0932) Vital Signs (24h Range):  Temp:  [99.3 °F (37.4 °C)-101.3 °F (38.5 °C)] 100.3 °F (37.9 °C)  Pulse:  [] 91  Resp:  [12-20] 19  SpO2:  [90 %-96 %] 96 %  BP: (113-137)/(64-73) 113/71     Weight: 69.5 kg (153 lb 3.5 oz)  Body mass index is 24.73 kg/m².    Intake/Output - Last 3 Shifts       02/08 0700 - 02/09 0659 02/09 0700 - 02/10 0659 02/10 0700 - 02/11 0659    P.O. 300 180     I.V. (mL/kg)       Other  600     NG/ 505     IV Piggyback 350 350     Total Intake(mL/kg) 1365 (19.3) 1635 (23.5)     Urine (mL/kg/hr) 0 (0) 100 (0.1)     Emesis/NG output       Drains 145 (0.1) 55 (0)     Other  2697 (1.6)     Stool  0 (0)     Total Output 145 2852      Net +1220 -1217             Stool Occurrence  1 x           Physical Exam   Constitutional: He is oriented to person, place, and time. He appears well-developed. No distress.   Temporal and distal extremity muscle wasting   HENT:   Head: Normocephalic and atraumatic.   Mouth/Throat: No oropharyngeal exudate.   dylan tube   Eyes: EOM are normal. Pupils are equal, round, and reactive to light. Scleral icterus is present.   Neck: Normal range of motion. Neck supple. No JVD present. No thyromegaly present.   Cardiovascular: Normal rate, regular rhythm, normal heart sounds and intact distal pulses.    No murmur heard.  Pulmonary/Chest: Effort normal. No respiratory distress. He has decreased breath sounds in the right middle field, the right lower field and the left lower field. He has no wheezes. He exhibits no tenderness.   Diminished to RLL   Abdominal: Soft. Bowel sounds are normal. He exhibits ascites. He exhibits no distension. There is tenderness (mild). There is no rebound and no guarding.   Dressing to  paul.  Wd vac removed 1/12/18  Dependent edema present R flank; surrounding erythema   Musculoskeletal: Normal range of motion. He exhibits edema (2+ LE edema). He exhibits no tenderness.   Neurological: He is alert and oriented to person, place, and time. He has normal reflexes.   Skin: Skin is warm and dry. Capillary refill takes 2 to 3 seconds. He is not diaphoretic. No erythema.   jaundice   Psychiatric: He has a normal mood and affect. His behavior is normal. Judgment and thought content normal. His mood appears not anxious.   Depressed mood   Nursing note and vitals reviewed.      Laboratory:  Immunosuppressants         Stop Route Frequency     cycloSPORINE (NEORAL) 100 mg/mL microemulsion solution 175 mg      -- Oral 2 times daily        CBC:   Recent Labs  Lab 02/10/18  0402   WBC 3.26*   RBC 2.48*   HGB 7.7*   HCT 23.4*      MCV 94   MCH 31.0   MCHC 32.9     CMP:   Recent Labs  Lab 02/10/18  0402   *   CALCIUM 8.5*   ALBUMIN 1.7*   PROT 5.3*      K 3.4*   CO2 26      BUN 16   CREATININE 3.4*   ALKPHOS 96   ALT <5*   AST 17   BILITOT 2.0*     Labs within the past 24 hours have been reviewed.    Diagnostic Results:  I have personally reviewed all pertinent imaging studies.    Assessment/Plan:     Delayed surgical wound healing    - wd vac removed 1/12/18.  Wound healing well. Aquacel AG and Mepilex border dressing applied via wound care recs.           Liver transplanted    - Post op course complicated by fevers and hyperbilirubinemia.  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  - AST/ALT normal. Tbili/alkphos remain elevated.  - Liver US 1/8 showed 3.9 cm complex fluid collection anterior to right lobe and moderate nonspecific complex ascites inferior to transplant.   - IR placed drain 1/11/18, cell count negative for infection.  - PBS consulted. PBS not comfortable proceeding with EUS with liver biopsy given fever and possible infectious process going on.  ERCP 1/17 with stone and sludge. Pt placed on Actigall - treated with 10 day course of abx.  ID now signed off.  - Bili elevated - liver u/s 1/28  - Liver biopsy 1/23 without rejection.  - consulted PBS for ERCP - Choledocholithiasis was found along with sludge, stents placed 1/30.  - T bili 2 today, slightly increasing daily. Monitor.             Long-term use of immunosuppressant medication    - Maintenance IS with cyclosporine. MMF on hold for infections and neutropenia. Continue to check cyclosporine level daily. Assess for toxicity and adjust level as needed.        Prophylactic immunotherapy    - See long term use of immunosuppression.         At risk for opportunistic infections    - CMV detected 2/1 now on tx dose valcyte.  - bactrim held.          Anemia of chronic disease    - Continue daily CBC.  - Previously transfused 1/30, 2/6, continue procrit  - Given 1 U PRBC 2/6 with response in H/H.  - H&H 7.7/23.4. No overt signs/symptoms of bleeding. Type & screen in AM.        Other cytomegaloviral diseases    - detected at 370 on CMV PCR 2/1.  - case discussed with ID and will hold off on treatment at this time given low WBC.   - undetected CMV PCR 2/8.  - continue Valcyte 200 mg every Mon, Wed, Fri.        Fever    - Tmax 101.3 overnight.   - Started on vanc/cefepime for ERCP. Now transitioned to vanc/zosyn.  - Blood cultures sent, prelim NGTD. Repeat blood cultures and chest xray today 2/10.  - thoracentesis 2/1 - fluid negative for infection.  - atelectasis noted on ct scan - start breathing tx & CPT.  - now cmv positive - started treatment 2/5/18.  - taken to OR 2/7 for exp lap - fluid collections drained, cultures pending.        Constipation    - Enema ordered 1/20 and 1/21 with BM.  - Encourage ambulation.  - decrease narcotics.  - d/c metamucil and miralax bid as makes patient nauseated.  - increase reglan qid.            Other ascites    - Paracentesis performed 1/11/18 with 1600 ml removed.    -  fluid negative for infection.   - Repeat para 1/19 negative for infection per cell count.  - no fluid seen for paracentesis on 1/23.        Severe protein-calorie malnutrition    - Poor PO intake since transplant requiring short course of TPN during previous hospital stay.   - albumin remains decreased but appetite slowly improving per pt.   - Dietary consulted. Will need to closely monitor PO intake.   - supplements also ordered.   - SHANE with tube feeding start 1/16. Stopped 1/17/18, was not tolerating. Shane removed 1/20.  - prealbumin 7 on 1/15.  - TPN started 1/16/18.   - Pt able to eat minimally 1/21 and 1/22 which is an improvement.  - gi consulted - EGD reviewed, increased ppi bid, miralax bid, and metamucil bid, decreased narcotics.  - continue reglan, placed SHANE tube with tube feeds - transitioned to nightly feeds.  - Albumin 25% x 3 doses ordered.           Nausea and vomiting    - h/o constipation, reports having regular BM's with bowel regimen.  - vomiting episode x 1 at home. Anti-emetics ordered PRN.  - KUB repeated 1/14/18 given increased abdominal pain- mild, generalized bowel distention suggesting ileus.  Diet changed to clears as tolerated for bowel rest .  - SHANE tube placed 1/16 for tube feeds. Not tolerating tube feeds, stopped 1/17/18.  - D/C shane 1/19, now in place as of 1/25.  - chronic constipation, Cont bowel regimen.   - GI consulted. See protein calorie malnutrition.          Recurrent pleural effusion on right    - CXR in ER with large right pleural effusion with subjective c/o worsening SOB  - thoracentesis completed- 1200 ml removed.  SOB with significant improvement.   - Cell count reviewed and negative for infection.   - resp even and non labored.  O2 sat 94-98%.  - Repeat thora 1/19 negative for infection per cell count.  - Reaccumulating fluid on xray 1/21.  - thoracentesis 1/31 - 1.4L removed, negative for infection  - Reaccumulating fluid again on xray today 2/10. May need  thoracentesis Monday 2/12.        Biliary stricture of transplanted liver    - tbili with increase prompting ERCP 1/30 with stones and sludge, stents placed.  - T bili now improving. Trend daily.         Acute renal failure with tubular necrosis    - HD resumed on previous admission. Now anuric and dialyzes M-W-F.  - Nephrology following.    - HD Sat since held Friday for thora/para.  - ktm consulted for possible kidney transplant.            VTE Risk Mitigation         Ordered     heparin (porcine) injection 1,000 Units  As needed (PRN)     Route:  Intra-Catheter        02/05/18 0945     heparin (porcine) injection 5,000 Units  Every 8 hours     Route:  Subcutaneous        01/31/18 0957     Medium Risk of VTE  Once      01/11/18 0351     Place sequential compression device  Until discontinued      01/11/18 0351          The patients clinical status was discussed at multidisplinary rounds, involving transplant surgery, transplant medicine, pharmacy, nursing, nutrition, and social work    Discharge Planning: not a candidate for dc at this time.  Monitor kidney function --> may need outpt HD  Monitor HH needs vs rehab for deconditioned status      Mandy Crespo NP  Liver Transplant  [facility]

## 2018-02-10 NOTE — PROGRESS NOTES
Pt c/o severe nausea - asked if tube feeding can be stopped (pt on nocturnal TF 8p-8a) - gave pt 4 mg of IV Zofran and told him the TF will be restarted in 30 minutes. Pt was agreeable to this.

## 2018-02-10 NOTE — PROGRESS NOTES
[facility]  Infectious Disease  Progress Note    Patient Name: Jhonny Diana  MRN: [id number]  Admission Date: 1/11/2018  Length of Stay: 30 days  Attending Physician: Nathanael Medina MD  Primary Care Provider: Primary Doctor No    Isolation Status: No active isolations  Assessment/Plan:      Fever    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, CMV D+/R+, steroid induction, on maintenance tacro/MMF/pred; c/b seizures, LAURA, superificial wound infection s/p wound vac to [facility] through 1/12, and several recent admissions on 11/24 and 12/3 with culture negative peritonitis with peak WBC ~5k due to suspected indolent biliary leakage s/p sphincterotomy/biliary stent placement over CBD stricture on 12/6 ERCP although notably with no overt leak noted during procedure) who was admitted on 1/11/2017 with acute onset fevers and acute on chronic N/V/abdominal pain (RLQ worst) in the setting of persistent noninflammatory ascites of unknown etiology. He underwent repeated drainage of pleural fluid and ascites that did not show evidence of gross infection and given that fevers were antibiotic responsive and walled off biloma was suspected, he underwent ex-lap on 2/8 where a walled off bilious collection was noted. He remains tenuous post-operatively after washout.    - would continue empiric coverage and if cultures from OR are negative tomorrow still, can transition to oral augmentin to complete an additional 10 days            Anticipated Disposition: pending improvement    Thank you for your consult. I will follow-up with patient. Please contact us if you have any additional questions.     Melanie Bergman MD  Transplant ID Attending  [id number]    Melanie Bergman MD  Infectious Disease  [facility]    Subjective:     Principal Problem:Fever    HPI: No notes on file  Interval History: Still notes nausea and some distension (open  wound on lateral edge). Likely walled off bilious fluid from prior bile leak noted in OR on ex-lap. OR cultures NGTD. Afebrile.    Review of Systems   Constitutional: Negative for fever.   Cardiovascular: Positive for leg swelling.   Gastrointestinal: Positive for abdominal pain and nausea. Negative for diarrhea and vomiting.   Genitourinary:        Anuric     Allergic/Immunologic: Positive for immunocompromised state.     Objective:     Vital Signs (Most Recent):  Temp: 100 °F (37.8 °C) (02/09/18 1949)  Pulse: 104 (02/09/18 1949)  Resp: 18 (02/09/18 1949)  BP: 123/70 (02/09/18 1949)  SpO2: (!) 94 % (02/09/18 1949) Vital Signs (24h Range):  Temp:  [99.3 °F (37.4 °C)-100.5 °F (38.1 °C)] 100 °F (37.8 °C)  Pulse:  [] 104  Resp:  [12-20] 18  SpO2:  [90 %-95 %] 94 %  BP: (121-144)/(58-86) 123/70     Weight: 70.9 kg (156 lb 4.9 oz)  Body mass index is 25.23 kg/m².    Estimated Creatinine Clearance: 21.1 mL/min (based on SCr of 4.7 mg/dL (H)).    Physical Exam   Constitutional: He is oriented to person, place, and time. He appears well-developed. No distress.   Temporal and distal extremity muscle wasting   HENT:   Head: Normocephalic and atraumatic.   Mouth/Throat: No oropharyngeal exudate.   Eyes: EOM are normal. Pupils are equal, round, and reactive to light. No scleral icterus.   Neck: Normal range of motion. Neck supple. No JVD present. No thyromegaly present.   Cardiovascular: Normal rate, regular rhythm, normal heart sounds and intact distal pulses.    No murmur heard.  Pulmonary/Chest: Effort normal. No respiratory distress. He has decreased breath sounds in the right middle field, the right lower field and the left lower field. He has no wheezes. He exhibits no tenderness.   Diminished to RLL   Abdominal: Soft. Bowel sounds are normal. He exhibits distension and ascites. There is tenderness (mild). There is no rebound and no guarding.   Exlap incision dressed. Leakage of ascites noted.   Musculoskeletal:  Normal range of motion. He exhibits edema (2+ LE edema). He exhibits no tenderness.   Neurological: He is alert and oriented to person, place, and time. He has normal reflexes.   Skin: Skin is warm and dry. He is not diaphoretic. No erythema.   Psychiatric: He has a normal mood and affect. His behavior is normal. Judgment and thought content normal. His mood appears not anxious.   Nursing note and vitals reviewed.      Significant Labs:   CBC:     Recent Labs  Lab 02/08/18  0528 02/09/18  0724   WBC 4.25 4.28   HGB 8.2* 8.7*   HCT 25.7* 26.3*    255     CMP:     Recent Labs  Lab 02/08/18  0528 02/09/18  0724    136   K 4.2 4.4    104   CO2 20* 21*   GLU 76 99   BUN 17 27*   CREATININE 3.4* 4.7*   CALCIUM 8.9 9.3   PROT 5.2* 5.4*   ALBUMIN 1.7* 1.7*   BILITOT 2.1* 1.9*   ALKPHOS 96 104   AST 16 22   ALT <5* <5*   ANIONGAP 12 11   EGFRNONAA 23.2* 15.7*       Significant Imaging: I have reviewed all pertinent imaging results/findings within the past 24 hours.     CT CAP:   Multifocal patchy areas of consolidation in the left lung with a more confluent consolidation or atelectasis in the right lung.   Large right and small left pleural effusions which create local compressive atelectasis.  Simple collection about the inferior margin of the liver similar to the prior ultrasound.  Other incidental findings including biliary stent placement, pneumobilia, cardiomegaly, right and left sided central venous catheters, and body wall anasarca.    Microbiology:  1/11 blood cx: negative  1/11 pleural fluid cx: negative  1/11 ascites cx: negative  1/17 blood cx: negative  1/18 blood cx: negative  1/19 pleural fluid cx: NGTD  1/19 ascites cx: NGTD  2/8 OR cx: NGTD

## 2018-02-10 NOTE — ASSESSMENT & PLAN NOTE
- Continue daily CBC.  - Previously transfused 1/30, 2/6, continue procrit  - Given 1 U PRBC 2/6 with response in H/H.  - H&H 7.7/23.4. No overt signs/symptoms of bleeding. Type & screen in AM.

## 2018-02-10 NOTE — ASSESSMENT & PLAN NOTE
- h/o constipation, reports having regular BM's with bowel regimen.  - vomiting episode x 1 at home. Anti-emetics ordered PRN.  - KUB repeated 1/14/18 given increased abdominal pain- mild, generalized bowel distention suggesting ileus.  Diet changed to clears as tolerated for bowel rest .  - SHANE tube placed 1/16 for tube feeds. Not tolerating tube feeds, stopped 1/17/18.  - D/C shane 1/19, now in place as of 1/25.  - chronic constipation, Cont bowel regimen.   - GI consulted. See protein calorie malnutrition.

## 2018-02-10 NOTE — PROGRESS NOTES
Follow up on chevron wound  New area of recently epithelized tissue has reopened, may be due to ascites . Cleaned and redressed with mepilex border dressing.  Continue same  Zoë Murphy RN CWON  [id number]

## 2018-02-10 NOTE — ASSESSMENT & PLAN NOTE
- tbili with increase prompting ERCP 1/30 with stones and sludge, stents placed.  - T bili now improving. Trend daily.

## 2018-02-11 PROBLEM — R10.9 ABDOMINAL PAIN: Status: RESOLVED | Noted: 2018-02-11 | Resolved: 2018-02-10

## 2018-02-11 PROBLEM — R17 ELEVATED BILIRUBIN: Status: ACTIVE | Noted: 2018-02-11

## 2018-02-11 LAB
ABO + RH BLD: NORMAL
ALBUMIN SERPL BCP-MCNC: 2.3 G/DL
ALP SERPL-CCNC: 84 U/L
ALT SERPL W/O P-5'-P-CCNC: <5 U/L
ANION GAP SERPL CALC-SCNC: 11 MMOL/L
ANISOCYTOSIS BLD QL SMEAR: SLIGHT
AST SERPL-CCNC: 12 U/L
BASOPHILS NFR BLD: 1 %
BILIRUB SERPL-MCNC: 1.9 MG/DL
BLD GP AB SCN CELLS X3 SERPL QL: NORMAL
BLD PROD TYP BPU: NORMAL
BLOOD UNIT EXPIRATION DATE: NORMAL
BLOOD UNIT TYPE CODE: 5100
BLOOD UNIT TYPE: NORMAL
BUN SERPL-MCNC: 24 MG/DL
CALCIUM SERPL-MCNC: 9.1 MG/DL
CHLORIDE SERPL-SCNC: 99 MMOL/L
CO2 SERPL-SCNC: 26 MMOL/L
CODING SYSTEM: NORMAL
CREAT SERPL-MCNC: 4.6 MG/DL
CYCLOSPORINE BLD LC/MS/MS-MCNC: 235 NG/ML
DIFFERENTIAL METHOD: ABNORMAL
DISPENSE STATUS: NORMAL
EOSINOPHIL NFR BLD: 5 %
ERYTHROCYTE [DISTWIDTH] IN BLOOD BY AUTOMATED COUNT: 14.4 %
EST. GFR  (AFRICAN AMERICAN): 18.6 ML/MIN/1.73 M^2
EST. GFR  (NON AFRICAN AMERICAN): 16.1 ML/MIN/1.73 M^2
GLUCOSE SERPL-MCNC: 103 MG/DL
HCT VFR BLD AUTO: 21.2 %
HGB BLD-MCNC: 7 G/DL
HYPOCHROMIA BLD QL SMEAR: ABNORMAL
IMM GRANULOCYTES # BLD AUTO: ABNORMAL K/UL
IMM GRANULOCYTES NFR BLD AUTO: ABNORMAL %
LYMPHOCYTES NFR BLD: 22 %
MAGNESIUM SERPL-MCNC: 1.9 MG/DL
MCH RBC QN AUTO: 31.3 PG
MCHC RBC AUTO-ENTMCNC: 33 G/DL
MCV RBC AUTO: 95 FL
MONOCYTES NFR BLD: 7 %
NEUTROPHILS NFR BLD: 65 %
NRBC BLD-RTO: 0 /100 WBC
OVALOCYTES BLD QL SMEAR: ABNORMAL
PHOSPHATE SERPL-MCNC: 3.8 MG/DL
PLATELET # BLD AUTO: 199 K/UL
PMV BLD AUTO: 10.1 FL
POIKILOCYTOSIS BLD QL SMEAR: SLIGHT
POLYCHROMASIA BLD QL SMEAR: ABNORMAL
POTASSIUM SERPL-SCNC: 3.2 MMOL/L
PROT SERPL-MCNC: 5.5 G/DL
RBC # BLD AUTO: 2.24 M/UL
SODIUM SERPL-SCNC: 136 MMOL/L
TRANS ERYTHROCYTES VOL PATIENT: NORMAL ML
WBC # BLD AUTO: 2.88 K/UL

## 2018-02-11 PROCEDURE — 80053 COMPREHEN METABOLIC PANEL: CPT

## 2018-02-11 PROCEDURE — 63600175 PHARM REV CODE 636 W HCPCS: Performed by: NURSE PRACTITIONER

## 2018-02-11 PROCEDURE — P9021 RED BLOOD CELLS UNIT: HCPCS

## 2018-02-11 PROCEDURE — 25000003 PHARM REV CODE 250: Performed by: SURGERY

## 2018-02-11 PROCEDURE — 63600175 PHARM REV CODE 636 W HCPCS: Mod: JG | Performed by: SURGERY

## 2018-02-11 PROCEDURE — 94640 AIRWAY INHALATION TREATMENT: CPT

## 2018-02-11 PROCEDURE — 99233 SBSQ HOSP IP/OBS HIGH 50: CPT | Mod: 24,,, | Performed by: NURSE PRACTITIONER

## 2018-02-11 PROCEDURE — 80158 DRUG ASSAY CYCLOSPORINE: CPT

## 2018-02-11 PROCEDURE — 25000003 PHARM REV CODE 250: Performed by: NURSE PRACTITIONER

## 2018-02-11 PROCEDURE — 83735 ASSAY OF MAGNESIUM: CPT

## 2018-02-11 PROCEDURE — 87040 BLOOD CULTURE FOR BACTERIA: CPT | Mod: 59

## 2018-02-11 PROCEDURE — 36430 TRANSFUSION BLD/BLD COMPNT: CPT

## 2018-02-11 PROCEDURE — 94668 MNPJ CHEST WALL SBSQ: CPT

## 2018-02-11 PROCEDURE — 86850 RBC ANTIBODY SCREEN: CPT

## 2018-02-11 PROCEDURE — 85007 BL SMEAR W/DIFF WBC COUNT: CPT

## 2018-02-11 PROCEDURE — 63600175 PHARM REV CODE 636 W HCPCS: Mod: JG | Performed by: NURSE PRACTITIONER

## 2018-02-11 PROCEDURE — 63600175 PHARM REV CODE 636 W HCPCS: Performed by: PHYSICIAN ASSISTANT

## 2018-02-11 PROCEDURE — 94664 DEMO&/EVAL PT USE INHALER: CPT

## 2018-02-11 PROCEDURE — 99900035 HC TECH TIME PER 15 MIN (STAT)

## 2018-02-11 PROCEDURE — 94761 N-INVAS EAR/PLS OXIMETRY MLT: CPT

## 2018-02-11 PROCEDURE — P9047 ALBUMIN (HUMAN), 25%, 50ML: HCPCS | Mod: JG | Performed by: NURSE PRACTITIONER

## 2018-02-11 PROCEDURE — 20600001 HC STEP DOWN PRIVATE ROOM

## 2018-02-11 PROCEDURE — 84100 ASSAY OF PHOSPHORUS: CPT

## 2018-02-11 PROCEDURE — 86920 COMPATIBILITY TEST SPIN: CPT

## 2018-02-11 PROCEDURE — 25000242 PHARM REV CODE 250 ALT 637 W/ HCPCS: Performed by: NURSE PRACTITIONER

## 2018-02-11 PROCEDURE — 85027 COMPLETE CBC AUTOMATED: CPT

## 2018-02-11 RX ORDER — SODIUM CHLORIDE 9 MG/ML
INJECTION, SOLUTION INTRAVENOUS
Status: DISCONTINUED | OUTPATIENT
Start: 2018-02-11 | End: 2018-02-16

## 2018-02-11 RX ORDER — HYDROCODONE BITARTRATE AND ACETAMINOPHEN 500; 5 MG/1; MG/1
TABLET ORAL
Status: DISCONTINUED | OUTPATIENT
Start: 2018-02-11 | End: 2018-02-24

## 2018-02-11 RX ORDER — AMOXICILLIN AND CLAVULANATE POTASSIUM 500; 125 MG/1; MG/1
1 TABLET, FILM COATED ORAL DAILY
Status: DISCONTINUED | OUTPATIENT
Start: 2018-02-12 | End: 2018-02-13

## 2018-02-11 RX ORDER — SODIUM CHLORIDE 9 MG/ML
INJECTION, SOLUTION INTRAVENOUS ONCE
Status: DISCONTINUED | OUTPATIENT
Start: 2018-02-11 | End: 2018-02-16

## 2018-02-11 RX ORDER — ALBUMIN HUMAN 250 G/1000ML
25 SOLUTION INTRAVENOUS ONCE
Status: COMPLETED | OUTPATIENT
Start: 2018-02-11 | End: 2018-02-11

## 2018-02-11 RX ADMIN — OMEPRAZOLE 40 MG: 40 CAPSULE, DELAYED RELEASE ORAL at 06:02

## 2018-02-11 RX ADMIN — METOCLOPRAMIDE HYDROCHLORIDE 10 MG: 5 SOLUTION ORAL at 09:02

## 2018-02-11 RX ADMIN — LEVETIRACETAM 500 MG: 100 SOLUTION ORAL at 09:02

## 2018-02-11 RX ADMIN — OXYCODONE HYDROCHLORIDE 10 MG: 5 TABLET ORAL at 07:02

## 2018-02-11 RX ADMIN — ONDANSETRON HYDROCHLORIDE 4 MG: 2 INJECTION, SOLUTION INTRAMUSCULAR; INTRAVENOUS at 09:02

## 2018-02-11 RX ADMIN — OXYCODONE HYDROCHLORIDE 10 MG: 5 TABLET ORAL at 02:02

## 2018-02-11 RX ADMIN — URSODIOL 300 MG: 300 CAPSULE ORAL at 02:02

## 2018-02-11 RX ADMIN — METOCLOPRAMIDE HYDROCHLORIDE 10 MG: 5 SOLUTION ORAL at 12:02

## 2018-02-11 RX ADMIN — SODIUM CHLORIDE: 9 INJECTION, SOLUTION INTRAVENOUS at 12:02

## 2018-02-11 RX ADMIN — IPRATROPIUM BROMIDE AND ALBUTEROL SULFATE 3 ML: .5; 3 SOLUTION RESPIRATORY (INHALATION) at 12:02

## 2018-02-11 RX ADMIN — HEPARIN SODIUM 5000 UNITS: 5000 INJECTION, SOLUTION INTRAVENOUS; SUBCUTANEOUS at 06:02

## 2018-02-11 RX ADMIN — PROCHLORPERAZINE EDISYLATE 10 MG: 5 INJECTION INTRAMUSCULAR; INTRAVENOUS at 08:02

## 2018-02-11 RX ADMIN — ONDANSETRON HYDROCHLORIDE 4 MG: 2 INJECTION, SOLUTION INTRAMUSCULAR; INTRAVENOUS at 01:02

## 2018-02-11 RX ADMIN — OXYCODONE HYDROCHLORIDE 10 MG: 5 TABLET ORAL at 05:02

## 2018-02-11 RX ADMIN — HEPARIN SODIUM 5000 UNITS: 5000 INJECTION, SOLUTION INTRAVENOUS; SUBCUTANEOUS at 02:02

## 2018-02-11 RX ADMIN — METOCLOPRAMIDE HYDROCHLORIDE 10 MG: 5 SOLUTION ORAL at 06:02

## 2018-02-11 RX ADMIN — LEVOTHYROXINE SODIUM 75 MCG: 75 TABLET ORAL at 06:02

## 2018-02-11 RX ADMIN — ONDANSETRON HYDROCHLORIDE 4 MG: 2 INJECTION, SOLUTION INTRAMUSCULAR; INTRAVENOUS at 05:02

## 2018-02-11 RX ADMIN — CYCLOSPORINE 175 MG: 100 SOLUTION ORAL at 05:02

## 2018-02-11 RX ADMIN — ALBUMIN (HUMAN) 25 G: 12.5 SOLUTION INTRAVENOUS at 12:02

## 2018-02-11 RX ADMIN — ALBUMIN (HUMAN) 25 G: 12.5 SOLUTION INTRAVENOUS at 02:02

## 2018-02-11 RX ADMIN — METOCLOPRAMIDE HYDROCHLORIDE 10 MG: 5 SOLUTION ORAL at 04:02

## 2018-02-11 RX ADMIN — URSODIOL 300 MG: 300 CAPSULE ORAL at 06:02

## 2018-02-11 RX ADMIN — OXYCODONE HYDROCHLORIDE 10 MG: 5 TABLET ORAL at 11:02

## 2018-02-11 RX ADMIN — ATOVAQUONE 1500 MG: 750 SUSPENSION ORAL at 09:02

## 2018-02-11 RX ADMIN — CYCLOSPORINE 175 MG: 100 SOLUTION ORAL at 09:02

## 2018-02-11 RX ADMIN — URSODIOL 300 MG: 300 CAPSULE ORAL at 09:02

## 2018-02-11 RX ADMIN — OXYCODONE HYDROCHLORIDE 10 MG: 5 TABLET ORAL at 10:02

## 2018-02-11 NOTE — ASSESSMENT & PLAN NOTE
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+, steroid induction, on maintenance tacro/MMF/pred; c/b seizures, LAURA, superificial wound infection s/p wound vac to [facility] through 1/12, and several recent admissions on 11/24 and 12/3 with culture negative peritonitis with peak WBC ~5k due to suspected indolent biliary leakage s/p sphincterotomy/biliary stent placement over CBD stricture on 12/6 ERCP although notably with no overt leak noted during procedure) who was admitted on 1/11/2017 with acute onset fevers and acute on chronic N/V/abdominal pain (RLQ worst) in the setting of persistent noninflammatory ascites of unknown etiology. He underwent repeated drainage of pleural fluid and ascites that did not show evidence of gross infection and given that fevers were antibiotic responsive and walled off biloma was suspected, he underwent ex-lap on 2/8 where a walled off bilious collection was noted. He remains tenuous post-operatively after washout.    - would continue empiric coverage cultures so far negateive but still febrile - keep on IV today   May be able to change to augmentin on 2-11-18

## 2018-02-11 NOTE — PROGRESS NOTES
[facility]  Liver Transplant  Progress Note    Patient Name: Jhonny Diana  MRN: [id number]  Admission Date: 2018  Hospital Length of Stay: 31 days  Code Status: Full Code  Primary Care Provider: Primary Doctor No  Post-Operative Day: 115    ORGAN:   LIVER  Disease Etiology: Acute Alcoholic Hepatitis  Donor Type:    - Brain Death  CDC High Risk:   No  Donor CMV Status:   Donor CMV Status: Positive  Donor HBcAB:   Negative  Donor HCV Status:   Negative  Whole or Partial: Whole Liver  Biliary Anastomosis: End to End  Arterial Anatomy: Standard  Subjective:     History of Present Illness:  Jhonny Diana is a 28 y/o male with past medical history of alcoholic cirrhosis.  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 Found to have peritonitis in November (WBC 5000, 75% PNM) neg for bile leak. He was treated initially with vanc/cefepime. Repeat cell counts  with some improvement (WBC 1400, 45% PNM). He has undergone multiple paracenteses as well as abscess drainage of perihepatic fluid collections and treated with antimicrobial therapy but no positive cultures. Of note, biliary stricture also identified and ERCP performed on 2017 with sphincterotomy and biliary stent placed. Liver tests still have not normalized despite intervention, bilirubin and AP remain elevated. Other pertinent PMH current wound vac in place 2/2 wound infection, malnutrition requiring TPN for short course and ongoing hypoalbuminemia, and seizure activity while on prograf and has since been switched to cyclosporine without reoccurrence.  He presented to the ER for fever, abdominal pain, and N/V. He reports fever (103) for 1  day prior. Overnight, he developed N/V, reports small amount of green emesis with new left sided pain. He also endorses worsening SOB with exertion. He was scheduled as an outpatient for follow up paracentesis and IR drainage of fluid collection. CXR in ER shows large pleural effusion with subsegmental atelectasis. Infectious work up initiated in ER. His ANC is 900. Broad spectrum antibiotics initiated in ED. He denies chest pain, palpitations, diarrhea, constipation, or back pain. Denies any sick contacts.    Hospital Course:  Thoracentesis (1.2L off), Paracentesis (1.6L off), and IR drainage of fluid collection 1/11, all fluids negative for infection. ID consulted. Broad spectrum antibiotics d/c'd 1/15. Chronically malnourished with poor PO intake, prealbumin 7. Shane tube placed 1/15 for tube feedings.     ERCP 1/17 with sludge and a biliary stone which was removed and stent exchanged.  Remained with n/v; therefore, TF remained on hold and TPN continued.   Pt with fever s/p ERCP on 1/17 which continued until 1/19.  Vanc/cefepime restarted.  Blood cx 1/17 and 1/18 NGTD.  ID reconsulted.  Fungal markers sent.  CT C/A/P obtained.  With large R pleural effusion and ascites- both drained 1/19 and negative for infx per cell count. Pt afebrile 1/20. Liver biopsy 1/23 - without rejection.  EGD 1/25 - unremarkable for source of GI symptoms.  Resume diet along with other GI recommendations. SHANE tube placed for tube feeds.       Interval History:  Tmax overnight 100.3. Remains on Zosyn and Vanc. Blood cultures ordered. Taken or OR 2/7/18, multiple collections drained. Fluid sent for culture pending. ID following. Transitioned to nocturnal TFs to encourage eating during the day. Pt not tolerating TFs well, held a some time overnight due to severe nausea. Small bowel follow through ordered tomorrow. H&H 7/21.2. 1 unit PRBCs and albumin 25% x1 dose ordered today. Monitor.     Scheduled Meds:   sodium chloride 0.9%   Intravenous  Once    albumin human 25%  25 g Intravenous Once    albuterol-ipratropium 2.5mg-0.5mg/3mL  3 mL Nebulization Q4H WAKE    atovaquone  1,500 mg Oral Daily    cycloSPORINE  175 mg Oral BID    docusate sodium  100 mg Oral BID    epoetin maurisio (PROCRIT) injection  10,000 Units Intravenous Every Mon, Wed, Fri    heparin (porcine)  5,000 Units Subcutaneous Q8H    custom IVPB builder   Intravenous Q24H    levetiracetam oral soln  500 mg Oral BID    levothyroxine  75 mcg Oral Before breakfast    metoclopramide HCl  10 mg Oral QID (AC & HS)    omeprazole  40 mg Oral QAM    polyethylene glycol  17 g Oral BID    psyllium husk (aspartame)  3.4 g Oral BID    ursodiol  300 mg Oral TID    valganciclovir 50 mg/ml  200 mg Oral Every Mon, Wed, Fri     Continuous Infusions:  PRN Meds:sodium chloride, sodium chloride 0.9%, acetaminophen, bisacodyl, heparin (porcine), omnipaque, ondansetron, ondansetron, oxyCODONE, oxyCODONE, prochlorperazine, simethicone, sodium chloride 0.9%, sodium chloride 0.9%    Review of Systems   Constitutional: Positive for activity change, appetite change, fatigue and fever. Negative for chills.   HENT: Negative.  Negative for congestion and facial swelling.    Eyes: Negative for pain, discharge and visual disturbance.   Respiratory: Negative for cough, chest tightness, shortness of breath and wheezing.    Cardiovascular: Negative for chest pain, palpitations and leg swelling.   Gastrointestinal: Positive for abdominal distention, abdominal pain, constipation and nausea. Negative for vomiting.   Endocrine: Negative.    Genitourinary: Positive for decreased urine volume. Negative for difficulty urinating.        Anuric   Musculoskeletal: Negative for arthralgias, back pain and myalgias.   Skin: Positive for color change and wound. Negative for rash.   Allergic/Immunologic: Positive for immunocompromised state.   Neurological: Negative for dizziness, seizures, weakness and headaches.    Psychiatric/Behavioral: Positive for decreased concentration and dysphoric mood. Negative for confusion. The patient is not nervous/anxious.    All other systems reviewed and are negative.    Objective:     Vital Signs (Most Recent):  Temp: 99.6 °F (37.6 °C) (02/11/18 0907)  Pulse: 86 (02/11/18 0907)  Resp: 15 (02/11/18 0907)  BP: 120/74 (02/11/18 0907)  SpO2: (!) 94 % (02/11/18 0907) Vital Signs (24h Range):  Temp:  [98.7 °F (37.1 °C)-99.6 °F (37.6 °C)] 99.6 °F (37.6 °C)  Pulse:  [] 86  Resp:  [13-19] 15  SpO2:  [93 %-95 %] 94 %  BP: (111-132)/(58-75) 120/74     Weight: 70.2 kg (154 lb 12.2 oz)  Body mass index is 24.98 kg/m².    Intake/Output - Last 3 Shifts       02/09 0700 - 02/10 0659 02/10 0700 - 02/11 0659 02/11 0700 - 02/12 0659    P.O. 180 205     I.V. (mL/kg)  300 (4.3)     Other 600      NG/ 515     IV Piggyback 350 300     Total Intake(mL/kg) 1635 (23.5) 1320 (18.8)     Urine (mL/kg/hr) 100 (0.1) 0 (0)     Drains 55 (0) 80 (0)     Other 2697 (1.6)      Stool 0 (0) 0 (0)     Total Output 2852 80      Net -1217 +1240             Stool Occurrence 1 x 0 x 0 x          Physical Exam   Constitutional: He is oriented to person, place, and time. He appears well-developed. No distress.   Temporal and distal extremity muscle wasting   HENT:   Head: Normocephalic and atraumatic.   Mouth/Throat: No oropharyngeal exudate.   dylan tube   Eyes: EOM are normal. Pupils are equal, round, and reactive to light. Scleral icterus is present.   Neck: Normal range of motion. Neck supple. No JVD present. No thyromegaly present.   Cardiovascular: Normal rate, regular rhythm, normal heart sounds and intact distal pulses.    No murmur heard.  Pulmonary/Chest: Effort normal. No respiratory distress. He has decreased breath sounds in the right middle field, the right lower field and the left lower field. He has no wheezes. He exhibits no tenderness.   Diminished to RLL   Abdominal: Soft. Bowel sounds are normal. He  exhibits ascites. He exhibits no distension. There is tenderness (mild). There is no rebound and no guarding.   Dressing to chevron.  Wd vac removed 1/12/18  Dependent edema present R flank; surrounding erythema   Musculoskeletal: Normal range of motion. He exhibits edema (2+ LE edema). He exhibits no tenderness.   Neurological: He is alert and oriented to person, place, and time. He has normal reflexes.   Skin: Skin is warm and dry. Capillary refill takes 2 to 3 seconds. He is not diaphoretic. No erythema.   jaundice   Psychiatric: He has a normal mood and affect. His behavior is normal. Judgment and thought content normal. His mood appears not anxious.   Depressed mood   Nursing note and vitals reviewed.      Laboratory:  Immunosuppressants         Stop Route Frequency     cycloSPORINE (NEORAL) 100 mg/mL microemulsion solution 175 mg      -- Oral 2 times daily        CBC:   Recent Labs  Lab 02/11/18  0333   WBC 2.88*   RBC 2.24*   HGB 7.0*   HCT 21.2*      MCV 95   MCH 31.3*   MCHC 33.0     CMP:   Recent Labs  Lab 02/11/18  0333      CALCIUM 9.1   ALBUMIN 2.3*   PROT 5.5*      K 3.2*   CO2 26   CL 99   BUN 24*   CREATININE 4.6*   ALKPHOS 84   ALT <5*   AST 12   BILITOT 1.9*     Labs within the past 24 hours have been reviewed.    Diagnostic Results:  I have personally reviewed all pertinent imaging studies.    Assessment/Plan:     Delayed surgical wound healing    - wd vac removed 1/12/18.  Wound healing well. Aquacel AG and Mepilex border dressing applied via wound care recs.           Liver transplanted    - Post op course complicated by fevers and hyperbilirubinemia.  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  - AST/ALT normal. Tbili/alkphos remain elevated.  - Liver US 1/8 showed 3.9 cm complex fluid collection anterior to right lobe and moderate nonspecific complex ascites inferior to transplant.   - IR placed drain 1/11/18, cell count negative for infection.  - PBS  consulted. PBS not comfortable proceeding with EUS with liver biopsy given fever and possible infectious process going on. ERCP 1/17 with stone and sludge. Pt placed on Actigall - treated with 10 day course of abx.  ID now signed off.  - Bili elevated - liver u/s 1/28  - Liver biopsy 1/23 without rejection.  - consulted PBS for ERCP - Choledocholithiasis was found along with sludge, stents placed 1/30.  - T bili 1.9. Monitor.             Long-term use of immunosuppressant medication    - Maintenance IS with cyclosporine. MMF on hold for infections and neutropenia. Continue to check cyclosporine level daily. Assess for toxicity and adjust level as needed.        Prophylactic immunotherapy    - See long term use of immunosuppression.         At risk for opportunistic infections    - CMV detected 2/1 now on tx dose valcyte.  - bactrim held.          Anemia of chronic disease    - Continue daily CBC.  - Previously transfused 1/30, 2/6, continue procrit.  - Given 1 U PRBC 2/6 with response in H/H.  - H&H 7/21.2. Transfuse 1 unit PRBCs.         Other cytomegaloviral diseases    - detected at 370 on CMV PCR 2/1.  - case discussed with ID and will hold off on treatment at this time given low WBC.   - undetected CMV PCR 2/8.  - continue Valcyte 200 mg every Mon, Wed, Fri.        Fever    - Tmax 100.3 overnight.   - Started on vanc/cefepime for ERCP. Now transitioned to vanc/zosyn.  - Blood cultures sent, prelim NGTD. Repeat blood cultures and chest xray today 2/10.  - thoracentesis 2/1 - fluid negative for infection.  - atelectasis noted on ct scan - start breathing tx & CPT.  - now cmv positive - started treatment 2/5/18.  - taken to OR 2/7 for exp lap - fluid collections drained, cultures pending.        Constipation    - Enema ordered 1/20 and 1/21 with BM.  - Encourage ambulation.  - decrease narcotics.  - d/c metamucil and miralax bid as makes patient nauseated.  - increase reglan qid.            Other ascites    -  Paracentesis performed 1/11/18 with 1600 ml removed.    - fluid negative for infection.   - Repeat para 1/19 negative for infection per cell count.  - no fluid seen for paracentesis on 1/23.        Severe protein-calorie malnutrition    - Poor PO intake since transplant requiring short course of TPN during previous hospital stay.   - albumin remains decreased but appetite slowly improving per pt.   - Dietary consulted. Will need to closely monitor PO intake.   - supplements also ordered.   - SHANE with tube feeding start 1/16. Stopped 1/17/18, was not tolerating. [place] removed 1/20.  - prealbumin 7 on 1/15.  - TPN started 1/16/18.   - Pt able to eat minimally 1/21 and 1/22 which is an improvement.  - gi consulted - EGD reviewed, increased ppi bid, miralax bid, and metamucil bid, decreased narcotics.  - continue reglan, placed SHANE tube with tube feeds - transitioned to nightly feeds. Not tolerating well due to nausea.   - Albumin 25% x 1 dose today.           Nausea and vomiting    - h/o constipation, reports having regular BM's with bowel regimen.  - vomiting episode x 1 at home. Anti-emetics ordered PRN.  - KUB repeated 1/14/18 given increased abdominal pain- mild, generalized bowel distention suggesting ileus.  Diet changed to clears as tolerated for bowel rest .  - SHANE tube placed 1/16 for tube feeds. Not tolerating tube feeds, stopped 1/17/18.  - D/C shane 1/19, now in place as of 1/25.  - chronic constipation, Cont bowel regimen.   - GI consulted. See protein calorie malnutrition.  - not tolerating TFs due to nausea. Holding periodically overnight. Plan for SBFT tomorrow. NPO/hold TF at midnight.           Recurrent pleural effusion on right    - CXR in ER with large right pleural effusion with subjective c/o worsening SOB  - thoracentesis completed- 1200 ml removed.  SOB with significant improvement.   - Cell count reviewed and negative for infection.   - resp even and non labored.  O2 sat 94-98%.  - Repeat thora  1/19 negative for infection per cell count.  - Reaccumulating fluid on xray 1/21.  - thoracentesis 1/31 - 1.4L removed, negative for infection  - Reaccumulating fluid again on xray today 2/10. May need thoracentesis Monday 2/12.        Biliary stricture of transplanted liver    - tbili with increase prompting ERCP 1/30 with stones and sludge, stents placed.  - T bili now improving. Trend daily.         Acute renal failure with tubular necrosis    - HD resumed on previous admission. Now anuric and dialyzes M-W-F.  - Nephrology following.    - HD Sat since held Friday for thora/para.  - ktm consulted for possible kidney transplant.            VTE Risk Mitigation         Ordered     heparin (porcine) injection 1,000 Units  As needed (PRN)     Route:  Intra-Catheter        02/05/18 0945     heparin (porcine) injection 5,000 Units  Every 8 hours     Route:  Subcutaneous        01/31/18 0957     Medium Risk of VTE  Once      01/11/18 0351     Place sequential compression device  Until discontinued      01/11/18 0351          The patients clinical status was discussed at multidisplinary rounds, involving transplant surgery, transplant medicine, pharmacy, nursing, nutrition, and social work    Discharge Planning: not a candidate for dc at this time.  Monitor kidney function --> may need outpt HD  Monitor HH needs vs rehab for deconditioned status      Mandy Crespo NP  Liver Transplant  [facility]

## 2018-02-11 NOTE — SUBJECTIVE & OBJECTIVE
Interval History: feels and looks great but still with feverew of Systems   Constitutional: Negative for fever.   Cardiovascular: Positive for leg swelling.   Gastrointestinal: Positive for abdominal pain and nausea. Negative for diarrhea and vomiting.   Genitourinary:        Anuric     Allergic/Immunologic: Positive for immunocompromised state.     Objective:     Vital Signs (Most Recent):  Temp: 100 °F (37.8 °C) (02/09/18 1949)  Pulse: 104 (02/09/18 1949)  Resp: 18 (02/09/18 1949)  BP: 123/70 (02/09/18 1949)  SpO2: (!) 94 % (02/09/18 1949) Vital Signs (24h Range):  Temp:  [99.3 °F (37.4 °C)-100.5 °F (38.1 °C)] 100 °F (37.8 °C)  Pulse:  [] 104  Resp:  [12-20] 18  SpO2:  [90 %-95 %] 94 %  BP: (121-144)/(58-86) 123/70     Weight: 70.9 kg (156 lb 4.9 oz)  Body mass index is 25.23 kg/m².    Estimated Creatinine Clearance: 21.1 mL/min (based on SCr of 4.7 mg/dL (H)).    Physical Exam   Constitutional: He is oriented to person, place, and time. He appears well-developed. No distress.   Temporal and distal extremity muscle wasting   HENT:   Head: Normocephalic and atraumatic.   Mouth/Throat: No oropharyngeal exudate.   Eyes: EOM are normal. Pupils are equal, round, and reactive to light. No scleral icterus.   Neck: Normal range of motion. Neck supple. No JVD present. No thyromegaly present.   Cardiovascular: Normal rate, regular rhythm, normal heart sounds and intact distal pulses.    No murmur heard.  Pulmonary/Chest: Effort normal. No respiratory distress. He has decreased breath sounds in the right middle field, the right lower field and the left lower field. He has no wheezes. He exhibits no tenderness.   Diminished to RLL   Abdominal: Soft. Bowel sounds are normal. He exhibits distension and ascites. There is tenderness (mild). There is no rebound and no guarding.   Exlap incision dressed. Leakage of ascites noted.   Musculoskeletal: Normal range of motion. He exhibits edema (2+ LE edema). He exhibits no  tenderness.   Neurological: He is alert and oriented to person, place, and time. He has normal reflexes.   Skin: Skin is warm and dry. He is not diaphoretic. No erythema.   Psychiatric: He has a normal mood and affect. His behavior is normal. Judgment and thought content normal. His mood appears not anxious.   Nursing note and vitals reviewed.        Review of Systems  Objective:     Vital Signs (Most Recent):  Temp: 99.1 °F (37.3 °C) (02/11/18 0019)  Pulse: 102 (02/11/18 0019)  Resp: 19 (02/11/18 0019)  BP: 132/69 (02/11/18 0019)  SpO2: (!) 94 % (02/11/18 0019) Vital Signs (24h Range):  Temp:  [98.8 °F (37.1 °C)-100.3 °F (37.9 °C)] 99.1 °F (37.3 °C)  Pulse:  [] 102  Resp:  [13-19] 19  SpO2:  [93 %-96 %] 94 %  BP: (111-132)/(58-71) 132/69     Weight: 69.5 kg (153 lb 3.5 oz)  Body mass index is 24.73 kg/m².    Estimated Creatinine Clearance: 29.2 mL/min (based on SCr of 3.4 mg/dL (H)).    Physical Exam    Significant Labs: All pertinent labs within the past 24 hours have been reviewed.    Significant Imaging: I have reviewed all pertinent imaging results/findings within the past 24 hours.

## 2018-02-11 NOTE — ASSESSMENT & PLAN NOTE
- Poor PO intake since transplant requiring short course of TPN during previous hospital stay.   - albumin remains decreased but appetite slowly improving per pt.   - Dietary consulted. Will need to closely monitor PO intake.   - supplements also ordered.   - SHANE with tube feeding start 1/16. Stopped 1/17/18, was not tolerating. Shane removed 1/20.  - prealbumin 7 on 1/15.  - TPN started 1/16/18.   - Pt able to eat minimally 1/21 and 1/22 which is an improvement.  - gi consulted - EGD reviewed, increased ppi bid, miralax bid, and metamucil bid, decreased narcotics.  - continue reglan, placed SHANE tube with tube feeds - transitioned to nightly feeds. Not tolerating well due to nausea.   - Albumin 25% x 1 dose today.

## 2018-02-11 NOTE — ASSESSMENT & PLAN NOTE
- Tmax 100.3 overnight.   - Started on vanc/cefepime for ERCP. Now transitioned to vanc/zosyn.  - Blood cultures sent, prelim NGTD. Repeat blood cultures and chest xray today 2/10.  - thoracentesis 2/1 - fluid negative for infection.  - atelectasis noted on ct scan - start breathing tx & CPT.  - now cmv positive - started treatment 2/5/18.  - taken to OR 2/7 for exp lap - fluid collections drained, cultures pending.

## 2018-02-11 NOTE — ASSESSMENT & PLAN NOTE
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+, steroid induction, on maintenance tacro/MMF/pred; c/b seizures, LAURA, superificial wound infection s/p wound vac to [facility] through 1/12, and several recent admissions on 11/24 and 12/3 with culture negative peritonitis with peak WBC ~5k due to suspected indolent biliary leakage s/p sphincterotomy/biliary stent placement over CBD stricture on 12/6 ERCP although notably with no overt leak noted during procedure) who was admitted on 1/11/2017 with acute onset fevers and acute on chronic N/V/abdominal pain (RLQ worst) in the setting of persistent noninflammatory ascites of unknown etiology. He underwent repeated drainage of pleural fluid and ascites that did not show evidence of gross infection and given that fevers were antibiotic responsive and walled off biloma was suspected, he underwent ex-lap on 2/8 where a walled off bilious collection was noted. He is slowly improving post-operatively after washout.    - complete an additional week of augmentin and then stop antibiotics  - additional prophylaxis including isavuconazole per protocol

## 2018-02-11 NOTE — ASSESSMENT & PLAN NOTE
- Continue daily CBC.  - Previously transfused 1/30, 2/6, continue procrit.  - Given 1 U PRBC 2/6 with response in H/H.  - H&H 7/21.2. Transfuse 1 unit PRBCs.

## 2018-02-11 NOTE — PROGRESS NOTES
[facility]  Infectious Disease  Progress Note    Patient Name: Jhonny Diana  MRN: [id number]  Admission Date: 1/11/2018  Length of Stay: 31 days  Attending Physician: Nathanael Medina MD  Primary Care Provider: Primary Doctor No    Isolation Status: No active isolations  Assessment/Plan:      Fever    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, CMV D+/R+, steroid induction, on maintenance tacro/MMF/pred; c/b seizures, LAURA, superificial wound infection s/p wound vac to [facility] through 1/12, and several recent admissions on 11/24 and 12/3 with culture negative peritonitis with peak WBC ~5k due to suspected indolent biliary leakage s/p sphincterotomy/biliary stent placement over CBD stricture on 12/6 ERCP although notably with no overt leak noted during procedure) who was admitted on 1/11/2017 with acute onset fevers and acute on chronic N/V/abdominal pain (RLQ worst) in the setting of persistent noninflammatory ascites of unknown etiology. He underwent repeated drainage of pleural fluid and ascites that did not show evidence of gross infection and given that fevers were antibiotic responsive and walled off biloma was suspected, he underwent ex-lap on 2/8 where a walled off bilious collection was noted. He remains tenuous post-operatively after washout.    - would continue empiric coverage cultures so far negateive but still febrile - keep on IV today   May be able to change to augmentin on 2-11-18            Anticipated Disposition:     Thank you for your consult. I will follow-up with patient. Please contact us if you have any additional questions.    Devante Schofield MD  Infectious Disease  [facility]    Subjective:     Principal Problem:Fever    HPI: No notes on file  Interval History: feels and looks great but still with feverew of Systems   Constitutional: Negative for fever.   Cardiovascular: Positive for leg  swelling.   Gastrointestinal: Positive for abdominal pain and nausea. Negative for diarrhea and vomiting.   Genitourinary:        Anuric     Allergic/Immunologic: Positive for immunocompromised state.     Objective:     Vital Signs (Most Recent):  Temp: 100 °F (37.8 °C) (02/09/18 1949)  Pulse: 104 (02/09/18 1949)  Resp: 18 (02/09/18 1949)  BP: 123/70 (02/09/18 1949)  SpO2: (!) 94 % (02/09/18 1949) Vital Signs (24h Range):  Temp:  [99.3 °F (37.4 °C)-100.5 °F (38.1 °C)] 100 °F (37.8 °C)  Pulse:  [] 104  Resp:  [12-20] 18  SpO2:  [90 %-95 %] 94 %  BP: (121-144)/(58-86) 123/70     Weight: 70.9 kg (156 lb 4.9 oz)  Body mass index is 25.23 kg/m².    Estimated Creatinine Clearance: 21.1 mL/min (based on SCr of 4.7 mg/dL (H)).    Physical Exam   Constitutional: He is oriented to person, place, and time. He appears well-developed. No distress.   Temporal and distal extremity muscle wasting   HENT:   Head: Normocephalic and atraumatic.   Mouth/Throat: No oropharyngeal exudate.   Eyes: EOM are normal. Pupils are equal, round, and reactive to light. No scleral icterus.   Neck: Normal range of motion. Neck supple. No JVD present. No thyromegaly present.   Cardiovascular: Normal rate, regular rhythm, normal heart sounds and intact distal pulses.    No murmur heard.  Pulmonary/Chest: Effort normal. No respiratory distress. He has decreased breath sounds in the right middle field, the right lower field and the left lower field. He has no wheezes. He exhibits no tenderness.   Diminished to RLL   Abdominal: Soft. Bowel sounds are normal. He exhibits distension and ascites. There is tenderness (mild). There is no rebound and no guarding.   Exlap incision dressed. Leakage of ascites noted.   Musculoskeletal: Normal range of motion. He exhibits edema (2+ LE edema). He exhibits no tenderness.   Neurological: He is alert and oriented to person, place, and time. He has normal reflexes.   Skin: Skin is warm and dry. He is not  diaphoretic. No erythema.   Psychiatric: He has a normal mood and affect. His behavior is normal. Judgment and thought content normal. His mood appears not anxious.   Nursing note and vitals reviewed.        Review of Systems  Objective:     Vital Signs (Most Recent):  Temp: 99.1 °F (37.3 °C) (02/11/18 0019)  Pulse: 102 (02/11/18 0019)  Resp: 19 (02/11/18 0019)  BP: 132/69 (02/11/18 0019)  SpO2: (!) 94 % (02/11/18 0019) Vital Signs (24h Range):  Temp:  [98.8 °F (37.1 °C)-100.3 °F (37.9 °C)] 99.1 °F (37.3 °C)  Pulse:  [] 102  Resp:  [13-19] 19  SpO2:  [93 %-96 %] 94 %  BP: (111-132)/(58-71) 132/69     Weight: 69.5 kg (153 lb 3.5 oz)  Body mass index is 24.73 kg/m².    Estimated Creatinine Clearance: 29.2 mL/min (based on SCr of 3.4 mg/dL (H)).    Physical Exam    Significant Labs: All pertinent labs within the past 24 hours have been reviewed.    Significant Imaging: I have reviewed all pertinent imaging results/findings within the past 24 hours.

## 2018-02-11 NOTE — NURSING
Pt still having some difficulty tolerating nocturnal TF overnight. Novasource Renal ordered at 55 mL/hr nightly from 8p - 8a. This is pt's only source of nutrition at this time because he does not eat even though he is on a regular diet.    From 8p - 9p pt asked to have the tube feeds run at 30 mL/hr instead of the ordered rate; pt also requested IV Zofran during this time.  From 9p - 12:15a TF was run at the ordered rate of 55 mL/hr.  From 12:15 - 1a TF turned off, due to pt c/o nausea.  At 1a this RN checked for residual, none noted, and resumed TF at 55 mL/hr.    Addendum 0515: TF turned off again due to nausea. Pt has an emesis bag at his mouth. 4 mg IV Zofran given and stimuli minimized. Will reassess.    Addendum 0600: Pt asleep, checked for residual, none noted, and restarted TF at 55 mL/hr.

## 2018-02-11 NOTE — ASSESSMENT & PLAN NOTE
- h/o constipation, reports having regular BM's with bowel regimen.  - vomiting episode x 1 at home. Anti-emetics ordered PRN.  - KUB repeated 1/14/18 given increased abdominal pain- mild, generalized bowel distention suggesting ileus.  Diet changed to clears as tolerated for bowel rest .  - SHANE tube placed 1/16 for tube feeds. Not tolerating tube feeds, stopped 1/17/18.  - D/C shane 1/19, now in place as of 1/25.  - chronic constipation, Cont bowel regimen.   - GI consulted. See protein calorie malnutrition.  - not tolerating TFs due to nausea. Holding periodically overnight. Plan for SBFT tomorrow. NPO/hold TF at midnight.

## 2018-02-11 NOTE — PLAN OF CARE
Problem: Patient Care Overview  Goal: Plan of Care Review  Outcome: Ongoing (interventions implemented as appropriate)  Patient awake, alert, and oriented. Was able to eat food today, although told about NPO status for procedure tomorrow.

## 2018-02-11 NOTE — ASSESSMENT & PLAN NOTE
- Post op course complicated by fevers and hyperbilirubinemia.  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  - AST/ALT normal. Tbili/alkphos remain elevated.  - Liver US 1/8 showed 3.9 cm complex fluid collection anterior to right lobe and moderate nonspecific complex ascites inferior to transplant.   - IR placed drain 1/11/18, cell count negative for infection.  - PBS consulted. PBS not comfortable proceeding with EUS with liver biopsy given fever and possible infectious process going on. ERCP 1/17 with stone and sludge. Pt placed on Actigall - treated with 10 day course of abx.  ID now signed off.  - Bili elevated - liver u/s 1/28  - Liver biopsy 1/23 without rejection.  - consulted PBS for ERCP - Choledocholithiasis was found along with sludge, stents placed 1/30.  - T bili 1.9. Monitor.

## 2018-02-11 NOTE — PLAN OF CARE
"Problem: Patient Care Overview  Goal: Plan of Care Review  Outcome: Ongoing (interventions implemented as appropriate)  - Pt admitted 1/11/18 for nausea/vomiting and fever; pt is a liver transplant recipient from 10/19/17 for alcoholic hepatitis.  - Pt continues to have intermittent nausea and frequently asks to have his tube feedings paused or run at a reduced rate. Pt's nocturnal TF are his only source of nutrition at this time; pt has no significant PO intake during the day.  - Pt has abdominal pain rated "7" or "8" out of 10 - oxycodone 10 mg given PRN.  - IV abx: Cresemba q24h; vancomycin was given yesterday (one-time dose); pt's Zosyn was d/c'ed yesterday.  - Max oral temp 99.2 overnight.  - Per notes, pt may have a thoracentesis on Monday, and pt may require a PEG tube in the future. Pt has mother at bedside.      "

## 2018-02-11 NOTE — SUBJECTIVE & OBJECTIVE
Scheduled Meds:   sodium chloride 0.9%   Intravenous Once    albumin human 25%  25 g Intravenous Once    albuterol-ipratropium 2.5mg-0.5mg/3mL  3 mL Nebulization Q4H WAKE    atovaquone  1,500 mg Oral Daily    cycloSPORINE  175 mg Oral BID    docusate sodium  100 mg Oral BID    epoetin maurisio (PROCRIT) injection  10,000 Units Intravenous Every Mon, Wed, Fri    heparin (porcine)  5,000 Units Subcutaneous Q8H    custom IVPB builder   Intravenous Q24H    levetiracetam oral soln  500 mg Oral BID    levothyroxine  75 mcg Oral Before breakfast    metoclopramide HCl  10 mg Oral QID (AC & HS)    omeprazole  40 mg Oral QAM    polyethylene glycol  17 g Oral BID    psyllium husk (aspartame)  3.4 g Oral BID    ursodiol  300 mg Oral TID    valganciclovir 50 mg/ml  200 mg Oral Every Mon, Wed, Fri     Continuous Infusions:  PRN Meds:sodium chloride, sodium chloride 0.9%, acetaminophen, bisacodyl, heparin (porcine), omnipaque, ondansetron, ondansetron, oxyCODONE, oxyCODONE, prochlorperazine, simethicone, sodium chloride 0.9%, sodium chloride 0.9%    Review of Systems   Constitutional: Positive for activity change, appetite change, fatigue and fever. Negative for chills.   HENT: Negative.  Negative for congestion and facial swelling.    Eyes: Negative for pain, discharge and visual disturbance.   Respiratory: Negative for cough, chest tightness, shortness of breath and wheezing.    Cardiovascular: Negative for chest pain, palpitations and leg swelling.   Gastrointestinal: Positive for abdominal distention, abdominal pain, constipation and nausea. Negative for vomiting.   Endocrine: Negative.    Genitourinary: Positive for decreased urine volume. Negative for difficulty urinating.        Anuric   Musculoskeletal: Negative for arthralgias, back pain and myalgias.   Skin: Positive for color change and wound. Negative for rash.   Allergic/Immunologic: Positive for immunocompromised state.   Neurological: Negative for  dizziness, seizures, weakness and headaches.   Psychiatric/Behavioral: Positive for decreased concentration and dysphoric mood. Negative for confusion. The patient is not nervous/anxious.    All other systems reviewed and are negative.    Objective:     Vital Signs (Most Recent):  Temp: 99.6 °F (37.6 °C) (02/11/18 0907)  Pulse: 86 (02/11/18 0907)  Resp: 15 (02/11/18 0907)  BP: 120/74 (02/11/18 0907)  SpO2: (!) 94 % (02/11/18 0907) Vital Signs (24h Range):  Temp:  [98.7 °F (37.1 °C)-99.6 °F (37.6 °C)] 99.6 °F (37.6 °C)  Pulse:  [] 86  Resp:  [13-19] 15  SpO2:  [93 %-95 %] 94 %  BP: (111-132)/(58-75) 120/74     Weight: 70.2 kg (154 lb 12.2 oz)  Body mass index is 24.98 kg/m².    Intake/Output - Last 3 Shifts       02/09 0700 - 02/10 0659 02/10 0700 - 02/11 0659 02/11 0700 - 02/12 0659    P.O. 180 205     I.V. (mL/kg)  300 (4.3)     Other 600      NG/ 515     IV Piggyback 350 300     Total Intake(mL/kg) 1635 (23.5) 1320 (18.8)     Urine (mL/kg/hr) 100 (0.1) 0 (0)     Drains 55 (0) 80 (0)     Other 2697 (1.6)      Stool 0 (0) 0 (0)     Total Output 2852 80      Net -1217 +1240             Stool Occurrence 1 x 0 x 0 x          Physical Exam   Constitutional: He is oriented to person, place, and time. He appears well-developed. No distress.   Temporal and distal extremity muscle wasting   HENT:   Head: Normocephalic and atraumatic.   Mouth/Throat: No oropharyngeal exudate.   dylan tube   Eyes: EOM are normal. Pupils are equal, round, and reactive to light. Scleral icterus is present.   Neck: Normal range of motion. Neck supple. No JVD present. No thyromegaly present.   Cardiovascular: Normal rate, regular rhythm, normal heart sounds and intact distal pulses.    No murmur heard.  Pulmonary/Chest: Effort normal. No respiratory distress. He has decreased breath sounds in the right middle field, the right lower field and the left lower field. He has no wheezes. He exhibits no tenderness.   Diminished to RLL    Abdominal: Soft. Bowel sounds are normal. He exhibits ascites. He exhibits no distension. There is tenderness (mild). There is no rebound and no guarding.   Dressing to chevron.  Wd vac removed 1/12/18  Dependent edema present R flank; surrounding erythema   Musculoskeletal: Normal range of motion. He exhibits edema (2+ LE edema). He exhibits no tenderness.   Neurological: He is alert and oriented to person, place, and time. He has normal reflexes.   Skin: Skin is warm and dry. Capillary refill takes 2 to 3 seconds. He is not diaphoretic. No erythema.   jaundice   Psychiatric: He has a normal mood and affect. His behavior is normal. Judgment and thought content normal. His mood appears not anxious.   Depressed mood   Nursing note and vitals reviewed.      Laboratory:  Immunosuppressants         Stop Route Frequency     cycloSPORINE (NEORAL) 100 mg/mL microemulsion solution 175 mg      -- Oral 2 times daily        CBC:   Recent Labs  Lab 02/11/18  0333   WBC 2.88*   RBC 2.24*   HGB 7.0*   HCT 21.2*      MCV 95   MCH 31.3*   MCHC 33.0     CMP:   Recent Labs  Lab 02/11/18  0333      CALCIUM 9.1   ALBUMIN 2.3*   PROT 5.5*      K 3.2*   CO2 26   CL 99   BUN 24*   CREATININE 4.6*   ALKPHOS 84   ALT <5*   AST 12   BILITOT 1.9*     Labs within the past 24 hours have been reviewed.    Diagnostic Results:  I have personally reviewed all pertinent imaging studies.

## 2018-02-11 NOTE — SUBJECTIVE & OBJECTIVE
Interval History: Still notes nausea but able to eat yesterday now and walk some. Afebrile (incorrect documentation in team notes). No positive cultures from washout.    Review of Systems   Constitutional: Negative for fever.   Cardiovascular: Positive for leg swelling.   Gastrointestinal: Positive for abdominal pain and nausea. Negative for diarrhea and vomiting.   Genitourinary:        Anuric     Allergic/Immunologic: Positive for immunocompromised state.     Objective:     Vital Signs (Most Recent):  Temp: 99.3 °F (37.4 °C) (02/12/18 1226)  Pulse: 84 (02/12/18 1226)  Resp: 16 (02/12/18 1226)  BP: (!) 145/81 (02/12/18 1226)  SpO2: 95 % (02/12/18 0727) Vital Signs (24h Range):  Temp:  [97.9 °F (36.6 °C)-99.8 °F (37.7 °C)] 99.3 °F (37.4 °C)  Pulse:  [78-91] 84  Resp:  [16-20] 16  SpO2:  [94 %-96 %] 95 %  BP: (120-145)/(64-88) 145/81     Weight: 70.1 kg (154 lb 8.7 oz)  Body mass index is 24.94 kg/m².    Estimated Creatinine Clearance: 18 mL/min (based on SCr of 5.5 mg/dL (H)).    Physical Exam   Constitutional: He is oriented to person, place, and time. He appears well-developed. No distress.   Temporal and distal extremity muscle wasting   HENT:   Head: Normocephalic and atraumatic.   Mouth/Throat: No oropharyngeal exudate.   Eyes: EOM are normal. Pupils are equal, round, and reactive to light. No scleral icterus.   Neck: Normal range of motion. Neck supple. No JVD present. No thyromegaly present.   Cardiovascular: Normal rate, regular rhythm, normal heart sounds and intact distal pulses.    No murmur heard.  Pulmonary/Chest: Effort normal. No respiratory distress. He has decreased breath sounds in the right middle field, the right lower field and the left lower field. He has no wheezes. He exhibits no tenderness.   Diminished to RLL   Abdominal: Soft. Bowel sounds are normal. He exhibits distension and ascites. There is tenderness (mild). There is no rebound and no guarding.   Exlap incision dressed. Leakage of  ascites noted.   Musculoskeletal: Normal range of motion. He exhibits edema (2+ LE edema). He exhibits no tenderness.   Neurological: He is alert and oriented to person, place, and time. He has normal reflexes.   Skin: Skin is warm and dry. He is not diaphoretic. No erythema.   Psychiatric: He has a normal mood and affect. His behavior is normal. Judgment and thought content normal. His mood appears not anxious.   Nursing note and vitals reviewed.      Significant Labs:   CBC:     Recent Labs  Lab 02/11/18 0333 02/12/18  0451   WBC 2.88* 3.53*   HGB 7.0* 7.9*   HCT 21.2* 24.1*    209     CMP:     Recent Labs  Lab 02/11/18 0333 02/12/18 0451    135*   K 3.2* 3.7   CL 99 99   CO2 26 24    68*   BUN 24* 30*   CREATININE 4.6* 5.5*   CALCIUM 9.1 9.3   PROT 5.5* 5.3*   ALBUMIN 2.3* 2.2*   BILITOT 1.9* 2.1*   ALKPHOS 84 93   AST 12 16   ALT <5* <5*   ANIONGAP 11 12   EGFRNONAA 16.1* 13.0*       Significant Imaging: I have reviewed all pertinent imaging results/findings within the past 24 hours.     CT CAP:   Multifocal patchy areas of consolidation in the left lung with a more confluent consolidation or atelectasis in the right lung.   Large right and small left pleural effusions which create local compressive atelectasis.  Simple collection about the inferior margin of the liver similar to the prior ultrasound.  Other incidental findings including biliary stent placement, pneumobilia, cardiomegaly, right and left sided central venous catheters, and body wall anasarca.    Microbiology:  1/11 blood cx: negative  1/11 pleural fluid cx: negative  1/11 ascites cx: negative  1/17 blood cx: negative  1/18 blood cx: negative  1/19 pleural fluid cx: NGTD  1/19 ascites cx: NGTD  2/8 OR cx: NGTD    Review of Systems   Unable to perform ROS: Other     Physical Exam   Vitals reviewed.

## 2018-02-12 LAB
ALBUMIN SERPL BCP-MCNC: 2.2 G/DL
ALP SERPL-CCNC: 93 U/L
ALT SERPL W/O P-5'-P-CCNC: <5 U/L
ANION GAP SERPL CALC-SCNC: 12 MMOL/L
AST SERPL-CCNC: 16 U/L
BASOPHILS # BLD AUTO: 0.04 K/UL
BASOPHILS NFR BLD: 1.1 %
BILIRUB SERPL-MCNC: 2.1 MG/DL
BUN SERPL-MCNC: 30 MG/DL
CALCIUM SERPL-MCNC: 9.3 MG/DL
CHLORIDE SERPL-SCNC: 99 MMOL/L
CO2 SERPL-SCNC: 24 MMOL/L
CREAT SERPL-MCNC: 5.5 MG/DL
CYCLOSPORINE BLD LC/MS/MS-MCNC: 213 NG/ML
DIFFERENTIAL METHOD: ABNORMAL
EOSINOPHIL # BLD AUTO: 0.4 K/UL
EOSINOPHIL NFR BLD: 11.6 %
ERYTHROCYTE [DISTWIDTH] IN BLOOD BY AUTOMATED COUNT: 14.4 %
EST. GFR  (AFRICAN AMERICAN): 15 ML/MIN/1.73 M^2
EST. GFR  (NON AFRICAN AMERICAN): 13 ML/MIN/1.73 M^2
GLUCOSE SERPL-MCNC: 68 MG/DL
HCT VFR BLD AUTO: 24.1 %
HGB BLD-MCNC: 7.9 G/DL
IMM GRANULOCYTES # BLD AUTO: 0.05 K/UL
IMM GRANULOCYTES NFR BLD AUTO: 1.4 %
LYMPHOCYTES # BLD AUTO: 0.9 K/UL
LYMPHOCYTES NFR BLD: 26.3 %
MAGNESIUM SERPL-MCNC: 1.8 MG/DL
MCH RBC QN AUTO: 30.6 PG
MCHC RBC AUTO-ENTMCNC: 32.8 G/DL
MCV RBC AUTO: 93 FL
MONOCYTES # BLD AUTO: 0.4 K/UL
MONOCYTES NFR BLD: 10.2 %
NEUTROPHILS # BLD AUTO: 1.7 K/UL
NEUTROPHILS NFR BLD: 49.4 %
NRBC BLD-RTO: 0 /100 WBC
PHOSPHATE SERPL-MCNC: 4 MG/DL
PLATELET # BLD AUTO: 209 K/UL
PMV BLD AUTO: 10 FL
POTASSIUM SERPL-SCNC: 3.7 MMOL/L
PREALB SERPL-MCNC: 10 MG/DL
PROT SERPL-MCNC: 5.3 G/DL
RBC # BLD AUTO: 2.58 M/UL
SODIUM SERPL-SCNC: 135 MMOL/L
WBC # BLD AUTO: 3.53 K/UL

## 2018-02-12 PROCEDURE — 90935 HEMODIALYSIS ONE EVALUATION: CPT | Mod: ,,, | Performed by: INTERNAL MEDICINE

## 2018-02-12 PROCEDURE — 63600175 PHARM REV CODE 636 W HCPCS: Performed by: PHYSICIAN ASSISTANT

## 2018-02-12 PROCEDURE — 20600001 HC STEP DOWN PRIVATE ROOM

## 2018-02-12 PROCEDURE — 85025 COMPLETE CBC W/AUTO DIFF WBC: CPT

## 2018-02-12 PROCEDURE — 63600175 PHARM REV CODE 636 W HCPCS

## 2018-02-12 PROCEDURE — 84134 ASSAY OF PREALBUMIN: CPT

## 2018-02-12 PROCEDURE — 25500020 PHARM REV CODE 255: Performed by: SURGERY

## 2018-02-12 PROCEDURE — 99900035 HC TECH TIME PER 15 MIN (STAT)

## 2018-02-12 PROCEDURE — 36415 COLL VENOUS BLD VENIPUNCTURE: CPT

## 2018-02-12 PROCEDURE — 94664 DEMO&/EVAL PT USE INHALER: CPT

## 2018-02-12 PROCEDURE — 96372 THER/PROPH/DIAG INJ SC/IM: CPT

## 2018-02-12 PROCEDURE — 83735 ASSAY OF MAGNESIUM: CPT

## 2018-02-12 PROCEDURE — 80053 COMPREHEN METABOLIC PANEL: CPT

## 2018-02-12 PROCEDURE — 84100 ASSAY OF PHOSPHORUS: CPT

## 2018-02-12 PROCEDURE — 63600175 PHARM REV CODE 636 W HCPCS: Mod: JG | Performed by: SURGERY

## 2018-02-12 PROCEDURE — 25000003 PHARM REV CODE 250: Performed by: NURSE PRACTITIONER

## 2018-02-12 PROCEDURE — 99233 SBSQ HOSP IP/OBS HIGH 50: CPT | Mod: 24,,, | Performed by: NURSE PRACTITIONER

## 2018-02-12 PROCEDURE — 80158 DRUG ASSAY CYCLOSPORINE: CPT

## 2018-02-12 PROCEDURE — 25000003 PHARM REV CODE 250: Performed by: SURGERY

## 2018-02-12 PROCEDURE — 63600175 PHARM REV CODE 636 W HCPCS: Mod: JG | Performed by: NURSE PRACTITIONER

## 2018-02-12 PROCEDURE — 90935 HEMODIALYSIS ONE EVALUATION: CPT

## 2018-02-12 PROCEDURE — 99233 SBSQ HOSP IP/OBS HIGH 50: CPT | Mod: ,,, | Performed by: INTERNAL MEDICINE

## 2018-02-12 PROCEDURE — 25000003 PHARM REV CODE 250: Performed by: INTERNAL MEDICINE

## 2018-02-12 RX ORDER — FENTANYL CITRATE 50 UG/ML
25 INJECTION, SOLUTION INTRAMUSCULAR; INTRAVENOUS ONCE
Status: COMPLETED | OUTPATIENT
Start: 2018-02-12 | End: 2018-02-12

## 2018-02-12 RX ORDER — FENTANYL CITRATE 50 UG/ML
INJECTION, SOLUTION INTRAMUSCULAR; INTRAVENOUS
Status: COMPLETED
Start: 2018-02-12 | End: 2018-02-12

## 2018-02-12 RX ORDER — IPRATROPIUM BROMIDE AND ALBUTEROL SULFATE 2.5; .5 MG/3ML; MG/3ML
3 SOLUTION RESPIRATORY (INHALATION) EVERY 4 HOURS PRN
Status: DISCONTINUED | OUTPATIENT
Start: 2018-02-12 | End: 2018-04-10 | Stop reason: HOSPADM

## 2018-02-12 RX ORDER — OXYCODONE HYDROCHLORIDE 5 MG/1
5 TABLET ORAL EVERY 4 HOURS PRN
Status: DISCONTINUED | OUTPATIENT
Start: 2018-02-12 | End: 2018-02-14

## 2018-02-12 RX ADMIN — HEPARIN SODIUM 5000 UNITS: 5000 INJECTION, SOLUTION INTRAVENOUS; SUBCUTANEOUS at 06:02

## 2018-02-12 RX ADMIN — SODIUM CHLORIDE: 9 INJECTION, SOLUTION INTRAVENOUS at 01:02

## 2018-02-12 RX ADMIN — MORPHINE 200 MG: 10 SOLUTION ORAL at 05:02

## 2018-02-12 RX ADMIN — ERYTHROPOIETIN 10000 UNITS: 10000 INJECTION, SOLUTION INTRAVENOUS; SUBCUTANEOUS at 12:02

## 2018-02-12 RX ADMIN — URSODIOL 300 MG: 300 CAPSULE ORAL at 05:02

## 2018-02-12 RX ADMIN — OXYCODONE HYDROCHLORIDE 10 MG: 5 TABLET ORAL at 10:02

## 2018-02-12 RX ADMIN — LEVOTHYROXINE SODIUM 75 MCG: 75 TABLET ORAL at 05:02

## 2018-02-12 RX ADMIN — URSODIOL 300 MG: 300 CAPSULE ORAL at 09:02

## 2018-02-12 RX ADMIN — ATOVAQUONE 1500 MG: 750 SUSPENSION ORAL at 05:02

## 2018-02-12 RX ADMIN — FENTANYL CITRATE 25 MCG: 50 INJECTION, SOLUTION INTRAMUSCULAR; INTRAVENOUS at 03:02

## 2018-02-12 RX ADMIN — HEPARIN SODIUM 5000 UNITS: 5000 INJECTION, SOLUTION INTRAVENOUS; SUBCUTANEOUS at 09:02

## 2018-02-12 RX ADMIN — OXYCODONE HYDROCHLORIDE 5 MG: 5 TABLET ORAL at 05:02

## 2018-02-12 RX ADMIN — LEVETIRACETAM 500 MG: 100 SOLUTION ORAL at 05:02

## 2018-02-12 RX ADMIN — CYCLOSPORINE 175 MG: 100 SOLUTION ORAL at 05:02

## 2018-02-12 RX ADMIN — OXYCODONE HYDROCHLORIDE 5 MG: 5 TABLET ORAL at 09:02

## 2018-02-12 RX ADMIN — METOCLOPRAMIDE HYDROCHLORIDE 10 MG: 5 SOLUTION ORAL at 05:02

## 2018-02-12 RX ADMIN — OMEPRAZOLE 40 MG: 40 CAPSULE, DELAYED RELEASE ORAL at 05:02

## 2018-02-12 RX ADMIN — HEPARIN SODIUM 1000 UNITS: 1000 INJECTION, SOLUTION INTRAVENOUS; SUBCUTANEOUS at 12:02

## 2018-02-12 RX ADMIN — SODIUM CHLORIDE 1000 ML: 0.9 INJECTION, SOLUTION INTRAVENOUS at 08:02

## 2018-02-12 RX ADMIN — OXYCODONE HYDROCHLORIDE 10 MG: 5 TABLET ORAL at 05:02

## 2018-02-12 RX ADMIN — METOCLOPRAMIDE HYDROCHLORIDE 10 MG: 5 SOLUTION ORAL at 09:02

## 2018-02-12 RX ADMIN — IOHEXOL 200 ML: 350 INJECTION, SOLUTION INTRAVENOUS at 01:02

## 2018-02-12 NOTE — ASSESSMENT & PLAN NOTE
- Started on vanc/cefepime for ERCP. Now transitioned to vanc/zosyn.  - Blood cultures sent, prelim NGTD. Repeat blood cultures and chest xray today 2/10.  - thoracentesis 2/1 - fluid negative for infection.  - atelectasis noted on ct scan - start breathing tx & CPT.  - now cmv positive - started treatment 2/5/18.  - taken to OR 2/7 for exp lap - fluid collections drained, cultures pending.  - has been afebrile >48hr, continue augmentin per ID recs, ID signing off

## 2018-02-12 NOTE — PROGRESS NOTES
[facility]  Tolerated well.

## 2018-02-12 NOTE — PLAN OF CARE
"Problem: Patient Care Overview  Goal: Plan of Care Review  Outcome: Ongoing (interventions implemented as appropriate)  - Pt admitted 1/11/18 for nausea/vomiting and fever; pt is a liver transplant recipient from 10/19/17 for alcoholic hepatitis.  - Max oral temp 99.0 overnight. Pt transitioning to PO Augmentin from IV antibiotics today. Pt is also on IV Cresemba q24h.  - Pt has abdominal pain rated "7" or "8" out of 10 - oxycodone 10 mg given 3 times overnight.  - Pt continues to have intermittent nausea - IV Zofran given. Pt asked to have nocturnal tube feeds run at a reduced rate.  - Pt's prealbumin was checked today= 10, which is the lowest in a month. Pt did eat part of a hamburger yesterday, which is an improvement for him.  - Today, pt will go for dialysis (non-dialyzable morning meds given early) and then for a small bowel follow-through. Pt maintained on NPO status after midnight; tube feedings were held after midnight.  - Pt has a IRISH drain on his right side with serosanguineous output - 30 mL overnight. Drain insertion site is leaking - gauze and Abd pad applied.  - Pt has mother at bedside. Will continue to monitor.      "

## 2018-02-12 NOTE — PROGRESS NOTES
Update:  EZEKIEL met with pt's mother Sanjuanita Diana ([phone]) as pt down at HD and having a small bowel follow through procedure to improve motility. Pt's mother AAOx4 and reports that she is coping okay but frustrated as she is trying to assist pt with getting LTD through [employer]. Per mom, she has to start from square one even though they did all of the same things to get pt his STD through [employer]. EZEKIEL presented mom with [id number] for pt to sign when he returns from his procedure. EZEKIEL notified mom that she will  and bring to Liver EZEKIEL Malagon LMSW, who will be assisting with this paperwork on pt's behalf. Mom reports that pt is struggling due to continued abdominal pain. Pt's mother reports that pt aware he needs a kidney transplant and is motivated to have it done so that his medical issues improve. Pt's mother states pt's heel pain has improved and his fevers were lower over the weekend than they typically are. Pt's mother had questions related to Medicare vs. Private Insurance and is aware that pt can opt in or out for Medicare once 2728 turned in. EZEKIEL recommended that pt's mother relay back to the Medicare/Kidney transplant/dialysis guide that was given to him and mother last week by Liver EZEKIEL Malagon. EZEKIEL will also inquire with Lydia Malagon on Wed Feb 2/14 as this SW unable to provide information on the pros and cons. EZEKIEL also provided pt's mother with contact information for Transplant  Ivone Stephenson and the phone # to [employer] [facility] as this can be a resource for patients and families who have transplant related questions. Pt's mother expressed appreciation. EZEKIEL provided space for mother to ask questions/voice concerns. Pt's mother denied any further needs at this time. SW remains available for continued psychosocial support, education, resources, and additional d/c planning as needed.

## 2018-02-12 NOTE — ASSESSMENT & PLAN NOTE
- Enema ordered 1/20 and 1/21 with BM.  - Encourage ambulation.  - decrease narcotics.  - d/c metamucil and miralax bid as makes patient nauseated.  - increase reglan qid.  - small bowel follow through today, results pending

## 2018-02-12 NOTE — ASSESSMENT & PLAN NOTE
- Poor PO intake since transplant requiring short course of TPN during previous hospital stay.   - albumin remains decreased but appetite slowly improving per pt.   - Dietary consulted. Will need to closely monitor PO intake.   - supplements also ordered.   - SHANE with tube feeding start 1/16. Stopped 1/17/18, was not tolerating. Shane removed 1/20.  - prealbumin 7 on 1/15.  - TPN started 1/16/18.   - Pt able to eat minimally 1/21 and 1/22 which is an improvement.  - gi consulted - EGD reviewed, increased ppi bid, miralax bid, and metamucil bid, decreased narcotics.  - continue reglan, placed SHANE tube with tube feeds - transitioned to nightly feeds. Not tolerating well due to nausea.   - small bowel follow through today - results pending

## 2018-02-12 NOTE — ASSESSMENT & PLAN NOTE
- Post op course complicated by fevers and hyperbilirubinemia.  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  - AST/ALT normal. Tbili/alkphos remain elevated.  - Liver US 1/8 showed 3.9 cm complex fluid collection anterior to right lobe and moderate nonspecific complex ascites inferior to transplant.   - IR placed drain 1/11/18, cell count negative for infection.  - PBS consulted. PBS not comfortable proceeding with EUS with liver biopsy given fever and possible infectious process going on. ERCP 1/17 with stone and sludge. Pt placed on Actigall - treated with 10 day course of abx.  ID now signed off.  - Bili elevated - liver u/s 1/28  - Liver biopsy 1/23 without rejection.  - consulted PBS for ERCP - Choledocholithiasis was found along with sludge, stents placed 1/30.  - T bili 2.1 Monitor.

## 2018-02-12 NOTE — PROGRESS NOTES
Transported from dialysis unit to radiology via wheelchair by transporter with personal blanket and belongings bag.

## 2018-02-12 NOTE — PROGRESS NOTES
[facility]  Liver Transplant  Progress Note    Patient Name: Jhonny Diana  MRN: [id number]  Admission Date: 2018  Hospital Length of Stay: 32 days  Code Status: Full Code  Primary Care Provider: Primary Doctor No  Post-Operative Day: 116    ORGAN:   LIVER  Disease Etiology: Acute Alcoholic Hepatitis  Donor Type:    - Brain Death  CDC High Risk:   No  Donor CMV Status:   Donor CMV Status: Positive  Donor HBcAB:   Negative  Donor HCV Status:   Negative  Whole or Partial: Whole Liver  Biliary Anastomosis: End to End  Arterial Anatomy: Standard  Subjective:     History of Present Illness:  Jhonny Diana is a 28 y/o male with past medical history of alcoholic cirrhosis.  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 Found to have peritonitis in November (WBC 5000, 75% PNM) neg for bile leak. He was treated initially with vanc/cefepime. Repeat cell counts  with some improvement (WBC 1400, 45% PNM). He has undergone multiple paracenteses as well as abscess drainage of perihepatic fluid collections and treated with antimicrobial therapy but no positive cultures. Of note, biliary stricture also identified and ERCP performed on 2017 with sphincterotomy and biliary stent placed. Liver tests still have not normalized despite intervention, bilirubin and AP remain elevated. Other pertinent PMH current wound vac in place 2/2 wound infection, malnutrition requiring TPN for short course and ongoing hypoalbuminemia, and seizure activity while on prograf and has since been switched to cyclosporine without reoccurrence.  He presented to the ER for fever, abdominal pain, and N/V. He reports fever (103) for 1  day prior. Overnight, he developed N/V, reports small amount of green emesis with new left sided pain. He also endorses worsening SOB with exertion. He was scheduled as an outpatient for follow up paracentesis and IR drainage of fluid collection. CXR in ER shows large pleural effusion with subsegmental atelectasis. Infectious work up initiated in ER. His ANC is 900. Broad spectrum antibiotics initiated in ED. He denies chest pain, palpitations, diarrhea, constipation, or back pain. Denies any sick contacts.    Hospital Course:  Thoracentesis (1.2L off), Paracentesis (1.6L off), and IR drainage of fluid collection 1/11, all fluids negative for infection. ID consulted. Broad spectrum antibiotics d/c'd 1/15. Chronically malnourished with poor PO intake, prealbumin 7. Shane tube placed 1/15 for tube feedings.     ERCP 1/17 with sludge and a biliary stone which was removed and stent exchanged.  Remained with n/v; therefore, TF remained on hold and TPN continued.   Pt with fever s/p ERCP on 1/17 which continued until 1/19.  Vanc/cefepime restarted.  Blood cx 1/17 and 1/18 NGTD.  ID reconsulted.  Fungal markers sent.  CT C/A/P obtained.  With large R pleural effusion and ascites- both drained 1/19 and negative for infx per cell count. Pt afebrile 1/20. Liver biopsy 1/23 - without rejection.  EGD 1/25 - unremarkable for source of GI symptoms.  Resume diet along with other GI recommendations. SHANE tube placed for tube feeds.       Interval History:  Unable to assess due to patient off floor for testing.  Reviewed labs and discussed clinical plan with staff, Dr. Herrera.    No new subjective & objective note has been filed under this hospital service since the last note was generated.    Assessment/Plan:     Fever    - Started on vanc/cefepime for ERCP. Now transitioned to vanc/zosyn.  - Blood cultures sent, prelim NGTD. Repeat blood cultures and chest xray today 2/10.  - thoracentesis 2/1 - fluid negative for infection.  -  atelectasis noted on ct scan - start breathing tx & CPT.  - now cmv positive - started treatment 2/5/18.  - taken to OR 2/7 for exp lap - fluid collections drained, cultures pending.  - has been afebrile >48hr, continue augmentin per ID recs, ID signing off        Severe protein-calorie malnutrition    - Poor PO intake since transplant requiring short course of TPN during previous hospital stay.   - albumin remains decreased but appetite slowly improving per pt.   - Dietary consulted. Will need to closely monitor PO intake.   - supplements also ordered.   - SHANE with tube feeding start 1/16. Stopped 1/17/18, was not tolerating. Shane removed 1/20.  - prealbumin 7 on 1/15.  - TPN started 1/16/18.   - Pt able to eat minimally 1/21 and 1/22 which is an improvement.  - gi consulted - EGD reviewed, increased ppi bid, miralax bid, and metamucil bid, decreased narcotics.  - continue reglan, placed SHANE tube with tube feeds - transitioned to nightly feeds. Not tolerating well due to nausea.   - small bowel follow through today - results pending           Acute renal failure with tubular necrosis    - HD resumed on previous admission. Now anuric and dialyzes M-W-F.  - Nephrology following.    - HD Sat since held Friday for thora/para.  - ktm consulted for possible kidney transplant.        Other cytomegaloviral diseases    - detected at 370 on CMV PCR 2/1.  - case discussed with ID and will hold off on treatment at this time given low WBC.   - undetected CMV PCR 2/8.  - continue Valcyte 200 mg every Mon, Wed, Fri.        Liver transplanted    - Post op course complicated by fevers and hyperbilirubinemia.  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  - AST/ALT normal. Tbili/alkphos remain elevated.  - Liver US 1/8 showed 3.9 cm complex fluid collection anterior to right lobe and moderate nonspecific complex ascites inferior to transplant.   - IR placed drain 1/11/18, cell count negative for infection.  - PBS  consulted. PBS not comfortable proceeding with EUS with liver biopsy given fever and possible infectious process going on. ERCP 1/17 with stone and sludge. Pt placed on Actigall - treated with 10 day course of abx.  ID now signed off.  - Bili elevated - liver u/s 1/28  - Liver biopsy 1/23 without rejection.  - consulted PBS for ERCP - Choledocholithiasis was found along with sludge, stents placed 1/30.  - T bili 2.1 Monitor.             Constipation    - Enema ordered 1/20 and 1/21 with BM.  - Encourage ambulation.  - decrease narcotics.  - d/c metamucil and miralax bid as makes patient nauseated.  - increase reglan qid.  - small bowel follow through today, results pending        Long-term use of immunosuppressant medication    - Maintenance IS with cyclosporine. MMF on hold for infections and neutropenia. Continue to check cyclosporine level daily. Assess for toxicity and adjust level as needed.        Prophylactic immunotherapy    - See long term use of immunosuppression.         Anemia of chronic disease    - Continue daily CBC.  - Previously transfused 1/30, 2/6, continue procrit.  - Given 1 U PRBC 2/6 with response in H/H.  - H&H 7/21.2. Transfuse 1 unit PRBCs.         Recurrent pleural effusion on right    - CXR in ER with large right pleural effusion with subjective c/o worsening SOB  - thoracentesis completed- 1200 ml removed.  SOB with significant improvement.   - Cell count reviewed and negative for infection.   - resp even and non labored.  O2 sat 94-98%.  - Repeat thora 1/19 negative for infection per cell count.  - Reaccumulating fluid on xray 1/21.  - thoracentesis 1/31 - 1.4L removed, negative for infection  - Reaccumulating fluid again on xray today 2/10. May need thoracentesis Monday 2/12.        Biliary stricture of transplanted liver    - tbili with increase prompting ERCP 1/30 with stones and sludge, stents placed.  - T bili now improving. Trend daily.         At risk for opportunistic infections     - CMV detected 2/1 now on tx dose valcyte.  - bactrim held.          Delayed surgical wound healing    - wd vac removed 1/12/18.  Wound healing well. Aquacel AG and Mepilex border dressing applied via wound care recs.           Other ascites    - Paracentesis performed 1/11/18 with 1600 ml removed.    - fluid negative for infection.   - Repeat para 1/19 negative for infection per cell count.  - no fluid seen for paracentesis on 1/23.        Nausea and vomiting    - h/o constipation, reports having regular BM's with bowel regimen.  - vomiting episode x 1 at home. Anti-emetics ordered PRN.  - KUB repeated 1/14/18 given increased abdominal pain- mild, generalized bowel distention suggesting ileus.  Diet changed to clears as tolerated for bowel rest .  - SHANE tube placed 1/16 for tube feeds. Not tolerating tube feeds, stopped 1/17/18.  - D/C shane 1/19, now in place as of 1/25.  - chronic constipation, Cont bowel regimen.   - GI consulted. See protein calorie malnutrition.  - not tolerating TFs due to nausea. Holding periodically overnight. Plan for SBFT tomorrow. NPO/hold TF at midnight.               VTE Risk Mitigation         Ordered     heparin (porcine) injection 1,000 Units  As needed (PRN)     Route:  Intra-Catheter        02/05/18 0945     heparin (porcine) injection 5,000 Units  Every 8 hours     Route:  Subcutaneous        01/31/18 0957     Medium Risk of VTE  Once      01/11/18 0351     Place sequential compression device  Until discontinued      01/11/18 0351          The patients clinical status was discussed at multidisplinary rounds, involving transplant surgery, transplant medicine, pharmacy, nursing, nutrition, and social work    Discharge Planning: not candidate at this time  Monitor kidney function --> may need outpt HD  Monitor HH needs vs rehab for deconditioned status      Seda Camacho, NP  Liver Transplant  [facility]

## 2018-02-13 LAB
ALBUMIN SERPL BCP-MCNC: 2.1 G/DL
ALP SERPL-CCNC: 109 U/L
ALT SERPL W/O P-5'-P-CCNC: <5 U/L
ANION GAP SERPL CALC-SCNC: 10 MMOL/L
AST SERPL-CCNC: 18 U/L
BASOPHILS # BLD AUTO: 0.06 K/UL
BASOPHILS NFR BLD: 1.8 %
BILIRUB SERPL-MCNC: 2 MG/DL
BUN SERPL-MCNC: 14 MG/DL
CALCIUM SERPL-MCNC: 8.9 MG/DL
CHLORIDE SERPL-SCNC: 104 MMOL/L
CO2 SERPL-SCNC: 23 MMOL/L
CREAT SERPL-MCNC: 3.3 MG/DL
CYCLOSPORINE BLD LC/MS/MS-MCNC: 216 NG/ML
DIFFERENTIAL METHOD: ABNORMAL
EOSINOPHIL # BLD AUTO: 0.3 K/UL
EOSINOPHIL NFR BLD: 8.9 %
ERYTHROCYTE [DISTWIDTH] IN BLOOD BY AUTOMATED COUNT: 14.5 %
EST. GFR  (AFRICAN AMERICAN): 27.8 ML/MIN/1.73 M^2
EST. GFR  (NON AFRICAN AMERICAN): 24.1 ML/MIN/1.73 M^2
GLUCOSE SERPL-MCNC: 93 MG/DL
HCT VFR BLD AUTO: 25.8 %
HGB BLD-MCNC: 8.4 G/DL
IMM GRANULOCYTES # BLD AUTO: 0.07 K/UL
IMM GRANULOCYTES NFR BLD AUTO: 2.1 %
LYMPHOCYTES # BLD AUTO: 1.1 K/UL
LYMPHOCYTES NFR BLD: 33.7 %
MAGNESIUM SERPL-MCNC: 1.8 MG/DL
MCH RBC QN AUTO: 30.7 PG
MCHC RBC AUTO-ENTMCNC: 32.6 G/DL
MCV RBC AUTO: 94 FL
MONOCYTES # BLD AUTO: 0.4 K/UL
MONOCYTES NFR BLD: 10.7 %
NEUTROPHILS # BLD AUTO: 1.4 K/UL
NEUTROPHILS NFR BLD: 42.8 %
NRBC BLD-RTO: 0 /100 WBC
PHOSPHATE SERPL-MCNC: 2.8 MG/DL
PLATELET # BLD AUTO: 210 K/UL
PMV BLD AUTO: 10 FL
POTASSIUM SERPL-SCNC: 3.7 MMOL/L
PROT SERPL-MCNC: 5.4 G/DL
RBC # BLD AUTO: 2.74 M/UL
SODIUM SERPL-SCNC: 137 MMOL/L
WBC # BLD AUTO: 3.26 K/UL

## 2018-02-13 PROCEDURE — 80158 DRUG ASSAY CYCLOSPORINE: CPT

## 2018-02-13 PROCEDURE — 36415 COLL VENOUS BLD VENIPUNCTURE: CPT

## 2018-02-13 PROCEDURE — 63600175 PHARM REV CODE 636 W HCPCS: Mod: JG | Performed by: SURGERY

## 2018-02-13 PROCEDURE — 25000003 PHARM REV CODE 250: Performed by: NURSE PRACTITIONER

## 2018-02-13 PROCEDURE — 99233 SBSQ HOSP IP/OBS HIGH 50: CPT | Mod: 24,,, | Performed by: NURSE PRACTITIONER

## 2018-02-13 PROCEDURE — 94664 DEMO&/EVAL PT USE INHALER: CPT

## 2018-02-13 PROCEDURE — 83735 ASSAY OF MAGNESIUM: CPT

## 2018-02-13 PROCEDURE — 85025 COMPLETE CBC W/AUTO DIFF WBC: CPT

## 2018-02-13 PROCEDURE — 63600175 PHARM REV CODE 636 W HCPCS: Performed by: PHYSICIAN ASSISTANT

## 2018-02-13 PROCEDURE — 99900035 HC TECH TIME PER 15 MIN (STAT)

## 2018-02-13 PROCEDURE — 84100 ASSAY OF PHOSPHORUS: CPT

## 2018-02-13 PROCEDURE — 80053 COMPREHEN METABOLIC PANEL: CPT

## 2018-02-13 PROCEDURE — 25000003 PHARM REV CODE 250: Performed by: SURGERY

## 2018-02-13 PROCEDURE — 20600001 HC STEP DOWN PRIVATE ROOM

## 2018-02-13 RX ORDER — AMOXICILLIN AND CLAVULANATE POTASSIUM 250; 62.5 MG/5ML; MG/5ML
500 POWDER, FOR SUSPENSION ORAL ONCE
Status: COMPLETED | OUTPATIENT
Start: 2018-02-13 | End: 2018-02-13

## 2018-02-13 RX ORDER — METOCLOPRAMIDE HYDROCHLORIDE 5 MG/5ML
5 SOLUTION ORAL
Status: DISCONTINUED | OUTPATIENT
Start: 2018-02-13 | End: 2018-02-24

## 2018-02-13 RX ORDER — AMOXICILLIN AND CLAVULANATE POTASSIUM 250; 62.5 MG/5ML; MG/5ML
500 POWDER, FOR SUSPENSION ORAL EVERY 24 HOURS
Status: DISCONTINUED | OUTPATIENT
Start: 2018-02-14 | End: 2018-02-17

## 2018-02-13 RX ADMIN — CYCLOSPORINE 175 MG: 100 SOLUTION ORAL at 05:02

## 2018-02-13 RX ADMIN — LEVETIRACETAM 500 MG: 100 SOLUTION ORAL at 08:02

## 2018-02-13 RX ADMIN — OXYCODONE HYDROCHLORIDE 5 MG: 5 TABLET ORAL at 08:02

## 2018-02-13 RX ADMIN — ATOVAQUONE 1500 MG: 750 SUSPENSION ORAL at 08:02

## 2018-02-13 RX ADMIN — OXYCODONE HYDROCHLORIDE 5 MG: 5 TABLET ORAL at 01:02

## 2018-02-13 RX ADMIN — HEPARIN SODIUM 5000 UNITS: 5000 INJECTION, SOLUTION INTRAVENOUS; SUBCUTANEOUS at 05:02

## 2018-02-13 RX ADMIN — OXYCODONE HYDROCHLORIDE 5 MG: 5 TABLET ORAL at 05:02

## 2018-02-13 RX ADMIN — URSODIOL 300 MG: 300 CAPSULE ORAL at 05:02

## 2018-02-13 RX ADMIN — METOCLOPRAMIDE HYDROCHLORIDE 5 MG: 5 SOLUTION ORAL at 08:02

## 2018-02-13 RX ADMIN — URSODIOL 300 MG: 300 CAPSULE ORAL at 02:02

## 2018-02-13 RX ADMIN — HEPARIN SODIUM 5000 UNITS: 5000 INJECTION, SOLUTION INTRAVENOUS; SUBCUTANEOUS at 08:02

## 2018-02-13 RX ADMIN — OXYCODONE HYDROCHLORIDE 5 MG: 5 TABLET ORAL at 11:02

## 2018-02-13 RX ADMIN — OMEPRAZOLE 40 MG: 40 CAPSULE, DELAYED RELEASE ORAL at 05:02

## 2018-02-13 RX ADMIN — AMOXICILLIN AND CLAVULANATE POTASSIUM 500 MG: 250; 62.5 POWDER, FOR SUSPENSION ORAL at 10:02

## 2018-02-13 RX ADMIN — LEVOTHYROXINE SODIUM 75 MCG: 75 TABLET ORAL at 05:02

## 2018-02-13 RX ADMIN — SODIUM CHLORIDE: 9 INJECTION, SOLUTION INTRAVENOUS at 01:02

## 2018-02-13 RX ADMIN — METOCLOPRAMIDE HYDROCHLORIDE 10 MG: 5 SOLUTION ORAL at 05:02

## 2018-02-13 RX ADMIN — METOCLOPRAMIDE HYDROCHLORIDE 5 MG: 5 SOLUTION ORAL at 05:02

## 2018-02-13 RX ADMIN — CYCLOSPORINE 175 MG: 100 SOLUTION ORAL at 08:02

## 2018-02-13 RX ADMIN — METOCLOPRAMIDE HYDROCHLORIDE 5 MG: 5 SOLUTION ORAL at 11:02

## 2018-02-13 RX ADMIN — URSODIOL 300 MG: 300 CAPSULE ORAL at 08:02

## 2018-02-13 RX ADMIN — HEPARIN SODIUM 5000 UNITS: 5000 INJECTION, SOLUTION INTRAVENOUS; SUBCUTANEOUS at 02:02

## 2018-02-13 NOTE — PROGRESS NOTES
[facility]  Infectious Disease  Progress Note    Patient Name: Jhonny Diana  MRN: [id number]  Admission Date: 1/11/2018  Length of Stay: 32 days  Attending Physician: Nathanael Medina MD  Primary Care Provider: Primary Doctor No    Isolation Status: No active isolations  Assessment/Plan:      Fever    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, CMV D+/R+, steroid induction, on maintenance tacro/MMF/pred; c/b seizures, LAURA, superificial wound infection s/p wound vac to [facility] through 1/12, and several recent admissions on 11/24 and 12/3 with culture negative peritonitis with peak WBC ~5k due to suspected indolent biliary leakage s/p sphincterotomy/biliary stent placement over CBD stricture on 12/6 ERCP although notably with no overt leak noted during procedure) who was admitted on 1/11/2017 with acute onset fevers and acute on chronic N/V/abdominal pain (RLQ worst) in the setting of persistent noninflammatory ascites of unknown etiology. He underwent repeated drainage of pleural fluid and ascites that did not show evidence of gross infection and given that fevers were antibiotic responsive and walled off biloma was suspected, he underwent ex-lap on 2/8 where a walled off bilious collection was noted. He is slowly improving post-operatively after washout.    - complete an additional week of augmentin and then stop antibiotics  - additional prophylaxis including isavuconazole per protocol              Anticipated Disposition: per primary team    Thank you for your consult. I will sign off. Please contact us if you have any additional questions.     Melanie Bergman MD  Transplant ID Attending  [id number]    Melanie Bergman MD  Infectious Disease  [facility]    Subjective:     Principal Problem:Fever    HPI: No notes on file  Interval History: Still notes nausea but able to eat yesterday now and walk some. Afebrile  (incorrect documentation in team notes). No positive cultures from washout.    Review of Systems   Constitutional: Negative for fever.   Cardiovascular: Positive for leg swelling.   Gastrointestinal: Positive for abdominal pain and nausea. Negative for diarrhea and vomiting.   Genitourinary:        Anuric     Allergic/Immunologic: Positive for immunocompromised state.     Objective:     Vital Signs (Most Recent):  Temp: 99.3 °F (37.4 °C) (02/12/18 1226)  Pulse: 84 (02/12/18 1226)  Resp: 16 (02/12/18 1226)  BP: (!) 145/81 (02/12/18 1226)  SpO2: 95 % (02/12/18 0727) Vital Signs (24h Range):  Temp:  [97.9 °F (36.6 °C)-99.8 °F (37.7 °C)] 99.3 °F (37.4 °C)  Pulse:  [78-91] 84  Resp:  [16-20] 16  SpO2:  [94 %-96 %] 95 %  BP: (120-145)/(64-88) 145/81     Weight: 70.1 kg (154 lb 8.7 oz)  Body mass index is 24.94 kg/m².    Estimated Creatinine Clearance: 18 mL/min (based on SCr of 5.5 mg/dL (H)).    Physical Exam   Constitutional: He is oriented to person, place, and time. He appears well-developed. No distress.   Temporal and distal extremity muscle wasting   HENT:   Head: Normocephalic and atraumatic.   Mouth/Throat: No oropharyngeal exudate.   Eyes: EOM are normal. Pupils are equal, round, and reactive to light. No scleral icterus.   Neck: Normal range of motion. Neck supple. No JVD present. No thyromegaly present.   Cardiovascular: Normal rate, regular rhythm, normal heart sounds and intact distal pulses.    No murmur heard.  Pulmonary/Chest: Effort normal. No respiratory distress. He has decreased breath sounds in the right middle field, the right lower field and the left lower field. He has no wheezes. He exhibits no tenderness.   Diminished to RLL   Abdominal: Soft. Bowel sounds are normal. He exhibits distension and ascites. There is tenderness (mild). There is no rebound and no guarding.   Exlap incision dressed. Leakage of ascites noted.   Musculoskeletal: Normal range of motion. He exhibits edema (2+ LE edema). He  exhibits no tenderness.   Neurological: He is alert and oriented to person, place, and time. He has normal reflexes.   Skin: Skin is warm and dry. He is not diaphoretic. No erythema.   Psychiatric: He has a normal mood and affect. His behavior is normal. Judgment and thought content normal. His mood appears not anxious.   Nursing note and vitals reviewed.      Significant Labs:   CBC:     Recent Labs  Lab 02/11/18  0333 02/12/18  0451   WBC 2.88* 3.53*   HGB 7.0* 7.9*   HCT 21.2* 24.1*    209     CMP:     Recent Labs  Lab 02/11/18  0333 02/12/18  0451    135*   K 3.2* 3.7   CL 99 99   CO2 26 24    68*   BUN 24* 30*   CREATININE 4.6* 5.5*   CALCIUM 9.1 9.3   PROT 5.5* 5.3*   ALBUMIN 2.3* 2.2*   BILITOT 1.9* 2.1*   ALKPHOS 84 93   AST 12 16   ALT <5* <5*   ANIONGAP 11 12   EGFRNONAA 16.1* 13.0*       Significant Imaging: I have reviewed all pertinent imaging results/findings within the past 24 hours.     CT CAP:   Multifocal patchy areas of consolidation in the left lung with a more confluent consolidation or atelectasis in the right lung.   Large right and small left pleural effusions which create local compressive atelectasis.  Simple collection about the inferior margin of the liver similar to the prior ultrasound.  Other incidental findings including biliary stent placement, pneumobilia, cardiomegaly, right and left sided central venous catheters, and body wall anasarca.    Microbiology:  1/11 blood cx: negative  1/11 pleural fluid cx: negative  1/11 ascites cx: negative  1/17 blood cx: negative  1/18 blood cx: negative  1/19 pleural fluid cx: NGTD  1/19 ascites cx: NGTD  2/8 OR cx: NGTD    Review of Systems   Unable to perform ROS: Other     Physical Exam   Vitals reviewed.

## 2018-02-13 NOTE — ASSESSMENT & PLAN NOTE
- Post op course complicated by fevers and hyperbilirubinemia.  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  - AST/ALT normal. Tbili/alkphos remain elevated.  - Liver US 1/8 showed 3.9 cm complex fluid collection anterior to right lobe and moderate nonspecific complex ascites inferior to transplant.   - IR placed drain 1/11/18, cell count negative for infection.  - PBS consulted. PBS not comfortable proceeding with EUS with liver biopsy given fever and possible infectious process going on. ERCP 1/17 with stone and sludge. Pt placed on Actigall - treated with 10 day course of abx.  ID now signed off.  - Bili elevated - liver u/s 1/28  - Liver biopsy 1/23 without rejection.  - consulted PBS for ERCP - Choledocholithiasis was found along with sludge, stents placed 1/30.  - T bili stable Monitor.

## 2018-02-13 NOTE — ASSESSMENT & PLAN NOTE
- h/o constipation, reports having regular BM's with bowel regimen.  - vomiting episode x 1 at home. Anti-emetics ordered PRN.  - KUB repeated 1/14/18 given increased abdominal pain- mild, generalized bowel distention suggesting ileus.  Diet changed to clears as tolerated for bowel rest .  - SHANE tube placed 1/16 for tube feeds. Not tolerating tube feeds, stopped 1/17/18.  - D/C shane 1/19, now in place as of 1/25.  - chronic constipation, Cont bowel regimen.   - GI consulted. See protein calorie malnutrition.  - not tolerating TFs due to nausea. Holding periodically overnight.

## 2018-02-13 NOTE — ASSESSMENT & PLAN NOTE
- Enema ordered 1/20 and 1/21 with BM.  - Encourage ambulation.  - decrease narcotics.  - d/c metamucil and miralax bid as makes patient nauseated.  - increase reglan qid.  - small bowel follow through with normal findings.

## 2018-02-13 NOTE — PROGRESS NOTES
[facility]  Liver Transplant  Progress Note    Patient Name: Jhonny Diana  MRN: [id number]  Admission Date: 2018  Hospital Length of Stay: 33 days  Code Status: Full Code  Primary Care Provider: Primary Doctor No  Post-Operative Day: 117    ORGAN:   LIVER  Disease Etiology: Acute Alcoholic Hepatitis  Donor Type:    - Brain Death  CDC High Risk:   No  Donor CMV Status:   Donor CMV Status: Positive  Donor HBcAB:   Negative  Donor HCV Status:   Negative  Whole or Partial: Whole Liver  Biliary Anastomosis: End to End  Arterial Anatomy: Standard  Subjective:     History of Present Illness:  Jhonny Diana is a 26 y/o male with past medical history of alcoholic cirrhosis.  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 Found to have peritonitis in November (WBC 5000, 75% PNM) neg for bile leak. He was treated initially with vanc/cefepime. Repeat cell counts  with some improvement (WBC 1400, 45% PNM). He has undergone multiple paracenteses as well as abscess drainage of perihepatic fluid collections and treated with antimicrobial therapy but no positive cultures. Of note, biliary stricture also identified and ERCP performed on 2017 with sphincterotomy and biliary stent placed. Liver tests still have not normalized despite intervention, bilirubin and AP remain elevated. Other pertinent PMH current wound vac in place 2/2 wound infection, malnutrition requiring TPN for short course and ongoing hypoalbuminemia, and seizure activity while on prograf and has since been switched to cyclosporine without reoccurrence.  He presented to the ER for fever, abdominal pain, and N/V. He reports fever (103) for 1  day prior. Overnight, he developed N/V, reports small amount of green emesis with new left sided pain. He also endorses worsening SOB with exertion. He was scheduled as an outpatient for follow up paracentesis and IR drainage of fluid collection. CXR in ER shows large pleural effusion with subsegmental atelectasis. Infectious work up initiated in ER. His ANC is 900. Broad spectrum antibiotics initiated in ED. He denies chest pain, palpitations, diarrhea, constipation, or back pain. Denies any sick contacts.    Hospital Course:  Thoracentesis (1.2L off), Paracentesis (1.6L off), and IR drainage of fluid collection 1/11, all fluids negative for infection. ID consulted. Broad spectrum antibiotics d/c'd 1/15. Chronically malnourished with poor PO intake, prealbumin 7. Shane tube placed 1/15 for tube feedings.     ERCP 1/17 with sludge and a biliary stone which was removed and stent exchanged.  Remained with n/v; therefore, TF remained on hold and TPN continued.   Pt with fever s/p ERCP on 1/17 which continued until 1/19.  Vanc/cefepime restarted.  Blood cx 1/17 and 1/18 NGTD.  ID reconsulted.  Fungal markers sent.  CT C/A/P obtained.  With large R pleural effusion and ascites- both drained 1/19 and negative for infx per cell count. Pt afebrile 1/20. Liver biopsy 1/23 - without rejection.  EGD 1/25 - unremarkable for source of GI symptoms.  Resume diet along with other GI recommendations. SHANE tube placed for tube feeds.       Interval History:  No acute events overnight. Afebrile overnight. Remains with fatigue this am. Cont with Zosyn and Vanc. Blood cultures NGTD. Taken or OR 2/7/18, multiple collections drained. Fluid sent for culture pending. ID following. Transitioned to nocturnal TFs to encourage eating during the day. Pt not tolerating TFs well, held a some time overnight due to severe nausea. Small bowel follow through reviewed and will need to discuss case with GI to see about further imaging options. Monitor.      Scheduled Meds:   sodium chloride 0.9%   Intravenous Once    [START ON 2/14/2018] amoxicillin-pot clavulanate 250-62.5 mg/5ml  500 mg Oral Daily    atovaquone  1,500 mg Oral Daily    cycloSPORINE  175 mg Oral BID    docusate sodium  100 mg Oral BID    epoetin maurisio (PROCRIT) injection  10,000 Units Intravenous Every Mon, Wed, Fri    heparin (porcine)  5,000 Units Subcutaneous Q8H    custom IVPB builder   Intravenous Q24H    levetiracetam oral soln  500 mg Oral BID    levothyroxine  75 mcg Oral Before breakfast    metoclopramide HCl  5 mg Oral QID (AC & HS)    omeprazole  40 mg Oral QAM    polyethylene glycol  17 g Oral BID    psyllium husk (aspartame)  3.4 g Oral BID    ursodiol  300 mg Oral TID    valganciclovir 50 mg/ml  200 mg Oral Every Mon, Wed, Fri     Continuous Infusions:  PRN Meds:sodium chloride, sodium chloride 0.9%, acetaminophen, albuterol-ipratropium 2.5mg-0.5mg/3mL, bisacodyl, heparin (porcine), omnipaque, ondansetron, ondansetron, oxyCODONE, prochlorperazine, simethicone, sodium chloride 0.9%, sodium chloride 0.9%    Review of Systems   Constitutional: Positive for activity change, appetite change, fatigue and fever. Negative for chills.   HENT: Negative.  Negative for congestion and facial swelling.    Eyes: Negative for pain, discharge and visual disturbance.   Respiratory: Negative for cough, chest tightness, shortness of breath and wheezing.    Cardiovascular: Negative for chest pain, palpitations and leg swelling.   Gastrointestinal: Positive for abdominal distention, abdominal pain, constipation and nausea. Negative for vomiting.   Endocrine: Negative.    Genitourinary: Positive for decreased urine volume. Negative for difficulty urinating.        Anuric   Musculoskeletal: Negative for arthralgias, back pain and myalgias.   Skin: Positive for color change and wound. Negative for rash.   Allergic/Immunologic: Positive for immunocompromised state.   Neurological: Negative for  dizziness, seizures, weakness and headaches.   Psychiatric/Behavioral: Positive for decreased concentration and dysphoric mood. Negative for confusion. The patient is not nervous/anxious.    All other systems reviewed and are negative.    Objective:     Vital Signs (Most Recent):  Temp: 99.3 °F (37.4 °C) (02/13/18 0804)  Pulse: 85 (02/13/18 0804)  Resp: 16 (02/13/18 0804)  BP: 121/73 (02/13/18 0804)  SpO2: 96 % (02/13/18 1136) Vital Signs (24h Range):  Temp:  [97.7 °F (36.5 °C)-99.4 °F (37.4 °C)] 99.3 °F (37.4 °C)  Pulse:  [68-97] 85  Resp:  [16-18] 16  SpO2:  [94 %-97 %] 96 %  BP: (121-136)/(71-78) 121/73     Weight: 66.3 kg (146 lb 2.6 oz)  Body mass index is 23.59 kg/m².    Intake/Output - Last 3 Shifts       02/11 0700 - 02/12 0659 02/12 0700 - 02/13 0659 02/13 0700 - 02/14 0659    P.O. 240 50 50    I.V. (mL/kg)       Blood 348.3      Other  700     NG/  120    IV Piggyback 250      Total Intake(mL/kg) 988.3 (14.1) 750 (11.3) 170 (2.6)    Urine (mL/kg/hr) 55 (0) 0 (0) 0 (0)    Emesis/NG output  0 (0) 0 (0)    Drains 90 (0.1) 80 (0.1) 30 (0.1)    Other  2700 (1.7) 0 (0)    Stool 0 (0) 0 (0) 0 (0)    Blood   0 (0)    Total Output 145 2780 30    Net +843.3 -2030 +140           Urine Occurrence  0 x 0 x    Stool Occurrence 0 x 0 x 0 x    Emesis Occurrence  0 x 0 x          Physical Exam   Constitutional: He is oriented to person, place, and time. He appears well-developed. No distress.   Temporal and distal extremity muscle wasting   HENT:   Head: Normocephalic and atraumatic.   Mouth/Throat: No oropharyngeal exudate.   dylan tube   Eyes: EOM are normal. Pupils are equal, round, and reactive to light. Scleral icterus is present.   Neck: Normal range of motion. Neck supple. No JVD present. No thyromegaly present.   Cardiovascular: Normal rate, regular rhythm, normal heart sounds and intact distal pulses.    No murmur heard.  Pulmonary/Chest: Effort normal. No respiratory distress. He has decreased breath sounds  in the right middle field, the right lower field and the left lower field. He has no wheezes. He exhibits no tenderness.   Diminished to RLL   Abdominal: Soft. Bowel sounds are normal. He exhibits ascites. He exhibits no distension. There is tenderness (mild). There is no rebound and no guarding.   Dressing to chevron.  Wd vac removed 1/12/18  Dependent edema present R flank; surrounding erythema   Musculoskeletal: Normal range of motion. He exhibits edema (2+ LE edema). He exhibits no tenderness.   Neurological: He is alert and oriented to person, place, and time. He has normal reflexes.   Skin: Skin is warm and dry. Capillary refill takes 2 to 3 seconds. He is not diaphoretic. No erythema.   jaundice   Psychiatric: He has a normal mood and affect. His behavior is normal. Judgment and thought content normal. His mood appears not anxious.   Depressed mood   Nursing note and vitals reviewed.      Laboratory:  Immunosuppressants         Stop Route Frequency     cycloSPORINE (NEORAL) 100 mg/mL microemulsion solution 175 mg      -- Oral 2 times daily        CBC:   Recent Labs  Lab 02/13/18  0547   WBC 3.26*   RBC 2.74*   HGB 8.4*   HCT 25.8*      MCV 94   MCH 30.7   MCHC 32.6     CMP:   Recent Labs  Lab 02/13/18  0547   GLU 93   CALCIUM 8.9   ALBUMIN 2.1*   PROT 5.4*      K 3.7   CO2 23      BUN 14   CREATININE 3.3*   ALKPHOS 109   ALT <5*   AST 18   BILITOT 2.0*     Coagulation: No results for input(s): PT, INR, APTT in the last 168 hours.  Labs within the past 24 hours have been reviewed.    Diagnostic Results:  None    Assessment/Plan:     Biliary stricture of transplanted liver    - tbili with increase prompting ERCP 1/30 with stones and sludge, stents placed.  - T bili now improving. Trend daily.         Liver transplanted    - Post op course complicated by fevers and hyperbilirubinemia.  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  - AST/ALT normal. Tbili/alkphos remain  elevated.  - Liver US 1/8 showed 3.9 cm complex fluid collection anterior to right lobe and moderate nonspecific complex ascites inferior to transplant.   - IR placed drain 1/11/18, cell count negative for infection.  - PBS consulted. PBS not comfortable proceeding with EUS with liver biopsy given fever and possible infectious process going on. ERCP 1/17 with stone and sludge. Pt placed on Actigall - treated with 10 day course of abx.  ID now signed off.  - Bili elevated - liver u/s 1/28  - Liver biopsy 1/23 without rejection.  - consulted PBS for ERCP - Choledocholithiasis was found along with sludge, stents placed 1/30.  - T bili stable Monitor.             Prophylactic immunotherapy    - See long term use of immunosuppression.         Long-term use of immunosuppressant medication    - Maintenance IS with cyclosporine. MMF on hold for infections and neutropenia. Continue to check cyclosporine level daily. Assess for toxicity and adjust level as needed.        At risk for opportunistic infections    - CMV detected 2/1 now on tx dose valcyte.  - bactrim held.          Other cytomegaloviral diseases    - detected at 370 on CMV PCR 2/1.  - case discussed with ID and will hold off on treatment at this time given low WBC.   - undetected CMV PCR 2/8.  - continue Valcyte 200 mg every Mon, Wed, Fri.        Fever    - Started on vanc/cefepime for ERCP. Now transitioned to vanc/zosyn.  - Blood cultures sent, prelim NGTD. Repeat blood cultures and chest xray today 2/10.  - thoracentesis 2/1 - fluid negative for infection.  - atelectasis noted on ct scan - start breathing tx & CPT.  - now cmv positive - started treatment 2/5/18.  - taken to OR 2/7 for exp lap - fluid collections drained, cultures pending.  - has been afebrile >48hr, continue augmentin per ID recs, ID signing off        Constipation    - Enema ordered 1/20 and 1/21 with BM.  - Encourage ambulation.  - decrease narcotics.  - d/c metamucil and miralax bid as  makes patient nauseated.  - increase reglan qid.  - small bowel follow through with normal findings.         Other ascites    - Paracentesis performed 1/11/18 with 1600 ml removed.    - fluid negative for infection.   - Repeat para 1/19 negative for infection per cell count.  - no fluid seen for paracentesis on 1/23.        Severe protein-calorie malnutrition    - Poor PO intake since transplant requiring short course of TPN during previous hospital stay.   - albumin remains decreased but appetite slowly improving per pt.   - Dietary consulted. Will need to closely monitor PO intake.   - supplements also ordered.   - SHANE with tube feeding start 1/16. Stopped 1/17/18, was not tolerating. Shane removed 1/20.  - prealbumin 7 on 1/15.  - TPN started 1/16/18.   - Pt able to eat minimally 1/21 and 1/22 which is an improvement.  - gi consulted - EGD reviewed, increased ppi bid, miralax bid, and metamucil bid, decreased narcotics.  - continue reglan, placed SHANE tube with tube feeds - transitioned to nightly feeds. Not tolerating well due to nausea.   - small bowel follow through with normal findings.           Nausea and vomiting    - h/o constipation, reports having regular BM's with bowel regimen.  - vomiting episode x 1 at home. Anti-emetics ordered PRN.  - KUB repeated 1/14/18 given increased abdominal pain- mild, generalized bowel distention suggesting ileus.  Diet changed to clears as tolerated for bowel rest .  - SHANE tube placed 1/16 for tube feeds. Not tolerating tube feeds, stopped 1/17/18.  - D/C shane 1/19, now in place as of 1/25.  - chronic constipation, Cont bowel regimen.   - GI consulted. See protein calorie malnutrition.  - not tolerating TFs due to nausea. Holding periodically overnight.         Recurrent pleural effusion on right    - CXR in ER with large right pleural effusion with subjective c/o worsening SOB  - thoracentesis completed- 1200 ml removed.  SOB with significant improvement.   - Cell count  reviewed and negative for infection.   - resp even and non labored.  O2 sat 94-98%.  - Repeat thora 1/19 negative for infection per cell count.  - Reaccumulating fluid on xray 1/21.  - thoracentesis 1/31 - 1.4L removed, negative for infection  - Reaccumulating fluid again on xray today 2/10. May need thoracentesis again        Anemia of chronic disease    - Continue daily CBC.  - Previously transfused 1/30, 2/6, continue procrit.  - Given 1 U PRBC 2/6 with response in H/H.  - H&H 7/21.2. Transfuse 1 unit PRBCs.         Delayed surgical wound healing    - wd vac removed 1/12/18.  Wound healing well. Aquacel AG and Mepilex border dressing applied via wound care recs.           Acute renal failure with tubular necrosis    - HD resumed on previous admission. Now anuric and dialyzes M-W-F.  - Nephrology following.    - HD Sat since held Friday for thora/para.  - ktm consulted for possible kidney transplant.            VTE Risk Mitigation         Ordered     heparin (porcine) injection 1,000 Units  As needed (PRN)     Route:  Intra-Catheter        02/05/18 0945     heparin (porcine) injection 5,000 Units  Every 8 hours     Route:  Subcutaneous        01/31/18 0957     Medium Risk of VTE  Once      01/11/18 0351     Place sequential compression device  Until discontinued      01/11/18 0351          The patients clinical status was discussed at multidisplinary rounds, involving transplant surgery, transplant medicine, pharmacy, nursing, nutrition, and social work    Discharge Planning:  Not a candidate for d/c at this time.     Jin Perez NP  Liver Transplant  [facility]

## 2018-02-13 NOTE — PLAN OF CARE
Pt AAOx4, VSS. Afebrile. On RA w/ O2 sats >95%.    R nare w/ keotube in place secured with adhesive device. Orders for nocturnal tube feedings @ 55 cc/hr.  Feedings held at certain parts of the night last night secondary to issues w/ nausea.    Small bowel follow through done yesterday.  Results read w/ normal findings.  Plan to follow-up with GI regarding next step.  Regular diet ordered.  Pt not tolerating at this time.  Intake encouraged throughout the day.  Pts mother encouraged to bring in food from outside the hospital.  Reglan continued.  Old chevron incision w/ decreased wound healing to R side.  Orders to change dressing Tues/Friday.  Done today by floor RN.  To be changed again by wound care RN on Friday.    IRISH to R side w/ 60 cc SS output so far this shift.  Leaking around site noted.  Gauze dressing in place, changed x2 this shift.  Pt c/o abdominal discomfort and wound pain 6-7/10.  PRN oxy 5 mg given q4h.  Moderate relief obtained 5/10.   HD tomorrow (scheduled for M/W/F).  Last done yesterday w/ 2L off.    Pt anuric.  No BM today.  Per pt, 2 loose BMs overnight.  Pt refusing stool softeners or laxatives.  Dressing to R upper arm midline changed.  Site CDI, no s/s of infection noted.  Next dressing change due 2/20.    R chest wall permacath, HD only, CDI.  Dressing to be changed next 2/19.  Activity and ambulation encouraged.  Pt seen walking the hallway once so far this shift.  Bed lowered and locked in place.  Pt sleeping most of the day.  Non-skid socks on feet.  Call bell in reach.  Pt instructed to call nurse for assistance.  Understanding verbalized.  Standard precautions maintained.  Hand hygiene performed.  No acute events/falls/injuries.  See flowsheet for further assessment findings.  Will continue to monitor.

## 2018-02-13 NOTE — PLAN OF CARE
AAO x 4. VSS, afebrile, SpO2>95% on RA. R manasa linton with nocturnal TFs at 55 mL/hr. Small bowel follow through pending. PO oxycodone administered when available. R ab IRISH with serosanguinous output. Fall precautions maintained and pt repositions self. See flowsheet for assessment findings. Will continue to monitor.

## 2018-02-13 NOTE — ASSESSMENT & PLAN NOTE
- CXR in ER with large right pleural effusion with subjective c/o worsening SOB  - thoracentesis completed- 1200 ml removed.  SOB with significant improvement.   - Cell count reviewed and negative for infection.   - resp even and non labored.  O2 sat 94-98%.  - Repeat thora 1/19 negative for infection per cell count.  - Reaccumulating fluid on xray 1/21.  - thoracentesis 1/31 - 1.4L removed, negative for infection  - Reaccumulating fluid again on xray today 2/10. May need thoracentesis again

## 2018-02-13 NOTE — PROGRESS NOTES
Pt refusing amoxicillin this am secondary to swallowing issues with pill. Requesting oral suspension. Jin Perez NP notified.     Orders to be placed by NP.  Will monitor.

## 2018-02-13 NOTE — SUBJECTIVE & OBJECTIVE
Scheduled Meds:   sodium chloride 0.9%   Intravenous Once    [START ON 2/14/2018] amoxicillin-pot clavulanate 250-62.5 mg/5ml  500 mg Oral Daily    atovaquone  1,500 mg Oral Daily    cycloSPORINE  175 mg Oral BID    docusate sodium  100 mg Oral BID    epoetin maurisio (PROCRIT) injection  10,000 Units Intravenous Every Mon, Wed, Fri    heparin (porcine)  5,000 Units Subcutaneous Q8H    custom IVPB builder   Intravenous Q24H    levetiracetam oral soln  500 mg Oral BID    levothyroxine  75 mcg Oral Before breakfast    metoclopramide HCl  5 mg Oral QID (AC & HS)    omeprazole  40 mg Oral QAM    polyethylene glycol  17 g Oral BID    psyllium husk (aspartame)  3.4 g Oral BID    ursodiol  300 mg Oral TID    valganciclovir 50 mg/ml  200 mg Oral Every Mon, Wed, Fri     Continuous Infusions:  PRN Meds:sodium chloride, sodium chloride 0.9%, acetaminophen, albuterol-ipratropium 2.5mg-0.5mg/3mL, bisacodyl, heparin (porcine), omnipaque, ondansetron, ondansetron, oxyCODONE, prochlorperazine, simethicone, sodium chloride 0.9%, sodium chloride 0.9%    Review of Systems   Constitutional: Positive for activity change, appetite change, fatigue and fever. Negative for chills.   HENT: Negative.  Negative for congestion and facial swelling.    Eyes: Negative for pain, discharge and visual disturbance.   Respiratory: Negative for cough, chest tightness, shortness of breath and wheezing.    Cardiovascular: Negative for chest pain, palpitations and leg swelling.   Gastrointestinal: Positive for abdominal distention, abdominal pain, constipation and nausea. Negative for vomiting.   Endocrine: Negative.    Genitourinary: Positive for decreased urine volume. Negative for difficulty urinating.        Anuric   Musculoskeletal: Negative for arthralgias, back pain and myalgias.   Skin: Positive for color change and wound. Negative for rash.   Allergic/Immunologic: Positive for immunocompromised state.   Neurological: Negative for  dizziness, seizures, weakness and headaches.   Psychiatric/Behavioral: Positive for decreased concentration and dysphoric mood. Negative for confusion. The patient is not nervous/anxious.    All other systems reviewed and are negative.    Objective:     Vital Signs (Most Recent):  Temp: 99.3 °F (37.4 °C) (02/13/18 0804)  Pulse: 85 (02/13/18 0804)  Resp: 16 (02/13/18 0804)  BP: 121/73 (02/13/18 0804)  SpO2: 96 % (02/13/18 1136) Vital Signs (24h Range):  Temp:  [97.7 °F (36.5 °C)-99.4 °F (37.4 °C)] 99.3 °F (37.4 °C)  Pulse:  [68-97] 85  Resp:  [16-18] 16  SpO2:  [94 %-97 %] 96 %  BP: (121-136)/(71-78) 121/73     Weight: 66.3 kg (146 lb 2.6 oz)  Body mass index is 23.59 kg/m².    Intake/Output - Last 3 Shifts       02/11 0700 - 02/12 0659 02/12 0700 - 02/13 0659 02/13 0700 - 02/14 0659    P.O. 240 50 50    I.V. (mL/kg)       Blood 348.3      Other  700     NG/  120    IV Piggyback 250      Total Intake(mL/kg) 988.3 (14.1) 750 (11.3) 170 (2.6)    Urine (mL/kg/hr) 55 (0) 0 (0) 0 (0)    Emesis/NG output  0 (0) 0 (0)    Drains 90 (0.1) 80 (0.1) 30 (0.1)    Other  2700 (1.7) 0 (0)    Stool 0 (0) 0 (0) 0 (0)    Blood   0 (0)    Total Output 145 2780 30    Net +843.3 -2030 +140           Urine Occurrence  0 x 0 x    Stool Occurrence 0 x 0 x 0 x    Emesis Occurrence  0 x 0 x          Physical Exam   Constitutional: He is oriented to person, place, and time. He appears well-developed. No distress.   Temporal and distal extremity muscle wasting   HENT:   Head: Normocephalic and atraumatic.   Mouth/Throat: No oropharyngeal exudate.   dylan tube   Eyes: EOM are normal. Pupils are equal, round, and reactive to light. Scleral icterus is present.   Neck: Normal range of motion. Neck supple. No JVD present. No thyromegaly present.   Cardiovascular: Normal rate, regular rhythm, normal heart sounds and intact distal pulses.    No murmur heard.  Pulmonary/Chest: Effort normal. No respiratory distress. He has decreased breath sounds  in the right middle field, the right lower field and the left lower field. He has no wheezes. He exhibits no tenderness.   Diminished to RLL   Abdominal: Soft. Bowel sounds are normal. He exhibits ascites. He exhibits no distension. There is tenderness (mild). There is no rebound and no guarding.   Dressing to chevron.  Wd vac removed 1/12/18  Dependent edema present R flank; surrounding erythema   Musculoskeletal: Normal range of motion. He exhibits edema (2+ LE edema). He exhibits no tenderness.   Neurological: He is alert and oriented to person, place, and time. He has normal reflexes.   Skin: Skin is warm and dry. Capillary refill takes 2 to 3 seconds. He is not diaphoretic. No erythema.   jaundice   Psychiatric: He has a normal mood and affect. His behavior is normal. Judgment and thought content normal. His mood appears not anxious.   Depressed mood   Nursing note and vitals reviewed.      Laboratory:  Immunosuppressants         Stop Route Frequency     cycloSPORINE (NEORAL) 100 mg/mL microemulsion solution 175 mg      -- Oral 2 times daily        CBC:   Recent Labs  Lab 02/13/18  0547   WBC 3.26*   RBC 2.74*   HGB 8.4*   HCT 25.8*      MCV 94   MCH 30.7   MCHC 32.6     CMP:   Recent Labs  Lab 02/13/18  0547   GLU 93   CALCIUM 8.9   ALBUMIN 2.1*   PROT 5.4*      K 3.7   CO2 23      BUN 14   CREATININE 3.3*   ALKPHOS 109   ALT <5*   AST 18   BILITOT 2.0*     Coagulation: No results for input(s): PT, INR, APTT in the last 168 hours.  Labs within the past 24 hours have been reviewed.    Diagnostic Results:  None

## 2018-02-13 NOTE — ASSESSMENT & PLAN NOTE
- Poor PO intake since transplant requiring short course of TPN during previous hospital stay.   - albumin remains decreased but appetite slowly improving per pt.   - Dietary consulted. Will need to closely monitor PO intake.   - supplements also ordered.   - SHANE with tube feeding start 1/16. Stopped 1/17/18, was not tolerating. Shane removed 1/20.  - prealbumin 7 on 1/15.  - TPN started 1/16/18.   - Pt able to eat minimally 1/21 and 1/22 which is an improvement.  - gi consulted - EGD reviewed, increased ppi bid, miralax bid, and metamucil bid, decreased narcotics.  - continue reglan, placed SHANE tube with tube feeds - transitioned to nightly feeds. Not tolerating well due to nausea.   - small bowel follow through with normal findings.

## 2018-02-14 LAB
ALBUMIN SERPL BCP-MCNC: 2.1 G/DL
ALP SERPL-CCNC: 127 U/L
ALT SERPL W/O P-5'-P-CCNC: <5 U/L
ANION GAP SERPL CALC-SCNC: 13 MMOL/L
AST SERPL-CCNC: 18 U/L
BACTERIA SPEC ANAEROBE CULT: NORMAL
BASOPHILS # BLD AUTO: 0.05 K/UL
BASOPHILS NFR BLD: 1.5 %
BILIRUB SERPL-MCNC: 2.2 MG/DL
BUN SERPL-MCNC: 20 MG/DL
CALCIUM SERPL-MCNC: 9.6 MG/DL
CHLORIDE SERPL-SCNC: 102 MMOL/L
CO2 SERPL-SCNC: 21 MMOL/L
CREAT SERPL-MCNC: 4.4 MG/DL
CYCLOSPORINE BLD LC/MS/MS-MCNC: 556 NG/ML
DIFFERENTIAL METHOD: ABNORMAL
EOSINOPHIL # BLD AUTO: 0.3 K/UL
EOSINOPHIL NFR BLD: 7.9 %
ERYTHROCYTE [DISTWIDTH] IN BLOOD BY AUTOMATED COUNT: 14.3 %
EST. GFR  (AFRICAN AMERICAN): 19.7 ML/MIN/1.73 M^2
EST. GFR  (NON AFRICAN AMERICAN): 17 ML/MIN/1.73 M^2
FUNGUS SPEC CULT: NORMAL
GLUCOSE SERPL-MCNC: 85 MG/DL
HCT VFR BLD AUTO: 27.6 %
HGB BLD-MCNC: 9.1 G/DL
IMM GRANULOCYTES # BLD AUTO: 0.05 K/UL
IMM GRANULOCYTES NFR BLD AUTO: 1.5 %
LYMPHOCYTES # BLD AUTO: 1.1 K/UL
LYMPHOCYTES NFR BLD: 31.8 %
MAGNESIUM SERPL-MCNC: 1.8 MG/DL
MCH RBC QN AUTO: 30.7 PG
MCHC RBC AUTO-ENTMCNC: 33 G/DL
MCV RBC AUTO: 93 FL
MONOCYTES # BLD AUTO: 0.3 K/UL
MONOCYTES NFR BLD: 9.9 %
NEUTROPHILS # BLD AUTO: 1.6 K/UL
NEUTROPHILS NFR BLD: 47.4 %
NRBC BLD-RTO: 0 /100 WBC
PHOSPHATE SERPL-MCNC: 3.7 MG/DL
PLATELET # BLD AUTO: 215 K/UL
PMV BLD AUTO: 10.1 FL
POTASSIUM SERPL-SCNC: 3.8 MMOL/L
PROT SERPL-MCNC: 5.7 G/DL
RBC # BLD AUTO: 2.96 M/UL
SODIUM SERPL-SCNC: 136 MMOL/L
WBC # BLD AUTO: 3.43 K/UL

## 2018-02-14 PROCEDURE — 63600175 PHARM REV CODE 636 W HCPCS: Performed by: NURSE PRACTITIONER

## 2018-02-14 PROCEDURE — 90935 HEMODIALYSIS ONE EVALUATION: CPT | Mod: ,,, | Performed by: INTERNAL MEDICINE

## 2018-02-14 PROCEDURE — 83735 ASSAY OF MAGNESIUM: CPT

## 2018-02-14 PROCEDURE — 80053 COMPREHEN METABOLIC PANEL: CPT

## 2018-02-14 PROCEDURE — 25000003 PHARM REV CODE 250: Performed by: NURSE PRACTITIONER

## 2018-02-14 PROCEDURE — 90935 HEMODIALYSIS ONE EVALUATION: CPT

## 2018-02-14 PROCEDURE — 25000003 PHARM REV CODE 250: Performed by: SURGERY

## 2018-02-14 PROCEDURE — 96372 THER/PROPH/DIAG INJ SC/IM: CPT

## 2018-02-14 PROCEDURE — 85025 COMPLETE CBC W/AUTO DIFF WBC: CPT

## 2018-02-14 PROCEDURE — 63600175 PHARM REV CODE 636 W HCPCS: Performed by: PHYSICIAN ASSISTANT

## 2018-02-14 PROCEDURE — 63600175 PHARM REV CODE 636 W HCPCS: Mod: JG | Performed by: SURGERY

## 2018-02-14 PROCEDURE — 99233 SBSQ HOSP IP/OBS HIGH 50: CPT | Mod: 24,,, | Performed by: NURSE PRACTITIONER

## 2018-02-14 PROCEDURE — 20600001 HC STEP DOWN PRIVATE ROOM

## 2018-02-14 PROCEDURE — 36415 COLL VENOUS BLD VENIPUNCTURE: CPT

## 2018-02-14 PROCEDURE — 84100 ASSAY OF PHOSPHORUS: CPT

## 2018-02-14 PROCEDURE — 97803 MED NUTRITION INDIV SUBSEQ: CPT | Performed by: DIETITIAN, REGISTERED

## 2018-02-14 PROCEDURE — 80158 DRUG ASSAY CYCLOSPORINE: CPT

## 2018-02-14 RX ORDER — OXYCODONE HYDROCHLORIDE 5 MG/1
5 TABLET ORAL EVERY 6 HOURS PRN
Status: DISCONTINUED | OUTPATIENT
Start: 2018-02-14 | End: 2018-02-18

## 2018-02-14 RX ORDER — DRONABINOL 2.5 MG/1
2.5 CAPSULE ORAL
Status: DISCONTINUED | OUTPATIENT
Start: 2018-02-14 | End: 2018-02-17

## 2018-02-14 RX ORDER — SODIUM CHLORIDE 9 MG/ML
INJECTION, SOLUTION INTRAVENOUS
Status: DISCONTINUED | OUTPATIENT
Start: 2018-02-14 | End: 2018-02-16

## 2018-02-14 RX ORDER — SODIUM CHLORIDE 9 MG/ML
INJECTION, SOLUTION INTRAVENOUS ONCE
Status: DISCONTINUED | OUTPATIENT
Start: 2018-02-14 | End: 2018-02-16

## 2018-02-14 RX ADMIN — OXYCODONE HYDROCHLORIDE 5 MG: 5 TABLET ORAL at 04:02

## 2018-02-14 RX ADMIN — OMEPRAZOLE 40 MG: 40 CAPSULE, DELAYED RELEASE ORAL at 06:02

## 2018-02-14 RX ADMIN — METOCLOPRAMIDE HYDROCHLORIDE 5 MG: 5 SOLUTION ORAL at 01:02

## 2018-02-14 RX ADMIN — CYCLOSPORINE 175 MG: 100 SOLUTION ORAL at 06:02

## 2018-02-14 RX ADMIN — HEPARIN SODIUM 1000 UNITS: 1000 INJECTION, SOLUTION INTRAVENOUS; SUBCUTANEOUS at 09:02

## 2018-02-14 RX ADMIN — METOCLOPRAMIDE HYDROCHLORIDE 5 MG: 5 SOLUTION ORAL at 06:02

## 2018-02-14 RX ADMIN — URSODIOL 300 MG: 300 CAPSULE ORAL at 06:02

## 2018-02-14 RX ADMIN — ATOVAQUONE 1500 MG: 750 SUSPENSION ORAL at 01:02

## 2018-02-14 RX ADMIN — URSODIOL 300 MG: 300 CAPSULE ORAL at 01:02

## 2018-02-14 RX ADMIN — MORPHINE 200 MG: 10 SOLUTION ORAL at 07:02

## 2018-02-14 RX ADMIN — HEPARIN SODIUM 5000 UNITS: 5000 INJECTION, SOLUTION INTRAVENOUS; SUBCUTANEOUS at 06:02

## 2018-02-14 RX ADMIN — HEPARIN SODIUM 5000 UNITS: 5000 INJECTION, SOLUTION INTRAVENOUS; SUBCUTANEOUS at 01:02

## 2018-02-14 RX ADMIN — LEVETIRACETAM 500 MG: 100 SOLUTION ORAL at 01:02

## 2018-02-14 RX ADMIN — DRONABINOL 2.5 MG: 2.5 CAPSULE ORAL at 04:02

## 2018-02-14 RX ADMIN — ERYTHROPOIETIN 10000 UNITS: 10000 INJECTION, SOLUTION INTRAVENOUS; SUBCUTANEOUS at 09:02

## 2018-02-14 RX ADMIN — LEVETIRACETAM 500 MG: 100 SOLUTION ORAL at 09:02

## 2018-02-14 RX ADMIN — AMOXICILLIN AND CLAVULANATE POTASSIUM 500 MG: 250; 62.5 POWDER, FOR SUSPENSION ORAL at 07:02

## 2018-02-14 RX ADMIN — URSODIOL 300 MG: 300 CAPSULE ORAL at 09:02

## 2018-02-14 RX ADMIN — OXYCODONE HYDROCHLORIDE 5 MG: 5 TABLET ORAL at 06:02

## 2018-02-14 RX ADMIN — CYCLOSPORINE 175 MG: 100 SOLUTION ORAL at 07:02

## 2018-02-14 RX ADMIN — DRONABINOL 2.5 MG: 2.5 CAPSULE ORAL at 09:02

## 2018-02-14 RX ADMIN — METOCLOPRAMIDE HYDROCHLORIDE 5 MG: 5 SOLUTION ORAL at 09:02

## 2018-02-14 RX ADMIN — SODIUM CHLORIDE: 9 INJECTION, SOLUTION INTRAVENOUS at 03:02

## 2018-02-14 RX ADMIN — LEVOTHYROXINE SODIUM 75 MCG: 75 TABLET ORAL at 06:02

## 2018-02-14 NOTE — NURSING
PLEASE NOTE:  Labs being drawn now by venipuncture - expect cyclosporine level to be elevated (was administered early for dialysis)

## 2018-02-14 NOTE — ASSESSMENT & PLAN NOTE
- Started on vanc/cefepime for ERCP. Now transitioned to vanc/zosyn.  - Blood cultures sent, prelim NGTD. Repeat blood cultures and chest xray today 2/10.  - thoracentesis 2/1 - fluid negative for infection.  - atelectasis noted on ct scan - start breathing tx & CPT.  - now cmv positive - started treatment 2/5/18.  - taken to OR 2/7 for exp lap - fluid collections drained, cultures pending.  - has been afebrile >48hr, continue augmentin x 7 days per ID recs, ID signing off

## 2018-02-14 NOTE — PLAN OF CARE
O x 4. VSS, afebrile, SpO2>95% on RA. R manasa linton with nocturnal TFs at 30 mL/hr (goal 55). PO oxycodone administered when available. R ab IRISH with serosanguinous output. Fall precautions maintained and pt repositions self. See flowsheet for assessment findings. HD in AM. Will continue to monitor.

## 2018-02-14 NOTE — NURSING
Report received from SISSY Mayfield in HD unit.  Ran patient for abbreviated (2hr) treatment & removed 600 mL per RN report.

## 2018-02-14 NOTE — ASSESSMENT & PLAN NOTE
- Enema ordered 1/20 and 1/21 with BM.  - Encourage ambulation.  - decrease narcotics.  - d/c metamucil and miralax bid as makes patient nauseated.  - increase reglan qid.  - small bowel follow through with normal findings.   - patient reports bowel movement 2/13

## 2018-02-14 NOTE — SUBJECTIVE & OBJECTIVE
Scheduled Meds:   sodium chloride 0.9%   Intravenous Once    sodium chloride 0.9%   Intravenous Once    amoxicillin-pot clavulanate 250-62.5 mg/5ml  500 mg Oral Daily    atovaquone  1,500 mg Oral Daily    cycloSPORINE  175 mg Oral BID    docusate sodium  100 mg Oral BID    dronabinol  2.5 mg Oral BID AC    epoetin maurisio (PROCRIT) injection  10,000 Units Intravenous Every Mon, Wed, Fri    heparin (porcine)  5,000 Units Subcutaneous Q8H    custom IVPB builder   Intravenous Q24H    levetiracetam oral soln  500 mg Oral BID    levothyroxine  75 mcg Oral Before breakfast    metoclopramide HCl  5 mg Oral QID (AC & HS)    omeprazole  40 mg Oral QAM    polyethylene glycol  17 g Oral BID    psyllium husk (aspartame)  3.4 g Oral BID    ursodiol  300 mg Oral TID    valganciclovir 50 mg/ml  200 mg Oral Every Mon, Wed, Fri     Continuous Infusions:  PRN Meds:sodium chloride, sodium chloride 0.9%, sodium chloride 0.9%, acetaminophen, albuterol-ipratropium 2.5mg-0.5mg/3mL, bisacodyl, heparin (porcine), omnipaque, ondansetron, ondansetron, oxyCODONE, prochlorperazine, simethicone, sodium chloride 0.9%, sodium chloride 0.9%    Review of Systems   Constitutional: Positive for activity change, appetite change, fatigue and fever. Negative for chills.   HENT: Negative.  Negative for congestion and facial swelling.    Eyes: Negative for pain, discharge and visual disturbance.   Respiratory: Negative for cough, chest tightness, shortness of breath and wheezing.    Cardiovascular: Negative for chest pain, palpitations and leg swelling.   Gastrointestinal: Positive for abdominal distention, abdominal pain and nausea. Negative for constipation and vomiting.   Endocrine: Negative.    Genitourinary: Positive for decreased urine volume. Negative for difficulty urinating.        Anuric   Musculoskeletal: Negative for arthralgias, back pain and myalgias.   Skin: Positive for color change and wound. Negative for rash.    Allergic/Immunologic: Positive for immunocompromised state.   Neurological: Negative for dizziness, seizures, weakness and headaches.   Psychiatric/Behavioral: Positive for decreased concentration and dysphoric mood. Negative for confusion. The patient is not nervous/anxious.    All other systems reviewed and are negative.    Objective:     Vital Signs (Most Recent):  Temp: 98.9 °F (37.2 °C) (02/14/18 1233)  Pulse: 92 (02/14/18 1233)  Resp: 16 (02/14/18 1233)  BP: 132/78 (02/14/18 1233)  SpO2: 97 % (02/14/18 1233) Vital Signs (24h Range):  Temp:  [97.9 °F (36.6 °C)-99.5 °F (37.5 °C)] 98.9 °F (37.2 °C)  Pulse:  [77-92] 92  Resp:  [16-18] 16  SpO2:  [94 %-97 %] 97 %  BP: (125-144)/(73-86) 132/78     Weight: 66.5 kg (146 lb 9.7 oz)  Body mass index is 23.66 kg/m².    Intake/Output - Last 3 Shifts       02/12 0700 - 02/13 0659 02/13 0700 - 02/14 0659 02/14 0700 - 02/15 0659    P.O. 50 200     I.V. (mL/kg)  0 (0)     Blood       Other 700 0 400    NG/GT  180 300    IV Piggyback       Total Intake(mL/kg) 750 (11.3) 380 (5.7) 700 (10.5)    Urine (mL/kg/hr) 0 (0) 0 (0) 0 (0)    Emesis/NG output 0 (0) 0 (0)     Drains 80 (0.1) 60 (0) 50 (0.1)    Other 2700 (1.7) 0 (0) 1000 (1.7)    Stool 0 (0) 0 (0) 0 (0)    Blood  0 (0)     Total Output 2780 60 1050    Net -2030 +320 -350           Urine Occurrence 0 x 0 x 0 x    Stool Occurrence 0 x 0 x 0 x    Emesis Occurrence 0 x 0 x           Physical Exam   Constitutional: He is oriented to person, place, and time. He appears well-developed. No distress.   Temporal and distal extremity muscle wasting   HENT:   Head: Normocephalic and atraumatic.   Mouth/Throat: No oropharyngeal exudate.   dylan tube   Eyes: EOM are normal. Pupils are equal, round, and reactive to light. Scleral icterus is present.   Neck: Normal range of motion. Neck supple. No JVD present. No thyromegaly present.   Cardiovascular: Normal rate, regular rhythm, normal heart sounds and intact distal pulses.    No murmur  heard.  Pulmonary/Chest: Effort normal. No respiratory distress. He has decreased breath sounds in the right middle field, the right lower field and the left lower field. He has no wheezes. He exhibits no tenderness.   Diminished to RLL   Abdominal: Soft. Bowel sounds are normal. He exhibits ascites. He exhibits no distension. There is tenderness (mild). There is no rebound and no guarding.   Dressing to chevron.  Wd vac removed 1/12/18  Dependent edema present R flank; surrounding erythema   Musculoskeletal: Normal range of motion. He exhibits edema (2+ LE edema). He exhibits no tenderness.   Neurological: He is alert and oriented to person, place, and time. He has normal reflexes.   Skin: Skin is warm and dry. Capillary refill takes 2 to 3 seconds. He is not diaphoretic. No erythema.   jaundice   Psychiatric: He has a normal mood and affect. His behavior is normal. Judgment and thought content normal. His mood appears not anxious.   Depressed mood   Nursing note and vitals reviewed.      Laboratory:  Immunosuppressants         Stop Route Frequency     cycloSPORINE (NEORAL) 100 mg/mL microemulsion solution 175 mg      -- Oral 2 times daily        CBC:     Recent Labs  Lab 02/14/18  0733   WBC 3.43*   RBC 2.96*   HGB 9.1*   HCT 27.6*      MCV 93   MCH 30.7   MCHC 33.0     CMP:     Recent Labs  Lab 02/14/18  0733   GLU 85   CALCIUM 9.6   ALBUMIN 2.1*   PROT 5.7*      K 3.8   CO2 21*      BUN 20   CREATININE 4.4*   ALKPHOS 127   ALT <5*   AST 18   BILITOT 2.2*     Coagulation: No results for input(s): PT, INR, APTT in the last 168 hours.  Labs within the past 24 hours have been reviewed.    Diagnostic Results:  None

## 2018-02-14 NOTE — PROGRESS NOTES
"[facility]  Adult Nutrition  Progress Note    SUMMARY     Recommendations    Recommendation/Intervention:   1. When G-J tube placed, resume previous order of Novasource at 55 ml/hr x 12 hrs and assess for tolerance.     2. If pt still unable to tolerate and renal electrolytes remain WNL, consider trialing Peptamen Prebio at a goal rate of 60 ml/hr x 14 hrs to provide 1260 kcal, 57 gm pro, and 647 ml free water.     3. Encourage PO intake as tolerated.     4. RD following.    Goals: Consume/tolerate > 85% EEN and EPN  Nutrition Goal Status: goal not met  Communication of RD Recs: reviewed with physician    Reason for Assessment    Reason for Assessment: RD follow-up  Diagnosis: transplant/postoperative complications  Relevant Medical History: OLTx 10/19/2017, complicate post-op w/ LAURA ongoing, chronic malnutrition, cirrhosis 2' EtOH   Interdisciplinary Rounds: did not attend    General Information Comments: Pt and family member present at time of visit. Family member reports pt with almost no PO intake, occasionally eats a little of outside foods brought in by family. TF held overnight due to pt GI discomfort. Family member describes GI distress as "gassy." Last HD this am. GI consult in place for G-J tube for placement tomorrow.    Nutrition Discharge Planning: Adequate nutrition PO vs EN    Nutrition Prescription Ordered    Current Diet Order: Regular  Nutrition Order Comments: TF held  Current Nutrition Support Formula Ordered: Novasource Renal  Current Nutrition Support Rate Ordered: 55 (ml)  Current Nutrition Support Frequency Ordered: ml/hr x 12 hrs  Oral Nutrition Supplement: Boost Breeze     Evaluation of Received Nutrients/Fluid Intake         Fluid Required:  (per MD)  Comments: LBM 2/12  Tolerance: not tolerating  % Intake of Estimated Energy Needs: 0 - 25 %  % Meal Intake: 0%     Nutrition Risk Screen     Nutrition Risk Screen: large or nonhealing wound, burn or pressure " "ulcer    Nutrition/Diet History    Patient Reported Diet/Restrictions/Preferences: low salt  Typical Food/Fluid Intake: eats very little at this time  Food Preferences: No cultural or Orthodox food preferences noted  Meal/Snack Patterns: pt likes to cook, when feeling well will cook and then only eat bites  Supplemental Drinks or Food Habits:  (protein powders sometimes)  Factors Affecting Nutritional Intake: altered gastrointestinal function, abdominal distention, decreased appetite, nausea/vomiting                Labs/Tests/Procedures/Meds       Pertinent Labs Reviewed: reviewed, pertinent  Pertinent Labs Comments: Crt 4.4, Alb 2.1, T.Bili 2.2  Pertinent Medications Reviewed: reviewed, pertinent  Pertinent Medications Comments: docusate, Marinol, epo, levothyroxine, Reglan, omeprazole, polyethylene glycol, psyllim    Physical Findings    Overall Physical Appearance: generalized wasting, loss of muscle mass, loss of subcutaneous fat, weak  Tubes: nasoduoduenal tube  Oral/Mouth Cavity: WDL  Skin: intact    Anthropometrics    Temp: 98.4 °F (36.9 °C)     Height: 5' 6" (167.6 cm)  Weight Method: Bed Scale  Weight: 66.5 kg (146 lb 9.7 oz)  Ideal Body Weight (IBW), Male: 142 lb     % Ideal Body Weight, Male (lb): 112.68 lb     BMI (Calculated): 25.9  BMI Grade: 25 - 29.9 - overweight  Weight Loss: unintentional  Usual Body Weight (UBW), k kg (2017 )     % Usual Body Weight: 86.71  % Weight Change From Usual Weight: -13.47 %             Estimated/Assessed Needs    Weight Used For Calorie Calculations: 62.3 kg (137 lb 5.6 oz)   Height (cm): 167.6 cm  Energy Calorie Requirements (kcal): 7856-0331 (30-35 kcal/kg)  Energy Need Method: Kcal/kg      RMR ([place]-St. Jeor Equation): 1535.75        Weight Used For Protein Calculations: 62.3 kg (137 lb 5.6 oz)  Protein Requirements: 81-94g (1.3-1.5 g/kg)  Fluid Requirements (mL): 1mL/kcal  Fluid Need Method: RDA Method        RDA Method (mL):            "     Assessment and Plan    Severe protein-calorie malnutrition    Nutrition Diagnosis  Inadequate oral intake    Related to (etiology):   Decreased appetite, abdominal distention    Signs and Symptoms (as evidenced by):   Pt eating 0% of meals and relying on enteral nutrition for 100% of EPN, EEN     Interventions/Recommendations (treatment strategy):  See recs    Nutrition Diagnosis Status:   Continues            Monitor and Evaluation    Food and Nutrient Intake: energy intake, food and beverage intake  Food and Nutrient Adminstration: diet order  Knowledge/Beliefs/Attitudes: food and nutrition knowledge/skill  Physical Activity and Function: nutrition-related ADLs and IADLs  Anthropometric Measurements: weight, weight change, body mass index  Biochemical Data, Medical Tests and Procedures: electrolyte and renal panel, lipid profile, gastrointestinal profile, glucose/endocrine profile, inflammatory profile  Nutrition-Focused Physical Findings: overall appearance    Nutrition Risk    Level of Risk:  (2x/week)    Nutrition Follow-Up    RD Follow-up?: Yes

## 2018-02-14 NOTE — PLAN OF CARE
"Problem: Patient Care Overview  Goal: Plan of Care Review  Outcome: Ongoing (interventions implemented as appropriate)  - Tmax 99.5, monitor with routine VS  - R nare Waterproof tube, nocturnal TF  - Started Marinol this afternoon to promote appetite, monitor for efficacy  - If Marinol not effective, may need J/G tube placement Fri or Mon  - Chevron ANDRZEJ, right side chevron wound with CDI mepilex dressing in place.  Dressing change Tues/Fri  - HD done today, MWKAROL shedule  - Monitor trends of labs.  Do not administer cyclosporine prior to labs (even if labs drawn late)  - Bed low & locked, call light in reach, nonslip socks in use, calls for assistance appropriately, mother at bedside, standby assist, ambulates throughout day  - Oral intake today so far is 1 small bottle of water & an Icee  - Pt states he has a BM "nearly every day"  Pt & mother both state he had x1 yesterday (charted by this nurse for yesterday) & 1x so far today.  He is refusing colace, miralax, and metamucil  - Hand hygiene per protocol followed by pt, mother, and staff  - Oxycodone PRN interval increased to Q6H.  Pt's requests for pain meds have been less frequent. Monitor for efficacy with new regimen.  - No nausea reported today, no vomitting  - Monitor for pressure wounds, assist with turns as indicated, encourage mobility and nutrition      "

## 2018-02-14 NOTE — ASSESSMENT & PLAN NOTE
- Poor PO intake since transplant requiring short course of TPN during previous hospital stay.   - albumin remains decreased but appetite slowly improving per pt.   - Dietary consulted. Will need to closely monitor PO intake.   - supplements also ordered.   - SHANE with tube feeding start 1/16. Stopped 1/17/18, was not tolerating. Shane removed 1/20.  - prealbumin 7 on 1/15.  - TPN started 1/16/18.   - Pt able to eat minimally 1/21 and 1/22 which is an improvement.  - gi consulted - EGD reviewed, increased ppi bid, miralax bid, and metamucil bid, decreased narcotics.  - continue reglan, placed SHANE tube with tube feeds - transitioned to nightly feeds. Not tolerating well due to nausea.   - small bowel follow through with normal findings.   - consulted gi for possible gj tube placement  - will attempt marinol today, if unsuccessful for 2 days will place gj tube

## 2018-02-14 NOTE — PROGRESS NOTES
SW presented to patient for follow up and continuity of care.  The patient was observed to be standing in the room coming from the restroom alert, oriented x4 and communicative. The patient made appropriate eye contact when communicating with SW.  The patient was alone at this time reporting that his mother assumed he'd be in HD for the full four hours and did not stay over.  Pt reported his caregiver went over to the  apartments for clothes.  SW provided emotional support, reflective listening and normalization. EZEKIEL obtained the completed 2728 from the pt.  The pt reported adequate coping stating that he hopes he will be able to eat to work on getting out of the hospital.  Pt denied any needs at this time.  There were no further psychosocial issues identified. No discharge plans identified at this time. SW remains available.     EZEKIEL provided completed 2728 to Radha SANCHEZ LMSW at her request.

## 2018-02-14 NOTE — CONSULTS
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   GI and AES were consulted previously during this admission for evaluation of nausea and decreased appetite, started patient on miralax and protonix.     Patient's postoperative course has been complicated by seizures (swtiched off prograf to cyclo), ATN requiring HD (M-W-F, last 1/10, anuric), superficial wound infection s/p wound vac to chevron incision, and multiple admissions for fevers on 11/21 (discharged on empiric augmentin for suspected superficial wound infection), readmitted 11/24 again with fever, and 12/3. Found to have peritonitis in November (WBC 5000, 75% PNM) negative for bile leak and was treated initially with vanc/cefepime.  He has undergone multiple paracenteses as well as abscess drainage of perihepatic fluid collections and treated with antimicrobial therapy but no positive cultures. The patient underwent an ERCP on 12/6/2017 which showed a 1mm biliary anastomotic stricture - one [id number] plastic stent placed; however, bilirubin and AP remained elevated. ERCP also completed 1/17/18 with almost complete resolution of the anastomotic stricture.  Choledocholithiasis was found and complete removal was accomplished by balloon extraction. One plastic stent, 10Fr x 9cm was placed across the anastomosis as requested by the transplant service.   Most recently, patient had EGD on 01/25, which did not show any  "concerning findings, ERCP on 01/30 with stent removed from the bilary tree and findings of choledocholithiasis with complete removal during the procedure and placement of two plastic stents in the common bile duct, small bowel follow-through 02/11 with normal gastric emptying, and diagnostic laparoscopy 02/07 with findings of "Turbid dark appearing fluid in pelvis, loculated area around kidney entered without significant return of fluid - sent for studies".     Patient has been intermittently on tube feeds via his NG tube, but he overall has not well-tolerated the feeds; therefore, GI has been consulted for placement of a G-J tube for long-term nutrition.       Past Medical History:   Diagnosis Date    Alcoholic hepatitis with ascites     Alcoholic hepatitis with ascites     History of hematemesis 9/28/2017    Hypertension     Renal disorder        Past Surgical History:   Procedure Laterality Date    APPENDECTOMY      ERCP      ESOPHAGOGASTRODUODENOSCOPY      LIVER TRANSPLANT         Review of patient's allergies indicates:   Allergen Reactions    Bactrim [sulfamethoxazole-trimethoprim] Other (See Comments)     Mookie Trell Syndrome     Family History     None        Social History Main Topics    Smoking status: Former Smoker     Packs/day: 1.00     Years: 10.00     Types: Cigarettes    Smokeless tobacco: Never Used    Alcohol use No      Comment: a fifth of liquor daily for years, cut back over last 2 months    Drug use: No    Sexual activity: Not on file     Review of Systems   Constitutional: Positive for activity change, appetite change, fatigue and fever. Negative for chills.   HENT: Negative.  Negative for congestion and facial swelling.    Eyes: Negative for pain, discharge and visual disturbance.   Respiratory: Negative for cough, chest tightness, shortness of breath and wheezing.    Cardiovascular: Negative for chest pain, palpitations and leg swelling.   Gastrointestinal: Positive for " abdominal distention, abdominal pain, constipation and nausea. Negative for vomiting.   Endocrine: Negative.    Genitourinary: Positive for decreased urine volume. Negative for difficulty urinating.        Anuric   Musculoskeletal: Negative for arthralgias, back pain and myalgias.   Skin: Positive for color change and wound. Negative for rash.   Allergic/Immunologic: Positive for immunocompromised state.   Neurological: Negative for dizziness, seizures, weakness and headaches.   Psychiatric/Behavioral: Positive for decreased concentration and dysphoric mood. Negative for confusion. The patient is not nervous/anxious.    All other systems reviewed and are negative.    Objective:     Vital Signs (Most Recent):  Temp: 98.9 °F (37.2 °C) (02/14/18 1020)  Pulse: 84 (02/14/18 1020)  Resp: 18 (02/14/18 1020)  BP: 135/86 (02/14/18 1020)  SpO2: 95 % (02/14/18 0730) Vital Signs (24h Range):  Temp:  [97.9 °F (36.6 °C)-99.5 °F (37.5 °C)] 98.9 °F (37.2 °C)  Pulse:  [] 84  Resp:  [16-18] 18  SpO2:  [94 %-96 %] 95 %  BP: (123-144)/(73-86) 135/86     Weight: 66.5 kg (146 lb 9.7 oz) (02/14/18 0615)  Body mass index is 23.66 kg/m².      Intake/Output Summary (Last 24 hours) at 02/14/18 1125  Last data filed at 02/14/18 1020   Gross per 24 hour   Intake              910 ml   Output             1080 ml   Net             -170 ml       Lines/Drains/Airways     Central Venous Catheter Line                 Hemodialysis Catheter right internal jugular -- days         Trialysis (Dialysis) Catheter right internal jugular -- days          Drain                 Trans Pyloric Feeding Tube 01/26/18 nasogastric 19 days         Closed/Suction Drain 02/07/18 1459 Right;Inferior Abdomen Bulb 15 Fr. 6 days          Peripheral Intravenous Line                 Midline Catheter Insertion/Assessment  - Single Lumen 02/06/18 0917 Left brachial vein 18g x 8cm 8 days                Physical Exam   Constitutional: He is oriented to person, place, and  time. He appears well-developed. No distress.   HENT:   Head: Normocephalic and atraumatic.   Eyes: Conjunctivae are normal. Pupils are equal, round, and reactive to light.   Neck: Trachea normal. Neck supple. No JVD present.   Cardiovascular: Normal rate, regular rhythm, S1 normal, S2 normal, normal heart sounds, intact distal pulses and normal pulses.  Exam reveals no gallop and no friction rub.    No murmur heard.  Pulmonary/Chest: Effort normal and breath sounds normal.   Abdominal: Soft. Bowel sounds are normal. He exhibits distension.   Well-healed transverse abdominal scar from past liver transplant   Drain in place    Musculoskeletal: Normal range of motion. He exhibits edema (1+ pitting edema).   Neurological: He is alert and oriented to person, place, and time.   Skin: Skin is warm and dry. Capillary refill takes less than 2 seconds.   Psychiatric: He has a normal mood and affect. His behavior is normal.   Vitals reviewed.    Significant Labs:  CBC:   Recent Labs  Lab 02/13/18  0547 02/14/18  0733   WBC 3.26* 3.43*   HGB 8.4* 9.1*   HCT 25.8* 27.6*    215     BMP:   Recent Labs  Lab 02/14/18  0733   GLU 85      K 3.8      CO2 21*   BUN 20   CREATININE 4.4*   CALCIUM 9.6   MG 1.8     CMP:   Recent Labs  Lab 02/14/18  0733   GLU 85   CALCIUM 9.6   ALBUMIN 2.1*   PROT 5.7*      K 3.8   CO2 21*      BUN 20   CREATININE 4.4*   ALKPHOS 127   ALT <5*   AST 18   BILITOT 2.2*     Coagulation: No results for input(s): PT, INR, APTT in the last 48 hours.    Significant Imaging:  Imaging results within the past 24 hours have been reviewed.    Assessment/Plan:     Nausea and vomiting    - patient with poor PO intake since transplantation   - had short course of TPN during previous hospitalization   - SHANE with tube feeding started 01/16 and stopped 01/17 due to intolerance; SHANE was removed 01/20 and placed back 01/25  - TPN started 01/16  - GI was previously consulted and increased PPI and  "added miralax and metamucil  - patient currently receiving feeds through KEOfeed tube; however, he does not like it - he is intermittently able to eat by mouth but not enough to have adequate nutrition in the longterm  - small bowel follow-through 02/11: "Mild small bowel dilatation with normal small bowel transit time."  - EGD most recently performed 01/25: with no abnormal findings   - ERCP on 01/30:     "One stent from the biliary tree was seen in the                         major papilla.                        - One stent was removed from the biliary tree.                        - Choledocholithiasis was found. Complete removal                         was accomplished by balloon extraction. No                         cholangiographic evidence for stricture at                         anastomosis.                        - Two plastic stents were placed into the common                         bile duct (given quick repeat occurence of                         stone/sludge since last ERCP, stents placed to                         optimize drainage should this occur again)."  - diagnostic laparoscopy (02/07):  Findings of "Turbid dark appearing fluid in pelvis, loculated area around kidney entered without significant return of fluid - sent for studies"  - GI consulted for possible GJ tube placement for feeding   - NPO at midnight with plan for GJ tube placement tomorrow 02/15            Thank you for your consult. I will follow-up with patient. Please contact us if you have any additional questions.    Ninoska Mathews MD  Gastroenterology  [facility]  "

## 2018-02-14 NOTE — PROGRESS NOTES
[facility]  Liver Transplant  Progress Note    Patient Name: Jhonny Diana  MRN: [id number]  Admission Date: 2018  Hospital Length of Stay: 34 days  Code Status: Full Code  Primary Care Provider: Primary Doctor No  Post-Operative Day: 118    ORGAN:   LIVER  Disease Etiology: Acute Alcoholic Hepatitis  Donor Type:    - Brain Death  CDC High Risk:   No  Donor CMV Status:   Donor CMV Status: Positive  Donor HBcAB:   Negative  Donor HCV Status:   Negative  Whole or Partial: Whole Liver  Biliary Anastomosis: End to End  Arterial Anatomy: Standard  Subjective:     History of Present Illness:  Jhonny Diana is a 28 y/o male with past medical history of alcoholic cirrhosis.  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 Found to have peritonitis in November (WBC 5000, 75% PNM) neg for bile leak. He was treated initially with vanc/cefepime. Repeat cell counts  with some improvement (WBC 1400, 45% PNM). He has undergone multiple paracenteses as well as abscess drainage of perihepatic fluid collections and treated with antimicrobial therapy but no positive cultures. Of note, biliary stricture also identified and ERCP performed on 2017 with sphincterotomy and biliary stent placed. Liver tests still have not normalized despite intervention, bilirubin and AP remain elevated. Other pertinent PMH current wound vac in place 2/2 wound infection, malnutrition requiring TPN for short course and ongoing hypoalbuminemia, and seizure activity while on prograf and has since been switched to cyclosporine without reoccurrence.  He presented to the ER for fever, abdominal pain, and N/V. He reports fever (103) for 1  day prior. Overnight, he developed N/V, reports small amount of green emesis with new left sided pain. He also endorses worsening SOB with exertion. He was scheduled as an outpatient for follow up paracentesis and IR drainage of fluid collection. CXR in ER shows large pleural effusion with subsegmental atelectasis. Infectious work up initiated in ER. His ANC is 900. Broad spectrum antibiotics initiated in ED. He denies chest pain, palpitations, diarrhea, constipation, or back pain. Denies any sick contacts.    Hospital Course:  Thoracentesis (1.2L off), Paracentesis (1.6L off), and IR drainage of fluid collection 1/11, all fluids negative for infection. ID consulted. Broad spectrum antibiotics d/c'd 1/15. Chronically malnourished with poor PO intake, prealbumin 7. Shane tube placed 1/15 for tube feedings.     ERCP 1/17 with sludge and a biliary stone which was removed and stent exchanged.  Remained with n/v; therefore, TF remained on hold and TPN continued.   Pt with fever s/p ERCP on 1/17 which continued until 1/19.  Vanc/cefepime restarted.  Blood cx 1/17 and 1/18 NGTD.  ID reconsulted.  Fungal markers sent.  CT C/A/P obtained.  With large R pleural effusion and ascites- both drained 1/19 and negative for infx per cell count. Pt afebrile 1/20. Liver biopsy 1/23 - without rejection.  EGD 1/25 - unremarkable for source of GI symptoms.  Resume diet along with other GI recommendations. SHANE tube placed for tube feeds.       Interval History:  No acute events overnight. Afebrile on augmentin - plan to treat x 7 days. Blood cultures NGTD. Taken or OR 2/7/18, multiple collections drained. Fluid sent for culture & ngtd. Transitioned to nocturnal TFs to encourage eating during the day. Pt not tolerating TFs well, held for some time overnight due to severe nausea. Small bowel follow through reviewed.  GI consulted for possible gj tube placement.  Will begin marinol today, if unsuccessful response.  Plan for gj tube placement  Friday or Monday by GI.  Monitor.     Scheduled Meds:   sodium chloride 0.9%   Intravenous Once    sodium chloride 0.9%   Intravenous Once    amoxicillin-pot clavulanate 250-62.5 mg/5ml  500 mg Oral Daily    atovaquone  1,500 mg Oral Daily    cycloSPORINE  175 mg Oral BID    docusate sodium  100 mg Oral BID    dronabinol  2.5 mg Oral BID AC    epoetin maurisio (PROCRIT) injection  10,000 Units Intravenous Every Mon, Wed, Fri    heparin (porcine)  5,000 Units Subcutaneous Q8H    custom IVPB builder   Intravenous Q24H    levetiracetam oral soln  500 mg Oral BID    levothyroxine  75 mcg Oral Before breakfast    metoclopramide HCl  5 mg Oral QID (AC & HS)    omeprazole  40 mg Oral QAM    polyethylene glycol  17 g Oral BID    psyllium husk (aspartame)  3.4 g Oral BID    ursodiol  300 mg Oral TID    valganciclovir 50 mg/ml  200 mg Oral Every Mon, Wed, Fri     Continuous Infusions:  PRN Meds:sodium chloride, sodium chloride 0.9%, sodium chloride 0.9%, acetaminophen, albuterol-ipratropium 2.5mg-0.5mg/3mL, bisacodyl, heparin (porcine), omnipaque, ondansetron, ondansetron, oxyCODONE, prochlorperazine, simethicone, sodium chloride 0.9%, sodium chloride 0.9%    Review of Systems   Constitutional: Positive for activity change, appetite change, fatigue and fever. Negative for chills.   HENT: Negative.  Negative for congestion and facial swelling.    Eyes: Negative for pain, discharge and visual disturbance.   Respiratory: Negative for cough, chest tightness, shortness of breath and wheezing.    Cardiovascular: Negative for chest pain, palpitations and leg swelling.   Gastrointestinal: Positive for abdominal distention, abdominal pain and nausea. Negative for constipation and vomiting.   Endocrine: Negative.    Genitourinary: Positive for decreased urine volume. Negative for difficulty urinating.        Anuric   Musculoskeletal: Negative for arthralgias, back pain and myalgias.   Skin: Positive for color change and  wound. Negative for rash.   Allergic/Immunologic: Positive for immunocompromised state.   Neurological: Negative for dizziness, seizures, weakness and headaches.   Psychiatric/Behavioral: Positive for decreased concentration and dysphoric mood. Negative for confusion. The patient is not nervous/anxious.    All other systems reviewed and are negative.    Objective:     Vital Signs (Most Recent):  Temp: 98.9 °F (37.2 °C) (02/14/18 1233)  Pulse: 92 (02/14/18 1233)  Resp: 16 (02/14/18 1233)  BP: 132/78 (02/14/18 1233)  SpO2: 97 % (02/14/18 1233) Vital Signs (24h Range):  Temp:  [97.9 °F (36.6 °C)-99.5 °F (37.5 °C)] 98.9 °F (37.2 °C)  Pulse:  [77-92] 92  Resp:  [16-18] 16  SpO2:  [94 %-97 %] 97 %  BP: (125-144)/(73-86) 132/78     Weight: 66.5 kg (146 lb 9.7 oz)  Body mass index is 23.66 kg/m².    Intake/Output - Last 3 Shifts       02/12 0700 - 02/13 0659 02/13 0700 - 02/14 0659 02/14 0700 - 02/15 0659    P.O. 50 200     I.V. (mL/kg)  0 (0)     Blood       Other 700 0 400    NG/GT  180 300    IV Piggyback       Total Intake(mL/kg) 750 (11.3) 380 (5.7) 700 (10.5)    Urine (mL/kg/hr) 0 (0) 0 (0) 0 (0)    Emesis/NG output 0 (0) 0 (0)     Drains 80 (0.1) 60 (0) 50 (0.1)    Other 2700 (1.7) 0 (0) 1000 (1.7)    Stool 0 (0) 0 (0) 0 (0)    Blood  0 (0)     Total Output 2780 60 1050    Net -2030 +320 -350           Urine Occurrence 0 x 0 x 0 x    Stool Occurrence 0 x 0 x 0 x    Emesis Occurrence 0 x 0 x           Physical Exam   Constitutional: He is oriented to person, place, and time. He appears well-developed. No distress.   Temporal and distal extremity muscle wasting   HENT:   Head: Normocephalic and atraumatic.   Mouth/Throat: No oropharyngeal exudate.   dylan tube   Eyes: EOM are normal. Pupils are equal, round, and reactive to light. Scleral icterus is present.   Neck: Normal range of motion. Neck supple. No JVD present. No thyromegaly present.   Cardiovascular: Normal rate, regular rhythm, normal heart sounds and intact  distal pulses.    No murmur heard.  Pulmonary/Chest: Effort normal. No respiratory distress. He has decreased breath sounds in the right middle field, the right lower field and the left lower field. He has no wheezes. He exhibits no tenderness.   Diminished to RLL   Abdominal: Soft. Bowel sounds are normal. He exhibits ascites. He exhibits no distension. There is tenderness (mild). There is no rebound and no guarding.   Dressing to chevron.  Wd vac removed 1/12/18  Dependent edema present R flank; surrounding erythema   Musculoskeletal: Normal range of motion. He exhibits edema (2+ LE edema). He exhibits no tenderness.   Neurological: He is alert and oriented to person, place, and time. He has normal reflexes.   Skin: Skin is warm and dry. Capillary refill takes 2 to 3 seconds. He is not diaphoretic. No erythema.   jaundice   Psychiatric: He has a normal mood and affect. His behavior is normal. Judgment and thought content normal. His mood appears not anxious.   Depressed mood   Nursing note and vitals reviewed.      Laboratory:  Immunosuppressants         Stop Route Frequency     cycloSPORINE (NEORAL) 100 mg/mL microemulsion solution 175 mg      -- Oral 2 times daily        CBC:     Recent Labs  Lab 02/14/18  0733   WBC 3.43*   RBC 2.96*   HGB 9.1*   HCT 27.6*      MCV 93   MCH 30.7   MCHC 33.0     CMP:     Recent Labs  Lab 02/14/18  0733   GLU 85   CALCIUM 9.6   ALBUMIN 2.1*   PROT 5.7*      K 3.8   CO2 21*      BUN 20   CREATININE 4.4*   ALKPHOS 127   ALT <5*   AST 18   BILITOT 2.2*     Coagulation: No results for input(s): PT, INR, APTT in the last 168 hours.  Labs within the past 24 hours have been reviewed.    Diagnostic Results:  None    Assessment/Plan:     Fever    - Started on vanc/cefepime for ERCP. Now transitioned to vanc/zosyn.  - Blood cultures sent, prelim NGTD. Repeat blood cultures and chest xray today 2/10.  - thoracentesis 2/1 - fluid negative for infection.  - atelectasis  noted on ct scan - start breathing tx & CPT.  - now cmv positive - started treatment 2/5/18.  - taken to OR 2/7 for exp lap - fluid collections drained, cultures pending.  - has been afebrile >48hr, continue augmentin x 7 days per ID recs, ID signing off        Severe protein-calorie malnutrition    - Poor PO intake since transplant requiring short course of TPN during previous hospital stay.   - albumin remains decreased but appetite slowly improving per pt.   - Dietary consulted. Will need to closely monitor PO intake.   - supplements also ordered.   - SHANE with tube feeding start 1/16. Stopped 1/17/18, was not tolerating. Shane removed 1/20.  - prealbumin 7 on 1/15.  - TPN started 1/16/18.   - Pt able to eat minimally 1/21 and 1/22 which is an improvement.  - gi consulted - EGD reviewed, increased ppi bid, miralax bid, and metamucil bid, decreased narcotics.  - continue reglan, placed SHANE tube with tube feeds - transitioned to nightly feeds. Not tolerating well due to nausea.   - small bowel follow through with normal findings.   - consulted gi for possible gj tube placement  - will attempt marinol today, if unsuccessful for 2 days will place gj tube          Acute renal failure with tubular necrosis    - HD resumed on previous admission. Now anuric and dialyzes M-W-F.  - Nephrology following.    - HD Sat since held Friday for thora/para.  - ktm consulted for possible kidney transplant.        Other cytomegaloviral diseases    - detected at 370 on CMV PCR 2/1.  - case discussed with ID and will hold off on treatment at this time given low WBC.   - undetected CMV PCR 2/8.  - continue Valcyte 200 mg every Mon, Wed, Fri.        Liver transplanted    - Post op course complicated by fevers and hyperbilirubinemia.  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  - AST/ALT normal. Tbili/alkphos remain elevated.  - Liver US 1/8 showed 3.9 cm complex fluid collection anterior to right lobe and moderate  nonspecific complex ascites inferior to transplant.   - IR placed drain 1/11/18, cell count negative for infection.  - PBS consulted. PBS not comfortable proceeding with EUS with liver biopsy given fever and possible infectious process going on. ERCP 1/17 with stone and sludge. Pt placed on Actigall - treated with 10 day course of abx.  ID now signed off.  - Bili elevated - liver u/s 1/28  - Liver biopsy 1/23 without rejection.  - consulted PBS for ERCP - Choledocholithiasis was found along with sludge, stents placed 1/30.  - T bili stable Monitor.             Constipation    - Enema ordered 1/20 and 1/21 with BM.  - Encourage ambulation.  - decrease narcotics.  - d/c metamucil and miralax bid as makes patient nauseated.  - increase reglan qid.  - small bowel follow through with normal findings.   - patient reports bowel movement 2/13        Long-term use of immunosuppressant medication    - Maintenance IS with cyclosporine. MMF on hold for infections and neutropenia. Continue to check cyclosporine level daily. Assess for toxicity and adjust level as needed.        Prophylactic immunotherapy    - See long term use of immunosuppression.         Anemia of chronic disease    - Continue daily CBC.  - Previously transfused 1/30, 2/6, continue procrit.  - Given 1 U PRBC 2/6 with response in H/H.  - H&H 7/21.2. Transfuse 1 unit PRBCs.         Recurrent pleural effusion on right    - CXR in ER with large right pleural effusion with subjective c/o worsening SOB  - thoracentesis completed- 1200 ml removed.  SOB with significant improvement.   - Cell count reviewed and negative for infection.   - resp even and non labored.  O2 sat 94-98%.  - Repeat thora 1/19 negative for infection per cell count.  - Reaccumulating fluid on xray 1/21.  - thoracentesis 1/31 - 1.4L removed, negative for infection  - Reaccumulating fluid again on xray today 2/10. May need thoracentesis again        Biliary stricture of transplanted liver    -  tbili with increase prompting ERCP 1/30 with stones and sludge, stents placed.  - T bili now improving. Trend daily.         At risk for opportunistic infections    - CMV detected 2/1 now on tx dose valcyte.  - bactrim held.          Delayed surgical wound healing    - wd vac removed 1/12/18.  Wound healing well. Aquacel AG and Mepilex border dressing applied via wound care recs.           Other ascites    - Paracentesis performed 1/11/18 with 1600 ml removed.    - fluid negative for infection.   - Repeat para 1/19 negative for infection per cell count.  - no fluid seen for paracentesis on 1/23.        Nausea and vomiting    - h/o constipation, reports having regular BM's with bowel regimen.  - vomiting episode x 1 at home. Anti-emetics ordered PRN.  - KUB repeated 1/14/18 given increased abdominal pain- mild, generalized bowel distention suggesting ileus.  Diet changed to clears as tolerated for bowel rest .  - SHANE tube placed 1/16 for tube feeds. Not tolerating tube feeds, stopped 1/17/18.  - D/C shane 1/19, now in place as of 1/25.  - chronic constipation, Cont bowel regimen.   - GI consulted. See protein calorie malnutrition.  - not tolerating TFs due to nausea. Holding periodically overnight.             VTE Risk Mitigation         Ordered     heparin (porcine) injection 1,000 Units  As needed (PRN)     Route:  Intra-Catheter        02/05/18 0945     heparin (porcine) injection 5,000 Units  Every 8 hours     Route:  Subcutaneous        01/31/18 0957     Medium Risk of VTE  Once      01/11/18 0351     Place sequential compression device  Until discontinued      01/11/18 0351          The patients clinical status was discussed at multidisplinary rounds, involving transplant surgery, transplant medicine, pharmacy, nursing, nutrition, and social work    Discharge Planning: not candidate at this time  Monitor kidney function --> may need outpt HD  Monitor HH needs vs rehab for deconditioned status      Seda EL  SAMREEN Camacho  Liver Transplant  [facility]

## 2018-02-14 NOTE — SUBJECTIVE & OBJECTIVE
Past Medical History:   Diagnosis Date    Alcoholic hepatitis with ascites     Alcoholic hepatitis with ascites     History of hematemesis 9/28/2017    Hypertension     Renal disorder        Past Surgical History:   Procedure Laterality Date    APPENDECTOMY      ERCP      ESOPHAGOGASTRODUODENOSCOPY      LIVER TRANSPLANT         Review of patient's allergies indicates:   Allergen Reactions    Bactrim [sulfamethoxazole-trimethoprim] Other (See Comments)     Mookie Trell Syndrome     Family History     None        Social History Main Topics    Smoking status: Former Smoker     Packs/day: 1.00     Years: 10.00     Types: Cigarettes    Smokeless tobacco: Never Used    Alcohol use No      Comment: a fifth of liquor daily for years, cut back over last 2 months    Drug use: No    Sexual activity: Not on file     Review of Systems   Constitutional: Positive for activity change, appetite change, fatigue and fever. Negative for chills.   HENT: Negative.  Negative for congestion and facial swelling.    Eyes: Negative for pain, discharge and visual disturbance.   Respiratory: Negative for cough, chest tightness, shortness of breath and wheezing.    Cardiovascular: Negative for chest pain, palpitations and leg swelling.   Gastrointestinal: Positive for abdominal distention, abdominal pain, constipation and nausea. Negative for vomiting.   Endocrine: Negative.    Genitourinary: Positive for decreased urine volume. Negative for difficulty urinating.        Anuric   Musculoskeletal: Negative for arthralgias, back pain and myalgias.   Skin: Positive for color change and wound. Negative for rash.   Allergic/Immunologic: Positive for immunocompromised state.   Neurological: Negative for dizziness, seizures, weakness and headaches.   Psychiatric/Behavioral: Positive for decreased concentration and dysphoric mood. Negative for confusion. The patient is not nervous/anxious.    All other systems reviewed and are  negative.    Objective:     Vital Signs (Most Recent):  Temp: 98.9 °F (37.2 °C) (02/14/18 1020)  Pulse: 84 (02/14/18 1020)  Resp: 18 (02/14/18 1020)  BP: 135/86 (02/14/18 1020)  SpO2: 95 % (02/14/18 0730) Vital Signs (24h Range):  Temp:  [97.9 °F (36.6 °C)-99.5 °F (37.5 °C)] 98.9 °F (37.2 °C)  Pulse:  [] 84  Resp:  [16-18] 18  SpO2:  [94 %-96 %] 95 %  BP: (123-144)/(73-86) 135/86     Weight: 66.5 kg (146 lb 9.7 oz) (02/14/18 0615)  Body mass index is 23.66 kg/m².      Intake/Output Summary (Last 24 hours) at 02/14/18 1125  Last data filed at 02/14/18 1020   Gross per 24 hour   Intake              910 ml   Output             1080 ml   Net             -170 ml       Lines/Drains/Airways     Central Venous Catheter Line                 Hemodialysis Catheter right internal jugular -- days         Trialysis (Dialysis) Catheter right internal jugular -- days          Drain                 Trans Pyloric Feeding Tube 01/26/18 nasogastric 19 days         Closed/Suction Drain 02/07/18 1459 Right;Inferior Abdomen Bulb 15 Fr. 6 days          Peripheral Intravenous Line                 Midline Catheter Insertion/Assessment  - Single Lumen 02/06/18 0917 Left brachial vein 18g x 8cm 8 days                Physical Exam   Constitutional: He is oriented to person, place, and time. He appears well-developed. No distress.   HENT:   Head: Normocephalic and atraumatic.   Eyes: Conjunctivae are normal. Pupils are equal, round, and reactive to light.   Neck: Trachea normal. Neck supple. No JVD present.   Cardiovascular: Normal rate, regular rhythm, S1 normal, S2 normal, normal heart sounds, intact distal pulses and normal pulses.  Exam reveals no gallop and no friction rub.    No murmur heard.  Pulmonary/Chest: Effort normal and breath sounds normal.   Abdominal: Soft. Bowel sounds are normal. He exhibits distension.   Well-healed transverse abdominal scar from past liver transplant   Drain in place    Musculoskeletal: Normal range of  motion. He exhibits edema (1+ pitting edema).   Neurological: He is alert and oriented to person, place, and time.   Skin: Skin is warm and dry. Capillary refill takes less than 2 seconds.   Psychiatric: He has a normal mood and affect. His behavior is normal.   Vitals reviewed.    Significant Labs:  CBC:   Recent Labs  Lab 02/13/18  0547 02/14/18  0733   WBC 3.26* 3.43*   HGB 8.4* 9.1*   HCT 25.8* 27.6*    215     BMP:   Recent Labs  Lab 02/14/18  0733   GLU 85      K 3.8      CO2 21*   BUN 20   CREATININE 4.4*   CALCIUM 9.6   MG 1.8     CMP:   Recent Labs  Lab 02/14/18  0733   GLU 85   CALCIUM 9.6   ALBUMIN 2.1*   PROT 5.7*      K 3.8   CO2 21*      BUN 20   CREATININE 4.4*   ALKPHOS 127   ALT <5*   AST 18   BILITOT 2.2*     Coagulation: No results for input(s): PT, INR, APTT in the last 48 hours.    Significant Imaging:  Imaging results within the past 24 hours have been reviewed.

## 2018-02-14 NOTE — ASSESSMENT & PLAN NOTE
LAURA dialysisi dependant since 1016/2017  HD on [facility] under the cre of Dr. Peterson  On HD for: 2.5 mo since 10/16/2017  Duration of outpatient dialysis session - 3.5 hrs  EDW - TBD  Residual Renal Function - yes     Plan:  - Will provide dialysis for metabolic clearance and volume management in am via RIJ-PermCath, UF 1 L as tolerated by patient, maintain MAP>65, will adjust bath to chem.

## 2018-02-14 NOTE — SUBJECTIVE & OBJECTIVE
Interval History:   Patient evaluated at bedside in NINO, hemodynamically stable, no distress.     Review of patient's allergies indicates:   Allergen Reactions    Bactrim [sulfamethoxazole-trimethoprim] Other (See Comments)     Mookie Trell Syndrome     Current Facility-Administered Medications   Medication Frequency    0.9%  NaCl infusion (for blood administration) Q24H PRN    0.9%  NaCl infusion PRN    0.9%  NaCl infusion Once    0.9%  NaCl infusion PRN    0.9%  NaCl infusion Once    acetaminophen tablet 650 mg Q6H PRN    albuterol-ipratropium 2.5mg-0.5mg/3mL nebulizer solution 3 mL Q4H PRN    amoxicillin-pot clavulanate 250-62.5 mg/5ml suspension 500 mg Daily    atovaquone suspension 1,500 mg Daily    bisacodyl suppository 10 mg Daily PRN    cycloSPORINE (NEORAL) 100 mg/mL microemulsion solution 175 mg BID    docusate sodium capsule 100 mg BID    epoetin maurisio injection 10,000 Units Every Mon, Wed, Fri    heparin (porcine) injection 1,000 Units PRN    heparin (porcine) injection 5,000 Units Q8H    isavuconazonium sulfate 372 mg in sodium chloride 0.9% 250 mL Q24H    levetiracetam oral soln Soln 500 mg BID    levothyroxine tablet 75 mcg Before breakfast    metoclopramide HCl 5 mg/5 mL solution 5 mg QID (AC & HS)    omeprazole capsule 40 mg QAM    omnipaque oral solution 15 mL PRN    ondansetron disintegrating tablet 8 mg Q6H PRN    ondansetron injection 4 mg Q6H PRN    oxyCODONE immediate release tablet 5 mg Q4H PRN    polyethylene glycol packet 17 g BID    prochlorperazine injection Soln 10 mg Q6H PRN    psyllium husk (aspartame) 3.4 gram PwPk 1 packet BID    simethicone chewable tablet 80 mg TID PRN    sodium chloride 0.9% flush 3 mL PRN    sodium chloride 0.9% flush 3 mL PRN    ursodiol oral suspension (conc: 60 mg/mL) 300 mg TID    valganciclovir 50 mg/ml oral solution 200 mg Every Mon, Wed, Fri       Objective:     Vital Signs (Most Recent):  Temp: 99.4 °F (37.4 °C) (02/14/18  0815)  Pulse: 83 (02/14/18 0830)  Resp: 18 (02/14/18 0730)  BP: 126/81 (02/14/18 0830)  SpO2: 95 % (02/14/18 0730)  O2 Device (Oxygen Therapy): room air (02/14/18 0730) Vital Signs (24h Range):  Temp:  [97.9 °F (36.6 °C)-99.5 °F (37.5 °C)] 99.4 °F (37.4 °C)  Pulse:  [] 83  Resp:  [16-18] 18  SpO2:  [94 %-96 %] 95 %  BP: (123-133)/(73-84) 126/81     Weight: 66.5 kg (146 lb 9.7 oz) (02/14/18 0615)  Body mass index is 23.66 kg/m².  Body surface area is 1.76 meters squared.    I/O last 3 completed shifts:  In: 430 [P.O.:250; NG/GT:180]  Out: 60 [Drains:60]    Physical Exam   Constitutional: He is oriented to person, place, and time. He appears well-developed. No distress.   HENT:   Head: Normocephalic and atraumatic.   Eyes: Conjunctivae are normal. Pupils are equal, round, and reactive to light.   Neck: Trachea normal. Neck supple. No JVD present.   Cardiovascular: Normal rate, regular rhythm, S1 normal, S2 normal, normal heart sounds, intact distal pulses and normal pulses.  Exam reveals no gallop and no friction rub.    No murmur heard.  Pulmonary/Chest: Effort normal and breath sounds normal.   Abdominal: Soft. Bowel sounds are normal. He exhibits distension, fluid wave and ascites.   Musculoskeletal: Normal range of motion. He exhibits edema (1+ pitting edema).   Neurological: He is alert and oriented to person, place, and time.   Skin: Skin is warm and dry. Capillary refill takes less than 2 seconds.   Psychiatric: He has a normal mood and affect. His behavior is normal.   Vitals reviewed.      Significant Labs:  ABGs: No results for input(s): PH, PCO2, HCO3, POCSATURATED, BE in the last 168 hours.  BMP:   Recent Labs  Lab 02/13/18  0547   GLU 93      CO2 23   BUN 14   CREATININE 3.3*   CALCIUM 8.9   MG 1.8     CBC:   Recent Labs  Lab 02/14/18  0733   WBC 3.43*   RBC 2.96*   HGB 9.1*   HCT 27.6*      MCV 93   MCH 30.7   MCHC 33.0     CMP:   Recent Labs  Lab 02/13/18  0547   GLU 93   CALCIUM 8.9    ALBUMIN 2.1*   PROT 5.4*      K 3.7   CO2 23      BUN 14   CREATININE 3.3*   ALKPHOS 109   ALT <5*   AST 18   BILITOT 2.0*     All labs within the past 24 hours have been reviewed.

## 2018-02-14 NOTE — PROGRESS NOTES
2 hr dialysis trx tiffani well. 600 cc fluid removal. R IJ PC flushed with NS, locked with Heparin. PC capped and taped. Back to room per W/C.

## 2018-02-14 NOTE — ASSESSMENT & PLAN NOTE
"- patient with poor PO intake since transplantation   - had short course of TPN during previous hospitalization   - SHANE with tube feeding started 01/16 and stopped 01/17 due to intolerance; SHANE was removed 01/20 and placed back 01/25  - TPN started 01/16  - GI was previously consulted and increased PPI and added miralax and metamucil  - patient currently receiving feeds through KEOfeed tube; however, he does not like it - he is intermittently able to eat by mouth but not enough to have adequate nutrition in the longterm  - small bowel follow-through 02/11: "Mild small bowel dilatation with normal small bowel transit time."  - EGD most recently performed 01/25: with no abnormal findings   - ERCP on 01/30:     "One stent from the biliary tree was seen in the                         major papilla.                        - One stent was removed from the biliary tree.                        - Choledocholithiasis was found. Complete removal                         was accomplished by balloon extraction. No                         cholangiographic evidence for stricture at                         anastomosis.                        - Two plastic stents were placed into the common                         bile duct (given quick repeat occurence of                         stone/sludge since last ERCP, stents placed to                         optimize drainage should this occur again)."  - diagnostic laparoscopy (02/07):  Findings of "Turbid dark appearing fluid in pelvis, loculated area around kidney entered without significant return of fluid - sent for studies"  - GI consulted for possible GJ tube placement for feeding   - NPO at midnight with plan for GJ tube placement tomorrow 02/15  "

## 2018-02-14 NOTE — NURSING
"Pt transported to HD unit for dialysis.  Transported via wheelchair, accompanied by staff.  Appropriate meds were administered by night nurse this AM.  Will administer all other meds on return to [facility].  Prior to transport, RJP was emptied & drain sponge was changed.  Patient's mother states the drain is "leaking more around it" than previously.  They state understanding to call nurse with any further changes.  "

## 2018-02-14 NOTE — PROGRESS NOTES
[facility] [address]  Nephrology  Progress Note    Patient Name: Jhonny Diana  MRN: [id number]  Admission Date: 1/11/2018  Hospital Length of Stay: 34 days  Attending Provider: Nathanael Medina MD   Primary Care Physician: Primary Doctor No  Principal Problem:Fever    Subjective:     HPI: Jhonny Diana is a 28 y/o  male with PMHx relevant for ETOH cirrhosis.  S/p DDLT 10/19/2017; c/b seizures (swtiched off prograf to cyclo), ATN dialysis dependant in iHD MWF, last HD on 1/10, anuric, superficial wound infection s/p wound vac to chevron incision, and multiple admissions for fevers finally dx with peritonitis in November (WBC 5000, 75% PNM) neg for bile leak. HE gets recurrent paracentesis and has peritoneal drain. Other pertinent PMH current wound vac in place 2/2 wound infection, malnutrition requiring TPN for short course and ongoing hypoalbuminemia, and seizure activity while on prograf and has since been switched to cyclosporine without reoccurrence.  He is admitted to LTx service secondary to fever, abdominal pain, and N/V. He reports fever (103) for 1 day prior. Overnight, he developed N/V, reports small amount of green emesis with new left sided pain. He also endorses worsening SOB with exertion. He was scheduled as an outpatient for follow up paracentesis and IR drainage of fluid collection. CXR in ER shows large pleural effusion with subsegmental atelectasis. His ANC is 900. Broad spectrum antibiotics initiated in ED. Nephrology consulted for LAURA dialysis dependant co management. He dialyses at [facility] under the care of Dr. Peterson via [facility] rowe.      Interval History:   Patient evaluated at bedside in NINO, hemodynamically stable, no distress.     Review of patient's allergies indicates:   Allergen Reactions    Bactrim [sulfamethoxazole-trimethoprim] Other (See Comments)     Mookie Trell Syndrome     Current Facility-Administered Medications   Medication Frequency    0.9%  NaCl infusion  (for blood administration) Q24H PRN    0.9%  NaCl infusion PRN    0.9%  NaCl infusion Once    0.9%  NaCl infusion PRN    0.9%  NaCl infusion Once    acetaminophen tablet 650 mg Q6H PRN    albuterol-ipratropium 2.5mg-0.5mg/3mL nebulizer solution 3 mL Q4H PRN    amoxicillin-pot clavulanate 250-62.5 mg/5ml suspension 500 mg Daily    atovaquone suspension 1,500 mg Daily    bisacodyl suppository 10 mg Daily PRN    cycloSPORINE (NEORAL) 100 mg/mL microemulsion solution 175 mg BID    docusate sodium capsule 100 mg BID    epoetin maurisio injection 10,000 Units Every Mon, Wed, Fri    heparin (porcine) injection 1,000 Units PRN    heparin (porcine) injection 5,000 Units Q8H    isavuconazonium sulfate 372 mg in sodium chloride 0.9% 250 mL Q24H    levetiracetam oral soln Soln 500 mg BID    levothyroxine tablet 75 mcg Before breakfast    metoclopramide HCl 5 mg/5 mL solution 5 mg QID (AC & HS)    omeprazole capsule 40 mg QAM    omnipaque oral solution 15 mL PRN    ondansetron disintegrating tablet 8 mg Q6H PRN    ondansetron injection 4 mg Q6H PRN    oxyCODONE immediate release tablet 5 mg Q4H PRN    polyethylene glycol packet 17 g BID    prochlorperazine injection Soln 10 mg Q6H PRN    psyllium husk (aspartame) 3.4 gram PwPk 1 packet BID    simethicone chewable tablet 80 mg TID PRN    sodium chloride 0.9% flush 3 mL PRN    sodium chloride 0.9% flush 3 mL PRN    ursodiol oral suspension (conc: 60 mg/mL) 300 mg TID    valganciclovir 50 mg/ml oral solution 200 mg Every Mon, Wed, Fri       Objective:     Vital Signs (Most Recent):  Temp: 99.4 °F (37.4 °C) (02/14/18 0815)  Pulse: 83 (02/14/18 0830)  Resp: 18 (02/14/18 0730)  BP: 126/81 (02/14/18 0830)  SpO2: 95 % (02/14/18 0730)  O2 Device (Oxygen Therapy): room air (02/14/18 0730) Vital Signs (24h Range):  Temp:  [97.9 °F (36.6 °C)-99.5 °F (37.5 °C)] 99.4 °F (37.4 °C)  Pulse:  [] 83  Resp:  [16-18] 18  SpO2:  [94 %-96 %] 95 %  BP: (123-133)/(73-84)  126/81     Weight: 66.5 kg (146 lb 9.7 oz) (02/14/18 0615)  Body mass index is 23.66 kg/m².  Body surface area is 1.76 meters squared.    I/O last 3 completed shifts:  In: 430 [P.O.:250; NG/GT:180]  Out: 60 [Drains:60]    Physical Exam   Constitutional: He is oriented to person, place, and time. He appears well-developed. No distress.   HENT:   Head: Normocephalic and atraumatic.   Eyes: Conjunctivae are normal. Pupils are equal, round, and reactive to light.   Neck: Trachea normal. Neck supple. No JVD present.   Cardiovascular: Normal rate, regular rhythm, S1 normal, S2 normal, normal heart sounds, intact distal pulses and normal pulses.  Exam reveals no gallop and no friction rub.    No murmur heard.  Pulmonary/Chest: Effort normal and breath sounds normal.   Abdominal: Soft. Bowel sounds are normal. He exhibits distension, fluid wave and ascites.   Musculoskeletal: Normal range of motion. He exhibits edema (1+ pitting edema).   Neurological: He is alert and oriented to person, place, and time.   Skin: Skin is warm and dry. Capillary refill takes less than 2 seconds.   Psychiatric: He has a normal mood and affect. His behavior is normal.   Vitals reviewed.      Significant Labs:  ABGs: No results for input(s): PH, PCO2, HCO3, POCSATURATED, BE in the last 168 hours.  BMP:   Recent Labs  Lab 02/13/18  0547   GLU 93      CO2 23   BUN 14   CREATININE 3.3*   CALCIUM 8.9   MG 1.8     CBC:   Recent Labs  Lab 02/14/18  0733   WBC 3.43*   RBC 2.96*   HGB 9.1*   HCT 27.6*      MCV 93   MCH 30.7   MCHC 33.0     CMP:   Recent Labs  Lab 02/13/18  0547   GLU 93   CALCIUM 8.9   ALBUMIN 2.1*   PROT 5.4*      K 3.7   CO2 23      BUN 14   CREATININE 3.3*   ALKPHOS 109   ALT <5*   AST 18   BILITOT 2.0*     All labs within the past 24 hours have been reviewed.     Assessment/Plan:     Acute renal failure with tubular necrosis    LAURA dialysisi dependant since 1016/2017  HD on [facility] under the cre of  Dr. Peterson  On HD for: 2.5 mo since 10/16/2017  Duration of outpatient dialysis session - 3.5 hrs  EDW - TBD  Residual Renal Function - yes     Plan:  - Will provide dialysis for metabolic clearance and volume management in am via RIJ-PermCath, UF 1 L as tolerated by patient, maintain MAP>65, will adjust bath to chem.             Win Sandoval  Nephrology  Fellow  [facility]    Pager [phone]   I have reviewed and concur with the fellow's history, physical, assessment, and plan. I have personally interviewed and examined the patient at bedside

## 2018-02-15 LAB
ALBUMIN SERPL BCP-MCNC: 1.9 G/DL
ALP SERPL-CCNC: 118 U/L
ALT SERPL W/O P-5'-P-CCNC: <5 U/L
ANION GAP SERPL CALC-SCNC: 9 MMOL/L
AST SERPL-CCNC: 17 U/L
BASOPHILS # BLD AUTO: 0.03 K/UL
BASOPHILS NFR BLD: 1.1 %
BILIRUB SERPL-MCNC: 2.1 MG/DL
BUN SERPL-MCNC: 16 MG/DL
CALCIUM SERPL-MCNC: 9.2 MG/DL
CHLORIDE SERPL-SCNC: 102 MMOL/L
CO2 SERPL-SCNC: 25 MMOL/L
CREAT SERPL-MCNC: 4.1 MG/DL
CYCLOSPORINE BLD LC/MS/MS-MCNC: 201 NG/ML
DIFFERENTIAL METHOD: ABNORMAL
EOSINOPHIL # BLD AUTO: 0.2 K/UL
EOSINOPHIL NFR BLD: 5.8 %
ERYTHROCYTE [DISTWIDTH] IN BLOOD BY AUTOMATED COUNT: 14.2 %
EST. GFR  (AFRICAN AMERICAN): 21.4 ML/MIN/1.73 M^2
EST. GFR  (NON AFRICAN AMERICAN): 18.5 ML/MIN/1.73 M^2
GLUCOSE SERPL-MCNC: 87 MG/DL
HCT VFR BLD AUTO: 25.2 %
HGB BLD-MCNC: 8.2 G/DL
IMM GRANULOCYTES # BLD AUTO: 0.04 K/UL
IMM GRANULOCYTES NFR BLD AUTO: 1.4 %
LYMPHOCYTES # BLD AUTO: 0.8 K/UL
LYMPHOCYTES NFR BLD: 28.9 %
MAGNESIUM SERPL-MCNC: 1.7 MG/DL
MCH RBC QN AUTO: 30.7 PG
MCHC RBC AUTO-ENTMCNC: 32.5 G/DL
MCV RBC AUTO: 94 FL
MONOCYTES # BLD AUTO: 0.3 K/UL
MONOCYTES NFR BLD: 11.6 %
NEUTROPHILS # BLD AUTO: 1.4 K/UL
NEUTROPHILS NFR BLD: 51.2 %
NRBC BLD-RTO: 0 /100 WBC
PHOSPHATE SERPL-MCNC: 3.2 MG/DL
PLATELET # BLD AUTO: 171 K/UL
PMV BLD AUTO: 9.9 FL
POTASSIUM SERPL-SCNC: 3.8 MMOL/L
PROT SERPL-MCNC: 5.3 G/DL
RBC # BLD AUTO: 2.67 M/UL
SODIUM SERPL-SCNC: 136 MMOL/L
WBC # BLD AUTO: 2.77 K/UL

## 2018-02-15 PROCEDURE — 63600175 PHARM REV CODE 636 W HCPCS: Performed by: NURSE PRACTITIONER

## 2018-02-15 PROCEDURE — 80053 COMPREHEN METABOLIC PANEL: CPT

## 2018-02-15 PROCEDURE — 20600001 HC STEP DOWN PRIVATE ROOM

## 2018-02-15 PROCEDURE — 85025 COMPLETE CBC W/AUTO DIFF WBC: CPT

## 2018-02-15 PROCEDURE — 25000003 PHARM REV CODE 250: Performed by: SURGERY

## 2018-02-15 PROCEDURE — 25000003 PHARM REV CODE 250: Performed by: NURSE PRACTITIONER

## 2018-02-15 PROCEDURE — 80158 DRUG ASSAY CYCLOSPORINE: CPT

## 2018-02-15 PROCEDURE — 84100 ASSAY OF PHOSPHORUS: CPT

## 2018-02-15 PROCEDURE — 63600175 PHARM REV CODE 636 W HCPCS: Mod: JG | Performed by: SURGERY

## 2018-02-15 PROCEDURE — 99900035 HC TECH TIME PER 15 MIN (STAT)

## 2018-02-15 PROCEDURE — 87040 BLOOD CULTURE FOR BACTERIA: CPT

## 2018-02-15 PROCEDURE — 83735 ASSAY OF MAGNESIUM: CPT

## 2018-02-15 PROCEDURE — 99233 SBSQ HOSP IP/OBS HIGH 50: CPT | Mod: 24,,, | Performed by: PHYSICIAN ASSISTANT

## 2018-02-15 PROCEDURE — 36415 COLL VENOUS BLD VENIPUNCTURE: CPT

## 2018-02-15 PROCEDURE — 25000003 PHARM REV CODE 250: Performed by: PHYSICIAN ASSISTANT

## 2018-02-15 RX ORDER — LIDOCAINE HYDROCHLORIDE 10 MG/ML
10 INJECTION INFILTRATION; PERINEURAL ONCE
Status: COMPLETED | OUTPATIENT
Start: 2018-02-15 | End: 2018-02-15

## 2018-02-15 RX ADMIN — SODIUM CHLORIDE: 9 INJECTION, SOLUTION INTRAVENOUS at 12:02

## 2018-02-15 RX ADMIN — ACETAMINOPHEN 650 MG: 325 TABLET, FILM COATED ORAL at 11:02

## 2018-02-15 RX ADMIN — ATOVAQUONE 1500 MG: 750 SUSPENSION ORAL at 09:02

## 2018-02-15 RX ADMIN — CYCLOSPORINE 175 MG: 100 SOLUTION ORAL at 09:02

## 2018-02-15 RX ADMIN — OMEPRAZOLE 40 MG: 40 CAPSULE, DELAYED RELEASE ORAL at 06:02

## 2018-02-15 RX ADMIN — URSODIOL 300 MG: 300 CAPSULE ORAL at 09:02

## 2018-02-15 RX ADMIN — METOCLOPRAMIDE HYDROCHLORIDE 5 MG: 5 SOLUTION ORAL at 04:02

## 2018-02-15 RX ADMIN — OXYCODONE HYDROCHLORIDE 5 MG: 5 TABLET ORAL at 06:02

## 2018-02-15 RX ADMIN — DRONABINOL 2.5 MG: 2.5 CAPSULE ORAL at 04:02

## 2018-02-15 RX ADMIN — LEVOTHYROXINE SODIUM 75 MCG: 75 TABLET ORAL at 06:02

## 2018-02-15 RX ADMIN — LIDOCAINE HYDROCHLORIDE 10 ML: 10 INJECTION, SOLUTION INFILTRATION; PERINEURAL at 04:02

## 2018-02-15 RX ADMIN — OXYCODONE HYDROCHLORIDE 5 MG: 5 TABLET ORAL at 09:02

## 2018-02-15 RX ADMIN — AMOXICILLIN AND CLAVULANATE POTASSIUM 500 MG: 250; 62.5 POWDER, FOR SUSPENSION ORAL at 09:02

## 2018-02-15 RX ADMIN — ONDANSETRON HYDROCHLORIDE 4 MG: 2 INJECTION, SOLUTION INTRAMUSCULAR; INTRAVENOUS at 06:02

## 2018-02-15 RX ADMIN — LEVETIRACETAM 500 MG: 100 SOLUTION ORAL at 09:02

## 2018-02-15 RX ADMIN — DRONABINOL 2.5 MG: 2.5 CAPSULE ORAL at 06:02

## 2018-02-15 RX ADMIN — CYCLOSPORINE 175 MG: 100 SOLUTION ORAL at 06:02

## 2018-02-15 RX ADMIN — ONDANSETRON HYDROCHLORIDE 4 MG: 2 INJECTION, SOLUTION INTRAMUSCULAR; INTRAVENOUS at 02:02

## 2018-02-15 RX ADMIN — METOCLOPRAMIDE HYDROCHLORIDE 5 MG: 5 SOLUTION ORAL at 09:02

## 2018-02-15 RX ADMIN — METOCLOPRAMIDE HYDROCHLORIDE 5 MG: 5 SOLUTION ORAL at 06:02

## 2018-02-15 RX ADMIN — OXYCODONE HYDROCHLORIDE 5 MG: 5 TABLET ORAL at 02:02

## 2018-02-15 RX ADMIN — METOCLOPRAMIDE HYDROCHLORIDE 5 MG: 5 SOLUTION ORAL at 12:02

## 2018-02-15 RX ADMIN — URSODIOL 300 MG: 300 CAPSULE ORAL at 06:02

## 2018-02-15 RX ADMIN — URSODIOL 300 MG: 300 CAPSULE ORAL at 01:02

## 2018-02-15 NOTE — ASSESSMENT & PLAN NOTE
- Post op course complicated by fevers and hyperbilirubinemia.  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  - AST/ALT normal. Tbili/alkphos remain elevated.  - Liver US 1/8 showed 3.9 cm complex fluid collection anterior to right lobe and moderate nonspecific complex ascites inferior to transplant.   - IR placed drain 1/11/18, cell count negative for infection.  - PBS consulted. PBS not comfortable proceeding with EUS with liver biopsy given fever and possible infectious process going on. ERCP 1/17 with stone and sludge. Pt placed on Actigall - treated with 10 day course of abx.  ID now signed off.  - Bili elevated - liver u/s 1/28  - Liver biopsy 1/23 without rejection.  - consulted PBS for ERCP - Choledocholithiasis was found along with sludge, stents placed 1/30.  - T bili remains elevated but stable.   -If spikes another temp will consider PBS consult for repeat ERCP. Monitor.

## 2018-02-15 NOTE — PLAN OF CARE
Problem: Patient Care Overview  Goal: Plan of Care Review  Outcome: Ongoing (interventions implemented as appropriate)  Pt AAO X 4, mother at the bedside. VSS, see flowsheet for further assessment data.    HD performed on 2/14, 600mls removed.    Infection control in progress; Cresemba administered as ordered, standard precautions maintained. Tmax 99.6.    I/Os monitored. PO intake encouraged; Marinol given. Tube feeds administered overnight at 40mls/hr, goal is 55mls/hr; R nare Alex tube remains patent. GI to possibly place GJ tube on Friday/Monday.  No UO overnight. J/P drain output 50mls, serosanguinous.    Pt up with standby assist; no new skin breakdown noted. Chevron dressing remains CDI.    Fall precautions in place; pt remains free of injury at this time. Daily labs monitored. No acute events overnight.

## 2018-02-15 NOTE — PROGRESS NOTES
[facility]  Liver Transplant  Progress Note    Patient Name: Jhonny Diana  MRN: [id number]  Admission Date: 2018  Hospital Length of Stay: 35 days  Code Status: Full Code  Primary Care Provider: Primary Doctor No  Post-Operative Day: 119    ORGAN:   LIVER  Disease Etiology: Acute Alcoholic Hepatitis  Donor Type:    - Brain Death  CDC High Risk:   No  Donor CMV Status:   Donor CMV Status: Positive  Donor HBcAB:   Negative  Donor HCV Status:   Negative  Whole or Partial: Whole Liver  Biliary Anastomosis: End to End  Arterial Anatomy: Standard  Subjective:     History of Present Illness:  Jhonny Diana is a 26 y/o male with past medical history of alcoholic cirrhosis.  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 Found to have peritonitis in November (WBC 5000, 75% PNM) neg for bile leak. He was treated initially with vanc/cefepime. Repeat cell counts  with some improvement (WBC 1400, 45% PNM). He has undergone multiple paracenteses as well as abscess drainage of perihepatic fluid collections and treated with antimicrobial therapy but no positive cultures. Of note, biliary stricture also identified and ERCP performed on 2017 with sphincterotomy and biliary stent placed. Liver tests still have not normalized despite intervention, bilirubin and AP remain elevated. Other pertinent PMH current wound vac in place 2/2 wound infection, malnutrition requiring TPN for short course and ongoing hypoalbuminemia, and seizure activity while on prograf and has since been switched to cyclosporine without reoccurrence.  He presented to the ER for fever, abdominal pain, and N/V. He reports fever (103) for 1  day prior. Overnight, he developed N/V, reports small amount of green emesis with new left sided pain. He also endorses worsening SOB with exertion. He was scheduled as an outpatient for follow up paracentesis and IR drainage of fluid collection. CXR in ER shows large pleural effusion with subsegmental atelectasis. Infectious work up initiated in ER. His ANC is 900. Broad spectrum antibiotics initiated in ED. He denies chest pain, palpitations, diarrhea, constipation, or back pain. Denies any sick contacts.    Hospital Course:  Thoracentesis (1.2L off), Paracentesis (1.6L off), and IR drainage of fluid collection 1/11, all fluids negative for infection. ID consulted. Broad spectrum antibiotics d/c'd 1/15. Chronically malnourished with poor PO intake, prealbumin 7. Shane tube placed 1/15 for tube feedings.     ERCP 1/17 with sludge and a biliary stone which was removed and stent exchanged.  Remained with n/v; therefore, TF remained on hold and TPN continued.   Pt with fever s/p ERCP on 1/17 which continued until 1/19.  Vanc/cefepime restarted.  Blood cx 1/17 and 1/18 NGTD.  ID reconsulted.  Fungal markers sent.  CT C/A/P obtained.  With large R pleural effusion and ascites- both drained 1/19 and negative for infx per cell count. Pt afebrile 1/20. Liver biopsy 1/23 - without rejection.  EGD 1/25 - unremarkable for source of GI symptoms.  Resume diet along with other GI recommendations. SHANE tube placed for tube feeds.       Interval History:  Patient more sleepy today as compared to yesterday per caregiver. Patient reports feeling okay although did have 1 episode of vomiting this AM. Tolerated po intake yesterday with increased appetite after starting marinol. Patient with low grade fever today, 100.3. Plan for CT A/P today, repeat blood cx, chest x-ray. Keep on augmentin for now but will transition back to IV broad spectrum antibiotics if spikes another temperature or if clinically appears worse. Will also consider PBS  consult for repeat ERCP if spikes another temperature. Hold off on gj tube placement until potentially Monday. Appreciate GI assistance.  Monitor.     Scheduled Meds:   sodium chloride 0.9%   Intravenous Once    sodium chloride 0.9%   Intravenous Once    amoxicillin-pot clavulanate 250-62.5 mg/5ml  500 mg Oral Daily    atovaquone  1,500 mg Oral Daily    cycloSPORINE  175 mg Oral BID    docusate sodium  100 mg Oral BID    dronabinol  2.5 mg Oral BID AC    epoetin maurisio (PROCRIT) injection  10,000 Units Intravenous Every Mon, Wed, Fri    heparin (porcine)  5,000 Units Subcutaneous Q8H    custom IVPB builder   Intravenous Q24H    levetiracetam oral soln  500 mg Oral BID    levothyroxine  75 mcg Oral Before breakfast    metoclopramide HCl  5 mg Oral QID (AC & HS)    omeprazole  40 mg Oral QAM    polyethylene glycol  17 g Oral BID    psyllium husk (aspartame)  3.4 g Oral BID    ursodiol  300 mg Oral TID    valganciclovir 50 mg/ml  200 mg Oral Every Mon, Wed, Fri     Continuous Infusions:  PRN Meds:sodium chloride, sodium chloride 0.9%, sodium chloride 0.9%, acetaminophen, albuterol-ipratropium 2.5mg-0.5mg/3mL, bisacodyl, heparin (porcine), omnipaque, ondansetron, ondansetron, oxyCODONE, prochlorperazine, simethicone, sodium chloride 0.9%, sodium chloride 0.9%    Review of Systems   Constitutional: Positive for activity change, appetite change, fatigue and fever. Negative for chills.   HENT: Negative.  Negative for congestion and facial swelling.    Eyes: Negative for pain, discharge and visual disturbance.   Respiratory: Negative for cough, chest tightness, shortness of breath and wheezing.    Cardiovascular: Negative for chest pain, palpitations and leg swelling.   Gastrointestinal: Positive for abdominal distention, abdominal pain and nausea. Negative for constipation and vomiting.   Endocrine: Negative.    Genitourinary: Positive for decreased urine volume. Negative for difficulty urinating.         Anuric   Musculoskeletal: Negative for arthralgias, back pain and myalgias.   Skin: Positive for color change and wound. Negative for rash.   Allergic/Immunologic: Positive for immunocompromised state.   Neurological: Negative for dizziness, seizures, weakness and headaches.   Psychiatric/Behavioral: Positive for decreased concentration and dysphoric mood. Negative for confusion. The patient is not nervous/anxious.    All other systems reviewed and are negative.    Objective:     Vital Signs (Most Recent):  Temp: 100.3 °F (37.9 °C) (02/15/18 1118)  Pulse: 93 (02/15/18 1118)  Resp: 16 (02/15/18 1118)  BP: 127/69 (02/15/18 1118)  SpO2: (!) 93 % (02/15/18 1118) Vital Signs (24h Range):  Temp:  [98.4 °F (36.9 °C)-100.3 °F (37.9 °C)] 100.3 °F (37.9 °C)  Pulse:  [84-93] 93  Resp:  [16-19] 16  SpO2:  [93 %-97 %] 93 %  BP: (119-135)/(69-83) 127/69     Weight: 66.5 kg (146 lb 9.7 oz)  Body mass index is 23.66 kg/m².    Intake/Output - Last 3 Shifts       02/13 0700 - 02/14 0659 02/14 0700 - 02/15 0659 02/15 0700 - 02/16 0659    P.O. 200 1180     I.V. (mL/kg) 0 (0)      Other 0 400     NG/ 620     Total Intake(mL/kg) 380 (5.7) 2200 (33.1)     Urine (mL/kg/hr) 0 (0) 0 (0)     Emesis/NG output 0 (0) 0 (0)     Drains 60 (0) 120 (0.1)     Other 0 (0) 1000 (0.6)     Stool 0 (0) 0 (0)     Blood 0 (0)      Total Output 60 1120      Net +320 +1080             Urine Occurrence 0 x 0 x     Stool Occurrence 1 x 2 x     Emesis Occurrence 0 x 1 x           Physical Exam   Constitutional: He is oriented to person, place, and time. He appears well-developed. No distress.   Temporal and distal extremity muscle wasting   HENT:   Head: Normocephalic and atraumatic.   Mouth/Throat: No oropharyngeal exudate.   dylan tube   Eyes: EOM are normal. Pupils are equal, round, and reactive to light. Scleral icterus is present.   Neck: Normal range of motion. Neck supple. No JVD present. No thyromegaly present.   Cardiovascular: Normal rate, regular  rhythm, normal heart sounds and intact distal pulses.    No murmur heard.  Pulmonary/Chest: Effort normal. No respiratory distress. He has decreased breath sounds in the right middle field, the right lower field and the left lower field. He has no wheezes. He exhibits no tenderness.   Diminished to RLL   Abdominal: Soft. Bowel sounds are normal. He exhibits ascites. He exhibits no distension. There is tenderness (mild). There is no rebound and no guarding.   Dressing to chevron.  Wd vac removed 1/12/18  Dependent edema present R flank; surrounding erythema   Musculoskeletal: Normal range of motion. He exhibits edema (2+ LE edema). He exhibits no tenderness.   Neurological: He is alert and oriented to person, place, and time. He has normal reflexes.   Skin: Skin is warm and dry. Capillary refill takes 2 to 3 seconds. He is not diaphoretic. No erythema.   jaundice   Psychiatric: He has a normal mood and affect. His behavior is normal. Judgment and thought content normal. His mood appears not anxious.   Depressed mood   Nursing note and vitals reviewed.      Laboratory:  Immunosuppressants         Stop Route Frequency     cycloSPORINE (NEORAL) 100 mg/mL microemulsion solution 175 mg      -- Oral 2 times daily        CBC:     Recent Labs  Lab 02/15/18  0635   WBC 2.77*   RBC 2.67*   HGB 8.2*   HCT 25.2*      MCV 94   MCH 30.7   MCHC 32.5     CMP:     Recent Labs  Lab 02/15/18  0635   GLU 87   CALCIUM 9.2   ALBUMIN 1.9*   PROT 5.3*      K 3.8   CO2 25      BUN 16   CREATININE 4.1*   ALKPHOS 118   ALT <5*   AST 17   BILITOT 2.1*     Coagulation: No results for input(s): PT, INR, APTT in the last 168 hours.  Labs within the past 24 hours have been reviewed.    Diagnostic Results:  None    Assessment/Plan:     Other cytomegaloviral diseases    - detected at 370 on CMV PCR 2/1.  - case discussed with ID and will hold off on treatment at this time given low WBC.   - undetected CMV PCR 2/8.  - continue  Valcyte 200 mg every Mon, Wed, Fri.  -CMV PCR 2/15 pending        Fever    - Started on vanc/cefepime for ERCP. Now transitioned to vanc/zosyn.  - Blood cultures sent, prelim NGTD. Repeat blood cultures and chest xray today 2/10.  - thoracentesis 2/1 - fluid negative for infection.  - atelectasis noted on ct scan - start breathing tx & CPT.  - now cmv positive - started treatment 2/5/18.  - taken to OR 2/7 for exp lap - fluid collections drained, cultures pending.  - Patient with low grade fever today, 100.3. Plan for CT A/P today, repeat blood cx, chest x-ray. Keep on augmentin for now but will transition back to IV broad spectrum antibiotics if spikes another temperature or if clinically appears worse. Will also consider PBS consult for repeat ERCP if spikes another temperature. Will also re consult ID if spikes another fever.           Constipation    - Enema ordered 1/20 and 1/21 with BM.  - Encourage ambulation.  - decrease narcotics.  - d/c metamucil and miralax bid as makes patient nauseated.  - increase reglan qid.  - small bowel follow through with normal findings.   - patient reports bowel movement 2/13        Other ascites    - Paracentesis performed 1/11/18 with 1600 ml removed.    - fluid negative for infection.   - Repeat para 1/19 negative for infection per cell count.  - no fluid seen for paracentesis on 1/23.        Severe protein-calorie malnutrition    - Poor PO intake since transplant requiring short course of TPN during previous hospital stay.   - albumin remains decreased but appetite slowly improving per pt.   - Dietary consulted. Will need to closely monitor PO intake.   - supplements also ordered.   - SHANE with tube feeding start 1/16. Stopped 1/17/18, was not tolerating. Shane removed 1/20.  - prealbumin 7 on 1/15.  - TPN started 1/16/18.   - Pt able to eat minimally 1/21 and 1/22 which is an improvement.  - gi consulted - EGD reviewed, increased ppi bid, miralax bid, and metamucil bid,  decreased narcotics.  - continue reglan, placed SHANE tube with tube feeds - transitioned to nightly feeds. Not tolerating well due to nausea.   - small bowel follow through with normal findings.   - consulted gi for possible gj tube placement  - Marinol started with some improvement in appetite seen.   -Will monitor through the weekend with possible plan for gj tube on Monday.           Nausea and vomiting    - h/o constipation, reports having regular BM's with bowel regimen.  - vomiting episode x 1 at home. Anti-emetics ordered PRN.  - KUB repeated 1/14/18 given increased abdominal pain- mild, generalized bowel distention suggesting ileus.  Diet changed to clears as tolerated for bowel rest .  - SHANE tube placed 1/16 for tube feeds. Not tolerating tube feeds, stopped 1/17/18.  - D/C shane 1/19, now in place as of 1/25.  - chronic constipation, Cont bowel regimen.   - GI consulted. See protein calorie malnutrition.  - not tolerating TFs due to nausea. Holding periodically overnight.         Recurrent pleural effusion on right    - CXR in ER with large right pleural effusion with subjective c/o worsening SOB  - thoracentesis completed- 1200 ml removed.  SOB with significant improvement.   - Cell count reviewed and negative for infection.   - resp even and non labored.  O2 sat 94-98%.  - Repeat thora 1/19 negative for infection per cell count.  - Reaccumulating fluid on xray 1/21.  - thoracentesis 1/31 - 1.4L removed, negative for infection  - Reaccumulating fluid again on xray 2/10.   -Repeat chest x-ray today.  -May need thoracentesis again        Biliary stricture of transplanted liver    - tbili with increase prompting ERCP 1/30 with stones and sludge, stents placed.  - T bili remains elevated but stable. Will consult PBS for repeat ERCP if spikes another fever. Trend daily.         Anemia of chronic disease    - H/H stable. Cont to monitor with daily cbc.         Delayed surgical wound healing    - wd vac removed  1/12/18.  Wound healing well. Aquacel AG and Mepilex border dressing applied via wound care recs.           At risk for opportunistic infections    - CMV detected 2/1 now on tx dose valcyte.  - bactrim held.  -CMV PCR 2/15 pending.           Long-term use of immunosuppressant medication    - Maintenance IS with cyclosporine. MMF on hold for infections and neutropenia. Continue to check cyclosporine level daily. Assess for toxicity and adjust level as needed.        Prophylactic immunotherapy    - See long term use of immunosuppression.         Liver transplanted    - Post op course complicated by fevers and hyperbilirubinemia.  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  - AST/ALT normal. Tbili/alkphos remain elevated.  - Liver US 1/8 showed 3.9 cm complex fluid collection anterior to right lobe and moderate nonspecific complex ascites inferior to transplant.   - IR placed drain 1/11/18, cell count negative for infection.  - PBS consulted. PBS not comfortable proceeding with EUS with liver biopsy given fever and possible infectious process going on. ERCP 1/17 with stone and sludge. Pt placed on Actigall - treated with 10 day course of abx.  ID now signed off.  - Bili elevated - liver u/s 1/28  - Liver biopsy 1/23 without rejection.  - consulted PBS for ERCP - Choledocholithiasis was found along with sludge, stents placed 1/30.  - T bili remains elevated but stable.   -If spikes another temp will consider PBS consult for repeat ERCP. Monitor.             Acute renal failure with tubular necrosis    - HD resumed on previous admission. Now anuric and dialyzes M-W-F.  - Nephrology following.    - ktm consulted for possible kidney transplant.            VTE Risk Mitigation         Ordered     heparin (porcine) injection 1,000 Units  As needed (PRN)     Route:  Intra-Catheter        02/05/18 0925     heparin (porcine) injection 5,000 Units  Every 8 hours     Route:  Subcutaneous        01/31/18 0953      Medium Risk of VTE  Once      01/11/18 0351     Place sequential compression device  Until discontinued      01/11/18 0351          The patients clinical status was discussed at multidisplinary rounds, involving transplant surgery, transplant medicine, pharmacy, nursing, nutrition, and social work    Discharge Planning: Not a candidate for discharge at this time.       ANA CRISTINA BusbyC  Liver Transplant  [facility]

## 2018-02-15 NOTE — ASSESSMENT & PLAN NOTE
- Poor PO intake since transplant requiring short course of TPN during previous hospital stay.   - albumin remains decreased but appetite slowly improving per pt.   - Dietary consulted. Will need to closely monitor PO intake.   - supplements also ordered.   - SHANE with tube feeding start 1/16. Stopped 1/17/18, was not tolerating. Shane removed 1/20.  - prealbumin 7 on 1/15.  - TPN started 1/16/18.   - Pt able to eat minimally 1/21 and 1/22 which is an improvement.  - gi consulted - EGD reviewed, increased ppi bid, miralax bid, and metamucil bid, decreased narcotics.  - continue reglan, placed SHANE tube with tube feeds - transitioned to nightly feeds. Not tolerating well due to nausea.   - small bowel follow through with normal findings.   - consulted gi for possible gj tube placement  - Marinol started with some improvement in appetite seen.   -Will monitor through the weekend with possible plan for gj tube on Monday.

## 2018-02-15 NOTE — SUBJECTIVE & OBJECTIVE
Scheduled Meds:   sodium chloride 0.9%   Intravenous Once    sodium chloride 0.9%   Intravenous Once    amoxicillin-pot clavulanate 250-62.5 mg/5ml  500 mg Oral Daily    atovaquone  1,500 mg Oral Daily    cycloSPORINE  175 mg Oral BID    docusate sodium  100 mg Oral BID    dronabinol  2.5 mg Oral BID AC    epoetin maurisio (PROCRIT) injection  10,000 Units Intravenous Every Mon, Wed, Fri    heparin (porcine)  5,000 Units Subcutaneous Q8H    custom IVPB builder   Intravenous Q24H    levetiracetam oral soln  500 mg Oral BID    levothyroxine  75 mcg Oral Before breakfast    metoclopramide HCl  5 mg Oral QID (AC & HS)    omeprazole  40 mg Oral QAM    polyethylene glycol  17 g Oral BID    psyllium husk (aspartame)  3.4 g Oral BID    ursodiol  300 mg Oral TID    valganciclovir 50 mg/ml  200 mg Oral Every Mon, Wed, Fri     Continuous Infusions:  PRN Meds:sodium chloride, sodium chloride 0.9%, sodium chloride 0.9%, acetaminophen, albuterol-ipratropium 2.5mg-0.5mg/3mL, bisacodyl, heparin (porcine), omnipaque, ondansetron, ondansetron, oxyCODONE, prochlorperazine, simethicone, sodium chloride 0.9%, sodium chloride 0.9%    Review of Systems   Constitutional: Positive for activity change, appetite change, fatigue and fever. Negative for chills.   HENT: Negative.  Negative for congestion and facial swelling.    Eyes: Negative for pain, discharge and visual disturbance.   Respiratory: Negative for cough, chest tightness, shortness of breath and wheezing.    Cardiovascular: Negative for chest pain, palpitations and leg swelling.   Gastrointestinal: Positive for abdominal distention, abdominal pain and nausea. Negative for constipation and vomiting.   Endocrine: Negative.    Genitourinary: Positive for decreased urine volume. Negative for difficulty urinating.        Anuric   Musculoskeletal: Negative for arthralgias, back pain and myalgias.   Skin: Positive for color change and wound. Negative for rash.    Allergic/Immunologic: Positive for immunocompromised state.   Neurological: Negative for dizziness, seizures, weakness and headaches.   Psychiatric/Behavioral: Positive for decreased concentration and dysphoric mood. Negative for confusion. The patient is not nervous/anxious.    All other systems reviewed and are negative.    Objective:     Vital Signs (Most Recent):  Temp: 100.3 °F (37.9 °C) (02/15/18 1118)  Pulse: 93 (02/15/18 1118)  Resp: 16 (02/15/18 1118)  BP: 127/69 (02/15/18 1118)  SpO2: (!) 93 % (02/15/18 1118) Vital Signs (24h Range):  Temp:  [98.4 °F (36.9 °C)-100.3 °F (37.9 °C)] 100.3 °F (37.9 °C)  Pulse:  [84-93] 93  Resp:  [16-19] 16  SpO2:  [93 %-97 %] 93 %  BP: (119-135)/(69-83) 127/69     Weight: 66.5 kg (146 lb 9.7 oz)  Body mass index is 23.66 kg/m².    Intake/Output - Last 3 Shifts       02/13 0700 - 02/14 0659 02/14 0700 - 02/15 0659 02/15 0700 - 02/16 0659    P.O. 200 1180     I.V. (mL/kg) 0 (0)      Other 0 400     NG/ 620     Total Intake(mL/kg) 380 (5.7) 2200 (33.1)     Urine (mL/kg/hr) 0 (0) 0 (0)     Emesis/NG output 0 (0) 0 (0)     Drains 60 (0) 120 (0.1)     Other 0 (0) 1000 (0.6)     Stool 0 (0) 0 (0)     Blood 0 (0)      Total Output 60 1120      Net +320 +1080             Urine Occurrence 0 x 0 x     Stool Occurrence 1 x 2 x     Emesis Occurrence 0 x 1 x           Physical Exam   Constitutional: He is oriented to person, place, and time. He appears well-developed. No distress.   Temporal and distal extremity muscle wasting   HENT:   Head: Normocephalic and atraumatic.   Mouth/Throat: No oropharyngeal exudate.   dylan tube   Eyes: EOM are normal. Pupils are equal, round, and reactive to light. Scleral icterus is present.   Neck: Normal range of motion. Neck supple. No JVD present. No thyromegaly present.   Cardiovascular: Normal rate, regular rhythm, normal heart sounds and intact distal pulses.    No murmur heard.  Pulmonary/Chest: Effort normal. No respiratory distress. He has  decreased breath sounds in the right middle field, the right lower field and the left lower field. He has no wheezes. He exhibits no tenderness.   Diminished to RLL   Abdominal: Soft. Bowel sounds are normal. He exhibits ascites. He exhibits no distension. There is tenderness (mild). There is no rebound and no guarding.   Dressing to chevron.  Wd vac removed 1/12/18  Dependent edema present R flank; surrounding erythema   Musculoskeletal: Normal range of motion. He exhibits edema (2+ LE edema). He exhibits no tenderness.   Neurological: He is alert and oriented to person, place, and time. He has normal reflexes.   Skin: Skin is warm and dry. Capillary refill takes 2 to 3 seconds. He is not diaphoretic. No erythema.   jaundice   Psychiatric: He has a normal mood and affect. His behavior is normal. Judgment and thought content normal. His mood appears not anxious.   Depressed mood   Nursing note and vitals reviewed.      Laboratory:  Immunosuppressants         Stop Route Frequency     cycloSPORINE (NEORAL) 100 mg/mL microemulsion solution 175 mg      -- Oral 2 times daily        CBC:     Recent Labs  Lab 02/15/18  0635   WBC 2.77*   RBC 2.67*   HGB 8.2*   HCT 25.2*      MCV 94   MCH 30.7   MCHC 32.5     CMP:     Recent Labs  Lab 02/15/18  0635   GLU 87   CALCIUM 9.2   ALBUMIN 1.9*   PROT 5.3*      K 3.8   CO2 25      BUN 16   CREATININE 4.1*   ALKPHOS 118   ALT <5*   AST 17   BILITOT 2.1*     Coagulation: No results for input(s): PT, INR, APTT in the last 168 hours.  Labs within the past 24 hours have been reviewed.    Diagnostic Results:  None

## 2018-02-15 NOTE — ASSESSMENT & PLAN NOTE
- CXR in ER with large right pleural effusion with subjective c/o worsening SOB  - thoracentesis completed- 1200 ml removed.  SOB with significant improvement.   - Cell count reviewed and negative for infection.   - resp even and non labored.  O2 sat 94-98%.  - Repeat thora 1/19 negative for infection per cell count.  - Reaccumulating fluid on xray 1/21.  - thoracentesis 1/31 - 1.4L removed, negative for infection  - Reaccumulating fluid again on xray 2/10.   -Repeat chest x-ray today.  -May need thoracentesis again

## 2018-02-15 NOTE — NURSING
"Nurse reported to STAR Godwin, that the pt has a 100.3 temp and the pt's mother is worried, "he just seems so much more blah today". No new orders. Pt last had BC drawn on 2/11/18 with no growth to date. Pt is anuric.  "

## 2018-02-15 NOTE — ASSESSMENT & PLAN NOTE
- Started on vanc/cefepime for ERCP. Now transitioned to vanc/zosyn.  - Blood cultures sent, prelim NGTD. Repeat blood cultures and chest xray today 2/10.  - thoracentesis 2/1 - fluid negative for infection.  - atelectasis noted on ct scan - start breathing tx & CPT.  - now cmv positive - started treatment 2/5/18.  - taken to OR 2/7 for exp lap - fluid collections drained, cultures pending.  - Patient with low grade fever today, 100.3. Plan for CT A/P today, repeat blood cx, chest x-ray. Keep on augmentin for now but will transition back to IV broad spectrum antibiotics if spikes another temperature or if clinically appears worse. Will also consider PBS consult for repeat ERCP if spikes another temperature. Will also re consult ID if spikes another fever.

## 2018-02-15 NOTE — ASSESSMENT & PLAN NOTE
- HD resumed on previous admission. Now anuric and dialyzes M-W-F.  - Nephrology following.    - ktm consulted for possible kidney transplant.

## 2018-02-15 NOTE — ASSESSMENT & PLAN NOTE
- detected at 370 on CMV PCR 2/1.  - case discussed with ID and will hold off on treatment at this time given low WBC.   - undetected CMV PCR 2/8.  - continue Valcyte 200 mg every Mon, Wed, Fri.  -CMV PCR 2/15 pending

## 2018-02-15 NOTE — PROGRESS NOTES
Follow up on transverse abdominal wound     02/15/18 1424   Pain/Comfort Assessments   (POSS) [place] Opioid-Induced Sed Scale 1 - Awake and alert   Pain Reassessment   Pain Rating Prior to Med Admin 7       Incision/Site 10/30/17 0911 abdomen transverse   Date First Assessed/Time First Assessed: 10/30/17 0911   Present Prior to Hospital Arrival?: Yes  Location: abdomen  Orientation: transverse   Wound Image    Incision WDL ex   Dressing Appearance No dressing;Intact;Clean  (lateral wound ANDRZEJ)   Drainage Amount None   Drainage Characteristics/Odor No odor   Appearance Pink;Dry;Other (see comments)  (lateral aspect scabbed and crusted)   Periwound Area Intact   Wound Edges Open   Wound Length (cm) 0.7   Wound Width (cm) 6   Depth (cm) (medial wound 1x10x0.1cm partial thickness reopened area)   Care Cleansed with:;Sterile normal saline   Dressing Hydrocolloid    Transverse wound discussed with Citlali Bridges PA-C .   Lateral aspect on incision had been left ANDRZEJ and is now crusted over with scab.  Medial incision has reopened . Feel it is due to ascites. Placed hydrocolloid dressing to area in an effort to promote autolysis of the scab and promote moist wound healing.   Zoë Murphy RN CWON  [phone]

## 2018-02-15 NOTE — ASSESSMENT & PLAN NOTE
- tbili with increase prompting ERCP 1/30 with stones and sludge, stents placed.  - T bili remains elevated but stable. Will consult PBS for repeat ERCP if spikes another fever. Trend daily.

## 2018-02-16 LAB
ALBUMIN SERPL BCP-MCNC: 2 G/DL
ALP SERPL-CCNC: 125 U/L
ALT SERPL W/O P-5'-P-CCNC: <5 U/L
ANION GAP SERPL CALC-SCNC: 11 MMOL/L
APPEARANCE FLD: NORMAL
AST SERPL-CCNC: 25 U/L
BACTERIA BLD CULT: NORMAL
BACTERIA BLD CULT: NORMAL
BASOPHILS # BLD AUTO: 0.03 K/UL
BASOPHILS NFR BLD: 1.1 %
BASOPHILS NFR FLD MANUAL: 1 %
BILIRUB SERPL-MCNC: 2.2 MG/DL
BODY FLD TYPE: NORMAL
BUN SERPL-MCNC: 23 MG/DL
CALCIUM SERPL-MCNC: 9.5 MG/DL
CHLORIDE SERPL-SCNC: 101 MMOL/L
CMV DNA SERPL NAA+PROBE-ACNC: NORMAL IU/ML
CO2 SERPL-SCNC: 23 MMOL/L
COLOR FLD: YELLOW
CREAT SERPL-MCNC: 5.1 MG/DL
CYCLOSPORINE BLD LC/MS/MS-MCNC: 252 NG/ML
DIFFERENTIAL METHOD: ABNORMAL
EOSINOPHIL # BLD AUTO: 0.2 K/UL
EOSINOPHIL NFR BLD: 6.3 %
EOSINOPHIL NFR FLD MANUAL: 21 %
ERYTHROCYTE [DISTWIDTH] IN BLOOD BY AUTOMATED COUNT: 14.5 %
EST. GFR  (AFRICAN AMERICAN): 16.4 ML/MIN/1.73 M^2
EST. GFR  (NON AFRICAN AMERICAN): 14.2 ML/MIN/1.73 M^2
GLUCOSE SERPL-MCNC: 84 MG/DL
HCT VFR BLD AUTO: 26.8 %
HGB BLD-MCNC: 8.8 G/DL
IMM GRANULOCYTES # BLD AUTO: 0.07 K/UL
IMM GRANULOCYTES NFR BLD AUTO: 2.5 %
INR PPP: 1
LYMPHOCYTES # BLD AUTO: 1 K/UL
LYMPHOCYTES NFR BLD: 33.7 %
LYMPHOCYTES NFR FLD MANUAL: 17 %
MAGNESIUM SERPL-MCNC: 1.8 MG/DL
MCH RBC QN AUTO: 30.3 PG
MCHC RBC AUTO-ENTMCNC: 32.8 G/DL
MCV RBC AUTO: 92 FL
MESOTHL CELL NFR FLD MANUAL: 9 %
MONOCYTES # BLD AUTO: 0.3 K/UL
MONOCYTES NFR BLD: 10.9 %
MONOS+MACROS NFR FLD MANUAL: 39 %
NEUTROPHILS # BLD AUTO: 1.3 K/UL
NEUTROPHILS NFR BLD: 45.5 %
NEUTROPHILS NFR FLD MANUAL: 13 %
NRBC BLD-RTO: 0 /100 WBC
PHOSPHATE SERPL-MCNC: 3.8 MG/DL
PLATELET # BLD AUTO: 152 K/UL
PMV BLD AUTO: 10.5 FL
POTASSIUM SERPL-SCNC: 4 MMOL/L
PROT SERPL-MCNC: 5.6 G/DL
PROTHROMBIN TIME: 10.9 SEC
RBC # BLD AUTO: 2.9 M/UL
SODIUM SERPL-SCNC: 135 MMOL/L
WBC # BLD AUTO: 2.85 K/UL
WBC # FLD: 117 /CU MM

## 2018-02-16 PROCEDURE — 25000003 PHARM REV CODE 250: Performed by: INTERNAL MEDICINE

## 2018-02-16 PROCEDURE — 63600175 PHARM REV CODE 636 W HCPCS: Performed by: RADIOLOGY

## 2018-02-16 PROCEDURE — 87075 CULTR BACTERIA EXCEPT BLOOD: CPT

## 2018-02-16 PROCEDURE — 90935 HEMODIALYSIS ONE EVALUATION: CPT | Mod: ,,, | Performed by: INTERNAL MEDICINE

## 2018-02-16 PROCEDURE — 63600175 PHARM REV CODE 636 W HCPCS: Performed by: NURSE PRACTITIONER

## 2018-02-16 PROCEDURE — 25000003 PHARM REV CODE 250: Performed by: NURSE PRACTITIONER

## 2018-02-16 PROCEDURE — 85025 COMPLETE CBC W/AUTO DIFF WBC: CPT

## 2018-02-16 PROCEDURE — 25000003 PHARM REV CODE 250: Performed by: SURGERY

## 2018-02-16 PROCEDURE — 99900035 HC TECH TIME PER 15 MIN (STAT)

## 2018-02-16 PROCEDURE — 85610 PROTHROMBIN TIME: CPT

## 2018-02-16 PROCEDURE — 87070 CULTURE OTHR SPECIMN AEROBIC: CPT

## 2018-02-16 PROCEDURE — 94664 DEMO&/EVAL PT USE INHALER: CPT

## 2018-02-16 PROCEDURE — 80053 COMPREHEN METABOLIC PANEL: CPT

## 2018-02-16 PROCEDURE — 83735 ASSAY OF MAGNESIUM: CPT

## 2018-02-16 PROCEDURE — 20600001 HC STEP DOWN PRIVATE ROOM

## 2018-02-16 PROCEDURE — 89051 BODY FLUID CELL COUNT: CPT

## 2018-02-16 PROCEDURE — 87205 SMEAR GRAM STAIN: CPT

## 2018-02-16 PROCEDURE — 87102 FUNGUS ISOLATION CULTURE: CPT

## 2018-02-16 PROCEDURE — 63600175 PHARM REV CODE 636 W HCPCS: Mod: JG | Performed by: SURGERY

## 2018-02-16 PROCEDURE — 97803 MED NUTRITION INDIV SUBSEQ: CPT | Performed by: DIETITIAN, REGISTERED

## 2018-02-16 PROCEDURE — 84100 ASSAY OF PHOSPHORUS: CPT

## 2018-02-16 PROCEDURE — 99233 SBSQ HOSP IP/OBS HIGH 50: CPT | Mod: 24,,, | Performed by: PHYSICIAN ASSISTANT

## 2018-02-16 PROCEDURE — 90935 HEMODIALYSIS ONE EVALUATION: CPT

## 2018-02-16 PROCEDURE — 80158 DRUG ASSAY CYCLOSPORINE: CPT

## 2018-02-16 PROCEDURE — 36415 COLL VENOUS BLD VENIPUNCTURE: CPT

## 2018-02-16 RX ORDER — SODIUM CHLORIDE 9 MG/ML
INJECTION, SOLUTION INTRAVENOUS
Status: DISCONTINUED | OUTPATIENT
Start: 2018-02-16 | End: 2018-02-20

## 2018-02-16 RX ORDER — SODIUM CHLORIDE 9 MG/ML
INJECTION, SOLUTION INTRAVENOUS ONCE
Status: COMPLETED | OUTPATIENT
Start: 2018-02-16 | End: 2018-02-16

## 2018-02-16 RX ORDER — FENTANYL CITRATE 50 UG/ML
50 INJECTION, SOLUTION INTRAMUSCULAR; INTRAVENOUS ONCE
Status: COMPLETED | OUTPATIENT
Start: 2018-02-16 | End: 2018-02-16

## 2018-02-16 RX ADMIN — OXYCODONE HYDROCHLORIDE 5 MG: 5 TABLET ORAL at 06:02

## 2018-02-16 RX ADMIN — MORPHINE 200 MG: 10 SOLUTION ORAL at 06:02

## 2018-02-16 RX ADMIN — METOCLOPRAMIDE HYDROCHLORIDE 5 MG: 5 SOLUTION ORAL at 06:02

## 2018-02-16 RX ADMIN — METOCLOPRAMIDE HYDROCHLORIDE 5 MG: 5 SOLUTION ORAL at 04:02

## 2018-02-16 RX ADMIN — SODIUM CHLORIDE: 9 INJECTION, SOLUTION INTRAVENOUS at 01:02

## 2018-02-16 RX ADMIN — OXYCODONE HYDROCHLORIDE 5 MG: 5 TABLET ORAL at 02:02

## 2018-02-16 RX ADMIN — ERYTHROPOIETIN 10000 UNITS: 10000 INJECTION, SOLUTION INTRAVENOUS; SUBCUTANEOUS at 10:02

## 2018-02-16 RX ADMIN — AMOXICILLIN AND CLAVULANATE POTASSIUM 500 MG: 250; 62.5 POWDER, FOR SUSPENSION ORAL at 02:02

## 2018-02-16 RX ADMIN — URSODIOL 300 MG: 300 CAPSULE ORAL at 03:02

## 2018-02-16 RX ADMIN — URSODIOL 300 MG: 300 CAPSULE ORAL at 08:02

## 2018-02-16 RX ADMIN — HEPARIN SODIUM 1000 UNITS: 1000 INJECTION, SOLUTION INTRAVENOUS; SUBCUTANEOUS at 11:02

## 2018-02-16 RX ADMIN — LEVETIRACETAM 500 MG: 100 SOLUTION ORAL at 02:02

## 2018-02-16 RX ADMIN — SODIUM CHLORIDE: 0.9 INJECTION, SOLUTION INTRAVENOUS at 08:02

## 2018-02-16 RX ADMIN — ATOVAQUONE 1500 MG: 750 SUSPENSION ORAL at 02:02

## 2018-02-16 RX ADMIN — FENTANYL CITRATE 50 MCG: 50 INJECTION, SOLUTION INTRAMUSCULAR; INTRAVENOUS at 02:02

## 2018-02-16 RX ADMIN — DRONABINOL 2.5 MG: 2.5 CAPSULE ORAL at 06:02

## 2018-02-16 RX ADMIN — DRONABINOL 2.5 MG: 2.5 CAPSULE ORAL at 04:02

## 2018-02-16 RX ADMIN — LEVETIRACETAM 500 MG: 100 SOLUTION ORAL at 08:02

## 2018-02-16 RX ADMIN — CYCLOSPORINE 175 MG: 100 SOLUTION ORAL at 06:02

## 2018-02-16 RX ADMIN — LEVOTHYROXINE SODIUM 75 MCG: 75 TABLET ORAL at 06:02

## 2018-02-16 RX ADMIN — OMEPRAZOLE 40 MG: 40 CAPSULE, DELAYED RELEASE ORAL at 06:02

## 2018-02-16 RX ADMIN — METOCLOPRAMIDE HYDROCHLORIDE 5 MG: 5 SOLUTION ORAL at 08:02

## 2018-02-16 RX ADMIN — ACETAMINOPHEN 650 MG: 325 TABLET, FILM COATED ORAL at 08:02

## 2018-02-16 RX ADMIN — URSODIOL 300 MG: 300 CAPSULE ORAL at 06:02

## 2018-02-16 NOTE — ASSESSMENT & PLAN NOTE
- tbili with increase prompting ERCP 1/30 with stones and sludge, stents placed.  - T bili remains elevated but stable. Trend daily.

## 2018-02-16 NOTE — PROCEDURES
Radiology Post-Procedure Note    Pre Op Diagnosis: Pleural effusion  Post Op Diagnosis: Same    Procedure: Thoracentesis.    Procedure performed by: Shen Hickman MD; Staff radiologist Jhonny Valdes MD was available at all times throughout the procedure.     Written Informed Consent Obtained: Yes  Specimen Removed: YES - 20 ml clear yellow fluid.  Estimated Blood Loss: Minimal    Findings:   Successful Thoracentesis.    Patient tolerated procedure well.    Shen Hickman  R-4  Pager: [phone]

## 2018-02-16 NOTE — PROGRESS NOTES
EZEKIEL received notification from RN coordinator Princess Guillen that RN Monica at [facility] wondering about pt's medical status. EZEKIEL contacted Monica ([phone]) and notified her that pt still in the hospital with no discharge date at this time. Monica aware that pt has [id number] pending and is requesting a copy once completed. EZEKIEL to notify Liver SW Radha Malagon who is handling the paperwork. Monica also reports that if pt is deemed chronic, [facility] will need to discharge to the pt before he can be readmitted to their HD unit once discharged from hospital. Monica stated that they will still take pt once discharged but SW team will need to place new referral to [facility]. EZEKIEL expressed understanding and will make sure to notate this in the SW team's report. Monica reports that pt will also need an updated Hep B test. SW to notify mid level provider. SW remains available.     EZEKIEL spoke with PA, Citlali Bridges, who is aware pt will need updated Hep B test. SW remains available.

## 2018-02-16 NOTE — PLAN OF CARE
Problem: Patient Care Overview  Goal: Plan of Care Review  Outcome: Ongoing (interventions implemented as appropriate)  Patient tolerated treatment well. 1000 ml removed. Catheter flushed with ns and heparin 1000 units/ml instilled to fill volume of each catheter port. Catheter clamped, capped, and taped. Patient without complaint.

## 2018-02-16 NOTE — ASSESSMENT & PLAN NOTE
- Post op course complicated by fevers and hyperbilirubinemia.  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  - AST/ALT normal. Tbili/alkphos remain elevated.  - Liver US 1/8 showed 3.9 cm complex fluid collection anterior to right lobe and moderate nonspecific complex ascites inferior to transplant.   - IR placed drain 1/11/18, cell count negative for infection.  - PBS consulted. PBS not comfortable proceeding with EUS with liver biopsy given fever and possible infectious process going on. ERCP 1/17 with stone and sludge. Pt placed on Actigall - treated with 10 day course of abx.  ID now signed off.  - Bili elevated - liver u/s 1/28  - Liver biopsy 1/23 without rejection.  - consulted PBS for ERCP - Choledocholithiasis was found along with sludge, stents placed 1/30.  - T bili remains elevated but stable. Monitor.

## 2018-02-16 NOTE — PROGRESS NOTES
[facility]  Adult Nutrition  Progress Note    SUMMARY     Recommendations    Recommendation/Intervention: New TF order placed today. This formula is to aid in motility and digestion. It contains more free fluid and is not formulated for renal patients. Will monitor tolerance.     Peptamen 1.5 with Prebio at goal rate of 50mL/hr from 6pm - 9am. Initiate at 10mL/hr and increase by 20mL q4hrs until goal of 50ml/hr is reached. Add 3 packets Beneprotein nightly with water flushes. This provides 1200kcal, 69g protein, and 578mL free fluid, 60% EEN and 70% EPN. Hold for residuals >500mL.    Oral intake remains poor at this time - 0% PO today and yesterday. If oral intake does not improve over the weekend and J Tube not to be placed, recommend modifying TF regimen to 24 hrs. This is not a renal formula - monitor renal electrolytes.     Goals: Consume/tolerate > 85% EEN and EPN  Nutrition Goal Status: goal not met  Communication of RD Recs: reviewed with RIMA Godwin      Reason for Assessment    Reason for Assessment: RD follow-up  Diagnosis: transplant/postoperative complications  Relevent Medical History: OLTx 10/19/2017, complicate post-op w/ LAURA ongoing, chronic malnutrition, cirrhosis 2' EtOH   Interdisciplinary Rounds: attended     General Information Comments: pt now switched to nocturnal tube feeds, however new medication given at 6am requires TF to be turned off for 1 hr prior to administration, pt continues with abdominal discomfort; ate 0% today and yesterday    Nutrition Discharge Planning: Adequate nutrition PO vs EN    Nutrition Prescription Ordered    Current Diet Order: Regular  Nutrition Order Comments: TF running from 8pm - 5am at 30ml/hr   Current Nutrition Support Formula Ordered: Novasource Renal  Current Nutrition Support Rate Ordered: 55 (ml)  Current Nutrition Support Frequency Ordered: ml/hr x 12 hrs  Oral Nutrition Supplement: Boost Breeze     Evaluation of Received  "Nutrients/Fluid Intake    Enteral Calories (kcal): 420 (TF ran from 8pm - 5am and only reached 33ml/hr, per RN in order to give patient synthroid at 6am)   Enteral Protein (gm): 19  Enteral (Free Water) Fluid (mL): 151     Energy Calories Required: not meeting needs  % Kcal Needs: 20      Protein Required: not meeting needs  % Protein Needs: 20    I/O: 4.3L since admit       Fluid Required:  (per MD)  Comments: LBM 2/15  Tolerance: not tolerating  % Intake of Estimated Energy Needs: 0 - 25 %  % Meal Intake: 0%     Nutrition Risk Screen     Nutrition Risk Screen: large or nonhealing wound, burn or pressure ulcer    Nutrition/Diet History    Patient Reported Diet/Restrictions/Preferences: low salt  Typical Food/Fluid Intake: eats very little at this time  Food Preferences: No cultural or [employer] food preferences noted  Meal/Snack Patterns: pt likes to cook, when feeling well will cook and then only eat bites  Supplemental Drinks or Food Habits:  (protein powders sometimes)  Factors Affecting Nutritional Intake: altered gastrointestinal function, abdominal distention, decreased appetite, nausea/vomiting      Labs/Tests/Procedures/Meds       Pertinent Labs Reviewed: reviewed, pertinent  Pertinent Labs Comments: Na 135, BUN 23, Cr 5.1, gfr 14.2, TBili 14.2, PAB 10  Pertinent Medications Reviewed: reviewed, pertinent  Pertinent Medications Comments: docusate, epoetin, metoclopramide, psyllium, marinol    Physical Findings    Overall Physical Appearance: generalized wasting, loss of muscle mass, loss of subcutaneous fat, weak  Tubes: nasoduoduenal tube  Oral/Mouth Cavity: WDL  Skin: intact    Anthropometrics    Temp: 98.4 °F (36.9 °C)     Height: 5' 6" (167.6 cm)  Weight Method: Bed Scale  Weight: 66.5 kg (146 lb 9.7 oz)  Ideal Body Weight (IBW), Male: 142 lb     % Ideal Body Weight, Male (lb): 112.68 lb     BMI (Calculated): 25.9  BMI Grade: 25 - 29.9 - overweight  Weight Loss: unintentional  Usual Body Weight (UBW), " k kg (2017 )     % Usual Body Weight: 86.71  % Weight Change From Usual Weight: -13.47 %      Estimated/Assessed Needs    Weight Used For Calorie Calculations: 62.3 kg (137 lb 5.6 oz)   Height (cm): 167.6 cm  Energy Calorie Requirements (kcal): 6962-7054 (30-35 kcal/kg)  Energy Need Method: Kcal/kg   RMR (Bond-St. Jeor Equation): 1535.75      Weight Used For Protein Calculations: 62.3 kg (137 lb 5.6 oz)  Protein Requirements: 81-94g (1.3-1.5 g/kg)    Fluid Requirements (mL): 1mL/kcal  Fluid Need Method: RDA Method      RDA Method (mL):        Assessment and Plan    Severe protein-calorie malnutrition    Nutrition Diagnosis  Inadequate oral intake    Related to (etiology):   Decreased appetite, abdominal distention    Signs and Symptoms (as evidenced by):   Pt eating 0% of meals and relying on enteral nutrition for 100% of EPN, EEN     Interventions/Recommendations (treatment strategy):  See recs    Nutrition Diagnosis Status:   Continues            Monitor and Evaluation    Food and Nutrient Intake: energy intake, food and beverage intake  Food and Nutrient Adminstration: diet order  Knowledge/Beliefs/Attitudes: food and nutrition knowledge/skill  Physical Activity and Function: nutrition-related ADLs and IADLs  Anthropometric Measurements: weight, weight change, body mass index  Biochemical Data, Medical Tests and Procedures: electrolyte and renal panel, lipid profile, gastrointestinal profile, glucose/endocrine profile, inflammatory profile  Nutrition-Focused Physical Findings: overall appearance    Nutrition Risk    Level of Risk:  (2x/week)    Nutrition Follow-Up    RD Follow-up?: Yes

## 2018-02-16 NOTE — PROGRESS NOTES
[facility]  Liver Transplant  Progress Note    Patient Name: Jhonny Diana  MRN: [id number]  Admission Date: 2018  Hospital Length of Stay: 36 days  Code Status: Full Code  Primary Care Provider: Primary Doctor No  Post-Operative Day: 120    ORGAN:   LIVER  Disease Etiology: Acute Alcoholic Hepatitis  Donor Type:    - Brain Death  CDC High Risk:   No  Donor CMV Status:   Donor CMV Status: Positive  Donor HBcAB:   Negative  Donor HCV Status:   Negative  Whole or Partial: Whole Liver  Biliary Anastomosis: End to End  Arterial Anatomy: Standard  Subjective:     History of Present Illness:  Jhonny Diana is a 28 y/o male with past medical history of alcoholic cirrhosis.  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 Found to have peritonitis in November (WBC 5000, 75% PNM) neg for bile leak. He was treated initially with vanc/cefepime. Repeat cell counts  with some improvement (WBC 1400, 45% PNM). He has undergone multiple paracenteses as well as abscess drainage of perihepatic fluid collections and treated with antimicrobial therapy but no positive cultures. Of note, biliary stricture also identified and ERCP performed on 2017 with sphincterotomy and biliary stent placed. Liver tests still have not normalized despite intervention, bilirubin and AP remain elevated. Other pertinent PMH current wound vac in place 2/2 wound infection, malnutrition requiring TPN for short course and ongoing hypoalbuminemia, and seizure activity while on prograf and has since been switched to cyclosporine without reoccurrence.  He presented to the ER for fever, abdominal pain, and N/V. He reports fever (103) for 1  day prior. Overnight, he developed N/V, reports small amount of green emesis with new left sided pain. He also endorses worsening SOB with exertion. He was scheduled as an outpatient for follow up paracentesis and IR drainage of fluid collection. CXR in ER shows large pleural effusion with subsegmental atelectasis. Infectious work up initiated in ER. His ANC is 900. Broad spectrum antibiotics initiated in ED. He denies chest pain, palpitations, diarrhea, constipation, or back pain. Denies any sick contacts.    Hospital Course:  Thoracentesis (1.2L off), Paracentesis (1.6L off), and IR drainage of fluid collection 1/11, all fluids negative for infection. ID consulted. Broad spectrum antibiotics d/c'd 1/15. Chronically malnourished with poor PO intake, prealbumin 7. Shane tube placed 1/15 for tube feedings.     ERCP 1/17 with sludge and a biliary stone which was removed and stent exchanged.  Remained with n/v; therefore, TF remained on hold and TPN continued.   Pt with fever s/p ERCP on 1/17 which continued until 1/19.  Vanc/cefepime restarted.  Blood cx 1/17 and 1/18 NGTD.  ID reconsulted.  Fungal markers sent.  CT C/A/P obtained.  With large R pleural effusion and ascites- both drained 1/19 and negative for infx per cell count. Pt afebrile 1/20. Liver biopsy 1/23 - without rejection.  EGD 1/25 - unremarkable for source of GI symptoms.  Resume diet along with other GI recommendations. SHANE tube placed for tube feeds.       Interval History: No acute events overnight. Patient more alert today, feeling okay. No episodes of nausea/vomiting. No further episodes of fever. Patient received HD today, tolerated well. Right thoracentesis also performed in IR with 1500 cc off. Fluid analysis pending. Cont oral antibiotic. Monitor.     Scheduled Meds:   amoxicillin-pot clavulanate 250-62.5 mg/5ml  500 mg Oral Daily    atovaquone  1,500 mg Oral Daily    cycloSPORINE  175 mg Oral BID    docusate sodium  100 mg Oral BID     dronabinol  2.5 mg Oral BID AC    epoetin maurisio (PROCRIT) injection  10,000 Units Intravenous Every Mon, Wed, Fri    heparin (porcine)  5,000 Units Subcutaneous Q8H    custom IVPB builder   Intravenous Q24H    levetiracetam oral soln  500 mg Oral BID    levothyroxine  75 mcg Oral Before breakfast    metoclopramide HCl  5 mg Oral QID (AC & HS)    omeprazole  40 mg Oral QAM    polyethylene glycol  17 g Oral BID    psyllium husk (aspartame)  3.4 g Oral BID    ursodiol  300 mg Oral TID    valganciclovir 50 mg/ml  200 mg Oral Every Mon, Wed, Fri     Continuous Infusions:  PRN Meds:sodium chloride, sodium chloride 0.9%, acetaminophen, albuterol-ipratropium 2.5mg-0.5mg/3mL, bisacodyl, heparin (porcine), omnipaque, ondansetron, ondansetron, oxyCODONE, prochlorperazine, simethicone, sodium chloride 0.9%, sodium chloride 0.9%    Review of Systems   Constitutional: Positive for activity change, appetite change, fatigue and fever. Negative for chills.   HENT: Negative.  Negative for congestion and facial swelling.    Eyes: Negative for pain, discharge and visual disturbance.   Respiratory: Negative for cough, chest tightness, shortness of breath and wheezing.    Cardiovascular: Negative for chest pain, palpitations and leg swelling.   Gastrointestinal: Positive for abdominal distention, abdominal pain and nausea. Negative for constipation and vomiting.   Endocrine: Negative.    Genitourinary: Positive for decreased urine volume. Negative for difficulty urinating.        Anuric   Musculoskeletal: Negative for arthralgias, back pain and myalgias.   Skin: Positive for color change and wound. Negative for rash.   Allergic/Immunologic: Positive for immunocompromised state.   Neurological: Negative for dizziness, seizures, weakness and headaches.   Psychiatric/Behavioral: Positive for decreased concentration and dysphoric mood. Negative for confusion. The patient is not nervous/anxious.    All other systems reviewed and are  negative.    Objective:     Vital Signs (Most Recent):  Temp: 98.4 °F (36.9 °C) (02/16/18 1136)  Pulse: 88 (02/16/18 1136)  Resp: 14 (02/16/18 1136)  BP: 133/78 (02/16/18 1136)  SpO2: 95 % (02/16/18 0748) Vital Signs (24h Range):  Temp:  [98.4 °F (36.9 °C)-99.9 °F (37.7 °C)] 98.4 °F (36.9 °C)  Pulse:  [83-95] 88  Resp:  [14-18] 14  SpO2:  [92 %-95 %] 95 %  BP: (125-141)/(68-87) 133/78     Weight: 66.5 kg (146 lb 9.7 oz)  Body mass index is 23.66 kg/m².    Intake/Output - Last 3 Shifts       02/14 0700 - 02/15 0659 02/15 0700 - 02/16 0659 02/16 0700 - 02/17 0659    P.O. 1180 180     I.V. (mL/kg)       Other 400  600    NG/      Total Intake(mL/kg) 2200 (33.1) 180 (2.7) 600 (9)    Urine (mL/kg/hr) 0 (0) 0 (0)     Emesis/NG output 0 (0)      Drains 120 (0.1)      Other 1000 (0.6)  3100 (5.5)    Stool 0 (0) 0 (0) 0 (0)    Blood       Total Output 1120 0 3100    Net +1080 +180 -2500           Urine Occurrence 0 x 0 x     Stool Occurrence 2 x 0 x 0 x    Emesis Occurrence 1 x            Physical Exam   Constitutional: He is oriented to person, place, and time. He appears well-developed. No distress.   Temporal and distal extremity muscle wasting   HENT:   Head: Normocephalic and atraumatic.   Mouth/Throat: No oropharyngeal exudate.   dylan tube   Eyes: EOM are normal. Pupils are equal, round, and reactive to light. Scleral icterus is present.   Neck: Normal range of motion. Neck supple. No JVD present. No thyromegaly present.   Cardiovascular: Normal rate, regular rhythm, normal heart sounds and intact distal pulses.    No murmur heard.  Pulmonary/Chest: Effort normal. No respiratory distress. He has decreased breath sounds in the right middle field, the right lower field and the left lower field. He has no wheezes. He exhibits no tenderness.   Diminished to RLL   Abdominal: Soft. Bowel sounds are normal. He exhibits ascites. He exhibits no distension. There is tenderness (mild). There is no rebound and no guarding.    Dressing to chevron.  Wd vac removed 1/12/18  Dependent edema present R flank; surrounding erythema   Musculoskeletal: Normal range of motion. He exhibits edema (2+ LE edema). He exhibits no tenderness.   Neurological: He is alert and oriented to person, place, and time. He has normal reflexes.   Skin: Skin is warm and dry. Capillary refill takes 2 to 3 seconds. He is not diaphoretic. No erythema.   jaundice   Psychiatric: He has a normal mood and affect. His behavior is normal. Judgment and thought content normal. His mood appears not anxious.   Depressed mood   Nursing note and vitals reviewed.      Laboratory:  Immunosuppressants         Stop Route Frequency     cycloSPORINE (NEORAL) 100 mg/mL microemulsion solution 175 mg      -- Oral 2 times daily        CBC:     Recent Labs  Lab 02/16/18 0436   WBC 2.85*   RBC 2.90*   HGB 8.8*   HCT 26.8*      MCV 92   MCH 30.3   MCHC 32.8     CMP:     Recent Labs  Lab 02/16/18 0436   GLU 84   CALCIUM 9.5   ALBUMIN 2.0*   PROT 5.6*   *   K 4.0   CO2 23      BUN 23*   CREATININE 5.1*   ALKPHOS 125   ALT <5*   AST 25   BILITOT 2.2*     Coagulation:     Recent Labs  Lab 02/16/18 0436   INR 1.0     Labs within the past 24 hours have been reviewed.    Diagnostic Results:  None    Assessment/Plan:     Other cytomegaloviral diseases    - detected at 370 on CMV PCR 2/1.  - case discussed with ID and will hold off on treatment at this time given low WBC.   - undetected CMV PCR 2/8.  - continue Valcyte 200 mg every Mon, Wed, Fri.  -CMV PCR 2/15 pending        Fever    - Started on vanc/cefepime for ERCP. Now transitioned to vanc/zosyn.  - Blood cultures sent, prelim NGTD. Repeat blood cultures and chest xray today 2/10.  - thoracentesis 2/1 - fluid negative for infection.  - atelectasis noted on ct scan - start breathing tx & CPT.  - now cmv positive - started treatment 2/5/18.  - taken to OR 2/7 for exp lap - fluid collections drained, cultures pending.  -  Patient with low grade fever 2/15, 100.3.  -CT A/P 2/15 with no evidence of IA fluid infection. Did show R pleural effusion s/p thoracentesis, cell count pending  -Repeat blood cx 2/15 prelim NGTD  -Cont oral antibiotic for now unless spikes another fever.         Constipation    - Enema ordered 1/20 and 1/21 with BM.  - Encourage ambulation.  - decrease narcotics.  - d/c metamucil and miralax bid as makes patient nauseated.  - increase reglan qid.  - small bowel follow through with normal findings.   - patient reports bowel movement 2/13        Other ascites    - Paracentesis performed 1/11/18 with 1600 ml removed.    - fluid negative for infection.   - Repeat para 1/19 negative for infection per cell count.  - no fluid seen for paracentesis on 1/23.        Severe protein-calorie malnutrition    - Poor PO intake since transplant requiring short course of TPN during previous hospital stay.   - albumin remains decreased but appetite slowly improving per pt.   - Dietary consulted. Will need to closely monitor PO intake.   - supplements also ordered.   - SHANE with tube feeding start 1/16. Stopped 1/17/18, was not tolerating. Shane removed 1/20.  - prealbumin 7 on 1/15.  - TPN started 1/16/18.   - Pt able to eat minimally 1/21 and 1/22 which is an improvement.  - gi consulted - EGD reviewed, increased ppi bid, miralax bid, and metamucil bid, decreased narcotics.  - continue reglan, placed SHANE tube with tube feeds - transitioned to nightly feeds. Not tolerating well due to nausea.   - small bowel follow through with normal findings.   - consulted gi for possible gj tube placement  - Marinol started with some improvement in appetite seen.   -Will monitor through the weekend with possible plan for gj tube on Monday.           Nausea and vomiting    - h/o constipation, reports having regular BM's with bowel regimen.  - vomiting episode x 1 at home. Anti-emetics ordered PRN.  - KUB repeated 1/14/18 given increased abdominal  pain- mild, generalized bowel distention suggesting ileus.  Diet changed to clears as tolerated for bowel rest .  - SHANE tube placed 1/16 for tube feeds. Not tolerating tube feeds, stopped 1/17/18.  - D/C shane 1/19, now in place as of 1/25.  - chronic constipation, Cont bowel regimen.   - GI consulted. See protein calorie malnutrition.  - not tolerating TFs due to nausea. Holding periodically overnight.         Recurrent pleural effusion on right    - CXR in ER with large right pleural effusion with subjective c/o worsening SOB  - thoracentesis completed- 1200 ml removed.  SOB with significant improvement.   - Cell count reviewed and negative for infection.   - resp even and non labored.  O2 sat 94-98%.  - Repeat thora 1/19 negative for infection per cell count.  - Reaccumulating fluid on xray 1/21.  - thoracentesis 1/31 - 1.4L removed, negative for infection  - Reaccumulating fluid again on xray 2/10.   -Thoracentesis performed today 2/16 with 1500 cc off, fluid analysis pending.         Biliary stricture of transplanted liver    - tbili with increase prompting ERCP 1/30 with stones and sludge, stents placed.  - T bili remains elevated but stable. Trend daily.         Anemia of chronic disease    - H/H stable. Cont to monitor with daily cbc.         Delayed surgical wound healing    - wd vac removed 1/12/18.  Wound healing well. Aquacel AG and Mepilex border dressing applied via wound care recs.           At risk for opportunistic infections    - CMV detected 2/1 now on tx dose valcyte.  - bactrim held.  -CMV PCR 2/15 pending.           Long-term use of immunosuppressant medication    - Maintenance IS with cyclosporine. MMF on hold for infections and neutropenia. Continue to check cyclosporine level daily. Assess for toxicity and adjust level as needed.        Prophylactic immunotherapy    - See long term use of immunosuppression.         Liver transplanted    - Post op course complicated by fevers and  hyperbilirubinemia.  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  - AST/ALT normal. Tbili/alkphos remain elevated.  - Liver US 1/8 showed 3.9 cm complex fluid collection anterior to right lobe and moderate nonspecific complex ascites inferior to transplant.   - IR placed drain 1/11/18, cell count negative for infection.  - PBS consulted. PBS not comfortable proceeding with EUS with liver biopsy given fever and possible infectious process going on. ERCP 1/17 with stone and sludge. Pt placed on Actigall - treated with 10 day course of abx.  ID now signed off.  - Bili elevated - liver u/s 1/28  - Liver biopsy 1/23 without rejection.  - consulted PBS for ERCP - Choledocholithiasis was found along with sludge, stents placed 1/30.  - T bili remains elevated but stable. Monitor.             Acute renal failure with tubular necrosis    - HD resumed on previous admission. Now anuric and dialyzes M-W-F.  - Nephrology following.    - ktm consulted for possible kidney transplant.            VTE Risk Mitigation         Ordered     heparin (porcine) injection 1,000 Units  As needed (PRN)     Route:  Intra-Catheter        02/05/18 0945     heparin (porcine) injection 5,000 Units  Every 8 hours     Route:  Subcutaneous        01/31/18 0957     Medium Risk of VTE  Once      01/11/18 0351     Place sequential compression device  Until discontinued      01/11/18 0351          The patients clinical status was discussed at multidisplinary rounds, involving transplant surgery, transplant medicine, pharmacy, nursing, nutrition, and social work    Discharge Planning: Not a candidate for discharge at this time.       Citlali Bridges [email or web address]  Liver Transplant  [facility]

## 2018-02-16 NOTE — H&P
Inpatient Radiology Pre-procedure Note    History of Present Illness:  Jhonny Diana is a 27 y.o. male with right pleural effusion who presents for ultrasound guided thoracentesis.    Admission H&P reviewed.  Past Medical History:   Diagnosis Date    Alcoholic hepatitis with ascites     Alcoholic hepatitis with ascites     History of hematemesis 9/28/2017    Hypertension     Renal disorder      Past Surgical History:   Procedure Laterality Date    APPENDECTOMY      ERCP      ESOPHAGOGASTRODUODENOSCOPY      LIVER TRANSPLANT         Review of Systems:   As documented in primary team H&P    Home Meds:   Prior to Admission medications    Medication Sig Start Date End Date Taking? Authorizing Provider   aspirin (ECOTRIN) 81 MG EC tablet Take 1 tablet (81 mg total) by mouth once daily. 11/8/17   Nathanael Medina MD   atovaquone (MEPRON) 750 mg/5 mL Susp Take 10 mLs (1,500 mg total) by mouth once daily. Stop on 4/17/18 10/19/17 4/17/18  Nathanael Medina MD   cycloSPORINE modified, NEORAL, 25 MG capsule Take 3 capsules (75 mg total) by mouth 2 (two) times daily. 1/9/18   Quiana Carter MD   docusate sodium (COLACE) 100 MG capsule Take 100 mg by mouth 3 (three) times daily as needed for Constipation.    Historical Provider, MD   ergocalciferol (ERGOCALCIFEROL) 50,000 unit Cap Take 1 capsule (50,000 Units total) by mouth every 7 days. 11/7/17   Nathanael Medina MD   levETIRAcetam (KEPPRA) 500 MG Tab Take 1 tablet (500 mg total) by mouth 2 (two) times daily. 11/7/17 11/7/18  Nathanael Medina MD   levothyroxine (SYNTHROID) 75 MCG tablet Take 1 tablet (75 mcg total) by mouth before breakfast. 12/21/17   Nathanael Medina MD   mirtazapine (REMERON) 7.5 MG Tab Take 1 tablet (7.5 mg total) by mouth every evening. 12/20/17   Nathanael Medina MD   multivitamin (THERAGRAN) tablet Take 1 tablet by mouth once daily. 11/7/17   Nathanael Medina MD   omeprazole (PRILOSEC) 40 MG capsule Take 1 capsule (40 mg total) by mouth before breakfast. 1/25/18  1/25/19  Nathanael Medina MD   ondansetron (ZOFRAN-ODT) 8 MG TbDL Take 1 tablet (8 mg total) by mouth every 8 (eight) hours as needed (nausea). 1/10/18   Quiana Carter MD   oxyCODONE (ROXICODONE) 10 mg Tab immediate release tablet Take 0.5-1 tablets (5-10 mg total) by mouth every 6 (six) hours as needed for Pain. 1/10/18   Quiana Carter MD   pantoprazole (PROTONIX) 40 MG tablet Take 1 tablet (40 mg total) by mouth once daily. 11/8/17   Nathanael Medina MD   sodium bicarbonate 650 MG tablet Take 2 tablets (1,300 mg total) by mouth 2 (two) times daily. 11/30/17   Geronimo Vera PA-C   thiamine 100 MG tablet Take 1 tablet (100 mg total) by mouth once daily. 11/7/17   Nathanael Medina MD   ursodiol (ACTIGALL) 300 mg capsule Take 1 capsule (300 mg total) by mouth 2 (two) times daily. 12/20/17   Nathanael Medina MD     Scheduled Meds:    sodium chloride 0.9%   Intravenous Once    amoxicillin-pot clavulanate 250-62.5 mg/5ml  500 mg Oral Daily    atovaquone  1,500 mg Oral Daily    cycloSPORINE  175 mg Oral BID    docusate sodium  100 mg Oral BID    dronabinol  2.5 mg Oral BID AC    epoetin maurisio (PROCRIT) injection  10,000 Units Intravenous Every Mon, Wed, Fri    heparin (porcine)  5,000 Units Subcutaneous Q8H    custom IVPB builder   Intravenous Q24H    levetiracetam oral soln  500 mg Oral BID    levothyroxine  75 mcg Oral Before breakfast    metoclopramide HCl  5 mg Oral QID (AC & HS)    omeprazole  40 mg Oral QAM    polyethylene glycol  17 g Oral BID    psyllium husk (aspartame)  3.4 g Oral BID    ursodiol  300 mg Oral TID    valganciclovir 50 mg/ml  200 mg Oral Every Mon, Wed, Fri     Continuous Infusions:   PRN Meds:sodium chloride, sodium chloride 0.9%, acetaminophen, albuterol-ipratropium 2.5mg-0.5mg/3mL, bisacodyl, heparin (porcine), omnipaque, ondansetron, ondansetron, oxyCODONE, prochlorperazine, simethicone, sodium chloride 0.9%, sodium chloride 0.9%  Anticoagulants/Antiplatelets: no  anticoagulation    Allergies:   Review of patient's allergies indicates:   Allergen Reactions    Bactrim [sulfamethoxazole-trimethoprim] Other (See Comments)     Mookie Trell Syndrome     Sedation Hx: have not been any systemic reactions    Labs:    Recent Labs  Lab 02/16/18 0436   INR 1.0       Recent Labs  Lab 02/16/18 0436   WBC 2.85*   HGB 8.8*   HCT 26.8*   MCV 92         Recent Labs  Lab 02/16/18 0436   GLU 84   *   K 4.0      CO2 23   BUN 23*   CREATININE 5.1*   CALCIUM 9.5   MG 1.8   ALT <5*   AST 25   ALBUMIN 2.0*   BILITOT 2.2*         Vitals:  Temp: 99.8 °F (37.7 °C) (02/16/18 0357)  Pulse: 85 (02/16/18 0357)  Resp: 18 (02/16/18 0357)  BP: 127/78 (02/16/18 0357)  SpO2: (!) 92 % (02/16/18 0357)     Physical Exam:  ASA: III  Mallampati: II    General: no acute distress  Mental Status: alert and oriented to person, place and time  HEENT: normocephalic, atraumatic  Chest: unlabored breathing  Abdomen: nondistended  Extremity: moves all extremities    Plan: Right thoracentesis  Sedation Plan: Local anesthesia    Ian Elizabeth MD  PGY-III  Dept of Radiology   Pager: [phone]

## 2018-02-16 NOTE — PROGRESS NOTES
Patient arrived via stretcher and weighed standing. 0828 Maintenance HD initiated to right IJ permcath without difficulty. Lines secured. Patient without complaint at this time. Monitoring

## 2018-02-16 NOTE — PROGRESS NOTES
Chest x-ray complete and read by Dr. Pacheco.  Pt to be transport back to patient room with patient escort.

## 2018-02-16 NOTE — PROGRESS NOTES
Pt arrived to  for Right thoracentesis.  Name verified using two identifiers.  Allergies verified.  Will continue to monitor.

## 2018-02-16 NOTE — PLAN OF CARE
Problem: Nutrition, Imbalanced: Inadequate Oral Intake (Adult)  Intervention: Promote/Optimize Nutrition  Recommendations    Recommendation/Intervention: New TF order placed today. This formula is to aid in motility and digestion. It contains more free fluid and is not formulated for renal patients. Will monitor tolerance.     Peptamen 1.5 with Prebio at goal rate of 50mL/hr from 6pm - 9am. Initiate at 10mL/hr and increase by 20mL q4hrs until goal of 50ml/hr is reached. Add 3 packets Beneprotein nightly with water flushes. This provides 1200kcal, 69g protein, and 578mL free fluid, 60% EEN and 70% EPN. Hold for residuals >500mL.     Oral intake remains poor at this time - 0% PO today and yesterday. If oral intake does not improve over the weekend and J Tube not to be placed, recommend modifying TF regimen to 24 hrs. This is not a renal formula - monitor renal electrolytes.     If this formula is not better tolerated, recommend switching back to Novasource.     Goals: Consume/tolerate > 85% EEN and EPN  Nutrition Goal Status: goal not met  Communication of EDYTA Recs: reviewed with RIMA Godwin

## 2018-02-16 NOTE — ASSESSMENT & PLAN NOTE
- CXR in ER with large right pleural effusion with subjective c/o worsening SOB  - thoracentesis completed- 1200 ml removed.  SOB with significant improvement.   - Cell count reviewed and negative for infection.   - resp even and non labored.  O2 sat 94-98%.  - Repeat thora 1/19 negative for infection per cell count.  - Reaccumulating fluid on xray 1/21.  - thoracentesis 1/31 - 1.4L removed, negative for infection  - Reaccumulating fluid again on xray 2/10.   -Thoracentesis performed today 2/16 with 1500 cc off, fluid analysis pending.

## 2018-02-16 NOTE — SUBJECTIVE & OBJECTIVE
Scheduled Meds:   amoxicillin-pot clavulanate 250-62.5 mg/5ml  500 mg Oral Daily    atovaquone  1,500 mg Oral Daily    cycloSPORINE  175 mg Oral BID    docusate sodium  100 mg Oral BID    dronabinol  2.5 mg Oral BID AC    epoetin maurisio (PROCRIT) injection  10,000 Units Intravenous Every Mon, Wed, Fri    heparin (porcine)  5,000 Units Subcutaneous Q8H    custom IVPB builder   Intravenous Q24H    levetiracetam oral soln  500 mg Oral BID    levothyroxine  75 mcg Oral Before breakfast    metoclopramide HCl  5 mg Oral QID (AC & HS)    omeprazole  40 mg Oral QAM    polyethylene glycol  17 g Oral BID    psyllium husk (aspartame)  3.4 g Oral BID    ursodiol  300 mg Oral TID    valganciclovir 50 mg/ml  200 mg Oral Every Mon, Wed, Fri     Continuous Infusions:  PRN Meds:sodium chloride, sodium chloride 0.9%, acetaminophen, albuterol-ipratropium 2.5mg-0.5mg/3mL, bisacodyl, heparin (porcine), omnipaque, ondansetron, ondansetron, oxyCODONE, prochlorperazine, simethicone, sodium chloride 0.9%, sodium chloride 0.9%    Review of Systems   Constitutional: Positive for activity change, appetite change, fatigue and fever. Negative for chills.   HENT: Negative.  Negative for congestion and facial swelling.    Eyes: Negative for pain, discharge and visual disturbance.   Respiratory: Negative for cough, chest tightness, shortness of breath and wheezing.    Cardiovascular: Negative for chest pain, palpitations and leg swelling.   Gastrointestinal: Positive for abdominal distention, abdominal pain and nausea. Negative for constipation and vomiting.   Endocrine: Negative.    Genitourinary: Positive for decreased urine volume. Negative for difficulty urinating.        Anuric   Musculoskeletal: Negative for arthralgias, back pain and myalgias.   Skin: Positive for color change and wound. Negative for rash.   Allergic/Immunologic: Positive for immunocompromised state.   Neurological: Negative for dizziness, seizures, weakness  and headaches.   Psychiatric/Behavioral: Positive for decreased concentration and dysphoric mood. Negative for confusion. The patient is not nervous/anxious.    All other systems reviewed and are negative.    Objective:     Vital Signs (Most Recent):  Temp: 98.4 °F (36.9 °C) (02/16/18 1136)  Pulse: 88 (02/16/18 1136)  Resp: 14 (02/16/18 1136)  BP: 133/78 (02/16/18 1136)  SpO2: 95 % (02/16/18 0748) Vital Signs (24h Range):  Temp:  [98.4 °F (36.9 °C)-99.9 °F (37.7 °C)] 98.4 °F (36.9 °C)  Pulse:  [83-95] 88  Resp:  [14-18] 14  SpO2:  [92 %-95 %] 95 %  BP: (125-141)/(68-87) 133/78     Weight: 66.5 kg (146 lb 9.7 oz)  Body mass index is 23.66 kg/m².    Intake/Output - Last 3 Shifts       02/14 0700 - 02/15 0659 02/15 0700 - 02/16 0659 02/16 0700 - 02/17 0659    P.O. 1180 180     I.V. (mL/kg)       Other 400  600    NG/      Total Intake(mL/kg) 2200 (33.1) 180 (2.7) 600 (9)    Urine (mL/kg/hr) 0 (0) 0 (0)     Emesis/NG output 0 (0)      Drains 120 (0.1)      Other 1000 (0.6)  3100 (5.5)    Stool 0 (0) 0 (0) 0 (0)    Blood       Total Output 1120 0 3100    Net +1080 +180 -2500           Urine Occurrence 0 x 0 x     Stool Occurrence 2 x 0 x 0 x    Emesis Occurrence 1 x            Physical Exam   Constitutional: He is oriented to person, place, and time. He appears well-developed. No distress.   Temporal and distal extremity muscle wasting   HENT:   Head: Normocephalic and atraumatic.   Mouth/Throat: No oropharyngeal exudate.   dylan tube   Eyes: EOM are normal. Pupils are equal, round, and reactive to light. Scleral icterus is present.   Neck: Normal range of motion. Neck supple. No JVD present. No thyromegaly present.   Cardiovascular: Normal rate, regular rhythm, normal heart sounds and intact distal pulses.    No murmur heard.  Pulmonary/Chest: Effort normal. No respiratory distress. He has decreased breath sounds in the right middle field, the right lower field and the left lower field. He has no wheezes. He  exhibits no tenderness.   Diminished to RLL   Abdominal: Soft. Bowel sounds are normal. He exhibits ascites. He exhibits no distension. There is tenderness (mild). There is no rebound and no guarding.   Dressing to chevron.  Wd vac removed 1/12/18  Dependent edema present R flank; surrounding erythema   Musculoskeletal: Normal range of motion. He exhibits edema (2+ LE edema). He exhibits no tenderness.   Neurological: He is alert and oriented to person, place, and time. He has normal reflexes.   Skin: Skin is warm and dry. Capillary refill takes 2 to 3 seconds. He is not diaphoretic. No erythema.   jaundice   Psychiatric: He has a normal mood and affect. His behavior is normal. Judgment and thought content normal. His mood appears not anxious.   Depressed mood   Nursing note and vitals reviewed.      Laboratory:  Immunosuppressants         Stop Route Frequency     cycloSPORINE (NEORAL) 100 mg/mL microemulsion solution 175 mg      -- Oral 2 times daily        CBC:     Recent Labs  Lab 02/16/18 0436   WBC 2.85*   RBC 2.90*   HGB 8.8*   HCT 26.8*      MCV 92   MCH 30.3   MCHC 32.8     CMP:     Recent Labs  Lab 02/16/18 0436   GLU 84   CALCIUM 9.5   ALBUMIN 2.0*   PROT 5.6*   *   K 4.0   CO2 23      BUN 23*   CREATININE 5.1*   ALKPHOS 125   ALT <5*   AST 25   BILITOT 2.2*     Coagulation:     Recent Labs  Lab 02/16/18 0436   INR 1.0     Labs within the past 24 hours have been reviewed.    Diagnostic Results:  None

## 2018-02-16 NOTE — PROGRESS NOTES
Right Thoracentesis complete. 1500 mLs of fluid drained. Pt tolerated well. Dressing to right back clean, dry, and intact. Specimens sent per lab order. Report called to floor nurse, SISSY Lucio

## 2018-02-16 NOTE — PLAN OF CARE
Problem: Patient Care Overview  Goal: Plan of Care Review  Outcome: Ongoing (interventions implemented as appropriate)  Pt AAO X 4, mother at the bedside. VSS, see flowsheet for further assessment data.     HD scheduled for 2/16.     Thoracentesis scheduled for the afternoon on 2/16 due to newly dx R pleural effusion.     Infection control in progress; Cresemba administered as ordered, standard precautions maintained. Tmax 99.8. Bld Cx collected 2/15 during AM shift; results pending.     I/Os monitored. PO intake encouraged; Marinol given. Tube feeds administered overnight at 30mls/hr, goal is 55mls/hr; R nare Alex tube remains patent. GI to possibly place GJ tube on Friday/Monday.    No UO, no Bm, and no n/v overnight.      Pt up with standby assist; no new skin breakdown noted. Chevron dressing remains CDI.     Fall precautions in place; pt remains free of injury at this time. Daily labs monitored. No acute events overnight.

## 2018-02-16 NOTE — PLAN OF CARE
Problem: Patient Care Overview  Goal: Plan of Care Review  Outcome: Ongoing (interventions implemented as appropriate)  Pt had HD this AM with 1L removed. Thoracentesis performed in IR after HD with 1.5L removed. Pt up walking on unit after thora. Tube feed changed to new formula and feed lengthened to 6p - 9a. Titrations of 20ml q4h to goal of 50ml/hr. Pt free from falls and injury throughout shift. Pain well controlled with oral pain medicine regimen.

## 2018-02-16 NOTE — ASSESSMENT & PLAN NOTE
- Started on vanc/cefepime for ERCP. Now transitioned to vanc/zosyn.  - Blood cultures sent, prelim NGTD. Repeat blood cultures and chest xray today 2/10.  - thoracentesis 2/1 - fluid negative for infection.  - atelectasis noted on ct scan - start breathing tx & CPT.  - now cmv positive - started treatment 2/5/18.  - taken to OR 2/7 for exp lap - fluid collections drained, cultures pending.  - Patient with low grade fever 2/15, 100.3.  -CT A/P 2/15 with no evidence of IA fluid infection. Did show R pleural effusion s/p thoracentesis, cell count pending  -Repeat blood cx 2/15 prelim NGTD  -Cont oral antibiotic for now unless spikes another fever.

## 2018-02-17 LAB
ALBUMIN SERPL BCP-MCNC: 1.8 G/DL
ALP SERPL-CCNC: 111 U/L
ALT SERPL W/O P-5'-P-CCNC: <5 U/L
ANION GAP SERPL CALC-SCNC: 9 MMOL/L
ANISOCYTOSIS BLD QL SMEAR: SLIGHT
AST SERPL-CCNC: 18 U/L
BASOPHILS # BLD AUTO: 0.03 K/UL
BASOPHILS NFR BLD: 1.3 %
BILIRUB SERPL-MCNC: 2.1 MG/DL
BUN SERPL-MCNC: 13 MG/DL
CALCIUM SERPL-MCNC: 8.8 MG/DL
CHLORIDE SERPL-SCNC: 105 MMOL/L
CO2 SERPL-SCNC: 23 MMOL/L
CREAT SERPL-MCNC: 3.5 MG/DL
CYCLOSPORINE BLD LC/MS/MS-MCNC: 152 NG/ML
DIFFERENTIAL METHOD: ABNORMAL
EOSINOPHIL # BLD AUTO: 0.1 K/UL
EOSINOPHIL NFR BLD: 4 %
ERYTHROCYTE [DISTWIDTH] IN BLOOD BY AUTOMATED COUNT: 14.9 %
EST. GFR  (AFRICAN AMERICAN): 25.9 ML/MIN/1.73 M^2
EST. GFR  (NON AFRICAN AMERICAN): 22.4 ML/MIN/1.73 M^2
GLUCOSE SERPL-MCNC: 81 MG/DL
HCT VFR BLD AUTO: 23.9 %
HGB BLD-MCNC: 7.8 G/DL
HYPOCHROMIA BLD QL SMEAR: ABNORMAL
IMM GRANULOCYTES # BLD AUTO: 0.07 K/UL
IMM GRANULOCYTES NFR BLD AUTO: 3.1 %
LYMPHOCYTES # BLD AUTO: 0.9 K/UL
LYMPHOCYTES NFR BLD: 38.1 %
MAGNESIUM SERPL-MCNC: 1.6 MG/DL
MCH RBC QN AUTO: 31.2 PG
MCHC RBC AUTO-ENTMCNC: 32.6 G/DL
MCV RBC AUTO: 96 FL
MONOCYTES # BLD AUTO: 0.3 K/UL
MONOCYTES NFR BLD: 13.5 %
NEUTROPHILS # BLD AUTO: 0.9 K/UL
NEUTROPHILS NFR BLD: 40 %
NRBC BLD-RTO: 0 /100 WBC
OVALOCYTES BLD QL SMEAR: ABNORMAL
PHOSPHATE SERPL-MCNC: 2.5 MG/DL
PLATELET # BLD AUTO: 134 K/UL
PLATELET BLD QL SMEAR: ABNORMAL
PMV BLD AUTO: 10.6 FL
POIKILOCYTOSIS BLD QL SMEAR: SLIGHT
POLYCHROMASIA BLD QL SMEAR: ABNORMAL
POTASSIUM SERPL-SCNC: 3.8 MMOL/L
PROT SERPL-MCNC: 5.1 G/DL
RBC # BLD AUTO: 2.5 M/UL
SODIUM SERPL-SCNC: 137 MMOL/L
WBC # BLD AUTO: 2.23 K/UL

## 2018-02-17 PROCEDURE — 86709 HEPATITIS A IGM ANTIBODY: CPT

## 2018-02-17 PROCEDURE — 99900035 HC TECH TIME PER 15 MIN (STAT)

## 2018-02-17 PROCEDURE — 63600175 PHARM REV CODE 636 W HCPCS: Performed by: PHYSICIAN ASSISTANT

## 2018-02-17 PROCEDURE — 20600001 HC STEP DOWN PRIVATE ROOM

## 2018-02-17 PROCEDURE — 99233 SBSQ HOSP IP/OBS HIGH 50: CPT | Mod: ,,, | Performed by: INTERNAL MEDICINE

## 2018-02-17 PROCEDURE — 63600175 PHARM REV CODE 636 W HCPCS: Performed by: NURSE PRACTITIONER

## 2018-02-17 PROCEDURE — 80053 COMPREHEN METABOLIC PANEL: CPT

## 2018-02-17 PROCEDURE — 25000003 PHARM REV CODE 250: Performed by: NURSE PRACTITIONER

## 2018-02-17 PROCEDURE — 85025 COMPLETE CBC W/AUTO DIFF WBC: CPT

## 2018-02-17 PROCEDURE — 25000003 PHARM REV CODE 250: Performed by: PHYSICIAN ASSISTANT

## 2018-02-17 PROCEDURE — 86706 HEP B SURFACE ANTIBODY: CPT

## 2018-02-17 PROCEDURE — 80158 DRUG ASSAY CYCLOSPORINE: CPT

## 2018-02-17 PROCEDURE — 83735 ASSAY OF MAGNESIUM: CPT

## 2018-02-17 PROCEDURE — 94761 N-INVAS EAR/PLS OXIMETRY MLT: CPT

## 2018-02-17 PROCEDURE — 36415 COLL VENOUS BLD VENIPUNCTURE: CPT

## 2018-02-17 PROCEDURE — 87340 HEPATITIS B SURFACE AG IA: CPT

## 2018-02-17 PROCEDURE — 84100 ASSAY OF PHOSPHORUS: CPT

## 2018-02-17 PROCEDURE — 25000003 PHARM REV CODE 250: Performed by: SURGERY

## 2018-02-17 PROCEDURE — 99233 SBSQ HOSP IP/OBS HIGH 50: CPT | Mod: 24,,, | Performed by: PHYSICIAN ASSISTANT

## 2018-02-17 PROCEDURE — 63600175 PHARM REV CODE 636 W HCPCS: Mod: JG | Performed by: SURGERY

## 2018-02-17 PROCEDURE — 86704 HEP B CORE ANTIBODY TOTAL: CPT

## 2018-02-17 RX ADMIN — OXYCODONE HYDROCHLORIDE 5 MG: 5 TABLET ORAL at 07:02

## 2018-02-17 RX ADMIN — METOCLOPRAMIDE HYDROCHLORIDE 5 MG: 5 SOLUTION ORAL at 10:02

## 2018-02-17 RX ADMIN — TBO-FILGRASTIM 300 MCG: 300 INJECTION, SOLUTION SUBCUTANEOUS at 02:02

## 2018-02-17 RX ADMIN — OMEPRAZOLE 40 MG: 40 CAPSULE, DELAYED RELEASE ORAL at 06:02

## 2018-02-17 RX ADMIN — CYCLOSPORINE 175 MG: 100 SOLUTION ORAL at 08:02

## 2018-02-17 RX ADMIN — URSODIOL 300 MG: 300 CAPSULE ORAL at 02:02

## 2018-02-17 RX ADMIN — PIPERACILLIN AND TAZOBACTAM 4.5 G: 4; .5 INJECTION, POWDER, LYOPHILIZED, FOR SOLUTION INTRAVENOUS; PARENTERAL at 08:02

## 2018-02-17 RX ADMIN — METOCLOPRAMIDE HYDROCHLORIDE 5 MG: 5 SOLUTION ORAL at 08:02

## 2018-02-17 RX ADMIN — METOCLOPRAMIDE HYDROCHLORIDE 5 MG: 5 SOLUTION ORAL at 03:02

## 2018-02-17 RX ADMIN — METOCLOPRAMIDE HYDROCHLORIDE 5 MG: 5 SOLUTION ORAL at 06:02

## 2018-02-17 RX ADMIN — OXYCODONE HYDROCHLORIDE 5 MG: 5 TABLET ORAL at 02:02

## 2018-02-17 RX ADMIN — LEVOTHYROXINE SODIUM 75 MCG: 75 TABLET ORAL at 06:02

## 2018-02-17 RX ADMIN — LEVETIRACETAM 500 MG: 100 SOLUTION ORAL at 08:02

## 2018-02-17 RX ADMIN — ATOVAQUONE 1500 MG: 750 SUSPENSION ORAL at 08:02

## 2018-02-17 RX ADMIN — CYCLOSPORINE 175 MG: 100 SOLUTION ORAL at 05:02

## 2018-02-17 RX ADMIN — URSODIOL 300 MG: 300 CAPSULE ORAL at 08:02

## 2018-02-17 RX ADMIN — OXYCODONE HYDROCHLORIDE 5 MG: 5 TABLET ORAL at 12:02

## 2018-02-17 RX ADMIN — PIPERACILLIN AND TAZOBACTAM 4.5 G: 4; .5 INJECTION, POWDER, LYOPHILIZED, FOR SOLUTION INTRAVENOUS; PARENTERAL at 07:02

## 2018-02-17 RX ADMIN — SODIUM CHLORIDE: 9 INJECTION, SOLUTION INTRAVENOUS at 12:02

## 2018-02-17 RX ADMIN — URSODIOL 300 MG: 300 CAPSULE ORAL at 06:02

## 2018-02-17 RX ADMIN — VANCOMYCIN 1000 MG: 1 INJECTION, SOLUTION INTRAVENOUS at 09:02

## 2018-02-17 RX ADMIN — DRONABINOL 2.5 MG: 2.5 CAPSULE ORAL at 06:02

## 2018-02-17 RX ADMIN — ACETAMINOPHEN 650 MG: 325 TABLET, FILM COATED ORAL at 08:02

## 2018-02-17 NOTE — ASSESSMENT & PLAN NOTE
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, CMV D+/R+, steroid induction, on maintenance tacro/MMF/pred; c/b seizures, LAURA, superificial wound infection s/p wound vac to [facility] through 1/12, and several recent admissions on 11/24 and 12/3 with culture negative peritonitis with peak WBC ~5k due to suspected indolent biliary leakage s/p sphincterotomy/biliary stent placement over CBD stricture on 12/6 ERCP although notably with no overt leak noted during procedure) who was admitted on 1/11/2017 with acute onset fevers and acute on chronic N/V/abdominal pain (RLQ worst) in the setting of persistent noninflammatory ascites of unknown etiology. He underwent repeated drainage of pleural fluid and ascites that did not show evidence of gross infection and given that fevers were antibiotic responsive and walled off biloma was suspected, he underwent ex-lap on 2/8 where a walled off presumed partly bilious collection was noted in the RLQ and debrided (cultures negative). He was tapered to oral antibiotics after his procedure given negative cultures but has spiked a temperature again with a similar constellation of symptoms arguing for a similar cause. He remains tenuous.    - may continue empiric vanc/zosyn for now  - additional prophylaxis including isavuconazole per protocol (of note, 2 CMV quants negative and may transition to ppx dose valcyte again)  - await pending blood/pleural fluid cultures but negative so far (pleural fluid noninflammatory)  - as CT does not show additional obvious pathology and given prior history and persistently abnormal bilirubin, agree with repeat examination of his biliary system by ERCP -- appreciate assistance of KORIN

## 2018-02-17 NOTE — ASSESSMENT & PLAN NOTE
- CXR in ER with large right pleural effusion with subjective c/o worsening SOB  - thoracentesis completed- 1200 ml removed.  SOB with significant improvement.   - Cell count reviewed and negative for infection.   - resp even and non labored.  O2 sat 94-98%.  - Repeat thora 1/19 negative for infection per cell count.  - Reaccumulating fluid on xray 1/21.  - thoracentesis 1/31 - 1.4L removed, negative for infection  - Reaccumulating fluid again on xray 2/10.   -Thoracentesis performed  2/16 with 1500 cc off, cell count neg for infection, cx pending.

## 2018-02-17 NOTE — PROGRESS NOTES
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+, steroid induction, on maintenance tacro/MMF/pred; c/b seizures, LAURA, superificial wound infection s/p wound vac to [facility] through 1/12, and several recent admissions on 11/24 and 12/3 with culture negative peritonitis with peak WBC ~5k due to suspected indolent biliary leakage s/p sphincterotomy/biliary stent placement over CBD stricture on 12/6 ERCP although notably with no overt leak noted during procedure) who was admitted on 1/11/2017 with acute onset fevers and acute on chronic N/V/abdominal pain (RLQ worst) in the setting of persistent noninflammatory ascites of unknown etiology. He underwent repeated drainage of pleural fluid and ascites that did not show evidence of gross infection and given that fevers were antibiotic responsive and walled off biloma was suspected, he underwent ex-lap on 2/8 where a walled off presumed partly bilious collection was noted in the RLQ and debrided (cultures negative). He was tapered to oral antibiotics after his procedure given negative cultures but has spiked a temperature again with a similar constellation of symptoms arguing for a similar cause. He remains tenuous.    - may continue empiric vanc/zosyn for now  - additional prophylaxis including isavuconazole per protocol (of note, 2 CMV quants negative and may transition to ppx dose valcyte again)  - await pending blood/pleural fluid cultures but negative so far (pleural fluid noninflammatory)  - as CT does not show additional obvious pathology and given prior history and persistently abnormal  bilirubin, agree with repeat examination of his biliary system by ERCP -- appreciate assistance of PBS              Anticipated Disposition: pending improvement    Thank you for your consult. I will follow-up with patient. Please contact us if you have any additional questions.     Melanie Bergman MD  Transplant ID Attending  [id number]    Melanie Bergman MD  Infectious Disease  [facility]    Subjective:     Principal Problem:Fever    HPI: No notes on file  Interval History: ID was called back to see the patient for recurrent fevers on oral antibiotics. He is now POD 10 following most recent ex-lap on 2/7 where a walled off inferior perihepatic turbid/bilious collection was debrided in the upper RLQ. He notes slow decline since his surgery with similar symptoms as before with fatigue, nausea, and abdominal pain that is diffuse but worst centrally. His drains have been removed from his surgery. He otherwise denies HA, sinus congestion, oral ulcers, cough, SOB, diarrhea, dysuria (no UOP on iHD), rash, or line tenderness. ID workup to date for most recent fever includes repeat CT that showed a pleural effusion (now drained, bland fluid), minimal post-op fluid as expected, and no other acute findings to explain his fevers; negative blood and pleural fluid cx to date; and negative CMV quants x2 since prior minimal reactivation. He is afebrile so far today on IV antibiotics. Of note, his bilirubin has still failed to normalize despite prior interventions. Last ERCP on 1/30/2018 was reviewed which choledocholithiasis and stone was removed by balloon extraction (no stricture was noted but 2 stents were placed in the CBD given quick repeat occurence of stone/sludge since the prior ERCP).      Review of Systems   Constitutional: Positive for activity change, appetite change, fatigue and fever. Negative for chills.   HENT: Negative.  Negative for congestion and facial swelling.    Eyes: Negative for pain,  discharge and visual disturbance.   Respiratory: Negative for cough, chest tightness, shortness of breath and wheezing.    Cardiovascular: Negative for chest pain, palpitations and leg swelling.   Gastrointestinal: Positive for abdominal distention, abdominal pain and nausea. Negative for constipation and vomiting.   Endocrine: Negative.    Genitourinary: Positive for decreased urine volume. Negative for difficulty urinating.        Anuric   Musculoskeletal: Negative for arthralgias, back pain and myalgias.   Skin: Positive for color change and wound. Negative for rash.   Allergic/Immunologic: Positive for immunocompromised state.   Neurological: Negative for dizziness, seizures, weakness and headaches.   Psychiatric/Behavioral: Positive for decreased concentration and dysphoric mood. Negative for confusion. The patient is not nervous/anxious.    All other systems reviewed and are negative.      Objective:     Vital Signs (Most Recent):  Temp: 99.2 °F (37.3 °C) (02/17/18 1542)  Pulse: 90 (02/17/18 1542)  Resp: 16 (02/17/18 1542)  BP: 135/85 (02/17/18 1542)  SpO2: 95 % (02/17/18 1542) Vital Signs (24h Range):  Temp:  [98.6 °F (37 °C)-101.2 °F (38.4 °C)] 99.2 °F (37.3 °C)  Pulse:  [84-92] 90  Resp:  [16-20] 16  SpO2:  [92 %-96 %] 95 %  BP: (128-137)/(75-85) 135/85     Weight: 60.2 kg (132 lb 11.5 oz)  Body mass index is 21.42 kg/m².    Estimated Creatinine Clearance: 26.8 mL/min (A) (based on SCr of 3.5 mg/dL (H)).    Physical Exam   Constitutional: He is oriented to person, place, and time. He appears well-developed. No distress.   Temporal and distal extremity muscle wasting   HENT:   Head: Normocephalic and atraumatic.   Mouth/Throat: No oropharyngeal exudate.   dylan tube   Eyes: EOM are normal. Pupils are equal, round, and reactive to light. No scleral icterus.   Neck: Normal range of motion. Neck supple. No JVD present. No thyromegaly present.   Cardiovascular: Normal rate, regular rhythm, normal heart sounds and  intact distal pulses.    No murmur heard.  Pulmonary/Chest: Effort normal. No respiratory distress. He has no wheezes. He exhibits no tenderness.   Diminished to RLL   Abdominal: Soft. Bowel sounds are normal. He exhibits ascites. He exhibits no distension. There is tenderness. There is no rebound and no guarding.   Dressing to chevron.  Wd vac removed 1/12/18  Dependent edema present R flank; surrounding erythema   Musculoskeletal: Normal range of motion. He exhibits no tenderness. Edema: 2+ LE edema.   Neurological: He is alert and oriented to person, place, and time. He has normal reflexes.   Skin: Skin is warm and dry. He is not diaphoretic. No erythema.   Psychiatric: His mood appears not anxious.   Nursing note and vitals reviewed.      Significant Labs:   CBC:     Recent Labs  Lab 02/16/18  0436 02/17/18  0343   WBC 2.85* 2.23*   HGB 8.8* 7.8*   HCT 26.8* 23.9*    134*     CMP:     Recent Labs  Lab 02/16/18 0436 02/17/18  0343   * 137   K 4.0 3.8    105   CO2 23 23   GLU 84 81   BUN 23* 13   CREATININE 5.1* 3.5*   CALCIUM 9.5 8.8   PROT 5.6* 5.1*   ALBUMIN 2.0* 1.8*   BILITOT 2.2* 2.1*   ALKPHOS 125 111   AST 25 18   ALT <5* <5*   ANIONGAP 11 9   EGFRNONAA 14.2* 22.4*       Significant Imaging: I have reviewed all pertinent imaging results/findings within the past 24 hours.     CT CAP:   Post surgical changes prior hepatic transplant.  Percutaneous surgical drain with notable decrease in size of fluid collection in lower abdomen.  Small amount of fluid tracking elsewhere within the abdomen and pelvis.  Large volume right pleural fluid, increased in size since prior exam, with associated right lower lobe atelectasis.  Diffuse body wall edema.  Small pericardial effusion.    Microbiology:  1/11 blood cx: negative  1/11 pleural fluid cx: negative  1/11 ascites cx: negative  1/17 blood cx: negative  1/18 blood cx: negative  1/19 pleural fluid cx: NGTD  1/19 ascites cx: NGTD  2/1 RVP  negative  2/7 OR cx: NGTD  2/11 blood cx: negative  2/15 blood cx: negative  2/16 pleural fluid cx: negative

## 2018-02-17 NOTE — ASSESSMENT & PLAN NOTE
- Post op course complicated by fevers and hyperbilirubinemia.  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  - AST/ALT normal. Tbili/alkphos remain elevated.  - Liver US 1/8 showed 3.9 cm complex fluid collection anterior to right lobe and moderate nonspecific complex ascites inferior to transplant.   - IR placed drain 1/11/18, cell count negative for infection.  - PBS consulted. PBS not comfortable proceeding with EUS with liver biopsy given fever and possible infectious process going on. ERCP 1/17 with stone and sludge. Pt placed on Actigall - treated with 10 day course of abx.  ID now signed off.  - Bili elevated - liver u/s 1/28  - Liver biopsy 1/23 without rejection.  - consulted PBS for ERCP - Choledocholithiasis was found along with sludge, stents placed 1/30.  - T bili remains elevated. Will consult PBS for possible ERCP in light of fevers, elevated bili, and abdominal pain.

## 2018-02-17 NOTE — ASSESSMENT & PLAN NOTE
- Poor PO intake since transplant requiring short course of TPN during previous hospital stay.   - albumin remains decreased but appetite slowly improving per pt.   - Dietary consulted. Will need to closely monitor PO intake.   - supplements also ordered.   - SHANE with tube feeding start 1/16. Stopped 1/17/18, was not tolerating. Shane removed 1/20.  - prealbumin 7 on 1/15.  - TPN started 1/16/18.   - Pt able to eat minimally 1/21 and 1/22 which is an improvement.  - gi consulted - EGD reviewed, increased ppi bid, miralax bid, and metamucil bid, decreased narcotics.  - continue reglan, placed SHANE tube with tube feeds - transitioned to nightly feeds. Not tolerating well due to nausea.   - small bowel follow through with normal findings.   - consulted gi for possible gj tube placement  - Marinol started with some improvement in appetite seen.   -Will monitor through the weekend with possible plan for gj tube next week.

## 2018-02-17 NOTE — SUBJECTIVE & OBJECTIVE
Scheduled Meds:   atovaquone  1,500 mg Oral Daily    cycloSPORINE  175 mg Oral BID    docusate sodium  100 mg Oral BID    epoetin maurisio (PROCRIT) injection  10,000 Units Intravenous Every Mon, Wed, Fri    heparin (porcine)  5,000 Units Subcutaneous Q8H    custom IVPB builder   Intravenous Q24H    levetiracetam oral soln  500 mg Oral BID    levothyroxine  75 mcg Oral Before breakfast    metoclopramide HCl  5 mg Oral QID (AC & HS)    omeprazole  40 mg Oral QAM    piperacillin-tazobactam (ZOSYN) IVPB  4.5 g Intravenous Q12H    polyethylene glycol  17 g Oral BID    psyllium husk (aspartame)  3.4 g Oral BID    ursodiol  300 mg Oral TID    valganciclovir 50 mg/ml  200 mg Oral Every Mon, Wed, Fri    vancomycin (VANCOCIN) IVPB  1,000 mg Intravenous Once     Continuous Infusions:  PRN Meds:sodium chloride, sodium chloride 0.9%, acetaminophen, albuterol-ipratropium 2.5mg-0.5mg/3mL, bisacodyl, heparin (porcine), omnipaque, ondansetron, ondansetron, oxyCODONE, prochlorperazine, simethicone, sodium chloride 0.9%, sodium chloride 0.9%    Review of Systems   Constitutional: Positive for activity change, appetite change, fatigue and fever. Negative for chills.   HENT: Negative.  Negative for congestion and facial swelling.    Eyes: Negative for pain, discharge and visual disturbance.   Respiratory: Negative for cough, chest tightness, shortness of breath and wheezing.    Cardiovascular: Negative for chest pain, palpitations and leg swelling.   Gastrointestinal: Positive for abdominal distention, abdominal pain and nausea. Negative for constipation and vomiting.   Endocrine: Negative.    Genitourinary: Positive for decreased urine volume. Negative for difficulty urinating.        Anuric   Musculoskeletal: Negative for arthralgias, back pain and myalgias.   Skin: Positive for color change and wound. Negative for rash.   Allergic/Immunologic: Positive for immunocompromised state.   Neurological: Negative for dizziness,  seizures, weakness and headaches.   Psychiatric/Behavioral: Positive for decreased concentration and dysphoric mood. Negative for confusion. The patient is not nervous/anxious.    All other systems reviewed and are negative.    Objective:     Vital Signs (Most Recent):  Temp: 99.4 °F (37.4 °C) (02/17/18 0721)  Pulse: 87 (02/17/18 0721)  Resp: 20 (02/17/18 0510)  BP: 128/79 (02/17/18 0721)  SpO2: (!) 94 % (02/17/18 0721) Vital Signs (24h Range):  Temp:  [98.4 °F (36.9 °C)-101.2 °F (38.4 °C)] 99.4 °F (37.4 °C)  Pulse:  [84-92] 87  Resp:  [14-20] 20  SpO2:  [94 %-96 %] 94 %  BP: (125-135)/(75-81) 128/79     Weight: 60.2 kg (132 lb 11.5 oz)  Body mass index is 21.42 kg/m².    Intake/Output - Last 3 Shifts       02/15 0700 - 02/16 0659 02/16 0700 - 02/17 0659 02/17 0700 - 02/18 0659    P.O. 180 240     Other  600     NG/GT       Total Intake(mL/kg) 180 (2.7) 840 (14)     Urine (mL/kg/hr) 0 (0) 0 (0)     Emesis/NG output  0 (0)     Drains       Other  3100 (2.1)     Stool 0 (0) 0 (0)     Blood  0 (0)     Total Output 0 3100      Net +180 -2260             Urine Occurrence 0 x 0 x     Stool Occurrence 0 x 0 x     Emesis Occurrence  0 x           Physical Exam   Constitutional: He is oriented to person, place, and time. He appears well-developed. No distress.   Temporal and distal extremity muscle wasting   HENT:   Head: Normocephalic and atraumatic.   Mouth/Throat: No oropharyngeal exudate.   dylan tube   Eyes: EOM are normal. Pupils are equal, round, and reactive to light. Scleral icterus is present.   Neck: Normal range of motion. Neck supple. No JVD present. No thyromegaly present.   Cardiovascular: Normal rate, regular rhythm, normal heart sounds and intact distal pulses.    No murmur heard.  Pulmonary/Chest: Effort normal. No respiratory distress. He has decreased breath sounds in the right middle field, the right lower field and the left lower field. He has no wheezes. He exhibits no tenderness.   Diminished to RLL    Abdominal: Soft. Bowel sounds are normal. He exhibits ascites. He exhibits no distension. There is tenderness. There is no rebound and no guarding.   Dressing to chevron.  Wd vac removed 1/12/18  Dependent edema present R flank; surrounding erythema   Musculoskeletal: Normal range of motion. He exhibits edema (2+ LE edema). He exhibits no tenderness.   Neurological: He is alert and oriented to person, place, and time. He has normal reflexes.   Skin: Skin is warm and dry. Capillary refill takes 2 to 3 seconds. He is not diaphoretic. No erythema.   jaundice   Psychiatric: He has a normal mood and affect. His behavior is normal. Judgment and thought content normal. His mood appears not anxious.   Depressed mood   Nursing note and vitals reviewed.      Laboratory:  Immunosuppressants         Stop Route Frequency     cycloSPORINE (NEORAL) 100 mg/mL microemulsion solution 175 mg      -- Oral 2 times daily        CBC:     Recent Labs  Lab 02/17/18  0343   WBC 2.23*   RBC 2.50*   HGB 7.8*   HCT 23.9*   *   MCV 96   MCH 31.2*   MCHC 32.6     CMP:     Recent Labs  Lab 02/17/18  0343   GLU 81   CALCIUM 8.8   ALBUMIN 1.8*   PROT 5.1*      K 3.8   CO2 23      BUN 13   CREATININE 3.5*   ALKPHOS 111   ALT <5*   AST 18   BILITOT 2.1*     Coagulation:     Recent Labs  Lab 02/16/18  0436   INR 1.0     Labs within the past 24 hours have been reviewed.    Diagnostic Results:  None

## 2018-02-17 NOTE — SUBJECTIVE & OBJECTIVE
Subjective:     Interval History:     AES reconsulted for fevers and concern for biliary infection  Pt states had thoracentesis yesterday.  Also RLQ drain recently removed.  He has infraumbilical pain ,no RUQ pain.        Reviewed recent ERCP 1/30/18  Impression:           - One stent from the biliary tree was seen in the                         major papilla.                        - One stent was removed from the biliary tree.                        - Choledocholithiasis was found. Complete removal                         was accomplished by balloon extraction. No                         cholangiographic evidence for stricture at                         anastomosis.                        - Two plastic stents were placed into the common                         bile duct (given quick repeat occurence of                         stone/sludge since last ERCP, stents placed to                         optimize drainage should this occur again).    Review of Systems   Constitutional: Positive for chills, fatigue and fever.   Respiratory: Negative for chest tightness and shortness of breath.    Gastrointestinal: Negative for abdominal distention and abdominal pain.   Skin: Positive for color change and pallor.   Neurological: Positive for weakness.     Objective:     Vital Signs (Most Recent):  Temp: 99.4 °F (37.4 °C) (02/17/18 0721)  Pulse: 87 (02/17/18 0721)  Resp: 20 (02/17/18 0510)  BP: 128/79 (02/17/18 0721)  SpO2: (!) 94 % (02/17/18 0721) Vital Signs (24h Range):  Temp:  [98.5 °F (36.9 °C)-101.2 °F (38.4 °C)] 99.4 °F (37.4 °C)  Pulse:  [84-92] 87  Resp:  [16-20] 20  SpO2:  [94 %-96 %] 94 %  BP: (125-134)/(75-81) 128/79     Weight: 60.2 kg (132 lb 11.5 oz) (02/17/18 0600)  Body mass index is 21.42 kg/m².      Intake/Output Summary (Last 24 hours) at 02/17/18 1145  Last data filed at 02/16/18 1644   Gross per 24 hour   Intake              240 ml   Output             1500 ml   Net            -1260 ml        Lines/Drains/Airways     Central Venous Catheter Line                 Hemodialysis Catheter right internal jugular -- days         Trialysis (Dialysis) Catheter right internal jugular -- days          Drain                 Trans Pyloric Feeding Tube 01/26/18 nasogastric 22 days          Peripheral Intravenous Line                 Midline Catheter Insertion/Assessment  - Single Lumen 02/06/18 0917 Left brachial vein 18g x 8cm 11 days                Physical Exam   Constitutional: He is oriented to person, place, and time. He appears well-developed. No distress.   HENT:   Head: Normocephalic and atraumatic.   Eyes: Conjunctivae are normal.   Neck: Trachea normal.   Cardiovascular: Normal rate, regular rhythm, S1 normal, S2 normal and normal pulses.    Pulmonary/Chest: Effort normal and breath sounds normal.   Abdominal: Soft. Bowel sounds are normal. He exhibits distension.   Well-healed transverse abdominal scar ;interval removal of drain in RLQ ; TTP in the LLQ and infraumbilical are; no RUQ tenderness.   Musculoskeletal: Normal range of motion. He exhibits edema (1+ pitting edema).   Neurological: He is alert and oriented to person, place, and time.   Skin: Skin is warm and dry.   Vitals reviewed.      Significant Labs:  Blood Culture:   Recent Labs  Lab 02/15/18  1729   LABBLOO No Growth to date  No Growth to date  No Growth to date  No Growth to date     CBC:   Recent Labs  Lab 02/16/18  0436 02/17/18  0343   WBC 2.85* 2.23*   HGB 8.8* 7.8*   HCT 26.8* 23.9*    134*     CMP:   Recent Labs  Lab 02/17/18  0343   GLU 81   CALCIUM 8.8   ALBUMIN 1.8*   PROT 5.1*      K 3.8   CO2 23      BUN 13   CREATININE 3.5*   ALKPHOS 111   ALT <5*   AST 18   BILITOT 2.1*     Coagulation:   Recent Labs  Lab 02/16/18  0436   INR 1.0     CRP: No results for input(s): CRP in the last 48 hours.  ESR: No results for input(s): SEDRATE in the last 48 hours.  H.Pylori Ab IgG: No results for input(s): HPYLORIIGG  in the last 48 hours.  Lipase: No results for input(s): LIPASE in the last 48 hours.  Stool C. diff: No results for input(s): CDIFFICILEAN, CDIFFTOX in the last 48 hours.  Stool Culture: No results for input(s): STOOLCULTURE in the last 48 hours.  Stool Ova/Cysts/Parasites: No results for input(s): STLEXAMOCP in the last 48 hours.  Stool Giardia/Crypto: No results for input(s): GIARDIAANTIG, CRSPAG in the last 48 hours.  Stool WBCs: No results for input(s): STOOLWBC in the last 48 hours.  Stool Lactoferrin: No results for input(s): LACTOFERRINS in the last 48 hours.      Significant Imaging:  Imaging results within the past 24 hours have been reviewed.

## 2018-02-17 NOTE — ASSESSMENT & PLAN NOTE
- Started on vanc/cefepime for ERCP. Now transitioned to vanc/zosyn.  - Blood cultures sent, prelim NGTD. Repeat blood cultures and chest xray today 2/10.  - thoracentesis 2/1 - fluid negative for infection.  - atelectasis noted on ct scan - start breathing tx & CPT.  - now cmv positive - started treatment 2/5/18.  - taken to OR 2/7 for exp lap - fluid collections drained, cultures pending.  - Patient with low grade fever 2/15, 100.3.  -CT A/P 2/15 with no evidence of IA fluid infection. Did show R pleural effusion s/p thoracentesis, cell count negative for infection, cx pending.  -Repeat blood cx 2/15 prelim NGTD, CMV PCR 2/15 negative  -Patient with fever overnight, Tmax 101.2. Complaining of abdominal pain. Is tender in RUQ.  - Bilirubin remains elevated. Concerning for cholangitis. Antibiotics broadened this AM to Zosyn, Vanc. ID re consulted. Will also consult PBS for possible repeat ERCP. Cont to monitor closely.

## 2018-02-17 NOTE — SUBJECTIVE & OBJECTIVE
Interval History: ID was called back to see the patient for recurrent fevers on oral antibiotics. He is now POD 10 following most recent ex-lap on 2/7 where a walled off inferior perihepatic turbid/bilious collection was debrided in the upper RLQ. He notes slow decline since his surgery with similar symptoms as before with fatigue, nausea, and abdominal pain that is diffuse but worst centrally. His drains have been removed from his surgery. He otherwise denies HA, sinus congestion, oral ulcers, cough, SOB, diarrhea, dysuria (no UOP on iHD), rash, or line tenderness. ID workup to date for most recent fever includes repeat CT that showed a pleural effusion (now drained, bland fluid), minimal post-op fluid as expected, and no other acute findings to explain his fevers; negative blood and pleural fluid cx to date; and negative CMV quants x2 since prior minimal reactivation. He is afebrile so far today on IV antibiotics. Of note, his bilirubin has still failed to normalize despite prior interventions. Last ERCP on 1/30/2018 was reviewed which choledocholithiasis and stone was removed by balloon extraction (no stricture was noted but 2 stents were placed in the CBD given quick repeat occurence of stone/sludge since the prior ERCP).      Review of Systems   Constitutional: Positive for activity change, appetite change, fatigue and fever. Negative for chills.   HENT: Negative.  Negative for congestion and facial swelling.    Eyes: Negative for pain, discharge and visual disturbance.   Respiratory: Negative for cough, chest tightness, shortness of breath and wheezing.    Cardiovascular: Negative for chest pain, palpitations and leg swelling.   Gastrointestinal: Positive for abdominal distention, abdominal pain and nausea. Negative for constipation and vomiting.   Endocrine: Negative.    Genitourinary: Positive for decreased urine volume. Negative for difficulty urinating.        Anuric   Musculoskeletal: Negative for  arthralgias, back pain and myalgias.   Skin: Positive for color change and wound. Negative for rash.   Allergic/Immunologic: Positive for immunocompromised state.   Neurological: Negative for dizziness, seizures, weakness and headaches.   Psychiatric/Behavioral: Positive for decreased concentration and dysphoric mood. Negative for confusion. The patient is not nervous/anxious.    All other systems reviewed and are negative.      Objective:     Vital Signs (Most Recent):  Temp: 99.2 °F (37.3 °C) (02/17/18 1542)  Pulse: 90 (02/17/18 1542)  Resp: 16 (02/17/18 1542)  BP: 135/85 (02/17/18 1542)  SpO2: 95 % (02/17/18 1542) Vital Signs (24h Range):  Temp:  [98.6 °F (37 °C)-101.2 °F (38.4 °C)] 99.2 °F (37.3 °C)  Pulse:  [84-92] 90  Resp:  [16-20] 16  SpO2:  [92 %-96 %] 95 %  BP: (128-137)/(75-85) 135/85     Weight: 60.2 kg (132 lb 11.5 oz)  Body mass index is 21.42 kg/m².    Estimated Creatinine Clearance: 26.8 mL/min (A) (based on SCr of 3.5 mg/dL (H)).    Physical Exam   Constitutional: He is oriented to person, place, and time. He appears well-developed. No distress.   Temporal and distal extremity muscle wasting   HENT:   Head: Normocephalic and atraumatic.   Mouth/Throat: No oropharyngeal exudate.   dylan tube   Eyes: EOM are normal. Pupils are equal, round, and reactive to light. No scleral icterus.   Neck: Normal range of motion. Neck supple. No JVD present. No thyromegaly present.   Cardiovascular: Normal rate, regular rhythm, normal heart sounds and intact distal pulses.    No murmur heard.  Pulmonary/Chest: Effort normal. No respiratory distress. He has no wheezes. He exhibits no tenderness.   Diminished to RLL   Abdominal: Soft. Bowel sounds are normal. He exhibits ascites. He exhibits no distension. There is tenderness. There is no rebound and no guarding.   Dressing to chevron.  Wd vac removed 1/12/18  Dependent edema present R flank; surrounding erythema   Musculoskeletal: Normal range of motion. He exhibits no  tenderness. Edema: 2+ LE edema.   Neurological: He is alert and oriented to person, place, and time. He has normal reflexes.   Skin: Skin is warm and dry. He is not diaphoretic. No erythema.   Psychiatric: His mood appears not anxious.   Nursing note and vitals reviewed.      Significant Labs:   CBC:     Recent Labs  Lab 02/16/18  0436 02/17/18  0343   WBC 2.85* 2.23*   HGB 8.8* 7.8*   HCT 26.8* 23.9*    134*     CMP:     Recent Labs  Lab 02/16/18  0436 02/17/18  0343   * 137   K 4.0 3.8    105   CO2 23 23   GLU 84 81   BUN 23* 13   CREATININE 5.1* 3.5*   CALCIUM 9.5 8.8   PROT 5.6* 5.1*   ALBUMIN 2.0* 1.8*   BILITOT 2.2* 2.1*   ALKPHOS 125 111   AST 25 18   ALT <5* <5*   ANIONGAP 11 9   EGFRNONAA 14.2* 22.4*       Significant Imaging: I have reviewed all pertinent imaging results/findings within the past 24 hours.     CT CAP:   Post surgical changes prior hepatic transplant.  Percutaneous surgical drain with notable decrease in size of fluid collection in lower abdomen.  Small amount of fluid tracking elsewhere within the abdomen and pelvis.  Large volume right pleural fluid, increased in size since prior exam, with associated right lower lobe atelectasis.  Diffuse body wall edema.  Small pericardial effusion.    Microbiology:  1/11 blood cx: negative  1/11 pleural fluid cx: negative  1/11 ascites cx: negative  1/17 blood cx: negative  1/18 blood cx: negative  1/19 pleural fluid cx: NGTD  1/19 ascites cx: NGTD  2/1 RVP negative  2/7 OR cx: NGTD  2/11 blood cx: negative  2/15 blood cx: negative  2/16 pleural fluid cx: negative

## 2018-02-17 NOTE — ASSESSMENT & PLAN NOTE
- tbili with increase prompting ERCP 1/30 with stones and sludge, stents placed.  - T bili remains elevated. Along with ongoing fevers, abdominal pain, will consult PBS for ERCP as with possible cholangitis.

## 2018-02-17 NOTE — PROGRESS NOTES
[facility]  Liver Transplant  Progress Note    Patient Name: Jhonny Diana  MRN: [id number]  Admission Date: 2018  Hospital Length of Stay: 37 days  Code Status: Full Code  Primary Care Provider: Primary Doctor No  Post-Operative Day: 121    ORGAN:   LIVER  Disease Etiology: Acute Alcoholic Hepatitis  Donor Type:    - Brain Death  CDC High Risk:   No  Donor CMV Status:   Donor CMV Status: Positive  Donor HBcAB:   Negative  Donor HCV Status:   Negative  Whole or Partial: Whole Liver  Biliary Anastomosis: End to End  Arterial Anatomy: Standard  Subjective:     History of Present Illness:  Jhonny Diana is a 26 y/o male with past medical history of alcoholic cirrhosis.  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 Found to have peritonitis in November (WBC 5000, 75% PNM) neg for bile leak. He was treated initially with vanc/cefepime. Repeat cell counts  with some improvement (WBC 1400, 45% PNM). He has undergone multiple paracenteses as well as abscess drainage of perihepatic fluid collections and treated with antimicrobial therapy but no positive cultures. Of note, biliary stricture also identified and ERCP performed on 2017 with sphincterotomy and biliary stent placed. Liver tests still have not normalized despite intervention, bilirubin and AP remain elevated. Other pertinent PMH current wound vac in place 2/2 wound infection, malnutrition requiring TPN for short course and ongoing hypoalbuminemia, and seizure activity while on prograf and has since been switched to cyclosporine without reoccurrence.  He presented to the ER for fever, abdominal pain, and N/V. He reports fever (103) for 1  day prior. Overnight, he developed N/V, reports small amount of green emesis with new left sided pain. He also endorses worsening SOB with exertion. He was scheduled as an outpatient for follow up paracentesis and IR drainage of fluid collection. CXR in ER shows large pleural effusion with subsegmental atelectasis. Infectious work up initiated in ER. His ANC is 900. Broad spectrum antibiotics initiated in ED. He denies chest pain, palpitations, diarrhea, constipation, or back pain. Denies any sick contacts.    Hospital Course:  Thoracentesis (1.2L off), Paracentesis (1.6L off), and IR drainage of fluid collection 1/11, all fluids negative for infection. ID consulted. Broad spectrum antibiotics d/c'd 1/15. Chronically malnourished with poor PO intake, prealbumin 7. Shane tube placed 1/15 for tube feedings.     ERCP 1/17 with sludge and a biliary stone which was removed and stent exchanged.  Remained with n/v; therefore, TF remained on hold and TPN continued.   Pt with fever s/p ERCP on 1/17 which continued until 1/19.  Vanc/cefepime restarted.  Blood cx 1/17 and 1/18 NGTD.  ID reconsulted.  Fungal markers sent.  CT C/A/P obtained.  With large R pleural effusion and ascites- both drained 1/19 and negative for infx per cell count. Pt afebrile 1/20. Liver biopsy 1/23 - without rejection.  EGD 1/25 - unremarkable for source of GI symptoms.  Resume diet along with other GI recommendations. SHANE tube placed for tube feeds.       Interval History: Patient with fever overnight, Tmax 101.2. Patient not feeling well this AM. Complaining of abdominal pain. Is tender in RUQ. Blood cx from 2/15 with NGTD. CMV PCR 2/15 negative. Thora yesterday, cell count neg for infection, cx pending. CT A/P 2/15 overall unremarkable. Bilirubin remains elevated. Concerning for cholangitis. Antibiotics broadened this AM to Zosyn, Vanc. ID re consulted. Will also consult PBS for possible repeat ERCP. Cont to monitor closely.     Scheduled Meds:    atovaquone  1,500 mg Oral Daily    cycloSPORINE  175 mg Oral BID    docusate sodium  100 mg Oral BID    epoetin maurisio (PROCRIT) injection  10,000 Units Intravenous Every Mon, Wed, Fri    heparin (porcine)  5,000 Units Subcutaneous Q8H    custom IVPB builder   Intravenous Q24H    levetiracetam oral soln  500 mg Oral BID    levothyroxine  75 mcg Oral Before breakfast    metoclopramide HCl  5 mg Oral QID (AC & HS)    omeprazole  40 mg Oral QAM    piperacillin-tazobactam (ZOSYN) IVPB  4.5 g Intravenous Q12H    polyethylene glycol  17 g Oral BID    psyllium husk (aspartame)  3.4 g Oral BID    ursodiol  300 mg Oral TID    valganciclovir 50 mg/ml  200 mg Oral Every Mon, Wed, Fri    vancomycin (VANCOCIN) IVPB  1,000 mg Intravenous Once     Continuous Infusions:  PRN Meds:sodium chloride, sodium chloride 0.9%, acetaminophen, albuterol-ipratropium 2.5mg-0.5mg/3mL, bisacodyl, heparin (porcine), omnipaque, ondansetron, ondansetron, oxyCODONE, prochlorperazine, simethicone, sodium chloride 0.9%, sodium chloride 0.9%    Review of Systems   Constitutional: Positive for activity change, appetite change, fatigue and fever. Negative for chills.   HENT: Negative.  Negative for congestion and facial swelling.    Eyes: Negative for pain, discharge and visual disturbance.   Respiratory: Negative for cough, chest tightness, shortness of breath and wheezing.    Cardiovascular: Negative for chest pain, palpitations and leg swelling.   Gastrointestinal: Positive for abdominal distention, abdominal pain and nausea. Negative for constipation and vomiting.   Endocrine: Negative.    Genitourinary: Positive for decreased urine volume. Negative for difficulty urinating.        Anuric   Musculoskeletal: Negative for arthralgias, back pain and myalgias.   Skin: Positive for color change and wound. Negative for rash.   Allergic/Immunologic: Positive for immunocompromised state.   Neurological: Negative for dizziness, seizures, weakness  and headaches.   Psychiatric/Behavioral: Positive for decreased concentration and dysphoric mood. Negative for confusion. The patient is not nervous/anxious.    All other systems reviewed and are negative.    Objective:     Vital Signs (Most Recent):  Temp: 99.4 °F (37.4 °C) (02/17/18 0721)  Pulse: 87 (02/17/18 0721)  Resp: 20 (02/17/18 0510)  BP: 128/79 (02/17/18 0721)  SpO2: (!) 94 % (02/17/18 0721) Vital Signs (24h Range):  Temp:  [98.4 °F (36.9 °C)-101.2 °F (38.4 °C)] 99.4 °F (37.4 °C)  Pulse:  [84-92] 87  Resp:  [14-20] 20  SpO2:  [94 %-96 %] 94 %  BP: (125-135)/(75-81) 128/79     Weight: 60.2 kg (132 lb 11.5 oz)  Body mass index is 21.42 kg/m².    Intake/Output - Last 3 Shifts       02/15 0700 - 02/16 0659 02/16 0700 - 02/17 0659 02/17 0700 - 02/18 0659    P.O. 180 240     Other  600     NG/GT       Total Intake(mL/kg) 180 (2.7) 840 (14)     Urine (mL/kg/hr) 0 (0) 0 (0)     Emesis/NG output  0 (0)     Drains       Other  3100 (2.1)     Stool 0 (0) 0 (0)     Blood  0 (0)     Total Output 0 3100      Net +180 -2260             Urine Occurrence 0 x 0 x     Stool Occurrence 0 x 0 x     Emesis Occurrence  0 x           Physical Exam   Constitutional: He is oriented to person, place, and time. He appears well-developed. No distress.   Temporal and distal extremity muscle wasting   HENT:   Head: Normocephalic and atraumatic.   Mouth/Throat: No oropharyngeal exudate.   dylan tube   Eyes: EOM are normal. Pupils are equal, round, and reactive to light. Scleral icterus is present.   Neck: Normal range of motion. Neck supple. No JVD present. No thyromegaly present.   Cardiovascular: Normal rate, regular rhythm, normal heart sounds and intact distal pulses.    No murmur heard.  Pulmonary/Chest: Effort normal. No respiratory distress. He has decreased breath sounds in the right middle field, the right lower field and the left lower field. He has no wheezes. He exhibits no tenderness.   Diminished to RLL   Abdominal: Soft.  Bowel sounds are normal. He exhibits ascites. He exhibits no distension. There is tenderness. There is no rebound and no guarding.   Dressing to chevron.  Wd vac removed 1/12/18  Dependent edema present R flank; surrounding erythema   Musculoskeletal: Normal range of motion. He exhibits edema (2+ LE edema). He exhibits no tenderness.   Neurological: He is alert and oriented to person, place, and time. He has normal reflexes.   Skin: Skin is warm and dry. Capillary refill takes 2 to 3 seconds. He is not diaphoretic. No erythema.   jaundice   Psychiatric: He has a normal mood and affect. His behavior is normal. Judgment and thought content normal. His mood appears not anxious.   Depressed mood   Nursing note and vitals reviewed.      Laboratory:  Immunosuppressants         Stop Route Frequency     cycloSPORINE (NEORAL) 100 mg/mL microemulsion solution 175 mg      -- Oral 2 times daily        CBC:     Recent Labs  Lab 02/17/18  0343   WBC 2.23*   RBC 2.50*   HGB 7.8*   HCT 23.9*   *   MCV 96   MCH 31.2*   MCHC 32.6     CMP:     Recent Labs  Lab 02/17/18  0343   GLU 81   CALCIUM 8.8   ALBUMIN 1.8*   PROT 5.1*      K 3.8   CO2 23      BUN 13   CREATININE 3.5*   ALKPHOS 111   ALT <5*   AST 18   BILITOT 2.1*     Coagulation:     Recent Labs  Lab 02/16/18  0436   INR 1.0     Labs within the past 24 hours have been reviewed.    Diagnostic Results:  None    Assessment/Plan:     Other cytomegaloviral diseases    - detected at 370 on CMV PCR 2/1.  - case discussed with ID and will hold off on treatment at this time given low WBC.   - undetected CMV PCR 2/8.  - continue Valcyte 200 mg every Mon, Wed, Fri.  -CMV PCR 2/15 pending        Fever    - Started on vanc/cefepime for ERCP. Now transitioned to vanc/zosyn.  - Blood cultures sent, prelim NGTD. Repeat blood cultures and chest xray today 2/10.  - thoracentesis 2/1 - fluid negative for infection.  - atelectasis noted on ct scan - start breathing tx & CPT.  -  now cmv positive - started treatment 2/5/18.  - taken to OR 2/7 for exp lap - fluid collections drained, cultures pending.  - Patient with low grade fever 2/15, 100.3.  -CT A/P 2/15 with no evidence of IA fluid infection. Did show R pleural effusion s/p thoracentesis, cell count negative for infection, cx pending.  -Repeat blood cx 2/15 prelim NGTD, CMV PCR 2/15 negative  -Patient with fever overnight, Tmax 101.2. Complaining of abdominal pain. Is tender in RUQ.  - Bilirubin remains elevated. Concerning for cholangitis. Antibiotics broadened this AM to Zosyn, Vanc. ID re consulted. Will also consult PBS for possible repeat ERCP. Cont to monitor closely.           Constipation    - Enema ordered 1/20 and 1/21 with BM.  - Encourage ambulation.  - decrease narcotics.  - d/c metamucil and miralax bid as makes patient nauseated.  - increase reglan qid.  - small bowel follow through with normal findings.   - patient reports bowel movement 2/13        Other ascites    - Paracentesis performed 1/11/18 with 1600 ml removed.    - fluid negative for infection.   - Repeat para 1/19 negative for infection per cell count.  - no fluid seen for paracentesis on 1/23.        Severe protein-calorie malnutrition    - Poor PO intake since transplant requiring short course of TPN during previous hospital stay.   - albumin remains decreased but appetite slowly improving per pt.   - Dietary consulted. Will need to closely monitor PO intake.   - supplements also ordered.   - SHANE with tube feeding start 1/16. Stopped 1/17/18, was not tolerating. Shane removed 1/20.  - prealbumin 7 on 1/15.  - TPN started 1/16/18.   - Pt able to eat minimally 1/21 and 1/22 which is an improvement.  - gi consulted - EGD reviewed, increased ppi bid, miralax bid, and metamucil bid, decreased narcotics.  - continue reglan, placed SHANE tube with tube feeds - transitioned to nightly feeds. Not tolerating well due to nausea.   - small bowel follow through with normal  findings.   - consulted gi for possible gj tube placement  - Marinol started with some improvement in appetite seen.   -Will monitor through the weekend with possible plan for gj tube next week.           Nausea and vomiting    - h/o constipation, reports having regular BM's with bowel regimen.  - vomiting episode x 1 at home. Anti-emetics ordered PRN.  - KUB repeated 1/14/18 given increased abdominal pain- mild, generalized bowel distention suggesting ileus.  Diet changed to clears as tolerated for bowel rest .  - SHANE tube placed 1/16 for tube feeds. Not tolerating tube feeds, stopped 1/17/18.  - D/C shane 1/19, now in place as of 1/25.  - chronic constipation, Cont bowel regimen.   - GI consulted. See protein calorie malnutrition.  - not tolerating TFs due to nausea. Holding periodically overnight.         Recurrent pleural effusion on right    - CXR in ER with large right pleural effusion with subjective c/o worsening SOB  - thoracentesis completed- 1200 ml removed.  SOB with significant improvement.   - Cell count reviewed and negative for infection.   - resp even and non labored.  O2 sat 94-98%.  - Repeat thora 1/19 negative for infection per cell count.  - Reaccumulating fluid on xray 1/21.  - thoracentesis 1/31 - 1.4L removed, negative for infection  - Reaccumulating fluid again on xray 2/10.   -Thoracentesis performed  2/16 with 1500 cc off, cell count neg for infection, cx pending.          Biliary stricture of transplanted liver    - tbili with increase prompting ERCP 1/30 with stones and sludge, stents placed.  - T bili remains elevated. Along with ongoing fevers, abdominal pain, will consult PBS for ERCP as with possible cholangitis.         Anemia of chronic disease    - H/H stable. Cont to monitor with daily cbc.         Delayed surgical wound healing    - wd vac removed 1/12/18.  Wound healing well. Aquacel AG and Mepilex border dressing applied via wound care recs.           At risk for opportunistic  infections    - CMV detected 2/1 now on tx dose valcyte. CMV PCR 2/15 not detected.   - bactrim held.            Long-term use of immunosuppressant medication    - Maintenance IS with cyclosporine. MMF on hold for infections and neutropenia. Continue to check cyclosporine level daily. Assess for toxicity and adjust level as needed.        Prophylactic immunotherapy    - See long term use of immunosuppression.         Liver transplanted    - Post op course complicated by fevers and hyperbilirubinemia.  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  - AST/ALT normal. Tbili/alkphos remain elevated.  - Liver US 1/8 showed 3.9 cm complex fluid collection anterior to right lobe and moderate nonspecific complex ascites inferior to transplant.   - IR placed drain 1/11/18, cell count negative for infection.  - PBS consulted. PBS not comfortable proceeding with EUS with liver biopsy given fever and possible infectious process going on. ERCP 1/17 with stone and sludge. Pt placed on Actigall - treated with 10 day course of abx.  ID now signed off.  - Bili elevated - liver u/s 1/28  - Liver biopsy 1/23 without rejection.  - consulted PBS for ERCP - Choledocholithiasis was found along with sludge, stents placed 1/30.  - T bili remains elevated. Will consult PBS for possible ERCP in light of fevers, elevated bili, and abdominal pain.              Acute renal failure with tubular necrosis    - HD resumed on previous admission. Now anuric and dialyzes M-W-F.  - Nephrology following.    - ktm consulted for possible kidney transplant.            VTE Risk Mitigation         Ordered     heparin (porcine) injection 1,000 Units  As needed (PRN)     Route:  Intra-Catheter        02/05/18 0945     heparin (porcine) injection 5,000 Units  Every 8 hours     Route:  Subcutaneous        01/31/18 0957     Medium Risk of VTE  Once      01/11/18 0351     Place sequential compression device  Until discontinued      01/11/18 0351           The patients clinical status was discussed at multidisplinary rounds, involving transplant surgery, transplant medicine, pharmacy, nursing, nutrition, and social work    Discharge Planning: Not a candidate for discharge at this time.     ANA CRISTINA BusbyC  Liver Transplant  [facility]

## 2018-02-17 NOTE — PLAN OF CARE
AAO x 4, tmax was 100.1- prn tylenol given for fever, SpO2>95% on room air.  Increased tube feedings to 30ml/hr, plan to increase Q4 hours to goal of 50ml/hr tolerating well. Pain well controlled with oral pain medicine-oxycodone. Pt free from falls and injury throughout shift.  See flowsheet for assessment findings.  Will continue to monitor.

## 2018-02-17 NOTE — PROGRESS NOTES
[facility]  Gastroenterology  Progress Note    Patient Name: Jhonny Diana  MRN: [id number]  Admission Date: 1/11/2018  Hospital Length of Stay: 37 days  Code Status: Full Code   Attending Provider: Nathanael Medina MD  Consulting Provider: Jose Storey MD  Primary Care Physician: Primary Doctor No  Principal Problem: Fever      Subjective:     Interval History:     AES reconsulted for fevers and concern for biliary infection  Pt states had thoracentesis yesterday.  Also RLQ drain recently removed.  He has infraumbilical pain ,no RUQ pain.        Reviewed recent ERCP 1/30/18  Impression:           - One stent from the biliary tree was seen in the                         major papilla.                        - One stent was removed from the biliary tree.                        - Choledocholithiasis was found. Complete removal                         was accomplished by balloon extraction. No                         cholangiographic evidence for stricture at                         anastomosis.                        - Two plastic stents were placed into the common                         bile duct (given quick repeat occurence of                         stone/sludge since last ERCP, stents placed to                         optimize drainage should this occur again).    Review of Systems   Constitutional: Positive for chills, fatigue and fever.   Respiratory: Negative for chest tightness and shortness of breath.    Gastrointestinal: Negative for abdominal distention and abdominal pain.   Skin: Positive for color change and pallor.   Neurological: Positive for weakness.     Objective:     Vital Signs (Most Recent):  Temp: 99.4 °F (37.4 °C) (02/17/18 0721)  Pulse: 87 (02/17/18 0721)  Resp: 20 (02/17/18 0510)  BP: 128/79 (02/17/18 0721)  SpO2: (!) 94 % (02/17/18 0721) Vital Signs (24h Range):  Temp:  [98.5 °F (36.9 °C)-101.2 °F (38.4 °C)] 99.4 °F (37.4 °C)  Pulse:  [84-92] 87  Resp:  [16-20] 20  SpO2:   [94 %-96 %] 94 %  BP: (125-134)/(75-81) 128/79     Weight: 60.2 kg (132 lb 11.5 oz) (02/17/18 0600)  Body mass index is 21.42 kg/m².      Intake/Output Summary (Last 24 hours) at 02/17/18 1145  Last data filed at 02/16/18 1644   Gross per 24 hour   Intake              240 ml   Output             1500 ml   Net            -1260 ml       Lines/Drains/Airways     Central Venous Catheter Line                 Hemodialysis Catheter right internal jugular -- days         Trialysis (Dialysis) Catheter right internal jugular -- days          Drain                 Trans Pyloric Feeding Tube 01/26/18 nasogastric 22 days          Peripheral Intravenous Line                 Midline Catheter Insertion/Assessment  - Single Lumen 02/06/18 0917 Left brachial vein 18g x 8cm 11 days                Physical Exam   Constitutional: He is oriented to person, place, and time. He appears well-developed. No distress.   HENT:   Head: Normocephalic and atraumatic.   Eyes: Conjunctivae are normal.   Neck: Trachea normal.   Cardiovascular: Normal rate, regular rhythm, S1 normal, S2 normal and normal pulses.    Pulmonary/Chest: Effort normal and breath sounds normal.   Abdominal: Soft. Bowel sounds are normal. He exhibits distension.   Well-healed transverse abdominal scar ;interval removal of drain in RLQ ; TTP in the LLQ and infraumbilical are; no RUQ tenderness.   Musculoskeletal: Normal range of motion. He exhibits edema (1+ pitting edema).   Neurological: He is alert and oriented to person, place, and time.   Skin: Skin is warm and dry.   Vitals reviewed.      Significant Labs:  Blood Culture:   Recent Labs  Lab 02/15/18  1729   LABBLOO No Growth to date  No Growth to date  No Growth to date  No Growth to date     CBC:   Recent Labs  Lab 02/16/18  0436 02/17/18  0343   WBC 2.85* 2.23*   HGB 8.8* 7.8*   HCT 26.8* 23.9*    134*     CMP:   Recent Labs  Lab 02/17/18  0343   GLU 81   CALCIUM 8.8   ALBUMIN 1.8*   PROT 5.1*      K  3.8   CO2 23      BUN 13   CREATININE 3.5*   ALKPHOS 111   ALT <5*   AST 18   BILITOT 2.1*     Coagulation:   Recent Labs  Lab 02/16/18  0436   INR 1.0     CRP: No results for input(s): CRP in the last 48 hours.  ESR: No results for input(s): SEDRATE in the last 48 hours.  H.Pylori Ab IgG: No results for input(s): HPYLORIIGG in the last 48 hours.  Lipase: No results for input(s): LIPASE in the last 48 hours.  Stool C. diff: No results for input(s): CDIFFICILEAN, CDIFFTOX in the last 48 hours.  Stool Culture: No results for input(s): STOOLCULTURE in the last 48 hours.  Stool Ova/Cysts/Parasites: No results for input(s): STLEXAMOCP in the last 48 hours.  Stool Giardia/Crypto: No results for input(s): GIARDIAANTIG, CRSPAG in the last 48 hours.  Stool WBCs: No results for input(s): STOOLWBC in the last 48 hours.  Stool Lactoferrin: No results for input(s): LACTOFERRINS in the last 48 hours.      Significant Imaging:  Imaging results within the past 24 hours have been reviewed.    Assessment/Plan:     Fever    Febrile episode overnight with Tmax 101   Recent ERCP as above. Last ERCP performed when Tbili was > 5.   Tbili has remained stable at 2 over past week. LFTs normal otherwise.    Plan:  Broad infectious workup per primary team including CXR, blood cultures, paracentesis for cell counts, Cdiff.  If continues to have febrile episodes , we can consider repeating ERCP on Monday  Continue Abx, appreciate ID assistance.            Thank you for your consult. I will follow-up with patient. Please contact us if you have any additional questions.    Jose Storey MD  Gastroenterology  [facility]

## 2018-02-17 NOTE — PLAN OF CARE
Problem: Patient Care Overview  Goal: Plan of Care Review  Outcome: Ongoing (interventions implemented as appropriate)  Pt drowsy throughout day, but oriented and arouses appropriately to voice. Follows all commands. Ambulated to restroom using walker. POC continued with patient. Vital signs stable throughout shift. Complained of abdominal pain, though pain was appropriately controlled with PRN pain medication. POC reviewed w/ pt and pt's mother with both verbalizing understanding of teaching. Call light within reach. Will continue to monitor.

## 2018-02-17 NOTE — ASSESSMENT & PLAN NOTE
Febrile episode overnight with Tmax 101   Recent ERCP as above. Last ERCP performed when Tbili was > 5.   Tbili has remained stable at 2 over past week. LFTs normal otherwise.    Plan:  Broad infectious workup per primary team including CXR, blood cultures, paracentesis for cell counts, Cdiff.  If continues to have febrile episodes , we can consider repeating ERCP on Monday  Continue Abx, appreciate ID assistance.

## 2018-02-18 LAB
ALBUMIN SERPL BCP-MCNC: 1.8 G/DL
ALP SERPL-CCNC: 104 U/L
ALT SERPL W/O P-5'-P-CCNC: <5 U/L
ANION GAP SERPL CALC-SCNC: 10 MMOL/L
ANISOCYTOSIS BLD QL SMEAR: ABNORMAL
AST SERPL-CCNC: 21 U/L
BASOPHILS # BLD AUTO: ABNORMAL K/UL
BASOPHILS NFR BLD: 1 %
BILIRUB SERPL-MCNC: 2.3 MG/DL
BUN SERPL-MCNC: 20 MG/DL
CALCIUM SERPL-MCNC: 9.2 MG/DL
CHLORIDE SERPL-SCNC: 106 MMOL/L
CO2 SERPL-SCNC: 21 MMOL/L
CREAT SERPL-MCNC: 4.9 MG/DL
CYCLOSPORINE BLD LC/MS/MS-MCNC: 188 NG/ML
DIFFERENTIAL METHOD: ABNORMAL
EOSINOPHIL # BLD AUTO: ABNORMAL K/UL
EOSINOPHIL NFR BLD: 2 %
ERYTHROCYTE [DISTWIDTH] IN BLOOD BY AUTOMATED COUNT: 15.2 %
EST. GFR  (AFRICAN AMERICAN): 17.3 ML/MIN/1.73 M^2
EST. GFR  (NON AFRICAN AMERICAN): 14.9 ML/MIN/1.73 M^2
GLUCOSE SERPL-MCNC: 76 MG/DL
HCT VFR BLD AUTO: 24.3 %
HGB BLD-MCNC: 7.9 G/DL
IMM GRANULOCYTES # BLD AUTO: ABNORMAL K/UL
IMM GRANULOCYTES NFR BLD AUTO: ABNORMAL %
LYMPHOCYTES # BLD AUTO: ABNORMAL K/UL
LYMPHOCYTES NFR BLD: 32 %
MAGNESIUM SERPL-MCNC: 1.6 MG/DL
MCH RBC QN AUTO: 30.5 PG
MCHC RBC AUTO-ENTMCNC: 32.5 G/DL
MCV RBC AUTO: 94 FL
METAMYELOCYTES NFR BLD MANUAL: 2 %
MONOCYTES # BLD AUTO: ABNORMAL K/UL
MONOCYTES NFR BLD: 13 %
NEUTROPHILS NFR BLD: 46 %
NEUTS BAND NFR BLD MANUAL: 4 %
NRBC BLD-RTO: 0 /100 WBC
OVALOCYTES BLD QL SMEAR: ABNORMAL
PHOSPHATE SERPL-MCNC: 3.1 MG/DL
PLATELET # BLD AUTO: 132 K/UL
PMV BLD AUTO: 11 FL
POIKILOCYTOSIS BLD QL SMEAR: SLIGHT
POLYCHROMASIA BLD QL SMEAR: ABNORMAL
POTASSIUM SERPL-SCNC: 4.1 MMOL/L
PROT SERPL-MCNC: 4.9 G/DL
RBC # BLD AUTO: 2.59 M/UL
SODIUM SERPL-SCNC: 137 MMOL/L
VANCOMYCIN SERPL-MCNC: 25 UG/ML
WBC # BLD AUTO: 4.26 K/UL

## 2018-02-18 PROCEDURE — 85007 BL SMEAR W/DIFF WBC COUNT: CPT

## 2018-02-18 PROCEDURE — 63600175 PHARM REV CODE 636 W HCPCS: Performed by: PHYSICIAN ASSISTANT

## 2018-02-18 PROCEDURE — 36415 COLL VENOUS BLD VENIPUNCTURE: CPT

## 2018-02-18 PROCEDURE — 84100 ASSAY OF PHOSPHORUS: CPT

## 2018-02-18 PROCEDURE — 94761 N-INVAS EAR/PLS OXIMETRY MLT: CPT

## 2018-02-18 PROCEDURE — 63600175 PHARM REV CODE 636 W HCPCS: Mod: JG | Performed by: SURGERY

## 2018-02-18 PROCEDURE — 25000003 PHARM REV CODE 250: Performed by: SURGERY

## 2018-02-18 PROCEDURE — 99233 SBSQ HOSP IP/OBS HIGH 50: CPT | Mod: ,,, | Performed by: INTERNAL MEDICINE

## 2018-02-18 PROCEDURE — 80202 ASSAY OF VANCOMYCIN: CPT

## 2018-02-18 PROCEDURE — 99233 SBSQ HOSP IP/OBS HIGH 50: CPT | Mod: 24,,, | Performed by: PHYSICIAN ASSISTANT

## 2018-02-18 PROCEDURE — 94664 DEMO&/EVAL PT USE INHALER: CPT

## 2018-02-18 PROCEDURE — 63600175 PHARM REV CODE 636 W HCPCS: Performed by: NURSE PRACTITIONER

## 2018-02-18 PROCEDURE — 85027 COMPLETE CBC AUTOMATED: CPT

## 2018-02-18 PROCEDURE — 80158 DRUG ASSAY CYCLOSPORINE: CPT

## 2018-02-18 PROCEDURE — 20600001 HC STEP DOWN PRIVATE ROOM

## 2018-02-18 PROCEDURE — 25000003 PHARM REV CODE 250: Performed by: PHYSICIAN ASSISTANT

## 2018-02-18 PROCEDURE — 25000003 PHARM REV CODE 250: Performed by: NURSE PRACTITIONER

## 2018-02-18 PROCEDURE — 80053 COMPREHEN METABOLIC PANEL: CPT

## 2018-02-18 PROCEDURE — 83735 ASSAY OF MAGNESIUM: CPT

## 2018-02-18 PROCEDURE — 99900035 HC TECH TIME PER 15 MIN (STAT)

## 2018-02-18 RX ORDER — SODIUM CHLORIDE 9 MG/ML
INJECTION, SOLUTION INTRAVENOUS ONCE
Status: COMPLETED | OUTPATIENT
Start: 2018-02-18 | End: 2018-02-19

## 2018-02-18 RX ORDER — VALGANCICLOVIR HYDROCHLORIDE 50 MG/ML
100 POWDER, FOR SOLUTION ORAL
Status: DISCONTINUED | OUTPATIENT
Start: 2018-02-19 | End: 2018-02-25

## 2018-02-18 RX ORDER — SODIUM CHLORIDE 9 MG/ML
INJECTION, SOLUTION INTRAVENOUS
Status: DISCONTINUED | OUTPATIENT
Start: 2018-02-18 | End: 2018-02-20

## 2018-02-18 RX ORDER — OXYCODONE HYDROCHLORIDE 5 MG/1
10 TABLET ORAL EVERY 6 HOURS PRN
Status: DISCONTINUED | OUTPATIENT
Start: 2018-02-18 | End: 2018-03-08

## 2018-02-18 RX ORDER — CYCLOSPORINE 100 MG/ML
200 SOLUTION ORAL 2 TIMES DAILY
Status: DISCONTINUED | OUTPATIENT
Start: 2018-02-18 | End: 2018-02-19

## 2018-02-18 RX ADMIN — URSODIOL 300 MG: 300 CAPSULE ORAL at 01:02

## 2018-02-18 RX ADMIN — METOCLOPRAMIDE HYDROCHLORIDE 5 MG: 5 SOLUTION ORAL at 09:02

## 2018-02-18 RX ADMIN — ONDANSETRON HYDROCHLORIDE 4 MG: 2 INJECTION, SOLUTION INTRAMUSCULAR; INTRAVENOUS at 01:02

## 2018-02-18 RX ADMIN — PIPERACILLIN AND TAZOBACTAM 4.5 G: 4; .5 INJECTION, POWDER, LYOPHILIZED, FOR SOLUTION INTRAVENOUS; PARENTERAL at 06:02

## 2018-02-18 RX ADMIN — ONDANSETRON HYDROCHLORIDE 4 MG: 2 INJECTION, SOLUTION INTRAMUSCULAR; INTRAVENOUS at 06:02

## 2018-02-18 RX ADMIN — SODIUM CHLORIDE: 9 INJECTION, SOLUTION INTRAVENOUS at 12:02

## 2018-02-18 RX ADMIN — OMEPRAZOLE 40 MG: 40 CAPSULE, DELAYED RELEASE ORAL at 06:02

## 2018-02-18 RX ADMIN — OXYCODONE HYDROCHLORIDE 5 MG: 5 TABLET ORAL at 01:02

## 2018-02-18 RX ADMIN — URSODIOL 300 MG: 300 CAPSULE ORAL at 09:02

## 2018-02-18 RX ADMIN — METOCLOPRAMIDE HYDROCHLORIDE 5 MG: 5 SOLUTION ORAL at 01:02

## 2018-02-18 RX ADMIN — LEVETIRACETAM 500 MG: 100 SOLUTION ORAL at 08:02

## 2018-02-18 RX ADMIN — URSODIOL 300 MG: 300 CAPSULE ORAL at 06:02

## 2018-02-18 RX ADMIN — HEPARIN SODIUM 5000 UNITS: 5000 INJECTION, SOLUTION INTRAVENOUS; SUBCUTANEOUS at 09:02

## 2018-02-18 RX ADMIN — THERA TABS 1 TABLET: TAB at 11:02

## 2018-02-18 RX ADMIN — METOCLOPRAMIDE HYDROCHLORIDE 5 MG: 5 SOLUTION ORAL at 05:02

## 2018-02-18 RX ADMIN — PIPERACILLIN AND TAZOBACTAM 4.5 G: 4; .5 INJECTION, POWDER, LYOPHILIZED, FOR SOLUTION INTRAVENOUS; PARENTERAL at 08:02

## 2018-02-18 RX ADMIN — CYCLOSPORINE 200 MG: 100 SOLUTION ORAL at 06:02

## 2018-02-18 RX ADMIN — LEVOTHYROXINE SODIUM 75 MCG: 75 TABLET ORAL at 06:02

## 2018-02-18 RX ADMIN — METOCLOPRAMIDE HYDROCHLORIDE 5 MG: 5 SOLUTION ORAL at 06:02

## 2018-02-18 RX ADMIN — OXYCODONE HYDROCHLORIDE 10 MG: 5 TABLET ORAL at 10:02

## 2018-02-18 RX ADMIN — HEPARIN SODIUM 5000 UNITS: 5000 INJECTION, SOLUTION INTRAVENOUS; SUBCUTANEOUS at 01:02

## 2018-02-18 RX ADMIN — ATOVAQUONE 1500 MG: 750 SUSPENSION ORAL at 08:02

## 2018-02-18 RX ADMIN — OXYCODONE HYDROCHLORIDE 5 MG: 5 TABLET ORAL at 08:02

## 2018-02-18 RX ADMIN — LEVETIRACETAM 500 MG: 100 SOLUTION ORAL at 09:02

## 2018-02-18 RX ADMIN — OXYCODONE HYDROCHLORIDE 10 MG: 5 TABLET ORAL at 03:02

## 2018-02-18 RX ADMIN — CYCLOSPORINE 175 MG: 100 SOLUTION ORAL at 08:02

## 2018-02-18 NOTE — ASSESSMENT & PLAN NOTE
- Post op course complicated by fevers and hyperbilirubinemia.  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  - AST/ALT normal. Tbili/alkphos remain elevated.  - Liver US 1/8 showed 3.9 cm complex fluid collection anterior to right lobe and moderate nonspecific complex ascites inferior to transplant.   - IR placed drain 1/11/18, cell count negative for infection.  - PBS consulted. PBS not comfortable proceeding with EUS with liver biopsy given fever and possible infectious process going on. ERCP 1/17 with stone and sludge. Pt placed on Actigall - treated with 10 day course of abx.  ID now signed off.  - Bili elevated - liver u/s 1/28  - Liver biopsy 1/23 without rejection.  - consulted PBS for ERCP - Choledocholithiasis was found along with sludge, stents placed 1/30.  - T bili remains elevated with ongoing fever. NPO at midnight for possible ERCP tomorrow.

## 2018-02-18 NOTE — ASSESSMENT & PLAN NOTE
- detected at 370 on CMV PCR 2/1.  - case discussed with ID and will hold off on treatment at this time given low WBC.   - undetected CMV PCR 2/8.  - continue Valcyte 200 mg every Mon, Wed, Fri.  -CMV PCR 2/15 negative  -Per ID, valcyte dose decreased to ppx dose on 2/18.

## 2018-02-18 NOTE — SUBJECTIVE & OBJECTIVE
Scheduled Meds:   atovaquone  1,500 mg Oral Daily    cycloSPORINE  200 mg Oral BID    docusate sodium  100 mg Oral BID    epoetin maurisio (PROCRIT) injection  10,000 Units Intravenous Every Mon, Wed, Fri    heparin (porcine)  5,000 Units Subcutaneous Q8H    custom IVPB builder   Intravenous Q24H    levetiracetam oral soln  500 mg Oral BID    levothyroxine  75 mcg Oral Before breakfast    metoclopramide HCl  5 mg Oral QID (AC & HS)    multivitamin  1 tablet Oral Daily    omeprazole  40 mg Oral QAM    piperacillin-tazobactam (ZOSYN) IVPB  4.5 g Intravenous Q12H    polyethylene glycol  17 g Oral BID    psyllium husk (aspartame)  3.4 g Oral BID    ursodiol  300 mg Oral TID    [START ON 2/19/2018] valganciclovir 50 mg/ml  100 mg Oral Every Mon, Wed, Fri     Continuous Infusions:  PRN Meds:sodium chloride, sodium chloride 0.9%, acetaminophen, albuterol-ipratropium 2.5mg-0.5mg/3mL, bisacodyl, heparin (porcine), omnipaque, ondansetron, ondansetron, oxyCODONE, prochlorperazine, simethicone, sodium chloride 0.9%, sodium chloride 0.9%    Review of Systems   Constitutional: Positive for activity change, appetite change, fatigue and fever. Negative for chills.   HENT: Negative.  Negative for congestion and facial swelling.    Eyes: Negative for pain, discharge and visual disturbance.   Respiratory: Negative for cough, chest tightness, shortness of breath and wheezing.    Cardiovascular: Negative for chest pain, palpitations and leg swelling.   Gastrointestinal: Positive for abdominal distention, abdominal pain and nausea. Negative for constipation and vomiting.   Endocrine: Negative.    Genitourinary: Positive for decreased urine volume. Negative for difficulty urinating.        Anuric   Musculoskeletal: Negative for arthralgias, back pain and myalgias.   Skin: Positive for color change and wound. Negative for rash.   Allergic/Immunologic: Positive for immunocompromised state.   Neurological: Negative for dizziness,  seizures, weakness and headaches.   Psychiatric/Behavioral: Positive for decreased concentration and dysphoric mood. Negative for confusion. The patient is not nervous/anxious.    All other systems reviewed and are negative.    Objective:     Vital Signs (Most Recent):  Temp: 98.9 °F (37.2 °C) (02/18/18 1218)  Pulse: 90 (02/18/18 1218)  Resp: 17 (02/18/18 1218)  BP: 130/76 (02/18/18 1218)  SpO2: 95 % (02/18/18 1218) Vital Signs (24h Range):  Temp:  [98.8 °F (37.1 °C)-100.4 °F (38 °C)] 98.9 °F (37.2 °C)  Pulse:  [81-96] 90  Resp:  [16-20] 17  SpO2:  [95 %-97 %] 95 %  BP: (128-140)/(76-85) 130/76     Weight: 61.1 kg (134 lb 11.2 oz)  Body mass index is 21.74 kg/m².    Intake/Output - Last 3 Shifts       02/16 0700 - 02/17 0659 02/17 0700 - 02/18 0659 02/18 0700 - 02/19 0659    P.O. 240 480     Other 600      NG/GT  220 150    IV Piggyback  450     Total Intake(mL/kg) 840 (14) 1150 (18.8) 150 (2.5)    Urine (mL/kg/hr) 0 (0)      Emesis/NG output 0 (0)      Other 3100 (2.1)      Stool 0 (0)      Blood 0 (0)      Total Output 3100        Net -2260 +1150 +150           Urine Occurrence 0 x 0 x     Stool Occurrence 0 x 2 x     Emesis Occurrence 0 x            Physical Exam   Constitutional: He is oriented to person, place, and time. He appears well-developed. No distress.   Temporal and distal extremity muscle wasting   HENT:   Head: Normocephalic and atraumatic.   Mouth/Throat: No oropharyngeal exudate.   dylan tube   Eyes: EOM are normal. Pupils are equal, round, and reactive to light. Scleral icterus is present.   Neck: Normal range of motion. Neck supple. No JVD present. No thyromegaly present.   Cardiovascular: Normal rate, regular rhythm, normal heart sounds and intact distal pulses.    No murmur heard.  Pulmonary/Chest: Effort normal. No respiratory distress. He has decreased breath sounds in the right middle field, the right lower field and the left lower field. He has no wheezes. He exhibits no tenderness.    Diminished to RLL   Abdominal: Soft. Bowel sounds are normal. He exhibits ascites. He exhibits no distension. There is tenderness. There is no rebound and no guarding.   Dressing to chevron.  Wd vac removed 1/12/18  Dependent edema present R flank; surrounding erythema   Musculoskeletal: Normal range of motion. He exhibits edema (2+ LE edema). He exhibits no tenderness.   Neurological: He is alert and oriented to person, place, and time. He has normal reflexes.   Skin: Skin is warm and dry. Capillary refill takes 2 to 3 seconds. He is not diaphoretic. No erythema.   jaundice   Psychiatric: He has a normal mood and affect. His behavior is normal. Judgment and thought content normal. His mood appears not anxious.   Depressed mood   Nursing note and vitals reviewed.      Laboratory:  Immunosuppressants         Stop Route Frequency     cycloSPORINE (NEORAL) 100 mg/mL microemulsion solution 200 mg      -- Oral 2 times daily        CBC:     Recent Labs  Lab 02/18/18  0505   WBC 4.26   RBC 2.59*   HGB 7.9*   HCT 24.3*   *   MCV 94   MCH 30.5   MCHC 32.5     CMP:     Recent Labs  Lab 02/18/18  0505   GLU 76   CALCIUM 9.2   ALBUMIN 1.8*   PROT 4.9*      K 4.1   CO2 21*      BUN 20   CREATININE 4.9*   ALKPHOS 104   ALT <5*   AST 21   BILITOT 2.3*     Coagulation:     Recent Labs  Lab 02/16/18  0436   INR 1.0     Labs within the past 24 hours have been reviewed.    Diagnostic Results:  None

## 2018-02-18 NOTE — PLAN OF CARE
Problem: Patient Care Overview  Goal: Individualization & Mutuality  Outcome: Ongoing (interventions implemented as appropriate)  No acute events. VSS.  Pain medication dose increased.  One episode of emesis.  Ambulated in the halls.  Resume tube feeds this evening but NPO at midnight.  ERCP planned for tomorrow.  POC d/w patient and mother. Continue to monitor.

## 2018-02-18 NOTE — ASSESSMENT & PLAN NOTE
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+, steroid induction, on maintenance tacro/MMF/pred; c/b seizures, LAURA, superificial wound infection s/p wound vac to [facility] through 1/12, and several recent admissions on 11/24 and 12/3 with culture negative peritonitis with peak WBC ~5k due to suspected indolent biliary leakage s/p sphincterotomy/biliary stent placement over CBD stricture on 12/6 ERCP although notably with no overt leak noted during procedure) who was admitted on 1/11/2017 with acute onset fevers and acute on chronic N/V/abdominal pain (RLQ worst) in the setting of persistent noninflammatory ascites of unknown etiology. He underwent repeated drainage of pleural fluid and ascites that did not show evidence of gross infection and given that fevers were antibiotic responsive and walled off biloma was suspected, he underwent ex-lap on 2/8 where a walled off presumed partly bilious collection was noted in the RLQ and debrided (cultures negative). He was tapered to oral antibiotics after his procedure given negative cultures but has spiked a temperature again with a similar constellation of symptoms arguing for a similar cause. He remains tenuous.    - may continue empiric vanc/zosyn for now  - additional prophylaxis including isavuconazole per protocol  - as CT does not show additional obvious pathology, repeat cultures remain negative, and given prior history and persistently abnormal bilirubin, agree with repeat examination of his biliary system by ERCP -- appreciate assistance of KORIN

## 2018-02-18 NOTE — SUBJECTIVE & OBJECTIVE
Interval History: Fever curve showing improvement on broad spectrum coverage.  Feels slightly better today. Cultures negative to date.    Review of Systems   Constitutional: Positive for activity change, appetite change, fatigue and fever. Negative for chills.   HENT: Negative.  Negative for congestion and facial swelling.    Eyes: Negative for pain, discharge and visual disturbance.   Respiratory: Negative for cough, chest tightness, shortness of breath and wheezing.    Cardiovascular: Negative for chest pain, palpitations and leg swelling.   Gastrointestinal: Positive for abdominal distention, abdominal pain and nausea. Negative for constipation and vomiting.   Endocrine: Negative.    Genitourinary: Positive for decreased urine volume. Negative for difficulty urinating.        Anuric   Musculoskeletal: Negative for arthralgias, back pain and myalgias.   Skin: Positive for color change and wound. Negative for rash.   Allergic/Immunologic: Positive for immunocompromised state.   Neurological: Negative for dizziness, seizures, weakness and headaches.   Psychiatric/Behavioral: Positive for decreased concentration and dysphoric mood. Negative for confusion. The patient is not nervous/anxious.    All other systems reviewed and are negative.      Objective:     Vital Signs (Most Recent):  Temp: 99.2 °F (37.3 °C) (02/17/18 1542)  Pulse: 90 (02/17/18 1542)  Resp: 16 (02/17/18 1542)  BP: 135/85 (02/17/18 1542)  SpO2: 95 % (02/17/18 1542) Vital Signs (24h Range):  Temp:  [98.6 °F (37 °C)-101.2 °F (38.4 °C)] 99.2 °F (37.3 °C)  Pulse:  [84-92] 90  Resp:  [16-20] 16  SpO2:  [92 %-96 %] 95 %  BP: (128-137)/(75-85) 135/85     Weight: 60.2 kg (132 lb 11.5 oz)  Body mass index is 21.42 kg/m².    Estimated Creatinine Clearance: 26.8 mL/min (A) (based on SCr of 3.5 mg/dL (H)).    Physical Exam   Constitutional: He is oriented to person, place, and time. He appears well-developed. No distress.   Temporal and distal extremity muscle  wasting   HENT:   Head: Normocephalic and atraumatic.   Mouth/Throat: No oropharyngeal exudate.   dylan tube   Eyes: EOM are normal. Pupils are equal, round, and reactive to light. No scleral icterus.   Neck: Normal range of motion. Neck supple. No JVD present. No thyromegaly present.   Cardiovascular: Normal rate, regular rhythm, normal heart sounds and intact distal pulses.    No murmur heard.  Pulmonary/Chest: Effort normal. No respiratory distress. He has no wheezes. He exhibits no tenderness.   Diminished to RLL   Abdominal: Soft. Bowel sounds are normal. He exhibits ascites. He exhibits no distension. There is tenderness. There is no rebound and no guarding.   Dressing to chevron.  Wd vac removed 1/12/18  Dependent edema present R flank; surrounding erythema   Musculoskeletal: Normal range of motion. He exhibits no tenderness. Edema: 2+ LE edema.   Neurological: He is alert and oriented to person, place, and time. He has normal reflexes.   Skin: Skin is warm and dry. He is not diaphoretic. No erythema.   Psychiatric: His mood appears not anxious.   Nursing note and vitals reviewed.      Significant Labs:   CBC:     Recent Labs  Lab 02/16/18  0436 02/17/18  0343   WBC 2.85* 2.23*   HGB 8.8* 7.8*   HCT 26.8* 23.9*    134*     CMP:     Recent Labs  Lab 02/16/18  0436 02/17/18  0343   * 137   K 4.0 3.8    105   CO2 23 23   GLU 84 81   BUN 23* 13   CREATININE 5.1* 3.5*   CALCIUM 9.5 8.8   PROT 5.6* 5.1*   ALBUMIN 2.0* 1.8*   BILITOT 2.2* 2.1*   ALKPHOS 125 111   AST 25 18   ALT <5* <5*   ANIONGAP 11 9   EGFRNONAA 14.2* 22.4*       Significant Imaging: I have reviewed all pertinent imaging results/findings within the past 24 hours.     CT CAP:   Post surgical changes prior hepatic transplant.  Percutaneous surgical drain with notable decrease in size of fluid collection in lower abdomen.  Small amount of fluid tracking elsewhere within the abdomen and pelvis.  Large volume right pleural fluid,  increased in size since prior exam, with associated right lower lobe atelectasis.  Diffuse body wall edema.  Small pericardial effusion.    Microbiology:  1/11 blood cx: negative  1/11 pleural fluid cx: negative  1/11 ascites cx: negative  1/17 blood cx: negative  1/18 blood cx: negative  1/19 pleural fluid cx: NGTD  1/19 ascites cx: NGTD  2/1 RVP negative  2/7 OR cx: NGTD  2/11 blood cx: negative  2/15 blood cx: negative  2/16 pleural fluid cx: negative

## 2018-02-18 NOTE — PROGRESS NOTES
[facility]  Liver Transplant  Progress Note    Patient Name: Jhonny Diana  MRN: [id number]  Admission Date: 2018  Hospital Length of Stay: 38 days  Code Status: Full Code  Primary Care Provider: Primary Doctor No  Post-Operative Day: 122    ORGAN:   LIVER  Disease Etiology: Acute Alcoholic Hepatitis  Donor Type:    - Brain Death  CDC High Risk:   No  Donor CMV Status:   Donor CMV Status: Positive  Donor HBcAB:   Negative  Donor HCV Status:   Negative  Whole or Partial: Whole Liver  Biliary Anastomosis: End to End  Arterial Anatomy: Standard  Subjective:     History of Present Illness:  Jhonny Diana is a 28 y/o male with past medical history of alcoholic cirrhosis.  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 Found to have peritonitis in November (WBC 5000, 75% PNM) neg for bile leak. He was treated initially with vanc/cefepime. Repeat cell counts  with some improvement (WBC 1400, 45% PNM). He has undergone multiple paracenteses as well as abscess drainage of perihepatic fluid collections and treated with antimicrobial therapy but no positive cultures. Of note, biliary stricture also identified and ERCP performed on 2017 with sphincterotomy and biliary stent placed. Liver tests still have not normalized despite intervention, bilirubin and AP remain elevated. Other pertinent PMH current wound vac in place 2/2 wound infection, malnutrition requiring TPN for short course and ongoing hypoalbuminemia, and seizure activity while on prograf and has since been switched to cyclosporine without reoccurrence.  He presented to the ER for fever, abdominal pain, and N/V. He reports fever (103) for 1  day prior. Overnight, he developed N/V, reports small amount of green emesis with new left sided pain. He also endorses worsening SOB with exertion. He was scheduled as an outpatient for follow up paracentesis and IR drainage of fluid collection. CXR in ER shows large pleural effusion with subsegmental atelectasis. Infectious work up initiated in ER. His ANC is 900. Broad spectrum antibiotics initiated in ED. He denies chest pain, palpitations, diarrhea, constipation, or back pain. Denies any sick contacts.    Hospital Course:  Thoracentesis (1.2L off), Paracentesis (1.6L off), and IR drainage of fluid collection 1/11, all fluids negative for infection. ID consulted. Broad spectrum antibiotics d/c'd 1/15. Chronically malnourished with poor PO intake, prealbumin 7. Shane tube placed 1/15 for tube feedings.     ERCP 1/17 with sludge and a biliary stone which was removed and stent exchanged.  Remained with n/v; therefore, TF remained on hold and TPN continued.   Pt with fever s/p ERCP on 1/17 which continued until 1/19.  Vanc/cefepime restarted.  Blood cx 1/17 and 1/18 NGTD.  ID reconsulted.  Fungal markers sent.  CT C/A/P obtained.  With large R pleural effusion and ascites- both drained 1/19 and negative for infx per cell count. Pt afebrile 1/20. Liver biopsy 1/23 - without rejection.  EGD 1/25 - unremarkable for source of GI symptoms.  Resume diet along with other GI recommendations. SHANE tube placed for tube feeds.       Interval History: No acute events overnight. Tmax 100.4. Blood cx remain with NGTD. Patient more awake today but remains feeling unwell with abdominal pain. PBS consulted for possible ERCP. Bilirubin with slight increase today, 2.3. Given continued fevers despite negative infectious work up, will make NPO at midnight for potential ERCP tomorrow. Hold tube feeds at midnight. Cont broad spectrum antibiotics. Cont to monitor closely.     Scheduled Meds:   atovaquone  1,500 mg Oral Daily    cycloSPORINE   200 mg Oral BID    docusate sodium  100 mg Oral BID    epoetin maurisio (PROCRIT) injection  10,000 Units Intravenous Every Mon, Wed, Fri    heparin (porcine)  5,000 Units Subcutaneous Q8H    custom IVPB builder   Intravenous Q24H    levetiracetam oral soln  500 mg Oral BID    levothyroxine  75 mcg Oral Before breakfast    metoclopramide HCl  5 mg Oral QID (AC & HS)    multivitamin  1 tablet Oral Daily    omeprazole  40 mg Oral QAM    piperacillin-tazobactam (ZOSYN) IVPB  4.5 g Intravenous Q12H    polyethylene glycol  17 g Oral BID    psyllium husk (aspartame)  3.4 g Oral BID    ursodiol  300 mg Oral TID    [START ON 2/19/2018] valganciclovir 50 mg/ml  100 mg Oral Every Mon, Wed, Fri     Continuous Infusions:  PRN Meds:sodium chloride, sodium chloride 0.9%, acetaminophen, albuterol-ipratropium 2.5mg-0.5mg/3mL, bisacodyl, heparin (porcine), omnipaque, ondansetron, ondansetron, oxyCODONE, prochlorperazine, simethicone, sodium chloride 0.9%, sodium chloride 0.9%    Review of Systems   Constitutional: Positive for activity change, appetite change, fatigue and fever. Negative for chills.   HENT: Negative.  Negative for congestion and facial swelling.    Eyes: Negative for pain, discharge and visual disturbance.   Respiratory: Negative for cough, chest tightness, shortness of breath and wheezing.    Cardiovascular: Negative for chest pain, palpitations and leg swelling.   Gastrointestinal: Positive for abdominal distention, abdominal pain and nausea. Negative for constipation and vomiting.   Endocrine: Negative.    Genitourinary: Positive for decreased urine volume. Negative for difficulty urinating.        Anuric   Musculoskeletal: Negative for arthralgias, back pain and myalgias.   Skin: Positive for color change and wound. Negative for rash.   Allergic/Immunologic: Positive for immunocompromised state.   Neurological: Negative for dizziness, seizures, weakness and headaches.   Psychiatric/Behavioral:  Positive for decreased concentration and dysphoric mood. Negative for confusion. The patient is not nervous/anxious.    All other systems reviewed and are negative.    Objective:     Vital Signs (Most Recent):  Temp: 98.9 °F (37.2 °C) (02/18/18 1218)  Pulse: 90 (02/18/18 1218)  Resp: 17 (02/18/18 1218)  BP: 130/76 (02/18/18 1218)  SpO2: 95 % (02/18/18 1218) Vital Signs (24h Range):  Temp:  [98.8 °F (37.1 °C)-100.4 °F (38 °C)] 98.9 °F (37.2 °C)  Pulse:  [81-96] 90  Resp:  [16-20] 17  SpO2:  [95 %-97 %] 95 %  BP: (128-140)/(76-85) 130/76     Weight: 61.1 kg (134 lb 11.2 oz)  Body mass index is 21.74 kg/m².    Intake/Output - Last 3 Shifts       02/16 0700 - 02/17 0659 02/17 0700 - 02/18 0659 02/18 0700 - 02/19 0659    P.O. 240 480     Other 600      NG/GT  220 150    IV Piggyback  450     Total Intake(mL/kg) 840 (14) 1150 (18.8) 150 (2.5)    Urine (mL/kg/hr) 0 (0)      Emesis/NG output 0 (0)      Other 3100 (2.1)      Stool 0 (0)      Blood 0 (0)      Total Output 3100        Net -2260 +1150 +150           Urine Occurrence 0 x 0 x     Stool Occurrence 0 x 2 x     Emesis Occurrence 0 x            Physical Exam   Constitutional: He is oriented to person, place, and time. He appears well-developed. No distress.   Temporal and distal extremity muscle wasting   HENT:   Head: Normocephalic and atraumatic.   Mouth/Throat: No oropharyngeal exudate.   dylan tube   Eyes: EOM are normal. Pupils are equal, round, and reactive to light. Scleral icterus is present.   Neck: Normal range of motion. Neck supple. No JVD present. No thyromegaly present.   Cardiovascular: Normal rate, regular rhythm, normal heart sounds and intact distal pulses.    No murmur heard.  Pulmonary/Chest: Effort normal. No respiratory distress. He has decreased breath sounds in the right middle field, the right lower field and the left lower field. He has no wheezes. He exhibits no tenderness.   Diminished to RLL   Abdominal: Soft. Bowel sounds are normal. He  exhibits ascites. He exhibits no distension. There is tenderness. There is no rebound and no guarding.   Dressing to chevron.  Wd vac removed 1/12/18  Dependent edema present R flank; surrounding erythema   Musculoskeletal: Normal range of motion. He exhibits edema (2+ LE edema). He exhibits no tenderness.   Neurological: He is alert and oriented to person, place, and time. He has normal reflexes.   Skin: Skin is warm and dry. Capillary refill takes 2 to 3 seconds. He is not diaphoretic. No erythema.   jaundice   Psychiatric: He has a normal mood and affect. His behavior is normal. Judgment and thought content normal. His mood appears not anxious.   Depressed mood   Nursing note and vitals reviewed.      Laboratory:  Immunosuppressants         Stop Route Frequency     cycloSPORINE (NEORAL) 100 mg/mL microemulsion solution 200 mg      -- Oral 2 times daily        CBC:     Recent Labs  Lab 02/18/18  0505   WBC 4.26   RBC 2.59*   HGB 7.9*   HCT 24.3*   *   MCV 94   MCH 30.5   MCHC 32.5     CMP:     Recent Labs  Lab 02/18/18  0505   GLU 76   CALCIUM 9.2   ALBUMIN 1.8*   PROT 4.9*      K 4.1   CO2 21*      BUN 20   CREATININE 4.9*   ALKPHOS 104   ALT <5*   AST 21   BILITOT 2.3*     Coagulation:     Recent Labs  Lab 02/16/18  0436   INR 1.0     Labs within the past 24 hours have been reviewed.    Diagnostic Results:  None    Assessment/Plan:     Other cytomegaloviral diseases    - detected at 370 on CMV PCR 2/1.  - case discussed with ID and will hold off on treatment at this time given low WBC.   - undetected CMV PCR 2/8.  - continue Valcyte 200 mg every Mon, Wed, Fri.  -CMV PCR 2/15 negative  -Per ID, valcyte dose decreased to ppx dose on 2/18.        Fever    - Started on vanc/cefepime for ERCP. Now transitioned to vanc/zosyn.  - Blood cultures sent, prelim NGTD. Repeat blood cultures and chest xray today 2/10.  - thoracentesis 2/1 - fluid negative for infection.  - atelectasis noted on ct scan -  start breathing tx & CPT.  - now cmv positive - started treatment 2/5/18.  - taken to OR 2/7 for exp lap - fluid collections drained, cultures pending.  - Patient with low grade fever 2/15, 100.3.  -CT A/P 2/15 with no evidence of IA fluid infection. Did show R pleural effusion s/p thoracentesis, cell count negative for infection, cx pending.  -Repeat blood cx 2/15 prelim NGTD, CMV PCR 2/15 negative   Complaining of abdominal pain. Is tender in RUQ.  -Remains with fevers  - Bilirubin remains elevated. Concerning for cholangitis. Antibiotics broadened to Zosyn, Vanc 2/17. ID re consulted.   -PBS consulted for possible ERCP.  - Given continued fevers despite negative infectious work up, will make NPO at midnight for potential ERCP tomorrow. Hold tube feeds at midnight. Cont broad spectrum antibiotics.          Constipation    - Enema ordered 1/20 and 1/21 with BM.  - Encourage ambulation.  - decrease narcotics.  - d/c metamucil and miralax bid as makes patient nauseated.  - increase reglan qid.  - small bowel follow through with normal findings.   - patient reports bowel movement 2/13        Other ascites    - Paracentesis performed 1/11/18 with 1600 ml removed.    - fluid negative for infection.   - Repeat para 1/19 negative for infection per cell count.  - no fluid seen for paracentesis on 1/23.        Severe protein-calorie malnutrition    - Poor PO intake since transplant requiring short course of TPN during previous hospital stay.   - albumin remains decreased but appetite slowly improving per pt.   - Dietary consulted. Will need to closely monitor PO intake.   - supplements also ordered.   - SHANE with tube feeding start 1/16. Stopped 1/17/18, was not tolerating. Shane removed 1/20.  - prealbumin 7 on 1/15.  - TPN started 1/16/18.   - Pt able to eat minimally 1/21 and 1/22 which is an improvement.  - gi consulted - EGD reviewed, increased ppi bid, miralax bid, and metamucil bid, decreased narcotics.  - continue  reglan, placed SHANE tube with tube feeds - transitioned to nightly feeds. Not tolerating well due to nausea.   - small bowel follow through with normal findings.   - consulted gi for possible gj tube placement  - Marinol started with some improvement in appetite seen.   -Will monitor through the weekend with possible plan for gj tube next week.           Nausea and vomiting    - h/o constipation, reports having regular BM's with bowel regimen.  - vomiting episode x 1 at home. Anti-emetics ordered PRN.  - KUB repeated 1/14/18 given increased abdominal pain- mild, generalized bowel distention suggesting ileus.  Diet changed to clears as tolerated for bowel rest .  - SHANE tube placed 1/16 for tube feeds. Not tolerating tube feeds, stopped 1/17/18.  - D/C shane 1/19, now in place as of 1/25.  - chronic constipation, Cont bowel regimen.   - GI consulted. See protein calorie malnutrition.  - not tolerating TFs due to nausea. Holding periodically overnight.         Recurrent pleural effusion on right    - CXR in ER with large right pleural effusion with subjective c/o worsening SOB  - thoracentesis completed- 1200 ml removed.  SOB with significant improvement.   - Cell count reviewed and negative for infection.   - resp even and non labored.  O2 sat 94-98%.  - Repeat thora 1/19 negative for infection per cell count.  - Reaccumulating fluid on xray 1/21.  - thoracentesis 1/31 - 1.4L removed, negative for infection  - Reaccumulating fluid again on xray 2/10.   -Thoracentesis performed  2/16 with 1500 cc off, cell count neg for infection, cx pending.          Biliary stricture of transplanted liver    - tbili with increase prompting ERCP 1/30 with stones and sludge, stents placed.  - T bili remains elevated. Along with ongoing fevers, abdominal pain. PBS consulted for possible ERCP. NPO at midnight for possible ERCP tomorrow.         Anemia of chronic disease    - H/H stable. Cont to monitor with daily cbc.         Delayed  surgical wound healing    - wd vac removed 1/12/18.  Wound healing well. Aquacel AG and Mepilex border dressing applied via wound care recs.           At risk for opportunistic infections    - CMV detected 2/1 now on tx dose valcyte. CMV PCR 2/15 not detected.   - bactrim held.            Long-term use of immunosuppressant medication    - Maintenance IS with cyclosporine. MMF on hold for infections and neutropenia. Continue to check cyclosporine level daily. Assess for toxicity and adjust level as needed.        Prophylactic immunotherapy    - See long term use of immunosuppression.         Liver transplanted    - Post op course complicated by fevers and hyperbilirubinemia.  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  - AST/ALT normal. Tbili/alkphos remain elevated.  - Liver US 1/8 showed 3.9 cm complex fluid collection anterior to right lobe and moderate nonspecific complex ascites inferior to transplant.   - IR placed drain 1/11/18, cell count negative for infection.  - PBS consulted. PBS not comfortable proceeding with EUS with liver biopsy given fever and possible infectious process going on. ERCP 1/17 with stone and sludge. Pt placed on Actigall - treated with 10 day course of abx.  ID now signed off.  - Bili elevated - liver u/s 1/28  - Liver biopsy 1/23 without rejection.  - consulted PBS for ERCP - Choledocholithiasis was found along with sludge, stents placed 1/30.  - T bili remains elevated with ongoing fever. NPO at midnight for possible ERCP tomorrow.           Acute renal failure with tubular necrosis    - HD resumed on previous admission. Now anuric and dialyzes M-W-F.  - Nephrology following.    - ktm consulted for possible kidney transplant.            VTE Risk Mitigation         Ordered     heparin (porcine) injection 1,000 Units  As needed (PRN)     Route:  Intra-Catheter        02/05/18 0965     heparin (porcine) injection 5,000 Units  Every 8 hours     Route:  Subcutaneous         01/31/18 0957     Medium Risk of VTE  Once      01/11/18 0351     Place sequential compression device  Until discontinued      01/11/18 0351          The patients clinical status was discussed at multidisplinary rounds, involving transplant surgery, transplant medicine, pharmacy, nursing, nutrition, and social work    Discharge Planning: Not a candidate for discharge at this time.       Citlali Bridges PA-C  Liver Transplant  [facility]

## 2018-02-18 NOTE — ASSESSMENT & PLAN NOTE
- tbili with increase prompting ERCP 1/30 with stones and sludge, stents placed.  - T bili remains elevated. Along with ongoing fevers, abdominal pain. PBS consulted for possible ERCP. NPO at midnight for possible ERCP tomorrow.

## 2018-02-18 NOTE — PROGRESS NOTES
[facility]  Infectious Disease  Progress Note    Patient Name: Jhonny Diana  MRN: [id number]  Admission Date: 1/11/2018  Length of Stay: 38 days  Attending Physician: Nathanael Medina MD  Primary Care Provider: Primary Doctor No    Isolation Status: No active isolations  Assessment/Plan:      Fever    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, CMV D+/R+, steroid induction, on maintenance tacro/MMF/pred; c/b seizures, LAURA, superificial wound infection s/p wound vac to [facility] through 1/12, and several recent admissions on 11/24 and 12/3 with culture negative peritonitis with peak WBC ~5k due to suspected indolent biliary leakage s/p sphincterotomy/biliary stent placement over CBD stricture on 12/6 ERCP although notably with no overt leak noted during procedure) who was admitted on 1/11/2017 with acute onset fevers and acute on chronic N/V/abdominal pain (RLQ worst) in the setting of persistent noninflammatory ascites of unknown etiology. He underwent repeated drainage of pleural fluid and ascites that did not show evidence of gross infection and given that fevers were antibiotic responsive and walled off biloma was suspected, he underwent ex-lap on 2/8 where a walled off presumed partly bilious collection was noted in the RLQ and debrided (cultures negative). He was tapered to oral antibiotics after his procedure given negative cultures but has spiked a temperature again with a similar constellation of symptoms arguing for a similar cause. He remains tenuous.    - may continue empiric vanc/zosyn for now  - additional prophylaxis including isavuconazole per protocol  - as CT does not show additional obvious pathology, repeat cultures remain negative, and given prior history and persistently abnormal bilirubin, agree with repeat examination of his biliary system by ERCP -- appreciate assistance of PBS              Anticipated Disposition:  pending improvement    Thank you for your consult. I will follow-up with patient. Please contact us if you have any additional questions.     Melanie Bergman MD  Transplant ID Attending  [id number]    Melanie Bergman MD  Infectious Disease  [facility]    Subjective:     Principal Problem:Fever    HPI: No notes on file  Interval History: Fever curve showing improvement on broad spectrum coverage.  Feels slightly better today. Cultures negative to date.    Review of Systems   Constitutional: Positive for activity change, appetite change, fatigue and fever. Negative for chills.   HENT: Negative.  Negative for congestion and facial swelling.    Eyes: Negative for pain, discharge and visual disturbance.   Respiratory: Negative for cough, chest tightness, shortness of breath and wheezing.    Cardiovascular: Negative for chest pain, palpitations and leg swelling.   Gastrointestinal: Positive for abdominal distention, abdominal pain and nausea. Negative for constipation and vomiting.   Endocrine: Negative.    Genitourinary: Positive for decreased urine volume. Negative for difficulty urinating.        Anuric   Musculoskeletal: Negative for arthralgias, back pain and myalgias.   Skin: Positive for color change and wound. Negative for rash.   Allergic/Immunologic: Positive for immunocompromised state.   Neurological: Negative for dizziness, seizures, weakness and headaches.   Psychiatric/Behavioral: Positive for decreased concentration and dysphoric mood. Negative for confusion. The patient is not nervous/anxious.    All other systems reviewed and are negative.      Objective:     Vital Signs (Most Recent):  Temp: 99.2 °F (37.3 °C) (02/17/18 1542)  Pulse: 90 (02/17/18 1542)  Resp: 16 (02/17/18 1542)  BP: 135/85 (02/17/18 1542)  SpO2: 95 % (02/17/18 1542) Vital Signs (24h Range):  Temp:  [98.6 °F (37 °C)-101.2 °F (38.4 °C)] 99.2 °F (37.3 °C)  Pulse:  [84-92] 90  Resp:  [16-20] 16  SpO2:  [92 %-96 %] 95  %  BP: (128-137)/(75-85) 135/85     Weight: 60.2 kg (132 lb 11.5 oz)  Body mass index is 21.42 kg/m².    Estimated Creatinine Clearance: 26.8 mL/min (A) (based on SCr of 3.5 mg/dL (H)).    Physical Exam   Constitutional: He is oriented to person, place, and time. He appears well-developed. No distress.   Temporal and distal extremity muscle wasting   HENT:   Head: Normocephalic and atraumatic.   Mouth/Throat: No oropharyngeal exudate.   dylan tube   Eyes: EOM are normal. Pupils are equal, round, and reactive to light. No scleral icterus.   Neck: Normal range of motion. Neck supple. No JVD present. No thyromegaly present.   Cardiovascular: Normal rate, regular rhythm, normal heart sounds and intact distal pulses.    No murmur heard.  Pulmonary/Chest: Effort normal. No respiratory distress. He has no wheezes. He exhibits no tenderness.   Diminished to RLL   Abdominal: Soft. Bowel sounds are normal. He exhibits ascites. He exhibits no distension. There is tenderness. There is no rebound and no guarding.   Dressing to chevron.  Wd vac removed 1/12/18  Dependent edema present R flank; surrounding erythema   Musculoskeletal: Normal range of motion. He exhibits no tenderness. Edema: 2+ LE edema.   Neurological: He is alert and oriented to person, place, and time. He has normal reflexes.   Skin: Skin is warm and dry. He is not diaphoretic. No erythema.   Psychiatric: His mood appears not anxious.   Nursing note and vitals reviewed.      Significant Labs:   CBC:     Recent Labs  Lab 02/16/18  0436 02/17/18  0343   WBC 2.85* 2.23*   HGB 8.8* 7.8*   HCT 26.8* 23.9*    134*     CMP:     Recent Labs  Lab 02/16/18  0436 02/17/18  0343   * 137   K 4.0 3.8    105   CO2 23 23   GLU 84 81   BUN 23* 13   CREATININE 5.1* 3.5*   CALCIUM 9.5 8.8   PROT 5.6* 5.1*   ALBUMIN 2.0* 1.8*   BILITOT 2.2* 2.1*   ALKPHOS 125 111   AST 25 18   ALT <5* <5*   ANIONGAP 11 9   EGFRNONAA 14.2* 22.4*       Significant Imaging: I have  reviewed all pertinent imaging results/findings within the past 24 hours.     CT CAP:   Post surgical changes prior hepatic transplant.  Percutaneous surgical drain with notable decrease in size of fluid collection in lower abdomen.  Small amount of fluid tracking elsewhere within the abdomen and pelvis.  Large volume right pleural fluid, increased in size since prior exam, with associated right lower lobe atelectasis.  Diffuse body wall edema.  Small pericardial effusion.    Microbiology:  1/11 blood cx: negative  1/11 pleural fluid cx: negative  1/11 ascites cx: negative  1/17 blood cx: negative  1/18 blood cx: negative  1/19 pleural fluid cx: NGTD  1/19 ascites cx: NGTD  2/1 RVP negative  2/7 OR cx: NGTD  2/11 blood cx: negative  2/15 blood cx: negative  2/16 pleural fluid cx: negative

## 2018-02-18 NOTE — ASSESSMENT & PLAN NOTE
- Started on vanc/cefepime for ERCP. Now transitioned to vanc/zosyn.  - Blood cultures sent, prelim NGTD. Repeat blood cultures and chest xray today 2/10.  - thoracentesis 2/1 - fluid negative for infection.  - atelectasis noted on ct scan - start breathing tx & CPT.  - now cmv positive - started treatment 2/5/18.  - taken to OR 2/7 for exp lap - fluid collections drained, cultures pending.  - Patient with low grade fever 2/15, 100.3.  -CT A/P 2/15 with no evidence of IA fluid infection. Did show R pleural effusion s/p thoracentesis, cell count negative for infection, cx pending.  -Repeat blood cx 2/15 prelim NGTD, CMV PCR 2/15 negative   Complaining of abdominal pain. Is tender in RUQ.  -Remains with fevers  - Bilirubin remains elevated. Concerning for cholangitis. Antibiotics broadened to Zosyn, Vanc 2/17. ID re consulted.   -PBS consulted for possible ERCP.  - Given continued fevers despite negative infectious work up, will make NPO at midnight for potential ERCP tomorrow. Hold tube feeds at midnight. Cont broad spectrum antibiotics.

## 2018-02-19 ENCOUNTER — TELEPHONE (OUTPATIENT)
Dept: TRANSPLANT | Facility: CLINIC | Age: 28
End: 2018-02-19

## 2018-02-19 ENCOUNTER — ANESTHESIA (OUTPATIENT)
Dept: ENDOSCOPY | Facility: HOSPITAL | Age: 28
DRG: 628 | End: 2018-02-19
Payer: COMMERCIAL

## 2018-02-19 ENCOUNTER — ANESTHESIA EVENT (OUTPATIENT)
Dept: ENDOSCOPY | Facility: HOSPITAL | Age: 28
DRG: 628 | End: 2018-02-19
Payer: COMMERCIAL

## 2018-02-19 LAB
ALBUMIN SERPL BCP-MCNC: 1.7 G/DL
ALP SERPL-CCNC: 93 U/L
ALT SERPL W/O P-5'-P-CCNC: <5 U/L
ANION GAP SERPL CALC-SCNC: 12 MMOL/L
ANISOCYTOSIS BLD QL SMEAR: SLIGHT
AST SERPL-CCNC: 19 U/L
BACTERIA FLD AEROBE CULT: NO GROWTH
BASOPHILS # BLD AUTO: ABNORMAL K/UL
BASOPHILS NFR BLD: 1 %
BILIRUB SERPL-MCNC: 2.1 MG/DL
BUN SERPL-MCNC: 26 MG/DL
CALCIUM SERPL-MCNC: 9.4 MG/DL
CHLORIDE SERPL-SCNC: 104 MMOL/L
CO2 SERPL-SCNC: 21 MMOL/L
CREAT SERPL-MCNC: 5.7 MG/DL
CYCLOSPORINE BLD LC/MS/MS-MCNC: 324 NG/ML
DIFFERENTIAL METHOD: ABNORMAL
EOSINOPHIL # BLD AUTO: ABNORMAL K/UL
EOSINOPHIL NFR BLD: 2 %
ERYTHROCYTE [DISTWIDTH] IN BLOOD BY AUTOMATED COUNT: 15.6 %
EST. GFR  (AFRICAN AMERICAN): 14.4 ML/MIN/1.73 M^2
EST. GFR  (NON AFRICAN AMERICAN): 12.4 ML/MIN/1.73 M^2
GLUCOSE SERPL-MCNC: 58 MG/DL
GRAM STN SPEC: NORMAL
GRAM STN SPEC: NORMAL
HAV IGM SERPL QL IA: NEGATIVE
HBV CORE AB SERPL QL IA: NEGATIVE
HBV SURFACE AB SER-ACNC: NEGATIVE M[IU]/ML
HBV SURFACE AG SERPL QL IA: NEGATIVE
HCT VFR BLD AUTO: 23.8 %
HGB BLD-MCNC: 7.8 G/DL
HYPOCHROMIA BLD QL SMEAR: ABNORMAL
IMM GRANULOCYTES # BLD AUTO: ABNORMAL K/UL
IMM GRANULOCYTES NFR BLD AUTO: ABNORMAL %
LYMPHOCYTES # BLD AUTO: ABNORMAL K/UL
LYMPHOCYTES NFR BLD: 43 %
MAGNESIUM SERPL-MCNC: 1.7 MG/DL
MCH RBC QN AUTO: 31.7 PG
MCHC RBC AUTO-ENTMCNC: 32.8 G/DL
MCV RBC AUTO: 97 FL
METAMYELOCYTES NFR BLD MANUAL: 1 %
MONOCYTES # BLD AUTO: ABNORMAL K/UL
MONOCYTES NFR BLD: 12 %
NEUTROPHILS NFR BLD: 39 %
NEUTS BAND NFR BLD MANUAL: 2 %
NRBC BLD-RTO: 0 /100 WBC
OVALOCYTES BLD QL SMEAR: ABNORMAL
PATH INTERP FLD-IMP: NORMAL
PHOSPHATE SERPL-MCNC: 3.6 MG/DL
PLATELET # BLD AUTO: 128 K/UL
PMV BLD AUTO: 11 FL
POCT GLUCOSE: 67 MG/DL (ref 70–110)
POCT GLUCOSE: 67 MG/DL (ref 70–110)
POCT GLUCOSE: 87 MG/DL (ref 70–110)
POCT GLUCOSE: 96 MG/DL (ref 70–110)
POIKILOCYTOSIS BLD QL SMEAR: SLIGHT
POLYCHROMASIA BLD QL SMEAR: ABNORMAL
POTASSIUM SERPL-SCNC: 4.3 MMOL/L
PREALB SERPL-MCNC: 8 MG/DL
PROT SERPL-MCNC: 4.9 G/DL
RBC # BLD AUTO: 2.46 M/UL
SODIUM SERPL-SCNC: 137 MMOL/L
WBC # BLD AUTO: 3.76 K/UL

## 2018-02-19 PROCEDURE — 63600175 PHARM REV CODE 636 W HCPCS: Performed by: PHYSICIAN ASSISTANT

## 2018-02-19 PROCEDURE — 74328 X-RAY BILE DUCT ENDOSCOPY: CPT | Performed by: INTERNAL MEDICINE

## 2018-02-19 PROCEDURE — 99233 SBSQ HOSP IP/OBS HIGH 50: CPT | Mod: ,,, | Performed by: PHYSICIAN ASSISTANT

## 2018-02-19 PROCEDURE — 43276 ERCP STENT EXCHANGE W/DILATE: CPT | Performed by: INTERNAL MEDICINE

## 2018-02-19 PROCEDURE — 37000008 HC ANESTHESIA 1ST 15 MINUTES: Performed by: INTERNAL MEDICINE

## 2018-02-19 PROCEDURE — 27202127 HC STENT INTRODUCER: Performed by: INTERNAL MEDICINE

## 2018-02-19 PROCEDURE — 36415 COLL VENOUS BLD VENIPUNCTURE: CPT

## 2018-02-19 PROCEDURE — 63600175 PHARM REV CODE 636 W HCPCS: Performed by: NURSE PRACTITIONER

## 2018-02-19 PROCEDURE — 63600175 PHARM REV CODE 636 W HCPCS: Mod: JG | Performed by: NURSE PRACTITIONER

## 2018-02-19 PROCEDURE — 74328 X-RAY BILE DUCT ENDOSCOPY: CPT | Mod: 26,,, | Performed by: INTERNAL MEDICINE

## 2018-02-19 PROCEDURE — 25000003 PHARM REV CODE 250: Performed by: NURSE PRACTITIONER

## 2018-02-19 PROCEDURE — 90935 HEMODIALYSIS ONE EVALUATION: CPT | Mod: ,,, | Performed by: INTERNAL MEDICINE

## 2018-02-19 PROCEDURE — 25000003 PHARM REV CODE 250: Performed by: INTERNAL MEDICINE

## 2018-02-19 PROCEDURE — 84134 ASSAY OF PREALBUMIN: CPT

## 2018-02-19 PROCEDURE — 80158 DRUG ASSAY CYCLOSPORINE: CPT

## 2018-02-19 PROCEDURE — 85007 BL SMEAR W/DIFF WBC COUNT: CPT

## 2018-02-19 PROCEDURE — 25000003 PHARM REV CODE 250: Performed by: SURGERY

## 2018-02-19 PROCEDURE — 85027 COMPLETE CBC AUTOMATED: CPT

## 2018-02-19 PROCEDURE — 0FJB8ZZ INSPECTION OF HEPATOBILIARY DUCT, VIA NATURAL OR ARTIFICIAL OPENING ENDOSCOPIC: ICD-10-PCS | Performed by: INTERNAL MEDICINE

## 2018-02-19 PROCEDURE — 82962 GLUCOSE BLOOD TEST: CPT | Performed by: INTERNAL MEDICINE

## 2018-02-19 PROCEDURE — 25000003 PHARM REV CODE 250: Mod: NTX | Performed by: NURSE ANESTHETIST, CERTIFIED REGISTERED

## 2018-02-19 PROCEDURE — C2617 STENT, NON-COR, TEM W/O DEL: HCPCS | Performed by: INTERNAL MEDICINE

## 2018-02-19 PROCEDURE — 84100 ASSAY OF PHOSPHORUS: CPT

## 2018-02-19 PROCEDURE — C1769 GUIDE WIRE: HCPCS | Performed by: INTERNAL MEDICINE

## 2018-02-19 PROCEDURE — 63600175 PHARM REV CODE 636 W HCPCS: Mod: JG | Performed by: SURGERY

## 2018-02-19 PROCEDURE — S0028 INJECTION, FAMOTIDINE, 20 MG: HCPCS | Mod: NTX | Performed by: NURSE ANESTHETIST, CERTIFIED REGISTERED

## 2018-02-19 PROCEDURE — 37000009 HC ANESTHESIA EA ADD 15 MINS: Performed by: INTERNAL MEDICINE

## 2018-02-19 PROCEDURE — D9220A PRA ANESTHESIA: Mod: NTX,,, | Performed by: ANESTHESIOLOGY

## 2018-02-19 PROCEDURE — 20600001 HC STEP DOWN PRIVATE ROOM

## 2018-02-19 PROCEDURE — 90935 HEMODIALYSIS ONE EVALUATION: CPT

## 2018-02-19 PROCEDURE — 43276 ERCP STENT EXCHANGE W/DILATE: CPT | Mod: ,,, | Performed by: INTERNAL MEDICINE

## 2018-02-19 PROCEDURE — 27202125 HC BALLOON, EXTRACTION (ANY): Performed by: INTERNAL MEDICINE

## 2018-02-19 PROCEDURE — 25000003 PHARM REV CODE 250: Performed by: PHYSICIAN ASSISTANT

## 2018-02-19 PROCEDURE — 80053 COMPREHEN METABOLIC PANEL: CPT

## 2018-02-19 PROCEDURE — 83735 ASSAY OF MAGNESIUM: CPT

## 2018-02-19 PROCEDURE — 63600175 PHARM REV CODE 636 W HCPCS: Mod: NTX | Performed by: NURSE PRACTITIONER

## 2018-02-19 PROCEDURE — 99233 SBSQ HOSP IP/OBS HIGH 50: CPT | Mod: ,,, | Performed by: INTERNAL MEDICINE

## 2018-02-19 PROCEDURE — 63600175 PHARM REV CODE 636 W HCPCS: Mod: NTX | Performed by: NURSE ANESTHETIST, CERTIFIED REGISTERED

## 2018-02-19 RX ORDER — GLUCAGON 1 MG
1 KIT INJECTION
Status: DISCONTINUED | OUTPATIENT
Start: 2018-02-19 | End: 2018-04-10 | Stop reason: HOSPADM

## 2018-02-19 RX ORDER — ROCURONIUM BROMIDE 10 MG/ML
INJECTION, SOLUTION INTRAVENOUS
Status: DISCONTINUED | OUTPATIENT
Start: 2018-02-19 | End: 2018-02-19

## 2018-02-19 RX ORDER — FENTANYL CITRATE 50 UG/ML
INJECTION, SOLUTION INTRAMUSCULAR; INTRAVENOUS
Status: DISCONTINUED | OUTPATIENT
Start: 2018-02-19 | End: 2018-02-19

## 2018-02-19 RX ORDER — CYCLOSPORINE 100 MG/ML
175 SOLUTION ORAL 2 TIMES DAILY
Status: DISCONTINUED | OUTPATIENT
Start: 2018-02-19 | End: 2018-02-23

## 2018-02-19 RX ORDER — SODIUM CHLORIDE 0.9 % (FLUSH) 0.9 %
3 SYRINGE (ML) INJECTION
Status: DISCONTINUED | OUTPATIENT
Start: 2018-02-19 | End: 2018-04-10 | Stop reason: HOSPADM

## 2018-02-19 RX ORDER — SUCCINYLCHOLINE CHLORIDE 20 MG/ML
INJECTION INTRAMUSCULAR; INTRAVENOUS
Status: DISCONTINUED | OUTPATIENT
Start: 2018-02-19 | End: 2018-02-19

## 2018-02-19 RX ORDER — FAMOTIDINE 10 MG/ML
INJECTION INTRAVENOUS
Status: DISCONTINUED | OUTPATIENT
Start: 2018-02-19 | End: 2018-02-19

## 2018-02-19 RX ORDER — LIDOCAINE HYDROCHLORIDE 10 MG/ML
INJECTION, SOLUTION INTRAVENOUS
Status: DISCONTINUED | OUTPATIENT
Start: 2018-02-19 | End: 2018-02-19

## 2018-02-19 RX ORDER — PROPOFOL 10 MG/ML
VIAL (ML) INTRAVENOUS
Status: DISCONTINUED | OUTPATIENT
Start: 2018-02-19 | End: 2018-02-19

## 2018-02-19 RX ORDER — IBUPROFEN 200 MG
24 TABLET ORAL
Status: DISCONTINUED | OUTPATIENT
Start: 2018-02-19 | End: 2018-04-10 | Stop reason: HOSPADM

## 2018-02-19 RX ORDER — IBUPROFEN 200 MG
16 TABLET ORAL
Status: DISCONTINUED | OUTPATIENT
Start: 2018-02-19 | End: 2018-04-10 | Stop reason: HOSPADM

## 2018-02-19 RX ADMIN — DEXTROSE 4.5 G: 50 INJECTION, SOLUTION INTRAVENOUS at 02:02

## 2018-02-19 RX ADMIN — HEPARIN SODIUM 1000 UNITS: 1000 INJECTION, SOLUTION INTRAVENOUS; SUBCUTANEOUS at 10:02

## 2018-02-19 RX ADMIN — VANCOMYCIN HYDROCHLORIDE 500 MG: 500 INJECTION, POWDER, LYOPHILIZED, FOR SOLUTION INTRAVENOUS at 03:02

## 2018-02-19 RX ADMIN — SODIUM CHLORIDE 400 ML: 0.9 INJECTION, SOLUTION INTRAVENOUS at 10:02

## 2018-02-19 RX ADMIN — SUCCINYLCHOLINE CHLORIDE 120 MG: 20 INJECTION, SOLUTION INTRAMUSCULAR; INTRAVENOUS at 02:02

## 2018-02-19 RX ADMIN — OXYCODONE HYDROCHLORIDE 10 MG: 5 TABLET ORAL at 05:02

## 2018-02-19 RX ADMIN — SODIUM CHLORIDE: 9 INJECTION, SOLUTION INTRAVENOUS at 12:02

## 2018-02-19 RX ADMIN — METOCLOPRAMIDE HYDROCHLORIDE 5 MG: 5 SOLUTION ORAL at 08:02

## 2018-02-19 RX ADMIN — HEPARIN SODIUM 5000 UNITS: 5000 INJECTION, SOLUTION INTRAVENOUS; SUBCUTANEOUS at 08:02

## 2018-02-19 RX ADMIN — ROCURONIUM BROMIDE 5 MG: 10 INJECTION, SOLUTION INTRAVENOUS at 02:02

## 2018-02-19 RX ADMIN — LEVETIRACETAM 500 MG: 100 SOLUTION ORAL at 08:02

## 2018-02-19 RX ADMIN — FAMOTIDINE 20 MG: 10 INJECTION, SOLUTION INTRAVENOUS at 03:02

## 2018-02-19 RX ADMIN — URSODIOL 300 MG: 300 CAPSULE ORAL at 05:02

## 2018-02-19 RX ADMIN — OMEPRAZOLE 40 MG: 40 CAPSULE, DELAYED RELEASE ORAL at 05:02

## 2018-02-19 RX ADMIN — MORPHINE 100 MG: 10 SOLUTION ORAL at 08:02

## 2018-02-19 RX ADMIN — FENTANYL CITRATE 50 MCG: 50 INJECTION, SOLUTION INTRAMUSCULAR; INTRAVENOUS at 02:02

## 2018-02-19 RX ADMIN — LIDOCAINE HYDROCHLORIDE 50 MG: 10 INJECTION, SOLUTION INTRAVENOUS at 02:02

## 2018-02-19 RX ADMIN — ROCURONIUM BROMIDE 10 MG: 10 INJECTION, SOLUTION INTRAVENOUS at 02:02

## 2018-02-19 RX ADMIN — PROPOFOL 20 MG: 10 INJECTION, EMULSION INTRAVENOUS at 03:02

## 2018-02-19 RX ADMIN — ONDANSETRON HYDROCHLORIDE 4 MG: 2 INJECTION, SOLUTION INTRAMUSCULAR; INTRAVENOUS at 02:02

## 2018-02-19 RX ADMIN — LEVOTHYROXINE SODIUM 75 MCG: 75 TABLET ORAL at 05:02

## 2018-02-19 RX ADMIN — CYCLOSPORINE 200 MG: 100 SOLUTION ORAL at 05:02

## 2018-02-19 RX ADMIN — ERYTHROPOIETIN 10000 UNITS: 10000 INJECTION, SOLUTION INTRAVENOUS; SUBCUTANEOUS at 10:02

## 2018-02-19 RX ADMIN — METOCLOPRAMIDE HYDROCHLORIDE 5 MG: 5 SOLUTION ORAL at 05:02

## 2018-02-19 RX ADMIN — CYCLOSPORINE 175 MG: 100 SOLUTION ORAL at 08:02

## 2018-02-19 RX ADMIN — DEXTROSE MONOHYDRATE 12.5 G: 500 INJECTION PARENTERAL at 08:02

## 2018-02-19 RX ADMIN — PROPOFOL 50 MG: 10 INJECTION, EMULSION INTRAVENOUS at 03:02

## 2018-02-19 RX ADMIN — PIPERACILLIN AND TAZOBACTAM 4.5 G: 4; .5 INJECTION, POWDER, LYOPHILIZED, FOR SOLUTION INTRAVENOUS; PARENTERAL at 02:02

## 2018-02-19 RX ADMIN — PROPOFOL 150 MG: 10 INJECTION, EMULSION INTRAVENOUS at 02:02

## 2018-02-19 RX ADMIN — URSODIOL 300 MG: 300 CAPSULE ORAL at 08:02

## 2018-02-19 NOTE — ASSESSMENT & PLAN NOTE
- Post op course complicated by fevers and hyperbilirubinemia.  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  - AST/ALT normal. Tbili/alkphos remain elevated.  - Liver US 1/8 showed 3.9 cm complex fluid collection anterior to right lobe and moderate nonspecific complex ascites inferior to transplant.   - IR placed drain 1/11/18, cell count negative for infection.  - PBS consulted. PBS not comfortable proceeding with EUS with liver biopsy given fever and possible infectious process going on. ERCP 1/17 with stone and sludge. Pt placed on Actigall - treated with 10 day course of abx.  ID now signed off.  - Bili elevated - liver u/s 1/28  - Liver biopsy 1/23 without rejection.  - consulted PBS for ERCP - Choledocholithiasis was found along with sludge, stents placed 1/30.  - T bili remains elevated with recent fevers.  Plan for ERCP today via PBS. Currently afebrile. Will continue broad spectrum antibiotics for now.

## 2018-02-19 NOTE — PROGRESS NOTES
[facility]  Nephrology  Progress Note    Patient Name: Jhonny Diana  MRN: [id number]  Admission Date: 1/11/2018  Hospital Length of Stay: 39 days  Attending Provider: Nathanael Medina MD   Primary Care Physician: Primary Doctor No  Principal Problem:Fever    Subjective:     HPI: Jhonny Diana is a 28 y/o  male with PMHx relevant for ETOH cirrhosis.  S/p DDLT 10/19/2017; c/b seizures (swtiched off prograf to cyclo), ATN dialysis dependant in iHD MWF, last HD on 1/10, anuric, superficial wound infection s/p wound vac to chevron incision, and multiple admissions for fevers finally dx with peritonitis in November (WBC 5000, 75% PNM) neg for bile leak. HE gets recurrent paracentesis and has peritoneal drain. Other pertinent PMH current wound vac in place 2/2 wound infection, malnutrition requiring TPN for short course and ongoing hypoalbuminemia, and seizure activity while on prograf and has since been switched to cyclosporine without reoccurrence.  He is admitted to LTx service secondary to fever, abdominal pain, and N/V. He reports fever (103) for 1 day prior. Overnight, he developed N/V, reports small amount of green emesis with new left sided pain. He also endorses worsening SOB with exertion. He was scheduled as an outpatient for follow up paracentesis and IR drainage of fluid collection. CXR in ER shows large pleural effusion with subsegmental atelectasis. His ANC is 900. Broad spectrum antibiotics initiated in ED. Nephrology consulted for LAURA dialysis dependant co management. He dialyses at [facility] under the care of Dr. Peterson via IMAN rowe.      Interval History:   Patient evaluated at bedside during NINO, no significant event overnight. No sign of hypotension during dialysis.     Review of patient's allergies indicates:   Allergen Reactions    Bactrim [sulfamethoxazole-trimethoprim] Other (See Comments)     Mookie Trell Syndrome     Current Facility-Administered Medications    Medication Frequency    0.9%  NaCl infusion (for blood administration) Q24H PRN    0.9%  NaCl infusion PRN    0.9%  NaCl infusion PRN    0.9%  NaCl infusion Once    acetaminophen tablet 650 mg Q6H PRN    albuterol-ipratropium 2.5mg-0.5mg/3mL nebulizer solution 3 mL Q4H PRN    atovaquone suspension 1,500 mg Daily    bisacodyl suppository 10 mg Daily PRN    cycloSPORINE (NEORAL) 100 mg/mL microemulsion solution 200 mg BID    dextrose 50% injection 12.5 g PRN    dextrose 50% injection 25 g PRN    docusate sodium capsule 100 mg BID    epoetin maurisio injection 10,000 Units Every Mon, Wed, Fri    glucagon (human recombinant) injection 1 mg PRN    glucose chewable tablet 16 g PRN    glucose chewable tablet 24 g PRN    heparin (porcine) injection 1,000 Units PRN    heparin (porcine) injection 5,000 Units Q8H    isavuconazonium sulfate 372 mg in sodium chloride 0.9% 250 mL Q24H    levetiracetam oral soln Soln 500 mg BID    levothyroxine tablet 75 mcg Before breakfast    metoclopramide HCl 5 mg/5 mL solution 5 mg QID (AC & HS)    multivitamin tablet 1 tablet Daily    omeprazole capsule 40 mg QAM    omnipaque oral solution 15 mL PRN    ondansetron disintegrating tablet 8 mg Q6H PRN    ondansetron injection 4 mg Q6H PRN    oxyCODONE immediate release tablet 10 mg Q6H PRN    piperacillin-tazobactam 4.5 g in sodium chloride 0.9% 100 mL IVPB (ready to mix system) Q12H    polyethylene glycol packet 17 g BID    prochlorperazine injection Soln 10 mg Q6H PRN    psyllium husk (aspartame) 3.4 gram PwPk 1 packet BID    simethicone chewable tablet 80 mg TID PRN    sodium chloride 0.9% flush 3 mL PRN    sodium chloride 0.9% flush 3 mL PRN    ursodiol oral suspension (conc: 60 mg/mL) 300 mg TID    valganciclovir 50 mg/ml oral solution 100 mg Every Mon, Wed, Fri       Objective:     Vital Signs (Most Recent):  Temp: 99 °F (37.2 °C) (02/19/18 0730)  Pulse: 84 (02/19/18 0730)  Resp: 16 (02/19/18 0730)  BP:  122/80 (02/19/18 0730)  SpO2: 95 % (02/19/18 0730)  O2 Device (Oxygen Therapy): room air (02/19/18 0730) Vital Signs (24h Range):  Temp:  [98.4 °F (36.9 °C)-99.3 °F (37.4 °C)] 99 °F (37.2 °C)  Pulse:  [81-95] 84  Resp:  [16-20] 16  SpO2:  [95 %-98 %] 95 %  BP: (103-130)/(60-80) 122/80     Weight: 62.3 kg (137 lb 5.6 oz) (02/19/18 0600)  Body mass index is 22.17 kg/m².  Body surface area is 1.7 meters squared.    I/O last 3 completed shifts:  In: 1370 [P.O.:630; NG/GT:440; IV Piggyback:300]  Out: -     Physical Exam   Constitutional: He is oriented to person, place, and time. He appears well-developed. No distress.   HENT:   Head: Normocephalic and atraumatic.   Eyes: Conjunctivae are normal. Pupils are equal, round, and reactive to light.   Neck: Trachea normal. Neck supple. No JVD present.   Cardiovascular: Normal rate, regular rhythm, S1 normal, S2 normal, normal heart sounds, intact distal pulses and normal pulses.  Exam reveals no gallop and no friction rub.    No murmur heard.  Pulmonary/Chest: Effort normal and breath sounds normal.   Abdominal: Soft. Bowel sounds are normal. He exhibits distension, fluid wave and ascites.   Musculoskeletal: Normal range of motion. He exhibits edema (1+ pitting edema).   Neurological: He is alert and oriented to person, place, and time.   Skin: Skin is warm and dry. Capillary refill takes less than 2 seconds.   Psychiatric: He has a normal mood and affect. His behavior is normal.   Vitals reviewed.      Significant Labs:  ABGs: No results for input(s): PH, PCO2, HCO3, POCSATURATED, BE in the last 168 hours.  BMP:   Recent Labs  Lab 02/19/18  0528   GLU 58*      CO2 21*   BUN 26*   CREATININE 5.7*   CALCIUM 9.4   MG 1.7     CBC:   Recent Labs  Lab 02/19/18  0528   WBC 3.76*   RBC 2.46*   HGB 7.8*   HCT 23.8*   *   MCV 97   MCH 31.7*   MCHC 32.8     CMP:   Recent Labs  Lab 02/19/18  0528   GLU 58*   CALCIUM 9.4   ALBUMIN 1.7*   PROT 4.9*      K 4.3   CO2 21*       BUN 26*   CREATININE 5.7*   ALKPHOS 93   ALT <5*   AST 19   BILITOT 2.1*     All labs within the past 24 hours have been reviewed.     Assessment/Plan:     Acute renal failure with tubular necrosis    LAURA dialysisi dependant since 1016/2017  [place] [facility] [place] the cre of Dr. Peterson  On HD for: 2.5 mo since 10/16/2017  Duration of outpatient dialysis session - 3.5 hrs  EDW - TBD  Residual Renal Function - yes     Plan:  - Will provide dialysis for metabolic clearance and volume management in am via RIJ-PermCath, duration 4 hrs, UF 1-2 L as tolerated by patient, maintain MAP>65, will adjust bath to chem.   - Blood pressures stable          Win Sandoval  Nephrology  Fellow  [facility]    Pager [phone]  I have reviewed and concur with the fellow's history, physical, assessment, and plan. I have personally interviewed and examined the patient at bedside.

## 2018-02-19 NOTE — ASSESSMENT & PLAN NOTE
- Poor PO intake since transplant requiring short course of TPN during previous hospital stay.   - albumin remains decreased but appetite slowly improving per pt.   - Dietary consulted. Will need to closely monitor PO intake.   - supplements also ordered.   - SHANE with tube feeding start 1/16. Stopped 1/17/18, was not tolerating. Shane removed 1/20.  - prealbumin 7 on 1/15.  - TPN started 1/16/18.   - Pt able to eat minimally 1/21 and 1/22 which is an improvement.  - gi consulted - EGD reviewed, increased ppi bid, miralax bid, and metamucil bid, decreased narcotics.  - continue reglan, placed SHANE tube with tube feeds - transitioned to nightly feeds. Not tolerating well due to nausea.   - small bowel follow through with normal findings.   - consulted gi for possible gj tube placement, holding off on this for now as with ongoing fevers.  - Marinol started with some improvement in appetite seen but now on hold as was not eating likely due to feeling worse with ongoing fevers.  -Dietary to change tube feeds back to 24 hours from nightly today as not eating enough during the day to meet caloric needs.

## 2018-02-19 NOTE — ASSESSMENT & PLAN NOTE
- tbili with increase prompting ERCP 1/30 with stones and sludge, stents placed.  - T bili remains elevated. Along with ongoing fevers, abdominal pain. PBS consulted for possible ERCP. Plan for ERCP today. Monitor.

## 2018-02-19 NOTE — TELEPHONE ENCOUNTER
----- Message from Yara Law sent at 2/19/2018 12:14 PM CST -----  Contact: Pt  Isabel Gilliam Pt left a letter for you at the  in the transplant clinic.  Please call patient with any questions at [phone].

## 2018-02-19 NOTE — PROGRESS NOTES
New TF recommendations:     Run Peptamen 1.5 with Prebio continuous x 24hrs at goal rate of 60mL/hr. Initiate at 20mL/hr and increase by 10mL q 4hrs until goal of 60 is reached. Flushes per MD. Hold for residuals > 500 mL.     Discussed with RIMA Godwin, new ordered placed.     Thank you.     Michelle Arroyo, RD  #[phone]

## 2018-02-19 NOTE — ANESTHESIA PREPROCEDURE EVALUATION
02/19/2018  Jhonny Diana is a 27 y.o., male.    Anesthesia Evaluation    I have reviewed the Patient Summary Reports.     I have reviewed the Medications.     Review of Systems  Anesthesia Hx:  No problems with previous Anesthesia  History of prior surgery of interest to airway management or planning: liver transplant. Previous anesthesia: General   Social:  Former Smoker, No Alcohol Use    Hematology/Oncology:  Hematology Normal   Oncology Normal     EENT/Dental:EENT/Dental Normal   Cardiovascular:   Exercise tolerance: good Hypertension, well controlled    Pulmonary:  Pulmonary Normal    Hepatic/GI:   Liver Disease, Hepatitis    Musculoskeletal:  Musculoskeletal Normal    Neurological:   Seizures, well controlled    Endocrine:   Hypothyroidism    Dermatological:  Skin Normal    Psych:   Psychiatric History          Physical Exam  General:  Well nourished    Airway/Jaw/Neck:  Airway Findings: Mouth Opening: Normal Tongue: Normal  General Airway Assessment: Adult  Mallampati: II  TM Distance: Normal, at least 6 cm  Jaw/Neck Findings:  Neck ROM: Normal ROM      Dental:  Dental Findings: In tact        Mental Status:  Mental Status Findings:  Cooperative         Anesthesia Plan  Type of Anesthesia, risks & benefits discussed:  Anesthesia Type:  general  Patient's Preference: GA  Intra-op Monitoring Plan: standard ASA monitors  Intra-op Monitoring Plan Comments:   Post Op Pain Control Plan:   Post Op Pain Control Plan Comments:   Induction:   IV  Beta Blocker:  Patient is not currently on a Beta-Blocker (No further documentation required).       Informed Consent: Patient understands risks and agrees with Anesthesia plan.  Questions answered. Anesthesia consent signed with patient.  ASA Score: 3     Day of Surgery Review of History & Physical:  There are no significant changes.  H&P update referred to the  provider.         Ready For Surgery From Anesthesia Perspective.

## 2018-02-19 NOTE — PROGRESS NOTES
HD tx completed. 4 hours. 1 liter removed. Right CVC locked with heparin, capped and secured. Tolerated tx well. Pt going to Endoscopy. Report called to Anyi SHEEHAN.

## 2018-02-19 NOTE — PLAN OF CARE
Problem: Patient Care Overview  Goal: Plan of Care Review  Outcome: Ongoing (interventions implemented as appropriate)  Pt off unit to dialysis and for ERCP for > 8 hours of shift.

## 2018-02-19 NOTE — SUBJECTIVE & OBJECTIVE
Scheduled Meds:   atovaquone  1,500 mg Oral Daily    cycloSPORINE  175 mg Oral BID    docusate sodium  100 mg Oral BID    epoetin maurisio (PROCRIT) injection  10,000 Units Intravenous Every Mon, Wed, Fri    heparin (porcine)  5,000 Units Subcutaneous Q8H    custom IVPB builder   Intravenous Q24H    levetiracetam oral soln  500 mg Oral BID    levothyroxine  75 mcg Oral Before breakfast    metoclopramide HCl  5 mg Oral QID (AC & HS)    multivitamin  1 tablet Oral Daily    omeprazole  40 mg Oral QAM    piperacillin-tazobactam (ZOSYN) IVPB  4.5 g Intravenous Q12H    polyethylene glycol  17 g Oral BID    psyllium husk (aspartame)  3.4 g Oral BID    ursodiol  300 mg Oral TID    valganciclovir 50 mg/ml  100 mg Oral Every Mon, Wed, Fri    vancomycin (VANCOCIN) IVPB  500 mg Intravenous Once     Continuous Infusions:  PRN Meds:sodium chloride, sodium chloride 0.9%, sodium chloride 0.9%, acetaminophen, albuterol-ipratropium 2.5mg-0.5mg/3mL, bisacodyl, dextrose 50%, dextrose 50%, glucagon (human recombinant), glucose, glucose, heparin (porcine), omnipaque, ondansetron, ondansetron, oxyCODONE, prochlorperazine, simethicone, sodium chloride 0.9%, sodium chloride 0.9%, sodium chloride 0.9%    Review of Systems   Constitutional: Positive for activity change, appetite change and fatigue. Negative for chills.   HENT: Negative.  Negative for congestion and facial swelling.    Eyes: Negative for pain, discharge and visual disturbance.   Respiratory: Negative for cough, chest tightness, shortness of breath and wheezing.    Cardiovascular: Negative for chest pain, palpitations and leg swelling.   Gastrointestinal: Positive for abdominal distention, abdominal pain and nausea. Negative for constipation and vomiting.   Endocrine: Negative.    Genitourinary: Positive for decreased urine volume. Negative for difficulty urinating.        Anuric   Musculoskeletal: Negative for arthralgias, back pain and myalgias.   Skin: Positive  for color change and wound. Negative for rash.   Allergic/Immunologic: Positive for immunocompromised state.   Neurological: Negative for dizziness, seizures, weakness and headaches.   Psychiatric/Behavioral: Positive for decreased concentration and dysphoric mood. Negative for confusion. The patient is not nervous/anxious.    All other systems reviewed and are negative.    Objective:     Vital Signs (Most Recent):  Temp: 98.2 °F (36.8 °C) (02/19/18 1403)  Pulse: 90 (02/19/18 1403)  Resp: 17 (02/19/18 1403)  BP: 124/69 (02/19/18 1403)  SpO2: 99 % (02/19/18 1403) Vital Signs (24h Range):  Temp:  [98.2 °F (36.8 °C)-99.3 °F (37.4 °C)] 98.2 °F (36.8 °C)  Pulse:  [72-95] 90  Resp:  [16-20] 17  SpO2:  [95 %-99 %] 99 %  BP: (103-140)/(57-82) 124/69     Weight: 62.3 kg (137 lb 5.6 oz)  Body mass index is 22.17 kg/m².    Intake/Output - Last 3 Shifts       02/17 0700 - 02/18 0659 02/18 0700 - 02/19 0659 02/19 0700 - 02/20 0659    P.O. 480 270     Other   600    NG/ 220     IV Piggyback 450 200     Total Intake(mL/kg) 1150 (18.8) 690 (11.1) 600 (9.6)    Urine (mL/kg/hr)       Emesis/NG output       Other   1600 (3.1)    Stool       Blood       Total Output     1600    Net +1150 +690 -1000           Urine Occurrence 0 x 0 x     Stool Occurrence 2 x 1 x           Physical Exam   Constitutional: He is oriented to person, place, and time. He appears well-developed. No distress.   Temporal and distal extremity muscle wasting   HENT:   Head: Normocephalic and atraumatic.   Mouth/Throat: No oropharyngeal exudate.   dylan tube   Eyes: EOM are normal. Pupils are equal, round, and reactive to light. Scleral icterus is present.   Neck: Normal range of motion. Neck supple. No JVD present. No thyromegaly present.   Cardiovascular: Normal rate, regular rhythm, normal heart sounds and intact distal pulses.    No murmur heard.  Pulmonary/Chest: Effort normal. No respiratory distress. He has decreased breath sounds in the right middle  field, the right lower field and the left lower field. He has no wheezes. He exhibits no tenderness.   Diminished to RLL   Abdominal: Soft. Bowel sounds are normal. He exhibits ascites. He exhibits no distension. There is tenderness. There is no rebound and no guarding.   Dressing to chevron.  Wd vac removed 1/12/18  Dependent edema present R flank; surrounding erythema   Musculoskeletal: Normal range of motion. He exhibits edema (2+ LE edema). He exhibits no tenderness.   Neurological: He is alert and oriented to person, place, and time. He has normal reflexes.   Skin: Skin is warm and dry. Capillary refill takes 2 to 3 seconds. He is not diaphoretic. No erythema.   jaundice   Psychiatric: He has a normal mood and affect. His behavior is normal. Judgment and thought content normal. His mood appears not anxious.   Depressed mood   Nursing note and vitals reviewed.      Laboratory:  Immunosuppressants         Stop Route Frequency     cycloSPORINE (NEORAL) 100 mg/mL microemulsion solution 175 mg      -- Oral 2 times daily        CBC:     Recent Labs  Lab 02/19/18  0528   WBC 3.76*   RBC 2.46*   HGB 7.8*   HCT 23.8*   *   MCV 97   MCH 31.7*   MCHC 32.8     CMP:     Recent Labs  Lab 02/19/18  0528   GLU 58*   CALCIUM 9.4   ALBUMIN 1.7*   PROT 4.9*      K 4.3   CO2 21*      BUN 26*   CREATININE 5.7*   ALKPHOS 93   ALT <5*   AST 19   BILITOT 2.1*     Coagulation:     Recent Labs  Lab 02/16/18  0436   INR 1.0     Labs within the past 24 hours have been reviewed.    Diagnostic Results:  None

## 2018-02-19 NOTE — ASSESSMENT & PLAN NOTE
LAURA dialysisi dependant since 1016/2017  HD on TTS [facility] under the cre of Dr. Peterson  On HD for: 2.5 mo since 10/16/2017  Duration of outpatient dialysis session - 3.5 hrs  EDW - TBD  Residual Renal Function - yes     Plan:  - Will provide dialysis for metabolic clearance and volume management in am via RIJ-PermCath, duration 4 hrs, UF 1-2 L as tolerated by patient, maintain MAP>65, will adjust bath to chem.   - Blood pressures stable

## 2018-02-19 NOTE — PROGRESS NOTES
Patient arrived on unit via wheelchair. Standing weight obtained @ 66.8 kg. Dialysis tx initiated via right CVC. Ports aspirated and flushed without difficulty. Lines connected and secured. Good blood flows established.

## 2018-02-19 NOTE — ASSESSMENT & PLAN NOTE
- h/o constipation, reports having regular BM's with bowel regimen.  - vomiting episode x 1 at home. Anti-emetics ordered PRN.  - KUB repeated 1/14/18 given increased abdominal pain- mild, generalized bowel distention suggesting ileus.  Diet changed to clears as tolerated for bowel rest .  - SHANE tube placed 1/16 for tube feeds. Not tolerating tube feeds, stopped 1/17/18.  - D/C shane 1/19, now in place as of 1/25.  - chronic constipation, Cont bowel regimen.   - GI consulted. See protein calorie malnutrition.  - not tolerating TFs due to nausea. Formula changed.

## 2018-02-19 NOTE — SUBJECTIVE & OBJECTIVE
Interval History:   Patient evaluated at bedside during NINO, no significant event overnight. No sign of hypotension during dialysis.     Review of patient's allergies indicates:   Allergen Reactions    Bactrim [sulfamethoxazole-trimethoprim] Other (See Comments)     Mookie Trell Syndrome     Current Facility-Administered Medications   Medication Frequency    0.9%  NaCl infusion (for blood administration) Q24H PRN    0.9%  NaCl infusion PRN    0.9%  NaCl infusion PRN    0.9%  NaCl infusion Once    acetaminophen tablet 650 mg Q6H PRN    albuterol-ipratropium 2.5mg-0.5mg/3mL nebulizer solution 3 mL Q4H PRN    atovaquone suspension 1,500 mg Daily    bisacodyl suppository 10 mg Daily PRN    cycloSPORINE (NEORAL) 100 mg/mL microemulsion solution 200 mg BID    dextrose 50% injection 12.5 g PRN    dextrose 50% injection 25 g PRN    docusate sodium capsule 100 mg BID    epoetin maurisio injection 10,000 Units Every Mon, Wed, Fri    glucagon (human recombinant) injection 1 mg PRN    glucose chewable tablet 16 g PRN    glucose chewable tablet 24 g PRN    heparin (porcine) injection 1,000 Units PRN    heparin (porcine) injection 5,000 Units Q8H    isavuconazonium sulfate 372 mg in sodium chloride 0.9% 250 mL Q24H    levetiracetam oral soln Soln 500 mg BID    levothyroxine tablet 75 mcg Before breakfast    metoclopramide HCl 5 mg/5 mL solution 5 mg QID (AC & HS)    multivitamin tablet 1 tablet Daily    omeprazole capsule 40 mg QAM    omnipaque oral solution 15 mL PRN    ondansetron disintegrating tablet 8 mg Q6H PRN    ondansetron injection 4 mg Q6H PRN    oxyCODONE immediate release tablet 10 mg Q6H PRN    piperacillin-tazobactam 4.5 g in sodium chloride 0.9% 100 mL IVPB (ready to mix system) Q12H    polyethylene glycol packet 17 g BID    prochlorperazine injection Soln 10 mg Q6H PRN    psyllium husk (aspartame) 3.4 gram PwPk 1 packet BID    simethicone chewable tablet 80 mg TID PRN    sodium  chloride 0.9% flush 3 mL PRN    sodium chloride 0.9% flush 3 mL PRN    ursodiol oral suspension (conc: 60 mg/mL) 300 mg TID    valganciclovir 50 mg/ml oral solution 100 mg Every Mon, Wed, Fri       Objective:     Vital Signs (Most Recent):  Temp: 99 °F (37.2 °C) (02/19/18 0730)  Pulse: 84 (02/19/18 0730)  Resp: 16 (02/19/18 0730)  BP: 122/80 (02/19/18 0730)  SpO2: 95 % (02/19/18 0730)  O2 Device (Oxygen Therapy): room air (02/19/18 0730) Vital Signs (24h Range):  Temp:  [98.4 °F (36.9 °C)-99.3 °F (37.4 °C)] 99 °F (37.2 °C)  Pulse:  [81-95] 84  Resp:  [16-20] 16  SpO2:  [95 %-98 %] 95 %  BP: (103-130)/(60-80) 122/80     Weight: 62.3 kg (137 lb 5.6 oz) (02/19/18 0600)  Body mass index is 22.17 kg/m².  Body surface area is 1.7 meters squared.    I/O last 3 completed shifts:  In: 1370 [P.O.:630; NG/GT:440; IV Piggyback:300]  Out: -     Physical Exam   Constitutional: He is oriented to person, place, and time. He appears well-developed. No distress.   HENT:   Head: Normocephalic and atraumatic.   Eyes: Conjunctivae are normal. Pupils are equal, round, and reactive to light.   Neck: Trachea normal. Neck supple. No JVD present.   Cardiovascular: Normal rate, regular rhythm, S1 normal, S2 normal, normal heart sounds, intact distal pulses and normal pulses.  Exam reveals no gallop and no friction rub.    No murmur heard.  Pulmonary/Chest: Effort normal and breath sounds normal.   Abdominal: Soft. Bowel sounds are normal. He exhibits distension, fluid wave and ascites.   Musculoskeletal: Normal range of motion. He exhibits edema (1+ pitting edema).   Neurological: He is alert and oriented to person, place, and time.   Skin: Skin is warm and dry. Capillary refill takes less than 2 seconds.   Psychiatric: He has a normal mood and affect. His behavior is normal.   Vitals reviewed.      Significant Labs:  ABGs: No results for input(s): PH, PCO2, HCO3, POCSATURATED, BE in the last 168 hours.  BMP:   Recent Labs  Lab  02/19/18  0528   GLU 58*      CO2 21*   BUN 26*   CREATININE 5.7*   CALCIUM 9.4   MG 1.7     CBC:   Recent Labs  Lab 02/19/18 0528   WBC 3.76*   RBC 2.46*   HGB 7.8*   HCT 23.8*   *   MCV 97   MCH 31.7*   MCHC 32.8     CMP:   Recent Labs  Lab 02/19/18 0528   GLU 58*   CALCIUM 9.4   ALBUMIN 1.7*   PROT 4.9*      K 4.3   CO2 21*      BUN 26*   CREATININE 5.7*   ALKPHOS 93   ALT <5*   AST 19   BILITOT 2.1*     All labs within the past 24 hours have been reviewed.

## 2018-02-19 NOTE — ASSESSMENT & PLAN NOTE
- Started on vanc/cefepime for ERCP. Now transitioned to vanc/zosyn.  - Blood cultures sent, prelim NGTD. Repeat blood cultures and chest xray today 2/10.  - thoracentesis 2/1 - fluid negative for infection.  - atelectasis noted on ct scan - start breathing tx & CPT.  - now cmv positive - started treatment 2/5/18.  - taken to OR 2/7 for exp lap - fluid collections drained, cultures pending.  - Patient with low grade fever 2/15, 100.3.  -CT A/P 2/15 with no evidence of IA fluid infection. Did show R pleural effusion s/p thoracentesis, cell count negative for infection, cx pending.  -Repeat blood cx 2/15 prelim NGTD, CMV PCR 2/15 negative   Complaining of abdominal pain. Is tender in RUQ.  -Remains with fevers  - Bilirubin remains elevated. Concerning for cholangitis. Antibiotics broadened to Zosyn, Vanc 2/17. ID re consulted.   -PBS consulted for possible ERCP.  - Given continued fevers despite negative infectious work up. Plan for ERCP today. Cont broad spectrum antibiotics.

## 2018-02-19 NOTE — H&P
Pre-Procedure H and P Addendum    Patient seen and examined.  History and exam unchanged from prior history and physical.      Procedure: ERCP  Indication: Fever. S/P liver trasnplant  ASA Class:III  Airway: normal  Neck Mobility: full range of motion  Mallampatti score: per anesthesia    Anesthesia Plan: MAC    The impression and plan was discussed in detail with the patient. All questions have been answered and the patient voices understanding of our plan at this point. The risk of the procedure was discussed in detail which includes but not limited to bleeding, infection, perforation in some cases requiring surgery with its spectrum of complications.

## 2018-02-19 NOTE — SUBJECTIVE & OBJECTIVE
Interval History: Fever curve showing improvement on broad spectrum coverage. Cultures remain negative to date.    Review of Systems   Constitutional: Positive for activity change, appetite change and fatigue. Negative for chills and fever.   HENT: Negative.  Negative for congestion and facial swelling.    Eyes: Negative for pain, discharge and visual disturbance.   Respiratory: Negative for cough, chest tightness, shortness of breath and wheezing.    Cardiovascular: Negative for chest pain, palpitations and leg swelling.   Gastrointestinal: Positive for abdominal distention, abdominal pain and nausea. Negative for constipation and vomiting.   Endocrine: Negative.    Genitourinary:        Anuric   Musculoskeletal: Negative for arthralgias, back pain and myalgias.   Skin: Positive for color change and wound. Negative for rash.   Allergic/Immunologic: Positive for immunocompromised state.   Neurological: Negative for dizziness, seizures, weakness and headaches.   Psychiatric/Behavioral: Positive for decreased concentration and dysphoric mood. Negative for confusion. The patient is not nervous/anxious.    All other systems reviewed and are negative.      Objective:     Vital Signs (Most Recent):  Temp: 99.2 °F (37.3 °C) (02/17/18 1542)  Pulse: 90 (02/17/18 1542)  Resp: 16 (02/17/18 1542)  BP: 135/85 (02/17/18 1542)  SpO2: 95 % (02/17/18 1542) Vital Signs (24h Range):  Temp:  [98.6 °F (37 °C)-101.2 °F (38.4 °C)] 99.2 °F (37.3 °C)  Pulse:  [84-92] 90  Resp:  [16-20] 16  SpO2:  [92 %-96 %] 95 %  BP: (128-137)/(75-85) 135/85     Weight: 60.2 kg (132 lb 11.5 oz)  Body mass index is 21.42 kg/m².    Estimated Creatinine Clearance: 26.8 mL/min (A) (based on SCr of 3.5 mg/dL (H)).    Physical Exam   Constitutional: He is oriented to person, place, and time. He appears well-developed. No distress.   Temporal and distal extremity muscle wasting   HENT:   Head: Normocephalic and atraumatic.   Mouth/Throat: No oropharyngeal  exudate.   dylan tube   Eyes: EOM are normal. Pupils are equal, round, and reactive to light. No scleral icterus.   Neck: Normal range of motion. Neck supple. No JVD present. No thyromegaly present.   Cardiovascular: Normal rate, regular rhythm, normal heart sounds and intact distal pulses.    No murmur heard.  Pulmonary/Chest: Effort normal. No respiratory distress. He has no wheezes. He exhibits no tenderness.   Diminished to RLL   Abdominal: Soft. Bowel sounds are normal. He exhibits ascites. He exhibits no distension. There is tenderness. There is no rebound and no guarding.   Dressing to chevron.  Wd vac removed 1/12/18  Dependent edema present R flank; surrounding erythema   Musculoskeletal: Normal range of motion. He exhibits no tenderness. Edema: 2+ LE edema.   Neurological: He is alert and oriented to person, place, and time. He has normal reflexes.   Skin: Skin is warm and dry. He is not diaphoretic. No erythema.   Psychiatric: His mood appears not anxious.   Nursing note and vitals reviewed.      Significant Labs:   CBC:     Recent Labs  Lab 02/16/18  0436 02/17/18  0343   WBC 2.85* 2.23*   HGB 8.8* 7.8*   HCT 26.8* 23.9*    134*     CMP:     Recent Labs  Lab 02/16/18  0436 02/17/18  0343   * 137   K 4.0 3.8    105   CO2 23 23   GLU 84 81   BUN 23* 13   CREATININE 5.1* 3.5*   CALCIUM 9.5 8.8   PROT 5.6* 5.1*   ALBUMIN 2.0* 1.8*   BILITOT 2.2* 2.1*   ALKPHOS 125 111   AST 25 18   ALT <5* <5*   ANIONGAP 11 9   EGFRNONAA 14.2* 22.4*       Significant Imaging: I have reviewed all pertinent imaging results/findings within the past 24 hours.     CT CAP:   Post surgical changes prior hepatic transplant.  Percutaneous surgical drain with notable decrease in size of fluid collection in lower abdomen.  Small amount of fluid tracking elsewhere within the abdomen and pelvis.  Large volume right pleural fluid, increased in size since prior exam, with associated right lower lobe atelectasis.  Diffuse  body wall edema.  Small pericardial effusion.    Microbiology:  1/11 blood cx: negative  1/11 pleural fluid cx: negative  1/11 ascites cx: negative  1/17 blood cx: negative  1/18 blood cx: negative  1/19 pleural fluid cx: NGTD  1/19 ascites cx: NGTD  2/1 RVP negative  2/7 OR cx: NGTD  2/11 blood cx: negative  2/15 blood cx: negative  2/16 pleural fluid cx: negative

## 2018-02-19 NOTE — ASSESSMENT & PLAN NOTE
- CXR in ER with large right pleural effusion with subjective c/o worsening SOB  - thoracentesis completed- 1200 ml removed.  SOB with significant improvement.   - Cell count reviewed and negative for infection.   - resp even and non labored.  O2 sat 94-98%.  - Repeat thora 1/19 negative for infection per cell count.  - Reaccumulating fluid on xray 1/21.  - thoracentesis 1/31 - 1.4L removed, negative for infection  - Reaccumulating fluid again on xray 2/10.   -Thoracentesis performed  2/16 with 1500 cc off, cell count neg for infection, cx no growth to date.

## 2018-02-19 NOTE — TRANSFER OF CARE
"Anesthesia Transfer of Care Note    Patient: Jhonny Diana    Procedure(s) Performed: Procedure(s) (LRB):  ERCP (N/A)    Patient location: [facility]    Anesthesia Type: general    Transport from OR: Transported from OR on 6-10 L/min O2 by face mask with adequate spontaneous ventilation    Post pain: adequate analgesia    Post assessment: no apparent anesthetic complications and tolerated procedure well    Post vital signs: stable    Level of consciousness: awake, alert and lethargic    Nausea/Vomiting: no nausea/vomiting    Complications: none    Transfer of care protocol was followed      Last vitals:   Visit Vitals  /69 (BP Location: Left arm, Patient Position: Lying)   Pulse 90   Temp 36.8 °C (98.2 °F) (Temporal)   Resp 17   Ht 5' 6" (1.676 m)   Wt 62.3 kg (137 lb 5.6 oz)   SpO2 99%   BMI 22.17 kg/m²     "

## 2018-02-19 NOTE — PROGRESS NOTES
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 through 1/12, and several recent admissions on 11/24 and 12/3 with culture negative peritonitis with peak WBC ~5k due to suspected indolent biliary leakage s/p sphincterotomy/biliary stent placement over CBD stricture on 12/6 ERCP although notably with no overt leak noted during procedure) who was admitted on 1/11/2017 with acute onset fevers and acute on chronic N/V/abdominal pain (RLQ worst) in the setting of persistent noninflammatory ascites of unknown etiology. He underwent repeated drainage of pleural fluid and ascites that did not show evidence of gross infection and given that fevers were antibiotic responsive and walled off biloma was suspected, he underwent ex-lap on 2/8 where a walled off presumed partly bilious collection was noted in the RLQ and debrided (cultures negative). He was tapered to oral antibiotics after his procedure given negative cultures but has spiked a temperature again with a similar constellation of symptoms arguing for a similar cause. He remains tenuous.    - may continue empiric vanc/zosyn for now  - additional prophylaxis including isavuconazole per protocol  - as CT does not show additional obvious pathology, repeat cultures remain negative, and given prior history and persistently abnormal bilirubin, agree with repeat examination of his biliary system by ERCP -- appreciate assistance of PBS in this matter today               Anticipated Disposition: pending improvement    Thank you for your consult. I will follow-up with patient. Please contact us if you have any additional questions.     Melanie Bergman MD  Transplant ID Attending  [phone]    Melanie Bergman MD  Infectious Disease  [facility]    Subjective:     Principal Problem:Fever    HPI: No notes on file  Interval History: Fever curve showing improvement on broad spectrum coverage. Cultures remain negative to date.    Review of Systems   Constitutional: Positive for activity change, appetite change and fatigue. Negative for chills and fever.   HENT: Negative.  Negative for congestion and facial swelling.    Eyes: Negative for pain, discharge and visual disturbance.   Respiratory: Negative for cough, chest tightness, shortness of breath and wheezing.    Cardiovascular: Negative for chest pain, palpitations and leg swelling.   Gastrointestinal: Positive for abdominal distention, abdominal pain and nausea. Negative for constipation and vomiting.   Endocrine: Negative.    Genitourinary:        Anuric   Musculoskeletal: Negative for arthralgias, back pain and myalgias.   Skin: Positive for color change and wound. Negative for rash.   Allergic/Immunologic: Positive for immunocompromised state.   Neurological: Negative for dizziness, seizures, weakness and headaches.   Psychiatric/Behavioral: Positive for decreased concentration and dysphoric mood. Negative for confusion. The patient is not nervous/anxious.    All other systems reviewed and are negative.      Objective:     Vital Signs (Most Recent):  Temp: 99.2 °F (37.3 °C) (02/17/18 1542)  Pulse: 90 (02/17/18 1542)  Resp: 16 (02/17/18 1542)  BP: 135/85 (02/17/18 1542)  SpO2: 95 % (02/17/18 1542) Vital Signs (24h Range):  Temp:  [98.6 °F (37 °C)-101.2 °F (38.4 °C)] 99.2 °F (37.3 °C)  Pulse:  [84-92] 90  Resp:  [16-20] 16  SpO2:  [92 %-96 %] 95 %  BP: (128-137)/(75-85) 135/85     Weight: 60.2 kg (132 lb 11.5  oz)  Body mass index is 21.42 kg/m².    Estimated Creatinine Clearance: 26.8 mL/min (A) (based on SCr of 3.5 mg/dL (H)).    Physical Exam   Constitutional: He is oriented to person, place, and time. He appears well-developed. No distress.   Temporal and distal extremity muscle wasting   HENT:   Head: Normocephalic and atraumatic.   Mouth/Throat: No oropharyngeal exudate.   dylan tube   Eyes: EOM are normal. Pupils are equal, round, and reactive to light. No scleral icterus.   Neck: Normal range of motion. Neck supple. No JVD present. No thyromegaly present.   Cardiovascular: Normal rate, regular rhythm, normal heart sounds and intact distal pulses.    No murmur heard.  Pulmonary/Chest: Effort normal. No respiratory distress. He has no wheezes. He exhibits no tenderness.   Diminished to RLL   Abdominal: Soft. Bowel sounds are normal. He exhibits ascites. He exhibits no distension. There is tenderness. There is no rebound and no guarding.   Dressing to chevron.  Wd vac removed 1/12/18  Dependent edema present R flank; surrounding erythema   Musculoskeletal: Normal range of motion. He exhibits no tenderness. Edema: 2+ LE edema.   Neurological: He is alert and oriented to person, place, and time. He has normal reflexes.   Skin: Skin is warm and dry. He is not diaphoretic. No erythema.   Psychiatric: His mood appears not anxious.   Nursing note and vitals reviewed.      Significant Labs:   CBC:     Recent Labs  Lab 02/16/18  0436 02/17/18  0343   WBC 2.85* 2.23*   HGB 8.8* 7.8*   HCT 26.8* 23.9*    134*     CMP:     Recent Labs  Lab 02/16/18  0436 02/17/18  0343   * 137   K 4.0 3.8    105   CO2 23 23   GLU 84 81   BUN 23* 13   CREATININE 5.1* 3.5*   CALCIUM 9.5 8.8   PROT 5.6* 5.1*   ALBUMIN 2.0* 1.8*   BILITOT 2.2* 2.1*   ALKPHOS 125 111   AST 25 18   ALT <5* <5*   ANIONGAP 11 9   EGFRNONAA 14.2* 22.4*       Significant Imaging: I have reviewed all pertinent imaging results/findings within the past 24  hours.     CT CAP:   Post surgical changes prior hepatic transplant.  Percutaneous surgical drain with notable decrease in size of fluid collection in lower abdomen.  Small amount of fluid tracking elsewhere within the abdomen and pelvis.  Large volume right pleural fluid, increased in size since prior exam, with associated right lower lobe atelectasis.  Diffuse body wall edema.  Small pericardial effusion.    Microbiology:  1/11 blood cx: negative  1/11 pleural fluid cx: negative  1/11 ascites cx: negative  1/17 blood cx: negative  1/18 blood cx: negative  1/19 pleural fluid cx: NGTD  1/19 ascites cx: NGTD  2/1 RVP negative  2/7 OR cx: NGTD  2/11 blood cx: negative  2/15 blood cx: negative  2/16 pleural fluid cx: negative

## 2018-02-19 NOTE — NURSING TRANSFER
Nursing Transfer Note      2/19/2018     Transfer To: [address] From Phase 2 [address] [address]    Transfer via stretcher    Transfer with cardiac monitoring    Transported by Unit PCT    Medicines sent: Vancomycin (nearing completion)    Chart send with patient: Yes    Notified: Anyi SHEEHAN    Patient reassessed at: *1645* 2/19/2018    Upon arrival to floor: cardiac monitor applied, patient oriented to room, call bell in reach and bed in lowest position

## 2018-02-19 NOTE — PROVATION PATIENT INSTRUCTIONS
Discharge Summary/Instructions after an Endoscopic Procedure  Patient Name: Jhonny Diana  Patient MRN: [id number]  Patient YOB: 1990 Monday, February 19, 2018  Dionicio Hickman MD  RESTRICTIONS:  During your procedure today, you received medications for sedation.  These   medications may affect your judgment, balance and coordination.  Therefore,   for 24 hours, you have the following restrictions:   - DO NOT drive a car, operate machinery, make legal/financial decisions,   sign important papers or drink alcohol.    ACTIVITY:  The following day: return to full activity including work, except no heavy   lifting, straining or running for 3 days if polyps were removed.  DIET:  Eat and drink normally unless instructed otherwise.     TREATMENT FOR COMMON SIDE EFFECTS:  - Mild abdominal pain, belching, bloating or excessive gas: rest, eat   lightly and use a heating pad.  - Sore Throat: treat with throat lozenges and/or gargle with warm salt   water.  SYMPTOMS TO WATCH FOR AND REPORT TO YOUR PHYSICIAN:  1. Abdominal pain or bloating, other than gas cramps.  2. Chest pain.  3. Back pain.  4. Chills or fever occurring within 24 hours after the procedure.  5. Rectal bleeding, which would show as bright red, maroon, or black stools.   (A tablespoon of blood from the rectum is not serious, especially if   hemorrhoids are present.)  6. Vomiting.  7. Weakness or dizziness.  8. Because air was used during the procedure, expelling large amounts of air   from your rectum or belching is normal.  9. If a bowel prep was taken, you may not have a bowel movement for 1-3   days.  This is normal.  GO DIRECTLY TO THE NEAREST EMERGENCY ROOM IF YOU HAVE ANY OF THE FOLLOWING:      Difficulty breathing  Chills and/or fever over 101 F   Persistent vomiting and/or vomiting blood   Severe abdominal pain   Severe chest pain   Black, tarry stools   Bleeding- more than one tablespoon   Any other symptom or condition that you may feel needs  urgent attention  Your doctor recommends these additional instructions:  If any biopsies were taken, your doctor s clinic will contact you in 1 to 2   weeks with any results.  Watch for symptoms of pancreatitis, bleeding, perforation and cholangitis.   Your physician has recommended a repeat ERCP in 10 weeks to remove stent.  For questions, problems or results please call your physician - Dionicio Hickman MD at Work:  [phone].  [facility], EMERGENCY ROOM PHONE NUMBER: [phone]  IF A COMPLICATION OR EMERGENCY SITUATION ARISES AND YOU ARE UNABLE TO REACH   YOUR PHYSICIAN - GO DIRECTLY TO THE EMERGENCY ROOM.  Dionicio Hickman MD  2/19/2018 3:28:37 PM  This report has been verified and signed electronically.

## 2018-02-19 NOTE — PROGRESS NOTES
[facility]  Liver Transplant  Progress Note    Patient Name: Jhonny Diana  MRN: [id number]  Admission Date: 2018  Hospital Length of Stay: 39 days  Code Status: Full Code  Primary Care Provider: Primary Doctor No  Post-Operative Day: 123    ORGAN:   LIVER  Disease Etiology: Acute Alcoholic Hepatitis  Donor Type:    - Brain Death  CDC High Risk:   No  Donor CMV Status:   Donor CMV Status: Positive  Donor HBcAB:   Negative  Donor HCV Status:   Negative  Whole or Partial: Whole Liver  Biliary Anastomosis: End to End  Arterial Anatomy: Standard  Subjective:     History of Present Illness:  Jhonny Diana is a 26 y/o male with past medical history of alcoholic cirrhosis.  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 Found to have peritonitis in November (WBC 5000, 75% PNM) neg for bile leak. He was treated initially with vanc/cefepime. Repeat cell counts  with some improvement (WBC 1400, 45% PNM). He has undergone multiple paracenteses as well as abscess drainage of perihepatic fluid collections and treated with antimicrobial therapy but no positive cultures. Of note, biliary stricture also identified and ERCP performed on 2017 with sphincterotomy and biliary stent placed. Liver tests still have not normalized despite intervention, bilirubin and AP remain elevated. Other pertinent PMH current wound vac in place 2/2 wound infection, malnutrition requiring TPN for short course and ongoing hypoalbuminemia, and seizure activity while on prograf and has since been switched to cyclosporine without reoccurrence.  He presented to the ER for fever, abdominal pain, and N/V. He reports fever (103) for 1  day prior. Overnight, he developed N/V, reports small amount of green emesis with new left sided pain. He also endorses worsening SOB with exertion. He was scheduled as an outpatient for follow up paracentesis and IR drainage of fluid collection. CXR in ER shows large pleural effusion with subsegmental atelectasis. Infectious work up initiated in ER. His ANC is 900. Broad spectrum antibiotics initiated in ED. He denies chest pain, palpitations, diarrhea, constipation, or back pain. Denies any sick contacts.    Hospital Course:  Thoracentesis (1.2L off), Paracentesis (1.6L off), and IR drainage of fluid collection 1/11, all fluids negative for infection. ID consulted. Broad spectrum antibiotics d/c'd 1/15. Chronically malnourished with poor PO intake, prealbumin 7. Shane tube placed 1/15 for tube feedings.     ERCP 1/17 with sludge and a biliary stone which was removed and stent exchanged.  Remained with n/v; therefore, TF remained on hold and TPN continued.   Pt with fever s/p ERCP on 1/17 which continued until 1/19.  Vanc/cefepime restarted.  Blood cx 1/17 and 1/18 NGTD.  ID reconsulted.  Fungal markers sent.  CT C/A/P obtained.  With large R pleural effusion and ascites- both drained 1/19 and negative for infx per cell count. Pt afebrile 1/20. Liver biopsy 1/23 - without rejection.  EGD 1/25 - unremarkable for source of GI symptoms.  Resume diet along with other GI recommendations. SHANE tube placed for tube feeds.       Interval History: No acute events overnight. Patient receiving HD this AM. Plan for ERCP today via PBS. Currently afebrile. Will continue broad spectrum antibiotics for now. Plan to change tube feeds to 24 hours from nightly as patient po intake decreased. Cont to monitor closely.     Scheduled Meds:   atovaquone  1,500 mg Oral Daily    cycloSPORINE  175 mg Oral BID    docusate sodium  100 mg Oral BID    epoetin maurisio (PROCRIT) injection  10,000 Units Intravenous Every Mon, Wed, Fri    heparin  (porcine)  5,000 Units Subcutaneous Q8H    custom IVPB builder   Intravenous Q24H    levetiracetam oral soln  500 mg Oral BID    levothyroxine  75 mcg Oral Before breakfast    metoclopramide HCl  5 mg Oral QID (AC & HS)    multivitamin  1 tablet Oral Daily    omeprazole  40 mg Oral QAM    piperacillin-tazobactam (ZOSYN) IVPB  4.5 g Intravenous Q12H    polyethylene glycol  17 g Oral BID    psyllium husk (aspartame)  3.4 g Oral BID    ursodiol  300 mg Oral TID    valganciclovir 50 mg/ml  100 mg Oral Every Mon, Wed, Fri    vancomycin (VANCOCIN) IVPB  500 mg Intravenous Once     Continuous Infusions:  PRN Meds:sodium chloride, sodium chloride 0.9%, sodium chloride 0.9%, acetaminophen, albuterol-ipratropium 2.5mg-0.5mg/3mL, bisacodyl, dextrose 50%, dextrose 50%, glucagon (human recombinant), glucose, glucose, heparin (porcine), omnipaque, ondansetron, ondansetron, oxyCODONE, prochlorperazine, simethicone, sodium chloride 0.9%, sodium chloride 0.9%, sodium chloride 0.9%    Review of Systems   Constitutional: Positive for activity change, appetite change and fatigue. Negative for chills.   HENT: Negative.  Negative for congestion and facial swelling.    Eyes: Negative for pain, discharge and visual disturbance.   Respiratory: Negative for cough, chest tightness, shortness of breath and wheezing.    Cardiovascular: Negative for chest pain, palpitations and leg swelling.   Gastrointestinal: Positive for abdominal distention, abdominal pain and nausea. Negative for constipation and vomiting.   Endocrine: Negative.    Genitourinary: Positive for decreased urine volume. Negative for difficulty urinating.        Anuric   Musculoskeletal: Negative for arthralgias, back pain and myalgias.   Skin: Positive for color change and wound. Negative for rash.   Allergic/Immunologic: Positive for immunocompromised state.   Neurological: Negative for dizziness, seizures, weakness and headaches.   Psychiatric/Behavioral: Positive  for decreased concentration and dysphoric mood. Negative for confusion. The patient is not nervous/anxious.    All other systems reviewed and are negative.    Objective:     Vital Signs (Most Recent):  Temp: 98.2 °F (36.8 °C) (02/19/18 1403)  Pulse: 90 (02/19/18 1403)  Resp: 17 (02/19/18 1403)  BP: 124/69 (02/19/18 1403)  SpO2: 99 % (02/19/18 1403) Vital Signs (24h Range):  Temp:  [98.2 °F (36.8 °C)-99.3 °F (37.4 °C)] 98.2 °F (36.8 °C)  Pulse:  [72-95] 90  Resp:  [16-20] 17  SpO2:  [95 %-99 %] 99 %  BP: (103-140)/(57-82) 124/69     Weight: 62.3 kg (137 lb 5.6 oz)  Body mass index is 22.17 kg/m².    Intake/Output - Last 3 Shifts       02/17 0700 - 02/18 0659 02/18 0700 - 02/19 0659 02/19 0700 - 02/20 0659    P.O. 480 270     Other   600    NG/ 220     IV Piggyback 450 200     Total Intake(mL/kg) 1150 (18.8) 690 (11.1) 600 (9.6)    Urine (mL/kg/hr)       Emesis/NG output       Other   1600 (3.1)    Stool       Blood       Total Output     1600    Net +1150 +690 -1000           Urine Occurrence 0 x 0 x     Stool Occurrence 2 x 1 x           Physical Exam   Constitutional: He is oriented to person, place, and time. He appears well-developed. No distress.   Temporal and distal extremity muscle wasting   HENT:   Head: Normocephalic and atraumatic.   Mouth/Throat: No oropharyngeal exudate.   dylan tube   Eyes: EOM are normal. Pupils are equal, round, and reactive to light. Scleral icterus is present.   Neck: Normal range of motion. Neck supple. No JVD present. No thyromegaly present.   Cardiovascular: Normal rate, regular rhythm, normal heart sounds and intact distal pulses.    No murmur heard.  Pulmonary/Chest: Effort normal. No respiratory distress. He has decreased breath sounds in the right middle field, the right lower field and the left lower field. He has no wheezes. He exhibits no tenderness.   Diminished to RLL   Abdominal: Soft. Bowel sounds are normal. He exhibits ascites. He exhibits no distension. There is  tenderness. There is no rebound and no guarding.   Dressing to chevron.  Wd vac removed 1/12/18  Dependent edema present R flank; surrounding erythema   Musculoskeletal: Normal range of motion. He exhibits edema (2+ LE edema). He exhibits no tenderness.   Neurological: He is alert and oriented to person, place, and time. He has normal reflexes.   Skin: Skin is warm and dry. Capillary refill takes 2 to 3 seconds. He is not diaphoretic. No erythema.   jaundice   Psychiatric: He has a normal mood and affect. His behavior is normal. Judgment and thought content normal. His mood appears not anxious.   Depressed mood   Nursing note and vitals reviewed.      Laboratory:  Immunosuppressants         Stop Route Frequency     cycloSPORINE (NEORAL) 100 mg/mL microemulsion solution 175 mg      -- Oral 2 times daily        CBC:     Recent Labs  Lab 02/19/18  0528   WBC 3.76*   RBC 2.46*   HGB 7.8*   HCT 23.8*   *   MCV 97   MCH 31.7*   MCHC 32.8     CMP:     Recent Labs  Lab 02/19/18  0528   GLU 58*   CALCIUM 9.4   ALBUMIN 1.7*   PROT 4.9*      K 4.3   CO2 21*      BUN 26*   CREATININE 5.7*   ALKPHOS 93   ALT <5*   AST 19   BILITOT 2.1*     Coagulation:     Recent Labs  Lab 02/16/18  0436   INR 1.0     Labs within the past 24 hours have been reviewed.    Diagnostic Results:  None    Assessment/Plan:     Other cytomegaloviral diseases    - detected at 370 on CMV PCR 2/1.  - case discussed with ID and will hold off on treatment at this time given low WBC.   - undetected CMV PCR 2/8.  - continue Valcyte 200 mg every Mon, Wed, Fri.  -CMV PCR 2/15 negative  -Per ID, valcyte dose decreased to ppx dose on 2/18.        Fever    - Started on vanc/cefepime for ERCP. Now transitioned to vanc/zosyn.  - Blood cultures sent, prelim NGTD. Repeat blood cultures and chest xray today 2/10.  - thoracentesis 2/1 - fluid negative for infection.  - atelectasis noted on ct scan - start breathing tx & CPT.  - now cmv positive -  started treatment 2/5/18.  - taken to OR 2/7 for exp lap - fluid collections drained, cultures pending.  - Patient with low grade fever 2/15, 100.3.  -CT A/P 2/15 with no evidence of IA fluid infection. Did show R pleural effusion s/p thoracentesis, cell count negative for infection, cx pending.  -Repeat blood cx 2/15 prelim NGTD, CMV PCR 2/15 negative   Complaining of abdominal pain. Is tender in RUQ.  -Remains with fevers  - Bilirubin remains elevated. Concerning for cholangitis. Antibiotics broadened to Zosyn, Vanc 2/17. ID re consulted.   -PBS consulted for possible ERCP.  - Given continued fevers despite negative infectious work up. Plan for ERCP today. Cont broad spectrum antibiotics.          Constipation    - Enema ordered 1/20 and 1/21 with BM.  - Encourage ambulation.  - decrease narcotics.  - d/c metamucil and miralax bid as makes patient nauseated.  - increase reglan qid.  - small bowel follow through with normal findings.   - patient reports bowel movement 2/13        Other ascites    - Paracentesis performed 1/11/18 with 1600 ml removed.    - fluid negative for infection.   - Repeat para 1/19 negative for infection per cell count.  - no fluid seen for paracentesis on 1/23.        Severe protein-calorie malnutrition    - Poor PO intake since transplant requiring short course of TPN during previous hospital stay.   - albumin remains decreased but appetite slowly improving per pt.   - Dietary consulted. Will need to closely monitor PO intake.   - supplements also ordered.   - SHANE with tube feeding start 1/16. Stopped 1/17/18, was not tolerating. Shane removed 1/20.  - prealbumin 7 on 1/15.  - TPN started 1/16/18.   - Pt able to eat minimally 1/21 and 1/22 which is an improvement.  - gi consulted - EGD reviewed, increased ppi bid, miralax bid, and metamucil bid, decreased narcotics.  - continue reglan, placed SHANE tube with tube feeds - transitioned to nightly feeds. Not tolerating well due to nausea.   -  small bowel follow through with normal findings.   - consulted gi for possible gj tube placement, holding off on this for now as with ongoing fevers.  - Marinol started with some improvement in appetite seen but now on hold as was not eating likely due to feeling worse with ongoing fevers.  -Dietary to change tube feeds back to 24 hours from nightly today as not eating enough during the day to meet caloric needs.           Nausea and vomiting    - h/o constipation, reports having regular BM's with bowel regimen.  - vomiting episode x 1 at home. Anti-emetics ordered PRN.  - KUB repeated 1/14/18 given increased abdominal pain- mild, generalized bowel distention suggesting ileus.  Diet changed to clears as tolerated for bowel rest .  - SHANE tube placed 1/16 for tube feeds. Not tolerating tube feeds, stopped 1/17/18.  - D/C shane 1/19, now in place as of 1/25.  - chronic constipation, Cont bowel regimen.   - GI consulted. See protein calorie malnutrition.  - not tolerating TFs due to nausea. Formula changed.         Recurrent pleural effusion on right    - CXR in ER with large right pleural effusion with subjective c/o worsening SOB  - thoracentesis completed- 1200 ml removed.  SOB with significant improvement.   - Cell count reviewed and negative for infection.   - resp even and non labored.  O2 sat 94-98%.  - Repeat thora 1/19 negative for infection per cell count.  - Reaccumulating fluid on xray 1/21.  - thoracentesis 1/31 - 1.4L removed, negative for infection  - Reaccumulating fluid again on xray 2/10.   -Thoracentesis performed  2/16 with 1500 cc off, cell count neg for infection, cx no growth to date.         Biliary stricture of transplanted liver    - tbili with increase prompting ERCP 1/30 with stones and sludge, stents placed.  - T bili remains elevated. Along with ongoing fevers, abdominal pain. PBS consulted for possible ERCP. Plan for ERCP today. Monitor.         Anemia of chronic disease    - H/H stable.  Cont to monitor with daily cbc.         Delayed surgical wound healing    - wd vac removed 1/12/18.  Wound healing well. Aquacel AG and Mepilex border dressing applied via wound care recs.           At risk for opportunistic infections    - CMV detected 2/1 now on tx dose valcyte. CMV PCR 2/15 not detected.   - bactrim held.            Long-term use of immunosuppressant medication    - Maintenance IS with cyclosporine. MMF on hold for infections and neutropenia. Continue to check cyclosporine level daily. Assess for toxicity and adjust level as needed.        Prophylactic immunotherapy    - See long term use of immunosuppression.         Liver transplanted    - Post op course complicated by fevers and hyperbilirubinemia.  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  - AST/ALT normal. Tbili/alkphos remain elevated.  - Liver US 1/8 showed 3.9 cm complex fluid collection anterior to right lobe and moderate nonspecific complex ascites inferior to transplant.   - IR placed drain 1/11/18, cell count negative for infection.  - PBS consulted. PBS not comfortable proceeding with EUS with liver biopsy given fever and possible infectious process going on. ERCP 1/17 with stone and sludge. Pt placed on Actigall - treated with 10 day course of abx.  ID now signed off.  - Bili elevated - liver u/s 1/28  - Liver biopsy 1/23 without rejection.  - consulted PBS for ERCP - Choledocholithiasis was found along with sludge, stents placed 1/30.  - T bili remains elevated with recent fevers.  Plan for ERCP today via PBS. Currently afebrile. Will continue broad spectrum antibiotics for now.          Acute renal failure with tubular necrosis    - HD resumed on previous admission. Now anuric and dialyzes M-W-F.  - Nephrology following.    - ktm consulted for possible kidney transplant.            VTE Risk Mitigation         Ordered     heparin (porcine) injection 1,000 Units  As needed (PRN)     Route:  Intra-Catheter         02/05/18 0945     heparin (porcine) injection 5,000 Units  Every 8 hours     Route:  Subcutaneous        01/31/18 0957     Medium Risk of VTE  Once      01/11/18 0351     Place sequential compression device  Until discontinued      01/11/18 0351          The patients clinical status was discussed at multidisplinary rounds, involving transplant surgery, transplant medicine, pharmacy, nursing, nutrition, and social work    Discharge Planning: Not a candidate for discharge at this time.       Citlali Bridges [email or web address]  Liver Transplant  [facility]

## 2018-02-19 NOTE — ASSESSMENT & PLAN NOTE
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, CMV D+/R+, steroid induction, on maintenance tacro/MMF/pred; c/b seizures, LAURA, superificial wound infection s/p wound vac to [facility] through 1/12, and several recent admissions on 11/24 and 12/3 with culture negative peritonitis with peak WBC ~5k due to suspected indolent biliary leakage s/p sphincterotomy/biliary stent placement over CBD stricture on 12/6 ERCP although notably with no overt leak noted during procedure) who was admitted on 1/11/2017 with acute onset fevers and acute on chronic N/V/abdominal pain (RLQ worst) in the setting of persistent noninflammatory ascites of unknown etiology. He underwent repeated drainage of pleural fluid and ascites that did not show evidence of gross infection and given that fevers were antibiotic responsive and walled off biloma was suspected, he underwent ex-lap on 2/8 where a walled off presumed partly bilious collection was noted in the RLQ and debrided (cultures negative). He was tapered to oral antibiotics after his procedure given negative cultures but has spiked a temperature again with a similar constellation of symptoms arguing for a similar cause. He remains tenuous.    - may continue empiric vanc/zosyn for now  - additional prophylaxis including isavuconazole per protocol  - as CT does not show additional obvious pathology, repeat cultures remain negative, and given prior history and persistently abnormal bilirubin, agree with repeat examination of his biliary system by ERCP -- appreciate assistance of PBS in this matter today

## 2018-02-20 ENCOUNTER — TELEPHONE (OUTPATIENT)
Dept: GASTROENTEROLOGY | Facility: CLINIC | Age: 28
End: 2018-02-20

## 2018-02-20 LAB
ALBUMIN SERPL BCP-MCNC: 1.7 G/DL
ALP SERPL-CCNC: 94 U/L
ALT SERPL W/O P-5'-P-CCNC: <5 U/L
AMYLASE SERPL-CCNC: 48 U/L
ANION GAP SERPL CALC-SCNC: 11 MMOL/L
ANISOCYTOSIS BLD QL SMEAR: SLIGHT
AST SERPL-CCNC: 20 U/L
BACTERIA BLD CULT: NORMAL
BACTERIA BLD CULT: NORMAL
BASOPHILS NFR BLD: 2 %
BILIRUB SERPL-MCNC: 1.8 MG/DL
BUN SERPL-MCNC: 11 MG/DL
CALCIUM SERPL-MCNC: 8.2 MG/DL
CHLORIDE SERPL-SCNC: 104 MMOL/L
CO2 SERPL-SCNC: 24 MMOL/L
CREAT SERPL-MCNC: 3.2 MG/DL
CYCLOSPORINE BLD LC/MS/MS-MCNC: 168 NG/ML
DIFFERENTIAL METHOD: ABNORMAL
EOSINOPHIL NFR BLD: 2 %
ERYTHROCYTE [DISTWIDTH] IN BLOOD BY AUTOMATED COUNT: 16.1 %
EST. GFR  (AFRICAN AMERICAN): 28.9 ML/MIN/1.73 M^2
EST. GFR  (NON AFRICAN AMERICAN): 25 ML/MIN/1.73 M^2
FUNGUS SPEC CULT: NORMAL
FUNGUS SPEC CULT: NORMAL
GLUCOSE SERPL-MCNC: 72 MG/DL
HCT VFR BLD AUTO: 23.6 %
HGB BLD-MCNC: 7.4 G/DL
HYPOCHROMIA BLD QL SMEAR: ABNORMAL
IMM GRANULOCYTES # BLD AUTO: ABNORMAL K/UL
IMM GRANULOCYTES NFR BLD AUTO: ABNORMAL %
LIPASE SERPL-CCNC: <3 U/L
LYMPHOCYTES NFR BLD: 31 %
MAGNESIUM SERPL-MCNC: 1.5 MG/DL
MCH RBC QN AUTO: 29.8 PG
MCHC RBC AUTO-ENTMCNC: 31.4 G/DL
MCV RBC AUTO: 95 FL
MONOCYTES NFR BLD: 5 %
NEUTROPHILS NFR BLD: 60 %
NRBC BLD-RTO: 0 /100 WBC
OVALOCYTES BLD QL SMEAR: ABNORMAL
PHOSPHATE SERPL-MCNC: 2.3 MG/DL
PLATELET # BLD AUTO: 135 K/UL
PLATELET BLD QL SMEAR: ABNORMAL
PMV BLD AUTO: 11 FL
POIKILOCYTOSIS BLD QL SMEAR: SLIGHT
POLYCHROMASIA BLD QL SMEAR: ABNORMAL
POTASSIUM SERPL-SCNC: 3.7 MMOL/L
PROT SERPL-MCNC: 5 G/DL
RBC # BLD AUTO: 2.48 M/UL
SODIUM SERPL-SCNC: 139 MMOL/L
VANCOMYCIN SERPL-MCNC: 23.4 UG/ML
WBC # BLD AUTO: 2.53 K/UL

## 2018-02-20 PROCEDURE — 83735 ASSAY OF MAGNESIUM: CPT

## 2018-02-20 PROCEDURE — 83690 ASSAY OF LIPASE: CPT

## 2018-02-20 PROCEDURE — 80053 COMPREHEN METABOLIC PANEL: CPT

## 2018-02-20 PROCEDURE — 84100 ASSAY OF PHOSPHORUS: CPT

## 2018-02-20 PROCEDURE — 63600175 PHARM REV CODE 636 W HCPCS: Performed by: NURSE PRACTITIONER

## 2018-02-20 PROCEDURE — 25000003 PHARM REV CODE 250: Performed by: NURSE PRACTITIONER

## 2018-02-20 PROCEDURE — 80158 DRUG ASSAY CYCLOSPORINE: CPT

## 2018-02-20 PROCEDURE — 25000003 PHARM REV CODE 250: Performed by: PHYSICIAN ASSISTANT

## 2018-02-20 PROCEDURE — 97803 MED NUTRITION INDIV SUBSEQ: CPT | Performed by: DIETITIAN, REGISTERED

## 2018-02-20 PROCEDURE — 99233 SBSQ HOSP IP/OBS HIGH 50: CPT | Mod: ,,, | Performed by: PHYSICIAN ASSISTANT

## 2018-02-20 PROCEDURE — 63600175 PHARM REV CODE 636 W HCPCS: Mod: JG | Performed by: PHYSICIAN ASSISTANT

## 2018-02-20 PROCEDURE — P9047 ALBUMIN (HUMAN), 25%, 50ML: HCPCS | Mod: JG | Performed by: PHYSICIAN ASSISTANT

## 2018-02-20 PROCEDURE — 63600175 PHARM REV CODE 636 W HCPCS: Mod: JG | Performed by: SURGERY

## 2018-02-20 PROCEDURE — 85007 BL SMEAR W/DIFF WBC COUNT: CPT

## 2018-02-20 PROCEDURE — 20600001 HC STEP DOWN PRIVATE ROOM

## 2018-02-20 PROCEDURE — 80202 ASSAY OF VANCOMYCIN: CPT

## 2018-02-20 PROCEDURE — 85027 COMPLETE CBC AUTOMATED: CPT

## 2018-02-20 PROCEDURE — 99233 SBSQ HOSP IP/OBS HIGH 50: CPT | Mod: ,,, | Performed by: INTERNAL MEDICINE

## 2018-02-20 PROCEDURE — 36415 COLL VENOUS BLD VENIPUNCTURE: CPT

## 2018-02-20 PROCEDURE — 82150 ASSAY OF AMYLASE: CPT

## 2018-02-20 PROCEDURE — 63600175 PHARM REV CODE 636 W HCPCS: Performed by: PHYSICIAN ASSISTANT

## 2018-02-20 PROCEDURE — 25000003 PHARM REV CODE 250: Performed by: SURGERY

## 2018-02-20 RX ORDER — MAGNESIUM SULFATE HEPTAHYDRATE 40 MG/ML
2 INJECTION, SOLUTION INTRAVENOUS ONCE
Status: COMPLETED | OUTPATIENT
Start: 2018-02-20 | End: 2018-02-20

## 2018-02-20 RX ORDER — SODIUM CHLORIDE 9 MG/ML
INJECTION, SOLUTION INTRAVENOUS ONCE
Status: COMPLETED | OUTPATIENT
Start: 2018-02-21 | End: 2018-02-21

## 2018-02-20 RX ORDER — SODIUM CHLORIDE 9 MG/ML
INJECTION, SOLUTION INTRAVENOUS
Status: DISCONTINUED | OUTPATIENT
Start: 2018-02-21 | End: 2018-02-22

## 2018-02-20 RX ORDER — FENTANYL CITRATE 50 UG/ML
12.5 INJECTION, SOLUTION INTRAMUSCULAR; INTRAVENOUS ONCE
Status: COMPLETED | OUTPATIENT
Start: 2018-02-20 | End: 2018-02-20

## 2018-02-20 RX ORDER — ALBUMIN HUMAN 250 G/1000ML
25 SOLUTION INTRAVENOUS ONCE
Status: COMPLETED | OUTPATIENT
Start: 2018-02-20 | End: 2018-02-20

## 2018-02-20 RX ADMIN — OXYCODONE HYDROCHLORIDE 10 MG: 5 TABLET ORAL at 05:02

## 2018-02-20 RX ADMIN — LEVETIRACETAM 500 MG: 100 SOLUTION ORAL at 08:02

## 2018-02-20 RX ADMIN — PIPERACILLIN AND TAZOBACTAM 4.5 G: 4; .5 INJECTION, POWDER, LYOPHILIZED, FOR SOLUTION INTRAVENOUS; PARENTERAL at 02:02

## 2018-02-20 RX ADMIN — ATOVAQUONE 1500 MG: 750 SUSPENSION ORAL at 09:02

## 2018-02-20 RX ADMIN — OXYCODONE HYDROCHLORIDE 10 MG: 5 TABLET ORAL at 12:02

## 2018-02-20 RX ADMIN — PROCHLORPERAZINE EDISYLATE 10 MG: 5 INJECTION INTRAMUSCULAR; INTRAVENOUS at 02:02

## 2018-02-20 RX ADMIN — ALBUMIN (HUMAN) 25 G: 12.5 SOLUTION INTRAVENOUS at 01:02

## 2018-02-20 RX ADMIN — URSODIOL 300 MG: 300 CAPSULE ORAL at 05:02

## 2018-02-20 RX ADMIN — METOCLOPRAMIDE HYDROCHLORIDE 5 MG: 5 SOLUTION ORAL at 05:02

## 2018-02-20 RX ADMIN — FENTANYL CITRATE 12.5 MCG: 50 INJECTION INTRAMUSCULAR; INTRAVENOUS at 04:02

## 2018-02-20 RX ADMIN — CYCLOSPORINE 175 MG: 100 SOLUTION ORAL at 05:02

## 2018-02-20 RX ADMIN — LEVETIRACETAM 500 MG: 100 SOLUTION ORAL at 09:02

## 2018-02-20 RX ADMIN — PROCHLORPERAZINE EDISYLATE 10 MG: 5 INJECTION INTRAMUSCULAR; INTRAVENOUS at 09:02

## 2018-02-20 RX ADMIN — ONDANSETRON HYDROCHLORIDE 4 MG: 2 INJECTION, SOLUTION INTRAMUSCULAR; INTRAVENOUS at 11:02

## 2018-02-20 RX ADMIN — METOCLOPRAMIDE HYDROCHLORIDE 5 MG: 5 SOLUTION ORAL at 09:02

## 2018-02-20 RX ADMIN — OXYCODONE HYDROCHLORIDE 10 MG: 5 TABLET ORAL at 02:02

## 2018-02-20 RX ADMIN — THERA TABS 1 TABLET: TAB at 08:02

## 2018-02-20 RX ADMIN — LEVOTHYROXINE SODIUM 75 MCG: 75 TABLET ORAL at 05:02

## 2018-02-20 RX ADMIN — URSODIOL 300 MG: 300 CAPSULE ORAL at 02:02

## 2018-02-20 RX ADMIN — OXYCODONE HYDROCHLORIDE 10 MG: 5 TABLET ORAL at 09:02

## 2018-02-20 RX ADMIN — METOCLOPRAMIDE HYDROCHLORIDE 5 MG: 5 SOLUTION ORAL at 11:02

## 2018-02-20 RX ADMIN — ONDANSETRON HYDROCHLORIDE 4 MG: 2 INJECTION, SOLUTION INTRAMUSCULAR; INTRAVENOUS at 06:02

## 2018-02-20 RX ADMIN — HEPARIN SODIUM 5000 UNITS: 5000 INJECTION, SOLUTION INTRAVENOUS; SUBCUTANEOUS at 05:02

## 2018-02-20 RX ADMIN — OMEPRAZOLE 40 MG: 40 CAPSULE, DELAYED RELEASE ORAL at 07:02

## 2018-02-20 RX ADMIN — URSODIOL 300 MG: 300 CAPSULE ORAL at 09:02

## 2018-02-20 RX ADMIN — MAGNESIUM SULFATE HEPTAHYDRATE 2 G: 40 INJECTION, SOLUTION INTRAVENOUS at 08:02

## 2018-02-20 RX ADMIN — SODIUM CHLORIDE: 9 INJECTION, SOLUTION INTRAVENOUS at 12:02

## 2018-02-20 RX ADMIN — CYCLOSPORINE 175 MG: 100 SOLUTION ORAL at 08:02

## 2018-02-20 NOTE — PLAN OF CARE
Problem: Nutrition, Imbalanced: Inadequate Oral Intake (Adult)  Intervention: Promote/Optimize Nutrition     Recommendations     Recommendation/Intervention:   -Patient tolerating new TF order better than previous one, per patient.   -Continue current order of Peptamen 1.5 w/ Prebio at goal rate of 60mL/hr. This formula is known to help with GI motility, has prebiotics, and MCT oil. Hold for residuals > 500mL. Renal lytes are WNL. -Continue regular diet.   -If oral diet improves to 25-50% of all meals, can change back to nocturnal feedings from 6pm - 9am at 60mL/hr.   -Continue to endorse placement of J-Tube if medically able.   -RD following.      Goals: Consume/tolerate > 85% EEN and EPN  Nutrition Goal Status: goal not met  Communication of RD Recs: reviewed with physician

## 2018-02-20 NOTE — PROGRESS NOTES
[facility]  Adult Nutrition  Progress Note    SUMMARY     Recommendations    Recommendation/Intervention:   -Patient tolerating new TF order better than previous one, per patient.   -Continue current order of Peptamen 1.5 w/ Prebio at goal rate of 60mL/hr. This formula is known to help with GI motility, has prebiotics, and MCT oil. Hold for residuals > 500mL. Renal lytes are WNL. -Continue regular diet.   -If oral diet improves to 25-50% of all meals, can change back to nocturnal feedings from 6pm - 9am at 60mL/hr.   -Continue to endorse placement of J-Tube if medically able.   -RD following.     Goals: Consume/tolerate > 85% EEN and EPN  Nutrition Goal Status: goal not met  Communication of RD Recs: reviewed with physician    Reason for Assessment    Reason for Assessment: RD follow-up  Diagnosis: transplant/postoperative complications  Relevent Medical History: OLTx 10/19/2017, complicate post-op w/ LAURA ongoing, chronic malnutrition, cirrhosis 2' EtOH   Interdisciplinary Rounds: attended     General Information Comments: TF switched yesterday to continuous, was able to be advanced to 40mL/hr overnight and then stopped this AM due to patient discomfort, spoke with pt this AM - believes this new formula is better tolerated, only few bites of PO diet eaten over past few days, marionol not continued    Nutrition Discharge Planning: Adequate nutrition PO vs EN    Nutrition Prescription Ordered    Current Diet Order: Regular  Nutrition Order Comments: with continuous TF  Current Nutrition Support Formula Ordered: Peptamen 1.5 Prebio  Current Nutrition Support Rate Ordered: 60 (ml)  Current Nutrition Support Frequency Ordered: mL/hr  Oral Nutrition Supplement: Boost Breeze     Evaluation of Received Nutrients/Fluid Intake    Enteral Calories (kcal): 2160 (at 40mL at visit)  Enteral Protein (gm): 98  Enteral (Free Water) Fluid (mL): 1109     Parenteral Calories (kcal): 0  Parenteral Protein (gm):  "0  Parenteral Fluid (mL): 0      Total Calories (kcal): 0     Energy Calories Required: not meeting needs  % Kcal Needs: 69       Protein Required: not meeting needs  % Protein Needs: 62      GIR (Glucose Infusion Rate) (mg/kg/min): 0 mg/kg/min  Lipid Calories (kcals): 0 kcals  I/O: +1.4L since admit       Fluid Required:  (per MD)  Comments: LBM   Tolerance: not tolerating  % Intake of Estimated Energy Needs: 50 - 75 %  % Meal Intake: 0%     Nutrition Risk Screen     Nutrition Risk Screen: large or nonhealing wound, burn or pressure ulcer    Nutrition/Diet History    Patient Reported Diet/Restrictions/Preferences: low salt  Typical Food/Fluid Intake: eats very little at this time  Food Preferences: No cultural or Anabaptist food preferences noted  Meal/Snack Patterns: pt likes to cook, when feeling well will cook and then only eat bites  Supplemental Drinks or Food Habits:  (protein powders sometimes)  Factors Affecting Nutritional Intake: altered gastrointestinal function, abdominal distention, decreased appetite, nausea/vomiting      Labs/Tests/Procedures/Meds     Pertinent Labs Reviewed: reviewed, pertinent  Pertinent Labs Comments: GFR 25, K 3.7, TBili 1.8, P 2.3, Cr 3.2  Pertinent Medications Reviewed: reviewed, pertinent  Pertinent Medications Comments: docusate, epoetin, MVI    Physical Findings    Overall Physical Appearance: generalized wasting, loss of muscle mass, loss of subcutaneous fat, weak  Tubes: nasoduoduenal tube  Oral/Mouth Cavity: WDL  Skin: intact    Anthropometrics    Temp: 100 °F (37.8 °C)     Height: 5' 6" (167.6 cm)  Weight Method: Bed Scale  Weight: 62 kg (136 lb 11 oz)  Ideal Body Weight (IBW), Male: 142 lb     % Ideal Body Weight, Male (lb): 112.68 lb     BMI (Calculated): 25.9  BMI Grade: 25 - 29.9 - overweight  Weight Loss: unintentional  Usual Body Weight (UBW), k kg (2017 )     % Usual Body Weight: 86.71  % Weight Change From Usual Weight: -13.47 %      "   Estimated/Assessed Needs    Weight Used For Calorie Calculations: 62.3 kg (137 lb 5.6 oz)   Height (cm): 167.6 cm  Energy Calorie Requirements (kcal): 3814-5883 (30-35 kcal/kg)  Energy Need Method: Kcal/kg        RMR ([place]-St. Jeor Equation): 1535.75      Weight Used For Protein Calculations: 62.3 kg (137 lb 5.6 oz)  Protein Requirements: 81-94g (1.3-1.5 g/kg)    Fluid Requirements (mL): 1mL/kcal  Fluid Need Method: RDA Method      RDA Method (mL): 1869       Assessment and Plan    Severe protein-calorie malnutrition    Nutrition Diagnosis  Inadequate oral intake    Related to (etiology):   Decreased appetite, abdominal distention    Signs and Symptoms (as evidenced by):   Pt eating 0% of meals and relying on enteral nutrition for 100% of EPN, EEN     Interventions/Recommendations (treatment strategy):  See recs    Nutrition Diagnosis Status:   Continues            Monitor and Evaluation    Food and Nutrient Intake: energy intake, food and beverage intake  Food and Nutrient Adminstration: diet order  Knowledge/Beliefs/Attitudes: food and nutrition knowledge/skill  Physical Activity and Function: nutrition-related ADLs and IADLs  Anthropometric Measurements: weight, weight change, body mass index  Biochemical Data, Medical Tests and Procedures: electrolyte and renal panel, lipid profile, gastrointestinal profile, glucose/endocrine profile, inflammatory profile  Nutrition-Focused Physical Findings: overall appearance    Nutrition Risk    Level of Risk:  (2x/week)    Nutrition Follow-Up    RD Follow-up?: Yes

## 2018-02-20 NOTE — PLAN OF CARE
Problem: Patient Care Overview  Goal: Plan of Care Review  Pt aaox4 vswnl and no c/o pain. Bed in low position and callbell within reach. Mother at bedside. Pt started on tube feeds via left nare keofeed tube ok'ed per md for use. peptumen at 20cc/hr with goal of 60cc/hr increasing by 10cc q4hrs as pt tolerates and residuals>500cc. Pt went to dialysis on dayshift and tool off 1 liter. Also, had ERCP where they replaced keofeed tube and removed stent and replaced larger one. Chevron incision with rt side drsg intact. Pt jaundice and +2-3 ble edema. Pt ambulates with walker and standby assist. Standard precautions and reverse isolation maintained.

## 2018-02-20 NOTE — ASSESSMENT & PLAN NOTE
- tbili with increase prompting ERCP 1/30 with stones and sludge, stents placed.  -  Repeat ERCP 2/19 as was having recurrent fevers with bilirubin stuck at 2.0-2.2 with overall unremarkable findings seen. Did place new larger stents. Will need repeat ERCP in 10 weeks.   -Bilirubin is improving though post ERCP, 1.8 on AM labs.

## 2018-02-20 NOTE — PLAN OF CARE
Problem: Patient Care Overview  Goal: Plan of Care Review  Outcome: Ongoing (interventions implemented as appropriate)  Pt aao x 4. Pt currently in bed with bed in lowest/locked position. Call light in reach. Free from falls/injury this shift and wearing non-skid footwear. Mother at bedside. Poor PO intake.TF currently infusing @ 50cc/hr. Pt refusing for TF to be increased, pt has stated it makes him nauseous. IV zofran and IV compazine given for c/o nausea. PO oxy 10 mg q 6 hrs given for c/o pain. Around 1600, pt in tears complaining of 8/10 back pain. Amylase and Lipase labs checked. One time dose of IV fentanyl given. Pt now resting peacefully. IVPB magnesium and Albumin given x 1. HD tomorrow.     Will continue to monitor, assess, and adjust care as needed.

## 2018-02-20 NOTE — ASSESSMENT & PLAN NOTE
- Poor PO intake since transplant requiring short course of TPN during previous hospital stay.   - albumin remains decreased but appetite slowly improving per pt.   - Dietary consulted. Will need to closely monitor PO intake.   - supplements also ordered.   - SHANE with tube feeding start 1/16. Stopped 1/17/18, was not tolerating. Shane removed 1/20.  - prealbumin 7 on 1/15.  - TPN started 1/16/18.   - Pt able to eat minimally 1/21 and 1/22 which is an improvement.  - gi consulted - EGD reviewed, increased ppi bid, miralax bid, and metamucil bid, decreased narcotics.  - continue reglan, placed SHANE tube with tube feeds - transitioned to nightly feeds. Not tolerating well due to nausea.   - small bowel follow through with normal findings.   - consulted gi for possible gj tube placement, holding off on this for now as with ongoing fevers.  - Marinol started with some improvement in appetite seen but now on hold as was not eating likely due to feeling worse with ongoing fevers.  -Dietary changed tube feeds back to 24 hours from nightly 2/19 as not eating enough during the day to meet caloric needs.

## 2018-02-20 NOTE — ANESTHESIA POSTPROCEDURE EVALUATION
"Anesthesia Post Evaluation    Patient: Jhonny Diana    Procedure(s) Performed: Procedure(s) (LRB):  ERCP (N/A)    Final Anesthesia Type: general  Patient location during evaluation: PACU  Patient participation: Yes- Able to Participate  Level of consciousness: awake and alert and oriented  Post-procedure vital signs: reviewed and stable  Pain management: adequate  Airway patency: patent  PONV status at discharge: No PONV  Anesthetic complications: no      Cardiovascular status: stable  Respiratory status: unassisted  Hydration status: euvolemic  Follow-up not needed.        Visit Vitals  /74   Pulse 76   Temp 36.8 °C (98.2 °F) (Temporal)   Resp 16   Ht 5' 6" (1.676 m)   Wt 62.3 kg (137 lb 5.6 oz)   SpO2 100%   BMI 22.17 kg/m²       Pain/Diogenes Score: Pain Assessment Performed: Yes (2/19/2018  4:30 PM)  Presence of Pain: denies (2/19/2018  4:30 PM)  Pain Rating Prior to Med Admin: 7 (2/19/2018  5:22 PM)  Pain Rating Post Med Admin: 2 (2/18/2018 11:25 PM)  Diogenes Score: 9 (2/19/2018  4:15 PM)      "

## 2018-02-20 NOTE — ASSESSMENT & PLAN NOTE
- Started on vanc/cefepime for ERCP. Now transitioned to vanc/zosyn.  - Blood cultures sent, prelim NGTD. Repeat blood cultures and chest xray today 2/10.  - thoracentesis 2/1 - fluid negative for infection.  - atelectasis noted on ct scan - start breathing tx & CPT.  - now cmv positive - started treatment 2/5/18.  - taken to OR 2/7 for exp lap - fluid collections drained, cultures pending.  - Patient with low grade fever 2/15, 100.3.  -CT A/P 2/15 with no evidence of IA fluid infection. Did show R pleural effusion s/p thoracentesis, cell count negative for infection, cx pending.  -Repeat blood cx 2/15 prelim NGTD, CMV PCR 2/15 negative   Complaining of abdominal pain. Is tender in RUQ.  -Remains with fevers  - Bilirubin remains elevated. Concerning for cholangitis. Antibiotics broadened to Zosyn, Vanc 2/17. ID re consulted.   - ERCP  2/19, overall unremarkable findings. Did place new larger stents. Will need repeat ERCP in 10 weeks. Bilirubin is improving though post ERCP, 1.8 on AM labs.   -Tmax 100.4. ID following. Plan to continue IV antibiotics for total of 2 weeks. Cont to monitor.

## 2018-02-20 NOTE — SUBJECTIVE & OBJECTIVE
Interval History: had low grade fever of 100.4F today, otherwise no change in clinical condition    Review of Systems   Constitutional: Negative for chills, fatigue and fever.   HENT: Negative for sore throat.    Respiratory: Negative for cough, chest tightness and shortness of breath.    Cardiovascular: Negative for chest pain, palpitations and leg swelling.   Gastrointestinal: Positive for abdominal pain. Negative for abdominal distention, diarrhea, nausea and vomiting.   Genitourinary: Negative for dysuria, flank pain and urgency.   Skin: Negative for rash.   Neurological: Negative for dizziness, light-headedness and numbness.   Psychiatric/Behavioral: Negative for confusion.     Objective:     Vital Signs (Most Recent):  Temp: 99.9 °F (37.7 °C) (02/20/18 0915)  Pulse: 78 (02/20/18 0749)  Resp: 16 (02/20/18 0749)  BP: 112/69 (02/20/18 0749)  SpO2: 96 % (02/20/18 0749) Vital Signs (24h Range):  Temp:  [98.2 °F (36.8 °C)-100.4 °F (38 °C)] 99.9 °F (37.7 °C)  Pulse:  [73-90] 78  Resp:  [16-20] 16  SpO2:  [95 %-100 %] 96 %  BP: (112-136)/(66-78) 112/69     Weight: 62 kg (136 lb 11 oz)  Body mass index is 22.06 kg/m².    Estimated Creatinine Clearance: 30.1 mL/min (A) (based on SCr of 3.2 mg/dL (H)).    Physical Exam   Constitutional: He is oriented to person, place, and time.   HENT:   Mouth/Throat: No oropharyngeal exudate.   Eyes: No scleral icterus.   Cardiovascular: Normal rate and regular rhythm.    Pulmonary/Chest: Effort normal and breath sounds normal. No respiratory distress. He has no wheezes. He has no rales.   Abdominal: Soft. There is tenderness. There is no rebound.   Musculoskeletal: He exhibits edema.   Lymphadenopathy:     He has no cervical adenopathy.   Neurological: He is alert and oriented to person, place, and time.   Skin: He is not diaphoretic.       Significant Labs:   Bilirubin:     Recent Labs  Lab 02/16/18  0436 02/17/18  0343 02/18/18  0505 02/19/18  0528 02/20/18  0412   BILITOT 2.2* 2.1*  2.3* 2.1* 1.8*     Blood Culture:     Recent Labs  Lab 01/28/18  1122 01/30/18  1916 02/11/18  1236 02/11/18  1241 02/15/18  1729   LABBLOO No growth after 5 days. No growth after 5 days.  No growth after 5 days. No growth after 5 days. No growth after 5 days. No Growth to date  No Growth to date  No Growth to date  No Growth to date  No Growth to date  No Growth to date  No Growth to date  No Growth to date  No Growth to date  No Growth to date     BMP:     Recent Labs  Lab 02/20/18  0412   GLU 72      K 3.7      CO2 24   BUN 11   CREATININE 3.2*   CALCIUM 8.2*   MG 1.5*     CBC:     Recent Labs  Lab 02/19/18  0528 02/20/18 0412   WBC 3.76* 2.53*   HGB 7.8* 7.4*   HCT 23.8* 23.6*   * 135*     CMP:     Recent Labs  Lab 02/19/18 0528 02/20/18  0412    139   K 4.3 3.7    104   CO2 21* 24   GLU 58* 72   BUN 26* 11   CREATININE 5.7* 3.2*   CALCIUM 9.4 8.2*   PROT 4.9* 5.0*   ALBUMIN 1.7* 1.7*   BILITOT 2.1* 1.8*   ALKPHOS 93 94   AST 19 20   ALT <5* <5*   ANIONGAP 12 11   EGFRNONAA 12.4* 25.0*     Microbiology Results (last 7 days)     Procedure Component Value Units Date/Time    Culture, Anaerobic [[id number]] Collected:  02/16/18 1411    Order Status:  Completed Specimen:  Body Fluid from Pleural Fluid Updated:  02/20/18 0918     Anaerobic Culture Culture in progress    Blood culture [[id number]] Collected:  02/15/18 1729    Order Status:  Completed Specimen:  Blood Updated:  02/19/18 2012     Blood Culture, Routine No Growth to date     Blood Culture, Routine No Growth to date     Blood Culture, Routine No Growth to date     Blood Culture, Routine No Growth to date     Blood Culture, Routine No Growth to date    Narrative:       Collection has been rescheduled by SANDerrick at 2/15/2018 16:57 Reason:   Patient getting procedure; doctor asked for lab to return later.  Collection has been rescheduled by SAN1 at 2/15/2018 16:57 Reason:   Patient getting procedure; doctor asked for lab  to return later.    Blood culture [[id number]] Collected:  02/15/18 1729    Order Status:  Completed Specimen:  Blood Updated:  02/19/18 2012     Blood Culture, Routine No Growth to date     Blood Culture, Routine No Growth to date     Blood Culture, Routine No Growth to date     Blood Culture, Routine No Growth to date     Blood Culture, Routine No Growth to date    Narrative:       Collection has been rescheduled by SAN1 at 2/15/2018 16:57 Reason:   Patient getting procedure; doctor asked for lab to return later.  Collection has been rescheduled by SAN1 at 2/15/2018 16:57 Reason:   Patient getting procedure; doctor asked for lab to return later.    Culture, Body Fluid (Aerobic) w/ GS [[id number]] Collected:  02/16/18 1411    Order Status:  Completed Specimen:  Body Fluid from Pleural Fluid Updated:  02/19/18 1053     AEROBIC CULTURE - FLUID No growth     Gram Stain Result Rare WBC's      No organisms seen    Fungus culture [[id number]] Collected:  02/16/18 1411    Order Status:  Sent Specimen:  Body Fluid from Pleural Fluid Updated:  02/16/18 1735    Blood culture [[id number]] Collected:  02/11/18 1241    Order Status:  Completed Specimen:  Blood Updated:  02/16/18 1412     Blood Culture, Routine No growth after 5 days.    Narrative:       Collection has been rescheduled by TRM at 2/11/2018 11:34 Reason: due   now  Collection has been rescheduled by TRM at 2/11/2018 11:34 Reason: due   now    Blood culture [[id number]] Collected:  02/11/18 1236    Order Status:  Completed Specimen:  Blood Updated:  02/16/18 1412     Blood Culture, Routine No growth after 5 days.    Narrative:       Collection has been rescheduled by TRM at 2/11/2018 11:34 Reason: due   now  Collection has been rescheduled by TRM at 2/11/2018 11:34 Reason: due   now    Culture, Body Fluid (Aerobic) w/ GS [[id number]]     Order Status:  Canceled Specimen:  Body Fluid from Pleural Fluid     Culture, Anaerobic [[id number]]     Order Status:  Canceled  Specimen:  Body Fluid from Pleural Fluid     Fungus culture [[id number]]     Order Status:  Canceled Specimen:  Body Fluid from Pleural Fluid     Blood culture [[id number]]     Order Status:  Canceled Specimen:  Blood     Blood culture [[id number]]     Order Status:  Canceled Specimen:  Blood     Fungus culture [[id number]] Collected:  02/07/18 1452    Order Status:  Completed Specimen:  Body Fluid from Peritoneal Fluid Updated:  02/15/18 1008     Fungus (Mycology) Culture Culture in progress    Fungus culture [[id number]] Collected:  01/31/18 1344    Order Status:  Completed Specimen:  Body Fluid from Pleural Fluid Updated:  02/15/18 0749     Fungus (Mycology) Culture Culture in progress     Fungus (Mycology) Culture No fungus isolated after 2 weeks    Narrative:       Right    Fungus culture [[id number]] Collected:  01/11/18 1441    Order Status:  Completed Specimen:  Ascites from Ascites Updated:  02/14/18 1456     Fungus (Mycology) Culture No fungus isolated after 4 weeks    Culture, Anaerobe [[id number]] Collected:  02/07/18 1452    Order Status:  Completed Specimen:  Body Fluid from Peritoneal Fluid Updated:  02/14/18 0741     Anaerobic Culture No anaerobes isolated          Significant Imaging: I have reviewed all pertinent imaging results/findings within the past 24 hours.

## 2018-02-20 NOTE — NURSING
Pt refused to increase tube feeds to 30cc/hr at 0100 and at 0300 the feeds were increased to 30cc/hr.

## 2018-02-20 NOTE — ASSESSMENT & PLAN NOTE
26 y/o man w/a history of asthma and alcoholic cirrhosis (c/b HE, EV, portal HTN, and HRS; s/p DDLT 10/19/2017, CMV D+/R+, steroid induction, on maintenance tacro/MMF/pred; c/b seizures, LAURA, superificial wound infection s/p wound vac to [facility] through 1/12, and several recent admissions on 11/24 and 12/3 with culture negative peritonitis with peak WBC ~5k due to suspected indolent biliary leakage s/p sphincterotomy/biliary stent placement over CBD stricture on 12/6 ERCP although notably with no overt leak noted during procedure) who was admitted on 1/11/2017 with acute onset fevers and acute on chronic N/V/abdominal pain (RLQ worst) in the setting of persistent noninflammatory ascites of unknown etiology. He underwent repeated drainage of pleural fluid and ascites that did not show evidence of gross infection and given that fevers were antibiotic responsive and walled off biloma was suspected, he underwent ex-lap on 2/8/18 where a walled off presumed partly bilious collection was noted in the RLQ and debrided (cultures negative). He was tapered to oral antibiotics after his procedure given negative cultures but has spiked a temperature again with a similar constellation of symptoms arguing for a similar cause. He remains tenuous.    - ERCP 2/19/18: 2 biliary tree stents removed, 2 stents placed on CBD  - continue empiric vanc/zosyn for now, plan to complete 2 weeks in view of response to therapy and no other infectious source identified  - additional prophylaxis including isavuconazole per protocol

## 2018-02-20 NOTE — TREATMENT PLAN
GI Follow-up Note    Patient seen and examined.  No new abdominal pain.  States he feels about the same today.  He had another fever, temp 100.4 at 7:49am.  Patient status post ERCP 2/19/18 with stent exchange.    ERCP 2/19/18:  Findings:       A  film of the abdomen was obtained. The bowel contained        contrast. Two stents ending in the main bile duct were seen. The        esophagus was successfully intubated under direct vision without        detailed examination of the pharynx, larynx, and associated        structures, and upper GI tract. The upper GI tract was grossly        normal. Two stents originating in the biliary tree were emerging        from the major papilla. Two stents were removed from the biliary        tree using a snare. Two stents were removed from the biliary tree        using a snare. A 0.025 inch straight standard wire was passed into        the biliary tree. The 12 mm balloon was passed over the guidewire        and the bile duct was then deeply cannulated. Contrast was injected.        I personally interpreted the bile duct images. There was brisk flow        of contrast through the ducts. Image quality was excellent. Contrast        extended to the entire biliary tree. The post-transplant donor duct        and post-transplant recipient duct were mildly dilated. There was no        stenosis noted in the post-transplant anastomosis. To discover        objects, the biliary tree was swept with a 12 mm balloon starting at        the bifurcation. Nothing was found. Two 8.5 Fr by 9 cm biliary        stents with a single external flap and a single internal flap were        placed into the common bile duct. Bile flowed through the stents.        The stents were in good position. The total fluoroscopy exposure        time was 1.7 minutes.  Impression:   - Two stents from the biliary tree were seen in the                         major papilla.                        - The post-transplant  donor duct and                         post-transplant recipient duct were mildly dilated.                        - Two stents were removed from the biliary tree.                        - The biliary tree was swept and nothing was found.                        - Two biliary stents were placed into the common                         bile duct.  Recommendation:                              - Watch for pancreatitis, bleeding, perforation,                         and cholangitis.                        - Repeat ERCP in 10 weeks to remove stent.    Thank you for allowing us to participate in the care of Jhonny Diana. Please do not hesitate to call us with questions.    Shen Sousa  Gastroenterology Fellow (PGY [id number])

## 2018-02-20 NOTE — SUBJECTIVE & OBJECTIVE
Scheduled Meds:   atovaquone  1,500 mg Oral Daily    cycloSPORINE  175 mg Oral BID    docusate sodium  100 mg Oral BID    epoetin maurisio (PROCRIT) injection  10,000 Units Intravenous Every Mon, Wed, Fri    heparin (porcine)  5,000 Units Subcutaneous Q8H    custom IVPB builder   Intravenous Q24H    levetiracetam oral soln  500 mg Oral BID    levothyroxine  75 mcg Oral Before breakfast    metoclopramide HCl  5 mg Oral QID (AC & HS)    multivitamin  1 tablet Oral Daily    omeprazole  40 mg Oral QAM    piperacillin-tazobactam (ZOSYN) IVPB  4.5 g Intravenous Q12H    polyethylene glycol  17 g Oral BID    psyllium husk (aspartame)  3.4 g Oral BID    ursodiol  300 mg Oral TID    valganciclovir 50 mg/ml  100 mg Oral Every Mon, Wed, Fri     Continuous Infusions:  PRN Meds:sodium chloride, sodium chloride 0.9%, sodium chloride 0.9%, acetaminophen, albuterol-ipratropium 2.5mg-0.5mg/3mL, bisacodyl, dextrose 50%, dextrose 50%, glucagon (human recombinant), glucose, glucose, heparin (porcine), omnipaque, ondansetron, ondansetron, oxyCODONE, prochlorperazine, simethicone, sodium chloride 0.9%, sodium chloride 0.9%, sodium chloride 0.9%    Review of Systems   Constitutional: Positive for activity change, appetite change, fatigue and fever. Negative for chills.   HENT: Negative.  Negative for congestion and facial swelling.    Eyes: Negative for pain, discharge and visual disturbance.   Respiratory: Negative for cough, chest tightness, shortness of breath and wheezing.    Cardiovascular: Negative for chest pain, palpitations and leg swelling.   Gastrointestinal: Positive for abdominal distention, abdominal pain and nausea. Negative for constipation and vomiting.   Endocrine: Negative.    Genitourinary: Positive for decreased urine volume. Negative for difficulty urinating.        Anuric   Musculoskeletal: Negative for arthralgias, back pain and myalgias.   Skin: Positive for color change and wound. Negative for rash.    Allergic/Immunologic: Positive for immunocompromised state.   Neurological: Negative for dizziness, seizures, weakness and headaches.   Psychiatric/Behavioral: Positive for decreased concentration and dysphoric mood. Negative for confusion. The patient is not nervous/anxious.    All other systems reviewed and are negative.    Objective:     Vital Signs (Most Recent):  Temp: 100 °F (37.8 °C) (02/20/18 1131)  Pulse: 84 (02/20/18 1131)  Resp: 18 (02/20/18 1131)  BP: 110/65 (02/20/18 1131)  SpO2: 96 % (02/20/18 1131) Vital Signs (24h Range):  Temp:  [98.2 °F (36.8 °C)-100.4 °F (38 °C)] 100 °F (37.8 °C)  Pulse:  [73-84] 84  Resp:  [16-20] 18  SpO2:  [95 %-100 %] 96 %  BP: (110-123)/(65-77) 110/65     Weight: 62 kg (136 lb 11 oz)  Body mass index is 22.06 kg/m².    Intake/Output - Last 3 Shifts       02/18 0700 - 02/19 0659 02/19 0700 - 02/20 0659 02/20 0700 - 02/21 0659    P.O. 270  240    Other  600     NG/ 60     IV Piggyback 200      Total Intake(mL/kg) 690 (11.1) 660 (10.6) 240 (3.9)    Other  1600     Total Output   1600      Net +690 -940 +240           Urine Occurrence 0 x      Stool Occurrence 1 x            Physical Exam   Constitutional: He is oriented to person, place, and time. He appears well-developed. No distress.   Temporal and distal extremity muscle wasting   HENT:   Head: Normocephalic and atraumatic.   Mouth/Throat: No oropharyngeal exudate.   dylan tube   Eyes: EOM are normal. Pupils are equal, round, and reactive to light. Scleral icterus is present.   Neck: Normal range of motion. Neck supple. No JVD present. No thyromegaly present.   Cardiovascular: Normal rate, regular rhythm, normal heart sounds and intact distal pulses.    No murmur heard.  Pulmonary/Chest: Effort normal. No respiratory distress. He has decreased breath sounds in the right middle field, the right lower field and the left lower field. He has no wheezes. He exhibits no tenderness.   Diminished to RLL   Abdominal: Soft.  Bowel sounds are normal. He exhibits ascites. He exhibits no distension. There is tenderness. There is no rebound and no guarding.   Dressing to chevron.  Wd vac removed 1/12/18  Dependent edema present R flank; surrounding erythema   Musculoskeletal: Normal range of motion. He exhibits edema (2+ LE edema). He exhibits no tenderness.   Neurological: He is alert and oriented to person, place, and time. He has normal reflexes.   Skin: Skin is warm and dry. Capillary refill takes 2 to 3 seconds. He is not diaphoretic. No erythema.   jaundice   Psychiatric: He has a normal mood and affect. His behavior is normal. Judgment and thought content normal. His mood appears not anxious.   Depressed mood   Nursing note and vitals reviewed.      Laboratory:  Immunosuppressants         Stop Route Frequency     cycloSPORINE (NEORAL) 100 mg/mL microemulsion solution 175 mg      -- Oral 2 times daily        CBC:     Recent Labs  Lab 02/20/18  0412   WBC 2.53*   RBC 2.48*   HGB 7.4*   HCT 23.6*   *   MCV 95   MCH 29.8   MCHC 31.4*     CMP:     Recent Labs  Lab 02/20/18  0412   GLU 72   CALCIUM 8.2*   ALBUMIN 1.7*   PROT 5.0*      K 3.7   CO2 24      BUN 11   CREATININE 3.2*   ALKPHOS 94   ALT <5*   AST 20   BILITOT 1.8*     Coagulation:     Recent Labs  Lab 02/16/18  0436   INR 1.0     Labs within the past 24 hours have been reviewed.    Diagnostic Results:  None

## 2018-02-20 NOTE — TELEPHONE ENCOUNTER
----- Message from Shen Sousa MD sent at 2/20/2018 10:01 AM CST -----  Repeat ERCP in 10 weeks to remove stent.

## 2018-02-20 NOTE — PROGRESS NOTES
[facility]  Infectious Disease  Progress Note    Patient Name: Jhonny Diana  MRN: [id number]  Admission Date: 1/11/2018  Length of Stay: 40 days  Attending Physician: Nathanael Medina MD  Primary Care Provider: Primary Doctor No    Isolation Status: No active isolations  Assessment/Plan:      Fever    26 y/o man w/a history of asthma and alcoholic cirrhosis (c/b HE, EV, portal HTN, and HRS; s/p DDLT 10/19/2017, CMV D+/R+, steroid induction, on maintenance tacro/MMF/pred; c/b seizures, LAURA, superificial wound infection s/p wound vac to [facility] through 1/12, and several recent admissions on 11/24 and 12/3 with culture negative peritonitis with peak WBC ~5k due to suspected indolent biliary leakage s/p sphincterotomy/biliary stent placement over CBD stricture on 12/6 ERCP although notably with no overt leak noted during procedure) who was admitted on 1/11/2017 with acute onset fevers and acute on chronic N/V/abdominal pain (RLQ worst) in the setting of persistent noninflammatory ascites of unknown etiology. He underwent repeated drainage of pleural fluid and ascites that did not show evidence of gross infection and given that fevers were antibiotic responsive and walled off biloma was suspected, he underwent ex-lap on 2/8/18 where a walled off presumed partly bilious collection was noted in the RLQ and debrided (cultures negative). He was tapered to oral antibiotics after his procedure given negative cultures but has spiked a temperature again with a similar constellation of symptoms arguing for a similar cause. He remains tenuous.    - ERCP 2/19/18: 2 biliary tree stents removed, 2 stents placed on CBD  - continue empiric vanc/zosyn for now, plan to complete 2 weeks in view of response to therapy and no other infectious source identified  - additional prophylaxis including isavuconazole per protocol                Anticipated Disposition: pending    Thank you for your consult. I will follow-up with  patient. Please contact us if you have any additional questions.    Rk Che MD  Infectious Disease Fellow, PGY-5  Spectra: [phone]  Pager: [phone]  [facility]    Subjective:     Principal Problem:Fever    HPI: No notes on file  Interval History: had low grade fever of 100.4F today, otherwise no change in clinical condition    Review of Systems   Constitutional: Negative for chills, fatigue and fever.   HENT: Negative for sore throat.    Respiratory: Negative for cough, chest tightness and shortness of breath.    Cardiovascular: Negative for chest pain, palpitations and leg swelling.   Gastrointestinal: Positive for abdominal pain. Negative for abdominal distention, diarrhea, nausea and vomiting.   Genitourinary: Negative for dysuria, flank pain and urgency.   Skin: Negative for rash.   Neurological: Negative for dizziness, light-headedness and numbness.   Psychiatric/Behavioral: Negative for confusion.     Objective:     Vital Signs (Most Recent):  Temp: 99.9 °F (37.7 °C) (02/20/18 0915)  Pulse: 78 (02/20/18 0749)  Resp: 16 (02/20/18 0749)  BP: 112/69 (02/20/18 0749)  SpO2: 96 % (02/20/18 0749) Vital Signs (24h Range):  Temp:  [98.2 °F (36.8 °C)-100.4 °F (38 °C)] 99.9 °F (37.7 °C)  Pulse:  [73-90] 78  Resp:  [16-20] 16  SpO2:  [95 %-100 %] 96 %  BP: (112-136)/(66-78) 112/69     Weight: 62 kg (136 lb 11 oz)  Body mass index is 22.06 kg/m².    Estimated Creatinine Clearance: 30.1 mL/min (A) (based on SCr of 3.2 mg/dL (H)).    Physical Exam   Constitutional: He is oriented to person, place, and time.   HENT:   Mouth/Throat: No oropharyngeal exudate.   Eyes: No scleral icterus.   Cardiovascular: Normal rate and regular rhythm.    Pulmonary/Chest: Effort normal and breath sounds normal. No respiratory distress. He has no wheezes. He has no rales.   Abdominal: Soft. There is tenderness. There is no rebound.   Musculoskeletal: He exhibits edema.   Lymphadenopathy:     He has no cervical  adenopathy.   Neurological: He is alert and oriented to person, place, and time.   Skin: He is not diaphoretic.       Significant Labs:   Bilirubin:     Recent Labs  Lab 02/16/18  0436 02/17/18  0343 02/18/18  0505 02/19/18  0528 02/20/18  0412   BILITOT 2.2* 2.1* 2.3* 2.1* 1.8*     Blood Culture:     Recent Labs  Lab 01/28/18  1122 01/30/18  1916 02/11/18  1236 02/11/18  1241 02/15/18  1729   LABBLOO No growth after 5 days. No growth after 5 days.  No growth after 5 days. No growth after 5 days. No growth after 5 days. No Growth to date  No Growth to date  No Growth to date  No Growth to date  No Growth to date  No Growth to date  No Growth to date  No Growth to date  No Growth to date  No Growth to date     BMP:     Recent Labs  Lab 02/20/18  0412   GLU 72      K 3.7      CO2 24   BUN 11   CREATININE 3.2*   CALCIUM 8.2*   MG 1.5*     CBC:     Recent Labs  Lab 02/19/18  0528 02/20/18  0412   WBC 3.76* 2.53*   HGB 7.8* 7.4*   HCT 23.8* 23.6*   * 135*     CMP:     Recent Labs  Lab 02/19/18  0528 02/20/18  0412    139   K 4.3 3.7    104   CO2 21* 24   GLU 58* 72   BUN 26* 11   CREATININE 5.7* 3.2*   CALCIUM 9.4 8.2*   PROT 4.9* 5.0*   ALBUMIN 1.7* 1.7*   BILITOT 2.1* 1.8*   ALKPHOS 93 94   AST 19 20   ALT <5* <5*   ANIONGAP 12 11   EGFRNONAA 12.4* 25.0*     Microbiology Results (last 7 days)     Procedure Component Value Units Date/Time    Culture, Anaerobic [[id number]] Collected:  02/16/18 1411    Order Status:  Completed Specimen:  Body Fluid from Pleural Fluid Updated:  02/20/18 0918     Anaerobic Culture Culture in progress    Blood culture [[id number]] Collected:  02/15/18 1729    Order Status:  Completed Specimen:  Blood Updated:  02/19/18 2012     Blood Culture, Routine No Growth to date     Blood Culture, Routine No Growth to date     Blood Culture, Routine No Growth to date     Blood Culture, Routine No Growth to date     Blood Culture, Routine No Growth to date     Narrative:       Collection has been rescheduled by SAN1 at 2/15/2018 16:57 Reason:   Patient getting procedure; doctor asked for lab to return later.  Collection has been rescheduled by SAN at 2/15/2018 16:57 Reason:   Patient getting procedure; doctor asked for lab to return later.    Blood culture [[id number]] Collected:  02/15/18 1729    Order Status:  Completed Specimen:  Blood Updated:  02/19/18 2012     Blood Culture, Routine No Growth to date     Blood Culture, Routine No Growth to date     Blood Culture, Routine No Growth to date     Blood Culture, Routine No Growth to date     Blood Culture, Routine No Growth to date    Narrative:       Collection has been rescheduled by [facility] at 2/15/2018 16:57 Reason:   Patient getting procedure; doctor asked for lab to return later.  Collection has been rescheduled by SAN at 2/15/2018 16:57 Reason:   Patient getting procedure; doctor asked for lab to return later.    Culture, Body Fluid (Aerobic) w/ GS [[id number]] Collected:  02/16/18 1411    Order Status:  Completed Specimen:  Body Fluid from Pleural Fluid Updated:  02/19/18 1053     AEROBIC CULTURE - FLUID No growth     Gram Stain Result Rare WBC's      No organisms seen    Fungus culture [[id number]] Collected:  02/16/18 1411    Order Status:  Sent Specimen:  Body Fluid from Pleural Fluid Updated:  02/16/18 1735    Blood culture [[id number]] Collected:  02/11/18 1241    Order Status:  Completed Specimen:  Blood Updated:  02/16/18 1412     Blood Culture, Routine No growth after 5 days.    Narrative:       Collection has been rescheduled by [facility] at 2/11/2018 11:34 Reason: due   now  Collection has been rescheduled by [facility] at 2/11/2018 11:34 Reason: due   now    Blood culture [[id number]] Collected:  02/11/18 1236    Order Status:  Completed Specimen:  Blood Updated:  02/16/18 1412     Blood Culture, Routine No growth after 5 days.    Narrative:       Collection has been rescheduled by [facility] at 2/11/2018 11:34 Reason: due    now  Collection has been rescheduled by [facility] at 2/11/2018 11:34 Reason: due   now    Culture, Body Fluid (Aerobic) w/ GS [[id number]]     Order Status:  Canceled Specimen:  Body Fluid from Pleural Fluid     Culture, Anaerobic [[id number]]     Order Status:  Canceled Specimen:  Body Fluid from Pleural Fluid     Fungus culture [[id number]]     Order Status:  Canceled Specimen:  Body Fluid from Pleural Fluid     Blood culture [[id number]]     Order Status:  Canceled Specimen:  Blood     Blood culture [[id number]]     Order Status:  Canceled Specimen:  Blood     Fungus culture [[id number]] Collected:  02/07/18 1452    Order Status:  Completed Specimen:  Body Fluid from Peritoneal Fluid Updated:  02/15/18 1008     Fungus (Mycology) Culture Culture in progress    Fungus culture [[id number]] Collected:  01/31/18 1344    Order Status:  Completed Specimen:  Body Fluid from Pleural Fluid Updated:  02/15/18 0749     Fungus (Mycology) Culture Culture in progress     Fungus (Mycology) Culture No fungus isolated after 2 weeks    Narrative:       Right    Fungus culture [[id number]] Collected:  01/11/18 1441    Order Status:  Completed Specimen:  Ascites from Ascites Updated:  02/14/18 1456     Fungus (Mycology) Culture No fungus isolated after 4 weeks    Culture, Anaerobe [[id number]] Collected:  02/07/18 1452    Order Status:  Completed Specimen:  Body Fluid from Peritoneal Fluid Updated:  02/14/18 0741     Anaerobic Culture No anaerobes isolated          Significant Imaging: I have reviewed all pertinent imaging results/findings within the past 24 hours.

## 2018-02-21 ENCOUNTER — TELEPHONE (OUTPATIENT)
Dept: GASTROENTEROLOGY | Facility: CLINIC | Age: 28
End: 2018-02-21

## 2018-02-21 DIAGNOSIS — K83.1 BILIARY STRICTURE: Primary | ICD-10-CM

## 2018-02-21 LAB
ALBUMIN SERPL BCP-MCNC: 1.9 G/DL
ALP SERPL-CCNC: 92 U/L
ALT SERPL W/O P-5'-P-CCNC: <5 U/L
ANION GAP SERPL CALC-SCNC: 11 MMOL/L
ANISOCYTOSIS BLD QL SMEAR: SLIGHT
AST SERPL-CCNC: 22 U/L
BASOPHILS # BLD AUTO: 0.01 K/UL
BASOPHILS NFR BLD: 0.4 %
BILIRUB SERPL-MCNC: 2 MG/DL
BUN SERPL-MCNC: 15 MG/DL
CALCIUM SERPL-MCNC: 8.9 MG/DL
CHLORIDE SERPL-SCNC: 103 MMOL/L
CO2 SERPL-SCNC: 24 MMOL/L
CREAT SERPL-MCNC: 4.5 MG/DL
CYCLOSPORINE BLD LC/MS/MS-MCNC: 242 NG/ML
DIFFERENTIAL METHOD: ABNORMAL
EOSINOPHIL # BLD AUTO: 0 K/UL
EOSINOPHIL NFR BLD: 1.5 %
ERYTHROCYTE [DISTWIDTH] IN BLOOD BY AUTOMATED COUNT: 15.6 %
EST. GFR  (AFRICAN AMERICAN): 19.1 ML/MIN/1.73 M^2
EST. GFR  (NON AFRICAN AMERICAN): 16.5 ML/MIN/1.73 M^2
GLUCOSE SERPL-MCNC: 78 MG/DL
HCT VFR BLD AUTO: 23 %
HGB BLD-MCNC: 7.4 G/DL
HYPOCHROMIA BLD QL SMEAR: ABNORMAL
IMM GRANULOCYTES # BLD AUTO: 0.05 K/UL
IMM GRANULOCYTES NFR BLD AUTO: 1.8 %
LYMPHOCYTES # BLD AUTO: 0.8 K/UL
LYMPHOCYTES NFR BLD: 30.9 %
MAGNESIUM SERPL-MCNC: 2.1 MG/DL
MCH RBC QN AUTO: 30.2 PG
MCHC RBC AUTO-ENTMCNC: 32.2 G/DL
MCV RBC AUTO: 94 FL
MONOCYTES # BLD AUTO: 0.6 K/UL
MONOCYTES NFR BLD: 20.2 %
NEUTROPHILS # BLD AUTO: 1.2 K/UL
NEUTROPHILS NFR BLD: 45.2 %
NRBC BLD-RTO: 0 /100 WBC
PHOSPHATE SERPL-MCNC: 2.1 MG/DL
PLATELET # BLD AUTO: 137 K/UL
PLATELET BLD QL SMEAR: ABNORMAL
PMV BLD AUTO: 10.8 FL
POTASSIUM SERPL-SCNC: 3.6 MMOL/L
PROT SERPL-MCNC: 5 G/DL
RBC # BLD AUTO: 2.45 M/UL
SODIUM SERPL-SCNC: 138 MMOL/L
VANCOMYCIN SERPL-MCNC: 20.5 UG/ML
WBC # BLD AUTO: 2.72 K/UL

## 2018-02-21 PROCEDURE — 63600175 PHARM REV CODE 636 W HCPCS: Performed by: PHYSICIAN ASSISTANT

## 2018-02-21 PROCEDURE — 80158 DRUG ASSAY CYCLOSPORINE: CPT

## 2018-02-21 PROCEDURE — 63600175 PHARM REV CODE 636 W HCPCS: Performed by: NURSE PRACTITIONER

## 2018-02-21 PROCEDURE — 25000003 PHARM REV CODE 250: Performed by: PHYSICIAN ASSISTANT

## 2018-02-21 PROCEDURE — 80053 COMPREHEN METABOLIC PANEL: CPT

## 2018-02-21 PROCEDURE — 25000003 PHARM REV CODE 250: Performed by: NURSE PRACTITIONER

## 2018-02-21 PROCEDURE — 80202 ASSAY OF VANCOMYCIN: CPT

## 2018-02-21 PROCEDURE — 90935 HEMODIALYSIS ONE EVALUATION: CPT

## 2018-02-21 PROCEDURE — 99233 SBSQ HOSP IP/OBS HIGH 50: CPT | Mod: ,,, | Performed by: INTERNAL MEDICINE

## 2018-02-21 PROCEDURE — 63600175 PHARM REV CODE 636 W HCPCS: Mod: JG | Performed by: SURGERY

## 2018-02-21 PROCEDURE — 25000003 PHARM REV CODE 250: Performed by: INTERNAL MEDICINE

## 2018-02-21 PROCEDURE — 85025 COMPLETE CBC W/AUTO DIFF WBC: CPT

## 2018-02-21 PROCEDURE — 94664 DEMO&/EVAL PT USE INHALER: CPT

## 2018-02-21 PROCEDURE — 20600001 HC STEP DOWN PRIVATE ROOM

## 2018-02-21 PROCEDURE — 99233 SBSQ HOSP IP/OBS HIGH 50: CPT | Mod: ,,, | Performed by: PHYSICIAN ASSISTANT

## 2018-02-21 PROCEDURE — 96372 THER/PROPH/DIAG INJ SC/IM: CPT

## 2018-02-21 PROCEDURE — 87040 BLOOD CULTURE FOR BACTERIA: CPT

## 2018-02-21 PROCEDURE — 25000003 PHARM REV CODE 250: Performed by: SURGERY

## 2018-02-21 PROCEDURE — 90935 HEMODIALYSIS ONE EVALUATION: CPT | Mod: ,,, | Performed by: INTERNAL MEDICINE

## 2018-02-21 PROCEDURE — 84100 ASSAY OF PHOSPHORUS: CPT

## 2018-02-21 PROCEDURE — 36415 COLL VENOUS BLD VENIPUNCTURE: CPT

## 2018-02-21 PROCEDURE — 99900035 HC TECH TIME PER 15 MIN (STAT)

## 2018-02-21 PROCEDURE — 83735 ASSAY OF MAGNESIUM: CPT

## 2018-02-21 RX ORDER — DRONABINOL 2.5 MG/1
2.5 CAPSULE ORAL DAILY
Status: DISCONTINUED | OUTPATIENT
Start: 2018-02-22 | End: 2018-03-08

## 2018-02-21 RX ORDER — OXYCODONE HYDROCHLORIDE 5 MG/1
5 TABLET ORAL ONCE
Status: COMPLETED | OUTPATIENT
Start: 2018-02-21 | End: 2018-02-21

## 2018-02-21 RX ORDER — BISACODYL 5 MG
10 TABLET, DELAYED RELEASE (ENTERIC COATED) ORAL DAILY
Status: DISCONTINUED | OUTPATIENT
Start: 2018-02-21 | End: 2018-04-10 | Stop reason: HOSPADM

## 2018-02-21 RX ADMIN — SODIUM CHLORIDE: 9 INJECTION, SOLUTION INTRAVENOUS at 12:02

## 2018-02-21 RX ADMIN — METOCLOPRAMIDE HYDROCHLORIDE 5 MG: 5 SOLUTION ORAL at 12:02

## 2018-02-21 RX ADMIN — OXYCODONE HYDROCHLORIDE 5 MG: 5 TABLET ORAL at 11:02

## 2018-02-21 RX ADMIN — VANCOMYCIN HYDROCHLORIDE 500 MG: 500 INJECTION, POWDER, LYOPHILIZED, FOR SOLUTION INTRAVENOUS at 06:02

## 2018-02-21 RX ADMIN — ERYTHROPOIETIN 10000 UNITS: 10000 INJECTION, SOLUTION INTRAVENOUS; SUBCUTANEOUS at 10:02

## 2018-02-21 RX ADMIN — OXYCODONE HYDROCHLORIDE 10 MG: 5 TABLET ORAL at 07:02

## 2018-02-21 RX ADMIN — ATOVAQUONE 1500 MG: 750 SUSPENSION ORAL at 12:02

## 2018-02-21 RX ADMIN — URSODIOL 300 MG: 300 CAPSULE ORAL at 12:02

## 2018-02-21 RX ADMIN — ONDANSETRON HYDROCHLORIDE 4 MG: 2 INJECTION, SOLUTION INTRAMUSCULAR; INTRAVENOUS at 10:02

## 2018-02-21 RX ADMIN — ACETAMINOPHEN 650 MG: 325 TABLET, FILM COATED ORAL at 07:02

## 2018-02-21 RX ADMIN — MORPHINE 100 MG: 10 SOLUTION ORAL at 05:02

## 2018-02-21 RX ADMIN — URSODIOL 300 MG: 300 CAPSULE ORAL at 10:02

## 2018-02-21 RX ADMIN — SODIUM CHLORIDE 350 ML: 0.9 INJECTION, SOLUTION INTRAVENOUS at 08:02

## 2018-02-21 RX ADMIN — LEVETIRACETAM 500 MG: 100 SOLUTION ORAL at 10:02

## 2018-02-21 RX ADMIN — CYCLOSPORINE 175 MG: 100 SOLUTION ORAL at 05:02

## 2018-02-21 RX ADMIN — OMEPRAZOLE 40 MG: 40 CAPSULE, DELAYED RELEASE ORAL at 07:02

## 2018-02-21 RX ADMIN — BISACODYL 10 MG: 5 TABLET, COATED ORAL at 05:02

## 2018-02-21 RX ADMIN — URSODIOL 300 MG: 300 CAPSULE ORAL at 05:02

## 2018-02-21 RX ADMIN — ONDANSETRON HYDROCHLORIDE 4 MG: 2 INJECTION, SOLUTION INTRAMUSCULAR; INTRAVENOUS at 02:02

## 2018-02-21 RX ADMIN — PIPERACILLIN AND TAZOBACTAM 4.5 G: 4; .5 INJECTION, POWDER, LYOPHILIZED, FOR SOLUTION INTRAVENOUS; PARENTERAL at 01:02

## 2018-02-21 RX ADMIN — METOCLOPRAMIDE HYDROCHLORIDE 5 MG: 5 SOLUTION ORAL at 10:02

## 2018-02-21 RX ADMIN — LEVETIRACETAM 500 MG: 100 SOLUTION ORAL at 05:02

## 2018-02-21 RX ADMIN — OXYCODONE HYDROCHLORIDE 10 MG: 5 TABLET ORAL at 12:02

## 2018-02-21 RX ADMIN — OXYCODONE HYDROCHLORIDE 10 MG: 5 TABLET ORAL at 06:02

## 2018-02-21 RX ADMIN — HEPARIN SODIUM 1000 UNITS: 1000 INJECTION, SOLUTION INTRAVENOUS; SUBCUTANEOUS at 10:02

## 2018-02-21 RX ADMIN — METOCLOPRAMIDE HYDROCHLORIDE 5 MG: 5 SOLUTION ORAL at 05:02

## 2018-02-21 RX ADMIN — LEVOTHYROXINE SODIUM 75 MCG: 75 TABLET ORAL at 05:02

## 2018-02-21 RX ADMIN — PIPERACILLIN AND TAZOBACTAM 4.5 G: 4; .5 INJECTION, POWDER, LYOPHILIZED, FOR SOLUTION INTRAVENOUS; PARENTERAL at 03:02

## 2018-02-21 NOTE — PROGRESS NOTES
Pt arrived via wheel chair per pt's escort,  Maintainance HD initiated via RT IJ Tunneled catheter without difficulty, good flows obtained.

## 2018-02-21 NOTE — ASSESSMENT & PLAN NOTE
- Started on vanc/cefepime for ERCP. Now transitioned to vanc/zosyn.  - Blood cultures sent, prelim NGTD. Repeat blood cultures and chest xray today 2/10.  - thoracentesis 2/1 - fluid negative for infection.  - atelectasis noted on ct scan - start breathing tx & CPT.  - now cmv positive - started treatment 2/5/18.  - taken to OR 2/7 for exp lap - fluid collections drained, cultures pending.  - Patient with low grade fever 2/15, 100.3.  -CT A/P 2/15 with no evidence of IA fluid infection. Did show R pleural effusion s/p thoracentesis, cell count negative for infection, cx pending.  -Repeat blood cx 2/15 prelim NGTD, CMV PCR 2/15 negative   Complaining of abdominal pain. Is tender in RUQ.  -Remains with fevers  - Bilirubin remains elevated. Concerning for cholangitis. Antibiotics broadened to Zosyn, Vanc 2/17. ID re consulted.   - ERCP  2/19, overall unremarkable findings. Did place new larger stents. Will need repeat ERCP in 10 weeks. Bilirubin is stable post ERCP.  -Tmax 101.2 ID following. Plan to continue IV antibiotics for total of 2 weeks (stop date: 3/3/18). Cont to monitor.

## 2018-02-21 NOTE — PROGRESS NOTES
[facility]  Infectious Disease  Progress Note    Patient Name: Jhonny Diana  MRN: [id number]  Admission Date: 1/11/2018  Length of Stay: 41 days  Attending Physician: Nathanael Medina MD  Primary Care Provider: Primary Doctor No    Isolation Status: No active isolations  Assessment/Plan:      Fever    26 y/o man w/a history of asthma and alcoholic cirrhosis (c/b HE, EV, portal HTN, and HRS; s/p DDLT 10/19/2017, CMV D+/R+, steroid induction, on maintenance tacro/MMF/pred; c/b seizures, LAURA, superificial wound infection s/p wound vac to [facility] through 1/12, and several recent admissions on 11/24 and 12/3 with culture negative peritonitis with peak WBC ~5k due to suspected indolent biliary leakage s/p sphincterotomy/biliary stent placement over CBD stricture on 12/6 ERCP although notably with no overt leak noted during procedure) who was admitted on 1/11/2017 with acute onset fevers and acute on chronic N/V/abdominal pain (RLQ worst) in the setting of persistent noninflammatory ascites of unknown etiology. He underwent repeated drainage of pleural fluid and ascites that did not show evidence of gross infection and given that fevers were antibiotic responsive and walled off biloma was suspected, he underwent ex-lap on 2/8/18 where a walled off presumed partly bilious collection was noted in the RLQ and debrided (cultures negative). He was tapered to oral antibiotics after his procedure given negative cultures but has spiked a temperature again with a similar constellation of symptoms arguing for a similar cause. ERCP 2/19/18: 2 biliary tree stents removed, 2 stents placed on CBD.  Patient still spiking fever unclear reason at this moment, but if still spiking by tomorrow would consider to broad antibiotic therapy.  - continue vancomycin and zosyn for now  - additional prophylaxis including isavuconazole per protocol                Anticipated Disposition: pending    Thank you for your consult. I will  follow-up with patient. Please contact us if you have any additional questions.    Rk Che MD  Infectious Disease Fellow, PGY-5  Spectra: [phone]  Pager: [phone]  [facility]    Subjective:     Principal Problem:Fever    HPI: No notes on file  Interval History: had fever of 101.2 F, no acute overnight events    Review of Systems   Constitutional: Negative for chills, fatigue and fever.   HENT: Negative for sore throat.    Respiratory: Negative for cough, chest tightness and shortness of breath.    Cardiovascular: Negative for chest pain, palpitations and leg swelling.   Gastrointestinal: Positive for abdominal pain. Negative for abdominal distention, diarrhea, nausea and vomiting.   Genitourinary: Negative for dysuria, flank pain and urgency.   Skin: Negative for rash.   Neurological: Negative for dizziness, light-headedness and numbness.   Psychiatric/Behavioral: Negative for confusion.     Objective:     Vital Signs (Most Recent):  Temp: 100 °F (37.8 °C) (02/21/18 0748)  Pulse: 86 (02/21/18 0800)  Resp: 18 (02/21/18 0714)  BP: 123/73 (02/21/18 0830)  SpO2: 95 % (02/21/18 0714) Vital Signs (24h Range):  Temp:  [99 °F (37.2 °C)-101.2 °F (38.4 °C)] 100 °F (37.8 °C)  Pulse:  [] 86  Resp:  [18] 18  SpO2:  [94 %-96 %] 95 %  BP: (110-132)/(65-74) 123/73     Weight: 61.8 kg (136 lb 3.9 oz)  Body mass index is 21.99 kg/m².    Estimated Creatinine Clearance: 21.4 mL/min (A) (based on SCr of 4.5 mg/dL (H)).    Physical Exam   Constitutional: He is oriented to person, place, and time.   HENT:   Mouth/Throat: No oropharyngeal exudate.   Eyes: No scleral icterus.   Cardiovascular: Normal rate and regular rhythm.    Pulmonary/Chest: Effort normal and breath sounds normal. No respiratory distress. He has no wheezes. He has no rales.   Abdominal: Soft. There is tenderness. There is no rebound.   Musculoskeletal: He exhibits edema.   Lymphadenopathy:     He has no cervical adenopathy.    Neurological: He is alert and oriented to person, place, and time.   Skin: He is not diaphoretic.       Significant Labs:   Bilirubin:     Recent Labs  Lab 02/17/18  0343 02/18/18  0505 02/19/18  0528 02/20/18  0412 02/21/18  0437   BILITOT 2.1* 2.3* 2.1* 1.8* 2.0*     Blood Culture:     Recent Labs  Lab 01/28/18  1122 01/30/18  1916 02/11/18  1236 02/11/18  1241 02/15/18  1729   LABBLOO No growth after 5 days. No growth after 5 days.  No growth after 5 days. No growth after 5 days. No growth after 5 days. No growth after 5 days.  No growth after 5 days.     BMP:     Recent Labs  Lab 02/21/18  0437   GLU 78      K 3.6      CO2 24   BUN 15   CREATININE 4.5*   CALCIUM 8.9   MG 2.1     CBC:     Recent Labs  Lab 02/20/18  0412 02/21/18  0437   WBC 2.53* 2.72*   HGB 7.4* 7.4*   HCT 23.6* 23.0*   * 137*     CMP:     Recent Labs  Lab 02/20/18  0412 02/21/18  0437    138   K 3.7 3.6    103   CO2 24 24   GLU 72 78   BUN 11 15   CREATININE 3.2* 4.5*   CALCIUM 8.2* 8.9   PROT 5.0* 5.0*   ALBUMIN 1.7* 1.9*   BILITOT 1.8* 2.0*   ALKPHOS 94 92   AST 20 22   ALT <5* <5*   ANIONGAP 11 11   EGFRNONAA 25.0* 16.5*     Microbiology Results (last 7 days)     Procedure Component Value Units Date/Time    Blood culture [[id number]] Collected:  02/21/18 0001    Order Status:  Sent Specimen:  Blood Updated:  02/21/18 0014    Blood culture [[id number]] Collected:  02/21/18 0001    Order Status:  Sent Specimen:  Blood Updated:  02/21/18 0014    Blood culture [[id number]] Collected:  02/15/18 1729    Order Status:  Completed Specimen:  Blood Updated:  02/20/18 2012     Blood Culture, Routine No growth after 5 days.    Narrative:       Collection has been rescheduled by SAN1 at 2/15/2018 16:57 Reason:   Patient getting procedure; doctor asked for lab to return later.  Collection has been rescheduled by SAN1 at 2/15/2018 16:57 Reason:   Patient getting procedure; doctor asked for lab to return later.    Blood  culture [[id number]] Collected:  02/15/18 1729    Order Status:  Completed Specimen:  Blood Updated:  02/20/18 2012     Blood Culture, Routine No growth after 5 days.    Narrative:       Collection has been rescheduled by SAN1 at 2/15/2018 16:57 Reason:   Patient getting procedure; doctor asked for lab to return later.  Collection has been rescheduled by SAN1 at 2/15/2018 16:57 Reason:   Patient getting procedure; doctor asked for lab to return later.    Fungus culture [[id number]] Collected:  01/19/18 1101    Order Status:  Completed Specimen:  Abdominal from Ascites Updated:  02/20/18 1357     Fungus (Mycology) Culture No fungus isolated after 4 weeks    Fungus culture [[id number]] Collected:  01/19/18 1140    Order Status:  Completed Specimen:  Pleural Fluid from Pleural Fluid Updated:  02/20/18 1357     Fungus (Mycology) Culture No fungus isolated after 4 weeks    Culture, Anaerobic [[id number]] Collected:  02/16/18 1411    Order Status:  Completed Specimen:  Body Fluid from Pleural Fluid Updated:  02/20/18 0918     Anaerobic Culture Culture in progress    Culture, Body Fluid (Aerobic) w/ GS [[id number]] Collected:  02/16/18 1411    Order Status:  Completed Specimen:  Body Fluid from Pleural Fluid Updated:  02/19/18 1053     AEROBIC CULTURE - FLUID No growth     Gram Stain Result Rare WBC's      No organisms seen    Fungus culture [[id number]] Collected:  02/16/18 1411    Order Status:  Sent Specimen:  Body Fluid from Pleural Fluid Updated:  02/16/18 1735    Blood culture [[id number]] Collected:  02/11/18 1241    Order Status:  Completed Specimen:  Blood Updated:  02/16/18 1412     Blood Culture, Routine No growth after 5 days.    Narrative:       Collection has been rescheduled by TRM at 2/11/2018 11:34 Reason: due   now  Collection has been rescheduled by TRM at 2/11/2018 11:34 Reason: due   now    Blood culture [[id number]] Collected:  02/11/18 1236    Order Status:  Completed Specimen:  Blood Updated:  02/16/18 1412      Blood Culture, Routine No growth after 5 days.    Narrative:       Collection has been rescheduled by TRM at 2/11/2018 11:34 Reason: due   now  Collection has been rescheduled by TRM at 2/11/2018 11:34 Reason: due   now    Culture, Body Fluid (Aerobic) w/ GS [[id number]]     Order Status:  Canceled Specimen:  Body Fluid from Pleural Fluid     Culture, Anaerobic [[id number]]     Order Status:  Canceled Specimen:  Body Fluid from Pleural Fluid     Fungus culture [[id number]]     Order Status:  Canceled Specimen:  Body Fluid from Pleural Fluid     Blood culture [[id number]]     Order Status:  Canceled Specimen:  Blood     Blood culture [[id number]]     Order Status:  Canceled Specimen:  Blood     Fungus culture [[id number]] Collected:  02/07/18 1452    Order Status:  Completed Specimen:  Body Fluid from Peritoneal Fluid Updated:  02/15/18 1008     Fungus (Mycology) Culture Culture in progress    Fungus culture [[id number]] Collected:  01/31/18 1344    Order Status:  Completed Specimen:  Body Fluid from Pleural Fluid Updated:  02/15/18 0749     Fungus (Mycology) Culture Culture in progress     Fungus (Mycology) Culture No fungus isolated after 2 weeks    Narrative:       Right    Fungus culture [[id number]] Collected:  01/11/18 1441    Order Status:  Completed Specimen:  Ascites from Ascites Updated:  02/14/18 1456     Fungus (Mycology) Culture No fungus isolated after 4 weeks          Significant Imaging: I have reviewed all pertinent imaging results/findings within the past 24 hours.

## 2018-02-21 NOTE — ASSESSMENT & PLAN NOTE
28 y/o man w/a history of asthma and alcoholic cirrhosis (c/b HE, EV, portal HTN, and HRS; s/p DDLT 10/19/2017, CMV D+/R+, steroid induction, on maintenance tacro/MMF/pred; c/b seizures, LAURA, superificial wound infection s/p wound vac to [facility] through 1/12, and several recent admissions on 11/24 and 12/3 with culture negative peritonitis with peak WBC ~5k due to suspected indolent biliary leakage s/p sphincterotomy/biliary stent placement over CBD stricture on 12/6 ERCP although notably with no overt leak noted during procedure) who was admitted on 1/11/2017 with acute onset fevers and acute on chronic N/V/abdominal pain (RLQ worst) in the setting of persistent noninflammatory ascites of unknown etiology. He underwent repeated drainage of pleural fluid and ascites that did not show evidence of gross infection and given that fevers were antibiotic responsive and walled off biloma was suspected, he underwent ex-lap on 2/8/18 where a walled off presumed partly bilious collection was noted in the RLQ and debrided (cultures negative). He was tapered to oral antibiotics after his procedure given negative cultures but has spiked a temperature again with a similar constellation of symptoms arguing for a similar cause. ERCP 2/19/18: 2 biliary tree stents removed, 2 stents placed on CBD.  Patient still spiking fever unclear reason at this moment, but if still spiking by tomorrow would consider to broad antibiotic therapy.  - continue vancomycin and zosyn for now  - additional prophylaxis including isavuconazole per protocol

## 2018-02-21 NOTE — PROGRESS NOTES
Seen today for follow up on chevron incision  Applied hydrocolloid last week to soften crusted wound base , which worked well.   Base cleaned up and medial aspect on incision epitheliazed. Has two tiny areas open and the right lateral aspect yet to heal     02/20/18 1800       Incision/Site 10/30/17 0911 abdomen transverse   Date First Assessed/Time First Assessed: 10/30/17 0911   Present Prior to Hospital Arrival?: Yes  Location: abdomen  Orientation: transverse   Incision WDL ex   Dressing Appearance Clean;Dry;Intact   Drainage Amount Small   Drainage Characteristics/Odor Serous   Appearance Pink;White;Slough   Red (%), Wound Tissue Color 80 %  (pink)   Yellow (%), Wound Tissue Color 20 %  (fibrinous slough)   Periwound Area Intact   Wound Edges Open   Wound Length (cm) 0.5   Wound Width (cm) 4   Depth (cm) 0.1  (two 0.2x0.5x0.1cm areas along incison )   Care Cleansed with:;Sterile normal saline   Dressing Hydrocolloid     Continue same  Zoë Murphy RN CWON  [id number]

## 2018-02-21 NOTE — ASSESSMENT & PLAN NOTE
- Post op course complicated by fevers and hyperbilirubinemia.  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  - AST/ALT normal. Tbili/alkphos remain elevated.  - Liver US 1/8 showed 3.9 cm complex fluid collection anterior to right lobe and moderate nonspecific complex ascites inferior to transplant.   - IR placed drain 1/11/18, cell count negative for infection.  - PBS consulted. PBS not comfortable proceeding with EUS with liver biopsy given fever and possible infectious process going on. ERCP 1/17 with stone and sludge. Pt placed on Actigall - treated with 10 day course of abx.  ID now signed off.  - Bili elevated - liver u/s 1/28  - Liver biopsy 1/23 without rejection.  - consulted PBS for ERCP - Choledocholithiasis was found along with sludge, stents placed 1/30.  - ERCP 2/19, overall unremarkable findings. Did place new larger stents. Will need repeat ERCP in 10 weeks. Bilirubin is improving though post ERCP.

## 2018-02-21 NOTE — PLAN OF CARE
Problem: Patient Care Overview  Goal: Plan of Care Review  3.30 HR HD completed, net fluid [id number] mls, target goal achieved. Pt tolerated well. Dialysis catheter ports locked with HEPARIN 1000units/ml to fill lumen volume, ends capped and secured. Report given to SISSY Gonzales.

## 2018-02-21 NOTE — SUBJECTIVE & OBJECTIVE
Scheduled Meds:   atovaquone  1,500 mg Oral Daily    bisacodyl  10 mg Oral Daily    cycloSPORINE  175 mg Oral BID    docusate sodium  100 mg Oral BID    [START ON 2/22/2018] dronabinol  2.5 mg Oral Daily    epoetin maurisio (PROCRIT) injection  10,000 Units Intravenous Every Mon, Wed, Fri    heparin (porcine)  5,000 Units Subcutaneous Q8H    custom IVPB builder   Intravenous Q24H    levetiracetam oral soln  500 mg Oral BID    levothyroxine  75 mcg Oral Before breakfast    metoclopramide HCl  5 mg Oral QID (AC & HS)    multivitamin  1 tablet Oral Daily    omeprazole  40 mg Oral QAM    piperacillin-tazobactam (ZOSYN) IVPB  4.5 g Intravenous Q12H    polyethylene glycol  17 g Oral BID    psyllium husk (aspartame)  3.4 g Oral BID    ursodiol  300 mg Oral TID    valganciclovir 50 mg/ml  100 mg Oral Every Mon, Wed, Fri    vancomycin (VANCOCIN) IVPB  500 mg Intravenous Every Mon, Wed, Fri     Continuous Infusions:  PRN Meds:sodium chloride, sodium chloride 0.9%, acetaminophen, albuterol-ipratropium 2.5mg-0.5mg/3mL, bisacodyl, dextrose 50%, dextrose 50%, glucagon (human recombinant), glucose, glucose, heparin (porcine), omnipaque, ondansetron, ondansetron, oxyCODONE, prochlorperazine, simethicone, sodium chloride 0.9%, sodium chloride 0.9%, sodium chloride 0.9%    Review of Systems   Constitutional: Positive for activity change, appetite change, fatigue and fever. Negative for chills.   HENT: Negative.  Negative for congestion and facial swelling.    Eyes: Negative for pain, discharge and visual disturbance.   Respiratory: Negative for cough, chest tightness, shortness of breath and wheezing.    Cardiovascular: Negative for chest pain, palpitations and leg swelling.   Gastrointestinal: Positive for abdominal distention, abdominal pain and nausea. Negative for constipation and vomiting.   Endocrine: Negative.    Genitourinary: Positive for decreased urine volume. Negative for difficulty urinating.        Anuric    Musculoskeletal: Negative for arthralgias, back pain and myalgias.   Skin: Positive for color change and wound. Negative for rash.   Allergic/Immunologic: Positive for immunocompromised state.   Neurological: Negative for dizziness, seizures, weakness and headaches.   Psychiatric/Behavioral: Positive for decreased concentration and dysphoric mood. Negative for confusion. The patient is not nervous/anxious.    All other systems reviewed and are negative.    Objective:     Vital Signs (Most Recent):  Temp: 99.2 °F (37.3 °C) (02/21/18 1228)  Pulse: 93 (02/21/18 1228)  Resp: 18 (02/21/18 0714)  BP: 128/71 (02/21/18 1228)  SpO2: 96 % (02/21/18 1228) Vital Signs (24h Range):  Temp:  [99 °F (37.2 °C)-101.2 °F (38.4 °C)] 99.2 °F (37.3 °C)  Pulse:  [] 93  Resp:  [18] 18  SpO2:  [94 %-96 %] 96 %  BP: (120-144)/(66-85) 128/71     Weight: 61.8 kg (136 lb 3.9 oz)  Body mass index is 21.99 kg/m².    Intake/Output - Last 3 Shifts       02/19 0700 - 02/20 0659 02/20 0700 - 02/21 0659 02/21 0700 - 02/22 0659    P.O.  609     Other 600      NG/GT 60      IV Piggyback  450     Total Intake(mL/kg) 660 (10.6) 1059 (17.1)     Urine (mL/kg/hr)  0 (0)     Other 1600 (1.1)      Total Output 1600 0      Net -940 +1059                   Physical Exam   Constitutional: He is oriented to person, place, and time. He appears well-developed. No distress.   Temporal and distal extremity muscle wasting   HENT:   Head: Normocephalic and atraumatic.   Mouth/Throat: No oropharyngeal exudate.   dylan tube   Eyes: EOM are normal. Pupils are equal, round, and reactive to light. Scleral icterus is present.   Neck: Normal range of motion. Neck supple. No JVD present. No thyromegaly present.   Cardiovascular: Normal rate, regular rhythm, normal heart sounds and intact distal pulses.    No murmur heard.  Pulmonary/Chest: Effort normal. No respiratory distress. He has decreased breath sounds in the right middle field, the right lower field and the left  lower field. He has no wheezes. He exhibits no tenderness.   Diminished to RLL   Abdominal: Soft. Bowel sounds are normal. He exhibits ascites. He exhibits no distension. There is tenderness. There is no rebound and no guarding.   Dressing to chevron.  Wd vac removed 1/12/18  Dependent edema present R flank; surrounding erythema   Musculoskeletal: Normal range of motion. He exhibits edema (2+ LE edema). He exhibits no tenderness.   Neurological: He is alert and oriented to person, place, and time. He has normal reflexes.   Skin: Skin is warm and dry. Capillary refill takes 2 to 3 seconds. He is not diaphoretic. No erythema.   jaundice   Psychiatric: He has a normal mood and affect. His behavior is normal. Judgment and thought content normal. His mood appears not anxious.   Depressed mood   Nursing note and vitals reviewed.      Laboratory:  Immunosuppressants         Stop Route Frequency     cycloSPORINE (NEORAL) 100 mg/mL microemulsion solution 175 mg      -- Oral 2 times daily        CBC:   Recent Labs  Lab 02/21/18  0437   WBC 2.72*   RBC 2.45*   HGB 7.4*   HCT 23.0*   *   MCV 94   MCH 30.2   MCHC 32.2     CMP:   Recent Labs  Lab 02/21/18  0437   GLU 78   CALCIUM 8.9   ALBUMIN 1.9*   PROT 5.0*      K 3.6   CO2 24      BUN 15   CREATININE 4.5*   ALKPHOS 92   ALT <5*   AST 22   BILITOT 2.0*     Labs within the past 24 hours have been reviewed.    Diagnostic Results:  pertinent imaging reviewed

## 2018-02-21 NOTE — PROGRESS NOTES
[facility]  Liver Transplant  Progress Note    Patient Name: Jhonny Diana  MRN: [id number]  Admission Date: 2018  Hospital Length of Stay: 41 days  Code Status: Full Code  Primary Care Provider: Primary Doctor No  Post-Operative Day: 125    ORGAN:   LIVER  Disease Etiology: Acute Alcoholic Hepatitis  Donor Type:    - Brain Death  CDC High Risk:   No  Donor CMV Status:   Donor CMV Status: Positive  Donor HBcAB:   Negative  Donor HCV Status:   Negative  Whole or Partial: Whole Liver  Biliary Anastomosis: End to End  Arterial Anatomy: Standard  Subjective:     History of Present Illness:  Jhonny Diana is a 26 y/o male with past medical history of alcoholic cirrhosis.  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 Found to have peritonitis in November (WBC 5000, 75% PNM) neg for bile leak. He was treated initially with vanc/cefepime. Repeat cell counts  with some improvement (WBC 1400, 45% PNM). He has undergone multiple paracenteses as well as abscess drainage of perihepatic fluid collections and treated with antimicrobial therapy but no positive cultures. Of note, biliary stricture also identified and ERCP performed on 2017 with sphincterotomy and biliary stent placed. Liver tests still have not normalized despite intervention, bilirubin and AP remain elevated. Other pertinent PMH current wound vac in place 2/2 wound infection, malnutrition requiring TPN for short course and ongoing hypoalbuminemia, and seizure activity while on prograf and has since been switched to cyclosporine without reoccurrence.  He presented to the ER for fever, abdominal pain, and N/V. He reports fever (103) for 1  day prior. Overnight, he developed N/V, reports small amount of green emesis with new left sided pain. He also endorses worsening SOB with exertion. He was scheduled as an outpatient for follow up paracentesis and IR drainage of fluid collection. CXR in ER shows large pleural effusion with subsegmental atelectasis. Infectious work up initiated in ER. His ANC is 900. Broad spectrum antibiotics initiated in ED. He denies chest pain, palpitations, diarrhea, constipation, or back pain. Denies any sick contacts.    Hospital Course:  Thoracentesis (1.2L off), Paracentesis (1.6L off), and IR drainage of fluid collection 1/11, all fluids negative for infection. ID consulted. Broad spectrum antibiotics d/c'd 1/15. Chronically malnourished with poor PO intake, prealbumin 7. Shane tube placed 1/15 for tube feedings.     ERCP 1/17 with sludge and a biliary stone which was removed and stent exchanged.  Remained with n/v; therefore, TF remained on hold and TPN continued.   Pt with fever s/p ERCP on 1/17 which continued until 1/19.  Vanc/cefepime restarted.  Blood cx 1/17 and 1/18 NGTD.  ID reconsulted.  Fungal markers sent.  CT C/A/P obtained.  With large R pleural effusion and ascites- both drained 1/19 and negative for infx per cell count. Pt afebrile 1/20. Liver biopsy 1/23 - without rejection.  EGD 1/25 - unremarkable for source of GI symptoms.  Resume diet along with other GI recommendations. SHANE tube placed for tube feeds.       Interval History: Pt spiked temp of 101.2 overnight, now down to 99.3. Resent Bcx overnight, prelim NGTD. Cont on IV vanc/zosyn x2 weeks per ID. If pt decompensates or fevers worsen, will consider transitioning to Ertapenem. Patient with some R sided abdominal erythema and some fluid/edema, ?cellulitis forming. Assessed by myself and surgeon, will continue to monitor on IV abx.  Does have some nausea this AM. TF have been of since 6pm yesterday. Will continue to hold through today and will consider  restart tomorrow. Pt with continued abdominal fullness- will obtain KUB. Declines colace, miralax, metamucil, but willing to take daily bisacodyl. Will also retrial Marinol tonight as pt reported improvement in appetite previously. Will monitor closely for sedation.     Scheduled Meds:   atovaquone  1,500 mg Oral Daily    bisacodyl  10 mg Oral Daily    cycloSPORINE  175 mg Oral BID    docusate sodium  100 mg Oral BID    [START ON 2/22/2018] dronabinol  2.5 mg Oral Daily    epoetin maurisio (PROCRIT) injection  10,000 Units Intravenous Every Mon, Wed, Fri    heparin (porcine)  5,000 Units Subcutaneous Q8H    custom IVPB builder   Intravenous Q24H    levetiracetam oral soln  500 mg Oral BID    levothyroxine  75 mcg Oral Before breakfast    metoclopramide HCl  5 mg Oral QID (AC & HS)    multivitamin  1 tablet Oral Daily    omeprazole  40 mg Oral QAM    piperacillin-tazobactam (ZOSYN) IVPB  4.5 g Intravenous Q12H    polyethylene glycol  17 g Oral BID    psyllium husk (aspartame)  3.4 g Oral BID    ursodiol  300 mg Oral TID    valganciclovir 50 mg/ml  100 mg Oral Every Mon, Wed, Fri    vancomycin (VANCOCIN) IVPB  500 mg Intravenous Every Mon, Wed, Fri     Continuous Infusions:  PRN Meds:sodium chloride, sodium chloride 0.9%, acetaminophen, albuterol-ipratropium 2.5mg-0.5mg/3mL, bisacodyl, dextrose 50%, dextrose 50%, glucagon (human recombinant), glucose, glucose, heparin (porcine), omnipaque, ondansetron, ondansetron, oxyCODONE, prochlorperazine, simethicone, sodium chloride 0.9%, sodium chloride 0.9%, sodium chloride 0.9%    Review of Systems   Constitutional: Positive for activity change, appetite change, fatigue and fever. Negative for chills.   HENT: Negative.  Negative for congestion and facial swelling.    Eyes: Negative for pain, discharge and visual disturbance.   Respiratory: Negative for cough, chest tightness, shortness of breath and wheezing.    Cardiovascular: Negative for chest pain,  palpitations and leg swelling.   Gastrointestinal: Positive for abdominal distention, abdominal pain and nausea. Negative for constipation and vomiting.   Endocrine: Negative.    Genitourinary: Positive for decreased urine volume. Negative for difficulty urinating.        Anuric   Musculoskeletal: Negative for arthralgias, back pain and myalgias.   Skin: Positive for color change and wound. Negative for rash.   Allergic/Immunologic: Positive for immunocompromised state.   Neurological: Negative for dizziness, seizures, weakness and headaches.   Psychiatric/Behavioral: Positive for decreased concentration and dysphoric mood. Negative for confusion. The patient is not nervous/anxious.    All other systems reviewed and are negative.    Objective:     Vital Signs (Most Recent):  Temp: 99.2 °F (37.3 °C) (02/21/18 1228)  Pulse: 93 (02/21/18 1228)  Resp: 18 (02/21/18 0714)  BP: 128/71 (02/21/18 1228)  SpO2: 96 % (02/21/18 1228) Vital Signs (24h Range):  Temp:  [99 °F (37.2 °C)-101.2 °F (38.4 °C)] 99.2 °F (37.3 °C)  Pulse:  [] 93  Resp:  [18] 18  SpO2:  [94 %-96 %] 96 %  BP: (120-144)/(66-85) 128/71     Weight: 61.8 kg (136 lb 3.9 oz)  Body mass index is 21.99 kg/m².    Intake/Output - Last 3 Shifts       02/19 0700 - 02/20 0659 02/20 0700 - 02/21 0659 02/21 0700 - 02/22 0659    P.O.  609     Other 600      NG/GT 60      IV Piggyback  450     Total Intake(mL/kg) 660 (10.6) 1059 (17.1)     Urine (mL/kg/hr)  0 (0)     Other 1600 (1.1)      Total Output 1600 0      Net -940 +1059                   Physical Exam   Constitutional: He is oriented to person, place, and time. He appears well-developed. No distress.   Temporal and distal extremity muscle wasting   HENT:   Head: Normocephalic and atraumatic.   Mouth/Throat: No oropharyngeal exudate.   dylan tube   Eyes: EOM are normal. Pupils are equal, round, and reactive to light. Scleral icterus is present.   Neck: Normal range of motion. Neck supple. No JVD present. No  thyromegaly present.   Cardiovascular: Normal rate, regular rhythm, normal heart sounds and intact distal pulses.    No murmur heard.  Pulmonary/Chest: Effort normal. No respiratory distress. He has decreased breath sounds in the right middle field, the right lower field and the left lower field. He has no wheezes. He exhibits no tenderness.   Diminished to RLL   Abdominal: Soft. Bowel sounds are normal. He exhibits ascites. He exhibits no distension. There is tenderness. There is no rebound and no guarding.   Dressing to chevron.  Wd vac removed 1/12/18  Dependent edema present R flank; surrounding erythema   Musculoskeletal: Normal range of motion. He exhibits edema (2+ LE edema). He exhibits no tenderness.   Neurological: He is alert and oriented to person, place, and time. He has normal reflexes.   Skin: Skin is warm and dry. Capillary refill takes 2 to 3 seconds. He is not diaphoretic. No erythema.   jaundice   Psychiatric: He has a normal mood and affect. His behavior is normal. Judgment and thought content normal. His mood appears not anxious.   Depressed mood   Nursing note and vitals reviewed.      Laboratory:  Immunosuppressants         Stop Route Frequency     cycloSPORINE (NEORAL) 100 mg/mL microemulsion solution 175 mg      -- Oral 2 times daily        CBC:   Recent Labs  Lab 02/21/18  0437   WBC 2.72*   RBC 2.45*   HGB 7.4*   HCT 23.0*   *   MCV 94   MCH 30.2   MCHC 32.2     CMP:   Recent Labs  Lab 02/21/18  0437   GLU 78   CALCIUM 8.9   ALBUMIN 1.9*   PROT 5.0*      K 3.6   CO2 24      BUN 15   CREATININE 4.5*   ALKPHOS 92   ALT <5*   AST 22   BILITOT 2.0*     Labs within the past 24 hours have been reviewed.    Diagnostic Results:  pertinent imaging reviewed    Assessment/Plan:     Fever    - Started on vanc/cefepime for ERCP. Now transitioned to vanc/zosyn.  - Blood cultures sent, prelim NGTD. Repeat blood cultures and chest xray today 2/10.  - thoracentesis 2/1 - fluid negative  for infection.  - atelectasis noted on ct scan - start breathing tx & CPT.  - now cmv positive - started treatment 2/5/18.  - taken to OR 2/7 for exp lap - fluid collections drained, cultures pending.  - Patient with low grade fever 2/15, 100.3.  -CT A/P 2/15 with no evidence of IA fluid infection. Did show R pleural effusion s/p thoracentesis, cell count negative for infection, cx pending.  -Repeat blood cx 2/15 prelim NGTD, CMV PCR 2/15 negative   Complaining of abdominal pain. Is tender in RUQ.  -Remains with fevers  - Bilirubin remains elevated. Concerning for cholangitis. Antibiotics broadened to Zosyn, Vanc 2/17. ID re consulted.   - ERCP  2/19, overall unremarkable findings. Did place new larger stents. Will need repeat ERCP in 10 weeks. Bilirubin is stable post ERCP.  -Tmax 101.2 ID following. Plan to continue IV antibiotics for total of 2 weeks (stop date: 3/3/18). Cont to monitor.             Biliary stricture of transplanted liver    - tbili with increase prompting ERCP 1/30 with stones and sludge, stents placed.  -  Repeat ERCP 2/19 as was having recurrent fevers with bilirubin stuck at 2.0-2.2 with overall unremarkable findings seen. Did place new larger stents. Will need repeat ERCP in 10 weeks.   -Bilirubin at 2.0 today        Nausea and vomiting    - h/o constipation, reports having regular BM's with bowel regimen.  - vomiting episode x 1 at home. Anti-emetics ordered PRN.  - KUB repeated 1/14/18 given increased abdominal pain- mild, generalized bowel distention suggesting ileus.  Diet changed to clears as tolerated for bowel rest .  - SHANE tube placed 1/16 for tube feeds. Not tolerating tube feeds, stopped 1/17/18.  - D/C shane 1/19, now in place as of 1/25.  - chronic constipation, Cont bowel regimen.   - GI consulted. See protein calorie malnutrition.          Severe protein-calorie malnutrition    - Poor PO intake since transplant requiring short course of TPN during previous hospital stay.   -  albumin remains decreased but appetite slowly improving per pt.   - Dietary consulted. Will need to closely monitor PO intake.   - supplements also ordered.   - SHANE with tube feeding start 1/16. Stopped 1/17/18, was not tolerating. Shane removed 1/20.  - prealbumin 7 on 1/15.  - TPN started 1/16/18.   - Pt able to eat minimally 1/21 and 1/22 which is an improvement.  - gi consulted - EGD reviewed, increased ppi bid, miralax bid, and metamucil bid, decreased narcotics.  - continue reglan, placed SHANE tube with tube feeds - transitioned to nightly feeds. Not tolerating well due to nausea.   - small bowel follow through with normal findings.   - consulted gi for possible gj tube placement, holding off on this for now as with ongoing fevers.  -Dietary changed tube feeds back to 24 hours from nightly 2/19 as not eating enough during the day to meet caloric needs.   - retrial marinol 2/21 as pt reports that's the only thing helping his appetite, in the evening 2/2 concern for possible drowsiness          Constipation    - Enema ordered 1/20 and 1/21 with BM.  - Encourage ambulation.  - decrease narcotics.  - d/c metamucil and miralax bid as makes patient nauseated.  - increase reglan qid.  - small bowel follow through with normal findings.   - patient reports last BM 2/19   -schedule bisacodyl, obtain KUB        Liver transplanted    - Post op course complicated by fevers and hyperbilirubinemia.  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  - AST/ALT normal. Tbili/alkphos remain elevated.  - Liver US 1/8 showed 3.9 cm complex fluid collection anterior to right lobe and moderate nonspecific complex ascites inferior to transplant.   - IR placed drain 1/11/18, cell count negative for infection.  - PBS consulted. PBS not comfortable proceeding with EUS with liver biopsy given fever and possible infectious process going on. ERCP 1/17 with stone and sludge. Pt placed on Actigall - treated with 10 day course  of abx.  ID now signed off.  - Bili elevated - liver u/s 1/28  - Liver biopsy 1/23 without rejection.  - consulted PBS for ERCP - Choledocholithiasis was found along with sludge, stents placed 1/30.  - ERCP 2/19, overall unremarkable findings. Did place new larger stents. Will need repeat ERCP in 10 weeks. Bilirubin is improving though post ERCP.        Long-term use of immunosuppressant medication    - Maintenance IS with cyclosporine. MMF on hold for infections and neutropenia. Continue to check cyclosporine level daily. Assess for toxicity and adjust level as needed.        At risk for opportunistic infections    - CMV detected 2/1 now on tx dose valcyte. CMV PCR 2/15 not detected.   - bactrim held.            Prophylactic immunotherapy    - See long term use of immunosuppression.         Delayed surgical wound healing    - wd vac removed 1/12/18.  Wound healing well. Aquacel AG and Mepilex border dressing applied via wound care recs.           Anemia of chronic disease    - H/H stable. Cont to monitor with daily cbc.         Other cytomegaloviral diseases    - detected at 370 on CMV PCR 2/1.  - case discussed with ID and will hold off on treatment at this time given low WBC.   - undetected CMV PCR 2/8.  - continue Valcyte 200 mg every Mon, Wed, Fri.  -CMV PCR 2/15 negative  -Per ID, valcyte dose decreased to ppx dose on 2/18.        Other ascites    - Paracentesis performed 1/11/18 with 1600 ml removed.    - fluid negative for infection.   - Repeat para 1/19 negative for infection per cell count.  - no fluid seen for paracentesis on 1/23.        Recurrent pleural effusion on right    - CXR in ER with large right pleural effusion with subjective c/o worsening SOB  - thoracentesis completed- 1200 ml removed.  SOB with significant improvement.   - Cell count reviewed and negative for infection.   - resp even and non labored.  O2 sat 94-98%.  - Repeat thora 1/19 negative for infection per cell count.  -  Reaccumulating fluid on xray 1/21.  - thoracentesis 1/31 - 1.4L removed, negative for infection  - Reaccumulating fluid again on xray 2/10.   -Thoracentesis performed  2/16 with 1500 cc off, cell count neg for infection, cx no growth to date.         Acute renal failure with tubular necrosis    - HD resumed on previous admission. Now anuric and dialyzes M-W-F.  - Nephrology following.    - ktm consulted for possible kidney transplant.            VTE Risk Mitigation         Ordered     heparin (porcine) injection 1,000 Units  As needed (PRN)     Route:  Intra-Catheter        02/05/18 0945     heparin (porcine) injection 5,000 Units  Every 8 hours     Route:  Subcutaneous        01/31/18 0957     Medium Risk of VTE  Once      01/11/18 0351     Place sequential compression device  Until discontinued      01/11/18 0351          The patients clinical status was discussed at multidisplinary rounds, involving transplant surgery, transplant medicine, pharmacy, nursing, nutrition, and social work    Discharge Planning: Not a candidate for d/c at this time.       Fransisca Thomas PA-C  Liver Transplant  [facility]

## 2018-02-21 NOTE — ASSESSMENT & PLAN NOTE
- tbili with increase prompting ERCP 1/30 with stones and sludge, stents placed.  -  Repeat ERCP 2/19 as was having recurrent fevers with bilirubin stuck at 2.0-2.2 with overall unremarkable findings seen. Did place new larger stents. Will need repeat ERCP in 10 weeks.   -Bilirubin at 2.0 today

## 2018-02-21 NOTE — PROGRESS NOTES
Pt called in this RN about feeling nauseated. TF up to 50 cc/hr for 1.5 hours. Pt asked for TF to be paused. IV zofran given. WCTM.

## 2018-02-21 NOTE — ASSESSMENT & PLAN NOTE
- Enema ordered 1/20 and 1/21 with BM.  - Encourage ambulation.  - decrease narcotics.  - d/c metamucil and miralax bid as makes patient nauseated.  - increase reglan qid.  - small bowel follow through with normal findings.   - patient reports last BM 2/19   -schedule bisacodyl, obtain KUB

## 2018-02-21 NOTE — PROGRESS NOTES
SW presented to patient for follow up and continuity of care.  The patient was not present as he was currently in HD. The patient mom Sanjuanita was present in his room.  SW met with the mother of the patient who reported adequate coping and made appropriate eye contact when communicating with SW. Mother of the patient denied any needs or concerns at this time.  Sanjuanita reported that she is doing well but hopes the patient gets well soon. Mom reports pt is searching for normalcy.  SW provided emotional support, reflective listening and normalization.  No discharge plans identified at this time. Patient verbalized understanding and agreement with the information reviewed, social work availability, and how to access available resources if needed. SW remains available.

## 2018-02-21 NOTE — SUBJECTIVE & OBJECTIVE
Interval History: had fever of 101.2 F, no acute overnight events    Review of Systems   Constitutional: Negative for chills, fatigue and fever.   HENT: Negative for sore throat.    Respiratory: Negative for cough, chest tightness and shortness of breath.    Cardiovascular: Negative for chest pain, palpitations and leg swelling.   Gastrointestinal: Positive for abdominal pain. Negative for abdominal distention, diarrhea, nausea and vomiting.   Genitourinary: Negative for dysuria, flank pain and urgency.   Skin: Negative for rash.   Neurological: Negative for dizziness, light-headedness and numbness.   Psychiatric/Behavioral: Negative for confusion.     Objective:     Vital Signs (Most Recent):  Temp: 100 °F (37.8 °C) (02/21/18 0748)  Pulse: 86 (02/21/18 0800)  Resp: 18 (02/21/18 0714)  BP: 123/73 (02/21/18 0830)  SpO2: 95 % (02/21/18 0714) Vital Signs (24h Range):  Temp:  [99 °F (37.2 °C)-101.2 °F (38.4 °C)] 100 °F (37.8 °C)  Pulse:  [] 86  Resp:  [18] 18  SpO2:  [94 %-96 %] 95 %  BP: (110-132)/(65-74) 123/73     Weight: 61.8 kg (136 lb 3.9 oz)  Body mass index is 21.99 kg/m².    Estimated Creatinine Clearance: 21.4 mL/min (A) (based on SCr of 4.5 mg/dL (H)).    Physical Exam   Constitutional: He is oriented to person, place, and time.   HENT:   Mouth/Throat: No oropharyngeal exudate.   Eyes: No scleral icterus.   Cardiovascular: Normal rate and regular rhythm.    Pulmonary/Chest: Effort normal and breath sounds normal. No respiratory distress. He has no wheezes. He has no rales.   Abdominal: Soft. There is tenderness. There is no rebound.   Musculoskeletal: He exhibits edema.   Lymphadenopathy:     He has no cervical adenopathy.   Neurological: He is alert and oriented to person, place, and time.   Skin: He is not diaphoretic.       Significant Labs:   Bilirubin:     Recent Labs  Lab 02/17/18  0343 02/18/18  0505 02/19/18  0528 02/20/18  0412 02/21/18  0437   BILITOT 2.1* 2.3* 2.1* 1.8* 2.0*     Blood  Culture:     Recent Labs  Lab 01/28/18  1122 01/30/18  1916 02/11/18  1236 02/11/18  1241 02/15/18  1729   LABBLOO No growth after 5 days. No growth after 5 days.  No growth after 5 days. No growth after 5 days. No growth after 5 days. No growth after 5 days.  No growth after 5 days.     BMP:     Recent Labs  Lab 02/21/18  0437   GLU 78      K 3.6      CO2 24   BUN 15   CREATININE 4.5*   CALCIUM 8.9   MG 2.1     CBC:     Recent Labs  Lab 02/20/18 0412 02/21/18 0437   WBC 2.53* 2.72*   HGB 7.4* 7.4*   HCT 23.6* 23.0*   * 137*     CMP:     Recent Labs  Lab 02/20/18 0412 02/21/18 0437    138   K 3.7 3.6    103   CO2 24 24   GLU 72 78   BUN 11 15   CREATININE 3.2* 4.5*   CALCIUM 8.2* 8.9   PROT 5.0* 5.0*   ALBUMIN 1.7* 1.9*   BILITOT 1.8* 2.0*   ALKPHOS 94 92   AST 20 22   ALT <5* <5*   ANIONGAP 11 11   EGFRNONAA 25.0* 16.5*     Microbiology Results (last 7 days)     Procedure Component Value Units Date/Time    Blood culture [[id number]] Collected:  02/21/18 0001    Order Status:  Sent Specimen:  Blood Updated:  02/21/18 0014    Blood culture [[id number]] Collected:  02/21/18 0001    Order Status:  Sent Specimen:  Blood Updated:  02/21/18 0014    Blood culture [[id number]] Collected:  02/15/18 1729    Order Status:  Completed Specimen:  Blood Updated:  02/20/18 2012     Blood Culture, Routine No growth after 5 days.    Narrative:       Collection has been rescheduled by SAN1 at 2/15/2018 16:57 Reason:   Patient getting procedure; doctor asked for lab to return later.  Collection has been rescheduled by SAN1 at 2/15/2018 16:57 Reason:   Patient getting procedure; doctor asked for lab to return later.    Blood culture [[id number]] Collected:  02/15/18 1729    Order Status:  Completed Specimen:  Blood Updated:  02/20/18 2012     Blood Culture, Routine No growth after 5 days.    Narrative:       Collection has been rescheduled by SAN1 at 2/15/2018 16:57 Reason:   Patient getting procedure;  doctor asked for lab to return later.  Collection has been rescheduled by [facility] at 2/15/2018 16:57 Reason:   Patient getting procedure; doctor asked for lab to return later.    Fungus culture [[id number]] Collected:  01/19/18 1101    Order Status:  Completed Specimen:  Abdominal from Ascites Updated:  02/20/18 1357     Fungus (Mycology) Culture No fungus isolated after 4 weeks    Fungus culture [[id number]] Collected:  01/19/18 1140    Order Status:  Completed Specimen:  Pleural Fluid from Pleural Fluid Updated:  02/20/18 1357     Fungus (Mycology) Culture No fungus isolated after 4 weeks    Culture, Anaerobic [[id number]] Collected:  02/16/18 1411    Order Status:  Completed Specimen:  Body Fluid from Pleural Fluid Updated:  02/20/18 0918     Anaerobic Culture Culture in progress    Culture, Body Fluid (Aerobic) w/ GS [[id number]] Collected:  02/16/18 1411    Order Status:  Completed Specimen:  Body Fluid from Pleural Fluid Updated:  02/19/18 1053     AEROBIC CULTURE - FLUID No growth     Gram Stain Result Rare WBC's      No organisms seen    Fungus culture [[id number]] Collected:  02/16/18 1411    Order Status:  Sent Specimen:  Body Fluid from Pleural Fluid Updated:  02/16/18 1735    Blood culture [[id number]] Collected:  02/11/18 1241    Order Status:  Completed Specimen:  Blood Updated:  02/16/18 1412     Blood Culture, Routine No growth after 5 days.    Narrative:       Collection has been rescheduled by TRM at 2/11/2018 11:34 Reason: due   now  Collection has been rescheduled by TRM at 2/11/2018 11:34 Reason: due   now    Blood culture [[id number]] Collected:  02/11/18 1236    Order Status:  Completed Specimen:  Blood Updated:  02/16/18 1412     Blood Culture, Routine No growth after 5 days.    Narrative:       Collection has been rescheduled by TRM at 2/11/2018 11:34 Reason: due   now  Collection has been rescheduled by TRM at 2/11/2018 11:34 Reason: due   now    Culture, Body Fluid (Aerobic) w/ GS [[id number]]      Order Status:  Canceled Specimen:  Body Fluid from Pleural Fluid     Culture, Anaerobic [[id number]]     Order Status:  Canceled Specimen:  Body Fluid from Pleural Fluid     Fungus culture [[id number]]     Order Status:  Canceled Specimen:  Body Fluid from Pleural Fluid     Blood culture [[id number]]     Order Status:  Canceled Specimen:  Blood     Blood culture [[id number]]     Order Status:  Canceled Specimen:  Blood     Fungus culture [[id number]] Collected:  02/07/18 1452    Order Status:  Completed Specimen:  Body Fluid from Peritoneal Fluid Updated:  02/15/18 1008     Fungus (Mycology) Culture Culture in progress    Fungus culture [[id number]] Collected:  01/31/18 1344    Order Status:  Completed Specimen:  Body Fluid from Pleural Fluid Updated:  02/15/18 0749     Fungus (Mycology) Culture Culture in progress     Fungus (Mycology) Culture No fungus isolated after 2 weeks    Narrative:       Right    Fungus culture [[id number]] Collected:  01/11/18 1441    Order Status:  Completed Specimen:  Ascites from Ascites Updated:  02/14/18 1456     Fungus (Mycology) Culture No fungus isolated after 4 weeks          Significant Imaging: I have reviewed all pertinent imaging results/findings within the past 24 hours.

## 2018-02-21 NOTE — ASSESSMENT & PLAN NOTE
- Poor PO intake since transplant requiring short course of TPN during previous hospital stay.   - albumin remains decreased but appetite slowly improving per pt.   - Dietary consulted. Will need to closely monitor PO intake.   - supplements also ordered.   - SHANE with tube feeding start 1/16. Stopped 1/17/18, was not tolerating. Shane removed 1/20.  - prealbumin 7 on 1/15.  - TPN started 1/16/18.   - Pt able to eat minimally 1/21 and 1/22 which is an improvement.  - gi consulted - EGD reviewed, increased ppi bid, miralax bid, and metamucil bid, decreased narcotics.  - continue reglan, placed SHANE tube with tube feeds - transitioned to nightly feeds. Not tolerating well due to nausea.   - small bowel follow through with normal findings.   - consulted gi for possible gj tube placement, holding off on this for now as with ongoing fevers.  -Dietary changed tube feeds back to 24 hours from nightly 2/19 as not eating enough during the day to meet caloric needs.   - retrial marinol 2/21 as pt reports that's the only thing helping his appetite, in the evening 2/2 concern for possible drowsiness

## 2018-02-21 NOTE — PLAN OF CARE
"Problem: Patient Care Overview  Goal: Plan of Care Review  Outcome: Ongoing (interventions implemented as appropriate)  Pt aao x4. Pt currently in bed with bed in lowest/locked position. Call light in reach. Free from falls/injury this shift and wearing non-skid footwear. Mother at bedside. 2L removed in HD. Pt c/o "fullness" and redness/swelling to right side of abdomen. PA notified, PA and surgeon to bedside to assess patient. TF on hold today and possible restart tomorrow per RIMA Lenz note. Pt has received IV zofran for c/o nausea. PO oxy 10 mg q 6 hrs given for c/o pain. Abdominal xray obtained. Tmax 100.7. Pt has received IV zosyn and IV vanc to start this evening q MWF. PO dulcolax 10 mg started today. Pt to begin receiving Marinol 2.5 mg tomorrow with dinner. PO intake encouraged, pt has verbalized understanding.     Will continue to monitor, assess, and adjust care as needed.       "

## 2018-02-21 NOTE — NURSING
SAMREEN west notified pt refusing miralax,metamucil,colace and heparin. Pt walking halls and had bm yesterday and emesis today. Pt also refusing tube feeds secondary to nausea and emesis. Compazine and reglan given on my shift and on dayshift including zofran. Pt continues to complain of nausea. Temp 100.1 continues from dayshift.

## 2018-02-22 ENCOUNTER — TELEPHONE (OUTPATIENT)
Dept: TRANSPLANT | Facility: CLINIC | Age: 28
End: 2018-02-22

## 2018-02-22 LAB
ALBUMIN SERPL BCP-MCNC: 1.8 G/DL
ALP SERPL-CCNC: 88 U/L
ALT SERPL W/O P-5'-P-CCNC: <5 U/L
ANION GAP SERPL CALC-SCNC: 10 MMOL/L
ANISOCYTOSIS BLD QL SMEAR: SLIGHT
AST SERPL-CCNC: 21 U/L
BASOPHILS # BLD AUTO: ABNORMAL K/UL
BASOPHILS NFR BLD: 1 %
BILIRUB SERPL-MCNC: 2.1 MG/DL
BUN SERPL-MCNC: 10 MG/DL
CALCIUM SERPL-MCNC: 8.4 MG/DL
CHLORIDE SERPL-SCNC: 106 MMOL/L
CO2 SERPL-SCNC: 23 MMOL/L
CREAT SERPL-MCNC: 3.4 MG/DL
CYCLOSPORINE BLD LC/MS/MS-MCNC: 208 NG/ML
DIFFERENTIAL METHOD: ABNORMAL
EOSINOPHIL # BLD AUTO: ABNORMAL K/UL
EOSINOPHIL NFR BLD: 2 %
ERYTHROCYTE [DISTWIDTH] IN BLOOD BY AUTOMATED COUNT: 16.1 %
EST. GFR  (AFRICAN AMERICAN): 26.8 ML/MIN/1.73 M^2
EST. GFR  (NON AFRICAN AMERICAN): 23.2 ML/MIN/1.73 M^2
GLUCOSE SERPL-MCNC: 71 MG/DL
HCT VFR BLD AUTO: 22.5 %
HGB BLD-MCNC: 7.4 G/DL
IMM GRANULOCYTES # BLD AUTO: ABNORMAL K/UL
IMM GRANULOCYTES NFR BLD AUTO: ABNORMAL %
LYMPHOCYTES # BLD AUTO: ABNORMAL K/UL
LYMPHOCYTES NFR BLD: 38 %
MAGNESIUM SERPL-MCNC: 1.7 MG/DL
MCH RBC QN AUTO: 31.6 PG
MCHC RBC AUTO-ENTMCNC: 32.9 G/DL
MCV RBC AUTO: 96 FL
MONOCYTES # BLD AUTO: ABNORMAL K/UL
MONOCYTES NFR BLD: 15 %
NEUTROPHILS NFR BLD: 44 %
NRBC BLD-RTO: 0 /100 WBC
OVALOCYTES BLD QL SMEAR: ABNORMAL
PHOSPHATE SERPL-MCNC: 2.4 MG/DL
PLATELET # BLD AUTO: 141 K/UL
PLATELET BLD QL SMEAR: ABNORMAL
PMV BLD AUTO: 10.9 FL
POIKILOCYTOSIS BLD QL SMEAR: SLIGHT
POTASSIUM SERPL-SCNC: 3.7 MMOL/L
PROT SERPL-MCNC: 4.9 G/DL
RBC # BLD AUTO: 2.34 M/UL
SODIUM SERPL-SCNC: 139 MMOL/L
WBC # BLD AUTO: 2.35 K/UL

## 2018-02-22 PROCEDURE — 63600175 PHARM REV CODE 636 W HCPCS: Performed by: PHYSICIAN ASSISTANT

## 2018-02-22 PROCEDURE — 85027 COMPLETE CBC AUTOMATED: CPT

## 2018-02-22 PROCEDURE — 36415 COLL VENOUS BLD VENIPUNCTURE: CPT

## 2018-02-22 PROCEDURE — 25000003 PHARM REV CODE 250: Performed by: PHYSICIAN ASSISTANT

## 2018-02-22 PROCEDURE — 25000003 PHARM REV CODE 250: Performed by: NURSE PRACTITIONER

## 2018-02-22 PROCEDURE — 80158 DRUG ASSAY CYCLOSPORINE: CPT

## 2018-02-22 PROCEDURE — 84100 ASSAY OF PHOSPHORUS: CPT

## 2018-02-22 PROCEDURE — 80053 COMPREHEN METABOLIC PANEL: CPT

## 2018-02-22 PROCEDURE — 63600175 PHARM REV CODE 636 W HCPCS: Mod: JG | Performed by: SURGERY

## 2018-02-22 PROCEDURE — 63600175 PHARM REV CODE 636 W HCPCS: Performed by: NURSE PRACTITIONER

## 2018-02-22 PROCEDURE — 20600001 HC STEP DOWN PRIVATE ROOM

## 2018-02-22 PROCEDURE — 83735 ASSAY OF MAGNESIUM: CPT

## 2018-02-22 PROCEDURE — 97803 MED NUTRITION INDIV SUBSEQ: CPT | Performed by: DIETITIAN, REGISTERED

## 2018-02-22 PROCEDURE — 25000003 PHARM REV CODE 250: Performed by: STUDENT IN AN ORGANIZED HEALTH CARE EDUCATION/TRAINING PROGRAM

## 2018-02-22 PROCEDURE — 85007 BL SMEAR W/DIFF WBC COUNT: CPT

## 2018-02-22 PROCEDURE — 25000003 PHARM REV CODE 250: Performed by: SURGERY

## 2018-02-22 PROCEDURE — 99233 SBSQ HOSP IP/OBS HIGH 50: CPT | Mod: ,,, | Performed by: INTERNAL MEDICINE

## 2018-02-22 RX ORDER — SODIUM CHLORIDE 9 MG/ML
INJECTION, SOLUTION INTRAVENOUS ONCE
Status: COMPLETED | OUTPATIENT
Start: 2018-02-23 | End: 2018-02-23

## 2018-02-22 RX ORDER — SODIUM CHLORIDE 9 MG/ML
INJECTION, SOLUTION INTRAVENOUS
Status: DISCONTINUED | OUTPATIENT
Start: 2018-02-23 | End: 2018-02-25

## 2018-02-22 RX ADMIN — OXYCODONE HYDROCHLORIDE 10 MG: 5 TABLET ORAL at 10:02

## 2018-02-22 RX ADMIN — PIPERACILLIN AND TAZOBACTAM 4.5 G: 4; .5 INJECTION, POWDER, LYOPHILIZED, FOR SOLUTION INTRAVENOUS; PARENTERAL at 02:02

## 2018-02-22 RX ADMIN — BISACODYL 10 MG: 10 SUPPOSITORY RECTAL at 11:02

## 2018-02-22 RX ADMIN — ATOVAQUONE 1500 MG: 750 SUSPENSION ORAL at 09:02

## 2018-02-22 RX ADMIN — CYCLOSPORINE 175 MG: 100 SOLUTION ORAL at 08:02

## 2018-02-22 RX ADMIN — DRONABINOL 2.5 MG: 2.5 CAPSULE ORAL at 06:02

## 2018-02-22 RX ADMIN — OXYCODONE HYDROCHLORIDE 10 MG: 5 TABLET ORAL at 03:02

## 2018-02-22 RX ADMIN — SODIUM CHLORIDE: 9 INJECTION, SOLUTION INTRAVENOUS at 12:02

## 2018-02-22 RX ADMIN — CYCLOSPORINE 175 MG: 100 SOLUTION ORAL at 06:02

## 2018-02-22 RX ADMIN — ONDANSETRON HYDROCHLORIDE 4 MG: 2 INJECTION, SOLUTION INTRAMUSCULAR; INTRAVENOUS at 08:02

## 2018-02-22 RX ADMIN — ONDANSETRON HYDROCHLORIDE 4 MG: 2 INJECTION, SOLUTION INTRAMUSCULAR; INTRAVENOUS at 06:02

## 2018-02-22 RX ADMIN — OXYCODONE HYDROCHLORIDE 10 MG: 5 TABLET ORAL at 07:02

## 2018-02-22 RX ADMIN — URSODIOL 300 MG: 300 CAPSULE ORAL at 09:02

## 2018-02-22 RX ADMIN — LEVOTHYROXINE SODIUM 75 MCG: 75 TABLET ORAL at 06:02

## 2018-02-22 RX ADMIN — METOCLOPRAMIDE HYDROCHLORIDE 5 MG: 5 SOLUTION ORAL at 01:02

## 2018-02-22 RX ADMIN — OMEPRAZOLE 40 MG: 40 CAPSULE, DELAYED RELEASE ORAL at 06:02

## 2018-02-22 RX ADMIN — METOCLOPRAMIDE HYDROCHLORIDE 5 MG: 5 SOLUTION ORAL at 06:02

## 2018-02-22 RX ADMIN — BISACODYL 10 MG: 5 TABLET, COATED ORAL at 09:02

## 2018-02-22 RX ADMIN — LEVETIRACETAM 500 MG: 100 SOLUTION ORAL at 08:02

## 2018-02-22 RX ADMIN — URSODIOL 300 MG: 300 CAPSULE ORAL at 02:02

## 2018-02-22 RX ADMIN — URSODIOL 300 MG: 300 CAPSULE ORAL at 06:02

## 2018-02-22 RX ADMIN — SODIUM CHLORIDE 0.5 G: 0.9 INJECTION, SOLUTION INTRAVENOUS at 08:02

## 2018-02-22 RX ADMIN — LEVETIRACETAM 500 MG: 100 SOLUTION ORAL at 09:02

## 2018-02-22 RX ADMIN — METOCLOPRAMIDE HYDROCHLORIDE 5 MG: 5 SOLUTION ORAL at 08:02

## 2018-02-22 NOTE — PROGRESS NOTES
[facility]  Infectious Disease  Progress Note    Patient Name: Jhonny Diana  MRN: [id number]  Admission Date: 1/11/2018  Length of Stay: 42 days  Attending Physician: Nathanael Medina MD  Primary Care Provider: Primary Doctor No    Isolation Status: No active isolations  Assessment/Plan:      Fever    28 y/o man w/a history of asthma and alcoholic cirrhosis (c/b HE, EV, portal HTN, and HRS; s/p DDLT 10/19/2017, CMV D+/R+, steroid induction, on maintenance tacro/MMF/pred; c/b seizures, LAURA, superificial wound infection s/p wound vac to [facility] through 1/12, and several recent admissions on 11/24 and 12/3 with culture negative peritonitis with peak WBC ~5k due to suspected indolent biliary leakage s/p sphincterotomy/biliary stent placement over CBD stricture on 12/6 ERCP although notably with no overt leak noted during procedure) who was admitted on 1/11/2017 with acute onset fevers and acute on chronic N/V/abdominal pain (RLQ worst) in the setting of persistent noninflammatory ascites of unknown etiology. He underwent repeated drainage of pleural fluid and ascites that did not show evidence of gross infection and given that fevers were antibiotic responsive and walled off biloma was suspected, he underwent ex-lap on 2/8/18 where a walled off presumed partly bilious collection was noted in the RLQ and debrided (cultures negative). He was tapered to oral antibiotics after his procedure given negative cultures but has spiked a temperature again with a similar constellation of symptoms arguing for a similar cause. ERCP 2/19/18: 2 biliary tree stents removed, 2 stents placed on CBD.    - Antibiotic therapy changed to Ertapenem in view that patient still spiking fever and known to be colonized with K. pneumoniae ESBL (respiratory culture from 10/6/17)                  Anticipated Disposition: pending    Thank you for your consult. I will follow-up with patient. Please contact us if you have any additional  questions.    Rk Che MD  Infectious Disease Fellow, PGY-5  Spectra: [phone]  Pager: [phone]  [facility]    Subjective:     Principal Problem:Fever    HPI: No notes on file  Interval History: had fever of 101 F, otherwise no changes in clinical pictures    Review of Systems   Constitutional: Negative for chills, fatigue and fever.   HENT: Negative for sore throat.    Respiratory: Negative for cough, chest tightness and shortness of breath.    Cardiovascular: Negative for chest pain, palpitations and leg swelling.   Gastrointestinal: Positive for abdominal pain. Negative for abdominal distention, diarrhea, nausea and vomiting.   Genitourinary: Negative for dysuria, flank pain and urgency.   Skin: Negative for rash.   Neurological: Negative for dizziness, light-headedness and numbness.   Psychiatric/Behavioral: Negative for confusion.     Objective:     Vital Signs (Most Recent):  Temp: 98.6 °F (37 °C) (02/22/18 0343)  Pulse: 80 (02/22/18 0343)  Resp: 18 (02/22/18 0343)  BP: 130/80 (02/22/18 0343)  SpO2: 96 % (02/22/18 0343) Vital Signs (24h Range):  Temp:  [98.6 °F (37 °C)-101 °F (38.3 °C)] 98.6 °F (37 °C)  Pulse:  [73-93] 80  Resp:  [16-20] 18  SpO2:  [95 %-97 %] 96 %  BP: (118-144)/(64-85) 130/80     Weight: 64 kg (141 lb 1.5 oz)  Body mass index is 22.77 kg/m².    Estimated Creatinine Clearance: 29.2 mL/min (A) (based on SCr of 3.4 mg/dL (H)).    Physical Exam   Constitutional: He is oriented to person, place, and time.   HENT:   Mouth/Throat: No oropharyngeal exudate.   Eyes: No scleral icterus.   Cardiovascular: Normal rate and regular rhythm.    Pulmonary/Chest: Effort normal and breath sounds normal. No respiratory distress. He has no wheezes. He has no rales.   Abdominal: Soft. There is tenderness. There is no rebound.   Musculoskeletal: He exhibits edema.   Lymphadenopathy:     He has no cervical adenopathy.   Neurological: He is alert and oriented to person, place, and time.    Skin: He is not diaphoretic.       Significant Labs:   Bilirubin:     Recent Labs  Lab 02/18/18  0505 02/19/18  0528 02/20/18  0412 02/21/18  0437 02/22/18  0547   BILITOT 2.3* 2.1* 1.8* 2.0* 2.1*     Blood Culture:     Recent Labs  Lab 01/30/18  1916 02/11/18  1236 02/11/18  1241 02/15/18  1729 02/21/18  0001   LABBLOO No growth after 5 days.  No growth after 5 days. No growth after 5 days. No growth after 5 days. No growth after 5 days.  No growth after 5 days. No Growth to date  No Growth to date  No Growth to date  No Growth to date     BMP:     Recent Labs  Lab 02/22/18  0547   GLU 71      K 3.7      CO2 23   BUN 10   CREATININE 3.4*   CALCIUM 8.4*   MG 1.7     CBC:     Recent Labs  Lab 02/21/18  0437 02/22/18  0547   WBC 2.72* 2.35*   HGB 7.4* 7.4*   HCT 23.0* 22.5*   * 141*     CMP:     Recent Labs  Lab 02/21/18  0437 02/22/18  0547    139   K 3.6 3.7    106   CO2 24 23   GLU 78 71   BUN 15 10   CREATININE 4.5* 3.4*   CALCIUM 8.9 8.4*   PROT 5.0* 4.9*   ALBUMIN 1.9* 1.8*   BILITOT 2.0* 2.1*   ALKPHOS 92 88   AST 22 21   ALT <5* <5*   ANIONGAP 11 10   EGFRNONAA 16.5* 23.2*     Microbiology Results (last 7 days)     Procedure Component Value Units Date/Time    Fungus culture [[id number]] Collected:  02/07/18 1452    Order Status:  Completed Specimen:  Body Fluid from Peritoneal Fluid Updated:  02/22/18 0810     Fungus (Mycology) Culture Culture in progress     Fungus (Mycology) Culture No fungus isolated after 2 weeks    Fungus culture [[id number]] Collected:  02/16/18 1411    Order Status:  Completed Specimen:  Body Fluid from Pleural Fluid Updated:  02/22/18 0717     Fungus (Mycology) Culture Culture in progress    Narrative:       Right    Blood culture [[id number]] Collected:  02/21/18 0001    Order Status:  Completed Specimen:  Blood Updated:  02/22/18 0612     Blood Culture, Routine No Growth to date     Blood Culture, Routine No Growth to date    Blood culture  [[id number]] Collected:  02/21/18 0001    Order Status:  Completed Specimen:  Blood Updated:  02/22/18 0612     Blood Culture, Routine No Growth to date     Blood Culture, Routine No Growth to date    Blood culture [[id number]] Collected:  02/15/18 1729    Order Status:  Completed Specimen:  Blood Updated:  02/20/18 2012     Blood Culture, Routine No growth after 5 days.    Narrative:       Collection has been rescheduled by SAN1 at 2/15/2018 16:57 Reason:   Patient getting procedure; doctor asked for lab to return later.  Collection has been rescheduled by SAN1 at 2/15/2018 16:57 Reason:   Patient getting procedure; doctor asked for lab to return later.    Blood culture [[id number]] Collected:  02/15/18 1729    Order Status:  Completed Specimen:  Blood Updated:  02/20/18 2012     Blood Culture, Routine No growth after 5 days.    Narrative:       Collection has been rescheduled by SAN1 at 2/15/2018 16:57 Reason:   Patient getting procedure; doctor asked for lab to return later.  Collection has been rescheduled by SAN1 at 2/15/2018 16:57 Reason:   Patient getting procedure; doctor asked for lab to return later.    Fungus culture [[id number]] Collected:  01/19/18 1101    Order Status:  Completed Specimen:  Abdominal from Ascites Updated:  02/20/18 1357     Fungus (Mycology) Culture No fungus isolated after 4 weeks    Fungus culture [[id number]] Collected:  01/19/18 1140    Order Status:  Completed Specimen:  Pleural Fluid from Pleural Fluid Updated:  02/20/18 1357     Fungus (Mycology) Culture No fungus isolated after 4 weeks    Culture, Anaerobic [[id number]] Collected:  02/16/18 1411    Order Status:  Completed Specimen:  Body Fluid from Pleural Fluid Updated:  02/20/18 0918     Anaerobic Culture Culture in progress    Culture, Body Fluid (Aerobic) w/ GS [[id number]] Collected:  02/16/18 1411    Order Status:  Completed Specimen:  Body Fluid from Pleural Fluid Updated:  02/19/18 1053     AEROBIC CULTURE - FLUID No growth      Gram Stain Result Rare WBC's      No organisms seen    Blood culture [[id number]] Collected:  02/11/18 1241    Order Status:  Completed Specimen:  Blood Updated:  02/16/18 1412     Blood Culture, Routine No growth after 5 days.    Narrative:       Collection has been rescheduled by TRM at 2/11/2018 11:34 Reason: due   now  Collection has been rescheduled by TRM at 2/11/2018 11:34 Reason: due   now    Blood culture [[id number]] Collected:  02/11/18 1236    Order Status:  Completed Specimen:  Blood Updated:  02/16/18 1412     Blood Culture, Routine No growth after 5 days.    Narrative:       Collection has been rescheduled by TRM at 2/11/2018 11:34 Reason: due   now  Collection has been rescheduled by TRM at 2/11/2018 11:34 Reason: due   now    Culture, Body Fluid (Aerobic) w/ GS [[id number]]     Order Status:  Canceled Specimen:  Body Fluid from Pleural Fluid     Culture, Anaerobic [[id number]]     Order Status:  Canceled Specimen:  Body Fluid from Pleural Fluid     Fungus culture [[id number]]     Order Status:  Canceled Specimen:  Body Fluid from Pleural Fluid     Blood culture [[id number]]     Order Status:  Canceled Specimen:  Blood     Blood culture [[id number]]     Order Status:  Canceled Specimen:  Blood           Significant Imaging: I have reviewed all pertinent imaging results/findings within the past 24 hours.

## 2018-02-22 NOTE — PLAN OF CARE
Problem: Patient Care Overview  Goal: Plan of Care Review  Outcome: Ongoing (interventions implemented as appropriate)  Pt AAOx4, VSS, T max 101.  Administered PO tylenol for pt spiked temp @ 2000.  Pt c/o abdominal pain with mild relief to PO oxycodone.  IV zosyn and Iv cresemba administered during shift.  Pt mom remain at pt bedside.  Fall risk precautions initiated.  Pt in lowest bed position setting, lighting adjusted, pt to wear nonskid socks when ambulating, side rails up x2.  Pt remain free from falls during shift.   Pt verbalize understanding to call when needed assistance.  Call light within reach.  Will continue to monitor.

## 2018-02-22 NOTE — ASSESSMENT & PLAN NOTE
26 y/o man w/a history of asthma and alcoholic cirrhosis (c/b HE, EV, portal HTN, and HRS; s/p DDLT 10/19/2017, CMV D+/R+, steroid induction, on maintenance tacro/MMF/pred; c/b seizures, LAURA, superificial wound infection s/p wound vac to [facility] through 1/12, and several recent admissions on 11/24 and 12/3 with culture negative peritonitis with peak WBC ~5k due to suspected indolent biliary leakage s/p sphincterotomy/biliary stent placement over CBD stricture on 12/6 ERCP although notably with no overt leak noted during procedure) who was admitted on 1/11/2017 with acute onset fevers and acute on chronic N/V/abdominal pain (RLQ worst) in the setting of persistent noninflammatory ascites of unknown etiology. He underwent repeated drainage of pleural fluid and ascites that did not show evidence of gross infection and given that fevers were antibiotic responsive and walled off biloma was suspected, he underwent ex-lap on 2/8/18 where a walled off presumed partly bilious collection was noted in the RLQ and debrided (cultures negative). He was tapered to oral antibiotics after his procedure given negative cultures but has spiked a temperature again with a similar constellation of symptoms arguing for a similar cause. ERCP 2/19/18: 2 biliary tree stents removed, 2 stents placed on CBD.    - Antibiotic therapy changed to Ertapenem in view that patient still spiking fever and known to be colonized with K. pneumoniae ESBL (respiratory culture from 10/6/17)

## 2018-02-22 NOTE — ASSESSMENT & PLAN NOTE
- h/o constipation, reports having regular BM's with bowel regimen.  - vomiting episode x 1 at home. Anti-emetics ordered PRN.  - KUB repeated 1/14/18 given increased abdominal pain- mild, generalized bowel distention suggesting ileus.  Diet changed to clears as tolerated for bowel rest .  - SHANE tube placed 1/16 for tube feeds. Not tolerating tube feeds, stopped 1/17/18.  - D/C shane 1/19, now in place as of 1/25.  - chronic constipation, Cont bowel regimen.   - See protein calorie malnutrition.  - improved slightly today. TF have been on hold since 2/20 for N/V.

## 2018-02-22 NOTE — ASSESSMENT & PLAN NOTE
- detected at 370 on CMV PCR 2/1.  - case discussed with ID and will hold off on treatment at this time given low WBC.   - undetected CMV PCR 2/8.  - continue Valcyte 200 mg every Mon, Wed, Fri.  - CMV PCR 2/15 negative  - Per ID, valcyte dose decreased to ppx dose on 2/18.  - CMV PCR 2/22, pending.

## 2018-02-22 NOTE — SUBJECTIVE & OBJECTIVE
Scheduled Meds:   [START ON 2/23/2018] sodium chloride 0.9%   Intravenous Once    atovaquone  1,500 mg Oral Daily    bisacodyl  10 mg Oral Daily    cycloSPORINE  175 mg Oral BID    docusate sodium  100 mg Oral BID    dronabinol  2.5 mg Oral Daily    epoetin maurisio (PROCRIT) injection  10,000 Units Intravenous Every Mon, Wed, Fri    ertapenem (INVANZ) IVPB  500 mg Intravenous Q24H    heparin (porcine)  5,000 Units Subcutaneous Q8H    custom IVPB builder   Intravenous Q24H    levetiracetam oral soln  500 mg Oral BID    levothyroxine  75 mcg Oral Before breakfast    metoclopramide HCl  5 mg Oral QID (AC & HS)    multivitamin  1 tablet Oral Daily    omeprazole  40 mg Oral QAM    polyethylene glycol  17 g Oral BID    psyllium husk (aspartame)  3.4 g Oral BID    ursodiol  300 mg Oral TID    valganciclovir 50 mg/ml  100 mg Oral Every Mon, Wed, Fri     Continuous Infusions:  PRN Meds:sodium chloride, [START ON 2/23/2018] sodium chloride 0.9%, acetaminophen, albuterol-ipratropium 2.5mg-0.5mg/3mL, bisacodyl, dextrose 50%, dextrose 50%, glucagon (human recombinant), glucose, glucose, heparin (porcine), ondansetron, ondansetron, oxyCODONE, prochlorperazine, simethicone, sodium chloride 0.9%    Review of Systems   Constitutional: Positive for activity change, appetite change, fatigue and fever. Negative for chills.   HENT: Negative.  Negative for congestion and facial swelling.    Eyes: Negative for pain, discharge and visual disturbance.   Respiratory: Negative for cough, chest tightness, shortness of breath and wheezing.    Cardiovascular: Negative for chest pain, palpitations and leg swelling.   Gastrointestinal: Positive for abdominal distention, abdominal pain and nausea. Negative for constipation and vomiting.   Endocrine: Negative.    Genitourinary: Positive for decreased urine volume. Negative for difficulty urinating.        Anuric   Musculoskeletal: Negative for arthralgias, back pain and myalgias.    Skin: Positive for color change and wound. Negative for rash.   Allergic/Immunologic: Positive for immunocompromised state.   Neurological: Negative for dizziness, seizures, weakness and headaches.   Psychiatric/Behavioral: Positive for decreased concentration and dysphoric mood. Negative for confusion. The patient is not nervous/anxious.    All other systems reviewed and are negative.    Objective:     Vital Signs (Most Recent):  Temp: 98.8 °F (37.1 °C) (02/22/18 1104)  Pulse: 85 (02/22/18 1104)  Resp: 18 (02/22/18 1104)  BP: 122/70 (02/22/18 1104)  SpO2: 96 % (02/22/18 1104) Vital Signs (24h Range):  Temp:  [98.6 °F (37 °C)-101 °F (38.3 °C)] 98.8 °F (37.1 °C)  Pulse:  [75-85] 85  Resp:  [16-20] 18  SpO2:  [95 %-97 %] 96 %  BP: (118-133)/(64-80) 122/70     Weight: 64 kg (141 lb 1.5 oz)  Body mass index is 22.77 kg/m².    Intake/Output - Last 3 Shifts       02/20 0700 - 02/21 0659 02/21 0700 - 02/22 0659 02/22 0700 - 02/23 0659    P.O. 609 130 0    Other  650     NG/GT  120 60    IV Piggyback 450 450     Total Intake(mL/kg) 1059 (17.1) 1350 (21.1) 60 (0.9)    Urine (mL/kg/hr) 0 (0) 0 (0)     Emesis/NG output  0 (0)     Other  2650 (1.7)     Stool  0 (0)     Blood  0 (0)     Total Output 0 2650      Net +1059 -1300 +60           Urine Occurrence  0 x     Stool Occurrence  1 x     Emesis Occurrence  0 x           Physical Exam   Constitutional: He is oriented to person, place, and time. He appears well-developed. No distress.   Temporal and distal extremity muscle wasting   HENT:   Head: Normocephalic and atraumatic.   Mouth/Throat: No oropharyngeal exudate.   dylan tube   Eyes: EOM are normal. Pupils are equal, round, and reactive to light. Scleral icterus is present.   Neck: Normal range of motion. Neck supple. No JVD present. No thyromegaly present.   Cardiovascular: Normal rate, regular rhythm, normal heart sounds and intact distal pulses.    No murmur heard.  Pulmonary/Chest: Effort normal. No respiratory  distress. He has decreased breath sounds in the right middle field, the right lower field and the left lower field. He has no wheezes. He exhibits no tenderness.   Diminished to RLL   Abdominal: Soft. Bowel sounds are normal. He exhibits ascites. He exhibits no distension. There is tenderness. There is no rebound and no guarding.   Dressing to chevron.  Wd vac removed 1/12/18  Dependent edema present R flank; surrounding erythema   Musculoskeletal: Normal range of motion. He exhibits edema (2+ LE edema). He exhibits no tenderness.   Neurological: He is alert and oriented to person, place, and time. He has normal reflexes.   Skin: Skin is warm and dry. Capillary refill takes 2 to 3 seconds. He is not diaphoretic. No erythema.   jaundice   Psychiatric: He has a normal mood and affect. His behavior is normal. Judgment and thought content normal. His mood appears not anxious.   Depressed mood   Nursing note and vitals reviewed.      Laboratory:  Immunosuppressants         Stop Route Frequency     cycloSPORINE (NEORAL) 100 mg/mL microemulsion solution 175 mg      -- Oral 2 times daily        CBC:   Recent Labs  Lab 02/22/18  0547   WBC 2.35*   RBC 2.34*   HGB 7.4*   HCT 22.5*   *   MCV 96   MCH 31.6*   MCHC 32.9     CMP:   Recent Labs  Lab 02/22/18  0547   GLU 71   CALCIUM 8.4*   ALBUMIN 1.8*   PROT 4.9*      K 3.7   CO2 23      BUN 10   CREATININE 3.4*   ALKPHOS 88   ALT <5*   AST 21   BILITOT 2.1*     Labs within the past 24 hours have been reviewed.    Diagnostic Results:  pertinent imaging reviewed

## 2018-02-22 NOTE — SUBJECTIVE & OBJECTIVE
Interval History: had fever of 101 F, otherwise no changes in clinical pictures    Review of Systems   Constitutional: Negative for chills, fatigue and fever.   HENT: Negative for sore throat.    Respiratory: Negative for cough, chest tightness and shortness of breath.    Cardiovascular: Negative for chest pain, palpitations and leg swelling.   Gastrointestinal: Positive for abdominal pain. Negative for abdominal distention, diarrhea, nausea and vomiting.   Genitourinary: Negative for dysuria, flank pain and urgency.   Skin: Negative for rash.   Neurological: Negative for dizziness, light-headedness and numbness.   Psychiatric/Behavioral: Negative for confusion.     Objective:     Vital Signs (Most Recent):  Temp: 98.6 °F (37 °C) (02/22/18 0343)  Pulse: 80 (02/22/18 0343)  Resp: 18 (02/22/18 0343)  BP: 130/80 (02/22/18 0343)  SpO2: 96 % (02/22/18 0343) Vital Signs (24h Range):  Temp:  [98.6 °F (37 °C)-101 °F (38.3 °C)] 98.6 °F (37 °C)  Pulse:  [73-93] 80  Resp:  [16-20] 18  SpO2:  [95 %-97 %] 96 %  BP: (118-144)/(64-85) 130/80     Weight: 64 kg (141 lb 1.5 oz)  Body mass index is 22.77 kg/m².    Estimated Creatinine Clearance: 29.2 mL/min (A) (based on SCr of 3.4 mg/dL (H)).    Physical Exam   Constitutional: He is oriented to person, place, and time.   HENT:   Mouth/Throat: No oropharyngeal exudate.   Eyes: No scleral icterus.   Cardiovascular: Normal rate and regular rhythm.    Pulmonary/Chest: Effort normal and breath sounds normal. No respiratory distress. He has no wheezes. He has no rales.   Abdominal: Soft. There is tenderness. There is no rebound.   Musculoskeletal: He exhibits edema.   Lymphadenopathy:     He has no cervical adenopathy.   Neurological: He is alert and oriented to person, place, and time.   Skin: He is not diaphoretic.       Significant Labs:   Bilirubin:     Recent Labs  Lab 02/18/18  0505 02/19/18  0528 02/20/18  0412 02/21/18  0437 02/22/18  0547   BILITOT 2.3* 2.1* 1.8* 2.0* 2.1*      Blood Culture:     Recent Labs  Lab 01/30/18  1916 02/11/18  1236 02/11/18  1241 02/15/18  1729 02/21/18  0001   LABBLOO No growth after 5 days.  No growth after 5 days. No growth after 5 days. No growth after 5 days. No growth after 5 days.  No growth after 5 days. No Growth to date  No Growth to date  No Growth to date  No Growth to date     BMP:     Recent Labs  Lab 02/22/18  0547   GLU 71      K 3.7      CO2 23   BUN 10   CREATININE 3.4*   CALCIUM 8.4*   MG 1.7     CBC:     Recent Labs  Lab 02/21/18  0437 02/22/18  0547   WBC 2.72* 2.35*   HGB 7.4* 7.4*   HCT 23.0* 22.5*   * 141*     CMP:     Recent Labs  Lab 02/21/18  0437 02/22/18  0547    139   K 3.6 3.7    106   CO2 24 23   GLU 78 71   BUN 15 10   CREATININE 4.5* 3.4*   CALCIUM 8.9 8.4*   PROT 5.0* 4.9*   ALBUMIN 1.9* 1.8*   BILITOT 2.0* 2.1*   ALKPHOS 92 88   AST 22 21   ALT <5* <5*   ANIONGAP 11 10   EGFRNONAA 16.5* 23.2*     Microbiology Results (last 7 days)     Procedure Component Value Units Date/Time    Fungus culture [[id number]] Collected:  02/07/18 1452    Order Status:  Completed Specimen:  Body Fluid from Peritoneal Fluid Updated:  02/22/18 0810     Fungus (Mycology) Culture Culture in progress     Fungus (Mycology) Culture No fungus isolated after 2 weeks    Fungus culture [[id number]] Collected:  02/16/18 1411    Order Status:  Completed Specimen:  Body Fluid from Pleural Fluid Updated:  02/22/18 0717     Fungus (Mycology) Culture Culture in progress    Narrative:       Right    Blood culture [[id number]] Collected:  02/21/18 0001    Order Status:  Completed Specimen:  Blood Updated:  02/22/18 0612     Blood Culture, Routine No Growth to date     Blood Culture, Routine No Growth to date    Blood culture [[id number]] Collected:  02/21/18 0001    Order Status:  Completed Specimen:  Blood Updated:  02/22/18 0612     Blood Culture, Routine No Growth to date     Blood Culture, Routine No Growth to date    Blood  culture [[id number]] Collected:  02/15/18 1729    Order Status:  Completed Specimen:  Blood Updated:  02/20/18 2012     Blood Culture, Routine No growth after 5 days.    Narrative:       Collection has been rescheduled by SAN1 at 2/15/2018 16:57 Reason:   Patient getting procedure; doctor asked for lab to return later.  Collection has been rescheduled by SAN1 at 2/15/2018 16:57 Reason:   Patient getting procedure; doctor asked for lab to return later.    Blood culture [[id number]] Collected:  02/15/18 1729    Order Status:  Completed Specimen:  Blood Updated:  02/20/18 2012     Blood Culture, Routine No growth after 5 days.    Narrative:       Collection has been rescheduled by SAN1 at 2/15/2018 16:57 Reason:   Patient getting procedure; doctor asked for lab to return later.  Collection has been rescheduled by SAN1 at 2/15/2018 16:57 Reason:   Patient getting procedure; doctor asked for lab to return later.    Fungus culture [[id number]] Collected:  01/19/18 1101    Order Status:  Completed Specimen:  Abdominal from Ascites Updated:  02/20/18 1357     Fungus (Mycology) Culture No fungus isolated after 4 weeks    Fungus culture [[id number]] Collected:  01/19/18 1140    Order Status:  Completed Specimen:  Pleural Fluid from Pleural Fluid Updated:  02/20/18 1357     Fungus (Mycology) Culture No fungus isolated after 4 weeks    Culture, Anaerobic [[id number]] Collected:  02/16/18 1411    Order Status:  Completed Specimen:  Body Fluid from Pleural Fluid Updated:  02/20/18 0918     Anaerobic Culture Culture in progress    Culture, Body Fluid (Aerobic) w/ GS [[id number]] Collected:  02/16/18 1411    Order Status:  Completed Specimen:  Body Fluid from Pleural Fluid Updated:  02/19/18 1053     AEROBIC CULTURE - FLUID No growth     Gram Stain Result Rare WBC's      No organisms seen    Blood culture [[id number]] Collected:  02/11/18 1241    Order Status:  Completed Specimen:  Blood Updated:  02/16/18 1412     Blood Culture, Routine  No growth after 5 days.    Narrative:       Collection has been rescheduled by TRM at 2/11/2018 11:34 Reason: due   now  Collection has been rescheduled by TRM at 2/11/2018 11:34 Reason: due   now    Blood culture [[id number]] Collected:  02/11/18 1236    Order Status:  Completed Specimen:  Blood Updated:  02/16/18 1412     Blood Culture, Routine No growth after 5 days.    Narrative:       Collection has been rescheduled by TRM at 2/11/2018 11:34 Reason: due   now  Collection has been rescheduled by TRM at 2/11/2018 11:34 Reason: due   now    Culture, Body Fluid (Aerobic) w/ GS [[id number]]     Order Status:  Canceled Specimen:  Body Fluid from Pleural Fluid     Culture, Anaerobic [[id number]]     Order Status:  Canceled Specimen:  Body Fluid from Pleural Fluid     Fungus culture [[id number]]     Order Status:  Canceled Specimen:  Body Fluid from Pleural Fluid     Blood culture [[id number]]     Order Status:  Canceled Specimen:  Blood     Blood culture [[id number]]     Order Status:  Canceled Specimen:  Blood           Significant Imaging: I have reviewed all pertinent imaging results/findings within the past 24 hours.

## 2018-02-22 NOTE — ASSESSMENT & PLAN NOTE
- Started on vanc/cefepime for ERCP. Now transitioned to vanc/zosyn.  - Blood cultures sent, prelim NGTD. Repeat blood cultures and chest xray today 2/10.  - thoracentesis 2/1 - fluid negative for infection.  - atelectasis noted on ct scan - start breathing tx & CPT.  - now cmv positive - started treatment 2/5/18.  - taken to OR 2/7 for exp lap - fluid collections drained, cultures pending.  - Patient with low grade fever 2/15, 100.3.  -CT A/P 2/15 with no evidence of IA fluid infection. Did show R pleural effusion s/p thoracentesis, cell count negative for infection, cx pending.  -Repeat blood cx 2/15 prelim NGTD, CMV PCR 2/15 negative   Complaining of abdominal pain. Is tender in RUQ.  -Remains with fevers  - Bilirubin remains elevated. Concerning for cholangitis. Antibiotics broadened 2/17. ID re consulted.   - ERCP  2/19, overall unremarkable findings. Did place new larger stents. Will need repeat ERCP in 10 weeks. Bilirubin is stable post ERCP.  -Tmax 101 overnight again ID following. Plan to transition vanc/zosyn to ertapenem as no improvement noted. Cont to monitor.   - will obtain tagged WBC scan Monday per ID recs.

## 2018-02-22 NOTE — PROGRESS NOTES
[facility]  Liver Transplant  Progress Note    Patient Name: Jhonny Diana  MRN: [id number]  Admission Date: 2018  Hospital Length of Stay: 42 days  Code Status: Full Code  Primary Care Provider: Primary Doctor No  Post-Operative Day: 126    ORGAN:   LIVER  Disease Etiology: Acute Alcoholic Hepatitis  Donor Type:    - Brain Death  CDC High Risk:   No  Donor CMV Status:   Donor CMV Status: Positive  Donor HBcAB:   Negative  Donor HCV Status:   Negative  Whole or Partial: Whole Liver  Biliary Anastomosis: End to End  Arterial Anatomy: Standard  Subjective:     History of Present Illness:  Jhonny Diana is a 26 y/o male with past medical history of alcoholic cirrhosis.  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 Found to have peritonitis in November (WBC 5000, 75% PNM) neg for bile leak. He was treated initially with vanc/cefepime. Repeat cell counts  with some improvement (WBC 1400, 45% PNM). He has undergone multiple paracenteses as well as abscess drainage of perihepatic fluid collections and treated with antimicrobial therapy but no positive cultures. Of note, biliary stricture also identified and ERCP performed on 2017 with sphincterotomy and biliary stent placed. Liver tests still have not normalized despite intervention, bilirubin and AP remain elevated. Other pertinent PMH current wound vac in place 2/2 wound infection, malnutrition requiring TPN for short course and ongoing hypoalbuminemia, and seizure activity while on prograf and has since been switched to cyclosporine without reoccurrence.  He presented to the ER for fever, abdominal pain, and N/V. He reports fever (103) for 1  day prior. Overnight, he developed N/V, reports small amount of green emesis with new left sided pain. He also endorses worsening SOB with exertion. He was scheduled as an outpatient for follow up paracentesis and IR drainage of fluid collection. CXR in ER shows large pleural effusion with subsegmental atelectasis. Infectious work up initiated in ER. His ANC is 900. Broad spectrum antibiotics initiated in ED. He denies chest pain, palpitations, diarrhea, constipation, or back pain. Denies any sick contacts.    Hospital Course:  Thoracentesis (1.2L off), Paracentesis (1.6L off), and IR drainage of fluid collection 1/11, all fluids negative for infection. ID consulted. Broad spectrum antibiotics d/c'd 1/15. Chronically malnourished with poor PO intake, prealbumin 7. Shane tube placed 1/15 for tube feedings.     ERCP 1/17 with sludge and a biliary stone which was removed and stent exchanged.  Remained with n/v; therefore, TF remained on hold and TPN continued.   Pt with fever s/p ERCP on 1/17 which continued until 1/19.  Vanc/cefepime restarted.  Blood cx 1/17 and 1/18 NGTD.  ID reconsulted.  Fungal markers sent.  CT C/A/P obtained.  With large R pleural effusion and ascites- both drained 1/19 and negative for infx per cell count. Pt afebrile 1/20. Liver biopsy 1/23 - without rejection.  EGD 1/25 - unremarkable for source of GI symptoms.  Resume diet along with other GI recommendations. SHANE tube placed for tube feeds.       Interval History: Pt again spiked temp of 101 overnight, now down to 99. Resent Bcx overnight, prelim NGTD. Transitioned to Erta from vanc/zosyn per ID. Planning for tagged WBC scan on Monday. R sided abdominal erythema/dependent edema improving today.  TF have been on hold since 2/20. Will continue to hold today per pt request. Pt working on diet. KUB yesterday with significant stool and gas. Declines colace, miralax, metamucil, but willing to take daily bisacodyl. Pt agreeable to suppository today.  If suppository not successful can give mag citrate through dylan tonight. Will also retrial Marinol tonight as pt reported improvement in appetite previously. Will monitor closely for sedation.     Scheduled Meds:   [START ON 2/23/2018] sodium chloride 0.9%   Intravenous Once    atovaquone  1,500 mg Oral Daily    bisacodyl  10 mg Oral Daily    cycloSPORINE  175 mg Oral BID    docusate sodium  100 mg Oral BID    dronabinol  2.5 mg Oral Daily    epoetin maurisio (PROCRIT) injection  10,000 Units Intravenous Every Mon, Wed, Fri    ertapenem (INVANZ) IVPB  500 mg Intravenous Q24H    heparin (porcine)  5,000 Units Subcutaneous Q8H    custom IVPB builder   Intravenous Q24H    levetiracetam oral soln  500 mg Oral BID    levothyroxine  75 mcg Oral Before breakfast    metoclopramide HCl  5 mg Oral QID (AC & HS)    multivitamin  1 tablet Oral Daily    omeprazole  40 mg Oral QAM    polyethylene glycol  17 g Oral BID    psyllium husk (aspartame)  3.4 g Oral BID    ursodiol  300 mg Oral TID    valganciclovir 50 mg/ml  100 mg Oral Every Mon, Wed, Fri     Continuous Infusions:  PRN Meds:sodium chloride, [START ON 2/23/2018] sodium chloride 0.9%, acetaminophen, albuterol-ipratropium 2.5mg-0.5mg/3mL, bisacodyl, dextrose 50%, dextrose 50%, glucagon (human recombinant), glucose, glucose, heparin (porcine), ondansetron, ondansetron, oxyCODONE, prochlorperazine, simethicone, sodium chloride 0.9%    Review of Systems   Constitutional: Positive for activity change, appetite change, fatigue and fever. Negative for chills.   HENT: Negative.  Negative for congestion and facial swelling.    Eyes: Negative for pain, discharge and visual disturbance.   Respiratory: Negative for cough, chest tightness, shortness of breath and wheezing.    Cardiovascular: Negative for chest pain, palpitations and leg swelling.   Gastrointestinal: Positive for abdominal distention, abdominal pain and nausea. Negative for constipation and vomiting.    Endocrine: Negative.    Genitourinary: Positive for decreased urine volume. Negative for difficulty urinating.        Anuric   Musculoskeletal: Negative for arthralgias, back pain and myalgias.   Skin: Positive for color change and wound. Negative for rash.   Allergic/Immunologic: Positive for immunocompromised state.   Neurological: Negative for dizziness, seizures, weakness and headaches.   Psychiatric/Behavioral: Positive for decreased concentration and dysphoric mood. Negative for confusion. The patient is not nervous/anxious.    All other systems reviewed and are negative.    Objective:     Vital Signs (Most Recent):  Temp: 98.8 °F (37.1 °C) (02/22/18 1104)  Pulse: 85 (02/22/18 1104)  Resp: 18 (02/22/18 1104)  BP: 122/70 (02/22/18 1104)  SpO2: 96 % (02/22/18 1104) Vital Signs (24h Range):  Temp:  [98.6 °F (37 °C)-101 °F (38.3 °C)] 98.8 °F (37.1 °C)  Pulse:  [75-85] 85  Resp:  [16-20] 18  SpO2:  [95 %-97 %] 96 %  BP: (118-133)/(64-80) 122/70     Weight: 64 kg (141 lb 1.5 oz)  Body mass index is 22.77 kg/m².    Intake/Output - Last 3 Shifts       02/20 0700 - 02/21 0659 02/21 0700 - 02/22 0659 02/22 0700 - 02/23 0659    P.O. 609 130 0    Other  650     NG/GT  120 60    IV Piggyback 450 450     Total Intake(mL/kg) 1059 (17.1) 1350 (21.1) 60 (0.9)    Urine (mL/kg/hr) 0 (0) 0 (0)     Emesis/NG output  0 (0)     Other  2650 (1.7)     Stool  0 (0)     Blood  0 (0)     Total Output 0 2650      Net +1059 -1300 +60           Urine Occurrence  0 x     Stool Occurrence  1 x     Emesis Occurrence  0 x           Physical Exam   Constitutional: He is oriented to person, place, and time. He appears well-developed. No distress.   Temporal and distal extremity muscle wasting   HENT:   Head: Normocephalic and atraumatic.   Mouth/Throat: No oropharyngeal exudate.   dylan tube   Eyes: EOM are normal. Pupils are equal, round, and reactive to light. Scleral icterus is present.   Neck: Normal range of motion. Neck supple. No JVD  present. No thyromegaly present.   Cardiovascular: Normal rate, regular rhythm, normal heart sounds and intact distal pulses.    No murmur heard.  Pulmonary/Chest: Effort normal. No respiratory distress. He has decreased breath sounds in the right middle field, the right lower field and the left lower field. He has no wheezes. He exhibits no tenderness.   Diminished to RLL   Abdominal: Soft. Bowel sounds are normal. He exhibits ascites. He exhibits no distension. There is tenderness. There is no rebound and no guarding.   Dressing to chevron.  Wd vac removed 1/12/18  Dependent edema present R flank; surrounding erythema   Musculoskeletal: Normal range of motion. He exhibits edema (2+ LE edema). He exhibits no tenderness.   Neurological: He is alert and oriented to person, place, and time. He has normal reflexes.   Skin: Skin is warm and dry. Capillary refill takes 2 to 3 seconds. He is not diaphoretic. No erythema.   jaundice   Psychiatric: He has a normal mood and affect. His behavior is normal. Judgment and thought content normal. His mood appears not anxious.   Depressed mood   Nursing note and vitals reviewed.      Laboratory:  Immunosuppressants         Stop Route Frequency     cycloSPORINE (NEORAL) 100 mg/mL microemulsion solution 175 mg      -- Oral 2 times daily        CBC:   Recent Labs  Lab 02/22/18  0547   WBC 2.35*   RBC 2.34*   HGB 7.4*   HCT 22.5*   *   MCV 96   MCH 31.6*   MCHC 32.9     CMP:   Recent Labs  Lab 02/22/18  0547   GLU 71   CALCIUM 8.4*   ALBUMIN 1.8*   PROT 4.9*      K 3.7   CO2 23      BUN 10   CREATININE 3.4*   ALKPHOS 88   ALT <5*   AST 21   BILITOT 2.1*     Labs within the past 24 hours have been reviewed.    Diagnostic Results:  pertinent imaging reviewed    Assessment/Plan:     Fever    - Started on vanc/cefepime for ERCP. Now transitioned to vanc/zosyn.  - Blood cultures sent, prelim NGTD. Repeat blood cultures and chest xray today 2/10.  - thoracentesis 2/1 -  fluid negative for infection.  - atelectasis noted on ct scan - start breathing tx & CPT.  - now cmv positive - started treatment 2/5/18.  - taken to OR 2/7 for exp lap - fluid collections drained, cultures pending.  - Patient with low grade fever 2/15, 100.3.  -CT A/P 2/15 with no evidence of IA fluid infection. Did show R pleural effusion s/p thoracentesis, cell count negative for infection, cx pending.  -Repeat blood cx 2/15 prelim NGTD, CMV PCR 2/15 negative   Complaining of abdominal pain. Is tender in RUQ.  -Remains with fevers  - Bilirubin remains elevated. Concerning for cholangitis. Antibiotics broadened 2/17. ID re consulted.   - ERCP  2/19, overall unremarkable findings. Did place new larger stents. Will need repeat ERCP in 10 weeks. Bilirubin is stable post ERCP.  -Tmax 101 overnight again ID following. Plan to transition vanc/zosyn to ertapenem as no improvement noted. Cont to monitor.   - will obtain tagged WBC scan Monday per ID recs.             Biliary stricture of transplanted liver    - tbili with increase prompting ERCP 1/30 with stones and sludge, stents placed.  -  Repeat ERCP 2/19 as was having recurrent fevers with bilirubin stuck at 2.0-2.2 with overall unremarkable findings seen. Did place new larger stents. Will need repeat ERCP in 10 weeks.   -Bilirubin at 2.1 today        Nausea and vomiting    - h/o constipation, reports having regular BM's with bowel regimen.  - vomiting episode x 1 at home. Anti-emetics ordered PRN.  - KUB repeated 1/14/18 given increased abdominal pain- mild, generalized bowel distention suggesting ileus.  Diet changed to clears as tolerated for bowel rest .  - SHANE tube placed 1/16 for tube feeds. Not tolerating tube feeds, stopped 1/17/18.  - D/C shane 1/19, now in place as of 1/25.  - chronic constipation, Cont bowel regimen.   - See protein calorie malnutrition.  - improved slightly today. TF have been on hold since 2/20 for N/V.           Severe protein-calorie  malnutrition    - Poor PO intake since transplant requiring short course of TPN during previous hospital stay.   - albumin remains decreased but appetite slowly improving per pt.   - Dietary consulted. Will need to closely monitor PO intake.   - supplements also ordered.   - SHANE with tube feeding start 1/16. Stopped 1/17/18, was not tolerating. Shane removed 1/20.  - prealbumin 7 on 1/15.  - TPN started 1/16/18.   - Pt able to eat minimally 1/21 and 1/22 which is an improvement.  - gi consulted - EGD reviewed, increased ppi bid, miralax bid, and metamucil bid, decreased narcotics.  - continue reglan, placed SHANE tube with tube feeds - transitioned to nightly feeds. Not tolerating well due to nausea.   - small bowel follow through with normal findings.   - consulted gi for possible gj tube placement, holding off on this for now as with ongoing fevers.  -Dietary changed tube feeds back to 24 hours from nightly 2/19 as not eating enough during the day to meet caloric needs. TF on hold since 2/20.  - retrial marinol 2/22 as pt reports that's the only thing helping his appetite, in the evening 2/2 concern for possible drowsiness          Constipation    - Enema ordered 1/20 and 1/21 with BM.  - Encourage ambulation.  - decrease narcotics.  - d/c metamucil and miralax bid as makes patient nauseated.  - increase reglan qid.  - small bowel follow through with normal findings.   - patient reports last BM 2/19   -schedule bisacodyl  -KUB 2/21 with significant amounts of stool and gas. Pt declines miralax, metamucil, colace.   -encouraged suppository. If unsuccessful will attempt to give mag citrate through his SHANE tube.        Liver transplanted    - Post op course complicated by fevers and hyperbilirubinemia.  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  - AST/ALT normal. Tbili/alkphos remain elevated.  - Liver US 1/8 showed 3.9 cm complex fluid collection anterior to right lobe and moderate nonspecific  complex ascites inferior to transplant.   - IR placed drain 1/11/18, cell count negative for infection.  - PBS consulted. PBS not comfortable proceeding with EUS with liver biopsy given fever and possible infectious process going on. ERCP 1/17 with stone and sludge. Pt placed on Actigall - treated with 10 day course of abx.  ID now signed off.  - Bili elevated - liver u/s 1/28  - Liver biopsy 1/23 without rejection.  - consulted PBS for ERCP - Choledocholithiasis was found along with sludge, stents placed 1/30.  - ERCP 2/19, overall unremarkable findings. Did place new larger stents. Will need repeat ERCP in 10 weeks. Bilirubin is improving though post ERCP.        Long-term use of immunosuppressant medication    - Maintenance IS with cyclosporine. MMF on hold for infections and neutropenia. Continue to check cyclosporine level daily. Assess for toxicity and adjust level as needed.        At risk for opportunistic infections    - CMV detected 2/1 now on tx dose valcyte. CMV PCR 2/15 not detected. CMV PCR 2/22, pending.   - bactrim held.            Prophylactic immunotherapy    - See long term use of immunosuppression.         Delayed surgical wound healing    - wd vac removed 1/12/18.  Wound healing well. Aquacel AG and Mepilex border dressing applied via wound care recs.           Anemia of chronic disease    - H/H stable. Cont to monitor with daily cbc.         Other cytomegaloviral diseases    - detected at 370 on CMV PCR 2/1.  - case discussed with ID and will hold off on treatment at this time given low WBC.   - undetected CMV PCR 2/8.  - continue Valcyte 200 mg every Mon, Wed, Fri.  - CMV PCR 2/15 negative  - Per ID, valcyte dose decreased to ppx dose on 2/18.  - CMV PCR 2/22, pending.         Other ascites    - Paracentesis performed 1/11/18 with 1600 ml removed.    - fluid negative for infection.   - Repeat para 1/19 negative for infection per cell count.  - no fluid seen for paracentesis on 1/23.         Recurrent pleural effusion on right    - CXR in ER with large right pleural effusion with subjective c/o worsening SOB  - thoracentesis completed- 1200 ml removed.  SOB with significant improvement.   - Cell count reviewed and negative for infection.   - resp even and non labored.  O2 sat 94-98%.  - Repeat thora 1/19 negative for infection per cell count.  - Reaccumulating fluid on xray 1/21.  - thoracentesis 1/31 - 1.4L removed, negative for infection  - Reaccumulating fluid again on xray 2/10.   -Thoracentesis performed  2/16 with 1500 cc off, cell count neg for infection, cx no growth to date.         Acute renal failure with tubular necrosis    - HD resumed on previous admission. Now anuric and dialyzes M-W-F.  - Nephrology following.    - ktm consulted for possible kidney transplant.            VTE Risk Mitigation         Ordered     heparin (porcine) injection 1,000 Units  As needed (PRN)     Route:  Intra-Catheter        02/05/18 0945     heparin (porcine) injection 5,000 Units  Every 8 hours     Route:  Subcutaneous        01/31/18 0957     Medium Risk of VTE  Once      01/11/18 0351     Place sequential compression device  Until discontinued      01/11/18 0351          The patients clinical status was discussed at multidisplinary rounds, involving transplant surgery, transplant medicine, pharmacy, nursing, nutrition, and social work    Discharge Planning: Not a candidate for d/c at this time.       Fransisca Thomas PA-C  Liver Transplant  [facility]

## 2018-02-22 NOTE — ASSESSMENT & PLAN NOTE
- CMV detected 2/1 now on tx dose valcyte. CMV PCR 2/15 not detected. CMV PCR 2/22, pending.   - bactrim held.

## 2018-02-22 NOTE — TELEPHONE ENCOUNTER
Transplant SW faxed [phone] form for chronic HD indications to EZEKIEL Lowry at [facility] to complete and enter into the [facility] system.   EZEKIEL including documentation from Dr. Patton and lab values.   SW remains available for all transplant resources, education and psychosocial support.   Pt and Dr. Patton had previously signed the form last week.

## 2018-02-22 NOTE — ASSESSMENT & PLAN NOTE
- Poor PO intake since transplant requiring short course of TPN during previous hospital stay.   - albumin remains decreased but appetite slowly improving per pt.   - Dietary consulted. Will need to closely monitor PO intake.   - supplements also ordered.   - SHANE with tube feeding start 1/16. Stopped 1/17/18, was not tolerating. Shane removed 1/20.  - prealbumin 7 on 1/15.  - TPN started 1/16/18.   - Pt able to eat minimally 1/21 and 1/22 which is an improvement.  - gi consulted - EGD reviewed, increased ppi bid, miralax bid, and metamucil bid, decreased narcotics.  - continue reglan, placed SHANE tube with tube feeds - transitioned to nightly feeds. Not tolerating well due to nausea.   - small bowel follow through with normal findings.   - consulted gi for possible gj tube placement, holding off on this for now as with ongoing fevers.  -Dietary changed tube feeds back to 24 hours from nightly 2/19 as not eating enough during the day to meet caloric needs. TF on hold since 2/20.  - retrial marinol 2/22 as pt reports that's the only thing helping his appetite, in the evening 2/2 concern for possible drowsiness

## 2018-02-22 NOTE — PROGRESS NOTES
[facility]  Adult Nutrition  Progress Note    SUMMARY     Recommendations    Recommendation/Intervention:    Hoping marinol will work well and PO intake will improve. Continue current TF order of Peptamen 1.5 Prebio at goal rate of 60mL/hr until PO intake meets 50% of all meals.    If PO intake does not improve within 24 hours and patient continues to refuse TF - recommend restarting TPN.     Strongly encouraged patient to eat or allow tube feed administration.     RD following.     Goals: Consume/tolerate > 85% EEN and EPN  Nutrition Goal Status: goal not met  Communication of RD Recs: reviewed with physician    Reason for Assessment    Reason for Assessment: RD follow-up  Diagnosis: transplant/postoperative complications  Relevent Medical History: OLTx 10/19/2017, complicate post-op w/ LAURA ongoing, chronic malnutrition, cirrhosis 2' EtOH   Interdisciplinary Rounds: attended     General Information Comments: TF off since 2/20 at 6pm, pt refusing to have TF replaced due to nausea, no PO intake in many days; marinol to be restarted today, patient is aware of importance of nutrition, pt with lots of stool in SI - suppository to be given    Nutrition Discharge Planning: Adequate nutrition PO vs EN    Nutrition Prescription Ordered    Current Diet Order: Regular  Nutrition Order Comments: with continuous TF  Current Nutrition Support Formula Ordered: Peptamen 1.5 Prebio  Current Nutrition Support Rate Ordered: 60 (ml)  Current Nutrition Support Frequency Ordered: mL/hr  Oral Nutrition Supplement: Boost Breeze     Evaluation of Received Nutrients/Fluid Intake    Enteral Calories (kcal): 0  Enteral Protein (gm): 0  Enteral (Free Water) Fluid (mL): 0     Parenteral Calories (kcal): 0  Parenteral Protein (gm): 0  Parenteral Fluid (mL): 0      Total Calories (kcal): 0     Energy Calories Required: not meeting needs  % Kcal Needs: 0      Protein Required: not meeting needs  % Protein Needs: 0      GIR  "(Glucose Infusion Rate) (mg/kg/min): 0 mg/kg/min  Lipid Calories (kcals): 0 kcals  I/O: +35mL since admit       Fluid Required:  (per MD)  Comments: LBM   Tolerance: not tolerating  % Intake of Estimated Energy Needs: 0 - 25 %  % Meal Intake: 0%     Nutrition Risk Screen     Nutrition Risk Screen: large or nonhealing wound, burn or pressure ulcer    Nutrition/Diet History    Patient Reported Diet/Restrictions/Preferences: low salt  Typical Food/Fluid Intake: eats very little at this time  Food Preferences: No cultural or Shinto food preferences noted  Meal/Snack Patterns: pt likes to cook, when feeling well will cook and then only eat bites  Supplemental Drinks or Food Habits:  (protein powders sometimes)  Factors Affecting Nutritional Intake: altered gastrointestinal function, abdominal distention, decreased appetite, nausea/vomiting      Labs/Tests/Procedures/Meds     Pertinent Labs Reviewed: reviewed, pertinent  Pertinent Labs Comments: Cr 3.4, GFR 26.8, P 2.4, TBili 2.1  Pertinent Medications Reviewed: reviewed, pertinent  Pertinent Medications Comments: docusate, bisacodyl, epoetin, heparin, MVI    Physical Findings    Overall Physical Appearance: generalized wasting, loss of muscle mass, loss of subcutaneous fat, weak  Tubes: nasoduoduenal tube  Oral/Mouth Cavity: WDL  Skin: intact    Anthropometrics    Temp: 98.8 °F (37.1 °C)     Height: 5' 6" (167.6 cm)  Weight Method: Bed Scale  Weight: 64 kg (141 lb 1.5 oz)  Ideal Body Weight (IBW), Male: 142 lb     % Ideal Body Weight, Male (lb): 112.68 lb     BMI (Calculated): 25.9  BMI Grade: 25 - 29.9 - overweight  Weight Loss: unintentional  Usual Body Weight (UBW), k kg (2017 )     % Usual Body Weight: 86.71  % Weight Change From Usual Weight: -13.47 %        Estimated/Assessed Needs    Weight Used For Calorie Calculations: 62.3 kg (137 lb 5.6 oz)   Height (cm): 167.6 cm  Energy Calorie Requirements (kcal): 3618-8964 (30-35 kcal/kg)  Energy Need " Method: Kcal/kg        RMR (Belknap-St. Jeor Equation): 1535.75      Weight Used For Protein Calculations: 62.3 kg (137 lb 5.6 oz)  Protein Requirements: 81-94g (1.3-1.5 g/kg)  Fluid Requirements (mL): 1mL/kcal  Fluid Need Method: RDA Method        RDA Method (mL): 1869     Assessment and Plan    Severe protein-calorie malnutrition    Nutrition Diagnosis  Inadequate oral intake    Related to (etiology):   Decreased appetite, abdominal distention    Signs and Symptoms (as evidenced by):   Pt eating 0% of meals and relying on enteral nutrition for 100% of EPN, EEN     Interventions/Recommendations (treatment strategy):  See recs    Nutrition Diagnosis Status:   Continues            Monitor and Evaluation    Food and Nutrient Intake: energy intake, food and beverage intake  Food and Nutrient Adminstration: diet order  Knowledge/Beliefs/Attitudes: food and nutrition knowledge/skill  Physical Activity and Function: nutrition-related ADLs and IADLs  Anthropometric Measurements: weight, weight change, body mass index  Biochemical Data, Medical Tests and Procedures: electrolyte and renal panel, lipid profile, gastrointestinal profile, glucose/endocrine profile, inflammatory profile  Nutrition-Focused Physical Findings: overall appearance    Nutrition Risk    Level of Risk:  (2x/week)    Nutrition Follow-Up    RD Follow-up?: Yes

## 2018-02-22 NOTE — ASSESSMENT & PLAN NOTE
- tbili with increase prompting ERCP 1/30 with stones and sludge, stents placed.  -  Repeat ERCP 2/19 as was having recurrent fevers with bilirubin stuck at 2.0-2.2 with overall unremarkable findings seen. Did place new larger stents. Will need repeat ERCP in 10 weeks.   -Bilirubin at 2.1 today

## 2018-02-23 LAB
ALBUMIN SERPL BCP-MCNC: 1.7 G/DL
ALP SERPL-CCNC: 86 U/L
ALT SERPL W/O P-5'-P-CCNC: <5 U/L
ANION GAP SERPL CALC-SCNC: 11 MMOL/L
ANISOCYTOSIS BLD QL SMEAR: SLIGHT
AST SERPL-CCNC: 20 U/L
BACTERIA SPEC ANAEROBE CULT: NORMAL
BASOPHILS # BLD AUTO: ABNORMAL K/UL
BASOPHILS NFR BLD: 1 %
BILIRUB SERPL-MCNC: 2.1 MG/DL
BUN SERPL-MCNC: 15 MG/DL
CALCIUM SERPL-MCNC: 8.2 MG/DL
CHLORIDE SERPL-SCNC: 104 MMOL/L
CMV DNA SERPL NAA+PROBE-ACNC: NORMAL IU/ML
CO2 SERPL-SCNC: 22 MMOL/L
CREAT SERPL-MCNC: 4.6 MG/DL
CYCLOSPORINE BLD LC/MS/MS-MCNC: 293 NG/ML
DIFFERENTIAL METHOD: ABNORMAL
EOSINOPHIL # BLD AUTO: ABNORMAL K/UL
EOSINOPHIL NFR BLD: 2 %
ERYTHROCYTE [DISTWIDTH] IN BLOOD BY AUTOMATED COUNT: 16.3 %
EST. GFR  (AFRICAN AMERICAN): 18.6 ML/MIN/1.73 M^2
EST. GFR  (NON AFRICAN AMERICAN): 16.1 ML/MIN/1.73 M^2
GLUCOSE SERPL-MCNC: 64 MG/DL
HCT VFR BLD AUTO: 24.3 %
HGB BLD-MCNC: 7.8 G/DL
IMM GRANULOCYTES # BLD AUTO: ABNORMAL K/UL
IMM GRANULOCYTES NFR BLD AUTO: ABNORMAL %
LYMPHOCYTES # BLD AUTO: ABNORMAL K/UL
LYMPHOCYTES NFR BLD: 41 %
MAGNESIUM SERPL-MCNC: 1.9 MG/DL
MCH RBC QN AUTO: 31.1 PG
MCHC RBC AUTO-ENTMCNC: 32.1 G/DL
MCV RBC AUTO: 97 FL
MONOCYTES # BLD AUTO: ABNORMAL K/UL
MONOCYTES NFR BLD: 10 %
NEUTROPHILS NFR BLD: 46 %
NRBC BLD-RTO: 0 /100 WBC
PHOSPHATE SERPL-MCNC: 3.5 MG/DL
PLATELET # BLD AUTO: 152 K/UL
PLATELET BLD QL SMEAR: ABNORMAL
PMV BLD AUTO: 11 FL
POLYCHROMASIA BLD QL SMEAR: ABNORMAL
POTASSIUM SERPL-SCNC: 4 MMOL/L
PROT SERPL-MCNC: 5 G/DL
RBC # BLD AUTO: 2.51 M/UL
SODIUM SERPL-SCNC: 137 MMOL/L
WBC # BLD AUTO: 2.53 K/UL

## 2018-02-23 PROCEDURE — 63600175 PHARM REV CODE 636 W HCPCS: Mod: JG | Performed by: SURGERY

## 2018-02-23 PROCEDURE — 25000003 PHARM REV CODE 250: Performed by: PHYSICIAN ASSISTANT

## 2018-02-23 PROCEDURE — 63600175 PHARM REV CODE 636 W HCPCS: Performed by: PHYSICIAN ASSISTANT

## 2018-02-23 PROCEDURE — 63600175 PHARM REV CODE 636 W HCPCS: Performed by: NURSE PRACTITIONER

## 2018-02-23 PROCEDURE — 25000003 PHARM REV CODE 250: Performed by: NURSE PRACTITIONER

## 2018-02-23 PROCEDURE — 80158 DRUG ASSAY CYCLOSPORINE: CPT

## 2018-02-23 PROCEDURE — 84100 ASSAY OF PHOSPHORUS: CPT

## 2018-02-23 PROCEDURE — 20600001 HC STEP DOWN PRIVATE ROOM

## 2018-02-23 PROCEDURE — 90935 HEMODIALYSIS ONE EVALUATION: CPT

## 2018-02-23 PROCEDURE — 94664 DEMO&/EVAL PT USE INHALER: CPT

## 2018-02-23 PROCEDURE — 25000003 PHARM REV CODE 250: Performed by: INTERNAL MEDICINE

## 2018-02-23 PROCEDURE — 85027 COMPLETE CBC AUTOMATED: CPT

## 2018-02-23 PROCEDURE — 83735 ASSAY OF MAGNESIUM: CPT

## 2018-02-23 PROCEDURE — 36415 COLL VENOUS BLD VENIPUNCTURE: CPT

## 2018-02-23 PROCEDURE — 96372 THER/PROPH/DIAG INJ SC/IM: CPT

## 2018-02-23 PROCEDURE — 85007 BL SMEAR W/DIFF WBC COUNT: CPT

## 2018-02-23 PROCEDURE — 25000003 PHARM REV CODE 250: Performed by: SURGERY

## 2018-02-23 PROCEDURE — 90935 HEMODIALYSIS ONE EVALUATION: CPT | Mod: ,,, | Performed by: INTERNAL MEDICINE

## 2018-02-23 PROCEDURE — 99233 SBSQ HOSP IP/OBS HIGH 50: CPT | Mod: ,,, | Performed by: PHYSICIAN ASSISTANT

## 2018-02-23 PROCEDURE — 99900035 HC TECH TIME PER 15 MIN (STAT)

## 2018-02-23 PROCEDURE — 80053 COMPREHEN METABOLIC PANEL: CPT

## 2018-02-23 RX ORDER — CYCLOSPORINE 100 MG/ML
150 SOLUTION ORAL 2 TIMES DAILY
Status: DISCONTINUED | OUTPATIENT
Start: 2018-02-23 | End: 2018-02-24

## 2018-02-23 RX ADMIN — OXYCODONE HYDROCHLORIDE 10 MG: 5 TABLET ORAL at 03:02

## 2018-02-23 RX ADMIN — SODIUM CHLORIDE 0.5 G: 0.9 INJECTION, SOLUTION INTRAVENOUS at 10:02

## 2018-02-23 RX ADMIN — CYCLOSPORINE 175 MG: 100 SOLUTION ORAL at 06:02

## 2018-02-23 RX ADMIN — BISACODYL 10 MG: 5 TABLET, COATED ORAL at 12:02

## 2018-02-23 RX ADMIN — METOCLOPRAMIDE HYDROCHLORIDE 5 MG: 5 SOLUTION ORAL at 10:02

## 2018-02-23 RX ADMIN — METOCLOPRAMIDE HYDROCHLORIDE 5 MG: 5 SOLUTION ORAL at 12:02

## 2018-02-23 RX ADMIN — LEVETIRACETAM 500 MG: 100 SOLUTION ORAL at 12:02

## 2018-02-23 RX ADMIN — ERYTHROPOIETIN 10000 UNITS: 10000 INJECTION, SOLUTION INTRAVENOUS; SUBCUTANEOUS at 10:02

## 2018-02-23 RX ADMIN — SODIUM CHLORIDE: 9 INJECTION, SOLUTION INTRAVENOUS at 12:02

## 2018-02-23 RX ADMIN — HEPARIN SODIUM 1000 UNITS: 1000 INJECTION, SOLUTION INTRAVENOUS; SUBCUTANEOUS at 10:02

## 2018-02-23 RX ADMIN — OMEPRAZOLE 40 MG: 40 CAPSULE, DELAYED RELEASE ORAL at 06:02

## 2018-02-23 RX ADMIN — URSODIOL 300 MG: 300 CAPSULE ORAL at 06:02

## 2018-02-23 RX ADMIN — ONDANSETRON HYDROCHLORIDE 4 MG: 2 INJECTION, SOLUTION INTRAMUSCULAR; INTRAVENOUS at 08:02

## 2018-02-23 RX ADMIN — ATOVAQUONE 1500 MG: 750 SUSPENSION ORAL at 12:02

## 2018-02-23 RX ADMIN — SODIUM CHLORIDE 350 ML: 0.9 INJECTION, SOLUTION INTRAVENOUS at 08:02

## 2018-02-23 RX ADMIN — URSODIOL 300 MG: 300 CAPSULE ORAL at 04:02

## 2018-02-23 RX ADMIN — OXYCODONE HYDROCHLORIDE 10 MG: 5 TABLET ORAL at 05:02

## 2018-02-23 RX ADMIN — METOCLOPRAMIDE HYDROCHLORIDE 5 MG: 5 SOLUTION ORAL at 05:02

## 2018-02-23 RX ADMIN — CYCLOSPORINE 150 MG: 100 SOLUTION ORAL at 05:02

## 2018-02-23 RX ADMIN — URSODIOL 300 MG: 300 CAPSULE ORAL at 10:02

## 2018-02-23 RX ADMIN — OXYCODONE HYDROCHLORIDE 10 MG: 5 TABLET ORAL at 11:02

## 2018-02-23 RX ADMIN — DRONABINOL 2.5 MG: 2.5 CAPSULE ORAL at 05:02

## 2018-02-23 RX ADMIN — LEVOTHYROXINE SODIUM 75 MCG: 75 TABLET ORAL at 06:02

## 2018-02-23 RX ADMIN — MORPHINE 100 MG: 10 SOLUTION ORAL at 04:02

## 2018-02-23 RX ADMIN — LEVETIRACETAM 500 MG: 100 SOLUTION ORAL at 10:02

## 2018-02-23 RX ADMIN — METOCLOPRAMIDE HYDROCHLORIDE 5 MG: 5 SOLUTION ORAL at 06:02

## 2018-02-23 RX ADMIN — ONDANSETRON HYDROCHLORIDE 4 MG: 2 INJECTION, SOLUTION INTRAMUSCULAR; INTRAVENOUS at 06:02

## 2018-02-23 NOTE — ASSESSMENT & PLAN NOTE
- Poor PO intake since transplant requiring short course of TPN during previous hospital stay.   - albumin remains decreased but appetite slowly improving per pt.   - Dietary consulted. Will need to closely monitor PO intake.   - supplements also ordered.   - SHANE with tube feeding start 1/16. Stopped 1/17/18, was not tolerating. Shane removed 1/20.  - prealbumin 7 on 1/15.  - TPN started 1/16/18.   - Pt able to eat minimally 1/21 and 1/22 which is an improvement.  - gi consulted - EGD reviewed, increased ppi bid, miralax bid, and metamucil bid, decreased narcotics.  - continue reglan, placed SHANE tube with tube feeds - transitioned to nightly feeds. Not tolerating well due to nausea.   - small bowel follow through with normal findings.   - consulted gi for possible gj tube placement, holding off on this for now as with ongoing fevers.  -Dietary changed tube feeds back to 24 hours from nightly 2/19 as not eating enough during the day to meet caloric needs. TF on hold since 2/20.  - retrial marinol 2/22 as pt reports that's the only thing helping his appetite, in the evening 2/2 concern for possible drowsiness  - pt feels marinol is helping

## 2018-02-23 NOTE — PLAN OF CARE
Problem: Patient Care Overview  Goal: Plan of Care Review  Outcome: Ongoing (interventions implemented as appropriate)  3.5 HR HD completed, net fluid [id number], target goal achieved. Pt tolerated well. Dialysis catheter ports locked with HEPARIN 1000units/ml to fill lumen volume, ends capped and secured. Report given to Ivy SHEEHAN.

## 2018-02-23 NOTE — ASSESSMENT & PLAN NOTE
- Started on vanc/cefepime for ERCP. Now transitioned to vanc/zosyn.  - Blood cultures sent, prelim NGTD. Repeat blood cultures and chest xray today 2/10.  - thoracentesis 2/1 - fluid negative for infection.  - atelectasis noted on ct scan - start breathing tx & CPT.  - now cmv positive - started treatment 2/5/18.  - taken to OR 2/7 for exp lap - fluid collections drained, cultures pending.  - Patient with low grade fever 2/15, 100.3.  -CT A/P 2/15 with no evidence of IA fluid infection. Did show R pleural effusion s/p thoracentesis, cell count negative for infection, cx pending.  -Repeat blood cx 2/15 prelim NGTD, CMV PCR 2/15 negative   Complaining of abdominal pain. Is tender in RUQ.  -Remains with fevers  - Bilirubin remains elevated. Concerning for cholangitis. Antibiotics broadened 2/17. ID re consulted.   - ERCP  2/19, overall unremarkable findings. Did place new larger stents. Will need repeat ERCP in 10 weeks. Bilirubin is stable post ERCP.  -Tmax 101 2/21 PM again ID following. Plan to transition vanc/zosyn to ertapenem 2/22 as no improvement noted. Cont to monitor.   - will obtain tagged WBC scan Monday per ID recs.

## 2018-02-23 NOTE — PLAN OF CARE
Problem: Patient Care Overview  Goal: Plan of Care Review  Outcome: Ongoing (interventions implemented as appropriate)  Pt AAOx4, VSS, T max 99. 1 episode emesis after PM medications administered through SHANE tube.   IV ertapenum & IV cresemba administered during shift.   Pt stated having one very small BM during shift. Pt anuric. Plan for HD in AM. Pt mom remain at pt bedside.  Fall risk precautions initiated.  Pt in lowest bed position setting, lighting adjusted, pt to wear nonskid socks when ambulating, side rails up x2.  Pt remain free from falls during shift.   Pt verbalize understanding to call when needed assistance.  Call light within reach.  Will continue to monitor.

## 2018-02-23 NOTE — PROGRESS NOTES
Follow up on transverse chevron incision      02/23/18 1300       Incision/Site 10/30/17 0911 abdomen transverse   Date First Assessed/Time First Assessed: 10/30/17 0911   Present Prior to Hospital Arrival?: Yes  Location: abdomen  Orientation: transverse   Wound Image     Incision WDL ex   Dressing Appearance Clean;Dry;Intact   Drainage Amount Scant   Drainage Characteristics/Odor Serous;No odor   Appearance Pink;Slough;White  (scant slough in  base)   Periwound Area Intact   Wound Edges Open   Wound Length (cm) 1   Wound Width (cm) 5   Depth (cm) 0.1   Care Cleansed with:;Sterile normal saline   Dressing Applied;Hydrocolloid     Have resolved two tiny adjacent wounds . Lateral aspect of incision yet to epiutheliaze. Continue hydrocolloid dressing to site   Zoë Murphy RN CWON  [id number]

## 2018-02-23 NOTE — NURSING
Notified Holli ABARCA of pt having 1 small bile colored, emesis after administering medications (liquid keppra, actigall, and reglan)  through SHANE tube.  IV zofran administered for N/V.  No new orders at this time.  Will continue to monitor.

## 2018-02-23 NOTE — SUBJECTIVE & OBJECTIVE
Interval History:   Patient evaluated at bedside in [place], no significant event overnight, blood pressures have been stable.     Review of patient's allergies indicates:   Allergen Reactions    Bactrim [sulfamethoxazole-trimethoprim] Other (See Comments)     Mookie Trell Syndrome     Current Facility-Administered Medications   Medication Frequency    0.9%  NaCl infusion (for blood administration) Q24H PRN    0.9%  NaCl infusion PRN    0.9%  NaCl infusion Once    acetaminophen tablet 650 mg Q6H PRN    albuterol-ipratropium 2.5mg-0.5mg/3mL nebulizer solution 3 mL Q4H PRN    atovaquone suspension 1,500 mg Daily    bisacodyl EC tablet 10 mg Daily    bisacodyl suppository 10 mg Daily PRN    cycloSPORINE (NEORAL) 100 mg/mL microemulsion solution 175 mg BID    dextrose 50% injection 12.5 g PRN    dextrose 50% injection 25 g PRN    docusate sodium capsule 100 mg BID    dronabinol capsule 2.5 mg Daily    epoetin maurisio injection 10,000 Units Every Mon, Wed, Fri    ertapenem (INVANZ) 0.5 g in sodium chloride 0.9% 100 mL IVPB Q24H    glucagon (human recombinant) injection 1 mg PRN    glucose chewable tablet 16 g PRN    glucose chewable tablet 24 g PRN    heparin (porcine) injection 1,000 Units PRN    heparin (porcine) injection 5,000 Units Q8H    isavuconazonium sulfate 372 mg in sodium chloride 0.9% 250 mL Q24H    levetiracetam oral soln Soln 500 mg BID    levothyroxine tablet 75 mcg Before breakfast    metoclopramide HCl 5 mg/5 mL solution 5 mg QID (AC & HS)    multivitamin tablet 1 tablet Daily    omeprazole capsule 40 mg QAM    ondansetron disintegrating tablet 8 mg Q6H PRN    ondansetron injection 4 mg Q6H PRN    oxyCODONE immediate release tablet 10 mg Q6H PRN    polyethylene glycol packet 17 g BID    prochlorperazine injection Soln 10 mg Q6H PRN    psyllium husk (aspartame) 3.4 gram PwPk 1 packet BID    simethicone chewable tablet 80 mg TID PRN    sodium chloride 0.9% flush 3 mL PRN     ursodiol oral suspension (conc: 60 mg/mL) 300 mg TID    valganciclovir 50 mg/ml oral solution 100 mg Every Mon, Wed, Fri       Objective:     Vital Signs (Most Recent):  Temp: 99 °F (37.2 °C) (02/23/18 0349)  Pulse: 81 (02/23/18 0349)  Resp: 20 (02/23/18 0349)  BP: 124/75 (02/23/18 0349)  SpO2: 96 % (02/23/18 0349)  O2 Device (Oxygen Therapy): room air (02/22/18 2039) Vital Signs (24h Range):  Temp:  [98.7 °F (37.1 °C)-99.6 °F (37.6 °C)] 99 °F (37.2 °C)  Pulse:  [74-90] 81  Resp:  [16-20] 20  SpO2:  [96 %-98 %] 96 %  BP: (122-133)/(66-75) 124/75     Weight: 64 kg (141 lb 1.5 oz) (02/22/18 0600)  Body mass index is 22.77 kg/m².  Body surface area is 1.73 meters squared.    I/O last 3 completed shifts:  In: 1420 [P.O.:280; NG/GT:240; IV Piggyback:900]  Out: 0     Physical Exam   Constitutional: He is oriented to person, place, and time. He appears well-developed. No distress.   HENT:   Head: Normocephalic and atraumatic.   Eyes: Conjunctivae are normal. Pupils are equal, round, and reactive to light.   Neck: Trachea normal. Neck supple. No JVD present.   Cardiovascular: Normal rate, regular rhythm, S1 normal, S2 normal and normal pulses.    Pulmonary/Chest: Effort normal and breath sounds normal.   Abdominal: Soft. Bowel sounds are normal. He exhibits distension, fluid wave and ascites.   Musculoskeletal: Normal range of motion. He exhibits edema.   Neurological: He is alert and oriented to person, place, and time.   Skin: Skin is warm and dry.   Vitals reviewed.      Significant Labs:  ABGs: No results for input(s): PH, PCO2, HCO3, POCSATURATED, BE in the last 168 hours.  BMP:   Recent Labs  Lab 02/23/18 0628   GLU 64*      CO2 22*   BUN 15   CREATININE 4.6*   CALCIUM 8.2*   MG 1.9     CBC:   Recent Labs  Lab 02/22/18  0547   WBC 2.35*   RBC 2.34*   HGB 7.4*   HCT 22.5*   *   MCV 96   MCH 31.6*   MCHC 32.9     CMP:   Recent Labs  Lab 02/23/18  0628   GLU 64*   CALCIUM 8.2*   ALBUMIN 1.7*   PROT 5.0*       K 4.0   CO2 22*      BUN 15   CREATININE 4.6*   ALKPHOS 86   ALT <5*   AST 20   BILITOT 2.1*     All labs within the past 24 hours have been reviewed.

## 2018-02-23 NOTE — PROGRESS NOTES
Pt arrived via stretcher per pt's escort,  maintainance HD initiated via RT IJ Tunneled catheter without difficulty, good flows obtained.

## 2018-02-23 NOTE — SUBJECTIVE & OBJECTIVE
Interval History: afebrile, no changes in clinical picture    Review of Systems   Constitutional: Negative for chills, fatigue and fever.   HENT: Negative for sore throat.    Respiratory: Negative for cough, chest tightness and shortness of breath.    Cardiovascular: Negative for chest pain, palpitations and leg swelling.   Gastrointestinal: Positive for abdominal pain. Negative for abdominal distention, diarrhea, nausea and vomiting.   Genitourinary: Negative for dysuria, flank pain and urgency.   Skin: Negative for rash.   Neurological: Negative for dizziness, light-headedness and numbness.   Psychiatric/Behavioral: Negative for confusion.     Objective:     Vital Signs (Most Recent):  Temp: 98.2 °F (36.8 °C) (02/23/18 0733)  Pulse: 78 (02/23/18 0900)  Resp: 20 (02/23/18 0349)  BP: 131/78 (02/23/18 0900)  SpO2: 96 % (02/23/18 0349) Vital Signs (24h Range):  Temp:  [98.2 °F (36.8 °C)-99 °F (37.2 °C)] 98.2 °F (36.8 °C)  Pulse:  [73-90] 78  Resp:  [16-20] 20  SpO2:  [96 %-98 %] 96 %  BP: (122-135)/(70-84) 131/78     Weight: 64 kg (141 lb 1.5 oz)  Body mass index is 22.77 kg/m².    Estimated Creatinine Clearance: 21.6 mL/min (A) (based on SCr of 4.6 mg/dL (H)).    Physical Exam   Constitutional: He is oriented to person, place, and time.   HENT:   Mouth/Throat: No oropharyngeal exudate.   Eyes: No scleral icterus.   Cardiovascular: Normal rate and regular rhythm.    Pulmonary/Chest: Effort normal and breath sounds normal. No respiratory distress. He has no wheezes. He has no rales.   Abdominal: Soft. There is tenderness. There is no rebound.   Musculoskeletal: He exhibits edema.   Lymphadenopathy:     He has no cervical adenopathy.   Neurological: He is alert and oriented to person, place, and time.   Skin: He is not diaphoretic.       Significant Labs:   Bilirubin:     Recent Labs  Lab 02/19/18  0528 02/20/18  0412 02/21/18  0437 02/22/18  0547 02/23/18  0628   BILITOT 2.1* 1.8* 2.0* 2.1* 2.1*     Blood Culture:      Recent Labs  Lab 01/30/18  1916 02/11/18  1236 02/11/18  1241 02/15/18  1729 02/21/18  0001   LABBLOO No growth after 5 days.  No growth after 5 days. No growth after 5 days. No growth after 5 days. No growth after 5 days.  No growth after 5 days. No Growth to date  No Growth to date  No Growth to date  No Growth to date  No Growth to date  No Growth to date     BMP:     Recent Labs  Lab 02/23/18  0628   GLU 64*      K 4.0      CO2 22*   BUN 15   CREATININE 4.6*   CALCIUM 8.2*   MG 1.9     CBC:     Recent Labs  Lab 02/22/18  0547 02/23/18  0628   WBC 2.35* 2.53*   HGB 7.4* 7.8*   HCT 22.5* 24.3*   * 152     CMP:     Recent Labs  Lab 02/22/18  0547 02/23/18  0628    137   K 3.7 4.0    104   CO2 23 22*   GLU 71 64*   BUN 10 15   CREATININE 3.4* 4.6*   CALCIUM 8.4* 8.2*   PROT 4.9* 5.0*   ALBUMIN 1.8* 1.7*   BILITOT 2.1* 2.1*   ALKPHOS 88 86   AST 21 20   ALT <5* <5*   ANIONGAP 10 11   EGFRNONAA 23.2* 16.1*     Microbiology Results (last 7 days)     Procedure Component Value Units Date/Time    Culture, Anaerobic [[id number]] Collected:  02/16/18 1411    Order Status:  Completed Specimen:  Body Fluid from Pleural Fluid Updated:  02/23/18 0951     Anaerobic Culture No anaerobes isolated    AFB Culture & Smear [[id number]] Collected:  01/11/18 1335    Order Status:  Completed Specimen:  Pleural Fluid from Lung, Left Updated:  02/23/18 0927     AFB Culture & Smear Culture in progress     AFB Culture & Smear Culture in progress     AFB CULTURE STAIN No acid fast bacilli seen.    Blood culture [[id number]] Collected:  02/21/18 0001    Order Status:  Completed Specimen:  Blood Updated:  02/23/18 0612     Blood Culture, Routine No Growth to date     Blood Culture, Routine No Growth to date     Blood Culture, Routine No Growth to date    Blood culture [[id number]] Collected:  02/21/18 0001    Order Status:  Completed Specimen:  Blood Updated:  02/23/18 0612     Blood Culture, Routine No  Growth to date     Blood Culture, Routine No Growth to date     Blood Culture, Routine No Growth to date    Fungus culture [[id number]] Collected:  02/07/18 1452    Order Status:  Completed Specimen:  Body Fluid from Peritoneal Fluid Updated:  02/22/18 0810     Fungus (Mycology) Culture Culture in progress     Fungus (Mycology) Culture No fungus isolated after 2 weeks    Fungus culture [[id number]] Collected:  02/16/18 1411    Order Status:  Completed Specimen:  Body Fluid from Pleural Fluid Updated:  02/22/18 0717     Fungus (Mycology) Culture Culture in progress    Narrative:       Right    Blood culture [[id number]] Collected:  02/15/18 1729    Order Status:  Completed Specimen:  Blood Updated:  02/20/18 2012     Blood Culture, Routine No growth after 5 days.    Narrative:       Collection has been rescheduled by SAN1 at 2/15/2018 16:57 Reason:   Patient getting procedure; doctor asked for lab to return later.  Collection has been rescheduled by SAN1 at 2/15/2018 16:57 Reason:   Patient getting procedure; doctor asked for lab to return later.    Blood culture [[id number]] Collected:  02/15/18 1729    Order Status:  Completed Specimen:  Blood Updated:  02/20/18 2012     Blood Culture, Routine No growth after 5 days.    Narrative:       Collection has been rescheduled by SAN1 at 2/15/2018 16:57 Reason:   Patient getting procedure; doctor asked for lab to return later.  Collection has been rescheduled by SAN1 at 2/15/2018 16:57 Reason:   Patient getting procedure; doctor asked for lab to return later.    Fungus culture [[id number]] Collected:  01/19/18 1101    Order Status:  Completed Specimen:  Abdominal from Ascites Updated:  02/20/18 1357     Fungus (Mycology) Culture No fungus isolated after 4 weeks    Fungus culture [[id number]] Collected:  01/19/18 1140    Order Status:  Completed Specimen:  Pleural Fluid from Pleural Fluid Updated:  02/20/18 1357     Fungus (Mycology) Culture No fungus isolated after 4 weeks     Culture, Body Fluid (Aerobic) w/ GS [[id number]] Collected:  02/16/18 1411    Order Status:  Completed Specimen:  Body Fluid from Pleural Fluid Updated:  02/19/18 1053     AEROBIC CULTURE - FLUID No growth     Gram Stain Result Rare WBC's      No organisms seen    Blood culture [[id number]] Collected:  02/11/18 1241    Order Status:  Completed Specimen:  Blood Updated:  02/16/18 1412     Blood Culture, Routine No growth after 5 days.    Narrative:       Collection has been rescheduled by TRM at 2/11/2018 11:34 Reason: due   now  Collection has been rescheduled by TRM at 2/11/2018 11:34 Reason: due   now    Blood culture [[id number]] Collected:  02/11/18 1236    Order Status:  Completed Specimen:  Blood Updated:  02/16/18 1412     Blood Culture, Routine No growth after 5 days.    Narrative:       Collection has been rescheduled by TRM at 2/11/2018 11:34 Reason: due   now  Collection has been rescheduled by TRM at 2/11/2018 11:34 Reason: due   now          Significant Imaging: I have reviewed all pertinent imaging results/findings within the past 24 hours.

## 2018-02-23 NOTE — ASSESSMENT & PLAN NOTE
28 y/o man w/a history of asthma and alcoholic cirrhosis (c/b HE, EV, portal HTN, and HRS; s/p DDLT 10/19/2017, CMV D+/R+, steroid induction, on maintenance tacro/MMF/pred; c/b seizures, LAURA, superificial wound infection s/p wound vac to [facility] through 1/12, and several recent admissions on 11/24 and 12/3 with culture negative peritonitis with peak WBC ~5k due to suspected indolent biliary leakage s/p sphincterotomy/biliary stent placement over CBD stricture on 12/6 ERCP although notably with no overt leak noted during procedure) who was admitted on 1/11/2017 with acute onset fevers and acute on chronic N/V/abdominal pain (RLQ worst) in the setting of persistent noninflammatory ascites of unknown etiology. He underwent repeated drainage of pleural fluid and ascites that did not show evidence of gross infection and given that fevers were antibiotic responsive and walled off biloma was suspected, he underwent ex-lap on 2/8/18 where a walled off presumed partly bilious collection was noted in the RLQ and debrided (cultures negative). He was tapered to oral antibiotics after his procedure given negative cultures but has spiked a temperature again with a similar constellation of symptoms arguing for a similar cause. ERCP 2/19/18: 2 biliary tree stents removed, 2 stents placed on CBD.    - continue Ertapenem, plan for 2 weeks of therapy  - follow WBC scan

## 2018-02-23 NOTE — PROGRESS NOTES
[facility]  Liver Transplant  Progress Note    Patient Name: Jhonny Diana  MRN: [id number]  Admission Date: 2018  Hospital Length of Stay: 43 days  Code Status: Full Code  Primary Care Provider: Primary Doctor No  Post-Operative Day: 127    ORGAN:   LIVER  Disease Etiology: Acute Alcoholic Hepatitis  Donor Type:    - Brain Death  CDC High Risk:   No  Donor CMV Status:   Donor CMV Status: Positive  Donor HBcAB:   Negative  Donor HCV Status:   Negative  Whole or Partial: Whole Liver  Biliary Anastomosis: End to End  Arterial Anatomy: Standard  Subjective:     History of Present Illness:  Jhonny Diana is a 26 y/o male with past medical history of alcoholic cirrhosis.  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 Found to have peritonitis in November (WBC 5000, 75% PNM) neg for bile leak. He was treated initially with vanc/cefepime. Repeat cell counts  with some improvement (WBC 1400, 45% PNM). He has undergone multiple paracenteses as well as abscess drainage of perihepatic fluid collections and treated with antimicrobial therapy but no positive cultures. Of note, biliary stricture also identified and ERCP performed on 2017 with sphincterotomy and biliary stent placed. Liver tests still have not normalized despite intervention, bilirubin and AP remain elevated. Other pertinent PMH current wound vac in place 2/2 wound infection, malnutrition requiring TPN for short course and ongoing hypoalbuminemia, and seizure activity while on prograf and has since been switched to cyclosporine without reoccurrence.  He presented to the ER for fever, abdominal pain, and N/V. He reports fever (103) for 1  day prior. Overnight, he developed N/V, reports small amount of green emesis with new left sided pain. He also endorses worsening SOB with exertion. He was scheduled as an outpatient for follow up paracentesis and IR drainage of fluid collection. CXR in ER shows large pleural effusion with subsegmental atelectasis. Infectious work up initiated in ER. His ANC is 900. Broad spectrum antibiotics initiated in ED. He denies chest pain, palpitations, diarrhea, constipation, or back pain. Denies any sick contacts.    Hospital Course:  Thoracentesis (1.2L off), Paracentesis (1.6L off), and IR drainage of fluid collection 1/11, all fluids negative for infection. ID consulted. Broad spectrum antibiotics d/c'd 1/15. Chronically malnourished with poor PO intake, prealbumin 7. Shane tube placed 1/15 for tube feedings.     ERCP 1/17 with sludge and a biliary stone which was removed and stent exchanged.  Remained with n/v; therefore, TF remained on hold and TPN continued.   Pt with fever s/p ERCP on 1/17 which continued until 1/19.  Vanc/cefepime restarted.  Blood cx 1/17 and 1/18 NGTD.  ID reconsulted.  Fungal markers sent.  CT C/A/P obtained.  With large R pleural effusion and ascites- both drained 1/19 and negative for infx per cell count. Pt afebrile 1/20. Liver biopsy 1/23 - without rejection.  EGD 1/25 - unremarkable for source of GI symptoms.  Resume diet along with other GI recommendations. SHANE tube placed for tube feeds.       2/22: Pt again spiked temp of 101 overnight, now down to 99. Resent Bcx overnight, prelim NGTD. Transitioned to Erta from vanc/zosyn per ID. Planning for tagged WBC scan on Monday. R sided abdominal erythema/dependent edema improving today.  TF have been on hold since 2/20. Will continue to hold today per pt request. Pt working on diet. KUB yesterday with significant stool and gas. Declines colace, miralax, metamucil, but willing to take daily bisacodyl. Pt agreeable to suppository today. If  suppository not successful can give mag citrate through dylan tonight. Will also retrial Marinol tonight as pt reported improvement in appetite previously. Will monitor closely for sedation.     2/23 Interval hx:  Pt feeling ok today.  2 small BM yesterday.  Tolerated HD.  [place] removed.  Will attempt to eat this PM.  If does not eat, will need to readdress nutrition in AM.    Scheduled Meds:   atovaquone  1,500 mg Oral Daily    bisacodyl  10 mg Oral Daily    cycloSPORINE  150 mg Oral BID    docusate sodium  100 mg Oral BID    dronabinol  2.5 mg Oral Daily    epoetin maurisio (PROCRIT) injection  10,000 Units Intravenous Every Mon, Wed, Fri    ertapenem (INVANZ) IVPB  500 mg Intravenous Q24H    heparin (porcine)  5,000 Units Subcutaneous Q8H    custom IVPB builder   Intravenous Q24H    levetiracetam oral soln  500 mg Oral BID    levothyroxine  75 mcg Oral Before breakfast    metoclopramide HCl  5 mg Oral QID (AC & HS)    multivitamin  1 tablet Oral Daily    omeprazole  40 mg Oral QAM    polyethylene glycol  17 g Oral BID    psyllium husk (aspartame)  3.4 g Oral BID    ursodiol  300 mg Oral TID    valganciclovir 50 mg/ml  100 mg Oral Every Mon, Wed, Fri     Continuous Infusions:  PRN Meds:sodium chloride, sodium chloride 0.9%, acetaminophen, albuterol-ipratropium 2.5mg-0.5mg/3mL, bisacodyl, dextrose 50%, dextrose 50%, glucagon (human recombinant), glucose, glucose, heparin (porcine), ondansetron, ondansetron, oxyCODONE, prochlorperazine, simethicone, sodium chloride 0.9%    Review of Systems   Constitutional: Positive for activity change, appetite change, fatigue and fever. Negative for chills.   HENT: Negative.  Negative for congestion and facial swelling.    Eyes: Negative for pain, discharge and visual disturbance.   Respiratory: Negative for cough, chest tightness, shortness of breath and wheezing.    Cardiovascular: Negative for chest pain, palpitations and leg swelling.   Gastrointestinal: Positive  for abdominal distention, abdominal pain and nausea. Negative for constipation and vomiting.   Endocrine: Negative.    Genitourinary: Positive for decreased urine volume. Negative for difficulty urinating.        Anuric   Musculoskeletal: Negative for arthralgias, back pain and myalgias.   Skin: Positive for color change and wound. Negative for rash.   Allergic/Immunologic: Positive for immunocompromised state.   Neurological: Negative for dizziness, seizures, weakness and headaches.   Psychiatric/Behavioral: Positive for decreased concentration and dysphoric mood. Negative for confusion. The patient is not nervous/anxious.    All other systems reviewed and are negative.    Objective:     Vital Signs (Most Recent):  Temp: 99 °F (37.2 °C) (02/23/18 1535)  Pulse: 88 (02/23/18 1535)  Resp: 18 (02/23/18 1535)  BP: 126/67 (02/23/18 1535)  SpO2: 95 % (02/23/18 1535) Vital Signs (24h Range):  Temp:  [98.2 °F (36.8 °C)-99.2 °F (37.3 °C)] 99 °F (37.2 °C)  Pulse:  [73-96] 88  Resp:  [15-20] 18  SpO2:  [95 %-100 %] 95 %  BP: (122-137)/(65-84) 126/67     Weight: 64 kg (141 lb 1.5 oz)  Body mass index is 22.77 kg/m².    Intake/Output - Last 3 Shifts       02/21 0700 - 02/22 0659 02/22 0700 - 02/23 0659 02/23 0700 - 02/24 0659    P.O. 130 150 540    Other 650  650    NG/ 120     IV Piggyback 450 450     Total Intake(mL/kg) 1350 (21.1) 720 (11.3) 1190 (18.6)    Urine (mL/kg/hr) 0 (0)      Emesis/NG output 0 (0)      Other 2650 (1.7)  2650 (3.9)    Stool 0 (0)      Blood 0 (0)      Total Output 2650   2650    Net -1300 +720 -1460           Urine Occurrence 0 x 0 x     Stool Occurrence 1 x 2 x     Emesis Occurrence 0 x 1 x           Physical Exam   Constitutional: He is oriented to person, place, and time. He appears well-developed. No distress.   Temporal and distal extremity muscle wasting   HENT:   Head: Normocephalic and atraumatic.   Mouth/Throat: No oropharyngeal exudate.   dylan tube   Eyes: EOM are normal. Pupils are  equal, round, and reactive to light. Scleral icterus is present.   Neck: Normal range of motion. Neck supple. No JVD present. No thyromegaly present.   Cardiovascular: Normal rate, regular rhythm, normal heart sounds and intact distal pulses.    No murmur heard.  Pulmonary/Chest: Effort normal. No respiratory distress. He has decreased breath sounds in the right middle field, the right lower field and the left lower field. He has no wheezes. He exhibits no tenderness.   Diminished to RLL   Abdominal: Soft. Bowel sounds are normal. He exhibits ascites. He exhibits no distension. There is tenderness. There is no rebound and no guarding.   Dressing to chevron.  Wd vac removed 1/12/18  Dependent edema present R flank; surrounding erythema   Musculoskeletal: Normal range of motion. He exhibits edema (2+ LE edema). He exhibits no tenderness.   Neurological: He is alert and oriented to person, place, and time. He has normal reflexes.   Skin: Skin is warm and dry. Capillary refill takes 2 to 3 seconds. He is not diaphoretic. No erythema.   jaundice   Psychiatric: He has a normal mood and affect. His behavior is normal. Judgment and thought content normal. His mood appears not anxious.   Depressed mood   Nursing note and vitals reviewed.      Laboratory:  Immunosuppressants         Stop Route Frequency     cycloSPORINE (NEORAL) 100 mg/mL microemulsion solution 150 mg      -- Oral 2 times daily        CBC:     Recent Labs  Lab 02/23/18  0628   WBC 2.53*   RBC 2.51*   HGB 7.8*   HCT 24.3*      MCV 97   MCH 31.1*   MCHC 32.1     CMP:     Recent Labs  Lab 02/23/18  0628   GLU 64*   CALCIUM 8.2*   ALBUMIN 1.7*   PROT 5.0*      K 4.0   CO2 22*      BUN 15   CREATININE 4.6*   ALKPHOS 86   ALT <5*   AST 20   BILITOT 2.1*     Labs within the past 24 hours have been reviewed.    Diagnostic Results:  pertinent imaging reviewed    Assessment/Plan:     Liver transplanted    - Post op course complicated by fevers and  hyperbilirubinemia.  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  - AST/ALT normal. Tbili/alkphos remain elevated.  - Liver US 1/8 showed 3.9 cm complex fluid collection anterior to right lobe and moderate nonspecific complex ascites inferior to transplant.   - IR placed drain 1/11/18, cell count negative for infection.  - PBS consulted. PBS not comfortable proceeding with EUS with liver biopsy given fever and possible infectious process going on. ERCP 1/17 with stone and sludge. Pt placed on Actigall - treated with 10 day course of abx.  ID now signed off.  - Bili elevated - liver u/s 1/28  - Liver biopsy 1/23 without rejection.  - consulted PBS for ERCP - Choledocholithiasis was found along with sludge, stents placed 1/30.  - ERCP 2/19, overall unremarkable findings. Did place new larger stents. Will need repeat ERCP in 10 weeks. Bilirubin is improving though post ERCP.        Long-term use of immunosuppressant medication    - Maintenance IS with cyclosporine. MMF on hold for infections and neutropenia. Continue to check cyclosporine level daily. Assess for toxicity and adjust level as needed.        Prophylactic immunotherapy    - See long term use of immunosuppression.         At risk for opportunistic infections    - CMV detected 2/1 now on tx dose valcyte. CMV PCR 2/15 not detected. CMV PCR 2/22, pending.   - bactrim held.            Other cytomegaloviral diseases    - detected at 370 on CMV PCR 2/1.  - case discussed with ID and will hold off on treatment at this time given low WBC.   - undetected CMV PCR 2/8.  - continue Valcyte 200 mg every Mon, Wed, Fri.  - CMV PCR 2/15 negative  - Per ID, valcyte dose decreased to ppx dose on 2/18.  - CMV PCR 2/22, pending.         Fever    - Started on vanc/cefepime for ERCP. Now transitioned to vanc/zosyn.  - Blood cultures sent, prelim NGTD. Repeat blood cultures and chest xray today 2/10.  - thoracentesis 2/1 - fluid negative for infection.  -  atelectasis noted on ct scan - start breathing tx & CPT.  - now cmv positive - started treatment 2/5/18.  - taken to OR 2/7 for exp lap - fluid collections drained, cultures pending.  - Patient with low grade fever 2/15, 100.3.  -CT A/P 2/15 with no evidence of IA fluid infection. Did show R pleural effusion s/p thoracentesis, cell count negative for infection, cx pending.  -Repeat blood cx 2/15 prelim NGTD, CMV PCR 2/15 negative   Complaining of abdominal pain. Is tender in RUQ.  -Remains with fevers  - Bilirubin remains elevated. Concerning for cholangitis. Antibiotics broadened 2/17. ID re consulted.   - ERCP  2/19, overall unremarkable findings. Did place new larger stents. Will need repeat ERCP in 10 weeks. Bilirubin is stable post ERCP.  -Tmax 101 2/21 PM again ID following. Plan to transition vanc/zosyn to ertapenem 2/22 as no improvement noted. Cont to monitor.   - will obtain tagged WBC scan Monday per ID recs.             Constipation    - Enema ordered 1/20 and 1/21 with BM.  - Encourage ambulation.  - decrease narcotics.  - d/c metamucil and miralax bid as makes patient nauseated.  - increase reglan qid.  - small bowel follow through with normal findings.   - patient reports last BM 2/19   -schedule bisacodyl  -KUB 2/21 with significant amounts of stool and gas. Pt declines miralax, metamucil, colace.   -encouraged suppository.  - May try enema in AM        Other ascites    - Paracentesis performed 1/11/18 with 1600 ml removed.    - fluid negative for infection.   - Repeat para 1/19 negative for infection per cell count.  - no fluid seen for paracentesis on 1/23.        Severe protein-calorie malnutrition    - Poor PO intake since transplant requiring short course of TPN during previous hospital stay.   - albumin remains decreased but appetite slowly improving per pt.   - Dietary consulted. Will need to closely monitor PO intake.   - supplements also ordered.   - SHANE with tube feeding start 1/16. Stopped  1/17/18, was not tolerating. Shane removed 1/20.  - prealbumin 7 on 1/15.  - TPN started 1/16/18.   - Pt able to eat minimally 1/21 and 1/22 which is an improvement.  - gi consulted - EGD reviewed, increased ppi bid, miralax bid, and metamucil bid, decreased narcotics.  - continue reglan, placed SHANE tube with tube feeds - transitioned to nightly feeds. Not tolerating well due to nausea.   - small bowel follow through with normal findings.   - consulted gi for possible gj tube placement, holding off on this for now as with ongoing fevers.  -Dietary changed tube feeds back to 24 hours from nightly 2/19 as not eating enough during the day to meet caloric needs. TF on hold since 2/20.  - retrial marinol 2/22 as pt reports that's the only thing helping his appetite, in the evening 2/2 concern for possible drowsiness  - pt feels marinol is helping          Nausea and vomiting    - h/o constipation, reports having regular BM's with bowel regimen.  - vomiting episode x 1 at home. Anti-emetics ordered PRN.  - KUB repeated 1/14/18 given increased abdominal pain- mild, generalized bowel distention suggesting ileus.  Diet changed to clears as tolerated for bowel rest .  - SHANE tube placed 1/16 for tube feeds. Not tolerating tube feeds, stopped 1/17/18.  - D/C shane 1/19, now in place as of 1/25.  - chronic constipation, Cont bowel regimen.   - See protein calorie malnutrition.  - improved slightly again today. TF have been on hold since 2/20 for N/V.   - D/C shane and monitor appetite          Recurrent pleural effusion on right    - CXR in ER with large right pleural effusion with subjective c/o worsening SOB  - thoracentesis completed- 1200 ml removed.  SOB with significant improvement.   - Cell count reviewed and negative for infection.   - resp even and non labored.  O2 sat 94-98%.  - Repeat thora 1/19 negative for infection per cell count.  - Reaccumulating fluid on xray 1/21.  - thoracentesis 1/31 - 1.4L removed, negative for  infection  - Reaccumulating fluid again on xray 2/10.   -Thoracentesis performed  2/16 with 1500 cc off, cell count neg for infection, cx no growth to date.         Biliary stricture of transplanted liver    - tbili with increase prompting ERCP 1/30 with stones and sludge, stents placed.  -  Repeat ERCP 2/19 as was having recurrent fevers with bilirubin stuck at 2.0-2.2 with overall unremarkable findings seen. Did place new larger stents. Will need repeat ERCP in 10 weeks.   -Bilirubin at 2.1 today        Anemia of chronic disease    - H/H stable. Cont to monitor with daily cbc.         Delayed surgical wound healing    - wd vac removed 1/12/18.  Wound healing well. Aquacel AG and Mepilex border dressing applied via wound care recs.           Acute renal failure with tubular necrosis    - HD resumed on previous admission. Now anuric and dialyzes M-W-F.  - Nephrology following.    - ktm consulted for possible kidney transplant.            VTE Risk Mitigation         Ordered     heparin (porcine) injection 1,000 Units  As needed (PRN)     Route:  Intra-Catheter        02/05/18 0945     heparin (porcine) injection 5,000 Units  Every 8 hours     Route:  Subcutaneous        01/31/18 0957     Medium Risk of VTE  Once      01/11/18 0351     Place sequential compression device  Until discontinued      01/11/18 0351          The patients clinical status was discussed at multidisplinary rounds, involving transplant surgery, transplant medicine, pharmacy, nursing, nutrition, and social work    Discharge Planning:  Monitor kidney function --> may need outpt HD  Monitor HH needs vs rehab for deconditioned status      Geronimo Vera PA-C  Liver Transplant  [facility]

## 2018-02-23 NOTE — ASSESSMENT & PLAN NOTE
- Enema ordered 1/20 and 1/21 with BM.  - Encourage ambulation.  - decrease narcotics.  - d/c metamucil and miralax bid as makes patient nauseated.  - increase reglan qid.  - small bowel follow through with normal findings.   - patient reports last BM 2/19   -schedule bisacodyl  -KUB 2/21 with significant amounts of stool and gas. Pt declines miralax, metamucil, colace.   -encouraged suppository.  - May try enema in AM

## 2018-02-23 NOTE — ASSESSMENT & PLAN NOTE
- h/o constipation, reports having regular BM's with bowel regimen.  - vomiting episode x 1 at home. Anti-emetics ordered PRN.  - KUB repeated 1/14/18 given increased abdominal pain- mild, generalized bowel distention suggesting ileus.  Diet changed to clears as tolerated for bowel rest .  - SHANE tube placed 1/16 for tube feeds. Not tolerating tube feeds, stopped 1/17/18.  - D/C shane 1/19, now in place as of 1/25.  - chronic constipation, Cont bowel regimen.   - See protein calorie malnutrition.  - improved slightly again today. TF have been on hold since 2/20 for N/V.   - D/C shane and monitor appetite

## 2018-02-23 NOTE — SUBJECTIVE & OBJECTIVE
Scheduled Meds:   atovaquone  1,500 mg Oral Daily    bisacodyl  10 mg Oral Daily    cycloSPORINE  150 mg Oral BID    docusate sodium  100 mg Oral BID    dronabinol  2.5 mg Oral Daily    epoetin maurisio (PROCRIT) injection  10,000 Units Intravenous Every Mon, Wed, Fri    ertapenem (INVANZ) IVPB  500 mg Intravenous Q24H    heparin (porcine)  5,000 Units Subcutaneous Q8H    custom IVPB builder   Intravenous Q24H    levetiracetam oral soln  500 mg Oral BID    levothyroxine  75 mcg Oral Before breakfast    metoclopramide HCl  5 mg Oral QID (AC & HS)    multivitamin  1 tablet Oral Daily    omeprazole  40 mg Oral QAM    polyethylene glycol  17 g Oral BID    psyllium husk (aspartame)  3.4 g Oral BID    ursodiol  300 mg Oral TID    valganciclovir 50 mg/ml  100 mg Oral Every Mon, Wed, Fri     Continuous Infusions:  PRN Meds:sodium chloride, sodium chloride 0.9%, acetaminophen, albuterol-ipratropium 2.5mg-0.5mg/3mL, bisacodyl, dextrose 50%, dextrose 50%, glucagon (human recombinant), glucose, glucose, heparin (porcine), ondansetron, ondansetron, oxyCODONE, prochlorperazine, simethicone, sodium chloride 0.9%    Review of Systems   Constitutional: Positive for activity change, appetite change, fatigue and fever. Negative for chills.   HENT: Negative.  Negative for congestion and facial swelling.    Eyes: Negative for pain, discharge and visual disturbance.   Respiratory: Negative for cough, chest tightness, shortness of breath and wheezing.    Cardiovascular: Negative for chest pain, palpitations and leg swelling.   Gastrointestinal: Positive for abdominal distention, abdominal pain and nausea. Negative for constipation and vomiting.   Endocrine: Negative.    Genitourinary: Positive for decreased urine volume. Negative for difficulty urinating.        Anuric   Musculoskeletal: Negative for arthralgias, back pain and myalgias.   Skin: Positive for color change and wound. Negative for rash.   Allergic/Immunologic:  Positive for immunocompromised state.   Neurological: Negative for dizziness, seizures, weakness and headaches.   Psychiatric/Behavioral: Positive for decreased concentration and dysphoric mood. Negative for confusion. The patient is not nervous/anxious.    All other systems reviewed and are negative.    Objective:     Vital Signs (Most Recent):  Temp: 99 °F (37.2 °C) (02/23/18 1535)  Pulse: 88 (02/23/18 1535)  Resp: 18 (02/23/18 1535)  BP: 126/67 (02/23/18 1535)  SpO2: 95 % (02/23/18 1535) Vital Signs (24h Range):  Temp:  [98.2 °F (36.8 °C)-99.2 °F (37.3 °C)] 99 °F (37.2 °C)  Pulse:  [73-96] 88  Resp:  [15-20] 18  SpO2:  [95 %-100 %] 95 %  BP: (122-137)/(65-84) 126/67     Weight: 64 kg (141 lb 1.5 oz)  Body mass index is 22.77 kg/m².    Intake/Output - Last 3 Shifts       02/21 0700 - 02/22 0659 02/22 0700 - 02/23 0659 02/23 0700 - 02/24 0659    P.O. 130 150 540    Other 650  650    NG/ 120     IV Piggyback 450 450     Total Intake(mL/kg) 1350 (21.1) 720 (11.3) 1190 (18.6)    Urine (mL/kg/hr) 0 (0)      Emesis/NG output 0 (0)      Other 2650 (1.7)  2650 (3.9)    Stool 0 (0)      Blood 0 (0)      Total Output 2650   2650    Net -1300 +720 -1460           Urine Occurrence 0 x 0 x     Stool Occurrence 1 x 2 x     Emesis Occurrence 0 x 1 x           Physical Exam   Constitutional: He is oriented to person, place, and time. He appears well-developed. No distress.   Temporal and distal extremity muscle wasting   HENT:   Head: Normocephalic and atraumatic.   Mouth/Throat: No oropharyngeal exudate.   dylan tube   Eyes: EOM are normal. Pupils are equal, round, and reactive to light. Scleral icterus is present.   Neck: Normal range of motion. Neck supple. No JVD present. No thyromegaly present.   Cardiovascular: Normal rate, regular rhythm, normal heart sounds and intact distal pulses.    No murmur heard.  Pulmonary/Chest: Effort normal. No respiratory distress. He has decreased breath sounds in the right middle field,  the right lower field and the left lower field. He has no wheezes. He exhibits no tenderness.   Diminished to RLL   Abdominal: Soft. Bowel sounds are normal. He exhibits ascites. He exhibits no distension. There is tenderness. There is no rebound and no guarding.   Dressing to chevron.  Wd vac removed 1/12/18  Dependent edema present R flank; surrounding erythema   Musculoskeletal: Normal range of motion. He exhibits edema (2+ LE edema). He exhibits no tenderness.   Neurological: He is alert and oriented to person, place, and time. He has normal reflexes.   Skin: Skin is warm and dry. Capillary refill takes 2 to 3 seconds. He is not diaphoretic. No erythema.   jaundice   Psychiatric: He has a normal mood and affect. His behavior is normal. Judgment and thought content normal. His mood appears not anxious.   Depressed mood   Nursing note and vitals reviewed.      Laboratory:  Immunosuppressants         Stop Route Frequency     cycloSPORINE (NEORAL) 100 mg/mL microemulsion solution 150 mg      -- Oral 2 times daily        CBC:     Recent Labs  Lab 02/23/18  0628   WBC 2.53*   RBC 2.51*   HGB 7.8*   HCT 24.3*      MCV 97   MCH 31.1*   MCHC 32.1     CMP:     Recent Labs  Lab 02/23/18  0628   GLU 64*   CALCIUM 8.2*   ALBUMIN 1.7*   PROT 5.0*      K 4.0   CO2 22*      BUN 15   CREATININE 4.6*   ALKPHOS 86   ALT <5*   AST 20   BILITOT 2.1*     Labs within the past 24 hours have been reviewed.    Diagnostic Results:  pertinent imaging reviewed

## 2018-02-23 NOTE — ASSESSMENT & PLAN NOTE
LAURA dialysisi dependant since 1016/2017  [place] [facility] [place] [facility] [place] the cre of Dr. Peterson  On HD for: 2.5 mo since 10/16/2017  Duration of outpatient dialysis session - 3.5 hrs  EDW - TBD  Residual Renal Function - yes     Plan:  - Will provide dialysis for metabolic clearance and volume management in am via RIJ-PermCath, duration 3.5 hrs, UF 1-2 L as tolerated by patient, maintain MAP>65, will adjust bath to chem.   - Blood pressures stable

## 2018-02-23 NOTE — PROGRESS NOTES
[facility]  Nephrology  Progress Note    Patient Name: Jhonny Diana  MRN: [id number]  Admission Date: 1/11/2018  Hospital Length of Stay: 43 days  Attending Provider: Nathanael Medina MD   Primary Care Physician: Primary Doctor No  Principal Problem:Fever    Subjective:     HPI: Jhonny Diana is a 28 y/o  male with PMHx relevant for ETOH cirrhosis.  S/p DDLT 10/19/2017; c/b seizures (swtiched off prograf to cyclo), ATN dialysis dependant in iHD MWF, last HD on 1/10, anuric, superficial wound infection s/p wound vac to chevron incision, and multiple admissions for fevers finally dx with peritonitis in November (WBC 5000, 75% PNM) neg for bile leak. HE gets recurrent paracentesis and has peritoneal drain. Other pertinent PMH current wound vac in place 2/2 wound infection, malnutrition requiring TPN for short course and ongoing hypoalbuminemia, and seizure activity while on prograf and has since been switched to cyclosporine without reoccurrence.  He is admitted to LTx service secondary to fever, abdominal pain, and N/V. He reports fever (103) for 1 day prior. Overnight, he developed N/V, reports small amount of green emesis with new left sided pain. He also endorses worsening SOB with exertion. He was scheduled as an outpatient for follow up paracentesis and IR drainage of fluid collection. CXR in ER shows large pleural effusion with subsegmental atelectasis. His ANC is 900. Broad spectrum antibiotics initiated in ED. Nephrology consulted for LAURA dialysis dependant co management. He dialyses at [facility] under the care of Dr. Peterson via IM[facility] rowe.      Interval History:   Patient evaluated at bedside in NINO, no significant event overnight, blood pressures have been stable.     Review of patient's allergies indicates:   Allergen Reactions    Bactrim [sulfamethoxazole-trimethoprim] Other (See Comments)     Mookie Trell Syndrome     Current Facility-Administered Medications   Medication  Frequency    0.9%  NaCl infusion (for blood administration) Q24H PRN    0.9%  NaCl infusion PRN    0.9%  NaCl infusion Once    acetaminophen tablet 650 mg Q6H PRN    albuterol-ipratropium 2.5mg-0.5mg/3mL nebulizer solution 3 mL Q4H PRN    atovaquone suspension 1,500 mg Daily    bisacodyl EC tablet 10 mg Daily    bisacodyl suppository 10 mg Daily PRN    cycloSPORINE (NEORAL) 100 mg/mL microemulsion solution 175 mg BID    dextrose 50% injection 12.5 g PRN    dextrose 50% injection 25 g PRN    docusate sodium capsule 100 mg BID    dronabinol capsule 2.5 mg Daily    epoetin maurisio injection 10,000 Units Every Mon, Wed, Fri    ertapenem (INVANZ) 0.5 g in sodium chloride 0.9% 100 mL IVPB Q24H    glucagon (human recombinant) injection 1 mg PRN    glucose chewable tablet 16 g PRN    glucose chewable tablet 24 g PRN    heparin (porcine) injection 1,000 Units PRN    heparin (porcine) injection 5,000 Units Q8H    isavuconazonium sulfate 372 mg in sodium chloride 0.9% 250 mL Q24H    levetiracetam oral soln Soln 500 mg BID    levothyroxine tablet 75 mcg Before breakfast    metoclopramide HCl 5 mg/5 mL solution 5 mg QID (AC & HS)    multivitamin tablet 1 tablet Daily    omeprazole capsule 40 mg QAM    ondansetron disintegrating tablet 8 mg Q6H PRN    ondansetron injection 4 mg Q6H PRN    oxyCODONE immediate release tablet 10 mg Q6H PRN    polyethylene glycol packet 17 g BID    prochlorperazine injection Soln 10 mg Q6H PRN    psyllium husk (aspartame) 3.4 gram PwPk 1 packet BID    simethicone chewable tablet 80 mg TID PRN    sodium chloride 0.9% flush 3 mL PRN    ursodiol oral suspension (conc: 60 mg/mL) 300 mg TID    valganciclovir 50 mg/ml oral solution 100 mg Every Mon, Wed, Fri       Objective:     Vital Signs (Most Recent):  Temp: 99 °F (37.2 °C) (02/23/18 0349)  Pulse: 81 (02/23/18 0349)  Resp: 20 (02/23/18 0349)  BP: 124/75 (02/23/18 0349)  SpO2: 96 % (02/23/18 0349)  O2 Device (Oxygen  Therapy): room air (02/22/18 2039) Vital Signs (24h Range):  Temp:  [98.7 °F (37.1 °C)-99.6 °F (37.6 °C)] 99 °F (37.2 °C)  Pulse:  [74-90] 81  Resp:  [16-20] 20  SpO2:  [96 %-98 %] 96 %  BP: (122-133)/(66-75) 124/75     Weight: 64 kg (141 lb 1.5 oz) (02/22/18 0600)  Body mass index is 22.77 kg/m².  Body surface area is 1.73 meters squared.    I/O last 3 completed shifts:  In: 1420 [P.O.:280; NG/GT:240; IV Piggyback:900]  Out: 0     Physical Exam   Constitutional: He is oriented to person, place, and time. He appears well-developed. No distress.   HENT:   Head: Normocephalic and atraumatic.   Eyes: Conjunctivae are normal. Pupils are equal, round, and reactive to light.   Neck: Trachea normal. Neck supple. No JVD present.   Cardiovascular: Normal rate, regular rhythm, S1 normal, S2 normal and normal pulses.    Pulmonary/Chest: Effort normal and breath sounds normal.   Abdominal: Soft. Bowel sounds are normal. He exhibits distension, fluid wave and ascites.   Musculoskeletal: Normal range of motion. He exhibits edema.   Neurological: He is alert and oriented to person, place, and time.   Skin: Skin is warm and dry.   Vitals reviewed.      Significant Labs:  ABGs: No results for input(s): PH, PCO2, HCO3, POCSATURATED, BE in the last 168 hours.  BMP:   Recent Labs  Lab 02/23/18  0628   GLU 64*      CO2 22*   BUN 15   CREATININE 4.6*   CALCIUM 8.2*   MG 1.9     CBC:   Recent Labs  Lab 02/22/18  0547   WBC 2.35*   RBC 2.34*   HGB 7.4*   HCT 22.5*   *   MCV 96   MCH 31.6*   MCHC 32.9     CMP:   Recent Labs  Lab 02/23/18  0628   GLU 64*   CALCIUM 8.2*   ALBUMIN 1.7*   PROT 5.0*      K 4.0   CO2 22*      BUN 15   CREATININE 4.6*   ALKPHOS 86   ALT <5*   AST 20   BILITOT 2.1*     All labs within the past 24 hours have been reviewed.         Assessment/Plan:     Acute renal failure with tubular necrosis    LAURA dialysisi dependant since 1016/2017  [place] [facility] [place] [facility] [place] the cre of Dr. Peterson  On HD  for: 2.5 mo since 10/16/2017  Duration of outpatient dialysis session - 3.5 hrs  EDW - TBD  Residual Renal Function - yes     Plan:  - Will provide dialysis for metabolic clearance and volume management in am via RIJ-PermCath, duration 3.5 hrs, UF 1-2 L as tolerated by patient, maintain MAP>65, will adjust bath to chem.   - Blood pressures stable          Win Sandoval  Nephrology  Fellow  [facility]    Pager [phone] have reviewed and concur with the fellow's history, physical, assessment, and plan. I have personally interviewed and examined the patient at bedside.

## 2018-02-24 LAB
ALBUMIN SERPL BCP-MCNC: 1.8 G/DL
ALP SERPL-CCNC: 89 U/L
ALT SERPL W/O P-5'-P-CCNC: <5 U/L
ANION GAP SERPL CALC-SCNC: 9 MMOL/L
AST SERPL-CCNC: 18 U/L
BASOPHILS # BLD AUTO: 0.02 K/UL
BASOPHILS NFR BLD: 0.9 %
BILIRUB SERPL-MCNC: 1.9 MG/DL
BUN SERPL-MCNC: 7 MG/DL
CALCIUM SERPL-MCNC: 8.8 MG/DL
CHLORIDE SERPL-SCNC: 106 MMOL/L
CO2 SERPL-SCNC: 25 MMOL/L
CREAT SERPL-MCNC: 3.2 MG/DL
CYCLOSPORINE BLD LC/MS/MS-MCNC: 237 NG/ML
DIFFERENTIAL METHOD: ABNORMAL
EOSINOPHIL # BLD AUTO: 0 K/UL
EOSINOPHIL NFR BLD: 0.5 %
ERYTHROCYTE [DISTWIDTH] IN BLOOD BY AUTOMATED COUNT: 16.4 %
EST. GFR  (AFRICAN AMERICAN): 28.9 ML/MIN/1.73 M^2
EST. GFR  (NON AFRICAN AMERICAN): 25 ML/MIN/1.73 M^2
GLUCOSE SERPL-MCNC: 71 MG/DL
HCT VFR BLD AUTO: 23.3 %
HGB BLD-MCNC: 7.4 G/DL
IMM GRANULOCYTES # BLD AUTO: 0.08 K/UL
IMM GRANULOCYTES NFR BLD AUTO: 3.6 %
LYMPHOCYTES # BLD AUTO: 0.9 K/UL
LYMPHOCYTES NFR BLD: 40.5 %
MAGNESIUM SERPL-MCNC: 1.8 MG/DL
MCH RBC QN AUTO: 31 PG
MCHC RBC AUTO-ENTMCNC: 31.8 G/DL
MCV RBC AUTO: 98 FL
MONOCYTES # BLD AUTO: 0.4 K/UL
MONOCYTES NFR BLD: 17.1 %
NEUTROPHILS # BLD AUTO: 0.8 K/UL
NEUTROPHILS NFR BLD: 37.4 %
NRBC BLD-RTO: 0 /100 WBC
PHOSPHATE SERPL-MCNC: 2.3 MG/DL
PLATELET # BLD AUTO: 165 K/UL
PLATELET BLD QL SMEAR: ABNORMAL
PMV BLD AUTO: 11 FL
POTASSIUM SERPL-SCNC: 3.7 MMOL/L
PROT SERPL-MCNC: 5 G/DL
RBC # BLD AUTO: 2.39 M/UL
SODIUM SERPL-SCNC: 140 MMOL/L
WBC # BLD AUTO: 2.22 K/UL

## 2018-02-24 PROCEDURE — 99233 SBSQ HOSP IP/OBS HIGH 50: CPT | Mod: ,,, | Performed by: PHYSICIAN ASSISTANT

## 2018-02-24 PROCEDURE — 63600175 PHARM REV CODE 636 W HCPCS: Performed by: NURSE PRACTITIONER

## 2018-02-24 PROCEDURE — 80158 DRUG ASSAY CYCLOSPORINE: CPT

## 2018-02-24 PROCEDURE — 80177 DRUG SCRN QUAN LEVETIRACETAM: CPT

## 2018-02-24 PROCEDURE — 85025 COMPLETE CBC W/AUTO DIFF WBC: CPT

## 2018-02-24 PROCEDURE — 83735 ASSAY OF MAGNESIUM: CPT

## 2018-02-24 PROCEDURE — 80053 COMPREHEN METABOLIC PANEL: CPT

## 2018-02-24 PROCEDURE — 25000003 PHARM REV CODE 250: Performed by: NURSE PRACTITIONER

## 2018-02-24 PROCEDURE — A4217 STERILE WATER/SALINE, 500 ML: HCPCS | Performed by: PHYSICIAN ASSISTANT

## 2018-02-24 PROCEDURE — 25000003 PHARM REV CODE 250: Performed by: SURGERY

## 2018-02-24 PROCEDURE — 84100 ASSAY OF PHOSPHORUS: CPT

## 2018-02-24 PROCEDURE — 63600175 PHARM REV CODE 636 W HCPCS: Performed by: PHYSICIAN ASSISTANT

## 2018-02-24 PROCEDURE — 99233 SBSQ HOSP IP/OBS HIGH 50: CPT | Mod: ,,, | Performed by: INTERNAL MEDICINE

## 2018-02-24 PROCEDURE — B4185 PARENTERAL SOL 10 GM LIPIDS: HCPCS | Performed by: PHYSICIAN ASSISTANT

## 2018-02-24 PROCEDURE — 25000003 PHARM REV CODE 250: Performed by: PHYSICIAN ASSISTANT

## 2018-02-24 PROCEDURE — 63600175 PHARM REV CODE 636 W HCPCS: Mod: JG | Performed by: SURGERY

## 2018-02-24 PROCEDURE — 36415 COLL VENOUS BLD VENIPUNCTURE: CPT

## 2018-02-24 PROCEDURE — 20600001 HC STEP DOWN PRIVATE ROOM

## 2018-02-24 RX ORDER — TACROLIMUS 1 MG/1
1 CAPSULE ORAL 2 TIMES DAILY
Status: DISCONTINUED | OUTPATIENT
Start: 2018-02-24 | End: 2018-03-01

## 2018-02-24 RX ORDER — OXYCODONE HYDROCHLORIDE 5 MG/1
5 TABLET ORAL
Status: DISCONTINUED | OUTPATIENT
Start: 2018-02-24 | End: 2018-02-24

## 2018-02-24 RX ORDER — METOCLOPRAMIDE HYDROCHLORIDE 5 MG/5ML
10 SOLUTION ORAL
Status: DISCONTINUED | OUTPATIENT
Start: 2018-02-24 | End: 2018-03-08

## 2018-02-24 RX ADMIN — OXYCODONE HYDROCHLORIDE 10 MG: 5 TABLET ORAL at 09:02

## 2018-02-24 RX ADMIN — ATOVAQUONE 1500 MG: 750 SUSPENSION ORAL at 08:02

## 2018-02-24 RX ADMIN — URSODIOL 300 MG: 300 CAPSULE ORAL at 09:02

## 2018-02-24 RX ADMIN — ONDANSETRON HYDROCHLORIDE 4 MG: 2 INJECTION, SOLUTION INTRAMUSCULAR; INTRAVENOUS at 09:02

## 2018-02-24 RX ADMIN — METOCLOPRAMIDE HYDROCHLORIDE 10 MG: 5 SOLUTION ORAL at 05:02

## 2018-02-24 RX ADMIN — TACROLIMUS 1 MG: 1 CAPSULE ORAL at 05:02

## 2018-02-24 RX ADMIN — METOCLOPRAMIDE HYDROCHLORIDE 10 MG: 5 SOLUTION ORAL at 09:02

## 2018-02-24 RX ADMIN — BISACODYL 10 MG: 5 TABLET, COATED ORAL at 08:02

## 2018-02-24 RX ADMIN — OXYCODONE HYDROCHLORIDE 10 MG: 5 TABLET ORAL at 01:02

## 2018-02-24 RX ADMIN — SODIUM CHLORIDE 0.5 G: 0.9 INJECTION, SOLUTION INTRAVENOUS at 08:02

## 2018-02-24 RX ADMIN — METOCLOPRAMIDE HYDROCHLORIDE 5 MG: 5 SOLUTION ORAL at 06:02

## 2018-02-24 RX ADMIN — ONDANSETRON HYDROCHLORIDE 4 MG: 2 INJECTION, SOLUTION INTRAMUSCULAR; INTRAVENOUS at 05:02

## 2018-02-24 RX ADMIN — SODIUM CHLORIDE: 234 INJECTION INTRAMUSCULAR; INTRAVENOUS; SUBCUTANEOUS at 09:02

## 2018-02-24 RX ADMIN — TACROLIMUS 1 MG: 1 CAPSULE ORAL at 11:02

## 2018-02-24 RX ADMIN — URSODIOL 300 MG: 300 CAPSULE ORAL at 05:02

## 2018-02-24 RX ADMIN — OXYCODONE HYDROCHLORIDE 10 MG: 5 TABLET ORAL at 04:02

## 2018-02-24 RX ADMIN — LEVETIRACETAM 500 MG: 100 SOLUTION ORAL at 08:02

## 2018-02-24 RX ADMIN — SODIUM CHLORIDE: 9 INJECTION, SOLUTION INTRAVENOUS at 02:02

## 2018-02-24 RX ADMIN — SOYBEAN OIL 250 ML: 20 INJECTION, SOLUTION INTRAVENOUS at 09:02

## 2018-02-24 RX ADMIN — DRONABINOL 2.5 MG: 2.5 CAPSULE ORAL at 05:02

## 2018-02-24 RX ADMIN — METOCLOPRAMIDE HYDROCHLORIDE 10 MG: 5 SOLUTION ORAL at 11:02

## 2018-02-24 RX ADMIN — OMEPRAZOLE 40 MG: 40 CAPSULE, DELAYED RELEASE ORAL at 06:02

## 2018-02-24 RX ADMIN — LEVOTHYROXINE SODIUM 75 MCG: 75 TABLET ORAL at 05:02

## 2018-02-24 RX ADMIN — LEVETIRACETAM 500 MG: 100 SOLUTION ORAL at 09:02

## 2018-02-24 RX ADMIN — PROCHLORPERAZINE EDISYLATE 10 MG: 5 INJECTION INTRAMUSCULAR; INTRAVENOUS at 02:02

## 2018-02-24 NOTE — ASSESSMENT & PLAN NOTE
- CMV detected 2/1 now on tx dose valcyte. CMV PCR 2/15 not detected. CMV PCR 2/22, negative   - Pentam given 2/24

## 2018-02-24 NOTE — ASSESSMENT & PLAN NOTE
- Enema ordered 1/20 and 1/21 with BM.  - Encourage ambulation.  - decrease narcotics.  - d/c metamucil and miralax bid as makes patient nauseated.  - increase reglan qid.  - small bowel follow through with normal findings.   - patient reports last BM 2/19   -schedule bisacodyl  -KUB 2/21 with significant amounts of stool and gas. Pt declines miralax, metamucil, colace.   -encouraged suppository.  - Improved on KUB 2/23

## 2018-02-24 NOTE — ASSESSMENT & PLAN NOTE
- Maintenance IS with cyclosporine --> plan to transition to Prograf today for continued nausea.  -  MMF on hold for infections and neutropenia.   - Continue to check cyclosporine and Prograf level daily during transition. Assess for toxicity and adjust level as needed.

## 2018-02-24 NOTE — PROGRESS NOTES
[facility]  Liver Transplant  Progress Note    Patient Name: Jhonny Diana  MRN: [id number]  Admission Date: 2018  Hospital Length of Stay: 44 days  Code Status: Full Code  Primary Care Provider: Primary Doctor No  Post-Operative Day: 128    ORGAN:   LIVER  Disease Etiology: Acute Alcoholic Hepatitis  Donor Type:    - Brain Death  CDC High Risk:   No  Donor CMV Status:   Donor CMV Status: Positive  Donor HBcAB:   Negative  Donor HCV Status:   Negative  Whole or Partial: Whole Liver  Biliary Anastomosis: End to End  Arterial Anatomy: Standard  Subjective:     History of Present Illness:  Jhonny Diana is a 28 y/o male with past medical history of alcoholic cirrhosis.  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 Found to have peritonitis in November (WBC 5000, 75% PNM) neg for bile leak. He was treated initially with vanc/cefepime. Repeat cell counts  with some improvement (WBC 1400, 45% PNM). He has undergone multiple paracenteses as well as abscess drainage of perihepatic fluid collections and treated with antimicrobial therapy but no positive cultures. Of note, biliary stricture also identified and ERCP performed on 2017 with sphincterotomy and biliary stent placed. Liver tests still have not normalized despite intervention, bilirubin and AP remain elevated. Other pertinent PMH current wound vac in place 2/2 wound infection, malnutrition requiring TPN for short course and ongoing hypoalbuminemia, and seizure activity while on prograf and has since been switched to cyclosporine without reoccurrence.  He presented to the ER for fever, abdominal pain, and N/V. He reports fever (103) for 1  day prior. Overnight, he developed N/V, reports small amount of green emesis with new left sided pain. He also endorses worsening SOB with exertion. He was scheduled as an outpatient for follow up paracentesis and IR drainage of fluid collection. CXR in ER shows large pleural effusion with subsegmental atelectasis. Infectious work up initiated in ER. His ANC is 900. Broad spectrum antibiotics initiated in ED. He denies chest pain, palpitations, diarrhea, constipation, or back pain. Denies any sick contacts.    Hospital Course:  Thoracentesis (1.2L off), Paracentesis (1.6L off), and IR drainage of fluid collection 1/11, all fluids negative for infection. ID consulted. Broad spectrum antibiotics d/c'd 1/15. Chronically malnourished with poor PO intake, prealbumin 7. Shane tube placed 1/15 for tube feedings.     ERCP 1/17 with sludge and a biliary stone which was removed and stent exchanged.  Remained with n/v; therefore, TF remained on hold and TPN continued.   Pt with fever s/p ERCP on 1/17 which continued until 1/19.  Vanc/cefepime restarted.  Blood cx 1/17 and 1/18 NGTD.  ID reconsulted.  Fungal markers sent.  CT C/A/P obtained.  With large R pleural effusion and ascites- both drained 1/19 and negative for infx per cell count. Pt afebrile 1/20. Liver biopsy 1/23 - without rejection.  EGD 1/25 - unremarkable for source of GI symptoms.  Resume diet along with other GI recommendations. SHANE tube placed for tube feeds.       2/22: Pt again spiked temp of 101 overnight, now down to 99. Resent Bcx overnight, prelim NGTD. Transitioned to Erta from vanc/zosyn per ID. Planning for tagged WBC scan on Monday. R sided abdominal erythema/dependent edema improving today.  TF have been on hold since 2/20. Will continue to hold today per pt request. Pt working on diet. KUB yesterday with significant stool and gas. Declines colace, miralax, metamucil, but willing to take daily bisacodyl. Pt agreeable to suppository today. If  suppository not successful can give mag citrate through dylan tonight. Will also retrial Marinol tonight as pt reported improvement in appetite previously. Will monitor closely for sedation.     2/23:  2 small BM 2/22.  Tolerated HD.  Dylan removed.  Will attempt to eat this PM.      2/24:  Pt ate then vomited hamburger 2/23.  Will resume TPN.  D/C atovaquone, transition cyclo to Prograf, check Keppra level, increase Reglan to 0 mg, change twyla to BID for nausea.  Will give pentam today.      Scheduled Meds:   bisacodyl  10 mg Oral Daily    docusate sodium  100 mg Oral BID    dronabinol  2.5 mg Oral Daily    epoetin maurisio (PROCRIT) injection  10,000 Units Intravenous Every Mon, Wed, Fri    ertapenem (INVANZ) IVPB  500 mg Intravenous Q24H    fat emulsion 20%  250 mL Intravenous Daily    heparin (porcine)  5,000 Units Subcutaneous Q8H    custom IVPB builder   Intravenous Q24H    levetiracetam oral soln  500 mg Oral BID    levothyroxine  75 mcg Oral Before breakfast    metoclopramide HCl  10 mg Oral QID (AC & HS)    multivitamin  1 tablet Oral Daily    omeprazole  40 mg Oral QAM    polyethylene glycol  17 g Oral BID    psyllium husk (aspartame)  3.4 g Oral BID    tacrolimus  1 mg Oral BID    ursodiol  300 mg Oral BID    valganciclovir 50 mg/ml  100 mg Oral Every Mon, Wed, Fri     Continuous Infusions:   TPN ADULT CENTRAL LINE CUSTOM       PRN Meds:sodium chloride, sodium chloride 0.9%, acetaminophen, albuterol-ipratropium 2.5mg-0.5mg/3mL, bisacodyl, dextrose 50%, dextrose 50%, glucagon (human recombinant), glucose, glucose, heparin (porcine), ondansetron, ondansetron, oxyCODONE, prochlorperazine, simethicone, sodium chloride 0.9%    Review of Systems   Constitutional: Positive for activity change, appetite change and fatigue. Negative for chills and fever.   HENT: Negative.  Negative for congestion and facial swelling.    Eyes: Negative for pain, discharge and visual disturbance.   Respiratory: Negative  for cough, chest tightness, shortness of breath and wheezing.    Cardiovascular: Negative for chest pain, palpitations and leg swelling.   Gastrointestinal: Positive for abdominal distention, abdominal pain and nausea. Negative for constipation.   Endocrine: Negative.    Genitourinary: Positive for decreased urine volume. Negative for difficulty urinating.        Anuric   Musculoskeletal: Negative for arthralgias, back pain and myalgias.   Skin: Positive for color change and wound. Negative for rash.   Allergic/Immunologic: Positive for immunocompromised state.   Neurological: Negative for dizziness, seizures, weakness and headaches.   Psychiatric/Behavioral: Positive for decreased concentration and dysphoric mood. Negative for confusion. The patient is not nervous/anxious.    All other systems reviewed and are negative.    Objective:     Vital Signs (Most Recent):  Temp: 98.6 °F (37 °C) (02/24/18 1112)  Pulse: 80 (02/24/18 1112)  Resp: 15 (02/24/18 1112)  BP: 129/80 (02/24/18 1112)  SpO2: 96 % (02/24/18 1112) Vital Signs (24h Range):  Temp:  [98.6 °F (37 °C)-99.8 °F (37.7 °C)] 98.6 °F (37 °C)  Pulse:  [75-97] 80  Resp:  [15-18] 15  SpO2:  [95 %-98 %] 96 %  BP: (117-132)/(67-80) 129/80     Weight: 70.1 kg (154 lb 8.7 oz)  Body mass index is 24.94 kg/m².    Intake/Output - Last 3 Shifts       02/22 0700 - 02/23 0659 02/23 0700 - 02/24 0659 02/24 0700 - 02/25 0659    P.O. 150 1260 180    Other  650     NG/      IV Piggyback 450 100     Total Intake(mL/kg) 720 (11.3) 2010 (28.7) 180 (2.6)    Urine (mL/kg/hr)  0 (0)     Emesis/NG output       Other  2650 (1.6)     Stool  0 (0)     Blood       Total Output   2650      Net +720 -640 +180           Urine Occurrence 0 x      Stool Occurrence 2 x 1 x     Emesis Occurrence 1 x            Physical Exam   Constitutional: He is oriented to person, place, and time. He appears well-developed. No distress.   Temporal and distal extremity muscle wasting   HENT:   Head:  Normocephalic and atraumatic.   Mouth/Throat: No oropharyngeal exudate.   dylan tube   Eyes: EOM are normal. Pupils are equal, round, and reactive to light. Scleral icterus is present.   Neck: Normal range of motion. Neck supple. No JVD present. No thyromegaly present.   Cardiovascular: Normal rate, regular rhythm, normal heart sounds and intact distal pulses.    No murmur heard.  Pulmonary/Chest: Effort normal. No respiratory distress. He has decreased breath sounds in the right middle field, the right lower field and the left lower field. He has no wheezes. He exhibits no tenderness.   Diminished to RLL   Abdominal: Soft. Bowel sounds are normal. He exhibits ascites. He exhibits no distension. There is tenderness. There is no rebound and no guarding.   Dressing to chevron.  Wd vac removed 1/12/18  Dependent edema present R flank; surrounding erythema   Musculoskeletal: Normal range of motion. He exhibits edema (2+ LE edema). He exhibits no tenderness.   Neurological: He is alert and oriented to person, place, and time. He has normal reflexes.   Skin: Skin is warm and dry. Capillary refill takes 2 to 3 seconds. He is not diaphoretic. No erythema.   jaundice   Psychiatric: He has a normal mood and affect. His behavior is normal. Judgment and thought content normal. His mood appears not anxious.   Depressed mood   Nursing note and vitals reviewed.      Laboratory:  Immunosuppressants         Stop Route Frequency     tacrolimus capsule 1 mg      -- Oral 2 times daily        CBC:     Recent Labs  Lab 02/24/18  0507   WBC 2.22*   RBC 2.39*   HGB 7.4*   HCT 23.3*      MCV 98   MCH 31.0   MCHC 31.8*     CMP:     Recent Labs  Lab 02/24/18  0506   GLU 71   CALCIUM 8.8   ALBUMIN 1.8*   PROT 5.0*      K 3.7   CO2 25      BUN 7   CREATININE 3.2*   ALKPHOS 89   ALT <5*   AST 18   BILITOT 1.9*     Labs within the past 24 hours have been reviewed.    Diagnostic Results:  pertinent imaging  reviewed    Assessment/Plan:     Liver transplanted    - Post op course complicated by fevers and hyperbilirubinemia.  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  - AST/ALT normal. Tbili/alkphos remain elevated.  - Liver US 1/8 showed 3.9 cm complex fluid collection anterior to right lobe and moderate nonspecific complex ascites inferior to transplant.   - IR placed drain 1/11/18, cell count negative for infection.  - PBS consulted. PBS not comfortable proceeding with EUS with liver biopsy given fever and possible infectious process going on. ERCP 1/17 with stone and sludge. Pt placed on Actigall - treated with 10 day course of abx.  ID now signed off.  - Bili elevated - liver u/s 1/28  - Liver biopsy 1/23 without rejection.  - consulted PBS for ERCP - Choledocholithiasis was found along with sludge, stents placed 1/30.  - ERCP 2/19, overall unremarkable findings. Did place new larger stents. Will need repeat ERCP in 10 weeks. Bilirubin stuck at 2        Long-term use of immunosuppressant medication    - Maintenance IS with cyclosporine --> plan to transition to Prograf today for continued nausea.  -  MMF on hold for infections and neutropenia.   - Continue to check cyclosporine and Prograf level daily during transition. Assess for toxicity and adjust level as needed.        Prophylactic immunotherapy    - See long term use of immunosuppression.         At risk for opportunistic infections    - CMV detected 2/1 now on tx dose valcyte. CMV PCR 2/15 not detected. CMV PCR 2/22, negative   - Pentam given 2/24            Other cytomegaloviral diseases    - detected at 370 on CMV PCR 2/1.  - case discussed with ID and will hold off on treatment at this time given low WBC.   - undetected CMV PCR 2/8.  - continue Valcyte 200 mg every Mon, Wed, Fri.  - CMV PCR 2/15 negative  - Per ID, valcyte dose decreased to ppx dose on 2/18.  - CMV PCR 2/22 negative        Fever    - Started on vanc/cefepime for  ERCP. Now transitioned to vanc/zosyn.  - Blood cultures sent, prelim NGTD. Repeat blood cultures and chest xray today 2/10.  - thoracentesis 2/1 - fluid negative for infection.  - atelectasis noted on ct scan - start breathing tx & CPT.  - now cmv positive - started treatment 2/5/18.  - taken to OR 2/7 for exp lap - fluid collections drained, cultures pending.  - Patient with low grade fever 2/15, 100.3.  -CT A/P 2/15 with no evidence of IA fluid infection. Did show R pleural effusion s/p thoracentesis, cell count negative for infection, cx pending.  -Repeat blood cx 2/15 prelim NGTD, CMV PCR 2/15 negative   Complaining of abdominal pain. Is tender in RUQ.  -Remains with fevers  - Bilirubin remains elevated. Concerning for cholangitis. Antibiotics broadened 2/17. ID re consulted.   - ERCP  2/19, overall unremarkable findings. Did place new larger stents. Will need repeat ERCP in 10 weeks. Bilirubin is stable post ERCP.  -Tmax 101 2/21 PM again ID following. Plan to transition vanc/zosyn to ertapenem 2/22 as no improvement noted. Cont to monitor.   - will obtain tagged WBC scan Monday per ID recs.             Constipation    - Enema ordered 1/20 and 1/21 with BM.  - Encourage ambulation.  - decrease narcotics.  - d/c metamucil and miralax bid as makes patient nauseated.  - increase reglan qid.  - small bowel follow through with normal findings.   - patient reports last BM 2/19   -schedule bisacodyl  -KUB 2/21 with significant amounts of stool and gas. Pt declines miralax, metamucil, colace.   -encouraged suppository.  - Improved on KUB 2/23        Other ascites    - Paracentesis performed 1/11/18 with 1600 ml removed.    - fluid negative for infection.   - Repeat para 1/19 negative for infection per cell count.  - no fluid seen for paracentesis on 1/23.        Severe protein-calorie malnutrition    - Poor PO intake since transplant requiring short course of TPN during previous hospital stay.   - albumin remains  decreased but appetite slowly improving per pt.   - Dietary consulted. Will need to closely monitor PO intake.   - supplements also ordered.   - SHANE with tube feeding start 1/16. Stopped 1/17/18, was not tolerating. Shane removed 1/20.  - prealbumin 7 on 1/15.  - TPN started 1/16/18.   - Pt able to eat minimally 1/21 and 1/22 which is an improvement.  - gi consulted - EGD reviewed, increased ppi bid, miralax bid, and metamucil bid, decreased narcotics.  - continue reglan, placed SHANE tube with tube feeds - transitioned to nightly feeds. Not tolerating well due to nausea.   - small bowel follow through with normal findings.   - consulted gi for possible gj tube placement, holding off on this for now as with ongoing fevers.  -Dietary changed tube feeds back to 24 hours from nightly 2/19 as not eating enough during the day to meet caloric needs. TF on hold since 2/20.  - retrial marinol 2/22 as pt reports that's the only thing helping his appetite, in the evening 2/2 concern for possible drowsiness  - pt feels marinol is helping  - Pt vomited 2/23 after trying hamburger  - Start TPN tonight          Nausea and vomiting    - h/o constipation, reports having regular BM's with bowel regimen.  - vomiting episode x 1 at home. Anti-emetics ordered PRN.  - KUB repeated 1/14/18 given increased abdominal pain- mild, generalized bowel distention suggesting ileus.  Diet changed to clears as tolerated for bowel rest .  - SHANE tube placed 1/16 for tube feeds. Not tolerating tube feeds, stopped 1/17/18.  - D/C shane 1/19, now in place as of 1/25.  - chronic constipation, Cont bowel regimen.   - See protein calorie malnutrition.  - TF have been on hold since 2/20 for N/V.   - D/C shane 2/23 as not tolerating TF  - 2/24 changes: Increase Reglan to 10 mg, D/C atovaquone, transitioned cyclo to Prograf, change twyla to BID, check Keppra level  - Repeat EGD this week if nausea does not improve          Recurrent pleural effusion on right    - CXR  in ER with large right pleural effusion with subjective c/o worsening SOB  - thoracentesis completed- 1200 ml removed.  SOB with significant improvement.   - Cell count reviewed and negative for infection.   - resp even and non labored.  O2 sat 94-98%.  - Repeat thora 1/19 negative for infection per cell count.  - Reaccumulating fluid on xray 1/21.  - thoracentesis 1/31 - 1.4L removed, negative for infection  - Reaccumulating fluid again on xray 2/10.   -Thoracentesis performed  2/16 with 1500 cc off, cell count neg for infection, cx no growth to date.         Biliary stricture of transplanted liver    - tbili with increase prompting ERCP 1/30 with stones and sludge, stents placed.  -  Repeat ERCP 2/19 as was having recurrent fevers with bilirubin stuck at 2.0-2.2 with overall unremarkable findings seen. Did place new larger stents. Will need repeat ERCP in 10 weeks.   -Bilirubin at 2.1 today        Anemia of chronic disease    - H/H stable. Cont to monitor with daily cbc.         Delayed surgical wound healing    - wd vac removed 1/12/18.  Wound healing well. Aquacel AG and Mepilex border dressing applied via wound care recs.           Acute renal failure with tubular necrosis    - HD resumed on previous admission. Now anuric and dialyzes M-W-F.  - Nephrology following.    - ktm consulted for possible kidney transplant.            VTE Risk Mitigation         Ordered     heparin (porcine) injection 1,000 Units  As needed (PRN)     Route:  Intra-Catheter        02/05/18 0945     heparin (porcine) injection 5,000 Units  Every 8 hours     Route:  Subcutaneous        01/31/18 0957     Medium Risk of VTE  Once      01/11/18 0351     Place sequential compression device  Until discontinued      01/11/18 0351          The patients clinical status was discussed at multidisplinary rounds, involving transplant surgery, transplant medicine, pharmacy, nursing, nutrition, and social work    Discharge Planning:  Monitor kidney  function --> may need outpt HD  Monitor HH needs vs rehab for deconditioned status      Geronimo Vera PA-C  Liver Transplant  [facility]

## 2018-02-24 NOTE — PROGRESS NOTES
[facility]  Infectious Disease  Progress Note    Patient Name: Jhonny Diana  MRN: [id number]  Admission Date: 1/11/2018  Length of Stay: 44 days  Attending Physician: Nathanael Medina MD  Primary Care Provider: Primary Doctor No    Isolation Status: No active isolations  Assessment/Plan:      Fever    28 y/o man w/a history of asthma and alcoholic cirrhosis (c/b HE, EV, portal HTN, and HRS; s/p DDLT 10/19/2017, CMV D+/R+, steroid induction, on maintenance tacro/MMF/pred; c/b seizures, LAURA, superificial wound infection s/p wound vac to [facility] through 1/12, and several recent admissions on 11/24 and 12/3 with culture negative peritonitis with peak WBC ~5k due to suspected indolent biliary leakage s/p sphincterotomy/biliary stent placement over CBD stricture on 12/6 ERCP although notably with no overt leak noted during procedure) who was admitted on 1/11/2017 with acute onset fevers and acute on chronic N/V/abdominal pain (RLQ worst) in the setting of persistent noninflammatory ascites of unknown etiology. He underwent repeated drainage of pleural fluid and ascites that did not show evidence of gross infection and given that fevers were antibiotic responsive and walled off biloma was suspected, he underwent ex-lap on 2/8/18 where a walled off presumed partly bilious collection was noted in the RLQ and debrided (cultures negative). He was tapered to oral antibiotics after his procedure given negative cultures but has spiked a temperature again with a similar constellation of symptoms arguing for a similar cause. ERCP 2/19/18: 2 biliary tree stents removed, 2 stents placed on CBD.    - continue Ertapenem, plan for 2 weeks of therapy  - follow WBC scan                  Anticipated Disposition: pending    Thank you for your consult. I will follow-up with patient. Please contact us if you have any additional questions.    Rk Che MD  Infectious Disease Fellow, PGY-5  Spectra: [phone]  Pager:  [phone]  [facility]    Subjective:     Principal Problem:Fever    HPI: No notes on file  Interval History: afebrile, no changes in clinical picture    Review of Systems   Constitutional: Negative for chills, fatigue and fever.   HENT: Negative for sore throat.    Respiratory: Negative for cough, chest tightness and shortness of breath.    Cardiovascular: Negative for chest pain, palpitations and leg swelling.   Gastrointestinal: Positive for abdominal pain. Negative for abdominal distention, diarrhea, nausea and vomiting.   Genitourinary: Negative for dysuria, flank pain and urgency.   Skin: Negative for rash.   Neurological: Negative for dizziness, light-headedness and numbness.   Psychiatric/Behavioral: Negative for confusion.     Objective:     Vital Signs (Most Recent):  Temp: 98.2 °F (36.8 °C) (02/23/18 0733)  Pulse: 78 (02/23/18 0900)  Resp: 20 (02/23/18 0349)  BP: 131/78 (02/23/18 0900)  SpO2: 96 % (02/23/18 0349) Vital Signs (24h Range):  Temp:  [98.2 °F (36.8 °C)-99 °F (37.2 °C)] 98.2 °F (36.8 °C)  Pulse:  [73-90] 78  Resp:  [16-20] 20  SpO2:  [96 %-98 %] 96 %  BP: (122-135)/(70-84) 131/78     Weight: 64 kg (141 lb 1.5 oz)  Body mass index is 22.77 kg/m².    Estimated Creatinine Clearance: 21.6 mL/min (A) (based on SCr of 4.6 mg/dL (H)).    Physical Exam   Constitutional: He is oriented to person, place, and time.   HENT:   Mouth/Throat: No oropharyngeal exudate.   Eyes: No scleral icterus.   Cardiovascular: Normal rate and regular rhythm.    Pulmonary/Chest: Effort normal and breath sounds normal. No respiratory distress. He has no wheezes. He has no rales.   Abdominal: Soft. There is tenderness. There is no rebound.   Musculoskeletal: He exhibits edema.   Lymphadenopathy:     He has no cervical adenopathy.   Neurological: He is alert and oriented to person, place, and time.   Skin: He is not diaphoretic.       Significant Labs:   Bilirubin:     Recent Labs  Lab 02/19/18  0571  02/20/18  0412 02/21/18  0437 02/22/18  0547 02/23/18  0628   BILITOT 2.1* 1.8* 2.0* 2.1* 2.1*     Blood Culture:     Recent Labs  Lab 01/30/18  1916 02/11/18  1236 02/11/18  1241 02/15/18  1729 02/21/18  0001   LABBLOO No growth after 5 days.  No growth after 5 days. No growth after 5 days. No growth after 5 days. No growth after 5 days.  No growth after 5 days. No Growth to date  No Growth to date  No Growth to date  No Growth to date  No Growth to date  No Growth to date     BMP:     Recent Labs  Lab 02/23/18  0628   GLU 64*      K 4.0      CO2 22*   BUN 15   CREATININE 4.6*   CALCIUM 8.2*   MG 1.9     CBC:     Recent Labs  Lab 02/22/18  0547 02/23/18  0628   WBC 2.35* 2.53*   HGB 7.4* 7.8*   HCT 22.5* 24.3*   * 152     CMP:     Recent Labs  Lab 02/22/18  0547 02/23/18  0628    137   K 3.7 4.0    104   CO2 23 22*   GLU 71 64*   BUN 10 15   CREATININE 3.4* 4.6*   CALCIUM 8.4* 8.2*   PROT 4.9* 5.0*   ALBUMIN 1.8* 1.7*   BILITOT 2.1* 2.1*   ALKPHOS 88 86   AST 21 20   ALT <5* <5*   ANIONGAP 10 11   EGFRNONAA 23.2* 16.1*     Microbiology Results (last 7 days)     Procedure Component Value Units Date/Time    Culture, Anaerobic [[id number]] Collected:  02/16/18 1411    Order Status:  Completed Specimen:  Body Fluid from Pleural Fluid Updated:  02/23/18 0951     Anaerobic Culture No anaerobes isolated    AFB Culture & Smear [[id number]] Collected:  01/11/18 1335    Order Status:  Completed Specimen:  Pleural Fluid from Lung, Left Updated:  02/23/18 0927     AFB Culture & Smear Culture in progress     AFB Culture & Smear Culture in progress     AFB CULTURE STAIN No acid fast bacilli seen.    Blood culture [[id number]] Collected:  02/21/18 0001    Order Status:  Completed Specimen:  Blood Updated:  02/23/18 0612     Blood Culture, Routine No Growth to date     Blood Culture, Routine No Growth to date     Blood Culture, Routine No Growth to date    Blood culture [[id number]] Collected:   02/21/18 0001    Order Status:  Completed Specimen:  Blood Updated:  02/23/18 0612     Blood Culture, Routine No Growth to date     Blood Culture, Routine No Growth to date     Blood Culture, Routine No Growth to date    Fungus culture [[id number]] Collected:  02/07/18 1452    Order Status:  Completed Specimen:  Body Fluid from Peritoneal Fluid Updated:  02/22/18 0810     Fungus (Mycology) Culture Culture in progress     Fungus (Mycology) Culture No fungus isolated after 2 weeks    Fungus culture [[id number]] Collected:  02/16/18 1411    Order Status:  Completed Specimen:  Body Fluid from Pleural Fluid Updated:  02/22/18 0717     Fungus (Mycology) Culture Culture in progress    Narrative:       Right    Blood culture [[id number]] Collected:  02/15/18 1729    Order Status:  Completed Specimen:  Blood Updated:  02/20/18 2012     Blood Culture, Routine No growth after 5 days.    Narrative:       Collection has been rescheduled by SAN1 at 2/15/2018 16:57 Reason:   Patient getting procedure; doctor asked for lab to return later.  Collection has been rescheduled by SAN1 at 2/15/2018 16:57 Reason:   Patient getting procedure; doctor asked for lab to return later.    Blood culture [[id number]] Collected:  02/15/18 1729    Order Status:  Completed Specimen:  Blood Updated:  02/20/18 2012     Blood Culture, Routine No growth after 5 days.    Narrative:       Collection has been rescheduled by SAN1 at 2/15/2018 16:57 Reason:   Patient getting procedure; doctor asked for lab to return later.  Collection has been rescheduled by SAN1 at 2/15/2018 16:57 Reason:   Patient getting procedure; doctor asked for lab to return later.    Fungus culture [[id number]] Collected:  01/19/18 1101    Order Status:  Completed Specimen:  Abdominal from Ascites Updated:  02/20/18 1357     Fungus (Mycology) Culture No fungus isolated after 4 weeks    Fungus culture [[id number]] Collected:  01/19/18 1140    Order Status:  Completed Specimen:  Pleural Fluid  from Pleural Fluid Updated:  02/20/18 1357     Fungus (Mycology) Culture No fungus isolated after 4 weeks    Culture, Body Fluid (Aerobic) w/ GS [[id number]] Collected:  02/16/18 1411    Order Status:  Completed Specimen:  Body Fluid from Pleural Fluid Updated:  02/19/18 1053     AEROBIC CULTURE - FLUID No growth     Gram Stain Result Rare WBC's      No organisms seen    Blood culture [[id number]] Collected:  02/11/18 1241    Order Status:  Completed Specimen:  Blood Updated:  02/16/18 1412     Blood Culture, Routine No growth after 5 days.    Narrative:       Collection has been rescheduled by TRM at 2/11/2018 11:34 Reason: due   now  Collection has been rescheduled by TRM at 2/11/2018 11:34 Reason: due   now    Blood culture [[id number]] Collected:  02/11/18 1236    Order Status:  Completed Specimen:  Blood Updated:  02/16/18 1412     Blood Culture, Routine No growth after 5 days.    Narrative:       Collection has been rescheduled by TRM at 2/11/2018 11:34 Reason: due   now  Collection has been rescheduled by TRM at 2/11/2018 11:34 Reason: due   now          Significant Imaging: I have reviewed all pertinent imaging results/findings within the past 24 hours.

## 2018-02-24 NOTE — ASSESSMENT & PLAN NOTE
- detected at 370 on CMV PCR 2/1.  - case discussed with ID and will hold off on treatment at this time given low WBC.   - undetected CMV PCR 2/8.  - continue Valcyte 200 mg every Mon, Wed, Fri.  - CMV PCR 2/15 negative  - Per ID, valcyte dose decreased to ppx dose on 2/18.  - CMV PCR 2/22 negative

## 2018-02-24 NOTE — ASSESSMENT & PLAN NOTE
- Post op course complicated by fevers and hyperbilirubinemia.  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  - AST/ALT normal. Tbili/alkphos remain elevated.  - Liver US 1/8 showed 3.9 cm complex fluid collection anterior to right lobe and moderate nonspecific complex ascites inferior to transplant.   - IR placed drain 1/11/18, cell count negative for infection.  - PBS consulted. PBS not comfortable proceeding with EUS with liver biopsy given fever and possible infectious process going on. ERCP 1/17 with stone and sludge. Pt placed on Actigall - treated with 10 day course of abx.  ID now signed off.  - Bili elevated - liver u/s 1/28  - Liver biopsy 1/23 without rejection.  - consulted PBS for ERCP - Choledocholithiasis was found along with sludge, stents placed 1/30.  - ERCP 2/19, overall unremarkable findings. Did place new larger stents. Will need repeat ERCP in 10 weeks. Bilirubin stuck at 2

## 2018-02-24 NOTE — PLAN OF CARE
Problem: Patient Care Overview  Goal: Plan of Care Review  Outcome: Ongoing (interventions implemented as appropriate)  Pt reports lower abdominal pain 6/10 relieved by PRN Oxycodone. Pt ate 1 hamburger but became nauseated shortly thereafter and was unable to keep food down. PRN Zofran administered which provided relief. Abx treatment ongoing. 1 small BM reported this shift. Pt turns self in bed independently no signs of skin breakdown noted. This RN instructed the pt on the importance of hand hygiene, pt verbalized understanding. Afebrile overnight. Fall precautions in place, side rails upx3, non slip footwear applied, pt instructed to call for assistance before attempting to ambulate, pt verbalized understanding. Call bell within reach, family present at bedside.

## 2018-02-24 NOTE — PLAN OF CARE
Problem: Patient Care Overview  Goal: Plan of Care Review  Outcome: Ongoing (interventions implemented as appropriate)  Pt aao x4. Call bell within reach. Mother at bedside. Pt is up independently. Pt ambulates small distances before getting his mother to push him in wheelchair. He leaves floor for outings for 1-2 hours at a time. rij tlc placed today to start tpn tonight. Pt still with poor intake. Will continue to monitor.

## 2018-02-24 NOTE — ASSESSMENT & PLAN NOTE
- h/o constipation, reports having regular BM's with bowel regimen.  - vomiting episode x 1 at home. Anti-emetics ordered PRN.  - KUB repeated 1/14/18 given increased abdominal pain- mild, generalized bowel distention suggesting ileus.  Diet changed to clears as tolerated for bowel rest .  - SHANE tube placed 1/16 for tube feeds. Not tolerating tube feeds, stopped 1/17/18.  - D/C shane 1/19, now in place as of 1/25.  - chronic constipation, Cont bowel regimen.   - See protein calorie malnutrition.  - TF have been on hold since 2/20 for N/V.   - D/C shane 2/23 as not tolerating TF  - 2/24 changes: Increase Reglan to 10 mg, D/C atovaquone, transitioned cyclo to Prograf, change twyla to BID, check Keppra level  - Repeat EGD this week if nausea does not improve

## 2018-02-24 NOTE — SUBJECTIVE & OBJECTIVE
Scheduled Meds:   bisacodyl  10 mg Oral Daily    docusate sodium  100 mg Oral BID    dronabinol  2.5 mg Oral Daily    epoetin maurisio (PROCRIT) injection  10,000 Units Intravenous Every Mon, Wed, Fri    ertapenem (INVANZ) IVPB  500 mg Intravenous Q24H    fat emulsion 20%  250 mL Intravenous Daily    heparin (porcine)  5,000 Units Subcutaneous Q8H    custom IVPB builder   Intravenous Q24H    levetiracetam oral soln  500 mg Oral BID    levothyroxine  75 mcg Oral Before breakfast    metoclopramide HCl  10 mg Oral QID (AC & HS)    multivitamin  1 tablet Oral Daily    omeprazole  40 mg Oral QAM    polyethylene glycol  17 g Oral BID    psyllium husk (aspartame)  3.4 g Oral BID    tacrolimus  1 mg Oral BID    ursodiol  300 mg Oral BID    valganciclovir 50 mg/ml  100 mg Oral Every Mon, Wed, Fri     Continuous Infusions:   TPN ADULT CENTRAL LINE CUSTOM       PRN Meds:sodium chloride, sodium chloride 0.9%, acetaminophen, albuterol-ipratropium 2.5mg-0.5mg/3mL, bisacodyl, dextrose 50%, dextrose 50%, glucagon (human recombinant), glucose, glucose, heparin (porcine), ondansetron, ondansetron, oxyCODONE, prochlorperazine, simethicone, sodium chloride 0.9%    Review of Systems   Constitutional: Positive for activity change, appetite change and fatigue. Negative for chills and fever.   HENT: Negative.  Negative for congestion and facial swelling.    Eyes: Negative for pain, discharge and visual disturbance.   Respiratory: Negative for cough, chest tightness, shortness of breath and wheezing.    Cardiovascular: Negative for chest pain, palpitations and leg swelling.   Gastrointestinal: Positive for abdominal distention, abdominal pain and nausea. Negative for constipation.   Endocrine: Negative.    Genitourinary: Positive for decreased urine volume. Negative for difficulty urinating.        Anuric   Musculoskeletal: Negative for arthralgias, back pain and myalgias.   Skin: Positive for color change and wound. Negative  for rash.   Allergic/Immunologic: Positive for immunocompromised state.   Neurological: Negative for dizziness, seizures, weakness and headaches.   Psychiatric/Behavioral: Positive for decreased concentration and dysphoric mood. Negative for confusion. The patient is not nervous/anxious.    All other systems reviewed and are negative.    Objective:     Vital Signs (Most Recent):  Temp: 98.6 °F (37 °C) (02/24/18 1112)  Pulse: 80 (02/24/18 1112)  Resp: 15 (02/24/18 1112)  BP: 129/80 (02/24/18 1112)  SpO2: 96 % (02/24/18 1112) Vital Signs (24h Range):  Temp:  [98.6 °F (37 °C)-99.8 °F (37.7 °C)] 98.6 °F (37 °C)  Pulse:  [75-97] 80  Resp:  [15-18] 15  SpO2:  [95 %-98 %] 96 %  BP: (117-132)/(67-80) 129/80     Weight: 70.1 kg (154 lb 8.7 oz)  Body mass index is 24.94 kg/m².    Intake/Output - Last 3 Shifts       02/22 0700 - 02/23 0659 02/23 0700 - 02/24 0659 02/24 0700 - 02/25 0659    P.O. 150 1260 180    Other  650     NG/      IV Piggyback 450 100     Total Intake(mL/kg) 720 (11.3) 2010 (28.7) 180 (2.6)    Urine (mL/kg/hr)  0 (0)     Emesis/NG output       Other  2650 (1.6)     Stool  0 (0)     Blood       Total Output   2650      Net +720 -640 +180           Urine Occurrence 0 x      Stool Occurrence 2 x 1 x     Emesis Occurrence 1 x            Physical Exam   Constitutional: He is oriented to person, place, and time. He appears well-developed. No distress.   Temporal and distal extremity muscle wasting   HENT:   Head: Normocephalic and atraumatic.   Mouth/Throat: No oropharyngeal exudate.   dylan tube   Eyes: EOM are normal. Pupils are equal, round, and reactive to light. Scleral icterus is present.   Neck: Normal range of motion. Neck supple. No JVD present. No thyromegaly present.   Cardiovascular: Normal rate, regular rhythm, normal heart sounds and intact distal pulses.    No murmur heard.  Pulmonary/Chest: Effort normal. No respiratory distress. He has decreased breath sounds in the right middle field, the  right lower field and the left lower field. He has no wheezes. He exhibits no tenderness.   Diminished to RLL   Abdominal: Soft. Bowel sounds are normal. He exhibits ascites. He exhibits no distension. There is tenderness. There is no rebound and no guarding.   Dressing to chevron.  Wd vac removed 1/12/18  Dependent edema present R flank; surrounding erythema   Musculoskeletal: Normal range of motion. He exhibits edema (2+ LE edema). He exhibits no tenderness.   Neurological: He is alert and oriented to person, place, and time. He has normal reflexes.   Skin: Skin is warm and dry. Capillary refill takes 2 to 3 seconds. He is not diaphoretic. No erythema.   jaundice   Psychiatric: He has a normal mood and affect. His behavior is normal. Judgment and thought content normal. His mood appears not anxious.   Depressed mood   Nursing note and vitals reviewed.      Laboratory:  Immunosuppressants         Stop Route Frequency     tacrolimus capsule 1 mg      -- Oral 2 times daily        CBC:     Recent Labs  Lab 02/24/18  0507   WBC 2.22*   RBC 2.39*   HGB 7.4*   HCT 23.3*      MCV 98   MCH 31.0   MCHC 31.8*     CMP:     Recent Labs  Lab 02/24/18  0506   GLU 71   CALCIUM 8.8   ALBUMIN 1.8*   PROT 5.0*      K 3.7   CO2 25      BUN 7   CREATININE 3.2*   ALKPHOS 89   ALT <5*   AST 18   BILITOT 1.9*     Labs within the past 24 hours have been reviewed.    Diagnostic Results:  pertinent imaging reviewed

## 2018-02-24 NOTE — ASSESSMENT & PLAN NOTE
- Poor PO intake since transplant requiring short course of TPN during previous hospital stay.   - albumin remains decreased but appetite slowly improving per pt.   - Dietary consulted. Will need to closely monitor PO intake.   - supplements also ordered.   - SHANE with tube feeding start 1/16. Stopped 1/17/18, was not tolerating. Shane removed 1/20.  - prealbumin 7 on 1/15.  - TPN started 1/16/18.   - Pt able to eat minimally 1/21 and 1/22 which is an improvement.  - gi consulted - EGD reviewed, increased ppi bid, miralax bid, and metamucil bid, decreased narcotics.  - continue reglan, placed SHANE tube with tube feeds - transitioned to nightly feeds. Not tolerating well due to nausea.   - small bowel follow through with normal findings.   - consulted gi for possible gj tube placement, holding off on this for now as with ongoing fevers.  -Dietary changed tube feeds back to 24 hours from nightly 2/19 as not eating enough during the day to meet caloric needs. TF on hold since 2/20.  - retrial marinol 2/22 as pt reports that's the only thing helping his appetite, in the evening 2/2 concern for possible drowsiness  - pt feels marinol is helping  - Pt vomited 2/23 after trying hamburger  - Start TPN tonight

## 2018-02-25 PROBLEM — D70.8 OTHER NEUTROPENIA: Status: ACTIVE | Noted: 2018-02-25

## 2018-02-25 LAB
ALBUMIN SERPL BCP-MCNC: 1.7 G/DL
ALP SERPL-CCNC: 88 U/L
ALT SERPL W/O P-5'-P-CCNC: <5 U/L
ANION GAP SERPL CALC-SCNC: 8 MMOL/L
ANISOCYTOSIS BLD QL SMEAR: SLIGHT
AST SERPL-CCNC: 20 U/L
BASOPHILS # BLD AUTO: ABNORMAL K/UL
BASOPHILS NFR BLD: 2 %
BILIRUB SERPL-MCNC: 1.6 MG/DL
BLD GP AB SCN CELLS X3 SERPL QL: NORMAL
BUN SERPL-MCNC: 12 MG/DL
CALCIUM SERPL-MCNC: 8.7 MG/DL
CHLORIDE SERPL-SCNC: 106 MMOL/L
CO2 SERPL-SCNC: 24 MMOL/L
CREAT SERPL-MCNC: 4.2 MG/DL
CYCLOSPORINE BLD LC/MS/MS-MCNC: 133 NG/ML
DACRYOCYTES BLD QL SMEAR: ABNORMAL
DAT IGG-SP REAG RBC-IMP: NORMAL
DIFFERENTIAL METHOD: ABNORMAL
EOSINOPHIL # BLD AUTO: ABNORMAL K/UL
EOSINOPHIL NFR BLD: 0 %
ERYTHROCYTE [DISTWIDTH] IN BLOOD BY AUTOMATED COUNT: 16.5 %
EST. GFR  (AFRICAN AMERICAN): 20.8 ML/MIN/1.73 M^2
EST. GFR  (NON AFRICAN AMERICAN): 18 ML/MIN/1.73 M^2
GLUCOSE SERPL-MCNC: 97 MG/DL
HAPTOGLOB SERPL-MCNC: 28 MG/DL
HCT VFR BLD AUTO: 21.7 %
HGB BLD-MCNC: 7.2 G/DL
HYPOCHROMIA BLD QL SMEAR: ABNORMAL
IMM GRANULOCYTES # BLD AUTO: ABNORMAL K/UL
IMM GRANULOCYTES NFR BLD AUTO: ABNORMAL %
LDH SERPL L TO P-CCNC: 345 U/L
LYMPHOCYTES # BLD AUTO: ABNORMAL K/UL
LYMPHOCYTES NFR BLD: 44 %
MAGNESIUM SERPL-MCNC: 1.7 MG/DL
MCH RBC QN AUTO: 32.1 PG
MCHC RBC AUTO-ENTMCNC: 33.2 G/DL
MCV RBC AUTO: 97 FL
MONOCYTES # BLD AUTO: ABNORMAL K/UL
MONOCYTES NFR BLD: 8 %
NEUTROPHILS NFR BLD: 46 %
NRBC BLD-RTO: 0 /100 WBC
OVALOCYTES BLD QL SMEAR: ABNORMAL
PHOSPHATE SERPL-MCNC: 3.1 MG/DL
PLATELET # BLD AUTO: 152 K/UL
PLATELET BLD QL SMEAR: ABNORMAL
PMV BLD AUTO: 10 FL
POIKILOCYTOSIS BLD QL SMEAR: SLIGHT
POLYCHROMASIA BLD QL SMEAR: ABNORMAL
POTASSIUM SERPL-SCNC: 3.7 MMOL/L
PROT SERPL-MCNC: 4.9 G/DL
RBC # BLD AUTO: 2.24 M/UL
SCHISTOCYTES BLD QL SMEAR: PRESENT
SODIUM SERPL-SCNC: 138 MMOL/L
T4 FREE SERPL-MCNC: 0.88 NG/DL
TACROLIMUS BLD-MCNC: 4 NG/ML
TACROLIMUS BLD-MCNC: 4 NG/ML
WBC # BLD AUTO: 1.93 K/UL

## 2018-02-25 PROCEDURE — 63600175 PHARM REV CODE 636 W HCPCS: Performed by: PHYSICIAN ASSISTANT

## 2018-02-25 PROCEDURE — 36415 COLL VENOUS BLD VENIPUNCTURE: CPT

## 2018-02-25 PROCEDURE — 84100 ASSAY OF PHOSPHORUS: CPT

## 2018-02-25 PROCEDURE — 80053 COMPREHEN METABOLIC PANEL: CPT

## 2018-02-25 PROCEDURE — 85027 COMPLETE CBC AUTOMATED: CPT

## 2018-02-25 PROCEDURE — 63600175 PHARM REV CODE 636 W HCPCS: Mod: JG | Performed by: SURGERY

## 2018-02-25 PROCEDURE — 83010 ASSAY OF HAPTOGLOBIN QUANT: CPT

## 2018-02-25 PROCEDURE — 25000003 PHARM REV CODE 250: Performed by: PHYSICIAN ASSISTANT

## 2018-02-25 PROCEDURE — 99233 SBSQ HOSP IP/OBS HIGH 50: CPT | Mod: ,,, | Performed by: PHYSICIAN ASSISTANT

## 2018-02-25 PROCEDURE — 94664 DEMO&/EVAL PT USE INHALER: CPT

## 2018-02-25 PROCEDURE — 25000003 PHARM REV CODE 250: Performed by: NURSE PRACTITIONER

## 2018-02-25 PROCEDURE — 84439 ASSAY OF FREE THYROXINE: CPT

## 2018-02-25 PROCEDURE — 86850 RBC ANTIBODY SCREEN: CPT

## 2018-02-25 PROCEDURE — 86880 COOMBS TEST DIRECT: CPT

## 2018-02-25 PROCEDURE — 63600175 PHARM REV CODE 636 W HCPCS: Performed by: NURSE PRACTITIONER

## 2018-02-25 PROCEDURE — 25000003 PHARM REV CODE 250: Performed by: SURGERY

## 2018-02-25 PROCEDURE — 83615 LACTATE (LD) (LDH) ENZYME: CPT

## 2018-02-25 PROCEDURE — 63600175 PHARM REV CODE 636 W HCPCS: Mod: JG | Performed by: PHYSICIAN ASSISTANT

## 2018-02-25 PROCEDURE — B4185 PARENTERAL SOL 10 GM LIPIDS: HCPCS | Performed by: PHYSICIAN ASSISTANT

## 2018-02-25 PROCEDURE — 20600001 HC STEP DOWN PRIVATE ROOM

## 2018-02-25 PROCEDURE — 80158 DRUG ASSAY CYCLOSPORINE: CPT

## 2018-02-25 PROCEDURE — 86860 RBC ANTIBODY ELUTION: CPT

## 2018-02-25 PROCEDURE — 83735 ASSAY OF MAGNESIUM: CPT

## 2018-02-25 PROCEDURE — 87799 DETECT AGENT NOS DNA QUANT: CPT

## 2018-02-25 PROCEDURE — 85007 BL SMEAR W/DIFF WBC COUNT: CPT

## 2018-02-25 PROCEDURE — 80197 ASSAY OF TACROLIMUS: CPT

## 2018-02-25 PROCEDURE — A4217 STERILE WATER/SALINE, 500 ML: HCPCS | Performed by: PHYSICIAN ASSISTANT

## 2018-02-25 RX ORDER — SODIUM CHLORIDE 9 MG/ML
INJECTION, SOLUTION INTRAVENOUS
Status: DISCONTINUED | OUTPATIENT
Start: 2018-02-26 | End: 2018-02-27

## 2018-02-25 RX ORDER — SODIUM CHLORIDE 9 MG/ML
INJECTION, SOLUTION INTRAVENOUS ONCE
Status: COMPLETED | OUTPATIENT
Start: 2018-02-26 | End: 2018-02-26

## 2018-02-25 RX ADMIN — URSODIOL 300 MG: 300 CAPSULE ORAL at 09:02

## 2018-02-25 RX ADMIN — ONDANSETRON HYDROCHLORIDE 4 MG: 2 INJECTION, SOLUTION INTRAMUSCULAR; INTRAVENOUS at 09:02

## 2018-02-25 RX ADMIN — TACROLIMUS 1 MG: 1 CAPSULE ORAL at 07:02

## 2018-02-25 RX ADMIN — OXYCODONE HYDROCHLORIDE 10 MG: 5 TABLET ORAL at 02:02

## 2018-02-25 RX ADMIN — LEVETIRACETAM 500 MG: 100 SOLUTION ORAL at 09:02

## 2018-02-25 RX ADMIN — BISACODYL 10 MG: 5 TABLET, COATED ORAL at 09:02

## 2018-02-25 RX ADMIN — SODIUM CHLORIDE 0.5 G: 0.9 INJECTION, SOLUTION INTRAVENOUS at 09:02

## 2018-02-25 RX ADMIN — DRONABINOL 2.5 MG: 2.5 CAPSULE ORAL at 05:02

## 2018-02-25 RX ADMIN — ONDANSETRON HYDROCHLORIDE 4 MG: 2 INJECTION, SOLUTION INTRAMUSCULAR; INTRAVENOUS at 02:02

## 2018-02-25 RX ADMIN — SODIUM CHLORIDE: 9 INJECTION, SOLUTION INTRAVENOUS at 12:02

## 2018-02-25 RX ADMIN — SODIUM CHLORIDE: 234 INJECTION INTRAMUSCULAR; INTRAVENOUS; SUBCUTANEOUS at 10:02

## 2018-02-25 RX ADMIN — METOCLOPRAMIDE HYDROCHLORIDE 10 MG: 5 SOLUTION ORAL at 05:02

## 2018-02-25 RX ADMIN — OMEPRAZOLE 40 MG: 40 CAPSULE, DELAYED RELEASE ORAL at 05:02

## 2018-02-25 RX ADMIN — METOCLOPRAMIDE HYDROCHLORIDE 10 MG: 5 SOLUTION ORAL at 09:02

## 2018-02-25 RX ADMIN — TBO-FILGRASTIM 480 MCG: 480 INJECTION, SOLUTION SUBCUTANEOUS at 12:02

## 2018-02-25 RX ADMIN — OXYCODONE HYDROCHLORIDE 10 MG: 5 TABLET ORAL at 07:02

## 2018-02-25 RX ADMIN — METOCLOPRAMIDE HYDROCHLORIDE 10 MG: 5 SOLUTION ORAL at 12:02

## 2018-02-25 RX ADMIN — TACROLIMUS 1 MG: 1 CAPSULE ORAL at 05:02

## 2018-02-25 RX ADMIN — LEVOTHYROXINE SODIUM 75 MCG: 75 TABLET ORAL at 05:02

## 2018-02-25 RX ADMIN — OXYCODONE HYDROCHLORIDE 10 MG: 5 TABLET ORAL at 09:02

## 2018-02-25 RX ADMIN — SOYBEAN OIL 250 ML: 20 INJECTION, SOLUTION INTRAVENOUS at 10:02

## 2018-02-25 RX ADMIN — THERA TABS 1 TABLET: TAB at 09:02

## 2018-02-25 NOTE — PROGRESS NOTES
- Received call from Lab regarding critical WBC count this morning of 1.93.  - Did not notify Resident as pt's WBCs have been low for a while now and this has been addressed in recent provider notes - pt's CellCept is on hold due to neutropenia. Pt is on a minimal dose of Prograf. Pt's next dose of Valcyte will be Monday afternoon after dialysis. Pt is allergic to Bactrim.    Results for RAFFY THOMAS (MRN [id number]) as of 2/25/2018 04:19   Ref. Range 2/25/2018 03:49   WBC Latest Ref Range: 3.90 - 12.70 K/uL 1.93 (LL)

## 2018-02-25 NOTE — PROGRESS NOTES
[facility]  Liver Transplant  Progress Note    Patient Name: Jhonny Diana  MRN: [id number]  Admission Date: 2018  Hospital Length of Stay: 45 days  Code Status: Full Code  Primary Care Provider: Primary Doctor No  Post-Operative Day: 129    ORGAN:   LIVER  Disease Etiology: Acute Alcoholic Hepatitis  Donor Type:    - Brain Death  CDC High Risk:   No  Donor CMV Status:   Donor CMV Status: Positive  Donor HBcAB:   Negative  Donor HCV Status:   Negative  Whole or Partial: Whole Liver  Biliary Anastomosis: End to End  Arterial Anatomy: Standard  Subjective:     History of Present Illness:  Jhonny Diana is a 26 y/o male with past medical history of alcoholic cirrhosis.  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 Found to have peritonitis in November (WBC 5000, 75% PNM) neg for bile leak. He was treated initially with vanc/cefepime. Repeat cell counts  with some improvement (WBC 1400, 45% PNM). He has undergone multiple paracenteses as well as abscess drainage of perihepatic fluid collections and treated with antimicrobial therapy but no positive cultures. Of note, biliary stricture also identified and ERCP performed on 2017 with sphincterotomy and biliary stent placed. Liver tests still have not normalized despite intervention, bilirubin and AP remain elevated. Other pertinent PMH current wound vac in place 2/2 wound infection, malnutrition requiring TPN for short course and ongoing hypoalbuminemia, and seizure activity while on prograf and has since been switched to cyclosporine without reoccurrence.  He presented to the ER for fever, abdominal pain, and N/V. He reports fever (103) for 1  day prior. Overnight, he developed N/V, reports small amount of green emesis with new left sided pain. He also endorses worsening SOB with exertion. He was scheduled as an outpatient for follow up paracentesis and IR drainage of fluid collection. CXR in ER shows large pleural effusion with subsegmental atelectasis. Infectious work up initiated in ER. His ANC is 900. Broad spectrum antibiotics initiated in ED. He denies chest pain, palpitations, diarrhea, constipation, or back pain. Denies any sick contacts.    Hospital Course:  Thoracentesis (1.2L off), Paracentesis (1.6L off), and IR drainage of fluid collection 1/11, all fluids negative for infection. ID consulted. Broad spectrum antibiotics d/c'd 1/15. Chronically malnourished with poor PO intake, prealbumin 7. Shane tube placed 1/15 for tube feedings.     ERCP 1/17 with sludge and a biliary stone which was removed and stent exchanged.  Remained with n/v; therefore, TF remained on hold and TPN continued.   Pt with fever s/p ERCP on 1/17 which continued until 1/19.  Vanc/cefepime restarted.  Blood cx 1/17 and 1/18 NGTD.  ID reconsulted.  Fungal markers sent.  CT C/A/P obtained.  With large R pleural effusion and ascites- both drained 1/19 and negative for infx per cell count. Pt afebrile 1/20. Liver biopsy 1/23 - without rejection.  EGD 1/25 - unremarkable for source of GI symptoms.  Resume diet along with other GI recommendations. SHANE tube placed for tube feeds.       2/22: Pt again spiked temp of 101 overnight, now down to 99. Resent Bcx overnight, prelim NGTD. Transitioned to Erta from vanc/zosyn per ID. Planning for tagged WBC scan on Monday. R sided abdominal erythema/dependent edema improving today.  TF have been on hold since 2/20. Will continue to hold today per pt request. Pt working on diet. KUB yesterday with significant stool and gas. Declines colace, miralax, metamucil, but willing to take daily bisacodyl. Pt agreeable to suppository today. If  suppository not successful can give mag citrate through dylan tonight. Will also retrial Marinol tonight as pt reported improvement in appetite previously. Will monitor closely for sedation.     2/23:  2 small BM 2/22.  Tolerated HD.  Dylan removed.  Will attempt to eat this PM.      2/24:  Pt ate then vomited hamburger 2/23.  Will resume TPN.  D/C atovaquone, transition cyclo to Prograf, check Keppra level, increase Reglan to 0 mg, change twyla to BID for nausea.  Will give pentam today.    2/25:  TPN going well, pt ate 1/2 subway sandwhich without vomiting yesterday, will continue to monitor.  Plan to repeat EGD this week.  Pt with drop in H/h again and appears to be hemolyzing.  LDH elevated, hapto slightly low, LEE pending.  With neutropenia- Neupogen today.    Scheduled Meds:   bisacodyl  10 mg Oral Daily    docusate sodium  100 mg Oral BID    dronabinol  2.5 mg Oral Daily    epoetin maurisio (PROCRIT) injection  10,000 Units Intravenous Every Mon, Wed, Fri    ertapenem (INVANZ) IVPB  500 mg Intravenous Q24H    fat emulsion 20%  250 mL Intravenous Daily    heparin (porcine)  5,000 Units Subcutaneous Q8H    custom IVPB builder   Intravenous Q24H    levetiracetam oral soln  500 mg Oral BID    levothyroxine  75 mcg Oral Before breakfast    metoclopramide HCl  10 mg Oral QID (AC & HS)    multivitamin  1 tablet Oral Daily    omeprazole  40 mg Oral QAM    polyethylene glycol  17 g Oral BID    psyllium husk (aspartame)  3.4 g Oral BID    tacrolimus  1 mg Oral BID    ursodiol  300 mg Oral BID     Continuous Infusions:   TPN ADULT CENTRAL LINE CUSTOM 48 mL/hr at 02/24/18 2158    TPN ADULT CENTRAL LINE CUSTOM       PRN Meds:sodium chloride 0.9%, acetaminophen, albuterol-ipratropium 2.5mg-0.5mg/3mL, bisacodyl, dextrose 50%, dextrose 50%, glucagon (human recombinant), glucose, glucose, heparin (porcine), ondansetron, ondansetron, oxyCODONE, prochlorperazine, simethicone, sodium chloride 0.9%    Review of Systems    Constitutional: Positive for activity change, appetite change and fatigue. Negative for chills and fever.   HENT: Negative.  Negative for congestion and facial swelling.    Eyes: Negative for pain, discharge and visual disturbance.   Respiratory: Negative for cough, chest tightness, shortness of breath and wheezing.    Cardiovascular: Negative for chest pain, palpitations and leg swelling.   Gastrointestinal: Positive for abdominal distention, abdominal pain and nausea. Negative for constipation.   Endocrine: Negative.    Genitourinary: Positive for decreased urine volume. Negative for difficulty urinating.        Anuric   Musculoskeletal: Negative for arthralgias, back pain and myalgias.   Skin: Positive for color change and wound. Negative for rash.   Allergic/Immunologic: Positive for immunocompromised state.   Neurological: Negative for dizziness, seizures, weakness and headaches.   Psychiatric/Behavioral: Positive for decreased concentration and dysphoric mood. Negative for confusion. The patient is not nervous/anxious.    All other systems reviewed and are negative.    Objective:     Vital Signs (Most Recent):  Temp: 98.8 °F (37.1 °C) (02/25/18 1150)  Pulse: 79 (02/25/18 1150)  Resp: 18 (02/25/18 1150)  BP: 127/81 (02/25/18 1150)  SpO2: 97 % (02/25/18 1150) Vital Signs (24h Range):  Temp:  [98.3 °F (36.8 °C)-99.5 °F (37.5 °C)] 98.8 °F (37.1 °C)  Pulse:  [74-86] 79  Resp:  [16-18] 18  SpO2:  [95 %-100 %] 97 %  BP: (123-137)/(75-88) 127/81     Weight: 68.1 kg (150 lb 2.1 oz)  Body mass index is 24.23 kg/m².    Intake/Output - Last 3 Shifts       02/23 0700 - 02/24 0659 02/24 0700 - 02/25 0659 02/25 0700 - 02/26 0659    P.O. 1260 600 240    Other 650      NG/GT       IV Piggyback 100 350     TPN  587.4 216    Total Intake(mL/kg) 2010 (28.7) 1537.4 (22.6) 456 (6.7)    Urine (mL/kg/hr) 0 (0) 0 (0) 0 (0)    Emesis/NG output   0 (0)    Other 2650 (1.6)  0 (0)    Stool 0 (0) 0 (0) 0 (0)    Blood   0 (0)    Total  Output 2650 0 0    Net -640 +1537.4 +456           Urine Occurrence  0 x 0 x    Stool Occurrence 1 x 1 x 0 x    Emesis Occurrence   0 x          Physical Exam   Constitutional: He is oriented to person, place, and time. He appears well-developed. No distress.   Temporal and distal extremity muscle wasting   HENT:   Head: Normocephalic and atraumatic.   Mouth/Throat: No oropharyngeal exudate.   dylan tube   Eyes: EOM are normal. Pupils are equal, round, and reactive to light. Scleral icterus is present.   Neck: Normal range of motion. Neck supple. No JVD present. No thyromegaly present.   Cardiovascular: Normal rate, regular rhythm, normal heart sounds and intact distal pulses.    No murmur heard.  Pulmonary/Chest: Effort normal. No respiratory distress. He has decreased breath sounds in the right middle field, the right lower field and the left lower field. He has no wheezes. He exhibits no tenderness.   Diminished to RLL   Abdominal: Soft. Bowel sounds are normal. He exhibits ascites. He exhibits no distension. There is tenderness. There is no rebound and no guarding.   Dressing to chevron.  Wd vac removed 1/12/18  Dependent edema present R flank; surrounding erythema   Musculoskeletal: Normal range of motion. He exhibits edema (2+ LE edema). He exhibits no tenderness.   Neurological: He is alert and oriented to person, place, and time. He has normal reflexes.   Skin: Skin is warm and dry. Capillary refill takes 2 to 3 seconds. He is not diaphoretic. No erythema.   jaundice   Psychiatric: He has a normal mood and affect. His behavior is normal. Judgment and thought content normal. His mood appears not anxious.   Depressed mood   Nursing note and vitals reviewed.      Laboratory:  Immunosuppressants         Stop Route Frequency     tacrolimus capsule 1 mg      -- Oral 2 times daily        CBC:     Recent Labs  Lab 02/25/18  0349   WBC 1.93*   RBC 2.24*   HGB 7.2*   HCT 21.7*      MCV 97   MCH 32.1*   MCHC 33.2      CMP:     Recent Labs  Lab 02/25/18  0349   GLU 97   CALCIUM 8.7   ALBUMIN 1.7*   PROT 4.9*      K 3.7   CO2 24      BUN 12   CREATININE 4.2*   ALKPHOS 88   ALT <5*   AST 20   BILITOT 1.6*     Labs within the past 24 hours have been reviewed.    Diagnostic Results:  pertinent imaging reviewed    Assessment/Plan:     Liver transplanted    - Post op course complicated by fevers and hyperbilirubinemia.  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  - AST/ALT normal. Tbili/alkphos remain elevated.  - Liver US 1/8 showed 3.9 cm complex fluid collection anterior to right lobe and moderate nonspecific complex ascites inferior to transplant.   - IR placed drain 1/11/18, cell count negative for infection.  - PBS consulted. PBS not comfortable proceeding with EUS with liver biopsy given fever and possible infectious process going on. ERCP 1/17 with stone and sludge. Pt placed on Actigall - treated with 10 day course of abx.  ID now signed off.  - Bili elevated - liver u/s 1/28  - Liver biopsy 1/23 without rejection.  - consulted PBS for ERCP - Choledocholithiasis was found along with sludge, stents placed 1/30.  - ERCP 2/19, overall unremarkable findings. Did place new larger stents. Will need repeat ERCP in 10 weeks. Bilirubin improving        Long-term use of immunosuppressant medication    - Maintenance IS with cyclosporine -->transitioned to Prograf 2/24 today for continued nausea.  - MMF on hold for infections and neutropenia.   - Continue to check cyclosporine and Prograf level daily during transition. Assess for toxicity and adjust level as needed.        Prophylactic immunotherapy    - See long term use of immunosuppression.         At risk for opportunistic infections    - CMV detected 2/1 now on tx dose valcyte. CMV PCR 2/15 not detected. CMV PCR 2/22, negative   - Pentam given 2/24            Other cytomegaloviral diseases    - detected at 370 on CMV PCR 2/1.  - case discussed  with ID and will hold off on treatment at this time given low WBC.   - undetected CMV PCR 2/8.  - continue Valcyte 200 mg every Mon, Wed, Fri.  - CMV PCR 2/15 negative  - Per ID, valcyte dose decreased to ppx dose on 2/18.  - CMV PCR 2/22 negative  - Repeat scope this week as pt with leukopenia, h/o CMV, and burning pain despite negative PCR        Fever    - Started on vanc/cefepime for ERCP. Now transitioned to vanc/zosyn.  - Blood cultures sent, prelim NGTD. Repeat blood cultures and chest xray today 2/10.  - thoracentesis 2/1 - fluid negative for infection.  - atelectasis noted on ct scan - start breathing tx & CPT.  - now cmv positive - started treatment 2/5/18.  - taken to OR 2/7 for exp lap - fluid collections drained, cultures pending.  - Patient with low grade fever 2/15, 100.3.  -CT A/P 2/15 with no evidence of IA fluid infection. Did show R pleural effusion s/p thoracentesis, cell count negative for infection, cx pending.  -Repeat blood cx 2/15 prelim NGTD, CMV PCR 2/15 negative   Complaining of abdominal pain. Is tender in RUQ.  -Remains with fevers  - Bilirubin remains elevated. Concerning for cholangitis. Antibiotics broadened 2/17. ID re consulted.   - ERCP  2/19, overall unremarkable findings. Did place new larger stents. Will need repeat ERCP in 10 weeks. Bilirubin is stable post ERCP.  -Tmax 101 2/21 PM again ID following. Plan to transition vanc/zosyn to ertapenem 2/22 as no improvement noted.   - Has been afebrile since switch to Erta  - will obtain tagged WBC scan Monday per ID recs.             Constipation    - Enema ordered 1/20 and 1/21 with BM.  - Encourage ambulation.  - decrease narcotics.  - d/c metamucil and miralax bid as makes patient nauseated.  - increase reglan qid.  - small bowel follow through with normal findings.   - patient reports last BM 2/19   -schedule bisacodyl  -KUB 2/21 with significant amounts of stool and gas. Pt declines miralax, metamucil, colace.   -encouraged  suppository.  - Improved on KUB 2/23        Other ascites    - Paracentesis performed 1/11/18 with 1600 ml removed.    - fluid negative for infection.   - Repeat para 1/19 negative for infection per cell count.  - no fluid seen for paracentesis on 1/23.        Severe protein-calorie malnutrition    - Poor PO intake since transplant requiring short course of TPN during previous hospital stay.   - albumin remains decreased but appetite slowly improving per pt.   - Dietary consulted. Will need to closely monitor PO intake.   - supplements also ordered.   - SHANE with tube feeding start 1/16. Stopped 1/17/18, was not tolerating. [place] removed 1/20.  - prealbumin 7 on 1/15.  - TPN started 1/16/18.   - Pt able to eat minimally 1/21 and 1/22 which is an improvement.  - gi consulted - EGD reviewed, increased ppi bid, miralax bid, and metamucil bid, decreased narcotics.  - continue reglan, placed SHANE tube with tube feeds - transitioned to nightly feeds. Not tolerating well due to nausea.   - small bowel follow through with normal findings.   - consulted gi for possible gj tube placement, holding off on this for now as with ongoing fevers.  -Dietary changed tube feeds back to 24 hours from nightly 2/19 as not eating enough during the day to meet caloric needs. TF on hold since 2/20.  - retrial marinol 2/22 as pt reports that's the only thing helping his appetite, in the evening 2/2 concern for possible drowsiness  - pt feels marinol is helping  - Pt vomited 2/23 after trying hamburger  - Start TPN 2/24- continue  - Pt able to 1/4 sandwich without vomiting 2/25          Nausea and vomiting    - h/o constipation, reports having regular BM's with bowel regimen.  - vomiting episode x 1 at home. Anti-emetics ordered PRN.  - KUB repeated 1/14/18 given increased abdominal pain- mild, generalized bowel distention suggesting ileus.  Diet changed to clears as tolerated for bowel rest .  - SHANE tube placed 1/16 for tube feeds. Not tolerating  tube feeds, stopped 1/17/18.  - D/C dylan 1/19, now in place as of 1/25.  - chronic constipation, Cont bowel regimen.   - See protein calorie malnutrition.  - TF have been on hold since 2/20 for N/V.   - D/C dylan 2/23 as not tolerating TF  - 2/24 changes: Increased Reglan to 10 mg, D/C atovaquone, transitioned cyclo to Prograf, change twyla to BID, check Keppra level  - Repeat EGD this week as pt reports burning pain in stomach  - Nausea slightly improved today          Recurrent pleural effusion on right    - CXR in ER with large right pleural effusion with subjective c/o worsening SOB  - thoracentesis completed- 1200 ml removed.  SOB with significant improvement.   - Cell count reviewed and negative for infection.   - resp even and non labored.  O2 sat 94-98%.  - Repeat thora 1/19 negative for infection per cell count.  - Reaccumulating fluid on xray 1/21.  - thoracentesis 1/31 - 1.4L removed, negative for infection  - Reaccumulating fluid again on xray 2/10.   -Thoracentesis performed  2/16 with 1500 cc off, cell count neg for infection, cx no growth to date.         Biliary stricture of transplanted liver    - tbili with increase prompting ERCP 1/30 with stones and sludge, stents placed.  -  Repeat ERCP 2/19 as was having recurrent fevers with bilirubin stuck at 2.0-2.2 with overall unremarkable findings seen. Did place new larger stents. Will need repeat ERCP in 10 weeks.         Anemia of chronic disease    - H/H stable. Cont to monitor with daily cbc.         Delayed surgical wound healing    - wd vac removed 1/12/18.  Wound healing well. Aquacel AG and Mepilex border dressing applied via wound care recs.           Acute renal failure with tubular necrosis    - HD resumed on previous admission. Now anuric and dialyzes M-W-F.  - Nephrology following.    - ktm consulted for possible kidney transplant.        Neutropenia: Neupogen today    VTE Risk Mitigation         Ordered     heparin (porcine) injection 1,000  Units  As needed (PRN)     Route:  Intra-Catheter        02/05/18 0945     heparin (porcine) injection 5,000 Units  Every 8 hours     Route:  Subcutaneous        01/31/18 0957     Medium Risk of VTE  Once      01/11/18 0351     Place sequential compression device  Until discontinued      01/11/18 0351          The patients clinical status was discussed at multidisplinary rounds, involving transplant surgery, transplant medicine, pharmacy, nursing, nutrition, and social work    Discharge Planning:  Monitor kidney function --> may need outpt HD  Monitor HH needs vs rehab for deconditioned status      Geronimo Vera PA-C  Liver Transplant  [facility]

## 2018-02-25 NOTE — SUBJECTIVE & OBJECTIVE
Scheduled Meds:   bisacodyl  10 mg Oral Daily    docusate sodium  100 mg Oral BID    dronabinol  2.5 mg Oral Daily    epoetin maurisio (PROCRIT) injection  10,000 Units Intravenous Every Mon, Wed, Fri    ertapenem (INVANZ) IVPB  500 mg Intravenous Q24H    fat emulsion 20%  250 mL Intravenous Daily    heparin (porcine)  5,000 Units Subcutaneous Q8H    custom IVPB builder   Intravenous Q24H    levetiracetam oral soln  500 mg Oral BID    levothyroxine  75 mcg Oral Before breakfast    metoclopramide HCl  10 mg Oral QID (AC & HS)    multivitamin  1 tablet Oral Daily    omeprazole  40 mg Oral QAM    polyethylene glycol  17 g Oral BID    psyllium husk (aspartame)  3.4 g Oral BID    tacrolimus  1 mg Oral BID    ursodiol  300 mg Oral BID     Continuous Infusions:   TPN ADULT CENTRAL LINE CUSTOM 48 mL/hr at 02/24/18 2158    TPN ADULT CENTRAL LINE CUSTOM       PRN Meds:sodium chloride 0.9%, acetaminophen, albuterol-ipratropium 2.5mg-0.5mg/3mL, bisacodyl, dextrose 50%, dextrose 50%, glucagon (human recombinant), glucose, glucose, heparin (porcine), ondansetron, ondansetron, oxyCODONE, prochlorperazine, simethicone, sodium chloride 0.9%    Review of Systems   Constitutional: Positive for activity change, appetite change and fatigue. Negative for chills and fever.   HENT: Negative.  Negative for congestion and facial swelling.    Eyes: Negative for pain, discharge and visual disturbance.   Respiratory: Negative for cough, chest tightness, shortness of breath and wheezing.    Cardiovascular: Negative for chest pain, palpitations and leg swelling.   Gastrointestinal: Positive for abdominal distention, abdominal pain and nausea. Negative for constipation.   Endocrine: Negative.    Genitourinary: Positive for decreased urine volume. Negative for difficulty urinating.        Anuric   Musculoskeletal: Negative for arthralgias, back pain and myalgias.   Skin: Positive for color change and wound. Negative for rash.    Allergic/Immunologic: Positive for immunocompromised state.   Neurological: Negative for dizziness, seizures, weakness and headaches.   Psychiatric/Behavioral: Positive for decreased concentration and dysphoric mood. Negative for confusion. The patient is not nervous/anxious.    All other systems reviewed and are negative.    Objective:     Vital Signs (Most Recent):  Temp: 98.8 °F (37.1 °C) (02/25/18 1150)  Pulse: 79 (02/25/18 1150)  Resp: 18 (02/25/18 1150)  BP: 127/81 (02/25/18 1150)  SpO2: 97 % (02/25/18 1150) Vital Signs (24h Range):  Temp:  [98.3 °F (36.8 °C)-99.5 °F (37.5 °C)] 98.8 °F (37.1 °C)  Pulse:  [74-86] 79  Resp:  [16-18] 18  SpO2:  [95 %-100 %] 97 %  BP: (123-137)/(75-88) 127/81     Weight: 68.1 kg (150 lb 2.1 oz)  Body mass index is 24.23 kg/m².    Intake/Output - Last 3 Shifts       02/23 0700 - 02/24 0659 02/24 0700 - 02/25 0659 02/25 0700 - 02/26 0659    P.O. 1260 600 240    Other 650      NG/GT       IV Piggyback 100 350     TPN  587.4 216    Total Intake(mL/kg) 2010 (28.7) 1537.4 (22.6) 456 (6.7)    Urine (mL/kg/hr) 0 (0) 0 (0) 0 (0)    Emesis/NG output   0 (0)    Other 2650 (1.6)  0 (0)    Stool 0 (0) 0 (0) 0 (0)    Blood   0 (0)    Total Output 2650 0 0    Net -640 +1537.4 +456           Urine Occurrence  0 x 0 x    Stool Occurrence 1 x 1 x 0 x    Emesis Occurrence   0 x          Physical Exam   Constitutional: He is oriented to person, place, and time. He appears well-developed. No distress.   Temporal and distal extremity muscle wasting   HENT:   Head: Normocephalic and atraumatic.   Mouth/Throat: No oropharyngeal exudate.   dylan tube   Eyes: EOM are normal. Pupils are equal, round, and reactive to light. Scleral icterus is present.   Neck: Normal range of motion. Neck supple. No JVD present. No thyromegaly present.   Cardiovascular: Normal rate, regular rhythm, normal heart sounds and intact distal pulses.    No murmur heard.  Pulmonary/Chest: Effort normal. No respiratory distress. He  has decreased breath sounds in the right middle field, the right lower field and the left lower field. He has no wheezes. He exhibits no tenderness.   Diminished to RLL   Abdominal: Soft. Bowel sounds are normal. He exhibits ascites. He exhibits no distension. There is tenderness. There is no rebound and no guarding.   Dressing to chevron.  Wd vac removed 1/12/18  Dependent edema present R flank; surrounding erythema   Musculoskeletal: Normal range of motion. He exhibits edema (2+ LE edema). He exhibits no tenderness.   Neurological: He is alert and oriented to person, place, and time. He has normal reflexes.   Skin: Skin is warm and dry. Capillary refill takes 2 to 3 seconds. He is not diaphoretic. No erythema.   jaundice   Psychiatric: He has a normal mood and affect. His behavior is normal. Judgment and thought content normal. His mood appears not anxious.   Depressed mood   Nursing note and vitals reviewed.      Laboratory:  Immunosuppressants         Stop Route Frequency     tacrolimus capsule 1 mg      -- Oral 2 times daily        CBC:     Recent Labs  Lab 02/25/18  0349   WBC 1.93*   RBC 2.24*   HGB 7.2*   HCT 21.7*      MCV 97   MCH 32.1*   MCHC 33.2     CMP:     Recent Labs  Lab 02/25/18  0349   GLU 97   CALCIUM 8.7   ALBUMIN 1.7*   PROT 4.9*      K 3.7   CO2 24      BUN 12   CREATININE 4.2*   ALKPHOS 88   ALT <5*   AST 20   BILITOT 1.6*     Labs within the past 24 hours have been reviewed.    Diagnostic Results:  pertinent imaging reviewed

## 2018-02-25 NOTE — ASSESSMENT & PLAN NOTE
- Started on vanc/cefepime for ERCP. Now transitioned to vanc/zosyn.  - Blood cultures sent, prelim NGTD. Repeat blood cultures and chest xray today 2/10.  - thoracentesis 2/1 - fluid negative for infection.  - atelectasis noted on ct scan - start breathing tx & CPT.  - now cmv positive - started treatment 2/5/18.  - taken to OR 2/7 for exp lap - fluid collections drained, cultures pending.  - Patient with low grade fever 2/15, 100.3.  -CT A/P 2/15 with no evidence of IA fluid infection. Did show R pleural effusion s/p thoracentesis, cell count negative for infection, cx pending.  -Repeat blood cx 2/15 prelim NGTD, CMV PCR 2/15 negative   Complaining of abdominal pain. Is tender in RUQ.  -Remains with fevers  - Bilirubin remains elevated. Concerning for cholangitis. Antibiotics broadened 2/17. ID re consulted.   - ERCP  2/19, overall unremarkable findings. Did place new larger stents. Will need repeat ERCP in 10 weeks. Bilirubin is stable post ERCP.  -Tmax 101 2/21 PM again ID following. Plan to transition vanc/zosyn to ertapenem 2/22 as no improvement noted.   - Has been afebrile since switch to Erta  - will obtain tagged WBC scan Monday per ID recs.

## 2018-02-25 NOTE — ASSESSMENT & PLAN NOTE
- detected at 370 on CMV PCR 2/1.  - case discussed with ID and will hold off on treatment at this time given low WBC.   - undetected CMV PCR 2/8.  - continue Valcyte 200 mg every Mon, Wed, Fri.  - CMV PCR 2/15 negative  - Per ID, valcyte dose decreased to ppx dose on 2/18.  - CMV PCR 2/22 negative  - Repeat scope this week as pt with leukopenia, h/o CMV, and burning pain despite negative PCR

## 2018-02-25 NOTE — PLAN OF CARE
Problem: Patient Care Overview  Goal: Plan of Care Review  Outcome: Ongoing (interventions implemented as appropriate)  - Pt admitted 1/11/18 for nausea, vomiting, and fever; pt is a liver transplant recipient from 10/19/17 for alcoholic hepatitis.  - VS WNL overnight except for a low-grade temp of 99.5 at 0500 - pt feels quite warm to the touch - low-grade fevers have been an almost daily issue for this pt. Pt is on IV Ertapenem q24h and IV Cresemba q24h. Pt's CellCept is on hold due to neutropenia and presumed infection. WBCs 1.93 this morning.  - On Monday, pt will have a Nuclear Medicine inflammatory localization WB indium isotope test - due to continued low-grade temps. The purpose of the test is to identify the location of infection(s).  - Pt was restarted on TPN last night. Pt previously had a Keofeed tube with tube feeds. Pt had difficulty tolerating the tube feeds and they were d/c'ed 2/20. Pt then had only minimal PO intake for the next few days. Yesterday, pt's only intake was half of a six-inch [employer] sandwich.  - TPN is at 48 mL/hr to distal port of left IJ triple lumen. Nocturnal lipids Y-ported at 20.8 mL/hr.  - Pt is currently dialysis dependent due to ATN. Pt undergoes HD on a M/W/F schedule. Pt does occasionally produce small volumes (less than 100 mL/day) of urine but did not have any output overnight.  - Pain medication given once for abdominal pain. Pt's abdomen is quite distended which is likely a combination of malnutrition, ascites, and constipation/gas. Pt's chevron incision has an open area with a hydrocolloid dressing in place. Wound care following.  - Pt refuses Colace, Metamucil, Miralax, and heparin shots.  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 Pt is taking Keppra for seizure prophylaxis.  - Pt took a long walk before bedtime. Pt has mother at bedside. Will continue to  monitor.

## 2018-02-25 NOTE — ASSESSMENT & PLAN NOTE
- h/o constipation, reports having regular BM's with bowel regimen.  - vomiting episode x 1 at home. Anti-emetics ordered PRN.  - KUB repeated 1/14/18 given increased abdominal pain- mild, generalized bowel distention suggesting ileus.  Diet changed to clears as tolerated for bowel rest .  - SHANE tube placed 1/16 for tube feeds. Not tolerating tube feeds, stopped 1/17/18.  - D/C shane 1/19, now in place as of 1/25.  - chronic constipation, Cont bowel regimen.   - See protein calorie malnutrition.  - TF have been on hold since 2/20 for N/V.   - D/C shane 2/23 as not tolerating TF  - 2/24 changes: Increased Reglan to 10 mg, D/C atovaquone, transitioned cyclo to Prograf, change twyla to BID, check Keppra level  - Repeat EGD this week as pt reports burning pain in stomach  - Nausea slightly improved today

## 2018-02-25 NOTE — PROGRESS NOTES
- Called Lab regarding pt's Keppra (levetiracetam) level to be drawn tonight at 2000 (pt is now a nurse draw as he had a central line placed today) - wanted to clarify collection instructions as none are listed.  - Lab tech informed me that Keppra level is a sendout lab and sendout labs can only be collected Monday - Friday and that there is no way to store the sample until then (?).  - For now I have modified the test with a new due time of Monday 2/26 at 0800. Will pass this on to day RN for team to correct if this will be a problem.

## 2018-02-25 NOTE — ASSESSMENT & PLAN NOTE
- Post op course complicated by fevers and hyperbilirubinemia.  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  - AST/ALT normal. Tbili/alkphos remain elevated.  - Liver US 1/8 showed 3.9 cm complex fluid collection anterior to right lobe and moderate nonspecific complex ascites inferior to transplant.   - IR placed drain 1/11/18, cell count negative for infection.  - PBS consulted. PBS not comfortable proceeding with EUS with liver biopsy given fever and possible infectious process going on. ERCP 1/17 with stone and sludge. Pt placed on Actigall - treated with 10 day course of abx.  ID now signed off.  - Bili elevated - liver u/s 1/28  - Liver biopsy 1/23 without rejection.  - consulted PBS for ERCP - Choledocholithiasis was found along with sludge, stents placed 1/30.  - ERCP 2/19, overall unremarkable findings. Did place new larger stents. Will need repeat ERCP in 10 weeks. Bilirubin improving

## 2018-02-25 NOTE — NURSING
Left IJ placed earlier. Dr. Swain came to bedside to pull it back a little. Ok to use now. Will continue to monitor.

## 2018-02-25 NOTE — ASSESSMENT & PLAN NOTE
- Poor PO intake since transplant requiring short course of TPN during previous hospital stay.   - albumin remains decreased but appetite slowly improving per pt.   - Dietary consulted. Will need to closely monitor PO intake.   - supplements also ordered.   - SHANE with tube feeding start 1/16. Stopped 1/17/18, was not tolerating. Shane removed 1/20.  - prealbumin 7 on 1/15.  - TPN started 1/16/18.   - Pt able to eat minimally 1/21 and 1/22 which is an improvement.  - gi consulted - EGD reviewed, increased ppi bid, miralax bid, and metamucil bid, decreased narcotics.  - continue reglan, placed SHANE tube with tube feeds - transitioned to nightly feeds. Not tolerating well due to nausea.   - small bowel follow through with normal findings.   - consulted gi for possible gj tube placement, holding off on this for now as with ongoing fevers.  -Dietary changed tube feeds back to 24 hours from nightly 2/19 as not eating enough during the day to meet caloric needs. TF on hold since 2/20.  - retrial marinol 2/22 as pt reports that's the only thing helping his appetite, in the evening 2/2 concern for possible drowsiness  - pt feels marinol is helping  - Pt vomited 2/23 after trying hamburger  - Start TPN 2/24- continue  - Pt able to 1/4 sandwich without vomiting 2/25

## 2018-02-25 NOTE — ASSESSMENT & PLAN NOTE
- tbili with increase prompting ERCP 1/30 with stones and sludge, stents placed.  -  Repeat ERCP 2/19 as was having recurrent fevers with bilirubin stuck at 2.0-2.2 with overall unremarkable findings seen. Did place new larger stents. Will need repeat ERCP in 10 weeks.

## 2018-02-25 NOTE — ASSESSMENT & PLAN NOTE
- Maintenance IS with cyclosporine -->transitioned to Prograf 2/24 today for continued nausea.  - MMF on hold for infections and neutropenia.   - Continue to check cyclosporine and Prograf level daily during transition. Assess for toxicity and adjust level as needed.

## 2018-02-25 NOTE — PROCEDURES
Date: 2/24/2018  Procedure Performed: Central venous catheter placement  Catheter type: 7Fr triple lumen  Length: 20 cm  Laterality: left  Insertion site: IJ    Indications: Central venous access    Anesthesia: Local    Procedure in Detail:  After informed consent was obtained, time out was performed with all present in agreement. The patient was placed in Trendelenburg supine position with head turned to expose the insertion site. Ultrasound was used to identify the internal jugular vein and examine for location and patency. The patient was prepped and draped in the normal sterile fashion. Local anesthesia was administered to the insertion site. After visualization of the relevant structures, including the internal jugular vein and carotid artery, an appropriate site was selected, and the introducer needle was advanced under direct vision into the internal jugular vein while negative pressure was applied. Venous blood returned easily into the syringe. Seldinger technique was used to advance the guidewire into the vein without resistance. The introducer needle was then removed. The dilator was advanced over the guidewire into subcutaneous tissue along the tract. The central venous catheter was then advanced over the guidewire without difficulty and fixed at 20 cm. The guidewire was removed. All three access ports were aspirated and flushed easily. The catheter was sewn in place, a bio patch was placed, and central line dressing was applied under sterile conditions. Following this, the procedure was completed. Patient tolerated the procedure well.    Estimated blood loss: Minimal    Complications: None    A post-procedure chest x-ray was ordered and line was pulled back 5cm.     Margarita Swain MD, PGY-2  General Surgery  [phone]

## 2018-02-26 ENCOUNTER — DOCUMENTATION ONLY (OUTPATIENT)
Dept: TRANSPLANT | Facility: CLINIC | Age: 28
End: 2018-02-26

## 2018-02-26 LAB
ALBUMIN SERPL BCP-MCNC: 1.7 G/DL
ALBUMIN SERPL BCP-MCNC: 1.8 G/DL
ALP SERPL-CCNC: 86 U/L
ALP SERPL-CCNC: 92 U/L
ALT SERPL W/O P-5'-P-CCNC: <5 U/L
ALT SERPL W/O P-5'-P-CCNC: <5 U/L
AMYLASE SERPL-CCNC: 10 U/L
ANION GAP SERPL CALC-SCNC: 12 MMOL/L
ANION GAP SERPL CALC-SCNC: 6 MMOL/L
ANISOCYTOSIS BLD QL SMEAR: SLIGHT
AST SERPL-CCNC: 22 U/L
AST SERPL-CCNC: 25 U/L
BACTERIA BLD CULT: NORMAL
BACTERIA BLD CULT: NORMAL
BASOPHILS # BLD AUTO: ABNORMAL K/UL
BASOPHILS NFR BLD: 0 %
BASOPHILS NFR BLD: 1 %
BILIRUB SERPL-MCNC: 1.7 MG/DL
BILIRUB SERPL-MCNC: 1.7 MG/DL
BUN SERPL-MCNC: 20 MG/DL
BUN SERPL-MCNC: 9 MG/DL
CALCIUM SERPL-MCNC: 8.5 MG/DL
CALCIUM SERPL-MCNC: 8.8 MG/DL
CHLORIDE SERPL-SCNC: 105 MMOL/L
CHLORIDE SERPL-SCNC: 106 MMOL/L
CO2 SERPL-SCNC: 21 MMOL/L
CO2 SERPL-SCNC: 29 MMOL/L
CREAT SERPL-MCNC: 2.8 MG/DL
CREAT SERPL-MCNC: 4.9 MG/DL
CYCLOSPORINE BLD LC/MS/MS-MCNC: 79 NG/ML
DIFFERENTIAL METHOD: ABNORMAL
DIFFERENTIAL METHOD: ABNORMAL
EOSINOPHIL # BLD AUTO: ABNORMAL K/UL
EOSINOPHIL NFR BLD: 2 %
EOSINOPHIL NFR BLD: 2 %
ERYTHROCYTE [DISTWIDTH] IN BLOOD BY AUTOMATED COUNT: 16.7 %
ERYTHROCYTE [DISTWIDTH] IN BLOOD BY AUTOMATED COUNT: 17.1 %
EST. GFR  (AFRICAN AMERICAN): 17.3 ML/MIN/1.73 M^2
EST. GFR  (AFRICAN AMERICAN): 33.9 ML/MIN/1.73 M^2
EST. GFR  (NON AFRICAN AMERICAN): 14.9 ML/MIN/1.73 M^2
EST. GFR  (NON AFRICAN AMERICAN): 29.4 ML/MIN/1.73 M^2
GLUCOSE SERPL-MCNC: 83 MG/DL
GLUCOSE SERPL-MCNC: 94 MG/DL
HCT VFR BLD AUTO: 22.7 %
HCT VFR BLD AUTO: 22.9 %
HGB BLD-MCNC: 7.5 G/DL
HGB BLD-MCNC: 7.5 G/DL
HYPOCHROMIA BLD QL SMEAR: ABNORMAL
IMM GRANULOCYTES # BLD AUTO: ABNORMAL K/UL
IMM GRANULOCYTES # BLD AUTO: ABNORMAL K/UL
IMM GRANULOCYTES NFR BLD AUTO: ABNORMAL %
IMM GRANULOCYTES NFR BLD AUTO: ABNORMAL %
LACTATE SERPL-SCNC: 1.3 MMOL/L
LIPASE SERPL-CCNC: <3 U/L
LYMPHOCYTES # BLD AUTO: ABNORMAL K/UL
LYMPHOCYTES NFR BLD: 34 %
LYMPHOCYTES NFR BLD: 39 %
MAGNESIUM SERPL-MCNC: 1.8 MG/DL
MCH RBC QN AUTO: 31.3 PG
MCH RBC QN AUTO: 31.8 PG
MCHC RBC AUTO-ENTMCNC: 32.8 G/DL
MCHC RBC AUTO-ENTMCNC: 33 G/DL
MCV RBC AUTO: 95 FL
MCV RBC AUTO: 96 FL
METAMYELOCYTES NFR BLD MANUAL: 1 %
MONOCYTES # BLD AUTO: ABNORMAL K/UL
MONOCYTES NFR BLD: 18 %
MONOCYTES NFR BLD: 4 %
NEUTROPHILS NFR BLD: 35 %
NEUTROPHILS NFR BLD: 51 %
NEUTS BAND NFR BLD MANUAL: 4 %
NEUTS BAND NFR BLD MANUAL: 9 %
NRBC BLD-RTO: 0 /100 WBC
NRBC BLD-RTO: 0 /100 WBC
OVALOCYTES BLD QL SMEAR: ABNORMAL
PHOSPHATE SERPL-MCNC: 3.1 MG/DL
PLATELET # BLD AUTO: 149 K/UL
PLATELET # BLD AUTO: 163 K/UL
PLATELET BLD QL SMEAR: ABNORMAL
PMV BLD AUTO: 10.3 FL
PMV BLD AUTO: 10.6 FL
POIKILOCYTOSIS BLD QL SMEAR: SLIGHT
POLYCHROMASIA BLD QL SMEAR: ABNORMAL
POTASSIUM SERPL-SCNC: 3.5 MMOL/L
POTASSIUM SERPL-SCNC: 3.8 MMOL/L
PREALB SERPL-MCNC: 9 MG/DL
PROT SERPL-MCNC: 4.9 G/DL
PROT SERPL-MCNC: 5.2 G/DL
RBC # BLD AUTO: 2.36 M/UL
RBC # BLD AUTO: 2.4 M/UL
SODIUM SERPL-SCNC: 139 MMOL/L
SODIUM SERPL-SCNC: 140 MMOL/L
T4 FREE SERPL-MCNC: 0.83 NG/DL
TACROLIMUS BLD-MCNC: 5.2 NG/ML
TACROLIMUS BLD-MCNC: 5.2 NG/ML
TSH SERPL DL<=0.005 MIU/L-ACNC: 5.59 UIU/ML
WBC # BLD AUTO: 2.56 K/UL
WBC # BLD AUTO: 2.68 K/UL

## 2018-02-26 PROCEDURE — 25000003 PHARM REV CODE 250: Performed by: PHYSICIAN ASSISTANT

## 2018-02-26 PROCEDURE — 80177 DRUG SCRN QUAN LEVETIRACETAM: CPT

## 2018-02-26 PROCEDURE — 25000003 PHARM REV CODE 250: Performed by: INTERNAL MEDICINE

## 2018-02-26 PROCEDURE — 63600175 PHARM REV CODE 636 W HCPCS: Mod: JG | Performed by: SURGERY

## 2018-02-26 PROCEDURE — 90935 HEMODIALYSIS ONE EVALUATION: CPT | Mod: ,,, | Performed by: INTERNAL MEDICINE

## 2018-02-26 PROCEDURE — 97803 MED NUTRITION INDIV SUBSEQ: CPT | Performed by: DIETITIAN, REGISTERED

## 2018-02-26 PROCEDURE — B4185 PARENTERAL SOL 10 GM LIPIDS: HCPCS | Performed by: PHYSICIAN ASSISTANT

## 2018-02-26 PROCEDURE — 83690 ASSAY OF LIPASE: CPT

## 2018-02-26 PROCEDURE — 83735 ASSAY OF MAGNESIUM: CPT

## 2018-02-26 PROCEDURE — 63600175 PHARM REV CODE 636 W HCPCS: Performed by: PHYSICIAN ASSISTANT

## 2018-02-26 PROCEDURE — 25000003 PHARM REV CODE 250: Performed by: NURSE PRACTITIONER

## 2018-02-26 PROCEDURE — 36415 COLL VENOUS BLD VENIPUNCTURE: CPT

## 2018-02-26 PROCEDURE — 25000003 PHARM REV CODE 250: Performed by: SURGERY

## 2018-02-26 PROCEDURE — 80053 COMPREHEN METABOLIC PANEL: CPT | Mod: 91

## 2018-02-26 PROCEDURE — 84100 ASSAY OF PHOSPHORUS: CPT

## 2018-02-26 PROCEDURE — 80158 DRUG ASSAY CYCLOSPORINE: CPT

## 2018-02-26 PROCEDURE — 84443 ASSAY THYROID STIM HORMONE: CPT

## 2018-02-26 PROCEDURE — A4217 STERILE WATER/SALINE, 500 ML: HCPCS | Performed by: PHYSICIAN ASSISTANT

## 2018-02-26 PROCEDURE — 83605 ASSAY OF LACTIC ACID: CPT

## 2018-02-26 PROCEDURE — 84439 ASSAY OF FREE THYROXINE: CPT

## 2018-02-26 PROCEDURE — 63600175 PHARM REV CODE 636 W HCPCS: Performed by: NURSE PRACTITIONER

## 2018-02-26 PROCEDURE — 85027 COMPLETE CBC AUTOMATED: CPT | Mod: 91

## 2018-02-26 PROCEDURE — 80053 COMPREHEN METABOLIC PANEL: CPT

## 2018-02-26 PROCEDURE — 84134 ASSAY OF PREALBUMIN: CPT

## 2018-02-26 PROCEDURE — 90935 HEMODIALYSIS ONE EVALUATION: CPT

## 2018-02-26 PROCEDURE — 82150 ASSAY OF AMYLASE: CPT

## 2018-02-26 PROCEDURE — 20600001 HC STEP DOWN PRIVATE ROOM

## 2018-02-26 PROCEDURE — 80197 ASSAY OF TACROLIMUS: CPT

## 2018-02-26 PROCEDURE — 85007 BL SMEAR W/DIFF WBC COUNT: CPT

## 2018-02-26 RX ORDER — FENTANYL CITRATE 50 UG/ML
25 INJECTION, SOLUTION INTRAMUSCULAR; INTRAVENOUS ONCE
Status: COMPLETED | OUTPATIENT
Start: 2018-02-26 | End: 2018-02-26

## 2018-02-26 RX ORDER — CYCLOBENZAPRINE HCL 5 MG
5 TABLET ORAL ONCE
Status: DISCONTINUED | OUTPATIENT
Start: 2018-02-26 | End: 2018-02-28

## 2018-02-26 RX ORDER — FENTANYL CITRATE 50 UG/ML
25 INJECTION, SOLUTION INTRAMUSCULAR; INTRAVENOUS
Status: DISCONTINUED | OUTPATIENT
Start: 2018-02-26 | End: 2018-03-02

## 2018-02-26 RX ORDER — FENTANYL CITRATE 50 UG/ML
50 INJECTION, SOLUTION INTRAMUSCULAR; INTRAVENOUS ONCE
Status: COMPLETED | OUTPATIENT
Start: 2018-02-26 | End: 2018-02-26

## 2018-02-26 RX ORDER — NALOXONE HCL 0.4 MG/ML
0.2 VIAL (ML) INJECTION
Status: DISCONTINUED | OUTPATIENT
Start: 2018-02-26 | End: 2018-04-10 | Stop reason: HOSPADM

## 2018-02-26 RX ORDER — FENTANYL CITRATE 50 UG/ML
INJECTION, SOLUTION INTRAMUSCULAR; INTRAVENOUS
Status: DISPENSED
Start: 2018-02-26 | End: 2018-02-27

## 2018-02-26 RX ADMIN — DRONABINOL 2.5 MG: 2.5 CAPSULE ORAL at 05:02

## 2018-02-26 RX ADMIN — LEVETIRACETAM 500 MG: 100 SOLUTION ORAL at 08:02

## 2018-02-26 RX ADMIN — METOCLOPRAMIDE HYDROCHLORIDE 10 MG: 5 SOLUTION ORAL at 12:02

## 2018-02-26 RX ADMIN — TACROLIMUS 1 MG: 1 CAPSULE ORAL at 05:02

## 2018-02-26 RX ADMIN — SODIUM CHLORIDE 0.5 G: 0.9 INJECTION, SOLUTION INTRAVENOUS at 08:02

## 2018-02-26 RX ADMIN — OXYCODONE HYDROCHLORIDE 10 MG: 5 TABLET ORAL at 08:02

## 2018-02-26 RX ADMIN — ERYTHROPOIETIN 10000 UNITS: 10000 INJECTION, SOLUTION INTRAVENOUS; SUBCUTANEOUS at 11:02

## 2018-02-26 RX ADMIN — FENTANYL CITRATE 25 MCG: 50 INJECTION INTRAMUSCULAR; INTRAVENOUS at 02:02

## 2018-02-26 RX ADMIN — OXYCODONE HYDROCHLORIDE 10 MG: 5 TABLET ORAL at 01:02

## 2018-02-26 RX ADMIN — FENTANYL CITRATE 50 MCG: 50 INJECTION INTRAMUSCULAR; INTRAVENOUS at 02:02

## 2018-02-26 RX ADMIN — URSODIOL 300 MG: 300 CAPSULE ORAL at 08:02

## 2018-02-26 RX ADMIN — ACETAMINOPHEN 650 MG: 325 TABLET, FILM COATED ORAL at 02:02

## 2018-02-26 RX ADMIN — LEVOTHYROXINE SODIUM 75 MCG: 75 TABLET ORAL at 05:02

## 2018-02-26 RX ADMIN — SOYBEAN OIL 250 ML: 20 INJECTION, SOLUTION INTRAVENOUS at 09:02

## 2018-02-26 RX ADMIN — METOCLOPRAMIDE HYDROCHLORIDE 10 MG: 5 SOLUTION ORAL at 08:02

## 2018-02-26 RX ADMIN — URSODIOL 300 MG: 300 CAPSULE ORAL at 06:02

## 2018-02-26 RX ADMIN — SODIUM CHLORIDE: 0.9 INJECTION, SOLUTION INTRAVENOUS at 08:02

## 2018-02-26 RX ADMIN — HEPARIN SODIUM 5000 UNITS: 5000 INJECTION, SOLUTION INTRAVENOUS; SUBCUTANEOUS at 08:02

## 2018-02-26 RX ADMIN — LEVETIRACETAM 500 MG: 100 SOLUTION ORAL at 12:02

## 2018-02-26 RX ADMIN — BISACODYL 10 MG: 5 TABLET, COATED ORAL at 05:02

## 2018-02-26 RX ADMIN — SODIUM CHLORIDE: 9 INJECTION, SOLUTION INTRAVENOUS at 12:02

## 2018-02-26 RX ADMIN — METOCLOPRAMIDE HYDROCHLORIDE 10 MG: 5 SOLUTION ORAL at 05:02

## 2018-02-26 RX ADMIN — THERA TABS 1 TABLET: TAB at 12:02

## 2018-02-26 RX ADMIN — HEPARIN SODIUM 1000 UNITS: 1000 INJECTION, SOLUTION INTRAVENOUS; SUBCUTANEOUS at 11:02

## 2018-02-26 RX ADMIN — SODIUM CHLORIDE: 234 INJECTION INTRAMUSCULAR; INTRAVENOUS; SUBCUTANEOUS at 09:02

## 2018-02-26 RX ADMIN — OMEPRAZOLE 40 MG: 40 CAPSULE, DELAYED RELEASE ORAL at 06:02

## 2018-02-26 NOTE — PROGRESS NOTES
[facility]  Adult Nutrition  Progress Note    SUMMARY     Recommendations    Recommendation/Intervention:   -Continue current TPN regimen, encouraging PO intake as tolerated with Boost Breeze and Beneprotein to help meet needs.   -Continue to encourage oral intake while TPN running. Hopeful marinol will help patient meet good nutrition status.   -Current TPN provision providing 66% EEN and 90% EPN. If PO intake does not improve to 50% of meals within 48 hours, recommend increasing TPN provision to better meet needs. Recommend custom TPN of 90g AA / 335g dex plus IV lipids daily.  -RD following.     Goals: Consume/tolerate > 85% EEN and EPN  Nutrition Goal Status: goal not met  Communication of RD Recs: reviewed with physician      Reason for Assessment    Reason for Assessment: RD follow-up  Diagnosis: transplant/postoperative complications  Relevent Medical History: OLTx 10/19/2017, complicate post-op w/ LAURA ongoing, chronic malnutrition, cirrhosis 2' EtOH   Interdisciplinary Rounds: attended     General Information Comments: NGT removed 2/23, vomited up PO intake 2/24, patient now on TPN, in HD this AM; PO intake remains poor, remains on marinol, noted pt currently experiencing 10/10 back pain and 0% oral intake today thus far    Nutrition Discharge Planning: Adequate nutrition PO vs EN    Nutrition Prescription Ordered    Current Diet Order: Regular  Nutrition Order Comments: with continuous TF  Current Nutrition Support Formula Ordered:  (Custom TPN 84g AA / 166g dex + IV lipids)  Current Nutrition Support Rate Ordered: 48 (ml)  Current Nutrition Support Frequency Ordered: mL/hr  Oral Nutrition Supplement: Boost Breeze     Evaluation of Received Nutrients/Fluid Intake    Enteral Calories (kcal): 0  Enteral Protein (gm): 0  Enteral (Free Water) Fluid (mL): 0     Parenteral Calories (kcal): 900  Parenteral Protein (gm): 84  Parenteral Fluid (mL): 1152      Total Calories (kcal): 0     Energy  "Calories Required: not meeting needs  % Kcal Needs: 67      Protein Required: meeting needs  % Protein Needs: 90      GIR (Glucose Infusion Rate) (mg/kg/min): 1.69 mg/kg/min  Lipid Calories (kcals): 500 kcals  I/O: +2.4L since admit       Fluid Required: meeting needs  Comments: LBM   Tolerance: tolerating  % Intake of Estimated Energy Needs: 50 - 75 %  % Meal Intake: 0%     Nutrition Risk Screen     Nutrition Risk Screen: large or nonhealing wound, burn or pressure ulcer    Nutrition/Diet History    Patient Reported Diet/Restrictions/Preferences: low salt  Typical Food/Fluid Intake: eats very little at this time  Food Preferences: No cultural or [employer] food preferences noted  Meal/Snack Patterns: pt likes to cook, when feeling well will cook and then only eat bites  Supplemental Drinks or Food Habits:  (protein powders sometimes)  Factors Affecting Nutritional Intake: altered gastrointestinal function, abdominal distention, decreased appetite, nausea/vomiting        Labs/Tests/Procedures/Meds     Pertinent Labs Reviewed: reviewed, pertinent  Pertinent Labs Comments: Cr 4.9, PAB 9, TBili 1.7  Pertinent Medications Reviewed: reviewed, pertinent  Pertinent Medications Comments: bisacodyl, docusate, epoetin, fentanyl, heparin, MVI, psyllium, tacrolimus    Physical Findings    Overall Physical Appearance: generalized wasting, loss of muscle mass, loss of subcutaneous fat, weak  Tubes: nasoduoduenal tube  Oral/Mouth Cavity: WDL  Skin: intact    Anthropometrics    Temp: 99.4 °F (37.4 °C)     Height: 5' 6" (167.6 cm)  Weight Method: Bed Scale  Weight: 69.8 kg (153 lb 14.1 oz)  Ideal Body Weight (IBW), Male: 142 lb     % Ideal Body Weight, Male (lb): 112.68 lb     BMI (Calculated): 25.9  BMI Grade: 25 - 29.9 - overweight  Weight Loss: unintentional  Usual Body Weight (UBW), k kg (2017 )     % Usual Body Weight: 86.71  % Weight Change From Usual Weight: -13.47 %       Estimated/Assessed Needs    Weight Used " For Calorie Calculations: 62.3 kg (137 lb 5.6 oz)   Height (cm): 167.6 cm  Energy Calorie Requirements (kcal): 7221-1347 (30-35 kcal/kg)  Energy Need Method: Kcal/kg      RMR (Gaston-St. Jeor Equation): 1535.75      Weight Used For Protein Calculations: 62.3 kg (137 lb 5.6 oz)  Protein Requirements: 81-94g (1.3-1.5 g/kg)    Fluid Requirements (mL): 1mL/kcal  Fluid Need Method: RDA Method      RDA Method (mL): 1869         Assessment and Plan    Severe protein-calorie malnutrition    Nutrition Diagnosis  Inadequate oral intake    Related to (etiology):   Decreased appetite, abdominal distention    Signs and Symptoms (as evidenced by):   Pt eating 0% of meals and relying on enteral nutrition for 100% of EPN, EEN     Interventions/Recommendations (treatment strategy):  See recs    Nutrition Diagnosis Status:   Improving            Monitor and Evaluation    Food and Nutrient Intake: energy intake, food and beverage intake  Food and Nutrient Adminstration: diet order  Knowledge/Beliefs/Attitudes: food and nutrition knowledge/skill  Physical Activity and Function: nutrition-related ADLs and IADLs  Anthropometric Measurements: weight, weight change, body mass index  Biochemical Data, Medical Tests and Procedures: electrolyte and renal panel, lipid profile, gastrointestinal profile, glucose/endocrine profile, inflammatory profile  Nutrition-Focused Physical Findings: overall appearance    Nutrition Risk    Level of Risk:  (2x/week)    Nutrition Follow-Up    RD Follow-up?: Yes

## 2018-02-26 NOTE — PROGRESS NOTES
Patient transported off the unit per wheelchair with transport aid.  No acute distress noted at this time.  Patients vitals stable. All applicable medication given by night RN.  Will monitor for patients return.

## 2018-02-26 NOTE — NURSING
Checked to see if patient was ready for out patient kidney transplant  Appointment but patient is still hospitalized. Will continue to monitor.

## 2018-02-26 NOTE — PROGRESS NOTES
[facility]  Liver Transplant  Progress Note    Patient Name: Jhonny Diana  MRN: [id number]  Admission Date: 2018  Hospital Length of Stay: 46 days  Code Status: Full Code  Primary Care Provider: Primary Doctor No  Post-Operative Day: 130    ORGAN:   LIVER  Disease Etiology: Acute Alcoholic Hepatitis  Donor Type:    - Brain Death  CDC High Risk:   No  Donor CMV Status:   Donor CMV Status: Positive  Donor HBcAB:   Negative  Donor HCV Status:   Negative  Whole or Partial: Whole Liver  Biliary Anastomosis: End to End  Arterial Anatomy: Standard  Subjective:     History of Present Illness:  Jhonny Diana is a 28 y/o male with past medical history of alcoholic cirrhosis.  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 Found to have peritonitis in November (WBC 5000, 75% PNM) neg for bile leak. He was treated initially with vanc/cefepime. Repeat cell counts  with some improvement (WBC 1400, 45% PNM). He has undergone multiple paracenteses as well as abscess drainage of perihepatic fluid collections and treated with antimicrobial therapy but no positive cultures. Of note, biliary stricture also identified and ERCP performed on 2017 with sphincterotomy and biliary stent placed. Liver tests still have not normalized despite intervention, bilirubin and AP remain elevated. Other pertinent PMH current wound vac in place 2/2 wound infection, malnutrition requiring TPN for short course and ongoing hypoalbuminemia, and seizure activity while on prograf and has since been switched to cyclosporine without reoccurrence.  He presented to the ER for fever, abdominal pain, and N/V. He reports fever (103) for 1  day prior. Overnight, he developed N/V, reports small amount of green emesis with new left sided pain. He also endorses worsening SOB with exertion. He was scheduled as an outpatient for follow up paracentesis and IR drainage of fluid collection. CXR in ER shows large pleural effusion with subsegmental atelectasis. Infectious work up initiated in ER. His ANC is 900. Broad spectrum antibiotics initiated in ED. He denies chest pain, palpitations, diarrhea, constipation, or back pain. Denies any sick contacts.    Hospital Course:  Thoracentesis (1.2L off), Paracentesis (1.6L off), and IR drainage of fluid collection 1/11, all fluids negative for infection. ID consulted. Broad spectrum antibiotics d/c'd 1/15. Chronically malnourished with poor PO intake, prealbumin 7. Shane tube placed 1/15 for tube feedings.     ERCP 1/17 with sludge and a biliary stone which was removed and stent exchanged.  Remained with n/v; therefore, TF remained on hold and TPN continued.   Pt with fever s/p ERCP on 1/17 which continued until 1/19.  Vanc/cefepime restarted.  Blood cx 1/17 and 1/18 NGTD.  ID reconsulted.  Fungal markers sent.  CT C/A/P obtained.  With large R pleural effusion and ascites- both drained 1/19 and negative for infx per cell count. Pt afebrile 1/20. Liver biopsy 1/23 - without rejection.  EGD 1/25 - unremarkable for source of GI symptoms.  Resume diet along with other GI recommendations. SHANE tube placed for tube feeds.       2/22: Pt again spiked temp of 101 overnight, now down to 99. Resent Bcx overnight, prelim NGTD. Transitioned to Erta from vanc/zosyn per ID. Planning for tagged WBC scan on Monday. R sided abdominal erythema/dependent edema improving today.  TF have been on hold since 2/20. Will continue to hold today per pt request. Pt working on diet. KUB yesterday with significant stool and gas. Declines colace, miralax, metamucil, but willing to take daily bisacodyl. Pt agreeable to suppository today. If  suppository not successful can give mag citrate through dylan tonight. Will also retrial Marinol tonight as pt reported improvement in appetite previously. Will monitor closely for sedation.     2/23:  2 small BM 2/22.  Tolerated HD.  Dylan removed.  Will attempt to eat this PM.      2/24:  Pt ate then vomited hamburger 2/23.  Will resume TPN.  D/C atovaquone, transition cyclo to Prograf, check Keppra level, increase Reglan to 0 mg, change twyla to BID for nausea.  Will give pentam today.    2/25:  TPN going well, pt ate 1/2 subway sandwhich without vomiting yesterday, will continue to monitor.  Plan to repeat EGD this week.  Pt with drop in H/h again and appears to be hemolyzing.  LDH elevated, hapto slightly low, LEE pending.  With neutropenia- Neupogen given.    2/26:  Pt with severe back pain s/p HD. Unclear if secondary to neupogen.  STAT CBC, CMP, ponce/lipase, lactate all normal.  CT A/P pending.  Indium scan tomorrow- injection done today (back pain started prior).    Scheduled Meds:   bisacodyl  10 mg Oral Daily    cyclobenzaprine  5 mg Oral Once    docusate sodium  100 mg Oral BID    dronabinol  2.5 mg Oral Daily    epoetin maurisio (PROCRIT) injection  10,000 Units Intravenous Every Mon, Wed, Fri    ertapenem (INVANZ) IVPB  500 mg Intravenous Q24H    fat emulsion 20%  250 mL Intravenous Daily    fentaNYL        heparin (porcine)  5,000 Units Subcutaneous Q8H    custom IVPB builder   Intravenous Q24H    levetiracetam oral soln  500 mg Oral BID    levothyroxine  75 mcg Oral Before breakfast    metoclopramide HCl  10 mg Oral QID (AC & HS)    multivitamin  1 tablet Oral Daily    omeprazole  40 mg Oral QAM    polyethylene glycol  17 g Oral BID    psyllium husk (aspartame)  3.4 g Oral BID    tacrolimus  1 mg Oral BID    ursodiol  300 mg Oral BID     Continuous Infusions:   TPN ADULT CENTRAL LINE CUSTOM 48 mL/hr at 02/25/18 2205    TPN ADULT CENTRAL LINE CUSTOM       PRN Meds:sodium chloride 0.9%,  acetaminophen, albuterol-ipratropium 2.5mg-0.5mg/3mL, bisacodyl, dextrose 50%, dextrose 50%, fentaNYL, glucagon (human recombinant), glucose, glucose, heparin (porcine), naloxone, ondansetron, ondansetron, oxyCODONE, prochlorperazine, simethicone, sodium chloride 0.9%    Review of Systems   Constitutional: Positive for activity change, appetite change and fatigue. Negative for chills and fever.   HENT: Negative.  Negative for congestion and facial swelling.    Eyes: Negative for pain, discharge and visual disturbance.   Respiratory: Negative for cough, chest tightness, shortness of breath and wheezing.    Cardiovascular: Negative for chest pain, palpitations and leg swelling.   Gastrointestinal: Positive for abdominal distention, abdominal pain and nausea. Negative for constipation.   Endocrine: Negative.    Genitourinary: Positive for decreased urine volume. Negative for difficulty urinating.        Anuric   Musculoskeletal: Positive for back pain. Negative for arthralgias and myalgias.   Skin: Positive for color change and wound. Negative for rash.   Allergic/Immunologic: Positive for immunocompromised state.   Neurological: Negative for dizziness, seizures, weakness and headaches.   Psychiatric/Behavioral: Positive for decreased concentration and dysphoric mood. Negative for confusion. The patient is not nervous/anxious.    All other systems reviewed and are negative.    Objective:     Vital Signs (Most Recent):  Temp: (!) 100.6 °F (38.1 °C) (02/26/18 1541)  Pulse: 103 (02/26/18 1541)  Resp: 16 (02/26/18 1541)  BP: 120/67 (02/26/18 1541)  SpO2: 98 % (02/26/18 1541) Vital Signs (24h Range):  Temp:  [98.6 °F (37 °C)-100.8 °F (38.2 °C)] 100.6 °F (38.1 °C)  Pulse:  [] 103  Resp:  [16-24] 16  SpO2:  [91 %-98 %] 98 %  BP: (110-150)/(67-85) 120/67     Weight: 69.8 kg (153 lb 14.1 oz)  Body mass index is 24.84 kg/m².    Intake/Output - Last 3 Shifts       02/24 0700 - 02/25 0659 02/25 0700 - 02/26 0659 02/26 0700 -  02/27 0659    P.O. 600 900 180    Other   600    IV Piggyback 350 350     .4 1296.3 405.2    Total Intake(mL/kg) 1537.4 (22.6) 2546.3 (36.5) 1185.2 (17)    Urine (mL/kg/hr) 0 (0) 125 (0.1) 100 (0.2)    Emesis/NG output  0 (0) 0 (0)    Other  0 (0) 2600 (4.1)    Stool 0 (0) 0 (0) 0 (0)    Blood  0 (0) 0 (0)    Total Output 0 125 2700    Net +1537.4 +2421.3 -1514.8           Urine Occurrence 0 x 0 x 0 x    Stool Occurrence 1 x 0 x 0 x    Emesis Occurrence  0 x 0 x          Physical Exam   Constitutional: He is oriented to person, place, and time. He appears well-developed. No distress.   Temporal and distal extremity muscle wasting   HENT:   Head: Normocephalic and atraumatic.   Mouth/Throat: No oropharyngeal exudate.   dylan tube   Eyes: EOM are normal. Pupils are equal, round, and reactive to light. Scleral icterus is present.   Neck: Normal range of motion. Neck supple. No JVD present. No thyromegaly present.   Cardiovascular: Normal rate, regular rhythm, normal heart sounds and intact distal pulses.    No murmur heard.  Pulmonary/Chest: Effort normal. No respiratory distress. He has decreased breath sounds in the right middle field, the right lower field and the left lower field. He has no wheezes. He exhibits no tenderness.   Diminished to RLL   Abdominal: Soft. Bowel sounds are normal. He exhibits ascites. He exhibits no distension. There is tenderness. There is no rebound and no guarding.   Dressing to chevron.  Wd vac removed 1/12/18  Dependent edema present R flank; surrounding erythema   Musculoskeletal: Normal range of motion. He exhibits edema (2+ LE edema). He exhibits no tenderness.   Neurological: He is alert and oriented to person, place, and time. He has normal reflexes.   Skin: Skin is warm and dry. Capillary refill takes 2 to 3 seconds. He is not diaphoretic. No erythema.   jaundice   Psychiatric: He has a normal mood and affect. His behavior is normal. Judgment and thought content normal. His  mood appears not anxious.   Depressed mood   Nursing note and vitals reviewed.      Laboratory:  Immunosuppressants         Stop Route Frequency     tacrolimus capsule 1 mg      -- Oral 2 times daily        CBC:     Recent Labs  Lab 02/26/18  1418   WBC 2.56*   RBC 2.40*   HGB 7.5*   HCT 22.9*   *   MCV 95   MCH 31.3*   MCHC 32.8     CMP:     Recent Labs  Lab 02/26/18  1418   GLU 94   CALCIUM 8.5*   ALBUMIN 1.8*   PROT 5.2*      K 3.8   CO2 29      BUN 9   CREATININE 2.8*   ALKPHOS 92   ALT <5*   AST 22   BILITOT 1.7*     Labs within the past 24 hours have been reviewed.    Diagnostic Results:  pertinent imaging reviewed    Assessment/Plan:     Liver transplanted    - Post op course complicated by fevers and hyperbilirubinemia.  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  - AST/ALT normal. Tbili/alkphos remain elevated.  - Liver US 1/8 showed 3.9 cm complex fluid collection anterior to right lobe and moderate nonspecific complex ascites inferior to transplant.   - IR placed drain 1/11/18, cell count negative for infection.  - PBS consulted. PBS not comfortable proceeding with EUS with liver biopsy given fever and possible infectious process going on. ERCP 1/17 with stone and sludge. Pt placed on Actigall - treated with 10 day course of abx.  ID now signed off.  - Bili elevated - liver u/s 1/28  - Liver biopsy 1/23 without rejection.  - consulted PBS for ERCP - Choledocholithiasis was found along with sludge, stents placed 1/30.  - ERCP 2/19, overall unremarkable findings. Did place new larger stents. Will need repeat ERCP in 10 weeks. Bilirubin improving        Long-term use of immunosuppressant medication    - Maintenance IS with cyclosporine -->transitioned to Prograf 2/24 today for continued nausea.  - MMF on hold for infections and neutropenia.   - Continue to check cyclosporine and Prograf level daily during transition. Assess for toxicity and adjust level as needed.         Prophylactic immunotherapy    - See long term use of immunosuppression.         At risk for opportunistic infections    - CMV detected 2/1 now on tx dose valcyte. CMV PCR 2/15 not detected. CMV PCR 2/22, negative   - Pentam given 2/24            Other neutropenia    - Neupogen given 2/25  - Pt with severe back pain today --> unclear if source        Other cytomegaloviral diseases    - detected at 370 on CMV PCR 2/1.  - case discussed with ID and will hold off on treatment at this time given low WBC.   - undetected CMV PCR 2/8.  - continue Valcyte 200 mg every Mon, Wed, Fri.  - CMV PCR 2/15 negative  - Per ID, valcyte dose decreased to ppx dose on 2/18.  - CMV PCR 2/22 negative  - Repeat scope this week as pt with leukopenia, h/o CMV, and burning pain despite negative PCR        Fever    - Started on vanc/cefepime for ERCP. Now transitioned to vanc/zosyn.  - Blood cultures sent, prelim NGTD. Repeat blood cultures and chest xray today 2/10.  - thoracentesis 2/1 - fluid negative for infection.  - atelectasis noted on ct scan - start breathing tx & CPT.  - now cmv positive - started treatment 2/5/18.  - taken to OR 2/7 for exp lap - fluid collections drained, cultures pending.  - Patient with low grade fever 2/15, 100.3.  -CT A/P 2/15 with no evidence of IA fluid infection. Did show R pleural effusion s/p thoracentesis, cell count negative for infection, cx pending.  -Repeat blood cx 2/15 prelim NGTD, CMV PCR 2/15 negative   Complaining of abdominal pain. Is tender in RUQ.  -Remains with fevers  - Bilirubin remains elevated. Concerning for cholangitis. Antibiotics broadened 2/17. ID re consulted.   - ERCP  2/19, overall unremarkable findings. Did place new larger stents. Will need repeat ERCP in 10 weeks. Bilirubin is stable post ERCP.  -Tmax 101 2/21 PM again ID following. Plan to transition vanc/zosyn to ertapenem 2/22 as no improvement noted.   - Has been afebrile since switch to Erta until 2/26 PM spiked  100.6  - will obtain tagged WBC scan Monday per ID recs.             Constipation    - Enema ordered 1/20 and 1/21 with BM.  - Encourage ambulation.  - decrease narcotics.  - d/c metamucil and miralax bid as makes patient nauseated.  - increase reglan qid.  - small bowel follow through with normal findings.   - patient reports last BM 2/19   -schedule bisacodyl  -KUB 2/21 with significant amounts of stool and gas. Pt declines miralax, metamucil, colace.   -encouraged suppository.  - Improved on KUB 2/23        Other ascites    - Paracentesis performed 1/11/18 with 1600 ml removed.    - fluid negative for infection.   - Repeat para 1/19 negative for infection per cell count.  - no fluid seen for paracentesis on 1/23.        Severe protein-calorie malnutrition    - Poor PO intake since transplant requiring short course of TPN during previous hospital stay.   - albumin remains decreased but appetite slowly improving per pt.   - Dietary consulted. Will need to closely monitor PO intake.   - supplements also ordered.   - SHANE with tube feeding start 1/16. Stopped 1/17/18, was not tolerating. Shane removed 1/20.  - prealbumin 7 on 1/15.  - TPN started 1/16/18.   - Pt able to eat minimally 1/21 and 1/22 which is an improvement.  - gi consulted - EGD reviewed, increased ppi bid, miralax bid, and metamucil bid, decreased narcotics.  - continue reglan, placed SHANE tube with tube feeds - transitioned to nightly feeds. Not tolerating well due to nausea.   - small bowel follow through with normal findings.   - consulted gi for possible gj tube placement, holding off on this for now as with ongoing fevers.  -Dietary changed tube feeds back to 24 hours from nightly 2/19 as not eating enough during the day to meet caloric needs. TF on hold since 2/20.  - retrial marinol 2/22 as pt reports that's the only thing helping his appetite, in the evening 2/2 concern for possible drowsiness  - pt feels marinol is helping  - Pt vomited 2/23  after trying hamburger  - Start TPN 2/24- continue  - Pt able to 1/4 sandwich without vomiting 2/25          Nausea and vomiting    - h/o constipation, reports having regular BM's with bowel regimen.  - vomiting episode x 1 at home. Anti-emetics ordered PRN.  - KUB repeated 1/14/18 given increased abdominal pain- mild, generalized bowel distention suggesting ileus.  Diet changed to clears as tolerated for bowel rest .  - SHANE tube placed 1/16 for tube feeds. Not tolerating tube feeds, stopped 1/17/18.  - D/C shane 1/19, now in place as of 1/25.  - chronic constipation, Cont bowel regimen.   - See protein calorie malnutrition.  - TF have been on hold since 2/20 for N/V.   - D/C shane 2/23 as not tolerating TF  - 2/24 changes: Increased Reglan to 10 mg, D/C atovaquone, transitioned cyclo to Prograf, change twyla to BID, check Keppra level  - Repeat EGD this week as pt reports burning pain in stomach  - Nausea slightly improved today          Recurrent pleural effusion on right    - CXR in ER with large right pleural effusion with subjective c/o worsening SOB  - thoracentesis completed- 1200 ml removed.  SOB with significant improvement.   - Cell count reviewed and negative for infection.   - resp even and non labored.  O2 sat 94-98%.  - Repeat thora 1/19 negative for infection per cell count.  - Reaccumulating fluid on xray 1/21.  - thoracentesis 1/31 - 1.4L removed, negative for infection  - Reaccumulating fluid again on xray 2/10.   -Thoracentesis performed  2/16 with 1500 cc off, cell count neg for infection, cx no growth to date.         Biliary stricture of transplanted liver    - tbili with increase prompting ERCP 1/30 with stones and sludge, stents placed.  -  Repeat ERCP 2/19 as was having recurrent fevers with bilirubin stuck at 2.0-2.2 with overall unremarkable findings seen. Did place new larger stents. Will need repeat ERCP in 10 weeks.         Anemia of chronic disease    - H/H stable. Cont to monitor with  daily cbc.         Delayed surgical wound healing    - wd vac removed 1/12/18.  Wound healing well. Aquacel AG and Mepilex border dressing applied via wound care recs.           Acute renal failure with tubular necrosis    - HD resumed on previous admission. Now anuric and dialyzes M-W-F.  - Nephrology following.    - ktm consulted for possible kidney transplant.            VTE Risk Mitigation         Ordered     heparin (porcine) injection 1,000 Units  As needed (PRN)     Route:  Intra-Catheter        02/05/18 0945     heparin (porcine) injection 5,000 Units  Every 8 hours     Route:  Subcutaneous        01/31/18 0957     Medium Risk of VTE  Once      01/11/18 0351     Place sequential compression device  Until discontinued      01/11/18 0351          The patients clinical status was discussed at multidisplinary rounds, involving transplant surgery, transplant medicine, pharmacy, nursing, nutrition, and social work    Discharge Planning:  Monitor kidney function --> may need outpt HD  Monitor HH needs vs rehab for deconditioned status      Geronimo Vera PA-C  Liver Transplant  [facility]

## 2018-02-26 NOTE — PROGRESS NOTES
CHARI Vera at the bedside, patient still complaining of 10/10 back pain.  CBC, CMP sent, 50mcg of fentanyl given, CT ordered.  Patient still complaining of 9/10 back pain, no bruising noted to back.  CHARI ABARCA ordered additional dose of 25 mcg of fentanyl.  Vitals stable.  Will continue to monitor closely.

## 2018-02-26 NOTE — PROGRESS NOTES
Notified patient that he can have pain medication at 1215, after patient told Charge RN KELSEY Lozano that he was having back pain.  2 minutes after RN exited the room patient called and stated he needed something for pain, back pain 10/10.  Notified CHARI ABARCA of the following.  Patient had just received blood back for indium scan.  Vitals stable.  Patient stated the pain was bed=fore the blood was returned.  CHARI ABARCA stated that the po pain medication could be given early and that she would be by to see him.

## 2018-02-26 NOTE — SUBJECTIVE & OBJECTIVE
Scheduled Meds:   bisacodyl  10 mg Oral Daily    cyclobenzaprine  5 mg Oral Once    docusate sodium  100 mg Oral BID    dronabinol  2.5 mg Oral Daily    epoetin maurisio (PROCRIT) injection  10,000 Units Intravenous Every Mon, Wed, Fri    ertapenem (INVANZ) IVPB  500 mg Intravenous Q24H    fat emulsion 20%  250 mL Intravenous Daily    fentaNYL        heparin (porcine)  5,000 Units Subcutaneous Q8H    custom IVPB builder   Intravenous Q24H    levetiracetam oral soln  500 mg Oral BID    levothyroxine  75 mcg Oral Before breakfast    metoclopramide HCl  10 mg Oral QID (AC & HS)    multivitamin  1 tablet Oral Daily    omeprazole  40 mg Oral QAM    polyethylene glycol  17 g Oral BID    psyllium husk (aspartame)  3.4 g Oral BID    tacrolimus  1 mg Oral BID    ursodiol  300 mg Oral BID     Continuous Infusions:   TPN ADULT CENTRAL LINE CUSTOM 48 mL/hr at 02/25/18 2205    TPN ADULT CENTRAL LINE CUSTOM       PRN Meds:sodium chloride 0.9%, acetaminophen, albuterol-ipratropium 2.5mg-0.5mg/3mL, bisacodyl, dextrose 50%, dextrose 50%, fentaNYL, glucagon (human recombinant), glucose, glucose, heparin (porcine), naloxone, ondansetron, ondansetron, oxyCODONE, prochlorperazine, simethicone, sodium chloride 0.9%    Review of Systems   Constitutional: Positive for activity change, appetite change and fatigue. Negative for chills and fever.   HENT: Negative.  Negative for congestion and facial swelling.    Eyes: Negative for pain, discharge and visual disturbance.   Respiratory: Negative for cough, chest tightness, shortness of breath and wheezing.    Cardiovascular: Negative for chest pain, palpitations and leg swelling.   Gastrointestinal: Positive for abdominal distention, abdominal pain and nausea. Negative for constipation.   Endocrine: Negative.    Genitourinary: Positive for decreased urine volume. Negative for difficulty urinating.        Anuric   Musculoskeletal: Positive for back pain. Negative for arthralgias  and myalgias.   Skin: Positive for color change and wound. Negative for rash.   Allergic/Immunologic: Positive for immunocompromised state.   Neurological: Negative for dizziness, seizures, weakness and headaches.   Psychiatric/Behavioral: Positive for decreased concentration and dysphoric mood. Negative for confusion. The patient is not nervous/anxious.    All other systems reviewed and are negative.    Objective:     Vital Signs (Most Recent):  Temp: (!) 100.6 °F (38.1 °C) (02/26/18 1541)  Pulse: 103 (02/26/18 1541)  Resp: 16 (02/26/18 1541)  BP: 120/67 (02/26/18 1541)  SpO2: 98 % (02/26/18 1541) Vital Signs (24h Range):  Temp:  [98.6 °F (37 °C)-100.8 °F (38.2 °C)] 100.6 °F (38.1 °C)  Pulse:  [] 103  Resp:  [16-24] 16  SpO2:  [91 %-98 %] 98 %  BP: (110-150)/(67-85) 120/67     Weight: 69.8 kg (153 lb 14.1 oz)  Body mass index is 24.84 kg/m².    Intake/Output - Last 3 Shifts       02/24 0700 - 02/25 0659 02/25 0700 - 02/26 0659 02/26 0700 - 02/27 0659    P.O. 600 900 180    Other   600    IV Piggyback 350 350     .4 1296.3 405.2    Total Intake(mL/kg) 1537.4 (22.6) 2546.3 (36.5) 1185.2 (17)    Urine (mL/kg/hr) 0 (0) 125 (0.1) 100 (0.2)    Emesis/NG output  0 (0) 0 (0)    Other  0 (0) 2600 (4.1)    Stool 0 (0) 0 (0) 0 (0)    Blood  0 (0) 0 (0)    Total Output 0 125 2700    Net +1537.4 +2421.3 -1514.8           Urine Occurrence 0 x 0 x 0 x    Stool Occurrence 1 x 0 x 0 x    Emesis Occurrence  0 x 0 x          Physical Exam   Constitutional: He is oriented to person, place, and time. He appears well-developed. No distress.   Temporal and distal extremity muscle wasting   HENT:   Head: Normocephalic and atraumatic.   Mouth/Throat: No oropharyngeal exudate.   dylan tube   Eyes: EOM are normal. Pupils are equal, round, and reactive to light. Scleral icterus is present.   Neck: Normal range of motion. Neck supple. No JVD present. No thyromegaly present.   Cardiovascular: Normal rate, regular rhythm, normal heart  sounds and intact distal pulses.    No murmur heard.  Pulmonary/Chest: Effort normal. No respiratory distress. He has decreased breath sounds in the right middle field, the right lower field and the left lower field. He has no wheezes. He exhibits no tenderness.   Diminished to RLL   Abdominal: Soft. Bowel sounds are normal. He exhibits ascites. He exhibits no distension. There is tenderness. There is no rebound and no guarding.   Dressing to chevron.  Wd vac removed 1/12/18  Dependent edema present R flank; surrounding erythema   Musculoskeletal: Normal range of motion. He exhibits edema (2+ LE edema). He exhibits no tenderness.   Neurological: He is alert and oriented to person, place, and time. He has normal reflexes.   Skin: Skin is warm and dry. Capillary refill takes 2 to 3 seconds. He is not diaphoretic. No erythema.   jaundice   Psychiatric: He has a normal mood and affect. His behavior is normal. Judgment and thought content normal. His mood appears not anxious.   Depressed mood   Nursing note and vitals reviewed.      Laboratory:  Immunosuppressants         Stop Route Frequency     tacrolimus capsule 1 mg      -- Oral 2 times daily        CBC:     Recent Labs  Lab 02/26/18  1418   WBC 2.56*   RBC 2.40*   HGB 7.5*   HCT 22.9*   *   MCV 95   MCH 31.3*   MCHC 32.8     CMP:     Recent Labs  Lab 02/26/18  1418   GLU 94   CALCIUM 8.5*   ALBUMIN 1.8*   PROT 5.2*      K 3.8   CO2 29      BUN 9   CREATININE 2.8*   ALKPHOS 92   ALT <5*   AST 22   BILITOT 1.7*     Labs within the past 24 hours have been reviewed.    Diagnostic Results:  pertinent imaging reviewed

## 2018-02-26 NOTE — PROGRESS NOTES
[facility]  Nephrology  Progress Note    Patient Name: Jhonny Diana  MRN: [id number]  Admission Date: 1/11/2018  Hospital Length of Stay: 46 days  Attending Provider: Nathanael Medina MD   Primary Care Physician: Primary Doctor No  Principal Problem:Fever    Subjective:     HPI: Jhonny Diana is a 26 y/o  male with PMHx relevant for ETOH cirrhosis.  S/p DDLT 10/19/2017; c/b seizures (swtiched off prograf to cyclo), ATN dialysis dependant in iHD MWF, last HD on 1/10, anuric, superficial wound infection s/p wound vac to chevron incision, and multiple admissions for fevers finally dx with peritonitis in November (WBC 5000, 75% PNM) neg for bile leak. HE gets recurrent paracentesis and has peritoneal drain. Other pertinent PMH current wound vac in place 2/2 wound infection, malnutrition requiring TPN for short course and ongoing hypoalbuminemia, and seizure activity while on prograf and has since been switched to cyclosporine without reoccurrence.  He is admitted to LTx service secondary to fever, abdominal pain, and N/V. He reports fever (103) for 1 day prior. Overnight, he developed N/V, reports small amount of green emesis with new left sided pain. He also endorses worsening SOB with exertion. He was scheduled as an outpatient for follow up paracentesis and IR drainage of fluid collection. CXR in ER shows large pleural effusion with subsegmental atelectasis. His ANC is 900. Broad spectrum antibiotics initiated in ED. Nephrology consulted for LAURA dialysis dependant co management. He dialyses at [facility] ED under the care of Dr. Peterson via IMAN rowe.      Interval History:   Patient evaluated at bedside in NINO, blood pressures where stable during dialysis, no complaints.    Review of patient's allergies indicates:   Allergen Reactions    Bactrim [sulfamethoxazole-trimethoprim] Other (See Comments)     Mookie Trell Syndrome     Current Facility-Administered Medications   Medication Frequency     0.9%  NaCl infusion PRN    acetaminophen tablet 650 mg Q6H PRN    albuterol-ipratropium 2.5mg-0.5mg/3mL nebulizer solution 3 mL Q4H PRN    bisacodyl EC tablet 10 mg Daily    bisacodyl suppository 10 mg Daily PRN    dextrose 50% injection 12.5 g PRN    dextrose 50% injection 25 g PRN    docusate sodium capsule 100 mg BID    dronabinol capsule 2.5 mg Daily    epoetin maurisio injection 10,000 Units Every Mon, Wed, Fri    ertapenem (INVANZ) 0.5 g in sodium chloride 0.9% 100 mL IVPB Q24H    glucagon (human recombinant) injection 1 mg PRN    glucose chewable tablet 16 g PRN    glucose chewable tablet 24 g PRN    heparin (porcine) injection 1,000 Units PRN    heparin (porcine) injection 5,000 Units Q8H    isavuconazonium sulfate 372 mg in sodium chloride 0.9% 250 mL Q24H    levetiracetam oral soln Soln 500 mg BID    levothyroxine tablet 75 mcg Before breakfast    metoclopramide HCl 5 mg/5 mL solution 10 mg QID (AC & HS)    multivitamin tablet 1 tablet Daily    omeprazole capsule 40 mg QAM    ondansetron disintegrating tablet 8 mg Q6H PRN    ondansetron injection 4 mg Q6H PRN    oxyCODONE immediate release tablet 10 mg Q6H PRN    polyethylene glycol packet 17 g BID    prochlorperazine injection Soln 10 mg Q6H PRN    psyllium husk (aspartame) 3.4 gram PwPk 1 packet BID    simethicone chewable tablet 80 mg TID PRN    sodium chloride 0.9% flush 3 mL PRN    tacrolimus capsule 1 mg BID    TPN ADULT CENTRAL LINE CUSTOM Continuous    ursodiol oral suspension (conc: 60 mg/mL) 300 mg BID       Objective:     Vital Signs (Most Recent):  Temp: 98.6 °F (37 °C) (02/26/18 1217)  Pulse: 107 (02/26/18 1217)  Resp: 18 (02/26/18 1217)  BP: 110/68 (02/26/18 1217)  SpO2: (!) 94 % (02/26/18 1217)  O2 Device (Oxygen Therapy): room air (02/26/18 0342) Vital Signs (24h Range):  Temp:  [98.6 °F (37 °C)-99.6 °F (37.6 °C)] 98.6 °F (37 °C)  Pulse:  [] 107  Resp:  [16-18] 18  SpO2:  [93 %-98 %] 94 %  BP:  (110-150)/(68-85) 110/68     Weight: 69.8 kg (153 lb 14.1 oz) (02/26/18 0355)  Body mass index is 24.84 kg/m².  Body surface area is 1.8 meters squared.    I/O last 3 completed shifts:  In: 3723.7 [P.O.:1140; IV Piggyback:700]  Out: 125 [Urine:125]    Physical Exam   Constitutional: He is oriented to person, place, and time. He appears well-developed. No distress.   HENT:   Head: Normocephalic and atraumatic.   Eyes: Conjunctivae are normal. Pupils are equal, round, and reactive to light.   Neck: Trachea normal. Neck supple. No JVD present.   Cardiovascular: Normal rate, regular rhythm, S1 normal, S2 normal and normal pulses.    Pulmonary/Chest: Effort normal and breath sounds normal.   Abdominal: Soft. Bowel sounds are normal. He exhibits distension, fluid wave and ascites.   Musculoskeletal: Normal range of motion. He exhibits edema.   Neurological: He is alert and oriented to person, place, and time.   Skin: Skin is warm and dry.   Vitals reviewed.      Significant Labs:  ABGs: No results for input(s): PH, PCO2, HCO3, POCSATURATED, BE in the last 168 hours.  BMP:   Recent Labs  Lab 02/26/18  0409   GLU 83      CO2 21*   BUN 20   CREATININE 4.9*   CALCIUM 8.8   MG 1.8     CBC:   Recent Labs  Lab 02/26/18  0409   WBC 2.68*   RBC 2.36*   HGB 7.5*   HCT 22.7*      MCV 96   MCH 31.8*   MCHC 33.0     CMP:   Recent Labs  Lab 02/26/18  0409   GLU 83   CALCIUM 8.8   ALBUMIN 1.7*   PROT 4.9*      K 3.5   CO2 21*      BUN 20   CREATININE 4.9*   ALKPHOS 86   ALT <5*   AST 25   BILITOT 1.7*     All labs within the past 24 hours have been reviewed.       Assessment/Plan:     Acute renal failure with tubular necrosis    LAURA dialysisi dependant since 1016/2017  HD on [facility] under the cre of Dr. Peterson  On HD for: 2.5 mo since 10/16/2017  Duration of outpatient dialysis session - 3.5 hrs  EDW - TBD  Residual Renal Function - yes     Plan:  - Will provide dialysis for metabolic clearance and volume  management in am via RIJ-PermCath, duration 3 hrs, UF 1-2 L as tolerated by patient, maintain MAP>65, will adjust bath to chem.   - Blood pressures stable          Win Sandoval  Nephrology  Fellow  [facility]    Pager [phone] have reviewed and concur with the fellow's hemodialysis prescription plan. I have personally interviewed and examined the patient at bedside during hemodialysis session.

## 2018-02-26 NOTE — SUBJECTIVE & OBJECTIVE
Interval History:   Patient evaluated at bedside in NINO, blood pressures where stable during dialysis, no complaints.    Review of patient's allergies indicates:   Allergen Reactions    Bactrim [sulfamethoxazole-trimethoprim] Other (See Comments)     Mookie Trell Syndrome     Current Facility-Administered Medications   Medication Frequency    0.9%  NaCl infusion PRN    acetaminophen tablet 650 mg Q6H PRN    albuterol-ipratropium 2.5mg-0.5mg/3mL nebulizer solution 3 mL Q4H PRN    bisacodyl EC tablet 10 mg Daily    bisacodyl suppository 10 mg Daily PRN    dextrose 50% injection 12.5 g PRN    dextrose 50% injection 25 g PRN    docusate sodium capsule 100 mg BID    dronabinol capsule 2.5 mg Daily    epoetin maurisio injection 10,000 Units Every Mon, Wed, Fri    ertapenem (INVANZ) 0.5 g in sodium chloride 0.9% 100 mL IVPB Q24H    glucagon (human recombinant) injection 1 mg PRN    glucose chewable tablet 16 g PRN    glucose chewable tablet 24 g PRN    heparin (porcine) injection 1,000 Units PRN    heparin (porcine) injection 5,000 Units Q8H    isavuconazonium sulfate 372 mg in sodium chloride 0.9% 250 mL Q24H    levetiracetam oral soln Soln 500 mg BID    levothyroxine tablet 75 mcg Before breakfast    metoclopramide HCl 5 mg/5 mL solution 10 mg QID (AC & HS)    multivitamin tablet 1 tablet Daily    omeprazole capsule 40 mg QAM    ondansetron disintegrating tablet 8 mg Q6H PRN    ondansetron injection 4 mg Q6H PRN    oxyCODONE immediate release tablet 10 mg Q6H PRN    polyethylene glycol packet 17 g BID    prochlorperazine injection Soln 10 mg Q6H PRN    psyllium husk (aspartame) 3.4 gram PwPk 1 packet BID    simethicone chewable tablet 80 mg TID PRN    sodium chloride 0.9% flush 3 mL PRN    tacrolimus capsule 1 mg BID    TPN ADULT CENTRAL LINE CUSTOM Continuous    ursodiol oral suspension (conc: 60 mg/mL) 300 mg BID       Objective:     Vital Signs (Most Recent):  Temp: 98.6 °F (37 °C)  (02/26/18 1217)  Pulse: 107 (02/26/18 1217)  Resp: 18 (02/26/18 1217)  BP: 110/68 (02/26/18 1217)  SpO2: (!) 94 % (02/26/18 1217)  O2 Device (Oxygen Therapy): room air (02/26/18 0342) Vital Signs (24h Range):  Temp:  [98.6 °F (37 °C)-99.6 °F (37.6 °C)] 98.6 °F (37 °C)  Pulse:  [] 107  Resp:  [16-18] 18  SpO2:  [93 %-98 %] 94 %  BP: (110-150)/(68-85) 110/68     Weight: 69.8 kg (153 lb 14.1 oz) (02/26/18 0355)  Body mass index is 24.84 kg/m².  Body surface area is 1.8 meters squared.    I/O last 3 completed shifts:  In: 3723.7 [P.O.:1140; IV Piggyback:700]  Out: 125 [Urine:125]    Physical Exam   Constitutional: He is oriented to person, place, and time. He appears well-developed. No distress.   HENT:   Head: Normocephalic and atraumatic.   Eyes: Conjunctivae are normal. Pupils are equal, round, and reactive to light.   Neck: Trachea normal. Neck supple. No JVD present.   Cardiovascular: Normal rate, regular rhythm, S1 normal, S2 normal and normal pulses.    Pulmonary/Chest: Effort normal and breath sounds normal.   Abdominal: Soft. Bowel sounds are normal. He exhibits distension, fluid wave and ascites.   Musculoskeletal: Normal range of motion. He exhibits edema.   Neurological: He is alert and oriented to person, place, and time.   Skin: Skin is warm and dry.   Vitals reviewed.      Significant Labs:  ABGs: No results for input(s): PH, PCO2, HCO3, POCSATURATED, BE in the last 168 hours.  BMP:   Recent Labs  Lab 02/26/18  0409   GLU 83      CO2 21*   BUN 20   CREATININE 4.9*   CALCIUM 8.8   MG 1.8     CBC:   Recent Labs  Lab 02/26/18  0409   WBC 2.68*   RBC 2.36*   HGB 7.5*   HCT 22.7*      MCV 96   MCH 31.8*   MCHC 33.0     CMP:   Recent Labs  Lab 02/26/18  0409   GLU 83   CALCIUM 8.8   ALBUMIN 1.7*   PROT 4.9*      K 3.5   CO2 21*      BUN 20   CREATININE 4.9*   ALKPHOS 86   ALT <5*   AST 25   BILITOT 1.7*     All labs within the past 24 hours have been reviewed.

## 2018-02-26 NOTE — PLAN OF CARE
Problem: Patient Care Overview  Goal: Plan of Care Review  Outcome: Ongoing (interventions implemented as appropriate)  Patient AAO, very flat.  Patient is free of injury from falls at this time, one person assist with ambulation due to weakness.  Afebrile today.  Patient received dialysis this am, 2L removed, tolerated well.  Complains of back pain, prn pain medication given.  Indium scan in progress.  Patients appetite is very poor was unable to eat this am, patient tolerating TPN and lipids.  No nausea noted thia am.  Scant UO, 100 ccs noted.  Patients mother at the bedside attentive to patient involved in patients care.

## 2018-02-26 NOTE — ASSESSMENT & PLAN NOTE
LAURA dialysisi dependant since 1016/2017  HD on [facility] under the cre of Dr. Peterson  On HD for: 2.5 mo since 10/16/2017  Duration of outpatient dialysis session - 3.5 hrs  EDW - TBD  Residual Renal Function - yes     Plan:  - Will provide dialysis for metabolic clearance and volume management in am via RIJ-PermCath, duration 3 hrs, UF 1-2 L as tolerated by patient, maintain MAP>65, will adjust bath to chem.   - Blood pressures stable

## 2018-02-26 NOTE — PROGRESS NOTES
Pt arrived via stretcher.  Placed on cardiac monitor and b/p check every 15 minutes.  Right IJ Dialysis access prep with prevantic swab.  maintenace Hemodialysis started per orders.

## 2018-02-26 NOTE — ASSESSMENT & PLAN NOTE
- Started on vanc/cefepime for ERCP. Now transitioned to vanc/zosyn.  - Blood cultures sent, prelim NGTD. Repeat blood cultures and chest xray today 2/10.  - thoracentesis 2/1 - fluid negative for infection.  - atelectasis noted on ct scan - start breathing tx & CPT.  - now cmv positive - started treatment 2/5/18.  - taken to OR 2/7 for exp lap - fluid collections drained, cultures pending.  - Patient with low grade fever 2/15, 100.3.  -CT A/P 2/15 with no evidence of IA fluid infection. Did show R pleural effusion s/p thoracentesis, cell count negative for infection, cx pending.  -Repeat blood cx 2/15 prelim NGTD, CMV PCR 2/15 negative   Complaining of abdominal pain. Is tender in RUQ.  -Remains with fevers  - Bilirubin remains elevated. Concerning for cholangitis. Antibiotics broadened 2/17. ID re consulted.   - ERCP  2/19, overall unremarkable findings. Did place new larger stents. Will need repeat ERCP in 10 weeks. Bilirubin is stable post ERCP.  -Tmax 101 2/21 PM again ID following. Plan to transition vanc/zosyn to ertapenem 2/22 as no improvement noted.   - Has been afebrile since switch to Erta until 2/26 PM spiked 100.6  - will obtain tagged WBC scan Monday per ID recs.

## 2018-02-26 NOTE — PROGRESS NOTES
Dialysis complete.  Blood returned.  Right chest wall    CVC flushed * 2 with   Saline and heparin  Locked with cap and tape.  No s/s infection at cvc site.   Pt tolerated hemodialysis without diff.  Pt ran   3 Hrs on hemodialysis machine.   Took off  2000 Ml net volume.      Used F-160 dialyzer.

## 2018-02-27 LAB
ALBUMIN SERPL BCP-MCNC: 1.7 G/DL
ALP SERPL-CCNC: 88 U/L
ALT SERPL W/O P-5'-P-CCNC: <5 U/L
ANION GAP SERPL CALC-SCNC: 8 MMOL/L
ANISOCYTOSIS BLD QL SMEAR: SLIGHT
AST SERPL-CCNC: 22 U/L
BACTERIA #/AREA URNS AUTO: NORMAL /HPF
BASOPHILS # BLD AUTO: ABNORMAL K/UL
BASOPHILS NFR BLD: 0 %
BILIRUB SERPL-MCNC: 1.5 MG/DL
BILIRUB UR QL STRIP: NEGATIVE
BUN SERPL-MCNC: 15 MG/DL
CALCIUM SERPL-MCNC: 8.1 MG/DL
CHLORIDE SERPL-SCNC: 105 MMOL/L
CLARITY UR REFRACT.AUTO: CLEAR
CO2 SERPL-SCNC: 26 MMOL/L
COLOR UR AUTO: YELLOW
CREAT SERPL-MCNC: 3.5 MG/DL
DIFFERENTIAL METHOD: ABNORMAL
EOSINOPHIL # BLD AUTO: ABNORMAL K/UL
EOSINOPHIL NFR BLD: 0 %
ERYTHROCYTE [DISTWIDTH] IN BLOOD BY AUTOMATED COUNT: 17.7 %
EST. GFR  (AFRICAN AMERICAN): 25.9 ML/MIN/1.73 M^2
EST. GFR  (NON AFRICAN AMERICAN): 22.4 ML/MIN/1.73 M^2
GIANT PLATELETS BLD QL SMEAR: PRESENT
GLUCOSE SERPL-MCNC: 92 MG/DL
GLUCOSE UR QL STRIP: NEGATIVE
HCT VFR BLD AUTO: 22.1 %
HGB BLD-MCNC: 7.3 G/DL
HGB UR QL STRIP: NEGATIVE
HYALINE CASTS UR QL AUTO: 1 /LPF
HYPOCHROMIA BLD QL SMEAR: ABNORMAL
IMM GRANULOCYTES # BLD AUTO: ABNORMAL K/UL
IMM GRANULOCYTES NFR BLD AUTO: ABNORMAL %
KETONES UR QL STRIP: NEGATIVE
LEUKOCYTE ESTERASE UR QL STRIP: NEGATIVE
LEVETIRACETAM SERPL-MCNC: 19.6 UG/ML (ref 3–60)
LEVETIRACETAM SERPL-MCNC: 29.5 UG/ML (ref 3–60)
LYMPHOCYTES # BLD AUTO: ABNORMAL K/UL
LYMPHOCYTES NFR BLD: 42 %
MAGNESIUM SERPL-MCNC: 2 MG/DL
MCH RBC QN AUTO: 32.3 PG
MCHC RBC AUTO-ENTMCNC: 33 G/DL
MCV RBC AUTO: 98 FL
MICROSCOPIC COMMENT: NORMAL
MONOCYTES # BLD AUTO: ABNORMAL K/UL
MONOCYTES NFR BLD: 10 %
NEUTROPHILS NFR BLD: 46 %
NEUTS BAND NFR BLD MANUAL: 2 %
NITRITE UR QL STRIP: NEGATIVE
NRBC BLD-RTO: 0 /100 WBC
OVALOCYTES BLD QL SMEAR: ABNORMAL
PH UR STRIP: 7 [PH] (ref 5–8)
PHOSPHATE SERPL-MCNC: 1.2 MG/DL
PLATELET # BLD AUTO: 159 K/UL
PMV BLD AUTO: 10.9 FL
POIKILOCYTOSIS BLD QL SMEAR: SLIGHT
POLYCHROMASIA BLD QL SMEAR: ABNORMAL
POTASSIUM SERPL-SCNC: 3.6 MMOL/L
PROT SERPL-MCNC: 5 G/DL
PROT UR QL STRIP: ABNORMAL
RBC # BLD AUTO: 2.26 M/UL
RBC #/AREA URNS AUTO: 2 /HPF (ref 0–4)
SODIUM SERPL-SCNC: 139 MMOL/L
SP GR UR STRIP: 1.01 (ref 1–1.03)
TACROLIMUS BLD-MCNC: 5.6 NG/ML
TACROLIMUS BLD-MCNC: 5.6 NG/ML
URN SPEC COLLECT METH UR: ABNORMAL
UROBILINOGEN UR STRIP-ACNC: NEGATIVE EU/DL
WBC # BLD AUTO: 2.33 K/UL
WBC #/AREA URNS AUTO: 2 /HPF (ref 0–5)

## 2018-02-27 PROCEDURE — 99900035 HC TECH TIME PER 15 MIN (STAT)

## 2018-02-27 PROCEDURE — 63600175 PHARM REV CODE 636 W HCPCS: Mod: JG | Performed by: SURGERY

## 2018-02-27 PROCEDURE — 99233 SBSQ HOSP IP/OBS HIGH 50: CPT | Mod: ,,, | Performed by: INTERNAL MEDICINE

## 2018-02-27 PROCEDURE — 87086 URINE CULTURE/COLONY COUNT: CPT

## 2018-02-27 PROCEDURE — 20600001 HC STEP DOWN PRIVATE ROOM

## 2018-02-27 PROCEDURE — 63600175 PHARM REV CODE 636 W HCPCS: Performed by: PHYSICIAN ASSISTANT

## 2018-02-27 PROCEDURE — 25000003 PHARM REV CODE 250: Performed by: PHYSICIAN ASSISTANT

## 2018-02-27 PROCEDURE — 25000003 PHARM REV CODE 250: Performed by: STUDENT IN AN ORGANIZED HEALTH CARE EDUCATION/TRAINING PROGRAM

## 2018-02-27 PROCEDURE — 80197 ASSAY OF TACROLIMUS: CPT

## 2018-02-27 PROCEDURE — 63600175 PHARM REV CODE 636 W HCPCS: Performed by: NURSE PRACTITIONER

## 2018-02-27 PROCEDURE — 80053 COMPREHEN METABOLIC PANEL: CPT

## 2018-02-27 PROCEDURE — 63600175 PHARM REV CODE 636 W HCPCS: Performed by: RADIOLOGY

## 2018-02-27 PROCEDURE — B4185 PARENTERAL SOL 10 GM LIPIDS: HCPCS | Performed by: PHYSICIAN ASSISTANT

## 2018-02-27 PROCEDURE — 85007 BL SMEAR W/DIFF WBC COUNT: CPT

## 2018-02-27 PROCEDURE — 81001 URINALYSIS AUTO W/SCOPE: CPT

## 2018-02-27 PROCEDURE — 85027 COMPLETE CBC AUTOMATED: CPT

## 2018-02-27 PROCEDURE — 83735 ASSAY OF MAGNESIUM: CPT

## 2018-02-27 PROCEDURE — 86077 PHYS BLOOD BANK SERV XMATCH: CPT | Mod: ,,, | Performed by: PATHOLOGY

## 2018-02-27 PROCEDURE — 94664 DEMO&/EVAL PT USE INHALER: CPT

## 2018-02-27 PROCEDURE — 25000003 PHARM REV CODE 250: Performed by: NURSE PRACTITIONER

## 2018-02-27 PROCEDURE — 84100 ASSAY OF PHOSPHORUS: CPT

## 2018-02-27 PROCEDURE — 25000003 PHARM REV CODE 250: Performed by: SURGERY

## 2018-02-27 PROCEDURE — 99233 SBSQ HOSP IP/OBS HIGH 50: CPT | Mod: ,,, | Performed by: PHYSICIAN ASSISTANT

## 2018-02-27 PROCEDURE — A4217 STERILE WATER/SALINE, 500 ML: HCPCS | Performed by: PHYSICIAN ASSISTANT

## 2018-02-27 RX ORDER — SODIUM CHLORIDE 9 MG/ML
INJECTION, SOLUTION INTRAVENOUS
Status: DISCONTINUED | OUTPATIENT
Start: 2018-02-28 | End: 2018-03-01

## 2018-02-27 RX ORDER — FENTANYL CITRATE 50 UG/ML
INJECTION, SOLUTION INTRAMUSCULAR; INTRAVENOUS CODE/TRAUMA/SEDATION MEDICATION
Status: COMPLETED | OUTPATIENT
Start: 2018-02-27 | End: 2018-02-27

## 2018-02-27 RX ORDER — MIDAZOLAM HYDROCHLORIDE 1 MG/ML
INJECTION INTRAMUSCULAR; INTRAVENOUS CODE/TRAUMA/SEDATION MEDICATION
Status: COMPLETED | OUTPATIENT
Start: 2018-02-27 | End: 2018-02-27

## 2018-02-27 RX ORDER — SODIUM CHLORIDE 9 MG/ML
INJECTION, SOLUTION INTRAVENOUS ONCE
Status: COMPLETED | OUTPATIENT
Start: 2018-02-28 | End: 2018-02-28

## 2018-02-27 RX ADMIN — ONDANSETRON HYDROCHLORIDE 4 MG: 2 INJECTION, SOLUTION INTRAMUSCULAR; INTRAVENOUS at 10:02

## 2018-02-27 RX ADMIN — OXYCODONE HYDROCHLORIDE 10 MG: 5 TABLET ORAL at 10:02

## 2018-02-27 RX ADMIN — OXYCODONE HYDROCHLORIDE 10 MG: 5 TABLET ORAL at 03:02

## 2018-02-27 RX ADMIN — TACROLIMUS 1 MG: 1 CAPSULE ORAL at 08:02

## 2018-02-27 RX ADMIN — URSODIOL 300 MG: 300 CAPSULE ORAL at 10:02

## 2018-02-27 RX ADMIN — METOCLOPRAMIDE HYDROCHLORIDE 10 MG: 5 SOLUTION ORAL at 06:02

## 2018-02-27 RX ADMIN — TACROLIMUS 1 MG: 1 CAPSULE ORAL at 05:02

## 2018-02-27 RX ADMIN — OMEPRAZOLE 40 MG: 40 CAPSULE, DELAYED RELEASE ORAL at 06:02

## 2018-02-27 RX ADMIN — FENTANYL CITRATE 50 MCG: 50 INJECTION, SOLUTION INTRAMUSCULAR; INTRAVENOUS at 01:02

## 2018-02-27 RX ADMIN — BISACODYL 10 MG: 10 SUPPOSITORY RECTAL at 10:02

## 2018-02-27 RX ADMIN — DOCUSATE SODIUM 100 MG: 100 CAPSULE, LIQUID FILLED ORAL at 08:02

## 2018-02-27 RX ADMIN — OXYCODONE HYDROCHLORIDE 10 MG: 5 TABLET ORAL at 04:02

## 2018-02-27 RX ADMIN — POTASSIUM PHOSPHATE, MONOBASIC AND POTASSIUM PHOSPHATE, DIBASIC 30 MMOL: 224; 236 INJECTION, SOLUTION, CONCENTRATE INTRAVENOUS at 08:02

## 2018-02-27 RX ADMIN — METOCLOPRAMIDE HYDROCHLORIDE 10 MG: 5 SOLUTION ORAL at 03:02

## 2018-02-27 RX ADMIN — SODIUM CHLORIDE: 9 INJECTION, SOLUTION INTRAVENOUS at 12:02

## 2018-02-27 RX ADMIN — ACETAMINOPHEN 650 MG: 325 TABLET, FILM COATED ORAL at 04:02

## 2018-02-27 RX ADMIN — LEVETIRACETAM 500 MG: 100 SOLUTION ORAL at 08:02

## 2018-02-27 RX ADMIN — BISACODYL 10 MG: 5 TABLET, COATED ORAL at 08:02

## 2018-02-27 RX ADMIN — ACETAMINOPHEN 650 MG: 325 TABLET, FILM COATED ORAL at 03:02

## 2018-02-27 RX ADMIN — MIDAZOLAM HYDROCHLORIDE 1 MG: 1 INJECTION, SOLUTION INTRAMUSCULAR; INTRAVENOUS at 01:02

## 2018-02-27 RX ADMIN — LEVOTHYROXINE SODIUM 75 MCG: 75 TABLET ORAL at 06:02

## 2018-02-27 RX ADMIN — THERA TABS 1 TABLET: TAB at 08:02

## 2018-02-27 RX ADMIN — METOCLOPRAMIDE HYDROCHLORIDE 10 MG: 5 SOLUTION ORAL at 10:02

## 2018-02-27 RX ADMIN — SOYBEAN OIL 250 ML: 20 INJECTION, SOLUTION INTRAVENOUS at 09:02

## 2018-02-27 RX ADMIN — SODIUM CHLORIDE 0.5 G: 0.9 INJECTION, SOLUTION INTRAVENOUS at 10:02

## 2018-02-27 RX ADMIN — SODIUM CHLORIDE: 234 INJECTION INTRAMUSCULAR; INTRAVENOUS; SUBCUTANEOUS at 09:02

## 2018-02-27 RX ADMIN — ONDANSETRON HYDROCHLORIDE 4 MG: 2 INJECTION, SOLUTION INTRAMUSCULAR; INTRAVENOUS at 08:02

## 2018-02-27 RX ADMIN — DRONABINOL 2.5 MG: 2.5 CAPSULE ORAL at 05:02

## 2018-02-27 RX ADMIN — LEVETIRACETAM 500 MG: 100 SOLUTION ORAL at 09:02

## 2018-02-27 NOTE — PROCEDURES
Dr. Bains evaluated the patient with real time ultrasound to assess for a drain able fluid collection, limited fluid interposed with bowel was seen in the RLQ, and there was no safe pocket for drain placement. If there is continued concern or progression on another imaging study, please reconsult for re-evaluation.

## 2018-02-27 NOTE — PROGRESS NOTES
Temp 100.4 tylenol given.  Patient complained of back pain, oxycodone given.  Will continue to monitor.

## 2018-02-27 NOTE — ASSESSMENT & PLAN NOTE
- Started on vanc/cefepime for ERCP. Now transitioned to vanc/zosyn.  - Blood cultures sent, prelim NGTD. Repeat blood cultures and chest xray today 2/10.  - thoracentesis 2/1 - fluid negative for infection.  - atelectasis noted on ct scan - start breathing tx & CPT.  - now cmv positive - started treatment 2/5/18.  - taken to OR 2/7 for exp lap - fluid collections drained, cultures pending.  - Patient with low grade fever 2/15, 100.3.  -CT A/P 2/15 with no evidence of IA fluid infection. Did show R pleural effusion s/p thoracentesis, cell count negative for infection, cx pending.  -Repeat blood cx 2/15 prelim NGTD, CMV PCR 2/15 negative   Complaining of abdominal pain. Is tender in RUQ.  -Remains with fevers  - Bilirubin remains elevated. Concerning for cholangitis. Antibiotics broadened 2/17. ID re consulted.   - ERCP  2/19, overall unremarkable findings. Did place new larger stents. Will need repeat ERCP in 10 weeks. Bilirubin is stable post ERCP.  -Tmax 101 2/21 PM again ID following. Plan to transition vanc/zosyn to ertapenem 2/22 as no improvement noted.   - Erta started 2/22.  -CT A/P obtained 2/26 with focal/loculated region of fluid in the lower right abdomen measuring approximately 8.7 x 5.5 cm. Plan for IR drainage of collection today with tube placement.  - Tagged WBC scan 2/27, results pending.

## 2018-02-27 NOTE — SUBJECTIVE & OBJECTIVE
Interval History: spiked fever overnight of 101F, no overall changes in clinical picture    Review of Systems   Constitutional: Negative for chills, fatigue and fever.   HENT: Negative for sore throat.    Respiratory: Negative for cough, chest tightness and shortness of breath.    Cardiovascular: Negative for chest pain, palpitations and leg swelling.   Gastrointestinal: Positive for abdominal pain. Negative for abdominal distention, diarrhea, nausea and vomiting.   Genitourinary: Negative for dysuria, flank pain and urgency.   Skin: Negative for rash.   Neurological: Negative for dizziness, light-headedness and numbness.   Psychiatric/Behavioral: Negative for confusion.     Objective:     Vital Signs (Most Recent):  Temp: 98.7 °F (37.1 °C) (02/27/18 1300)  Pulse: 87 (02/27/18 1353)  Resp: 16 (02/27/18 1353)  BP: 135/73 (02/27/18 1353)  SpO2: 96 % (02/27/18 1353) Vital Signs (24h Range):  Temp:  [98.7 °F (37.1 °C)-101 °F (38.3 °C)] 98.7 °F (37.1 °C)  Pulse:  [] 87  Resp:  [10-24] 16  SpO2:  [91 %-99 %] 96 %  BP: (120-143)/(65-89) 135/73     Weight: 67.7 kg (149 lb 4 oz)  Body mass index is 24.09 kg/m².    Estimated Creatinine Clearance: 28.4 mL/min (A) (based on SCr of 3.5 mg/dL (H)).    Physical Exam   Constitutional: He is oriented to person, place, and time.   HENT:   Mouth/Throat: No oropharyngeal exudate.   Eyes: No scleral icterus.   Cardiovascular: Normal rate and regular rhythm.    Pulmonary/Chest: Effort normal and breath sounds normal. No respiratory distress. He has no wheezes. He has no rales.   Abdominal: Soft. There is tenderness. There is no rebound.   Musculoskeletal: He exhibits edema.   Lymphadenopathy:     He has no cervical adenopathy.   Neurological: He is alert and oriented to person, place, and time.   Skin: He is not diaphoretic.       Significant Labs:   Bilirubin:     Recent Labs  Lab 02/24/18  0506 02/25/18  0349 02/26/18  0409 02/26/18  1418 02/27/18  0500   BILITOT 1.9* 1.6* 1.7*  1.7* 1.5*     Blood Culture:     Recent Labs  Lab 01/30/18  1916 02/11/18  1236 02/11/18  1241 02/15/18  1729 02/21/18  0001   LABBLOO No growth after 5 days.  No growth after 5 days. No growth after 5 days. No growth after 5 days. No growth after 5 days.  No growth after 5 days. No growth after 5 days.  No growth after 5 days.     BMP:     Recent Labs  Lab 02/27/18  0500   GLU 92      K 3.6      CO2 26   BUN 15   CREATININE 3.5*   CALCIUM 8.1*   MG 2.0     CBC:     Recent Labs  Lab 02/26/18  0409 02/26/18  1418 02/27/18  0500   WBC 2.68* 2.56* 2.33*   HGB 7.5* 7.5* 7.3*   HCT 22.7* 22.9* 22.1*    149* 159     CMP:     Recent Labs  Lab 02/26/18  0409 02/26/18  1418 02/27/18  0500    140 139   K 3.5 3.8 3.6    105 105   CO2 21* 29 26   GLU 83 94 92   BUN 20 9 15   CREATININE 4.9* 2.8* 3.5*   CALCIUM 8.8 8.5* 8.1*   PROT 4.9* 5.2* 5.0*   ALBUMIN 1.7* 1.8* 1.7*   BILITOT 1.7* 1.7* 1.5*   ALKPHOS 86 92 88   AST 25 22 22   ALT <5* <5* <5*   ANIONGAP 12 6* 8   EGFRNONAA 14.9* 29.4* 22.4*     Microbiology Results (last 7 days)     Procedure Component Value Units Date/Time    Urine culture [[id number]] Collected:  02/27/18 1000    Order Status:  Sent Specimen:  Urine from Urine, Clean Catch Updated:  02/27/18 1411    Culture, Anaerobe [[id number]]     Order Status:  No result Specimen:  Body Fluid from Peritoneal Fluid     Gram stain [[id number]]     Order Status:  No result Specimen:  Body Fluid from Peritoneal Fluid     Fungus culture [[id number]]     Order Status:  No result Specimen:  Body Fluid from Peritoneal Fluid     Aerobic culture [[id number]]     Order Status:  No result Specimen:  Body Fluid from Peritoneal Fluid     Blood culture [[id number]] Collected:  02/21/18 0001    Order Status:  Completed Specimen:  Blood Updated:  02/26/18 0612     Blood Culture, Routine No growth after 5 days.    Blood culture [[id number]] Collected:  02/21/18 0001    Order Status:  Completed Specimen:  Blood  Updated:  02/26/18 0612     Blood Culture, Routine No growth after 5 days.    Culture, Anaerobic [[id number]] Collected:  02/16/18 1411    Order Status:  Completed Specimen:  Body Fluid from Pleural Fluid Updated:  02/23/18 0951     Anaerobic Culture No anaerobes isolated    AFB Culture & Smear [[id number]] Collected:  01/11/18 1335    Order Status:  Completed Specimen:  Pleural Fluid from Lung, Left Updated:  02/23/18 0927     AFB Culture & Smear Culture in progress     AFB Culture & Smear Culture in progress     AFB CULTURE STAIN No acid fast bacilli seen.    Fungus culture [[id number]] Collected:  02/07/18 1452    Order Status:  Completed Specimen:  Body Fluid from Peritoneal Fluid Updated:  02/22/18 0810     Fungus (Mycology) Culture Culture in progress     Fungus (Mycology) Culture No fungus isolated after 2 weeks    Fungus culture [[id number]] Collected:  02/16/18 1411    Order Status:  Completed Specimen:  Body Fluid from Pleural Fluid Updated:  02/22/18 0717     Fungus (Mycology) Culture Culture in progress    Narrative:       Right    Blood culture [[id number]] Collected:  02/15/18 1729    Order Status:  Completed Specimen:  Blood Updated:  02/20/18 2012     Blood Culture, Routine No growth after 5 days.    Narrative:       Collection has been rescheduled by SAN1 at 2/15/2018 16:57 Reason:   Patient getting procedure; doctor asked for lab to return later.  Collection has been rescheduled by SAN1 at 2/15/2018 16:57 Reason:   Patient getting procedure; doctor asked for lab to return later.    Blood culture [[id number]] Collected:  02/15/18 1729    Order Status:  Completed Specimen:  Blood Updated:  02/20/18 2012     Blood Culture, Routine No growth after 5 days.    Narrative:       Collection has been rescheduled by SAN1 at 2/15/2018 16:57 Reason:   Patient getting procedure; doctor asked for lab to return later.  Collection has been rescheduled by SAN1 at 2/15/2018 16:57 Reason:   Patient getting procedure; doctor  asked for lab to return later.          Significant Imaging: I have reviewed all pertinent imaging results/findings within the past 24 hours.

## 2018-02-27 NOTE — PROGRESS NOTES
Patient returned from indium scan, no acute distress noted at this time. Will continue to monitor.

## 2018-02-27 NOTE — PROGRESS NOTES
[facility]  Liver Transplant  Progress Note    Patient Name: Jhonny Diana  MRN: [id number]  Admission Date: 2018  Hospital Length of Stay: 47 days  Code Status: Full Code  Primary Care Provider: Primary Doctor No  Post-Operative Day: 131    ORGAN:   LIVER  Disease Etiology: Acute Alcoholic Hepatitis  Donor Type:    - Brain Death  CDC High Risk:   No  Donor CMV Status:   Donor CMV Status: Positive  Donor HBcAB:   Negative  Donor HCV Status:   Negative  Whole or Partial: Whole Liver  Biliary Anastomosis: End to End  Arterial Anatomy: Standard  Subjective:     History of Present Illness:  Jhonny Diana is a 26 y/o male with past medical history of alcoholic cirrhosis.  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 Found to have peritonitis in November (WBC 5000, 75% PNM) neg for bile leak. He was treated initially with vanc/cefepime. Repeat cell counts  with some improvement (WBC 1400, 45% PNM). He has undergone multiple paracenteses as well as abscess drainage of perihepatic fluid collections and treated with antimicrobial therapy but no positive cultures. Of note, biliary stricture also identified and ERCP performed on 2017 with sphincterotomy and biliary stent placed. Liver tests still have not normalized despite intervention, bilirubin and AP remain elevated. Other pertinent PMH current wound vac in place 2/2 wound infection, malnutrition requiring TPN for short course and ongoing hypoalbuminemia, and seizure activity while on prograf and has since been switched to cyclosporine without reoccurrence.  He presented to the ER for fever, abdominal pain, and N/V. He reports fever (103) for 1  day prior. Overnight, he developed N/V, reports small amount of green emesis with new left sided pain. He also endorses worsening SOB with exertion. He was scheduled as an outpatient for follow up paracentesis and IR drainage of fluid collection. CXR in ER shows large pleural effusion with subsegmental atelectasis. Infectious work up initiated in ER. His ANC is 900. Broad spectrum antibiotics initiated in ED. He denies chest pain, palpitations, diarrhea, constipation, or back pain. Denies any sick contacts.    Hospital Course:  Thoracentesis (1.2L off), Paracentesis (1.6L off), and IR drainage of fluid collection 1/11, all fluids negative for infection. ID consulted. Broad spectrum antibiotics d/c'd 1/15. Chronically malnourished with poor PO intake, prealbumin 7. Shane tube placed 1/15 for tube feedings.     ERCP 1/17 with sludge and a biliary stone which was removed and stent exchanged.  Remained with n/v; therefore, TF remained on hold and TPN continued.   Pt with fever s/p ERCP on 1/17 which continued until 1/19.  Vanc/cefepime restarted.  Blood cx 1/17 and 1/18 NGTD.  ID reconsulted.  Fungal markers sent.  CT C/A/P obtained.  With large R pleural effusion and ascites- both drained 1/19 and negative for infx per cell count. Pt afebrile 1/20. Liver biopsy 1/23 - without rejection.  EGD 1/25 - unremarkable for source of GI symptoms.  Resume diet along with other GI recommendations. SHANE tube placed for tube feeds.       2/22: Pt again spiked temp of 101 overnight, now down to 99. Resent Bcx overnight, prelim NGTD. Transitioned to Erta from vanc/zosyn per ID. Planning for tagged WBC scan on Monday. R sided abdominal erythema/dependent edema improving today.  TF have been on hold since 2/20. Will continue to hold today per pt request. Pt working on diet. KUB yesterday with significant stool and gas. Declines colace, miralax, metamucil, but willing to take daily bisacodyl. Pt agreeable to suppository today. If  suppository not successful can give mag citrate through dylan tonight. Will also retrial Marinol tonight as pt reported improvement in appetite previously. Will monitor closely for sedation.     2/23:  2 small BM 2/22.  Tolerated HD.  Dylan removed.  Will attempt to eat this PM.      2/24:  Pt ate then vomited hamburger 2/23.  Will resume TPN.  D/C atovaquone, transition cyclo to Prograf, check Keppra level, increase Reglan to 0 mg, change twyla to BID for nausea.  Will give pentam today.    2/25:  TPN going well, pt ate 1/2 subway sandwhich without vomiting yesterday, will continue to monitor.  Plan to repeat EGD this week.  Pt with drop in H/h again and appears to be hemolyzing.  LDH elevated, hapto slightly low, LEE pending.  With neutropenia- Neupogen given.    2/26:  Pt with severe back pain s/p HD. Unclear if secondary to neupogen.  Repeat labs stable. Indium scan scheduled for 2/27- injection done today (back pain started prior). CT A/P obtained with fluid collection seen in pelvis.    2/27: Patient spiking fevers, 101 this AM. Back pain improved today. Indium scan performed today, awaiting results. CT A/P obtained 2/26 with focal/loculated region of fluid in the lower right abdomen measuring approximately 8.7 x 5.5 cm. Plan for IR drainage of collection today with tube placement. Will also potential plan for EGD later this week as remains with nausea with food intake. Monitor.     Scheduled Meds:   bisacodyl  10 mg Oral Daily    cyclobenzaprine  5 mg Oral Once    docusate sodium  100 mg Oral BID    dronabinol  2.5 mg Oral Daily    epoetin maurisio (PROCRIT) injection  10,000 Units Intravenous Every Mon, Wed, Fri    ertapenem (INVANZ) IVPB  500 mg Intravenous Q24H    fat emulsion 20%  250 mL Intravenous Daily    heparin (porcine)  5,000 Units Subcutaneous Q8H    custom IVPB builder   Intravenous Q24H    levetiracetam oral soln  500 mg Oral BID    levothyroxine  75 mcg Oral Before breakfast     metoclopramide HCl  10 mg Oral QID (AC & HS)    multivitamin  1 tablet Oral Daily    omeprazole  40 mg Oral QAM    polyethylene glycol  17 g Oral BID    potassium phosphate IVPB  30 mmol Intravenous Once    psyllium husk (aspartame)  3.4 g Oral BID    tacrolimus  1 mg Oral BID    ursodiol  300 mg Oral BID     Continuous Infusions:   TPN ADULT CENTRAL LINE CUSTOM 48 mL/hr at 02/26/18 2143    TPN ADULT CENTRAL LINE CUSTOM       PRN Meds:sodium chloride 0.9%, acetaminophen, albuterol-ipratropium 2.5mg-0.5mg/3mL, bisacodyl, dextrose 50%, dextrose 50%, fentaNYL, glucagon (human recombinant), glucose, glucose, heparin (porcine), midazolam, naloxone, ondansetron, ondansetron, oxyCODONE, prochlorperazine, simethicone, sodium chloride 0.9%    Review of Systems   Constitutional: Positive for activity change, appetite change, fatigue and fever. Negative for chills.   HENT: Negative.  Negative for congestion and facial swelling.    Eyes: Negative for pain, discharge and visual disturbance.   Respiratory: Negative for cough, chest tightness, shortness of breath and wheezing.    Cardiovascular: Negative for chest pain, palpitations and leg swelling.   Gastrointestinal: Positive for abdominal distention, abdominal pain and nausea. Negative for constipation.   Endocrine: Negative.    Genitourinary: Positive for decreased urine volume. Negative for difficulty urinating.        Anuric   Musculoskeletal: Positive for back pain (improved). Negative for arthralgias and myalgias.   Skin: Positive for color change and wound. Negative for rash.   Allergic/Immunologic: Positive for immunocompromised state.   Neurological: Negative for dizziness, seizures, weakness and headaches.   Psychiatric/Behavioral: Positive for decreased concentration and dysphoric mood. Negative for confusion. The patient is not nervous/anxious.    All other systems reviewed and are negative.    Objective:     Vital Signs (Most Recent):  Temp: 98.7 °F (37.1  °C) (02/27/18 1300)  Pulse: 89 (02/27/18 1320)  Resp: 19 (02/27/18 1320)  BP: (!) 143/89 (02/27/18 1320)  SpO2: 96 % (02/27/18 1320) Vital Signs (24h Range):  Temp:  [98.7 °F (37.1 °C)-101 °F (38.3 °C)] 98.7 °F (37.1 °C)  Pulse:  [] 89  Resp:  [10-24] 19  SpO2:  [91 %-99 %] 96 %  BP: (120-143)/(65-89) 143/89     Weight: 67.7 kg (149 lb 4 oz)  Body mass index is 24.09 kg/m².    Intake/Output - Last 3 Shifts       02/25 0700 - 02/26 0659 02/26 0700 - 02/27 0659 02/27 0700 - 02/28 0659    P.O. 900 360 160    I.V. (mL/kg)  0 (0) 0 (0)    Other  600 0    IV Piggyback 350 350 500    TPN 1296.3 838.2 288    Total Intake(mL/kg) 2546.3 (36.5) 2148.2 (31.7) 948 (14)    Urine (mL/kg/hr) 125 (0.1) 100 (0.1) 50 (0.1)    Emesis/NG output 0 (0) 0 (0) 0 (0)    Other 0 (0) 2600 (1.6) 0 (0)    Stool 0 (0) 0 (0) 0 (0)    Blood 0 (0) 0 (0) 0 (0)    Total Output 125 2700 50    Net +2421.3 -551.8 +898           Urine Occurrence 0 x 0 x 0 x    Stool Occurrence 0 x 0 x 0 x    Emesis Occurrence 0 x 0 x 0 x          Physical Exam   Constitutional: He is oriented to person, place, and time. He appears well-developed. No distress.   Temporal and distal extremity muscle wasting   HENT:   Head: Normocephalic and atraumatic.   Mouth/Throat: No oropharyngeal exudate.   dylan tube   Eyes: EOM are normal. Pupils are equal, round, and reactive to light. Scleral icterus is present.   Neck: Normal range of motion. Neck supple. No JVD present. No thyromegaly present.   Cardiovascular: Normal rate, regular rhythm, normal heart sounds and intact distal pulses.    No murmur heard.  Pulmonary/Chest: Effort normal. No respiratory distress. He has decreased breath sounds in the right middle field, the right lower field and the left lower field. He has no wheezes. He exhibits no tenderness.   Diminished to RLL   Abdominal: Soft. Bowel sounds are normal. He exhibits ascites. He exhibits no distension. There is tenderness. There is no rebound and no  guarding.   Dressing to chevron.  Wd vac removed 1/12/18  Dependent edema present R flank; surrounding erythema   Musculoskeletal: Normal range of motion. He exhibits edema (2+ LE edema). He exhibits no tenderness.   Neurological: He is alert and oriented to person, place, and time. He has normal reflexes.   Skin: Skin is warm and dry. Capillary refill takes 2 to 3 seconds. He is not diaphoretic. No erythema.   jaundice   Psychiatric: He has a normal mood and affect. His behavior is normal. Judgment and thought content normal. His mood appears not anxious.   Depressed mood   Nursing note and vitals reviewed.      Laboratory:  Immunosuppressants         Stop Route Frequency     tacrolimus capsule 1 mg      -- Oral 2 times daily        CBC:     Recent Labs  Lab 02/27/18  0500   WBC 2.33*   RBC 2.26*   HGB 7.3*   HCT 22.1*      MCV 98   MCH 32.3*   MCHC 33.0     CMP:     Recent Labs  Lab 02/27/18  0500   GLU 92   CALCIUM 8.1*   ALBUMIN 1.7*   PROT 5.0*      K 3.6   CO2 26      BUN 15   CREATININE 3.5*   ALKPHOS 88   ALT <5*   AST 22   BILITOT 1.5*     Labs within the past 24 hours have been reviewed.    Diagnostic Results:  pertinent imaging reviewed    Assessment/Plan:     Other neutropenia    - Neupogen given 2/25  - Pt with severe back pain 2/26 --> unclear if source        Other cytomegaloviral diseases    - detected at 370 on CMV PCR 2/1.  - case discussed with ID and will hold off on treatment at this time given low WBC.   - undetected CMV PCR 2/8.  - continue Valcyte 200 mg every Mon, Wed, Fri.  - CMV PCR 2/15 negative  - Per ID, valcyte dose decreased to ppx dose on 2/18.  - CMV PCR 2/22 negative  - Repeat scope this week as pt with leukopenia, h/o CMV, and burning pain despite negative PCR        Fever    - Started on vanc/cefepime for ERCP. Now transitioned to vanc/zosyn.  - Blood cultures sent, prelim NGTD. Repeat blood cultures and chest xray today 2/10.  - thoracentesis 2/1 - fluid  negative for infection.  - atelectasis noted on ct scan - start breathing tx & CPT.  - now cmv positive - started treatment 2/5/18.  - taken to OR 2/7 for exp lap - fluid collections drained, cultures pending.  - Patient with low grade fever 2/15, 100.3.  -CT A/P 2/15 with no evidence of IA fluid infection. Did show R pleural effusion s/p thoracentesis, cell count negative for infection, cx pending.  -Repeat blood cx 2/15 prelim NGTD, CMV PCR 2/15 negative   Complaining of abdominal pain. Is tender in RUQ.  -Remains with fevers  - Bilirubin remains elevated. Concerning for cholangitis. Antibiotics broadened 2/17. ID re consulted.   - ERCP  2/19, overall unremarkable findings. Did place new larger stents. Will need repeat ERCP in 10 weeks. Bilirubin is stable post ERCP.  -Tmax 101 2/21 PM again ID following. Plan to transition vanc/zosyn to ertapenem 2/22 as no improvement noted.   - Erta started 2/22.  -CT A/P obtained 2/26 with focal/loculated region of fluid in the lower right abdomen measuring approximately 8.7 x 5.5 cm. Plan for IR drainage of collection today with tube placement.  - Tagged WBC scan 2/27, results pending.             Constipation    - Enema ordered 1/20 and 1/21 with BM.  - Encourage ambulation.  - decrease narcotics.  - d/c metamucil and miralax bid as makes patient nauseated.  - increase reglan qid.  - small bowel follow through with normal findings.   - patient reports last BM 2/19   -schedule bisacodyl  -KUB 2/21 with significant amounts of stool and gas. Pt declines miralax, metamucil, colace.   -encouraged suppository.  - Improved on KUB 2/23        Other ascites    - Paracentesis performed 1/11/18 with 1600 ml removed.    - fluid negative for infection.   - Repeat para 1/19 negative for infection per cell count.  - no fluid seen for paracentesis on 1/23.        Severe protein-calorie malnutrition    - Poor PO intake since transplant requiring short course of TPN during previous hospital  stay.   - albumin remains decreased but appetite slowly improving per pt.   - Dietary consulted. Will need to closely monitor PO intake.   - supplements also ordered.   - SHANE with tube feeding start 1/16. Stopped 1/17/18, was not tolerating. [place] removed 1/20.  - prealbumin 7 on 1/15.  - TPN started 1/16/18.   - Pt able to eat minimally 1/21 and 1/22 which is an improvement.  - gi consulted - EGD reviewed, increased ppi bid, miralax bid, and metamucil bid, decreased narcotics.  - continue reglan, placed SHANE tube with tube feeds - transitioned to nightly feeds. Not tolerating well due to nausea.   - small bowel follow through with normal findings.   - consulted gi for possible gj tube placement, holding off on this for now as with ongoing fevers.  -Dietary changed tube feeds back to 24 hours from nightly 2/19 as not eating enough during the day to meet caloric needs. TF on hold since 2/20.  - retrial marinol 2/22 as pt reports that's the only thing helping his appetite, in the evening 2/2 concern for possible drowsiness  - pt feels marinol is helping  - Pt vomited 2/23 after trying hamburger  - Start TPN 2/24- continue  - Pt able to 1/4 sandwich without vomiting 2/25          Nausea and vomiting    - h/o constipation, reports having regular BM's with bowel regimen.  - vomiting episode x 1 at home. Anti-emetics ordered PRN.  - KUB repeated 1/14/18 given increased abdominal pain- mild, generalized bowel distention suggesting ileus.  Diet changed to clears as tolerated for bowel rest .  - SHANE tube placed 1/16 for tube feeds. Not tolerating tube feeds, stopped 1/17/18.  - D/C shane 1/19, now in place as of 1/25.  - chronic constipation, Cont bowel regimen.   - See protein calorie malnutrition.  - TF have been on hold since 2/20 for N/V.   - D/C shane 2/23 as not tolerating TF  - 2/24 changes: Increased Reglan to 10 mg, D/C atovaquone, transitioned cyclo to Prograf, change twyla to BID, check Keppra level  - Potential plan for  repeat EGD this week as pt reports burning pain in stomach            Recurrent pleural effusion on right    - CXR in ER with large right pleural effusion with subjective c/o worsening SOB  - thoracentesis completed- 1200 ml removed.  SOB with significant improvement.   - Cell count reviewed and negative for infection.   - resp even and non labored.  O2 sat 94-98%.  - Repeat thora 1/19 negative for infection per cell count.  - Reaccumulating fluid on xray 1/21.  - thoracentesis 1/31 - 1.4L removed, negative for infection  - Reaccumulating fluid again on xray 2/10.   -Thoracentesis performed  2/16 with 1500 cc off, cell count neg for infection, cx no growth to date.   -CT A/P 2/26 with large R pleural effusion, may need repeat thora this week.         Biliary stricture of transplanted liver    - tbili with increase prompting ERCP 1/30 with stones and sludge, stents placed.  -  Repeat ERCP 2/19 as was having recurrent fevers with bilirubin stuck at 2.0-2.2 with overall unremarkable findings seen. Did place new larger stents. Will need repeat ERCP in 10 weeks.         Anemia of chronic disease    - H/H stable. Cont to monitor with daily cbc.         Delayed surgical wound healing    - wd vac removed 1/12/18.  Wound healing well. Aquacel AG and Mepilex border dressing applied via wound care recs.           At risk for opportunistic infections    - CMV detected 2/1 now on tx dose valcyte. CMV PCR 2/15 not detected. CMV PCR 2/22, negative   - Pentam given 2/24            Long-term use of immunosuppressant medication    - Maintenance IS with cyclosporine -->transitioned to Prograf 2/24 today for continued nausea.  - MMF on hold for infections and neutropenia.   - Continue to check cyclosporine and Prograf level daily during transition. Assess for toxicity and adjust level as needed.        Prophylactic immunotherapy    - See long term use of immunosuppression.         Liver transplanted    - Post op course complicated by  fevers and hyperbilirubinemia.  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  - AST/ALT normal. Tbili/alkphos remain elevated.  - Liver US 1/8 showed 3.9 cm complex fluid collection anterior to right lobe and moderate nonspecific complex ascites inferior to transplant.   - IR placed drain 1/11/18, cell count negative for infection.  - PBS consulted. PBS not comfortable proceeding with EUS with liver biopsy given fever and possible infectious process going on. ERCP 1/17 with stone and sludge. Pt placed on Actigall - treated with 10 day course of abx.  ID now signed off.  - Bili elevated - liver u/s 1/28  - Liver biopsy 1/23 without rejection.  - consulted PBS for ERCP - Choledocholithiasis was found along with sludge, stents placed 1/30.  - ERCP 2/19, overall unremarkable findings. Did place new larger stents. Will need repeat ERCP in 10 weeks. Bilirubin improving        Acute renal failure with tubular necrosis    - HD resumed on previous admission. Now anuric and dialyzes M-W-F.  - Nephrology following.    - ktm consulted for possible kidney transplant.            VTE Risk Mitigation         Ordered     heparin (porcine) injection 1,000 Units  As needed (PRN)     Route:  Intra-Catheter        02/05/18 0945     heparin (porcine) injection 5,000 Units  Every 8 hours     Route:  Subcutaneous        01/31/18 0957     Medium Risk of VTE  Once      01/11/18 0351     Place sequential compression device  Until discontinued      01/11/18 0351          The patients clinical status was discussed at multidisplinary rounds, involving transplant surgery, transplant medicine, pharmacy, nursing, nutrition, and social work    Discharge Planning: Not a candidate for discharge at this time.       Citlali Bridges PA-C  Liver Transplant  [facility]

## 2018-02-27 NOTE — SEDATION DOCUMENTATION
Per Dr. Bains no fluid seen via ultrasound to proceed with procedure. Report called to primary nurse. Pt to be transferred to [facility], report to be given at bedside.

## 2018-02-27 NOTE — PLAN OF CARE
Problem: Patient Care Overview  Goal: Plan of Care Review  Outcome: Ongoing (interventions implemented as appropriate)  AAOx3, tmax 99.4, c/o pain. Pain controlled by prn oxycodone.  TPN and lipids administered. IV antibiotic and fungal administered. Plan for Indium scan today. Pt in lowest position, side rails up x2, non-skid foot wear in place, call light within reach, pt verbalized understanding to call RN when needed.  Hand hygiene practiced per protocol. Pt able to position self independently. Mother at bedside. Will continue to monitor.

## 2018-02-27 NOTE — PROGRESS NOTES
Patient complained of pain 7/10 to back and nausea.  PRN po pain  Medication and zofran administered.  Will continue to monitor.

## 2018-02-27 NOTE — PROGRESS NOTES
Patient transported down to IR for drain placement, no acute distress noted at this time.  Will monitor for patients return.

## 2018-02-27 NOTE — PROGRESS NOTES
Patient brought down by RN to CT.  No acute distress noted throughout trip.  Patient returned to room, no acute distress noted vitals stable.  Will continue to monitor.

## 2018-02-27 NOTE — NURSING
Pt spiked temp of 101 @ 0449. Tylenol given. Rechecked in 30 minutes. Pt now 99.0 Will continue to monitor.

## 2018-02-27 NOTE — PROGRESS NOTES
[facility]  Infectious Disease  Progress Note    Patient Name: Jhonny Diana  MRN: [id number]  Admission Date: 1/11/2018  Length of Stay: 47 days  Attending Physician: Nathanael Medina MD  Primary Care Provider: Primary Doctor No    Isolation Status: No active isolations  Assessment/Plan:      Fever    26 y/o man w/a history of asthma and alcoholic cirrhosis (c/b HE, EV, portal HTN, and HRS; s/p DDLT 10/19/2017, CMV D+/R+, steroid induction, on maintenance tacro/MMF/pred; c/b seizures, LAURA, superificial wound infection s/p wound vac to [facility] through 1/12, and several recent admissions on 11/24 and 12/3 with culture negative peritonitis with peak WBC ~5k due to suspected indolent biliary leakage s/p sphincterotomy/biliary stent placement over CBD stricture on 12/6 ERCP although notably with no overt leak noted during procedure) who was admitted on 1/11/2017 with acute onset fevers and acute on chronic N/V/abdominal pain (RLQ worst) in the setting of persistent noninflammatory ascites of unknown etiology. He underwent repeated drainage of pleural fluid and ascites that did not show evidence of gross infection and given that fevers were antibiotic responsive and walled off biloma was suspected, he underwent ex-lap on 2/8/18 where a walled off presumed partly bilious collection was noted in the RLQ and debrided (cultures negative). ERCP 2/19/18: 2 biliary tree stents removed, 2 stents placed on CBD.    - WBC scan 2/27/18 negative  - A/P CT scan 2/27/18: focal/loculated region of fluid in the lower right abdomen measuring approximately 8.7 x 5.5 cm  - will follow drainage cell count/cultures  - continue Ertapenem, patient is stable despite fever                Anticipated Disposition: pending    Thank you for your consult. I will follow-up with patient. Please contact us if you have any additional questions.    Rk Che MD  Infectious Disease Fellow, PGY-5  Spectra: [phone]  Pager:  [phone]  [facility]    Subjective:     Principal Problem:Fever    HPI: No notes on file  Interval History: spiked fever overnight of 101F, no overall changes in clinical picture    Review of Systems   Constitutional: Negative for chills, fatigue and fever.   HENT: Negative for sore throat.    Respiratory: Negative for cough, chest tightness and shortness of breath.    Cardiovascular: Negative for chest pain, palpitations and leg swelling.   Gastrointestinal: Positive for abdominal pain. Negative for abdominal distention, diarrhea, nausea and vomiting.   Genitourinary: Negative for dysuria, flank pain and urgency.   Skin: Negative for rash.   Neurological: Negative for dizziness, light-headedness and numbness.   Psychiatric/Behavioral: Negative for confusion.     Objective:     Vital Signs (Most Recent):  Temp: 98.7 °F (37.1 °C) (02/27/18 1300)  Pulse: 87 (02/27/18 1353)  Resp: 16 (02/27/18 1353)  BP: 135/73 (02/27/18 1353)  SpO2: 96 % (02/27/18 1353) Vital Signs (24h Range):  Temp:  [98.7 °F (37.1 °C)-101 °F (38.3 °C)] 98.7 °F (37.1 °C)  Pulse:  [] 87  Resp:  [10-24] 16  SpO2:  [91 %-99 %] 96 %  BP: (120-143)/(65-89) 135/73     Weight: 67.7 kg (149 lb 4 oz)  Body mass index is 24.09 kg/m².    Estimated Creatinine Clearance: 28.4 mL/min (A) (based on SCr of 3.5 mg/dL (H)).    Physical Exam   Constitutional: He is oriented to person, place, and time.   HENT:   Mouth/Throat: No oropharyngeal exudate.   Eyes: No scleral icterus.   Cardiovascular: Normal rate and regular rhythm.    Pulmonary/Chest: Effort normal and breath sounds normal. No respiratory distress. He has no wheezes. He has no rales.   Abdominal: Soft. There is tenderness. There is no rebound.   Musculoskeletal: He exhibits edema.   Lymphadenopathy:     He has no cervical adenopathy.   Neurological: He is alert and oriented to person, place, and time.   Skin: He is not diaphoretic.       Significant Labs:   Bilirubin:     Recent  Labs  Lab 02/24/18  0506 02/25/18  0349 02/26/18  0409 02/26/18  1418 02/27/18  0500   BILITOT 1.9* 1.6* 1.7* 1.7* 1.5*     Blood Culture:     Recent Labs  Lab 01/30/18  1916 02/11/18  1236 02/11/18  1241 02/15/18  1729 02/21/18  0001   LABBLOO No growth after 5 days.  No growth after 5 days. No growth after 5 days. No growth after 5 days. No growth after 5 days.  No growth after 5 days. No growth after 5 days.  No growth after 5 days.     BMP:     Recent Labs  Lab 02/27/18  0500   GLU 92      K 3.6      CO2 26   BUN 15   CREATININE 3.5*   CALCIUM 8.1*   MG 2.0     CBC:     Recent Labs  Lab 02/26/18  0409 02/26/18  1418 02/27/18  0500   WBC 2.68* 2.56* 2.33*   HGB 7.5* 7.5* 7.3*   HCT 22.7* 22.9* 22.1*    149* 159     CMP:     Recent Labs  Lab 02/26/18  0409 02/26/18  1418 02/27/18  0500    140 139   K 3.5 3.8 3.6    105 105   CO2 21* 29 26   GLU 83 94 92   BUN 20 9 15   CREATININE 4.9* 2.8* 3.5*   CALCIUM 8.8 8.5* 8.1*   PROT 4.9* 5.2* 5.0*   ALBUMIN 1.7* 1.8* 1.7*   BILITOT 1.7* 1.7* 1.5*   ALKPHOS 86 92 88   AST 25 22 22   ALT <5* <5* <5*   ANIONGAP 12 6* 8   EGFRNONAA 14.9* 29.4* 22.4*     Microbiology Results (last 7 days)     Procedure Component Value Units Date/Time    Urine culture [[id number]] Collected:  02/27/18 1000    Order Status:  Sent Specimen:  Urine from Urine, Clean Catch Updated:  02/27/18 1411    Culture, Anaerobe [[id number]]     Order Status:  No result Specimen:  Body Fluid from Peritoneal Fluid     Gram stain [[id number]]     Order Status:  No result Specimen:  Body Fluid from Peritoneal Fluid     Fungus culture [[id number]]     Order Status:  No result Specimen:  Body Fluid from Peritoneal Fluid     Aerobic culture [[id number]]     Order Status:  No result Specimen:  Body Fluid from Peritoneal Fluid     Blood culture [[id number]] Collected:  02/21/18 0001    Order Status:  Completed Specimen:  Blood Updated:  02/26/18 0612     Blood Culture, Routine No growth  after 5 days.    Blood culture [[id number]] Collected:  02/21/18 0001    Order Status:  Completed Specimen:  Blood Updated:  02/26/18 0612     Blood Culture, Routine No growth after 5 days.    Culture, Anaerobic [[id number]] Collected:  02/16/18 1411    Order Status:  Completed Specimen:  Body Fluid from Pleural Fluid Updated:  02/23/18 0951     Anaerobic Culture No anaerobes isolated    AFB Culture & Smear [[id number]] Collected:  01/11/18 1335    Order Status:  Completed Specimen:  Pleural Fluid from Lung, Left Updated:  02/23/18 0927     AFB Culture & Smear Culture in progress     AFB Culture & Smear Culture in progress     AFB CULTURE STAIN No acid fast bacilli seen.    Fungus culture [[id number]] Collected:  02/07/18 1452    Order Status:  Completed Specimen:  Body Fluid from Peritoneal Fluid Updated:  02/22/18 0810     Fungus (Mycology) Culture Culture in progress     Fungus (Mycology) Culture No fungus isolated after 2 weeks    Fungus culture [[id number]] Collected:  02/16/18 1411    Order Status:  Completed Specimen:  Body Fluid from Pleural Fluid Updated:  02/22/18 0717     Fungus (Mycology) Culture Culture in progress    Narrative:       Right    Blood culture [[id number]] Collected:  02/15/18 1729    Order Status:  Completed Specimen:  Blood Updated:  02/20/18 2012     Blood Culture, Routine No growth after 5 days.    Narrative:       Collection has been rescheduled by SAN1 at 2/15/2018 16:57 Reason:   Patient getting procedure; doctor asked for lab to return later.  Collection has been rescheduled by SAN1 at 2/15/2018 16:57 Reason:   Patient getting procedure; doctor asked for lab to return later.    Blood culture [[id number]] Collected:  02/15/18 1729    Order Status:  Completed Specimen:  Blood Updated:  02/20/18 2012     Blood Culture, Routine No growth after 5 days.    Narrative:       Collection has been rescheduled by SAN1 at 2/15/2018 16:57 Reason:   Patient getting procedure; doctor asked for lab to  return later.  Collection has been rescheduled by SANDerrick at 2/15/2018 16:57 Reason:   Patient getting procedure; doctor asked for lab to return later.          Significant Imaging: I have reviewed all pertinent imaging results/findings within the past 24 hours.

## 2018-02-27 NOTE — ASSESSMENT & PLAN NOTE
- CXR in ER with large right pleural effusion with subjective c/o worsening SOB  - thoracentesis completed- 1200 ml removed.  SOB with significant improvement.   - Cell count reviewed and negative for infection.   - resp even and non labored.  O2 sat 94-98%.  - Repeat thora 1/19 negative for infection per cell count.  - Reaccumulating fluid on xray 1/21.  - thoracentesis 1/31 - 1.4L removed, negative for infection  - Reaccumulating fluid again on xray 2/10.   -Thoracentesis performed  2/16 with 1500 cc off, cell count neg for infection, cx no growth to date.   -CT A/P 2/26 with large R pleural effusion, may need repeat thora this week.

## 2018-02-27 NOTE — ASSESSMENT & PLAN NOTE
- h/o constipation, reports having regular BM's with bowel regimen.  - vomiting episode x 1 at home. Anti-emetics ordered PRN.  - KUB repeated 1/14/18 given increased abdominal pain- mild, generalized bowel distention suggesting ileus.  Diet changed to clears as tolerated for bowel rest .  - SHANE tube placed 1/16 for tube feeds. Not tolerating tube feeds, stopped 1/17/18.  - D/C shane 1/19, now in place as of 1/25.  - chronic constipation, Cont bowel regimen.   - See protein calorie malnutrition.  - TF have been on hold since 2/20 for N/V.   - D/C shane 2/23 as not tolerating TF  - 2/24 changes: Increased Reglan to 10 mg, D/C atovaquone, transitioned cyclo to Prograf, change twyla to BID, check Keppra level  - Potential plan for repeat EGD this week as pt reports burning pain in stomach

## 2018-02-27 NOTE — H&P
Inpatient Radiology Pre-procedure Note    History of Present Illness:    Jhonny Diana is a 27 y.o. male with a lower abdominal fluid collection in recent CT. Patient presents to IR for ultrasound guided fluid collection drainage.    Admission H&P reviewed.    Past Medical History:   Diagnosis Date    Alcoholic hepatitis with ascites     Alcoholic hepatitis with ascites     History of hematemesis 9/28/2017    Hypertension     Renal disorder      Past Surgical History:   Procedure Laterality Date    APPENDECTOMY      ERCP      ESOPHAGOGASTRODUODENOSCOPY      LIVER TRANSPLANT         Review of Systems:   As documented in primary team H&P    Home Meds:   Prior to Admission medications    Medication Sig Start Date End Date Taking? Authorizing Provider   aspirin (ECOTRIN) 81 MG EC tablet Take 1 tablet (81 mg total) by mouth once daily. 11/8/17   Nathanael Medina MD   atovaquone (MEPRON) 750 mg/5 mL Susp Take 10 mLs (1,500 mg total) by mouth once daily. Stop on 4/17/18 10/19/17 4/17/18  Nathanael Medina MD   cycloSPORINE modified, NEORAL, 25 MG capsule Take 3 capsules (75 mg total) by mouth 2 (two) times daily. 1/9/18   Quiana Carter MD   docusate sodium (COLACE) 100 MG capsule Take 100 mg by mouth 3 (three) times daily as needed for Constipation.    Historical Provider, MD   ergocalciferol (ERGOCALCIFEROL) 50,000 unit Cap Take 1 capsule (50,000 Units total) by mouth every 7 days. 11/7/17   Nathanael Medina MD   levETIRAcetam (KEPPRA) 500 MG Tab Take 1 tablet (500 mg total) by mouth 2 (two) times daily. 11/7/17 11/7/18  Nathanael Medina MD   levothyroxine (SYNTHROID) 75 MCG tablet Take 1 tablet (75 mcg total) by mouth before breakfast. 12/21/17   Nathanael Medina MD   mirtazapine (REMERON) 7.5 MG Tab Take 1 tablet (7.5 mg total) by mouth every evening. 12/20/17   Nathanael Medina MD   multivitamin (THERAGRAN) tablet Take 1 tablet by mouth once daily. 11/7/17   Nathanael Medina MD   omeprazole (PRILOSEC) 40 MG capsule Take 1 capsule  (40 mg total) by mouth before breakfast. 1/25/18 1/25/19  Nathanael Medina MD   ondansetron (ZOFRAN-ODT) 8 MG TbDL Take 1 tablet (8 mg total) by mouth every 8 (eight) hours as needed (nausea). 1/10/18   Quiana Carter MD   oxyCODONE (ROXICODONE) 10 mg Tab immediate release tablet Take 0.5-1 tablets (5-10 mg total) by mouth every 6 (six) hours as needed for Pain. 1/10/18   Quiana Carter MD   pantoprazole (PROTONIX) 40 MG tablet Take 1 tablet (40 mg total) by mouth once daily. 11/8/17   Nathanael Medina MD   sodium bicarbonate 650 MG tablet Take 2 tablets (1,300 mg total) by mouth 2 (two) times daily. 11/30/17   Geronimo Vera PA-C   thiamine 100 MG tablet Take 1 tablet (100 mg total) by mouth once daily. 11/7/17   Nathanael Medina MD   ursodiol (ACTIGALL) 300 mg capsule Take 1 capsule (300 mg total) by mouth 2 (two) times daily. 12/20/17   Nathanael Medina MD     Scheduled Meds:    bisacodyl  10 mg Oral Daily    cyclobenzaprine  5 mg Oral Once    docusate sodium  100 mg Oral BID    dronabinol  2.5 mg Oral Daily    epoetin maurisio (PROCRIT) injection  10,000 Units Intravenous Every Mon, Wed, Fri    ertapenem (INVANZ) IVPB  500 mg Intravenous Q24H    fat emulsion 20%  250 mL Intravenous Daily    heparin (porcine)  5,000 Units Subcutaneous Q8H    custom IVPB builder   Intravenous Q24H    levetiracetam oral soln  500 mg Oral BID    levothyroxine  75 mcg Oral Before breakfast    metoclopramide HCl  10 mg Oral QID (AC & HS)    multivitamin  1 tablet Oral Daily    omeprazole  40 mg Oral QAM    polyethylene glycol  17 g Oral BID    potassium phosphate IVPB  30 mmol Intravenous Once    psyllium husk (aspartame)  3.4 g Oral BID    tacrolimus  1 mg Oral BID    ursodiol  300 mg Oral BID     Continuous Infusions:    TPN ADULT CENTRAL LINE CUSTOM 48 mL/hr at 02/26/18 2143    TPN ADULT CENTRAL LINE CUSTOM       PRN Meds:sodium chloride 0.9%, acetaminophen, albuterol-ipratropium 2.5mg-0.5mg/3mL, bisacodyl, dextrose 50%,  dextrose 50%, fentaNYL, glucagon (human recombinant), glucose, glucose, heparin (porcine), naloxone, ondansetron, ondansetron, oxyCODONE, prochlorperazine, simethicone, sodium chloride 0.9%     Anticoagulants/Antiplatelets: Heparin 5000 units subcutaneous daily    Allergies:   Review of patient's allergies indicates:   Allergen Reactions    Bactrim [sulfamethoxazole-trimethoprim] Other (See Comments)     Mookie Trell Syndrome     Sedation Hx: have not been any systemic reactions    Labs:    INR of 1.0 on 2/16/2018    No results for input(s): INR in the last 168 hours.    Invalid input(s):  PT,  PTT    Recent Labs  Lab 02/27/18  0500   WBC 2.33*   HGB 7.3*   HCT 22.1*   MCV 98         Recent Labs  Lab 02/27/18  0500   GLU 92      K 3.6      CO2 26   BUN 15   CREATININE 3.5*   CALCIUM 8.1*   MG 2.0   ALT <5*   AST 22   ALBUMIN 1.7*   BILITOT 1.5*         Vitals:  Temp: 98.7 °F (37.1 °C) (02/27/18 1300)  Pulse: 90 (02/27/18 1300)  Resp: 10 (02/27/18 1300)  BP: (!) 142/81 (02/27/18 1300)  SpO2: 96 % (02/27/18 1300)     Physical Exam:  ASA: 3  Mallampati: 2    General: no acute distress, jaundiced  Mental Status: alert and oriented to person, place and time  HEENT: normocephalic, atraumatic  Chest: unlabored breathing  Heart: regular heart rate  Abdomen: nondistended  Extremity: moves all extremities    Plan: ultrasound guided drainage of a lower abdominal fluid collection.  Sedation Plan: moderate and local    Devante Marroquin MD  PGY-5  Department of Radiology  [phone]

## 2018-02-27 NOTE — ASSESSMENT & PLAN NOTE
28 y/o man w/a history of asthma and alcoholic cirrhosis (c/b HE, EV, portal HTN, and HRS; s/p DDLT 10/19/2017, CMV D+/R+, steroid induction, on maintenance tacro/MMF/pred; c/b seizures, LAURA, superificial wound infection s/p wound vac to [facility] through 1/12, and several recent admissions on 11/24 and 12/3 with culture negative peritonitis with peak WBC ~5k due to suspected indolent biliary leakage s/p sphincterotomy/biliary stent placement over CBD stricture on 12/6 ERCP although notably with no overt leak noted during procedure) who was admitted on 1/11/2017 with acute onset fevers and acute on chronic N/V/abdominal pain (RLQ worst) in the setting of persistent noninflammatory ascites of unknown etiology. He underwent repeated drainage of pleural fluid and ascites that did not show evidence of gross infection and given that fevers were antibiotic responsive and walled off biloma was suspected, he underwent ex-lap on 2/8/18 where a walled off presumed partly bilious collection was noted in the RLQ and debrided (cultures negative). ERCP 2/19/18: 2 biliary tree stents removed, 2 stents placed on CBD.    - WBC scan 2/27/18 negative  - A/P CT scan 2/27/18: focal/loculated region of fluid in the lower right abdomen measuring approximately 8.7 x 5.5 cm  - will follow drainage cell count/cultures  - continue Ertapenem, patient is stable despite fever

## 2018-02-27 NOTE — SUBJECTIVE & OBJECTIVE
Scheduled Meds:   bisacodyl  10 mg Oral Daily    cyclobenzaprine  5 mg Oral Once    docusate sodium  100 mg Oral BID    dronabinol  2.5 mg Oral Daily    epoetin mauriiso (PROCRIT) injection  10,000 Units Intravenous Every Mon, Wed, Fri    ertapenem (INVANZ) IVPB  500 mg Intravenous Q24H    fat emulsion 20%  250 mL Intravenous Daily    heparin (porcine)  5,000 Units Subcutaneous Q8H    custom IVPB builder   Intravenous Q24H    levetiracetam oral soln  500 mg Oral BID    levothyroxine  75 mcg Oral Before breakfast    metoclopramide HCl  10 mg Oral QID (AC & HS)    multivitamin  1 tablet Oral Daily    omeprazole  40 mg Oral QAM    polyethylene glycol  17 g Oral BID    potassium phosphate IVPB  30 mmol Intravenous Once    psyllium husk (aspartame)  3.4 g Oral BID    tacrolimus  1 mg Oral BID    ursodiol  300 mg Oral BID     Continuous Infusions:   TPN ADULT CENTRAL LINE CUSTOM 48 mL/hr at 02/26/18 2143    TPN ADULT CENTRAL LINE CUSTOM       PRN Meds:sodium chloride 0.9%, acetaminophen, albuterol-ipratropium 2.5mg-0.5mg/3mL, bisacodyl, dextrose 50%, dextrose 50%, fentaNYL, glucagon (human recombinant), glucose, glucose, heparin (porcine), midazolam, naloxone, ondansetron, ondansetron, oxyCODONE, prochlorperazine, simethicone, sodium chloride 0.9%    Review of Systems   Constitutional: Positive for activity change, appetite change, fatigue and fever. Negative for chills.   HENT: Negative.  Negative for congestion and facial swelling.    Eyes: Negative for pain, discharge and visual disturbance.   Respiratory: Negative for cough, chest tightness, shortness of breath and wheezing.    Cardiovascular: Negative for chest pain, palpitations and leg swelling.   Gastrointestinal: Positive for abdominal distention, abdominal pain and nausea. Negative for constipation.   Endocrine: Negative.    Genitourinary: Positive for decreased urine volume. Negative for difficulty urinating.        Anuric   Musculoskeletal:  Positive for back pain (improved). Negative for arthralgias and myalgias.   Skin: Positive for color change and wound. Negative for rash.   Allergic/Immunologic: Positive for immunocompromised state.   Neurological: Negative for dizziness, seizures, weakness and headaches.   Psychiatric/Behavioral: Positive for decreased concentration and dysphoric mood. Negative for confusion. The patient is not nervous/anxious.    All other systems reviewed and are negative.    Objective:     Vital Signs (Most Recent):  Temp: 98.7 °F (37.1 °C) (02/27/18 1300)  Pulse: 89 (02/27/18 1320)  Resp: 19 (02/27/18 1320)  BP: (!) 143/89 (02/27/18 1320)  SpO2: 96 % (02/27/18 1320) Vital Signs (24h Range):  Temp:  [98.7 °F (37.1 °C)-101 °F (38.3 °C)] 98.7 °F (37.1 °C)  Pulse:  [] 89  Resp:  [10-24] 19  SpO2:  [91 %-99 %] 96 %  BP: (120-143)/(65-89) 143/89     Weight: 67.7 kg (149 lb 4 oz)  Body mass index is 24.09 kg/m².    Intake/Output - Last 3 Shifts       02/25 0700 - 02/26 0659 02/26 0700 - 02/27 0659 02/27 0700 - 02/28 0659    P.O. 900 360 160    I.V. (mL/kg)  0 (0) 0 (0)    Other  600 0    IV Piggyback 350 350 500    TPN 1296.3 838.2 288    Total Intake(mL/kg) 2546.3 (36.5) 2148.2 (31.7) 948 (14)    Urine (mL/kg/hr) 125 (0.1) 100 (0.1) 50 (0.1)    Emesis/NG output 0 (0) 0 (0) 0 (0)    Other 0 (0) 2600 (1.6) 0 (0)    Stool 0 (0) 0 (0) 0 (0)    Blood 0 (0) 0 (0) 0 (0)    Total Output 125 2700 50    Net +2421.3 -551.8 +898           Urine Occurrence 0 x 0 x 0 x    Stool Occurrence 0 x 0 x 0 x    Emesis Occurrence 0 x 0 x 0 x          Physical Exam   Constitutional: He is oriented to person, place, and time. He appears well-developed. No distress.   Temporal and distal extremity muscle wasting   HENT:   Head: Normocephalic and atraumatic.   Mouth/Throat: No oropharyngeal exudate.   dylan tube   Eyes: EOM are normal. Pupils are equal, round, and reactive to light. Scleral icterus is present.   Neck: Normal range of motion. Neck supple.  No JVD present. No thyromegaly present.   Cardiovascular: Normal rate, regular rhythm, normal heart sounds and intact distal pulses.    No murmur heard.  Pulmonary/Chest: Effort normal. No respiratory distress. He has decreased breath sounds in the right middle field, the right lower field and the left lower field. He has no wheezes. He exhibits no tenderness.   Diminished to RLL   Abdominal: Soft. Bowel sounds are normal. He exhibits ascites. He exhibits no distension. There is tenderness. There is no rebound and no guarding.   Dressing to chevron.  Wd vac removed 1/12/18  Dependent edema present R flank; surrounding erythema   Musculoskeletal: Normal range of motion. He exhibits edema (2+ LE edema). He exhibits no tenderness.   Neurological: He is alert and oriented to person, place, and time. He has normal reflexes.   Skin: Skin is warm and dry. Capillary refill takes 2 to 3 seconds. He is not diaphoretic. No erythema.   jaundice   Psychiatric: He has a normal mood and affect. His behavior is normal. Judgment and thought content normal. His mood appears not anxious.   Depressed mood   Nursing note and vitals reviewed.      Laboratory:  Immunosuppressants         Stop Route Frequency     tacrolimus capsule 1 mg      -- Oral 2 times daily        CBC:     Recent Labs  Lab 02/27/18  0500   WBC 2.33*   RBC 2.26*   HGB 7.3*   HCT 22.1*      MCV 98   MCH 32.3*   MCHC 33.0     CMP:     Recent Labs  Lab 02/27/18  0500   GLU 92   CALCIUM 8.1*   ALBUMIN 1.7*   PROT 5.0*      K 3.6   CO2 26      BUN 15   CREATININE 3.5*   ALKPHOS 88   ALT <5*   AST 22   BILITOT 1.5*     Labs within the past 24 hours have been reviewed.    Diagnostic Results:  pertinent imaging reviewed

## 2018-02-27 NOTE — PROGRESS NOTES
Patient transported down for indium scan, no acute distress noted at this time.  Will monitor for patients return.

## 2018-02-27 NOTE — PLAN OF CARE
Problem: Patient Care Overview  Goal: Individualization & Mutuality  Outcome: Ongoing (interventions implemented as appropriate)  Patient AAO today.  Pain to back controlled per po oxy.  Patients mother at the bedside attentive to patient very involved in patients care.  Patient completed indium scan today.  Patient went down to IR for abcess drainage but IR was unabl to get to fluid.  T max today 99.3.  Wound care changed hydrocolloid to chevron wound.  Nausea noted today, zofran administered.  Patient has nit eaten anything today.  Tolerating TPN and lipids at night.

## 2018-02-28 ENCOUNTER — ANESTHESIA EVENT (OUTPATIENT)
Dept: ENDOSCOPY | Facility: HOSPITAL | Age: 28
End: 2018-02-28

## 2018-02-28 LAB
ABO + RH BLD: NORMAL
ALBUMIN SERPL BCP-MCNC: 1.8 G/DL
ALP SERPL-CCNC: 92 U/L
ALT SERPL W/O P-5'-P-CCNC: <5 U/L
ANION GAP SERPL CALC-SCNC: 8 MMOL/L
ANISOCYTOSIS BLD QL SMEAR: SLIGHT
AST SERPL-CCNC: 23 U/L
BACTERIA UR CULT: NO GROWTH
BASOPHILS # BLD AUTO: ABNORMAL K/UL
BASOPHILS NFR BLD: 2 %
BILIRUB SERPL-MCNC: 1.4 MG/DL
BLD GP AB SCN CELLS X3 SERPL QL: NORMAL
BLD PROD TYP BPU: NORMAL
BLOOD UNIT EXPIRATION DATE: NORMAL
BLOOD UNIT TYPE CODE: 5100
BLOOD UNIT TYPE: NORMAL
BUN SERPL-MCNC: 28 MG/DL
CALCIUM SERPL-MCNC: 8.9 MG/DL
CHLORIDE SERPL-SCNC: 102 MMOL/L
CO2 SERPL-SCNC: 26 MMOL/L
CODING SYSTEM: NORMAL
CREAT SERPL-MCNC: 4.4 MG/DL
DIFFERENTIAL METHOD: ABNORMAL
DISPENSE STATUS: NORMAL
EOSINOPHIL # BLD AUTO: ABNORMAL K/UL
EOSINOPHIL NFR BLD: 0 %
ERYTHROCYTE [DISTWIDTH] IN BLOOD BY AUTOMATED COUNT: 17.9 %
ERYTHROCYTE [SEDIMENTATION RATE] IN BLOOD BY WESTERGREN METHOD: 66 MM/HR
EST. GFR  (AFRICAN AMERICAN): 19.7 ML/MIN/1.73 M^2
EST. GFR  (NON AFRICAN AMERICAN): 17 ML/MIN/1.73 M^2
GLUCOSE SERPL-MCNC: 91 MG/DL
HCT VFR BLD AUTO: 21.1 %
HGB BLD-MCNC: 6.9 G/DL
IMM GRANULOCYTES # BLD AUTO: ABNORMAL K/UL
IMM GRANULOCYTES NFR BLD AUTO: ABNORMAL %
LYMPHOCYTES # BLD AUTO: ABNORMAL K/UL
LYMPHOCYTES NFR BLD: 47 %
MAGNESIUM SERPL-MCNC: 2.2 MG/DL
MCH RBC QN AUTO: 31.8 PG
MCHC RBC AUTO-ENTMCNC: 32.7 G/DL
MCV RBC AUTO: 97 FL
MONOCYTES # BLD AUTO: ABNORMAL K/UL
MONOCYTES NFR BLD: 17 %
NEUTROPHILS NFR BLD: 33 %
NEUTS BAND NFR BLD MANUAL: 1 %
NRBC BLD-RTO: 0 /100 WBC
PHOSPHATE SERPL-MCNC: 2.8 MG/DL
PLATELET # BLD AUTO: 154 K/UL
PLATELET BLD QL SMEAR: ABNORMAL
PMV BLD AUTO: 10.9 FL
POCT GLUCOSE: 91 MG/DL (ref 70–110)
POTASSIUM SERPL-SCNC: 3.8 MMOL/L
PROT SERPL-MCNC: 5 G/DL
RBC # BLD AUTO: 2.17 M/UL
SODIUM SERPL-SCNC: 136 MMOL/L
TACROLIMUS BLD-MCNC: 5 NG/ML
TACROLIMUS BLD-MCNC: 5 NG/ML
TRANS ERYTHROCYTES VOL PATIENT: NORMAL ML
WBC # BLD AUTO: 2.33 K/UL

## 2018-02-28 PROCEDURE — P9021 RED BLOOD CELLS UNIT: HCPCS

## 2018-02-28 PROCEDURE — 63600175 PHARM REV CODE 636 W HCPCS: Performed by: PHYSICIAN ASSISTANT

## 2018-02-28 PROCEDURE — 25000003 PHARM REV CODE 250: Performed by: SURGERY

## 2018-02-28 PROCEDURE — 25000003 PHARM REV CODE 250: Performed by: NURSE PRACTITIONER

## 2018-02-28 PROCEDURE — 94664 DEMO&/EVAL PT USE INHALER: CPT

## 2018-02-28 PROCEDURE — 63600175 PHARM REV CODE 636 W HCPCS: Mod: JG | Performed by: SURGERY

## 2018-02-28 PROCEDURE — 85651 RBC SED RATE NONAUTOMATED: CPT

## 2018-02-28 PROCEDURE — 25000003 PHARM REV CODE 250: Performed by: PHYSICIAN ASSISTANT

## 2018-02-28 PROCEDURE — 0DJ08ZZ INSPECTION OF UPPER INTESTINAL TRACT, VIA NATURAL OR ARTIFICIAL OPENING ENDOSCOPIC: ICD-10-PCS | Performed by: INTERNAL MEDICINE

## 2018-02-28 PROCEDURE — 84100 ASSAY OF PHOSPHORUS: CPT

## 2018-02-28 PROCEDURE — 97803 MED NUTRITION INDIV SUBSEQ: CPT | Performed by: DIETITIAN, REGISTERED

## 2018-02-28 PROCEDURE — A4217 STERILE WATER/SALINE, 500 ML: HCPCS | Performed by: PHYSICIAN ASSISTANT

## 2018-02-28 PROCEDURE — 80053 COMPREHEN METABOLIC PANEL: CPT

## 2018-02-28 PROCEDURE — 90935 HEMODIALYSIS ONE EVALUATION: CPT

## 2018-02-28 PROCEDURE — 20600001 HC STEP DOWN PRIVATE ROOM

## 2018-02-28 PROCEDURE — 63600175 PHARM REV CODE 636 W HCPCS: Performed by: NURSE PRACTITIONER

## 2018-02-28 PROCEDURE — 25000003 PHARM REV CODE 250: Performed by: INTERNAL MEDICINE

## 2018-02-28 PROCEDURE — 86850 RBC ANTIBODY SCREEN: CPT

## 2018-02-28 PROCEDURE — 85027 COMPLETE CBC AUTOMATED: CPT

## 2018-02-28 PROCEDURE — 96372 THER/PROPH/DIAG INJ SC/IM: CPT

## 2018-02-28 PROCEDURE — 85007 BL SMEAR W/DIFF WBC COUNT: CPT

## 2018-02-28 PROCEDURE — 80197 ASSAY OF TACROLIMUS: CPT

## 2018-02-28 PROCEDURE — 83735 ASSAY OF MAGNESIUM: CPT

## 2018-02-28 PROCEDURE — 99900035 HC TECH TIME PER 15 MIN (STAT)

## 2018-02-28 PROCEDURE — 87799 DETECT AGENT NOS DNA QUANT: CPT

## 2018-02-28 PROCEDURE — 86920 COMPATIBILITY TEST SPIN: CPT

## 2018-02-28 PROCEDURE — 99233 SBSQ HOSP IP/OBS HIGH 50: CPT | Mod: ,,, | Performed by: PHYSICIAN ASSISTANT

## 2018-02-28 PROCEDURE — 27000207 HC ISOLATION

## 2018-02-28 PROCEDURE — 36430 TRANSFUSION BLD/BLD COMPNT: CPT

## 2018-02-28 PROCEDURE — 90935 HEMODIALYSIS ONE EVALUATION: CPT | Mod: ,,, | Performed by: INTERNAL MEDICINE

## 2018-02-28 PROCEDURE — B4185 PARENTERAL SOL 10 GM LIPIDS: HCPCS | Performed by: PHYSICIAN ASSISTANT

## 2018-02-28 RX ORDER — HYDROCODONE BITARTRATE AND ACETAMINOPHEN 500; 5 MG/1; MG/1
TABLET ORAL
Status: DISCONTINUED | OUTPATIENT
Start: 2018-02-28 | End: 2018-03-05

## 2018-02-28 RX ADMIN — OXYCODONE HYDROCHLORIDE 10 MG: 5 TABLET ORAL at 05:02

## 2018-02-28 RX ADMIN — LEVETIRACETAM 500 MG: 100 SOLUTION ORAL at 09:02

## 2018-02-28 RX ADMIN — SODIUM CHLORIDE 0.5 G: 0.9 INJECTION, SOLUTION INTRAVENOUS at 09:02

## 2018-02-28 RX ADMIN — TACROLIMUS 1 MG: 1 CAPSULE ORAL at 05:02

## 2018-02-28 RX ADMIN — ONDANSETRON HYDROCHLORIDE 4 MG: 2 INJECTION, SOLUTION INTRAMUSCULAR; INTRAVENOUS at 05:02

## 2018-02-28 RX ADMIN — LEVOTHYROXINE SODIUM 75 MCG: 75 TABLET ORAL at 05:02

## 2018-02-28 RX ADMIN — SODIUM CHLORIDE: 0.9 INJECTION, SOLUTION INTRAVENOUS at 08:02

## 2018-02-28 RX ADMIN — LEVETIRACETAM 500 MG: 100 SOLUTION ORAL at 12:02

## 2018-02-28 RX ADMIN — HEPARIN SODIUM 1000 UNITS: 1000 INJECTION, SOLUTION INTRAVENOUS; SUBCUTANEOUS at 10:02

## 2018-02-28 RX ADMIN — SODIUM CHLORIDE: 9 INJECTION, SOLUTION INTRAVENOUS at 01:02

## 2018-02-28 RX ADMIN — METOCLOPRAMIDE HYDROCHLORIDE 10 MG: 5 SOLUTION ORAL at 09:02

## 2018-02-28 RX ADMIN — SOYBEAN OIL 250 ML: 20 INJECTION, SOLUTION INTRAVENOUS at 09:02

## 2018-02-28 RX ADMIN — URSODIOL 300 MG: 300 CAPSULE ORAL at 09:02

## 2018-02-28 RX ADMIN — ERYTHROPOIETIN 10000 UNITS: 10000 INJECTION, SOLUTION INTRAVENOUS; SUBCUTANEOUS at 10:02

## 2018-02-28 RX ADMIN — URSODIOL 300 MG: 300 CAPSULE ORAL at 05:02

## 2018-02-28 RX ADMIN — DRONABINOL 2.5 MG: 2.5 CAPSULE ORAL at 05:02

## 2018-02-28 RX ADMIN — BISACODYL 10 MG: 5 TABLET, COATED ORAL at 12:02

## 2018-02-28 RX ADMIN — ONDANSETRON HYDROCHLORIDE 4 MG: 2 INJECTION, SOLUTION INTRAMUSCULAR; INTRAVENOUS at 04:02

## 2018-02-28 RX ADMIN — OXYCODONE HYDROCHLORIDE 10 MG: 5 TABLET ORAL at 11:02

## 2018-02-28 RX ADMIN — TBO-FILGRASTIM 480 MCG: 300 INJECTION, SOLUTION SUBCUTANEOUS at 01:02

## 2018-02-28 RX ADMIN — METOCLOPRAMIDE HYDROCHLORIDE 10 MG: 5 SOLUTION ORAL at 01:02

## 2018-02-28 RX ADMIN — OMEPRAZOLE 40 MG: 40 CAPSULE, DELAYED RELEASE ORAL at 07:02

## 2018-02-28 RX ADMIN — SODIUM CHLORIDE: 234 INJECTION INTRAMUSCULAR; INTRAVENOUS; SUBCUTANEOUS at 09:02

## 2018-02-28 RX ADMIN — METOCLOPRAMIDE HYDROCHLORIDE 10 MG: 5 SOLUTION ORAL at 05:02

## 2018-02-28 NOTE — SUBJECTIVE & OBJECTIVE
Scheduled Meds:   bisacodyl  10 mg Oral Daily    docusate sodium  100 mg Oral BID    dronabinol  2.5 mg Oral Daily    epoetin maurisio (PROCRIT) injection  10,000 Units Intravenous Every Mon, Wed, Fri    ertapenem (INVANZ) IVPB  500 mg Intravenous Q24H    fat emulsion 20%  250 mL Intravenous Daily    heparin (porcine)  5,000 Units Subcutaneous Q8H    custom IVPB builder   Intravenous Q24H    levetiracetam oral soln  500 mg Oral BID    levothyroxine  75 mcg Oral Before breakfast    metoclopramide HCl  10 mg Oral QID (AC & HS)    multivitamin  1 tablet Oral Daily    omeprazole  40 mg Oral QAM    polyethylene glycol  17 g Oral BID    psyllium husk (aspartame)  3.4 g Oral BID    tacrolimus  1 mg Oral BID    tbo-filgrastim  480 mcg Subcutaneous Once    ursodiol  300 mg Oral BID     Continuous Infusions:   TPN ADULT CENTRAL LINE CUSTOM 48 mL/hr at 02/27/18 2158    TPN ADULT CENTRAL LINE CUSTOM       PRN Meds:sodium chloride, sodium chloride 0.9%, acetaminophen, albuterol-ipratropium 2.5mg-0.5mg/3mL, bisacodyl, dextrose 50%, dextrose 50%, fentaNYL, glucagon (human recombinant), glucose, glucose, heparin (porcine), naloxone, ondansetron, ondansetron, oxyCODONE, prochlorperazine, simethicone, sodium chloride 0.9%    Review of Systems   Constitutional: Positive for activity change, appetite change, fatigue and fever. Negative for chills.   HENT: Negative.  Negative for congestion and facial swelling.    Eyes: Negative for pain, discharge and visual disturbance.   Respiratory: Negative for cough, chest tightness, shortness of breath and wheezing.    Cardiovascular: Negative for chest pain, palpitations and leg swelling.   Gastrointestinal: Positive for abdominal distention, abdominal pain and nausea. Negative for constipation.   Endocrine: Negative.    Genitourinary: Positive for decreased urine volume. Negative for difficulty urinating.        Anuric   Musculoskeletal: Positive for back pain (improved).  Negative for arthralgias and myalgias.   Skin: Positive for color change and wound. Negative for rash.   Allergic/Immunologic: Positive for immunocompromised state.   Neurological: Positive for weakness. Negative for dizziness, seizures and headaches.   Psychiatric/Behavioral: Positive for decreased concentration and dysphoric mood. Negative for confusion. The patient is not nervous/anxious.    All other systems reviewed and are negative.    Objective:     Vital Signs (Most Recent):  Temp: 99.2 °F (37.3 °C) (02/28/18 1130)  Pulse: 79 (02/28/18 1130)  Resp: 17 (02/28/18 1130)  BP: 126/77 (02/28/18 1130)  SpO2: 98 % (02/28/18 0820) Vital Signs (24h Range):  Temp:  [98.7 °F (37.1 °C)-100.4 °F (38 °C)] 99.2 °F (37.3 °C)  Pulse:  [79-96] 79  Resp:  [12-20] 17  SpO2:  [93 %-98 %] 98 %  BP: (115-144)/(63-89) 126/77     Weight: 67 kg (147 lb 11.3 oz)  Body mass index is 23.84 kg/m².    Intake/Output - Last 3 Shifts       02/26 0700 - 02/27 0659 02/27 0700 - 02/28 0659 02/28 0700 - 03/01 0659    P.O. 360 340     I.V. (mL/kg) 0 (0) 0 (0)     Blood   212    Other 600 0 810    IV Piggyback 350 850     .2 703.1     Total Intake(mL/kg) 2148.2 (31.7) 1893.1 (28.3) 1022 (15.3)    Urine (mL/kg/hr) 100 (0.1) 50 (0)     Emesis/NG output 0 (0) 0 (0)     Other 2600 (1.6) 0 (0) 2810 (6.6)    Stool 0 (0) 0 (0)     Blood 0 (0) 0 (0)     Total Output 2700 50 2810    Net -551.8 +1843.1 -1788           Urine Occurrence 0 x 0 x     Stool Occurrence 0 x 1 x     Emesis Occurrence 0 x 0 x           Physical Exam   Constitutional: He is oriented to person, place, and time. He appears well-developed. No distress.   Temporal and distal extremity muscle wasting   HENT:   Head: Normocephalic and atraumatic.   Mouth/Throat: No oropharyngeal exudate.   dylan tube   Eyes: EOM are normal. Pupils are equal, round, and reactive to light. Scleral icterus is present.   Neck: Normal range of motion. Neck supple. No JVD present. No thyromegaly present.    Cardiovascular: Normal rate, regular rhythm, normal heart sounds and intact distal pulses.    No murmur heard.  Pulmonary/Chest: Effort normal. No respiratory distress. He has decreased breath sounds in the right middle field, the right lower field and the left lower field. He has no wheezes. He exhibits no tenderness.   Diminished to RLL   Abdominal: Soft. Bowel sounds are normal. He exhibits ascites. He exhibits no distension. There is tenderness. There is no rebound and no guarding.   Dressing to chevron.  Wd vac removed 1/12/18  Dependent edema present R flank; surrounding erythema   Musculoskeletal: Normal range of motion. He exhibits edema (2+ LE edema). He exhibits no tenderness.   Neurological: He is alert and oriented to person, place, and time. He has normal reflexes.   Skin: Skin is warm and dry. Capillary refill takes 2 to 3 seconds. He is not diaphoretic. No erythema.   jaundice   Psychiatric: He has a normal mood and affect. His behavior is normal. Judgment and thought content normal. His mood appears not anxious.   Depressed mood   Nursing note and vitals reviewed.      Laboratory:  Immunosuppressants         Stop Route Frequency     tacrolimus capsule 1 mg      -- Oral 2 times daily        CBC:     Recent Labs  Lab 02/28/18  0500   WBC 2.33*   RBC 2.17*   HGB 6.9*   HCT 21.1*      MCV 97   MCH 31.8*   MCHC 32.7     CMP:     Recent Labs  Lab 02/28/18  0500   GLU 91   CALCIUM 8.9   ALBUMIN 1.8*   PROT 5.0*      K 3.8   CO2 26      BUN 28*   CREATININE 4.4*   ALKPHOS 92   ALT <5*   AST 23   BILITOT 1.4*     Labs within the past 24 hours have been reviewed.    Diagnostic Results:  pertinent imaging reviewed

## 2018-02-28 NOTE — PLAN OF CARE
Problem: Patient Care Overview  Goal: Plan of Care Review  Outcome: Ongoing (interventions implemented as appropriate)  Pt aao x 4. Pt currently in bed with bed in lowest/locked position. Call light in reach. Free from falls/injury this shift and wearing non-skid footwear. Mother at bedside. Diet modified to regular, pt to be NPO at midnight for EGD tomorrow. Pt aware. TPN and Lipids ordered again for tonight. HD today, ~ 3L removed, 1 unit RBCs transfused in HD. Tmax 100.3 today. PRN oxycodone 10 mg given for c/o pain and IV zofran given for c/o nausea. EBV PCR collected and sent, results pending.    Will continue to monitor, assess, and adjust care as needed.

## 2018-02-28 NOTE — PLAN OF CARE
Problem: Patient Care Overview  Goal: Plan of Care Review  Outcome: Ongoing (interventions implemented as appropriate)  HD 3.5 hours, 2 Liters UF removed and tolerated well. 1 unit PRBC's and Procrit given with treatment. Tx ended with NS rinseback, Right permcath flushed and locked with Heparin 1000 units/ml to fill each lumen, capped using aseptic technique.

## 2018-02-28 NOTE — NURSING
Maintenance HD treatment initiated via Right permcath which aspirated and flushed easily, lines secured.

## 2018-02-28 NOTE — PROGRESS NOTES
[facility]  Adult Nutrition  Progress Note    SUMMARY     Recommendations    Recommendation/Intervention:   -Continue to encourage oral intake while TPN running. Hopeful marinol will work at will help patient resume good nutrition status.   -While PO intake remains poor, continue TPN. Current TPN provision providing 79% EEN and 100% EPN.    -RD following.     Goals: Consume/tolerate > 85% EEN and EPN  Nutrition Goal Status: progressing towards goal  Communication of RD Recs: reviewed with physician    Reason for Assessment    Reason for Assessment: RD follow-up  Diagnosis: transplant/postoperative complications  Relevent Medical History: OLTx 10/19/2017, complicate post-op w/ LAURA ongoing, chronic malnutrition, cirrhosis 2' EtOH   Interdisciplinary Rounds: attended     General Information Comments: Pt now on TPN providing all his needs, very rare PO intake, remains on marinol; per mom pt woke up at 9pm yesterday craving chicken sandwich - mom went to [place], pt drink 1/2 small frostee and did not try sandwich, TPN infusing at visit    Nutrition Discharge Planning: Adequate nutrition PO vs EN    Nutrition Prescription Ordered    Current Diet Order: NPO  Nutrition Order Comments: -  Current Nutrition Support Formula Ordered:  (Custom TPN 92g AA / 182g dex plus IV lipids)  Current Nutrition Support Rate Ordered: 48 (ml)  Current Nutrition Support Frequency Ordered: mL/hr  Oral Nutrition Supplement: Boost Breeze     Evaluation of Received Nutrients/Fluid Intake    Enteral Calories (kcal): 0  Enteral Protein (gm): 0  Enteral (Free Water) Fluid (mL): 0     Parenteral Calories (kcal): 987  Parenteral Protein (gm): 92  Parenteral Fluid (mL): 1152      Total Calories (kcal): 0     Energy Calories Required: not meeting needs  % Kcal Needs: 79      Protein Required: meeting needs  % Protein Needs: 100      GIR (Glucose Infusion Rate) (mg/kg/min): 1.89 mg/kg/min  Lipid Calories (kcals): 500 kcals  I/O: +4.7L  "since admit       Fluid Required: meeting needs  Comments: LBM   Tolerance: tolerating  % Intake of Estimated Energy Needs: 75 - 100 %  % Meal Intake: NPO     Nutrition Risk Screen     Nutrition Risk Screen: large or nonhealing wound, burn or pressure ulcer    Nutrition/Diet History    Patient Reported Diet/Restrictions/Preferences: low salt  Typical Food/Fluid Intake: eats very little at this time  Food Preferences: No cultural or Shinto food preferences noted  Meal/Snack Patterns: pt likes to cook, when feeling well will cook and then only eat bites  Supplemental Drinks or Food Habits:  (protein powders sometimes)  Factors Affecting Nutritional Intake: altered gastrointestinal function, abdominal distention, decreased appetite, nausea/vomiting      Labs/Tests/Procedures/Meds     Pertinent Labs Reviewed: reviewed, pertinent  Pertinent Labs Comments: BUN 28, Cr 4.4, GFR 17, TBili 1.4, Alb 1.8  Pertinent Medications Reviewed: reviewed, pertinent  Pertinent Medications Comments: tacrolimus, metoclopramide, MVI, heparin, epoetin, psyllium, marinol, docusate    Physical Findings    Overall Physical Appearance: generalized wasting, loss of muscle mass, loss of subcutaneous fat, weak  Tubes: nasoduoduenal tube  Oral/Mouth Cavity: WDL  Skin: intact    Anthropometrics    Temp: 99.2 °F (37.3 °C)     Height: 5' 6" (167.6 cm)  Weight Method: Bed Scale  Weight: 67 kg (147 lb 11.3 oz)  Ideal Body Weight (IBW), Male: 142 lb     % Ideal Body Weight, Male (lb): 112.68 lb     BMI (Calculated): 25.9  BMI Grade: 25 - 29.9 - overweight  Weight Loss: unintentional  Usual Body Weight (UBW), k kg (2017 )     % Usual Body Weight: 86.71  % Weight Change From Usual Weight: -13.47 %     Estimated/Assessed Needs    Weight Used For Calorie Calculations: 62.3 kg (137 lb 5.6 oz)   Height (cm): 167.6 cm  Energy Calorie Requirements (kcal): 9743-7452 (30-35 kcal/kg)  Energy Need Method: Kcal/kg      RMR ([facility] " Equation): 1535.75      Weight Used For Protein Calculations: 62.3 kg (137 lb 5.6 oz)  Protein Requirements: 81-94g (1.3-1.5 g/kg)  Fluid Requirements (mL): 1mL/kcal  Fluid Need Method: RDA Method      RDA Method (mL): 1869     Assessment and Plan    Severe protein-calorie malnutrition    Nutrition Diagnosis  Inadequate oral intake    Related to (etiology):   Decreased appetite, abdominal distention    Signs and Symptoms (as evidenced by):   Pt eating 0% of meals and relying on enteral nutrition for 100% of EPN, EEN     Interventions/Recommendations (treatment strategy):  See recs    Nutrition Diagnosis Status:   Improving            Monitor and Evaluation    Food and Nutrient Intake: energy intake, food and beverage intake  Food and Nutrient Adminstration: diet order  Knowledge/Beliefs/Attitudes: food and nutrition knowledge/skill  Physical Activity and Function: nutrition-related ADLs and IADLs  Anthropometric Measurements: weight, weight change, body mass index  Biochemical Data, Medical Tests and Procedures: electrolyte and renal panel, lipid profile, gastrointestinal profile, glucose/endocrine profile, inflammatory profile  Nutrition-Focused Physical Findings: overall appearance    Nutrition Risk    Level of Risk:  (2x/week)    Nutrition Follow-Up    RD Follow-up?: Yes

## 2018-02-28 NOTE — ASSESSMENT & PLAN NOTE
- detected at 370 on CMV PCR 2/1.  - case discussed with ID and will hold off on treatment at this time given low WBC.   - undetected CMV PCR 2/8.  - continue Valcyte 200 mg every Mon, Wed, Fri.  - CMV PCR 2/15 negative  - Per ID, valcyte dose decreased to ppx dose on 2/18.  - CMV PCR 2/22 negative  - Repeat scope tomorrow as pt with leukopenia, h/o CMV, and burning pain despite negative PCR

## 2018-02-28 NOTE — ASSESSMENT & PLAN NOTE
- Started on vanc/cefepime for ERCP. Now transitioned to vanc/zosyn.  - Blood cultures sent, prelim NGTD. Repeat blood cultures and chest xray today 2/10.  - thoracentesis 2/1 - fluid negative for infection.  - atelectasis noted on ct scan - start breathing tx & CPT.  - now cmv positive - started treatment 2/5/18.  - taken to OR 2/7 for exp lap - fluid collections drained, cultures pending.  - Patient with low grade fever 2/15, 100.3.  -CT A/P 2/15 with no evidence of IA fluid infection. Did show R pleural effusion s/p thoracentesis, cell count negative for infection, cx pending.  -Repeat blood cx 2/15 prelim NGTD, CMV PCR 2/15 negative   Complaining of abdominal pain. Is tender in RUQ.  -Remains with fevers  - Bilirubin remains elevated. Concerning for cholangitis. Antibiotics broadened 2/17. ID re consulted.   - ERCP  2/19, overall unremarkable findings. Did place new larger stents. Will need repeat ERCP in 10 weeks. Bilirubin is stable post ERCP.  -Tmax 101 2/21 PM again ID following. Plan to transition vanc/zosyn to ertapenem 2/22 as no improvement noted.   - Erta started 2/22.  -CT A/P obtained 2/26 with focal/loculated region of fluid in the lower right abdomen measuring approximately 8.7 x 5.5 cm.   -IR attempted drainage on fluid collection on 2/27, however, per IR,there was limited fluid interposed with bowel in the RLQ with no safe pocket seen for drainage.  - Tagged WBC scan 2/27, negative results.  -Remains with low grade fevers today. Will send EBV PCR. Monitor.        Principal Discharge DX:	Primary osteoarthritis of right hip  Goal:	Improve ambulation, ADLs and quality of life  Instructions for follow-up, activity and diet:	Physical Therapy/Occupational Therapy for: Ambulation, transfers, stairs, & ADLs, isometrics.  Full weight bearing on both legs; Walker/cane use as instructed by Physical therapy/Occupational therapy. Anterior Total Hip Replacement precautions for  6 weeks: No straight leg raise; No external rotation of hip when extended-standing or lying flat; No hyperextension of hip when standing (kickback).   Ice packs to hip for 30 min. every 3 hours and after physical therapy.   Keep incision clean and dry. May shower 5 days after surgery if no drainage from incision.  Prineo tape/suture removal on/near after Post Op Day # 14 in rehab facility / Surgeon's office.  Instructions for follow-up, activity and diet:	- Call your doctor if you experience:  • An increase in pain not controlled by pain medication or change in activity or  position.  • Temperature greater than 101° F.  • Redness, increased swelling or foul smelling drainage from or around the  incision.  • Numbness, tingling or a change in color or temperature of the operative leg.  • Call your doctor immediately if you experience chest pain, shortness of breath or calf pain. Principal Discharge DX:	Primary osteoarthritis of right hip  Goal:	improve function and quality of life with reduced hip pain  Instructions for follow-up, activity and diet:	Your new total hip replacement requires proper care.  Your surgical care provider is your best resource for information.  Your Physical Therapy /Occupational Therapy will include Ambulation, Transfers , Stairs, ADLs (activities of daily living), range of motion, and isometrics.  Your participation is vital for the fullest recovery and best results.  You may bear full weight as tolerated with rolling walker, cane or assistive device.    TOTAL HIP PRECAUTIONS  No straight leg raise  No external rotation of hip when extended-standing or lying flat  No hyperextension of hip when standing (kickback).  Do not take a tub bath yet.   Do not resume driving until you have your surgeon’s permission.     Keep incision clean and dry.  Change the dressing daily if there is drainage noted.  When there is no drainage the wound may be open to air.   The wound is closed with either sutures.  They are removed 2 weeks after surgery in surgeon's office.  If there is no wet drainage you may shower and pat dry with a clean towel.  Instructions for follow-up, activity and diet:	- Call your doctor if you experience:  • An increase in pain not controlled by pain medication or change in activity or  position.  • Temperature greater than 101° F.  • Redness, increased swelling or foul smelling drainage from or around the  incision.  • Numbness, tingling or a change in color or temperature of the operative leg.  • Call your doctor immediately if you experience chest pain, shortness of breath or calf pain.

## 2018-02-28 NOTE — ASSESSMENT & PLAN NOTE
- h/o constipation, reports having regular BM's with bowel regimen.  - vomiting episode x 1 at home. Anti-emetics ordered PRN.  - KUB repeated 1/14/18 given increased abdominal pain- mild, generalized bowel distention suggesting ileus.  Diet changed to clears as tolerated for bowel rest .  - SHANE tube placed 1/16 for tube feeds. Not tolerating tube feeds, stopped 1/17/18.  - D/C shane 1/19, now in place as of 1/25.  - chronic constipation, Cont bowel regimen.   - See protein calorie malnutrition.  - TF have been on hold since 2/20 for N/V.   - D/C shane 2/23 as not tolerating TF  - 2/24 changes: Increased Reglan to 10 mg, D/C atovaquone, transitioned cyclo to Prograf, change twyla to BID, check Keppra level  - GI consulted. Plan for EGD tomorrow as with continued nausea/vomiting with po intake.

## 2018-02-28 NOTE — ASSESSMENT & PLAN NOTE
"- patient with poor PO intake since transplantation   - had short course of TPN during previous hospitalization   - SHANE with tube feeding started 01/16 and stopped 01/17 due to intolerance; SHANE was removed 01/20 and placed back 01/25  - was on TPN 01/16  - small bowel follow-through 02/11: "Mild small bowel dilatation with normal small bowel transit time."  - EGD most recently performed 01/25: with no abnormal findings     GI re-consulted for further evaluation with repeat EGD with biopsies, given ongoing sx and fever.     We will proceed with EGD tomorrow. Please keep NPO PMN.   "

## 2018-02-28 NOTE — PROGRESS NOTES
[facility]  Liver Transplant  Progress Note    Patient Name: Jhonny Diana  MRN: [id number]  Admission Date: 2018  Hospital Length of Stay: 48 days  Code Status: Full Code  Primary Care Provider: Primary Doctor No  Post-Operative Day: 132    ORGAN:   LIVER  Disease Etiology: Acute Alcoholic Hepatitis  Donor Type:    - Brain Death  CDC High Risk:   No  Donor CMV Status:   Donor CMV Status: Positive  Donor HBcAB:   Negative  Donor HCV Status:   Negative  Whole or Partial: Whole Liver  Biliary Anastomosis: End to End  Arterial Anatomy: Standard  Subjective:     History of Present Illness:  Jhonny Diana is a 26 y/o male with past medical history of alcoholic cirrhosis.  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 Found to have peritonitis in November (WBC 5000, 75% PNM) neg for bile leak. He was treated initially with vanc/cefepime. Repeat cell counts  with some improvement (WBC 1400, 45% PNM). He has undergone multiple paracenteses as well as abscess drainage of perihepatic fluid collections and treated with antimicrobial therapy but no positive cultures. Of note, biliary stricture also identified and ERCP performed on 2017 with sphincterotomy and biliary stent placed. Liver tests still have not normalized despite intervention, bilirubin and AP remain elevated. Other pertinent PMH current wound vac in place 2/2 wound infection, malnutrition requiring TPN for short course and ongoing hypoalbuminemia, and seizure activity while on prograf and has since been switched to cyclosporine without reoccurrence.  He presented to the ER for fever, abdominal pain, and N/V. He reports fever (103) for 1  day prior. Overnight, he developed N/V, reports small amount of green emesis with new left sided pain. He also endorses worsening SOB with exertion. He was scheduled as an outpatient for follow up paracentesis and IR drainage of fluid collection. CXR in ER shows large pleural effusion with subsegmental atelectasis. Infectious work up initiated in ER. His ANC is 900. Broad spectrum antibiotics initiated in ED. He denies chest pain, palpitations, diarrhea, constipation, or back pain. Denies any sick contacts.    Hospital Course:  Thoracentesis (1.2L off), Paracentesis (1.6L off), and IR drainage of fluid collection 1/11, all fluids negative for infection. ID consulted. Broad spectrum antibiotics d/c'd 1/15. Chronically malnourished with poor PO intake, prealbumin 7. Shane tube placed 1/15 for tube feedings.     ERCP 1/17 with sludge and a biliary stone which was removed and stent exchanged.  Remained with n/v; therefore, TF remained on hold and TPN continued.   Pt with fever s/p ERCP on 1/17 which continued until 1/19.  Vanc/cefepime restarted.  Blood cx 1/17 and 1/18 NGTD.  ID reconsulted.  Fungal markers sent.  CT C/A/P obtained.  With large R pleural effusion and ascites- both drained 1/19 and negative for infx per cell count. Pt afebrile 1/20. Liver biopsy 1/23 - without rejection.  EGD 1/25 - unremarkable for source of GI symptoms.  Resume diet along with other GI recommendations. SHANE tube placed for tube feeds.       2/22: Pt again spiked temp of 101 overnight, now down to 99. Resent Bcx overnight, prelim NGTD. Transitioned to Erta from vanc/zosyn per ID. Planning for tagged WBC scan on Monday. R sided abdominal erythema/dependent edema improving today.  TF have been on hold since 2/20. Will continue to hold today per pt request. Pt working on diet. KUB yesterday with significant stool and gas. Declines colace, miralax, metamucil, but willing to take daily bisacodyl. Pt agreeable to suppository today. If  suppository not successful can give mag citrate through dylan tonight. Will also retrial Marinol tonight as pt reported improvement in appetite previously. Will monitor closely for sedation.     2/23:  2 small BM 2/22.  Tolerated HD.  Dylan removed.  Will attempt to eat this PM.      2/24:  Pt ate then vomited hamburger 2/23.  Will resume TPN.  D/C atovaquone, transition cyclo to Prograf, check Keppra level, increase Reglan to 0 mg, change twyla to BID for nausea.  Will give pentam today.    2/25:  TPN going well, pt ate 1/2 subway sandwhich without vomiting yesterday, will continue to monitor.  Plan to repeat EGD this week.  Pt with drop in H/h again and appears to be hemolyzing.  LDH elevated, hapto slightly low, LEE pending.  With neutropenia- Neupogen given.    2/26:  Pt with severe back pain s/p HD. Unclear if secondary to neupogen.  Repeat labs stable. Indium scan scheduled for 2/27- injection done today (back pain started prior). CT A/P obtained with fluid collection seen in pelvis.    2/27: Patient spiking fevers, 101. Indium scan, negative results. CT A/P obtained 2/26 with focal/loculated region of fluid in the lower right abdomen measuring approximately 8.7 x 5.5 cm. IR attempted drainage, however, per IR,there was  limited fluid interposed with bowel in the RLQ with no safe pocket seen for drainage.    2/28: Remains with low grade fevers today. Will send EBV PCR. Tolerated HD. H/H low on AM labs, 6/21 from 7/22. Denies hematochezia, melena. Transfused 1 unit of PRBC with HD. Patient feeling fatigued post HD today. Also with worsening weakness, requiring assistance to ambulate today. Neuro exam unremarkable. Will reconsult PT.  GI consulted. Plan for EGD tomorrow to assess for source of ongoing nausea/vomiting with food intake. Monitor.     Scheduled Meds:   bisacodyl  10 mg Oral Daily    docusate sodium  100 mg Oral BID    dronabinol  2.5 mg Oral Daily    epoetin maurisio (PROCRIT) injection  10,000 Units  Intravenous Every Mon, Wed, Fri    ertapenem (INVANZ) IVPB  500 mg Intravenous Q24H    fat emulsion 20%  250 mL Intravenous Daily    heparin (porcine)  5,000 Units Subcutaneous Q8H    custom IVPB builder   Intravenous Q24H    levetiracetam oral soln  500 mg Oral BID    levothyroxine  75 mcg Oral Before breakfast    metoclopramide HCl  10 mg Oral QID (AC & HS)    multivitamin  1 tablet Oral Daily    omeprazole  40 mg Oral QAM    polyethylene glycol  17 g Oral BID    psyllium husk (aspartame)  3.4 g Oral BID    tacrolimus  1 mg Oral BID    tbo-filgrastim  480 mcg Subcutaneous Once    ursodiol  300 mg Oral BID     Continuous Infusions:   TPN ADULT CENTRAL LINE CUSTOM 48 mL/hr at 02/27/18 2158    TPN ADULT CENTRAL LINE CUSTOM       PRN Meds:sodium chloride, sodium chloride 0.9%, acetaminophen, albuterol-ipratropium 2.5mg-0.5mg/3mL, bisacodyl, dextrose 50%, dextrose 50%, fentaNYL, glucagon (human recombinant), glucose, glucose, heparin (porcine), naloxone, ondansetron, ondansetron, oxyCODONE, prochlorperazine, simethicone, sodium chloride 0.9%    Review of Systems   Constitutional: Positive for activity change, appetite change, fatigue and fever. Negative for chills.   HENT: Negative.  Negative for congestion and facial swelling.    Eyes: Negative for pain, discharge and visual disturbance.   Respiratory: Negative for cough, chest tightness, shortness of breath and wheezing.    Cardiovascular: Negative for chest pain, palpitations and leg swelling.   Gastrointestinal: Positive for abdominal distention, abdominal pain and nausea. Negative for constipation.   Endocrine: Negative.    Genitourinary: Positive for decreased urine volume. Negative for difficulty urinating.        Anuric   Musculoskeletal: Positive for back pain (improved). Negative for arthralgias and myalgias.   Skin: Positive for color change and wound. Negative for rash.   Allergic/Immunologic: Positive for immunocompromised state.    Neurological: Positive for weakness. Negative for dizziness, seizures and headaches.   Psychiatric/Behavioral: Positive for decreased concentration and dysphoric mood. Negative for confusion. The patient is not nervous/anxious.    All other systems reviewed and are negative.    Objective:     Vital Signs (Most Recent):  Temp: 99.2 °F (37.3 °C) (02/28/18 1130)  Pulse: 79 (02/28/18 1130)  Resp: 17 (02/28/18 1130)  BP: 126/77 (02/28/18 1130)  SpO2: 98 % (02/28/18 0820) Vital Signs (24h Range):  Temp:  [98.7 °F (37.1 °C)-100.4 °F (38 °C)] 99.2 °F (37.3 °C)  Pulse:  [79-96] 79  Resp:  [12-20] 17  SpO2:  [93 %-98 %] 98 %  BP: (115-144)/(63-89) 126/77     Weight: 67 kg (147 lb 11.3 oz)  Body mass index is 23.84 kg/m².    Intake/Output - Last 3 Shifts       02/26 0700 - 02/27 0659 02/27 0700 - 02/28 0659 02/28 0700 - 03/01 0659    P.O. 360 340     I.V. (mL/kg) 0 (0) 0 (0)     Blood   212    Other 600 0 810    IV Piggyback 350 850     .2 703.1     Total Intake(mL/kg) 2148.2 (31.7) 1893.1 (28.3) 1022 (15.3)    Urine (mL/kg/hr) 100 (0.1) 50 (0)     Emesis/NG output 0 (0) 0 (0)     Other 2600 (1.6) 0 (0) 2810 (6.6)    Stool 0 (0) 0 (0)     Blood 0 (0) 0 (0)     Total Output 2700 50 2810    Net -551.8 +1843.1 -1788           Urine Occurrence 0 x 0 x     Stool Occurrence 0 x 1 x     Emesis Occurrence 0 x 0 x           Physical Exam   Constitutional: He is oriented to person, place, and time. He appears well-developed. No distress.   Temporal and distal extremity muscle wasting   HENT:   Head: Normocephalic and atraumatic.   Mouth/Throat: No oropharyngeal exudate.   dylan tube   Eyes: EOM are normal. Pupils are equal, round, and reactive to light. Scleral icterus is present.   Neck: Normal range of motion. Neck supple. No JVD present. No thyromegaly present.   Cardiovascular: Normal rate, regular rhythm, normal heart sounds and intact distal pulses.    No murmur heard.  Pulmonary/Chest: Effort normal. No respiratory  distress. He has decreased breath sounds in the right middle field, the right lower field and the left lower field. He has no wheezes. He exhibits no tenderness.   Diminished to RLL   Abdominal: Soft. Bowel sounds are normal. He exhibits ascites. He exhibits no distension. There is tenderness. There is no rebound and no guarding.   Dressing to chevron.  Wd vac removed 1/12/18  Dependent edema present R flank; surrounding erythema   Musculoskeletal: Normal range of motion. He exhibits edema (2+ LE edema). He exhibits no tenderness.   Neurological: He is alert and oriented to person, place, and time. He has normal reflexes.   Skin: Skin is warm and dry. Capillary refill takes 2 to 3 seconds. He is not diaphoretic. No erythema.   jaundice   Psychiatric: He has a normal mood and affect. His behavior is normal. Judgment and thought content normal. His mood appears not anxious.   Depressed mood   Nursing note and vitals reviewed.      Laboratory:  Immunosuppressants         Stop Route Frequency     tacrolimus capsule 1 mg      -- Oral 2 times daily        CBC:     Recent Labs  Lab 02/28/18  0500   WBC 2.33*   RBC 2.17*   HGB 6.9*   HCT 21.1*      MCV 97   MCH 31.8*   MCHC 32.7     CMP:     Recent Labs  Lab 02/28/18  0500   GLU 91   CALCIUM 8.9   ALBUMIN 1.8*   PROT 5.0*      K 3.8   CO2 26      BUN 28*   CREATININE 4.4*   ALKPHOS 92   ALT <5*   AST 23   BILITOT 1.4*     Labs within the past 24 hours have been reviewed.    Diagnostic Results:  pertinent imaging reviewed    Assessment/Plan:     Other neutropenia    - Neupogen given 2/25  - . Plan for neupogen again today 2/28.         Other cytomegaloviral diseases    - detected at 370 on CMV PCR 2/1.  - case discussed with ID and will hold off on treatment at this time given low WBC.   - undetected CMV PCR 2/8.  - continue Valcyte 200 mg every Mon, Wed, Fri.  - CMV PCR 2/15 negative  - Per ID, valcyte dose decreased to ppx dose on 2/18.  - CMV PCR  2/22 negative  - Repeat scope tomorrow as pt with leukopenia, h/o CMV, and burning pain despite negative PCR        Fever    - Started on vanc/cefepime for ERCP. Now transitioned to vanc/zosyn.  - Blood cultures sent, prelim NGTD. Repeat blood cultures and chest xray today 2/10.  - thoracentesis 2/1 - fluid negative for infection.  - atelectasis noted on ct scan - start breathing tx & CPT.  - now cmv positive - started treatment 2/5/18.  - taken to OR 2/7 for exp lap - fluid collections drained, cultures pending.  - Patient with low grade fever 2/15, 100.3.  -CT A/P 2/15 with no evidence of IA fluid infection. Did show R pleural effusion s/p thoracentesis, cell count negative for infection, cx pending.  -Repeat blood cx 2/15 prelim NGTD, CMV PCR 2/15 negative   Complaining of abdominal pain. Is tender in RUQ.  -Remains with fevers  - Bilirubin remains elevated. Concerning for cholangitis. Antibiotics broadened 2/17. ID re consulted.   - ERCP  2/19, overall unremarkable findings. Did place new larger stents. Will need repeat ERCP in 10 weeks. Bilirubin is stable post ERCP.  -Tmax 101 2/21 PM again ID following. Plan to transition vanc/zosyn to ertapenem 2/22 as no improvement noted.   - Erta started 2/22.  -CT A/P obtained 2/26 with focal/loculated region of fluid in the lower right abdomen measuring approximately 8.7 x 5.5 cm.   -IR attempted drainage on fluid collection on 2/27, however, per IR,there was limited fluid interposed with bowel in the RLQ with no safe pocket seen for drainage.  - Tagged WBC scan 2/27, negative results.  -Remains with low grade fevers today. Will send EBV PCR. Monitor.             Constipation    - Enema ordered 1/20 and 1/21 with BM.  - Encourage ambulation.  - decrease narcotics.  - d/c metamucil and miralax bid as makes patient nauseated.  - increase reglan qid.  - small bowel follow through with normal findings.   - patient reports last BM 2/19   -schedule bisacodyl  -KUB 2/21 with  significant amounts of stool and gas. Pt declines miralax, metamucil, colace.   -encouraged suppository.  - Improved on KUB 2/23        Other ascites    - Paracentesis performed 1/11/18 with 1600 ml removed.    - fluid negative for infection.   - Repeat para 1/19 negative for infection per cell count.  - no fluid seen for paracentesis on 1/23.        Severe protein-calorie malnutrition    - Poor PO intake since transplant requiring short course of TPN during previous hospital stay.   - albumin remains decreased but appetite slowly improving per pt.   - Dietary consulted. Will need to closely monitor PO intake.   - supplements also ordered.   - SHANE with tube feeding start 1/16. Stopped 1/17/18, was not tolerating. Ilfeld removed 1/20.  - prealbumin 7 on 1/15.  - TPN started 1/16/18.   - Pt able to eat minimally 1/21 and 1/22 which is an improvement.  - gi consulted - EGD reviewed, increased ppi bid, miralax bid, and metamucil bid, decreased narcotics.  - continue reglan, placed SHANE tube with tube feeds - transitioned to nightly feeds. Not tolerating well due to nausea.   - small bowel follow through with normal findings.   - consulted gi for possible gj tube placement, holding off on this for now as with ongoing fevers.  -Dietary changed tube feeds back to 24 hours from nightly 2/19 as not eating enough during the day to meet caloric needs. TF on hold since 2/20.  - retrial marinol 2/22 as pt reports that's the only thing helping his appetite, in the evening 2/2 concern for possible drowsiness  - pt feels marinol is helping  - Pt vomited 2/23 after trying hamburger  - Start TPN 2/24- continue  - Pt able to 1/4 sandwich without vomiting 2/25          Nausea and vomiting    - h/o constipation, reports having regular BM's with bowel regimen.  - vomiting episode x 1 at home. Anti-emetics ordered PRN.  - KUB repeated 1/14/18 given increased abdominal pain- mild, generalized bowel distention suggesting ileus.  Diet changed to  clears as tolerated for bowel rest .  - SHANE tube placed 1/16 for tube feeds. Not tolerating tube feeds, stopped 1/17/18.  - D/C shane 1/19, now in place as of 1/25.  - chronic constipation, Cont bowel regimen.   - See protein calorie malnutrition.  - TF have been on hold since 2/20 for N/V.   - D/C shane 2/23 as not tolerating TF  - 2/24 changes: Increased Reglan to 10 mg, D/C atovaquone, transitioned cyclo to Prograf, change twyla to BID, check Keppra level  - GI consulted. Plan for EGD tomorrow as with continued nausea/vomiting with po intake.             Recurrent pleural effusion on right    - CXR in ER with large right pleural effusion with subjective c/o worsening SOB  - thoracentesis completed- 1200 ml removed.  SOB with significant improvement.   - Cell count reviewed and negative for infection.   - resp even and non labored.  O2 sat 94-98%.  - Repeat thora 1/19 negative for infection per cell count.  - Reaccumulating fluid on xray 1/21.  - thoracentesis 1/31 - 1.4L removed, negative for infection  - Reaccumulating fluid again on xray 2/10.   -Thoracentesis performed  2/16 with 1500 cc off, cell count neg for infection, cx no growth to date.   -CT A/P 2/26 with large R pleural effusion, may need repeat thora this week.         Biliary stricture of transplanted liver    - tbili with increase prompting ERCP 1/30 with stones and sludge, stents placed.  -  Repeat ERCP 2/19 as was having recurrent fevers with bilirubin stuck at 2.0-2.2 with overall unremarkable findings seen. Did place new larger stents. Bilirubin now improving. Will need repeat ERCP in 10 weeks.         Anemia of chronic disease    - H/H stable. Cont to monitor with daily cbc.         Delayed surgical wound healing    - wd vac removed 1/12/18.  Wound healing well. Aquacel AG and Mepilex border dressing applied via wound care recs.           At risk for opportunistic infections    - CMV detected 2/1 now on tx dose valcyte. CMV PCR 2/15 not detected.  CMV PCR 2/22, negative   - Pentam given 2/24            Long-term use of immunosuppressant medication    - Maintenance IS with cyclosporine -->transitioned to Prograf 2/24 today for continued nausea.  - MMF on hold for infections and neutropenia.   - Continue to check cyclosporine and Prograf level daily during transition. Assess for toxicity and adjust level as needed.        Prophylactic immunotherapy    - See long term use of immunosuppression.         Liver transplanted    - Post op course complicated by fevers and hyperbilirubinemia.  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  - AST/ALT normal. Tbili/alkphos remain elevated.  - Liver US 1/8 showed 3.9 cm complex fluid collection anterior to right lobe and moderate nonspecific complex ascites inferior to transplant.   - IR placed drain 1/11/18, cell count negative for infection.  - PBS consulted. PBS not comfortable proceeding with EUS with liver biopsy given fever and possible infectious process going on. ERCP 1/17 with stone and sludge. Pt placed on Actigall - treated with 10 day course of abx.  ID now signed off.  - Bili elevated - liver u/s 1/28  - Liver biopsy 1/23 without rejection.  - consulted PBS for ERCP - Choledocholithiasis was found along with sludge, stents placed 1/30.  - ERCP 2/19, overall unremarkable findings. Did place new larger stents. Will need repeat ERCP in 10 weeks. Bilirubin improving        Acute renal failure with tubular necrosis    - HD resumed on previous admission. Now anuric and dialyzes M-W-F.  - Nephrology following.    - ktm consulted for possible kidney transplant.            VTE Risk Mitigation         Ordered     heparin (porcine) injection 1,000 Units  As needed (PRN)     Route:  Intra-Catheter        02/05/18 0945     heparin (porcine) injection 5,000 Units  Every 8 hours     Route:  Subcutaneous        01/31/18 0957     Medium Risk of VTE  Once      01/11/18 0351     Place sequential compression  device  Until discontinued      01/11/18 0351          The patients clinical status was discussed at multidisplinary rounds, involving transplant surgery, transplant medicine, pharmacy, nursing, nutrition, and social work    Discharge Planning: Not a candidate for discharge at this time.       ANA CRISTINA BusbyC  Liver Transplant  [facility]

## 2018-02-28 NOTE — PROGRESS NOTES
[facility]  Gastroenterology  Progress Note    Patient Name: Jhonny Diana  MRN: [id number]  Admission Date: 1/11/2018  Hospital Length of Stay: 48 days  Code Status: Full Code   Attending Provider: Nathanael Medina MD  Consulting Provider: Eleuterio Maciel MD  Primary Care Physician: Primary Doctor No  Principal Problem: Fever      Subjective:     Interval History:   Intermittent fever.   Continues have nausea and vomiting.   CMV PCR positive in the past.   Pending EBV PCR    Review of Systems   Constitutional: Positive for activity change, appetite change and fever.   Respiratory: Negative for apnea and chest tightness.    Gastrointestinal: Positive for vomiting. Negative for diarrhea.   Genitourinary: Negative for dysuria and enuresis.   Allergic/Immunologic: Positive for immunocompromised state.   Neurological: Negative for facial asymmetry.   Psychiatric/Behavioral: Negative for agitation.     Objective:     Vital Signs (Most Recent):  Temp: 99.2 °F (37.3 °C) (02/28/18 1130)  Pulse: 79 (02/28/18 1130)  Resp: 17 (02/28/18 1130)  BP: 126/77 (02/28/18 1130)  SpO2: 98 % (02/28/18 0820) Vital Signs (24h Range):  Temp:  [98.7 °F (37.1 °C)-100.4 °F (38 °C)] 99.2 °F (37.3 °C)  Pulse:  [79-96] 79  Resp:  [16-20] 17  SpO2:  [93 %-98 %] 98 %  BP: (115-144)/(63-83) 126/77     Weight: 67 kg (147 lb 11.3 oz) (02/28/18 0400)  Body mass index is 23.84 kg/m².      Intake/Output Summary (Last 24 hours) at 02/28/18 1533  Last data filed at 02/28/18 1130   Gross per 24 hour   Intake          1967.09 ml   Output             2810 ml   Net          -842.91 ml       Lines/Drains/Airways     Central Venous Catheter Line                 Hemodialysis Catheter right internal jugular -- days         Percutaneous Central Line Insertion/Assessment - triple lumen  02/24/18 1600 left internal jugular 3 days          Peripheral Intravenous Line                 Midline Catheter Insertion/Assessment  - Single Lumen 02/06/18 0917  "Left brachial vein 18g x 8cm 22 days                Physical Exam   Constitutional: He is oriented to person, place, and time.   Cardiovascular: Normal rate and regular rhythm.    Pulmonary/Chest: Effort normal and breath sounds normal.   Abdominal: Soft. Bowel sounds are normal. He exhibits no distension and no mass. There is no tenderness. There is no rebound and no guarding. No hernia.   Abdominal scars   Neurological: He is alert and oriented to person, place, and time.   Psychiatric: He has a normal mood and affect.     Lab Results   Component Value Date    WBC 2.33 (L) 02/28/2018    HGB 6.9 (L) 02/28/2018    HCT 21.1 (L) 02/28/2018    MCV 97 02/28/2018     02/28/2018     Lab Results   Component Value Date    INR 1.0 02/16/2018     Lab Results   Component Value Date     02/28/2018    K 3.8 02/28/2018    CREATININE 4.4 (H) 02/28/2018     Lab Results   Component Value Date    ALBUMIN 1.8 (L) 02/28/2018    ALT <5 (L) 02/28/2018    AST 23 02/28/2018     (H) 10/26/2017    ALKPHOS 92 02/28/2018    BILITOT 1.4 (H) 02/28/2018     No results found for: AFP  Lab Results   Component Value Date    LIPASE <3 (L) 02/26/2018    AMYLASE 10 (L) 02/26/2018     Lab Results   Component Value Date    TACROLIMUS 5.0 02/28/2018    TACROLIMUS 5.0 02/28/2018       Imaging:  Reviewed and as noted in HPI.         Assessment/Plan:     Nausea and vomiting    - patient with poor PO intake since transplantation   - had short course of TPN during previous hospitalization   - SHANE with tube feeding started 01/16 and stopped 01/17 due to intolerance; SHANE was removed 01/20 and placed back 01/25  - was on TPN 01/16  - small bowel follow-through 02/11: "Mild small bowel dilatation with normal small bowel transit time."  - EGD most recently performed 01/25: with no abnormal findings     GI re-consulted for further evaluation with repeat EGD with biopsies, given ongoing sx and fever.     We will proceed with EGD tomorrow. Please " keep NPO PMN.             Thank you for your consult. I will follow-up with patient. Please contact us if you have any additional questions.    Eleuterio Maciel MD  Gastroenterology  [facility]

## 2018-02-28 NOTE — SUBJECTIVE & OBJECTIVE
Subjective:     Interval History:   Intermittent fever.   Continues have nausea and vomiting.   CMV PCR positive in the past.   Pending EBV PCR    Review of Systems   Constitutional: Positive for activity change, appetite change and fever.   Respiratory: Negative for apnea and chest tightness.    Gastrointestinal: Positive for vomiting. Negative for diarrhea.   Genitourinary: Negative for dysuria and enuresis.   Allergic/Immunologic: Positive for immunocompromised state.   Neurological: Negative for facial asymmetry.   Psychiatric/Behavioral: Negative for agitation.     Objective:     Vital Signs (Most Recent):  Temp: 99.2 °F (37.3 °C) (02/28/18 1130)  Pulse: 79 (02/28/18 1130)  Resp: 17 (02/28/18 1130)  BP: 126/77 (02/28/18 1130)  SpO2: 98 % (02/28/18 0820) Vital Signs (24h Range):  Temp:  [98.7 °F (37.1 °C)-100.4 °F (38 °C)] 99.2 °F (37.3 °C)  Pulse:  [79-96] 79  Resp:  [16-20] 17  SpO2:  [93 %-98 %] 98 %  BP: (115-144)/(63-83) 126/77     Weight: 67 kg (147 lb 11.3 oz) (02/28/18 0400)  Body mass index is 23.84 kg/m².      Intake/Output Summary (Last 24 hours) at 02/28/18 1533  Last data filed at 02/28/18 1130   Gross per 24 hour   Intake          1967.09 ml   Output             2810 ml   Net          -842.91 ml       Lines/Drains/Airways     Central Venous Catheter Line                 Hemodialysis Catheter right internal jugular -- days         Percutaneous Central Line Insertion/Assessment - triple lumen  02/24/18 1600 left internal jugular 3 days          Peripheral Intravenous Line                 Midline Catheter Insertion/Assessment  - Single Lumen 02/06/18 0917 Left brachial vein 18g x 8cm 22 days                Physical Exam   Constitutional: He is oriented to person, place, and time.   Cardiovascular: Normal rate and regular rhythm.    Pulmonary/Chest: Effort normal and breath sounds normal.   Abdominal: Soft. Bowel sounds are normal. He exhibits no distension and no mass. There is no tenderness. There  is no rebound and no guarding. No hernia.   Abdominal scars   Neurological: He is alert and oriented to person, place, and time.   Psychiatric: He has a normal mood and affect.     Lab Results   Component Value Date    WBC 2.33 (L) 02/28/2018    HGB 6.9 (L) 02/28/2018    HCT 21.1 (L) 02/28/2018    MCV 97 02/28/2018     02/28/2018     Lab Results   Component Value Date    INR 1.0 02/16/2018     Lab Results   Component Value Date     02/28/2018    K 3.8 02/28/2018    CREATININE 4.4 (H) 02/28/2018     Lab Results   Component Value Date    ALBUMIN 1.8 (L) 02/28/2018    ALT <5 (L) 02/28/2018    AST 23 02/28/2018     (H) 10/26/2017    ALKPHOS 92 02/28/2018    BILITOT 1.4 (H) 02/28/2018     No results found for: AFP  Lab Results   Component Value Date    LIPASE <3 (L) 02/26/2018    AMYLASE 10 (L) 02/26/2018     Lab Results   Component Value Date    TACROLIMUS 5.0 02/28/2018    TACROLIMUS 5.0 02/28/2018       Imaging:  Reviewed and as noted in HPI.

## 2018-02-28 NOTE — PROGRESS NOTES
Pt transferred to HD via stretcher. TPN/Lipids and chart transferred with pt. In no apparent distress upon departure.

## 2018-02-28 NOTE — PLAN OF CARE
Problem: Patient Care Overview  Goal: Plan of Care Review  Outcome: Ongoing (interventions implemented as appropriate)  -Pt AAOx4, VSS, walked with mother overnight x1, Tmax- 99.2, ertapenem +isaconvusonium IV antibiotics given, latest WBC- 2.33, Indium scan yesterday inconclusive, IR unable to place drain due to ultrasound findings-refer to note + chart review  - TPN remains at 48 cc/hr, lipids overnight 20.8 cc/hr- complains of nausea x1 overnight- received PRN IV zofran and scheduled PO reglan, tolerated 50% of dinner, received PRN suppository and had 1 BM overnight  - 50 cc of UOP overnight, plans for HD in the AM- will give scheduled AM medication early  - Requests PO oxycodone x2 overnight for back pain, mother to remain at bedside, no acute events overnight  - Possible EGD later this week due to consistent nausea  - Bed in lowest position, non skid socks worn, call light within reach. Will continue to monitor.

## 2018-02-28 NOTE — ANESTHESIA PREPROCEDURE EVALUATION
[facility]  Anesthesia Pre-Operative Evaluation         Patient Name: Jhonny Diana  YOB: 1990  MRN: [id number]    SUBJECTIVE:     Pre-operative evaluation for Procedure(s) (LRB):  ESOPHAGOGASTRODUODENOSCOPY (EGD) (N/A)     02/28/2018    Jhonny Diana is a 27 y.o. male w/ a significant PMHx of asthma, alcoholic cirrhosis S/p DDLT 10/19/2017 c/b seizures, ATN requiring HD, superficial wound infection s/p wound vac to chevron incision, and multiple admissions for fevers and abdominal pain with RLQ loculation. Patient now presents for the above procedure(s).      LDA:        Hemodialysis Catheter right internal jugular (Active)   Site Assessment Clean;Dry;Intact 2/28/2018 11:30 AM   Status Deaccessed 2/28/2018 11:30 AM   Flows Good 2/28/2018 11:30 AM   Dressing Intervention Dressing changed 2/28/2018 11:30 AM   Dressing Status Biopatch in place 2/28/2018 11:30 AM   Dressing Change Due 02/28/18 2/27/2018  7:25 PM   Verification by X-ray Yes 2/28/2018 11:30 AM   Site Condition No complications 2/28/2018 11:30 AM   Dressing Occlusive 2/28/2018 11:30 AM   Daily Line Review Performed 2/28/2018 11:30 AM   Arterial Volume (mL) 1.8 mL 2/28/2018 11:30 AM   Venous Volume (mL) 1.8 mL 2/28/2018 11:30 AM   Number of days:             Percutaneous Central Line Insertion/Assessment - triple lumen  02/24/18 1600 left internal jugular (Active)   Dressing biopatch in place;dressing dry and intact 2/28/2018  7:24 AM   Securement secured w/ sutures 2/28/2018  7:24 AM   Additional Site Signs no erythema;no warmth;no edema;no pain;no palpable cord;no streak formation;no drainage 2/28/2018  7:24 AM   Distal Patency/Care infusing 2/28/2018  7:24 AM   Medial Patency/Care normal saline locked 2/28/2018  7:24 AM   Proximal Patency/Care normal saline locked 2/28/2018  7:24 AM   Dressing Change Due 03/03/18 2/28/2018  7:24 AM   Daily Line Review Performed 2/28/2018  7:24 AM   Number of days: 3            Midline  Catheter Insertion/Assessment  - Single Lumen 02/06/18 0917 Left brachial vein 18g x 8cm (Active)   Site Assessment Clean;Dry;No redness;Intact;No swelling 2/28/2018  7:24 AM   IV Device Securement catheter securement device 2/28/2018  7:24 AM   Line Status Saline locked 2/28/2018  7:24 AM   Dressing Status Biopatch in place;Clean;Dry;Intact 2/28/2018  7:24 AM   Dressing Intervention Dressing changed 2/20/2018  8:00 PM   Dressing Change Due 03/06/18 2/28/2018  7:24 AM   Site Change Due 03/07/18 2/28/2018  7:24 AM   Reason Not Rotated Not due 2/28/2018  7:24 AM   Number of days: 22       Prev airway:     Present Prior to Hospital Arrival?: No; Placement Date: 02/19/18; Placement Time: 1439; Method of Intubation: Direct laryngoscopy; Inserted by: CRNA; Airway Device: Endotracheal Tube; Mask Ventilation: Easy; Intubated: Postinduction; Blade: Moore #2; Airway Device Size: 7.0; Style: Cuffed; Cuff Inflation: Minimal occlusive pressure; Inflation Amount: 6; Placement Verified By: Auscultation; Grade: Grade I; Complicating Factors: None; Intubation Findings: Positive EtCO2, Bilateral breath sounds, Atraumatic/Condition of teeth unchanged;  Depth of Insertion: 22; Securment: Lips; Complications: None; Breath Sounds: Equal Bilateral; Insertion Attempts: 1; Removal Date: 02/19/18;  Removal Time: 1526    Drips:   TPN ADULT CENTRAL LINE CUSTOM 48 mL/hr at 02/27/18 2158    TPN ADULT CENTRAL LINE CUSTOM         Patient Active Problem List   Diagnosis    Alcoholism /alcohol abuse    Acute renal failure with tubular necrosis    Liver transplanted    Prophylactic immunotherapy    Long-term use of immunosuppressant medication    At risk for opportunistic infections    Seizure    Delayed surgical wound healing    Anemia of chronic disease    Biliary stricture of transplanted liver    Severe malnutrition    Hypothyroidism    Recurrent pleural effusion on right    N&V (nausea and vomiting)    Nausea and vomiting     Severe protein-calorie malnutrition    Other ascites    Constipation    Fever    Kidney transplant candidate    Other cytomegaloviral diseases    Elevated bilirubin    Other neutropenia       Review of patient's allergies indicates:   Allergen Reactions    Bactrim [sulfamethoxazole-trimethoprim] Other (See Comments)     Mookie Trell Syndrome       Current Inpatient Medications:   bisacodyl  10 mg Oral Daily    cyclobenzaprine  5 mg Oral Once    docusate sodium  100 mg Oral BID    dronabinol  2.5 mg Oral Daily    epoetin maurisio (PROCRIT) injection  10,000 Units Intravenous Every Mon, Wed, Fri    ertapenem (INVANZ) IVPB  500 mg Intravenous Q24H    fat emulsion 20%  250 mL Intravenous Daily    heparin (porcine)  5,000 Units Subcutaneous Q8H    custom IVPB builder   Intravenous Q24H    levetiracetam oral soln  500 mg Oral BID    levothyroxine  75 mcg Oral Before breakfast    metoclopramide HCl  10 mg Oral QID (AC & HS)    multivitamin  1 tablet Oral Daily    omeprazole  40 mg Oral QAM    polyethylene glycol  17 g Oral BID    psyllium husk (aspartame)  3.4 g Oral BID    tacrolimus  1 mg Oral BID    tbo-filgrastim  480 mcg Subcutaneous Once    ursodiol  300 mg Oral BID       No current facility-administered medications on file prior to encounter.      Current Outpatient Prescriptions on File Prior to Encounter   Medication Sig Dispense Refill    aspirin (ECOTRIN) 81 MG EC tablet Take 1 tablet (81 mg total) by mouth once daily. 30 tablet 5    atovaquone (MEPRON) 750 mg/5 mL Susp Take 10 mLs (1,500 mg total) by mouth once daily. Stop on 4/17/18 300 mL 5    cycloSPORINE modified, NEORAL, 25 MG capsule Take 3 capsules (75 mg total) by mouth 2 (two) times daily. 90 capsule 5    docusate sodium (COLACE) 100 MG capsule Take 100 mg by mouth 3 (three) times daily as needed for Constipation.      ergocalciferol (ERGOCALCIFEROL) 50,000 unit Cap Take 1 capsule (50,000 Units total) by mouth every 7  days. 4 capsule 5    levETIRAcetam (KEPPRA) 500 MG Tab Take 1 tablet (500 mg total) by mouth 2 (two) times daily. 60 tablet 11    levothyroxine (SYNTHROID) 75 MCG tablet Take 1 tablet (75 mcg total) by mouth before breakfast. 30 tablet 11    mirtazapine (REMERON) 7.5 MG Tab Take 1 tablet (7.5 mg total) by mouth every evening. 30 tablet 11    multivitamin (THERAGRAN) tablet Take 1 tablet by mouth once daily. 30 tablet 11    ondansetron (ZOFRAN-ODT) 8 MG TbDL Take 1 tablet (8 mg total) by mouth every 8 (eight) hours as needed (nausea). 30 tablet 1    oxyCODONE (ROXICODONE) 10 mg Tab immediate release tablet Take 0.5-1 tablets (5-10 mg total) by mouth every 6 (six) hours as needed for Pain. 30 tablet 0    pantoprazole (PROTONIX) 40 MG tablet Take 1 tablet (40 mg total) by mouth once daily. 30 tablet 5    sodium bicarbonate 650 MG tablet Take 2 tablets (1,300 mg total) by mouth 2 (two) times daily. 120 tablet 2    thiamine 100 MG tablet Take 1 tablet (100 mg total) by mouth once daily. 30 tablet 6    ursodiol (ACTIGALL) 300 mg capsule Take 1 capsule (300 mg total) by mouth 2 (two) times daily. 60 capsule 11       Past Surgical History:   Procedure Laterality Date    APPENDECTOMY      ERCP      ESOPHAGOGASTRODUODENOSCOPY      LIVER TRANSPLANT         Social History     Social History    Marital status: Single     Spouse name: N/A    Number of children: N/A    Years of education: N/A     Occupational History    Not on file.     Social History Main Topics    Smoking status: Former Smoker     Packs/day: 1.00     Years: 10.00     Types: Cigarettes     Start date: 2/19/2017    Smokeless tobacco: Never Used    Alcohol use No      Comment: a fifth of liquor daily for years, cut back over last 2 months    Drug use: No    Sexual activity: Not on file     Other Topics Concern    Not on file     Social History Narrative    No narrative on file       OBJECTIVE:     Vital Signs Range (Last 24H):  Temp:  [37.1  °C (98.7 °F)-38 °C (100.4 °F)]   Pulse:  [79-96]   Resp:  [10-20]   BP: (115-144)/(63-89)   SpO2:  [93 %-98 %]       CBC:   Recent Labs      18   0500  18   0500   WBC  2.33*  2.33*   RBC  2.26*  2.17*   HGB  7.3*  6.9*   HCT  22.1*  21.1*   PLT  159  154   MCV  98  97   MCH  32.3*  31.8*   MCHC  33.0  32.7       CMP:   Recent Labs      18   0500  18   0500   NA  139  136   K  3.6  3.8   CL  105  102   CO2  26  26   BUN  15  28*   CREATININE  3.5*  4.4*   GLU  92  91   MG  2.0  2.2   PHOS  1.2*  2.8   CALCIUM  8.1*  8.9   ALBUMIN  1.7*  1.8*   PROT  5.0*  5.0*   ALKPHOS  88  92   ALT  <5*  <5*   AST  22  23   BILITOT  1.5*  1.4*       INR:  No results for input(s): PT, INR, PROTIME, APTT in the last 72 hours.    Diagnostic Studies:     EK/11/18    Test Reason : R50.9  Blood Pressure : 097/056 mmHG  Vent. Rate : 091 BPM     Atrial Rate : 091 BPM     P-R Int : 144 ms          QRS Dur : 084 ms      QT Int : 378 ms       P-R-T Axes : 048 040 049 degrees     QTc Int : 464 ms    Normal sinus rhythm  Normal ECG  When compared with ECG of 03-DEC-2017 20:16,  Nonspecific T wave abnormality now evident in Lateral leads  Confirmed by Jason Roberts MD ([id number]) on 2018 11:22:33  AM    2D ECHO:  Results for orders placed or performed during the hospital encounter of 17   2D echo with color flow doppler   Result Value Ref Range    EF 65 55 - 65    Mitral Valve Regurgitation TRIVIAL     Diastolic Dysfunction No     Pericardial Effusion TRIVIAL     Tricuspid Valve Regurgitation TRIVIAL          ASSESSMENT/PLAN:         Anesthesia Evaluation    I have reviewed the Patient Summary Reports.     I have reviewed the Medications.     Review of Systems  Anesthesia Hx:  No problems with previous Anesthesia  History of prior surgery of interest to airway management or planning: liver transplant. Previous anesthesia: General Denies Family Hx of Anesthesia complications.   Denies Personal  Hx of Anesthesia complications.   Social:  Former Smoker, No Alcohol Use    Hematology/Oncology:  Hematology Normal   Oncology Normal     EENT/Dental:EENT/Dental Normal   Cardiovascular:   Exercise tolerance: good Hypertension, well controlled    Pulmonary:  Pulmonary Normal    Renal/:   Chronic Renal Disease, ARF, Dialysis    Hepatic/GI:   GERD Liver Disease, Hepatitis    Musculoskeletal:  Musculoskeletal Normal    Neurological:   Seizures, well controlled    Endocrine:   Hypothyroidism    Dermatological:  Skin Normal    Psych:   Psychiatric History          Physical Exam  General:  Well nourished    Airway/Jaw/Neck:  Airway Findings: Mouth Opening: Normal Tongue: Normal  General Airway Assessment: Adult  Mallampati: II  TM Distance: Normal, at least 6 cm  Jaw/Neck Findings:  Neck ROM: Normal ROM  Neck Findings: Normal     Dental:  Dental Findings: In tact   Chest/Lungs:  Chest/Lungs Findings: Normal Respiratory Rate     Heart/Vascular:  Heart Findings: Rate: Normal  Rhythm: Regular Rhythm  Sounds: Normal  Heart murmur: negative Vascular Findings: Normal     Musculoskeletal:  Musculoskeletal Findings: Normal   Skin:  Skin Findings: Normal    Mental Status:  Mental Status Findings:  Cooperative         Anesthesia Plan  Type of Anesthesia, risks & benefits discussed:  Anesthesia Type:  general, MAC  Patient's Preference:   Intra-op Monitoring Plan: standard ASA monitors  Intra-op Monitoring Plan Comments:   Post Op Pain Control Plan:   Post Op Pain Control Plan Comments:   Induction:   IV  Beta Blocker:  Patient is not currently on a Beta-Blocker (No further documentation required).       Informed Consent: Patient representative understands risks and agrees with Anesthesia plan.  Questions answered. Anesthesia consent signed with patient representative.  ASA Score: 4     Day of Surgery Review of History & Physical:  There are no significant changes.  H&P update referred to the provider.         Ready For Surgery  From Anesthesia Perspective.

## 2018-02-28 NOTE — ASSESSMENT & PLAN NOTE
- tbili with increase prompting ERCP 1/30 with stones and sludge, stents placed.  -  Repeat ERCP 2/19 as was having recurrent fevers with bilirubin stuck at 2.0-2.2 with overall unremarkable findings seen. Did place new larger stents. Bilirubin now improving. Will need repeat ERCP in 10 weeks.

## 2018-03-01 ENCOUNTER — ANESTHESIA (OUTPATIENT)
Dept: ENDOSCOPY | Facility: HOSPITAL | Age: 28
End: 2018-03-01

## 2018-03-01 ENCOUNTER — TELEPHONE (OUTPATIENT)
Dept: TRANSPLANT | Facility: CLINIC | Age: 28
End: 2018-03-01

## 2018-03-01 LAB
ALBUMIN SERPL BCP-MCNC: 1.6 G/DL
ALP SERPL-CCNC: 105 U/L
ALT SERPL W/O P-5'-P-CCNC: <5 U/L
ANION GAP SERPL CALC-SCNC: 9 MMOL/L
ANISOCYTOSIS BLD QL SMEAR: SLIGHT
AST SERPL-CCNC: 28 U/L
BASOPHILS # BLD AUTO: ABNORMAL K/UL
BASOPHILS NFR BLD: 0 %
BILIRUB SERPL-MCNC: 1.4 MG/DL
BUN SERPL-MCNC: 22 MG/DL
CALCIUM SERPL-MCNC: 8.5 MG/DL
CHLORIDE SERPL-SCNC: 106 MMOL/L
CO2 SERPL-SCNC: 21 MMOL/L
CREAT SERPL-MCNC: 3.1 MG/DL
DIFFERENTIAL METHOD: ABNORMAL
EBV DNA # BLD NAA+PROBE: NORMAL {COPIES}/ML
EBV DNA BY PCR: NORMAL
EOSINOPHIL # BLD AUTO: ABNORMAL K/UL
EOSINOPHIL NFR BLD: 0 %
ERYTHROCYTE [DISTWIDTH] IN BLOOD BY AUTOMATED COUNT: 19 %
EST. GFR  (AFRICAN AMERICAN): 30 ML/MIN/1.73 M^2
EST. GFR  (NON AFRICAN AMERICAN): 26 ML/MIN/1.73 M^2
FUNGUS SPEC CULT: NORMAL
GLUCOSE SERPL-MCNC: 81 MG/DL
HCT VFR BLD AUTO: 24.1 %
HGB BLD-MCNC: 7.8 G/DL
IMM GRANULOCYTES # BLD AUTO: ABNORMAL K/UL
IMM GRANULOCYTES NFR BLD AUTO: ABNORMAL %
LOWER 95% CONFIDENCE INTERVAL: NORMAL
LYMPHOCYTES # BLD AUTO: ABNORMAL K/UL
LYMPHOCYTES NFR BLD: 32 %
MAGNESIUM SERPL-MCNC: 2 MG/DL
MCH RBC QN AUTO: 30.7 PG
MCHC RBC AUTO-ENTMCNC: 32.4 G/DL
MCV RBC AUTO: 95 FL
MONOCYTES # BLD AUTO: ABNORMAL K/UL
MONOCYTES NFR BLD: 26 %
MYELOCYTES NFR BLD MANUAL: 1 %
NEUTROPHILS NFR BLD: 41 %
NRBC BLD-RTO: 0 /100 WBC
OVALOCYTES BLD QL SMEAR: ABNORMAL
PHOSPHATE SERPL-MCNC: 2.3 MG/DL
PLATELET # BLD AUTO: 158 K/UL
PLATELET BLD QL SMEAR: ABNORMAL
PMV BLD AUTO: 10.6 FL
POIKILOCYTOSIS BLD QL SMEAR: SLIGHT
POTASSIUM SERPL-SCNC: 3.8 MMOL/L
PROT SERPL-MCNC: 5.1 G/DL
RBC # BLD AUTO: 2.54 M/UL
SODIUM SERPL-SCNC: 136 MMOL/L
TACROLIMUS BLD-MCNC: 6.1 NG/ML
UPPER 95% CONFIDENCE INTERVAL: NORMAL
WBC # BLD AUTO: 3.57 K/UL

## 2018-03-01 PROCEDURE — 25000003 PHARM REV CODE 250: Performed by: NURSE PRACTITIONER

## 2018-03-01 PROCEDURE — 81268 CHIMERISM ANAL W/CELL SELECT: CPT | Mod: 91

## 2018-03-01 PROCEDURE — 63600175 PHARM REV CODE 636 W HCPCS: Performed by: PHYSICIAN ASSISTANT

## 2018-03-01 PROCEDURE — 81265 STR MARKERS SPECIMEN ANAL: CPT

## 2018-03-01 PROCEDURE — 63600175 PHARM REV CODE 636 W HCPCS: Mod: JG | Performed by: SURGERY

## 2018-03-01 PROCEDURE — 99233 SBSQ HOSP IP/OBS HIGH 50: CPT | Mod: ,,, | Performed by: INTERNAL MEDICINE

## 2018-03-01 PROCEDURE — 25000003 PHARM REV CODE 250: Performed by: PHYSICIAN ASSISTANT

## 2018-03-01 PROCEDURE — 81268 CHIMERISM ANAL W/CELL SELECT: CPT

## 2018-03-01 PROCEDURE — 83735 ASSAY OF MAGNESIUM: CPT

## 2018-03-01 PROCEDURE — 80053 COMPREHEN METABOLIC PANEL: CPT

## 2018-03-01 PROCEDURE — 63600175 PHARM REV CODE 636 W HCPCS: Performed by: NURSE PRACTITIONER

## 2018-03-01 PROCEDURE — 84100 ASSAY OF PHOSPHORUS: CPT

## 2018-03-01 PROCEDURE — 87798 DETECT AGENT NOS DNA AMP: CPT

## 2018-03-01 PROCEDURE — A4217 STERILE WATER/SALINE, 500 ML: HCPCS | Performed by: PHYSICIAN ASSISTANT

## 2018-03-01 PROCEDURE — 94664 DEMO&/EVAL PT USE INHALER: CPT

## 2018-03-01 PROCEDURE — 85027 COMPLETE CBC AUTOMATED: CPT

## 2018-03-01 PROCEDURE — B4185 PARENTERAL SOL 10 GM LIPIDS: HCPCS | Performed by: PHYSICIAN ASSISTANT

## 2018-03-01 PROCEDURE — 85007 BL SMEAR W/DIFF WBC COUNT: CPT

## 2018-03-01 PROCEDURE — 25000003 PHARM REV CODE 250: Performed by: SURGERY

## 2018-03-01 PROCEDURE — 99900035 HC TECH TIME PER 15 MIN (STAT)

## 2018-03-01 PROCEDURE — 80197 ASSAY OF TACROLIMUS: CPT

## 2018-03-01 PROCEDURE — 20600001 HC STEP DOWN PRIVATE ROOM

## 2018-03-01 PROCEDURE — 94761 N-INVAS EAR/PLS OXIMETRY MLT: CPT

## 2018-03-01 PROCEDURE — 99233 SBSQ HOSP IP/OBS HIGH 50: CPT | Mod: ,,, | Performed by: PHYSICIAN ASSISTANT

## 2018-03-01 RX ORDER — SODIUM CHLORIDE 9 MG/ML
INJECTION, SOLUTION INTRAVENOUS ONCE
Status: COMPLETED | OUTPATIENT
Start: 2018-03-02 | End: 2018-03-02

## 2018-03-01 RX ORDER — SODIUM CHLORIDE 9 MG/ML
INJECTION, SOLUTION INTRAVENOUS
Status: DISCONTINUED | OUTPATIENT
Start: 2018-03-01 | End: 2018-03-05

## 2018-03-01 RX ADMIN — SODIUM CHLORIDE: 9 INJECTION, SOLUTION INTRAVENOUS at 01:03

## 2018-03-01 RX ADMIN — METOCLOPRAMIDE HYDROCHLORIDE 10 MG: 5 SOLUTION ORAL at 12:03

## 2018-03-01 RX ADMIN — BISACODYL 10 MG: 5 TABLET, COATED ORAL at 08:03

## 2018-03-01 RX ADMIN — METOCLOPRAMIDE HYDROCHLORIDE 10 MG: 5 SOLUTION ORAL at 09:03

## 2018-03-01 RX ADMIN — OXYCODONE HYDROCHLORIDE 10 MG: 5 TABLET ORAL at 01:03

## 2018-03-01 RX ADMIN — ONDANSETRON HYDROCHLORIDE 4 MG: 2 INJECTION, SOLUTION INTRAMUSCULAR; INTRAVENOUS at 06:03

## 2018-03-01 RX ADMIN — SODIUM CHLORIDE: 234 INJECTION INTRAMUSCULAR; INTRAVENOUS; SUBCUTANEOUS at 09:03

## 2018-03-01 RX ADMIN — LEVETIRACETAM 500 MG: 100 SOLUTION ORAL at 09:03

## 2018-03-01 RX ADMIN — OXYCODONE HYDROCHLORIDE 10 MG: 5 TABLET ORAL at 12:03

## 2018-03-01 RX ADMIN — METOCLOPRAMIDE HYDROCHLORIDE 10 MG: 5 SOLUTION ORAL at 05:03

## 2018-03-01 RX ADMIN — URSODIOL 300 MG: 300 CAPSULE ORAL at 08:03

## 2018-03-01 RX ADMIN — URSODIOL 300 MG: 300 CAPSULE ORAL at 09:03

## 2018-03-01 RX ADMIN — FENTANYL CITRATE 25 MCG: 50 INJECTION INTRAMUSCULAR; INTRAVENOUS at 01:03

## 2018-03-01 RX ADMIN — CYCLOSPORINE 100 MG: 100 CAPSULE, LIQUID FILLED ORAL at 05:03

## 2018-03-01 RX ADMIN — OXYCODONE HYDROCHLORIDE 10 MG: 5 TABLET ORAL at 07:03

## 2018-03-01 RX ADMIN — DRONABINOL 2.5 MG: 2.5 CAPSULE ORAL at 05:03

## 2018-03-01 RX ADMIN — LEVOTHYROXINE SODIUM 75 MCG: 75 TABLET ORAL at 05:03

## 2018-03-01 RX ADMIN — SODIUM CHLORIDE 0.5 G: 0.9 INJECTION, SOLUTION INTRAVENOUS at 09:03

## 2018-03-01 RX ADMIN — SOYBEAN OIL 250 ML: 20 INJECTION, SOLUTION INTRAVENOUS at 09:03

## 2018-03-01 RX ADMIN — OXYCODONE HYDROCHLORIDE 10 MG: 5 TABLET ORAL at 06:03

## 2018-03-01 RX ADMIN — OMEPRAZOLE 40 MG: 40 CAPSULE, DELAYED RELEASE ORAL at 07:03

## 2018-03-01 RX ADMIN — LEVETIRACETAM 500 MG: 100 SOLUTION ORAL at 08:03

## 2018-03-01 RX ADMIN — ONDANSETRON HYDROCHLORIDE 4 MG: 2 INJECTION, SOLUTION INTRAMUSCULAR; INTRAVENOUS at 09:03

## 2018-03-01 NOTE — PLAN OF CARE
Problem: Patient Care Overview  Goal: Plan of Care Review  Outcome: Ongoing (interventions implemented as appropriate)  Pt AAOx4, VSS, pt very fatigued after HD, Tmax- 99.4, ertapenem +isaconvusonium IV antibiotics given, latest WBC- 2.33, Neupogen shot given yesterday. Sedimentation rate elevated yesterday= 66, EBV cultures taken, awaiting results.  - TPN remains at 48 cc/hr, lipids overnight 20.8 cc/hr-  received PRN IV zofran and scheduled PO reglan x1 for continuous nausea, 0% of dinner eaten, has been NPO since MN for EGD in AM  - 0 UOP overnight, HD yesterday- 3 L removed  - Requests PO oxycodone x1 overnight for back pain, and fentanyl IVP x1,   - mother to remain at bedside, no acute events overnight  - Bed in lowest position, non skid socks worn, call light within reach. Will continue to monitor.

## 2018-03-01 NOTE — PROGRESS NOTES
[facility]  Infectious Disease  Progress Note    Patient Name: Jhonny Diana  MRN: [id number]  Admission Date: 1/11/2018  Length of Stay: 49 days  Attending Physician: Nathanael Medina MD  Primary Care Provider: Primary Doctor No    Isolation Status: No active isolations  Assessment/Plan:      Fever    26 y/o man w/a history of asthma and alcoholic cirrhosis (c/b HE, EV, portal HTN, and HRS; s/p DDLT 10/19/2017, CMV D+/R+, steroid induction, on maintenance tacro/MMF/pred; c/b seizures, LAURA, superificial wound infection s/p wound vac to [facility] through 1/12, and several recent admissions on 11/24 and 12/3 with culture negative peritonitis with peak WBC ~5k due to suspected indolent biliary leakage s/p sphincterotomy/biliary stent placement over CBD stricture on 12/6 ERCP although notably with no overt leak noted during procedure) who was admitted on 1/11/2017 with acute onset fevers and acute on chronic N/V/abdominal pain (RLQ worst) in the setting of persistent noninflammatory ascites of unknown etiology. He underwent repeated drainage of pleural fluid and ascites that did not show evidence of gross infection and given that fevers were antibiotic responsive and walled off biloma was suspected, he underwent ex-lap on 2/8/18 where a walled off presumed partly bilious collection was noted in the RLQ and debrided (cultures negative). ERCP 2/19/18: 2 biliary tree stents removed, 2 stents placed on CBD.  - WBC scan 2/27/18 negative  - A/P CT scan 2/27/18: focal/loculated region of fluid in the lower right abdomen measuring approximately 8.7 x 5.5 cm (no fluid was able to be drained)  - continue Ertapenem                Anticipated Disposition: pending    Thank you for your consult. I will follow-up with patient. Please contact us if you have any additional questions.    Rk Che MD  Infectious Disease Fellow, PGY-5  Spectra: [phone]  Pager: [phone]  [facility]    Subjective:      Principal Problem:Fever    HPI: No notes on file  Interval History: still with low grade fever, nauseas and vomits,     Review of Systems   Constitutional: Negative for chills, fatigue and fever.   HENT: Negative for sore throat.    Respiratory: Negative for cough, chest tightness and shortness of breath.    Cardiovascular: Negative for chest pain, palpitations and leg swelling.   Gastrointestinal: Positive for abdominal pain and nausea. Negative for abdominal distention and diarrhea.   Genitourinary: Negative for dysuria, flank pain and urgency.   Skin: Negative for rash.   Neurological: Negative for dizziness, light-headedness and numbness.   Psychiatric/Behavioral: Negative for confusion.     Objective:     Vital Signs (Most Recent):  Temp: 98.9 °F (37.2 °C) (03/01/18 0800)  Pulse: 86 (03/01/18 0826)  Resp: 20 (03/01/18 0826)  BP: 139/84 (03/01/18 0800)  SpO2: 96 % (03/01/18 0826) Vital Signs (24h Range):  Temp:  [98.9 °F (37.2 °C)-100.3 °F (37.9 °C)] 98.9 °F (37.2 °C)  Pulse:  [79-93] 86  Resp:  [16-20] 20  SpO2:  [92 %-96 %] 96 %  BP: (112-148)/(66-85) 139/84     Weight: 59 kg (130 lb 1.1 oz)  Body mass index is 20.99 kg/m².    Estimated Creatinine Clearance: 29.6 mL/min (A) (based on SCr of 3.1 mg/dL (H)).    Physical Exam   Constitutional: He is oriented to person, place, and time.   HENT:   Mouth/Throat: No oropharyngeal exudate.   Eyes: No scleral icterus.   Cardiovascular: Normal rate and regular rhythm.    Pulmonary/Chest: Effort normal and breath sounds normal. No respiratory distress. He has no wheezes. He has no rales.   Abdominal: Soft. There is tenderness. There is no rebound.   Musculoskeletal: He exhibits edema.   Lymphadenopathy:     He has no cervical adenopathy.   Neurological: He is alert and oriented to person, place, and time.   Skin: He is not diaphoretic.       Significant Labs:   Bilirubin:     Recent Labs  Lab 02/26/18  0409 02/26/18  1418 02/27/18  0500 02/28/18  0500 03/01/18  0600    BILITOT 1.7* 1.7* 1.5* 1.4* 1.4*     Blood Culture:     Recent Labs  Lab 01/30/18  1916 02/11/18  1236 02/11/18  1241 02/15/18  1729 02/21/18  0001   LABBLOO No growth after 5 days.  No growth after 5 days. No growth after 5 days. No growth after 5 days. No growth after 5 days.  No growth after 5 days. No growth after 5 days.  No growth after 5 days.     BMP:     Recent Labs  Lab 03/01/18  0600   GLU 81      K 3.8      CO2 21*   BUN 22*   CREATININE 3.1*   CALCIUM 8.5*   MG 2.0     CBC:     Recent Labs  Lab 02/28/18  0500 03/01/18  0600   WBC 2.33* 3.57*   HGB 6.9* 7.8*   HCT 21.1* 24.1*    158     CMP:     Recent Labs  Lab 02/28/18  0500 03/01/18  0600    136   K 3.8 3.8    106   CO2 26 21*   GLU 91 81   BUN 28* 22*   CREATININE 4.4* 3.1*   CALCIUM 8.9 8.5*   PROT 5.0* 5.1*   ALBUMIN 1.8* 1.6*   BILITOT 1.4* 1.4*   ALKPHOS 92 105   AST 23 28   ALT <5* <5*   ANIONGAP 8 9   EGFRNONAA 17.0* 26.0*     Microbiology Results (last 7 days)     Procedure Component Value Units Date/Time    Urine culture [[id number]] Collected:  02/27/18 1000    Order Status:  Completed Specimen:  Urine from Urine, Clean Catch Updated:  02/28/18 1809     Urine Culture, Routine No growth    Culture, Anaerobe [[id number]]     Order Status:  No result Specimen:  Body Fluid from Peritoneal Fluid     Gram stain [[id number]]     Order Status:  No result Specimen:  Body Fluid from Peritoneal Fluid     Fungus culture [[id number]]     Order Status:  No result Specimen:  Body Fluid from Peritoneal Fluid     Aerobic culture [[id number]]     Order Status:  No result Specimen:  Body Fluid from Peritoneal Fluid     Blood culture [[id number]] Collected:  02/21/18 0001    Order Status:  Completed Specimen:  Blood Updated:  02/26/18 0612     Blood Culture, Routine No growth after 5 days.    Blood culture [[id number]] Collected:  02/21/18 0001    Order Status:  Completed Specimen:  Blood Updated:  02/26/18 0612     Blood Culture,  Routine No growth after 5 days.    Culture, Anaerobic [[id number]] Collected:  02/16/18 1411    Order Status:  Completed Specimen:  Body Fluid from Pleural Fluid Updated:  02/23/18 0951     Anaerobic Culture No anaerobes isolated    AFB Culture & Smear [[id number]] Collected:  01/11/18 1335    Order Status:  Completed Specimen:  Pleural Fluid from Lung, Left Updated:  02/23/18 0927     AFB Culture & Smear Culture in progress     AFB Culture & Smear Culture in progress     AFB CULTURE STAIN No acid fast bacilli seen.          Significant Imaging: I have reviewed all pertinent imaging results/findings within the past 24 hours.

## 2018-03-01 NOTE — SUBJECTIVE & OBJECTIVE
Scheduled Meds:   bisacodyl  10 mg Oral Daily    cycloSPORINE modified  100 mg Oral BID    docusate sodium  100 mg Oral BID    dronabinol  2.5 mg Oral Daily    epoetin maurisio (PROCRIT) injection  10,000 Units Intravenous Every Mon, Wed, Fri    ertapenem (INVANZ) IVPB  500 mg Intravenous Q24H    fat emulsion 20%  250 mL Intravenous Daily    heparin (porcine)  5,000 Units Subcutaneous Q8H    custom IVPB builder   Intravenous Q24H    levetiracetam oral soln  500 mg Oral BID    levothyroxine  75 mcg Oral Before breakfast    metoclopramide HCl  10 mg Oral QID (AC & HS)    multivitamin  1 tablet Oral Daily    omeprazole  40 mg Oral QAM    polyethylene glycol  17 g Oral BID    psyllium husk (aspartame)  3.4 g Oral BID    ursodiol  300 mg Oral BID     Continuous Infusions:   TPN ADULT CENTRAL LINE CUSTOM 48 mL/hr at 02/28/18 2147    TPN ADULT CENTRAL LINE CUSTOM       PRN Meds:sodium chloride, sodium chloride 0.9%, acetaminophen, albuterol-ipratropium 2.5mg-0.5mg/3mL, bisacodyl, dextrose 50%, dextrose 50%, fentaNYL, glucagon (human recombinant), glucose, glucose, heparin (porcine), naloxone, ondansetron, ondansetron, oxyCODONE, prochlorperazine, simethicone, sodium chloride 0.9%    Review of Systems   Constitutional: Positive for activity change, appetite change, fatigue and fever. Negative for chills.   HENT: Negative.  Negative for congestion and facial swelling.    Eyes: Negative for pain, discharge and visual disturbance.   Respiratory: Negative for cough, chest tightness, shortness of breath and wheezing.    Cardiovascular: Negative for chest pain, palpitations and leg swelling.   Gastrointestinal: Positive for abdominal distention, abdominal pain and nausea. Negative for constipation.   Endocrine: Negative.    Genitourinary: Positive for decreased urine volume. Negative for difficulty urinating.        Anuric   Musculoskeletal: Positive for back pain (improved). Negative for arthralgias and myalgias.    Skin: Positive for color change and wound. Negative for rash.   Allergic/Immunologic: Positive for immunocompromised state.   Neurological: Positive for weakness. Negative for dizziness, seizures and headaches.   Psychiatric/Behavioral: Positive for decreased concentration and dysphoric mood. Negative for confusion. The patient is not nervous/anxious.    All other systems reviewed and are negative.    Objective:     Vital Signs (Most Recent):  Temp: 99.1 °F (37.3 °C) (03/01/18 1218)  Pulse: 82 (03/01/18 1500)  Resp: 20 (03/01/18 1500)  BP: 135/83 (03/01/18 1218)  SpO2: 97 % (03/01/18 1500) Vital Signs (24h Range):  Temp:  [98.9 °F (37.2 °C)-100.3 °F (37.9 °C)] 99.1 °F (37.3 °C)  Pulse:  [81-93] 82  Resp:  [16-20] 20  SpO2:  [92 %-97 %] 97 %  BP: (112-148)/(66-85) 135/83     Weight: 59 kg (130 lb 1.1 oz)  Body mass index is 20.99 kg/m².    Intake/Output - Last 3 Shifts       02/27 0700 - 02/28 0659 02/28 0700 - 03/01 0659 03/01 0700 - 03/02 0659    P.O. 340 75     I.V. (mL/kg) 0 (0) 0 (0)     Blood  212     Other 0 810     IV Piggyback 850 100     .1 83.4     Total Intake(mL/kg) 1893.1 (28.3) 1280.4 (21.7)     Urine (mL/kg/hr) 50 (0) 0 (0)     Emesis/NG output 0 (0) 0 (0)     Other 0 (0) 2810 (2)     Stool 0 (0) 0 (0)     Blood 0 (0) 0 (0)     Total Output 50 2810      Net +1843.1 -1529.6             Urine Occurrence 0 x 0 x     Stool Occurrence 1 x 2 x     Emesis Occurrence 0 x 0 x           Physical Exam   Constitutional: He is oriented to person, place, and time. He appears well-developed. No distress.   Temporal and distal extremity muscle wasting   HENT:   Head: Normocephalic and atraumatic.   Mouth/Throat: No oropharyngeal exudate.   dylan tube   Eyes: EOM are normal. Pupils are equal, round, and reactive to light. Scleral icterus is present.   Neck: Normal range of motion. Neck supple. No JVD present. No thyromegaly present.   Cardiovascular: Normal rate, regular rhythm, normal heart sounds and intact  distal pulses.    No murmur heard.  Pulmonary/Chest: Effort normal. No respiratory distress. He has decreased breath sounds in the right middle field, the right lower field and the left lower field. He has no wheezes. He exhibits no tenderness.   Diminished to RLL   Abdominal: Soft. Bowel sounds are normal. He exhibits ascites. He exhibits no distension. There is tenderness. There is no rebound and no guarding.   Dressing to chevron.  Wd vac removed 1/12/18  Dependent edema present R flank; surrounding erythema   Musculoskeletal: Normal range of motion. He exhibits edema (2+ LE edema). He exhibits no tenderness.   Neurological: He is alert and oriented to person, place, and time. He has normal reflexes.   Skin: Skin is warm and dry. Capillary refill takes 2 to 3 seconds. He is not diaphoretic. No erythema.   jaundice   Psychiatric: He has a normal mood and affect. His behavior is normal. Judgment and thought content normal. His mood appears not anxious.   Depressed mood   Nursing note and vitals reviewed.      Laboratory:  Immunosuppressants         Stop Route Frequency     cycloSPORINE modified (NEORAL) capsule 100 mg      -- Oral 2 times daily        CBC:     Recent Labs  Lab 03/01/18  0600   WBC 3.57*   RBC 2.54*   HGB 7.8*   HCT 24.1*      MCV 95   MCH 30.7   MCHC 32.4     CMP:     Recent Labs  Lab 03/01/18  0600   GLU 81   CALCIUM 8.5*   ALBUMIN 1.6*   PROT 5.1*      K 3.8   CO2 21*      BUN 22*   CREATININE 3.1*   ALKPHOS 105   ALT <5*   AST 28   BILITOT 1.4*     Labs within the past 24 hours have been reviewed.    Diagnostic Results:  pertinent imaging reviewed

## 2018-03-01 NOTE — SUBJECTIVE & OBJECTIVE
Interval History: still with low grade fever, nauseas and vomits,     Review of Systems   Constitutional: Negative for chills, fatigue and fever.   HENT: Negative for sore throat.    Respiratory: Negative for cough, chest tightness and shortness of breath.    Cardiovascular: Negative for chest pain, palpitations and leg swelling.   Gastrointestinal: Positive for abdominal pain and nausea. Negative for abdominal distention and diarrhea.   Genitourinary: Negative for dysuria, flank pain and urgency.   Skin: Negative for rash.   Neurological: Negative for dizziness, light-headedness and numbness.   Psychiatric/Behavioral: Negative for confusion.     Objective:     Vital Signs (Most Recent):  Temp: 98.9 °F (37.2 °C) (03/01/18 0800)  Pulse: 86 (03/01/18 0826)  Resp: 20 (03/01/18 0826)  BP: 139/84 (03/01/18 0800)  SpO2: 96 % (03/01/18 0826) Vital Signs (24h Range):  Temp:  [98.9 °F (37.2 °C)-100.3 °F (37.9 °C)] 98.9 °F (37.2 °C)  Pulse:  [79-93] 86  Resp:  [16-20] 20  SpO2:  [92 %-96 %] 96 %  BP: (112-148)/(66-85) 139/84     Weight: 59 kg (130 lb 1.1 oz)  Body mass index is 20.99 kg/m².    Estimated Creatinine Clearance: 29.6 mL/min (A) (based on SCr of 3.1 mg/dL (H)).    Physical Exam   Constitutional: He is oriented to person, place, and time.   HENT:   Mouth/Throat: No oropharyngeal exudate.   Eyes: No scleral icterus.   Cardiovascular: Normal rate and regular rhythm.    Pulmonary/Chest: Effort normal and breath sounds normal. No respiratory distress. He has no wheezes. He has no rales.   Abdominal: Soft. There is tenderness. There is no rebound.   Musculoskeletal: He exhibits edema.   Lymphadenopathy:     He has no cervical adenopathy.   Neurological: He is alert and oriented to person, place, and time.   Skin: He is not diaphoretic.       Significant Labs:   Bilirubin:     Recent Labs  Lab 02/26/18  0409 02/26/18  1418 02/27/18  0500 02/28/18  0500 03/01/18  0600   BILITOT 1.7* 1.7* 1.5* 1.4* 1.4*     Blood Culture:      Recent Labs  Lab 01/30/18  1916 02/11/18  1236 02/11/18  1241 02/15/18  1729 02/21/18  0001   LABBLOO No growth after 5 days.  No growth after 5 days. No growth after 5 days. No growth after 5 days. No growth after 5 days.  No growth after 5 days. No growth after 5 days.  No growth after 5 days.     BMP:     Recent Labs  Lab 03/01/18  0600   GLU 81      K 3.8      CO2 21*   BUN 22*   CREATININE 3.1*   CALCIUM 8.5*   MG 2.0     CBC:     Recent Labs  Lab 02/28/18  0500 03/01/18  0600   WBC 2.33* 3.57*   HGB 6.9* 7.8*   HCT 21.1* 24.1*    158     CMP:     Recent Labs  Lab 02/28/18  0500 03/01/18  0600    136   K 3.8 3.8    106   CO2 26 21*   GLU 91 81   BUN 28* 22*   CREATININE 4.4* 3.1*   CALCIUM 8.9 8.5*   PROT 5.0* 5.1*   ALBUMIN 1.8* 1.6*   BILITOT 1.4* 1.4*   ALKPHOS 92 105   AST 23 28   ALT <5* <5*   ANIONGAP 8 9   EGFRNONAA 17.0* 26.0*     Microbiology Results (last 7 days)     Procedure Component Value Units Date/Time    Urine culture [[id number]] Collected:  02/27/18 1000    Order Status:  Completed Specimen:  Urine from Urine, Clean Catch Updated:  02/28/18 1809     Urine Culture, Routine No growth    Culture, Anaerobe [[id number]]     Order Status:  No result Specimen:  Body Fluid from Peritoneal Fluid     Gram stain [[id number]]     Order Status:  No result Specimen:  Body Fluid from Peritoneal Fluid     Fungus culture [[id number]]     Order Status:  No result Specimen:  Body Fluid from Peritoneal Fluid     Aerobic culture [[id number]]     Order Status:  No result Specimen:  Body Fluid from Peritoneal Fluid     Blood culture [[id number]] Collected:  02/21/18 0001    Order Status:  Completed Specimen:  Blood Updated:  02/26/18 0612     Blood Culture, Routine No growth after 5 days.    Blood culture [[id number]] Collected:  02/21/18 0001    Order Status:  Completed Specimen:  Blood Updated:  02/26/18 0612     Blood Culture, Routine No growth after 5 days.    Culture, Anaerobic  [[id number]] Collected:  02/16/18 1411    Order Status:  Completed Specimen:  Body Fluid from Pleural Fluid Updated:  02/23/18 0951     Anaerobic Culture No anaerobes isolated    AFB Culture & Smear [[id number]] Collected:  01/11/18 1335    Order Status:  Completed Specimen:  Pleural Fluid from Lung, Left Updated:  02/23/18 0927     AFB Culture & Smear Culture in progress     AFB Culture & Smear Culture in progress     AFB CULTURE STAIN No acid fast bacilli seen.          Significant Imaging: I have reviewed all pertinent imaging results/findings within the past 24 hours.

## 2018-03-01 NOTE — ASSESSMENT & PLAN NOTE
26 y/o man w/a history of asthma and alcoholic cirrhosis (c/b HE, EV, portal HTN, and HRS; s/p DDLT 10/19/2017, CMV D+/R+, steroid induction, on maintenance tacro/MMF/pred; c/b seizures, LAURA, superificial wound infection s/p wound vac to [facility] through 1/12, and several recent admissions on 11/24 and 12/3 with culture negative peritonitis with peak WBC ~5k due to suspected indolent biliary leakage s/p sphincterotomy/biliary stent placement over CBD stricture on 12/6 ERCP although notably with no overt leak noted during procedure) who was admitted on 1/11/2017 with acute onset fevers and acute on chronic N/V/abdominal pain (RLQ worst) in the setting of persistent noninflammatory ascites of unknown etiology. He underwent repeated drainage of pleural fluid and ascites that did not show evidence of gross infection and given that fevers were antibiotic responsive and walled off biloma was suspected, he underwent ex-lap on 2/8/18 where a walled off presumed partly bilious collection was noted in the RLQ and debrided (cultures negative). ERCP 2/19/18: 2 biliary tree stents removed, 2 stents placed on CBD.  - WBC scan 2/27/18 negative  - A/P CT scan 2/27/18: focal/loculated region of fluid in the lower right abdomen measuring approximately 8.7 x 5.5 cm (no fluid was able to be drained)  - continue Ertapenem

## 2018-03-01 NOTE — ASSESSMENT & PLAN NOTE
- h/o constipation, reports having regular BM's with bowel regimen.  - vomiting episode x 1 at home. Anti-emetics ordered PRN.  - KUB repeated 1/14/18 given increased abdominal pain- mild, generalized bowel distention suggesting ileus.  Diet changed to clears as tolerated for bowel rest .  - SHANE tube placed 1/16 for tube feeds. Not tolerating tube feeds, stopped 1/17/18.  - D/C shane 1/19, now in place as of 1/25.  - chronic constipation, Cont bowel regimen.   - See protein calorie malnutrition.  - TF have been on hold since 2/20 for N/V.   - D/C shane 2/23 as not tolerating TF  - 2/24 changes: Increased Reglan to 10 mg, D/C atovaquone, transitioned cyclo to Prograf, change twyla to BID, check Keppra level  - GI consulted. Plan for EGD beginning of next week as with continued nausea/vomiting with po intake. Appreciate GI assistance.

## 2018-03-01 NOTE — ASSESSMENT & PLAN NOTE
- CMV detected 2/1 now on tx dose valcyte. CMV PCR 2/15 not detected. CMV PCR 2/22, negative   -Repeat CMV PCR 3/1 pending.   - Pentam given 2/24

## 2018-03-01 NOTE — TELEPHONE ENCOUNTER
EZEKIEL received a phone call from Khadijah  at [facility] this morning.   [facility] is asking that when patient ready for dc from the hospital, to please send updated [place] info on patient [place] [facility]   They will admit patient as an ESRD patient when he returns to outpatient HD as opposed to an acute patient.   No other psychosocial concerns identified at this time.  SW remains available.

## 2018-03-01 NOTE — PROGRESS NOTES
Follow up on chevron wound   More superficial , pink and clean      03/01/18 1200       Incision/Site 10/30/17 0911 abdomen transverse   Date First Assessed/Time First Assessed: 10/30/17 0911   Present Prior to Hospital Arrival?: Yes  Location: abdomen  Orientation: transverse   Wound Image    Incision WDL ex   Dressing Appearance Clean;Dry;Intact   Drainage Amount Scant   Drainage Characteristics/Odor Serous   Appearance Pink;Red;Moist   Red (%), Wound Tissue Color 100 %   Periwound Area Intact   Wound Edges Open   Wound Length (cm) 0.7   Wound Width (cm) 4   Depth (cm) 0.1   Care Cleansed with:;Sterile normal saline   Dressing Applied;Hydrocolloid      Will continue same treatment  Zoë Murphy RN CWON  [id number]

## 2018-03-01 NOTE — PROGRESS NOTES
[facility]  Liver Transplant  Progress Note    Patient Name: Jhonny Diana  MRN: [id number]  Admission Date: 2018  Hospital Length of Stay: 49 days  Code Status: Full Code  Primary Care Provider: Primary Doctor No  Post-Operative Day: 133    ORGAN:   LIVER  Disease Etiology: Acute Alcoholic Hepatitis  Donor Type:    - Brain Death  CDC High Risk:   No  Donor CMV Status:   Donor CMV Status: Positive  Donor HBcAB:   Negative  Donor HCV Status:   Negative  Whole or Partial: Whole Liver  Biliary Anastomosis: End to End  Arterial Anatomy: Standard  Subjective:     History of Present Illness:  Jhonny Diana is a 28 y/o male with past medical history of alcoholic cirrhosis.  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 Found to have peritonitis in November (WBC 5000, 75% PNM) neg for bile leak. He was treated initially with vanc/cefepime. Repeat cell counts  with some improvement (WBC 1400, 45% PNM). He has undergone multiple paracenteses as well as abscess drainage of perihepatic fluid collections and treated with antimicrobial therapy but no positive cultures. Of note, biliary stricture also identified and ERCP performed on 2017 with sphincterotomy and biliary stent placed. Liver tests still have not normalized despite intervention, bilirubin and AP remain elevated. Other pertinent PMH current wound vac in place 2/2 wound infection, malnutrition requiring TPN for short course and ongoing hypoalbuminemia, and seizure activity while on prograf and has since been switched to cyclosporine without reoccurrence.  He presented to the ER for fever, abdominal pain, and N/V. He reports fever (103) for 1  day prior. Overnight, he developed N/V, reports small amount of green emesis with new left sided pain. He also endorses worsening SOB with exertion. He was scheduled as an outpatient for follow up paracentesis and IR drainage of fluid collection. CXR in ER shows large pleural effusion with subsegmental atelectasis. Infectious work up initiated in ER. His ANC is 900. Broad spectrum antibiotics initiated in ED. He denies chest pain, palpitations, diarrhea, constipation, or back pain. Denies any sick contacts.    Hospital Course:  Thoracentesis (1.2L off), Paracentesis (1.6L off), and IR drainage of fluid collection 1/11, all fluids negative for infection. ID consulted. Broad spectrum antibiotics d/c'd 1/15. Chronically malnourished with poor PO intake, prealbumin 7. Shane tube placed 1/15 for tube feedings.     ERCP 1/17 with sludge and a biliary stone which was removed and stent exchanged.  Remained with n/v; therefore, TF remained on hold and TPN continued.   Pt with fever s/p ERCP on 1/17 which continued until 1/19.  Vanc/cefepime restarted.  Blood cx 1/17 and 1/18 NGTD.  ID reconsulted.  Fungal markers sent.  CT C/A/P obtained.  With large R pleural effusion and ascites- both drained 1/19 and negative for infx per cell count. Pt afebrile 1/20. Liver biopsy 1/23 - without rejection.  EGD 1/25 - unremarkable for source of GI symptoms.  Resume diet along with other GI recommendations. SHANE tube placed for tube feeds.       2/22: Pt again spiked temp of 101 overnight, now down to 99. Resent Bcx overnight, prelim NGTD. Transitioned to Erta from vanc/zosyn per ID. Planning for tagged WBC scan on Monday. R sided abdominal erythema/dependent edema improving today.  TF have been on hold since 2/20. Will continue to hold today per pt request. Pt working on diet. KUB yesterday with significant stool and gas. Declines colace, miralax, metamucil, but willing to take daily bisacodyl. Pt agreeable to suppository today. If  suppository not successful can give mag citrate through dylan tonight. Will also retrial Marinol tonight as pt reported improvement in appetite previously. Will monitor closely for sedation.     2/23:  2 small BM 2/22.  Tolerated HD.  Dylan removed.  Will attempt to eat this PM.      2/24:  Pt ate then vomited hamburger 2/23.  Will resume TPN.  D/C atovaquone, transition cyclo to Prograf, check Keppra level, increase Reglan to 0 mg, change twyla to BID for nausea.  Will give pentam today.    2/25:  TPN going well, pt ate 1/2 subway sandwhich without vomiting yesterday, will continue to monitor.  Plan to repeat EGD this week.  Pt with drop in H/h again and appears to be hemolyzing.  LDH elevated, hapto slightly low, LEE pending.  With neutropenia- Neupogen given.    2/26:  Pt with severe back pain s/p HD. Unclear if secondary to neupogen.  Repeat labs stable. Indium scan scheduled for 2/27- injection done today (back pain started prior). CT A/P obtained with fluid collection seen in pelvis.    2/27: Patient spiking fevers, 101. Indium scan, negative results. CT A/P obtained 2/26 with focal/loculated region of fluid in the lower right abdomen measuring approximately 8.7 x 5.5 cm. IR attempted drainage, however, per IR,there was  limited fluid interposed with bowel in the RLQ with no safe pocket seen for drainage.    2/28: Remains with low grade fevers. Neupogen given.     3/1: No acute events overnight. Patient with stuttering speech, trouble recalling specific words when speaking. Does have hx of seizure (on keppra) previously while on prograf. Switched to prograf over the weekend from cyclosporine. Neuro exam unremarkable. CT head without contrast obtained, unremarkable. In light of these symptoms, prograf d/c, will restart cyclosporine. Per transplant attending, will hold off on EGD at this time as concern anesthesia will put patient at further increase risk for seizure activity. Will plan for potenial EGD on Monday.  Discussed with GI, appreciate their assistance.  Chimerism studies obtained today to assess for GVHD. EBV PCR pending. Adenovirus pending. Repeat CMV PCR pending. Monitor.     Scheduled Meds:   bisacodyl  10 mg Oral Daily    cycloSPORINE modified  100 mg Oral BID    docusate sodium  100 mg Oral BID    dronabinol  2.5 mg Oral Daily    epoetin maurisio (PROCRIT) injection  10,000 Units Intravenous Every Mon, Wed, Fri    ertapenem (INVANZ) IVPB  500 mg Intravenous Q24H    fat emulsion 20%  250 mL Intravenous Daily    heparin (porcine)  5,000 Units Subcutaneous Q8H    custom IVPB builder   Intravenous Q24H    levetiracetam oral soln  500 mg Oral BID    levothyroxine  75 mcg Oral Before breakfast    metoclopramide HCl  10 mg Oral QID (AC & HS)    multivitamin  1 tablet Oral Daily    omeprazole  40 mg Oral QAM    polyethylene glycol  17 g Oral BID    psyllium husk (aspartame)  3.4 g Oral BID    ursodiol  300 mg Oral BID     Continuous Infusions:   TPN ADULT CENTRAL LINE CUSTOM 48 mL/hr at 02/28/18 2147    TPN ADULT CENTRAL LINE CUSTOM       PRN Meds:sodium chloride, sodium chloride 0.9%, acetaminophen, albuterol-ipratropium 2.5mg-0.5mg/3mL, bisacodyl, dextrose 50%, dextrose 50%, fentaNYL, glucagon (human recombinant), glucose, glucose, heparin (porcine), naloxone, ondansetron, ondansetron, oxyCODONE, prochlorperazine, simethicone, sodium chloride 0.9%    Review of Systems   Constitutional: Positive for activity change, appetite change, fatigue and fever. Negative for chills.   HENT: Negative.  Negative for congestion and facial swelling.    Eyes: Negative for pain, discharge and visual disturbance.   Respiratory: Negative for cough, chest tightness, shortness of breath and wheezing.    Cardiovascular: Negative for chest pain, palpitations and leg swelling.   Gastrointestinal: Positive for abdominal distention, abdominal pain and nausea. Negative for constipation.   Endocrine: Negative.    Genitourinary:  Positive for decreased urine volume. Negative for difficulty urinating.        Anuric   Musculoskeletal: Positive for back pain (improved). Negative for arthralgias and myalgias.   Skin: Positive for color change and wound. Negative for rash.   Allergic/Immunologic: Positive for immunocompromised state.   Neurological: Positive for weakness. Negative for dizziness, seizures and headaches.   Psychiatric/Behavioral: Positive for decreased concentration and dysphoric mood. Negative for confusion. The patient is not nervous/anxious.    All other systems reviewed and are negative.    Objective:     Vital Signs (Most Recent):  Temp: 99.1 °F (37.3 °C) (03/01/18 1218)  Pulse: 82 (03/01/18 1500)  Resp: 20 (03/01/18 1500)  BP: 135/83 (03/01/18 1218)  SpO2: 97 % (03/01/18 1500) Vital Signs (24h Range):  Temp:  [98.9 °F (37.2 °C)-100.3 °F (37.9 °C)] 99.1 °F (37.3 °C)  Pulse:  [81-93] 82  Resp:  [16-20] 20  SpO2:  [92 %-97 %] 97 %  BP: (112-148)/(66-85) 135/83     Weight: 59 kg (130 lb 1.1 oz)  Body mass index is 20.99 kg/m².    Intake/Output - Last 3 Shifts       02/27 0700 - 02/28 0659 02/28 0700 - 03/01 0659 03/01 0700 - 03/02 0659    P.O. 340 75     I.V. (mL/kg) 0 (0) 0 (0)     Blood  212     Other 0 810     IV Piggyback 850 100     .1 83.4     Total Intake(mL/kg) 1893.1 (28.3) 1280.4 (21.7)     Urine (mL/kg/hr) 50 (0) 0 (0)     Emesis/NG output 0 (0) 0 (0)     Other 0 (0) 2810 (2)     Stool 0 (0) 0 (0)     Blood 0 (0) 0 (0)     Total Output 50 2810      Net +1843.1 -1529.6             Urine Occurrence 0 x 0 x     Stool Occurrence 1 x 2 x     Emesis Occurrence 0 x 0 x           Physical Exam   Constitutional: He is oriented to person, place, and time. He appears well-developed. No distress.   Temporal and distal extremity muscle wasting   HENT:   Head: Normocephalic and atraumatic.   Mouth/Throat: No oropharyngeal exudate.   dylan tube   Eyes: EOM are normal. Pupils are equal, round, and reactive to light. Scleral  icterus is present.   Neck: Normal range of motion. Neck supple. No JVD present. No thyromegaly present.   Cardiovascular: Normal rate, regular rhythm, normal heart sounds and intact distal pulses.    No murmur heard.  Pulmonary/Chest: Effort normal. No respiratory distress. He has decreased breath sounds in the right middle field, the right lower field and the left lower field. He has no wheezes. He exhibits no tenderness.   Diminished to RLL   Abdominal: Soft. Bowel sounds are normal. He exhibits ascites. He exhibits no distension. There is tenderness. There is no rebound and no guarding.   Dressing to chevron.  Wd vac removed 1/12/18  Dependent edema present R flank; surrounding erythema   Musculoskeletal: Normal range of motion. He exhibits edema (2+ LE edema). He exhibits no tenderness.   Neurological: He is alert and oriented to person, place, and time. He has normal reflexes.   Skin: Skin is warm and dry. Capillary refill takes 2 to 3 seconds. He is not diaphoretic. No erythema.   jaundice   Psychiatric: He has a normal mood and affect. His behavior is normal. Judgment and thought content normal. His mood appears not anxious.   Depressed mood   Nursing note and vitals reviewed.      Laboratory:  Immunosuppressants         Stop Route Frequency     cycloSPORINE modified (NEORAL) capsule 100 mg      -- Oral 2 times daily        CBC:     Recent Labs  Lab 03/01/18  0600   WBC 3.57*   RBC 2.54*   HGB 7.8*   HCT 24.1*      MCV 95   MCH 30.7   MCHC 32.4     CMP:     Recent Labs  Lab 03/01/18  0600   GLU 81   CALCIUM 8.5*   ALBUMIN 1.6*   PROT 5.1*      K 3.8   CO2 21*      BUN 22*   CREATININE 3.1*   ALKPHOS 105   ALT <5*   AST 28   BILITOT 1.4*     Labs within the past 24 hours have been reviewed.    Diagnostic Results:  pertinent imaging reviewed    Assessment/Plan:     Other neutropenia    - Neupogen given 2/25  -Neupogen given 3/1.          Other cytomegaloviral diseases    - detected at 370 on  CMV PCR 2/1.  - case discussed with ID and will hold off on treatment at this time given low WBC.   - undetected CMV PCR 2/8.  - continue Valcyte 200 mg every Mon, Wed, Fri.  - CMV PCR 2/15 negative  - Per ID, valcyte dose decreased to ppx dose on 2/18.  - CMV PCR 2/22 negative  - Repeat scope tomorrow as pt with leukopenia, h/o CMV, and burning pain despite negative PCR        Fever    - Started on vanc/cefepime for ERCP. Now transitioned to vanc/zosyn.  - Blood cultures sent, prelim NGTD. Repeat blood cultures and chest xray today 2/10.  - thoracentesis 2/1 - fluid negative for infection.  - atelectasis noted on ct scan - start breathing tx & CPT.  - now cmv positive - started treatment 2/5/18.  - taken to OR 2/7 for exp lap - fluid collections drained, cultures pending.  - Patient with low grade fever 2/15, 100.3.  -CT A/P 2/15 with no evidence of IA fluid infection. Did show R pleural effusion s/p thoracentesis, cell count negative for infection, cx pending.  -Repeat blood cx 2/15 prelim NGTD, CMV PCR 2/15 negative   Complaining of abdominal pain. Is tender in RUQ.  -Remains with fevers  - Bilirubin remains elevated. Concerning for cholangitis. Antibiotics broadened 2/17. ID re consulted.   - ERCP  2/19, overall unremarkable findings. Did place new larger stents. Will need repeat ERCP in 10 weeks. Bilirubin is stable post ERCP.  -Tmax 101 2/21 PM again ID following. Plan to transition vanc/zosyn to ertapenem 2/22 as no improvement noted.   - Erta started 2/22.  -CT A/P obtained 2/26 with focal/loculated region of fluid in the lower right abdomen measuring approximately 8.7 x 5.5 cm.   -IR attempted drainage on fluid collection on 2/27, however, per IR,there was limited fluid interposed with bowel in the RLQ with no safe pocket seen for drainage.  -Tagged WBC scan 2/27, negative results.  -Remains with low grade fevers.   -Per transplant attending, will hold off on EGD at this time as concern anesthesia will put  patient at further increase risk for seizure activity. Will plan for potenial EGD on Monday. Discussed with GI, appreciate their assistance.  Chimerism studies obtained today to assess for GVHD. EBV PCR pending. Adenovirus pending. Repeat CMV PCR pending. Monitor.             Constipation    - Enema ordered 1/20 and 1/21 with BM.  - Encourage ambulation.  - decrease narcotics.  - d/c metamucil and miralax bid as makes patient nauseated.  - increase reglan qid.  - small bowel follow through with normal findings.   - patient reports last BM 2/19   -schedule bisacodyl  -KUB 2/21 with significant amounts of stool and gas. Pt declines miralax, metamucil, colace.   -encouraged suppository.  - Improved on KUB 2/23        Other ascites    - Paracentesis performed 1/11/18 with 1600 ml removed.    - fluid negative for infection.   - Repeat para 1/19 negative for infection per cell count.  - no fluid seen for paracentesis on 1/23.        Severe protein-calorie malnutrition    - Poor PO intake since transplant requiring short course of TPN during previous hospital stay.   - albumin remains decreased but appetite slowly improving per pt.   - Dietary consulted. Will need to closely monitor PO intake.   - supplements also ordered.   - SHANE with tube feeding start 1/16. Stopped 1/17/18, was not tolerating. Holloway removed 1/20.  - prealbumin 7 on 1/15.  - TPN started 1/16/18.   - Pt able to eat minimally 1/21 and 1/22 which is an improvement.  - gi consulted - EGD reviewed, increased ppi bid, miralax bid, and metamucil bid, decreased narcotics.  - continue reglan, placed SHANE tube with tube feeds - transitioned to nightly feeds. Not tolerating well due to nausea.   - small bowel follow through with normal findings.   - consulted gi for possible gj tube placement, holding off on this for now as with ongoing fevers.  -Dietary changed tube feeds back to 24 hours from nightly 2/19 as not eating enough during the day to meet caloric needs. TF  on hold since 2/20.  - retrial marinol 2/22 as pt reports that's the only thing helping his appetite, in the evening 2/2 concern for possible drowsiness  - pt feels marinol is helping  - Pt vomited 2/23 after trying hamburger  - Start TPN 2/24- continue  - Pt able to 1/4 sandwich without vomiting 2/25          Nausea and vomiting    - h/o constipation, reports having regular BM's with bowel regimen.  - vomiting episode x 1 at home. Anti-emetics ordered PRN.  - KUB repeated 1/14/18 given increased abdominal pain- mild, generalized bowel distention suggesting ileus.  Diet changed to clears as tolerated for bowel rest .  - SHANE tube placed 1/16 for tube feeds. Not tolerating tube feeds, stopped 1/17/18.  - D/C shane 1/19, now in place as of 1/25.  - chronic constipation, Cont bowel regimen.   - See protein calorie malnutrition.  - TF have been on hold since 2/20 for N/V.   - D/C shane 2/23 as not tolerating TF  - 2/24 changes: Increased Reglan to 10 mg, D/C atovaquone, transitioned cyclo to Prograf, change twyla to BID, check Keppra level  - GI consulted. Plan for EGD beginning of next week as with continued nausea/vomiting with po intake. Appreciate GI assistance.             Recurrent pleural effusion on right    - CXR in ER with large right pleural effusion with subjective c/o worsening SOB  - thoracentesis completed- 1200 ml removed.  SOB with significant improvement.   - Cell count reviewed and negative for infection.   - resp even and non labored.  O2 sat 94-98%.  - Repeat thora 1/19 negative for infection per cell count.  - Reaccumulating fluid on xray 1/21.  - thoracentesis 1/31 - 1.4L removed, negative for infection  - Reaccumulating fluid again on xray 2/10.   -Thoracentesis performed  2/16 with 1500 cc off, cell count neg for infection, cx no growth to date.   -CT A/P 2/26 with large R pleural effusion, may need repeat thora this week.         Biliary stricture of transplanted liver    - tbili with increase  prompting ERCP 1/30 with stones and sludge, stents placed.  -  Repeat ERCP 2/19 as was having recurrent fevers with bilirubin stuck at 2.0-2.2 with overall unremarkable findings seen. Did place new larger stents. Bilirubin now improving. Will need repeat ERCP in 10 weeks.         Anemia of chronic disease    - H/H stable. Cont to monitor with daily cbc.         Delayed surgical wound healing    - wd vac removed 1/12/18.  Wound healing well. Aquacel AG and Mepilex border dressing applied via wound care recs.           Seizure    -Patient with stuttering speech, trouble recalling specific words when speaking. Does have hx of seizure (on keppra) previously while on prograf. Switched to prograf over the weekend from cyclosporine. Neuro exam unremarkable.   -CT head without contrast obtained, unremarkable.   -In light of these symptoms, prograf d/c, restart cyclosporine 3/1.            At risk for opportunistic infections    - CMV detected 2/1 now on tx dose valcyte. CMV PCR 2/15 not detected. CMV PCR 2/22, negative   -Repeat CMV PCR 3/1 pending.   - Pentam given 2/24            Long-term use of immunosuppressant medication    - Maintenance IS with cyclosporine -->transitioned to Prograf 2/24 for continued nausea. However now with neurological symptoms with prograf and hx of seizure, prograf d/c and cyclosporine restarted 3/1.  - MMF on hold for infections and neutropenia.   - Continue to check cyclosporine and Prograf level daily during transition. Assess for toxicity and adjust level as needed.        Prophylactic immunotherapy    - See long term use of immunosuppression.         Liver transplanted    - Post op course complicated by fevers and hyperbilirubinemia.  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  - AST/ALT normal. Tbili/alkphos remain elevated.  - Liver US 1/8 showed 3.9 cm complex fluid collection anterior to right lobe and moderate nonspecific complex ascites inferior to transplant.    - IR placed drain 1/11/18, cell count negative for infection.  - PBS consulted. PBS not comfortable proceeding with EUS with liver biopsy given fever and possible infectious process going on. ERCP 1/17 with stone and sludge. Pt placed on Actigall - treated with 10 day course of abx.  ID now signed off.  - Bili elevated - liver u/s 1/28  - Liver biopsy 1/23 without rejection.  - consulted PBS for ERCP - Choledocholithiasis was found along with sludge, stents placed 1/30.  - ERCP 2/19, overall unremarkable findings. Did place new larger stents. Will need repeat ERCP in 10 weeks. Bilirubin improving        Acute renal failure with tubular necrosis    - HD resumed on previous admission. Now anuric and dialyzes M-W-F.  - Nephrology following.    - ktm consulted for possible kidney transplant.            VTE Risk Mitigation         Ordered     heparin (porcine) injection 1,000 Units  As needed (PRN)     Route:  Intra-Catheter        02/05/18 0945     heparin (porcine) injection 5,000 Units  Every 8 hours     Route:  Subcutaneous        01/31/18 0957     Medium Risk of VTE  Once      01/11/18 0351     Place sequential compression device  Until discontinued      01/11/18 0351          The patients clinical status was discussed at multidisplinary rounds, involving transplant surgery, transplant medicine, pharmacy, nursing, nutrition, and social work    Discharge Planning: Not a candidate for discharge at this time.       Citlali Bridges PA-C  Liver Transplant  [facility]

## 2018-03-01 NOTE — ASSESSMENT & PLAN NOTE
- Started on vanc/cefepime for ERCP. Now transitioned to vanc/zosyn.  - Blood cultures sent, prelim NGTD. Repeat blood cultures and chest xray today 2/10.  - thoracentesis 2/1 - fluid negative for infection.  - atelectasis noted on ct scan - start breathing tx & CPT.  - now cmv positive - started treatment 2/5/18.  - taken to OR 2/7 for exp lap - fluid collections drained, cultures pending.  - Patient with low grade fever 2/15, 100.3.  -CT A/P 2/15 with no evidence of IA fluid infection. Did show R pleural effusion s/p thoracentesis, cell count negative for infection, cx pending.  -Repeat blood cx 2/15 prelim NGTD, CMV PCR 2/15 negative   Complaining of abdominal pain. Is tender in RUQ.  -Remains with fevers  - Bilirubin remains elevated. Concerning for cholangitis. Antibiotics broadened 2/17. ID re consulted.   - ERCP  2/19, overall unremarkable findings. Did place new larger stents. Will need repeat ERCP in 10 weeks. Bilirubin is stable post ERCP.  -Tmax 101 2/21 PM again ID following. Plan to transition vanc/zosyn to ertapenem 2/22 as no improvement noted.   - Erta started 2/22.  -CT A/P obtained 2/26 with focal/loculated region of fluid in the lower right abdomen measuring approximately 8.7 x 5.5 cm.   -IR attempted drainage on fluid collection on 2/27, however, per IR,there was limited fluid interposed with bowel in the RLQ with no safe pocket seen for drainage.  -Tagged WBC scan 2/27, negative results.  -Remains with low grade fevers.   -Per transplant attending, will hold off on EGD at this time as concern anesthesia will put patient at further increase risk for seizure activity. Will plan for potenial EGD on Monday. Discussed with GI, appreciate their assistance.  Chimerism studies obtained today to assess for GVHD. EBV PCR pending. Adenovirus pending. Repeat CMV PCR pending. Monitor.

## 2018-03-01 NOTE — PLAN OF CARE
"Problem: Patient Care Overview  Goal: Plan of Care Review  Outcome: Ongoing (interventions implemented as appropriate)  Patient reports that he is "tired" and has slept most of the day, easily awoken. Mother at bedside, attentive to patient's needs. Bed kept locked, lowest position with 2x side rails up while in bed. nonslip footwear when out of bed. Ambulates with standby assistance and use of walker. Call bell and personal items within reach. NPO this morning for EGD, EGD canceled, regular diet resumed. Patient with inappropriate word use, mother at bedside stated, " he said he wanted to go for a nap with his walker, but meant he wanted to go for a walk". Patient and mother both stated this started yesterday. Went for CT of head, unremarkable. Prograf discontinued, cyclosporine resumed. TPN at 48ml/hr. Left UA midline cdi. Right chest HD cath cdi. Left IJT cdi. Complaints of back pain moderately relieved with prn medication. Abdomen distended, right side abdomen wound followed by wound care. Chimerism labs collected by PA and RN, sent this afternoon. Standard precautions maintained.       "

## 2018-03-01 NOTE — ASSESSMENT & PLAN NOTE
-Patient with stuttering speech, trouble recalling specific words when speaking. Does have hx of seizure (on keppra) previously while on prograf. Switched to prograf over the weekend from cyclosporine. Neuro exam unremarkable.   -CT head without contrast obtained, unremarkable.   -In light of these symptoms, prograf d/c, restart cyclosporine 3/1.

## 2018-03-01 NOTE — ASSESSMENT & PLAN NOTE
- Maintenance IS with cyclosporine -->transitioned to Prograf 2/24 for continued nausea. However now with neurological symptoms with prograf and hx of seizure, prograf d/c and cyclosporine restarted 3/1.  - MMF on hold for infections and neutropenia.   - Continue to check cyclosporine and Prograf level daily during transition. Assess for toxicity and adjust level as needed.

## 2018-03-02 LAB
ALBUMIN SERPL BCP-MCNC: 1.8 G/DL
ALP SERPL-CCNC: 109 U/L
ALT SERPL W/O P-5'-P-CCNC: <5 U/L
AMYLASE SERPL-CCNC: 11 U/L
ANION GAP SERPL CALC-SCNC: 9 MMOL/L
ANISOCYTOSIS BLD QL SMEAR: SLIGHT
AST SERPL-CCNC: 30 U/L
BASOPHILS # BLD AUTO: ABNORMAL K/UL
BASOPHILS NFR BLD: 2 %
BILIRUB SERPL-MCNC: 1.4 MG/DL
BUN SERPL-MCNC: 38 MG/DL
CALCIUM SERPL-MCNC: 9.4 MG/DL
CHLORIDE SERPL-SCNC: 105 MMOL/L
CMV DNA SERPL NAA+PROBE-ACNC: NORMAL IU/ML
CO2 SERPL-SCNC: 22 MMOL/L
CREAT SERPL-MCNC: 4.1 MG/DL
CYCLOSPORINE BLD LC/MS/MS-MCNC: 102 NG/ML
DIFFERENTIAL METHOD: ABNORMAL
EOSINOPHIL # BLD AUTO: ABNORMAL K/UL
EOSINOPHIL NFR BLD: 0 %
ERYTHROCYTE [DISTWIDTH] IN BLOOD BY AUTOMATED COUNT: 18.7 %
EST. GFR  (AFRICAN AMERICAN): 21.4 ML/MIN/1.73 M^2
EST. GFR  (NON AFRICAN AMERICAN): 18.5 ML/MIN/1.73 M^2
GLUCOSE SERPL-MCNC: 89 MG/DL
HCT VFR BLD AUTO: 23.8 %
HGB BLD-MCNC: 7.6 G/DL
IMM GRANULOCYTES # BLD AUTO: ABNORMAL K/UL
IMM GRANULOCYTES NFR BLD AUTO: ABNORMAL %
LIPASE SERPL-CCNC: <3 U/L
LYMPHOCYTES # BLD AUTO: ABNORMAL K/UL
LYMPHOCYTES NFR BLD: 32 %
MAGNESIUM SERPL-MCNC: 2.5 MG/DL
MCH RBC QN AUTO: 30.5 PG
MCHC RBC AUTO-ENTMCNC: 31.9 G/DL
MCV RBC AUTO: 96 FL
METAMYELOCYTES NFR BLD MANUAL: 1 %
MONOCYTES # BLD AUTO: ABNORMAL K/UL
MONOCYTES NFR BLD: 25 %
NEUTROPHILS NFR BLD: 38 %
NEUTS BAND NFR BLD MANUAL: 2 %
NRBC BLD-RTO: 0 /100 WBC
PHOSPHATE SERPL-MCNC: 3.3 MG/DL
PLATELET # BLD AUTO: 159 K/UL
PLATELET BLD QL SMEAR: ABNORMAL
PMV BLD AUTO: 11 FL
POLYCHROMASIA BLD QL SMEAR: ABNORMAL
POTASSIUM SERPL-SCNC: 4 MMOL/L
PROT SERPL-MCNC: 5.1 G/DL
RBC # BLD AUTO: 2.49 M/UL
SODIUM SERPL-SCNC: 136 MMOL/L
TACROLIMUS BLD-MCNC: 3.2 NG/ML
WBC # BLD AUTO: 4.27 K/UL

## 2018-03-02 PROCEDURE — 90935 HEMODIALYSIS ONE EVALUATION: CPT

## 2018-03-02 PROCEDURE — 99233 SBSQ HOSP IP/OBS HIGH 50: CPT | Mod: ,,, | Performed by: PHYSICIAN ASSISTANT

## 2018-03-02 PROCEDURE — 83735 ASSAY OF MAGNESIUM: CPT

## 2018-03-02 PROCEDURE — 94664 DEMO&/EVAL PT USE INHALER: CPT

## 2018-03-02 PROCEDURE — 80197 ASSAY OF TACROLIMUS: CPT

## 2018-03-02 PROCEDURE — 90935 HEMODIALYSIS ONE EVALUATION: CPT | Mod: ,,, | Performed by: INTERNAL MEDICINE

## 2018-03-02 PROCEDURE — 25000003 PHARM REV CODE 250: Performed by: PHYSICIAN ASSISTANT

## 2018-03-02 PROCEDURE — 63600175 PHARM REV CODE 636 W HCPCS: Performed by: PHYSICIAN ASSISTANT

## 2018-03-02 PROCEDURE — 84100 ASSAY OF PHOSPHORUS: CPT

## 2018-03-02 PROCEDURE — 85027 COMPLETE CBC AUTOMATED: CPT

## 2018-03-02 PROCEDURE — 85007 BL SMEAR W/DIFF WBC COUNT: CPT

## 2018-03-02 PROCEDURE — 82150 ASSAY OF AMYLASE: CPT

## 2018-03-02 PROCEDURE — 80053 COMPREHEN METABOLIC PANEL: CPT

## 2018-03-02 PROCEDURE — 63600175 PHARM REV CODE 636 W HCPCS: Performed by: NURSE PRACTITIONER

## 2018-03-02 PROCEDURE — 80158 DRUG ASSAY CYCLOSPORINE: CPT

## 2018-03-02 PROCEDURE — 94761 N-INVAS EAR/PLS OXIMETRY MLT: CPT

## 2018-03-02 PROCEDURE — B4185 PARENTERAL SOL 10 GM LIPIDS: HCPCS | Performed by: PHYSICIAN ASSISTANT

## 2018-03-02 PROCEDURE — 83690 ASSAY OF LIPASE: CPT

## 2018-03-02 PROCEDURE — 94799 UNLISTED PULMONARY SVC/PX: CPT

## 2018-03-02 PROCEDURE — 20600001 HC STEP DOWN PRIVATE ROOM

## 2018-03-02 PROCEDURE — A4217 STERILE WATER/SALINE, 500 ML: HCPCS | Performed by: PHYSICIAN ASSISTANT

## 2018-03-02 PROCEDURE — 99900035 HC TECH TIME PER 15 MIN (STAT)

## 2018-03-02 PROCEDURE — 63600175 PHARM REV CODE 636 W HCPCS: Mod: JG | Performed by: SURGERY

## 2018-03-02 PROCEDURE — 25000003 PHARM REV CODE 250: Performed by: INTERNAL MEDICINE

## 2018-03-02 PROCEDURE — 25000003 PHARM REV CODE 250: Performed by: SURGERY

## 2018-03-02 PROCEDURE — 36415 COLL VENOUS BLD VENIPUNCTURE: CPT

## 2018-03-02 PROCEDURE — 25000003 PHARM REV CODE 250: Performed by: NURSE PRACTITIONER

## 2018-03-02 RX ORDER — FENTANYL CITRATE 50 UG/ML
12.5 INJECTION, SOLUTION INTRAMUSCULAR; INTRAVENOUS ONCE
Status: COMPLETED | OUTPATIENT
Start: 2018-03-02 | End: 2018-03-02

## 2018-03-02 RX ORDER — SYRING-NEEDL,DISP,INSUL,0.3 ML 29 G X1/2"
300 SYRINGE, EMPTY DISPOSABLE MISCELLANEOUS ONCE
Status: COMPLETED | OUTPATIENT
Start: 2018-03-02 | End: 2018-03-02

## 2018-03-02 RX ORDER — PSEUDOEPHEDRINE/ACETAMINOPHEN 30MG-500MG
100 TABLET ORAL ONCE
Status: COMPLETED | OUTPATIENT
Start: 2018-03-02 | End: 2018-03-02

## 2018-03-02 RX ADMIN — LEVOTHYROXINE SODIUM 75 MCG: 75 TABLET ORAL at 06:03

## 2018-03-02 RX ADMIN — METOCLOPRAMIDE HYDROCHLORIDE 10 MG: 5 SOLUTION ORAL at 05:03

## 2018-03-02 RX ADMIN — HEPARIN SODIUM 1000 UNITS: 1000 INJECTION, SOLUTION INTRAVENOUS; SUBCUTANEOUS at 11:03

## 2018-03-02 RX ADMIN — SODIUM CHLORIDE 500 ML: 0.9 INJECTION, SOLUTION INTRAVENOUS at 05:03

## 2018-03-02 RX ADMIN — LEVETIRACETAM 500 MG: 100 SOLUTION ORAL at 10:03

## 2018-03-02 RX ADMIN — ONDANSETRON HYDROCHLORIDE 4 MG: 2 INJECTION, SOLUTION INTRAMUSCULAR; INTRAVENOUS at 06:03

## 2018-03-02 RX ADMIN — METOCLOPRAMIDE HYDROCHLORIDE 10 MG: 5 SOLUTION ORAL at 06:03

## 2018-03-02 RX ADMIN — DRONABINOL 2.5 MG: 2.5 CAPSULE ORAL at 05:03

## 2018-03-02 RX ADMIN — OXYCODONE HYDROCHLORIDE 10 MG: 5 TABLET ORAL at 05:03

## 2018-03-02 RX ADMIN — SODIUM CHLORIDE: 9 INJECTION, SOLUTION INTRAVENOUS at 01:03

## 2018-03-02 RX ADMIN — SODIUM CHLORIDE: 234 INJECTION INTRAMUSCULAR; INTRAVENOUS; SUBCUTANEOUS at 10:03

## 2018-03-02 RX ADMIN — OMEPRAZOLE 40 MG: 40 CAPSULE, DELAYED RELEASE ORAL at 07:03

## 2018-03-02 RX ADMIN — URSODIOL 300 MG: 300 CAPSULE ORAL at 06:03

## 2018-03-02 RX ADMIN — FENTANYL CITRATE 12.5 MCG: 50 INJECTION INTRAMUSCULAR; INTRAVENOUS at 02:03

## 2018-03-02 RX ADMIN — MAGNESIUM CITRATE 300 ML: 1.75 LIQUID ORAL at 05:03

## 2018-03-02 RX ADMIN — OXYCODONE HYDROCHLORIDE 10 MG: 5 TABLET ORAL at 11:03

## 2018-03-02 RX ADMIN — SODIUM CHLORIDE: 0.9 INJECTION, SOLUTION INTRAVENOUS at 07:03

## 2018-03-02 RX ADMIN — SODIUM CHLORIDE 0.5 G: 0.9 INJECTION, SOLUTION INTRAVENOUS at 09:03

## 2018-03-02 RX ADMIN — LEVETIRACETAM 500 MG: 100 SOLUTION ORAL at 12:03

## 2018-03-02 RX ADMIN — SOYBEAN OIL 250 ML: 20 INJECTION, SOLUTION INTRAVENOUS at 09:03

## 2018-03-02 RX ADMIN — METOCLOPRAMIDE HYDROCHLORIDE 10 MG: 5 SOLUTION ORAL at 09:03

## 2018-03-02 RX ADMIN — CYCLOSPORINE 100 MG: 100 CAPSULE, LIQUID FILLED ORAL at 06:03

## 2018-03-02 RX ADMIN — ERYTHROPOIETIN 10000 UNITS: 10000 INJECTION, SOLUTION INTRAVENOUS; SUBCUTANEOUS at 11:03

## 2018-03-02 RX ADMIN — CYCLOSPORINE 125 MG: 100 CAPSULE, LIQUID FILLED ORAL at 05:03

## 2018-03-02 RX ADMIN — OXYCODONE HYDROCHLORIDE 10 MG: 5 TABLET ORAL at 04:03

## 2018-03-02 RX ADMIN — Medication 100 ML: at 05:03

## 2018-03-02 RX ADMIN — URSODIOL 300 MG: 300 CAPSULE ORAL at 09:03

## 2018-03-02 RX ADMIN — METOCLOPRAMIDE HYDROCHLORIDE 10 MG: 5 SOLUTION ORAL at 12:03

## 2018-03-02 RX ADMIN — OXYCODONE HYDROCHLORIDE 10 MG: 5 TABLET ORAL at 09:03

## 2018-03-02 NOTE — PLAN OF CARE
"Problem: Patient Care Overview  Goal: Plan of Care Review  Outcome: Ongoing (interventions implemented as appropriate)  Patient reports that he is "tired" and has slept most of the day, easily awoken. Mother at bedside, attentive to patient's needs. Bed kept locked, lowest position with 2x side rails up while in bed. nonslip footwear when out of bed. Ambulates with standby assistance and use of walker. Call bell and personal items within reach.  regular diet; poor appetite, eating encouraged. Patient with inappropriate word use, mother stated started 2 days ago. Prograf discontinued, cyclosporine resumed. TPN at 48ml/hr. Left UA midline cdi. Right chest HD cath cdi. Left IJT cdi. Complaints of back pain moderately relieved with prn medication. Abdomen distended, right side abdomen wound followed by wound care.  Abdominal xray and liver ultra sound this afternoon.  Standard precautions maintained.       "

## 2018-03-02 NOTE — PROGRESS NOTES
Dialysis complete.  Blood returned.    Right IJ  CVC flushed * 2 with       Locked with cap and tape.  No s/s infection at cvc site.   Pt tolerated hemodialysis without diff.  Pt ran 3.5   Hrs on hemodialysis machine.   Took off  2000 Ml net volume.      Used F-160 dialyzer.

## 2018-03-02 NOTE — ASSESSMENT & PLAN NOTE
- Paracentesis performed 1/11/18 with 1600 ml removed.    - fluid negative for infection.   - Repeat para 1/19 negative for infection per cell count.  - no fluid seen for paracentesis on 1/23.  -Will ask IR to re attempt paracentesis next week to assess for peritonitis.

## 2018-03-02 NOTE — ASSESSMENT & PLAN NOTE
- CXR in ER with large right pleural effusion with subjective c/o worsening SOB  - thoracentesis completed- 1200 ml removed.  SOB with significant improvement.   - Cell count reviewed and negative for infection.   - resp even and non labored.  O2 sat 94-98%.  - Repeat thora 1/19 negative for infection per cell count.  - Reaccumulating fluid on xray 1/21.  - thoracentesis 1/31 - 1.4L removed, negative for infection  - Reaccumulating fluid again on xray 2/10.   -Thoracentesis performed  2/16 with 1500 cc off, cell count neg for infection, cx no growth to date.   -CT A/P 2/26 with large R pleural effusion, may need repeat thora if fevers reoccur.

## 2018-03-02 NOTE — PROGRESS NOTES
SW presented to the pt who was not present in the pts room at this time. EZEKIEL was presenting on behalf a post txp pt who hand made cards for liver pts.  EZEKIEL provided the patient with the card from the patient and left on his dinner tray. EZEKIEL remains available.

## 2018-03-02 NOTE — PROGRESS NOTES
[facility]  Liver Transplant  Progress Note    Patient Name: Jhonny Diana  MRN: [id number]  Admission Date: 2018  Hospital Length of Stay: 50 days  Code Status: Full Code  Primary Care Provider: Primary Doctor No  Post-Operative Day: 134    ORGAN:   LIVER  Disease Etiology: Acute Alcoholic Hepatitis  Donor Type:    - Brain Death  CDC High Risk:   No  Donor CMV Status:   Donor CMV Status: Positive  Donor HBcAB:   Negative  Donor HCV Status:   Negative  Whole or Partial: Whole Liver  Biliary Anastomosis: End to End  Arterial Anatomy: Standard  Subjective:     History of Present Illness:  Jhonny Diana is a 28 y/o male with past medical history of alcoholic cirrhosis.  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 Found to have peritonitis in November (WBC 5000, 75% PNM) neg for bile leak. He was treated initially with vanc/cefepime. Repeat cell counts  with some improvement (WBC 1400, 45% PNM). He has undergone multiple paracenteses as well as abscess drainage of perihepatic fluid collections and treated with antimicrobial therapy but no positive cultures. Of note, biliary stricture also identified and ERCP performed on 2017 with sphincterotomy and biliary stent placed. Liver tests still have not normalized despite intervention, bilirubin and AP remain elevated. Other pertinent PMH current wound vac in place 2/2 wound infection, malnutrition requiring TPN for short course and ongoing hypoalbuminemia, and seizure activity while on prograf and has since been switched to cyclosporine without reoccurrence.  He presented to the ER for fever, abdominal pain, and N/V. He reports fever (103) for 1  day prior. Overnight, he developed N/V, reports small amount of green emesis with new left sided pain. He also endorses worsening SOB with exertion. He was scheduled as an outpatient for follow up paracentesis and IR drainage of fluid collection. CXR in ER shows large pleural effusion with subsegmental atelectasis. Infectious work up initiated in ER. His ANC is 900. Broad spectrum antibiotics initiated in ED. He denies chest pain, palpitations, diarrhea, constipation, or back pain. Denies any sick contacts.    Hospital Course:  Thoracentesis (1.2L off), Paracentesis (1.6L off), and IR drainage of fluid collection 1/11, all fluids negative for infection. ID consulted. Broad spectrum antibiotics d/c'd 1/15. Chronically malnourished with poor PO intake, prealbumin 7. Shane tube placed 1/15 for tube feedings.     ERCP 1/17 with sludge and a biliary stone which was removed and stent exchanged.  Remained with n/v; therefore, TF remained on hold and TPN continued.   Pt with fever s/p ERCP on 1/17 which continued until 1/19.  Vanc/cefepime restarted.  Blood cx 1/17 and 1/18 NGTD.  ID reconsulted.  Fungal markers sent.  CT C/A/P obtained.  With large R pleural effusion and ascites- both drained 1/19 and negative for infx per cell count. Pt afebrile 1/20. Liver biopsy 1/23 - without rejection.  EGD 1/25 - unremarkable for source of GI symptoms.  Resume diet along with other GI recommendations. SHANE tube placed for tube feeds.       2/22: Pt again spiked temp of 101 overnight, now down to 99. Resent Bcx overnight, prelim NGTD. Transitioned to Erta from vanc/zosyn per ID. Planning for tagged WBC scan on Monday. R sided abdominal erythema/dependent edema improving today.  TF have been on hold since 2/20. Will continue to hold today per pt request. Pt working on diet. KUB yesterday with significant stool and gas. Declines colace, miralax, metamucil, but willing to take daily bisacodyl. Pt agreeable to suppository today. If  suppository not successful can give mag citrate through dylan tonight. Will also retrial Marinol tonight as pt reported improvement in appetite previously. Will monitor closely for sedation.     2/23:  2 small BM 2/22.  Tolerated HD.  Rumford removed.  Will attempt to eat this PM.      2/24:  Pt ate then vomited hamburger 2/23.  Will resume TPN.  D/C atovaquone, transition cyclo to Prograf, check Keppra level, increase Reglan to 0 mg, change twyla to BID for nausea.  Will give pentam today.    2/25:  TPN going well, pt ate 1/2 subway sandwhich without vomiting yesterday, will continue to monitor.  Plan to repeat EGD this week.  Pt with drop in H/h again and appears to be hemolyzing.  LDH elevated, hapto slightly low, LEE pending.  With neutropenia- Neupogen given.    2/26:  Pt with severe back pain s/p HD. Unclear if secondary to neupogen.  Repeat labs stable. Indium scan scheduled for 2/27- injection done today (back pain started prior). CT A/P obtained with fluid collection seen in pelvis.    2/27: Patient spiking fevers, 101. Indium scan, negative results. CT A/P obtained 2/26 with focal/loculated region of fluid in the lower right abdomen measuring approximately 8.7 x 5.5 cm. IR attempted drainage, however, per IR,there was  limited fluid interposed with bowel in the RLQ with no safe pocket seen for drainage.    2/28: Remains with low grade fevers. Neupogen given.     3/1: Patient with stuttering speech, trouble recalling specific words when speaking. Does have hx of seizure (on keppra) previously while on prograf. Switched to prograf 2/24 from cyclosporine. Neuro exam unremarkable. CT head without contrast obtained, unremarkable. In light of these symptoms, prograf d/c, cyclosporine restarted 3/1. Per transplant attending, held off on EGD at this time as concern anesthesia would put patient at further increase risk for seizure activity.     3/2: No acute events overnight. Patient not feeling well today after dialysis.  Complaining of recurrence of back pain. Pain is all down is spine. Also with pain in his abdomen. Passing flatus. Had one episode of diarrhea last night. Will obtain amylase, lipase, KUB, along with Liver US. CT A/P from 2/26 with large amount of stool. Pending KUB, patient likely to need enema this afternoon. Tmax 99.2. ID following. Potential plan for EGD on Monday to assess source of fevers/nausea/abdominal pain. Will also speak with IR on Monday about repeat paracentesis next week to rule out ongoing peritonitis as source of abdominal pain/recurrent fevers. Cont Ertapenem x 2 weeks. Cont to monitor.     Scheduled Meds:   bisacodyl  10 mg Oral Daily    cycloSPORINE modified  125 mg Oral BID    docusate sodium  100 mg Oral BID    dronabinol  2.5 mg Oral Daily    epoetin maurisio (PROCRIT) injection  10,000 Units Intravenous Every Mon, Wed, Fri    ertapenem (INVANZ) IVPB  500 mg Intravenous Q24H    fat emulsion 20%  250 mL Intravenous Daily    heparin (porcine)  5,000 Units Subcutaneous Q8H    custom IVPB builder   Intravenous Q24H    levetiracetam oral soln  500 mg Oral BID    levothyroxine  75 mcg Oral Before breakfast    metoclopramide HCl  10 mg Oral QID (AC & HS)    multivitamin  1 tablet Oral Daily    omeprazole  40 mg Oral QAM    polyethylene glycol  17 g Oral BID    psyllium husk (aspartame)  3.4 g Oral BID    ursodiol  300 mg Oral BID     Continuous Infusions:   TPN ADULT CENTRAL LINE CUSTOM 48 mL/hr at 03/01/18 2125    TPN ADULT CENTRAL LINE CUSTOM       PRN Meds:sodium chloride, sodium chloride 0.9%, acetaminophen, albuterol-ipratropium 2.5mg-0.5mg/3mL, bisacodyl, dextrose 50%, dextrose 50%, glucagon (human recombinant), glucose, glucose, heparin (porcine), naloxone, ondansetron, ondansetron, oxyCODONE, prochlorperazine, simethicone, sodium chloride 0.9%    Review of Systems   Constitutional: Positive for activity change, appetite change, fatigue and fever. Negative for chills.   HENT:  Negative.  Negative for congestion and facial swelling.    Eyes: Negative for pain, discharge and visual disturbance.   Respiratory: Negative for cough, chest tightness, shortness of breath and wheezing.    Cardiovascular: Negative for chest pain, palpitations and leg swelling.   Gastrointestinal: Positive for abdominal distention, abdominal pain and nausea. Negative for constipation.   Endocrine: Negative.    Genitourinary: Positive for decreased urine volume. Negative for difficulty urinating.        Anuric   Musculoskeletal: Positive for back pain. Negative for arthralgias and myalgias.   Skin: Positive for color change and wound. Negative for rash.   Allergic/Immunologic: Positive for immunocompromised state.   Neurological: Positive for weakness. Negative for dizziness, seizures and headaches.   Psychiatric/Behavioral: Positive for decreased concentration and dysphoric mood. Negative for confusion. The patient is not nervous/anxious.    All other systems reviewed and are negative.    Objective:     Vital Signs (Most Recent):  Temp: 98.8 °F (37.1 °C) (03/02/18 1200)  Pulse: 85 (03/02/18 1200)  Resp: 14 (03/02/18 1245)  BP: 134/77 (03/02/18 1200)  SpO2: 95 % (03/02/18 1245) Vital Signs (24h Range):  Temp:  [98.4 °F (36.9 °C)-99.2 °F (37.3 °C)] 98.8 °F (37.1 °C)  Pulse:  [72-94] 85  Resp:  [14-20] 14  SpO2:  [94 %-97 %] 95 %  BP: (123-137)/(73-88) 134/77     Weight: 57.5 kg (126 lb 12.2 oz)  Body mass index is 20.46 kg/m².    Intake/Output - Last 3 Shifts       02/28 0700 - 03/01 0659 03/01 0700 - 03/02 0659 03/02 0700 - 03/03 0659    P.O. 75 560     I.V. (mL/kg) 0 (0)      Blood 212      Other 810      IV Piggyback 100 100     TPN 83.4 1537.3     Total Intake(mL/kg) 1280.4 (21.7) 2197.3 (38.2)     Urine (mL/kg/hr) 0 (0)      Emesis/NG output 0 (0)      Other 2810 (2)      Stool 0 (0)      Blood 0 (0)      Total Output 2810        Net -1529.6 +2197.3             Urine Occurrence 0 x 0 x     Stool Occurrence 2 x 5  x     Emesis Occurrence 0 x            Physical Exam   Constitutional: He is oriented to person, place, and time. He appears well-developed. No distress.   Temporal and distal extremity muscle wasting   HENT:   Head: Normocephalic and atraumatic.   Mouth/Throat: No oropharyngeal exudate.   dylan tube   Eyes: EOM are normal. Pupils are equal, round, and reactive to light. Scleral icterus is present.   Neck: Normal range of motion. Neck supple. No JVD present. No thyromegaly present.   Cardiovascular: Normal rate, regular rhythm, normal heart sounds and intact distal pulses.    No murmur heard.  Pulmonary/Chest: Effort normal. No respiratory distress. He has decreased breath sounds in the right middle field, the right lower field and the left lower field. He has no wheezes. He exhibits no tenderness.   Diminished to RLL   Abdominal: Soft. Bowel sounds are normal. He exhibits distension and ascites. There is tenderness. There is no rebound and no guarding.   Dressing to chevron.  Wd vac removed 1/12/18  Dependent edema present R flank; surrounding erythema   Musculoskeletal: Normal range of motion. He exhibits edema (2+ LE edema). He exhibits no tenderness.   Neurological: He is alert and oriented to person, place, and time. He has normal reflexes.   Skin: Skin is warm and dry. Capillary refill takes 2 to 3 seconds. He is not diaphoretic. No erythema.   jaundice   Psychiatric: He has a normal mood and affect. His behavior is normal. Judgment and thought content normal. His mood appears not anxious.   Depressed mood   Nursing note and vitals reviewed.      Laboratory:  Immunosuppressants         Stop Route Frequency     cycloSPORINE modified capsule 125 mg      -- Oral 2 times daily        CBC:     Recent Labs  Lab 03/02/18  0530   WBC 4.27   RBC 2.49*   HGB 7.6*   HCT 23.8*      MCV 96   MCH 30.5   MCHC 31.9*     CMP:     Recent Labs  Lab 03/02/18  0530   GLU 89   CALCIUM 9.4   ALBUMIN 1.8*   PROT 5.1*       K 4.0   CO2 22*      BUN 38*   CREATININE 4.1*   ALKPHOS 109   ALT <5*   AST 30   BILITOT 1.4*     Labs within the past 24 hours have been reviewed.    Diagnostic Results:  pertinent imaging reviewed    Assessment/Plan:     Other neutropenia    - Neupogen given 2/25  -Neupogen given 3/1.          Other cytomegaloviral diseases    - detected at 370 on CMV PCR 2/1.  - case discussed with ID and will hold off on treatment at this time given low WBC.   - undetected CMV PCR 2/8.  - continue Valcyte 200 mg every Mon, Wed, Fri.  - CMV PCR 2/15 negative  - Per ID, valcyte dose decreased to ppx dose on 2/18.  - CMV PCR 2/22 negative  - Repeat scope possibly Monday as pt with leukopenia, h/o CMV, and burning pain despite negative PCR        Fever    - Started on vanc/cefepime for ERCP. Now transitioned to vanc/zosyn.  - Blood cultures sent, prelim NGTD. Repeat blood cultures and chest xray today 2/10.  - thoracentesis 2/1 - fluid negative for infection.  - atelectasis noted on ct scan - start breathing tx & CPT.  - now cmv positive - started treatment 2/5/18.  - taken to OR 2/7 for exp lap - fluid collections drained, cultures pending.  - Patient with low grade fever 2/15, 100.3.  -CT A/P 2/15 with no evidence of IA fluid infection. Did show R pleural effusion s/p thoracentesis, cell count negative for infection, cx pending.  -Repeat blood cx 2/15 prelim NGTD, CMV PCR 2/15 negative   Complaining of abdominal pain. Is tender in RUQ.  -Remains with fevers  - Bilirubin remains elevated. Concerning for cholangitis. Antibiotics broadened 2/17. ID re consulted.   - ERCP  2/19, overall unremarkable findings. Did place new larger stents. Will need repeat ERCP in 10 weeks. Bilirubin is stable post ERCP.  -Tmax 101 2/21 PM again ID following. Plan to transition vanc/zosyn to ertapenem 2/22 as no improvement noted.   - Erta started 2/22.  -CT A/P obtained 2/26 with focal/loculated region of fluid in the lower right abdomen  measuring approximately 8.7 x 5.5 cm.   -IR attempted drainage on fluid collection on 2/27, however, per IR,there was limited fluid interposed with bowel in the RLQ with no safe pocket seen for drainage.  -Tagged WBC scan 2/27, negative results.  -Remains with low grade fevers.   -Per transplant attending, will hold off on EGD at this time as concern anesthesia will put patient at further increase risk for seizure activity. Will plan for potenial EGD on Monday. Discussed with GI, appreciate their assistance.  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 EBV PCR negative. Adenovirus pending. Repeat CMV PCR pending.   -Back pain today along with worsening abdominal pain. Will obtain amylase, lipase, KUB, along with Liver US. CT A/P from 2/26 with large amount of stool. Pending KUB, patient likely to need enema this afternoon. Tmax 99.2. ID following. Will also speak with IR on Monday about repeat paracentesis next week to rule out ongoing peritonitis as source of abdominal pain/recurrent fevers. Cont Ertapenem x 2 weeks.           Constipation    - Enema ordered 1/20 and 1/21 with BM.  - Encourage ambulation.  - decrease narcotics.  - d/c metamucil and miralax bid as makes patient nauseated.  - increase reglan qid.  - small bowel follow through with normal findings.   - patient reports last BM 2/19   -schedule bisacodyl  -KUB 2/21 with significant amounts of stool and gas. Pt declines miralax, metamucil, colace.   -encouraged suppository.  - Improved on KUB 2/23  -Repeat KUB ordered today 3/2, awaiting results.         Other ascites    - Paracentesis performed 1/11/18 with 1600 ml removed.    - fluid negative for infection.   - Repeat para 1/19 negative for infection per cell count.  - no fluid seen for paracentesis on 1/23.  -Will ask IR to re attempt paracentesis next week to assess for peritonitis.         Severe protein-calorie malnutrition    - Poor PO intake since transplant requiring short course of TPN  during previous hospital stay.   - albumin remains decreased but appetite slowly improving per pt.   - Dietary consulted. Will need to closely monitor PO intake.   - supplements also ordered.   - SHANE with tube feeding start 1/16. Stopped 1/17/18, was not tolerating. Shane removed 1/20.  - prealbumin 7 on 1/15.  - TPN started 1/16/18.   - Pt able to eat minimally 1/21 and 1/22 which is an improvement.  - gi consulted - EGD reviewed, increased ppi bid, miralax bid, and metamucil bid, decreased narcotics.  - continue reglan, placed SHANE tube with tube feeds - transitioned to nightly feeds. Not tolerating well due to nausea.   - small bowel follow through with normal findings.   - consulted gi for possible gj tube placement, holding off on this for now as with ongoing fevers.  -Dietary changed tube feeds back to 24 hours from nightly 2/19 as not eating enough during the day to meet caloric needs. TF on hold since 2/20.  - retrial marinol 2/22 as pt reports that's the only thing helping his appetite, in the evening 2/2 concern for possible drowsiness  - pt feels marinol is helping  - Pt vomited 2/23 after trying hamburger  - Start TPN 2/24- continue  - Pt able to 1/4 sandwich without vomiting 2/25          Nausea and vomiting    - h/o constipation, reports having regular BM's with bowel regimen.  - vomiting episode x 1 at home. Anti-emetics ordered PRN.  - KUB repeated 1/14/18 given increased abdominal pain- mild, generalized bowel distention suggesting ileus.  Diet changed to clears as tolerated for bowel rest .  - SHANE tube placed 1/16 for tube feeds. Not tolerating tube feeds, stopped 1/17/18.  - D/C shane 1/19, now in place as of 1/25.  - chronic constipation, Cont bowel regimen.   - See protein calorie malnutrition.  - TF have been on hold since 2/20 for N/V.   - D/C shane 2/23 as not tolerating TF  - 2/24 changes: Increased Reglan to 10 mg, D/C atovaquone, transitioned cyclo to Prograf, change twyla to BID, check Keppra  level  - GI consulted. Plan for EGD beginning of next week as with continued nausea/vomiting with po intake. Appreciate GI assistance.             Recurrent pleural effusion on right    - CXR in ER with large right pleural effusion with subjective c/o worsening SOB  - thoracentesis completed- 1200 ml removed.  SOB with significant improvement.   - Cell count reviewed and negative for infection.   - resp even and non labored.  O2 sat 94-98%.  - Repeat thora 1/19 negative for infection per cell count.  - Reaccumulating fluid on xray 1/21.  - thoracentesis 1/31 - 1.4L removed, negative for infection  - Reaccumulating fluid again on xray 2/10.   -Thoracentesis performed  2/16 with 1500 cc off, cell count neg for infection, cx no growth to date.   -CT A/P 2/26 with large R pleural effusion, may need repeat thora if fevers reoccur.          Biliary stricture of transplanted liver    - tbili with increase prompting ERCP 1/30 with stones and sludge, stents placed.  -  Repeat ERCP 2/19 as was having recurrent fevers with bilirubin stuck at 2.0-2.2 with overall unremarkable findings seen. Did place new larger stents. Bilirubin now improving. Will need repeat ERCP in 10 weeks.         Anemia of chronic disease    - H/H stable. Cont to monitor with daily cbc.         Delayed surgical wound healing    - wd vac removed 1/12/18.  Wound healing well. Aquacel AG and Mepilex border dressing applied via wound care recs.           Seizure    -Patient with stuttering speech, trouble recalling specific words when speaking. Does have hx of seizure (on keppra) previously while on prograf. Switched to prograf over the weekend from cyclosporine. Neuro exam unremarkable.   -CT head without contrast obtained, unremarkable.   -In light of these symptoms, prograf d/c, restart cyclosporine 3/1.            At risk for opportunistic infections    - CMV detected 2/1 now on tx dose valcyte. CMV PCR 2/15 not detected. CMV PCR 2/22, negative   -Repeat  CMV PCR 3/1 pending.   - Pentam given 2/24            Long-term use of immunosuppressant medication    - Maintenance IS with cyclosporine -->transitioned to Prograf 2/24 for continued nausea. However now with neurological symptoms with prograf and hx of seizure, prograf d/c and cyclosporine restarted 3/1.  - MMF on hold for infections and neutropenia.   - Continue to check cyclosporine and Prograf level daily during transition. Assess for toxicity and adjust level as needed.        Prophylactic immunotherapy    - See long term use of immunosuppression.         Liver transplanted    - Post op course complicated by fevers and hyperbilirubinemia.  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  - Liver US 1/8 showed 3.9 cm complex fluid collection anterior to right lobe and moderate nonspecific complex ascites inferior to transplant.   - IR placed drain 1/11/18, cell count negative for infection.  - PBS consulted. ERCP 1/17 with stone and sludge. Pt placed on Actigall - treated with 10 day course of abx.   - Bili elevated - liver u/s 1/28  - Liver biopsy 1/23 without rejection.  - consulted PBS for ERCP - Choledocholithiasis was found along with sludge, stents placed 1/30.  - ERCP 2/19, overall unremarkable findings. Did place new larger stents. Will need repeat ERCP in 10 weeks. Bilirubin improving.  -Plan for repeat Liver US today as with continued abdominal pain.         Acute renal failure with tubular necrosis    - HD resumed on previous admission. Now anuric and dialyzes M-W-F.  - Nephrology following.    - ktm consulted for possible kidney transplant.            VTE Risk Mitigation         Ordered     heparin (porcine) injection 1,000 Units  As needed (PRN)     Route:  Intra-Catheter        02/05/18 0945     heparin (porcine) injection 5,000 Units  Every 8 hours     Route:  Subcutaneous        01/31/18 0957     Medium Risk of VTE  Once      01/11/18 0351     Place sequential compression device   Until discontinued      01/11/18 0351          The patients clinical status was discussed at multidisplinary rounds, involving transplant surgery, transplant medicine, pharmacy, nursing, nutrition, and social work    Discharge Planning: Not a candidate for discharge at this time.       Citlali Bridges PA-C  Liver Transplant  [facility]

## 2018-03-02 NOTE — PROGRESS NOTES
Update:    Late Entry- This encounter occurred on 3/1/2018    SW presented to pt's room for continuity of care and to assess for psychosocial needs. Pt presented as oriented x4, somewhat sleepy, and minimally engaged. Pt's mother presented as alert, oriented x4, engaged, cooperative, and pleasant. Pt's mother reported coping adequately with aid of family and friends but she was appropriately tearful at times. Pt's mother processed some of the stressors she has felt with pt still being in the hospital. SW provided reflective listening and emotional support. Pt reported coping adequately and hopeful that he starts getting better enough to get out of the hospital. SW provided opportunity for pt and mother to voice questions/concerns.  Pt and pt's mother denied any needs at this time. SW remains available for psychosocial, resource, educational, and discharge needs as appropriate.

## 2018-03-02 NOTE — SUBJECTIVE & OBJECTIVE
Scheduled Meds:   bisacodyl  10 mg Oral Daily    cycloSPORINE modified  125 mg Oral BID    docusate sodium  100 mg Oral BID    dronabinol  2.5 mg Oral Daily    epoetin maurisio (PROCRIT) injection  10,000 Units Intravenous Every Mon, Wed, Fri    ertapenem (INVANZ) IVPB  500 mg Intravenous Q24H    fat emulsion 20%  250 mL Intravenous Daily    heparin (porcine)  5,000 Units Subcutaneous Q8H    custom IVPB builder   Intravenous Q24H    levetiracetam oral soln  500 mg Oral BID    levothyroxine  75 mcg Oral Before breakfast    metoclopramide HCl  10 mg Oral QID (AC & HS)    multivitamin  1 tablet Oral Daily    omeprazole  40 mg Oral QAM    polyethylene glycol  17 g Oral BID    psyllium husk (aspartame)  3.4 g Oral BID    ursodiol  300 mg Oral BID     Continuous Infusions:   TPN ADULT CENTRAL LINE CUSTOM 48 mL/hr at 03/01/18 2125    TPN ADULT CENTRAL LINE CUSTOM       PRN Meds:sodium chloride, sodium chloride 0.9%, acetaminophen, albuterol-ipratropium 2.5mg-0.5mg/3mL, bisacodyl, dextrose 50%, dextrose 50%, glucagon (human recombinant), glucose, glucose, heparin (porcine), naloxone, ondansetron, ondansetron, oxyCODONE, prochlorperazine, simethicone, sodium chloride 0.9%    Review of Systems   Constitutional: Positive for activity change, appetite change, fatigue and fever. Negative for chills.   HENT: Negative.  Negative for congestion and facial swelling.    Eyes: Negative for pain, discharge and visual disturbance.   Respiratory: Negative for cough, chest tightness, shortness of breath and wheezing.    Cardiovascular: Negative for chest pain, palpitations and leg swelling.   Gastrointestinal: Positive for abdominal distention, abdominal pain and nausea. Negative for constipation.   Endocrine: Negative.    Genitourinary: Positive for decreased urine volume. Negative for difficulty urinating.        Anuric   Musculoskeletal: Positive for back pain. Negative for arthralgias and myalgias.   Skin: Positive for  color change and wound. Negative for rash.   Allergic/Immunologic: Positive for immunocompromised state.   Neurological: Positive for weakness. Negative for dizziness, seizures and headaches.   Psychiatric/Behavioral: Positive for decreased concentration and dysphoric mood. Negative for confusion. The patient is not nervous/anxious.    All other systems reviewed and are negative.    Objective:     Vital Signs (Most Recent):  Temp: 98.8 °F (37.1 °C) (03/02/18 1200)  Pulse: 85 (03/02/18 1200)  Resp: 14 (03/02/18 1245)  BP: 134/77 (03/02/18 1200)  SpO2: 95 % (03/02/18 1245) Vital Signs (24h Range):  Temp:  [98.4 °F (36.9 °C)-99.2 °F (37.3 °C)] 98.8 °F (37.1 °C)  Pulse:  [72-94] 85  Resp:  [14-20] 14  SpO2:  [94 %-97 %] 95 %  BP: (123-137)/(73-88) 134/77     Weight: 57.5 kg (126 lb 12.2 oz)  Body mass index is 20.46 kg/m².    Intake/Output - Last 3 Shifts       02/28 0700 - 03/01 0659 03/01 0700 - 03/02 0659 03/02 0700 - 03/03 0659    P.O. 75 560     I.V. (mL/kg) 0 (0)      Blood 212      Other 810      IV Piggyback 100 100     TPN 83.4 1537.3     Total Intake(mL/kg) 1280.4 (21.7) 2197.3 (38.2)     Urine (mL/kg/hr) 0 (0)      Emesis/NG output 0 (0)      Other 2810 (2)      Stool 0 (0)      Blood 0 (0)      Total Output 2810        Net -1529.6 +2197.3             Urine Occurrence 0 x 0 x     Stool Occurrence 2 x 5 x     Emesis Occurrence 0 x            Physical Exam   Constitutional: He is oriented to person, place, and time. He appears well-developed. No distress.   Temporal and distal extremity muscle wasting   HENT:   Head: Normocephalic and atraumatic.   Mouth/Throat: No oropharyngeal exudate.   dylan tube   Eyes: EOM are normal. Pupils are equal, round, and reactive to light. Scleral icterus is present.   Neck: Normal range of motion. Neck supple. No JVD present. No thyromegaly present.   Cardiovascular: Normal rate, regular rhythm, normal heart sounds and intact distal pulses.    No murmur heard.  Pulmonary/Chest:  Effort normal. No respiratory distress. He has decreased breath sounds in the right middle field, the right lower field and the left lower field. He has no wheezes. He exhibits no tenderness.   Diminished to RLL   Abdominal: Soft. Bowel sounds are normal. He exhibits distension and ascites. There is tenderness. There is no rebound and no guarding.   Dressing to chevron.  Wd vac removed 1/12/18  Dependent edema present R flank; surrounding erythema   Musculoskeletal: Normal range of motion. He exhibits edema (2+ LE edema). He exhibits no tenderness.   Neurological: He is alert and oriented to person, place, and time. He has normal reflexes.   Skin: Skin is warm and dry. Capillary refill takes 2 to 3 seconds. He is not diaphoretic. No erythema.   jaundice   Psychiatric: He has a normal mood and affect. His behavior is normal. Judgment and thought content normal. His mood appears not anxious.   Depressed mood   Nursing note and vitals reviewed.      Laboratory:  Immunosuppressants         Stop Route Frequency     cycloSPORINE modified capsule 125 mg      -- Oral 2 times daily        CBC:     Recent Labs  Lab 03/02/18  0530   WBC 4.27   RBC 2.49*   HGB 7.6*   HCT 23.8*      MCV 96   MCH 30.5   MCHC 31.9*     CMP:     Recent Labs  Lab 03/02/18  0530   GLU 89   CALCIUM 9.4   ALBUMIN 1.8*   PROT 5.1*      K 4.0   CO2 22*      BUN 38*   CREATININE 4.1*   ALKPHOS 109   ALT <5*   AST 30   BILITOT 1.4*     Labs within the past 24 hours have been reviewed.    Diagnostic Results:  pertinent imaging reviewed

## 2018-03-02 NOTE — SUBJECTIVE & OBJECTIVE
Interval History:   Patient evaluated at bedside in NINO, no significant event overnight, blood pressure have been stable, no respiratory distress and no complaints.     Review of patient's allergies indicates:   Allergen Reactions    Bactrim [sulfamethoxazole-trimethoprim] Other (See Comments)     Mookie Trell Syndrome     Current Facility-Administered Medications   Medication Frequency    0.9%  NaCl infusion (for blood administration) Q24H PRN    0.9%  NaCl infusion PRN    0.9%  NaCl infusion Once    acetaminophen tablet 650 mg Q6H PRN    albuterol-ipratropium 2.5mg-0.5mg/3mL nebulizer solution 3 mL Q4H PRN    bisacodyl EC tablet 10 mg Daily    bisacodyl suppository 10 mg Daily PRN    cycloSPORINE modified (NEORAL) capsule 100 mg BID    dextrose 50% injection 12.5 g PRN    dextrose 50% injection 25 g PRN    docusate sodium capsule 100 mg BID    dronabinol capsule 2.5 mg Daily    epoetin maurisio injection 10,000 Units Every Mon, Wed, Fri    ertapenem (INVANZ) 0.5 g in sodium chloride 0.9% 100 mL IVPB Q24H    fat emulsion 20% infusion 250 mL Daily    fentaNYL injection 25 mcg Q2H PRN    glucagon (human recombinant) injection 1 mg PRN    glucose chewable tablet 16 g PRN    glucose chewable tablet 24 g PRN    heparin (porcine) injection 1,000 Units PRN    heparin (porcine) injection 5,000 Units Q8H    isavuconazonium sulfate 372 mg in sodium chloride 0.9% 250 mL Q24H    levetiracetam oral soln Soln 500 mg BID    levothyroxine tablet 75 mcg Before breakfast    metoclopramide HCl 5 mg/5 mL solution 10 mg QID (AC & HS)    multivitamin tablet 1 tablet Daily    naloxone 0.4 mg/mL injection 0.2 mg PRN    omeprazole capsule 40 mg QAM    ondansetron disintegrating tablet 8 mg Q6H PRN    ondansetron injection 4 mg Q6H PRN    oxyCODONE immediate release tablet 10 mg Q6H PRN    polyethylene glycol packet 17 g BID    prochlorperazine injection Soln 10 mg Q6H PRN    psyllium husk (aspartame) 3.4  PROCEDURE: ROUTINE AMBULATORY EEG  NAME:   Buster Angulo  EEG NUMBER: [id number]  ACCOUNT NUMBER : [[id number]  MRN:   [id number]  DATE OF SERVICE: 1/30/18  REF   PHYSICIAN:Dr Falguni Guilloyr  HISTORY/INDICATION: Seizure evaluation  MEDICATIONS:   CONDITIONS OF RECORDING: This is a routine 21-channel EEG recording performed in accordance with the international 10-20 system with one channel devoted to limited EKG. , Eye movement was recorded This study was done during states of  wakefulness and drowsy. Photic stimulation and hyperventilation were performed as activating procedures. DESCRIPTION:   Upon maximal arousal the posterior dominant rhythm has a frequency of 7-11Hz with an amplitude of 10uV. This activity is symmetric over the bilateral posterior derivations and attenuates with eye opening. Photic stimulation  did not significantly alter the tracing. Noted was a lot of Eye movement and  Muscle  artifact The patient becomes drowsy, but deeper stages of sleep are not attained . Normal sleep architecture is seen with stage II sleep recognized by the presence of symmetric vertex waves and sleep spindles. There are no focal abnormalities, epileptiform discharges, or electrographic seizures seen. There was Bradycardia. INTERPRETATION: The EEG  Revealed diffusely low amplitude with a lot of movement and muscle artifact, no significant asymmetry or overt epileptic activity seen      CLINICAL CORRELATION: A normal EEG does not definitively exclude a diagnosis of epilepsy if clinical suspicion is high consider sleep deprived EEG.      Alycia Closs, MD gram PwPk 1 packet BID    simethicone chewable tablet 80 mg TID PRN    sodium chloride 0.9% flush 3 mL PRN    TPN ADULT CENTRAL LINE CUSTOM Continuous    ursodiol oral suspension (conc: 60 mg/mL) 300 mg BID       Objective:     Vital Signs (Most Recent):  Temp: 98.4 °F (36.9 °C) (03/02/18 0755)  Pulse: 83 (03/02/18 0755)  Resp: 16 (03/02/18 0755)  BP: 131/81 (03/02/18 0321)  SpO2: (!) 94 % (03/02/18 0321)  O2 Device (Oxygen Therapy): room air (03/02/18 0321) Vital Signs (24h Range):  Temp:  [98.4 °F (36.9 °C)-99.2 °F (37.3 °C)] 98.4 °F (36.9 °C)  Pulse:  [81-94] 83  Resp:  [16-20] 16  SpO2:  [94 %-97 %] 94 %  BP: (129-135)/(79-83) 131/81     Weight: 57.5 kg (126 lb 12.2 oz) (03/02/18 0630)  Body mass index is 20.46 kg/m².  Body surface area is 1.64 meters squared.    I/O last 3 completed shifts:  In: 2455.6 [P.O.:635; IV Piggyback:200]  Out: 0     Physical Exam   Constitutional: He is oriented to person, place, and time.   HENT:   Mouth/Throat: No oropharyngeal exudate.   Eyes: No scleral icterus.   Cardiovascular: Normal rate and regular rhythm.    Pulmonary/Chest: Effort normal and breath sounds normal. No respiratory distress. He has no wheezes. He has no rales.   Abdominal: Soft. There is tenderness. There is no rebound.   Musculoskeletal: He exhibits edema.   Lymphadenopathy:     He has no cervical adenopathy.   Neurological: He is alert and oriented to person, place, and time.   Skin: He is not diaphoretic.       Significant Labs:  ABGs: No results for input(s): PH, PCO2, HCO3, POCSATURATED, BE in the last 168 hours.  BMP:   Recent Labs  Lab 03/02/18  0530   GLU 89      CO2 22*   BUN 38*   CREATININE 4.1*   CALCIUM 9.4   MG 2.5     CBC:   Recent Labs  Lab 03/02/18  0530   WBC 4.27   RBC 2.49*   HGB 7.6*   HCT 23.8*      MCV 96   MCH 30.5   MCHC 31.9*     CMP:   Recent Labs  Lab 03/02/18  0530   GLU 89   CALCIUM 9.4   ALBUMIN 1.8*   PROT 5.1*      K 4.0   CO2 22*      BUN 38*    CREATININE 4.1*   ALKPHOS 109   ALT <5*   AST 30   BILITOT 1.4*     All labs within the past 24 hours have been reviewed.

## 2018-03-02 NOTE — ASSESSMENT & PLAN NOTE
- Started on vanc/cefepime for ERCP. Now transitioned to vanc/zosyn.  - Blood cultures sent, prelim NGTD. Repeat blood cultures and chest xray today 2/10.  - thoracentesis 2/1 - fluid negative for infection.  - atelectasis noted on ct scan - start breathing tx & CPT.  - now cmv positive - started treatment 2/5/18.  - taken to OR 2/7 for exp lap - fluid collections drained, cultures pending.  - Patient with low grade fever 2/15, 100.3.  -CT A/P 2/15 with no evidence of IA fluid infection. Did show R pleural effusion s/p thoracentesis, cell count negative for infection, cx pending.  -Repeat blood cx 2/15 prelim NGTD, CMV PCR 2/15 negative   Complaining of abdominal pain. Is tender in RUQ.  -Remains with fevers  - Bilirubin remains elevated. Concerning for cholangitis. Antibiotics broadened 2/17. ID re consulted.   - ERCP  2/19, overall unremarkable findings. Did place new larger stents. Will need repeat ERCP in 10 weeks. Bilirubin is stable post ERCP.  -Tmax 101 2/21 PM again ID following. Plan to transition vanc/zosyn to ertapenem 2/22 as no improvement noted.   - Erta started 2/22.  -CT A/P obtained 2/26 with focal/loculated region of fluid in the lower right abdomen measuring approximately 8.7 x 5.5 cm.   -IR attempted drainage on fluid collection on 2/27, however, per IR,there was limited fluid interposed with bowel in the RLQ with no safe pocket seen for drainage.  -Tagged WBC scan 2/27, negative results.  -Remains with low grade fevers.   -Per transplant attending, will hold off on EGD at this time as concern anesthesia will put patient at further increase risk for seizure activity. Will plan for potenial EGD on Monday. Discussed with GI, appreciate their assistance.  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 EBV PCR negative. Adenovirus pending. Repeat CMV PCR pending.   -Back pain today along with worsening abdominal pain. Will obtain amylase, lipase, KUB, along with Liver US. CT A/P from 2/26 with  large amount of stool. Pending KUB, patient likely to need enema this afternoon. Tmax 99.2. ID following. Will also speak with IR on Monday about repeat paracentesis next week to rule out ongoing peritonitis as source of abdominal pain/recurrent fevers. Cont Ertapenem x 2 weeks.

## 2018-03-02 NOTE — PROGRESS NOTES
[facility]  Nephrology  Progress Note    Patient Name: Jhonny Diana  MRN: [id number]  Admission Date: 1/11/2018  Hospital Length of Stay: 50 days  Attending Provider: Nathanael Medina MD   Primary Care Physician: Primary Doctor No  Principal Problem:Fever    Subjective:     HPI: Jhonny Diana is a 28 y/o  male with PMHx relevant for ETOH cirrhosis.  S/p DDLT 10/19/2017; c/b seizures (swtiched off prograf to cyclo), ATN dialysis dependant in iHD MWF, last HD on 1/10, anuric, superficial wound infection s/p wound vac to chevron incision, and multiple admissions for fevers finally dx with peritonitis in November (WBC 5000, 75% PNM) neg for bile leak. HE gets recurrent paracentesis and has peritoneal drain. Other pertinent PMH current wound vac in place 2/2 wound infection, malnutrition requiring TPN for short course and ongoing hypoalbuminemia, and seizure activity while on prograf and has since been switched to cyclosporine without reoccurrence.  He is admitted to LTx service secondary to fever, abdominal pain, and N/V. He reports fever (103) for 1 day prior. Overnight, he developed N/V, reports small amount of green emesis with new left sided pain. He also endorses worsening SOB with exertion. He was scheduled as an outpatient for follow up paracentesis and IR drainage of fluid collection. CXR in ER shows large pleural effusion with subsegmental atelectasis. His ANC is 900. Broad spectrum antibiotics initiated in ED. Nephrology consulted for LAURA dialysis dependant co management. [facility] dialyses at [facility] under the care of Dr. Peterson via [facility] rowe.      Interval History:   Patient evaluated at bedside in NINO, no significant event overnight, blood pressure have been stable, no respiratory distress and no complaints.     Review of patient's allergies indicates:   Allergen Reactions    Bactrim [sulfamethoxazole-trimethoprim] Other (See Comments)     Mookie Trell Syndrome     Current  Facility-Administered Medications   Medication Frequency    0.9%  NaCl infusion (for blood administration) Q24H PRN    0.9%  NaCl infusion PRN    0.9%  NaCl infusion Once    acetaminophen tablet 650 mg Q6H PRN    albuterol-ipratropium 2.5mg-0.5mg/3mL nebulizer solution 3 mL Q4H PRN    bisacodyl EC tablet 10 mg Daily    bisacodyl suppository 10 mg Daily PRN    cycloSPORINE modified (NEORAL) capsule 100 mg BID    dextrose 50% injection 12.5 g PRN    dextrose 50% injection 25 g PRN    docusate sodium capsule 100 mg BID    dronabinol capsule 2.5 mg Daily    epoetin maurisio injection 10,000 Units Every Mon, Wed, Fri    ertapenem (INVANZ) 0.5 g in sodium chloride 0.9% 100 mL IVPB Q24H    fat emulsion 20% infusion 250 mL Daily    fentaNYL injection 25 mcg Q2H PRN    glucagon (human recombinant) injection 1 mg PRN    glucose chewable tablet 16 g PRN    glucose chewable tablet 24 g PRN    heparin (porcine) injection 1,000 Units PRN    heparin (porcine) injection 5,000 Units Q8H    isavuconazonium sulfate 372 mg in sodium chloride 0.9% 250 mL Q24H    levetiracetam oral soln Soln 500 mg BID    levothyroxine tablet 75 mcg Before breakfast    metoclopramide HCl 5 mg/5 mL solution 10 mg QID (AC & HS)    multivitamin tablet 1 tablet Daily    naloxone 0.4 mg/mL injection 0.2 mg PRN    omeprazole capsule 40 mg QAM    ondansetron disintegrating tablet 8 mg Q6H PRN    ondansetron injection 4 mg Q6H PRN    oxyCODONE immediate release tablet 10 mg Q6H PRN    polyethylene glycol packet 17 g BID    prochlorperazine injection Soln 10 mg Q6H PRN    psyllium husk (aspartame) 3.4 gram PwPk 1 packet BID    simethicone chewable tablet 80 mg TID PRN    sodium chloride 0.9% flush 3 mL PRN    TPN ADULT CENTRAL LINE CUSTOM Continuous    ursodiol oral suspension (conc: 60 mg/mL) 300 mg BID       Objective:     Vital Signs (Most Recent):  Temp: 98.4 °F (36.9 °C) (03/02/18 0755)  Pulse: 83 (03/02/18 0755)  Resp: 16  (03/02/18 0755)  BP: 131/81 (03/02/18 0321)  SpO2: (!) 94 % (03/02/18 0321)  O2 Device (Oxygen Therapy): room air (03/02/18 0321) Vital Signs (24h Range):  Temp:  [98.4 °F (36.9 °C)-99.2 °F (37.3 °C)] 98.4 °F (36.9 °C)  Pulse:  [81-94] 83  Resp:  [16-20] 16  SpO2:  [94 %-97 %] 94 %  BP: (129-135)/(79-83) 131/81     Weight: 57.5 kg (126 lb 12.2 oz) (03/02/18 0630)  Body mass index is 20.46 kg/m².  Body surface area is 1.64 meters squared.    I/O last 3 completed shifts:  In: 2455.6 [P.O.:635; IV Piggyback:200]  Out: 0     Physical Exam   Constitutional: He is oriented to person, place, and time.   HENT:   Mouth/Throat: No oropharyngeal exudate.   Eyes: No scleral icterus.   Cardiovascular: Normal rate and regular rhythm.    Pulmonary/Chest: Effort normal and breath sounds normal. No respiratory distress. He has no wheezes. He has no rales.   Abdominal: Soft. There is tenderness. There is no rebound.   Musculoskeletal: He exhibits edema.   Lymphadenopathy:     He has no cervical adenopathy.   Neurological: He is alert and oriented to person, place, and time.   Skin: He is not diaphoretic.       Significant Labs:  ABGs: No results for input(s): PH, PCO2, HCO3, POCSATURATED, BE in the last 168 hours.  BMP:   Recent Labs  Lab 03/02/18  0530   GLU 89      CO2 22*   BUN 38*   CREATININE 4.1*   CALCIUM 9.4   MG 2.5     CBC:   Recent Labs  Lab 03/02/18  0530   WBC 4.27   RBC 2.49*   HGB 7.6*   HCT 23.8*      MCV 96   MCH 30.5   MCHC 31.9*     CMP:   Recent Labs  Lab 03/02/18  0530   GLU 89   CALCIUM 9.4   ALBUMIN 1.8*   PROT 5.1*      K 4.0   CO2 22*      BUN 38*   CREATININE 4.1*   ALKPHOS 109   ALT <5*   AST 30   BILITOT 1.4*     All labs within the past 24 hours have been reviewed.       Assessment/Plan:     Acute renal failure with tubular necrosis    LAURA dialysisi dependant since 1016/2017  HD on TTS [facility] under the cre of Dr. Peterson  On HD for: 2.5 mo since 10/16/2017  Duration of  outpatient dialysis session - 3.5 hrs  EDW - TBD  Residual Renal Function - yes     Plan:  - Will provide dialysis for metabolic clearance and volume management in am via RIJ-PermCath, duration 3.5 hrs, UF 1-2 L as tolerated by patient, maintain MAP>65, will adjust bath to chem.   - Blood pressures stable            Win Sandoval  Nephrology  Fellow  [facility]    Pager [phone]   I have reviewed and concur with the fellow's history, physical, assessment, and plan. I have personally interviewed and examined the patient at bedside

## 2018-03-02 NOTE — ASSESSMENT & PLAN NOTE
- detected at 370 on CMV PCR 2/1.  - case discussed with ID and will hold off on treatment at this time given low WBC.   - undetected CMV PCR 2/8.  - continue Valcyte 200 mg every Mon, Wed, Fri.  - CMV PCR 2/15 negative  - Per ID, valcyte dose decreased to ppx dose on 2/18.  - CMV PCR 2/22 negative  - Repeat scope possibly Monday as pt with leukopenia, h/o CMV, and burning pain despite negative PCR

## 2018-03-02 NOTE — ASSESSMENT & PLAN NOTE
LAURA dialysisi dependant since 1016/2017  HD on TTS [facility] under the cre of Dr. Peterson  On HD for: 2.5 mo since 10/16/2017  Duration of outpatient dialysis session - 3.5 hrs  EDW - TBD  Residual Renal Function - yes     Plan:  - Will provide dialysis for metabolic clearance and volume management in am via RIJ-PermCath, duration 3.5 hrs, UF 1-2 L as tolerated by patient, maintain MAP>65, will adjust bath to chem.   - Blood pressures stable

## 2018-03-02 NOTE — PROGRESS NOTES
Pt arrived via stretcher.  Placed on cardiac monitor and b/p check every 15 minutes.  Eight chest wall Dialysis access prep with prevantic swab.  maintenace Hemodialysis started per orders.

## 2018-03-02 NOTE — ASSESSMENT & PLAN NOTE
- Post op course complicated by fevers and hyperbilirubinemia.  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  - Liver US 1/8 showed 3.9 cm complex fluid collection anterior to right lobe and moderate nonspecific complex ascites inferior to transplant.   - IR placed drain 1/11/18, cell count negative for infection.  - PBS consulted. ERCP 1/17 with stone and sludge. Pt placed on Actigall - treated with 10 day course of abx.   - Bili elevated - liver u/s 1/28  - Liver biopsy 1/23 without rejection.  - consulted PBS for ERCP - Choledocholithiasis was found along with sludge, stents placed 1/30.  - ERCP 2/19, overall unremarkable findings. Did place new larger stents. Will need repeat ERCP in 10 weeks. Bilirubin improving.  -Plan for repeat Liver US today as with continued abdominal pain.

## 2018-03-02 NOTE — ASSESSMENT & PLAN NOTE
- Enema ordered 1/20 and 1/21 with BM.  - Encourage ambulation.  - decrease narcotics.  - d/c metamucil and miralax bid as makes patient nauseated.  - increase reglan qid.  - small bowel follow through with normal findings.   - patient reports last BM 2/19   -schedule bisacodyl  -KUB 2/21 with significant amounts of stool and gas. Pt declines miralax, metamucil, colace.   -encouraged suppository.  - Improved on KUB 2/23  -Repeat KUB ordered today 3/2, awaiting results.

## 2018-03-03 LAB
ALBUMIN SERPL BCP-MCNC: 1.7 G/DL
ALP SERPL-CCNC: 127 U/L
ALT SERPL W/O P-5'-P-CCNC: <5 U/L
ANION GAP SERPL CALC-SCNC: 9 MMOL/L
AST SERPL-CCNC: 39 U/L
BACTERIA #/AREA URNS AUTO: NORMAL /HPF
BASOPHILS # BLD AUTO: 0.03 K/UL
BASOPHILS NFR BLD: 0.6 %
BILIRUB SERPL-MCNC: 1.5 MG/DL
BILIRUB UR QL STRIP: NEGATIVE
BUN SERPL-MCNC: 24 MG/DL
CALCIUM SERPL-MCNC: 8.6 MG/DL
CHLORIDE SERPL-SCNC: 104 MMOL/L
CLARITY UR REFRACT.AUTO: ABNORMAL
CO2 SERPL-SCNC: 25 MMOL/L
COLOR UR AUTO: YELLOW
CREAT SERPL-MCNC: 2.8 MG/DL
CYCLOSPORINE BLD LC/MS/MS-MCNC: 193 NG/ML
DIFFERENTIAL METHOD: ABNORMAL
EOSINOPHIL # BLD AUTO: 0 K/UL
EOSINOPHIL NFR BLD: 0 %
ERYTHROCYTE [DISTWIDTH] IN BLOOD BY AUTOMATED COUNT: 18.8 %
EST. GFR  (AFRICAN AMERICAN): 33.9 ML/MIN/1.73 M^2
EST. GFR  (NON AFRICAN AMERICAN): 29.4 ML/MIN/1.73 M^2
GLUCOSE SERPL-MCNC: 87 MG/DL
GLUCOSE UR QL STRIP: NEGATIVE
HCT VFR BLD AUTO: 23.2 %
HGB BLD-MCNC: 7.5 G/DL
HGB UR QL STRIP: NEGATIVE
HYALINE CASTS UR QL AUTO: 0 /LPF
IMM GRANULOCYTES # BLD AUTO: 0.07 K/UL
IMM GRANULOCYTES NFR BLD AUTO: 1.3 %
KETONES UR QL STRIP: NEGATIVE
LEUKOCYTE ESTERASE UR QL STRIP: NEGATIVE
LYMPHOCYTES # BLD AUTO: 1.2 K/UL
LYMPHOCYTES NFR BLD: 23.2 %
MAGNESIUM SERPL-MCNC: 2.2 MG/DL
MCH RBC QN AUTO: 30.9 PG
MCHC RBC AUTO-ENTMCNC: 32.3 G/DL
MCV RBC AUTO: 96 FL
MICROSCOPIC COMMENT: NORMAL
MONOCYTES # BLD AUTO: 1.1 K/UL
MONOCYTES NFR BLD: 20 %
NEUTROPHILS # BLD AUTO: 2.9 K/UL
NEUTROPHILS NFR BLD: 54.9 %
NITRITE UR QL STRIP: NEGATIVE
NRBC BLD-RTO: 0 /100 WBC
PH UR STRIP: 6 [PH] (ref 5–8)
PHOSPHATE SERPL-MCNC: 2.7 MG/DL
PLATELET # BLD AUTO: 158 K/UL
PLATELET BLD QL SMEAR: ABNORMAL
PMV BLD AUTO: 11.2 FL
POIKILOCYTOSIS BLD QL SMEAR: SLIGHT
POTASSIUM SERPL-SCNC: 4 MMOL/L
PROT SERPL-MCNC: 5.3 G/DL
PROT UR QL STRIP: ABNORMAL
RBC # BLD AUTO: 2.43 M/UL
RBC #/AREA URNS AUTO: 0 /HPF (ref 0–4)
SODIUM SERPL-SCNC: 138 MMOL/L
SP GR UR STRIP: 1.01 (ref 1–1.03)
SQUAMOUS #/AREA URNS AUTO: 1 /HPF
TACROLIMUS BLD-MCNC: 1.6 NG/ML
URN SPEC COLLECT METH UR: ABNORMAL
UROBILINOGEN UR STRIP-ACNC: NEGATIVE EU/DL
WBC # BLD AUTO: 5.34 K/UL
WBC #/AREA URNS AUTO: 0 /HPF (ref 0–5)

## 2018-03-03 PROCEDURE — 63600175 PHARM REV CODE 636 W HCPCS: Performed by: PHYSICIAN ASSISTANT

## 2018-03-03 PROCEDURE — 85025 COMPLETE CBC W/AUTO DIFF WBC: CPT

## 2018-03-03 PROCEDURE — 25000003 PHARM REV CODE 250: Performed by: NURSE PRACTITIONER

## 2018-03-03 PROCEDURE — 25000003 PHARM REV CODE 250: Performed by: SURGERY

## 2018-03-03 PROCEDURE — 87086 URINE CULTURE/COLONY COUNT: CPT

## 2018-03-03 PROCEDURE — 63600175 PHARM REV CODE 636 W HCPCS: Performed by: NURSE PRACTITIONER

## 2018-03-03 PROCEDURE — 94664 DEMO&/EVAL PT USE INHALER: CPT

## 2018-03-03 PROCEDURE — 25000003 PHARM REV CODE 250: Performed by: PHYSICIAN ASSISTANT

## 2018-03-03 PROCEDURE — 80158 DRUG ASSAY CYCLOSPORINE: CPT

## 2018-03-03 PROCEDURE — 99233 SBSQ HOSP IP/OBS HIGH 50: CPT | Mod: ,,, | Performed by: NURSE PRACTITIONER

## 2018-03-03 PROCEDURE — 81001 URINALYSIS AUTO W/SCOPE: CPT

## 2018-03-03 PROCEDURE — B4185 PARENTERAL SOL 10 GM LIPIDS: HCPCS | Performed by: NURSE PRACTITIONER

## 2018-03-03 PROCEDURE — 99900035 HC TECH TIME PER 15 MIN (STAT)

## 2018-03-03 PROCEDURE — 87040 BLOOD CULTURE FOR BACTERIA: CPT

## 2018-03-03 PROCEDURE — 63600175 PHARM REV CODE 636 W HCPCS: Mod: JG | Performed by: SURGERY

## 2018-03-03 PROCEDURE — 80197 ASSAY OF TACROLIMUS: CPT

## 2018-03-03 PROCEDURE — 36415 COLL VENOUS BLD VENIPUNCTURE: CPT

## 2018-03-03 PROCEDURE — 84100 ASSAY OF PHOSPHORUS: CPT

## 2018-03-03 PROCEDURE — 94799 UNLISTED PULMONARY SVC/PX: CPT

## 2018-03-03 PROCEDURE — 80053 COMPREHEN METABOLIC PANEL: CPT

## 2018-03-03 PROCEDURE — 83735 ASSAY OF MAGNESIUM: CPT

## 2018-03-03 PROCEDURE — 20600001 HC STEP DOWN PRIVATE ROOM

## 2018-03-03 PROCEDURE — A4217 STERILE WATER/SALINE, 500 ML: HCPCS | Performed by: NURSE PRACTITIONER

## 2018-03-03 RX ADMIN — OXYCODONE HYDROCHLORIDE 10 MG: 5 TABLET ORAL at 01:03

## 2018-03-03 RX ADMIN — HEPARIN SODIUM 5000 UNITS: 5000 INJECTION, SOLUTION INTRAVENOUS; SUBCUTANEOUS at 02:03

## 2018-03-03 RX ADMIN — CALCIUM GLUCONATE: 94 INJECTION, SOLUTION INTRAVENOUS at 09:03

## 2018-03-03 RX ADMIN — ONDANSETRON HYDROCHLORIDE 4 MG: 2 INJECTION, SOLUTION INTRAMUSCULAR; INTRAVENOUS at 07:03

## 2018-03-03 RX ADMIN — CYCLOSPORINE 125 MG: 100 CAPSULE, LIQUID FILLED ORAL at 09:03

## 2018-03-03 RX ADMIN — ONDANSETRON HYDROCHLORIDE 4 MG: 2 INJECTION, SOLUTION INTRAMUSCULAR; INTRAVENOUS at 09:03

## 2018-03-03 RX ADMIN — OMEPRAZOLE 40 MG: 40 CAPSULE, DELAYED RELEASE ORAL at 07:03

## 2018-03-03 RX ADMIN — SODIUM CHLORIDE: 9 INJECTION, SOLUTION INTRAVENOUS at 01:03

## 2018-03-03 RX ADMIN — OXYCODONE HYDROCHLORIDE 10 MG: 5 TABLET ORAL at 05:03

## 2018-03-03 RX ADMIN — METOCLOPRAMIDE HYDROCHLORIDE 10 MG: 5 SOLUTION ORAL at 05:03

## 2018-03-03 RX ADMIN — PROCHLORPERAZINE EDISYLATE 10 MG: 5 INJECTION INTRAMUSCULAR; INTRAVENOUS at 07:03

## 2018-03-03 RX ADMIN — DRONABINOL 2.5 MG: 2.5 CAPSULE ORAL at 05:03

## 2018-03-03 RX ADMIN — OXYCODONE HYDROCHLORIDE 10 MG: 5 TABLET ORAL at 07:03

## 2018-03-03 RX ADMIN — SODIUM CHLORIDE 0.5 G: 0.9 INJECTION, SOLUTION INTRAVENOUS at 09:03

## 2018-03-03 RX ADMIN — THERA TABS 1 TABLET: TAB at 09:03

## 2018-03-03 RX ADMIN — CYCLOSPORINE 175 MG: 100 CAPSULE, LIQUID FILLED ORAL at 05:03

## 2018-03-03 RX ADMIN — BISACODYL 10 MG: 5 TABLET, COATED ORAL at 09:03

## 2018-03-03 RX ADMIN — METOCLOPRAMIDE HYDROCHLORIDE 10 MG: 5 SOLUTION ORAL at 09:03

## 2018-03-03 RX ADMIN — LEVETIRACETAM 500 MG: 100 SOLUTION ORAL at 09:03

## 2018-03-03 RX ADMIN — METOCLOPRAMIDE HYDROCHLORIDE 10 MG: 5 SOLUTION ORAL at 11:03

## 2018-03-03 RX ADMIN — URSODIOL 300 MG: 300 CAPSULE ORAL at 09:03

## 2018-03-03 RX ADMIN — SOYBEAN OIL 250 ML: 20 INJECTION, SOLUTION INTRAVENOUS at 09:03

## 2018-03-03 RX ADMIN — LEVOTHYROXINE SODIUM 75 MCG: 75 TABLET ORAL at 05:03

## 2018-03-03 NOTE — PLAN OF CARE
"Problem: Patient Care Overview  Goal: Plan of Care Review  Outcome: Ongoing (interventions implemented as appropriate)  Pt oriented x 4. Pt reports he is "tired" and has slept most of day. Pt easily awoken. Pt talking in sleep. Pt up walking in [place] once today. Mother at bedside. Pt's appetite poor. Food and fluid intake encouraged. Pt on TPN at 48 ml/hr. Left UA midline clean dry and intact. Blood and urine cultures sent to lab today.   Pt free of injury and falls. Pt wearing nonskid socks when out of bed, bed in lowest position, side rails up x 2, call light within reach.       "

## 2018-03-04 PROBLEM — R17 ELEVATED BILIRUBIN: Status: RESOLVED | Noted: 2018-02-11 | Resolved: 2018-03-04

## 2018-03-04 LAB
ALBUMIN SERPL BCP-MCNC: 1.8 G/DL
ALP SERPL-CCNC: 123 U/L
ALT SERPL W/O P-5'-P-CCNC: 5 U/L
ANION GAP SERPL CALC-SCNC: 11 MMOL/L
ANISOCYTOSIS BLD QL SMEAR: SLIGHT
AST SERPL-CCNC: 42 U/L
BACTERIA UR CULT: NO GROWTH
BASOPHILS # BLD AUTO: ABNORMAL K/UL
BASOPHILS NFR BLD: 1 %
BILIRUB SERPL-MCNC: 1.7 MG/DL
BUN SERPL-MCNC: 42 MG/DL
CALCIUM SERPL-MCNC: 9.2 MG/DL
CHLORIDE SERPL-SCNC: 103 MMOL/L
CO2 SERPL-SCNC: 22 MMOL/L
CREAT SERPL-MCNC: 3.8 MG/DL
CYCLOSPORINE BLD LC/MS/MS-MCNC: 201 NG/ML
DIFFERENTIAL METHOD: ABNORMAL
EOSINOPHIL # BLD AUTO: ABNORMAL K/UL
EOSINOPHIL NFR BLD: 0 %
ERYTHROCYTE [DISTWIDTH] IN BLOOD BY AUTOMATED COUNT: 18.4 %
EST. GFR  (AFRICAN AMERICAN): 23.5 ML/MIN/1.73 M^2
EST. GFR  (NON AFRICAN AMERICAN): 20.3 ML/MIN/1.73 M^2
GLUCOSE SERPL-MCNC: 90 MG/DL
HADV DNA SERPL QL NAA+PROBE: NEGATIVE
HCT VFR BLD AUTO: 22.2 %
HGB BLD-MCNC: 7.3 G/DL
HYPOCHROMIA BLD QL SMEAR: ABNORMAL
IMM GRANULOCYTES # BLD AUTO: ABNORMAL K/UL
IMM GRANULOCYTES NFR BLD AUTO: ABNORMAL %
LYMPHOCYTES # BLD AUTO: ABNORMAL K/UL
LYMPHOCYTES NFR BLD: 10 %
MAGNESIUM SERPL-MCNC: 2.4 MG/DL
MCH RBC QN AUTO: 31.1 PG
MCHC RBC AUTO-ENTMCNC: 32.9 G/DL
MCV RBC AUTO: 95 FL
METAMYELOCYTES NFR BLD MANUAL: 1 %
MONOCYTES # BLD AUTO: ABNORMAL K/UL
MONOCYTES NFR BLD: 9 %
NEUTROPHILS NFR BLD: 68 %
NEUTS BAND NFR BLD MANUAL: 11 %
NRBC BLD-RTO: 0 /100 WBC
PHOSPHATE SERPL-MCNC: 4.1 MG/DL
PLATELET # BLD AUTO: 161 K/UL
PLATELET BLD QL SMEAR: ABNORMAL
PMV BLD AUTO: 11.2 FL
POTASSIUM SERPL-SCNC: 3.9 MMOL/L
PROT SERPL-MCNC: 5.3 G/DL
RBC # BLD AUTO: 2.35 M/UL
SODIUM SERPL-SCNC: 136 MMOL/L
TACROLIMUS BLD-MCNC: <1.5 NG/ML
WBC # BLD AUTO: 6.74 K/UL

## 2018-03-04 PROCEDURE — 25000003 PHARM REV CODE 250: Performed by: NURSE PRACTITIONER

## 2018-03-04 PROCEDURE — 25000003 PHARM REV CODE 250

## 2018-03-04 PROCEDURE — 80197 ASSAY OF TACROLIMUS: CPT

## 2018-03-04 PROCEDURE — 99233 SBSQ HOSP IP/OBS HIGH 50: CPT | Mod: ,,, | Performed by: INTERNAL MEDICINE

## 2018-03-04 PROCEDURE — 63600175 PHARM REV CODE 636 W HCPCS: Mod: JG | Performed by: SURGERY

## 2018-03-04 PROCEDURE — 25000003 PHARM REV CODE 250: Performed by: SURGERY

## 2018-03-04 PROCEDURE — 25000003 PHARM REV CODE 250: Performed by: PHYSICIAN ASSISTANT

## 2018-03-04 PROCEDURE — 20600001 HC STEP DOWN PRIVATE ROOM

## 2018-03-04 PROCEDURE — 84100 ASSAY OF PHOSPHORUS: CPT

## 2018-03-04 PROCEDURE — 80053 COMPREHEN METABOLIC PANEL: CPT

## 2018-03-04 PROCEDURE — 85007 BL SMEAR W/DIFF WBC COUNT: CPT

## 2018-03-04 PROCEDURE — B4185 PARENTERAL SOL 10 GM LIPIDS: HCPCS | Performed by: NURSE PRACTITIONER

## 2018-03-04 PROCEDURE — 63600175 PHARM REV CODE 636 W HCPCS: Performed by: NURSE PRACTITIONER

## 2018-03-04 PROCEDURE — 80158 DRUG ASSAY CYCLOSPORINE: CPT

## 2018-03-04 PROCEDURE — 63600175 PHARM REV CODE 636 W HCPCS: Performed by: PHYSICIAN ASSISTANT

## 2018-03-04 PROCEDURE — A4217 STERILE WATER/SALINE, 500 ML: HCPCS | Performed by: NURSE PRACTITIONER

## 2018-03-04 PROCEDURE — 85027 COMPLETE CBC AUTOMATED: CPT

## 2018-03-04 PROCEDURE — 83735 ASSAY OF MAGNESIUM: CPT

## 2018-03-04 RX ORDER — OXYCODONE HYDROCHLORIDE 5 MG/1
10 TABLET ORAL ONCE
Status: COMPLETED | OUTPATIENT
Start: 2018-03-04 | End: 2018-03-04

## 2018-03-04 RX ORDER — FENTANYL CITRATE 50 UG/ML
12.5 INJECTION, SOLUTION INTRAMUSCULAR; INTRAVENOUS ONCE
Status: COMPLETED | OUTPATIENT
Start: 2018-03-04 | End: 2018-03-04

## 2018-03-04 RX ADMIN — LEVETIRACETAM 500 MG: 100 SOLUTION ORAL at 09:03

## 2018-03-04 RX ADMIN — DRONABINOL 2.5 MG: 2.5 CAPSULE ORAL at 06:03

## 2018-03-04 RX ADMIN — CYCLOSPORINE 175 MG: 100 CAPSULE, LIQUID FILLED ORAL at 06:03

## 2018-03-04 RX ADMIN — OXYCODONE HYDROCHLORIDE 10 MG: 5 TABLET ORAL at 03:03

## 2018-03-04 RX ADMIN — ONDANSETRON HYDROCHLORIDE 4 MG: 2 INJECTION, SOLUTION INTRAMUSCULAR; INTRAVENOUS at 10:03

## 2018-03-04 RX ADMIN — PROCHLORPERAZINE EDISYLATE 10 MG: 5 INJECTION INTRAMUSCULAR; INTRAVENOUS at 09:03

## 2018-03-04 RX ADMIN — ONDANSETRON HYDROCHLORIDE 4 MG: 2 INJECTION, SOLUTION INTRAMUSCULAR; INTRAVENOUS at 06:03

## 2018-03-04 RX ADMIN — CYCLOSPORINE 175 MG: 100 CAPSULE, LIQUID FILLED ORAL at 09:03

## 2018-03-04 RX ADMIN — URSODIOL 300 MG: 300 CAPSULE ORAL at 09:03

## 2018-03-04 RX ADMIN — OXYCODONE HYDROCHLORIDE 10 MG: 5 TABLET ORAL at 04:03

## 2018-03-04 RX ADMIN — OXYCODONE HYDROCHLORIDE 10 MG: 5 TABLET ORAL at 01:03

## 2018-03-04 RX ADMIN — SOYBEAN OIL 250 ML: 20 INJECTION, SOLUTION INTRAVENOUS at 09:03

## 2018-03-04 RX ADMIN — METOCLOPRAMIDE HYDROCHLORIDE 10 MG: 5 SOLUTION ORAL at 06:03

## 2018-03-04 RX ADMIN — OMEPRAZOLE 40 MG: 40 CAPSULE, DELAYED RELEASE ORAL at 07:03

## 2018-03-04 RX ADMIN — URSODIOL 300 MG: 300 CAPSULE ORAL at 03:03

## 2018-03-04 RX ADMIN — FENTANYL CITRATE 12.5 MCG: 50 INJECTION INTRAMUSCULAR; INTRAVENOUS at 11:03

## 2018-03-04 RX ADMIN — SODIUM CHLORIDE: 9 INJECTION, SOLUTION INTRAVENOUS at 12:03

## 2018-03-04 RX ADMIN — OXYCODONE HYDROCHLORIDE 10 MG: 5 TABLET ORAL at 09:03

## 2018-03-04 RX ADMIN — METOCLOPRAMIDE HYDROCHLORIDE 10 MG: 5 SOLUTION ORAL at 09:03

## 2018-03-04 RX ADMIN — BISACODYL 10 MG: 5 TABLET, COATED ORAL at 09:03

## 2018-03-04 RX ADMIN — METOCLOPRAMIDE HYDROCHLORIDE 10 MG: 5 SOLUTION ORAL at 01:03

## 2018-03-04 RX ADMIN — LEVOTHYROXINE SODIUM 75 MCG: 75 TABLET ORAL at 06:03

## 2018-03-04 RX ADMIN — HEPARIN SODIUM 5000 UNITS: 5000 INJECTION, SOLUTION INTRAVENOUS; SUBCUTANEOUS at 01:03

## 2018-03-04 RX ADMIN — SODIUM CHLORIDE: 234 INJECTION INTRAMUSCULAR; INTRAVENOUS; SUBCUTANEOUS at 09:03

## 2018-03-04 RX ADMIN — SODIUM CHLORIDE 0.5 G: 0.9 INJECTION, SOLUTION INTRAVENOUS at 09:03

## 2018-03-04 NOTE — ASSESSMENT & PLAN NOTE
- h/o constipation, reports having regular BM's with bowel regimen.  - vomiting episode x 1 at home. Anti-emetics ordered PRN.  - KUB repeated 1/14/18 given increased abdominal pain- mild, generalized bowel distention suggesting ileus.  Diet changed to clears as tolerated for bowel rest .  - SHANE tube placed 1/16 for tube feeds. Not tolerating tube feeds, stopped 1/17/18.  - D/C shane 1/19, now in place as of 1/25.  - chronic constipation, Cont bowel regimen.   - See protein calorie malnutrition.  - TF have been on hold since 2/20 for N/V.   - D/C shane 2/23 as not tolerating TF  - 2/24 changes: Increased Reglan to 10 mg, D/C atovaquone, transitioned cyclo to Prograf, change twyla to BID, check Keppra level  - GI consulted. Plan for EGD beginning of next week as with continued nausea/vomiting with po intake. Appreciate GI recs.    - Cont to have nausea and vomiting w eating.  Cont TPN.

## 2018-03-04 NOTE — ASSESSMENT & PLAN NOTE
- Enema ordered 1/20 and 1/21 with BM.  - Encourage ambulation.  - decrease narcotics.  - d/c metamucil and miralax bid as makes patient nauseated.  - increase reglan qid.  - small bowel follow through with normal findings.   - patient reports last BM 2/19   -schedule bisacodyl  -KUB 2/21 with significant amounts of stool and gas. Pt declines miralax, metamucil, colace.   -encouraged suppository.  - Improved on KUB 2/23  - Pt had 4 BMs overnight.

## 2018-03-04 NOTE — ASSESSMENT & PLAN NOTE
- Poor PO intake since transplant requiring short course of TPN during previous hospital stay.   - albumin remains decreased but appetite slowly improving per pt.   - Dietary consulted. Will need to closely monitor PO intake.   - supplements also ordered.   - SHANE with tube feeding start 1/16. Stopped 1/17/18, was not tolerating. Shane removed 1/20.  - prealbumin 7 on 1/15.  - TPN started 1/16/18.   - Pt able to eat minimally 1/21 and 1/22 which is an improvement.  - gi consulted - EGD reviewed, increased ppi bid, miralax bid, and metamucil bid, decreased narcotics.  - continue reglan, placed SHANE tube with tube feeds - transitioned to nightly feeds. Not tolerating well due to nausea.   - small bowel follow through with normal findings.   - consulted gi for possible gj tube placement, holding off on this for now as with ongoing fevers.  -Dietary changed tube feeds back to 24 hours from nightly 2/19 as not eating enough during the day to meet caloric needs. TF on hold since 2/20.  - retrial marinol 2/22 as pt reports that's the only thing helping his appetite, in the evening 2/2 concern for possible drowsiness  - pt feels marinol is helping  - Pt vomited 2/23 after trying hamburger  - Start TPN 2/24- continue  - Pt able to 1/4 sandwich without vomiting 2/25.  Not been able to eat anything n more tan 48 hours per pt and mother.

## 2018-03-04 NOTE — ASSESSMENT & PLAN NOTE
- Enema ordered 1/20 and 1/21 with BM.  - Encourage ambulation.  - decrease narcotics.  - d/c metamucil and miralax bid as makes patient nauseated.  - increase reglan qid.  - small bowel follow through with normal findings.   - patient reports last BM 2/19   -schedule bisacodyl  -KUB 2/21 with significant amounts of stool and gas. Pt declines miralax, metamucil, colace.   -encouraged suppository.  - Improved on KUB 2/23  - Pt moving bowels.

## 2018-03-04 NOTE — ASSESSMENT & PLAN NOTE
- Started on vanc/cefepime for ERCP. Now transitioned to vanc/zosyn.  - Blood cultures sent, prelim NGTD. Repeat blood cultures and chest xray today 2/10.  - thoracentesis 2/1 - fluid negative for infection.  - atelectasis noted on ct scan - start breathing tx & CPT.  - now cmv positive - started treatment 2/5/18.  - taken to OR 2/7 for exp lap - fluid collections drained, cultures pending.  - Patient with low grade fever 2/15, 100.3.  -CT A/P 2/15 with no evidence of IA fluid infection. Did show R pleural effusion s/p thoracentesis, cell count negative for infection, cx pending.  -Repeat blood cx 2/15 prelim NGTD, CMV PCR 2/15 negative   Complaining of abdominal pain. Is tender in RUQ.  -Remains with fevers  - Bilirubin remains elevated. Concerning for cholangitis. Antibiotics broadened 2/17. ID re consulted.   - ERCP  2/19, overall unremarkable findings. Did place new larger stents. Will need repeat ERCP in 10 weeks. Bilirubin is stable post ERCP.  -Tmax 101 2/21 PM again ID following. Plan to transition vanc/zosyn to ertapenem 2/22 as no improvement noted.   - Erta started 2/22.  -CT A/P obtained 2/26 with focal/loculated region of fluid in the lower right abdomen measuring approximately 8.7 x 5.5 cm.   -IR attempted drainage on fluid collection on 2/27, however, per IR,there was limited fluid interposed with bowel in the RLQ with no safe pocket seen for drainage.  -Tagged WBC scan 2/27, negative results.  -Remains with low grade fevers.   -Per transplant attending, will hold off on EGD at this time as concern anesthesia will put patient at further increase risk for seizure activity. Will plan for potenial EGD on Monday. Discussed with GI, appreciate their assistance.  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 EBV PCR negative. Adenovirus pending. Repeat CMV PCR pending.   -Back pain today along with worsening abdominal pain improved.   4 BMs after enema.  Tmax 99.2. ID following. Cont Ertapenem.

## 2018-03-04 NOTE — PROGRESS NOTES
Notified Norma Blackwood NP that pt threw up while trying to have a bowel movement.  Pt believes he threw up the oxycodone IR tablets that the RN just gave him. Pt wondering in he can have the oxycodone again. One time dose of fentayl ordered and will be given when verified.

## 2018-03-04 NOTE — SUBJECTIVE & OBJECTIVE
Interval History:   No fevers overnight  Tried to eat, but led to vomiting  Still with nausea, LUQ pain  Per mom, twitching lips  Intermittent loose stools    Review of Systems   Constitutional: Negative for chills, diaphoresis and fever.   HENT: Negative for rhinorrhea and sore throat.    Respiratory: Negative for cough and shortness of breath.    Cardiovascular: Negative for chest pain and leg swelling.   Gastrointestinal: Positive for abdominal pain, diarrhea, nausea and vomiting.   Genitourinary: Negative for dysuria and hematuria.   Musculoskeletal: Negative for arthralgias and myalgias.   Skin: Negative for rash.   Neurological: Negative for headaches.     Objective:     Vital Signs (Most Recent):  Temp: 98.7 °F (37.1 °C) (03/04/18 0855)  Pulse: 81 (03/04/18 0855)  Resp: 18 (03/04/18 0855)  BP: 131/78 (03/04/18 0855)  SpO2: 95 % (03/04/18 0855) Vital Signs (24h Range):  Temp:  [98.7 °F (37.1 °C)-99.5 °F (37.5 °C)] 98.7 °F (37.1 °C)  Pulse:  [80-93] 81  Resp:  [16-18] 18  SpO2:  [94 %-97 %] 95 %  BP: (128-136)/(74-83) 131/78     Weight: 56.1 kg (123 lb 10.9 oz)  Body mass index is 19.96 kg/m².    Estimated Creatinine Clearance: 23 mL/min (A) (based on SCr of 3.8 mg/dL (H)).    Physical Exam   Constitutional: He is oriented to person, place, and time. He appears well-developed. He appears distressed.   HENT:   Head: Normocephalic and atraumatic.   Eyes: Conjunctivae and EOM are normal.   Neck: Normal range of motion. Neck supple.   Cardiovascular: Normal rate.    Pulmonary/Chest: Effort normal. No respiratory distress.   Abdominal: Soft. Bowel sounds are normal. He exhibits distension. There is tenderness. There is guarding.   Musculoskeletal: Normal range of motion. He exhibits no edema.   Neurological: He is alert and oriented to person, place, and time.   Skin: Skin is warm and dry. No rash noted. He is not diaphoretic. No erythema.   Psychiatric: He has a normal mood and affect. His behavior is normal.        Significant Labs: All pertinent labs within the past 24 hours have been reviewed.    Significant Imaging: I have reviewed all pertinent imaging results/findings within the past 24 hours.

## 2018-03-04 NOTE — PROGRESS NOTES
[facility]  Liver Transplant  Progress Note    Patient Name: Jhonny Diana  MRN: [id number]  Admission Date: 2018  Hospital Length of Stay: 52 days  Code Status: Full Code  Primary Care Provider: Primary Doctor No  Post-Operative Day: 136    ORGAN:   LIVER  Disease Etiology: Acute Alcoholic Hepatitis  Donor Type:    - Brain Death  CDC High Risk:   No  Donor CMV Status:   Donor CMV Status: Positive  Donor HBcAB:   Negative  Donor HCV Status:   Negative  Whole or Partial: Whole Liver  Biliary Anastomosis: End to End  Arterial Anatomy: Standard  Subjective:     History of Present Illness:  Jhonny Diana is a 28 y/o male with past medical history of alcoholic cirrhosis.  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 Found to have peritonitis in November (WBC 5000, 75% PNM) neg for bile leak. He was treated initially with vanc/cefepime. Repeat cell counts  with some improvement (WBC 1400, 45% PNM). He has undergone multiple paracenteses as well as abscess drainage of perihepatic fluid collections and treated with antimicrobial therapy but no positive cultures. Of note, biliary stricture also identified and ERCP performed on 2017 with sphincterotomy and biliary stent placed. Liver tests still have not normalized despite intervention, bilirubin and AP remain elevated. Other pertinent PMH current wound vac in place 2/2 wound infection, malnutrition requiring TPN for short course and ongoing hypoalbuminemia, and seizure activity while on prograf and has since been switched to cyclosporine without reoccurrence.  He presented to the ER for fever, abdominal pain, and N/V. He reports fever (103) for 1  day prior. Overnight, he developed N/V, reports small amount of green emesis with new left sided pain. He also endorses worsening SOB with exertion. He was scheduled as an outpatient for follow up paracentesis and IR drainage of fluid collection. CXR in ER shows large pleural effusion with subsegmental atelectasis. Infectious work up initiated in ER. His ANC is 900. Broad spectrum antibiotics initiated in ED. He denies chest pain, palpitations, diarrhea, constipation, or back pain. Denies any sick contacts.    Hospital Course:  Thoracentesis (1.2L off), Paracentesis (1.6L off), and IR drainage of fluid collection 1/11, all fluids negative for infection. ID consulted. Broad spectrum antibiotics d/c'd 1/15. Chronically malnourished with poor PO intake, prealbumin 7. Shane tube placed 1/15 for tube feedings.     ERCP 1/17 with sludge and a biliary stone which was removed and stent exchanged.  Remained with n/v; therefore, TF remained on hold and TPN continued.   Pt with fever s/p ERCP on 1/17 which continued until 1/19.  Vanc/cefepime restarted.  Blood cx 1/17 and 1/18 NGTD.  ID reconsulted.  Fungal markers sent.  CT C/A/P obtained.  With large R pleural effusion and ascites- both drained 1/19 and negative for infx per cell count. Pt afebrile 1/20. Liver biopsy 1/23 - without rejection.  EGD 1/25 - unremarkable for source of GI symptoms.  Resume diet along with other GI recommendations. SHANE tube placed for tube feeds.       2/22: Pt again spiked temp of 101 overnight, now down to 99. Resent Bcx overnight, prelim NGTD. Transitioned to Erta from vanc/zosyn per ID. Planning for tagged WBC scan on Monday. R sided abdominal erythema/dependent edema improving today.  TF have been on hold since 2/20. Will continue to hold today per pt request. Pt working on diet. KUB yesterday with significant stool and gas. Declines colace, miralax, metamucil, but willing to take daily bisacodyl. Pt agreeable to suppository today. If  suppository not successful can give mag citrate through dylan tonight. Will also retrial Marinol tonight as pt reported improvement in appetite previously. Will monitor closely for sedation.     2/23:  2 small BM 2/22.  Tolerated HD.  [place] removed.  Will attempt to eat this PM.      2/24:  Pt ate then vomited hamburger 2/23.  Will resume TPN.  D/C atovaquone, transition cyclo to Prograf, check Keppra level, increase Reglan to 0 mg, change twyla to BID for nausea.  Will give pentam today.    2/25:  TPN going well, pt ate 1/2 subway sandwhich without vomiting yesterday, will continue to monitor.  Plan to repeat EGD this week.  Pt with drop in H/h again and appears to be hemolyzing.  LDH elevated, hapto slightly low, LEE pending.  With neutropenia- Neupogen given.    2/26:  Pt with severe back pain s/p HD. Unclear if secondary to neupogen.  Repeat labs stable. Indium scan scheduled for 2/27- injection done today (back pain started prior). CT A/P obtained with fluid collection seen in pelvis.    2/27: Patient spiking fevers, 101. Indium scan, negative results. CT A/P obtained 2/26 with focal/loculated region of fluid in the lower right abdomen measuring approximately 8.7 x 5.5 cm. IR attempted drainage, however, per IR,there was  limited fluid interposed with bowel in the RLQ with no safe pocket seen for drainage.    2/28: Remains with low grade fevers. Neupogen given.     3/1: Patient with stuttering speech, trouble recalling specific words when speaking. Does have hx of seizure (on keppra) previously while on prograf. Switched to prograf 2/24 from cyclosporine. Neuro exam unremarkable. CT head without contrast obtained, unremarkable. In light of these symptoms, prograf d/c, cyclosporine restarted 3/1. Per transplant attending, held off on EGD at this time as concern anesthesia would put patient at further increase risk for seizure activity.     3/4: pt walked in room yesterday.  Had 1 BM.  No appetite still.  Cont TPN.   He reports swelling and back pain today.  He vomited after taking Oxy PO.  Fentanyl 12.5mg IV ordered.  Patient rested well afterward.  Remains afebrile.   ID following.   Cont Ertapenem.  Repeat blood and urine cx given continued malaise and stutter- blood cx prelim NGTD.  UA ok.  Urine cx pending.    Tentative plan for EGD on Monday to assess source of fevers/nausea/abdominal pain w bx for CMV and GVHD.  Will also reassess rather able to perform paracentesis.  Cont to monitor.     Scheduled Meds:   bisacodyl  10 mg Oral Daily    cycloSPORINE modified  175 mg Oral BID    docusate sodium  100 mg Oral BID    dronabinol  2.5 mg Oral Daily    epoetin maurisio (PROCRIT) injection  10,000 Units Intravenous Every Mon, Wed, Fri    ertapenem (INVANZ) IVPB  500 mg Intravenous Q24H    fat emulsion 20%  250 mL Intravenous Once    heparin (porcine)  5,000 Units Subcutaneous Q8H    custom IVPB builder   Intravenous Q24H    levetiracetam oral soln  500 mg Oral BID    levothyroxine  75 mcg Oral Before breakfast    metoclopramide HCl  10 mg Oral QID (AC & HS)    multivitamin  1 tablet Oral Daily    omeprazole  40 mg Oral QAM    polyethylene glycol  17 g Oral BID    psyllium husk (aspartame)  3.4 g Oral BID    ursodiol  300 mg Oral BID     Continuous Infusions:   TPN ADULT CENTRAL LINE CUSTOM 48 mL/hr at 03/03/18 2113    TPN ADULT CENTRAL LINE CUSTOM       PRN Meds:sodium chloride, sodium chloride 0.9%, acetaminophen, albuterol-ipratropium 2.5mg-0.5mg/3mL, bisacodyl, dextrose 50%, dextrose 50%, glucagon (human recombinant), glucose, glucose, heparin (porcine), naloxone, ondansetron, ondansetron, oxyCODONE, prochlorperazine, simethicone, sodium chloride 0.9%    Review of Systems   Constitutional: Positive for activity change, appetite change, fatigue and fever. Negative for chills.   HENT: Negative.  Negative for congestion and facial swelling.    Eyes: Negative for pain, discharge and visual disturbance.    Respiratory: Negative for cough, chest tightness, shortness of breath and wheezing.    Cardiovascular: Negative for chest pain, palpitations and leg swelling.   Gastrointestinal: Positive for abdominal distention, abdominal pain and nausea. Negative for constipation.   Endocrine: Negative.    Genitourinary: Positive for decreased urine volume. Negative for difficulty urinating.        Anuric   Musculoskeletal: Positive for back pain. Negative for arthralgias and myalgias.   Skin: Positive for color change and wound. Negative for rash.   Allergic/Immunologic: Positive for immunocompromised state.   Neurological: Positive for weakness. Negative for dizziness, seizures and headaches.   Psychiatric/Behavioral: Positive for decreased concentration and dysphoric mood. Negative for confusion. The patient is not nervous/anxious.    All other systems reviewed and are negative.    Objective:     Vital Signs (Most Recent):  Temp: 98.9 °F (37.2 °C) (03/04/18 1141)  Pulse: 79 (03/04/18 1141)  Resp: 18 (03/04/18 1141)  BP: 125/76 (03/04/18 1141)  SpO2: 96 % (03/04/18 1141) Vital Signs (24h Range):  Temp:  [98.7 °F (37.1 °C)-99.5 °F (37.5 °C)] 98.9 °F (37.2 °C)  Pulse:  [79-93] 79  Resp:  [16-18] 18  SpO2:  [94 %-97 %] 96 %  BP: (125-136)/(74-83) 125/76     Weight: 56.1 kg (123 lb 10.9 oz)  Body mass index is 19.96 kg/m².    Intake/Output - Last 3 Shifts       03/02 0700 - 03/03 0659 03/03 0700 - 03/04 0659 03/04 0700 - 03/05 0659    P.O. 630 480     Other 600      IV Piggyback 350 350     TPN 1037.1 647.4     Total Intake(mL/kg) 2617.1 (46.6) 1477.4 (26.3)     Urine (mL/kg/hr)  100 (0.1)     Emesis/NG output  0 (0)     Other 2600 (1.9) 0 (0)     Stool 0 (0) 0 (0)     Blood  0 (0)     Total Output 2600 100      Net +17.1 +1377.4             Urine Occurrence 0 x 0 x     Stool Occurrence 4 x 1 x     Emesis Occurrence  0 x           Physical Exam   Constitutional: He is oriented to person, place, and time. He appears  well-developed. No distress.   Temporal and distal extremity muscle wasting   HENT:   Head: Normocephalic and atraumatic.   Mouth/Throat: No oropharyngeal exudate.   dylan tube   Eyes: EOM are normal. Pupils are equal, round, and reactive to light. Scleral icterus is present.   Neck: Normal range of motion. Neck supple. No JVD present. No thyromegaly present.   Cardiovascular: Normal rate, regular rhythm, normal heart sounds and intact distal pulses.    No murmur heard.  Pulmonary/Chest: Effort normal. No respiratory distress. He has decreased breath sounds in the right middle field, the right lower field and the left lower field. He has no wheezes. He exhibits no tenderness.   Diminished to RLL   Abdominal: Soft. Bowel sounds are normal. He exhibits distension and ascites. There is tenderness. There is no rebound and no guarding.   Dressing to chevron.  Wd vac removed 1/12/18  Dependent edema present R flank; surrounding erythema   Musculoskeletal: Normal range of motion. He exhibits edema (2+ LE edema). He exhibits no tenderness.   Neurological: He is alert and oriented to person, place, and time. He has normal reflexes.   Skin: Skin is warm and dry. Capillary refill takes 2 to 3 seconds. He is not diaphoretic. No erythema.   jaundice   Psychiatric: He has a normal mood and affect. His behavior is normal. Judgment and thought content normal. His mood appears not anxious.   Depressed mood   Nursing note and vitals reviewed.      Laboratory:  Immunosuppressants         Stop Route Frequency     cycloSPORINE modified capsule 175 mg      -- Oral 2 times daily        CBC:     Recent Labs  Lab 03/04/18  0500   WBC 6.74   RBC 2.35*   HGB 7.3*   HCT 22.2*      MCV 95   MCH 31.1*   MCHC 32.9     CMP:     Recent Labs  Lab 03/04/18  0500   GLU 90   CALCIUM 9.2   ALBUMIN 1.8*   PROT 5.3*      K 3.9   CO2 22*      BUN 42*   CREATININE 3.8*   ALKPHOS 123   ALT 5*   AST 42*   BILITOT 1.7*     Labs within the past  24 hours have been reviewed.    Diagnostic Results:  pertinent imaging reviewed    Assessment/Plan:     Seizure    -Patient with stuttering speech, trouble recalling specific words when speaking. Does have hx of seizure (on keppra) previously while on prograf. Switched to prograf over the weekend from cyclosporine. Neuro exam unremarkable.   -CT head without contrast obtained, unremarkable.   -In light of these symptoms, prograf d/c, restart cyclosporine 3/1.  - remains w/o seizure.  Cont to have stutter/difficulty finding words.            Liver transplanted    - Post op course complicated by fevers and hyperbilirubinemia.  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  - Liver US 1/8 showed 3.9 cm complex fluid collection anterior to right lobe and moderate nonspecific complex ascites inferior to transplant.   - IR placed drain 1/11/18, cell count negative for infection.  - PBS consulted. ERCP 1/17 with stone and sludge. Pt placed on Actigall - treated with 10 day course of abx.   - Bili elevated - liver u/s 1/28  - Liver biopsy 1/23 without rejection.  - consulted PBS for ERCP - Choledocholithiasis was found along with sludge, stents placed 1/30.  - ERCP 2/19, overall unremarkable findings. Did place new larger stents. Will need repeat ERCP in 10 weeks. Bilirubin improving.  - Liver US reviewed w mod ascites.  Stent in CBD.          Prophylactic immunotherapy    - See long term use of immunosuppression.         Long-term use of immunosuppressant medication    - Maintenance IS with cyclosporine -->transitioned to Prograf 2/24 for continued nausea. However now with neurological symptoms with prograf and hx of seizure, prograf d/c and cyclosporine restarted 3/1.  - MMF on hold for infections and neutropenia.   - Continue to check cyclosporine and Prograf level daily during transition. Assess for toxicity and adjust level as needed.        At risk for opportunistic infections    - CMV detected 2/1  now on tx dose valcyte. CMV PCR 2/15 not detected. CMV PCR 2/22, negative   -Repeat CMV PCR 3/1 pending.   - Pentam given 2/24            Other neutropenia    - Neupogen given 2/25  -Neupogen given 3/1.          Other cytomegaloviral diseases    - detected at 370 on CMV PCR 2/1.  - case discussed with ID and will hold off on treatment at this time given low WBC.   - undetected CMV PCR 2/8.  - continue Valcyte 200 mg every Mon, Wed, Fri.  - CMV PCR 2/15 negative  - Per ID, valcyte dose decreased to ppx dose on 2/18.  - CMV PCR 2/22 negative  - Repeat scope possibly Monday as pt with leukopenia, h/o CMV, and burning pain despite negative PCR        Kidney transplant candidate              Fever    - Started on vanc/cefepime for ERCP. Now transitioned to vanc/zosyn.  - Blood cultures sent, prelim NGTD. Repeat blood cultures and chest xray today 2/10.  - thoracentesis 2/1 - fluid negative for infection.  - atelectasis noted on ct scan - start breathing tx & CPT.  - now cmv positive - started treatment 2/5/18.  - taken to OR 2/7 for exp lap - fluid collections drained, cultures pending.  - Patient with low grade fever 2/15, 100.3.  -CT A/P 2/15 with no evidence of IA fluid infection. Did show R pleural effusion s/p thoracentesis, cell count negative for infection, cx pending.  -Repeat blood cx 2/15 prelim NGTD, CMV PCR 2/15 negative   Complaining of abdominal pain. Is tender in RUQ.  -Remains with fevers  - Bilirubin remains elevated. Concerning for cholangitis. Antibiotics broadened 2/17. ID re consulted.   - ERCP  2/19, overall unremarkable findings. Did place new larger stents. Will need repeat ERCP in 10 weeks. Bilirubin is stable post ERCP.  -Tmax 101 2/21 PM again ID following. Plan to transition vanc/zosyn to ertapenem 2/22 as no improvement noted.   - Erta started 2/22.  -CT A/P obtained 2/26 with focal/loculated region of fluid in the lower right abdomen measuring approximately 8.7 x 5.5 cm.   -IR attempted  drainage on fluid collection on 2/27, however, per IR,there was limited fluid interposed with bowel in the RLQ with no safe pocket seen for drainage.  -Tagged WBC scan 2/27, negative results.  -Remains with low grade fevers.   -Per transplant attending, will hold off on EGD at this time as concern anesthesia will put patient at further increase risk for seizure activity. Will plan for potenial EGD on Monday. Discussed with GI, appreciate their assistance.  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 EBV PCR negative. Adenovirus pending. Repeat CMV PCR pending.   -Back pain today along with worsening abdominal pain improved.   4 BMs after enema 3  /2.  Tmax 99.2. ID following. Cont Ertapenem.           Constipation    - Enema ordered 1/20 and 1/21 with BM.  - Encourage ambulation.  - decrease narcotics.  - d/c metamucil and miralax bid as makes patient nauseated.  - increase reglan qid.  - small bowel follow through with normal findings.   - patient reports last BM 2/19   -schedule bisacodyl  -KUB 2/21 with significant amounts of stool and gas. Pt declines miralax, metamucil, colace.   -encouraged suppository.  - Improved on KUB 2/23  - Pt moving bowels.        Other ascites    - Paracentesis performed 1/11/18 with 1600 ml removed.    - fluid negative for infection.   - Repeat para 1/19 negative for infection per cell count.  - no fluid seen for paracentesis on 1/23.  - Plan to re attempt paracentesis next week to assess for peritonitis.         Severe protein-calorie malnutrition    - Poor PO intake since transplant requiring short course of TPN during previous hospital stay.   - albumin remains decreased but appetite slowly improving per pt.   - Dietary consulted. Will need to closely monitor PO intake.   - supplements also ordered.   - SHANE with tube feeding start 1/16. Stopped 1/17/18, was not tolerating. Shane removed 1/20.  - prealbumin 7 on 1/15.  - TPN started 1/16/18.   - Pt able to eat minimally 1/21  and 1/22 which is an improvement.  - gi consulted - EGD reviewed, increased ppi bid, miralax bid, and metamucil bid, decreased narcotics.  - continue reglan, placed SHANE tube with tube feeds - transitioned to nightly feeds. Not tolerating well due to nausea.   - small bowel follow through with normal findings.   - consulted gi for possible gj tube placement, holding off on this for now as with ongoing fevers.  -Dietary changed tube feeds back to 24 hours from nightly 2/19 as not eating enough during the day to meet caloric needs. TF on hold since 2/20.  - retrial marinol 2/22 as pt reports that's the only thing helping his appetite, in the evening 2/2 concern for possible drowsiness  - pt feels marinol is helping  - Pt vomited 2/23 after trying hamburger  - Start TPN 2/24- continue  - Pt able to 1/4 sandwich without vomiting 2/25.  Not been able to eat anything n more tan 48 hours per pt and mother.            Nausea and vomiting    - h/o constipation, reports having regular BM's with bowel regimen.  - vomiting episode x 1 at home. Anti-emetics ordered PRN.  - KUB repeated 1/14/18 given increased abdominal pain- mild, generalized bowel distention suggesting ileus.  Diet changed to clears as tolerated for bowel rest .  - SHANE tube placed 1/16 for tube feeds. Not tolerating tube feeds, stopped 1/17/18.  - D/C shane 1/19, now in place as of 1/25.  - chronic constipation, Cont bowel regimen.   - See protein calorie malnutrition.  - TF have been on hold since 2/20 for N/V. shane d/c'd 2/23 as not tolerating TF  - 2/24 changes: Increased Reglan to 10 mg, D/C atovaquone, transitioned cyclo to Prograf, change twyla to BID, check Keppra level  - GI consulted. Plan for EGD beginning of next week as with continued nausea/vomiting with po intake. Appreciate GI recs.    - Cont to have nausea and vomiting w medications and eating.  Cont TPN.               Recurrent pleural effusion on right    - CXR in ER with large right pleural  effusion with subjective c/o worsening SOB  - thoracentesis completed- 1200 ml removed.  SOB with significant improvement.   - Cell count reviewed and negative for infection.   - resp even and non labored.  O2 sat 94-98%.  - Repeat thora 1/19 negative for infection per cell count.  - Reaccumulating fluid on xray 1/21.  - thoracentesis 1/31 - 1.4L removed, negative for infection  - Reaccumulating fluid again on xray 2/10.   -Thoracentesis performed  2/16 with 1500 cc off, cell count neg for infection, cx no growth to date.   -CT A/P 2/26 with large R pleural effusion, may need repeat thora if fevers reoccur.          Biliary stricture of transplanted liver    - tbili with increase prompting ERCP 1/30 with stones and sludge, stents placed.  -  Repeat ERCP 2/19 as was having recurrent fevers with bilirubin stuck at 2.0-2.2 with overall unremarkable findings seen. Did place new larger stents. Bilirubin now improving. Will need repeat ERCP in 10 weeks.   - US 3/2 stent in CBD.        Anemia of chronic disease    - H/H stable. Cont to monitor with daily cbc.         Delayed surgical wound healing    - wd vac removed 1/12/18.  Wound healing well. Aquacel AG and Mepilex border dressing applied via wound care recs.           Acute renal failure with tubular necrosis    - HD resumed on previous admission. Now anuric and dialyzes M-W-F.  - Nephrology following.    - ktm consulted for possible kidney transplant.            VTE Risk Mitigation         Ordered     heparin (porcine) injection 1,000 Units  As needed (PRN)     Route:  Intra-Catheter        02/05/18 0945     heparin (porcine) injection 5,000 Units  Every 8 hours     Route:  Subcutaneous        01/31/18 0957     Medium Risk of VTE  Once      01/11/18 0351     Place sequential compression device  Until discontinued      01/11/18 0351          The patients clinical status was discussed at multidisplinary rounds, involving transplant surgery, transplant medicine,  pharmacy, nursing, nutrition, and social work    Discharge Planning:  Monitor kidney function --> may need outpt HD  Monitor HH needs vs rehab for deconditioned status      Norma Blackwood NP  Liver Transplant  [facility]

## 2018-03-04 NOTE — ASSESSMENT & PLAN NOTE
- Started on vanc/cefepime for ERCP. Now transitioned to vanc/zosyn.  - Blood cultures sent, prelim NGTD. Repeat blood cultures and chest xray today 2/10.  - thoracentesis 2/1 - fluid negative for infection.  - atelectasis noted on ct scan - start breathing tx & CPT.  - now cmv positive - started treatment 2/5/18.  - taken to OR 2/7 for exp lap - fluid collections drained, cultures pending.  - Patient with low grade fever 2/15, 100.3.  -CT A/P 2/15 with no evidence of IA fluid infection. Did show R pleural effusion s/p thoracentesis, cell count negative for infection, cx pending.  -Repeat blood cx 2/15 prelim NGTD, CMV PCR 2/15 negative   Complaining of abdominal pain. Is tender in RUQ.  -Remains with fevers  - Bilirubin remains elevated. Concerning for cholangitis. Antibiotics broadened 2/17. ID re consulted.   - ERCP  2/19, overall unremarkable findings. Did place new larger stents. Will need repeat ERCP in 10 weeks. Bilirubin is stable post ERCP.  -Tmax 101 2/21 PM again ID following. Plan to transition vanc/zosyn to ertapenem 2/22 as no improvement noted.   - Erta started 2/22.  -CT A/P obtained 2/26 with focal/loculated region of fluid in the lower right abdomen measuring approximately 8.7 x 5.5 cm.   -IR attempted drainage on fluid collection on 2/27, however, per IR,there was limited fluid interposed with bowel in the RLQ with no safe pocket seen for drainage.  -Tagged WBC scan 2/27, negative results.  -Remains with low grade fevers.   -Per transplant attending, will hold off on EGD at this time as concern anesthesia will put patient at further increase risk for seizure activity. Will plan for potenial EGD on Monday. Discussed with GI, appreciate their assistance.  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 EBV PCR negative. Adenovirus pending. Repeat CMV PCR pending.   -Back pain today along with worsening abdominal pain improved.   4 BMs after enema 3  /2.  Tmax 99.2. ID following. Cont Ertapenem.

## 2018-03-04 NOTE — ASSESSMENT & PLAN NOTE
-Patient with stuttering speech, trouble recalling specific words when speaking. Does have hx of seizure (on keppra) previously while on prograf. Switched to prograf over the weekend from cyclosporine. Neuro exam unremarkable.   -CT head without contrast obtained, unremarkable.   -In light of these symptoms, prograf d/c, restart cyclosporine 3/1.  - remains w/o seizure.  Cont to have stutter/difficulty finding words.

## 2018-03-04 NOTE — ASSESSMENT & PLAN NOTE
- Paracentesis performed 1/11/18 with 1600 ml removed.    - fluid negative for infection.   - Repeat para 1/19 negative for infection per cell count.  - no fluid seen for paracentesis on 1/23.  - Plan to re attempt paracentesis next week to assess for peritonitis.

## 2018-03-04 NOTE — PLAN OF CARE
Problem: Patient Care Overview  Goal: Plan of Care Review  Outcome: Ongoing (interventions implemented as appropriate)  Pt AAO x 4. Pt states he is very sleepy. Pt slept good portion of day. Pt up in chair for a few hours this morning. Pt complaining of pain to back. Pt complaining of nausea. Pt had one occurrence of emesis while trying to have bowel movement. zofran given. Pt to be NPO at midnight for EGD. Oral intake poor. Pt encouraged to drink fluids PO and eat. Pt on TPN at 48 ml/hr.   Pt free of falls and injury, pt wearing nonskid socks when out of bed, bed in lowest position, side rails up x 2, call light within reach. Mother at bedside.

## 2018-03-04 NOTE — SUBJECTIVE & OBJECTIVE
Scheduled Meds:   bisacodyl  10 mg Oral Daily    cycloSPORINE modified  175 mg Oral BID    docusate sodium  100 mg Oral BID    dronabinol  2.5 mg Oral Daily    epoetin maurisio (PROCRIT) injection  10,000 Units Intravenous Every Mon, Wed, Fri    ertapenem (INVANZ) IVPB  500 mg Intravenous Q24H    fat emulsion 20%  250 mL Intravenous Once    heparin (porcine)  5,000 Units Subcutaneous Q8H    custom IVPB builder   Intravenous Q24H    levetiracetam oral soln  500 mg Oral BID    levothyroxine  75 mcg Oral Before breakfast    metoclopramide HCl  10 mg Oral QID (AC & HS)    multivitamin  1 tablet Oral Daily    omeprazole  40 mg Oral QAM    polyethylene glycol  17 g Oral BID    psyllium husk (aspartame)  3.4 g Oral BID    ursodiol  300 mg Oral BID     Continuous Infusions:   TPN ADULT CENTRAL LINE CUSTOM 48 mL/hr at 03/02/18 2204    TPN ADULT CENTRAL LINE CUSTOM       PRN Meds:sodium chloride, sodium chloride 0.9%, acetaminophen, albuterol-ipratropium 2.5mg-0.5mg/3mL, bisacodyl, dextrose 50%, dextrose 50%, glucagon (human recombinant), glucose, glucose, heparin (porcine), naloxone, ondansetron, ondansetron, oxyCODONE, prochlorperazine, simethicone, sodium chloride 0.9%    Review of Systems   Constitutional: Positive for activity change, appetite change, fatigue and fever. Negative for chills.   HENT: Negative.  Negative for congestion and facial swelling.    Eyes: Negative for pain, discharge and visual disturbance.   Respiratory: Negative for cough, chest tightness, shortness of breath and wheezing.    Cardiovascular: Negative for chest pain, palpitations and leg swelling.   Gastrointestinal: Positive for abdominal distention, abdominal pain and nausea. Negative for constipation.   Endocrine: Negative.    Genitourinary: Positive for decreased urine volume. Negative for difficulty urinating.        Anuric   Musculoskeletal: Positive for back pain. Negative for arthralgias and myalgias.   Skin: Positive for  color change and wound. Negative for rash.   Allergic/Immunologic: Positive for immunocompromised state.   Neurological: Positive for weakness. Negative for dizziness, seizures and headaches.   Psychiatric/Behavioral: Positive for decreased concentration and dysphoric mood. Negative for confusion. The patient is not nervous/anxious.    All other systems reviewed and are negative.    Objective:     Vital Signs (Most Recent):  Temp: 99.2 °F (37.3 °C) (03/03/18 1608)  Pulse: 80 (03/03/18 1608)  Resp: 16 (03/03/18 1608)  BP: 130/82 (03/03/18 1608)  SpO2: (!) 94 % (03/03/18 1608) Vital Signs (24h Range):  Temp:  [98.9 °F (37.2 °C)-99.8 °F (37.7 °C)] 99.2 °F (37.3 °C)  Pulse:  [79-92] 80  Resp:  [16-18] 16  SpO2:  [94 %-97 %] 94 %  BP: (110-134)/(61-82) 130/82     Weight: 56.1 kg (123 lb 10.9 oz)  Body mass index is 19.96 kg/m².    Intake/Output - Last 3 Shifts       03/01 0700 - 03/02 0659 03/02 0700 - 03/03 0659 03/03 0700 - 03/04 0659    P.O. 560 630 270    I.V. (mL/kg)       Blood       Other  600     IV Piggyback 100 350     TPN 1537.3 1037.1     Total Intake(mL/kg) 2197.3 (38.2) 2617.1 (46.6) 270 (4.8)    Urine (mL/kg/hr)   100 (0.2)    Emesis/NG output   0 (0)    Other  2600 (1.9) 0 (0)    Stool  0 (0) 0 (0)    Blood   0 (0)    Total Output   2600 100    Net +2197.3 +17.1 +170           Urine Occurrence 0 x 0 x 0 x    Stool Occurrence 5 x 4 x 0 x    Emesis Occurrence   0 x          Physical Exam   Constitutional: He is oriented to person, place, and time. He appears well-developed. No distress.   Temporal and distal extremity muscle wasting   HENT:   Head: Normocephalic and atraumatic.   Mouth/Throat: No oropharyngeal exudate.   dylan tube   Eyes: EOM are normal. Pupils are equal, round, and reactive to light. Scleral icterus is present.   Neck: Normal range of motion. Neck supple. No JVD present. No thyromegaly present.   Cardiovascular: Normal rate, regular rhythm, normal heart sounds and intact distal pulses.     No murmur heard.  Pulmonary/Chest: Effort normal. No respiratory distress. He has decreased breath sounds in the right middle field, the right lower field and the left lower field. He has no wheezes. He exhibits no tenderness.   Diminished to RLL   Abdominal: Soft. Bowel sounds are normal. He exhibits distension and ascites. There is tenderness. There is no rebound and no guarding.   Dressing to chevron.  Wd vac removed 1/12/18  Dependent edema present R flank; surrounding erythema   Musculoskeletal: Normal range of motion. He exhibits edema (2+ LE edema). He exhibits no tenderness.   Neurological: He is alert and oriented to person, place, and time. He has normal reflexes.   Skin: Skin is warm and dry. Capillary refill takes 2 to 3 seconds. He is not diaphoretic. No erythema.   jaundice   Psychiatric: He has a normal mood and affect. His behavior is normal. Judgment and thought content normal. His mood appears not anxious.   Depressed mood   Nursing note and vitals reviewed.      Laboratory:  Immunosuppressants         Stop Route Frequency     cycloSPORINE modified capsule 175 mg      -- Oral 2 times daily        CBC:     Recent Labs  Lab 03/03/18  0500   WBC 5.34   RBC 2.43*   HGB 7.5*   HCT 23.2*      MCV 96   MCH 30.9   MCHC 32.3     CMP:     Recent Labs  Lab 03/03/18  0500   GLU 87   CALCIUM 8.6*   ALBUMIN 1.7*   PROT 5.3*      K 4.0   CO2 25      BUN 24*   CREATININE 2.8*   ALKPHOS 127   ALT <5*   AST 39   BILITOT 1.5*     Labs within the past 24 hours have been reviewed.    Diagnostic Results:  pertinent imaging reviewed

## 2018-03-04 NOTE — ASSESSMENT & PLAN NOTE
- tbili with increase prompting ERCP 1/30 with stones and sludge, stents placed.  -  Repeat ERCP 2/19 as was having recurrent fevers with bilirubin stuck at 2.0-2.2 with overall unremarkable findings seen. Did place new larger stents. Bilirubin now improving. Will need repeat ERCP in 10 weeks.   - US 3/2 stent in CBD.

## 2018-03-04 NOTE — ASSESSMENT & PLAN NOTE
- h/o constipation, reports having regular BM's with bowel regimen.  - vomiting episode x 1 at home. Anti-emetics ordered PRN.  - KUB repeated 1/14/18 given increased abdominal pain- mild, generalized bowel distention suggesting ileus.  Diet changed to clears as tolerated for bowel rest .  - SHANE tube placed 1/16 for tube feeds. Not tolerating tube feeds, stopped 1/17/18.  - D/C shane 1/19, now in place as of 1/25.  - chronic constipation, Cont bowel regimen.   - See protein calorie malnutrition.  - TF have been on hold since 2/20 for N/V. shane d/c'd 2/23 as not tolerating TF  - 2/24 changes: Increased Reglan to 10 mg, D/C atovaquone, transitioned cyclo to Prograf, change twyla to BID, check Keppra level  - GI consulted. Plan for EGD beginning of next week as with continued nausea/vomiting with po intake. Appreciate GI recs.    - Cont to have nausea and vomiting w medications and eating.  Cont TPN.

## 2018-03-04 NOTE — PROGRESS NOTES
[facility]  Liver Transplant  Progress Note    Patient Name: Jhonny Diana  MRN: [id number]  Admission Date: 2018  Hospital Length of Stay: 51 days  Code Status: Full Code  Primary Care Provider: Primary Doctor No  Post-Operative Day: 135    ORGAN:   LIVER  Disease Etiology: Acute Alcoholic Hepatitis  Donor Type:    - Brain Death  CDC High Risk:   No  Donor CMV Status:   Donor CMV Status: Positive  Donor HBcAB:   Negative  Donor HCV Status:   Negative  Whole or Partial: Whole Liver  Biliary Anastomosis: End to End  Arterial Anatomy: Standard  Subjective:     History of Present Illness:  Jhonny Diana is a 28 y/o male with past medical history of alcoholic cirrhosis.  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 Found to have peritonitis in November (WBC 5000, 75% PNM) neg for bile leak. He was treated initially with vanc/cefepime. Repeat cell counts  with some improvement (WBC 1400, 45% PNM). He has undergone multiple paracenteses as well as abscess drainage of perihepatic fluid collections and treated with antimicrobial therapy but no positive cultures. Of note, biliary stricture also identified and ERCP performed on 2017 with sphincterotomy and biliary stent placed. Liver tests still have not normalized despite intervention, bilirubin and AP remain elevated. Other pertinent PMH current wound vac in place 2/2 wound infection, malnutrition requiring TPN for short course and ongoing hypoalbuminemia, and seizure activity while on prograf and has since been switched to cyclosporine without reoccurrence.  He presented to the ER for fever, abdominal pain, and N/V. He reports fever (103) for 1  day prior. Overnight, he developed N/V, reports small amount of green emesis with new left sided pain. He also endorses worsening SOB with exertion. He was scheduled as an outpatient for follow up paracentesis and IR drainage of fluid collection. CXR in ER shows large pleural effusion with subsegmental atelectasis. Infectious work up initiated in ER. His ANC is 900. Broad spectrum antibiotics initiated in ED. He denies chest pain, palpitations, diarrhea, constipation, or back pain. Denies any sick contacts.    Hospital Course:  Thoracentesis (1.2L off), Paracentesis (1.6L off), and IR drainage of fluid collection 1/11, all fluids negative for infection. ID consulted. Broad spectrum antibiotics d/c'd 1/15. Chronically malnourished with poor PO intake, prealbumin 7. Shane tube placed 1/15 for tube feedings.     ERCP 1/17 with sludge and a biliary stone which was removed and stent exchanged.  Remained with n/v; therefore, TF remained on hold and TPN continued.   Pt with fever s/p ERCP on 1/17 which continued until 1/19.  Vanc/cefepime restarted.  Blood cx 1/17 and 1/18 NGTD.  ID reconsulted.  Fungal markers sent.  CT C/A/P obtained.  With large R pleural effusion and ascites- both drained 1/19 and negative for infx per cell count. Pt afebrile 1/20. Liver biopsy 1/23 - without rejection.  EGD 1/25 - unremarkable for source of GI symptoms.  Resume diet along with other GI recommendations. SHANE tube placed for tube feeds.       2/22: Pt again spiked temp of 101 overnight, now down to 99. Resent Bcx overnight, prelim NGTD. Transitioned to Erta from vanc/zosyn per ID. Planning for tagged WBC scan on Monday. R sided abdominal erythema/dependent edema improving today.  TF have been on hold since 2/20. Will continue to hold today per pt request. Pt working on diet. KUB yesterday with significant stool and gas. Declines colace, miralax, metamucil, but willing to take daily bisacodyl. Pt agreeable to suppository today. If  suppository not successful can give mag citrate through dylan tonight. Will also retrial Marinol tonight as pt reported improvement in appetite previously. Will monitor closely for sedation.     2/23:  2 small BM 2/22.  Tolerated HD.  [place] removed.  Will attempt to eat this PM.      2/24:  Pt ate then vomited hamburger 2/23.  Will resume TPN.  D/C atovaquone, transition cyclo to Prograf, check Keppra level, increase Reglan to 0 mg, change twyla to BID for nausea.  Will give pentam today.    2/25:  TPN going well, pt ate 1/2 subway sandwhich without vomiting yesterday, will continue to monitor.  Plan to repeat EGD this week.  Pt with drop in H/h again and appears to be hemolyzing.  LDH elevated, hapto slightly low, LEE pending.  With neutropenia- Neupogen given.    2/26:  Pt with severe back pain s/p HD. Unclear if secondary to neupogen.  Repeat labs stable. Indium scan scheduled for 2/27- injection done today (back pain started prior). CT A/P obtained with fluid collection seen in pelvis.    2/27: Patient spiking fevers, 101. Indium scan, negative results. CT A/P obtained 2/26 with focal/loculated region of fluid in the lower right abdomen measuring approximately 8.7 x 5.5 cm. IR attempted drainage, however, per IR,there was  limited fluid interposed with bowel in the RLQ with no safe pocket seen for drainage.    2/28: Remains with low grade fevers. Neupogen given.     3/1: Patient with stuttering speech, trouble recalling specific words when speaking. Does have hx of seizure (on keppra) previously while on prograf. Switched to prograf 2/24 from cyclosporine. Neuro exam unremarkable. CT head without contrast obtained, unremarkable. In light of these symptoms, prograf d/c, cyclosporine restarted 3/1. Per transplant attending, held off on EGD at this time as concern anesthesia would put patient at further increase risk for seizure activity.     3/3: No acute events overnight. Patient w/o energy and not eating.  Cont TPN.   Passing flatus.  BMx4.  Abdominal pain somewhat better. Remains afebrile.   ID following.   Cont Ertapenem.  Plan to repeat blood and urine cx given continued malaise and stutter.  Tentative plan for EGD on Monday to assess source of fevers/nausea/abdominal pain w bx for CMV and GVHD.  Will also reassess rather able to perform paracentesis.  Cont to monitor.     Scheduled Meds:   bisacodyl  10 mg Oral Daily    cycloSPORINE modified  175 mg Oral BID    docusate sodium  100 mg Oral BID    dronabinol  2.5 mg Oral Daily    epoetin maurisio (PROCRIT) injection  10,000 Units Intravenous Every Mon, Wed, Fri    ertapenem (INVANZ) IVPB  500 mg Intravenous Q24H    fat emulsion 20%  250 mL Intravenous Once    heparin (porcine)  5,000 Units Subcutaneous Q8H    custom IVPB builder   Intravenous Q24H    levetiracetam oral soln  500 mg Oral BID    levothyroxine  75 mcg Oral Before breakfast    metoclopramide HCl  10 mg Oral QID (AC & HS)    multivitamin  1 tablet Oral Daily    omeprazole  40 mg Oral QAM    polyethylene glycol  17 g Oral BID    psyllium husk (aspartame)  3.4 g Oral BID    ursodiol  300 mg Oral BID     Continuous Infusions:   TPN ADULT CENTRAL LINE CUSTOM 48 mL/hr at 03/02/18 2204    TPN ADULT CENTRAL LINE CUSTOM       PRN Meds:sodium chloride, sodium chloride 0.9%, acetaminophen, albuterol-ipratropium 2.5mg-0.5mg/3mL, bisacodyl, dextrose 50%, dextrose 50%, glucagon (human recombinant), glucose, glucose, heparin (porcine), naloxone, ondansetron, ondansetron, oxyCODONE, prochlorperazine, simethicone, sodium chloride 0.9%    Review of Systems   Constitutional: Positive for activity change, appetite change, fatigue and fever. Negative for chills.   HENT: Negative.  Negative for congestion and facial swelling.    Eyes: Negative for pain, discharge and visual disturbance.   Respiratory: Negative for cough, chest tightness, shortness of breath and wheezing.    Cardiovascular: Negative for chest pain,  palpitations and leg swelling.   Gastrointestinal: Positive for abdominal distention, abdominal pain and nausea. Negative for constipation.   Endocrine: Negative.    Genitourinary: Positive for decreased urine volume. Negative for difficulty urinating.        Anuric   Musculoskeletal: Positive for back pain. Negative for arthralgias and myalgias.   Skin: Positive for color change and wound. Negative for rash.   Allergic/Immunologic: Positive for immunocompromised state.   Neurological: Positive for weakness. Negative for dizziness, seizures and headaches.   Psychiatric/Behavioral: Positive for decreased concentration and dysphoric mood. Negative for confusion. The patient is not nervous/anxious.    All other systems reviewed and are negative.    Objective:     Vital Signs (Most Recent):  Temp: 99.2 °F (37.3 °C) (03/03/18 1608)  Pulse: 80 (03/03/18 1608)  Resp: 16 (03/03/18 1608)  BP: 130/82 (03/03/18 1608)  SpO2: (!) 94 % (03/03/18 1608) Vital Signs (24h Range):  Temp:  [98.9 °F (37.2 °C)-99.8 °F (37.7 °C)] 99.2 °F (37.3 °C)  Pulse:  [79-92] 80  Resp:  [16-18] 16  SpO2:  [94 %-97 %] 94 %  BP: (110-134)/(61-82) 130/82     Weight: 56.1 kg (123 lb 10.9 oz)  Body mass index is 19.96 kg/m².    Intake/Output - Last 3 Shifts       03/01 0700 - 03/02 0659 03/02 0700 - 03/03 0659 03/03 0700 - 03/04 0659    P.O. 560 630 270    I.V. (mL/kg)       Blood       Other  600     IV Piggyback 100 350     TPN 1537.3 1037.1     Total Intake(mL/kg) 2197.3 (38.2) 2617.1 (46.6) 270 (4.8)    Urine (mL/kg/hr)   100 (0.2)    Emesis/NG output   0 (0)    Other  2600 (1.9) 0 (0)    Stool  0 (0) 0 (0)    Blood   0 (0)    Total Output   2600 100    Net +2197.3 +17.1 +170           Urine Occurrence 0 x 0 x 0 x    Stool Occurrence 5 x 4 x 0 x    Emesis Occurrence   0 x          Physical Exam   Constitutional: He is oriented to person, place, and time. He appears well-developed. No distress.   Temporal and distal extremity muscle wasting   HENT:    Head: Normocephalic and atraumatic.   Mouth/Throat: No oropharyngeal exudate.   dylan tube   Eyes: EOM are normal. Pupils are equal, round, and reactive to light. Scleral icterus is present.   Neck: Normal range of motion. Neck supple. No JVD present. No thyromegaly present.   Cardiovascular: Normal rate, regular rhythm, normal heart sounds and intact distal pulses.    No murmur heard.  Pulmonary/Chest: Effort normal. No respiratory distress. He has decreased breath sounds in the right middle field, the right lower field and the left lower field. He has no wheezes. He exhibits no tenderness.   Diminished to RLL   Abdominal: Soft. Bowel sounds are normal. He exhibits distension and ascites. There is tenderness. There is no rebound and no guarding.   Dressing to chevron.  Wd vac removed 1/12/18  Dependent edema present R flank; surrounding erythema   Musculoskeletal: Normal range of motion. He exhibits edema (2+ LE edema). He exhibits no tenderness.   Neurological: He is alert and oriented to person, place, and time. He has normal reflexes.   Skin: Skin is warm and dry. Capillary refill takes 2 to 3 seconds. He is not diaphoretic. No erythema.   jaundice   Psychiatric: He has a normal mood and affect. His behavior is normal. Judgment and thought content normal. His mood appears not anxious.   Depressed mood   Nursing note and vitals reviewed.      Laboratory:  Immunosuppressants         Stop Route Frequency     cycloSPORINE modified capsule 175 mg      -- Oral 2 times daily        CBC:     Recent Labs  Lab 03/03/18  0500   WBC 5.34   RBC 2.43*   HGB 7.5*   HCT 23.2*      MCV 96   MCH 30.9   MCHC 32.3     CMP:     Recent Labs  Lab 03/03/18  0500   GLU 87   CALCIUM 8.6*   ALBUMIN 1.7*   PROT 5.3*      K 4.0   CO2 25      BUN 24*   CREATININE 2.8*   ALKPHOS 127   ALT <5*   AST 39   BILITOT 1.5*     Labs within the past 24 hours have been reviewed.    Diagnostic Results:  pertinent imaging  reviewed    Assessment/Plan:     Seizure    -Patient with stuttering speech, trouble recalling specific words when speaking. Does have hx of seizure (on keppra) previously while on prograf. Switched to prograf over the weekend from cyclosporine. Neuro exam unremarkable.   -CT head without contrast obtained, unremarkable.   -In light of these symptoms, prograf d/c, restart cyclosporine 3/1.  - remains w/o seizure.  Cont to have stutter.            Liver transplanted    - Post op course complicated by fevers and hyperbilirubinemia.  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  - Liver US 1/8 showed 3.9 cm complex fluid collection anterior to right lobe and moderate nonspecific complex ascites inferior to transplant.   - IR placed drain 1/11/18, cell count negative for infection.  - PBS consulted. ERCP 1/17 with stone and sludge. Pt placed on Actigall - treated with 10 day course of abx.   - Bili elevated - liver u/s 1/28  - Liver biopsy 1/23 without rejection.  - consulted PBS for ERCP - Choledocholithiasis was found along with sludge, stents placed 1/30.  - ERCP 2/19, overall unremarkable findings. Did place new larger stents. Will need repeat ERCP in 10 weeks. Bilirubin improving.  - Liver US reviewed w mod ascites.  Stent in CBD.          Prophylactic immunotherapy    - See long term use of immunosuppression.         Long-term use of immunosuppressant medication    - Maintenance IS with cyclosporine -->transitioned to Prograf 2/24 for continued nausea. However now with neurological symptoms with prograf and hx of seizure, prograf d/c and cyclosporine restarted 3/1.  - MMF on hold for infections and neutropenia.   - Continue to check cyclosporine and Prograf level daily during transition. Assess for toxicity and adjust level as needed.        At risk for opportunistic infections    - CMV detected 2/1 now on tx dose valcyte. CMV PCR 2/15 not detected. CMV PCR 2/22, negative   -Repeat CMV PCR 3/1  pending.   - Pentam given 2/24            Other neutropenia    - Neupogen given 2/25  -Neupogen given 3/1.          Other cytomegaloviral diseases    - detected at 370 on CMV PCR 2/1.  - case discussed with ID and will hold off on treatment at this time given low WBC.   - undetected CMV PCR 2/8.  - continue Valcyte 200 mg every Mon, Wed, Fri.  - CMV PCR 2/15 negative  - Per ID, valcyte dose decreased to ppx dose on 2/18.  - CMV PCR 2/22 negative  - Repeat scope possibly Monday as pt with leukopenia, h/o CMV, and burning pain despite negative PCR        Kidney transplant candidate              Fever    - Started on vanc/cefepime for ERCP. Now transitioned to vanc/zosyn.  - Blood cultures sent, prelim NGTD. Repeat blood cultures and chest xray today 2/10.  - thoracentesis 2/1 - fluid negative for infection.  - atelectasis noted on ct scan - start breathing tx & CPT.  - now cmv positive - started treatment 2/5/18.  - taken to OR 2/7 for exp lap - fluid collections drained, cultures pending.  - Patient with low grade fever 2/15, 100.3.  -CT A/P 2/15 with no evidence of IA fluid infection. Did show R pleural effusion s/p thoracentesis, cell count negative for infection, cx pending.  -Repeat blood cx 2/15 prelim NGTD, CMV PCR 2/15 negative   Complaining of abdominal pain. Is tender in RUQ.  -Remains with fevers  - Bilirubin remains elevated. Concerning for cholangitis. Antibiotics broadened 2/17. ID re consulted.   - ERCP  2/19, overall unremarkable findings. Did place new larger stents. Will need repeat ERCP in 10 weeks. Bilirubin is stable post ERCP.  -Tmax 101 2/21 PM again ID following. Plan to transition vanc/zosyn to ertapenem 2/22 as no improvement noted.   - Erta started 2/22.  -CT A/P obtained 2/26 with focal/loculated region of fluid in the lower right abdomen measuring approximately 8.7 x 5.5 cm.   -IR attempted drainage on fluid collection on 2/27, however, per IR,there was limited fluid interposed with bowel  in the RLQ with no safe pocket seen for drainage.  -Tagged WBC scan 2/27, negative results.  -Remains with low grade fevers.   -Per transplant attending, will hold off on EGD at this time as concern anesthesia will put patient at further increase risk for seizure activity. Will plan for potenial EGD on Monday. Discussed with GI, appreciate their assistance.  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 EBV PCR negative. Adenovirus pending. Repeat CMV PCR pending.   -Back pain today along with worsening abdominal pain improved.   4 BMs after enema.  Tmax 99.2. ID following. Cont Ertapenem.           Constipation    - Enema ordered 1/20 and 1/21 with BM.  - Encourage ambulation.  - decrease narcotics.  - d/c metamucil and miralax bid as makes patient nauseated.  - increase reglan qid.  - small bowel follow through with normal findings.   - patient reports last BM 2/19   -schedule bisacodyl  -KUB 2/21 with significant amounts of stool and gas. Pt declines miralax, metamucil, colace.   -encouraged suppository.  - Improved on KUB 2/23  - Pt had 4 BMs overnight.        Other ascites    - Paracentesis performed 1/11/18 with 1600 ml removed.    - fluid negative for infection.   - Repeat para 1/19 negative for infection per cell count.  - no fluid seen for paracentesis on 1/23.  - Plan to re attempt paracentesis next week to assess for peritonitis.         Severe protein-calorie malnutrition    - Poor PO intake since transplant requiring short course of TPN during previous hospital stay.   - albumin remains decreased but appetite slowly improving per pt.   - Dietary consulted. Will need to closely monitor PO intake.   - supplements also ordered.   - SHANE with tube feeding start 1/16. Stopped 1/17/18, was not tolerating. [place] removed 1/20.  - prealbumin 7 on 1/15.  - TPN started 1/16/18.   - Pt able to eat minimally 1/21 and 1/22 which is an improvement.  - gi consulted - EGD reviewed, increased ppi bid, miralax bid, and  metamucil bid, decreased narcotics.  - continue reglan, placed SHANE tube with tube feeds - transitioned to nightly feeds. Not tolerating well due to nausea.   - small bowel follow through with normal findings.   - consulted gi for possible gj tube placement, holding off on this for now as with ongoing fevers.  -Dietary changed tube feeds back to 24 hours from nightly 2/19 as not eating enough during the day to meet caloric needs. TF on hold since 2/20.  - retrial marinol 2/22 as pt reports that's the only thing helping his appetite, in the evening 2/2 concern for possible drowsiness  - pt feels marinol is helping  - Pt vomited 2/23 after trying hamburger  - Start TPN 2/24- continue  - Pt able to 1/4 sandwich without vomiting 2/25.  Not been able to eat anything n more tan 48 hours per pt and mother.            Nausea and vomiting    - h/o constipation, reports having regular BM's with bowel regimen.  - vomiting episode x 1 at home. Anti-emetics ordered PRN.  - KUB repeated 1/14/18 given increased abdominal pain- mild, generalized bowel distention suggesting ileus.  Diet changed to clears as tolerated for bowel rest .  - SHANE tube placed 1/16 for tube feeds. Not tolerating tube feeds, stopped 1/17/18.  - D/C shane 1/19, now in place as of 1/25.  - chronic constipation, Cont bowel regimen.   - See protein calorie malnutrition.  - TF have been on hold since 2/20 for N/V.   - D/C shane 2/23 as not tolerating TF  - 2/24 changes: Increased Reglan to 10 mg, D/C atovaquone, transitioned cyclo to Prograf, change twyla to BID, check Keppra level  - GI consulted. Plan for EGD beginning of next week as with continued nausea/vomiting with po intake. Appreciate GI recs.    - Cont to have nausea and vomiting w eating.  Cont TPN.               Recurrent pleural effusion on right    - CXR in ER with large right pleural effusion with subjective c/o worsening SOB  - thoracentesis completed- 1200 ml removed.  SOB with significant improvement.    - Cell count reviewed and negative for infection.   - resp even and non labored.  O2 sat 94-98%.  - Repeat thora 1/19 negative for infection per cell count.  - Reaccumulating fluid on xray 1/21.  - thoracentesis 1/31 - 1.4L removed, negative for infection  - Reaccumulating fluid again on xray 2/10.   -Thoracentesis performed  2/16 with 1500 cc off, cell count neg for infection, cx no growth to date.   -CT A/P 2/26 with large R pleural effusion, may need repeat thora if fevers reoccur.          Biliary stricture of transplanted liver    - tbili with increase prompting ERCP 1/30 with stones and sludge, stents placed.  -  Repeat ERCP 2/19 as was having recurrent fevers with bilirubin stuck at 2.0-2.2 with overall unremarkable findings seen. Did place new larger stents. Bilirubin now improving. Will need repeat ERCP in 10 weeks.         Anemia of chronic disease    - H/H stable. Cont to monitor with daily cbc.         Delayed surgical wound healing    - wd vac removed 1/12/18.  Wound healing well. Aquacel AG and Mepilex border dressing applied via wound care recs.           Acute renal failure with tubular necrosis    - HD resumed on previous admission. Now anuric and dialyzes M-W-F.  - Nephrology following.    - ktm consulted for possible kidney transplant.            VTE Risk Mitigation         Ordered     heparin (porcine) injection 1,000 Units  As needed (PRN)     Route:  Intra-Catheter        02/05/18 0945     heparin (porcine) injection 5,000 Units  Every 8 hours     Route:  Subcutaneous        01/31/18 0957     Medium Risk of VTE  Once      01/11/18 0351     Place sequential compression device  Until discontinued      01/11/18 0351          The patients clinical status was discussed at multidisplinary rounds, involving transplant surgery, transplant medicine, pharmacy, nursing, nutrition, and social work    Discharge Planning:  Monitor kidney function --> may need outpt HD  Monitor HH needs vs rehab for  deconditioned status      Norma Blackwood NP  Liver Transplant  [facility]

## 2018-03-04 NOTE — SUBJECTIVE & OBJECTIVE
Scheduled Meds:   bisacodyl  10 mg Oral Daily    cycloSPORINE modified  175 mg Oral BID    docusate sodium  100 mg Oral BID    dronabinol  2.5 mg Oral Daily    epoetin maurisio (PROCRIT) injection  10,000 Units Intravenous Every Mon, Wed, Fri    ertapenem (INVANZ) IVPB  500 mg Intravenous Q24H    fat emulsion 20%  250 mL Intravenous Once    heparin (porcine)  5,000 Units Subcutaneous Q8H    custom IVPB builder   Intravenous Q24H    levetiracetam oral soln  500 mg Oral BID    levothyroxine  75 mcg Oral Before breakfast    metoclopramide HCl  10 mg Oral QID (AC & HS)    multivitamin  1 tablet Oral Daily    omeprazole  40 mg Oral QAM    polyethylene glycol  17 g Oral BID    psyllium husk (aspartame)  3.4 g Oral BID    ursodiol  300 mg Oral BID     Continuous Infusions:   TPN ADULT CENTRAL LINE CUSTOM 48 mL/hr at 03/03/18 2113    TPN ADULT CENTRAL LINE CUSTOM       PRN Meds:sodium chloride, sodium chloride 0.9%, acetaminophen, albuterol-ipratropium 2.5mg-0.5mg/3mL, bisacodyl, dextrose 50%, dextrose 50%, glucagon (human recombinant), glucose, glucose, heparin (porcine), naloxone, ondansetron, ondansetron, oxyCODONE, prochlorperazine, simethicone, sodium chloride 0.9%    Review of Systems   Constitutional: Positive for activity change, appetite change, fatigue and fever. Negative for chills.   HENT: Negative.  Negative for congestion and facial swelling.    Eyes: Negative for pain, discharge and visual disturbance.   Respiratory: Negative for cough, chest tightness, shortness of breath and wheezing.    Cardiovascular: Negative for chest pain, palpitations and leg swelling.   Gastrointestinal: Positive for abdominal distention, abdominal pain and nausea. Negative for constipation.   Endocrine: Negative.    Genitourinary: Positive for decreased urine volume. Negative for difficulty urinating.        Anuric   Musculoskeletal: Positive for back pain. Negative for arthralgias and myalgias.   Skin: Positive for  color change and wound. Negative for rash.   Allergic/Immunologic: Positive for immunocompromised state.   Neurological: Positive for weakness. Negative for dizziness, seizures and headaches.   Psychiatric/Behavioral: Positive for decreased concentration and dysphoric mood. Negative for confusion. The patient is not nervous/anxious.    All other systems reviewed and are negative.    Objective:     Vital Signs (Most Recent):  Temp: 98.9 °F (37.2 °C) (03/04/18 1141)  Pulse: 79 (03/04/18 1141)  Resp: 18 (03/04/18 1141)  BP: 125/76 (03/04/18 1141)  SpO2: 96 % (03/04/18 1141) Vital Signs (24h Range):  Temp:  [98.7 °F (37.1 °C)-99.5 °F (37.5 °C)] 98.9 °F (37.2 °C)  Pulse:  [79-93] 79  Resp:  [16-18] 18  SpO2:  [94 %-97 %] 96 %  BP: (125-136)/(74-83) 125/76     Weight: 56.1 kg (123 lb 10.9 oz)  Body mass index is 19.96 kg/m².    Intake/Output - Last 3 Shifts       03/02 0700 - 03/03 0659 03/03 0700 - 03/04 0659 03/04 0700 - 03/05 0659    P.O. 630 480     Other 600      IV Piggyback 350 350     TPN 1037.1 647.4     Total Intake(mL/kg) 2617.1 (46.6) 1477.4 (26.3)     Urine (mL/kg/hr)  100 (0.1)     Emesis/NG output  0 (0)     Other 2600 (1.9) 0 (0)     Stool 0 (0) 0 (0)     Blood  0 (0)     Total Output 2600 100      Net +17.1 +1377.4             Urine Occurrence 0 x 0 x     Stool Occurrence 4 x 1 x     Emesis Occurrence  0 x           Physical Exam   Constitutional: He is oriented to person, place, and time. He appears well-developed. No distress.   Temporal and distal extremity muscle wasting   HENT:   Head: Normocephalic and atraumatic.   Mouth/Throat: No oropharyngeal exudate.   dylan tube   Eyes: EOM are normal. Pupils are equal, round, and reactive to light. Scleral icterus is present.   Neck: Normal range of motion. Neck supple. No JVD present. No thyromegaly present.   Cardiovascular: Normal rate, regular rhythm, normal heart sounds and intact distal pulses.    No murmur heard.  Pulmonary/Chest: Effort normal. No  respiratory distress. He has decreased breath sounds in the right middle field, the right lower field and the left lower field. He has no wheezes. He exhibits no tenderness.   Diminished to RLL   Abdominal: Soft. Bowel sounds are normal. He exhibits distension and ascites. There is tenderness. There is no rebound and no guarding.   Dressing to chevron.  Wd vac removed 1/12/18  Dependent edema present R flank; surrounding erythema   Musculoskeletal: Normal range of motion. He exhibits edema (2+ LE edema). He exhibits no tenderness.   Neurological: He is alert and oriented to person, place, and time. He has normal reflexes.   Skin: Skin is warm and dry. Capillary refill takes 2 to 3 seconds. He is not diaphoretic. No erythema.   jaundice   Psychiatric: He has a normal mood and affect. His behavior is normal. Judgment and thought content normal. His mood appears not anxious.   Depressed mood   Nursing note and vitals reviewed.      Laboratory:  Immunosuppressants         Stop Route Frequency     cycloSPORINE modified capsule 175 mg      -- Oral 2 times daily        CBC:     Recent Labs  Lab 03/04/18  0500   WBC 6.74   RBC 2.35*   HGB 7.3*   HCT 22.2*      MCV 95   MCH 31.1*   MCHC 32.9     CMP:     Recent Labs  Lab 03/04/18  0500   GLU 90   CALCIUM 9.2   ALBUMIN 1.8*   PROT 5.3*      K 3.9   CO2 22*      BUN 42*   CREATININE 3.8*   ALKPHOS 123   ALT 5*   AST 42*   BILITOT 1.7*     Labs within the past 24 hours have been reviewed.    Diagnostic Results:  pertinent imaging reviewed

## 2018-03-04 NOTE — ASSESSMENT & PLAN NOTE
- Post op course complicated by fevers and hyperbilirubinemia.  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  - Liver US 1/8 showed 3.9 cm complex fluid collection anterior to right lobe and moderate nonspecific complex ascites inferior to transplant.   - IR placed drain 1/11/18, cell count negative for infection.  - PBS consulted. ERCP 1/17 with stone and sludge. Pt placed on Actigall - treated with 10 day course of abx.   - Bili elevated - liver u/s 1/28  - Liver biopsy 1/23 without rejection.  - consulted PBS for ERCP - Choledocholithiasis was found along with sludge, stents placed 1/30.  - ERCP 2/19, overall unremarkable findings. Did place new larger stents. Will need repeat ERCP in 10 weeks. Bilirubin improving.  - Liver US reviewed w mod ascites.  Stent in CBD.

## 2018-03-04 NOTE — ASSESSMENT & PLAN NOTE
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 partial SBO  - Immunosuppression on prophylaxis - pentamidine, isavuconazole    Additional infectious evaluation has been negative (adeno 3/1, aspergillus 2/1, CMV 3/1, EBV 2/28, Indium scan 2/28)    Recommendation:  - Continue ertapenem, renally dosed - started 2/22 - complete 14 day course - may need to discontinue sooner if any new neurological findings as can lower seizure threshold  - EGD planned - send pathology for CMV, bacterial, AFB, fungal stains  - Repeat paracentesis planned for persistent moderate volume ascites - send for cell count with diff, bacterial/fungal/AFB stain and cultures

## 2018-03-05 ENCOUNTER — ANESTHESIA EVENT (OUTPATIENT)
Dept: ENDOSCOPY | Facility: HOSPITAL | Age: 28
DRG: 628 | End: 2018-03-05
Payer: COMMERCIAL

## 2018-03-05 LAB
ABO + RH BLD: NORMAL
ALBUMIN SERPL BCP-MCNC: 1.8 G/DL
ALP SERPL-CCNC: 124 U/L
ALT SERPL W/O P-5'-P-CCNC: 6 U/L
ANION GAP SERPL CALC-SCNC: 12 MMOL/L
ANISOCYTOSIS BLD QL SMEAR: SLIGHT
AST SERPL-CCNC: 42 U/L
B19V DNA # SPEC NAA+PROBE: <100 COPIES/ML
BASOPHILS # BLD AUTO: ABNORMAL K/UL
BASOPHILS NFR BLD: 1 %
BILIRUB SERPL-MCNC: 1.6 MG/DL
BLD GP AB SCN CELLS X3 SERPL QL: NORMAL
BLD PROD TYP BPU: NORMAL
BLOOD UNIT EXPIRATION DATE: NORMAL
BLOOD UNIT TYPE CODE: 5100
BLOOD UNIT TYPE: NORMAL
BUN SERPL-MCNC: 59 MG/DL
CALCIUM SERPL-MCNC: 9.5 MG/DL
CHLORIDE SERPL-SCNC: 102 MMOL/L
CO2 SERPL-SCNC: 22 MMOL/L
CODING SYSTEM: NORMAL
CREAT SERPL-MCNC: 4.6 MG/DL
CYCLOSPORINE BLD LC/MS/MS-MCNC: 255 NG/ML
DIFFERENTIAL METHOD: ABNORMAL
DISPENSE STATUS: NORMAL
EOSINOPHIL # BLD AUTO: ABNORMAL K/UL
EOSINOPHIL NFR BLD: 0 %
ERYTHROCYTE [DISTWIDTH] IN BLOOD BY AUTOMATED COUNT: 18.1 %
EST. GFR  (AFRICAN AMERICAN): 18.6 ML/MIN/1.73 M^2
EST. GFR  (NON AFRICAN AMERICAN): 16.1 ML/MIN/1.73 M^2
GLUCOSE SERPL-MCNC: 92 MG/DL
HCT VFR BLD AUTO: 21.4 %
HGB BLD-MCNC: 7 G/DL
IMM GRANULOCYTES # BLD AUTO: ABNORMAL K/UL
IMM GRANULOCYTES NFR BLD AUTO: ABNORMAL %
LYMPHOCYTES # BLD AUTO: ABNORMAL K/UL
LYMPHOCYTES NFR BLD: 11 %
MAGNESIUM SERPL-MCNC: 2.4 MG/DL
MCH RBC QN AUTO: 31 PG
MCHC RBC AUTO-ENTMCNC: 32.7 G/DL
MCV RBC AUTO: 95 FL
METAMYELOCYTES NFR BLD MANUAL: 1 %
MONOCYTES # BLD AUTO: ABNORMAL K/UL
MONOCYTES NFR BLD: 8 %
NEUTROPHILS NFR BLD: 71 %
NEUTS BAND NFR BLD MANUAL: 8 %
NRBC BLD-RTO: 0 /100 WBC
PHOSPHATE SERPL-MCNC: 4.6 MG/DL
PLATELET # BLD AUTO: 161 K/UL
PLATELET BLD QL SMEAR: ABNORMAL
PMV BLD AUTO: 11.4 FL
POLYCHROMASIA BLD QL SMEAR: ABNORMAL
POTASSIUM SERPL-SCNC: 4.2 MMOL/L
PREALB SERPL-MCNC: 9 MG/DL
PROT SERPL-MCNC: 5.3 G/DL
RBC # BLD AUTO: 2.26 M/UL
SODIUM SERPL-SCNC: 136 MMOL/L
TRANS ERYTHROCYTES VOL PATIENT: NORMAL ML
WBC # BLD AUTO: 7.92 K/UL

## 2018-03-05 PROCEDURE — 80158 DRUG ASSAY CYCLOSPORINE: CPT

## 2018-03-05 PROCEDURE — 85007 BL SMEAR W/DIFF WBC COUNT: CPT

## 2018-03-05 PROCEDURE — 99233 SBSQ HOSP IP/OBS HIGH 50: CPT | Mod: ,,, | Performed by: NURSE PRACTITIONER

## 2018-03-05 PROCEDURE — 85027 COMPLETE CBC AUTOMATED: CPT

## 2018-03-05 PROCEDURE — 25000003 PHARM REV CODE 250: Performed by: PHYSICIAN ASSISTANT

## 2018-03-05 PROCEDURE — 63600175 PHARM REV CODE 636 W HCPCS: Performed by: NURSE PRACTITIONER

## 2018-03-05 PROCEDURE — 90935 HEMODIALYSIS ONE EVALUATION: CPT

## 2018-03-05 PROCEDURE — 84134 ASSAY OF PREALBUMIN: CPT

## 2018-03-05 PROCEDURE — 25000003 PHARM REV CODE 250: Performed by: NURSE PRACTITIONER

## 2018-03-05 PROCEDURE — 20600001 HC STEP DOWN PRIVATE ROOM

## 2018-03-05 PROCEDURE — 97162 PT EVAL MOD COMPLEX 30 MIN: CPT

## 2018-03-05 PROCEDURE — 63600175 PHARM REV CODE 636 W HCPCS: Mod: JG | Performed by: SURGERY

## 2018-03-05 PROCEDURE — 83735 ASSAY OF MAGNESIUM: CPT

## 2018-03-05 PROCEDURE — P9021 RED BLOOD CELLS UNIT: HCPCS

## 2018-03-05 PROCEDURE — 63600175 PHARM REV CODE 636 W HCPCS: Performed by: PHYSICIAN ASSISTANT

## 2018-03-05 PROCEDURE — 84100 ASSAY OF PHOSPHORUS: CPT

## 2018-03-05 PROCEDURE — A4217 STERILE WATER/SALINE, 500 ML: HCPCS | Performed by: NURSE PRACTITIONER

## 2018-03-05 PROCEDURE — 86920 COMPATIBILITY TEST SPIN: CPT

## 2018-03-05 PROCEDURE — B4185 PARENTERAL SOL 10 GM LIPIDS: HCPCS | Performed by: NURSE PRACTITIONER

## 2018-03-05 PROCEDURE — 86850 RBC ANTIBODY SCREEN: CPT

## 2018-03-05 PROCEDURE — 80053 COMPREHEN METABOLIC PANEL: CPT

## 2018-03-05 PROCEDURE — 25000003 PHARM REV CODE 250: Performed by: SURGERY

## 2018-03-05 PROCEDURE — 36430 TRANSFUSION BLD/BLD COMPNT: CPT

## 2018-03-05 PROCEDURE — 90935 HEMODIALYSIS ONE EVALUATION: CPT | Mod: ,,, | Performed by: INTERNAL MEDICINE

## 2018-03-05 PROCEDURE — 25000003 PHARM REV CODE 250: Performed by: INTERNAL MEDICINE

## 2018-03-05 PROCEDURE — 96372 THER/PROPH/DIAG INJ SC/IM: CPT

## 2018-03-05 RX ORDER — OXYCODONE HYDROCHLORIDE 5 MG/1
5 TABLET ORAL ONCE
Status: COMPLETED | OUTPATIENT
Start: 2018-03-05 | End: 2018-03-05

## 2018-03-05 RX ORDER — SYRING-NEEDL,DISP,INSUL,0.3 ML 29 G X1/2"
296 SYRINGE, EMPTY DISPOSABLE MISCELLANEOUS
Status: COMPLETED | OUTPATIENT
Start: 2018-03-05 | End: 2018-03-05

## 2018-03-05 RX ORDER — HYDROCODONE BITARTRATE AND ACETAMINOPHEN 500; 5 MG/1; MG/1
TABLET ORAL
Status: DISCONTINUED | OUTPATIENT
Start: 2018-03-05 | End: 2018-03-18

## 2018-03-05 RX ORDER — PSEUDOEPHEDRINE/ACETAMINOPHEN 30MG-500MG
100 TABLET ORAL
Status: COMPLETED | OUTPATIENT
Start: 2018-03-05 | End: 2018-03-05

## 2018-03-05 RX ADMIN — METOCLOPRAMIDE HYDROCHLORIDE 10 MG: 5 SOLUTION ORAL at 08:03

## 2018-03-05 RX ADMIN — OXYCODONE HYDROCHLORIDE 10 MG: 5 TABLET ORAL at 03:03

## 2018-03-05 RX ADMIN — MAGNESIUM CITRATE 296 ML: 1.75 LIQUID ORAL at 11:03

## 2018-03-05 RX ADMIN — HEPARIN SODIUM 1000 UNITS: 1000 INJECTION, SOLUTION INTRAVENOUS; SUBCUTANEOUS at 11:03

## 2018-03-05 RX ADMIN — Medication 100 ML: at 11:03

## 2018-03-05 RX ADMIN — DRONABINOL 2.5 MG: 2.5 CAPSULE ORAL at 05:03

## 2018-03-05 RX ADMIN — SODIUM CHLORIDE 0.5 G: 0.9 INJECTION, SOLUTION INTRAVENOUS at 08:03

## 2018-03-05 RX ADMIN — SODIUM CHLORIDE: 234 INJECTION INTRAMUSCULAR; INTRAVENOUS; SUBCUTANEOUS at 08:03

## 2018-03-05 RX ADMIN — ONDANSETRON HYDROCHLORIDE 4 MG: 2 INJECTION, SOLUTION INTRAMUSCULAR; INTRAVENOUS at 12:03

## 2018-03-05 RX ADMIN — METOCLOPRAMIDE HYDROCHLORIDE 10 MG: 5 SOLUTION ORAL at 05:03

## 2018-03-05 RX ADMIN — LEVETIRACETAM 500 MG: 100 SOLUTION ORAL at 08:03

## 2018-03-05 RX ADMIN — ERYTHROPOIETIN 10000 UNITS: 10000 INJECTION, SOLUTION INTRAVENOUS; SUBCUTANEOUS at 09:03

## 2018-03-05 RX ADMIN — OXYCODONE HYDROCHLORIDE 10 MG: 5 TABLET ORAL at 04:03

## 2018-03-05 RX ADMIN — URSODIOL 300 MG: 300 CAPSULE ORAL at 08:03

## 2018-03-05 RX ADMIN — OXYCODONE HYDROCHLORIDE 5 MG: 5 TABLET ORAL at 01:03

## 2018-03-05 RX ADMIN — OXYCODONE HYDROCHLORIDE 10 MG: 5 TABLET ORAL at 09:03

## 2018-03-05 RX ADMIN — LEVETIRACETAM 500 MG: 100 SOLUTION ORAL at 01:03

## 2018-03-05 RX ADMIN — OXYCODONE HYDROCHLORIDE 10 MG: 5 TABLET ORAL at 11:03

## 2018-03-05 RX ADMIN — SODIUM CHLORIDE 300 ML: 0.9 INJECTION, SOLUTION INTRAVENOUS at 09:03

## 2018-03-05 RX ADMIN — LEVOTHYROXINE SODIUM 75 MCG: 75 TABLET ORAL at 06:03

## 2018-03-05 RX ADMIN — SODIUM CHLORIDE: 9 INJECTION, SOLUTION INTRAVENOUS at 12:03

## 2018-03-05 RX ADMIN — METOCLOPRAMIDE HYDROCHLORIDE 10 MG: 5 SOLUTION ORAL at 06:03

## 2018-03-05 RX ADMIN — SOYBEAN OIL 250 ML: 20 INJECTION, SOLUTION INTRAVENOUS at 08:03

## 2018-03-05 RX ADMIN — CYCLOSPORINE 175 MG: 100 CAPSULE, LIQUID FILLED ORAL at 05:03

## 2018-03-05 RX ADMIN — CYCLOSPORINE 175 MG: 100 CAPSULE, LIQUID FILLED ORAL at 06:03

## 2018-03-05 NOTE — SUBJECTIVE & OBJECTIVE
Scheduled Meds:   bisacodyl  10 mg Oral Daily    cycloSPORINE modified  175 mg Oral BID    docusate sodium  100 mg Oral BID    dronabinol  2.5 mg Oral Daily    epoetin maurisio (PROCRIT) injection  10,000 Units Intravenous Every Mon, Wed, Fri    ertapenem (INVANZ) IVPB  500 mg Intravenous Q24H    fat emulsion 20%  250 mL Intravenous Once    heparin (porcine)  5,000 Units Subcutaneous Q8H    custom IVPB builder   Intravenous Q24H    levetiracetam oral soln  500 mg Oral BID    levothyroxine  75 mcg Oral Before breakfast    metoclopramide HCl  10 mg Oral QID (AC & HS)    multivitamin  1 tablet Oral Daily    omeprazole  40 mg Oral QAM    polyethylene glycol  17 g Oral BID    psyllium husk (aspartame)  3.4 g Oral BID    ursodiol  300 mg Oral BID     Continuous Infusions:   TPN ADULT CENTRAL LINE CUSTOM 48 mL/hr at 03/04/18 2104    TPN ADULT CENTRAL LINE CUSTOM       PRN Meds:sodium chloride, acetaminophen, albuterol-ipratropium 2.5mg-0.5mg/3mL, bisacodyl, dextrose 50%, dextrose 50%, glucagon (human recombinant), glucose, glucose, heparin (porcine), naloxone, ondansetron, ondansetron, oxyCODONE, prochlorperazine, simethicone, sodium chloride 0.9%    Review of Systems   Constitutional: Positive for activity change, appetite change, fatigue and fever. Negative for chills.   HENT: Negative.  Negative for congestion and facial swelling.    Eyes: Negative for pain, discharge and visual disturbance.   Respiratory: Negative for cough, chest tightness, shortness of breath and wheezing.    Cardiovascular: Negative for chest pain, palpitations and leg swelling.   Gastrointestinal: Positive for abdominal distention, abdominal pain and nausea. Negative for constipation.   Endocrine: Negative.    Genitourinary: Positive for decreased urine volume. Negative for difficulty urinating.        Anuric   Musculoskeletal: Positive for back pain. Negative for arthralgias and myalgias.   Skin: Positive for color change and wound.  Negative for rash.   Allergic/Immunologic: Positive for immunocompromised state.   Neurological: Positive for weakness. Negative for dizziness, seizures and headaches.   Psychiatric/Behavioral: Positive for decreased concentration and dysphoric mood. Negative for confusion. The patient is not nervous/anxious.    All other systems reviewed and are negative.    Objective:     Vital Signs (Most Recent):  Temp: 98.9 °F (37.2 °C) (03/05/18 1219)  Pulse: 82 (03/05/18 1219)  Resp: 18 (03/05/18 1219)  BP: 124/69 (03/05/18 1219)  SpO2: 95 % (03/05/18 1219) Vital Signs (24h Range):  Temp:  [98.2 °F (36.8 °C)-99.1 °F (37.3 °C)] 98.9 °F (37.2 °C)  Pulse:  [79-90] 82  Resp:  [15-18] 18  SpO2:  [95 %-96 %] 95 %  BP: (116-139)/(65-84) 124/69     Weight: 56.1 kg (123 lb 10.9 oz)  Body mass index is 19.96 kg/m².    Intake/Output - Last 3 Shifts       03/03 0700 - 03/04 0659 03/04 0700 - 03/05 0659 03/05 0700 - 03/06 0659    P.O. 480 300     Blood   308    Other   958    IV Piggyback 350      .4      Total Intake(mL/kg) 1477.4 (26.3) 300 (5.3) 1266 (22.6)    Urine (mL/kg/hr) 100 (0.1) 275 (0.2)     Emesis/NG output 0 (0) 0 (0)     Other 0 (0) 0 (0) 3476 (7.9)    Stool 0 (0) 0 (0) 0 (0)    Blood 0 (0) 0 (0)     Total Output     Net +1377.4 +25 -2210           Urine Occurrence 0 x 0 x     Stool Occurrence 1 x 0 x 0 x    Emesis Occurrence 0 x 0 x           Physical Exam   Constitutional: He is oriented to person, place, and time. He appears well-developed. No distress.   Temporal and distal extremity muscle wasting   HENT:   Head: Normocephalic and atraumatic.   Mouth/Throat: No oropharyngeal exudate.   dylan tube   Eyes: EOM are normal. Pupils are equal, round, and reactive to light. Scleral icterus is present.   Neck: Normal range of motion. Neck supple. No JVD present. No thyromegaly present.   Cardiovascular: Normal rate, regular rhythm, normal heart sounds and intact distal pulses.    No murmur  heard.  Pulmonary/Chest: Effort normal. No respiratory distress. He has decreased breath sounds in the right middle field, the right lower field and the left lower field. He has no wheezes. He exhibits no tenderness.   Diminished to RLL   Abdominal: Soft. Bowel sounds are normal. He exhibits distension and ascites. There is tenderness. There is no rebound and no guarding.   Dressing to chevron.  Wd vac removed 1/12/18  Dependent edema present R flank; surrounding erythema   Musculoskeletal: Normal range of motion. He exhibits edema (2+ LE edema). He exhibits no tenderness.   Neurological: He is alert and oriented to person, place, and time. He has normal reflexes.   Skin: Skin is warm and dry. Capillary refill takes 2 to 3 seconds. He is not diaphoretic. No erythema.   jaundice   Psychiatric: He has a normal mood and affect. His behavior is normal. Judgment and thought content normal. His mood appears not anxious.   Depressed mood   Nursing note and vitals reviewed.      Laboratory:  Immunosuppressants         Stop Route Frequency     cycloSPORINE modified capsule 175 mg      -- Oral 2 times daily        CBC:   Recent Labs  Lab 03/05/18  0617   WBC 7.92   RBC 2.26*   HGB 7.0*   HCT 21.4*      MCV 95   MCH 31.0   MCHC 32.7     CMP:   Recent Labs  Lab 03/05/18  0617   GLU 92   CALCIUM 9.5   ALBUMIN 1.8*   PROT 5.3*      K 4.2   CO2 22*      BUN 59*   CREATININE 4.6*   ALKPHOS 124   ALT 6*   AST 42*   BILITOT 1.6*     Coagulation: No results for input(s): PT, INR, APTT in the last 168 hours.  Labs within the past 24 hours have been reviewed.    Diagnostic Results:  None

## 2018-03-05 NOTE — PROGRESS NOTES
[facility]  Liver Transplant  Progress Note    Patient Name: Jhonny Diana  MRN: [id number]  Admission Date: 2018  Hospital Length of Stay: 53 days  Code Status: Full Code  Primary Care Provider: Primary Doctor No  Post-Operative Day: 137    ORGAN:   LIVER  Disease Etiology: Acute Alcoholic Hepatitis  Donor Type:    - Brain Death  CDC High Risk:   No  Donor CMV Status:   Donor CMV Status: Positive  Donor HBcAB:   Negative  Donor HCV Status:   Negative  Whole or Partial: Whole Liver  Biliary Anastomosis: End to End  Arterial Anatomy: Standard  Subjective:     History of Present Illness:  Jhonny Diana is a 26 y/o male with past medical history of alcoholic cirrhosis.  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 Found to have peritonitis in November (WBC 5000, 75% PNM) neg for bile leak. He was treated initially with vanc/cefepime. Repeat cell counts  with some improvement (WBC 1400, 45% PNM). He has undergone multiple paracenteses as well as abscess drainage of perihepatic fluid collections and treated with antimicrobial therapy but no positive cultures. Of note, biliary stricture also identified and ERCP performed on 2017 with sphincterotomy and biliary stent placed. Liver tests still have not normalized despite intervention, bilirubin and AP remain elevated. Other pertinent PMH current wound vac in place 2/2 wound infection, malnutrition requiring TPN for short course and ongoing hypoalbuminemia, and seizure activity while on prograf and has since been switched to cyclosporine without reoccurrence.  He presented to the ER for fever, abdominal pain, and N/V. He reports fever (103) for 1  day prior. Overnight, he developed N/V, reports small amount of green emesis with new left sided pain. He also endorses worsening SOB with exertion. He was scheduled as an outpatient for follow up paracentesis and IR drainage of fluid collection. CXR in ER shows large pleural effusion with subsegmental atelectasis. Infectious work up initiated in ER. His ANC is 900. Broad spectrum antibiotics initiated in ED. He denies chest pain, palpitations, diarrhea, constipation, or back pain. Denies any sick contacts.    Hospital Course:  Thoracentesis (1.2L off), Paracentesis (1.6L off), and IR drainage of fluid collection 1/11, all fluids negative for infection. ID consulted. Broad spectrum antibiotics d/c'd 1/15. Chronically malnourished with poor PO intake, prealbumin 7. Shane tube placed 1/15 for tube feedings.     ERCP 1/17 with sludge and a biliary stone which was removed and stent exchanged.  Remained with n/v; therefore, TF remained on hold and TPN continued.   Pt with fever s/p ERCP on 1/17 which continued until 1/19.  Vanc/cefepime restarted.  Blood cx 1/17 and 1/18 NGTD.  ID reconsulted.  Fungal markers sent.  CT C/A/P obtained.  With large R pleural effusion and ascites- both drained 1/19 and negative for infx per cell count. Pt afebrile 1/20. Liver biopsy 1/23 - without rejection.  EGD 1/25 - unremarkable for source of GI symptoms.  Resume diet along with other GI recommendations. SHANE tube placed for tube feeds.       2/22: Pt again spiked temp of 101 overnight, now down to 99. Resent Bcx overnight, prelim NGTD. Transitioned to Erta from vanc/zosyn per ID. Planning for tagged WBC scan on Monday. R sided abdominal erythema/dependent edema improving today.  TF have been on hold since 2/20. Will continue to hold today per pt request. Pt working on diet. KUB yesterday with significant stool and gas. Declines colace, miralax, metamucil, but willing to take daily bisacodyl. Pt agreeable to suppository today. If  suppository not successful can give mag citrate through dylan tonight. Will also retrial Marinol tonight as pt reported improvement in appetite previously. Will monitor closely for sedation.     2/23:  2 small BM 2/22.  Tolerated HD.  [place] removed.  Will attempt to eat this PM.      2/24:  Pt ate then vomited hamburger 2/23.  Will resume TPN.  D/C atovaquone, transition cyclo to Prograf, check Keppra level, increase Reglan to 0 mg, change twyla to BID for nausea.  Will give pentam today.    2/25:  TPN going well, pt ate 1/2 subway sandwhich without vomiting yesterday, will continue to monitor.  Plan to repeat EGD this week.  Pt with drop in H/h again and appears to be hemolyzing.  LDH elevated, hapto slightly low, LEE pending.  With neutropenia- Neupogen given.    2/26:  Pt with severe back pain s/p HD. Unclear if secondary to neupogen.  Repeat labs stable. Indium scan scheduled for 2/27- injection done today (back pain started prior). CT A/P obtained with fluid collection seen in pelvis.    2/27: Patient spiking fevers, 101. Indium scan, negative results. CT A/P obtained 2/26 with focal/loculated region of fluid in the lower right abdomen measuring approximately 8.7 x 5.5 cm. IR attempted drainage, however, per IR,there was  limited fluid interposed with bowel in the RLQ with no safe pocket seen for drainage.    2/28: Remains with low grade fevers. Neupogen given.     3/1: Patient with stuttering speech, trouble recalling specific words when speaking. Does have hx of seizure (on keppra) previously while on prograf. Switched to prograf 2/24 from cyclosporine. Neuro exam unremarkable. CT head without contrast obtained, unremarkable. In light of these symptoms, prograf d/c, cyclosporine restarted 3/1. Per transplant attending, held off on EGD at this time as concern anesthesia would put patient at further increase risk for seizure activity.     3/4: pt walked in room yesterday.  Had 1 BM.  No appetite still.  Cont TPN.   He reports swelling and back pain today.  He vomited after taking Oxy PO.  Fentanyl 12.5mg IV ordered.  Patient rested well afterward.  Remains afebrile.   ID following.   Cont Ertapenem.  Repeat blood and urine cx given continued malaise and stutter- blood cx prelim NGTD.  UA ok.  Urine cx pending.    Tentative plan for EGD on Monday to assess source of fevers/nausea/abdominal pain w bx for CMV and GVHD.  Will also reassess rather able to perform paracentesis.     3/5: no acute events overnight. Pt still not feeling well and with poor sleep overnight. Cont to have mumbling of speech at times but slightly better than before. Remains with abdominal pain but relieved with PO pain meds. Tolerated HD well today. Will plan for paracentesis and EGD tomorrow to assess for peritonitis and cause of persistent N/V respectively.     No new subjective & objective note has been filed under this hospital service since the last note was generated.    Assessment/Plan:     Biliary stricture of transplanted liver    - tbili with increase prompting ERCP 1/30 with stones and sludge, stents placed.  -  Repeat ERCP 2/19 as was having recurrent fevers with bilirubin stuck at 2.0-2.2 with overall unremarkable findings seen. Did place new larger stents. Bilirubin now improving. Will need repeat ERCP in 10 weeks.   - US 3/2 stent in CBD.        Liver transplanted    - Post op course complicated by fevers and hyperbilirubinemia.  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  - Liver US 1/8 showed 3.9 cm complex fluid collection anterior to right lobe and moderate nonspecific complex ascites inferior to transplant.   - IR placed drain 1/11/18, cell count negative for infection.  - PBS consulted. ERCP 1/17 with stone and sludge. Pt placed on Actigall - treated with 10 day course of abx.   - Bili elevated - liver u/s 1/28  - Liver biopsy 1/23 without rejection.  - consulted PBS for ERCP - Choledocholithiasis was found along  with sludge, stents placed 1/30.  - ERCP 2/19, overall unremarkable findings. Did place new larger stents. Will need repeat ERCP in 10 weeks. Bilirubin improving.  - Liver US reviewed w mod ascites.  Stent in CBD.          Fever    - Started on vanc/cefepime for ERCP. Now transitioned to vanc/zosyn.  - Blood cultures sent, prelim NGTD. Repeat blood cultures and chest xray today 2/10.  - thoracentesis 2/1 - fluid negative for infection.  - atelectasis noted on ct scan - start breathing tx & CPT.  - now cmv positive - started treatment 2/5/18.  - taken to OR 2/7 for exp lap - fluid collections drained, cultures pending.  - Patient with low grade fever 2/15, 100.3.  -CT A/P 2/15 with no evidence of IA fluid infection. Did show R pleural effusion s/p thoracentesis, cell count negative for infection, cx pending.  -Repeat blood cx 2/15 prelim NGTD, CMV PCR 2/15 negative   Complaining of abdominal pain. Is tender in RUQ.  -Remains with fevers  - Bilirubin remains elevated. Concerning for cholangitis. Antibiotics broadened 2/17. ID re consulted.   - ERCP  2/19, overall unremarkable findings. Did place new larger stents. Will need repeat ERCP in 10 weeks. Bilirubin is stable post ERCP.  -Tmax 101 2/21 PM again ID following. Plan to transition vanc/zosyn to ertapenem 2/22 as no improvement noted.   - Erta started 2/22.  -CT A/P obtained 2/26 with focal/loculated region of fluid in the lower right abdomen measuring approximately 8.7 x 5.5 cm.   -IR attempted drainage on fluid collection on 2/27, however, per IR,there was limited fluid interposed with bowel in the RLQ with no safe pocket seen for drainage.  -Tagged WBC scan 2/27, negative results.  -Remains with low grade fevers.   -Per transplant attending, will hold off on EGD at this time as concern anesthesia will put patient at further increase risk for seizure activity. Will plan for potenial EGD on Monday. Discussed with GI, appreciate their assistance.  -Chimerism  studies obtained 3/1 to assess for GVHD. EBV PCR negative. Adenovirus pending. Repeat CMV PCR negative.    -Back pain and abdominal pain persists. Having BMs 3/2 but none since.   - Has remained afebrile. ID following. Cont Ertapenem for now.           Nausea and vomiting    - h/o constipation, reports having regular BM's with bowel regimen.  - vomiting episode x 1 at home. Anti-emetics ordered PRN.  - KUB repeated 1/14/18 given increased abdominal pain- mild, generalized bowel distention suggesting ileus.  Diet changed to clears as tolerated for bowel rest .  - SHANE tube placed 1/16 for tube feeds. Not tolerating tube feeds, stopped 1/17/18.  - D/C shane 1/19, now in place as of 1/25.  - chronic constipation, Cont bowel regimen.   - See protein calorie malnutrition.  - TF have been on hold since 2/20 for N/V. shane d/c'd 2/23 as not tolerating TF  - 2/24 changes: Increased Reglan to 10 mg, D/C atovaquone, transitioned cyclo to Prograf, change twyla to BID, check Keppra level  - GI consulted. Plan for EGD tomorrow as with continued nausea/vomiting with po intake. Appreciate GI recs.    - Cont to have nausea and vomiting w medications and eating.  Cont TPN.               Seizure    -Patient with stuttering speech, trouble recalling specific words when speaking. Does have hx of seizure (on keppra) previously while on prograf. Switched to prograf over the weekend from cyclosporine. Neuro exam unremarkable.   -CT head without contrast obtained, unremarkable.   -In light of these symptoms, prograf d/c, restart cyclosporine 3/1.  - remains w/o seizure.  Cont to have stutter/difficulty finding words.            Severe protein-calorie malnutrition    - Poor PO intake since transplant requiring short course of TPN during previous hospital stay.   - albumin remains decreased but appetite slowly improving per pt.   - Dietary consulted. Will need to closely monitor PO intake.   - supplements also ordered.   - SHANE with tube feeding  start 1/16. Stopped 1/17/18, was not tolerating. [place] removed 1/20.  - prealbumin 7 on 1/15.  - TPN started 1/16/18.   - Pt able to eat minimally 1/21 and 1/22 which is an improvement.  - GI consulted - EGD reviewed, increased ppi bid, miralax bid, and metamucil bid, decreased narcotics.  - continue reglan, placed SHANE tube with tube feeds - transitioned to nightly feeds. Not tolerating well due to nausea.   - small bowel follow through with normal findings.   - consulted gi for possible gj tube placement, holding off on this for now as with ongoing fevers.  -Dietary changed tube feeds back to 24 hours from nightly 2/19 as not eating enough during the day to meet caloric needs. TF on hold since 2/20.  - retrial marinol 2/22 as pt reports that's the only thing helping his appetite, in the evening 2/2 concern for possible drowsiness  - Pt then developed persistent N/V again and TPN started 2/24. Cont TPN for now.   - Pt able to eat 1/4 sandwich without vomiting 2/25.  Not been able to eat anything n more than that in 48 hours per pt and mother.    - GI to perform EGD in am to further assess.         Prophylactic immunotherapy    - See long term use of immunosuppression.         Long-term use of immunosuppressant medication    - Maintenance IS with cyclosporine -->transitioned to Prograf 2/24 for continued nausea. However now with neurological symptoms with prograf and hx of seizure, prograf d/c and cyclosporine restarted 3/1.  - MMF on hold for infections and neutropenia.   - Continue to check cyclosporine and Prograf level daily during transition. Assess for toxicity and adjust level as needed.        At risk for opportunistic infections    - CMV detected 2/1 now on tx dose valcyte. CMV PCR 2/15 not detected. CMV PCR 2/22, negative   -Repeat CMV PCR 3/1 negative.    - Pentam given 2/24            Other neutropenia    - WBC stable today.   - Neupogen last given 3/1.           Other cytomegaloviral diseases    - detected  at 370 on CMV PCR 2/1.  - case discussed with ID and will hold off on treatment at this time given low WBC.   - undetected CMV PCR 2/8.  - continue Valcyte 200 mg every Mon, Wed, Fri.  - CMV PCR 2/15 negative  - Per ID, valcyte dose decreased to ppx dose on 2/18.  - CMV PCR 2/22 negative  - Plan for repeat scope tomorrow to assess persistent N/V and possible CMV causing these symptoms.         Constipation    - Enema ordered 1/20 and 1/21 with BM.  - Encourage ambulation.  - Decrease narcotics.  - D/c metamucil and miralax bid as makes patient nauseated.  - Increase reglan qid.  - Small bowel follow through with normal findings.   - Schedule bisacodyl for bowel regimen.   - KUB 2/21 with significant amounts of stool and gas. Pt declines miralax, metamucil, colace.   -encouraged suppository.  - Improved on KUB 2/23  - Pt moving bowels now but will have to keep aggressive bowel regimen on board.         Other ascites    - Paracentesis performed 1/11/18 with 1600 ml removed.    - fluid negative for infection.   - Repeat para 1/19 negative for infection per cell count.  - no fluid seen for paracentesis on 1/23.  - Plan to re attempt paracentesis tomorrow to assess for peritonitis.         Recurrent pleural effusion on right    - CXR in ER with large right pleural effusion with subjective c/o worsening SOB  - thoracentesis completed- 1200 ml removed.  SOB with significant improvement.   - Cell count reviewed and negative for infection.   - resp even and non labored.  O2 sat 94-98%.  - Repeat thora 1/19 negative for infection per cell count.  - Reaccumulating fluid on xray 1/21.  - thoracentesis 1/31 - 1.4L removed, negative for infection  - Reaccumulating fluid again on xray 2/10.   -Thoracentesis performed  2/16 with 1500 cc off, cell count neg for infection, cx no growth to date.   -CT A/P 2/26 with large R pleural effusion, may need repeat thora if fevers reoccur.          Anemia of chronic disease    - H/H stable.  Cont to monitor with daily cbc.         Delayed surgical wound healing    - wd vac removed 1/12/18.  Wound healing well. Aquacel AG and Mepilex border dressing applied via wound care recs.           Acute renal failure with tubular necrosis    - HD resumed on previous admission. Now anuric and dialyzes M-W-F.  - Nephrology following.    - ktm consulted for possible kidney transplant.            VTE Risk Mitigation         Ordered     heparin (porcine) injection 1,000 Units  As needed (PRN)     Route:  Intra-Catheter        02/05/18 0945     heparin (porcine) injection 5,000 Units  Every 8 hours     Route:  Subcutaneous        01/31/18 0957     Medium Risk of VTE  Once      01/11/18 0351     Place sequential compression device  Until discontinued      01/11/18 0351          The patients clinical status was discussed at multidisplinary rounds, involving transplant surgery, transplant medicine, pharmacy, nursing, nutrition, and social work    Discharge Planning:  Not a candidate for d/c at this time.     Jin Perez, NP  Liver Transplant  [facility]

## 2018-03-05 NOTE — PT/OT/SLP EVAL
Physical Therapy Evaluation    Patient Name:  Jhonny Diana   MRN:  [id number]    Recommendations:     Discharge Recommendations:  nursing facility, skilled   Discharge Equipment Recommendations: none   Barriers to discharge: None    Assessment:     Jhonny Diana is a 27 y.o. male admitted with a medical diagnosis of Fever.  He presents with the following impairments/functional limitations:  weakness, impaired endurance, impaired self care skills, impaired functional mobilty, gait instability, impaired balance, impaired sensation, impaired cognition, impaired coordination, impaired cardiopulmonary response to activity, decreased safety awareness, pain.  Pt presents with a significant PMHx of asthma, alcoholic cirrhosis S/p DDLT 10/19/2017 c/b seizures, ATN requiring HD, superficial wound infection s/p wound vac to chevron incision, and multiple admissions for fevers and abdominal pain with RLQ loculation. Patient is currently undergoing extensive work up. Underwent EGD on 3/1/18.  Pt has had a prolonged and complicated hospital stay since 1/11/18.  Currently had a change in mental status and functional ability, most likely associated with a change to medications.  Currently unsafe with out of bed activity for the past week, noticeable dysarthria.  According to mother, 2 weeks prior, pt was independent with all functional mobility, able to amb in the hospital followed by w/c.  Current recommendation for pt to receive skilled PT services in the SNF setting upon discharge.  Pt will benefit from acute skilled PT services to address these deficits and reach maximum level of function.      Recent Surgery: Procedure(s) (LRB):  ERCP (N/A) 14 Days Post-Op    Plan:     During this hospitalization, patient to be seen 4 x/week to address the above listed problems via gait training, therapeutic activities, therapeutic exercises, neuromuscular re-education  · Plan of Care Expires:  04/05/18   Plan of Care Reviewed with: patient,  "mother    Subjective     Communicated with nursing prior to session.  Patient found in supine upon PT entry to room, agreeable to evaluation.      "Back to the front." - referring to pain  "When they started that Prograf things went downhill real quick." - mother    Chief Complaint: Abdominal pain  Patient comments/goals: To regain independence  Pain/Comfort:  · Pain Rating 1: 7/10  · Location 1: abdomen  · Pain Addressed 1: Nurse notified, Pre-medicate for activity    Patients cultural, spiritual, Mormonism conflicts given the current situation: no    Living Environment:  Pt lives with mother, residing in hospital sponsored apartments.  Currently staying in hospital since 1/11/18.  Prior to admission, patients level of function was independence as of 2 weeks ago.  Patient has the following equipment: walker, rolling, bedside commode, wheelchair.  DME owned (not currently used): none.  Upon discharge, patient will have assistance from mother.    Objective:     Patient found with: peripheral IV, central line     General Precautions: Standard, fall, NPO   Orthopedic Precautions:N/A   Braces: N/A     Exams:    · Cognitive Exam:  Patient is oriented to Person, Place, Time and Situation and follows 75% of single step commands   · Fine Motor Coordination:    · -       Impaired  Left hand thumb/finger opposition skills and Right hand thumb/finger opposition skills  · Gross Motor Coordination:  Tremors with MMT  · Postural Exam:  Patient presented with the following abnormalities:    · -       No postural abnormalities identified  · Sensation:    · -       Impaired  light/touch B LEs, possibly due to cognitive deficits  · Skin Integrity/Edema:      · -       Skin integrity: Dry  · -       Edema: None noted B LEs    · RUE ROM: WFL  · RUE Strength: WFL  · LUE ROM: WFL  · LUE Strength: WFL    · RLE ROM: WFL  · RLE Strength: WFL  · LLE ROM: WFL  · LLE Strength: WFL    Functional Mobility:  Pt refused OOB activity    AM-PAC 6 " CLICK MOBILITY  Total Score:12       Therapeutic Activities and Exercises:   Whiteboard updated  Ed to mother on HEP in bed and frequency    Patient left supine with all lines intact, call button in reach and mother present.    GOALS:    Physical Therapy Goals        Problem: Physical Therapy Goal    Goal Priority Disciplines Outcome Goal Variances Interventions   Physical Therapy Goal     PT/OT, PT Ongoing (interventions implemented as appropriate)     Description:  Goals to be met by: 3/19/18     Patient will increase functional independence with mobility by performin. Supine to sit with Modified [place].  2. Sit to supine with Modified [place].  3. Sit to stand transfer with Minimal Assistance.  4. Bed to chair transfer with Contact Guard Assistance using Rolling Walker.  5. Gait  x 150 feet with Contact Guard Assistance using Rolling Walker.   6. Ascend/descend 1 stair with bilateral Handrails Minimal Assistance.   7. Lower extremity exercise program x 20 reps per handout, with assistance as needed.                      History:     Past Medical History:   Diagnosis Date    Alcoholic hepatitis with ascites     Alcoholic hepatitis with ascites     History of hematemesis 2017    Hypertension     Renal disorder        Past Surgical History:   Procedure Laterality Date    APPENDECTOMY      ERCP      ESOPHAGOGASTRODUODENOSCOPY      LIVER TRANSPLANT         Clinical Decision Making:     History  Co-morbidities and personal factors that may impact the plan of care Examination  Body Structures and Functions, activity limitations and participation restrictions that may impact the plan of care Clinical Presentation   Decision Making/ Complexity Score   Co-morbidities:   [] Time since onset of injury / illness / exacerbation  [x] Status of current condition  []Patient's cognitive status and safety concerns    [] Multiple Medical Problems (see med hx)  Personal Factors:   [] Patient's age  []  Prior Level of function   [] Patient's home situation (environment and family support)  [] Patient's level of motivation  [] Expected progression of patient      HISTORY:(criteria)    [] 77909 - no personal factors/history    [] 12829 - has 1-2 personal factor/comorbidity     [x] 48594 - has >3 personal factor/comorbidity     Body Regions:  [] Objective examination findings  [] Head     []  Neck  [x] Trunk   [x] Upper Extremity  [x] Lower Extremity    Body Systems:  [x] For communication ability, affect, cognition, language, and learning style: the assessment of the ability to make needs known, consciousness, orientation (person, place, and time), expected emotional /behavioral responses, and learning preferences (eg, learning barriers, education  needs)  [x] For the neuromuscular system: a general assessment of gross coordinated movement (eg, balance, gait, locomotion, transfers, and transitions) and motor function  (motor control and motor learning)  [x] For the musculoskeletal system: the assessment of gross symmetry, gross range of motion, gross strength, height, and weight  [] For the integumentary system: the assessment of pliability(texture), presence of scar formation, skin color, and skin integrity  [] For cardiovascular/pulmonary system: the assessment of heart rate, respiratory rate, blood pressure, and edema     Activity limitations:    [] Patient's cognitive status and saf ety concerns          [x] Status of current condition      [] Weight bearing restriction  [] Cardiopulmunary Restriction    Participation Restrictions:   [] Goals and goal agreement with the patient     [] Rehab potential (prognosis) and probable outcome      Examination of Body System: (criteria)    [] 95306 - addressing 1-2 elements    [x] 45153 - addressing a total of 3 or more elements     [] 32820 -  Addressing a total of 4 or more elements         Clinical Presentation: (criteria)  Evolving - 43408     On examination of body  system using standardized tests and measures patient presents with 3 or more elements from any of the following: body structures and functions, activity limitations, and/or participation restrictions.  Leading to a clinical presentation that is considered evolving with changing characteristics                              Clinical Decision Making  (Eval Complexity):  Moderate - 87815     Time Tracking:     PT Received On: 03/05/18  PT Start Time: 1235     PT Stop Time: 1257  PT Total Time (min): 22 min     Billable Minutes: Evaluation 22      Tory Amato, PT  03/05/2018

## 2018-03-05 NOTE — PROGRESS NOTES
[facility]  Adult Nutrition  Progress Note    SUMMARY     Recommendations    Recommendation/Intervention: Pt with zero oral intake x 5 days.   1. Continue current custom TPN of 92g AA / 182g dex plus IV lipids daily. This TPN provision meets 100% EPN and 75% EEN. Oral intake is not adequate to meet EEN.   2. If PO intake does not improve, recommend increasing TPN to 92g AA / 362 g dex plus IV lipids daily to provide 2099 kcal (100% EEN)  3. Encourage mother to bring in pts favorite foods when he feels up to it.   4. RD following    Goals: Pt will consume/ tolerate >75% of EEN and EPN    Nutrition Goal Status: progressing towards goal    Reason for Assessment    Reason for Assessment: RD follow-up  Diagnosis: transplant/postoperative complications  Relevent Medical History: OLTx 10/19/2017, complicate post-op w/ LAURA ongoing, chronic malnutrition, cirrhosis 2' EtOH   Interdisciplinary Rounds: attended     General Information Comments: TPN running at goal rate. Pt has not consumed anything orally in past few days. Pt out of it when visiting due to pain; spoke with mother, remains on HD 3x/week. Paracentesis tomorrow. Per MD, J tube placement off table until is assessed again by GI.     Nutrition Discharge Planning: Adequate nutrition PO vs EN    Nutrition Prescription Ordered    Current Diet Order: NPO  Current Nutrition Support Formula Ordered:  (Custom TPN 92g AA / 182g dex plus IV lipids)  Current Nutrition Support Rate Ordered: 48 (ml)  Current Nutrition Support Frequency Ordered: mL/hr  Oral Nutrition Supplement: Boost Breeze     Evaluation of Received Nutrients/Fluid Intake    Parenteral Calories (kcal): 987  Parenteral Protein (gm): 92  Parenteral Fluid (mL): 1152     Energy Calories Required: not meeting needs  % Kcal Needs: 79     Protein Required: meeting needs  % Protein Needs: 100     GIR (Glucose Infusion Rate) (mg/kg/min): 2.28 mg/kg/min  Lipid Calories (kcals): 500 kcals  I/O: +6.2L    "   Fluid Required: meeting needs  Comments: LBM 3/2  Tolerance: tolerating     Nutrition Risk Screen     Nutrition Risk Screen: large or nonhealing wound, burn or pressure ulcer    Nutrition/Diet History    Patient Reported Diet/Restrictions/Preferences: low salt  Typical Food/Fluid Intake: eats very little at this time  Food Preferences: No cultural or Alevism food preferences noted  Meal/Snack Patterns: pt likes to cook, when feeling well will cook and then only eat bites  Supplemental Drinks or Food Habits:  (protein powders sometimes)  Factors Affecting Nutritional Intake: altered gastrointestinal function, abdominal distention, decreased appetite, nausea/vomiting     Labs/Tests/Procedures/Meds    Diagnostic Test/Procedure Review: reviewed, pertinent  Pertinent Labs Reviewed: reviewed, pertinent  Pertinent Labs Comments: BUN 4.6, Phos 4.6  Pertinent Medications Reviewed: reviewed, pertinent  Pertinent Medications Comments: tacrolimus, metoclopramide, MVI, heparin, epoetin, psyllium, marinol, docusate    Physical Findings    Overall Physical Appearance: generalized wasting, loss of muscle mass, loss of subcutaneous fat, weak  Tubes: nasoduoduenal tube  Oral/Mouth Cavity: WDL  Skin: intact    Anthropometrics    Temp: 98.9 °F (37.2 °C)     Height: 5' 6" (167.6 cm)  Weight Method: Bed Scale  Weight: 56.1 kg (123 lb 10.9 oz)  Ideal Body Weight (IBW), Male: 142 lb     % Ideal Body Weight, Male (lb): 112.68 lb     BMI (Calculated): 25.9  BMI Grade: 25 - 29.9 - overweight  Weight Loss: unintentional  Usual Body Weight (UBW), k kg (2017 )     % Usual Body Weight: 86.71  % Weight Change From Usual Weight: -13.47 %     Estimated/Assessed Needs    Weight Used For Calorie Calculations: 62.3 kg (137 lb 5.6 oz)   Height (cm): 167.6 cm  Energy Calorie Requirements (kcal): 4375-5500 (30-35 kcal/kg)  Energy Need Method: Kcal/kg     RMR (Tripp-St. Jeor Equation): 1535.75     Weight Used For Protein Calculations: " 62.3 kg (137 lb 5.6 oz)  Protein Requirements: 81-94g (1.3-1.5 g/kg)    Fluid Requirements (mL): 1mL/kcal  Fluid Need Method: RDA Method     RDA Method (mL): 1869    Assessment and Plan    Nutrition Problem  inadequate oral intake    Related to (etiology):   Complicated post-op OLTx, unclear etiology    Signs and Symptoms (as evidenced by):  Pt meeting 0% EEN and EPN via PO diet, relying on TPN to meet needs    Interventions/Recommendations (treatment strategy):  See recs    Nutrition Diagnosis Status:   New        Monitor and Evaluation    Food and Nutrient Intake: energy intake, food and beverage intake  Food and Nutrient Adminstration: diet order  Knowledge/Beliefs/Attitudes: food and nutrition knowledge/skill  Physical Activity and Function: nutrition-related ADLs and IADLs  Anthropometric Measurements: weight, weight change, body mass index  Biochemical Data, Medical Tests and Procedures: electrolyte and renal panel, lipid profile, gastrointestinal profile, glucose/endocrine profile, inflammatory profile  Nutrition-Focused Physical Findings: overall appearance    Nutrition Risk    Level of Risk:  (2x/week)    Nutrition Follow-Up    RD Follow-up?: Yes    Shoshana Osuna, dietetic intern

## 2018-03-05 NOTE — NURSING
Patient was transported to HD prior to nurse assessment or VS.  HD nursing called for report after transport.  Report given based on night nursing report.  Will monitor patient on return to [facility].

## 2018-03-05 NOTE — PLAN OF CARE
Problem: Patient Care Overview  Goal: Plan of Care Review  Outcome: Ongoing (interventions implemented as appropriate)  - Continued lethargy, word salad & slurred speech  - Oriented x4, neuro checks Q4H  - TPN @ 48 mL/hr  - HD MWF.  Done today, 2.5 L removed.  1 unit PRBC transfused  - Plan for EGD & possibly paracentesis tomorrow.  NPO past midnight  - No evidence of pressure wounds.  Encourage nutrition, TPN infusing, assist with turns and mobility  - Last BM 3/3 with enema.  Refusing all oral bowel regimen at this time.

## 2018-03-05 NOTE — ANESTHESIA PREPROCEDURE EVALUATION
[facility]  Anesthesia Pre-Operative Evaluation         Patient Name: Jhonny Diana  YOB: 1990  MRN: [id number]    SUBJECTIVE:     Pre-operative evaluation for Procedure(s) (LRB):  ESOPHAGOGASTRODUODENOSCOPY (EGD) (N/A)     03/05/2018    Jhonny Diana is a 27 y.o. male w/ a significant PMHx of asthma, alcoholic cirrhosis S/p DDLT 10/19/2017 c/b seizures, ATN requiring HD, superficial wound infection s/p wound vac to chevron incision, and multiple admissions for fevers and abdominal pain with RLQ loculation. Patient is currently undergoing extensive work up. Underwent EGD on 3/1/18. Patient continues to have nausea/vomiting and  now presents for the above procedure(s).      LDA:        Hemodialysis Catheter right internal jugular (Active)   Site Assessment Clean;Dry;Intact 3/5/2018  7:50 AM   Status Accessed 3/5/2018  7:50 AM   Flows Good 3/5/2018  7:50 AM   Dressing Intervention Dressing reinforced 3/5/2018  7:50 AM   Dressing Status Biopatch in place;Clean;Dry;Intact 3/5/2018  7:50 AM   Dressing Change Due 03/07/18 3/5/2018  7:50 AM   Verification by X-ray Yes 2/28/2018 11:30 AM   Site Condition No complications 3/5/2018  7:50 AM   Dressing Occlusive 3/2/2018  8:00 PM   Daily Line Review Performed 3/2/2018  8:00 PM   Arterial Volume (mL) 1.8 mL 2/28/2018 11:30 AM   Venous Volume (mL) 1.8 mL 2/28/2018 11:30 AM   Number of days:             Percutaneous Central Line Insertion/Assessment - triple lumen  02/24/18 1600 left internal jugular (Active)   Dressing biopatch in place;dressing dry and intact 3/4/2018  7:55 PM   Securement secured w/ sutures 3/4/2018  7:55 PM   Additional Site Signs no erythema;no warmth;no edema;no pain 3/4/2018  8:00 AM   Distal Patency/Care infusing 3/4/2018  8:00 AM   Medial Patency/Care infusing 3/4/2018  8:00 AM   Proximal Patency/Care normal saline locked 3/4/2018  8:00 AM   Dressing Change Due 03/11/18 3/4/2018 11:13 AM   Daily Line Review Performed 3/4/2018  11:13 AM   Number of days: 8            Midline Catheter Insertion/Assessment  - Single Lumen 02/06/18 0917 Left brachial vein 18g x 8cm (Active)   Site Assessment Clean;Dry;Intact;No redness;No swelling 3/4/2018  7:55 PM   IV Device Securement catheter securement device 3/4/2018  7:55 PM   Line Status Saline locked 3/4/2018  7:55 PM   Dressing Status Biopatch in place 3/3/2018  8:33 AM   Dressing Intervention Dressing reinforced 2/28/2018  7:18 PM   Dressing Change Due 03/06/18 3/4/2018  8:00 AM   Site Change Due 03/07/18 3/4/2018  8:00 AM   Reason Not Rotated Not due 3/4/2018  8:00 AM   Number of days: 27       Prev airway:     Present Prior to Hospital Arrival?: No; Placement Date: 02/19/18; Placement Time: 1439; Method of Intubation: Direct laryngoscopy; Inserted by: CRNA; Airway Device: Endotracheal Tube; Mask Ventilation: Easy; Intubated: Postinduction; Blade: Moore #2; Airway Device Size: 7.0; Style: Cuffed; Cuff Inflation: Minimal occlusive pressure; Inflation Amount: 6; Placement Verified By: Auscultation; Grade: Grade I; Complicating Factors: None; Intubation Findings: Positive EtCO2, Bilateral breath sounds, Atraumatic/Condition of teeth unchanged;  Depth of Insertion: 22; Securment: Lips; Complications: None; Breath Sounds: Equal Bilateral; Insertion Attempts: 1; Removal Date: 02/19/18;  Removal Time: 1526    Drips:   TPN ADULT CENTRAL LINE CUSTOM 48 mL/hr at 03/04/18 2104    TPN ADULT CENTRAL LINE CUSTOM         Patient Active Problem List   Diagnosis    Alcoholism /alcohol abuse    Acute renal failure with tubular necrosis    Liver transplanted    Prophylactic immunotherapy    Long-term use of immunosuppressant medication    At risk for opportunistic infections    Seizure    Delayed surgical wound healing    Anemia of chronic disease    Biliary stricture of transplanted liver    Severe malnutrition    Hypothyroidism    Recurrent pleural effusion on right    N&V (nausea and vomiting)     Nausea and vomiting    Severe protein-calorie malnutrition    Other ascites    Constipation    Fever    Kidney transplant candidate    Other cytomegaloviral diseases    Other neutropenia       Review of patient's allergies indicates:   Allergen Reactions    Bactrim [sulfamethoxazole-trimethoprim] Other (See Comments)     Mookie Trell Syndrome       Current Inpatient Medications:   bisacodyl  10 mg Oral Daily    cycloSPORINE modified  175 mg Oral BID    docusate sodium  100 mg Oral BID    dronabinol  2.5 mg Oral Daily    epoetin maurisio (PROCRIT) injection  10,000 Units Intravenous Every Mon, Wed, Fri    ertapenem (INVANZ) IVPB  500 mg Intravenous Q24H    fat emulsion 20%  250 mL Intravenous Once    heparin (porcine)  5,000 Units Subcutaneous Q8H    custom IVPB builder   Intravenous Q24H    levetiracetam oral soln  500 mg Oral BID    levothyroxine  75 mcg Oral Before breakfast    metoclopramide HCl  10 mg Oral QID (AC & HS)    multivitamin  1 tablet Oral Daily    omeprazole  40 mg Oral QAM    polyethylene glycol  17 g Oral BID    psyllium husk (aspartame)  3.4 g Oral BID    ursodiol  300 mg Oral BID       No current facility-administered medications on file prior to encounter.      Current Outpatient Prescriptions on File Prior to Encounter   Medication Sig Dispense Refill    aspirin (ECOTRIN) 81 MG EC tablet Take 1 tablet (81 mg total) by mouth once daily. 30 tablet 5    atovaquone (MEPRON) 750 mg/5 mL Susp Take 10 mLs (1,500 mg total) by mouth once daily. Stop on 4/17/18 300 mL 5    cycloSPORINE modified, NEORAL, 25 MG capsule Take 3 capsules (75 mg total) by mouth 2 (two) times daily. 90 capsule 5    docusate sodium (COLACE) 100 MG capsule Take 100 mg by mouth 3 (three) times daily as needed for Constipation.      ergocalciferol (ERGOCALCIFEROL) 50,000 unit Cap Take 1 capsule (50,000 Units total) by mouth every 7 days. 4 capsule 5    levETIRAcetam (KEPPRA) 500 MG Tab Take 1 tablet  (500 mg total) by mouth 2 (two) times daily. 60 tablet 11    levothyroxine (SYNTHROID) 75 MCG tablet Take 1 tablet (75 mcg total) by mouth before breakfast. 30 tablet 11    mirtazapine (REMERON) 7.5 MG Tab Take 1 tablet (7.5 mg total) by mouth every evening. 30 tablet 11    multivitamin (THERAGRAN) tablet Take 1 tablet by mouth once daily. 30 tablet 11    ondansetron (ZOFRAN-ODT) 8 MG TbDL Take 1 tablet (8 mg total) by mouth every 8 (eight) hours as needed (nausea). 30 tablet 1    oxyCODONE (ROXICODONE) 10 mg Tab immediate release tablet Take 0.5-1 tablets (5-10 mg total) by mouth every 6 (six) hours as needed for Pain. 30 tablet 0    pantoprazole (PROTONIX) 40 MG tablet Take 1 tablet (40 mg total) by mouth once daily. 30 tablet 5    sodium bicarbonate 650 MG tablet Take 2 tablets (1,300 mg total) by mouth 2 (two) times daily. 120 tablet 2    thiamine 100 MG tablet Take 1 tablet (100 mg total) by mouth once daily. 30 tablet 6    ursodiol (ACTIGALL) 300 mg capsule Take 1 capsule (300 mg total) by mouth 2 (two) times daily. 60 capsule 11       Past Surgical History:   Procedure Laterality Date    APPENDECTOMY      ERCP      ESOPHAGOGASTRODUODENOSCOPY      LIVER TRANSPLANT         Social History     Social History    Marital status: Single     Spouse name: N/A    Number of children: N/A    Years of education: N/A     Occupational History    Not on file.     Social History Main Topics    Smoking status: Former Smoker     Packs/day: 1.00     Years: 10.00     Types: Cigarettes     Start date: 2/19/2017    Smokeless tobacco: Never Used    Alcohol use No      Comment: a fifth of liquor daily for years, cut back over last 2 months    Drug use: No    Sexual activity: Not on file     Other Topics Concern    Not on file     Social History Narrative    No narrative on file       OBJECTIVE:     Vital Signs Range (Last 24H):  Temp:  [36.8 °C (98.2 °F)-37.3 °C (99.1 °F)]   Pulse:  [79-90]   Resp:  [15-18]    BP: (116-139)/(65-84)   SpO2:  [95 %-96 %]       CBC:   Recent Labs      03/04/18   0500  03/05/18   0617   WBC  6.74  7.92   RBC  2.35*  2.26*   HGB  7.3*  7.0*   HCT  22.2*  21.4*   PLT  161  161   MCV  95  95   MCH  31.1*  31.0   MCHC  32.9  32.7       CMP:   Recent Labs      03/04/18   0500  03/05/18   0617   NA  136  136   K  3.9  4.2   CL  103  102   CO2  22*  22*   BUN  42*  59*   CREATININE  3.8*  4.6*   GLU  90  92   MG  2.4  2.4   PHOS  4.1  4.6*   CALCIUM  9.2  9.5   ALBUMIN  1.8*  1.8*   PROT  5.3*  5.3*   ALKPHOS  123  124   ALT  5*  6*   AST  42*  42*   BILITOT  1.7*  1.6*       INR:  No results for input(s): PT, INR, PROTIME, APTT in the last 72 hours.    Diagnostic Studies:     EKG:     Test Reason : R50.9  Blood Pressure : 097/056 mmHG  Vent. Rate : 091 BPM     Atrial Rate : 091 BPM     P-R Int : 144 ms          QRS Dur : 084 ms      QT Int : 378 ms       P-R-T Axes : 048 040 049 degrees     QTc Int : 464 ms    Normal sinus rhythm  Normal ECG  When compared with ECG of 03-DEC-2017 20:16,  Nonspecific T wave abnormality now evident in Lateral leads  Confirmed by Armando TEJADA, Jason Fox ([id number]) on 1/12/2018 11:22:33  AM    Referred By: KAR   SELF           Confirmed By:Jason Roberts MD    2D ECHO:  Results for orders placed or performed during the hospital encounter of 12/03/17   2D echo with color flow doppler   Result Value Ref Range    EF 65 55 - 65    Mitral Valve Regurgitation TRIVIAL     Diastolic Dysfunction No     Pericardial Effusion TRIVIAL     Tricuspid Valve Regurgitation TRIVIAL          ASSESSMENT/PLAN:         Anesthesia Evaluation    I have reviewed the Patient Summary Reports.     I have reviewed the Medications.     Review of Systems  Anesthesia Hx:  No problems with previous Anesthesia  History of prior surgery of interest to airway management or planning: liver transplant. Previous anesthesia: General Denies Family Hx of Anesthesia complications.   Denies  Personal Hx of Anesthesia complications.   Social:  Former Smoker, No Alcohol Use    Hematology/Oncology:  Hematology Normal   Oncology Normal     EENT/Dental:EENT/Dental Normal   Cardiovascular:   Exercise tolerance: good Hypertension, well controlled    Pulmonary:  Pulmonary Normal    Renal/:   Chronic Renal Disease, ARF, Dialysis    Hepatic/GI:   GERD Liver Disease, Hepatitis    Musculoskeletal:  Musculoskeletal Normal    Neurological:   Seizures, well controlled    Endocrine:   Hypothyroidism    Dermatological:  Skin Normal    Psych:   Psychiatric History          Physical Exam  General:  Well nourished    Airway/Jaw/Neck:  Airway Findings: Mouth Opening: Normal Tongue: Normal  General Airway Assessment: Adult  Mallampati: II  TM Distance: Normal, at least 6 cm  Jaw/Neck Findings:  Neck ROM: Normal ROM  Neck Findings: Normal     Dental:  Dental Findings: In tact   Chest/Lungs:  Chest/Lungs Findings: Normal Respiratory Rate     Heart/Vascular:  Heart Findings: Rate: Normal  Rhythm: Regular Rhythm  Sounds: Normal  Heart murmur: negative Vascular Findings: Normal     Musculoskeletal:  Musculoskeletal Findings: Normal   Skin:  Skin Findings: Normal    Mental Status:  Mental Status Findings:  Cooperative         Anesthesia Plan  Type of Anesthesia, risks & benefits discussed:  Anesthesia Type:  general, MAC  Patient's Preference:   Intra-op Monitoring Plan: standard ASA monitors  Intra-op Monitoring Plan Comments:   Post Op Pain Control Plan:   Post Op Pain Control Plan Comments:   Induction:   IV  Beta Blocker:  Patient is not currently on a Beta-Blocker (No further documentation required).       Informed Consent: Patient representative understands risks and agrees with Anesthesia plan.  Questions answered. Anesthesia consent signed with patient representative.  ASA Score: 4     Day of Surgery Review of History & Physical: I have interviewed and examined the patient. I have reviewed the patient's H&P dated:  There  are no significant changes.  H&P update referred to the provider.         Ready For Surgery From Anesthesia Perspective.

## 2018-03-05 NOTE — PROGRESS NOTES
Patient received from floor with report from Rizwana Hair. Maintenance dialysis began per orders via right IJ tunneled catheter.

## 2018-03-05 NOTE — ASSESSMENT & PLAN NOTE
- Paracentesis performed 1/11/18 with 1600 ml removed.    - fluid negative for infection.   - Repeat para 1/19 negative for infection per cell count.  - no fluid seen for paracentesis on 1/23.  - Plan to re attempt paracentesis tomorrow to assess for peritonitis.

## 2018-03-05 NOTE — ASSESSMENT & PLAN NOTE
- Poor PO intake since transplant requiring short course of TPN during previous hospital stay.   - albumin remains decreased but appetite slowly improving per pt.   - Dietary consulted. Will need to closely monitor PO intake.   - supplements also ordered.   - SHANE with tube feeding start 1/16. Stopped 1/17/18, was not tolerating. Shane removed 1/20.  - prealbumin 7 on 1/15.  - TPN started 1/16/18.   - Pt able to eat minimally 1/21 and 1/22 which is an improvement.  - GI consulted - EGD reviewed, increased ppi bid, miralax bid, and metamucil bid, decreased narcotics.  - continue reglan, placed SHANE tube with tube feeds - transitioned to nightly feeds. Not tolerating well due to nausea.   - small bowel follow through with normal findings.   - consulted gi for possible gj tube placement, holding off on this for now as with ongoing fevers.  -Dietary changed tube feeds back to 24 hours from nightly 2/19 as not eating enough during the day to meet caloric needs. TF on hold since 2/20.  - retrial marinol 2/22 as pt reports that's the only thing helping his appetite, in the evening 2/2 concern for possible drowsiness  - Pt then developed persistent N/V again and TPN started 2/24. Cont TPN for now.   - Pt able to eat 1/4 sandwich without vomiting 2/25.  Not been able to eat anything n more than that in 48 hours per pt and mother.    - GI to perform EGD in am to further assess.

## 2018-03-05 NOTE — ASSESSMENT & PLAN NOTE
- h/o constipation, reports having regular BM's with bowel regimen.  - vomiting episode x 1 at home. Anti-emetics ordered PRN.  - KUB repeated 1/14/18 given increased abdominal pain- mild, generalized bowel distention suggesting ileus.  Diet changed to clears as tolerated for bowel rest .  - SHANE tube placed 1/16 for tube feeds. Not tolerating tube feeds, stopped 1/17/18.  - D/C shane 1/19, now in place as of 1/25.  - chronic constipation, Cont bowel regimen.   - See protein calorie malnutrition.  - TF have been on hold since 2/20 for N/V. shane d/c'd 2/23 as not tolerating TF  - 2/24 changes: Increased Reglan to 10 mg, D/C atovaquone, transitioned cyclo to Prograf, change twyla to BID, check Keppra level  - GI consulted. Plan for EGD tomorrow as with continued nausea/vomiting with po intake. Appreciate GI recs.    - Cont to have nausea and vomiting w medications and eating.  Cont TPN.

## 2018-03-05 NOTE — PLAN OF CARE
Problem: Physical Therapy Goal  Goal: Physical Therapy Goal  Goals to be met by: 3/19/18     Patient will increase functional independence with mobility by performin. Supine to sit with Modified [place].  2. Sit to supine with Modified [place].  3. Sit to stand transfer with Minimal Assistance.  4. Bed to chair transfer with Contact Guard Assistance using Rolling Walker.  5. Gait  x 150 feet with Contact Guard Assistance using Rolling Walker.   6. Ascend/descend 1 stair with bilateral Handrails Minimal Assistance.   7. Lower extremity exercise program x 20 reps per handout, with assistance as needed.    Outcome: Ongoing (interventions implemented as appropriate)  PT goals established.

## 2018-03-05 NOTE — PROGRESS NOTES
[facility]  Infectious Disease  Progress Note    Patient Name: Jhonny Diana  MRN: [id number]  Admission Date: 1/11/2018  Length of Stay: 52 days  Attending Physician: Nathanael Mdeina MD  Primary Care Provider: Primary Doctor No    Isolation Status: No active isolations  Assessment/Plan:      Fever    27-year-old male  - EtOH cirrhosis s/p DDLT 10/19/2017 CMV D+/R+  - Seizures on tacro, on cyclosporine and keppra  - ATN on HD MWF  - Multiple episodes of peritonitis s/p ex-laporoscopy 2/7/2018  - Persistent ascites / intraabdominal fluid collection  - Biliary strictures requiring multiple ERCP with biliary stenting   - Pleural effusion s/p thoracentesis  - Malnutrition on TPN  - Ileus vs. partial SBO  - Immunosuppression on prophylaxis - pentamidine, isavuconazole    Additional infectious evaluation has been negative (adeno 3/1, aspergillus 2/1, CMV 3/1, EBV 2/28, Indium scan 2/28)    Recommendation:  - Continue ertapenem, renally dosed - started 2/22 - complete 14 day course - may need to discontinue sooner if any new neurological findings as can lower seizure threshold  - EGD planned - send pathology for CMV, bacterial, AFB, fungal stains  - Repeat paracentesis planned for persistent moderate volume ascites - send for cell count with diff, bacterial/fungal/AFB stain and cultures            Anticipated Disposition: improved PO intake, abdominal pain    Thank you for your consult. I will follow-up with patient. Please contact us if you have any additional questions.    Marcela Longoria MD  Infectious Disease  [facility]    Subjective:     Principal Problem:Fever    HPI: No notes on file  Interval History:   No fevers overnight  Tried to eat, but led to vomiting  Still with nausea, LUQ pain  Per mom, twitching lips  Intermittent loose stools    Review of Systems   Constitutional: Negative for chills, diaphoresis and fever.   HENT: Negative for rhinorrhea and sore throat.    Respiratory:  Negative for cough and shortness of breath.    Cardiovascular: Negative for chest pain and leg swelling.   Gastrointestinal: Positive for abdominal pain, diarrhea, nausea and vomiting.   Genitourinary: Negative for dysuria and hematuria.   Musculoskeletal: Negative for arthralgias and myalgias.   Skin: Negative for rash.   Neurological: Negative for headaches.     Objective:     Vital Signs (Most Recent):  Temp: 98.7 °F (37.1 °C) (03/04/18 0855)  Pulse: 81 (03/04/18 0855)  Resp: 18 (03/04/18 0855)  BP: 131/78 (03/04/18 0855)  SpO2: 95 % (03/04/18 0855) Vital Signs (24h Range):  Temp:  [98.7 °F (37.1 °C)-99.5 °F (37.5 °C)] 98.7 °F (37.1 °C)  Pulse:  [80-93] 81  Resp:  [16-18] 18  SpO2:  [94 %-97 %] 95 %  BP: (128-136)/(74-83) 131/78     Weight: 56.1 kg (123 lb 10.9 oz)  Body mass index is 19.96 kg/m².    Estimated Creatinine Clearance: 23 mL/min (A) (based on SCr of 3.8 mg/dL (H)).    Physical Exam   Constitutional: He is oriented to person, place, and time. He appears well-developed. He appears distressed.   HENT:   Head: Normocephalic and atraumatic.   Eyes: Conjunctivae and EOM are normal.   Neck: Normal range of motion. Neck supple.   Cardiovascular: Normal rate.    Pulmonary/Chest: Effort normal. No respiratory distress.   Abdominal: Soft. Bowel sounds are normal. He exhibits distension. There is tenderness. There is guarding.   Musculoskeletal: Normal range of motion. He exhibits no edema.   Neurological: He is alert and oriented to person, place, and time.   Skin: Skin is warm and dry. No rash noted. He is not diaphoretic. No erythema.   Psychiatric: He has a normal mood and affect. His behavior is normal.       Significant Labs: All pertinent labs within the past 24 hours have been reviewed.    Significant Imaging: I have reviewed all pertinent imaging results/findings within the past 24 hours.

## 2018-03-05 NOTE — PROGRESS NOTES
Dialysis completed. Right IJ tunneled catheter flushed with normal saline, heparinized, capped and taped. Patient dialyzed for 3  Hours with net fluid removal of 2.5 liters. Patient received 1 unit of blood during dialysis .Tolerated well with stable vital signs.

## 2018-03-05 NOTE — ASSESSMENT & PLAN NOTE
- CMV detected 2/1 now on tx dose valcyte. CMV PCR 2/15 not detected. CMV PCR 2/22, negative   -Repeat CMV PCR 3/1 negative.    - Pentam given 2/24

## 2018-03-05 NOTE — ASSESSMENT & PLAN NOTE
- detected at 370 on CMV PCR 2/1.  - case discussed with ID and will hold off on treatment at this time given low WBC.   - undetected CMV PCR 2/8.  - continue Valcyte 200 mg every Mon, Wed, Fri.  - CMV PCR 2/15 negative  - Per ID, valcyte dose decreased to ppx dose on 2/18.  - CMV PCR 2/22 negative  - Plan for repeat scope tomorrow to assess persistent N/V and possible CMV causing these symptoms.

## 2018-03-05 NOTE — ASSESSMENT & PLAN NOTE
- Enema ordered 1/20 and 1/21 with BM.  - Encourage ambulation.  - Decrease narcotics.  - D/c metamucil and miralax bid as makes patient nauseated.  - Increase reglan qid.  - Small bowel follow through with normal findings.   - Schedule bisacodyl for bowel regimen.   - KUB 2/21 with significant amounts of stool and gas. Pt declines miralax, metamucil, colace.   -encouraged suppository.  - Improved on KUB 2/23  - Pt moving bowels now but will have to keep aggressive bowel regimen on board.

## 2018-03-05 NOTE — ASSESSMENT & PLAN NOTE
- Started on vanc/cefepime for ERCP. Now transitioned to vanc/zosyn.  - Blood cultures sent, prelim NGTD. Repeat blood cultures and chest xray today 2/10.  - thoracentesis 2/1 - fluid negative for infection.  - atelectasis noted on ct scan - start breathing tx & CPT.  - now cmv positive - started treatment 2/5/18.  - taken to OR 2/7 for exp lap - fluid collections drained, cultures pending.  - Patient with low grade fever 2/15, 100.3.  -CT A/P 2/15 with no evidence of IA fluid infection. Did show R pleural effusion s/p thoracentesis, cell count negative for infection, cx pending.  -Repeat blood cx 2/15 prelim NGTD, CMV PCR 2/15 negative   Complaining of abdominal pain. Is tender in RUQ.  -Remains with fevers  - Bilirubin remains elevated. Concerning for cholangitis. Antibiotics broadened 2/17. ID re consulted.   - ERCP  2/19, overall unremarkable findings. Did place new larger stents. Will need repeat ERCP in 10 weeks. Bilirubin is stable post ERCP.  -Tmax 101 2/21 PM again ID following. Plan to transition vanc/zosyn to ertapenem 2/22 as no improvement noted.   - Erta started 2/22.  -CT A/P obtained 2/26 with focal/loculated region of fluid in the lower right abdomen measuring approximately 8.7 x 5.5 cm.   -IR attempted drainage on fluid collection on 2/27, however, per IR,there was limited fluid interposed with bowel in the RLQ with no safe pocket seen for drainage.  -Tagged WBC scan 2/27, negative results.  -Remains with low grade fevers.   -Per transplant attending, will hold off on EGD at this time as concern anesthesia will put patient at further increase risk for seizure activity. Will plan for potenial EGD on Monday. Discussed with GI, appreciate their assistance.  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 EBV PCR negative. Adenovirus pending. Repeat CMV PCR negative.    -Back pain and abdominal pain persists. Having BMs 3/2 but none since.   - Has remained afebrile. ID following. Cont Ertapenem for  now.

## 2018-03-05 NOTE — PROGRESS NOTES
[facility]  Liver Transplant  Progress Note    Patient Name: Jhonny Diana  MRN: [id number]  Admission Date: 2018  Hospital Length of Stay: 53 days  Code Status: Full Code  Primary Care Provider: Primary Doctor No  Post-Operative Day: 137    ORGAN:   LIVER  Disease Etiology: Acute Alcoholic Hepatitis  Donor Type:    - Brain Death  CDC High Risk:   No  Donor CMV Status:   Donor CMV Status: Positive  Donor HBcAB:   Negative  Donor HCV Status:   Negative  Whole or Partial: Whole Liver  Biliary Anastomosis: End to End  Arterial Anatomy: Standard  Subjective:     History of Present Illness:  Jhonny Diana is a 28 y/o male with past medical history of alcoholic cirrhosis.  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 Found to have peritonitis in November (WBC 5000, 75% PNM) neg for bile leak. He was treated initially with vanc/cefepime. Repeat cell counts  with some improvement (WBC 1400, 45% PNM). He has undergone multiple paracenteses as well as abscess drainage of perihepatic fluid collections and treated with antimicrobial therapy but no positive cultures. Of note, biliary stricture also identified and ERCP performed on 2017 with sphincterotomy and biliary stent placed. Liver tests still have not normalized despite intervention, bilirubin and AP remain elevated. Other pertinent PMH current wound vac in place 2/2 wound infection, malnutrition requiring TPN for short course and ongoing hypoalbuminemia, and seizure activity while on prograf and has since been switched to cyclosporine without reoccurrence.  He presented to the ER for fever, abdominal pain, and N/V. He reports fever (103) for 1  day prior. Overnight, he developed N/V, reports small amount of green emesis with new left sided pain. He also endorses worsening SOB with exertion. He was scheduled as an outpatient for follow up paracentesis and IR drainage of fluid collection. CXR in ER shows large pleural effusion with subsegmental atelectasis. Infectious work up initiated in ER. His ANC is 900. Broad spectrum antibiotics initiated in ED. He denies chest pain, palpitations, diarrhea, constipation, or back pain. Denies any sick contacts.    Hospital Course:  Thoracentesis (1.2L off), Paracentesis (1.6L off), and IR drainage of fluid collection 1/11, all fluids negative for infection. ID consulted. Broad spectrum antibiotics d/c'd 1/15. Chronically malnourished with poor PO intake, prealbumin 7. Shane tube placed 1/15 for tube feedings.     ERCP 1/17 with sludge and a biliary stone which was removed and stent exchanged.  Remained with n/v; therefore, TF remained on hold and TPN continued.   Pt with fever s/p ERCP on 1/17 which continued until 1/19.  Vanc/cefepime restarted.  Blood cx 1/17 and 1/18 NGTD.  ID reconsulted.  Fungal markers sent.  CT C/A/P obtained.  With large R pleural effusion and ascites- both drained 1/19 and negative for infx per cell count. Pt afebrile 1/20. Liver biopsy 1/23 - without rejection.  EGD 1/25 - unremarkable for source of GI symptoms.  Resume diet along with other GI recommendations. SHANE tube placed for tube feeds.       2/22: Pt again spiked temp of 101 overnight, now down to 99. Resent Bcx overnight, prelim NGTD. Transitioned to Erta from vanc/zosyn per ID. Planning for tagged WBC scan on Monday. R sided abdominal erythema/dependent edema improving today.  TF have been on hold since 2/20. Will continue to hold today per pt request. Pt working on diet. KUB yesterday with significant stool and gas. Declines colace, miralax, metamucil, but willing to take daily bisacodyl. Pt agreeable to suppository today. If  suppository not successful can give mag citrate through dylan tonight. Will also retrial Marinol tonight as pt reported improvement in appetite previously. Will monitor closely for sedation.     2/23:  2 small BM 2/22.  Tolerated HD.  [place] removed.  Will attempt to eat this PM.      2/24:  Pt ate then vomited hamburger 2/23.  Will resume TPN.  D/C atovaquone, transition cyclo to Prograf, check Keppra level, increase Reglan to 0 mg, change twyla to BID for nausea.  Will give pentam today.    2/25:  TPN going well, pt ate 1/2 subway sandwhich without vomiting yesterday, will continue to monitor.  Plan to repeat EGD this week.  Pt with drop in H/h again and appears to be hemolyzing.  LDH elevated, hapto slightly low, LEE pending.  With neutropenia- Neupogen given.    2/26:  Pt with severe back pain s/p HD. Unclear if secondary to neupogen.  Repeat labs stable. Indium scan scheduled for 2/27- injection done today (back pain started prior). CT A/P obtained with fluid collection seen in pelvis.    2/27: Patient spiking fevers, 101. Indium scan, negative results. CT A/P obtained 2/26 with focal/loculated region of fluid in the lower right abdomen measuring approximately 8.7 x 5.5 cm. IR attempted drainage, however, per IR,there was  limited fluid interposed with bowel in the RLQ with no safe pocket seen for drainage.    2/28: Remains with low grade fevers. Neupogen given.     3/1: Patient with stuttering speech, trouble recalling specific words when speaking. Does have hx of seizure (on keppra) previously while on prograf. Switched to prograf 2/24 from cyclosporine. Neuro exam unremarkable. CT head without contrast obtained, unremarkable. In light of these symptoms, prograf d/c, cyclosporine restarted 3/1. Per transplant attending, held off on EGD at this time as concern anesthesia would put patient at further increase risk for seizure activity.     3/4: pt walked in room yesterday.  Had 1 BM.  No appetite still.  Cont TPN.   He reports swelling and back pain today.  He vomited after taking Oxy PO.  Fentanyl 12.5mg IV ordered.  Patient rested well afterward.  Remains afebrile.   ID following.   Cont Ertapenem.  Repeat blood and urine cx given continued malaise and stutter- blood cx prelim NGTD.  UA ok.  Urine cx pending.    Tentative plan for EGD on Monday to assess source of fevers/nausea/abdominal pain w bx for CMV and GVHD.  Will also reassess rather able to perform paracentesis.     3/5: no acute events overnight. Pt still not feeling well and with poor sleep overnight. Cont to have mumbling of speech at times but slightly better than before. Remains with abdominal pain but relieved with PO pain meds. Tolerated HD well today. Will plan for paracentesis and EGD tomorrow to assess for peritonitis and cause of persistent N/V respectively.     Scheduled Meds:   bisacodyl  10 mg Oral Daily    cycloSPORINE modified  175 mg Oral BID    docusate sodium  100 mg Oral BID    dronabinol  2.5 mg Oral Daily    epoetin maurisio (PROCRIT) injection  10,000 Units Intravenous Every Mon, Wed, Fri    ertapenem (INVANZ) IVPB  500 mg Intravenous Q24H    fat emulsion 20%  250 mL Intravenous Once    heparin (porcine)  5,000 Units Subcutaneous Q8H    custom IVPB builder   Intravenous Q24H    levetiracetam oral soln  500 mg Oral BID    levothyroxine  75 mcg Oral Before breakfast    metoclopramide HCl  10 mg Oral QID (AC & HS)    multivitamin  1 tablet Oral Daily    omeprazole  40 mg Oral QAM    polyethylene glycol  17 g Oral BID    psyllium husk (aspartame)  3.4 g Oral BID    ursodiol  300 mg Oral BID     Continuous Infusions:   TPN ADULT CENTRAL LINE CUSTOM 48 mL/hr at 03/04/18 2104    TPN ADULT CENTRAL LINE CUSTOM       PRN Meds:sodium chloride, acetaminophen, albuterol-ipratropium 2.5mg-0.5mg/3mL, bisacodyl, dextrose 50%, dextrose 50%, glucagon (human recombinant), glucose, glucose, heparin (porcine), naloxone, ondansetron, ondansetron,  oxyCODONE, prochlorperazine, simethicone, sodium chloride 0.9%    Review of Systems   Constitutional: Positive for activity change, appetite change, fatigue and fever. Negative for chills.   HENT: Negative.  Negative for congestion and facial swelling.    Eyes: Negative for pain, discharge and visual disturbance.   Respiratory: Negative for cough, chest tightness, shortness of breath and wheezing.    Cardiovascular: Negative for chest pain, palpitations and leg swelling.   Gastrointestinal: Positive for abdominal distention, abdominal pain and nausea. Negative for constipation.   Endocrine: Negative.    Genitourinary: Positive for decreased urine volume. Negative for difficulty urinating.        Anuric   Musculoskeletal: Positive for back pain. Negative for arthralgias and myalgias.   Skin: Positive for color change and wound. Negative for rash.   Allergic/Immunologic: Positive for immunocompromised state.   Neurological: Positive for weakness. Negative for dizziness, seizures and headaches.   Psychiatric/Behavioral: Positive for decreased concentration and dysphoric mood. Negative for confusion. The patient is not nervous/anxious.    All other systems reviewed and are negative.    Objective:     Vital Signs (Most Recent):  Temp: 98.9 °F (37.2 °C) (03/05/18 1219)  Pulse: 82 (03/05/18 1219)  Resp: 18 (03/05/18 1219)  BP: 124/69 (03/05/18 1219)  SpO2: 95 % (03/05/18 1219) Vital Signs (24h Range):  Temp:  [98.2 °F (36.8 °C)-99.1 °F (37.3 °C)] 98.9 °F (37.2 °C)  Pulse:  [79-90] 82  Resp:  [15-18] 18  SpO2:  [95 %-96 %] 95 %  BP: (116-139)/(65-84) 124/69     Weight: 56.1 kg (123 lb 10.9 oz)  Body mass index is 19.96 kg/m².    Intake/Output - Last 3 Shifts       03/03 0700 - 03/04 0659 03/04 0700 - 03/05 0659 03/05 0700 - 03/06 0659    P.O. 480 300     Blood   308    Other   958    IV Piggyback 350      .4      Total Intake(mL/kg) 1477.4 (26.3) 300 (5.3) 1266 (22.6)    Urine (mL/kg/hr) 100 (0.1) 275 (0.2)      Emesis/NG output 0 (0) 0 (0)     Other 0 (0) 0 (0) 3476 (7.9)    Stool 0 (0) 0 (0) 0 (0)    Blood 0 (0) 0 (0)     Total Output     Net +1377.4 +25 -2210           Urine Occurrence 0 x 0 x     Stool Occurrence 1 x 0 x 0 x    Emesis Occurrence 0 x 0 x           Physical Exam   Constitutional: He is oriented to person, place, and time. He appears well-developed. No distress.   Temporal and distal extremity muscle wasting   HENT:   Head: Normocephalic and atraumatic.   Mouth/Throat: No oropharyngeal exudate.   dylan tube   Eyes: EOM are normal. Pupils are equal, round, and reactive to light. Scleral icterus is present.   Neck: Normal range of motion. Neck supple. No JVD present. No thyromegaly present.   Cardiovascular: Normal rate, regular rhythm, normal heart sounds and intact distal pulses.    No murmur heard.  Pulmonary/Chest: Effort normal. No respiratory distress. He has decreased breath sounds in the right middle field, the right lower field and the left lower field. He has no wheezes. He exhibits no tenderness.   Diminished to RLL   Abdominal: Soft. Bowel sounds are normal. He exhibits distension and ascites. There is tenderness. There is no rebound and no guarding.   Dressing to chevron.  Wd vac removed 1/12/18  Dependent edema present R flank; surrounding erythema   Musculoskeletal: Normal range of motion. He exhibits edema (2+ LE edema). He exhibits no tenderness.   Neurological: He is alert and oriented to person, place, and time. He has normal reflexes.   Skin: Skin is warm and dry. Capillary refill takes 2 to 3 seconds. He is not diaphoretic. No erythema.   jaundice   Psychiatric: He has a normal mood and affect. His behavior is normal. Judgment and thought content normal. His mood appears not anxious.   Depressed mood   Nursing note and vitals reviewed.      Laboratory:  Immunosuppressants         Stop Route Frequency     cycloSPORINE modified capsule 175 mg      -- Oral 2 times daily         CBC:   Recent Labs  Lab 03/05/18  0617   WBC 7.92   RBC 2.26*   HGB 7.0*   HCT 21.4*      MCV 95   MCH 31.0   MCHC 32.7     CMP:   Recent Labs  Lab 03/05/18  0617   GLU 92   CALCIUM 9.5   ALBUMIN 1.8*   PROT 5.3*      K 4.2   CO2 22*      BUN 59*   CREATININE 4.6*   ALKPHOS 124   ALT 6*   AST 42*   BILITOT 1.6*     Coagulation: No results for input(s): PT, INR, APTT in the last 168 hours.  Labs within the past 24 hours have been reviewed.    Diagnostic Results:  None    Assessment/Plan:     Biliary stricture of transplanted liver    - tbili with increase prompting ERCP 1/30 with stones and sludge, stents placed.  -  Repeat ERCP 2/19 as was having recurrent fevers with bilirubin stuck at 2.0-2.2 with overall unremarkable findings seen. Did place new larger stents. Bilirubin now improving. Will need repeat ERCP in 10 weeks.   - US 3/2 stent in CBD.        Liver transplanted    - Post op course complicated by fevers and hyperbilirubinemia.  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  - Liver US 1/8 showed 3.9 cm complex fluid collection anterior to right lobe and moderate nonspecific complex ascites inferior to transplant.   - IR placed drain 1/11/18, cell count negative for infection.  - PBS consulted. ERCP 1/17 with stone and sludge. Pt placed on Actigall - treated with 10 day course of abx.   - Bili elevated - liver u/s 1/28  - Liver biopsy 1/23 without rejection.  - consulted PBS for ERCP - Choledocholithiasis was found along with sludge, stents placed 1/30.  - ERCP 2/19, overall unremarkable findings. Did place new larger stents. Will need repeat ERCP in 10 weeks. Bilirubin improving.  - Liver US reviewed w mod ascites.  Stent in CBD.          Fever    - Started on vanc/cefepime for ERCP. Now transitioned to vanc/zosyn.  - Blood cultures sent, prelim NGTD. Repeat blood cultures and chest xray today 2/10.  - thoracentesis 2/1 - fluid negative for infection.  -  atelectasis noted on ct scan - start breathing tx & CPT.  - now cmv positive - started treatment 2/5/18.  - taken to OR 2/7 for exp lap - fluid collections drained, cultures pending.  - Patient with low grade fever 2/15, 100.3.  -CT A/P 2/15 with no evidence of IA fluid infection. Did show R pleural effusion s/p thoracentesis, cell count negative for infection, cx pending.  -Repeat blood cx 2/15 prelim NGTD, CMV PCR 2/15 negative   Complaining of abdominal pain. Is tender in RUQ.  -Remains with fevers  - Bilirubin remains elevated. Concerning for cholangitis. Antibiotics broadened 2/17. ID re consulted.   - ERCP  2/19, overall unremarkable findings. Did place new larger stents. Will need repeat ERCP in 10 weeks. Bilirubin is stable post ERCP.  -Tmax 101 2/21 PM again ID following. Plan to transition vanc/zosyn to ertapenem 2/22 as no improvement noted.   - Erta started 2/22.  -CT A/P obtained 2/26 with focal/loculated region of fluid in the lower right abdomen measuring approximately 8.7 x 5.5 cm.   -IR attempted drainage on fluid collection on 2/27, however, per IR,there was limited fluid interposed with bowel in the RLQ with no safe pocket seen for drainage.  -Tagged WBC scan 2/27, negative results.  -Remains with low grade fevers.   -Per transplant attending, will hold off on EGD at this time as concern anesthesia will put patient at further increase risk for seizure activity. Will plan for potenial EGD on Monday. Discussed with GI, appreciate their assistance.  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 EBV PCR negative. Adenovirus pending. Repeat CMV PCR negative.    -Back pain and abdominal pain persists. Having BMs 3/2 but none since.   - Has remained afebrile. ID following. Cont Ertapenem for now.           Nausea and vomiting    - h/o constipation, reports having regular BM's with bowel regimen.  - vomiting episode x 1 at home. Anti-emetics ordered PRN.  - KUB repeated 1/14/18 given increased  abdominal pain- mild, generalized bowel distention suggesting ileus.  Diet changed to clears as tolerated for bowel rest .  - ALEX tube placed 1/16 for tube feeds. Not tolerating tube feeds, stopped 1/17/18.  - D/C alex 1/19, now in place as of 1/25.  - chronic constipation, Cont bowel regimen.   - See protein calorie malnutrition.  - TF have been on hold since 2/20 for N/V. alex d/c'd 2/23 as not tolerating TF  - 2/24 changes: Increased Reglan to 10 mg, D/C atovaquone, transitioned cyclo to Prograf, change twyla to BID, check Keppra level  - GI consulted. Plan for EGD tomorrow as with continued nausea/vomiting with po intake. Appreciate GI recs.    - Cont to have nausea and vomiting w medications and eating.  Cont TPN.               Seizure    -Patient with stuttering speech, trouble recalling specific words when speaking. Does have hx of seizure (on keppra) previously while on prograf. Switched to prograf over the weekend from cyclosporine. Neuro exam unremarkable.   -CT head without contrast obtained, unremarkable.   -In light of these symptoms, prograf d/c, restart cyclosporine 3/1.  - remains w/o seizure.  Cont to have stutter/difficulty finding words.            Severe protein-calorie malnutrition    - Poor PO intake since transplant requiring short course of TPN during previous hospital stay.   - albumin remains decreased but appetite slowly improving per pt.   - Dietary consulted. Will need to closely monitor PO intake.   - supplements also ordered.   - ALEX with tube feeding start 1/16. Stopped 1/17/18, was not tolerating. Alex removed 1/20.  - prealbumin 7 on 1/15.  - TPN started 1/16/18.   - Pt able to eat minimally 1/21 and 1/22 which is an improvement.  - GI consulted - EGD reviewed, increased ppi bid, miralax bid, and metamucil bid, decreased narcotics.  - continue reglan, placed ALEX tube with tube feeds - transitioned to nightly feeds. Not tolerating well due to nausea.   - small bowel follow through with  normal findings.   - consulted gi for possible gj tube placement, holding off on this for now as with ongoing fevers.  -Dietary changed tube feeds back to 24 hours from nightly 2/19 as not eating enough during the day to meet caloric needs. TF on hold since 2/20.  - retrial marinol 2/22 as pt reports that's the only thing helping his appetite, in the evening 2/2 concern for possible drowsiness  - Pt then developed persistent N/V again and TPN started 2/24. Cont TPN for now.   - Pt able to eat 1/4 sandwich without vomiting 2/25.  Not been able to eat anything n more than that in 48 hours per pt and mother.    - GI to perform EGD in am to further assess.         Prophylactic immunotherapy    - See long term use of immunosuppression.         Long-term use of immunosuppressant medication    - Maintenance IS with cyclosporine -->transitioned to Prograf 2/24 for continued nausea. However now with neurological symptoms with prograf and hx of seizure, prograf d/c and cyclosporine restarted 3/1.  - MMF on hold for infections and neutropenia.   - Continue to check cyclosporine and Prograf level daily during transition. Assess for toxicity and adjust level as needed.        At risk for opportunistic infections    - CMV detected 2/1 now on tx dose valcyte. CMV PCR 2/15 not detected. CMV PCR 2/22, negative   -Repeat CMV PCR 3/1 negative.    - Pentam given 2/24            Other neutropenia    - WBC stable today.   - Neupogen last given 3/1.           Other cytomegaloviral diseases    - detected at 370 on CMV PCR 2/1.  - case discussed with ID and will hold off on treatment at this time given low WBC.   - undetected CMV PCR 2/8.  - continue Valcyte 200 mg every Mon, Wed, Fri.  - CMV PCR 2/15 negative  - Per ID, valcyte dose decreased to ppx dose on 2/18.  - CMV PCR 2/22 negative  - Plan for repeat scope tomorrow to assess persistent N/V and possible CMV causing these symptoms.         Constipation    - Enema ordered 1/20 and 1/21  with BM.  - Encourage ambulation.  - Decrease narcotics.  - D/c metamucil and miralax bid as makes patient nauseated.  - Increase reglan qid.  - Small bowel follow through with normal findings.   - Schedule bisacodyl for bowel regimen.   - KUB 2/21 with significant amounts of stool and gas. Pt declines miralax, metamucil, colace.   -encouraged suppository.  - Improved on KUB 2/23  - Pt moving bowels now but will have to keep aggressive bowel regimen on board.         Other ascites    - Paracentesis performed 1/11/18 with 1600 ml removed.    - fluid negative for infection.   - Repeat para 1/19 negative for infection per cell count.  - no fluid seen for paracentesis on 1/23.  - Plan to re attempt paracentesis tomorrow to assess for peritonitis.         Recurrent pleural effusion on right    - CXR in ER with large right pleural effusion with subjective c/o worsening SOB  - thoracentesis completed- 1200 ml removed.  SOB with significant improvement.   - Cell count reviewed and negative for infection.   - resp even and non labored.  O2 sat 94-98%.  - Repeat thora 1/19 negative for infection per cell count.  - Reaccumulating fluid on xray 1/21.  - thoracentesis 1/31 - 1.4L removed, negative for infection  - Reaccumulating fluid again on xray 2/10.   -Thoracentesis performed  2/16 with 1500 cc off, cell count neg for infection, cx no growth to date.   -CT A/P 2/26 with large R pleural effusion, may need repeat thora if fevers reoccur.          Anemia of chronic disease    - H/H stable. Cont to monitor with daily cbc.         Delayed surgical wound healing    - wd vac removed 1/12/18.  Wound healing well. Aquacel AG and Mepilex border dressing applied via wound care recs.           Acute renal failure with tubular necrosis    - HD resumed on previous admission. Now anuric and dialyzes M-W-F.  - Nephrology following.    - ktm consulted for possible kidney transplant.            VTE Risk Mitigation         Ordered     heparin  (porcine) injection 1,000 Units  As needed (PRN)     Route:  Intra-Catheter        02/05/18 0945     heparin (porcine) injection 5,000 Units  Every 8 hours     Route:  Subcutaneous        01/31/18 0957     Medium Risk of VTE  Once      01/11/18 0351     Place sequential compression device  Until discontinued      01/11/18 0351          The patients clinical status was discussed at multidisplinary rounds, involving transplant surgery, transplant medicine, pharmacy, nursing, nutrition, and social work    Discharge Planning:  Not a candidate for d/c at this time.     Jin Perez, NP  Liver Transplant  [facility]

## 2018-03-06 ENCOUNTER — ANESTHESIA (OUTPATIENT)
Dept: ENDOSCOPY | Facility: HOSPITAL | Age: 28
DRG: 628 | End: 2018-03-06
Payer: COMMERCIAL

## 2018-03-06 LAB
ALBUMIN SERPL BCP-MCNC: 1.8 G/DL
ALP SERPL-CCNC: 142 U/L
ALT SERPL W/O P-5'-P-CCNC: 6 U/L
ANION GAP SERPL CALC-SCNC: 10 MMOL/L
ANISOCYTOSIS BLD QL SMEAR: SLIGHT
APPEARANCE FLD: CLEAR
AST SERPL-CCNC: 42 U/L
BASOPHILS # BLD AUTO: ABNORMAL K/UL
BASOPHILS NFR BLD: 0 %
BILIRUB SERPL-MCNC: 1.8 MG/DL
BODY FLD TYPE: NORMAL
BUN SERPL-MCNC: 43 MG/DL
CALCIUM SERPL-MCNC: 9.1 MG/DL
CHIMERISM  DONOR: NORMAL
CHIMERISM-RECIPIENT GERMLINE: NORMAL
CHLORIDE SERPL-SCNC: 103 MMOL/L
CO2 SERPL-SCNC: 24 MMOL/L
COLOR FLD: YELLOW
CREAT SERPL-MCNC: 3.5 MG/DL
CYCLOSPORINE BLD LC/MS/MS-MCNC: 334 NG/ML
DIFFERENTIAL METHOD: ABNORMAL
EOSINOPHIL # BLD AUTO: ABNORMAL K/UL
EOSINOPHIL NFR BLD: 0 %
ERYTHROCYTE [DISTWIDTH] IN BLOOD BY AUTOMATED COUNT: 17.6 %
EST. GFR  (AFRICAN AMERICAN): 25.9 ML/MIN/1.73 M^2
EST. GFR  (NON AFRICAN AMERICAN): 22.4 ML/MIN/1.73 M^2
GLUCOSE SERPL-MCNC: 86 MG/DL
GRAM STN SPEC: NORMAL
GRAM STN SPEC: NORMAL
HCT VFR BLD AUTO: 24.6 %
HGB BLD-MCNC: 8.1 G/DL
HYPOCHROMIA BLD QL SMEAR: ABNORMAL
IMM GRANULOCYTES # BLD AUTO: ABNORMAL K/UL
IMM GRANULOCYTES NFR BLD AUTO: ABNORMAL %
LYMPHOCYTES # BLD AUTO: ABNORMAL K/UL
LYMPHOCYTES NFR BLD: 11 %
LYMPHOCYTES NFR FLD MANUAL: 11 %
MAGNESIUM SERPL-MCNC: 1.9 MG/DL
MCH RBC QN AUTO: 30.5 PG
MCHC RBC AUTO-ENTMCNC: 32.9 G/DL
MCV RBC AUTO: 93 FL
MONOCYTES # BLD AUTO: ABNORMAL K/UL
MONOCYTES NFR BLD: 8 %
MONOS+MACROS NFR FLD MANUAL: 86 %
NEUTROPHILS NFR BLD: 81 %
NEUTROPHILS NFR FLD MANUAL: 3 %
NRBC BLD-RTO: 0 /100 WBC
OVALOCYTES BLD QL SMEAR: ABNORMAL
PHOSPHATE SERPL-MCNC: 3.3 MG/DL
PLATELET # BLD AUTO: 164 K/UL
PLATELET BLD QL SMEAR: ABNORMAL
PMV BLD AUTO: 11.2 FL
POIKILOCYTOSIS BLD QL SMEAR: SLIGHT
POLYCHROMASIA BLD QL SMEAR: ABNORMAL
POTASSIUM SERPL-SCNC: 3.9 MMOL/L
PROT SERPL-MCNC: 5.4 G/DL
RBC # BLD AUTO: 2.66 M/UL
SODIUM SERPL-SCNC: 137 MMOL/L
SPECIMEN TYPE: NORMAL
WBC # BLD AUTO: 8.71 K/UL
WBC # FLD: 28 /CU MM

## 2018-03-06 PROCEDURE — 63600175 PHARM REV CODE 636 W HCPCS: Mod: NTX | Performed by: NURSE ANESTHETIST, CERTIFIED REGISTERED

## 2018-03-06 PROCEDURE — 63600175 PHARM REV CODE 636 W HCPCS: Performed by: PHYSICIAN ASSISTANT

## 2018-03-06 PROCEDURE — 27201012 HC FORCEPS, HOT/COLD, DISP: Performed by: INTERNAL MEDICINE

## 2018-03-06 PROCEDURE — 37000008 HC ANESTHESIA 1ST 15 MINUTES: Performed by: INTERNAL MEDICINE

## 2018-03-06 PROCEDURE — 25000003 PHARM REV CODE 250: Performed by: PHYSICIAN ASSISTANT

## 2018-03-06 PROCEDURE — 87040 BLOOD CULTURE FOR BACTERIA: CPT

## 2018-03-06 PROCEDURE — 25000003 PHARM REV CODE 250: Performed by: NURSE PRACTITIONER

## 2018-03-06 PROCEDURE — 43239 EGD BIOPSY SINGLE/MULTIPLE: CPT | Mod: ,,, | Performed by: INTERNAL MEDICINE

## 2018-03-06 PROCEDURE — 25000003 PHARM REV CODE 250: Performed by: SURGERY

## 2018-03-06 PROCEDURE — D9220A PRA ANESTHESIA: Mod: NTX,,, | Performed by: ANESTHESIOLOGY

## 2018-03-06 PROCEDURE — 87205 SMEAR GRAM STAIN: CPT

## 2018-03-06 PROCEDURE — 0DJ08ZZ INSPECTION OF UPPER INTESTINAL TRACT, VIA NATURAL OR ARTIFICIAL OPENING ENDOSCOPIC: ICD-10-PCS | Performed by: INTERNAL MEDICINE

## 2018-03-06 PROCEDURE — 83735 ASSAY OF MAGNESIUM: CPT

## 2018-03-06 PROCEDURE — 63600175 PHARM REV CODE 636 W HCPCS: Mod: JG | Performed by: SURGERY

## 2018-03-06 PROCEDURE — 87075 CULTR BACTERIA EXCEPT BLOOD: CPT

## 2018-03-06 PROCEDURE — 25000003 PHARM REV CODE 250: Mod: NTX | Performed by: NURSE ANESTHETIST, CERTIFIED REGISTERED

## 2018-03-06 PROCEDURE — A4217 STERILE WATER/SALINE, 500 ML: HCPCS | Performed by: NURSE PRACTITIONER

## 2018-03-06 PROCEDURE — 85027 COMPLETE CBC AUTOMATED: CPT

## 2018-03-06 PROCEDURE — 84100 ASSAY OF PHOSPHORUS: CPT

## 2018-03-06 PROCEDURE — 99233 SBSQ HOSP IP/OBS HIGH 50: CPT | Mod: ,,, | Performed by: NURSE PRACTITIONER

## 2018-03-06 PROCEDURE — 88305 TISSUE EXAM BY PATHOLOGIST: CPT | Performed by: PATHOLOGY

## 2018-03-06 PROCEDURE — 80158 DRUG ASSAY CYCLOSPORINE: CPT

## 2018-03-06 PROCEDURE — 43239 EGD BIOPSY SINGLE/MULTIPLE: CPT | Performed by: INTERNAL MEDICINE

## 2018-03-06 PROCEDURE — 20600001 HC STEP DOWN PRIVATE ROOM

## 2018-03-06 PROCEDURE — 89051 BODY FLUID CELL COUNT: CPT

## 2018-03-06 PROCEDURE — 80053 COMPREHEN METABOLIC PANEL: CPT

## 2018-03-06 PROCEDURE — 87205 SMEAR GRAM STAIN: CPT | Mod: 59

## 2018-03-06 PROCEDURE — 88305 TISSUE EXAM BY PATHOLOGIST: CPT | Mod: 26,,, | Performed by: PATHOLOGY

## 2018-03-06 PROCEDURE — 99900035 HC TECH TIME PER 15 MIN (STAT)

## 2018-03-06 PROCEDURE — 88342 IMHCHEM/IMCYTCHM 1ST ANTB: CPT | Mod: 26,,, | Performed by: PATHOLOGY

## 2018-03-06 PROCEDURE — 36415 COLL VENOUS BLD VENIPUNCTURE: CPT

## 2018-03-06 PROCEDURE — 85007 BL SMEAR W/DIFF WBC COUNT: CPT

## 2018-03-06 PROCEDURE — 94664 DEMO&/EVAL PT USE INHALER: CPT

## 2018-03-06 PROCEDURE — 87102 FUNGUS ISOLATION CULTURE: CPT

## 2018-03-06 PROCEDURE — 63600175 PHARM REV CODE 636 W HCPCS: Performed by: NURSE PRACTITIONER

## 2018-03-06 PROCEDURE — 37000009 HC ANESTHESIA EA ADD 15 MINS: Performed by: INTERNAL MEDICINE

## 2018-03-06 PROCEDURE — 87070 CULTURE OTHR SPECIMN AEROBIC: CPT

## 2018-03-06 PROCEDURE — B4185 PARENTERAL SOL 10 GM LIPIDS: HCPCS | Performed by: NURSE PRACTITIONER

## 2018-03-06 RX ORDER — PROPOFOL 10 MG/ML
VIAL (ML) INTRAVENOUS CONTINUOUS PRN
Status: DISCONTINUED | OUTPATIENT
Start: 2018-03-06 | End: 2018-03-06

## 2018-03-06 RX ORDER — PROPOFOL 10 MG/ML
VIAL (ML) INTRAVENOUS
Status: DISCONTINUED | OUTPATIENT
Start: 2018-03-06 | End: 2018-03-06

## 2018-03-06 RX ORDER — SODIUM CHLORIDE 9 MG/ML
INJECTION, SOLUTION INTRAVENOUS ONCE
Status: COMPLETED | OUTPATIENT
Start: 2018-03-07 | End: 2018-03-07

## 2018-03-06 RX ORDER — LIDOCAINE HCL/PF 100 MG/5ML
SYRINGE (ML) INTRAVENOUS
Status: DISCONTINUED | OUTPATIENT
Start: 2018-03-06 | End: 2018-03-06

## 2018-03-06 RX ORDER — SODIUM CHLORIDE 9 MG/ML
INJECTION, SOLUTION INTRAVENOUS
Status: DISCONTINUED | OUTPATIENT
Start: 2018-03-07 | End: 2018-03-09

## 2018-03-06 RX ORDER — OXYCODONE HYDROCHLORIDE 5 MG/1
5 TABLET ORAL ONCE
Status: COMPLETED | OUTPATIENT
Start: 2018-03-06 | End: 2018-03-06

## 2018-03-06 RX ORDER — SODIUM CHLORIDE 9 MG/ML
INJECTION, SOLUTION INTRAVENOUS CONTINUOUS PRN
Status: DISCONTINUED | OUTPATIENT
Start: 2018-03-06 | End: 2018-03-06

## 2018-03-06 RX ADMIN — ONDANSETRON HYDROCHLORIDE 4 MG: 2 INJECTION, SOLUTION INTRAMUSCULAR; INTRAVENOUS at 06:03

## 2018-03-06 RX ADMIN — METOCLOPRAMIDE HYDROCHLORIDE 10 MG: 5 SOLUTION ORAL at 03:03

## 2018-03-06 RX ADMIN — METOCLOPRAMIDE HYDROCHLORIDE 10 MG: 5 SOLUTION ORAL at 05:03

## 2018-03-06 RX ADMIN — SODIUM CHLORIDE: 9 INJECTION, SOLUTION INTRAVENOUS at 12:03

## 2018-03-06 RX ADMIN — SOYBEAN OIL 250 ML: 20 INJECTION, SOLUTION INTRAVENOUS at 08:03

## 2018-03-06 RX ADMIN — THERA TABS 1 TABLET: TAB at 12:03

## 2018-03-06 RX ADMIN — SODIUM CHLORIDE: 0.9 INJECTION, SOLUTION INTRAVENOUS at 11:03

## 2018-03-06 RX ADMIN — PROCHLORPERAZINE EDISYLATE 10 MG: 5 INJECTION INTRAMUSCULAR; INTRAVENOUS at 07:03

## 2018-03-06 RX ADMIN — SODIUM CHLORIDE 0.5 G: 0.9 INJECTION, SOLUTION INTRAVENOUS at 08:03

## 2018-03-06 RX ADMIN — URSODIOL 300 MG: 300 CAPSULE ORAL at 08:03

## 2018-03-06 RX ADMIN — METOCLOPRAMIDE HYDROCHLORIDE 10 MG: 5 SOLUTION ORAL at 08:03

## 2018-03-06 RX ADMIN — LEVETIRACETAM 500 MG: 100 SOLUTION ORAL at 12:03

## 2018-03-06 RX ADMIN — OXYCODONE HYDROCHLORIDE 10 MG: 5 TABLET ORAL at 05:03

## 2018-03-06 RX ADMIN — BISACODYL 10 MG: 5 TABLET, COATED ORAL at 12:03

## 2018-03-06 RX ADMIN — PROPOFOL 80 MG: 10 INJECTION, EMULSION INTRAVENOUS at 11:03

## 2018-03-06 RX ADMIN — HEPARIN SODIUM 5000 UNITS: 5000 INJECTION, SOLUTION INTRAVENOUS; SUBCUTANEOUS at 08:03

## 2018-03-06 RX ADMIN — CALCIUM GLUCONATE: 94 INJECTION, SOLUTION INTRAVENOUS at 09:03

## 2018-03-06 RX ADMIN — OXYCODONE HYDROCHLORIDE 10 MG: 5 TABLET ORAL at 06:03

## 2018-03-06 RX ADMIN — PROPOFOL 200 MCG/KG/MIN: 10 INJECTION, EMULSION INTRAVENOUS at 11:03

## 2018-03-06 RX ADMIN — CYCLOSPORINE 150 MG: 100 CAPSULE, LIQUID FILLED ORAL at 06:03

## 2018-03-06 RX ADMIN — DRONABINOL 2.5 MG: 2.5 CAPSULE ORAL at 06:03

## 2018-03-06 RX ADMIN — LEVETIRACETAM 500 MG: 100 SOLUTION ORAL at 08:03

## 2018-03-06 RX ADMIN — LEVOTHYROXINE SODIUM 75 MCG: 75 TABLET ORAL at 05:03

## 2018-03-06 RX ADMIN — OMEPRAZOLE 40 MG: 40 CAPSULE, DELAYED RELEASE ORAL at 07:03

## 2018-03-06 RX ADMIN — CYCLOSPORINE 175 MG: 100 CAPSULE, LIQUID FILLED ORAL at 12:03

## 2018-03-06 RX ADMIN — OXYCODONE HYDROCHLORIDE 5 MG: 5 TABLET ORAL at 12:03

## 2018-03-06 NOTE — NURSING
Spoke to NP re: Lyndsay has called for patient, should Cyclosporine be administered this close to EGD?  Per NP ok to hold until patient returns

## 2018-03-06 NOTE — ASSESSMENT & PLAN NOTE
- Enema ordered 1/20 and 1/21 with BM.  - Encourage ambulation.  - Decrease narcotics.  - D/c metamucil and miralax bid as makes patient nauseated.  - Increase reglan qid.  - Small bowel follow through with normal findings.   - Schedule bisacodyl for bowel regimen.   - KUB 2/21 with significant amounts of stool and gas. Pt declines miralax, metamucil, colace.   - encouraged suppository.  - Improved on KUB 2/23.  - Pt moving bowels now but will have to keep aggressive bowel regimen on board.   - Brown bomb 3/5 with BM x 1.

## 2018-03-06 NOTE — ASSESSMENT & PLAN NOTE
- CXR in ER with large right pleural effusion with subjective c/o worsening SOB.  - thoracentesis completed- 1200 ml removed.  SOB with significant improvement.   - Cell count reviewed and negative for infection.   - resp even and non labored.  O2 sat 94-98%.  - Repeat thora 1/19 negative for infection per cell count.  - Reaccumulating fluid on xray 1/21.  - thoracentesis 1/31 - 1.4L removed, negative for infection.  - Reaccumulating fluid again on xray 2/10.   - Thoracentesis performed  2/16 with 1500 cc off, cell count neg for infection, cx no growth to date.   - CT A/P 2/26 with large R pleural effusion, may need repeat thora if fevers reoccur.

## 2018-03-06 NOTE — ASSESSMENT & PLAN NOTE
- Started on vanc/cefepime for ERCP. Now transitioned to vanc/zosyn.  - Blood cultures sent, prelim NGTD. Repeat blood cultures and chest xray today 2/10.  - thoracentesis 2/1 - fluid negative for infection.  - atelectasis noted on ct scan - start breathing tx & CPT.  - now cmv positive - started treatment 2/5/18.  - taken to OR 2/7 for exp lap - fluid collections drained, cultures pending.  - Patient with low grade fever 2/15, 100.3.  - CT A/P 2/15 with no evidence of IA fluid infection. Did show R pleural effusion s/p thoracentesis, cell count negative for infection, cx pending.  - Repeat blood cx 2/15 prelim NGTD, CMV PCR 2/15 negative   Complaining of abdominal pain. Is tender in RUQ.  - Remains with fevers.  - Bilirubin remains elevated. Concerning for cholangitis. Antibiotics broadened 2/17. ID re consulted.   - ERCP  2/19, overall unremarkable findings. Did place new larger stents. Will need repeat ERCP in 10 weeks. Bilirubin is stable post ERCP.  -Tmax 101 2/21 PM again ID following. Plan to transition vanc/zosyn to ertapenem 2/22 as no improvement noted.   - Erta started 2/22.  - CT A/P obtained 2/26 with focal/loculated region of fluid in the lower right abdomen measuring approximately 8.7 x 5.5 cm.   - IR attempted drainage on fluid collection on 2/27, however, per IR,there was limited fluid interposed with bowel in the RLQ with no safe pocket seen for drainage.  -Tagged WBC scan 2/27, negative results.  - Remains with low grade fevers.   - Per transplant attending, will hold off on EGD at this time as concern anesthesia will put patient at further increase risk for seizure activity. Will plan for potenial EGD on Monday. Discussed with GI, appreciate their assistance.  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 EBV PCR negative. Adenovirus pending. Repeat CMV PCR negative.    - Back pain and abdominal pain persists. Last BM 3/5 with brown bomb.   - Has remained afebrile. ID following. Cont  Ertapenem for now.

## 2018-03-06 NOTE — ASSESSMENT & PLAN NOTE
- Paracentesis performed 1/11/18 with 1600 ml removed.    - fluid negative for infection.   - Repeat para 1/19 negative for infection per cell count.  - no fluid seen for paracentesis on 1/23.  - Plan to re attempt paracentesis today to assess for peritonitis. F/u wbc/cultures.

## 2018-03-06 NOTE — NURSING
Patient transported to Endo lab via stretcher, accompanied by staff.  TPN & Lipids infusing.  Monitor on return to [facility]86    1040: Anesthesia called w/ concerns re: mental status, somnolence.  Provided further information re: lethargy, word salad, slurred speech which is not new for this patient.

## 2018-03-06 NOTE — TRANSFER OF CARE
"Anesthesia Transfer of Care Note    Patient: Jhonny Diana    Procedure(s) Performed: Procedure(s) (LRB):  ESOPHAGOGASTRODUODENOSCOPY (EGD) (N/A)    Patient location: [facility] Surgical Floor (Transported by endoscopy RN and report given to nurse)    Anesthesia Type: general    Transport from OR: Transported from OR on room air with adequate spontaneous ventilation    Post pain: adequate analgesia    Post assessment: no apparent anesthetic complications and tolerated procedure well    Post vital signs: stable    Level of consciousness: awake, alert and oriented    Nausea/Vomiting: no nausea/vomiting    Complications: none    Transfer of care protocol was followed      Last vitals:   Visit Vitals  /75   Pulse 80   Temp 37.4 °C (99.4 °F)   Resp 20   Ht 5' 6" (1.676 m)   Wt 58.8 kg (129 lb 10.1 oz)   SpO2 99%   BMI 20.92 kg/m²     "

## 2018-03-06 NOTE — NURSING
Pt transported to IR for paracentesis via stretcher, accompanied by staff & mother (to assist with consents if needed).  TPN continues.  Monitor on return to [facility].

## 2018-03-06 NOTE — PT/OT/SLP PROGRESS
Physical Therapy      Patient Name:  Jhonny Diana   MRN:  [id number]    Patient not seen today secondary to pt off floor for procedure. PT unable to return at later time.  Will follow-up next scheduled date.    William Yost, PT   3/6/2018

## 2018-03-06 NOTE — ASSESSMENT & PLAN NOTE
- detected at 370 on CMV PCR 2/1.  - case discussed with ID and will hold off on treatment at this time given low WBC.   - undetected CMV PCR 2/8.  - continue Valcyte 200 mg every Mon, Wed, Fri.  - CMV PCR 2/15 negative  - Per ID, valcyte dose decreased to ppx dose on 2/18.  - CMV PCR 2/22 negative.  - Plan for repeat scope today to assess persistent N/V and possible CMV causing these symptoms.

## 2018-03-06 NOTE — PLAN OF CARE
Problem: Patient Care Overview  Goal: Plan of Care Review  - MRI ordered.  Attempt to tolerate without relaxant/sedatives due to continued somnolence - if unsuccessful, call for orders.  Spoke to MRI dept - they will call nursing when ready for patient due to long transport list  - Bed low & locked, call light in reach, nonslip socks in use, mother at bedside attentive to patient & assisting with all care  - 1x BM incontinence today, no urine reported  - EGD with biopsies done  - Diagnostic paracentesis done  - Pt w/o complaints of pain at this time, very sleepy.  Monitor neuro status closely  - Needs 1-step direction for all activities at this time, runs walker into wall when w/o instruction & won't turn unless directed, garbled speech continues intermittently & sometimes appears uncorrectable (yesterday was able to clear speech after 2-3 attempts) then patient loses interest in train of conversation, falls asleep during talking, needs close assist with ambulation due to unsteady stuttering gait, walking with toes pointed out & on balls of feet (& will not correct even with direction)  - Air mattress ordered by WC to prevent bed sores, assist with turns, TPN for nutrition due to disinterest in all foods offered  - No nausea reported, no vomiting this shift  - PT attempted to see patient, not able due to EGD then paracentesis  - Plan for HD tomorrow

## 2018-03-06 NOTE — PLAN OF CARE
Problem: Patient Care Overview  Goal: Plan of Care Review  Outcome: Ongoing (interventions implemented as appropriate)  Pt aao x 4. Speech slurred at times. Pt ambulates w/ walker. Mother at bedside. Brown bomb enema given as ordered, 1 bm produced. NPO @ mn for EGD/para in am. TPN @ 48, Lipids overnight. Iv abx continued. HD MWF. PRN pain med given x 1, additional 1x dose of 5 mg PO oxycodone given.

## 2018-03-06 NOTE — PROGRESS NOTES
03/06/18 1258   Vital Signs   Temp 98.3 °F (36.8 °C)   Temp src Axillary   Pulse 79   Heart Rate Source Monitor   Resp 16   SpO2 95 %   O2 Device (Oxygen Therapy) room air   /86   MAP (mmHg) 103   Pt returned to [facility] via stretcher, accompanied by Endosopy staff.  NS IVF infusing, pt answers to questions more garbled than prior to EGD.  Per transporting staff, pt received propofol (?) for sedation during procedure & ok to stop NS.  Approx 500 mL NS infused.  VS WNL.  Spoke to MARYBEL Crespo NP re: increased dysarthria & lethargy.  Monitor & team will see patient shortly.

## 2018-03-06 NOTE — PLAN OF CARE
Patient somnolent.  Arouses to voice, falls readily back asleep.  Not oriented to time.  Dr. Meredith with anesthesia made aware.

## 2018-03-06 NOTE — SUBJECTIVE & OBJECTIVE
Scheduled Meds:   [START ON 3/7/2018] sodium chloride 0.9%   Intravenous Once    bisacodyl  10 mg Oral Daily    cycloSPORINE modified  175 mg Oral BID    docusate sodium  100 mg Oral BID    dronabinol  2.5 mg Oral Daily    epoetin maurisio (PROCRIT) injection  10,000 Units Intravenous Every Mon, Wed, Fri    ertapenem (INVANZ) IVPB  500 mg Intravenous Q24H    fat emulsion 20%  250 mL Intravenous Once    heparin (porcine)  5,000 Units Subcutaneous Q8H    custom IVPB builder   Intravenous Q24H    levetiracetam oral soln  500 mg Oral BID    levothyroxine  75 mcg Oral Before breakfast    metoclopramide HCl  10 mg Oral QID (AC & HS)    multivitamin  1 tablet Oral Daily    omeprazole  40 mg Oral QAM    polyethylene glycol  17 g Oral BID    psyllium husk (aspartame)  3.4 g Oral BID    ursodiol  300 mg Oral BID     Continuous Infusions:   TPN ADULT CENTRAL LINE CUSTOM 48 mL/hr at 03/05/18 2044    TPN ADULT CENTRAL LINE CUSTOM       PRN Meds:sodium chloride, [START ON 3/7/2018] sodium chloride 0.9%, acetaminophen, albuterol-ipratropium 2.5mg-0.5mg/3mL, bisacodyl, dextrose 50%, dextrose 50%, glucagon (human recombinant), glucose, glucose, heparin (porcine), naloxone, ondansetron, ondansetron, oxyCODONE, prochlorperazine, simethicone, sodium chloride 0.9%    Review of Systems   Constitutional: Positive for activity change, appetite change, fatigue and fever. Negative for chills.   HENT: Negative.  Negative for congestion and facial swelling.    Eyes: Negative for pain, discharge and visual disturbance.   Respiratory: Negative for cough, chest tightness, shortness of breath and wheezing.    Cardiovascular: Negative for chest pain, palpitations and leg swelling.   Gastrointestinal: Positive for abdominal distention, abdominal pain and nausea. Negative for constipation.   Endocrine: Negative.    Genitourinary: Positive for decreased urine volume. Negative for difficulty urinating.        Anuric   Musculoskeletal:  Positive for back pain. Negative for arthralgias and myalgias.   Skin: Positive for color change and wound. Negative for rash.   Allergic/Immunologic: Positive for immunocompromised state.   Neurological: Positive for speech difficulty and weakness. Negative for dizziness, seizures and headaches.   Psychiatric/Behavioral: Positive for decreased concentration and dysphoric mood. Negative for confusion. The patient is not nervous/anxious.    All other systems reviewed and are negative.    Objective:     Vital Signs (Most Recent):  Temp: 98.3 °F (36.8 °C) (03/06/18 1258)  Pulse: 79 (03/06/18 1258)  Resp: 16 (03/06/18 1258)  BP: 129/86 (03/06/18 1258)  SpO2: 95 % (03/06/18 1258) Vital Signs (24h Range):  Temp:  [98.3 °F (36.8 °C)-99.5 °F (37.5 °C)] 98.3 °F (36.8 °C)  Pulse:  [77-89] 79  Resp:  [16-20] 16  SpO2:  [94 %-99 %] 95 %  BP: (121-138)/(70-86) 129/86     Weight: 58.8 kg (129 lb 10.1 oz)  Body mass index is 20.92 kg/m².    Intake/Output - Last 3 Shifts       03/04 0700 - 03/05 0659 03/05 0700 - 03/06 0659 03/06 0700 - 03/07 0659    P.O. 300 230     I.V. (mL/kg)   1100 (18.7)    Blood  308     Other  958     IV Piggyback 100 100      1059.7     Total Intake(mL/kg) 1016 (18.1) 2655.7 (45.2) 1100 (18.7)    Urine (mL/kg/hr) 275 (0.2) 0 (0)     Emesis/NG output 0 (0)      Other 0 (0) 3476 (2.5)     Stool 0 (0) 0 (0)     Blood 0 (0)      Total Output 275 3476      Net +741 -820.3 +1100           Urine Occurrence 0 x 0 x     Stool Occurrence 0 x 1 x     Emesis Occurrence 0 x            Physical Exam   Constitutional: He is oriented to person, place, and time. He appears well-developed. No distress.   Temporal and distal extremity muscle wasting   HENT:   Head: Normocephalic and atraumatic.   Mouth/Throat: No oropharyngeal exudate.   Eyes: EOM are normal. Pupils are equal, round, and reactive to light. Scleral icterus is present.   Neck: Normal range of motion. Neck supple. No JVD present. No thyromegaly present.    Cardiovascular: Normal rate, regular rhythm, normal heart sounds and intact distal pulses.    No murmur heard.  Pulmonary/Chest: Effort normal. No respiratory distress. He has decreased breath sounds in the right middle field, the right lower field and the left lower field. He has no wheezes. He exhibits no tenderness.   Diminished to RLL   Abdominal: Soft. Bowel sounds are normal. He exhibits distension and ascites. There is tenderness. There is no rebound and no guarding.   Dressing to chevron.  Wd vac removed 1/12/18  Dependent edema present R flank; surrounding erythema   Musculoskeletal: Normal range of motion. He exhibits edema (2+ LE edema). He exhibits no tenderness.   Neurological: He is alert and oriented to person, place, and time. He has normal reflexes.   Skin: Skin is warm and dry. Capillary refill takes 2 to 3 seconds. He is not diaphoretic. No erythema.   jaundice   Psychiatric: He has a normal mood and affect. His behavior is normal. Judgment and thought content normal. His mood appears not anxious.   Depressed mood   Nursing note and vitals reviewed.      Laboratory:  Immunosuppressants         Stop Route Frequency     cycloSPORINE modified capsule 175 mg      -- Oral 2 times daily        CBC:   Recent Labs  Lab 03/06/18  0500   WBC 8.71   RBC 2.66*   HGB 8.1*   HCT 24.6*      MCV 93   MCH 30.5   MCHC 32.9     CMP:   Recent Labs  Lab 03/06/18  0500   GLU 86   CALCIUM 9.1   ALBUMIN 1.8*   PROT 5.4*      K 3.9   CO2 24      BUN 43*   CREATININE 3.5*   ALKPHOS 142*   ALT 6*   AST 42*   BILITOT 1.8*     Labs within the past 24 hours have been reviewed.    Diagnostic Results:  I have personally reviewed all pertinent imaging studies.

## 2018-03-06 NOTE — ASSESSMENT & PLAN NOTE
- h/o constipation, reports having regular BM's with bowel regimen.  - vomiting episode x 1 at home. Anti-emetics ordered PRN.  - KUB repeated 1/14/18 given increased abdominal pain- mild, generalized bowel distention suggesting ileus.  Diet changed to clears as tolerated for bowel rest .  - SHANE tube placed 1/16 for tube feeds. Not tolerating tube feeds, stopped 1/17/18.  - D/C shane 1/19, now in place as of 1/25.  - chronic constipation, Cont bowel regimen.   - See protein calorie malnutrition.  - TF have been on hold since 2/20 for N/V. shane d/c'd 2/23 as not tolerating TF  - 2/24 changes: Increased Reglan to 10 mg, D/C atovaquone, transitioned cyclo to Prograf, change twyla to BID, check Keppra level  - GI consulted. Plan for EGD today as with continued nausea/vomiting with po intake. Appreciate GI recs.    - Cont to have nausea and vomiting w medications and eating.  Cont TPN.

## 2018-03-06 NOTE — ASSESSMENT & PLAN NOTE
- Poor PO intake since transplant requiring short course of TPN during previous hospital stay.   - albumin remains decreased but appetite slowly improving per pt.   - Dietary consulted. Will need to closely monitor PO intake.   - supplements also ordered.   - SHANE with tube feeding start 1/16. Stopped 1/17/18, was not tolerating. Shane removed 1/20.  - prealbumin 7 on 1/15.  - TPN started 1/16/18.   - Pt able to eat minimally 1/21 and 1/22 which is an improvement.  - GI consulted - EGD reviewed, increased ppi bid, miralax bid, and metamucil bid, decreased narcotics.  - continue reglan, placed SHANE tube with tube feeds - transitioned to nightly feeds. Not tolerating well due to nausea.   - small bowel follow through with normal findings.   - consulted gi for possible gj tube placement, holding off on this for now as with ongoing fevers.  - Dietary changed tube feeds back to 24 hours from nightly 2/19 as not eating enough during the day to meet caloric needs. TF on hold since 2/20.  - retrial marinol 2/22 as pt reports that's the only thing helping his appetite, in the evening 2/2 concern for possible drowsiness  - Pt then developed persistent N/V again and TPN started 2/24. Cont TPN for now.   - Pt able to eat 1/4 sandwich without vomiting 2/25.  Not been able to eat anything more than that in 48 hours per pt and mother.    - GI to perform EGD today to further assess.

## 2018-03-06 NOTE — PROGRESS NOTES
Patient tolerated procedure well, very little fluid noted per ultrasound. Specimen for labs collected per orders. Diagnostic paracentesis only due to lack of fluid.

## 2018-03-06 NOTE — ASSESSMENT & PLAN NOTE
- Maintenance IS with cyclosporine --> transitioned to Prograf 2/24 for continued nausea. However now with neurological symptoms with prograf and hx of seizure, prograf d/c and cyclosporine restarted 3/1.  - MMF on hold for infections and neutropenia.   - Continue to check cyclosporine and Prograf level daily during transition. Assess for toxicity and adjust level as needed.

## 2018-03-06 NOTE — ASSESSMENT & PLAN NOTE
- CMV detected 2/1 now on tx dose valcyte. CMV PCR 2/15 not detected. CMV PCR 2/22, negative.  - Repeat CMV PCR 3/1 negative.    - Pentam given 2/24.

## 2018-03-06 NOTE — PROGRESS NOTES
[facility]  Liver Transplant  Progress Note    Patient Name: Jhonny Diana  MRN: [id number]  Admission Date: 2018  Hospital Length of Stay: 54 days  Code Status: Full Code  Primary Care Provider: Primary Doctor No  Post-Operative Day: 138    ORGAN:   LIVER  Disease Etiology: Acute Alcoholic Hepatitis  Donor Type:    - Brain Death  CDC High Risk:   No  Donor CMV Status:   Donor CMV Status: Positive  Donor HBcAB:   Negative  Donor HCV Status:   Negative  Whole or Partial: Whole Liver  Biliary Anastomosis: End to End  Arterial Anatomy: Standard  Subjective:     History of Present Illness:  Jhonny Diana is a 28 y/o male with past medical history of alcoholic cirrhosis.  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 Found to have peritonitis in November (WBC 5000, 75% PNM) neg for bile leak. He was treated initially with vanc/cefepime. Repeat cell counts  with some improvement (WBC 1400, 45% PNM). He has undergone multiple paracenteses as well as abscess drainage of perihepatic fluid collections and treated with antimicrobial therapy but no positive cultures. Of note, biliary stricture also identified and ERCP performed on 2017 with sphincterotomy and biliary stent placed. Liver tests still have not normalized despite intervention, bilirubin and AP remain elevated. Other pertinent PMH current wound vac in place 2/2 wound infection, malnutrition requiring TPN for short course and ongoing hypoalbuminemia, and seizure activity while on prograf and has since been switched to cyclosporine without reoccurrence.  He presented to the ER for fever, abdominal pain, and N/V. He reports fever (103) for 1  day prior. Overnight, he developed N/V, reports small amount of green emesis with new left sided pain. He also endorses worsening SOB with exertion. He was scheduled as an outpatient for follow up paracentesis and IR drainage of fluid collection. CXR in ER shows large pleural effusion with subsegmental atelectasis. Infectious work up initiated in ER. His ANC is 900. Broad spectrum antibiotics initiated in ED. He denies chest pain, palpitations, diarrhea, constipation, or back pain. Denies any sick contacts.    Hospital Course:  Thoracentesis (1.2L off), Paracentesis (1.6L off), and IR drainage of fluid collection 1/11, all fluids negative for infection. ID consulted. Broad spectrum antibiotics d/c'd 1/15. Chronically malnourished with poor PO intake, prealbumin 7. Shane tube placed 1/15 for tube feedings.     ERCP 1/17 with sludge and a biliary stone which was removed and stent exchanged.  Remained with n/v; therefore, TF remained on hold and TPN continued.   Pt with fever s/p ERCP on 1/17 which continued until 1/19.  Vanc/cefepime restarted.  Blood cx 1/17 and 1/18 NGTD.  ID reconsulted.  Fungal markers sent.  CT C/A/P obtained.  With large R pleural effusion and ascites- both drained 1/19 and negative for infx per cell count. Pt afebrile 1/20. Liver biopsy 1/23 - without rejection.  EGD 1/25 - unremarkable for source of GI symptoms.  Resume diet along with other GI recommendations. SHANE tube placed for tube feeds.       2/22: Pt again spiked temp of 101 overnight, now down to 99. Resent Bcx overnight, prelim NGTD. Transitioned to Erta from vanc/zosyn per ID. Planning for tagged WBC scan on Monday. R sided abdominal erythema/dependent edema improving today.  TF have been on hold since 2/20. Will continue to hold today per pt request. Pt working on diet. KUB yesterday with significant stool and gas. Declines colace, miralax, metamucil, but willing to take daily bisacodyl. Pt agreeable to suppository today. If  suppository not successful can give mag citrate through dylan tonight. Will also retrial Marinol tonight as pt reported improvement in appetite previously. Will monitor closely for sedation.     2/23:  2 small BM 2/22.  Tolerated HD.  [place] removed.  Will attempt to eat this PM.      2/24:  Pt ate then vomited hamburger 2/23.  Will resume TPN.  D/C atovaquone, transition cyclo to Prograf, check Keppra level, increase Reglan to 0 mg, change twyla to BID for nausea.  Will give pentam today.    2/25:  TPN going well, pt ate 1/2 subway sandwhich without vomiting yesterday, will continue to monitor.  Plan to repeat EGD this week.  Pt with drop in H/h again and appears to be hemolyzing.  LDH elevated, hapto slightly low, LEE pending.  With neutropenia- Neupogen given.    2/26:  Pt with severe back pain s/p HD. Unclear if secondary to neupogen.  Repeat labs stable. Indium scan scheduled for 2/27- injection done today (back pain started prior). CT A/P obtained with fluid collection seen in pelvis.    2/27: Patient spiking fevers, 101. Indium scan, negative results. CT A/P obtained 2/26 with focal/loculated region of fluid in the lower right abdomen measuring approximately 8.7 x 5.5 cm. IR attempted drainage, however, per IR,there was  limited fluid interposed with bowel in the RLQ with no safe pocket seen for drainage.    2/28: Remains with low grade fevers. Neupogen given.     3/1: Patient with stuttering speech, trouble recalling specific words when speaking. Does have hx of seizure (on keppra) previously while on prograf. Switched to prograf 2/24 from cyclosporine. Neuro exam unremarkable. CT head without contrast obtained, unremarkable. In light of these symptoms, prograf d/c, cyclosporine restarted 3/1. Per transplant attending, held off on EGD at this time as concern anesthesia would put patient at further increase risk for seizure activity.     3/4: pt walked in room yesterday.  Had 1 BM.  No appetite still.  Cont TPN.   He reports swelling and back pain today.  He vomited after taking Oxy PO.  Fentanyl 12.5mg IV ordered.  Patient rested well afterward.  Remains afebrile.   ID following.   Cont Ertapenem.  Repeat blood and urine cx given continued malaise and stutter- blood cx prelim NGTD.  UA ok.  Urine cx pending.    Tentative plan for EGD on Monday to assess source of fevers/nausea/abdominal pain w bx for CMV and GVHD.  Will also reassess rather able to perform paracentesis.     3/5: no acute events overnight. Pt still not feeling well and with poor sleep overnight. Cont to have mumbling of speech at times but slightly better than before. Remains with abdominal pain but relieved with PO pain meds. Tolerated HD well today. Will plan for paracentesis and EGD tomorrow to assess for peritonitis and cause of persistent N/V respectively.     3/6: pt continues to have garbled/mumbled speech. EGD performed today and pt received sedation during procedure. Increased lethargy and dysarthria noted post procedure. VS stable. Paracentesis scheduled today as well to r/o peritonitis. Wbc count stable. Afebrile. Continue Ertapenem. HD yesterday and pt tolerated well, 2.5 L removed. Monitor.     Scheduled Meds:   [START ON 3/7/2018] sodium chloride 0.9%   Intravenous Once    bisacodyl  10 mg Oral Daily    cycloSPORINE modified  175 mg Oral BID    docusate sodium  100 mg Oral BID    dronabinol  2.5 mg Oral Daily    epoetin maurisio (PROCRIT) injection  10,000 Units Intravenous Every Mon, Wed, Fri    ertapenem (INVANZ) IVPB  500 mg Intravenous Q24H    fat emulsion 20%  250 mL Intravenous Once    heparin (porcine)  5,000 Units Subcutaneous Q8H    custom IVPB builder   Intravenous Q24H    levetiracetam oral soln  500 mg Oral BID    levothyroxine  75 mcg Oral Before breakfast    metoclopramide HCl  10 mg Oral QID (AC & HS)    multivitamin  1 tablet Oral Daily    omeprazole  40 mg Oral QAM    polyethylene glycol  17 g Oral BID    psyllium  husk (aspartame)  3.4 g Oral BID    ursodiol  300 mg Oral BID     Continuous Infusions:   TPN ADULT CENTRAL LINE CUSTOM 48 mL/hr at 03/05/18 2044    TPN ADULT CENTRAL LINE CUSTOM       PRN Meds:sodium chloride, [START ON 3/7/2018] sodium chloride 0.9%, acetaminophen, albuterol-ipratropium 2.5mg-0.5mg/3mL, bisacodyl, dextrose 50%, dextrose 50%, glucagon (human recombinant), glucose, glucose, heparin (porcine), naloxone, ondansetron, ondansetron, oxyCODONE, prochlorperazine, simethicone, sodium chloride 0.9%    Review of Systems   Constitutional: Positive for activity change, appetite change, fatigue and fever. Negative for chills.   HENT: Negative.  Negative for congestion and facial swelling.    Eyes: Negative for pain, discharge and visual disturbance.   Respiratory: Negative for cough, chest tightness, shortness of breath and wheezing.    Cardiovascular: Negative for chest pain, palpitations and leg swelling.   Gastrointestinal: Positive for abdominal distention, abdominal pain and nausea. Negative for constipation.   Endocrine: Negative.    Genitourinary: Positive for decreased urine volume. Negative for difficulty urinating.        Anuric   Musculoskeletal: Positive for back pain. Negative for arthralgias and myalgias.   Skin: Positive for color change and wound. Negative for rash.   Allergic/Immunologic: Positive for immunocompromised state.   Neurological: Positive for speech difficulty and weakness. Negative for dizziness, seizures and headaches.   Psychiatric/Behavioral: Positive for decreased concentration and dysphoric mood. Negative for confusion. The patient is not nervous/anxious.    All other systems reviewed and are negative.    Objective:     Vital Signs (Most Recent):  Temp: 98.3 °F (36.8 °C) (03/06/18 1258)  Pulse: 79 (03/06/18 1258)  Resp: 16 (03/06/18 1258)  BP: 129/86 (03/06/18 1258)  SpO2: 95 % (03/06/18 1258) Vital Signs (24h Range):  Temp:  [98.3 °F (36.8 °C)-99.5 °F (37.5 °C)] 98.3 °F  (36.8 °C)  Pulse:  [77-89] 79  Resp:  [16-20] 16  SpO2:  [94 %-99 %] 95 %  BP: (121-138)/(70-86) 129/86     Weight: 58.8 kg (129 lb 10.1 oz)  Body mass index is 20.92 kg/m².    Intake/Output - Last 3 Shifts       03/04 0700 - 03/05 0659 03/05 0700 - 03/06 0659 03/06 0700 - 03/07 0659    P.O. 300 230     I.V. (mL/kg)   1100 (18.7)    Blood  308     Other  958     IV Piggyback 100 100      1059.7     Total Intake(mL/kg) 1016 (18.1) 2655.7 (45.2) 1100 (18.7)    Urine (mL/kg/hr) 275 (0.2) 0 (0)     Emesis/NG output 0 (0)      Other 0 (0) 3476 (2.5)     Stool 0 (0) 0 (0)     Blood 0 (0)      Total Output 275 3476      Net +741 -820.3 +1100           Urine Occurrence 0 x 0 x     Stool Occurrence 0 x 1 x     Emesis Occurrence 0 x            Physical Exam   Constitutional: He is oriented to person, place, and time. He appears well-developed. No distress.   Temporal and distal extremity muscle wasting   HENT:   Head: Normocephalic and atraumatic.   Mouth/Throat: No oropharyngeal exudate.   Eyes: EOM are normal. Pupils are equal, round, and reactive to light. Scleral icterus is present.   Neck: Normal range of motion. Neck supple. No JVD present. No thyromegaly present.   Cardiovascular: Normal rate, regular rhythm, normal heart sounds and intact distal pulses.    No murmur heard.  Pulmonary/Chest: Effort normal. No respiratory distress. He has decreased breath sounds in the right middle field, the right lower field and the left lower field. He has no wheezes. He exhibits no tenderness.   Diminished to RLL   Abdominal: Soft. Bowel sounds are normal. He exhibits distension and ascites. There is tenderness. There is no rebound and no guarding.   Dressing to chevron.  Wd vac removed 1/12/18  Dependent edema present R flank; surrounding erythema   Musculoskeletal: Normal range of motion. He exhibits edema (2+ LE edema). He exhibits no tenderness.   Neurological: He is alert and oriented to person, place, and time. He has  normal reflexes.   Skin: Skin is warm and dry. Capillary refill takes 2 to 3 seconds. He is not diaphoretic. No erythema.   jaundice   Psychiatric: He has a normal mood and affect. His behavior is normal. Judgment and thought content normal. His mood appears not anxious.   Depressed mood   Nursing note and vitals reviewed.      Laboratory:  Immunosuppressants         Stop Route Frequency     cycloSPORINE modified capsule 175 mg      -- Oral 2 times daily        CBC:   Recent Labs  Lab 03/06/18  0500   WBC 8.71   RBC 2.66*   HGB 8.1*   HCT 24.6*      MCV 93   MCH 30.5   MCHC 32.9     CMP:   Recent Labs  Lab 03/06/18  0500   GLU 86   CALCIUM 9.1   ALBUMIN 1.8*   PROT 5.4*      K 3.9   CO2 24      BUN 43*   CREATININE 3.5*   ALKPHOS 142*   ALT 6*   AST 42*   BILITOT 1.8*     Labs within the past 24 hours have been reviewed.    Diagnostic Results:  I have personally reviewed all pertinent imaging studies.    Assessment/Plan:     Delayed surgical wound healing    - wd vac removed 1/12/18.  Wound healing well. Aquacel AG and Mepilex border dressing applied via wound care recs.           Liver transplanted    - Post op course complicated by fevers and hyperbilirubinemia.  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  - Liver US 1/8 showed 3.9 cm complex fluid collection anterior to right lobe and moderate nonspecific complex ascites inferior to transplant.   - IR placed drain 1/11/18, cell count negative for infection.  - PBS consulted. ERCP 1/17 with stone and sludge. Pt placed on Actigall - treated with 10 day course of abx.   - Bili elevated - liver u/s 1/28.  - Liver biopsy 1/23 without rejection.  - consulted PBS for ERCP - Choledocholithiasis was found along with sludge, stents placed 1/30.   - ERCP 2/19, overall unremarkable findings. Did place new larger stents. Will need repeat ERCP in 10 weeks. Bilirubin improving.  - Liver US reviewed w mod ascites.  Stent in CBD.    -  Paracentesis today to r/o peritonitis.         Long-term use of immunosuppressant medication    - Maintenance IS with cyclosporine --> transitioned to Prograf 2/24 for continued nausea. However now with neurological symptoms with prograf and hx of seizure, prograf d/c and cyclosporine restarted 3/1.  - MMF on hold for infections and neutropenia.   - Continue to check cyclosporine and Prograf level daily during transition. Assess for toxicity and adjust level as needed.        Prophylactic immunotherapy    - See long term use of immunosuppression.         At risk for opportunistic infections    - CMV detected 2/1 now on tx dose valcyte. CMV PCR 2/15 not detected. CMV PCR 2/22, negative.  - Repeat CMV PCR 3/1 negative.    - Pentam given 2/24.            Seizure    - Patient with stuttering speech, trouble recalling specific words when speaking. Does have hx of seizure (on keppra) previously while on prograf. Switched to prograf over the weekend from cyclosporine. Neuro exam unremarkable.   - CT head without contrast obtained, unremarkable.   - In light of these symptoms, prograf d/c, restart cyclosporine 3/1.  - remains w/o seizure.  Cont to have stutter/garbled speech/difficulty finding words.  Worse post EGD as sedation was given.  - monitor closely. VSS.          Anemia of chronic disease    - H&H stable. Cont to monitor with daily cbc.         Other neutropenia    - WBC stable today.   - Neupogen last given 3/1.           Other cytomegaloviral diseases    - detected at 370 on CMV PCR 2/1.  - case discussed with ID and will hold off on treatment at this time given low WBC.   - undetected CMV PCR 2/8.  - continue Valcyte 200 mg every Mon, Wed, Fri.  - CMV PCR 2/15 negative  - Per ID, valcyte dose decreased to ppx dose on 2/18.  - CMV PCR 2/22 negative.  - Plan for repeat scope today to assess persistent N/V and possible CMV causing these symptoms.         Fever    - Started on vanc/cefepime for ERCP. Now  transitioned to vanc/zosyn.  - Blood cultures sent, prelim NGTD. Repeat blood cultures and chest xray today 2/10.  - thoracentesis 2/1 - fluid negative for infection.  - atelectasis noted on ct scan - start breathing tx & CPT.  - now cmv positive - started treatment 2/5/18.  - taken to OR 2/7 for exp lap - fluid collections drained, cultures pending.  - Patient with low grade fever 2/15, 100.3.  - CT A/P 2/15 with no evidence of IA fluid infection. Did show R pleural effusion s/p thoracentesis, cell count negative for infection, cx pending.  - Repeat blood cx 2/15 prelim NGTD, CMV PCR 2/15 negative   Complaining of abdominal pain. Is tender in RUQ.  - Remains with fevers.  - Bilirubin remains elevated. Concerning for cholangitis. Antibiotics broadened 2/17. ID re consulted.   - ERCP  2/19, overall unremarkable findings. Did place new larger stents. Will need repeat ERCP in 10 weeks. Bilirubin is stable post ERCP.  -Tmax 101 2/21 PM again ID following. Plan to transition vanc/zosyn to ertapenem 2/22 as no improvement noted.   - Erta started 2/22.  - CT A/P obtained 2/26 with focal/loculated region of fluid in the lower right abdomen measuring approximately 8.7 x 5.5 cm.   - IR attempted drainage on fluid collection on 2/27, however, per IR,there was limited fluid interposed with bowel in the RLQ with no safe pocket seen for drainage.  -Tagged WBC scan 2/27, negative results.  - Remains with low grade fevers.   - Per transplant attending, will hold off on EGD at this time as concern anesthesia will put patient at further increase risk for seizure activity. Will plan for potenial EGD on Monday. Discussed with GI, appreciate their assistance.  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 EBV PCR negative. Adenovirus pending. Repeat CMV PCR negative.    - Back pain and abdominal pain persists. Last BM 3/5 with brown bomb.   - Has remained afebrile. ID following. Cont Ertapenem for now.           Constipation    -  Enema ordered 1/20 and 1/21 with BM.  - Encourage ambulation.  - Decrease narcotics.  - D/c metamucil and miralax bid as makes patient nauseated.  - Increase reglan qid.  - Small bowel follow through with normal findings.   - Schedule bisacodyl for bowel regimen.   - KUB 2/21 with significant amounts of stool and gas. Pt declines miralax, metamucil, colace.   - encouraged suppository.  - Improved on KUB 2/23.  - Pt moving bowels now but will have to keep aggressive bowel regimen on board.   - Brown bomb 3/5 with BM x 1.        Other ascites    - Paracentesis performed 1/11/18 with 1600 ml removed.    - fluid negative for infection.   - Repeat para 1/19 negative for infection per cell count.  - no fluid seen for paracentesis on 1/23.  - Plan to re attempt paracentesis today to assess for peritonitis. F/u wbc/cultures.        Severe protein-calorie malnutrition    - Poor PO intake since transplant requiring short course of TPN during previous hospital stay.   - albumin remains decreased but appetite slowly improving per pt.   - Dietary consulted. Will need to closely monitor PO intake.   - supplements also ordered.   - SHANE with tube feeding start 1/16. Stopped 1/17/18, was not tolerating. [place] removed 1/20.  - prealbumin 7 on 1/15.  - TPN started 1/16/18.   - Pt able to eat minimally 1/21 and 1/22 which is an improvement.  - GI consulted - EGD reviewed, increased ppi bid, miralax bid, and metamucil bid, decreased narcotics.  - continue reglan, placed SHANE tube with tube feeds - transitioned to nightly feeds. Not tolerating well due to nausea.   - small bowel follow through with normal findings.   - consulted gi for possible gj tube placement, holding off on this for now as with ongoing fevers.  - Dietary changed tube feeds back to 24 hours from nightly 2/19 as not eating enough during the day to meet caloric needs. TF on hold since 2/20.  - retrial marinol 2/22 as pt reports that's the only thing helping his appetite,  in the evening 2/2 concern for possible drowsiness  - Pt then developed persistent N/V again and TPN started 2/24. Cont TPN for now.   - Pt able to eat 1/4 sandwich without vomiting 2/25.  Not been able to eat anything more than that in 48 hours per pt and mother.    - GI to perform EGD today to further assess.         Nausea and vomiting    - h/o constipation, reports having regular BM's with bowel regimen.  - vomiting episode x 1 at home. Anti-emetics ordered PRN.  - KUB repeated 1/14/18 given increased abdominal pain- mild, generalized bowel distention suggesting ileus.  Diet changed to clears as tolerated for bowel rest .  - SHANE tube placed 1/16 for tube feeds. Not tolerating tube feeds, stopped 1/17/18.  - D/C shane 1/19, now in place as of 1/25.  - chronic constipation, Cont bowel regimen.   - See protein calorie malnutrition.  - TF have been on hold since 2/20 for N/V. shane d/c'd 2/23 as not tolerating TF  - 2/24 changes: Increased Reglan to 10 mg, D/C atovaquone, transitioned cyclo to Prograf, change twyla to BID, check Keppra level  - GI consulted. Plan for EGD today as with continued nausea/vomiting with po intake. Appreciate GI recs.    - Cont to have nausea and vomiting w medications and eating.  Cont TPN.               Recurrent pleural effusion on right    - CXR in ER with large right pleural effusion with subjective c/o worsening SOB.  - thoracentesis completed- 1200 ml removed.  SOB with significant improvement.   - Cell count reviewed and negative for infection.   - resp even and non labored.  O2 sat 94-98%.  - Repeat thora 1/19 negative for infection per cell count.  - Reaccumulating fluid on xray 1/21.  - thoracentesis 1/31 - 1.4L removed, negative for infection.  - Reaccumulating fluid again on xray 2/10.   - Thoracentesis performed  2/16 with 1500 cc off, cell count neg for infection, cx no growth to date.   - CT A/P 2/26 with large R pleural effusion, may need repeat thora if fevers reoccur.           Biliary stricture of transplanted liver    - tbili with increase prompting ERCP 1/30 with stones and sludge, stents placed.  - Repeat ERCP 2/19 as was having recurrent fevers with bilirubin stuck at 2.0-2.2 with overall unremarkable findings seen. Did place new larger stents. Bilirubin now improving. Will need repeat ERCP in 10 weeks.   - US 3/2 stent in CBD.        Acute renal failure with tubular necrosis    - HD resumed on previous admission. Now anuric and dialyzes M-W-F.  - Nephrology following.    - ktm consulted for possible kidney transplant.            VTE Risk Mitigation         Ordered     heparin (porcine) injection 1,000 Units  As needed (PRN)     Route:  Intra-Catheter        02/05/18 0945     heparin (porcine) injection 5,000 Units  Every 8 hours     Route:  Subcutaneous        01/31/18 0957     Medium Risk of VTE  Once      01/11/18 0351     Place sequential compression device  Until discontinued      01/11/18 0351          The patients clinical status was discussed at multidisplinary rounds, involving transplant surgery, transplant medicine, pharmacy, nursing, nutrition, and social work    Discharge Planning: not a candidate for dc at this time.  Monitor kidney function --> may need outpt HD  Monitor HH needs vs rehab for deconditioned status      Mandy Crespo NP  Liver Transplant  [facility]

## 2018-03-06 NOTE — PROGRESS NOTES
Follow up on chevron wound   Mother reports he is scheduled for a paracentesis today and just returned from EEG.   Wound length is slightly larger in length , but smaller in width.     03/06/18 1405   Pre-Sedation Assessment (with SaO2)   /80   Oxygen Therapy   SpO2 95 %   ECG   Pulse 81       Incision/Site 02/07/18 1510 Right Abdomen   Date First Assessed/Time First Assessed: 02/07/18 1510   Side: Right  Location: Abdomen   Incision WDL ex   Dressing Appearance Moist drainage;Intact   Drainage Amount Small   Drainage Characteristics/Odor Serous   Appearance Pink;Red;Moist   Periwound Area Macerated   Wound Edges Open   Wound Length (cm) 1   Wound Width (cm) 3.5   Depth (cm) 0.1   Care Cleansed with:;Sterile normal saline   Dressing Applied;Hydrocolloid     Continue same.  Zoë Murphy RN CWON  [id number]

## 2018-03-06 NOTE — ANESTHESIA POSTPROCEDURE EVALUATION
"Anesthesia Post Evaluation    Patient: Jhonny Diana    Procedure(s) Performed: Procedure(s) (LRB):  ESOPHAGOGASTRODUODENOSCOPY (EGD) (N/A)    Final Anesthesia Type: general  Patient location during evaluation: ICU  Patient participation: Yes- Able to Participate  Level of consciousness: awake, lethargic and responds to stimulation  Post-procedure vital signs: reviewed and stable  Pain management: adequate  Airway patency: patent  PONV status at discharge: No PONV  Anesthetic complications: no      Cardiovascular status: blood pressure returned to baseline and hemodynamically stable  Respiratory status: unassisted, spontaneous ventilation and room air  Hydration status: euvolemic  Follow-up not needed.        Visit Vitals  /75   Pulse 80   Temp 37.4 °C (99.4 °F)   Resp 20   Ht 5' 6" (1.676 m)   Wt 58.8 kg (129 lb 10.1 oz)   SpO2 99%   BMI 20.92 kg/m²       Pain/Diogenes Score: Pain Assessment Performed: Yes (3/6/2018 10:44 AM)  Presence of Pain: denies (3/6/2018 10:44 AM)  Pain Rating Prior to Med Admin: 10 (3/6/2018  5:35 AM)  Pain Rating Post Med Admin: 8 (3/6/2018  1:39 AM)      "

## 2018-03-06 NOTE — ASSESSMENT & PLAN NOTE
- Patient with stuttering speech, trouble recalling specific words when speaking. Does have hx of seizure (on keppra) previously while on prograf. Switched to prograf over the weekend from cyclosporine. Neuro exam unremarkable.   - CT head without contrast obtained, unremarkable.   - In light of these symptoms, prograf d/c, restart cyclosporine 3/1.  - remains w/o seizure.  Cont to have stutter/garbled speech/difficulty finding words.  Worse post EGD as sedation was given.  - monitor closely. VSS.

## 2018-03-06 NOTE — PROVATION PATIENT INSTRUCTIONS
Discharge Summary/Instructions after an Endoscopic Procedure  Patient Name: Jhonny Diana  Patient MRN: [id number]  Patient YOB: 1990 Tuesday, March 06, 2018  Ashkan Mccann MD  RESTRICTIONS:  During your procedure today, you received medications for sedation.  These   medications may affect your judgment, balance and coordination.  Therefore,   for 24 hours, you have the following restrictions:   - DO NOT drive a car, operate machinery, make legal/financial decisions,   sign important papers or drink alcohol.    ACTIVITY:  The following day: return to full activity including work, except no heavy   lifting, straining or running for 3 days if polyps were removed.  DIET:  Eat and drink normally unless instructed otherwise.     TREATMENT FOR COMMON SIDE EFFECTS:  - Mild abdominal pain, nausea, belching, bloating or excessive gas:  rest,   eat lightly and use a heating pad.  - Sore Throat: treat with throat lozenges and/or gargle with warm salt   water.  - Because air was used during the procedure, expelling large amounts of air   from your rectum or belching is normal.  - If a bowel prep was taken, you may not have a bowel movement for 1-3 days.    This is normal.  SYMPTOMS TO WATCH FOR AND REPORT TO YOUR PHYSICIAN:  1. Abdominal pain or bloating, other than gas cramps.  2. Chest pain.  3. Back pain.  4. Signs of infection such as: chills or fever occurring within 24 hours   after the procedure.  5. Rectal bleeding, which would show as bright red, maroon, or black stools.   (A tablespoon of blood from the rectum is not serious, especially if   hemorrhoids are present.)  6. Vomiting.  7. Weakness or dizziness.  GO DIRECTLY TO THE NEAREST EMERGENCY ROOM IF YOU HAVE ANY OF THE FOLLOWING:      Difficulty breathing  Chills and/or fever over 101 F   Persistent vomiting and/or vomiting blood   Severe abdominal pain   Severe chest pain   Black, tarry stools   Bleeding- more than one tablespoon   Any other symptom  or condition that you feel may need urgent attention  Your doctor recommends these additional instructions:  If any biopsies were taken, your doctors clinic will contact you in 1 to 2   weeks with any results.  We are waiting for your pathology results.  For questions, problems or results please call your physician - Ashkan Mccann MD at Work:  [phone].  [facility], EMERGENCY ROOM PHONE NUMBER: [phone]  IF A COMPLICATION OR EMERGENCY SITUATION ARISES AND YOU ARE UNABLE TO REACH   YOUR PHYSICIAN - GO DIRECTLY TO THE EMERGENCY ROOM.  Ashkan Mccann MD  3/6/2018 11:34:43 AM  This report has been verified and signed electronically.

## 2018-03-06 NOTE — H&P
Short Stay Endoscopy History and Physical    PCP - Primary Doctor No    Procedure - EGD  ASA - per anesthesia  Mallampati - per anesthesia  History of Anesthesia problems - no  Family history Anesthesia problems -  no   Plan of anesthesia - MAC    HPI:  This is a 27 y.o. male here for evaluation of nausea and abdominal pain        ROS:  Constitutional: No fevers, chills, No weight loss  CV: No chest pain  Pulm: No cough, No shortness of breath  Ophtho: No vision changes  GI: see HPI    Medical History:  has a past medical history of Alcoholic hepatitis with ascites; Alcoholic hepatitis with ascites; History of hematemesis (9/28/2017); Hypertension; and Renal disorder.    Surgical History:  has a past surgical history that includes Appendectomy; Liver transplant; Esophagogastroduodenoscopy; and ERCP.    Family History: family history is not on file.. Otherwise no colon cancer, inflammatory bowel disease, or GI malignancies.    Social History:  reports that he has quit smoking. His smoking use included Cigarettes. He started smoking about 12 months ago. He has a 10.00 pack-year smoking history. He has never used smokeless tobacco. He reports that he does not drink alcohol or use drugs.    Review of patient's allergies indicates:   Allergen Reactions    Bactrim [sulfamethoxazole-trimethoprim] Other (See Comments)     Mookie Trell Syndrome       Medications:   Prescriptions Prior to Admission   Medication Sig Dispense Refill Last Dose    aspirin (ECOTRIN) 81 MG EC tablet Take 1 tablet (81 mg total) by mouth once daily. 30 tablet 5 Taking    atovaquone (MEPRON) 750 mg/5 mL Susp Take 10 mLs (1,500 mg total) by mouth once daily. Stop on 4/17/18 300 mL 5 Taking    cycloSPORINE modified, NEORAL, 25 MG capsule Take 3 capsules (75 mg total) by mouth 2 (two) times daily. 90 capsule 5     docusate sodium (COLACE) 100 MG capsule Take 100 mg by mouth 3 (three) times daily as needed for Constipation.   Taking     ergocalciferol (ERGOCALCIFEROL) 50,000 unit Cap Take 1 capsule (50,000 Units total) by mouth every 7 days. 4 capsule 5 Taking    levETIRAcetam (KEPPRA) 500 MG Tab Take 1 tablet (500 mg total) by mouth 2 (two) times daily. 60 tablet 11 Taking    levothyroxine (SYNTHROID) 75 MCG tablet Take 1 tablet (75 mcg total) by mouth before breakfast. 30 tablet 11 Taking    mirtazapine (REMERON) 7.5 MG Tab Take 1 tablet (7.5 mg total) by mouth every evening. 30 tablet 11 Not Taking    multivitamin (THERAGRAN) tablet Take 1 tablet by mouth once daily. 30 tablet 11 Taking    ondansetron (ZOFRAN-ODT) 8 MG TbDL Take 1 tablet (8 mg total) by mouth every 8 (eight) hours as needed (nausea). 30 tablet 1     oxyCODONE (ROXICODONE) 10 mg Tab immediate release tablet Take 0.5-1 tablets (5-10 mg total) by mouth every 6 (six) hours as needed for Pain. 30 tablet 0     pantoprazole (PROTONIX) 40 MG tablet Take 1 tablet (40 mg total) by mouth once daily. 30 tablet 5 Taking    sodium bicarbonate 650 MG tablet Take 2 tablets (1,300 mg total) by mouth 2 (two) times daily. 120 tablet 2 Taking    thiamine 100 MG tablet Take 1 tablet (100 mg total) by mouth once daily. 30 tablet 6 Taking    ursodiol (ACTIGALL) 300 mg capsule Take 1 capsule (300 mg total) by mouth 2 (two) times daily. 60 capsule 11 Taking       Physical Exam:    Vital Signs:   Vitals:    03/06/18 1049   BP: 138/75   Pulse: 80   Resp: 20   Temp: 99.4 °F (37.4 °C)       General Appearance: fatigued and ill appearing  Eyes:    No scleral icterus  ENT: Neck supple, Lips, mucosa, and tongue normal; teeth and gums normal  Lungs: CTA anteriorly  Heart:  Regular rate, S1, S2 normal, no murmurs heard.  Abdomen: Protuberant, tender to palpation with mild peritoneal signs.      Labs:  Lab Results   Component Value Date    WBC 8.71 03/06/2018    HGB 8.1 (L) 03/06/2018    HCT 24.6 (L) 03/06/2018     03/06/2018    ALT 6 (L) 03/06/2018    AST 42 (H) 03/06/2018     03/06/2018     K 3.9 03/06/2018     03/06/2018    CREATININE 3.5 (H) 03/06/2018    BUN 43 (H) 03/06/2018    CO2 24 03/06/2018    TSH 5.589 (H) 02/26/2018    INR 1.0 02/16/2018    HGBA1C <4.0 (A) 10/18/2017         Assessment:  27 y.o. male with nausea and abdominal pain.    Plan:  Proceed with EGD today.  I have explained the risks and benefits of endoscopy procedures to the patient including but not limited to bleeding, perforation, infection, and death.  All questions answered.      Ashkan Mccann MD

## 2018-03-06 NOTE — ASSESSMENT & PLAN NOTE
- Post op course complicated by fevers and hyperbilirubinemia.  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  - Liver US 1/8 showed 3.9 cm complex fluid collection anterior to right lobe and moderate nonspecific complex ascites inferior to transplant.   - IR placed drain 1/11/18, cell count negative for infection.  - PBS consulted. ERCP 1/17 with stone and sludge. Pt placed on Actigall - treated with 10 day course of abx.   - Bili elevated - liver u/s 1/28.  - Liver biopsy 1/23 without rejection.  - consulted PBS for ERCP - Choledocholithiasis was found along with sludge, stents placed 1/30.   - ERCP 2/19, overall unremarkable findings. Did place new larger stents. Will need repeat ERCP in 10 weeks. Bilirubin improving.  - Liver US reviewed w mod ascites.  Stent in CBD.    - Paracentesis today to r/o peritonitis.

## 2018-03-07 PROBLEM — G93.40 ENCEPHALOPATHY: Status: ACTIVE | Noted: 2018-03-07

## 2018-03-07 LAB
25(OH)D3+25(OH)D2 SERPL-MCNC: 13 NG/ML
ALBUMIN SERPL BCP-MCNC: 1.8 G/DL
ALP SERPL-CCNC: 153 U/L
ALT SERPL W/O P-5'-P-CCNC: 6 U/L
ANION GAP SERPL CALC-SCNC: 14 MMOL/L
ANISOCYTOSIS BLD QL SMEAR: SLIGHT
AST SERPL-CCNC: 50 U/L
BASOPHILS # BLD AUTO: ABNORMAL K/UL
BASOPHILS NFR BLD: 2 %
BILIRUB SERPL-MCNC: 2.1 MG/DL
BUN SERPL-MCNC: 62 MG/DL
CA-I BLDV-SCNC: 1.07 MMOL/L
CALCIUM SERPL-MCNC: 9.8 MG/DL
CHLORIDE SERPL-SCNC: 102 MMOL/L
CO2 SERPL-SCNC: 21 MMOL/L
CREAT SERPL-MCNC: 4.1 MG/DL
CYCLOSPORINE BLD LC/MS/MS-MCNC: 399 NG/ML
DIFFERENTIAL METHOD: ABNORMAL
EOSINOPHIL # BLD AUTO: ABNORMAL K/UL
EOSINOPHIL NFR BLD: 0 %
ERYTHROCYTE [DISTWIDTH] IN BLOOD BY AUTOMATED COUNT: 17.2 %
EST. GFR  (AFRICAN AMERICAN): 21.4 ML/MIN/1.73 M^2
EST. GFR  (NON AFRICAN AMERICAN): 18.5 ML/MIN/1.73 M^2
FUNGUS SPEC CULT: NORMAL
GLUCOSE SERPL-MCNC: 82 MG/DL
HCT VFR BLD AUTO: 24.3 %
HGB BLD-MCNC: 8 G/DL
IMM GRANULOCYTES # BLD AUTO: ABNORMAL K/UL
IMM GRANULOCYTES NFR BLD AUTO: ABNORMAL %
LYMPHOCYTES # BLD AUTO: ABNORMAL K/UL
LYMPHOCYTES NFR BLD: 20 %
MAGNESIUM SERPL-MCNC: 2.1 MG/DL
MCH RBC QN AUTO: 31 PG
MCHC RBC AUTO-ENTMCNC: 32.9 G/DL
MCV RBC AUTO: 94 FL
MONOCYTES # BLD AUTO: ABNORMAL K/UL
MONOCYTES NFR BLD: 8 %
NEUTROPHILS NFR BLD: 66 %
NEUTS BAND NFR BLD MANUAL: 4 %
NRBC BLD-RTO: 0 /100 WBC
OVALOCYTES BLD QL SMEAR: ABNORMAL
PHOSPHATE SERPL-MCNC: 4 MG/DL
PLATELET # BLD AUTO: 166 K/UL
PMV BLD AUTO: 11.4 FL
POIKILOCYTOSIS BLD QL SMEAR: SLIGHT
POLYCHROMASIA BLD QL SMEAR: ABNORMAL
POTASSIUM SERPL-SCNC: 4 MMOL/L
PROT SERPL-MCNC: 5.5 G/DL
PTH-INTACT SERPL-MCNC: 67 PG/ML
RBC # BLD AUTO: 2.58 M/UL
SODIUM SERPL-SCNC: 137 MMOL/L
WBC # BLD AUTO: 8.69 K/UL

## 2018-03-07 PROCEDURE — 90935 HEMODIALYSIS ONE EVALUATION: CPT

## 2018-03-07 PROCEDURE — 25000003 PHARM REV CODE 250: Performed by: NURSE PRACTITIONER

## 2018-03-07 PROCEDURE — 84100 ASSAY OF PHOSPHORUS: CPT

## 2018-03-07 PROCEDURE — 83735 ASSAY OF MAGNESIUM: CPT

## 2018-03-07 PROCEDURE — 80053 COMPREHEN METABOLIC PANEL: CPT

## 2018-03-07 PROCEDURE — B4185 PARENTERAL SOL 10 GM LIPIDS: HCPCS | Performed by: NURSE PRACTITIONER

## 2018-03-07 PROCEDURE — 82330 ASSAY OF CALCIUM: CPT

## 2018-03-07 PROCEDURE — 82306 VITAMIN D 25 HYDROXY: CPT

## 2018-03-07 PROCEDURE — 99233 SBSQ HOSP IP/OBS HIGH 50: CPT | Mod: ,,, | Performed by: NURSE PRACTITIONER

## 2018-03-07 PROCEDURE — 25000003 PHARM REV CODE 250: Performed by: SURGERY

## 2018-03-07 PROCEDURE — 63600175 PHARM REV CODE 636 W HCPCS: Performed by: NURSE PRACTITIONER

## 2018-03-07 PROCEDURE — 85027 COMPLETE CBC AUTOMATED: CPT

## 2018-03-07 PROCEDURE — 25000003 PHARM REV CODE 250: Performed by: PHYSICIAN ASSISTANT

## 2018-03-07 PROCEDURE — 96372 THER/PROPH/DIAG INJ SC/IM: CPT

## 2018-03-07 PROCEDURE — 99232 SBSQ HOSP IP/OBS MODERATE 35: CPT | Mod: ,,, | Performed by: PSYCHIATRY & NEUROLOGY

## 2018-03-07 PROCEDURE — 63600175 PHARM REV CODE 636 W HCPCS: Performed by: PHYSICIAN ASSISTANT

## 2018-03-07 PROCEDURE — 82652 VIT D 1 25-DIHYDROXY: CPT

## 2018-03-07 PROCEDURE — 80158 DRUG ASSAY CYCLOSPORINE: CPT

## 2018-03-07 PROCEDURE — 63600175 PHARM REV CODE 636 W HCPCS: Mod: JG | Performed by: SURGERY

## 2018-03-07 PROCEDURE — 85007 BL SMEAR W/DIFF WBC COUNT: CPT

## 2018-03-07 PROCEDURE — 99233 SBSQ HOSP IP/OBS HIGH 50: CPT | Mod: ,,, | Performed by: INTERNAL MEDICINE

## 2018-03-07 PROCEDURE — A4217 STERILE WATER/SALINE, 500 ML: HCPCS | Performed by: NURSE PRACTITIONER

## 2018-03-07 PROCEDURE — 25000003 PHARM REV CODE 250: Performed by: INTERNAL MEDICINE

## 2018-03-07 PROCEDURE — 83970 ASSAY OF PARATHORMONE: CPT

## 2018-03-07 PROCEDURE — 20600001 HC STEP DOWN PRIVATE ROOM

## 2018-03-07 PROCEDURE — 90935 HEMODIALYSIS ONE EVALUATION: CPT | Mod: ,,, | Performed by: INTERNAL MEDICINE

## 2018-03-07 RX ADMIN — DOCUSATE SODIUM 100 MG: 100 CAPSULE, LIQUID FILLED ORAL at 09:03

## 2018-03-07 RX ADMIN — DRONABINOL 2.5 MG: 2.5 CAPSULE ORAL at 06:03

## 2018-03-07 RX ADMIN — ACETAMINOPHEN 650 MG: 325 TABLET, FILM COATED ORAL at 09:03

## 2018-03-07 RX ADMIN — LEVOTHYROXINE SODIUM 75 MCG: 75 TABLET ORAL at 05:03

## 2018-03-07 RX ADMIN — LEVETIRACETAM 500 MG: 100 SOLUTION ORAL at 09:03

## 2018-03-07 RX ADMIN — MAGNESIUM SULFATE HEPTAHYDRATE: 500 INJECTION, SOLUTION INTRAMUSCULAR; INTRAVENOUS at 09:03

## 2018-03-07 RX ADMIN — OXYCODONE HYDROCHLORIDE 10 MG: 5 TABLET ORAL at 12:03

## 2018-03-07 RX ADMIN — HEPARIN SODIUM 5000 UNITS: 5000 INJECTION, SOLUTION INTRAVENOUS; SUBCUTANEOUS at 01:03

## 2018-03-07 RX ADMIN — URSODIOL 300 MG: 300 CAPSULE ORAL at 09:03

## 2018-03-07 RX ADMIN — SOYBEAN OIL 250 ML: 20 INJECTION, SOLUTION INTRAVENOUS at 09:03

## 2018-03-07 RX ADMIN — HEPARIN SODIUM 5000 UNITS: 5000 INJECTION, SOLUTION INTRAVENOUS; SUBCUTANEOUS at 09:03

## 2018-03-07 RX ADMIN — HEPARIN SODIUM 5000 UNITS: 5000 INJECTION, SOLUTION INTRAVENOUS; SUBCUTANEOUS at 05:03

## 2018-03-07 RX ADMIN — METOCLOPRAMIDE HYDROCHLORIDE 10 MG: 5 SOLUTION ORAL at 01:03

## 2018-03-07 RX ADMIN — LEVETIRACETAM 500 MG: 100 SOLUTION ORAL at 01:03

## 2018-03-07 RX ADMIN — SODIUM CHLORIDE: 9 INJECTION, SOLUTION INTRAVENOUS at 12:03

## 2018-03-07 RX ADMIN — OXYCODONE HYDROCHLORIDE 10 MG: 5 TABLET ORAL at 11:03

## 2018-03-07 RX ADMIN — ERYTHROPOIETIN 10000 UNITS: 10000 INJECTION, SOLUTION INTRAVENOUS; SUBCUTANEOUS at 10:03

## 2018-03-07 RX ADMIN — METOCLOPRAMIDE HYDROCHLORIDE 10 MG: 5 SOLUTION ORAL at 09:03

## 2018-03-07 RX ADMIN — HEPARIN SODIUM 1000 UNITS: 1000 INJECTION, SOLUTION INTRAVENOUS; SUBCUTANEOUS at 11:03

## 2018-03-07 RX ADMIN — METOCLOPRAMIDE HYDROCHLORIDE 10 MG: 5 SOLUTION ORAL at 05:03

## 2018-03-07 RX ADMIN — OXYCODONE HYDROCHLORIDE 10 MG: 5 TABLET ORAL at 08:03

## 2018-03-07 RX ADMIN — SODIUM CHLORIDE 350 ML: 0.9 INJECTION, SOLUTION INTRAVENOUS at 10:03

## 2018-03-07 RX ADMIN — OMEPRAZOLE 40 MG: 40 CAPSULE, DELAYED RELEASE ORAL at 05:03

## 2018-03-07 RX ADMIN — URSODIOL 300 MG: 300 CAPSULE ORAL at 12:03

## 2018-03-07 RX ADMIN — CYCLOSPORINE 150 MG: 100 CAPSULE, LIQUID FILLED ORAL at 05:03

## 2018-03-07 RX ADMIN — OXYCODONE HYDROCHLORIDE 10 MG: 5 TABLET ORAL at 05:03

## 2018-03-07 RX ADMIN — METOCLOPRAMIDE HYDROCHLORIDE 10 MG: 5 SOLUTION ORAL at 06:03

## 2018-03-07 NOTE — SUBJECTIVE & OBJECTIVE
Past Medical History:   Diagnosis Date    Alcoholic hepatitis with ascites     Alcoholic hepatitis with ascites     History of hematemesis 9/28/2017    Hypertension     Renal disorder      Past Surgical History:   Procedure Laterality Date    APPENDECTOMY      ERCP      ESOPHAGOGASTRODUODENOSCOPY      LIVER TRANSPLANT       Review of patient's allergies indicates:   Allergen Reactions    Bactrim [sulfamethoxazole-trimethoprim] Other (See Comments)     Mookie Trell Syndrome     No current facility-administered medications on file prior to encounter.      Current Outpatient Prescriptions on File Prior to Encounter   Medication Sig    aspirin (ECOTRIN) 81 MG EC tablet Take 1 tablet (81 mg total) by mouth once daily.    atovaquone (MEPRON) 750 mg/5 mL Susp Take 10 mLs (1,500 mg total) by mouth once daily. Stop on 4/17/18    cycloSPORINE modified, NEORAL, 25 MG capsule Take 3 capsules (75 mg total) by mouth 2 (two) times daily.    docusate sodium (COLACE) 100 MG capsule Take 100 mg by mouth 3 (three) times daily as needed for Constipation.    ergocalciferol (ERGOCALCIFEROL) 50,000 unit Cap Take 1 capsule (50,000 Units total) by mouth every 7 days.    levETIRAcetam (KEPPRA) 500 MG Tab Take 1 tablet (500 mg total) by mouth 2 (two) times daily.    levothyroxine (SYNTHROID) 75 MCG tablet Take 1 tablet (75 mcg total) by mouth before breakfast.    mirtazapine (REMERON) 7.5 MG Tab Take 1 tablet (7.5 mg total) by mouth every evening.    multivitamin (THERAGRAN) tablet Take 1 tablet by mouth once daily.    ondansetron (ZOFRAN-ODT) 8 MG TbDL Take 1 tablet (8 mg total) by mouth every 8 (eight) hours as needed (nausea).    oxyCODONE (ROXICODONE) 10 mg Tab immediate release tablet Take 0.5-1 tablets (5-10 mg total) by mouth every 6 (six) hours as needed for Pain.    pantoprazole (PROTONIX) 40 MG tablet Take 1 tablet (40 mg total) by mouth once daily.    sodium bicarbonate 650 MG tablet Take 2 tablets (1,300  mg total) by mouth 2 (two) times daily.    thiamine 100 MG tablet Take 1 tablet (100 mg total) by mouth once daily.    ursodiol (ACTIGALL) 300 mg capsule Take 1 capsule (300 mg total) by mouth 2 (two) times daily.     Family History     Problem Relation (Age of Onset)    No Known Problems Mother        Social History Main Topics    Smoking status: Former Smoker     Packs/day: 1.00     Years: 10.00     Types: Cigarettes     Start date: 2/19/2017    Smokeless tobacco: Never Used    Alcohol use No      Comment: a fifth of liquor daily for years, cut back over last 2 months    Drug use: No    Sexual activity: Not on file     Review of Systems   Constitutional: Positive for activity change and fatigue.   HENT: Negative for tinnitus and trouble swallowing.    Respiratory: Negative for shortness of breath.    Musculoskeletal: Positive for gait problem.   Neurological: Positive for tremors, seizures, speech difficulty and weakness. Negative for dizziness, facial asymmetry, light-headedness and headaches.   Psychiatric/Behavioral: Positive for decreased concentration, dysphoric mood and sleep disturbance.     Objective:     Vital Signs (Most Recent):  Temp: 97.5 °F (36.4 °C) (03/07/18 1549)  Pulse: 84 (03/07/18 1549)  Resp: 17 (03/07/18 1549)  BP: 131/78 (03/07/18 1549)  SpO2: 96 % (03/07/18 1549) Vital Signs (24h Range):  Temp:  [97.5 °F (36.4 °C)-99.2 °F (37.3 °C)] 97.5 °F (36.4 °C)  Pulse:  [73-90] 84  Resp:  [16-20] 17  SpO2:  [95 %-99 %] 96 %  BP: (122-152)/(67-85) 131/78     Weight: 71.2 kg (156 lb 15.5 oz)  Body mass index is 25.34 kg/m².    Physical Exam   Neurological: He has a normal Finger-Nose-Finger Test.   Reflex Scores:       Bicep reflexes are 3+ on the right side and 3+ on the left side.       Brachioradialis reflexes are 3+ on the right side and 3+ on the left side.       Patellar reflexes are 3+ on the right side and 3+ on the left side.      NEUROLOGICAL EXAMINATION:     MENTAL STATUS   Oriented to  "person.   Oriented to place.   Follows 1 step commands.   Attention: decreased. Concentration: decreased.   Speech: (Low and mumbled )  Level of consciousness: drowsy ,  arousable by verbal stimuli  Able to repeat ("no ifs and or buts" and "its a lawrence day in [place]").     CRANIAL NERVES     CN II   Visual fields full to confrontation.     CN III, IV, VI   Right pupil: Shape: regular.   Left pupil: Shape: regular.   Nystagmus: none   Ophthalmoparesis: none    CN V   Facial sensation intact.     CN VII   Facial expression full, symmetric.     CN VIII   Hearing: intact    CN IX, X   Palate: symmetric    CN XII   CN XII normal.     MOTOR EXAM   Muscle bulk: decreased  Overall muscle tone: normal  Right arm pronator drift: absent  Left arm pronator drift: absent    Strength   Right deltoid: 4/5  Left deltoid: 4/5  Right biceps: 5/5  Left biceps: 5/5  Right triceps: 4/5  Left triceps: 4/5  Right wrist extension: 4/5  Left wrist extension: 4/5  Right interossei: 4/5  Left interossei: 4/5  Right iliopsoas: 4/5  Left iliopsoas: 4/5  Right anterior tibial: 5/5  Left anterior tibial: 5/5  Right posterior tibial: 5/5  Left posterior tibial: 5/5    REFLEXES     Reflexes   Right brachioradialis: 3+  Left brachioradialis: 3+  Right biceps: 3+  Left biceps: 3+  Right patellar: 3+  Left patellar: 3+       Brisk throughout with cross adduction      SENSORY EXAM   Light touch normal.     GAIT AND COORDINATION      Coordination   Finger to nose coordination: normal       Slow, cautious gait with walker  Postural tremor with FTN     Significant Labs:   Hemoglobin A1c: No results for input(s): HGBA1C in the last 720 hours.  Blood Culture:   Recent Labs  Lab 03/06/18  1631 03/06/18  1714   LABBLOO No Growth to date No Growth to date     CBC:   Recent Labs  Lab 03/06/18  0500 03/07/18  0500   WBC 8.71 8.69   HGB 8.1* 8.0*   HCT 24.6* 24.3*    166     CMP:   Recent Labs  Lab 03/06/18  0500 03/07/18  0500   GLU 86 82    137 "   K 3.9 4.0    102   CO2 24 21*   BUN 43* 62*   CREATININE 3.5* 4.1*   CALCIUM 9.1 9.8   MG 1.9 2.1   PROT 5.4* 5.5*   ALBUMIN 1.8* 1.8*   BILITOT 1.8* 2.1*   ALKPHOS 142* 153*   AST 42* 50*   ALT 6* 6*   ANIONGAP 10 14   EGFRNONAA 22.4* 18.5*     Inflammatory Markers: No results for input(s): SEDRATE, CRP, PROCAL in the last 48 hours.  Prealbumin: No results for input(s): PREALBUMIN in the last 48 hours.  Respiratory Culture: No results for input(s): GSRESP, RESPIRATORYC in the last 48 hours.  Urine Culture: No results for input(s): LABURIN in the last 48 hours.  Urine Studies: No results for input(s): COLORU, APPEARANCEUA, PHUR, SPECGRAV, PROTEINUA, GLUCUA, KETONESU, BILIRUBINUA, OCCULTUA, NITRITE, UROBILINOGEN, LEUKOCYTESUR, RBCUA, WBCUA, BACTERIA, SQUAMEPITHEL, HYALINECASTS in the last 48 hours.    Invalid input(s): WRIGHTSUR  All pertinent lab results from the past 24 hours have been reviewed.    Significant Imaging: I have reviewed and interpreted all pertinent imaging results/findings within the past 24 hours.     CT Head WO contrast (3/1/18):  No acute intracranial pathology    MRI Brain WO contrast (3/6/18):  The ventricles are normal in size for age, without evidence of hydrocephalus. The previously identified T2/FLAIR signal hyperintensity in the missy along with associated diffusion restriction has resolved. Stable single focus of T2/FLAIR signal hyperintensity in the right centrum semiovale, nonspecific.   No parenchymal mass, hemorrhage, edema or infarction.No extra-axial blood or fluid collections. The T2 skull base flow voids are preserved. Bone marrow signal intensity is unremarkable.The visualized paranasal sinuses and bilateral mastoid air cells are clear.

## 2018-03-07 NOTE — ASSESSMENT & PLAN NOTE
- detected at 370 on CMV PCR 2/1.  - case discussed with ID and will hold off on treatment at this time given low WBC.   - undetected CMV PCR 2/8.  - continue Valcyte 200 mg every Mon, Wed, Fri.  - CMV PCR 2/15 negative  - Per ID, valcyte dose decreased to ppx dose on 2/18.  - CMV PCR 2/22 negative.  - Repeat scope 3/6 to assess persistent N/V and possible CMV causing these symptoms. Biopsies pending.

## 2018-03-07 NOTE — ASSESSMENT & PLAN NOTE
Slurred and mumbled speech reported since 3/1 with increased lethargy, confusion, arm movements while sleeping and gait difficulty.   Complicated hospital course with ATN requiring dialysis, superficial wound infection with wound vac, multiple admissions for fevers, peritonitis, persistent ascites, biliary strictures requiring ERCP and biliary stenting, pleural effusion s/p thoracentesis and malnutrition on TPN.    On exam, patient has decreased attention and concentration. Low and mumbled speech, but able to repeat phrases without difficulty or apparent receptive aphasia. Suspect mental status change on 3/1 is due to recent infection with Abx and possible s/e from prograf complicated by ICU delirium after being admitted for 55 days. CT head and MRI brain without contrast were unremarkable for acute intracranial pathology.     ->continue delirium precautions with good sleep hygiene and regulation of sleep/wake cycle  ->encouraged to limit sedating medications and narcotics as able

## 2018-03-07 NOTE — PROGRESS NOTES
Patient transported down for dialysis, no acute distress noted at this time.  Speech remains slurred but answers are appropriate.  Will continue to monitor for patients return.

## 2018-03-07 NOTE — NURSING
Patient transported to MRI via stretcher, accompanied by RN, mother with patient to assist sign consents.  TPN paused for MRI, restart on return to TSU

## 2018-03-07 NOTE — CONSULTS
"[facility]  Neurology  Consult Note    Patient Name: Jhonny Diana  MRN: [id number]  Admission Date: 1/11/2018  Hospital Length of Stay: 55 days  Code Status: Full Code   Attending Provider: Nathanael Medina MD   Consulting Provider: Irena Flowers PA-C  Primary Care Physician: Primary Doctor No  Principal Problem:Fever    Inpatient consult to Neurology  Consult performed by: IRENA FLOWERS  Consult ordered by: ARVIND MALIK         Subjective:     Chief Complaint:  Confusion, lethargy, speech change     HPI:   27 y.o. Male with hx of alcoholic cirrhosis s/p liver transplant (10/19/17) complicated by seizures (prograf switched to cyclo) currently on Keppra 500 mg BID, ATN on HD (MWF), superficial wound infection s/p wound vac and multiple admissions for fevers, multiple episodes of peritonitis s/p ex lap (2/7/18), persistent ascites, biliary strictures requiring ERCP and biliary stenting, pleural effusion s/p thoracentesis, malnutrition on TPN, nodular gastric mucosa s/p gastric and duodenal bx (3/6/18) and liver bx (1/23/18) without evidence of rejection.     Neurology consulted 3/7/18 for worsening confusion, lethargy and garbled/slurred speech. Per chart review, cyclosporine was transitioned to prograf on 2/24/18 for nausea. On 3/1, patient developed "stuttering speech and word recall issues." CT Head without contrast was unremarkable and prograf was discontinued. Cyclosporine was restarted on 3/1. On 3/4 blood and urine cultures were repeated given continued malaise and stuttering. On 3/6, EGD was performed and patient received sedation and had increased lethargy and dysarthria afterwards. MRI brain without contrast was performed showing no acute intracranial pathology and resolution of previously seen signal abnormality in the central missy.     Patient's mom reports speech change occurred at time prograf was restarted, but also with initiation of Abx. Speech has been described as mumbled " and low. He has substituted words that sound similar and had word finding difficulty. Mom feels he has moments where he zones out and talks during his sleep. He has also been doing arm movements like he is manipulating something while he is sleeping. His gait has worsened recently where is has trouble getting from his bed to the door. Previously he was able to walk unassisted to PJs downstairs. He has been walking on his tip toes and complaining of left achilles pain. Per ID note, they recommended d/c ertapenem given new speech changes and intermittent twitching.      Past Medical History:   Diagnosis Date    Alcoholic hepatitis with ascites     Alcoholic hepatitis with ascites     History of hematemesis 9/28/2017    Hypertension     Renal disorder      Past Surgical History:   Procedure Laterality Date    APPENDECTOMY      ERCP      ESOPHAGOGASTRODUODENOSCOPY      LIVER TRANSPLANT       Review of patient's allergies indicates:   Allergen Reactions    Bactrim [sulfamethoxazole-trimethoprim] Other (See Comments)     Mookie Trell Syndrome     No current facility-administered medications on file prior to encounter.      Current Outpatient Prescriptions on File Prior to Encounter   Medication Sig    aspirin (ECOTRIN) 81 MG EC tablet Take 1 tablet (81 mg total) by mouth once daily.    atovaquone (MEPRON) 750 mg/5 mL Susp Take 10 mLs (1,500 mg total) by mouth once daily. Stop on 4/17/18    cycloSPORINE modified, NEORAL, 25 MG capsule Take 3 capsules (75 mg total) by mouth 2 (two) times daily.    docusate sodium (COLACE) 100 MG capsule Take 100 mg by mouth 3 (three) times daily as needed for Constipation.    ergocalciferol (ERGOCALCIFEROL) 50,000 unit Cap Take 1 capsule (50,000 Units total) by mouth every 7 days.    levETIRAcetam (KEPPRA) 500 MG Tab Take 1 tablet (500 mg total) by mouth 2 (two) times daily.    levothyroxine (SYNTHROID) 75 MCG tablet Take 1 tablet (75 mcg total) by mouth before breakfast.     mirtazapine (REMERON) 7.5 MG Tab Take 1 tablet (7.5 mg total) by mouth every evening.    multivitamin (THERAGRAN) tablet Take 1 tablet by mouth once daily.    ondansetron (ZOFRAN-ODT) 8 MG TbDL Take 1 tablet (8 mg total) by mouth every 8 (eight) hours as needed (nausea).    oxyCODONE (ROXICODONE) 10 mg Tab immediate release tablet Take 0.5-1 tablets (5-10 mg total) by mouth every 6 (six) hours as needed for Pain.    pantoprazole (PROTONIX) 40 MG tablet Take 1 tablet (40 mg total) by mouth once daily.    sodium bicarbonate 650 MG tablet Take 2 tablets (1,300 mg total) by mouth 2 (two) times daily.    thiamine 100 MG tablet Take 1 tablet (100 mg total) by mouth once daily.    ursodiol (ACTIGALL) 300 mg capsule Take 1 capsule (300 mg total) by mouth 2 (two) times daily.     Family History     Problem Relation (Age of Onset)    No Known Problems Mother        Social History Main Topics    Smoking status: Former Smoker     Packs/day: 1.00     Years: 10.00     Types: Cigarettes     Start date: 2/19/2017    Smokeless tobacco: Never Used    Alcohol use No      Comment: a fifth of liquor daily for years, cut back over last 2 months    Drug use: No    Sexual activity: Not on file     Review of Systems   Constitutional: Positive for activity change and fatigue.   HENT: Negative for tinnitus and trouble swallowing.    Respiratory: Negative for shortness of breath.    Musculoskeletal: Positive for gait problem.   Neurological: Positive for tremors, seizures, speech difficulty and weakness. Negative for dizziness, facial asymmetry, light-headedness and headaches.   Psychiatric/Behavioral: Positive for decreased concentration, dysphoric mood and sleep disturbance.     Objective:     Vital Signs (Most Recent):  Temp: 97.5 °F (36.4 °C) (03/07/18 1549)  Pulse: 84 (03/07/18 1549)  Resp: 17 (03/07/18 1549)  BP: 131/78 (03/07/18 1549)  SpO2: 96 % (03/07/18 1549) Vital Signs (24h Range):  Temp:  [97.5 °F (36.4 °C)-99.2 °F  "(37.3 °C)] 97.5 °F (36.4 °C)  Pulse:  [73-90] 84  Resp:  [16-20] 17  SpO2:  [95 %-99 %] 96 %  BP: (122-152)/(67-85) 131/78     Weight: 71.2 kg (156 lb 15.5 oz)  Body mass index is 25.34 kg/m².    Physical Exam   Neurological: He has a normal Finger-Nose-Finger Test.   Reflex Scores:       Bicep reflexes are 3+ on the right side and 3+ on the left side.       Brachioradialis reflexes are 3+ on the right side and 3+ on the left side.       Patellar reflexes are 3+ on the right side and 3+ on the left side.      NEUROLOGICAL EXAMINATION:     MENTAL STATUS   Oriented to person.   Oriented to place.   Follows 1 step commands.   Attention: decreased. Concentration: decreased.   Speech: (Low and mumbled )  Level of consciousness: drowsy ,  arousable by verbal stimuli  Able to repeat ("no ifs and or buts" and "its a lawrence day in [place]").     CRANIAL NERVES     CN II   Visual fields full to confrontation.     CN III, IV, VI   Right pupil: Shape: regular.   Left pupil: Shape: regular.   Nystagmus: none   Ophthalmoparesis: none    CN V   Facial sensation intact.     CN VII   Facial expression full, symmetric.     CN VIII   Hearing: intact    CN IX, X   Palate: symmetric    CN XII   CN XII normal.     MOTOR EXAM   Muscle bulk: decreased  Overall muscle tone: normal  Right arm pronator drift: absent  Left arm pronator drift: absent    Strength   Right deltoid: 4/5  Left deltoid: 4/5  Right biceps: 5/5  Left biceps: 5/5  Right triceps: 4/5  Left triceps: 4/5  Right wrist extension: 4/5  Left wrist extension: 4/5  Right interossei: 4/5  Left interossei: 4/5  Right iliopsoas: 4/5  Left iliopsoas: 4/5  Right anterior tibial: 5/5  Left anterior tibial: 5/5  Right posterior tibial: 5/5  Left posterior tibial: 5/5    REFLEXES     Reflexes   Right brachioradialis: 3+  Left brachioradialis: 3+  Right biceps: 3+  Left biceps: 3+  Right patellar: 3+  Left patellar: 3+       Brisk throughout with cross adduction      SENSORY EXAM "   Light touch normal.     GAIT AND COORDINATION      Coordination   Finger to nose coordination: normal       Slow, cautious gait with walker  Postural tremor with FTN     Significant Labs:   Hemoglobin A1c: No results for input(s): HGBA1C in the last 720 hours.  Blood Culture:   Recent Labs  Lab 03/06/18  1631 03/06/18  1714   LABBLOO No Growth to date No Growth to date     CBC:   Recent Labs  Lab 03/06/18  0500 03/07/18  0500   WBC 8.71 8.69   HGB 8.1* 8.0*   HCT 24.6* 24.3*    166     CMP:   Recent Labs  Lab 03/06/18  0500 03/07/18  0500   GLU 86 82    137   K 3.9 4.0    102   CO2 24 21*   BUN 43* 62*   CREATININE 3.5* 4.1*   CALCIUM 9.1 9.8   MG 1.9 2.1   PROT 5.4* 5.5*   ALBUMIN 1.8* 1.8*   BILITOT 1.8* 2.1*   ALKPHOS 142* 153*   AST 42* 50*   ALT 6* 6*   ANIONGAP 10 14   EGFRNONAA 22.4* 18.5*     Inflammatory Markers: No results for input(s): SEDRATE, CRP, PROCAL in the last 48 hours.  Prealbumin: No results for input(s): PREALBUMIN in the last 48 hours.  Respiratory Culture: No results for input(s): GSRESP, RESPIRATORYC in the last 48 hours.  Urine Culture: No results for input(s): LABURIN in the last 48 hours.  Urine Studies: No results for input(s): COLORU, APPEARANCEUA, PHUR, SPECGRAV, PROTEINUA, GLUCUA, KETONESU, BILIRUBINUA, OCCULTUA, NITRITE, UROBILINOGEN, LEUKOCYTESUR, RBCUA, WBCUA, BACTERIA, SQUAMEPITHEL, HYALINECASTS in the last 48 hours.    Invalid input(s): WRIGHTSUR  All pertinent lab results from the past 24 hours have been reviewed.    Significant Imaging: I have reviewed and interpreted all pertinent imaging results/findings within the past 24 hours.     CT Head WO contrast (3/1/18):  No acute intracranial pathology    MRI Brain WO contrast (3/6/18):  The ventricles are normal in size for age, without evidence of hydrocephalus. The previously identified T2/FLAIR signal hyperintensity in the missy along with associated diffusion restriction has resolved. Stable single focus of  T2/FLAIR signal hyperintensity in the right centrum semiovale, nonspecific.   No parenchymal mass, hemorrhage, edema or infarction.No extra-axial blood or fluid collections. The T2 skull base flow voids are preserved. Bone marrow signal intensity is unremarkable.The visualized paranasal sinuses and bilateral mastoid air cells are clear.    Assessment and Plan:     Encephalopathy    Slurred and mumbled speech reported since 3/1 with increased lethargy, confusion, arm movements while sleeping and gait difficulty.   Complicated hospital course with ATN requiring dialysis, superficial wound infection with wound vac, multiple admissions for fevers, peritonitis, persistent ascites, biliary strictures requiring ERCP and biliary stenting, pleural effusion s/p thoracentesis and malnutrition on TPN.    On exam, patient has decreased attention and concentration. Low and mumbled speech, but able to repeat phrases without difficulty or apparent receptive aphasia. Suspect mental status change on 3/1 is due to recent infection with Abx and possible s/e from prograf complicated by ICU delirium after being admitted for 55 days. CT head and MRI brain without contrast were unremarkable for acute intracranial pathology.     ->continue delirium precautions with good sleep hygiene and regulation of sleep/wake cycle  ->encouraged to limit sedating medications and narcotics as able      Seizure    Hx of seizure 11/2/17 described as generalized convulsions while on prograf. Started on Keppra 500mg BID. AMS with speech change, confusion and increased lethargy since 3/1 after prograf was restarted 2/24/18.     -most recent levetiracetam level was on 2/26/18 (29.5) low/normal  -continue Keppra 500 mg BID and routine EEG pending       At risk for opportunistic infections    Immunosuppressed s/p liver transplant 10/2017   -ID following        Long-term use of immunosuppressant medication    Low threshold for LP to r/o CNS infection if mental  status deteriorates   -ID following, appreciate assitance      Acute renal failure with tubular necrosis    Nephrology following  dialysis for metabolic clearance and volume management      VTE Risk Mitigation         Ordered     heparin (porcine) injection 1,000 Units  As needed (PRN)     Route:  Intra-Catheter        02/05/18 0945     heparin (porcine) injection 5,000 Units  Every 8 hours     Route:  Subcutaneous        01/31/18 0957     Medium Risk of VTE  Once      01/11/18 0351     Place sequential compression device  Until discontinued      01/11/18 0351        Thank you for your consult. I will follow-up with patient. Please contact us if you have any additional questions.    Irena Flowers PA-C  General Neurology Consult  Neuro Consult Submittable # [id number]

## 2018-03-07 NOTE — ASSESSMENT & PLAN NOTE
- Paracentesis performed 1/11/18 with 1600 ml removed.    - fluid negative for infection.   - Repeat para 1/19 negative for infection per cell count.  - no fluid seen for paracentesis on 1/23.  - Para 3/6 (diagnostic) negative for infection; wbc 28 segs 3%.

## 2018-03-07 NOTE — PLAN OF CARE
"Problem: Spiritual Distress, Risk/Actual (Adult,Obstetrics,Pediatric)  Intervention: Facilitate Personal Exploration/Expression of Spirituality  F - Yina and Belief: Pt's mom of no Zoroastrianism, and open to and thankful for prayer support. "I'll never turn away prayer," she said. Pt was non-verbal. Per Mom pt is Atheist.     I - Importance: Prayer significant for pt's mom.    C - Community: Mom mentioned her sister-in-law is a big support.     A - Address in Care: Introduced and offered pastoral support with reflective listening and prayer. Mom made aware of 's presence as needed. Will continue to follow.           "

## 2018-03-07 NOTE — PROGRESS NOTES
[facility]  Nephrology  Progress Note    Patient Name: Jhonny Diana  MRN: [id number]  Admission Date: 1/11/2018  Hospital Length of Stay: 55 days  Attending Provider: Nathanael Medina MD   Primary Care Physician: Primary Doctor No  Principal Problem:Fever    Subjective:     HPI: Jhonny Diana is a 26 y/o  male with PMHx relevant for ETOH cirrhosis.  S/p DDLT 10/19/2017; c/b seizures (swtiched off prograf to cyclo), ATN dialysis dependant in iHD MWF, last HD on 1/10, anuric, superficial wound infection s/p wound vac to chevron incision, and multiple admissions for fevers finally dx with peritonitis in November (WBC 5000, 75% PNM) neg for bile leak. HE gets recurrent paracentesis and has peritoneal drain. Other pertinent PMH current wound vac in place 2/2 wound infection, malnutrition requiring TPN for short course and ongoing hypoalbuminemia, and seizure activity while on prograf and has since been switched to cyclosporine without reoccurrence.  He is admitted to LTx service secondary to fever, abdominal pain, and N/V. He reports fever (103) for 1 day prior. Overnight, he developed N/V, reports small amount of green emesis with new left sided pain. He also endorses worsening SOB with exertion. He was scheduled as an outpatient for follow up paracentesis and IR drainage of fluid collection. CXR in ER shows large pleural effusion with subsegmental atelectasis. His ANC is 900. Broad spectrum antibiotics initiated in ED. Nephrology consulted for LAURA dialysis dependant co management. He dialyses at [facility] ED under the care of Dr. Peterson via IMAN rowe.      Interval History:   Patient evaluated at bedside in the NINO, no significant event overnight, has been complaining of some abdominal discomfort, no respiratory distress, hemodynamically stable.     Review of patient's allergies indicates:   Allergen Reactions    Bactrim [sulfamethoxazole-trimethoprim] Other (See Comments)     Mookie Cai  Syndrome     Current Facility-Administered Medications   Medication Frequency    0.9%  NaCl infusion (for blood administration) Q24H PRN    0.9%  NaCl infusion PRN    0.9%  NaCl infusion Once    acetaminophen tablet 650 mg Q6H PRN    albuterol-ipratropium 2.5mg-0.5mg/3mL nebulizer solution 3 mL Q4H PRN    bisacodyl EC tablet 10 mg Daily    bisacodyl suppository 10 mg Daily PRN    cycloSPORINE modified capsule 150 mg BID    dextrose 50% injection 12.5 g PRN    dextrose 50% injection 25 g PRN    docusate sodium capsule 100 mg BID    dronabinol capsule 2.5 mg Daily    epoetin maurisio injection 10,000 Units Every Mon, Wed, Fri    ertapenem (INVANZ) 0.5 g in sodium chloride 0.9% 100 mL IVPB Q24H    glucagon (human recombinant) injection 1 mg PRN    glucose chewable tablet 16 g PRN    glucose chewable tablet 24 g PRN    heparin (porcine) injection 1,000 Units PRN    heparin (porcine) injection 5,000 Units Q8H    isavuconazonium sulfate 372 mg in sodium chloride 0.9% 250 mL Q24H    levetiracetam oral soln Soln 500 mg BID    levothyroxine tablet 75 mcg Before breakfast    metoclopramide HCl 5 mg/5 mL solution 10 mg QID (AC & HS)    multivitamin tablet 1 tablet Daily    naloxone 0.4 mg/mL injection 0.2 mg PRN    omeprazole capsule 40 mg QAM    ondansetron disintegrating tablet 8 mg Q6H PRN    ondansetron injection 4 mg Q6H PRN    oxyCODONE immediate release tablet 10 mg Q6H PRN    polyethylene glycol packet 17 g BID    prochlorperazine injection Soln 10 mg Q6H PRN    psyllium husk (aspartame) 3.4 gram PwPk 1 packet BID    simethicone chewable tablet 80 mg TID PRN    sodium chloride 0.9% flush 3 mL PRN    TPN ADULT CENTRAL LINE CUSTOM Continuous    ursodiol oral suspension (conc: 60 mg/mL) 300 mg BID       Objective:     Vital Signs (Most Recent):  Temp: 98.5 °F (36.9 °C) (03/07/18 0750)  Pulse: 73 (03/07/18 0900)  Resp: 18 (03/07/18 0900)  BP: 129/85 (03/07/18 0900)  SpO2: 98 % (03/07/18  0750)  O2 Device (Oxygen Therapy): room air (03/07/18 0750) Vital Signs (24h Range):  Temp:  [98.3 °F (36.8 °C)-99.4 °F (37.4 °C)] 98.5 °F (36.9 °C)  Pulse:  [73-90] 73  Resp:  [14-20] 18  SpO2:  [95 %-99 %] 98 %  BP: (122-152)/(70-86) 129/85     Weight: 71.2 kg (156 lb 15.5 oz) (03/07/18 0538)  Body mass index is 25.34 kg/m².  Body surface area is 1.82 meters squared.    I/O last 3 completed shifts:  In: 3613.1 [P.O.:410; I.V.:1100; IV Piggyback:200]  Out: 100 [Other:100]    Physical Exam   Constitutional: He is oriented to person, place, and time.   HENT:   Mouth/Throat: No oropharyngeal exudate.   Eyes: No scleral icterus.   Cardiovascular: Normal rate and regular rhythm.    Pulmonary/Chest: Effort normal and breath sounds normal. No respiratory distress. He has no wheezes. He has no rales.   Abdominal: Soft. There is tenderness. There is no rebound.   Musculoskeletal: He exhibits edema.   Lymphadenopathy:     He has no cervical adenopathy.   Neurological: He is alert and oriented to person, place, and time.   Skin: He is not diaphoretic.       Significant Labs:  ABGs: No results for input(s): PH, PCO2, HCO3, POCSATURATED, BE in the last 168 hours.  BMP:   Recent Labs  Lab 03/07/18  0500   GLU 82      CO2 21*   BUN 62*   CREATININE 4.1*   CALCIUM 9.8   MG 2.1     CBC:   Recent Labs  Lab 03/07/18  0500   WBC 8.69   RBC 2.58*   HGB 8.0*   HCT 24.3*      MCV 94   MCH 31.0   MCHC 32.9     CMP:   Recent Labs  Lab 03/07/18  0500   GLU 82   CALCIUM 9.8   ALBUMIN 1.8*   PROT 5.5*      K 4.0   CO2 21*      BUN 62*   CREATININE 4.1*   ALKPHOS 153*   ALT 6*   AST 50*   BILITOT 2.1*     All labs within the past 24 hours have been reviewed.       Assessment/Plan:     Acute renal failure with tubular necrosis    LAURA dialysisi dependant since 1016/2017  HD on TTS [facility] under the cre of Dr. Peterson  On HD for: 2.5 mo since 10/16/2017  Duration of outpatient dialysis session - 3.5 hrs  EDW -  TBD  Residual Renal Function - yes     Plan:  - Will provide dialysis for metabolic clearance and volume management in am via RIJ-PermCath, duration 3.5 hrs, UF 2-3  L as tolerated by patient, maintain MAP>65, will adjust bath to chem.   - Blood pressures stable  - Mineral bone disease: with a Ca of 9.8, corrected with albumin 11.6, will order ionized Ca, vit D and PTH for further evaluation  - Patient went for paracentesis yesterday, but only had small amount removed             Win Sandoval  Nephrology  Fellow  [facility]    Pager [phone]  I have reviewed and concur with the fellow's history, physical, assessment, and plan. I have personally interviewed and examined the patient at bedside.

## 2018-03-07 NOTE — ASSESSMENT & PLAN NOTE
- Poor PO intake since transplant requiring short course of TPN during previous hospital stay.   - albumin remains decreased but appetite slowly improving per pt.   - Dietary consulted. Will need to closely monitor PO intake.   - supplements also ordered.   - SHANE with tube feeding start 1/16. Stopped 1/17/18, was not tolerating. Shane removed 1/20.  - prealbumin 7 on 1/15.  - TPN started 1/16/18.   - Pt able to eat minimally 1/21 and 1/22 which is an improvement.  - GI consulted - EGD reviewed, increased ppi bid, miralax bid, and metamucil bid, decreased narcotics.  - continue reglan, placed SHANE tube with tube feeds - transitioned to nightly feeds. Not tolerating well due to nausea.   - small bowel follow through with normal findings.   - consulted gi for possible gj tube placement, holding off on this for now as with ongoing fevers.  - Dietary changed tube feeds back to 24 hours from nightly 2/19 as not eating enough during the day to meet caloric needs. TF on hold since 2/20.  - retrial marinol 2/22 as pt reports that's the only thing helping his appetite, in the evening 2/2 concern for possible drowsiness  - Pt then developed persistent N/V again and TPN started 2/24. Cont TPN for now.   - Pt able to eat 1/4 sandwich without vomiting 2/25.  Not been able to eat anything more than that in 48 hours per pt and mother.    - EGD 3/6 with normal esophagus    - congested, erythematous and nodular mucosa in the stomach.    - biliary stent in the duodenum    - one duodenal ulcer with a clean ulcer base (Emir Class III).    - biopsies taken in the duodenal bulb and second and third portion of the duodenum.

## 2018-03-07 NOTE — ASSESSMENT & PLAN NOTE
- Post op course complicated by fevers and hyperbilirubinemia.  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  - Liver US 1/8 showed 3.9 cm complex fluid collection anterior to right lobe and moderate nonspecific complex ascites inferior to transplant.   - IR placed drain 1/11/18, cell count negative for infection.  - PBS consulted. ERCP 1/17 with stone and sludge. Pt placed on Actigall - treated with 10 day course of abx.   - Bili elevated - liver u/s 1/28.  - Liver biopsy 1/23 without rejection.  - consulted PBS for ERCP - Choledocholithiasis was found along with sludge, stents placed 1/30.   - ERCP 2/19, overall unremarkable findings. Did place new larger stents. Will need repeat ERCP in 10 weeks. Bilirubin improving.  - Liver US reviewed w mod ascites.  Stent in CBD.    - Paracentesis 3/6 negative for infection, wbc 28 segs 3%.

## 2018-03-07 NOTE — PLAN OF CARE
Problem: Patient Care Overview  Goal: Plan of Care Review  Outcome: Ongoing (interventions implemented as appropriate)  Patient can answer orientation questions but inappropriate responses at times.  Patients speech is slurred.   Received dialysis today.  Complaints of abd pain , prn po pain medication administered.  No nausea noted, zero food intake today.   Patient is currently on TPN and lipids at night. Vitals stable.  Patients mother is at the bedside attentive to patient very involved with patients care.

## 2018-03-07 NOTE — ASSESSMENT & PLAN NOTE
- Patient with stuttering speech, trouble recalling specific words when speaking. Does have hx of seizure (on keppra) previously while on prograf. Switched to prograf over the weekend from cyclosporine. Neuro exam unremarkable.   - CT head without contrast obtained, unremarkable.   - In light of these symptoms, prograf d/c, restart cyclosporine 3/1.  - remains w/o seizure. Cont to have stutter/garbled speech/difficulty finding words.  Worse post EGD as sedation was given.  - MRI 3/6 unremarkable.  - monitor closely. VSS.

## 2018-03-07 NOTE — ASSESSMENT & PLAN NOTE
- h/o constipation, reports having regular BM's with bowel regimen.  - vomiting episode x 1 at home. Anti-emetics ordered PRN.  - KUB repeated 1/14/18 given increased abdominal pain- mild, generalized bowel distention suggesting ileus.  Diet changed to clears as tolerated for bowel rest .  - SHANE tube placed 1/16 for tube feeds. Not tolerating tube feeds, stopped 1/17/18.  - D/C shane 1/19, now in place as of 1/25.  - chronic constipation, Cont bowel regimen.   - See protein calorie malnutrition.  - TF have been on hold since 2/20 for N/V. shane d/c'd 2/23 as not tolerating TF  - 2/24 changes: Increased Reglan to 10 mg, D/C atovaquone, transitioned cyclo to Prograf, change twyla to BID, check Keppra level  - GI consulted. EGD 3/6 as with continued nausea/vomiting with po intake. Appreciate GI recs.    - Cont to have nausea and vomiting w medications and eating.  Cont TPN.

## 2018-03-07 NOTE — SUBJECTIVE & OBJECTIVE
Interval History:   Yesterday, underwent EGD - noted to have congestion/erythematous/nodular gastric mucosa, one ulcer in duodenum  Paracentesis - minimal fluid so diagnostic performed - no evidence of peritonitis    Patient complaining of diffuse abdominal pain and back pain.   Continues to have nausea  One BM yesterday    Per mother, continues to have slurred speech, intermittent twitching    Review of Systems   Constitutional: Negative for chills, diaphoresis and fever.   HENT: Negative for rhinorrhea and sore throat.    Respiratory: Negative for cough and shortness of breath.    Cardiovascular: Negative for chest pain and leg swelling.   Gastrointestinal: Positive for abdominal pain and nausea. Negative for diarrhea and vomiting.   Genitourinary: Negative for dysuria and hematuria.   Musculoskeletal: Negative for arthralgias and myalgias.   Skin: Negative for rash.   Neurological: Negative for headaches.     Objective:     Vital Signs (Most Recent):  Temp: 98.5 °F (36.9 °C) (03/07/18 0750)  Pulse: 81 (03/07/18 1015)  Resp: 18 (03/07/18 1015)  BP: 137/84 (03/07/18 1015)  SpO2: 98 % (03/07/18 0750) Vital Signs (24h Range):  Temp:  [98.3 °F (36.8 °C)-99.4 °F (37.4 °C)] 98.5 °F (36.9 °C)  Pulse:  [73-90] 81  Resp:  [14-20] 18  SpO2:  [95 %-99 %] 98 %  BP: (122-152)/(70-86) 137/84     Weight: 71.2 kg (156 lb 15.5 oz)  Body mass index is 25.34 kg/m².    Estimated Creatinine Clearance: 24.2 mL/min (A) (based on SCr of 4.1 mg/dL (H)).    Physical Exam   Constitutional: He is oriented to person, place, and time. He appears well-developed and well-nourished. He appears distressed.   HENT:   Head: Normocephalic and atraumatic.   Eyes: Conjunctivae and EOM are normal.   Neck: Normal range of motion. Neck supple.   Cardiovascular: Normal rate.    Pulmonary/Chest: Effort normal. No respiratory distress.   Abdominal: Soft. He exhibits no distension. There is tenderness. There is no guarding.   Musculoskeletal: Normal range of  motion. He exhibits no edema.   Neurological: He is alert and oriented to person, place, and time.   Skin: Skin is warm and dry. No rash noted. He is not diaphoretic. No erythema.   Psychiatric: He has a normal mood and affect. His behavior is normal.       Significant Labs: All pertinent labs within the past 24 hours have been reviewed.    Significant Imaging: I have reviewed all pertinent imaging results/findings within the past 24 hours.

## 2018-03-07 NOTE — PLAN OF CARE
Problem: Patient Care Overview  Goal: Plan of Care Review  Outcome: Ongoing (interventions implemented as appropriate)  Pt has call bell in reach, non slip socks on, and bedrails up x2. Pt has mom at bedside. Pt has nausea treated with prn meds. Pt on HD throughout the week. Pt on TPN and lipids. Pt has prn pain meds. Pt working with PT/OT during the day.

## 2018-03-07 NOTE — ASSESSMENT & PLAN NOTE
27-year-old male  - EtOH cirrhosis s/p DDLT 10/19/2017 CMV D+/R+  - Seizures on tacro, on cyclosporine and keppra  - ATN on HD MWF  - Multiple episodes of peritonitis s/p ex-laporoscopy 2/7/2018  - Persistent ascites / intraabdominal fluid collection  - Biliary strictures requiring multiple ERCP with biliary stenting   - Pleural effusion s/p thoracentesis  - Malnutrition on TPN  - Erythematous, congested, nodular gastric mucosa 3/6/18 - biopsy pending  - Duodenal ulcer 3/6/2018 - biopsy pending  - Immunosuppression on prophylaxis - pentamidine, isavuconazole    Additional infectious evaluation has been negative (adeno 3/1, aspergillus 2/1, CMV 3/1, EBV 2/28, Indium scan 2/28)    Recommendation:  - Patient has completed 13 days of ertapenem - consider discontinuing given neurological changes including slurred speech and muscle jerking  - Follow-up gastric / duodenal biopsy - send pathology for CMV, bacterial, AFB, fungal stains

## 2018-03-07 NOTE — PROGRESS NOTES
[facility]  Infectious Disease  Progress Note    Patient Name: Jhonny Diana  MRN: [id number]  Admission Date: 1/11/2018  Length of Stay: 55 days  Attending Physician: Nathanael Medina MD  Primary Care Provider: Primary Doctor No    Isolation Status: No active isolations  Assessment/Plan:      Fever    27-year-old male  - EtOH cirrhosis s/p DDLT 10/19/2017 CMV D+/R+  - Seizures on tacro, on cyclosporine and keppra  - ATN on HD MWF  - Multiple episodes of peritonitis s/p ex-laporoscopy 2/7/2018  - Persistent ascites / intraabdominal fluid collection  - Biliary strictures requiring multiple ERCP with biliary stenting   - Pleural effusion s/p thoracentesis  - Malnutrition on TPN  - Erythematous, congested, nodular gastric mucosa 3/6/18 - biopsy pending  - Duodenal ulcer 3/6/2018 - biopsy pending  - Immunosuppression on prophylaxis - pentamidine, isavuconazole    Additional infectious evaluation has been negative (adeno 3/1, aspergillus 2/1, CMV 3/1, EBV 2/28, Indium scan 2/28)    Recommendation:  - Patient has completed 13 days of ertapenem - consider discontinuing given neurological changes including slurred speech and muscle jerking  - Follow-up gastric / duodenal biopsy - send pathology for CMV, bacterial, AFB, fungal stains              Anticipated Disposition: resolution abdominal pain    Thank you for your consult. I will follow-up with patient. Please contact us if you have any additional questions.    Marcela Longoria MD  Infectious Disease  [facility]    Subjective:     Principal Problem:Fever    HPI: No notes on file  Interval History:   Yesterday, underwent EGD - noted to have congestion/erythematous/nodular gastric mucosa, one ulcer in duodenum  Paracentesis - minimal fluid so diagnostic performed - no evidence of peritonitis    Patient complaining of diffuse abdominal pain and back pain.   Continues to have nausea  One BM yesterday    Per mother, continues to have slurred  speech, intermittent twitching    Review of Systems   Constitutional: Negative for chills, diaphoresis and fever.   HENT: Negative for rhinorrhea and sore throat.    Respiratory: Negative for cough and shortness of breath.    Cardiovascular: Negative for chest pain and leg swelling.   Gastrointestinal: Positive for abdominal pain and nausea. Negative for diarrhea and vomiting.   Genitourinary: Negative for dysuria and hematuria.   Musculoskeletal: Negative for arthralgias and myalgias.   Skin: Negative for rash.   Neurological: Negative for headaches.     Objective:     Vital Signs (Most Recent):  Temp: 98.5 °F (36.9 °C) (03/07/18 0750)  Pulse: 81 (03/07/18 1015)  Resp: 18 (03/07/18 1015)  BP: 137/84 (03/07/18 1015)  SpO2: 98 % (03/07/18 0750) Vital Signs (24h Range):  Temp:  [98.3 °F (36.8 °C)-99.4 °F (37.4 °C)] 98.5 °F (36.9 °C)  Pulse:  [73-90] 81  Resp:  [14-20] 18  SpO2:  [95 %-99 %] 98 %  BP: (122-152)/(70-86) 137/84     Weight: 71.2 kg (156 lb 15.5 oz)  Body mass index is 25.34 kg/m².    Estimated Creatinine Clearance: 24.2 mL/min (A) (based on SCr of 4.1 mg/dL (H)).    Physical Exam   Constitutional: He is oriented to person, place, and time. He appears well-developed and well-nourished. He appears distressed.   HENT:   Head: Normocephalic and atraumatic.   Eyes: Conjunctivae and EOM are normal.   Neck: Normal range of motion. Neck supple.   Cardiovascular: Normal rate.    Pulmonary/Chest: Effort normal. No respiratory distress.   Abdominal: Soft. He exhibits no distension. There is tenderness. There is no guarding.   Musculoskeletal: Normal range of motion. He exhibits no edema.   Neurological: He is alert and oriented to person, place, and time.   Skin: Skin is warm and dry. No rash noted. He is not diaphoretic. No erythema.   Psychiatric: He has a normal mood and affect. His behavior is normal.       Significant Labs: All pertinent labs within the past 24 hours have been reviewed.    Significant Imaging:  I have reviewed all pertinent imaging results/findings within the past 24 hours.

## 2018-03-07 NOTE — ASSESSMENT & PLAN NOTE
- Maintenance IS with cyclosporine --> transitioned to Prograf 2/24 for continued nausea. However now with neurological symptoms with prograf and hx of seizure, prograf d/c and cyclosporine restarted 3/1.  - continues to have neurologic symptoms. Supra therapeutic cyclosporine level. Will dc cyclo for now.  - MMF on hold for infections and neutropenia.   - Continue to check cyclosporine and Prograf level daily during transition. Assess for toxicity and adjust level as needed.

## 2018-03-07 NOTE — ASSESSMENT & PLAN NOTE
Low threshold for LP to r/o CNS infection if mental status deteriorates   -ID following, appreciate assitance

## 2018-03-07 NOTE — PROGRESS NOTES
Dialysis completed. Right IJ tunneled catheter flushed with normal saline,heparinized, capped and taped. Patient dialyzed for 3.5 hours with fluid removal of 3 liters. Tolerated well with stable vital signs.

## 2018-03-07 NOTE — HPI
"27 y.o. Male with hx of alcoholic cirrhosis s/p liver transplant (10/19/17) complicated by seizures (prograf switched to cyclo) currently on Keppra 500 mg BID, ATN on HD (MWF), superficial wound infection s/p wound vac and multiple admissions for fevers, multiple episodes of peritonitis s/p ex lap (2/7/18), persistent ascites, biliary strictures requiring ERCP and biliary stenting, pleural effusion s/p thoracentesis, malnutrition on TPN, nodular gastric mucosa s/p gastric and duodenal bx (3/6/18) and liver bx (1/23/18) without evidence of rejection.     Neurology consulted 3/7/18 for worsening confusion, lethargy and garbled/slurred speech. Per chart review, cyclosporine was transitioned to prograf on 2/24/18 for nausea. On 3/1, patient developed "stuttering speech and word recall issues." CT Head without contrast was unremarkable and prograf was discontinued. Cyclosporine was restarted on 3/1. On 3/4 blood and urine cultures were repeated given continued malaise and stuttering. On 3/6, EGD was performed and patient received sedation and had increased lethargy and dysarthria afterwards. MRI brain without contrast was performed showing no acute intracranial pathology and resolution of previously seen signal abnormality in the central missy.     Patient's mom reports speech change occurred at time prograf was restarted, but also with initiation of Abx. Speech has been described as mumbled and low. He has substituted words that sound similar and had word finding difficulty. Mom feels he has moments where he zones out and talks during his sleep. He has also been doing arm movements like he is manipulating something while he is sleeping. His gait has worsened recently where is has trouble getting from his bed to the door. Previously he was able to walk unassisted to s downstairs. He has been walking on his tip toes and complaining of left achilles pain. Per ID note, they recommended d/c ertapenem given new speech " changes and intermittent twitching.

## 2018-03-07 NOTE — SUBJECTIVE & OBJECTIVE
Scheduled Meds:   bisacodyl  10 mg Oral Daily    docusate sodium  100 mg Oral BID    dronabinol  2.5 mg Oral Daily    epoetin maurisio (PROCRIT) injection  10,000 Units Intravenous Every Mon, Wed, Fri    ertapenem (INVANZ) IVPB  500 mg Intravenous Q24H    fat emulsion 20%  250 mL Intravenous Once    heparin (porcine)  5,000 Units Subcutaneous Q8H    custom IVPB builder   Intravenous Q24H    levetiracetam oral soln  500 mg Oral BID    levothyroxine  75 mcg Oral Before breakfast    metoclopramide HCl  10 mg Oral QID (AC & HS)    multivitamin  1 tablet Oral Daily    omeprazole  40 mg Oral QAM    polyethylene glycol  17 g Oral BID    psyllium husk (aspartame)  3.4 g Oral BID    ursodiol  300 mg Oral BID     Continuous Infusions:   TPN ADULT CENTRAL LINE CUSTOM 48 mL/hr at 03/06/18 2116    TPN ADULT CENTRAL LINE CUSTOM       PRN Meds:sodium chloride, sodium chloride 0.9%, acetaminophen, albuterol-ipratropium 2.5mg-0.5mg/3mL, bisacodyl, dextrose 50%, dextrose 50%, glucagon (human recombinant), glucose, glucose, heparin (porcine), naloxone, ondansetron, ondansetron, oxyCODONE, prochlorperazine, simethicone, sodium chloride 0.9%    Review of Systems   Constitutional: Positive for activity change, appetite change, fatigue and fever. Negative for chills.   HENT: Negative.  Negative for congestion and facial swelling.    Eyes: Negative for pain, discharge and visual disturbance.   Respiratory: Negative for cough, chest tightness, shortness of breath and wheezing.    Cardiovascular: Negative for chest pain, palpitations and leg swelling.   Gastrointestinal: Positive for abdominal distention, abdominal pain and nausea. Negative for constipation.   Endocrine: Negative.    Genitourinary: Positive for decreased urine volume. Negative for difficulty urinating.        Anuric   Musculoskeletal: Positive for back pain. Negative for arthralgias and myalgias.   Skin: Positive for color change and wound. Negative for rash.    Allergic/Immunologic: Positive for immunocompromised state.   Neurological: Positive for speech difficulty and weakness. Negative for dizziness, seizures and headaches.   Psychiatric/Behavioral: Positive for decreased concentration and dysphoric mood. Negative for confusion. The patient is not nervous/anxious.    All other systems reviewed and are negative.    Objective:     Vital Signs (Most Recent):  Temp: 98.8 °F (37.1 °C) (03/07/18 1145)  Pulse: 88 (03/07/18 1145)  Resp: 16 (03/07/18 1145)  BP: 129/73 (03/07/18 1145)  SpO2: 99 % (03/07/18 1145) Vital Signs (24h Range):  Temp:  [98.5 °F (36.9 °C)-99.2 °F (37.3 °C)] 98.8 °F (37.1 °C)  Pulse:  [73-90] 88  Resp:  [14-20] 16  SpO2:  [95 %-99 %] 99 %  BP: (122-152)/(67-85) 129/73     Weight: 71.2 kg (156 lb 15.5 oz)  Body mass index is 25.34 kg/m².    Intake/Output - Last 3 Shifts       03/05 0700 - 03/06 0659 03/06 0700 - 03/07 0659 03/07 0700 - 03/08 0659    P.O. 230 360 120    I.V. (mL/kg)  1100 (15.4)     Blood 308      Other 958      IV Piggyback 100 100     TPN 1059.7 1433.8 336    Total Intake(mL/kg) 2655.7 (45.2) 2993.8 (42) 456 (6.4)    Urine (mL/kg/hr) 0 (0) 0 (0) 0 (0)    Emesis/NG output   0 (0)    Other 3476 (2.5) 100 (0.1) 0 (0)    Stool 0 (0) 0 (0) 0 (0)    Blood   0 (0)    Total Output 3476 100 0    Net -820.3 +2893.8 +456           Urine Occurrence 0 x  0 x    Stool Occurrence 1 x 1 x 0 x    Emesis Occurrence   0 x          Physical Exam   Constitutional: He is oriented to person, place, and time. He appears well-developed. No distress.   Temporal and distal extremity muscle wasting   HENT:   Head: Normocephalic and atraumatic.   Mouth/Throat: No oropharyngeal exudate.   Eyes: EOM are normal. Pupils are equal, round, and reactive to light. Scleral icterus is present.   Neck: Normal range of motion. Neck supple. No JVD present. No thyromegaly present.   Cardiovascular: Normal rate, regular rhythm, normal heart sounds and intact distal pulses.    No  murmur heard.  Pulmonary/Chest: Effort normal. No respiratory distress. He has decreased breath sounds in the right middle field, the right lower field and the left lower field. He has no wheezes. He exhibits no tenderness.   Diminished to RLL   Abdominal: Soft. Bowel sounds are normal. He exhibits distension and ascites. There is tenderness. There is no rebound and no guarding.   Dressing to chevron.  Wd vac removed 1/12/18  Dependent edema present R flank; surrounding erythema   Musculoskeletal: Normal range of motion. He exhibits edema (2+ LE edema). He exhibits no tenderness.   Neurological: He is alert and oriented to person, place, and time. He has normal reflexes.   Skin: Skin is warm and dry. Capillary refill takes 2 to 3 seconds. He is not diaphoretic. No erythema.   jaundice   Psychiatric: He has a normal mood and affect. His behavior is normal. Judgment and thought content normal. His mood appears not anxious.   Depressed mood   Nursing note and vitals reviewed.      Laboratory:  Immunosuppressants     None        CBC:   Recent Labs  Lab 03/07/18  0500   WBC 8.69   RBC 2.58*   HGB 8.0*   HCT 24.3*      MCV 94   MCH 31.0   MCHC 32.9     CMP:   Recent Labs  Lab 03/07/18  0500   GLU 82   CALCIUM 9.8   ALBUMIN 1.8*   PROT 5.5*      K 4.0   CO2 21*      BUN 62*   CREATININE 4.1*   ALKPHOS 153*   ALT 6*   AST 50*   BILITOT 2.1*     Labs within the past 24 hours have been reviewed.    Diagnostic Results:  I have personally reviewed all pertinent imaging studies.

## 2018-03-07 NOTE — ASSESSMENT & PLAN NOTE
Hx of seizure 11/2/17 described as generalized convulsions while on prograf. Started on Keppra 500mg BID.     AMS with speech change, confusion and increased lethargy since 3/1 after prograf was restarted 2/24/18.     -most recent levetiracetam level was on 2/26/18 (29.5) low/normal  -continue Keppra 500 mg BID and routine EEG pending

## 2018-03-07 NOTE — ASSESSMENT & PLAN NOTE
LAURA dialysisi dependant since 1016/2017  HD on TTS [facility] under the cre of Dr. Peterson  On HD for: 2.5 mo since 10/16/2017  Duration of outpatient dialysis session - 3.5 hrs  EDW - TBD  Residual Renal Function - yes     Plan:  - Will provide dialysis for metabolic clearance and volume management in am via RIJ-PermCath, duration 3.5 hrs, UF 2-3  L as tolerated by patient, maintain MAP>65, will adjust bath to chem.   - Blood pressures stable  - Mineral bone disease: with a Ca of 9.8, corrected with albumin 11.6, will order ionized Ca, vit D and PTH for further evaluation  - Patient went for paracentesis yesterday, but only had small amount removed

## 2018-03-07 NOTE — PROGRESS NOTES
"Update:  SW met with pt's mother Sanjuanita Diana as pt was away for HD. Pt's mother was AAOx4 and very tearful this morning due to being "sick of this." Pt's mother expressed her grief around observing her pt's recent AMS as well as seeing him in so much pain. SW validated pt's mother but mostly provided a space for her to process freely. SW and pt's mother discussed importance of self-care. Ideas ranged [place], staying out of the hospital while pt is in HD so that she can get a break and change of scenery, inviting her children to come to [place] for a quick dinner outside of the hospital as a way for her to feel connected to her family and feel supported, and reminding herself that she cannot control the situation pt is currently in. Pt's mother appeared more relaxed toward the end of the meeting and reiterated that it is helpful to have a place to vent. Pt's mother expressed appreciation for time spent and had no current needs. Mother reports that pt's LTD is still pending. SW remains available for continued psychosocial support, education, resources, and additional d/c planning as needed.   "

## 2018-03-07 NOTE — PROGRESS NOTES
[facility]  Liver Transplant  Progress Note    Patient Name: Jhonny Diana  MRN: [id number]  Admission Date: 2018  Hospital Length of Stay: 55 days  Code Status: Full Code  Primary Care Provider: Primary Doctor No  Post-Operative Day: 139    ORGAN:   LIVER  Disease Etiology: Acute Alcoholic Hepatitis  Donor Type:    - Brain Death  CDC High Risk:   No  Donor CMV Status:   Donor CMV Status: Positive  Donor HBcAB:   Negative  Donor HCV Status:   Negative  Whole or Partial: Whole Liver  Biliary Anastomosis: End to End  Arterial Anatomy: Standard  Subjective:     History of Present Illness:  Jhonny Diana is a 28 y/o male with past medical history of alcoholic cirrhosis.  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 Found to have peritonitis in November (WBC 5000, 75% PNM) neg for bile leak. He was treated initially with vanc/cefepime. Repeat cell counts  with some improvement (WBC 1400, 45% PNM). He has undergone multiple paracenteses as well as abscess drainage of perihepatic fluid collections and treated with antimicrobial therapy but no positive cultures. Of note, biliary stricture also identified and ERCP performed on 2017 with sphincterotomy and biliary stent placed. Liver tests still have not normalized despite intervention, bilirubin and AP remain elevated. Other pertinent PMH current wound vac in place 2/2 wound infection, malnutrition requiring TPN for short course and ongoing hypoalbuminemia, and seizure activity while on prograf and has since been switched to cyclosporine without reoccurrence.  He presented to the ER for fever, abdominal pain, and N/V. He reports fever (103) for 1  day prior. Overnight, he developed N/V, reports small amount of green emesis with new left sided pain. He also endorses worsening SOB with exertion. He was scheduled as an outpatient for follow up paracentesis and IR drainage of fluid collection. CXR in ER shows large pleural effusion with subsegmental atelectasis. Infectious work up initiated in ER. His ANC is 900. Broad spectrum antibiotics initiated in ED. He denies chest pain, palpitations, diarrhea, constipation, or back pain. Denies any sick contacts.    Hospital Course:  Thoracentesis (1.2L off), Paracentesis (1.6L off), and IR drainage of fluid collection 1/11, all fluids negative for infection. ID consulted. Broad spectrum antibiotics d/c'd 1/15. Chronically malnourished with poor PO intake, prealbumin 7. Shane tube placed 1/15 for tube feedings.     ERCP 1/17 with sludge and a biliary stone which was removed and stent exchanged.  Remained with n/v; therefore, TF remained on hold and TPN continued.   Pt with fever s/p ERCP on 1/17 which continued until 1/19.  Vanc/cefepime restarted.  Blood cx 1/17 and 1/18 NGTD.  ID reconsulted.  Fungal markers sent.  CT C/A/P obtained.  With large R pleural effusion and ascites- both drained 1/19 and negative for infx per cell count. Pt afebrile 1/20. Liver biopsy 1/23 - without rejection.  EGD 1/25 - unremarkable for source of GI symptoms.  Resume diet along with other GI recommendations. SHANE tube placed for tube feeds.       2/22: Pt again spiked temp of 101 overnight, now down to 99. Resent Bcx overnight, prelim NGTD. Transitioned to Erta from vanc/zosyn per ID. Planning for tagged WBC scan on Monday. R sided abdominal erythema/dependent edema improving today.  TF have been on hold since 2/20. Will continue to hold today per pt request. Pt working on diet. KUB yesterday with significant stool and gas. Declines colace, miralax, metamucil, but willing to take daily bisacodyl. Pt agreeable to suppository today. If  suppository not successful can give mag citrate through dylan tonight. Will also retrial Marinol tonight as pt reported improvement in appetite previously. Will monitor closely for sedation.     2/23:  2 small BM 2/22.  Tolerated HD.  McKinnon removed.  Will attempt to eat this PM.      2/24:  Pt ate then vomited hamburger 2/23.  Will resume TPN.  D/C atovaquone, transition cyclo to Prograf, check Keppra level, increase Reglan to 0 mg, change twyla to BID for nausea.  Will give pentam today.    2/25:  TPN going well, pt ate 1/2 subway sandwhich without vomiting yesterday, will continue to monitor.  Plan to repeat EGD this week.  Pt with drop in H/h again and appears to be hemolyzing.  LDH elevated, hapto slightly low, LEE pending.  With neutropenia- Neupogen given.    2/26:  Pt with severe back pain s/p HD. Unclear if secondary to neupogen.  Repeat labs stable. Indium scan scheduled for 2/27- injection done today (back pain started prior). CT A/P obtained with fluid collection seen in pelvis.    2/27: Patient spiking fevers, 101. Indium scan, negative results. CT A/P obtained 2/26 with focal/loculated region of fluid in the lower right abdomen measuring approximately 8.7 x 5.5 cm. IR attempted drainage, however, per IR,there was  limited fluid interposed with bowel in the RLQ with no safe pocket seen for drainage.    2/28: Remains with low grade fevers. Neupogen given.     3/1: Patient with stuttering speech, trouble recalling specific words when speaking. Does have hx of seizure (on keppra) previously while on prograf. Switched to prograf 2/24 from cyclosporine. Neuro exam unremarkable. CT head without contrast obtained, unremarkable. In light of these symptoms, prograf d/c, cyclosporine restarted 3/1. Per transplant attending, held off on EGD at this time as concern anesthesia would put patient at further increase risk for seizure activity.     3/4: pt walked in room yesterday.  Had 1 BM.  No appetite still.  Cont TPN.   He reports swelling and back pain today.  He vomited after taking Oxy PO.  Fentanyl 12.5mg IV ordered.  Patient rested well afterward.  Remains afebrile.   ID following.   Cont Ertapenem.  Repeat blood and urine cx given continued malaise and stutter- blood cx prelim NGTD.  UA ok.  Urine cx pending.    Tentative plan for EGD on Monday to assess source of fevers/nausea/abdominal pain w bx for CMV and GVHD.  Will also reassess rather able to perform paracentesis.     3/5: no acute events overnight. Pt still not feeling well and with poor sleep overnight. Cont to have mumbling of speech at times but slightly better than before. Remains with abdominal pain but relieved with PO pain meds. Tolerated HD well today. Will plan for paracentesis and EGD tomorrow to assess for peritonitis and cause of persistent N/V respectively.     3/6: pt continues to have garbled/mumbled speech. EGD performed today and pt received sedation during procedure. Increased lethargy and dysarthria noted post procedure. VS stable. Paracentesis scheduled today as well to r/o peritonitis. Wbc count stable. Afebrile. Continue Ertapenem. HD yesterday and pt tolerated well, 2.5 L removed. Monitor.     3/7: MRI brain yesterday unremarkable. Blood cultures ngtd so far. Neurology consulted. EGD and Paracentesis yesterday. Para with 25 cc removed, wbc 28 segs 3%. Continue TPN. Monitor.    Scheduled Meds:   bisacodyl  10 mg Oral Daily    docusate sodium  100 mg Oral BID    dronabinol  2.5 mg Oral Daily    epoetin maurisio (PROCRIT) injection  10,000 Units Intravenous Every Mon, Wed, Fri    ertapenem (INVANZ) IVPB  500 mg Intravenous Q24H    fat emulsion 20%  250 mL Intravenous Once    heparin (porcine)  5,000 Units Subcutaneous Q8H    custom IVPB builder   Intravenous Q24H    levetiracetam oral soln  500 mg Oral BID    levothyroxine  75 mcg Oral Before breakfast    metoclopramide HCl  10 mg Oral QID (AC & HS)    multivitamin  1 tablet Oral Daily     omeprazole  40 mg Oral QAM    polyethylene glycol  17 g Oral BID    psyllium husk (aspartame)  3.4 g Oral BID    ursodiol  300 mg Oral BID     Continuous Infusions:   TPN ADULT CENTRAL LINE CUSTOM 48 mL/hr at 03/06/18 2116    TPN ADULT CENTRAL LINE CUSTOM       PRN Meds:sodium chloride, sodium chloride 0.9%, acetaminophen, albuterol-ipratropium 2.5mg-0.5mg/3mL, bisacodyl, dextrose 50%, dextrose 50%, glucagon (human recombinant), glucose, glucose, heparin (porcine), naloxone, ondansetron, ondansetron, oxyCODONE, prochlorperazine, simethicone, sodium chloride 0.9%    Review of Systems   Constitutional: Positive for activity change, appetite change, fatigue and fever. Negative for chills.   HENT: Negative.  Negative for congestion and facial swelling.    Eyes: Negative for pain, discharge and visual disturbance.   Respiratory: Negative for cough, chest tightness, shortness of breath and wheezing.    Cardiovascular: Negative for chest pain, palpitations and leg swelling.   Gastrointestinal: Positive for abdominal distention, abdominal pain and nausea. Negative for constipation.   Endocrine: Negative.    Genitourinary: Positive for decreased urine volume. Negative for difficulty urinating.        Anuric   Musculoskeletal: Positive for back pain. Negative for arthralgias and myalgias.   Skin: Positive for color change and wound. Negative for rash.   Allergic/Immunologic: Positive for immunocompromised state.   Neurological: Positive for speech difficulty and weakness. Negative for dizziness, seizures and headaches.   Psychiatric/Behavioral: Positive for decreased concentration and dysphoric mood. Negative for confusion. The patient is not nervous/anxious.    All other systems reviewed and are negative.    Objective:     Vital Signs (Most Recent):  Temp: 98.8 °F (37.1 °C) (03/07/18 1145)  Pulse: 88 (03/07/18 1145)  Resp: 16 (03/07/18 1145)  BP: 129/73 (03/07/18 1145)  SpO2: 99 % (03/07/18 1145) Vital Signs (24h  Range):  Temp:  [98.5 °F (36.9 °C)-99.2 °F (37.3 °C)] 98.8 °F (37.1 °C)  Pulse:  [73-90] 88  Resp:  [14-20] 16  SpO2:  [95 %-99 %] 99 %  BP: (122-152)/(67-85) 129/73     Weight: 71.2 kg (156 lb 15.5 oz)  Body mass index is 25.34 kg/m².    Intake/Output - Last 3 Shifts       03/05 0700 - 03/06 0659 03/06 0700 - 03/07 0659 03/07 0700 - 03/08 0659    P.O. 230 360 120    I.V. (mL/kg)  1100 (15.4)     Blood 308      Other 958      IV Piggyback 100 100     TPN 1059.7 1433.8 336    Total Intake(mL/kg) 2655.7 (45.2) 2993.8 (42) 456 (6.4)    Urine (mL/kg/hr) 0 (0) 0 (0) 0 (0)    Emesis/NG output   0 (0)    Other 3476 (2.5) 100 (0.1) 0 (0)    Stool 0 (0) 0 (0) 0 (0)    Blood   0 (0)    Total Output 3476 100 0    Net -820.3 +2893.8 +456           Urine Occurrence 0 x  0 x    Stool Occurrence 1 x 1 x 0 x    Emesis Occurrence   0 x          Physical Exam   Constitutional: He is oriented to person, place, and time. He appears well-developed. No distress.   Temporal and distal extremity muscle wasting   HENT:   Head: Normocephalic and atraumatic.   Mouth/Throat: No oropharyngeal exudate.   Eyes: EOM are normal. Pupils are equal, round, and reactive to light. Scleral icterus is present.   Neck: Normal range of motion. Neck supple. No JVD present. No thyromegaly present.   Cardiovascular: Normal rate, regular rhythm, normal heart sounds and intact distal pulses.    No murmur heard.  Pulmonary/Chest: Effort normal. No respiratory distress. He has decreased breath sounds in the right middle field, the right lower field and the left lower field. He has no wheezes. He exhibits no tenderness.   Diminished to RLL   Abdominal: Soft. Bowel sounds are normal. He exhibits distension and ascites. There is tenderness. There is no rebound and no guarding.   Dressing to chevron.  Wd vac removed 1/12/18  Dependent edema present R flank; surrounding erythema   Musculoskeletal: Normal range of motion. He exhibits edema (2+ LE edema). He exhibits no  tenderness.   Neurological: He is alert and oriented to person, place, and time. He has normal reflexes.   Skin: Skin is warm and dry. Capillary refill takes 2 to 3 seconds. He is not diaphoretic. No erythema.   jaundice   Psychiatric: He has a normal mood and affect. His behavior is normal. Judgment and thought content normal. His mood appears not anxious.   Depressed mood   Nursing note and vitals reviewed.      Laboratory:  Immunosuppressants     None        CBC:   Recent Labs  Lab 03/07/18  0500   WBC 8.69   RBC 2.58*   HGB 8.0*   HCT 24.3*      MCV 94   MCH 31.0   MCHC 32.9     CMP:   Recent Labs  Lab 03/07/18  0500   GLU 82   CALCIUM 9.8   ALBUMIN 1.8*   PROT 5.5*      K 4.0   CO2 21*      BUN 62*   CREATININE 4.1*   ALKPHOS 153*   ALT 6*   AST 50*   BILITOT 2.1*     Labs within the past 24 hours have been reviewed.    Diagnostic Results:  I have personally reviewed all pertinent imaging studies.    Assessment/Plan:     Delayed surgical wound healing    - wd vac removed 1/12/18.  Wound healing well. Aquacel AG and Mepilex border dressing applied via wound care recs.           Liver transplanted    - Post op course complicated by fevers and hyperbilirubinemia.  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  - Liver US 1/8 showed 3.9 cm complex fluid collection anterior to right lobe and moderate nonspecific complex ascites inferior to transplant.   - IR placed drain 1/11/18, cell count negative for infection.  - PBS consulted. ERCP 1/17 with stone and sludge. Pt placed on Actigall - treated with 10 day course of abx.   - Bili elevated - liver u/s 1/28.  - Liver biopsy 1/23 without rejection.  - consulted PBS for ERCP - Choledocholithiasis was found along with sludge, stents placed 1/30.   - ERCP 2/19, overall unremarkable findings. Did place new larger stents. Will need repeat ERCP in 10 weeks. Bilirubin improving.  - Liver US reviewed w mod ascites.  Stent in CBD.    -  Paracentesis 3/6 negative for infection, wbc 28 segs 3%.        Long-term use of immunosuppressant medication    - Maintenance IS with cyclosporine --> transitioned to Prograf 2/24 for continued nausea. However now with neurological symptoms with prograf and hx of seizure, prograf d/c and cyclosporine restarted 3/1.  - continues to have neurologic symptoms. Supra therapeutic cyclosporine level. Will dc cyclo for now.  - MMF on hold for infections and neutropenia.   - Continue to check cyclosporine and Prograf level daily during transition. Assess for toxicity and adjust level as needed.        Prophylactic immunotherapy    - See long term use of immunosuppression.         At risk for opportunistic infections    - CMV detected 2/1 now on tx dose valcyte. CMV PCR 2/15 not detected. CMV PCR 2/22, negative.  - Repeat CMV PCR 3/1 negative.    - Pentam given 2/24.            Seizure    - Patient with stuttering speech, trouble recalling specific words when speaking. Does have hx of seizure (on keppra) previously while on prograf. Switched to prograf over the weekend from cyclosporine. Neuro exam unremarkable.   - CT head without contrast obtained, unremarkable.   - In light of these symptoms, prograf d/c, restart cyclosporine 3/1.  - remains w/o seizure. Cont to have stutter/garbled speech/difficulty finding words.  Worse post EGD as sedation was given.  - MRI 3/6 unremarkable.  - monitor closely. VSS.          Anemia of chronic disease    - H&H stable. Cont to monitor with daily cbc.         Other neutropenia    - WBC stable today.   - Neupogen last given 3/1.           Other cytomegaloviral diseases    - detected at 370 on CMV PCR 2/1.  - case discussed with ID and will hold off on treatment at this time given low WBC.   - undetected CMV PCR 2/8.  - continue Valcyte 200 mg every Mon, Wed, Fri.  - CMV PCR 2/15 negative  - Per ID, valcyte dose decreased to ppx dose on 2/18.  - CMV PCR 2/22 negative.  - Repeat scope 3/6 to  assess persistent N/V and possible CMV causing these symptoms. Biopsies pending.        Fever    - Started on vanc/cefepime for ERCP. Now transitioned to vanc/zosyn.  - Blood cultures sent, prelim NGTD. Repeat blood cultures and chest xray today 2/10.  - thoracentesis 2/1 - fluid negative for infection.  - atelectasis noted on ct scan - start breathing tx & CPT.  - now cmv positive - started treatment 2/5/18.  - taken to OR 2/7 for exp lap - fluid collections drained, cultures pending.  - Patient with low grade fever 2/15, 100.3.  - CT A/P 2/15 with no evidence of IA fluid infection. Did show R pleural effusion s/p thoracentesis, cell count negative for infection, cx pending.  - Repeat blood cx 2/15 prelim NGTD, CMV PCR 2/15 negative   Complaining of abdominal pain. Is tender in RUQ.  - Remains with fevers.  - Bilirubin remains elevated. Concerning for cholangitis. Antibiotics broadened 2/17. ID re consulted.   - ERCP  2/19, overall unremarkable findings. Did place new larger stents. Will need repeat ERCP in 10 weeks. Bilirubin is stable post ERCP.  -Tmax 101 2/21 PM again ID following. Plan to transition vanc/zosyn to ertapenem 2/22 as no improvement noted.   - Erta started 2/22.  - CT A/P obtained 2/26 with focal/loculated region of fluid in the lower right abdomen measuring approximately 8.7 x 5.5 cm.   - IR attempted drainage on fluid collection on 2/27, however, per IR,there was limited fluid interposed with bowel in the RLQ with no safe pocket seen for drainage.  -Tagged WBC scan 2/27, negative results.  - Remains with low grade fevers.   - Per transplant attending, will hold off on EGD at this time as concern anesthesia will put patient at further increase risk for seizure activity. Will plan for potenial EGD on Monday. Discussed with GI, appreciate their assistance.  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 EBV PCR negative. Adenovirus pending. Repeat CMV PCR negative.    - Back pain and  abdominal pain persists. Last BM 3/5 with brown bomb.   - Has remained afebrile. ID following. Cont Ertapenem for now.           Constipation    - Enema ordered 1/20 and 1/21 with BM.  - Encourage ambulation.  - Decrease narcotics.  - D/c metamucil and miralax bid as makes patient nauseated.  - Increase reglan qid.  - Small bowel follow through with normal findings.   - Schedule bisacodyl for bowel regimen.   - KUB 2/21 with significant amounts of stool and gas. Pt declines miralax, metamucil, colace.   - encouraged suppository.  - Improved on KUB 2/23.  - Pt moving bowels now but will have to keep aggressive bowel regimen on board.   - Brown bomb 3/5 with BM x 1.        Other ascites    - Paracentesis performed 1/11/18 with 1600 ml removed.    - fluid negative for infection.   - Repeat para 1/19 negative for infection per cell count.  - no fluid seen for paracentesis on 1/23.  - Para 3/6 (diagnostic) negative for infection; wbc 28 segs 3%.        Severe protein-calorie malnutrition    - Poor PO intake since transplant requiring short course of TPN during previous hospital stay.   - albumin remains decreased but appetite slowly improving per pt.   - Dietary consulted. Will need to closely monitor PO intake.   - supplements also ordered.   - SHANE with tube feeding start 1/16. Stopped 1/17/18, was not tolerating. Merrick removed 1/20.  - prealbumin 7 on 1/15.  - TPN started 1/16/18.   - Pt able to eat minimally 1/21 and 1/22 which is an improvement.  - GI consulted - EGD reviewed, increased ppi bid, miralax bid, and metamucil bid, decreased narcotics.  - continue reglan, placed SHANE tube with tube feeds - transitioned to nightly feeds. Not tolerating well due to nausea.   - small bowel follow through with normal findings.   - consulted gi for possible gj tube placement, holding off on this for now as with ongoing fevers.  - Dietary changed tube feeds back to 24 hours from nightly 2/19 as not eating enough during the day to  meet caloric needs. TF on hold since 2/20.  - retrial marinol 2/22 as pt reports that's the only thing helping his appetite, in the evening 2/2 concern for possible drowsiness  - Pt then developed persistent N/V again and TPN started 2/24. Cont TPN for now.   - Pt able to eat 1/4 sandwich without vomiting 2/25.  Not been able to eat anything more than that in 48 hours per pt and mother.    - EGD 3/6 with normal esophagus    - congested, erythematous and nodular mucosa in the stomach.    - biliary stent in the duodenum    - one duodenal ulcer with a clean ulcer base (Emir Class III).    - biopsies taken in the duodenal bulb and second and third portion of the duodenum.        Nausea and vomiting    - h/o constipation, reports having regular BM's with bowel regimen.  - vomiting episode x 1 at home. Anti-emetics ordered PRN.  - KUB repeated 1/14/18 given increased abdominal pain- mild, generalized bowel distention suggesting ileus.  Diet changed to clears as tolerated for bowel rest .  - SHANE tube placed 1/16 for tube feeds. Not tolerating tube feeds, stopped 1/17/18.  - D/C shane 1/19, now in place as of 1/25.  - chronic constipation, Cont bowel regimen.   - See protein calorie malnutrition.  - TF have been on hold since 2/20 for N/V. shane d/c'd 2/23 as not tolerating TF  - 2/24 changes: Increased Reglan to 10 mg, D/C atovaquone, transitioned cyclo to Prograf, change twyla to BID, check Keppra level  - GI consulted. EGD 3/6 as with continued nausea/vomiting with po intake. Appreciate GI recs.    - Cont to have nausea and vomiting w medications and eating.  Cont TPN.               Recurrent pleural effusion on right    - CXR in ER with large right pleural effusion with subjective c/o worsening SOB.  - thoracentesis completed- 1200 ml removed.  SOB with significant improvement.   - Cell count reviewed and negative for infection.   - resp even and non labored.  O2 sat 94-98%.  - Repeat thora 1/19 negative for infection per  cell count.  - Reaccumulating fluid on xray 1/21.  - thoracentesis 1/31 - 1.4L removed, negative for infection.  - Reaccumulating fluid again on xray 2/10.   - Thoracentesis performed  2/16 with 1500 cc off, cell count neg for infection, cx no growth to date.   - CT A/P 2/26 with large R pleural effusion, may need repeat thora if fevers reoccur.          Biliary stricture of transplanted liver    - tbili with increase prompting ERCP 1/30 with stones and sludge, stents placed.  - Repeat ERCP 2/19 as was having recurrent fevers with bilirubin stuck at 2.0-2.2 with overall unremarkable findings seen. Did place new larger stents. Bilirubin now improving. Will need repeat ERCP in 10 weeks.   - US 3/2 stent in CBD.        Acute renal failure with tubular necrosis    - HD resumed on previous admission. Now anuric and dialyzes M-W-F.  - Nephrology following.    - ktm consulted for possible kidney transplant.            VTE Risk Mitigation         Ordered     heparin (porcine) injection 1,000 Units  As needed (PRN)     Route:  Intra-Catheter        02/05/18 0945     heparin (porcine) injection 5,000 Units  Every 8 hours     Route:  Subcutaneous        01/31/18 0957     Medium Risk of VTE  Once      01/11/18 0351     Place sequential compression device  Until discontinued      01/11/18 0351          The patients clinical status was discussed at multidisplinary rounds, involving transplant surgery, transplant medicine, pharmacy, nursing, nutrition, and social work    Discharge Planning: not a candidate for dc at this time.  Monitor kidney function --> may need outpt HD  Monitor HH needs vs rehab for deconditioned status      Mandy Crespo NP  Liver Transplant  [facility]

## 2018-03-07 NOTE — PROGRESS NOTES
Patient received from floor with report from Khadijah BAUM RN. Maintenance dialysis began per orders via right IJ tunneled catheter.

## 2018-03-07 NOTE — SUBJECTIVE & OBJECTIVE
Interval History:   Patient evaluated at bedside in the [facility], no significant event overnight, has been complaining of some abdominal discomfort, no respiratory distress, hemodynamically stable.     Review of patient's allergies indicates:   Allergen Reactions    Bactrim [sulfamethoxazole-trimethoprim] Other (See Comments)     Mookie Trell Syndrome     Current Facility-Administered Medications   Medication Frequency    0.9%  NaCl infusion (for blood administration) Q24H PRN    0.9%  NaCl infusion PRN    0.9%  NaCl infusion Once    acetaminophen tablet 650 mg Q6H PRN    albuterol-ipratropium 2.5mg-0.5mg/3mL nebulizer solution 3 mL Q4H PRN    bisacodyl EC tablet 10 mg Daily    bisacodyl suppository 10 mg Daily PRN    cycloSPORINE modified capsule 150 mg BID    dextrose 50% injection 12.5 g PRN    dextrose 50% injection 25 g PRN    docusate sodium capsule 100 mg BID    dronabinol capsule 2.5 mg Daily    epoetin maurisio injection 10,000 Units Every Mon, Wed, Fri    ertapenem (INVANZ) 0.5 g in sodium chloride 0.9% 100 mL IVPB Q24H    glucagon (human recombinant) injection 1 mg PRN    glucose chewable tablet 16 g PRN    glucose chewable tablet 24 g PRN    heparin (porcine) injection 1,000 Units PRN    heparin (porcine) injection 5,000 Units Q8H    isavuconazonium sulfate 372 mg in sodium chloride 0.9% 250 mL Q24H    levetiracetam oral soln Soln 500 mg BID    levothyroxine tablet 75 mcg Before breakfast    metoclopramide HCl 5 mg/5 mL solution 10 mg QID (AC & HS)    multivitamin tablet 1 tablet Daily    naloxone 0.4 mg/mL injection 0.2 mg PRN    omeprazole capsule 40 mg QAM    ondansetron disintegrating tablet 8 mg Q6H PRN    ondansetron injection 4 mg Q6H PRN    oxyCODONE immediate release tablet 10 mg Q6H PRN    polyethylene glycol packet 17 g BID    prochlorperazine injection Soln 10 mg Q6H PRN    psyllium husk (aspartame) 3.4 gram PwPk 1 packet BID    simethicone chewable tablet 80 mg TID  PRN    sodium chloride 0.9% flush 3 mL PRN    TPN ADULT CENTRAL LINE CUSTOM Continuous    ursodiol oral suspension (conc: 60 mg/mL) 300 mg BID       Objective:     Vital Signs (Most Recent):  Temp: 98.5 °F (36.9 °C) (03/07/18 0750)  Pulse: 73 (03/07/18 0900)  Resp: 18 (03/07/18 0900)  BP: 129/85 (03/07/18 0900)  SpO2: 98 % (03/07/18 0750)  O2 Device (Oxygen Therapy): room air (03/07/18 0750) Vital Signs (24h Range):  Temp:  [98.3 °F (36.8 °C)-99.4 °F (37.4 °C)] 98.5 °F (36.9 °C)  Pulse:  [73-90] 73  Resp:  [14-20] 18  SpO2:  [95 %-99 %] 98 %  BP: (122-152)/(70-86) 129/85     Weight: 71.2 kg (156 lb 15.5 oz) (03/07/18 0538)  Body mass index is 25.34 kg/m².  Body surface area is 1.82 meters squared.    I/O last 3 completed shifts:  In: 3613.1 [P.O.:410; I.V.:1100; IV Piggyback:200]  Out: 100 [Other:100]    Physical Exam   Constitutional: He is oriented to person, place, and time.   HENT:   Mouth/Throat: No oropharyngeal exudate.   Eyes: No scleral icterus.   Cardiovascular: Normal rate and regular rhythm.    Pulmonary/Chest: Effort normal and breath sounds normal. No respiratory distress. He has no wheezes. He has no rales.   Abdominal: Soft. There is tenderness. There is no rebound.   Musculoskeletal: He exhibits edema.   Lymphadenopathy:     He has no cervical adenopathy.   Neurological: He is alert and oriented to person, place, and time.   Skin: He is not diaphoretic.       Significant Labs:  ABGs: No results for input(s): PH, PCO2, HCO3, POCSATURATED, BE in the last 168 hours.  BMP:   Recent Labs  Lab 03/07/18  0500   GLU 82      CO2 21*   BUN 62*   CREATININE 4.1*   CALCIUM 9.8   MG 2.1     CBC:   Recent Labs  Lab 03/07/18  0500   WBC 8.69   RBC 2.58*   HGB 8.0*   HCT 24.3*      MCV 94   MCH 31.0   MCHC 32.9     CMP:   Recent Labs  Lab 03/07/18  0500   GLU 82   CALCIUM 9.8   ALBUMIN 1.8*   PROT 5.5*      K 4.0   CO2 21*      BUN 62*   CREATININE 4.1*   ALKPHOS 153*   ALT 6*   AST 50*    BILITOT 2.1*     All labs within the past 24 hours have been reviewed.

## 2018-03-08 ENCOUNTER — ANESTHESIA (OUTPATIENT)
Dept: ENDOSCOPY | Facility: HOSPITAL | Age: 28
DRG: 628 | End: 2018-03-08
Payer: COMMERCIAL

## 2018-03-08 ENCOUNTER — ANESTHESIA EVENT (OUTPATIENT)
Dept: ENDOSCOPY | Facility: HOSPITAL | Age: 28
DRG: 628 | End: 2018-03-08
Payer: COMMERCIAL

## 2018-03-08 PROBLEM — E55.9 VITAMIN D DEFICIENCY: Status: ACTIVE | Noted: 2018-03-08

## 2018-03-08 LAB
ALBUMIN SERPL BCP-MCNC: 1.8 G/DL
ALP SERPL-CCNC: 153 U/L
ALT SERPL W/O P-5'-P-CCNC: 8 U/L
ANION GAP SERPL CALC-SCNC: 9 MMOL/L
ANISOCYTOSIS BLD QL SMEAR: SLIGHT
AST SERPL-CCNC: 48 U/L
BACTERIA BLD CULT: NORMAL
BASOPHILS # BLD AUTO: ABNORMAL K/UL
BASOPHILS NFR BLD: 0 %
BILIRUB SERPL-MCNC: 2.1 MG/DL
BUN SERPL-MCNC: 40 MG/DL
CALCIUM SERPL-MCNC: 8.9 MG/DL
CHLORIDE SERPL-SCNC: 100 MMOL/L
CO2 SERPL-SCNC: 28 MMOL/L
CREAT SERPL-MCNC: 3 MG/DL
CYCLOSPORINE BLD LC/MS/MS-MCNC: 176 NG/ML
DIFFERENTIAL METHOD: ABNORMAL
DOHLE BOD BLD QL SMEAR: PRESENT
EOSINOPHIL # BLD AUTO: ABNORMAL K/UL
EOSINOPHIL NFR BLD: 1 %
ERYTHROCYTE [DISTWIDTH] IN BLOOD BY AUTOMATED COUNT: 17.6 %
EST. GFR  (AFRICAN AMERICAN): 31.2 ML/MIN/1.73 M^2
EST. GFR  (NON AFRICAN AMERICAN): 27 ML/MIN/1.73 M^2
FINAL DIAGNOSIS - CHIMERISM SORT: NORMAL
GLUCOSE SERPL-MCNC: 90 MG/DL
HCT VFR BLD AUTO: 23.8 %
HGB BLD-MCNC: 7.8 G/DL
IMM GRANULOCYTES # BLD AUTO: ABNORMAL K/UL
IMM GRANULOCYTES NFR BLD AUTO: ABNORMAL %
LYMPHOCYTES # BLD AUTO: ABNORMAL K/UL
LYMPHOCYTES NFR BLD: 18 %
MAGNESIUM SERPL-MCNC: 1.8 MG/DL
MCH RBC QN AUTO: 30.8 PG
MCHC RBC AUTO-ENTMCNC: 32.8 G/DL
MCV RBC AUTO: 94 FL
METAMYELOCYTES NFR BLD MANUAL: 1 %
MONOCYTES # BLD AUTO: ABNORMAL K/UL
MONOCYTES NFR BLD: 7 %
NEUTROPHILS NFR BLD: 69 %
NEUTS BAND NFR BLD MANUAL: 4 %
NRBC BLD-RTO: 0 /100 WBC
OVALOCYTES BLD QL SMEAR: ABNORMAL
PHOSPHATE SERPL-MCNC: 2.9 MG/DL
PLATELET # BLD AUTO: 174 K/UL
PMV BLD AUTO: 11.2 FL
POIKILOCYTOSIS BLD QL SMEAR: SLIGHT
POLYCHROMASIA BLD QL SMEAR: ABNORMAL
POTASSIUM SERPL-SCNC: 3.6 MMOL/L
PROT SERPL-MCNC: 5.4 G/DL
RBC # BLD AUTO: 2.53 M/UL
SODIUM SERPL-SCNC: 137 MMOL/L
SPECIMEN TYPE -CHIMERISM SORT: NORMAL
TRIGL SERPL-MCNC: 224 MG/DL
WBC # BLD AUTO: 7.93 K/UL

## 2018-03-08 PROCEDURE — 97803 MED NUTRITION INDIV SUBSEQ: CPT | Performed by: DIETITIAN, REGISTERED

## 2018-03-08 PROCEDURE — 25000003 PHARM REV CODE 250: Performed by: NURSE PRACTITIONER

## 2018-03-08 PROCEDURE — 63600175 PHARM REV CODE 636 W HCPCS: Performed by: NURSE PRACTITIONER

## 2018-03-08 PROCEDURE — 84478 ASSAY OF TRIGLYCERIDES: CPT

## 2018-03-08 PROCEDURE — 99900035 HC TECH TIME PER 15 MIN (STAT)

## 2018-03-08 PROCEDURE — B4185 PARENTERAL SOL 10 GM LIPIDS: HCPCS | Performed by: NURSE PRACTITIONER

## 2018-03-08 PROCEDURE — 85007 BL SMEAR W/DIFF WBC COUNT: CPT

## 2018-03-08 PROCEDURE — 25000003 PHARM REV CODE 250: Performed by: PHYSICIAN ASSISTANT

## 2018-03-08 PROCEDURE — 94799 UNLISTED PULMONARY SVC/PX: CPT

## 2018-03-08 PROCEDURE — 85027 COMPLETE CBC AUTOMATED: CPT

## 2018-03-08 PROCEDURE — S0030 INJECTION, METRONIDAZOLE: HCPCS | Performed by: NURSE PRACTITIONER

## 2018-03-08 PROCEDURE — 84100 ASSAY OF PHOSPHORUS: CPT

## 2018-03-08 PROCEDURE — 63600175 PHARM REV CODE 636 W HCPCS: Mod: JG | Performed by: SURGERY

## 2018-03-08 PROCEDURE — 94664 DEMO&/EVAL PT USE INHALER: CPT

## 2018-03-08 PROCEDURE — 80053 COMPREHEN METABOLIC PANEL: CPT

## 2018-03-08 PROCEDURE — 99233 SBSQ HOSP IP/OBS HIGH 50: CPT | Mod: ,,, | Performed by: INTERNAL MEDICINE

## 2018-03-08 PROCEDURE — 63600175 PHARM REV CODE 636 W HCPCS: Performed by: PHYSICIAN ASSISTANT

## 2018-03-08 PROCEDURE — A4217 STERILE WATER/SALINE, 500 ML: HCPCS | Performed by: NURSE PRACTITIONER

## 2018-03-08 PROCEDURE — 87040 BLOOD CULTURE FOR BACTERIA: CPT

## 2018-03-08 PROCEDURE — 99233 SBSQ HOSP IP/OBS HIGH 50: CPT | Mod: ,,, | Performed by: NURSE PRACTITIONER

## 2018-03-08 PROCEDURE — 80158 DRUG ASSAY CYCLOSPORINE: CPT

## 2018-03-08 PROCEDURE — 20600001 HC STEP DOWN PRIVATE ROOM

## 2018-03-08 PROCEDURE — 83735 ASSAY OF MAGNESIUM: CPT

## 2018-03-08 PROCEDURE — 25000003 PHARM REV CODE 250: Performed by: SURGERY

## 2018-03-08 PROCEDURE — 36415 COLL VENOUS BLD VENIPUNCTURE: CPT

## 2018-03-08 PROCEDURE — 99233 SBSQ HOSP IP/OBS HIGH 50: CPT | Mod: ,,, | Performed by: PSYCHIATRY & NEUROLOGY

## 2018-03-08 PROCEDURE — 87040 BLOOD CULTURE FOR BACTERIA: CPT | Mod: 59

## 2018-03-08 RX ORDER — ERGOCALCIFEROL 1.25 MG/1
CAPSULE ORAL
Status: DISPENSED
Start: 2018-03-08 | End: 2018-03-09

## 2018-03-08 RX ORDER — HEPARIN SODIUM 5000 [USP'U]/ML
5000 INJECTION, SOLUTION INTRAVENOUS; SUBCUTANEOUS EVERY 8 HOURS
Status: DISCONTINUED | OUTPATIENT
Start: 2018-03-09 | End: 2018-03-08

## 2018-03-08 RX ORDER — HEPARIN SODIUM 5000 [USP'U]/ML
5000 INJECTION, SOLUTION INTRAVENOUS; SUBCUTANEOUS EVERY 8 HOURS
Status: DISCONTINUED | OUTPATIENT
Start: 2018-03-09 | End: 2018-04-10 | Stop reason: HOSPADM

## 2018-03-08 RX ORDER — VANCOMYCIN/0.9 % SOD CHLORIDE 1 G/100 ML
1000 PLASTIC BAG, INJECTION (ML) INTRAVENOUS ONCE
Status: COMPLETED | OUTPATIENT
Start: 2018-03-08 | End: 2018-03-08

## 2018-03-08 RX ORDER — URSODIOL 300 MG/1
CAPSULE ORAL
Status: DISCONTINUED
Start: 2018-03-08 | End: 2018-03-09 | Stop reason: WASHOUT

## 2018-03-08 RX ORDER — OXYCODONE HYDROCHLORIDE 5 MG/1
5 TABLET ORAL EVERY 6 HOURS PRN
Status: DISCONTINUED | OUTPATIENT
Start: 2018-03-08 | End: 2018-03-12

## 2018-03-08 RX ORDER — CEFEPIME HYDROCHLORIDE 1 G/1
1 INJECTION, POWDER, FOR SOLUTION INTRAMUSCULAR; INTRAVENOUS
Status: DISCONTINUED | OUTPATIENT
Start: 2018-03-08 | End: 2018-03-09

## 2018-03-08 RX ORDER — METRONIDAZOLE 500 MG/100ML
500 INJECTION, SOLUTION INTRAVENOUS
Status: DISCONTINUED | OUTPATIENT
Start: 2018-03-08 | End: 2018-03-19

## 2018-03-08 RX ORDER — LEVETIRACETAM 500 MG/1
TABLET ORAL
Status: DISCONTINUED
Start: 2018-03-08 | End: 2018-03-09 | Stop reason: WASHOUT

## 2018-03-08 RX ORDER — METRONIDAZOLE 500 MG/100ML
INJECTION, SOLUTION INTRAVENOUS
Status: DISPENSED
Start: 2018-03-08 | End: 2018-03-09

## 2018-03-08 RX ORDER — ERGOCALCIFEROL 1.25 MG/1
50000 CAPSULE ORAL
Status: DISCONTINUED | OUTPATIENT
Start: 2018-03-08 | End: 2018-04-10 | Stop reason: HOSPADM

## 2018-03-08 RX ORDER — CEFEPIME HYDROCHLORIDE 1 G/1
INJECTION, POWDER, FOR SOLUTION INTRAMUSCULAR; INTRAVENOUS
Status: DISPENSED
Start: 2018-03-08 | End: 2018-03-09

## 2018-03-08 RX ADMIN — SODIUM CHLORIDE: 9 INJECTION, SOLUTION INTRAVENOUS at 01:03

## 2018-03-08 RX ADMIN — CEFEPIME 1 G: 1 INJECTION, POWDER, FOR SOLUTION INTRAMUSCULAR; INTRAVENOUS at 05:03

## 2018-03-08 RX ADMIN — OXYCODONE HYDROCHLORIDE 10 MG: 5 TABLET ORAL at 10:03

## 2018-03-08 RX ADMIN — LEVOTHYROXINE SODIUM 75 MCG: 75 TABLET ORAL at 05:03

## 2018-03-08 RX ADMIN — DOCUSATE SODIUM 100 MG: 100 CAPSULE, LIQUID FILLED ORAL at 09:03

## 2018-03-08 RX ADMIN — OXYCODONE HYDROCHLORIDE 5 MG: 5 TABLET ORAL at 04:03

## 2018-03-08 RX ADMIN — OMEPRAZOLE 40 MG: 40 CAPSULE, DELAYED RELEASE ORAL at 07:03

## 2018-03-08 RX ADMIN — METRONIDAZOLE 500 MG: 500 INJECTION, SOLUTION INTRAVENOUS at 05:03

## 2018-03-08 RX ADMIN — URSODIOL 300 MG: 300 CAPSULE ORAL at 08:03

## 2018-03-08 RX ADMIN — ONDANSETRON HYDROCHLORIDE 4 MG: 2 INJECTION, SOLUTION INTRAMUSCULAR; INTRAVENOUS at 10:03

## 2018-03-08 RX ADMIN — SODIUM CHLORIDE: 234 INJECTION INTRAMUSCULAR; INTRAVENOUS; SUBCUTANEOUS at 10:03

## 2018-03-08 RX ADMIN — CYCLOSPORINE 125 MG: 100 CAPSULE, LIQUID FILLED ORAL at 05:03

## 2018-03-08 RX ADMIN — SOYBEAN OIL 250 ML: 20 INJECTION, SOLUTION INTRAVENOUS at 10:03

## 2018-03-08 RX ADMIN — URSODIOL 300 MG: 300 CAPSULE ORAL at 09:03

## 2018-03-08 RX ADMIN — LEVETIRACETAM 500 MG: 100 SOLUTION ORAL at 10:03

## 2018-03-08 RX ADMIN — VANCOMYCIN HYDROCHLORIDE 1 G: 10 INJECTION, POWDER, LYOPHILIZED, FOR SOLUTION INTRAVENOUS at 04:03

## 2018-03-08 RX ADMIN — OXYCODONE HYDROCHLORIDE 5 MG: 5 TABLET ORAL at 10:03

## 2018-03-08 RX ADMIN — LEVETIRACETAM 500 MG: 100 SOLUTION ORAL at 08:03

## 2018-03-08 RX ADMIN — OXYCODONE HYDROCHLORIDE 10 MG: 5 TABLET ORAL at 03:03

## 2018-03-08 RX ADMIN — ERGOCALCIFEROL 50000 UNITS: 1.25 CAPSULE ORAL at 05:03

## 2018-03-08 RX ADMIN — HEPARIN SODIUM 5000 UNITS: 5000 INJECTION, SOLUTION INTRAVENOUS; SUBCUTANEOUS at 05:03

## 2018-03-08 RX ADMIN — METOCLOPRAMIDE HYDROCHLORIDE 10 MG: 5 SOLUTION ORAL at 05:03

## 2018-03-08 RX ADMIN — BISACODYL 10 MG: 5 TABLET, COATED ORAL at 08:03

## 2018-03-08 RX ADMIN — THERA TABS 1 TABLET: TAB at 08:03

## 2018-03-08 NOTE — PROGRESS NOTES
[facility]  Neurology  Progress Note    Patient Name: Jhonny Diana  MRN: [id number]  Admission Date: 1/11/2018  Hospital Length of Stay: 56 days  Code Status: Full Code   Attending Provider: Nathanael Medina MD  Primary Care Physician: Primary Doctor No   Principal Problem:Fever      Subjective:     Interval History:   Cyclosporine and prograf on hold at the moment per transplant team. Planned for ERCP with stent exchange, EUS and repeat liver bx given recurrent fevers, elevated LFTs and bilirubin. Mental status largely unchanged overnight. Still mumbling and hard to understand at time. Not oriented to time.   Awaiting EEG    Current Facility-Administered Medications   Medication Dose Route Frequency Provider Last Rate Last Dose    0.9%  NaCl infusion (for blood administration)   Intravenous Q24H PRN Jin Perez NP        0.9%  NaCl infusion   Intravenous PRN Win Gentile MD        acetaminophen tablet 650 mg  650 mg Oral Q6H PRN Shereen Mcguire DNP   650 mg at 03/07/18 2147    albuterol-ipratropium 2.5mg-0.5mg/3mL nebulizer solution 3 mL  3 mL Nebulization Q4H PRN Sean Patel MD        bisacodyl EC tablet 10 mg  10 mg Oral Daily Fransisca Thomas PA-C   10 mg at 03/08/18 0827    bisacodyl suppository 10 mg  10 mg Rectal Daily PRN Margarita Swain MD   10 mg at 02/27/18 2219    ceFEPIme injection 1 g  1 g Intravenous Q12H Mandy Crespo NP        cycloSPORINE modified capsule 125 mg  125 mg Oral BID Mandy Crespo NP        dextrose 50% injection 12.5 g  12.5 g Intravenous PRN Citlali Bridges PA-C   12.5 g at 02/19/18 0835    dextrose 50% injection 25 g  25 g Intravenous PRN Citlali Bridges PA-C        diphenhydrAMINE-zinc acetate 1-0.1% cream   Topical (Top) TID PRN Shereen Mcguire DNP        docusate sodium capsule 100 mg  100 mg Oral BID Seda Camacho NP   100 mg at 03/07/18 2117    epoetin maurisio injection 10,000 Units  10,000 Units Intravenous  Every Mon, Wed, Fri Derrick Turner NP   10,000 Units at 03/07/18 1046    ergocalciferol capsule 50,000 Units  50,000 Units Oral Q7 Days Mandy Crespo NP        fat emulsion 20% infusion 250 mL  250 mL Intravenous Once Mandy Crespo NP        glucagon (human recombinant) injection 1 mg  1 mg Intramuscular PRN Citlali Bridges PA-C        glucose chewable tablet 16 g  16 g Oral PRN Citlali Bridges PA-C        glucose chewable tablet 24 g  24 g Oral PRN Citlali Bridges PA-C        heparin (porcine) injection 1,000 Units  1,000 Units Intra-Catheter PRN Derrick Turner NP   1,000 Units at 03/07/18 1138    [START ON 3/9/2018] heparin (porcine) injection 5,000 Units  5,000 Units Subcutaneous Q8H Mandy Crespo NP        isavuconazonium sulfate 372 mg in sodium chloride 0.9% 250 mL   Intravenous Q24H Nathanael Medina  mL/hr at 02/28/18 0101      levetiracetam oral soln Soln 500 mg  500 mg Oral BID Seda Camacho NP   500 mg at 03/08/18 0825    levothyroxine tablet 75 mcg  75 mcg Oral Before breakfast Shereen Mcguire DNP   75 mcg at 03/08/18 0501    metronidazole IVPB 500 mg  500 mg Intravenous Q8H Mandy Crespo NP        multivitamin tablet 1 tablet  1 tablet Oral Daily Citlali Bridges PA-C   1 tablet at 03/08/18 0825    naloxone 0.4 mg/mL injection 0.2 mg  0.2 mg Intravenous PRN Geronimo Vera PA-C        omeprazole capsule 40 mg  40 mg Oral QAM Seda Camacho NP   40 mg at 03/08/18 0700    ondansetron disintegrating tablet 8 mg  8 mg Oral Q6H PRN Shereen Mcguire DNP   8 mg at 01/19/18 2052    ondansetron injection 4 mg  4 mg Intravenous Q6H PRN Shereen Mcguire DNP   4 mg at 03/08/18 1031    oxyCODONE immediate release tablet 5 mg  5 mg Oral Q6H PRN Mandy Crespo NP        polyethylene glycol packet 17 g  17 g Oral BID Seda Camacho NP   Stopped at 02/12/18 2100    prochlorperazine injection Soln 10 mg  10 mg Intravenous Q6H PRN Shereen Mcguire DNP   10 mg at  03/06/18 1935    psyllium husk (aspartame) 3.4 gram PwPk 1 packet  3.4 g Oral BID Seda Camacho NP   Stopped at 02/12/18 2100    simethicone chewable tablet 80 mg  1 tablet Oral TID PRN Geronimo Vera PA-C        sodium chloride 0.9% flush 3 mL  3 mL Intravenous PRN Seb Rosario MD        TPN ADULT CENTRAL LINE CUSTOM   Intravenous Continuous Mandy Crespo NP 48 mL/hr at 03/07/18 2117      TPN ADULT CENTRAL LINE CUSTOM   Intravenous Continuous Mandy Crespo NP        ursodiol oral suspension (conc: 60 mg/mL) 300 mg  300 mg Oral BID Geronimo Vera PA-C   300 mg at 03/08/18 0825    vancomycin 1 gram/250 mL in sodium chloride 0.9% IVPB 1 g  1,000 mg Intravenous Once Mandy Crespo NP         Review of Systems   Constitutional: Positive for activity change and fatigue.   Neurological: Positive for speech difficulty and weakness. Negative for dizziness, facial asymmetry, numbness and headaches.   Psychiatric/Behavioral: Positive for confusion and decreased concentration.     Objective:     Vital Signs (Most Recent):  Temp: 100.2 °F (37.9 °C) (03/08/18 1625)  Pulse: 90 (03/08/18 1625)  Resp: 16 (03/08/18 1625)  BP: 133/80 (03/08/18 1625)  SpO2: (!) 93 % (03/08/18 1625) Vital Signs (24h Range):  Temp:  [99 °F (37.2 °C)-100.9 °F (38.3 °C)] 100.2 °F (37.9 °C)  Pulse:  [81-91] 90  Resp:  [16-20] 16  SpO2:  [93 %-96 %] 93 %  BP: (126-138)/(71-80) 133/80     Weight: 73.2 kg (161 lb 6 oz)  Body mass index is 26.05 kg/m².    Physical Exam    NEUROLOGICAL EXAMINATION:     MENTAL STATUS   Oriented to person.   Oriented to place.   Disoriented to month, date and day.   Follows 1 step commands.   Attention: decreased. Concentration: decreased.   Speech: (Low and mumbled)  Level of consciousness: drowsy ,  arousable by verbal stimuli    CRANIAL NERVES     CN III, IV, VI   Right pupil: Shape: regular. Reactivity: brisk.   Left pupil: Shape: regular. Reactivity: brisk.   Nystagmus: none    Ophthalmoparesis: none    CN VII   Facial expression full, symmetric.     CN IX, X   Palate: symmetric    CN XII   CN XII normal.     MOTOR EXAM   Muscle bulk: decreased  Overall muscle tone: normal       No asterixis   Generalized weakness 4+/5 throughout      GAIT AND COORDINATION        +postural tremor with FTN     Significant Labs:   Hemoglobin A1c: No results for input(s): HGBA1C in the last 720 hours.  Blood Culture:   Recent Labs  Lab 03/06/18  1714   LABBLOO No Growth to date  No Growth to date     CBC:   Recent Labs  Lab 03/07/18  0500 03/08/18  0500   WBC 8.69 7.93   HGB 8.0* 7.8*   HCT 24.3* 23.8*    174     CMP:   Recent Labs  Lab 03/07/18  0500 03/08/18  0500   GLU 82 90    137   K 4.0 3.6    100   CO2 21* 28   BUN 62* 40*   CREATININE 4.1* 3.0*   CALCIUM 9.8 8.9   MG 2.1 1.8   PROT 5.5* 5.4*   ALBUMIN 1.8* 1.8*   BILITOT 2.1* 2.1*   ALKPHOS 153* 153*   AST 50* 48*   ALT 6* 8*   ANIONGAP 14 9   EGFRNONAA 18.5* 27.0*     Inflammatory Markers: No results for input(s): SEDRATE, CRP, PROCAL in the last 48 hours.  Prealbumin: No results for input(s): PREALBUMIN in the last 48 hours.  Respiratory Culture: No results for input(s): GSRESP, RESPIRATORYC in the last 48 hours.  Urine Culture: No results for input(s): LABURIN in the last 48 hours.  Urine Studies: No results for input(s): COLORU, APPEARANCEUA, PHUR, SPECGRAV, PROTEINUA, GLUCUA, KETONESU, BILIRUBINUA, OCCULTUA, NITRITE, UROBILINOGEN, LEUKOCYTESUR, RBCUA, WBCUA, BACTERIA, SQUAMEPITHEL, HYALINECASTS in the last 48 hours.    Invalid input(s): WRIGHTSUR  All pertinent lab results from the past 24 hours have been reviewed.    Routine EEG (pending)    Significant Imaging: I have reviewed and interpreted all pertinent imaging results/findings within the past 24 hours.     MRI Brain WO contrast (3/6/18)  The ventricles are normal in size for age, without evidence of hydrocephalus.The previously identified T2/FLAIR signal hyperintensity  in the missy along with associated diffusion restriction has resolved. Stable single focus of T2/FLAIR signal hyperintensity in the right centrum semiovale, nonspecific.   No parenchymal mass, hemorrhage, edema or infarction.No extra-axial blood or fluid collections.The T2 skull base flow voids are preserved. Bone marrow signal intensity is unremarkable.The visualized paranasal sinuses and bilateral mastoid air cells are clear.    Assessment and Plan:     Encephalopathy    Slurred and mumbled speech reported since 3/1 with increased lethargy, confusion, arm movements while sleeping and gait difficulty.   Complicated hospital course with ATN requiring dialysis, superficial wound infection with wound vac, multiple admissions for fevers, peritonitis, persistent ascites, biliary strictures requiring ERCP and biliary stenting, pleural effusion s/p thoracentesis and malnutrition on TPN.    On exam, patient has decreased attention and concentration. Low and mumbled speech, but able to repeat phrases without difficulty or apparent receptive aphasia. Suspect mental status change on 3/1 is due to recent infection with Abx and possible s/e from prograf complicated by ICU delirium after being admitted for 55 days. CT head and MRI brain without contrast were unremarkable for acute intracranial pathology.     ->continue delirium precautions with good sleep hygiene and regulation of sleep/wake cycle. Limit interruptions in the middle of the night.   ->encouraged to limit sedating medications and narcotics as able      Seizure    Hx of seizure 11/2/17 described as generalized convulsions while on prograf. Started on Keppra 500mg BID. AMS with speech change, confusion and increased lethargy since 3/1 after prograf was restarted 2/24/18.     3/8/18-mental status unchanged since yesterday. No clinical events concerning for seizure per mom    -most recent levetiracetam level was on 2/26/18 (29.5) low/normal  -continue Keppra 500 mg BID  and routine EEG pending       At risk for opportunistic infections    Immunosuppressed s/p liver transplant 10/2017   -ID following   Acute renal failure with tubular necrosis    Nephrology following  dialysis for metabolic clearance and volume management         VTE Risk Mitigation         Ordered     heparin (porcine) injection 5,000 Units  Every 8 hours     Route:  Subcutaneous        03/08/18 1622     heparin (porcine) injection 1,000 Units  As needed (PRN)     Route:  Intra-Catheter        02/05/18 0945     Medium Risk of VTE  Once      01/11/18 0351     Place sequential compression device  Until discontinued      01/11/18 0351        Neurology to follow-up EEG result.     Irena Flowers PA-C  General Neurology Consult  [facility]uro Consult [facility] # [id number]

## 2018-03-08 NOTE — ASSESSMENT & PLAN NOTE
Slurred and mumbled speech reported since 3/1 with increased lethargy, confusion, arm movements while sleeping and gait difficulty.   Complicated hospital course with ATN requiring dialysis, superficial wound infection with wound vac, multiple admissions for fevers, peritonitis, persistent ascites, biliary strictures requiring ERCP and biliary stenting, pleural effusion s/p thoracentesis and malnutrition on TPN.    On exam, patient has decreased attention and concentration. Low and mumbled speech, but able to repeat phrases without difficulty or apparent receptive aphasia. Suspect mental status change on 3/1 is due to recent infection with Abx and possible s/e from prograf complicated by ICU delirium after being admitted for 55 days. CT head and MRI brain without contrast were unremarkable for acute intracranial pathology.     ->continue delirium precautions with good sleep hygiene and regulation of sleep/wake cycle. Limit interruptions in the middle of the night.   ->encouraged to limit sedating medications and narcotics as able

## 2018-03-08 NOTE — ASSESSMENT & PLAN NOTE
- Enema ordered 1/20 and 1/21 with BM.  - Encourage ambulation.  - Decrease narcotics.  - D/c metamucil and miralax bid as makes patient nauseated.  - Increase reglan qid.  - Small bowel follow through with normal findings.   - Schedule bisacodyl for bowel regimen.   - KUB 2/21 with significant amounts of stool and gas. Pt declines miralax, metamucil, colace.   - encouraged suppository.  - Improved on KUB 2/23.  - Pt moving bowels now but will have to keep aggressive bowel regimen on board.   - Brown bomb 3/5 with BM x 1.  - KUB 3/8 with findings concerning for a partial bowel obstruction.

## 2018-03-08 NOTE — PROGRESS NOTES
Called into patient's room - patient sitting up on side of bed, vomiting. Patient's mother reports seeing blood-tinged emesis. Zofran 4mg IV given with relief obtained. Temperature 100.6. Above information discussed with MARYBEL Crespo NP - team currently in rounds, to address above.

## 2018-03-08 NOTE — ASSESSMENT & PLAN NOTE
- tbili with increase prompting ERCP 1/30 with stones and sludge, stents placed.  - Repeat ERCP 2/19 as was having recurrent fevers with bilirubin stuck at 2.0-2.2 with overall unremarkable findings seen. Did place new larger stents. Bilirubin was improving. Now trending up daily again. Needed repeat ERCP in 10 weeks.   - plan for EUS biopsy and ERCP tomorrow 3/9. NPO after midnight.  - US 3/2 stent in CBD.

## 2018-03-08 NOTE — ASSESSMENT & PLAN NOTE
27-year-old male  - EtOH cirrhosis s/p DDLT 10/19/2017 CMV D+/R+  - Seizures on tacro, on cyclosporine and keppra  - ATN on HD MWF  - Multiple episodes of peritonitis s/p ex-laporoscopy 2/7/2018  - Persistent ascites / intraabdominal fluid collection (last para 3/6 with no peritonitis)  - Biliary strictures requiring multiple ERCPs with biliary stenting   - Pleural effusion s/p thoracentesis  - Malnutrition on TPN  - Erythematous, congested, nodular gastric mucosa 3/6/18 - biopsy pending  - Duodenal ulcer 3/6/2018 - biopsy pending  - AMS, likely toxic-metabolic, started while on ertapenem  - Partial SBO  - ESBL E. coli colonization  - Immunosuppression on prophylaxis - pentamidine, isavuconazole    Additional infectious evaluation has been negative (adeno 3/1, aspergillus 2/1, CMV 3/1, EBV 2/28, Indium scan 2/28).    Patient with no fevers while on ertapenem, but also with new onset of neurological symptoms including slurred speech, jerking, delirium. Yesterday, patient with recurrent fever once ertapenem discontinued (although ertapenem usually dosed at 21:00 and spiked fever at 20:00) suggesting uncontrolled bacterial infection, most likely from either biliary source or intraabdominal fluid collections.     Recommendation:  - Restart cefepime / metronidazole  - Follow-up repeat blood cultures  - Follow-up gastric / duodenal biopsy - send pathology for CMV, bacterial, AFB, fungal stains  - Agree with LP - send for cell count, protein, glucose, bacterial/fungal/AFB stain/culture, HSV PCR, CMV PCR, HHV6 PCR

## 2018-03-08 NOTE — PROGRESS NOTES
[facility]  Adult Nutrition  Progress Note    SUMMARY       Recommendations    Recommendation/Intervention:     Recommend resuming enteral feeding due to functioning gut - continue to endorse placement of J-Tube. Recommend resuming Peptamen 1.5 w/ Prebio and advancing to goal rate of 60mL/hr x 24hrs.     If to continue TPN, recommend increasing infusion carbohydrates to better meet EEN. 92g AA / 362g dex plus IV lipids daily will meet EEN and EPN. Current TPN infusion only meeting 65% EEN - with muscle wasting and weight loss, patient requires 2100 kcal and ~90g protein to prevent further weight loss.     Continue to encourage oral intake. RD following.     Goals: 1. Pt will consume/ tolerate >75% of EEN and EPN  Nutrition Goal Status: progressing towards goal  Communication of RD Recs: reviewed with physician    Reason for Assessment    Reason for Assessment: RD follow-up  Diagnosis: transplant/postoperative complications  Relevant Medical History: OLTx 10/19/2017, complicate post-op w/ LAURA ongoing, chronic malnutrition, cirrhosis 2' EtOH  Interdisciplinary Rounds: attended  General Information Comments: TPN running at ordered rate, pt ate 1/2 snickers bar last night and threw up. has had no other oral intake for many days; refuses to drink any type of boost  Nutrition Discharge Planning: Unable to determine at this time    Nutrition Risk Screen    Nutrition Risk Screen: large or nonhealing wound, burn or pressure ulcer    Nutrition/Diet History    Patient Reported Diet/Restrictions/Preferences: low salt  Typical Food/Fluid Intake: eats very little at this time  Food Preferences: No cultural or Roman Catholic food preferences noted  Do you have any cultural, spiritual, Roman Catholic conflicts, given your current situation?: no  (Retired) Meal/Snack Patterns: pt likes to cook, when feeling well will cook and then only eat bites  Supplemental Drinks or Food Habits:  (protein powders sometimes)  (RETIRED)  "Functional Status: able to prepare meals, able to purchase food, ambulatory  Factors Affecting Nutritional Intake: altered gastrointestinal function, abdominal distention, decreased appetite, nausea/vomiting    Anthropometrics    Temp: 100.3 °F (37.9 °C)  Height Method: Stated  Height: 5' 6" (167.6 cm)  Height (inches): 66 in  Weight Method: Bed Scale  Weight: 73.2 kg (161 lb 6 oz)  Weight (lb): 161.38 lb  Ideal Body Weight (IBW), Male: 142 lb  % Ideal Body Weight, Male (lb): 112.68 lb  BMI (Calculated): 25.9  BMI Grade: 25 - 29.9 - overweight  Weight Loss: unintentional  Usual Body Weight (UBW), k kg (2017 )  % Usual Body Weight: 86.71  % Weight Change From Usual Weight: -13.47 %      Lab/Procedures/Meds    Diagnostic Test/Procedure Review: reviewed, pertinent  Pertinent Labs Reviewed: reviewed  Pertinent Labs Comments: BUN 40, Cr 3.0, GFR 31.2, TBili 2.1, PAB 9, AST 48,   Pertinent Medications Reviewed: reviewed  Pertinent Medications Comments: bisacodyl, epoetin, docusate, heparin, MVI, psyllium    Physical Findings/Assessment    Overall Physical Appearance: generalized wasting, loss of muscle mass, loss of subcutaneous fat, weak  Tubes:  (-)  Oral/Mouth Cavity: WDL  Skin: intact    Estimated/Assessed Needs    Weight Used For Calorie Calculations: 62.3 kg (137 lb 5.6 oz)  Height: 5' 6" (167.6 cm)  Energy Calorie Requirements (kcal): 1969-2343 (30-35 kcal/kg)  Energy Need Method: Kcal/kg    RMR ([place]-St. Jeor Equation): 1535.75  Protein Requirements: 81-94g (1.3-1.5 g/kg)  Weight Used For Protein Calculations: 62.3 kg (137 lb 5.6 oz)    Fluid Requirements (mL): 1mL/kcal  Fluid Need Method: RDA Method  RDA Method (mL):      Nutrition Prescription Ordered    Current Diet Order: NPO  Nutrition Order Comments: -  Current Nutrition Support Formula Ordered:  (Custom TPN 92g AA / 182g dex plus IV lipids)  Current Nutrition Support Rate Ordered: 48 (ml)  Current Nutrition Support Frequency Ordered: " mL/hr  Oral Nutrition Supplement: Boost Breeze    Evaluation of Received Nutrient/Fluid Intake      Parenteral Calories (kcal): 987  Parenteral Protein (gm): 92  Parenteral Fluid (mL): 1152  Lipid Calories (kcals): 500 kcals  GIR (Glucose Infusion Rate) (mg/kg/min): 1.89 mg/kg/min  Total Calories (kcal): 0  Total Calories (kcal/kg): 1487  % Kcal Needs: 79  % Protein Needs: 100  I/O: +8.3L since admit, HD 3x/week  Energy Calories Required: not meeting needs  Protein Required: meeting needs  Fluid Required: meeting needs  Comments: LBM 3/7  Tolerance: tolerating  % Intake of Estimated Energy Needs: 50 - 75 %  % Meal Intake: NPO    Nutrition Risk    Level of Risk/Frequency of Follow-up:  (2x/week)     Recommendations    Recommendation/Intervention: Recommend resuming enteral feeding - continue to endorse placement of J-Tube. Recommend resuming Peptamen 1.5 w/ Prebio and advancing to goal rate of 60mL/hr x 24hrs. Continue to encourage use of gut. RD following.   Goals: 1. Pt will consume/ tolerate >75% of EEN and EPN  Nutrition Goal Status: progressing towards goal  Communication of RD Recs: reviewed with physician    Assessment and Plan    Severe protein-calorie malnutrition    Nutrition Diagnosis  Inadequate oral intake    Related to (etiology):   Decreased appetite, abdominal distention    Signs and Symptoms (as evidenced by):   Pt eating 0% of meals and relying on enteral nutrition for 100% of EPN, EEN     Interventions/Recommendations (treatment strategy):  See recs    Nutrition Diagnosis Status:   Continues             Monitor and Eval    Food and Nutrient Intake: energy intake, food and beverage intake  Food and Nutrient Adminstration: diet order  Knowledge/Beliefs/Attitudes: food and nutrition knowledge/skill  Physical Activity and Function: nutrition-related ADLs and IADLs  Anthropometric Measurements: weight, weight change, body mass index  Biochemical Data, Medical Tests and Procedures: electrolyte and renal  panel, lipid profile, gastrointestinal profile, glucose/endocrine profile, inflammatory profile  Nutrition-Focused Physical Findings: overall appearance     Nutrition Follow-Up    RD Follow-up?: Yes

## 2018-03-08 NOTE — SUBJECTIVE & OBJECTIVE
Interval History:   Waxing and waning mental status  Slurred speech  Aox3  Nausea and vomiting  Diffuse abdominal pain     Review of Systems   Constitutional: Positive for fever. Negative for chills and diaphoresis.   HENT: Negative for rhinorrhea and sore throat.    Respiratory: Negative for cough and shortness of breath.    Cardiovascular: Negative for chest pain and leg swelling.   Gastrointestinal: Positive for abdominal pain, nausea and vomiting. Negative for diarrhea.   Genitourinary: Negative for dysuria and hematuria.   Musculoskeletal: Negative for arthralgias and myalgias.   Skin: Negative for rash.   Neurological: Positive for speech difficulty. Negative for headaches.     Objective:     Vital Signs (Most Recent):  Temp: 100.2 °F (37.9 °C) (03/08/18 1625)  Pulse: 90 (03/08/18 1625)  Resp: 16 (03/08/18 1625)  BP: 133/80 (03/08/18 1625)  SpO2: (!) 93 % (03/08/18 1625) Vital Signs (24h Range):  Temp:  [99 °F (37.2 °C)-100.9 °F (38.3 °C)] 100.2 °F (37.9 °C)  Pulse:  [81-91] 90  Resp:  [16-20] 16  SpO2:  [93 %-96 %] 93 %  BP: (126-138)/(71-80) 133/80     Weight: 73.2 kg (161 lb 6 oz)  Body mass index is 26.05 kg/m².    Estimated Creatinine Clearance: 33.1 mL/min (A) (based on SCr of 3 mg/dL (H)).    Physical Exam   Constitutional: He is oriented to person, place, and time. He appears distressed.   HENT:   Head: Normocephalic and atraumatic.   Eyes: Conjunctivae and EOM are normal.   Neck: Normal range of motion. Neck supple.   Cardiovascular: Normal rate.    Pulmonary/Chest: Effort normal. No respiratory distress.   Abdominal: Soft. He exhibits no distension. There is tenderness. There is no guarding.   Musculoskeletal: Normal range of motion. He exhibits no edema.   Neurological: He is alert and oriented to person, place, and time.   Skin: Skin is warm and dry. No rash noted. He is not diaphoretic. No erythema. There is pallor.   Psychiatric: He has a normal mood and affect. His behavior is normal.        Significant Labs: All pertinent labs within the past 24 hours have been reviewed.    Significant Imaging: I have reviewed all pertinent imaging results/findings within the past 24 hours.

## 2018-03-08 NOTE — ANESTHESIA PREPROCEDURE EVALUATION
[facility]  Anesthesia Pre-Operative Evaluation         Patient Name: Jhonny Diana  YOB: 1990  MRN: [id number]    SUBJECTIVE:     Pre-operative evaluation for Procedure(s) (LRB):  ESOPHAGOGASTRODUODENOSCOPY (EGD) (N/A)     03/08/2018    Jhonny Diana is a 27 y.o. male w/ a significant PMHx of asthma, alcoholic cirrhosis S/p DDLT 10/19/2017 c/b seizures, ATN requiring HD, superficial wound infection s/p wound vac to chevron incision, and multiple admissions for fevers and abdominal pain with RLQ loculation. Patient is currently undergoing extensive work up. Underwent EGD on 3/1/18. Patient continues to have nausea/vomiting and  now presents for the above procedure(s).      LDA:        Hemodialysis Catheter right internal jugular (Active)   Site Assessment Clean;Dry;Intact 3/5/2018  7:50 AM   Status Accessed 3/5/2018  7:50 AM   Flows Good 3/5/2018  7:50 AM   Dressing Intervention Dressing reinforced 3/5/2018  7:50 AM   Dressing Status Biopatch in place;Clean;Dry;Intact 3/5/2018  7:50 AM   Dressing Change Due 03/07/18 3/5/2018  7:50 AM   Verification by X-ray Yes 2/28/2018 11:30 AM   Site Condition No complications 3/5/2018  7:50 AM   Dressing Occlusive 3/2/2018  8:00 PM   Daily Line Review Performed 3/2/2018  8:00 PM   Arterial Volume (mL) 1.8 mL 2/28/2018 11:30 AM   Venous Volume (mL) 1.8 mL 2/28/2018 11:30 AM   Number of days:             Percutaneous Central Line Insertion/Assessment - triple lumen  02/24/18 1600 left internal jugular (Active)   Dressing biopatch in place;dressing dry and intact 3/4/2018  7:55 PM   Securement secured w/ sutures 3/4/2018  7:55 PM   Additional Site Signs no erythema;no warmth;no edema;no pain 3/4/2018  8:00 AM   Distal Patency/Care infusing 3/4/2018  8:00 AM   Medial Patency/Care infusing 3/4/2018  8:00 AM   Proximal Patency/Care normal saline locked 3/4/2018  8:00 AM   Dressing Change Due 03/11/18 3/4/2018 11:13 AM   Daily Line Review Performed 3/4/2018  11:13 AM   Number of days: 8            Midline Catheter Insertion/Assessment  - Single Lumen 02/06/18 0917 Left brachial vein 18g x 8cm (Active)   Site Assessment Clean;Dry;Intact;No redness;No swelling 3/4/2018  7:55 PM   IV Device Securement catheter securement device 3/4/2018  7:55 PM   Line Status Saline locked 3/4/2018  7:55 PM   Dressing Status Biopatch in place 3/3/2018  8:33 AM   Dressing Intervention Dressing reinforced 2/28/2018  7:18 PM   Dressing Change Due 03/06/18 3/4/2018  8:00 AM   Site Change Due 03/07/18 3/4/2018  8:00 AM   Reason Not Rotated Not due 3/4/2018  8:00 AM   Number of days: 27       Prev airway:     Present Prior to Hospital Arrival?: No; Placement Date: 02/19/18; Placement Time: 1439; Method of Intubation: Direct laryngoscopy; Inserted by: CRNA; Airway Device: Endotracheal Tube; Mask Ventilation: Easy; Intubated: Postinduction; Blade: Moore #2; Airway Device Size: 7.0; Style: Cuffed; Cuff Inflation: Minimal occlusive pressure; Inflation Amount: 6; Placement Verified By: Auscultation; Grade: Grade I; Complicating Factors: None; Intubation Findings: Positive EtCO2, Bilateral breath sounds, Atraumatic/Condition of teeth unchanged;  Depth of Insertion: 22; Securment: Lips; Complications: None; Breath Sounds: Equal Bilateral; Insertion Attempts: 1; Removal Date: 02/19/18;  Removal Time: 1526    Drips:      Patient Active Problem List   Diagnosis    Alcoholism /alcohol abuse    Acute renal failure with tubular necrosis    Liver transplanted    Prophylactic immunotherapy    Long-term use of immunosuppressant medication    At risk for opportunistic infections    Seizure    Delayed surgical wound healing    Anemia of chronic disease    Biliary stricture of transplanted liver    Severe malnutrition    Hypothyroidism    Recurrent pleural effusion on right    N&V (nausea and vomiting)    Nausea and vomiting    Severe protein-calorie malnutrition    Other ascites    Constipation     Fever    Other cytomegaloviral diseases    Other neutropenia    Kidney transplant candidate    Encephalopathy       Review of patient's allergies indicates:   Allergen Reactions    Bactrim [sulfamethoxazole-trimethoprim] Other (See Comments)     Mookie Trell Syndrome       Current Inpatient Medications:      Current Facility-Administered Medications on File Prior to Visit   Medication Dose Route Frequency Provider Last Rate Last Dose    0.9%  NaCl infusion (for blood administration)   Intravenous Q24H PRN Jin Perez NP        0.9%  NaCl infusion   Intravenous PRN Win Gentile MD        acetaminophen tablet 650 mg  650 mg Oral Q6H PRN Shereen Mcguire DNP   650 mg at 03/07/18 2147    albuterol-ipratropium 2.5mg-0.5mg/3mL nebulizer solution 3 mL  3 mL Nebulization Q4H PRN Sean Patel MD        bisacodyl EC tablet 10 mg  10 mg Oral Daily Fransisca Thomas PA-C   10 mg at 03/08/18 0827    bisacodyl suppository 10 mg  10 mg Rectal Daily PRN Margarita Swain MD   10 mg at 02/27/18 2219    dextrose 50% injection 12.5 g  12.5 g Intravenous PRN Citlali Bridges PA-C   12.5 g at 02/19/18 0835    dextrose 50% injection 25 g  25 g Intravenous PRN Citlali Bridges PA-C        diphenhydrAMINE-zinc acetate 1-0.1% cream   Topical (Top) TID PRN Shereen Mcguire DNP        docusate sodium capsule 100 mg  100 mg Oral BID Seda Camacho NP   100 mg at 03/07/18 2117    epoetin maurisio injection 10,000 Units  10,000 Units Intravenous Every Mon, Wed, Fri Derrick Turner NP   10,000 Units at 03/07/18 1046    ergocalciferol capsule 50,000 Units  50,000 Units Oral Q7 Days Mandy Crespo NP        fat emulsion 20% infusion 250 mL  250 mL Intravenous Once Mandy Crespo NP        glucagon (human recombinant) injection 1 mg  1 mg Intramuscular PRN Citlali Bridges PA-C        glucose chewable tablet 16 g  16 g Oral PRN Citlali Bridges PA-C        glucose chewable tablet 24 g  24 g  Oral PRN Citlali Bridges PA-C        heparin (porcine) injection 1,000 Units  1,000 Units Intra-Catheter PRN Derrick Turner NP   1,000 Units at 03/07/18 1138    [START ON 3/9/2018] heparin (porcine) injection 5,000 Units  5,000 Units Subcutaneous Q8H Shen Sousa MD        isavuconazonium sulfate 372 mg in sodium chloride 0.9% 250 mL   Intravenous Q24H Nathanael Medina  mL/hr at 02/28/18 0101      levetiracetam oral soln Soln 500 mg  500 mg Oral BID Seda T Voltz, NP   500 mg at 03/08/18 0825    levothyroxine tablet 75 mcg  75 mcg Oral Before breakfast Shereen Mcguire DNP   75 mcg at 03/08/18 0501    multivitamin tablet 1 tablet  1 tablet Oral Daily Citlali Bridges PA-C   1 tablet at 03/08/18 0825    naloxone 0.4 mg/mL injection 0.2 mg  0.2 mg Intravenous PRN Geronimo Vera PA-C        omeprazole capsule 40 mg  40 mg Oral QAM Seda T Voltz, NP   40 mg at 03/08/18 0700    ondansetron disintegrating tablet 8 mg  8 mg Oral Q6H PRN Shereen Mcguire DNP   8 mg at 01/19/18 2052    ondansetron injection 4 mg  4 mg Intravenous Q6H PRN Shereen Mcguire DNP   4 mg at 03/08/18 1031    oxyCODONE immediate release tablet 5 mg  5 mg Oral Q6H PRN Mandy Crespo NP        polyethylene glycol packet 17 g  17 g Oral BID Seda T Voltz, NP   Stopped at 02/12/18 2100    prochlorperazine injection Soln 10 mg  10 mg Intravenous Q6H PRN Shereen Mcguire DNP   10 mg at 03/06/18 1935    psyllium husk (aspartame) 3.4 gram PwPk 1 packet  3.4 g Oral BID Seda T Voltz, NP   Stopped at 02/12/18 2100    simethicone chewable tablet 80 mg  1 tablet Oral TID PRN Geronimo Vera PA-C        sodium chloride 0.9% flush 3 mL  3 mL Intravenous PRN Seb Rosario MD        TPN ADULT CENTRAL LINE CUSTOM   Intravenous Continuous Mandy Crespo, SAMREEN 48 mL/hr at 03/07/18 2117      TPN ADULT CENTRAL LINE CUSTOM   Intravenous Continuous Mandy Crespo, SAMREEN        ursodiol oral suspension (conc: 60 mg/mL) 300 mg  300 mg  Oral BID Geronimo Vera PA-C   300 mg at 03/08/18 0825     Current Outpatient Prescriptions on File Prior to Visit   Medication Sig Dispense Refill    aspirin (ECOTRIN) 81 MG EC tablet Take 1 tablet (81 mg total) by mouth once daily. 30 tablet 5    atovaquone (MEPRON) 750 mg/5 mL Susp Take 10 mLs (1,500 mg total) by mouth once daily. Stop on 4/17/18 300 mL 5    cycloSPORINE modified, NEORAL, 25 MG capsule Take 3 capsules (75 mg total) by mouth 2 (two) times daily. 90 capsule 5    docusate sodium (COLACE) 100 MG capsule Take 100 mg by mouth 3 (three) times daily as needed for Constipation.      ergocalciferol (ERGOCALCIFEROL) 50,000 unit Cap Take 1 capsule (50,000 Units total) by mouth every 7 days. 4 capsule 5    levETIRAcetam (KEPPRA) 500 MG Tab Take 1 tablet (500 mg total) by mouth 2 (two) times daily. 60 tablet 11    levothyroxine (SYNTHROID) 75 MCG tablet Take 1 tablet (75 mcg total) by mouth before breakfast. 30 tablet 11    mirtazapine (REMERON) 7.5 MG Tab Take 1 tablet (7.5 mg total) by mouth every evening. 30 tablet 11    multivitamin (THERAGRAN) tablet Take 1 tablet by mouth once daily. 30 tablet 11    omeprazole (PRILOSEC) 40 MG capsule Take 1 capsule (40 mg total) by mouth before breakfast. 30 capsule 11    ondansetron (ZOFRAN-ODT) 8 MG TbDL Take 1 tablet (8 mg total) by mouth every 8 (eight) hours as needed (nausea). 30 tablet 1    oxyCODONE (ROXICODONE) 10 mg Tab immediate release tablet Take 0.5-1 tablets (5-10 mg total) by mouth every 6 (six) hours as needed for Pain. 30 tablet 0    pantoprazole (PROTONIX) 40 MG tablet Take 1 tablet (40 mg total) by mouth once daily. 30 tablet 5    sodium bicarbonate 650 MG tablet Take 2 tablets (1,300 mg total) by mouth 2 (two) times daily. 120 tablet 2    thiamine 100 MG tablet Take 1 tablet (100 mg total) by mouth once daily. 30 tablet 6    ursodiol (ACTIGALL) 300 mg capsule Take 1 capsule (300 mg total) by mouth 2 (two) times daily. 60 capsule  11       Past Surgical History:   Procedure Laterality Date    APPENDECTOMY      ERCP      ESOPHAGOGASTRODUODENOSCOPY      LIVER TRANSPLANT         Social History     Social History    Marital status: Single     Spouse name: N/A    Number of children: N/A    Years of education: N/A     Occupational History    Not on file.     Social History Main Topics    Smoking status: Former Smoker     Packs/day: 1.00     Years: 10.00     Types: Cigarettes     Start date: 2/19/2017    Smokeless tobacco: Never Used    Alcohol use No      Comment: a fifth of liquor daily for years, cut back over last 2 months    Drug use: No    Sexual activity: Not on file     Other Topics Concern    Not on file     Social History Narrative    No narrative on file       OBJECTIVE:     Vital Signs Range (Last 24H):  Temp:  [36.4 °C (97.5 °F)-38.3 °C (100.9 °F)]   Pulse:  [81-91]   Resp:  [16-20]   BP: (126-138)/(71-78)   SpO2:  [93 %-96 %]       CBC:   Recent Labs      03/07/18   0500  03/08/18   0500   WBC  8.69  7.93   RBC  2.58*  2.53*   HGB  8.0*  7.8*   HCT  24.3*  23.8*   PLT  166  174   MCV  94  94   MCH  31.0  30.8   MCHC  32.9  32.8       CMP:   Recent Labs      03/07/18   0500  03/08/18   0500   NA  137  137   K  4.0  3.6   CL  102  100   CO2  21*  28   BUN  62*  40*   CREATININE  4.1*  3.0*   GLU  82  90   MG  2.1  1.8   PHOS  4.0  2.9   CALCIUM  9.8  8.9   ALBUMIN  1.8*  1.8*   PROT  5.5*  5.4*   ALKPHOS  153*  153*   ALT  6*  8*   AST  50*  48*   BILITOT  2.1*  2.1*       INR:  No results for input(s): PT, INR, PROTIME, APTT in the last 72 hours.    Diagnostic Studies:     EKG:     Test Reason : R50.9  Blood Pressure : 097/056 mmHG  Vent. Rate : 091 BPM     Atrial Rate : 091 BPM     P-R Int : 144 ms          QRS Dur : 084 ms      QT Int : 378 ms       P-R-T Axes : 048 040 049 degrees     QTc Int : 464 ms    Normal sinus rhythm  Normal ECG  When compared with ECG of 03-DEC-2017 20:16,  Nonspecific T wave abnormality now  evident in Lateral leads  Confirmed by Armando TEJADA, Jason Fox ([id number]) on 1/12/2018 11:22:33  AM    Referred By: [email or web address]   SELF           Confirmed By:Jason Roberts MD    2D ECHO:  Results for orders placed or performed during the hospital encounter of 12/03/17   2D echo with color flow doppler   Result Value Ref Range    EF 65 55 - 65    Mitral Valve Regurgitation TRIVIAL     Diastolic Dysfunction No     Pericardial Effusion TRIVIAL     Tricuspid Valve Regurgitation TRIVIAL          ASSESSMENT/PLAN:         Pre-op Assessment    I have reviewed the Patient Summary Reports.      I have reviewed the Medications.     Review of Systems  Anesthesia Hx:  No problems with previous Anesthesia  History of prior surgery of interest to airway management or planning: liver transplant. Previous anesthesia: General Denies Family Hx of Anesthesia complications.   Denies Personal Hx of Anesthesia complications.   Social:  Former Smoker, No Alcohol Use History of alcohol abuse   Hematology/Oncology:  Hematology Normal   Oncology Normal     EENT/Dental:EENT/Dental Normal   Cardiovascular:   Exercise tolerance: good Hypertension, well controlled    Pulmonary:  Pulmonary Normal    Renal/:   Chronic Renal Disease, ARF, Dialysis    Hepatic/GI:   GERD Liver Disease, Hepatitis (alcoholic) S/P liver transplant  Biliary stricture of transplanted liver   Musculoskeletal:  Musculoskeletal Normal    Neurological:   Seizures, well controlled    Endocrine:   Hypothyroidism    Dermatological:  Skin Normal    Psych:   Psychiatric History          Physical Exam  General:  Well nourished    Airway/Jaw/Neck:  Airway Findings: Mouth Opening: Normal Tongue: Normal  General Airway Assessment: Adult  Mallampati: II  TM Distance: Normal, at least 6 cm  Jaw/Neck Findings:  Neck ROM: Normal ROM  Neck Findings: Normal     Dental:  Dental Findings: In tact   Chest/Lungs:  Chest/Lungs Findings: Normal Respiratory Rate      Heart/Vascular:  Heart Findings: Rate: Normal  Rhythm: Regular Rhythm  Sounds: Normal  Heart murmur: negative Vascular Findings: Normal     Musculoskeletal:  Musculoskeletal Findings: Normal   Skin:  Skin Findings: Normal    Mental Status:  Mental Status Findings:  Cooperative         Anesthesia Plan  Type of Anesthesia, risks & benefits discussed:  Anesthesia Type:  general, MAC  Patient's Preference:   Intra-op Monitoring Plan: standard ASA monitors  Intra-op Monitoring Plan Comments:   Post Op Pain Control Plan:   Post Op Pain Control Plan Comments:   Induction:   IV  Beta Blocker:  Patient is not currently on a Beta-Blocker (No further documentation required).       Informed Consent: Patient representative understands risks and agrees with Anesthesia plan.  Questions answered. Anesthesia consent signed with patient representative.  ASA Score: 4     Day of Surgery Review of History & Physical: I have interviewed and examined the patient. I have reviewed the patient's H&P dated:  There are no significant changes.  H&P update referred to the provider.         Ready For Surgery From Anesthesia Perspective.

## 2018-03-08 NOTE — PLAN OF CARE
Problem: Patient Care Overview  Goal: Plan of Care Review  Outcome: Ongoing (interventions implemented as appropriate)  Patient lethargic today, speech slurred - difficult to understand at times. Oxycodone IR dose decreased to 5mg Q6H, marinol and Reglan d/c'd. EEG ordered per neuro recs - will be done tomorrow. Abdominal Xray done. ERCP planned for tomorrow with biospy. ID following - vanc given x1, cefepime and Flagyl ordered. Blood cultures done x2. Tmax 100.6 today. CYA level 176 - CYA resumed.

## 2018-03-08 NOTE — ASSESSMENT & PLAN NOTE
- Patient with stuttering speech, trouble recalling specific words when speaking. Does have hx of seizure (on keppra) previously while on prograf. Switched to prograf over the weekend from cyclosporine. Neuro exam unremarkable.   - CT head without contrast obtained, unremarkable.   - In light of these symptoms, prograf d/c, restart cyclosporine 3/1.  - remains w/o seizure. Cont to have stutter/garbled speech/difficulty finding words.  Worse post EGD as sedation was given.  - MRI 3/6 unremarkable.  - EEG scheduled today 3/8.  - monitor closely. VSS.

## 2018-03-08 NOTE — ASSESSMENT & PLAN NOTE
- Started on vanc/cefepime for ERCP. Now transitioned to vanc/zosyn.  - Blood cultures sent, prelim NGTD. Repeat blood cultures and chest xray today 2/10.  - thoracentesis 2/1 - fluid negative for infection.  - atelectasis noted on ct scan - start breathing tx & CPT.  - now cmv positive - started treatment 2/5/18.  - taken to OR 2/7 for exp lap - fluid collections drained, cultures pending.  - Patient with low grade fever 2/15, 100.3.  - CT A/P 2/15 with no evidence of IA fluid infection. Did show R pleural effusion s/p thoracentesis, cell count negative for infection, cx pending.  - Repeat blood cx 2/15 prelim NGTD, CMV PCR 2/15 negative   Complaining of abdominal pain. Is tender in RUQ.  - Remains with fevers.  - Bilirubin remains elevated. Concerning for cholangitis. Antibiotics broadened 2/17. ID re consulted.   - ERCP  2/19, overall unremarkable findings. Did place new larger stents. Will need repeat ERCP in 10 weeks. Bilirubin is stable post ERCP.  -Tmax 101 2/21 PM again ID following. Plan to transition vanc/zosyn to ertapenem 2/22 as no improvement noted.   - Erta started 2/22.  - CT A/P obtained 2/26 with focal/loculated region of fluid in the lower right abdomen measuring approximately 8.7 x 5.5 cm.   - IR attempted drainage on fluid collection on 2/27, however, per IR,there was limited fluid interposed with bowel in the RLQ with no safe pocket seen for drainage.  -Tagged WBC scan 2/27, negative results.  - Remains with low grade fevers.   - Per transplant attending, will hold off on EGD at this time as concern anesthesia will put patient at further increase risk for seizure activity. Will plan for potenial EGD on Monday. Discussed with GI, appreciate their assistance.  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 EBV PCR negative. Adenovirus pending. Repeat CMV PCR negative.    - Back pain and abdominal pain persists. Last BM 3/5 with brown bomb.   - Has remained afebrile. ID following. Cont  Ertapenem dc'd 3/7.  - tmax 100.9 overnight 3/8 - Cefepime/vanc restarted for procedures. Monitor.

## 2018-03-08 NOTE — PT/OT/SLP PROGRESS
Physical Therapy      Patient Name:  Jhonny Diana   MRN:  [id number]    Patient not seen today secondary to  (pt. going to endoscopy in PM). Will follow-up tomorrow.    Imer Barba, PT   3/8/2018

## 2018-03-08 NOTE — ASSESSMENT & PLAN NOTE
Hx of seizure 11/2/17 described as generalized convulsions while on prograf. Started on Keppra 500mg BID.   AMS with speech change, confusion and increased lethargy since 3/1 after prograf was restarted 2/24/18.     3/8/18-mental status unchanged since yesterday. No clinical events concerning for seizure per mom    -most recent levetiracetam level was on 2/26/18 (29.5) low/normal  -continue Keppra 500 mg BID and routine EEG pending

## 2018-03-08 NOTE — TREATMENT PLAN
AES Follow-up Note    Called by LTS, they are requesting for today an ERCP with stent exchange and EUS with liver biopsy given recurrent fever and rise in AST/ALT, bilirubin, ALP.     Keep patient NPO.  Hold heparin products today.  We will try to perform an EUS with liver biopsy and ERCP today.      Please do not hesitate to call us with questions.    Shen Sousa  Gastroenterology Fellow (PGY 6)

## 2018-03-08 NOTE — PLAN OF CARE
Pt AAOx4, SpO2>95% on room air.  Pt's temp was 100.5 and then check an hour later and was 100.9, PRN tylenol given at 2147.  PRN oxycodone given.  Pt's mother at bedside.  Pt up with standby assist.  Bed locked, in lowest position, bedrails up x2 and call bell in reach.  Fall precautions maintained.  See flowsheet for assessment findings.  Will continue to monitor.

## 2018-03-08 NOTE — SUBJECTIVE & OBJECTIVE
Subjective:     Interval History:   Cyclosporine and prograf on hold at the moment per transplant team. Planned for ERCP with stent exchange, EUS and repeat liver bx given recurrent fevers, elevated LFTs and bilirubin. Mental status largely unchanged overnight. Still mumbling and hard to understand at time. Not oriented to time.   Awaiting EEG    Current Facility-Administered Medications   Medication Dose Route Frequency Provider Last Rate Last Dose    0.9%  NaCl infusion (for blood administration)   Intravenous Q24H PRN Jin Perez NP        0.9%  NaCl infusion   Intravenous PRN Win Gentile MD        acetaminophen tablet 650 mg  650 mg Oral Q6H PRN Shereen Mcguire DNP   650 mg at 03/07/18 2147    albuterol-ipratropium 2.5mg-0.5mg/3mL nebulizer solution 3 mL  3 mL Nebulization Q4H PRN Sean Patel MD        bisacodyl EC tablet 10 mg  10 mg Oral Daily Fransisca Thomas PA-C   10 mg at 03/08/18 0827    bisacodyl suppository 10 mg  10 mg Rectal Daily PRN Margarita Swain MD   10 mg at 02/27/18 2219    ceFEPIme injection 1 g  1 g Intravenous Q12H Mandy Crespo NP        cycloSPORINE modified capsule 125 mg  125 mg Oral BID Mandy Crespo NP        dextrose 50% injection 12.5 g  12.5 g Intravenous PRN Citlali Bridges PA-C   12.5 g at 02/19/18 0835    dextrose 50% injection 25 g  25 g Intravenous PRN Citlali Bridges PA-C        diphenhydrAMINE-zinc acetate 1-0.1% cream   Topical (Top) TID PRN Shereen Mcguire DNP        docusate sodium capsule 100 mg  100 mg Oral BID Seda Camacho NP   100 mg at 03/07/18 2117    epoetin maurisio injection 10,000 Units  10,000 Units Intravenous Every Mon, Wed, Fri Derrick Turner NP   10,000 Units at 03/07/18 1046    ergocalciferol capsule 50,000 Units  50,000 Units Oral Q7 Days Mandy Crespo NP        fat emulsion 20% infusion 250 mL  250 mL Intravenous Once Mandy Crespo NP        glucagon (human recombinant) injection 1 mg  1  mg Intramuscular PRN Citlali Bridges PA-C        glucose chewable tablet 16 g  16 g Oral PRN Citlali Bridges PA-C        glucose chewable tablet 24 g  24 g Oral PRN Citlali Bridges PA-C        heparin (porcine) injection 1,000 Units  1,000 Units Intra-Catheter PRN Derrick Turner NP   1,000 Units at 03/07/18 1138    [START ON 3/9/2018] heparin (porcine) injection 5,000 Units  5,000 Units Subcutaneous Q8H Mandy Crespo NP        isavuconazonium sulfate 372 mg in sodium chloride 0.9% 250 mL   Intravenous Q24H Nathanael Medina  mL/hr at 02/28/18 0101      levetiracetam oral soln Soln 500 mg  500 mg Oral BID Seda T SAMREEN Camacho   500 mg at 03/08/18 0825    levothyroxine tablet 75 mcg  75 mcg Oral Before breakfast Shereen Mcguire DNP   75 mcg at 03/08/18 0501    metronidazole IVPB 500 mg  500 mg Intravenous Q8H Mandy Crespo NP        multivitamin tablet 1 tablet  1 tablet Oral Daily Citlali Bridges PA-C   1 tablet at 03/08/18 0825    naloxone 0.4 mg/mL injection 0.2 mg  0.2 mg Intravenous PRN Geronimo Vera PA-C        omeprazole capsule 40 mg  40 mg Oral QAM Seda Camacho NP   40 mg at 03/08/18 0700    ondansetron disintegrating tablet 8 mg  8 mg Oral Q6H PRN Shereen Mcguire DNP   8 mg at 01/19/18 2052    ondansetron injection 4 mg  4 mg Intravenous Q6H PRN Shereen Mcguire DNP   4 mg at 03/08/18 1031    oxyCODONE immediate release tablet 5 mg  5 mg Oral Q6H PRN Mandy Crespo NP        polyethylene glycol packet 17 g  17 g Oral BID Seda T SAMREEN Camacho   Stopped at 02/12/18 2100    prochlorperazine injection Soln 10 mg  10 mg Intravenous Q6H PRN Shereen Mcguire DNP   10 mg at 03/06/18 1935    psyllium husk (aspartame) 3.4 gram PwPk 1 packet  3.4 g Oral BID Seda T Voltz, NP   Stopped at 02/12/18 2100    simethicone chewable tablet 80 mg  1 tablet Oral TID PRN Geronimo Vera PA-C        sodium chloride 0.9% flush 3 mL  3 mL Intravenous PRN Seb Rosario MD        TPN  ADULT CENTRAL LINE CUSTOM   Intravenous Continuous Mandy Crespo, SAMREEN 48 mL/hr at 03/07/18 2117      TPN ADULT CENTRAL LINE CUSTOM   Intravenous Continuous Mandy Crespo, SAMREEN        ursodiol oral suspension (conc: 60 mg/mL) 300 mg  300 mg Oral BID Geronimo Vera PA-C   300 mg at 03/08/18 0825    vancomycin 1 gram/250 mL in sodium chloride 0.9% IVPB 1 g  1,000 mg Intravenous Once Mandy Crespo NP         Review of Systems   Constitutional: Positive for activity change and fatigue.   Neurological: Positive for speech difficulty and weakness. Negative for dizziness, facial asymmetry, numbness and headaches.   Psychiatric/Behavioral: Positive for confusion and decreased concentration.     Objective:     Vital Signs (Most Recent):  Temp: 100.2 °F (37.9 °C) (03/08/18 1625)  Pulse: 90 (03/08/18 1625)  Resp: 16 (03/08/18 1625)  BP: 133/80 (03/08/18 1625)  SpO2: (!) 93 % (03/08/18 1625) Vital Signs (24h Range):  Temp:  [99 °F (37.2 °C)-100.9 °F (38.3 °C)] 100.2 °F (37.9 °C)  Pulse:  [81-91] 90  Resp:  [16-20] 16  SpO2:  [93 %-96 %] 93 %  BP: (126-138)/(71-80) 133/80     Weight: 73.2 kg (161 lb 6 oz)  Body mass index is 26.05 kg/m².    Physical Exam    NEUROLOGICAL EXAMINATION:     MENTAL STATUS   Oriented to person.   Oriented to place.   Disoriented to month, date and day.   Follows 1 step commands.   Attention: decreased. Concentration: decreased.   Speech: (Low and mumbled)  Level of consciousness: drowsy ,  arousable by verbal stimuli    CRANIAL NERVES     CN III, IV, VI   Right pupil: Shape: regular. Reactivity: brisk.   Left pupil: Shape: regular. Reactivity: brisk.   Nystagmus: none   Ophthalmoparesis: none    CN VII   Facial expression full, symmetric.     CN IX, X   Palate: symmetric    CN XII   CN XII normal.     MOTOR EXAM   Muscle bulk: decreased  Overall muscle tone: normal       No asterixis   Generalized weakness 4+/5 throughout      GAIT AND COORDINATION        +postural tremor with FTN      Significant Labs:   Hemoglobin A1c: No results for input(s): HGBA1C in the last 720 hours.  Blood Culture:   Recent Labs  Lab 03/06/18  1714   LABBLOO No Growth to date  No Growth to date     CBC:   Recent Labs  Lab 03/07/18  0500 03/08/18  0500   WBC 8.69 7.93   HGB 8.0* 7.8*   HCT 24.3* 23.8*    174     CMP:   Recent Labs  Lab 03/07/18  0500 03/08/18  0500   GLU 82 90    137   K 4.0 3.6    100   CO2 21* 28   BUN 62* 40*   CREATININE 4.1* 3.0*   CALCIUM 9.8 8.9   MG 2.1 1.8   PROT 5.5* 5.4*   ALBUMIN 1.8* 1.8*   BILITOT 2.1* 2.1*   ALKPHOS 153* 153*   AST 50* 48*   ALT 6* 8*   ANIONGAP 14 9   EGFRNONAA 18.5* 27.0*     Inflammatory Markers: No results for input(s): SEDRATE, CRP, PROCAL in the last 48 hours.  Prealbumin: No results for input(s): PREALBUMIN in the last 48 hours.  Respiratory Culture: No results for input(s): GSRESP, RESPIRATORYC in the last 48 hours.  Urine Culture: No results for input(s): LABURIN in the last 48 hours.  Urine Studies: No results for input(s): COLORU, APPEARANCEUA, PHUR, SPECGRAV, PROTEINUA, GLUCUA, KETONESU, BILIRUBINUA, OCCULTUA, NITRITE, UROBILINOGEN, LEUKOCYTESUR, RBCUA, WBCUA, BACTERIA, SQUAMEPITHEL, HYALINECASTS in the last 48 hours.    Invalid input(s): WRIGHTSUR  All pertinent lab results from the past 24 hours have been reviewed.    Routine EEG (pending)    Significant Imaging: I have reviewed and interpreted all pertinent imaging results/findings within the past 24 hours.     MRI Brain WO contrast (3/6/18)  The ventricles are normal in size for age, without evidence of hydrocephalus.The previously identified T2/FLAIR signal hyperintensity in the missy along with associated diffusion restriction has resolved. Stable single focus of T2/FLAIR signal hyperintensity in the right centrum semiovale, nonspecific.   No parenchymal mass, hemorrhage, edema or infarction.No extra-axial blood or fluid collections.The T2 skull base flow voids are preserved. Bone  marrow signal intensity is unremarkable.The visualized paranasal sinuses and bilateral mastoid air cells are clear.

## 2018-03-08 NOTE — ASSESSMENT & PLAN NOTE
- CMV detected 2/1 now on tx dose valcyte. CMV PCR 2/15 not detected. CMV PCR 2/22, negative.  - Repeat CMV PCR 3/1 negative.  CMV PCR pending today.  - Pentam given 2/24.

## 2018-03-08 NOTE — SUBJECTIVE & OBJECTIVE
Scheduled Meds:   bisacodyl  10 mg Oral Daily    ceFEPime (MAXIPIME) IVPB  1 g Intravenous Q12H    cycloSPORINE modified  125 mg Oral BID    docusate sodium  100 mg Oral BID    epoetin maurisio (PROCRIT) injection  10,000 Units Intravenous Every Mon, Wed, Fri    ergocalciferol  50,000 Units Oral Q7 Days    fat emulsion 20%  250 mL Intravenous Once    [START ON 3/9/2018] heparin (porcine)  5,000 Units Subcutaneous Q8H    custom IVPB builder   Intravenous Q24H    levetiracetam oral soln  500 mg Oral BID    levothyroxine  75 mcg Oral Before breakfast    multivitamin  1 tablet Oral Daily    omeprazole  40 mg Oral QAM    polyethylene glycol  17 g Oral BID    psyllium husk (aspartame)  3.4 g Oral BID    ursodiol  300 mg Oral BID    vancomycin (VANCOCIN) IVPB  1,000 mg Intravenous Once     Continuous Infusions:   TPN ADULT CENTRAL LINE CUSTOM 48 mL/hr at 03/07/18 2117    TPN ADULT CENTRAL LINE CUSTOM       PRN Meds:sodium chloride, sodium chloride 0.9%, acetaminophen, albuterol-ipratropium 2.5mg-0.5mg/3mL, bisacodyl, dextrose 50%, dextrose 50%, diphenhydrAMINE-zinc acetate 1-0.1%, glucagon (human recombinant), glucose, glucose, heparin (porcine), naloxone, ondansetron, ondansetron, oxyCODONE, prochlorperazine, simethicone, sodium chloride 0.9%    Review of Systems   Constitutional: Positive for activity change, appetite change, fatigue and fever. Negative for chills.   HENT: Negative.  Negative for congestion and facial swelling.    Eyes: Negative for pain, discharge and visual disturbance.   Respiratory: Negative for cough, chest tightness, shortness of breath and wheezing.    Cardiovascular: Negative for chest pain, palpitations and leg swelling.   Gastrointestinal: Positive for abdominal distention, abdominal pain and nausea. Negative for constipation.   Endocrine: Negative.    Genitourinary: Positive for decreased urine volume. Negative for difficulty urinating.        Anuric   Musculoskeletal: Positive  for back pain. Negative for arthralgias and myalgias.   Skin: Positive for color change and wound. Negative for rash.   Allergic/Immunologic: Positive for immunocompromised state.   Neurological: Positive for speech difficulty and weakness. Negative for dizziness, seizures and headaches.   Psychiatric/Behavioral: Positive for decreased concentration and dysphoric mood. Negative for confusion. The patient is not nervous/anxious.    All other systems reviewed and are negative.    Objective:     Vital Signs (Most Recent):  Temp: 100.3 °F (37.9 °C) (03/08/18 1202)  Pulse: 91 (03/08/18 1202)  Resp: 16 (03/08/18 1202)  BP: 130/73 (03/08/18 1202)  SpO2: (!) 93 % (03/08/18 1202) Vital Signs (24h Range):  Temp:  [97.5 °F (36.4 °C)-100.9 °F (38.3 °C)] 100.3 °F (37.9 °C)  Pulse:  [81-91] 91  Resp:  [16-20] 16  SpO2:  [93 %-96 %] 93 %  BP: (126-138)/(71-78) 130/73     Weight: 73.2 kg (161 lb 6 oz)  Body mass index is 26.05 kg/m².    Intake/Output - Last 3 Shifts       03/06 0700 - 03/07 0659 03/07 0700 - 03/08 0659 03/08 0700 - 03/09 0659    P.O. 360 245     I.V. (mL/kg) 1100 (15.4)      Blood       Other  650     IV Piggyback 100      TPN 1433.8 935.7     Total Intake(mL/kg) 2993.8 (42) 1830.7 (25)     Urine (mL/kg/hr) 0 (0) 0 (0)     Emesis/NG output  0 (0)     Other 100 (0.1) 3650 (2.1)     Stool 0 (0) 0 (0)     Blood  0 (0)     Total Output 100 3650      Net +2893.8 -1819.3             Urine Occurrence  0 x     Stool Occurrence 1 x 1 x     Emesis Occurrence  0 x           Physical Exam   Constitutional: He is oriented to person, place, and time. He appears well-developed. No distress.   Temporal and distal extremity muscle wasting   HENT:   Head: Normocephalic and atraumatic.   Mouth/Throat: No oropharyngeal exudate.   Eyes: EOM are normal. Pupils are equal, round, and reactive to light. Scleral icterus is present.   Neck: Normal range of motion. Neck supple. No JVD present. No thyromegaly present.   Cardiovascular: Normal  rate, regular rhythm, normal heart sounds and intact distal pulses.    No murmur heard.  Pulmonary/Chest: Effort normal. No respiratory distress. He has decreased breath sounds in the right middle field, the right lower field and the left lower field. He has no wheezes. He exhibits no tenderness.   Diminished to RLL   Abdominal: Soft. Bowel sounds are normal. He exhibits distension and ascites. There is tenderness. There is no rebound and no guarding.   Dressing to chevron.  Wd vac removed 1/12/18  Dependent edema present R flank; surrounding erythema   Musculoskeletal: Normal range of motion. He exhibits edema (2+ LE edema). He exhibits no tenderness.   Neurological: He is alert and oriented to person, place, and time. He has normal reflexes.   Skin: Skin is warm and dry. Capillary refill takes 2 to 3 seconds. He is not diaphoretic. No erythema.   jaundice   Psychiatric: He has a normal mood and affect. His behavior is normal. Judgment and thought content normal. His mood appears not anxious.   Depressed mood   Nursing note and vitals reviewed.      Laboratory:  Immunosuppressants         Stop Route Frequency     cycloSPORINE modified capsule 125 mg      -- Oral 2 times daily        CBC:   Recent Labs  Lab 03/08/18  0500   WBC 7.93   RBC 2.53*   HGB 7.8*   HCT 23.8*      MCV 94   MCH 30.8   MCHC 32.8     CMP:   Recent Labs  Lab 03/08/18  0500   GLU 90   CALCIUM 8.9   ALBUMIN 1.8*   PROT 5.4*      K 3.6   CO2 28      BUN 40*   CREATININE 3.0*   ALKPHOS 153*   ALT 8*   AST 48*   BILITOT 2.1*     Labs within the past 24 hours have been reviewed.    Diagnostic Results:  I have personally reviewed all pertinent imaging studies.

## 2018-03-08 NOTE — ASSESSMENT & PLAN NOTE
- Post op course complicated by fevers and hyperbilirubinemia.  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  - Liver US 1/8 showed 3.9 cm complex fluid collection anterior to right lobe and moderate nonspecific complex ascites inferior to transplant.   - IR placed drain 1/11/18, cell count negative for infection.  - PBS consulted. ERCP 1/17 with stone and sludge. Pt placed on Actigall - treated with 10 day course of abx.   - Bili elevated - liver u/s 1/28.  - Liver biopsy 1/23 without rejection.  - consulted PBS for ERCP - Choledocholithiasis was found along with sludge, stents placed 1/30.   - ERCP 2/19, overall unremarkable findings. Did place new larger stents. Will need repeat ERCP in 10 weeks. Bilirubin improving.  - Liver US reviewed w mod ascites.  Stent in CBD.    - Paracentesis 3/6 negative for infection, wbc 28 segs 3%.  - ERCP, EUS biopsy scheduled tomorrow 3/9. T bili trending up daily, 2.1 today.

## 2018-03-08 NOTE — ASSESSMENT & PLAN NOTE
- detected at 370 on CMV PCR 2/1.  - case discussed with ID and will hold off on treatment at this time given low WBC.   - undetected CMV PCR 2/8.  - continue Valcyte 200 mg every Mon, Wed, Fri.  - CMV PCR 2/15 negative.  - Per ID, valcyte dose decreased to ppx dose on 2/18.  - CMV PCR 2/22 negative.   - CMV PCR 3/8 pending.  - Repeat scope 3/6 to assess persistent N/V and possible CMV causing these symptoms. Biopsies pending.

## 2018-03-08 NOTE — ASSESSMENT & PLAN NOTE
- Poor PO intake since transplant requiring short course of TPN during previous hospital stay.   - albumin remains decreased but appetite slowly improving per pt.   - Dietary consulted. Will need to closely monitor PO intake.   - supplements also ordered.   - SHANE with tube feeding start 1/16. Stopped 1/17/18, was not tolerating. Shane removed 1/20.  - prealbumin 7 on 1/15.  - TPN started 1/16/18.   - Pt able to eat minimally 1/21 and 1/22 which is an improvement.  - GI consulted - EGD reviewed, increased ppi bid, miralax bid, and metamucil bid, decreased narcotics.  - continue reglan, placed SHANE tube with tube feeds - transitioned to nightly feeds. Not tolerating well due to nausea.   - small bowel follow through with normal findings.   - consulted gi for possible gj tube placement, holding off on this for now as with ongoing fevers.  - Dietary changed tube feeds back to 24 hours from nightly 2/19 as not eating enough during the day to meet caloric needs. TF on hold since 2/20.  - retrial marinol 2/22 as pt reports that's the only thing helping his appetite, in the evening 2/2 concern for possible drowsiness  - Pt then developed persistent N/V again and TPN started 2/24. Cont TPN for now.   - Pt able to eat 1/4 sandwich without vomiting 2/25.  Not been able to eat anything more than that in 48 hours per pt and mother.    - EGD 3/6 with normal esophagus    - congested, erythematous and nodular mucosa in the stomach.    - biliary stent in the duodenum    - one duodenal ulcer with a clean ulcer base (Emir Class III).    - biopsies taken in the duodenal bulb and second and third portion of the duodenum.  - hold reglan for neuro symptoms.

## 2018-03-08 NOTE — PROGRESS NOTES
[facility]  Liver Transplant  Progress Note    Patient Name: Jhonny Diana  MRN: [id number]  Admission Date: 2018  Hospital Length of Stay: 56 days  Code Status: Full Code  Primary Care Provider: Primary Doctor No  Post-Operative Day: 140    ORGAN:   LIVER  Disease Etiology: Acute Alcoholic Hepatitis  Donor Type:    - Brain Death  CDC High Risk:   No  Donor CMV Status:   Donor CMV Status: Positive  Donor HBcAB:   Negative  Donor HCV Status:   Negative  Whole or Partial: Whole Liver  Biliary Anastomosis: End to End  Arterial Anatomy: Standard  Subjective:     History of Present Illness:  Jhonny Diana is a 28 y/o male with past medical history of alcoholic cirrhosis.  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 Found to have peritonitis in November (WBC 5000, 75% PNM) neg for bile leak. He was treated initially with vanc/cefepime. Repeat cell counts  with some improvement (WBC 1400, 45% PNM). He has undergone multiple paracenteses as well as abscess drainage of perihepatic fluid collections and treated with antimicrobial therapy but no positive cultures. Of note, biliary stricture also identified and ERCP performed on 2017 with sphincterotomy and biliary stent placed. Liver tests still have not normalized despite intervention, bilirubin and AP remain elevated. Other pertinent PMH current wound vac in place 2/2 wound infection, malnutrition requiring TPN for short course and ongoing hypoalbuminemia, and seizure activity while on prograf and has since been switched to cyclosporine without reoccurrence.  He presented to the ER for fever, abdominal pain, and N/V. He reports fever (103) for 1  day prior. Overnight, he developed N/V, reports small amount of green emesis with new left sided pain. He also endorses worsening SOB with exertion. He was scheduled as an outpatient for follow up paracentesis and IR drainage of fluid collection. CXR in ER shows large pleural effusion with subsegmental atelectasis. Infectious work up initiated in ER. His ANC is 900. Broad spectrum antibiotics initiated in ED. He denies chest pain, palpitations, diarrhea, constipation, or back pain. Denies any sick contacts.    Hospital Course:  Thoracentesis (1.2L off), Paracentesis (1.6L off), and IR drainage of fluid collection 1/11, all fluids negative for infection. ID consulted. Broad spectrum antibiotics d/c'd 1/15. Chronically malnourished with poor PO intake, prealbumin 7. Shane tube placed 1/15 for tube feedings.     ERCP 1/17 with sludge and a biliary stone which was removed and stent exchanged.  Remained with n/v; therefore, TF remained on hold and TPN continued.   Pt with fever s/p ERCP on 1/17 which continued until 1/19.  Vanc/cefepime restarted.  Blood cx 1/17 and 1/18 NGTD.  ID reconsulted.  Fungal markers sent.  CT C/A/P obtained.  With large R pleural effusion and ascites- both drained 1/19 and negative for infx per cell count. Pt afebrile 1/20. Liver biopsy 1/23 - without rejection.  EGD 1/25 - unremarkable for source of GI symptoms.  Resume diet along with other GI recommendations. SHANE tube placed for tube feeds.       2/22: Pt again spiked temp of 101 overnight, now down to 99. Resent Bcx overnight, prelim NGTD. Transitioned to Erta from vanc/zosyn per ID. Planning for tagged WBC scan on Monday. R sided abdominal erythema/dependent edema improving today.  TF have been on hold since 2/20. Will continue to hold today per pt request. Pt working on diet. KUB yesterday with significant stool and gas. Declines colace, miralax, metamucil, but willing to take daily bisacodyl. Pt agreeable to suppository today. If  suppository not successful can give mag citrate through dylan tonight. Will also retrial Marinol tonight as pt reported improvement in appetite previously. Will monitor closely for sedation.     2/23:  2 small BM 2/22.  Tolerated HD.  Gormania removed.  Will attempt to eat this PM.      2/24:  Pt ate then vomited hamburger 2/23.  Will resume TPN.  D/C atovaquone, transition cyclo to Prograf, check Keppra level, increase Reglan to 0 mg, change twyla to BID for nausea.  Will give pentam today.    2/25:  TPN going well, pt ate 1/2 subway sandwhich without vomiting yesterday, will continue to monitor.  Plan to repeat EGD this week.  Pt with drop in H/h again and appears to be hemolyzing.  LDH elevated, hapto slightly low, LEE pending.  With neutropenia- Neupogen given.    2/26:  Pt with severe back pain s/p HD. Unclear if secondary to neupogen.  Repeat labs stable. Indium scan scheduled for 2/27- injection done today (back pain started prior). CT A/P obtained with fluid collection seen in pelvis.    2/27: Patient spiking fevers, 101. Indium scan, negative results. CT A/P obtained 2/26 with focal/loculated region of fluid in the lower right abdomen measuring approximately 8.7 x 5.5 cm. IR attempted drainage, however, per IR,there was  limited fluid interposed with bowel in the RLQ with no safe pocket seen for drainage.    2/28: Remains with low grade fevers. Neupogen given.     3/1: Patient with stuttering speech, trouble recalling specific words when speaking. Does have hx of seizure (on keppra) previously while on prograf. Switched to prograf 2/24 from cyclosporine. Neuro exam unremarkable. CT head without contrast obtained, unremarkable. In light of these symptoms, prograf d/c, cyclosporine restarted 3/1. Per transplant attending, held off on EGD at this time as concern anesthesia would put patient at further increase risk for seizure activity.     3/4: pt walked in room yesterday.  Had 1 BM.  No appetite still.  Cont TPN.   He reports swelling and back pain today.  He vomited after taking Oxy PO.  Fentanyl 12.5mg IV ordered.  Patient rested well afterward.  Remains afebrile.   ID following.   Cont Ertapenem.  Repeat blood and urine cx given continued malaise and stutter- blood cx prelim NGTD.  UA ok.  Urine cx pending.    Tentative plan for EGD on Monday to assess source of fevers/nausea/abdominal pain w bx for CMV and GVHD.  Will also reassess rather able to perform paracentesis.     3/5: no acute events overnight. Pt still not feeling well and with poor sleep overnight. Cont to have mumbling of speech at times but slightly better than before. Remains with abdominal pain but relieved with PO pain meds. Tolerated HD well today. Will plan for paracentesis and EGD tomorrow to assess for peritonitis and cause of persistent N/V respectively.     3/6: pt continues to have garbled/mumbled speech. EGD performed today and pt received sedation during procedure. Increased lethargy and dysarthria noted post procedure. VS stable. Paracentesis scheduled today as well to r/o peritonitis. Wbc count stable. Afebrile. Continue Ertapenem. HD yesterday and pt tolerated well, 2.5 L removed. Monitor.     3/7: MRI brain yesterday unremarkable. Blood cultures ngtd so far. Neurology consulted. EGD and Paracentesis yesterday. Para with 25 cc removed, wbc 28 segs 3%. Continue TPN. Monitor.    3/8: EUS and ERCP scheduled tomorrow. NPO after midnight. T bili continues trending up daily. Last ERCP on 2/19 with two stents placed. Ertapenem dc'd 3/7. Pt spiking temps today, tmax 100.9. Cefepime/vanc started. Oxycodone decreased to 5 mg q6h prn. Dc marinol and reglan for now. He continues to slur/stutter and have jerky movements throughout the day. Neurology following. EEG scheduled today. Monitor.     Scheduled Meds:   bisacodyl  10 mg Oral Daily    ceFEPime (MAXIPIME) IVPB  1 g Intravenous Q12H    cycloSPORINE modified  125 mg Oral BID    docusate sodium  100  mg Oral BID    epoetin maurisio (PROCRIT) injection  10,000 Units Intravenous Every Mon, Wed, Fri    ergocalciferol  50,000 Units Oral Q7 Days    fat emulsion 20%  250 mL Intravenous Once    [START ON 3/9/2018] heparin (porcine)  5,000 Units Subcutaneous Q8H    custom IVPB builder   Intravenous Q24H    levetiracetam oral soln  500 mg Oral BID    levothyroxine  75 mcg Oral Before breakfast    multivitamin  1 tablet Oral Daily    omeprazole  40 mg Oral QAM    polyethylene glycol  17 g Oral BID    psyllium husk (aspartame)  3.4 g Oral BID    ursodiol  300 mg Oral BID    vancomycin (VANCOCIN) IVPB  1,000 mg Intravenous Once     Continuous Infusions:   TPN ADULT CENTRAL LINE CUSTOM 48 mL/hr at 03/07/18 2117    TPN ADULT CENTRAL LINE CUSTOM       PRN Meds:sodium chloride, sodium chloride 0.9%, acetaminophen, albuterol-ipratropium 2.5mg-0.5mg/3mL, bisacodyl, dextrose 50%, dextrose 50%, diphenhydrAMINE-zinc acetate 1-0.1%, glucagon (human recombinant), glucose, glucose, heparin (porcine), naloxone, ondansetron, ondansetron, oxyCODONE, prochlorperazine, simethicone, sodium chloride 0.9%    Review of Systems   Constitutional: Positive for activity change, appetite change, fatigue and fever. Negative for chills.   HENT: Negative.  Negative for congestion and facial swelling.    Eyes: Negative for pain, discharge and visual disturbance.   Respiratory: Negative for cough, chest tightness, shortness of breath and wheezing.    Cardiovascular: Negative for chest pain, palpitations and leg swelling.   Gastrointestinal: Positive for abdominal distention, abdominal pain and nausea. Negative for constipation.   Endocrine: Negative.    Genitourinary: Positive for decreased urine volume. Negative for difficulty urinating.        Anuric   Musculoskeletal: Positive for back pain. Negative for arthralgias and myalgias.   Skin: Positive for color change and wound. Negative for rash.   Allergic/Immunologic: Positive for  immunocompromised state.   Neurological: Positive for speech difficulty and weakness. Negative for dizziness, seizures and headaches.   Psychiatric/Behavioral: Positive for decreased concentration and dysphoric mood. Negative for confusion. The patient is not nervous/anxious.    All other systems reviewed and are negative.    Objective:     Vital Signs (Most Recent):  Temp: 100.3 °F (37.9 °C) (03/08/18 1202)  Pulse: 91 (03/08/18 1202)  Resp: 16 (03/08/18 1202)  BP: 130/73 (03/08/18 1202)  SpO2: (!) 93 % (03/08/18 1202) Vital Signs (24h Range):  Temp:  [97.5 °F (36.4 °C)-100.9 °F (38.3 °C)] 100.3 °F (37.9 °C)  Pulse:  [81-91] 91  Resp:  [16-20] 16  SpO2:  [93 %-96 %] 93 %  BP: (126-138)/(71-78) 130/73     Weight: 73.2 kg (161 lb 6 oz)  Body mass index is 26.05 kg/m².    Intake/Output - Last 3 Shifts       03/06 0700 - 03/07 0659 03/07 0700 - 03/08 0659 03/08 0700 - 03/09 0659    P.O. 360 245     I.V. (mL/kg) 1100 (15.4)      Blood       Other  650     IV Piggyback 100      TPN 1433.8 935.7     Total Intake(mL/kg) 2993.8 (42) 1830.7 (25)     Urine (mL/kg/hr) 0 (0) 0 (0)     Emesis/NG output  0 (0)     Other 100 (0.1) 3650 (2.1)     Stool 0 (0) 0 (0)     Blood  0 (0)     Total Output 100 3650      Net +2893.8 -1819.3             Urine Occurrence  0 x     Stool Occurrence 1 x 1 x     Emesis Occurrence  0 x           Physical Exam   Constitutional: He is oriented to person, place, and time. He appears well-developed. No distress.   Temporal and distal extremity muscle wasting   HENT:   Head: Normocephalic and atraumatic.   Mouth/Throat: No oropharyngeal exudate.   Eyes: EOM are normal. Pupils are equal, round, and reactive to light. Scleral icterus is present.   Neck: Normal range of motion. Neck supple. No JVD present. No thyromegaly present.   Cardiovascular: Normal rate, regular rhythm, normal heart sounds and intact distal pulses.    No murmur heard.  Pulmonary/Chest: Effort normal. No respiratory distress. He has  decreased breath sounds in the right middle field, the right lower field and the left lower field. He has no wheezes. He exhibits no tenderness.   Diminished to RLL   Abdominal: Soft. Bowel sounds are normal. He exhibits distension and ascites. There is tenderness. There is no rebound and no guarding.   Dressing to chevron.  Wd vac removed 1/12/18  Dependent edema present R flank; surrounding erythema   Musculoskeletal: Normal range of motion. He exhibits edema (2+ LE edema). He exhibits no tenderness.   Neurological: He is alert and oriented to person, place, and time. He has normal reflexes.   Skin: Skin is warm and dry. Capillary refill takes 2 to 3 seconds. He is not diaphoretic. No erythema.   jaundice   Psychiatric: He has a normal mood and affect. His behavior is normal. Judgment and thought content normal. His mood appears not anxious.   Depressed mood   Nursing note and vitals reviewed.      Laboratory:  Immunosuppressants         Stop Route Frequency     cycloSPORINE modified capsule 125 mg      -- Oral 2 times daily        CBC:   Recent Labs  Lab 03/08/18  0500   WBC 7.93   RBC 2.53*   HGB 7.8*   HCT 23.8*      MCV 94   MCH 30.8   MCHC 32.8     CMP:   Recent Labs  Lab 03/08/18  0500   GLU 90   CALCIUM 8.9   ALBUMIN 1.8*   PROT 5.4*      K 3.6   CO2 28      BUN 40*   CREATININE 3.0*   ALKPHOS 153*   ALT 8*   AST 48*   BILITOT 2.1*     Labs within the past 24 hours have been reviewed.    Diagnostic Results:  I have personally reviewed all pertinent imaging studies.    Assessment/Plan:     Delayed surgical wound healing    - wd vac removed 1/12/18.  Wound healing well. Aquacel AG and Mepilex border dressing applied via wound care recs.           Liver transplanted    - Post op course complicated by fevers and hyperbilirubinemia.  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  - Liver US 1/8 showed 3.9 cm complex fluid collection anterior to right lobe and moderate  nonspecific complex ascites inferior to transplant.   - IR placed drain 1/11/18, cell count negative for infection.  - PBS consulted. ERCP 1/17 with stone and sludge. Pt placed on Actigall - treated with 10 day course of abx.   - Bili elevated - liver u/s 1/28.  - Liver biopsy 1/23 without rejection.  - consulted PBS for ERCP - Choledocholithiasis was found along with sludge, stents placed 1/30.   - ERCP 2/19, overall unremarkable findings. Did place new larger stents. Will need repeat ERCP in 10 weeks. Bilirubin improving.  - Liver US reviewed w mod ascites.  Stent in CBD.    - Paracentesis 3/6 negative for infection, wbc 28 segs 3%.  - ERCP, EUS biopsy scheduled tomorrow 3/9. T bili trending up daily, 2.1 today.        Long-term use of immunosuppressant medication    - Maintenance IS with cyclosporine --> transitioned to Prograf 2/24 for continued nausea. However now with neurological symptoms with prograf and hx of seizure, prograf d/c and cyclosporine restarted 3/1.  - MMF on hold for infections and neutropenia.   - Continue to check cyclosporine and Prograf level daily during transition. Assess for toxicity and adjust level as needed.        Prophylactic immunotherapy    - See long term use of immunosuppression.         At risk for opportunistic infections    - CMV detected 2/1 now on tx dose valcyte. CMV PCR 2/15 not detected. CMV PCR 2/22, negative.  - Repeat CMV PCR 3/1 negative.  CMV PCR pending today.  - Pentam given 2/24.            Seizure    - Patient with stuttering speech, trouble recalling specific words when speaking. Does have hx of seizure (on keppra) previously while on prograf. Switched to prograf over the weekend from cyclosporine. Neuro exam unremarkable.   - CT head without contrast obtained, unremarkable.   - In light of these symptoms, prograf d/c, restart cyclosporine 3/1.  - remains w/o seizure. Cont to have stutter/garbled speech/difficulty finding words.  Worse post EGD as sedation was  given.  - MRI 3/6 unremarkable.  - EEG scheduled today 3/8.  - monitor closely. VSS.          Anemia of chronic disease    - H&H stable. Cont to monitor with daily cbc.         Vitamin D deficiency    - start ergo 50K weekly.           Other neutropenia    - WBC stable today.   - Neupogen last given 3/1.           Other cytomegaloviral diseases    - detected at 370 on CMV PCR 2/1.  - case discussed with ID and will hold off on treatment at this time given low WBC.   - undetected CMV PCR 2/8.  - continue Valcyte 200 mg every Mon, Wed, Fri.  - CMV PCR 2/15 negative.  - Per ID, valcyte dose decreased to ppx dose on 2/18.  - CMV PCR 2/22 negative.   - CMV PCR 3/8 pending.  - Repeat scope 3/6 to assess persistent N/V and possible CMV causing these symptoms. Biopsies pending.        Fever    - Started on vanc/cefepime for ERCP. Now transitioned to vanc/zosyn.  - Blood cultures sent, prelim NGTD. Repeat blood cultures and chest xray today 2/10.  - thoracentesis 2/1 - fluid negative for infection.  - atelectasis noted on ct scan - start breathing tx & CPT.  - now cmv positive - started treatment 2/5/18.  - taken to OR 2/7 for exp lap - fluid collections drained, cultures pending.  - Patient with low grade fever 2/15, 100.3.  - CT A/P 2/15 with no evidence of IA fluid infection. Did show R pleural effusion s/p thoracentesis, cell count negative for infection, cx pending.  - Repeat blood cx 2/15 prelim NGTD, CMV PCR 2/15 negative   Complaining of abdominal pain. Is tender in RUQ.  - Remains with fevers.  - Bilirubin remains elevated. Concerning for cholangitis. Antibiotics broadened 2/17. ID re consulted.   - ERCP  2/19, overall unremarkable findings. Did place new larger stents. Will need repeat ERCP in 10 weeks. Bilirubin is stable post ERCP.  -Tmax 101 2/21 PM again ID following. Plan to transition vanc/zosyn to ertapenem 2/22 as no improvement noted.   - Erta started 2/22.  - CT A/P obtained 2/26 with focal/loculated  region of fluid in the lower right abdomen measuring approximately 8.7 x 5.5 cm.   - IR attempted drainage on fluid collection on 2/27, however, per IR,there was limited fluid interposed with bowel in the RLQ with no safe pocket seen for drainage.  -Tagged WBC scan 2/27, negative results.  - Remains with low grade fevers.   - Per transplant attending, will hold off on EGD at this time as concern anesthesia will put patient at further increase risk for seizure activity. Will plan for potenial EGD on Monday. Discussed with GI, appreciate their assistance.  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 EBV PCR negative. Adenovirus pending. Repeat CMV PCR negative.    - Back pain and abdominal pain persists. Last BM 3/5 with brown bomb.   - Has remained afebrile. ID following. Cont Ertapenem dc'd 3/7.  - tmax 100.9 overnight 3/8 - Cefepime/vanc restarted for procedures. Monitor.           Constipation    - Enema ordered 1/20 and 1/21 with BM.  - Encourage ambulation.  - Decrease narcotics.  - D/c metamucil and miralax bid as makes patient nauseated.  - Increase reglan qid.  - Small bowel follow through with normal findings.   - Schedule bisacodyl for bowel regimen.   - KUB 2/21 with significant amounts of stool and gas. Pt declines miralax, metamucil, colace.   - encouraged suppository.  - Improved on KUB 2/23.  - Pt moving bowels now but will have to keep aggressive bowel regimen on board.   - Brown bomb 3/5 with BM x 1.  - KUB 3/8 with findings concerning for a partial bowel obstruction.        Other ascites    - Paracentesis performed 1/11/18 with 1600 ml removed.    - fluid negative for infection.   - Repeat para 1/19 negative for infection per cell count.  - no fluid seen for paracentesis on 1/23.  - Para 3/6 (diagnostic) negative for infection; wbc 28 segs 3%.        Severe protein-calorie malnutrition    - Poor PO intake since transplant requiring short course of TPN during previous hospital stay.   -  albumin remains decreased but appetite slowly improving per pt.   - Dietary consulted. Will need to closely monitor PO intake.   - supplements also ordered.   - SHANE with tube feeding start 1/16. Stopped 1/17/18, was not tolerating. Shane removed 1/20.  - prealbumin 7 on 1/15.  - TPN started 1/16/18.   - Pt able to eat minimally 1/21 and 1/22 which is an improvement.  - GI consulted - EGD reviewed, increased ppi bid, miralax bid, and metamucil bid, decreased narcotics.  - continue reglan, placed SHANE tube with tube feeds - transitioned to nightly feeds. Not tolerating well due to nausea.   - small bowel follow through with normal findings.   - consulted gi for possible gj tube placement, holding off on this for now as with ongoing fevers.  - Dietary changed tube feeds back to 24 hours from nightly 2/19 as not eating enough during the day to meet caloric needs. TF on hold since 2/20.  - retrial marinol 2/22 as pt reports that's the only thing helping his appetite, in the evening 2/2 concern for possible drowsiness  - Pt then developed persistent N/V again and TPN started 2/24. Cont TPN for now.   - Pt able to eat 1/4 sandwich without vomiting 2/25.  Not been able to eat anything more than that in 48 hours per pt and mother.    - EGD 3/6 with normal esophagus    - congested, erythematous and nodular mucosa in the stomach.    - biliary stent in the duodenum    - one duodenal ulcer with a clean ulcer base (Emir Class III).    - biopsies taken in the duodenal bulb and second and third portion of the duodenum.  - hold reglan for neuro symptoms.        Nausea and vomiting    - h/o constipation, reports having regular BM's with bowel regimen.  - vomiting episode x 1 at home. Anti-emetics ordered PRN.  - KUB repeated 1/14/18 given increased abdominal pain- mild, generalized bowel distention suggesting ileus.  Diet changed to clears as tolerated for bowel rest .  - SHANE tube placed 1/16 for tube feeds. Not tolerating tube  feeds, stopped 1/17/18.  - D/C dylan 1/19, now in place as of 1/25.  - chronic constipation, Cont bowel regimen.   - See protein calorie malnutrition.  - TF have been on hold since 2/20 for N/V. dylan d/c'd 2/23 as not tolerating TF  - 2/24 changes: Increased Reglan to 10 mg, D/C atovaquone, transitioned cyclo to Prograf, change twyla to BID, check Keppra level  - GI consulted. EGD 3/6 as with continued nausea/vomiting with po intake. Appreciate GI recs.    - Cont to have nausea and vomiting w medications and eating.  Cont TPN.     - holding reglan 3/8 due to neurologic symptoms.            Recurrent pleural effusion on right    - CXR in ER with large right pleural effusion with subjective c/o worsening SOB.  - thoracentesis completed- 1200 ml removed.  SOB with significant improvement.   - Cell count reviewed and negative for infection.   - resp even and non labored.  O2 sat 94-98%.  - Repeat thora 1/19 negative for infection per cell count.  - Reaccumulating fluid on xray 1/21.  - thoracentesis 1/31 - 1.4L removed, negative for infection.  - Reaccumulating fluid again on xray 2/10.   - Thoracentesis performed  2/16 with 1500 cc off, cell count neg for infection, cx no growth to date.   - CT A/P 2/26 with large R pleural effusion, may need repeat thora if fevers reoccur.          Biliary stricture of transplanted liver    - tbili with increase prompting ERCP 1/30 with stones and sludge, stents placed.  - Repeat ERCP 2/19 as was having recurrent fevers with bilirubin stuck at 2.0-2.2 with overall unremarkable findings seen. Did place new larger stents. Bilirubin was improving. Now trending up daily again. Needed repeat ERCP in 10 weeks.   - plan for EUS biopsy and ERCP tomorrow 3/9. NPO after midnight.  - US 3/2 stent in CBD.        Acute renal failure with tubular necrosis    - HD resumed on previous admission. Now anuric and dialyzes M-W-F.  - Nephrology following.    - Ktm consulted for possible kidney transplant.             VTE Risk Mitigation         Ordered     heparin (porcine) injection 5,000 Units  Every 8 hours     Route:  Subcutaneous        03/08/18 1157     heparin (porcine) injection 1,000 Units  As needed (PRN)     Route:  Intra-Catheter        02/05/18 0945     Medium Risk of VTE  Once      01/11/18 0351     Place sequential compression device  Until discontinued      01/11/18 0351          The patients clinical status was discussed at multidisplinary rounds, involving transplant surgery, transplant medicine, pharmacy, nursing, nutrition, and social work    Discharge Planning: not a candidate for dc at this time.  Monitor kidney function --> may need outpt HD  Monitor HH needs vs rehab for deconditioned status      Mandy Crespo NP  Liver Transplant  [facility]

## 2018-03-08 NOTE — ASSESSMENT & PLAN NOTE
- h/o constipation, reports having regular BM's with bowel regimen.  - vomiting episode x 1 at home. Anti-emetics ordered PRN.  - KUB repeated 1/14/18 given increased abdominal pain- mild, generalized bowel distention suggesting ileus.  Diet changed to clears as tolerated for bowel rest .  - SHANE tube placed 1/16 for tube feeds. Not tolerating tube feeds, stopped 1/17/18.  - D/C shane 1/19, now in place as of 1/25.  - chronic constipation, Cont bowel regimen.   - See protein calorie malnutrition.  - TF have been on hold since 2/20 for N/V. shane d/c'd 2/23 as not tolerating TF  - 2/24 changes: Increased Reglan to 10 mg, D/C atovaquone, transitioned cyclo to Prograf, change twyla to BID, check Keppra level  - GI consulted. EGD 3/6 as with continued nausea/vomiting with po intake. Appreciate GI recs.    - Cont to have nausea and vomiting w medications and eating.  Cont TPN.     - holding reglan 3/8 due to neurologic symptoms.

## 2018-03-09 LAB
ALBUMIN SERPL BCP-MCNC: 1.8 G/DL
ALP SERPL-CCNC: 142 U/L
ALT SERPL W/O P-5'-P-CCNC: 8 U/L
ANION GAP SERPL CALC-SCNC: 11 MMOL/L
AST SERPL-CCNC: 41 U/L
BACTERIA FLD AEROBE CULT: NO GROWTH
BASOPHILS # BLD AUTO: 0.03 K/UL
BASOPHILS NFR BLD: 0.4 %
BILIRUB SERPL-MCNC: 2 MG/DL
BUN SERPL-MCNC: 57 MG/DL
CALCIUM SERPL-MCNC: 9.4 MG/DL
CHLORIDE SERPL-SCNC: 99 MMOL/L
CMV DNA SERPL NAA+PROBE-ACNC: NORMAL IU/ML
CO2 SERPL-SCNC: 26 MMOL/L
CREAT SERPL-MCNC: 3.9 MG/DL
CYCLOSPORINE BLD LC/MS/MS-MCNC: 181 NG/ML
DIFFERENTIAL METHOD: ABNORMAL
EOSINOPHIL # BLD AUTO: 0 K/UL
EOSINOPHIL NFR BLD: 0 %
ERYTHROCYTE [DISTWIDTH] IN BLOOD BY AUTOMATED COUNT: 17.5 %
EST. GFR  (AFRICAN AMERICAN): 22.7 ML/MIN/1.73 M^2
EST. GFR  (NON AFRICAN AMERICAN): 19.7 ML/MIN/1.73 M^2
GLUCOSE SERPL-MCNC: 85 MG/DL
GRAM STN SPEC: NORMAL
GRAM STN SPEC: NORMAL
HCT VFR BLD AUTO: 22.2 %
HGB BLD-MCNC: 7.5 G/DL
IMM GRANULOCYTES # BLD AUTO: 0.28 K/UL
IMM GRANULOCYTES NFR BLD AUTO: 3.4 %
LYMPHOCYTES # BLD AUTO: 1 K/UL
LYMPHOCYTES NFR BLD: 12.5 %
MAGNESIUM SERPL-MCNC: 1.9 MG/DL
MCH RBC QN AUTO: 32.1 PG
MCHC RBC AUTO-ENTMCNC: 33.8 G/DL
MCV RBC AUTO: 95 FL
MONOCYTES # BLD AUTO: 0.9 K/UL
MONOCYTES NFR BLD: 10.9 %
NEUTROPHILS # BLD AUTO: 5.9 K/UL
NEUTROPHILS NFR BLD: 72.8 %
NRBC BLD-RTO: 0 /100 WBC
PHOSPHATE SERPL-MCNC: 3.2 MG/DL
PLATELET # BLD AUTO: 183 K/UL
PMV BLD AUTO: 11.1 FL
POCT GLUCOSE: 94 MG/DL (ref 70–110)
POTASSIUM SERPL-SCNC: 3.5 MMOL/L
PROT SERPL-MCNC: 5.3 G/DL
RBC # BLD AUTO: 2.34 M/UL
SODIUM SERPL-SCNC: 136 MMOL/L
WBC # BLD AUTO: 8.13 K/UL

## 2018-03-09 PROCEDURE — 85025 COMPLETE CBC W/AUTO DIFF WBC: CPT

## 2018-03-09 PROCEDURE — 37000009 HC ANESTHESIA EA ADD 15 MINS: Performed by: INTERNAL MEDICINE

## 2018-03-09 PROCEDURE — 43274 ERCP DUCT STENT PLACEMENT: CPT | Mod: 59,,, | Performed by: INTERNAL MEDICINE

## 2018-03-09 PROCEDURE — 63600175 PHARM REV CODE 636 W HCPCS: Mod: JG | Performed by: NURSE PRACTITIONER

## 2018-03-09 PROCEDURE — 43242 EGD US FINE NEEDLE BX/ASPIR: CPT | Mod: 59,,, | Performed by: INTERNAL MEDICINE

## 2018-03-09 PROCEDURE — 27202304 HC CANNULA, ERCP: Performed by: INTERNAL MEDICINE

## 2018-03-09 PROCEDURE — 88313 SPECIAL STAINS GROUP 2: CPT | Mod: 26,,, | Performed by: PATHOLOGY

## 2018-03-09 PROCEDURE — 37000008 HC ANESTHESIA 1ST 15 MINUTES: Performed by: INTERNAL MEDICINE

## 2018-03-09 PROCEDURE — C1769 GUIDE WIRE: HCPCS | Performed by: INTERNAL MEDICINE

## 2018-03-09 PROCEDURE — D9220A PRA ANESTHESIA: Mod: NTX,,, | Performed by: ANESTHESIOLOGY

## 2018-03-09 PROCEDURE — 63600175 PHARM REV CODE 636 W HCPCS: Performed by: NURSE PRACTITIONER

## 2018-03-09 PROCEDURE — 25000003 PHARM REV CODE 250: Performed by: NURSE PRACTITIONER

## 2018-03-09 PROCEDURE — 27202125 HC BALLOON, EXTRACTION (ANY): Performed by: INTERNAL MEDICINE

## 2018-03-09 PROCEDURE — 25000003 PHARM REV CODE 250: Performed by: INTERNAL MEDICINE

## 2018-03-09 PROCEDURE — 25000003 PHARM REV CODE 250: Mod: NTX | Performed by: NURSE ANESTHETIST, CERTIFIED REGISTERED

## 2018-03-09 PROCEDURE — 43274 ERCP DUCT STENT PLACEMENT: CPT | Performed by: INTERNAL MEDICINE

## 2018-03-09 PROCEDURE — 90935 HEMODIALYSIS ONE EVALUATION: CPT

## 2018-03-09 PROCEDURE — 74328 X-RAY BILE DUCT ENDOSCOPY: CPT | Performed by: INTERNAL MEDICINE

## 2018-03-09 PROCEDURE — 25000003 PHARM REV CODE 250: Performed by: STUDENT IN AN ORGANIZED HEALTH CARE EDUCATION/TRAINING PROGRAM

## 2018-03-09 PROCEDURE — 27202059 HC NEEDLE, FNA (ANY): Performed by: INTERNAL MEDICINE

## 2018-03-09 PROCEDURE — S0030 INJECTION, METRONIDAZOLE: HCPCS | Performed by: NURSE PRACTITIONER

## 2018-03-09 PROCEDURE — 88307 TISSUE EXAM BY PATHOLOGIST: CPT | Mod: 26,,, | Performed by: PATHOLOGY

## 2018-03-09 PROCEDURE — B4185 PARENTERAL SOL 10 GM LIPIDS: HCPCS | Performed by: NURSE PRACTITIONER

## 2018-03-09 PROCEDURE — 25000003 PHARM REV CODE 250: Performed by: SURGERY

## 2018-03-09 PROCEDURE — 43242 EGD US FINE NEEDLE BX/ASPIR: CPT | Performed by: INTERNAL MEDICINE

## 2018-03-09 PROCEDURE — 83735 ASSAY OF MAGNESIUM: CPT

## 2018-03-09 PROCEDURE — 20600001 HC STEP DOWN PRIVATE ROOM

## 2018-03-09 PROCEDURE — 63600175 PHARM REV CODE 636 W HCPCS: Mod: NTX | Performed by: NURSE ANESTHETIST, CERTIFIED REGISTERED

## 2018-03-09 PROCEDURE — 80158 DRUG ASSAY CYCLOSPORINE: CPT

## 2018-03-09 PROCEDURE — 43264 ERCP REMOVE DUCT CALCULI: CPT | Mod: ,,, | Performed by: INTERNAL MEDICINE

## 2018-03-09 PROCEDURE — 63600175 PHARM REV CODE 636 W HCPCS: Performed by: SURGERY

## 2018-03-09 PROCEDURE — 43264 ERCP REMOVE DUCT CALCULI: CPT | Performed by: INTERNAL MEDICINE

## 2018-03-09 PROCEDURE — 43276 ERCP STENT EXCHANGE W/DILATE: CPT | Mod: ,,, | Performed by: INTERNAL MEDICINE

## 2018-03-09 PROCEDURE — 43276 ERCP STENT EXCHANGE W/DILATE: CPT | Performed by: INTERNAL MEDICINE

## 2018-03-09 PROCEDURE — 25000003 PHARM REV CODE 250: Mod: NTX | Performed by: INTERNAL MEDICINE

## 2018-03-09 PROCEDURE — 27201012 HC FORCEPS, HOT/COLD, DISP: Performed by: INTERNAL MEDICINE

## 2018-03-09 PROCEDURE — 88342 IMHCHEM/IMCYTCHM 1ST ANTB: CPT | Performed by: PATHOLOGY

## 2018-03-09 PROCEDURE — 74328 X-RAY BILE DUCT ENDOSCOPY: CPT | Mod: 26,,, | Performed by: INTERNAL MEDICINE

## 2018-03-09 PROCEDURE — C1874 STENT, COATED/COV W/DEL SYS: HCPCS | Performed by: INTERNAL MEDICINE

## 2018-03-09 PROCEDURE — 84100 ASSAY OF PHOSPHORUS: CPT

## 2018-03-09 PROCEDURE — 36415 COLL VENOUS BLD VENIPUNCTURE: CPT

## 2018-03-09 PROCEDURE — 99233 SBSQ HOSP IP/OBS HIGH 50: CPT | Mod: ,,, | Performed by: INTERNAL MEDICINE

## 2018-03-09 PROCEDURE — 99233 SBSQ HOSP IP/OBS HIGH 50: CPT | Mod: ,,, | Performed by: NURSE PRACTITIONER

## 2018-03-09 PROCEDURE — A4217 STERILE WATER/SALINE, 500 ML: HCPCS | Performed by: NURSE PRACTITIONER

## 2018-03-09 PROCEDURE — 80053 COMPREHEN METABOLIC PANEL: CPT

## 2018-03-09 PROCEDURE — 90935 HEMODIALYSIS ONE EVALUATION: CPT | Mod: ,,, | Performed by: INTERNAL MEDICINE

## 2018-03-09 PROCEDURE — 63600175 PHARM REV CODE 636 W HCPCS: Performed by: STUDENT IN AN ORGANIZED HEALTH CARE EDUCATION/TRAINING PROGRAM

## 2018-03-09 RX ORDER — FENTANYL CITRATE 50 UG/ML
INJECTION, SOLUTION INTRAMUSCULAR; INTRAVENOUS
Status: DISCONTINUED | OUTPATIENT
Start: 2018-03-09 | End: 2018-03-09

## 2018-03-09 RX ORDER — HYDROMORPHONE HYDROCHLORIDE 1 MG/ML
0.2 INJECTION, SOLUTION INTRAMUSCULAR; INTRAVENOUS; SUBCUTANEOUS EVERY 5 MIN PRN
Status: DISCONTINUED | OUTPATIENT
Start: 2018-03-09 | End: 2018-03-09 | Stop reason: HOSPADM

## 2018-03-09 RX ORDER — ACETAMINOPHEN 10 MG/ML
1000 INJECTION, SOLUTION INTRAVENOUS ONCE
Status: COMPLETED | OUTPATIENT
Start: 2018-03-09 | End: 2018-03-09

## 2018-03-09 RX ORDER — CEFEPIME HYDROCHLORIDE 1 G/1
1 INJECTION, POWDER, FOR SOLUTION INTRAMUSCULAR; INTRAVENOUS
Status: DISCONTINUED | OUTPATIENT
Start: 2018-03-09 | End: 2018-03-20

## 2018-03-09 RX ORDER — SODIUM CHLORIDE 0.9 % (FLUSH) 0.9 %
3 SYRINGE (ML) INJECTION
Status: DISCONTINUED | OUTPATIENT
Start: 2018-03-09 | End: 2018-03-09 | Stop reason: HOSPADM

## 2018-03-09 RX ORDER — METRONIDAZOLE 500 MG/100ML
INJECTION, SOLUTION INTRAVENOUS
Status: DISPENSED
Start: 2018-03-09 | End: 2018-03-09

## 2018-03-09 RX ORDER — SODIUM CHLORIDE 9 MG/ML
INJECTION, SOLUTION INTRAVENOUS CONTINUOUS
Status: DISCONTINUED | OUTPATIENT
Start: 2018-03-09 | End: 2018-03-09

## 2018-03-09 RX ORDER — SODIUM CHLORIDE 0.9 % (FLUSH) 0.9 %
3 SYRINGE (ML) INJECTION
Status: DISCONTINUED | OUTPATIENT
Start: 2018-03-09 | End: 2018-04-10 | Stop reason: HOSPADM

## 2018-03-09 RX ORDER — MIDAZOLAM HYDROCHLORIDE 1 MG/ML
INJECTION, SOLUTION INTRAMUSCULAR; INTRAVENOUS
Status: DISCONTINUED | OUTPATIENT
Start: 2018-03-09 | End: 2018-03-09

## 2018-03-09 RX ORDER — ONDANSETRON 2 MG/ML
8 INJECTION INTRAMUSCULAR; INTRAVENOUS ONCE
Status: COMPLETED | OUTPATIENT
Start: 2018-03-09 | End: 2018-03-09

## 2018-03-09 RX ORDER — ONDANSETRON 2 MG/ML
4 INJECTION INTRAMUSCULAR; INTRAVENOUS ONCE AS NEEDED
Status: DISCONTINUED | OUTPATIENT
Start: 2018-03-09 | End: 2018-03-09 | Stop reason: HOSPADM

## 2018-03-09 RX ORDER — CEFEPIME HYDROCHLORIDE 1 G/1
INJECTION, POWDER, FOR SOLUTION INTRAMUSCULAR; INTRAVENOUS
Status: DISPENSED
Start: 2018-03-09 | End: 2018-03-09

## 2018-03-09 RX ORDER — ETOMIDATE 2 MG/ML
INJECTION INTRAVENOUS
Status: DISCONTINUED | OUTPATIENT
Start: 2018-03-09 | End: 2018-03-09

## 2018-03-09 RX ORDER — SODIUM CHLORIDE 9 MG/ML
INJECTION, SOLUTION INTRAVENOUS
Status: DISCONTINUED | OUTPATIENT
Start: 2018-03-09 | End: 2018-03-18

## 2018-03-09 RX ORDER — LEVOTHYROXINE SODIUM 75 UG/1
TABLET ORAL
Status: DISPENSED
Start: 2018-03-09 | End: 2018-03-09

## 2018-03-09 RX ORDER — SUCCINYLCHOLINE CHLORIDE 20 MG/ML
INJECTION INTRAMUSCULAR; INTRAVENOUS
Status: DISCONTINUED | OUTPATIENT
Start: 2018-03-09 | End: 2018-03-09

## 2018-03-09 RX ADMIN — ERYTHROPOIETIN 10000 UNITS: 10000 INJECTION, SOLUTION INTRAVENOUS; SUBCUTANEOUS at 09:03

## 2018-03-09 RX ADMIN — FENTANYL CITRATE 50 MCG: 50 INJECTION, SOLUTION INTRAMUSCULAR; INTRAVENOUS at 01:03

## 2018-03-09 RX ADMIN — ACETAMINOPHEN 650 MG: 325 TABLET, FILM COATED ORAL at 12:03

## 2018-03-09 RX ADMIN — SODIUM CHLORIDE: 234 INJECTION INTRAMUSCULAR; INTRAVENOUS; SUBCUTANEOUS at 10:03

## 2018-03-09 RX ADMIN — SODIUM CHLORIDE: 9 INJECTION, SOLUTION INTRAVENOUS at 02:03

## 2018-03-09 RX ADMIN — HEPARIN SODIUM 1000 UNITS: 1000 INJECTION, SOLUTION INTRAVENOUS; SUBCUTANEOUS at 11:03

## 2018-03-09 RX ADMIN — METRONIDAZOLE 500 MG: 500 INJECTION, SOLUTION INTRAVENOUS at 04:03

## 2018-03-09 RX ADMIN — ONDANSETRON HYDROCHLORIDE 4 MG: 2 INJECTION, SOLUTION INTRAMUSCULAR; INTRAVENOUS at 04:03

## 2018-03-09 RX ADMIN — LEVOTHYROXINE SODIUM 75 MCG: 75 TABLET ORAL at 05:03

## 2018-03-09 RX ADMIN — MIDAZOLAM HYDROCHLORIDE 2 MG: 1 INJECTION, SOLUTION INTRAMUSCULAR; INTRAVENOUS at 01:03

## 2018-03-09 RX ADMIN — SODIUM CHLORIDE: 0.9 INJECTION, SOLUTION INTRAVENOUS at 01:03

## 2018-03-09 RX ADMIN — METRONIDAZOLE 500 MG: 500 INJECTION, SOLUTION INTRAVENOUS at 12:03

## 2018-03-09 RX ADMIN — PROMETHAZINE HYDROCHLORIDE 12.5 MG: 25 INJECTION INTRAMUSCULAR; INTRAVENOUS at 09:03

## 2018-03-09 RX ADMIN — PROCHLORPERAZINE EDISYLATE 10 MG: 5 INJECTION INTRAMUSCULAR; INTRAVENOUS at 03:03

## 2018-03-09 RX ADMIN — SODIUM CHLORIDE: 0.9 INJECTION, SOLUTION INTRAVENOUS at 08:03

## 2018-03-09 RX ADMIN — SUCCINYLCHOLINE CHLORIDE 100 MG: 20 INJECTION, SOLUTION INTRAMUSCULAR; INTRAVENOUS at 01:03

## 2018-03-09 RX ADMIN — ETOMIDATE 12 MG: 2 INJECTION, SOLUTION INTRAVENOUS at 01:03

## 2018-03-09 RX ADMIN — SOYBEAN OIL 250 ML: 20 INJECTION, SOLUTION INTRAVENOUS at 10:03

## 2018-03-09 RX ADMIN — CEFEPIME 1 G: 1 INJECTION, POWDER, FOR SOLUTION INTRAMUSCULAR; INTRAVENOUS at 05:03

## 2018-03-09 RX ADMIN — CYCLOSPORINE 125 MG: 100 CAPSULE, LIQUID FILLED ORAL at 05:03

## 2018-03-09 RX ADMIN — ACETAMINOPHEN 1000 MG: 10 INJECTION, SOLUTION INTRAVENOUS at 06:03

## 2018-03-09 RX ADMIN — CEFEPIME 1 G: 1 INJECTION, POWDER, FOR SOLUTION INTRAMUSCULAR; INTRAVENOUS at 04:03

## 2018-03-09 RX ADMIN — LEVETIRACETAM 500 MG: 100 SOLUTION ORAL at 04:03

## 2018-03-09 RX ADMIN — ONDANSETRON HYDROCHLORIDE 8 MG: 2 INJECTION, SOLUTION INTRAMUSCULAR; INTRAVENOUS at 08:03

## 2018-03-09 RX ADMIN — OMEPRAZOLE 40 MG: 40 CAPSULE, DELAYED RELEASE ORAL at 05:03

## 2018-03-09 NOTE — ASSESSMENT & PLAN NOTE
- Post op course complicated by fevers and hyperbilirubinemia.  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  - Liver US 1/8 showed 3.9 cm complex fluid collection anterior to right lobe and moderate nonspecific complex ascites inferior to transplant.   - IR placed drain 1/11/18, cell count negative for infection.  - PBS consulted. ERCP 1/17 with stone and sludge. Pt placed on Actigall - treated with 10 day course of abx.   - Bili elevated - liver u/s 1/28.  - Liver biopsy 1/23 without rejection.  - consulted PBS for ERCP - Choledocholithiasis was found along with sludge, stents placed 1/30.   - ERCP 2/19, overall unremarkable findings. Did place new larger stents. Will need repeat ERCP in 10 weeks. Bilirubin improving.  - Liver US reviewed w mod ascites.  Stent in CBD.    - Paracentesis 3/6 negative for infection, wbc 28 segs 3%.  - ERCP, EUS biopsy today - pending. T bili trending up daily, 2.1 today.

## 2018-03-09 NOTE — PROGRESS NOTES
Still out of the room.   Mom reports he went to ERCP.   Will defer wound care to nursing staff today   Zoë Murphy RN CWON  [phone]

## 2018-03-09 NOTE — PROGRESS NOTES
HD completed 15 mins early due to system clotted, ran 2hrs and 45mins. Blood was returned and CVC heplocked per protocol. Ports clamped and capped. Report called to Khadijah Silvestre RN.

## 2018-03-09 NOTE — PROVATION PATIENT INSTRUCTIONS
Discharge Summary/Instructions after an Endoscopic Procedure  Patient Name: Jhonny Diana  Patient MRN: [id number]  Patient YOB: 1990 Friday, March 09, 2018  Lara Nugent MD  RESTRICTIONS:  During your procedure today, you received medications for sedation.  These   medications may affect your judgment, balance and coordination.  Therefore,   for 24 hours, you have the following restrictions:   - DO NOT drive a car, operate machinery, make legal/financial decisions,   sign important papers or drink alcohol.    ACTIVITY:  The following day: return to full activity including work, except no heavy   lifting, straining or running for 3 days if polyps were removed.  DIET:  Eat and drink normally unless instructed otherwise.     TREATMENT FOR COMMON SIDE EFFECTS:  - Mild abdominal pain, nausea, belching, bloating or excessive gas:  rest,   eat lightly and use a heating pad.  - Sore Throat: treat with throat lozenges and/or gargle with warm salt   water.  - Because air was used during the procedure, expelling large amounts of air   from your rectum or belching is normal.  - If a bowel prep was taken, you may not have a bowel movement for 1-3 days.    This is normal.  SYMPTOMS TO WATCH FOR AND REPORT TO YOUR PHYSICIAN:  1. Abdominal pain or bloating, other than gas cramps.  2. Chest pain.  3. Back pain.  4. Signs of infection such as: chills or fever occurring within 24 hours   after the procedure.  5. Rectal bleeding, which would show as bright red, maroon, or black stools.   (A tablespoon of blood from the rectum is not serious, especially if   hemorrhoids are present.)  6. Vomiting.  7. Weakness or dizziness.  GO DIRECTLY TO THE NEAREST EMERGENCY ROOM IF YOU HAVE ANY OF THE FOLLOWING:      Difficulty breathing  Chills and/or fever over 101 F   Persistent vomiting and/or vomiting blood   Severe abdominal pain   Severe chest pain   Black, tarry stools   Bleeding- more than one tablespoon   Any other  symptom or condition that you feel may need urgent attention  Your doctor recommends these additional instructions:  If any biopsies were taken, your doctors clinic will contact you in 1 to 2   weeks with any results.  Resume your previous diet.   Continue your present medications.   Return to your referring physician.   We are waiting for pathology results.  For questions, problems or results please call your physician - Lara Nugent MD at Work:  [phone].  [facility], EMERGENCY ROOM PHONE NUMBER: [phone]  IF A COMPLICATION OR EMERGENCY SITUATION ARISES AND YOU ARE UNABLE TO REACH   YOUR PHYSICIAN - GO DIRECTLY TO THE EMERGENCY ROOM.  Lara Nugent MD  3/9/2018 3:01:50 PM  This report has been verified and signed electronically.

## 2018-03-09 NOTE — PROVATION PATIENT INSTRUCTIONS
Discharge Summary/Instructions after an Endoscopic Procedure  Patient Name: Jhonny Diana  Patient MRN: [id number]  Patient YOB: 1990 Friday, March 09, 2018  Lara Nugent MD  RESTRICTIONS:  During your procedure today, you received medications for sedation.  These   medications may affect your judgment, balance and coordination.  Therefore,   for 24 hours, you have the following restrictions:   - DO NOT drive a car, operate machinery, make legal/financial decisions,   sign important papers or drink alcohol.    ACTIVITY:  The following day: return to full activity including work, except no heavy   lifting, straining or running for 3 days if polyps were removed.  DIET:  Eat and drink normally unless instructed otherwise.     TREATMENT FOR COMMON SIDE EFFECTS:  - Mild abdominal pain, nausea, belching, bloating or excessive gas:  rest,   eat lightly and use a heating pad.  - Sore Throat: treat with throat lozenges and/or gargle with warm salt   water.  - Because air was used during the procedure, expelling large amounts of air   from your rectum or belching is normal.  - If a bowel prep was taken, you may not have a bowel movement for 1-3 days.    This is normal.  SYMPTOMS TO WATCH FOR AND REPORT TO YOUR PHYSICIAN:  1. Abdominal pain or bloating, other than gas cramps.  2. Chest pain.  3. Back pain.  4. Signs of infection such as: chills or fever occurring within 24 hours   after the procedure.  5. Rectal bleeding, which would show as bright red, maroon, or black stools.   (A tablespoon of blood from the rectum is not serious, especially if   hemorrhoids are present.)  6. Vomiting.  7. Weakness or dizziness.  GO DIRECTLY TO THE NEAREST EMERGENCY ROOM IF YOU HAVE ANY OF THE FOLLOWING:      Difficulty breathing  Chills and/or fever over 101 F   Persistent vomiting and/or vomiting blood   Severe abdominal pain   Severe chest pain   Black, tarry stools   Bleeding- more than one tablespoon   Any other  symptom or condition that you feel may need urgent attention  Your doctor recommends these additional instructions:  If any biopsies were taken, your doctors clinic will contact you in 1 to 2   weeks with any results.  Resume your previous diet.   Continue your present medications.   Your physician has recommended a repeat ERCP in four months to remove   stent.  For questions, problems or results please call your physician - Lara Nugent MD at Work:  [phone].  [facility], EMERGENCY ROOM PHONE NUMBER: [phone]  IF A COMPLICATION OR EMERGENCY SITUATION ARISES AND YOU ARE UNABLE TO REACH   YOUR PHYSICIAN - GO DIRECTLY TO THE EMERGENCY ROOM.  Lara Nugent MD  3/9/2018 3:11:13 PM  This report has been verified and signed electronically.

## 2018-03-09 NOTE — PROGRESS NOTES
Pt off of the floor in hemodialysis.  Will reschedule to see for wound follow up this afternoon.   Zoë Murphy RN CWON  [phone]

## 2018-03-09 NOTE — PROGRESS NOTES
[facility]  Nephrology  Progress Note    Patient Name: Jhonny Diana  MRN: [id number]  Admission Date: 1/11/2018  Hospital Length of Stay: 57 days  Attending Provider: Nathanael Medina MD   Primary Care Physician: Primary Doctor No  Principal Problem:Fever    Subjective:     HPI: Jhonny Diana is a 26 y/o  male with PMHx relevant for ETOH cirrhosis.  S/p DDLT 10/19/2017; c/b seizures (swtiched off prograf to cyclo), ATN dialysis dependant in iHD MWF, last HD on 1/10, anuric, superficial wound infection s/p wound vac to chevron incision, and multiple admissions for fevers finally dx with peritonitis in November (WBC 5000, 75% PNM) neg for bile leak. HE gets recurrent paracentesis and has peritoneal drain. Other pertinent PMH current wound vac in place 2/2 wound infection, malnutrition requiring TPN for short course and ongoing hypoalbuminemia, and seizure activity while on prograf and has since been switched to cyclosporine without reoccurrence.  He is admitted to LTx service secondary to fever, abdominal pain, and N/V. He reports fever (103) for 1 day prior. Overnight, he developed N/V, reports small amount of green emesis with new left sided pain. He also endorses worsening SOB with exertion. He was scheduled as an outpatient for follow up paracentesis and IR drainage of fluid collection. CXR in ER shows large pleural effusion with subsegmental atelectasis. His ANC is 900. Broad spectrum antibiotics initiated in ED. Nephrology consulted for LAURA dialysis dependant co management. He dialyses at [facility] under the care of Dr. Peterson via IMAN rowe.      Interval History:   Patient evaluated at bedside in the NINO, no significant event overnight, hemodynamically stable.     Review of patient's allergies indicates:   Allergen Reactions    Bactrim [sulfamethoxazole-trimethoprim] Other (See Comments)     Mookie Trell Syndrome     Current Facility-Administered Medications   Medication Frequency     0.9%  NaCl infusion (for blood administration) Q24H PRN    0.9%  NaCl infusion PRN    acetaminophen tablet 650 mg Q6H PRN    albuterol-ipratropium 2.5mg-0.5mg/3mL nebulizer solution 3 mL Q4H PRN    bisacodyl EC tablet 10 mg Daily    bisacodyl suppository 10 mg Daily PRN    ceFEPIme (MAXIPIME) 1 gram injection     ceFEPIme injection 1 g Q12H    cycloSPORINE modified (NEORAL) 100 MG capsule     cycloSPORINE modified (NEORAL) 25 MG capsule     cycloSPORINE modified capsule 125 mg BID    dextrose 50% injection 12.5 g PRN    dextrose 50% injection 25 g PRN    diphenhydrAMINE-zinc acetate 1-0.1% cream TID PRN    docusate sodium capsule 100 mg BID    epoetin maurisio injection 10,000 Units Every Mon, Wed, Fri    ergocalciferol capsule 50,000 Units Q7 Days    fat emulsion 20% infusion 250 mL Once    glucagon (human recombinant) injection 1 mg PRN    glucose chewable tablet 16 g PRN    glucose chewable tablet 24 g PRN    heparin (porcine) injection 1,000 Units PRN    heparin (porcine) injection 5,000 Units Q8H    isavuconazonium sulfate 372 mg in sodium chloride 0.9% 250 mL Q24H    levetiracetam oral soln Soln 500 mg BID    levothyroxine (SYNTHROID) 75 MCG tablet     levothyroxine tablet 75 mcg Before breakfast    metronidazole (FLAGYL) 500 mg/100 mL IVPB     metronidazole IVPB 500 mg Q8H    multivitamin tablet 1 tablet Daily    naloxone 0.4 mg/mL injection 0.2 mg PRN    omeprazole capsule 40 mg QAM    ondansetron disintegrating tablet 8 mg Q6H PRN    ondansetron injection 4 mg Q6H PRN    oxyCODONE immediate release tablet 5 mg Q6H PRN    polyethylene glycol packet 17 g BID    prochlorperazine injection Soln 10 mg Q6H PRN    psyllium husk (aspartame) 3.4 gram PwPk 1 packet BID    simethicone chewable tablet 80 mg TID PRN    sodium chloride 0.9% flush 3 mL PRN    TPN ADULT CENTRAL LINE CUSTOM Continuous    TPN ADULT CENTRAL LINE CUSTOM Continuous    ursodiol oral suspension (conc: 60  mg/mL) 300 mg BID       Objective:     Vital Signs (Most Recent):  Temp: 98.4 °F (36.9 °C) (03/09/18 0745)  Pulse: 85 (03/09/18 0915)  Resp: 18 (03/09/18 0745)  BP: 134/69 (03/09/18 0915)  SpO2: (!) 94 % (03/09/18 0445)  O2 Device (Oxygen Therapy): room air (03/09/18 0745) Vital Signs (24h Range):  Temp:  [98.4 °F (36.9 °C)-101.1 °F (38.4 °C)] 98.4 °F (36.9 °C)  Pulse:  [84-95] 85  Resp:  [16-20] 18  SpO2:  [93 %-95 %] 94 %  BP: (127-139)/(69-89) 134/69     Weight: 74.2 kg (163 lb 9.3 oz) (03/09/18 0500)  Body mass index is 26.4 kg/m².  Body surface area is 1.86 meters squared.    I/O last 3 completed shifts:  In: 1723.9 [P.O.:235; IV Piggyback:450]  Out: 150 [Urine:150]    Physical Exam   Constitutional: He is oriented to person, place, and time.   HENT:   Mouth/Throat: No oropharyngeal exudate.   Eyes: No scleral icterus.   Cardiovascular: Normal rate and regular rhythm.    Pulmonary/Chest: Effort normal and breath sounds normal. No respiratory distress. He has no wheezes. He has no rales.   Abdominal: Soft. There is no rebound.   Musculoskeletal: He exhibits edema.   Lymphadenopathy:     He has no cervical adenopathy.   Neurological: He is alert and oriented to person, place, and time.   Skin: He is not diaphoretic.       Significant Labs:  ABGs: No results for input(s): PH, PCO2, HCO3, POCSATURATED, BE in the last 168 hours.  BMP:   Recent Labs  Lab 03/09/18  0500   GLU 85   CL 99   CO2 26   BUN 57*   CREATININE 3.9*   CALCIUM 9.4   MG 1.9     CBC:   Recent Labs  Lab 03/09/18  0500   WBC 8.13   RBC 2.34*   HGB 7.5*   HCT 22.2*      MCV 95   MCH 32.1*   MCHC 33.8     CMP:   Recent Labs  Lab 03/09/18  0500   GLU 85   CALCIUM 9.4   ALBUMIN 1.8*   PROT 5.3*      K 3.5   CO2 26   CL 99   BUN 57*   CREATININE 3.9*   ALKPHOS 142*   ALT 8*   AST 41*   BILITOT 2.0*     All labs within the past 24 hours have been reviewed.       Assessment/Plan:     Acute renal failure with tubular necrosis    LAURA dialysisi  dependant since 1016/2017  HD [facility] under the cre of Dr. Peterson  On HD for: 2.5 mo since 10/16/2017  Duration of outpatient dialysis session - 3.5 hrs  EDW - TBD  Residual Renal Function - yes     Plan:  - Will provide dialysis for metabolic clearance and volume management in am via RIJ-PermCath, duration 3.5 hrs, UF 2-3  L as tolerated by patient, maintain MAP>65, will adjust bath to chem.   - Blood pressures stable          Win Sandoval  Nephrology  Fellow  [facility]    Pager [phone]  I have reviewed and concur with the fellow's history, physical, assessment, and plan. I have personally interviewed and examined the patient at bedside.

## 2018-03-09 NOTE — PROGRESS NOTES
[facility]  Liver Transplant  Progress Note    Patient Name: Jhonny Diana  MRN: [id number]  Admission Date: 2018  Hospital Length of Stay: 58 days  Code Status: Full Code  Primary Care Provider: Primary Doctor No  Post-Operative Day: 141    ORGAN:   LIVER  Disease Etiology: Acute Alcoholic Hepatitis  Donor Type:    - Brain Death  CDC High Risk:   No  Donor CMV Status:   Donor CMV Status: Positive  Donor HBcAB:   Negative  Donor HCV Status:   Negative  Whole or Partial: Whole Liver  Biliary Anastomosis: End to End  Arterial Anatomy: Standard  Subjective:     History of Present Illness:  Jhonny Diana is a 26 y/o male with past medical history of alcoholic cirrhosis.  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 Found to have peritonitis in November (WBC 5000, 75% PNM) neg for bile leak. He was treated initially with vanc/cefepime. Repeat cell counts  with some improvement (WBC 1400, 45% PNM). He has undergone multiple paracenteses as well as abscess drainage of perihepatic fluid collections and treated with antimicrobial therapy but no positive cultures. Of note, biliary stricture also identified and ERCP performed on 2017 with sphincterotomy and biliary stent placed. Liver tests still have not normalized despite intervention, bilirubin and AP remain elevated. Other pertinent PMH current wound vac in place 2/2 wound infection, malnutrition requiring TPN for short course and ongoing hypoalbuminemia, and seizure activity while on prograf and has since been switched to cyclosporine without reoccurrence.  He presented to the ER for fever, abdominal pain, and N/V. He reports fever (103) for 1  day prior. Overnight, he developed N/V, reports small amount of green emesis with new left sided pain. He also endorses worsening SOB with exertion. He was scheduled as an outpatient for follow up paracentesis and IR drainage of fluid collection. CXR in ER shows large pleural effusion with subsegmental atelectasis. Infectious work up initiated in ER. His ANC is 900. Broad spectrum antibiotics initiated in ED. He denies chest pain, palpitations, diarrhea, constipation, or back pain. Denies any sick contacts.    Hospital Course:  Thoracentesis (1.2L off), Paracentesis (1.6L off), and IR drainage of fluid collection 1/11, all fluids negative for infection. ID consulted. Broad spectrum antibiotics d/c'd 1/15. Chronically malnourished with poor PO intake, prealbumin 7. Shane tube placed 1/15 for tube feedings.     ERCP 1/17 with sludge and a biliary stone which was removed and stent exchanged.  Remained with n/v; therefore, TF remained on hold and TPN continued.   Pt with fever s/p ERCP on 1/17 which continued until 1/19.  Vanc/cefepime restarted.  Blood cx 1/17 and 1/18 NGTD.  ID reconsulted.  Fungal markers sent.  CT C/A/P obtained.  With large R pleural effusion and ascites- both drained 1/19 and negative for infx per cell count. Pt afebrile 1/20. Liver biopsy 1/23 - without rejection.  EGD 1/25 - unremarkable for source of GI symptoms.  Resume diet along with other GI recommendations. SHANE tube placed for tube feeds.       2/22: Pt again spiked temp of 101 overnight, now down to 99. Resent Bcx overnight, prelim NGTD. Transitioned to Erta from vanc/zosyn per ID. Planning for tagged WBC scan on Monday. R sided abdominal erythema/dependent edema improving today.  TF have been on hold since 2/20. Will continue to hold today per pt request. Pt working on diet. KUB yesterday with significant stool and gas. Declines colace, miralax, metamucil, but willing to take daily bisacodyl. Pt agreeable to suppository today. If  suppository not successful can give mag citrate through dylan tonight. Will also retrial Marinol tonight as pt reported improvement in appetite previously. Will monitor closely for sedation.     2/23:  2 small BM 2/22.  Tolerated HD.  Wilson removed.  Will attempt to eat this PM.      2/24:  Pt ate then vomited hamburger 2/23.  Will resume TPN.  D/C atovaquone, transition cyclo to Prograf, check Keppra level, increase Reglan to 0 mg, change twyla to BID for nausea.  Will give pentam today.    2/25:  TPN going well, pt ate 1/2 subway sandwhich without vomiting yesterday, will continue to monitor.  Plan to repeat EGD this week.  Pt with drop in H/h again and appears to be hemolyzing.  LDH elevated, hapto slightly low, LEE pending.  With neutropenia- Neupogen given.    2/26:  Pt with severe back pain s/p HD. Unclear if secondary to neupogen.  Repeat labs stable. Indium scan scheduled for 2/27- injection done today (back pain started prior). CT A/P obtained with fluid collection seen in pelvis.    2/27: Patient spiking fevers, 101. Indium scan, negative results. CT A/P obtained 2/26 with focal/loculated region of fluid in the lower right abdomen measuring approximately 8.7 x 5.5 cm. IR attempted drainage, however, per IR,there was  limited fluid interposed with bowel in the RLQ with no safe pocket seen for drainage.    2/28: Remains with low grade fevers. Neupogen given.     3/1: Patient with stuttering speech, trouble recalling specific words when speaking. Does have hx of seizure (on keppra) previously while on prograf. Switched to prograf 2/24 from cyclosporine. Neuro exam unremarkable. CT head without contrast obtained, unremarkable. In light of these symptoms, prograf d/c, cyclosporine restarted 3/1. Per transplant attending, held off on EGD at this time as concern anesthesia would put patient at further increase risk for seizure activity.     3/4: pt walked in room yesterday.  Had 1 BM.  No appetite still.  Cont TPN.   He reports swelling and back pain today.  He vomited after taking Oxy PO.  Fentanyl 12.5mg IV ordered.  Patient rested well afterward.  Remains afebrile.   ID following.   Cont Ertapenem.  Repeat blood and urine cx given continued malaise and stutter- blood cx prelim NGTD.  UA ok.  Urine cx pending.    Tentative plan for EGD on Monday to assess source of fevers/nausea/abdominal pain w bx for CMV and GVHD.  Will also reassess rather able to perform paracentesis.     3/5: no acute events overnight. Pt still not feeling well and with poor sleep overnight. Cont to have mumbling of speech at times but slightly better than before. Remains with abdominal pain but relieved with PO pain meds. Tolerated HD well today. Will plan for paracentesis and EGD tomorrow to assess for peritonitis and cause of persistent N/V respectively.     3/6: pt continues to have garbled/mumbled speech. EGD performed today and pt received sedation during procedure. Increased lethargy and dysarthria noted post procedure. VS stable. Paracentesis scheduled today as well to r/o peritonitis. Wbc count stable. Afebrile. Continue Ertapenem. HD yesterday and pt tolerated well, 2.5 L removed. Monitor.     3/7: MRI brain yesterday unremarkable. Blood cultures ngtd so far. Neurology consulted. EGD and Paracentesis yesterday. Para with 25 cc removed, wbc 28 segs 3%. Continue TPN. Monitor.    3/8: EUS and ERCP scheduled tomorrow. NPO after midnight. T bili continues trending up daily. Last ERCP on 2/19 with two stents placed. Ertapenem dc'd 3/7. Pt spiking temps today, tmax 100.9. Cefepime/vanc started. Oxycodone decreased to 5 mg q6h prn. Dc marinol and reglan for now. He continues to slur/stutter and have jerky movements throughout the day. Neurology following. EEG scheduled today. Monitor.     3/9: EUS/ERCP completed with choledoliathiasis.  Fever over night.  May need LP in future if mental status does not improve.      Scheduled Meds:   bisacodyl  10 mg  Oral Daily    ceFEPIme        ceFEPime (MAXIPIME) IVPB  1 g Intravenous Q12H    cycloSPORINE modified        cycloSPORINE modified        cycloSPORINE modified  125 mg Oral BID    docusate sodium  100 mg Oral BID    epoetin maurisio (PROCRIT) injection  10,000 Units Intravenous Every Mon, Wed, Fri    ergocalciferol  50,000 Units Oral Q7 Days    fat emulsion 20%  250 mL Intravenous Once    heparin (porcine)  5,000 Units Subcutaneous Q8H    custom IVPB builder   Intravenous Q24H    levetiracetam oral soln  500 mg Oral BID    levothyroxine        levothyroxine  75 mcg Oral Before breakfast    metronidazole  500 mg Intravenous Q8H    multivitamin  1 tablet Oral Daily    omeprazole  40 mg Oral QAM    polyethylene glycol  17 g Oral BID    psyllium husk (aspartame)  3.4 g Oral BID    ursodiol  300 mg Oral BID     Continuous Infusions:   TPN ADULT CENTRAL LINE CUSTOM 48 mL/hr at 03/08/18 2253    TPN ADULT CENTRAL LINE CUSTOM       PRN Meds:sodium chloride, sodium chloride 0.9%, acetaminophen, albuterol-ipratropium 2.5mg-0.5mg/3mL, bisacodyl, dextrose 50%, dextrose 50%, diphenhydrAMINE-zinc acetate 1-0.1%, glucagon (human recombinant), glucose, glucose, naloxone, ondansetron, ondansetron, oxyCODONE, prochlorperazine, simethicone, sodium chloride 0.9%, sodium chloride 0.9%    Review of Systems   Unable to perform ROS: Mental status change     Objective:     Vital Signs (Most Recent):  Temp: 100 °F (37.8 °C) (03/09/18 1530)  Pulse: 102 (03/09/18 1530)  Resp: 18 (03/09/18 1534)  BP: 130/72 (03/09/18 1530)  SpO2: (!) 94 % (03/09/18 1530) Vital Signs (24h Range):  Temp:  [98.4 °F (36.9 °C)-101.1 °F (38.4 °C)] 100 °F (37.8 °C)  Pulse:  [] 102  Resp:  [16-20] 18  SpO2:  [93 %-99 %] 94 %  BP: (123-139)/(64-89) 130/72     Weight: 74.2 kg (163 lb 9.3 oz)  Body mass index is 26.4 kg/m².    Intake/Output - Last 3 Shifts       03/07 0700 - 03/08 0659 03/08 0700 - 03/09 0659 03/09 0700 - 03/10 0659    P.O. 245 149  0    I.V. (mL/kg)       Other 650  550    IV Piggyback  450     .7 439.2     Total Intake(mL/kg) 1830.7 (25) 999.2 (13.5) 550 (7.4)    Urine (mL/kg/hr) 0 (0) 150 (0.1)     Emesis/NG output 0 (0)      Other 3650 (2.1)  2850 (3.9)    Stool 0 (0)      Blood 0 (0)      Total Output 3650 150 2850    Net -1819.3 +849.2 -2300           Urine Occurrence 0 x      Stool Occurrence 1 x      Emesis Occurrence 0 x            Physical Exam   Constitutional: He appears well-developed. He appears lethargic.   HENT:   Head: Normocephalic.   Eyes: Pupils are equal, round, and reactive to light. Scleral icterus is present.   Cardiovascular: Normal rate, regular rhythm and intact distal pulses.    Pulmonary/Chest: Effort normal and breath sounds normal.   Abdominal: Bowel sounds are normal. He exhibits distension. There is tenderness.   Musculoskeletal: Normal range of motion. He exhibits edema.   Neurological: He appears lethargic. GCS eye subscore is 3. GCS verbal subscore is 3. GCS motor subscore is 5.   Skin: Skin is warm and dry.       Laboratory:  Immunosuppressants         Stop Route Frequency     cycloSPORINE modified (NEORAL) 25 MG capsule      03/09 1714       cycloSPORINE modified (NEORAL) 100 MG capsule      03/09 1714       cycloSPORINE modified capsule 125 mg      -- Oral 2 times daily     cycloSPORINE modified (NEORAL) 100 MG capsule      03/09 0514       cycloSPORINE modified (NEORAL) 25 MG capsule      03/09 0514          CBC:   Recent Labs  Lab 03/09/18  0500   WBC 8.13   RBC 2.34*   HGB 7.5*   HCT 22.2*      MCV 95   MCH 32.1*   MCHC 33.8     CMP:   Recent Labs  Lab 03/09/18  0500   GLU 85   CALCIUM 9.4   ALBUMIN 1.8*   PROT 5.3*      K 3.5   CO2 26   CL 99   BUN 57*   CREATININE 3.9*   ALKPHOS 142*   ALT 8*   AST 41*   BILITOT 2.0*     Coagulation: No results for input(s): PT, INR, APTT in the last 168 hours.  Labs within the past 24 hours have been reviewed.    Diagnostic  Results:  None    Assessment/Plan:     Fever    - Started on vanc/cefepime for ERCP. Now transitioned to vanc/zosyn.  - Blood cultures sent, prelim NGTD. Repeat blood cultures and chest xray today 2/10.  - thoracentesis 2/1 - fluid negative for infection.  - atelectasis noted on ct scan - start breathing tx & CPT.  - now cmv positive - started treatment 2/5/18.  - taken to OR 2/7 for exp lap - fluid collections drained, cultures pending.  - Patient with low grade fever 2/15, 100.3.  - CT A/P 2/15 with no evidence of IA fluid infection. Did show R pleural effusion s/p thoracentesis, cell count negative for infection, cx pending.  - Repeat blood cx 2/15 prelim NGTD, CMV PCR 2/15 negative   Complaining of abdominal pain. Is tender in RUQ.  - Remains with fevers.  - Bilirubin remains elevated. Concerning for cholangitis. Antibiotics broadened 2/17. ID re consulted.   - ERCP  2/19, overall unremarkable findings. Did place new larger stents. Will need repeat ERCP in 10 weeks. Bilirubin is stable post ERCP.  -Tmax 101 2/21 PM again ID following. Plan to transition vanc/zosyn to ertapenem 2/22 as no improvement noted.   - Erta started 2/22.  - CT A/P obtained 2/26 with focal/loculated region of fluid in the lower right abdomen measuring approximately 8.7 x 5.5 cm.   - IR attempted drainage on fluid collection on 2/27, however, per IR,there was limited fluid interposed with bowel in the RLQ with no safe pocket seen for drainage.  -Tagged WBC scan 2/27, negative results.  - Remains with low grade fevers.   - Per transplant attending, will hold off on EGD at this time as concern anesthesia will put patient at further increase risk for seizure activity. Will plan for potenial EGD on Monday. Discussed with GI, appreciate their assistance.  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 EBV PCR negative. Adenovirus pending. Repeat CMV PCR negative.    - Back pain and abdominal pain persists. Last BM 3/5 with brown bomb.   -  ID following. Cont Ertapenem dc'd 3/7 - due to AMS   - tmax 101.1 overnight 3/8 - Cefepime/vanc restarted for procedures. Monitor.           Severe protein-calorie malnutrition    - Poor PO intake since transplant requiring short course of TPN during previous hospital stay.   - albumin remains decreased but appetite slowly improving per pt.   - Dietary consulted. Will need to closely monitor PO intake.   - supplements also ordered.   - SHANE with tube feeding start 1/16. Stopped 1/17/18, was not tolerating. Shane removed 1/20.  - prealbumin 7 on 1/15.  - TPN started 1/16/18.   - Pt able to eat minimally 1/21 and 1/22 which is an improvement.  - GI consulted - EGD reviewed, increased ppi bid, miralax bid, and metamucil bid, decreased narcotics.  - continue reglan, placed SHANE tube with tube feeds - transitioned to nightly feeds. Not tolerating well due to nausea.   - small bowel follow through with normal findings.   - consulted gi for possible gj tube placement, holding off on this for now as with ongoing fevers.  - Dietary changed tube feeds back to 24 hours from nightly 2/19 as not eating enough during the day to meet caloric needs. TF on hold since 2/20.  - retrial marinol 2/22 as pt reports that's the only thing helping his appetite, in the evening 2/2 concern for possible drowsiness  - Pt then developed persistent N/V again and TPN started 2/24. Cont TPN for now.   - Pt able to eat 1/4 sandwich without vomiting 2/25.  Not been able to eat anything more than that in 48 hours per pt and mother.    - EGD 3/6 with normal esophagus    - congested, erythematous and nodular mucosa in the stomach.    - biliary stent in the duodenum    - one duodenal ulcer with a clean ulcer base (Emir Class III).    - biopsies taken in the duodenal bulb and second and third portion of the duodenum.  - hold reglan for neuro symptoms.        Acute renal failure with tubular necrosis    - HD resumed on previous admission. Now anuric and  dialyzes M-W-F.  - Nephrology following.    - Ktm consulted for possible kidney transplant.        Other cytomegaloviral diseases    - detected at 370 on CMV PCR 2/1.  - case discussed with ID and will hold off on treatment at this time given low WBC.   - undetected CMV PCR 2/8.  - continue Valcyte 200 mg every Mon, Wed, Fri.  - CMV PCR 2/15 negative.  - Per ID, valcyte dose decreased to ppx dose on 2/18.  - CMV PCR 2/22 negative.   - CMV PCR 3/8 pending.  - Repeat scope 3/6 to assess persistent N/V and possible CMV causing these symptoms. Biopsies pending.        Liver transplanted    - Post op course complicated by fevers and hyperbilirubinemia.  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  - Liver US 1/8 showed 3.9 cm complex fluid collection anterior to right lobe and moderate nonspecific complex ascites inferior to transplant.   - IR placed drain 1/11/18, cell count negative for infection.  - PBS consulted. ERCP 1/17 with stone and sludge. Pt placed on Actigall - treated with 10 day course of abx.   - Bili elevated - liver u/s 1/28.  - Liver biopsy 1/23 without rejection.  - consulted PBS for ERCP - Choledocholithiasis was found along with sludge, stents placed 1/30.   - ERCP 2/19, overall unremarkable findings. Did place new larger stents. Will need repeat ERCP in 10 weeks. Bilirubin improving.  - Liver US reviewed w mod ascites.  Stent in CBD.    - Paracentesis 3/6 negative for infection, wbc 28 segs 3%.  - ERCP, EUS biopsy today - pending. T bili trending up daily, 2.1 today.        Constipation    - Enema ordered 1/20 and 1/21 with BM.  - Encourage ambulation.  - Decrease narcotics.  - D/c metamucil and miralax bid as makes patient nauseated.  - Increase reglan qid.  - Small bowel follow through with normal findings.   - Schedule bisacodyl for bowel regimen.   - KUB 2/21 with significant amounts of stool and gas. Pt declines miralax, metamucil, colace.   - encouraged suppository.  -  Improved on KUB 2/23.  - Pt moving bowels now but will have to keep aggressive bowel regimen on board.   - Brown bomb 3/5 with BM x 1.  - KUB 3/8 with findings concerning for a partial bowel obstruction.        Long-term use of immunosuppressant medication    - Maintenance IS with cyclosporine --> transitioned to Prograf 2/24 for continued nausea. However now with neurological symptoms with prograf and hx of seizure, prograf d/c and cyclosporine restarted 3/1.  - MMF on hold for infections and neutropenia.   - Continue to check cyclosporine and Prograf level daily during transition. Assess for toxicity and adjust level as needed.        Prophylactic immunotherapy    - See long term use of immunosuppression.         Anemia of chronic disease    - H&H stable. Cont to monitor with daily cbc.         Recurrent pleural effusion on right    - CXR in ER with large right pleural effusion with subjective c/o worsening SOB.  - thoracentesis completed- 1200 ml removed.  SOB with significant improvement.   - Cell count reviewed and negative for infection.   - resp even and non labored.  O2 sat 94-98%.  - Repeat thora 1/19 negative for infection per cell count.  - Reaccumulating fluid on xray 1/21.  - thoracentesis 1/31 - 1.4L removed, negative for infection.  - Reaccumulating fluid again on xray 2/10.   - Thoracentesis performed  2/16 with 1500 cc off, cell count neg for infection, cx no growth to date.   - CT A/P 2/26 with large R pleural effusion, may need repeat thora if fevers reoccur.          Biliary stricture of transplanted liver    - tbili with increase prompting ERCP 1/30 with stones and sludge, stents placed.  - Repeat ERCP 2/19 as was having recurrent fevers with bilirubin stuck at 2.0-2.2 with overall unremarkable findings seen. Did place new larger stents. Bilirubin was improving. Now trending up daily again. Needed repeat ERCP in 10 weeks.   - plan for EUS biopsy and ERCP tomorrow 3/9. NPO after midnight.  - US  3/2 stent in CBD.        Seizure    - Patient with stuttering speech, trouble recalling specific words when speaking. Does have hx of seizure (on keppra) previously while on prograf. Switched to prograf over the weekend from cyclosporine. Neuro exam unremarkable.   - CT head without contrast obtained, unremarkable.   - In light of these symptoms, prograf d/c, restart cyclosporine 3/1.  - remains w/o seizure. Cont to have stutter/garbled speech/difficulty finding words.  Worse post EGD as sedation was given.  - MRI 3/6 unremarkable.  - EEG scheduled today 3/8.  - monitor closely. VSS.          At risk for opportunistic infections    - CMV detected 2/1 now on tx dose valcyte. CMV PCR 2/15 not detected. CMV PCR 2/22, negative.  - Repeat CMV PCR 3/1 negative.  CMV PCR pending today.  - Pentam given 2/24.            Delayed surgical wound healing    - wd vac removed 1/12/18.  Wound healing well. Aquacel AG and Mepilex border dressing applied via wound care recs.           Vitamin D deficiency    - start ergo 50K weekly.           Other neutropenia    - WBC stable today.   - Neupogen last given 3/1.           Other ascites    - Paracentesis performed 1/11/18 with 1600 ml removed.    - fluid negative for infection.   - Repeat para 1/19 negative for infection per cell count.  - no fluid seen for paracentesis on 1/23.  - Para 3/6 (diagnostic) negative for infection; wbc 28 segs 3%.        Nausea and vomiting    - h/o constipation, reports having regular BM's with bowel regimen.  - vomiting episode x 1 at home. Anti-emetics ordered PRN.  - KUB repeated 1/14/18 given increased abdominal pain- mild, generalized bowel distention suggesting ileus.  Diet changed to clears as tolerated for bowel rest .  - SHANE tube placed 1/16 for tube feeds. Not tolerating tube feeds, stopped 1/17/18.  - D/C shane 1/19, now in place as of 1/25.  - chronic constipation, Cont bowel regimen.   - See protein calorie malnutrition.  - TF have been on hold  since 2/20 for N/V. dylan d/c'd 2/23 as not tolerating TF  - 2/24 changes: Increased Reglan to 10 mg, D/C atovaquone, transitioned cyclo to Prograf, change twyla to BID, check Keppra level  - GI consulted. EGD 3/6 as with continued nausea/vomiting with po intake. Appreciate GI recs.    - Cont to have nausea and vomiting w medications and eating.  Cont TPN.     - holding reglan 3/8 due to neurologic symptoms.                VTE Risk Mitigation         Ordered     heparin (porcine) injection 5,000 Units  Every 8 hours     Route:  Subcutaneous        03/08/18 1622     Medium Risk of VTE  Once      01/11/18 0351     Place sequential compression device  Until discontinued      01/11/18 0351          The patients clinical status was discussed at multidisplinary rounds, involving transplant surgery, transplant medicine, pharmacy, nursing, nutrition, and social work    Discharge Planning:  Monitor kidney function --> may need outpt HD  Monitor HH needs vs rehab for deconditioned status      Seda Camacho, NP  Liver Transplant  [facility]

## 2018-03-09 NOTE — ANESTHESIA POSTPROCEDURE EVALUATION
"Anesthesia Post Evaluation    Patient: Jhonny Diana    Procedure(s) Performed: Procedure(s) (LRB):  ERCP (N/A)  ULTRASOUND-ENDOSCOPIC-UPPER (N/A)    Final Anesthesia Type: general  Patient location during evaluation: PACU  Patient participation: Yes- Able to Participate  Level of consciousness: awake and alert  Post-procedure vital signs: reviewed and stable  Pain management: adequate  Airway patency: patent  PONV status at discharge: No PONV  Anesthetic complications: no      Cardiovascular status: blood pressure returned to baseline and stable  Respiratory status: unassisted  Hydration status: euvolemic  Follow-up not needed.        Visit Vitals  /70   Pulse 103   Temp 37.3 °C (99.1 °F) (Axillary)   Resp 18   Ht 5' 6" (1.676 m)   Wt 74.2 kg (163 lb 9.3 oz)   SpO2 (!) 93%   BMI 26.40 kg/m²       Pain/Diogenes Score: Pain Assessment Performed: Yes (3/9/2018  2:50 PM)  Presence of Pain: denies (3/9/2018  2:50 PM)  Pain Rating Prior to Med Admin: 0 (3/9/2018 12:34 AM)  Pain Rating Post Med Admin: 3 (3/8/2018 11:00 PM)  Diogenes Score: 9 (3/9/2018  2:50 PM)      "

## 2018-03-09 NOTE — H&P
History & Physical - Short Stay  Gastroenterology      SUBJECTIVE:     Procedure: EUS and ERCP    Chief Complaint/Indication for Procedure: elevated liver tests    History of Present Illness:  Patient is a 27 y.o. male with elevated liver tests coming for ERCP and EUS-LB.    PTA Medications   Medication Sig    aspirin (ECOTRIN) 81 MG EC tablet Take 1 tablet (81 mg total) by mouth once daily.    atovaquone (MEPRON) 750 mg/5 mL Susp Take 10 mLs (1,500 mg total) by mouth once daily. Stop on 4/17/18    cycloSPORINE modified, NEORAL, 25 MG capsule Take 3 capsules (75 mg total) by mouth 2 (two) times daily.    docusate sodium (COLACE) 100 MG capsule Take 100 mg by mouth 3 (three) times daily as needed for Constipation.    ergocalciferol (ERGOCALCIFEROL) 50,000 unit Cap Take 1 capsule (50,000 Units total) by mouth every 7 days.    levETIRAcetam (KEPPRA) 500 MG Tab Take 1 tablet (500 mg total) by mouth 2 (two) times daily.    levothyroxine (SYNTHROID) 75 MCG tablet Take 1 tablet (75 mcg total) by mouth before breakfast.    mirtazapine (REMERON) 7.5 MG Tab Take 1 tablet (7.5 mg total) by mouth every evening.    multivitamin (THERAGRAN) tablet Take 1 tablet by mouth once daily.    ondansetron (ZOFRAN-ODT) 8 MG TbDL Take 1 tablet (8 mg total) by mouth every 8 (eight) hours as needed (nausea).    oxyCODONE (ROXICODONE) 10 mg Tab immediate release tablet Take 0.5-1 tablets (5-10 mg total) by mouth every 6 (six) hours as needed for Pain.    pantoprazole (PROTONIX) 40 MG tablet Take 1 tablet (40 mg total) by mouth once daily.    sodium bicarbonate 650 MG tablet Take 2 tablets (1,300 mg total) by mouth 2 (two) times daily.    thiamine 100 MG tablet Take 1 tablet (100 mg total) by mouth once daily.    ursodiol (ACTIGALL) 300 mg capsule Take 1 capsule (300 mg total) by mouth 2 (two) times daily.       Review of patient's allergies indicates:   Allergen Reactions    Bactrim [sulfamethoxazole-trimethoprim] Other (See  Comments)     Mookie Trell Syndrome        Past Medical History:   Diagnosis Date    Alcoholic hepatitis with ascites     Alcoholic hepatitis with ascites     History of hematemesis 9/28/2017    Hypertension     Renal disorder      Past Surgical History:   Procedure Laterality Date    APPENDECTOMY      ERCP      ESOPHAGOGASTRODUODENOSCOPY      LIVER TRANSPLANT       Family History   Problem Relation Age of Onset    No Known Problems Mother      Social History   Substance Use Topics    Smoking status: Former Smoker     Packs/day: 1.00     Years: 10.00     Types: Cigarettes     Start date: 2/19/2017    Smokeless tobacco: Never Used    Alcohol use No      Comment: a fifth of liquor daily for years, cut back over last 2 months       Review of Systems:  Constitutional: no fever or chills  Gastrointestinal: no nausea or vomiting, no abdominal pain or change in bowel habits    OBJECTIVE:     Vital Signs (Most Recent)  Temp: 100 °F (37.8 °C) (03/09/18 1205)  Pulse: 94 (03/09/18 1205)  Resp: 18 (03/09/18 1205)  BP: 132/80 (03/09/18 1205)  SpO2: 99 % (03/09/18 1205)    Physical Exam:  General: well developed, well nourished  Abdomen: soft, non-tender non-distented; bowel sounds normal; no masses,  no organomegaly         ASSESSMENT/PLAN:     Patient is a 27 y.o. male with elevated liver tests coming for ERCP and EUS-LB.    Plan: EUS and ERCP    Anesthesia Plan: Moderate Sedation    ASA Grade: ASA 2 - Patient with mild systemic disease with no functional limitations

## 2018-03-09 NOTE — TRANSFER OF CARE
"Anesthesia Transfer of Care Note    Patient: Jhonny Diana    Procedure(s) Performed: Procedure(s) (LRB):  ERCP (N/A)  ULTRASOUND-ENDOSCOPIC-UPPER (N/A)    Patient location: PACU    Anesthesia Type: general    Transport from OR: Transported from OR on 6-10 L/min O2 by face mask with adequate spontaneous ventilation    Post pain: adequate analgesia    Post assessment: no apparent anesthetic complications and tolerated procedure well    Post vital signs: stable    Level of consciousness: awake and responds to stimulation    Nausea/Vomiting: no nausea/vomiting    Complications: none    Transfer of care protocol was followed      Last vitals:   Visit Vitals  /64   Pulse 106   Temp 37.3 °C (99.1 °F) (Axillary)   Resp 18   Ht 5' 6" (1.676 m)   Wt 74.2 kg (163 lb 9.3 oz)   SpO2 97%   BMI 26.40 kg/m²     "

## 2018-03-09 NOTE — ASSESSMENT & PLAN NOTE
LAURA dialysisi dependant since 1016/2017  HD on TTS [facility] under the cre of Dr. Peterson  On HD for: 2.5 mo since 10/16/2017  Duration of outpatient dialysis session - 3.5 hrs  EDW - TBD  Residual Renal Function - yes     Plan:  - Will provide dialysis for metabolic clearance and volume management in am via RIJ-PermCath, duration 3.5 hrs, UF 2-3  L as tolerated by patient, maintain MAP>65, will adjust bath to chem.   - Blood pressures stable

## 2018-03-09 NOTE — SUBJECTIVE & OBJECTIVE
Interval History:   Patient evaluated at bedside in the NINO, no significant event overnight, hemodynamically stable.     Review of patient's allergies indicates:   Allergen Reactions    Bactrim [sulfamethoxazole-trimethoprim] Other (See Comments)     Mookie Trell Syndrome     Current Facility-Administered Medications   Medication Frequency    0.9%  NaCl infusion (for blood administration) Q24H PRN    0.9%  NaCl infusion PRN    acetaminophen tablet 650 mg Q6H PRN    albuterol-ipratropium 2.5mg-0.5mg/3mL nebulizer solution 3 mL Q4H PRN    bisacodyl EC tablet 10 mg Daily    bisacodyl suppository 10 mg Daily PRN    ceFEPIme (MAXIPIME) 1 gram injection     ceFEPIme injection 1 g Q12H    cycloSPORINE modified (NEORAL) 100 MG capsule     cycloSPORINE modified (NEORAL) 25 MG capsule     cycloSPORINE modified capsule 125 mg BID    dextrose 50% injection 12.5 g PRN    dextrose 50% injection 25 g PRN    diphenhydrAMINE-zinc acetate 1-0.1% cream TID PRN    docusate sodium capsule 100 mg BID    epoetin maurisio injection 10,000 Units Every Mon, Wed, Fri    ergocalciferol capsule 50,000 Units Q7 Days    fat emulsion 20% infusion 250 mL Once    glucagon (human recombinant) injection 1 mg PRN    glucose chewable tablet 16 g PRN    glucose chewable tablet 24 g PRN    heparin (porcine) injection 1,000 Units PRN    heparin (porcine) injection 5,000 Units Q8H    isavuconazonium sulfate 372 mg in sodium chloride 0.9% 250 mL Q24H    levetiracetam oral soln Soln 500 mg BID    levothyroxine (SYNTHROID) 75 MCG tablet     levothyroxine tablet 75 mcg Before breakfast    metronidazole (FLAGYL) 500 mg/100 mL IVPB     metronidazole IVPB 500 mg Q8H    multivitamin tablet 1 tablet Daily    naloxone 0.4 mg/mL injection 0.2 mg PRN    omeprazole capsule 40 mg QAM    ondansetron disintegrating tablet 8 mg Q6H PRN    ondansetron injection 4 mg Q6H PRN    oxyCODONE immediate release tablet 5 mg Q6H PRN    polyethylene  glycol packet 17 g BID    prochlorperazine injection Soln 10 mg Q6H PRN    psyllium husk (aspartame) 3.4 gram PwPk 1 packet BID    simethicone chewable tablet 80 mg TID PRN    sodium chloride 0.9% flush 3 mL PRN    TPN ADULT CENTRAL LINE CUSTOM Continuous    TPN ADULT CENTRAL LINE CUSTOM Continuous    ursodiol oral suspension (conc: 60 mg/mL) 300 mg BID       Objective:     Vital Signs (Most Recent):  Temp: 98.4 °F (36.9 °C) (03/09/18 0745)  Pulse: 85 (03/09/18 0915)  Resp: 18 (03/09/18 0745)  BP: 134/69 (03/09/18 0915)  SpO2: (!) 94 % (03/09/18 0445)  O2 Device (Oxygen Therapy): room air (03/09/18 0745) Vital Signs (24h Range):  Temp:  [98.4 °F (36.9 °C)-101.1 °F (38.4 °C)] 98.4 °F (36.9 °C)  Pulse:  [84-95] 85  Resp:  [16-20] 18  SpO2:  [93 %-95 %] 94 %  BP: (127-139)/(69-89) 134/69     Weight: 74.2 kg (163 lb 9.3 oz) (03/09/18 0500)  Body mass index is 26.4 kg/m².  Body surface area is 1.86 meters squared.    I/O last 3 completed shifts:  In: 1723.9 [P.O.:235; IV Piggyback:450]  Out: 150 [Urine:150]    Physical Exam   Constitutional: He is oriented to person, place, and time.   HENT:   Mouth/Throat: No oropharyngeal exudate.   Eyes: No scleral icterus.   Cardiovascular: Normal rate and regular rhythm.    Pulmonary/Chest: Effort normal and breath sounds normal. No respiratory distress. He has no wheezes. He has no rales.   Abdominal: Soft. There is no rebound.   Musculoskeletal: He exhibits edema.   Lymphadenopathy:     He has no cervical adenopathy.   Neurological: He is alert and oriented to person, place, and time.   Skin: He is not diaphoretic.       Significant Labs:  ABGs: No results for input(s): PH, PCO2, HCO3, POCSATURATED, BE in the last 168 hours.  BMP:   Recent Labs  Lab 03/09/18  0500   GLU 85   CL 99   CO2 26   BUN 57*   CREATININE 3.9*   CALCIUM 9.4   MG 1.9     CBC:   Recent Labs  Lab 03/09/18  0500   WBC 8.13   RBC 2.34*   HGB 7.5*   HCT 22.2*      MCV 95   MCH 32.1*   MCHC 33.8      CMP:   Recent Labs  Lab 03/09/18  0500   GLU 85   CALCIUM 9.4   ALBUMIN 1.8*   PROT 5.3*      K 3.5   CO2 26   CL 99   BUN 57*   CREATININE 3.9*   ALKPHOS 142*   ALT 8*   AST 41*   BILITOT 2.0*     All labs within the past 24 hours have been reviewed.

## 2018-03-09 NOTE — SUBJECTIVE & OBJECTIVE
Scheduled Meds:   bisacodyl  10 mg Oral Daily    ceFEPIme        ceFEPime (MAXIPIME) IVPB  1 g Intravenous Q12H    cycloSPORINE modified        cycloSPORINE modified        cycloSPORINE modified  125 mg Oral BID    docusate sodium  100 mg Oral BID    epoetin maurisio (PROCRIT) injection  10,000 Units Intravenous Every Mon, Wed, Fri    ergocalciferol  50,000 Units Oral Q7 Days    fat emulsion 20%  250 mL Intravenous Once    heparin (porcine)  5,000 Units Subcutaneous Q8H    custom IVPB builder   Intravenous Q24H    levetiracetam oral soln  500 mg Oral BID    levothyroxine        levothyroxine  75 mcg Oral Before breakfast    metronidazole  500 mg Intravenous Q8H    multivitamin  1 tablet Oral Daily    omeprazole  40 mg Oral QAM    polyethylene glycol  17 g Oral BID    psyllium husk (aspartame)  3.4 g Oral BID    ursodiol  300 mg Oral BID     Continuous Infusions:   TPN ADULT CENTRAL LINE CUSTOM 48 mL/hr at 03/08/18 2253    TPN ADULT CENTRAL LINE CUSTOM       PRN Meds:sodium chloride, sodium chloride 0.9%, acetaminophen, albuterol-ipratropium 2.5mg-0.5mg/3mL, bisacodyl, dextrose 50%, dextrose 50%, diphenhydrAMINE-zinc acetate 1-0.1%, glucagon (human recombinant), glucose, glucose, naloxone, ondansetron, ondansetron, oxyCODONE, prochlorperazine, simethicone, sodium chloride 0.9%, sodium chloride 0.9%    Review of Systems   Unable to perform ROS: Mental status change     Objective:     Vital Signs (Most Recent):  Temp: 100 °F (37.8 °C) (03/09/18 1530)  Pulse: 102 (03/09/18 1530)  Resp: 18 (03/09/18 1534)  BP: 130/72 (03/09/18 1530)  SpO2: (!) 94 % (03/09/18 1530) Vital Signs (24h Range):  Temp:  [98.4 °F (36.9 °C)-101.1 °F (38.4 °C)] 100 °F (37.8 °C)  Pulse:  [] 102  Resp:  [16-20] 18  SpO2:  [93 %-99 %] 94 %  BP: (123-139)/(64-89) 130/72     Weight: 74.2 kg (163 lb 9.3 oz)  Body mass index is 26.4 kg/m².    Intake/Output - Last 3 Shifts       03/07 0700 - 03/08 0659 03/08 0700 - 03/09 0659  03/09 0700 - 03/10 0659    P.O. 245 110 0    I.V. (mL/kg)       Other 650  550    IV Piggyback  450     .7 439.2     Total Intake(mL/kg) 1830.7 (25) 999.2 (13.5) 550 (7.4)    Urine (mL/kg/hr) 0 (0) 150 (0.1)     Emesis/NG output 0 (0)      Other 3650 (2.1)  2850 (3.9)    Stool 0 (0)      Blood 0 (0)      Total Output 3650 150 2850    Net -1819.3 +849.2 -2300           Urine Occurrence 0 x      Stool Occurrence 1 x      Emesis Occurrence 0 x            Physical Exam   Constitutional: He appears well-developed. He appears lethargic.   HENT:   Head: Normocephalic.   Eyes: Pupils are equal, round, and reactive to light. Scleral icterus is present.   Cardiovascular: Normal rate, regular rhythm and intact distal pulses.    Pulmonary/Chest: Effort normal and breath sounds normal.   Abdominal: Bowel sounds are normal. He exhibits distension. There is tenderness.   Musculoskeletal: Normal range of motion. He exhibits edema.   Neurological: He appears lethargic. GCS eye subscore is 3. GCS verbal subscore is 3. GCS motor subscore is 5.   Skin: Skin is warm and dry.       Laboratory:  Immunosuppressants         Stop Route Frequency     cycloSPORINE modified (NEORAL) 25 MG capsule      03/09 1714       cycloSPORINE modified (NEORAL) 100 MG capsule      03/09 1714       cycloSPORINE modified capsule 125 mg      -- Oral 2 times daily     cycloSPORINE modified (NEORAL) 100 MG capsule      03/09 0514       cycloSPORINE modified (NEORAL) 25 MG capsule      03/09 0514          CBC:   Recent Labs  Lab 03/09/18  0500   WBC 8.13   RBC 2.34*   HGB 7.5*   HCT 22.2*      MCV 95   MCH 32.1*   MCHC 33.8     CMP:   Recent Labs  Lab 03/09/18  0500   GLU 85   CALCIUM 9.4   ALBUMIN 1.8*   PROT 5.3*      K 3.5   CO2 26   CL 99   BUN 57*   CREATININE 3.9*   ALKPHOS 142*   ALT 8*   AST 41*   BILITOT 2.0*     Coagulation: No results for input(s): PT, INR, APTT in the last 168 hours.  Labs within the past 24 hours have been  reviewed.    Diagnostic Results:  None

## 2018-03-09 NOTE — PLAN OF CARE
Problem: Hemodialysis (Adult)  Intervention: Protect/Monitor Dialysis Access Site  Rt chest tunnel catheter accessed without complications + aspirate and ports flush well

## 2018-03-09 NOTE — PLAN OF CARE
Pt oriented to person,place and time; speech slurred. SpO2>95% on room air.  Pt's temp was 101.1,PRN tylenol given.  PRN oxycodone given.  Pt's mother at bedside.  Pt up with standby assist.  Bed locked, in lowest position, bedrails up x2 and call bell in reach.  Fall precautions maintained.  See flowsheet for assessment findings.  Will continue to monitor.

## 2018-03-09 NOTE — NURSING
Pt given pain medication (Oxy 5mg) and immediately threw up. PRN zofran given and perm cath dressing changed due to being soiled. Pt wanted to wait to get pain medication again after zofran kicked in. Will continue to monitor.

## 2018-03-09 NOTE — PROGRESS NOTES
Patient back on unit from endoscopy. ERCP was unable to be completed today d/t scheduling - will be done tomorrow. Patient's mother at bedside, aware of plan. NP also aware of change of plan. NP to order blood cultures x2 (peripheral set and set from J [facility]) - will administer antibiotics once cultures drawn.     Spoke with EEG re: scheduling. EEG tech states test will be done tomorrow. Notified tech that patient has HD and ERCP tomorrow.

## 2018-03-09 NOTE — NURSING TRANSFER
Nursing Transfer Note      3/9/2018     Transfer To: [id number]    Transfer via stretcher    Transfer with IV pole w/ TPN, multiple bags of belongings    Transported by PCT    Medicines sent: none    Chart send with patient: Yes    Notified: liaison     Patient reassessed at: 1538

## 2018-03-10 LAB
ALBUMIN SERPL BCP-MCNC: 1.9 G/DL
ALP SERPL-CCNC: 137 U/L
ALT SERPL W/O P-5'-P-CCNC: 10 U/L
ANION GAP SERPL CALC-SCNC: 9 MMOL/L
ANISOCYTOSIS BLD QL SMEAR: SLIGHT
AST SERPL-CCNC: 41 U/L
BASOPHILS NFR BLD: 0 %
BILIRUB SERPL-MCNC: 2.2 MG/DL
BUN SERPL-MCNC: 42 MG/DL
CALCIUM SERPL-MCNC: 9.3 MG/DL
CHLORIDE SERPL-SCNC: 105 MMOL/L
CO2 SERPL-SCNC: 23 MMOL/L
CREAT SERPL-MCNC: 3.2 MG/DL
CREAT UR-MCNC: 41 MG/DL
CYCLOSPORINE BLD LC/MS/MS-MCNC: 278 NG/ML
DIFFERENTIAL METHOD: ABNORMAL
EOSINOPHIL NFR BLD: 0 %
ERYTHROCYTE [DISTWIDTH] IN BLOOD BY AUTOMATED COUNT: 17.6 %
EST. GFR  (AFRICAN AMERICAN): 28.9 ML/MIN/1.73 M^2
EST. GFR  (NON AFRICAN AMERICAN): 25 ML/MIN/1.73 M^2
GLUCOSE SERPL-MCNC: 102 MG/DL
HCT VFR BLD AUTO: 23.2 %
HGB BLD-MCNC: 7.7 G/DL
HYPOCHROMIA BLD QL SMEAR: ABNORMAL
IMM GRANULOCYTES # BLD AUTO: ABNORMAL K/UL
IMM GRANULOCYTES NFR BLD AUTO: ABNORMAL %
LYMPHOCYTES NFR BLD: 13 %
MAGNESIUM SERPL-MCNC: 2.1 MG/DL
MCH RBC QN AUTO: 31.4 PG
MCHC RBC AUTO-ENTMCNC: 33.2 G/DL
MCV RBC AUTO: 95 FL
MONOCYTES NFR BLD: 9 %
NEUTROPHILS NFR BLD: 78 %
NRBC BLD-RTO: 0 /100 WBC
OVALOCYTES BLD QL SMEAR: ABNORMAL
PHOSPHATE SERPL-MCNC: 2.4 MG/DL
PLATELET # BLD AUTO: 188 K/UL
PLATELET BLD QL SMEAR: ABNORMAL
PMV BLD AUTO: 10.6 FL
POIKILOCYTOSIS BLD QL SMEAR: SLIGHT
POLYCHROMASIA BLD QL SMEAR: ABNORMAL
POTASSIUM SERPL-SCNC: 3.7 MMOL/L
PROT SERPL-MCNC: 5.7 G/DL
PROT UR-MCNC: 117 MG/DL
PROT/CREAT RATIO, UR: 2.85
RBC # BLD AUTO: 2.45 M/UL
SODIUM SERPL-SCNC: 137 MMOL/L
WBC # BLD AUTO: 7.81 K/UL

## 2018-03-10 PROCEDURE — 84156 ASSAY OF PROTEIN URINE: CPT

## 2018-03-10 PROCEDURE — 94664 DEMO&/EVAL PT USE INHALER: CPT

## 2018-03-10 PROCEDURE — 63600175 PHARM REV CODE 636 W HCPCS: Performed by: NURSE PRACTITIONER

## 2018-03-10 PROCEDURE — 25000003 PHARM REV CODE 250: Performed by: NURSE PRACTITIONER

## 2018-03-10 PROCEDURE — 25000003 PHARM REV CODE 250: Performed by: PHYSICIAN ASSISTANT

## 2018-03-10 PROCEDURE — 85027 COMPLETE CBC AUTOMATED: CPT

## 2018-03-10 PROCEDURE — 84100 ASSAY OF PHOSPHORUS: CPT

## 2018-03-10 PROCEDURE — 80158 DRUG ASSAY CYCLOSPORINE: CPT

## 2018-03-10 PROCEDURE — 25000003 PHARM REV CODE 250: Performed by: SURGERY

## 2018-03-10 PROCEDURE — 63600175 PHARM REV CODE 636 W HCPCS: Mod: JG | Performed by: SURGERY

## 2018-03-10 PROCEDURE — 97165 OT EVAL LOW COMPLEX 30 MIN: CPT

## 2018-03-10 PROCEDURE — 20600001 HC STEP DOWN PRIVATE ROOM

## 2018-03-10 PROCEDURE — 85007 BL SMEAR W/DIFF WBC COUNT: CPT

## 2018-03-10 PROCEDURE — 99233 SBSQ HOSP IP/OBS HIGH 50: CPT | Mod: ,,, | Performed by: INTERNAL MEDICINE

## 2018-03-10 PROCEDURE — 83735 ASSAY OF MAGNESIUM: CPT

## 2018-03-10 PROCEDURE — S0030 INJECTION, METRONIDAZOLE: HCPCS | Performed by: NURSE PRACTITIONER

## 2018-03-10 PROCEDURE — A4217 STERILE WATER/SALINE, 500 ML: HCPCS | Performed by: NURSE PRACTITIONER

## 2018-03-10 PROCEDURE — 99233 SBSQ HOSP IP/OBS HIGH 50: CPT | Mod: ,,, | Performed by: PSYCHIATRY & NEUROLOGY

## 2018-03-10 PROCEDURE — 99233 SBSQ HOSP IP/OBS HIGH 50: CPT | Mod: ,,, | Performed by: NURSE PRACTITIONER

## 2018-03-10 PROCEDURE — 80053 COMPREHEN METABOLIC PANEL: CPT

## 2018-03-10 PROCEDURE — B4185 PARENTERAL SOL 10 GM LIPIDS: HCPCS | Performed by: NURSE PRACTITIONER

## 2018-03-10 RX ORDER — SIROLIMUS 1 MG/1
3 TABLET, FILM COATED ORAL ONCE
Status: COMPLETED | OUTPATIENT
Start: 2018-03-10 | End: 2018-03-10

## 2018-03-10 RX ORDER — PREDNISONE 10 MG/1
10 TABLET ORAL DAILY
Status: CANCELLED | OUTPATIENT
Start: 2018-03-10

## 2018-03-10 RX ADMIN — CEFEPIME 1 G: 1 INJECTION, POWDER, FOR SOLUTION INTRAMUSCULAR; INTRAVENOUS at 05:03

## 2018-03-10 RX ADMIN — OXYCODONE HYDROCHLORIDE 5 MG: 5 TABLET ORAL at 02:03

## 2018-03-10 RX ADMIN — METRONIDAZOLE 500 MG: 500 INJECTION, SOLUTION INTRAVENOUS at 05:03

## 2018-03-10 RX ADMIN — METRONIDAZOLE 500 MG: 500 INJECTION, SOLUTION INTRAVENOUS at 12:03

## 2018-03-10 RX ADMIN — HYDROCORTISONE SODIUM SUCCINATE 50 MG: 100 INJECTION, POWDER, FOR SOLUTION INTRAMUSCULAR; INTRAVENOUS at 01:03

## 2018-03-10 RX ADMIN — LEVETIRACETAM 500 MG: 100 SOLUTION ORAL at 10:03

## 2018-03-10 RX ADMIN — CYCLOSPORINE 125 MG: 100 CAPSULE, LIQUID FILLED ORAL at 08:03

## 2018-03-10 RX ADMIN — METRONIDAZOLE 500 MG: 500 INJECTION, SOLUTION INTRAVENOUS at 08:03

## 2018-03-10 RX ADMIN — HEPARIN SODIUM 5000 UNITS: 5000 INJECTION, SOLUTION INTRAVENOUS; SUBCUTANEOUS at 05:03

## 2018-03-10 RX ADMIN — LEVOTHYROXINE SODIUM 75 MCG: 75 TABLET ORAL at 05:03

## 2018-03-10 RX ADMIN — HYDROCORTISONE SODIUM SUCCINATE 50 MG: 100 INJECTION, POWDER, FOR SOLUTION INTRAMUSCULAR; INTRAVENOUS at 10:03

## 2018-03-10 RX ADMIN — SODIUM CHLORIDE: 234 INJECTION INTRAMUSCULAR; INTRAVENOUS; SUBCUTANEOUS at 10:03

## 2018-03-10 RX ADMIN — PROCHLORPERAZINE EDISYLATE 10 MG: 5 INJECTION INTRAMUSCULAR; INTRAVENOUS at 02:03

## 2018-03-10 RX ADMIN — BISACODYL 10 MG: 5 TABLET, COATED ORAL at 07:03

## 2018-03-10 RX ADMIN — PROCHLORPERAZINE EDISYLATE 10 MG: 5 INJECTION INTRAMUSCULAR; INTRAVENOUS at 07:03

## 2018-03-10 RX ADMIN — OMEPRAZOLE 40 MG: 40 CAPSULE, DELAYED RELEASE ORAL at 08:03

## 2018-03-10 RX ADMIN — URSODIOL 300 MG: 300 CAPSULE ORAL at 10:03

## 2018-03-10 RX ADMIN — SODIUM CHLORIDE: 9 INJECTION, SOLUTION INTRAVENOUS at 01:03

## 2018-03-10 RX ADMIN — HEPARIN SODIUM 5000 UNITS: 5000 INJECTION, SOLUTION INTRAVENOUS; SUBCUTANEOUS at 10:03

## 2018-03-10 RX ADMIN — URSODIOL 300 MG: 300 CAPSULE ORAL at 08:03

## 2018-03-10 RX ADMIN — ONDANSETRON HYDROCHLORIDE 4 MG: 2 INJECTION, SOLUTION INTRAMUSCULAR; INTRAVENOUS at 10:03

## 2018-03-10 RX ADMIN — OXYCODONE HYDROCHLORIDE 5 MG: 5 TABLET ORAL at 08:03

## 2018-03-10 RX ADMIN — SIROLIMUS 3 MG: 1 TABLET, SUGAR COATED ORAL at 10:03

## 2018-03-10 RX ADMIN — OXYCODONE HYDROCHLORIDE 5 MG: 5 TABLET ORAL at 05:03

## 2018-03-10 RX ADMIN — SOYBEAN OIL 250 ML: 20 INJECTION, SOLUTION INTRAVENOUS at 10:03

## 2018-03-10 NOTE — PT/OT/SLP PROGRESS
Physical Therapy      Patient Name:  Jhonny Diana   MRN:  [id number]    Patient not seen today secondary to pt out of the room throughout the day.  HD at 700 for 4 hours, ERCP at 1100, and encephalogram at 1400 for 1.5 hours. Will follow-up with pt at the next scheduled tx session.    Tory Amato, PT

## 2018-03-10 NOTE — ASSESSMENT & PLAN NOTE
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, CMV D+/R+, steroid induction, on maintenance tacro/MMF/pred; c/b seizures, LAURA, superificial wound infection s/p wound vac to [facility] through 1/12, and several recent admissions on 11/24 and 12/3 with culture negative peritonitis with peak ascites WBC ~5k due to suspected indolent biliary leakage s/p sphincterotomy/biliary stent placement over CBD stricture on 12/6 ERCP vs another IA source) who was admitted on 1/11/2017 with acute onset fevers and acute on chronic N/V/abdominal pain (RLQ worst) and was found to have a loculated RLQ collection on imaging (not safe for percutaneous drainage), for which, after failure with more conservative treatments, he ultimately underwent laparoscopic drainage on 2/7/2018 (with dark brown fluid drained, culture negative but after extensive antibiotic exposure) but without complete resolution. He achieved defervescence but had persistent N/V/abdominal pain on empiric ertapenem with persistent loculated fluid noted on imaging that is not accessible to percutaneous drainage. 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 More recently he has developed AMS/neurologic symptoms concerning for either a new etiology of fevers or a toxicity of ertapenem (negative MRI, LP pending) -- of note, the  fact that his fevers recurred after cessation of ertapenem implies that this agent may still be treated an ESBL organism we cannot culture from his abdomen. He remains tenuous with a course otherwise c/b ongoing ileus and malnutrition.     - would continue empiric vanc/cefepime/flagyl for now but if spiking through these agents, would consider transition to meropenem for ESBL coverage  - additional prophylaxis per protocol (valcyte, atovaquone given bactrim allergy, and isavuconazole given complex course)  - await ulcer biopsy results and ERCP results from today  - agree with LP - send for cell count/diff, protein, glucose, bacterial/fungal/AFB stain/culture, HSV PCR, CMV PCR, EBV PCR, HHV6 PCR, crypto antigen, TB PCR, and toxo PCR

## 2018-03-10 NOTE — PT/OT/SLP EVAL
"Occupational Therapy   Evaluation    Name: Jhonny Diana  MRN: [id number]  Admitting Diagnosis:  Fever 1 Day Post-Op    Recommendations:     Discharge Recommendations: nursing facility, skilled  Discharge Equipment Recommendations:  none  Barriers to discharge:  None    History:     Occupational Profile:  Living Environment/ PLOF: Pt currently residing at [facility] apartments on 1st level with 0 ALEXA. Pt was (I) but has declined gradually requiring increased A and AD for tasks. Pt has WC, RW, and bedside commode for support. 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  Pain/Comfort:  · Pain Rating 1:  (severe nausea reported when asked about pain)  · Pain Rating Post-Intervention 1:  (no change reported)    Patients cultural, spiritual, Buddhist conflicts given the current situation: no    Objective:     Patient found with: peripheral IV, central line    General Precautions: Standard, fall   Orthopedic Precautions:    Braces:       Occupational Performance:    Bed Mobility:   Rolling: NA   Supine>Sit: Mod A   Sit>Supine: NA   Scooting/Bridging: CGA to EOB    Functional Mobility/ Transfers:   Sit>Stand: Min A x2 from EOB x2 trials   Stand>Sit: CGA to EOB, Min A for safety to couch   Toilet: NA    Pt performed functional mobility ~8ft with Min A x2 person BHHA. Pt declined AD for functional OOB ax.      Activities " of Daily Living:  UE Dressing: declined  LE Dressing: Moderate Assistance don underwear.  Grooming: declined    Balance:  Static Sitting:           good  Dynamic Sitting:      good  Static Standing:       fair+  Dynamic Standing:  fair      Cognitive/Visual Perceptual:  Cognitive/Psychosocial Skills:     Oriented to: Person, Place, Time and Situation   Follows Commands/attention:Follows one-step commands  Communication: clear/fluent  Memory: No Deficits noted  Safety awareness/insight to disability: impaired   Mood/Affect/Coping skills/emotional control: Appropriate to situation  Visual/Perceptual:      -Intact    Physical Exam:  Postural examination/scapula alignment:    -       Rounded shoulders  Skin integrity: Visible skin intact  Edema:  Mild BUE  Sensation:    -       Intact  Motor Planning: WFL  Dominant hand: R  Upper Extremity Range of Motion:     -       Right Upper Extremity: WFL  -       Left Upper Extremity: WFL  Upper Extremity Strength:    -       Right Upper Extremity: grossly 4/5  -       Left Upper Extremity: grossly 4/5   Strength:    -       Right Upper Extremity: fair  -       Left Upper Extremity: fair  Fine Motor Coordination:    -       Intact  Gross motor coordination:   WFL      Patient left seated on couch with all lines intact, call button in reach and RN notified    AMPA 6 Click:  AMPA Total Score: 15    Treatment & Education:  Pt educated on safety with daily tasks OOB, and importance of participating in daily ax. Pt whiteboard updated.    Education:    Assessment:     Jhonny Diana is a 27 y.o. male with a medical diagnosis of Fever. Pt tolerated session well and put forth good effort to participate. Pt presented with decreased (I), endurance, stability and safety for ADLs, self-care and functional mobility. Pt will benefit from further OT in order to maximize (I) and safety for functional tasks.   Performance deficits affecting function are weakness, impaired functional mobilty,  "gait instability, impaired endurance, impaired balance, impaired self care skills.      Rehab Prognosis:  good; patient would benefit from acute skilled OT services to address these deficits and reach maximum level of function.         Clinical Decision Makin.  OT Low:  "Pt evaluation falls under low complexity for evaluation coding due to performance deficits noted in 1-3 areas as stated above and 0 co-morbities affecting current functional status. Data obtained from problem focused assessments. No modifications or assistance was required for completion of evaluation. Only brief occupational profile and history review completed."     Plan:     Patient to be seen 4 x/week to address the above listed problems via self-care/home management, community/work re-entry, therapeutic activities, therapeutic exercises  · Plan of Care Expires: 18  · Plan of Care Reviewed with: patient, mother    This Plan of care has been discussed with the patient who was involved in its development and understands and is in agreement with the identified goals and treatment plan    GOALS:    Occupational Therapy Goals        Problem: Occupational Therapy Goal    Goal Priority Disciplines Outcome Interventions   Occupational Therapy Goal     OT, PT/OT     Description:  Goals to be met by:  2 Weeks (3/24/2018)    Patient will increase functional independence with ADLs by performin. Supine to sit with Supervision.  2. Sit to Stand transfers with Supervision.   3. Toilet transfer to toilet with Supervision.  4. Grooming while standing with Supervision.  5. UE Dressing with Supervision.  6. LE Dressing with Minimal Assistance.                         Time Tracking:     OT Date of Treatment: 03/10/18  OT Start Time: 1316  OT Stop Time: 1330  OT Total Time (min): 14 min    Billable Minutes:Evaluation 14    JULIETTE Cooper  3/10/2018    "

## 2018-03-10 NOTE — ASSESSMENT & PLAN NOTE
Slurred and mumbled speech reported since 3/1 with increased lethargy, confusion, arm movements while sleeping and gait difficulty.   Complicated hospital course with ATN requiring dialysis, superficial wound infection with wound vac, multiple admissions for fevers, peritonitis, persistent ascites, biliary strictures requiring ERCP and biliary stenting, pleural effusion s/p thoracentesis and malnutrition on TPN.    On exam, patient has decreased attention and concentration. Low and mumbled speech, but able to repeat phrases without difficulty or apparent receptive aphasia. Suspect mental status change on 3/1 is due to recent infection with Abx and possible s/e from prograf complicated by ICU delirium after being admitted for 55 days. CT head and MRI brain without contrast were unremarkable for acute intracranial pathology.     ->continue delirium precautions with good sleep hygiene and regulation of sleep/wake cycle. Limit interruptions in the middle of the night.   ->encouraged to limit sedating medications and narcotics as able  - although index of suspicion of CNS infection is low, agree with plan for LP

## 2018-03-10 NOTE — PLAN OF CARE
Problem: Patient Care Overview  Goal: Plan of Care Review  Outcome: Ongoing (interventions implemented as appropriate)  Pt is AAOx3.Pt needs stand by assistance only. Family at bedside. Prn ondansetron given to pt for nauseaPt denies pian and/or discomfort at this time.Standard precautions maintained.  Pt turns independently, pt is aware of bony area and pressure reduction positions Pt remains injury and fall free, non skid footwear donned, call light within reach, personal items within reach, bed in low/locked position, pt able to voice needs all needs voiced have been met at this time.

## 2018-03-10 NOTE — ASSESSMENT & PLAN NOTE
- Started on vanc/cefepime for ERCP. Now transitioned to vanc/zosyn.  - Blood cultures sent, prelim NGTD. Repeat blood cultures and chest xray today 2/10.  - thoracentesis 2/1 - fluid negative for infection.  - atelectasis noted on ct scan - start breathing tx & CPT.  - now cmv positive - started treatment 2/5/18.  - taken to OR 2/7 for exp lap - fluid collections drained, cultures pending.  - Patient with low grade fever 2/15, 100.3.  - CT A/P 2/15 with no evidence of IA fluid infection. Did show R pleural effusion s/p thoracentesis, cell count negative for infection, cx pending.  - Repeat blood cx 2/15 prelim NGTD, CMV PCR 2/15 negative   Complaining of abdominal pain. Is tender in RUQ.  - Remains with fevers.  - Bilirubin remains elevated. Concerning for cholangitis. Antibiotics broadened 2/17. ID re consulted.   - ERCP  2/19, overall unremarkable findings. Did place new larger stents. Will need repeat ERCP in 10 weeks. Bilirubin is stable post ERCP.  -Tmax 101 2/21 PM again ID following. Plan to transition vanc/zosyn to ertapenem 2/22 as no improvement noted.   - Erta started 2/22.  - CT A/P obtained 2/26 with focal/loculated region of fluid in the lower right abdomen measuring approximately 8.7 x 5.5 cm.   - IR attempted drainage on fluid collection on 2/27, however, per IR,there was limited fluid interposed with bowel in the RLQ with no safe pocket seen for drainage.  -Tagged WBC scan 2/27, negative results.  - Remains with low grade fevers.   - Per transplant attending, will hold off on EGD at this time as concern anesthesia will put patient at further increase risk for seizure activity. Will plan for potenial EGD on Monday. Discussed with GI, appreciate their assistance.  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 EBV PCR negative. Adenovirus pending. Repeat CMV PCR negative.    - Back pain and abdominal pain persists. Last BM 3/5 with brown bomb.   - ID following. Cont Ertapenem dc'd 3/7 - due to  AMS   - tmax 101.1 overnight 3/8 - Cefepime/vanc restarted for procedures. Monitor.

## 2018-03-10 NOTE — SUBJECTIVE & OBJECTIVE
Interval History: Fever curve remains worse off ESBL coverage. No improvement in neurologic symptoms today.    Review of Systems   Constitutional: Positive for activity change, appetite change and fatigue. Negative for chills and fever.   HENT: Negative for congestion and facial swelling.    Eyes: Negative for pain, discharge and visual disturbance.   Respiratory: Negative for cough, chest tightness, shortness of breath and wheezing.    Cardiovascular: Negative for chest pain, palpitations and leg swelling.   Gastrointestinal: Positive for abdominal distention, abdominal pain and nausea. Negative for constipation and vomiting.   Endocrine: Negative.    Genitourinary:        Anuric   Musculoskeletal: Negative for arthralgias, back pain and myalgias.   Skin: Positive for color change and wound. Negative for rash.   Allergic/Immunologic: Positive for immunocompromised state.   Neurological: Negative for dizziness, seizures, weakness and headaches.   Psychiatric/Behavioral: Positive for confusion, decreased concentration and dysphoric mood. The patient is not nervous/anxious.    All other systems reviewed and are negative.      Objective:     Vital Signs (Most Recent):  Temp: 99.2 °F (37.3 °C) (02/17/18 1542)  Pulse: 90 (02/17/18 1542)  Resp: 16 (02/17/18 1542)  BP: 135/85 (02/17/18 1542)  SpO2: 95 % (02/17/18 1542) Vital Signs (24h Range):  Temp:  [98.6 °F (37 °C)-101.2 °F (38.4 °C)] 99.2 °F (37.3 °C)  Pulse:  [84-92] 90  Resp:  [16-20] 16  SpO2:  [92 %-96 %] 95 %  BP: (128-137)/(75-85) 135/85     Weight: 60.2 kg (132 lb 11.5 oz)  Body mass index is 21.42 kg/m².    Estimated Creatinine Clearance: 26.8 mL/min (A) (based on SCr of 3.5 mg/dL (H)).    Physical Exam   Constitutional: He is oriented to person, place, and time. He appears well-developed. No distress.   Temporal and distal extremity muscle wasting   HENT:   Head: Normocephalic and atraumatic.   Mouth/Throat: No oropharyngeal exudate.   dylan tube   Eyes: EOM are  normal. Pupils are equal, round, and reactive to light. No scleral icterus.   Neck: Normal range of motion. Neck supple. No JVD present. No thyromegaly present.   Cardiovascular: Normal rate, regular rhythm, normal heart sounds and intact distal pulses.    No murmur heard.  Pulmonary/Chest: Effort normal. No respiratory distress. He has no wheezes. He exhibits no tenderness.   Diminished to RLL   Abdominal: Soft. Bowel sounds are normal. He exhibits ascites. He exhibits no distension. There is tenderness. There is no rebound and no guarding.   Dressing to chevron.  Wd vac removed 1/12/18  Dependent edema present R flank; surrounding erythema   Musculoskeletal: Normal range of motion. He exhibits no tenderness. Edema: 2+ LE edema.   Neurological: He is alert and oriented to person, place, and time. He has normal reflexes.   Skin: Skin is warm and dry. He is not diaphoretic. No erythema.   Psychiatric: His mood appears not anxious.   Nursing note and vitals reviewed.      Significant Labs:   CBC:     Recent Labs  Lab 02/16/18  0436 02/17/18  0343   WBC 2.85* 2.23*   HGB 8.8* 7.8*   HCT 26.8* 23.9*    134*     CMP:     Recent Labs  Lab 02/16/18  0436 02/17/18  0343   * 137   K 4.0 3.8    105   CO2 23 23   GLU 84 81   BUN 23* 13   CREATININE 5.1* 3.5*   CALCIUM 9.5 8.8   PROT 5.6* 5.1*   ALBUMIN 2.0* 1.8*   BILITOT 2.2* 2.1*   ALKPHOS 125 111   AST 25 18   ALT <5* <5*   ANIONGAP 11 9   EGFRNONAA 14.2* 22.4*       Significant Imaging: I have reviewed all pertinent imaging results/findings within the past 24 hours.     CT CAP:   Post surgical changes prior hepatic transplant.  Percutaneous surgical drain with notable decrease in size of fluid collection in lower abdomen.  Small amount of fluid tracking elsewhere within the abdomen and pelvis.  Large volume right pleural fluid, increased in size since prior exam, with associated right lower lobe atelectasis.  Diffuse body wall edema.  Small pericardial  effusion.    Microbiology:  1/11 blood cx: negative  1/11 pleural fluid cx: negative  1/11 ascites cx: negative  1/17 blood cx: negative  1/18 blood cx: negative  1/19 pleural fluid cx: NGTD  1/19 ascites cx: NGTD  2/1 RVP negative  2/7 OR cx: NGTD  2/11 blood cx: negative  2/15 blood cx: negative  2/16 pleural fluid cx: negative

## 2018-03-10 NOTE — SUBJECTIVE & OBJECTIVE
Interval History: Fever curve remains worse off ESBL coverage. No improvement in neurologic symptoms yet.    Review of Systems   Constitutional: Positive for activity change, appetite change and fatigue. Negative for chills and fever.   HENT: Negative for congestion and facial swelling.    Eyes: Negative for pain, discharge and visual disturbance.   Respiratory: Negative for cough, chest tightness, shortness of breath and wheezing.    Cardiovascular: Negative for chest pain, palpitations and leg swelling.   Gastrointestinal: Positive for abdominal distention, abdominal pain and nausea. Negative for constipation and vomiting.   Endocrine: Negative.    Genitourinary:        Anuric   Musculoskeletal: Negative for arthralgias, back pain and myalgias.   Skin: Positive for color change and wound. Negative for rash.   Allergic/Immunologic: Positive for immunocompromised state.   Neurological: Negative for dizziness, seizures, weakness and headaches.   Psychiatric/Behavioral: Positive for decreased concentration and dysphoric mood. Negative for confusion. The patient is not nervous/anxious.    All other systems reviewed and are negative.      Objective:     Vital Signs (Most Recent):  Temp: 99.2 °F (37.3 °C) (02/17/18 1542)  Pulse: 90 (02/17/18 1542)  Resp: 16 (02/17/18 1542)  BP: 135/85 (02/17/18 1542)  SpO2: 95 % (02/17/18 1542) Vital Signs (24h Range):  Temp:  [98.6 °F (37 °C)-101.2 °F (38.4 °C)] 99.2 °F (37.3 °C)  Pulse:  [84-92] 90  Resp:  [16-20] 16  SpO2:  [92 %-96 %] 95 %  BP: (128-137)/(75-85) 135/85     Weight: 60.2 kg (132 lb 11.5 oz)  Body mass index is 21.42 kg/m².    Estimated Creatinine Clearance: 26.8 mL/min (A) (based on SCr of 3.5 mg/dL (H)).    Physical Exam   Constitutional: He is oriented to person, place, and time. He appears well-developed. No distress.   Temporal and distal extremity muscle wasting   HENT:   Head: Normocephalic and atraumatic.   Mouth/Throat: No oropharyngeal exudate.   dylan tube    Eyes: EOM are normal. Pupils are equal, round, and reactive to light. No scleral icterus.   Neck: Normal range of motion. Neck supple. No JVD present. No thyromegaly present.   Cardiovascular: Normal rate, regular rhythm, normal heart sounds and intact distal pulses.    No murmur heard.  Pulmonary/Chest: Effort normal. No respiratory distress. He has no wheezes. He exhibits no tenderness.   Diminished to RLL   Abdominal: Soft. Bowel sounds are normal. He exhibits ascites. He exhibits no distension. There is tenderness. There is no rebound and no guarding.   Dressing to chevron.  Wd vac removed 1/12/18  Dependent edema present R flank; surrounding erythema   Musculoskeletal: Normal range of motion. He exhibits no tenderness. Edema: 2+ LE edema.   Neurological: He is alert and oriented to person, place, and time. He has normal reflexes.   Skin: Skin is warm and dry. He is not diaphoretic. No erythema.   Psychiatric: His mood appears not anxious.   Nursing note and vitals reviewed.      Significant Labs:   CBC:     Recent Labs  Lab 02/16/18  0436 02/17/18  0343   WBC 2.85* 2.23*   HGB 8.8* 7.8*   HCT 26.8* 23.9*    134*     CMP:     Recent Labs  Lab 02/16/18  0436 02/17/18  0343   * 137   K 4.0 3.8    105   CO2 23 23   GLU 84 81   BUN 23* 13   CREATININE 5.1* 3.5*   CALCIUM 9.5 8.8   PROT 5.6* 5.1*   ALBUMIN 2.0* 1.8*   BILITOT 2.2* 2.1*   ALKPHOS 125 111   AST 25 18   ALT <5* <5*   ANIONGAP 11 9   EGFRNONAA 14.2* 22.4*       Significant Imaging: I have reviewed all pertinent imaging results/findings within the past 24 hours.     CT CAP:   Post surgical changes prior hepatic transplant.  Percutaneous surgical drain with notable decrease in size of fluid collection in lower abdomen.  Small amount of fluid tracking elsewhere within the abdomen and pelvis.  Large volume right pleural fluid, increased in size since prior exam, with associated right lower lobe atelectasis.  Diffuse body wall  edema.  Small pericardial effusion.    Microbiology:  1/11 blood cx: negative  1/11 pleural fluid cx: negative  1/11 ascites cx: negative  1/17 blood cx: negative  1/18 blood cx: negative  1/19 pleural fluid cx: NGTD  1/19 ascites cx: NGTD  2/1 RVP negative  2/7 OR cx: NGTD  2/11 blood cx: negative  2/15 blood cx: negative  2/16 pleural fluid cx: negative

## 2018-03-10 NOTE — ASSESSMENT & PLAN NOTE
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+, steroid induction, on maintenance tacro/MMF/pred; c/b seizures, LAURA, superificial wound infection s/p wound vac to [facility] through 1/12, and several recent admissions on 11/24 and 12/3 with culture negative peritonitis with peak ascites WBC ~5k due to suspected indolent biliary leakage s/p sphincterotomy/biliary stent placement over CBD stricture on 12/6 ERCP vs another IA source) who was admitted on 1/11/2017 with acute onset fevers and acute on chronic N/V/abdominal pain (RLQ worst) and was found to have a loculated RLQ collection on imaging (not safe for percutaneous drainage), for which, after failure with more conservative treatments, he ultimately underwent laparoscopic drainage on 2/7/2018 (with dark brown fluid drained, culture negative but after extensive antibiotic exposure) but without complete resolution. He achieved defervescence but had persistent N/V/abdominal pain on empiric ertapenem with persistent loculated fluid noted on imaging that is not accessible to percutaneous drainage. 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 More recently he has developed AMS/neurologic symptoms concerning for either a new etiology of fevers or a toxicity of ertapenem (negative MRI, LP pending) -- of note, the  fact that his fevers recurred after cessation of ertapenem implies that this agent may still be treated an ESBL organism we cannot culture from his abdomen. He remains tenuous with a course otherwise c/b ongoing ileus and malnutrition.     - would continue empiric vanc/cefepime/flagyl for now but if spiking through these agents by tomorrow after a >72h erta holiday and continues to have no improvement in neurologic issues, would transition to meropenem for ongoing empiric ESBL coverage given prior response to this agent  - additional prophylaxis per protocol (valcyte, atovaquone given bactrim allergy, and isavuconazole given complex course)  - await ulcer biopsy results   - would pursue LP - send for cell count/diff, protein, glucose, bacterial/fungal/AFB stain/culture, HSV PCR, CMV PCR, EBV PCR (please obtain quantitative PCR which is a send-out lab and not the in house qualitative PCR), HHV6 PCR, crypto antigen, TB PCR, and toxo PCR -- please reserve any remaining CSF and freeze

## 2018-03-10 NOTE — PLAN OF CARE
Problem: Occupational Therapy Goal  Goal: Occupational Therapy Goal  Goals to be met by:  2 Weeks (3/24/2018)    Patient will increase functional independence with ADLs by performin. Supine to sit with Supervision.  2. Sit to Stand transfers with Supervision.   3. Toilet transfer to toilet with Supervision.  4. Grooming while standing with Supervision.  5. UE Dressing with Supervision.  6. LE Dressing with Minimal Assistance.       OT eval completed and goals set.  JULIETTE Cooper  3/10/2018

## 2018-03-10 NOTE — SUBJECTIVE & OBJECTIVE
Subjective:     Interval History:  Continues to have waxing and waning confusion    Current Neurological Medications:     Current Facility-Administered Medications   Medication Dose Route Frequency Provider Last Rate Last Dose    0.9%  NaCl infusion (for blood administration)   Intravenous Q24H PRN Jin Perez NP        0.9%  NaCl infusion   Intravenous PRN Win Gentile  mL/hr at 03/09/18 0800      acetaminophen tablet 650 mg  650 mg Oral Q6H PRN Shereen Mcguire DNP   650 mg at 03/09/18 0034    albuterol-ipratropium 2.5mg-0.5mg/3mL nebulizer solution 3 mL  3 mL Nebulization Q4H PRN Sean Patel MD        bisacodyl EC tablet 10 mg  10 mg Oral Daily Fransisca Thomas PA-C   10 mg at 03/10/18 0759    bisacodyl suppository 10 mg  10 mg Rectal Daily PRN Margarita Swain MD   10 mg at 02/27/18 2219    ceFEPIme injection 1 g  1 g Intravenous Q12H Seda Camacho NP   1 g at 03/10/18 0508    dextrose 50% injection 12.5 g  12.5 g Intravenous PRN Citlali Bridges PA-C   12.5 g at 02/19/18 0835    dextrose 50% injection 25 g  25 g Intravenous PRN Citlali Bridges PA-C        diphenhydrAMINE-zinc acetate 1-0.1% cream   Topical (Top) TID PRN Shereen Mcguire DNP        docusate sodium capsule 100 mg  100 mg Oral BID Seda Camacho NP   100 mg at 03/08/18 2149    epoetin maurisio injection 10,000 Units  10,000 Units Intravenous Every Mon, Wed, Fri Derrick Turner NP   10,000 Units at 03/09/18 0914    ergocalciferol capsule 50,000 Units  50,000 Units Oral Q7 Days Mandy Crespo NP   50,000 Units at 03/08/18 1721    fat emulsion 20% infusion 250 mL  250 mL Intravenous Once Seda Camacho NP        glucagon (human recombinant) injection 1 mg  1 mg Intramuscular PRN Citlali Bridges PA-C        glucose chewable tablet 16 g  16 g Oral PRN Citlali Bridges PA-C        glucose chewable tablet 24 g  24 g Oral PRN Citlali Bridges PA-C        heparin (porcine) injection 5,000  Units  5,000 Units Subcutaneous Q8H Mandy Crespo NP   5,000 Units at 03/10/18 0507    hydrocortisone sodium succinate injection 50 mg  50 mg Intravenous Q8H Seda Camacho NP        isavuconazonium sulfate 372 mg in sodium chloride 0.9% 250 mL   Intravenous Q24H Nathanael Medina  mL/hr at 02/28/18 0101      levetiracetam oral soln Soln 500 mg  500 mg Oral BID Seda Camacho NP   500 mg at 03/10/18 1008    levothyroxine tablet 75 mcg  75 mcg Oral Before breakfast Shereen Mcguire DNP   75 mcg at 03/10/18 0507    metronidazole IVPB 500 mg  500 mg Intravenous Q8H Mandy Crespo  mL/hr at 03/10/18 0800 500 mg at 03/10/18 0800    multivitamin tablet 1 tablet  1 tablet Oral Daily Citlali Bridges PA-C   1 tablet at 03/08/18 0825    naloxone 0.4 mg/mL injection 0.2 mg  0.2 mg Intravenous PRN Geronimo Vera PA-C        omeprazole capsule 40 mg  40 mg Oral QAM Seda T SAMREEN Camacho   40 mg at 03/10/18 0802    ondansetron disintegrating tablet 8 mg  8 mg Oral Q6H PRN Shereen Mcguire DNP   8 mg at 01/19/18 2052    ondansetron injection 4 mg  4 mg Intravenous Q6H PRN Shereen Mcguire DNP   4 mg at 03/10/18 1008    oxyCODONE immediate release tablet 5 mg  5 mg Oral Q6H PRN Mandy Crespo NP   5 mg at 03/10/18 0838    polyethylene glycol packet 17 g  17 g Oral BID Seda Camacho NP   Stopped at 02/12/18 2100    prochlorperazine injection Soln 10 mg  10 mg Intravenous Q6H PRN Shereen Mcguire DNP   10 mg at 03/10/18 0755    psyllium husk (aspartame) 3.4 gram PwPk 1 packet  3.4 g Oral BID Seda T Doug NP   Stopped at 02/12/18 2100    simethicone chewable tablet 80 mg  1 tablet Oral TID PRN Geronimo Vera PA-C        sodium chloride 0.9% flush 3 mL  3 mL Intravenous PRN Seb Rosario MD        sodium chloride 0.9% flush 3 mL  3 mL Intravenous PRN Singh Mcpherson MD        TPN ADULT CENTRAL LINE CUSTOM   Intravenous Continuous Seda Camacho NP 48 mL/hr at 03/09/18 2215      TPN ADULT  "CENTRAL LINE CUSTOM   Intravenous Continuous Seda SIENNA Camacho, SAMREEN        ursodiol oral suspension (conc: 60 mg/mL) 300 mg  300 mg Oral BID Geronimo Vera PA-C   300 mg at 03/10/18 0801       Review of Systems   Constitutional: Negative for activity change.   HENT: Negative for ear pain.    Eyes: Negative for pain.   Respiratory: Negative for chest tightness.    Cardiovascular: Positive for chest pain.   Gastrointestinal: Negative for abdominal pain.   Musculoskeletal: Negative for arthralgias.     Objective:     Vital Signs (Most Recent):  Temp: 100.1 °F (37.8 °C) (03/10/18 1151)  Pulse: 100 (03/10/18 1151)  Resp: 18 (03/10/18 1151)  BP: (!) 140/77 (03/10/18 1151)  SpO2: 97 % (03/10/18 1151) Vital Signs (24h Range):  Temp:  [98.1 °F (36.7 °C)-101.1 °F (38.4 °C)] 100.1 °F (37.8 °C)  Pulse:  [] 100  Resp:  [15-18] 18  SpO2:  [94 %-99 %] 97 %  BP: (123-146)/(64-81) 140/77     Weight: 74.2 kg (163 lb 9.3 oz)  Body mass index is 26.4 kg/m².    Physical Exam   Constitutional: He appears well-developed and well-nourished.   HENT:   Head: Atraumatic.   Eyes: Conjunctivae and EOM are normal.   Pulmonary/Chest: Effort normal.   Abdominal: He exhibits no distension.   Psychiatric: He has a normal mood and affect. His behavior is normal.     Awake, alert.  Oriented except to month ("Feb").  Tremulous voice and mild postural tremor in BUEs.  EOMF, VFF, facial movements symmetric, power nl, FNF nl, sensation intact.  Gait is steady but toe walks.  NEUROLOGICAL EXAMINATION:     CRANIAL NERVES     CN III, IV, VI   Extraocular motions are normal.       Significant Labs:   Blood Culture:   Recent Labs  Lab 03/08/18  1648 03/08/18  1650   LABBLOO No Growth to date  No Growth to date No Growth to date  No Growth to date     CBC:   Recent Labs  Lab 03/09/18  0500 03/10/18  0524   WBC 8.13 7.81   HGB 7.5* 7.7*   HCT 22.2* 23.2*    188     CMP:   Recent Labs  Lab 03/09/18  0500 03/10/18  0524   GLU 85 102    137 "   K 3.5 3.7   CL 99 105   CO2 26 23   BUN 57* 42*   CREATININE 3.9* 3.2*   CALCIUM 9.4 9.3   MG 1.9 2.1   PROT 5.3* 5.7*   ALBUMIN 1.8* 1.9*   BILITOT 2.0* 2.2*   ALKPHOS 142* 137*   AST 41* 41*   ALT 8* 10   ANIONGAP 11 9   EGFRNONAA 19.7* 25.0*       Significant Imaging: I have reviewed all pertinent imaging results/findings within the past 24 hours.  I have reviewed and interpreted all pertinent imaging results/findings within the past 24 hours.

## 2018-03-10 NOTE — NURSING
Pt with temp on 101.1. PRN tylenol to be given. Pt states unable to tolerate PO dose as ordered. Per transplant resident on call give 1g IV tylenol x 1. Verbal orders placed. Await dose to be sent from pharmacy.

## 2018-03-10 NOTE — ASSESSMENT & PLAN NOTE
Hx of seizure 11/2/17 described as generalized convulsions while on prograf. Started on Keppra 500mg BID.   AMS with speech change, confusion and increased lethargy since 3/1 after prograf was restarted 2/24/18.     3/10/18-mental status unchanged. No clinical events concerning for seizure     -most recent levetiracetam level was on 2/26/18 (29.5) low/normal  -continue Keppra 500 mg BID and routine EEG report pending

## 2018-03-10 NOTE — PROGRESS NOTES
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+, steroid induction, on maintenance tacro/MMF/pred; c/b seizures, LAURA, superificial wound infection s/p wound vac to [facility] through 1/12, and several recent admissions on 11/24 and 12/3 with culture negative peritonitis with peak ascites WBC ~5k due to suspected indolent biliary leakage s/p sphincterotomy/biliary stent placement over CBD stricture on 12/6 ERCP vs another IA source) who was admitted on 1/11/2017 with acute onset fevers and acute on chronic N/V/abdominal pain (RLQ worst) and was found to have a loculated RLQ collection on imaging (not safe for percutaneous drainage), for which, after failure with more conservative treatments, he ultimately underwent laparoscopic drainage on 2/7/2018 (with dark brown fluid drained, culture negative but after extensive antibiotic exposure) but without complete resolution. He achieved defervescence but had persistent N/V/abdominal pain on empiric ertapenem with persistent loculated fluid noted on imaging that is not accessible to percutaneous drainage. 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 More recently he has developed AMS/neurologic symptoms concerning for either a new etiology of fevers or a toxicity of ertapenem (negative MRI, LP pending) -- of note, the fact that his fevers recurred after cessation of ertapenem implies that this agent may still be treated an ESBL organism we cannot culture from his abdomen. He remains tenuous with a course otherwise c/b ongoing ileus and malnutrition.     - would continue empiric vanc/cefepime/flagyl for now but if spiking through these agents by tomorrow after a >72h erta holiday and continues to have no improvement in neurologic issues, would transition to meropenem for ongoing empiric ESBL coverage given prior response to this agent  - additional prophylaxis per protocol (valcyte, atovaquone given bactrim allergy, and isavuconazole given complex course)  - await ulcer biopsy results   - would pursue LP - send for cell count/diff, protein, glucose, bacterial/fungal/AFB stain/culture, HSV PCR, CMV PCR, EBV PCR (please obtain quantitative PCR which is a send-out lab and not the in house qualitative PCR), HHV6 PCR, crypto antigen, TB PCR, and toxo PCR -- please reserve any remaining CSF and freeze              Anticipated Disposition: pending improvement    Thank you for your consult. I will follow-up with patient. Please contact us if you have any additional questions.     Melanie Bergman MD  Transplant ID Attending  [phone]    Melanie Bergman MD  Infectious Disease  [facility]    Subjective:     Principal Problem:Fever    HPI: No notes on file  Interval History: Fever curve remains worse off ESBL coverage. No improvement in neurologic symptoms today.    Review of Systems   Constitutional: Positive for activity change, appetite change and fatigue. Negative for chills and fever.   HENT: Negative  for congestion and facial swelling.    Eyes: Negative for pain, discharge and visual disturbance.   Respiratory: Negative for cough, chest tightness, shortness of breath and wheezing.    Cardiovascular: Negative for chest pain, palpitations and leg swelling.   Gastrointestinal: Positive for abdominal distention, abdominal pain and nausea. Negative for constipation and vomiting.   Endocrine: Negative.    Genitourinary:        Anuric   Musculoskeletal: Negative for arthralgias, back pain and myalgias.   Skin: Positive for color change and wound. Negative for rash.   Allergic/Immunologic: Positive for immunocompromised state.   Neurological: Negative for dizziness, seizures, weakness and headaches.   Psychiatric/Behavioral: Positive for confusion, decreased concentration and dysphoric mood. The patient is not nervous/anxious.    All other systems reviewed and are negative.      Objective:     Vital Signs (Most Recent):  Temp: 99.2 °F (37.3 °C) (02/17/18 1542)  Pulse: 90 (02/17/18 1542)  Resp: 16 (02/17/18 1542)  BP: 135/85 (02/17/18 1542)  SpO2: 95 % (02/17/18 1542) Vital Signs (24h Range):  Temp:  [98.6 °F (37 °C)-101.2 °F (38.4 °C)] 99.2 °F (37.3 °C)  Pulse:  [84-92] 90  Resp:  [16-20] 16  SpO2:  [92 %-96 %] 95 %  BP: (128-137)/(75-85) 135/85     Weight: 60.2 kg (132 lb 11.5 oz)  Body mass index is 21.42 kg/m².    Estimated Creatinine Clearance: 26.8 mL/min (A) (based on SCr of 3.5 mg/dL (H)).    Physical Exam   Constitutional: He is oriented to person, place, and time. He appears well-developed. No distress.   Temporal and distal extremity muscle wasting   HENT:   Head: Normocephalic and atraumatic.   Mouth/Throat: No oropharyngeal exudate.   dylan tube   Eyes: EOM are normal. Pupils are equal, round, and reactive to light. No scleral icterus.   Neck: Normal range of motion. Neck supple. No JVD present. No thyromegaly present.   Cardiovascular: Normal rate, regular rhythm, normal heart sounds and intact distal pulses.     No murmur heard.  Pulmonary/Chest: Effort normal. No respiratory distress. He has no wheezes. He exhibits no tenderness.   Diminished to RLL   Abdominal: Soft. Bowel sounds are normal. He exhibits ascites. He exhibits no distension. There is tenderness. There is no rebound and no guarding.   Dressing to chevron.  Wd vac removed 1/12/18  Dependent edema present R flank; surrounding erythema   Musculoskeletal: Normal range of motion. He exhibits no tenderness. Edema: 2+ LE edema.   Neurological: He is alert and oriented to person, place, and time. He has normal reflexes.   Skin: Skin is warm and dry. He is not diaphoretic. No erythema.   Psychiatric: His mood appears not anxious.   Nursing note and vitals reviewed.      Significant Labs:   CBC:     Recent Labs  Lab 02/16/18  0436 02/17/18  0343   WBC 2.85* 2.23*   HGB 8.8* 7.8*   HCT 26.8* 23.9*    134*     CMP:     Recent Labs  Lab 02/16/18 0436 02/17/18  0343   * 137   K 4.0 3.8    105   CO2 23 23   GLU 84 81   BUN 23* 13   CREATININE 5.1* 3.5*   CALCIUM 9.5 8.8   PROT 5.6* 5.1*   ALBUMIN 2.0* 1.8*   BILITOT 2.2* 2.1*   ALKPHOS 125 111   AST 25 18   ALT <5* <5*   ANIONGAP 11 9   EGFRNONAA 14.2* 22.4*       Significant Imaging: I have reviewed all pertinent imaging results/findings within the past 24 hours.     CT CAP:   Post surgical changes prior hepatic transplant.  Percutaneous surgical drain with notable decrease in size of fluid collection in lower abdomen.  Small amount of fluid tracking elsewhere within the abdomen and pelvis.  Large volume right pleural fluid, increased in size since prior exam, with associated right lower lobe atelectasis.  Diffuse body wall edema.  Small pericardial effusion.    Microbiology:  1/11 blood cx: negative  1/11 pleural fluid cx: negative  1/11 ascites cx: negative  1/17 blood cx: negative  1/18 blood cx: negative  1/19 pleural fluid cx: NGTD  1/19 ascites cx: NGTD  2/1 RVP negative  2/7 OR cx: NGTD  2/11  blood cx: negative  2/15 blood cx: negative  2/16 pleural fluid cx: negative

## 2018-03-10 NOTE — PROGRESS NOTES
[facility]  Infectious Disease  Progress Note    Patient Name: Jhonny Diana  MRN: [id number]  Admission Date: 1/11/2018  Length of Stay: 57 days  Attending Physician: Nathanael Medina MD  Primary Care Provider: Primary Doctor No    Isolation Status: No active isolations  Assessment/Plan:      Fever    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, CMV D+/R+, steroid induction, on maintenance tacro/MMF/pred; c/b seizures, LAURA, superificial wound infection s/p wound vac to [facility] through 1/12, and several recent admissions on 11/24 and 12/3 with culture negative peritonitis with peak ascites WBC ~5k due to suspected indolent biliary leakage s/p sphincterotomy/biliary stent placement over CBD stricture on 12/6 ERCP vs another IA source) who was admitted on 1/11/2017 with acute onset fevers and acute on chronic N/V/abdominal pain (RLQ worst) and was found to have a loculated RLQ collection on imaging (not safe for percutaneous drainage), for which, after failure with more conservative treatments, he ultimately underwent laparoscopic drainage on 2/7/2018 (with dark brown fluid drained, culture negative but after extensive antibiotic exposure) but without complete resolution. He achieved defervescence but had persistent N/V/abdominal pain on empiric ertapenem with persistent loculated fluid noted on imaging that is not accessible to percutaneous drainage. 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 More recently he has developed AMS/neurologic symptoms concerning for either a new etiology of fevers or a toxicity of ertapenem (negative MRI, LP pending) -- of note, the fact that his fevers recurred after cessation of ertapenem implies that this agent may still be treated an ESBL organism we cannot culture from his abdomen. He remains tenuous with a course otherwise c/b ongoing ileus and malnutrition.     - would continue empiric vanc/cefepime/flagyl for now but if spiking through these agents, would consider transition to meropenem for ESBL coverage  - additional prophylaxis per protocol (valcyte, atovaquone given bactrim allergy, and isavuconazole given complex course)  - await ulcer biopsy results and ERCP results from today  - agree with LP - send for cell count/diff, protein, glucose, bacterial/fungal/AFB stain/culture, HSV PCR, CMV PCR, EBV PCR, HHV6 PCR, crypto antigen, TB PCR, and toxo PCR              Anticipated Disposition: pending improvement    Thank you for your consult. I will follow-up with patient. Please contact us if you have any additional questions.     Melanie Bergman MD  Transplant ID Attending  [phone]    Melanie Bergman MD  Infectious Disease  [facility]    Subjective:     Principal Problem:Fever    HPI: No notes on file  Interval History: Fever curve remains worse off ESBL coverage. No improvement in neurologic symptoms yet.    Review of Systems   Constitutional: Positive for activity change, appetite change and fatigue. Negative for chills and fever.   HENT: Negative for congestion and facial swelling.    Eyes: Negative for pain, discharge and visual disturbance.   Respiratory: Negative for cough, chest tightness, shortness of breath and wheezing.    Cardiovascular: Negative for chest pain, palpitations and leg swelling.    Gastrointestinal: Positive for abdominal distention, abdominal pain and nausea. Negative for constipation and vomiting.   Endocrine: Negative.    Genitourinary:        Anuric   Musculoskeletal: Negative for arthralgias, back pain and myalgias.   Skin: Positive for color change and wound. Negative for rash.   Allergic/Immunologic: Positive for immunocompromised state.   Neurological: Negative for dizziness, seizures, weakness and headaches.   Psychiatric/Behavioral: Positive for decreased concentration and dysphoric mood. Negative for confusion. The patient is not nervous/anxious.    All other systems reviewed and are negative.      Objective:     Vital Signs (Most Recent):  Temp: 99.2 °F (37.3 °C) (02/17/18 1542)  Pulse: 90 (02/17/18 1542)  Resp: 16 (02/17/18 1542)  BP: 135/85 (02/17/18 1542)  SpO2: 95 % (02/17/18 1542) Vital Signs (24h Range):  Temp:  [98.6 °F (37 °C)-101.2 °F (38.4 °C)] 99.2 °F (37.3 °C)  Pulse:  [84-92] 90  Resp:  [16-20] 16  SpO2:  [92 %-96 %] 95 %  BP: (128-137)/(75-85) 135/85     Weight: 60.2 kg (132 lb 11.5 oz)  Body mass index is 21.42 kg/m².    Estimated Creatinine Clearance: 26.8 mL/min (A) (based on SCr of 3.5 mg/dL (H)).    Physical Exam   Constitutional: He is oriented to person, place, and time. He appears well-developed. No distress.   Temporal and distal extremity muscle wasting   HENT:   Head: Normocephalic and atraumatic.   Mouth/Throat: No oropharyngeal exudate.   dylan tube   Eyes: EOM are normal. Pupils are equal, round, and reactive to light. No scleral icterus.   Neck: Normal range of motion. Neck supple. No JVD present. No thyromegaly present.   Cardiovascular: Normal rate, regular rhythm, normal heart sounds and intact distal pulses.    No murmur heard.  Pulmonary/Chest: Effort normal. No respiratory distress. He has no wheezes. He exhibits no tenderness.   Diminished to RLL   Abdominal: Soft. Bowel sounds are normal. He exhibits ascites. He exhibits no distension. There is  tenderness. There is no rebound and no guarding.   Dressing to chevron.  Wd vac removed 1/12/18  Dependent edema present R flank; surrounding erythema   Musculoskeletal: Normal range of motion. He exhibits no tenderness. Edema: 2+ LE edema.   Neurological: He is alert and oriented to person, place, and time. He has normal reflexes.   Skin: Skin is warm and dry. He is not diaphoretic. No erythema.   Psychiatric: His mood appears not anxious.   Nursing note and vitals reviewed.      Significant Labs:   CBC:     Recent Labs  Lab 02/16/18 0436 02/17/18  0343   WBC 2.85* 2.23*   HGB 8.8* 7.8*   HCT 26.8* 23.9*    134*     CMP:     Recent Labs  Lab 02/16/18 0436 02/17/18  0343   * 137   K 4.0 3.8    105   CO2 23 23   GLU 84 81   BUN 23* 13   CREATININE 5.1* 3.5*   CALCIUM 9.5 8.8   PROT 5.6* 5.1*   ALBUMIN 2.0* 1.8*   BILITOT 2.2* 2.1*   ALKPHOS 125 111   AST 25 18   ALT <5* <5*   ANIONGAP 11 9   EGFRNONAA 14.2* 22.4*       Significant Imaging: I have reviewed all pertinent imaging results/findings within the past 24 hours.     CT CAP:   Post surgical changes prior hepatic transplant.  Percutaneous surgical drain with notable decrease in size of fluid collection in lower abdomen.  Small amount of fluid tracking elsewhere within the abdomen and pelvis.  Large volume right pleural fluid, increased in size since prior exam, with associated right lower lobe atelectasis.  Diffuse body wall edema.  Small pericardial effusion.    Microbiology:  1/11 blood cx: negative  1/11 pleural fluid cx: negative  1/11 ascites cx: negative  1/17 blood cx: negative  1/18 blood cx: negative  1/19 pleural fluid cx: NGTD  1/19 ascites cx: NGTD  2/1 RVP negative  2/7 OR cx: NGTD  2/11 blood cx: negative  2/15 blood cx: negative  2/16 pleural fluid cx: negative

## 2018-03-10 NOTE — CARE UPDATE
RN Proactive Rounding Note  Time of Visit: 09:43    Admit Date: 2018  LOS: 58  Code Status: Full Code   Date of Visit: 03/10/2018  : 1990  Age: 27 y.o.  Sex: male  Bed: 8086/8086 A:   MRN: [id number]  Was the patient discharged from an ICU this admission? no  Was the patient discharged from a PACU within last 24 hours? no  Did the patient receive conscious sedation/general anesthesia in last 24 hours? no  Was the patient in the ED within the past 24 hours? no  Was the patient started on NIPPV within the past 24 hours? no  Attending Physician: Nathanael Medina MD  Primary Service: [facility]      ASSESSMENT:     Modified Early Warning Score (MEWS): 2  Abnormal Vital Signs: Temp 100.9   Clinical Issues: Circulatory     INTERVENTIONS/ RECOMMENDATIONS:     Patient seen on proactive rounds for follow up.   MEWs currently 2 - Temp 100.9, . Patient altered.   Mom at bedside.   Patient stable. No acute distress noted. Vitals stable.   Would recommend tylenol if temp >101.   ID following with rec's.       PHYSICIAN ESCALATION:     Yes/No no    Orders received and case discussed with   n/a     Disposition: TSU     FOLLOW-UP/CONTINGENCY:   Will follow MEWs     Call back the Rapid Response Nurse at x [phone]  for additional questions or concerns

## 2018-03-10 NOTE — PROGRESS NOTES
[facility]  Neurology  Progress Note    Patient Name: Jhonny Diana  MRN: [id number]  Admission Date: 1/11/2018  Hospital Length of Stay: 58 days  Code Status: Full Code   Attending Provider: Nathanael Medina MD  Primary Care Physician: Primary Doctor No   Principal Problem:Fever      Subjective:     Interval History:  Continues to have waxing and waning confusion    Current Neurological Medications:     Current Facility-Administered Medications   Medication Dose Route Frequency Provider Last Rate Last Dose    0.9%  NaCl infusion (for blood administration)   Intravenous Q24H PRN Jin Perez NP        0.9%  NaCl infusion   Intravenous PRN Win Gentile  mL/hr at 03/09/18 0800      acetaminophen tablet 650 mg  650 mg Oral Q6H PRN Shereen Mcguire DNP   650 mg at 03/09/18 0034    albuterol-ipratropium 2.5mg-0.5mg/3mL nebulizer solution 3 mL  3 mL Nebulization Q4H PRN Sean Patel MD        bisacodyl EC tablet 10 mg  10 mg Oral Daily Fransisca Thomas PA-C   10 mg at 03/10/18 0759    bisacodyl suppository 10 mg  10 mg Rectal Daily PRN Margarita Swain MD   10 mg at 02/27/18 2219    ceFEPIme injection 1 g  1 g Intravenous Q12H Seda Camacho NP   1 g at 03/10/18 0508    dextrose 50% injection 12.5 g  12.5 g Intravenous PRN Citlali Bridges PA-C   12.5 g at 02/19/18 0835    dextrose 50% injection 25 g  25 g Intravenous PRN Citlali Bridges PA-C        diphenhydrAMINE-zinc acetate 1-0.1% cream   Topical (Top) TID PRN Shereen Mcguire DNP        docusate sodium capsule 100 mg  100 mg Oral BID Seda Camacho NP   100 mg at 03/08/18 2149    epoetin maurisio injection 10,000 Units  10,000 Units Intravenous Every Mon, Wed, Fri Derrick Turner NP   10,000 Units at 03/09/18 0914    ergocalciferol capsule 50,000 Units  50,000 Units Oral Q7 Days Mandy Crespo NP   50,000 Units at 03/08/18 1721    fat emulsion 20% infusion 250 mL  250 mL Intravenous Once Seda Camacho,  NP        glucagon (human recombinant) injection 1 mg  1 mg Intramuscular PRN Citlali Bridges PA-C        glucose chewable tablet 16 g  16 g Oral PRN Citlali Bridges PA-C        glucose chewable tablet 24 g  24 g Oral PRN Citlali Bridges PA-C        heparin (porcine) injection 5,000 Units  5,000 Units Subcutaneous Q8H Mandy Crespo NP   5,000 Units at 03/10/18 0507    hydrocortisone sodium succinate injection 50 mg  50 mg Intravenous Q8H Seda Camacho NP        isavuconazonium sulfate 372 mg in sodium chloride 0.9% 250 mL   Intravenous Q24H Nathanael Medina  mL/hr at 02/28/18 0101      levetiracetam oral soln Soln 500 mg  500 mg Oral BID Seda Camacho, NP   500 mg at 03/10/18 1008    levothyroxine tablet 75 mcg  75 mcg Oral Before breakfast Shereen Mcguire DNP   75 mcg at 03/10/18 0507    metronidazole IVPB 500 mg  500 mg Intravenous Q8H Mandy Crespo  mL/hr at 03/10/18 0800 500 mg at 03/10/18 0800    multivitamin tablet 1 tablet  1 tablet Oral Daily Citlali Bridges PA-C   1 tablet at 03/08/18 0825    naloxone 0.4 mg/mL injection 0.2 mg  0.2 mg Intravenous PRN Geronimo Vera PA-C        omeprazole capsule 40 mg  40 mg Oral QAM Seda Camacho NP   40 mg at 03/10/18 0802    ondansetron disintegrating tablet 8 mg  8 mg Oral Q6H PRN Shereen Mcguire DNP   8 mg at 01/19/18 2052    ondansetron injection 4 mg  4 mg Intravenous Q6H PRN Shereen Mcguire DNP   4 mg at 03/10/18 1008    oxyCODONE immediate release tablet 5 mg  5 mg Oral Q6H PRN Mandy Crespo NP   5 mg at 03/10/18 0838    polyethylene glycol packet 17 g  17 g Oral BID Seda Camacho NP   Stopped at 02/12/18 2100    prochlorperazine injection Soln 10 mg  10 mg Intravenous Q6H PRN Shereen Mcguire DNP   10 mg at 03/10/18 0755    psyllium husk (aspartame) 3.4 gram PwPk 1 packet  3.4 g Oral BID Seda Camacho NP   Stopped at 02/12/18 2100    simethicone chewable tablet 80 mg  1 tablet Oral TID GREG LIEBERMAN  "STAR Vera        sodium chloride 0.9% flush 3 mL  3 mL Intravenous PRN Seb Rosario MD        sodium chloride 0.9% flush 3 mL  3 mL Intravenous PRN Singh Mcpherson MD        TPN ADULT CENTRAL LINE CUSTOM   Intravenous Continuous Seda T Voltz, NP 48 mL/hr at 03/09/18 2217      TPN ADULT CENTRAL LINE CUSTOM   Intravenous Continuous Seda T Voltz, NP        ursodiol oral suspension (conc: 60 mg/mL) 300 mg  300 mg Oral BID Geronimo Vera PA-C   300 mg at 03/10/18 0801       Review of Systems   Constitutional: Negative for activity change.   HENT: Negative for ear pain.    Eyes: Negative for pain.   Respiratory: Negative for chest tightness.    Cardiovascular: Positive for chest pain.   Gastrointestinal: Negative for abdominal pain.   Musculoskeletal: Negative for arthralgias.     Objective:     Vital Signs (Most Recent):  Temp: 100.1 °F (37.8 °C) (03/10/18 1151)  Pulse: 100 (03/10/18 1151)  Resp: 18 (03/10/18 1151)  BP: (!) 140/77 (03/10/18 1151)  SpO2: 97 % (03/10/18 1151) Vital Signs (24h Range):  Temp:  [98.1 °F (36.7 °C)-101.1 °F (38.4 °C)] 100.1 °F (37.8 °C)  Pulse:  [] 100  Resp:  [15-18] 18  SpO2:  [94 %-99 %] 97 %  BP: (123-146)/(64-81) 140/77     Weight: 74.2 kg (163 lb 9.3 oz)  Body mass index is 26.4 kg/m².    Physical Exam   Constitutional: He appears well-developed and well-nourished.   HENT:   Head: Atraumatic.   Eyes: Conjunctivae and EOM are normal.   Pulmonary/Chest: Effort normal.   Abdominal: He exhibits no distension.   Psychiatric: He has a normal mood and affect. His behavior is normal.     Awake, alert.  Oriented except to month ("Feb").  Tremulous voice and mild postural tremor in BUEs.  EOMF, VFF, facial movements symmetric, power nl, FNF nl, sensation intact.  Gait is steady but toe walks.  NEUROLOGICAL EXAMINATION:     CRANIAL NERVES     CN III, IV, VI   Extraocular motions are normal.       Significant Labs:   Blood Culture:   Recent Labs  Lab 03/08/18  1648 " 03/08/18  1650   LABBLOO No Growth to date  No Growth to date No Growth to date  No Growth to date     CBC:   Recent Labs  Lab 03/09/18  0500 03/10/18  0524   WBC 8.13 7.81   HGB 7.5* 7.7*   HCT 22.2* 23.2*    188     CMP:   Recent Labs  Lab 03/09/18  0500 03/10/18  0524   GLU 85 102    137   K 3.5 3.7   CL 99 105   CO2 26 23   BUN 57* 42*   CREATININE 3.9* 3.2*   CALCIUM 9.4 9.3   MG 1.9 2.1   PROT 5.3* 5.7*   ALBUMIN 1.8* 1.9*   BILITOT 2.0* 2.2*   ALKPHOS 142* 137*   AST 41* 41*   ALT 8* 10   ANIONGAP 11 9   EGFRNONAA 19.7* 25.0*       Significant Imaging: I have reviewed all pertinent imaging results/findings within the past 24 hours.  I have reviewed and interpreted all pertinent imaging results/findings within the past 24 hours.    Assessment and Plan:     Encephalopathy    Slurred and mumbled speech reported since 3/1 with increased lethargy, confusion, arm movements while sleeping and gait difficulty.   Complicated hospital course with ATN requiring dialysis, superficial wound infection with wound vac, multiple admissions for fevers, peritonitis, persistent ascites, biliary strictures requiring ERCP and biliary stenting, pleural effusion s/p thoracentesis and malnutrition on TPN.    On exam, patient has decreased attention and concentration. Low and mumbled speech, but able to repeat phrases without difficulty or apparent receptive aphasia. Suspect mental status change on 3/1 is due to recent infection with Abx and possible s/e from prograf complicated by ICU delirium after being admitted for 55 days. CT head and MRI brain without contrast were unremarkable for acute intracranial pathology.     ->continue delirium precautions with good sleep hygiene and regulation of sleep/wake cycle. Limit interruptions in the middle of the night.   ->encouraged to limit sedating medications and narcotics as able  - although index of suspicion of CNS infection is low, agree with plan for LP        Seizure     Hx of seizure 11/2/17 described as generalized convulsions while on prograf. Started on Keppra 500mg BID.   AMS with speech change, confusion and increased lethargy since 3/1 after prograf was restarted 2/24/18.     3/10/18-mental status unchanged. No clinical events concerning for seizure     -most recent levetiracetam level was on 2/26/18 (29.5) low/normal  -continue Keppra 500 mg BID and routine EEG report pending         At risk for opportunistic infections    Immunosuppressed s/p liver transplant 10/2017   -ID following        Long-term use of immunosuppressant medication    Low threshold for LP to r/o CNS infection if mental status deteriorates   -ID following, appreciate assitance        Acute renal failure with tubular necrosis    Nephrology following  dialysis for metabolic clearance and volume management             VTE Risk Mitigation         Ordered     heparin (porcine) injection 5,000 Units  Every 8 hours     Route:  Subcutaneous        03/08/18 1622     Medium Risk of VTE  Once      01/11/18 0351     Place sequential compression device  Until discontinued      01/11/18 0351          Carl Pablo MD  Neurology  [facility]

## 2018-03-10 NOTE — TREATMENT PLAN
AES Post Procedure Note    Patient s/p ERCP yesterday:    Impression:   - Two stents were removed from the biliary tree.                        - Choledocholithiasis was found. Complete removal                         was accomplished by balloon extraction.                        - A mild insignificant biliary stricture was found                         in the post-transplant anastomosis. The stricture                         was post-surgical. Unlikely to be the cause of this                         patient's fever.                        - One covered metal stent was placed across the                         stricture given that patient has fever.    Patient remains confused today. Did have another fever at 10pm yesterday. Abdomen mildly distended and tender (baseline). Continue supportive care.    Bladimir Boland MD PGY-IV  Gastroenterology Fellow  [facility]  P [phone]

## 2018-03-11 LAB
ALBUMIN SERPL BCP-MCNC: 1.8 G/DL
ALP SERPL-CCNC: 128 U/L
ALT SERPL W/O P-5'-P-CCNC: 7 U/L
ANION GAP SERPL CALC-SCNC: 11 MMOL/L
AST SERPL-CCNC: 42 U/L
BACTERIA BLD CULT: NORMAL
BACTERIA BLD CULT: NORMAL
BASOPHILS # BLD AUTO: 0.03 K/UL
BASOPHILS NFR BLD: 0.4 %
BILIRUB SERPL-MCNC: 1.8 MG/DL
BUN SERPL-MCNC: 60 MG/DL
CALCIUM SERPL-MCNC: 9.6 MG/DL
CHLORIDE SERPL-SCNC: 105 MMOL/L
CHOLEST SERPL-MCNC: 121 MG/DL
CHOLEST/HDLC SERPL: 10.1 {RATIO}
CO2 SERPL-SCNC: 21 MMOL/L
CREAT SERPL-MCNC: 3.9 MG/DL
CYCLOSPORINE BLD LC/MS/MS-MCNC: 136 NG/ML
DIFFERENTIAL METHOD: ABNORMAL
EOSINOPHIL # BLD AUTO: 0 K/UL
EOSINOPHIL NFR BLD: 0 %
ERYTHROCYTE [DISTWIDTH] IN BLOOD BY AUTOMATED COUNT: 17.7 %
EST. GFR  (AFRICAN AMERICAN): 22.7 ML/MIN/1.73 M^2
EST. GFR  (NON AFRICAN AMERICAN): 19.7 ML/MIN/1.73 M^2
GLUCOSE SERPL-MCNC: 101 MG/DL
HCT VFR BLD AUTO: 23.5 %
HDLC SERPL-MCNC: 12 MG/DL
HDLC SERPL: 9.9 %
HGB BLD-MCNC: 7.7 G/DL
IMM GRANULOCYTES # BLD AUTO: 0.35 K/UL
IMM GRANULOCYTES NFR BLD AUTO: 4.1 %
LDLC SERPL CALC-MCNC: 49.6 MG/DL
LYMPHOCYTES # BLD AUTO: 1.1 K/UL
LYMPHOCYTES NFR BLD: 12.7 %
MAGNESIUM SERPL-MCNC: 2.1 MG/DL
MCH RBC QN AUTO: 31.2 PG
MCHC RBC AUTO-ENTMCNC: 32.8 G/DL
MCV RBC AUTO: 95 FL
MONOCYTES # BLD AUTO: 1 K/UL
MONOCYTES NFR BLD: 11.8 %
NEUTROPHILS # BLD AUTO: 6 K/UL
NEUTROPHILS NFR BLD: 71 %
NONHDLC SERPL-MCNC: 109 MG/DL
NRBC BLD-RTO: 0 /100 WBC
PHOSPHATE SERPL-MCNC: 2.8 MG/DL
PLATELET # BLD AUTO: 186 K/UL
PMV BLD AUTO: 11.1 FL
POTASSIUM SERPL-SCNC: 3.9 MMOL/L
PROT SERPL-MCNC: 5.7 G/DL
RBC # BLD AUTO: 2.47 M/UL
SODIUM SERPL-SCNC: 137 MMOL/L
TRIGL SERPL-MCNC: 297 MG/DL
WBC # BLD AUTO: 8.48 K/UL

## 2018-03-11 PROCEDURE — 85025 COMPLETE CBC W/AUTO DIFF WBC: CPT

## 2018-03-11 PROCEDURE — 25000003 PHARM REV CODE 250: Performed by: PHYSICIAN ASSISTANT

## 2018-03-11 PROCEDURE — 20600001 HC STEP DOWN PRIVATE ROOM

## 2018-03-11 PROCEDURE — 25000003 PHARM REV CODE 250: Performed by: NURSE PRACTITIONER

## 2018-03-11 PROCEDURE — 63600175 PHARM REV CODE 636 W HCPCS: Mod: JG | Performed by: SURGERY

## 2018-03-11 PROCEDURE — 25000003 PHARM REV CODE 250: Performed by: SURGERY

## 2018-03-11 PROCEDURE — 99233 SBSQ HOSP IP/OBS HIGH 50: CPT | Mod: ,,, | Performed by: INTERNAL MEDICINE

## 2018-03-11 PROCEDURE — A4217 STERILE WATER/SALINE, 500 ML: HCPCS | Performed by: NURSE PRACTITIONER

## 2018-03-11 PROCEDURE — 83735 ASSAY OF MAGNESIUM: CPT

## 2018-03-11 PROCEDURE — 36415 COLL VENOUS BLD VENIPUNCTURE: CPT

## 2018-03-11 PROCEDURE — 63600175 PHARM REV CODE 636 W HCPCS: Performed by: NURSE PRACTITIONER

## 2018-03-11 PROCEDURE — 94664 DEMO&/EVAL PT USE INHALER: CPT

## 2018-03-11 PROCEDURE — 84100 ASSAY OF PHOSPHORUS: CPT

## 2018-03-11 PROCEDURE — S0030 INJECTION, METRONIDAZOLE: HCPCS | Performed by: NURSE PRACTITIONER

## 2018-03-11 PROCEDURE — 80158 DRUG ASSAY CYCLOSPORINE: CPT

## 2018-03-11 PROCEDURE — 80053 COMPREHEN METABOLIC PANEL: CPT

## 2018-03-11 PROCEDURE — 99233 SBSQ HOSP IP/OBS HIGH 50: CPT | Mod: ,,, | Performed by: NURSE PRACTITIONER

## 2018-03-11 PROCEDURE — 80061 LIPID PANEL: CPT

## 2018-03-11 RX ORDER — SIROLIMUS 1 MG/1
1 TABLET, FILM COATED ORAL DAILY
Status: DISCONTINUED | OUTPATIENT
Start: 2018-03-11 | End: 2018-03-13

## 2018-03-11 RX ADMIN — OMEPRAZOLE 40 MG: 40 CAPSULE, DELAYED RELEASE ORAL at 07:03

## 2018-03-11 RX ADMIN — OXYCODONE HYDROCHLORIDE 5 MG: 5 TABLET ORAL at 12:03

## 2018-03-11 RX ADMIN — BISACODYL 10 MG: 5 TABLET, COATED ORAL at 08:03

## 2018-03-11 RX ADMIN — LEVETIRACETAM 500 MG: 100 SOLUTION ORAL at 10:03

## 2018-03-11 RX ADMIN — SIROLIMUS 1 MG: 1 TABLET, SUGAR COATED ORAL at 09:03

## 2018-03-11 RX ADMIN — URSODIOL 300 MG: 300 CAPSULE ORAL at 10:03

## 2018-03-11 RX ADMIN — LEVOTHYROXINE SODIUM 75 MCG: 75 TABLET ORAL at 05:03

## 2018-03-11 RX ADMIN — METRONIDAZOLE 500 MG: 500 INJECTION, SOLUTION INTRAVENOUS at 12:03

## 2018-03-11 RX ADMIN — METRONIDAZOLE 500 MG: 500 INJECTION, SOLUTION INTRAVENOUS at 08:03

## 2018-03-11 RX ADMIN — HYDROCORTISONE SODIUM SUCCINATE 50 MG: 100 INJECTION, POWDER, FOR SOLUTION INTRAMUSCULAR; INTRAVENOUS at 10:03

## 2018-03-11 RX ADMIN — HYDROCORTISONE SODIUM SUCCINATE 50 MG: 100 INJECTION, POWDER, FOR SOLUTION INTRAMUSCULAR; INTRAVENOUS at 02:03

## 2018-03-11 RX ADMIN — ONDANSETRON HYDROCHLORIDE 4 MG: 2 INJECTION, SOLUTION INTRAMUSCULAR; INTRAVENOUS at 05:03

## 2018-03-11 RX ADMIN — HYDROCORTISONE SODIUM SUCCINATE 50 MG: 100 INJECTION, POWDER, FOR SOLUTION INTRAMUSCULAR; INTRAVENOUS at 05:03

## 2018-03-11 RX ADMIN — SODIUM CHLORIDE: 9 INJECTION, SOLUTION INTRAVENOUS at 01:03

## 2018-03-11 RX ADMIN — CEFEPIME 1 G: 1 INJECTION, POWDER, FOR SOLUTION INTRAMUSCULAR; INTRAVENOUS at 05:03

## 2018-03-11 RX ADMIN — OXYCODONE HYDROCHLORIDE 5 MG: 5 TABLET ORAL at 05:03

## 2018-03-11 RX ADMIN — METRONIDAZOLE 500 MG: 500 INJECTION, SOLUTION INTRAVENOUS at 05:03

## 2018-03-11 RX ADMIN — URSODIOL 300 MG: 300 CAPSULE ORAL at 08:03

## 2018-03-11 RX ADMIN — OXYCODONE HYDROCHLORIDE 5 MG: 5 TABLET ORAL at 08:03

## 2018-03-11 RX ADMIN — SODIUM CHLORIDE: 234 INJECTION INTRAMUSCULAR; INTRAVENOUS; SUBCUTANEOUS at 10:03

## 2018-03-11 RX ADMIN — ONDANSETRON HYDROCHLORIDE 4 MG: 2 INJECTION, SOLUTION INTRAMUSCULAR; INTRAVENOUS at 08:03

## 2018-03-11 NOTE — PLAN OF CARE
Problem: Patient Care Overview  Goal: Plan of Care Review  Outcome: Ongoing (interventions implemented as appropriate)  Pt is AAOx3.Family at bedside. NAD noted.Standard precautions maintained.  Pt turns independently, pt is aware of bony area and pressure reduction positions Pt remains injury and fall free, non skid footwear donned, call light within reach, personal items within reach, bed in low/locked position, pt able to voice needs all needs voiced have been met at this time.

## 2018-03-11 NOTE — CARE UPDATE
"Reviewed 3/9/18 EEG read  Per Dr. Chauhan's report,  "This is an abnormal EEG due to the finding of a mild,   generalized, non-specific cerebral dysfunction.  No seizures or   epileptiform discharges were recorded."    No new updates to plan as outline in Dr. Pablo's 3/10 progress note.    Ian Roca MD    "

## 2018-03-11 NOTE — SUBJECTIVE & OBJECTIVE
Scheduled Meds:   bisacodyl  10 mg Oral Daily    ceFEPime (MAXIPIME) IVPB  1 g Intravenous Q12H    docusate sodium  100 mg Oral BID    epoetin maurisio (PROCRIT) injection  10,000 Units Intravenous Every Mon, Wed, Fri    ergocalciferol  50,000 Units Oral Q7 Days    fat emulsion 20%  250 mL Intravenous Once    heparin (porcine)  5,000 Units Subcutaneous Q8H    hydrocortisone sodium succinate  50 mg Intravenous Q8H    custom IVPB builder   Intravenous Q24H    levetiracetam oral soln  500 mg Oral BID    levothyroxine  75 mcg Oral Before breakfast    metronidazole  500 mg Intravenous Q8H    multivitamin  1 tablet Oral Daily    omeprazole  40 mg Oral QAM    polyethylene glycol  17 g Oral BID    psyllium husk (aspartame)  3.4 g Oral BID    sirolimus  1 mg Oral Daily    ursodiol  300 mg Oral BID     Continuous Infusions:   TPN ADULT CENTRAL LINE CUSTOM 48 mL/hr at 03/10/18 2219    TPN ADULT CENTRAL LINE CUSTOM       PRN Meds:sodium chloride, sodium chloride 0.9%, acetaminophen, albuterol-ipratropium 2.5mg-0.5mg/3mL, bisacodyl, dextrose 50%, dextrose 50%, diphenhydrAMINE-zinc acetate 1-0.1%, glucagon (human recombinant), glucose, glucose, naloxone, ondansetron, ondansetron, oxyCODONE, prochlorperazine, simethicone, sodium chloride 0.9%, sodium chloride 0.9%    Review of Systems   Unable to perform ROS: Mental status change     Objective:     Vital Signs (Most Recent):  Temp: 98.5 °F (36.9 °C) (03/11/18 0817)  Pulse: 87 (03/11/18 0817)  Resp: 16 (03/11/18 0817)  BP: 129/81 (03/11/18 0817)  SpO2: (!) 94 % (03/11/18 0817) Vital Signs (24h Range):  Temp:  [98.4 °F (36.9 °C)-100.1 °F (37.8 °C)] 98.5 °F (36.9 °C)  Pulse:  [] 87  Resp:  [16-18] 16  SpO2:  [94 %-97 %] 94 %  BP: (129-146)/(77-93) 129/81     Weight: 74.2 kg (163 lb 9.3 oz)  Body mass index is 26.4 kg/m².    Intake/Output - Last 3 Shifts       03/09 0700 - 03/10 0659 03/10 0700 - 03/11 0659 03/11 0700 - 03/12 0659    P.O. 0 0     Other 550       IV Piggyback  300     TPN  885.6     Total Intake(mL/kg) 550 (7.4) 1185.6 (16)     Urine (mL/kg/hr)  0 (0)     Emesis/NG output  0 (0)     Other 2850 (1.6) 0 (0)     Stool  0 (0)     Blood  0 (0)     Total Output 2850 0      Net -2300 +1185.6             Urine Occurrence  0 x     Stool Occurrence 0 x 0 x     Emesis Occurrence  0 x           Physical Exam   Constitutional: He appears well-developed. He appears lethargic.   HENT:   Head: Normocephalic.   Eyes: Pupils are equal, round, and reactive to light. Scleral icterus is present.   Cardiovascular: Normal rate, regular rhythm, normal heart sounds and intact distal pulses.    Pulmonary/Chest: Effort normal and breath sounds normal.   Abdominal: Bowel sounds are normal. He exhibits distension. There is tenderness.   Musculoskeletal: Normal range of motion. He exhibits edema.   Neurological: He appears lethargic. GCS eye subscore is 2. GCS verbal subscore is 3. GCS motor subscore is 5.   Skin: Skin is warm and dry.   Nursing note and vitals reviewed.      Laboratory:  Immunosuppressants         Stop Route Frequency     sirolimus tablet 1 mg      -- Oral Daily     cycloSPORINE modified (NEORAL) 25 MG capsule      03/09 1714       cycloSPORINE modified (NEORAL) 100 MG capsule      03/09 1714       cycloSPORINE modified (NEORAL) 100 MG capsule      03/09 0514       cycloSPORINE modified (NEORAL) 25 MG capsule      03/09 0514          CBC:     Recent Labs  Lab 03/11/18  0530   WBC 8.48   RBC 2.47*   HGB 7.7*   HCT 23.5*      MCV 95   MCH 31.2*   MCHC 32.8     CMP:     Recent Labs  Lab 03/11/18  0530      CALCIUM 9.6   ALBUMIN 1.8*   PROT 5.7*      K 3.9   CO2 21*      BUN 60*   CREATININE 3.9*   ALKPHOS 128   ALT 7*   AST 42*   BILITOT 1.8*     Coagulation: No results for input(s): PT, INR, APTT in the last 168 hours.  Labs within the past 24 hours have been reviewed.    Diagnostic Results:  None

## 2018-03-11 NOTE — ASSESSMENT & PLAN NOTE
- CMV detected 2/1 now on tx dose valcyte. CMV PCR 2/15 not detected. CMV PCR 2/22, negative.  - Repeat CMV PCR 3/8 negative.  CMV PCR weekly.  - Pentam given 2/24.

## 2018-03-11 NOTE — PLAN OF CARE
Problem: Patient Care Overview  Goal: Plan of Care Review  Outcome: Ongoing (interventions implemented as appropriate)  Pt AAO x4 throughout shift. Pt more alert and went downstairs to cafeteria with mother this afternoon. Pt tolerated small amount of food. Diet advanced to Regular and PO intake encouraged. Pt free from falls and injury throughout shift. Sirolimus 1mg given this AM. Pt still with jerking movements during sleep. LP to be done tomorrow to rule out CSF infection.

## 2018-03-11 NOTE — ASSESSMENT & PLAN NOTE
- tbili with increase prompting ERCP 1/30 with stones and sludge, stents placed.  - Repeat ERCP 2/19 as was having recurrent fevers with bilirubin stuck at 2.0-2.2 with overall unremarkable findings seen. Did place new larger stents. Bilirubin was improving. Now trending up daily again. Needed repeat ERCP in 10 weeks.   - US 3/2 stent in CBD.  - ercp/eus 3/9 with mental stent placed in bile duct, stones removed

## 2018-03-11 NOTE — ASSESSMENT & PLAN NOTE
- Post op course complicated by fevers and hyperbilirubinemia.  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  - Liver US 1/8 showed 3.9 cm complex fluid collection anterior to right lobe and moderate nonspecific complex ascites inferior to transplant.   - IR placed drain 1/11/18, cell count negative for infection.  - PBS consulted. ERCP 1/17 with stone and sludge. Pt placed on Actigall - treated with 10 day course of abx.   - Bili elevated - liver u/s 1/28.  - Liver biopsy 1/23 without rejection.  - consulted PBS for ERCP - Choledocholithiasis was found along with sludge, stents placed 1/30.   - ERCP 2/19, overall unremarkable findings. Did place new larger stents. Will need repeat ERCP in 10 weeks. Bilirubin improving.  - Liver US reviewed w mod ascites.  Stent in CBD.    - Paracentesis 3/6 negative for infection, wbc 28 segs 3%.  - ERCP, EUS biopsy today - pending.

## 2018-03-11 NOTE — ASSESSMENT & PLAN NOTE
- Maintenance IS with cyclosporine --> transitioned to Prograf 2/24 for continued nausea. However now with neurological symptoms with prograf and hx of seizure, prograf d/c and cyclosporine restarted 3/1.  - MMF on hold for infections and neutropenia.   - d/c cyclosporine started rapa & stress dose steroids, monitor.

## 2018-03-11 NOTE — PROCEDURES
EEG REPORT      Jhonny Diana  [id number]  1990    DATE OF SERVICE: 3/9/2018    METHODOLOGY      Extended electroencephalographic recording is made while the patient is ambulatory and continuing normal daily activities.  Electrodes are placed according to the International 10-20 placement system and included T1 and T2 electrode placement.  Twenty four (24) channels of digital signal (sampling rate of 512/sec) was simultaneously recorded from the scalp including EKG and eye monitors.  Recording band pass was 0.1 to 100 hz and all data was stored digitally on the recorder.  The patient is instructed to press an event button when clinical symptoms occur and write the symptoms into a diary. Activation procedures which include photic stimulation, hyperventilation and instructing patients to perform simple task are done in selected patients.        The EEG is displayed on a monitor screen and can be reformatted into different montages for evaluation.  The entire recoding is submitted for computer assisted analysis to detect spike and electrographic seizure activity.  The entire recording is visually reviewed and the times identified by computer analysis as being spikes or seizures are reviewed again.  Compresses spectral analysis (CSA) is also performed on the activity recorded from each individual channel.  This is displayed as a power display of frequencies from 0 to 30 Hz over time.   The CSA analysis is done and displayed continuously.  This is reviewed for asymmetries in power between homologous areas of the scalp and for presence of changes in power which canbe seen when seizures occur.  Sections of suspected abnormalities on the CSA is then compared with the original EEG recording.  .     [employer] software was also utilized in the review of this study.  This software suite analyzes the EEG recording in multiple domains.  Coherence and rhythmicity is computed to identify EEG sections which may contain organized  seizures.  Each channel undergoes analysis to detect presence of spike and sharp waves which have special and morphological characteristic of epileptic activity.  The routine EEG recording is converted from spacial into frequency domain.  This is then displayed comparing homologous areas to identify areas of significant asymmetry.  Algorithm to identify non-cortically generated artifact is used to separate eye movement, EMG and other artifact from the EEG     Recording Times    A total of 27 minutes and 48 seconds of EEG was recorded.      EEG FINDINGS:    Background activity:   The background rhythm was characterized by alpha and anterior dominant beta with a 7-8 Hz posterior dominant alpha rhythm at 30-70 microvolts.     Symmetry and continuity: the background was continuous and symmetric     Sleep:   No sleep was recorded    Activation procedures:   Hyperventilation and photic stimulation were not performed     Abnormal activity:   No epileptiform discharges, periodic discharges, lateralized rhythmic delta activity or electrographic seizures were seen.      IMPRESSION:   This is an abnormal EEG due to the finding of a mild, generalized, non-specific cerebral dysfunction.  No seizures or epileptiform discharges were recorded.    CLINICAL CORRELATION IS RECOMMENDED.    Ed Chauhan MD

## 2018-03-11 NOTE — SUBJECTIVE & OBJECTIVE
Scheduled Meds:   bisacodyl  10 mg Oral Daily    ceFEPime (MAXIPIME) IVPB  1 g Intravenous Q12H    docusate sodium  100 mg Oral BID    epoetin maurisio (PROCRIT) injection  10,000 Units Intravenous Every Mon, Wed, Fri    ergocalciferol  50,000 Units Oral Q7 Days    fat emulsion 20%  250 mL Intravenous Once    heparin (porcine)  5,000 Units Subcutaneous Q8H    hydrocortisone sodium succinate  50 mg Intravenous Q8H    custom IVPB builder   Intravenous Q24H    levetiracetam oral soln  500 mg Oral BID    levothyroxine  75 mcg Oral Before breakfast    metronidazole  500 mg Intravenous Q8H    multivitamin  1 tablet Oral Daily    omeprazole  40 mg Oral QAM    polyethylene glycol  17 g Oral BID    psyllium husk (aspartame)  3.4 g Oral BID    sirolimus  1 mg Oral Daily    ursodiol  300 mg Oral BID     Continuous Infusions:   TPN ADULT CENTRAL LINE CUSTOM 48 mL/hr at 03/10/18 2219    TPN ADULT CENTRAL LINE CUSTOM       PRN Meds:sodium chloride, sodium chloride 0.9%, acetaminophen, albuterol-ipratropium 2.5mg-0.5mg/3mL, bisacodyl, dextrose 50%, dextrose 50%, diphenhydrAMINE-zinc acetate 1-0.1%, glucagon (human recombinant), glucose, glucose, naloxone, ondansetron, ondansetron, oxyCODONE, prochlorperazine, simethicone, sodium chloride 0.9%, sodium chloride 0.9%    Review of Systems   Unable to perform ROS: Mental status change     Objective:     Vital Signs (Most Recent):  Temp: 98.5 °F (36.9 °C) (03/11/18 0817)  Pulse: 87 (03/11/18 0817)  Resp: 16 (03/11/18 0817)  BP: 129/81 (03/11/18 0817)  SpO2: (!) 94 % (03/11/18 0817) Vital Signs (24h Range):  Temp:  [98.4 °F (36.9 °C)-100.1 °F (37.8 °C)] 98.5 °F (36.9 °C)  Pulse:  [] 87  Resp:  [16-18] 16  SpO2:  [94 %-97 %] 94 %  BP: (129-146)/(77-93) 129/81     Weight: 74.2 kg (163 lb 9.3 oz)  Body mass index is 26.4 kg/m².    Intake/Output - Last 3 Shifts       03/09 0700 - 03/10 0659 03/10 0700 - 03/11 0659 03/11 0700 - 03/12 0659    P.O. 0 0     Other 550       IV Piggyback  300     TPN  885.6     Total Intake(mL/kg) 550 (7.4) 1185.6 (16)     Urine (mL/kg/hr)  0 (0)     Emesis/NG output  0 (0)     Other 2850 (1.6) 0 (0)     Stool  0 (0)     Blood  0 (0)     Total Output 2850 0      Net -2300 +1185.6             Urine Occurrence  0 x     Stool Occurrence 0 x 0 x     Emesis Occurrence  0 x           Physical Exam   Constitutional: He appears well-developed. He appears lethargic.   HENT:   Head: Normocephalic.   Eyes: Pupils are equal, round, and reactive to light. Scleral icterus is present.   Cardiovascular: Normal rate, regular rhythm and intact distal pulses.    Pulmonary/Chest: Effort normal and breath sounds normal.   Abdominal: Bowel sounds are normal. He exhibits distension. There is tenderness.   Musculoskeletal: Normal range of motion. He exhibits edema.   Neurological: He appears lethargic. GCS eye subscore is 3. GCS verbal subscore is 3. GCS motor subscore is 5.   Skin: Skin is warm and dry.       Laboratory:  Immunosuppressants         Stop Route Frequency     sirolimus tablet 1 mg      -- Oral Daily     cycloSPORINE modified (NEORAL) 25 MG capsule      03/09 1714       cycloSPORINE modified (NEORAL) 100 MG capsule      03/09 1714       cycloSPORINE modified (NEORAL) 100 MG capsule      03/09 0514       cycloSPORINE modified (NEORAL) 25 MG capsule      03/09 0514          CBC:     Recent Labs  Lab 03/11/18  0530   WBC 8.48   RBC 2.47*   HGB 7.7*   HCT 23.5*      MCV 95   MCH 31.2*   MCHC 32.8     CMP:     Recent Labs  Lab 03/11/18  0530      CALCIUM 9.6   ALBUMIN 1.8*   PROT 5.7*      K 3.9   CO2 21*      BUN 60*   CREATININE 3.9*   ALKPHOS 128   ALT 7*   AST 42*   BILITOT 1.8*     Coagulation: No results for input(s): PT, INR, APTT in the last 168 hours.  Labs within the past 24 hours have been reviewed.    Diagnostic Results:  None

## 2018-03-11 NOTE — NURSING
Pt much more alert and oriented this afternoon. Pt went downstairs with his mother via wheelchair and per mom and patient, he ate a piece of rib and some mac and cheese. Pt also ate 1/4 of an apple per his mother. Pt requesting a diet. Per SAMREEN Stack okay to give patient Regular diet.

## 2018-03-11 NOTE — ASSESSMENT & PLAN NOTE
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, CMV D+/R+, steroid induction, on maintenance tacro/MMF/pred; c/b seizures, LAURA, superificial wound infection s/p wound vac to [facility] through 1/12, and several recent admissions on 11/24 and 12/3 with culture negative peritonitis with peak ascites WBC ~5k due to suspected indolent biliary leakage s/p sphincterotomy/biliary stent placement over CBD stricture on 12/6 ERCP vs another IA source) who was admitted on 1/11/2017 with acute onset fevers and acute on chronic N/V/abdominal pain (RLQ worst) and was found to have a loculated RLQ collection on imaging (not safe for percutaneous drainage), for which, after failure with more conservative treatments, he ultimately underwent laparoscopic drainage on 2/7/2018 (with dark brown fluid drained, culture negative but after extensive antibiotic exposure) but without complete resolution. He achieved defervescence but had persistent N/V/abdominal pain on empiric ertapenem with persistent loculated fluid noted on imaging that is not accessible to percutaneous drainage. 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 More recently he has developed AMS/neurologic symptoms concerning for either a new etiology of fevers or a toxicity of ertapenem (negative MRI, LP pending) -- of note, the  fact that his fevers recurred after cessation of ertapenem implies that this agent may still be treated an ESBL organism we cannot culture from his abdomen. He remains tenuous with a course otherwise c/b ongoing ileus and malnutrition.     - would continue empiric vanc/cefepime/flagyl for now but if spiking through these agents again and continues to have no improvement in neurologic issues, would transition to meropenem for ongoing empiric ESBL coverage given prior response to this agent (will hold off on change today since fever curve has improved last 24h)  - additional prophylaxis per protocol (valcyte, atovaquone given bactrim allergy, and isavuconazole given complex course)  - await ulcer biopsy results   - would pursue LP - send for cell count/diff, protein, glucose, bacterial/fungal/AFB stain/culture, HSV PCR, CMV PCR, EBV PCR (please obtain quantitative PCR which is a send-out lab and not the in house qualitative PCR), HHV6 PCR, crypto antigen, TB PCR, and toxo PCR -- please reserve any remaining CSF and freeze

## 2018-03-11 NOTE — PROGRESS NOTES
[facility]  Liver Transplant  Progress Note    Patient Name: Jhonny Diana  MRN: [id number]  Admission Date: 2018  Hospital Length of Stay: 59 days  Code Status: Full Code  Primary Care Provider: Primary Doctor No  Post-Operative Day: 143    ORGAN:   LIVER  Disease Etiology: Acute Alcoholic Hepatitis  Donor Type:    - Brain Death  CDC High Risk:   No  Donor CMV Status:   Donor CMV Status: Positive  Donor HBcAB:   Negative  Donor HCV Status:   Negative  Whole or Partial: Whole Liver  Biliary Anastomosis: End to End  Arterial Anatomy: Standard  Subjective:     History of Present Illness:  Jhonny Diana is a 26 y/o male with past medical history of alcoholic cirrhosis.  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 Found to have peritonitis in November (WBC 5000, 75% PNM) neg for bile leak. He was treated initially with vanc/cefepime. Repeat cell counts  with some improvement (WBC 1400, 45% PNM). He has undergone multiple paracenteses as well as abscess drainage of perihepatic fluid collections and treated with antimicrobial therapy but no positive cultures. Of note, biliary stricture also identified and ERCP performed on 2017 with sphincterotomy and biliary stent placed. Liver tests still have not normalized despite intervention, bilirubin and AP remain elevated. Other pertinent PMH current wound vac in place 2/2 wound infection, malnutrition requiring TPN for short course and ongoing hypoalbuminemia, and seizure activity while on prograf and has since been switched to cyclosporine without reoccurrence.  He presented to the ER for fever, abdominal pain, and N/V. He reports fever (103) for 1  day prior. Overnight, he developed N/V, reports small amount of green emesis with new left sided pain. He also endorses worsening SOB with exertion. He was scheduled as an outpatient for follow up paracentesis and IR drainage of fluid collection. CXR in ER shows large pleural effusion with subsegmental atelectasis. Infectious work up initiated in ER. His ANC is 900. Broad spectrum antibiotics initiated in ED. He denies chest pain, palpitations, diarrhea, constipation, or back pain. Denies any sick contacts.    Hospital Course:  Thoracentesis (1.2L off), Paracentesis (1.6L off), and IR drainage of fluid collection 1/11, all fluids negative for infection. ID consulted. Broad spectrum antibiotics d/c'd 1/15. Chronically malnourished with poor PO intake, prealbumin 7. Shane tube placed 1/15 for tube feedings.     ERCP 1/17 with sludge and a biliary stone which was removed and stent exchanged.  Remained with n/v; therefore, TF remained on hold and TPN continued.   Pt with fever s/p ERCP on 1/17 which continued until 1/19.  Vanc/cefepime restarted.  Blood cx 1/17 and 1/18 NGTD.  ID reconsulted.  Fungal markers sent.  CT C/A/P obtained.  With large R pleural effusion and ascites- both drained 1/19 and negative for infx per cell count. Pt afebrile 1/20. Liver biopsy 1/23 - without rejection.  EGD 1/25 - unremarkable for source of GI symptoms.  Resume diet along with other GI recommendations. SHANE tube placed for tube feeds.       2/22: Pt again spiked temp of 101 overnight, now down to 99. Resent Bcx overnight, prelim NGTD. Transitioned to Erta from vanc/zosyn per ID. Planning for tagged WBC scan on Monday. R sided abdominal erythema/dependent edema improving today.  TF have been on hold since 2/20. Will continue to hold today per pt request. Pt working on diet. KUB yesterday with significant stool and gas. Declines colace, miralax, metamucil, but willing to take daily bisacodyl. Pt agreeable to suppository today. If  suppository not successful can give mag citrate through dylan tonight. Will also retrial Marinol tonight as pt reported improvement in appetite previously. Will monitor closely for sedation.     2/23:  2 small BM 2/22.  Tolerated HD.  [place] removed.  Will attempt to eat this PM.      2/24:  Pt ate then vomited hamburger 2/23.  Will resume TPN.  D/C atovaquone, transition cyclo to Prograf, check Keppra level, increase Reglan to 0 mg, change twyla to BID for nausea.  Will give pentam today.    2/25:  TPN going well, pt ate 1/2 subway sandwhich without vomiting yesterday, will continue to monitor.  Plan to repeat EGD this week.  Pt with drop in H/h again and appears to be hemolyzing.  LDH elevated, hapto slightly low, LEE pending.  With neutropenia- Neupogen given.    2/26:  Pt with severe back pain s/p HD. Unclear if secondary to neupogen.  Repeat labs stable. Indium scan scheduled for 2/27- injection done today (back pain started prior). CT A/P obtained with fluid collection seen in pelvis.    2/27: Patient spiking fevers, 101. Indium scan, negative results. CT A/P obtained 2/26 with focal/loculated region of fluid in the lower right abdomen measuring approximately 8.7 x 5.5 cm. IR attempted drainage, however, per IR,there was  limited fluid interposed with bowel in the RLQ with no safe pocket seen for drainage.    2/28: Remains with low grade fevers. Neupogen given.     3/1: Patient with stuttering speech, trouble recalling specific words when speaking. Does have hx of seizure (on keppra) previously while on prograf. Switched to prograf 2/24 from cyclosporine. Neuro exam unremarkable. CT head without contrast obtained, unremarkable. In light of these symptoms, prograf d/c, cyclosporine restarted 3/1. Per transplant attending, held off on EGD at this time as concern anesthesia would put patient at further increase risk for seizure activity.     3/4: pt walked in room yesterday.  Had 1 BM.  No appetite still.  Cont TPN.   He reports swelling and back pain today.  He vomited after taking Oxy PO.  Fentanyl 12.5mg IV ordered.  Patient rested well afterward.  Remains afebrile.   ID following.   Cont Ertapenem.  Repeat blood and urine cx given continued malaise and stutter- blood cx prelim NGTD.  UA ok.  Urine cx pending.    Tentative plan for EGD on Monday to assess source of fevers/nausea/abdominal pain w bx for CMV and GVHD.  Will also reassess rather able to perform paracentesis.     3/5: no acute events overnight. Pt still not feeling well and with poor sleep overnight. Cont to have mumbling of speech at times but slightly better than before. Remains with abdominal pain but relieved with PO pain meds. Tolerated HD well today. Will plan for paracentesis and EGD tomorrow to assess for peritonitis and cause of persistent N/V respectively.     3/6: pt continues to have garbled/mumbled speech. EGD performed today and pt received sedation during procedure. Increased lethargy and dysarthria noted post procedure. VS stable. Paracentesis scheduled today as well to r/o peritonitis. Wbc count stable. Afebrile. Continue Ertapenem. HD yesterday and pt tolerated well, 2.5 L removed. Monitor.     3/7: MRI brain yesterday unremarkable. Blood cultures ngtd so far. Neurology consulted. EGD and Paracentesis yesterday. Para with 25 cc removed, wbc 28 segs 3%. Continue TPN. Monitor.    3/8: EUS and ERCP scheduled tomorrow. NPO after midnight. T bili continues trending up daily. Last ERCP on 2/19 with two stents placed. Ertapenem dc'd 3/7. Pt spiking temps today, tmax 100.9. Cefepime/vanc started. Oxycodone decreased to 5 mg q6h prn. Dc marinol and reglan for now. He continues to slur/stutter and have jerky movements throughout the day. Neurology following. EEG scheduled today. Monitor.     3/9: EUS/ERCP completed with choledoliathiasis.  Fever over night.  May need LP in future if mental status does not improve.      3/10- speech improved, neuro  suggesting lumbar puncture - schedule for Monday    3/11 - more lethargic, tmax 100.9, plan for lumbar puncture in am.    Scheduled Meds:   bisacodyl  10 mg Oral Daily    ceFEPime (MAXIPIME) IVPB  1 g Intravenous Q12H    docusate sodium  100 mg Oral BID    epoetin maurisio (PROCRIT) injection  10,000 Units Intravenous Every Mon, Wed, Fri    ergocalciferol  50,000 Units Oral Q7 Days    fat emulsion 20%  250 mL Intravenous Once    heparin (porcine)  5,000 Units Subcutaneous Q8H    hydrocortisone sodium succinate  50 mg Intravenous Q8H    custom IVPB builder   Intravenous Q24H    levetiracetam oral soln  500 mg Oral BID    levothyroxine  75 mcg Oral Before breakfast    metronidazole  500 mg Intravenous Q8H    multivitamin  1 tablet Oral Daily    omeprazole  40 mg Oral QAM    polyethylene glycol  17 g Oral BID    psyllium husk (aspartame)  3.4 g Oral BID    sirolimus  1 mg Oral Daily    ursodiol  300 mg Oral BID     Continuous Infusions:   TPN ADULT CENTRAL LINE CUSTOM 48 mL/hr at 03/10/18 2219    TPN ADULT CENTRAL LINE CUSTOM       PRN Meds:sodium chloride, sodium chloride 0.9%, acetaminophen, albuterol-ipratropium 2.5mg-0.5mg/3mL, bisacodyl, dextrose 50%, dextrose 50%, diphenhydrAMINE-zinc acetate 1-0.1%, glucagon (human recombinant), glucose, glucose, naloxone, ondansetron, ondansetron, oxyCODONE, prochlorperazine, simethicone, sodium chloride 0.9%, sodium chloride 0.9%    Review of Systems   Unable to perform ROS: Mental status change     Objective:     Vital Signs (Most Recent):  Temp: 98.5 °F (36.9 °C) (03/11/18 0817)  Pulse: 87 (03/11/18 0817)  Resp: 16 (03/11/18 0817)  BP: 129/81 (03/11/18 0817)  SpO2: (!) 94 % (03/11/18 0817) Vital Signs (24h Range):  Temp:  [98.4 °F (36.9 °C)-100.1 °F (37.8 °C)] 98.5 °F (36.9 °C)  Pulse:  [] 87  Resp:  [16-18] 16  SpO2:  [94 %-97 %] 94 %  BP: (129-146)/(77-93) 129/81     Weight: 74.2 kg (163 lb 9.3 oz)  Body mass index is 26.4 kg/m².    Intake/Output -  Last 3 Shifts       03/09 0700 - 03/10 0659 03/10 0700 - 03/11 0659 03/11 0700 - 03/12 0659    P.O. 0 0     Other 550      IV Piggyback  300     TPN  885.6     Total Intake(mL/kg) 550 (7.4) 1185.6 (16)     Urine (mL/kg/hr)  0 (0)     Emesis/NG output  0 (0)     Other 2850 (1.6) 0 (0)     Stool  0 (0)     Blood  0 (0)     Total Output 2850 0      Net -2300 +1185.6             Urine Occurrence  0 x     Stool Occurrence 0 x 0 x     Emesis Occurrence  0 x           Physical Exam   Constitutional: He appears well-developed. He appears lethargic.   HENT:   Head: Normocephalic.   Eyes: Pupils are equal, round, and reactive to light. Scleral icterus is present.   Cardiovascular: Normal rate, regular rhythm, normal heart sounds and intact distal pulses.    Pulmonary/Chest: Effort normal and breath sounds normal.   Abdominal: Bowel sounds are normal. He exhibits distension. There is tenderness.   Musculoskeletal: Normal range of motion. He exhibits edema.   Neurological: He appears lethargic. GCS eye subscore is 2. GCS verbal subscore is 3. GCS motor subscore is 5.   Skin: Skin is warm and dry.   Nursing note and vitals reviewed.      Laboratory:  Immunosuppressants         Stop Route Frequency     sirolimus tablet 1 mg      -- Oral Daily     cycloSPORINE modified (NEORAL) 25 MG capsule      03/09 1714       cycloSPORINE modified (NEORAL) 100 MG capsule      03/09 1714       cycloSPORINE modified (NEORAL) 100 MG capsule      03/09 0514       cycloSPORINE modified (NEORAL) 25 MG capsule      03/09 0514          CBC:     Recent Labs  Lab 03/11/18  0530   WBC 8.48   RBC 2.47*   HGB 7.7*   HCT 23.5*      MCV 95   MCH 31.2*   MCHC 32.8     CMP:     Recent Labs  Lab 03/11/18  0530      CALCIUM 9.6   ALBUMIN 1.8*   PROT 5.7*      K 3.9   CO2 21*      BUN 60*   CREATININE 3.9*   ALKPHOS 128   ALT 7*   AST 42*   BILITOT 1.8*     Coagulation: No results for input(s): PT, INR, APTT in the last 168 hours.  Labs  within the past 24 hours have been reviewed.    Diagnostic Results:  None    Assessment/Plan:     Fever    - Started on vanc/cefepime for ERCP. Now transitioned to vanc/zosyn.  - Blood cultures sent, prelim NGTD. Repeat blood cultures and chest xray today 2/10.  - thoracentesis 2/1 - fluid negative for infection.  - atelectasis noted on ct scan - start breathing tx & CPT.  - now cmv positive - started treatment 2/5/18.  - taken to OR 2/7 for exp lap - fluid collections drained, cultures pending.  - Patient with low grade fever 2/15, 100.3.  - CT A/P 2/15 with no evidence of IA fluid infection. Did show R pleural effusion s/p thoracentesis, cell count negative for infection, cx pending.  - Repeat blood cx 2/15 prelim NGTD, CMV PCR 2/15 negative   Complaining of abdominal pain. Is tender in RUQ.  - Remains with fevers.  - Bilirubin remains elevated. Concerning for cholangitis. Antibiotics broadened 2/17. ID re consulted.   - ERCP  2/19, overall unremarkable findings. Did place new larger stents. Will need repeat ERCP in 10 weeks. Bilirubin is stable post ERCP.  -Tmax 101 2/21 PM again ID following. Plan to transition vanc/zosyn to ertapenem 2/22 as no improvement noted.   - Erta started 2/22.  - CT A/P obtained 2/26 with focal/loculated region of fluid in the lower right abdomen measuring approximately 8.7 x 5.5 cm.   - IR attempted drainage on fluid collection on 2/27, however, per IR,there was limited fluid interposed with bowel in the RLQ with no safe pocket seen for drainage.  -Tagged WBC scan 2/27, negative results.  - Remains with low grade fevers.   - Per transplant attending, will hold off on EGD at this time as concern anesthesia will put patient at further increase risk for seizure activity. Will plan for potenial EGD on Monday. Discussed with GI, appreciate their assistance.  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 EBV PCR negative. Adenovirus pending. Repeat CMV PCR negative.    - Back pain  and abdominal pain persists. Last BM 3/5 with brown bomb.   - ID following. Cont Ertapenem dc'd 3/7 - due to AMS   - tmax 101.1 overnight 3/9 - Cefepime/vanc/flagyl currently.  Monitor.           Severe protein-calorie malnutrition    - Poor PO intake since transplant requiring short course of TPN during previous hospital stay.   - albumin remains decreased but appetite slowly improving per pt.   - Dietary consulted. Will need to closely monitor PO intake.   - supplements also ordered.   - SHANE with tube feeding start 1/16. Stopped 1/17/18, was not tolerating. [place] removed 1/20.  - prealbumin 7 on 1/15.  - TPN started 1/16/18.   - Pt able to eat minimally 1/21 and 1/22 which is an improvement.  - GI consulted - EGD reviewed, increased ppi bid, miralax bid, and metamucil bid, decreased narcotics.  - continue reglan, placed SHANE tube with tube feeds - transitioned to nightly feeds. Not tolerating well due to nausea.   - small bowel follow through with normal findings.   - consulted gi for possible gj tube placement, holding off on this for now as with ongoing fevers.  - Dietary changed tube feeds back to 24 hours from nightly 2/19 as not eating enough during the day to meet caloric needs. TF on hold since 2/20.  - retrial marinol 2/22 as pt reports that's the only thing helping his appetite, in the evening 2/2 concern for possible drowsiness  - Pt then developed persistent N/V again and TPN started 2/24. Cont TPN for now.   - Pt able to eat 1/4 sandwich without vomiting 2/25.  Not been able to eat anything more than that in 48 hours per pt and mother.    - EGD 3/6 with normal esophagus    - congested, erythematous and nodular mucosa in the stomach.    - biliary stent in the duodenum    - one duodenal ulcer with a clean ulcer base (Emir Class III).    - biopsies taken in the duodenal bulb and second and third portion of the duodenum.  - hold reglan for neuro symptoms.        Acute renal failure with tubular necrosis     - HD resumed on previous admission. Now anuric and dialyzes M-W-F.  - Nephrology following.    - Ktm consulted for possible kidney transplant.        Other cytomegaloviral diseases    - detected at 370 on CMV PCR 2/1.  - case discussed with ID and will hold off on treatment at this time given low WBC.   - undetected CMV PCR 2/8.  - continue Valcyte 200 mg every Mon, Wed, Fri.  - CMV PCR 2/15 negative.  - Per ID, valcyte dose decreased to ppx dose on 2/18.  - CMV PCR 2/22 negative.   - CMV PCR 3/8 pending.  - Repeat scope 3/6 to assess persistent N/V and possible CMV causing these symptoms. Biopsies pending.        Liver transplanted    - Post op course complicated by fevers and hyperbilirubinemia.  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  - Liver US 1/8 showed 3.9 cm complex fluid collection anterior to right lobe and moderate nonspecific complex ascites inferior to transplant.   - IR placed drain 1/11/18, cell count negative for infection.  - PBS consulted. ERCP 1/17 with stone and sludge. Pt placed on Actigall - treated with 10 day course of abx.   - Bili elevated - liver u/s 1/28.  - Liver biopsy 1/23 without rejection.  - consulted PBS for ERCP - Choledocholithiasis was found along with sludge, stents placed 1/30.   - ERCP 2/19, overall unremarkable findings. Did place new larger stents. Will need repeat ERCP in 10 weeks. Bilirubin improving.  - Liver US reviewed w mod ascites.  Stent in CBD.    - Paracentesis 3/6 negative for infection, wbc 28 segs 3%.  - ERCP, EUS biopsy today - pending.        Constipation    - Enema ordered 1/20 and 1/21 with BM.  - Encourage ambulation.  - Decrease narcotics.  - D/c metamucil and miralax bid as makes patient nauseated.  - Increase reglan qid.  - Small bowel follow through with normal findings.   - Schedule bisacodyl for bowel regimen.   - KUB 2/21 with significant amounts of stool and gas. Pt declines miralax, metamucil, colace.   - encouraged  suppository.  - Improved on KUB 2/23.  - Pt moving bowels now but will have to keep aggressive bowel regimen on board.   - Brown bomb 3/5 with BM x 1.  - KUB 3/8 with findings concerning for a partial bowel obstruction.  - reports passing flatus, may need enema        Long-term use of immunosuppressant medication    - Maintenance IS with cyclosporine --> transitioned to Prograf 2/24 for continued nausea. However now with neurological symptoms with prograf and hx of seizure, prograf d/c and cyclosporine restarted 3/1.  - MMF on hold for infections and neutropenia.   - d/c cyclosporine started rapa & stress dose steroids, monitor.        Prophylactic immunotherapy    - See long term use of immunosuppression.         Anemia of chronic disease    - H&H stable. Cont to monitor with daily cbc.         Recurrent pleural effusion on right    - CXR in ER with large right pleural effusion with subjective c/o worsening SOB.  - thoracentesis completed- 1200 ml removed.  SOB with significant improvement.   - Cell count reviewed and negative for infection.   - resp even and non labored.  O2 sat 94-98%.  - Repeat thora 1/19 negative for infection per cell count.  - Reaccumulating fluid on xray 1/21.  - thoracentesis 1/31 - 1.4L removed, negative for infection.  - Reaccumulating fluid again on xray 2/10.   - Thoracentesis performed  2/16 with 1500 cc off, cell count neg for infection, cx no growth to date.   - CT A/P 2/26 with large R pleural effusion, may need repeat thora if fevers reoccur.          Biliary stricture of transplanted liver    - tbili with increase prompting ERCP 1/30 with stones and sludge, stents placed.  - Repeat ERCP 2/19 as was having recurrent fevers with bilirubin stuck at 2.0-2.2 with overall unremarkable findings seen. Did place new larger stents. Bilirubin was improving. Now trending up daily again. Needed repeat ERCP in 10 weeks.   - US 3/2 stent in CBD.  - ercp/eus 3/9 with mental stent placed in bile  duct, stones removed        Seizure    - Patient with stuttering speech, trouble recalling specific words when speaking. Does have hx of seizure (on keppra) previously while on prograf. Switched to prograf over the weekend from cyclosporine. Neuro exam unremarkable.   - CT head without contrast obtained, unremarkable.   - In light of these symptoms, prograf d/c, restart cyclosporine 3/1.  - remains w/o seizure. Cont to have stutter/garbled speech/difficulty finding words.  Worse post EGD as sedation was given.  - MRI 3/6 unremarkable.  - EEG completed 3/8 - results pending.  - monitor closely. VSS.          At risk for opportunistic infections    - CMV detected 2/1 now on tx dose valcyte. CMV PCR 2/15 not detected. CMV PCR 2/22, negative.  - Repeat CMV PCR 3/8 negative.  CMV PCR weekly.  - Pentam given 2/24.            Delayed surgical wound healing    - wd vac removed 1/12/18.  Wound healing well. Aquacel AG and Mepilex border dressing applied via wound care recs.           Vitamin D deficiency    - start ergo 50K weekly.           Other neutropenia    - WBC stable today.   - Neupogen last given 3/1.           Other ascites    - Paracentesis performed 1/11/18 with 1600 ml removed.    - fluid negative for infection.   - Repeat para 1/19 negative for infection per cell count.  - no fluid seen for paracentesis on 1/23.  - Para 3/6 (diagnostic) negative for infection; wbc 28 segs 3%.        Nausea and vomiting    - h/o constipation, reports having regular BM's with bowel regimen.  - vomiting episode x 1 at home. Anti-emetics ordered PRN.  - KUB repeated 1/14/18 given increased abdominal pain- mild, generalized bowel distention suggesting ileus.  Diet changed to clears as tolerated for bowel rest .  - SHANE tube placed 1/16 for tube feeds. Not tolerating tube feeds, stopped 1/17/18.  - D/C shane 1/19, now in place as of 1/25.  - chronic constipation, Cont bowel regimen.   - See protein calorie malnutrition.  - TF have been  on hold since 2/20 for N/V. dylan d/c'd 2/23 as not tolerating TF  - 2/24 changes: Increased Reglan to 10 mg, D/C atovaquone, transitioned cyclo to Prograf, change twyla to BID, check Keppra level  - GI consulted. EGD 3/6 as with continued nausea/vomiting with po intake. Appreciate GI recs.    - Cont to have nausea and vomiting w medications and eating.  Cont TPN.     - holding reglan 3/8 due to neurologic symptoms.                VTE Risk Mitigation         Ordered     heparin (porcine) injection 5,000 Units  Every 8 hours     Route:  Subcutaneous        03/08/18 1622     Medium Risk of VTE  Once      01/11/18 0351     Place sequential compression device  Until discontinued      01/11/18 0351          The patients clinical status was discussed at multidisplinary rounds, involving transplant surgery, transplant medicine, pharmacy, nursing, nutrition, and social work    Discharge Planning:  Monitor kidney function --> may need outpt HD  Monitor HH needs vs rehab for deconditioned status      Seda Camacho, NP  Liver Transplant  [facility]

## 2018-03-11 NOTE — PROCEDURES
RLQ Drain removed:  Site cleaned with alcohol, lidocaine 1% used to numb area to place prolene 3.0 suture to site.  Drain intact at time of removal.  Patient tolerated procedure well.  Will continue to monitor for any complications.     no

## 2018-03-11 NOTE — PROGRESS NOTES
[facility]  Infectious Disease  Progress Note    Patient Name: Jhonny Diana  MRN: [id number]  Admission Date: 1/11/2018  Length of Stay: 59 days  Attending Physician: Nathanael Medina MD  Primary Care Provider: Primary Doctor No    Isolation Status: No active isolations  Assessment/Plan:      Fever    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+, steroid induction, on maintenance tacro/MMF/pred; c/b seizures, LAURA, superificial wound infection s/p wound vac to [facility] through 1/12, and several recent admissions on 11/24 and 12/3 with culture negative peritonitis with peak ascites WBC ~5k due to suspected indolent biliary leakage s/p sphincterotomy/biliary stent placement over CBD stricture on 12/6 ERCP vs another IA source) who was admitted on 1/11/2017 with acute onset fevers and acute on chronic N/V/abdominal pain (RLQ worst) and was found to have a loculated RLQ collection on imaging (not safe for percutaneous drainage), for which, after failure with more conservative treatments, he ultimately underwent laparoscopic drainage on 2/7/2018 (with dark brown fluid drained, culture negative but after extensive antibiotic exposure) but without complete resolution. He achieved defervescence but had persistent N/V/abdominal pain on empiric ertapenem with persistent loculated fluid noted on imaging that is not accessible to percutaneous drainage. 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 More recently he has developed AMS/neurologic symptoms concerning for either a new etiology of fevers or a toxicity of ertapenem (negative MRI, LP pending) -- of note, the fact that his fevers recurred after cessation of ertapenem implies that this agent may still be treated an ESBL organism we cannot culture from his abdomen. He remains tenuous with a course otherwise c/b ongoing ileus and malnutrition.     - would continue empiric vanc/cefepime/flagyl for now but if spiking through these agents again and continues to have no improvement in neurologic issues, would transition to meropenem for ongoing empiric ESBL coverage given prior response to this agent (will hold off on change today since fever curve has improved last 24h)  - additional prophylaxis per protocol (valcyte, atovaquone given bactrim allergy, and isavuconazole given complex course)  - await ulcer biopsy results   - would pursue LP - send for cell count/diff, protein, glucose, bacterial/fungal/AFB stain/culture, HSV PCR, CMV PCR, EBV PCR (please obtain quantitative PCR which is a send-out lab and not the in house qualitative PCR), HHV6 PCR, crypto antigen, TB PCR, and toxo PCR -- please reserve any remaining CSF and freeze              Anticipated Disposition: pending improvement    Thank you for your consult. I will follow-up with patient. Please contact us if you have any additional questions.     Melanie Bergman MD  Transplant ID Attending  [id number]    Melanie Bergman MD  Infectious Disease  [facility]    Subjective:     Principal Problem:Fever    HPI: No notes on file  Interval History: Remains lethargic. Last fever over 24h ago. LP planned for morning.    Review of Systems   Constitutional: Positive for activity change, appetite change and fatigue. Negative for chills and fever.    HENT: Negative for congestion and facial swelling.    Eyes: Negative for pain, discharge and visual disturbance.   Respiratory: Negative for cough, chest tightness, shortness of breath and wheezing.    Cardiovascular: Negative for chest pain, palpitations and leg swelling.   Gastrointestinal: Positive for abdominal distention, abdominal pain and nausea. Negative for constipation and vomiting.   Endocrine: Negative.    Genitourinary:        Anuric   Musculoskeletal: Negative for arthralgias, back pain and myalgias.   Skin: Positive for color change and wound. Negative for rash.   Allergic/Immunologic: Positive for immunocompromised state.   Neurological: Negative for dizziness, seizures, weakness and headaches.   Psychiatric/Behavioral: Positive for confusion, decreased concentration and dysphoric mood. The patient is not nervous/anxious.    All other systems reviewed and are negative.      Objective:     Vital Signs (Most Recent):  Temp: 99.2 °F (37.3 °C) (02/17/18 1542)  Pulse: 90 (02/17/18 1542)  Resp: 16 (02/17/18 1542)  BP: 135/85 (02/17/18 1542)  SpO2: 95 % (02/17/18 1542) Vital Signs (24h Range):  Temp:  [98.6 °F (37 °C)-101.2 °F (38.4 °C)] 99.2 °F (37.3 °C)  Pulse:  [84-92] 90  Resp:  [16-20] 16  SpO2:  [92 %-96 %] 95 %  BP: (128-137)/(75-85) 135/85     Weight: 60.2 kg (132 lb 11.5 oz)  Body mass index is 21.42 kg/m².    Estimated Creatinine Clearance: 26.8 mL/min (A) (based on SCr of 3.5 mg/dL (H)).    Physical Exam   Constitutional: He is oriented to person, place, and time. He appears well-developed. No distress.   Temporal and distal extremity muscle wasting   HENT:   Head: Normocephalic and atraumatic.   Mouth/Throat: No oropharyngeal exudate.   dylan tube   Eyes: EOM are normal. Pupils are equal, round, and reactive to light. No scleral icterus.   Neck: Normal range of motion. Neck supple. No JVD present. No thyromegaly present.   Cardiovascular: Normal rate, regular rhythm, normal heart sounds and  intact distal pulses.    No murmur heard.  Pulmonary/Chest: Effort normal. No respiratory distress. He has no wheezes. He exhibits no tenderness.   Diminished to RLL   Abdominal: Soft. Bowel sounds are normal. He exhibits ascites. He exhibits no distension. There is tenderness. There is no rebound and no guarding.   Dressing to chevron.  Wd vac removed 1/12/18  Dependent edema present R flank; surrounding erythema   Musculoskeletal: Normal range of motion. He exhibits no tenderness. Edema: 2+ LE edema.   Neurological: He is alert and oriented to person, place, and time. He has normal reflexes.   Skin: Skin is warm and dry. He is not diaphoretic. No erythema.   Psychiatric: His mood appears not anxious.   Nursing note and vitals reviewed.      Significant Labs:   CBC:     Recent Labs  Lab 02/16/18  0436 02/17/18  0343   WBC 2.85* 2.23*   HGB 8.8* 7.8*   HCT 26.8* 23.9*    134*     CMP:     Recent Labs  Lab 02/16/18 0436 02/17/18  0343   * 137   K 4.0 3.8    105   CO2 23 23   GLU 84 81   BUN 23* 13   CREATININE 5.1* 3.5*   CALCIUM 9.5 8.8   PROT 5.6* 5.1*   ALBUMIN 2.0* 1.8*   BILITOT 2.2* 2.1*   ALKPHOS 125 111   AST 25 18   ALT <5* <5*   ANIONGAP 11 9   EGFRNONAA 14.2* 22.4*       Significant Imaging: I have reviewed all pertinent imaging results/findings within the past 24 hours.     CT CAP:   Post surgical changes prior hepatic transplant.  Percutaneous surgical drain with notable decrease in size of fluid collection in lower abdomen.  Small amount of fluid tracking elsewhere within the abdomen and pelvis.  Large volume right pleural fluid, increased in size since prior exam, with associated right lower lobe atelectasis.  Diffuse body wall edema.  Small pericardial effusion.    Microbiology:  1/11 blood cx: negative  1/11 pleural fluid cx: negative  1/11 ascites cx: negative  1/17 blood cx: negative  1/18 blood cx: negative  1/19 pleural fluid cx: NGTD  1/19 ascites cx: NGTD  2/1 RVP  negative  2/7 OR cx: NGTD  2/11 blood cx: negative  2/15 blood cx: negative  2/16 pleural fluid cx: negative

## 2018-03-11 NOTE — ASSESSMENT & PLAN NOTE
- Patient with stuttering speech, trouble recalling specific words when speaking. Does have hx of seizure (on keppra) previously while on prograf. Switched to prograf over the weekend from cyclosporine. Neuro exam unremarkable.   - CT head without contrast obtained, unremarkable.   - In light of these symptoms, prograf d/c, restart cyclosporine 3/1.  - remains w/o seizure. Cont to have stutter/garbled speech/difficulty finding words.  Worse post EGD as sedation was given.  - MRI 3/6 unremarkable.  - EEG completed 3/8 - results pending.  - monitor closely. VSS.

## 2018-03-11 NOTE — ASSESSMENT & PLAN NOTE
- Enema ordered 1/20 and 1/21 with BM.  - Encourage ambulation.  - Decrease narcotics.  - D/c metamucil and miralax bid as makes patient nauseated.  - Increase reglan qid.  - Small bowel follow through with normal findings.   - Schedule bisacodyl for bowel regimen.   - KUB 2/21 with significant amounts of stool and gas. Pt declines miralax, metamucil, colace.   - encouraged suppository.  - Improved on KUB 2/23.  - Pt moving bowels now but will have to keep aggressive bowel regimen on board.   - Brown bomb 3/5 with BM x 1.  - KUB 3/8 with findings concerning for a partial bowel obstruction.  - reports passing flatus, may need enema

## 2018-03-11 NOTE — ASSESSMENT & PLAN NOTE
- Started on vanc/cefepime for ERCP. Now transitioned to vanc/zosyn.  - Blood cultures sent, prelim NGTD. Repeat blood cultures and chest xray today 2/10.  - thoracentesis 2/1 - fluid negative for infection.  - atelectasis noted on ct scan - start breathing tx & CPT.  - now cmv positive - started treatment 2/5/18.  - taken to OR 2/7 for exp lap - fluid collections drained, cultures pending.  - Patient with low grade fever 2/15, 100.3.  - CT A/P 2/15 with no evidence of IA fluid infection. Did show R pleural effusion s/p thoracentesis, cell count negative for infection, cx pending.  - Repeat blood cx 2/15 prelim NGTD, CMV PCR 2/15 negative   Complaining of abdominal pain. Is tender in RUQ.  - Remains with fevers.  - Bilirubin remains elevated. Concerning for cholangitis. Antibiotics broadened 2/17. ID re consulted.   - ERCP  2/19, overall unremarkable findings. Did place new larger stents. Will need repeat ERCP in 10 weeks. Bilirubin is stable post ERCP.  -Tmax 101 2/21 PM again ID following. Plan to transition vanc/zosyn to ertapenem 2/22 as no improvement noted.   - Erta started 2/22.  - CT A/P obtained 2/26 with focal/loculated region of fluid in the lower right abdomen measuring approximately 8.7 x 5.5 cm.   - IR attempted drainage on fluid collection on 2/27, however, per IR,there was limited fluid interposed with bowel in the RLQ with no safe pocket seen for drainage.  -Tagged WBC scan 2/27, negative results.  - Remains with low grade fevers.   - Per transplant attending, will hold off on EGD at this time as concern anesthesia will put patient at further increase risk for seizure activity. Will plan for potenial EGD on Monday. Discussed with GI, appreciate their assistance.  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 EBV PCR negative. Adenovirus pending. Repeat CMV PCR negative.    - Back pain and abdominal pain persists. Last BM 3/5 with brown bomb.   - ID following. Cont Ertapenem dc'd 3/7 - due to  AMS   - tmax 101.1 overnight 3/9 - Cefepime/vanc/flagyl currently.  Monitor.

## 2018-03-11 NOTE — SUBJECTIVE & OBJECTIVE
Interval History: Remains lethargic. Last fever over 24h ago. LP planned for morning.    Review of Systems   Constitutional: Positive for activity change, appetite change and fatigue. Negative for chills and fever.   HENT: Negative for congestion and facial swelling.    Eyes: Negative for pain, discharge and visual disturbance.   Respiratory: Negative for cough, chest tightness, shortness of breath and wheezing.    Cardiovascular: Negative for chest pain, palpitations and leg swelling.   Gastrointestinal: Positive for abdominal distention, abdominal pain and nausea. Negative for constipation and vomiting.   Endocrine: Negative.    Genitourinary:        Anuric   Musculoskeletal: Negative for arthralgias, back pain and myalgias.   Skin: Positive for color change and wound. Negative for rash.   Allergic/Immunologic: Positive for immunocompromised state.   Neurological: Negative for dizziness, seizures, weakness and headaches.   Psychiatric/Behavioral: Positive for confusion, decreased concentration and dysphoric mood. The patient is not nervous/anxious.    All other systems reviewed and are negative.      Objective:     Vital Signs (Most Recent):  Temp: 99.2 °F (37.3 °C) (02/17/18 1542)  Pulse: 90 (02/17/18 1542)  Resp: 16 (02/17/18 1542)  BP: 135/85 (02/17/18 1542)  SpO2: 95 % (02/17/18 1542) Vital Signs (24h Range):  Temp:  [98.6 °F (37 °C)-101.2 °F (38.4 °C)] 99.2 °F (37.3 °C)  Pulse:  [84-92] 90  Resp:  [16-20] 16  SpO2:  [92 %-96 %] 95 %  BP: (128-137)/(75-85) 135/85     Weight: 60.2 kg (132 lb 11.5 oz)  Body mass index is 21.42 kg/m².    Estimated Creatinine Clearance: 26.8 mL/min (A) (based on SCr of 3.5 mg/dL (H)).    Physical Exam   Constitutional: He is oriented to person, place, and time. He appears well-developed. No distress.   Temporal and distal extremity muscle wasting   HENT:   Head: Normocephalic and atraumatic.   Mouth/Throat: No oropharyngeal exudate.   dylan tube   Eyes: EOM are normal. Pupils are  equal, round, and reactive to light. No scleral icterus.   Neck: Normal range of motion. Neck supple. No JVD present. No thyromegaly present.   Cardiovascular: Normal rate, regular rhythm, normal heart sounds and intact distal pulses.    No murmur heard.  Pulmonary/Chest: Effort normal. No respiratory distress. He has no wheezes. He exhibits no tenderness.   Diminished to RLL   Abdominal: Soft. Bowel sounds are normal. He exhibits ascites. He exhibits no distension. There is tenderness. There is no rebound and no guarding.   Dressing to chevron.  Wd vac removed 1/12/18  Dependent edema present R flank; surrounding erythema   Musculoskeletal: Normal range of motion. He exhibits no tenderness. Edema: 2+ LE edema.   Neurological: He is alert and oriented to person, place, and time. He has normal reflexes.   Skin: Skin is warm and dry. He is not diaphoretic. No erythema.   Psychiatric: His mood appears not anxious.   Nursing note and vitals reviewed.      Significant Labs:   CBC:     Recent Labs  Lab 02/16/18  0436 02/17/18  0343   WBC 2.85* 2.23*   HGB 8.8* 7.8*   HCT 26.8* 23.9*    134*     CMP:     Recent Labs  Lab 02/16/18  0436 02/17/18  0343   * 137   K 4.0 3.8    105   CO2 23 23   GLU 84 81   BUN 23* 13   CREATININE 5.1* 3.5*   CALCIUM 9.5 8.8   PROT 5.6* 5.1*   ALBUMIN 2.0* 1.8*   BILITOT 2.2* 2.1*   ALKPHOS 125 111   AST 25 18   ALT <5* <5*   ANIONGAP 11 9   EGFRNONAA 14.2* 22.4*       Significant Imaging: I have reviewed all pertinent imaging results/findings within the past 24 hours.     CT CAP:   Post surgical changes prior hepatic transplant.  Percutaneous surgical drain with notable decrease in size of fluid collection in lower abdomen.  Small amount of fluid tracking elsewhere within the abdomen and pelvis.  Large volume right pleural fluid, increased in size since prior exam, with associated right lower lobe atelectasis.  Diffuse body wall edema.  Small pericardial  effusion.    Microbiology:  1/11 blood cx: negative  1/11 pleural fluid cx: negative  1/11 ascites cx: negative  1/17 blood cx: negative  1/18 blood cx: negative  1/19 pleural fluid cx: NGTD  1/19 ascites cx: NGTD  2/1 RVP negative  2/7 OR cx: NGTD  2/11 blood cx: negative  2/15 blood cx: negative  2/16 pleural fluid cx: negative

## 2018-03-11 NOTE — PROGRESS NOTES
[facility]  Liver Transplant  Progress Note    Patient Name: Jhonny Diana  MRN: [id number]  Admission Date: 2018  Hospital Length of Stay: 59 days  Code Status: Full Code  Primary Care Provider: Primary Doctor No  Post-Operative Day: 143    ORGAN:   LIVER  Disease Etiology: Acute Alcoholic Hepatitis  Donor Type:    - Brain Death  CDC High Risk:   No  Donor CMV Status:   Donor CMV Status: Positive  Donor HBcAB:   Negative  Donor HCV Status:   Negative  Whole or Partial: Whole Liver  Biliary Anastomosis: End to End  Arterial Anatomy: Standard  Subjective:     History of Present Illness:  Jhonny Diana is a 28 y/o male with past medical history of alcoholic cirrhosis.  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 Found to have peritonitis in November (WBC 5000, 75% PNM) neg for bile leak. He was treated initially with vanc/cefepime. Repeat cell counts  with some improvement (WBC 1400, 45% PNM). He has undergone multiple paracenteses as well as abscess drainage of perihepatic fluid collections and treated with antimicrobial therapy but no positive cultures. Of note, biliary stricture also identified and ERCP performed on 2017 with sphincterotomy and biliary stent placed. Liver tests still have not normalized despite intervention, bilirubin and AP remain elevated. Other pertinent PMH current wound vac in place 2/2 wound infection, malnutrition requiring TPN for short course and ongoing hypoalbuminemia, and seizure activity while on prograf and has since been switched to cyclosporine without reoccurrence.  He presented to the ER for fever, abdominal pain, and N/V. He reports fever (103) for 1  day prior. Overnight, he developed N/V, reports small amount of green emesis with new left sided pain. He also endorses worsening SOB with exertion. He was scheduled as an outpatient for follow up paracentesis and IR drainage of fluid collection. CXR in ER shows large pleural effusion with subsegmental atelectasis. Infectious work up initiated in ER. His ANC is 900. Broad spectrum antibiotics initiated in ED. He denies chest pain, palpitations, diarrhea, constipation, or back pain. Denies any sick contacts.    Hospital Course:  Thoracentesis (1.2L off), Paracentesis (1.6L off), and IR drainage of fluid collection 1/11, all fluids negative for infection. ID consulted. Broad spectrum antibiotics d/c'd 1/15. Chronically malnourished with poor PO intake, prealbumin 7. Shane tube placed 1/15 for tube feedings.     ERCP 1/17 with sludge and a biliary stone which was removed and stent exchanged.  Remained with n/v; therefore, TF remained on hold and TPN continued.   Pt with fever s/p ERCP on 1/17 which continued until 1/19.  Vanc/cefepime restarted.  Blood cx 1/17 and 1/18 NGTD.  ID reconsulted.  Fungal markers sent.  CT C/A/P obtained.  With large R pleural effusion and ascites- both drained 1/19 and negative for infx per cell count. Pt afebrile 1/20. Liver biopsy 1/23 - without rejection.  EGD 1/25 - unremarkable for source of GI symptoms.  Resume diet along with other GI recommendations. SHANE tube placed for tube feeds.       2/22: Pt again spiked temp of 101 overnight, now down to 99. Resent Bcx overnight, prelim NGTD. Transitioned to Erta from vanc/zosyn per ID. Planning for tagged WBC scan on Monday. R sided abdominal erythema/dependent edema improving today.  TF have been on hold since 2/20. Will continue to hold today per pt request. Pt working on diet. KUB yesterday with significant stool and gas. Declines colace, miralax, metamucil, but willing to take daily bisacodyl. Pt agreeable to suppository today. If  suppository not successful can give mag citrate through dylan tonight. Will also retrial Marinol tonight as pt reported improvement in appetite previously. Will monitor closely for sedation.     2/23:  2 small BM 2/22.  Tolerated HD.  Haines removed.  Will attempt to eat this PM.      2/24:  Pt ate then vomited hamburger 2/23.  Will resume TPN.  D/C atovaquone, transition cyclo to Prograf, check Keppra level, increase Reglan to 0 mg, change twyla to BID for nausea.  Will give pentam today.    2/25:  TPN going well, pt ate 1/2 subway sandwhich without vomiting yesterday, will continue to monitor.  Plan to repeat EGD this week.  Pt with drop in H/h again and appears to be hemolyzing.  LDH elevated, hapto slightly low, LEE pending.  With neutropenia- Neupogen given.    2/26:  Pt with severe back pain s/p HD. Unclear if secondary to neupogen.  Repeat labs stable. Indium scan scheduled for 2/27- injection done today (back pain started prior). CT A/P obtained with fluid collection seen in pelvis.    2/27: Patient spiking fevers, 101. Indium scan, negative results. CT A/P obtained 2/26 with focal/loculated region of fluid in the lower right abdomen measuring approximately 8.7 x 5.5 cm. IR attempted drainage, however, per IR,there was  limited fluid interposed with bowel in the RLQ with no safe pocket seen for drainage.    2/28: Remains with low grade fevers. Neupogen given.     3/1: Patient with stuttering speech, trouble recalling specific words when speaking. Does have hx of seizure (on keppra) previously while on prograf. Switched to prograf 2/24 from cyclosporine. Neuro exam unremarkable. CT head without contrast obtained, unremarkable. In light of these symptoms, prograf d/c, cyclosporine restarted 3/1. Per transplant attending, held off on EGD at this time as concern anesthesia would put patient at further increase risk for seizure activity.     3/4: pt walked in room yesterday.  Had 1 BM.  No appetite still.  Cont TPN.   He reports swelling and back pain today.  He vomited after taking Oxy PO.  Fentanyl 12.5mg IV ordered.  Patient rested well afterward.  Remains afebrile.   ID following.   Cont Ertapenem.  Repeat blood and urine cx given continued malaise and stutter- blood cx prelim NGTD.  UA ok.  Urine cx pending.    Tentative plan for EGD on Monday to assess source of fevers/nausea/abdominal pain w bx for CMV and GVHD.  Will also reassess rather able to perform paracentesis.     3/5: no acute events overnight. Pt still not feeling well and with poor sleep overnight. Cont to have mumbling of speech at times but slightly better than before. Remains with abdominal pain but relieved with PO pain meds. Tolerated HD well today. Will plan for paracentesis and EGD tomorrow to assess for peritonitis and cause of persistent N/V respectively.     3/6: pt continues to have garbled/mumbled speech. EGD performed today and pt received sedation during procedure. Increased lethargy and dysarthria noted post procedure. VS stable. Paracentesis scheduled today as well to r/o peritonitis. Wbc count stable. Afebrile. Continue Ertapenem. HD yesterday and pt tolerated well, 2.5 L removed. Monitor.     3/7: MRI brain yesterday unremarkable. Blood cultures ngtd so far. Neurology consulted. EGD and Paracentesis yesterday. Para with 25 cc removed, wbc 28 segs 3%. Continue TPN. Monitor.    3/8: EUS and ERCP scheduled tomorrow. NPO after midnight. T bili continues trending up daily. Last ERCP on 2/19 with two stents placed. Ertapenem dc'd 3/7. Pt spiking temps today, tmax 100.9. Cefepime/vanc started. Oxycodone decreased to 5 mg q6h prn. Dc marinol and reglan for now. He continues to slur/stutter and have jerky movements throughout the day. Neurology following. EEG scheduled today. Monitor.     3/9: EUS/ERCP completed with choledoliathiasis.  Fever over night.  May need LP in future if mental status does not improve.      3/10- speech improved, neuro  suggesting lumbar puncture - schedule for Monday      Scheduled Meds:   bisacodyl  10 mg Oral Daily    ceFEPime (MAXIPIME) IVPB  1 g Intravenous Q12H    docusate sodium  100 mg Oral BID    epoetin maurisio (PROCRIT) injection  10,000 Units Intravenous Every Mon, Wed, Fri    ergocalciferol  50,000 Units Oral Q7 Days    fat emulsion 20%  250 mL Intravenous Once    heparin (porcine)  5,000 Units Subcutaneous Q8H    hydrocortisone sodium succinate  50 mg Intravenous Q8H    custom IVPB builder   Intravenous Q24H    levetiracetam oral soln  500 mg Oral BID    levothyroxine  75 mcg Oral Before breakfast    metronidazole  500 mg Intravenous Q8H    multivitamin  1 tablet Oral Daily    omeprazole  40 mg Oral QAM    polyethylene glycol  17 g Oral BID    psyllium husk (aspartame)  3.4 g Oral BID    sirolimus  1 mg Oral Daily    ursodiol  300 mg Oral BID     Continuous Infusions:   TPN ADULT CENTRAL LINE CUSTOM 48 mL/hr at 03/10/18 2219    TPN ADULT CENTRAL LINE CUSTOM       PRN Meds:sodium chloride, sodium chloride 0.9%, acetaminophen, albuterol-ipratropium 2.5mg-0.5mg/3mL, bisacodyl, dextrose 50%, dextrose 50%, diphenhydrAMINE-zinc acetate 1-0.1%, glucagon (human recombinant), glucose, glucose, naloxone, ondansetron, ondansetron, oxyCODONE, prochlorperazine, simethicone, sodium chloride 0.9%, sodium chloride 0.9%    Review of Systems   Unable to perform ROS: Mental status change     Objective:     Vital Signs (Most Recent):  Temp: 98.5 °F (36.9 °C) (03/11/18 0817)  Pulse: 87 (03/11/18 0817)  Resp: 16 (03/11/18 0817)  BP: 129/81 (03/11/18 0817)  SpO2: (!) 94 % (03/11/18 0817) Vital Signs (24h Range):  Temp:  [98.4 °F (36.9 °C)-100.1 °F (37.8 °C)] 98.5 °F (36.9 °C)  Pulse:  [] 87  Resp:  [16-18] 16  SpO2:  [94 %-97 %] 94 %  BP: (129-146)/(77-93) 129/81     Weight: 74.2 kg (163 lb 9.3 oz)  Body mass index is 26.4 kg/m².    Intake/Output - Last 3 Shifts       03/09 0700 - 03/10 0659 03/10 0700 - 03/11 0659  03/11 0700 - 03/12 0659    P.O. 0 0     Other 550      IV Piggyback  300     TPN  885.6     Total Intake(mL/kg) 550 (7.4) 1185.6 (16)     Urine (mL/kg/hr)  0 (0)     Emesis/NG output  0 (0)     Other 2850 (1.6) 0 (0)     Stool  0 (0)     Blood  0 (0)     Total Output 2850 0      Net -2300 +1185.6             Urine Occurrence  0 x     Stool Occurrence 0 x 0 x     Emesis Occurrence  0 x           Physical Exam   Constitutional: He appears well-developed. He appears lethargic.   HENT:   Head: Normocephalic.   Eyes: Pupils are equal, round, and reactive to light. Scleral icterus is present.   Cardiovascular: Normal rate, regular rhythm and intact distal pulses.    Pulmonary/Chest: Effort normal and breath sounds normal.   Abdominal: Bowel sounds are normal. He exhibits distension. There is tenderness.   Musculoskeletal: Normal range of motion. He exhibits edema.   Neurological: He appears lethargic. GCS eye subscore is 3. GCS verbal subscore is 3. GCS motor subscore is 5.   Skin: Skin is warm and dry.       Laboratory:  Immunosuppressants         Stop Route Frequency     sirolimus tablet 1 mg      -- Oral Daily     cycloSPORINE modified (NEORAL) 25 MG capsule      03/09 1714       cycloSPORINE modified (NEORAL) 100 MG capsule      03/09 1714       cycloSPORINE modified (NEORAL) 100 MG capsule      03/09 0514       cycloSPORINE modified (NEORAL) 25 MG capsule      03/09 0514          CBC:     Recent Labs  Lab 03/11/18  0530   WBC 8.48   RBC 2.47*   HGB 7.7*   HCT 23.5*      MCV 95   MCH 31.2*   MCHC 32.8     CMP:     Recent Labs  Lab 03/11/18  0530      CALCIUM 9.6   ALBUMIN 1.8*   PROT 5.7*      K 3.9   CO2 21*      BUN 60*   CREATININE 3.9*   ALKPHOS 128   ALT 7*   AST 42*   BILITOT 1.8*     Coagulation: No results for input(s): PT, INR, APTT in the last 168 hours.  Labs within the past 24 hours have been reviewed.    Diagnostic Results:  None    Assessment/Plan:     Fever    - Started on  vanc/cefepime for ERCP. Now transitioned to vanc/zosyn.  - Blood cultures sent, prelim NGTD. Repeat blood cultures and chest xray today 2/10.  - thoracentesis 2/1 - fluid negative for infection.  - atelectasis noted on ct scan - start breathing tx & CPT.  - now cmv positive - started treatment 2/5/18.  - taken to OR 2/7 for exp lap - fluid collections drained, cultures pending.  - Patient with low grade fever 2/15, 100.3.  - CT A/P 2/15 with no evidence of IA fluid infection. Did show R pleural effusion s/p thoracentesis, cell count negative for infection, cx pending.  - Repeat blood cx 2/15 prelim NGTD, CMV PCR 2/15 negative   Complaining of abdominal pain. Is tender in RUQ.  - Remains with fevers.  - Bilirubin remains elevated. Concerning for cholangitis. Antibiotics broadened 2/17. ID re consulted.   - ERCP  2/19, overall unremarkable findings. Did place new larger stents. Will need repeat ERCP in 10 weeks. Bilirubin is stable post ERCP.  -Tmax 101 2/21 PM again ID following. Plan to transition vanc/zosyn to ertapenem 2/22 as no improvement noted.   - Erta started 2/22.  - CT A/P obtained 2/26 with focal/loculated region of fluid in the lower right abdomen measuring approximately 8.7 x 5.5 cm.   - IR attempted drainage on fluid collection on 2/27, however, per IR,there was limited fluid interposed with bowel in the RLQ with no safe pocket seen for drainage.  -Tagged WBC scan 2/27, negative results.  - Remains with low grade fevers.   - Per transplant attending, will hold off on EGD at this time as concern anesthesia will put patient at further increase risk for seizure activity. Will plan for potenial EGD on Monday. Discussed with GI, appreciate their assistance.  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 EBV PCR negative. Adenovirus pending. Repeat CMV PCR negative.    - Back pain and abdominal pain persists. Last BM 3/5 with brown bomb.   - ID following. Cont Ertapenem dc'd 3/7 - due to AMS   - tmax  101.1 overnight 3/8 - Cefepime/vanc restarted for procedures. Monitor.           Severe protein-calorie malnutrition    - Poor PO intake since transplant requiring short course of TPN during previous hospital stay.   - albumin remains decreased but appetite slowly improving per pt.   - Dietary consulted. Will need to closely monitor PO intake.   - supplements also ordered.   - SHANE with tube feeding start 1/16. Stopped 1/17/18, was not tolerating. Hawthorne removed 1/20.  - prealbumin 7 on 1/15.  - TPN started 1/16/18.   - Pt able to eat minimally 1/21 and 1/22 which is an improvement.  - GI consulted - EGD reviewed, increased ppi bid, miralax bid, and metamucil bid, decreased narcotics.  - continue reglan, placed SHANE tube with tube feeds - transitioned to nightly feeds. Not tolerating well due to nausea.   - small bowel follow through with normal findings.   - consulted gi for possible gj tube placement, holding off on this for now as with ongoing fevers.  - Dietary changed tube feeds back to 24 hours from nightly 2/19 as not eating enough during the day to meet caloric needs. TF on hold since 2/20.  - retrial marinol 2/22 as pt reports that's the only thing helping his appetite, in the evening 2/2 concern for possible drowsiness  - Pt then developed persistent N/V again and TPN started 2/24. Cont TPN for now.   - Pt able to eat 1/4 sandwich without vomiting 2/25.  Not been able to eat anything more than that in 48 hours per pt and mother.    - EGD 3/6 with normal esophagus    - congested, erythematous and nodular mucosa in the stomach.    - biliary stent in the duodenum    - one duodenal ulcer with a clean ulcer base (Emir Class III).    - biopsies taken in the duodenal bulb and second and third portion of the duodenum.  - hold reglan for neuro symptoms.        Acute renal failure with tubular necrosis    - HD resumed on previous admission. Now anuric and dialyzes M-W-F.  - Nephrology following.    - Ktm consulted for  possible kidney transplant.        Other cytomegaloviral diseases    - detected at 370 on CMV PCR 2/1.  - case discussed with ID and will hold off on treatment at this time given low WBC.   - undetected CMV PCR 2/8.  - continue Valcyte 200 mg every Mon, Wed, Fri.  - CMV PCR 2/15 negative.  - Per ID, valcyte dose decreased to ppx dose on 2/18.  - CMV PCR 2/22 negative.   - CMV PCR 3/8 pending.  - Repeat scope 3/6 to assess persistent N/V and possible CMV causing these symptoms. Biopsies pending.        Liver transplanted    - Post op course complicated by fevers and hyperbilirubinemia.  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  - Liver US 1/8 showed 3.9 cm complex fluid collection anterior to right lobe and moderate nonspecific complex ascites inferior to transplant.   - IR placed drain 1/11/18, cell count negative for infection.  - PBS consulted. ERCP 1/17 with stone and sludge. Pt placed on Actigall - treated with 10 day course of abx.   - Bili elevated - liver u/s 1/28.  - Liver biopsy 1/23 without rejection.  - consulted PBS for ERCP - Choledocholithiasis was found along with sludge, stents placed 1/30.   - ERCP 2/19, overall unremarkable findings. Did place new larger stents. Will need repeat ERCP in 10 weeks. Bilirubin improving.  - Liver US reviewed w mod ascites.  Stent in CBD.    - Paracentesis 3/6 negative for infection, wbc 28 segs 3%.  - ERCP, EUS biopsy today - pending.        Constipation    - Enema ordered 1/20 and 1/21 with BM.  - Encourage ambulation.  - Decrease narcotics.  - D/c metamucil and miralax bid as makes patient nauseated.  - Increase reglan qid.  - Small bowel follow through with normal findings.   - Schedule bisacodyl for bowel regimen.   - KUB 2/21 with significant amounts of stool and gas. Pt declines miralax, metamucil, colace.   - encouraged suppository.  - Improved on KUB 2/23.  - Pt moving bowels now but will have to keep aggressive bowel regimen on board.    - Brown bomb 3/5 with BM x 1.  - KUB 3/8 with findings concerning for a partial bowel obstruction.        Long-term use of immunosuppressant medication    - Maintenance IS with cyclosporine --> transitioned to Prograf 2/24 for continued nausea. However now with neurological symptoms with prograf and hx of seizure, prograf d/c and cyclosporine restarted 3/1.  - MMF on hold for infections and neutropenia.   - d/c cyclosporine start rapa, monitor.        Prophylactic immunotherapy    - See long term use of immunosuppression.         Anemia of chronic disease    - H&H stable. Cont to monitor with daily cbc.         Recurrent pleural effusion on right    - CXR in ER with large right pleural effusion with subjective c/o worsening SOB.  - thoracentesis completed- 1200 ml removed.  SOB with significant improvement.   - Cell count reviewed and negative for infection.   - resp even and non labored.  O2 sat 94-98%.  - Repeat thora 1/19 negative for infection per cell count.  - Reaccumulating fluid on xray 1/21.  - thoracentesis 1/31 - 1.4L removed, negative for infection.  - Reaccumulating fluid again on xray 2/10.   - Thoracentesis performed  2/16 with 1500 cc off, cell count neg for infection, cx no growth to date.   - CT A/P 2/26 with large R pleural effusion, may need repeat thora if fevers reoccur.          Biliary stricture of transplanted liver    - tbili with increase prompting ERCP 1/30 with stones and sludge, stents placed.  - Repeat ERCP 2/19 as was having recurrent fevers with bilirubin stuck at 2.0-2.2 with overall unremarkable findings seen. Did place new larger stents. Bilirubin was improving. Now trending up daily again. Needed repeat ERCP in 10 weeks.   - US 3/2 stent in CBD.  - ercp/eus 3/9 with mental stent placed in bile duct, stones removed        Seizure    - Patient with stuttering speech, trouble recalling specific words when speaking. Does have hx of seizure (on keppra) previously while on prograf.  Switched to prograf over the weekend from cyclosporine. Neuro exam unremarkable.   - CT head without contrast obtained, unremarkable.   - In light of these symptoms, prograf d/c, restart cyclosporine 3/1.  - remains w/o seizure. Cont to have stutter/garbled speech/difficulty finding words.  Worse post EGD as sedation was given.  - MRI 3/6 unremarkable.  - EEG scheduled today 3/8.  - monitor closely. VSS.          At risk for opportunistic infections    - CMV detected 2/1 now on tx dose valcyte. CMV PCR 2/15 not detected. CMV PCR 2/22, negative.  - Repeat CMV PCR 3/1 negative.  CMV PCR pending today.  - Pentam given 2/24.            Delayed surgical wound healing    - wd vac removed 1/12/18.  Wound healing well. Aquacel AG and Mepilex border dressing applied via wound care recs.           Vitamin D deficiency    - start ergo 50K weekly.           Other neutropenia    - WBC stable today.   - Neupogen last given 3/1.           Other ascites    - Paracentesis performed 1/11/18 with 1600 ml removed.    - fluid negative for infection.   - Repeat para 1/19 negative for infection per cell count.  - no fluid seen for paracentesis on 1/23.  - Para 3/6 (diagnostic) negative for infection; wbc 28 segs 3%.        Nausea and vomiting    - h/o constipation, reports having regular BM's with bowel regimen.  - vomiting episode x 1 at home. Anti-emetics ordered PRN.  - KUB repeated 1/14/18 given increased abdominal pain- mild, generalized bowel distention suggesting ileus.  Diet changed to clears as tolerated for bowel rest .  - SHANE tube placed 1/16 for tube feeds. Not tolerating tube feeds, stopped 1/17/18.  - D/C shane 1/19, now in place as of 1/25.  - chronic constipation, Cont bowel regimen.   - See protein calorie malnutrition.  - TF have been on hold since 2/20 for N/V. shane d/c'd 2/23 as not tolerating TF  - 2/24 changes: Increased Reglan to 10 mg, D/C atovaquone, transitioned cyclo to Prograf, change twyla to BID, check Keppra  level  - GI consulted. EGD 3/6 as with continued nausea/vomiting with po intake. Appreciate GI recs.    - Cont to have nausea and vomiting w medications and eating.  Cont TPN.     - holding reglan 3/8 due to neurologic symptoms.                VTE Risk Mitigation         Ordered     heparin (porcine) injection 5,000 Units  Every 8 hours     Route:  Subcutaneous        03/08/18 1622     Medium Risk of VTE  Once      01/11/18 0351     Place sequential compression device  Until discontinued      01/11/18 0351          The patients clinical status was discussed at multidisplinary rounds, involving transplant surgery, transplant medicine, pharmacy, nursing, nutrition, and social work    Discharge Planning:  Monitor kidney function --> may need outpt HD  Monitor HH needs vs rehab for deconditioned status      Seda Camacho, NP  Liver Transplant  [facility]

## 2018-03-11 NOTE — PLAN OF CARE
Problem: Patient Care Overview  Goal: Plan of Care Review  Outcome: Ongoing (interventions implemented as appropriate)  Pt with Tmax of 100.9 this shift. PRN oxy 5mg given with one dose given early per M. Voltz. Pt sat up on window seat for an hour this afternoon. TPN infusing at 48 ml/hr. LP to be done possibly Monday to rule out infection. Pt free from falls and injury throughout shift. PRN zofran and compazine given for nausea and vomiting.

## 2018-03-11 NOTE — ASSESSMENT & PLAN NOTE
- Maintenance IS with cyclosporine --> transitioned to Prograf 2/24 for continued nausea. However now with neurological symptoms with prograf and hx of seizure, prograf d/c and cyclosporine restarted 3/1.  - MMF on hold for infections and neutropenia.   - d/c cyclosporine start rapa, monitor.

## 2018-03-12 ENCOUNTER — ANESTHESIA EVENT (OUTPATIENT)
Dept: SURGERY | Facility: HOSPITAL | Age: 28
End: 2018-03-12

## 2018-03-12 PROBLEM — D70.8 OTHER NEUTROPENIA: Status: RESOLVED | Noted: 2018-02-25 | Resolved: 2018-03-12

## 2018-03-12 PROBLEM — R18.8 OTHER ASCITES: Status: RESOLVED | Noted: 2018-01-11 | Resolved: 2018-03-12

## 2018-03-12 LAB
1,25(OH)2D3 SERPL-MCNC: 10 PG/ML
ABO + RH BLD: NORMAL
ALBUMIN SERPL BCP-MCNC: 1.9 G/DL
ALP SERPL-CCNC: 111 U/L
ALT SERPL W/O P-5'-P-CCNC: 6 U/L
ANION GAP SERPL CALC-SCNC: 14 MMOL/L
ANISOCYTOSIS BLD QL SMEAR: SLIGHT
AST SERPL-CCNC: 29 U/L
BASOPHILS NFR BLD: 0 %
BILIRUB SERPL-MCNC: 1.5 MG/DL
BLD GP AB SCN CELLS X3 SERPL QL: NORMAL
BLD PROD TYP BPU: NORMAL
BLOOD UNIT EXPIRATION DATE: NORMAL
BLOOD UNIT TYPE CODE: 5100
BLOOD UNIT TYPE: NORMAL
BUN SERPL-MCNC: 83 MG/DL
CALCIUM SERPL-MCNC: 9.6 MG/DL
CHLORIDE SERPL-SCNC: 105 MMOL/L
CO2 SERPL-SCNC: 19 MMOL/L
CODING SYSTEM: NORMAL
CREAT SERPL-MCNC: 4.5 MG/DL
CYCLOSPORINE BLD LC/MS/MS-MCNC: 98 NG/ML
DIFFERENTIAL METHOD: ABNORMAL
DISPENSE STATUS: NORMAL
EOSINOPHIL NFR BLD: 0 %
ERYTHROCYTE [DISTWIDTH] IN BLOOD BY AUTOMATED COUNT: 17.9 %
EST. GFR  (AFRICAN AMERICAN): 19.1 ML/MIN/1.73 M^2
EST. GFR  (NON AFRICAN AMERICAN): 16.5 ML/MIN/1.73 M^2
GLUCOSE SERPL-MCNC: 136 MG/DL
HCT VFR BLD AUTO: 21.8 %
HGB BLD-MCNC: 7.1 G/DL
HYPOCHROMIA BLD QL SMEAR: ABNORMAL
IMM GRANULOCYTES # BLD AUTO: ABNORMAL K/UL
IMM GRANULOCYTES NFR BLD AUTO: ABNORMAL %
INR PPP: 1.1
LYMPHOCYTES NFR BLD: 1 %
MAGNESIUM SERPL-MCNC: 2.3 MG/DL
MCH RBC QN AUTO: 30.9 PG
MCHC RBC AUTO-ENTMCNC: 32.6 G/DL
MCV RBC AUTO: 95 FL
MONOCYTES NFR BLD: 3 %
NEUTROPHILS NFR BLD: 91 %
NEUTS BAND NFR BLD MANUAL: 5 %
NRBC BLD-RTO: 0 /100 WBC
OVALOCYTES BLD QL SMEAR: ABNORMAL
PHOSPHATE SERPL-MCNC: 3.4 MG/DL
PLATELET # BLD AUTO: 154 K/UL
PLATELET BLD QL SMEAR: ABNORMAL
PMV BLD AUTO: 11.2 FL
POIKILOCYTOSIS BLD QL SMEAR: SLIGHT
POLYCHROMASIA BLD QL SMEAR: ABNORMAL
POTASSIUM SERPL-SCNC: 4.6 MMOL/L
PREALB SERPL-MCNC: 10 MG/DL
PROT SERPL-MCNC: 5.5 G/DL
PROTHROMBIN TIME: 11.3 SEC
RBC # BLD AUTO: 2.3 M/UL
SODIUM SERPL-SCNC: 138 MMOL/L
TRANS ERYTHROCYTES VOL PATIENT: NORMAL ML
WBC # BLD AUTO: 7.2 K/UL

## 2018-03-12 PROCEDURE — 84100 ASSAY OF PHOSPHORUS: CPT

## 2018-03-12 PROCEDURE — 90935 HEMODIALYSIS ONE EVALUATION: CPT

## 2018-03-12 PROCEDURE — 97110 THERAPEUTIC EXERCISES: CPT

## 2018-03-12 PROCEDURE — 83735 ASSAY OF MAGNESIUM: CPT

## 2018-03-12 PROCEDURE — 63600175 PHARM REV CODE 636 W HCPCS: Performed by: NURSE PRACTITIONER

## 2018-03-12 PROCEDURE — 99233 SBSQ HOSP IP/OBS HIGH 50: CPT | Mod: ,,, | Performed by: NURSE PRACTITIONER

## 2018-03-12 PROCEDURE — 25000003 PHARM REV CODE 250: Performed by: HOSPITALIST

## 2018-03-12 PROCEDURE — 63600175 PHARM REV CODE 636 W HCPCS: Mod: JG | Performed by: NURSE PRACTITIONER

## 2018-03-12 PROCEDURE — 90935 HEMODIALYSIS ONE EVALUATION: CPT | Mod: ,,, | Performed by: INTERNAL MEDICINE

## 2018-03-12 PROCEDURE — 25000003 PHARM REV CODE 250: Performed by: SURGERY

## 2018-03-12 PROCEDURE — 25000003 PHARM REV CODE 250: Performed by: PHYSICIAN ASSISTANT

## 2018-03-12 PROCEDURE — S0030 INJECTION, METRONIDAZOLE: HCPCS | Performed by: NURSE PRACTITIONER

## 2018-03-12 PROCEDURE — 84134 ASSAY OF PREALBUMIN: CPT

## 2018-03-12 PROCEDURE — B4185 PARENTERAL SOL 10 GM LIPIDS: HCPCS | Performed by: NURSE PRACTITIONER

## 2018-03-12 PROCEDURE — 80053 COMPREHEN METABOLIC PANEL: CPT

## 2018-03-12 PROCEDURE — 25000003 PHARM REV CODE 250: Performed by: NURSE PRACTITIONER

## 2018-03-12 PROCEDURE — A4217 STERILE WATER/SALINE, 500 ML: HCPCS | Performed by: NURSE PRACTITIONER

## 2018-03-12 PROCEDURE — 97116 GAIT TRAINING THERAPY: CPT

## 2018-03-12 PROCEDURE — 20600001 HC STEP DOWN PRIVATE ROOM

## 2018-03-12 PROCEDURE — 63600175 PHARM REV CODE 636 W HCPCS: Mod: JG | Performed by: SURGERY

## 2018-03-12 PROCEDURE — 85610 PROTHROMBIN TIME: CPT

## 2018-03-12 PROCEDURE — P9021 RED BLOOD CELLS UNIT: HCPCS

## 2018-03-12 PROCEDURE — 99233 SBSQ HOSP IP/OBS HIGH 50: CPT | Mod: ,,, | Performed by: INTERNAL MEDICINE

## 2018-03-12 PROCEDURE — 99900035 HC TECH TIME PER 15 MIN (STAT)

## 2018-03-12 PROCEDURE — 86920 COMPATIBILITY TEST SPIN: CPT

## 2018-03-12 PROCEDURE — 80158 DRUG ASSAY CYCLOSPORINE: CPT

## 2018-03-12 PROCEDURE — 86901 BLOOD TYPING SEROLOGIC RH(D): CPT

## 2018-03-12 PROCEDURE — 99233 SBSQ HOSP IP/OBS HIGH 50: CPT | Mod: ,,, | Performed by: PSYCHIATRY & NEUROLOGY

## 2018-03-12 PROCEDURE — 97535 SELF CARE MNGMENT TRAINING: CPT

## 2018-03-12 PROCEDURE — 97803 MED NUTRITION INDIV SUBSEQ: CPT | Performed by: DIETITIAN, REGISTERED

## 2018-03-12 PROCEDURE — 94664 DEMO&/EVAL PT USE INHALER: CPT

## 2018-03-12 RX ORDER — SODIUM CHLORIDE 9 MG/ML
INJECTION, SOLUTION INTRAVENOUS
Status: DISCONTINUED | OUTPATIENT
Start: 2018-03-12 | End: 2018-03-18

## 2018-03-12 RX ORDER — BISACODYL 10 MG
10 SUPPOSITORY, RECTAL RECTAL ONCE
Status: COMPLETED | OUTPATIENT
Start: 2018-03-12 | End: 2018-03-12

## 2018-03-12 RX ORDER — OXYCODONE HYDROCHLORIDE 5 MG/1
5 TABLET ORAL EVERY 6 HOURS PRN
Status: DISCONTINUED | OUTPATIENT
Start: 2018-03-12 | End: 2018-03-13

## 2018-03-12 RX ORDER — TRAMADOL HYDROCHLORIDE 50 MG/1
TABLET ORAL
Status: DISCONTINUED
Start: 2018-03-12 | End: 2018-03-13 | Stop reason: WASHOUT

## 2018-03-12 RX ORDER — HEPARIN SODIUM 1000 [USP'U]/ML
1000 INJECTION, SOLUTION INTRAVENOUS; SUBCUTANEOUS
Status: DISCONTINUED | OUTPATIENT
Start: 2018-03-12 | End: 2018-04-10 | Stop reason: HOSPADM

## 2018-03-12 RX ORDER — SODIUM CHLORIDE 9 MG/ML
INJECTION, SOLUTION INTRAVENOUS ONCE
Status: COMPLETED | OUTPATIENT
Start: 2018-03-12 | End: 2018-03-12

## 2018-03-12 RX ORDER — HYDROCODONE BITARTRATE AND ACETAMINOPHEN 500; 5 MG/1; MG/1
TABLET ORAL
Status: DISCONTINUED | OUTPATIENT
Start: 2018-03-12 | End: 2018-03-18

## 2018-03-12 RX ORDER — TRAMADOL HYDROCHLORIDE 50 MG/1
50 TABLET ORAL EVERY 6 HOURS PRN
Status: DISCONTINUED | OUTPATIENT
Start: 2018-03-13 | End: 2018-03-13

## 2018-03-12 RX ADMIN — HEPARIN SODIUM 1000 UNITS: 1000 INJECTION, SOLUTION INTRAVENOUS; SUBCUTANEOUS at 11:03

## 2018-03-12 RX ADMIN — OMEPRAZOLE 40 MG: 40 CAPSULE, DELAYED RELEASE ORAL at 07:03

## 2018-03-12 RX ADMIN — SIMETHICONE CHEW TAB 80 MG 80 MG: 80 TABLET ORAL at 10:03

## 2018-03-12 RX ADMIN — SODIUM CHLORIDE: 9 INJECTION, SOLUTION INTRAVENOUS at 01:03

## 2018-03-12 RX ADMIN — HYDROCORTISONE SODIUM SUCCINATE 50 MG: 100 INJECTION, POWDER, FOR SOLUTION INTRAMUSCULAR; INTRAVENOUS at 05:03

## 2018-03-12 RX ADMIN — OXYCODONE HYDROCHLORIDE 5 MG: 5 TABLET ORAL at 03:03

## 2018-03-12 RX ADMIN — LEVOTHYROXINE SODIUM 75 MCG: 75 TABLET ORAL at 06:03

## 2018-03-12 RX ADMIN — CEFEPIME 1 G: 1 INJECTION, POWDER, FOR SOLUTION INTRAMUSCULAR; INTRAVENOUS at 05:03

## 2018-03-12 RX ADMIN — ONDANSETRON HYDROCHLORIDE 4 MG: 2 INJECTION, SOLUTION INTRAMUSCULAR; INTRAVENOUS at 09:03

## 2018-03-12 RX ADMIN — DOCUSATE SODIUM 100 MG: 100 CAPSULE, LIQUID FILLED ORAL at 09:03

## 2018-03-12 RX ADMIN — URSODIOL 300 MG: 300 CAPSULE ORAL at 12:03

## 2018-03-12 RX ADMIN — URSODIOL 300 MG: 300 CAPSULE ORAL at 09:03

## 2018-03-12 RX ADMIN — METRONIDAZOLE 500 MG: 500 INJECTION, SOLUTION INTRAVENOUS at 12:03

## 2018-03-12 RX ADMIN — LEVETIRACETAM 500 MG: 100 SOLUTION ORAL at 12:03

## 2018-03-12 RX ADMIN — SODIUM CHLORIDE: 9 INJECTION, SOLUTION INTRAVENOUS at 08:03

## 2018-03-12 RX ADMIN — LEVETIRACETAM 500 MG: 100 SOLUTION ORAL at 10:03

## 2018-03-12 RX ADMIN — SIROLIMUS 1 MG: 1 TABLET, SUGAR COATED ORAL at 12:03

## 2018-03-12 RX ADMIN — PROCHLORPERAZINE EDISYLATE 10 MG: 5 INJECTION INTRAMUSCULAR; INTRAVENOUS at 05:03

## 2018-03-12 RX ADMIN — ERYTHROPOIETIN 10000 UNITS: 10000 INJECTION, SOLUTION INTRAVENOUS; SUBCUTANEOUS at 11:03

## 2018-03-12 RX ADMIN — OXYCODONE HYDROCHLORIDE 5 MG: 5 TABLET ORAL at 11:03

## 2018-03-12 RX ADMIN — MAGNESIUM SULFATE HEPTAHYDRATE: 500 INJECTION, SOLUTION INTRAMUSCULAR; INTRAVENOUS at 09:03

## 2018-03-12 RX ADMIN — BISACODYL 10 MG: 5 TABLET, COATED ORAL at 12:03

## 2018-03-12 RX ADMIN — SOYBEAN OIL 250 ML: 20 INJECTION, SOLUTION INTRAVENOUS at 09:03

## 2018-03-12 RX ADMIN — HEPARIN SODIUM 5000 UNITS: 5000 INJECTION, SOLUTION INTRAVENOUS; SUBCUTANEOUS at 09:03

## 2018-03-12 RX ADMIN — OXYCODONE HYDROCHLORIDE 5 MG: 5 TABLET ORAL at 04:03

## 2018-03-12 RX ADMIN — BISACODYL 10 MG: 10 SUPPOSITORY RECTAL at 10:03

## 2018-03-12 RX ADMIN — HYDROCORTISONE SODIUM SUCCINATE 50 MG: 100 INJECTION, POWDER, FOR SOLUTION INTRAMUSCULAR; INTRAVENOUS at 02:03

## 2018-03-12 RX ADMIN — HYDROCORTISONE SODIUM SUCCINATE 50 MG: 100 INJECTION, POWDER, FOR SOLUTION INTRAMUSCULAR; INTRAVENOUS at 09:03

## 2018-03-12 RX ADMIN — OXYCODONE HYDROCHLORIDE 5 MG: 5 TABLET ORAL at 09:03

## 2018-03-12 RX ADMIN — METRONIDAZOLE 500 MG: 500 INJECTION, SOLUTION INTRAVENOUS at 09:03

## 2018-03-12 NOTE — PROGRESS NOTES
[facility]  Infectious Disease  Progress Note    Patient Name: Jhonny Diana  MRN: [id number]  Admission Date: 1/11/2018  Length of Stay: 60 days  Attending Physician: Nathanael Medina MD  Primary Care Provider: Primary Doctor No    Isolation Status: No active isolations  Assessment/Plan:      Fever    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+, steroid induction, on maintenance tacro/MMF/pred; c/b seizures, LAURA, superificial wound infection s/p wound vac to [facility] through 1/12, and several recent admissions on 11/24 and 12/3 with culture negative peritonitis with peak ascites WBC ~5k due to suspected indolent biliary leakage s/p sphincterotomy/biliary stent placement over CBD stricture on 12/6 ERCP vs another IA source) who was admitted on 1/11/2017 with acute onset fevers and acute on chronic N/V/abdominal pain (RLQ worst) and was found to have a loculated RLQ collection on imaging (not safe for percutaneous drainage), for which, after failure with more conservative treatments, he ultimately underwent laparoscopic drainage on 2/7/2018 (with dark brown fluid drained, culture negative but after extensive antibiotic exposure) but without complete resolution. He achieved defervescence but had persistent N/V/abdominal pain on empiric ertapenem with persistent loculated fluid noted on imaging that is not accessible to percutaneous drainage. 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 His recent course over the past week was c/b AMS/neurologic symptoms seemingly due to a drug effect (as they have improved today following recent cessation of ertapenem and CSA) and recrudescent fevers after transition off carbapenem that have finally stopped the last 24h. Given his german improvement the last 24h and concern for drug neurotoxicity (erta vs CSA), will hold off on LP for the moment while observing him closely.     - would continue empiric vanc/cefepime/flagyl for now   - additional prophylaxis per protocol (valcyte, atovaquone given bactrim allergy, and isavuconazole given complex course)  - await ulcer biopsy results   - will hold on LP for now -- if AMS/fevers recur, would pursue            Anticipated Disposition: pending improvement    Thank you for your consult. I will follow-up with patient. Please contact us if you have any additional questions.     Melanie Bergman MD  Transplant ID Attending  [id number]    Melaine Bergman MD  Infectious Disease  [facility]    Subjective:     Principal Problem:Fever    HPI: No notes on file  Interval History: Reverted to normal mentation and alertness last night per patient and mother. Has appetite and afebrile over >24h. Walking in room/halls.    Review of Systems   Constitutional: Negative for activity change, appetite change, chills, fatigue and fever.   HENT: Negative for congestion and facial swelling.    Eyes: Negative for pain, discharge and visual disturbance.   Respiratory: Negative for cough, chest tightness, shortness of breath and wheezing.    Cardiovascular: Negative for chest pain, palpitations and leg swelling.   Gastrointestinal: Positive for abdominal distention, abdominal pain and nausea. Negative for constipation and vomiting.   Endocrine: Negative.    Genitourinary:         Anuric   Musculoskeletal: Negative for arthralgias, back pain and myalgias.   Skin: Positive for wound. Negative for color change and rash.   Allergic/Immunologic: Positive for immunocompromised state.   Neurological: Negative for dizziness, seizures, weakness and headaches.   Psychiatric/Behavioral: Negative for confusion, decreased concentration and dysphoric mood. The patient is not nervous/anxious.    All other systems reviewed and are negative.      Objective:     Vital Signs (Most Recent):  Temp: 99.2 °F (37.3 °C) (02/17/18 1542)  Pulse: 90 (02/17/18 1542)  Resp: 16 (02/17/18 1542)  BP: 135/85 (02/17/18 1542)  SpO2: 95 % (02/17/18 1542) Vital Signs (24h Range):  Temp:  [98.6 °F (37 °C)-101.2 °F (38.4 °C)] 99.2 °F (37.3 °C)  Pulse:  [84-92] 90  Resp:  [16-20] 16  SpO2:  [92 %-96 %] 95 %  BP: (128-137)/(75-85) 135/85     Weight: 60.2 kg (132 lb 11.5 oz)  Body mass index is 21.42 kg/m².    Estimated Creatinine Clearance: 26.8 mL/min (A) (based on SCr of 3.5 mg/dL (H)).    Physical Exam   Constitutional: He is oriented to person, place, and time. He appears well-developed. No distress.   Temporal and distal extremity muscle wasting   HENT:   Head: Normocephalic and atraumatic.   Mouth/Throat: No oropharyngeal exudate.   dylan tube   Eyes: EOM are normal. Pupils are equal, round, and reactive to light. No scleral icterus.   Neck: Normal range of motion. Neck supple. No JVD present. No thyromegaly present.   Cardiovascular: Normal rate, regular rhythm, normal heart sounds and intact distal pulses.    No murmur heard.  Pulmonary/Chest: Effort normal. No respiratory distress. He has no wheezes. He exhibits no tenderness.   Diminished to RLL   Abdominal: Soft. Bowel sounds are normal. He exhibits ascites. He exhibits no distension. There is tenderness. There is no rebound and no guarding.   Dressing to chevron.  Wd vac removed 1/12/18  Dependent edema present R flank; surrounding erythema   Musculoskeletal: Normal range  of motion. He exhibits no tenderness. Edema: 2+ LE edema.   Neurological: He is alert and oriented to person, place, and time. He has normal reflexes.   Skin: Skin is warm and dry. He is not diaphoretic. No erythema.   Psychiatric: His mood appears not anxious.   Nursing note and vitals reviewed.      Significant Labs:   CBC:     Recent Labs  Lab 02/16/18  0436 02/17/18  0343   WBC 2.85* 2.23*   HGB 8.8* 7.8*   HCT 26.8* 23.9*    134*     CMP:     Recent Labs  Lab 02/16/18  0436 02/17/18  0343   * 137   K 4.0 3.8    105   CO2 23 23   GLU 84 81   BUN 23* 13   CREATININE 5.1* 3.5*   CALCIUM 9.5 8.8   PROT 5.6* 5.1*   ALBUMIN 2.0* 1.8*   BILITOT 2.2* 2.1*   ALKPHOS 125 111   AST 25 18   ALT <5* <5*   ANIONGAP 11 9   EGFRNONAA 14.2* 22.4*       Significant Imaging: I have reviewed all pertinent imaging results/findings within the past 24 hours.     CT CAP:   Post surgical changes prior hepatic transplant.  Percutaneous surgical drain with notable decrease in size of fluid collection in lower abdomen.  Small amount of fluid tracking elsewhere within the abdomen and pelvis.  Large volume right pleural fluid, increased in size since prior exam, with associated right lower lobe atelectasis.  Diffuse body wall edema.  Small pericardial effusion.    Microbiology:  1/11 blood cx: negative  1/11 pleural fluid cx: negative  1/11 ascites cx: negative  1/17 blood cx: negative  1/18 blood cx: negative  1/19 pleural fluid cx: NGTD  1/19 ascites cx: NGTD  2/1 RVP negative  2/7 OR cx: NGTD  2/11 blood cx: negative  2/15 blood cx: negative  2/16 pleural fluid cx: negative

## 2018-03-12 NOTE — SUBJECTIVE & OBJECTIVE
Interval History: Reverted to normal mentation and alertness last night per patient and mother. Has appetite and afebrile over >24h. Walking in room/halls.    Review of Systems   Constitutional: Negative for activity change, appetite change, chills, fatigue and fever.   HENT: Negative for congestion and facial swelling.    Eyes: Negative for pain, discharge and visual disturbance.   Respiratory: Negative for cough, chest tightness, shortness of breath and wheezing.    Cardiovascular: Negative for chest pain, palpitations and leg swelling.   Gastrointestinal: Positive for abdominal distention, abdominal pain and nausea. Negative for constipation and vomiting.   Endocrine: Negative.    Genitourinary:        Anuric   Musculoskeletal: Negative for arthralgias, back pain and myalgias.   Skin: Positive for wound. Negative for color change and rash.   Allergic/Immunologic: Positive for immunocompromised state.   Neurological: Negative for dizziness, seizures, weakness and headaches.   Psychiatric/Behavioral: Negative for confusion, decreased concentration and dysphoric mood. The patient is not nervous/anxious.    All other systems reviewed and are negative.      Objective:     Vital Signs (Most Recent):  Temp: 99.2 °F (37.3 °C) (02/17/18 1542)  Pulse: 90 (02/17/18 1542)  Resp: 16 (02/17/18 1542)  BP: 135/85 (02/17/18 1542)  SpO2: 95 % (02/17/18 1542) Vital Signs (24h Range):  Temp:  [98.6 °F (37 °C)-101.2 °F (38.4 °C)] 99.2 °F (37.3 °C)  Pulse:  [84-92] 90  Resp:  [16-20] 16  SpO2:  [92 %-96 %] 95 %  BP: (128-137)/(75-85) 135/85     Weight: 60.2 kg (132 lb 11.5 oz)  Body mass index is 21.42 kg/m².    Estimated Creatinine Clearance: 26.8 mL/min (A) (based on SCr of 3.5 mg/dL (H)).    Physical Exam   Constitutional: He is oriented to person, place, and time. He appears well-developed. No distress.   Temporal and distal extremity muscle wasting   HENT:   Head: Normocephalic and atraumatic.   Mouth/Throat: No oropharyngeal  exudate.   dylan tube   Eyes: EOM are normal. Pupils are equal, round, and reactive to light. No scleral icterus.   Neck: Normal range of motion. Neck supple. No JVD present. No thyromegaly present.   Cardiovascular: Normal rate, regular rhythm, normal heart sounds and intact distal pulses.    No murmur heard.  Pulmonary/Chest: Effort normal. No respiratory distress. He has no wheezes. He exhibits no tenderness.   Diminished to RLL   Abdominal: Soft. Bowel sounds are normal. He exhibits ascites. He exhibits no distension. There is tenderness. There is no rebound and no guarding.   Dressing to chevron.  Wd vac removed 1/12/18  Dependent edema present R flank; surrounding erythema   Musculoskeletal: Normal range of motion. He exhibits no tenderness. Edema: 2+ LE edema.   Neurological: He is alert and oriented to person, place, and time. He has normal reflexes.   Skin: Skin is warm and dry. He is not diaphoretic. No erythema.   Psychiatric: His mood appears not anxious.   Nursing note and vitals reviewed.      Significant Labs:   CBC:     Recent Labs  Lab 02/16/18  0436 02/17/18  0343   WBC 2.85* 2.23*   HGB 8.8* 7.8*   HCT 26.8* 23.9*    134*     CMP:     Recent Labs  Lab 02/16/18  0436 02/17/18  0343   * 137   K 4.0 3.8    105   CO2 23 23   GLU 84 81   BUN 23* 13   CREATININE 5.1* 3.5*   CALCIUM 9.5 8.8   PROT 5.6* 5.1*   ALBUMIN 2.0* 1.8*   BILITOT 2.2* 2.1*   ALKPHOS 125 111   AST 25 18   ALT <5* <5*   ANIONGAP 11 9   EGFRNONAA 14.2* 22.4*       Significant Imaging: I have reviewed all pertinent imaging results/findings within the past 24 hours.     CT CAP:   Post surgical changes prior hepatic transplant.  Percutaneous surgical drain with notable decrease in size of fluid collection in lower abdomen.  Small amount of fluid tracking elsewhere within the abdomen and pelvis.  Large volume right pleural fluid, increased in size since prior exam, with associated right lower lobe atelectasis.  Diffuse  body wall edema.  Small pericardial effusion.    Microbiology:  1/11 blood cx: negative  1/11 pleural fluid cx: negative  1/11 ascites cx: negative  1/17 blood cx: negative  1/18 blood cx: negative  1/19 pleural fluid cx: NGTD  1/19 ascites cx: NGTD  2/1 RVP negative  2/7 OR cx: NGTD  2/11 blood cx: negative  2/15 blood cx: negative  2/16 pleural fluid cx: negative

## 2018-03-12 NOTE — ASSESSMENT & PLAN NOTE
Hx of seizure 11/2/17 described as generalized convulsions while on prograf. Started on Keppra 500mg BID.   AMS with speech change, confusion and increased lethargy since 3/1 after prograf was restarted 2/24/18.     3/9/18-routine EEG negative for epileptiform discharges or focal irritability   3/12/18-mental status improved significantly. Able to walk down the jenkins with PT/OT  LP canceled    -continue Keppra 500 mg BID

## 2018-03-12 NOTE — PROGRESS NOTES
EZEKIEL spoke with Seda Camacho NP regarding pt's potential discharge in order to plan for new dialysis referral. Seda reports pt could potentially go by the end of the week, but nothing confirmed. EZEKIEL will initiate new referral to [facility] as pt now deemed ESRD and on chronic HD versus acute. EZEKIEL spoke with pt's mother who confirmed permission to initiate referral. Pt was napping at time.     EZEKIEL contacted [facility] ([phone]) and notified Chano that possibility pt could be d/c'd by end of week. Chano states there is space for pt and aware that pt will be deemed ESRD. Chano stated that SW to make initial referral with [facility]. EZEKIEL expressed understanding and will confirm with [facility] when pt actually being discharged. EZEKIEL remains available.     EZEKIEL faxed clinical to [facility] Intake ([phone]) and will follow up with  ([phone]) once referral processed. EZEKIEL remains available.

## 2018-03-12 NOTE — ASSESSMENT & PLAN NOTE
- Poor PO intake since transplant requiring short course of TPN during previous hospital stay.   - albumin remains decreased but appetite slowly improving per pt.   - Dietary consulted. Will need to closely monitor PO intake.   - supplements also ordered.   - SHANE with tube feeding start 1/16. Stopped 1/17/18, was not tolerating. Shane removed 1/20.  - prealbumin 7 on 1/15.  - TPN started 1/16/18.   - Pt able to eat minimally 1/21 and 1/22 which is an improvement.  - GI consulted - EGD reviewed, increased ppi bid, miralax bid, and metamucil bid, decreased narcotics.  - continue reglan, placed SHANE tube with tube feeds - transitioned to nightly feeds. Not tolerating well due to nausea.   - small bowel follow through with normal findings.   - consulted gi for possible gj tube placement, holding off on this for now as with ongoing fevers.  - Dietary changed tube feeds back to 24 hours from nightly 2/19 as not eating enough during the day to meet caloric needs. TF on hold since 2/20.  - retrial marinol 2/22 as pt reports that's the only thing helping his appetite, in the evening 2/2 concern for possible drowsiness  - Pt then developed persistent N/V again and TPN started 2/24. Cont TPN for now.   - Pt able to eat 1/4 sandwich without vomiting 2/25.  Not been able to eat anything more than that in 48 hours per pt and mother.    - EGD 3/6 with normal esophagus    - congested, erythematous and nodular mucosa in the stomach.    - biliary stent in the duodenum    - one duodenal ulcer with a clean ulcer base (Emir Class III).    - biopsies taken in the duodenal bulb and second and third portion of the duodenum.  - hold reglan for neuro symptoms.  - continue TPN, tolerating diet today, monitor

## 2018-03-12 NOTE — ASSESSMENT & PLAN NOTE
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, CMV D+/R+, steroid induction, on maintenance tacro/MMF/pred; c/b seizures, LAURA, superificial wound infection s/p wound vac to [facility] through 1/12, and several recent admissions on 11/24 and 12/3 with culture negative peritonitis with peak ascites WBC ~5k due to suspected indolent biliary leakage s/p sphincterotomy/biliary stent placement over CBD stricture on 12/6 ERCP vs another IA source) who was admitted on 1/11/2017 with acute onset fevers and acute on chronic N/V/abdominal pain (RLQ worst) and was found to have a loculated RLQ collection on imaging (not safe for percutaneous drainage), for which, after failure with more conservative treatments, he ultimately underwent laparoscopic drainage on 2/7/2018 (with dark brown fluid drained, culture negative but after extensive antibiotic exposure) but without complete resolution. He achieved defervescence but had persistent N/V/abdominal pain on empiric ertapenem with persistent loculated fluid noted on imaging that is not accessible to percutaneous drainage. 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 His recent course over the past week was c/b AMS/neurologic symptoms seemingly due to a drug effect (as they have improved today following recent cessation of ertapenem and  CSA) and recrudescent fevers after transition off carbapenem that have finally stopped the last 24h. Given his german improvement the last 24h and concern for drug neurotoxicity (erta vs CSA), will hold off on LP for the moment while observing him closely.     - would continue empiric vanc/cefepime/flagyl for now   - additional prophylaxis per protocol (valcyte, atovaquone given bactrim allergy, and isavuconazole given complex course)  - await ulcer biopsy results   - will hold on LP for now -- if AMS/fevers recur, would pursue

## 2018-03-12 NOTE — PLAN OF CARE
Problem: Patient Care Overview  Goal: Plan of Care Review    Recommendations     Recommendation/Intervention:   1. If to continue TPN, recommend increasing current TPN to 92g AA / 362g dex plus IV lipids daily will meet EEN and EPN.  2. Encourage PO intake. Tolerance seems slightly improved.  3. RD following.      Goals: 1. Pt will consume/ tolerate >75% of EEN and EPN  Nutrition Goal Status: progressing towards goal

## 2018-03-12 NOTE — PROGRESS NOTES
[facility] NOTE    Jhonny Diana is a 27 y.o. male currently on hemodialysis for removal of uremic toxins and volume.    Labs have been reviewed and the dialysate bath has been adjusted.    There are no symptoms of hypotension, chest pain, dyspnea, nausea or vomiting.    Labs:        Recent Labs  Lab 03/10/18  0524 03/11/18  0530 03/12/18  0530    137 138   K 3.7 3.9 4.6    105 105   CO2 23 21* 19*   BUN 42* 60* 83*   CREATININE 3.2* 3.9* 4.5*   CALCIUM 9.3 9.6 9.6   PHOS 2.4* 2.8 3.4         Recent Labs  Lab 03/10/18  0524 03/11/18  0530 03/12/18  0530   WBC 7.81 8.48 7.20   HGB 7.7* 7.7* 7.1*   HCT 23.2* 23.5* 21.8*    186 154       Assessment/Plan:    HD today for removal of uremic toxins and volume.

## 2018-03-12 NOTE — ASSESSMENT & PLAN NOTE
Slurred and mumbled speech reported since 3/1 with increased lethargy, confusion, arm movements while sleeping and gait difficulty.   Complicated hospital course with ATN requiring dialysis, superficial wound infection with wound vac, multiple admissions for fevers, peritonitis, persistent ascites, biliary strictures requiring ERCP and biliary stenting, pleural effusion s/p thoracentesis and malnutrition on TPN.    On exam, patient has decreased attention and concentration. Low and mumbled speech, but able to repeat phrases without difficulty or apparent receptive aphasia. Suspect mental status change on 3/1 is due to recent infection and possible s/e from prograf complicated by ICU delirium after long admission. CT head and MRI brain without contrast were unremarkable for acute intracranial pathology. LP was scheduled with IR for 3/12/18 to r/o CNS infection in immunocompromised patient, but canceled due to significant improvement in mentation today.    ->continue delirium precautions with good sleep hygiene and regulation of sleep/wake cycle. Limit interruptions in the middle of the night.   ->encouraged to limit sedating medications and narcotics as able

## 2018-03-12 NOTE — PLAN OF CARE
Problem: Patient Care Overview  Goal: Plan of Care Review  Outcome: Ongoing (interventions implemented as appropriate)  POC reviewed w/ pt and pt's mother this am to include dialysis, IV nutrition, possible blood transfusion, pain control, and increasing activity.  Pt has been very active today, worked w/ both PT and OT this afternoon - walking and exercises done.  Pt had dialysis this am 3.5 hr/3 L removed.  Pt voided 200 cc urine today.  Type and screen done, 1 unit PRBCs transfused this afternoon.  Pt remains on TPN at 48 cc/hr.  Pt was schedule for LP w/ diagnostic testing this afternoon but test cancelled. Pt c/o mild abdominal pain this afternoon, medicated w/ prn pain medication.  No fever, no N/V so far this shift.

## 2018-03-12 NOTE — SUBJECTIVE & OBJECTIVE
Scheduled Meds:   bisacodyl  10 mg Oral Daily    ceFEPime (MAXIPIME) IVPB  1 g Intravenous Q12H    docusate sodium  100 mg Oral BID    epoetin maurisio (PROCRIT) injection  10,000 Units Intravenous Every Mon, Wed, Fri    ergocalciferol  50,000 Units Oral Q7 Days    fat emulsion 20%  250 mL Intravenous Once    heparin (porcine)  5,000 Units Subcutaneous Q8H    hydrocortisone sodium succinate  50 mg Intravenous Q8H    custom IVPB builder   Intravenous Q24H    levetiracetam oral soln  500 mg Oral BID    levothyroxine  75 mcg Oral Before breakfast    metronidazole  500 mg Intravenous Q8H    multivitamin  1 tablet Oral Daily    omeprazole  40 mg Oral QAM    polyethylene glycol  17 g Oral BID    psyllium husk (aspartame)  3.4 g Oral BID    sirolimus  1 mg Oral Daily    ursodiol  300 mg Oral BID     Continuous Infusions:   TPN ADULT CENTRAL LINE CUSTOM 48 mL/hr at 03/11/18 2211    TPN ADULT CENTRAL LINE CUSTOM       PRN Meds:sodium chloride, sodium chloride, sodium chloride 0.9%, sodium chloride 0.9%, acetaminophen, albuterol-ipratropium 2.5mg-0.5mg/3mL, bisacodyl, dextrose 50%, dextrose 50%, diphenhydrAMINE-zinc acetate 1-0.1%, glucagon (human recombinant), glucose, glucose, heparin (porcine), naloxone, ondansetron, ondansetron, oxyCODONE, prochlorperazine, simethicone, sodium chloride 0.9%, sodium chloride 0.9%    Review of Systems   Constitutional: Positive for activity change, appetite change and fatigue.   Gastrointestinal: Positive for abdominal distention and abdominal pain.   Genitourinary: Positive for decreased urine volume.   Allergic/Immunologic: Positive for immunocompromised state.     Objective:     Vital Signs (Most Recent):  Temp: 98.1 °F (36.7 °C) (03/12/18 1247)  Pulse: 80 (03/12/18 1247)  Resp: 18 (03/12/18 1247)  BP: 116/62 (03/12/18 1247)  SpO2: (!) 94 % (03/12/18 1247) Vital Signs (24h Range):  Temp:  [97.8 °F (36.6 °C)-98.7 °F (37.1 °C)] 98.1 °F (36.7 °C)  Pulse:  [76-93] 80  Resp:   [15-18] 18  SpO2:  [93 %-95 %] 94 %  BP: (116-151)/(55-90) 116/62     Weight: 74.2 kg (163 lb 9.3 oz)  Body mass index is 26.4 kg/m².    Intake/Output - Last 3 Shifts       03/10 0700 - 03/11 0659 03/11 0700 - 03/12 0659 03/12 0700 - 03/13 0659    P.O. 0 20     Other   600    IV Piggyback 300 400     .6 921.6     Total Intake(mL/kg) 1185.6 (16) 1341.6 (18.1) 600 (8.1)    Urine (mL/kg/hr) 0 (0) 0 (0)     Emesis/NG output 0 (0) 0 (0)     Other 0 (0) 0 (0) 3600 (6.9)    Stool 0 (0) 0 (0)     Blood 0 (0) 0 (0)     Total Output 0 0 3600    Net +1185.6 +1341.6 -3000           Urine Occurrence 0 x 0 x     Stool Occurrence 0 x 0 x     Emesis Occurrence 0 x 0 x           Physical Exam   Constitutional: He is oriented to person, place, and time. He appears well-developed.   Hand and temporal muscle wasting   HENT:   Head: Normocephalic.   Eyes: Pupils are equal, round, and reactive to light. Scleral icterus is present.   Cardiovascular: Normal rate, regular rhythm, normal heart sounds and intact distal pulses.    Pulmonary/Chest: Effort normal and breath sounds normal.   Abdominal: Bowel sounds are normal. He exhibits distension. There is no tenderness.   Musculoskeletal: Normal range of motion. He exhibits edema.   Neurological: He is alert and oriented to person, place, and time. GCS eye subscore is 4. GCS verbal subscore is 5. GCS motor subscore is 6.   Skin: Skin is warm and dry.   Psychiatric: He has a normal mood and affect. His behavior is normal. Thought content normal.   Nursing note and vitals reviewed.      Laboratory:  Immunosuppressants         Stop Route Frequency     sirolimus tablet 1 mg      -- Oral Daily     cycloSPORINE modified (NEORAL) 25 MG capsule      03/09 1714       cycloSPORINE modified (NEORAL) 100 MG capsule      03/09 1714       cycloSPORINE modified (NEORAL) 100 MG capsule      03/09 0514       cycloSPORINE modified (NEORAL) 25 MG capsule      03/09 0514          CBC:     Recent Labs  Lab  03/12/18  0530   WBC 7.20   RBC 2.30*   HGB 7.1*   HCT 21.8*      MCV 95   MCH 30.9   MCHC 32.6     CMP:     Recent Labs  Lab 03/12/18  0530   *   CALCIUM 9.6   ALBUMIN 1.9*   PROT 5.5*      K 4.6   CO2 19*      BUN 83*   CREATININE 4.5*   ALKPHOS 111   ALT 6*   AST 29   BILITOT 1.5*     Coagulation:     Recent Labs  Lab 03/12/18  1004   INR 1.1     Labs within the past 24 hours have been reviewed.    Diagnostic Results:  None

## 2018-03-12 NOTE — SUBJECTIVE & OBJECTIVE
Subjective:     Interval History:   Mental status significantly improved today. Able to work with PT/OT and walk down the jenkins  Had dialysis this morning and received 1 unit PRBC this afternoon  LP was canceled due to improvement in mentation  Mom says this is the best day he has had in a while    Current Facility-Administered Medications   Medication Dose Route Frequency Provider Last Rate Last Dose    0.9%  NaCl infusion (for blood administration)   Intravenous Q24H PRN Jin Perez NP        0.9%  NaCl infusion (for blood administration)   Intravenous Q24H PRN Seda Camacho NP        0.9%  NaCl infusion   Intravenous PRN Win Gentile  mL/hr at 03/09/18 0800      0.9%  NaCl infusion   Intravenous PRN Krish Frausto MD        acetaminophen tablet 650 mg  650 mg Oral Q6H PRN Shereen Mcguire DNP   650 mg at 03/09/18 0034    albuterol-ipratropium 2.5mg-0.5mg/3mL nebulizer solution 3 mL  3 mL Nebulization Q4H PRN Sean Patel MD        bisacodyl EC tablet 10 mg  10 mg Oral Daily Fransisca Thomas PA-C   10 mg at 03/12/18 1212    bisacodyl suppository 10 mg  10 mg Rectal Daily PRN Margarita Swain MD   10 mg at 02/27/18 2219    ceFEPIme injection 1 g  1 g Intravenous Q12H Seda Camacho NP   1 g at 03/12/18 0538    dextrose 50% injection 12.5 g  12.5 g Intravenous PRN Citlali Bridges PA-C   12.5 g at 02/19/18 0835    dextrose 50% injection 25 g  25 g Intravenous PRN Citlali Bridges PA-C        diphenhydrAMINE-zinc acetate 1-0.1% cream   Topical (Top) TID PRN Shereen Mcguire DNP        docusate sodium capsule 100 mg  100 mg Oral BID Seda Camacho NP   100 mg at 03/08/18 2149    epoetin maursiio injection 10,000 Units  10,000 Units Intravenous Every Mon, Wed, Fri Derrick Turner NP   10,000 Units at 03/12/18 1126    ergocalciferol capsule 50,000 Units  50,000 Units Oral Q7 Days Mandy Crespo NP   50,000 Units at 03/08/18 1721    fat emulsion 20% infusion 250  mL  250 mL Intravenous Once Seda Camacho NP        glucagon (human recombinant) injection 1 mg  1 mg Intramuscular PRN Citlali Bridges PA-C        glucose chewable tablet 16 g  16 g Oral PRN Citlali Bridges PA-C        glucose chewable tablet 24 g  24 g Oral PRN Citlali Bridges PA-C        heparin (porcine) injection 1,000 Units  1,000 Units Intra-Catheter PRN Kay Guillen NP   1,000 Units at 03/12/18 1141    heparin (porcine) injection 5,000 Units  5,000 Units Subcutaneous Q8H Mandy Crespo NP   5,000 Units at 03/10/18 2217    hydrocortisone sodium succinate injection 50 mg  50 mg Intravenous Q8H Seda Camacho NP   50 mg at 03/12/18 1418    isavuconazonium sulfate 372 mg in sodium chloride 0.9% 250 mL   Intravenous Q24H Nathanael Medina  mL/hr at 02/28/18 0101      levetiracetam oral soln Soln 500 mg  500 mg Oral BID Seda Camacho NP   500 mg at 03/12/18 1212    levothyroxine tablet 75 mcg  75 mcg Oral Before breakfast Shereen Mcguire DNP   75 mcg at 03/12/18 0600    metronidazole IVPB 500 mg  500 mg Intravenous Q8H Mandy Crespo  mL/hr at 03/12/18 1213 500 mg at 03/12/18 1213    multivitamin tablet 1 tablet  1 tablet Oral Daily Citlali Bridges PA-C   1 tablet at 03/08/18 0825    naloxone 0.4 mg/mL injection 0.2 mg  0.2 mg Intravenous PRN Geronimo Vera PA-C        omeprazole capsule 40 mg  40 mg Oral QAM Seda Camacho NP   40 mg at 03/12/18 0700    ondansetron disintegrating tablet 8 mg  8 mg Oral Q6H PRN Shereen Mcguire DNP   8 mg at 01/19/18 2052    ondansetron injection 4 mg  4 mg Intravenous Q6H PRN Shereen Mcguire DNP   4 mg at 03/11/18 2056    oxyCODONE immediate release tablet 5 mg  5 mg Oral Q6H PRN Mandy Crespo NP   5 mg at 03/12/18 1532    polyethylene glycol packet 17 g  17 g Oral BID Seda Camacho NP   Stopped at 02/12/18 2100    prochlorperazine injection Soln 10 mg  10 mg Intravenous Q6H PRN Shereen Mcguire DNP   10 mg at 03/12/18  0538    psyllium husk (aspartame) 3.4 gram PwPk 1 packet  3.4 g Oral BID Esda T Voltz, NP   Stopped at 02/12/18 2100    simethicone chewable tablet 80 mg  1 tablet Oral TID PRN Geronimo Vera PA-C        sirolimus tablet 1 mg  1 mg Oral Daily Seda T Voltz, NP   1 mg at 03/12/18 1211    sodium chloride 0.9% flush 3 mL  3 mL Intravenous PRN Seb Rosario MD        sodium chloride 0.9% flush 3 mL  3 mL Intravenous PRN Singh Mcpherson MD        TPN ADULT CENTRAL LINE CUSTOM   Intravenous Continuous Seda T Voltz, NP 48 mL/hr at 03/11/18 2211      TPN ADULT CENTRAL LINE CUSTOM   Intravenous Continuous Seda T Voltz, NP        ursodiol oral suspension (conc: 60 mg/mL) 300 mg  300 mg Oral BID Geronimo Vera PA-C   300 mg at 03/12/18 1213     Review of Systems   Constitutional: Positive for fatigue.   Gastrointestinal: Positive for abdominal pain.   Neurological: Negative for tremors, light-headedness, numbness and headaches.   Psychiatric/Behavioral: Positive for decreased concentration.     Objective:     Vital Signs (Most Recent):  Temp: 98.8 °F (37.1 °C) (03/12/18 1512)  Pulse: 80 (03/12/18 1247)  Resp: 18 (03/12/18 1512)  BP: 125/69 (03/12/18 1512)  SpO2: 95 % (03/12/18 1512) Vital Signs (24h Range):  Temp:  [97.8 °F (36.6 °C)-98.8 °F (37.1 °C)] 98.8 °F (37.1 °C)  Pulse:  [76-93] 80  Resp:  [15-18] 18  SpO2:  [94 %-95 %] 95 %  BP: (116-151)/(55-90) 125/69     Weight: 74.2 kg (163 lb 9.3 oz)  Body mass index is 26.4 kg/m².    Physical Exam    NEUROLOGICAL EXAMINATION:     MENTAL STATUS   Oriented to person.   Oriented to place.   Oriented to year.   Follows 1 step commands.   Attention: decreased. Concentration: decreased.   Level of consciousness: drowsy ,  arousable by verbal stimuli    CRANIAL NERVES     CN III, IV, VI   Right pupil: Shape: regular.   Left pupil: Shape: regular.   Nystagmus: none   Ophthalmoparesis: none    CN VIII   Hearing: intact    MOTOR EXAM   Muscle bulk:  normal  Overall muscle tone: normal       Generalized 4+/5 weakness throughout     SENSORY EXAM   Light touch normal.     GAIT AND COORDINATION     Tremor   Resting tremor: absent    Significant Labs:   Hemoglobin A1c: No results for input(s): HGBA1C in the last 720 hours.  Blood Culture: No results for input(s): LABBLOO in the last 48 hours.  CBC:   Recent Labs  Lab 03/11/18  0530 03/12/18  0530   WBC 8.48 7.20   HGB 7.7* 7.1*   HCT 23.5* 21.8*    154     CMP:   Recent Labs  Lab 03/11/18  0530 03/12/18  0530    136*    138   K 3.9 4.6    105   CO2 21* 19*   BUN 60* 83*   CREATININE 3.9* 4.5*   CALCIUM 9.6 9.6   MG 2.1 2.3   PROT 5.7* 5.5*   ALBUMIN 1.8* 1.9*   BILITOT 1.8* 1.5*   ALKPHOS 128 111   AST 42* 29   ALT 7* 6*   ANIONGAP 11 14   EGFRNONAA 19.7* 16.5*     Inflammatory Markers: No results for input(s): SEDRATE, CRP, PROCAL in the last 48 hours.  Respiratory Culture: No results for input(s): GSRESP, RESPIRATORYC in the last 48 hours.  Urine Culture: No results for input(s): LABURIN in the last 48 hours.  Urine Studies: No results for input(s): COLORU, APPEARANCEUA, PHUR, SPECGRAV, PROTEINUA, GLUCUA, KETONESU, BILIRUBINUA, OCCULTUA, NITRITE, UROBILINOGEN, LEUKOCYTESUR, RBCUA, WBCUA, BACTERIA, SQUAMEPITHEL, HYALINECASTS in the last 48 hours.    Invalid input(s): WRIGHTSUR  All pertinent lab results from the past 24 hours have been reviewed.    Routine EEG (3/9/18) 27 min 48 sec  mild, generalized, non-specific cerebral dysfunction.  No seizures or epileptiform discharges were recorded.    Significant Imaging: I have reviewed and interpreted all pertinent imaging results/findings within the past 24 hours.

## 2018-03-12 NOTE — PLAN OF CARE
Problem: Physical Therapy Goal  Goal: Physical Therapy Goal  Goals to be met by: 3/19/18     Patient will increase functional independence with mobility by performin. Supine to sit with Modified [place].  2. Sit to supine with Modified [place].  3. Sit to stand transfer with Minimal Assistance. - GOAL MET  4. Bed to chair transfer with Contact Guard Assistance using Rolling Walker. - GOAL MET  5. Gait  x 150 feet with Contact Guard Assistance using Rolling Walker. - GOAL MET  6. Ascend/descend 1 stair with bilateral Handrails Minimal Assistance.   7. Lower extremity exercise program x 20 reps per handout, with assistance as needed.     Outcome: Ongoing (interventions implemented as appropriate)  Pt achieved 3 goals.

## 2018-03-12 NOTE — PROGRESS NOTES
[facility]  Neurology  Progress Note    Patient Name: Jhonny Diana  MRN: [id number]  Admission Date: 1/11/2018  Hospital Length of Stay: 60 days  Code Status: Full Code   Attending Provider: Nathanael Medina MD  Primary Care Physician: Primary Doctor No   Principal Problem:Fever      Subjective:     Interval History:   Mental status significantly improved today. Able to work with PT/OT and walk down the jenkins  Had dialysis this morning and received 1 unit PRBC this afternoon  LP was canceled due to improvement in mentation  Mom says this is the best day he has had in a while    Current Facility-Administered Medications   Medication Dose Route Frequency Provider Last Rate Last Dose    0.9%  NaCl infusion (for blood administration)   Intravenous Q24H PRN Jin Perez NP        0.9%  NaCl infusion (for blood administration)   Intravenous Q24H PRN Seda Camacho NP        0.9%  NaCl infusion   Intravenous PRN Win Gentile  mL/hr at 03/09/18 0800      0.9%  NaCl infusion   Intravenous PRN Krish Frausto MD        acetaminophen tablet 650 mg  650 mg Oral Q6H PRN Shereen Mcgurie DNP   650 mg at 03/09/18 0034    albuterol-ipratropium 2.5mg-0.5mg/3mL nebulizer solution 3 mL  3 mL Nebulization Q4H PRN Sean Patel MD        bisacodyl EC tablet 10 mg  10 mg Oral Daily Fransisca Thomas PA-C   10 mg at 03/12/18 1212    bisacodyl suppository 10 mg  10 mg Rectal Daily PRN Margarita Swain MD   10 mg at 02/27/18 2219    ceFEPIme injection 1 g  1 g Intravenous Q12H Seda Camacho, NP   1 g at 03/12/18 0538    dextrose 50% injection 12.5 g  12.5 g Intravenous PRN Citlali Bridges PA-C   12.5 g at 02/19/18 0835    dextrose 50% injection 25 g  25 g Intravenous PRN Citlali Bridges PA-C        diphenhydrAMINE-zinc acetate 1-0.1% cream   Topical (Top) TID PRN Shereen Mcguire DNP        docusate sodium capsule 100 mg  100 mg Oral BID Seda Camacho, NP   100 mg at 03/08/18  2149    epoetin maurisio injection 10,000 Units  10,000 Units Intravenous Every Mon, Wed, Fri Derrick Turner, NP   10,000 Units at 03/12/18 1126    ergocalciferol capsule 50,000 Units  50,000 Units Oral Q7 Days Mandy Crespo NP   50,000 Units at 03/08/18 1721    fat emulsion 20% infusion 250 mL  250 mL Intravenous Once eSda Camacho NP        glucagon (human recombinant) injection 1 mg  1 mg Intramuscular PRN Citlali Bridges PA-C        glucose chewable tablet 16 g  16 g Oral PRN Citlali Bridges PA-C        glucose chewable tablet 24 g  24 g Oral PRN Citlali Bridges PA-C        heparin (porcine) injection 1,000 Units  1,000 Units Intra-Catheter PRN Kay Guillen NP   1,000 Units at 03/12/18 1141    heparin (porcine) injection 5,000 Units  5,000 Units Subcutaneous Q8H Mandy Crespo NP   5,000 Units at 03/10/18 2217    hydrocortisone sodium succinate injection 50 mg  50 mg Intravenous Q8H Seda Camacho NP   50 mg at 03/12/18 1418    isavuconazonium sulfate 372 mg in sodium chloride 0.9% 250 mL   Intravenous Q24H Nathanael Medina  mL/hr at 02/28/18 0101      levetiracetam oral soln Soln 500 mg  500 mg Oral BID Seda SIENNA Camacho NP   500 mg at 03/12/18 1212    levothyroxine tablet 75 mcg  75 mcg Oral Before breakfast Shereen Mcguire DNP   75 mcg at 03/12/18 0600    metronidazole IVPB 500 mg  500 mg Intravenous Q8H Mandy Crespo  mL/hr at 03/12/18 1213 500 mg at 03/12/18 1213    multivitamin tablet 1 tablet  1 tablet Oral Daily Citlali Bridges PA-C   1 tablet at 03/08/18 0825    naloxone 0.4 mg/mL injection 0.2 mg  0.2 mg Intravenous PRN Geronimo Vera PA-C        omeprazole capsule 40 mg  40 mg Oral QAM Seda Caamcho NP   40 mg at 03/12/18 0700    ondansetron disintegrating tablet 8 mg  8 mg Oral Q6H PRN Shereen Mcguire DNP   8 mg at 01/19/18 2052    ondansetron injection 4 mg  4 mg Intravenous Q6H PRN Shereen Mcguire DNP   4 mg at 03/11/18 2056    oxyCODONE  immediate release tablet 5 mg  5 mg Oral Q6H PRN Mandy Crespo NP   5 mg at 03/12/18 1532    polyethylene glycol packet 17 g  17 g Oral BID Seda T Doug, NP   Stopped at 02/12/18 2100    prochlorperazine injection Soln 10 mg  10 mg Intravenous Q6H PRN Shereen Mcguire DNP   10 mg at 03/12/18 0538    psyllium husk (aspartame) 3.4 gram PwPk 1 packet  3.4 g Oral BID Seda T Ramírezz, NP   Stopped at 02/12/18 2100    simethicone chewable tablet 80 mg  1 tablet Oral TID PRN Geronimo Vera PA-C        sirolimus tablet 1 mg  1 mg Oral Daily Seda T Doug, NP   1 mg at 03/12/18 1211    sodium chloride 0.9% flush 3 mL  3 mL Intravenous PRN Seb Rosario MD        sodium chloride 0.9% flush 3 mL  3 mL Intravenous PRN Singh Mcpherson MD        TPN ADULT CENTRAL LINE CUSTOM   Intravenous Continuous Seda T Voltz, NP 48 mL/hr at 03/11/18 2211      TPN ADULT CENTRAL LINE CUSTOM   Intravenous Continuous Seda T Voltz, NP        ursodiol oral suspension (conc: 60 mg/mL) 300 mg  300 mg Oral BID Geronimo Vera PA-C   300 mg at 03/12/18 1213     Review of Systems   Constitutional: Positive for fatigue.   Gastrointestinal: Positive for abdominal pain.   Neurological: Negative for tremors, light-headedness, numbness and headaches.   Psychiatric/Behavioral: Positive for decreased concentration.     Objective:     Vital Signs (Most Recent):  Temp: 98.8 °F (37.1 °C) (03/12/18 1512)  Pulse: 80 (03/12/18 1247)  Resp: 18 (03/12/18 1512)  BP: 125/69 (03/12/18 1512)  SpO2: 95 % (03/12/18 1512) Vital Signs (24h Range):  Temp:  [97.8 °F (36.6 °C)-98.8 °F (37.1 °C)] 98.8 °F (37.1 °C)  Pulse:  [76-93] 80  Resp:  [15-18] 18  SpO2:  [94 %-95 %] 95 %  BP: (116-151)/(55-90) 125/69     Weight: 74.2 kg (163 lb 9.3 oz)  Body mass index is 26.4 kg/m².    Physical Exam    NEUROLOGICAL EXAMINATION:     MENTAL STATUS   Oriented to person.   Oriented to place.   Oriented to year.   Follows 1 step commands.   Attention: decreased.  Concentration: decreased.   Level of consciousness: drowsy ,  arousable by verbal stimuli    CRANIAL NERVES     CN III, IV, VI   Right pupil: Shape: regular.   Left pupil: Shape: regular.   Nystagmus: none   Ophthalmoparesis: none    CN VIII   Hearing: intact    MOTOR EXAM   Muscle bulk: normal  Overall muscle tone: normal       Generalized 4+/5 weakness throughout     SENSORY EXAM   Light touch normal.     GAIT AND COORDINATION     Tremor   Resting tremor: absent    Significant Labs:   Hemoglobin A1c: No results for input(s): HGBA1C in the last 720 hours.  Blood Culture: No results for input(s): LABBLOO in the last 48 hours.  CBC:   Recent Labs  Lab 03/11/18  0530 03/12/18  0530   WBC 8.48 7.20   HGB 7.7* 7.1*   HCT 23.5* 21.8*    154     CMP:   Recent Labs  Lab 03/11/18  0530 03/12/18  0530    136*    138   K 3.9 4.6    105   CO2 21* 19*   BUN 60* 83*   CREATININE 3.9* 4.5*   CALCIUM 9.6 9.6   MG 2.1 2.3   PROT 5.7* 5.5*   ALBUMIN 1.8* 1.9*   BILITOT 1.8* 1.5*   ALKPHOS 128 111   AST 42* 29   ALT 7* 6*   ANIONGAP 11 14   EGFRNONAA 19.7* 16.5*     Inflammatory Markers: No results for input(s): SEDRATE, CRP, PROCAL in the last 48 hours.  Respiratory Culture: No results for input(s): GSRESP, RESPIRATORYC in the last 48 hours.  Urine Culture: No results for input(s): LABURIN in the last 48 hours.  Urine Studies: No results for input(s): COLORU, APPEARANCEUA, PHUR, SPECGRAV, PROTEINUA, GLUCUA, KETONESU, BILIRUBINUA, OCCULTUA, NITRITE, UROBILINOGEN, LEUKOCYTESUR, RBCUA, WBCUA, BACTERIA, SQUAMEPITHEL, HYALINECASTS in the last 48 hours.    Invalid input(s): WRIGHTSUR  All pertinent lab results from the past 24 hours have been reviewed.    Routine EEG (3/9/18) 27 min 48 sec  mild, generalized, non-specific cerebral dysfunction.  No seizures or epileptiform discharges were recorded.    Significant Imaging: I have reviewed and interpreted all pertinent imaging results/findings within the past 24  hours.    Assessment and Plan:     Encephalopathy    Slurred and mumbled speech reported since 3/1 with increased lethargy, confusion, arm movements while sleeping and gait difficulty.   Complicated hospital course with ATN requiring dialysis, superficial wound infection with wound vac, multiple admissions for fevers, peritonitis, persistent ascites, biliary strictures requiring ERCP and biliary stenting, pleural effusion s/p thoracentesis and malnutrition on TPN.    On exam, patient has decreased attention and concentration. Low and mumbled speech, but able to repeat phrases without difficulty or apparent receptive aphasia. Suspect mental status change on 3/1 is due to recent infection and possible s/e from prograf complicated by ICU delirium after long admission. CT head and MRI brain without contrast were unremarkable for acute intracranial pathology. LP was scheduled with IR for 3/12/18 to r/o CNS infection in immunocompromised patient, but canceled due to significant improvement in mentation today.    ->continue delirium precautions with good sleep hygiene and regulation of sleep/wake cycle. Limit interruptions in the middle of the night.   ->encouraged to limit sedating medications and narcotics as able      Seizure    Hx of seizure 11/2/17 described as generalized convulsions while on prograf. Started on Keppra 500mg BID. AMS with speech change, confusion and increased lethargy since 3/1 after prograf was restarted 2/24/18.     3/9/18-routine EEG negative for epileptiform discharges or focal irritability   3/12/18-mental status improved significantly. Able to walk down the jenkins with PT/OT  LP canceled    -continue Keppra 500 mg BID       At risk for opportunistic infections    Immunosuppressed s/p liver transplant 10/2017   -ID following     VTE Risk Mitigation         Ordered     heparin (porcine) injection 1,000 Units  As needed (PRN)     Route:  Intra-Catheter        03/12/18 0020     heparin (porcine)  injection 5,000 Units  Every 8 hours     Route:  Subcutaneous        03/08/18 1622     Medium Risk of VTE  Once      01/11/18 0351     Place sequential compression device  Until discontinued      01/11/18 0351        Neurology to sign off. Please call with any questions or clarifications    Irena Flowers PA-C  General Neurology Consult  Neuro Consult [facility] # [id number]

## 2018-03-12 NOTE — PROGRESS NOTES
" [facility]  Adult Nutrition  Progress Note    SUMMARY       Recommendations    Recommendation/Intervention:   1. If to continue TPN, recommend increasing current TPN to 92g AA / 362g dex plus IV lipids daily will meet EEN and EPN.  2. Encourage PO intake. Tolerance seems slightly improved.  3. RD following.     Goals: 1. Pt will consume/ tolerate >75% of EEN and EPN  Nutrition Goal Status: progressing towards goal  Communication of RD Recs: reviewed with physician    Reason for Assessment    Reason for Assessment: RD follow-up  Diagnosis: transplant/postoperative complications  Relevant Medical History: OLTx 10/19/2017, complicate post-op w/ LAURA ongoing, chronic malnutrition, cirrhosis 2' EtOH  Interdisciplinary Rounds: did not attend    General Information Comments: Pt reports slightly improved N/abdominal pain. Able to eat a biscuit, eggs, and sausage for breakfast this am. Pt s/p LP. With possible partial bowel obstruction. Last HD this am.    Nutrition Discharge Planning: Unable to determine at this time    Nutrition Risk Screen    Nutrition Risk Screen: large or nonhealing wound, burn or pressure ulcer    Nutrition/Diet History    Patient Reported Diet/Restrictions/Preferences: low salt  Typical Food/Fluid Intake: eats very little at this time  Food Preferences: No cultural or Latter day food preferences noted  Do you have any cultural, spiritual, Latter day conflicts, given your current situation?: no  (Retired) Meal/Snack Patterns: pt likes to cook, when feeling well will cook and then only eat bites  Supplemental Drinks or Food Habits:  (protein powders sometimes)  (RETIRED) Functional Status: able to prepare meals, able to purchase food, ambulatory  Factors Affecting Nutritional Intake: altered gastrointestinal function, abdominal distention, decreased appetite, nausea/vomiting    Anthropometrics    Temp: 98.1 °F (36.7 °C)  Height Method: Stated  Height: 5' 6" (167.6 cm)  Height (inches): " "66 in  Weight Method: Bed Scale  Weight: 74.2 kg (163 lb 9.3 oz)  Weight (lb): 163.58 lb  Ideal Body Weight (IBW), Male: 142 lb  % Ideal Body Weight, Male (lb): 112.68 lb  BMI (Calculated): 25.9  BMI Grade: 25 - 29.9 - overweight  Weight Loss: unintentional  Usual Body Weight (UBW), k kg (2017 )  % Usual Body Weight: 86.71  % Weight Change From Usual Weight: -13.47 %    Lab/Procedures/Meds    Pertinent Labs Reviewed: reviewed  Pertinent Labs Comments: BUN 83, Crt 4.5, GFR 16.5, Glu 136, Alb 1.9, PAB 10, T.Bili 1.5,   Pertinent Medications Reviewed: reviewed  Pertinent Medications Comments: bisacodyl, docusate, epoetin, vit D, hydrocortisone, levothyroxine, MVI, omeprazole, polyethylene glycol, psyllium    Physical Findings/Assessment    Overall Physical Appearance: generalized wasting, loss of muscle mass, loss of subcutaneous fat, weak  Oral/Mouth Cavity: WDL  Skin: intact    Estimated/Assessed Needs    Weight Used For Calorie Calculations: 62.3 kg (137 lb 5.6 oz)  Height: 5' 6" (167.6 cm)  Energy Calorie Requirements (kcal): 2644-4517 (30-35 kcal/kg)  Energy Need Method: Kcal/kg  RMR (Topaz-St. Jeor Equation): 1535.75  Protein Requirements: 81-94g (1.3-1.5 g/kg)  Weight Used For Protein Calculations: 62.3 kg (137 lb 5.6 oz)  Fluid Requirements (mL): 1mL/kcal  Fluid Need Method: RDA Method  RDA Method (mL):        Nutrition Prescription Ordered    Current Diet Order: NPO  Current Nutrition Support Formula Ordered:  (Custom TPN 92g AA / 182g dex plus IV lipids)  Current Nutrition Support Rate Ordered: 48 (ml)  Current Nutrition Support Frequency Ordered: mL/hr  Oral Nutrition Supplement: Boost Breeze    Evaluation of Received Nutrient/Fluid Intake    Parenteral Calories (kcal): 987  Parenteral Protein (gm): 92  Parenteral Fluid (mL): 1152  Lipid Calories (kcals): 500 kcals  GIR (Glucose Infusion Rate) (mg/kg/min): 1.89 mg/kg/min  Total Calories (kcal/kg): 1487  % Kcal Needs: 79  % Protein " Needs: 100  Energy Calories Required: not meeting needs  Protein Required: meeting needs  Fluid Required: meeting needs  Comments: LBM 3/8  Tolerance: tolerating  % Intake of Estimated Energy Needs: 75 - 100 %  % Meal Intake: 25 - 50 %    Nutrition Risk    Level of Risk/Frequency of Follow-up:  (2x/week)     Assessment and Plan    Severe protein-calorie malnutrition    Nutrition Diagnosis  Inadequate oral intake    Related to (etiology):   Decreased appetite, abdominal distention    Signs and Symptoms (as evidenced by):   Pt eating 0% of meals and relying on enteral nutrition for 100% of EPN, EEN     Interventions/Recommendations (treatment strategy):  See recs    Nutrition Diagnosis Status:   Continues             Monitor and Evaluation    Food and Nutrient Intake: energy intake, food and beverage intake  Food and Nutrient Adminstration: diet order  Knowledge/Beliefs/Attitudes: food and nutrition knowledge/skill  Physical Activity and Function: nutrition-related ADLs and IADLs  Anthropometric Measurements: weight, weight change, body mass index  Biochemical Data, Medical Tests and Procedures: electrolyte and renal panel, lipid profile, gastrointestinal profile, glucose/endocrine profile, inflammatory profile  Nutrition-Focused Physical Findings: overall appearance     Nutrition Follow-Up    RD Follow-up?: Yes

## 2018-03-12 NOTE — PT/OT/SLP PROGRESS
Occupational Therapy   Treatment    Name: Jhonny Diana  MRN: [id number]  Admitting Diagnosis:  Fever  3 Days Post-Op    Recommendations:     Discharge Recommendations: nursing facility, skilled  Discharge Equipment Recommendations:  bath bench  Barriers to discharge:  None    Subjective     Communicated with: nsg prior to session.  Pain/Comfort:  · Pain Rating 1: 0/10  · Pain Addressed 1: Nurse notified, Distraction, Cessation of Activity    Patients cultural, spiritual, Rastafari conflicts given the current situation: none    Objective:     Patient found with: peripheral IV, central line, telemetry    General Precautions: Standard, fall   Orthopedic Precautions:    Braces:       Occupational Performance:    Bed Mobility:    · Up on couch on arrival     Functional Mobility/Transfers:  · Transfers CGA    · Functional Mobility: ambulation in room with RW with CGA  · Sit to stand x 3 reps with spvn    Activities of Daily Living:  · Socks and shoes with setup sitting    Patient left sitting up on couch with all lines intact, mom bedside    AMPAC 6 Click:  AMPAC Total Score: 19    Treatment & Education:  Plan of care, importance of OOB activity, plan of care and progression of activity  Education:    Assessment:     Jhonny Diana is a 27 y.o. male with a medical diagnosis of Fever.  He presents with improved tolerance and mobility/ADL's.  Performance deficits affecting function are weakness, impaired endurance, impaired self care skills, impaired balance, gait instability, impaired functional mobilty, decreased safety awareness, decreased lower extremity function, decreased upper extremity function, impaired cardiopulmonary response to activity.      Rehab Prognosis:  good; patient would benefit from acute skilled OT services to address these deficits and reach maximum level of function.       Plan:     Patient to be seen 4 x/week to address the above listed problems via self-care/home management, community/work re-entry,  therapeutic activities, therapeutic exercises  · Plan of Care Expires: 18  · Plan of Care Reviewed with: patient, mother    This Plan of care has been discussed with the patient who was involved in its development and understands and is in agreement with the identified goals and treatment plan    GOALS:    Occupational Therapy Goals        Problem: Occupational Therapy Goal    Goal Priority Disciplines Outcome Interventions   Occupational Therapy Goal     OT, PT/OT Ongoing (interventions implemented as appropriate)    Description:  Goals to be met by:  2 Weeks (3/24/2018)    Patient will increase functional independence with ADLs by performin. Supine to sit with Supervision.  2. Sit to Stand transfers with Supervision.   3. Toilet transfer to toilet with Supervision.  4. Grooming while standing with Supervision.  5. UE Dressing with Supervision.- met 3/12/18  6. LE Dressing with Minimal Assistance-met 3/12/18, revised: LE dress with spvn                          Time Tracking:     OT Date of Treatment: 18  OT Start Time: 1408  OT Stop Time: 1420  OT Total Time (min): 12 min    Billable Minutes:Self Care/Home Management 12 min    Zulma Renteria OT  3/12/2018

## 2018-03-12 NOTE — PT/OT/SLP PROGRESS
"Physical Therapy Treatment    Patient Name:  Jhonny Diana   MRN:  [id number]    Recommendations:     Discharge Recommendations:  home health PT   Discharge Equipment Recommendations: bath bench   Barriers to discharge: None    Assessment:     Jhonny Diana is a 27 y.o. male admitted with a medical diagnosis of Fever.  He presents with the following impairments/functional limitations:  weakness, impaired endurance, impaired functional mobilty, gait instability, impaired balance, decreased safety awareness, decreased coordination, impaired cardiopulmonary response to activity, edema.  Pt has demonstrated vast improvements in cognition and awareness of surroundings.  Currently able to follow 100% multi step directions, though does present as automatic with certain activities.  Concerns regarding pts balance and quality of gait, with 1 episode of LOB during gait training.  Requires S when amb with an AD, Migdalia to amb without AD.  New recommendation for pt to receive skilled PT services in the home setting upon discharge.  Pt will benefit from acute skilled PT services to address these deficits and reach maximum level of function.      Recent Surgery: Procedure(s) (LRB):  ERCP (N/A)  ULTRASOUND-ENDOSCOPIC-UPPER (N/A) 3 Days Post-Op    Plan:     During this hospitalization, patient to be seen 4 x/week to address the above listed problems via gait training, therapeutic activities, therapeutic exercises, neuromuscular re-education  · Plan of Care Expires:  04/05/18   Plan of Care Reviewed with: patient, mother    Subjective     Communicated with nursing prior to session.  Patient found sitting on couch upon PT entry to room, agreeable to treatment.      "Ti."  "Oh bull crap, [do] you remember how you were?" - mother    Chief Complaint: multiple procedures  Patient comments/goals: To improve appetite  Pain/Comfort:  · Pain Rating 1: 0/10    Patients cultural, spiritual, Islam conflicts given the current situation: " no    Objective:     Patient found with: peripheral IV, central line, telemetry     General Precautions: Standard, fall   Orthopedic Precautions:N/A   Braces: N/A     Functional Mobility:  · Transfers:     · Sit to Stand:  independence from couch and supervision from w/c with use of armrests  · Bed to Chair: contact guard assistance with  rolling walker  using  Stand Pivot    · Gait: Pt amb >700' total; S to amb with w/c handles for support; 200' amb with CGA-Migdalia with IV pole perf: fast/slow, stop/do, horizontal/vertical head turns upon verbal cuing; 1x episode of LOB amb 10' without AD with Migdalia to recover; verbal cuing throughout to facilitate ant step placement vs lateral step, to increase step length, and to improve foot clearance sec to shuffle    · Balance: S-CGA: dynamic standing balance with AD; Migdalia-dynamic standing balance without AD      AM-PAC 6 CLICK MOBILITY  Turning over in bed (including adjusting bedclothes, sheets and blankets)?: 3  Sitting down on and standing up from a chair with arms (e.g., wheelchair, bedside commode, etc.): 3  Moving from lying on back to sitting on the side of the bed?: 3  Moving to and from a bed to a chair (including a wheelchair)?: 3  Need to walk in hospital room?: 3  Climbing 3-5 steps with a railing?: 2  Total Score: 17       Therapeutic Activities and Exercises:   Whiteboard reviewed  Ankle pumps: 20 reps, in sit   LAQs: 20 reps each LE perf individually, in sit, lacks full kn ext   Hip abd/add: 20 reps each LE perf individually, in sit  Hip flex: 20 reps each LE perf individually, in sit  Sit-ups: 20 reps, in reclined sitting posture, with Migdalia  10x sit<>stand: 2 min 48.11 sec, 9/10 RPE   6MWT: Pt amb 468' = 142.65m  Gait speed: 0.40 m/s      Patient left sitting on couch with all lines intact, call button in reach and mother present.    GOALS:    Physical Therapy Goals        Problem: Physical Therapy Goal    Goal Priority Disciplines Outcome Goal Variances  Interventions   Physical Therapy Goal     PT/OT, PT Ongoing (interventions implemented as appropriate)     Description:  Goals to be met by: 3/19/18     Patient will increase functional independence with mobility by performin. Supine to sit with Modified [place].  2. Sit to supine with Modified [place].  3. Sit to stand transfer with Minimal Assistance. - GOAL MET  4. Bed to chair transfer with Contact Guard Assistance using Rolling Walker. - GOAL MET  5. Gait  x 150 feet with Contact Guard Assistance using Rolling Walker. - GOAL MET  6. Ascend/descend 1 stair with bilateral Handrails Minimal Assistance.   7. Lower extremity exercise program x 20 reps per handout, with assistance as needed.                       Time Tracking:     PT Received On: 18  PT Start Time: 1247     PT Stop Time: 1328  PT Total Time (min): 41 min     Billable Minutes: Gait Training 29 and Therapeutic Exercise 12    Treatment Type: Treatment  PT/PTA: PT           Tory Amato, PT  2018

## 2018-03-12 NOTE — PLAN OF CARE
Problem: Patient Care Overview  Goal: Plan of Care Review  Outcome: Ongoing (interventions implemented as appropriate)  Pt is AAOx3.Pt is ambulatory and independent.Family at bedside. NAD noted.Pt denies pian and/or discomfort at this time.Standard precautions maintained. Pt tolerating solid food. Denies N/V.  Pt up out of bed ambulated in the jenkins. Pt turns independently, pt is aware of bony area and pressure reduction positions Pt remains injury and fall free, non skid footwear donned, call light within reach, personal items within reach, bed in low/locked position, pt able to voice needs all needs voiced have been met at this time.

## 2018-03-12 NOTE — PLAN OF CARE
Problem: Occupational Therapy Goal  Goal: Occupational Therapy Goal  Goals to be met by:  2 Weeks (3/24/2018)    Patient will increase functional independence with ADLs by performin. Supine to sit with Supervision.  2. Sit to Stand transfers with Supervision.   3. Toilet transfer to toilet with Supervision.  4. Grooming while standing with Supervision.  5. UE Dressing with Supervision.- met 3/12/18  6. LE Dressing with Minimal Assistance-met 3/12/18, revised: LE dress with spvn        Outcome: Ongoing (interventions implemented as appropriate)  Pt progressing toward goals.  Zulma Renteria, OTR

## 2018-03-12 NOTE — PROGRESS NOTES
HD treatment started. No complications with access to right chest wall catheter. Lines secured and telemetry in place. Complains of SOB, O2 applied per NC. Pulse ox 98 %.

## 2018-03-12 NOTE — ASSESSMENT & PLAN NOTE
- Started on vanc/cefepime for ERCP. Now transitioned to vanc/zosyn.  - Blood cultures sent, prelim NGTD. Repeat blood cultures and chest xray today 2/10.  - thoracentesis 2/1 - fluid negative for infection.  - atelectasis noted on ct scan - start breathing tx & CPT.  - now cmv positive - started treatment 2/5/18.  - taken to OR 2/7 for exp lap - fluid collections drained, cultures pending.  - Patient with low grade fever 2/15, 100.3.  - CT A/P 2/15 with no evidence of IA fluid infection. Did show R pleural effusion s/p thoracentesis, cell count negative for infection, cx pending.  - Repeat blood cx 2/15 prelim NGTD, CMV PCR 2/15 negative   Complaining of abdominal pain. Is tender in RUQ.  - Remains with fevers.  - Bilirubin remains elevated. Concerning for cholangitis. Antibiotics broadened 2/17. ID re consulted.   - ERCP  2/19, overall unremarkable findings. Did place new larger stents. Will need repeat ERCP in 10 weeks. Bilirubin is stable post ERCP.  -Tmax 101 2/21 PM again ID following. Plan to transition vanc/zosyn to ertapenem 2/22 as no improvement noted.   - Erta started 2/22.  - CT A/P obtained 2/26 with focal/loculated region of fluid in the lower right abdomen measuring approximately 8.7 x 5.5 cm.   - IR attempted drainage on fluid collection on 2/27, however, per IR,there was limited fluid interposed with bowel in the RLQ with no safe pocket seen for drainage.  -Tagged WBC scan 2/27, negative results.  - Remains with low grade fevers.   - Per transplant attending, will hold off on EGD at this time as concern anesthesia will put patient at further increase risk for seizure activity. Will plan for potenial EGD on Monday. Discussed with GI, appreciate their assistance.  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 EBV PCR negative. Adenovirus pending. Repeat CMV PCR negative.    - Back pain and abdominal pain persists. Last BM 3/5 with brown bomb.   - ID following. Cont Ertapenem dc'd 3/7 - due to  AMS   - tmax 101.1 overnight 3/9 - Cefepime/vanc/flagyl currently.  Monitor.   - has been afebrile for last 48 hours

## 2018-03-12 NOTE — PT/OT/SLP PROGRESS
Occupational Therapy   Treatment    Name: Jhonny Diana  MRN: [id number]  Admitting Diagnosis:  Fever  3 Days Post-Op    Recommendations:     Discharge Recommendations: nursing facility, skilled  Discharge Equipment Recommendations:  bath bench  Barriers to discharge:  Decreased caregiver support    Subjective     Communicated with: nsg notified prior to session.cc with PT  Pain/Comfort:  · Pain Rating 1: 5/10 (at biopsy site)  · Pain Addressed 1: Nurse notified, Distraction, Cessation of Activity    Patients cultural, spiritual, Holiness conflicts given the current situation: no    Objective:     Patient found with: central line, peripheral IV, telemetry, wound vac, oxygen    General Precautions: Standard, fall   Orthopedic Precautions:    Braces:       Occupational Performance:    Bed Mobility:    · Sup to sit with min assist       Functional Mobility/Transfers:  · Transfers with min assist, sit to stand min assist x6 reps  · Ambulation with CGA, chair following, x 16 ft, 20 ft., 16 ft, rest between each x 2-3 min      Activities of Daily Living:  · UE dress with setup/min assist  · toileting standing with total assist for hygiene, min assist t/f to bedside commode, clothing management total assist standing  · Feeding independent sitting in bedside chair after tx    Patient left up in chair with all lines intact and call button in reach    [facility] 6 Click:  [facility] Total Score: 16    Treatment & Education:  Performed 2 sets red theraband exercises x 16 reps for lat pulls, x 20 reps for triceps, alternating with LE exercises with PT for UE rest breaks; educated on plan of care, importance of OOB as much as tolerated, goals of care and plan of care for progression of activity.   Education:    Assessment:     Jhonny Diana is a 27 y.o. male with a medical diagnosis of Fever.  He presents with increased tolerance for activity today, good effort.  Performance deficits affecting function are weakness, impaired endurance,  impaired self care skills, impaired balance, gait instability, impaired functional mobilty, impaired cardiopulmonary response to activity, decreased lower extremity function, decreased upper extremity function.      Rehab Prognosis:  good; patient would benefit from acute skilled OT services to address these deficits and reach maximum level of function.       Plan:     Patient to be seen 4 x/week to address the above listed problems via self-care/home management, community/work re-entry, therapeutic activities, therapeutic exercises  · Plan of Care Expires: 18  · Plan of Care Reviewed with: patient    This Plan of care has been discussed with the patient who was involved in its development and understands and is in agreement with the identified goals and treatment plan    GOALS:    Occupational Therapy Goals        Problem: Occupational Therapy Goal    Goal Priority Disciplines Outcome Interventions   Occupational Therapy Goal     OT, PT/OT Ongoing (interventions implemented as appropriate)    Description:  Goals to be met by:  2 Weeks (3/24/2018)    Patient will increase functional independence with ADLs by performin. Supine to sit with Supervision.  2. Sit to Stand transfers with Supervision.   3. Toilet transfer to toilet with Supervision.  4. Grooming while standing with Supervision.  5. UE Dressing with Supervision.- met 3/12/18  6. LE Dressing with Minimal Assistance-met 3/12/18, revised: LE dress with spvn                          Time Tracking:     OT Date of Treatment: 18  OT Start Time: 1425  OT Stop Time: 1520  OT Total Time (min): 55 min    Billable Minutes:Self Care/Home Management 25 min   Therapeutic Exercise 30 min    Zulma Renteria OT  3/12/2018

## 2018-03-12 NOTE — PROGRESS NOTES
HD treatment complete. Duration of treatment 3.5 hours and 3 L removed. Treatment was tolerated well and no complications with access to right chest wall. Catheter flushed with NS and locked with heparin. Capped and taped.

## 2018-03-12 NOTE — PROGRESS NOTES
[facility]  Liver Transplant  Progress Note    Patient Name: Jhonny Diana  MRN: [id number]  Admission Date: 2018  Hospital Length of Stay: 60 days  Code Status: Full Code  Primary Care Provider: Primary Doctor No  Post-Operative Day: 144    ORGAN:   LIVER  Disease Etiology: Acute Alcoholic Hepatitis  Donor Type:    - Brain Death  CDC High Risk:   No  Donor CMV Status:   Donor CMV Status: Positive  Donor HBcAB:   Negative  Donor HCV Status:   Negative  Whole or Partial: Whole Liver  Biliary Anastomosis: End to End  Arterial Anatomy: Standard  Subjective:     History of Present Illness:  Jhonny Diana is a 26 y/o male with past medical history of alcoholic cirrhosis.  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 Found to have peritonitis in November (WBC 5000, 75% PNM) neg for bile leak. He was treated initially with vanc/cefepime. Repeat cell counts  with some improvement (WBC 1400, 45% PNM). He has undergone multiple paracenteses as well as abscess drainage of perihepatic fluid collections and treated with antimicrobial therapy but no positive cultures. Of note, biliary stricture also identified and ERCP performed on 2017 with sphincterotomy and biliary stent placed. Liver tests still have not normalized despite intervention, bilirubin and AP remain elevated. Other pertinent PMH current wound vac in place 2/2 wound infection, malnutrition requiring TPN for short course and ongoing hypoalbuminemia, and seizure activity while on prograf and has since been switched to cyclosporine without reoccurrence.  He presented to the ER for fever, abdominal pain, and N/V. He reports fever (103) for 1  day prior. Overnight, he developed N/V, reports small amount of green emesis with new left sided pain. He also endorses worsening SOB with exertion. He was scheduled as an outpatient for follow up paracentesis and IR drainage of fluid collection. CXR in ER shows large pleural effusion with subsegmental atelectasis. Infectious work up initiated in ER. His ANC is 900. Broad spectrum antibiotics initiated in ED. He denies chest pain, palpitations, diarrhea, constipation, or back pain. Denies any sick contacts.    Hospital Course:  Thoracentesis (1.2L off), Paracentesis (1.6L off), and IR drainage of fluid collection 1/11, all fluids negative for infection. ID consulted. Broad spectrum antibiotics d/c'd 1/15. Chronically malnourished with poor PO intake, prealbumin 7. Shane tube placed 1/15 for tube feedings.     ERCP 1/17 with sludge and a biliary stone which was removed and stent exchanged.  Remained with n/v; therefore, TF remained on hold and TPN continued.   Pt with fever s/p ERCP on 1/17 which continued until 1/19.  Vanc/cefepime restarted.  Blood cx 1/17 and 1/18 NGTD.  ID reconsulted.  Fungal markers sent.  CT C/A/P obtained.  With large R pleural effusion and ascites- both drained 1/19 and negative for infx per cell count. Pt afebrile 1/20. Liver biopsy 1/23 - without rejection.  EGD 1/25 - unremarkable for source of GI symptoms.  Resume diet along with other GI recommendations. SHANE tube placed for tube feeds.       2/22: Pt again spiked temp of 101 overnight, now down to 99. Resent Bcx overnight, prelim NGTD. Transitioned to Erta from vanc/zosyn per ID. Planning for tagged WBC scan on Monday. R sided abdominal erythema/dependent edema improving today.  TF have been on hold since 2/20. Will continue to hold today per pt request. Pt working on diet. KUB yesterday with significant stool and gas. Declines colace, miralax, metamucil, but willing to take daily bisacodyl. Pt agreeable to suppository today. If  suppository not successful can give mag citrate through dylan tonight. Will also retrial Marinol tonight as pt reported improvement in appetite previously. Will monitor closely for sedation.     2/23:  2 small BM 2/22.  Tolerated HD.  [place] removed.  Will attempt to eat this PM.      2/24:  Pt ate then vomited hamburger 2/23.  Will resume TPN.  D/C atovaquone, transition cyclo to Prograf, check Keppra level, increase Reglan to 0 mg, change twyla to BID for nausea.  Will give pentam today.    2/25:  TPN going well, pt ate 1/2 subway sandwhich without vomiting yesterday, will continue to monitor.  Plan to repeat EGD this week.  Pt with drop in H/h again and appears to be hemolyzing.  LDH elevated, hapto slightly low, LEE pending.  With neutropenia- Neupogen given.    2/26:  Pt with severe back pain s/p HD. Unclear if secondary to neupogen.  Repeat labs stable. Indium scan scheduled for 2/27- injection done today (back pain started prior). CT A/P obtained with fluid collection seen in pelvis.    2/27: Patient spiking fevers, 101. Indium scan, negative results. CT A/P obtained 2/26 with focal/loculated region of fluid in the lower right abdomen measuring approximately 8.7 x 5.5 cm. IR attempted drainage, however, per IR,there was  limited fluid interposed with bowel in the RLQ with no safe pocket seen for drainage.    2/28: Remains with low grade fevers. Neupogen given.     3/1: Patient with stuttering speech, trouble recalling specific words when speaking. Does have hx of seizure (on keppra) previously while on prograf. Switched to prograf 2/24 from cyclosporine. Neuro exam unremarkable. CT head without contrast obtained, unremarkable. In light of these symptoms, prograf d/c, cyclosporine restarted 3/1. Per transplant attending, held off on EGD at this time as concern anesthesia would put patient at further increase risk for seizure activity.     3/4: pt walked in room yesterday.  Had 1 BM.  No appetite still.  Cont TPN.   He reports swelling and back pain today.  He vomited after taking Oxy PO.  Fentanyl 12.5mg IV ordered.  Patient rested well afterward.  Remains afebrile.   ID following.   Cont Ertapenem.  Repeat blood and urine cx given continued malaise and stutter- blood cx prelim NGTD.  UA ok.  Urine cx pending.    Tentative plan for EGD on Monday to assess source of fevers/nausea/abdominal pain w bx for CMV and GVHD.  Will also reassess rather able to perform paracentesis.     3/5: no acute events overnight. Pt still not feeling well and with poor sleep overnight. Cont to have mumbling of speech at times but slightly better than before. Remains with abdominal pain but relieved with PO pain meds. Tolerated HD well today. Will plan for paracentesis and EGD tomorrow to assess for peritonitis and cause of persistent N/V respectively.     3/6: pt continues to have garbled/mumbled speech. EGD performed today and pt received sedation during procedure. Increased lethargy and dysarthria noted post procedure. VS stable. Paracentesis scheduled today as well to r/o peritonitis. Wbc count stable. Afebrile. Continue Ertapenem. HD yesterday and pt tolerated well, 2.5 L removed. Monitor.     3/7: MRI brain yesterday unremarkable. Blood cultures ngtd so far. Neurology consulted. EGD and Paracentesis yesterday. Para with 25 cc removed, wbc 28 segs 3%. Continue TPN. Monitor.    3/8: EUS and ERCP scheduled tomorrow. NPO after midnight. T bili continues trending up daily. Last ERCP on 2/19 with two stents placed. Ertapenem dc'd 3/7. Pt spiking temps today, tmax 100.9. Cefepime/vanc started. Oxycodone decreased to 5 mg q6h prn. Dc marinol and reglan for now. He continues to slur/stutter and have jerky movements throughout the day. Neurology following. EEG scheduled today. Monitor.     3/9: EUS/ERCP completed with choledoliathiasis.  Fever over night.  May need LP in future if mental status does not improve.      3/10- speech improved, neuro  suggesting lumbar puncture - schedule for Monday    3/11 - more lethargic, tmax 100.9, plan for lumbar puncture in am.    3/12 - mental status greatly improved, walking halls with PT, will hold off on LP for now.    Scheduled Meds:   bisacodyl  10 mg Oral Daily    ceFEPime (MAXIPIME) IVPB  1 g Intravenous Q12H    docusate sodium  100 mg Oral BID    epoetin maurisio (PROCRIT) injection  10,000 Units Intravenous Every Mon, Wed, Fri    ergocalciferol  50,000 Units Oral Q7 Days    fat emulsion 20%  250 mL Intravenous Once    heparin (porcine)  5,000 Units Subcutaneous Q8H    hydrocortisone sodium succinate  50 mg Intravenous Q8H    custom IVPB builder   Intravenous Q24H    levetiracetam oral soln  500 mg Oral BID    levothyroxine  75 mcg Oral Before breakfast    metronidazole  500 mg Intravenous Q8H    multivitamin  1 tablet Oral Daily    omeprazole  40 mg Oral QAM    polyethylene glycol  17 g Oral BID    psyllium husk (aspartame)  3.4 g Oral BID    sirolimus  1 mg Oral Daily    ursodiol  300 mg Oral BID     Continuous Infusions:   TPN ADULT CENTRAL LINE CUSTOM 48 mL/hr at 03/11/18 2211    TPN ADULT CENTRAL LINE CUSTOM       PRN Meds:sodium chloride, sodium chloride, sodium chloride 0.9%, sodium chloride 0.9%, acetaminophen, albuterol-ipratropium 2.5mg-0.5mg/3mL, bisacodyl, dextrose 50%, dextrose 50%, diphenhydrAMINE-zinc acetate 1-0.1%, glucagon (human recombinant), glucose, glucose, heparin (porcine), naloxone, ondansetron, ondansetron, oxyCODONE, prochlorperazine, simethicone, sodium chloride 0.9%, sodium chloride 0.9%    Review of Systems   Constitutional: Positive for activity change, appetite change and fatigue.   Gastrointestinal: Positive for abdominal distention and abdominal pain.   Genitourinary: Positive for decreased urine volume.   Allergic/Immunologic: Positive for immunocompromised state.     Objective:     Vital Signs (Most Recent):  Temp: 98.1 °F (36.7 °C) (03/12/18 1247)  Pulse: 80  (03/12/18 1247)  Resp: 18 (03/12/18 1247)  BP: 116/62 (03/12/18 1247)  SpO2: (!) 94 % (03/12/18 1247) Vital Signs (24h Range):  Temp:  [97.8 °F (36.6 °C)-98.7 °F (37.1 °C)] 98.1 °F (36.7 °C)  Pulse:  [76-93] 80  Resp:  [15-18] 18  SpO2:  [93 %-95 %] 94 %  BP: (116-151)/(55-90) 116/62     Weight: 74.2 kg (163 lb 9.3 oz)  Body mass index is 26.4 kg/m².    Intake/Output - Last 3 Shifts       03/10 0700 - 03/11 0659 03/11 0700 - 03/12 0659 03/12 0700 - 03/13 0659    P.O. 0 20     Other   600    IV Piggyback 300 400     .6 921.6     Total Intake(mL/kg) 1185.6 (16) 1341.6 (18.1) 600 (8.1)    Urine (mL/kg/hr) 0 (0) 0 (0)     Emesis/NG output 0 (0) 0 (0)     Other 0 (0) 0 (0) 3600 (6.9)    Stool 0 (0) 0 (0)     Blood 0 (0) 0 (0)     Total Output 0 0 3600    Net +1185.6 +1341.6 -3000           Urine Occurrence 0 x 0 x     Stool Occurrence 0 x 0 x     Emesis Occurrence 0 x 0 x           Physical Exam   Constitutional: He is oriented to person, place, and time. He appears well-developed.   Hand and temporal muscle wasting   HENT:   Head: Normocephalic.   Eyes: Pupils are equal, round, and reactive to light. Scleral icterus is present.   Cardiovascular: Normal rate, regular rhythm, normal heart sounds and intact distal pulses.    Pulmonary/Chest: Effort normal and breath sounds normal.   Abdominal: Bowel sounds are normal. He exhibits distension. There is no tenderness.   Musculoskeletal: Normal range of motion. He exhibits edema.   Neurological: He is alert and oriented to person, place, and time. GCS eye subscore is 4. GCS verbal subscore is 5. GCS motor subscore is 6.   Skin: Skin is warm and dry.   Psychiatric: He has a normal mood and affect. His behavior is normal. Thought content normal.   Nursing note and vitals reviewed.      Laboratory:  Immunosuppressants         Stop Route Frequency     sirolimus tablet 1 mg      -- Oral Daily     cycloSPORINE modified (NEORAL) 25 MG capsule      03/09 1714       cycloSPORINE  modified (NEORAL) 100 MG capsule      03/09 1714       cycloSPORINE modified (NEORAL) 100 MG capsule      03/09 0514       cycloSPORINE modified (NEORAL) 25 MG capsule      03/09 0514          CBC:     Recent Labs  Lab 03/12/18  0530   WBC 7.20   RBC 2.30*   HGB 7.1*   HCT 21.8*      MCV 95   MCH 30.9   MCHC 32.6     CMP:     Recent Labs  Lab 03/12/18  0530   *   CALCIUM 9.6   ALBUMIN 1.9*   PROT 5.5*      K 4.6   CO2 19*      BUN 83*   CREATININE 4.5*   ALKPHOS 111   ALT 6*   AST 29   BILITOT 1.5*     Coagulation:     Recent Labs  Lab 03/12/18  1004   INR 1.1     Labs within the past 24 hours have been reviewed.    Diagnostic Results:  None    Assessment/Plan:     Fever    - Started on vanc/cefepime for ERCP. Now transitioned to vanc/zosyn.  - Blood cultures sent, prelim NGTD. Repeat blood cultures and chest xray today 2/10.  - thoracentesis 2/1 - fluid negative for infection.  - atelectasis noted on ct scan - start breathing tx & CPT.  - now cmv positive - started treatment 2/5/18.  - taken to OR 2/7 for exp lap - fluid collections drained, cultures pending.  - Patient with low grade fever 2/15, 100.3.  - CT A/P 2/15 with no evidence of IA fluid infection. Did show R pleural effusion s/p thoracentesis, cell count negative for infection, cx pending.  - Repeat blood cx 2/15 prelim NGTD, CMV PCR 2/15 negative   Complaining of abdominal pain. Is tender in RUQ.  - Remains with fevers.  - Bilirubin remains elevated. Concerning for cholangitis. Antibiotics broadened 2/17. ID re consulted.   - ERCP  2/19, overall unremarkable findings. Did place new larger stents. Will need repeat ERCP in 10 weeks. Bilirubin is stable post ERCP.  -Tmax 101 2/21 PM again ID following. Plan to transition vanc/zosyn to ertapenem 2/22 as no improvement noted.   - Erta started 2/22.  - CT A/P obtained 2/26 with focal/loculated region of fluid in the lower right abdomen measuring approximately 8.7 x 5.5 cm.   - IR  attempted drainage on fluid collection on 2/27, however, per IR,there was limited fluid interposed with bowel in the RLQ with no safe pocket seen for drainage.  -Tagged WBC scan 2/27, negative results.  - Remains with low grade fevers.   - Per transplant attending, will hold off on EGD at this time as concern anesthesia will put patient at further increase risk for seizure activity. Will plan for potenial EGD on Monday. Discussed with GI, appreciate their assistance.  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 EBV PCR negative. Adenovirus pending. Repeat CMV PCR negative.    - Back pain and abdominal pain persists. Last BM 3/5 with brown bomb.   - ID following. Cont Ertapenem dc'd 3/7 - due to AMS   - tmax 101.1 overnight 3/9 - Cefepime/vanc/flagyl currently.  Monitor.   - has been afebrile for last 48 hours          Severe protein-calorie malnutrition    - Poor PO intake since transplant requiring short course of TPN during previous hospital stay.   - albumin remains decreased but appetite slowly improving per pt.   - Dietary consulted. Will need to closely monitor PO intake.   - supplements also ordered.   - SHANE with tube feeding start 1/16. Stopped 1/17/18, was not tolerating. [place] removed 1/20.  - prealbumin 7 on 1/15.  - TPN started 1/16/18.   - Pt able to eat minimally 1/21 and 1/22 which is an improvement.  - GI consulted - EGD reviewed, increased ppi bid, miralax bid, and metamucil bid, decreased narcotics.  - continue reglan, placed SHANE tube with tube feeds - transitioned to nightly feeds. Not tolerating well due to nausea.   - small bowel follow through with normal findings.   - consulted gi for possible gj tube placement, holding off on this for now as with ongoing fevers.  - Dietary changed tube feeds back to 24 hours from nightly 2/19 as not eating enough during the day to meet caloric needs. TF on hold since 2/20.  - retrial marinol 2/22 as pt reports that's the only thing helping his  appetite, in the evening 2/2 concern for possible drowsiness  - Pt then developed persistent N/V again and TPN started 2/24. Cont TPN for now.   - Pt able to eat 1/4 sandwich without vomiting 2/25.  Not been able to eat anything more than that in 48 hours per pt and mother.    - EGD 3/6 with normal esophagus    - congested, erythematous and nodular mucosa in the stomach.    - biliary stent in the duodenum    - one duodenal ulcer with a clean ulcer base (Emir Class III).    - biopsies taken in the duodenal bulb and second and third portion of the duodenum.  - hold reglan for neuro symptoms.  - continue TPN, tolerating diet today, monitor        Acute renal failure with tubular necrosis    - HD resumed on previous admission. Now anuric and dialyzes M-W-F.  - Nephrology following.    - Ktm consulted for possible kidney transplant.        Other cytomegaloviral diseases    - detected at 370 on CMV PCR 2/1.  - case discussed with ID and will hold off on treatment at this time given low WBC.   - undetected CMV PCR 2/8.  - continue Valcyte 200 mg every Mon, Wed, Fri.  - CMV PCR 2/15 negative.  - Per ID, valcyte dose decreased to ppx dose on 2/18.  - CMV PCR 2/22 negative.   - CMV PCR 3/8 pending.  - Repeat scope 3/6 to assess persistent N/V and possible CMV causing these symptoms. Biopsies thus far normal, cmv stains pending.        Liver transplanted    - Post op course complicated by fevers and hyperbilirubinemia.  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  - Liver US 1/8 showed 3.9 cm complex fluid collection anterior to right lobe and moderate nonspecific complex ascites inferior to transplant.   - IR placed drain 1/11/18, cell count negative for infection.  - PBS consulted. ERCP 1/17 with stone and sludge. Pt placed on Actigall - treated with 10 day course of abx.   - Bili elevated - liver u/s 1/28.  - Liver biopsy 1/23 without rejection.  - consulted PBS for ERCP - Choledocholithiasis was  found along with sludge, stents placed 1/30.   - ERCP 2/19, overall unremarkable findings. Did place new larger stents. Will need repeat ERCP in 10 weeks. Bilirubin improving.  - Liver US reviewed w mod ascites.  Stent in CBD.    - Paracentesis 3/6 negative for infection, wbc 28 segs 3%.  - ERCP, EUS biopsy today - pending.        Constipation    - Enema ordered 1/20 and 1/21 with BM.  - Encourage ambulation.  - Decrease narcotics.  - D/c metamucil and miralax bid as makes patient nauseated.  - Increase reglan qid.  - Small bowel follow through with normal findings.   - Schedule bisacodyl for bowel regimen.   - KUB 2/21 with significant amounts of stool and gas. Pt declines miralax, metamucil, colace.   - encouraged suppository.  - Improved on KUB 2/23.  - Pt moving bowels now but will have to keep aggressive bowel regimen on board.   - Brown bomb 3/5 with BM x 1.  - KUB 3/8 with findings concerning for a partial bowel obstruction.  - reports passing flatus, may need enema        Long-term use of immunosuppressant medication    - Maintenance IS with cyclosporine --> transitioned to Prograf 2/24 for continued nausea. However now with neurological symptoms with prograf and hx of seizure, prograf d/c and cyclosporine restarted 3/1.  - MMF on hold for infections and neutropenia.   - d/c cyclosporine started rapa & stress dose steroids, monitor.        Prophylactic immunotherapy    - See long term use of immunosuppression.         Anemia of chronic disease    - H&H stable. Cont to monitor with daily cbc.         Recurrent pleural effusion on right    - CXR in ER with large right pleural effusion with subjective c/o worsening SOB.  - thoracentesis completed- 1200 ml removed.  SOB with significant improvement.   - Cell count reviewed and negative for infection.   - resp even and non labored.  O2 sat 94-98%.  - Repeat thora 1/19 negative for infection per cell count.  - Reaccumulating fluid on xray 1/21.  - thoracentesis  1/31 - 1.4L removed, negative for infection.  - Reaccumulating fluid again on xray 2/10.   - Thoracentesis performed  2/16 with 1500 cc off, cell count neg for infection, cx no growth to date.   - CT A/P 2/26 with large R pleural effusion, may need repeat thora if fevers reoccur.          Biliary stricture of transplanted liver    - tbili with increase prompting ERCP 1/30 with stones and sludge, stents placed.  - Repeat ERCP 2/19 as was having recurrent fevers with bilirubin stuck at 2.0-2.2 with overall unremarkable findings seen. Did place new larger stents. Bilirubin was improving. Now trending up daily again. Needed repeat ERCP in 10 weeks.   - US 3/2 stent in CBD.  - ercp/eus 3/9 with mental stent placed in bile duct, stones removed        Seizure    - Patient with stuttering speech, trouble recalling specific words when speaking. Does have hx of seizure (on keppra) previously while on prograf. Switched to prograf over the weekend from cyclosporine. Neuro exam unremarkable.   - CT head without contrast obtained, unremarkable.   - In light of these symptoms, prograf d/c, restart cyclosporine 3/1.  - remains w/o seizure. Cont to have stutter/garbled speech/difficulty finding words.  Worse post EGD as sedation was given.  - MRI 3/6 unremarkable.  - EEG completed 3/8 - results pending.  - monitor closely. VSS.          At risk for opportunistic infections    - CMV detected 2/1 now on tx dose valcyte. CMV PCR 2/15 not detected. CMV PCR 2/22, negative.  - Repeat CMV PCR 3/8 negative.  CMV PCR weekly.  - Pentam given 2/24.            Delayed surgical wound healing    - wd vac removed 1/12/18.  Wound healing well. Aquacel AG and Mepilex border dressing applied via wound care recs.           Vitamin D deficiency    - start ergo 50K weekly.           Nausea and vomiting    - h/o constipation, reports having regular BM's with bowel regimen.  - vomiting episode x 1 at home. Anti-emetics ordered PRN.  - KUB repeated 1/14/18  given increased abdominal pain- mild, generalized bowel distention suggesting ileus.  Diet changed to clears as tolerated for bowel rest .  - DYLAN tube placed 1/16 for tube feeds. Not tolerating tube feeds, stopped 1/17/18.  - D/C dylan 1/19, now in place as of 1/25.  - chronic constipation, Cont bowel regimen.   - See protein calorie malnutrition.  - TF have been on hold since 2/20 for N/V. dylan d/c'd 2/23 as not tolerating TF  - 2/24 changes: Increased Reglan to 10 mg, D/C atovaquone, transitioned cyclo to Prograf, change twyla to BID, check Keppra level  - GI consulted. EGD 3/6 as with continued nausea/vomiting with po intake. Appreciate GI recs.    - Cont to have nausea and vomiting w medications and eating.  Cont TPN.     - holding reglan 3/8 due to neurologic symptoms.                VTE Risk Mitigation         Ordered     heparin (porcine) injection 1,000 Units  As needed (PRN)     Route:  Intra-Catheter        03/12/18 1134     heparin (porcine) injection 5,000 Units  Every 8 hours     Route:  Subcutaneous        03/08/18 1622     Medium Risk of VTE  Once      01/11/18 0351     Place sequential compression device  Until discontinued      01/11/18 0351          The patients clinical status was discussed at multidisplinary rounds, involving transplant surgery, transplant medicine, pharmacy, nursing, nutrition, and social work    Discharge Planning:  Monitor kidney function --> may need outpt HD  Monitor HH needs vs rehab for deconditioned status      Seda Camacho NP  Liver Transplant  [facility]

## 2018-03-13 LAB
ALBUMIN SERPL BCP-MCNC: 1.9 G/DL
ALP SERPL-CCNC: 102 U/L
ALT SERPL W/O P-5'-P-CCNC: 5 U/L
ANION GAP SERPL CALC-SCNC: 13 MMOL/L
ANISOCYTOSIS BLD QL SMEAR: SLIGHT
AST SERPL-CCNC: 26 U/L
BACTERIA BLD CULT: NORMAL
BACTERIA BLD CULT: NORMAL
BACTERIA SPEC ANAEROBE CULT: NORMAL
BASOPHILS # BLD AUTO: ABNORMAL K/UL
BASOPHILS NFR BLD: 0 %
BILIRUB SERPL-MCNC: 1.4 MG/DL
BUN SERPL-MCNC: 58 MG/DL
CALCIUM SERPL-MCNC: 8.5 MG/DL
CHLORIDE SERPL-SCNC: 102 MMOL/L
CO2 SERPL-SCNC: 24 MMOL/L
CREAT SERPL-MCNC: 3.2 MG/DL
CYCLOSPORINE BLD LC/MS/MS-MCNC: 63 NG/ML
DIFFERENTIAL METHOD: ABNORMAL
EOSINOPHIL # BLD AUTO: ABNORMAL K/UL
EOSINOPHIL NFR BLD: 0 %
ERYTHROCYTE [DISTWIDTH] IN BLOOD BY AUTOMATED COUNT: 19.2 %
EST. GFR  (AFRICAN AMERICAN): 28.9 ML/MIN/1.73 M^2
EST. GFR  (NON AFRICAN AMERICAN): 25 ML/MIN/1.73 M^2
GLUCOSE SERPL-MCNC: 119 MG/DL
HCT VFR BLD AUTO: 24.2 %
HGB BLD-MCNC: 8 G/DL
HYPOCHROMIA BLD QL SMEAR: ABNORMAL
IMM GRANULOCYTES # BLD AUTO: ABNORMAL K/UL
IMM GRANULOCYTES NFR BLD AUTO: ABNORMAL %
LYMPHOCYTES # BLD AUTO: ABNORMAL K/UL
LYMPHOCYTES NFR BLD: 4 %
MAGNESIUM SERPL-MCNC: 2.2 MG/DL
MCH RBC QN AUTO: 31 PG
MCHC RBC AUTO-ENTMCNC: 33.1 G/DL
MCV RBC AUTO: 94 FL
MONOCYTES # BLD AUTO: ABNORMAL K/UL
MONOCYTES NFR BLD: 3 %
NEUTROPHILS NFR BLD: 93 %
NRBC BLD-RTO: 0 /100 WBC
OVALOCYTES BLD QL SMEAR: ABNORMAL
PHOSPHATE SERPL-MCNC: 2.6 MG/DL
PLATELET # BLD AUTO: 184 K/UL
PMV BLD AUTO: 10.8 FL
POIKILOCYTOSIS BLD QL SMEAR: SLIGHT
POLYCHROMASIA BLD QL SMEAR: ABNORMAL
POTASSIUM SERPL-SCNC: 3.3 MMOL/L
PROT SERPL-MCNC: 5.7 G/DL
RBC # BLD AUTO: 2.58 M/UL
SODIUM SERPL-SCNC: 139 MMOL/L
WBC # BLD AUTO: 7.35 K/UL

## 2018-03-13 PROCEDURE — 99233 SBSQ HOSP IP/OBS HIGH 50: CPT | Mod: ,,, | Performed by: INTERNAL MEDICINE

## 2018-03-13 PROCEDURE — 80158 DRUG ASSAY CYCLOSPORINE: CPT

## 2018-03-13 PROCEDURE — 25000003 PHARM REV CODE 250: Performed by: NURSE PRACTITIONER

## 2018-03-13 PROCEDURE — 83735 ASSAY OF MAGNESIUM: CPT

## 2018-03-13 PROCEDURE — 63600175 PHARM REV CODE 636 W HCPCS: Performed by: NURSE PRACTITIONER

## 2018-03-13 PROCEDURE — 85007 BL SMEAR W/DIFF WBC COUNT: CPT

## 2018-03-13 PROCEDURE — 80053 COMPREHEN METABOLIC PANEL: CPT

## 2018-03-13 PROCEDURE — 97530 THERAPEUTIC ACTIVITIES: CPT

## 2018-03-13 PROCEDURE — 99900035 HC TECH TIME PER 15 MIN (STAT)

## 2018-03-13 PROCEDURE — 85027 COMPLETE CBC AUTOMATED: CPT

## 2018-03-13 PROCEDURE — 97535 SELF CARE MNGMENT TRAINING: CPT

## 2018-03-13 PROCEDURE — 99233 SBSQ HOSP IP/OBS HIGH 50: CPT | Mod: ,,, | Performed by: NURSE PRACTITIONER

## 2018-03-13 PROCEDURE — A4217 STERILE WATER/SALINE, 500 ML: HCPCS | Performed by: NURSE PRACTITIONER

## 2018-03-13 PROCEDURE — 84100 ASSAY OF PHOSPHORUS: CPT

## 2018-03-13 PROCEDURE — 25000003 PHARM REV CODE 250: Performed by: SURGERY

## 2018-03-13 PROCEDURE — 25000003 PHARM REV CODE 250: Performed by: PHYSICIAN ASSISTANT

## 2018-03-13 PROCEDURE — 63600175 PHARM REV CODE 636 W HCPCS: Mod: JG | Performed by: SURGERY

## 2018-03-13 PROCEDURE — S0030 INJECTION, METRONIDAZOLE: HCPCS | Performed by: NURSE PRACTITIONER

## 2018-03-13 PROCEDURE — B4185 PARENTERAL SOL 10 GM LIPIDS: HCPCS | Performed by: NURSE PRACTITIONER

## 2018-03-13 PROCEDURE — 20600001 HC STEP DOWN PRIVATE ROOM

## 2018-03-13 RX ORDER — OXYCODONE HYDROCHLORIDE 5 MG/1
5 TABLET ORAL NIGHTLY PRN
Status: DISCONTINUED | OUTPATIENT
Start: 2018-03-13 | End: 2018-04-10 | Stop reason: HOSPADM

## 2018-03-13 RX ORDER — OXYCODONE HYDROCHLORIDE 5 MG/1
5 TABLET ORAL EVERY 4 HOURS PRN
Status: DISCONTINUED | OUTPATIENT
Start: 2018-03-13 | End: 2018-03-30

## 2018-03-13 RX ORDER — PSEUDOEPHEDRINE/ACETAMINOPHEN 30MG-500MG
100 TABLET ORAL
Status: COMPLETED | OUTPATIENT
Start: 2018-03-13 | End: 2018-03-13

## 2018-03-13 RX ORDER — SYRING-NEEDL,DISP,INSUL,0.3 ML 29 G X1/2"
296 SYRINGE, EMPTY DISPOSABLE MISCELLANEOUS
Status: COMPLETED | OUTPATIENT
Start: 2018-03-13 | End: 2018-03-13

## 2018-03-13 RX ADMIN — BISACODYL 10 MG: 5 TABLET, COATED ORAL at 09:03

## 2018-03-13 RX ADMIN — PROCHLORPERAZINE EDISYLATE 10 MG: 5 INJECTION INTRAMUSCULAR; INTRAVENOUS at 01:03

## 2018-03-13 RX ADMIN — MAGNESIUM SULFATE HEPTAHYDRATE: 500 INJECTION, SOLUTION INTRAMUSCULAR; INTRAVENOUS at 10:03

## 2018-03-13 RX ADMIN — SOYBEAN OIL 250 ML: 20 INJECTION, SOLUTION INTRAVENOUS at 10:03

## 2018-03-13 RX ADMIN — URSODIOL 300 MG: 300 CAPSULE ORAL at 09:03

## 2018-03-13 RX ADMIN — OXYCODONE HYDROCHLORIDE 5 MG: 5 TABLET ORAL at 06:03

## 2018-03-13 RX ADMIN — CEFEPIME 1 G: 1 INJECTION, POWDER, FOR SOLUTION INTRAMUSCULAR; INTRAVENOUS at 05:03

## 2018-03-13 RX ADMIN — LEVETIRACETAM 500 MG: 100 SOLUTION ORAL at 01:03

## 2018-03-13 RX ADMIN — CYCLOSPORINE 125 MG: 100 CAPSULE, LIQUID FILLED ORAL at 05:03

## 2018-03-13 RX ADMIN — LEVETIRACETAM 500 MG: 100 SOLUTION ORAL at 10:03

## 2018-03-13 RX ADMIN — MAGNESIUM CITRATE 296 ML: 1.75 LIQUID ORAL at 06:03

## 2018-03-13 RX ADMIN — THERA TABS 1 TABLET: TAB at 09:03

## 2018-03-13 RX ADMIN — DOCUSATE SODIUM 100 MG: 100 CAPSULE, LIQUID FILLED ORAL at 09:03

## 2018-03-13 RX ADMIN — OMEPRAZOLE 40 MG: 40 CAPSULE, DELAYED RELEASE ORAL at 05:03

## 2018-03-13 RX ADMIN — LEVOTHYROXINE SODIUM 75 MCG: 75 TABLET ORAL at 05:03

## 2018-03-13 RX ADMIN — ONDANSETRON HYDROCHLORIDE 4 MG: 2 INJECTION, SOLUTION INTRAMUSCULAR; INTRAVENOUS at 09:03

## 2018-03-13 RX ADMIN — ONDANSETRON HYDROCHLORIDE 4 MG: 2 INJECTION, SOLUTION INTRAMUSCULAR; INTRAVENOUS at 10:03

## 2018-03-13 RX ADMIN — HEPARIN SODIUM 5000 UNITS: 5000 INJECTION, SOLUTION INTRAVENOUS; SUBCUTANEOUS at 12:03

## 2018-03-13 RX ADMIN — HYDROCORTISONE SODIUM SUCCINATE 50 MG: 100 INJECTION, POWDER, FOR SOLUTION INTRAMUSCULAR; INTRAVENOUS at 09:03

## 2018-03-13 RX ADMIN — HYDROCORTISONE SODIUM SUCCINATE 50 MG: 100 INJECTION, POWDER, FOR SOLUTION INTRAMUSCULAR; INTRAVENOUS at 12:03

## 2018-03-13 RX ADMIN — METRONIDAZOLE 500 MG: 500 INJECTION, SOLUTION INTRAVENOUS at 12:03

## 2018-03-13 RX ADMIN — SODIUM CHLORIDE: 9 INJECTION, SOLUTION INTRAVENOUS at 01:03

## 2018-03-13 RX ADMIN — Medication 100 ML: at 06:03

## 2018-03-13 RX ADMIN — HYDROCORTISONE SODIUM SUCCINATE 50 MG: 100 INJECTION, POWDER, FOR SOLUTION INTRAMUSCULAR; INTRAVENOUS at 05:03

## 2018-03-13 RX ADMIN — ACETAZOLAMIDE 500 MG: 500 INJECTION, POWDER, LYOPHILIZED, FOR SOLUTION INTRAVENOUS at 03:03

## 2018-03-13 RX ADMIN — SIROLIMUS 1 MG: 1 TABLET, SUGAR COATED ORAL at 09:03

## 2018-03-13 RX ADMIN — OXYCODONE HYDROCHLORIDE 5 MG: 5 TABLET ORAL at 12:03

## 2018-03-13 RX ADMIN — SODIUM CHLORIDE 500 ML: 0.9 INJECTION, SOLUTION INTRAVENOUS at 06:03

## 2018-03-13 RX ADMIN — METRONIDAZOLE 500 MG: 500 INJECTION, SOLUTION INTRAVENOUS at 05:03

## 2018-03-13 RX ADMIN — OXYCODONE HYDROCHLORIDE 5 MG: 5 TABLET ORAL at 07:03

## 2018-03-13 RX ADMIN — HEPARIN SODIUM 5000 UNITS: 5000 INJECTION, SOLUTION INTRAVENOUS; SUBCUTANEOUS at 05:03

## 2018-03-13 RX ADMIN — METRONIDAZOLE 500 MG: 500 INJECTION, SOLUTION INTRAVENOUS at 09:03

## 2018-03-13 NOTE — ASSESSMENT & PLAN NOTE
- Post op course complicated by fevers and hyperbilirubinemia.  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  - Liver US 1/8 showed 3.9 cm complex fluid collection anterior to right lobe and moderate nonspecific complex ascites inferior to transplant.   - IR placed drain 1/11/18, cell count negative for infection.  - PBS consulted. ERCP 1/17 with stone and sludge. Pt placed on Actigall - treated with 10 day course of abx.   - Bili elevated - liver u/s 1/28.  - Liver biopsy 1/23 without rejection.  - consulted PBS for ERCP - Choledocholithiasis was found along with sludge, stents placed 1/30.   - ERCP 2/19, overall unremarkable findings. Did place new larger stents. Will need repeat ERCP in 10 weeks. Bilirubin improving.  - Liver US reviewed w mod ascites.  Stent in CBD.    - Paracentesis 3/6 negative for infection, wbc 28 segs 3%.  - ERCP, EUS biopsy 3/9/18 - no rejection.

## 2018-03-13 NOTE — PROGRESS NOTES
Seen for follow up on transverse chevron wound     03/13/18 1240       Incision/Site 10/30/17 0911 abdomen transverse   Date First Assessed/Time First Assessed: 10/30/17 0911   Present Prior to Hospital Arrival?: Yes  Location: abdomen  Orientation: transverse   Wound Image    Incision WDL ex   Dressing Appearance Clean;Intact   Drainage Amount None   Appearance Pink;Epithelialization   Periwound Area Intact   Wound Edges (resolved)   Care Cleansed with:;Sterile normal saline   Dressing Applied;Hydrocolloid  (for protection of fragile new skin)     Wound has resolved . New epithelial tissue protected with hydrocolloid dressing.   Will follow weekly as skin is at risk for breakdown.  Zoë Murphy RN CWON  [id number]

## 2018-03-13 NOTE — PLAN OF CARE
Problem: Patient Care Overview  Goal: Plan of Care Review  Outcome: Ongoing (interventions implemented as appropriate)  Pt AAO x 4. Pt 1 person assist out of bed. Pt worked with PT today. Pt up in chair during day. Pt complaining of nausea. PRN medications providing mild relief. Pt complaining of pain to right side of abdomen. PRN pain medications given and frequency increased. Pt had abdominal and chest x-ray today. Pt on 1-2 L NC when needed. Pt complaining of some SOB. One time does of diamox to administered.   Pt wearing nonskid socks/shoes when out of bed, bed in lowest position, side rails up x 2, call light within reach. Mother at bedside.

## 2018-03-13 NOTE — ASSESSMENT & PLAN NOTE
- Maintenance IS with cyclosporine --> transitioned to Prograf 2/24 for continued nausea. However now with neurological symptoms with prograf and hx of seizure, prograf d/c and cyclosporine restarted 3/1.  - MMF on hold for infections and neutropenia.   - d/c cyclosporine started rapa & stress dose steroids 3/10  - mental status now improved, trial cyclosporine, d/c rapa, continue stress dose steroids, monitor.

## 2018-03-13 NOTE — PROGRESS NOTES
Notified Seda Camacho NP that patient reports he is in constant pain a 7/10 and that he wakes up in pain nightly.  NP states she is aware of patient's pain.  Continue with current plan of care.  Will continue to monitor patient.

## 2018-03-13 NOTE — PLAN OF CARE
Problem: Occupational Therapy Goal  Goal: Occupational Therapy Goal  Goals to be met by:  2 Weeks (3/24/2018)    Patient will increase functional independence with ADLs by performin. Supine to sit with Supervision.--met  2. Sit to Stand transfers with Supervision.   3. Toilet transfer to toilet with Supervision.  4. Grooming while standing with Supervision.  5. UE Dressing with Supervision.- met 3/12/18  6. LE Dressing with Minimal Assistance-met 3/12/18, revised: LE dress with spvn         Outcome: Ongoing (interventions implemented as appropriate)  Progressing. Alexis Sow  3/13/2018

## 2018-03-13 NOTE — ASSESSMENT & PLAN NOTE
- Enema ordered 1/20 and 1/21 with BM.  - Encourage ambulation.  - Decrease narcotics.  - D/c metamucil and miralax bid as makes patient nauseated.  - Increase reglan qid.  - Small bowel follow through with normal findings.   - Schedule bisacodyl for bowel regimen.   - KUB 2/21 with significant amounts of stool and gas. Pt declines miralax, metamucil, colace.   - encouraged suppository.  - Improved on KUB 2/23.  - Pt moving bowels now but will have to keep aggressive bowel regimen on board.   - Brown bomb 3/5 with BM x 1.  - KUB 3/8 with findings concerning for a partial bowel obstruction.  - KUB 3/13 with nonspecific bowel gas pattern, encouraged taking colace, will order enema

## 2018-03-13 NOTE — PLAN OF CARE
Problem: Patient Care Overview  Goal: Plan of Care Review  Outcome: Ongoing (interventions implemented as appropriate)  Pt has call bell in reach, non slip socks on, and bedrails up x2. Pt working with PT/OT during day. Pt encouraged to wash hands. Pt has prn nausea and pain meds.

## 2018-03-13 NOTE — PROGRESS NOTES
EZEKIEL left a v/m with  at [facility] ([phone]) in order to check the status of pt's referral for chronic dialysis. SW requesting call back and provided contact number where she can be reached. EZEKIEL remains available.

## 2018-03-13 NOTE — PROGRESS NOTES
[facility]  Liver Transplant  Progress Note    Patient Name: Jhonny Diana  MRN: [id number]  Admission Date: 2018  Hospital Length of Stay: 61 days  Code Status: Full Code  Primary Care Provider: Primary Doctor No  Post-Operative Day: 145    ORGAN:   LIVER  Disease Etiology: Acute Alcoholic Hepatitis  Donor Type:    - Brain Death  CDC High Risk:   No  Donor CMV Status:   Donor CMV Status: Positive  Donor HBcAB:   Negative  Donor HCV Status:   Negative  Whole or Partial: Whole Liver  Biliary Anastomosis: End to End  Arterial Anatomy: Standard  Subjective:     History of Present Illness:  Jhonny Diana is a 26 y/o male with past medical history of alcoholic cirrhosis.  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 Found to have peritonitis in November (WBC 5000, 75% PNM) neg for bile leak. He was treated initially with vanc/cefepime. Repeat cell counts  with some improvement (WBC 1400, 45% PNM). He has undergone multiple paracenteses as well as abscess drainage of perihepatic fluid collections and treated with antimicrobial therapy but no positive cultures. Of note, biliary stricture also identified and ERCP performed on 2017 with sphincterotomy and biliary stent placed. Liver tests still have not normalized despite intervention, bilirubin and AP remain elevated. Other pertinent PMH current wound vac in place 2/2 wound infection, malnutrition requiring TPN for short course and ongoing hypoalbuminemia, and seizure activity while on prograf and has since been switched to cyclosporine without reoccurrence.  He presented to the ER for fever, abdominal pain, and N/V. He reports fever (103) for 1  day prior. Overnight, he developed N/V, reports small amount of green emesis with new left sided pain. He also endorses worsening SOB with exertion. He was scheduled as an outpatient for follow up paracentesis and IR drainage of fluid collection. CXR in ER shows large pleural effusion with subsegmental atelectasis. Infectious work up initiated in ER. His ANC is 900. Broad spectrum antibiotics initiated in ED. He denies chest pain, palpitations, diarrhea, constipation, or back pain. Denies any sick contacts.    Hospital Course:  Thoracentesis (1.2L off), Paracentesis (1.6L off), and IR drainage of fluid collection 1/11, all fluids negative for infection. ID consulted. Broad spectrum antibiotics d/c'd 1/15. Chronically malnourished with poor PO intake, prealbumin 7. Shane tube placed 1/15 for tube feedings.     ERCP 1/17 with sludge and a biliary stone which was removed and stent exchanged.  Remained with n/v; therefore, TF remained on hold and TPN continued.   Pt with fever s/p ERCP on 1/17 which continued until 1/19.  Vanc/cefepime restarted.  Blood cx 1/17 and 1/18 NGTD.  ID reconsulted.  Fungal markers sent.  CT C/A/P obtained.  With large R pleural effusion and ascites- both drained 1/19 and negative for infx per cell count. Pt afebrile 1/20. Liver biopsy 1/23 - without rejection.  EGD 1/25 - unremarkable for source of GI symptoms.  Resume diet along with other GI recommendations. SHANE tube placed for tube feeds.       2/22: Pt again spiked temp of 101 overnight, now down to 99. Resent Bcx overnight, prelim NGTD. Transitioned to Erta from vanc/zosyn per ID. Planning for tagged WBC scan on Monday. R sided abdominal erythema/dependent edema improving today.  TF have been on hold since 2/20. Will continue to hold today per pt request. Pt working on diet. KUB yesterday with significant stool and gas. Declines colace, miralax, metamucil, but willing to take daily bisacodyl. Pt agreeable to suppository today. If  suppository not successful can give mag citrate through dylan tonight. Will also retrial Marinol tonight as pt reported improvement in appetite previously. Will monitor closely for sedation.     2/23:  2 small BM 2/22.  Tolerated HD.  [place] removed.  Will attempt to eat this PM.      2/24:  Pt ate then vomited hamburger 2/23.  Will resume TPN.  D/C atovaquone, transition cyclo to Prograf, check Keppra level, increase Reglan to 0 mg, change twyla to BID for nausea.  Will give pentam today.    2/25:  TPN going well, pt ate 1/2 subway sandwhich without vomiting yesterday, will continue to monitor.  Plan to repeat EGD this week.  Pt with drop in H/h again and appears to be hemolyzing.  LDH elevated, hapto slightly low, LEE pending.  With neutropenia- Neupogen given.    2/26:  Pt with severe back pain s/p HD. Unclear if secondary to neupogen.  Repeat labs stable. Indium scan scheduled for 2/27- injection done today (back pain started prior). CT A/P obtained with fluid collection seen in pelvis.    2/27: Patient spiking fevers, 101. Indium scan, negative results. CT A/P obtained 2/26 with focal/loculated region of fluid in the lower right abdomen measuring approximately 8.7 x 5.5 cm. IR attempted drainage, however, per IR,there was  limited fluid interposed with bowel in the RLQ with no safe pocket seen for drainage.    2/28: Remains with low grade fevers. Neupogen given.     3/1: Patient with stuttering speech, trouble recalling specific words when speaking. Does have hx of seizure (on keppra) previously while on prograf. Switched to prograf 2/24 from cyclosporine. Neuro exam unremarkable. CT head without contrast obtained, unremarkable. In light of these symptoms, prograf d/c, cyclosporine restarted 3/1. Per transplant attending, held off on EGD at this time as concern anesthesia would put patient at further increase risk for seizure activity.     3/4: pt walked in room yesterday.  Had 1 BM.  No appetite still.  Cont TPN.   He reports swelling and back pain today.  He vomited after taking Oxy PO.  Fentanyl 12.5mg IV ordered.  Patient rested well afterward.  Remains afebrile.   ID following.   Cont Ertapenem.  Repeat blood and urine cx given continued malaise and stutter- blood cx prelim NGTD.  UA ok.  Urine cx pending.    Tentative plan for EGD on Monday to assess source of fevers/nausea/abdominal pain w bx for CMV and GVHD.  Will also reassess rather able to perform paracentesis.     3/5: no acute events overnight. Pt still not feeling well and with poor sleep overnight. Cont to have mumbling of speech at times but slightly better than before. Remains with abdominal pain but relieved with PO pain meds. Tolerated HD well today. Will plan for paracentesis and EGD tomorrow to assess for peritonitis and cause of persistent N/V respectively.     3/6: pt continues to have garbled/mumbled speech. EGD performed today and pt received sedation during procedure. Increased lethargy and dysarthria noted post procedure. VS stable. Paracentesis scheduled today as well to r/o peritonitis. Wbc count stable. Afebrile. Continue Ertapenem. HD yesterday and pt tolerated well, 2.5 L removed. Monitor.     3/7: MRI brain yesterday unremarkable. Blood cultures ngtd so far. Neurology consulted. EGD and Paracentesis yesterday. Para with 25 cc removed, wbc 28 segs 3%. Continue TPN. Monitor.    3/8: EUS and ERCP scheduled tomorrow. NPO after midnight. T bili continues trending up daily. Last ERCP on 2/19 with two stents placed. Ertapenem dc'd 3/7. Pt spiking temps today, tmax 100.9. Cefepime/vanc started. Oxycodone decreased to 5 mg q6h prn. Dc marinol and reglan for now. He continues to slur/stutter and have jerky movements throughout the day. Neurology following. EEG scheduled today. Monitor.     3/9: EUS/ERCP completed with choledoliathiasis.  Fever over night.  May need LP in future if mental status does not improve.      3/10- speech improved, neuro  suggesting lumbar puncture - schedule for Monday    3/11 - more lethargic, tmax 100.9, plan for lumbar puncture in am.    3/12 - mental status greatly improved, walking halls with PT, will hold off on LP for now.    3/13 - mental status remains stable, complains of pain to lower abdomen, bowel movement with suppository over night, KUB with non specific bowel gas pattern, would like to increase pain regimen - spoke with patient and mother this was counter intuitive as narcotics slow down the bowels, patient states understanding - will take colace religiously with increased pain medicine.  Also complaining of sob - chest xray and dose of diamox    Scheduled Meds:   acetaZOLAMIDE (DIAMOX) IVPB  500 mg Intravenous Once    bisacodyl  10 mg Oral Daily    ceFEPime (MAXIPIME) IVPB  1 g Intravenous Q12H    cycloSPORINE modified  125 mg Oral BID    docusate sodium  100 mg Oral BID    epoetin maurisio (PROCRIT) injection  10,000 Units Intravenous Every Mon, Wed, Fri    ergocalciferol  50,000 Units Oral Q7 Days    fat emulsion 20%  250 mL Intravenous Once    heparin (porcine)  5,000 Units Subcutaneous Q8H    hydrocortisone sodium succinate  50 mg Intravenous Q8H    custom IVPB builder   Intravenous Q24H    levetiracetam oral soln  500 mg Oral BID    levothyroxine  75 mcg Oral Before breakfast    metronidazole  500 mg Intravenous Q8H    multivitamin  1 tablet Oral Daily    omeprazole  40 mg Oral QAM    polyethylene glycol  17 g Oral BID    psyllium husk (aspartame)  3.4 g Oral BID    ursodiol  300 mg Oral BID     Continuous Infusions:   TPN ADULT CENTRAL LINE CUSTOM 48 mL/hr at 03/12/18 2122    TPN ADULT CENTRAL LINE CUSTOM       PRN Meds:sodium chloride, sodium chloride, sodium chloride 0.9%, sodium chloride 0.9%, acetaminophen, albuterol-ipratropium 2.5mg-0.5mg/3mL, bisacodyl, dextrose 50%, dextrose 50%, diphenhydrAMINE-zinc acetate 1-0.1%, glucagon (human recombinant), glucose, glucose, heparin (porcine),  naloxone, ondansetron, ondansetron, oxyCODONE, oxyCODONE, prochlorperazine, simethicone, sodium chloride 0.9%, sodium chloride 0.9%    Review of Systems   Constitutional: Positive for activity change, appetite change and fatigue.   Gastrointestinal: Positive for abdominal distention, abdominal pain and constipation.   Genitourinary: Positive for decreased urine volume.   Allergic/Immunologic: Positive for immunocompromised state.     Objective:     Vital Signs (Most Recent):  Temp: 98.7 °F (37.1 °C) (03/13/18 1123)  Pulse: 78 (03/13/18 1123)  Resp: 16 (03/13/18 1123)  BP: 128/70 (03/13/18 1123)  SpO2: 96 % (03/13/18 1123) Vital Signs (24h Range):  Temp:  [98 °F (36.7 °C)-98.9 °F (37.2 °C)] 98.7 °F (37.1 °C)  Pulse:  [78-94] 78  Resp:  [16-20] 16  SpO2:  [92 %-97 %] 96 %  BP: (124-137)/(69-84) 128/70     Weight: 74.2 kg (163 lb 9.3 oz)  Body mass index is 26.4 kg/m².    Intake/Output - Last 3 Shifts       03/11 0700 - 03/12 0659 03/12 0700 - 03/13 0659 03/13 0700 - 03/14 0659    P.O. 20 600     Blood  350     Other  600     IV Piggyback 400 400     .6 1707.6     Total Intake(mL/kg) 1341.6 (18.1) 3657.6 (49.3)     Urine (mL/kg/hr) 0 (0) 250 (0.1)     Emesis/NG output 0 (0)      Other 0 (0) 3600 (2)     Stool 0 (0) 0 (0)     Blood 0 (0)      Total Output 0 3850      Net +1341.6 -192.4             Urine Occurrence 0 x      Stool Occurrence 0 x 1 x     Emesis Occurrence 0 x            Physical Exam   Constitutional: He is oriented to person, place, and time. He appears well-developed.   Hand and temporal muscle wasting   HENT:   Head: Normocephalic.   Eyes: Pupils are equal, round, and reactive to light. Scleral icterus is present.   Cardiovascular: Normal rate, regular rhythm, normal heart sounds and intact distal pulses.    Pulmonary/Chest: Effort normal and breath sounds normal.   Abdominal: Bowel sounds are normal. He exhibits distension. There is no tenderness.   Musculoskeletal: Normal range of motion. He  exhibits edema.   Neurological: He is alert and oriented to person, place, and time. GCS eye subscore is 4. GCS verbal subscore is 5. GCS motor subscore is 6.   Skin: Skin is warm and dry.   Psychiatric: He has a normal mood and affect. His behavior is normal. Thought content normal.   Nursing note and vitals reviewed.      Laboratory:  Immunosuppressants         Stop Route Frequency     cycloSPORINE modified capsule 125 mg      -- Oral 2 times daily     cycloSPORINE modified (NEORAL) 25 MG capsule      03/09 1714       cycloSPORINE modified (NEORAL) 100 MG capsule      03/09 1714       cycloSPORINE modified (NEORAL) 100 MG capsule      03/09 0514       cycloSPORINE modified (NEORAL) 25 MG capsule      03/09 0514          CBC:     Recent Labs  Lab 03/13/18  0500   WBC 7.35   RBC 2.58*   HGB 8.0*   HCT 24.2*      MCV 94   MCH 31.0   MCHC 33.1     CMP:     Recent Labs  Lab 03/13/18  0500   *   CALCIUM 8.5*   ALBUMIN 1.9*   PROT 5.7*      K 3.3*   CO2 24      BUN 58*   CREATININE 3.2*   ALKPHOS 102   ALT 5*   AST 26   BILITOT 1.4*     Coagulation:     Recent Labs  Lab 03/12/18  1004   INR 1.1     Labs within the past 24 hours have been reviewed.    Diagnostic Results:  None    Assessment/Plan:     Fever    - Started on vanc/cefepime for ERCP. Now transitioned to vanc/zosyn.  - Blood cultures sent, prelim NGTD. Repeat blood cultures and chest xray today 2/10.  - thoracentesis 2/1 - fluid negative for infection.  - atelectasis noted on ct scan - start breathing tx & CPT.  - now cmv positive - started treatment 2/5/18.  - taken to OR 2/7 for exp lap - fluid collections drained, cultures pending.  - Patient with low grade fever 2/15, 100.3.  - CT A/P 2/15 with no evidence of IA fluid infection. Did show R pleural effusion s/p thoracentesis, cell count negative for infection, cx pending.  - Repeat blood cx 2/15 prelim NGTD, CMV PCR 2/15 negative   Complaining of abdominal pain. Is tender in RUQ.  -  Remains with fevers.  - Bilirubin remains elevated. Concerning for cholangitis. Antibiotics broadened 2/17. ID re consulted.   - ERCP  2/19, overall unremarkable findings. Did place new larger stents. Will need repeat ERCP in 10 weeks. Bilirubin is stable post ERCP.  -Tmax 101 2/21 PM again ID following. Plan to transition vanc/zosyn to ertapenem 2/22 as no improvement noted.   - Erta started 2/22.  - CT A/P obtained 2/26 with focal/loculated region of fluid in the lower right abdomen measuring approximately 8.7 x 5.5 cm.   - IR attempted drainage on fluid collection on 2/27, however, per IR,there was limited fluid interposed with bowel in the RLQ with no safe pocket seen for drainage.  -Tagged WBC scan 2/27, negative results.  - Remains with low grade fevers.   - Per transplant attending, will hold off on EGD at this time as concern anesthesia will put patient at further increase risk for seizure activity. Will plan for potenial EGD on Monday. Discussed with GI, appreciate their assistance.  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 EBV PCR negative. Adenovirus pending. Repeat CMV PCR negative.    - Back pain and abdominal pain persists. Last BM 3/5 with brown bomb.   - ID following. Cont Ertapenem dc'd 3/7 - due to AMS   - tmax 101.1 overnight 3/9 - Cefepime/vanc/flagyl currently.  Monitor.   - has been afebrile for last 72 hours          Severe protein-calorie malnutrition    - Poor PO intake since transplant requiring short course of TPN during previous hospital stay.   - albumin remains decreased but appetite slowly improving per pt.   - Dietary consulted. Will need to closely monitor PO intake.   - supplements also ordered.   - SHANE with tube feeding start 1/16. Stopped 1/17/18, was not tolerating. [place] removed 1/20.  - prealbumin 7 on 1/15.  - TPN started 1/16/18.   - Pt able to eat minimally 1/21 and 1/22 which is an improvement.  - GI consulted - EGD reviewed, increased ppi bid, miralax bid, and  metamucil bid, decreased narcotics.  - continue reglan, placed SHANE tube with tube feeds - transitioned to nightly feeds. Not tolerating well due to nausea.   - small bowel follow through with normal findings.   - consulted gi for possible gj tube placement, holding off on this for now as with ongoing fevers.  - Dietary changed tube feeds back to 24 hours from nightly 2/19 as not eating enough during the day to meet caloric needs. TF on hold since 2/20.  - retrial marinol 2/22 as pt reports that's the only thing helping his appetite, in the evening 2/2 concern for possible drowsiness  - Pt then developed persistent N/V again and TPN started 2/24. Cont TPN for now.   - Pt able to eat 1/4 sandwich without vomiting 2/25.  Not been able to eat anything more than that in 48 hours per pt and mother.    - EGD 3/6 with normal esophagus    - congested, erythematous and nodular mucosa in the stomach.    - biliary stent in the duodenum    - one duodenal ulcer with a clean ulcer base (Emir Class III).    - biopsies taken in the duodenal bulb and second and third portion of the duodenum.  - hold reglan for neuro symptoms.  - continue TPN, tolerating diet today, monitor        Acute renal failure with tubular necrosis    - HD resumed on previous admission. Now anuric and dialyzes M-W-F.  - Nephrology following.    - Ktm consulted for possible kidney transplant.        Other cytomegaloviral diseases    - detected at 370 on CMV PCR 2/1.  - case discussed with ID and will hold off on treatment at this time given low WBC.   - undetected CMV PCR 2/8.  - continue Valcyte 200 mg every Mon, Wed, Fri.  - CMV PCR 2/15 negative.  - Per ID, valcyte dose decreased to ppx dose on 2/18.  - CMV PCR 2/22 negative.   - CMV PCR 3/8 pending.  - Repeat scope 3/6 to assess persistent N/V and possible CMV causing these symptoms. Biopsies thus far normal, cmv stains pending.        Liver transplanted    - Post op course complicated by fevers and  hyperbilirubinemia.  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  - Liver US 1/8 showed 3.9 cm complex fluid collection anterior to right lobe and moderate nonspecific complex ascites inferior to transplant.   - IR placed drain 1/11/18, cell count negative for infection.  - PBS consulted. ERCP 1/17 with stone and sludge. Pt placed on Actigall - treated with 10 day course of abx.   - Bili elevated - liver u/s 1/28.  - Liver biopsy 1/23 without rejection.  - consulted PBS for ERCP - Choledocholithiasis was found along with sludge, stents placed 1/30.   - ERCP 2/19, overall unremarkable findings. Did place new larger stents. Will need repeat ERCP in 10 weeks. Bilirubin improving.  - Liver US reviewed w mod ascites.  Stent in CBD.    - Paracentesis 3/6 negative for infection, wbc 28 segs 3%.  - ERCP, EUS biopsy 3/9/18 - no rejection.        Constipation    - Enema ordered 1/20 and 1/21 with BM.  - Encourage ambulation.  - Decrease narcotics.  - D/c metamucil and miralax bid as makes patient nauseated.  - Increase reglan qid.  - Small bowel follow through with normal findings.   - Schedule bisacodyl for bowel regimen.   - KUB 2/21 with significant amounts of stool and gas. Pt declines miralax, metamucil, colace.   - encouraged suppository.  - Improved on KUB 2/23.  - Pt moving bowels now but will have to keep aggressive bowel regimen on board.   - [place] bomb 3/5 with BM x 1.  - KUB 3/8 with findings concerning for a partial bowel obstruction.  - KUB 3/13 with nonspecific bowel gas pattern, encouraged taking colace, will order enema        Long-term use of immunosuppressant medication    - Maintenance IS with cyclosporine --> transitioned to Prograf 2/24 for continued nausea. However now with neurological symptoms with prograf and hx of seizure, prograf d/c and cyclosporine restarted 3/1.  - MMF on hold for infections and neutropenia.   - d/c cyclosporine started rapa & stress dose steroids 3/10  -  mental status now improved, trial cyclosporine, d/c rapa, continue stress dose steroids, monitor.        Prophylactic immunotherapy    - See long term use of immunosuppression.         Anemia of chronic disease    - H&H low 3/12, transfused 1 unit prbc  - h/h responded well, monitor labs daily        Recurrent pleural effusion on right    - CXR in ER with large right pleural effusion with subjective c/o worsening SOB.  - thoracentesis completed- 1200 ml removed.  SOB with significant improvement.   - Cell count reviewed and negative for infection.   - resp even and non labored.  O2 sat 94-98%.  - Repeat thora 1/19 negative for infection per cell count.  - Reaccumulating fluid on xray 1/21.  - thoracentesis 1/31 - 1.4L removed, negative for infection.  - Reaccumulating fluid again on xray 2/10.   - Thoracentesis performed  2/16 with 1500 cc off, cell count neg for infection, cx no growth to date.   - CT A/P 2/26 with large R pleural effusion, may need repeat thora if fevers reoccur.    - complaining of sob, obtain chest xray, trial dose of diamox        Biliary stricture of transplanted liver    - tbili with increase prompting ERCP 1/30 with stones and sludge, stents placed.  - Repeat ERCP 2/19 as was having recurrent fevers with bilirubin stuck at 2.0-2.2 with overall unremarkable findings seen. Did place new larger stents. Bilirubin was improving. Now trending up daily again. Needed repeat ERCP in 10 weeks.   - US 3/2 stent in CBD.  - ercp/eus 3/9 with mental stent placed in bile duct, stones removed        Seizure    - Patient with stuttering speech, trouble recalling specific words when speaking. Does have hx of seizure (on keppra) previously while on prograf. Switched to prograf over the weekend from cyclosporine. Neuro exam unremarkable.   - CT head without contrast obtained, unremarkable.   - In light of these symptoms, prograf d/c, restart cyclosporine 3/1.  - remains w/o seizure. Cont to have stutter/garbled  speech/difficulty finding words.  Worse post EGD as sedation was given.  - MRI 3/6 unremarkable.  - EEG completed 3/8 - results pending.  - monitor closely. VSS.          At risk for opportunistic infections    - CMV detected 2/1 now on tx dose valcyte. CMV PCR 2/15 not detected. CMV PCR 2/22, negative.  - Repeat CMV PCR 3/8 negative.  CMV PCR weekly.  - Pentam given 2/24.            Delayed surgical wound healing    - wd vac removed 1/12/18.  Wound healing well. Aquacel AG and Mepilex border dressing applied via wound care recs.           Vitamin D deficiency    - start ergo 50K weekly.           Nausea and vomiting    - h/o constipation, reports having regular BM's with bowel regimen.  - vomiting episode x 1 at home. Anti-emetics ordered PRN.  - KUB repeated 1/14/18 given increased abdominal pain- mild, generalized bowel distention suggesting ileus.  Diet changed to clears as tolerated for bowel rest .  - SHANE tube placed 1/16 for tube feeds. Not tolerating tube feeds, stopped 1/17/18.  - D/C shane 1/19, now in place as of 1/25.  - chronic constipation, Cont bowel regimen.   - See protein calorie malnutrition.  - TF have been on hold since 2/20 for N/V. shane d/c'd 2/23 as not tolerating TF  - 2/24 changes: Increased Reglan to 10 mg, D/C atovaquone, transitioned cyclo to Prograf, change twyla to BID, check Keppra level  - GI consulted. EGD 3/6 as with continued nausea/vomiting with po intake. Appreciate GI recs.    - Cont to have nausea and vomiting w medications and eating.  Cont TPN.     - holding reglan 3/8 due to neurologic symptoms.                VTE Risk Mitigation         Ordered     heparin (porcine) injection 1,000 Units  As needed (PRN)     Route:  Intra-Catheter        03/12/18 1134     heparin (porcine) injection 5,000 Units  Every 8 hours     Route:  Subcutaneous        03/08/18 1622     Medium Risk of VTE  Once      01/11/18 0351     Place sequential compression device  Until discontinued      01/11/18  0351          The patients clinical status was discussed at multidisplinary rounds, involving transplant surgery, transplant medicine, pharmacy, nursing, nutrition, and social work    Discharge Planning:  Monitor kidney function --> may need outpt HD  Monitor HH needs vs rehab for deconditioned status      Seda Camacho, NP  Liver Transplant  [facility]

## 2018-03-13 NOTE — SUBJECTIVE & OBJECTIVE
Scheduled Meds:   acetaZOLAMIDE (DIAMOX) IVPB  500 mg Intravenous Once    bisacodyl  10 mg Oral Daily    ceFEPime (MAXIPIME) IVPB  1 g Intravenous Q12H    cycloSPORINE modified  125 mg Oral BID    docusate sodium  100 mg Oral BID    epoetin maurisio (PROCRIT) injection  10,000 Units Intravenous Every Mon, Wed, Fri    ergocalciferol  50,000 Units Oral Q7 Days    fat emulsion 20%  250 mL Intravenous Once    heparin (porcine)  5,000 Units Subcutaneous Q8H    hydrocortisone sodium succinate  50 mg Intravenous Q8H    custom IVPB builder   Intravenous Q24H    levetiracetam oral soln  500 mg Oral BID    levothyroxine  75 mcg Oral Before breakfast    metronidazole  500 mg Intravenous Q8H    multivitamin  1 tablet Oral Daily    omeprazole  40 mg Oral QAM    polyethylene glycol  17 g Oral BID    psyllium husk (aspartame)  3.4 g Oral BID    ursodiol  300 mg Oral BID     Continuous Infusions:   TPN ADULT CENTRAL LINE CUSTOM 48 mL/hr at 03/12/18 2122    TPN ADULT CENTRAL LINE CUSTOM       PRN Meds:sodium chloride, sodium chloride, sodium chloride 0.9%, sodium chloride 0.9%, acetaminophen, albuterol-ipratropium 2.5mg-0.5mg/3mL, bisacodyl, dextrose 50%, dextrose 50%, diphenhydrAMINE-zinc acetate 1-0.1%, glucagon (human recombinant), glucose, glucose, heparin (porcine), naloxone, ondansetron, ondansetron, oxyCODONE, oxyCODONE, prochlorperazine, simethicone, sodium chloride 0.9%, sodium chloride 0.9%    Review of Systems   Constitutional: Positive for activity change, appetite change and fatigue.   Gastrointestinal: Positive for abdominal distention, abdominal pain and constipation.   Genitourinary: Positive for decreased urine volume.   Allergic/Immunologic: Positive for immunocompromised state.     Objective:     Vital Signs (Most Recent):  Temp: 98.7 °F (37.1 °C) (03/13/18 1123)  Pulse: 78 (03/13/18 1123)  Resp: 16 (03/13/18 1123)  BP: 128/70 (03/13/18 1123)  SpO2: 96 % (03/13/18 1123) Vital Signs (24h  Range):  Temp:  [98 °F (36.7 °C)-98.9 °F (37.2 °C)] 98.7 °F (37.1 °C)  Pulse:  [78-94] 78  Resp:  [16-20] 16  SpO2:  [92 %-97 %] 96 %  BP: (124-137)/(69-84) 128/70     Weight: 74.2 kg (163 lb 9.3 oz)  Body mass index is 26.4 kg/m².    Intake/Output - Last 3 Shifts       03/11 0700 - 03/12 0659 03/12 0700 - 03/13 0659 03/13 0700 - 03/14 0659    P.O. 20 600     Blood  350     Other  600     IV Piggyback 400 400     .6 1707.6     Total Intake(mL/kg) 1341.6 (18.1) 3657.6 (49.3)     Urine (mL/kg/hr) 0 (0) 250 (0.1)     Emesis/NG output 0 (0)      Other 0 (0) 3600 (2)     Stool 0 (0) 0 (0)     Blood 0 (0)      Total Output 0 3850      Net +1341.6 -192.4             Urine Occurrence 0 x      Stool Occurrence 0 x 1 x     Emesis Occurrence 0 x            Physical Exam   Constitutional: He is oriented to person, place, and time. He appears well-developed.   Hand and temporal muscle wasting   HENT:   Head: Normocephalic.   Eyes: Pupils are equal, round, and reactive to light. Scleral icterus is present.   Cardiovascular: Normal rate, regular rhythm, normal heart sounds and intact distal pulses.    Pulmonary/Chest: Effort normal and breath sounds normal.   Abdominal: Bowel sounds are normal. He exhibits distension. There is no tenderness.   Musculoskeletal: Normal range of motion. He exhibits edema.   Neurological: He is alert and oriented to person, place, and time. GCS eye subscore is 4. GCS verbal subscore is 5. GCS motor subscore is 6.   Skin: Skin is warm and dry.   Psychiatric: He has a normal mood and affect. His behavior is normal. Thought content normal.   Nursing note and vitals reviewed.      Laboratory:  Immunosuppressants         Stop Route Frequency     cycloSPORINE modified capsule 125 mg      -- Oral 2 times daily     cycloSPORINE modified (NEORAL) 25 MG capsule      03/09 1714       cycloSPORINE modified (NEORAL) 100 MG capsule      03/09 1714       cycloSPORINE modified (NEORAL) 100 MG capsule       03/09 0514       cycloSPORINE modified (NEORAL) 25 MG capsule      03/09 0514          CBC:     Recent Labs  Lab 03/13/18  0500   WBC 7.35   RBC 2.58*   HGB 8.0*   HCT 24.2*      MCV 94   MCH 31.0   MCHC 33.1     CMP:     Recent Labs  Lab 03/13/18  0500   *   CALCIUM 8.5*   ALBUMIN 1.9*   PROT 5.7*      K 3.3*   CO2 24      BUN 58*   CREATININE 3.2*   ALKPHOS 102   ALT 5*   AST 26   BILITOT 1.4*     Coagulation:     Recent Labs  Lab 03/12/18  1004   INR 1.1     Labs within the past 24 hours have been reviewed.    Diagnostic Results:  None

## 2018-03-13 NOTE — PROGRESS NOTES
Pt having pain in abdomen area. I notified RIMA west on call. She came to bedside to assess pt and ordered simethicone and suppository. Pt took both and then asked for extra pain med. Holli ordered 50mg tramadol that the pt refused. Holli then ordered a 1x order 5mg oxy now. I have also educated pt the importance of taking the prescribed colace, pysillium, and miralax, but they do not seem willing to take those meds to prevent these stomach pains.

## 2018-03-13 NOTE — ASSESSMENT & PLAN NOTE
- Started on vanc/cefepime for ERCP. Now transitioned to vanc/zosyn.  - Blood cultures sent, prelim NGTD. Repeat blood cultures and chest xray today 2/10.  - thoracentesis 2/1 - fluid negative for infection.  - atelectasis noted on ct scan - start breathing tx & CPT.  - now cmv positive - started treatment 2/5/18.  - taken to OR 2/7 for exp lap - fluid collections drained, cultures pending.  - Patient with low grade fever 2/15, 100.3.  - CT A/P 2/15 with no evidence of IA fluid infection. Did show R pleural effusion s/p thoracentesis, cell count negative for infection, cx pending.  - Repeat blood cx 2/15 prelim NGTD, CMV PCR 2/15 negative   Complaining of abdominal pain. Is tender in RUQ.  - Remains with fevers.  - Bilirubin remains elevated. Concerning for cholangitis. Antibiotics broadened 2/17. ID re consulted.   - ERCP  2/19, overall unremarkable findings. Did place new larger stents. Will need repeat ERCP in 10 weeks. Bilirubin is stable post ERCP.  -Tmax 101 2/21 PM again ID following. Plan to transition vanc/zosyn to ertapenem 2/22 as no improvement noted.   - Erta started 2/22.  - CT A/P obtained 2/26 with focal/loculated region of fluid in the lower right abdomen measuring approximately 8.7 x 5.5 cm.   - IR attempted drainage on fluid collection on 2/27, however, per IR,there was limited fluid interposed with bowel in the RLQ with no safe pocket seen for drainage.  -Tagged WBC scan 2/27, negative results.  - Remains with low grade fevers.   - Per transplant attending, will hold off on EGD at this time as concern anesthesia will put patient at further increase risk for seizure activity. Will plan for potenial EGD on Monday. Discussed with GI, appreciate their assistance.  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 EBV PCR negative. Adenovirus pending. Repeat CMV PCR negative.    - Back pain and abdominal pain persists. Last BM 3/5 with brown bomb.   - ID following. Cont Ertapenem dc'd 3/7 - due to  AMS   - tmax 101.1 overnight 3/9 - Cefepime/vanc/flagyl currently.  Monitor.   - has been afebrile for last 72 hours

## 2018-03-13 NOTE — ASSESSMENT & PLAN NOTE
- CXR in ER with large right pleural effusion with subjective c/o worsening SOB.  - thoracentesis completed- 1200 ml removed.  SOB with significant improvement.   - Cell count reviewed and negative for infection.   - resp even and non labored.  O2 sat 94-98%.  - Repeat thora 1/19 negative for infection per cell count.  - Reaccumulating fluid on xray 1/21.  - thoracentesis 1/31 - 1.4L removed, negative for infection.  - Reaccumulating fluid again on xray 2/10.   - Thoracentesis performed  2/16 with 1500 cc off, cell count neg for infection, cx no growth to date.   - CT A/P 2/26 with large R pleural effusion, may need repeat thora if fevers reoccur.    - complaining of sob, obtain chest xray, trial dose of diamox

## 2018-03-13 NOTE — PT/OT/SLP PROGRESS
Occupational Therapy   Treatment    Name: Jhonny Diana  MRN: [id number]  Admitting Diagnosis:  Fever  4 Days Post-Op    Recommendations:     Discharge Recommendations: home with home health  Discharge Equipment Recommendations:  bath bench  Barriers to discharge:  None    Subjective     Communicated with: nurse prior to session.  Pain/Comfort:  · Pain Rating 1: 8/10  · Location 1: abdomen  · Pain Addressed 1: Distraction, Nurse notified  · Pain Rating Post-Intervention 1: 8/10    Patients cultural, spiritual, Gnosticist conflicts given the current situation: none    Objective:     Patient found with: central line, telemetry (supine in bed)    General Precautions: Standard, fall   Orthopedic Precautions:N/A   Braces: N/A     Occupational Performance:    Bed Mobility:    · Patient completed Scooting/Bridging with modified independence  · Patient completed Supine to Sit with modified independence     Functional Mobility/Transfers:  · Patient completed Sit <> Stand Transfer with stand by assistance  with  rolling walker   · Patient completed Bed <> Chair Transfer using Stand Pivot technique with stand by assistance with rolling walker  · Functional Mobility: Pt ambulated in room and out in hallways approx 300ft with RW; w/c following and IV being managed with SBA    Activities of Daily Living:  · UB Dressing: supervision standing to lenora zip up jacket  · LB Dressing: setup to lenora slippers seated EOB    Patient left up in chair with all lines intact and call button in reach    AMPA 6 Click:  [facility] Total Score: 19    Treatment & Education:  Pt performed green theraband exercises with BUEs seated in chair x 15 reps through 4 exercises and sup for direction  Pt educated on safety with RW; monitoring dizziness and weakness  Pts mom is indep with assisting pt as needed  Education:    Assessment:     Jhonny Diana is a 27 y.o. male with a medical diagnosis of Fever.  Pt tolerated tx and demonstrated increased indep with dressing  and functional mobility and cognition.  Performance deficits affecting function are weakness, impaired endurance, impaired self care skills, impaired balance, gait instability, impaired functional mobilty, impaired cognition, decreased coordination, impaired skin, edema, impaired cardiopulmonary response to activity, pain.      Rehab Prognosis:  Good ; patient would benefit from acute skilled OT services to address these deficits and reach maximum level of function.       Plan:     Patient to be seen 4 x/week to address the above listed problems via self-care/home management, therapeutic activities, therapeutic exercises  · Plan of Care Expires: 18  · Plan of Care Reviewed with: patient, mother    This Plan of care has been discussed with the patient who was involved in its development and understands and is in agreement with the identified goals and treatment plan    GOALS:    Occupational Therapy Goals        Problem: Occupational Therapy Goal    Goal Priority Disciplines Outcome Interventions   Occupational Therapy Goal     OT, PT/OT Ongoing (interventions implemented as appropriate)    Description:  Goals to be met by:  2 Weeks (3/24/2018)    Patient will increase functional independence with ADLs by performin. Supine to sit with Supervision.--met  2. Sit to Stand transfers with Supervision.   3. Toilet transfer to toilet with Supervision.  4. Grooming while standing with Supervision.  5. UE Dressing with Supervision.- met 3/12/18  6. LE Dressing with Minimal Assistance-met 3/12/18, revised: LE dress with spvn                           Time Tracking:     OT Date of Treatment: 18  OT Start Time: 922  OT Stop Time: 954  OT Total Time (min): 32 min    Billable Minutes:Self Care/Home Management 10  Therapeutic Activity 22    JULIETTE Linder  3/13/2018

## 2018-03-13 NOTE — ASSESSMENT & PLAN NOTE
- detected at 370 on CMV PCR 2/1.  - case discussed with ID and will hold off on treatment at this time given low WBC.   - undetected CMV PCR 2/8.  - continue Valcyte 200 mg every Mon, Wed, Fri.  - CMV PCR 2/15 negative.  - Per ID, valcyte dose decreased to ppx dose on 2/18.  - CMV PCR 2/22 negative.   - CMV PCR 3/8 pending.  - Repeat scope 3/6 to assess persistent N/V and possible CMV causing these symptoms. Biopsies thus far normal, cmv stains pending.

## 2018-03-13 NOTE — ASSESSMENT & PLAN NOTE
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, CMV D+/R+, steroid induction, on maintenance tacro/MMF/pred; c/b seizures, LAURA, superificial wound infection s/p wound vac to [facility] through 1/12, and several recent admissions on 11/24 and 12/3 with culture negative peritonitis with peak ascites WBC ~5k due to suspected indolent biliary leakage s/p sphincterotomy/biliary stent placement over CBD stricture on 12/6 ERCP vs another IA source) who was admitted on 1/11/2017 with acute onset fevers and acute on chronic N/V/abdominal pain (RLQ worst) and was found to have a loculated RLQ collection on imaging (not safe for percutaneous drainage), for which, after failure with more conservative treatments, he ultimately underwent laparoscopic drainage on 2/7/2018 (with dark brown fluid drained, culture negative but after extensive antibiotic exposure) but without complete resolution. He achieved defervescence but had persistent N/V/abdominal pain on empiric ertapenem with persistent loculated fluid noted on imaging that is not accessible to percutaneous drainage. 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 His recent course over the past week was c/b AMS/neurologic symptoms seemingly due to a drug effect (as they have improved as of 3/13 following recent cessation of ertapenem  and CSA) and recrudescent fevers after transition off carbapenem that have finally stopped the last 48h of unknown etiology. He remains tenuous.     - would continue empiric vanc/cefepime/flagyl for now and observe GI symptoms  - additional prophylaxis per protocol (valcyte, atovaquone given bactrim allergy, and isavuconazole given complex course)  - await ulcer biopsy results   - will hold on LP for now -- if AMS/fevers recur, would pursue

## 2018-03-13 NOTE — PROGRESS NOTES
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, CMV D+/R+, steroid induction, on maintenance tacro/MMF/pred; c/b seizures, LAURA, superificial wound infection s/p wound vac to [facility] through 1/12, and several recent admissions on 11/24 and 12/3 with culture negative peritonitis with peak ascites WBC ~5k due to suspected indolent biliary leakage s/p sphincterotomy/biliary stent placement over CBD stricture on 12/6 ERCP vs another IA source) who was admitted on 1/11/2017 with acute onset fevers and acute on chronic N/V/abdominal pain (RLQ worst) and was found to have a loculated RLQ collection on imaging (not safe for percutaneous drainage), for which, after failure with more conservative treatments, he ultimately underwent laparoscopic drainage on 2/7/2018 (with dark brown fluid drained, culture negative but after extensive antibiotic exposure) but without complete resolution. He achieved defervescence but had persistent N/V/abdominal pain on empiric ertapenem with persistent loculated fluid noted on imaging that is not accessible to percutaneous drainage. 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 His recent course over the past week was c/b AMS/neurologic symptoms seemingly due to a drug effect (as they have improved as of 3/13 following recent cessation of ertapenem and CSA) and recrudescent fevers after transition off carbapenem that have finally stopped the last 48h of unknown etiology. He remains tenuous.     - would continue empiric vanc/cefepime/flagyl for now and observe GI symptoms  - additional prophylaxis per protocol (valcyte, atovaquone given bactrim allergy, and isavuconazole given complex course)  - await ulcer biopsy results   - will hold on LP for now -- if AMS/fevers recur, would pursue            Anticipated Disposition: pending improvement    Thank you for your consult. I will follow-up with patient. Please contact us if you have any additional questions.     Melanie Bergman MD  Transplant ID Attending  [phone]    Melanie Bergman MD  Infectious Disease  [facility]    Subjective:     Principal Problem:Fever    HPI: No notes on file  Interval History: Still afebrile and with normal mentation. Unfortunately nausea and abdominal pain have recurred.     Review of Systems   Constitutional: Negative for activity change, appetite change, chills, fatigue and fever.   HENT: Negative for congestion and facial swelling.    Eyes: Negative for pain, discharge and visual disturbance.   Respiratory: Negative for cough, chest tightness, shortness of breath and wheezing.    Cardiovascular: Negative for chest pain, palpitations and leg swelling.   Gastrointestinal: Positive for abdominal distention, abdominal pain and nausea. Negative for constipation and vomiting.   Endocrine: Negative.    Genitourinary:        Anuric   Musculoskeletal: Negative for arthralgias, back pain and myalgias.   Skin: Positive for wound. Negative for color change  and rash.   Allergic/Immunologic: Positive for immunocompromised state.   Neurological: Negative for dizziness, seizures, weakness and headaches.   Psychiatric/Behavioral: Negative for confusion, decreased concentration and dysphoric mood. The patient is not nervous/anxious.    All other systems reviewed and are negative.      Objective:     Vital Signs (Most Recent):  Temp: 99.2 °F (37.3 °C) (02/17/18 1542)  Pulse: 90 (02/17/18 1542)  Resp: 16 (02/17/18 1542)  BP: 135/85 (02/17/18 1542)  SpO2: 95 % (02/17/18 1542) Vital Signs (24h Range):  Temp:  [98.6 °F (37 °C)-101.2 °F (38.4 °C)] 99.2 °F (37.3 °C)  Pulse:  [84-92] 90  Resp:  [16-20] 16  SpO2:  [92 %-96 %] 95 %  BP: (128-137)/(75-85) 135/85     Weight: 60.2 kg (132 lb 11.5 oz)  Body mass index is 21.42 kg/m².    Estimated Creatinine Clearance: 26.8 mL/min (A) (based on SCr of 3.5 mg/dL (H)).    Physical Exam   Constitutional: He is oriented to person, place, and time. He appears well-developed. No distress.   Temporal and distal extremity muscle wasting   HENT:   Head: Normocephalic and atraumatic.   Mouth/Throat: No oropharyngeal exudate.   dylan tube   Eyes: EOM are normal. Pupils are equal, round, and reactive to light. No scleral icterus.   Neck: Normal range of motion. Neck supple. No JVD present. No thyromegaly present.   Cardiovascular: Normal rate, regular rhythm, normal heart sounds and intact distal pulses.    No murmur heard.  Pulmonary/Chest: Effort normal. No respiratory distress. He has no wheezes. He exhibits no tenderness.   Diminished to RLL   Abdominal: Soft. Bowel sounds are normal. He exhibits ascites. He exhibits no distension. There is tenderness. There is no rebound and no guarding.   Dressing to chevron.  Wd vac removed 1/12/18  Dependent edema present R flank; surrounding erythema   Musculoskeletal: Normal range of motion. He exhibits no tenderness. Edema: 2+ LE edema.   Neurological: He is alert and oriented to person, place, and time. He  has normal reflexes.   Skin: Skin is warm and dry. He is not diaphoretic. No erythema.   Psychiatric: His mood appears not anxious.   Nursing note and vitals reviewed.      Significant Labs:   CBC:     Recent Labs  Lab 02/16/18  0436 02/17/18  0343   WBC 2.85* 2.23*   HGB 8.8* 7.8*   HCT 26.8* 23.9*    134*     CMP:     Recent Labs  Lab 02/16/18  0436 02/17/18  0343   * 137   K 4.0 3.8    105   CO2 23 23   GLU 84 81   BUN 23* 13   CREATININE 5.1* 3.5*   CALCIUM 9.5 8.8   PROT 5.6* 5.1*   ALBUMIN 2.0* 1.8*   BILITOT 2.2* 2.1*   ALKPHOS 125 111   AST 25 18   ALT <5* <5*   ANIONGAP 11 9   EGFRNONAA 14.2* 22.4*       Significant Imaging: I have reviewed all pertinent imaging results/findings within the past 24 hours.     CT CAP:   Post surgical changes prior hepatic transplant.  Percutaneous surgical drain with notable decrease in size of fluid collection in lower abdomen.  Small amount of fluid tracking elsewhere within the abdomen and pelvis.  Large volume right pleural fluid, increased in size since prior exam, with associated right lower lobe atelectasis.  Diffuse body wall edema.  Small pericardial effusion.    Microbiology:  1/11 blood cx: negative  1/11 pleural fluid cx: negative  1/11 ascites cx: negative  1/17 blood cx: negative  1/18 blood cx: negative  1/19 pleural fluid cx: NGTD  1/19 ascites cx: NGTD  2/1 RVP negative  2/7 OR cx: NGTD  2/11 blood cx: negative  2/15 blood cx: negative  2/16 pleural fluid cx: negative

## 2018-03-13 NOTE — SUBJECTIVE & OBJECTIVE
Interval History: Still afebrile and with normal mentation. Unfortunately nausea and abdominal pain have recurred.     Review of Systems   Constitutional: Negative for activity change, appetite change, chills, fatigue and fever.   HENT: Negative for congestion and facial swelling.    Eyes: Negative for pain, discharge and visual disturbance.   Respiratory: Negative for cough, chest tightness, shortness of breath and wheezing.    Cardiovascular: Negative for chest pain, palpitations and leg swelling.   Gastrointestinal: Positive for abdominal distention, abdominal pain and nausea. Negative for constipation and vomiting.   Endocrine: Negative.    Genitourinary:        Anuric   Musculoskeletal: Negative for arthralgias, back pain and myalgias.   Skin: Positive for wound. Negative for color change and rash.   Allergic/Immunologic: Positive for immunocompromised state.   Neurological: Negative for dizziness, seizures, weakness and headaches.   Psychiatric/Behavioral: Negative for confusion, decreased concentration and dysphoric mood. The patient is not nervous/anxious.    All other systems reviewed and are negative.      Objective:     Vital Signs (Most Recent):  Temp: 99.2 °F (37.3 °C) (02/17/18 1542)  Pulse: 90 (02/17/18 1542)  Resp: 16 (02/17/18 1542)  BP: 135/85 (02/17/18 1542)  SpO2: 95 % (02/17/18 1542) Vital Signs (24h Range):  Temp:  [98.6 °F (37 °C)-101.2 °F (38.4 °C)] 99.2 °F (37.3 °C)  Pulse:  [84-92] 90  Resp:  [16-20] 16  SpO2:  [92 %-96 %] 95 %  BP: (128-137)/(75-85) 135/85     Weight: 60.2 kg (132 lb 11.5 oz)  Body mass index is 21.42 kg/m².    Estimated Creatinine Clearance: 26.8 mL/min (A) (based on SCr of 3.5 mg/dL (H)).    Physical Exam   Constitutional: He is oriented to person, place, and time. He appears well-developed. No distress.   Temporal and distal extremity muscle wasting   HENT:   Head: Normocephalic and atraumatic.   Mouth/Throat: No oropharyngeal exudate.   dylan tube   Eyes: EOM are normal.  Pupils are equal, round, and reactive to light. No scleral icterus.   Neck: Normal range of motion. Neck supple. No JVD present. No thyromegaly present.   Cardiovascular: Normal rate, regular rhythm, normal heart sounds and intact distal pulses.    No murmur heard.  Pulmonary/Chest: Effort normal. No respiratory distress. He has no wheezes. He exhibits no tenderness.   Diminished to RLL   Abdominal: Soft. Bowel sounds are normal. He exhibits ascites. He exhibits no distension. There is tenderness. There is no rebound and no guarding.   Dressing to chevron.  Wd vac removed 1/12/18  Dependent edema present R flank; surrounding erythema   Musculoskeletal: Normal range of motion. He exhibits no tenderness. Edema: 2+ LE edema.   Neurological: He is alert and oriented to person, place, and time. He has normal reflexes.   Skin: Skin is warm and dry. He is not diaphoretic. No erythema.   Psychiatric: His mood appears not anxious.   Nursing note and vitals reviewed.      Significant Labs:   CBC:     Recent Labs  Lab 02/16/18  0436 02/17/18  0343   WBC 2.85* 2.23*   HGB 8.8* 7.8*   HCT 26.8* 23.9*    134*     CMP:     Recent Labs  Lab 02/16/18  0436 02/17/18  0343   * 137   K 4.0 3.8    105   CO2 23 23   GLU 84 81   BUN 23* 13   CREATININE 5.1* 3.5*   CALCIUM 9.5 8.8   PROT 5.6* 5.1*   ALBUMIN 2.0* 1.8*   BILITOT 2.2* 2.1*   ALKPHOS 125 111   AST 25 18   ALT <5* <5*   ANIONGAP 11 9   EGFRNONAA 14.2* 22.4*       Significant Imaging: I have reviewed all pertinent imaging results/findings within the past 24 hours.     CT CAP:   Post surgical changes prior hepatic transplant.  Percutaneous surgical drain with notable decrease in size of fluid collection in lower abdomen.  Small amount of fluid tracking elsewhere within the abdomen and pelvis.  Large volume right pleural fluid, increased in size since prior exam, with associated right lower lobe atelectasis.  Diffuse body wall edema.  Small pericardial  effusion.    Microbiology:  1/11 blood cx: negative  1/11 pleural fluid cx: negative  1/11 ascites cx: negative  1/17 blood cx: negative  1/18 blood cx: negative  1/19 pleural fluid cx: NGTD  1/19 ascites cx: NGTD  2/1 RVP negative  2/7 OR cx: NGTD  2/11 blood cx: negative  2/15 blood cx: negative  2/16 pleural fluid cx: negative

## 2018-03-14 PROBLEM — G93.40 ENCEPHALOPATHY: Status: RESOLVED | Noted: 2018-03-07 | Resolved: 2018-03-14

## 2018-03-14 LAB
ALBUMIN SERPL BCP-MCNC: 2 G/DL
ALP SERPL-CCNC: 97 U/L
ALT SERPL W/O P-5'-P-CCNC: <5 U/L
ANION GAP SERPL CALC-SCNC: 13 MMOL/L
ANISOCYTOSIS BLD QL SMEAR: SLIGHT
AST SERPL-CCNC: 25 U/L
BASOPHILS NFR BLD: 0 %
BILIRUB SERPL-MCNC: 1.6 MG/DL
BUN SERPL-MCNC: 76 MG/DL
CALCIUM SERPL-MCNC: 8.7 MG/DL
CHLORIDE SERPL-SCNC: 101 MMOL/L
CO2 SERPL-SCNC: 25 MMOL/L
CREAT SERPL-MCNC: 3.8 MG/DL
CYCLOSPORINE BLD LC/MS/MS-MCNC: 381 NG/ML
DACRYOCYTES BLD QL SMEAR: ABNORMAL
DIFFERENTIAL METHOD: ABNORMAL
EOSINOPHIL NFR BLD: 0 %
ERYTHROCYTE [DISTWIDTH] IN BLOOD BY AUTOMATED COUNT: 18.9 %
EST. GFR  (AFRICAN AMERICAN): 23.5 ML/MIN/1.73 M^2
EST. GFR  (NON AFRICAN AMERICAN): 20.3 ML/MIN/1.73 M^2
GLUCOSE SERPL-MCNC: 114 MG/DL
HCT VFR BLD AUTO: 24.7 %
HGB BLD-MCNC: 8.2 G/DL
IMM GRANULOCYTES # BLD AUTO: ABNORMAL K/UL
IMM GRANULOCYTES NFR BLD AUTO: ABNORMAL %
LYMPHOCYTES NFR BLD: 9 %
MAGNESIUM SERPL-MCNC: 2.7 MG/DL
MCH RBC QN AUTO: 31.4 PG
MCHC RBC AUTO-ENTMCNC: 33.2 G/DL
MCV RBC AUTO: 95 FL
METAMYELOCYTES NFR BLD MANUAL: 2 %
MONOCYTES NFR BLD: 7 %
NEUTROPHILS NFR BLD: 76 %
NEUTS BAND NFR BLD MANUAL: 6 %
NRBC BLD-RTO: 0 /100 WBC
PHOSPHATE SERPL-MCNC: 2.7 MG/DL
PLATELET # BLD AUTO: 174 K/UL
PLATELET BLD QL SMEAR: ABNORMAL
PMV BLD AUTO: 11.1 FL
POIKILOCYTOSIS BLD QL SMEAR: SLIGHT
POLYCHROMASIA BLD QL SMEAR: ABNORMAL
POTASSIUM SERPL-SCNC: 2.9 MMOL/L
PROT SERPL-MCNC: 5.8 G/DL
RBC # BLD AUTO: 2.61 M/UL
SCHISTOCYTES BLD QL SMEAR: PRESENT
SIROLIMUS BLD-MCNC: 8.6 NG/ML
SODIUM SERPL-SCNC: 139 MMOL/L
WBC # BLD AUTO: 7.44 K/UL

## 2018-03-14 PROCEDURE — 25000003 PHARM REV CODE 250: Performed by: NURSE PRACTITIONER

## 2018-03-14 PROCEDURE — S0030 INJECTION, METRONIDAZOLE: HCPCS | Performed by: NURSE PRACTITIONER

## 2018-03-14 PROCEDURE — 80195 ASSAY OF SIROLIMUS: CPT

## 2018-03-14 PROCEDURE — 94664 DEMO&/EVAL PT USE INHALER: CPT

## 2018-03-14 PROCEDURE — 90935 HEMODIALYSIS ONE EVALUATION: CPT

## 2018-03-14 PROCEDURE — 63600175 PHARM REV CODE 636 W HCPCS: Performed by: NURSE PRACTITIONER

## 2018-03-14 PROCEDURE — 25000003 PHARM REV CODE 250: Performed by: SURGERY

## 2018-03-14 PROCEDURE — 63600175 PHARM REV CODE 636 W HCPCS: Mod: JG | Performed by: SURGERY

## 2018-03-14 PROCEDURE — 20600001 HC STEP DOWN PRIVATE ROOM

## 2018-03-14 PROCEDURE — 99900035 HC TECH TIME PER 15 MIN (STAT)

## 2018-03-14 PROCEDURE — 25000003 PHARM REV CODE 250: Performed by: PHYSICIAN ASSISTANT

## 2018-03-14 PROCEDURE — 83735 ASSAY OF MAGNESIUM: CPT

## 2018-03-14 PROCEDURE — 85027 COMPLETE CBC AUTOMATED: CPT

## 2018-03-14 PROCEDURE — 85007 BL SMEAR W/DIFF WBC COUNT: CPT

## 2018-03-14 PROCEDURE — 80158 DRUG ASSAY CYCLOSPORINE: CPT

## 2018-03-14 PROCEDURE — A4217 STERILE WATER/SALINE, 500 ML: HCPCS | Performed by: PHYSICIAN ASSISTANT

## 2018-03-14 PROCEDURE — 99233 SBSQ HOSP IP/OBS HIGH 50: CPT | Mod: ,,, | Performed by: PHYSICIAN ASSISTANT

## 2018-03-14 PROCEDURE — 80053 COMPREHEN METABOLIC PANEL: CPT

## 2018-03-14 PROCEDURE — 90935 HEMODIALYSIS ONE EVALUATION: CPT | Mod: ,,, | Performed by: INTERNAL MEDICINE

## 2018-03-14 PROCEDURE — B4185 PARENTERAL SOL 10 GM LIPIDS: HCPCS | Performed by: PHYSICIAN ASSISTANT

## 2018-03-14 PROCEDURE — 63600175 PHARM REV CODE 636 W HCPCS: Performed by: PHYSICIAN ASSISTANT

## 2018-03-14 PROCEDURE — 63600175 PHARM REV CODE 636 W HCPCS: Mod: JG | Performed by: NURSE PRACTITIONER

## 2018-03-14 PROCEDURE — 84100 ASSAY OF PHOSPHORUS: CPT

## 2018-03-14 PROCEDURE — 99233 SBSQ HOSP IP/OBS HIGH 50: CPT | Mod: ,,, | Performed by: INTERNAL MEDICINE

## 2018-03-14 RX ORDER — SODIUM CHLORIDE 9 MG/ML
INJECTION, SOLUTION INTRAVENOUS ONCE
Status: COMPLETED | OUTPATIENT
Start: 2018-03-14 | End: 2018-03-14

## 2018-03-14 RX ORDER — SODIUM CHLORIDE 9 MG/ML
INJECTION, SOLUTION INTRAVENOUS
Status: DISCONTINUED | OUTPATIENT
Start: 2018-03-14 | End: 2018-03-18

## 2018-03-14 RX ADMIN — HEPARIN SODIUM 5000 UNITS: 5000 INJECTION, SOLUTION INTRAVENOUS; SUBCUTANEOUS at 10:03

## 2018-03-14 RX ADMIN — SODIUM CHLORIDE: 9 INJECTION, SOLUTION INTRAVENOUS at 12:03

## 2018-03-14 RX ADMIN — HEPARIN SODIUM 5000 UNITS: 5000 INJECTION, SOLUTION INTRAVENOUS; SUBCUTANEOUS at 02:03

## 2018-03-14 RX ADMIN — CYCLOSPORINE 125 MG: 100 CAPSULE, LIQUID FILLED ORAL at 05:03

## 2018-03-14 RX ADMIN — LEVOTHYROXINE SODIUM 75 MCG: 75 TABLET ORAL at 05:03

## 2018-03-14 RX ADMIN — URSODIOL 300 MG: 300 CAPSULE ORAL at 10:03

## 2018-03-14 RX ADMIN — HEPARIN SODIUM 1000 UNITS: 1000 INJECTION, SOLUTION INTRAVENOUS; SUBCUTANEOUS at 12:03

## 2018-03-14 RX ADMIN — ERYTHROPOIETIN 10000 UNITS: 10000 INJECTION, SOLUTION INTRAVENOUS; SUBCUTANEOUS at 12:03

## 2018-03-14 RX ADMIN — BISACODYL 10 MG: 5 TABLET, COATED ORAL at 02:03

## 2018-03-14 RX ADMIN — DOCUSATE SODIUM 100 MG: 100 CAPSULE, LIQUID FILLED ORAL at 05:03

## 2018-03-14 RX ADMIN — METRONIDAZOLE 500 MG: 500 INJECTION, SOLUTION INTRAVENOUS at 02:03

## 2018-03-14 RX ADMIN — OXYCODONE HYDROCHLORIDE 5 MG: 5 TABLET ORAL at 06:03

## 2018-03-14 RX ADMIN — CEFEPIME 1 G: 1 INJECTION, POWDER, FOR SOLUTION INTRAMUSCULAR; INTRAVENOUS at 05:03

## 2018-03-14 RX ADMIN — METRONIDAZOLE 500 MG: 500 INJECTION, SOLUTION INTRAVENOUS at 05:03

## 2018-03-14 RX ADMIN — HYDROCORTISONE SODIUM SUCCINATE 50 MG: 100 INJECTION, POWDER, FOR SOLUTION INTRAMUSCULAR; INTRAVENOUS at 10:03

## 2018-03-14 RX ADMIN — SODIUM CHLORIDE: 0.9 INJECTION, SOLUTION INTRAVENOUS at 08:03

## 2018-03-14 RX ADMIN — RETINOL, ERGOCALCIFEROL, .ALPHA.-TOCOPHEROL ACETATE, DL-, PHYTONADIONE, ASCORBIC ACID, NIACINAMIDE, RIBOFLAVIN 5-PHOSPHATE SODIUM, THIAMINE HYDROCHLORIDE, PYRIDOXINE HYDROCHLORIDE, DEXPANTHENOL, BIOTIN, FOLIC ACID, AND CYANOCOBALAMIN: KIT at 10:03

## 2018-03-14 RX ADMIN — LEVETIRACETAM 500 MG: 100 SOLUTION ORAL at 10:03

## 2018-03-14 RX ADMIN — OXYCODONE HYDROCHLORIDE 5 MG: 5 TABLET ORAL at 09:03

## 2018-03-14 RX ADMIN — HYDROCORTISONE SODIUM SUCCINATE 50 MG: 100 INJECTION, POWDER, FOR SOLUTION INTRAMUSCULAR; INTRAVENOUS at 02:03

## 2018-03-14 RX ADMIN — CYCLOSPORINE 125 MG: 100 CAPSULE, LIQUID FILLED ORAL at 06:03

## 2018-03-14 RX ADMIN — OXYCODONE HYDROCHLORIDE 5 MG: 5 TABLET ORAL at 03:03

## 2018-03-14 RX ADMIN — URSODIOL 300 MG: 300 CAPSULE ORAL at 05:03

## 2018-03-14 RX ADMIN — METRONIDAZOLE 500 MG: 500 INJECTION, SOLUTION INTRAVENOUS at 10:03

## 2018-03-14 RX ADMIN — OXYCODONE HYDROCHLORIDE 5 MG: 5 TABLET ORAL at 10:03

## 2018-03-14 RX ADMIN — HEPARIN SODIUM 5000 UNITS: 5000 INJECTION, SOLUTION INTRAVENOUS; SUBCUTANEOUS at 05:03

## 2018-03-14 RX ADMIN — OMEPRAZOLE 40 MG: 40 CAPSULE, DELAYED RELEASE ORAL at 07:03

## 2018-03-14 RX ADMIN — SOYBEAN OIL 250 ML: 20 INJECTION, SOLUTION INTRAVENOUS at 10:03

## 2018-03-14 RX ADMIN — LEVETIRACETAM 500 MG: 100 SOLUTION ORAL at 02:03

## 2018-03-14 RX ADMIN — HYDROCORTISONE SODIUM SUCCINATE 50 MG: 100 INJECTION, POWDER, FOR SOLUTION INTRAMUSCULAR; INTRAVENOUS at 05:03

## 2018-03-14 RX ADMIN — CEFEPIME 1 G: 1 INJECTION, POWDER, FOR SOLUTION INTRAMUSCULAR; INTRAVENOUS at 06:03

## 2018-03-14 RX ADMIN — DOCUSATE SODIUM 100 MG: 100 CAPSULE, LIQUID FILLED ORAL at 10:03

## 2018-03-14 RX ADMIN — OXYCODONE HYDROCHLORIDE 5 MG: 5 TABLET ORAL at 01:03

## 2018-03-14 NOTE — ASSESSMENT & PLAN NOTE
- Post op course complicated by fevers and hyperbilirubinemia.  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  - Liver US 1/8 showed 3.9 cm complex fluid collection anterior to right lobe and moderate nonspecific complex ascites inferior to transplant.   - IR placed drain 1/11/18, cell count negative for infection.  - PBS consulted. ERCP 1/17 with stone and sludge. Pt placed on Actigall - treated with 10 day course of abx.   - Bili elevated - liver u/s 1/28.  - Liver biopsy 1/23 without rejection.  - consulted PBS for ERCP - Choledocholithiasis was found along with sludge, stents placed 1/30.   - ERCP 2/19, overall unremarkable findings. Did place new larger stents. Will need repeat ERCP in 10 weeks. Bilirubin improving.  - Liver US reviewed w mod ascites.  Stent in CBD.    - Paracentesis 3/6 negative for infection, wbc 28 segs 3%.  - ERCP, EUS biopsy 3/9/18 - no rejection, mild cholestatic hepatitis seen.

## 2018-03-14 NOTE — ASSESSMENT & PLAN NOTE
- Tbili with increase prompting ERCP 1/30 with stones and sludge, stents placed.  - Repeat ERCP 2/19 as was having recurrent fevers with bilirubin stuck at 2.0-2.2 with overall unremarkable findings seen. Did place new larger stents.   - US 3/2 stent in CBD  - ERCP/EUS 3/9 with mental stent placed in bile duct, stones removed

## 2018-03-14 NOTE — PROGRESS NOTES
Pt arrived via stretcher.  Placed on cardiac monitor and b/p check every 15 minutes. Right chest wall   Dialysis access prep with prevantic swab.  maintenace  Hemodialysis started per orders.

## 2018-03-14 NOTE — PLAN OF CARE
Problem: Patient Care Overview  Goal: Plan of Care Review  Outcome: Ongoing (interventions implemented as appropriate)  Pt AAO x 4. Pt went to HD today. Tolerated well. Pt ran for 4 hours 4 L removed. Pt had one unmeasured urine occurrence today. Pt was able to eat lunch today.   Pt complaining of pain to right lower quadrant.   Antifungals and antibiotics given. Pt in better spirits today and anxious to go home.   Pt free of falls and injury. Pt wearing nonskid socks/shoes when out of bed, bed in lowest position, side rails up x 2, call light within reach.

## 2018-03-14 NOTE — ASSESSMENT & PLAN NOTE
- CXR in ER with large right pleural effusion with subjective c/o worsening SOB.  - thoracentesis completed- 1200 ml removed.  SOB with significant improvement.   - Cell count reviewed and negative for infection.   - resp even and non labored.  O2 sat 94-98%.  - Repeat thora 1/19 negative for infection per cell count.  - Reaccumulating fluid on xray 1/21.  - thoracentesis 1/31 - 1.4L removed, negative for infection.  - Reaccumulating fluid again on xray 2/10.   - Thoracentesis performed  2/16 with 1500 cc off, cell count neg for infection, cx no growth to date.   - CT A/P 2/26 with large R pleural effusion, may need repeat thora if fevers reoccur.    - complaining of sob, obtain chest xray, trial dose of diamox 3/14 with minimal UOP  - chest xray with large pleural effusion --> may need thora soon  - HD today

## 2018-03-14 NOTE — PLAN OF CARE
Problem: Spiritual Distress, Risk/Actual (Adult,Obstetrics,Pediatric)  Intervention: Facilitate Personal Exploration/Expression of Spirituality  Provided follow-up visit. Pt and pt's mom present. Introduced and offered support with patient, as this was first time pt was alert for visit. Explained 's availability for patients regardless of their mikey background, even for those with none at all. Pt and mom thankful for visit. No spiritual needs expressed at this time. Pt and family made aware of 's presence as needed.

## 2018-03-14 NOTE — PROGRESS NOTES
Dialysis complete.  Blood returned.    Right chest wall  CVC flushed * 2 with      Saline and heaparin  Locked with cap and tape.  No s/s infection at cvc site.   Pt tolerated hemodialysis without diff.  Pt ran 4   Hrs on hemodialysis machine.   Took off 4000  Ml net volume.      Used F-160 dialyzer.

## 2018-03-14 NOTE — ASSESSMENT & PLAN NOTE
- Started on vanc/cefepime for ERCP. Now transitioned to vanc/zosyn.  - Blood cultures sent, prelim NGTD. Repeat blood cultures and chest xray today 2/10.  - thoracentesis 2/1 - fluid negative for infection.  - atelectasis noted on ct scan - start breathing tx & CPT.  - now cmv positive - started treatment 2/5/18.  - taken to OR 2/7 for exp lap - fluid collections drained, cultures pending.  - Patient with low grade fever 2/15, 100.3.  - CT A/P 2/15 with no evidence of IA fluid infection. Did show R pleural effusion s/p thoracentesis, cell count negative for infection, cx pending.  - Repeat blood cx 2/15 prelim NGTD, CMV PCR 2/15 negative   Complaining of abdominal pain. Is tender in RUQ.  - Remains with fevers.  - Bilirubin remains elevated. Concerning for cholangitis. Antibiotics broadened 2/17. ID re consulted.   - ERCP  2/19, overall unremarkable findings. Did place new larger stents. Will need repeat ERCP in 10 weeks. Bilirubin is stable post ERCP.  -Tmax 101 2/21 PM again ID following. Plan to transition vanc/zosyn to ertapenem 2/22 as no improvement noted.   - Erta started 2/22.  - CT A/P obtained 2/26 with focal/loculated region of fluid in the lower right abdomen measuring approximately 8.7 x 5.5 cm.   - IR attempted drainage on fluid collection on 2/27, however, per IR,there was limited fluid interposed with bowel in the RLQ with no safe pocket seen for drainage.  -Tagged WBC scan 2/27, negative results.  - Remains with low grade fevers.   - Per transplant attending, will hold off on EGD at this time as concern anesthesia will put patient at further increase risk for seizure activity. Will plan for potenial EGD on Monday. Discussed with GI, appreciate their assistance.  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 EBV PCR negative. Adenovirus pending. Repeat CMV PCR negative.    - Back pain and abdominal pain persists. Last BM 3/5 with brown bomb.   - ID following. Cont Ertapenem dc'd 3/7 - due to  AMS   - Tmax 101.1 overnight 3/9 - Cefepime/vanc/flagyl.  Monitor.   - Remains afebrile x several days  - Bx with cholestatic hepatitis --> couldn't cx secondary to EUS contamination --> will cx next bx

## 2018-03-14 NOTE — PLAN OF CARE
Problem: Patient Care Overview  Goal: Plan of Care Review  Outcome: Ongoing (interventions implemented as appropriate)  AAOx3, afebrile, c/o pain. Pain controlled by prn pain medication. Zofran IV given x1. Pt on 1.5 L of O2 during the night (>95%). Pt had large BM after enema yesterday. TPN/ lipids administered.  Antifungals and antibiotics given.  Plan for HD today. Pt in lowest position, side rails up x2, non-skid foot wear in place, call light within reach, pt verbalized understanding to call RN when needed. Pt x1 person assist OOB. Hand hygiene practiced per protocol. Will continue to monitor.

## 2018-03-14 NOTE — ASSESSMENT & PLAN NOTE
- HD resumed on previous admission. Now anuric and dialyzes M-W-F.  - Nephrology following.    - Goal is for kidney txp in future- currently not a candidate due to health status.

## 2018-03-14 NOTE — SUBJECTIVE & OBJECTIVE
Interval History: No acute change today in neuro status. Afebrile.     Review of Systems   Constitutional: Negative for activity change, appetite change, chills, fatigue and fever.   HENT: Negative for congestion and facial swelling.    Eyes: Negative for pain, discharge and visual disturbance.   Respiratory: Negative for cough, chest tightness, shortness of breath and wheezing.    Cardiovascular: Negative for chest pain, palpitations and leg swelling.   Gastrointestinal: Positive for abdominal distention, abdominal pain and nausea. Negative for constipation and vomiting.   Endocrine: Negative.    Genitourinary:        Anuric   Musculoskeletal: Negative for arthralgias, back pain and myalgias.   Skin: Positive for wound. Negative for color change and rash.   Allergic/Immunologic: Positive for immunocompromised state.   Neurological: Negative for dizziness, seizures, weakness and headaches.   Psychiatric/Behavioral: Negative for confusion, decreased concentration and dysphoric mood. The patient is not nervous/anxious.    All other systems reviewed and are negative.      Objective:     Vital Signs (Most Recent):  Temp: 99.2 °F (37.3 °C) (02/17/18 1542)  Pulse: 90 (02/17/18 1542)  Resp: 16 (02/17/18 1542)  BP: 135/85 (02/17/18 1542)  SpO2: 95 % (02/17/18 1542) Vital Signs (24h Range):  Temp:  [98.6 °F (37 °C)-101.2 °F (38.4 °C)] 99.2 °F (37.3 °C)  Pulse:  [84-92] 90  Resp:  [16-20] 16  SpO2:  [92 %-96 %] 95 %  BP: (128-137)/(75-85) 135/85     Weight: 60.2 kg (132 lb 11.5 oz)  Body mass index is 21.42 kg/m².    Estimated Creatinine Clearance: 26.8 mL/min (A) (based on SCr of 3.5 mg/dL (H)).    Physical Exam   Constitutional: He is oriented to person, place, and time. He appears well-developed. No distress.   Temporal and distal extremity muscle wasting   HENT:   Head: Normocephalic and atraumatic.   Mouth/Throat: No oropharyngeal exudate.   dylan tube   Eyes: EOM are normal. Pupils are equal, round, and reactive to light.  No scleral icterus.   Neck: Normal range of motion. Neck supple. No JVD present. No thyromegaly present.   Cardiovascular: Normal rate, regular rhythm, normal heart sounds and intact distal pulses.    No murmur heard.  Pulmonary/Chest: Effort normal. No respiratory distress. He has no wheezes. He exhibits no tenderness.   Diminished to RLL   Abdominal: Soft. Bowel sounds are normal. He exhibits ascites. He exhibits no distension. There is tenderness. There is no rebound and no guarding.   Dressing to chevron.  Wd vac removed 1/12/18  Dependent edema present R flank; surrounding erythema   Musculoskeletal: Normal range of motion. He exhibits no tenderness. Edema: 2+ LE edema.   Neurological: He is alert and oriented to person, place, and time. He has normal reflexes.   Skin: Skin is warm and dry. He is not diaphoretic. No erythema.   Psychiatric: His mood appears not anxious.   Nursing note and vitals reviewed.      Significant Labs:   CBC:     Recent Labs  Lab 02/16/18  0436 02/17/18  0343   WBC 2.85* 2.23*   HGB 8.8* 7.8*   HCT 26.8* 23.9*    134*     CMP:     Recent Labs  Lab 02/16/18  0436 02/17/18  0343   * 137   K 4.0 3.8    105   CO2 23 23   GLU 84 81   BUN 23* 13   CREATININE 5.1* 3.5*   CALCIUM 9.5 8.8   PROT 5.6* 5.1*   ALBUMIN 2.0* 1.8*   BILITOT 2.2* 2.1*   ALKPHOS 125 111   AST 25 18   ALT <5* <5*   ANIONGAP 11 9   EGFRNONAA 14.2* 22.4*       Significant Imaging: I have reviewed all pertinent imaging results/findings within the past 24 hours.     CT CAP:   Post surgical changes prior hepatic transplant.  Percutaneous surgical drain with notable decrease in size of fluid collection in lower abdomen.  Small amount of fluid tracking elsewhere within the abdomen and pelvis.  Large volume right pleural fluid, increased in size since prior exam, with associated right lower lobe atelectasis.  Diffuse body wall edema.  Small pericardial effusion.    Microbiology:  1/11 blood cx: negative  1/11  pleural fluid cx: negative  1/11 ascites cx: negative  1/17 blood cx: negative  1/18 blood cx: negative  1/19 pleural fluid cx: NGTD  1/19 ascites cx: NGTD  2/1 RVP negative  2/7 OR cx: NGTD  2/11 blood cx: negative  2/15 blood cx: negative  2/16 pleural fluid cx: negative

## 2018-03-14 NOTE — ASSESSMENT & PLAN NOTE
- Patient with stuttering speech, trouble recalling specific words when speaking. Does have hx of seizure (on keppra) previously while on prograf. Switched to prograf over the weekend from cyclosporine. Neuro exam unremarkable.   - CT head without contrast obtained, unremarkable.   - In light of these symptoms, prograf d/c, restart cyclosporine 3/1.  - remains w/o seizure. Cont to have stutter/garbled speech/difficulty finding words.  Worse post EGD as sedation was given.  - MRI 3/6 unremarkable.  - EEG completed 3/8 - mild, generalized, non-specific cerebral dysfunction, no epileptiform activity  - monitor closely. VSS.

## 2018-03-14 NOTE — PROGRESS NOTES
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, CMV D+/R+, steroid induction, on maintenance tacro/MMF/pred; c/b seizures, LAURA, superificial wound infection s/p wound vac to [facility] through 1/12, and several recent admissions on 11/24 and 12/3 with culture negative peritonitis with peak ascites WBC ~5k due to suspected indolent biliary leakage s/p sphincterotomy/biliary stent placement over CBD stricture on 12/6 ERCP vs another IA source) who was admitted on 1/11/2017 with acute onset fevers and acute on chronic N/V/abdominal pain (RLQ worst) and was found to have a loculated RLQ collection on imaging (not safe for percutaneous drainage), for which, after failure with more conservative treatments, he ultimately underwent laparoscopic drainage on 2/7/2018 (with dark brown fluid drained, culture negative but after extensive antibiotic exposure) but without complete resolution. He achieved defervescence but had persistent N/V/abdominal pain on empiric ertapenem with persistent loculated fluid noted on imaging that is not accessible to percutaneous drainage. 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 His recent course over the past week was c/b AMS/neurologic symptoms seemingly due to a drug effect (as they have improved as of 3/13 following recent cessation of ertapenem and CSA) and recrudescent fevers after transition off carbapenem that have finally stopped the last 48h of unknown etiology. He remains tenuous.     - would continue empiric vanc/cefepime/flagyl for now and observe GI symptoms  - additional prophylaxis per protocol (valcyte, atovaquone given bactrim allergy, and isavuconazole given complex course)  - await ulcer biopsy results   - will hold on LP for now -- if AMS/fevers recur, would pursue            Anticipated Disposition: pending improvement    Thank you for your consult. I will follow-up with patient. Please contact us if you have any additional questions.     Melanie Bergman MD  Transplant ID Attending  [id number]    Melanie Bergman MD  Infectious Disease  [facility]    Subjective:     Principal Problem:Fever    HPI: No notes on file  Interval History: No acute change today in neuro status. Afebrile.     Review of Systems   Constitutional: Negative for activity change, appetite change, chills, fatigue and fever.   HENT: Negative for congestion and facial swelling.    Eyes: Negative for pain, discharge and visual disturbance.   Respiratory: Negative for cough, chest tightness, shortness of breath and wheezing.    Cardiovascular: Negative for chest pain, palpitations and leg swelling.   Gastrointestinal: Positive for abdominal distention, abdominal pain and nausea. Negative for constipation and vomiting.   Endocrine: Negative.    Genitourinary:        Anuric   Musculoskeletal: Negative for arthralgias, back pain and myalgias.   Skin: Positive for wound. Negative for color change and rash.   Allergic/Immunologic: Positive for  immunocompromised state.   Neurological: Negative for dizziness, seizures, weakness and headaches.   Psychiatric/Behavioral: Negative for confusion, decreased concentration and dysphoric mood. The patient is not nervous/anxious.    All other systems reviewed and are negative.      Objective:     Vital Signs (Most Recent):  Temp: 99.2 °F (37.3 °C) (02/17/18 1542)  Pulse: 90 (02/17/18 1542)  Resp: 16 (02/17/18 1542)  BP: 135/85 (02/17/18 1542)  SpO2: 95 % (02/17/18 1542) Vital Signs (24h Range):  Temp:  [98.6 °F (37 °C)-101.2 °F (38.4 °C)] 99.2 °F (37.3 °C)  Pulse:  [84-92] 90  Resp:  [16-20] 16  SpO2:  [92 %-96 %] 95 %  BP: (128-137)/(75-85) 135/85     Weight: 60.2 kg (132 lb 11.5 oz)  Body mass index is 21.42 kg/m².    Estimated Creatinine Clearance: 26.8 mL/min (A) (based on SCr of 3.5 mg/dL (H)).    Physical Exam   Constitutional: He is oriented to person, place, and time. He appears well-developed. No distress.   Temporal and distal extremity muscle wasting   HENT:   Head: Normocephalic and atraumatic.   Mouth/Throat: No oropharyngeal exudate.   dylan tube   Eyes: EOM are normal. Pupils are equal, round, and reactive to light. No scleral icterus.   Neck: Normal range of motion. Neck supple. No JVD present. No thyromegaly present.   Cardiovascular: Normal rate, regular rhythm, normal heart sounds and intact distal pulses.    No murmur heard.  Pulmonary/Chest: Effort normal. No respiratory distress. He has no wheezes. He exhibits no tenderness.   Diminished to RLL   Abdominal: Soft. Bowel sounds are normal. He exhibits ascites. He exhibits no distension. There is tenderness. There is no rebound and no guarding.   Dressing to chevron.  Wd vac removed 1/12/18  Dependent edema present R flank; surrounding erythema   Musculoskeletal: Normal range of motion. He exhibits no tenderness. Edema: 2+ LE edema.   Neurological: He is alert and oriented to person, place, and time. He has normal reflexes.   Skin: Skin is warm and  dry. He is not diaphoretic. No erythema.   Psychiatric: His mood appears not anxious.   Nursing note and vitals reviewed.      Significant Labs:   CBC:     Recent Labs  Lab 02/16/18  0436 02/17/18  0343   WBC 2.85* 2.23*   HGB 8.8* 7.8*   HCT 26.8* 23.9*    134*     CMP:     Recent Labs  Lab 02/16/18  0436 02/17/18  0343   * 137   K 4.0 3.8    105   CO2 23 23   GLU 84 81   BUN 23* 13   CREATININE 5.1* 3.5*   CALCIUM 9.5 8.8   PROT 5.6* 5.1*   ALBUMIN 2.0* 1.8*   BILITOT 2.2* 2.1*   ALKPHOS 125 111   AST 25 18   ALT <5* <5*   ANIONGAP 11 9   EGFRNONAA 14.2* 22.4*       Significant Imaging: I have reviewed all pertinent imaging results/findings within the past 24 hours.     CT CAP:   Post surgical changes prior hepatic transplant.  Percutaneous surgical drain with notable decrease in size of fluid collection in lower abdomen.  Small amount of fluid tracking elsewhere within the abdomen and pelvis.  Large volume right pleural fluid, increased in size since prior exam, with associated right lower lobe atelectasis.  Diffuse body wall edema.  Small pericardial effusion.    Microbiology:  1/11 blood cx: negative  1/11 pleural fluid cx: negative  1/11 ascites cx: negative  1/17 blood cx: negative  1/18 blood cx: negative  1/19 pleural fluid cx: NGTD  1/19 ascites cx: NGTD  2/1 RVP negative  2/7 OR cx: NGTD  2/11 blood cx: negative  2/15 blood cx: negative  2/16 pleural fluid cx: negative

## 2018-03-14 NOTE — PROGRESS NOTES
Update:  EZEKIEL met with pt and pt's mother Sanjuanita Diana in pt's room in order to provide continuity of care and resources. Pt was AAOx4, sitting in his wheelchair by the door as he had just returned from HD. Pt's mother was sitting by the window and both pt and mother expressed that they are coping well this afternoon especially as pt has been feeling much better. Pt and mother aware that pt set up for outpatient dialysis at [facility] with potential chair schedule as MWF 3:30PM. As of now, pt's discharge pending, so SW unable to state when pt's first chair time will occur. Pt and pt's mother expressed understanding. Pt's mother reports that pt's employer sent him a form in February in order to get an update on pt's medical status. Pt's mother states that mother asked pt's RN coordinator Isabel Gilliam to fill this out. Pt's mother states that she just found a voice message on pt's phone from Isabel inquiring about what the form was about from 2/19. Pt's mother states that pt's employer at [employer] still has not received this form or any medical updates. SW inquired with Isabel who reports that she does not fill out these forms and gives them to her RN supervisor Tammie Odom as pt is currently an inpatient. SW will follow up with Tammie tomorrow as she is out today. Pt's mother notified of this. SW also asked that mother have pt's employer email another form in the event that the form was lost. Pt's mother given EZEKIEL's email and will follow up with pt's employer on this. At this time, pt and mother had no further questions or concerns. EZEKIEL notified Geronimo Vera, pt's JUANCARLOS that pt has a tentative chair schedule at [facility]. SW remains available.

## 2018-03-14 NOTE — ASSESSMENT & PLAN NOTE
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+, steroid induction, on maintenance tacro/MMF/pred; c/b seizures, LAURA, superificial wound infection s/p wound vac to [facility] through 1/12, and several recent admissions on 11/24 and 12/3 with culture negative peritonitis with peak ascites WBC ~5k due to suspected indolent biliary leakage s/p sphincterotomy/biliary stent placement over CBD stricture on 12/6 ERCP vs another IA source) who was admitted on 1/11/2017 with acute onset fevers and acute on chronic N/V/abdominal pain (RLQ worst) and was found to have a loculated RLQ collection on imaging (not safe for percutaneous drainage), for which, after failure with more conservative treatments, he ultimately underwent laparoscopic drainage on 2/7/2018 (with dark brown fluid drained, culture negative but after extensive antibiotic exposure) but without complete resolution. He achieved defervescence but had persistent N/V/abdominal pain on empiric ertapenem with persistent loculated fluid noted on imaging that is not accessible to percutaneous drainage. 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 His recent course over the past week was c/b AMS/neurologic symptoms seemingly due to a drug effect (as they have improved as of 3/13 following recent cessation of ertapenem  and CSA) and recrudescent fevers after transition off carbapenem that have finally stopped the last 48h of unknown etiology. He remains tenuous.     - would continue empiric vanc/cefepime/flagyl for now and observe GI symptoms  - additional prophylaxis per protocol (valcyte, atovaquone given bactrim allergy, and isavuconazole given complex course)  - await ulcer biopsy results   - will hold on LP for now -- if AMS/fevers recur, would pursue

## 2018-03-14 NOTE — SUBJECTIVE & OBJECTIVE
Scheduled Meds:   sodium chloride 0.9%   Intravenous Once    bisacodyl  10 mg Oral Daily    ceFEPime (MAXIPIME) IVPB  1 g Intravenous Q12H    cycloSPORINE modified  125 mg Oral BID    docusate sodium  100 mg Oral BID    epoetin maurisio (PROCRIT) injection  10,000 Units Intravenous Every Mon, Wed, Fri    ergocalciferol  50,000 Units Oral Q7 Days    fat emulsion 20%  250 mL Intravenous Once    heparin (porcine)  5,000 Units Subcutaneous Q8H    hydrocortisone sodium succinate  50 mg Intravenous Q8H    custom IVPB builder   Intravenous Q24H    levetiracetam oral soln  500 mg Oral BID    levothyroxine  75 mcg Oral Before breakfast    metronidazole  500 mg Intravenous Q8H    multivitamin  1 tablet Oral Daily    omeprazole  40 mg Oral QAM    polyethylene glycol  17 g Oral BID    psyllium husk (aspartame)  3.4 g Oral BID    ursodiol  300 mg Oral BID     Continuous Infusions:   TPN ADULT CENTRAL LINE CUSTOM 48 mL/hr at 03/13/18 2218    TPN ADULT CENTRAL LINE CUSTOM       PRN Meds:sodium chloride, sodium chloride, sodium chloride 0.9%, sodium chloride 0.9%, sodium chloride 0.9%, acetaminophen, albuterol-ipratropium 2.5mg-0.5mg/3mL, bisacodyl, dextrose 50%, dextrose 50%, diphenhydrAMINE-zinc acetate 1-0.1%, glucagon (human recombinant), glucose, glucose, heparin (porcine), naloxone, ondansetron, ondansetron, oxyCODONE, oxyCODONE, prochlorperazine, simethicone, sodium chloride 0.9%, sodium chloride 0.9%    Review of Systems   Constitutional: Positive for activity change, appetite change and fatigue.   Gastrointestinal: Positive for abdominal distention, abdominal pain and constipation.   Genitourinary: Positive for decreased urine volume.   Allergic/Immunologic: Positive for immunocompromised state.     Objective:     Vital Signs (Most Recent):  Temp: 98.6 °F (37 °C) (03/14/18 0815)  Pulse: 83 (03/14/18 1000)  Resp: 16 (03/14/18 1000)  BP: 133/78 (03/14/18 1000)  SpO2: 98 % (03/14/18 0326) Vital Signs (24h  Range):  Temp:  [98.5 °F (36.9 °C)-99 °F (37.2 °C)] 98.6 °F (37 °C)  Pulse:  [75-86] 83  Resp:  [16-20] 16  SpO2:  [95 %-99 %] 98 %  BP: (133-149)/(73-87) 133/78     Weight: 74 kg (163 lb 2.3 oz)  Body mass index is 26.33 kg/m².    Intake/Output - Last 3 Shifts       03/12 0700 - 03/13 0659 03/13 0700 - 03/14 0659 03/14 0700 - 03/15 0659    P.O. 600 660     I.V. (mL/kg)  0 (0)     Blood 350      Other 600 0     IV Piggyback 400 250     TPN 1707.6 462.4     Total Intake(mL/kg) 3657.6 (49.3) 1372.4 (18.5)     Urine (mL/kg/hr) 250 (0.1) 125 (0.1)     Emesis/NG output  0 (0)     Other 3600 (2) 0 (0)     Stool 0 (0) 0 (0)     Blood  0 (0)     Total Output 3850 125      Net -192.4 +1247.4             Urine Occurrence  0 x     Stool Occurrence 1 x 1 x     Emesis Occurrence  0 x           Physical Exam   Constitutional: He is oriented to person, place, and time. He appears well-developed.   Hand and temporal muscle wasting   HENT:   Head: Normocephalic.   Eyes: Pupils are equal, round, and reactive to light. Scleral icterus is present.   Cardiovascular: Normal rate, regular rhythm, normal heart sounds and intact distal pulses.    Pulmonary/Chest: Effort normal. He has rales.   L base, decreased BS R base   Abdominal: Bowel sounds are normal. He exhibits distension. There is no tenderness.   Musculoskeletal: Normal range of motion. He exhibits edema.   Neurological: He is alert and oriented to person, place, and time. GCS eye subscore is 4. GCS verbal subscore is 5. GCS motor subscore is 6.   Skin: Skin is warm and dry.   Psychiatric: He has a normal mood and affect. His behavior is normal. Judgment and thought content normal.   Nursing note and vitals reviewed.      Laboratory:  Immunosuppressants         Stop Route Frequency     cycloSPORINE modified capsule 125 mg      -- Oral 2 times daily     cycloSPORINE modified (NEORAL) 25 MG capsule      03/09 1714       cycloSPORINE modified (NEORAL) 100 MG capsule      03/09 1714        cycloSPORINE modified (NEORAL) 100 MG capsule      03/09 0514       cycloSPORINE modified (NEORAL) 25 MG capsule      03/09 0514          CBC:     Recent Labs  Lab 03/14/18  0415   WBC 7.44   RBC 2.61*   HGB 8.2*   HCT 24.7*      MCV 95   MCH 31.4*   MCHC 33.2     CMP:     Recent Labs  Lab 03/14/18  0415   *   CALCIUM 8.7   ALBUMIN 2.0*   PROT 5.8*      K 2.9*   CO2 25      BUN 76*   CREATININE 3.8*   ALKPHOS 97   ALT <5*   AST 25   BILITOT 1.6*     Coagulation:     Recent Labs  Lab 03/12/18  1004   INR 1.1     Labs within the past 24 hours have been reviewed.    Diagnostic Results:  None

## 2018-03-14 NOTE — PROGRESS NOTES
[facility]  Liver Transplant  Progress Note    Patient Name: Jhonny Diana  MRN: [id number]  Admission Date: 2018  Hospital Length of Stay: 62 days  Code Status: Full Code  Primary Care Provider: Primary Doctor No  Post-Operative Day: 146    ORGAN:   LIVER  Disease Etiology: Acute Alcoholic Hepatitis  Donor Type:    - Brain Death  CDC High Risk:   No  Donor CMV Status:   Donor CMV Status: Positive  Donor HBcAB:   Negative  Donor HCV Status:   Negative  Whole or Partial: Whole Liver  Biliary Anastomosis: End to End  Arterial Anatomy: Standard  Subjective:     History of Present Illness:  Jhonny Diana is a 26 y/o male with past medical history of alcoholic cirrhosis.  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 Found to have peritonitis in November (WBC 5000, 75% PNM) neg for bile leak. He was treated initially with vanc/cefepime. Repeat cell counts  with some improvement (WBC 1400, 45% PNM). He has undergone multiple paracenteses as well as abscess drainage of perihepatic fluid collections and treated with antimicrobial therapy but no positive cultures. Of note, biliary stricture also identified and ERCP performed on 2017 with sphincterotomy and biliary stent placed. Liver tests still have not normalized despite intervention, bilirubin and AP remain elevated. Other pertinent PMH current wound vac in place 2/2 wound infection, malnutrition requiring TPN for short course and ongoing hypoalbuminemia, and seizure activity while on prograf and has since been switched to cyclosporine without reoccurrence.  He presented to the ER for fever, abdominal pain, and N/V. He reports fever (103) for 1  day prior. Overnight, he developed N/V, reports small amount of green emesis with new left sided pain. He also endorses worsening SOB with exertion. He was scheduled as an outpatient for follow up paracentesis and IR drainage of fluid collection. CXR in ER shows large pleural effusion with subsegmental atelectasis. Infectious work up initiated in ER. His ANC is 900. Broad spectrum antibiotics initiated in ED. At admit, he denied chest pain, palpitations, diarrhea, constipation, back pain, or any sick contacts.    Hospital Course:  Thoracentesis (1.2L off), Paracentesis (1.6L off), and IR drainage of fluid collection 1/11, all fluids negative for infection. ID consulted. Broad spectrum antibiotics d/c'd 1/15. Chronically malnourished with poor PO intake, prealbumin 7. Shane tube placed 1/15 for tube feedings.     ERCP 1/17 with sludge and a biliary stone which was removed and stent exchanged.  Remained with n/v; therefore, TF remained on hold and TPN continued.   Pt with fever s/p ERCP on 1/17 which continued until 1/19.  Vanc/cefepime restarted.  Blood cx 1/17 and 1/18 NGTD.  ID reconsulted.  Fungal markers sent.  CT C/A/P obtained.  With large R pleural effusion and ascites- both drained 1/19 and negative for infx per cell count. Pt afebrile 1/20. Liver biopsy 1/23 - without rejection.  EGD 1/25 - unremarkable for source of GI symptoms.  Resume diet along with other GI recommendations. SHANE tube placed for tube feeds.       2/22: Pt again spiked temp of 101 overnight, now down to 99. Resent Bcx overnight, prelim NGTD. Transitioned to Erta from vanc/zosyn per ID. Planning for tagged WBC scan on Monday. R sided abdominal erythema/dependent edema improving today.  TF have been on hold since 2/20. Will continue to hold today per pt request. Pt working on diet. KUB yesterday with significant stool and gas. Declines colace, miralax, metamucil, but willing to take daily bisacodyl. Pt agreeable to suppository today. If  suppository not successful can give mag citrate through dylan tonight. Will also retrial Marinol tonight as pt reported improvement in appetite previously. Will monitor closely for sedation.     2/23:  2 small BM 2/22.  Tolerated HD.  Nelson removed.  Will attempt to eat this PM.      2/24:  Pt ate then vomited hamburger 2/23.  Will resume TPN.  D/C atovaquone, transition cyclo to Prograf, check Keppra level, increase Reglan to 0 mg, change twyla to BID for nausea.  Will give pentam today.    2/25:  TPN going well, pt ate 1/2 subway sandwhich without vomiting yesterday, will continue to monitor.  Plan to repeat EGD this week.  Pt with drop in H/h again and appears to be hemolyzing.  LDH elevated, hapto slightly low, LEE pending.  With neutropenia- Neupogen given.    2/26:  Pt with severe back pain s/p HD. Unclear if secondary to neupogen.  Repeat labs stable. Indium scan scheduled for 2/27- injection done today (back pain started prior). CT A/P obtained with fluid collection seen in pelvis.    2/27: Patient spiking fevers, 101. Indium scan, negative results. CT A/P obtained 2/26 with focal/loculated region of fluid in the lower right abdomen measuring approximately 8.7 x 5.5 cm. IR attempted drainage, however, per IR,there was  limited fluid interposed with bowel in the RLQ with no safe pocket seen for drainage.    2/28: Remains with low grade fevers. Neupogen given.     3/1: Patient with stuttering speech, trouble recalling specific words when speaking. Does have hx of seizure (on keppra) previously while on prograf. Switched to prograf 2/24 from cyclosporine. Neuro exam unremarkable. CT head without contrast obtained, unremarkable. In light of these symptoms, prograf d/c, cyclosporine restarted 3/1. Per transplant attending, held off on EGD at this time as concern anesthesia would put patient at further increase risk for seizure activity.     3/4: pt walked in room yesterday.  Had 1 BM.  No appetite still.  Cont TPN.   He reports swelling and back pain today.  He vomited after taking Oxy PO.  Fentanyl 12.5mg IV ordered.  Patient rested well afterward.  Remains afebrile.   ID following.   Cont Ertapenem.  Repeat blood and urine cx given continued malaise and stutter- blood cx prelim NGTD.  UA ok.  Urine cx pending.    Tentative plan for EGD on Monday to assess source of fevers/nausea/abdominal pain w bx for CMV and GVHD.  Will also reassess rather able to perform paracentesis.     3/5: no acute events overnight. Pt still not feeling well and with poor sleep overnight. Cont to have mumbling of speech at times but slightly better than before. Remains with abdominal pain but relieved with PO pain meds. Tolerated HD well today. Will plan for paracentesis and EGD tomorrow to assess for peritonitis and cause of persistent N/V respectively.     3/6: pt continues to have garbled/mumbled speech. EGD performed today and pt received sedation during procedure. Increased lethargy and dysarthria noted post procedure. VS stable. Paracentesis scheduled today as well to r/o peritonitis. Wbc count stable. Afebrile. Continue Ertapenem. HD yesterday and pt tolerated well, 2.5 L removed. Monitor.     3/7: MRI brain yesterday unremarkable. Blood cultures ngtd so far. Neurology consulted. EGD and Paracentesis yesterday. Para with 25 cc removed, wbc 28 segs 3%. Continue TPN. Monitor.    3/8: EUS and ERCP scheduled tomorrow. NPO after midnight. T bili continues trending up daily. Last ERCP on 2/19 with two stents placed. Ertapenem dc'd 3/7. Pt spiking temps today, tmax 100.9. Cefepime/vanc started. Oxycodone decreased to 5 mg q6h prn. Dc marinol and reglan for now. He continues to slur/stutter and have jerky movements throughout the day. Neurology following. EEG scheduled today. Monitor.     3/9: EUS/ERCP completed with choledoliathiasis.  Fever over night.  May need LP in future if mental status does not improve.      3/10- speech improved, neuro  suggesting lumbar puncture - schedule for Monday    3/11 - more lethargic, tmax 100.9, plan for lumbar puncture in am.    3/12 - mental status greatly improved, walking halls with PT, will hold off on LP for now.    3/13 - mental status remains stable, complains of pain to lower abdomen, bowel movement with suppository over night, KUB with non specific bowel gas pattern, would like to increase pain regimen - spoke with patient and mother this was counter intuitive as narcotics slow down the bowels, patient states understanding - will take colace religiously with increased pain medicine.  Also complaining of sob - chest xray and dose of diamox    3/14: Mentation good.  Continue cyclosporine.   Tolerated HD.  Decrease hydrocortisone dose once pt has cyclosporine level.  Transjug bx shows mild cholestatic hepatitis --> unable to cx since bx performed via EUS.  Will plan to obtain cx with next bx.    Scheduled Meds:   sodium chloride 0.9%   Intravenous Once    bisacodyl  10 mg Oral Daily    ceFEPime (MAXIPIME) IVPB  1 g Intravenous Q12H    cycloSPORINE modified  125 mg Oral BID    docusate sodium  100 mg Oral BID    epoetin maurisio (PROCRIT) injection  10,000 Units Intravenous Every Mon, Wed, Fri    ergocalciferol  50,000 Units Oral Q7 Days    fat emulsion 20%  250 mL Intravenous Once    heparin (porcine)  5,000 Units Subcutaneous Q8H    hydrocortisone sodium succinate  50 mg Intravenous Q8H    custom IVPB builder   Intravenous Q24H    levetiracetam oral soln  500 mg Oral BID    levothyroxine  75 mcg Oral Before breakfast    metronidazole  500 mg Intravenous Q8H    multivitamin  1 tablet Oral Daily    omeprazole  40 mg Oral QAM    polyethylene glycol  17 g Oral BID    psyllium husk (aspartame)  3.4 g Oral BID    ursodiol  300 mg Oral BID     Continuous Infusions:   TPN ADULT CENTRAL LINE CUSTOM 48 mL/hr at 03/13/18 2218    TPN ADULT CENTRAL LINE CUSTOM       PRN Meds:sodium chloride, sodium chloride,  sodium chloride 0.9%, sodium chloride 0.9%, sodium chloride 0.9%, acetaminophen, albuterol-ipratropium 2.5mg-0.5mg/3mL, bisacodyl, dextrose 50%, dextrose 50%, diphenhydrAMINE-zinc acetate 1-0.1%, glucagon (human recombinant), glucose, glucose, heparin (porcine), naloxone, ondansetron, ondansetron, oxyCODONE, oxyCODONE, prochlorperazine, simethicone, sodium chloride 0.9%, sodium chloride 0.9%    Review of Systems   Constitutional: Positive for activity change, appetite change and fatigue.   Gastrointestinal: Positive for abdominal distention, abdominal pain and constipation.   Genitourinary: Positive for decreased urine volume.   Allergic/Immunologic: Positive for immunocompromised state.     Objective:     Vital Signs (Most Recent):  Temp: 98.6 °F (37 °C) (03/14/18 0815)  Pulse: 83 (03/14/18 1000)  Resp: 16 (03/14/18 1000)  BP: 133/78 (03/14/18 1000)  SpO2: 98 % (03/14/18 0326) Vital Signs (24h Range):  Temp:  [98.5 °F (36.9 °C)-99 °F (37.2 °C)] 98.6 °F (37 °C)  Pulse:  [75-86] 83  Resp:  [16-20] 16  SpO2:  [95 %-99 %] 98 %  BP: (133-149)/(73-87) 133/78     Weight: 74 kg (163 lb 2.3 oz)  Body mass index is 26.33 kg/m².    Intake/Output - Last 3 Shifts       03/12 0700 - 03/13 0659 03/13 0700 - 03/14 0659 03/14 0700 - 03/15 0659    P.O. 600 660     I.V. (mL/kg)  0 (0)     Blood 350      Other 600 0     IV Piggyback 400 250     TPN 1707.6 462.4     Total Intake(mL/kg) 3657.6 (49.3) 1372.4 (18.5)     Urine (mL/kg/hr) 250 (0.1) 125 (0.1)     Emesis/NG output  0 (0)     Other 3600 (2) 0 (0)     Stool 0 (0) 0 (0)     Blood  0 (0)     Total Output 3850 125      Net -192.4 +1247.4             Urine Occurrence  0 x     Stool Occurrence 1 x 1 x     Emesis Occurrence  0 x           Physical Exam   Constitutional: He is oriented to person, place, and time. He appears well-developed.   Hand and temporal muscle wasting   HENT:   Head: Normocephalic.   Eyes: Pupils are equal, round, and reactive to light. Scleral icterus is  present.   Cardiovascular: Normal rate, regular rhythm, normal heart sounds and intact distal pulses.    Pulmonary/Chest: Effort normal. He has rales.   L base, decreased BS R base   Abdominal: Bowel sounds are normal. He exhibits distension. There is no tenderness.   Musculoskeletal: Normal range of motion. He exhibits edema.   Neurological: He is alert and oriented to person, place, and time. GCS eye subscore is 4. GCS verbal subscore is 5. GCS motor subscore is 6.   Skin: Skin is warm and dry.   Psychiatric: He has a normal mood and affect. His behavior is normal. Judgment and thought content normal.   Nursing note and vitals reviewed.      Laboratory:  Immunosuppressants         Stop Route Frequency     cycloSPORINE modified capsule 125 mg      -- Oral 2 times daily     cycloSPORINE modified (NEORAL) 25 MG capsule      03/09 1714       cycloSPORINE modified (NEORAL) 100 MG capsule      03/09 1714       cycloSPORINE modified (NEORAL) 100 MG capsule      03/09 0514       cycloSPORINE modified (NEORAL) 25 MG capsule      03/09 0514          CBC:     Recent Labs  Lab 03/14/18  0415   WBC 7.44   RBC 2.61*   HGB 8.2*   HCT 24.7*      MCV 95   MCH 31.4*   MCHC 33.2     CMP:     Recent Labs  Lab 03/14/18  0415   *   CALCIUM 8.7   ALBUMIN 2.0*   PROT 5.8*      K 2.9*   CO2 25      BUN 76*   CREATININE 3.8*   ALKPHOS 97   ALT <5*   AST 25   BILITOT 1.6*     Coagulation:     Recent Labs  Lab 03/12/18  1004   INR 1.1     Labs within the past 24 hours have been reviewed.    Diagnostic Results:  None    Assessment/Plan:     Liver transplanted    - Post op course complicated by fevers and hyperbilirubinemia.  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  - Liver US 1/8 showed 3.9 cm complex fluid collection anterior to right lobe and moderate nonspecific complex ascites inferior to transplant.   - IR placed drain 1/11/18, cell count negative for infection.  - PBS consulted. ERCP  1/17 with stone and sludge. Pt placed on Actigall - treated with 10 day course of abx.   - Bili elevated - liver u/s 1/28.  - Liver biopsy 1/23 without rejection.  - consulted PBS for ERCP - Choledocholithiasis was found along with sludge, stents placed 1/30.   - ERCP 2/19, overall unremarkable findings. Did place new larger stents. Will need repeat ERCP in 10 weeks. Bilirubin improving.  - Liver US reviewed w mod ascites.  Stent in CBD.    - Paracentesis 3/6 negative for infection, wbc 28 segs 3%.  - ERCP, EUS biopsy 3/9/18 - no rejection, mild cholestatic hepatitis seen.        Long-term use of immunosuppressant medication    - Maintenance IS with cyclosporine --> transitioned to Prograf 2/24 for continued nausea. However now with neurological symptoms with prograf and hx of seizure, prograf d/c and cyclosporine restarted 3/1.  - MMF on hold for infections and neutropenia.   - d/c cyclosporine started rapa & stress dose steroids 3/10  - mental status now improved, trial cyclosporine, d/c rapa, continue stress dose steroids, monitor.        Prophylactic immunotherapy    - See long term use of immunosuppression.         At risk for opportunistic infections    - CMV detected 2/1 now on tx dose valcyte. CMV PCR 2/15 not detected. CMV PCR 2/22, negative.  - Repeat CMV PCR 3/8 negative.  CMV PCR weekly.  - Pentam given 2/24.            Vitamin D deficiency    - start ergo 50K weekly.           Kidney transplant candidate              Other cytomegaloviral diseases    - detected at 370 on CMV PCR 2/1.  - case discussed with ID and will hold off on treatment at this time given low WBC.   - undetected CMV PCR 2/8.  - continue Valcyte 200 mg every Mon, Wed, Fri.  - CMV PCR 2/15 negative.  - Per ID, valcyte dose decreased to ppx dose on 2/18.  - CMV PCR 2/22 negative.   - CMV PCR 3/8 pending.  - Repeat scope 3/6 to assess persistent N/V and possible CMV causing these symptoms. Biopsies thus far normal, cmv stains pending.         Fever    - Started on vanc/cefepime for ERCP. Now transitioned to vanc/zosyn.  - Blood cultures sent, prelim NGTD. Repeat blood cultures and chest xray today 2/10.  - thoracentesis 2/1 - fluid negative for infection.  - atelectasis noted on ct scan - start breathing tx & CPT.  - now cmv positive - started treatment 2/5/18.  - taken to OR 2/7 for exp lap - fluid collections drained, cultures pending.  - Patient with low grade fever 2/15, 100.3.  - CT A/P 2/15 with no evidence of IA fluid infection. Did show R pleural effusion s/p thoracentesis, cell count negative for infection, cx pending.  - Repeat blood cx 2/15 prelim NGTD, CMV PCR 2/15 negative   Complaining of abdominal pain. Is tender in RUQ.  - Remains with fevers.  - Bilirubin remains elevated. Concerning for cholangitis. Antibiotics broadened 2/17. ID re consulted.   - ERCP  2/19, overall unremarkable findings. Did place new larger stents. Will need repeat ERCP in 10 weeks. Bilirubin is stable post ERCP.  -Tmax 101 2/21 PM again ID following. Plan to transition vanc/zosyn to ertapenem 2/22 as no improvement noted.   - Erta started 2/22.  - CT A/P obtained 2/26 with focal/loculated region of fluid in the lower right abdomen measuring approximately 8.7 x 5.5 cm.   - IR attempted drainage on fluid collection on 2/27, however, per IR,there was limited fluid interposed with bowel in the RLQ with no safe pocket seen for drainage.  -Tagged WBC scan 2/27, negative results.  - Remains with low grade fevers.   - Per transplant attending, will hold off on EGD at this time as concern anesthesia will put patient at further increase risk for seizure activity. Will plan for potenial EGD on Monday. Discussed with GI, appreciate their assistance.  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 EBV PCR negative. Adenovirus pending. Repeat CMV PCR negative.    - Back pain and abdominal pain persists. Last BM 3/5 with brown bomb.   - ID following. Cont Ertapenem dc'd  3/7 - due to AMS   - Tmax 101.1 overnight 3/9 - Cefepime/vanc/flagyl.  Monitor.   - Remains afebrile x several days  - Bx with cholestatic hepatitis --> couldn't cx secondary to EUS contamination --> will cx next bx          Constipation    - Enema ordered 1/20 and 1/21 with BM.  - Encourage ambulation.  - Decrease narcotics.  - D/c metamucil and miralax bid as makes patient nauseated.  - Increase reglan qid.  - Small bowel follow through with normal findings.   - Schedule bisacodyl for bowel regimen.   - KUB 2/21 with significant amounts of stool and gas. Pt declines miralax, metamucil, colace.   - encouraged suppository.  - Improved on KUB 2/23.  - Pt moving bowels now but will have to keep aggressive bowel regimen on board.   - Brown bomb 3/5 with BM x 1.  - KUB 3/8 with findings concerning for a partial bowel obstruction.  - KUB 3/13 with nonspecific bowel gas pattern, encouraged taking colace, will order enema        Severe protein-calorie malnutrition    - Poor PO intake since transplant requiring short course of TPN during previous hospital stay.   - albumin remains decreased but appetite slowly improving per pt.   - Dietary consulted. Will need to closely monitor PO intake.   - supplements also ordered.   - SHANE with tube feeding start 1/16. Stopped 1/17/18, was not tolerating. Shane removed 1/20.  - prealbumin 7 on 1/15.  - TPN started 1/16/18.   - Pt able to eat minimally 1/21 and 1/22 which is an improvement.  - GI consulted - EGD reviewed, increased ppi bid, miralax bid, and metamucil bid, decreased narcotics.  - continue reglan, placed SHANE tube with tube feeds - transitioned to nightly feeds. Not tolerating well due to nausea.   - small bowel follow through with normal findings.   - consulted gi for possible gj tube placement, holding off on this for now as with ongoing fevers.  - Dietary changed tube feeds back to 24 hours from nightly 2/19 as not eating enough during the day to meet caloric needs. TF on  hold since 2/20.  - retrial marinol 2/22 as pt reports that's the only thing helping his appetite, in the evening 2/2 concern for possible drowsiness  - Pt then developed persistent N/V again and TPN started 2/24. Cont TPN for now.   - Pt able to eat 1/4 sandwich without vomiting 2/25.  Not been able to eat anything more than that in 48 hours per pt and mother.    - EGD 3/6 with normal esophagus    - congested, erythematous and nodular mucosa in the stomach.    - biliary stent in the duodenum    - one duodenal ulcer with a clean ulcer base (Emir Class III).    - biopsies taken in the duodenal bulb and second and third portion of the duodenum.  - hold reglan for neuro symptoms.  - continue TPN, tolerating diet today, monitor        Nausea and vomiting    - h/o constipation, reports having regular BM's with bowel regimen.  - vomiting episode x 1 at home. Anti-emetics ordered PRN.  - KUB repeated 1/14/18 given increased abdominal pain- mild, generalized bowel distention suggesting ileus.  Diet changed to clears as tolerated for bowel rest .  - SHANE tube placed 1/16 for tube feeds. Not tolerating tube feeds, stopped 1/17/18.  - D/C shane 1/19, now in place as of 1/25.  - chronic constipation, Cont bowel regimen.   - See protein calorie malnutrition.  - TF have been on hold since 2/20 for N/V. shane d/c'd 2/23 as not tolerating TF  - 2/24 changes: Increased Reglan to 10 mg, D/C atovaquone, transitioned cyclo to Prograf, change twyla to BID, check Keppra level  - GI consulted. EGD 3/6 as with continued nausea/vomiting with po intake. Appreciate GI recs.    - Cont to have nausea and vomiting w medications and eating.  Cont TPN.     - holding reglan 3/8 due to neurologic symptoms.            Recurrent pleural effusion on right    - CXR in ER with large right pleural effusion with subjective c/o worsening SOB.  - thoracentesis completed- 1200 ml removed.  SOB with significant improvement.   - Cell count reviewed and negative  for infection.   - resp even and non labored.  O2 sat 94-98%.  - Repeat thora 1/19 negative for infection per cell count.  - Reaccumulating fluid on xray 1/21.  - thoracentesis 1/31 - 1.4L removed, negative for infection.  - Reaccumulating fluid again on xray 2/10.   - Thoracentesis performed  2/16 with 1500 cc off, cell count neg for infection, cx no growth to date.   - CT A/P 2/26 with large R pleural effusion, may need repeat thora if fevers reoccur.    - complaining of sob, obtain chest xray, trial dose of diamox 3/14 with minimal UOP  - chest xray with large pleural effusion --> may need thora soon  - HD today        Biliary stricture of transplanted liver    - Tbili with increase prompting ERCP 1/30 with stones and sludge, stents placed.  - Repeat ERCP 2/19 as was having recurrent fevers with bilirubin stuck at 2.0-2.2 with overall unremarkable findings seen. Did place new larger stents.   - US 3/2 stent in CBD  - ERCP/EUS 3/9 with mental stent placed in bile duct, stones removed        Anemia of chronic disease    - 1 U PRBC 3/12  - Monitor daily labs        Delayed surgical wound healing    - wd vac removed 1/12/18.  Wound healing well. Aquacel AG and Mepilex border dressing applied via wound care recs.           Seizure    - Patient with stuttering speech, trouble recalling specific words when speaking. Does have hx of seizure (on keppra) previously while on prograf. Switched to prograf over the weekend from cyclosporine. Neuro exam unremarkable.   - CT head without contrast obtained, unremarkable.   - In light of these symptoms, prograf d/c, restart cyclosporine 3/1.  - remains w/o seizure. Cont to have stutter/garbled speech/difficulty finding words.  Worse post EGD as sedation was given.  - MRI 3/6 unremarkable.  - EEG completed 3/8 - mild, generalized, non-specific cerebral dysfunction, no epileptiform activity  - monitor closely. VSS.          Acute renal failure with tubular necrosis    - HD resumed  on previous admission. Now anuric and dialyzes M-W-F.  - Nephrology following.    - Goal is for kidney txp in future- currently not a candidate due to health status.            VTE Risk Mitigation         Ordered     heparin (porcine) injection 1,000 Units  As needed (PRN)     Route:  Intra-Catheter        03/12/18 1134     heparin (porcine) injection 5,000 Units  Every 8 hours     Route:  Subcutaneous        03/08/18 1622     Medium Risk of VTE  Once      01/11/18 0351     Place sequential compression device  Until discontinued      01/11/18 0351          The patients clinical status was discussed at multidisplinary rounds, involving transplant surgery, transplant medicine, pharmacy, nursing, nutrition, and social work    Discharge Planning:  Monitor kidney function --> may need outpt HD  Monitor HH needs vs rehab for deconditioned status      Geronimo Vera PA-C  Liver Transplant  [facility]

## 2018-03-15 ENCOUNTER — CONFERENCE (OUTPATIENT)
Dept: TRANSPLANT | Facility: CLINIC | Age: 28
End: 2018-03-15

## 2018-03-15 LAB
ACID FAST MOD KINY STN SPEC: NORMAL
ALBUMIN SERPL BCP-MCNC: 1.9 G/DL
ALP SERPL-CCNC: 104 U/L
ALT SERPL W/O P-5'-P-CCNC: 5 U/L
ANION GAP SERPL CALC-SCNC: 8 MMOL/L
ANISOCYTOSIS BLD QL SMEAR: SLIGHT
AST SERPL-CCNC: 25 U/L
BASOPHILS NFR BLD: 0 %
BILIRUB SERPL-MCNC: 1.4 MG/DL
BUN SERPL-MCNC: 37 MG/DL
CALCIUM SERPL-MCNC: 8.4 MG/DL
CHLORIDE SERPL-SCNC: 105 MMOL/L
CO2 SERPL-SCNC: 26 MMOL/L
CREAT SERPL-MCNC: 2.6 MG/DL
CYCLOSPORINE BLD LC/MS/MS-MCNC: 255 NG/ML
DACRYOCYTES BLD QL SMEAR: ABNORMAL
DIFFERENTIAL METHOD: ABNORMAL
EOSINOPHIL NFR BLD: 0 %
ERYTHROCYTE [DISTWIDTH] IN BLOOD BY AUTOMATED COUNT: 19.2 %
EST. GFR  (AFRICAN AMERICAN): 37.1 ML/MIN/1.73 M^2
EST. GFR  (NON AFRICAN AMERICAN): 32.1 ML/MIN/1.73 M^2
GLUCOSE SERPL-MCNC: 115 MG/DL
HCT VFR BLD AUTO: 26 %
HGB BLD-MCNC: 8.3 G/DL
HYPOCHROMIA BLD QL SMEAR: ABNORMAL
IMM GRANULOCYTES # BLD AUTO: ABNORMAL K/UL
IMM GRANULOCYTES NFR BLD AUTO: ABNORMAL %
LYMPHOCYTES NFR BLD: 10 %
MAGNESIUM SERPL-MCNC: 2 MG/DL
MCH RBC QN AUTO: 31.2 PG
MCHC RBC AUTO-ENTMCNC: 31.9 G/DL
MCV RBC AUTO: 98 FL
METAMYELOCYTES NFR BLD MANUAL: 2 %
MONOCYTES NFR BLD: 10 %
MYCOBACTERIUM SPEC QL CULT: NORMAL
NEUTROPHILS NFR BLD: 71 %
NEUTS BAND NFR BLD MANUAL: 7 %
NRBC BLD-RTO: 0 /100 WBC
OVALOCYTES BLD QL SMEAR: ABNORMAL
PHOSPHATE SERPL-MCNC: 1.6 MG/DL
PLATELET # BLD AUTO: 165 K/UL
PLATELET BLD QL SMEAR: ABNORMAL
PMV BLD AUTO: 11.3 FL
POIKILOCYTOSIS BLD QL SMEAR: SLIGHT
POLYCHROMASIA BLD QL SMEAR: ABNORMAL
POTASSIUM SERPL-SCNC: 3.6 MMOL/L
PROT SERPL-MCNC: 5.6 G/DL
RBC # BLD AUTO: 2.66 M/UL
SCHISTOCYTES BLD QL SMEAR: ABNORMAL
SCHISTOCYTES BLD QL SMEAR: PRESENT
SODIUM SERPL-SCNC: 139 MMOL/L
WBC # BLD AUTO: 6.37 K/UL

## 2018-03-15 PROCEDURE — 99900035 HC TECH TIME PER 15 MIN (STAT)

## 2018-03-15 PROCEDURE — 80158 DRUG ASSAY CYCLOSPORINE: CPT

## 2018-03-15 PROCEDURE — A4217 STERILE WATER/SALINE, 500 ML: HCPCS | Performed by: PHYSICIAN ASSISTANT

## 2018-03-15 PROCEDURE — 25000003 PHARM REV CODE 250: Performed by: PHYSICIAN ASSISTANT

## 2018-03-15 PROCEDURE — 97116 GAIT TRAINING THERAPY: CPT

## 2018-03-15 PROCEDURE — 97110 THERAPEUTIC EXERCISES: CPT

## 2018-03-15 PROCEDURE — 63600175 PHARM REV CODE 636 W HCPCS: Performed by: PHYSICIAN ASSISTANT

## 2018-03-15 PROCEDURE — 83735 ASSAY OF MAGNESIUM: CPT

## 2018-03-15 PROCEDURE — 97803 MED NUTRITION INDIV SUBSEQ: CPT | Performed by: DIETITIAN, REGISTERED

## 2018-03-15 PROCEDURE — S0030 INJECTION, METRONIDAZOLE: HCPCS | Performed by: NURSE PRACTITIONER

## 2018-03-15 PROCEDURE — 63600175 PHARM REV CODE 636 W HCPCS: Mod: JG | Performed by: SURGERY

## 2018-03-15 PROCEDURE — 94664 DEMO&/EVAL PT USE INHALER: CPT

## 2018-03-15 PROCEDURE — 99233 SBSQ HOSP IP/OBS HIGH 50: CPT | Mod: ,,, | Performed by: INTERNAL MEDICINE

## 2018-03-15 PROCEDURE — 63600175 PHARM REV CODE 636 W HCPCS: Performed by: NURSE PRACTITIONER

## 2018-03-15 PROCEDURE — 25000003 PHARM REV CODE 250: Performed by: NURSE PRACTITIONER

## 2018-03-15 PROCEDURE — 20600001 HC STEP DOWN PRIVATE ROOM

## 2018-03-15 PROCEDURE — 97535 SELF CARE MNGMENT TRAINING: CPT

## 2018-03-15 PROCEDURE — 99233 SBSQ HOSP IP/OBS HIGH 50: CPT | Mod: 24,,, | Performed by: PHYSICIAN ASSISTANT

## 2018-03-15 PROCEDURE — B4185 PARENTERAL SOL 10 GM LIPIDS: HCPCS | Performed by: PHYSICIAN ASSISTANT

## 2018-03-15 PROCEDURE — 80053 COMPREHEN METABOLIC PANEL: CPT

## 2018-03-15 PROCEDURE — 84100 ASSAY OF PHOSPHORUS: CPT

## 2018-03-15 PROCEDURE — 25000003 PHARM REV CODE 250: Performed by: SURGERY

## 2018-03-15 RX ORDER — PREDNISONE 10 MG/1
20 TABLET ORAL DAILY
Status: DISCONTINUED | OUTPATIENT
Start: 2018-03-16 | End: 2018-03-22

## 2018-03-15 RX ADMIN — RETINOL, ERGOCALCIFEROL, .ALPHA.-TOCOPHEROL ACETATE, DL-, PHYTONADIONE, ASCORBIC ACID, NIACINAMIDE, RIBOFLAVIN 5-PHOSPHATE SODIUM, THIAMINE HYDROCHLORIDE, PYRIDOXINE HYDROCHLORIDE, DEXPANTHENOL, BIOTIN, FOLIC ACID, AND CYANOCOBALAMIN: KIT at 09:03

## 2018-03-15 RX ADMIN — METRONIDAZOLE 500 MG: 500 INJECTION, SOLUTION INTRAVENOUS at 01:03

## 2018-03-15 RX ADMIN — OXYCODONE HYDROCHLORIDE 5 MG: 5 TABLET ORAL at 06:03

## 2018-03-15 RX ADMIN — HEPARIN SODIUM 5000 UNITS: 5000 INJECTION, SOLUTION INTRAVENOUS; SUBCUTANEOUS at 05:03

## 2018-03-15 RX ADMIN — METRONIDAZOLE 500 MG: 500 INJECTION, SOLUTION INTRAVENOUS at 05:03

## 2018-03-15 RX ADMIN — CEFEPIME 1 G: 1 INJECTION, POWDER, FOR SOLUTION INTRAMUSCULAR; INTRAVENOUS at 05:03

## 2018-03-15 RX ADMIN — LEVETIRACETAM 500 MG: 100 SOLUTION ORAL at 09:03

## 2018-03-15 RX ADMIN — DOCUSATE SODIUM 100 MG: 100 CAPSULE, LIQUID FILLED ORAL at 08:03

## 2018-03-15 RX ADMIN — OXYCODONE HYDROCHLORIDE 5 MG: 5 TABLET ORAL at 12:03

## 2018-03-15 RX ADMIN — OMEPRAZOLE 40 MG: 40 CAPSULE, DELAYED RELEASE ORAL at 05:03

## 2018-03-15 RX ADMIN — ERGOCALCIFEROL 50000 UNITS: 1.25 CAPSULE ORAL at 08:03

## 2018-03-15 RX ADMIN — URSODIOL 300 MG: 300 CAPSULE ORAL at 09:03

## 2018-03-15 RX ADMIN — CYCLOSPORINE 125 MG: 100 CAPSULE, LIQUID FILLED ORAL at 08:03

## 2018-03-15 RX ADMIN — ONDANSETRON HYDROCHLORIDE 4 MG: 2 INJECTION, SOLUTION INTRAMUSCULAR; INTRAVENOUS at 09:03

## 2018-03-15 RX ADMIN — HYDROCORTISONE SODIUM SUCCINATE 50 MG: 100 INJECTION, POWDER, FOR SOLUTION INTRAMUSCULAR; INTRAVENOUS at 05:03

## 2018-03-15 RX ADMIN — LEVETIRACETAM 500 MG: 100 SOLUTION ORAL at 08:03

## 2018-03-15 RX ADMIN — HEPARIN SODIUM 5000 UNITS: 5000 INJECTION, SOLUTION INTRAVENOUS; SUBCUTANEOUS at 10:03

## 2018-03-15 RX ADMIN — METRONIDAZOLE 500 MG: 500 INJECTION, SOLUTION INTRAVENOUS at 09:03

## 2018-03-15 RX ADMIN — SODIUM CHLORIDE: 9 INJECTION, SOLUTION INTRAVENOUS at 12:03

## 2018-03-15 RX ADMIN — OXYCODONE HYDROCHLORIDE 5 MG: 5 TABLET ORAL at 10:03

## 2018-03-15 RX ADMIN — LEVOTHYROXINE SODIUM 75 MCG: 75 TABLET ORAL at 05:03

## 2018-03-15 RX ADMIN — URSODIOL 300 MG: 300 CAPSULE ORAL at 08:03

## 2018-03-15 RX ADMIN — SOYBEAN OIL 250 ML: 20 INJECTION, SOLUTION INTRAVENOUS at 09:03

## 2018-03-15 RX ADMIN — CYCLOSPORINE 100 MG: 100 CAPSULE, LIQUID FILLED ORAL at 05:03

## 2018-03-15 RX ADMIN — BISACODYL 10 MG: 5 TABLET, COATED ORAL at 08:03

## 2018-03-15 RX ADMIN — OXYCODONE HYDROCHLORIDE 5 MG: 5 TABLET ORAL at 09:03

## 2018-03-15 RX ADMIN — DOCUSATE SODIUM 100 MG: 100 CAPSULE, LIQUID FILLED ORAL at 09:03

## 2018-03-15 RX ADMIN — OXYCODONE HYDROCHLORIDE 5 MG: 5 TABLET ORAL at 05:03

## 2018-03-15 RX ADMIN — OXYCODONE HYDROCHLORIDE 5 MG: 5 TABLET ORAL at 01:03

## 2018-03-15 NOTE — PROGRESS NOTES
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, CMV D+/R+, steroid induction, on maintenance tacro/MMF/pred; c/b seizures, LAURA, superificial wound infection s/p wound vac to [facility] through 1/12, and several recent admissions on 11/24 and 12/3 with culture negative peritonitis with peak ascites WBC ~5k due to suspected indolent biliary leakage s/p sphincterotomy/biliary stent placement over CBD stricture on 12/6 ERCP vs another IA source) who was admitted on 1/11/2017 with acute onset fevers and acute on chronic N/V/abdominal pain (RLQ worst) and was found to have a loculated RLQ collection on imaging (not safe for percutaneous drainage), for which, after failure with more conservative treatments, he ultimately underwent laparoscopic drainage on 2/7/2018 (with dark brown fluid drained, culture negative but after extensive antibiotic exposure) but without complete resolution. He achieved defervescence but had persistent N/V/abdominal pain on empiric ertapenem with persistent loculated fluid noted on imaging that is not accessible to percutaneous drainage. 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 His recent course over the past week was c/b AMS/neurologic symptoms seemingly due to a drug effect (as they have improved as of 3/13 following recent cessation of ertapenem and CSA) and recrudescent fevers after transition off carbapenem that have finally stopped the last few days of unknown etiology. He remains tenuous.     - would continue empiric vanc/cefepime/flagyl for now and observe GI symptoms  - additional prophylaxis per protocol (valcyte, atovaquone given bactrim allergy, and isavuconazole given complex course)  - will hold on LP for now -- if AMS/fevers recur, would pursue            Anticipated Disposition: pending improvement    Thank you for your consult. I will follow-up with patient. Please contact us if you have any additional questions.     Melanie Bergman MD  Transplant ID Attending  [phone]    Melanie Bergman MD  Infectious Disease  [facility]    Subjective:     Principal Problem:Fever    HPI: No notes on file  Interval History: No acute change today in neuro status. Afebrile. Still has some nausea and abdominal pain but hungry currently.    Review of Systems   Constitutional: Negative for activity change, appetite change, chills, fatigue and fever.   HENT: Negative for congestion and facial swelling.    Eyes: Negative for pain, discharge and visual disturbance.   Respiratory: Negative for cough, chest tightness, shortness of breath and wheezing.    Cardiovascular: Negative for chest pain, palpitations and leg swelling.   Gastrointestinal: Positive for abdominal distention, abdominal pain and nausea. Negative for constipation and vomiting.   Endocrine: Negative.    Genitourinary:        Anuric   Musculoskeletal: Negative for arthralgias, back pain and myalgias.   Skin: Positive for wound. Negative for color change and rash.    Allergic/Immunologic: Positive for immunocompromised state.   Neurological: Negative for dizziness, seizures, weakness and headaches.   Psychiatric/Behavioral: Negative for confusion, decreased concentration and dysphoric mood. The patient is not nervous/anxious.    All other systems reviewed and are negative.      Objective:     Vital Signs (Most Recent):  Temp: 99.2 °F (37.3 °C) (02/17/18 1542)  Pulse: 90 (02/17/18 1542)  Resp: 16 (02/17/18 1542)  BP: 135/85 (02/17/18 1542)  SpO2: 95 % (02/17/18 1542) Vital Signs (24h Range):  Temp:  [98.6 °F (37 °C)-101.2 °F (38.4 °C)] 99.2 °F (37.3 °C)  Pulse:  [84-92] 90  Resp:  [16-20] 16  SpO2:  [92 %-96 %] 95 %  BP: (128-137)/(75-85) 135/85     Weight: 60.2 kg (132 lb 11.5 oz)  Body mass index is 21.42 kg/m².    Estimated Creatinine Clearance: 26.8 mL/min (A) (based on SCr of 3.5 mg/dL (H)).    Physical Exam   Constitutional: He is oriented to person, place, and time. He appears well-developed. No distress.   Temporal and distal extremity muscle wasting   HENT:   Head: Normocephalic and atraumatic.   Mouth/Throat: No oropharyngeal exudate.   dylan tube   Eyes: EOM are normal. Pupils are equal, round, and reactive to light. No scleral icterus.   Neck: Normal range of motion. Neck supple. No JVD present. No thyromegaly present.   Cardiovascular: Normal rate, regular rhythm, normal heart sounds and intact distal pulses.    No murmur heard.  Pulmonary/Chest: Effort normal. No respiratory distress. He has no wheezes. He exhibits no tenderness.   Diminished to RLL   Abdominal: Soft. Bowel sounds are normal. He exhibits ascites. He exhibits no distension. There is tenderness. There is no rebound and no guarding.   Dressing to chevron.  Wd vac removed 1/12/18  Dependent edema present R flank; surrounding erythema   Musculoskeletal: Normal range of motion. He exhibits no tenderness. Edema: 2+ LE edema.   Neurological: He is alert and oriented to person, place, and time. He has normal  reflexes.   Skin: Skin is warm and dry. He is not diaphoretic. No erythema.   Psychiatric: His mood appears not anxious.   Nursing note and vitals reviewed.      Significant Labs:   CBC:     Recent Labs  Lab 02/16/18  0436 02/17/18  0343   WBC 2.85* 2.23*   HGB 8.8* 7.8*   HCT 26.8* 23.9*    134*     CMP:     Recent Labs  Lab 02/16/18  0436 02/17/18  0343   * 137   K 4.0 3.8    105   CO2 23 23   GLU 84 81   BUN 23* 13   CREATININE 5.1* 3.5*   CALCIUM 9.5 8.8   PROT 5.6* 5.1*   ALBUMIN 2.0* 1.8*   BILITOT 2.2* 2.1*   ALKPHOS 125 111   AST 25 18   ALT <5* <5*   ANIONGAP 11 9   EGFRNONAA 14.2* 22.4*       Significant Imaging: I have reviewed all pertinent imaging results/findings within the past 24 hours.     CT CAP:   Post surgical changes prior hepatic transplant.  Percutaneous surgical drain with notable decrease in size of fluid collection in lower abdomen.  Small amount of fluid tracking elsewhere within the abdomen and pelvis.  Large volume right pleural fluid, increased in size since prior exam, with associated right lower lobe atelectasis.  Diffuse body wall edema.  Small pericardial effusion.    Microbiology:  1/11 blood cx: negative  1/11 pleural fluid cx: negative  1/11 ascites cx: negative  1/17 blood cx: negative  1/18 blood cx: negative  1/19 pleural fluid cx: NGTD  1/19 ascites cx: NGTD  2/1 RVP negative  2/7 OR cx: NGTD  2/11 blood cx: negative  2/15 blood cx: negative  2/16 pleural fluid cx: negative

## 2018-03-15 NOTE — PT/OT/SLP PROGRESS
Occupational Therapy   Treatment    Name: Jhonny Diana  MRN: [id number]  Admitting Diagnosis:  Fever  6 Days Post-Op    Recommendations:     Discharge Recommendations: outpatient PT  Discharge Equipment Recommendations:  bath bench  Barriers to discharge:  None    Subjective     Communicated with: nsg prior to session. Co-tx with PT  Pain/Comfort:  · Pain Rating 1: 0/10    Patients cultural, spiritual, Adventist conflicts given the current situation: none    Objective:     Patient found with: central line, telemetry    General Precautions: Standard, fall   Orthopedic Precautions:N/A   Braces:       Occupational Performance:    Bed Mobility:    · spvn sup to sit  ·      Functional Mobility/Transfers:  · Transfers with CGA  · Functional Mobility: ambulated x 6 min with CGA without rest, pushing w/c and assist with IV pole    Activities of Daily Living:  · LE dress with setup to bedside/spvn    Patient left up on couch with mom present    AMPA 6 Click:  AMPA Total Score: 19    Treatment & Education:  Pt performed ambulation in hallway x 6 min without rest pushing w/c, performed BUE weighted shoulder presses x 15 reps with 2L coke bottle, biceps x 20 reps with 2L coke bottle, green theraband horizontal abduction x 15 reps, sit to stand x 10 reps with spvn from couch, alternated exercises with PT after ambulation for increased tolerance and activity; educated pt and mom on progression of theraband exercises and resistance with exercises, rec. For outpatient PT when discharged, con't with OOB/exerises as much as tolerated.   Education:    Assessment:     Jhonny Diana is a 27 y.o. male with a medical diagnosis of Fever.  He presents with increased ADL and mobility status.  Performance deficits affecting function are weakness, impaired endurance, impaired self care skills, impaired functional mobilty, gait instability, impaired balance, decreased upper extremity function, decreased lower extremity function, impaired  cardiopulmonary response to activity.      Rehab Prognosis:  good; patient would benefit from acute skilled OT services to address these deficits and reach maximum level of function.       Plan:     Patient to be seen 4 x/week to address the above listed problems via self-care/home management, community/work re-entry, therapeutic activities, therapeutic exercises  · Plan of Care Expires: 18  · Plan of Care Reviewed with: patient, mother    This Plan of care has been discussed with the patient who was involved in its development and understands and is in agreement with the identified goals and treatment plan    GOALS:    Occupational Therapy Goals        Problem: Occupational Therapy Goal    Goal Priority Disciplines Outcome Interventions   Occupational Therapy Goal     OT, PT/OT Ongoing (interventions implemented as appropriate)    Description:  Goals to be met by:  2 Weeks (3/24/2018)    Patient will increase functional independence with ADLs by performin. Supine to sit with Supervision.--met  2. Sit to Stand transfers with Supervision.   3. Toilet transfer to toilet with Supervision.  4. Grooming while standing with Supervision.  5. UE Dressing with Supervision.- met 3/12/18  6. LE Dressing with Minimal Assistance-met 3/12/18, revised: LE dress with spvn                           Time Tracking:     OT Date of Treatment: 03/15/18  OT Start Time: 1130  OT Stop Time: 1212  OT Total Time (min): 42 min    Billable Minutes:Self Care/Home Management 10 min  Therapeutic Exercise 32 min    Zulma Renteria, OT  3/15/2018

## 2018-03-15 NOTE — PLAN OF CARE
Problem: Physical Therapy Goal  Goal: Physical Therapy Goal  Goals to be met by: 3/19/18     Patient will increase functional independence with mobility by performin. Supine to sit with Modified [place]. - GOAL MET  2. Sit to stand transfer with Minimal Assistance. - GOAL MET  3. Bed to chair transfer with Contact Guard Assistance using Rolling Walker. - GOAL MET  4. Gait  x 150 feet with Contact Guard Assistance using Rolling Walker. - GOAL MET    5. Sit to supine with Modified [place].  6. Lower extremity exercise program x 20 reps per handout, with assistance as needed.  7. Pt to perf 10x sit<>stand: 1 min 18 sec, to demonstrate improvements in B LE mm strength.  8. Pt to perf 6MWT: amb 566', to demonstrate a clinically significant improvement in aerobic capacity.    9. Pt to amb 150' with DOC YANCEY.     Outcome: Ongoing (interventions implemented as appropriate)  Pt achieved 4 goals.  New goals added to more accurately reflect CLOF.

## 2018-03-15 NOTE — ASSESSMENT & PLAN NOTE
- Poor PO intake since transplant requiring short course of TPN during previous hospital stay.   - albumin remains decreased but appetite slowly improving per pt.   - Dietary consulted. Will need to closely monitor PO intake.   - supplements also ordered.   - SHANE with tube feeding start 1/16. Stopped 1/17/18, was not tolerating. Shane removed 1/20.  - prealbumin 7 on 1/15.  - TPN started 1/16/18.   - Pt able to eat minimally 1/21 and 1/22 which is an improvement.  - GI consulted - EGD reviewed, increased ppi bid, miralax bid, and metamucil bid, decreased narcotics.  - continue reglan, placed SHANE tube with tube feeds - transitioned to nightly feeds. Not tolerating well due to nausea.   - small bowel follow through with normal findings.   - consulted gi for possible gj tube placement, holding off on this for now as with ongoing fevers.  - Dietary changed tube feeds back to 24 hours from nightly 2/19 as not eating enough during the day to meet caloric needs. TF on hold since 2/20.  - retrial marinol 2/22 as pt reports that's the only thing helping his appetite, in the evening 2/2 concern for possible drowsiness  - Pt then developed persistent N/V again and TPN started 2/24. Cont TPN for now.   - Pt able to eat 1/4 sandwich without vomiting 2/25.  Not been able to eat anything more than that in 48 hours per pt and mother.    - EGD 3/6 with normal esophagus    - congested, erythematous and nodular mucosa in the stomach.    - biliary stent in the duodenum    - one duodenal ulcer with a clean ulcer base (Emir Class III).    - biopsies taken in the duodenal bulb and second and third portion of the duodenum.  - Hold Reglan for neuro symptoms.  - Continue TPN, tolerating diet today  - Calorie count initiated x 48 hours to reassess TPN and assess need for TF

## 2018-03-15 NOTE — ASSESSMENT & PLAN NOTE
- Started on vanc/cefepime for ERCP. Now transitioned to vanc/zosyn.  - Blood cultures sent, prelim NGTD. Repeat blood cultures and chest xray today 2/10.  - thoracentesis 2/1 - fluid negative for infection.  - atelectasis noted on ct scan - start breathing tx & CPT.  - now cmv positive - started treatment 2/5/18.  - taken to OR 2/7 for exp lap - fluid collections drained, cultures pending.  - Patient with low grade fever 2/15, 100.3.  - CT A/P 2/15 with no evidence of IA fluid infection. Did show R pleural effusion s/p thoracentesis, cell count negative for infection, cx pending.  - Repeat blood cx 2/15 prelim NGTD, CMV PCR 2/15 negative   Complaining of abdominal pain. Is tender in RUQ.  - Remains with fevers.  - Bilirubin remains elevated. Concerning for cholangitis. Antibiotics broadened 2/17. ID re consulted.   - ERCP  2/19, overall unremarkable findings. Did place new larger stents. Will need repeat ERCP in 10 weeks. Bilirubin is stable post ERCP.  -Tmax 101 2/21 PM again ID following. Plan to transition vanc/zosyn to ertapenem 2/22 as no improvement noted.   - Erta started 2/22.  - CT A/P obtained 2/26 with focal/loculated region of fluid in the lower right abdomen measuring approximately 8.7 x 5.5 cm.   - IR attempted drainage on fluid collection on 2/27, however, per IR,there was limited fluid interposed with bowel in the RLQ with no safe pocket seen for drainage.  -Tagged WBC scan 2/27, negative results.  - Remains with low grade fevers.   - Per transplant attending, will hold off on EGD at this time as concern anesthesia will put patient at further increase risk for seizure activity. Will plan for potenial EGD on Monday. Discussed with GI, appreciate their assistance.  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 EBV PCR negative. Adenovirus pending. Repeat CMV PCR negative.    - Back pain and abdominal pain persists. Last BM 3/5 with brown bomb.   - ID following. Cont Ertapenem dc'd 3/7 - due to  AMS   - Tmax 101.1 overnight 3/9 - Cefepime/vanc/flagyl.  Monitor.   - Remains afebrile x several days  - Bx with cholestatic hepatitis --> couldn't cx secondary to EUS contamination --> will cx next bx  - Plan to continue abx x 14 days to treat cholangitis --> stop date 3/23.

## 2018-03-15 NOTE — SUBJECTIVE & OBJECTIVE
Interval History: No acute change today in neuro status. Afebrile. Still has some nausea and abdominal pain but hungry currently.    Review of Systems   Constitutional: Negative for activity change, appetite change, chills, fatigue and fever.   HENT: Negative for congestion and facial swelling.    Eyes: Negative for pain, discharge and visual disturbance.   Respiratory: Negative for cough, chest tightness, shortness of breath and wheezing.    Cardiovascular: Negative for chest pain, palpitations and leg swelling.   Gastrointestinal: Positive for abdominal distention, abdominal pain and nausea. Negative for constipation and vomiting.   Endocrine: Negative.    Genitourinary:        Anuric   Musculoskeletal: Negative for arthralgias, back pain and myalgias.   Skin: Positive for wound. Negative for color change and rash.   Allergic/Immunologic: Positive for immunocompromised state.   Neurological: Negative for dizziness, seizures, weakness and headaches.   Psychiatric/Behavioral: Negative for confusion, decreased concentration and dysphoric mood. The patient is not nervous/anxious.    All other systems reviewed and are negative.      Objective:     Vital Signs (Most Recent):  Temp: 99.2 °F (37.3 °C) (02/17/18 1542)  Pulse: 90 (02/17/18 1542)  Resp: 16 (02/17/18 1542)  BP: 135/85 (02/17/18 1542)  SpO2: 95 % (02/17/18 1542) Vital Signs (24h Range):  Temp:  [98.6 °F (37 °C)-101.2 °F (38.4 °C)] 99.2 °F (37.3 °C)  Pulse:  [84-92] 90  Resp:  [16-20] 16  SpO2:  [92 %-96 %] 95 %  BP: (128-137)/(75-85) 135/85     Weight: 60.2 kg (132 lb 11.5 oz)  Body mass index is 21.42 kg/m².    Estimated Creatinine Clearance: 26.8 mL/min (A) (based on SCr of 3.5 mg/dL (H)).    Physical Exam   Constitutional: He is oriented to person, place, and time. He appears well-developed. No distress.   Temporal and distal extremity muscle wasting   HENT:   Head: Normocephalic and atraumatic.   Mouth/Throat: No oropharyngeal exudate.   dylan tube   Eyes:  EOM are normal. Pupils are equal, round, and reactive to light. No scleral icterus.   Neck: Normal range of motion. Neck supple. No JVD present. No thyromegaly present.   Cardiovascular: Normal rate, regular rhythm, normal heart sounds and intact distal pulses.    No murmur heard.  Pulmonary/Chest: Effort normal. No respiratory distress. He has no wheezes. He exhibits no tenderness.   Diminished to RLL   Abdominal: Soft. Bowel sounds are normal. He exhibits ascites. He exhibits no distension. There is tenderness. There is no rebound and no guarding.   Dressing to chevron.  Wd vac removed 1/12/18  Dependent edema present R flank; surrounding erythema   Musculoskeletal: Normal range of motion. He exhibits no tenderness. Edema: 2+ LE edema.   Neurological: He is alert and oriented to person, place, and time. He has normal reflexes.   Skin: Skin is warm and dry. He is not diaphoretic. No erythema.   Psychiatric: His mood appears not anxious.   Nursing note and vitals reviewed.      Significant Labs:   CBC:     Recent Labs  Lab 02/16/18  0436 02/17/18  0343   WBC 2.85* 2.23*   HGB 8.8* 7.8*   HCT 26.8* 23.9*    134*     CMP:     Recent Labs  Lab 02/16/18  0436 02/17/18  0343   * 137   K 4.0 3.8    105   CO2 23 23   GLU 84 81   BUN 23* 13   CREATININE 5.1* 3.5*   CALCIUM 9.5 8.8   PROT 5.6* 5.1*   ALBUMIN 2.0* 1.8*   BILITOT 2.2* 2.1*   ALKPHOS 125 111   AST 25 18   ALT <5* <5*   ANIONGAP 11 9   EGFRNONAA 14.2* 22.4*       Significant Imaging: I have reviewed all pertinent imaging results/findings within the past 24 hours.     CT CAP:   Post surgical changes prior hepatic transplant.  Percutaneous surgical drain with notable decrease in size of fluid collection in lower abdomen.  Small amount of fluid tracking elsewhere within the abdomen and pelvis.  Large volume right pleural fluid, increased in size since prior exam, with associated right lower lobe atelectasis.  Diffuse body wall edema.  Small  pericardial effusion.    Microbiology:  1/11 blood cx: negative  1/11 pleural fluid cx: negative  1/11 ascites cx: negative  1/17 blood cx: negative  1/18 blood cx: negative  1/19 pleural fluid cx: NGTD  1/19 ascites cx: NGTD  2/1 RVP negative  2/7 OR cx: NGTD  2/11 blood cx: negative  2/15 blood cx: negative  2/16 pleural fluid cx: negative

## 2018-03-15 NOTE — ASSESSMENT & PLAN NOTE
- detected at 370 on CMV PCR 2/1.  - case discussed with ID and will hold off on treatment at this time given low WBC.   - undetected CMV PCR 2/8.  - continue Valcyte 200 mg every Mon, Wed, Fri.  - CMV PCR 2/15 negative.  - Per ID, valcyte dose decreased to ppx dose on 2/18.  - CMV PCR 2/22 negative.   - CMV PCR 3/8 pending.  - Repeat scope 3/6 to assess persistent N/V and possible CMV causing these symptoms. Biopsies thus far normal, cmv stains negative.

## 2018-03-15 NOTE — PROGRESS NOTES
EZEKIEL presented to pt's room in order to follow up regarding letter to pt's employer updating them on pt's current medical status. Pt and mother both AAOx4 although pt resting. Pt's mother Elvira aware that employer requesting SW to send a letter with pt's updated medical status and length of time needed before pt can return to work. EZEKIEL inquired with RIMA Vera re: estimated length of time before pt can return work. Geronimo confirmed that at this time, pt would need five months. EZEKIEL able to write letter and email back to pt's  Blank Wilson. EZEKIEL provided pt and mother with copy of this letter for their records. EZEKIEL notified Blank to contact team if she needs any additional updates in the near future. EZEKIEL remains available.     EZEKIEL notified that pt will not be discharged until possibly early next week. EZEKIEL contacted Chano at formerly [facility] ([phone]) and notified him that pt not discharging this week but likely early next week. Chano verbalized understanding. EZEKIEL remains available.

## 2018-03-15 NOTE — ASSESSMENT & PLAN NOTE
- Maintenance IS with cyclosporine --> transitioned to Prograf 2/24 for continued nausea but did not tolerate from neuro standpoint.  PT IS NOT A CANDIDATE FOR TACROLIMUS.    - Transitioned back to cyclosporine 3/1  - D/c cyclosporine, started rapa & stress dose steroids 3/10 for continued confusion  - Mental status now improved, retrial cyclosporine 3/13, d/c rapa 3/13  - Transition back to prednisone taper from stress dose steroids 3/15

## 2018-03-15 NOTE — PLAN OF CARE
Problem: Occupational Therapy Goal  Goal: Occupational Therapy Goal  Goals to be met by:  2 Weeks (3/24/2018)    Patient will increase functional independence with ADLs by performin. Supine to sit with Supervision.--met  2. Sit to Stand transfers with Supervision.   3. Toilet transfer to toilet with Supervision.  4. Grooming while standing with Supervision.  5. UE Dressing with Supervision.- met 3/12/18  6. LE Dressing with Minimal Assistance-met 3/12/18, revised: LE dress with spvn          Outcome: Ongoing (interventions implemented as appropriate)  Pt progressing toward goals  Zulma Renteria, OTR

## 2018-03-15 NOTE — PROGRESS NOTES
[facility]  Liver Transplant  Progress Note    Patient Name: Jhonny Diana  MRN: [id number]  Admission Date: 2018  Hospital Length of Stay: 63 days  Code Status: Full Code  Primary Care Provider: Primary Doctor No  Post-Operative Day: 147    ORGAN:   LIVER  Disease Etiology: Acute Alcoholic Hepatitis  Donor Type:    - Brain Death  CDC High Risk:   No  Donor CMV Status:   Donor CMV Status: Positive  Donor HBcAB:   Negative  Donor HCV Status:   Negative  Whole or Partial: Whole Liver  Biliary Anastomosis: End to End  Arterial Anatomy: Standard  Subjective:     History of Present Illness:  Jhonny Diana is a 26 y/o male with past medical history of alcoholic cirrhosis.  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 Found to have peritonitis in November (WBC 5000, 75% PNM) neg for bile leak. He was treated initially with vanc/cefepime. Repeat cell counts  with some improvement (WBC 1400, 45% PNM). He has undergone multiple paracenteses as well as abscess drainage of perihepatic fluid collections and treated with antimicrobial therapy but no positive cultures. Of note, biliary stricture also identified and ERCP performed on 2017 with sphincterotomy and biliary stent placed. Liver tests still have not normalized despite intervention, bilirubin and AP remain elevated. Other pertinent PMH current wound vac in place 2/2 wound infection, malnutrition requiring TPN for short course and ongoing hypoalbuminemia, and seizure activity while on prograf and has since been switched to cyclosporine without reoccurrence.  He presented to the ER for fever, abdominal pain, and N/V. He reports fever (103) for 1  day prior. Overnight, he developed N/V, reports small amount of green emesis with new left sided pain. He also endorses worsening SOB with exertion. He was scheduled as an outpatient for follow up paracentesis and IR drainage of fluid collection. CXR in ER shows large pleural effusion with subsegmental atelectasis. Infectious work up initiated in ER. His ANC is 900. Broad spectrum antibiotics initiated in ED. At admit, he denied chest pain, palpitations, diarrhea, constipation, back pain, or any sick contacts.    Hospital Course:  Thoracentesis (1.2L off), Paracentesis (1.6L off), and IR drainage of fluid collection 1/11, all fluids negative for infection. ID consulted. Broad spectrum antibiotics d/c'd 1/15. Chronically malnourished with poor PO intake, prealbumin 7. Shane tube placed 1/15 for tube feedings.     ERCP 1/17 with sludge and a biliary stone which was removed and stent exchanged.  Remained with n/v; therefore, TF remained on hold and TPN continued.   Pt with fever s/p ERCP on 1/17 which continued until 1/19.  Vanc/cefepime restarted.  Blood cx 1/17 and 1/18 NGTD.  ID reconsulted.  Fungal markers sent.  CT C/A/P obtained.  With large R pleural effusion and ascites- both drained 1/19 and negative for infx per cell count. Pt afebrile 1/20. Liver biopsy 1/23 - without rejection.  EGD 1/25 - unremarkable for source of GI symptoms.  Resume diet along with other GI recommendations. SHANE tube placed for tube feeds.       2/22: Pt again spiked temp of 101 overnight, now down to 99. Resent Bcx overnight, prelim NGTD. Transitioned to Erta from vanc/zosyn per ID. Planning for tagged WBC scan on Monday. R sided abdominal erythema/dependent edema improving today.  TF have been on hold since 2/20. Will continue to hold today per pt request. Pt working on diet. KUB yesterday with significant stool and gas. Declines colace, miralax, metamucil, but willing to take daily bisacodyl. Pt agreeable to suppository today. If  suppository not successful can give mag citrate through dylan tonight. Will also retrial Marinol tonight as pt reported improvement in appetite previously. Will monitor closely for sedation.     2/23:  2 small BM 2/22.  Tolerated HD.  Hamilton removed.  Will attempt to eat this PM.      2/24:  Pt ate then vomited hamburger 2/23.  Will resume TPN.  D/C atovaquone, transition cyclo to Prograf, check Keppra level, increase Reglan to 0 mg, change twyla to BID for nausea.  Will give pentam today.    2/25:  TPN going well, pt ate 1/2 subway sandwhich without vomiting yesterday, will continue to monitor.  Plan to repeat EGD this week.  Pt with drop in H/h again and appears to be hemolyzing.  LDH elevated, hapto slightly low, LEE pending.  With neutropenia- Neupogen given.    2/26:  Pt with severe back pain s/p HD. Unclear if secondary to neupogen.  Repeat labs stable. Indium scan scheduled for 2/27- injection done today (back pain started prior). CT A/P obtained with fluid collection seen in pelvis.    2/27: Patient spiking fevers, 101. Indium scan, negative results. CT A/P obtained 2/26 with focal/loculated region of fluid in the lower right abdomen measuring approximately 8.7 x 5.5 cm. IR attempted drainage, however, per IR,there was  limited fluid interposed with bowel in the RLQ with no safe pocket seen for drainage.    2/28: Remains with low grade fevers. Neupogen given.     3/1: Patient with stuttering speech, trouble recalling specific words when speaking. Does have hx of seizure (on keppra) previously while on prograf. Switched to prograf 2/24 from cyclosporine. Neuro exam unremarkable. CT head without contrast obtained, unremarkable. In light of these symptoms, prograf d/c, cyclosporine restarted 3/1. Per transplant attending, held off on EGD at this time as concern anesthesia would put patient at further increase risk for seizure activity.     3/4: pt walked in room yesterday.  Had 1 BM.  No appetite still.  Cont TPN.   He reports swelling and back pain today.  He vomited after taking Oxy PO.  Fentanyl 12.5mg IV ordered.  Patient rested well afterward.  Remains afebrile.   ID following.   Cont Ertapenem.  Repeat blood and urine cx given continued malaise and stutter- blood cx prelim NGTD.  UA ok.  Urine cx pending.    Tentative plan for EGD on Monday to assess source of fevers/nausea/abdominal pain w bx for CMV and GVHD.  Will also reassess rather able to perform paracentesis.     3/5: no acute events overnight. Pt still not feeling well and with poor sleep overnight. Cont to have mumbling of speech at times but slightly better than before. Remains with abdominal pain but relieved with PO pain meds. Tolerated HD well today. Will plan for paracentesis and EGD tomorrow to assess for peritonitis and cause of persistent N/V respectively.     3/6: pt continues to have garbled/mumbled speech. EGD performed today and pt received sedation during procedure. Increased lethargy and dysarthria noted post procedure. VS stable. Paracentesis scheduled today as well to r/o peritonitis. Wbc count stable. Afebrile. Continue Ertapenem. HD yesterday and pt tolerated well, 2.5 L removed. Monitor.     3/7: MRI brain yesterday unremarkable. Blood cultures ngtd so far. Neurology consulted. EGD and Paracentesis yesterday. Para with 25 cc removed, wbc 28 segs 3%. Continue TPN. Monitor.    3/8: EUS and ERCP scheduled tomorrow. NPO after midnight. T bili continues trending up daily. Last ERCP on 2/19 with two stents placed. Ertapenem dc'd 3/7. Pt spiking temps today, tmax 100.9. Cefepime/vanc started. Oxycodone decreased to 5 mg q6h prn. Dc marinol and reglan for now. He continues to slur/stutter and have jerky movements throughout the day. Neurology following. EEG scheduled today. Monitor.     3/9: EUS/ERCP completed with choledoliathiasis.  Fever over night.  May need LP in future if mental status does not improve.      3/10- speech improved, neuro  suggesting lumbar puncture - schedule for Monday    3/11 - more lethargic, tmax 100.9, plan for lumbar puncture in am.    3/12 - mental status greatly improved, walking halls with PT, will hold off on LP for now.    3/13 - mental status remains stable, complains of pain to lower abdomen, bowel movement with suppository over night, KUB with non specific bowel gas pattern, would like to increase pain regimen - spoke with patient and mother this was counter intuitive as narcotics slow down the bowels, patient states understanding - will take colace religiously with increased pain medicine.  Also complaining of sob - chest xray and dose of diamox    3/14: Mentation good.  Continue cyclosporine.   Tolerated HD.  Decrease hydrocortisone dose once pt has cyclosporine level.  Transjug bx shows mild cholestatic hepatitis --> unable to cx since bx performed via EUS.  Will plan to obtain culture with next bx.    3/15: Pt feeling well.  Still reports abd pain which is slightly improved.  He feels eating is going well- calorie count initiated --> goal to decreased then D/c TPN.  Restart of TF discussed with pt and mom but will allow for a 48 hour calorie count.  Hydrocortisone transitioned back to prednisone taper.  Pt with 7 bands --> will continue to monitor closely for infection.    Scheduled Meds:   bisacodyl  10 mg Oral Daily    ceFEPime (MAXIPIME) IVPB  1 g Intravenous Q12H    cycloSPORINE modified  100 mg Oral BID    docusate sodium  100 mg Oral BID    epoetin maurisio (PROCRIT) injection  10,000 Units Intravenous Every Mon, Wed, Fri    ergocalciferol  50,000 Units Oral Q7 Days    fat emulsion 20%  250 mL Intravenous Once    heparin (porcine)  5,000 Units Subcutaneous Q8H    custom IVPB builder   Intravenous Q24H    levetiracetam oral soln  500 mg Oral BID    levothyroxine  75 mcg Oral Before breakfast    metronidazole  500 mg Intravenous Q8H    multivitamin  1 tablet Oral Daily    omeprazole  40 mg Oral QAM     polyethylene glycol  17 g Oral BID    [START ON 3/16/2018] predniSONE  20 mg Oral Daily    psyllium husk (aspartame)  3.4 g Oral BID    ursodiol  300 mg Oral BID     Continuous Infusions:   TPN ADULT CENTRAL LINE CUSTOM 48 mL/hr at 03/14/18 2210    TPN ADULT CENTRAL LINE CUSTOM       PRN Meds:sodium chloride, sodium chloride, sodium chloride 0.9%, sodium chloride 0.9%, sodium chloride 0.9%, acetaminophen, albuterol-ipratropium 2.5mg-0.5mg/3mL, bisacodyl, dextrose 50%, dextrose 50%, diphenhydrAMINE-zinc acetate 1-0.1%, glucagon (human recombinant), glucose, glucose, heparin (porcine), naloxone, ondansetron, ondansetron, oxyCODONE, oxyCODONE, prochlorperazine, simethicone, sodium chloride 0.9%, sodium chloride 0.9%    Review of Systems   Constitutional: Positive for activity change, appetite change and fatigue.   Gastrointestinal: Positive for abdominal distention, abdominal pain and constipation.   Genitourinary: Positive for decreased urine volume.   Allergic/Immunologic: Positive for immunocompromised state.     Objective:     Vital Signs (Most Recent):  Temp: 99.4 °F (37.4 °C) (03/15/18 1216)  Pulse: 77 (03/15/18 1216)  Resp: 18 (03/15/18 1216)  BP: (!) 140/82 (03/15/18 1216)  SpO2: 96 % (03/15/18 1216) Vital Signs (24h Range):  Temp:  [98.1 °F (36.7 °C)-99.4 °F (37.4 °C)] 99.4 °F (37.4 °C)  Pulse:  [68-89] 77  Resp:  [16-20] 18  SpO2:  [95 %-100 %] 96 %  BP: (127-142)/(71-86) 140/82     Weight: 73.6 kg (162 lb 4.1 oz)  Body mass index is 26.19 kg/m².    Intake/Output - Last 3 Shifts       03/13 0700 - 03/14 0659 03/14 0700 - 03/15 0659 03/15 0700 - 03/16 0659    P.O. 660 955     I.V. (mL/kg) 0 (0)      Blood       Other 0 600     IV Piggyback 350 550     .4 1362.9     Total Intake(mL/kg) 1472.4 (19.9) 3467.9 (47.1)     Urine (mL/kg/hr) 125 (0.1) 0 (0)     Emesis/NG output 0 (0) 0 (0)     Other 0 (0) 4600 (2.6)     Stool 0 (0) 0 (0)     Blood 0 (0) 0 (0)     Total Output 125 4600      Net +1347.4  -1132.1             Urine Occurrence 0 x 1 x     Stool Occurrence 1 x 0 x     Emesis Occurrence 0 x 0 x           Physical Exam   Constitutional: He is oriented to person, place, and time. He appears well-developed.   Hand and temporal muscle wasting   HENT:   Head: Normocephalic.   Eyes: Pupils are equal, round, and reactive to light. No scleral icterus.   Cardiovascular: Normal rate, regular rhythm, normal heart sounds and intact distal pulses.    Pulmonary/Chest: Effort normal. He has rales.   L base, decreased BS R base   Abdominal: Bowel sounds are normal. He exhibits distension. There is no tenderness.   Pain with palpation   Musculoskeletal: Normal range of motion. He exhibits edema.   Neurological: He is alert and oriented to person, place, and time.   Skin: Skin is warm and dry.   Psychiatric: He has a normal mood and affect. His behavior is normal. Judgment and thought content normal.   Nursing note and vitals reviewed.      Laboratory:  Immunosuppressants         Stop Route Frequency     cycloSPORINE modified (NEORAL) capsule 100 mg      -- Oral 2 times daily     cycloSPORINE modified (NEORAL) 25 MG capsule      03/09 1714       cycloSPORINE modified (NEORAL) 100 MG capsule      03/09 1714       cycloSPORINE modified (NEORAL) 100 MG capsule      03/09 0514       cycloSPORINE modified (NEORAL) 25 MG capsule      03/09 0514          CBC:     Recent Labs  Lab 03/15/18  0342   WBC 6.37   RBC 2.66*   HGB 8.3*   HCT 26.0*      MCV 98   MCH 31.2*   MCHC 31.9*     CMP:     Recent Labs  Lab 03/15/18  0342   *   CALCIUM 8.4*   ALBUMIN 1.9*   PROT 5.6*      K 3.6   CO2 26      BUN 37*   CREATININE 2.6*   ALKPHOS 104   ALT 5*   AST 25   BILITOT 1.4*     Coagulation:     Recent Labs  Lab 03/12/18  1004   INR 1.1     Labs within the past 24 hours have been reviewed.    Diagnostic Results:  None    Assessment/Plan:     Liver transplanted    - Post op course complicated by fevers and  hyperbilirubinemia.  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  - Liver US 1/8 showed 3.9 cm complex fluid collection anterior to right lobe and moderate nonspecific complex ascites inferior to transplant.   - IR placed drain 1/11/18, cell count negative for infection.  - PBS consulted. ERCP 1/17 with stone and sludge. Pt placed on Actigall - treated with 10 day course of abx.   - Bili elevated - liver u/s 1/28.  - Liver biopsy 1/23 without rejection.  - consulted PBS for ERCP - Choledocholithiasis was found along with sludge, stents placed 1/30.   - ERCP 2/19, overall unremarkable findings. Did place new larger stents. Will need repeat ERCP in 10 weeks. Bilirubin improving.  - Liver US reviewed w mod ascites.  Stent in CBD.    - Paracentesis 3/6 negative for infection, wbc 28 segs 3%.  - ERCP, EUS biopsy 3/9/18 - no rejection, mild cholestatic hepatitis seen.        Long-term use of immunosuppressant medication    - Maintenance IS with cyclosporine --> transitioned to Prograf 2/24 for continued nausea but did not tolerate from neuro standpoint.  PT IS NOT A CANDIDATE FOR TACROLIMUS.    - Transitioned back to cyclosporine 3/1  - D/c cyclosporine, started rapa & stress dose steroids 3/10 for continued confusion  - Mental status now improved, retrial cyclosporine 3/13, d/c rapa 3/13  - Transition back to prednisone taper from stress dose steroids 3/15        Prophylactic immunotherapy    - See long term use of immunosuppression.         At risk for opportunistic infections    - CMV detected 2/1 now on tx dose valcyte. CMV PCR 2/15 not detected. CMV PCR 2/22, negative.  - Repeat CMV PCR 3/8 negative.  CMV PCR weekly.  - Pentam given 2/24.            Vitamin D deficiency    - Continue ergo 50K weekly.           Kidney transplant candidate              Other cytomegaloviral diseases    - detected at 370 on CMV PCR 2/1.  - case discussed with ID and will hold off on treatment at this time given low  WBC.   - undetected CMV PCR 2/8.  - continue Valcyte 200 mg every Mon, Wed, Fri.  - CMV PCR 2/15 negative.  - Per ID, valcyte dose decreased to ppx dose on 2/18.  - CMV PCR 2/22 negative.   - CMV PCR 3/8 pending.  - Repeat scope 3/6 to assess persistent N/V and possible CMV causing these symptoms. Biopsies thus far normal, cmv stains negative.        Fever    - Started on vanc/cefepime for ERCP. Now transitioned to vanc/zosyn.  - Blood cultures sent, prelim NGTD. Repeat blood cultures and chest xray today 2/10.  - thoracentesis 2/1 - fluid negative for infection.  - atelectasis noted on ct scan - start breathing tx & CPT.  - now cmv positive - started treatment 2/5/18.  - taken to OR 2/7 for exp lap - fluid collections drained, cultures pending.  - Patient with low grade fever 2/15, 100.3.  - CT A/P 2/15 with no evidence of IA fluid infection. Did show R pleural effusion s/p thoracentesis, cell count negative for infection, cx pending.  - Repeat blood cx 2/15 prelim NGTD, CMV PCR 2/15 negative   Complaining of abdominal pain. Is tender in RUQ.  - Remains with fevers.  - Bilirubin remains elevated. Concerning for cholangitis. Antibiotics broadened 2/17. ID re consulted.   - ERCP  2/19, overall unremarkable findings. Did place new larger stents. Will need repeat ERCP in 10 weeks. Bilirubin is stable post ERCP.  -Tmax 101 2/21 PM again ID following. Plan to transition vanc/zosyn to ertapenem 2/22 as no improvement noted.   - Erta started 2/22.  - CT A/P obtained 2/26 with focal/loculated region of fluid in the lower right abdomen measuring approximately 8.7 x 5.5 cm.   - IR attempted drainage on fluid collection on 2/27, however, per IR,there was limited fluid interposed with bowel in the RLQ with no safe pocket seen for drainage.  -Tagged WBC scan 2/27, negative results.  - Remains with low grade fevers.   - Per transplant attending, will hold off on EGD at this time as concern anesthesia will put patient at further  increase risk for seizure activity. Will plan for potenial EGD on Monday. Discussed with GI, appreciate their assistance.  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 EBV PCR negative. Adenovirus pending. Repeat CMV PCR negative.    - Back pain and abdominal pain persists. Last BM 3/5 with brown bomb.   - ID following. Cont Ertapenem dc'd 3/7 - due to AMS   - Tmax 101.1 overnight 3/9 - Cefepime/vanc/flagyl.  Monitor.   - Remains afebrile x several days  - Bx with cholestatic hepatitis --> couldn't cx secondary to EUS contamination --> will cx next bx  - Plan to continue abx x 14 days to treat cholangitis --> stop date 3/23.        Constipation    - Enema ordered 1/20 and 1/21 with BM.  - Encourage ambulation.  - Decrease narcotics.  - D/c metamucil and miralax bid as makes patient nauseated.  - Increase reglan qid.  - Small bowel follow through with normal findings.   - Schedule bisacodyl for bowel regimen.   - KUB 2/21 with significant amounts of stool and gas. Pt declines miralax, metamucil, colace.   - encouraged suppository.  - Improved on KUB 2/23.  - Pt moving bowels now but will have to keep aggressive bowel regimen on board.   - Brown bomb 3/5 with BM x 1.  - KUB 3/8 with findings concerning for a partial bowel obstruction.  - KUB 3/13 with nonspecific bowel gas pattern, encouraged taking colace, enema ordered with result        Severe protein-calorie malnutrition    - Poor PO intake since transplant requiring short course of TPN during previous hospital stay.   - albumin remains decreased but appetite slowly improving per pt.   - Dietary consulted. Will need to closely monitor PO intake.   - supplements also ordered.   - SHANE with tube feeding start 1/16. Stopped 1/17/18, was not tolerating. Shane removed 1/20.  - prealbumin 7 on 1/15.  - TPN started 1/16/18.   - Pt able to eat minimally 1/21 and 1/22 which is an improvement.  - GI consulted - EGD reviewed, increased ppi bid, miralax bid, and  metamucil bid, decreased narcotics.  - continue reglan, placed SHANE tube with tube feeds - transitioned to nightly feeds. Not tolerating well due to nausea.   - small bowel follow through with normal findings.   - consulted gi for possible gj tube placement, holding off on this for now as with ongoing fevers.  - Dietary changed tube feeds back to 24 hours from nightly 2/19 as not eating enough during the day to meet caloric needs. TF on hold since 2/20.  - retrial marinol 2/22 as pt reports that's the only thing helping his appetite, in the evening 2/2 concern for possible drowsiness  - Pt then developed persistent N/V again and TPN started 2/24. Cont TPN for now.   - Pt able to eat 1/4 sandwich without vomiting 2/25.  Not been able to eat anything more than that in 48 hours per pt and mother.    - EGD 3/6 with normal esophagus    - congested, erythematous and nodular mucosa in the stomach.    - biliary stent in the duodenum    - one duodenal ulcer with a clean ulcer base (Emir Class III).    - biopsies taken in the duodenal bulb and second and third portion of the duodenum.  - Hold Reglan for neuro symptoms.  - Continue TPN, tolerating diet today  - Calorie count initiated x 48 hours to reassess TPN and assess need for TF        Nausea and vomiting    - h/o constipation, reports having regular BM's with bowel regimen.  - vomiting episode x 1 at home. Anti-emetics ordered PRN.  - KUB repeated 1/14/18 given increased abdominal pain- mild, generalized bowel distention suggesting ileus.  Diet changed to clears as tolerated for bowel rest .  - SHANE tube placed 1/16 for tube feeds. Not tolerating tube feeds, stopped 1/17/18.  - D/C shane 1/19, now in place as of 1/25.  - chronic constipation, Cont bowel regimen.   - See protein calorie malnutrition.  - TF have been on hold since 2/20 for N/V. shane d/c'd 2/23 as not tolerating TF  - 2/24 changes: Increased Reglan to 10 mg, D/C atovaquone, transitioned cyclo to Prograf,  change twyla to BID, check Keppra level  - GI consulted. EGD 3/6 as with continued nausea/vomiting with po intake. Appreciate GI recs.    - Cont to have nausea and vomiting w medications and eating.  Cont TPN.     - holding reglan 3/8 due to neurologic symptoms.            Recurrent pleural effusion on right    - CXR in ER with large right pleural effusion with subjective c/o worsening SOB.  - thoracentesis completed- 1200 ml removed.  SOB with significant improvement.   - Cell count reviewed and negative for infection.   - resp even and non labored.  O2 sat 94-98%.  - Repeat thora 1/19 negative for infection per cell count.  - Reaccumulating fluid on xray 1/21.  - thoracentesis 1/31 - 1.4L removed, negative for infection.  - Reaccumulating fluid again on xray 2/10.   - Thoracentesis performed  2/16 with 1500 cc off, cell count neg for infection, cx no growth to date.   - CT A/P 2/26 with large R pleural effusion, may need repeat thora if fevers reoccur.    - complaining of sob, obtain chest xray, trial dose of diamox 3/14 with minimal UOP  - chest xray with large pleural effusion --> may need thora soon  - HD last 3/14        Biliary stricture of transplanted liver    - Tbili with increase prompting ERCP 1/30 with stones and sludge, stents placed.  - Repeat ERCP 2/19 as was having recurrent fevers with bilirubin stuck at 2.0-2.2 with overall unremarkable findings seen. Did place new larger stents.   - US 3/2 stent in CBD  - ERCP/EUS 3/9 with mental stent placed in bile duct, stones removed        Anemia of chronic disease    - 1 U PRBC 3/12  - Monitor daily labs        Delayed surgical wound healing    - Wd vac removed 1/12/18.    - Wound healing well.   - Changing dressing q Tuesday, last 3/13          Seizure    - Patient with stuttering speech, trouble recalling specific words when speaking. Does have hx of seizure (on keppra) previously while on prograf. Switched to prograf over the weekend from cyclosporine.  Neuro exam unremarkable.   - CT head without contrast obtained, unremarkable.   - In light of these symptoms, prograf d/c, restart cyclosporine 3/1.  - remains w/o seizure. Cont to have stutter/garbled speech/difficulty finding words.  Worse post EGD as sedation was given.  - MRI 3/6 unremarkable.  - EEG completed 3/8 - mild, generalized, non-specific cerebral dysfunction, no epileptiform activity  - monitor closely. VSS.          Acute renal failure with tubular necrosis    - HD resumed on previous admission. Now anuric and dialyzes M-W-F.  - Nephrology following.    - Goal is for kidney txp in future- currently not a candidate due to health status.            VTE Risk Mitigation         Ordered     heparin (porcine) injection 1,000 Units  As needed (PRN)     Route:  Intra-Catheter        03/12/18 1134     heparin (porcine) injection 5,000 Units  Every 8 hours     Route:  Subcutaneous        03/08/18 1622     Medium Risk of VTE  Once      01/11/18 0351     Place sequential compression device  Until discontinued      01/11/18 0351          The patients clinical status was discussed at multidisplinary rounds, involving transplant surgery, transplant medicine, pharmacy, nursing, nutrition, and social work    Discharge Planning:  Monitor kidney function --> may need outpt HD  Monitor HH needs vs rehab for deconditioned status      Geronimo Vera PA-C  Liver Transplant  [facility]

## 2018-03-15 NOTE — ASSESSMENT & PLAN NOTE
- CXR in ER with large right pleural effusion with subjective c/o worsening SOB.  - thoracentesis completed- 1200 ml removed.  SOB with significant improvement.   - Cell count reviewed and negative for infection.   - resp even and non labored.  O2 sat 94-98%.  - Repeat thora 1/19 negative for infection per cell count.  - Reaccumulating fluid on xray 1/21.  - thoracentesis 1/31 - 1.4L removed, negative for infection.  - Reaccumulating fluid again on xray 2/10.   - Thoracentesis performed  2/16 with 1500 cc off, cell count neg for infection, cx no growth to date.   - CT A/P 2/26 with large R pleural effusion, may need repeat thora if fevers reoccur.    - complaining of sob, obtain chest xray, trial dose of diamox 3/14 with minimal UOP  - chest xray with large pleural effusion --> may need thora soon  - HD last 3/14

## 2018-03-15 NOTE — TELEPHONE ENCOUNTER
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

## 2018-03-15 NOTE — PROGRESS NOTES
[facility]  Adult Nutrition  Progress Note    SUMMARY       Recommendations    Recommendation/Intervention: Starting calorie count today.     Recommend modifying feeding method to enteral feeding - continue to endorse J-tube placement for bolus home TF. Consider restarting Marinol. Due to previous lack of PO intake at home after discharge, believe patient would greatly benefit from having enteral feeding option at home to supplement oral intake if/when unable to tolerate PO diet.    With or without J-Tube, recommend enteral feedings and d/c TPN. Noted .      Recomend Peptamen 1.5 with Prebio to be started at 10mL/hr and increased by 10mL q 4hrs until goal rate of 55mL/hr is reached. Hold for residuals > 500mL.     RD following.      Goals: 1. 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, complicate post-op w/ LAURA ongoing, chronic malnutrition, cirrhosis 2' EtOH  Interdisciplinary Rounds: did not attend  General Information Comments: pt ate some yesterday and the day before, as of 11am today had 1/2 apple and a couple bites of eggs, TPN continues, team trying to send pt home early next week - do not believe patient will eat enough   Nutrition Discharge Planning: Unable to determine at this time    Nutrition Risk Screen    Nutrition Risk Screen: large or nonhealing wound, burn or pressure ulcer    Nutrition/Diet History    Patient Reported Diet/Restrictions/Preferences: low salt  Typical Food/Fluid Intake: eats very little at this time  Food Preferences: No cultural or Sabianism food preferences noted  Do you have any cultural, spiritual, Sabianism conflicts, given your current situation?: none  (Retired) Meal/Snack Patterns: pt likes to  "cook, when feeling well will cook and then only eat bites  Supplemental Drinks or Food Habits:  (protein powders sometimes)  (RETIRED) Functional Status: able to prepare meals, able to purchase food, ambulatory  Factors Affecting Nutritional Intake: altered gastrointestinal function, abdominal distention, decreased appetite, nausea/vomiting    Anthropometrics    Temp: 99.4 °F (37.4 °C)  Height Method: Stated  Height: 5' 6" (167.6 cm)  Height (inches): 66 in  Weight Method: Bed Scale  Weight: 73.6 kg (162 lb 4.1 oz)  Weight (lb): 162.26 lb  Ideal Body Weight (IBW), Male: 142 lb  % Ideal Body Weight, Male (lb): 112.68 lb  BMI (Calculated): 25.9  BMI Grade: 25 - 29.9 - overweight  Weight Loss: unintentional  Usual Body Weight (UBW), k kg (2017 )  % Usual Body Weight: 86.71  % Weight Change From Usual Weight: -13.47 %    Anthropometrics Special Consideration         Lab/Procedures/Meds    (RETIRED) Diagnostic Test/Procedure Review: reviewed, pertinent  Pertinent Labs Reviewed: reviewed  Pertinent Labs Comments: BUN 37, GFR 37.1, glu 115, P 1.6, PAB 10,   Pertinent Medications Reviewed: reviewed  Pertinent Medications Comments: ergocalciferol, docusate, bisacodyl, MVI, psyllium    Physical Findings/Assessment    Overall Physical Appearance: generalized wasting, loss of muscle mass, loss of subcutaneous fat, weak  Tubes:  (-)  Oral/Mouth Cavity: WDL  Skin: intact    Estimated/Assessed Needs    Weight Used For Calorie Calculations: 62.3 kg (137 lb 5.6 oz)  Height: 5' 6" (167.6 cm)  Energy Calorie Requirements (kcal): 0596-0196 (30-35 kcal/kg)  Energy Need Method: Kcal/kg  20 kcal/kg (kcal): 1246  25 kcal/kg (kcal): 1557.5  30 kcal/kg (kcal): 1869  35 kcal/kg (kcal): 2180.5  40 kcal/kg (kcal): 2492.01  45 kcal/kg (kcal): 2803.51  50 kcal/kg (kcal): 3115.01  RMR (Andrews-St. Jeor Equation): 1535.75  Protein Requirements: 81-94g (1.3-1.5 g/kg)  Weight Used For Protein Calculations: 62.3 kg (137 lb 5.6 " oz)  0.6 gm Protein (gm): 37.46  0.7 gm Protein (gm): 43.7  0.8 gm Protein (gm): 49.94  0.9 gm Protein (gm): 56.19  1.0 gm Protein (gm): 62.43  1.1 gm Protein (gm): 68.67  1.2 gm Protein (gm): 74.92  1.3 gm Protein (gm): 81.16  1.4 gm Protein (gm): 87.4  1.5 gm Protein (gm): 93.65  1.6 gm Protein (gm): 99.89  1.7 gm Protein (gm): 106.13  1.8 gm Protein (gm): 112.37  1.9 gm Protein (gm): 118.62  2.0 gm Protein (gm): 124.86  2.1 gm Protein (gm): 131.1  2.2 gm Protein (gm): 137.35  2.3 gm Protein (gm): 143.59  2.4 gm Protein (gm): 149.83  2.5 gm Protein (gm): 156.08  Fluid Requirements (mL): 1mL/kcal  Fluid Need Method: RDA Method  RDA Method (mL): 1869       Nutrition Prescription Ordered    Current Diet Order: Regular  Nutrition Order Comments: -  Current Nutrition Support Formula Ordered:  (Custom TPN 92g AA / 182g dex plus IV lipids)  Current Nutrition Support Rate Ordered: 48 (ml)  Current Nutrition Support Frequency Ordered: mL/hr  Oral Nutrition Supplement: Boost Breeze    Evaluation of Received Nutrient/Fluid Intake    Enteral Calories (kcal): 0  Enteral Protein (gm): 0  Enteral (Free Water) Fluid (mL): 0  Parenteral Calories (kcal): 987  Parenteral Protein (gm): 92  Parenteral Fluid (mL): 1152  Lipid Calories (kcals): 500 kcals  GIR (Glucose Infusion Rate) (mg/kg/min): 1.89 mg/kg/min  Total Calories (kcal): 0  Total Calories (kcal/kg): 1487  % Kcal Needs: 79  % Protein Needs: 100  I/O: +5.7L since admit  Energy Calories Required: not meeting needs  Protein Required: meeting needs  Fluid Required: meeting needs  Comments: LBM 3/13  Tolerance: tolerating  % Intake of Estimated Energy Needs: 75 - 100 %  % Meal Intake: 0 - 25 %    Nutrition Risk    Level of Risk/Frequency of Follow-up:  (2x/week)     Assessment and Plan    No new Assessment & Plan notes have been filed under this hospital service since the last note was generated.  Service: Nutrition       Monitor and Evaluation    Food and Nutrient Intake: energy  intake, food and beverage intake  Food and Nutrient Adminstration: diet order  Knowledge/Beliefs/Attitudes: food and nutrition knowledge/skill  Physical Activity and Function: nutrition-related ADLs and IADLs  Anthropometric Measurements: weight, weight change, body mass index  Biochemical Data, Medical Tests and Procedures: electrolyte and renal panel, lipid profile, gastrointestinal profile, glucose/endocrine profile, inflammatory profile  Nutrition-Focused Physical Findings: overall appearance     Nutrition Follow-Up    RD Follow-up?: Yes

## 2018-03-15 NOTE — PT/OT/SLP PROGRESS
"Physical Therapy Treatment    Patient Name:  Jhonny Diana   MRN:  [id number]    Recommendations:     Discharge Recommendations:  outpatient PT   Discharge Equipment Recommendations: bath bench   Barriers to discharge: None    Assessment:     Jhonny Diana is a 27 y.o. male admitted with a medical diagnosis of Fever.  He presents with the following impairments/functional limitations:  weakness, impaired endurance, impaired functional mobilty, gait instability, impaired balance, decreased lower extremity function, decreased upper extremity function, impaired cardiopulmonary response to activity.  Pt has presented as in/out of consciousness, at times non-responsive, though today was able to follow all directions appropriately, more so than in previous tx sessions.  However, initially, pt was very difficult to arouse from sleep, minimal response to verbal, physical, and painful stimuli.  Pt stated that he was too exhausted following previous PT treatment session, therefore reduced gait training to approx 15 min and increased sitting/standing therex.  New recommendation for pt to receive skilled PT services in the outpatient setting upon discharge.  Pt will benefit from acute skilled PT services to address these deficits and reach maximum level of function.      Recent Surgery: Procedure(s) (LRB):  ERCP (N/A)  ULTRASOUND-ENDOSCOPIC-UPPER (N/A) 6 Days Post-Op    Plan:     During this hospitalization, patient to be seen 4 x/week to address the above listed problems via gait training, therapeutic activities, therapeutic exercises, neuromuscular re-education  · Plan of Care Expires:  04/05/18   Plan of Care Reviewed with: patient    Subjective     Communicated with nursing prior to session.  Patient found in supine upon PT entry to room, agreeable to treatment.      "I'll be ok."    Chief Complaint: Exhaustion following previous tx session  Patient comments/goals: To go home and have stability  Pain/Comfort:  · Pain Rating 1: " 7/10  · Location 1: abdomen  · Pain Addressed 1: Pre-medicate for activity, Nurse notified    Patients cultural, spiritual, Amish conflicts given the current situation: no    Objective:     Patient found with: central line, telemetry     General Precautions: Standard, fall   Orthopedic Precautions:N/A   Braces: N/A     Functional Mobility:    · Bed Mobility:     · Scooting: independence  · Supine to Sit: independence    · Transfers:     · Sit to Stand:  independence with no AD  · Bed to Chair: contact guard assistance with  rolling walker  using  Stand Pivot    · Gait: Pt amb 550', CGA, RW, with improved foot clearance    · Balance: CGA: dynamic standing balance with AD    AM-PAC 6 CLICK MOBILITY  Turning over in bed (including adjusting bedclothes, sheets and blankets)?: 4  Sitting down on and standing up from a chair with arms (e.g., wheelchair, bedside commode, etc.): 4  Moving from lying on back to sitting on the side of the bed?: 4  Moving to and from a bed to a chair (including a wheelchair)?: 3  Need to walk in hospital room?: 3  Climbing 3-5 steps with a railing?: 3  Total Score: 21       Therapeutic Activities and Exercises:   Whiteboard updated  Ankle pumps: 20 reps, in sit   LAQs: 20 reps each LE perf individually, in sit  Hip abd/add: 20 reps each LE perf individually, in sit  Hip flex: 40 reps, alt LEs, in sit  Sit-ups: 20 reps, in reclined sitting posture, with A  10x sit<>stand: 1 min 21.87 sec  6MWT: Pt amb 466' = 142.04m, CGA, RW  Gait speed: 0.39 m/s  Biceps curls: 16 reps, each UE perf individually, with 2 liter coke bottle  Sh presses: 15 reps, each UE perf individually, with 2 liter coke bottle  Resisted scap retraction: 15 reps, with green t-band, in sit    Patient left on couch with all lines intact, call button in reach and mother present.    GOALS:    Physical Therapy Goals        Problem: Physical Therapy Goal    Goal Priority Disciplines Outcome Goal Variances Interventions   Physical  Therapy Goal     PT/OT, PT Ongoing (interventions implemented as appropriate)     Description:  Goals to be met by: 3/19/18     Patient will increase functional independence with mobility by performin. Supine to sit with Modified Jay. - GOAL MET  2. Sit to stand transfer with Minimal Assistance. - GOAL MET  3. Bed to chair transfer with Contact Guard Assistance using Rolling Walker. - GOAL MET  4. Gait  x 150 feet with Contact Guard Assistance using Rolling Walker. - GOAL MET    5. Sit to supine with Modified Jay.  6. Lower extremity exercise program x 20 reps per handout, with assistance as needed.  7. Pt to perf 10x sit<>stand: 1 min 18 sec, to demonstrate improvements in B LE mm strength.  8. Pt to perf 6MWT: amb 566', to demonstrate a clinically significant improvement in aerobic capacity.    9. Pt to amb 150' with DOC YANCEY.                       Time Tracking:     PT Received On: 03/15/18  PT Start Time: 1202     PT Stop Time: 1243  PT Total Time (min): 41 min     Billable Minutes: Gait Training 16 and Therapeutic Exercise 25    Treatment Type: Treatment  PT/PTA: PT           Tory Amato, PT  03/15/2018

## 2018-03-15 NOTE — SUBJECTIVE & OBJECTIVE
Scheduled Meds:   bisacodyl  10 mg Oral Daily    ceFEPime (MAXIPIME) IVPB  1 g Intravenous Q12H    cycloSPORINE modified  100 mg Oral BID    docusate sodium  100 mg Oral BID    epoetin maurisio (PROCRIT) injection  10,000 Units Intravenous Every Mon, Wed, Fri    ergocalciferol  50,000 Units Oral Q7 Days    fat emulsion 20%  250 mL Intravenous Once    heparin (porcine)  5,000 Units Subcutaneous Q8H    custom IVPB builder   Intravenous Q24H    levetiracetam oral soln  500 mg Oral BID    levothyroxine  75 mcg Oral Before breakfast    metronidazole  500 mg Intravenous Q8H    multivitamin  1 tablet Oral Daily    omeprazole  40 mg Oral QAM    polyethylene glycol  17 g Oral BID    [START ON 3/16/2018] predniSONE  20 mg Oral Daily    psyllium husk (aspartame)  3.4 g Oral BID    ursodiol  300 mg Oral BID     Continuous Infusions:   TPN ADULT CENTRAL LINE CUSTOM 48 mL/hr at 03/14/18 2210    TPN ADULT CENTRAL LINE CUSTOM       PRN Meds:sodium chloride, sodium chloride, sodium chloride 0.9%, sodium chloride 0.9%, sodium chloride 0.9%, acetaminophen, albuterol-ipratropium 2.5mg-0.5mg/3mL, bisacodyl, dextrose 50%, dextrose 50%, diphenhydrAMINE-zinc acetate 1-0.1%, glucagon (human recombinant), glucose, glucose, heparin (porcine), naloxone, ondansetron, ondansetron, oxyCODONE, oxyCODONE, prochlorperazine, simethicone, sodium chloride 0.9%, sodium chloride 0.9%    Review of Systems   Constitutional: Positive for activity change, appetite change and fatigue.   Gastrointestinal: Positive for abdominal distention, abdominal pain and constipation.   Genitourinary: Positive for decreased urine volume.   Allergic/Immunologic: Positive for immunocompromised state.     Objective:     Vital Signs (Most Recent):  Temp: 99.4 °F (37.4 °C) (03/15/18 1216)  Pulse: 77 (03/15/18 1216)  Resp: 18 (03/15/18 1216)  BP: (!) 140/82 (03/15/18 1216)  SpO2: 96 % (03/15/18 1216) Vital Signs (24h Range):  Temp:  [98.1 °F (36.7 °C)-99.4 °F  (37.4 °C)] 99.4 °F (37.4 °C)  Pulse:  [68-89] 77  Resp:  [16-20] 18  SpO2:  [95 %-100 %] 96 %  BP: (127-142)/(71-86) 140/82     Weight: 73.6 kg (162 lb 4.1 oz)  Body mass index is 26.19 kg/m².    Intake/Output - Last 3 Shifts       03/13 0700 - 03/14 0659 03/14 0700 - 03/15 0659 03/15 0700 - 03/16 0659    P.O. 660 955     I.V. (mL/kg) 0 (0)      Blood       Other 0 600     IV Piggyback 350 550     .4 1362.9     Total Intake(mL/kg) 1472.4 (19.9) 3467.9 (47.1)     Urine (mL/kg/hr) 125 (0.1) 0 (0)     Emesis/NG output 0 (0) 0 (0)     Other 0 (0) 4600 (2.6)     Stool 0 (0) 0 (0)     Blood 0 (0) 0 (0)     Total Output 125 4600      Net +1347.4 -1132.1             Urine Occurrence 0 x 1 x     Stool Occurrence 1 x 0 x     Emesis Occurrence 0 x 0 x           Physical Exam   Constitutional: He is oriented to person, place, and time. He appears well-developed.   Hand and temporal muscle wasting   HENT:   Head: Normocephalic.   Eyes: Pupils are equal, round, and reactive to light. No scleral icterus.   Cardiovascular: Normal rate, regular rhythm, normal heart sounds and intact distal pulses.    Pulmonary/Chest: Effort normal. He has rales.   L base, decreased BS R base   Abdominal: Bowel sounds are normal. He exhibits distension. There is no tenderness.   Pain with palpation   Musculoskeletal: Normal range of motion. He exhibits edema.   Neurological: He is alert and oriented to person, place, and time.   Skin: Skin is warm and dry.   Psychiatric: He has a normal mood and affect. His behavior is normal. Judgment and thought content normal.   Nursing note and vitals reviewed.      Laboratory:  Immunosuppressants         Stop Route Frequency     cycloSPORINE modified (NEORAL) capsule 100 mg      -- Oral 2 times daily     cycloSPORINE modified (NEORAL) 25 MG capsule      03/09 1714       cycloSPORINE modified (NEORAL) 100 MG capsule      03/09 1714       cycloSPORINE modified (NEORAL) 100 MG capsule      03/09 7347        cycloSPORINE modified (NEORAL) 25 MG capsule      03/09 0514          CBC:     Recent Labs  Lab 03/15/18  0342   WBC 6.37   RBC 2.66*   HGB 8.3*   HCT 26.0*      MCV 98   MCH 31.2*   MCHC 31.9*     CMP:     Recent Labs  Lab 03/15/18  0342   *   CALCIUM 8.4*   ALBUMIN 1.9*   PROT 5.6*      K 3.6   CO2 26      BUN 37*   CREATININE 2.6*   ALKPHOS 104   ALT 5*   AST 25   BILITOT 1.4*     Coagulation:     Recent Labs  Lab 03/12/18  1004   INR 1.1     Labs within the past 24 hours have been reviewed.    Diagnostic Results:  None

## 2018-03-15 NOTE — ASSESSMENT & PLAN NOTE
- Enema ordered 1/20 and 1/21 with BM.  - Encourage ambulation.  - Decrease narcotics.  - D/c metamucil and miralax bid as makes patient nauseated.  - Increase reglan qid.  - Small bowel follow through with normal findings.   - Schedule bisacodyl for bowel regimen.   - KUB 2/21 with significant amounts of stool and gas. Pt declines miralax, metamucil, colace.   - encouraged suppository.  - Improved on KUB 2/23.  - Pt moving bowels now but will have to keep aggressive bowel regimen on board.   - Brown bomb 3/5 with BM x 1.  - KUB 3/8 with findings concerning for a partial bowel obstruction.  - KUB 3/13 with nonspecific bowel gas pattern, encouraged taking colace, enema ordered with result

## 2018-03-15 NOTE — ASSESSMENT & PLAN NOTE
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+, steroid induction, on maintenance tacro/MMF/pred; c/b seizures, LAURA, superificial wound infection s/p wound vac to [facility] through 1/12, and several recent admissions on 11/24 and 12/3 with culture negative peritonitis with peak ascites WBC ~5k due to suspected indolent biliary leakage s/p sphincterotomy/biliary stent placement over CBD stricture on 12/6 ERCP vs another IA source) who was admitted on 1/11/2017 with acute onset fevers and acute on chronic N/V/abdominal pain (RLQ worst) and was found to have a loculated RLQ collection on imaging (not safe for percutaneous drainage), for which, after failure with more conservative treatments, he ultimately underwent laparoscopic drainage on 2/7/2018 (with dark brown fluid drained, culture negative but after extensive antibiotic exposure) but without complete resolution. He achieved defervescence but had persistent N/V/abdominal pain on empiric ertapenem with persistent loculated fluid noted on imaging that is not accessible to percutaneous drainage. 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 His recent course over the past week was c/b AMS/neurologic symptoms seemingly due to a drug effect (as they have improved as of 3/13 following recent cessation of ertapenem  and CSA) and recrudescent fevers after transition off carbapenem that have finally stopped the last few days of unknown etiology. He remains tenuous.     - would continue empiric vanc/cefepime/flagyl for now and observe GI symptoms  - additional prophylaxis per protocol (valcyte, atovaquone given bactrim allergy, and isavuconazole given complex course)  - will hold on LP for now -- if AMS/fevers recur, would pursue

## 2018-03-16 LAB
ABO + RH BLD: NORMAL
ALBUMIN SERPL BCP-MCNC: 1.9 G/DL
ALP SERPL-CCNC: 108 U/L
ALT SERPL W/O P-5'-P-CCNC: <5 U/L
ANION GAP SERPL CALC-SCNC: 10 MMOL/L
ANISOCYTOSIS BLD QL SMEAR: SLIGHT
AST SERPL-CCNC: 25 U/L
BASOPHILS # BLD AUTO: ABNORMAL K/UL
BASOPHILS NFR BLD: 0 %
BILIRUB SERPL-MCNC: 1.4 MG/DL
BLD GP AB SCN CELLS X3 SERPL QL: NORMAL
BUN SERPL-MCNC: 57 MG/DL
CALCIUM SERPL-MCNC: 8.4 MG/DL
CHLORIDE SERPL-SCNC: 105 MMOL/L
CMV DNA SERPL NAA+PROBE-ACNC: NORMAL IU/ML
CO2 SERPL-SCNC: 25 MMOL/L
CREAT SERPL-MCNC: 3.3 MG/DL
CYCLOSPORINE BLD LC/MS/MS-MCNC: 186 NG/ML
DIFFERENTIAL METHOD: ABNORMAL
EOSINOPHIL # BLD AUTO: ABNORMAL K/UL
EOSINOPHIL NFR BLD: 0 %
ERYTHROCYTE [DISTWIDTH] IN BLOOD BY AUTOMATED COUNT: 19.6 %
EST. GFR  (AFRICAN AMERICAN): 27.8 ML/MIN/1.73 M^2
EST. GFR  (NON AFRICAN AMERICAN): 24.1 ML/MIN/1.73 M^2
GLUCOSE SERPL-MCNC: 92 MG/DL
HCT VFR BLD AUTO: 25.2 %
HGB BLD-MCNC: 8.2 G/DL
IMM GRANULOCYTES # BLD AUTO: ABNORMAL K/UL
IMM GRANULOCYTES NFR BLD AUTO: ABNORMAL %
LYMPHOCYTES # BLD AUTO: ABNORMAL K/UL
LYMPHOCYTES NFR BLD: 26 %
MAGNESIUM SERPL-MCNC: 2.1 MG/DL
MCH RBC QN AUTO: 31.7 PG
MCHC RBC AUTO-ENTMCNC: 32.5 G/DL
MCV RBC AUTO: 97 FL
METAMYELOCYTES NFR BLD MANUAL: 1 %
MONOCYTES # BLD AUTO: ABNORMAL K/UL
MONOCYTES NFR BLD: 5 %
NEUTROPHILS NFR BLD: 61 %
NEUTS BAND NFR BLD MANUAL: 7 %
NRBC BLD-RTO: 0 /100 WBC
OVALOCYTES BLD QL SMEAR: ABNORMAL
PHOSPHATE SERPL-MCNC: 1.8 MG/DL
PLATELET # BLD AUTO: 162 K/UL
PMV BLD AUTO: 11.3 FL
POIKILOCYTOSIS BLD QL SMEAR: SLIGHT
POLYCHROMASIA BLD QL SMEAR: ABNORMAL
POTASSIUM SERPL-SCNC: 3.2 MMOL/L
PROT SERPL-MCNC: 5.5 G/DL
RBC # BLD AUTO: 2.59 M/UL
SODIUM SERPL-SCNC: 140 MMOL/L
WBC # BLD AUTO: 7.16 K/UL

## 2018-03-16 PROCEDURE — 90935 HEMODIALYSIS ONE EVALUATION: CPT | Mod: ,,, | Performed by: INTERNAL MEDICINE

## 2018-03-16 PROCEDURE — 97116 GAIT TRAINING THERAPY: CPT

## 2018-03-16 PROCEDURE — 85007 BL SMEAR W/DIFF WBC COUNT: CPT

## 2018-03-16 PROCEDURE — 80158 DRUG ASSAY CYCLOSPORINE: CPT

## 2018-03-16 PROCEDURE — 84100 ASSAY OF PHOSPHORUS: CPT

## 2018-03-16 PROCEDURE — 99232 SBSQ HOSP IP/OBS MODERATE 35: CPT | Mod: ,,, | Performed by: INTERNAL MEDICINE

## 2018-03-16 PROCEDURE — A4217 STERILE WATER/SALINE, 500 ML: HCPCS | Performed by: NURSE PRACTITIONER

## 2018-03-16 PROCEDURE — B4185 PARENTERAL SOL 10 GM LIPIDS: HCPCS | Performed by: NURSE PRACTITIONER

## 2018-03-16 PROCEDURE — 86901 BLOOD TYPING SEROLOGIC RH(D): CPT

## 2018-03-16 PROCEDURE — 20600001 HC STEP DOWN PRIVATE ROOM

## 2018-03-16 PROCEDURE — 94761 N-INVAS EAR/PLS OXIMETRY MLT: CPT

## 2018-03-16 PROCEDURE — 63600175 PHARM REV CODE 636 W HCPCS: Mod: JG | Performed by: NURSE PRACTITIONER

## 2018-03-16 PROCEDURE — 63600175 PHARM REV CODE 636 W HCPCS: Performed by: NURSE PRACTITIONER

## 2018-03-16 PROCEDURE — 99233 SBSQ HOSP IP/OBS HIGH 50: CPT | Mod: ,,, | Performed by: NURSE PRACTITIONER

## 2018-03-16 PROCEDURE — 25000003 PHARM REV CODE 250: Performed by: NURSE PRACTITIONER

## 2018-03-16 PROCEDURE — 25000003 PHARM REV CODE 250: Performed by: SURGERY

## 2018-03-16 PROCEDURE — 90935 HEMODIALYSIS ONE EVALUATION: CPT

## 2018-03-16 PROCEDURE — 25000003 PHARM REV CODE 250: Performed by: PHYSICIAN ASSISTANT

## 2018-03-16 PROCEDURE — 85027 COMPLETE CBC AUTOMATED: CPT

## 2018-03-16 PROCEDURE — S0030 INJECTION, METRONIDAZOLE: HCPCS | Performed by: NURSE PRACTITIONER

## 2018-03-16 PROCEDURE — 63600175 PHARM REV CODE 636 W HCPCS: Performed by: PHYSICIAN ASSISTANT

## 2018-03-16 PROCEDURE — 80053 COMPREHEN METABOLIC PANEL: CPT

## 2018-03-16 PROCEDURE — 83735 ASSAY OF MAGNESIUM: CPT

## 2018-03-16 PROCEDURE — 63600175 PHARM REV CODE 636 W HCPCS: Mod: JG | Performed by: SURGERY

## 2018-03-16 RX ORDER — POTASSIUM CHLORIDE 750 MG/1
40 CAPSULE, EXTENDED RELEASE ORAL EVERY 4 HOURS
Status: CANCELLED | OUTPATIENT
Start: 2018-03-16 | End: 2018-03-16

## 2018-03-16 RX ORDER — SODIUM CHLORIDE 9 MG/ML
INJECTION, SOLUTION INTRAVENOUS ONCE
Status: COMPLETED | OUTPATIENT
Start: 2018-03-16 | End: 2018-03-16

## 2018-03-16 RX ORDER — SODIUM CHLORIDE 9 MG/ML
INJECTION, SOLUTION INTRAVENOUS
Status: DISCONTINUED | OUTPATIENT
Start: 2018-03-16 | End: 2018-03-19

## 2018-03-16 RX ADMIN — BISACODYL 10 MG: 5 TABLET, COATED ORAL at 05:03

## 2018-03-16 RX ADMIN — OXYCODONE HYDROCHLORIDE 5 MG: 5 TABLET ORAL at 04:03

## 2018-03-16 RX ADMIN — OMEPRAZOLE 40 MG: 40 CAPSULE, DELAYED RELEASE ORAL at 05:03

## 2018-03-16 RX ADMIN — LEVETIRACETAM 500 MG: 100 SOLUTION ORAL at 11:03

## 2018-03-16 RX ADMIN — OXYCODONE HYDROCHLORIDE 5 MG: 5 TABLET ORAL at 07:03

## 2018-03-16 RX ADMIN — PREDNISONE 20 MG: 10 TABLET ORAL at 05:03

## 2018-03-16 RX ADMIN — CYCLOSPORINE 100 MG: 100 CAPSULE, LIQUID FILLED ORAL at 05:03

## 2018-03-16 RX ADMIN — LEVETIRACETAM 500 MG: 100 SOLUTION ORAL at 10:03

## 2018-03-16 RX ADMIN — SODIUM CHLORIDE: 9 INJECTION, SOLUTION INTRAVENOUS at 12:03

## 2018-03-16 RX ADMIN — DOCUSATE SODIUM 100 MG: 100 CAPSULE, LIQUID FILLED ORAL at 05:03

## 2018-03-16 RX ADMIN — SODIUM CHLORIDE: 9 INJECTION, SOLUTION INTRAVENOUS at 08:03

## 2018-03-16 RX ADMIN — METRONIDAZOLE 500 MG: 500 INJECTION, SOLUTION INTRAVENOUS at 01:03

## 2018-03-16 RX ADMIN — URSODIOL 300 MG: 300 CAPSULE ORAL at 10:03

## 2018-03-16 RX ADMIN — RETINOL, ERGOCALCIFEROL, .ALPHA.-TOCOPHEROL ACETATE, DL-, PHYTONADIONE, ASCORBIC ACID, NIACINAMIDE, RIBOFLAVIN 5-PHOSPHATE SODIUM, THIAMINE HYDROCHLORIDE, PYRIDOXINE HYDROCHLORIDE, DEXPANTHENOL, BIOTIN, FOLIC ACID, AND CYANOCOBALAMIN: KIT at 10:03

## 2018-03-16 RX ADMIN — SOYBEAN OIL 250 ML: 20 INJECTION, SOLUTION INTRAVENOUS at 10:03

## 2018-03-16 RX ADMIN — METRONIDAZOLE 500 MG: 500 INJECTION, SOLUTION INTRAVENOUS at 11:03

## 2018-03-16 RX ADMIN — CEFEPIME 1 G: 1 INJECTION, POWDER, FOR SOLUTION INTRAMUSCULAR; INTRAVENOUS at 04:03

## 2018-03-16 RX ADMIN — URSODIOL 300 MG: 300 CAPSULE ORAL at 11:03

## 2018-03-16 RX ADMIN — LEVOTHYROXINE SODIUM 75 MCG: 75 TABLET ORAL at 05:03

## 2018-03-16 RX ADMIN — OXYCODONE HYDROCHLORIDE 5 MG: 5 TABLET ORAL at 11:03

## 2018-03-16 RX ADMIN — ERYTHROPOIETIN 10000 UNITS: 10000 INJECTION, SOLUTION INTRAVENOUS; SUBCUTANEOUS at 10:03

## 2018-03-16 RX ADMIN — METRONIDAZOLE 500 MG: 500 INJECTION, SOLUTION INTRAVENOUS at 05:03

## 2018-03-16 RX ADMIN — DOCUSATE SODIUM 100 MG: 100 CAPSULE, LIQUID FILLED ORAL at 10:03

## 2018-03-16 RX ADMIN — HEPARIN SODIUM 1000 UNITS: 1000 INJECTION, SOLUTION INTRAVENOUS; SUBCUTANEOUS at 10:03

## 2018-03-16 RX ADMIN — HEPARIN SODIUM 5000 UNITS: 5000 INJECTION, SOLUTION INTRAVENOUS; SUBCUTANEOUS at 06:03

## 2018-03-16 NOTE — SUBJECTIVE & OBJECTIVE
Scheduled Meds:   bisacodyl  10 mg Oral Daily    ceFEPime (MAXIPIME) IVPB  1 g Intravenous Q12H    cycloSPORINE modified  100 mg Oral BID    docusate sodium  100 mg Oral BID    epoetin maurisio (PROCRIT) injection  10,000 Units Intravenous Every Mon, Wed, Fri    ergocalciferol  50,000 Units Oral Q7 Days    fat emulsion 20%  250 mL Intravenous Once    heparin (porcine)  5,000 Units Subcutaneous Q8H    custom IVPB builder   Intravenous Q24H    levetiracetam oral soln  500 mg Oral BID    levothyroxine  75 mcg Oral Before breakfast    metronidazole  500 mg Intravenous Q8H    multivitamin  1 tablet Oral Daily    omeprazole  40 mg Oral QAM    polyethylene glycol  17 g Oral BID    predniSONE  20 mg Oral Daily    psyllium husk (aspartame)  3.4 g Oral BID    ursodiol  300 mg Oral BID     Continuous Infusions:   TPN ADULT CENTRAL LINE CUSTOM 48 mL/hr at 03/15/18 2155    TPN ADULT CENTRAL LINE CUSTOM       PRN Meds:sodium chloride, sodium chloride, sodium chloride 0.9%, sodium chloride 0.9%, sodium chloride 0.9%, sodium chloride 0.9%, acetaminophen, albuterol-ipratropium 2.5mg-0.5mg/3mL, bisacodyl, dextrose 50%, dextrose 50%, diphenhydrAMINE-zinc acetate 1-0.1%, glucagon (human recombinant), glucose, glucose, heparin (porcine), naloxone, ondansetron, ondansetron, oxyCODONE, oxyCODONE, prochlorperazine, simethicone, sodium chloride 0.9%, sodium chloride 0.9%    Review of Systems   Constitutional: Positive for activity change, appetite change and fatigue.   Gastrointestinal: Positive for abdominal distention, abdominal pain and constipation.   Genitourinary: Positive for decreased urine volume.   Allergic/Immunologic: Positive for immunocompromised state.   Psychiatric/Behavioral: Positive for dysphoric mood.        Tearful     Objective:     Vital Signs (Most Recent):  Temp: 99.1 °F (37.3 °C) (03/16/18 1321)  Pulse: 77 (03/16/18 1321)  Resp: 16 (03/16/18 1321)  BP: (!) 140/84 (03/16/18 1321)  SpO2: 98 %  (03/16/18 1321) Vital Signs (24h Range):  Temp:  [97.6 °F (36.4 °C)-99.1 °F (37.3 °C)] 99.1 °F (37.3 °C)  Pulse:  [64-83] 77  Resp:  [16-18] 16  SpO2:  [95 %-98 %] 98 %  BP: (134-158)/(79-95) 140/84     Weight: 74.8 kg (164 lb 14.5 oz)  Body mass index is 26.62 kg/m².    Intake/Output - Last 3 Shifts       03/14 0700 - 03/15 0659 03/15 0700 - 03/16 0659 03/16 0700 - 03/17 0659    P.O. 955 620     I.V. (mL/kg)       Other 600  600    IV Piggyback 550 450     TPN 1362.9 1354.5     Total Intake(mL/kg) 3467.9 (47.1) 2424.5 (32.4) 600 (8)    Urine (mL/kg/hr) 0 (0) 150 (0.1) 250 (0.5)    Emesis/NG output 0 (0) 0 (0)     Other 4600 (2.6) 0 (0) 4108 (7.4)    Stool 0 (0) 0 (0) 0 (0)    Blood 0 (0) 0 (0)     Total Output 4600 150 4358    Net -1132.1 +2274.5 -3758           Urine Occurrence 1 x 1 x     Stool Occurrence 0 x 0 x 0 x    Emesis Occurrence 0 x 0 x           Physical Exam   Constitutional: He is oriented to person, place, and time. He appears well-developed.   Hand and temporal muscle wasting   HENT:   Head: Normocephalic.   Eyes: Pupils are equal, round, and reactive to light. No scleral icterus.   Cardiovascular: Normal rate, regular rhythm, normal heart sounds and intact distal pulses.    Pulmonary/Chest: Effort normal. No respiratory distress. He has decreased breath sounds in the right lower field and the left lower field. He has no wheezes. He has no rhonchi. He has no rales.   L base, decreased BS R base   Abdominal: Soft. Bowel sounds are normal. He exhibits distension. There is no tenderness.   Pain with palpation   Musculoskeletal: Normal range of motion. He exhibits edema (generalized).   Neurological: He is alert and oriented to person, place, and time.   Skin: Skin is warm and dry.   Psychiatric: He has a normal mood and affect. His behavior is normal. Judgment and thought content normal.   Nursing note and vitals reviewed.      Laboratory:  Immunosuppressants         Stop Route Frequency      cycloSPORINE modified (NEORAL) capsule 100 mg      -- Oral 2 times daily     cycloSPORINE modified (NEORAL) 25 MG capsule      03/09 1714       cycloSPORINE modified (NEORAL) 100 MG capsule      03/09 1714       cycloSPORINE modified (NEORAL) 100 MG capsule      03/09 0514       cycloSPORINE modified (NEORAL) 25 MG capsule      03/09 0514          CBC:   Recent Labs  Lab 03/16/18  0357   WBC 7.16   RBC 2.59*   HGB 8.2*   HCT 25.2*      MCV 97   MCH 31.7*   MCHC 32.5     CMP:   Recent Labs  Lab 03/16/18  0357   GLU 92   CALCIUM 8.4*   ALBUMIN 1.9*   PROT 5.5*      K 3.2*   CO2 25      BUN 57*   CREATININE 3.3*   ALKPHOS 108   ALT <5*   AST 25   BILITOT 1.4*     Coagulation:   Recent Labs  Lab 03/12/18  1004   INR 1.1     Labs within the past 24 hours have been reviewed.    Diagnostic Results:  I have personally reviewed all pertinent imaging studies.

## 2018-03-16 NOTE — ASSESSMENT & PLAN NOTE
- detected at 370 on CMV PCR 2/1.  - case discussed with ID and will hold off on treatment at this time given low WBC.   - undetected CMV PCR 2/8, 2/15, 2/22, 3/8   - continue Valcyte 200 mg every Mon, Wed, Fri.  - Per ID, valcyte dose decreased to ppx dose on 2/18--Valcyte d/c'd 2/25  - Repeat scope 3/6 to assess persistent N/V and possible CMV causing these symptoms. Biopsies thus far normal, cmv stains negative.  - continue to monitor weekly CMV PCR's, last PCR pending from 3/15.

## 2018-03-16 NOTE — ASSESSMENT & PLAN NOTE
- h/o constipation, reports having regular BM's with bowel regimen.  - vomiting episode x 1 at home. Anti-emetics ordered PRN.  - KUB repeated 1/14/18 given increased abdominal pain- mild, generalized bowel distention suggesting ileus.  Diet changed to clears as tolerated for bowel rest .  - SHANE tube intermittent re-placed several times this admission but it was poorly tolerated due to persistent N/V--last SHANE tube d/c'd 2/23  - chronic constipation, Cont bowel regimen.   - 2/24 changes: Increased Reglan to 10 mg, D/C atovaquone, transitioned cyclo to Prograf, change twyla to BID, check Keppra level  - GI consulted. EGD 3/6 as with continued nausea/vomiting with po intake. Appreciate GI recs.    - Cont to have nausea and vomiting w medications and eating.  Cont TPN.     - holding reglan 3/8 due to neurologic symptoms.

## 2018-03-16 NOTE — PROGRESS NOTES
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, CMV D+/R+, steroid induction, on maintenance tacro/MMF/pred; c/b seizures, LAURA, superificial wound infection s/p wound vac to [facility] through 1/12, and several recent admissions on 11/24 and 12/3 with culture negative peritonitis with peak ascites WBC ~5k due to suspected indolent biliary leakage s/p sphincterotomy/biliary stent placement over CBD stricture on 12/6 ERCP vs another IA source) who was admitted on 1/11/2017 with acute onset fevers and acute on chronic N/V/abdominal pain (RLQ worst) and was found to have a loculated RLQ collection on imaging (not safe for percutaneous drainage), for which, after failure with more conservative treatments, he ultimately underwent laparoscopic drainage on 2/7/2018 (with dark brown fluid drained, culture negative but after extensive antibiotic exposure) but without complete resolution. He achieved defervescence but had persistent N/V/abdominal pain on empiric ertapenem with persistent loculated fluid noted on imaging that is not accessible to percutaneous drainage. 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 His recent course over the past week was c/b AMS/neurologic symptoms seemingly due to a drug effect (as they have improved as of 3/13 following recent cessation of ertapenem and CSA) and recrudescent fevers after transition off carbapenem that have finally stopped the last few days of unknown etiology. He remains tenuous.     - would continue empiric cefepime/flagyl for now and continue to observe GI symptoms -- if they persist, may consider lower endoscopy since I do not think he has undergone this evaluation yet in extensive FUO workup  - additional prophylaxis per protocol (valcyte, atovaquone given bactrim allergy, and isavuconazole given complex course)            Anticipated Disposition: pending improvement    Thank you for your consult. I will follow-up with patient. Please contact us if you have any additional questions.     Melanie Bergman MD  Transplant ID Attending  [phone]    Melanie Bergman MD  Infectious Disease  [facility]    Subjective:     Principal Problem:Fever    HPI: No notes on file  Interval History: No acute change today in neuro status. Afebrile. Still with nausea and abdominal pain. Appetite present again.    Review of Systems   Constitutional: Negative for activity change, appetite change, chills, fatigue and fever.   HENT: Negative for congestion and facial swelling.    Eyes: Negative for pain, discharge and visual disturbance.   Respiratory: Negative for cough, chest tightness, shortness of breath and wheezing.    Cardiovascular: Negative for chest pain, palpitations and leg swelling.   Gastrointestinal: Positive for abdominal distention, abdominal pain and nausea. Negative for constipation and vomiting.   Endocrine: Negative.    Genitourinary:        Anuric   Musculoskeletal: Negative for arthralgias, back pain and  myalgias.   Skin: Positive for wound. Negative for color change and rash.   Allergic/Immunologic: Positive for immunocompromised state.   Neurological: Negative for dizziness, seizures, weakness and headaches.   Psychiatric/Behavioral: Negative for confusion, decreased concentration and dysphoric mood. The patient is not nervous/anxious.    All other systems reviewed and are negative.      Objective:     Vital Signs (Most Recent):  Temp: 99.2 °F (37.3 °C) (02/17/18 1542)  Pulse: 90 (02/17/18 1542)  Resp: 16 (02/17/18 1542)  BP: 135/85 (02/17/18 1542)  SpO2: 95 % (02/17/18 1542) Vital Signs (24h Range):  Temp:  [98.6 °F (37 °C)-101.2 °F (38.4 °C)] 99.2 °F (37.3 °C)  Pulse:  [84-92] 90  Resp:  [16-20] 16  SpO2:  [92 %-96 %] 95 %  BP: (128-137)/(75-85) 135/85     Weight: 60.2 kg (132 lb 11.5 oz)  Body mass index is 21.42 kg/m².    Estimated Creatinine Clearance: 26.8 mL/min (A) (based on SCr of 3.5 mg/dL (H)).    Physical Exam   Constitutional: He is oriented to person, place, and time. He appears well-developed. No distress.   Temporal and distal extremity muscle wasting   HENT:   Head: Normocephalic and atraumatic.   Mouth/Throat: No oropharyngeal exudate.   dylan tube   Eyes: EOM are normal. Pupils are equal, round, and reactive to light. No scleral icterus.   Neck: Normal range of motion. Neck supple. No JVD present. No thyromegaly present.   Cardiovascular: Normal rate, regular rhythm, normal heart sounds and intact distal pulses.    No murmur heard.  Pulmonary/Chest: Effort normal. No respiratory distress. He has no wheezes. He exhibits no tenderness.   Diminished to RLL   Abdominal: Soft. Bowel sounds are normal. He exhibits ascites. He exhibits no distension. There is tenderness. There is no rebound and no guarding.   Dressing to chevron.  Wd vac removed 1/12/18  Dependent edema present R flank; surrounding erythema   Musculoskeletal: Normal range of motion. He exhibits no tenderness. Edema: 2+ LE edema.    Neurological: He is alert and oriented to person, place, and time. He has normal reflexes.   Skin: Skin is warm and dry. He is not diaphoretic. No erythema.   Psychiatric: His mood appears not anxious.   Nursing note and vitals reviewed.      Significant Labs:   CBC:     Recent Labs  Lab 02/16/18  0436 02/17/18  0343   WBC 2.85* 2.23*   HGB 8.8* 7.8*   HCT 26.8* 23.9*    134*     CMP:     Recent Labs  Lab 02/16/18  0436 02/17/18  0343   * 137   K 4.0 3.8    105   CO2 23 23   GLU 84 81   BUN 23* 13   CREATININE 5.1* 3.5*   CALCIUM 9.5 8.8   PROT 5.6* 5.1*   ALBUMIN 2.0* 1.8*   BILITOT 2.2* 2.1*   ALKPHOS 125 111   AST 25 18   ALT <5* <5*   ANIONGAP 11 9   EGFRNONAA 14.2* 22.4*       Significant Imaging: I have reviewed all pertinent imaging results/findings within the past 24 hours.     CT CAP:   Post surgical changes prior hepatic transplant.  Percutaneous surgical drain with notable decrease in size of fluid collection in lower abdomen.  Small amount of fluid tracking elsewhere within the abdomen and pelvis.  Large volume right pleural fluid, increased in size since prior exam, with associated right lower lobe atelectasis.  Diffuse body wall edema.  Small pericardial effusion.    Microbiology:  1/11 blood cx: negative  1/11 pleural fluid cx: negative  1/11 ascites cx: negative  1/17 blood cx: negative  1/18 blood cx: negative  1/19 pleural fluid cx: NGTD  1/19 ascites cx: NGTD  2/1 RVP negative  2/7 OR cx: NGTD  2/11 blood cx: negative  2/15 blood cx: negative  2/16 pleural fluid cx: negative

## 2018-03-16 NOTE — ASSESSMENT & PLAN NOTE
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, CMV D+/R+, steroid induction, on maintenance tacro/MMF/pred; c/b seizures, LAURA, superificial wound infection s/p wound vac to [facility] through 1/12, and several recent admissions on 11/24 and 12/3 with culture negative peritonitis with peak ascites WBC ~5k due to suspected indolent biliary leakage s/p sphincterotomy/biliary stent placement over CBD stricture on 12/6 ERCP vs another IA source) who was admitted on 1/11/2017 with acute onset fevers and acute on chronic N/V/abdominal pain (RLQ worst) and was found to have a loculated RLQ collection on imaging (not safe for percutaneous drainage), for which, after failure with more conservative treatments, he ultimately underwent laparoscopic drainage on 2/7/2018 (with dark brown fluid drained, culture negative but after extensive antibiotic exposure) but without complete resolution. He achieved defervescence but had persistent N/V/abdominal pain on empiric ertapenem with persistent loculated fluid noted on imaging that is not accessible to percutaneous drainage. 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 His recent course over the past week was c/b AMS/neurologic symptoms seemingly due to a drug effect (as they have improved as of 3/13 following recent cessation of ertapenem  and CSA) and recrudescent fevers after transition off carbapenem that have finally stopped the last few days of unknown etiology. He remains tenuous.     - would continue empiric cefepime/flagyl for now and continue to observe GI symptoms -- if they persist, may consider lower endoscopy since I do not think he has undergone this evaluation yet in extensive FUO workup  - additional prophylaxis per protocol (valcyte, atovaquone given bactrim allergy, and isavuconazole given complex course)

## 2018-03-16 NOTE — PROGRESS NOTES
Patient transferred to HD per wheelchair by transport aide.   Report called to receiving nurse.   No acute distress noted. Will monitor for return.

## 2018-03-16 NOTE — PLAN OF CARE
Problem: Patient Care Overview  Goal: Plan of Care Review  Patient is AAOx4. Patient educated to use call bell for assistance, verbalized understanding. Patient remains free from falls or injury so far this shift, ambulates with 1 person assistance and walker. Patient worked with PT today.   Afebrile, WBC 7.16. Continuing IV flagyl, cefepime and isavuconazonium.   LIJ with TPN at 48 cc/hr. Patient reported no appetite for breakfast or lunch. Patient encouraged to eat whatever he can.   Chevron incision with hydrocolloid dressing to R side dressing change due 3/20/18.   Patient oliguric - HD today with 3.5 L off.   Patient had no bowel movement so far this shift.   Patient complained of abdominal pain once so far this shift, moderate relief noted with PRN oxycodone.   No complaints of nausea so far this shift.   No skin breakdown noted. Patient able to reposition self independently.    See flow sheet for complete assessment details.

## 2018-03-16 NOTE — ASSESSMENT & PLAN NOTE
- chronic  - encourage ambulation, stool softeners and laxatives, often refuses some meds 2/2 nausea, will often require suppository or brown bomb enema to have a BM  - Small bowel follow through with normal findings

## 2018-03-16 NOTE — PROGRESS NOTES
[facility]  Liver Transplant  Progress Note    Patient Name: Jhonny Diana  MRN: [id number]  Admission Date: 2018  Hospital Length of Stay: 64 days  Code Status: Full Code  Primary Care Provider: Primary Doctor No  Post-Operative Day: 148    ORGAN:   LIVER  Disease Etiology: Acute Alcoholic Hepatitis  Donor Type:    - Brain Death  CDC High Risk:   No  Donor CMV Status:   Donor CMV Status: Positive  Donor HBcAB:   Negative  Donor HCV Status:   Negative  Whole or Partial: Whole Liver  Biliary Anastomosis: End to End  Arterial Anatomy: Standard  Subjective:     History of Present Illness:  Jhonny Diana is a 28 y/o male with past medical history of alcoholic cirrhosis.  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 Found to have peritonitis in November (WBC 5000, 75% PNM) neg for bile leak. He was treated initially with vanc/cefepime. Repeat cell counts  with some improvement (WBC 1400, 45% PNM). He has undergone multiple paracenteses as well as abscess drainage of perihepatic fluid collections and treated with antimicrobial therapy but no positive cultures. Of note, biliary stricture also identified and ERCP performed on 2017 with sphincterotomy and biliary stent placed. Liver tests still have not normalized despite intervention, bilirubin and AP remain elevated. Other pertinent PMH current wound vac in place 2/2 wound infection, malnutrition requiring TPN for short course and ongoing hypoalbuminemia, and seizure activity while on prograf and has since been switched to cyclosporine without reoccurrence.  He presented to the ER for fever, abdominal pain, and N/V. He reports fever (103) for 1  day prior. Overnight, he developed N/V, reports small amount of green emesis with new left sided pain. He also endorses worsening SOB with exertion. He was scheduled as an outpatient for follow up paracentesis and IR drainage of fluid collection. CXR in ER shows large pleural effusion with subsegmental atelectasis. Infectious work up initiated in ER. His ANC is 900. Broad spectrum antibiotics initiated in ED. At admit, he denied chest pain, palpitations, diarrhea, constipation, back pain, or any sick contacts.    Hospital Course:  Thoracentesis (1.2L off), Paracentesis (1.6L off), and IR drainage of fluid collection 1/11, all fluids negative for infection. ID consulted. Broad spectrum antibiotics d/c'd 1/15. Chronically malnourished with poor PO intake, prealbumin 7. Shane tube placed 1/15 for tube feedings.     ERCP 1/17 with sludge and a biliary stone which was removed and stent exchanged.  Remained with n/v; therefore, TF remained on hold and TPN continued.   Pt with fever s/p ERCP on 1/17 which continued until 1/19.  Vanc/cefepime restarted.  Blood cx 1/17 and 1/18 NGTD.  ID reconsulted.  Fungal markers sent.  CT C/A/P obtained.  With large R pleural effusion and ascites- both drained 1/19 and negative for infx per cell count. Pt afebrile 1/20. Liver biopsy 1/23 - without rejection.  EGD 1/25 - unremarkable for source of GI symptoms.  Resume diet along with other GI recommendations. SHANE tube placed for tube feeds.       2/22: Pt again spiked temp of 101 overnight, now down to 99. Resent Bcx overnight, prelim NGTD. Transitioned to Erta from vanc/zosyn per ID. Planning for tagged WBC scan on Monday. R sided abdominal erythema/dependent edema improving today.  TF have been on hold since 2/20. Will continue to hold today per pt request. Pt working on diet. KUB yesterday with significant stool and gas. Declines colace, miralax, metamucil, but willing to take daily bisacodyl. Pt agreeable to suppository today. If  suppository not successful can give mag citrate through dylan tonight. Will also retrial Marinol tonight as pt reported improvement in appetite previously. Will monitor closely for sedation.     2/23:  2 small BM 2/22.  Tolerated HD.  [place] removed.  Will attempt to eat this PM.      2/24:  Pt ate then vomited hamburger 2/23.  Will resume TPN.  D/C atovaquone, transition cyclo to Prograf, check Keppra level, increase Reglan to 0 mg, change twyla to BID for nausea.  Will give pentam today.    2/25:  TPN going well, pt ate 1/2 subway sandwhich without vomiting yesterday, will continue to monitor.  Plan to repeat EGD this week.  Pt with drop in H/h again and appears to be hemolyzing.  LDH elevated, hapto slightly low, LEE pending.  With neutropenia- Neupogen given.    2/26:  Pt with severe back pain s/p HD. Unclear if secondary to neupogen.  Repeat labs stable. Indium scan scheduled for 2/27- injection done today (back pain started prior). CT A/P obtained with fluid collection seen in pelvis.    2/27: Patient spiking fevers, 101. Indium scan, negative results. CT A/P obtained 2/26 with focal/loculated region of fluid in the lower right abdomen measuring approximately 8.7 x 5.5 cm. IR attempted drainage, however, per IR,there was  limited fluid interposed with bowel in the RLQ with no safe pocket seen for drainage.    2/28: Remains with low grade fevers. Neupogen given.     3/1: Patient with stuttering speech, trouble recalling specific words when speaking. Does have hx of seizure (on keppra) previously while on prograf. Switched to prograf 2/24 from cyclosporine. Neuro exam unremarkable. CT head without contrast obtained, unremarkable. In light of these symptoms, prograf d/c, cyclosporine restarted 3/1. Per transplant attending, held off on EGD at this time as concern anesthesia would put patient at further increase risk for seizure activity.     3/4: pt walked in room yesterday.  Had 1 BM.  No appetite still.  Cont TPN.   He reports swelling and back pain today.  He vomited after taking Oxy PO.  Fentanyl 12.5mg IV ordered.  Patient rested well afterward.  Remains afebrile.   ID following.   Cont Ertapenem.  Repeat blood and urine cx given continued malaise and stutter- blood cx prelim NGTD.  UA ok.  Urine cx pending.    Tentative plan for EGD on Monday to assess source of fevers/nausea/abdominal pain w bx for CMV and GVHD.  Will also reassess rather able to perform paracentesis.     3/5: no acute events overnight. Pt still not feeling well and with poor sleep overnight. Cont to have mumbling of speech at times but slightly better than before. Remains with abdominal pain but relieved with PO pain meds. Tolerated HD well today. Will plan for paracentesis and EGD tomorrow to assess for peritonitis and cause of persistent N/V respectively.     3/6: pt continues to have garbled/mumbled speech. EGD performed today and pt received sedation during procedure. Increased lethargy and dysarthria noted post procedure. VS stable. Paracentesis scheduled today as well to r/o peritonitis. Wbc count stable. Afebrile. Continue Ertapenem. HD yesterday and pt tolerated well, 2.5 L removed. Monitor.     3/7: MRI brain yesterday unremarkable. Blood cultures ngtd so far. Neurology consulted. EGD and Paracentesis yesterday. Para with 25 cc removed, wbc 28 segs 3%. Continue TPN. Monitor.    3/8: EUS and ERCP scheduled tomorrow. NPO after midnight. T bili continues trending up daily. Last ERCP on 2/19 with two stents placed. Ertapenem dc'd 3/7. Pt spiking temps today, tmax 100.9. Cefepime/vanc started. Oxycodone decreased to 5 mg q6h prn. Dc marinol and reglan for now. He continues to slur/stutter and have jerky movements throughout the day. Neurology following. EEG scheduled today. Monitor.     3/9: EUS/ERCP completed with choledoliathiasis.  Fever over night.  May need LP in future if mental status does not improve.      3/10- speech improved, neuro  suggesting lumbar puncture - schedule for Monday    3/11 - more lethargic, tmax 100.9, plan for lumbar puncture in am.    3/12 - mental status greatly improved, walking halls with PT, will hold off on LP for now.    3/13 - mental status remains stable, complains of pain to lower abdomen, bowel movement with suppository over night, KUB with non specific bowel gas pattern, would like to increase pain regimen - spoke with patient and mother this was counter intuitive as narcotics slow down the bowels, patient states understanding - will take colace religiously with increased pain medicine.  Also complaining of sob - chest xray and dose of diamox    3/14: Mentation good.  Continue cyclosporine.   Tolerated HD.  Decrease hydrocortisone dose once pt has cyclosporine level.  Transjug bx shows mild cholestatic hepatitis --> unable to cx since bx performed via EUS.  Will plan to obtain culture with next bx.    3/15: Pt feeling well.  Still reports abd pain which is slightly improved.  He feels eating is going well- calorie count initiated --> goal to decreased then D/c TPN.  Restart of TF discussed with pt and mom but will allow for a 48 hour calorie count.  Hydrocortisone transitioned back to prednisone taper.  Pt with 7 bands --> will continue to monitor closely for infection.    3/16: More fatigued today, feels sleepy after HD and walking in halls with PT. Very tearful, patient denied wanting to talk with someone about his mood and emotions and declined anti-depressive medications at this time. Still only eating 25% of 2/3 meals daily with minimal calories. Continue TPN/Lipids for now. Not interested in drinking boost supplements or tube feeding. Bands continue to be elevated at 7, continue to monitor.    Scheduled Meds:   bisacodyl  10 mg Oral Daily    ceFEPime (MAXIPIME) IVPB  1 g Intravenous Q12H    cycloSPORINE modified  100 mg Oral BID    docusate sodium  100 mg Oral BID    epoetin maurisio (PROCRIT) injection   10,000 Units Intravenous Every Mon, Wed, Fri    ergocalciferol  50,000 Units Oral Q7 Days    fat emulsion 20%  250 mL Intravenous Once    heparin (porcine)  5,000 Units Subcutaneous Q8H    custom IVPB builder   Intravenous Q24H    levetiracetam oral soln  500 mg Oral BID    levothyroxine  75 mcg Oral Before breakfast    metronidazole  500 mg Intravenous Q8H    multivitamin  1 tablet Oral Daily    omeprazole  40 mg Oral QAM    polyethylene glycol  17 g Oral BID    predniSONE  20 mg Oral Daily    psyllium husk (aspartame)  3.4 g Oral BID    ursodiol  300 mg Oral BID     Continuous Infusions:   TPN ADULT CENTRAL LINE CUSTOM 48 mL/hr at 03/15/18 2155    TPN ADULT CENTRAL LINE CUSTOM       PRN Meds:sodium chloride, sodium chloride, sodium chloride 0.9%, sodium chloride 0.9%, sodium chloride 0.9%, sodium chloride 0.9%, acetaminophen, albuterol-ipratropium 2.5mg-0.5mg/3mL, bisacodyl, dextrose 50%, dextrose 50%, diphenhydrAMINE-zinc acetate 1-0.1%, glucagon (human recombinant), glucose, glucose, heparin (porcine), naloxone, ondansetron, ondansetron, oxyCODONE, oxyCODONE, prochlorperazine, simethicone, sodium chloride 0.9%, sodium chloride 0.9%    Review of Systems   Constitutional: Positive for activity change, appetite change and fatigue.   Gastrointestinal: Positive for abdominal distention, abdominal pain and constipation.   Genitourinary: Positive for decreased urine volume.   Allergic/Immunologic: Positive for immunocompromised state.   Psychiatric/Behavioral: Positive for dysphoric mood.        Tearful     Objective:     Vital Signs (Most Recent):  Temp: 99.1 °F (37.3 °C) (03/16/18 1321)  Pulse: 77 (03/16/18 1321)  Resp: 16 (03/16/18 1321)  BP: (!) 140/84 (03/16/18 1321)  SpO2: 98 % (03/16/18 1321) Vital Signs (24h Range):  Temp:  [97.6 °F (36.4 °C)-99.1 °F (37.3 °C)] 99.1 °F (37.3 °C)  Pulse:  [64-83] 77  Resp:  [16-18] 16  SpO2:  [95 %-98 %] 98 %  BP: (134-158)/(79-95) 140/84     Weight: 74.8 kg (164  lb 14.5 oz)  Body mass index is 26.62 kg/m².    Intake/Output - Last 3 Shifts       03/14 0700 - 03/15 0659 03/15 0700 - 03/16 0659 03/16 0700 - 03/17 0659    P.O. 955 620     I.V. (mL/kg)       Other 600  600    IV Piggyback 550 450     TPN 1362.9 1354.5     Total Intake(mL/kg) 3467.9 (47.1) 2424.5 (32.4) 600 (8)    Urine (mL/kg/hr) 0 (0) 150 (0.1) 250 (0.5)    Emesis/NG output 0 (0) 0 (0)     Other 4600 (2.6) 0 (0) 4108 (7.4)    Stool 0 (0) 0 (0) 0 (0)    Blood 0 (0) 0 (0)     Total Output 4600 150 4358    Net -1132.1 +2274.5 -3758           Urine Occurrence 1 x 1 x     Stool Occurrence 0 x 0 x 0 x    Emesis Occurrence 0 x 0 x           Physical Exam   Constitutional: He is oriented to person, place, and time. He appears well-developed.   Hand and temporal muscle wasting   HENT:   Head: Normocephalic.   Eyes: Pupils are equal, round, and reactive to light. No scleral icterus.   Cardiovascular: Normal rate, regular rhythm, normal heart sounds and intact distal pulses.    Pulmonary/Chest: Effort normal. No respiratory distress. He has decreased breath sounds in the right lower field and the left lower field. He has no wheezes. He has no rhonchi. He has no rales.   L base, decreased BS R base   Abdominal: Soft. Bowel sounds are normal. He exhibits distension. There is no tenderness.   Pain with palpation   Musculoskeletal: Normal range of motion. He exhibits edema (generalized).   Neurological: He is alert and oriented to person, place, and time.   Skin: Skin is warm and dry.   Psychiatric: He has a normal mood and affect. His behavior is normal. Judgment and thought content normal.   Nursing note and vitals reviewed.      Laboratory:  Immunosuppressants         Stop Route Frequency     cycloSPORINE modified (NEORAL) capsule 100 mg      -- Oral 2 times daily     cycloSPORINE modified (NEORAL) 25 MG capsule      03/09 1714       cycloSPORINE modified (NEORAL) 100 MG capsule      03/09 1714       cycloSPORINE modified  (NEORAL) 100 MG capsule      03/09 0514       cycloSPORINE modified (NEORAL) 25 MG capsule      03/09 0514          CBC:   Recent Labs  Lab 03/16/18  0357   WBC 7.16   RBC 2.59*   HGB 8.2*   HCT 25.2*      MCV 97   MCH 31.7*   MCHC 32.5     CMP:   Recent Labs  Lab 03/16/18  0357   GLU 92   CALCIUM 8.4*   ALBUMIN 1.9*   PROT 5.5*      K 3.2*   CO2 25      BUN 57*   CREATININE 3.3*   ALKPHOS 108   ALT <5*   AST 25   BILITOT 1.4*     Coagulation:   Recent Labs  Lab 03/12/18  1004   INR 1.1     Labs within the past 24 hours have been reviewed.    Diagnostic Results:  I have personally reviewed all pertinent imaging studies.    Assessment/Plan:     Fever    - Started on vanc/cefepime for ERCP then transitioned to vanc/zosyn.  - Blood cultures sent, prelim NGTD. Repeat blood cultures and chest xray today 2/10.  - thoracentesis 2/1 - fluid negative for infection.  - now cmv positive - started treatment 2/5/18. CMV PCR 2/15 negative  - taken to OR 2/7 for exp lap - fluid collections drained, cultures NGTD.  - CT A/P 2/15 with no evidence of IA fluid infection. Did show R pleural effusion s/p thoracentesis, cell count negative for infection, cx pending.  - Repeat blood cx 2/15 prelim NGTD  - Bilirubin remains elevated. Concerning for cholangitis. Antibiotics broadened 2/17. ID re consulted.   - ERCP  2/19, overall unremarkable findings. Did place new larger stents. Will need repeat ERCP in 10 weeks. Bilirubin is stable post ERCP.  - Tmax 101 2/21 PM again ID following. Plan to transition vanc/zosyn to ertapenem 2/22 as no improvement noted.   - Erta started 2/22.  - CT A/P obtained 2/26 with focal/loculated region of fluid in the lower right abdomen measuring approximately 8.7 x 5.5 cm.   - IR attempted drainage on fluid collection on 2/27, however, per IR,there was limited fluid interposed with bowel in the RLQ with no safe pocket seen for drainage.  - Tagged WBC scan 2/27, negative results.  - Remained  with intermittent fevers without clear infection source.   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 EBV PCR negative. Adenovirus negative. Repeat CMV PCR negative.    - Back pain and abdominal pain persists. Last BM 3/13 with brown bomb.   - ID following. Cont Ertapenem dc'd 3/7 - due to AMS --> Tmax 101.1 overnight 3/9 - Cefepime/vanc/flagyl.  Monitor.   - Remains afebrile x several days  - Bx with cholestatic hepatitis --> couldn't cx secondary to EUS contamination --> will cx next bx  - Plan to continue abx x 14 days to treat cholangitis --> stop date 3/23.  - Bandemia noted on labs 3/14 was 6 then 3/15 and 3/16 was 7.0, continue to monitor.        Severe protein-calorie malnutrition    - Poor PO intake since transplant requiring short course of TPN during previous hospital stay.   - Dietary consulted. Will need to closely monitor PO intake.   - supplements also ordered, refusing to drink  - SHANE with tube feeding start 1/16. Stopped 1/17/18, was not tolerating. Shane removed 1/20.  - prealbumin 7 on 1/15 --> prealbumin 10 on 3/12, monitoring weekly  - TPN started 1/16/18.   - GI consulted - EGD reviewed, increased ppi bid, miralax bid, and metamucil bid, decreased narcotics.  - Poorly tolerated TF, had worsening nausea. Not tolerating well due to nausea.   - small bowel follow through with normal findings.   - consulted gi for possible gj tube placement, holding off on this for now as with ongoing fevers.  - retrial marinol 2/22 as pt reports that's the only thing helping his appetite, in the evening 2/2 concern for possible drowsiness --> marinol d/c due to neuro symptoms and sedation  - Pt then developed persistent N/V again and TPN started 2/24. Cont TPN for now.   - EGD 3/6 with normal esophagus    - congested, erythematous and nodular mucosa in the stomach.    - biliary stent in the duodenum    - one duodenal ulcer with a clean ulcer base (Emir Class III).    - biopsies taken in the duodenal  bulb and second and third portion of the duodenum.  - Hold Reglan for neuro symptoms.  - Continue TPN, tolerating diet, but has minimal appetite, eating 25% 2/3 meals  - Calorie count initiated x 48 hours to reassess TPN and assess need for TF        Biliary stricture of transplanted liver    - ERCP 12/6 with post-anastomosis stricture with stent placement.  - Tbili with increase prompting ERCP 1/30 with stones and sludge, stents placed.  - Repeat ERCP 2/19 as was having recurrent fevers with bilirubin stuck at 2.0-2.2 with overall unremarkable findings seen. Did place new larger stents.   - US 3/2 stent in CBD  - ERCP/EUS 3/9 with mental stent placed in bile duct, stones removed  - Tbili remains stable        Recurrent pleural effusion on right    - CXR in ER with large right pleural effusion with subjective c/o worsening SOB.  - Multiple Thora's this admission, all non-infectious: 1/11, 1/19, 1/31, 2/16  - CT A/P 2/26 with large R pleural effusion, may need repeat thora if fevers reoccur.    - complaining of sob, obtain chest xray, trial dose of diamox 3/14 with minimal UOP  - chest xray with large pleural effusion 3/13 --> may need thora soon if becomes symptomatic or develops fever  - Fluid removal with HD        Liver transplanted    - Post op course complicated by fevers and hyperbilirubinemia, AST/ALT stable.  - see biliary stricture of transplanted liver  - Liver US 1/8 showed 3.9 cm complex fluid collection anterior to right lobe and moderate nonspecific complex ascites inferior to transplant.   - IR placed drain 1/11/18, cell count negative for infection.  - Liver biopsy 1/23 without rejection.  - Paracentesis 3/6 negative for infection, wbc 28 segs 3%.  - ERCP, EUS biopsy 3/9/18 - no rejection, mild cholestatic hepatitis seen.        Vitamin D deficiency    - Continue ergo 50K weekly.         Kidney transplant candidate              Other cytomegaloviral diseases    - detected at 370 on CMV PCR 2/1.  -  case discussed with ID and will hold off on treatment at this time given low WBC.   - undetected CMV PCR 2/8, 2/15, 2/22, 3/8   - continue Valcyte 200 mg every Mon, Wed, Fri.  - Per ID, valcyte dose decreased to ppx dose on 2/18--Valcyte d/c'd 2/25  - Repeat scope 3/6 to assess persistent N/V and possible CMV causing these symptoms. Biopsies thus far normal, cmv stains negative.  - continue to monitor weekly CMV PCR's, last PCR pending from 3/15.        Constipation    - chronic  - encourage ambulation, stool softeners and laxatives, often refuses some meds 2/2 nausea, will often require suppository or brown bomb enema to have a BM  - Small bowel follow through with normal findings        Nausea and vomiting    - h/o constipation, reports having regular BM's with bowel regimen.  - vomiting episode x 1 at home. Anti-emetics ordered PRN.  - KUB repeated 1/14/18 given increased abdominal pain- mild, generalized bowel distention suggesting ileus.  Diet changed to clears as tolerated for bowel rest .  - SHANE tube intermittent re-placed several times this admission but it was poorly tolerated due to persistent N/V--last SHANE tube d/c'd 2/23  - chronic constipation, Cont bowel regimen.   - 2/24 changes: Increased Reglan to 10 mg, D/C atovaquone, transitioned cyclo to Prograf, change twyla to BID, check Keppra level  - GI consulted. EGD 3/6 as with continued nausea/vomiting with po intake. Appreciate GI recs.    - Cont to have nausea and vomiting w medications and eating.  Cont TPN.     - holding reglan 3/8 due to neurologic symptoms.        Anemia of chronic disease    - 1 U PRBC 3/12  - Monitor daily labs. H/H stable, transfuse PRN.         Delayed surgical wound healing    - Wd vac removed 1/12/18.    - Wound healing well.   - Changing dressing q Tuesday, last 3/13          Seizure    - 3/1, patient with stuttering speech, trouble recalling specific words when speaking. Does have hx of seizure (on keppra) previously while  on prograf. Switched to prograf over the weekend from cyclosporine. Neuro exam unremarkable.   - CT head without contrast obtained, unremarkable.   - In light of these symptoms, prograf d/c, restart cyclosporine 3/1.  - remains w/o seizure. Cont to have stutter/garbled speech/difficulty finding words.  Worse post EGD as sedation was given.  - MRI 3/6 unremarkable.  - EEG completed 3/8 - mild, generalized, non-specific cerebral dysfunction, no epileptiform activity  - monitor closely. VSS.        At risk for opportunistic infections    - see other cytomegaloviral disease  - Pentam given 2/24.  - continue isavu for complicated post-op course        Long-term use of immunosuppressant medication    - Maintenance IS with cyclosporine --> transitioned to Prograf 2/24 for continued nausea but did not tolerate from neuro standpoint.  PT IS NOT A CANDIDATE FOR TACROLIMUS.    - Transitioned back to cyclosporine 3/1  - D/c cyclosporine, started rapa & stress dose steroids 3/10 for continued confusion  - Mental status now improved, retrial cyclosporine 3/13, d/c rapa 3/13  - Transition back to prednisone taper from stress dose steroids 3/15        Prophylactic immunotherapy    - See long term use of immunosuppression.         Acute renal failure with tubular necrosis    - HD resumed on previous admission. Now anuric and dialyzes M-W-F.  - Nephrology following.    - Goal is for kidney txp in future- currently not a candidate due to health status.            VTE Risk Mitigation         Ordered     heparin (porcine) injection 1,000 Units  As needed (PRN)     Route:  Intra-Catheter        03/12/18 1134     heparin (porcine) injection 5,000 Units  Every 8 hours     Route:  Subcutaneous        03/08/18 1622     Medium Risk of VTE  Once      01/11/18 0351     Place sequential compression device  Until discontinued      01/11/18 0351          The patients clinical status was discussed at multidisplinary rounds, involving transplant  surgery, transplant medicine, pharmacy, nursing, nutrition, and social work    Discharge Planning: not stable for discharge at this time.  Monitor kidney function --> may need outpt HD  Monitor HH needs vs rehab for deconditioned status      Shereen Mcguire DNP  Liver Transplant  [facility]

## 2018-03-16 NOTE — PROGRESS NOTES
HD treatment started. No complications with access to right chest wall catheter. Lines secured and telemetry in place. No complaints of discomfort at this time.

## 2018-03-16 NOTE — PROGRESS NOTES
Patient returned to HD per stretcher by transport aide.   No acute distress noted. VSS. Will continue to monitor.

## 2018-03-16 NOTE — PLAN OF CARE
Problem: Patient Care Overview  Goal: Plan of Care Review  Outcome: Ongoing (interventions implemented as appropriate)  HD treatment complete. Duration of treatment 3 hours and 3.5 L removed. Treatment was tolerated well and no complications with access to right chest wall catheter. Catheter flushed with NS and locked with heparin. Capped and taped.

## 2018-03-16 NOTE — ASSESSMENT & PLAN NOTE
- Poor PO intake since transplant requiring short course of TPN during previous hospital stay.   - Dietary consulted. Will need to closely monitor PO intake.   - supplements also ordered, refusing to drink  - SHANE with tube feeding start 1/16. Stopped 1/17/18, was not tolerating. Shane removed 1/20.  - prealbumin 7 on 1/15 --> prealbumin 10 on 3/12, monitoring weekly  - TPN started 1/16/18.   - GI consulted - EGD reviewed, increased ppi bid, miralax bid, and metamucil bid, decreased narcotics.  - Poorly tolerated TF, had worsening nausea. Not tolerating well due to nausea.   - small bowel follow through with normal findings.   - consulted gi for possible gj tube placement, holding off on this for now as with ongoing fevers.  - retrial marinol 2/22 as pt reports that's the only thing helping his appetite, in the evening 2/2 concern for possible drowsiness --> marinol d/c due to neuro symptoms and sedation  - Pt then developed persistent N/V again and TPN started 2/24. Cont TPN for now.   - EGD 3/6 with normal esophagus    - congested, erythematous and nodular mucosa in the stomach.    - biliary stent in the duodenum    - one duodenal ulcer with a clean ulcer base (Emir Class III).    - biopsies taken in the duodenal bulb and second and third portion of the duodenum.  - Hold Reglan for neuro symptoms.  - Continue TPN, tolerating diet, but has minimal appetite, eating 25% 2/3 meals  - Calorie count initiated x 48 hours to reassess TPN and assess need for TF

## 2018-03-16 NOTE — ASSESSMENT & PLAN NOTE
- Started on vanc/cefepime for ERCP then transitioned to vanc/zosyn.  - Blood cultures sent, prelim NGTD. Repeat blood cultures and chest xray today 2/10.  - thoracentesis 2/1 - fluid negative for infection.  - now cmv positive - started treatment 2/5/18. CMV PCR 2/15 negative  - taken to OR 2/7 for exp lap - fluid collections drained, cultures NGTD.  - CT A/P 2/15 with no evidence of IA fluid infection. Did show R pleural effusion s/p thoracentesis, cell count negative for infection, cx pending.  - Repeat blood cx 2/15 prelim NGTD  - Bilirubin remains elevated. Concerning for cholangitis. Antibiotics broadened 2/17. ID re consulted.   - ERCP  2/19, overall unremarkable findings. Did place new larger stents. Will need repeat ERCP in 10 weeks. Bilirubin is stable post ERCP.  - Tmax 101 2/21 PM again ID following. Plan to transition vanc/zosyn to ertapenem 2/22 as no improvement noted.   - Erta started 2/22.  - CT A/P obtained 2/26 with focal/loculated region of fluid in the lower right abdomen measuring approximately 8.7 x 5.5 cm.   - IR attempted drainage on fluid collection on 2/27, however, per IR,there was limited fluid interposed with bowel in the RLQ with no safe pocket seen for drainage.  - Tagged WBC scan 2/27, negative results.  - Remained with intermittent fevers without clear infection source.   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 EBV PCR negative. Adenovirus negative. Repeat CMV PCR negative.    - Back pain and abdominal pain persists. Last BM 3/13 with brown bomb.   - ID following. Cont Ertapenem dc'd 3/7 - due to AMS --> Tmax 101.1 overnight 3/9 - Cefepime/vanc/flagyl.  Monitor.   - Remains afebrile x several days  - Bx with cholestatic hepatitis --> couldn't cx secondary to EUS contamination --> will cx next bx  - Plan to continue abx x 14 days to treat cholangitis --> stop date 3/23.  - Bandemia noted on labs 3/14 was 6 then 3/15 and 3/16 was 7.0, continue to monitor.

## 2018-03-16 NOTE — PLAN OF CARE
Problem: Physical Therapy Goal  Goal: Physical Therapy Goal  Goals to be met by: 3/19/18     Patient will increase functional independence with mobility by performin. Supine to sit with Modified [place]. - GOAL MET  2. Sit to stand transfer with Minimal Assistance. - GOAL MET  3. Bed to chair transfer with Contact Guard Assistance using Rolling Walker. - GOAL MET  4. Gait  x 150 feet with Contact Guard Assistance using Rolling Walker. - GOAL MET    5. Sit to supine with Modified [place].  6. Lower extremity exercise program x 20 reps per handout, with assistance as needed.  7. Pt to perf 10x sit<>stand: 1 min 18 sec, to demonstrate improvements in B LE mm strength.  8. Pt to perf 6MWT: amb 566', to demonstrate a clinically significant improvement in aerobic capacity.    9. Pt to amb 150' with DOC YANCEY.      Outcome: Ongoing (interventions implemented as appropriate)  Progressing well towards goals    William Yost DPT  3/16/2018

## 2018-03-16 NOTE — PLAN OF CARE
Problem: Patient Care Overview  Goal: Plan of Care Review  Outcome: Ongoing (interventions implemented as appropriate)  - Pt admitted 1/11/18 with abdominal pain, N/V, and fever; pt is a liver transplant recipient from 10/19/17 for alcoholic hepatitis.  - Pt has had a complicated postoperative course which includes ATN with HD 3x/week, persistent fevers, very poor oral intake, neuro status changes, and other complications.  - Pt will go to dialysis this morning. Recently he has been getting 3-4 L removed during HD. Non-dialyzable morning meds were given early. Pt is a candidate for a kidney transplant in the future.  - Pt is on TPN at 48 mL/hr and nocturnal lipids at 20.8 mL/hr. Pt eats 0-25% of meals. A 48-hour calorie count was started yesterday to assess oral intake. Pt was previously on tube feeds (NG tube); J-tube placement with home tube feeds is the current Dietician recommendation for pt. Pt was previously on scheduled Reglan and Marinol to promote appetite but these were held due to neuro changes.  - IV antibiotics: Flagyl q8h, cefepime q12h, Cresemba q24h.  - Pt requested pain medication twice overnight for abdominal pain. Pt received oxycodone 5 mg PO.  - Pt was switched from Prograf to cyclosporine after transplant due to seizure activity; pt was then re-tried on Prograf recently which resulted in slurred speech and other changes. Pt was switched to Rapamune and then back to cyclosporine.  - Pt has mother at bedside. Will continue to monitor.

## 2018-03-16 NOTE — ASSESSMENT & PLAN NOTE
- CXR in ER with large right pleural effusion with subjective c/o worsening SOB.  - Multiple Thora's this admission, all non-infectious: 1/11, 1/19, 1/31, 2/16  - CT A/P 2/26 with large R pleural effusion, may need repeat thora if fevers reoccur.    - complaining of sob, obtain chest xray, trial dose of diamox 3/14 with minimal UOP  - chest xray with large pleural effusion 3/13 --> may need thora soon if becomes symptomatic or develops fever  - Fluid removal with HD

## 2018-03-16 NOTE — ASSESSMENT & PLAN NOTE
- 3/1, patient with stuttering speech, trouble recalling specific words when speaking. Does have hx of seizure (on keppra) previously while on prograf. Switched to prograf over the weekend from cyclosporine. Neuro exam unremarkable.   - CT head without contrast obtained, unremarkable.   - In light of these symptoms, prograf d/c, restart cyclosporine 3/1.  - remains w/o seizure. Cont to have stutter/garbled speech/difficulty finding words.  Worse post EGD as sedation was given.  - MRI 3/6 unremarkable.  - EEG completed 3/8 - mild, generalized, non-specific cerebral dysfunction, no epileptiform activity  - monitor closely. VSS.

## 2018-03-16 NOTE — SUBJECTIVE & OBJECTIVE
Interval History: No acute change today in neuro status. Afebrile. Still with nausea and abdominal pain. Appetite present again.    Review of Systems   Constitutional: Negative for activity change, appetite change, chills, fatigue and fever.   HENT: Negative for congestion and facial swelling.    Eyes: Negative for pain, discharge and visual disturbance.   Respiratory: Negative for cough, chest tightness, shortness of breath and wheezing.    Cardiovascular: Negative for chest pain, palpitations and leg swelling.   Gastrointestinal: Positive for abdominal distention, abdominal pain and nausea. Negative for constipation and vomiting.   Endocrine: Negative.    Genitourinary:        Anuric   Musculoskeletal: Negative for arthralgias, back pain and myalgias.   Skin: Positive for wound. Negative for color change and rash.   Allergic/Immunologic: Positive for immunocompromised state.   Neurological: Negative for dizziness, seizures, weakness and headaches.   Psychiatric/Behavioral: Negative for confusion, decreased concentration and dysphoric mood. The patient is not nervous/anxious.    All other systems reviewed and are negative.      Objective:     Vital Signs (Most Recent):  Temp: 99.2 °F (37.3 °C) (02/17/18 1542)  Pulse: 90 (02/17/18 1542)  Resp: 16 (02/17/18 1542)  BP: 135/85 (02/17/18 1542)  SpO2: 95 % (02/17/18 1542) Vital Signs (24h Range):  Temp:  [98.6 °F (37 °C)-101.2 °F (38.4 °C)] 99.2 °F (37.3 °C)  Pulse:  [84-92] 90  Resp:  [16-20] 16  SpO2:  [92 %-96 %] 95 %  BP: (128-137)/(75-85) 135/85     Weight: 60.2 kg (132 lb 11.5 oz)  Body mass index is 21.42 kg/m².    Estimated Creatinine Clearance: 26.8 mL/min (A) (based on SCr of 3.5 mg/dL (H)).    Physical Exam   Constitutional: He is oriented to person, place, and time. He appears well-developed. No distress.   Temporal and distal extremity muscle wasting   HENT:   Head: Normocephalic and atraumatic.   Mouth/Throat: No oropharyngeal exudate.   dylan tube   Eyes:  EOM are normal. Pupils are equal, round, and reactive to light. No scleral icterus.   Neck: Normal range of motion. Neck supple. No JVD present. No thyromegaly present.   Cardiovascular: Normal rate, regular rhythm, normal heart sounds and intact distal pulses.    No murmur heard.  Pulmonary/Chest: Effort normal. No respiratory distress. He has no wheezes. He exhibits no tenderness.   Diminished to RLL   Abdominal: Soft. Bowel sounds are normal. He exhibits ascites. He exhibits no distension. There is tenderness. There is no rebound and no guarding.   Dressing to chevron.  Wd vac removed 1/12/18  Dependent edema present R flank; surrounding erythema   Musculoskeletal: Normal range of motion. He exhibits no tenderness. Edema: 2+ LE edema.   Neurological: He is alert and oriented to person, place, and time. He has normal reflexes.   Skin: Skin is warm and dry. He is not diaphoretic. No erythema.   Psychiatric: His mood appears not anxious.   Nursing note and vitals reviewed.      Significant Labs:   CBC:     Recent Labs  Lab 02/16/18  0436 02/17/18  0343   WBC 2.85* 2.23*   HGB 8.8* 7.8*   HCT 26.8* 23.9*    134*     CMP:     Recent Labs  Lab 02/16/18  0436 02/17/18  0343   * 137   K 4.0 3.8    105   CO2 23 23   GLU 84 81   BUN 23* 13   CREATININE 5.1* 3.5*   CALCIUM 9.5 8.8   PROT 5.6* 5.1*   ALBUMIN 2.0* 1.8*   BILITOT 2.2* 2.1*   ALKPHOS 125 111   AST 25 18   ALT <5* <5*   ANIONGAP 11 9   EGFRNONAA 14.2* 22.4*       Significant Imaging: I have reviewed all pertinent imaging results/findings within the past 24 hours.     CT CAP:   Post surgical changes prior hepatic transplant.  Percutaneous surgical drain with notable decrease in size of fluid collection in lower abdomen.  Small amount of fluid tracking elsewhere within the abdomen and pelvis.  Large volume right pleural fluid, increased in size since prior exam, with associated right lower lobe atelectasis.  Diffuse body wall edema.  Small  pericardial effusion.    Microbiology:  1/11 blood cx: negative  1/11 pleural fluid cx: negative  1/11 ascites cx: negative  1/17 blood cx: negative  1/18 blood cx: negative  1/19 pleural fluid cx: NGTD  1/19 ascites cx: NGTD  2/1 RVP negative  2/7 OR cx: NGTD  2/11 blood cx: negative  2/15 blood cx: negative  2/16 pleural fluid cx: negative

## 2018-03-16 NOTE — ASSESSMENT & PLAN NOTE
- see other cytomegaloviral disease  - Pentam given 2/24.  - continue isavu for complicated post-op course

## 2018-03-16 NOTE — ASSESSMENT & PLAN NOTE
- Post op course complicated by fevers and hyperbilirubinemia, AST/ALT stable.  - see biliary stricture of transplanted liver  - Liver US 1/8 showed 3.9 cm complex fluid collection anterior to right lobe and moderate nonspecific complex ascites inferior to transplant.   - IR placed drain 1/11/18, cell count negative for infection.  - Liver biopsy 1/23 without rejection.  - Paracentesis 3/6 negative for infection, wbc 28 segs 3%.  - ERCP, EUS biopsy 3/9/18 - no rejection, mild cholestatic hepatitis seen.

## 2018-03-16 NOTE — ASSESSMENT & PLAN NOTE
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  - Tbili with increase prompting ERCP 1/30 with stones and sludge, stents placed.  - Repeat ERCP 2/19 as was having recurrent fevers with bilirubin stuck at 2.0-2.2 with overall unremarkable findings seen. Did place new larger stents.   - US 3/2 stent in CBD  - ERCP/EUS 3/9 with mental stent placed in bile duct, stones removed  - Tbili remains stable

## 2018-03-16 NOTE — PT/OT/SLP PROGRESS
"Physical Therapy Treatment    Patient Name:  Jhonny Diana   MRN:  [id number]    Recommendations:     Discharge Recommendations:  outpatient PT   Discharge Equipment Recommendations: bath bench   Barriers to discharge: None    Assessment:     Jhonny Diana is a 27 y.o. male admitted with a medical diagnosis of Fever.  He presents with the following impairments/functional limitations:  weakness, impaired functional mobilty, gait instability, impaired endurance, impaired balance, impaired self care skills, decreased lower extremity function, impaired cardiopulmonary response to activity.  Pt fatigue following HD in AM but willing to participate c therapy.  Pt tolerated treatment session well c min c/o fatigue.  Pt performed transfer c I and gait c CGA.  Pt amb 250ft c CGA using WC to push and assistance c IV pole management - decreased gait speed, mild unsteadiness noted, no LOB, min c/o fatigue.  Pt would benefit from continued skilled acute PT 4x/wk to improve functional mobility.      Rehab Prognosis:  good; patient would benefit from acute skilled PT services to address these deficits and reach maximum level of function.      Recent Surgery: Procedure(s) (LRB):  ERCP (N/A)  ULTRASOUND-ENDOSCOPIC-UPPER (N/A) 7 Days Post-Op    Plan:     During this hospitalization, patient to be seen 4 x/week to address the above listed problems via gait training, therapeutic activities, therapeutic exercises, neuromuscular re-education  · Plan of Care Expires:  04/05/18   Plan of Care Reviewed with: patient    Subjective     Communicated with RN prior to session.  Patient found up in chair and mother present upon PT entry to room, agreeable to treatment.      Chief Complaint: fatigue  Patient comments/goals: "i'm pretty tired but I'll give it a try." - when asked if he wanted to participate c therapy  Pain/Comfort:  · Pain Rating 1: 0/10    Patients cultural, spiritual, Taoism conflicts given the current situation: " none    Objective:     Patient found with: central line, telemetry     General Precautions: Standard, fall   Orthopedic Precautions:N/A   Braces: N/A     Functional Mobility:  · Transfers:     · Sit to Stand:  independence with no AD  · Gait: 250ft c pushing WC CGA  · Balance: static standing (I); dynamic standing (SBA)      AM-PAC 6 CLICK MOBILITY  Turning over in bed (including adjusting bedclothes, sheets and blankets)?: 4  Sitting down on and standing up from a chair with arms (e.g., wheelchair, bedside commode, etc.): 4  Moving from lying on back to sitting on the side of the bed?: 4  Moving to and from a bed to a chair (including a wheelchair)?: 3  Need to walk in hospital room?: 3  Climbing 3-5 steps with a railing?: 3  Total Score: 21       Therapeutic Activities and Exercises:  Educated on PT role/POC; safety c mobility; amb in halls c assistance as needed.  Amb 250ft c pushing WC    Patient left up in chair with all lines intact, call button in reach, RN notified and mother present..    GOALS:    Physical Therapy Goals        Problem: Physical Therapy Goal    Goal Priority Disciplines Outcome Goal Variances Interventions   Physical Therapy Goal     PT/OT, PT Ongoing (interventions implemented as appropriate)     Description:  Goals to be met by: 3/19/18     Patient will increase functional independence with mobility by performin. Supine to sit with Modified [place]. - GOAL MET  2. Sit to stand transfer with Minimal Assistance. - GOAL MET  3. Bed to chair transfer with Contact Guard Assistance using Rolling Walker. - GOAL MET  4. Gait  x 150 feet with Contact Guard Assistance using Rolling Walker. - GOAL MET    5. Sit to supine with Modified [place].  6. Lower extremity exercise program x 20 reps per handout, with assistance as needed.  7. Pt to perf 10x sit<>stand: 1 min 18 sec, to demonstrate improvements in B LE mm strength.  8. Pt to perf 6MWT: amb 566', to demonstrate a clinically  significant improvement in aerobic capacity.    9. Pt to amb 150' with SBKELSEY RW.                       Time Tracking:     PT Received On: 03/16/18  PT Start Time: 1157     PT Stop Time: 1209  PT Total Time (min): 12 min     Billable Minutes: Gait Training 12 min    Treatment Type: Treatment  PT/PTA: PT           William Yost, PT  03/16/2018

## 2018-03-17 LAB
ALBUMIN SERPL BCP-MCNC: 2 G/DL
ALP SERPL-CCNC: 103 U/L
ALT SERPL W/O P-5'-P-CCNC: 6 U/L
ANION GAP SERPL CALC-SCNC: 10 MMOL/L
ANISOCYTOSIS BLD QL SMEAR: SLIGHT
AST SERPL-CCNC: 26 U/L
BASOPHILS # BLD AUTO: ABNORMAL K/UL
BASOPHILS NFR BLD: 0 %
BILIRUB SERPL-MCNC: 1.4 MG/DL
BUN SERPL-MCNC: 40 MG/DL
CALCIUM SERPL-MCNC: 8.5 MG/DL
CHLORIDE SERPL-SCNC: 106 MMOL/L
CO2 SERPL-SCNC: 24 MMOL/L
CREAT SERPL-MCNC: 2.6 MG/DL
CYCLOSPORINE BLD LC/MS/MS-MCNC: 146 NG/ML
DIFFERENTIAL METHOD: ABNORMAL
EOSINOPHIL # BLD AUTO: ABNORMAL K/UL
EOSINOPHIL NFR BLD: 1 %
ERYTHROCYTE [DISTWIDTH] IN BLOOD BY AUTOMATED COUNT: 20 %
EST. GFR  (AFRICAN AMERICAN): 37.1 ML/MIN/1.73 M^2
EST. GFR  (NON AFRICAN AMERICAN): 32.1 ML/MIN/1.73 M^2
GLUCOSE SERPL-MCNC: 94 MG/DL
HCT VFR BLD AUTO: 25.8 %
HGB BLD-MCNC: 8.4 G/DL
IMM GRANULOCYTES # BLD AUTO: ABNORMAL K/UL
IMM GRANULOCYTES NFR BLD AUTO: ABNORMAL %
LYMPHOCYTES # BLD AUTO: ABNORMAL K/UL
LYMPHOCYTES NFR BLD: 27 %
MAGNESIUM SERPL-MCNC: 1.8 MG/DL
MCH RBC QN AUTO: 32.1 PG
MCHC RBC AUTO-ENTMCNC: 32.6 G/DL
MCV RBC AUTO: 99 FL
METAMYELOCYTES NFR BLD MANUAL: 1 %
MONOCYTES # BLD AUTO: ABNORMAL K/UL
MONOCYTES NFR BLD: 6 %
NEUTROPHILS NFR BLD: 59 %
NEUTS BAND NFR BLD MANUAL: 6 %
NRBC BLD-RTO: 0 /100 WBC
OVALOCYTES BLD QL SMEAR: ABNORMAL
PHOSPHATE SERPL-MCNC: 2.1 MG/DL
PLATELET # BLD AUTO: 135 K/UL
PMV BLD AUTO: 11.5 FL
POIKILOCYTOSIS BLD QL SMEAR: SLIGHT
POLYCHROMASIA BLD QL SMEAR: ABNORMAL
POTASSIUM SERPL-SCNC: 3.7 MMOL/L
PROT SERPL-MCNC: 5.9 G/DL
RBC # BLD AUTO: 2.62 M/UL
SODIUM SERPL-SCNC: 140 MMOL/L
WBC # BLD AUTO: 6.61 K/UL

## 2018-03-17 PROCEDURE — A4217 STERILE WATER/SALINE, 500 ML: HCPCS | Performed by: TRANSPLANT SURGERY

## 2018-03-17 PROCEDURE — 25000003 PHARM REV CODE 250: Performed by: PHYSICIAN ASSISTANT

## 2018-03-17 PROCEDURE — 63600175 PHARM REV CODE 636 W HCPCS: Performed by: TRANSPLANT SURGERY

## 2018-03-17 PROCEDURE — 25000003 PHARM REV CODE 250: Performed by: NURSE PRACTITIONER

## 2018-03-17 PROCEDURE — 63600175 PHARM REV CODE 636 W HCPCS: Performed by: NURSE PRACTITIONER

## 2018-03-17 PROCEDURE — 25000003 PHARM REV CODE 250: Performed by: STUDENT IN AN ORGANIZED HEALTH CARE EDUCATION/TRAINING PROGRAM

## 2018-03-17 PROCEDURE — 63600175 PHARM REV CODE 636 W HCPCS: Mod: JG | Performed by: SURGERY

## 2018-03-17 PROCEDURE — 20600001 HC STEP DOWN PRIVATE ROOM

## 2018-03-17 PROCEDURE — 99233 SBSQ HOSP IP/OBS HIGH 50: CPT | Mod: ,,, | Performed by: NURSE PRACTITIONER

## 2018-03-17 PROCEDURE — 25000003 PHARM REV CODE 250: Performed by: TRANSPLANT SURGERY

## 2018-03-17 PROCEDURE — 80158 DRUG ASSAY CYCLOSPORINE: CPT

## 2018-03-17 PROCEDURE — 80053 COMPREHEN METABOLIC PANEL: CPT

## 2018-03-17 PROCEDURE — 85007 BL SMEAR W/DIFF WBC COUNT: CPT

## 2018-03-17 PROCEDURE — S0030 INJECTION, METRONIDAZOLE: HCPCS | Performed by: NURSE PRACTITIONER

## 2018-03-17 PROCEDURE — 83735 ASSAY OF MAGNESIUM: CPT

## 2018-03-17 PROCEDURE — 84100 ASSAY OF PHOSPHORUS: CPT

## 2018-03-17 PROCEDURE — B4185 PARENTERAL SOL 10 GM LIPIDS: HCPCS | Performed by: TRANSPLANT SURGERY

## 2018-03-17 PROCEDURE — 63600175 PHARM REV CODE 636 W HCPCS: Performed by: PHYSICIAN ASSISTANT

## 2018-03-17 PROCEDURE — 25000003 PHARM REV CODE 250: Performed by: SURGERY

## 2018-03-17 PROCEDURE — 85027 COMPLETE CBC AUTOMATED: CPT

## 2018-03-17 RX ADMIN — DOCUSATE SODIUM 100 MG: 100 CAPSULE, LIQUID FILLED ORAL at 08:03

## 2018-03-17 RX ADMIN — OXYCODONE HYDROCHLORIDE 5 MG: 5 TABLET ORAL at 12:03

## 2018-03-17 RX ADMIN — METRONIDAZOLE 500 MG: 500 INJECTION, SOLUTION INTRAVENOUS at 05:03

## 2018-03-17 RX ADMIN — CYCLOSPORINE 100 MG: 100 CAPSULE, LIQUID FILLED ORAL at 05:03

## 2018-03-17 RX ADMIN — METRONIDAZOLE 500 MG: 500 INJECTION, SOLUTION INTRAVENOUS at 01:03

## 2018-03-17 RX ADMIN — LEVETIRACETAM 500 MG: 100 SOLUTION ORAL at 09:03

## 2018-03-17 RX ADMIN — OXYCODONE HYDROCHLORIDE 5 MG: 5 TABLET ORAL at 06:03

## 2018-03-17 RX ADMIN — SODIUM CHLORIDE: 234 INJECTION INTRAMUSCULAR; INTRAVENOUS; SUBCUTANEOUS at 09:03

## 2018-03-17 RX ADMIN — LEVOTHYROXINE SODIUM 75 MCG: 75 TABLET ORAL at 05:03

## 2018-03-17 RX ADMIN — CEFEPIME 1 G: 1 INJECTION, POWDER, FOR SOLUTION INTRAMUSCULAR; INTRAVENOUS at 05:03

## 2018-03-17 RX ADMIN — SOYBEAN OIL 250 ML: 20 INJECTION, SOLUTION INTRAVENOUS at 09:03

## 2018-03-17 RX ADMIN — BISACODYL 10 MG: 5 TABLET, COATED ORAL at 08:03

## 2018-03-17 RX ADMIN — URSODIOL 300 MG: 300 CAPSULE ORAL at 08:03

## 2018-03-17 RX ADMIN — SODIUM CHLORIDE: 9 INJECTION, SOLUTION INTRAVENOUS at 01:03

## 2018-03-17 RX ADMIN — DOCUSATE SODIUM 100 MG: 100 CAPSULE, LIQUID FILLED ORAL at 09:03

## 2018-03-17 RX ADMIN — OXYCODONE HYDROCHLORIDE 5 MG: 5 TABLET ORAL at 05:03

## 2018-03-17 RX ADMIN — METRONIDAZOLE 500 MG: 500 INJECTION, SOLUTION INTRAVENOUS at 09:03

## 2018-03-17 RX ADMIN — CYCLOSPORINE 100 MG: 100 CAPSULE, LIQUID FILLED ORAL at 08:03

## 2018-03-17 RX ADMIN — OMEPRAZOLE 40 MG: 40 CAPSULE, DELAYED RELEASE ORAL at 07:03

## 2018-03-17 RX ADMIN — BISACODYL 10 MG: 10 SUPPOSITORY RECTAL at 10:03

## 2018-03-17 RX ADMIN — LEVETIRACETAM 500 MG: 100 SOLUTION ORAL at 08:03

## 2018-03-17 RX ADMIN — URSODIOL 300 MG: 300 CAPSULE ORAL at 09:03

## 2018-03-17 RX ADMIN — OXYCODONE HYDROCHLORIDE 5 MG: 5 TABLET ORAL at 01:03

## 2018-03-17 RX ADMIN — PREDNISONE 20 MG: 10 TABLET ORAL at 08:03

## 2018-03-17 RX ADMIN — HEPARIN SODIUM 5000 UNITS: 5000 INJECTION, SOLUTION INTRAVENOUS; SUBCUTANEOUS at 01:03

## 2018-03-17 RX ADMIN — SODIUM CHLORIDE: 9 INJECTION, SOLUTION INTRAVENOUS at 11:03

## 2018-03-17 NOTE — PLAN OF CARE
Problem: Patient Care Overview  Goal: Plan of Care Review  Outcome: Ongoing (interventions implemented as appropriate)  Pt is AAOx3.Family at bedside. Pt is in good spirits.  Standard precautions maintained. TPN @48.   Pt turns independently, pt is aware of bony area and pressure reduction positions Pt remains injury and fall free, non skid footwear donned, call light within reach, personal items within reach, bed in low/locked position, pt able to voice needs all needs voiced have been met at this time.

## 2018-03-17 NOTE — ASSESSMENT & PLAN NOTE
- detected at 370 on CMV PCR 2/1.  - case discussed with ID and will hold off on treatment at this time given low WBC.   - undetected CMV PCR 2/8, 2/15, 2/22, 3/8   - continue Valcyte 200 mg every Mon, Wed, Fri.  - Per ID, valcyte dose decreased to ppx dose on 2/18--Valcyte d/c'd 2/25  - Repeat scope 3/6 to assess persistent N/V and possible CMV causing these symptoms. Biopsies thus far normal, cmv stains negative.  - continue to monitor weekly CMV PCR's, last PCR neg from 3/15.

## 2018-03-17 NOTE — SUBJECTIVE & OBJECTIVE
Scheduled Meds:   bisacodyl  10 mg Oral Daily    ceFEPime (MAXIPIME) IVPB  1 g Intravenous Q12H    cycloSPORINE modified  100 mg Oral BID    docusate sodium  100 mg Oral BID    epoetin maurisio (PROCRIT) injection  10,000 Units Intravenous Every Mon, Wed, Fri    ergocalciferol  50,000 Units Oral Q7 Days    fat emulsion 20%  250 mL Intravenous Once    heparin (porcine)  5,000 Units Subcutaneous Q8H    custom IVPB builder   Intravenous Q24H    levetiracetam oral soln  500 mg Oral BID    levothyroxine  75 mcg Oral Before breakfast    metronidazole  500 mg Intravenous Q8H    multivitamin  1 tablet Oral Daily    omeprazole  40 mg Oral QAM    polyethylene glycol  17 g Oral BID    predniSONE  20 mg Oral Daily    psyllium husk (aspartame)  3.4 g Oral BID    ursodiol  300 mg Oral BID     Continuous Infusions:   TPN ADULT CENTRAL LINE CUSTOM 48 mL/hr at 03/16/18 2220    TPN ADULT CENTRAL LINE CUSTOM       PRN Meds:sodium chloride, sodium chloride, sodium chloride 0.9%, sodium chloride 0.9%, sodium chloride 0.9%, sodium chloride 0.9%, acetaminophen, albuterol-ipratropium 2.5mg-0.5mg/3mL, bisacodyl, dextrose 50%, dextrose 50%, diphenhydrAMINE-zinc acetate 1-0.1%, glucagon (human recombinant), glucose, glucose, heparin (porcine), naloxone, ondansetron, ondansetron, oxyCODONE, oxyCODONE, prochlorperazine, simethicone, sodium chloride 0.9%, sodium chloride 0.9%    Review of Systems   Constitutional: Positive for activity change, appetite change, fatigue and fever.   HENT: Negative.  Negative for facial swelling.    Eyes: Negative.    Respiratory: Negative.  Negative for apnea, chest tightness, shortness of breath and wheezing.    Cardiovascular: Negative.  Negative for chest pain, palpitations and leg swelling.   Gastrointestinal: Positive for abdominal distention, abdominal pain and constipation. Negative for diarrhea, nausea and vomiting.   Genitourinary: Positive for decreased urine volume. Negative for  difficulty urinating, dysuria, hematuria and urgency.   Musculoskeletal: Negative.  Negative for back pain, gait problem, neck pain and neck stiffness.   Skin: Negative.  Negative for color change and pallor.   Allergic/Immunologic: Positive for immunocompromised state.   Neurological: Negative.  Negative for dizziness, seizures, weakness, light-headedness and headaches.   Psychiatric/Behavioral: Positive for dysphoric mood. Negative for behavioral problems, confusion, hallucinations, sleep disturbance and suicidal ideas. The patient is not nervous/anxious.         Tearful     Objective:     Vital Signs (Most Recent):  Temp: 99 °F (37.2 °C) (03/17/18 0848)  Pulse: 75 (03/17/18 0846)  Resp: 16 (03/17/18 0840)  BP: 129/84 (03/17/18 0846)  SpO2: 95 % (03/17/18 0846) Vital Signs (24h Range):  Temp:  [98.5 °F (36.9 °C)-99.7 °F (37.6 °C)] 99 °F (37.2 °C)  Pulse:  [65-93] 75  Resp:  [16-18] 16  SpO2:  [94 %-98 %] 95 %  BP: (129-151)/(76-93) 129/84     Weight: 74.8 kg (164 lb 14.5 oz)  Body mass index is 26.62 kg/m².    Intake/Output - Last 3 Shifts       03/15 0700 - 03/16 0659 03/16 0700 - 03/17 0659 03/17 0700 - 03/18 0659    P.O. 620      Other  600     IV Piggyback 450 200     TPN 1354.5      Total Intake(mL/kg) 2424.5 (32.4) 800 (10.7)     Urine (mL/kg/hr) 150 (0.1) 585 (0.3) 275 (1)    Emesis/NG output 0 (0)  0 (0)    Other 0 (0) 4108 (2.3) 0 (0)    Stool 0 (0) 0 (0) 0 (0)    Blood 0 (0)  0 (0)    Total Output 150 4693 275    Net +2274.5 -3893 -275           Urine Occurrence 1 x  0 x    Stool Occurrence 0 x 0 x 0 x    Emesis Occurrence 0 x  0 x          Physical Exam   Constitutional: He is oriented to person, place, and time. He appears well-developed.   Hand and temporal muscle wasting   HENT:   Head: Normocephalic.   Eyes: Pupils are equal, round, and reactive to light. No scleral icterus.   Cardiovascular: Normal rate, regular rhythm, normal heart sounds and intact distal pulses.    Pulmonary/Chest: Effort  normal. No respiratory distress. He has decreased breath sounds in the right lower field and the left lower field. He has no wheezes. He has no rhonchi. He has no rales.   L base, decreased BS R base   Abdominal: Soft. Bowel sounds are normal. He exhibits distension. There is no tenderness.   Pain with palpation   Musculoskeletal: Normal range of motion. He exhibits edema (generalized).   Neurological: He is alert and oriented to person, place, and time.   Skin: Skin is warm and dry.   Psychiatric: He has a normal mood and affect. His behavior is normal. Judgment and thought content normal.   Nursing note and vitals reviewed.      Laboratory:  Immunosuppressants         Stop Route Frequency     cycloSPORINE modified (NEORAL) capsule 100 mg      -- Oral 2 times daily     cycloSPORINE modified (NEORAL) 25 MG capsule      03/09 1714       cycloSPORINE modified (NEORAL) 100 MG capsule      03/09 1714       cycloSPORINE modified (NEORAL) 100 MG capsule      03/09 0514       cycloSPORINE modified (NEORAL) 25 MG capsule      03/09 0514          CBC:   Recent Labs  Lab 03/17/18  0530   WBC 6.61   RBC 2.62*   HGB 8.4*   HCT 25.8*   *   MCV 99*   MCH 32.1*   MCHC 32.6     CMP:   Recent Labs  Lab 03/17/18  0530   GLU 94   CALCIUM 8.5*   ALBUMIN 2.0*   PROT 5.9*      K 3.7   CO2 24      BUN 40*   CREATININE 2.6*   ALKPHOS 103   ALT 6*   AST 26   BILITOT 1.4*     Coagulation:   Recent Labs  Lab 03/12/18  1004   INR 1.1     Labs within the past 24 hours have been reviewed.    Diagnostic Results:  None

## 2018-03-17 NOTE — ASSESSMENT & PLAN NOTE
- h/o constipation, reports having regular BM's with bowel regimen.  - vomiting episode x 1 at home. Anti-emetics ordered PRN.  - KUB repeated 1/14/18 given increased abdominal pain- mild, generalized bowel distention suggesting ileus.  Diet changed to clears as tolerated for bowel rest .  - SHANE tube intermittent re-placed several times this admission but it was poorly tolerated due to persistent N/V--last SHANE tube d/c'd 2/23  - chronic constipation, Cont bowel regimen.   - 2/24 changes: Increased Reglan to 10 mg, D/C atovaquone, transitioned cyclo to Prograf, change twyla to BID, check Keppra level  - GI consulted. EGD 3/6 as with continued nausea/vomiting with po intake. Appreciate GI recs.    - Cont to have nausea and vomiting w medications and eating.  Cont TPN.     - holding reglan 3/8 due to neurologic symptoms.   - If cont with GI symptoms then may need to consider Flex sig/ C-scope to further eval.

## 2018-03-17 NOTE — PLAN OF CARE
"Problem: Patient Care Overview  Goal: Plan of Care Review  Outcome: Ongoing (interventions implemented as appropriate)  Pt AAO x 4. Pt needs assistance out of bed. Pt "sleepy" today and spent much of day sleeping. Pt easily aroused. Pt has TPN at 48 ml/hr. Pt has poor appetite. Pt able to dinesh independently in bed. Low air loss mattress in place. Pain medication given PRN providing mild relief.   Pt had 120 calories from a novosource shake and 1 packet of benoprotein.   PRN suppository given today.   Pt free of falls and injury. Pt wearing non skid socks when out of bed, bed in lowest position, side rails up x 2, call light within reach.       "

## 2018-03-17 NOTE — PROGRESS NOTES
[facility]  Liver Transplant  Progress Note    Patient Name: Jhonny Diana  MRN: [id number]  Admission Date: 2018  Hospital Length of Stay: 65 days  Code Status: Full Code  Primary Care Provider: Primary Doctor No  Post-Operative Day: 149    ORGAN:   LIVER  Disease Etiology: Acute Alcoholic Hepatitis  Donor Type:    - Brain Death  CDC High Risk:   No  Donor CMV Status:   Donor CMV Status: Positive  Donor HBcAB:   Negative  Donor HCV Status:   Negative  Whole or Partial: Whole Liver  Biliary Anastomosis: End to End  Arterial Anatomy: Standard  Subjective:     History of Present Illness:  Jhonny Diana is a 28 y/o male with past medical history of alcoholic cirrhosis.  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 Found to have peritonitis in November (WBC 5000, 75% PNM) neg for bile leak. He was treated initially with vanc/cefepime. Repeat cell counts  with some improvement (WBC 1400, 45% PNM). He has undergone multiple paracenteses as well as abscess drainage of perihepatic fluid collections and treated with antimicrobial therapy but no positive cultures. Of note, biliary stricture also identified and ERCP performed on 2017 with sphincterotomy and biliary stent placed. Liver tests still have not normalized despite intervention, bilirubin and AP remain elevated. Other pertinent PMH current wound vac in place 2/2 wound infection, malnutrition requiring TPN for short course and ongoing hypoalbuminemia, and seizure activity while on prograf and has since been switched to cyclosporine without reoccurrence.  He presented to the ER for fever, abdominal pain, and N/V. He reports fever (103) for 1  day prior. Overnight, he developed N/V, reports small amount of green emesis with new left sided pain. He also endorses worsening SOB with exertion. He was scheduled as an outpatient for follow up paracentesis and IR drainage of fluid collection. CXR in ER shows large pleural effusion with subsegmental atelectasis. Infectious work up initiated in ER. His ANC is 900. Broad spectrum antibiotics initiated in ED. At admit, he denied chest pain, palpitations, diarrhea, constipation, back pain, or any sick contacts.    Hospital Course:  Thoracentesis (1.2L off), Paracentesis (1.6L off), and IR drainage of fluid collection 1/11, all fluids negative for infection. ID consulted. Broad spectrum antibiotics d/c'd 1/15. Chronically malnourished with poor PO intake, prealbumin 7. Shane tube placed 1/15 for tube feedings.     ERCP 1/17 with sludge and a biliary stone which was removed and stent exchanged.  Remained with n/v; therefore, TF remained on hold and TPN continued.   Pt with fever s/p ERCP on 1/17 which continued until 1/19.  Vanc/cefepime restarted.  Blood cx 1/17 and 1/18 NGTD.  ID reconsulted.  Fungal markers sent.  CT C/A/P obtained.  With large R pleural effusion and ascites- both drained 1/19 and negative for infx per cell count. Pt afebrile 1/20. Liver biopsy 1/23 - without rejection.  EGD 1/25 - unremarkable for source of GI symptoms.  Resume diet along with other GI recommendations. SHANE tube placed for tube feeds.       2/22: Pt again spiked temp of 101 overnight, now down to 99. Resent Bcx overnight, prelim NGTD. Transitioned to Erta from vanc/zosyn per ID. Planning for tagged WBC scan on Monday. R sided abdominal erythema/dependent edema improving today.  TF have been on hold since 2/20. Will continue to hold today per pt request. Pt working on diet. KUB yesterday with significant stool and gas. Declines colace, miralax, metamucil, but willing to take daily bisacodyl. Pt agreeable to suppository today. If  suppository not successful can give mag citrate through dylan tonight. Will also retrial Marinol tonight as pt reported improvement in appetite previously. Will monitor closely for sedation.     2/23:  2 small BM 2/22.  Tolerated HD.  [place] removed.  Will attempt to eat this PM.      2/24:  Pt ate then vomited hamburger 2/23.  Will resume TPN.  D/C atovaquone, transition cyclo to Prograf, check Keppra level, increase Reglan to 0 mg, change twyla to BID for nausea.  Will give pentam today.    2/25:  TPN going well, pt ate 1/2 subway sandwhich without vomiting yesterday, will continue to monitor.  Plan to repeat EGD this week.  Pt with drop in H/h again and appears to be hemolyzing.  LDH elevated, hapto slightly low, LEE pending.  With neutropenia- Neupogen given.    2/26:  Pt with severe back pain s/p HD. Unclear if secondary to neupogen.  Repeat labs stable. Indium scan scheduled for 2/27- injection done today (back pain started prior). CT A/P obtained with fluid collection seen in pelvis.    2/27: Patient spiking fevers, 101. Indium scan, negative results. CT A/P obtained 2/26 with focal/loculated region of fluid in the lower right abdomen measuring approximately 8.7 x 5.5 cm. IR attempted drainage, however, per IR,there was  limited fluid interposed with bowel in the RLQ with no safe pocket seen for drainage.    2/28: Remains with low grade fevers. Neupogen given.     3/1: Patient with stuttering speech, trouble recalling specific words when speaking. Does have hx of seizure (on keppra) previously while on prograf. Switched to prograf 2/24 from cyclosporine. Neuro exam unremarkable. CT head without contrast obtained, unremarkable. In light of these symptoms, prograf d/c, cyclosporine restarted 3/1. Per transplant attending, held off on EGD at this time as concern anesthesia would put patient at further increase risk for seizure activity.     3/4: pt walked in room yesterday.  Had 1 BM.  No appetite still.  Cont TPN.   He reports swelling and back pain today.  He vomited after taking Oxy PO.  Fentanyl 12.5mg IV ordered.  Patient rested well afterward.  Remains afebrile.   ID following.   Cont Ertapenem.  Repeat blood and urine cx given continued malaise and stutter- blood cx prelim NGTD.  UA ok.  Urine cx pending.    Tentative plan for EGD on Monday to assess source of fevers/nausea/abdominal pain w bx for CMV and GVHD.  Will also reassess rather able to perform paracentesis.     3/5: no acute events overnight. Pt still not feeling well and with poor sleep overnight. Cont to have mumbling of speech at times but slightly better than before. Remains with abdominal pain but relieved with PO pain meds. Tolerated HD well today. Will plan for paracentesis and EGD tomorrow to assess for peritonitis and cause of persistent N/V respectively.     3/6: pt continues to have garbled/mumbled speech. EGD performed today and pt received sedation during procedure. Increased lethargy and dysarthria noted post procedure. VS stable. Paracentesis scheduled today as well to r/o peritonitis. Wbc count stable. Afebrile. Continue Ertapenem. HD yesterday and pt tolerated well, 2.5 L removed. Monitor.     3/7: MRI brain yesterday unremarkable. Blood cultures ngtd so far. Neurology consulted. EGD and Paracentesis yesterday. Para with 25 cc removed, wbc 28 segs 3%. Continue TPN. Monitor.    3/8: EUS and ERCP scheduled tomorrow. NPO after midnight. T bili continues trending up daily. Last ERCP on 2/19 with two stents placed. Ertapenem dc'd 3/7. Pt spiking temps today, tmax 100.9. Cefepime/vanc started. Oxycodone decreased to 5 mg q6h prn. Dc marinol and reglan for now. He continues to slur/stutter and have jerky movements throughout the day. Neurology following. EEG scheduled today. Monitor.     3/9: EUS/ERCP completed with choledoliathiasis.  Fever over night.  May need LP in future if mental status does not improve.      3/10- speech improved, neuro  suggesting lumbar puncture - schedule for Monday    3/11 - more lethargic, tmax 100.9, plan for lumbar puncture in am.    3/12 - mental status greatly improved, walking halls with PT, will hold off on LP for now.    3/13 - mental status remains stable, complains of pain to lower abdomen, bowel movement with suppository over night, KUB with non specific bowel gas pattern, would like to increase pain regimen - spoke with patient and mother this was counter intuitive as narcotics slow down the bowels, patient states understanding - will take colace religiously with increased pain medicine.  Also complaining of sob - chest xray and dose of diamox    3/14: Mentation good.  Continue cyclosporine.   Tolerated HD.  Decrease hydrocortisone dose once pt has cyclosporine level.  Transjug bx shows mild cholestatic hepatitis --> unable to cx since bx performed via EUS.  Will plan to obtain culture with next bx.    3/15: Pt feeling well.  Still reports abd pain which is slightly improved.  He feels eating is going well- calorie count initiated --> goal to decreased then D/c TPN.  Restart of TF discussed with pt and mom but will allow for a 48 hour calorie count.  Hydrocortisone transitioned back to prednisone taper.  Pt with 7 bands --> will continue to monitor closely for infection.    3/16: More fatigued today, feels sleepy after HD and walking in halls with PT. Very tearful, patient denied wanting to talk with someone about his mood and emotions and declined anti-depressive medications at this time. Still only eating 25% of 2/3 meals daily with minimal calories. Continue TPN/Lipids for now. Not interested in drinking boost supplements or tube feeding. Bands continue to be elevated at 7, continue to monitor.    3/17: Feeling well this am but remains with poor appetite. Afebrile. Cont with TPN for now.     Scheduled Meds:   bisacodyl  10 mg Oral Daily    ceFEPime (MAXIPIME) IVPB  1 g Intravenous Q12H    cycloSPORINE modified   100 mg Oral BID    docusate sodium  100 mg Oral BID    epoetin maurisio (PROCRIT) injection  10,000 Units Intravenous Every Mon, Wed, Fri    ergocalciferol  50,000 Units Oral Q7 Days    fat emulsion 20%  250 mL Intravenous Once    heparin (porcine)  5,000 Units Subcutaneous Q8H    custom IVPB builder   Intravenous Q24H    levetiracetam oral soln  500 mg Oral BID    levothyroxine  75 mcg Oral Before breakfast    metronidazole  500 mg Intravenous Q8H    multivitamin  1 tablet Oral Daily    omeprazole  40 mg Oral QAM    polyethylene glycol  17 g Oral BID    predniSONE  20 mg Oral Daily    psyllium husk (aspartame)  3.4 g Oral BID    ursodiol  300 mg Oral BID     Continuous Infusions:   TPN ADULT CENTRAL LINE CUSTOM 48 mL/hr at 03/16/18 2220    TPN ADULT CENTRAL LINE CUSTOM       PRN Meds:sodium chloride, sodium chloride, sodium chloride 0.9%, sodium chloride 0.9%, sodium chloride 0.9%, sodium chloride 0.9%, acetaminophen, albuterol-ipratropium 2.5mg-0.5mg/3mL, bisacodyl, dextrose 50%, dextrose 50%, diphenhydrAMINE-zinc acetate 1-0.1%, glucagon (human recombinant), glucose, glucose, heparin (porcine), naloxone, ondansetron, ondansetron, oxyCODONE, oxyCODONE, prochlorperazine, simethicone, sodium chloride 0.9%, sodium chloride 0.9%    Review of Systems   Constitutional: Positive for activity change, appetite change, fatigue and fever.   HENT: Negative.  Negative for facial swelling.    Eyes: Negative.    Respiratory: Negative.  Negative for apnea, chest tightness, shortness of breath and wheezing.    Cardiovascular: Negative.  Negative for chest pain, palpitations and leg swelling.   Gastrointestinal: Positive for abdominal distention, abdominal pain and constipation. Negative for diarrhea, nausea and vomiting.   Genitourinary: Positive for decreased urine volume. Negative for difficulty urinating, dysuria, hematuria and urgency.   Musculoskeletal: Negative.  Negative for back pain, gait problem, neck pain  and neck stiffness.   Skin: Negative.  Negative for color change and pallor.   Allergic/Immunologic: Positive for immunocompromised state.   Neurological: Negative.  Negative for dizziness, seizures, weakness, light-headedness and headaches.   Psychiatric/Behavioral: Positive for dysphoric mood. Negative for behavioral problems, confusion, hallucinations, sleep disturbance and suicidal ideas. The patient is not nervous/anxious.         Tearful     Objective:     Vital Signs (Most Recent):  Temp: 99 °F (37.2 °C) (03/17/18 0848)  Pulse: 75 (03/17/18 0846)  Resp: 16 (03/17/18 0840)  BP: 129/84 (03/17/18 0846)  SpO2: 95 % (03/17/18 0846) Vital Signs (24h Range):  Temp:  [98.5 °F (36.9 °C)-99.7 °F (37.6 °C)] 99 °F (37.2 °C)  Pulse:  [65-93] 75  Resp:  [16-18] 16  SpO2:  [94 %-98 %] 95 %  BP: (129-151)/(76-93) 129/84     Weight: 74.8 kg (164 lb 14.5 oz)  Body mass index is 26.62 kg/m².    Intake/Output - Last 3 Shifts       03/15 0700 - 03/16 0659 03/16 0700 - 03/17 0659 03/17 0700 - 03/18 0659    P.O. 620      Other  600     IV Piggyback 450 200     TPN 1354.5      Total Intake(mL/kg) 2424.5 (32.4) 800 (10.7)     Urine (mL/kg/hr) 150 (0.1) 585 (0.3) 275 (1)    Emesis/NG output 0 (0)  0 (0)    Other 0 (0) 4108 (2.3) 0 (0)    Stool 0 (0) 0 (0) 0 (0)    Blood 0 (0)  0 (0)    Total Output 150 4693 275    Net +2274.5 -3893 -275           Urine Occurrence 1 x  0 x    Stool Occurrence 0 x 0 x 0 x    Emesis Occurrence 0 x  0 x          Physical Exam   Constitutional: He is oriented to person, place, and time. He appears well-developed.   Hand and temporal muscle wasting   HENT:   Head: Normocephalic.   Eyes: Pupils are equal, round, and reactive to light. No scleral icterus.   Cardiovascular: Normal rate, regular rhythm, normal heart sounds and intact distal pulses.    Pulmonary/Chest: Effort normal. No respiratory distress. He has decreased breath sounds in the right lower field and the left lower field. He has no wheezes. He  has no rhonchi. He has no rales.   L base, decreased BS R base   Abdominal: Soft. Bowel sounds are normal. He exhibits distension. There is no tenderness.   Pain with palpation   Musculoskeletal: Normal range of motion. He exhibits edema (generalized).   Neurological: He is alert and oriented to person, place, and time.   Skin: Skin is warm and dry.   Psychiatric: He has a normal mood and affect. His behavior is normal. Judgment and thought content normal.   Nursing note and vitals reviewed.      Laboratory:  Immunosuppressants         Stop Route Frequency     cycloSPORINE modified (NEORAL) capsule 100 mg      -- Oral 2 times daily     cycloSPORINE modified (NEORAL) 25 MG capsule      03/09 1714       cycloSPORINE modified (NEORAL) 100 MG capsule      03/09 1714       cycloSPORINE modified (NEORAL) 100 MG capsule      03/09 0514       cycloSPORINE modified (NEORAL) 25 MG capsule      03/09 0514          CBC:   Recent Labs  Lab 03/17/18  0530   WBC 6.61   RBC 2.62*   HGB 8.4*   HCT 25.8*   *   MCV 99*   MCH 32.1*   MCHC 32.6     CMP:   Recent Labs  Lab 03/17/18  0530   GLU 94   CALCIUM 8.5*   ALBUMIN 2.0*   PROT 5.9*      K 3.7   CO2 24      BUN 40*   CREATININE 2.6*   ALKPHOS 103   ALT 6*   AST 26   BILITOT 1.4*     Coagulation:   Recent Labs  Lab 03/12/18  1004   INR 1.1     Labs within the past 24 hours have been reviewed.    Diagnostic Results:  None    Assessment/Plan:     Biliary stricture of transplanted liver    - ERCP 12/6 with post-anastomosis stricture with stent placement.  - Tbili with increase prompting ERCP 1/30 with stones and sludge, stents placed.  - Repeat ERCP 2/19 as was having recurrent fevers with bilirubin stuck at 2.0-2.2 with overall unremarkable findings seen. Did place new larger stents.   - US 3/2 stent in CBD  - ERCP/EUS 3/9 with mental stent placed in bile duct, stones removed  - Tbili remains stable         Liver transplanted    - Post op course complicated by fevers  and hyperbilirubinemia, AST/ALT stable.  - see biliary stricture of transplanted liver  - Liver US 1/8 showed 3.9 cm complex fluid collection anterior to right lobe and moderate nonspecific complex ascites inferior to transplant.   - IR placed drain 1/11/18, cell count negative for infection.  - Liver biopsy 1/23 without rejection.  - Paracentesis 3/6 negative for infection, wbc 28 segs 3%.  - ERCP, EUS biopsy 3/9/18 - no rejection, mild cholestatic hepatitis seen.         Fever    - Started on vanc/cefepime for ERCP then transitioned to vanc/zosyn.  - Blood cultures sent, prelim NGTD. Repeat blood cultures and chest xray today 2/10.  - thoracentesis 2/1 - fluid negative for infection.  - now cmv positive - started treatment 2/5/18. CMV PCR 2/15 negative  - taken to OR 2/7 for exp lap - fluid collections drained, cultures NGTD.  - CT A/P 2/15 with no evidence of IA fluid infection. Did show R pleural effusion s/p thoracentesis, cell count negative for infection, cx pending.  - Repeat blood cx 2/15 prelim NGTD  - Bilirubin remains elevated. Concerning for cholangitis. Antibiotics broadened 2/17. ID re consulted.   - ERCP  2/19, overall unremarkable findings. Did place new larger stents. Will need repeat ERCP in 10 weeks. Bilirubin is stable post ERCP.  - Tmax 101 2/21 PM again ID following. Plan to transition vanc/zosyn to ertapenem 2/22 as no improvement noted.   - Erta started 2/22.  - CT A/P obtained 2/26 with focal/loculated region of fluid in the lower right abdomen measuring approximately 8.7 x 5.5 cm.   - IR attempted drainage on fluid collection on 2/27, however, per IR,there was limited fluid interposed with bowel in the RLQ with no safe pocket seen for drainage.  - Tagged WBC scan 2/27, negative results.  - Remained with intermittent fevers without clear infection source.   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 EBV PCR negative. Adenovirus negative. Repeat CMV PCR negative.    - Back pain and  abdominal pain persists. Last BM 3/13 with brown bomb.   - ID following. Cont Ertapenem dc'd 3/7 - due to AMS --> Tmax 101.1 overnight 3/9 - Cefepime/vanc/flagyl.  Monitor.   - Remains afebrile x several days  - Bx with cholestatic hepatitis --> couldn't cx secondary to EUS contamination --> will cx next bx  - Plan to continue abx x 14 days to treat cholangitis --> stop date 3/23.  - Bandemia noted on labs 3/14 was 6 then 3/15 and 3/16 was 7.0, continue to monitor.        Nausea and vomiting    - h/o constipation, reports having regular BM's with bowel regimen.  - vomiting episode x 1 at home. Anti-emetics ordered PRN.  - KUB repeated 1/14/18 given increased abdominal pain- mild, generalized bowel distention suggesting ileus.  Diet changed to clears as tolerated for bowel rest .  - SHAEN tube intermittent re-placed several times this admission but it was poorly tolerated due to persistent N/V--last SHANE tube d/c'd 2/23  - chronic constipation, Cont bowel regimen.   - 2/24 changes: Increased Reglan to 10 mg, D/C atovaquone, transitioned cyclo to Prograf, change twyla to BID, check Keppra level  - GI consulted. EGD 3/6 as with continued nausea/vomiting with po intake. Appreciate GI recs.    - Cont to have nausea and vomiting w medications and eating.  Cont TPN.     - holding reglan 3/8 due to neurologic symptoms.   - If cont with GI symptoms then may need to consider Flex sig/ C-scope to further eval.         Seizure    - 3/1, patient with stuttering speech, trouble recalling specific words when speaking. Does have hx of seizure (on keppra) previously while on prograf. Switched to prograf over the weekend from cyclosporine. Neuro exam unremarkable.   - CT head without contrast obtained, unremarkable.   - In light of these symptoms, prograf d/c, restart cyclosporine 3/1.  - remains w/o seizure. Cont to have stutter/garbled speech/difficulty finding words.  Worse post EGD as sedation was given.  - MRI 3/6 unremarkable.  -  EEG completed 3/8 - mild, generalized, non-specific cerebral dysfunction, no epileptiform activity  - monitor closely. VSS.        Severe protein-calorie malnutrition    - Poor PO intake since transplant requiring short course of TPN during previous hospital stay.   - Dietary consulted. Will need to closely monitor PO intake.   - supplements also ordered, refusing to drink  - SHANE with tube feeding start 1/16. Stopped 1/17/18, was not tolerating. Shane removed 1/20.  - prealbumin 7 on 1/15 --> prealbumin 10 on 3/12, monitoring weekly  - TPN started 1/16/18.   - GI consulted - EGD reviewed, increased ppi bid, miralax bid, and metamucil bid, decreased narcotics.  - Poorly tolerated TF, had worsening nausea. Not tolerating well due to nausea.   - small bowel follow through with normal findings.   - consulted gi for possible gj tube placement, holding off on this for now as with ongoing fevers.  - retrial marinol 2/22 as pt reports that's the only thing helping his appetite, in the evening 2/2 concern for possible drowsiness --> marinol d/c due to neuro symptoms and sedation  - Pt then developed persistent N/V again and TPN started 2/24. Cont TPN for now.   - EGD 3/6 with normal esophagus    - congested, erythematous and nodular mucosa in the stomach.    - biliary stent in the duodenum    - one duodenal ulcer with a clean ulcer base (Emir Class III).    - biopsies taken in the duodenal bulb and second and third portion of the duodenum.  - Hold Reglan for neuro symptoms.  - Continue TPN, tolerating diet, but has minimal appetite, eating 25% 2/3 meals  - Calorie count initiated x 48 hours to reassess TPN and assess need for TF        Prophylactic immunotherapy    - See long term use of immunosuppression.         Long-term use of immunosuppressant medication    - Maintenance IS with cyclosporine --> transitioned to Prograf 2/24 for continued nausea but did not tolerate from neuro standpoint.  PT IS NOT A CANDIDATE FOR  TACROLIMUS.    - Transitioned back to cyclosporine 3/1  - D/c cyclosporine, started rapa & stress dose steroids 3/10 for continued confusion  - Mental status now improved, retrial cyclosporine 3/13, d/c rapa 3/13  - Transition back to prednisone taper from stress dose steroids 3/15        At risk for opportunistic infections    - see other cytomegaloviral disease  - Pentam given 2/24.  - continue isavu for complicated post-op course        Vitamin D deficiency    - Continue ergo 50K weekly.         Kidney transplant candidate              Other cytomegaloviral diseases    - detected at 370 on CMV PCR 2/1.  - case discussed with ID and will hold off on treatment at this time given low WBC.   - undetected CMV PCR 2/8, 2/15, 2/22, 3/8   - continue Valcyte 200 mg every Mon, Wed, Fri.  - Per ID, valcyte dose decreased to ppx dose on 2/18--Valcyte d/c'd 2/25  - Repeat scope 3/6 to assess persistent N/V and possible CMV causing these symptoms. Biopsies thus far normal, cmv stains negative.  - continue to monitor weekly CMV PCR's, last PCR neg from 3/15.        Constipation    - chronic  - encourage ambulation, stool softeners and laxatives, often refuses some meds 2/2 nausea, will often require suppository or brown bomb enema to have a BM  - Small bowel follow through with normal findings        Recurrent pleural effusion on right    - CXR in ER with large right pleural effusion with subjective c/o worsening SOB.  - Multiple Thora's this admission, all non-infectious: 1/11, 1/19, 1/31, 2/16  - CT A/P 2/26 with large R pleural effusion, may need repeat thora if fevers reoccur.    - complaining of sob, obtain chest xray, trial dose of diamox 3/14 with minimal UOP  - chest xray with large pleural effusion 3/13 --> may need thora soon if becomes symptomatic or develops fever  - Fluid removal with HD        Anemia of chronic disease    - 1 U PRBC 3/12  - Monitor daily labs. H/H stable, transfuse PRN.         Delayed surgical  wound healing    - Wd vac removed 1/12/18.    - Wound healing well.   - Changing dressing q Tuesday, last 3/13          Acute renal failure with tubular necrosis    - HD resumed on previous admission. Now anuric and dialyzes M-W-F.  - Nephrology following.    - Goal is for kidney txp in future- currently not a candidate due to health status.            VTE Risk Mitigation         Ordered     heparin (porcine) injection 1,000 Units  As needed (PRN)     Route:  Intra-Catheter        03/12/18 1134     heparin (porcine) injection 5,000 Units  Every 8 hours     Route:  Subcutaneous        03/08/18 1622     Medium Risk of VTE  Once      01/11/18 0351     Place sequential compression device  Until discontinued      01/11/18 0351          The patients clinical status was discussed at multidisplinary rounds, involving transplant surgery, transplant medicine, pharmacy, nursing, nutrition, and social work    Discharge Planning:  Monitor kidney function --> may need outpt HD  Monitor HH needs vs rehab for deconditioned status      Jin Perez NP  Liver Transplant  [facility]

## 2018-03-18 LAB
ALBUMIN SERPL BCP-MCNC: 2 G/DL
ALP SERPL-CCNC: 105 U/L
ALT SERPL W/O P-5'-P-CCNC: 5 U/L
ANION GAP SERPL CALC-SCNC: 11 MMOL/L
ANISOCYTOSIS BLD QL SMEAR: SLIGHT
AST SERPL-CCNC: 28 U/L
BASOPHILS # BLD AUTO: ABNORMAL K/UL
BASOPHILS NFR BLD: 0 %
BILIRUB SERPL-MCNC: 1.4 MG/DL
BUN SERPL-MCNC: 58 MG/DL
CALCIUM SERPL-MCNC: 8.6 MG/DL
CHLORIDE SERPL-SCNC: 105 MMOL/L
CO2 SERPL-SCNC: 24 MMOL/L
CREAT SERPL-MCNC: 3.3 MG/DL
CYCLOSPORINE BLD LC/MS/MS-MCNC: 174 NG/ML
DIFFERENTIAL METHOD: ABNORMAL
EOSINOPHIL # BLD AUTO: ABNORMAL K/UL
EOSINOPHIL NFR BLD: 1 %
ERYTHROCYTE [DISTWIDTH] IN BLOOD BY AUTOMATED COUNT: 19.7 %
EST. GFR  (AFRICAN AMERICAN): 27.8 ML/MIN/1.73 M^2
EST. GFR  (NON AFRICAN AMERICAN): 24.1 ML/MIN/1.73 M^2
GLUCOSE SERPL-MCNC: 88 MG/DL
HCT VFR BLD AUTO: 26.2 %
HGB BLD-MCNC: 8.6 G/DL
IMM GRANULOCYTES # BLD AUTO: ABNORMAL K/UL
IMM GRANULOCYTES NFR BLD AUTO: ABNORMAL %
LYMPHOCYTES # BLD AUTO: ABNORMAL K/UL
LYMPHOCYTES NFR BLD: 23 %
MAGNESIUM SERPL-MCNC: 1.7 MG/DL
MCH RBC QN AUTO: 32.3 PG
MCHC RBC AUTO-ENTMCNC: 32.8 G/DL
MCV RBC AUTO: 99 FL
METAMYELOCYTES NFR BLD MANUAL: 1 %
MONOCYTES # BLD AUTO: ABNORMAL K/UL
MONOCYTES NFR BLD: 7 %
NEUTROPHILS NFR BLD: 64 %
NEUTS BAND NFR BLD MANUAL: 4 %
NRBC BLD-RTO: 0 /100 WBC
PHOSPHATE SERPL-MCNC: 4.1 MG/DL
PLATELET # BLD AUTO: 123 K/UL
PLATELET BLD QL SMEAR: ABNORMAL
PMV BLD AUTO: 11.9 FL
POLYCHROMASIA BLD QL SMEAR: ABNORMAL
POTASSIUM SERPL-SCNC: 3.6 MMOL/L
PROT SERPL-MCNC: 5.9 G/DL
RBC # BLD AUTO: 2.66 M/UL
SODIUM SERPL-SCNC: 140 MMOL/L
WBC # BLD AUTO: 6.98 K/UL

## 2018-03-18 PROCEDURE — 94664 DEMO&/EVAL PT USE INHALER: CPT

## 2018-03-18 PROCEDURE — 94761 N-INVAS EAR/PLS OXIMETRY MLT: CPT

## 2018-03-18 PROCEDURE — 80158 DRUG ASSAY CYCLOSPORINE: CPT

## 2018-03-18 PROCEDURE — 80053 COMPREHEN METABOLIC PANEL: CPT

## 2018-03-18 PROCEDURE — S0030 INJECTION, METRONIDAZOLE: HCPCS | Performed by: NURSE PRACTITIONER

## 2018-03-18 PROCEDURE — 85007 BL SMEAR W/DIFF WBC COUNT: CPT

## 2018-03-18 PROCEDURE — A4217 STERILE WATER/SALINE, 500 ML: HCPCS | Performed by: TRANSPLANT SURGERY

## 2018-03-18 PROCEDURE — 99900035 HC TECH TIME PER 15 MIN (STAT)

## 2018-03-18 PROCEDURE — 25000003 PHARM REV CODE 250: Performed by: TRANSPLANT SURGERY

## 2018-03-18 PROCEDURE — 63600175 PHARM REV CODE 636 W HCPCS: Performed by: NURSE PRACTITIONER

## 2018-03-18 PROCEDURE — 85027 COMPLETE CBC AUTOMATED: CPT

## 2018-03-18 PROCEDURE — 99233 SBSQ HOSP IP/OBS HIGH 50: CPT | Mod: ,,, | Performed by: NURSE PRACTITIONER

## 2018-03-18 PROCEDURE — 83735 ASSAY OF MAGNESIUM: CPT

## 2018-03-18 PROCEDURE — 63600175 PHARM REV CODE 636 W HCPCS: Performed by: TRANSPLANT SURGERY

## 2018-03-18 PROCEDURE — 25000003 PHARM REV CODE 250: Performed by: NURSE PRACTITIONER

## 2018-03-18 PROCEDURE — 63600175 PHARM REV CODE 636 W HCPCS: Performed by: PHYSICIAN ASSISTANT

## 2018-03-18 PROCEDURE — 63600175 PHARM REV CODE 636 W HCPCS: Mod: JG | Performed by: NURSE PRACTITIONER

## 2018-03-18 PROCEDURE — 25000003 PHARM REV CODE 250: Performed by: PHYSICIAN ASSISTANT

## 2018-03-18 PROCEDURE — P9047 ALBUMIN (HUMAN), 25%, 50ML: HCPCS | Mod: JG | Performed by: NURSE PRACTITIONER

## 2018-03-18 PROCEDURE — B4185 PARENTERAL SOL 10 GM LIPIDS: HCPCS | Performed by: TRANSPLANT SURGERY

## 2018-03-18 PROCEDURE — 20600001 HC STEP DOWN PRIVATE ROOM

## 2018-03-18 PROCEDURE — 84100 ASSAY OF PHOSPHORUS: CPT

## 2018-03-18 RX ORDER — ALBUMIN HUMAN 250 G/1000ML
25 SOLUTION INTRAVENOUS EVERY 4 HOURS
Status: COMPLETED | OUTPATIENT
Start: 2018-03-18 | End: 2018-03-18

## 2018-03-18 RX ADMIN — ALBUMIN HUMAN 25 G: 0.25 SOLUTION INTRAVENOUS at 02:03

## 2018-03-18 RX ADMIN — I.V. FAT EMULSION 250 ML: 20 EMULSION INTRAVENOUS at 09:03

## 2018-03-18 RX ADMIN — ALBUMIN HUMAN 25 G: 0.25 SOLUTION INTRAVENOUS at 10:03

## 2018-03-18 RX ADMIN — CEFEPIME 1 G: 1 INJECTION, POWDER, FOR SOLUTION INTRAMUSCULAR; INTRAVENOUS at 05:03

## 2018-03-18 RX ADMIN — ONDANSETRON HYDROCHLORIDE 4 MG: 2 INJECTION, SOLUTION INTRAMUSCULAR; INTRAVENOUS at 09:03

## 2018-03-18 RX ADMIN — METRONIDAZOLE 500 MG: 500 INJECTION, SOLUTION INTRAVENOUS at 05:03

## 2018-03-18 RX ADMIN — DOCUSATE SODIUM 100 MG: 100 CAPSULE, LIQUID FILLED ORAL at 09:03

## 2018-03-18 RX ADMIN — LEVOTHYROXINE SODIUM 75 MCG: 75 TABLET ORAL at 05:03

## 2018-03-18 RX ADMIN — BISACODYL 10 MG: 5 TABLET, COATED ORAL at 07:03

## 2018-03-18 RX ADMIN — METRONIDAZOLE 500 MG: 500 INJECTION, SOLUTION INTRAVENOUS at 01:03

## 2018-03-18 RX ADMIN — DOCUSATE SODIUM 100 MG: 100 CAPSULE, LIQUID FILLED ORAL at 07:03

## 2018-03-18 RX ADMIN — HEPARIN SODIUM 5000 UNITS: 5000 INJECTION, SOLUTION INTRAVENOUS; SUBCUTANEOUS at 01:03

## 2018-03-18 RX ADMIN — CYCLOSPORINE 100 MG: 100 CAPSULE, LIQUID FILLED ORAL at 07:03

## 2018-03-18 RX ADMIN — URSODIOL 300 MG: 300 CAPSULE ORAL at 09:03

## 2018-03-18 RX ADMIN — SODIUM CHLORIDE: 234 INJECTION INTRAMUSCULAR; INTRAVENOUS; SUBCUTANEOUS at 09:03

## 2018-03-18 RX ADMIN — LEVETIRACETAM 500 MG: 100 SOLUTION ORAL at 07:03

## 2018-03-18 RX ADMIN — LEVETIRACETAM 500 MG: 100 SOLUTION ORAL at 09:03

## 2018-03-18 RX ADMIN — OMEPRAZOLE 40 MG: 40 CAPSULE, DELAYED RELEASE ORAL at 07:03

## 2018-03-18 RX ADMIN — PREDNISONE 20 MG: 10 TABLET ORAL at 07:03

## 2018-03-18 RX ADMIN — URSODIOL 300 MG: 300 CAPSULE ORAL at 08:03

## 2018-03-18 RX ADMIN — OXYCODONE HYDROCHLORIDE 5 MG: 5 TABLET ORAL at 09:03

## 2018-03-18 RX ADMIN — ALBUMIN HUMAN 25 G: 0.25 SOLUTION INTRAVENOUS at 05:03

## 2018-03-18 RX ADMIN — CYCLOSPORINE 100 MG: 100 CAPSULE, LIQUID FILLED ORAL at 05:03

## 2018-03-18 RX ADMIN — OXYCODONE HYDROCHLORIDE 5 MG: 5 TABLET ORAL at 05:03

## 2018-03-18 RX ADMIN — METRONIDAZOLE 500 MG: 500 INJECTION, SOLUTION INTRAVENOUS at 09:03

## 2018-03-18 NOTE — PLAN OF CARE
Problem: Patient Care Overview  Goal: Plan of Care Review  Outcome: Ongoing (interventions implemented as appropriate)  Plan of care reviewed with patient. Pt verbalized understanding. Pt AAOX4. Pt responding to questions and conversation appropriately.  Pt free from falls, injuries and trauma.  Pt free from skin breakdown.  Pt VSS and in NAD.  Pt afebrile.  Pt had no complaints of SOB, N/V or CP.  Pt had no complaints of pain this shift. Pt had 1 BM this shift. Pt ambulated with standby assistance in hallway. TPN continuous @ 48 ml/hr and lipids at 20.8ml/hr.   Pt had uneventful evening. Pt in low locked bed with personal items and call light within reach. Fall precautions maintained. Mother remained at bedside.

## 2018-03-18 NOTE — PROGRESS NOTES
[facility]  Liver Transplant  Progress Note    Patient Name: Jhonny Diana  MRN: [id number]  Admission Date: 2018  Hospital Length of Stay: 66 days  Code Status: Full Code  Primary Care Provider: Primary Doctor No  Post-Operative Day: 150    ORGAN:   LIVER  Disease Etiology: Acute Alcoholic Hepatitis  Donor Type:    - Brain Death  CDC High Risk:   No  Donor CMV Status:   Donor CMV Status: Positive  Donor HBcAB:   Negative  Donor HCV Status:   Negative  Whole or Partial: Whole Liver  Biliary Anastomosis: End to End  Arterial Anatomy: Standard  Subjective:     History of Present Illness:  Jhonny Diana is a 26 y/o male with past medical history of alcoholic cirrhosis.  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 Found to have peritonitis in November (WBC 5000, 75% PNM) neg for bile leak. He was treated initially with vanc/cefepime. Repeat cell counts  with some improvement (WBC 1400, 45% PNM). He has undergone multiple paracenteses as well as abscess drainage of perihepatic fluid collections and treated with antimicrobial therapy but no positive cultures. Of note, biliary stricture also identified and ERCP performed on 2017 with sphincterotomy and biliary stent placed. Liver tests still have not normalized despite intervention, bilirubin and AP remain elevated. Other pertinent PMH current wound vac in place 2/2 wound infection, malnutrition requiring TPN for short course and ongoing hypoalbuminemia, and seizure activity while on prograf and has since been switched to cyclosporine without reoccurrence.  He presented to the ER for fever, abdominal pain, and N/V. He reports fever (103) for 1  day prior. Overnight, he developed N/V, reports small amount of green emesis with new left sided pain. He also endorses worsening SOB with exertion. He was scheduled as an outpatient for follow up paracentesis and IR drainage of fluid collection. CXR in ER shows large pleural effusion with subsegmental atelectasis. Infectious work up initiated in ER. His ANC is 900. Broad spectrum antibiotics initiated in ED. At admit, he denied chest pain, palpitations, diarrhea, constipation, back pain, or any sick contacts.    Hospital Course:  Thoracentesis (1.2L off), Paracentesis (1.6L off), and IR drainage of fluid collection 1/11, all fluids negative for infection. ID consulted. Broad spectrum antibiotics d/c'd 1/15. Chronically malnourished with poor PO intake, prealbumin 7. Shane tube placed 1/15 for tube feedings.     ERCP 1/17 with sludge and a biliary stone which was removed and stent exchanged.  Remained with n/v; therefore, TF remained on hold and TPN continued.   Pt with fever s/p ERCP on 1/17 which continued until 1/19.  Vanc/cefepime restarted.  Blood cx 1/17 and 1/18 NGTD.  ID reconsulted.  Fungal markers sent.  CT C/A/P obtained.  With large R pleural effusion and ascites- both drained 1/19 and negative for infx per cell count. Pt afebrile 1/20. Liver biopsy 1/23 - without rejection.  EGD 1/25 - unremarkable for source of GI symptoms.  Resume diet along with other GI recommendations. SHANE tube placed for tube feeds.       2/22: Pt again spiked temp of 101 overnight, now down to 99. Resent Bcx overnight, prelim NGTD. Transitioned to Erta from vanc/zosyn per ID. Planning for tagged WBC scan on Monday. R sided abdominal erythema/dependent edema improving today.  TF have been on hold since 2/20. Will continue to hold today per pt request. Pt working on diet. KUB yesterday with significant stool and gas. Declines colace, miralax, metamucil, but willing to take daily bisacodyl. Pt agreeable to suppository today. If  suppository not successful can give mag citrate through dylan tonight. Will also retrial Marinol tonight as pt reported improvement in appetite previously. Will monitor closely for sedation.     2/23:  2 small BM 2/22.  Tolerated HD.  [place] removed.  Will attempt to eat this PM.      2/24:  Pt ate then vomited hamburger 2/23.  Will resume TPN.  D/C atovaquone, transition cyclo to Prograf, check Keppra level, increase Reglan to 0 mg, change twyla to BID for nausea.  Will give pentam today.    2/25:  TPN going well, pt ate 1/2 subway sandwhich without vomiting yesterday, will continue to monitor.  Plan to repeat EGD this week.  Pt with drop in H/h again and appears to be hemolyzing.  LDH elevated, hapto slightly low, LEE pending.  With neutropenia- Neupogen given.    2/26:  Pt with severe back pain s/p HD. Unclear if secondary to neupogen.  Repeat labs stable. Indium scan scheduled for 2/27- injection done today (back pain started prior). CT A/P obtained with fluid collection seen in pelvis.    2/27: Patient spiking fevers, 101. Indium scan, negative results. CT A/P obtained 2/26 with focal/loculated region of fluid in the lower right abdomen measuring approximately 8.7 x 5.5 cm. IR attempted drainage, however, per IR,there was  limited fluid interposed with bowel in the RLQ with no safe pocket seen for drainage.    2/28: Remains with low grade fevers. Neupogen given.     3/1: Patient with stuttering speech, trouble recalling specific words when speaking. Does have hx of seizure (on keppra) previously while on prograf. Switched to prograf 2/24 from cyclosporine. Neuro exam unremarkable. CT head without contrast obtained, unremarkable. In light of these symptoms, prograf d/c, cyclosporine restarted 3/1. Per transplant attending, held off on EGD at this time as concern anesthesia would put patient at further increase risk for seizure activity.     3/4: pt walked in room yesterday.  Had 1 BM.  No appetite still.  Cont TPN.   He reports swelling and back pain today.  He vomited after taking Oxy PO.  Fentanyl 12.5mg IV ordered.  Patient rested well afterward.  Remains afebrile.   ID following.   Cont Ertapenem.  Repeat blood and urine cx given continued malaise and stutter- blood cx prelim NGTD.  UA ok.  Urine cx pending.    Tentative plan for EGD on Monday to assess source of fevers/nausea/abdominal pain w bx for CMV and GVHD.  Will also reassess rather able to perform paracentesis.     3/5: no acute events overnight. Pt still not feeling well and with poor sleep overnight. Cont to have mumbling of speech at times but slightly better than before. Remains with abdominal pain but relieved with PO pain meds. Tolerated HD well today. Will plan for paracentesis and EGD tomorrow to assess for peritonitis and cause of persistent N/V respectively.     3/6: pt continues to have garbled/mumbled speech. EGD performed today and pt received sedation during procedure. Increased lethargy and dysarthria noted post procedure. VS stable. Paracentesis scheduled today as well to r/o peritonitis. Wbc count stable. Afebrile. Continue Ertapenem. HD yesterday and pt tolerated well, 2.5 L removed. Monitor.     3/7: MRI brain yesterday unremarkable. Blood cultures ngtd so far. Neurology consulted. EGD and Paracentesis yesterday. Para with 25 cc removed, wbc 28 segs 3%. Continue TPN. Monitor.    3/8: EUS and ERCP scheduled tomorrow. NPO after midnight. T bili continues trending up daily. Last ERCP on 2/19 with two stents placed. Ertapenem dc'd 3/7. Pt spiking temps today, tmax 100.9. Cefepime/vanc started. Oxycodone decreased to 5 mg q6h prn. Dc marinol and reglan for now. He continues to slur/stutter and have jerky movements throughout the day. Neurology following. EEG scheduled today. Monitor.     3/9: EUS/ERCP completed with choledoliathiasis.  Fever over night.  May need LP in future if mental status does not improve.      3/10- speech improved, neuro  suggesting lumbar puncture - schedule for Monday    3/11 - more lethargic, tmax 100.9, plan for lumbar puncture in am.    3/12 - mental status greatly improved, walking halls with PT, will hold off on LP for now.    3/13 - mental status remains stable, complains of pain to lower abdomen, bowel movement with suppository over night, KUB with non specific bowel gas pattern, would like to increase pain regimen - spoke with patient and mother this was counter intuitive as narcotics slow down the bowels, patient states understanding - will take colace religiously with increased pain medicine.  Also complaining of sob - chest xray and dose of diamox    3/14: Mentation good.  Continue cyclosporine.   Tolerated HD.  Decrease hydrocortisone dose once pt has cyclosporine level.  Transjug bx shows mild cholestatic hepatitis --> unable to cx since bx performed via EUS.  Will plan to obtain culture with next bx.    3/15: Pt feeling well.  Still reports abd pain which is slightly improved.  He feels eating is going well- calorie count initiated --> goal to decreased then D/c TPN.  Restart of TF discussed with pt and mom but will allow for a 48 hour calorie count.  Hydrocortisone transitioned back to prednisone taper.  Pt with 7 bands --> will continue to monitor closely for infection.    3/16: More fatigued today, feels sleepy after HD and walking in halls with PT. Very tearful, patient denied wanting to talk with someone about his mood and emotions and declined anti-depressive medications at this time. Still only eating 25% of 2/3 meals daily with minimal calories. Continue TPN/Lipids for now. Not interested in drinking boost supplements or tube feeding. Bands continue to be elevated at 7, continue to monitor.    3/17-3/18: continues to c/o fatigue. Reports poor appetite 2/2 feeling full and N/V. Continue with TPN. Afebrile. UOP 990ml/24 hours, will give albumin x 3 doses today.     Scheduled Meds:   albumin human 25%  25 g  Intravenous Q4H    bisacodyl  10 mg Oral Daily    ceFEPime (MAXIPIME) IVPB  1 g Intravenous Q12H    cycloSPORINE modified  100 mg Oral BID    docusate sodium  100 mg Oral BID    epoetin maurisio (PROCRIT) injection  10,000 Units Intravenous Every Mon, Wed, Fri    ergocalciferol  50,000 Units Oral Q7 Days    fat emulsion 20%  250 mL Intravenous Once    heparin (porcine)  5,000 Units Subcutaneous Q8H    custom IVPB builder   Intravenous Q24H    levetiracetam oral soln  500 mg Oral BID    levothyroxine  75 mcg Oral Before breakfast    metronidazole  500 mg Intravenous Q8H    multivitamin  1 tablet Oral Daily    omeprazole  40 mg Oral QAM    polyethylene glycol  17 g Oral BID    predniSONE  20 mg Oral Daily    psyllium husk (aspartame)  3.4 g Oral BID    ursodiol  300 mg Oral BID     Continuous Infusions:   TPN ADULT CENTRAL LINE CUSTOM 48 mL/hr at 03/17/18 2126    TPN ADULT CENTRAL LINE CUSTOM       PRN Meds:sodium chloride 0.9%, acetaminophen, albuterol-ipratropium 2.5mg-0.5mg/3mL, bisacodyl, dextrose 50%, dextrose 50%, diphenhydrAMINE-zinc acetate 1-0.1%, glucagon (human recombinant), glucose, glucose, heparin (porcine), naloxone, ondansetron, ondansetron, oxyCODONE, oxyCODONE, prochlorperazine, simethicone, sodium chloride 0.9%, sodium chloride 0.9%    Review of Systems   Constitutional: Positive for activity change, appetite change, fatigue and fever.   HENT: Negative.  Negative for facial swelling.    Eyes: Negative.    Respiratory: Negative.  Negative for apnea, chest tightness, shortness of breath and wheezing.    Cardiovascular: Negative.  Negative for chest pain, palpitations and leg swelling.   Gastrointestinal: Positive for abdominal distention, abdominal pain and constipation. Negative for diarrhea, nausea and vomiting.   Genitourinary: Positive for decreased urine volume. Negative for difficulty urinating, dysuria, hematuria and urgency.   Musculoskeletal: Negative.  Negative for back pain,  gait problem, neck pain and neck stiffness.   Skin: Negative.  Negative for color change and pallor.   Allergic/Immunologic: Positive for immunocompromised state.   Neurological: Negative.  Negative for dizziness, seizures, weakness, light-headedness and headaches.   Psychiatric/Behavioral: Positive for dysphoric mood. Negative for behavioral problems, confusion, hallucinations, sleep disturbance and suicidal ideas. The patient is not nervous/anxious.         Tearful     Objective:     Vital Signs (Most Recent):  Temp: 98.8 °F (37.1 °C) (03/18/18 0753)  Pulse: 88 (03/18/18 0753)  Resp: 18 (03/18/18 0728)  BP: (!) 152/84 (03/18/18 0753)  SpO2: 97 % (03/18/18 0728) Vital Signs (24h Range):  Temp:  [98.5 °F (36.9 °C)-99.3 °F (37.4 °C)] 98.8 °F (37.1 °C)  Pulse:  [78-92] 88  Resp:  [16-20] 18  SpO2:  [97 %-98 %] 97 %  BP: (136-158)/(78-92) 152/84     Weight: 74.8 kg (164 lb 14.5 oz)  Body mass index is 26.62 kg/m².    Intake/Output - Last 3 Shifts       03/16 0700 - 03/17 0659 03/17 0700 - 03/18 0659 03/18 0700 - 03/19 0659    P.O.  360 180    Other 600      IV Piggyback 400 300     TPN  1171.7     Total Intake(mL/kg) 1000 (13.4) 1831.7 (24.5) 180 (2.4)    Urine (mL/kg/hr) 585 (0.3) 990 (0.6) 165 (0.5)    Emesis/NG output  0 (0) 0 (0)    Other 4108 (2.3) 0 (0) 0 (0)    Stool 0 (0) 0 (0) 0 (0)    Blood  0 (0) 0 (0)    Total Output 4693 990 165    Net -3693 +841.7 +15           Urine Occurrence  0 x 0 x    Stool Occurrence 0 x 0 x 0 x    Emesis Occurrence  0 x 0 x          Physical Exam   Constitutional: He is oriented to person, place, and time. He appears well-developed.   Hand and temporal muscle wasting   HENT:   Head: Normocephalic.   Eyes: Pupils are equal, round, and reactive to light. No scleral icterus.   Cardiovascular: Normal rate, regular rhythm, normal heart sounds and intact distal pulses.    Pulmonary/Chest: Effort normal. No respiratory distress. He has decreased breath sounds in the right lower field and  the left lower field. He has no wheezes. He has no rhonchi. He has no rales.   Abdominal: Soft. Bowel sounds are normal. He exhibits distension. There is no tenderness.   Pain with palpation   Musculoskeletal: Normal range of motion. He exhibits edema (generalized).   Neurological: He is alert and oriented to person, place, and time.   Skin: Skin is warm and dry.   Psychiatric: He has a normal mood and affect. His behavior is normal. Judgment and thought content normal.   Nursing note and vitals reviewed.      Laboratory:  Immunosuppressants         Stop Route Frequency     cycloSPORINE modified (NEORAL) capsule 100 mg      -- Oral 2 times daily     cycloSPORINE modified (NEORAL) 25 MG capsule      03/09 1714       cycloSPORINE modified (NEORAL) 100 MG capsule      03/09 1714       cycloSPORINE modified (NEORAL) 100 MG capsule      03/09 0514       cycloSPORINE modified (NEORAL) 25 MG capsule      03/09 0514          CBC:   Recent Labs  Lab 03/18/18  0500   WBC 6.98   RBC 2.66*   HGB 8.6*   HCT 26.2*   *   MCV 99*   MCH 32.3*   MCHC 32.8     CMP:   Recent Labs  Lab 03/18/18  0500   GLU 88   CALCIUM 8.6*   ALBUMIN 2.0*   PROT 5.9*      K 3.6   CO2 24      BUN 58*   CREATININE 3.3*   ALKPHOS 105   ALT 5*   AST 28   BILITOT 1.4*     Coagulation:   Recent Labs  Lab 03/12/18  1004   INR 1.1     Labs within the past 24 hours have been reviewed.    Diagnostic Results:  I have personally reviewed all pertinent imaging studies.    Assessment/Plan:     Fever    - Started on vanc/cefepime for ERCP then transitioned to vanc/zosyn.  - Blood cultures sent, prelim NGTD. Repeat blood cultures and chest xray today 2/10.  - thoracentesis 2/1 - fluid negative for infection.  - now cmv positive - started treatment 2/5/18. CMV PCR 2/15 negative  - taken to OR 2/7 for exp lap - fluid collections drained, cultures NGTD.  - CT A/P 2/15 with no evidence of IA fluid infection. Did show R pleural effusion s/p thoracentesis,  cell count negative for infection, cx pending.  - Repeat blood cx 2/15 prelim NGTD  - Bilirubin remains elevated. Concerning for cholangitis. Antibiotics broadened 2/17. ID re consulted.   - ERCP  2/19, overall unremarkable findings. Did place new larger stents. Will need repeat ERCP in 10 weeks. Bilirubin is stable post ERCP.  - Tmax 101 2/21 PM again ID following. Plan to transition vanc/zosyn to ertapenem 2/22 as no improvement noted.   - Erta started 2/22.  - CT A/P obtained 2/26 with focal/loculated region of fluid in the lower right abdomen measuring approximately 8.7 x 5.5 cm.   - IR attempted drainage on fluid collection on 2/27, however, per IR,there was limited fluid interposed with bowel in the RLQ with no safe pocket seen for drainage.  - Tagged WBC scan 2/27, negative results.  - Remained with intermittent fevers without clear infection source.   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 EBV PCR negative. Adenovirus negative. Repeat CMV PCR negative.    - Back pain and abdominal pain persists. Last BM 3/13 with brown bomb.   - ID following. Cont Ertapenem dc'd 3/7 - due to AMS --> Tmax 101.1 overnight 3/9 - Cefepime/vanc/flagyl.  Monitor.   - Remains afebrile x several days  - Bx with cholestatic hepatitis --> couldn't cx secondary to EUS contamination --> will cx next bx  - Plan to continue abx x 14 days to treat cholangitis --> stop date 3/23.  - Bandemia noted on labs 3/14 was 6 then 3/15 and 3/16 was 7.0, now trending down. Continue to monitor.        Severe protein-calorie malnutrition    - Poor PO intake since transplant requiring short course of TPN during previous hospital stay.   - Dietary consulted. Will need to closely monitor PO intake.   - supplements also ordered, refusing to drink  - SHANE with tube feeding start 1/16. Stopped 1/17/18, was not tolerating. [place] removed 1/20.  - prealbumin 7 on 1/15 --> prealbumin 10 on 3/12, monitoring weekly  - TPN started 1/16/18.   - GI  consulted - EGD reviewed, increased ppi bid, miralax bid, and metamucil bid, decreased narcotics.  - Poorly tolerated TF, had worsening nausea. Not tolerating well due to nausea.   - small bowel follow through with normal findings.   - consulted gi for possible gj tube placement, holding off on this for now as with ongoing fevers.  - retrial marinol 2/22 as pt reports that's the only thing helping his appetite, in the evening 2/2 concern for possible drowsiness --> marinol d/c due to neuro symptoms and sedation  - Pt then developed persistent N/V again and TPN started 2/24. Cont TPN for now.   - EGD 3/6 with normal esophagus    - congested, erythematous and nodular mucosa in the stomach.    - biliary stent in the duodenum    - one duodenal ulcer with a clean ulcer base (Emir Class III).    - biopsies taken in the duodenal bulb and second and third portion of the duodenum.  - Hold Reglan for neuro symptoms.  - Continue TPN, tolerating diet, but has minimal appetite, eating 25% 2/3 meals, no appetite 2/2 feeling full/N/V.  - Calorie count continued, patient refusing dylan tube        Biliary stricture of transplanted liver    - ERCP 12/6 with post-anastomosis stricture with stent placement.  - Tbili with increase prompting ERCP 1/30 with stones and sludge, stents placed.  - Repeat ERCP 2/19 as was having recurrent fevers with bilirubin stuck at 2.0-2.2 with overall unremarkable findings seen. Did place new larger stents.   - US 3/2 stent in CBD  - ERCP/EUS 3/9 with mental stent placed in bile duct, stones removed  - Tbili remains stable         Recurrent pleural effusion on right    - CXR in ER with large right pleural effusion with subjective c/o worsening SOB.  - Multiple Thora's this admission, all non-infectious: 1/11, 1/19, 1/31, 2/16  - CT A/P 2/26 with large R pleural effusion, may need repeat thora if fevers reoccur.    - complaining of sob, obtain chest xray, trial dose of diamox 3/14 with minimal UOP  -  chest xray with large pleural effusion 3/13 --> may need thora soon if becomes symptomatic or develops fever  - Fluid removal with HD        Liver transplanted    - Post op course complicated by fevers and hyperbilirubinemia, AST/ALT stable.  - see biliary stricture of transplanted liver  - Liver US 1/8 showed 3.9 cm complex fluid collection anterior to right lobe and moderate nonspecific complex ascites inferior to transplant.   - IR placed drain 1/11/18, cell count negative for infection.  - Liver biopsy 1/23 without rejection.  - Paracentesis 3/6 negative for infection, wbc 28 segs 3%.  - ERCP, EUS biopsy 3/9/18 - no rejection, mild cholestatic hepatitis seen.         Vitamin D deficiency    - Continue ergo 50K weekly.         Kidney transplant candidate              Other cytomegaloviral diseases    - detected at 370 on CMV PCR 2/1.  - case discussed with ID and will hold off on treatment at this time given low WBC.   - undetected CMV PCR 2/8, 2/15, 2/22, 3/8   - continue Valcyte 200 mg every Mon, Wed, Fri.  - Per ID, valcyte dose decreased to ppx dose on 2/18--Valcyte d/c'd 2/25  - Repeat scope 3/6 to assess persistent N/V and possible CMV causing these symptoms. Biopsies thus far normal, cmv stains negative.  - continue to monitor weekly CMV PCR's, last PCR neg from 3/15.        Constipation    - chronic  - encourage ambulation, stool softeners and laxatives, often refuses some meds 2/2 nausea, will often require suppository or brown bomb enema to have a BM  - Small bowel follow through with normal findings        Nausea and vomiting    - h/o constipation, reports having regular BM's with bowel regimen.  - vomiting episode x 1 at home. Anti-emetics ordered PRN.  - KUB repeated 1/14/18 given increased abdominal pain- mild, generalized bowel distention suggesting ileus.  Diet changed to clears as tolerated for bowel rest .  - SHANE tube intermittent re-placed several times this admission but it was poorly  tolerated due to persistent N/V--last SHANE tube d/c'd 2/23  - chronic constipation, Cont bowel regimen.   - 2/24 changes: Increased Reglan to 10 mg, D/C atovaquone, transitioned cyclo to Prograf, change twyla to BID, check Keppra level  - GI consulted. EGD 3/6 as with continued nausea/vomiting with po intake. Appreciate GI recs.    - Cont to have nausea and vomiting w medications and eating.  Cont TPN.     - holding reglan 3/8 due to neurologic symptoms.   - If cont with GI symptoms then may need to consider Flex sig/ C-scope to further eval.         Anemia of chronic disease    - 1 U PRBC 3/12  - Monitor daily labs. H/H stable, transfuse PRN.         Delayed surgical wound healing    - Wd vac removed 1/12/18.    - Wound healing well.   - Changing dressing q Tuesday, last 3/13          Seizure    - 3/1, patient with stuttering speech, trouble recalling specific words when speaking. Does have hx of seizure (on keppra) previously while on prograf. Switched to prograf over the weekend from cyclosporine. Neuro exam unremarkable.   - CT head without contrast obtained, unremarkable.   - In light of these symptoms, prograf d/c, restart cyclosporine 3/1.  - remains w/o seizure. Cont to have stutter/garbled speech/difficulty finding words.  Worse post EGD as sedation was given.  - MRI 3/6 unremarkable.  - EEG completed 3/8 - mild, generalized, non-specific cerebral dysfunction, no epileptiform activity  - monitor closely. VSS.        At risk for opportunistic infections    - see other cytomegaloviral disease  - Pentam given 2/24.  - continue isavu for complicated post-op course        Long-term use of immunosuppressant medication    - Maintenance IS with cyclosporine --> transitioned to Prograf 2/24 for continued nausea but did not tolerate from neuro standpoint.  PT IS NOT A CANDIDATE FOR TACROLIMUS.    - Transitioned back to cyclosporine 3/1  - D/c cyclosporine, started rapa & stress dose steroids 3/10 for continued  confusion  - Mental status now improved, retrial cyclosporine 3/13, d/c rapa 3/13  - Transition back to prednisone taper from stress dose steroids 3/15        Prophylactic immunotherapy    - See long term use of immunosuppression.         Acute renal failure with tubular necrosis    - HD resumed on previous admission. Now anuric and dialyzes M-W-F.  - Nephrology following.    - Goal is for kidney txp in future- currently not a candidate due to health status.  - urine output has greatly improved over last several days, 990ml/24 hours.   - Will give concentrated albumin x 3 doses today.            VTE Risk Mitigation         Ordered     heparin (porcine) injection 1,000 Units  As needed (PRN)     Route:  Intra-Catheter        03/12/18 1134     heparin (porcine) injection 5,000 Units  Every 8 hours     Route:  Subcutaneous        03/08/18 1622     Medium Risk of VTE  Once      01/11/18 0351     Place sequential compression device  Until discontinued      01/11/18 0351          The patients clinical status was discussed at multidisplinary rounds, involving transplant surgery, transplant medicine, pharmacy, nursing, nutrition, and social work    Discharge Planning: not stable for discharge at this time. Dc pending improvement in nutrition and appetite.  Monitor kidney function --> may need outpt HD  Monitor HH needs vs rehab for deconditioned status      Shereen Mcguire, MATILDE  Liver Transplant  [facility]

## 2018-03-18 NOTE — ASSESSMENT & PLAN NOTE
- Poor PO intake since transplant requiring short course of TPN during previous hospital stay.   - Dietary consulted. Will need to closely monitor PO intake.   - supplements also ordered, refusing to drink  - SHANE with tube feeding start 1/16. Stopped 1/17/18, was not tolerating. Shane removed 1/20.  - prealbumin 7 on 1/15 --> prealbumin 10 on 3/12, monitoring weekly  - TPN started 1/16/18.   - GI consulted - EGD reviewed, increased ppi bid, miralax bid, and metamucil bid, decreased narcotics.  - Poorly tolerated TF, had worsening nausea. Not tolerating well due to nausea.   - small bowel follow through with normal findings.   - consulted gi for possible gj tube placement, holding off on this for now as with ongoing fevers.  - retrial marinol 2/22 as pt reports that's the only thing helping his appetite, in the evening 2/2 concern for possible drowsiness --> marinol d/c due to neuro symptoms and sedation  - Pt then developed persistent N/V again and TPN started 2/24. Cont TPN for now.   - EGD 3/6 with normal esophagus    - congested, erythematous and nodular mucosa in the stomach.    - biliary stent in the duodenum    - one duodenal ulcer with a clean ulcer base (Emir Class III).    - biopsies taken in the duodenal bulb and second and third portion of the duodenum.  - Hold Reglan for neuro symptoms.  - Continue TPN, tolerating diet, but has minimal appetite, eating 25% 2/3 meals, no appetite 2/2 feeling full/N/V.  - Calorie count continued, patient refusing shane tube

## 2018-03-18 NOTE — ASSESSMENT & PLAN NOTE
- HD resumed on previous admission. Now anuric and dialyzes M-W-F.  - Nephrology following.    - Goal is for kidney txp in future- currently not a candidate due to health status.  - urine output has greatly improved over last several days, 990ml/24 hours.   - Will give concentrated albumin x 3 doses today.

## 2018-03-18 NOTE — SUBJECTIVE & OBJECTIVE
Scheduled Meds:   albumin human 25%  25 g Intravenous Q4H    bisacodyl  10 mg Oral Daily    ceFEPime (MAXIPIME) IVPB  1 g Intravenous Q12H    cycloSPORINE modified  100 mg Oral BID    docusate sodium  100 mg Oral BID    epoetin maurisio (PROCRIT) injection  10,000 Units Intravenous Every Mon, Wed, Fri    ergocalciferol  50,000 Units Oral Q7 Days    fat emulsion 20%  250 mL Intravenous Once    heparin (porcine)  5,000 Units Subcutaneous Q8H    custom IVPB builder   Intravenous Q24H    levetiracetam oral soln  500 mg Oral BID    levothyroxine  75 mcg Oral Before breakfast    metronidazole  500 mg Intravenous Q8H    multivitamin  1 tablet Oral Daily    omeprazole  40 mg Oral QAM    polyethylene glycol  17 g Oral BID    predniSONE  20 mg Oral Daily    psyllium husk (aspartame)  3.4 g Oral BID    ursodiol  300 mg Oral BID     Continuous Infusions:   TPN ADULT CENTRAL LINE CUSTOM 48 mL/hr at 03/17/18 2126    TPN ADULT CENTRAL LINE CUSTOM       PRN Meds:sodium chloride 0.9%, acetaminophen, albuterol-ipratropium 2.5mg-0.5mg/3mL, bisacodyl, dextrose 50%, dextrose 50%, diphenhydrAMINE-zinc acetate 1-0.1%, glucagon (human recombinant), glucose, glucose, heparin (porcine), naloxone, ondansetron, ondansetron, oxyCODONE, oxyCODONE, prochlorperazine, simethicone, sodium chloride 0.9%, sodium chloride 0.9%    Review of Systems   Constitutional: Positive for activity change, appetite change, fatigue and fever.   HENT: Negative.  Negative for facial swelling.    Eyes: Negative.    Respiratory: Negative.  Negative for apnea, chest tightness, shortness of breath and wheezing.    Cardiovascular: Negative.  Negative for chest pain, palpitations and leg swelling.   Gastrointestinal: Positive for abdominal distention, abdominal pain and constipation. Negative for diarrhea, nausea and vomiting.   Genitourinary: Positive for decreased urine volume. Negative for difficulty urinating, dysuria, hematuria and urgency.    Musculoskeletal: Negative.  Negative for back pain, gait problem, neck pain and neck stiffness.   Skin: Negative.  Negative for color change and pallor.   Allergic/Immunologic: Positive for immunocompromised state.   Neurological: Negative.  Negative for dizziness, seizures, weakness, light-headedness and headaches.   Psychiatric/Behavioral: Positive for dysphoric mood. Negative for behavioral problems, confusion, hallucinations, sleep disturbance and suicidal ideas. The patient is not nervous/anxious.         Tearful     Objective:     Vital Signs (Most Recent):  Temp: 98.8 °F (37.1 °C) (03/18/18 0753)  Pulse: 88 (03/18/18 0753)  Resp: 18 (03/18/18 0728)  BP: (!) 152/84 (03/18/18 0753)  SpO2: 97 % (03/18/18 0728) Vital Signs (24h Range):  Temp:  [98.5 °F (36.9 °C)-99.3 °F (37.4 °C)] 98.8 °F (37.1 °C)  Pulse:  [78-92] 88  Resp:  [16-20] 18  SpO2:  [97 %-98 %] 97 %  BP: (136-158)/(78-92) 152/84     Weight: 74.8 kg (164 lb 14.5 oz)  Body mass index is 26.62 kg/m².    Intake/Output - Last 3 Shifts       03/16 0700 - 03/17 0659 03/17 0700 - 03/18 0659 03/18 0700 - 03/19 0659    P.O.  360 180    Other 600      IV Piggyback 400 300     TPN  1171.7     Total Intake(mL/kg) 1000 (13.4) 1831.7 (24.5) 180 (2.4)    Urine (mL/kg/hr) 585 (0.3) 990 (0.6) 165 (0.5)    Emesis/NG output  0 (0) 0 (0)    Other 4108 (2.3) 0 (0) 0 (0)    Stool 0 (0) 0 (0) 0 (0)    Blood  0 (0) 0 (0)    Total Output 4693 990 165    Net -3693 +841.7 +15           Urine Occurrence  0 x 0 x    Stool Occurrence 0 x 0 x 0 x    Emesis Occurrence  0 x 0 x          Physical Exam   Constitutional: He is oriented to person, place, and time. He appears well-developed.   Hand and temporal muscle wasting   HENT:   Head: Normocephalic.   Eyes: Pupils are equal, round, and reactive to light. No scleral icterus.   Cardiovascular: Normal rate, regular rhythm, normal heart sounds and intact distal pulses.    Pulmonary/Chest: Effort normal. No respiratory distress. He has  decreased breath sounds in the right lower field and the left lower field. He has no wheezes. He has no rhonchi. He has no rales.   Abdominal: Soft. Bowel sounds are normal. He exhibits distension. There is no tenderness.   Pain with palpation   Musculoskeletal: Normal range of motion. He exhibits edema (generalized).   Neurological: He is alert and oriented to person, place, and time.   Skin: Skin is warm and dry.   Psychiatric: He has a normal mood and affect. His behavior is normal. Judgment and thought content normal.   Nursing note and vitals reviewed.      Laboratory:  Immunosuppressants         Stop Route Frequency     cycloSPORINE modified (NEORAL) capsule 100 mg      -- Oral 2 times daily     cycloSPORINE modified (NEORAL) 25 MG capsule      03/09 1714       cycloSPORINE modified (NEORAL) 100 MG capsule      03/09 1714       cycloSPORINE modified (NEORAL) 100 MG capsule      03/09 0514       cycloSPORINE modified (NEORAL) 25 MG capsule      03/09 0514          CBC:   Recent Labs  Lab 03/18/18  0500   WBC 6.98   RBC 2.66*   HGB 8.6*   HCT 26.2*   *   MCV 99*   MCH 32.3*   MCHC 32.8     CMP:   Recent Labs  Lab 03/18/18  0500   GLU 88   CALCIUM 8.6*   ALBUMIN 2.0*   PROT 5.9*      K 3.6   CO2 24      BUN 58*   CREATININE 3.3*   ALKPHOS 105   ALT 5*   AST 28   BILITOT 1.4*     Coagulation:   Recent Labs  Lab 03/12/18  1004   INR 1.1     Labs within the past 24 hours have been reviewed.    Diagnostic Results:  I have personally reviewed all pertinent imaging studies.

## 2018-03-18 NOTE — PLAN OF CARE
Problem: Patient Care Overview  Goal: Plan of Care Review  Outcome: Ongoing (interventions implemented as appropriate)  Pt AAO x 4. Pt 1 - person assist out of bed. Pt received albumin today.   Urine output 540 ml thus far this shift.  Pt experiencing nausea with one episode of emesis. Zofran providing moderate relief. No pain medication given thus far this shift.   Pt free of falls and injury. Pt wearing nonskid socks/shoes when out of bed, bed in lowest position, side rails up x 2, call light within reach.

## 2018-03-18 NOTE — ASSESSMENT & PLAN NOTE
- Started on vanc/cefepime for ERCP then transitioned to vanc/zosyn.  - Blood cultures sent, prelim NGTD. Repeat blood cultures and chest xray today 2/10.  - thoracentesis 2/1 - fluid negative for infection.  - now cmv positive - started treatment 2/5/18. CMV PCR 2/15 negative  - taken to OR 2/7 for exp lap - fluid collections drained, cultures NGTD.  - CT A/P 2/15 with no evidence of IA fluid infection. Did show R pleural effusion s/p thoracentesis, cell count negative for infection, cx pending.  - Repeat blood cx 2/15 prelim NGTD  - Bilirubin remains elevated. Concerning for cholangitis. Antibiotics broadened 2/17. ID re consulted.   - ERCP  2/19, overall unremarkable findings. Did place new larger stents. Will need repeat ERCP in 10 weeks. Bilirubin is stable post ERCP.  - Tmax 101 2/21 PM again ID following. Plan to transition vanc/zosyn to ertapenem 2/22 as no improvement noted.   - Erta started 2/22.  - CT A/P obtained 2/26 with focal/loculated region of fluid in the lower right abdomen measuring approximately 8.7 x 5.5 cm.   - IR attempted drainage on fluid collection on 2/27, however, per IR,there was limited fluid interposed with bowel in the RLQ with no safe pocket seen for drainage.  - Tagged WBC scan 2/27, negative results.  - Remained with intermittent fevers without clear infection source.   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 EBV PCR negative. Adenovirus negative. Repeat CMV PCR negative.    - Back pain and abdominal pain persists. Last BM 3/13 with brown bomb.   - ID following. Cont Ertapenem dc'd 3/7 - due to AMS --> Tmax 101.1 overnight 3/9 - Cefepime/vanc/flagyl.  Monitor.   - Remains afebrile x several days  - Bx with cholestatic hepatitis --> couldn't cx secondary to EUS contamination --> will cx next bx  - Plan to continue abx x 14 days to treat cholangitis --> stop date 3/23.  - Bandemia noted on labs 3/14 was 6 then 3/15 and 3/16 was 7.0, now trending down. Continue to  monitor.

## 2018-03-19 LAB
ALBUMIN SERPL BCP-MCNC: 2.8 G/DL
ALP SERPL-CCNC: 95 U/L
ALT SERPL W/O P-5'-P-CCNC: 7 U/L
ANION GAP SERPL CALC-SCNC: 14 MMOL/L
ANISOCYTOSIS BLD QL SMEAR: SLIGHT
AST SERPL-CCNC: 26 U/L
BASOPHILS # BLD AUTO: ABNORMAL K/UL
BASOPHILS NFR BLD: 0 %
BILIRUB SERPL-MCNC: 1.5 MG/DL
BUN SERPL-MCNC: 71 MG/DL
CALCIUM SERPL-MCNC: 9.3 MG/DL
CHLORIDE SERPL-SCNC: 105 MMOL/L
CO2 SERPL-SCNC: 22 MMOL/L
CREAT SERPL-MCNC: 3.8 MG/DL
CYCLOSPORINE BLD LC/MS/MS-MCNC: 165 NG/ML
DACRYOCYTES BLD QL SMEAR: ABNORMAL
DIFFERENTIAL METHOD: ABNORMAL
EOSINOPHIL # BLD AUTO: ABNORMAL K/UL
EOSINOPHIL NFR BLD: 1 %
ERYTHROCYTE [DISTWIDTH] IN BLOOD BY AUTOMATED COUNT: 19.7 %
EST. GFR  (AFRICAN AMERICAN): 23.5 ML/MIN/1.73 M^2
EST. GFR  (NON AFRICAN AMERICAN): 20.3 ML/MIN/1.73 M^2
GLUCOSE SERPL-MCNC: 87 MG/DL
HCT VFR BLD AUTO: 25.1 %
HGB BLD-MCNC: 8.2 G/DL
HYPOCHROMIA BLD QL SMEAR: ABNORMAL
IMM GRANULOCYTES # BLD AUTO: ABNORMAL K/UL
IMM GRANULOCYTES NFR BLD AUTO: ABNORMAL %
LYMPHOCYTES # BLD AUTO: ABNORMAL K/UL
LYMPHOCYTES NFR BLD: 16 %
MAGNESIUM SERPL-MCNC: 1.9 MG/DL
MCH RBC QN AUTO: 32.5 PG
MCHC RBC AUTO-ENTMCNC: 32.7 G/DL
MCV RBC AUTO: 100 FL
MONOCYTES # BLD AUTO: ABNORMAL K/UL
MONOCYTES NFR BLD: 5 %
NEUTROPHILS NFR BLD: 73 %
NEUTS BAND NFR BLD MANUAL: 5 %
NRBC BLD-RTO: 0 /100 WBC
OVALOCYTES BLD QL SMEAR: ABNORMAL
PHOSPHATE SERPL-MCNC: 5.6 MG/DL
PLATELET # BLD AUTO: 98 K/UL
PMV BLD AUTO: 12 FL
POCT GLUCOSE: 128 MG/DL (ref 70–110)
POIKILOCYTOSIS BLD QL SMEAR: SLIGHT
POLYCHROMASIA BLD QL SMEAR: ABNORMAL
POTASSIUM SERPL-SCNC: 3.7 MMOL/L
PREALB SERPL-MCNC: 22 MG/DL
PROT SERPL-MCNC: 6.2 G/DL
RBC # BLD AUTO: 2.52 M/UL
SODIUM SERPL-SCNC: 141 MMOL/L
WBC # BLD AUTO: 5.96 K/UL

## 2018-03-19 PROCEDURE — 99233 SBSQ HOSP IP/OBS HIGH 50: CPT | Mod: ,,, | Performed by: NURSE PRACTITIONER

## 2018-03-19 PROCEDURE — 63600175 PHARM REV CODE 636 W HCPCS: Performed by: NURSE PRACTITIONER

## 2018-03-19 PROCEDURE — 84100 ASSAY OF PHOSPHORUS: CPT

## 2018-03-19 PROCEDURE — 80158 DRUG ASSAY CYCLOSPORINE: CPT

## 2018-03-19 PROCEDURE — 80053 COMPREHEN METABOLIC PANEL: CPT

## 2018-03-19 PROCEDURE — 20600001 HC STEP DOWN PRIVATE ROOM

## 2018-03-19 PROCEDURE — 25000003 PHARM REV CODE 250: Performed by: PHYSICIAN ASSISTANT

## 2018-03-19 PROCEDURE — 85027 COMPLETE CBC AUTOMATED: CPT

## 2018-03-19 PROCEDURE — 25000003 PHARM REV CODE 250: Performed by: SURGERY

## 2018-03-19 PROCEDURE — 25000003 PHARM REV CODE 250: Performed by: NURSE PRACTITIONER

## 2018-03-19 PROCEDURE — A4217 STERILE WATER/SALINE, 500 ML: HCPCS | Performed by: NURSE PRACTITIONER

## 2018-03-19 PROCEDURE — 85007 BL SMEAR W/DIFF WBC COUNT: CPT

## 2018-03-19 PROCEDURE — B4185 PARENTERAL SOL 10 GM LIPIDS: HCPCS | Performed by: NURSE PRACTITIONER

## 2018-03-19 PROCEDURE — 25000003 PHARM REV CODE 250: Performed by: INTERNAL MEDICINE

## 2018-03-19 PROCEDURE — 63600175 PHARM REV CODE 636 W HCPCS: Performed by: PHYSICIAN ASSISTANT

## 2018-03-19 PROCEDURE — 90935 HEMODIALYSIS ONE EVALUATION: CPT

## 2018-03-19 PROCEDURE — 83735 ASSAY OF MAGNESIUM: CPT

## 2018-03-19 PROCEDURE — 84134 ASSAY OF PREALBUMIN: CPT

## 2018-03-19 PROCEDURE — 63600175 PHARM REV CODE 636 W HCPCS: Mod: JG | Performed by: SURGERY

## 2018-03-19 PROCEDURE — S0030 INJECTION, METRONIDAZOLE: HCPCS | Performed by: NURSE PRACTITIONER

## 2018-03-19 PROCEDURE — 90935 HEMODIALYSIS ONE EVALUATION: CPT | Mod: ,,, | Performed by: INTERNAL MEDICINE

## 2018-03-19 PROCEDURE — 99233 SBSQ HOSP IP/OBS HIGH 50: CPT | Mod: ,,, | Performed by: INTERNAL MEDICINE

## 2018-03-19 RX ORDER — DRONABINOL 2.5 MG/1
2.5 CAPSULE ORAL NIGHTLY
Status: DISCONTINUED | OUTPATIENT
Start: 2018-03-19 | End: 2018-04-10 | Stop reason: HOSPADM

## 2018-03-19 RX ORDER — BUTALBITAL, ACETAMINOPHEN AND CAFFEINE 50; 325; 40 MG/1; MG/1; MG/1
1 TABLET ORAL EVERY 6 HOURS PRN
Status: DISCONTINUED | OUTPATIENT
Start: 2018-03-19 | End: 2018-03-19

## 2018-03-19 RX ORDER — SODIUM CHLORIDE 9 MG/ML
INJECTION, SOLUTION INTRAVENOUS
Status: DISCONTINUED | OUTPATIENT
Start: 2018-03-19 | End: 2018-04-10 | Stop reason: HOSPADM

## 2018-03-19 RX ORDER — BUTALBITAL, ACETAMINOPHEN AND CAFFEINE 50; 325; 40 MG/1; MG/1; MG/1
1 TABLET ORAL EVERY 4 HOURS PRN
Status: DISCONTINUED | OUTPATIENT
Start: 2018-03-19 | End: 2018-04-10 | Stop reason: HOSPADM

## 2018-03-19 RX ORDER — SUCRALFATE 1 G/10ML
1 SUSPENSION ORAL EVERY 6 HOURS
Status: CANCELLED | OUTPATIENT
Start: 2018-03-19

## 2018-03-19 RX ORDER — METOCLOPRAMIDE HYDROCHLORIDE 5 MG/5ML
10 SOLUTION ORAL
Status: DISCONTINUED | OUTPATIENT
Start: 2018-03-19 | End: 2018-04-09

## 2018-03-19 RX ORDER — SODIUM CHLORIDE 9 MG/ML
INJECTION, SOLUTION INTRAVENOUS ONCE
Status: COMPLETED | OUTPATIENT
Start: 2018-03-19 | End: 2018-03-19

## 2018-03-19 RX ADMIN — SOYBEAN OIL 250 ML: 20 INJECTION, SOLUTION INTRAVENOUS at 10:03

## 2018-03-19 RX ADMIN — SODIUM CHLORIDE: 9 INJECTION, SOLUTION INTRAVENOUS at 08:03

## 2018-03-19 RX ADMIN — CEFEPIME 1 G: 1 INJECTION, POWDER, FOR SOLUTION INTRAMUSCULAR; INTRAVENOUS at 04:03

## 2018-03-19 RX ADMIN — BISACODYL 10 MG: 5 TABLET, COATED ORAL at 05:03

## 2018-03-19 RX ADMIN — CEFEPIME 1 G: 1 INJECTION, POWDER, FOR SOLUTION INTRAMUSCULAR; INTRAVENOUS at 05:03

## 2018-03-19 RX ADMIN — DOCUSATE SODIUM 100 MG: 100 CAPSULE, LIQUID FILLED ORAL at 10:03

## 2018-03-19 RX ADMIN — METOCLOPRAMIDE HYDROCHLORIDE 10 MG: 5 SOLUTION ORAL at 05:03

## 2018-03-19 RX ADMIN — DOCUSATE SODIUM 100 MG: 100 CAPSULE, LIQUID FILLED ORAL at 05:03

## 2018-03-19 RX ADMIN — BUTALBITAL, ACETAMINOPHEN AND CAFFEINE 1 TABLET: 50; 325; 40 TABLET ORAL at 03:03

## 2018-03-19 RX ADMIN — SODIUM CHLORIDE: 9 INJECTION, SOLUTION INTRAVENOUS at 12:03

## 2018-03-19 RX ADMIN — ERYTHROPOIETIN 10000 UNITS: 10000 INJECTION, SOLUTION INTRAVENOUS; SUBCUTANEOUS at 11:03

## 2018-03-19 RX ADMIN — URSODIOL 300 MG: 300 CAPSULE ORAL at 10:03

## 2018-03-19 RX ADMIN — PREDNISONE 20 MG: 10 TABLET ORAL at 05:03

## 2018-03-19 RX ADMIN — SODIUM CHLORIDE: 234 INJECTION INTRAMUSCULAR; INTRAVENOUS; SUBCUTANEOUS at 10:03

## 2018-03-19 RX ADMIN — OXYCODONE HYDROCHLORIDE 5 MG: 5 TABLET ORAL at 12:03

## 2018-03-19 RX ADMIN — OXYCODONE HYDROCHLORIDE 5 MG: 5 TABLET ORAL at 10:03

## 2018-03-19 RX ADMIN — LEVETIRACETAM 500 MG: 100 SOLUTION ORAL at 10:03

## 2018-03-19 RX ADMIN — ONDANSETRON HYDROCHLORIDE 4 MG: 2 INJECTION, SOLUTION INTRAMUSCULAR; INTRAVENOUS at 11:03

## 2018-03-19 RX ADMIN — OMEPRAZOLE 40 MG: 40 CAPSULE, DELAYED RELEASE ORAL at 05:03

## 2018-03-19 RX ADMIN — LEVOTHYROXINE SODIUM 75 MCG: 75 TABLET ORAL at 05:03

## 2018-03-19 RX ADMIN — METRONIDAZOLE 500 MG: 500 INJECTION, SOLUTION INTRAVENOUS at 02:03

## 2018-03-19 RX ADMIN — METRONIDAZOLE 500 MG: 500 INJECTION, SOLUTION INTRAVENOUS at 04:03

## 2018-03-19 RX ADMIN — URSODIOL 300 MG: 300 CAPSULE ORAL at 05:03

## 2018-03-19 RX ADMIN — DRONABINOL 2.5 MG: 2.5 CAPSULE ORAL at 10:03

## 2018-03-19 RX ADMIN — METOCLOPRAMIDE HYDROCHLORIDE 10 MG: 5 SOLUTION ORAL at 10:03

## 2018-03-19 RX ADMIN — CYCLOSPORINE 100 MG: 100 CAPSULE, LIQUID FILLED ORAL at 05:03

## 2018-03-19 RX ADMIN — OXYCODONE HYDROCHLORIDE 5 MG: 5 TABLET ORAL at 08:03

## 2018-03-19 RX ADMIN — LEVETIRACETAM 500 MG: 100 SOLUTION ORAL at 05:03

## 2018-03-19 RX ADMIN — CYCLOSPORINE 125 MG: 100 CAPSULE, LIQUID FILLED ORAL at 05:03

## 2018-03-19 RX ADMIN — HEPARIN SODIUM 1000 UNITS: 1000 INJECTION, SOLUTION INTRAVENOUS; SUBCUTANEOUS at 11:03

## 2018-03-19 NOTE — PLAN OF CARE
Problem: Patient Care Overview  Goal: Plan of Care Review  Outcome: Ongoing (interventions implemented as appropriate)  Pt AAOx4. VSS.  HD this am, 3.5 hours, 3L removed, no issues.  Dressing to R permacath and R IJ TLC changed, CDI.  Dressing to old surgical wound CDI.  Next dressing change due tomorrow.  Pt complaining of headache and pain today. Oxy 5 mg given this morning.  Fioricet ordered and given for headache.  Moderate relief obtained.  Appetite remains minimal.  Pt not able to eat at all today.  PO intake encouraged.  Marinol and reglan restarted.  TPN running @ 48 cc/hr.  To continue tonight.  375 cc carlos colored urine output so far this shift.  No BMs.  Last BM on Saturday. Will monitor.  Pt refusing heparin.  Activity encouraged.  Pt seen walking in room and hallway with mother.    Bed lowered and locked in place.  Call bell in reach. Personal items at bedside. Side rails up x 2.  No acute events/falls/injuries.  See flowsheet for further assessment findings.  Will monitor.  Discharge pending improvement in nutrition status.

## 2018-03-19 NOTE — ASSESSMENT & PLAN NOTE
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, CMV D+/R+, steroid induction, on maintenance tacro/MMF/pred; c/b seizures, LAURA, superificial wound infection s/p wound vac to [facility] through 1/12, and several recent admissions on 11/24 and 12/3 with culture negative peritonitis with peak ascites WBC ~5k due to suspected indolent biliary leakage s/p sphincterotomy/biliary stent placement over CBD stricture on 12/6 ERCP vs another IA source) who was admitted on 1/11/2017 with acute onset fevers and acute on chronic N/V/abdominal pain (RLQ worst) and was found to have a loculated RLQ collection on imaging (not safe for percutaneous drainage), for which, after failure with more conservative treatments, he ultimately underwent laparoscopic drainage on 2/7/2018 (with dark brown fluid drained, culture negative but after extensive antibiotic exposure) but without complete resolution. He achieved defervescence but had persistent N/V/abdominal pain on empiric ertapenem with persistent loculated fluid noted on imaging that is not accessible to percutaneous drainage. Extensive FUO workup has been performed that has failed to yield another obvious etiology for his fevers (including serial chest CT's, bland thoracenteses, negative fungal markers/quant gold intermediate, serial CT A/P with IV/oral contrast at times notable for residual fluid in RLQ abscess following drainage last seen on 2/26 CT A/P 8x5cm; negative sinus CT; repeat EGD with erythematous, congested, nodular gastric mucosa and a duodenal ulcer 3/6/2018 - biopsy pending; negative SBFT; negative TTE; negative ultrasound for thrombus; serial ERCP's without recent pathology; negative liver biopsy; negative MRI; 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 He remains tenuous but has been afebrile without a leukocytosis after >1 month of antibiotics. Would consider a cautious tapering of agents  now.     - would continue empiric cefepime for now   - would stop flagyl today  - would monitor GI symptoms and fever curve with cessation of antibiotics -- if they relapse, would repeat CT A/P to reassess RLQ fluid collection and consider lower endoscopy since I do not think he has undergone this evaluation yet in extensive FUO workup  - additional prophylaxis per protocol (valcyte, atovaquone given bactrim allergy, and isavuconazole given complex course -- will also taper off the later as expected if he does well)

## 2018-03-19 NOTE — PLAN OF CARE
Problem: Patient Care Overview  Goal: Plan of Care Review  Outcome: Ongoing (interventions implemented as appropriate)  Plan of care reviewed with patient. Pt verbalized understanding. Pt AAOX4. Pt free from falls, injuries and trauma.  Pt free from skin breakdown.  Pt VSS and in NAD.  Pt afebrile.  Pt had no complaints of SOB, N/V or CP.  Pts pain moderately controlled with prn pain medications. Adequate UOP this shift. Pt had uneventful evening. Pt in low locked bed with personal items and call light within reach. Fall precautions maintained.

## 2018-03-19 NOTE — PROGRESS NOTES
Pt c/o severe headache 9/10 since returning from dialysis. Oxy 5 mg given at 1240 with little relief obtained.  Shereen Mcguire DNP notified.  Order placed for Fioricet PRN q6h    Will give medication and continue to monitor.

## 2018-03-19 NOTE — PT/OT/SLP PROGRESS
Physical Therapy      Patient Name:  Jhonny Diana   MRN:  [id number]    Patient not seen today secondary to Patient fatigue, Dialysis. Attempted to see pt 3 times today: AM - pt off floor for dialysis; PM - pt c c/o of headache and fatigue request therapy to return at later time; PM - pt sleeping, mother request therapy to return at later date.  Will follow-up next scheduled date.    William Yost, PT   3/19/2018

## 2018-03-19 NOTE — SUBJECTIVE & OBJECTIVE
Scheduled Meds:   bisacodyl  10 mg Oral Daily    ceFEPime (MAXIPIME) IVPB  1 g Intravenous Q12H    cycloSPORINE modified  100 mg Oral BID    docusate sodium  100 mg Oral BID    epoetin maurisio (PROCRIT) injection  10,000 Units Intravenous Every Mon, Wed, Fri    ergocalciferol  50,000 Units Oral Q7 Days    fat emulsion 20%  250 mL Intravenous Once    heparin (porcine)  5,000 Units Subcutaneous Q8H    custom IVPB builder   Intravenous Q24H    levetiracetam oral soln  500 mg Oral BID    levothyroxine  75 mcg Oral Before breakfast    metronidazole  500 mg Intravenous Q8H    multivitamin  1 tablet Oral Daily    omeprazole  40 mg Oral QAM    polyethylene glycol  17 g Oral BID    predniSONE  20 mg Oral Daily    psyllium husk (aspartame)  3.4 g Oral BID    ursodiol  300 mg Oral BID     Continuous Infusions:   TPN ADULT CENTRAL LINE CUSTOM 48 mL/hr at 03/18/18 2125    TPN ADULT CENTRAL LINE CUSTOM       PRN Meds:sodium chloride 0.9%, acetaminophen, albuterol-ipratropium 2.5mg-0.5mg/3mL, bisacodyl, dextrose 50%, dextrose 50%, diphenhydrAMINE-zinc acetate 1-0.1%, glucagon (human recombinant), glucose, glucose, heparin (porcine), naloxone, ondansetron, ondansetron, oxyCODONE, oxyCODONE, prochlorperazine, simethicone, sodium chloride 0.9%, sodium chloride 0.9%    Review of Systems   Constitutional: Positive for activity change, appetite change and fatigue. Negative for fever.   HENT: Negative.  Negative for facial swelling.    Eyes: Negative.    Respiratory: Negative.  Negative for apnea, chest tightness, shortness of breath and wheezing.    Cardiovascular: Negative.  Negative for chest pain, palpitations and leg swelling.   Gastrointestinal: Positive for abdominal distention, abdominal pain and constipation. Negative for diarrhea, nausea and vomiting.   Genitourinary: Positive for decreased urine volume. Negative for difficulty urinating, dysuria, hematuria and urgency.   Musculoskeletal: Negative.  Negative  for back pain, gait problem, neck pain and neck stiffness.   Skin: Negative.  Negative for color change and pallor.   Allergic/Immunologic: Positive for immunocompromised state.   Neurological: Negative.  Negative for dizziness, seizures, weakness, light-headedness and headaches.   Psychiatric/Behavioral: Positive for dysphoric mood. Negative for behavioral problems, confusion, hallucinations, sleep disturbance and suicidal ideas. The patient is not nervous/anxious.         Tearful     Objective:     Vital Signs (Most Recent):  Temp: 98.4 °F (36.9 °C) (03/19/18 1231)  Pulse: 70 (03/19/18 1231)  Resp: 18 (03/19/18 1231)  BP: (!) 144/80 (03/19/18 1231)  SpO2: 98 % (03/19/18 1231) Vital Signs (24h Range):  Temp:  [97.7 °F (36.5 °C)-99.3 °F (37.4 °C)] 98.4 °F (36.9 °C)  Pulse:  [63-89] 70  Resp:  [15-18] 18  SpO2:  [94 %-98 %] 98 %  BP: (142-165)/(80-99) 144/80     Weight: 74.8 kg (164 lb 14.5 oz)  Body mass index is 26.62 kg/m².    Intake/Output - Last 3 Shifts       03/17 0700 - 03/18 0659 03/18 0700 - 03/19 0659 03/19 0700 - 03/20 0659    P.O. 360 315     Other       IV Piggyback 300 100     TPN 1171.7 1151.2 700    Total Intake(mL/kg) 1831.7 (24.5) 1566.2 (20.9) 700 (9.4)    Urine (mL/kg/hr) 990 (0.6) 915 (0.5) 375 (0.6)    Emesis/NG output 0 (0) 0 (0)     Other 0 (0) 0 (0)     Stool 0 (0) 0 (0) 0 (0)    Blood 0 (0) 0 (0)     Total Output 990 915 375    Net +841.7 +651.2 +325           Urine Occurrence 0 x 0 x 0 x    Stool Occurrence 0 x 0 x 0 x    Emesis Occurrence 0 x 0 x           Physical Exam   Constitutional: He is oriented to person, place, and time. He appears well-developed.   Hand and temporal muscle wasting   HENT:   Head: Normocephalic.   Eyes: Pupils are equal, round, and reactive to light. No scleral icterus.   Cardiovascular: Normal rate, regular rhythm, normal heart sounds and intact distal pulses.    Pulmonary/Chest: Effort normal. No respiratory distress. He has decreased breath sounds in the right  lower field and the left lower field. He has no wheezes. He has no rhonchi. He has no rales.   Abdominal: Soft. Bowel sounds are normal. He exhibits distension. There is no tenderness.   Pain with palpation   Musculoskeletal: Normal range of motion. He exhibits edema (generalized).   Neurological: He is alert and oriented to person, place, and time.   Skin: Skin is warm and dry.   Psychiatric: He has a normal mood and affect. His behavior is normal. Judgment and thought content normal.   Nursing note and vitals reviewed.      Laboratory:  Immunosuppressants         Stop Route Frequency     cycloSPORINE modified (NEORAL) capsule 100 mg      -- Oral 2 times daily     cycloSPORINE modified (NEORAL) 25 MG capsule      03/09 1714       cycloSPORINE modified (NEORAL) 100 MG capsule      03/09 1714       cycloSPORINE modified (NEORAL) 100 MG capsule      03/09 0514       cycloSPORINE modified (NEORAL) 25 MG capsule      03/09 0514          CBC:   Recent Labs  Lab 03/19/18  0500   WBC 5.96   RBC 2.52*   HGB 8.2*   HCT 25.1*   PLT 98*   *   MCH 32.5*   MCHC 32.7     CMP:   Recent Labs  Lab 03/19/18  0500   GLU 87   CALCIUM 9.3   ALBUMIN 2.8*   PROT 6.2      K 3.7   CO2 22*      BUN 71*   CREATININE 3.8*   ALKPHOS 95   ALT 7*   AST 26   BILITOT 1.5*     Coagulation: No results for input(s): PT, INR, APTT in the last 168 hours.  Labs within the past 24 hours have been reviewed.    Diagnostic Results:  I have personally reviewed all pertinent imaging studies.

## 2018-03-19 NOTE — PROGRESS NOTES
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, CMV D+/R+, steroid induction, on maintenance tacro/MMF/pred; c/b seizures, LAURA, superificial wound infection s/p wound vac to [facility] through 1/12, and several recent admissions on 11/24 and 12/3 with culture negative peritonitis with peak ascites WBC ~5k due to suspected indolent biliary leakage s/p sphincterotomy/biliary stent placement over CBD stricture on 12/6 ERCP vs another IA source) who was admitted on 1/11/2017 with acute onset fevers and acute on chronic N/V/abdominal pain (RLQ worst) and was found to have a loculated RLQ collection on imaging (not safe for percutaneous drainage), for which, after failure with more conservative treatments, he ultimately underwent laparoscopic drainage on 2/7/2018 (with dark brown fluid drained, culture negative but after extensive antibiotic exposure) but without complete resolution. He achieved defervescence but had persistent N/V/abdominal pain on empiric ertapenem with persistent loculated fluid noted on imaging that is not accessible to percutaneous drainage. Extensive FUO workup has been performed that has failed to yield another obvious etiology for his fevers (including serial chest CT's, bland thoracenteses, negative fungal markers/quant gold intermediate, serial CT A/P with IV/oral contrast at times notable for residual fluid in RLQ abscess following drainage last seen on 2/26 CT A/P 8x5cm; negative sinus CT; repeat EGD with erythematous, congested, nodular gastric mucosa and a duodenal ulcer 3/6/2018 - biopsy pending; negative SBFT; negative  TTE; negative ultrasound for thrombus; serial ERCP's without recent pathology; negative liver biopsy; negative MRI; 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 He remains tenuous but has been afebrile without a leukocytosis after >1 month of antibiotics. Would consider a cautious tapering of agents now.     - would continue empiric cefepime for now   - would stop flagyl today  - would monitor GI symptoms and fever curve with cessation of antibiotics -- if they relapse, would repeat CT A/P to reassess RLQ fluid collection and consider lower endoscopy since I do not think he has undergone this evaluation yet in extensive FUO workup  - additional prophylaxis per protocol (valcyte, atovaquone given bactrim allergy, and isavuconazole given complex course -- will also taper off the later as expected if he does well)            Anticipated Disposition: pending improvement    Thank you for your consult. I will follow-up with patient. Please contact us if you have any additional questions.     Melanie Bergman MD  Transplant ID Attending  [id number]    Melanie Bergman MD  Infectious Disease  [facility]    Subjective:     Principal Problem:Fever    HPI: No notes on file  Interval History: No acute change today in neuro status. Afebrile. Still with nausea and abdominal pain.     Review of Systems   Constitutional: Negative for activity change, appetite change, chills, fatigue and fever.   HENT: Negative for congestion and facial swelling.    Eyes: Negative for pain, discharge and visual disturbance.   Respiratory: Negative for cough, chest tightness, shortness of breath and wheezing.    Cardiovascular: Negative for chest pain, palpitations and leg swelling.   Gastrointestinal: Positive for abdominal distention, abdominal pain and nausea. Negative for constipation and vomiting.   Endocrine: Negative.    Genitourinary:        Anuric   Musculoskeletal: Negative for arthralgias, back pain and  myalgias.   Skin: Positive for wound. Negative for color change and rash.   Allergic/Immunologic: Positive for immunocompromised state.   Neurological: Negative for dizziness, seizures, weakness and headaches.   Psychiatric/Behavioral: Negative for confusion, decreased concentration and dysphoric mood. The patient is not nervous/anxious.    All other systems reviewed and are negative.      Objective:     Vital Signs (Most Recent):  Temp: 99.2 °F (37.3 °C) (02/17/18 1542)  Pulse: 90 (02/17/18 1542)  Resp: 16 (02/17/18 1542)  BP: 135/85 (02/17/18 1542)  SpO2: 95 % (02/17/18 1542) Vital Signs (24h Range):  Temp:  [98.6 °F (37 °C)-101.2 °F (38.4 °C)] 99.2 °F (37.3 °C)  Pulse:  [84-92] 90  Resp:  [16-20] 16  SpO2:  [92 %-96 %] 95 %  BP: (128-137)/(75-85) 135/85     Weight: 60.2 kg (132 lb 11.5 oz)  Body mass index is 21.42 kg/m².    Estimated Creatinine Clearance: 26.8 mL/min (A) (based on SCr of 3.5 mg/dL (H)).    Physical Exam   Constitutional: He is oriented to person, place, and time. He appears well-developed. No distress.   Temporal and distal extremity muscle wasting   HENT:   Head: Normocephalic and atraumatic.   Mouth/Throat: No oropharyngeal exudate.   dylan tube   Eyes: EOM are normal. Pupils are equal, round, and reactive to light. No scleral icterus.   Neck: Normal range of motion. Neck supple. No JVD present. No thyromegaly present.   Cardiovascular: Normal rate, regular rhythm, normal heart sounds and intact distal pulses.    No murmur heard.  Pulmonary/Chest: Effort normal. No respiratory distress. He has no wheezes. He exhibits no tenderness.   Diminished to RLL   Abdominal: Soft. Bowel sounds are normal. He exhibits ascites. He exhibits no distension. There is tenderness. There is no rebound and no guarding.   Dressing to chevron.  Wd vac removed 1/12/18  Dependent edema present R flank; surrounding erythema   Musculoskeletal: Normal range of motion. He exhibits no tenderness. Edema: 2+ LE edema.    Neurological: He is alert and oriented to person, place, and time. He has normal reflexes.   Skin: Skin is warm and dry. He is not diaphoretic. No erythema.   Psychiatric: His mood appears not anxious.   Nursing note and vitals reviewed.      Significant Labs:   CBC:     Recent Labs  Lab 02/16/18  0436 02/17/18  0343   WBC 2.85* 2.23*   HGB 8.8* 7.8*   HCT 26.8* 23.9*    134*     CMP:     Recent Labs  Lab 02/16/18  0436 02/17/18  0343   * 137   K 4.0 3.8    105   CO2 23 23   GLU 84 81   BUN 23* 13   CREATININE 5.1* 3.5*   CALCIUM 9.5 8.8   PROT 5.6* 5.1*   ALBUMIN 2.0* 1.8*   BILITOT 2.2* 2.1*   ALKPHOS 125 111   AST 25 18   ALT <5* <5*   ANIONGAP 11 9   EGFRNONAA 14.2* 22.4*       Significant Imaging: I have reviewed all pertinent imaging results/findings within the past 24 hours.     CT CAP:   Post surgical changes prior hepatic transplant.  Percutaneous surgical drain with notable decrease in size of fluid collection in lower abdomen.  Small amount of fluid tracking elsewhere within the abdomen and pelvis.  Large volume right pleural fluid, increased in size since prior exam, with associated right lower lobe atelectasis.  Diffuse body wall edema.  Small pericardial effusion.    Microbiology:  1/11 blood cx: negative  1/11 pleural fluid cx: negative  1/11 ascites cx: negative  1/17 blood cx: negative  1/18 blood cx: negative  1/19 pleural fluid cx: NGTD  1/19 ascites cx: NGTD  2/1 RVP negative  2/7 OR cx: NGTD  2/11 blood cx: negative  2/15 blood cx: negative  2/16 pleural fluid cx: negative

## 2018-03-19 NOTE — ASSESSMENT & PLAN NOTE
- Poor PO intake since transplant requiring short course of TPN during previous hospital stay.   - Dietary consulted. Will need to closely monitor PO intake.   - supplements also ordered, refusing to drink  - SHANE with tube feeding start 1/16. Stopped 1/17/18, was not tolerating. Shane removed 1/20.  - prealbumin 7 on 1/15 --> prealbumin 22 on 3/19, monitoring weekly  - TPN started 1/16/18.   - GI consulted - EGD reviewed, increased ppi bid, miralax bid, and metamucil bid, decreased narcotics.  - Poorly tolerated TF, had worsening nausea. Not tolerating well due to nausea.   - small bowel follow through with normal findings.   - consulted gi for possible gj tube placement, holding off on this for now as with ongoing fevers.  - retrial marinol 2/22 as pt reports that's the only thing helping his appetite, in the evening 2/2 concern for possible drowsiness --> marinol d/c due to neuro symptoms and sedation  - Pt then developed persistent N/V again and TPN started 2/24. Cont TPN for now.   - EGD 3/6 with normal esophagus    - congested, erythematous and nodular mucosa in the stomach.    - biliary stent in the duodenum    - one duodenal ulcer with a clean ulcer base (Emir Class III).    - biopsies taken in the duodenal bulb and second and third portion of the duodenum.  - Hold Reglan and Marinol 3/8 for neuro symptoms --> restarted reglan and marinol 3/19  - Continue TPN, tolerating diet, but has minimal appetite, eating 25% 2/3 meals, no appetite 2/2 feeling full/N/V.  - Calorie count continued, patient refusing shane tube

## 2018-03-19 NOTE — SUBJECTIVE & OBJECTIVE
Interval History: No acute change today in neuro status. Afebrile. Still with nausea and abdominal pain.     Review of Systems   Constitutional: Negative for activity change, appetite change, chills, fatigue and fever.   HENT: Negative for congestion and facial swelling.    Eyes: Negative for pain, discharge and visual disturbance.   Respiratory: Negative for cough, chest tightness, shortness of breath and wheezing.    Cardiovascular: Negative for chest pain, palpitations and leg swelling.   Gastrointestinal: Positive for abdominal distention, abdominal pain and nausea. Negative for constipation and vomiting.   Endocrine: Negative.    Genitourinary:        Anuric   Musculoskeletal: Negative for arthralgias, back pain and myalgias.   Skin: Positive for wound. Negative for color change and rash.   Allergic/Immunologic: Positive for immunocompromised state.   Neurological: Negative for dizziness, seizures, weakness and headaches.   Psychiatric/Behavioral: Negative for confusion, decreased concentration and dysphoric mood. The patient is not nervous/anxious.    All other systems reviewed and are negative.      Objective:     Vital Signs (Most Recent):  Temp: 99.2 °F (37.3 °C) (02/17/18 1542)  Pulse: 90 (02/17/18 1542)  Resp: 16 (02/17/18 1542)  BP: 135/85 (02/17/18 1542)  SpO2: 95 % (02/17/18 1542) Vital Signs (24h Range):  Temp:  [98.6 °F (37 °C)-101.2 °F (38.4 °C)] 99.2 °F (37.3 °C)  Pulse:  [84-92] 90  Resp:  [16-20] 16  SpO2:  [92 %-96 %] 95 %  BP: (128-137)/(75-85) 135/85     Weight: 60.2 kg (132 lb 11.5 oz)  Body mass index is 21.42 kg/m².    Estimated Creatinine Clearance: 26.8 mL/min (A) (based on SCr of 3.5 mg/dL (H)).    Physical Exam   Constitutional: He is oriented to person, place, and time. He appears well-developed. No distress.   Temporal and distal extremity muscle wasting   HENT:   Head: Normocephalic and atraumatic.   Mouth/Throat: No oropharyngeal exudate.   dylan tube   Eyes: EOM are normal. Pupils  are equal, round, and reactive to light. No scleral icterus.   Neck: Normal range of motion. Neck supple. No JVD present. No thyromegaly present.   Cardiovascular: Normal rate, regular rhythm, normal heart sounds and intact distal pulses.    No murmur heard.  Pulmonary/Chest: Effort normal. No respiratory distress. He has no wheezes. He exhibits no tenderness.   Diminished to RLL   Abdominal: Soft. Bowel sounds are normal. He exhibits ascites. He exhibits no distension. There is tenderness. There is no rebound and no guarding.   Dressing to chevron.  Wd vac removed 1/12/18  Dependent edema present R flank; surrounding erythema   Musculoskeletal: Normal range of motion. He exhibits no tenderness. Edema: 2+ LE edema.   Neurological: He is alert and oriented to person, place, and time. He has normal reflexes.   Skin: Skin is warm and dry. He is not diaphoretic. No erythema.   Psychiatric: His mood appears not anxious.   Nursing note and vitals reviewed.      Significant Labs:   CBC:     Recent Labs  Lab 02/16/18  0436 02/17/18  0343   WBC 2.85* 2.23*   HGB 8.8* 7.8*   HCT 26.8* 23.9*    134*     CMP:     Recent Labs  Lab 02/16/18  0436 02/17/18  0343   * 137   K 4.0 3.8    105   CO2 23 23   GLU 84 81   BUN 23* 13   CREATININE 5.1* 3.5*   CALCIUM 9.5 8.8   PROT 5.6* 5.1*   ALBUMIN 2.0* 1.8*   BILITOT 2.2* 2.1*   ALKPHOS 125 111   AST 25 18   ALT <5* <5*   ANIONGAP 11 9   EGFRNONAA 14.2* 22.4*       Significant Imaging: I have reviewed all pertinent imaging results/findings within the past 24 hours.     CT CAP:   Post surgical changes prior hepatic transplant.  Percutaneous surgical drain with notable decrease in size of fluid collection in lower abdomen.  Small amount of fluid tracking elsewhere within the abdomen and pelvis.  Large volume right pleural fluid, increased in size since prior exam, with associated right lower lobe atelectasis.  Diffuse body wall edema.  Small pericardial  effusion.    Microbiology:  1/11 blood cx: negative  1/11 pleural fluid cx: negative  1/11 ascites cx: negative  1/17 blood cx: negative  1/18 blood cx: negative  1/19 pleural fluid cx: NGTD  1/19 ascites cx: NGTD  2/1 RVP negative  2/7 OR cx: NGTD  2/11 blood cx: negative  2/15 blood cx: negative  2/16 pleural fluid cx: negative

## 2018-03-19 NOTE — ASSESSMENT & PLAN NOTE
- HD resumed on previous admission. Now anuric and dialyzes M-W-F.  - Nephrology following.    - Goal is for kidney txp in future- currently not a candidate due to health status.  - urine output has greatly improved over last several days, >900ml uop daily.    - discussed with nephrology about possibly skipping HD day to monitor renal function

## 2018-03-19 NOTE — ASSESSMENT & PLAN NOTE
- CXR in ER with large right pleural effusion with subjective c/o worsening SOB.  - Multiple Thora's this admission, all non-infectious: 1/11, 1/19, 1/31, 2/16  - CT A/P 2/26 with large R pleural effusion, may need repeat thora if fevers reoccur.    - complaining of sob, obtain chest xray, trial dose of diamox 3/14 with minimal UOP  - chest xray with large pleural effusion 3/13 --> may need thora soon if becomes symptomatic or develops fever  - Fluid removal with HD   No

## 2018-03-19 NOTE — PROGRESS NOTES
[facility]  Liver Transplant  Progress Note    Patient Name: Jhonny Diana  MRN: [id number]  Admission Date: 2018  Hospital Length of Stay: 67 days  Code Status: Full Code  Primary Care Provider: Primary Doctor No  Post-Operative Day: 151    ORGAN:   LIVER  Disease Etiology: Acute Alcoholic Hepatitis  Donor Type:    - Brain Death  CDC High Risk:   No  Donor CMV Status:   Donor CMV Status: Positive  Donor HBcAB:   Negative  Donor HCV Status:   Negative  Whole or Partial: Whole Liver  Biliary Anastomosis: End to End  Arterial Anatomy: Standard  Subjective:     History of Present Illness:  Jhonny Diana is a 28 y/o male with past medical history of alcoholic cirrhosis.  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 Found to have peritonitis in November (WBC 5000, 75% PNM) neg for bile leak. He was treated initially with vanc/cefepime. Repeat cell counts  with some improvement (WBC 1400, 45% PNM). He has undergone multiple paracenteses as well as abscess drainage of perihepatic fluid collections and treated with antimicrobial therapy but no positive cultures. Of note, biliary stricture also identified and ERCP performed on 2017 with sphincterotomy and biliary stent placed. Liver tests still have not normalized despite intervention, bilirubin and AP remain elevated. Other pertinent PMH current wound vac in place 2/2 wound infection, malnutrition requiring TPN for short course and ongoing hypoalbuminemia, and seizure activity while on prograf and has since been switched to cyclosporine without reoccurrence.  He presented to the ER for fever, abdominal pain, and N/V. He reports fever (103) for 1  day prior. Overnight, he developed N/V, reports small amount of green emesis with new left sided pain. He also endorses worsening SOB with exertion. He was scheduled as an outpatient for follow up paracentesis and IR drainage of fluid collection. CXR in ER shows large pleural effusion with subsegmental atelectasis. Infectious work up initiated in ER. His ANC is 900. Broad spectrum antibiotics initiated in ED. At admit, he denied chest pain, palpitations, diarrhea, constipation, back pain, or any sick contacts.    Hospital Course:  Thoracentesis (1.2L off), Paracentesis (1.6L off), and IR drainage of fluid collection 1/11, all fluids negative for infection. ID consulted. Broad spectrum antibiotics d/c'd 1/15. Chronically malnourished with poor PO intake, prealbumin 7. Shane tube placed 1/15 for tube feedings.     ERCP 1/17 with sludge and a biliary stone which was removed and stent exchanged.  Remained with n/v; therefore, TF remained on hold and TPN continued.   Pt with fever s/p ERCP on 1/17 which continued until 1/19.  Vanc/cefepime restarted.  Blood cx 1/17 and 1/18 NGTD.  ID reconsulted.  Fungal markers sent.  CT C/A/P obtained.  With large R pleural effusion and ascites- both drained 1/19 and negative for infx per cell count. Pt afebrile 1/20. Liver biopsy 1/23 - without rejection.  EGD 1/25 - unremarkable for source of GI symptoms.  Resume diet along with other GI recommendations. SHANE tube placed for tube feeds.       2/22: Pt again spiked temp of 101 overnight, now down to 99. Resent Bcx overnight, prelim NGTD. Transitioned to Erta from vanc/zosyn per ID. Planning for tagged WBC scan on Monday. R sided abdominal erythema/dependent edema improving today.  TF have been on hold since 2/20. Will continue to hold today per pt request. Pt working on diet. KUB yesterday with significant stool and gas. Declines colace, miralax, metamucil, but willing to take daily bisacodyl. Pt agreeable to suppository today. If  suppository not successful can give mag citrate through dylan tonight. Will also retrial Marinol tonight as pt reported improvement in appetite previously. Will monitor closely for sedation.     2/23:  2 small BM 2/22.  Tolerated HD.  [place] removed.  Will attempt to eat this PM.      2/24:  Pt ate then vomited hamburger 2/23.  Will resume TPN.  D/C atovaquone, transition cyclo to Prograf, check Keppra level, increase Reglan to 0 mg, change twyla to BID for nausea.  Will give pentam today.    2/25:  TPN going well, pt ate 1/2 subway sandwhich without vomiting yesterday, will continue to monitor.  Plan to repeat EGD this week.  Pt with drop in H/h again and appears to be hemolyzing.  LDH elevated, hapto slightly low, LEE pending.  With neutropenia- Neupogen given.    2/26:  Pt with severe back pain s/p HD. Unclear if secondary to neupogen.  Repeat labs stable. Indium scan scheduled for 2/27- injection done today (back pain started prior). CT A/P obtained with fluid collection seen in pelvis.    2/27: Patient spiking fevers, 101. Indium scan, negative results. CT A/P obtained 2/26 with focal/loculated region of fluid in the lower right abdomen measuring approximately 8.7 x 5.5 cm. IR attempted drainage, however, per IR,there was  limited fluid interposed with bowel in the RLQ with no safe pocket seen for drainage.    2/28: Remains with low grade fevers. Neupogen given.     3/1: Patient with stuttering speech, trouble recalling specific words when speaking. Does have hx of seizure (on keppra) previously while on prograf. Switched to prograf 2/24 from cyclosporine. Neuro exam unremarkable. CT head without contrast obtained, unremarkable. In light of these symptoms, prograf d/c, cyclosporine restarted 3/1. Per transplant attending, held off on EGD at this time as concern anesthesia would put patient at further increase risk for seizure activity.     3/4: pt walked in room yesterday.  Had 1 BM.  No appetite still.  Cont TPN.   He reports swelling and back pain today.  He vomited after taking Oxy PO.  Fentanyl 12.5mg IV ordered.  Patient rested well afterward.  Remains afebrile.   ID following.   Cont Ertapenem.  Repeat blood and urine cx given continued malaise and stutter- blood cx prelim NGTD.  UA ok.  Urine cx pending.    Tentative plan for EGD on Monday to assess source of fevers/nausea/abdominal pain w bx for CMV and GVHD.  Will also reassess rather able to perform paracentesis.     3/5: no acute events overnight. Pt still not feeling well and with poor sleep overnight. Cont to have mumbling of speech at times but slightly better than before. Remains with abdominal pain but relieved with PO pain meds. Tolerated HD well today. Will plan for paracentesis and EGD tomorrow to assess for peritonitis and cause of persistent N/V respectively.     3/6: pt continues to have garbled/mumbled speech. EGD performed today and pt received sedation during procedure. Increased lethargy and dysarthria noted post procedure. VS stable. Paracentesis scheduled today as well to r/o peritonitis. Wbc count stable. Afebrile. Continue Ertapenem. HD yesterday and pt tolerated well, 2.5 L removed. Monitor.     3/7: MRI brain yesterday unremarkable. Blood cultures ngtd so far. Neurology consulted. EGD and Paracentesis yesterday. Para with 25 cc removed, wbc 28 segs 3%. Continue TPN. Monitor.    3/8: EUS and ERCP scheduled tomorrow. NPO after midnight. T bili continues trending up daily. Last ERCP on 2/19 with two stents placed. Ertapenem dc'd 3/7. Pt spiking temps today, tmax 100.9. Cefepime/vanc started. Oxycodone decreased to 5 mg q6h prn. Dc marinol and reglan for now. He continues to slur/stutter and have jerky movements throughout the day. Neurology following. EEG scheduled today. Monitor.     3/9: EUS/ERCP completed with choledoliathiasis.  Fever over night.  May need LP in future if mental status does not improve.      3/10- speech improved, neuro  suggesting lumbar puncture - schedule for Monday    3/11 - more lethargic, tmax 100.9, plan for lumbar puncture in am.    3/12 - mental status greatly improved, walking halls with PT, will hold off on LP for now.    3/13 - mental status remains stable, complains of pain to lower abdomen, bowel movement with suppository over night, KUB with non specific bowel gas pattern, would like to increase pain regimen - spoke with patient and mother this was counter intuitive as narcotics slow down the bowels, patient states understanding - will take colace religiously with increased pain medicine.  Also complaining of sob - chest xray and dose of diamox    3/14: Mentation good.  Continue cyclosporine.   Tolerated HD.  Decrease hydrocortisone dose once pt has cyclosporine level.  Transjug bx shows mild cholestatic hepatitis --> unable to cx since bx performed via EUS.  Will plan to obtain culture with next bx.    3/15: Pt feeling well.  Still reports abd pain which is slightly improved.  He feels eating is going well- calorie count initiated --> goal to decreased then D/c TPN.  Restart of TF discussed with pt and mom but will allow for a 48 hour calorie count.  Hydrocortisone transitioned back to prednisone taper.  Pt with 7 bands --> will continue to monitor closely for infection.    3/16: More fatigued today, feels sleepy after HD and walking in halls with PT. Very tearful, patient denied wanting to talk with someone about his mood and emotions and declined anti-depressive medications at this time. Still only eating 25% of 2/3 meals daily with minimal calories. Continue TPN/Lipids for now. Not interested in drinking boost supplements or tube feeding. Bands continue to be elevated at 7, continue to monitor.    3/17-3/18: continues to c/o fatigue. Reports poor appetite 2/2 feeling full and N/V. Continue with TPN. Afebrile. UOP 990ml/24 hours, will give albumin x 3 doses today.     3/19: HD this AM. UOP > 900ml/24 hours, Cr/BUN  still rising. Still not eating, no appetite 2/2 nausea, even talking about food causes nausea. Restart marinol and reglan.    Scheduled Meds:   bisacodyl  10 mg Oral Daily    ceFEPime (MAXIPIME) IVPB  1 g Intravenous Q12H    cycloSPORINE modified  100 mg Oral BID    docusate sodium  100 mg Oral BID    epoetin maurisio (PROCRIT) injection  10,000 Units Intravenous Every Mon, Wed, Fri    ergocalciferol  50,000 Units Oral Q7 Days    fat emulsion 20%  250 mL Intravenous Once    heparin (porcine)  5,000 Units Subcutaneous Q8H    custom IVPB builder   Intravenous Q24H    levetiracetam oral soln  500 mg Oral BID    levothyroxine  75 mcg Oral Before breakfast    metronidazole  500 mg Intravenous Q8H    multivitamin  1 tablet Oral Daily    omeprazole  40 mg Oral QAM    polyethylene glycol  17 g Oral BID    predniSONE  20 mg Oral Daily    psyllium husk (aspartame)  3.4 g Oral BID    ursodiol  300 mg Oral BID     Continuous Infusions:   TPN ADULT CENTRAL LINE CUSTOM 48 mL/hr at 03/18/18 2125    TPN ADULT CENTRAL LINE CUSTOM       PRN Meds:sodium chloride 0.9%, acetaminophen, albuterol-ipratropium 2.5mg-0.5mg/3mL, bisacodyl, dextrose 50%, dextrose 50%, diphenhydrAMINE-zinc acetate 1-0.1%, glucagon (human recombinant), glucose, glucose, heparin (porcine), naloxone, ondansetron, ondansetron, oxyCODONE, oxyCODONE, prochlorperazine, simethicone, sodium chloride 0.9%, sodium chloride 0.9%    Review of Systems   Constitutional: Positive for activity change, appetite change and fatigue. Negative for fever.   HENT: Negative.  Negative for facial swelling.    Eyes: Negative.    Respiratory: Negative.  Negative for apnea, chest tightness, shortness of breath and wheezing.    Cardiovascular: Negative.  Negative for chest pain, palpitations and leg swelling.   Gastrointestinal: Positive for abdominal distention, abdominal pain and constipation. Negative for diarrhea, nausea and vomiting.   Genitourinary: Positive for  decreased urine volume. Negative for difficulty urinating, dysuria, hematuria and urgency.   Musculoskeletal: Negative.  Negative for back pain, gait problem, neck pain and neck stiffness.   Skin: Negative.  Negative for color change and pallor.   Allergic/Immunologic: Positive for immunocompromised state.   Neurological: Negative.  Negative for dizziness, seizures, weakness, light-headedness and headaches.   Psychiatric/Behavioral: Positive for dysphoric mood. Negative for behavioral problems, confusion, hallucinations, sleep disturbance and suicidal ideas. The patient is not nervous/anxious.         Tearful     Objective:     Vital Signs (Most Recent):  Temp: 98.4 °F (36.9 °C) (03/19/18 1231)  Pulse: 70 (03/19/18 1231)  Resp: 18 (03/19/18 1231)  BP: (!) 144/80 (03/19/18 1231)  SpO2: 98 % (03/19/18 1231) Vital Signs (24h Range):  Temp:  [97.7 °F (36.5 °C)-99.3 °F (37.4 °C)] 98.4 °F (36.9 °C)  Pulse:  [63-89] 70  Resp:  [15-18] 18  SpO2:  [94 %-98 %] 98 %  BP: (142-165)/(80-99) 144/80     Weight: 74.8 kg (164 lb 14.5 oz)  Body mass index is 26.62 kg/m².    Intake/Output - Last 3 Shifts       03/17 0700 - 03/18 0659 03/18 0700 - 03/19 0659 03/19 0700 - 03/20 0659    P.O. 360 315     Other       IV Piggyback 300 100     TPN 1171.7 1151.2 700    Total Intake(mL/kg) 1831.7 (24.5) 1566.2 (20.9) 700 (9.4)    Urine (mL/kg/hr) 990 (0.6) 915 (0.5) 375 (0.6)    Emesis/NG output 0 (0) 0 (0)     Other 0 (0) 0 (0)     Stool 0 (0) 0 (0) 0 (0)    Blood 0 (0) 0 (0)     Total Output 990 915 375    Net +841.7 +651.2 +325           Urine Occurrence 0 x 0 x 0 x    Stool Occurrence 0 x 0 x 0 x    Emesis Occurrence 0 x 0 x           Physical Exam   Constitutional: He is oriented to person, place, and time. He appears well-developed.   Hand and temporal muscle wasting   HENT:   Head: Normocephalic.   Eyes: Pupils are equal, round, and reactive to light. No scleral icterus.   Cardiovascular: Normal rate, regular rhythm, normal heart sounds  and intact distal pulses.    Pulmonary/Chest: Effort normal. No respiratory distress. He has decreased breath sounds in the right lower field and the left lower field. He has no wheezes. He has no rhonchi. He has no rales.   Abdominal: Soft. Bowel sounds are normal. He exhibits distension. There is no tenderness.   Pain with palpation   Musculoskeletal: Normal range of motion. He exhibits edema (generalized).   Neurological: He is alert and oriented to person, place, and time.   Skin: Skin is warm and dry.   Psychiatric: He has a normal mood and affect. His behavior is normal. Judgment and thought content normal.   Nursing note and vitals reviewed.      Laboratory:  Immunosuppressants         Stop Route Frequency     cycloSPORINE modified (NEORAL) capsule 100 mg      -- Oral 2 times daily     cycloSPORINE modified (NEORAL) 25 MG capsule      03/09 1714       cycloSPORINE modified (NEORAL) 100 MG capsule      03/09 1714       cycloSPORINE modified (NEORAL) 100 MG capsule      03/09 0514       cycloSPORINE modified (NEORAL) 25 MG capsule      03/09 0514          CBC:   Recent Labs  Lab 03/19/18  0500   WBC 5.96   RBC 2.52*   HGB 8.2*   HCT 25.1*   PLT 98*   *   MCH 32.5*   MCHC 32.7     CMP:   Recent Labs  Lab 03/19/18  0500   GLU 87   CALCIUM 9.3   ALBUMIN 2.8*   PROT 6.2      K 3.7   CO2 22*      BUN 71*   CREATININE 3.8*   ALKPHOS 95   ALT 7*   AST 26   BILITOT 1.5*     Coagulation: No results for input(s): PT, INR, APTT in the last 168 hours.  Labs within the past 24 hours have been reviewed.    Diagnostic Results:  I have personally reviewed all pertinent imaging studies.    Assessment/Plan:     Fever    - Started on vanc/cefepime for ERCP then transitioned to vanc/zosyn.  - thoracentesis 1/11, 1/18, 1/31, and 2/16--all without infection, cultures NGTD  - multiple blood cultures sent over admission, all NGTD  - now cmv positive - started treatment 2/5/18. CMV PCR 2/15 negative  - taken to OR  2/7 for exp lap - fluid collections drained, cultures NGTD.  - Bilirubin remains elevated. Concerning for cholangitis. Antibiotics broadened 2/17. ID re consulted.   - ERCP  2/19, overall unremarkable findings. Did place new larger stents.   - Tmax 101 2/21 PM again ID following. Plan to transition vanc/zosyn to ertapenem 2/22 as no improvement noted.   - CT A/P obtained 2/26 with focal/loculated region of fluid in the lower right abdomen  - IR attempted drainage on fluid collection on 2/27, however, per IR,there was limited fluid interposed with bowel in the RLQ with no safe pocket seen for drainage.  - Tagged WBC scan 2/27, negative results.  - Remained with intermittent fevers without clear infection source.   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 EBV PCR negative. Adenovirus negative. Repeat CMV PCR negative.    - ID following. Cont Ertapenem dc'd 3/7 - due to AMS --> Tmax 101.1 overnight 3/9 - Cefepime/vanc/flagyl.  Monitor.   - Remains afebrile since 3/10 following ERCP 3/9 with metal stent placement  - Bx with cholestatic hepatitis --> couldn't cx secondary to EUS contamination --> will cx next bx  - Plan to continue abx (flagyl/cefepime) x 14 days to treat cholangitis --> discussed with ID, plan to dc 1 antibiotic over 3 day period: d/c flagyl 3/19, d/c cefepime 3/20, and d/c isavu 3/21  - Bandemia noted on labs 3/14 was 6 then 3/15 and 3/16 was 7.0, now trending down. Continue to monitor.        Severe protein-calorie malnutrition    - Poor PO intake since transplant requiring short course of TPN during previous hospital stay.   - Dietary consulted. Will need to closely monitor PO intake.   - supplements also ordered, refusing to drink  - SHANE with tube feeding start 1/16. Stopped 1/17/18, was not tolerating. [place] removed 1/20.  - prealbumin 7 on 1/15 --> prealbumin 22 on 3/19, monitoring weekly  - TPN started 1/16/18.   - GI consulted - EGD reviewed, increased ppi bid, miralax bid, and  metamucil bid, decreased narcotics.  - Poorly tolerated TF, had worsening nausea. Not tolerating well due to nausea.   - small bowel follow through with normal findings.   - consulted gi for possible gj tube placement, holding off on this for now as with ongoing fevers.  - retrial marinol 2/22 as pt reports that's the only thing helping his appetite, in the evening 2/2 concern for possible drowsiness --> marinol d/c due to neuro symptoms and sedation  - Pt then developed persistent N/V again and TPN started 2/24. Cont TPN for now.   - EGD 3/6 with normal esophagus    - congested, erythematous and nodular mucosa in the stomach.    - biliary stent in the duodenum    - one duodenal ulcer with a clean ulcer base (Emir Class III).    - biopsies taken in the duodenal bulb and second and third portion of the duodenum.  - Hold Reglan and Marinol 3/8 for neuro symptoms --> restarted reglan and marinol 3/19  - Continue TPN, tolerating diet, but has minimal appetite, eating 25% 2/3 meals, no appetite 2/2 feeling full/N/V.  - Calorie count continued, patient refusing shane tube        Biliary stricture of transplanted liver    - ERCP 12/6 with post-anastomosis stricture with stent placement.  - Tbili with increase prompting ERCP 1/30 with stones and sludge, stents placed.  - Repeat ERCP 2/19 as was having recurrent fevers with bilirubin stuck at 2.0-2.2 with overall unremarkable findings seen. Did place new larger stents.   - US 3/2 stent in CBD  - ERCP/EUS 3/9 with mental stent placed in bile duct, stones removed  - Tbili remains stable. Continue actigall.        Nausea and vomiting    - h/o constipation, reports having regular BM's with bowel regimen.  - vomiting episode x 1 at home. Anti-emetics ordered PRN.  - KUB repeated 1/14/18 given increased abdominal pain- mild, generalized bowel distention suggesting ileus.  Diet changed to clears as tolerated for bowel rest .  - SHANE tube intermittent re-placed several times this  admission but it was poorly tolerated due to persistent N/V--last SHANE tube d/c'd 2/23  - chronic constipation, Cont bowel regimen.   - 2/24 changes: Increased Reglan to 10 mg, D/C atovaquone, transitioned cyclo to Prograf, change twyla to BID, check Keppra level  - GI consulted. EGD 3/6 as with continued nausea/vomiting with po intake. Appreciate GI recs.    - Cont to have nausea and vomiting w medications and eating.  Cont TPN.     - reglan and marinol held 3/8 due to neurologic symptoms--> restarted 3/19  - If cont with GI symptoms then may need to consider Flex sig/ C-scope to further eval.         Recurrent pleural effusion on right    - CXR in ER with large right pleural effusion with subjective c/o worsening SOB.  - Multiple Thora's this admission, all non-infectious: 1/11, 1/19, 1/31, 2/16  - CT A/P 2/26 with large R pleural effusion, may need repeat thora if fevers reoccur.    - complaining of sob, obtain chest xray, trial dose of diamox 3/14 with minimal UOP  - chest xray with large pleural effusion 3/13 --> may need thora soon if becomes symptomatic or develops fever  - Fluid removal with HD        Liver transplanted    - Post op course complicated by fevers and hyperbilirubinemia, AST/ALT stable.  - see biliary stricture of transplanted liver  - Liver US 1/8 showed 3.9 cm complex fluid collection anterior to right lobe and moderate nonspecific complex ascites inferior to transplant.   - IR placed drain 1/11/18, cell count negative for infection.  - Liver biopsy 1/23 without rejection.  - Paracentesis 3/6 negative for infection, wbc 28 segs 3%.  - ERCP, EUS biopsy 3/9/18 - no rejection, mild cholestatic hepatitis seen.         Vitamin D deficiency    - Continue ergo 50K weekly.         Kidney transplant candidate              Other cytomegaloviral diseases    - detected at 370 on CMV PCR 2/1.  - case discussed with ID and will hold off on treatment at this time given low WBC.   - undetected CMV PCR 2/8,  2/15, 2/22, 3/8   - continue Valcyte 200 mg every Mon, Wed, Fri.  - Per ID, valcyte dose decreased to ppx dose on 2/18--Valcyte d/c'd 2/25  - Repeat scope 3/6 to assess persistent N/V and possible CMV causing these symptoms. Biopsies thus far normal, cmv stains negative.  - continue to monitor weekly CMV PCR's, last PCR neg from 3/15.        Constipation    - chronic  - encourage ambulation, stool softeners and laxatives, often refuses some meds 2/2 nausea, will often require suppository or brown bomb enema to have a BM  - Small bowel follow through with normal findings        Anemia of chronic disease    - 1 U PRBC 3/12  - Monitor daily labs. H/H stable, transfuse PRN.         Delayed surgical wound healing    - Wd vac removed 1/12/18.    - Wound healing well.   - Changing dressing q Tuesday, last 3/13          Seizure    - 3/1, patient with stuttering speech, trouble recalling specific words when speaking. Does have hx of seizure (on keppra) previously while on prograf. Switched to prograf over the weekend from cyclosporine. Neuro exam unremarkable.   - CT head without contrast obtained, unremarkable.   - In light of these symptoms, prograf d/c, restart cyclosporine 3/1.  - remains w/o seizure. Cont to have stutter/garbled speech/difficulty finding words.  Worse post EGD as sedation was given.  - MRI 3/6 unremarkable.  - EEG completed 3/8 - mild, generalized, non-specific cerebral dysfunction, no epileptiform activity  - monitor closely. VSS.        At risk for opportunistic infections    - see other cytomegaloviral disease  - Pentam given 2/24.  - continue isavu for complicated post-op course        Long-term use of immunosuppressant medication    - Maintenance IS with cyclosporine --> transitioned to Prograf 2/24 for continued nausea but did not tolerate from neuro standpoint.  PT IS NOT A CANDIDATE FOR TACROLIMUS.    - Transitioned back to cyclosporine 3/1  - D/c cyclosporine, started rapa & stress dose  steroids 3/10 for continued confusion  - Mental status now improved, retrial cyclosporine 3/13, d/c rapa 3/13  - Transition back to prednisone taper from stress dose steroids 3/15        Prophylactic immunotherapy    - See long term use of immunosuppression.         Acute renal failure with tubular necrosis    - HD resumed on previous admission. Now anuric and dialyzes M-W-F.  - Nephrology following.    - Goal is for kidney txp in future- currently not a candidate due to health status.  - urine output has greatly improved over last several days, >900ml uop daily.    - discussed with nephrology about possibly skipping HD day to monitor renal function            VTE Risk Mitigation         Ordered     heparin (porcine) injection 1,000 Units  As needed (PRN)     Route:  Intra-Catheter        03/12/18 1134     heparin (porcine) injection 5,000 Units  Every 8 hours     Route:  Subcutaneous        03/08/18 1622     Medium Risk of VTE  Once      01/11/18 0351     Place sequential compression device  Until discontinued      01/11/18 0351          The patients clinical status was discussed at multidisplinary rounds, involving transplant surgery, transplant medicine, pharmacy, nursing, nutrition, and social work    Discharge Planning: not stable for discharge at this time. Discharge pending improvement in nutrition status.  Monitor kidney function --> may need outpt HD  Monitor HH needs vs rehab for deconditioned status      Shereen Mcguire, MATILDE  Liver Transplant  [facility]

## 2018-03-19 NOTE — PT/OT/SLP PROGRESS
Occupational Therapy      Patient Name:  Jhonny Diana   MRN:  [id number]  [id number], [id number]  Patient not seen today secondary to Dialysis, Patient fatigue. Will follow-up as schedule allows.    Zulma Renteria OT  3/19/2018

## 2018-03-19 NOTE — ASSESSMENT & PLAN NOTE
- h/o constipation, reports having regular BM's with bowel regimen.  - vomiting episode x 1 at home. Anti-emetics ordered PRN.  - KUB repeated 1/14/18 given increased abdominal pain- mild, generalized bowel distention suggesting ileus.  Diet changed to clears as tolerated for bowel rest .  - SHANE tube intermittent re-placed several times this admission but it was poorly tolerated due to persistent N/V--last SHANE tube d/c'd 2/23  - chronic constipation, Cont bowel regimen.   - 2/24 changes: Increased Reglan to 10 mg, D/C atovaquone, transitioned cyclo to Prograf, change twyla to BID, check Keppra level  - GI consulted. EGD 3/6 as with continued nausea/vomiting with po intake. Appreciate GI recs.    - Cont to have nausea and vomiting w medications and eating.  Cont TPN.     - reglan and marinol held 3/8 due to neurologic symptoms--> restarted 3/19  - If cont with GI symptoms then may need to consider Flex sig/ C-scope to further eval.

## 2018-03-19 NOTE — ASSESSMENT & PLAN NOTE
- Started on vanc/cefepime for ERCP then transitioned to vanc/zosyn.  - thoracentesis 1/11, 1/18, 1/31, and 2/16--all without infection, cultures NGTD  - multiple blood cultures sent over admission, all NGTD  - now cmv positive - started treatment 2/5/18. CMV PCR 2/15 negative  - taken to OR 2/7 for exp lap - fluid collections drained, cultures NGTD.  - Bilirubin remains elevated. Concerning for cholangitis. Antibiotics broadened 2/17. ID re consulted.   - ERCP  2/19, overall unremarkable findings. Did place new larger stents.   - Tmax 101 2/21 PM again ID following. Plan to transition vanc/zosyn to ertapenem 2/22 as no improvement noted.   - CT A/P obtained 2/26 with focal/loculated region of fluid in the lower right abdomen  - IR attempted drainage on fluid collection on 2/27, however, per IR,there was limited fluid interposed with bowel in the RLQ with no safe pocket seen for drainage.  - Tagged WBC scan 2/27, negative results.  - Remained with intermittent fevers without clear infection source.   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 EBV PCR negative. Adenovirus negative. Repeat CMV PCR negative.    - ID following. Cont Ertapenem dc'd 3/7 - due to AMS --> Tmax 101.1 overnight 3/9 - Cefepime/vanc/flagyl.  Monitor.   - Remains afebrile since 3/10 following ERCP 3/9 with metal stent placement  - Bx with cholestatic hepatitis --> couldn't cx secondary to EUS contamination --> will cx next bx  - Plan to continue abx (flagyl/cefepime) x 14 days to treat cholangitis --> discussed with ID, plan to dc 1 antibiotic over 3 day period: d/c flagyl 3/19, d/c cefepime 3/20, and d/c isavu 3/21  - Bandemia noted on labs 3/14 was 6 then 3/15 and 3/16 was 7.0, now trending down. Continue to monitor.

## 2018-03-19 NOTE — PROGRESS NOTES
[facility] NOTE     Patient currently on hemodialysis for removal of uremic toxins  And UF 3 L.     Patient seen and evaluated on hemodialysis, tolerating treatment, see HD flowsheet for vitals and assessments.      No Hypotension, chest pain, shortness of breath, cramping, nausea or vomiting. BLE +1 edema    UOP increase last 2 days >900 ml each day.     Kay Guillen, NP  Nephrology          I have reviewed and concur with the NP's history, physical, assessment, and plan. I have personally interviewed and examined the patient at bedside.

## 2018-03-19 NOTE — ASSESSMENT & PLAN NOTE
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  - Tbili with increase prompting ERCP 1/30 with stones and sludge, stents placed.  - Repeat ERCP 2/19 as was having recurrent fevers with bilirubin stuck at 2.0-2.2 with overall unremarkable findings seen. Did place new larger stents.   - US 3/2 stent in CBD  - ERCP/EUS 3/9 with mental stent placed in bile duct, stones removed  - Tbili remains stable. Continue actigall.

## 2018-03-19 NOTE — PLAN OF CARE
Problem: Patient Care Overview  Goal: Plan of Care Review  Outcome: Ongoing (interventions implemented as appropriate)  HD treatment complete. Duration of treatment 3.5 hours and 3L removed. Treatment was tolerated well and no complications with access to right chest wall catheter. Catheter flushed with NS and locked with heparin. Capped and taped.

## 2018-03-20 LAB
ALBUMIN SERPL BCP-MCNC: 2.4 G/DL
ALP SERPL-CCNC: 93 U/L
ALT SERPL W/O P-5'-P-CCNC: 6 U/L
ANION GAP SERPL CALC-SCNC: 11 MMOL/L
ANISOCYTOSIS BLD QL SMEAR: SLIGHT
AST SERPL-CCNC: 30 U/L
BASOPHILS # BLD AUTO: ABNORMAL K/UL
BASOPHILS NFR BLD: 0 %
BILIRUB SERPL-MCNC: 1.3 MG/DL
BUN SERPL-MCNC: 39 MG/DL
CALCIUM SERPL-MCNC: 8.4 MG/DL
CHLORIDE SERPL-SCNC: 106 MMOL/L
CO2 SERPL-SCNC: 23 MMOL/L
CREAT SERPL-MCNC: 2.5 MG/DL
CYCLOSPORINE BLD LC/MS/MS-MCNC: 191 NG/ML
DIFFERENTIAL METHOD: ABNORMAL
EOSINOPHIL # BLD AUTO: ABNORMAL K/UL
EOSINOPHIL NFR BLD: 1 %
ERYTHROCYTE [DISTWIDTH] IN BLOOD BY AUTOMATED COUNT: 19.2 %
EST. GFR  (AFRICAN AMERICAN): 38.9 ML/MIN/1.73 M^2
EST. GFR  (NON AFRICAN AMERICAN): 33.7 ML/MIN/1.73 M^2
GLUCOSE SERPL-MCNC: 89 MG/DL
HCT VFR BLD AUTO: 24.1 %
HGB BLD-MCNC: 7.8 G/DL
HYPOCHROMIA BLD QL SMEAR: ABNORMAL
IMM GRANULOCYTES # BLD AUTO: ABNORMAL K/UL
IMM GRANULOCYTES NFR BLD AUTO: ABNORMAL %
LYMPHOCYTES # BLD AUTO: ABNORMAL K/UL
LYMPHOCYTES NFR BLD: 14 %
MAGNESIUM SERPL-MCNC: 1.6 MG/DL
MCH RBC QN AUTO: 31.8 PG
MCHC RBC AUTO-ENTMCNC: 32.4 G/DL
MCV RBC AUTO: 98 FL
MONOCYTES # BLD AUTO: ABNORMAL K/UL
MONOCYTES NFR BLD: 6 %
MYELOCYTES NFR BLD MANUAL: 3 %
NEUTROPHILS NFR BLD: 72 %
NEUTS BAND NFR BLD MANUAL: 4 %
NRBC BLD-RTO: 0 /100 WBC
OVALOCYTES BLD QL SMEAR: ABNORMAL
PHOSPHATE SERPL-MCNC: 3.6 MG/DL
PLATELET # BLD AUTO: 88 K/UL
PMV BLD AUTO: 12.2 FL
POIKILOCYTOSIS BLD QL SMEAR: SLIGHT
POLYCHROMASIA BLD QL SMEAR: ABNORMAL
POTASSIUM SERPL-SCNC: 3.6 MMOL/L
PROT SERPL-MCNC: 5.7 G/DL
RBC # BLD AUTO: 2.45 M/UL
SODIUM SERPL-SCNC: 140 MMOL/L
WBC # BLD AUTO: 5.85 K/UL

## 2018-03-20 PROCEDURE — B4185 PARENTERAL SOL 10 GM LIPIDS: HCPCS | Performed by: NURSE PRACTITIONER

## 2018-03-20 PROCEDURE — 80053 COMPREHEN METABOLIC PANEL: CPT

## 2018-03-20 PROCEDURE — 99233 SBSQ HOSP IP/OBS HIGH 50: CPT | Mod: ,,, | Performed by: NURSE PRACTITIONER

## 2018-03-20 PROCEDURE — 25000003 PHARM REV CODE 250: Performed by: PHYSICIAN ASSISTANT

## 2018-03-20 PROCEDURE — 85007 BL SMEAR W/DIFF WBC COUNT: CPT

## 2018-03-20 PROCEDURE — 94664 DEMO&/EVAL PT USE INHALER: CPT

## 2018-03-20 PROCEDURE — 97803 MED NUTRITION INDIV SUBSEQ: CPT | Performed by: DIETITIAN, REGISTERED

## 2018-03-20 PROCEDURE — 63600175 PHARM REV CODE 636 W HCPCS: Performed by: NURSE PRACTITIONER

## 2018-03-20 PROCEDURE — P9047 ALBUMIN (HUMAN), 25%, 50ML: HCPCS | Mod: JG | Performed by: NURSE PRACTITIONER

## 2018-03-20 PROCEDURE — A4217 STERILE WATER/SALINE, 500 ML: HCPCS | Performed by: NURSE PRACTITIONER

## 2018-03-20 PROCEDURE — 80158 DRUG ASSAY CYCLOSPORINE: CPT

## 2018-03-20 PROCEDURE — 84100 ASSAY OF PHOSPHORUS: CPT

## 2018-03-20 PROCEDURE — 63600175 PHARM REV CODE 636 W HCPCS: Performed by: PHYSICIAN ASSISTANT

## 2018-03-20 PROCEDURE — 25000003 PHARM REV CODE 250: Performed by: NURSE PRACTITIONER

## 2018-03-20 PROCEDURE — 20600001 HC STEP DOWN PRIVATE ROOM

## 2018-03-20 PROCEDURE — 99900035 HC TECH TIME PER 15 MIN (STAT)

## 2018-03-20 PROCEDURE — 99232 SBSQ HOSP IP/OBS MODERATE 35: CPT | Mod: ,,, | Performed by: INTERNAL MEDICINE

## 2018-03-20 PROCEDURE — 25000003 PHARM REV CODE 250: Performed by: SURGERY

## 2018-03-20 PROCEDURE — 63600175 PHARM REV CODE 636 W HCPCS: Mod: JG | Performed by: SURGERY

## 2018-03-20 PROCEDURE — 83735 ASSAY OF MAGNESIUM: CPT

## 2018-03-20 PROCEDURE — 85027 COMPLETE CBC AUTOMATED: CPT

## 2018-03-20 RX ORDER — FUROSEMIDE 10 MG/ML
80 INJECTION INTRAMUSCULAR; INTRAVENOUS ONCE
Status: COMPLETED | OUTPATIENT
Start: 2018-03-20 | End: 2018-03-20

## 2018-03-20 RX ORDER — FUROSEMIDE 10 MG/ML
40 INJECTION INTRAMUSCULAR; INTRAVENOUS ONCE
Status: DISCONTINUED | OUTPATIENT
Start: 2018-03-20 | End: 2018-03-20

## 2018-03-20 RX ORDER — ALBUMIN HUMAN 250 G/1000ML
25 SOLUTION INTRAVENOUS ONCE
Status: COMPLETED | OUTPATIENT
Start: 2018-03-20 | End: 2018-03-20

## 2018-03-20 RX ADMIN — OXYCODONE HYDROCHLORIDE 5 MG: 5 TABLET ORAL at 03:03

## 2018-03-20 RX ADMIN — OMEPRAZOLE 40 MG: 40 CAPSULE, DELAYED RELEASE ORAL at 07:03

## 2018-03-20 RX ADMIN — METOCLOPRAMIDE HYDROCHLORIDE 10 MG: 5 SOLUTION ORAL at 05:03

## 2018-03-20 RX ADMIN — OXYCODONE HYDROCHLORIDE 5 MG: 5 TABLET ORAL at 12:03

## 2018-03-20 RX ADMIN — URSODIOL 300 MG: 300 CAPSULE ORAL at 08:03

## 2018-03-20 RX ADMIN — SODIUM CHLORIDE: 9 INJECTION, SOLUTION INTRAVENOUS at 01:03

## 2018-03-20 RX ADMIN — LEVETIRACETAM 500 MG: 100 SOLUTION ORAL at 09:03

## 2018-03-20 RX ADMIN — ONDANSETRON HYDROCHLORIDE 4 MG: 2 INJECTION, SOLUTION INTRAMUSCULAR; INTRAVENOUS at 09:03

## 2018-03-20 RX ADMIN — ALBUMIN HUMAN 25 G: 0.25 SOLUTION INTRAVENOUS at 11:03

## 2018-03-20 RX ADMIN — CYCLOSPORINE 125 MG: 100 CAPSULE, LIQUID FILLED ORAL at 08:03

## 2018-03-20 RX ADMIN — OXYCODONE HYDROCHLORIDE 5 MG: 5 TABLET ORAL at 09:03

## 2018-03-20 RX ADMIN — CYCLOSPORINE 125 MG: 100 CAPSULE, LIQUID FILLED ORAL at 05:03

## 2018-03-20 RX ADMIN — ALBUMIN HUMAN 25 G: 0.25 SOLUTION INTRAVENOUS at 05:03

## 2018-03-20 RX ADMIN — DOCUSATE SODIUM 100 MG: 100 CAPSULE, LIQUID FILLED ORAL at 08:03

## 2018-03-20 RX ADMIN — METOCLOPRAMIDE HYDROCHLORIDE 10 MG: 5 SOLUTION ORAL at 11:03

## 2018-03-20 RX ADMIN — BISACODYL 10 MG: 5 TABLET, COATED ORAL at 08:03

## 2018-03-20 RX ADMIN — MAGNESIUM SULFATE HEPTAHYDRATE: 500 INJECTION, SOLUTION INTRAMUSCULAR; INTRAVENOUS at 09:03

## 2018-03-20 RX ADMIN — FUROSEMIDE 80 MG: 10 INJECTION, SOLUTION INTRAMUSCULAR; INTRAVENOUS at 12:03

## 2018-03-20 RX ADMIN — DRONABINOL 2.5 MG: 2.5 CAPSULE ORAL at 09:03

## 2018-03-20 RX ADMIN — URSODIOL 300 MG: 300 CAPSULE ORAL at 09:03

## 2018-03-20 RX ADMIN — LEVOTHYROXINE SODIUM 75 MCG: 75 TABLET ORAL at 05:03

## 2018-03-20 RX ADMIN — OXYCODONE HYDROCHLORIDE 5 MG: 5 TABLET ORAL at 10:03

## 2018-03-20 RX ADMIN — PREDNISONE 20 MG: 10 TABLET ORAL at 08:03

## 2018-03-20 RX ADMIN — SOYBEAN OIL 250 ML: 20 INJECTION, SOLUTION INTRAVENOUS at 09:03

## 2018-03-20 RX ADMIN — LEVETIRACETAM 500 MG: 100 SOLUTION ORAL at 08:03

## 2018-03-20 RX ADMIN — CEFEPIME 1 G: 1 INJECTION, POWDER, FOR SOLUTION INTRAMUSCULAR; INTRAVENOUS at 05:03

## 2018-03-20 NOTE — ASSESSMENT & PLAN NOTE
- Started on vanc/cefepime for ERCP 1/11 then transitioned to vanc/zosyn on 1/12 due to fevers, then transitioned to ertapenem 2/22, ertapenem was d/c'd 3/7 due to AMS changes, following ERCP 3/9, he was treated with 3 week course of flagyl/cefepime, which he completed on 3/20.   - thoracentesis 1/11, 1/18, 1/31, and 2/16--all without infection, cultures NGTD  - multiple blood cultures sent over admission, all NGTD  - cmv positive 2/1--see below for CMV information  - taken to OR 2/7 for exp lap - fluid collections drained, cultures NGTD.  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  - IR attempted drainage on fluid collection on 2/27, however, per IR,there was limited fluid interposed with bowel in the RLQ with no safe pocket seen for drainage.  - Tagged WBC scan 2/27, negative results.  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 EBV PCR negative. Adenovirus negative. Repeat CMV PCR negative.    - Remains afebrile since 3/10 following ERCP 3/9 with metal stent placement  - Bx with cholestatic hepatitis --> couldn't cx secondary to EUS contamination --> will cx next bx  - Plan to continue abx (flagyl/cefepime) x 14 days to treat cholangitis --> discussed with ID, plan to dc 1 antibiotic over 3 day period: d/c flagyl 3/19, d/c cefepime 3/20, and d/c isavu 3/21  - Bandemia noted on labs 3/14 was 6 then 3/15 and 3/16 was 7.0, now trending down. Continue to monitor.

## 2018-03-20 NOTE — PLAN OF CARE
Problem: Patient Care Overview  Goal: Plan of Care Review  Outcome: Ongoing (interventions implemented as appropriate)  Pt AAOx4. VSS.  HD possibly on HOLD tomorrow.  Pt complaining of abdominal pain and cramping today w/ 3 BMs (1 loose one). Oxy 5 mg given q4h.  Little relief obtained.  NP aware.  Appetite remains minimal.  Pt not able to eat at all today.  PO intake encouraged.  Marinol and reglan still on board.  TPN running @ 48 cc/hr.  Plan to start nocturnal TPN, to be given only at night.    350 cc carlos colored urine output so far this shift.  80 mg IV lasix given following administration of albumin. Plan for 2 more doses albumin this afternoon.  Pt refusing heparin.  Activity encouraged.  Pt seen walking in room and hallway with mother.    Bed lowered and locked in place.  Call bell in reach. Personal items at bedside. Side rails up x 2.  No acute events/falls/injuries.  See flowsheet for further assessment findings.  Will monitor.  Discharge pending improvement in nutrition status.

## 2018-03-20 NOTE — ASSESSMENT & PLAN NOTE
- Maintenance IS with cyclosporine --> transitioned to Prograf 2/24 for continued nausea but did not tolerate from neuro standpoint.  PT IS NOT A CANDIDATE FOR TACROLIMUS.    - Transitioned back to cyclosporine 3/1  - D/c cyclosporine, started rapa & stress dose steroids 3/10 for continued confusion  - Mental status now improved, re-trial cyclosporine 3/13, d/c rapa 3/13  - Transition back to prednisone taper from stress dose steroids 3/15

## 2018-03-20 NOTE — ASSESSMENT & PLAN NOTE
- Poor PO intake since transplant requiring short course of TPN during previous hospital stay.   - Due to N/V and abdominal pain, multiple attempts were made to place SHANE tube with tube feeding, but patient did not tolerate TF  - Transitioned to TPN 1/16 then again on 2/24 due to poor tolerance of SHANE tube/TF  - prealbumin 7 on 1/15 --> prealbumin 22 on 3/19, monitoring weekly  - EGD performed 1/25 and 3/6, gastroparesis, duodenal ulcer, and congested/erythematous gastric mucosa  - Small bowel through with normal findings.  - Consulted GI for GJ tube placement, but was canceled due to ongoing fevers  - Hold Reglan and Marinol 3/8 for neuro symptoms --> restarted reglan and marinol 3/19  - set goals for caloric intake with patient, will try nocturnal TPN to encourage appetite during the day

## 2018-03-20 NOTE — PLAN OF CARE
"Problem: Patient Care Overview  Goal: Plan of Care Review  Outcome: Ongoing (interventions implemented as appropriate)  -AAOx4  -HD yesterday with 2.5L removed.   -C/o abdominal pain moderately relieved with PRN oxy.   -Marinol and Reglan given to increase appetite but pt c/o nausea after taking meds and IV Zofran given with moderately relief. Pt states " I can not put anything in my stomach right now!" when asked if he was going to eat his dinner tray.   -TPN continued @ 48mL/hr.   -Plan for HD on Wednesday.     Problem: Fall Risk (Adult)  Goal: Identify Related Risk Factors and Signs and Symptoms  Related risk factors and signs and symptoms are identified upon initiation of Human Response Clinical Practice Guideline (CPG)   Outcome: Ongoing (interventions implemented as appropriate)  -Independent, mother @ bedside attentive to pt needs.   -Pt wears non-slip socks when ambulating.   -Pt free from falls/injuries thus far this shift.   -Bed in low, locked position. Bed rails up x2. Call light within reach.       "

## 2018-03-20 NOTE — ASSESSMENT & PLAN NOTE
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, CMV D+/R+, steroid induction, on maintenance tacro/MMF/pred; c/b seizures, LAURA, superificial wound infection s/p wound vac to [facility] through 1/12, and several recent admissions on 11/24 and 12/3 with culture negative peritonitis with peak ascites WBC ~5k due to suspected indolent biliary leakage s/p sphincterotomy/biliary stent placement over CBD stricture on 12/6 ERCP vs another IA source) who was admitted on 1/11/2017 with acute onset fevers and acute on chronic N/V/abdominal pain (RLQ worst) and was found to have a loculated RLQ collection on imaging (not safe for percutaneous drainage), for which, after failure with more conservative treatments, he ultimately underwent laparoscopic drainage on 2/7/2018 (with dark brown fluid drained, culture negative but after extensive antibiotic exposure) but without complete resolution. He achieved defervescence but had persistent N/V/abdominal pain on empiric ertapenem with persistent loculated fluid noted on imaging that is not accessible to percutaneous drainage. Extensive FUO workup has been performed that has failed to yield another obvious etiology for his fevers (including serial chest CT's, bland thoracenteses, negative fungal markers/quant gold intermediate, serial CT A/P with IV/oral contrast at times notable for residual fluid in RLQ abscess following drainage last seen on 2/26 CT A/P 8x5cm; negative sinus CT; repeat EGD with erythematous, congested, nodular gastric mucosa and a duodenal ulcer 3/6/2018 - biopsy pending; negative SBFT; negative TTE; negative ultrasound for thrombus; serial ERCP's without recent pathology; negative liver biopsy; negative MRI; 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 He remains tenuous but has been afebrile without a leukocytosis after >1 month of antibiotics.     - would stop cefepime today and monitor off  antibiotics  - monitor GI symptoms and fever curve with cessation of antibiotics -- if they relapse, would repeat CT A/P to reassess RLQ fluid collection and consider lower endoscopy since I do not think he has undergone this evaluation yet in extensive FUO workup  - additional prophylaxis per protocol (valcyte, atovaquone given bactrim allergy, and isavuconazole given complex course -- will also taper off the later as expected if he does well)

## 2018-03-20 NOTE — PT/OT/SLP PROGRESS
"Physical Therapy      Patient Name:  Jhonny Diana   MRN:  [id number]    Patient not seen today secondary to Pain. Attempted to see pt in PM - pt pleasantly declined therapy d/t pain. Pt stated "if I try to move and get out of bed I'm going to cry."  Will follow-up next scheduled date.    William Yost, PT   3/20/2018      "

## 2018-03-20 NOTE — SUBJECTIVE & OBJECTIVE
Scheduled Meds:   bisacodyl  10 mg Oral Daily    cycloSPORINE modified  125 mg Oral BID    docusate sodium  100 mg Oral BID    dronabinol  2.5 mg Oral QHS    epoetin maurisio (PROCRIT) injection  10,000 Units Intravenous Every Mon, Wed, Fri    ergocalciferol  50,000 Units Oral Q7 Days    fat emulsion 20%  250 mL Intravenous Once    heparin (porcine)  5,000 Units Subcutaneous Q8H    custom IVPB builder   Intravenous Q24H    levetiracetam oral soln  500 mg Oral BID    levothyroxine  75 mcg Oral Before breakfast    metoclopramide HCl  10 mg Oral QID (AC & HS)    multivitamin  1 tablet Oral Daily    omeprazole  40 mg Oral QAM    polyethylene glycol  17 g Oral BID    predniSONE  20 mg Oral Daily    psyllium husk (aspartame)  3.4 g Oral BID    ursodiol  300 mg Oral BID     Continuous Infusions:   TPN ADULT CENTRAL LINE CUSTOM 48 mL/hr at 03/19/18 2245    TPN ADULT CENTRAL LINE CUSTOM       PRN Meds:sodium chloride 0.9%, acetaminophen, albuterol-ipratropium 2.5mg-0.5mg/3mL, bisacodyl, butalbital-acetaminophen-caffeine -40 mg, dextrose 50%, dextrose 50%, diphenhydrAMINE-zinc acetate 1-0.1%, glucagon (human recombinant), glucose, glucose, heparin (porcine), naloxone, ondansetron, ondansetron, oxyCODONE, oxyCODONE, prochlorperazine, simethicone, sodium chloride 0.9%, sodium chloride 0.9%    Review of Systems   Constitutional: Positive for activity change, appetite change and fatigue. Negative for fever.   HENT: Negative.  Negative for facial swelling.    Eyes: Negative.    Respiratory: Negative.  Negative for apnea, chest tightness, shortness of breath and wheezing.    Cardiovascular: Negative.  Negative for chest pain, palpitations and leg swelling.   Gastrointestinal: Positive for abdominal distention, abdominal pain and constipation. Negative for diarrhea, nausea and vomiting.   Genitourinary: Positive for decreased urine volume. Negative for difficulty urinating, dysuria, hematuria and urgency.    Musculoskeletal: Negative.  Negative for back pain, gait problem, neck pain and neck stiffness.   Skin: Negative.  Negative for color change and pallor.   Allergic/Immunologic: Positive for immunocompromised state.   Neurological: Negative.  Negative for dizziness, seizures, weakness, light-headedness and headaches.   Psychiatric/Behavioral: Positive for dysphoric mood. Negative for behavioral problems, confusion, hallucinations, sleep disturbance and suicidal ideas. The patient is not nervous/anxious.         Tearful     Objective:     Vital Signs (Most Recent):  Temp: 99.5 °F (37.5 °C) (03/20/18 1128)  Pulse: 99 (03/20/18 1128)  Resp: 18 (03/20/18 1128)  BP: (!) 160/88 (03/20/18 1128)  SpO2: 97 % (03/20/18 1128) Vital Signs (24h Range):  Temp:  [98.3 °F (36.8 °C)-99.5 °F (37.5 °C)] 99.5 °F (37.5 °C)  Pulse:  [62-99] 99  Resp:  [16-18] 18  SpO2:  [94 %-98 %] 97 %  BP: (137-160)/(88-93) 160/88     Weight: 74.8 kg (164 lb 14.5 oz)  Body mass index is 26.62 kg/m².    Intake/Output - Last 3 Shifts       03/18 0700 - 03/19 0659 03/19 0700 - 03/20 0659 03/20 0700 - 03/21 0659    P.O. 315 255 175    I.V. (mL/kg)  0 (0) 0 (0)    Other  600 0    IV Piggyback 100 300     TPN 1151.2 1689.3 336    Total Intake(mL/kg) 1566.2 (20.9) 2844.3 (38) 511 (6.8)    Urine (mL/kg/hr) 915 (0.5) 800 (0.4) 100 (0.2)    Emesis/NG output 0 (0) 0 (0)     Other 0 (0) 3750 (2.1)     Stool 0 (0) 0 (0) 0 (0)    Blood 0 (0) 0 (0)     Total Output 915 4550 100    Net +651.2 -1705.7 +411           Urine Occurrence 0 x 0 x 0 x    Stool Occurrence 0 x 0 x 2 x    Emesis Occurrence 0 x 0 x           Physical Exam   Constitutional: He is oriented to person, place, and time. He appears well-developed.   Hand and temporal muscle wasting   HENT:   Head: Normocephalic.   Eyes: Pupils are equal, round, and reactive to light. No scleral icterus.   Cardiovascular: Normal rate, regular rhythm, normal heart sounds and intact distal pulses.    Pulmonary/Chest:  Effort normal. No respiratory distress. He has decreased breath sounds in the right lower field and the left lower field. He has no wheezes. He has no rhonchi. He has no rales.   Abdominal: Soft. Bowel sounds are normal. He exhibits distension. There is no tenderness.   Pain with palpation   Musculoskeletal: Normal range of motion. He exhibits edema (generalized).   Neurological: He is alert and oriented to person, place, and time.   Skin: Skin is warm and dry.   Psychiatric: He has a normal mood and affect. His behavior is normal. Judgment and thought content normal.   Nursing note and vitals reviewed.      Laboratory:  Immunosuppressants         Stop Route Frequency     cycloSPORINE modified capsule 125 mg      -- Oral 2 times daily     cycloSPORINE modified (NEORAL) 25 MG capsule      03/09 1714       cycloSPORINE modified (NEORAL) 100 MG capsule      03/09 1714       cycloSPORINE modified (NEORAL) 100 MG capsule      03/09 0514       cycloSPORINE modified (NEORAL) 25 MG capsule      03/09 0514          CBC:   Recent Labs  Lab 03/20/18  0400   WBC 5.85   RBC 2.45*   HGB 7.8*   HCT 24.1*   PLT 88*   MCV 98   MCH 31.8*   MCHC 32.4     CMP:   Recent Labs  Lab 03/20/18  0400   GLU 89   CALCIUM 8.4*   ALBUMIN 2.4*   PROT 5.7*      K 3.6   CO2 23      BUN 39*   CREATININE 2.5*   ALKPHOS 93   ALT 6*   AST 30   BILITOT 1.3*     Coagulation: No results for input(s): PT, INR, APTT in the last 168 hours.  Labs within the past 24 hours have been reviewed.    Diagnostic Results:  I have personally reviewed all pertinent imaging studies.

## 2018-03-20 NOTE — PROGRESS NOTES
[facility]  Infectious Disease  Progress Note    Patient Name: Jhonny Diana  MRN: [id number]  Admission Date: 1/11/2018  Length of Stay: 68 days  Attending Physician: Nathanael Medina MD  Primary Care Provider: Primary Doctor No    Isolation Status: No active isolations  Assessment/Plan:      Fever    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, CMV D+/R+, steroid induction, on maintenance tacro/MMF/pred; c/b seizures, LAURA, superificial wound infection s/p wound vac to [facility] through 1/12, and several recent admissions on 11/24 and 12/3 with culture negative peritonitis with peak ascites WBC ~5k due to suspected indolent biliary leakage s/p sphincterotomy/biliary stent placement over CBD stricture on 12/6 ERCP vs another IA source) who was admitted on 1/11/2017 with acute onset fevers and acute on chronic N/V/abdominal pain (RLQ worst) and was found to have a loculated RLQ collection on imaging (not safe for percutaneous drainage), for which, after failure with more conservative treatments, he ultimately underwent laparoscopic drainage on 2/7/2018 (with dark brown fluid drained, culture negative but after extensive antibiotic exposure) but without complete resolution. He achieved defervescence but had persistent N/V/abdominal pain on empiric ertapenem with persistent loculated fluid noted on imaging that is not accessible to percutaneous drainage. Extensive FUO workup has been performed that has failed to yield another obvious etiology for his fevers (including serial chest CT's, bland thoracenteses, negative fungal markers/quant gold intermediate, serial CT A/P with IV/oral contrast at times notable for residual fluid in RLQ abscess following drainage last seen on 2/26 CT A/P 8x5cm; negative sinus CT; repeat EGD with erythematous, congested, nodular gastric mucosa and a duodenal ulcer 3/6/2018 - biopsy pending; negative SBFT; negative  TTE; negative ultrasound for thrombus; serial ERCP's without recent pathology; negative liver biopsy; negative MRI; 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 He remains tenuous but has been afebrile without a leukocytosis after >1 month of antibiotics.     - would stop cefepime today and monitor off antibiotics  - monitor GI symptoms and fever curve with cessation of antibiotics -- if they relapse, would repeat CT A/P to reassess RLQ fluid collection and consider lower endoscopy since I do not think he has undergone this evaluation yet in extensive FUO workup  - additional prophylaxis per protocol (valcyte, atovaquone given bactrim allergy, and isavuconazole given complex course -- will also taper off the later as expected if he does well)            Anticipated Disposition: pending    Thank you for your consult. I will follow-up with patient. Please contact us if you have any additional questions.    Rk Che MD  Infectious Disease Fellow, PGY-5  Spectra: [phone]  Pager: [phone]  [facility]    Subjective:     Principal Problem:Fever    HPI: No notes on file  Interval History: afebrile, continues with abdominal pain, otherwise no changes in overall medical condition    Review of Systems   Constitutional: Negative for chills and fever.   HENT: Negative for sore throat.    Respiratory: Negative for cough, chest tightness and shortness of breath.    Cardiovascular: Negative for chest pain, palpitations and leg swelling.   Gastrointestinal: Positive for abdominal distention and abdominal pain. Negative for diarrhea, nausea and vomiting.   Genitourinary: Negative for dysuria, flank pain and urgency.   Skin: Negative for rash.   Neurological: Negative for dizziness, light-headedness and numbness.   Psychiatric/Behavioral: Negative for confusion.     Objective:     Vital Signs (Most Recent):  Temp: 97.4 °F (36.3 °C) (03/20/18 1536)  Pulse: 84 (03/20/18 1536)  Resp: 16  (03/20/18 1536)  BP: (!) 154/93 (03/20/18 1536)  SpO2: 96 % (03/20/18 1536) Vital Signs (24h Range):  Temp:  [97.4 °F (36.3 °C)-99.5 °F (37.5 °C)] 97.4 °F (36.3 °C)  Pulse:  [84-99] 84  Resp:  [16-18] 16  SpO2:  [94 %-98 %] 96 %  BP: (137-160)/(88-93) 154/93     Weight: 74.8 kg (164 lb 14.5 oz)  Body mass index is 26.62 kg/m².    Estimated Creatinine Clearance: 39.7 mL/min (A) (based on SCr of 2.5 mg/dL (H)).    Physical Exam   Constitutional: He is oriented to person, place, and time. No distress.   HENT:   Head: Normocephalic and atraumatic.   Mouth/Throat: No oropharyngeal exudate.   Eyes: No scleral icterus.   Cardiovascular: Normal rate, regular rhythm and normal heart sounds.  Exam reveals no gallop and no friction rub.    No murmur heard.  Pulmonary/Chest: Effort normal and breath sounds normal. No respiratory distress. He has no wheezes. He has no rales.   Abdominal: He exhibits distension. There is tenderness.   Musculoskeletal: He exhibits no edema.   Neurological: He is alert and oriented to person, place, and time. No cranial nerve deficit.   Vitals reviewed.      Significant Labs:   Bilirubin:   Recent Labs  Lab 03/16/18  0357 03/17/18  0530 03/18/18  0500 03/19/18  0500 03/20/18  0400   BILITOT 1.4* 1.4* 1.4* 1.5* 1.3*     Blood Culture:   Recent Labs  Lab 03/03/18  1238 03/06/18  1631 03/06/18  1714 03/08/18  1648 03/08/18  1650   LABBLOO No growth after 5 days. No growth after 5 days. No growth after 5 days. No growth after 5 days. No growth after 5 days.     BMP:   Recent Labs  Lab 03/20/18  0400   GLU 89      K 3.6      CO2 23   BUN 39*   CREATININE 2.5*   CALCIUM 8.4*   MG 1.6     CBC:   Recent Labs  Lab 03/19/18  0500 03/20/18  0400   WBC 5.96 5.85   HGB 8.2* 7.8*   HCT 25.1* 24.1*   PLT 98* 88*     CMP:   Recent Labs  Lab 03/19/18  0500 03/20/18  0400    140   K 3.7 3.6    106   CO2 22* 23   GLU 87 89   BUN 71* 39*   CREATININE 3.8* 2.5*   CALCIUM 9.3 8.4*   PROT 6.2 5.7*    ALBUMIN 2.8* 2.4*   BILITOT 1.5* 1.3*   ALKPHOS 95 93   AST 26 30   ALT 7* 6*   ANIONGAP 14 11   EGFRNONAA 20.3* 33.7*     Microbiology Results (last 7 days)     Procedure Component Value Units Date/Time    AFB Culture & Smear [[id number]] Collected:  01/11/18 1335    Order Status:  Completed Specimen:  Pleural Fluid from Lung, Left Updated:  03/15/18 2127     AFB Culture & Smear No growth after 8 weeks.      AFB CULTURE STAIN No acid fast bacilli seen.    Fungus culture [[id number]] Collected:  03/06/18 1500    Order Status:  Completed Specimen:  Body Fluid from Peritoneal Fluid Updated:  03/15/18 1123     Fungus (Mycology) Culture Culture in progress    Blood culture [[id number]] Collected:  03/08/18 1650    Order Status:  Completed Specimen:  Blood from Line, Jugular, Internal Left Updated:  03/13/18 2212     Blood Culture, Routine No growth after 5 days.    Blood culture [[id number]] Collected:  03/08/18 1648    Order Status:  Completed Specimen:  Blood Updated:  03/13/18 1822     Blood Culture, Routine No growth after 5 days.          Significant Imaging: I have reviewed all pertinent imaging results/findings within the past 24 hours.

## 2018-03-20 NOTE — PROGRESS NOTES
Shereen Mcguire, MATILDE notified pt c/o abdominal cramping and pain.  3 BMs so far today, last one loose.  Oxy 5 mg given about 45 minutes ago without any relief obtained so far.      Per DNP continue to monitor pt. If pt has another loose stool we will send it off for culture.  No other orders at this time.

## 2018-03-20 NOTE — ASSESSMENT & PLAN NOTE
- Post op course complicated by fevers and hyperbilirubinemia, AST/ALT stable.  - see biliary stricture of transplanted liver  - Liver biopsy 1/23 without rejection.  - Paracentesis 3/6 negative for infection, wbc 28 segs 3%.  - ERCP, EUS biopsy 3/9/18 - no rejection, mild cholestatic hepatitis seen.

## 2018-03-20 NOTE — SUBJECTIVE & OBJECTIVE
Interval History: afebrile, continues with abdominal pain, otherwise no changes in overall medical condition    Review of Systems   Constitutional: Negative for chills and fever.   HENT: Negative for sore throat.    Respiratory: Negative for cough, chest tightness and shortness of breath.    Cardiovascular: Negative for chest pain, palpitations and leg swelling.   Gastrointestinal: Positive for abdominal distention and abdominal pain. Negative for diarrhea, nausea and vomiting.   Genitourinary: Negative for dysuria, flank pain and urgency.   Skin: Negative for rash.   Neurological: Negative for dizziness, light-headedness and numbness.   Psychiatric/Behavioral: Negative for confusion.     Objective:     Vital Signs (Most Recent):  Temp: 97.4 °F (36.3 °C) (03/20/18 1536)  Pulse: 84 (03/20/18 1536)  Resp: 16 (03/20/18 1536)  BP: (!) 154/93 (03/20/18 1536)  SpO2: 96 % (03/20/18 1536) Vital Signs (24h Range):  Temp:  [97.4 °F (36.3 °C)-99.5 °F (37.5 °C)] 97.4 °F (36.3 °C)  Pulse:  [84-99] 84  Resp:  [16-18] 16  SpO2:  [94 %-98 %] 96 %  BP: (137-160)/(88-93) 154/93     Weight: 74.8 kg (164 lb 14.5 oz)  Body mass index is 26.62 kg/m².    Estimated Creatinine Clearance: 39.7 mL/min (A) (based on SCr of 2.5 mg/dL (H)).    Physical Exam   Constitutional: He is oriented to person, place, and time. No distress.   HENT:   Head: Normocephalic and atraumatic.   Mouth/Throat: No oropharyngeal exudate.   Eyes: No scleral icterus.   Cardiovascular: Normal rate, regular rhythm and normal heart sounds.  Exam reveals no gallop and no friction rub.    No murmur heard.  Pulmonary/Chest: Effort normal and breath sounds normal. No respiratory distress. He has no wheezes. He has no rales.   Abdominal: He exhibits distension. There is tenderness.   Musculoskeletal: He exhibits no edema.   Neurological: He is alert and oriented to person, place, and time. No cranial nerve deficit.   Vitals reviewed.      Significant Labs:   Bilirubin:    Recent Labs  Lab 03/16/18  0357 03/17/18  0530 03/18/18  0500 03/19/18  0500 03/20/18  0400   BILITOT 1.4* 1.4* 1.4* 1.5* 1.3*     Blood Culture:   Recent Labs  Lab 03/03/18  1238 03/06/18  1631 03/06/18  1714 03/08/18  1648 03/08/18  1650   LABBLOO No growth after 5 days. No growth after 5 days. No growth after 5 days. No growth after 5 days. No growth after 5 days.     BMP:   Recent Labs  Lab 03/20/18  0400   GLU 89      K 3.6      CO2 23   BUN 39*   CREATININE 2.5*   CALCIUM 8.4*   MG 1.6     CBC:   Recent Labs  Lab 03/19/18  0500 03/20/18  0400   WBC 5.96 5.85   HGB 8.2* 7.8*   HCT 25.1* 24.1*   PLT 98* 88*     CMP:   Recent Labs  Lab 03/19/18  0500 03/20/18  0400    140   K 3.7 3.6    106   CO2 22* 23   GLU 87 89   BUN 71* 39*   CREATININE 3.8* 2.5*   CALCIUM 9.3 8.4*   PROT 6.2 5.7*   ALBUMIN 2.8* 2.4*   BILITOT 1.5* 1.3*   ALKPHOS 95 93   AST 26 30   ALT 7* 6*   ANIONGAP 14 11   EGFRNONAA 20.3* 33.7*     Microbiology Results (last 7 days)     Procedure Component Value Units Date/Time    AFB Culture & Smear [[id number]] Collected:  01/11/18 1335    Order Status:  Completed Specimen:  Pleural Fluid from Lung, Left Updated:  03/15/18 2127     AFB Culture & Smear No growth after 8 weeks.      AFB CULTURE STAIN No acid fast bacilli seen.    Fungus culture [[id number]] Collected:  03/06/18 1500    Order Status:  Completed Specimen:  Body Fluid from Peritoneal Fluid Updated:  03/15/18 1123     Fungus (Mycology) Culture Culture in progress    Blood culture [[id number]] Collected:  03/08/18 1650    Order Status:  Completed Specimen:  Blood from Line, Jugular, Internal Left Updated:  03/13/18 2212     Blood Culture, Routine No growth after 5 days.    Blood culture [[id number]] Collected:  03/08/18 1648    Order Status:  Completed Specimen:  Blood Updated:  03/13/18 1822     Blood Culture, Routine No growth after 5 days.          Significant Imaging: I have reviewed all pertinent imaging  results/findings within the past 24 hours.

## 2018-03-20 NOTE — ASSESSMENT & PLAN NOTE
- h/o constipation, abdominal pain, and N/V  - worsened with SHANE tube and TF --> now on TPN  - encourage bowel regimen  - EGD performed 1/25 and 3/6, gastroparesis, duodenal ulcer, and congested/erythematous gastric mucosa  - Small bowel through with normal findings  - Continue reglan and marinol scheduled, anti-emetics PRN.   - If cont with GI symptoms then may need to consider Flex sig/ C-scope to further eval.

## 2018-03-20 NOTE — PT/OT/SLP PROGRESS
Occupational Therapy      Patient Name:  Jhonny Diana   MRN:  [id number]  1324  Patient not seen today secondary to Patient unwilling to participate. Reports that he has severe back and abdominal pain at this time. Reports that he has been getting OOB with his mom as able and Pt encouraged to attempt OOB and ambulation with mom later if tolerated and pt agrees to recommendation. Will follow-up as schedule allows.    Zulma Renteria OT  3/20/2018

## 2018-03-20 NOTE — PROGRESS NOTES
[facility]  Adult Nutrition  Progress Note    SUMMARY       Recommendations    Recommendation/Intervention: New TPN order x 12 hours of 92 g AA / 182g dex plus IV lipids providing 65% EEN/EPN.     Spent 30 minutes with patient this afternoon discussing good TOLERABLE foods to consume. Encouraged foods that will not cause nausea. Patient agreed to try for 2 days to try lots of different foods and see how he tolerates them. Continue regular diet.      Goals: 1. 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, complicate post-op w/ LAURA ongoing, chronic malnutrition, cirrhosis 2' EtOH  Interdisciplinary Rounds: attended  General Information Comments: Plan to cycle TPN to encourage patient to consume more PO diet, TPN running at goal rate, nutrition remains main problem keeping patient in hospital, kidney function improving no HD today, put [employer] sample in hospital fridge for patient to consume, MD approved giving supplement to patient    Nutrition Discharge Planning: Unable to determine at this time    Nutrition Risk Screen    Nutrition Risk Screen: large or nonhealing wound, burn or pressure ulcer    Nutrition/Diet History    Patient Reported Diet/Restrictions/Preferences: low salt  Typical Food/Fluid Intake: eats very little at this time  Food Preferences: No cultural or Taoist food preferences noted  Do you have any cultural, spiritual, Taoist conflicts, given your current situation?: none  (Retired) Meal/Snack Patterns: pt likes to cook, when feeling well will cook and then only eat bites  Supplemental Drinks or Food Habits:  (protein powders sometimes)  (RETIRED) Functional Status: able to prepare meals, able to purchase food,  "ambulatory  Factors Affecting Nutritional Intake: altered gastrointestinal function, abdominal distention, decreased appetite, nausea/vomiting    Anthropometrics    Temp: 99.5 °F (37.5 °C)  Height Method: Stated  Height: 5' 5.98" (167.6 cm)  Height (inches): 65.98 in  Weight Method: Bed Scale  Weight: 74.8 kg (164 lb 14.5 oz)  Weight (lb): 164.91 lb  Ideal Body Weight (IBW), Male: 141.88 lb  % Ideal Body Weight, Male (lb): 112.68 lb  BMI (Calculated): 25.9  BMI Grade: 25 - 29.9 - overweight  Weight Loss: unintentional  Usual Body Weight (UBW), k kg (2017 )  % Usual Body Weight: 86.71  % Weight Change From Usual Weight: -13.47 %    Anthropometrics Special Consideration         Lab/Procedures/Meds    (RETIRED) Diagnostic Test/Procedure Review: reviewed, pertinent  Pertinent Labs Reviewed: reviewed  Pertinent Labs Comments: BUN 39, Cr 2.5, gfr 33.7, alb 2.4, TBili 1.3  Pertinent Medications Reviewed: reviewed  Pertinent Medications Comments: bisacodyl, epoetin, ergocalciferol, heparin, reglan, MVI    Physical Findings/Assessment    Overall Physical Appearance: generalized wasting, loss of muscle mass, loss of subcutaneous fat, weak  Tubes:  (-)  Oral/Mouth Cavity: WDL  Skin: intact    Estimated/Assessed Needs    Weight Used For Calorie Calculations: 74.8 kg (164 lb 14.5 oz)  Height: 5' 5.98" (167.6 cm)  Energy Calorie Requirements (kcal): 0196-3871 (30-35 kcal/kg)  Energy Need Method: Kcal/kg  20 kcal/kg (kcal): 1496  25 kcal/kg (kcal): 1870  30 kcal/kg (kcal): 2244  35 kcal/kg (kcal): 2618  40 kcal/kg (kcal): 2992.01  45 kcal/kg (kcal): 3366.01  50 kcal/kg (kcal): 3740.01  RMR (Iron-St. Jeor Equation): 1660.5  Protein Requirements: 81-94g (1.3-1.5 g/kg)  Weight Used For Protein Calculations: 62.3 kg (137 lb 5.6 oz)  0.6 gm Protein (gm): 37.46  0.7 gm Protein (gm): 43.7  0.8 gm Protein (gm): 49.94  0.9 gm Protein (gm): 56.19  1.0 gm Protein (gm): 62.43  1.1 gm Protein (gm): 68.67  1.2 gm Protein (gm): " 74.92  1.3 gm Protein (gm): 81.16  1.4 gm Protein (gm): 87.4  1.5 gm Protein (gm): 93.65  1.6 gm Protein (gm): 99.89  1.7 gm Protein (gm): 106.13  1.8 gm Protein (gm): 112.37  1.9 gm Protein (gm): 118.62  2.0 gm Protein (gm): 124.86  2.1 gm Protein (gm): 131.1  2.2 gm Protein (gm): 137.35  2.3 gm Protein (gm): 143.59  2.4 gm Protein (gm): 149.83  2.5 gm Protein (gm): 156.08  Fluid Requirements (mL): 1mL/kcal  Fluid Need Method: RDA Method  RDA Method (mL): 1869       Nutrition Prescription Ordered    Current Diet Order: Regular  Nutrition Order Comments: -  Current Nutrition Support Formula Ordered:  (Custom TPN 92 AA / 182g dex plus IV lipids)  Current Nutrition Support Rate Ordered: 80 (ml)  Current Nutrition Support Frequency Ordered: mL/hr x 12 hrs  Oral Nutrition Supplement: Boost Breeze    Evaluation of Received Nutrient/Fluid Intake    Enteral Calories (kcal): 0  Enteral Protein (gm): 0  Enteral (Free Water) Fluid (mL): 0  Parenteral Calories (kcal): 987  Parenteral Protein (gm): 92  Parenteral Fluid (mL): 960  Lipid Calories (kcals): 500 kcals  GIR (Glucose Infusion Rate) (mg/kg/min): 3.38 mg/kg/min  Total Calories (kcal): 0  Total Calories (kcal/kg): 1487  % Kcal Needs: 71  % Protein Needs: 100  I/O: +542mL since admit  Energy Calories Required: not meeting needs  Protein Required: meeting needs  Fluid Required: meeting needs  Comments: LBM 3/17  Tolerance: tolerating  % Intake of Estimated Energy Needs: 75 - 100 %  % Meal Intake: 0 - 25 %    Nutrition Risk    Level of Risk/Frequency of Follow-up:  (2x/week)     Assessment and Plan    Severe protein-calorie malnutrition    Nutrition Diagnosis  Inadequate oral intake    Related to (etiology):   Decreased appetite, nausea with eating    Signs and Symptoms (as evidenced by):   Pt eating 0% of meals and relying on TPN nutrition for 100% of EPN, EEN     Interventions/Recommendations (treatment strategy):  See recs    Nutrition Diagnosis Status:   Continues              Monitor and Evaluation    Food and Nutrient Intake: energy intake, food and beverage intake  Food and Nutrient Adminstration: diet order  Knowledge/Beliefs/Attitudes: food and nutrition knowledge/skill  Physical Activity and Function: nutrition-related ADLs and IADLs  Anthropometric Measurements: weight, weight change, body mass index  Biochemical Data, Medical Tests and Procedures: electrolyte and renal panel, lipid profile, gastrointestinal profile, glucose/endocrine profile, inflammatory profile  Nutrition-Focused Physical Findings: overall appearance     Nutrition Follow-Up    RD Follow-up?: Yes

## 2018-03-20 NOTE — PROGRESS NOTES
[facility]  Liver Transplant  Progress Note    Patient Name: Jhonny Diana  MRN: [id number]  Admission Date: 2018  Hospital Length of Stay: 68 days  Code Status: Full Code  Primary Care Provider: Primary Doctor No  Post-Operative Day: 152    ORGAN:   LIVER  Disease Etiology: Acute Alcoholic Hepatitis  Donor Type:    - Brain Death  CDC High Risk:   No  Donor CMV Status:   Donor CMV Status: Positive  Donor HBcAB:   Negative  Donor HCV Status:   Negative  Whole or Partial: Whole Liver  Biliary Anastomosis: End to End  Arterial Anatomy: Standard  Subjective:     History of Present Illness:  Jhonny Diana is a 26 y/o male with past medical history of alcoholic cirrhosis.  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 Found to have peritonitis in November (WBC 5000, 75% PNM) neg for bile leak. He was treated initially with vanc/cefepime. Repeat cell counts  with some improvement (WBC 1400, 45% PNM). He has undergone multiple paracenteses as well as abscess drainage of perihepatic fluid collections and treated with antimicrobial therapy but no positive cultures. Of note, biliary stricture also identified and ERCP performed on 2017 with sphincterotomy and biliary stent placed. Liver tests still have not normalized despite intervention, bilirubin and AP remain elevated. Other pertinent PMH current wound vac in place 2/2 wound infection, malnutrition requiring TPN for short course and ongoing hypoalbuminemia, and seizure activity while on prograf and has since been switched to cyclosporine without reoccurrence.  He presented to the ER for fever, abdominal pain, and N/V. He reports fever (103) for 1  day prior. Overnight, he developed N/V, reports small amount of green emesis with new left sided pain. He also endorses worsening SOB with exertion. He was scheduled as an outpatient for follow up paracentesis and IR drainage of fluid collection. CXR in ER shows large pleural effusion with subsegmental atelectasis. Infectious work up initiated in ER. His ANC is 900. Broad spectrum antibiotics initiated in ED. At admit, he denied chest pain, palpitations, diarrhea, constipation, back pain, or any sick contacts.    Hospital Course:  Unclear cause of ongoing fevers. Multiple infectious, diagnostic testing completed. Taken to OR for ex lap 2/6, loculated fluid collections found, cultures NGTD. Multiple blood cultures with NGTD. Thoracentesis 1/11, 1/18, 1/31, and 2/16, cultures NGTD. Paracentesis 1/11, 1/19, and 2/5, cultures NGTD. negative fungal markers/quant gold intermediate. Negative TTE.  IR drainage 1/11, cultures NGTD. Continued with intermittent fevers despite broad spectrum antibiotics that were initially started 1/11, then broadened antibiotics 2/22 from vanc/zosyn to ertapenem. Multiple CT scan of abd obtained that were unremarkable. Tagged WBCs scan 2/27 without source of infection found. He had several episodes of neutropenia requiring neupogen, last given 2/28. 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 Monitoring weekly CMV PCR. Chimerism studies obtained 3/1 to assess for GVHD, which was negative, EBV PCR negative, adenovirus negative. In addition to fevers, he has consistently c/o abdominal pain and N/V. Tbili has remain elevated post-op requiring multiple ERCPs. ERCPs performed this admission on 1/30, 2/19, and 3/9. ERCPs with sludge and stones with stents placed. Last ERCP 3/9 with metal stent placed into CBD and stones removed. His ertapenem was d/cd 3/7 due to AMS. Following ERCP 3/9, he was started on cefepime and flagyl  for possible cholangitis. He has remained afebrile since 3/10. He completed cefepime/flagyl after 3 weeks on 3/19 & 3/20. Due to elevated enzymes, he had a liver biopsy 1/23 consistent with cholestatic hepatitis (no cultures obtained during biopsy). GI was consulted for persistent N/V and malnutrition. EGD performed 1/25 and 3/6, EGD 3/6 with normal esophagus congested, erythematous and nodular mucosa in the stomach, one duodenal ulcer with a clean ulcer base (Emir Class III), biopsies taken, negative for CMV and GVHD. Due to N/V and abdominal pain, multiple attempts were made to place SHANE tube with tube feeding, but patient did not tolerate TF. Therefore, he was transitioned to TPN 1/16 then again on 2/24. He also has chronic constipation. Small bowel follow through study with normal findings. Following EGD 1/25, he was started on a PPI, miralax, and Metamucil, but he did not tolerate meds. For appetite stimulation he was trialed on marinol. He also did well with raglan before meals and at bedtime. Early post-op liver transplant surgery, he was switched to cyclosporine for seizures. During this admission, he was transitioned back to prograf 2/24 with keppra on board. Starting 3/1 he developed stuttering speech, disorientation. CT head 3/1 and MRI head 3/6 unremarkable. EEG 3/8 with no seizure activity. He started on rapamune and stress dose steroids 3/10, then ultimately returned to cyclosporine 3/13 and rapamune d/cd same day. Since transitioning back to cyclosporine, his mental status has remained stable. During admission, patient was evaluated by [facility] for future kidney transplant, and he was deemed to be a suitable candidate once medically stable and cleared. He dialyzes on a M-W-F schedule. He was anuric on admission, but starting producing urine around 3/16.     Interval History: no acute events overnight. Continues with N/V, food goals explained with patient, encouraged to slowly improve caloric intake,  starting with 300 calories daily. Continue TPN/Lipids for now. Restarted reglan and marinol yesterday. Continues to ambulate. C/o migraine yesterday, somewhat relieved with fioricet. Continues to have good uop, HD yesterday and still produced 800ml urine. Discussed with nephrology about skipping HD tomorrow, gave albumin x 3 + lasix 80mg IV.     Scheduled Meds:   bisacodyl  10 mg Oral Daily    cycloSPORINE modified  125 mg Oral BID    docusate sodium  100 mg Oral BID    dronabinol  2.5 mg Oral QHS    epoetin maurisio (PROCRIT) injection  10,000 Units Intravenous Every Mon, Wed, Fri    ergocalciferol  50,000 Units Oral Q7 Days    fat emulsion 20%  250 mL Intravenous Once    heparin (porcine)  5,000 Units Subcutaneous Q8H    custom IVPB builder   Intravenous Q24H    levetiracetam oral soln  500 mg Oral BID    levothyroxine  75 mcg Oral Before breakfast    metoclopramide HCl  10 mg Oral QID (AC & HS)    multivitamin  1 tablet Oral Daily    omeprazole  40 mg Oral QAM    polyethylene glycol  17 g Oral BID    predniSONE  20 mg Oral Daily    psyllium husk (aspartame)  3.4 g Oral BID    ursodiol  300 mg Oral BID     Continuous Infusions:   TPN ADULT CENTRAL LINE CUSTOM 48 mL/hr at 03/19/18 2245    TPN ADULT CENTRAL LINE CUSTOM       PRN Meds:sodium chloride 0.9%, acetaminophen, albuterol-ipratropium 2.5mg-0.5mg/3mL, bisacodyl, butalbital-acetaminophen-caffeine -40 mg, dextrose 50%, dextrose 50%, diphenhydrAMINE-zinc acetate 1-0.1%, glucagon (human recombinant), glucose, glucose, heparin (porcine), naloxone, ondansetron, ondansetron, oxyCODONE, oxyCODONE, prochlorperazine, simethicone, sodium chloride 0.9%, sodium chloride 0.9%    Review of Systems   Constitutional: Positive for activity change, appetite change and fatigue. Negative for fever.   HENT: Negative.  Negative for facial swelling.    Eyes: Negative.    Respiratory: Negative.  Negative for apnea, chest tightness, shortness of breath and  wheezing.    Cardiovascular: Negative.  Negative for chest pain, palpitations and leg swelling.   Gastrointestinal: Positive for abdominal distention, abdominal pain and constipation. Negative for diarrhea, nausea and vomiting.   Genitourinary: Positive for decreased urine volume. Negative for difficulty urinating, dysuria, hematuria and urgency.   Musculoskeletal: Negative.  Negative for back pain, gait problem, neck pain and neck stiffness.   Skin: Negative.  Negative for color change and pallor.   Allergic/Immunologic: Positive for immunocompromised state.   Neurological: Negative.  Negative for dizziness, seizures, weakness, light-headedness and headaches.   Psychiatric/Behavioral: Positive for dysphoric mood. Negative for behavioral problems, confusion, hallucinations, sleep disturbance and suicidal ideas. The patient is not nervous/anxious.         Tearful     Objective:     Vital Signs (Most Recent):  Temp: 99.5 °F (37.5 °C) (03/20/18 1128)  Pulse: 99 (03/20/18 1128)  Resp: 18 (03/20/18 1128)  BP: (!) 160/88 (03/20/18 1128)  SpO2: 97 % (03/20/18 1128) Vital Signs (24h Range):  Temp:  [98.3 °F (36.8 °C)-99.5 °F (37.5 °C)] 99.5 °F (37.5 °C)  Pulse:  [62-99] 99  Resp:  [16-18] 18  SpO2:  [94 %-98 %] 97 %  BP: (137-160)/(88-93) 160/88     Weight: 74.8 kg (164 lb 14.5 oz)  Body mass index is 26.62 kg/m².    Intake/Output - Last 3 Shifts       03/18 0700 - 03/19 0659 03/19 0700 - 03/20 0659 03/20 0700 - 03/21 0659    P.O. 315 255 175    I.V. (mL/kg)  0 (0) 0 (0)    Other  600 0    IV Piggyback 100 300     TPN 1151.2 1689.3 336    Total Intake(mL/kg) 1566.2 (20.9) 2844.3 (38) 511 (6.8)    Urine (mL/kg/hr) 915 (0.5) 800 (0.4) 100 (0.2)    Emesis/NG output 0 (0) 0 (0)     Other 0 (0) 3750 (2.1)     Stool 0 (0) 0 (0) 0 (0)    Blood 0 (0) 0 (0)     Total Output 915 4550 100    Net +651.2 -1705.7 +411           Urine Occurrence 0 x 0 x 0 x    Stool Occurrence 0 x 0 x 2 x    Emesis Occurrence 0 x 0 x           Physical  Exam   Constitutional: He is oriented to person, place, and time. He appears well-developed.   Hand and temporal muscle wasting   HENT:   Head: Normocephalic.   Eyes: Pupils are equal, round, and reactive to light. No scleral icterus.   Cardiovascular: Normal rate, regular rhythm, normal heart sounds and intact distal pulses.    Pulmonary/Chest: Effort normal. No respiratory distress. He has decreased breath sounds in the right lower field and the left lower field. He has no wheezes. He has no rhonchi. He has no rales.   Abdominal: Soft. Bowel sounds are normal. He exhibits distension. There is no tenderness.   Pain with palpation   Musculoskeletal: Normal range of motion. He exhibits edema (generalized).   Neurological: He is alert and oriented to person, place, and time.   Skin: Skin is warm and dry.   Psychiatric: He has a normal mood and affect. His behavior is normal. Judgment and thought content normal.   Nursing note and vitals reviewed.      Laboratory:  Immunosuppressants         Stop Route Frequency     cycloSPORINE modified capsule 125 mg      -- Oral 2 times daily     cycloSPORINE modified (NEORAL) 25 MG capsule      03/09 1714       cycloSPORINE modified (NEORAL) 100 MG capsule      03/09 1714       cycloSPORINE modified (NEORAL) 100 MG capsule      03/09 0514       cycloSPORINE modified (NEORAL) 25 MG capsule      03/09 0514          CBC:   Recent Labs  Lab 03/20/18  0400   WBC 5.85   RBC 2.45*   HGB 7.8*   HCT 24.1*   PLT 88*   MCV 98   MCH 31.8*   MCHC 32.4     CMP:   Recent Labs  Lab 03/20/18  0400   GLU 89   CALCIUM 8.4*   ALBUMIN 2.4*   PROT 5.7*      K 3.6   CO2 23      BUN 39*   CREATININE 2.5*   ALKPHOS 93   ALT 6*   AST 30   BILITOT 1.3*     Coagulation: No results for input(s): PT, INR, APTT in the last 168 hours.  Labs within the past 24 hours have been reviewed.    Diagnostic Results:  I have personally reviewed all pertinent imaging studies.    Assessment/Plan:     Fever    -  Started on vanc/cefepime for ERCP 1/11 then transitioned to vanc/zosyn on 1/12 due to fevers, then transitioned to ertapenem 2/22, ertapenem was d/c'd 3/7 due to AMS changes, following ERCP 3/9, he was treated with 3 week course of flagyl/cefepime, which he completed on 3/20.   - thoracentesis 1/11, 1/18, 1/31, and 2/16--all without infection, cultures NGTD  - multiple blood cultures sent over admission, all NGTD  - cmv positive 2/1--see below for CMV information  - taken to OR 2/7 for exp lap - fluid collections drained, cultures NGTD.  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  - IR attempted drainage on fluid collection on 2/27, however, per IR,there was limited fluid interposed with bowel in the RLQ with no safe pocket seen for drainage.  - Tagged WBC scan 2/27, negative results.  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 EBV PCR negative. Adenovirus negative. Repeat CMV PCR negative.    - Remains afebrile since 3/10 following ERCP 3/9 with metal stent placement  - Bx with cholestatic hepatitis --> couldn't cx secondary to EUS contamination --> will cx next bx  - Plan to continue abx (flagyl/cefepime) x 14 days to treat cholangitis --> discussed with ID, plan to dc 1 antibiotic over 3 day period: d/c flagyl 3/19, d/c cefepime 3/20, and d/c isavu 3/21  - Bandemia noted on labs 3/14 was 6 then 3/15 and 3/16 was 7.0, now trending down. Continue to monitor.        Severe protein-calorie malnutrition    - Poor PO intake since transplant requiring short course of TPN during previous hospital stay.   - Due to N/V and abdominal pain, multiple attempts were made to place SHANE tube with tube feeding, but patient did not tolerate TF  - Transitioned to TPN 1/16 then again on 2/24 due to poor tolerance of SHANE tube/TF  - prealbumin 7 on 1/15 --> prealbumin 22 on 3/19, monitoring weekly  - EGD performed 1/25 and 3/6,  gastroparesis, duodenal ulcer, and congested/erythematous gastric mucosa  - Small bowel through with normal findings.  - Consulted GI for GJ tube placement, but was canceled due to ongoing fevers  - Hold Reglan and Marinol 3/8 for neuro symptoms --> restarted reglan and marinol 3/19  - set goals for caloric intake with patient, will try nocturnal TPN to encourage appetite during the day        Biliary stricture of transplanted liver    - ERCP 12/6 with post-anastomosis stricture with stent placement.  - Tbili with increase prompting ERCP 1/30 with stones and sludge, stents placed.  - Repeat ERCP 2/19 as was having recurrent fevers with bilirubin stuck at 2.0-2.2 with overall unremarkable findings seen. Did place new larger stents.   - ERCP/EUS 3/9 with mental stent placed in bile duct, stones removed  - Tbili remains stable. Continue actigall.        Nausea and vomiting    - h/o constipation, abdominal pain, and N/V  - worsened with SHANE tube and TF --> now on TPN  - encourage bowel regimen  - EGD performed 1/25 and 3/6, gastroparesis, duodenal ulcer, and congested/erythematous gastric mucosa  - Small bowel through with normal findings  - Continue reglan and marinol scheduled, anti-emetics PRN.   - If cont with GI symptoms then may need to consider Flex sig/ C-scope to further eval.         Recurrent pleural effusion on right    - CXR in ER with large right pleural effusion with subjective c/o worsening SOB.  - Multiple Thora's this admission, all non-infectious: 1/11, 1/19, 1/31, 2/16  - CT A/P 2/26 with large R pleural effusion, may need repeat thora if fevers reoccur.    - complaining of sob, obtain chest xray, trial dose of diamox 3/14 with minimal UOP  - chest xray with large pleural effusion 3/13 --> may need thora soon if becomes symptomatic or develops fever  - Fluid removal with HD        Liver transplanted    - Post op course complicated by fevers and hyperbilirubinemia, AST/ALT stable.  - see biliary  stricture of transplanted liver  - Liver biopsy 1/23 without rejection.  - Paracentesis 3/6 negative for infection, wbc 28 segs 3%.  - ERCP, EUS biopsy 3/9/18 - no rejection, mild cholestatic hepatitis seen.         Vitamin D deficiency    - Continue ergo 50K weekly.         Kidney transplant candidate              Other cytomegaloviral diseases    - detected at 370 on CMV PCR 2/1.  - case discussed with ID and will hold off on treatment at this time given low WBC.   - undetected CMV PCR 2/8, 2/15, 2/22, 3/8   - continue Valcyte 200 mg every Mon, Wed, Fri.  - Per ID, valcyte dose decreased to ppx dose on 2/18--Valcyte d/c'd 2/25  - Repeat scope 3/6 to assess persistent N/V and possible CMV causing these symptoms. Biopsies thus far normal, cmv stains negative.  - continue to monitor weekly CMV PCR's, last PCR neg from 3/15.        Constipation    - chronic  - encourage ambulation, stool softeners and laxatives, often refuses some meds 2/2 nausea, will often require suppository or brown bomb enema to have a BM  - Small bowel follow through with normal findings        Anemia of chronic disease    - 1 U PRBC 3/12  - Monitor daily labs. H/H stable, transfuse PRN.         Delayed surgical wound healing    - Wd vac removed 1/12/18.    - Wound healing well.   - Changing dressing q Tuesday, last 3/13          Seizure    - 3/1, patient with stuttering speech, trouble recalling specific words when speaking. Does have hx of seizure (on keppra) previously while on prograf. Switched to prograf over the weekend from cyclosporine. Neuro exam unremarkable.   - CT head without contrast obtained, unremarkable.   - In light of these symptoms, prograf d/c, restart cyclosporine 3/1.  - remains w/o seizure. Cont to have stutter/garbled speech/difficulty finding words.  Worse post EGD as sedation was given.  - MRI 3/6 unremarkable.  - EEG completed 3/8 - mild, generalized, non-specific cerebral dysfunction, no epileptiform activity  -  monitor closely. VSS.        At risk for opportunistic infections    - see other cytomegaloviral disease  - Pentam given 2/24.  - continue isavu for complicated post-op course, plan to d/c on 3/21        Long-term use of immunosuppressant medication    - Maintenance IS with cyclosporine --> transitioned to Prograf 2/24 for continued nausea but did not tolerate from neuro standpoint.  PT IS NOT A CANDIDATE FOR TACROLIMUS.    - Transitioned back to cyclosporine 3/1  - D/c cyclosporine, started rapa & stress dose steroids 3/10 for continued confusion  - Mental status now improved, re-trial cyclosporine 3/13, d/c rapa 3/13  - Transition back to prednisone taper from stress dose steroids 3/15        Prophylactic immunotherapy    - See long term use of immunosuppression.         Acute renal failure with tubular necrosis    - HD resumed on previous admission. Now anuric and dialyzes M-W-F.  - Nephrology following.    - Goal is for kidney txp in future- currently not a candidate due to health status.  - urine output has greatly improved over last several days, >900ml uop daily.    - discussed with nephrology about possibly skipping HD day to monitor renal function  - Trial albumin 25% x 3 doses + Lasix 80mg IV today.            VTE Risk Mitigation         Ordered     heparin (porcine) injection 1,000 Units  As needed (PRN)     Route:  Intra-Catheter        03/12/18 1134     heparin (porcine) injection 5,000 Units  Every 8 hours     Route:  Subcutaneous        03/08/18 1622     Medium Risk of VTE  Once      01/11/18 0351     Place sequential compression device  Until discontinued      01/11/18 0351          The patients clinical status was discussed at multidisplinary rounds, involving transplant surgery, transplant medicine, pharmacy, nursing, nutrition, and social work    Discharge Planning: not stable for discharge at this time.   Monitor kidney function --> may need outpt HD  Monitor HH needs vs rehab for deconditioned  status    Shereen Mcguire, MATILDE  Liver Transplant  [facility] [place]

## 2018-03-20 NOTE — ASSESSMENT & PLAN NOTE
- see other cytomegaloviral disease  - Pentam given 2/24.  - continue isavu for complicated post-op course, plan to d/c on 3/21

## 2018-03-20 NOTE — ASSESSMENT & PLAN NOTE
- HD resumed on previous admission. Now anuric and dialyzes M-W-F.  - Nephrology following.    - Goal is for kidney txp in future- currently not a candidate due to health status.  - urine output has greatly improved over last several days, >900ml uop daily.    - discussed with nephrology about possibly skipping HD day to monitor renal function  - Trial albumin 25% x 3 doses + Lasix 80mg IV today.

## 2018-03-20 NOTE — ASSESSMENT & PLAN NOTE
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  - Tbili with increase prompting ERCP 1/30 with stones and sludge, stents placed.  - Repeat ERCP 2/19 as was having recurrent fevers with bilirubin stuck at 2.0-2.2 with overall unremarkable findings seen. Did place new larger stents.   - ERCP/EUS 3/9 with mental stent placed in bile duct, stones removed  - Tbili remains stable. Continue actigall.

## 2018-03-20 NOTE — ASSESSMENT & PLAN NOTE
Nutrition Diagnosis  Inadequate oral intake    Related to (etiology):   Decreased appetite, nausea with eating    Signs and Symptoms (as evidenced by):   Pt eating 0% of meals and relying on TPN nutrition for 100% of EPN, EEN     Interventions/Recommendations (treatment strategy):  See recs    Nutrition Diagnosis Status:   Continues

## 2018-03-21 LAB
ALBUMIN SERPL BCP-MCNC: 2.9 G/DL
ALP SERPL-CCNC: 91 U/L
ALT SERPL W/O P-5'-P-CCNC: 5 U/L
ANION GAP SERPL CALC-SCNC: 10 MMOL/L
ANISOCYTOSIS BLD QL SMEAR: SLIGHT
AST SERPL-CCNC: 23 U/L
BASOPHILS # BLD AUTO: ABNORMAL K/UL
BASOPHILS NFR BLD: 0 %
BILIRUB SERPL-MCNC: 1.3 MG/DL
BUN SERPL-MCNC: 56 MG/DL
CALCIUM SERPL-MCNC: 9.3 MG/DL
CHLORIDE SERPL-SCNC: 105 MMOL/L
CO2 SERPL-SCNC: 24 MMOL/L
CREAT SERPL-MCNC: 3.3 MG/DL
CYCLOSPORINE BLD LC/MS/MS-MCNC: 180 NG/ML
DIFFERENTIAL METHOD: ABNORMAL
EOSINOPHIL # BLD AUTO: ABNORMAL K/UL
EOSINOPHIL NFR BLD: 0 %
ERYTHROCYTE [DISTWIDTH] IN BLOOD BY AUTOMATED COUNT: 18.6 %
EST. GFR  (AFRICAN AMERICAN): 27.8 ML/MIN/1.73 M^2
EST. GFR  (NON AFRICAN AMERICAN): 24.1 ML/MIN/1.73 M^2
GLUCOSE SERPL-MCNC: 102 MG/DL
HCT VFR BLD AUTO: 25.2 %
HGB BLD-MCNC: 8 G/DL
HYPOCHROMIA BLD QL SMEAR: ABNORMAL
IMM GRANULOCYTES # BLD AUTO: ABNORMAL K/UL
IMM GRANULOCYTES NFR BLD AUTO: ABNORMAL %
LYMPHOCYTES # BLD AUTO: ABNORMAL K/UL
LYMPHOCYTES NFR BLD: 12 %
MAGNESIUM SERPL-MCNC: 2.1 MG/DL
MCH RBC QN AUTO: 32 PG
MCHC RBC AUTO-ENTMCNC: 31.7 G/DL
MCV RBC AUTO: 101 FL
MONOCYTES # BLD AUTO: ABNORMAL K/UL
MONOCYTES NFR BLD: 6 %
NEUTROPHILS NFR BLD: 78 %
NEUTS BAND NFR BLD MANUAL: 4 %
NRBC BLD-RTO: 0 /100 WBC
OVALOCYTES BLD QL SMEAR: ABNORMAL
PHOSPHATE SERPL-MCNC: 3.4 MG/DL
PLATELET # BLD AUTO: 101 K/UL
PMV BLD AUTO: 12.6 FL
POIKILOCYTOSIS BLD QL SMEAR: SLIGHT
POLYCHROMASIA BLD QL SMEAR: ABNORMAL
POTASSIUM SERPL-SCNC: 3.5 MMOL/L
PROT SERPL-MCNC: 6.3 G/DL
RBC # BLD AUTO: 2.5 M/UL
SODIUM SERPL-SCNC: 139 MMOL/L
WBC # BLD AUTO: 6.55 K/UL

## 2018-03-21 PROCEDURE — 97116 GAIT TRAINING THERAPY: CPT

## 2018-03-21 PROCEDURE — A4217 STERILE WATER/SALINE, 500 ML: HCPCS | Performed by: PHYSICIAN ASSISTANT

## 2018-03-21 PROCEDURE — 85007 BL SMEAR W/DIFF WBC COUNT: CPT

## 2018-03-21 PROCEDURE — 63600175 PHARM REV CODE 636 W HCPCS: Performed by: NURSE PRACTITIONER

## 2018-03-21 PROCEDURE — 85027 COMPLETE CBC AUTOMATED: CPT

## 2018-03-21 PROCEDURE — 25000003 PHARM REV CODE 250: Performed by: SURGERY

## 2018-03-21 PROCEDURE — 99233 SBSQ HOSP IP/OBS HIGH 50: CPT | Mod: ,,, | Performed by: INTERNAL MEDICINE

## 2018-03-21 PROCEDURE — 63600175 PHARM REV CODE 636 W HCPCS: Mod: JG | Performed by: SURGERY

## 2018-03-21 PROCEDURE — 83735 ASSAY OF MAGNESIUM: CPT

## 2018-03-21 PROCEDURE — P9047 ALBUMIN (HUMAN), 25%, 50ML: HCPCS | Mod: JG | Performed by: PHYSICIAN ASSISTANT

## 2018-03-21 PROCEDURE — 20600001 HC STEP DOWN PRIVATE ROOM

## 2018-03-21 PROCEDURE — 80053 COMPREHEN METABOLIC PANEL: CPT

## 2018-03-21 PROCEDURE — 80158 DRUG ASSAY CYCLOSPORINE: CPT

## 2018-03-21 PROCEDURE — B4185 PARENTERAL SOL 10 GM LIPIDS: HCPCS | Performed by: PHYSICIAN ASSISTANT

## 2018-03-21 PROCEDURE — 25000003 PHARM REV CODE 250: Performed by: NURSE PRACTITIONER

## 2018-03-21 PROCEDURE — 25000003 PHARM REV CODE 250: Performed by: PHYSICIAN ASSISTANT

## 2018-03-21 PROCEDURE — 63600175 PHARM REV CODE 636 W HCPCS: Mod: JG | Performed by: PHYSICIAN ASSISTANT

## 2018-03-21 PROCEDURE — 84100 ASSAY OF PHOSPHORUS: CPT

## 2018-03-21 PROCEDURE — 97530 THERAPEUTIC ACTIVITIES: CPT

## 2018-03-21 PROCEDURE — 99233 SBSQ HOSP IP/OBS HIGH 50: CPT | Mod: ,,, | Performed by: PHYSICIAN ASSISTANT

## 2018-03-21 PROCEDURE — 97535 SELF CARE MNGMENT TRAINING: CPT

## 2018-03-21 PROCEDURE — 99232 SBSQ HOSP IP/OBS MODERATE 35: CPT | Mod: ,,, | Performed by: NURSE PRACTITIONER

## 2018-03-21 RX ORDER — FUROSEMIDE 10 MG/ML
80 INJECTION INTRAMUSCULAR; INTRAVENOUS ONCE
Status: DISCONTINUED | OUTPATIENT
Start: 2018-03-21 | End: 2018-03-22

## 2018-03-21 RX ORDER — ALBUMIN HUMAN 250 G/1000ML
25 SOLUTION INTRAVENOUS ONCE
Status: COMPLETED | OUTPATIENT
Start: 2018-03-21 | End: 2018-03-21

## 2018-03-21 RX ORDER — FUROSEMIDE 10 MG/ML
120 INJECTION INTRAMUSCULAR; INTRAVENOUS ONCE
Status: DISCONTINUED | OUTPATIENT
Start: 2018-03-21 | End: 2018-03-22

## 2018-03-21 RX ADMIN — CYCLOSPORINE 125 MG: 100 CAPSULE, LIQUID FILLED ORAL at 05:03

## 2018-03-21 RX ADMIN — SODIUM CHLORIDE: 9 INJECTION, SOLUTION INTRAVENOUS at 12:03

## 2018-03-21 RX ADMIN — DOCUSATE SODIUM 100 MG: 100 CAPSULE, LIQUID FILLED ORAL at 09:03

## 2018-03-21 RX ADMIN — HEPARIN SODIUM 5000 UNITS: 5000 INJECTION, SOLUTION INTRAVENOUS; SUBCUTANEOUS at 08:03

## 2018-03-21 RX ADMIN — OXYCODONE HYDROCHLORIDE 5 MG: 5 TABLET ORAL at 02:03

## 2018-03-21 RX ADMIN — LEVETIRACETAM 500 MG: 100 SOLUTION ORAL at 09:03

## 2018-03-21 RX ADMIN — MAGNESIUM SULFATE HEPTAHYDRATE: 500 INJECTION, SOLUTION INTRAMUSCULAR; INTRAVENOUS at 09:03

## 2018-03-21 RX ADMIN — OXYCODONE HYDROCHLORIDE 5 MG: 5 TABLET ORAL at 07:03

## 2018-03-21 RX ADMIN — PREDNISONE 20 MG: 10 TABLET ORAL at 05:03

## 2018-03-21 RX ADMIN — URSODIOL 300 MG: 300 CAPSULE ORAL at 05:03

## 2018-03-21 RX ADMIN — LEVETIRACETAM 500 MG: 100 SOLUTION ORAL at 08:03

## 2018-03-21 RX ADMIN — ALBUMIN HUMAN 25 G: 0.25 SOLUTION INTRAVENOUS at 09:03

## 2018-03-21 RX ADMIN — BISACODYL 10 MG: 5 TABLET, COATED ORAL at 11:03

## 2018-03-21 RX ADMIN — URSODIOL 300 MG: 300 CAPSULE ORAL at 08:03

## 2018-03-21 RX ADMIN — OXYCODONE HYDROCHLORIDE 5 MG: 5 TABLET ORAL at 03:03

## 2018-03-21 RX ADMIN — LEVOTHYROXINE SODIUM 75 MCG: 75 TABLET ORAL at 05:03

## 2018-03-21 RX ADMIN — ERYTHROPOIETIN 12600 UNITS: 20000 INJECTION, SOLUTION INTRAVENOUS; SUBCUTANEOUS at 02:03

## 2018-03-21 RX ADMIN — METOCLOPRAMIDE HYDROCHLORIDE 10 MG: 5 SOLUTION ORAL at 08:03

## 2018-03-21 RX ADMIN — OXYCODONE HYDROCHLORIDE 5 MG: 5 TABLET ORAL at 11:03

## 2018-03-21 RX ADMIN — METOCLOPRAMIDE HYDROCHLORIDE 10 MG: 5 SOLUTION ORAL at 05:03

## 2018-03-21 RX ADMIN — OMEPRAZOLE 40 MG: 40 CAPSULE, DELAYED RELEASE ORAL at 06:03

## 2018-03-21 RX ADMIN — SOYBEAN OIL 250 ML: 20 INJECTION, SOLUTION INTRAVENOUS at 09:03

## 2018-03-21 RX ADMIN — DOCUSATE SODIUM 100 MG: 100 CAPSULE, LIQUID FILLED ORAL at 08:03

## 2018-03-21 RX ADMIN — DRONABINOL 2.5 MG: 2.5 CAPSULE ORAL at 08:03

## 2018-03-21 RX ADMIN — METOCLOPRAMIDE HYDROCHLORIDE 10 MG: 5 SOLUTION ORAL at 11:03

## 2018-03-21 NOTE — PROGRESS NOTES
Seen for follow up on chevron incision  Mother reports he had blistering along the closed incision site .   Upon removal of hydrocolloid dressing, note what appears to be two open blisters lateral to transverse incision line. Unsure if this is due to medical adhesive related skin injury (MARSI) or due to ascites. Resolving dry blister to the medial aspect of old incision noted.     03/21/18 1030       Incision/Site 10/30/17 0911 abdomen transverse   Date First Assessed/Time First Assessed: 10/30/17 0911   Present Prior to Hospital Arrival?: Yes  Location: abdomen  Orientation: transverse   Wound Image    Incision WDL ex   Dressing Appearance Clean;Intact;Moist drainage   Drainage Amount Scant   Drainage Characteristics/Odor Serous   Appearance Pink;Blistered;Moist   Red (%), Wound Tissue Color 100 %  (pink)   Periwound Area Scar tissue   Wound Edges Open   Wound Length (cm) 0.6   Wound Width (cm) 1   Depth (cm) 0.2  (appears to be two conjoined blisters )   Care Cleansed with:;Sterile normal saline   Dressing Applied;Hydrocolloid     Applied hydrocolloid dressing to site to allow moist wound healing.  Zoë Murphy RN CWON  [id number]

## 2018-03-21 NOTE — ASSESSMENT & PLAN NOTE
- HD resumed on previous admission. Now anuric and dialyzes M-W-F.  - Nephrology following.    - Goal is for kidney txp in future- currently not a candidate due to health status.  - urine output has improved over last several days since 3/16, around 900ml uop daily.    - HD held today- will reassess in AM  - Trial albumin 25% + Lasix 120 mg IV today.

## 2018-03-21 NOTE — PLAN OF CARE
Problem: Physical Therapy Goal  Goal: Physical Therapy Goal  Goals to be met by: 3/19/18     Patient will increase functional independence with mobility by performin. Supine to sit with Modified Walsh. - GOAL MET  2. Sit to stand transfer with Minimal Assistance. - GOAL MET  3. Bed to chair transfer with Contact Guard Assistance using Rolling Walker. - GOAL MET  4. Gait  x 150 feet with Contact Guard Assistance using Rolling Walker. - GOAL MET    5. Sit to supine with Modified Walsh.  6. Lower extremity exercise program x 20 reps per handout, with assistance as needed.  7. Pt to perf 10x sit<>stand: 1 min 18 sec, to demonstrate improvements in B LE mm strength.  8. Pt to perf 6MWT: amb 566', to demonstrate a clinically significant improvement in aerobic capacity.    9. Pt to amb 150' with DOC YANCEY.      Outcome: Ongoing (interventions implemented as appropriate)  Progressing well towards goals    William Yost DPT  3/21/2018

## 2018-03-21 NOTE — PROGRESS NOTES
[facility]  Infectious Disease  Progress Note    Patient Name: Jhonny Diana  MRN: [id number]  Admission Date: 1/11/2018  Length of Stay: 69 days  Attending Physician: Nathanael Medina MD  Primary Care Provider: Primary Doctor No    Isolation Status: No active isolations  Assessment/Plan:      Fever    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, CMV D+/R+, steroid induction, on maintenance tacro/MMF/pred; c/b seizures, LAURA, superificial wound infection s/p wound vac to [facility] through 1/12, and several recent admissions on 11/24 and 12/3 with culture negative peritonitis with peak ascites WBC ~5k due to suspected indolent biliary leakage s/p sphincterotomy/biliary stent placement over CBD stricture on 12/6 ERCP vs another IA source) who was admitted on 1/11/2017 with acute onset fevers and acute on chronic N/V/abdominal pain (RLQ worst) and was found to have a loculated RLQ collection on imaging (not safe for percutaneous drainage), for which, after failure with more conservative treatments, he ultimately underwent laparoscopic drainage on 2/7/2018 (with dark brown fluid drained, culture negative but after extensive antibiotic exposure) but without complete resolution. He achieved defervescence but had persistent N/V/abdominal pain on empiric ertapenem with persistent loculated fluid noted on imaging that is not accessible to percutaneous drainage. Extensive FUO workup has been performed that has failed to yield another obvious etiology for his fevers (including serial chest CT's, bland thoracenteses, negative fungal markers/quant gold intermediate, serial CT A/P with IV/oral contrast at times notable for residual fluid in RLQ abscess following drainage last seen on 2/26 CT A/P 8x5cm; negative sinus CT; repeat EGD with erythematous, congested, nodular gastric mucosa and a duodenal ulcer 3/6/2018 - biopsy pending; negative SBFT; negative  TTE; negative ultrasound for thrombus; serial ERCP's without recent pathology; negative liver biopsy; negative MRI; 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 He remains tenuous but has been afebrile without a leukocytosis after >1 month of antibiotics.     - patient continue afebrile today, antibiotics stopped yesterday, isavuconazole stopped today; will monitor clinically  - monitor GI symptoms and fever curve with cessation of antibiotics -- if they relapse, would repeat CT A/P to reassess RLQ fluid collection and consider lower endoscopy since I do not think he has undergone this evaluation yet in extensive FUO workup   - additional prophylaxis per protocol (valcyte, atovaquone given bactrim allergy)            Anticipated Disposition: monitor off antibiotics    Thank you for your consult. I will sign off. Please contact us if you have any additional questions.    Rk Che MD  Infectious Disease Fellow, PGY-5  Spectra: [phone]  Pager: [phone]  [facility]    Subjective:     Principal Problem:Fever    HPI: No notes on file  Interval History: afebrile, no overnight events, continues with abdominal pain and back pain    Review of Systems   Constitutional: Negative for chills and fever.   HENT: Negative for sore throat.    Respiratory: Negative for cough, chest tightness and shortness of breath.    Cardiovascular: Negative for chest pain, palpitations and leg swelling.   Gastrointestinal: Positive for abdominal distention and abdominal pain. Negative for diarrhea, nausea and vomiting.   Genitourinary: Negative for dysuria, flank pain and urgency.   Skin: Negative for rash.   Neurological: Negative for dizziness, light-headedness and numbness.   Psychiatric/Behavioral: Negative for confusion.     Objective:     Vital Signs (Most Recent):  Temp: 98.9 °F (37.2 °C) (03/21/18 1222)  Pulse: 97 (03/21/18 1222)  Resp: 18 (03/21/18 1222)  BP: (!) 154/93 (03/21/18 1222)  SpO2: 97 %  (03/21/18 1222) Vital Signs (24h Range):  Temp:  [97.4 °F (36.3 °C)-99.3 °F (37.4 °C)] 98.9 °F (37.2 °C)  Pulse:  [84-99] 97  Resp:  [16-20] 18  SpO2:  [95 %-97 %] 97 %  BP: (149-161)/() 154/93     Weight: 71 kg (156 lb 8.4 oz)  Body mass index is 25.28 kg/m².    Estimated Creatinine Clearance: 30.1 mL/min (A) (based on SCr of 3.3 mg/dL (H)).    Physical Exam   Constitutional: He is oriented to person, place, and time. No distress.   HENT:   Head: Normocephalic and atraumatic.   Mouth/Throat: No oropharyngeal exudate.   Eyes: No scleral icterus.   Cardiovascular: Normal rate, regular rhythm and normal heart sounds.  Exam reveals no gallop and no friction rub.    No murmur heard.  Pulmonary/Chest: Effort normal and breath sounds normal. No respiratory distress. He has no wheezes. He has no rales.   Abdominal: He exhibits distension. There is tenderness.   Musculoskeletal: He exhibits no edema.   Neurological: He is alert and oriented to person, place, and time. No cranial nerve deficit.   Vitals reviewed.      Significant Labs:   Bilirubin:     Recent Labs  Lab 03/17/18  0530 03/18/18  0500 03/19/18  0500 03/20/18  0400 03/21/18  0500   BILITOT 1.4* 1.4* 1.5* 1.3* 1.3*     Blood Culture:     Recent Labs  Lab 03/03/18  1238 03/06/18  1631 03/06/18  1714 03/08/18  1648 03/08/18  1650   LABBLOO No growth after 5 days. No growth after 5 days. No growth after 5 days. No growth after 5 days. No growth after 5 days.     BMP:     Recent Labs  Lab 03/21/18  0500         K 3.5      CO2 24   BUN 56*   CREATININE 3.3*   CALCIUM 9.3   MG 2.1     CBC:     Recent Labs  Lab 03/20/18  0400 03/21/18  0500   WBC 5.85 6.55   HGB 7.8* 8.0*   HCT 24.1* 25.2*   PLT 88* 101*     CMP:     Recent Labs  Lab 03/20/18  0400 03/21/18  0500    139   K 3.6 3.5    105   CO2 23 24   GLU 89 102   BUN 39* 56*   CREATININE 2.5* 3.3*   CALCIUM 8.4* 9.3   PROT 5.7* 6.3   ALBUMIN 2.4* 2.9*   BILITOT 1.3* 1.3*   ALKPHOS  93 91   AST 30 23   ALT 6* 5*   ANIONGAP 11 10   EGFRNONAA 33.7* 24.1*     Microbiology Results (last 7 days)     Procedure Component Value Units Date/Time    AFB Culture & Smear [[id number]] Collected:  01/11/18 1335    Order Status:  Completed Specimen:  Pleural Fluid from Lung, Left Updated:  03/15/18 2127     AFB Culture & Smear No growth after 8 weeks.      AFB CULTURE STAIN No acid fast bacilli seen.    Fungus culture [[id number]] Collected:  03/06/18 1500    Order Status:  Completed Specimen:  Body Fluid from Peritoneal Fluid Updated:  03/15/18 1123     Fungus (Mycology) Culture Culture in progress          Significant Imaging: I have reviewed all pertinent imaging results/findings within the past 24 hours.

## 2018-03-21 NOTE — PROGRESS NOTES
[facility]  Liver Transplant  Progress Note    Patient Name: Jhonny Diana  MRN: [id number]  Admission Date: 2018  Hospital Length of Stay: 69 days  Code Status: Full Code  Primary Care Provider: Primary Doctor No  Post-Operative Day: 153    ORGAN:   LIVER  Disease Etiology: Acute Alcoholic Hepatitis  Donor Type:    - Brain Death  CDC High Risk:   No  Donor CMV Status:   Donor CMV Status: Positive  Donor HBcAB:   Negative  Donor HCV Status:   Negative  Whole or Partial: Whole Liver  Biliary Anastomosis: End to End  Arterial Anatomy: Standard  Subjective:     History of Present Illness:  Jhonny Diana is a 28 y/o male with past medical history of alcoholic cirrhosis.  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 Found to have peritonitis in November (WBC 5000, 75% PNM) neg for bile leak. He was treated initially with vanc/cefepime. Repeat cell counts  with some improvement (WBC 1400, 45% PNM). He has undergone multiple paracenteses as well as abscess drainage of perihepatic fluid collections and treated with antimicrobial therapy but no positive cultures. Of note, biliary stricture also identified and ERCP performed on 2017 with sphincterotomy and biliary stent placed. Liver tests still have not normalized despite intervention, bilirubin and AP remain elevated. Other pertinent PMH current wound vac in place 2/2 wound infection, malnutrition requiring TPN for short course and ongoing hypoalbuminemia, and seizure activity while on prograf and has since been switched to cyclosporine without reoccurrence.  He presented to the ER for fever, abdominal pain, and N/V. He reports fever (103) for 1  day prior. Overnight, he developed N/V, reports small amount of green emesis with new left sided pain. He also endorses worsening SOB with exertion. He was scheduled as an outpatient for follow up paracentesis and IR drainage of fluid collection. CXR in ER shows large pleural effusion with subsegmental atelectasis. Infectious work up initiated in ER. His ANC is 900. Broad spectrum antibiotics initiated in ED. At admit, he denied chest pain, palpitations, diarrhea, constipation, back pain, or any sick contacts.    Hospital Course:  Unclear cause of ongoing fevers. Multiple infectious, diagnostic testing completed. Taken to OR for ex lap 2/6, loculated fluid collections found, cultures NGTD. Multiple blood cultures with NGTD. Thoracentesis 1/11, 1/18, 1/31, and 2/16, cultures NGTD. Paracentesis 1/11, 1/19, and 2/5, cultures NGTD. negative fungal markers/quant gold intermediate. Negative TTE.  IR drainage 1/11, cultures NGTD. Continued with intermittent fevers despite broad spectrum antibiotics that were initially started 1/11, then broadened antibiotics 2/22 from vanc/zosyn to ertapenem. Multiple CT scan of abd obtained that were unremarkable. Tagged WBCs scan 2/27 without source of infection found. He had several episodes of neutropenia requiring neupogen, last given 2/28. 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 Monitoring weekly CMV PCR. Chimerism studies obtained 3/1 to assess for GVHD, which was negative, EBV PCR negative, adenovirus negative. In addition to fevers, he has consistently c/o abdominal pain and N/V. Tbili has remain elevated post-op requiring multiple ERCPs. ERCPs performed this admission on 1/30, 2/19, and 3/9. ERCPs with sludge and stones with stents placed. Last ERCP 3/9 with metal stent placed into CBD and stones removed. His ertapenem was d/cd 3/7 due to AMS. Following ERCP 3/9, he was started on cefepime and flagyl  for possible cholangitis. He has remained afebrile since 3/10. He completed cefepime/flagyl after 3 weeks on 3/19 & 3/20. Due to elevated enzymes, he had a liver biopsy 1/23 consistent with cholestatic hepatitis (no cultures obtained during biopsy). GI was consulted for persistent N/V and malnutrition. EGD performed 1/25 and 3/6, EGD 3/6 with normal esophagus congested, erythematous and nodular mucosa in the stomach, one duodenal ulcer with a clean ulcer base (Emir Class III), biopsies taken, negative for CMV and GVHD. Due to N/V and abdominal pain, multiple attempts were made to place SHANE tube with tube feeding, but patient did not tolerate TF. Therefore, he was transitioned to TPN 1/16 then again on 2/24. He also has chronic constipation. Small bowel follow through study with normal findings. Following EGD 1/25, he was started on a PPI, miralax, and Metamucil, but he did not tolerate meds. For appetite stimulation he was trialed on marinol. He also did well with raglan before meals and at bedtime. Early post-op liver transplant surgery, he was switched to cyclosporine for seizures. During this admission, he was transitioned back to prograf 2/24 with keppra on board. Starting 3/1 he developed stuttering speech, disorientation. CT head 3/1 and MRI head 3/6 unremarkable. EEG 3/8 with no seizure activity. He started on rapamune and stress dose steroids 3/10, then ultimately returned to cyclosporine 3/13 and rapamune d/cd same day. Since transitioning back to cyclosporine, his mental status has remained stable. During admission, patient was evaluated by [facility] for future kidney transplant, and he was deemed to be a suitable candidate once medically stable and cleared. He dialyzes on a M-W-F schedule. He was anuric on admission, but starting producing urine around 3/16 and continues to produce around 800-900cc daily.     Interval History: no acute events overnight. Continues with N/V, reached 250 calories yesterday,  trying slowly improve caloric intake, with goal of 300 calories daily. Continue TPN/Lipids for now. Restarted reglan and marinol 3/19. Continues to ambulate. Continues to have increased uop of 800-900 cc/q day, HD 3/19, held HD today.  Albumin + high dose lasix today.    Scheduled Meds:   bisacodyl  10 mg Oral Daily    cycloSPORINE modified  125 mg Oral BID    docusate sodium  100 mg Oral BID    dronabinol  2.5 mg Oral QHS    epoetin maurisio (PROCRIT) injection  12,600 Units Intravenous Every Mon, Wed, Fri    ergocalciferol  50,000 Units Oral Q7 Days    fat emulsion 20%  250 mL Intravenous Once    furosemide  120 mg Intravenous Once    furosemide  80 mg Intravenous Once    heparin (porcine)  5,000 Units Subcutaneous Q8H    levetiracetam oral soln  500 mg Oral BID    levothyroxine  75 mcg Oral Before breakfast    metoclopramide HCl  10 mg Oral QID (AC & HS)    multivitamin  1 tablet Oral Daily    omeprazole  40 mg Oral QAM    polyethylene glycol  17 g Oral BID    predniSONE  20 mg Oral Daily    psyllium husk (aspartame)  3.4 g Oral BID    ursodiol  300 mg Oral BID     Continuous Infusions:   TPN ADULT CENTRAL LINE CUSTOM Stopped (03/21/18 0942)    TPN ADULT CENTRAL LINE CUSTOM       PRN Meds:sodium chloride 0.9%, acetaminophen, albuterol-ipratropium 2.5mg-0.5mg/3mL, bisacodyl, butalbital-acetaminophen-caffeine -40 mg, dextrose 50%, dextrose 50%, diphenhydrAMINE-zinc acetate 1-0.1%, glucagon (human recombinant), glucose, glucose, heparin (porcine), naloxone, ondansetron, ondansetron, oxyCODONE, oxyCODONE, prochlorperazine, simethicone, sodium chloride 0.9%, sodium chloride 0.9%    Review of Systems   Constitutional: Positive for activity change, appetite change and fatigue. Negative for fever.   HENT: Negative.  Negative for facial swelling.    Eyes: Negative.    Respiratory: Negative.  Negative for apnea, chest tightness, shortness of breath and wheezing.    Cardiovascular: Negative.  Negative  for chest pain, palpitations and leg swelling.   Gastrointestinal: Positive for abdominal distention, abdominal pain and constipation. Negative for diarrhea, nausea and vomiting.   Genitourinary: Positive for decreased urine volume. Negative for difficulty urinating, dysuria, hematuria and urgency.   Musculoskeletal: Negative.  Negative for back pain, gait problem, neck pain and neck stiffness.   Skin: Negative.  Negative for color change and pallor.   Allergic/Immunologic: Positive for immunocompromised state.   Neurological: Negative.  Negative for dizziness, seizures, weakness, light-headedness and headaches.   Psychiatric/Behavioral: Positive for dysphoric mood. Negative for behavioral problems, confusion, hallucinations, sleep disturbance and suicidal ideas. The patient is not nervous/anxious.         Tearful     Objective:     Vital Signs (Most Recent):  Temp: 98.9 °F (37.2 °C) (03/21/18 1222)  Pulse: 97 (03/21/18 1222)  Resp: 18 (03/21/18 1222)  BP: (!) 154/93 (03/21/18 1222)  SpO2: 97 % (03/21/18 1222) Vital Signs (24h Range):  Temp:  [98.6 °F (37 °C)-99.3 °F (37.4 °C)] 98.9 °F (37.2 °C)  Pulse:  [89-99] 97  Resp:  [16-20] 18  SpO2:  [95 %-98 %] 97 %  BP: (149-161)/() 154/93     Weight: 71 kg (156 lb 8.4 oz)  Body mass index is 25.28 kg/m².    Intake/Output - Last 3 Shifts       03/19 0700 - 03/20 0659 03/20 0700 - 03/21 0659 03/21 0700 - 03/22 0659    P.O. 255 325 200    I.V. (mL/kg) 0 (0) 50 (0.7) 0 (0)    Other 600 0 0    IV Piggyback 300 250     TPN 1689.3 1089.3 456    Total Intake(mL/kg) 2844.3 (38) 1714.3 (24.1) 656 (9.2)    Urine (mL/kg/hr) 800 (0.4) 810 (0.5) 475 (0.7)    Emesis/NG output 0 (0) 0 (0)     Other 3750 (2.1) 0 (0)     Stool 0 (0) 0 (0) 0 (0)    Blood 0 (0) 0 (0)     Total Output 4550 810 475    Net -1705.7 +904.3 +181           Urine Occurrence 0 x 0 x 0 x    Stool Occurrence 0 x 3 x 0 x    Emesis Occurrence 0 x 0 x           Physical Exam   Constitutional: He is oriented to  person, place, and time. He appears well-developed.   Hand and temporal muscle wasting   HENT:   Head: Normocephalic.   Eyes: Pupils are equal, round, and reactive to light. No scleral icterus.   Cardiovascular: Normal rate, regular rhythm, normal heart sounds and intact distal pulses.    Pulmonary/Chest: Effort normal. No respiratory distress. He has decreased breath sounds in the right lower field and the left lower field. He has no wheezes. He has no rhonchi. He has no rales.   Abdominal: Soft. Bowel sounds are normal. He exhibits distension. There is no tenderness.   Pain with palpation   Musculoskeletal: Normal range of motion. He exhibits edema (generalized).   Neurological: He is alert and oriented to person, place, and time.   Skin: Skin is warm and dry.   Psychiatric: He has a normal mood and affect. His behavior is normal. Judgment and thought content normal.   Nursing note and vitals reviewed.      Laboratory:  Immunosuppressants         Stop Route Frequency     cycloSPORINE modified capsule 125 mg      -- Oral 2 times daily     cycloSPORINE modified (NEORAL) 25 MG capsule      03/09 1714       cycloSPORINE modified (NEORAL) 100 MG capsule      03/09 1714       cycloSPORINE modified (NEORAL) 100 MG capsule      03/09 0514       cycloSPORINE modified (NEORAL) 25 MG capsule      03/09 0514          CBC:     Recent Labs  Lab 03/21/18  0500   WBC 6.55   RBC 2.50*   HGB 8.0*   HCT 25.2*   *   *   MCH 32.0*   MCHC 31.7*     CMP:     Recent Labs  Lab 03/21/18  0500      CALCIUM 9.3   ALBUMIN 2.9*   PROT 6.3      K 3.5   CO2 24      BUN 56*   CREATININE 3.3*   ALKPHOS 91   ALT 5*   AST 23   BILITOT 1.3*     Coagulation: No results for input(s): PT, INR, APTT in the last 168 hours.  Labs within the past 24 hours have been reviewed.    Diagnostic Results:  I have personally reviewed all pertinent imaging studies.    Assessment/Plan:     Liver transplanted    - Post op course  complicated by fevers and hyperbilirubinemia, AST/ALT stable.  - see biliary stricture of transplanted liver  - Liver biopsy 1/23 without rejection.  - Paracentesis 3/6 negative for infection, wbc 28 segs 3%.  - ERCP, EUS biopsy 3/9/18 - no rejection, mild cholestatic hepatitis seen.         Long-term use of immunosuppressant medication    - Maintenance IS with cyclosporine --> transitioned to Prograf 2/24 for continued nausea but did not tolerate from neuro standpoint.  PT IS NOT A CANDIDATE FOR TACROLIMUS.    - Transitioned back to cyclosporine 3/1  - D/c cyclosporine, started rapa & stress dose steroids 3/10 for continued confusion  - Mental status now improved, re-trial cyclosporine 3/13, d/c rapa 3/13  - Transition back to prednisone taper from stress dose steroids 3/15 --> will decrease by 5 mg q week.        Prophylactic immunotherapy    - See long term use of immunosuppression.         At risk for opportunistic infections    - see other cytomegaloviral disease  - Pentam given 2/24.  - D/C Isavu 3/21        Vitamin D deficiency    - Continue ergo 50K weekly.         Kidney transplant candidate              Other cytomegaloviral diseases    - detected at 370 on CMV PCR 2/1.  - case discussed with ID and will hold off on treatment at this time given low WBC.   - undetected CMV PCR 2/8, 2/15, 2/22, 3/8   - continue Valcyte 200 mg every Mon, Wed, Fri.  - Per ID, valcyte dose decreased to ppx dose on 2/18--Valcyte d/c'd 2/25  - Repeat scope 3/6 to assess persistent N/V and possible CMV causing these symptoms. Biopsies thus far normal, cmv stains negative.  - continue to monitor weekly CMV PCR's, last PCR neg from 3/15.        Fever    - Started on vanc/cefepime for ERCP 1/11 then transitioned to vanc/zosyn on 1/12 due to fevers, then transitioned to ertapenem 2/22, ertapenem was d/c'd 3/7 due to AMS changes, following ERCP 3/9, he was treated with 3 week course of flagyl/cefepime, which he completed on 3/20.   -  thoracentesis 1/11, 1/18, 1/31, and 2/16--all without infection, cultures NGTD  - multiple blood cultures sent over admission, all NGTD  - cmv positive 2/1--see below for CMV information  - taken to OR 2/7 for exp lap - fluid collections drained, cultures NGTD.  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  - IR attempted drainage on fluid collection on 2/27, however, per IR,there was limited fluid interposed with bowel in the RLQ with no safe pocket seen for drainage.  - Tagged WBC scan 2/27, negative results.  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 EBV PCR negative. Adenovirus negative. Repeat CMV PCR negative.    - Remains afebrile since 3/10 following ERCP 3/9 with metal stent placement  - Bx with cholestatic hepatitis --> couldn't cx secondary to EUS contamination --> will cx next bx  - Plan to continue abx (flagyl/cefepime) x 14 days to treat cholangitis --> discussed with ID, plan to dc 1 antibiotic over 3 day period: d/c flagyl 3/19, d/c cefepime 3/20, and d/c isavu 3/21  - Bandemia noted on labs since 3/14. Continue to monitor.        Constipation    - chronic  - encourage ambulation, stool softeners and laxatives, often refuses some meds 2/2 nausea, will often require suppository or brown bomb enema to have a BM  - Small bowel follow through with normal findings        Severe protein-calorie malnutrition    - Poor PO intake since transplant requiring short course of TPN during previous hospital stay.   - Due to N/V and abdominal pain, multiple attempts were made to place SHANE tube with tube feeding, but patient did not tolerate TF  - Transitioned to TPN 1/16 then again on 2/24 due to poor tolerance of SHANE tube/TF  - prealbumin 7 on 1/15 --> prealbumin 22 on 3/19, monitoring weekly  - EGD performed 1/25 and 3/6, gastroparesis, duodenal ulcer, and congested/erythematous gastric mucosa  - Small bowel through  with normal findings.  - Consulted GI for GJ tube placement, but was canceled due to ongoing fevers  - Hold Reglan and Marinol 3/8 for neuro symptoms --> restarted reglan and marinol 3/19  - set goals for caloric intake with patient, will try nocturnal TPN started 3/20 PM to encourage appetite during the day        Nausea and vomiting    - h/o constipation, abdominal pain, and N/V  - worsened with SHANE tube and TF --> now on TPN  - encourage bowel regimen  - EGD performed 1/25 and 3/6, gastroparesis, duodenal ulcer, and congested/erythematous gastric mucosa  - Small bowel through with normal findings  - Continue reglan and marinol scheduled, anti-emetics PRN.   - If cont with GI symptoms then may need to consider Flex sig/ C-scope to further eval.         Recurrent pleural effusion on right    - CXR in ER with large right pleural effusion with subjective c/o worsening SOB.  - Multiple Thora's this admission, all non-infectious: 1/11, 1/19, 1/31, 2/16  - CT A/P 2/26 with large R pleural effusion, may need repeat thora if fevers reoccur.    - complaining of sob, obtain chest xray, trial dose of diamox 3/14 with minimal UOP  - chest xray with large pleural effusion 3/13 --> may need thora soon if becomes symptomatic or develops fever  - Fluid removal with HD        Biliary stricture of transplanted liver    - ERCP 12/6 with post-anastomosis stricture with stent placement.  - Tbili with increase prompting ERCP 1/30 with stones and sludge, stents placed.  - Repeat ERCP 2/19 as was having recurrent fevers with bilirubin stuck at 2.0-2.2 with overall unremarkable findings seen. Did place new larger stents.   - ERCP/EUS 3/9 with mental stent placed in bile duct, stones removed  - Tbili remains stable. Continue actigall.        Anemia of chronic disease    - 1 U PRBC 3/12  - Monitor daily labs. H/H stable, transfuse PRN.         Delayed surgical wound healing    - Wd vac removed 1/12/18.    - Wound healing well.   - Changing  dressing q Tuesday, last 3/20        Seizure    - 3/1, patient with stuttering speech, trouble recalling specific words when speaking. Does have hx of seizure (on keppra) previously while on prograf. Switched to prograf over the weekend from cyclosporine. Neuro exam unremarkable.   - CT head without contrast obtained, unremarkable.   - In light of these symptoms, prograf d/c, restart cyclosporine 3/1.  - remains w/o seizure. Cont to have stutter/garbled speech/difficulty finding words.  Worse post EGD as sedation was given.  - MRI 3/6 unremarkable.  - EEG completed 3/8 - mild, generalized, non-specific cerebral dysfunction, no epileptiform activity  - monitor closely. VSS.        Acute renal failure with tubular necrosis    - HD resumed on previous admission. Now anuric and dialyzes M-W-F.  - Nephrology following.    - Goal is for kidney txp in future- currently not a candidate due to health status.  - urine output has improved over last several days since 3/16, around 900ml uop daily.    - HD held today- will reassess in AM  - Trial albumin 25% + Lasix 120 mg IV today.            VTE Risk Mitigation         Ordered     heparin (porcine) injection 1,000 Units  As needed (PRN)     Route:  Intra-Catheter        03/12/18 1134     heparin (porcine) injection 5,000 Units  Every 8 hours     Route:  Subcutaneous        03/08/18 1622     Medium Risk of VTE  Once      01/11/18 0351     Place sequential compression device  Until discontinued      01/11/18 0351          The patients clinical status was discussed at multidisplinary rounds, involving transplant surgery, transplant medicine, pharmacy, nursing, nutrition, and social work    Discharge Planning:  Monitor kidney function --> may need outpt HD  Monitor HH needs vs rehab for deconditioned status      Geronimo Vera PA-C  Liver Transplant  [facility]

## 2018-03-21 NOTE — ASSESSMENT & PLAN NOTE
- Maintenance IS with cyclosporine --> transitioned to Prograf 2/24 for continued nausea but did not tolerate from neuro standpoint.  PT IS NOT A CANDIDATE FOR TACROLIMUS.    - Transitioned back to cyclosporine 3/1  - D/c cyclosporine, started rapa & stress dose steroids 3/10 for continued confusion  - Mental status now improved, re-trial cyclosporine 3/13, d/c rapa 3/13  - Transition back to prednisone taper from stress dose steroids 3/15 --> will decrease by 5 mg q week.

## 2018-03-21 NOTE — ASSESSMENT & PLAN NOTE
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, CMV D+/R+, steroid induction, on maintenance tacro/MMF/pred; c/b seizures, LAURA, superificial wound infection s/p wound vac to [facility] through 1/12, and several recent admissions on 11/24 and 12/3 with culture negative peritonitis with peak ascites WBC ~5k due to suspected indolent biliary leakage s/p sphincterotomy/biliary stent placement over CBD stricture on 12/6 ERCP vs another IA source) who was admitted on 1/11/2017 with acute onset fevers and acute on chronic N/V/abdominal pain (RLQ worst) and was found to have a loculated RLQ collection on imaging (not safe for percutaneous drainage), for which, after failure with more conservative treatments, he ultimately underwent laparoscopic drainage on 2/7/2018 (with dark brown fluid drained, culture negative but after extensive antibiotic exposure) but without complete resolution. He achieved defervescence but had persistent N/V/abdominal pain on empiric ertapenem with persistent loculated fluid noted on imaging that is not accessible to percutaneous drainage. Extensive FUO workup has been performed that has failed to yield another obvious etiology for his fevers (including serial chest CT's, bland thoracenteses, negative fungal markers/quant gold intermediate, serial CT A/P with IV/oral contrast at times notable for residual fluid in RLQ abscess following drainage last seen on 2/26 CT A/P 8x5cm; negative sinus CT; repeat EGD with erythematous, congested, nodular gastric mucosa and a duodenal ulcer 3/6/2018 - biopsy pending; negative SBFT; negative TTE; negative ultrasound for thrombus; serial ERCP's without recent pathology; negative liver biopsy; negative MRI; 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 He remains tenuous but has been afebrile without a leukocytosis after >1 month of antibiotics.     - patient continue afebrile today,  antibiotics stopped yesterday, isavuconazole stopped today; will monitor clinically  - monitor GI symptoms and fever curve with cessation of antibiotics -- if they relapse, would repeat CT A/P to reassess RLQ fluid collection and consider lower endoscopy since I do not think he has undergone this evaluation yet in extensive FUO workup   - additional prophylaxis per protocol (valcyte, atovaquone given bactrim allergy)

## 2018-03-21 NOTE — SUBJECTIVE & OBJECTIVE
Interval History: . Net pos 900 ml. Patient refused lasix 80 mg IV today as patient felt no increase UOP after lasix 80 mg IV given at noon yesterday.     Review of patient's allergies indicates:   Allergen Reactions    Bactrim [sulfamethoxazole-trimethoprim] Other (See Comments)     Mookie Trell Syndrome     Current Facility-Administered Medications   Medication Frequency    0.9%  NaCl infusion PRN    acetaminophen tablet 650 mg Q6H PRN    albuterol-ipratropium 2.5mg-0.5mg/3mL nebulizer solution 3 mL Q4H PRN    bisacodyl EC tablet 10 mg Daily    bisacodyl suppository 10 mg Daily PRN    butalbital-acetaminophen-caffeine -40 mg per tablet 1 tablet Q4H PRN    cycloSPORINE modified capsule 125 mg BID    dextrose 50% injection 12.5 g PRN    dextrose 50% injection 25 g PRN    diphenhydrAMINE-zinc acetate 1-0.1% cream TID PRN    docusate sodium capsule 100 mg BID    dronabinol capsule 2.5 mg QHS    epoetin maurisio injection 10,000 Units Every Mon, Wed, Fri    ergocalciferol capsule 50,000 Units Q7 Days    fat emulsion 20% infusion 250 mL Once    furosemide injection 80 mg Once    glucagon (human recombinant) injection 1 mg PRN    glucose chewable tablet 16 g PRN    glucose chewable tablet 24 g PRN    heparin (porcine) injection 1,000 Units PRN    heparin (porcine) injection 5,000 Units Q8H    isavuconazonium sulfate 372 mg in sodium chloride 0.9% 250 mL Q24H    levetiracetam oral soln Soln 500 mg BID    levothyroxine tablet 75 mcg Before breakfast    metoclopramide HCl 5 mg/5 mL solution 10 mg QID (AC & HS)    multivitamin tablet 1 tablet Daily    naloxone 0.4 mg/mL injection 0.2 mg PRN    omeprazole capsule 40 mg QAM    ondansetron disintegrating tablet 8 mg Q6H PRN    ondansetron injection 4 mg Q6H PRN    oxyCODONE immediate release tablet 5 mg Q4H PRN    oxyCODONE immediate release tablet 5 mg Nightly PRN    polyethylene glycol packet 17 g BID    predniSONE tablet 20 mg  Daily    prochlorperazine injection Soln 10 mg Q6H PRN    psyllium husk (aspartame) 3.4 gram PwPk 1 packet BID    simethicone chewable tablet 80 mg TID PRN    sodium chloride 0.9% flush 3 mL PRN    sodium chloride 0.9% flush 3 mL PRN    TPN ADULT CENTRAL LINE CUSTOM Continuous    TPN ADULT CENTRAL LINE CUSTOM Continuous    ursodiol oral suspension (conc: 60 mg/mL) 300 mg BID       Objective:     Vital Signs (Most Recent):  Temp: 98.6 °F (37 °C) (03/21/18 0722)  Pulse: 99 (03/21/18 0722)  Resp: 16 (03/21/18 0722)  BP: (!) 149/92 (03/21/18 0722)  SpO2: 95 % (03/21/18 0722)  O2 Device (Oxygen Therapy): room air (03/21/18 0722) Vital Signs (24h Range):  Temp:  [97.4 °F (36.3 °C)-99.3 °F (37.4 °C)] 98.6 °F (37 °C)  Pulse:  [84-99] 99  Resp:  [16-20] 16  SpO2:  [95 %-97 %] 95 %  BP: (149-161)/() 149/92     Weight: 71 kg (156 lb 8.4 oz) (03/21/18 0400)  Body mass index is 25.28 kg/m².  Body surface area is 1.82 meters squared.    I/O last 3 completed shifts:  In: 2747.6 [P.O.:505; I.V.:50; IV Piggyback:250]  Out: 1235 [Urine:1235]    Physical Exam   Constitutional: He is oriented to person, place, and time.   HENT:   Head: Atraumatic.   Mouth/Throat: No oropharyngeal exudate.   Mild facial edema   Eyes: No scleral icterus.   Cardiovascular: Normal rate and regular rhythm.    Pulmonary/Chest: Effort normal and breath sounds normal. No respiratory distress. He has no wheezes. He has no rales.   Abdominal: Soft. There is no tenderness. There is no rebound.   acites   Musculoskeletal: Edema: mild BLE edema.   Lymphadenopathy:     He has no cervical adenopathy.   Neurological: He is alert and oriented to person, place, and time.   Skin: He is not diaphoretic.       Significant Labs:  All labs within the past 24 hours have been reviewed.     Significant Imaging:  Labs: Reviewed

## 2018-03-21 NOTE — ASSESSMENT & PLAN NOTE
LAURA dialysisi dependant since 1016/2017  HD on TTS [facility] under the care of Dr. Peterson  On HD for: 2.5 mo since 10/16/2017  Duration of outpatient dialysis session - 3.5 hrs  EDW - TBD  Residual Renal Function - yes   -Increase in UOP over weekend. Cr 2.5>3.3. (delta change 0.7>0.8).   -Hold HD today. Strict IO. Daily Weights.   -Recommend lasix 120 IV x 1 and watch first 1-3 hrs for increase UOP. If respond to lasix, then repeat lasix 120 mg 5 pm. Discussed with primary team.     Anemia of CKD  -Increase Epogen by 25%. Please give even if not going to dialysis.

## 2018-03-21 NOTE — PT/OT/SLP PROGRESS
Occupational Therapy   Treatment    Name: Jhonny Diana  MRN: [id number]  Admitting Diagnosis:  Fever  12 Days Post-Op    Recommendations:     Discharge Recommendations: outpatient PT  Discharge Equipment Recommendations:  bath bench  Barriers to discharge:  None    Subjective     Communicated with: nsg prior to session.cc with PT  Pain/Comfort:  · Pain Rating 1: 0/10    Patients cultural, spiritual, Congregation conflicts given the current situation: none    Objective:     Patient found with: telemetry, central line    General Precautions: Standard, fall   Orthopedic Precautions:N/A   Braces:       Occupational Performance:    Bed Mobility:    · Pt up in bathroom on arrival     Functional Mobility/Transfers:    · Functional Mobility: ambulated x 340 ft with RW with spvn without rest break    Activities of Daily Living:  · UB Dressing: contact guard assistance standing to lenora jacket  · LB Dressing: supervision sitting for socks/shoes    Patient left up in chair with call button in reach    [facility] 6 Click:  [facility] Total Score:      Treatment & Education:  Pt ambulated on unit with spvn x 340 ft, practiced standing dynamic balance activities with AROM exercises and cross body punches/reaching x 5 min, spvn with wide base of support, CGA/min assist with narrow base of support, reaching for objects on low surface with CGA.  Performed UB/LB dress. Educated on POC, con't with OOB activity and ambulation with mom, safety with mobility.  Education:    Assessment:     Jhonny Diana is a 27 y.o. male with a medical diagnosis of Fever.  He presents with improved tolerance for activity today, deficits in dynamic balance.  Performance deficits affecting function are weakness, impaired self care skills, impaired cardiopulmonary response to activity, impaired functional mobilty, impaired balance.      Rehab Prognosis:  good; patient would benefit from acute skilled OT services to address these deficits and reach maximum level of function.        Plan:     Patient to be seen 3 x/week to address the above listed problems via self-care/home management, therapeutic activities, therapeutic exercises, community/work re-entry  · Plan of Care Expires: 18  · Plan of Care Reviewed with: patient    This Plan of care has been discussed with the patient who was involved in its development and understands and is in agreement with the identified goals and treatment plan    GOALS:    Occupational Therapy Goals        Problem: Occupational Therapy Goal    Goal Priority Disciplines Outcome Interventions   Occupational Therapy Goal     OT, PT/OT Ongoing (interventions implemented as appropriate)    Description:  Goals to be met by:  2 Weeks (3/24/2018)    Patient will increase functional independence with ADLs by performin. Supine to sit with Supervision.--met  2. Sit to Stand transfers with Supervision- met 3/21/18  3. Toilet transfer to toilet with Supervision- met 3/21/18  4. Grooming while standing with Supervision-met 3/21/18 (using sink/walker to stabilize  5. UE Dressing with Supervision.- met 3/12/18 Revised 3-21-18: UE dress with spvn in standing   6. LE Dressing with Minimal Assistance-met 3/12/18, revised 3/21/18: LE dress with spvn standing  Added goals on 3/21/18:  1)increase dynamic balance to grasp 5/5 ADL items from closet/above shoulder level without UE support with spvn                           Time Tracking:     OT Date of Treatment: 18  OT Start Time: 1410  OT Stop Time: 1433  OT Total Time (min): 23 min    Billable Minutes:Self Care/Home Management 10 min  Therapeutic Activity 13 min    Zulma Renteria OT  3/21/2018

## 2018-03-21 NOTE — ASSESSMENT & PLAN NOTE
- Poor PO intake since transplant requiring short course of TPN during previous hospital stay.   - Due to N/V and abdominal pain, multiple attempts were made to place SHANE tube with tube feeding, but patient did not tolerate TF  - Transitioned to TPN 1/16 then again on 2/24 due to poor tolerance of SHANE tube/TF  - prealbumin 7 on 1/15 --> prealbumin 22 on 3/19, monitoring weekly  - EGD performed 1/25 and 3/6, gastroparesis, duodenal ulcer, and congested/erythematous gastric mucosa  - Small bowel through with normal findings.  - Consulted GI for GJ tube placement, but was canceled due to ongoing fevers  - Hold Reglan and Marinol 3/8 for neuro symptoms --> restarted reglan and marinol 3/19  - set goals for caloric intake with patient, will try nocturnal TPN started 3/20 PM to encourage appetite during the day

## 2018-03-21 NOTE — SUBJECTIVE & OBJECTIVE
Scheduled Meds:   bisacodyl  10 mg Oral Daily    cycloSPORINE modified  125 mg Oral BID    docusate sodium  100 mg Oral BID    dronabinol  2.5 mg Oral QHS    epoetin maurisio (PROCRIT) injection  12,600 Units Intravenous Every Mon, Wed, Fri    ergocalciferol  50,000 Units Oral Q7 Days    fat emulsion 20%  250 mL Intravenous Once    furosemide  120 mg Intravenous Once    furosemide  80 mg Intravenous Once    heparin (porcine)  5,000 Units Subcutaneous Q8H    levetiracetam oral soln  500 mg Oral BID    levothyroxine  75 mcg Oral Before breakfast    metoclopramide HCl  10 mg Oral QID (AC & HS)    multivitamin  1 tablet Oral Daily    omeprazole  40 mg Oral QAM    polyethylene glycol  17 g Oral BID    predniSONE  20 mg Oral Daily    psyllium husk (aspartame)  3.4 g Oral BID    ursodiol  300 mg Oral BID     Continuous Infusions:   TPN ADULT CENTRAL LINE CUSTOM Stopped (03/21/18 0942)    TPN ADULT CENTRAL LINE CUSTOM       PRN Meds:sodium chloride 0.9%, acetaminophen, albuterol-ipratropium 2.5mg-0.5mg/3mL, bisacodyl, butalbital-acetaminophen-caffeine -40 mg, dextrose 50%, dextrose 50%, diphenhydrAMINE-zinc acetate 1-0.1%, glucagon (human recombinant), glucose, glucose, heparin (porcine), naloxone, ondansetron, ondansetron, oxyCODONE, oxyCODONE, prochlorperazine, simethicone, sodium chloride 0.9%, sodium chloride 0.9%    Review of Systems   Constitutional: Positive for activity change, appetite change and fatigue. Negative for fever.   HENT: Negative.  Negative for facial swelling.    Eyes: Negative.    Respiratory: Negative.  Negative for apnea, chest tightness, shortness of breath and wheezing.    Cardiovascular: Negative.  Negative for chest pain, palpitations and leg swelling.   Gastrointestinal: Positive for abdominal distention, abdominal pain and constipation. Negative for diarrhea, nausea and vomiting.   Genitourinary: Positive for decreased urine volume. Negative for difficulty urinating,  dysuria, hematuria and urgency.   Musculoskeletal: Negative.  Negative for back pain, gait problem, neck pain and neck stiffness.   Skin: Negative.  Negative for color change and pallor.   Allergic/Immunologic: Positive for immunocompromised state.   Neurological: Negative.  Negative for dizziness, seizures, weakness, light-headedness and headaches.   Psychiatric/Behavioral: Positive for dysphoric mood. Negative for behavioral problems, confusion, hallucinations, sleep disturbance and suicidal ideas. The patient is not nervous/anxious.         Tearful     Objective:     Vital Signs (Most Recent):  Temp: 98.9 °F (37.2 °C) (03/21/18 1222)  Pulse: 97 (03/21/18 1222)  Resp: 18 (03/21/18 1222)  BP: (!) 154/93 (03/21/18 1222)  SpO2: 97 % (03/21/18 1222) Vital Signs (24h Range):  Temp:  [98.6 °F (37 °C)-99.3 °F (37.4 °C)] 98.9 °F (37.2 °C)  Pulse:  [89-99] 97  Resp:  [16-20] 18  SpO2:  [95 %-98 %] 97 %  BP: (149-161)/() 154/93     Weight: 71 kg (156 lb 8.4 oz)  Body mass index is 25.28 kg/m².    Intake/Output - Last 3 Shifts       03/19 0700 - 03/20 0659 03/20 0700 - 03/21 0659 03/21 0700 - 03/22 0659    P.O. 255 325 200    I.V. (mL/kg) 0 (0) 50 (0.7) 0 (0)    Other 600 0 0    IV Piggyback 300 250     TPN 1689.3 1089.3 456    Total Intake(mL/kg) 2844.3 (38) 1714.3 (24.1) 656 (9.2)    Urine (mL/kg/hr) 800 (0.4) 810 (0.5) 475 (0.7)    Emesis/NG output 0 (0) 0 (0)     Other 3750 (2.1) 0 (0)     Stool 0 (0) 0 (0) 0 (0)    Blood 0 (0) 0 (0)     Total Output 4550 810 475    Net -1705.7 +904.3 +181           Urine Occurrence 0 x 0 x 0 x    Stool Occurrence 0 x 3 x 0 x    Emesis Occurrence 0 x 0 x           Physical Exam   Constitutional: He is oriented to person, place, and time. He appears well-developed.   Hand and temporal muscle wasting   HENT:   Head: Normocephalic.   Eyes: Pupils are equal, round, and reactive to light. No scleral icterus.   Cardiovascular: Normal rate, regular rhythm, normal heart sounds and intact  distal pulses.    Pulmonary/Chest: Effort normal. No respiratory distress. He has decreased breath sounds in the right lower field and the left lower field. He has no wheezes. He has no rhonchi. He has no rales.   Abdominal: Soft. Bowel sounds are normal. He exhibits distension. There is no tenderness.   Pain with palpation   Musculoskeletal: Normal range of motion. He exhibits edema (generalized).   Neurological: He is alert and oriented to person, place, and time.   Skin: Skin is warm and dry.   Psychiatric: He has a normal mood and affect. His behavior is normal. Judgment and thought content normal.   Nursing note and vitals reviewed.      Laboratory:  Immunosuppressants         Stop Route Frequency     cycloSPORINE modified capsule 125 mg      -- Oral 2 times daily     cycloSPORINE modified (NEORAL) 25 MG capsule      03/09 1714       cycloSPORINE modified (NEORAL) 100 MG capsule      03/09 1714       cycloSPORINE modified (NEORAL) 100 MG capsule      03/09 0514       cycloSPORINE modified (NEORAL) 25 MG capsule      03/09 0514          CBC:     Recent Labs  Lab 03/21/18  0500   WBC 6.55   RBC 2.50*   HGB 8.0*   HCT 25.2*   *   *   MCH 32.0*   MCHC 31.7*     CMP:     Recent Labs  Lab 03/21/18  0500      CALCIUM 9.3   ALBUMIN 2.9*   PROT 6.3      K 3.5   CO2 24      BUN 56*   CREATININE 3.3*   ALKPHOS 91   ALT 5*   AST 23   BILITOT 1.3*     Coagulation: No results for input(s): PT, INR, APTT in the last 168 hours.  Labs within the past 24 hours have been reviewed.    Diagnostic Results:  I have personally reviewed all pertinent imaging studies.

## 2018-03-21 NOTE — PLAN OF CARE
Problem: Patient Care Overview  Goal: Plan of Care Review  Outcome: Ongoing (interventions implemented as appropriate)  AAOx3, afebrile, c/o pain. Pain in abdomen. Pain controlled by prn pain medication and nighttime relief medication. Zofran x1. Nocturnal TPN/ lipids given and to be run for 12 hrs.  Pt able to tolerate apple slices. Pt refused nighttime reglan, colace, miralax, and heparin shot. Pt ambulated hallways with mother.  Dialysis maybe on hold today. Pt put out 310 urine thus far. Pt in lowest position, side rails up x2, non-skid foot wear in place, call light within reach, pt verbalized understanding to call RN when needed. Pt able to position self independently. Hand hygiene practiced per protocol. Will continue to monitor.

## 2018-03-21 NOTE — ASSESSMENT & PLAN NOTE
- Started on vanc/cefepime for ERCP 1/11 then transitioned to vanc/zosyn on 1/12 due to fevers, then transitioned to ertapenem 2/22, ertapenem was d/c'd 3/7 due to AMS changes, following ERCP 3/9, he was treated with 3 week course of flagyl/cefepime, which he completed on 3/20.   - thoracentesis 1/11, 1/18, 1/31, and 2/16--all without infection, cultures NGTD  - multiple blood cultures sent over admission, all NGTD  - cmv positive 2/1--see below for CMV information  - taken to OR 2/7 for exp lap - fluid collections drained, cultures NGTD.  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  - IR attempted drainage on fluid collection on 2/27, however, per IR,there was limited fluid interposed with bowel in the RLQ with no safe pocket seen for drainage.  - Tagged WBC scan 2/27, negative results.  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 EBV PCR negative. Adenovirus negative. Repeat CMV PCR negative.    - Remains afebrile since 3/10 following ERCP 3/9 with metal stent placement  - Bx with cholestatic hepatitis --> couldn't cx secondary to EUS contamination --> will cx next bx  - Plan to continue abx (flagyl/cefepime) x 14 days to treat cholangitis --> discussed with ID, plan to dc 1 antibiotic over 3 day period: d/c flagyl 3/19, d/c cefepime 3/20, and d/c isavu 3/21  - Bandemia noted on labs since 3/14. Continue to monitor.

## 2018-03-21 NOTE — SUBJECTIVE & OBJECTIVE
Interval History: afebrile, no overnight events, continues with abdominal pain and back pain    Review of Systems   Constitutional: Negative for chills and fever.   HENT: Negative for sore throat.    Respiratory: Negative for cough, chest tightness and shortness of breath.    Cardiovascular: Negative for chest pain, palpitations and leg swelling.   Gastrointestinal: Positive for abdominal distention and abdominal pain. Negative for diarrhea, nausea and vomiting.   Genitourinary: Negative for dysuria, flank pain and urgency.   Skin: Negative for rash.   Neurological: Negative for dizziness, light-headedness and numbness.   Psychiatric/Behavioral: Negative for confusion.     Objective:     Vital Signs (Most Recent):  Temp: 98.9 °F (37.2 °C) (03/21/18 1222)  Pulse: 97 (03/21/18 1222)  Resp: 18 (03/21/18 1222)  BP: (!) 154/93 (03/21/18 1222)  SpO2: 97 % (03/21/18 1222) Vital Signs (24h Range):  Temp:  [97.4 °F (36.3 °C)-99.3 °F (37.4 °C)] 98.9 °F (37.2 °C)  Pulse:  [84-99] 97  Resp:  [16-20] 18  SpO2:  [95 %-97 %] 97 %  BP: (149-161)/() 154/93     Weight: 71 kg (156 lb 8.4 oz)  Body mass index is 25.28 kg/m².    Estimated Creatinine Clearance: 30.1 mL/min (A) (based on SCr of 3.3 mg/dL (H)).    Physical Exam   Constitutional: He is oriented to person, place, and time. No distress.   HENT:   Head: Normocephalic and atraumatic.   Mouth/Throat: No oropharyngeal exudate.   Eyes: No scleral icterus.   Cardiovascular: Normal rate, regular rhythm and normal heart sounds.  Exam reveals no gallop and no friction rub.    No murmur heard.  Pulmonary/Chest: Effort normal and breath sounds normal. No respiratory distress. He has no wheezes. He has no rales.   Abdominal: He exhibits distension. There is tenderness.   Musculoskeletal: He exhibits no edema.   Neurological: He is alert and oriented to person, place, and time. No cranial nerve deficit.   Vitals reviewed.      Significant Labs:   Bilirubin:     Recent Labs  Lab  03/17/18  0530 03/18/18  0500 03/19/18  0500 03/20/18  0400 03/21/18  0500   BILITOT 1.4* 1.4* 1.5* 1.3* 1.3*     Blood Culture:     Recent Labs  Lab 03/03/18  1238 03/06/18  1631 03/06/18  1714 03/08/18  1648 03/08/18  1650   LABBLOO No growth after 5 days. No growth after 5 days. No growth after 5 days. No growth after 5 days. No growth after 5 days.     BMP:     Recent Labs  Lab 03/21/18  0500         K 3.5      CO2 24   BUN 56*   CREATININE 3.3*   CALCIUM 9.3   MG 2.1     CBC:     Recent Labs  Lab 03/20/18  0400 03/21/18  0500   WBC 5.85 6.55   HGB 7.8* 8.0*   HCT 24.1* 25.2*   PLT 88* 101*     CMP:     Recent Labs  Lab 03/20/18  0400 03/21/18  0500    139   K 3.6 3.5    105   CO2 23 24   GLU 89 102   BUN 39* 56*   CREATININE 2.5* 3.3*   CALCIUM 8.4* 9.3   PROT 5.7* 6.3   ALBUMIN 2.4* 2.9*   BILITOT 1.3* 1.3*   ALKPHOS 93 91   AST 30 23   ALT 6* 5*   ANIONGAP 11 10   EGFRNONAA 33.7* 24.1*     Microbiology Results (last 7 days)     Procedure Component Value Units Date/Time    AFB Culture & Smear [[id number]] Collected:  01/11/18 1335    Order Status:  Completed Specimen:  Pleural Fluid from Lung, Left Updated:  03/15/18 2127     AFB Culture & Smear No growth after 8 weeks.      AFB CULTURE STAIN No acid fast bacilli seen.    Fungus culture [[id number]] Collected:  03/06/18 1500    Order Status:  Completed Specimen:  Body Fluid from Peritoneal Fluid Updated:  03/15/18 1123     Fungus (Mycology) Culture Culture in progress          Significant Imaging: I have reviewed all pertinent imaging results/findings within the past 24 hours.

## 2018-03-21 NOTE — PT/OT/SLP PROGRESS
"Physical Therapy Treatment    Patient Name:  Jhonny Diana   MRN:  [id number]    Recommendations:     Discharge Recommendations:  outpatient PT   Discharge Equipment Recommendations: bath bench   Barriers to discharge: None    Assessment:     Jhonny Diana is a 27 y.o. male admitted with a medical diagnosis of Fever.  He presents with the following impairments/functional limitations:  weakness, impaired functional mobilty, gait instability, impaired endurance, impaired balance, impaired self care skills, impaired cardiopulmonary response to activity.  Pt tolerated treatment session c min c/o fatigue.  Pt demo decreased dynamic balance requiring CGA.  Pt amb 340 ft c RW S - mild unsteadiness noted, decreased gait speed, no LOB, no SOB.  Pt would benefit from continued skilled acute PT 4x/wk to improve functional mobility.      Rehab Prognosis:  good; patient would benefit from acute skilled PT services to address these deficits and reach maximum level of function.      Recent Surgery: Procedure(s) (LRB):  ERCP (N/A)  ULTRASOUND-ENDOSCOPIC-UPPER (N/A) 12 Days Post-Op    Plan:     During this hospitalization, patient to be seen 4 x/week to address the above listed problems via gait training, therapeutic activities, therapeutic exercises, neuromuscular re-education  · Plan of Care Expires:  04/05/18   Plan of Care Reviewed with: patient    Subjective     Communicated with RN and OT prior to session.  Patient found coming out from bathroom upon PT entry to room, agreeable to treatment.      Chief Complaint: fatigue  Patient comments/goals: "I barely slept last night."  Pain/Comfort:  · Pain Rating 1: 0/10    Patients cultural, spiritual, Jew conflicts given the current situation: none    Objective:     Patient found with: central line, telemetry     General Precautions: Standard, fall   Orthopedic Precautions:N/A   Braces: N/A     Functional Mobility:  · Gait: 340ft c RW S  · Balance: static standing (S); dynamic " standing (CGA)      AM-PAC 6 CLICK MOBILITY  Turning over in bed (including adjusting bedclothes, sheets and blankets)?: 4  Sitting down on and standing up from a chair with arms (e.g., wheelchair, bedside commode, etc.): 4  Moving from lying on back to sitting on the side of the bed?: 4  Moving to and from a bed to a chair (including a wheelchair)?: 3  Need to walk in hospital room?: 3  Climbing 3-5 steps with a railing?: 3  Total Score: 21       Therapeutic Activities and Exercises:  Educated on amb daily c assistance as needed; safety c mobility; performing BLE/BUE exercises throughout day  Amb 340ft c RW S  6MWT - 340ft = 103.6m -> 0.28 m/s  -Pt distracted 2x during test while talking to RN in jenkins; 1x stop as pt request to switch out RW  Functional reach and Rhomberg stance to challenge balance    Patient left up in chair with all lines intact, call button in reach, RN notified and NSG students present..    GOALS:    Physical Therapy Goals        Problem: Physical Therapy Goal    Goal Priority Disciplines Outcome Goal Variances Interventions   Physical Therapy Goal     PT/OT, PT Ongoing (interventions implemented as appropriate)     Description:  Goals to be met by: 3/19/18     Patient will increase functional independence with mobility by performin. Supine to sit with Modified Bowman. - GOAL MET  2. Sit to stand transfer with Minimal Assistance. - GOAL MET  3. Bed to chair transfer with Contact Guard Assistance using Rolling Walker. - GOAL MET  4. Gait  x 150 feet with Contact Guard Assistance using Rolling Walker. - GOAL MET    5. Sit to supine with Modified Bowman.  6. Lower extremity exercise program x 20 reps per handout, with assistance as needed.  7. Pt to perf 10x sit<>stand: 1 min 18 sec, to demonstrate improvements in B LE mm strength.  8. Pt to perf 6MWT: amb 566', to demonstrate a clinically significant improvement in aerobic capacity.    9. Pt to amb 150' with SBA, RW.                        Time Tracking:     PT Received On: 03/21/18  PT Start Time: 1410     PT Stop Time: 1433  PT Total Time (min): 23 min     Billable Minutes: Gait Training 13 min and Therapeutic Activity 10 min    Treatment Type: Treatment  PT/PTA: PT           William Yost, PT  03/21/2018

## 2018-03-21 NOTE — PROGRESS NOTES
[facility]  Nephrology  Progress Note    Patient Name: Jhonny Diana  MRN: [id number]  Admission Date: 1/11/2018  Hospital Length of Stay: 69 days  Attending Provider: Nathanael Medina MD   Primary Care Physician: Primary Doctor No  Principal Problem:Fever    Subjective:     HPI: Jhonny Diana is a 28 y/o  male with PMHx relevant for ETOH cirrhosis.  S/p DDLT 10/19/2017; c/b seizures (swtiched off prograf to cyclo), [facility]N dialysis dependant in iHD MWF, last HD on 1/10, anuric, superficial wound infection s/p wound vac to chevron incision, and multiple admissions for fevers finally dx with peritonitis in November (WBC 5000, 75% PNM) neg for bile leak. HE gets recurrent paracentesis and has peritoneal drain. Other pertinent PMH current wound vac in place 2/2 wound infection, malnutrition requiring TPN for short course and ongoing hypoalbuminemia, and seizure activity while on prograf and has since been switched to cyclosporine without reoccurrence.  He is admitted to LTx service secondary to fever, abdominal pain, and N/V. He reports fever (103) for 1 day prior. Overnight, he developed N/V, reports small amount of green emesis with new left sided pain. He also endorses worsening SOB with exertion. He was scheduled as an outpatient for follow up paracentesis and IR drainage of fluid collection. CXR in ER shows large pleural effusion with subsegmental atelectasis. His ANC is 900. Broad spectrum antibiotics initiated in ED. Nephrology consulted for LAURA dialysis dependant co management. He dialyses at [facility] under the care of Dr. Peterson via [facility].      Interval History: . Net pos 900 ml. Patient refused lasix 80 mg IV today as patient felt no increase UOP after lasix 80 mg IV given at noon yesterday.     Review of patient's allergies indicates:   Allergen Reactions    Bactrim [sulfamethoxazole-trimethoprim] Other (See Comments)     Mookie Trell Syndrome     Current  Facility-Administered Medications   Medication Frequency    0.9%  NaCl infusion PRN    acetaminophen tablet 650 mg Q6H PRN    albuterol-ipratropium 2.5mg-0.5mg/3mL nebulizer solution 3 mL Q4H PRN    bisacodyl EC tablet 10 mg Daily    bisacodyl suppository 10 mg Daily PRN    butalbital-acetaminophen-caffeine -40 mg per tablet 1 tablet Q4H PRN    cycloSPORINE modified capsule 125 mg BID    dextrose 50% injection 12.5 g PRN    dextrose 50% injection 25 g PRN    diphenhydrAMINE-zinc acetate 1-0.1% cream TID PRN    docusate sodium capsule 100 mg BID    dronabinol capsule 2.5 mg QHS    epoetin maurisio injection 10,000 Units Every Mon, Wed, Fri    ergocalciferol capsule 50,000 Units Q7 Days    fat emulsion 20% infusion 250 mL Once    furosemide injection 80 mg Once    glucagon (human recombinant) injection 1 mg PRN    glucose chewable tablet 16 g PRN    glucose chewable tablet 24 g PRN    heparin (porcine) injection 1,000 Units PRN    heparin (porcine) injection 5,000 Units Q8H    isavuconazonium sulfate 372 mg in sodium chloride 0.9% 250 mL Q24H    levetiracetam oral soln Soln 500 mg BID    levothyroxine tablet 75 mcg Before breakfast    metoclopramide HCl 5 mg/5 mL solution 10 mg QID (AC & HS)    multivitamin tablet 1 tablet Daily    naloxone 0.4 mg/mL injection 0.2 mg PRN    omeprazole capsule 40 mg QAM    ondansetron disintegrating tablet 8 mg Q6H PRN    ondansetron injection 4 mg Q6H PRN    oxyCODONE immediate release tablet 5 mg Q4H PRN    oxyCODONE immediate release tablet 5 mg Nightly PRN    polyethylene glycol packet 17 g BID    predniSONE tablet 20 mg Daily    prochlorperazine injection Soln 10 mg Q6H PRN    psyllium husk (aspartame) 3.4 gram PwPk 1 packet BID    simethicone chewable tablet 80 mg TID PRN    sodium chloride 0.9% flush 3 mL PRN    sodium chloride 0.9% flush 3 mL PRN    TPN ADULT CENTRAL LINE CUSTOM Continuous    TPN ADULT CENTRAL LINE CUSTOM Continuous     ursodiol oral suspension (conc: 60 mg/mL) 300 mg BID       Objective:     Vital Signs (Most Recent):  Temp: 98.6 °F (37 °C) (03/21/18 0722)  Pulse: 99 (03/21/18 0722)  Resp: 16 (03/21/18 0722)  BP: (!) 149/92 (03/21/18 0722)  SpO2: 95 % (03/21/18 0722)  O2 Device (Oxygen Therapy): room air (03/21/18 0722) Vital Signs (24h Range):  Temp:  [97.4 °F (36.3 °C)-99.3 °F (37.4 °C)] 98.6 °F (37 °C)  Pulse:  [84-99] 99  Resp:  [16-20] 16  SpO2:  [95 %-97 %] 95 %  BP: (149-161)/() 149/92     Weight: 71 kg (156 lb 8.4 oz) (03/21/18 0400)  Body mass index is 25.28 kg/m².  Body surface area is 1.82 meters squared.    I/O last 3 completed shifts:  In: 2747.6 [P.O.:505; I.V.:50; IV Piggyback:250]  Out: 1235 [Urine:1235]    Physical Exam   Constitutional: He is oriented to person, place, and time.   HENT:   Head: Atraumatic.   Mouth/Throat: No oropharyngeal exudate.   Mild facial edema   Eyes: No scleral icterus.   Cardiovascular: Normal rate and regular rhythm.    Pulmonary/Chest: Effort normal and breath sounds normal. No respiratory distress. He has no wheezes. He has no rales.   Abdominal: Soft. There is no tenderness. There is no rebound.   acites   Musculoskeletal: Edema: mild BLE edema.   Lymphadenopathy:     He has no cervical adenopathy.   Neurological: He is alert and oriented to person, place, and time.   Skin: He is not diaphoretic.       Significant Labs:  All labs within the past 24 hours have been reviewed.     Significant Imaging:  Labs: Reviewed    Assessment/Plan:     Acute renal failure with tubular necrosis    LAURA dialysisi dependant since 1016/2017  HD on [facility] under the care of Dr. Peterson  On HD for: 2.5 mo since 10/16/2017  Duration of outpatient dialysis session - 3.5 hrs  EDW - TBD  Residual Renal Function - yes   -Increase in UOP over weekend. Cr 2.5>3.3. (delta change 0.7>0.8).   -Hold HD today. Strict IO. Daily Weights.   -Recommend lasix 120 IV x 1 and watch first 1-3 hrs for increase  UOP. If respond to lasix, then repeat lasix 120 mg then evening. Discussed above with primary team.     Anemia of CKD  -Increase Epogen by 25%. Please give even if not going to dialysis.                      Thank you for your consult. I will follow-up with patient. Please contact us if you have any additional questions.    Kay Guillen NP  Nephrology  [facility]

## 2018-03-21 NOTE — PLAN OF CARE
Problem: Occupational Therapy Goal  Goal: Occupational Therapy Goal  Goals to be met by:  2 Weeks (3/24/2018)    Patient will increase functional independence with ADLs by performin. Supine to sit with Supervision.--met  2. Sit to Stand transfers with Supervision- met 3/21/18  3. Toilet transfer to toilet with Supervision- met 3/21/18  4. Grooming while standing with Supervision-met 3/21/18 (using sink/walker to stabilize  5. UE Dressing with Supervision.- met 3/12/18 Revised 3-21-18: UE dress with spvn in standing   6. LE Dressing with Minimal Assistance-met 3/12/18, revised 3/21/18: LE dress with spvn standing  Added goals on 3/21/18:  1)increase dynamic balance to grasp 5/5 ADL items from closet/above shoulder level without UE support with spvn         Outcome: Ongoing (interventions implemented as appropriate)  con't with established and revised goals.  Zulma Renteria, OTR

## 2018-03-22 LAB
ALBUMIN SERPL BCP-MCNC: 2.7 G/DL
ALP SERPL-CCNC: 97 U/L
ALT SERPL W/O P-5'-P-CCNC: 5 U/L
ANION GAP SERPL CALC-SCNC: 13 MMOL/L
ANISOCYTOSIS BLD QL SMEAR: SLIGHT
AST SERPL-CCNC: 23 U/L
BASO STIPL BLD QL SMEAR: ABNORMAL
BASOPHILS NFR BLD: 1 %
BILIRUB SERPL-MCNC: 1.1 MG/DL
BUN SERPL-MCNC: 74 MG/DL
CALCIUM SERPL-MCNC: 9 MG/DL
CHLORIDE SERPL-SCNC: 104 MMOL/L
CO2 SERPL-SCNC: 23 MMOL/L
CREAT SERPL-MCNC: 3.7 MG/DL
CREAT UR-MCNC: 31 MG/DL
CYCLOSPORINE BLD LC/MS/MS-MCNC: 187 NG/ML
DACRYOCYTES BLD QL SMEAR: ABNORMAL
DIFFERENTIAL METHOD: ABNORMAL
DOHLE BOD BLD QL SMEAR: PRESENT
EOSINOPHIL NFR BLD: 0 %
ERYTHROCYTE [DISTWIDTH] IN BLOOD BY AUTOMATED COUNT: 18.6 %
EST. GFR  (AFRICAN AMERICAN): 24.2 ML/MIN/1.73 M^2
EST. GFR  (NON AFRICAN AMERICAN): 21 ML/MIN/1.73 M^2
FUNGUS SPEC CULT: NORMAL
GLUCOSE SERPL-MCNC: 94 MG/DL
HCT VFR BLD AUTO: 24.4 %
HGB BLD-MCNC: 7.9 G/DL
HYPOCHROMIA BLD QL SMEAR: ABNORMAL
IMM GRANULOCYTES # BLD AUTO: ABNORMAL K/UL
IMM GRANULOCYTES NFR BLD AUTO: ABNORMAL %
LYMPHOCYTES NFR BLD: 14 %
MAGNESIUM SERPL-MCNC: 2.1 MG/DL
MCH RBC QN AUTO: 32.8 PG
MCHC RBC AUTO-ENTMCNC: 32.4 G/DL
MCV RBC AUTO: 101 FL
METAMYELOCYTES NFR BLD MANUAL: 1 %
MONOCYTES NFR BLD: 4 %
MYELOCYTES NFR BLD MANUAL: 1 %
NEUTROPHILS NFR BLD: 70 %
NEUTS BAND NFR BLD MANUAL: 9 %
NRBC BLD-RTO: 0 /100 WBC
OVALOCYTES BLD QL SMEAR: ABNORMAL
PHOSPHATE SERPL-MCNC: 3.6 MG/DL
PLATELET # BLD AUTO: 95 K/UL
PLATELET BLD QL SMEAR: ABNORMAL
PMV BLD AUTO: 12.4 FL
POIKILOCYTOSIS BLD QL SMEAR: SLIGHT
POLYCHROMASIA BLD QL SMEAR: ABNORMAL
POTASSIUM SERPL-SCNC: 3.7 MMOL/L
PROT SERPL-MCNC: 5.8 G/DL
RBC # BLD AUTO: 2.41 M/UL
SCHISTOCYTES BLD QL SMEAR: ABNORMAL
SCHISTOCYTES BLD QL SMEAR: PRESENT
SODIUM SERPL-SCNC: 140 MMOL/L
WBC # BLD AUTO: 6.15 K/UL

## 2018-03-22 PROCEDURE — 97530 THERAPEUTIC ACTIVITIES: CPT

## 2018-03-22 PROCEDURE — 82570 ASSAY OF URINE CREATININE: CPT

## 2018-03-22 PROCEDURE — 25000003 PHARM REV CODE 250: Performed by: NURSE PRACTITIONER

## 2018-03-22 PROCEDURE — 99232 SBSQ HOSP IP/OBS MODERATE 35: CPT | Mod: ,,, | Performed by: INTERNAL MEDICINE

## 2018-03-22 PROCEDURE — A4217 STERILE WATER/SALINE, 500 ML: HCPCS | Performed by: NURSE PRACTITIONER

## 2018-03-22 PROCEDURE — 99233 SBSQ HOSP IP/OBS HIGH 50: CPT | Mod: ,,, | Performed by: NURSE PRACTITIONER

## 2018-03-22 PROCEDURE — 63600175 PHARM REV CODE 636 W HCPCS: Performed by: PHYSICIAN ASSISTANT

## 2018-03-22 PROCEDURE — 84100 ASSAY OF PHOSPHORUS: CPT

## 2018-03-22 PROCEDURE — 97116 GAIT TRAINING THERAPY: CPT

## 2018-03-22 PROCEDURE — 85007 BL SMEAR W/DIFF WBC COUNT: CPT

## 2018-03-22 PROCEDURE — 25000003 PHARM REV CODE 250: Performed by: PHYSICIAN ASSISTANT

## 2018-03-22 PROCEDURE — 83735 ASSAY OF MAGNESIUM: CPT

## 2018-03-22 PROCEDURE — B4185 PARENTERAL SOL 10 GM LIPIDS: HCPCS | Performed by: NURSE PRACTITIONER

## 2018-03-22 PROCEDURE — 85027 COMPLETE CBC AUTOMATED: CPT

## 2018-03-22 PROCEDURE — 80158 DRUG ASSAY CYCLOSPORINE: CPT

## 2018-03-22 PROCEDURE — 20600001 HC STEP DOWN PRIVATE ROOM

## 2018-03-22 PROCEDURE — 80053 COMPREHEN METABOLIC PANEL: CPT

## 2018-03-22 PROCEDURE — 63600175 PHARM REV CODE 636 W HCPCS: Performed by: NURSE PRACTITIONER

## 2018-03-22 RX ADMIN — SOYBEAN OIL 250 ML: 20 INJECTION, SOLUTION INTRAVENOUS at 11:03

## 2018-03-22 RX ADMIN — OXYCODONE HYDROCHLORIDE 5 MG: 5 TABLET ORAL at 11:03

## 2018-03-22 RX ADMIN — METOCLOPRAMIDE HYDROCHLORIDE 10 MG: 5 SOLUTION ORAL at 05:03

## 2018-03-22 RX ADMIN — OXYCODONE HYDROCHLORIDE 5 MG: 5 TABLET ORAL at 07:03

## 2018-03-22 RX ADMIN — ONDANSETRON HYDROCHLORIDE 4 MG: 2 INJECTION, SOLUTION INTRAMUSCULAR; INTRAVENOUS at 10:03

## 2018-03-22 RX ADMIN — LEVETIRACETAM 500 MG: 100 SOLUTION ORAL at 08:03

## 2018-03-22 RX ADMIN — OXYCODONE HYDROCHLORIDE 5 MG: 5 TABLET ORAL at 03:03

## 2018-03-22 RX ADMIN — CYCLOSPORINE 125 MG: 100 CAPSULE, LIQUID FILLED ORAL at 05:03

## 2018-03-22 RX ADMIN — LEVETIRACETAM 500 MG: 100 SOLUTION ORAL at 11:03

## 2018-03-22 RX ADMIN — LEVOTHYROXINE SODIUM 75 MCG: 75 TABLET ORAL at 05:03

## 2018-03-22 RX ADMIN — CYCLOSPORINE 125 MG: 100 CAPSULE, LIQUID FILLED ORAL at 08:03

## 2018-03-22 RX ADMIN — OXYCODONE HYDROCHLORIDE 5 MG: 5 TABLET ORAL at 10:03

## 2018-03-22 RX ADMIN — ERGOCALCIFEROL 50000 UNITS: 1.25 CAPSULE ORAL at 08:03

## 2018-03-22 RX ADMIN — DOCUSATE SODIUM 100 MG: 100 CAPSULE, LIQUID FILLED ORAL at 10:03

## 2018-03-22 RX ADMIN — MAGNESIUM SULFATE HEPTAHYDRATE: 500 INJECTION, SOLUTION INTRAMUSCULAR; INTRAVENOUS at 11:03

## 2018-03-22 RX ADMIN — OMEPRAZOLE 40 MG: 40 CAPSULE, DELAYED RELEASE ORAL at 07:03

## 2018-03-22 RX ADMIN — BISACODYL 10 MG: 5 TABLET, COATED ORAL at 08:03

## 2018-03-22 RX ADMIN — DRONABINOL 2.5 MG: 2.5 CAPSULE ORAL at 11:03

## 2018-03-22 RX ADMIN — DOCUSATE SODIUM 100 MG: 100 CAPSULE, LIQUID FILLED ORAL at 08:03

## 2018-03-22 RX ADMIN — URSODIOL 300 MG: 300 CAPSULE ORAL at 11:03

## 2018-03-22 RX ADMIN — PREDNISONE 20 MG: 10 TABLET ORAL at 08:03

## 2018-03-22 RX ADMIN — URSODIOL 300 MG: 300 CAPSULE ORAL at 08:03

## 2018-03-22 RX ADMIN — METOCLOPRAMIDE HYDROCHLORIDE 10 MG: 5 SOLUTION ORAL at 10:03

## 2018-03-22 NOTE — ASSESSMENT & PLAN NOTE
LAURA dialysisi dependant since 1016/2017  HD on [facility] under the care of Dr. Peterson  On HD for: 2.5 mo since 10/16/2017  Duration of outpatient dialysis session - 3.5 hrs  EDW - TBD  Residual Renal Function - yes   -Increase in UOP over weekend- ml/24 hrs without diuretics. Cr 2.5>3.3>3.7 last 3 days..    -Hold HD today. Strict IO. Daily Weights.   -24 hr creatinine clearance.   -Can start torsemide tomorrow after 24 hr CrCl collection completion   -If shortness of breath, okay to give torsemide even if 24 hr CrCl collection not completed. Please call Nephrology with any questions.     Anemia of CKD  -Increase Epogen by 25%. Please give even if not going to dialysis.

## 2018-03-22 NOTE — PROGRESS NOTES
[facility]  Liver Transplant  Progress Note    Patient Name: Jhonny Diana  MRN: [id number]  Admission Date: 2018  Hospital Length of Stay: 70 days  Code Status: Full Code  Primary Care Provider: Primary Doctor No  Post-Operative Day: 154    ORGAN:   LIVER  Disease Etiology: Acute Alcoholic Hepatitis  Donor Type:    - Brain Death  CDC High Risk:   No  Donor CMV Status:   Donor CMV Status: Positive  Donor HBcAB:   Negative  Donor HCV Status:   Negative  Whole or Partial: Whole Liver  Biliary Anastomosis: End to End  Arterial Anatomy: Standard  Subjective:     History of Present Illness:  Jhonny Diana is a 26 y/o male with past medical history of alcoholic cirrhosis.  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 Found to have peritonitis in November (WBC 5000, 75% PNM) neg for bile leak. He was treated initially with vanc/cefepime. Repeat cell counts  with some improvement (WBC 1400, 45% PNM). He has undergone multiple paracenteses as well as abscess drainage of perihepatic fluid collections and treated with antimicrobial therapy but no positive cultures. Of note, biliary stricture also identified and ERCP performed on 2017 with sphincterotomy and biliary stent placed. Liver tests still have not normalized despite intervention, bilirubin and AP remain elevated. Other pertinent PMH current wound vac in place 2/2 wound infection, malnutrition requiring TPN for short course and ongoing hypoalbuminemia, and seizure activity while on prograf and has since been switched to cyclosporine without reoccurrence.  He presented to the ER for fever, abdominal pain, and N/V. He reports fever (103) for 1  day prior. Overnight, he developed N/V, reports small amount of green emesis with new left sided pain. He also endorses worsening SOB with exertion. He was scheduled as an outpatient for follow up paracentesis and IR drainage of fluid collection. CXR in ER shows large pleural effusion with subsegmental atelectasis. Infectious work up initiated in ER. His ANC is 900. Broad spectrum antibiotics initiated in ED. At admit, he denied chest pain, palpitations, diarrhea, constipation, back pain, or any sick contacts.    Hospital Course:  Unclear cause of ongoing fevers. Multiple infectious, diagnostic testing completed. Taken to OR for ex lap 2/6, loculated fluid collections found, cultures NGTD. Multiple blood cultures with NGTD. Thoracentesis 1/11, 1/18, 1/31, and 2/16, cultures NGTD. Paracentesis 1/11, 1/19, and 2/5, cultures NGTD. negative fungal markers/quant gold intermediate. Negative TTE.  IR drainage 1/11, cultures NGTD. Continued with intermittent fevers despite broad spectrum antibiotics that were initially started 1/11, then broadened antibiotics 2/22 from vanc/zosyn to ertapenem. Multiple CT scan of abd obtained that were unremarkable. Tagged WBCs scan 2/27 without source of infection found. He had several episodes of neutropenia requiring neupogen, last given 2/28. 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 Monitoring weekly CMV PCR. Chimerism studies obtained 3/1 to assess for GVHD, which was negative, EBV PCR negative, adenovirus negative. In addition to fevers, he has consistently c/o abdominal pain and N/V. Tbili has remain elevated post-op requiring multiple ERCPs. ERCPs performed this admission on 1/30, 2/19, and 3/9. ERCPs with sludge and stones with stents placed. Last ERCP 3/9 with metal stent placed into CBD and stones removed. His ertapenem was d/cd 3/7 due to AMS. Following ERCP 3/9, he was started on cefepime and flagyl  for possible cholangitis. He has remained afebrile since 3/10. He completed cefepime/flagyl after 3 weeks on 3/19 & 3/20. Due to elevated enzymes, he had a liver biopsy 1/23 consistent with cholestatic hepatitis (no cultures obtained during biopsy). GI was consulted for persistent N/V and malnutrition. EGD performed 1/25 and 3/6, EGD 3/6 with normal esophagus congested, erythematous and nodular mucosa in the stomach, one duodenal ulcer with a clean ulcer base (Emir Class III), biopsies taken, negative for CMV and GVHD. Due to N/V and abdominal pain, multiple attempts were made to place SHANE tube with tube feeding, but patient did not tolerate TF. Therefore, he was transitioned to TPN 1/16 then again on 2/24. He also has chronic constipation. Small bowel follow through study with normal findings. Following EGD 1/25, he was started on a PPI, miralax, and Metamucil, but he did not tolerate meds. For appetite stimulation he was trialed on marinol. He also did well with reglan before meals and at bedtime. Early post-op liver transplant surgery, he was switched to cyclosporine for seizures. During this admission, he was transitioned back to prograf 2/24 with keppra on board. Starting 3/1 he developed stuttering speech, disorientation. CT head 3/1 and MRI head 3/6 unremarkable. EEG 3/8 with no seizure activity. He started on rapamune and stress dose steroids 3/10, then ultimately returned to cyclosporine 3/13 and rapamune d/cd same day. Since transitioning back to cyclosporine, his mental status has remained stable. During admission, patient was evaluated by [facility] for future kidney transplant, and he was deemed to be a suitable candidate once medically stable and cleared. He dialyzes on a M-W-F schedule. He was anuric on admission, but starting producing urine around 3/16 and continues to produce around 800-900cc daily.     Interval History: no acute events overnight. Slowly trying to improve caloric intake, still  intermittently nauseated but appears to be slightly improved. Continue nocturnal TPN/Lipids for now. Continue reglan and marinol. No diuretics yesterday, 950ml uop/day, nephrology following, ordered 24 hour CrCl, will start diamox tomorrow after 24 hour urine collection. Skipped HD yest, delta Cr 0.4. Tbili 1.1, lowest since transplant. Mood appears to be improving.     Scheduled Meds:   bisacodyl  10 mg Oral Daily    cycloSPORINE modified  125 mg Oral BID    docusate sodium  100 mg Oral BID    dronabinol  2.5 mg Oral QHS    epoetin maurisio (PROCRIT) injection  12,600 Units Intravenous Every Mon, Wed, Fri    ergocalciferol  50,000 Units Oral Q7 Days    fat emulsion 20%  250 mL Intravenous Once    heparin (porcine)  5,000 Units Subcutaneous Q8H    levetiracetam oral soln  500 mg Oral BID    levothyroxine  75 mcg Oral Before breakfast    metoclopramide HCl  10 mg Oral QID (AC & HS)    multivitamin  1 tablet Oral Daily    omeprazole  40 mg Oral QAM    polyethylene glycol  17 g Oral BID    [START ON 3/23/2018] predniSONE  15 mg Oral Daily    psyllium husk (aspartame)  3.4 g Oral BID    ursodiol  300 mg Oral BID     Continuous Infusions:   TPN ADULT CENTRAL LINE CUSTOM Stopped (03/22/18 1023)    TPN ADULT CENTRAL LINE CUSTOM       PRN Meds:sodium chloride 0.9%, acetaminophen, albuterol-ipratropium 2.5mg-0.5mg/3mL, bisacodyl, butalbital-acetaminophen-caffeine -40 mg, dextrose 50%, dextrose 50%, diphenhydrAMINE-zinc acetate 1-0.1%, glucagon (human recombinant), glucose, glucose, heparin (porcine), naloxone, ondansetron, ondansetron, oxyCODONE, oxyCODONE, prochlorperazine, simethicone, sodium chloride 0.9%, sodium chloride 0.9%    Review of Systems   Constitutional: Positive for activity change, appetite change and fatigue. Negative for fever.   HENT: Negative.  Negative for facial swelling.    Eyes: Negative.    Respiratory: Negative.  Negative for apnea, chest tightness, shortness of breath and  wheezing.    Cardiovascular: Negative.  Negative for chest pain, palpitations and leg swelling.   Gastrointestinal: Positive for abdominal distention, abdominal pain and constipation. Negative for diarrhea, nausea and vomiting.   Genitourinary: Positive for decreased urine volume. Negative for difficulty urinating, dysuria, hematuria and urgency.   Musculoskeletal: Negative.  Negative for back pain, gait problem, neck pain and neck stiffness.   Skin: Negative.  Negative for color change and pallor.   Allergic/Immunologic: Positive for immunocompromised state.   Neurological: Negative.  Negative for dizziness, seizures, weakness, light-headedness and headaches.   Psychiatric/Behavioral: Positive for dysphoric mood. Negative for behavioral problems, confusion, hallucinations, sleep disturbance and suicidal ideas. The patient is not nervous/anxious.      Objective:     Vital Signs (Most Recent):  Temp: 98.4 °F (36.9 °C) (03/22/18 0721)  Pulse: 92 (03/22/18 0721)  Resp: 14 (03/22/18 0721)  BP: (!) 151/94 (03/22/18 0721)  SpO2: 97 % (03/22/18 0721) Vital Signs (24h Range):  Temp:  [98.4 °F (36.9 °C)-99.1 °F (37.3 °C)] 98.4 °F (36.9 °C)  Pulse:  [89-93] 92  Resp:  [14-18] 14  SpO2:  [95 %-99 %] 97 %  BP: (146-160)/(90-94) 151/94     Weight: 72.1 kg (158 lb 15.2 oz) (standing)  Body mass index is 25.67 kg/m².    Intake/Output - Last 3 Shifts       03/20 0700 - 03/21 0659 03/21 0700 - 03/22 0659 03/22 0700 - 03/23 0659    P.O. 325 740 200    I.V. (mL/kg) 50 (0.7) 0 (0) 0 (0)    Other 0 0 0    IV Piggyback 250      TPN 1089.3 1243.6 430.7    Total Intake(mL/kg) 1714.3 (24.1) 1983.6 (27.5) 630.7 (8.7)    Urine (mL/kg/hr) 810 (0.5) 950 (0.5) 400 (1)    Emesis/NG output 0 (0) 0 (0)     Other 0 (0) 0 (0)     Stool 0 (0) 0 (0) 0 (0)    Blood 0 (0) 0 (0)     Total Output 810 950 400    Net +904.3 +1033.6 +230.7           Urine Occurrence 0 x 0 x 0 x    Stool Occurrence 3 x 0 x 0 x    Emesis Occurrence 0 x 0 x           Physical  Exam   Constitutional: He is oriented to person, place, and time. He appears well-developed.   Hand and temporal muscle wasting   HENT:   Head: Normocephalic.   Eyes: Pupils are equal, round, and reactive to light. No scleral icterus.   Cardiovascular: Normal rate, regular rhythm, normal heart sounds and intact distal pulses.    Pulmonary/Chest: Effort normal. No respiratory distress. He has decreased breath sounds in the right lower field and the left lower field. He has no wheezes. He has no rhonchi. He has no rales.   Abdominal: Soft. Bowel sounds are normal. He exhibits distension. There is tenderness in the right upper quadrant.   Musculoskeletal: Normal range of motion. He exhibits edema (generalized).   Neurological: He is alert and oriented to person, place, and time.   Skin: Skin is warm and dry.   Psychiatric: He has a normal mood and affect. His behavior is normal. Judgment and thought content normal.   Nursing note and vitals reviewed.      Laboratory:  Immunosuppressants         Stop Route Frequency     cycloSPORINE modified capsule 125 mg      -- Oral 2 times daily     cycloSPORINE modified (NEORAL) 25 MG capsule      03/09 1714       cycloSPORINE modified (NEORAL) 100 MG capsule      03/09 1714       cycloSPORINE modified (NEORAL) 100 MG capsule      03/09 0514       cycloSPORINE modified (NEORAL) 25 MG capsule      03/09 0514          CBC:   Recent Labs  Lab 03/22/18  0500   WBC 6.15   RBC 2.41*   HGB 7.9*   HCT 24.4*   PLT 95*   *   MCH 32.8*   MCHC 32.4     CMP:   Recent Labs  Lab 03/22/18  0500   GLU 94   CALCIUM 9.0   ALBUMIN 2.7*   PROT 5.8*      K 3.7   CO2 23      BUN 74*   CREATININE 3.7*   ALKPHOS 97   ALT 5*   AST 23   BILITOT 1.1*     Coagulation: No results for input(s): PT, INR, APTT in the last 168 hours.  Labs within the past 24 hours have been reviewed.    Diagnostic Results:  I have personally reviewed all pertinent imaging studies.    Assessment/Plan:     Severe  protein-calorie malnutrition    - Poor PO intake since transplant requiring short course of TPN during previous hospital stay.   - Due to N/V and abdominal pain, multiple attempts were made to place SHANE tube with tube feeding, but patient did not tolerate TF  - Transitioned to TPN 1/16 then again on 2/24 due to poor tolerance of SHANE tube/TF  - prealbumin 7 on 1/15 --> prealbumin 22 on 3/19, monitoring weekly (Mondays)  - EGD performed 1/25 and 3/6, gastroparesis, duodenal ulcer, and congested/erythematous gastric mucosa  - Small bowel through with normal findings.  - Consulted GI for GJ tube placement, but was canceled due to ongoing fevers  - Hold Reglan and Marinol 3/8 for neuro symptoms --> restarted reglan and marinol 3/19  - set goals for caloric intake with patient, will try nocturnal TPN started 3/20 PM to encourage appetite during the day        Acute renal failure with tubular necrosis    - HD resumed on previous admission. Anuric on admission and dialyzes M-W-F.  - Nephrology following.    - Goal is for kidney txp in future- currently not a candidate due to health status.  - urine output has improved over last several days since 3/16, around 900ml uop daily.    - HD held 3/21, trialed lasix but patient refused lasix, prefers diamox  - 24 hour CrCl in progress. Plan to start diamox tomorrow after 24h collection.         Biliary stricture of transplanted liver    - ERCP 12/6 with post-anastomosis stricture with stent placement.  - Tbili with increase prompting ERCP 1/30 with stones and sludge, stents placed.  - Repeat ERCP 2/19 as was having recurrent fevers with bilirubin stuck at 2.0-2.2 with overall unremarkable findings seen. Did place new larger stents.   - ERCP/EUS 3/9 with metal stent placed in bile duct, stones removed  - Tbili remains stable. Continue actigall.        Nausea and vomiting    - h/o constipation, abdominal pain, and N/V  - worsened with SHANE tube and TF --> now on TPN  - encourage bowel  regimen  - EGD performed 1/25 and 3/6, gastroparesis, duodenal ulcer, and congested/erythematous gastric mucosa  - Small bowel through with normal findings  - Continue reglan and marinol scheduled, anti-emetics PRN.   - If cont with GI symptoms then may need to consider Flex sig/ C-scope to further eval.         Recurrent pleural effusion on right    - CXR in ER with large right pleural effusion with subjective c/o worsening SOB.  - Multiple Thora's this admission, all non-infectious: 1/11, 1/19, 1/31, 2/16  - CT A/P 2/26 with large R pleural effusion, may need repeat thora if fevers reoccur.    - complaining of sob, obtain chest xray, trial dose of diamox 3/14 with minimal UOP  - chest xray with large pleural effusion 3/13 --> may need thora soon if becomes symptomatic or develops fever  - Fluid removal with HD        Liver transplanted    - Post op course complicated by fevers and hyperbilirubinemia, AST/ALT stable.  - see biliary stricture of transplanted liver  - Liver biopsy 1/23 without rejection.  - Paracentesis 3/6 negative for infection, wbc 28 segs 3%.  - ERCP, EUS biopsy 3/9/18 - no rejection, mild cholestatic hepatitis seen.         Fever    - Started on vanc/cefepime for ERCP 1/11 then transitioned to vanc/zosyn on 1/12 due to fevers, then transitioned to ertapenem 2/22, ertapenem was d/c'd 3/7 due to AMS changes, following ERCP 3/9, he was treated with 3 week course of flagyl/cefepime, which he completed on 3/20.   - thoracentesis 1/11, 1/18, 1/31, and 2/16--all without infection, cultures NGTD  - multiple blood cultures sent over admission, all NGTD  - cmv positive 2/1--see below for CMV information  - taken to OR 2/7 for exp lap - fluid collections drained, cultures NGTD.  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  - IR attempted drainage on fluid collection on 2/27, however, per IR,there was limited fluid  interposed with bowel in the RLQ with no safe pocket seen for drainage.  - Tagged WBC scan 2/27, negative results.  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 EBV PCR negative. Adenovirus negative. Repeat CMV PCR negative.    - Remains afebrile since 3/10 following ERCP 3/9 with metal stent placement  - Bx with cholestatic hepatitis --> couldn't cx secondary to EUS contamination --> will cx next bx  - Plan to continue abx (flagyl/cefepime) x 14 days to treat cholangitis --> discussed with ID, plan to dc 1 antibiotic over 3 day period: d/c flagyl 3/19, d/c cefepime 3/20, and d/c isavu 3/21  - Bandemia noted on labs since 3/14. Continue to monitor.        Vitamin D deficiency    - Continue ergo 50K weekly.         Kidney transplant candidate              Other cytomegaloviral diseases    - detected at 370 on CMV PCR 2/1.  - case discussed with ID and will hold off on treatment at this time given low WBC.   - undetected CMV PCR 2/8, 2/15, 2/22, 3/8   - continue Valcyte 200 mg every Mon, Wed, Fri.  - Per ID, valcyte dose decreased to ppx dose on 2/18--Valcyte d/c'd 2/25  - Repeat scope 3/6 to assess persistent N/V and possible CMV causing these symptoms. Biopsies thus far normal, cmv stains negative.  - continue to monitor weekly CMV PCR's, last PCR neg from 3/15.        Constipation    - chronic  - encourage ambulation, stool softeners and laxatives, often refuses some meds 2/2 nausea, will often require suppository or brown bomb enema to have a BM  - Small bowel follow through with normal findings        Anemia of chronic disease    - 1 U PRBC 3/12  - Monitor daily labs. H/H stable, transfuse PRN.         Delayed surgical wound healing    - Wd vac removed 1/12/18.    - Wound healing well.   - Changing dressing q Tuesday, last 3/20        Seizure    - 3/1, patient with stuttering speech, trouble recalling specific words when speaking. Does have hx of seizure (on keppra) previously while on prograf. Switched  to prograf over the weekend from cyclosporine. Neuro exam unremarkable.   - CT head without contrast obtained, unremarkable.   - In light of these symptoms, prograf d/c, restart cyclosporine 3/1.  - remains w/o seizure. Cont to have stutter/garbled speech/difficulty finding words.  Worse post EGD as sedation was given.  - MRI 3/6 unremarkable.  - EEG completed 3/8 - mild, generalized, non-specific cerebral dysfunction, no epileptiform activity  - monitor closely. VSS.        At risk for opportunistic infections    - see other cytomegaloviral disease  - Pentam given 2/24.  - D/C Isavu 3/21        Long-term use of immunosuppressant medication    - Maintenance IS with cyclosporine --> transitioned to Prograf 2/24 for continued nausea but did not tolerate from neuro standpoint.  PT IS NOT A CANDIDATE FOR TACROLIMUS.    - Transitioned back to cyclosporine 3/1  - D/c cyclosporine, started rapa & stress dose steroids 3/10 for continued confusion  - Mental status now improved, re-trial cyclosporine 3/13, d/c rapa 3/13  - Transition back to prednisone taper from stress dose steroids 3/15 --> will decrease by 5 mg q week.        Prophylactic immunotherapy    - See long term use of immunosuppression.             VTE Risk Mitigation         Ordered     heparin (porcine) injection 1,000 Units  As needed (PRN)     Route:  Intra-Catheter        03/12/18 1134     heparin (porcine) injection 5,000 Units  Every 8 hours     Route:  Subcutaneous        03/08/18 1622     Medium Risk of VTE  Once      01/11/18 0351     Place sequential compression device  Until discontinued      01/11/18 0351          The patients clinical status was discussed at multidisplinary rounds, involving transplant surgery, transplant medicine, pharmacy, nursing, nutrition, and social work    Discharge Planning: not stable for discharge at this time. Dc pending improvement in nutritional intake.  Monitor kidney function --> may need outpt HD  Monitor HH needs  vs rehab for deconditioned status      Shereen Mcguire, MATILDE  Liver Transplant  [facility]

## 2018-03-22 NOTE — PLAN OF CARE
Problem: Patient Care Overview  Goal: Plan of Care Review  Outcome: Ongoing (interventions implemented as appropriate)  AAOx3, afebrile, c/o pain. Pain controlled by prn pain medication. Nocturnal TPN/ lipids administered. Pt continuing to eat apples and tolerating. Pt in lowest position, side rails up x2, non-skid foot wear in place, call light within reach, pt verbalized understanding to call RN when needed. Hand hygiene practiced per protocol. Pt able to position self independently. Mother at bedside. Will continue to monitor.

## 2018-03-22 NOTE — PLAN OF CARE
Problem: Physical Therapy Goal  Goal: Physical Therapy Goal  Goals to be met by: 3/19/18     Patient will increase functional independence with mobility by performin. Supine to sit with Modified Brule. - GOAL MET  2. Sit to stand transfer with Minimal Assistance. - GOAL MET  3. Bed to chair transfer with Contact Guard Assistance using Rolling Walker. - GOAL MET  4. Gait  x 150 feet with Contact Guard Assistance using Rolling Walker. - GOAL MET    5. Sit to supine with Modified Brule.  6. Lower extremity exercise program x 20 reps per handout, with assistance as needed.  7. Pt to perf 10x sit<>stand: 1 min 18 sec, to demonstrate improvements in B LE mm strength.  8. Pt to perf 6MWT: amb 566', to demonstrate a clinically significant improvement in aerobic capacity.    9. Pt to amb 150' with NAYE RW.      Outcome: Ongoing (interventions implemented as appropriate)  Goals remain appropriate.

## 2018-03-22 NOTE — SUBJECTIVE & OBJECTIVE
Scheduled Meds:   bisacodyl  10 mg Oral Daily    cycloSPORINE modified  125 mg Oral BID    docusate sodium  100 mg Oral BID    dronabinol  2.5 mg Oral QHS    epoetin maurisio (PROCRIT) injection  12,600 Units Intravenous Every Mon, Wed, Fri    ergocalciferol  50,000 Units Oral Q7 Days    fat emulsion 20%  250 mL Intravenous Once    heparin (porcine)  5,000 Units Subcutaneous Q8H    levetiracetam oral soln  500 mg Oral BID    levothyroxine  75 mcg Oral Before breakfast    metoclopramide HCl  10 mg Oral QID (AC & HS)    multivitamin  1 tablet Oral Daily    omeprazole  40 mg Oral QAM    polyethylene glycol  17 g Oral BID    [START ON 3/23/2018] predniSONE  15 mg Oral Daily    psyllium husk (aspartame)  3.4 g Oral BID    ursodiol  300 mg Oral BID     Continuous Infusions:   TPN ADULT CENTRAL LINE CUSTOM Stopped (03/22/18 1023)    TPN ADULT CENTRAL LINE CUSTOM       PRN Meds:sodium chloride 0.9%, acetaminophen, albuterol-ipratropium 2.5mg-0.5mg/3mL, bisacodyl, butalbital-acetaminophen-caffeine -40 mg, dextrose 50%, dextrose 50%, diphenhydrAMINE-zinc acetate 1-0.1%, glucagon (human recombinant), glucose, glucose, heparin (porcine), naloxone, ondansetron, ondansetron, oxyCODONE, oxyCODONE, prochlorperazine, simethicone, sodium chloride 0.9%, sodium chloride 0.9%    Review of Systems   Constitutional: Positive for activity change, appetite change and fatigue. Negative for fever.   HENT: Negative.  Negative for facial swelling.    Eyes: Negative.    Respiratory: Negative.  Negative for apnea, chest tightness, shortness of breath and wheezing.    Cardiovascular: Negative.  Negative for chest pain, palpitations and leg swelling.   Gastrointestinal: Positive for abdominal distention, abdominal pain and constipation. Negative for diarrhea, nausea and vomiting.   Genitourinary: Positive for decreased urine volume. Negative for difficulty urinating, dysuria, hematuria and urgency.   Musculoskeletal: Negative.   Negative for back pain, gait problem, neck pain and neck stiffness.   Skin: Negative.  Negative for color change and pallor.   Allergic/Immunologic: Positive for immunocompromised state.   Neurological: Negative.  Negative for dizziness, seizures, weakness, light-headedness and headaches.   Psychiatric/Behavioral: Positive for dysphoric mood. Negative for behavioral problems, confusion, hallucinations, sleep disturbance and suicidal ideas. The patient is not nervous/anxious.      Objective:     Vital Signs (Most Recent):  Temp: 98.4 °F (36.9 °C) (03/22/18 0721)  Pulse: 92 (03/22/18 0721)  Resp: 14 (03/22/18 0721)  BP: (!) 151/94 (03/22/18 0721)  SpO2: 97 % (03/22/18 0721) Vital Signs (24h Range):  Temp:  [98.4 °F (36.9 °C)-99.1 °F (37.3 °C)] 98.4 °F (36.9 °C)  Pulse:  [89-93] 92  Resp:  [14-18] 14  SpO2:  [95 %-99 %] 97 %  BP: (146-160)/(90-94) 151/94     Weight: 72.1 kg (158 lb 15.2 oz) (standing)  Body mass index is 25.67 kg/m².    Intake/Output - Last 3 Shifts       03/20 0700 - 03/21 0659 03/21 0700 - 03/22 0659 03/22 0700 - 03/23 0659    P.O. 325 740 200    I.V. (mL/kg) 50 (0.7) 0 (0) 0 (0)    Other 0 0 0    IV Piggyback 250      TPN 1089.3 1243.6 430.7    Total Intake(mL/kg) 1714.3 (24.1) 1983.6 (27.5) 630.7 (8.7)    Urine (mL/kg/hr) 810 (0.5) 950 (0.5) 400 (1)    Emesis/NG output 0 (0) 0 (0)     Other 0 (0) 0 (0)     Stool 0 (0) 0 (0) 0 (0)    Blood 0 (0) 0 (0)     Total Output 810 950 400    Net +904.3 +1033.6 +230.7           Urine Occurrence 0 x 0 x 0 x    Stool Occurrence 3 x 0 x 0 x    Emesis Occurrence 0 x 0 x           Physical Exam   Constitutional: He is oriented to person, place, and time. He appears well-developed.   Hand and temporal muscle wasting   HENT:   Head: Normocephalic.   Eyes: Pupils are equal, round, and reactive to light. No scleral icterus.   Cardiovascular: Normal rate, regular rhythm, normal heart sounds and intact distal pulses.    Pulmonary/Chest: Effort normal. No respiratory  distress. He has decreased breath sounds in the right lower field and the left lower field. He has no wheezes. He has no rhonchi. He has no rales.   Abdominal: Soft. Bowel sounds are normal. He exhibits distension. There is tenderness in the right upper quadrant.   Musculoskeletal: Normal range of motion. He exhibits edema (generalized).   Neurological: He is alert and oriented to person, place, and time.   Skin: Skin is warm and dry.   Psychiatric: He has a normal mood and affect. His behavior is normal. Judgment and thought content normal.   Nursing note and vitals reviewed.      Laboratory:  Immunosuppressants         Stop Route Frequency     cycloSPORINE modified capsule 125 mg      -- Oral 2 times daily     cycloSPORINE modified (NEORAL) 25 MG capsule      03/09 1714       cycloSPORINE modified (NEORAL) 100 MG capsule      03/09 1714       cycloSPORINE modified (NEORAL) 100 MG capsule      03/09 0514       cycloSPORINE modified (NEORAL) 25 MG capsule      03/09 0514          CBC:   Recent Labs  Lab 03/22/18  0500   WBC 6.15   RBC 2.41*   HGB 7.9*   HCT 24.4*   PLT 95*   *   MCH 32.8*   MCHC 32.4     CMP:   Recent Labs  Lab 03/22/18  0500   GLU 94   CALCIUM 9.0   ALBUMIN 2.7*   PROT 5.8*      K 3.7   CO2 23      BUN 74*   CREATININE 3.7*   ALKPHOS 97   ALT 5*   AST 23   BILITOT 1.1*     Coagulation: No results for input(s): PT, INR, APTT in the last 168 hours.  Labs within the past 24 hours have been reviewed.    Diagnostic Results:  I have personally reviewed all pertinent imaging studies.

## 2018-03-22 NOTE — ASSESSMENT & PLAN NOTE
- HD resumed on previous admission. Anuric on admission and dialyzes M-W-F.  - Nephrology following.    - Goal is for kidney txp in future- currently not a candidate due to health status.  - urine output has improved over last several days since 3/16, around 900ml uop daily.    - HD held 3/21, trialed lasix but patient refused lasix, prefers diamox  - 24 hour CrCl in progress. Plan to start diamox tomorrow after 24h collection.

## 2018-03-22 NOTE — ASSESSMENT & PLAN NOTE
- Poor PO intake since transplant requiring short course of TPN during previous hospital stay.   - Due to N/V and abdominal pain, multiple attempts were made to place SHANE tube with tube feeding, but patient did not tolerate TF  - Transitioned to TPN 1/16 then again on 2/24 due to poor tolerance of SHANE tube/TF  - prealbumin 7 on 1/15 --> prealbumin 22 on 3/19, monitoring weekly (Mondays)  - EGD performed 1/25 and 3/6, gastroparesis, duodenal ulcer, and congested/erythematous gastric mucosa  - Small bowel through with normal findings.  - Consulted GI for GJ tube placement, but was canceled due to ongoing fevers  - Hold Reglan and Marinol 3/8 for neuro symptoms --> restarted reglan and marinol 3/19  - set goals for caloric intake with patient, will try nocturnal TPN started 3/20 PM to encourage appetite during the day

## 2018-03-22 NOTE — PROGRESS NOTES
"[facility]  Nephrology  Progress Note    Patient Name: Jhonny Diana  MRN: [id number]  Admission Date: 1/11/2018  Hospital Length of Stay: 70 days  Attending Provider: Nathanael Medina MD   Primary Care Physician: Primary Doctor No  Principal Problem:Fever    Subjective:     HPI: Jhonny Diana is a 28 y/o  male with PMHx relevant for ETOH cirrhosis.  S/p DDLT 10/19/2017; c/b seizures (swtiched off prograf to cyclo), ATN dialysis dependant in iHD MWF, last HD on 1/10, anuric, superficial wound infection s/p wound vac to chevron incision, and multiple admissions for fevers finally dx with peritonitis in November (WBC 5000, 75% PNM) neg for bile leak. HE gets recurrent paracentesis and has peritoneal drain. Other pertinent PMH current wound vac in place 2/2 wound infection, malnutrition requiring TPN for short course and ongoing hypoalbuminemia, and seizure activity while on prograf and has since been switched to cyclosporine without reoccurrence.  He is admitted to LTx service secondary to fever, abdominal pain, and N/V. He reports fever (103) for 1 day prior. Overnight, he developed N/V, reports small amount of green emesis with new left sided pain. He also endorses worsening SOB with exertion. He was scheduled as an outpatient for follow up paracentesis and IR drainage of fluid collection. CXR in ER shows large pleural effusion with subsegmental atelectasis. His ANC is 900. Broad spectrum antibiotics initiated in ED. Nephrology consulted for LAURA dialysis dependant co management. He dialyses at [facility] under the care of Dr. Peterson via [facility] rowe.      Interval History: Feels "fine." Refused lasix yesterday but agrees to try torsemide.  ml. Net pos 1 L . Cr 3.7 from 3.3.     Review of patient's allergies indicates:   Allergen Reactions    Bactrim [sulfamethoxazole-trimethoprim] Other (See Comments)     Mookie Trell Syndrome     Current Facility-Administered Medications   Medication " Frequency    0.9%  NaCl infusion PRN    acetaminophen tablet 650 mg Q6H PRN    albuterol-ipratropium 2.5mg-0.5mg/3mL nebulizer solution 3 mL Q4H PRN    bisacodyl EC tablet 10 mg Daily    bisacodyl suppository 10 mg Daily PRN    butalbital-acetaminophen-caffeine -40 mg per tablet 1 tablet Q4H PRN    cycloSPORINE modified capsule 125 mg BID    dextrose 50% injection 12.5 g PRN    dextrose 50% injection 25 g PRN    diphenhydrAMINE-zinc acetate 1-0.1% cream TID PRN    docusate sodium capsule 100 mg BID    dronabinol capsule 2.5 mg QHS    epoetin maurisio injection 12,600 Units Every Mon, Wed, Fri    ergocalciferol capsule 50,000 Units Q7 Days    fat emulsion 20% infusion 250 mL Once    glucagon (human recombinant) injection 1 mg PRN    glucose chewable tablet 16 g PRN    glucose chewable tablet 24 g PRN    heparin (porcine) injection 1,000 Units PRN    heparin (porcine) injection 5,000 Units Q8H    levetiracetam oral soln Soln 500 mg BID    levothyroxine tablet 75 mcg Before breakfast    metoclopramide HCl 5 mg/5 mL solution 10 mg QID (AC & HS)    multivitamin tablet 1 tablet Daily    naloxone 0.4 mg/mL injection 0.2 mg PRN    omeprazole capsule 40 mg QAM    ondansetron disintegrating tablet 8 mg Q6H PRN    ondansetron injection 4 mg Q6H PRN    oxyCODONE immediate release tablet 5 mg Q4H PRN    oxyCODONE immediate release tablet 5 mg Nightly PRN    polyethylene glycol packet 17 g BID    predniSONE tablet 20 mg Daily    prochlorperazine injection Soln 10 mg Q6H PRN    psyllium husk (aspartame) 3.4 gram PwPk 1 packet BID    simethicone chewable tablet 80 mg TID PRN    sodium chloride 0.9% flush 3 mL PRN    sodium chloride 0.9% flush 3 mL PRN    TPN ADULT CENTRAL LINE CUSTOM Continuous    TPN ADULT CENTRAL LINE CUSTOM Continuous    ursodiol oral suspension (conc: 60 mg/mL) 300 mg BID       Objective:     Vital Signs (Most Recent):  Temp: 98.4 °F (36.9 °C) (03/22/18 0721)  Pulse: 92  (03/22/18 0721)  Resp: 14 (03/22/18 0721)  BP: (!) 151/94 (03/22/18 0721)  SpO2: 97 % (03/22/18 0721)  O2 Device (Oxygen Therapy): room air (03/22/18 0721) Vital Signs (24h Range):  Temp:  [98.4 °F (36.9 °C)-99.1 °F (37.3 °C)] 98.4 °F (36.9 °C)  Pulse:  [89-97] 92  Resp:  [14-18] 14  SpO2:  [95 %-99 %] 97 %  BP: (146-160)/(90-94) 151/94     Weight: 72.1 kg (158 lb 15.2 oz) (standing) (03/22/18 0500)  Body mass index is 25.67 kg/m².  Body surface area is 1.83 meters squared.    I/O last 3 completed shifts:  In: 3040.9 [P.O.:890; IV Piggyback:250]  Out: 1260 [Urine:1260]    Physical Exam   Constitutional: He is oriented to person, place, and time.   HENT:   Head: Atraumatic.   Mouth/Throat: No oropharyngeal exudate.   Mild facial edema   Eyes: No scleral icterus.   Cardiovascular: Normal rate and regular rhythm.    Pulmonary/Chest: Effort normal and breath sounds normal. No respiratory distress. He has no wheezes. He has no rales.   Abdominal: Soft. There is no tenderness. There is no rebound.   acites   Musculoskeletal: Edema: mild BLE edema.   Lymphadenopathy:     He has no cervical adenopathy.   Neurological: He is alert and oriented to person, place, and time.   Skin: He is not diaphoretic.       Significant Labs:  All labs within the past 24 hours have been reviewed.     Significant Imaging:  Labs: Reviewed    Assessment/Plan:     Acute renal failure with tubular necrosis    LAURA dialysisi dependant since 1016/2017  HD on [facility] under the care of Dr. Peterson  On HD for: 2.5 mo since 10/16/2017  Duration of outpatient dialysis session - 3.5 hrs  EDW - TBD  Residual Renal Function - yes   -Increase in UOP over weekend- ml/24 hrs without diuretics. Cr 2.5>3.3>3.7 last 3 days..    -Hold HD today. Strict IO. Daily Weights.   -24 hr creatinine clearance.   -Can start torsemide tomorrow after 24 hr CrCl collection completion   -If shortness of breath, okay to give torsemide even if 24 hr CrCl collection not  completed. Please call Nephrology with any questions.     Anemia of CKD  -Increase Epogen by 25%. Please give even if not going to dialysis.                      Thank you for your consult. I will follow-up with patient. Please contact us if you have any additional questions.    Kay Guillen, SAMREEN  Nephrology  [facility]      I have reviewed and concur with the NP's history, physical, assessment, and plan. I have personally interviewed and examined the patient at bedside.

## 2018-03-22 NOTE — PROGRESS NOTES
On Wednesday 3/21 SW presented to patient for follow up and continuity of care post liver txp.  The patient was observed to be standing in the room coming from the restroom alert, oriented x4 and communicative. The patient made appropriate eye contact when communicating with SW.  The patient was alone at this time reporting that his mother assumed he'd be in HD and did not stay over.  Pt reported his caregiver went over to the  apartments for rest and clothes.  Pt reported he was trying to rest but can never get sleep as the team is constantly in his room.  SW provided emotional support, reflective listening and normalization.  The pt reported adequate coping stating that he hopes he will be able to eat more and feels the Marinol has been working for him.  The pt reported he is working hard on getting out of the hospital.  Pt denied any needs at this time.  There were no further psychosocial issues identified. No discharge plans identified at this time. SW remains available.

## 2018-03-22 NOTE — PT/OT/SLP PROGRESS
"Physical Therapy Treatment    Patient Name:  Jhonny Diana   MRN:  [id number]    Recommendations:     Discharge Recommendations:  outpatient PT   Discharge Equipment Recommendations: bath bench   Barriers to discharge: None    Assessment:     Jhonny Diana is a 27 y.o. male admitted with a medical diagnosis of Fever.  He presents with the following impairments/functional limitations:  weakness, impaired endurance, impaired functional mobilty, gait instability, impaired self care skills, impaired balance, impaired cardiopulmonary response to activity. Pt tolerated tx well with focus on gait training, balance exercise, and standing therex. Pt progressing well and could benefit from transition to less restrictive AD as pt does not use RW as intended to be used. Pt will continue to benefit from skilled therapy to improve impairments listed here.     Rehab Prognosis:  Good ; patient would benefit from acute skilled PT services to address these deficits and reach maximum level of function.      Recent Surgery: Procedure(s) (LRB):  ERCP (N/A)  ULTRASOUND-ENDOSCOPIC-UPPER (N/A) 13 Days Post-Op    Plan:     During this hospitalization, patient to be seen 4 x/week to address the above listed problems via gait training, therapeutic activities, therapeutic exercises, neuromuscular re-education  · Plan of Care Expires:  04/05/18   Plan of Care Reviewed with: patient, mother    Subjective     Communicated with NS prior to session.  Patient found supine sleeping upon PTA entry to room, agreeable to treatment.      Chief Complaint: Pt c/o fatigue.   Patient comments/goals: "I'm tired, but I actually feel better when I'm up and walking."  Pain/Comfort:  · Pain Rating 1: 7/10  · Location - Orientation 1: midline  · Location 1: abdomen  · Pain Addressed 1: Reposition, Distraction, Other (see comments) (Pt states he will request pain medicine from NSG after tx. )  · Pain Rating Post-Intervention 1: 7/10    Patients cultural, spiritual, " Islam conflicts given the current situation: none stated    Objective:     Patient found with: central line, telemetry     General Precautions: Standard, fall   Orthopedic Precautions:N/A   Braces: N/A     Functional Mobility:  · Bed Mobility:     · Supine to Sit: modified independence  · Transfers:     · Sit to Stand:  supervision with rolling walker  · Gait: Pt ambulates 20 ft no AD, 340 ft and 420 ft with RW and Supervision assist. Pt given VC's for safety awareness specifically with turning as pt sways laterally when turning. Slightly decreased saskia.   · Balance: Pt sits EOB and on bedside sofa with supeprvision. Pt stand statically/dynamically with SBA as he accepts pertubations to challenge balance.       AM-PAC 6 CLICK MOBILITY  Turning over in bed (including adjusting bedclothes, sheets and blankets)?: 4  Sitting down on and standing up from a chair with arms (e.g., wheelchair, bedside commode, etc.): 4  Moving from lying on back to sitting on the side of the bed?: 4  Moving to and from a bed to a chair (including a wheelchair)?: 3  Need to walk in hospital room?: 3  Climbing 3-5 steps with a railing?: 3  Total Score: 21       Therapeutic Activities and Exercises:   Pt supervised ambulating w/o AD to bathroom for positive void, pt updates white board with output.   Pt educated on improving balance and on POC/plan to transition to less restrictive AD in next session.   Pt performs balance exercise in static standing, accepting pertubations at shoulder and waist level for 3 minutes with pt able to utilize stepping strategy to maintain balance when moved outside of limits of balance.   Pt performs BLE standing therex x 20 reps : calf raises, HS curls, hip flexion  Pt educated on need for supervision for safety while ambulating long distances.     Patient left sitting at bedside sofa  with mother present and instructed to be supervised when returning to bed. ..    GOALS:    Physical Therapy Goals         Problem: Physical Therapy Goal    Goal Priority Disciplines Outcome Goal Variances Interventions   Physical Therapy Goal     PT/OT, PT Ongoing (interventions implemented as appropriate)     Description:  Goals to be met by: 3/19/18     Patient will increase functional independence with mobility by performin. Supine to sit with Modified Meagher. - GOAL MET  2. Sit to stand transfer with Minimal Assistance. - GOAL MET  3. Bed to chair transfer with Contact Guard Assistance using Rolling Walker. - GOAL MET  4. Gait  x 150 feet with Contact Guard Assistance using Rolling Walker. - GOAL MET    5. Sit to supine with Modified Meagher.  6. Lower extremity exercise program x 20 reps per handout, with assistance as needed.  7. Pt to perf 10x sit<>stand: 1 min 18 sec, to demonstrate improvements in B LE mm strength.  8. Pt to perf 6MWT: amb 566', to demonstrate a clinically significant improvement in aerobic capacity.    9. Pt to amb 150' with SBA RW.                       Time Tracking:     PT Received On: 18  PT Start Time: 1358     PT Stop Time: 1426  PT Total Time (min): 28 min     Billable Minutes: Gait Training 15 and Therapeutic Activity 13    Treatment Type: Treatment  PT/PTA: PTA     PTA Visit Number: 1     Ian Ricks, MINISTERIO  2018

## 2018-03-22 NOTE — ASSESSMENT & PLAN NOTE
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  - Tbili with increase prompting ERCP 1/30 with stones and sludge, stents placed.  - Repeat ERCP 2/19 as was having recurrent fevers with bilirubin stuck at 2.0-2.2 with overall unremarkable findings seen. Did place new larger stents.   - ERCP/EUS 3/9 with metal stent placed in bile duct, stones removed  - Tbili remains stable. Continue actigall.

## 2018-03-22 NOTE — SUBJECTIVE & OBJECTIVE
"Interval History: Feels "fine." Refused lasix yesterday but agrees to try torsemide.  ml. Net pos 1 L . Cr 3.7 from 3.3.     Review of patient's allergies indicates:   Allergen Reactions    Bactrim [sulfamethoxazole-trimethoprim] Other (See Comments)     Mookie Trell Syndrome     Current Facility-Administered Medications   Medication Frequency    0.9%  NaCl infusion PRN    acetaminophen tablet 650 mg Q6H PRN    albuterol-ipratropium 2.5mg-0.5mg/3mL nebulizer solution 3 mL Q4H PRN    bisacodyl EC tablet 10 mg Daily    bisacodyl suppository 10 mg Daily PRN    butalbital-acetaminophen-caffeine -40 mg per tablet 1 tablet Q4H PRN    cycloSPORINE modified capsule 125 mg BID    dextrose 50% injection 12.5 g PRN    dextrose 50% injection 25 g PRN    diphenhydrAMINE-zinc acetate 1-0.1% cream TID PRN    docusate sodium capsule 100 mg BID    dronabinol capsule 2.5 mg QHS    epoetin maurisio injection 12,600 Units Every Mon, Wed, Fri    ergocalciferol capsule 50,000 Units Q7 Days    fat emulsion 20% infusion 250 mL Once    glucagon (human recombinant) injection 1 mg PRN    glucose chewable tablet 16 g PRN    glucose chewable tablet 24 g PRN    heparin (porcine) injection 1,000 Units PRN    heparin (porcine) injection 5,000 Units Q8H    levetiracetam oral soln Soln 500 mg BID    levothyroxine tablet 75 mcg Before breakfast    metoclopramide HCl 5 mg/5 mL solution 10 mg QID (AC & HS)    multivitamin tablet 1 tablet Daily    naloxone 0.4 mg/mL injection 0.2 mg PRN    omeprazole capsule 40 mg QAM    ondansetron disintegrating tablet 8 mg Q6H PRN    ondansetron injection 4 mg Q6H PRN    oxyCODONE immediate release tablet 5 mg Q4H PRN    oxyCODONE immediate release tablet 5 mg Nightly PRN    polyethylene glycol packet 17 g BID    predniSONE tablet 20 mg Daily    prochlorperazine injection Soln 10 mg Q6H PRN    psyllium husk (aspartame) 3.4 gram PwPk 1 packet BID    simethicone chewable " tablet 80 mg TID PRN    sodium chloride 0.9% flush 3 mL PRN    sodium chloride 0.9% flush 3 mL PRN    TPN ADULT CENTRAL LINE CUSTOM Continuous    TPN ADULT CENTRAL LINE CUSTOM Continuous    ursodiol oral suspension (conc: 60 mg/mL) 300 mg BID       Objective:     Vital Signs (Most Recent):  Temp: 98.4 °F (36.9 °C) (03/22/18 0721)  Pulse: 92 (03/22/18 0721)  Resp: 14 (03/22/18 0721)  BP: (!) 151/94 (03/22/18 0721)  SpO2: 97 % (03/22/18 0721)  O2 Device (Oxygen Therapy): room air (03/22/18 0721) Vital Signs (24h Range):  Temp:  [98.4 °F (36.9 °C)-99.1 °F (37.3 °C)] 98.4 °F (36.9 °C)  Pulse:  [89-97] 92  Resp:  [14-18] 14  SpO2:  [95 %-99 %] 97 %  BP: (146-160)/(90-94) 151/94     Weight: 72.1 kg (158 lb 15.2 oz) (standing) (03/22/18 0500)  Body mass index is 25.67 kg/m².  Body surface area is 1.83 meters squared.    I/O last 3 completed shifts:  In: 3040.9 [P.O.:890; IV Piggyback:250]  Out: 1260 [Urine:1260]    Physical Exam   Constitutional: He is oriented to person, place, and time.   HENT:   Head: Atraumatic.   Mouth/Throat: No oropharyngeal exudate.   Mild facial edema   Eyes: No scleral icterus.   Cardiovascular: Normal rate and regular rhythm.    Pulmonary/Chest: Effort normal and breath sounds normal. No respiratory distress. He has no wheezes. He has no rales.   Abdominal: Soft. There is no tenderness. There is no rebound.   acites   Musculoskeletal: Edema: mild BLE edema.   Lymphadenopathy:     He has no cervical adenopathy.   Neurological: He is alert and oriented to person, place, and time.   Skin: He is not diaphoretic.       Significant Labs:  All labs within the past 24 hours have been reviewed.     Significant Imaging:  Labs: Reviewed

## 2018-03-23 LAB
ACID FAST MOD KINY STN SPEC: NORMAL
ALBUMIN SERPL BCP-MCNC: 2.5 G/DL
ALP SERPL-CCNC: 103 U/L
ALT SERPL W/O P-5'-P-CCNC: 6 U/L
ANION GAP SERPL CALC-SCNC: 13 MMOL/L
ANISOCYTOSIS BLD QL SMEAR: SLIGHT
AST SERPL-CCNC: 21 U/L
BASOPHILS # BLD AUTO: ABNORMAL K/UL
BASOPHILS NFR BLD: 2 %
BILIRUB SERPL-MCNC: 1.1 MG/DL
BUN SERPL-MCNC: 89 MG/DL
CALCIUM SERPL-MCNC: 8.8 MG/DL
CHLORIDE SERPL-SCNC: 104 MMOL/L
CMV DNA SERPL NAA+PROBE-ACNC: NORMAL IU/ML
CO2 SERPL-SCNC: 22 MMOL/L
CREAT SERPL-MCNC: 4.1 MG/DL
CYCLOSPORINE BLD LC/MS/MS-MCNC: 186 NG/ML
DIFFERENTIAL METHOD: ABNORMAL
EOSINOPHIL # BLD AUTO: ABNORMAL K/UL
EOSINOPHIL NFR BLD: 0 %
ERYTHROCYTE [DISTWIDTH] IN BLOOD BY AUTOMATED COUNT: 18.6 %
EST. GFR  (AFRICAN AMERICAN): 21.4 ML/MIN/1.73 M^2
EST. GFR  (NON AFRICAN AMERICAN): 18.5 ML/MIN/1.73 M^2
GLUCOSE SERPL-MCNC: 97 MG/DL
HCT VFR BLD AUTO: 24.9 %
HGB BLD-MCNC: 8 G/DL
IMM GRANULOCYTES # BLD AUTO: ABNORMAL K/UL
IMM GRANULOCYTES NFR BLD AUTO: ABNORMAL %
LYMPHOCYTES # BLD AUTO: ABNORMAL K/UL
LYMPHOCYTES NFR BLD: 27 %
MAGNESIUM SERPL-MCNC: 2.4 MG/DL
MCH RBC QN AUTO: 32.5 PG
MCHC RBC AUTO-ENTMCNC: 32.1 G/DL
MCV RBC AUTO: 101 FL
METAMYELOCYTES NFR BLD MANUAL: 1 %
MONOCYTES # BLD AUTO: ABNORMAL K/UL
MONOCYTES NFR BLD: 11 %
MYCOBACTERIUM SPEC QL CULT: NORMAL
NEUTROPHILS NFR BLD: 55 %
NEUTS BAND NFR BLD MANUAL: 4 %
NRBC BLD-RTO: 0 /100 WBC
OVALOCYTES BLD QL SMEAR: ABNORMAL
PHOSPHATE SERPL-MCNC: 3.7 MG/DL
PLATELET # BLD AUTO: 104 K/UL
PMV BLD AUTO: 12.6 FL
POIKILOCYTOSIS BLD QL SMEAR: SLIGHT
POLYCHROMASIA BLD QL SMEAR: ABNORMAL
POTASSIUM SERPL-SCNC: 4 MMOL/L
PROT SERPL-MCNC: 5.5 G/DL
RBC # BLD AUTO: 2.46 M/UL
SODIUM SERPL-SCNC: 139 MMOL/L
WBC # BLD AUTO: 6.34 K/UL

## 2018-03-23 PROCEDURE — 94664 DEMO&/EVAL PT USE INHALER: CPT

## 2018-03-23 PROCEDURE — 97116 GAIT TRAINING THERAPY: CPT

## 2018-03-23 PROCEDURE — 83735 ASSAY OF MAGNESIUM: CPT

## 2018-03-23 PROCEDURE — 25000003 PHARM REV CODE 250: Performed by: NURSE PRACTITIONER

## 2018-03-23 PROCEDURE — 80053 COMPREHEN METABOLIC PANEL: CPT

## 2018-03-23 PROCEDURE — 63600175 PHARM REV CODE 636 W HCPCS: Mod: JG | Performed by: NURSE PRACTITIONER

## 2018-03-23 PROCEDURE — 85027 COMPLETE CBC AUTOMATED: CPT

## 2018-03-23 PROCEDURE — 25000003 PHARM REV CODE 250: Performed by: PHYSICIAN ASSISTANT

## 2018-03-23 PROCEDURE — 20600001 HC STEP DOWN PRIVATE ROOM

## 2018-03-23 PROCEDURE — A4217 STERILE WATER/SALINE, 500 ML: HCPCS | Performed by: NURSE PRACTITIONER

## 2018-03-23 PROCEDURE — 94761 N-INVAS EAR/PLS OXIMETRY MLT: CPT

## 2018-03-23 PROCEDURE — 82575 CREATININE CLEARANCE TEST: CPT

## 2018-03-23 PROCEDURE — B4185 PARENTERAL SOL 10 GM LIPIDS: HCPCS | Performed by: NURSE PRACTITIONER

## 2018-03-23 PROCEDURE — 63600175 PHARM REV CODE 636 W HCPCS: Performed by: NURSE PRACTITIONER

## 2018-03-23 PROCEDURE — 80158 DRUG ASSAY CYCLOSPORINE: CPT

## 2018-03-23 PROCEDURE — 84100 ASSAY OF PHOSPHORUS: CPT

## 2018-03-23 PROCEDURE — 99900035 HC TECH TIME PER 15 MIN (STAT)

## 2018-03-23 PROCEDURE — 99233 SBSQ HOSP IP/OBS HIGH 50: CPT | Mod: ,,, | Performed by: NURSE PRACTITIONER

## 2018-03-23 PROCEDURE — 99232 SBSQ HOSP IP/OBS MODERATE 35: CPT | Mod: ,,, | Performed by: NURSE PRACTITIONER

## 2018-03-23 PROCEDURE — 97803 MED NUTRITION INDIV SUBSEQ: CPT | Performed by: DIETITIAN, REGISTERED

## 2018-03-23 PROCEDURE — 85007 BL SMEAR W/DIFF WBC COUNT: CPT

## 2018-03-23 RX ORDER — MYCOPHENOLATE MOFETIL 250 MG/1
500 CAPSULE ORAL 2 TIMES DAILY
Status: DISCONTINUED | OUTPATIENT
Start: 2018-03-23 | End: 2018-04-01

## 2018-03-23 RX ADMIN — MYCOPHENOLATE MOFETIL 500 MG: 250 CAPSULE ORAL at 11:03

## 2018-03-23 RX ADMIN — LEVOTHYROXINE SODIUM 75 MCG: 75 TABLET ORAL at 05:03

## 2018-03-23 RX ADMIN — DRONABINOL 2.5 MG: 2.5 CAPSULE ORAL at 09:03

## 2018-03-23 RX ADMIN — LEVETIRACETAM 500 MG: 100 SOLUTION ORAL at 08:03

## 2018-03-23 RX ADMIN — ACETAZOLAMIDE 500 MG: 500 INJECTION, POWDER, LYOPHILIZED, FOR SOLUTION INTRAVENOUS at 04:03

## 2018-03-23 RX ADMIN — URSODIOL 300 MG: 300 CAPSULE ORAL at 09:03

## 2018-03-23 RX ADMIN — SOYBEAN OIL 250 ML: 20 INJECTION, SOLUTION INTRAVENOUS at 10:03

## 2018-03-23 RX ADMIN — METOCLOPRAMIDE HYDROCHLORIDE 10 MG: 5 SOLUTION ORAL at 05:03

## 2018-03-23 RX ADMIN — CYCLOSPORINE 125 MG: 100 CAPSULE, LIQUID FILLED ORAL at 08:03

## 2018-03-23 RX ADMIN — OXYCODONE HYDROCHLORIDE 5 MG: 5 TABLET ORAL at 06:03

## 2018-03-23 RX ADMIN — PROCHLORPERAZINE EDISYLATE 10 MG: 5 INJECTION INTRAMUSCULAR; INTRAVENOUS at 08:03

## 2018-03-23 RX ADMIN — URSODIOL 300 MG: 300 CAPSULE ORAL at 08:03

## 2018-03-23 RX ADMIN — ERYTHROPOIETIN 12600 UNITS: 20000 INJECTION, SOLUTION INTRAVENOUS; SUBCUTANEOUS at 04:03

## 2018-03-23 RX ADMIN — LEVETIRACETAM 500 MG: 100 SOLUTION ORAL at 09:03

## 2018-03-23 RX ADMIN — OXYCODONE HYDROCHLORIDE 5 MG: 5 TABLET ORAL at 01:03

## 2018-03-23 RX ADMIN — DOCUSATE SODIUM 100 MG: 100 CAPSULE, LIQUID FILLED ORAL at 09:03

## 2018-03-23 RX ADMIN — METOCLOPRAMIDE HYDROCHLORIDE 10 MG: 5 SOLUTION ORAL at 04:03

## 2018-03-23 RX ADMIN — PREDNISONE 15 MG: 5 TABLET ORAL at 08:03

## 2018-03-23 RX ADMIN — CYCLOSPORINE 125 MG: 100 CAPSULE, LIQUID FILLED ORAL at 04:03

## 2018-03-23 RX ADMIN — DOCUSATE SODIUM 100 MG: 100 CAPSULE, LIQUID FILLED ORAL at 08:03

## 2018-03-23 RX ADMIN — METOCLOPRAMIDE HYDROCHLORIDE 10 MG: 5 SOLUTION ORAL at 11:03

## 2018-03-23 RX ADMIN — OXYCODONE HYDROCHLORIDE 5 MG: 5 TABLET ORAL at 09:03

## 2018-03-23 RX ADMIN — THERA TABS 1 TABLET: TAB at 08:03

## 2018-03-23 RX ADMIN — MAGNESIUM SULFATE HEPTAHYDRATE: 500 INJECTION, SOLUTION INTRAMUSCULAR; INTRAVENOUS at 10:03

## 2018-03-23 RX ADMIN — METOCLOPRAMIDE HYDROCHLORIDE 10 MG: 5 SOLUTION ORAL at 09:03

## 2018-03-23 RX ADMIN — OMEPRAZOLE 40 MG: 40 CAPSULE, DELAYED RELEASE ORAL at 07:03

## 2018-03-23 RX ADMIN — MYCOPHENOLATE MOFETIL 500 MG: 250 CAPSULE ORAL at 09:03

## 2018-03-23 NOTE — PT/OT/SLP PROGRESS
Occupational Therapy      Patient Name:  Jhonny Diana   MRN:  [id number]  1335  Patient not seen today secondary to Other (Comment) pt declined at this time, wants to go downstairs with mom and then will ambulate with PT when he gets back. Will follow-up as schedule allows.    Zulma Renteria, OT  3/23/2018

## 2018-03-23 NOTE — ASSESSMENT & PLAN NOTE
- Started on vanc/cefepime for ERCP 1/11 then transitioned to vanc/zosyn on 1/12 due to fevers, then transitioned to ertapenem 2/22, ertapenem was d/c'd 3/7 due to AMS changes, following ERCP 3/9, he was treated with 3 week course of flagyl/cefepime, which he completed on 3/20.   - thoracentesis 1/11, 1/18, 1/31, and 2/16--all without infection, cultures NGTD  - multiple blood cultures sent over admission, all NGTD  - cmv positive 2/1--see below for CMV information  - taken to OR 2/7 for exp lap - fluid collections drained, cultures NGTD.  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  - IR attempted drainage on fluid collection on 2/27, however, per IR,there was limited fluid interposed with bowel in the RLQ with no safe pocket seen for drainage.  - Tagged WBC scan 2/27, negative results.  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 EBV PCR negative. Adenovirus negative. Repeat CMV PCR negative.    - Remains afebrile since 3/10 following ERCP 3/9 with metal stent placement  - Bx with cholestatic hepatitis --> couldn't cx secondary to EUS contamination --> will cx next bx  - Plan to continue abx (flagyl/cefepime) x 14 days to treat cholangitis --> d/c'd flagyl 3/19, d/c cefepime 3/20, and d/c isavu 3/21  - Bandemia noted on labs since 3/14. Continue to monitor.

## 2018-03-23 NOTE — PROGRESS NOTES
[facility]  Liver Transplant  Progress Note    Patient Name: Jhonny Diana  MRN: [id number]  Admission Date: 2018  Hospital Length of Stay: 71 days  Code Status: Full Code  Primary Care Provider: Primary Doctor No  Post-Operative Day: 155    ORGAN:   LIVER  Disease Etiology: Acute Alcoholic Hepatitis  Donor Type:    - Brain Death  CDC High Risk:   No  Donor CMV Status:   Donor CMV Status: Positive  Donor HBcAB:   Negative  Donor HCV Status:   Negative  Whole or Partial: Whole Liver  Biliary Anastomosis: End to End  Arterial Anatomy: Standard  Subjective:     History of Present Illness:  Jhonny Diana is a 28 y/o male with past medical history of alcoholic cirrhosis.  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 Found to have peritonitis in November (WBC 5000, 75% PNM) neg for bile leak. He was treated initially with vanc/cefepime. Repeat cell counts  with some improvement (WBC 1400, 45% PNM). He has undergone multiple paracenteses as well as abscess drainage of perihepatic fluid collections and treated with antimicrobial therapy but no positive cultures. Of note, biliary stricture also identified and ERCP performed on 2017 with sphincterotomy and biliary stent placed. Liver tests still have not normalized despite intervention, bilirubin and AP remain elevated. Other pertinent PMH current wound vac in place 2/2 wound infection, malnutrition requiring TPN for short course and ongoing hypoalbuminemia, and seizure activity while on prograf and has since been switched to cyclosporine without reoccurrence.  He presented to the ER for fever, abdominal pain, and N/V. He reports fever (103) for 1  day prior. Overnight, he developed N/V, reports small amount of green emesis with new left sided pain. He also endorses worsening SOB with exertion. He was scheduled as an outpatient for follow up paracentesis and IR drainage of fluid collection. CXR in ER shows large pleural effusion with subsegmental atelectasis. Infectious work up initiated in ER. His ANC is 900. Broad spectrum antibiotics initiated in ED. At admit, he denied chest pain, palpitations, diarrhea, constipation, back pain, or any sick contacts.    Hospital Course:  Unclear cause of ongoing fevers. Multiple infectious, diagnostic testing completed. Taken to OR for ex lap 2/6, loculated fluid collections found, cultures NGTD. Multiple blood cultures with NGTD. Thoracentesis 1/11, 1/18, 1/31, and 2/16, cultures NGTD. Paracentesis 1/11, 1/19, and 2/5, cultures NGTD. negative fungal markers/quant gold intermediate. Negative TTE.  IR drainage 1/11, cultures NGTD. Continued with intermittent fevers despite broad spectrum antibiotics that were initially started 1/11, then broadened antibiotics 2/22 from vanc/zosyn to ertapenem. Multiple CT scan of abd obtained that were unremarkable. Tagged WBCs scan 2/27 without source of infection found. He had several episodes of neutropenia requiring neupogen, last given 2/28. 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 Monitoring weekly CMV PCR. Chimerism studies obtained 3/1 to assess for GVHD, which was negative, EBV PCR negative, adenovirus negative. In addition to fevers, he has consistently c/o abdominal pain and N/V. Tbili has remain elevated post-op requiring multiple ERCPs. ERCPs performed this admission on 1/30, 2/19, and 3/9. ERCPs with sludge and stones with stents placed. Last ERCP 3/9 with metal stent placed into CBD and stones removed. His ertapenem was d/cd 3/7 due to AMS. Following ERCP 3/9, he was started on cefepime and flagyl  for possible cholangitis. He has remained afebrile since 3/10. He completed cefepime/flagyl after 3 weeks on 3/19 & 3/20. Due to elevated enzymes, he had a liver biopsy 1/23 consistent with cholestatic hepatitis (no cultures obtained during biopsy). GI was consulted for persistent N/V and malnutrition. EGD performed 1/25 and 3/6, EGD 3/6 with normal esophagus congested, erythematous and nodular mucosa in the stomach, one duodenal ulcer with a clean ulcer base (Emir Class III), biopsies taken, negative for CMV and GVHD. Due to N/V and abdominal pain, multiple attempts were made to place SHANE tube with tube feeding, but patient did not tolerate TF. Therefore, he was transitioned to TPN 1/16 then again on 2/24. He also has chronic constipation. Small bowel follow through study with normal findings. Following EGD 1/25, he was started on a PPI, miralax, and Metamucil, but he did not tolerate meds. For appetite stimulation he was trialed on marinol. He also did well with reglan before meals and at bedtime. Early post-op liver transplant surgery, he was switched to cyclosporine for seizures. During this admission, he was transitioned back to prograf 2/24 with keppra on board. Starting 3/1 he developed stuttering speech, disorientation. CT head 3/1 and MRI head 3/6 unremarkable. EEG 3/8 with no seizure activity. He started on rapamune and stress dose steroids 3/10, then ultimately returned to cyclosporine 3/13 and rapamune d/cd same day. Since transitioning back to cyclosporine, his mental status has remained stable. During admission, patient was evaluated by [facility] for future kidney transplant, and he was deemed to be a suitable candidate once medically stable and cleared. He dialyzes on a M-W-F schedule. He was anuric on admission, but starting producing urine around 3/16 and continues to produce around 800-900cc daily.     Interval History: no acute events overnight. Slowly trying to improve caloric intake, goal 300-500  jalen/day, goal is to wean TPN once 700 jalen/day. Continue nocturnal TPN/Lipids for now. Continue reglan and marinol. No diuretics yesterday, uop 900/day, nephrology following, skipping HD today (Previously MWF scheduled, this week skipped WF). 24 hour CrCl in progress, plan to give diamox after collection finished. Denies SOB. Delta Cr 0.4 again today. He was very active yesterday, walking in the halls/hospital 4x, feels tired today.     Scheduled Meds:   acetaZOLAMIDE (DIAMOX) IVPB  500 mg Intravenous Once    bisacodyl  10 mg Oral Daily    cycloSPORINE modified  125 mg Oral BID    docusate sodium  100 mg Oral BID    dronabinol  2.5 mg Oral QHS    epoetin maurisio (PROCRIT) injection  12,600 Units Intravenous Every Mon, Wed, Fri    ergocalciferol  50,000 Units Oral Q7 Days    fat emulsion 20%  250 mL Intravenous Once    heparin (porcine)  5,000 Units Subcutaneous Q8H    levetiracetam oral soln  500 mg Oral BID    levothyroxine  75 mcg Oral Before breakfast    metoclopramide HCl  10 mg Oral QID (AC & HS)    mycophenolate  500 mg Oral BID    omeprazole  40 mg Oral QAM    polyethylene glycol  17 g Oral BID    predniSONE  15 mg Oral Daily    psyllium husk (aspartame)  3.4 g Oral BID    ursodiol  300 mg Oral BID     Continuous Infusions:   TPN ADULT CENTRAL LINE CUSTOM       PRN Meds:sodium chloride 0.9%, acetaminophen, albuterol-ipratropium 2.5mg-0.5mg/3mL, bisacodyl, butalbital-acetaminophen-caffeine -40 mg, dextrose 50%, dextrose 50%, diphenhydrAMINE-zinc acetate 1-0.1%, glucagon (human recombinant), glucose, glucose, heparin (porcine), naloxone, ondansetron, ondansetron, oxyCODONE, oxyCODONE, prochlorperazine, simethicone, sodium chloride 0.9%, sodium chloride 0.9%    Review of Systems   Constitutional: Positive for activity change, appetite change and fatigue. Negative for fever.   HENT: Negative.  Negative for facial swelling.    Eyes: Negative.    Respiratory: Negative.  Negative for apnea,  chest tightness, shortness of breath and wheezing.    Cardiovascular: Negative.  Negative for chest pain, palpitations and leg swelling.   Gastrointestinal: Positive for abdominal distention, abdominal pain and constipation. Negative for diarrhea, nausea and vomiting.   Genitourinary: Positive for decreased urine volume. Negative for difficulty urinating, dysuria, hematuria and urgency.   Musculoskeletal: Negative.  Negative for back pain, gait problem, neck pain and neck stiffness.   Skin: Negative.  Negative for color change and pallor.   Allergic/Immunologic: Positive for immunocompromised state.   Neurological: Negative.  Negative for dizziness, seizures, weakness, light-headedness and headaches.   Psychiatric/Behavioral: Positive for dysphoric mood. Negative for behavioral problems, confusion, hallucinations, sleep disturbance and suicidal ideas. The patient is not nervous/anxious.      Objective:     Vital Signs (Most Recent):  Temp: 98.3 °F (36.8 °C) (03/23/18 1202)  Pulse: 97 (03/23/18 1202)  Resp: 18 (03/23/18 1202)  BP: (!) 157/95 (03/23/18 1202)  SpO2: 95 % (03/23/18 1202) Vital Signs (24h Range):  Temp:  [98.3 °F (36.8 °C)-99.4 °F (37.4 °C)] 98.3 °F (36.8 °C)  Pulse:  [84-97] 97  Resp:  [18-20] 18  SpO2:  [95 %-99 %] 95 %  BP: (104-162)/(55-95) 157/95     Weight: 72 kg (158 lb 11.7 oz)  Body mass index is 25.63 kg/m².    Intake/Output - Last 3 Shifts       03/21 0700 - 03/22 0659 03/22 0700 - 03/23 0659 03/23 0700 - 03/24 0659    P.O. 740 640     I.V. (mL/kg) 0 (0) 0 (0)     Other 0 0     IV Piggyback       TPN 1243.6 904     Total Intake(mL/kg) 1983.6 (27.5) 1544 (21.4)     Urine (mL/kg/hr) 950 (0.5) 900 (0.5)     Emesis/NG output 0 (0)      Other 0 (0)      Stool 0 (0) 0 (0)     Blood 0 (0)      Total Output 950 900      Net +1033.6 +644             Urine Occurrence 0 x 0 x     Stool Occurrence 0 x 0 x     Emesis Occurrence 0 x            Physical Exam   Constitutional: He is oriented to person, place,  and time. He appears well-developed.   Hand and temporal muscle wasting   HENT:   Head: Normocephalic.   Eyes: Pupils are equal, round, and reactive to light. No scleral icterus.   Cardiovascular: Normal rate, regular rhythm, normal heart sounds and intact distal pulses.    Pulmonary/Chest: Effort normal. No respiratory distress. He has decreased breath sounds in the right lower field and the left lower field. He has no wheezes. He has no rhonchi. He has no rales.   Abdominal: Soft. Bowel sounds are normal. He exhibits distension. There is tenderness in the right upper quadrant.   Musculoskeletal: Normal range of motion. He exhibits edema (generalized).   Neurological: He is alert and oriented to person, place, and time.   Skin: Skin is warm and dry.   Psychiatric: He has a normal mood and affect. His behavior is normal. Judgment and thought content normal.   Nursing note and vitals reviewed.      Laboratory:  Immunosuppressants         Stop Route Frequency     mycophenolate capsule 500 mg      -- Oral 2 times daily     cycloSPORINE modified capsule 125 mg      -- Oral 2 times daily     cycloSPORINE modified (NEORAL) 25 MG capsule      03/09 1714       cycloSPORINE modified (NEORAL) 100 MG capsule      03/09 1714       cycloSPORINE modified (NEORAL) 100 MG capsule      03/09 0514       cycloSPORINE modified (NEORAL) 25 MG capsule      03/09 0514          CBC:   Recent Labs  Lab 03/23/18  0431   WBC 6.34   RBC 2.46*   HGB 8.0*   HCT 24.9*   *   *   MCH 32.5*   MCHC 32.1     CMP:   Recent Labs  Lab 03/23/18  0431   GLU 97   CALCIUM 8.8   ALBUMIN 2.5*   PROT 5.5*      K 4.0   CO2 22*      BUN 89*   CREATININE 4.1*   ALKPHOS 103   ALT 6*   AST 21   BILITOT 1.1*     Coagulation: No results for input(s): PT, INR, APTT in the last 168 hours.  Labs within the past 24 hours have been reviewed.    Diagnostic Results:  I have personally reviewed all pertinent imaging studies.    Assessment/Plan:      Severe protein-calorie malnutrition    - Poor PO intake since transplant requiring short course of TPN during previous hospital stay.   - Due to N/V and abdominal pain, multiple attempts were made to place SHANE tube with tube feeding, but patient did not tolerate TF  - Transitioned to TPN 1/16 then again on 2/24 due to poor tolerance of SHANE tube/TF  - prealbumin 7 on 1/15 --> prealbumin 22 on 3/19, monitoring weekly (Mondays)  - EGD performed 1/25 and 3/6, gastroparesis, duodenal ulcer, and congested/erythematous gastric mucosa  - Small bowel through with normal findings.  - Consulted GI for GJ tube placement, but was canceled due to ongoing fevers  - Hold Reglan and Marinol 3/8 for neuro symptoms --> restarted reglan and marinol 3/19, will need to dc marinol before discharge because not covered by insurance  - set goals for caloric intake with patient, will try nocturnal TPN started 3/20 PM to encourage appetite during the day  - Trying to set small caloric goals daily, for now goal is 300-500 jalen/day. Ultimately needs to consume 700 jalen/day to start weaning TPN.         Acute renal failure with tubular necrosis    - HD resumed on previous admission. Anuric on admission and dialyzes M-W-F.  - Nephrology following.    - Goal is for kidney txp in future- currently not a candidate due to health status.  - urine output has improved over last several days since 3/16, around 900ml uop daily.    - HD held 3/21 & 3/23, trialed lasix but patient refused lasix, prefers diamox  - 24 hour CrCl in progress. Diamox this afternoon after collection completed.   - BP trending up this week likely 2/2 hypervolemia, should improve with diuresis, monitor.        Biliary stricture of transplanted liver    - ERCP 12/6 with post-anastomosis stricture with stent placement.  - Tbili with increase prompting ERCP 1/30 with stones and sludge, stents placed.  - Repeat ERCP 2/19 as was having recurrent fevers with bilirubin stuck at 2.0-2.2  with overall unremarkable findings seen. Did place new larger stents.   - ERCP/EUS 3/9 with metal stent placed in bile duct, stones removed  - Tbili remains stable. Continue actigall.        Nausea and vomiting    - h/o constipation, abdominal pain, and N/V  - worsened with SHANE tube and TF --> now on TPN  - encourage bowel regimen  - EGD performed 1/25 and 3/6, gastroparesis, duodenal ulcer, and congested/erythematous gastric mucosa  - Small bowel through with normal findings  - Continue reglan and marinol scheduled, anti-emetics PRN.   - If cont with GI symptoms then may need to consider Flex sig/ C-scope to further eval.         Recurrent pleural effusion on right    - CXR in ER with large right pleural effusion with subjective c/o worsening SOB.  - Multiple Thora's this admission, all non-infectious: 1/11, 1/19, 1/31, 2/16  - CT A/P 2/26 with large R pleural effusion, may need repeat thora if fevers reoccur.    - complaining of sob, obtain chest xray, trial dose of diamox 3/14 with minimal UOP  - chest xray with large pleural effusion 3/13 --> may need thora soon if becomes symptomatic or develops fever  - Fluid removal with HD and/or diamox when HD held.        Liver transplanted    - Post op course complicated by fevers and hyperbilirubinemia, AST/ALT stable.  - see biliary stricture of transplanted liver  - Liver biopsy 1/23 without rejection.  - Paracentesis 3/6 negative for infection, wbc 28 segs 3%.  - ERCP, EUS biopsy 3/9/18 - no rejection, mild cholestatic hepatitis seen.         Fever    - Started on vanc/cefepime for ERCP 1/11 then transitioned to vanc/zosyn on 1/12 due to fevers, then transitioned to ertapenem 2/22, ertapenem was d/c'd 3/7 due to AMS changes, following ERCP 3/9, he was treated with 3 week course of flagyl/cefepime, which he completed on 3/20.   - thoracentesis 1/11, 1/18, 1/31, and 2/16--all without infection, cultures NGTD  - multiple blood cultures sent over admission, all NGTD  -  cmv positive 2/1--see below for CMV information  - taken to OR 2/7 for exp lap - fluid collections drained, cultures NGTD.  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  - IR attempted drainage on fluid collection on 2/27, however, per IR,there was limited fluid interposed with bowel in the RLQ with no safe pocket seen for drainage.  - Tagged WBC scan 2/27, negative results.  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 EBV PCR negative. Adenovirus negative. Repeat CMV PCR negative.    - Remains afebrile since 3/10 following ERCP 3/9 with metal stent placement  - Bx with cholestatic hepatitis --> couldn't cx secondary to EUS contamination --> will cx next bx  - Plan to continue abx (flagyl/cefepime) x 14 days to treat cholangitis --> d/c'd flagyl 3/19, d/c cefepime 3/20, and d/c isavu 3/21  - Bandemia noted on labs since 3/14. Continue to monitor.        Vitamin D deficiency    - Continue ergo 50K weekly.         Kidney transplant candidate              Other cytomegaloviral diseases    - detected at 370 on CMV PCR 2/1.  - case discussed with ID and will hold off on treatment at this time given low WBC.   - undetected CMV PCR 2/8, 2/15, 2/22, 3/8   - continue Valcyte 200 mg every Mon, Wed, Fri.  - Per ID, valcyte dose decreased to ppx dose on 2/18--Valcyte d/c'd 2/25  - Repeat scope 3/6 to assess persistent N/V and possible CMV causing these symptoms. Biopsies thus far normal, cmv stains negative.  - continue to monitor weekly CMV PCR's, last PCR neg from 3/15, recent level pending from 3/22.         Constipation    - chronic  - encourage ambulation, stool softeners and laxatives, often refuses some meds 2/2 nausea, will often require suppository or brown bomb enema to have a BM  - Small bowel follow through with normal findings        Anemia of chronic disease    - 1 U PRBC 3/12  - Monitor daily labs. H/H stable,  transfuse PRN.         Delayed surgical wound healing    - Wd vac removed 1/12/18.    - Wound healing well.   - Changing dressing q Tuesday, last 3/20        Seizure    - 3/1, patient with stuttering speech, trouble recalling specific words when speaking. Does have hx of seizure (on keppra) previously while on prograf. Switched to prograf over the weekend from cyclosporine. Neuro exam unremarkable.   - CT head without contrast obtained, unremarkable.   - In light of these symptoms, prograf d/c, restart cyclosporine 3/1.  - remains w/o seizure. Cont to have stutter/garbled speech/difficulty finding words.  Worse post EGD as sedation was given.  - MRI 3/6 unremarkable.  - EEG completed 3/8 - mild, generalized, non-specific cerebral dysfunction, no epileptiform activity  - monitor closely. VSS.        At risk for opportunistic infections    - see other cytomegaloviral disease  - Pentam given 2/24.  - D/C Isavu 3/21        Long-term use of immunosuppressant medication    - Maintenance IS with cyclosporine --> transitioned to Prograf 2/24 for continued nausea but did not tolerate from neuro standpoint.  PT IS NOT A CANDIDATE FOR TACROLIMUS.    - Transitioned back to cyclosporine 3/1  - D/c cyclosporine, started rapa & stress dose steroids 3/10 for continued confusion  - Mental status now improved, re-trial cyclosporine 3/13, d/c rapa 3/13  - Transition back to prednisone taper from stress dose steroids 3/15 --> will decrease by 5 mg q week.  - Restarted low dose MMF 3/23.         Prophylactic immunotherapy    - See long term use of immunosuppression.             VTE Risk Mitigation         Ordered     heparin (porcine) injection 1,000 Units  As needed (PRN)     Route:  Intra-Catheter        03/12/18 1134     heparin (porcine) injection 5,000 Units  Every 8 hours     Route:  Subcutaneous        03/08/18 1622     Medium Risk of VTE  Once      01/11/18 0351     Place sequential compression device  Until discontinued       01/11/18 0351          The patients clinical status was discussed at multidisplinary rounds, involving transplant surgery, transplant medicine, pharmacy, nursing, nutrition, and social work    Discharge Planning: not stable for discharge at this time.  Monitor kidney function --> may need outpt HD  Monitor HH needs vs rehab for deconditioned status      Shereen Mcguire DNP  Liver Transplant  [facility]

## 2018-03-23 NOTE — ASSESSMENT & PLAN NOTE
- HD resumed on previous admission. Anuric on admission and dialyzes M-W-F.  - Nephrology following.    - Goal is for kidney txp in future- currently not a candidate due to health status.  - urine output has improved over last several days since 3/16, around 900ml uop daily.    - HD held 3/21 & 3/23, trialed lasix but patient refused lasix, prefers diamox  - 24 hour CrCl in progress. Diamox this afternoon after collection completed.   - BP trending up this week likely 2/2 hypervolemia, should improve with diuresis, monitor.

## 2018-03-23 NOTE — PLAN OF CARE
Problem: Physical Therapy Goal  Goal: Physical Therapy Goal  Goals to be met by: 3/19/18     Patient will increase functional independence with mobility by performin. Supine to sit with Modified [place]. - GOAL MET  2. Sit to stand transfer with Minimal Assistance. - GOAL MET  3. Bed to chair transfer with Contact Guard Assistance using Rolling Walker. - GOAL MET  4. Gait  x 150 feet with Contact Guard Assistance using Rolling Walker. - GOAL MET    5. Sit to supine with Modified [place].  6. Lower extremity exercise program x 20 reps per handout, with assistance as needed.  7. Pt to perf 10x sit<>stand: 1 min 18 sec, to demonstrate improvements in B LE mm strength.  8. Pt to perf 6MWT: amb 566', to demonstrate a clinically significant improvement in aerobic capacity.    9. Pt to amb 150' with DOC YANCEY. - GOAL MET     Outcome: Ongoing (interventions implemented as appropriate)  Pt achieved 5 goals.

## 2018-03-23 NOTE — ASSESSMENT & PLAN NOTE
- CXR in ER with large right pleural effusion with subjective c/o worsening SOB.  - Multiple Thora's this admission, all non-infectious: 1/11, 1/19, 1/31, 2/16  - CT A/P 2/26 with large R pleural effusion, may need repeat thora if fevers reoccur.    - complaining of sob, obtain chest xray, trial dose of diamox 3/14 with minimal UOP  - chest xray with large pleural effusion 3/13 --> may need thora soon if becomes symptomatic or develops fever  - Fluid removal with HD and/or diamox when HD held.

## 2018-03-23 NOTE — PROGRESS NOTES
" [facility]  Adult Nutrition  Progress Note    SUMMARY       Recommendations    Recommendation/Intervention:   -Continue nocturnal TPN of 92g AA / 182g dex plus IV lipids nightly to providing 75% EEN and 100% EPN.   -Encourage oral intake. Calorie count from 3/22 indicated pt consumed ~250-300kcal.   -Once patient is consuming 700-1000kcal consistently, begin to wean TPN.    -If TF can be started sooner, recommend changed feeing method as to limit time patient spends on TPN. RD following.     Goals: 1. 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, complicate post-op w/ LAURA ongoing, chronic malnutrition, cirrhosis 2' EtOH  Interdisciplinary Rounds: attended  General Information Comments: nocturnal TPN continues, pt ate the following 2/22: 1/2 bag of peanuts, 1/2 twix bar, 5 chips, 1 tea with sugar (~250-300kcal), has been "trying" to eat but continues to feel nausea, food is not appetizing, encouraged patient to drink juice or ONS as well  Nutrition Discharge Planning: Unable to determine at this time    Nutrition Risk Screen    Nutrition Risk Screen: large or nonhealing wound, burn or pressure ulcer    Nutrition/Diet History    Patient Reported Diet/Restrictions/Preferences: low salt  Typical Food/Fluid Intake: eats very little at this time  Food Preferences: No cultural or Judaism food preferences noted  Do you have any cultural, spiritual, Judaism conflicts, given your current situation?: none stated  (Retired) Meal/Snack Patterns: pt likes to cook, when feeling well will cook and then only eat bites  Supplemental Drinks or Food Habits:  (protein powders sometimes)  (RETIRED) Functional Status: able to prepare meals, able to purchase food, " "ambulatory  Factors Affecting Nutritional Intake: altered gastrointestinal function, abdominal distention, decreased appetite, nausea/vomiting    Anthropometrics    Temp: 98.3 °F (36.8 °C)  Height Method: Stated  Height: 5' 5.98" (167.6 cm)  Height (inches): 65.98 in  Weight Method: Bed Scale  Weight: 72 kg (158 lb 11.7 oz)  Weight (lb): 158.73 lb  Ideal Body Weight (IBW), Male: 141.88 lb  % Ideal Body Weight, Male (lb): 112.68 lb  BMI (Calculated): 25.9  BMI Grade: 25 - 29.9 - overweight  Weight Loss: unintentional  Usual Body Weight (UBW), k kg (2017 )  % Usual Body Weight: 86.71  % Weight Change From Usual Weight: -13.47 %    Anthropometrics Special Consideration         Lab/Procedures/Meds    (RETIRED) Diagnostic Test/Procedure Review: reviewed, pertinent  Pertinent Labs Reviewed: reviewed  Pertinent Labs Comments: Cr 4.1, GFR 18.5, BUN 89, TBili 1.1  Pertinent Medications Reviewed: reviewed  Pertinent Medications Comments: psyllium, prednisone, reglan, heparin, bisacodyl, marinol, epoetin, ergocalciferol    Physical Findings/Assessment    Overall Physical Appearance: generalized wasting, loss of muscle mass, loss of subcutaneous fat, weak  Tubes:  (-)  Oral/Mouth Cavity: WDL  Skin: intact    Estimated/Assessed Needs    Weight Used For Calorie Calculations: 74.8 kg (164 lb 14.5 oz)  Height: 5' 5.98" (167.6 cm)  Energy Calorie Requirements (kcal): 1183-1113 (30-35 kcal/kg)  Energy Need Method: Kcal/kg  20 kcal/kg (kcal): 1496  25 kcal/kg (kcal): 1870  30 kcal/kg (kcal): 2244  35 kcal/kg (kcal): 2618  40 kcal/kg (kcal): 2992.01  45 kcal/kg (kcal): 3366.01  50 kcal/kg (kcal): 3740.01  RMR ([place]-St. Jeor Equation): 1660.5  Protein Requirements: 81-94g (1.3-1.5 g/kg)  Weight Used For Protein Calculations: 62.3 kg (137 lb 5.6 oz)  0.6 gm Protein (gm): 37.46  0.7 gm Protein (gm): 43.7  0.8 gm Protein (gm): 49.94  0.9 gm Protein (gm): 56.19  1.0 gm Protein (gm): 62.43  1.1 gm Protein (gm): 68.67  1.2 gm " Protein (gm): 74.92  1.3 gm Protein (gm): 81.16  1.4 gm Protein (gm): 87.4  1.5 gm Protein (gm): 93.65  1.6 gm Protein (gm): 99.89  1.7 gm Protein (gm): 106.13  1.8 gm Protein (gm): 112.37  1.9 gm Protein (gm): 118.62  2.0 gm Protein (gm): 124.86  2.1 gm Protein (gm): 131.1  2.2 gm Protein (gm): 137.35  2.3 gm Protein (gm): 143.59  2.4 gm Protein (gm): 149.83  2.5 gm Protein (gm): 156.08  Fluid Requirements (mL): 1mL/kcal  Fluid Need Method: RDA Method  RDA Method (mL): 1869       Nutrition Prescription Ordered    Current Diet Order: Regular  Nutrition Order Comments: -  Current Nutrition Support Formula Ordered:  (Custom TPN 92 AA / 182g dex plus IV lipids)  Current Nutrition Support Rate Ordered: 80 (ml)  Current Nutrition Support Frequency Ordered: mL/hr x 12 hrs  Oral Nutrition Supplement: Boost Breeze    Evaluation of Received Nutrient/Fluid Intake    Enteral Calories (kcal): 0  Enteral Protein (gm): 0  Enteral (Free Water) Fluid (mL): 0  Parenteral Calories (kcal): 987  Parenteral Protein (gm): 92  Parenteral Fluid (mL): 960  Lipid Calories (kcals): 500 kcals  GIR (Glucose Infusion Rate) (mg/kg/min): 3.38 mg/kg/min  Total Calories (kcal): 0  Total Calories (kcal/kg): 1487  % Kcal Needs: 71  % Protein Needs: 100  I/O: +1.2L since admit  Energy Calories Required: not meeting needs  Protein Required: meeting needs  Fluid Required: meeting needs  Comments: LBM 3/21  Tolerance: tolerating  % Intake of Estimated Energy Needs: 75 - 100 %  % Meal Intake: 0 - 25 %    Nutrition Risk    Level of Risk/Frequency of Follow-up:  (2x/week)     Assessment and Plan    Severe protein-calorie malnutrition    Nutrition Diagnosis  Inadequate oral intake    Related to (etiology):   Decreased appetite, nausea with eating    Signs and Symptoms (as evidenced by):   Pt eating 0%-10% of meals and relying on TPN nutrition for 100% of EPN, 75% EEN     Interventions/Recommendations (treatment strategy):  See recs    Nutrition Diagnosis  Status:   Continues             Monitor and Evaluation    Food and Nutrient Intake: energy intake, food and beverage intake  Food and Nutrient Adminstration: diet order  Knowledge/Beliefs/Attitudes: food and nutrition knowledge/skill  Physical Activity and Function: nutrition-related ADLs and IADLs  Anthropometric Measurements: weight, weight change, body mass index  Biochemical Data, Medical Tests and Procedures: electrolyte and renal panel, lipid profile, gastrointestinal profile, glucose/endocrine profile, inflammatory profile  Nutrition-Focused Physical Findings: overall appearance     Nutrition Follow-Up    RD Follow-up?: Yes

## 2018-03-23 NOTE — ASSESSMENT & PLAN NOTE
- detected at 370 on CMV PCR 2/1.  - case discussed with ID and will hold off on treatment at this time given low WBC.   - undetected CMV PCR 2/8, 2/15, 2/22, 3/8   - continue Valcyte 200 mg every Mon, Wed, Fri.  - Per ID, valcyte dose decreased to ppx dose on 2/18--Valcyte d/c'd 2/25  - Repeat scope 3/6 to assess persistent N/V and possible CMV causing these symptoms. Biopsies thus far normal, cmv stains negative.  - continue to monitor weekly CMV PCR's, last PCR neg from 3/15, recent level pending from 3/22.

## 2018-03-23 NOTE — PLAN OF CARE
Problem: Patient Care Overview  Goal: Plan of Care Review  Pt aaox4 vswn per pt. Bed in low position and callbell within reach. Mother at bedside. tpn @80cc/hr and lipids @21cc/hr started at 2300 noctural to run till bag finished. 24 hr urine started 2100 3/22. No c/o n/v but abd pain with moderate relief after pain med given. Refusing heparin sq and laxatives. Ambulates with standby assist.

## 2018-03-23 NOTE — PT/OT/SLP PROGRESS
"Physical Therapy Treatment    Patient Name:  Jhonny Diana   MRN:  [id number]    Recommendations:     Discharge Recommendations:  outpatient PT   Discharge Equipment Recommendations: bath bench   Barriers to discharge: None    Assessment:     Jhonny Diana is a 27 y.o. male admitted with a medical diagnosis of Fever.  He presents with the following impairments/functional limitations:  weakness, impaired endurance, impaired balance, gait instability, decreased lower extremity function, impaired cardiopulmonary response to activity.  Pt has progressed to amb with a straight cane, rather than use of a RW.  Addressed high level balance training with use of cane.  Pt able to complete 6MWT.  Encouraging pt and mother to amb in hallways with cane, though PT continues to see pt in w/c with mother pushing him around.  Change to POC to reduce frequency to 2xs/wk.  Recommendation for pt to receive skilled PT services in the outpatient setting.  Pt will benefit from acute skilled PT services to address these deficits and reach maximum level of function.      Recent Surgery: Procedure(s) (LRB):  ERCP (N/A)  ULTRASOUND-ENDOSCOPIC-UPPER (N/A) 14 Days Post-Op    Plan:     During this hospitalization, patient to be seen 2 x/week to address the above listed problems via gait training, therapeutic activities, therapeutic exercises, neuromuscular re-education  · Plan of Care Expires:  04/05/18   Plan of Care Reviewed with: patient, mother    Subjective     Communicated with nursing prior to session.  Patient found sitting in chair upon PT entry to room, agreeable to treatment.      "I don't think the cane will help me."  "I think the cane helps a bit."    Chief Complaint: facial swelling  Patient comments/goals: to go home  Pain/Comfort:  · Pain Rating 1: 0/10    Patients cultural, spiritual, Methodist conflicts given the current situation: no    Objective:     Patient found with: central line, telemetry     General Precautions: Standard, " fall   Orthopedic Precautions:N/A   Braces: N/A     Functional Mobility:    · Transfers:     · Sit to Stand:  independence with no AD  · Bed to Chair: independence with a straight cane  using  Stand Pivot    · Gait: Pt amb 600' + 800', S with CGA initially, with use of straight cane for support; High level balance training: vertical and horizontal head turns; stop/go; fast/slow    · Balance: S: dynamic standing balance with AD      AM-PAC 6 CLICK MOBILITY  Turning over in bed (including adjusting bedclothes, sheets and blankets)?: 4  Sitting down on and standing up from a chair with arms (e.g., wheelchair, bedside commode, etc.): 4  Moving from lying on back to sitting on the side of the bed?: 4  Moving to and from a bed to a chair (including a wheelchair)?: 4  Need to walk in hospital room?: 3  Climbing 3-5 steps with a railing?: 3  Total Score: 22       Patient left up in chair with all lines intact, call button in reach and mother present.    GOALS:    Physical Therapy Goals        Problem: Physical Therapy Goal    Goal Priority Disciplines Outcome Goal Variances Interventions   Physical Therapy Goal     PT/OT, PT Ongoing (interventions implemented as appropriate)     Description:  Goals to be met by: 18     Patient will increase functional independence with mobility by performin. Supine to sit with Modified [place]. - GOAL MET  2. Sit to stand transfer with Minimal Assistance. - GOAL MET  3. Bed to chair transfer with Contact Guard Assistance using Rolling Walker. - GOAL MET  4. Gait  x 150 feet with Contact Guard Assistance using Rolling Walker. - GOAL MET    5. Sit to supine with Modified [place].  6. Lower extremity exercise program x 20 reps per handout, with assistance as needed.  7. Pt to perf 10x sit<>stand: 1 min 18 sec, to demonstrate improvements in B LE mm strength.  8. Pt to perf 6MWT: amb 566', to demonstrate a clinically significant improvement in aerobic capacity.    9. Pt to  amb 150' with NAYE RW. - GOAL MET                        Time Tracking:     PT Received On: 03/23/18  PT Start Time: 1658     PT Stop Time: 1721  PT Total Time (min): 23 min     Billable Minutes: Gait Training 23    Treatment Type: Treatment  PT/PTA: PT     PTA Visit Number: 1     Tory Amato, PT  03/23/2018

## 2018-03-23 NOTE — ASSESSMENT & PLAN NOTE
- Maintenance IS with cyclosporine --> transitioned to Prograf 2/24 for continued nausea but did not tolerate from neuro standpoint.  PT IS NOT A CANDIDATE FOR TACROLIMUS.    - Transitioned back to cyclosporine 3/1  - D/c cyclosporine, started rapa & stress dose steroids 3/10 for continued confusion  - Mental status now improved, re-trial cyclosporine 3/13, d/c rapa 3/13  - Transition back to prednisone taper from stress dose steroids 3/15 --> will decrease by 5 mg q week.  - Restarted low dose MMF 3/23.

## 2018-03-23 NOTE — PLAN OF CARE
Problem: Patient Care Overview  Goal: Plan of Care Review  Outcome: Ongoing (interventions implemented as appropriate)  Pt AAOx4. VSS.    Pt c/o pain to abdomin and back.  Oxy 5 mg given q4h.  Moderate relief obtained.  Appetite remains minimal.  Pt not able to eat at all today.  Last PO intake was an apple yesterday evening.  PO intake encouraged. Marinol and reglan still on board.  Nocturnal TPN to be run for 12 hours starting at 2100.    HD still on hold for now.  725 cc clear yellow colored urine output so far this shift.  24hr urine to be obtained.  Pt refusing heparin.  Activity encouraged.  Pt seen walking in room and hallway with mother.    Bed lowered and locked in place.  Call bell in reach. Personal items at bedside. Side rails up x 2.  No acute events/falls/injuries.  See flowsheet for further assessment findings.  Will monitor.  Discharge pending improvement in nutrition status.

## 2018-03-23 NOTE — PLAN OF CARE
Problem: Physical Therapy Goal  Goal: Physical Therapy Goal  Goals to be met by: 18     Patient will increase functional independence with mobility by performin. Supine to sit with Modified [place]. - GOAL MET  2. Sit to stand transfer with Minimal Assistance. - GOAL MET  3. Bed to chair transfer with Contact Guard Assistance using Rolling Walker. - GOAL MET  4. Gait  x 150 feet with Contact Guard Assistance using Rolling Walker. - GOAL MET    5. Sit to supine with Modified [place].  6. Lower extremity exercise program x 20 reps per handout, with assistance as needed.  7. Pt to perf 10x sit<>stand: 1 min 18 sec, to demonstrate improvements in B LE mm strength.  8. Pt to perf 6MWT: amb 566', to demonstrate a clinically significant improvement in aerobic capacity.    9. Pt to amb 150' with DOC YANCEY. - GOAL MET      Outcome: Ongoing (interventions implemented as appropriate)  Goals exp date updated.

## 2018-03-23 NOTE — ASSESSMENT & PLAN NOTE
- Poor PO intake since transplant requiring short course of TPN during previous hospital stay.   - Due to N/V and abdominal pain, multiple attempts were made to place SHANE tube with tube feeding, but patient did not tolerate TF  - Transitioned to TPN 1/16 then again on 2/24 due to poor tolerance of SHANE tube/TF  - prealbumin 7 on 1/15 --> prealbumin 22 on 3/19, monitoring weekly (Mondays)  - EGD performed 1/25 and 3/6, gastroparesis, duodenal ulcer, and congested/erythematous gastric mucosa  - Small bowel through with normal findings.  - Consulted GI for GJ tube placement, but was canceled due to ongoing fevers  - Hold Reglan and Marinol 3/8 for neuro symptoms --> restarted reglan and marinol 3/19, will need to dc marinol before discharge because not covered by insurance  - set goals for caloric intake with patient, will try nocturnal TPN started 3/20 PM to encourage appetite during the day  - Trying to set small caloric goals daily, for now goal is 300-500 jalen/day. Ultimately needs to consume 700 jalen/day to start weaning TPN.

## 2018-03-23 NOTE — ASSESSMENT & PLAN NOTE
Nutrition Diagnosis  Inadequate oral intake    Related to (etiology):   Decreased appetite, nausea with eating    Signs and Symptoms (as evidenced by):   Pt eating 0%-10% of meals and relying on TPN nutrition for 100% of EPN, 75% EEN     Interventions/Recommendations (treatment strategy):  See recs    Nutrition Diagnosis Status:   Continues

## 2018-03-23 NOTE — SUBJECTIVE & OBJECTIVE
Scheduled Meds:   acetaZOLAMIDE (DIAMOX) IVPB  500 mg Intravenous Once    bisacodyl  10 mg Oral Daily    cycloSPORINE modified  125 mg Oral BID    docusate sodium  100 mg Oral BID    dronabinol  2.5 mg Oral QHS    epoetin maurisio (PROCRIT) injection  12,600 Units Intravenous Every Mon, Wed, Fri    ergocalciferol  50,000 Units Oral Q7 Days    fat emulsion 20%  250 mL Intravenous Once    heparin (porcine)  5,000 Units Subcutaneous Q8H    levetiracetam oral soln  500 mg Oral BID    levothyroxine  75 mcg Oral Before breakfast    metoclopramide HCl  10 mg Oral QID (AC & HS)    mycophenolate  500 mg Oral BID    omeprazole  40 mg Oral QAM    polyethylene glycol  17 g Oral BID    predniSONE  15 mg Oral Daily    psyllium husk (aspartame)  3.4 g Oral BID    ursodiol  300 mg Oral BID     Continuous Infusions:   TPN ADULT CENTRAL LINE CUSTOM       PRN Meds:sodium chloride 0.9%, acetaminophen, albuterol-ipratropium 2.5mg-0.5mg/3mL, bisacodyl, butalbital-acetaminophen-caffeine -40 mg, dextrose 50%, dextrose 50%, diphenhydrAMINE-zinc acetate 1-0.1%, glucagon (human recombinant), glucose, glucose, heparin (porcine), naloxone, ondansetron, ondansetron, oxyCODONE, oxyCODONE, prochlorperazine, simethicone, sodium chloride 0.9%, sodium chloride 0.9%    Review of Systems   Constitutional: Positive for activity change, appetite change and fatigue. Negative for fever.   HENT: Negative.  Negative for facial swelling.    Eyes: Negative.    Respiratory: Negative.  Negative for apnea, chest tightness, shortness of breath and wheezing.    Cardiovascular: Negative.  Negative for chest pain, palpitations and leg swelling.   Gastrointestinal: Positive for abdominal distention, abdominal pain and constipation. Negative for diarrhea, nausea and vomiting.   Genitourinary: Positive for decreased urine volume. Negative for difficulty urinating, dysuria, hematuria and urgency.   Musculoskeletal: Negative.  Negative for back pain,  gait problem, neck pain and neck stiffness.   Skin: Negative.  Negative for color change and pallor.   Allergic/Immunologic: Positive for immunocompromised state.   Neurological: Negative.  Negative for dizziness, seizures, weakness, light-headedness and headaches.   Psychiatric/Behavioral: Positive for dysphoric mood. Negative for behavioral problems, confusion, hallucinations, sleep disturbance and suicidal ideas. The patient is not nervous/anxious.      Objective:     Vital Signs (Most Recent):  Temp: 98.3 °F (36.8 °C) (03/23/18 1202)  Pulse: 97 (03/23/18 1202)  Resp: 18 (03/23/18 1202)  BP: (!) 157/95 (03/23/18 1202)  SpO2: 95 % (03/23/18 1202) Vital Signs (24h Range):  Temp:  [98.3 °F (36.8 °C)-99.4 °F (37.4 °C)] 98.3 °F (36.8 °C)  Pulse:  [84-97] 97  Resp:  [18-20] 18  SpO2:  [95 %-99 %] 95 %  BP: (104-162)/(55-95) 157/95     Weight: 72 kg (158 lb 11.7 oz)  Body mass index is 25.63 kg/m².    Intake/Output - Last 3 Shifts       03/21 0700 - 03/22 0659 03/22 0700 - 03/23 0659 03/23 0700 - 03/24 0659    P.O. 740 640     I.V. (mL/kg) 0 (0) 0 (0)     Other 0 0     IV Piggyback       TPN 1243.6 904     Total Intake(mL/kg) 1983.6 (27.5) 1544 (21.4)     Urine (mL/kg/hr) 950 (0.5) 900 (0.5)     Emesis/NG output 0 (0)      Other 0 (0)      Stool 0 (0) 0 (0)     Blood 0 (0)      Total Output 950 900      Net +1033.6 +644             Urine Occurrence 0 x 0 x     Stool Occurrence 0 x 0 x     Emesis Occurrence 0 x            Physical Exam   Constitutional: He is oriented to person, place, and time. He appears well-developed.   Hand and temporal muscle wasting   HENT:   Head: Normocephalic.   Eyes: Pupils are equal, round, and reactive to light. No scleral icterus.   Cardiovascular: Normal rate, regular rhythm, normal heart sounds and intact distal pulses.    Pulmonary/Chest: Effort normal. No respiratory distress. He has decreased breath sounds in the right lower field and the left lower field. He has no wheezes. He has no  rhonchi. He has no rales.   Abdominal: Soft. Bowel sounds are normal. He exhibits distension. There is tenderness in the right upper quadrant.   Musculoskeletal: Normal range of motion. He exhibits edema (generalized).   Neurological: He is alert and oriented to person, place, and time.   Skin: Skin is warm and dry.   Psychiatric: He has a normal mood and affect. His behavior is normal. Judgment and thought content normal.   Nursing note and vitals reviewed.      Laboratory:  Immunosuppressants         Stop Route Frequency     mycophenolate capsule 500 mg      -- Oral 2 times daily     cycloSPORINE modified capsule 125 mg      -- Oral 2 times daily     cycloSPORINE modified (NEORAL) 25 MG capsule      03/09 1714       cycloSPORINE modified (NEORAL) 100 MG capsule      03/09 1714       cycloSPORINE modified (NEORAL) 100 MG capsule      03/09 0514       cycloSPORINE modified (NEORAL) 25 MG capsule      03/09 0514          CBC:   Recent Labs  Lab 03/23/18  0431   WBC 6.34   RBC 2.46*   HGB 8.0*   HCT 24.9*   *   *   MCH 32.5*   MCHC 32.1     CMP:   Recent Labs  Lab 03/23/18  0431   GLU 97   CALCIUM 8.8   ALBUMIN 2.5*   PROT 5.5*      K 4.0   CO2 22*      BUN 89*   CREATININE 4.1*   ALKPHOS 103   ALT 6*   AST 21   BILITOT 1.1*     Coagulation: No results for input(s): PT, INR, APTT in the last 168 hours.  Labs within the past 24 hours have been reviewed.    Diagnostic Results:  I have personally reviewed all pertinent imaging studies.

## 2018-03-23 NOTE — PLAN OF CARE
Problem: Patient Care Overview  Goal: Plan of Care Review  Outcome: Ongoing (interventions implemented as appropriate)  Pt is AAOx4, stand by assist, and VSS. Managed abd pain with Oxycodone 5mg PO X2. Diamox administered. Pt ambulated in hallway. Heparin refused. Pt is up in chair, call bell within reach, and nonskid socks on. Pt's mother at bedside.

## 2018-03-24 LAB
ALBUMIN SERPL BCP-MCNC: 2.6 G/DL
ALP SERPL-CCNC: 111 U/L
ALT SERPL W/O P-5'-P-CCNC: 6 U/L
ANION GAP SERPL CALC-SCNC: 14 MMOL/L
ANISOCYTOSIS BLD QL SMEAR: SLIGHT
AST SERPL-CCNC: 23 U/L
BASO STIPL BLD QL SMEAR: ABNORMAL
BASOPHILS NFR BLD: 0 %
BILIRUB SERPL-MCNC: 1.1 MG/DL
BUN SERPL-MCNC: 102 MG/DL
CALCIUM SERPL-MCNC: 9.3 MG/DL
CHLORIDE SERPL-SCNC: 102 MMOL/L
CO2 SERPL-SCNC: 23 MMOL/L
CREAT CL/1.73 SQ M 12H UR+SERPL-ARVRAT: 6 ML/MIN
CREAT SERPL-MCNC: 4.3 MG/DL
CREAT SERPL-MCNC: 4.4 MG/DL
CREAT UR-MCNC: 33 MG/DL
CREATININE, URINE (MG/SPEC): 363 MG/SPEC
CYCLOSPORINE BLD LC/MS/MS-MCNC: 260 NG/ML
DIFFERENTIAL METHOD: ABNORMAL
EOSINOPHIL NFR BLD: 0 %
ERYTHROCYTE [DISTWIDTH] IN BLOOD BY AUTOMATED COUNT: 18.6 %
EST. GFR  (AFRICAN AMERICAN): 20.2 ML/MIN/1.73 M^2
EST. GFR  (NON AFRICAN AMERICAN): 17.5 ML/MIN/1.73 M^2
GLUCOSE SERPL-MCNC: 93 MG/DL
HCT VFR BLD AUTO: 26.4 %
HGB BLD-MCNC: 8.4 G/DL
HYPOCHROMIA BLD QL SMEAR: ABNORMAL
IMM GRANULOCYTES # BLD AUTO: ABNORMAL K/UL
IMM GRANULOCYTES NFR BLD AUTO: ABNORMAL %
LYMPHOCYTES NFR BLD: 19 %
MAGNESIUM SERPL-MCNC: 2.5 MG/DL
MCH RBC QN AUTO: 32.6 PG
MCHC RBC AUTO-ENTMCNC: 31.8 G/DL
MCV RBC AUTO: 102 FL
MONOCYTES NFR BLD: 7 %
NEUTROPHILS NFR BLD: 72 %
NEUTS BAND NFR BLD MANUAL: 2 %
NRBC BLD-RTO: 0 /100 WBC
OVALOCYTES BLD QL SMEAR: ABNORMAL
PHOSPHATE SERPL-MCNC: 3.9 MG/DL
PLATELET # BLD AUTO: 98 K/UL
PLATELET BLD QL SMEAR: ABNORMAL
PMV BLD AUTO: 12.5 FL
POIKILOCYTOSIS BLD QL SMEAR: SLIGHT
POLYCHROMASIA BLD QL SMEAR: ABNORMAL
POTASSIUM SERPL-SCNC: 4.1 MMOL/L
PROT SERPL-MCNC: 6 G/DL
RBC # BLD AUTO: 2.58 M/UL
SODIUM SERPL-SCNC: 139 MMOL/L
TARGETS BLD QL SMEAR: ABNORMAL
URINE COLLECTION DURATION: 24 HR
URINE VOLUME: 1100 ML
WBC # BLD AUTO: 5.65 K/UL

## 2018-03-24 PROCEDURE — 80158 DRUG ASSAY CYCLOSPORINE: CPT

## 2018-03-24 PROCEDURE — 20600001 HC STEP DOWN PRIVATE ROOM

## 2018-03-24 PROCEDURE — 63600175 PHARM REV CODE 636 W HCPCS: Performed by: NURSE PRACTITIONER

## 2018-03-24 PROCEDURE — 85027 COMPLETE CBC AUTOMATED: CPT

## 2018-03-24 PROCEDURE — 94664 DEMO&/EVAL PT USE INHALER: CPT

## 2018-03-24 PROCEDURE — 99233 SBSQ HOSP IP/OBS HIGH 50: CPT | Mod: ,,, | Performed by: NURSE PRACTITIONER

## 2018-03-24 PROCEDURE — A4217 STERILE WATER/SALINE, 500 ML: HCPCS | Performed by: NURSE PRACTITIONER

## 2018-03-24 PROCEDURE — B4185 PARENTERAL SOL 10 GM LIPIDS: HCPCS | Performed by: NURSE PRACTITIONER

## 2018-03-24 PROCEDURE — 25000003 PHARM REV CODE 250: Performed by: NURSE PRACTITIONER

## 2018-03-24 PROCEDURE — 85007 BL SMEAR W/DIFF WBC COUNT: CPT

## 2018-03-24 PROCEDURE — 80053 COMPREHEN METABOLIC PANEL: CPT

## 2018-03-24 PROCEDURE — 83735 ASSAY OF MAGNESIUM: CPT

## 2018-03-24 PROCEDURE — 84100 ASSAY OF PHOSPHORUS: CPT

## 2018-03-24 PROCEDURE — 25000003 PHARM REV CODE 250: Performed by: PHYSICIAN ASSISTANT

## 2018-03-24 PROCEDURE — 99900035 HC TECH TIME PER 15 MIN (STAT)

## 2018-03-24 RX ADMIN — LEVETIRACETAM 500 MG: 100 SOLUTION ORAL at 09:03

## 2018-03-24 RX ADMIN — SOYBEAN OIL 250 ML: 20 INJECTION, SOLUTION INTRAVENOUS at 09:03

## 2018-03-24 RX ADMIN — METOCLOPRAMIDE HYDROCHLORIDE 10 MG: 5 SOLUTION ORAL at 09:03

## 2018-03-24 RX ADMIN — URSODIOL 300 MG: 300 CAPSULE ORAL at 09:03

## 2018-03-24 RX ADMIN — URSODIOL 300 MG: 300 CAPSULE ORAL at 08:03

## 2018-03-24 RX ADMIN — MAGNESIUM SULFATE HEPTAHYDRATE: 500 INJECTION, SOLUTION INTRAMUSCULAR; INTRAVENOUS at 09:03

## 2018-03-24 RX ADMIN — TORSEMIDE 50 MG: 20 TABLET ORAL at 11:03

## 2018-03-24 RX ADMIN — PROCHLORPERAZINE EDISYLATE 10 MG: 5 INJECTION INTRAMUSCULAR; INTRAVENOUS at 09:03

## 2018-03-24 RX ADMIN — PREDNISONE 15 MG: 5 TABLET ORAL at 08:03

## 2018-03-24 RX ADMIN — OMEPRAZOLE 40 MG: 40 CAPSULE, DELAYED RELEASE ORAL at 07:03

## 2018-03-24 RX ADMIN — METOCLOPRAMIDE HYDROCHLORIDE 10 MG: 5 SOLUTION ORAL at 06:03

## 2018-03-24 RX ADMIN — METOCLOPRAMIDE HYDROCHLORIDE 10 MG: 5 SOLUTION ORAL at 11:03

## 2018-03-24 RX ADMIN — LEVOTHYROXINE SODIUM 75 MCG: 75 TABLET ORAL at 06:03

## 2018-03-24 RX ADMIN — CYCLOSPORINE 125 MG: 100 CAPSULE, LIQUID FILLED ORAL at 05:03

## 2018-03-24 RX ADMIN — OXYCODONE HYDROCHLORIDE 5 MG: 5 TABLET ORAL at 05:03

## 2018-03-24 RX ADMIN — BISACODYL 10 MG: 5 TABLET, COATED ORAL at 08:03

## 2018-03-24 RX ADMIN — MYCOPHENOLATE MOFETIL 500 MG: 250 CAPSULE ORAL at 09:03

## 2018-03-24 RX ADMIN — MYCOPHENOLATE MOFETIL 500 MG: 250 CAPSULE ORAL at 08:03

## 2018-03-24 RX ADMIN — OXYCODONE HYDROCHLORIDE 5 MG: 5 TABLET ORAL at 09:03

## 2018-03-24 RX ADMIN — DRONABINOL 2.5 MG: 2.5 CAPSULE ORAL at 09:03

## 2018-03-24 RX ADMIN — DOCUSATE SODIUM 100 MG: 100 CAPSULE, LIQUID FILLED ORAL at 08:03

## 2018-03-24 RX ADMIN — DOCUSATE SODIUM 100 MG: 100 CAPSULE, LIQUID FILLED ORAL at 09:03

## 2018-03-24 RX ADMIN — CYCLOSPORINE 125 MG: 100 CAPSULE, LIQUID FILLED ORAL at 08:03

## 2018-03-24 RX ADMIN — METOCLOPRAMIDE HYDROCHLORIDE 10 MG: 5 SOLUTION ORAL at 05:03

## 2018-03-24 NOTE — ASSESSMENT & PLAN NOTE
LAURA dialysisi dependant since 1016/2017  HD on [facility] under the care of Dr. Peterson  On HD for: 2.5 mo since 10/16/2017  Duration of outpatient dialysis session - 3.5 hrs  EDW - TBD  Residual Renal Function - yes   -Increase in UOP over weekend-Continue to avg UOP 90 0ml/24 hrs. Cr 2.5>3.3>3.7>4.1 last 4 days. Refused lasix but agrees to to torsemide.     -Hold HD today. Strict IO. Daily Weights. Last HD Mon 3/19.   -24 hr creatinine clearance in progress  -Okay to start torsemide 50 mg daily 3/24 to assist with volume management.      Anemia of CKD  -Increase Epogen by 25%. Please give even if not going to dialysis.

## 2018-03-24 NOTE — ASSESSMENT & PLAN NOTE
- chronic  - encourage ambulation, stool softeners and laxatives, often refuses some meds 2/2 nausea, will often require suppository or brown bomb enema to have a BM.  - Small bowel follow through with normal findings.

## 2018-03-24 NOTE — ASSESSMENT & PLAN NOTE
- Maintenance IS with cyclosporine --> transitioned to Prograf 2/24 for continued nausea but did not tolerate from neuro standpoint.  PT IS NOT A CANDIDATE FOR TACROLIMUS.    - Transitioned back to cyclosporine 3/1  - D/c cyclosporine, started rapa & stress dose steroids 3/10 for continued confusion  - Mental status now improved, re-trial cyclosporine 3/13, d/c rapa 3/13  - Transition back to prednisone taper from stress dose steroids 3/15 --> will decrease by 5 mg q week.  - Restarted low dose MMF 3/23. Pt tolerating well.

## 2018-03-24 NOTE — ASSESSMENT & PLAN NOTE
- CXR in ER with large right pleural effusion with subjective c/o worsening SOB.  - Multiple Thora's this admission, all non-infectious: 1/11, 1/19, 1/31, 2/16  - CT A/P 2/26 with large R pleural effusion, may need repeat thora if fevers reoccur.    - complaining of sob, obtain chest xray, trial dose of diamox 3/14 with minimal UOP  - chest xray with large pleural effusion 3/13 --> may need thora soon if becomes symptomatic or develops fever.  - Fluid removal with HD and/or diamox when HD held.

## 2018-03-24 NOTE — PROGRESS NOTES
[facility]  Liver Transplant  Progress Note    Patient Name: Jhonny Diana  MRN: [id number]  Admission Date: 2018  Hospital Length of Stay: 72 days  Code Status: Full Code  Primary Care Provider: Primary Doctor No  Post-Operative Day: 156    ORGAN:   LIVER  Disease Etiology: Acute Alcoholic Hepatitis  Donor Type:    - Brain Death  CDC High Risk:   No  Donor CMV Status:   Donor CMV Status: Positive  Donor HBcAB:   Negative  Donor HCV Status:   Negative  Whole or Partial: Whole Liver  Biliary Anastomosis: End to End  Arterial Anatomy: Standard  Subjective:     History of Present Illness:  Jhonny Diana is a 26 y/o male with past medical history of alcoholic cirrhosis.  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 Found to have peritonitis in November (WBC 5000, 75% PNM) neg for bile leak. He was treated initially with vanc/cefepime. Repeat cell counts  with some improvement (WBC 1400, 45% PNM). He has undergone multiple paracenteses as well as abscess drainage of perihepatic fluid collections and treated with antimicrobial therapy but no positive cultures. Of note, biliary stricture also identified and ERCP performed on 2017 with sphincterotomy and biliary stent placed. Liver tests still have not normalized despite intervention, bilirubin and AP remain elevated. Other pertinent PMH current wound vac in place 2/2 wound infection, malnutrition requiring TPN for short course and ongoing hypoalbuminemia, and seizure activity while on prograf and has since been switched to cyclosporine without reoccurrence.  He presented to the ER for fever, abdominal pain, and N/V. He reports fever (103) for 1  day prior. Overnight, he developed N/V, reports small amount of green emesis with new left sided pain. He also endorses worsening SOB with exertion. He was scheduled as an outpatient for follow up paracentesis and IR drainage of fluid collection. CXR in ER shows large pleural effusion with subsegmental atelectasis. Infectious work up initiated in ER. His ANC is 900. Broad spectrum antibiotics initiated in ED. At admit, he denied chest pain, palpitations, diarrhea, constipation, back pain, or any sick contacts.    Hospital Course:  Unclear cause of ongoing fevers. Multiple infectious, diagnostic testing completed. Taken to OR for ex lap 2/6, loculated fluid collections found, cultures NGTD. Multiple blood cultures with NGTD. Thoracentesis 1/11, 1/18, 1/31, and 2/16, cultures NGTD. Paracentesis 1/11, 1/19, and 2/5, cultures NGTD. negative fungal markers/quant gold intermediate. Negative TTE.  IR drainage 1/11, cultures NGTD. Continued with intermittent fevers despite broad spectrum antibiotics that were initially started 1/11, then broadened antibiotics 2/22 from vanc/zosyn to ertapenem. Multiple CT scan of abd obtained that were unremarkable. Tagged WBCs scan 2/27 without source of infection found. He had several episodes of neutropenia requiring neupogen, last given 2/28. 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 Monitoring weekly CMV PCR. Chimerism studies obtained 3/1 to assess for GVHD, which was negative, EBV PCR negative, adenovirus negative. In addition to fevers, he has consistently c/o abdominal pain and N/V. Tbili has remain elevated post-op requiring multiple ERCPs. ERCPs performed this admission on 1/30, 2/19, and 3/9. ERCPs with sludge and stones with stents placed. Last ERCP 3/9 with metal stent placed into CBD and stones removed. His ertapenem was d/cd 3/7 due to AMS. Following ERCP 3/9, he was started on cefepime and flagyl  for possible cholangitis. He has remained afebrile since 3/10. He completed cefepime/flagyl after 3 weeks on 3/19 & 3/20. Due to elevated enzymes, he had a liver biopsy 1/23 consistent with cholestatic hepatitis (no cultures obtained during biopsy). GI was consulted for persistent N/V and malnutrition. EGD performed 1/25 and 3/6, EGD 3/6 with normal esophagus congested, erythematous and nodular mucosa in the stomach, one duodenal ulcer with a clean ulcer base (Emir Class III), biopsies taken, negative for CMV and GVHD. Due to N/V and abdominal pain, multiple attempts were made to place SHANE tube with tube feeding, but patient did not tolerate TF. Therefore, he was transitioned to TPN 1/16 then again on 2/24. He also has chronic constipation. Small bowel follow through study with normal findings. Following EGD 1/25, he was started on a PPI, miralax, and Metamucil, but he did not tolerate meds. For appetite stimulation he was trialed on marinol. He also did well with reglan before meals and at bedtime. Early post-op liver transplant surgery, he was switched to cyclosporine for seizures. During this admission, he was transitioned back to prograf 2/24 with keppra on board. Starting 3/1 he developed stuttering speech, disorientation. CT head 3/1 and MRI head 3/6 unremarkable. EEG 3/8 with no seizure activity. He started on rapamune and stress dose steroids 3/10, then ultimately returned to cyclosporine 3/13 and rapamune d/cd same day. Since transitioning back to cyclosporine, his mental status has remained stable. During admission, patient was evaluated by [facility] for future kidney transplant, and he was deemed to be a suitable candidate once medically stable and cleared. He dialyzes on a M-W-F schedule. He was anuric on admission, but starting producing urine around 3/16 and continues to produce around 800-900cc daily.     Interval History: no acute events overnight. Slowly trying to improve caloric intake, goal 300-500  jalen/day, goal is to wean TPN once 700 jalen/day. Continue nocturnal TPN/Lipids for now. Continue reglan and marinol. Last HD Monday 3/19 (MWF scheduled, held WF). Cr 4.3 from 4.4.  ml/day. Diamox given after 24 CrCl collection yesterday. Nephrology following. Will start torsemide 50 mg daily. He remains afebrile off antibiotics. No issues with restarted cellcept 500 mg bid yesterday. Monitor.     Scheduled Meds:   bisacodyl  10 mg Oral Daily    cycloSPORINE modified  125 mg Oral BID    docusate sodium  100 mg Oral BID    dronabinol  2.5 mg Oral QHS    epoetin maurisio (PROCRIT) injection  12,600 Units Intravenous Every Mon, Wed, Fri    ergocalciferol  50,000 Units Oral Q7 Days    fat emulsion 20%  250 mL Intravenous Once    heparin (porcine)  5,000 Units Subcutaneous Q8H    levetiracetam oral soln  500 mg Oral BID    levothyroxine  75 mcg Oral Before breakfast    metoclopramide HCl  10 mg Oral QID (AC & HS)    mycophenolate  500 mg Oral BID    omeprazole  40 mg Oral QAM    polyethylene glycol  17 g Oral BID    predniSONE  15 mg Oral Daily    psyllium husk (aspartame)  3.4 g Oral BID    torsemide  50 mg Oral Daily    ursodiol  300 mg Oral BID     Continuous Infusions:   TPN ADULT CENTRAL LINE CUSTOM       PRN Meds:sodium chloride 0.9%, acetaminophen, albuterol-ipratropium 2.5mg-0.5mg/3mL, bisacodyl, butalbital-acetaminophen-caffeine -40 mg, dextrose 50%, dextrose 50%, diphenhydrAMINE-zinc acetate 1-0.1%, glucagon (human recombinant), glucose, glucose, heparin (porcine), naloxone, ondansetron, ondansetron, oxyCODONE, oxyCODONE, prochlorperazine, simethicone, sodium chloride 0.9%, sodium chloride 0.9%    Review of Systems   Constitutional: Positive for activity change, appetite change and fatigue. Negative for chills and fever.   HENT: Negative for congestion and facial swelling.    Eyes: Negative for pain, discharge and visual disturbance.   Respiratory: Negative.  Negative for cough, chest  tightness, shortness of breath and wheezing.    Cardiovascular: Negative.  Negative for chest pain, palpitations and leg swelling.   Gastrointestinal: Positive for abdominal distention, abdominal pain and constipation. Negative for diarrhea, nausea and vomiting.   Endocrine: Negative.    Genitourinary: Negative for decreased urine volume, difficulty urinating, dysuria, hematuria and urgency.   Musculoskeletal: Negative for back pain, gait problem, neck pain and neck stiffness.   Skin: Negative for color change and pallor.   Allergic/Immunologic: Positive for immunocompromised state.   Neurological: Negative for dizziness, seizures, weakness, light-headedness and headaches.   Psychiatric/Behavioral: Positive for dysphoric mood. Negative for behavioral problems, confusion, hallucinations, sleep disturbance and suicidal ideas. The patient is not nervous/anxious.      Objective:     Vital Signs (Most Recent):  Temp: 98.6 °F (37 °C) (03/24/18 1138)  Pulse: 104 (03/24/18 1138)  Resp: 18 (03/24/18 1138)  BP: (!) 143/85 (03/24/18 1138)  SpO2: 98 % (03/24/18 1138) Vital Signs (24h Range):  Temp:  [97.6 °F (36.4 °C)-98.7 °F (37.1 °C)] 98.6 °F (37 °C)  Pulse:  [] 104  Resp:  [15-18] 18  SpO2:  [94 %-98 %] 98 %  BP: (143-160)/(85-92) 143/85     Weight: 72.7 kg (160 lb 4.4 oz)  Body mass index is 25.88 kg/m².    Intake/Output - Last 3 Shifts       03/22 0700 - 03/23 0659 03/23 0700 - 03/24 0659 03/24 0700 - 03/25 0659    P.O. 640 460     I.V. (mL/kg) 0 (0)      Other 0       803     Total Intake(mL/kg) 1544 (21.4) 1263 (17.4)     Urine (mL/kg/hr) 900 (0.5) 550 (0.3) 250 (0.6)    Emesis/NG output  0 (0)     Other  0 (0)     Stool 0 (0) 0 (0)     Blood  0 (0)     Total Output 900 550 250    Net +644 +713 -250           Urine Occurrence 0 x 1 x     Stool Occurrence 0 x 0 x     Emesis Occurrence  0 x           Physical Exam   Constitutional: He is oriented to person, place, and time. He appears well-developed.   Hand  and temporal muscle wasting   HENT:   Head: Normocephalic.   Eyes: Pupils are equal, round, and reactive to light. No scleral icterus.   Cardiovascular: Normal rate, regular rhythm, normal heart sounds and intact distal pulses.    Pulmonary/Chest: Effort normal. No respiratory distress. He has decreased breath sounds in the right lower field and the left lower field. He has no wheezes. He has no rhonchi. He has no rales.   Abdominal: Soft. Bowel sounds are normal. He exhibits distension. There is tenderness in the right upper quadrant.   Musculoskeletal: Normal range of motion. He exhibits edema (generalized).   Neurological: He is alert and oriented to person, place, and time.   Skin: Skin is warm and dry.   Psychiatric: He has a normal mood and affect. His behavior is normal. Judgment and thought content normal.   Nursing note and vitals reviewed.      Laboratory:  Immunosuppressants         Stop Route Frequency     mycophenolate capsule 500 mg      -- Oral 2 times daily     cycloSPORINE modified capsule 125 mg      -- Oral 2 times daily     cycloSPORINE modified (NEORAL) 25 MG capsule      03/09 1714       cycloSPORINE modified (NEORAL) 100 MG capsule      03/09 1714       cycloSPORINE modified (NEORAL) 100 MG capsule      03/09 0514       cycloSPORINE modified (NEORAL) 25 MG capsule      03/09 0514          CBC:   Recent Labs  Lab 03/24/18  0430   WBC 5.65   RBC 2.58*   HGB 8.4*   HCT 26.4*   PLT 98*   *   MCH 32.6*   MCHC 31.8*     CMP:   Recent Labs  Lab 03/24/18  0430   GLU 93   CALCIUM 9.3   ALBUMIN 2.6*   PROT 6.0      K 4.1   CO2 23      *   CREATININE 4.3*   ALKPHOS 111   ALT 6*   AST 23   BILITOT 1.1*     Labs within the past 24 hours have been reviewed.    Diagnostic Results:  I have personally reviewed all pertinent imaging studies.    Assessment/Plan:     Delayed surgical wound healing    - Wd vac removed 1/12/18.    - Wound healing well.   - Changing dressing q Tuesday, last  3/20.        Liver transplanted    - Post op course complicated by fevers and hyperbilirubinemia, AST/ALT stable.  - see biliary stricture of transplanted liver  - Liver biopsy 1/23 without rejection.  - Paracentesis 3/6 negative for infection, wbc 28 segs 3%.  - ERCP, EUS biopsy 3/9/18 - no rejection, mild cholestatic hepatitis seen.         Long-term use of immunosuppressant medication    - Maintenance IS with cyclosporine --> transitioned to Prograf 2/24 for continued nausea but did not tolerate from neuro standpoint.  PT IS NOT A CANDIDATE FOR TACROLIMUS.    - Transitioned back to cyclosporine 3/1  - D/c cyclosporine, started rapa & stress dose steroids 3/10 for continued confusion  - Mental status now improved, re-trial cyclosporine 3/13, d/c rapa 3/13  - Transition back to prednisone taper from stress dose steroids 3/15 --> will decrease by 5 mg q week.  - Restarted low dose MMF 3/23. Pt tolerating well.        Prophylactic immunotherapy    - See long term use of immunosuppression.         At risk for opportunistic infections    - see other cytomegaloviral disease.  - Pentam given 2/24.  - D/C Isavu 3/21.        Seizure    - 3/1, patient with stuttering speech, trouble recalling specific words when speaking. Does have hx of seizure (on keppra) previously while on prograf. Switched to prograf over the weekend from cyclosporine. Neuro exam unremarkable.   - CT head without contrast obtained, unremarkable.   - In light of these symptoms, prograf d/c, restart cyclosporine 3/1.  - remains w/o seizure. Cont to have stutter/garbled speech/difficulty finding words.  Worse post EGD as sedation was given.  - MRI 3/6 unremarkable.  - EEG completed 3/8 - mild, generalized, non-specific cerebral dysfunction, no epileptiform activity  - monitor closely. VSS.        Anemia of chronic disease    - 1 U PRBC 3/12.  - Monitor daily labs. H/H stable, transfuse PRN.         Vitamin D deficiency    - Continue ergo 50K weekly.          Kidney transplant candidate              Other cytomegaloviral diseases    - detected at 370 on CMV PCR 2/1.  - case discussed with ID and will hold off on treatment at this time given low WBC.   - undetected CMV PCR 2/8, 2/15, 2/22, 3/8   - continue Valcyte 200 mg every Mon, Wed, Fri.  - Per ID, valcyte dose decreased to ppx dose on 2/18--Valcyte d/c'd 2/25  - Repeat scope 3/6 to assess persistent N/V and possible CMV causing these symptoms. Biopsies thus far normal, cmv stains negative.  - continue to monitor weekly CMV PCR's, last PCR neg from 3/15, recent level from 3/22 undetected.        Fever    - Started on vanc/cefepime for ERCP 1/11 then transitioned to vanc/zosyn on 1/12 due to fevers, then transitioned to ertapenem 2/22, ertapenem was d/c'd 3/7 due to AMS changes, following ERCP 3/9, he was treated with 3 week course of flagyl/cefepime, which he completed on 3/20.   - thoracentesis 1/11, 1/18, 1/31, and 2/16--all without infection, cultures NGTD  - multiple blood cultures sent over admission, all NGTD  - cmv positive 2/1--see below for CMV information  - taken to OR 2/7 for exp lap - fluid collections drained, cultures NGTD.  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  - IR attempted drainage on fluid collection on 2/27, however, per IR,there was limited fluid interposed with bowel in the RLQ with no safe pocket seen for drainage.  - Tagged WBC scan 2/27, negative results.  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 EBV PCR negative. Adenovirus negative. Repeat CMV PCR negative.    - Remains afebrile since 3/10 following ERCP 3/9 with metal stent placement.  - Bx with cholestatic hepatitis --> couldn't cx secondary to EUS contamination --> will cx next bx.  - Plan to continue abx (flagyl/cefepime) x 14 days to treat cholangitis --> d/c'd flagyl 3/19, d/c cefepime 3/20, and d/c isavu 3/21.  - Bandemia  noted on labs since 3/14. Continue to monitor.        Constipation    - chronic  - encourage ambulation, stool softeners and laxatives, often refuses some meds 2/2 nausea, will often require suppository or brown bomb enema to have a BM.  - Small bowel follow through with normal findings.        Severe protein-calorie malnutrition    - Poor PO intake since transplant requiring short course of TPN during previous hospital stay.   - Due to N/V and abdominal pain, multiple attempts were made to place SHANE tube with tube feeding, but patient did not tolerate TF  - Transitioned to TPN 1/16 then again on 2/24 due to poor tolerance of SHANE tube/TF  - prealbumin 7 on 1/15 --> prealbumin 22 on 3/19, monitoring weekly (Mondays)  - EGD performed 1/25 and 3/6, gastroparesis, duodenal ulcer, and congested/erythematous gastric mucosa  - Small bowel through with normal findings.  - Consulted GI for GJ tube placement, but was canceled due to ongoing fevers  - Hold Reglan and Marinol 3/8 for neuro symptoms --> restarted reglan and marinol 3/19, will need to dc marinol before discharge because not covered by insurance  - set goals for caloric intake with patient, will try nocturnal TPN started 3/20 PM to encourage appetite during the day  - Trying to set small caloric goals daily, for now goal is 300-500 jalen/day. Ultimately needs to consume 700 jalen/day to start weaning TPN.         Nausea and vomiting    - h/o constipation, abdominal pain, and N/V  - worsened with SHANE tube and TF --> now on TPN  - encourage bowel regimen  - EGD performed 1/25 and 3/6, gastroparesis, duodenal ulcer, and congested/erythematous gastric mucosa  - Small bowel through with normal findings  - Continue reglan and marinol scheduled, anti-emetics PRN.   - If cont with GI symptoms then may need to consider Flex sig/ C-scope to further eval.         Recurrent pleural effusion on right    - CXR in ER with large right pleural effusion with subjective c/o worsening  SOB.  - Multiple Thora's this admission, all non-infectious: 1/11, 1/19, 1/31, 2/16  - CT A/P 2/26 with large R pleural effusion, may need repeat thora if fevers reoccur.    - complaining of sob, obtain chest xray, trial dose of diamox 3/14 with minimal UOP  - chest xray with large pleural effusion 3/13 --> may need thora soon if becomes symptomatic or develops fever.  - Fluid removal with HD and/or diamox when HD held.        Biliary stricture of transplanted liver    - ERCP 12/6 with post-anastomosis stricture with stent placement.  - Tbili with increase prompting ERCP 1/30 with stones and sludge, stents placed.  - Repeat ERCP 2/19 as was having recurrent fevers with bilirubin stuck at 2.0-2.2 with overall unremarkable findings seen. Did place new larger stents.   - ERCP/EUS 3/9 with metal stent placed in bile duct, stones removed.  - Tbili remains stable, 1.1 today. Continue actigall.        Acute renal failure with tubular necrosis    - HD resumed on previous admission. Anuric on admission and dialyzes M-W-F.  - Nephrology following.    - Goal is for kidney txp in future- currently not a candidate due to health status.  - urine output has improved over last several days since 3/16, around 900ml uop daily.    - HD held 3/21 & 3/23, trialed lasix but patient refused lasix, prefers diamox  - 24 hour CrCl completed 3/23. Diamox that afternoon after collection completed.   - Nephrology ok with torsemide 50 mg daily. Starting 3/24.  - BP trending up this week likely 2/2 hypervolemia, should improve with diuresis, monitor.            VTE Risk Mitigation         Ordered     heparin (porcine) injection 1,000 Units  As needed (PRN)     Route:  Intra-Catheter        03/12/18 1134     heparin (porcine) injection 5,000 Units  Every 8 hours     Route:  Subcutaneous        03/08/18 1622     Medium Risk of VTE  Once      01/11/18 0351     Place sequential compression device  Until discontinued      01/11/18 0351          The  patients clinical status was discussed at multidisplinary rounds, involving transplant surgery, transplant medicine, pharmacy, nursing, nutrition, and social work    Discharge Planning: not a candidate for dc at this time.  Monitor kidney function --> may need outpt HD  Monitor HH needs vs rehab for deconditioned status      Mandy Crespo NP  Liver Transplant  [facility]

## 2018-03-24 NOTE — ASSESSMENT & PLAN NOTE
- Started on vanc/cefepime for ERCP 1/11 then transitioned to vanc/zosyn on 1/12 due to fevers, then transitioned to ertapenem 2/22, ertapenem was d/c'd 3/7 due to AMS changes, following ERCP 3/9, he was treated with 3 week course of flagyl/cefepime, which he completed on 3/20.   - thoracentesis 1/11, 1/18, 1/31, and 2/16--all without infection, cultures NGTD  - multiple blood cultures sent over admission, all NGTD  - cmv positive 2/1--see below for CMV information  - taken to OR 2/7 for exp lap - fluid collections drained, cultures NGTD.  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  - IR attempted drainage on fluid collection on 2/27, however, per IR,there was limited fluid interposed with bowel in the RLQ with no safe pocket seen for drainage.  - Tagged WBC scan 2/27, negative results.  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 EBV PCR negative. Adenovirus negative. Repeat CMV PCR negative.    - Remains afebrile since 3/10 following ERCP 3/9 with metal stent placement.  - Bx with cholestatic hepatitis --> couldn't cx secondary to EUS contamination --> will cx next bx.  - Plan to continue abx (flagyl/cefepime) x 14 days to treat cholangitis --> d/c'd flagyl 3/19, d/c cefepime 3/20, and d/c isavu 3/21.  - Bandemia noted on labs since 3/14. Continue to monitor.

## 2018-03-24 NOTE — PLAN OF CARE
Problem: Patient Care Overview  Goal: Plan of Care Review  Outcome: Ongoing (interventions implemented as appropriate)  Pt is AAOx4, stand by assist, and VSS. Managed pain with Oxycodone 5mg PO x1. Total u/o of 600ml and has not had a BM in three days. Pt is up in chair, call bell within reach, and nonskid socks on. Pt's mother at bedside.

## 2018-03-24 NOTE — ASSESSMENT & PLAN NOTE
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  - Tbili with increase prompting ERCP 1/30 with stones and sludge, stents placed.  - Repeat ERCP 2/19 as was having recurrent fevers with bilirubin stuck at 2.0-2.2 with overall unremarkable findings seen. Did place new larger stents.   - ERCP/EUS 3/9 with metal stent placed in bile duct, stones removed.  - Tbili remains stable, 1.1 today. Continue actigall.

## 2018-03-24 NOTE — PLAN OF CARE
Problem: Patient Care Overview  Goal: Plan of Care Review  Outcome: Ongoing (interventions implemented as appropriate)  Pt AAOx4, VSS, SpO- 96% on RA, afebrile  Nocturnal TPN remains at 80 ml/hr, lipids 20.8 ml/hr- plan to d/c in AM. Pt claims to have eaten half of turkey sandwich + dirty rice + pasta for dinner, nausea intermittent- gave PRN compazine due to zofran not on unit.   24 hour Creatinine Clearance collected at 2100 in evening, torsemide 50 mg IVPB infusing daily. I + O's collected  Walking hallway with mother, complains of chronic back pain relieved by 5 mg oxycodone q 4 h, No BM overnight.   Bed in lowest position, non skid socks worn, call light within reach. No acute events overnight, will continue to monitor.

## 2018-03-24 NOTE — SUBJECTIVE & OBJECTIVE
Scheduled Meds:   bisacodyl  10 mg Oral Daily    cycloSPORINE modified  125 mg Oral BID    docusate sodium  100 mg Oral BID    dronabinol  2.5 mg Oral QHS    epoetin maurisio (PROCRIT) injection  12,600 Units Intravenous Every Mon, Wed, Fri    ergocalciferol  50,000 Units Oral Q7 Days    fat emulsion 20%  250 mL Intravenous Once    heparin (porcine)  5,000 Units Subcutaneous Q8H    levetiracetam oral soln  500 mg Oral BID    levothyroxine  75 mcg Oral Before breakfast    metoclopramide HCl  10 mg Oral QID (AC & HS)    mycophenolate  500 mg Oral BID    omeprazole  40 mg Oral QAM    polyethylene glycol  17 g Oral BID    predniSONE  15 mg Oral Daily    psyllium husk (aspartame)  3.4 g Oral BID    torsemide  50 mg Oral Daily    ursodiol  300 mg Oral BID     Continuous Infusions:   TPN ADULT CENTRAL LINE CUSTOM       PRN Meds:sodium chloride 0.9%, acetaminophen, albuterol-ipratropium 2.5mg-0.5mg/3mL, bisacodyl, butalbital-acetaminophen-caffeine -40 mg, dextrose 50%, dextrose 50%, diphenhydrAMINE-zinc acetate 1-0.1%, glucagon (human recombinant), glucose, glucose, heparin (porcine), naloxone, ondansetron, ondansetron, oxyCODONE, oxyCODONE, prochlorperazine, simethicone, sodium chloride 0.9%, sodium chloride 0.9%    Review of Systems   Constitutional: Positive for activity change, appetite change and fatigue. Negative for chills and fever.   HENT: Negative for congestion and facial swelling.    Eyes: Negative for pain, discharge and visual disturbance.   Respiratory: Negative.  Negative for cough, chest tightness, shortness of breath and wheezing.    Cardiovascular: Negative.  Negative for chest pain, palpitations and leg swelling.   Gastrointestinal: Positive for abdominal distention, abdominal pain and constipation. Negative for diarrhea, nausea and vomiting.   Endocrine: Negative.    Genitourinary: Negative for decreased urine volume, difficulty urinating, dysuria, hematuria and urgency.    Musculoskeletal: Negative for back pain, gait problem, neck pain and neck stiffness.   Skin: Negative for color change and pallor.   Allergic/Immunologic: Positive for immunocompromised state.   Neurological: Negative for dizziness, seizures, weakness, light-headedness and headaches.   Psychiatric/Behavioral: Positive for dysphoric mood. Negative for behavioral problems, confusion, hallucinations, sleep disturbance and suicidal ideas. The patient is not nervous/anxious.      Objective:     Vital Signs (Most Recent):  Temp: 98.6 °F (37 °C) (03/24/18 1138)  Pulse: 104 (03/24/18 1138)  Resp: 18 (03/24/18 1138)  BP: (!) 143/85 (03/24/18 1138)  SpO2: 98 % (03/24/18 1138) Vital Signs (24h Range):  Temp:  [97.6 °F (36.4 °C)-98.7 °F (37.1 °C)] 98.6 °F (37 °C)  Pulse:  [] 104  Resp:  [15-18] 18  SpO2:  [94 %-98 %] 98 %  BP: (143-160)/(85-92) 143/85     Weight: 72.7 kg (160 lb 4.4 oz)  Body mass index is 25.88 kg/m².    Intake/Output - Last 3 Shifts       03/22 0700 - 03/23 0659 03/23 0700 - 03/24 0659 03/24 0700 - 03/25 0659    P.O. 640 460     I.V. (mL/kg) 0 (0)      Other 0       803     Total Intake(mL/kg) 1544 (21.4) 1263 (17.4)     Urine (mL/kg/hr) 900 (0.5) 550 (0.3) 250 (0.6)    Emesis/NG output  0 (0)     Other  0 (0)     Stool 0 (0) 0 (0)     Blood  0 (0)     Total Output 900 550 250    Net +644 +713 -250           Urine Occurrence 0 x 1 x     Stool Occurrence 0 x 0 x     Emesis Occurrence  0 x           Physical Exam   Constitutional: He is oriented to person, place, and time. He appears well-developed.   Hand and temporal muscle wasting   HENT:   Head: Normocephalic.   Eyes: Pupils are equal, round, and reactive to light. No scleral icterus.   Cardiovascular: Normal rate, regular rhythm, normal heart sounds and intact distal pulses.    Pulmonary/Chest: Effort normal. No respiratory distress. He has decreased breath sounds in the right lower field and the left lower field. He has no wheezes. He has  no rhonchi. He has no rales.   Abdominal: Soft. Bowel sounds are normal. He exhibits distension. There is tenderness in the right upper quadrant.   Musculoskeletal: Normal range of motion. He exhibits edema (generalized).   Neurological: He is alert and oriented to person, place, and time.   Skin: Skin is warm and dry.   Psychiatric: He has a normal mood and affect. His behavior is normal. Judgment and thought content normal.   Nursing note and vitals reviewed.      Laboratory:  Immunosuppressants         Stop Route Frequency     mycophenolate capsule 500 mg      -- Oral 2 times daily     cycloSPORINE modified capsule 125 mg      -- Oral 2 times daily     cycloSPORINE modified (NEORAL) 25 MG capsule      03/09 1714       cycloSPORINE modified (NEORAL) 100 MG capsule      03/09 1714       cycloSPORINE modified (NEORAL) 100 MG capsule      03/09 0514       cycloSPORINE modified (NEORAL) 25 MG capsule      03/09 0514          CBC:   Recent Labs  Lab 03/24/18  0430   WBC 5.65   RBC 2.58*   HGB 8.4*   HCT 26.4*   PLT 98*   *   MCH 32.6*   MCHC 31.8*     CMP:   Recent Labs  Lab 03/24/18  0430   GLU 93   CALCIUM 9.3   ALBUMIN 2.6*   PROT 6.0      K 4.1   CO2 23      *   CREATININE 4.3*   ALKPHOS 111   ALT 6*   AST 23   BILITOT 1.1*     Labs within the past 24 hours have been reviewed.    Diagnostic Results:  I have personally reviewed all pertinent imaging studies.

## 2018-03-24 NOTE — ASSESSMENT & PLAN NOTE
- HD resumed on previous admission. Anuric on admission and dialyzes M-W-F.  - Nephrology following.    - Goal is for kidney txp in future- currently not a candidate due to health status.  - urine output has improved over last several days since 3/16, around 900ml uop daily.    - HD held 3/21 & 3/23, trialed lasix but patient refused lasix, prefers diamox  - 24 hour CrCl completed 3/23. Diamox that afternoon after collection completed.   - Nephrology ok with torsemide 50 mg daily. Starting 3/24.  - BP trending up this week likely 2/2 hypervolemia, should improve with diuresis, monitor.

## 2018-03-24 NOTE — ASSESSMENT & PLAN NOTE
- detected at 370 on CMV PCR 2/1.  - case discussed with ID and will hold off on treatment at this time given low WBC.   - undetected CMV PCR 2/8, 2/15, 2/22, 3/8   - continue Valcyte 200 mg every Mon, Wed, Fri.  - Per ID, valcyte dose decreased to ppx dose on 2/18--Valcyte d/c'd 2/25  - Repeat scope 3/6 to assess persistent N/V and possible CMV causing these symptoms. Biopsies thus far normal, cmv stains negative.  - continue to monitor weekly CMV PCR's, last PCR neg from 3/15, recent level from 3/22 undetected.

## 2018-03-24 NOTE — SUBJECTIVE & OBJECTIVE
Interval History: No acute events overnight.  ml. Net pos 640 ml. Denies chest pain or shortness of breath. No change in standing weight 72kg from yesterday. Cr 4.1 from 3.7. Still requiring TPN. 24 hr CrCl in progress until 9 pm.     Review of patient's allergies indicates:   Allergen Reactions    Bactrim [sulfamethoxazole-trimethoprim] Other (See Comments)     Mookie Trell Syndrome       Objective:     Vital Signs (Most Recent):  Temp: 98.6 °F (37 °C) (03/23/18 2022)  Pulse: 86 (03/23/18 2022)  Resp: 17 (03/23/18 2022)  BP: (!) 146/92 (03/23/18 2022)  SpO2: 97 % (03/23/18 1610)  O2 Device (Oxygen Therapy): room air (03/23/18 1610) Vital Signs (24h Range):  Temp:  [98.3 °F (36.8 °C)-98.6 °F (37 °C)] 98.6 °F (37 °C)  Pulse:  [84-97] 86  Resp:  [17-20] 17  SpO2:  [95 %-98 %] 97 %  BP: (104-157)/(55-95) 146/92     Weight: 72 kg (158 lb 11.7 oz) (03/23/18 0400)  Body mass index is 25.63 kg/m².  Body surface area is 1.83 meters squared.    I/O last 3 completed shifts:  In: 1894 [P.O.:990]  Out: 1175 [Urine:1175]    Physical Exam   Constitutional: He is oriented to person, place, and time.   HENT:   Head: Atraumatic.   Mouth/Throat: No oropharyngeal exudate.   Mild facial edema   Eyes: No scleral icterus.   Cardiovascular: Normal rate and regular rhythm.    Pulmonary/Chest: Effort normal and breath sounds normal. No respiratory distress. He has no wheezes. He has no rales.   Abdominal: Soft. There is no tenderness. There is no rebound.   acites   Musculoskeletal: Edema: mild BLE edema.   Lymphadenopathy:     He has no cervical adenopathy.   Neurological: He is alert and oriented to person, place, and time.   Skin: He is not diaphoretic.       Significant Labs:  All labs within the past 24 hours have been reviewed.     Significant Imaging:  Labs: Reviewed  reviewed

## 2018-03-24 NOTE — PROGRESS NOTES
[facility]  Nephrology  Progress Note    Patient Name: Jhonny Diana  MRN: [id number]  Admission Date: 1/11/2018  Hospital Length of Stay: 71 days  Attending Provider: Nathanael Medina MD   Primary Care Physician: Primary Doctor No  Principal Problem:Fever    Subjective:     HPI: Jhonny Diana is a 28 y/o  male with PMHx relevant for ETOH cirrhosis.  S/p DDLT 10/19/2017; c/b seizures (swtiched off prograf to cyclo), ATN dialysis dependant in iHD MWF, last HD on 1/10, anuric, superficial wound infection s/p wound vac to chevron incision, and multiple admissions for fevers finally dx with peritonitis in November (WBC 5000, 75% PNM) neg for bile leak. HE gets recurrent paracentesis and has peritoneal drain. Other pertinent PMH current wound vac in place 2/2 wound infection, malnutrition requiring TPN for short course and ongoing hypoalbuminemia, and seizure activity while on prograf and has since been switched to cyclosporine without reoccurrence.  He is admitted to LTx service secondary to fever, abdominal pain, and N/V. He reports fever (103) for 1 day prior. Overnight, he developed N/V, reports small amount of green emesis with new left sided pain. He also endorses worsening SOB with exertion. He was scheduled as an outpatient for follow up paracentesis and IR drainage of fluid collection. CXR in ER shows large pleural effusion with subsegmental atelectasis. His ANC is 900. Broad spectrum antibiotics initiated in ED. Nephrology consulted for LAURA dialysis dependant co management. He dialyses at [facility] under the care of Dr. Peterson via [facility] rowe.      Interval History: No acute events overnight.  ml. Net pos 640 ml. Denies chest pain or shortness of breath. No change in standing weight 72kg from yesterday. Cr 4.1 from 3.7. Still requiring TPN. 24 hr CrCl in progress until 9 pm.     Review of patient's allergies indicates:   Allergen Reactions    Bactrim [sulfamethoxazole-trimethoprim]  Other (See Comments)     Mookie Trell Syndrome       Objective:     Vital Signs (Most Recent):  Temp: 98.6 °F (37 °C) (03/23/18 2022)  Pulse: 86 (03/23/18 2022)  Resp: 17 (03/23/18 2022)  BP: (!) 146/92 (03/23/18 2022)  SpO2: 97 % (03/23/18 1610)  O2 Device (Oxygen Therapy): room air (03/23/18 1610) Vital Signs (24h Range):  Temp:  [98.3 °F (36.8 °C)-98.6 °F (37 °C)] 98.6 °F (37 °C)  Pulse:  [84-97] 86  Resp:  [17-20] 17  SpO2:  [95 %-98 %] 97 %  BP: (104-157)/(55-95) 146/92     Weight: 72 kg (158 lb 11.7 oz) (03/23/18 0400)  Body mass index is 25.63 kg/m².  Body surface area is 1.83 meters squared.    I/O last 3 completed shifts:  In: 1894 [P.O.:990]  Out: 1175 [Urine:1175]    Physical Exam   Constitutional: He is oriented to person, place, and time.   HENT:   Head: Atraumatic.   Mouth/Throat: No oropharyngeal exudate.   Mild facial edema   Eyes: No scleral icterus.   Cardiovascular: Normal rate and regular rhythm.    Pulmonary/Chest: Effort normal and breath sounds normal. No respiratory distress. He has no wheezes. He has no rales.   Abdominal: Soft. There is no tenderness. There is no rebound.   acites   Musculoskeletal: Edema: mild BLE edema.   Lymphadenopathy:     He has no cervical adenopathy.   Neurological: He is alert and oriented to person, place, and time.   Skin: He is not diaphoretic.       Significant Labs:  All labs within the past 24 hours have been reviewed.     Significant Imaging:  Labs: Reviewed  reviewed    Assessment/Plan:     Acute renal failure with tubular necrosis    LAURA dialysisi dependant since 1016/2017  HD on [facility] under the care of Dr. Peterson  On HD for: 2.5 mo since 10/16/2017  Duration of outpatient dialysis session - 3.5 hrs  EDW - TBD  Residual Renal Function - yes   -Increase in UOP over weekend-Continue to avg UOP 90 0ml/24 hrs. Cr 2.5>3.3>3.7>4.1 last 4 days. Refused lasix but agrees to to torsemide.     -Hold HD today. Strict IO. Daily Weights. Last HD Mon 3/19.    -24 hr creatinine clearance in progress  -Okay to start torsemide 50 mg daily 3/24 to assist with volume management.      Anemia of CKD  -Increase Epogen by 25%. Please give even if not going to dialysis.                      Thank you for your consult. I will follow-up with patient. Please contact us if you have any additional questions.    Kay Guillen NP  Nephrology  [facility]

## 2018-03-25 LAB
ALBUMIN SERPL BCP-MCNC: 2.6 G/DL
ALP SERPL-CCNC: 120 U/L
ALT SERPL W/O P-5'-P-CCNC: 6 U/L
ANION GAP SERPL CALC-SCNC: 15 MMOL/L
ANISOCYTOSIS BLD QL SMEAR: SLIGHT
AST SERPL-CCNC: 22 U/L
BASOPHILS NFR BLD: 0 %
BILIRUB SERPL-MCNC: 1 MG/DL
BUN SERPL-MCNC: 114 MG/DL
CALCIUM SERPL-MCNC: 9.3 MG/DL
CHLORIDE SERPL-SCNC: 101 MMOL/L
CO2 SERPL-SCNC: 24 MMOL/L
CREAT SERPL-MCNC: 4.6 MG/DL
CYCLOSPORINE BLD LC/MS/MS-MCNC: 225 NG/ML
DIFFERENTIAL METHOD: ABNORMAL
EOSINOPHIL NFR BLD: 0 %
ERYTHROCYTE [DISTWIDTH] IN BLOOD BY AUTOMATED COUNT: 18.1 %
EST. GFR  (AFRICAN AMERICAN): 18.6 ML/MIN/1.73 M^2
EST. GFR  (NON AFRICAN AMERICAN): 16.1 ML/MIN/1.73 M^2
GLUCOSE SERPL-MCNC: 97 MG/DL
HCT VFR BLD AUTO: 26 %
HGB BLD-MCNC: 8.2 G/DL
HYPOCHROMIA BLD QL SMEAR: ABNORMAL
IMM GRANULOCYTES # BLD AUTO: ABNORMAL K/UL
IMM GRANULOCYTES NFR BLD AUTO: ABNORMAL %
LYMPHOCYTES NFR BLD: 14 %
MAGNESIUM SERPL-MCNC: 2.9 MG/DL
MAGNESIUM SERPL-MCNC: 2.9 MG/DL
MCH RBC QN AUTO: 32.3 PG
MCHC RBC AUTO-ENTMCNC: 31.5 G/DL
MCV RBC AUTO: 102 FL
MONOCYTES NFR BLD: 3 %
NEUTROPHILS NFR BLD: 78 %
NEUTS BAND NFR BLD MANUAL: 5 %
NRBC BLD-RTO: 0 /100 WBC
OVALOCYTES BLD QL SMEAR: ABNORMAL
PHOSPHATE SERPL-MCNC: 4.2 MG/DL
PHOSPHATE SERPL-MCNC: 5.1 MG/DL
PLATELET # BLD AUTO: 118 K/UL
PLATELET BLD QL SMEAR: ABNORMAL
PMV BLD AUTO: 11.8 FL
POIKILOCYTOSIS BLD QL SMEAR: SLIGHT
POLYCHROMASIA BLD QL SMEAR: ABNORMAL
POTASSIUM SERPL-SCNC: 4.5 MMOL/L
PROT SERPL-MCNC: 6 G/DL
RBC # BLD AUTO: 2.54 M/UL
SODIUM SERPL-SCNC: 140 MMOL/L
WBC # BLD AUTO: 6 K/UL

## 2018-03-25 PROCEDURE — A4217 STERILE WATER/SALINE, 500 ML: HCPCS | Performed by: NURSE PRACTITIONER

## 2018-03-25 PROCEDURE — 83735 ASSAY OF MAGNESIUM: CPT

## 2018-03-25 PROCEDURE — 80053 COMPREHEN METABOLIC PANEL: CPT

## 2018-03-25 PROCEDURE — 25000003 PHARM REV CODE 250: Performed by: PHYSICIAN ASSISTANT

## 2018-03-25 PROCEDURE — 63600175 PHARM REV CODE 636 W HCPCS: Performed by: NURSE PRACTITIONER

## 2018-03-25 PROCEDURE — 83735 ASSAY OF MAGNESIUM: CPT | Mod: 91

## 2018-03-25 PROCEDURE — 25000003 PHARM REV CODE 250: Performed by: NURSE PRACTITIONER

## 2018-03-25 PROCEDURE — 20600001 HC STEP DOWN PRIVATE ROOM

## 2018-03-25 PROCEDURE — 80158 DRUG ASSAY CYCLOSPORINE: CPT

## 2018-03-25 PROCEDURE — 94664 DEMO&/EVAL PT USE INHALER: CPT

## 2018-03-25 PROCEDURE — 84100 ASSAY OF PHOSPHORUS: CPT

## 2018-03-25 PROCEDURE — 99233 SBSQ HOSP IP/OBS HIGH 50: CPT | Mod: ,,, | Performed by: NURSE PRACTITIONER

## 2018-03-25 PROCEDURE — 80053 COMPREHEN METABOLIC PANEL: CPT | Mod: 91

## 2018-03-25 PROCEDURE — 84100 ASSAY OF PHOSPHORUS: CPT | Mod: 91

## 2018-03-25 PROCEDURE — B4185 PARENTERAL SOL 10 GM LIPIDS: HCPCS | Performed by: NURSE PRACTITIONER

## 2018-03-25 RX ORDER — BISACODYL 10 MG
10 SUPPOSITORY, RECTAL RECTAL DAILY PRN
Status: DISCONTINUED | OUTPATIENT
Start: 2018-03-25 | End: 2018-04-10 | Stop reason: HOSPADM

## 2018-03-25 RX ADMIN — OXYCODONE HYDROCHLORIDE 5 MG: 5 TABLET ORAL at 12:03

## 2018-03-25 RX ADMIN — LEVOTHYROXINE SODIUM 75 MCG: 75 TABLET ORAL at 05:03

## 2018-03-25 RX ADMIN — MAGNESIUM SULFATE HEPTAHYDRATE: 500 INJECTION, SOLUTION INTRAMUSCULAR; INTRAVENOUS at 11:03

## 2018-03-25 RX ADMIN — URSODIOL 300 MG: 300 CAPSULE ORAL at 08:03

## 2018-03-25 RX ADMIN — DOCUSATE SODIUM 100 MG: 100 CAPSULE, LIQUID FILLED ORAL at 08:03

## 2018-03-25 RX ADMIN — CYCLOSPORINE 125 MG: 100 CAPSULE, LIQUID FILLED ORAL at 08:03

## 2018-03-25 RX ADMIN — BISACODYL 10 MG: 5 TABLET, COATED ORAL at 08:03

## 2018-03-25 RX ADMIN — MYCOPHENOLATE MOFETIL 500 MG: 250 CAPSULE ORAL at 08:03

## 2018-03-25 RX ADMIN — OXYCODONE HYDROCHLORIDE 5 MG: 5 TABLET ORAL at 08:03

## 2018-03-25 RX ADMIN — METOCLOPRAMIDE HYDROCHLORIDE 10 MG: 5 SOLUTION ORAL at 11:03

## 2018-03-25 RX ADMIN — PREDNISONE 15 MG: 5 TABLET ORAL at 08:03

## 2018-03-25 RX ADMIN — CYCLOSPORINE 125 MG: 100 CAPSULE, LIQUID FILLED ORAL at 05:03

## 2018-03-25 RX ADMIN — PROCHLORPERAZINE EDISYLATE 10 MG: 5 INJECTION INTRAMUSCULAR; INTRAVENOUS at 10:03

## 2018-03-25 RX ADMIN — SOYBEAN OIL 250 ML: 20 INJECTION, SOLUTION INTRAVENOUS at 11:03

## 2018-03-25 RX ADMIN — METOCLOPRAMIDE HYDROCHLORIDE 10 MG: 5 SOLUTION ORAL at 05:03

## 2018-03-25 RX ADMIN — LEVETIRACETAM 500 MG: 100 SOLUTION ORAL at 08:03

## 2018-03-25 RX ADMIN — TORSEMIDE 50 MG: 20 TABLET ORAL at 08:03

## 2018-03-25 RX ADMIN — OXYCODONE HYDROCHLORIDE 5 MG: 5 TABLET ORAL at 05:03

## 2018-03-25 RX ADMIN — METOCLOPRAMIDE HYDROCHLORIDE 10 MG: 5 SOLUTION ORAL at 08:03

## 2018-03-25 RX ADMIN — OXYCODONE HYDROCHLORIDE 5 MG: 5 TABLET ORAL at 09:03

## 2018-03-25 RX ADMIN — OXYCODONE HYDROCHLORIDE 5 MG: 5 TABLET ORAL at 02:03

## 2018-03-25 RX ADMIN — DRONABINOL 2.5 MG: 2.5 CAPSULE ORAL at 08:03

## 2018-03-25 NOTE — ASSESSMENT & PLAN NOTE
- Started on vanc/cefepime for ERCP 1/11 then transitioned to vanc/zosyn on 1/12 due to fevers, then transitioned to ertapenem 2/22, ertapenem was d/c'd 3/7 due to AMS changes, following ERCP 3/9, he was treated with 3 week course of flagyl/cefepime, which he completed on 3/20.   - thoracentesis 1/11, 1/18, 1/31, and 2/16--all without infection, cultures NGTD  - multiple blood cultures sent over admission, all NGTD  - cmv positive 2/1--see below for CMV information  - taken to OR 2/7 for exp lap - fluid collections drained, cultures NGTD.  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  - IR attempted drainage on fluid collection on 2/27, however, per IR,there was limited fluid interposed with bowel in the RLQ with no safe pocket seen for drainage.  - Tagged WBC scan 2/27, negative results.  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 EBV PCR negative. Adenovirus negative. Repeat CMV PCR negative.    - Remains afebrile since 3/10 following ERCP 3/9 with metal stent placement.  - Bx with cholestatic hepatitis --> couldn't cx secondary to EUS contamination --> will cx next bx.  - Plan to continue abx (flagyl/cefepime) x 14 days to treat cholangitis --> d/c'd flagyl 3/19, d/c cefepime 3/20, and d/c isavu 3/21.  - Bandemia noted on labs since 3/14.  - pt remains afebrile off all antibiotics.

## 2018-03-25 NOTE — PROGRESS NOTES
[facility]  Liver Transplant  Progress Note    Patient Name: Jhonny Diana  MRN: [id number]  Admission Date: 2018  Hospital Length of Stay: 73 days  Code Status: Full Code  Primary Care Provider: Primary Doctor No  Post-Operative Day: 157    ORGAN:   LIVER  Disease Etiology: Acute Alcoholic Hepatitis  Donor Type:    - Brain Death  CDC High Risk:   No  Donor CMV Status:   Donor CMV Status: Positive  Donor HBcAB:   Negative  Donor HCV Status:   Negative  Whole or Partial: Whole Liver  Biliary Anastomosis: End to End  Arterial Anatomy: Standard  Subjective:     History of Present Illness:  Jhonny Diana is a 28 y/o male with past medical history of alcoholic cirrhosis.  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 Found to have peritonitis in November (WBC 5000, 75% PNM) neg for bile leak. He was treated initially with vanc/cefepime. Repeat cell counts  with some improvement (WBC 1400, 45% PNM). He has undergone multiple paracenteses as well as abscess drainage of perihepatic fluid collections and treated with antimicrobial therapy but no positive cultures. Of note, biliary stricture also identified and ERCP performed on 2017 with sphincterotomy and biliary stent placed. Liver tests still have not normalized despite intervention, bilirubin and AP remain elevated. Other pertinent PMH current wound vac in place 2/2 wound infection, malnutrition requiring TPN for short course and ongoing hypoalbuminemia, and seizure activity while on prograf and has since been switched to cyclosporine without reoccurrence.  He presented to the ER for fever, abdominal pain, and N/V. He reports fever (103) for 1  day prior. Overnight, he developed N/V, reports small amount of green emesis with new left sided pain. He also endorses worsening SOB with exertion. He was scheduled as an outpatient for follow up paracentesis and IR drainage of fluid collection. CXR in ER shows large pleural effusion with subsegmental atelectasis. Infectious work up initiated in ER. His ANC is 900. Broad spectrum antibiotics initiated in ED. At admit, he denied chest pain, palpitations, diarrhea, constipation, back pain, or any sick contacts.    Hospital Course:  Unclear cause of ongoing fevers. Multiple infectious, diagnostic testing completed. Taken to OR for ex lap 2/6, loculated fluid collections found, cultures NGTD. Multiple blood cultures with NGTD. Thoracentesis 1/11, 1/18, 1/31, and 2/16, cultures NGTD. Paracentesis 1/11, 1/19, and 2/5, cultures NGTD. negative fungal markers/quant gold intermediate. Negative TTE.  IR drainage 1/11, cultures NGTD. Continued with intermittent fevers despite broad spectrum antibiotics that were initially started 1/11, then broadened antibiotics 2/22 from vanc/zosyn to ertapenem. Multiple CT scan of abd obtained that were unremarkable. Tagged WBCs scan 2/27 without source of infection found. He had several episodes of neutropenia requiring neupogen, last given 2/28. 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 Monitoring weekly CMV PCR. Chimerism studies obtained 3/1 to assess for GVHD, which was negative, EBV PCR negative, adenovirus negative. In addition to fevers, he has consistently c/o abdominal pain and N/V. Tbili has remain elevated post-op requiring multiple ERCPs. ERCPs performed this admission on 1/30, 2/19, and 3/9. ERCPs with sludge and stones with stents placed. Last ERCP 3/9 with metal stent placed into CBD and stones removed. His ertapenem was d/cd 3/7 due to AMS. Following ERCP 3/9, he was started on cefepime and flagyl  for possible cholangitis. He has remained afebrile since 3/10. He completed cefepime/flagyl after 3 weeks on 3/19 & 3/20. Due to elevated enzymes, he had a liver biopsy 1/23 consistent with cholestatic hepatitis (no cultures obtained during biopsy). GI was consulted for persistent N/V and malnutrition. EGD performed 1/25 and 3/6, EGD 3/6 with normal esophagus congested, erythematous and nodular mucosa in the stomach, one duodenal ulcer with a clean ulcer base (Emir Class III), biopsies taken, negative for CMV and GVHD. Due to N/V and abdominal pain, multiple attempts were made to place SHANE tube with tube feeding, but patient did not tolerate TF. Therefore, he was transitioned to TPN 1/16 then again on 2/24. He also has chronic constipation. Small bowel follow through study with normal findings. Following EGD 1/25, he was started on a PPI, miralax, and Metamucil, but he did not tolerate meds. For appetite stimulation he was trialed on marinol. He also did well with reglan before meals and at bedtime. Early post-op liver transplant surgery, he was switched to cyclosporine for seizures. During this admission, he was transitioned back to prograf 2/24 with keppra on board. Starting 3/1 he developed stuttering speech, disorientation. CT head 3/1 and MRI head 3/6 unremarkable. EEG 3/8 with no seizure activity. He started on rapamune and stress dose steroids 3/10, then ultimately returned to cyclosporine 3/13 and rapamune d/cd same day. Since transitioning back to cyclosporine, his mental status has remained stable. During admission, patient was evaluated by [facility] for future kidney transplant, and he was deemed to be a suitable candidate once medically stable and cleared. He dialyzes on a M-W-F schedule. He was anuric on admission, but starting producing urine around 3/16 and continues to produce around 800-900cc daily.     Interval History: no acute events overnight. Slowly trying to improve caloric intake, goal 300-500  jalen/day, goal is to wean TPN once 700 jalen/day. Continue nocturnal TPN/Lipids for now. Continue reglan and marinol. Last HD Monday 3/19 (MWF scheduled, held WF). Cr 4.6 from 4.3. UOP 1730 ml over the past 24 hr. Diamox given 3/23. Nephrology following. Torsemide 50 mg daily started yesterday. He remains afebrile off antibiotics. No issues with restarted cellcept 500 mg bid. Monitor.     Scheduled Meds:   bisacodyl  10 mg Oral Daily    cycloSPORINE modified  125 mg Oral BID    docusate sodium  100 mg Oral BID    dronabinol  2.5 mg Oral QHS    epoetin maurisio (PROCRIT) injection  12,600 Units Intravenous Every Mon, Wed, Fri    ergocalciferol  50,000 Units Oral Q7 Days    fat emulsion 20%  250 mL Intravenous Once    heparin (porcine)  5,000 Units Subcutaneous Q8H    levetiracetam oral soln  500 mg Oral BID    levothyroxine  75 mcg Oral Before breakfast    metoclopramide HCl  10 mg Oral QID (AC & HS)    mycophenolate  500 mg Oral BID    omeprazole  40 mg Oral QAM    polyethylene glycol  17 g Oral BID    predniSONE  15 mg Oral Daily    psyllium husk (aspartame)  3.4 g Oral BID    torsemide  50 mg Oral Daily    ursodiol  300 mg Oral BID     Continuous Infusions:   TPN ADULT CENTRAL LINE CUSTOM       PRN Meds:sodium chloride 0.9%, acetaminophen, albuterol-ipratropium 2.5mg-0.5mg/3mL, bisacodyl, butalbital-acetaminophen-caffeine -40 mg, dextrose 50%, dextrose 50%, diphenhydrAMINE-zinc acetate 1-0.1%, glucagon (human recombinant), glucose, glucose, heparin (porcine), naloxone, ondansetron, ondansetron, oxyCODONE, oxyCODONE, prochlorperazine, simethicone, sodium chloride 0.9%, sodium chloride 0.9%    Review of Systems   Constitutional: Positive for activity change, appetite change and fatigue. Negative for chills and fever.   HENT: Negative for congestion and facial swelling.    Eyes: Negative for pain, discharge and visual disturbance.   Respiratory: Negative.  Negative for cough, chest tightness,  shortness of breath and wheezing.    Cardiovascular: Negative.  Negative for chest pain, palpitations and leg swelling.   Gastrointestinal: Positive for abdominal distention, abdominal pain and constipation. Negative for diarrhea, nausea and vomiting.   Endocrine: Negative.    Genitourinary: Negative for decreased urine volume, difficulty urinating, dysuria, hematuria and urgency.   Musculoskeletal: Negative for back pain, gait problem, neck pain and neck stiffness.   Skin: Negative for color change and pallor.   Allergic/Immunologic: Positive for immunocompromised state.   Neurological: Negative for dizziness, seizures, weakness, light-headedness and headaches.   Psychiatric/Behavioral: Positive for dysphoric mood. Negative for behavioral problems, confusion, hallucinations, sleep disturbance and suicidal ideas. The patient is not nervous/anxious.      Objective:     Vital Signs (Most Recent):  Temp: 98.6 °F (37 °C) (03/25/18 1224)  Pulse: 105 (03/25/18 1224)  Resp: 18 (03/25/18 1224)  BP: 138/78 (03/25/18 1224)  SpO2: 97 % (03/25/18 1224) Vital Signs (24h Range):  Temp:  [98.2 °F (36.8 °C)-98.8 °F (37.1 °C)] 98.6 °F (37 °C)  Pulse:  [] 105  Resp:  [16-18] 18  SpO2:  [95 %-97 %] 97 %  BP: (130-159)/(78-91) 138/78     Weight: 71.9 kg (158 lb 8.2 oz)  Body mass index is 25.6 kg/m².    Intake/Output - Last 3 Shifts       03/23 0700 - 03/24 0659 03/24 0700 - 03/25 0659 03/25 0700 - 03/26 0659    P.O. 460 670     I.V. (mL/kg)  60 (0.8)     Other        731.3     Total Intake(mL/kg) 1263 (17.4) 1461.3 (20.3)     Urine (mL/kg/hr) 550 (0.3) 1735 (1) 475 (1.2)    Emesis/NG output 0 (0) 0 (0)     Other 0 (0) 0 (0)     Stool 0 (0) 0 (0) 0 (0)    Blood 0 (0) 0 (0)     Total Output 550 1735 475    Net +713 -273.7 -475           Urine Occurrence 1 x 0 x     Stool Occurrence 0 x 0 x 1 x    Emesis Occurrence 0 x 0 x           Physical Exam   Constitutional: He is oriented to person, place, and time. He appears  well-developed.   Hand and temporal muscle wasting   HENT:   Head: Normocephalic.   Eyes: Pupils are equal, round, and reactive to light. No scleral icterus.   Cardiovascular: Normal rate, regular rhythm, normal heart sounds and intact distal pulses.    Pulmonary/Chest: Effort normal. No respiratory distress. He has decreased breath sounds in the right lower field and the left lower field. He has no wheezes. He has no rhonchi. He has no rales.   Abdominal: Soft. Bowel sounds are normal. He exhibits distension. There is tenderness in the right upper quadrant.   Musculoskeletal: Normal range of motion. He exhibits edema (generalized).   Neurological: He is alert and oriented to person, place, and time.   Skin: Skin is warm and dry.   Psychiatric: He has a normal mood and affect. His behavior is normal. Judgment and thought content normal.   Nursing note and vitals reviewed.      Laboratory:  Immunosuppressants         Stop Route Frequency     mycophenolate capsule 500 mg      -- Oral 2 times daily     cycloSPORINE modified capsule 125 mg      -- Oral 2 times daily     cycloSPORINE modified (NEORAL) 25 MG capsule      03/09 1714       cycloSPORINE modified (NEORAL) 100 MG capsule      03/09 1714       cycloSPORINE modified (NEORAL) 100 MG capsule      03/09 0514       cycloSPORINE modified (NEORAL) 25 MG capsule      03/09 0514          CBC:   Recent Labs  Lab 03/25/18  0530   WBC 6.00   RBC 2.54*   HGB 8.2*   HCT 26.0*   *   *   MCH 32.3*   MCHC 31.5*     CMP:   Recent Labs  Lab 03/25/18  0530   GLU 97   CALCIUM 9.3   ALBUMIN 2.6*   PROT 6.0      K 4.5   CO2 24      *   CREATININE 4.6*   ALKPHOS 120   ALT 6*   AST 22   BILITOT 1.0     Labs within the past 24 hours have been reviewed.    Diagnostic Results:  I have personally reviewed all pertinent imaging studies.    Assessment/Plan:     Delayed surgical wound healing    - Wd vac removed 1/12/18.    - Wound healing well.   - Changing  dressing q Tuesday, last 3/20.        Liver transplanted    - Post op course complicated by fevers and hyperbilirubinemia, AST/ALT stable.  - see biliary stricture of transplanted liver  - Liver biopsy 1/23 without rejection.  - Paracentesis 3/6 negative for infection, wbc 28 segs 3%.  - ERCP, EUS biopsy 3/9/18 - no rejection, mild cholestatic hepatitis seen.   - T bili improved to 1 today.        Long-term use of immunosuppressant medication    - Maintenance IS with cyclosporine --> transitioned to Prograf 2/24 for continued nausea but did not tolerate from neuro standpoint.  PT IS NOT A CANDIDATE FOR TACROLIMUS.    - Transitioned back to cyclosporine 3/1  - D/c cyclosporine, started rapa & stress dose steroids 3/10 for continued confusion  - Mental status now improved, re-trial cyclosporine 3/13, d/c rapa 3/13  - Transition back to prednisone taper from stress dose steroids 3/15 --> will decrease by 5 mg q week.  - Restarted low dose MMF 3/23. Pt tolerating well.        Prophylactic immunotherapy    - See long term use of immunosuppression.         At risk for opportunistic infections    - see other cytomegaloviral disease.  - Pentam given 2/24.  - D/C Isavu 3/21.        Seizure    - 3/1, patient with stuttering speech, trouble recalling specific words when speaking. Does have hx of seizure (on keppra) previously while on prograf. Switched to prograf over the weekend from cyclosporine. Neuro exam unremarkable.   - CT head without contrast obtained, unremarkable.   - In light of these symptoms, prograf d/c, restart cyclosporine 3/1.  - remains w/o seizure. Cont to have stutter/garbled speech/difficulty finding words.  Worse post EGD as sedation was given.  - MRI 3/6 unremarkable.  - EEG completed 3/8 - mild, generalized, non-specific cerebral dysfunction, no epileptiform activity.  - monitor closely. VSS.        Anemia of chronic disease    - 1 U PRBC 3/12.  - Monitor daily labs. H/H stable, transfuse PRN.          Vitamin D deficiency    - Continue ergo 50K weekly.         Kidney transplant candidate              Other cytomegaloviral diseases    - detected at 370 on CMV PCR 2/1.  - case discussed with ID and will hold off on treatment at this time given low WBC.   - undetected CMV PCR 2/8, 2/15, 2/22, 3/8   - continue Valcyte 200 mg every Mon, Wed, Fri.  - Per ID, valcyte dose decreased to ppx dose on 2/18--Valcyte d/c'd 2/25  - Repeat scope 3/6 to assess persistent N/V and possible CMV causing these symptoms. Biopsies thus far normal, cmv stains negative.  - continue to monitor weekly CMV PCR's, last PCR neg from 3/15, recent level from 3/22 undetected.        Fever    - Started on vanc/cefepime for ERCP 1/11 then transitioned to vanc/zosyn on 1/12 due to fevers, then transitioned to ertapenem 2/22, ertapenem was d/c'd 3/7 due to AMS changes, following ERCP 3/9, he was treated with 3 week course of flagyl/cefepime, which he completed on 3/20.   - thoracentesis 1/11, 1/18, 1/31, and 2/16--all without infection, cultures NGTD  - multiple blood cultures sent over admission, all NGTD  - cmv positive 2/1--see below for CMV information  - taken to OR 2/7 for exp lap - fluid collections drained, cultures NGTD.  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  - IR attempted drainage on fluid collection on 2/27, however, per IR,there was limited fluid interposed with bowel in the RLQ with no safe pocket seen for drainage.  - Tagged WBC scan 2/27, negative results.  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 EBV PCR negative. Adenovirus negative. Repeat CMV PCR negative.    - Remains afebrile since 3/10 following ERCP 3/9 with metal stent placement.  - Bx with cholestatic hepatitis --> couldn't cx secondary to EUS contamination --> will cx next bx.  - Plan to continue abx (flagyl/cefepime) x 14 days to treat cholangitis --> d/c'd flagyl  3/19, d/c cefepime 3/20, and d/c isavu 3/21.  - Bandemia noted on labs since 3/14.  - pt remains afebrile off all antibiotics.        Constipation    - chronic  - encourage ambulation, stool softeners and laxatives, often refuses some meds 2/2 nausea, will often require suppository or brown bomb enema to have a BM.  - Small bowel follow through with normal findings.        Severe protein-calorie malnutrition    - Poor PO intake since transplant requiring short course of TPN during previous hospital stay.   - Due to N/V and abdominal pain, multiple attempts were made to place SHANE tube with tube feeding, but patient did not tolerate TF  - Transitioned to TPN 1/16 then again on 2/24 due to poor tolerance of SHANE tube/TF  - prealbumin 7 on 1/15 --> prealbumin 22 on 3/19, monitoring weekly (Mondays)  - EGD performed 1/25 and 3/6, gastroparesis, duodenal ulcer, and congested/erythematous gastric mucosa  - Small bowel through with normal findings.  - Consulted GI for GJ tube placement, but was canceled due to ongoing fevers  - Hold Reglan and Marinol 3/8 for neuro symptoms --> restarted reglan and marinol 3/19, will need to dc marinol before discharge because not covered by insurance.  - set goals for caloric intake with patient, will try nocturnal TPN started 3/20 PM to encourage appetite during the day  - Trying to set small caloric goals daily, for now goal is 300-500 jalen/day. Ultimately needs to consume 700 jalen/day to start weaning TPN.         Nausea and vomiting    - h/o constipation, abdominal pain, and N/V  - worsened with SHANE tube and TF --> now on TPN  - encourage bowel regimen  - EGD performed 1/25 and 3/6, gastroparesis, duodenal ulcer, and congested/erythematous gastric mucosa  - Small bowel through with normal findings  - Continue reglan and marinol scheduled, anti-emetics PRN.   - If cont with GI symptoms then may need to consider Flex sig/ C-scope to further eval.         Recurrent pleural effusion on right     - CXR in ER with large right pleural effusion with subjective c/o worsening SOB.  - Multiple Thora's this admission, all non-infectious: 1/11, 1/19, 1/31, 2/16  - CT A/P 2/26 with large R pleural effusion, may need repeat thora if fevers reoccur.    - complaining of sob, obtain chest xray, trial dose of diamox 3/14 with minimal UOP  - chest xray with large pleural effusion 3/13 --> may need thora soon if becomes symptomatic or develops fever.  - Fluid removal with HD and/or diamox when HD held.        Biliary stricture of transplanted liver    - ERCP 12/6 with post-anastomosis stricture with stent placement.  - Tbili with increase prompting ERCP 1/30 with stones and sludge, stents placed.  - Repeat ERCP 2/19 as was having recurrent fevers with bilirubin stuck at 2.0-2.2 with overall unremarkable findings seen. Did place new larger stents.   - ERCP/EUS 3/9 with metal stent placed in bile duct, stones removed.  - Tbili remains stable, 1 today. Continue actigall.        Acute renal failure with tubular necrosis    - HD resumed on previous admission. Anuric on admission and dialyzes M-W-F.  - Nephrology following.    - Goal is for kidney txp in future- currently not a candidate due to health status.  - urine output has improved over last several days since 3/16, around 900ml uop daily.    - HD held 3/21 & 3/23, trialed lasix but patient refused lasix, prefers diamox  - 24 hour CrCl completed 3/23. Diamox that afternoon after collection completed.   - Nephrology ok with torsemide 50 mg daily. Starting 3/24.  - BP trending up this week likely 2/2 hypervolemia, should improve with diuresis, monitor.            VTE Risk Mitigation         Ordered     heparin (porcine) injection 1,000 Units  As needed (PRN)     Route:  Intra-Catheter        03/12/18 1134     heparin (porcine) injection 5,000 Units  Every 8 hours     Route:  Subcutaneous        03/08/18 1622     Medium Risk of VTE  Once      01/11/18 0351     Place  sequential compression device  Until discontinued      01/11/18 0351          The patients clinical status was discussed at multidisplinary rounds, involving transplant surgery, transplant medicine, pharmacy, nursing, nutrition, and social work    Discharge Planning: not a candidate for dc at this time.  Monitor kidney function --> may need outpt HD  Monitor HH needs vs rehab for deconditioned status      Mandy Crespo NP  Liver Transplant  [facility]

## 2018-03-25 NOTE — ASSESSMENT & PLAN NOTE
- Post op course complicated by fevers and hyperbilirubinemia, AST/ALT stable.  - see biliary stricture of transplanted liver  - Liver biopsy 1/23 without rejection.  - Paracentesis 3/6 negative for infection, wbc 28 segs 3%.  - ERCP, EUS biopsy 3/9/18 - no rejection, mild cholestatic hepatitis seen.   - T bili improved to 1 today.

## 2018-03-25 NOTE — PLAN OF CARE
Problem: Patient Care Overview  Goal: Plan of Care Review  Outcome: Ongoing (interventions implemented as appropriate)  Pt is AAOx4, 1 person assist, and VSS. Pt c/o of abd pain- managed with oxycodone 5mg PO x 3. Managed pt's nausea with Compazine-IV Zofran was not available at time of need and pt refused PO Zofran. Pt had 2 BMs. Pt refused heparin. Pt ambulated in hallway. Pt is up in chair, call bell within reach, and nonskid socks on. Pt's mother at bedside.

## 2018-03-25 NOTE — PLAN OF CARE
Problem: Patient Care Overview  Goal: Plan of Care Review  Outcome: Ongoing (interventions implemented as appropriate)  Pt AAOx4, VSS, afebrile.  C/o abdominal pain with relief to prn oxy IR 5mg.  TPN infusing 80cc/hr Lipids infusing 20.8cc/hr.  Pt mother remain at bedside.  Pt ambulated off unit with mother during shift.  275cc urine measured per mother 0 BM.  Fall risk precautions initiated.  Pt in lowest bed position setting, lighting adjusted, pt to wear nonskid socks when ambulating, side rails up x2.  Pt remain free from falls during shift.  Pt verbalize understanding to call when needed assistance.  Call light within reach.  Will continue to monitor.

## 2018-03-25 NOTE — SUBJECTIVE & OBJECTIVE
Scheduled Meds:   bisacodyl  10 mg Oral Daily    cycloSPORINE modified  125 mg Oral BID    docusate sodium  100 mg Oral BID    dronabinol  2.5 mg Oral QHS    epoetin maurisio (PROCRIT) injection  12,600 Units Intravenous Every Mon, Wed, Fri    ergocalciferol  50,000 Units Oral Q7 Days    fat emulsion 20%  250 mL Intravenous Once    heparin (porcine)  5,000 Units Subcutaneous Q8H    levetiracetam oral soln  500 mg Oral BID    levothyroxine  75 mcg Oral Before breakfast    metoclopramide HCl  10 mg Oral QID (AC & HS)    mycophenolate  500 mg Oral BID    omeprazole  40 mg Oral QAM    polyethylene glycol  17 g Oral BID    predniSONE  15 mg Oral Daily    psyllium husk (aspartame)  3.4 g Oral BID    torsemide  50 mg Oral Daily    ursodiol  300 mg Oral BID     Continuous Infusions:   TPN ADULT CENTRAL LINE CUSTOM       PRN Meds:sodium chloride 0.9%, acetaminophen, albuterol-ipratropium 2.5mg-0.5mg/3mL, bisacodyl, butalbital-acetaminophen-caffeine -40 mg, dextrose 50%, dextrose 50%, diphenhydrAMINE-zinc acetate 1-0.1%, glucagon (human recombinant), glucose, glucose, heparin (porcine), naloxone, ondansetron, ondansetron, oxyCODONE, oxyCODONE, prochlorperazine, simethicone, sodium chloride 0.9%, sodium chloride 0.9%    Review of Systems   Constitutional: Positive for activity change, appetite change and fatigue. Negative for chills and fever.   HENT: Negative for congestion and facial swelling.    Eyes: Negative for pain, discharge and visual disturbance.   Respiratory: Negative.  Negative for cough, chest tightness, shortness of breath and wheezing.    Cardiovascular: Negative.  Negative for chest pain, palpitations and leg swelling.   Gastrointestinal: Positive for abdominal distention, abdominal pain and constipation. Negative for diarrhea, nausea and vomiting.   Endocrine: Negative.    Genitourinary: Negative for decreased urine volume, difficulty urinating, dysuria, hematuria and urgency.    Musculoskeletal: Negative for back pain, gait problem, neck pain and neck stiffness.   Skin: Negative for color change and pallor.   Allergic/Immunologic: Positive for immunocompromised state.   Neurological: Negative for dizziness, seizures, weakness, light-headedness and headaches.   Psychiatric/Behavioral: Positive for dysphoric mood. Negative for behavioral problems, confusion, hallucinations, sleep disturbance and suicidal ideas. The patient is not nervous/anxious.      Objective:     Vital Signs (Most Recent):  Temp: 98.6 °F (37 °C) (03/25/18 1224)  Pulse: 105 (03/25/18 1224)  Resp: 18 (03/25/18 1224)  BP: 138/78 (03/25/18 1224)  SpO2: 97 % (03/25/18 1224) Vital Signs (24h Range):  Temp:  [98.2 °F (36.8 °C)-98.8 °F (37.1 °C)] 98.6 °F (37 °C)  Pulse:  [] 105  Resp:  [16-18] 18  SpO2:  [95 %-97 %] 97 %  BP: (130-159)/(78-91) 138/78     Weight: 71.9 kg (158 lb 8.2 oz)  Body mass index is 25.6 kg/m².    Intake/Output - Last 3 Shifts       03/23 0700 - 03/24 0659 03/24 0700 - 03/25 0659 03/25 0700 - 03/26 0659    P.O. 460 670     I.V. (mL/kg)  60 (0.8)     Other        731.3     Total Intake(mL/kg) 1263 (17.4) 1461.3 (20.3)     Urine (mL/kg/hr) 550 (0.3) 1735 (1) 475 (1.2)    Emesis/NG output 0 (0) 0 (0)     Other 0 (0) 0 (0)     Stool 0 (0) 0 (0) 0 (0)    Blood 0 (0) 0 (0)     Total Output 550 1735 475    Net +713 -273.7 -475           Urine Occurrence 1 x 0 x     Stool Occurrence 0 x 0 x 1 x    Emesis Occurrence 0 x 0 x           Physical Exam   Constitutional: He is oriented to person, place, and time. He appears well-developed.   Hand and temporal muscle wasting   HENT:   Head: Normocephalic.   Eyes: Pupils are equal, round, and reactive to light. No scleral icterus.   Cardiovascular: Normal rate, regular rhythm, normal heart sounds and intact distal pulses.    Pulmonary/Chest: Effort normal. No respiratory distress. He has decreased breath sounds in the right lower field and the left lower  field. He has no wheezes. He has no rhonchi. He has no rales.   Abdominal: Soft. Bowel sounds are normal. He exhibits distension. There is tenderness in the right upper quadrant.   Musculoskeletal: Normal range of motion. He exhibits edema (generalized).   Neurological: He is alert and oriented to person, place, and time.   Skin: Skin is warm and dry.   Psychiatric: He has a normal mood and affect. His behavior is normal. Judgment and thought content normal.   Nursing note and vitals reviewed.      Laboratory:  Immunosuppressants         Stop Route Frequency     mycophenolate capsule 500 mg      -- Oral 2 times daily     cycloSPORINE modified capsule 125 mg      -- Oral 2 times daily     cycloSPORINE modified (NEORAL) 25 MG capsule      03/09 1714       cycloSPORINE modified (NEORAL) 100 MG capsule      03/09 1714       cycloSPORINE modified (NEORAL) 100 MG capsule      03/09 0514       cycloSPORINE modified (NEORAL) 25 MG capsule      03/09 0514          CBC:   Recent Labs  Lab 03/25/18  0530   WBC 6.00   RBC 2.54*   HGB 8.2*   HCT 26.0*   *   *   MCH 32.3*   MCHC 31.5*     CMP:   Recent Labs  Lab 03/25/18  0530   GLU 97   CALCIUM 9.3   ALBUMIN 2.6*   PROT 6.0      K 4.5   CO2 24      *   CREATININE 4.6*   ALKPHOS 120   ALT 6*   AST 22   BILITOT 1.0     Labs within the past 24 hours have been reviewed.    Diagnostic Results:  I have personally reviewed all pertinent imaging studies.

## 2018-03-25 NOTE — ASSESSMENT & PLAN NOTE
- Poor PO intake since transplant requiring short course of TPN during previous hospital stay.   - Due to N/V and abdominal pain, multiple attempts were made to place SHANE tube with tube feeding, but patient did not tolerate TF  - Transitioned to TPN 1/16 then again on 2/24 due to poor tolerance of SHANE tube/TF  - prealbumin 7 on 1/15 --> prealbumin 22 on 3/19, monitoring weekly (Mondays)  - EGD performed 1/25 and 3/6, gastroparesis, duodenal ulcer, and congested/erythematous gastric mucosa  - Small bowel through with normal findings.  - Consulted GI for GJ tube placement, but was canceled due to ongoing fevers  - Hold Reglan and Marinol 3/8 for neuro symptoms --> restarted reglan and marinol 3/19, will need to dc marinol before discharge because not covered by insurance.  - set goals for caloric intake with patient, will try nocturnal TPN started 3/20 PM to encourage appetite during the day  - Trying to set small caloric goals daily, for now goal is 300-500 jalen/day. Ultimately needs to consume 700 jalen/day to start weaning TPN.

## 2018-03-25 NOTE — ASSESSMENT & PLAN NOTE
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  - Tbili with increase prompting ERCP 1/30 with stones and sludge, stents placed.  - Repeat ERCP 2/19 as was having recurrent fevers with bilirubin stuck at 2.0-2.2 with overall unremarkable findings seen. Did place new larger stents.   - ERCP/EUS 3/9 with metal stent placed in bile duct, stones removed.  - Tbili remains stable, 1 today. Continue actigall.

## 2018-03-26 PROBLEM — R50.9 FEVER: Status: RESOLVED | Noted: 2018-01-30 | Resolved: 2018-03-26

## 2018-03-26 PROBLEM — J90 RECURRENT PLEURAL EFFUSION ON RIGHT: Status: RESOLVED | Noted: 2018-01-11 | Resolved: 2018-03-26

## 2018-03-26 LAB
ALBUMIN SERPL BCP-MCNC: 2.6 G/DL
ALBUMIN SERPL BCP-MCNC: 3 G/DL
ALP SERPL-CCNC: 111 U/L
ALP SERPL-CCNC: 132 U/L
ALT SERPL W/O P-5'-P-CCNC: 5 U/L
ALT SERPL W/O P-5'-P-CCNC: 7 U/L
ANION GAP SERPL CALC-SCNC: 17 MMOL/L
ANION GAP SERPL CALC-SCNC: 17 MMOL/L
ANISOCYTOSIS BLD QL SMEAR: SLIGHT
AST SERPL-CCNC: 18 U/L
AST SERPL-CCNC: 21 U/L
BASO STIPL BLD QL SMEAR: ABNORMAL
BASOPHILS # BLD AUTO: 0.01 K/UL
BASOPHILS NFR BLD: 0 %
BASOPHILS NFR BLD: 0.2 %
BILIRUB SERPL-MCNC: 1 MG/DL
BILIRUB SERPL-MCNC: 1.4 MG/DL
BUN SERPL-MCNC: 117 MG/DL
BUN SERPL-MCNC: 123 MG/DL
CALCIUM SERPL-MCNC: 9.4 MG/DL
CALCIUM SERPL-MCNC: 9.9 MG/DL
CHLORIDE SERPL-SCNC: 100 MMOL/L
CHLORIDE SERPL-SCNC: 99 MMOL/L
CO2 SERPL-SCNC: 23 MMOL/L
CO2 SERPL-SCNC: 24 MMOL/L
CREAT SERPL-MCNC: 4.7 MG/DL
CREAT SERPL-MCNC: 4.8 MG/DL
CYCLOSPORINE BLD LC/MS/MS-MCNC: 204 NG/ML
DIFFERENTIAL METHOD: ABNORMAL
DIFFERENTIAL METHOD: ABNORMAL
EOSINOPHIL # BLD AUTO: 0.1 K/UL
EOSINOPHIL NFR BLD: 0 %
EOSINOPHIL NFR BLD: 1.1 %
ERYTHROCYTE [DISTWIDTH] IN BLOOD BY AUTOMATED COUNT: 17.9 %
ERYTHROCYTE [DISTWIDTH] IN BLOOD BY AUTOMATED COUNT: 18 %
EST. GFR  (AFRICAN AMERICAN): 17.7 ML/MIN/1.73 M^2
EST. GFR  (AFRICAN AMERICAN): 18.2 ML/MIN/1.73 M^2
EST. GFR  (NON AFRICAN AMERICAN): 15.3 ML/MIN/1.73 M^2
EST. GFR  (NON AFRICAN AMERICAN): 15.7 ML/MIN/1.73 M^2
GLUCOSE SERPL-MCNC: 101 MG/DL
GLUCOSE SERPL-MCNC: 87 MG/DL
HCT VFR BLD AUTO: 25.6 %
HCT VFR BLD AUTO: 28.4 %
HGB BLD-MCNC: 8.3 G/DL
HGB BLD-MCNC: 9.3 G/DL
IMM GRANULOCYTES # BLD AUTO: 0.12 K/UL
IMM GRANULOCYTES # BLD AUTO: ABNORMAL K/UL
IMM GRANULOCYTES NFR BLD AUTO: 2.2 %
IMM GRANULOCYTES NFR BLD AUTO: ABNORMAL %
LYMPHOCYTES # BLD AUTO: 1.2 K/UL
LYMPHOCYTES NFR BLD: 18 %
LYMPHOCYTES NFR BLD: 21.5 %
MAGNESIUM SERPL-MCNC: 2.8 MG/DL
MCH RBC QN AUTO: 32.2 PG
MCH RBC QN AUTO: 33.2 PG
MCHC RBC AUTO-ENTMCNC: 32.4 G/DL
MCHC RBC AUTO-ENTMCNC: 32.7 G/DL
MCV RBC AUTO: 102 FL
MCV RBC AUTO: 98 FL
MONOCYTES # BLD AUTO: 0.8 K/UL
MONOCYTES NFR BLD: 10 %
MONOCYTES NFR BLD: 13.7 %
NEUTROPHILS # BLD AUTO: 3.4 K/UL
NEUTROPHILS NFR BLD: 61.3 %
NEUTROPHILS NFR BLD: 70 %
NEUTS BAND NFR BLD MANUAL: 2 %
NRBC BLD-RTO: 0 /100 WBC
NRBC BLD-RTO: 0 /100 WBC
PHOSPHATE SERPL-MCNC: 5.2 MG/DL
PLATELET # BLD AUTO: 104 K/UL
PLATELET # BLD AUTO: 119 K/UL
PLATELET BLD QL SMEAR: ABNORMAL
PMV BLD AUTO: 12.1 FL
PMV BLD AUTO: 12.2 FL
POIKILOCYTOSIS BLD QL SMEAR: SLIGHT
POLYCHROMASIA BLD QL SMEAR: ABNORMAL
POTASSIUM SERPL-SCNC: 4.3 MMOL/L
POTASSIUM SERPL-SCNC: 4.6 MMOL/L
PREALB SERPL-MCNC: 24 MG/DL
PROT SERPL-MCNC: 5.9 G/DL
PROT SERPL-MCNC: 6.7 G/DL
RBC # BLD AUTO: 2.5 M/UL
RBC # BLD AUTO: 2.89 M/UL
SCHISTOCYTES BLD QL SMEAR: ABNORMAL
SCHISTOCYTES BLD QL SMEAR: PRESENT
SODIUM SERPL-SCNC: 140 MMOL/L
SODIUM SERPL-SCNC: 140 MMOL/L
WBC # BLD AUTO: 5.54 K/UL
WBC # BLD AUTO: 6.78 K/UL

## 2018-03-26 PROCEDURE — 63600175 PHARM REV CODE 636 W HCPCS: Performed by: NURSE PRACTITIONER

## 2018-03-26 PROCEDURE — A4217 STERILE WATER/SALINE, 500 ML: HCPCS | Performed by: NURSE PRACTITIONER

## 2018-03-26 PROCEDURE — 97535 SELF CARE MNGMENT TRAINING: CPT

## 2018-03-26 PROCEDURE — 25000003 PHARM REV CODE 250: Performed by: NURSE PRACTITIONER

## 2018-03-26 PROCEDURE — 84134 ASSAY OF PREALBUMIN: CPT

## 2018-03-26 PROCEDURE — B4185 PARENTERAL SOL 10 GM LIPIDS: HCPCS | Performed by: NURSE PRACTITIONER

## 2018-03-26 PROCEDURE — 20600001 HC STEP DOWN PRIVATE ROOM

## 2018-03-26 PROCEDURE — 80158 DRUG ASSAY CYCLOSPORINE: CPT

## 2018-03-26 PROCEDURE — 94664 DEMO&/EVAL PT USE INHALER: CPT

## 2018-03-26 PROCEDURE — 99232 SBSQ HOSP IP/OBS MODERATE 35: CPT | Mod: ,,, | Performed by: INTERNAL MEDICINE

## 2018-03-26 PROCEDURE — 97530 THERAPEUTIC ACTIVITIES: CPT

## 2018-03-26 PROCEDURE — 25000003 PHARM REV CODE 250: Performed by: PHYSICIAN ASSISTANT

## 2018-03-26 PROCEDURE — 85025 COMPLETE CBC W/AUTO DIFF WBC: CPT

## 2018-03-26 PROCEDURE — 99900035 HC TECH TIME PER 15 MIN (STAT)

## 2018-03-26 PROCEDURE — 83735 ASSAY OF MAGNESIUM: CPT

## 2018-03-26 PROCEDURE — 84100 ASSAY OF PHOSPHORUS: CPT

## 2018-03-26 PROCEDURE — 99233 SBSQ HOSP IP/OBS HIGH 50: CPT | Mod: ,,, | Performed by: NURSE PRACTITIONER

## 2018-03-26 PROCEDURE — 80053 COMPREHEN METABOLIC PANEL: CPT

## 2018-03-26 RX ORDER — PROCHLORPERAZINE MALEATE 5 MG
5 TABLET ORAL 3 TIMES DAILY PRN
Status: DISCONTINUED | OUTPATIENT
Start: 2018-03-26 | End: 2018-04-10 | Stop reason: HOSPADM

## 2018-03-26 RX ADMIN — OXYCODONE HYDROCHLORIDE 5 MG: 5 TABLET ORAL at 06:03

## 2018-03-26 RX ADMIN — URSODIOL 300 MG: 300 CAPSULE ORAL at 08:03

## 2018-03-26 RX ADMIN — OXYCODONE HYDROCHLORIDE 5 MG: 5 TABLET ORAL at 09:03

## 2018-03-26 RX ADMIN — OXYCODONE HYDROCHLORIDE 5 MG: 5 TABLET ORAL at 03:03

## 2018-03-26 RX ADMIN — LEVOTHYROXINE SODIUM 75 MCG: 75 TABLET ORAL at 06:03

## 2018-03-26 RX ADMIN — RETINOL, ERGOCALCIFEROL, .ALPHA.-TOCOPHEROL ACETATE, DL-, PHYTONADIONE, ASCORBIC ACID, NIACINAMIDE, RIBOFLAVIN 5-PHOSPHATE SODIUM, THIAMINE HYDROCHLORIDE, PYRIDOXINE HYDROCHLORIDE, DEXPANTHENOL, BIOTIN, FOLIC ACID, AND CYANOCOBALAMIN: KIT at 11:03

## 2018-03-26 RX ADMIN — MYCOPHENOLATE MOFETIL 500 MG: 250 CAPSULE ORAL at 08:03

## 2018-03-26 RX ADMIN — METOCLOPRAMIDE HYDROCHLORIDE 10 MG: 5 SOLUTION ORAL at 09:03

## 2018-03-26 RX ADMIN — BISACODYL 10 MG: 5 TABLET, COATED ORAL at 08:03

## 2018-03-26 RX ADMIN — DOCUSATE SODIUM 100 MG: 100 CAPSULE, LIQUID FILLED ORAL at 09:03

## 2018-03-26 RX ADMIN — SOYBEAN OIL 250 ML: 20 INJECTION, SOLUTION INTRAVENOUS at 11:03

## 2018-03-26 RX ADMIN — CYCLOSPORINE 125 MG: 100 CAPSULE, LIQUID FILLED ORAL at 05:03

## 2018-03-26 RX ADMIN — MYCOPHENOLATE MOFETIL 500 MG: 250 CAPSULE ORAL at 09:03

## 2018-03-26 RX ADMIN — METOCLOPRAMIDE HYDROCHLORIDE 10 MG: 5 SOLUTION ORAL at 11:03

## 2018-03-26 RX ADMIN — PREDNISONE 15 MG: 5 TABLET ORAL at 08:03

## 2018-03-26 RX ADMIN — URSODIOL 300 MG: 300 CAPSULE ORAL at 09:03

## 2018-03-26 RX ADMIN — METOCLOPRAMIDE HYDROCHLORIDE 10 MG: 5 SOLUTION ORAL at 05:03

## 2018-03-26 RX ADMIN — TORSEMIDE 50 MG: 20 TABLET ORAL at 08:03

## 2018-03-26 RX ADMIN — OXYCODONE HYDROCHLORIDE 5 MG: 5 TABLET ORAL at 12:03

## 2018-03-26 RX ADMIN — ERYTHROPOIETIN 12600 UNITS: 20000 INJECTION, SOLUTION INTRAVENOUS; SUBCUTANEOUS at 10:03

## 2018-03-26 RX ADMIN — OXYCODONE HYDROCHLORIDE 5 MG: 5 TABLET ORAL at 11:03

## 2018-03-26 RX ADMIN — DOCUSATE SODIUM 100 MG: 100 CAPSULE, LIQUID FILLED ORAL at 08:03

## 2018-03-26 RX ADMIN — LEVETIRACETAM 500 MG: 100 SOLUTION ORAL at 08:03

## 2018-03-26 RX ADMIN — METOCLOPRAMIDE HYDROCHLORIDE 10 MG: 5 SOLUTION ORAL at 06:03

## 2018-03-26 RX ADMIN — LEVETIRACETAM 500 MG: 100 SOLUTION ORAL at 09:03

## 2018-03-26 RX ADMIN — ONDANSETRON HYDROCHLORIDE 4 MG: 2 INJECTION, SOLUTION INTRAMUSCULAR; INTRAVENOUS at 12:03

## 2018-03-26 RX ADMIN — DRONABINOL 2.5 MG: 2.5 CAPSULE ORAL at 09:03

## 2018-03-26 RX ADMIN — CYCLOSPORINE 125 MG: 100 CAPSULE, LIQUID FILLED ORAL at 08:03

## 2018-03-26 NOTE — PROGRESS NOTES
[facility]  Nephrology  Progress Note    Patient Name: Jhonny Diana  MRN: [id number]  Admission Date: 1/11/2018  Hospital Length of Stay: 74 days  Attending Provider: Nathanael Medina MD   Primary Care Physician: Primary Doctor No  Principal Problem:Fever    Subjective:     HPI: Jhonny Diana is a 28 y/o  male with PMHx relevant for ETOH cirrhosis.  S/p DDLT 10/19/2017; c/b seizures (swtiched off prograf to cyclo), ATN dialysis dependant in iHD MWF, last HD on 1/10, anuric, superficial wound infection s/p wound vac to chevron incision, and multiple admissions for fevers finally dx with peritonitis in November (WBC 5000, 75% PNM) neg for bile leak. HE gets recurrent paracentesis and has peritoneal drain. Other pertinent PMH current wound vac in place 2/2 wound infection, malnutrition requiring TPN for short course and ongoing hypoalbuminemia, and seizure activity while on prograf and has since been switched to cyclosporine without reoccurrence.  He is admitted to LTx service secondary to fever, abdominal pain, and N/V. He reports fever (103) for 1 day prior. Overnight, he developed N/V, reports small amount of green emesis with new left sided pain. He also endorses worsening SOB with exertion. He was scheduled as an outpatient for follow up paracentesis and IR drainage of fluid collection. CXR in ER shows large pleural effusion with subsegmental atelectasis. His ANC is 900. Broad spectrum antibiotics initiated in ED. Nephrology consulted for LAURA dialysis dependant co management. He dialyses at [facility] under the care of Dr. Peterson via [facility] rowe.      Interval History:   UOP continues to improve to Torsemide daily (2.2L/24h) but remains with poor metabolic clearance.  BUN continues trend up daily, noted that patient Is on TPN.    24 hour CrCl completed on Friday and results are poor and patient will need continued dialysis for the time being.      Review of patient's allergies indicates:    Allergen Reactions    Bactrim [sulfamethoxazole-trimethoprim] Other (See Comments)     Mookie Trell Syndrome     Current Facility-Administered Medications   Medication Frequency    0.9%  NaCl infusion PRN    acetaminophen tablet 650 mg Q6H PRN    albuterol-ipratropium 2.5mg-0.5mg/3mL nebulizer solution 3 mL Q4H PRN    bisacodyl EC tablet 10 mg Daily    bisacodyl suppository 10 mg Daily PRN    butalbital-acetaminophen-caffeine -40 mg per tablet 1 tablet Q4H PRN    cycloSPORINE modified capsule 125 mg BID    dextrose 50% injection 12.5 g PRN    dextrose 50% injection 25 g PRN    diphenhydrAMINE-zinc acetate 1-0.1% cream TID PRN    docusate sodium capsule 100 mg BID    dronabinol capsule 2.5 mg QHS    epoetin maurisio injection 12,600 Units Every Mon, Wed, Fri    ergocalciferol capsule 50,000 Units Q7 Days    fat emulsion 20% infusion 250 mL Once    glucagon (human recombinant) injection 1 mg PRN    glucose chewable tablet 16 g PRN    glucose chewable tablet 24 g PRN    heparin (porcine) injection 1,000 Units PRN    heparin (porcine) injection 5,000 Units Q8H    levetiracetam oral soln Soln 500 mg BID    levothyroxine tablet 75 mcg Before breakfast    metoclopramide HCl 5 mg/5 mL solution 10 mg QID (AC & HS)    mycophenolate capsule 500 mg BID    naloxone 0.4 mg/mL injection 0.2 mg PRN    omeprazole capsule 40 mg QAM    ondansetron disintegrating tablet 8 mg Q6H PRN    ondansetron injection 4 mg Q6H PRN    oxyCODONE immediate release tablet 5 mg Q4H PRN    oxyCODONE immediate release tablet 5 mg Nightly PRN    polyethylene glycol packet 17 g BID    predniSONE tablet 15 mg Daily    prochlorperazine injection Soln 10 mg Q6H PRN    prochlorperazine tablet 5 mg TID PRN    psyllium husk (aspartame) 3.4 gram PwPk 1 packet BID    simethicone chewable tablet 80 mg TID PRN    sodium chloride 0.9% flush 3 mL PRN    sodium chloride 0.9% flush 3 mL PRN    torsemide tablet 50 mg Daily     TPN ADULT CENTRAL LINE CUSTOM Continuous    ursodiol oral suspension (conc: 60 mg/mL) 300 mg BID       Objective:     Vital Signs (Most Recent):  Temp: 98.9 °F (37.2 °C) (03/26/18 0811)  Pulse: 93 (03/26/18 0811)  Resp: 16 (03/26/18 0811)  BP: (!) 139/94 (03/26/18 0811)  SpO2: 96 % (03/26/18 0811)  O2 Device (Oxygen Therapy): room air (03/26/18 0811) Vital Signs (24h Range):  Temp:  [97.5 °F (36.4 °C)-98.9 °F (37.2 °C)] 98.9 °F (37.2 °C)  Pulse:  [] 93  Resp:  [15-18] 16  SpO2:  [95 %-97 %] 96 %  BP: (139-162)/(85-95) 139/94     Weight: 70.2 kg (154 lb 12.2 oz) (03/26/18 0615)  Body mass index is 24.99 kg/m².  Body surface area is 1.81 meters squared.    I/O last 3 completed shifts:  In: 3053.8 [P.O.:1030; I.V.:60]  Out: 3315 [Urine:3315]    Physical Exam   Constitutional: He is oriented to person, place, and time.   HENT:   Head: Atraumatic.   Mouth/Throat: No oropharyngeal exudate.   Mild facial edema   Eyes: No scleral icterus.   Cardiovascular: Normal rate and regular rhythm.    Pulmonary/Chest: Effort normal and breath sounds normal. No respiratory distress. He has no wheezes. He has no rales.   Abdominal: Soft. There is no tenderness. There is no rebound.   acites   Musculoskeletal: Edema: mild BLE edema.   Lymphadenopathy:     He has no cervical adenopathy.   Neurological: He is alert and oriented to person, place, and time.   Skin: He is not diaphoretic.       Significant Labs:  CBC:   Recent Labs  Lab 03/26/18  0500   WBC 5.54   RBC 2.50*   HGB 8.3*   HCT 25.6*   *   *   MCH 33.2*   MCHC 32.4     CMP:   Recent Labs  Lab 03/26/18  0500      CALCIUM 9.4   ALBUMIN 2.6*   PROT 5.9*      K 4.3   CO2 24   CL 99   *   CREATININE 4.7*   ALKPHOS 111   ALT 5*   AST 18   BILITOT 1.0            Assessment/Plan:     Acute renal failure with tubular necrosis    LAURA dialysisi dependant since 1016/2017  HD on TTS [facility] under the care of Dr. Peterson  On HD for: 2.5 mo since  10/16/2017  Duration of outpatient dialysis session - 3.5 hrs  EDW - TBD  Residual Renal Function - yes     24 hour CrCl completed on Friday with result of only 6 ml/min.  UOP is improvement with remains with poor metabolic clearance.    Plan:  Will plan for HD treatment tomorrow for metabolic clearance.  Ok from renal standpoint to continue current Torsemide dose for volume management.   for time being, will plan for twice weekly scheduled dialysis sessions.    Anemia of CKD  -continue VIDA.                   Derrick Love NP  Nephrology  [facility]  Pager:  [phone]      I have reviewed and concur with the NP's history, physical, assessment, and plan. I have personally interviewed and examined the patient at bedside.

## 2018-03-26 NOTE — ASSESSMENT & PLAN NOTE
- HD resumed on previous admission. Anuric on admission and dialyzes M-W-F.  - Nephrology following.    - Goal is for kidney txp in future- currently not a candidate due to health status.  - urine output has improved over last several days since 3/16, around 900ml uop daily.    - HD held 3/21 & 3/23, trialed lasix but patient refused lasix, prefers diamox  - 24 hour CrCl completed 3/23. Diamox that afternoon after collection completed.   - Nephrology ok with torsemide 50 mg daily. Started 3/24.  - BUN trending up, will plan for HD to clear toxins per nephrology

## 2018-03-26 NOTE — PROGRESS NOTES
[facility]  Liver Transplant  Progress Note    Patient Name: Jhonny Diana  MRN: [id number]  Admission Date: 2018  Hospital Length of Stay: 74 days  Code Status: Full Code  Primary Care Provider: Primary Doctor No  Post-Operative Day: 158    ORGAN:   LIVER  Disease Etiology: Acute Alcoholic Hepatitis  Donor Type:    - Brain Death  CDC High Risk:   No  Donor CMV Status:   Donor CMV Status: Positive  Donor HBcAB:   Negative  Donor HCV Status:   Negative  Whole or Partial: Whole Liver  Biliary Anastomosis: End to End  Arterial Anatomy: Standard  Subjective:     History of Present Illness:  Jhonny Diana is a 26 y/o male with past medical history of alcoholic cirrhosis.  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 Found to have peritonitis in November (WBC 5000, 75% PNM) neg for bile leak. He was treated initially with vanc/cefepime. Repeat cell counts  with some improvement (WBC 1400, 45% PNM). He has undergone multiple paracenteses as well as abscess drainage of perihepatic fluid collections and treated with antimicrobial therapy but no positive cultures. Of note, biliary stricture also identified and ERCP performed on 2017 with sphincterotomy and biliary stent placed. Liver tests still have not normalized despite intervention, bilirubin and AP remain elevated. Other pertinent PMH current wound vac in place 2/2 wound infection, malnutrition requiring TPN for short course and ongoing hypoalbuminemia, and seizure activity while on prograf and has since been switched to cyclosporine without reoccurrence.  He presented to the ER for fever, abdominal pain, and N/V. He reports fever (103) for 1  day prior. Overnight, he developed N/V, reports small amount of green emesis with new left sided pain. He also endorses worsening SOB with exertion. He was scheduled as an outpatient for follow up paracentesis and IR drainage of fluid collection. CXR in ER shows large pleural effusion with subsegmental atelectasis. Infectious work up initiated in ER. His ANC is 900. Broad spectrum antibiotics initiated in ED. At admit, he denied chest pain, palpitations, diarrhea, constipation, back pain, or any sick contacts.    Hospital Course:  Unclear cause of ongoing fevers. Multiple infectious, diagnostic testing completed. Taken to OR for ex lap 2/6, loculated fluid collections found, cultures NGTD. Multiple blood cultures with NGTD. Thoracentesis 1/11, 1/18, 1/31, and 2/16, cultures NGTD. Paracentesis 1/11, 1/19, and 2/5, cultures NGTD. negative fungal markers/quant gold intermediate. Negative TTE.  IR drainage 1/11, cultures NGTD. Continued with intermittent fevers despite broad spectrum antibiotics that were initially started 1/11, then broadened antibiotics 2/22 from vanc/zosyn to ertapenem. Multiple CT scan of abd obtained that were unremarkable. Tagged WBCs scan 2/27 without source of infection found. He had several episodes of neutropenia requiring neupogen, last given 2/28. 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 Monitoring weekly CMV PCR. Chimerism studies obtained 3/1 to assess for GVHD, which was negative, EBV PCR negative, adenovirus negative. In addition to fevers, he has consistently c/o abdominal pain and N/V. Tbili has remain elevated post-op requiring multiple ERCPs. ERCPs performed this admission on 1/30, 2/19, and 3/9. ERCPs with sludge and stones with stents placed. Last ERCP 3/9 with metal stent placed into CBD and stones removed. His ertapenem was d/cd 3/7 due to AMS. Following ERCP 3/9, he was started on cefepime and flagyl  for possible cholangitis. He has remained afebrile since 3/10. He completed cefepime/flagyl after 3 weeks on 3/19 & 3/20. Due to elevated enzymes, he had a liver biopsy 1/23 consistent with cholestatic hepatitis (no cultures obtained during biopsy). GI was consulted for persistent N/V and malnutrition. EGD performed 1/25 and 3/6, EGD 3/6 with normal esophagus congested, erythematous and nodular mucosa in the stomach, one duodenal ulcer with a clean ulcer base (Emir Class III), biopsies taken, negative for CMV and GVHD. Due to N/V and abdominal pain, multiple attempts were made to place SHANE tube with tube feeding, but patient did not tolerate TF. Therefore, he was transitioned to TPN 1/16 then again on 2/24. He also has chronic constipation. Small bowel follow through study with normal findings. Following EGD 1/25, he was started on a PPI, miralax, and Metamucil, but he did not tolerate meds. For appetite stimulation he was trialed on marinol. He also did well with reglan before meals and at bedtime. Early post-op liver transplant surgery, he was switched to cyclosporine for seizures. During this admission, he was transitioned back to prograf 2/24 with keppra on board. Starting 3/1 he developed stuttering speech, disorientation. CT head 3/1 and MRI head 3/6 unremarkable. EEG 3/8 with no seizure activity. He started on rapamune and stress dose steroids 3/10, then ultimately returned to cyclosporine 3/13 and rapamune d/cd same day. Since transitioning back to cyclosporine, his mental status has remained stable. During admission, patient was evaluated by [facility] for future kidney transplant, and he was deemed to be a suitable candidate once medically stable and cleared. He dialyzes on a M-W-F schedule. He was anuric on admission, but starting producing urine around 3/16 and continues to produce around 800-900cc daily.     Interval History: no acute events overnight. Slowly trying to improve caloric intake, goal 300-500  jalen/day, goal is to wean TPN once 700 jalen/day. Continue nocturnal TPN/Lipids for now. Adjust TPN as now uremic.  Continue reglan and marinol. Last HD Monday 3/19 (MWF scheduled, held WF). Cr 4.7 from 4.8. UOP >2.1L over the past 24 hr. Nephrology following. Torsemide 50 mg daily started yesterday. Plan for HD to clear toxins.  He remains afebrile off antibiotics. No issues with restarted cellcept 500 mg bid. Monitor.     Scheduled Meds:   bisacodyl  10 mg Oral Daily    cycloSPORINE modified  125 mg Oral BID    docusate sodium  100 mg Oral BID    dronabinol  2.5 mg Oral QHS    epoetin maurisio (PROCRIT) injection  12,600 Units Intravenous Every Mon, Wed, Fri    ergocalciferol  50,000 Units Oral Q7 Days    fat emulsion 20%  250 mL Intravenous Once    heparin (porcine)  5,000 Units Subcutaneous Q8H    levetiracetam oral soln  500 mg Oral BID    levothyroxine  75 mcg Oral Before breakfast    metoclopramide HCl  10 mg Oral QID (AC & HS)    mycophenolate  500 mg Oral BID    omeprazole  40 mg Oral QAM    polyethylene glycol  17 g Oral BID    predniSONE  15 mg Oral Daily    psyllium husk (aspartame)  3.4 g Oral BID    torsemide  50 mg Oral Daily    ursodiol  300 mg Oral BID     Continuous Infusions:   TPN ADULT CENTRAL LINE CUSTOM       PRN Meds:sodium chloride 0.9%, acetaminophen, albuterol-ipratropium 2.5mg-0.5mg/3mL, bisacodyl, butalbital-acetaminophen-caffeine -40 mg, dextrose 50%, dextrose 50%, diphenhydrAMINE-zinc acetate 1-0.1%, glucagon (human recombinant), glucose, glucose, heparin (porcine), naloxone, ondansetron, ondansetron, oxyCODONE, oxyCODONE, prochlorperazine, prochlorperazine, simethicone, sodium chloride 0.9%, sodium chloride 0.9%    Review of Systems   Constitutional: Positive for activity change, appetite change and fatigue. Negative for chills and fever.   HENT: Negative for congestion and facial swelling.    Eyes: Negative for pain, discharge and visual disturbance.   Respiratory:  Negative.  Negative for cough, chest tightness, shortness of breath and wheezing.    Cardiovascular: Negative.  Negative for chest pain, palpitations and leg swelling.   Gastrointestinal: Positive for abdominal distention, abdominal pain and constipation. Negative for diarrhea, nausea and vomiting.   Endocrine: Negative.    Genitourinary: Negative for decreased urine volume, difficulty urinating, dysuria, hematuria and urgency.   Musculoskeletal: Negative for back pain, gait problem, neck pain and neck stiffness.   Skin: Negative for color change and pallor.   Allergic/Immunologic: Positive for immunocompromised state.   Neurological: Negative for dizziness, seizures, weakness, light-headedness and headaches.   Psychiatric/Behavioral: Positive for dysphoric mood. Negative for behavioral problems, confusion, hallucinations, sleep disturbance and suicidal ideas. The patient is not nervous/anxious.      Objective:     Vital Signs (Most Recent):  Temp: 98.5 °F (36.9 °C) (03/26/18 1516)  Pulse: 97 (03/26/18 1516)  Resp: 16 (03/26/18 1516)  BP: 139/86 (03/26/18 1516)  SpO2: 96 % (03/26/18 1516) Vital Signs (24h Range):  Temp:  [97.5 °F (36.4 °C)-98.9 °F (37.2 °C)] 98.5 °F (36.9 °C)  Pulse:  [] 97  Resp:  [15-18] 16  SpO2:  [95 %-98 %] 96 %  BP: (139-162)/(83-95) 139/86     Weight: 70.2 kg (154 lb 12.2 oz)  Body mass index is 24.99 kg/m².    Intake/Output - Last 3 Shifts       03/24 0700 - 03/25 0659 03/25 0700 - 03/26 0659 03/26 0700 - 03/27 0659    P.O. 670 360     I.V. (mL/kg) 60 (0.8)      .3 1232.5     Total Intake(mL/kg) 1461.3 (20.3) 1592.5 (22.7)     Urine (mL/kg/hr) 1735 (1) 2180 (1.3) 1135 (1.8)    Emesis/NG output 0 (0)      Other 0 (0)      Stool 0 (0) 0 (0) 0 (0)    Blood 0 (0)      Total Output 1735 2180 1135    Net -273.7 -587.5 -1135           Urine Occurrence 0 x      Stool Occurrence 0 x 2 x 1 x    Emesis Occurrence 0 x            Physical Exam   Constitutional: He is oriented to person,  place, and time. He appears well-developed.   Hand and temporal muscle wasting   HENT:   Head: Normocephalic.   Eyes: Pupils are equal, round, and reactive to light. No scleral icterus.   Cardiovascular: Normal rate, regular rhythm, normal heart sounds and intact distal pulses.    Pulmonary/Chest: Effort normal and breath sounds normal. No respiratory distress. He has no wheezes. He has no rhonchi. He has no rales.   Abdominal: Soft. Bowel sounds are normal. He exhibits distension. There is tenderness in the right upper quadrant.   Musculoskeletal: Normal range of motion. He exhibits edema (generalized).   Neurological: He is alert and oriented to person, place, and time.   Skin: Skin is warm and dry.   Psychiatric: He has a normal mood and affect. His behavior is normal. Judgment and thought content normal.   Nursing note and vitals reviewed.      Laboratory:  Immunosuppressants         Stop Route Frequency     mycophenolate capsule 500 mg      -- Oral 2 times daily     cycloSPORINE modified capsule 125 mg      -- Oral 2 times daily     cycloSPORINE modified (NEORAL) 25 MG capsule      03/09 1714       cycloSPORINE modified (NEORAL) 100 MG capsule      03/09 1714       cycloSPORINE modified (NEORAL) 100 MG capsule      03/09 0514       cycloSPORINE modified (NEORAL) 25 MG capsule      03/09 0514          CBC:     Recent Labs  Lab 03/26/18  0500   WBC 5.54   RBC 2.50*   HGB 8.3*   HCT 25.6*   *   *   MCH 33.2*   MCHC 32.4     CMP:     Recent Labs  Lab 03/26/18  0500      CALCIUM 9.4   ALBUMIN 2.6*   PROT 5.9*      K 4.3   CO2 24   CL 99   *   CREATININE 4.7*   ALKPHOS 111   ALT 5*   AST 18   BILITOT 1.0     Labs within the past 24 hours have been reviewed.    Diagnostic Results:  I have personally reviewed all pertinent imaging studies.    Assessment/Plan:     Severe protein-calorie malnutrition    - Poor PO intake since transplant requiring short course of TPN during previous  hospital stay.   - Due to N/V and abdominal pain, multiple attempts were made to place SHANE tube with tube feeding, but patient did not tolerate TF  - Transitioned to TPN 1/16 then again on 2/24 due to poor tolerance of SHANE tube/TF  - prealbumin 7 on 1/15 --> prealbumin 22 on 3/19, monitoring weekly (Mondays)  - EGD performed 1/25 and 3/6, gastroparesis, duodenal ulcer, and congested/erythematous gastric mucosa  - Small bowel through with normal findings.  - Consulted GI for GJ tube placement, but was canceled due to ongoing fevers  - Hold Reglan and Marinol 3/8 for neuro symptoms --> restarted reglan and marinol 3/19, will need to dc marinol before discharge because not covered by insurance.  - set goals for caloric intake with patient, will try nocturnal TPN started 3/20 PM to encourage appetite during the day  - Trying to set small caloric goals daily, for now goal is 300-500 jalen/day. Ultimately needs to consume 700 jalen/day to start weaning TPN.         Acute renal failure with tubular necrosis    - HD resumed on previous admission. Anuric on admission and dialyzes M-W-F.  - Nephrology following.    - Goal is for kidney txp in future- currently not a candidate due to health status.  - urine output has improved over last several days since 3/16, around 900ml uop daily.    - HD held 3/21 & 3/23, trialed lasix but patient refused lasix, prefers diamox  - 24 hour CrCl completed 3/23. Diamox that afternoon after collection completed.   - Nephrology ok with torsemide 50 mg daily. Started 3/24.  - BUN trending up, will plan for HD to clear toxins per nephrology        Other cytomegaloviral diseases    - detected at 370 on CMV PCR 2/1.  - case discussed with ID and will hold off on treatment at this time given low WBC.   - undetected CMV PCR 2/8, 2/15, 2/22, 3/8   - continue Valcyte 200 mg every Mon, Wed, Fri.  - Per ID, valcyte dose decreased to ppx dose on 2/18--Valcyte d/c'd 2/25  - Repeat scope 3/6 to assess  persistent N/V and possible CMV causing these symptoms. Biopsies thus far normal, cmv stains negative.  - continue to monitor weekly CMV PCR's, last PCR neg from 3/15, recent level from 3/22 undetected.        Liver transplanted    - Post op course complicated by fevers and hyperbilirubinemia, AST/ALT stable.  - see biliary stricture of transplanted liver  - Liver biopsy 1/23 without rejection.  - Paracentesis 3/6 negative for infection, wbc 28 segs 3%.  - ERCP, EUS biopsy 3/9/18 - no rejection, mild cholestatic hepatitis seen.   - T bili improved to 1 today.        Constipation    - chronic  - encourage ambulation, stool softeners and laxatives, often refuses some meds 2/2 nausea, will often require suppository or brown bomb enema to have a BM.  - Small bowel follow through with normal findings.        Long-term use of immunosuppressant medication    - Maintenance IS with cyclosporine --> transitioned to Prograf 2/24 for continued nausea but did not tolerate from neuro standpoint.  PT IS NOT A CANDIDATE FOR TACROLIMUS.    - Transitioned back to cyclosporine 3/1  - D/c cyclosporine, started rapa & stress dose steroids 3/10 for continued confusion  - Mental status now improved, re-trial cyclosporine 3/13, d/c rapa 3/13  - Transition back to prednisone taper from stress dose steroids 3/15 --> will decrease by 5 mg q week.  - Restarted low dose MMF 3/23. Pt tolerating well.        Prophylactic immunotherapy    - See long term use of immunosuppression.         Anemia of chronic disease    - 1 U PRBC 3/12.  - Monitor daily labs. H/H stable, transfuse PRN.         Biliary stricture of transplanted liver    - ERCP 12/6 with post-anastomosis stricture with stent placement.  - Tbili with increase prompting ERCP 1/30 with stones and sludge, stents placed.  - Repeat ERCP 2/19 as was having recurrent fevers with bilirubin stuck at 2.0-2.2 with overall unremarkable findings seen. Did place new larger stents.   - ERCP/EUS 3/9  with metal stent placed in bile duct, stones removed.  - Tbili remains stable, 1 today. Continue actigall.        Seizure    - 3/1, patient with stuttering speech, trouble recalling specific words when speaking. Does have hx of seizure (on keppra) previously while on prograf. Switched to prograf over the weekend from cyclosporine. Neuro exam unremarkable.   - CT head without contrast obtained, unremarkable.   - In light of these symptoms, prograf d/c, restart cyclosporine 3/1.  - remains w/o seizure. Cont to have stutter/garbled speech/difficulty finding words.  Worse post EGD as sedation was given.  - MRI 3/6 unremarkable.  - EEG completed 3/8 - mild, generalized, non-specific cerebral dysfunction, no epileptiform activity.  - monitor closely. VSS.        At risk for opportunistic infections    - see other cytomegaloviral disease.  - Pentam given 2/24.  - D/C Isavu 3/21.        Delayed surgical wound healing    - Wd vac removed 1/12/18.    - Wound healing well.   - Changing dressing q Tuesday, last 3/20.        Vitamin D deficiency    - Continue ergo 50K weekly.         Kidney transplant candidate              Nausea and vomiting    - h/o constipation, abdominal pain, and N/V  - worsened with SHANE tube and TF --> now on TPN  - encourage bowel regimen  - EGD performed 1/25 and 3/6, gastroparesis, duodenal ulcer, and congested/erythematous gastric mucosa  - Small bowel through with normal findings  - Continue reglan and marinol scheduled, anti-emetics PRN.   - If cont with GI symptoms then may need to consider Flex sig/ C-scope to further eval.             VTE Risk Mitigation         Ordered     heparin (porcine) injection 1,000 Units  As needed (PRN)     Route:  Intra-Catheter        03/12/18 1134     heparin (porcine) injection 5,000 Units  Every 8 hours     Route:  Subcutaneous        03/08/18 1622     Medium Risk of VTE  Once      01/11/18 0351     Place sequential compression device  Until discontinued       01/11/18 0351          The patients clinical status was discussed at multidisplinary rounds, involving transplant surgery, transplant medicine, pharmacy, nursing, nutrition, and social work    Discharge Planning:  Monitor kidney function --> may need outpt HD  Monitor HH needs vs rehab for deconditioned status      Seda Camacho, NP  Liver Transplant  [facility]

## 2018-03-26 NOTE — SUBJECTIVE & OBJECTIVE
Scheduled Meds:   bisacodyl  10 mg Oral Daily    cycloSPORINE modified  125 mg Oral BID    docusate sodium  100 mg Oral BID    dronabinol  2.5 mg Oral QHS    epoetin maurisio (PROCRIT) injection  12,600 Units Intravenous Every Mon, Wed, Fri    ergocalciferol  50,000 Units Oral Q7 Days    fat emulsion 20%  250 mL Intravenous Once    heparin (porcine)  5,000 Units Subcutaneous Q8H    levetiracetam oral soln  500 mg Oral BID    levothyroxine  75 mcg Oral Before breakfast    metoclopramide HCl  10 mg Oral QID (AC & HS)    mycophenolate  500 mg Oral BID    omeprazole  40 mg Oral QAM    polyethylene glycol  17 g Oral BID    predniSONE  15 mg Oral Daily    psyllium husk (aspartame)  3.4 g Oral BID    torsemide  50 mg Oral Daily    ursodiol  300 mg Oral BID     Continuous Infusions:   TPN ADULT CENTRAL LINE CUSTOM       PRN Meds:sodium chloride 0.9%, acetaminophen, albuterol-ipratropium 2.5mg-0.5mg/3mL, bisacodyl, butalbital-acetaminophen-caffeine -40 mg, dextrose 50%, dextrose 50%, diphenhydrAMINE-zinc acetate 1-0.1%, glucagon (human recombinant), glucose, glucose, heparin (porcine), naloxone, ondansetron, ondansetron, oxyCODONE, oxyCODONE, prochlorperazine, prochlorperazine, simethicone, sodium chloride 0.9%, sodium chloride 0.9%    Review of Systems   Constitutional: Positive for activity change, appetite change and fatigue. Negative for chills and fever.   HENT: Negative for congestion and facial swelling.    Eyes: Negative for pain, discharge and visual disturbance.   Respiratory: Negative.  Negative for cough, chest tightness, shortness of breath and wheezing.    Cardiovascular: Negative.  Negative for chest pain, palpitations and leg swelling.   Gastrointestinal: Positive for abdominal distention, abdominal pain and constipation. Negative for diarrhea, nausea and vomiting.   Endocrine: Negative.    Genitourinary: Negative for decreased urine volume, difficulty urinating, dysuria, hematuria and  urgency.   Musculoskeletal: Negative for back pain, gait problem, neck pain and neck stiffness.   Skin: Negative for color change and pallor.   Allergic/Immunologic: Positive for immunocompromised state.   Neurological: Negative for dizziness, seizures, weakness, light-headedness and headaches.   Psychiatric/Behavioral: Positive for dysphoric mood. Negative for behavioral problems, confusion, hallucinations, sleep disturbance and suicidal ideas. The patient is not nervous/anxious.      Objective:     Vital Signs (Most Recent):  Temp: 98.5 °F (36.9 °C) (03/26/18 1516)  Pulse: 97 (03/26/18 1516)  Resp: 16 (03/26/18 1516)  BP: 139/86 (03/26/18 1516)  SpO2: 96 % (03/26/18 1516) Vital Signs (24h Range):  Temp:  [97.5 °F (36.4 °C)-98.9 °F (37.2 °C)] 98.5 °F (36.9 °C)  Pulse:  [] 97  Resp:  [15-18] 16  SpO2:  [95 %-98 %] 96 %  BP: (139-162)/(83-95) 139/86     Weight: 70.2 kg (154 lb 12.2 oz)  Body mass index is 24.99 kg/m².    Intake/Output - Last 3 Shifts       03/24 0700 - 03/25 0659 03/25 0700 - 03/26 0659 03/26 0700 - 03/27 0659    P.O. 670 360     I.V. (mL/kg) 60 (0.8)      .3 1232.5     Total Intake(mL/kg) 1461.3 (20.3) 1592.5 (22.7)     Urine (mL/kg/hr) 1735 (1) 2180 (1.3) 1135 (1.8)    Emesis/NG output 0 (0)      Other 0 (0)      Stool 0 (0) 0 (0) 0 (0)    Blood 0 (0)      Total Output 1735 2180 1135    Net -273.7 -587.5 -1135           Urine Occurrence 0 x      Stool Occurrence 0 x 2 x 1 x    Emesis Occurrence 0 x            Physical Exam   Constitutional: He is oriented to person, place, and time. He appears well-developed.   Hand and temporal muscle wasting   HENT:   Head: Normocephalic.   Eyes: Pupils are equal, round, and reactive to light. No scleral icterus.   Cardiovascular: Normal rate, regular rhythm, normal heart sounds and intact distal pulses.    Pulmonary/Chest: Effort normal and breath sounds normal. No respiratory distress. He has no wheezes. He has no rhonchi. He has no rales.    Abdominal: Soft. Bowel sounds are normal. He exhibits distension. There is tenderness in the right upper quadrant.   Musculoskeletal: Normal range of motion. He exhibits edema (generalized).   Neurological: He is alert and oriented to person, place, and time.   Skin: Skin is warm and dry.   Psychiatric: He has a normal mood and affect. His behavior is normal. Judgment and thought content normal.   Nursing note and vitals reviewed.      Laboratory:  Immunosuppressants         Stop Route Frequency     mycophenolate capsule 500 mg      -- Oral 2 times daily     cycloSPORINE modified capsule 125 mg      -- Oral 2 times daily     cycloSPORINE modified (NEORAL) 25 MG capsule      03/09 1714       cycloSPORINE modified (NEORAL) 100 MG capsule      03/09 1714       cycloSPORINE modified (NEORAL) 100 MG capsule      03/09 0514       cycloSPORINE modified (NEORAL) 25 MG capsule      03/09 0514          CBC:     Recent Labs  Lab 03/26/18  0500   WBC 5.54   RBC 2.50*   HGB 8.3*   HCT 25.6*   *   *   MCH 33.2*   MCHC 32.4     CMP:     Recent Labs  Lab 03/26/18  0500      CALCIUM 9.4   ALBUMIN 2.6*   PROT 5.9*      K 4.3   CO2 24   CL 99   *   CREATININE 4.7*   ALKPHOS 111   ALT 5*   AST 18   BILITOT 1.0     Labs within the past 24 hours have been reviewed.    Diagnostic Results:  I have personally reviewed all pertinent imaging studies.

## 2018-03-26 NOTE — PLAN OF CARE
Problem: Occupational Therapy Goal  Goal: Occupational Therapy Goal  Goals to be met by:  2 Weeks -- 3/24/2018; Remaining Goals extended 1 week -- 2018    Patient will increase functional independence with ADLs by performin. Supine to sit with Supervision.--met  2. Sit to Stand transfers with Supervision- met 3/21/18  3. Toilet transfer to toilet with Supervision- met 3/21/18  4. Grooming while standing with Supervision-met 3/21/18 (using sink/walker to stabilize  5. UE Dressing with Supervision.- met 3/12/18             Revised 3-21-18: UE dress with spvn in standing   6. LE Dressing with Minimal Assistance-met 3/12/18,             Revised 3/21/18: LE dress with spvn standing  Added goals on 3/21/18:  1)increase dynamic balance to grasp 5/5 ADL items from closet/above shoulder level without UE support with spvn -- in progress          Outcome: Ongoing (interventions implemented as appropriate)  Pt progressing well towards all functional outcomes     Comments: Cont OT POC

## 2018-03-26 NOTE — PT/OT/SLP PROGRESS
Occupational Therapy   Treatment    Name: Jhonny Diana  MRN: [id number]  Admitting Diagnosis:  Fever  17 Days Post-Op    Recommendations:     Discharge Recommendations: outpatient PT  Discharge Equipment Recommendations:  bath bench  Barriers to discharge:  None    Subjective     Communicated with: RN prior to session.  Pain/Comfort:  · Pain Rating 1: 0/10  · Pain Rating Post-Intervention 1: 0/10    Patients cultural, spiritual, Confucianist conflicts given the current situation: none stated     Objective:     Patient found with: telemetry    General Precautions: Standard, fall   Orthopedic Precautions:N/A   Braces: N/A     Occupational Performance:    Bed Mobility:    · Did not occur    Functional Mobility/Transfers:  · Patient completed Sit <> Stand Transfer with supervision  with  straight cane   · Functional Mobility: Pt ambulated community distance with Supervision and SPC for enhanced balance - no signs of LOB or SOB noted.     Activities of Daily Living:  · Grooming: modified independence brushing teeth standing at the sink     Patient left up in chair with all lines intact, call button in reach and mom present    AMPA 6 Click: Self-care  AMPA Total Score: 21    Treatment & Education:  Pt educated on:   - OT POC  - Importance of OOB activity to maximize recovery and increase activity tolerance   Education:  - Safety with functional mobility   - Pt able to reach and grasp 3/3 grooming items from above shoulder height - pt did well and displayed no signs of LOB      Assessment:     Jhonny Diana is a 27 y.o. male with a medical diagnosis of Fever.  He presents with good motivation and participation with therapy. He reported feeling fatigued throughout session but able to tolerate all functional activities well. He continues to demonstrate good endurance but reports if he ambulates too far (to cafeteria for icee) he needs the w/c for mobility back to room. Pt demonstrates improved dynamic balance by reaching for 3/3  grooming items placed in different planes requiring both shoulder flexion and crossing midline. He continues to benefit from acute OT to address the below listed impairments:      Performance deficits affecting function are weakness, impaired endurance, impaired self care skills, impaired functional mobilty, gait instability, impaired cardiopulmonary response to activity.      Rehab Prognosis:  Good; patient would benefit from acute skilled OT services to address these deficits and reach maximum level of function.       Plan:     Patient to be seen 3 x/week to address the above listed problems via self-care/home management, therapeutic activities, therapeutic exercises  · Plan of Care Expires: 18  · Plan of Care Reviewed with: patient, mother    This Plan of care has been discussed with the patient who was involved in its development and understands and is in agreement with the identified goals and treatment plan    GOALS:    Occupational Therapy Goals        Problem: Occupational Therapy Goal    Goal Priority Disciplines Outcome Interventions   Occupational Therapy Goal     OT, PT/OT Ongoing (interventions implemented as appropriate)    Description:  Goals to be met by:  2 Weeks -- 3/24/2018; Remaining Goals extended 1 week -- 2018    Patient will increase functional independence with ADLs by performin. Supine to sit with Supervision.--met  2. Sit to Stand transfers with Supervision- met 3/21/18  3. Toilet transfer to toilet with Supervision- met 3/21/18  4. Grooming while standing with Supervision-met 3/21/18 (using sink/walker to stabilize  5. UE Dressing with Supervision.- met 3/12/18             Revised 3-21-18: UE dress with spvn in standing   6. LE Dressing with Minimal Assistance-met 3/12/18,             Revised 3/21/18: LE dress with spvn standing  Added goals on 3/21/18:  1)increase dynamic balance to grasp 5/5 ADL items from closet/above shoulder level without UE support with spvn -- in  progress                            Time Tracking:     OT Date of Treatment: 03/26/18  OT Start Time: 1330  OT Stop Time: 1354  OT Total Time (min): 24 min    Billable Minutes:Self Care/Home Management 12  Therapeutic Activity 12    Anne Pérez OT  3/26/2018

## 2018-03-26 NOTE — SUBJECTIVE & OBJECTIVE
Interval History:   UOP continues to improve to Torsemide daily (2.2L/24h) but remains with poor metabolic clearance.  BUN continues trend up daily, noted that patient Is on TPN.    24 hour CrCl completed on Friday and results are poor and patient will need continued dialysis for the time being.      Review of patient's allergies indicates:   Allergen Reactions    Bactrim [sulfamethoxazole-trimethoprim] Other (See Comments)     Mookie Trell Syndrome     Current Facility-Administered Medications   Medication Frequency    0.9%  NaCl infusion PRN    acetaminophen tablet 650 mg Q6H PRN    albuterol-ipratropium 2.5mg-0.5mg/3mL nebulizer solution 3 mL Q4H PRN    bisacodyl EC tablet 10 mg Daily    bisacodyl suppository 10 mg Daily PRN    butalbital-acetaminophen-caffeine -40 mg per tablet 1 tablet Q4H PRN    cycloSPORINE modified capsule 125 mg BID    dextrose 50% injection 12.5 g PRN    dextrose 50% injection 25 g PRN    diphenhydrAMINE-zinc acetate 1-0.1% cream TID PRN    docusate sodium capsule 100 mg BID    dronabinol capsule 2.5 mg QHS    epoetin maurisio injection 12,600 Units Every Mon, Wed, Fri    ergocalciferol capsule 50,000 Units Q7 Days    fat emulsion 20% infusion 250 mL Once    glucagon (human recombinant) injection 1 mg PRN    glucose chewable tablet 16 g PRN    glucose chewable tablet 24 g PRN    heparin (porcine) injection 1,000 Units PRN    heparin (porcine) injection 5,000 Units Q8H    levetiracetam oral soln Soln 500 mg BID    levothyroxine tablet 75 mcg Before breakfast    metoclopramide HCl 5 mg/5 mL solution 10 mg QID (AC & HS)    mycophenolate capsule 500 mg BID    naloxone 0.4 mg/mL injection 0.2 mg PRN    omeprazole capsule 40 mg QAM    ondansetron disintegrating tablet 8 mg Q6H PRN    ondansetron injection 4 mg Q6H PRN    oxyCODONE immediate release tablet 5 mg Q4H PRN    oxyCODONE immediate release tablet 5 mg Nightly PRN    polyethylene glycol packet 17 g BID     predniSONE tablet 15 mg Daily    prochlorperazine injection Soln 10 mg Q6H PRN    prochlorperazine tablet 5 mg TID PRN    psyllium husk (aspartame) 3.4 gram PwPk 1 packet BID    simethicone chewable tablet 80 mg TID PRN    sodium chloride 0.9% flush 3 mL PRN    sodium chloride 0.9% flush 3 mL PRN    torsemide tablet 50 mg Daily    TPN ADULT CENTRAL LINE CUSTOM Continuous    ursodiol oral suspension (conc: 60 mg/mL) 300 mg BID       Objective:     Vital Signs (Most Recent):  Temp: 98.9 °F (37.2 °C) (03/26/18 0811)  Pulse: 93 (03/26/18 0811)  Resp: 16 (03/26/18 0811)  BP: (!) 139/94 (03/26/18 0811)  SpO2: 96 % (03/26/18 0811)  O2 Device (Oxygen Therapy): room air (03/26/18 0811) Vital Signs (24h Range):  Temp:  [97.5 °F (36.4 °C)-98.9 °F (37.2 °C)] 98.9 °F (37.2 °C)  Pulse:  [] 93  Resp:  [15-18] 16  SpO2:  [95 %-97 %] 96 %  BP: (139-162)/(85-95) 139/94     Weight: 70.2 kg (154 lb 12.2 oz) (03/26/18 0615)  Body mass index is 24.99 kg/m².  Body surface area is 1.81 meters squared.    I/O last 3 completed shifts:  In: 3053.8 [P.O.:1030; I.V.:60]  Out: 3315 [Urine:3315]    Physical Exam   Constitutional: He is oriented to person, place, and time.   HENT:   Head: Atraumatic.   Mouth/Throat: No oropharyngeal exudate.   Mild facial edema   Eyes: No scleral icterus.   Cardiovascular: Normal rate and regular rhythm.    Pulmonary/Chest: Effort normal and breath sounds normal. No respiratory distress. He has no wheezes. He has no rales.   Abdominal: Soft. There is no tenderness. There is no rebound.   acites   Musculoskeletal: Edema: mild BLE edema.   Lymphadenopathy:     He has no cervical adenopathy.   Neurological: He is alert and oriented to person, place, and time.   Skin: He is not diaphoretic.       Significant Labs:  CBC:   Recent Labs  Lab 03/26/18  0500   WBC 5.54   RBC 2.50*   HGB 8.3*   HCT 25.6*   *   *   MCH 33.2*   MCHC 32.4     CMP:   Recent Labs  Lab 03/26/18  0500       CALCIUM 9.4   ALBUMIN 2.6*   PROT 5.9*      K 4.3   CO2 24   CL 99   *   CREATININE 4.7*   ALKPHOS 111   ALT 5*   AST 18   BILITOT 1.0

## 2018-03-26 NOTE — ASSESSMENT & PLAN NOTE
LAURA dialysisi dependant since 1016/2017  HD on TTS [facility] under the care of Dr. Peterson  On HD for: 2.5 mo since 10/16/2017  Duration of outpatient dialysis session - 3.5 hrs  EDW - TBD  Residual Renal Function - yes     24 hour CrCl completed on Friday with result of only 6 ml/min.  UOP is improvement with remains with poor metabolic clearance.    Plan:  Will plan for HD treatment tomorrow for metabolic clearance.  Ok from renal standpoint to continue current Torsemide dose for volume management.   for time being, will plan for twice weekly scheduled dialysis sessions.    Anemia of CKD  -continue VIDA.

## 2018-03-26 NOTE — NURSING
Pt aao x3. Vss. No acute distress. Mother at bedside. Pt ambulated in hallway with mother. Pt refusing sub q heparin. Pt has only had 3/4 an Icee all day. Pt encouraged to eat and drink per nurse and mother. Pt in good spirits. See assessment for full chart details. Will continue to monitor, assess and adjust care as needed.     SAMREEN Stack, notified that the pt's central line is not sutured in.

## 2018-03-27 LAB
ALBUMIN SERPL BCP-MCNC: 2.8 G/DL
ALP SERPL-CCNC: 114 U/L
ALT SERPL W/O P-5'-P-CCNC: 7 U/L
ANION GAP SERPL CALC-SCNC: 16 MMOL/L
ANISOCYTOSIS BLD QL SMEAR: SLIGHT
AST SERPL-CCNC: 18 U/L
BASOPHILS # BLD AUTO: ABNORMAL K/UL
BASOPHILS NFR BLD: 0 %
BILIRUB SERPL-MCNC: 1 MG/DL
BUN SERPL-MCNC: 132 MG/DL
CALCIUM SERPL-MCNC: 9.6 MG/DL
CHLORIDE SERPL-SCNC: 97 MMOL/L
CO2 SERPL-SCNC: 26 MMOL/L
CREAT SERPL-MCNC: 4.9 MG/DL
CYCLOSPORINE BLD LC/MS/MS-MCNC: 246 NG/ML
DIFFERENTIAL METHOD: ABNORMAL
EOSINOPHIL # BLD AUTO: ABNORMAL K/UL
EOSINOPHIL NFR BLD: 1 %
ERYTHROCYTE [DISTWIDTH] IN BLOOD BY AUTOMATED COUNT: 17.9 %
EST. GFR  (AFRICAN AMERICAN): 17.3 ML/MIN/1.73 M^2
EST. GFR  (NON AFRICAN AMERICAN): 14.9 ML/MIN/1.73 M^2
GLUCOSE SERPL-MCNC: 107 MG/DL
HCT VFR BLD AUTO: 25.3 %
HGB BLD-MCNC: 8.2 G/DL
HYPOCHROMIA BLD QL SMEAR: ABNORMAL
IMM GRANULOCYTES # BLD AUTO: ABNORMAL K/UL
IMM GRANULOCYTES NFR BLD AUTO: ABNORMAL %
LYMPHOCYTES # BLD AUTO: ABNORMAL K/UL
LYMPHOCYTES NFR BLD: 15 %
MAGNESIUM SERPL-MCNC: 2.9 MG/DL
MCH RBC QN AUTO: 33.2 PG
MCHC RBC AUTO-ENTMCNC: 32.4 G/DL
MCV RBC AUTO: 102 FL
MONOCYTES # BLD AUTO: ABNORMAL K/UL
MONOCYTES NFR BLD: 9 %
NEUTROPHILS NFR BLD: 75 %
NRBC BLD-RTO: 0 /100 WBC
PHOSPHATE SERPL-MCNC: 6 MG/DL
PLATELET # BLD AUTO: 112 K/UL
PLATELET BLD QL SMEAR: ABNORMAL
PMV BLD AUTO: 12.2 FL
POTASSIUM SERPL-SCNC: 4.1 MMOL/L
PROT SERPL-MCNC: 6.3 G/DL
RBC # BLD AUTO: 2.47 M/UL
SODIUM SERPL-SCNC: 139 MMOL/L
WBC # BLD AUTO: 5.35 K/UL

## 2018-03-27 PROCEDURE — 85007 BL SMEAR W/DIFF WBC COUNT: CPT

## 2018-03-27 PROCEDURE — 80158 DRUG ASSAY CYCLOSPORINE: CPT

## 2018-03-27 PROCEDURE — 63600175 PHARM REV CODE 636 W HCPCS: Performed by: NURSE PRACTITIONER

## 2018-03-27 PROCEDURE — 25000242 PHARM REV CODE 250 ALT 637 W/ HCPCS: Performed by: NURSE PRACTITIONER

## 2018-03-27 PROCEDURE — 80053 COMPREHEN METABOLIC PANEL: CPT

## 2018-03-27 PROCEDURE — 25000003 PHARM REV CODE 250: Performed by: NURSE PRACTITIONER

## 2018-03-27 PROCEDURE — 99900035 HC TECH TIME PER 15 MIN (STAT)

## 2018-03-27 PROCEDURE — 25000003 PHARM REV CODE 250: Performed by: PHYSICIAN ASSISTANT

## 2018-03-27 PROCEDURE — 90935 HEMODIALYSIS ONE EVALUATION: CPT

## 2018-03-27 PROCEDURE — 94640 AIRWAY INHALATION TREATMENT: CPT

## 2018-03-27 PROCEDURE — 83735 ASSAY OF MAGNESIUM: CPT

## 2018-03-27 PROCEDURE — 84100 ASSAY OF PHOSPHORUS: CPT

## 2018-03-27 PROCEDURE — 90935 HEMODIALYSIS ONE EVALUATION: CPT | Mod: ,,, | Performed by: INTERNAL MEDICINE

## 2018-03-27 PROCEDURE — A4217 STERILE WATER/SALINE, 500 ML: HCPCS | Performed by: NURSE PRACTITIONER

## 2018-03-27 PROCEDURE — B4185 PARENTERAL SOL 10 GM LIPIDS: HCPCS | Performed by: NURSE PRACTITIONER

## 2018-03-27 PROCEDURE — 94664 DEMO&/EVAL PT USE INHALER: CPT

## 2018-03-27 PROCEDURE — 85027 COMPLETE CBC AUTOMATED: CPT

## 2018-03-27 PROCEDURE — 99233 SBSQ HOSP IP/OBS HIGH 50: CPT | Mod: ,,, | Performed by: NURSE PRACTITIONER

## 2018-03-27 PROCEDURE — 20600001 HC STEP DOWN PRIVATE ROOM

## 2018-03-27 RX ORDER — SODIUM CHLORIDE 9 MG/ML
INJECTION, SOLUTION INTRAVENOUS ONCE
Status: COMPLETED | OUTPATIENT
Start: 2018-03-27 | End: 2018-03-27

## 2018-03-27 RX ORDER — ALBUTEROL SULFATE 0.83 MG/ML
2.5 SOLUTION RESPIRATORY (INHALATION) ONCE AS NEEDED
Status: COMPLETED | OUTPATIENT
Start: 2018-03-27 | End: 2018-03-27

## 2018-03-27 RX ORDER — PENTAMIDINE ISETHIONATE 300 MG/300MG
300 INHALANT RESPIRATORY (INHALATION) ONCE
Status: COMPLETED | OUTPATIENT
Start: 2018-03-27 | End: 2018-03-27

## 2018-03-27 RX ADMIN — PENTAMIDINE ISETHIONATE 300 MG: 300 INHALANT RESPIRATORY (INHALATION) at 06:03

## 2018-03-27 RX ADMIN — URSODIOL 300 MG: 300 CAPSULE ORAL at 06:03

## 2018-03-27 RX ADMIN — ALBUTEROL SULFATE 2.5 MG: 2.5 SOLUTION RESPIRATORY (INHALATION) at 06:03

## 2018-03-27 RX ADMIN — TORSEMIDE 50 MG: 20 TABLET ORAL at 10:03

## 2018-03-27 RX ADMIN — CYCLOSPORINE 125 MG: 100 CAPSULE, LIQUID FILLED ORAL at 05:03

## 2018-03-27 RX ADMIN — MYCOPHENOLATE MOFETIL 500 MG: 250 CAPSULE ORAL at 06:03

## 2018-03-27 RX ADMIN — BISACODYL 10 MG: 5 TABLET, COATED ORAL at 10:03

## 2018-03-27 RX ADMIN — LEVOTHYROXINE SODIUM 75 MCG: 75 TABLET ORAL at 06:03

## 2018-03-27 RX ADMIN — HEPARIN SODIUM 1000 UNITS: 1000 INJECTION, SOLUTION INTRAVENOUS; SUBCUTANEOUS at 09:03

## 2018-03-27 RX ADMIN — OXYCODONE HYDROCHLORIDE 5 MG: 5 TABLET ORAL at 01:03

## 2018-03-27 RX ADMIN — OXYCODONE HYDROCHLORIDE 5 MG: 5 TABLET ORAL at 06:03

## 2018-03-27 RX ADMIN — DOCUSATE SODIUM 100 MG: 100 CAPSULE, LIQUID FILLED ORAL at 09:03

## 2018-03-27 RX ADMIN — RETINOL, ERGOCALCIFEROL, .ALPHA.-TOCOPHEROL ACETATE, DL-, PHYTONADIONE, ASCORBIC ACID, NIACINAMIDE, RIBOFLAVIN 5-PHOSPHATE SODIUM, THIAMINE HYDROCHLORIDE, PYRIDOXINE HYDROCHLORIDE, DEXPANTHENOL, BIOTIN, FOLIC ACID, AND CYANOCOBALAMIN: KIT at 11:03

## 2018-03-27 RX ADMIN — OXYCODONE HYDROCHLORIDE 5 MG: 5 TABLET ORAL at 11:03

## 2018-03-27 RX ADMIN — SODIUM CHLORIDE 200 ML: 0.9 INJECTION, SOLUTION INTRAVENOUS at 09:03

## 2018-03-27 RX ADMIN — MYCOPHENOLATE MOFETIL 500 MG: 250 CAPSULE ORAL at 09:03

## 2018-03-27 RX ADMIN — METOCLOPRAMIDE HYDROCHLORIDE 10 MG: 5 SOLUTION ORAL at 06:03

## 2018-03-27 RX ADMIN — LEVETIRACETAM 500 MG: 100 SOLUTION ORAL at 09:03

## 2018-03-27 RX ADMIN — URSODIOL 300 MG: 300 CAPSULE ORAL at 09:03

## 2018-03-27 RX ADMIN — METOCLOPRAMIDE HYDROCHLORIDE 10 MG: 5 SOLUTION ORAL at 10:03

## 2018-03-27 RX ADMIN — METOCLOPRAMIDE HYDROCHLORIDE 10 MG: 5 SOLUTION ORAL at 09:03

## 2018-03-27 RX ADMIN — CYCLOSPORINE 125 MG: 100 CAPSULE, LIQUID FILLED ORAL at 06:03

## 2018-03-27 RX ADMIN — PREDNISONE 15 MG: 5 TABLET ORAL at 10:03

## 2018-03-27 RX ADMIN — SOYBEAN OIL 250 ML: 20 INJECTION, SOLUTION INTRAVENOUS at 11:03

## 2018-03-27 RX ADMIN — DRONABINOL 2.5 MG: 2.5 CAPSULE ORAL at 09:03

## 2018-03-27 RX ADMIN — OXYCODONE HYDROCHLORIDE 5 MG: 5 TABLET ORAL at 02:03

## 2018-03-27 RX ADMIN — METOCLOPRAMIDE HYDROCHLORIDE 10 MG: 5 SOLUTION ORAL at 05:03

## 2018-03-27 RX ADMIN — LEVETIRACETAM 500 MG: 100 SOLUTION ORAL at 10:03

## 2018-03-27 NOTE — ASSESSMENT & PLAN NOTE
- see other cytomegaloviral disease.  - Pentam given 2/24. Plan to give next dose 3/27  - D/C Isavu 3/21.

## 2018-03-27 NOTE — PROGRESS NOTES
[facility]  Adult Nutrition  Progress Note    SUMMARY       Recommendations    Recommendation/Intervention: Recommend continuing nocturnal TPN regimen until patient is meeting 700kcal/day. Calorie count ongoing. Patient ate 0% of breakfast today, and ~10 peanut so far at visit. Drinking Icees a few times per week from cafeteria. Monitor and encourage intake. RD following.     Goals: 1. 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, complicate post-op w/ LAURA ongoing, chronic malnutrition, cirrhosis 2' EtOH  Interdisciplinary Rounds: attended  General Information Comments: nocturnal TPN plus IV lipids continues, but decreased 3/26 2' uremia; calorie count restarted today, encouraged patient to eat at last 300kcal per day with goal of 700 by the end of the week. still c/p nausea/vomiting and abd pain, requiring HD less but still 2x/week  Nutrition Discharge Planning: Adequate PO intake    Nutrition Risk Screen    Nutrition Risk Screen: large or nonhealing wound, burn or pressure ulcer    Nutrition/Diet History    Patient Reported Diet/Restrictions/Preferences: low salt  Typical Food/Fluid Intake: eats very little at this time  Food Preferences: No cultural or Anglican food preferences noted  Do you have any cultural, spiritual, Anglican conflicts, given your current situation?: none stated   (Retired) Meal/Snack Patterns: pt likes to cook, when feeling well will cook and then only eat bites  Supplemental Drinks or Food Habits:  (protein powders sometimes)  (RETIRED) Functional Status: able to prepare meals, able to purchase food, ambulatory  Factors Affecting Nutritional Intake: altered gastrointestinal function, abdominal distention, decreased  "appetite, nausea/vomiting    Anthropometrics    Temp: 98.4 °F (36.9 °C)  Height Method: Stated  Height: 5' 5.98" (167.6 cm)  Height (inches): 65.98 in  Weight Method: Bed Scale  Weight: 70.2 kg (154 lb 12.2 oz)  Weight (lb): 154.76 lb  Ideal Body Weight (IBW), Male: 141.88 lb  % Ideal Body Weight, Male (lb): 112.68 lb  BMI (Calculated): 25.9  BMI Grade: 25 - 29.9 - overweight  Weight Loss: unintentional  Usual Body Weight (UBW), k kg (2017 )  % Usual Body Weight: 86.71  % Weight Change From Usual Weight: -13.47 %    Anthropometrics Special Consideration         Lab/Procedures/Meds    (RETIRED) Diagnostic Test/Procedure Review: reviewed, pertinent  Pertinent Labs Reviewed: reviewed  Pertinent Labs Comments: , Cr 4.9, GFR 14.9, P 6, Mg 2.9  Pertinent Medications Reviewed: reviewed  Pertinent Medications Comments: bisacodyl, docusate, ergocalciferol, prednisone, marinol, psyllium    Physical Findings/Assessment    Overall Physical Appearance: generalized wasting, loss of muscle mass, loss of subcutaneous fat, weak  Tubes:  (-)  Oral/Mouth Cavity: WDL  Skin: intact    Estimated/Assessed Needs    Weight Used For Calorie Calculations: 70.2 kg (154 lb 12.2 oz)  Height: 5' 5.98" (167.6 cm)  Energy Calorie Requirements (kcal): 1489-1788 (30-35 kcal/kg)    Energy Need Method: Kcal/kg  20 kcal/kg (kcal): 1404  25 kcal/kg (kcal): 1755  30 kcal/kg (kcal): 2106  35 kcal/kg (kcal): 2457  40 kcal/kg (kcal): 2808  45 kcal/kg (kcal): 3159  50 kcal/kg (kcal): 3510  RMR (Milburn-St. Jeor Equation): 1614.5  Protein Requirements: 81-94g (1.3-1.5 g/kg)    Weight Used For Protein Calculations: 62.3 kg (137 lb 5.6 oz)    Fluid Requirements (mL): 1mL/kcal  Fluid Need Method: RDA Method  RDA Method (mL): 1869       Nutrition Prescription Ordered    Current Diet Order: Regular  Nutrition Order Comments: -  Current Nutrition Support Formula Ordered: Other (Comment) (custom TPN of 70g AA / 225 g dex plus IV lipids)  Current " Nutrition Support Rate Ordered: 72 (ml)  Current Nutrition Support Frequency Ordered: mL/hr x 12 hrs  Oral Nutrition Supplement: Boost Breeze    Evaluation of Received Nutrient/Fluid Intake    Enteral Calories (kcal): 0  Enteral Protein (gm): 0  Enteral (Free Water) Fluid (mL): 0  Parenteral Calories (kcal): 1045  Parenteral Protein (gm): 70  Parenteral Fluid (mL): 1728  Lipid Calories (kcals): 500 kcals  GIR (Glucose Infusion Rate) (mg/kg/min): 4.45 mg/kg/min  Total Calories (kcal): 0  Total Calories (kcal/kg): 1545  % Kcal Needs: 77  % Protein Needs: 77  I/O: -1L since admit, LBM 3/26  Energy Calories Required: meeting needs  Protein Required: meeting needs  Fluid Required: meeting needs  Comments: LBM 3/26  Tolerance: tolerating  % Intake of Estimated Energy Needs: 75 - 100 %  % Meal Intake: 0 - 25 %    Nutrition Risk    Level of Risk/Frequency of Follow-up:  (2x/week)     Assessment and Plan    Severe protein-calorie malnutrition    Nutrition Diagnosis  Inadequate oral intake    Related to (etiology):   Decreased appetite, nausea with eating    Signs and Symptoms (as evidenced by):   Pt eating 0%-10% of meals and relying on TPN nutrition for 100% of EPN, 75% EEN     Interventions/Recommendations (treatment strategy):  See recs    Nutrition Diagnosis Status:   Continues             Monitor and Evaluation    Food and Nutrient Intake: energy intake, food and beverage intake  Food and Nutrient Adminstration: diet order  Knowledge/Beliefs/Attitudes: food and nutrition knowledge/skill  Physical Activity and Function: nutrition-related ADLs and IADLs  Anthropometric Measurements: weight, weight change, body mass index  Biochemical Data, Medical Tests and Procedures: electrolyte and renal panel, lipid profile, gastrointestinal profile, glucose/endocrine profile, inflammatory profile  Nutrition-Focused Physical Findings: overall appearance     Nutrition Follow-Up    RD Follow-up?: Yes

## 2018-03-27 NOTE — PROGRESS NOTES
[facility]  Liver Transplant  Progress Note    Patient Name: Jhonny Diana  MRN: [id number]  Admission Date: 2018  Hospital Length of Stay: 75 days  Code Status: Full Code  Primary Care Provider: Primary Doctor No  Post-Operative Day: 159    ORGAN:   LIVER  Disease Etiology: Acute Alcoholic Hepatitis  Donor Type:    - Brain Death  CDC High Risk:   No  Donor CMV Status:   Donor CMV Status: Positive  Donor HBcAB:   Negative  Donor HCV Status:   Negative  Whole or Partial: Whole Liver  Biliary Anastomosis: End to End  Arterial Anatomy: Standard  Subjective:     History of Present Illness:  Jhonny Diana is a 26 y/o male with past medical history of alcoholic cirrhosis.  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 Found to have peritonitis in November (WBC 5000, 75% PNM) neg for bile leak. He was treated initially with vanc/cefepime. Repeat cell counts  with some improvement (WBC 1400, 45% PNM). He has undergone multiple paracenteses as well as abscess drainage of perihepatic fluid collections and treated with antimicrobial therapy but no positive cultures. Of note, biliary stricture also identified and ERCP performed on 2017 with sphincterotomy and biliary stent placed. Liver tests still have not normalized despite intervention, bilirubin and AP remain elevated. Other pertinent PMH current wound vac in place 2/2 wound infection, malnutrition requiring TPN for short course and ongoing hypoalbuminemia, and seizure activity while on prograf and has since been switched to cyclosporine without reoccurrence.  He presented to the ER for fever, abdominal pain, and N/V. He reports fever (103) for 1  day prior. Overnight, he developed N/V, reports small amount of green emesis with new left sided pain. He also endorses worsening SOB with exertion. He was scheduled as an outpatient for follow up paracentesis and IR drainage of fluid collection. CXR in ER shows large pleural effusion with subsegmental atelectasis. Infectious work up initiated in ER. His ANC is 900. Broad spectrum antibiotics initiated in ED. At admit, he denied chest pain, palpitations, diarrhea, constipation, back pain, or any sick contacts.    Hospital Course:  Unclear cause of ongoing fevers. Multiple infectious, diagnostic testing completed. Taken to OR for ex lap 2/6, loculated fluid collections found, cultures NGTD. Multiple blood cultures with NGTD. Thoracentesis 1/11, 1/18, 1/31, and 2/16, cultures NGTD. Paracentesis 1/11, 1/19, and 2/5, cultures NGTD. negative fungal markers/quant gold intermediate. Negative TTE.  IR drainage 1/11, cultures NGTD. Continued with intermittent fevers despite broad spectrum antibiotics that were initially started 1/11, then broadened antibiotics 2/22 from vanc/zosyn to ertapenem. Multiple CT scan of abd obtained that were unremarkable. Tagged WBCs scan 2/27 without source of infection found. He had several episodes of neutropenia requiring neupogen, last given 2/28. 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 Monitoring weekly CMV PCR. Chimerism studies obtained 3/1 to assess for GVHD, which was negative, EBV PCR negative, adenovirus negative. In addition to fevers, he has consistently c/o abdominal pain and N/V. Tbili has remain elevated post-op requiring multiple ERCPs. ERCPs performed this admission on 1/30, 2/19, and 3/9. ERCPs with sludge and stones with stents placed. Last ERCP 3/9 with metal stent placed into CBD and stones removed. His ertapenem was d/cd 3/7 due to AMS. Following ERCP 3/9, he was started on cefepime and flagyl  for possible cholangitis. He has remained afebrile since 3/10. He completed cefepime/flagyl after 3 weeks on 3/19 & 3/20. Due to elevated enzymes, he had a liver biopsy 1/23 consistent with cholestatic hepatitis (no cultures obtained during biopsy). GI was consulted for persistent N/V and malnutrition. EGD performed 1/25 and 3/6, EGD 3/6 with normal esophagus congested, erythematous and nodular mucosa in the stomach, one duodenal ulcer with a clean ulcer base (Emir Class III), biopsies taken, negative for CMV and GVHD. Due to N/V and abdominal pain, multiple attempts were made to place SHANE tube with tube feeding, but patient did not tolerate TF. Therefore, he was transitioned to TPN 1/16 then again on 2/24. He also has chronic constipation. Small bowel follow through study with normal findings. Following EGD 1/25, he was started on a PPI, miralax, and Metamucil, but he did not tolerate meds. For appetite stimulation he was trialed on marinol. He also did well with reglan before meals and at bedtime. Early post-op liver transplant surgery, he was switched to cyclosporine for seizures. During this admission, he was transitioned back to prograf 2/24 with keppra on board. Starting 3/1 he developed stuttering speech, disorientation. CT head 3/1 and MRI head 3/6 unremarkable. EEG 3/8 with no seizure activity. He started on rapamune and stress dose steroids 3/10, then ultimately returned to cyclosporine 3/13 and rapamune d/cd same day. Since transitioning back to cyclosporine, his mental status has remained stable. During admission, patient was evaluated by [facility] for future kidney transplant, and he was deemed to be a suitable candidate once medically stable and cleared. He dialyzes on a M-W-F schedule. He was anuric on admission, but starting producing urine around 3/16.  Now producing >2-3L per day.       Interval History: no acute events overnight. Slowly trying to improve caloric intake, goal 300-500 jalen/day, goal is  to wean TPN once 700 jalen/day. Continue nocturnal TPN/Lipids for now. Adjust TPN as now uremic.  Continue reglan and marinol. Last HD Monday 3/19 (MWF scheduled, held WF). Nephrology following. Although making adequate urine not clearing.  Plan for HD 3/27.  Torsemide 50 mg daily started.  He remains afebrile off antibiotics. No issues with restarted cellcept 500 mg bid. Monitor.     Scheduled Meds:   bisacodyl  10 mg Oral Daily    cycloSPORINE modified  125 mg Oral BID    docusate sodium  100 mg Oral BID    dronabinol  2.5 mg Oral QHS    epoetin maurisio (PROCRIT) injection  12,600 Units Intravenous Every Mon, Wed, Fri    ergocalciferol  50,000 Units Oral Q7 Days    fat emulsion 20%  250 mL Intravenous Once    heparin (porcine)  5,000 Units Subcutaneous Q8H    levetiracetam oral soln  500 mg Oral BID    levothyroxine  75 mcg Oral Before breakfast    metoclopramide HCl  10 mg Oral QID (AC & HS)    mycophenolate  500 mg Oral BID    omeprazole  40 mg Oral QAM    polyethylene glycol  17 g Oral BID    predniSONE  15 mg Oral Daily    psyllium husk (aspartame)  3.4 g Oral BID    torsemide  50 mg Oral Daily    ursodiol  300 mg Oral BID     Continuous Infusions:   TPN ADULT CENTRAL LINE CUSTOM       PRN Meds:sodium chloride 0.9%, acetaminophen, albuterol-ipratropium 2.5mg-0.5mg/3mL, bisacodyl, butalbital-acetaminophen-caffeine -40 mg, dextrose 50%, dextrose 50%, diphenhydrAMINE-zinc acetate 1-0.1%, glucagon (human recombinant), glucose, glucose, heparin (porcine), naloxone, ondansetron, ondansetron, oxyCODONE, oxyCODONE, prochlorperazine, prochlorperazine, simethicone, sodium chloride 0.9%, sodium chloride 0.9%    Review of Systems   Constitutional: Positive for activity change, appetite change and fatigue. Negative for chills and fever.   HENT: Negative for congestion and facial swelling.    Eyes: Negative for pain, discharge and visual disturbance.   Respiratory: Negative.  Negative for cough, chest  tightness, shortness of breath and wheezing.    Cardiovascular: Negative.  Negative for chest pain, palpitations and leg swelling.   Gastrointestinal: Positive for abdominal distention, abdominal pain and constipation. Negative for diarrhea, nausea and vomiting.   Endocrine: Negative.    Genitourinary: Negative for decreased urine volume, difficulty urinating, dysuria, hematuria and urgency.   Musculoskeletal: Negative for back pain, gait problem, neck pain and neck stiffness.   Skin: Negative for color change and pallor.   Allergic/Immunologic: Positive for immunocompromised state.   Neurological: Negative for dizziness, seizures, weakness, light-headedness and headaches.   Psychiatric/Behavioral: Positive for dysphoric mood. Negative for behavioral problems, confusion, hallucinations, sleep disturbance and suicidal ideas. The patient is not nervous/anxious.      Objective:     Vital Signs (Most Recent):  Temp: 98.4 °F (36.9 °C) (03/27/18 1109)  Pulse: 81 (03/27/18 1109)  Resp: 18 (03/27/18 1109)  BP: (!) 142/83 (03/27/18 1109)  SpO2: 98 % (03/27/18 1109) Vital Signs (24h Range):  Temp:  [97.6 °F (36.4 °C)-99 °F (37.2 °C)] 98.4 °F (36.9 °C)  Pulse:  [70-97] 81  Resp:  [16-20] 18  SpO2:  [93 %-99 %] 98 %  BP: (139-157)/(80-96) 142/83     Weight: 70.2 kg (154 lb 12.2 oz)  Body mass index is 24.99 kg/m².    Intake/Output - Last 3 Shifts       03/25 0700 - 03/26 0659 03/26 0700 - 03/27 0659 03/27 0700 - 03/28 0659    P.O. 360 480     I.V. (mL/kg)       Other   500    TPN 1232.5 649.6 371.2    Total Intake(mL/kg) 1592.5 (22.7) 1129.6 (16.1) 871.2 (12.4)    Urine (mL/kg/hr) 2180 (1.3) 3240 (1.9) 500 (1)    Emesis/NG output       Other   500 (1)    Stool 0 (0) 0 (0)     Blood       Total Output 2180 3240 1000    Net -587.5 -2110.4 -128.8           Stool Occurrence 2 x 2 x           Physical Exam   Constitutional: He is oriented to person, place, and time. He appears well-developed.   Hand and temporal muscle wasting    HENT:   Head: Normocephalic.   Eyes: Pupils are equal, round, and reactive to light. No scleral icterus.   Cardiovascular: Normal rate, regular rhythm, normal heart sounds and intact distal pulses.    Pulmonary/Chest: Effort normal and breath sounds normal. No respiratory distress. He has no wheezes. He has no rhonchi. He has no rales.   Abdominal: Soft. Bowel sounds are normal. He exhibits distension. There is tenderness in the right upper quadrant.   Musculoskeletal: Normal range of motion. He exhibits edema (generalized).   Neurological: He is alert and oriented to person, place, and time.   Skin: Skin is warm and dry.   Psychiatric: He has a normal mood and affect. His behavior is normal. Judgment and thought content normal.   Nursing note and vitals reviewed.      Laboratory:  Immunosuppressants         Stop Route Frequency     mycophenolate capsule 500 mg      -- Oral 2 times daily     cycloSPORINE modified capsule 125 mg      -- Oral 2 times daily     cycloSPORINE modified (NEORAL) 25 MG capsule      03/09 1714       cycloSPORINE modified (NEORAL) 100 MG capsule      03/09 1714       cycloSPORINE modified (NEORAL) 100 MG capsule      03/09 0514       cycloSPORINE modified (NEORAL) 25 MG capsule      03/09 0514          CBC:     Recent Labs  Lab 03/27/18  0500   WBC 5.35   RBC 2.47*   HGB 8.2*   HCT 25.3*   *   *   MCH 33.2*   MCHC 32.4     CMP:     Recent Labs  Lab 03/27/18  0500      CALCIUM 9.6   ALBUMIN 2.8*   PROT 6.3      K 4.1   CO2 26   CL 97   *   CREATININE 4.9*   ALKPHOS 114   ALT 7*   AST 18   BILITOT 1.0     Labs within the past 24 hours have been reviewed.    Diagnostic Results:  I have personally reviewed all pertinent imaging studies.    Assessment/Plan:     Severe protein-calorie malnutrition    - Poor PO intake since transplant requiring short course of TPN during previous hospital stay.   - Due to N/V and abdominal pain, multiple attempts were made to  place SHANE tube with tube feeding, but patient did not tolerate TF  - Transitioned to TPN 1/16 then again on 2/24 due to poor tolerance of SHANE tube/TF  - prealbumin 7 on 1/15 --> prealbumin 22 on 3/19, monitoring weekly (Mondays)  - EGD performed 1/25 and 3/6, gastroparesis, duodenal ulcer, and congested/erythematous gastric mucosa  - Small bowel through with normal findings.  - Consulted GI for GJ tube placement, but was canceled due to ongoing fevers  - Hold Reglan and Marinol 3/8 for neuro symptoms --> restarted reglan and marinol 3/19, will need to dc marinol before discharge because not covered by insurance.  - set goals for caloric intake with patient, will try nocturnal TPN started 3/20 PM to encourage appetite during the day  - Trying to set small caloric goals daily, for now goal is 300-500 jalen/day. Ultimately needs to consume 700 jalen/day to start weaning TPN.         Acute renal failure with tubular necrosis    - HD resumed on previous admission. Anuric on admission and dialyzes M-W-F.  - Nephrology following.    - Goal is for kidney txp in future- currently not a candidate due to health status.  - urine output has improved over last several days since 3/16, around 900ml uop daily.    - HD held 3/21 & 3/23, trialed lasix but patient refused lasix, prefers diamox  - 24 hour CrCl completed 3/23. Diamox that afternoon after collection completed.   - Nephrology ok with torsemide 50 mg daily. Started 3/24.  - BUN trending up, will plan for HD 3/27/18 to clear toxins per nephrology        Other cytomegaloviral diseases    - detected at 370 on CMV PCR 2/1.  - case discussed with ID and will hold off on treatment at this time given low WBC.   - undetected CMV PCR 2/8, 2/15, 2/22, 3/8   - continue Valcyte 200 mg every Mon, Wed, Fri.  - Per ID, valcyte dose decreased to ppx dose on 2/18--Valcyte d/c'd 2/25  - Repeat scope 3/6 to assess persistent N/V and possible CMV causing these symptoms. Biopsies thus far normal,  cmv stains negative.  - continue to monitor weekly CMV PCR's, last PCR neg from 3/15, recent level from 3/22 undetected.        Liver transplanted    - Post op course complicated by fevers and hyperbilirubinemia, AST/ALT stable.  - see biliary stricture of transplanted liver  - Liver biopsy 1/23 without rejection.  - Paracentesis 3/6 negative for infection, wbc 28 segs 3%.  - ERCP, EUS biopsy 3/9/18 - no rejection, mild cholestatic hepatitis seen.   - T bili improved to 1 today.        Constipation    - chronic  - encourage ambulation, stool softeners and laxatives, often refuses some meds 2/2 nausea, will often require suppository or brown bomb enema to have a BM.  - Small bowel follow through with normal findings.        Long-term use of immunosuppressant medication    - Maintenance IS with cyclosporine --> transitioned to Prograf 2/24 for continued nausea but did not tolerate from neuro standpoint.  PT IS NOT A CANDIDATE FOR TACROLIMUS.    - Transitioned back to cyclosporine 3/1  - D/c cyclosporine, started rapa & stress dose steroids 3/10 for continued confusion  - Mental status now improved, re-trial cyclosporine 3/13, d/c rapa 3/13  - Transition back to prednisone taper from stress dose steroids 3/15 --> will decrease by 5 mg q week.  - Restarted low dose MMF 3/23. Pt tolerating well.        Prophylactic immunotherapy    - See long term use of immunosuppression.         Anemia of chronic disease    - 1 U PRBC 3/12.  - Monitor daily labs. H/H stable, transfuse PRN.         Biliary stricture of transplanted liver    - ERCP 12/6 with post-anastomosis stricture with stent placement.  - Tbili with increase prompting ERCP 1/30 with stones and sludge, stents placed.  - Repeat ERCP 2/19 as was having recurrent fevers with bilirubin stuck at 2.0-2.2 with overall unremarkable findings seen. Did place new larger stents.   - ERCP/EUS 3/9 with metal stent placed in bile duct, stones removed.  - Tbili remains stable, 1  today. Continue actigall.        Seizure    - 3/1, patient with stuttering speech, trouble recalling specific words when speaking. Does have hx of seizure (on keppra) previously while on prograf. Switched to prograf over the weekend from cyclosporine. Neuro exam unremarkable.   - CT head without contrast obtained, unremarkable.   - In light of these symptoms, prograf d/c, restart cyclosporine 3/1.  - remains w/o seizure. Cont to have stutter/garbled speech/difficulty finding words.  Worse post EGD as sedation was given.  - MRI 3/6 unremarkable.  - EEG completed 3/8 - mild, generalized, non-specific cerebral dysfunction, no epileptiform activity.  - monitor closely. VSS.        At risk for opportunistic infections    - see other cytomegaloviral disease.  - Pentam given 2/24. Plan to give next dose 3/27  - D/C Isavu 3/21.        Delayed surgical wound healing    - Wd vac removed 1/12/18.    - Wound healing well.   - Changing dressing q Tuesday, last 3/20.        Vitamin D deficiency    - Continue ergo 50K weekly.         Nausea and vomiting    - h/o constipation, abdominal pain, and N/V  - worsened with SHANE tube and TF --> now on TPN  - encourage bowel regimen  - EGD performed 1/25 and 3/6, gastroparesis, duodenal ulcer, and congested/erythematous gastric mucosa  - Small bowel through with normal findings  - Continue reglan and marinol scheduled, anti-emetics PRN.   - If cont with GI symptoms then may need to consider Flex sig/ C-scope to further eval.             VTE Risk Mitigation         Ordered     heparin (porcine) injection 1,000 Units  As needed (PRN)     Route:  Intra-Catheter        03/12/18 1134     heparin (porcine) injection 5,000 Units  Every 8 hours     Route:  Subcutaneous        03/08/18 1622     Medium Risk of VTE  Once      01/11/18 0351     Place sequential compression device  Until discontinued      01/11/18 0351          The patients clinical status was discussed at multidisplinary rounds,  involving transplant surgery, transplant medicine, pharmacy, nursing, nutrition, and social work    Discharge Planning:  Monitor kidney function --> may need outpt HD  Monitor HH needs vs rehab for deconditioned status      Seda Camacho NP  Liver Transplant  [facility]

## 2018-03-27 NOTE — PROGRESS NOTES
Pt to HD via transport. Cellcept, cyclosporine, synthroid, ursodiol and reglan given. Torsemide and prednisone on pt cart in room, pt refused and stated he will take when he returns. TPN and lipids infusing to [facility].

## 2018-03-27 NOTE — PROGRESS NOTES
Dialysis completed. Right IJ tunneled catheter flushed with normal saline, heparinized, capped and taped. Patient dialyzed for 2 hours with no fluid removal per orders. Vitals signs stable.

## 2018-03-27 NOTE — PROCEDURES
"Jhonny Diana is a 27 y.o. male patient.    Temp: 98.4 °F (36.9 °C) (03/27/18 1109)  Pulse: 81 (03/27/18 1109)  Resp: 18 (03/27/18 1109)  BP: (!) 142/83 (03/27/18 1109)  SpO2: 98 % (03/27/18 1109)  Weight: 70.2 kg (154 lb 12.2 oz) (03/26/18 0615)  Height: 5' 5.98" (167.6 cm) (03/20/18 1525)       Central Line  Date/Time: 3/27/2018 1:36 PM  Location procedure was performed: [facility] TRANSPLANT STEPDOWN  Performed by: FILEMON CHEUNG  Pre-operative Diagnosis: Liver transplant  Post-operative diagnosis: Liver transplant   Consent Done: Yes  Time out: Immediately prior to procedure a "time out" was called to verify the correct patient, procedure, equipment, support staff and site/side marked as required.  Indications: TPN.  Description of findings: Left IJ TLC prepped and exchanged over a wire    Preparation: skin prepped with betadine  Skin prep agent dried: skin prep agent completely dried prior to procedure  Sterile barriers: all five maximum sterile barriers used - cap, mask, sterile gown, sterile gloves, and large sterile sheet  Hand hygiene: hand hygiene performed prior to central venous catheter insertion  Location details: left internal jugular  Catheter type: triple lumen  Catheter size: 7 Fr  Catheter Length: 16cm    Assessment: placement verified by x-ray and successful placement  Complications: none  Specimens: No  Implants: No  Post-procedure: line sutured,  chlorhexidine patch,  sterile dressing applied and blood return through all ports  Complications: No          Filemon Cheung  3/27/2018  "

## 2018-03-27 NOTE — PROGRESS NOTES
03/27/18 1222   Pre-Procedure Time-out   Procedure to be Performed central line change over guidewire   Site Marked by Whom Dr Jonatan Cheung at bedside to exchange LIJ TLC.  Sterile exchange of existing TLC done over guidewire.  1400: CXR done & per MD johnson to use line.

## 2018-03-27 NOTE — SUBJECTIVE & OBJECTIVE
Scheduled Meds:   bisacodyl  10 mg Oral Daily    cycloSPORINE modified  125 mg Oral BID    docusate sodium  100 mg Oral BID    dronabinol  2.5 mg Oral QHS    epoetin maurisio (PROCRIT) injection  12,600 Units Intravenous Every Mon, Wed, Fri    ergocalciferol  50,000 Units Oral Q7 Days    fat emulsion 20%  250 mL Intravenous Once    heparin (porcine)  5,000 Units Subcutaneous Q8H    levetiracetam oral soln  500 mg Oral BID    levothyroxine  75 mcg Oral Before breakfast    metoclopramide HCl  10 mg Oral QID (AC & HS)    mycophenolate  500 mg Oral BID    omeprazole  40 mg Oral QAM    polyethylene glycol  17 g Oral BID    predniSONE  15 mg Oral Daily    psyllium husk (aspartame)  3.4 g Oral BID    torsemide  50 mg Oral Daily    ursodiol  300 mg Oral BID     Continuous Infusions:   TPN ADULT CENTRAL LINE CUSTOM       PRN Meds:sodium chloride 0.9%, acetaminophen, albuterol-ipratropium 2.5mg-0.5mg/3mL, bisacodyl, butalbital-acetaminophen-caffeine -40 mg, dextrose 50%, dextrose 50%, diphenhydrAMINE-zinc acetate 1-0.1%, glucagon (human recombinant), glucose, glucose, heparin (porcine), naloxone, ondansetron, ondansetron, oxyCODONE, oxyCODONE, prochlorperazine, prochlorperazine, simethicone, sodium chloride 0.9%, sodium chloride 0.9%    Review of Systems   Constitutional: Positive for activity change, appetite change and fatigue. Negative for chills and fever.   HENT: Negative for congestion and facial swelling.    Eyes: Negative for pain, discharge and visual disturbance.   Respiratory: Negative.  Negative for cough, chest tightness, shortness of breath and wheezing.    Cardiovascular: Negative.  Negative for chest pain, palpitations and leg swelling.   Gastrointestinal: Positive for abdominal distention, abdominal pain and constipation. Negative for diarrhea, nausea and vomiting.   Endocrine: Negative.    Genitourinary: Negative for decreased urine volume, difficulty urinating, dysuria, hematuria and  urgency.   Musculoskeletal: Negative for back pain, gait problem, neck pain and neck stiffness.   Skin: Negative for color change and pallor.   Allergic/Immunologic: Positive for immunocompromised state.   Neurological: Negative for dizziness, seizures, weakness, light-headedness and headaches.   Psychiatric/Behavioral: Positive for dysphoric mood. Negative for behavioral problems, confusion, hallucinations, sleep disturbance and suicidal ideas. The patient is not nervous/anxious.      Objective:     Vital Signs (Most Recent):  Temp: 98.4 °F (36.9 °C) (03/27/18 1109)  Pulse: 81 (03/27/18 1109)  Resp: 18 (03/27/18 1109)  BP: (!) 142/83 (03/27/18 1109)  SpO2: 98 % (03/27/18 1109) Vital Signs (24h Range):  Temp:  [97.6 °F (36.4 °C)-99 °F (37.2 °C)] 98.4 °F (36.9 °C)  Pulse:  [70-97] 81  Resp:  [16-20] 18  SpO2:  [93 %-99 %] 98 %  BP: (139-157)/(80-96) 142/83     Weight: 70.2 kg (154 lb 12.2 oz)  Body mass index is 24.99 kg/m².    Intake/Output - Last 3 Shifts       03/25 0700 - 03/26 0659 03/26 0700 - 03/27 0659 03/27 0700 - 03/28 0659    P.O. 360 480     I.V. (mL/kg)       Other   500    TPN 1232.5 649.6 371.2    Total Intake(mL/kg) 1592.5 (22.7) 1129.6 (16.1) 871.2 (12.4)    Urine (mL/kg/hr) 2180 (1.3) 3240 (1.9) 500 (1)    Emesis/NG output       Other   500 (1)    Stool 0 (0) 0 (0)     Blood       Total Output 2180 3240 1000    Net -587.5 -2110.4 -128.8           Stool Occurrence 2 x 2 x           Physical Exam   Constitutional: He is oriented to person, place, and time. He appears well-developed.   Hand and temporal muscle wasting   HENT:   Head: Normocephalic.   Eyes: Pupils are equal, round, and reactive to light. No scleral icterus.   Cardiovascular: Normal rate, regular rhythm, normal heart sounds and intact distal pulses.    Pulmonary/Chest: Effort normal and breath sounds normal. No respiratory distress. He has no wheezes. He has no rhonchi. He has no rales.   Abdominal: Soft. Bowel sounds are normal. He  exhibits distension. There is tenderness in the right upper quadrant.   Musculoskeletal: Normal range of motion. He exhibits edema (generalized).   Neurological: He is alert and oriented to person, place, and time.   Skin: Skin is warm and dry.   Psychiatric: He has a normal mood and affect. His behavior is normal. Judgment and thought content normal.   Nursing note and vitals reviewed.      Laboratory:  Immunosuppressants         Stop Route Frequency     mycophenolate capsule 500 mg      -- Oral 2 times daily     cycloSPORINE modified capsule 125 mg      -- Oral 2 times daily     cycloSPORINE modified (NEORAL) 25 MG capsule      03/09 1714       cycloSPORINE modified (NEORAL) 100 MG capsule      03/09 1714       cycloSPORINE modified (NEORAL) 100 MG capsule      03/09 0514       cycloSPORINE modified (NEORAL) 25 MG capsule      03/09 0514          CBC:     Recent Labs  Lab 03/27/18  0500   WBC 5.35   RBC 2.47*   HGB 8.2*   HCT 25.3*   *   *   MCH 33.2*   MCHC 32.4     CMP:     Recent Labs  Lab 03/27/18  0500      CALCIUM 9.6   ALBUMIN 2.8*   PROT 6.3      K 4.1   CO2 26   CL 97   *   CREATININE 4.9*   ALKPHOS 114   ALT 7*   AST 18   BILITOT 1.0     Labs within the past 24 hours have been reviewed.    Diagnostic Results:  I have personally reviewed all pertinent imaging studies.

## 2018-03-27 NOTE — PLAN OF CARE
Problem: Patient Care Overview  Goal: Plan of Care Review  Outcome: Ongoing (interventions implemented as appropriate)  - So far has eaten 10 peanuts & 2/3 of 16 oz ICEE today, encourage nutrition & RECORD FOR CALORIE COUNT  - Monitor & record UOP  - HD done today, clearance with no volume removal  - TPN nightly with lipids  - LIJ TLC exchanged today  - Wound care done by WC RN  - Bed low & locked, call light in reach,  nonslip socks in use, calls for assistance  - Oxy 5 mg Q4H PRN with moderate results  - No c/o nausea today  - Monitor & record BM output (refuses stool softener, miralax and metamucil regularly)

## 2018-03-27 NOTE — PROGRESS NOTES
Follow up on transverse abdominal wound  Area of two small ulcerations have conjoined. Wound is pink and very superficial.     03/27/18 1400       Incision/Site 10/30/17 0911 abdomen transverse   Date First Assessed/Time First Assessed: 10/30/17 0911   Present Prior to Hospital Arrival?: Yes  Location: abdomen  Orientation: transverse   Incision WDL ex   Dressing Appearance Intact;Moist drainage   Drainage Amount Small   Drainage Characteristics/Odor Clear;Serous   Appearance Pink;Moist   Red (%), Wound Tissue Color 100 %   Periwound Area Scar tissue   Wound Edges Open   Wound Length (cm) 0.5   Wound Width (cm) 2   Depth (cm) 0.01   Care Cleansed with:;Sterile normal saline   Dressing Applied;Hydrocolloid     Continue hydrocolloid dressing to site.   Sween moisturizing cream applied to rest of transverse incision scar.  Zoë Murphy RN CWON  [id number]

## 2018-03-27 NOTE — PROGRESS NOTES
Patient received from floor with report from SISSY Hair. Maintenance dialysis began per orders via right IJ tunneled catheter.

## 2018-03-27 NOTE — ASSESSMENT & PLAN NOTE
- HD resumed on previous admission. Anuric on admission and dialyzes M-W-F.  - Nephrology following.    - Goal is for kidney txp in future- currently not a candidate due to health status.  - urine output has improved over last several days since 3/16, around 900ml uop daily.    - HD held 3/21 & 3/23, trialed lasix but patient refused lasix, prefers diamox  - 24 hour CrCl completed 3/23. Diamox that afternoon after collection completed.   - Nephrology ok with torsemide 50 mg daily. Started 3/24.  - BUN trending up, will plan for HD 3/27/18 to clear toxins per nephrology

## 2018-03-27 NOTE — PROGRESS NOTES
SW Update:    SW presented to pt's room for follow up and continuity of care. Pt presented as alert and oriented x 4 sitting in his recliner on his laptop and eating some snacks.  Pt was accompanied alone at time of SW visit; mother is at local apartment. Pt reports he is doing well but ready to discharge.  Pt states he has been ambulating in the halls and getting out of his room more each day. SW explained that per team, pt's nutrition must improve prior to discharge from the hospital. Pt states he believes his appetite and eating habits will improve once he is out of the hospital. SW explained pt needs to show progress while in the hospital to ensure a safe discharge. Pt states a calorie count has not been kept on him the past couple days. SW spoke with dietician Michelle to discuss. Michelle reports team should still be keeping a calorie count and she will ensure this. SW also encouraged pt to keep his own personal record of what he eats that way he can set goals each day to increase his intake further. Pt verbalizes his understanding. Overall, pt coping well despite long hospitalization. Pt does feel he is improving and headed in an upward direction which has been pertinent in his mental health and mood. No psychosocial issues or concerns noted. SW remains available for education, resources, support, and discharge planning as appropriate.

## 2018-03-28 LAB
ALBUMIN SERPL BCP-MCNC: 2.8 G/DL
ALP SERPL-CCNC: 118 U/L
ALT SERPL W/O P-5'-P-CCNC: 6 U/L
ANION GAP SERPL CALC-SCNC: 13 MMOL/L
ANISOCYTOSIS BLD QL SMEAR: SLIGHT
AST SERPL-CCNC: 17 U/L
BASOPHILS # BLD AUTO: ABNORMAL K/UL
BASOPHILS NFR BLD: 0 %
BILIRUB SERPL-MCNC: 1 MG/DL
BUN SERPL-MCNC: 106 MG/DL
CALCIUM SERPL-MCNC: 9.3 MG/DL
CHLORIDE SERPL-SCNC: 99 MMOL/L
CO2 SERPL-SCNC: 26 MMOL/L
CREAT SERPL-MCNC: 4 MG/DL
CYCLOSPORINE BLD LC/MS/MS-MCNC: 164 NG/ML
DIFFERENTIAL METHOD: ABNORMAL
EOSINOPHIL # BLD AUTO: ABNORMAL K/UL
EOSINOPHIL NFR BLD: 1 %
ERYTHROCYTE [DISTWIDTH] IN BLOOD BY AUTOMATED COUNT: 17.7 %
EST. GFR  (AFRICAN AMERICAN): 22.1 ML/MIN/1.73 M^2
EST. GFR  (NON AFRICAN AMERICAN): 19.1 ML/MIN/1.73 M^2
GLUCOSE SERPL-MCNC: 109 MG/DL
HCT VFR BLD AUTO: 24.3 %
HGB BLD-MCNC: 7.9 G/DL
HYPOCHROMIA BLD QL SMEAR: ABNORMAL
IMM GRANULOCYTES # BLD AUTO: ABNORMAL K/UL
IMM GRANULOCYTES NFR BLD AUTO: ABNORMAL %
LYMPHOCYTES # BLD AUTO: ABNORMAL K/UL
LYMPHOCYTES NFR BLD: 19 %
MAGNESIUM SERPL-MCNC: 2.4 MG/DL
MCH RBC QN AUTO: 33.2 PG
MCHC RBC AUTO-ENTMCNC: 32.5 G/DL
MCV RBC AUTO: 102 FL
MONOCYTES # BLD AUTO: ABNORMAL K/UL
MONOCYTES NFR BLD: 4 %
NEUTROPHILS NFR BLD: 76 %
NRBC BLD-RTO: 0 /100 WBC
OVALOCYTES BLD QL SMEAR: ABNORMAL
PATHOLOGIST INTERPRETATION AB/XM: NORMAL
PHOSPHATE SERPL-MCNC: 5.7 MG/DL
PLATELET # BLD AUTO: 90 K/UL
PMV BLD AUTO: 11.7 FL
POIKILOCYTOSIS BLD QL SMEAR: SLIGHT
POLYCHROMASIA BLD QL SMEAR: ABNORMAL
POTASSIUM SERPL-SCNC: 3.7 MMOL/L
PROT SERPL-MCNC: 6 G/DL
RBC # BLD AUTO: 2.38 M/UL
SODIUM SERPL-SCNC: 138 MMOL/L
WBC # BLD AUTO: 4.63 K/UL

## 2018-03-28 PROCEDURE — 97530 THERAPEUTIC ACTIVITIES: CPT

## 2018-03-28 PROCEDURE — 25000003 PHARM REV CODE 250: Performed by: PHYSICIAN ASSISTANT

## 2018-03-28 PROCEDURE — 25000003 PHARM REV CODE 250: Performed by: NURSE PRACTITIONER

## 2018-03-28 PROCEDURE — 94761 N-INVAS EAR/PLS OXIMETRY MLT: CPT

## 2018-03-28 PROCEDURE — 63600175 PHARM REV CODE 636 W HCPCS: Performed by: NURSE PRACTITIONER

## 2018-03-28 PROCEDURE — 80053 COMPREHEN METABOLIC PANEL: CPT

## 2018-03-28 PROCEDURE — 99233 SBSQ HOSP IP/OBS HIGH 50: CPT | Mod: ,,, | Performed by: PHYSICIAN ASSISTANT

## 2018-03-28 PROCEDURE — 84100 ASSAY OF PHOSPHORUS: CPT

## 2018-03-28 PROCEDURE — 20600001 HC STEP DOWN PRIVATE ROOM

## 2018-03-28 PROCEDURE — 94664 DEMO&/EVAL PT USE INHALER: CPT

## 2018-03-28 PROCEDURE — 85007 BL SMEAR W/DIFF WBC COUNT: CPT

## 2018-03-28 PROCEDURE — B4185 PARENTERAL SOL 10 GM LIPIDS: HCPCS | Performed by: PHYSICIAN ASSISTANT

## 2018-03-28 PROCEDURE — 63600175 PHARM REV CODE 636 W HCPCS: Performed by: PHYSICIAN ASSISTANT

## 2018-03-28 PROCEDURE — 83735 ASSAY OF MAGNESIUM: CPT

## 2018-03-28 PROCEDURE — A4217 STERILE WATER/SALINE, 500 ML: HCPCS | Performed by: PHYSICIAN ASSISTANT

## 2018-03-28 PROCEDURE — 85027 COMPLETE CBC AUTOMATED: CPT

## 2018-03-28 PROCEDURE — 80158 DRUG ASSAY CYCLOSPORINE: CPT

## 2018-03-28 RX ADMIN — CYCLOSPORINE 125 MG: 100 CAPSULE, LIQUID FILLED ORAL at 06:03

## 2018-03-28 RX ADMIN — OMEPRAZOLE 40 MG: 40 CAPSULE, DELAYED RELEASE ORAL at 08:03

## 2018-03-28 RX ADMIN — DRONABINOL 2.5 MG: 2.5 CAPSULE ORAL at 09:03

## 2018-03-28 RX ADMIN — METOCLOPRAMIDE HYDROCHLORIDE 10 MG: 5 SOLUTION ORAL at 05:03

## 2018-03-28 RX ADMIN — LEVETIRACETAM 500 MG: 100 SOLUTION ORAL at 09:03

## 2018-03-28 RX ADMIN — MYCOPHENOLATE MOFETIL 500 MG: 250 CAPSULE ORAL at 09:03

## 2018-03-28 RX ADMIN — ERYTHROPOIETIN 12600 UNITS: 20000 INJECTION, SOLUTION INTRAVENOUS; SUBCUTANEOUS at 06:03

## 2018-03-28 RX ADMIN — METOCLOPRAMIDE HYDROCHLORIDE 10 MG: 5 SOLUTION ORAL at 09:03

## 2018-03-28 RX ADMIN — OXYCODONE HYDROCHLORIDE 5 MG: 5 TABLET ORAL at 11:03

## 2018-03-28 RX ADMIN — METOCLOPRAMIDE HYDROCHLORIDE 10 MG: 5 SOLUTION ORAL at 06:03

## 2018-03-28 RX ADMIN — MYCOPHENOLATE MOFETIL 500 MG: 250 CAPSULE ORAL at 08:03

## 2018-03-28 RX ADMIN — LEVOTHYROXINE SODIUM 75 MCG: 75 TABLET ORAL at 05:03

## 2018-03-28 RX ADMIN — TORSEMIDE 50 MG: 20 TABLET ORAL at 08:03

## 2018-03-28 RX ADMIN — URSODIOL 300 MG: 300 CAPSULE ORAL at 08:03

## 2018-03-28 RX ADMIN — CYCLOSPORINE 125 MG: 100 CAPSULE, LIQUID FILLED ORAL at 08:03

## 2018-03-28 RX ADMIN — RETINOL, ERGOCALCIFEROL, .ALPHA.-TOCOPHEROL ACETATE, DL-, PHYTONADIONE, ASCORBIC ACID, NIACINAMIDE, RIBOFLAVIN 5-PHOSPHATE SODIUM, THIAMINE HYDROCHLORIDE, PYRIDOXINE HYDROCHLORIDE, DEXPANTHENOL, BIOTIN, FOLIC ACID, AND CYANOCOBALAMIN: KIT at 11:03

## 2018-03-28 RX ADMIN — SOYBEAN OIL 250 ML: 20 INJECTION, SOLUTION INTRAVENOUS at 09:03

## 2018-03-28 RX ADMIN — OXYCODONE HYDROCHLORIDE 5 MG: 5 TABLET ORAL at 03:03

## 2018-03-28 RX ADMIN — URSODIOL 300 MG: 300 CAPSULE ORAL at 09:03

## 2018-03-28 RX ADMIN — METOCLOPRAMIDE HYDROCHLORIDE 10 MG: 5 SOLUTION ORAL at 02:03

## 2018-03-28 RX ADMIN — LEVETIRACETAM 500 MG: 100 SOLUTION ORAL at 08:03

## 2018-03-28 RX ADMIN — OXYCODONE HYDROCHLORIDE 5 MG: 5 TABLET ORAL at 07:03

## 2018-03-28 RX ADMIN — PREDNISONE 15 MG: 5 TABLET ORAL at 08:03

## 2018-03-28 RX ADMIN — DOCUSATE SODIUM 100 MG: 100 CAPSULE, LIQUID FILLED ORAL at 09:03

## 2018-03-28 NOTE — ASSESSMENT & PLAN NOTE
- HD resumed on previous admission. Anuric on admission and dialyzes M-W-F.  - Nephrology following.    - Goal is for kidney txp in future- currently not a candidate due to health status.  - urine output has improved since 3/16 --> 2.6 L 3/27!  - HD held 3/21 & 3/23  - Nephrology started torsemide 50 mg daily 3/24- continue for now for volume management  - BUN elevated though on TPN, HD 3/27/18 --> will plan HD twice weekly for now

## 2018-03-28 NOTE — PROGRESS NOTES
[facility]  Liver Transplant  Progress Note    Patient Name: Jhonny Diana  MRN: [id number]  Admission Date: 2018  Hospital Length of Stay: 76 days  Code Status: Full Code  Primary Care Provider: Primary Doctor No  Post-Operative Day: 160    ORGAN:   LIVER  Disease Etiology: Acute Alcoholic Hepatitis  Donor Type:    - Brain Death  CDC High Risk:   No  Donor CMV Status:   Donor CMV Status: Positive  Donor HBcAB:   Negative  Donor HCV Status:   Negative  Whole or Partial: Whole Liver  Biliary Anastomosis: End to End  Arterial Anatomy: Standard  Subjective:     History of Present Illness:  Jhonny Diana is a 28 y/o male with past medical history of alcoholic cirrhosis.  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 Found to have peritonitis in November (WBC 5000, 75% PNM) neg for bile leak. He was treated initially with vanc/cefepime. Repeat cell counts  with some improvement (WBC 1400, 45% PNM). He has undergone multiple paracenteses as well as abscess drainage of perihepatic fluid collections and treated with antimicrobial therapy but no positive cultures. Of note, biliary stricture also identified and ERCP performed on 2017 with sphincterotomy and biliary stent placed. Liver tests still have not normalized despite intervention, bilirubin and AP remain elevated. Other pertinent PMH current wound vac in place 2/2 wound infection, malnutrition requiring TPN for short course and ongoing hypoalbuminemia, and seizure activity while on prograf and has since been switched to cyclosporine without reoccurrence.  He presented to the ER for fever, abdominal pain, and N/V. He reports fever (103) for 1  day prior. Overnight, he developed N/V, reports small amount of green emesis with new left sided pain. He also endorses worsening SOB with exertion. He was scheduled as an outpatient for follow up paracentesis and IR drainage of fluid collection. CXR in ER shows large pleural effusion with subsegmental atelectasis. Infectious work up initiated in ER. His ANC is 900. Broad spectrum antibiotics initiated in ED. At admit, he denied chest pain, palpitations, diarrhea, constipation, back pain, or any sick contacts.    Hospital Course:  Unclear cause of ongoing fevers. Multiple infectious, diagnostic testing completed. Taken to OR for ex lap 2/6, loculated fluid collections found, cultures NGTD. Multiple blood cultures with NGTD. Thoracentesis 1/11, 1/18, 1/31, and 2/16, cultures NGTD. Paracentesis 1/11, 1/19, and 2/5, cultures NGTD. negative fungal markers/quant gold intermediate. Negative TTE.  IR drainage 1/11, cultures NGTD. Continued with intermittent fevers despite broad spectrum antibiotics that were initially started 1/11, then broadened antibiotics 2/22 from vanc/zosyn to ertapenem. Multiple CT scan of abd obtained that were unremarkable. Tagged WBCs scan 2/27 without source of infection found. He had several episodes of neutropenia requiring neupogen, last given 2/28. 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 Monitoring weekly CMV PCR. Chimerism studies obtained 3/1 to assess for GVHD, which was negative, EBV PCR negative, adenovirus negative. In addition to fevers, he has consistently c/o abdominal pain and N/V. Tbili has remain elevated post-op requiring multiple ERCPs. ERCPs performed this admission on 1/30, 2/19, and 3/9. ERCPs with sludge and stones with stents placed. Last ERCP 3/9 with metal stent placed into CBD and stones removed. His ertapenem was d/cd 3/7 due to AMS. Following ERCP 3/9, he was started on cefepime and flagyl  for possible cholangitis. He has remained afebrile since 3/10. He completed cefepime/flagyl after 3 weeks on 3/19 & 3/20. Due to elevated enzymes, he had a liver biopsy 1/23 consistent with cholestatic hepatitis (no cultures obtained during biopsy). GI was consulted for persistent N/V and malnutrition. EGD performed 1/25 and 3/6, EGD 3/6 with normal esophagus congested, erythematous and nodular mucosa in the stomach, one duodenal ulcer with a clean ulcer base (Emir Class III), biopsies taken, negative for CMV and GVHD. Due to N/V and abdominal pain, multiple attempts were made to place SHANE tube with tube feeding, but patient did not tolerate TF. Therefore, he was transitioned to TPN 1/16 then again on 2/24. He also has chronic constipation. Small bowel follow through study with normal findings. Following EGD 1/25, he was started on a PPI, miralax, and Metamucil, but he did not tolerate meds. For appetite stimulation he was trialed on marinol. He also did well with reglan before meals and at bedtime. Early post-op liver transplant surgery, he was switched to cyclosporine for seizures. During this admission, he was transitioned back to prograf 2/24 with keppra on board. Starting 3/1 he developed stuttering speech, disorientation. CT head 3/1 and MRI head 3/6 unremarkable. EEG 3/8 with no seizure activity. He started on rapamune and stress dose steroids 3/10, then ultimately returned to cyclosporine 3/13 and rapamune d/cd same day. Since transitioning back to cyclosporine, his mental status has remained stable. During admission, patient was evaluated by [facility] for future kidney transplant, and he was deemed to be a suitable candidate once medically stable and cleared. He dialyzes on a M-W-F schedule. He was anuric on admission, but starting producing urine around 3/16.  Now producing >2-3L per day.       Interval History: No acute events overnight. Slowly trying to improve caloric intake, goal 300-500 jalen/day, goal is  to wean TPN once 700 jalen/day. Pt able to have full dinner last night- will likely halve TPN tomorrow if he continues to tolerate diet.  Continue nocturnal TPN/Lipids for now.  Continue reglan and marinol. Last HD Monday 3/27- plan to dialyze twice weekly until starts clearing. Continue Torsemide 50 mg daily for volume- pt made 2.6 L urine yesterday.  He remains afebrile off antibiotics. No issues with restart of Cellcept 500 mg bid. Monitor.     Scheduled Meds:   bisacodyl  10 mg Oral Daily    cycloSPORINE modified  125 mg Oral BID    docusate sodium  100 mg Oral BID    dronabinol  2.5 mg Oral QHS    epoetin maurisio (PROCRIT) injection  12,600 Units Intravenous Every Mon, Wed, Fri    ergocalciferol  50,000 Units Oral Q7 Days    fat emulsion 20%  250 mL Intravenous Once    heparin (porcine)  5,000 Units Subcutaneous Q8H    levetiracetam oral soln  500 mg Oral BID    levothyroxine  75 mcg Oral Before breakfast    metoclopramide HCl  10 mg Oral QID (AC & HS)    mycophenolate  500 mg Oral BID    omeprazole  40 mg Oral QAM    polyethylene glycol  17 g Oral BID    predniSONE  15 mg Oral Daily    psyllium husk (aspartame)  3.4 g Oral BID    torsemide  50 mg Oral Daily    ursodiol  300 mg Oral BID     Continuous Infusions:   TPN ADULT CENTRAL LINE CUSTOM       PRN Meds:sodium chloride 0.9%, acetaminophen, albuterol-ipratropium 2.5mg-0.5mg/3mL, bisacodyl, butalbital-acetaminophen-caffeine -40 mg, dextrose 50%, dextrose 50%, diphenhydrAMINE-zinc acetate 1-0.1%, glucagon (human recombinant), glucose, glucose, heparin (porcine), naloxone, ondansetron, ondansetron, oxyCODONE, oxyCODONE, prochlorperazine, prochlorperazine, simethicone, sodium chloride 0.9%, sodium chloride 0.9%    Review of Systems   Constitutional: Positive for activity change, appetite change and fatigue. Negative for chills and fever.   HENT: Negative for congestion and facial swelling.    Eyes: Negative for pain, discharge and visual  disturbance.   Respiratory: Negative.  Negative for cough, chest tightness, shortness of breath and wheezing.    Cardiovascular: Negative.  Negative for chest pain, palpitations and leg swelling.   Gastrointestinal: Positive for abdominal distention, abdominal pain and constipation. Negative for diarrhea, nausea and vomiting.   Endocrine: Negative.    Genitourinary: Negative for decreased urine volume, difficulty urinating, dysuria, hematuria and urgency.   Musculoskeletal: Negative for back pain, gait problem, neck pain and neck stiffness.   Skin: Negative for color change and pallor.   Allergic/Immunologic: Positive for immunocompromised state.   Neurological: Negative for dizziness, seizures, weakness, light-headedness and headaches.   Psychiatric/Behavioral: Negative for behavioral problems, confusion, dysphoric mood, hallucinations, sleep disturbance and suicidal ideas. The patient is not nervous/anxious.      Objective:     Vital Signs (Most Recent):  Temp: 98.8 °F (37.1 °C) (03/28/18 1152)  Pulse: 107 (03/28/18 1152)  Resp: 18 (03/28/18 1152)  BP: (!) 146/83 (03/28/18 1152)  SpO2: 95 % (03/28/18 1152) Vital Signs (24h Range):  Temp:  [98.3 °F (36.8 °C)-99 °F (37.2 °C)] 98.8 °F (37.1 °C)  Pulse:  [] 107  Resp:  [16-20] 18  SpO2:  [95 %-100 %] 95 %  BP: (129-154)/(78-89) 146/83     Weight: 70.2 kg (154 lb 12.2 oz)  Body mass index is 24.99 kg/m².    Intake/Output - Last 3 Shifts       03/26 0700 - 03/27 0659 03/27 0700 - 03/28 0659 03/28 0700 - 03/29 0659    P.O. 480 690 240    Other  500     .6 1056.7     Total Intake(mL/kg) 1129.6 (16.1) 2246.7 (32) 240 (3.4)    Urine (mL/kg/hr) 3240 (1.9) 2650 (1.6) 995 (2)    Other  500 (0.3)     Stool 0 (0) 0 (0)     Total Output 3240 3150 995    Net -2110.4 -903.3 -755           Stool Occurrence 2 x 1 x           Physical Exam   Constitutional: He is oriented to person, place, and time. He appears well-developed.   Hand and temporal muscle wasting   HENT:    Head: Normocephalic.   Eyes: Pupils are equal, round, and reactive to light. No scleral icterus.   Cardiovascular: Normal rate, regular rhythm, normal heart sounds and intact distal pulses.    Pulmonary/Chest: Effort normal and breath sounds normal. No respiratory distress. He has no wheezes. He has no rhonchi. He has no rales.   Abdominal: Soft. Bowel sounds are normal. He exhibits distension. There is no tenderness.   Musculoskeletal: Normal range of motion. He exhibits edema (generalized).   Neurological: He is alert and oriented to person, place, and time.   Skin: Skin is warm and dry.   Psychiatric: He has a normal mood and affect. His behavior is normal. Judgment and thought content normal.   Nursing note and vitals reviewed.      Laboratory:  Immunosuppressants         Stop Route Frequency     mycophenolate capsule 500 mg      -- Oral 2 times daily     cycloSPORINE modified capsule 125 mg      -- Oral 2 times daily     cycloSPORINE modified (NEORAL) 25 MG capsule      03/09 1714       cycloSPORINE modified (NEORAL) 100 MG capsule      03/09 1714       cycloSPORINE modified (NEORAL) 100 MG capsule      03/09 0514       cycloSPORINE modified (NEORAL) 25 MG capsule      03/09 0514          CBC:     Recent Labs  Lab 03/28/18  0500   WBC 4.63   RBC 2.38*   HGB 7.9*   HCT 24.3*   PLT 90*   *   MCH 33.2*   MCHC 32.5     CMP:     Recent Labs  Lab 03/28/18  0500      CALCIUM 9.3   ALBUMIN 2.8*   PROT 6.0      K 3.7   CO2 26   CL 99   *   CREATININE 4.0*   ALKPHOS 118   ALT 6*   AST 17   BILITOT 1.0     Labs within the past 24 hours have been reviewed.    Diagnostic Results:  I have personally reviewed all pertinent imaging studies.    Assessment/Plan:     Liver transplanted    - Post op course complicated by fevers and hyperbilirubinemia, AST/ALT stable.  - see biliary stricture of transplanted liver  - Liver biopsy 1/23 without rejection.  - Paracentesis 3/6 negative for infection, wbc 28  segs 3%.  - ERCP, EUS biopsy 3/9/18 - no rejection, mild cholestatic hepatitis seen.   - T bili improved to 1 today.        Long-term use of immunosuppressant medication    - Maintenance IS with cyclosporine --> transitioned to Prograf 2/24 for continued nausea but did not tolerate from neuro standpoint.  PT IS NOT A CANDIDATE FOR TACROLIMUS.    - Transitioned back to cyclosporine 3/1  - D/c cyclosporine, started rapa & stress dose steroids 3/10 for continued confusion  - Mental status now improved, re-trial cyclosporine 3/13, d/c rapa 3/13  - Transition back to prednisone taper from stress dose steroids 3/15 --> will decrease by 5 mg q week.  - Restarted low dose MMF 3/23. Pt tolerating well.        Prophylactic immunotherapy    - See long term use of immunosuppression.         At risk for opportunistic infections    - see other cytomegaloviral disease.  - Pentam given 2/24. Plan to give next dose 3/27  - D/C Isavu 3/21.        Vitamin D deficiency    - Continue ergo 50K weekly.         Other cytomegaloviral diseases    - detected at 370 on CMV PCR 2/1.  - case discussed with ID and will hold off on treatment at this time given low WBC.   - undetected CMV PCR 2/8, 2/15, 2/22, 3/8   - continue Valcyte 200 mg every Mon, Wed, Fri.  - Per ID, valcyte dose decreased to ppx dose on 2/18--Valcyte d/c'd 2/25  - Repeat scope 3/6 to assess persistent N/V and possible CMV causing these symptoms. Biopsies thus far normal, cmv stains negative.  - continue to monitor weekly CMV PCR's, last PCR neg from 3/15, recent level from 3/22 undetected.        Constipation    - chronic  - encourage ambulation, stool softeners and laxatives, often refuses some meds 2/2 nausea, will often require suppository or brown bomb enema to have a BM.  - Small bowel follow through with normal findings.        Severe protein-calorie malnutrition    - Poor PO intake since transplant requiring short course of TPN during previous hospital stay.   - Due to  N/V and abdominal pain, multiple attempts were made to place SHANE tube with tube feeding, but patient did not tolerate TF  - Transitioned to TPN 1/16 then again on 2/24 due to poor tolerance of SHANE tube/TF  - prealbumin 7 on 1/15 --> prealbumin 22 on 3/19, monitoring weekly (Mondays)  - EGD performed 1/25 and 3/6, gastroparesis, duodenal ulcer, and congested/erythematous gastric mucosa  - Small bowel through with normal findings.  - Consulted GI for GJ tube placement, but was canceled due to ongoing fevers  - Hold Reglan and Marinol 3/8 for neuro symptoms --> restarted reglan and marinol 3/19, will need to dc marinol before discharge because not covered by insurance.  - set goals for caloric intake with patient, continue nocturnal TPN started 3/20 PM to encourage appetite during the day  - Trying to set small caloric goals daily, for now goal is 300-500 jalen/day. Ultimately needs to consume 700 jalen/day to start weaning TPN.   - Can likely ween TPN to half 3/29        Nausea and vomiting    - h/o constipation, abdominal pain, and N/V  - worsened with SHANE tube and TF --> now on TPN  - encourage bowel regimen  - EGD performed 1/25 and 3/6, gastroparesis, duodenal ulcer, and congested/erythematous gastric mucosa  - Small bowel through with normal findings  - Continue reglan and marinol scheduled, anti-emetics PRN.   - If cont with GI symptoms then may need to consider Flex sig/ C-scope to further eval.         Biliary stricture of transplanted liver    - ERCP 12/6 with post-anastomosis stricture with stent placement.  - Tbili with increase prompting ERCP 1/30 with stones and sludge, stents placed.  - Repeat ERCP 2/19 as was having recurrent fevers with bilirubin stuck at 2.0-2.2 with overall unremarkable findings seen. Did place new larger stents.   - ERCP/EUS 3/9 with metal stent placed in bile duct, stones removed.  - Tbili remains stable, 1 today. Continue actigall.        Anemia of chronic disease    - 1 U PRBC  3/12.  - Monitor daily labs. H/H stable, transfuse PRN.         Delayed surgical wound healing    - Wd vac removed 1/12/18.    - Wound healing well.   - Changing dressing q Tuesday, last 3/27.        Seizure    - 3/1, patient with stuttering speech, trouble recalling specific words when speaking. Does have hx of seizure (on keppra) previously while on prograf. Switched to prograf over the weekend from cyclosporine. Neuro exam unremarkable.   - CT head without contrast obtained, unremarkable.   - In light of these symptoms, prograf d/c, restart cyclosporine 3/1.  - remains w/o seizure. Cont to have stutter/garbled speech/difficulty finding words.  Worse post EGD as sedation was given.  - MRI 3/6 unremarkable.  - EEG completed 3/8 - mild, generalized, non-specific cerebral dysfunction, no epileptiform activity.  - monitor closely. VSS.        Acute renal failure with tubular necrosis    - HD resumed on previous admission. Anuric on admission and dialyzes M-W-F.  - Nephrology following.    - Goal is for kidney txp in future- currently not a candidate due to health status.  - urine output has improved since 3/16 --> 2.6 L 3/27!  - HD held 3/21 & 3/23  - Nephrology started torsemide 50 mg daily 3/24- continue for now for volume management  - BUN elevated though on TPN, HD 3/27/18 --> will plan HD twice weekly for now            VTE Risk Mitigation         Ordered     heparin (porcine) injection 1,000 Units  As needed (PRN)     Route:  Intra-Catheter        03/12/18 1134     heparin (porcine) injection 5,000 Units  Every 8 hours     Route:  Subcutaneous        03/08/18 1622     Medium Risk of VTE  Once      01/11/18 0351     Place sequential compression device  Until discontinued      01/11/18 0351          The patients clinical status was discussed at multidisplinary rounds, involving transplant surgery, transplant medicine, pharmacy, nursing, nutrition, and social work    Discharge Planning:  Monitor kidney function  --> may need outpt HD  Monitor HH needs vs rehab for deconditioned status      Geronimo Vera PA-C  Liver Transplant  [facility]

## 2018-03-28 NOTE — PT/OT/SLP PROGRESS
Occupational Therapy   Treatment/Discharge    Name: Jhonny Diana  MRN: [id number]  Admitting Diagnosis:  Fever  19 Days Post-Op    Recommendations:     Discharge Recommendations: outpatient PT  Discharge Equipment Recommendations:  bath bench  Barriers to discharge:  None    Subjective     Communicated with: nsg prior to session.  Pain/Comfort:  · Pain Rating 1: 0/10    Patients cultural, spiritual, L[employer] day conflicts given the current situation: none    Objective:     Patient found with: central line    General Precautions: Standard, fall   Orthopedic Precautions:N/A   Braces:       Occupational Performance:    Bed Mobility:    · independent     Functional Mobility/Transfers:  · Mod I transfers  · Functional Mobility: mod I with ambulation with SPC x ~400 ft, no LOB    Activities of Daily Living:  · LE dress mod I  · toileting Mod I, no LOB reaching for objects    Patient left up on couch with mom present.    [facility] 6 Click:  [facility] Total Score: 23    Treatment & Education:  Pt performed dressing, toileting, ambulation on unit x ~400 ft with SPC; educated on POC and discharge from OT , educated on safety with activities related to balance (ie. Widening MADHU, stabilizing objects if needed, difference with dynamic balance activities related to ADL's) pt and mom verbalize understanding. Educated on con't to ambulate, perform OOB activities, OOB as much as tolerated.   Education:    Assessment:     Jhonny Diana is a 27 y.o. male with a medical diagnosis of Fever.  He presents with good effort and goals met.      Rehab Prognosis:  Pt has met acute OT goals.       Plan:   Goals met, discharge OT  · Plan of Care Expires: 04/09/18  · Plan of Care Reviewed with: patient, mother    This Plan of care has been discussed with the patient who was involved in its development and understands and is in agreement with the identified goals and treatment plan    GOALS:   Goals met 3/28/18.    Time Tracking:     OT Date of Treatment:  03/28/18  OT Start Time: 1200  OT Stop Time: 1216  OT Total Time (min): 16 min    Billable Minutes:Therapeutic Activity 16 min    Zulma Renteria OT  3/28/2018

## 2018-03-28 NOTE — ASSESSMENT & PLAN NOTE
- Poor PO intake since transplant requiring short course of TPN during previous hospital stay.   - Due to N/V and abdominal pain, multiple attempts were made to place SHANE tube with tube feeding, but patient did not tolerate TF  - Transitioned to TPN 1/16 then again on 2/24 due to poor tolerance of SHANE tube/TF  - prealbumin 7 on 1/15 --> prealbumin 22 on 3/19, monitoring weekly (Mondays)  - EGD performed 1/25 and 3/6, gastroparesis, duodenal ulcer, and congested/erythematous gastric mucosa  - Small bowel through with normal findings.  - Consulted GI for GJ tube placement, but was canceled due to ongoing fevers  - Hold Reglan and Marinol 3/8 for neuro symptoms --> restarted reglan and marinol 3/19, will need to dc marinol before discharge because not covered by insurance.  - set goals for caloric intake with patient, continue nocturnal TPN started 3/20 PM to encourage appetite during the day  - Trying to set small caloric goals daily, for now goal is 300-500 jalen/day. Ultimately needs to consume 700 jalen/day to start weaning TPN.   - Can likely ween TPN to half 3/29

## 2018-03-28 NOTE — SUBJECTIVE & OBJECTIVE
Scheduled Meds:   bisacodyl  10 mg Oral Daily    cycloSPORINE modified  125 mg Oral BID    docusate sodium  100 mg Oral BID    dronabinol  2.5 mg Oral QHS    epoetin maurisio (PROCRIT) injection  12,600 Units Intravenous Every Mon, Wed, Fri    ergocalciferol  50,000 Units Oral Q7 Days    fat emulsion 20%  250 mL Intravenous Once    heparin (porcine)  5,000 Units Subcutaneous Q8H    levetiracetam oral soln  500 mg Oral BID    levothyroxine  75 mcg Oral Before breakfast    metoclopramide HCl  10 mg Oral QID (AC & HS)    mycophenolate  500 mg Oral BID    omeprazole  40 mg Oral QAM    polyethylene glycol  17 g Oral BID    predniSONE  15 mg Oral Daily    psyllium husk (aspartame)  3.4 g Oral BID    torsemide  50 mg Oral Daily    ursodiol  300 mg Oral BID     Continuous Infusions:   TPN ADULT CENTRAL LINE CUSTOM       PRN Meds:sodium chloride 0.9%, acetaminophen, albuterol-ipratropium 2.5mg-0.5mg/3mL, bisacodyl, butalbital-acetaminophen-caffeine -40 mg, dextrose 50%, dextrose 50%, diphenhydrAMINE-zinc acetate 1-0.1%, glucagon (human recombinant), glucose, glucose, heparin (porcine), naloxone, ondansetron, ondansetron, oxyCODONE, oxyCODONE, prochlorperazine, prochlorperazine, simethicone, sodium chloride 0.9%, sodium chloride 0.9%    Review of Systems   Constitutional: Positive for activity change, appetite change and fatigue. Negative for chills and fever.   HENT: Negative for congestion and facial swelling.    Eyes: Negative for pain, discharge and visual disturbance.   Respiratory: Negative.  Negative for cough, chest tightness, shortness of breath and wheezing.    Cardiovascular: Negative.  Negative for chest pain, palpitations and leg swelling.   Gastrointestinal: Positive for abdominal distention, abdominal pain and constipation. Negative for diarrhea, nausea and vomiting.   Endocrine: Negative.    Genitourinary: Negative for decreased urine volume, difficulty urinating, dysuria, hematuria and  urgency.   Musculoskeletal: Negative for back pain, gait problem, neck pain and neck stiffness.   Skin: Negative for color change and pallor.   Allergic/Immunologic: Positive for immunocompromised state.   Neurological: Negative for dizziness, seizures, weakness, light-headedness and headaches.   Psychiatric/Behavioral: Negative for behavioral problems, confusion, dysphoric mood, hallucinations, sleep disturbance and suicidal ideas. The patient is not nervous/anxious.      Objective:     Vital Signs (Most Recent):  Temp: 98.8 °F (37.1 °C) (03/28/18 1152)  Pulse: 107 (03/28/18 1152)  Resp: 18 (03/28/18 1152)  BP: (!) 146/83 (03/28/18 1152)  SpO2: 95 % (03/28/18 1152) Vital Signs (24h Range):  Temp:  [98.3 °F (36.8 °C)-99 °F (37.2 °C)] 98.8 °F (37.1 °C)  Pulse:  [] 107  Resp:  [16-20] 18  SpO2:  [95 %-100 %] 95 %  BP: (129-154)/(78-89) 146/83     Weight: 70.2 kg (154 lb 12.2 oz)  Body mass index is 24.99 kg/m².    Intake/Output - Last 3 Shifts       03/26 0700 - 03/27 0659 03/27 0700 - 03/28 0659 03/28 0700 - 03/29 0659    P.O. 480 690 240    Other  500     .6 1056.7     Total Intake(mL/kg) 1129.6 (16.1) 2246.7 (32) 240 (3.4)    Urine (mL/kg/hr) 3240 (1.9) 2650 (1.6) 995 (2)    Other  500 (0.3)     Stool 0 (0) 0 (0)     Total Output 3240 3150 995    Net -2110.4 -903.3 -755           Stool Occurrence 2 x 1 x           Physical Exam   Constitutional: He is oriented to person, place, and time. He appears well-developed.   Hand and temporal muscle wasting   HENT:   Head: Normocephalic.   Eyes: Pupils are equal, round, and reactive to light. No scleral icterus.   Cardiovascular: Normal rate, regular rhythm, normal heart sounds and intact distal pulses.    Pulmonary/Chest: Effort normal and breath sounds normal. No respiratory distress. He has no wheezes. He has no rhonchi. He has no rales.   Abdominal: Soft. Bowel sounds are normal. He exhibits distension. There is no tenderness.   Musculoskeletal: Normal  range of motion. He exhibits edema (generalized).   Neurological: He is alert and oriented to person, place, and time.   Skin: Skin is warm and dry.   Psychiatric: He has a normal mood and affect. His behavior is normal. Judgment and thought content normal.   Nursing note and vitals reviewed.      Laboratory:  Immunosuppressants         Stop Route Frequency     mycophenolate capsule 500 mg      -- Oral 2 times daily     cycloSPORINE modified capsule 125 mg      -- Oral 2 times daily     cycloSPORINE modified (NEORAL) 25 MG capsule      03/09 1714       cycloSPORINE modified (NEORAL) 100 MG capsule      03/09 1714       cycloSPORINE modified (NEORAL) 100 MG capsule      03/09 0514       cycloSPORINE modified (NEORAL) 25 MG capsule      03/09 0514          CBC:     Recent Labs  Lab 03/28/18  0500   WBC 4.63   RBC 2.38*   HGB 7.9*   HCT 24.3*   PLT 90*   *   MCH 33.2*   MCHC 32.5     CMP:     Recent Labs  Lab 03/28/18  0500      CALCIUM 9.3   ALBUMIN 2.8*   PROT 6.0      K 3.7   CO2 26   CL 99   *   CREATININE 4.0*   ALKPHOS 118   ALT 6*   AST 17   BILITOT 1.0     Labs within the past 24 hours have been reviewed.    Diagnostic Results:  I have personally reviewed all pertinent imaging studies.

## 2018-03-28 NOTE — PLAN OF CARE
Pt is AAOx4; afebrile; vital signs stable. Pt stated that he did not sleep much last night, because he was up urinating all night. TPN/lipids run at night. Appetite is poor; calorie count in place. No c/o nausea, and some abdominal pain that was controlled with oxycodone. Mother at bedside, attentive to pt.

## 2018-03-28 NOTE — PLAN OF CARE
Problem: Occupational Therapy Goal  Goal: Occupational Therapy Goal  Goals to be met by:  2 Weeks -- 3/24/2018; Remaining Goals extended 1 week -- 2018    Patient will increase functional independence with ADLs by performin. Supine to sit with Supervision.--met  2. Sit to Stand transfers with Supervision- met 3/21/18  3. Toilet transfer to toilet with Supervision- met 3/21/18  4. Grooming while standing with Supervision-met 3/21/18 (using sink/walker to stabilize  5. UE Dressing with Supervision.- met 3/12/18             Revised 3-21-18: UE dress with spvn in standing   6. LE Dressing with Minimal Assistance-met 3/12/18,             Revised 3/21/18: LE dress with spvn standing  Added goals on 3/21/18:  1)increase dynamic balance to grasp 5/5 ADL items from closet/above shoulder level without UE support with spvn -- in progress           Outcome: Outcome(s) achieved Date Met: 18  OT goals met. No further acute OT goals at this time.  Zulma Renteria, OTR

## 2018-03-29 LAB
ALBUMIN SERPL BCP-MCNC: 2.9 G/DL
ALP SERPL-CCNC: 119 U/L
ALT SERPL W/O P-5'-P-CCNC: 7 U/L
ANION GAP SERPL CALC-SCNC: 16 MMOL/L
AST SERPL-CCNC: 20 U/L
BASOPHILS # BLD AUTO: 0.01 K/UL
BASOPHILS NFR BLD: 0.2 %
BILIRUB SERPL-MCNC: 1 MG/DL
BUN SERPL-MCNC: 116 MG/DL
CALCIUM SERPL-MCNC: 9.5 MG/DL
CHLORIDE SERPL-SCNC: 97 MMOL/L
CO2 SERPL-SCNC: 25 MMOL/L
CREAT SERPL-MCNC: 4.2 MG/DL
CYCLOSPORINE BLD LC/MS/MS-MCNC: 181 NG/ML
DIFFERENTIAL METHOD: ABNORMAL
EOSINOPHIL # BLD AUTO: 0 K/UL
EOSINOPHIL NFR BLD: 0.9 %
ERYTHROCYTE [DISTWIDTH] IN BLOOD BY AUTOMATED COUNT: 17.2 %
EST. GFR  (AFRICAN AMERICAN): 20.8 ML/MIN/1.73 M^2
EST. GFR  (NON AFRICAN AMERICAN): 18 ML/MIN/1.73 M^2
GLUCOSE SERPL-MCNC: 107 MG/DL
HCT VFR BLD AUTO: 24.4 %
HGB BLD-MCNC: 8.2 G/DL
IMM GRANULOCYTES # BLD AUTO: 0.08 K/UL
IMM GRANULOCYTES NFR BLD AUTO: 1.7 %
LYMPHOCYTES # BLD AUTO: 1.1 K/UL
LYMPHOCYTES NFR BLD: 23.1 %
MAGNESIUM SERPL-MCNC: 2.3 MG/DL
MCH RBC QN AUTO: 33.3 PG
MCHC RBC AUTO-ENTMCNC: 33.6 G/DL
MCV RBC AUTO: 99 FL
MONOCYTES # BLD AUTO: 0.7 K/UL
MONOCYTES NFR BLD: 14.9 %
NEUTROPHILS # BLD AUTO: 2.8 K/UL
NEUTROPHILS NFR BLD: 59.2 %
NRBC BLD-RTO: 0 /100 WBC
PHOSPHATE SERPL-MCNC: 6.1 MG/DL
PLATELET # BLD AUTO: 98 K/UL
PMV BLD AUTO: 11 FL
POTASSIUM SERPL-SCNC: 3.6 MMOL/L
PROT SERPL-MCNC: 6.4 G/DL
RBC # BLD AUTO: 2.46 M/UL
SODIUM SERPL-SCNC: 138 MMOL/L
WBC # BLD AUTO: 4.64 K/UL

## 2018-03-29 PROCEDURE — 25000003 PHARM REV CODE 250: Performed by: PHYSICIAN ASSISTANT

## 2018-03-29 PROCEDURE — A4217 STERILE WATER/SALINE, 500 ML: HCPCS | Performed by: PHYSICIAN ASSISTANT

## 2018-03-29 PROCEDURE — 80158 DRUG ASSAY CYCLOSPORINE: CPT

## 2018-03-29 PROCEDURE — 97116 GAIT TRAINING THERAPY: CPT

## 2018-03-29 PROCEDURE — 84100 ASSAY OF PHOSPHORUS: CPT

## 2018-03-29 PROCEDURE — 25000003 PHARM REV CODE 250: Performed by: NURSE PRACTITIONER

## 2018-03-29 PROCEDURE — 63600175 PHARM REV CODE 636 W HCPCS: Performed by: NURSE PRACTITIONER

## 2018-03-29 PROCEDURE — 63600175 PHARM REV CODE 636 W HCPCS: Performed by: PHYSICIAN ASSISTANT

## 2018-03-29 PROCEDURE — 99233 SBSQ HOSP IP/OBS HIGH 50: CPT | Mod: ,,, | Performed by: PHYSICIAN ASSISTANT

## 2018-03-29 PROCEDURE — 20600001 HC STEP DOWN PRIVATE ROOM

## 2018-03-29 PROCEDURE — 90935 HEMODIALYSIS ONE EVALUATION: CPT | Mod: ,,, | Performed by: INTERNAL MEDICINE

## 2018-03-29 PROCEDURE — 90935 HEMODIALYSIS ONE EVALUATION: CPT

## 2018-03-29 PROCEDURE — 85025 COMPLETE CBC W/AUTO DIFF WBC: CPT

## 2018-03-29 PROCEDURE — 83735 ASSAY OF MAGNESIUM: CPT

## 2018-03-29 PROCEDURE — 25000003 PHARM REV CODE 250: Performed by: INTERNAL MEDICINE

## 2018-03-29 PROCEDURE — 99900035 HC TECH TIME PER 15 MIN (STAT)

## 2018-03-29 PROCEDURE — 80053 COMPREHEN METABOLIC PANEL: CPT

## 2018-03-29 PROCEDURE — B4185 PARENTERAL SOL 10 GM LIPIDS: HCPCS | Performed by: PHYSICIAN ASSISTANT

## 2018-03-29 PROCEDURE — 94664 DEMO&/EVAL PT USE INHALER: CPT

## 2018-03-29 RX ORDER — SODIUM CHLORIDE 9 MG/ML
INJECTION, SOLUTION INTRAVENOUS ONCE
Status: COMPLETED | OUTPATIENT
Start: 2018-03-29 | End: 2018-03-29

## 2018-03-29 RX ADMIN — OXYCODONE HYDROCHLORIDE 5 MG: 5 TABLET ORAL at 01:03

## 2018-03-29 RX ADMIN — OXYCODONE HYDROCHLORIDE 5 MG: 5 TABLET ORAL at 07:03

## 2018-03-29 RX ADMIN — SODIUM CHLORIDE: 0.9 INJECTION, SOLUTION INTRAVENOUS at 08:03

## 2018-03-29 RX ADMIN — METOCLOPRAMIDE HYDROCHLORIDE 10 MG: 5 SOLUTION ORAL at 05:03

## 2018-03-29 RX ADMIN — MYCOPHENOLATE MOFETIL 500 MG: 250 CAPSULE ORAL at 08:03

## 2018-03-29 RX ADMIN — BUTALBITAL, ACETAMINOPHEN AND CAFFEINE 1 TABLET: 50; 325; 40 TABLET ORAL at 12:03

## 2018-03-29 RX ADMIN — LEVETIRACETAM 500 MG: 100 SOLUTION ORAL at 12:03

## 2018-03-29 RX ADMIN — LEVETIRACETAM 500 MG: 100 SOLUTION ORAL at 08:03

## 2018-03-29 RX ADMIN — OXYCODONE HYDROCHLORIDE 5 MG: 5 TABLET ORAL at 12:03

## 2018-03-29 RX ADMIN — DRONABINOL 2.5 MG: 2.5 CAPSULE ORAL at 08:03

## 2018-03-29 RX ADMIN — URSODIOL 300 MG: 300 CAPSULE ORAL at 07:03

## 2018-03-29 RX ADMIN — OXYCODONE HYDROCHLORIDE 5 MG: 5 TABLET ORAL at 09:03

## 2018-03-29 RX ADMIN — HEPARIN SODIUM 1000 UNITS: 1000 INJECTION, SOLUTION INTRAVENOUS; SUBCUTANEOUS at 11:03

## 2018-03-29 RX ADMIN — CYCLOSPORINE 125 MG: 100 CAPSULE, LIQUID FILLED ORAL at 05:03

## 2018-03-29 RX ADMIN — ERGOCALCIFEROL 50000 UNITS: 1.25 CAPSULE ORAL at 12:03

## 2018-03-29 RX ADMIN — METOCLOPRAMIDE HYDROCHLORIDE 10 MG: 5 SOLUTION ORAL at 12:03

## 2018-03-29 RX ADMIN — CYCLOSPORINE 125 MG: 100 CAPSULE, LIQUID FILLED ORAL at 07:03

## 2018-03-29 RX ADMIN — LEVOTHYROXINE SODIUM 75 MCG: 75 TABLET ORAL at 05:03

## 2018-03-29 RX ADMIN — TORSEMIDE 50 MG: 20 TABLET ORAL at 07:03

## 2018-03-29 RX ADMIN — METOCLOPRAMIDE HYDROCHLORIDE 10 MG: 5 SOLUTION ORAL at 09:03

## 2018-03-29 RX ADMIN — MYCOPHENOLATE MOFETIL 500 MG: 250 CAPSULE ORAL at 07:03

## 2018-03-29 RX ADMIN — URSODIOL 300 MG: 300 CAPSULE ORAL at 08:03

## 2018-03-29 RX ADMIN — OMEPRAZOLE 40 MG: 40 CAPSULE, DELAYED RELEASE ORAL at 07:03

## 2018-03-29 RX ADMIN — SOYBEAN OIL 250 ML: 20 INJECTION, SOLUTION INTRAVENOUS at 09:03

## 2018-03-29 RX ADMIN — RETINOL, ERGOCALCIFEROL, .ALPHA.-TOCOPHEROL ACETATE, DL-, PHYTONADIONE, ASCORBIC ACID, NIACINAMIDE, RIBOFLAVIN 5-PHOSPHATE SODIUM, THIAMINE HYDROCHLORIDE, PYRIDOXINE HYDROCHLORIDE, DEXPANTHENOL, BIOTIN, FOLIC ACID, AND CYANOCOBALAMIN: KIT at 09:03

## 2018-03-29 RX ADMIN — PREDNISONE 15 MG: 5 TABLET ORAL at 07:03

## 2018-03-29 NOTE — PT/OT/SLP PROGRESS
"Physical Therapy Treatment    Patient Name:  Jhonny Diana   MRN:  [id number]    Recommendations:     Discharge Recommendations:  outpatient PT   Discharge Equipment Recommendations: bath bench   Barriers to discharge: None    Assessment:     Jhonny Diana is a 27 y.o. male admitted with a medical diagnosis of Fever.  He presents with the following impairments/functional limitations:  impaired endurance, impaired cardiopulmonary response to activity, pain, weakness, gait instability, impaired balance.  Pt has shown improvements in balance and safety awareness.  Currently able to amb with cane with mod I.  Improvements in both 6MWT and 10x sit<>stand testing.  Recommendation for pt to receive skilled PT services in the outpatient setting upon discharge.  To maintain PT services during hospitalization to facilitate a return to [facility].      Recent Surgery: Procedure(s) (LRB):  ERCP (N/A)  ULTRASOUND-ENDOSCOPIC-UPPER (N/A) 20 Days Post-Op    Plan:     During this hospitalization, patient to be seen 2 x/week to address the above listed problems via gait training, therapeutic activities, therapeutic exercises, neuromuscular re-education  · Plan of Care Expires:  04/05/18   Plan of Care Reviewed with: patient, mother    Subjective     Communicated with nursing prior to session.  Patient found sitting in low recliner chair upon PT entry to room, agreeable to treatment.      "I've got a HA."    Chief Complaint: HA  Patient comments/goals: To go home  Pain/Comfort:  · Pain Rating 1: 7/10  · Location 1: abdomen  · Pain Addressed 1: Nurse notified, Pre-medicate for activity, Distraction    Patients cultural, spiritual, Sikh conflicts given the current situation: no    Objective:     Patient found with: central line     General Precautions: Standard, fall   Orthopedic Precautions:N/A   Braces: N/A     Functional Mobility:    · Transfers:     · Sit to Stand:  independence with no AD  · Bed to Chair: modified independence with  " straight cane  using  Stand Pivot    · Gait: Pt amb 850', Mod I, cane, with no episodes of LOB, no SOB noted    · Balance: Mod I: dynamic standing balance with cane      AM-PAC 6 CLICK MOBILITY  Turning over in bed (including adjusting bedclothes, sheets and blankets)?: 4  Sitting down on and standing up from a chair with arms (e.g., wheelchair, bedside commode, etc.): 4  Moving from lying on back to sitting on the side of the bed?: 4  Moving to and from a bed to a chair (including a wheelchair)?: 4  Need to walk in hospital room?: 4  Climbing 3-5 steps with a railing?: 3  Total Score: 23       Therapeutic Activities and Exercises:   Whiteboard updated  6MWT: Pt amb 630' = 192.02m, 4/10 RPE following  Gait speed: 0.53 m/s  10x sit<>stand: 45.79 sec, 6/10 RPE following    Patient left up in chair with all lines intact, call button in reach, nursing notified and mother present.    GOALS:    Physical Therapy Goals        Problem: Physical Therapy Goal    Goal Priority Disciplines Outcome Goal Variances Interventions   Physical Therapy Goal     PT/OT, PT Ongoing (interventions implemented as appropriate)     Description:  Goals to be met by: 18     Patient will increase functional independence with mobility by performin. Supine to sit with Modified [place]. - GOAL MET  2. Sit to stand transfer with Minimal Assistance. - GOAL MET  3. Bed to chair transfer with Contact Guard Assistance using Rolling Walker. - GOAL MET  4. Gait  x 150 feet with Contact Guard Assistance using Rolling Walker. - GOAL MET  5. Pt to perf 10x sit<>stand: 1 min 18 sec, to demonstrate improvements in B LE mm strength. - GOAL MET  6. Pt to perf 6MWT: amb 566', to demonstrate a clinically significant improvement in aerobic capacity.  - GOAL MET  7. Pt to amb 150' with SBA, RW. - GOAL MET    8. Sit to supine with Modified [place].  9. Lower extremity exercise program x 20 reps per handout, with assistance as needed.  10. Pt to  perf 10x sit<>stand: 44 sec, to demonstrate improvements in B LE mm strength.  11. Pt to perf 6MWT: amb 730', to demonstrate a clinically significant improvement in aerobic capacity.                           Time Tracking:     PT Received On: 03/29/18  PT Start Time: 1358     PT Stop Time: 1418  PT Total Time (min): 20 min     Billable Minutes: Gait Training 15    Treatment Type: Treatment  PT/PTA: PT     PTA Visit Number: 1     Tory Amato, PT  03/29/2018

## 2018-03-29 NOTE — PLAN OF CARE
Problem: Patient Care Overview  Goal: Plan of Care Review  Outcome: Ongoing (interventions implemented as appropriate)  -AAOx4  -HD 2 x weekly. Last HD was Monday. Will evaluate today if he needs to go. Pt put out 425mL urine thus far this shift.   -C/o abdominal pain moderately relieved with PRN oxy.   -Marinol and Reglan given to increase appetite. Pt ate a turkey sandwich over night. Added to calorie count.   -Nocturnal TPN and lipids continued @ 72mL/hr.  -Hand hygiene performed before and after care.   -See flow sheet for assessment details.     Problem: Fall Risk (Adult)  Goal: Identify Related Risk Factors and Signs and Symptoms  Related risk factors and signs and symptoms are identified upon initiation of Human Response Clinical Practice Guideline (CPG)   Outcome: Ongoing (interventions implemented as appropriate)  -Independent, mother @ bedside attentive to pt needs.   -Pt wears non-slip socks when ambulating. Ambulated in the hallway.   -Pt free from falls/injuries thus far this shift.   -Bed in low, locked position. Bed rails up x2. Call light within reach.

## 2018-03-29 NOTE — PLAN OF CARE
Pt is AAOx4; afebrile; vital signs stable. He went for HD this morning, with filtration only. Calorie count in place; he did not eat any breakfast or lunch and only a few bites for dinner. He was treated once with fioricet for a headache. He stated that he feels exhausted this afternoon. He gets tpn/lipids at night. Mother is at bedside, attentive to patient.

## 2018-03-29 NOTE — ASSESSMENT & PLAN NOTE
- HD resumed on previous admission. Anuric on admission and dialyzes M-W-F.  - Nephrology following.    - Goal is for kidney txp in future- currently not a candidate due to health status.  - urine output has improved since 3/16 --> 2.6 L 3/27!  - HD held 3/21 & 3/23  - Nephrology started torsemide 50 mg daily 3/24- continue for now for volume management  - BUN elevated though on TPN, HD 3/27/18 --> repeat today  - Will plan HD twice weekly for now

## 2018-03-29 NOTE — PLAN OF CARE
Problem: Physical Therapy Goal  Goal: Physical Therapy Goal  Goals to be met by: 18     Patient will increase functional independence with mobility by performin. Supine to sit with Modified [place]. - GOAL MET  2. Sit to stand transfer with Minimal Assistance. - GOAL MET  3. Bed to chair transfer with Contact Guard Assistance using Rolling Walker. - GOAL MET  4. Gait  x 150 feet with Contact Guard Assistance using Rolling Walker. - GOAL MET  5. Pt to perf 10x sit<>stand: 1 min 18 sec, to demonstrate improvements in B LE mm strength. - GOAL MET  6. Pt to perf 6MWT: amb 566', to demonstrate a clinically significant improvement in aerobic capacity.  - GOAL MET  7. Pt to amb 150' with SBA, RW. - GOAL MET    8. Sit to supine with Modified [place].  9. Lower extremity exercise program x 20 reps per handout, with assistance as needed.  10. Pt to perf 10x sit<>stand: 44 sec, to demonstrate improvements in B LE mm strength.  11. Pt to perf 6MWT: amb 730', to demonstrate a clinically significant improvement in aerobic capacity.         Outcome: Ongoing (interventions implemented as appropriate)  Pt achieved 7 goals, 2 goals modified to reflect CLOF.

## 2018-03-29 NOTE — PROGRESS NOTES
HD completed x 3 hrs with No UF removed. Pt tolerated procedure. Blood returned and CVC heplocked per protocol. Ports clamped and capped. Post VSS. Report called to primary RN Elvia Engle.

## 2018-03-29 NOTE — PROGRESS NOTES
[facility]  Liver Transplant  Progress Note    Patient Name: Jhonny Diana  MRN: [id number]  Admission Date: 2018  Hospital Length of Stay: 77 days  Code Status: Full Code  Primary Care Provider: Primary Doctor No  Post-Operative Day: 161    ORGAN:   LIVER  Disease Etiology: Acute Alcoholic Hepatitis  Donor Type:    - Brain Death  CDC High Risk:   No  Donor CMV Status:   Donor CMV Status: Positive  Donor HBcAB:   Negative  Donor HCV Status:   Negative  Whole or Partial: Whole Liver  Biliary Anastomosis: End to End  Arterial Anatomy: Standard  Subjective:     History of Present Illness:  Jhonny Diana is a 26 y/o male with past medical history of alcoholic cirrhosis.  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 Found to have peritonitis in November (WBC 5000, 75% PNM) neg for bile leak. He was treated initially with vanc/cefepime. Repeat cell counts  with some improvement (WBC 1400, 45% PNM). He has undergone multiple paracenteses as well as abscess drainage of perihepatic fluid collections and treated with antimicrobial therapy but no positive cultures. Of note, biliary stricture also identified and ERCP performed on 2017 with sphincterotomy and biliary stent placed. Liver tests still have not normalized despite intervention, bilirubin and AP remain elevated. Other pertinent PMH current wound vac in place 2/2 wound infection, malnutrition requiring TPN for short course and ongoing hypoalbuminemia, and seizure activity while on prograf and has since been switched to cyclosporine without reoccurrence.  He presented to the ER for fever, abdominal pain, and N/V. He reports fever (103) for 1  day prior. Overnight, he developed N/V, reports small amount of green emesis with new left sided pain. He also endorses worsening SOB with exertion. He was scheduled as an outpatient for follow up paracentesis and IR drainage of fluid collection. CXR in ER shows large pleural effusion with subsegmental atelectasis. Infectious work up initiated in ER. His ANC is 900. Broad spectrum antibiotics initiated in ED. At admit, he denied chest pain, palpitations, diarrhea, constipation, back pain, or any sick contacts.    Hospital Course:  Unclear cause of ongoing fevers. Multiple infectious, diagnostic testing completed. Taken to OR for ex lap 2/6, loculated fluid collections found, cultures NGTD. Multiple blood cultures with NGTD. Thoracentesis 1/11, 1/18, 1/31, and 2/16, cultures NGTD. Paracentesis 1/11, 1/19, and 2/5, cultures NGTD. negative fungal markers/quant gold intermediate. Negative TTE.  IR drainage 1/11, cultures NGTD. Continued with intermittent fevers despite broad spectrum antibiotics that were initially started 1/11, then broadened antibiotics 2/22 from vanc/zosyn to ertapenem. Multiple CT scan of abd obtained that were unremarkable. Tagged WBCs scan 2/27 without source of infection found. He had several episodes of neutropenia requiring neupogen, last given 2/28. 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 Monitoring weekly CMV PCR. Chimerism studies obtained 3/1 to assess for GVHD, which was negative, EBV PCR negative, adenovirus negative. In addition to fevers, he has consistently c/o abdominal pain and N/V. Tbili has remain elevated post-op requiring multiple ERCPs. ERCPs performed this admission on 1/30, 2/19, and 3/9. ERCPs with sludge and stones with stents placed. Last ERCP 3/9 with metal stent placed into CBD and stones removed. His ertapenem was d/cd 3/7 due to AMS. Following ERCP 3/9, he was started on cefepime and flagyl  for possible cholangitis. He has remained afebrile since 3/10. He completed cefepime/flagyl after 3 weeks on 3/19 & 3/20. Due to elevated enzymes, he had a liver biopsy 1/23 consistent with cholestatic hepatitis (no cultures obtained during biopsy). GI was consulted for persistent N/V and malnutrition. EGD performed 1/25 and 3/6, EGD 3/6 with normal esophagus congested, erythematous and nodular mucosa in the stomach, one duodenal ulcer with a clean ulcer base (Emir Class III), biopsies taken, negative for CMV and GVHD. Due to N/V and abdominal pain, multiple attempts were made to place SHANE tube with tube feeding, but patient did not tolerate TF. Therefore, he was transitioned to TPN 1/16 then again on 2/24. He also has chronic constipation. Small bowel follow through study with normal findings. Following EGD 1/25, he was started on a PPI, miralax, and Metamucil, but he did not tolerate meds. For appetite stimulation he was trialed on marinol. He also did well with reglan before meals and at bedtime. Early post-op liver transplant surgery, he was switched to cyclosporine for seizures. During this admission, he was transitioned back to prograf 2/24 with keppra on board. Starting 3/1 he developed stuttering speech, disorientation. CT head 3/1 and MRI head 3/6 unremarkable. EEG 3/8 with no seizure activity. He started on rapamune and stress dose steroids 3/10, then ultimately returned to cyclosporine 3/13 and rapamune d/cd same day. Since transitioning back to cyclosporine, his mental status has remained stable. During admission, patient was evaluated by [facility] for future kidney transplant, and he was deemed to be a suitable candidate once medically stable and cleared. He dialyzes on a M-W-F schedule. He was anuric on admission, but starting producing urine around 3/16.  Now producing >2-3L per day.       Interval History: No acute events overnight. Slowly trying to improve caloric intake, goal 300-500 jalen/day, goal is  to wean TPN once 700 jalen/day. Pt able to have full dinner 3/27 and 3/28 night- will likely halve TPN tomorrow if he continues to tolerate diet.  Continue nocturnal TPN/Lipids for now.  Continue reglan and marinol. Last HD Monday 3/27, dialyze today (plan is twice weekly until starts clearing). Continue Torsemide 50 mg daily for volume- pt made 2.8 L urine yesterday.  He remains afebrile off antibiotics. No issues with restart of Cellcept 500 mg BID. Monitor.     Scheduled Meds:   bisacodyl  10 mg Oral Daily    cycloSPORINE modified  125 mg Oral BID    docusate sodium  100 mg Oral BID    dronabinol  2.5 mg Oral QHS    epoetin maurisio (PROCRIT) injection  12,600 Units Intravenous Every Mon, Wed, Fri    ergocalciferol  50,000 Units Oral Q7 Days    fat emulsion 20%  250 mL Intravenous Once    heparin (porcine)  5,000 Units Subcutaneous Q8H    levetiracetam oral soln  500 mg Oral BID    levothyroxine  75 mcg Oral Before breakfast    metoclopramide HCl  10 mg Oral QID (AC & HS)    mycophenolate  500 mg Oral BID    omeprazole  40 mg Oral QAM    polyethylene glycol  17 g Oral BID    predniSONE  15 mg Oral Daily    psyllium husk (aspartame)  3.4 g Oral BID    torsemide  50 mg Oral Daily    ursodiol  300 mg Oral BID     Continuous Infusions:   TPN ADULT CENTRAL LINE CUSTOM       PRN Meds:sodium chloride 0.9%, acetaminophen, albuterol-ipratropium 2.5mg-0.5mg/3mL, bisacodyl, butalbital-acetaminophen-caffeine -40 mg, dextrose 50%, dextrose 50%, diphenhydrAMINE-zinc acetate 1-0.1%, glucagon (human recombinant), glucose, glucose, heparin (porcine), naloxone, ondansetron, ondansetron, oxyCODONE, oxyCODONE, prochlorperazine, prochlorperazine, simethicone, sodium chloride 0.9%, sodium chloride 0.9%    Review of Systems   Constitutional: Positive for activity change, appetite change and fatigue. Negative for chills and fever.   HENT: Negative for congestion and facial swelling.    Eyes: Negative for pain,  discharge and visual disturbance.   Respiratory: Negative.  Negative for cough, chest tightness, shortness of breath and wheezing.    Cardiovascular: Negative.  Negative for chest pain, palpitations and leg swelling.   Gastrointestinal: Positive for abdominal distention, abdominal pain and constipation. Negative for diarrhea, nausea and vomiting.   Endocrine: Negative.    Genitourinary: Negative for decreased urine volume, difficulty urinating, dysuria, hematuria and urgency.   Musculoskeletal: Negative for back pain, gait problem, neck pain and neck stiffness.   Skin: Negative for color change and pallor.   Allergic/Immunologic: Positive for immunocompromised state.   Neurological: Negative for dizziness, seizures, weakness, light-headedness and headaches.   Psychiatric/Behavioral: Negative for behavioral problems, confusion, dysphoric mood, hallucinations, sleep disturbance and suicidal ideas. The patient is not nervous/anxious.      Objective:     Vital Signs (Most Recent):  Temp: 98.7 °F (37.1 °C) (03/29/18 1158)  Pulse: 87 (03/29/18 1158)  Resp: 17 (03/29/18 1158)  BP: (!) 144/87 (03/29/18 1158)  SpO2: 96 % (03/29/18 1158) Vital Signs (24h Range):  Temp:  [98.2 °F (36.8 °C)-99 °F (37.2 °C)] 98.7 °F (37.1 °C)  Pulse:  [81-98] 87  Resp:  [17-20] 17  SpO2:  [96 %-99 %] 96 %  BP: (136-165)/(80-93) 144/87     Weight: 70.2 kg (154 lb 12.2 oz)  Body mass index is 24.99 kg/m².    Intake/Output - Last 3 Shifts       03/27 0700 - 03/28 0659 03/28 0700 - 03/29 0659 03/29 0700 - 03/30 0659    P.O. 690 1520 180    Other 500  600    TPN 1056.7 551.4     Total Intake(mL/kg) 2246.7 (32) 2071.4 (29.5) 780 (11.1)    Urine (mL/kg/hr) 2650 (1.6) 2895 (1.7) 1065 (2)    Emesis/NG output  0 (0)     Other 500 (0.3) 0 (0) 600 (1.1)    Stool 0 (0) 0 (0)     Blood  0 (0)     Total Output 3150 2895 1665    Net -903.3 -823.6 -885           Urine Occurrence  0 x     Stool Occurrence 1 x 0 x     Emesis Occurrence  0 x           Physical Exam    Constitutional: He is oriented to person, place, and time. He appears well-developed.   Hand and temporal muscle wasting   HENT:   Head: Normocephalic.   Eyes: Pupils are equal, round, and reactive to light. No scleral icterus.   Cardiovascular: Normal rate, regular rhythm, normal heart sounds and intact distal pulses.    Pulmonary/Chest: Effort normal and breath sounds normal. No respiratory distress. He has no wheezes. He has no rhonchi. He has no rales.   Abdominal: Soft. Bowel sounds are normal. He exhibits distension. There is no tenderness.   Musculoskeletal: Normal range of motion. He exhibits edema (generalized).   Neurological: He is alert and oriented to person, place, and time.   Skin: Skin is warm and dry.   Psychiatric: He has a normal mood and affect. His behavior is normal. Judgment and thought content normal.   Nursing note and vitals reviewed.      Laboratory:  Immunosuppressants         Stop Route Frequency     mycophenolate capsule 500 mg      -- Oral 2 times daily     cycloSPORINE modified capsule 125 mg      -- Oral 2 times daily     cycloSPORINE modified (NEORAL) 25 MG capsule      03/09 1714       cycloSPORINE modified (NEORAL) 100 MG capsule      03/09 1714       cycloSPORINE modified (NEORAL) 100 MG capsule      03/09 0514       cycloSPORINE modified (NEORAL) 25 MG capsule      03/09 0514          CBC:     Recent Labs  Lab 03/29/18  0500   WBC 4.64   RBC 2.46*   HGB 8.2*   HCT 24.4*   PLT 98*   MCV 99*   MCH 33.3*   MCHC 33.6     CMP:     Recent Labs  Lab 03/29/18  0500      CALCIUM 9.5   ALBUMIN 2.9*   PROT 6.4      K 3.6   CO2 25   CL 97   *   CREATININE 4.2*   ALKPHOS 119   ALT 7*   AST 20   BILITOT 1.0     Labs within the past 24 hours have been reviewed.    Diagnostic Results:  I have personally reviewed all pertinent imaging studies.    Assessment/Plan:     Liver transplanted    - Post op course complicated by fevers and hyperbilirubinemia, AST/ALT stable.  - see  biliary stricture of transplanted liver  - Liver biopsy 1/23 without rejection.  - Paracentesis 3/6 negative for infection, wbc 28 segs 3%.  - ERCP, EUS biopsy 3/9/18 - no rejection, mild cholestatic hepatitis seen.   - T bili improved to 1 today.        Long-term use of immunosuppressant medication    - Maintenance IS with cyclosporine --> transitioned to Prograf 2/24 for continued nausea but did not tolerate from neuro standpoint.  PT IS NOT A CANDIDATE FOR TACROLIMUS.    - Transitioned back to cyclosporine 3/1  - D/c cyclosporine, started rapa & stress dose steroids 3/10 for continued confusion  - Mental status now improved, re-trial cyclosporine 3/13, d/c rapa 3/13  - Transition back to prednisone taper from stress dose steroids 3/15 --> will decrease by 5 mg q week.  - Restarted low dose MMF 3/23. Pt tolerating well.        Prophylactic immunotherapy    - See long term use of immunosuppression.         At risk for opportunistic infections    - see other cytomegaloviral disease.  - Pentam given 2/24. Plan to give next dose 3/27  - D/C Isavu 3/21.        Vitamin D deficiency    - Continue ergo 50K weekly.         Other cytomegaloviral diseases    - detected at 370 on CMV PCR 2/1.  - case discussed with ID and will hold off on treatment at this time given low WBC.   - undetected CMV PCR 2/8, 2/15, 2/22, 3/8   - continue Valcyte 200 mg every Mon, Wed, Fri.  - Per ID, valcyte dose decreased to ppx dose on 2/18--Valcyte d/c'd 2/25  - Repeat scope 3/6 to assess persistent N/V and possible CMV causing these symptoms. Biopsies thus far normal, cmv stains negative.  - continue to monitor weekly CMV PCR's, last PCR neg from 3/15, recent level from 3/22 undetected.  - CMV 3/29 pending.        Constipation    - chronic  - encourage ambulation, stool softeners and laxatives, often refuses some meds 2/2 nausea, will often require suppository or brown bomb enema to have a BM.  - Small bowel follow through with normal  findings.        Severe protein-calorie malnutrition    - Poor PO intake since transplant requiring short course of TPN during previous hospital stay.   - Due to N/V and abdominal pain, multiple attempts were made to place SHANE tube with tube feeding, but patient did not tolerate TF  - Transitioned to TPN 1/16 then again on 2/24 due to poor tolerance of SHANE tube/TF  - prealbumin 7 on 1/15 --> prealbumin 22 on 3/19, monitoring weekly (Mondays)  - EGD performed 1/25 and 3/6, gastroparesis, duodenal ulcer, and congested/erythematous gastric mucosa  - Small bowel through with normal findings.  - Consulted GI for GJ tube placement, but was canceled due to ongoing fevers  - Hold Reglan and Marinol 3/8 for neuro symptoms --> restarted reglan and marinol 3/19, will need to dc marinol before discharge because not covered by insurance.  - set goals for caloric intake with patient, continue nocturnal TPN started 3/20 PM to encourage appetite during the day  - Trying to set small caloric goals daily, for now goal is 300-500 jalen/day. Ultimately needs to consume 700 jalen/day to start weaning TPN.   - Can likely ween TPN to half 3/30        Nausea and vomiting    - h/o constipation, abdominal pain, and N/V  - worsened with SHANE tube and TF --> now on TPN  - encourage bowel regimen  - EGD performed 1/25 and 3/6, gastroparesis, duodenal ulcer, and congested/erythematous gastric mucosa  - Small bowel through with normal findings  - Continue reglan and marinol scheduled, anti-emetics PRN.   - If cont with GI symptoms then may need to consider Flex sig/ C-scope to further eval.         Biliary stricture of transplanted liver    - ERCP 12/6 with post-anastomosis stricture with stent placement.  - Tbili with increase prompting ERCP 1/30 with stones and sludge, stents placed.  - Repeat ERCP 2/19 as was having recurrent fevers with bilirubin stuck at 2.0-2.2 with overall unremarkable findings seen. Did place new larger stents.   - ERCP/EUS  3/9 with metal stent placed in bile duct, stones removed.  - Tbili remains stable, 1 today. Continue actigall.        Anemia of chronic disease    - 1 U PRBC 3/12.  - Monitor daily labs. H/H stable, transfuse PRN.         Delayed surgical wound healing    - Wd vac removed 1/12/18.    - Wound healing well.   - Changing dressing q Tuesday, last 3/27.        Seizure    - 3/1, patient with stuttering speech, trouble recalling specific words when speaking. Does have hx of seizure (on keppra) previously while on prograf. Switched to prograf over the weekend from cyclosporine. Neuro exam unremarkable.   - CT head without contrast obtained, unremarkable.   - In light of these symptoms, prograf d/c, restart cyclosporine 3/1.  - remains w/o seizure. Cont to have stutter/garbled speech/difficulty finding words.  Worse post EGD as sedation was given.  - MRI 3/6 unremarkable.  - EEG completed 3/8 - mild, generalized, non-specific cerebral dysfunction, no epileptiform activity.  - monitor closely. VSS.        Acute renal failure with tubular necrosis    - HD resumed on previous admission. Anuric on admission and dialyzes M-W-F.  - Nephrology following.    - Goal is for kidney txp in future- currently not a candidate due to health status.  - urine output has improved since 3/16 --> 2.6 L 3/27!  - HD held 3/21 & 3/23  - Nephrology started torsemide 50 mg daily 3/24- continue for now for volume management  - BUN elevated though on TPN, HD 3/27/18 --> repeat today  - Will plan HD twice weekly for now            VTE Risk Mitigation         Ordered     heparin (porcine) injection 1,000 Units  As needed (PRN)     Route:  Intra-Catheter        03/12/18 1134     heparin (porcine) injection 5,000 Units  Every 8 hours     Route:  Subcutaneous        03/08/18 1622     Medium Risk of VTE  Once      01/11/18 0351     Place sequential compression device  Until discontinued      01/11/18 0351          The patients clinical status was discussed  at multidisplinary rounds, involving transplant surgery, transplant medicine, pharmacy, nursing, nutrition, and social work    Discharge Planning:  Monitor kidney function --> may need outpt HD  Monitor HH needs vs rehab for deconditioned status      Geronimo Vera PA-C  Liver Transplant  [facility]

## 2018-03-29 NOTE — ASSESSMENT & PLAN NOTE
- Poor PO intake since transplant requiring short course of TPN during previous hospital stay.   - Due to N/V and abdominal pain, multiple attempts were made to place SHANE tube with tube feeding, but patient did not tolerate TF  - Transitioned to TPN 1/16 then again on 2/24 due to poor tolerance of SHANE tube/TF  - prealbumin 7 on 1/15 --> prealbumin 22 on 3/19, monitoring weekly (Mondays)  - EGD performed 1/25 and 3/6, gastroparesis, duodenal ulcer, and congested/erythematous gastric mucosa  - Small bowel through with normal findings.  - Consulted GI for GJ tube placement, but was canceled due to ongoing fevers  - Hold Reglan and Marinol 3/8 for neuro symptoms --> restarted reglan and marinol 3/19, will need to dc marinol before discharge because not covered by insurance.  - set goals for caloric intake with patient, continue nocturnal TPN started 3/20 PM to encourage appetite during the day  - Trying to set small caloric goals daily, for now goal is 300-500 jalen/day. Ultimately needs to consume 700 jalen/day to start weaning TPN.   - Can likely ween TPN to half 3/30

## 2018-03-29 NOTE — PROGRESS NOTES
HD initiated via rt chest wall tunnel catheter, lines reversed due to poor aspirate from red port. Orders reviewed and machine settings verified.

## 2018-03-29 NOTE — PLAN OF CARE
Problem: Hemodialysis (Adult)  Intervention: Protect/Monitor Dialysis Access Site  Rt chest tunnel catheter accessed for HD

## 2018-03-29 NOTE — SUBJECTIVE & OBJECTIVE
Scheduled Meds:   bisacodyl  10 mg Oral Daily    cycloSPORINE modified  125 mg Oral BID    docusate sodium  100 mg Oral BID    dronabinol  2.5 mg Oral QHS    epoetin maurisio (PROCRIT) injection  12,600 Units Intravenous Every Mon, Wed, Fri    ergocalciferol  50,000 Units Oral Q7 Days    fat emulsion 20%  250 mL Intravenous Once    heparin (porcine)  5,000 Units Subcutaneous Q8H    levetiracetam oral soln  500 mg Oral BID    levothyroxine  75 mcg Oral Before breakfast    metoclopramide HCl  10 mg Oral QID (AC & HS)    mycophenolate  500 mg Oral BID    omeprazole  40 mg Oral QAM    polyethylene glycol  17 g Oral BID    predniSONE  15 mg Oral Daily    psyllium husk (aspartame)  3.4 g Oral BID    torsemide  50 mg Oral Daily    ursodiol  300 mg Oral BID     Continuous Infusions:   TPN ADULT CENTRAL LINE CUSTOM       PRN Meds:sodium chloride 0.9%, acetaminophen, albuterol-ipratropium 2.5mg-0.5mg/3mL, bisacodyl, butalbital-acetaminophen-caffeine -40 mg, dextrose 50%, dextrose 50%, diphenhydrAMINE-zinc acetate 1-0.1%, glucagon (human recombinant), glucose, glucose, heparin (porcine), naloxone, ondansetron, ondansetron, oxyCODONE, oxyCODONE, prochlorperazine, prochlorperazine, simethicone, sodium chloride 0.9%, sodium chloride 0.9%    Review of Systems   Constitutional: Positive for activity change, appetite change and fatigue. Negative for chills and fever.   HENT: Negative for congestion and facial swelling.    Eyes: Negative for pain, discharge and visual disturbance.   Respiratory: Negative.  Negative for cough, chest tightness, shortness of breath and wheezing.    Cardiovascular: Negative.  Negative for chest pain, palpitations and leg swelling.   Gastrointestinal: Positive for abdominal distention, abdominal pain and constipation. Negative for diarrhea, nausea and vomiting.   Endocrine: Negative.    Genitourinary: Negative for decreased urine volume, difficulty urinating, dysuria, hematuria and  urgency.   Musculoskeletal: Negative for back pain, gait problem, neck pain and neck stiffness.   Skin: Negative for color change and pallor.   Allergic/Immunologic: Positive for immunocompromised state.   Neurological: Negative for dizziness, seizures, weakness, light-headedness and headaches.   Psychiatric/Behavioral: Negative for behavioral problems, confusion, dysphoric mood, hallucinations, sleep disturbance and suicidal ideas. The patient is not nervous/anxious.      Objective:     Vital Signs (Most Recent):  Temp: 98.7 °F (37.1 °C) (03/29/18 1158)  Pulse: 87 (03/29/18 1158)  Resp: 17 (03/29/18 1158)  BP: (!) 144/87 (03/29/18 1158)  SpO2: 96 % (03/29/18 1158) Vital Signs (24h Range):  Temp:  [98.2 °F (36.8 °C)-99 °F (37.2 °C)] 98.7 °F (37.1 °C)  Pulse:  [81-98] 87  Resp:  [17-20] 17  SpO2:  [96 %-99 %] 96 %  BP: (136-165)/(80-93) 144/87     Weight: 70.2 kg (154 lb 12.2 oz)  Body mass index is 24.99 kg/m².    Intake/Output - Last 3 Shifts       03/27 0700 - 03/28 0659 03/28 0700 - 03/29 0659 03/29 0700 - 03/30 0659    P.O. 690 1520 180    Other 500  600    TPN 1056.7 551.4     Total Intake(mL/kg) 2246.7 (32) 2071.4 (29.5) 780 (11.1)    Urine (mL/kg/hr) 2650 (1.6) 2895 (1.7) 1065 (2)    Emesis/NG output  0 (0)     Other 500 (0.3) 0 (0) 600 (1.1)    Stool 0 (0) 0 (0)     Blood  0 (0)     Total Output 3150 2895 1665    Net -903.3 -823.6 -885           Urine Occurrence  0 x     Stool Occurrence 1 x 0 x     Emesis Occurrence  0 x           Physical Exam   Constitutional: He is oriented to person, place, and time. He appears well-developed.   Hand and temporal muscle wasting   HENT:   Head: Normocephalic.   Eyes: Pupils are equal, round, and reactive to light. No scleral icterus.   Cardiovascular: Normal rate, regular rhythm, normal heart sounds and intact distal pulses.    Pulmonary/Chest: Effort normal and breath sounds normal. No respiratory distress. He has no wheezes. He has no rhonchi. He has no rales.    Abdominal: Soft. Bowel sounds are normal. He exhibits distension. There is no tenderness.   Musculoskeletal: Normal range of motion. He exhibits edema (generalized).   Neurological: He is alert and oriented to person, place, and time.   Skin: Skin is warm and dry.   Psychiatric: He has a normal mood and affect. His behavior is normal. Judgment and thought content normal.   Nursing note and vitals reviewed.      Laboratory:  Immunosuppressants         Stop Route Frequency     mycophenolate capsule 500 mg      -- Oral 2 times daily     cycloSPORINE modified capsule 125 mg      -- Oral 2 times daily     cycloSPORINE modified (NEORAL) 25 MG capsule      03/09 1714       cycloSPORINE modified (NEORAL) 100 MG capsule      03/09 1714       cycloSPORINE modified (NEORAL) 100 MG capsule      03/09 0514       cycloSPORINE modified (NEORAL) 25 MG capsule      03/09 0514          CBC:     Recent Labs  Lab 03/29/18  0500   WBC 4.64   RBC 2.46*   HGB 8.2*   HCT 24.4*   PLT 98*   MCV 99*   MCH 33.3*   MCHC 33.6     CMP:     Recent Labs  Lab 03/29/18  0500      CALCIUM 9.5   ALBUMIN 2.9*   PROT 6.4      K 3.6   CO2 25   CL 97   *   CREATININE 4.2*   ALKPHOS 119   ALT 7*   AST 20   BILITOT 1.0     Labs within the past 24 hours have been reviewed.    Diagnostic Results:  I have personally reviewed all pertinent imaging studies.

## 2018-03-29 NOTE — ASSESSMENT & PLAN NOTE
- detected at 370 on CMV PCR 2/1.  - case discussed with ID and will hold off on treatment at this time given low WBC.   - undetected CMV PCR 2/8, 2/15, 2/22, 3/8   - continue Valcyte 200 mg every Mon, Wed, Fri.  - Per ID, valcyte dose decreased to ppx dose on 2/18--Valcyte d/c'd 2/25  - Repeat scope 3/6 to assess persistent N/V and possible CMV causing these symptoms. Biopsies thus far normal, cmv stains negative.  - continue to monitor weekly CMV PCR's, last PCR neg from 3/15, recent level from 3/22 undetected.  - CMV 3/29 pending.

## 2018-03-30 LAB
ALBUMIN SERPL BCP-MCNC: 2.8 G/DL
ALP SERPL-CCNC: 127 U/L
ALT SERPL W/O P-5'-P-CCNC: 8 U/L
ANION GAP SERPL CALC-SCNC: 12 MMOL/L
AST SERPL-CCNC: 20 U/L
BASOPHILS # BLD AUTO: 0.02 K/UL
BASOPHILS NFR BLD: 0.4 %
BILIRUB SERPL-MCNC: 1 MG/DL
BUN SERPL-MCNC: 55 MG/DL
CALCIUM SERPL-MCNC: 9 MG/DL
CHLORIDE SERPL-SCNC: 103 MMOL/L
CMV DNA SERPL NAA+PROBE-ACNC: NORMAL IU/ML
CO2 SERPL-SCNC: 25 MMOL/L
CREAT SERPL-MCNC: 2.6 MG/DL
CYCLOSPORINE BLD LC/MS/MS-MCNC: 177 NG/ML
DIFFERENTIAL METHOD: ABNORMAL
EOSINOPHIL # BLD AUTO: 0.1 K/UL
EOSINOPHIL NFR BLD: 1.5 %
ERYTHROCYTE [DISTWIDTH] IN BLOOD BY AUTOMATED COUNT: 17.2 %
EST. GFR  (AFRICAN AMERICAN): 37.1 ML/MIN/1.73 M^2
EST. GFR  (NON AFRICAN AMERICAN): 32.1 ML/MIN/1.73 M^2
GLUCOSE SERPL-MCNC: 107 MG/DL
HCT VFR BLD AUTO: 26.9 %
HGB BLD-MCNC: 8.5 G/DL
IMM GRANULOCYTES # BLD AUTO: 0.1 K/UL
IMM GRANULOCYTES NFR BLD AUTO: 2.2 %
LYMPHOCYTES # BLD AUTO: 1.1 K/UL
LYMPHOCYTES NFR BLD: 24.1 %
MAGNESIUM SERPL-MCNC: 2 MG/DL
MCH RBC QN AUTO: 32.4 PG
MCHC RBC AUTO-ENTMCNC: 31.6 G/DL
MCV RBC AUTO: 103 FL
MONOCYTES # BLD AUTO: 0.6 K/UL
MONOCYTES NFR BLD: 13.8 %
NEUTROPHILS # BLD AUTO: 2.7 K/UL
NEUTROPHILS NFR BLD: 58 %
NRBC BLD-RTO: 0 /100 WBC
PHOSPHATE SERPL-MCNC: 4.1 MG/DL
PLATELET # BLD AUTO: 89 K/UL
PMV BLD AUTO: 11.4 FL
POTASSIUM SERPL-SCNC: 3.7 MMOL/L
PROT SERPL-MCNC: 6.1 G/DL
RBC # BLD AUTO: 2.62 M/UL
SODIUM SERPL-SCNC: 140 MMOL/L
WBC # BLD AUTO: 4.57 K/UL

## 2018-03-30 PROCEDURE — 25000003 PHARM REV CODE 250: Performed by: NURSE PRACTITIONER

## 2018-03-30 PROCEDURE — 63600175 PHARM REV CODE 636 W HCPCS: Performed by: NURSE PRACTITIONER

## 2018-03-30 PROCEDURE — 20600001 HC STEP DOWN PRIVATE ROOM

## 2018-03-30 PROCEDURE — A4217 STERILE WATER/SALINE, 500 ML: HCPCS | Performed by: PHYSICIAN ASSISTANT

## 2018-03-30 PROCEDURE — 25000003 PHARM REV CODE 250

## 2018-03-30 PROCEDURE — 25000003 PHARM REV CODE 250: Performed by: PHYSICIAN ASSISTANT

## 2018-03-30 PROCEDURE — 84100 ASSAY OF PHOSPHORUS: CPT

## 2018-03-30 PROCEDURE — 80158 DRUG ASSAY CYCLOSPORINE: CPT

## 2018-03-30 PROCEDURE — 80053 COMPREHEN METABOLIC PANEL: CPT

## 2018-03-30 PROCEDURE — 63600175 PHARM REV CODE 636 W HCPCS

## 2018-03-30 PROCEDURE — 94761 N-INVAS EAR/PLS OXIMETRY MLT: CPT

## 2018-03-30 PROCEDURE — 99233 SBSQ HOSP IP/OBS HIGH 50: CPT | Mod: ,,, | Performed by: PHYSICIAN ASSISTANT

## 2018-03-30 PROCEDURE — 85025 COMPLETE CBC W/AUTO DIFF WBC: CPT

## 2018-03-30 PROCEDURE — 63600175 PHARM REV CODE 636 W HCPCS: Performed by: PHYSICIAN ASSISTANT

## 2018-03-30 PROCEDURE — 94664 DEMO&/EVAL PT USE INHALER: CPT

## 2018-03-30 PROCEDURE — 83735 ASSAY OF MAGNESIUM: CPT

## 2018-03-30 PROCEDURE — 99900035 HC TECH TIME PER 15 MIN (STAT)

## 2018-03-30 RX ORDER — OXYCODONE HYDROCHLORIDE 5 MG/1
5 TABLET ORAL ONCE
Status: COMPLETED | OUTPATIENT
Start: 2018-03-30 | End: 2018-03-30

## 2018-03-30 RX ORDER — FENTANYL CITRATE 50 UG/ML
50 INJECTION, SOLUTION INTRAMUSCULAR; INTRAVENOUS ONCE
Status: COMPLETED | OUTPATIENT
Start: 2018-03-30 | End: 2018-03-30

## 2018-03-30 RX ORDER — OXYCODONE HYDROCHLORIDE 5 MG/1
10 TABLET ORAL EVERY 4 HOURS PRN
Status: DISCONTINUED | OUTPATIENT
Start: 2018-03-30 | End: 2018-04-10 | Stop reason: HOSPADM

## 2018-03-30 RX ORDER — FENTANYL CITRATE 50 UG/ML
50 INJECTION, SOLUTION INTRAMUSCULAR; INTRAVENOUS
Status: DISCONTINUED | OUTPATIENT
Start: 2018-03-30 | End: 2018-03-31

## 2018-03-30 RX ADMIN — FENTANYL CITRATE 50 MCG: 50 INJECTION, SOLUTION INTRAMUSCULAR; INTRAVENOUS at 08:03

## 2018-03-30 RX ADMIN — OXYCODONE HYDROCHLORIDE 5 MG: 5 TABLET ORAL at 09:03

## 2018-03-30 RX ADMIN — ERYTHROPOIETIN 12600 UNITS: 20000 INJECTION, SOLUTION INTRAVENOUS; SUBCUTANEOUS at 12:03

## 2018-03-30 RX ADMIN — URSODIOL 300 MG: 300 CAPSULE ORAL at 09:03

## 2018-03-30 RX ADMIN — MYCOPHENOLATE MOFETIL 500 MG: 250 CAPSULE ORAL at 09:03

## 2018-03-30 RX ADMIN — PROCHLORPERAZINE EDISYLATE 10 MG: 5 INJECTION INTRAMUSCULAR; INTRAVENOUS at 09:03

## 2018-03-30 RX ADMIN — LEVETIRACETAM 500 MG: 100 SOLUTION ORAL at 09:03

## 2018-03-30 RX ADMIN — TORSEMIDE 50 MG: 20 TABLET ORAL at 09:03

## 2018-03-30 RX ADMIN — METOCLOPRAMIDE HYDROCHLORIDE 10 MG: 5 SOLUTION ORAL at 04:03

## 2018-03-30 RX ADMIN — OXYCODONE HYDROCHLORIDE 5 MG: 5 TABLET ORAL at 12:03

## 2018-03-30 RX ADMIN — OXYCODONE HYDROCHLORIDE 5 MG: 5 TABLET ORAL at 03:03

## 2018-03-30 RX ADMIN — METOCLOPRAMIDE HYDROCHLORIDE 10 MG: 5 SOLUTION ORAL at 05:03

## 2018-03-30 RX ADMIN — FENTANYL CITRATE 50 MCG: 50 INJECTION, SOLUTION INTRAMUSCULAR; INTRAVENOUS at 05:03

## 2018-03-30 RX ADMIN — DRONABINOL 2.5 MG: 2.5 CAPSULE ORAL at 09:03

## 2018-03-30 RX ADMIN — PREDNISONE 15 MG: 5 TABLET ORAL at 09:03

## 2018-03-30 RX ADMIN — OXYCODONE HYDROCHLORIDE 5 MG: 5 TABLET ORAL at 04:03

## 2018-03-30 RX ADMIN — CYCLOSPORINE 125 MG: 100 CAPSULE, LIQUID FILLED ORAL at 05:03

## 2018-03-30 RX ADMIN — BUTALBITAL, ACETAMINOPHEN AND CAFFEINE 1 TABLET: 50; 325; 40 TABLET ORAL at 07:03

## 2018-03-30 RX ADMIN — DOCUSATE SODIUM 100 MG: 100 CAPSULE, LIQUID FILLED ORAL at 09:03

## 2018-03-30 RX ADMIN — OMEPRAZOLE 40 MG: 40 CAPSULE, DELAYED RELEASE ORAL at 07:03

## 2018-03-30 RX ADMIN — BISACODYL 10 MG: 5 TABLET, COATED ORAL at 09:03

## 2018-03-30 RX ADMIN — CYCLOSPORINE 125 MG: 100 CAPSULE, LIQUID FILLED ORAL at 07:03

## 2018-03-30 RX ADMIN — OXYCODONE HYDROCHLORIDE 5 MG: 5 TABLET ORAL at 07:03

## 2018-03-30 RX ADMIN — METOCLOPRAMIDE HYDROCHLORIDE 10 MG: 5 SOLUTION ORAL at 11:03

## 2018-03-30 RX ADMIN — RETINOL, ERGOCALCIFEROL, .ALPHA.-TOCOPHEROL ACETATE, DL-, PHYTONADIONE, ASCORBIC ACID, NIACINAMIDE, RIBOFLAVIN 5-PHOSPHATE SODIUM, THIAMINE HYDROCHLORIDE, PYRIDOXINE HYDROCHLORIDE, DEXPANTHENOL, BIOTIN, FOLIC ACID, AND CYANOCOBALAMIN: KIT at 09:03

## 2018-03-30 RX ADMIN — LEVOTHYROXINE SODIUM 75 MCG: 75 TABLET ORAL at 04:03

## 2018-03-30 NOTE — PROGRESS NOTES
[facility]  Liver Transplant  Progress Note    Patient Name: Jhonny Diana  MRN: [id number]  Admission Date: 2018  Hospital Length of Stay: 78 days  Code Status: Full Code  Primary Care Provider: Primary Doctor No  Post-Operative Day: 162    ORGAN:   LIVER  Disease Etiology: Acute Alcoholic Hepatitis  Donor Type:    - Brain Death  CDC High Risk:   No  Donor CMV Status:   Donor CMV Status: Positive  Donor HBcAB:   Negative  Donor HCV Status:   Negative  Whole or Partial: Whole Liver  Biliary Anastomosis: End to End  Arterial Anatomy: Standard  Subjective:     History of Present Illness:  Jhonny Diana is a 28 y/o male with past medical history of alcoholic cirrhosis.  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 Found to have peritonitis in November (WBC 5000, 75% PNM) neg for bile leak. He was treated initially with vanc/cefepime. Repeat cell counts  with some improvement (WBC 1400, 45% PNM). He has undergone multiple paracenteses as well as abscess drainage of perihepatic fluid collections and treated with antimicrobial therapy but no positive cultures. Of note, biliary stricture also identified and ERCP performed on 2017 with sphincterotomy and biliary stent placed. Liver tests still have not normalized despite intervention, bilirubin and AP remain elevated. Other pertinent PMH current wound vac in place 2/2 wound infection, malnutrition requiring TPN for short course and ongoing hypoalbuminemia, and seizure activity while on prograf and has since been switched to cyclosporine without reoccurrence.  He presented to the ER for fever, abdominal pain, and N/V. He reports fever (103) for 1  day prior. Overnight, he developed N/V, reports small amount of green emesis with new left sided pain. He also endorses worsening SOB with exertion. He was scheduled as an outpatient for follow up paracentesis and IR drainage of fluid collection. CXR in ER shows large pleural effusion with subsegmental atelectasis. Infectious work up initiated in ER. His ANC is 900. Broad spectrum antibiotics initiated in ED. At admit, he denied chest pain, palpitations, diarrhea, constipation, back pain, or any sick contacts.    Hospital Course:  Unclear cause of ongoing fevers. Multiple infectious, diagnostic testing completed. Taken to OR for ex lap 2/6, loculated fluid collections found, cultures NGTD. Multiple blood cultures with NGTD. Thoracentesis 1/11, 1/18, 1/31, and 2/16, cultures NGTD. Paracentesis 1/11, 1/19, and 2/5, cultures NGTD. negative fungal markers/quant gold intermediate. Negative TTE.  IR drainage 1/11, cultures NGTD. Continued with intermittent fevers despite broad spectrum antibiotics that were initially started 1/11, then broadened antibiotics 2/22 from vanc/zosyn to ertapenem. Multiple CT scan of abd obtained that were unremarkable. Tagged WBCs scan 2/27 without source of infection found. He had several episodes of neutropenia requiring neupogen, last given 2/28. 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 Monitoring weekly CMV PCR. Chimerism studies obtained 3/1 to assess for GVHD, which was negative, EBV PCR negative, adenovirus negative. In addition to fevers, he has consistently c/o abdominal pain and N/V. Tbili has remain elevated post-op requiring multiple ERCPs. ERCPs performed this admission on 1/30, 2/19, and 3/9. ERCPs with sludge and stones with stents placed. Last ERCP 3/9 with metal stent placed into CBD and stones removed. His ertapenem was d/cd 3/7 due to AMS. Following ERCP 3/9, he was started on cefepime and flagyl  for possible cholangitis. He has remained afebrile since 3/10. He completed cefepime/flagyl after 3 weeks on 3/19 & 3/20. Due to elevated enzymes, he had a liver biopsy 1/23 consistent with cholestatic hepatitis (no cultures obtained during biopsy). GI was consulted for persistent N/V and malnutrition. EGD performed 1/25 and 3/6, EGD 3/6 with normal esophagus congested, erythematous and nodular mucosa in the stomach, one duodenal ulcer with a clean ulcer base (Emir Class III), biopsies taken, negative for CMV and GVHD. Due to N/V and abdominal pain, multiple attempts were made to place SHANE tube with tube feeding, but patient did not tolerate TF. Therefore, he was transitioned to TPN 1/16 then again on 2/24. He also has chronic constipation. Small bowel follow through study with normal findings. Following EGD 1/25, he was started on a PPI, miralax, and Metamucil, but he did not tolerate meds. For appetite stimulation he was trialed on marinol. He also did well with reglan before meals and at bedtime. Early post-op liver transplant surgery, he was switched to cyclosporine for seizures. During this admission, he was transitioned back to prograf 2/24 with keppra on board. Starting 3/1 he developed stuttering speech, disorientation. CT head 3/1 and MRI head 3/6 unremarkable. EEG 3/8 with no seizure activity. He started on rapamune and stress dose steroids 3/10, then ultimately returned to cyclosporine 3/13 and rapamune d/cd same day. Since transitioning back to cyclosporine, his mental status has remained stable. During admission, patient was evaluated by [facility] for future kidney transplant, and he was deemed to be a suitable candidate once medically stable and cleared. He dialyzes on a M-W-F schedule. He was anuric on admission, but starting producing urine around 3/16.  Now producing >2-3L per day.       Interval History: No acute events overnight. Slowly trying to improve caloric intake, goal 300-500 jalen/day, goal is  to wean TPN once 700 jalen/day. Pt able to have full dinner 3/27, 3/28, 3/29 night- will likely halve TPN.  Continue nocturnal TPN/Lipids for now.  Continue reglan and marinol. Last HD Monday 3/29 (plan is twice weekly until starts clearing). Continue Torsemide 50 mg daily for volume- pt made 2.4 L urine yesterday.  He remains afebrile off antibiotics. No issues with restart of Cellcept 500 mg BID. Monitor.     Scheduled Meds:   bisacodyl  10 mg Oral Daily    cycloSPORINE modified  125 mg Oral BID    docusate sodium  100 mg Oral BID    dronabinol  2.5 mg Oral QHS    epoetin maurisio (PROCRIT) injection  12,600 Units Intravenous Every Mon, Wed, Fri    ergocalciferol  50,000 Units Oral Q7 Days    heparin (porcine)  5,000 Units Subcutaneous Q8H    levetiracetam oral soln  500 mg Oral BID    levothyroxine  75 mcg Oral Before breakfast    metoclopramide HCl  10 mg Oral QID (AC & HS)    mycophenolate  500 mg Oral BID    omeprazole  40 mg Oral QAM    polyethylene glycol  17 g Oral BID    predniSONE  15 mg Oral Daily    psyllium husk (aspartame)  3.4 g Oral BID    torsemide  50 mg Oral Daily    ursodiol  300 mg Oral BID     Continuous Infusions:   TPN ADULT CENTRAL LINE CUSTOM       PRN Meds:sodium chloride 0.9%, acetaminophen, albuterol-ipratropium 2.5mg-0.5mg/3mL, bisacodyl, butalbital-acetaminophen-caffeine -40 mg, dextrose 50%, dextrose 50%, diphenhydrAMINE-zinc acetate 1-0.1%, glucagon (human recombinant), glucose, glucose, heparin (porcine), naloxone, ondansetron, ondansetron, oxyCODONE, oxyCODONE, prochlorperazine, prochlorperazine, simethicone, sodium chloride 0.9%, sodium chloride 0.9%    Review of Systems   Constitutional: Positive for activity change, appetite change and fatigue. Negative for chills and fever.   HENT: Negative for congestion and facial swelling.    Eyes: Negative for pain, discharge and visual disturbance.   Respiratory: Negative.  Negative for cough, chest tightness, shortness  of breath and wheezing.    Cardiovascular: Negative.  Negative for chest pain, palpitations and leg swelling.   Gastrointestinal: Positive for abdominal distention, abdominal pain, constipation and nausea. Negative for diarrhea and vomiting.   Endocrine: Negative.    Genitourinary: Negative for decreased urine volume, difficulty urinating, dysuria, hematuria and urgency.   Musculoskeletal: Negative for back pain, gait problem, neck pain and neck stiffness.   Skin: Negative for color change and pallor.   Allergic/Immunologic: Positive for immunocompromised state.   Neurological: Negative for dizziness, seizures, weakness, light-headedness and headaches.   Psychiatric/Behavioral: Negative for behavioral problems, confusion, dysphoric mood, hallucinations, sleep disturbance and suicidal ideas. The patient is not nervous/anxious.      Objective:     Vital Signs (Most Recent):  Temp: 99.3 °F (37.4 °C) (03/30/18 0736)  Pulse: 100 (03/30/18 0736)  Resp: 14 (03/30/18 0736)  BP: (!) 151/89 (03/30/18 0736)  SpO2: 97 % (03/30/18 0736) Vital Signs (24h Range):  Temp:  [98.7 °F (37.1 °C)-99.3 °F (37.4 °C)] 99.3 °F (37.4 °C)  Pulse:  [] 100  Resp:  [14-20] 14  SpO2:  [96 %-98 %] 97 %  BP: (144-158)/(81-94) 151/89     Weight: 70.2 kg (154 lb 12.2 oz)  Body mass index is 24.99 kg/m².    Intake/Output - Last 3 Shifts       03/28 0700 - 03/29 0659 03/29 0700 - 03/30 0659 03/30 0700 - 03/31 0659    P.O. 1520 700     Other  600     .4 589.3     Total Intake(mL/kg) 2071.4 (29.5) 1889.3 (26.9)     Urine (mL/kg/hr) 2895 (1.7) 2440 (1.4)     Emesis/NG output 0 (0) 0 (0)     Other 0 (0) 600 (0.4)     Stool 0 (0) 0 (0)     Blood 0 (0) 0 (0)     Total Output 2895 3040      Net -823.6 -1150.7             Urine Occurrence 0 x 0 x     Stool Occurrence 0 x 0 x 0 x    Emesis Occurrence 0 x 0 x           Physical Exam   Constitutional: He is oriented to person, place, and time. He appears well-developed.   Hand and temporal muscle  wasting   HENT:   Head: Normocephalic.   Eyes: Pupils are equal, round, and reactive to light. No scleral icterus.   Cardiovascular: Normal rate, regular rhythm, normal heart sounds and intact distal pulses.    Pulmonary/Chest: Effort normal and breath sounds normal. No respiratory distress. He has no wheezes. He has no rhonchi. He has no rales.   Abdominal: Soft. Bowel sounds are normal. He exhibits distension. There is no tenderness.   Musculoskeletal: Normal range of motion. He exhibits edema (generalized).   Neurological: He is alert and oriented to person, place, and time.   Skin: Skin is warm and dry.   Psychiatric: He has a normal mood and affect. His behavior is normal. Judgment and thought content normal.   Nursing note and vitals reviewed.      Laboratory:  Immunosuppressants         Stop Route Frequency     mycophenolate capsule 500 mg      -- Oral 2 times daily     cycloSPORINE modified capsule 125 mg      -- Oral 2 times daily     cycloSPORINE modified (NEORAL) 25 MG capsule      03/09 1714       cycloSPORINE modified (NEORAL) 100 MG capsule      03/09 1714       cycloSPORINE modified (NEORAL) 100 MG capsule      03/09 0514       cycloSPORINE modified (NEORAL) 25 MG capsule      03/09 0514          CBC:     Recent Labs  Lab 03/30/18  0430   WBC 4.57   RBC 2.62*   HGB 8.5*   HCT 26.9*   PLT 89*   *   MCH 32.4*   MCHC 31.6*     CMP:     Recent Labs  Lab 03/30/18  0430      CALCIUM 9.0   ALBUMIN 2.8*   PROT 6.1      K 3.7   CO2 25      BUN 55*   CREATININE 2.6*   ALKPHOS 127   ALT 8*   AST 20   BILITOT 1.0     Labs within the past 24 hours have been reviewed.    Diagnostic Results:  I have personally reviewed all pertinent imaging studies.    Assessment/Plan:     Liver transplanted    - Post op course complicated by fevers and hyperbilirubinemia, AST/ALT stable.  - see biliary stricture of transplanted liver  - Liver biopsy 1/23 without rejection.  - Paracentesis 3/6 negative for  infection, wbc 28 segs 3%.  - ERCP, EUS biopsy 3/9/18 - no rejection, mild cholestatic hepatitis seen.   - T bili improved to 1 today.        Long-term use of immunosuppressant medication    - Maintenance IS with cyclosporine --> transitioned to Prograf 2/24 for continued nausea but did not tolerate from neuro standpoint.  PT IS NOT A CANDIDATE FOR TACROLIMUS.    - Transitioned back to cyclosporine 3/1  - D/c cyclosporine, started rapa & stress dose steroids 3/10 for continued confusion  - Mental status now improved, re-trial cyclosporine 3/13, d/c rapa 3/13  - Transition back to prednisone taper from stress dose steroids 3/15 --> will decrease by 5 mg q week.  - Restarted low dose MMF 3/23. Pt tolerating well.        Prophylactic immunotherapy    - See long term use of immunosuppression.         At risk for opportunistic infections    - see other cytomegaloviral disease.  - Pentam given 2/24. Plan to give next dose 3/27  - D/C Isavu 3/21.        Vitamin D deficiency    - Continue ergo 50K weekly.         Other cytomegaloviral diseases    - detected at 370 on CMV PCR 2/1.  - case discussed with ID and will hold off on treatment at this time given low WBC.   - undetected CMV PCR 2/8, 2/15, 2/22, 3/8   - continue Valcyte 200 mg every Mon, Wed, Fri.  - Per ID, valcyte dose decreased to ppx dose on 2/18--Valcyte d/c'd 2/25  - Repeat scope 3/6 to assess persistent N/V and possible CMV causing these symptoms. Biopsies thus far normal, cmv stains negative.  - continue to monitor weekly CMV PCR's, last PCR neg from 3/15, recent level from 3/22 undetected.  - CMV 3/29 pending.        Constipation    - chronic  - encourage ambulation, stool softeners and laxatives, often refuses some meds 2/2 nausea, will often require suppository or brown bomb enema to have a BM.  - Small bowel follow through with normal findings.        Severe protein-calorie malnutrition    - Poor PO intake since transplant requiring short course of TPN  during previous hospital stay.   - Due to N/V and abdominal pain, multiple attempts were made to place SHANE tube with tube feeding, but patient did not tolerate TF  - Transitioned to TPN 1/16 then again on 2/24 due to poor tolerance of SHANE tube/TF  - prealbumin 7 on 1/15 --> prealbumin 22 on 3/19, monitoring weekly (Mondays)  - EGD performed 1/25 and 3/6, gastroparesis, duodenal ulcer, and congested/erythematous gastric mucosa  - Small bowel through with normal findings.  - Consulted GI for GJ tube placement, but was canceled due to ongoing fevers  - Hold Reglan and Marinol 3/8 for neuro symptoms --> restarted reglan and marinol 3/19, will need to dc marinol before discharge because not covered by insurance.  - set goals for caloric intake with patient, continue nocturnal TPN started 3/20 PM to encourage appetite during the day  - Trying to set small caloric goals daily, for now goal is 300-500 jalen/day. Ultimately needs to consume 700 jalen/day to start weaning TPN.   - Ween TPN to half 3/30  - Will attempt to start modified outpt prehab protocol tx for malnutrition: Start creon, continue Vit D, hold omega tabs, supplements as tolerated, check labs weekly (Vits/prealb)        Nausea and vomiting    - h/o constipation, abdominal pain, and N/V  - worsened with SHANE tube and TF --> now on TPN  - encourage bowel regimen  - EGD performed 1/25 and 3/6, gastroparesis, duodenal ulcer, and congested/erythematous gastric mucosa  - Small bowel through with normal findings  - Continue reglan and marinol scheduled, anti-emetics PRN.   - If cont with GI symptoms then may need to consider Flex sig/ C-scope to further eval.         Biliary stricture of transplanted liver    - ERCP 12/6 with post-anastomosis stricture with stent placement.  - Tbili with increase prompting ERCP 1/30 with stones and sludge, stents placed.  - Repeat ERCP 2/19 as was having recurrent fevers with bilirubin stuck at 2.0-2.2 with overall unremarkable findings  seen. Did place new larger stents.   - ERCP/EUS 3/9 with metal stent placed in bile duct, stones removed.  - Tbili remains stable, 1 today. Continue actigall.        Anemia of chronic disease    - 1 U PRBC 3/12.  - Monitor daily labs. H/H stable, transfuse PRN.         Delayed surgical wound healing    - Wd vac removed 1/12/18.    - Wound healing well.   - Changing dressing q Tuesday, last 3/27.        Seizure    - 3/1, patient with stuttering speech, trouble recalling specific words when speaking. Does have hx of seizure (on keppra) previously while on prograf. Switched to prograf over the weekend from cyclosporine. Neuro exam unremarkable.   - CT head without contrast obtained, unremarkable.   - In light of these symptoms, prograf d/c, restart cyclosporine 3/1.  - remains w/o seizure. Cont to have stutter/garbled speech/difficulty finding words.  Worse post EGD as sedation was given.  - MRI 3/6 unremarkable.  - EEG completed 3/8 - mild, generalized, non-specific cerebral dysfunction, no epileptiform activity.  - monitor closely. VSS.        Acute renal failure with tubular necrosis    - HD resumed on previous admission. Anuric on admission and dialyzes M-W-F.  - Nephrology following.    - Goal is for kidney txp in future- currently not a candidate due to health status.  - urine output has improved since 3/16 --> 2.6 L 3/27!  - HD held 3/21 & 3/23  - Nephrology started torsemide 50 mg daily 3/24- continue for now for volume management  - BUN elevated though on TPN, HD 3/29/18   - Will plan HD twice weekly for now            VTE Risk Mitigation         Ordered     heparin (porcine) injection 1,000 Units  As needed (PRN)     Route:  Intra-Catheter        03/12/18 1134     heparin (porcine) injection 5,000 Units  Every 8 hours     Route:  Subcutaneous        03/08/18 1622     Medium Risk of VTE  Once      01/11/18 0351     Place sequential compression device  Until discontinued      01/11/18 0351          The  patients clinical status was discussed at multidisplinary rounds, involving transplant surgery, transplant medicine, pharmacy, nursing, nutrition, and social work    Discharge Planning:  Monitor kidney function --> may need outpt HD  Monitor HH needs vs rehab for deconditioned status      Geronimo Vera PA-C  Liver Transplant  [facility]

## 2018-03-30 NOTE — ASSESSMENT & PLAN NOTE
- HD resumed on previous admission. Anuric on admission and dialyzes M-W-F.  - Nephrology following.    - Goal is for kidney txp in future- currently not a candidate due to health status.  - urine output has improved since 3/16 --> 2.6 L 3/27!  - HD held 3/21 & 3/23  - Nephrology started torsemide 50 mg daily 3/24- continue for now for volume management  - BUN elevated though on TPN, HD 3/29/18   - Will plan HD twice weekly for now

## 2018-03-30 NOTE — PLAN OF CARE
Patient's VSS for shift. See note for patient's pain issues. Possibly related to administration Procrit. Patient now resting in NAD. Sitting up in chair watching television. Has put out over a liter in urine today. Has eaten nothing by mouth all day. Sips of water with meds. Patient free from falls, injury, and skin breakdown.

## 2018-03-30 NOTE — PLAN OF CARE
Problem: Patient Care Overview  Goal: Plan of Care Review  Outcome: Ongoing (interventions implemented as appropriate)  -AAOx4  -HD 2 x weekly. Went to HD yesterday. No fluid removed just filtered.   -C/o abdominal pain moderately relieved with PRN oxy.   -Marinol and Reglan given to increase appetite. Pt refused to eat dinner.   -Nocturnal TPN and lipids continued @ 72mL/hr.  -Hand hygiene performed before and after care.   -See flow sheet for assessment details.     Problem: Fall Risk (Adult)  Goal: Identify Related Risk Factors and Signs and Symptoms  Related risk factors and signs and symptoms are identified upon initiation of Human Response Clinical Practice Guideline (CPG)   Outcome: Ongoing (interventions implemented as appropriate)  -Independent, mother @ bedside attentive to pt needs.   -Pt wears non-slip socks when ambulating. Ambulated in the hallway.   -Pt free from falls/injuries thus far this shift.   -Bed in low, locked position. Bed rails up x2. Call light within reach.

## 2018-03-30 NOTE — ASSESSMENT & PLAN NOTE
- Poor PO intake since transplant requiring short course of TPN during previous hospital stay.   - Due to N/V and abdominal pain, multiple attempts were made to place SHANE tube with tube feeding, but patient did not tolerate TF  - Transitioned to TPN 1/16 then again on 2/24 due to poor tolerance of SHANE tube/TF  - prealbumin 7 on 1/15 --> prealbumin 22 on 3/19, monitoring weekly (Mondays)  - EGD performed 1/25 and 3/6, gastroparesis, duodenal ulcer, and congested/erythematous gastric mucosa  - Small bowel through with normal findings.  - Consulted GI for GJ tube placement, but was canceled due to ongoing fevers  - Hold Reglan and Marinol 3/8 for neuro symptoms --> restarted reglan and marinol 3/19, will need to dc marinol before discharge because not covered by insurance.  - set goals for caloric intake with patient, continue nocturnal TPN started 3/20 PM to encourage appetite during the day  - Trying to set small caloric goals daily, for now goal is 300-500 jalen/day. Ultimately needs to consume 700 jalen/day to start weaning TPN.   - Ween TPN to half 3/30  - Will attempt to start modified outpt prehab protocol tx for malnutrition: Start creon, continue Vit D, hold omega tabs, supplements as tolerated, check labs weekly (Vits/prealb)

## 2018-03-30 NOTE — SUBJECTIVE & OBJECTIVE
Scheduled Meds:   bisacodyl  10 mg Oral Daily    cycloSPORINE modified  125 mg Oral BID    docusate sodium  100 mg Oral BID    dronabinol  2.5 mg Oral QHS    epoetin maurisio (PROCRIT) injection  12,600 Units Intravenous Every Mon, Wed, Fri    ergocalciferol  50,000 Units Oral Q7 Days    heparin (porcine)  5,000 Units Subcutaneous Q8H    levetiracetam oral soln  500 mg Oral BID    levothyroxine  75 mcg Oral Before breakfast    metoclopramide HCl  10 mg Oral QID (AC & HS)    mycophenolate  500 mg Oral BID    omeprazole  40 mg Oral QAM    polyethylene glycol  17 g Oral BID    predniSONE  15 mg Oral Daily    psyllium husk (aspartame)  3.4 g Oral BID    torsemide  50 mg Oral Daily    ursodiol  300 mg Oral BID     Continuous Infusions:   TPN ADULT CENTRAL LINE CUSTOM       PRN Meds:sodium chloride 0.9%, acetaminophen, albuterol-ipratropium 2.5mg-0.5mg/3mL, bisacodyl, butalbital-acetaminophen-caffeine -40 mg, dextrose 50%, dextrose 50%, diphenhydrAMINE-zinc acetate 1-0.1%, glucagon (human recombinant), glucose, glucose, heparin (porcine), naloxone, ondansetron, ondansetron, oxyCODONE, oxyCODONE, prochlorperazine, prochlorperazine, simethicone, sodium chloride 0.9%, sodium chloride 0.9%    Review of Systems   Constitutional: Positive for activity change, appetite change and fatigue. Negative for chills and fever.   HENT: Negative for congestion and facial swelling.    Eyes: Negative for pain, discharge and visual disturbance.   Respiratory: Negative.  Negative for cough, chest tightness, shortness of breath and wheezing.    Cardiovascular: Negative.  Negative for chest pain, palpitations and leg swelling.   Gastrointestinal: Positive for abdominal distention, abdominal pain, constipation and nausea. Negative for diarrhea and vomiting.   Endocrine: Negative.    Genitourinary: Negative for decreased urine volume, difficulty urinating, dysuria, hematuria and urgency.   Musculoskeletal: Negative for back  pain, gait problem, neck pain and neck stiffness.   Skin: Negative for color change and pallor.   Allergic/Immunologic: Positive for immunocompromised state.   Neurological: Negative for dizziness, seizures, weakness, light-headedness and headaches.   Psychiatric/Behavioral: Negative for behavioral problems, confusion, dysphoric mood, hallucinations, sleep disturbance and suicidal ideas. The patient is not nervous/anxious.      Objective:     Vital Signs (Most Recent):  Temp: 99.3 °F (37.4 °C) (03/30/18 0736)  Pulse: 100 (03/30/18 0736)  Resp: 14 (03/30/18 0736)  BP: (!) 151/89 (03/30/18 0736)  SpO2: 97 % (03/30/18 0736) Vital Signs (24h Range):  Temp:  [98.7 °F (37.1 °C)-99.3 °F (37.4 °C)] 99.3 °F (37.4 °C)  Pulse:  [] 100  Resp:  [14-20] 14  SpO2:  [96 %-98 %] 97 %  BP: (144-158)/(81-94) 151/89     Weight: 70.2 kg (154 lb 12.2 oz)  Body mass index is 24.99 kg/m².    Intake/Output - Last 3 Shifts       03/28 0700 - 03/29 0659 03/29 0700 - 03/30 0659 03/30 0700 - 03/31 0659    P.O. 1520 700     Other  600     .4 589.3     Total Intake(mL/kg) 2071.4 (29.5) 1889.3 (26.9)     Urine (mL/kg/hr) 2895 (1.7) 2440 (1.4)     Emesis/NG output 0 (0) 0 (0)     Other 0 (0) 600 (0.4)     Stool 0 (0) 0 (0)     Blood 0 (0) 0 (0)     Total Output 2895 3040      Net -823.6 -1150.7             Urine Occurrence 0 x 0 x     Stool Occurrence 0 x 0 x 0 x    Emesis Occurrence 0 x 0 x           Physical Exam   Constitutional: He is oriented to person, place, and time. He appears well-developed.   Hand and temporal muscle wasting   HENT:   Head: Normocephalic.   Eyes: Pupils are equal, round, and reactive to light. No scleral icterus.   Cardiovascular: Normal rate, regular rhythm, normal heart sounds and intact distal pulses.    Pulmonary/Chest: Effort normal and breath sounds normal. No respiratory distress. He has no wheezes. He has no rhonchi. He has no rales.   Abdominal: Soft. Bowel sounds are normal. He exhibits distension.  There is no tenderness.   Musculoskeletal: Normal range of motion. He exhibits edema (generalized).   Neurological: He is alert and oriented to person, place, and time.   Skin: Skin is warm and dry.   Psychiatric: He has a normal mood and affect. His behavior is normal. Judgment and thought content normal.   Nursing note and vitals reviewed.      Laboratory:  Immunosuppressants         Stop Route Frequency     mycophenolate capsule 500 mg      -- Oral 2 times daily     cycloSPORINE modified capsule 125 mg      -- Oral 2 times daily     cycloSPORINE modified (NEORAL) 25 MG capsule      03/09 1714       cycloSPORINE modified (NEORAL) 100 MG capsule      03/09 1714       cycloSPORINE modified (NEORAL) 100 MG capsule      03/09 0514       cycloSPORINE modified (NEORAL) 25 MG capsule      03/09 0514          CBC:     Recent Labs  Lab 03/30/18  0430   WBC 4.57   RBC 2.62*   HGB 8.5*   HCT 26.9*   PLT 89*   *   MCH 32.4*   MCHC 31.6*     CMP:     Recent Labs  Lab 03/30/18  0430      CALCIUM 9.0   ALBUMIN 2.8*   PROT 6.1      K 3.7   CO2 25      BUN 55*   CREATININE 2.6*   ALKPHOS 127   ALT 8*   AST 20   BILITOT 1.0     Labs within the past 24 hours have been reviewed.    Diagnostic Results:  I have personally reviewed all pertinent imaging studies.

## 2018-03-30 NOTE — PROGRESS NOTES
"Called again to patient's room around 1645 where patient was very tearful and stating that pain in back was now "unbearable". Stated that this happened other time he was given Neupogen. Described it as deep throbbing pain in back. Spoke with Dr Zaldivar who wrote for 1 time dose of 50 mcg of fentanyl. Reassessed patient 15 minutes later-states pain under much better control now.   "

## 2018-03-31 LAB
25(OH)D3+25(OH)D2 SERPL-MCNC: 20 NG/ML
ALBUMIN SERPL BCP-MCNC: 2.9 G/DL
ALP SERPL-CCNC: 128 U/L
ALT SERPL W/O P-5'-P-CCNC: 7 U/L
ANION GAP SERPL CALC-SCNC: 13 MMOL/L
AST SERPL-CCNC: 20 U/L
BASOPHILS # BLD AUTO: 0.01 K/UL
BASOPHILS NFR BLD: 0.2 %
BILIRUB SERPL-MCNC: 1.1 MG/DL
BUN SERPL-MCNC: 67 MG/DL
CALCIUM SERPL-MCNC: 9.4 MG/DL
CHLORIDE SERPL-SCNC: 101 MMOL/L
CO2 SERPL-SCNC: 26 MMOL/L
CREAT SERPL-MCNC: 3.1 MG/DL
CYCLOSPORINE BLD LC/MS/MS-MCNC: 229 NG/ML
DIFFERENTIAL METHOD: ABNORMAL
EOSINOPHIL # BLD AUTO: 0 K/UL
EOSINOPHIL NFR BLD: 0.9 %
ERYTHROCYTE [DISTWIDTH] IN BLOOD BY AUTOMATED COUNT: 17.2 %
EST. GFR  (AFRICAN AMERICAN): 30 ML/MIN/1.73 M^2
EST. GFR  (NON AFRICAN AMERICAN): 26 ML/MIN/1.73 M^2
GLUCOSE SERPL-MCNC: 89 MG/DL
HCT VFR BLD AUTO: 27.8 %
HGB BLD-MCNC: 8.8 G/DL
IMM GRANULOCYTES # BLD AUTO: 0.06 K/UL
IMM GRANULOCYTES NFR BLD AUTO: 1.3 %
LYMPHOCYTES # BLD AUTO: 1.1 K/UL
LYMPHOCYTES NFR BLD: 23.1 %
MAGNESIUM SERPL-MCNC: 1.8 MG/DL
MCH RBC QN AUTO: 32.2 PG
MCHC RBC AUTO-ENTMCNC: 31.7 G/DL
MCV RBC AUTO: 102 FL
MONOCYTES # BLD AUTO: 0.7 K/UL
MONOCYTES NFR BLD: 15 %
NEUTROPHILS # BLD AUTO: 2.8 K/UL
NEUTROPHILS NFR BLD: 59.5 %
NRBC BLD-RTO: 0 /100 WBC
PHOSPHATE SERPL-MCNC: 4.1 MG/DL
PLATELET # BLD AUTO: 92 K/UL
PMV BLD AUTO: 12.1 FL
POTASSIUM SERPL-SCNC: 3.8 MMOL/L
PROT SERPL-MCNC: 6.2 G/DL
RBC # BLD AUTO: 2.73 M/UL
SODIUM SERPL-SCNC: 140 MMOL/L
WBC # BLD AUTO: 4.67 K/UL

## 2018-03-31 PROCEDURE — 25000003 PHARM REV CODE 250: Performed by: NURSE PRACTITIONER

## 2018-03-31 PROCEDURE — 82180 ASSAY OF ASCORBIC ACID: CPT

## 2018-03-31 PROCEDURE — 80053 COMPREHEN METABOLIC PANEL: CPT

## 2018-03-31 PROCEDURE — 63600175 PHARM REV CODE 636 W HCPCS

## 2018-03-31 PROCEDURE — 82306 VITAMIN D 25 HYDROXY: CPT

## 2018-03-31 PROCEDURE — 83735 ASSAY OF MAGNESIUM: CPT

## 2018-03-31 PROCEDURE — B4185 PARENTERAL SOL 10 GM LIPIDS: HCPCS | Performed by: SURGERY

## 2018-03-31 PROCEDURE — 25000003 PHARM REV CODE 250: Performed by: SURGERY

## 2018-03-31 PROCEDURE — A4217 STERILE WATER/SALINE, 500 ML: HCPCS | Performed by: SURGERY

## 2018-03-31 PROCEDURE — 94761 N-INVAS EAR/PLS OXIMETRY MLT: CPT

## 2018-03-31 PROCEDURE — 20600001 HC STEP DOWN PRIVATE ROOM

## 2018-03-31 PROCEDURE — 84446 ASSAY OF VITAMIN E: CPT

## 2018-03-31 PROCEDURE — 63600175 PHARM REV CODE 636 W HCPCS: Performed by: NURSE PRACTITIONER

## 2018-03-31 PROCEDURE — 80158 DRUG ASSAY CYCLOSPORINE: CPT

## 2018-03-31 PROCEDURE — 63600175 PHARM REV CODE 636 W HCPCS: Performed by: SURGERY

## 2018-03-31 PROCEDURE — 99900035 HC TECH TIME PER 15 MIN (STAT)

## 2018-03-31 PROCEDURE — 99233 SBSQ HOSP IP/OBS HIGH 50: CPT | Mod: ,,, | Performed by: PHYSICIAN ASSISTANT

## 2018-03-31 PROCEDURE — 85025 COMPLETE CBC W/AUTO DIFF WBC: CPT

## 2018-03-31 PROCEDURE — 25000003 PHARM REV CODE 250

## 2018-03-31 PROCEDURE — 94664 DEMO&/EVAL PT USE INHALER: CPT

## 2018-03-31 PROCEDURE — 84100 ASSAY OF PHOSPHORUS: CPT

## 2018-03-31 PROCEDURE — 25000003 PHARM REV CODE 250: Performed by: PHYSICIAN ASSISTANT

## 2018-03-31 RX ORDER — PSEUDOEPHEDRINE/ACETAMINOPHEN 30MG-500MG
100 TABLET ORAL
Status: DISPENSED | OUTPATIENT
Start: 2018-03-31 | End: 2018-03-31

## 2018-03-31 RX ORDER — PREDNISONE 10 MG/1
10 TABLET ORAL DAILY
Status: DISCONTINUED | OUTPATIENT
Start: 2018-04-01 | End: 2018-04-06

## 2018-03-31 RX ORDER — BISACODYL 10 MG
10 SUPPOSITORY, RECTAL RECTAL ONCE
Status: DISCONTINUED | OUTPATIENT
Start: 2018-03-31 | End: 2018-04-03

## 2018-03-31 RX ORDER — SYRING-NEEDL,DISP,INSUL,0.3 ML 29 G X1/2"
296 SYRINGE, EMPTY DISPOSABLE MISCELLANEOUS
Status: DISPENSED | OUTPATIENT
Start: 2018-03-31 | End: 2018-03-31

## 2018-03-31 RX ADMIN — OXYCODONE HYDROCHLORIDE 10 MG: 5 TABLET ORAL at 04:03

## 2018-03-31 RX ADMIN — MYCOPHENOLATE MOFETIL 500 MG: 250 CAPSULE ORAL at 08:03

## 2018-03-31 RX ADMIN — OXYCODONE HYDROCHLORIDE 10 MG: 5 TABLET ORAL at 10:03

## 2018-03-31 RX ADMIN — SOYBEAN OIL 250 ML: 20 INJECTION, SOLUTION INTRAVENOUS at 10:03

## 2018-03-31 RX ADMIN — LEVETIRACETAM 500 MG: 100 SOLUTION ORAL at 08:03

## 2018-03-31 RX ADMIN — OMEPRAZOLE 40 MG: 40 CAPSULE, DELAYED RELEASE ORAL at 07:03

## 2018-03-31 RX ADMIN — PANCRELIPASE 2 CAPSULE: 24000; 76000; 120000 CAPSULE, DELAYED RELEASE PELLETS ORAL at 08:03

## 2018-03-31 RX ADMIN — METOCLOPRAMIDE HYDROCHLORIDE 10 MG: 5 SOLUTION ORAL at 06:03

## 2018-03-31 RX ADMIN — CYCLOSPORINE 125 MG: 100 CAPSULE, LIQUID FILLED ORAL at 06:03

## 2018-03-31 RX ADMIN — OXYCODONE HYDROCHLORIDE 10 MG: 5 TABLET ORAL at 08:03

## 2018-03-31 RX ADMIN — OXYCODONE HYDROCHLORIDE 10 MG: 5 TABLET ORAL at 06:03

## 2018-03-31 RX ADMIN — PREDNISONE 15 MG: 5 TABLET ORAL at 08:03

## 2018-03-31 RX ADMIN — PROCHLORPERAZINE EDISYLATE 10 MG: 5 INJECTION INTRAMUSCULAR; INTRAVENOUS at 08:03

## 2018-03-31 RX ADMIN — METOCLOPRAMIDE HYDROCHLORIDE 10 MG: 5 SOLUTION ORAL at 12:03

## 2018-03-31 RX ADMIN — METOCLOPRAMIDE HYDROCHLORIDE 10 MG: 5 SOLUTION ORAL at 08:03

## 2018-03-31 RX ADMIN — SODIUM CHLORIDE: 234 INJECTION INTRAMUSCULAR; INTRAVENOUS; SUBCUTANEOUS at 10:03

## 2018-03-31 RX ADMIN — DOCUSATE SODIUM 100 MG: 100 CAPSULE, LIQUID FILLED ORAL at 08:03

## 2018-03-31 RX ADMIN — URSODIOL 300 MG: 300 CAPSULE ORAL at 08:03

## 2018-03-31 RX ADMIN — LEVOTHYROXINE SODIUM 75 MCG: 75 TABLET ORAL at 06:03

## 2018-03-31 RX ADMIN — OXYCODONE HYDROCHLORIDE 10 MG: 5 TABLET ORAL at 12:03

## 2018-03-31 RX ADMIN — DRONABINOL 2.5 MG: 2.5 CAPSULE ORAL at 08:03

## 2018-03-31 RX ADMIN — FENTANYL CITRATE 50 MCG: 50 INJECTION, SOLUTION INTRAMUSCULAR; INTRAVENOUS at 03:03

## 2018-03-31 RX ADMIN — CYCLOSPORINE 125 MG: 100 CAPSULE, LIQUID FILLED ORAL at 08:03

## 2018-03-31 NOTE — PLAN OF CARE
"Problem: Patient Care Overview  Goal: Plan of Care Review  Outcome: Ongoing (interventions implemented as appropriate)    Pt AAOx4 with mother at the bedside.  Pt mother is attentive to and supportive of pt and actively involved in pt care.    L IJ TLC CDI. Dressing reinforced.  TPN weaned to half. TPN @ 32 mL/hr. No lipids ordered. Take down TPN around 9AM.  Calorie count initiated. Pt ate half of a turkey sandwich at dinner. Receipt placed in envelope on pt door.    R SC HD cath CDI.  Last HD 3/29. 0mL off. Pt currently M-W-F, but plan is to switch to 2x/week.    450 mL in urine output thus far tonight.  No BM    Pt threw up once overnight almost immediately after taking PM meds.  IV Compazine given, but pt wanted to forego taking any "non-essential" medications.  Pt given CellCept and Keppra to take again, but did not receive Reglan, Colace, Actigall, or Marinol again.    Pt c/o 7-8/10 lower back pain for which he received Oxy 10 mg once and IV Fentanly 50 mcg twice for BTP unrelieved by oral pain medication.     Pt refused heparin shots.  Pt walked the halls while pushing wheelchair tonight.     Pt remained free from falls or injury thus far.   Bed is in low/ locked position, side rails are up x 2, call light is in reach.   Will continue to monitor.                 "

## 2018-03-31 NOTE — ASSESSMENT & PLAN NOTE
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  - Tbili with increase prompting ERCP 1/30 with stones and sludge, stents placed.  - Repeat ERCP 2/19 as was having recurrent fevers with bilirubin stuck at 2.0-2.2 with overall unremarkable findings seen. Did place new larger stents.   - ERCP/EUS 3/9 with metal stent placed in bile duct, stones removed.  - Tbili remains stable.   - Continue actigall.

## 2018-03-31 NOTE — ASSESSMENT & PLAN NOTE
- Post op course complicated by fevers and hyperbilirubinemia, AST/ALT stable.  - see biliary stricture of transplanted liver  - Liver biopsy 1/23 without rejection.  - Paracentesis 3/6 negative for infection, wbc 28 segs 3%.  - ERCP, EUS biopsy 3/9/18 - no rejection, mild cholestatic hepatitis seen.   - T bili normalized

## 2018-03-31 NOTE — ASSESSMENT & PLAN NOTE
- Poor PO intake since transplant requiring short course of TPN during previous hospital stay.   - Due to N/V and abdominal pain, multiple attempts were made to place SHANE tube with tube feeding, but patient did not tolerate TF  - Transitioned to TPN 1/16 then again on 2/24 due to poor tolerance of SHANE tube/TF  - prealbumin 7 on 1/15 --> prealbumin 22 on 3/19, monitoring weekly (Mondays)  - EGD performed 1/25 and 3/6, gastroparesis, duodenal ulcer, and congested/erythematous gastric mucosa  - Small bowel through with normal findings.  - Consulted GI for GJ tube placement, but was canceled due to ongoing fevers  - Hold Reglan and Marinol 3/8 for neuro symptoms --> restarted reglan and marinol 3/19, will need to dc marinol before discharge because not covered by insurance.  - set goals for caloric intake with patient, continue nocturnal TPN started 3/20 PM to encourage appetite during the day  - Trying to set small caloric goals daily, for now goal is 300-500 jalen/day. Ultimately needs to consume 700 jalen/day to start weaning TPN.   - Ween TPN to half 3/30 --> back up to full 3/31 as not eating

## 2018-03-31 NOTE — ASSESSMENT & PLAN NOTE
- Maintenance IS with cyclosporine --> transitioned to Prograf 2/24 for continued nausea but did not tolerate from neuro standpoint.  PT IS NOT A CANDIDATE FOR TACROLIMUS.    - Transitioned back to cyclosporine 3/1  - D/c cyclosporine, started rapa & stress dose steroids 3/10 for continued confusion  - Mental status now improved, re-trial cyclosporine 3/13, d/c rapa 3/13  - Transition back to prednisone taper from stress dose steroids 3/15 --> will decrease by 5 mg q week.  - Restarted low dose MMF 3/23- with nausea today --> D/C Cellcept 3/31

## 2018-03-31 NOTE — ASSESSMENT & PLAN NOTE
- h/o constipation, abdominal pain, and N/V  - worsened with SHANE tube and TF --> now on TPN  - encourage bowel regimen  - EGD performed 1/25 and 3/6, gastroparesis, duodenal ulcer, and congested/erythematous gastric mucosa  - Small bowel through with normal findings  - Continue reglan and marinol scheduled, anti-emetics PRN.   - If cont with GI symptoms then may need to consider Flex sig/ C-scope to further eval.   - Abd xray with distended colon - enema 3/31  - D/C Cellcept

## 2018-03-31 NOTE — PROGRESS NOTES
Pt c/o 8/10 pain to lower back. Pt received Procrit today.  Pt has already received 5 mg Oxy a little over an hour ago and still in a lot of pain.    MD on call for abdominal transplant, Dr Zaldivar, notified and MD increased order for Oxycodone to 10 mg Q4h and added Fentanyl 50 mcg Q2h or BTP unrelieved by oral pain medication.     Will continue to monitor.

## 2018-03-31 NOTE — PROGRESS NOTES
"Notified LUISANA ABARCA of the following:  Patient refusing ordered enema and suppository.   Patient aware that the enema and suppository were ordered to aid in reliving nausea and help with appetite.   Patient stated "It's gas and will pass on it's own."   PA stated continue to encourage patient to follow recommendations, will reassess need tomorrow.   Will continue to monitor.     "

## 2018-03-31 NOTE — PLAN OF CARE
Problem: Patient Care Overview  Goal: Plan of Care Review  Patient is AAOx4. Patient educated to use call bell for assistance, verbalized understanding.   Patient ambulates with standby assistance, remains free from falls or injury so far this shift.   Patient's mother at bedside, attentive to patient's needs.   Afebrile, WBC 4.6.   R abdominal hydrocolloid dressing CDI - dressing change due 4/3/18.   Continuing nocturnal TPN and lipids. Patient continues to have minimal appetite. Patient ate half of a blueberry muffin for breakfast and half of a turkey sandwich for lunch.   Abdominal x-ray with increased gas, patient refusing stool softeners, laxatives, enema and suppository. Last bowel movement 3/30/18.   HD MWF - torsemide dcd, urine output 600 cc so far this shift.   Patient complained of back pain once so far this shift, PRN oxycodone admin per order.   See flow sheet for complete assessment details.

## 2018-03-31 NOTE — PROGRESS NOTES
[facility]  Liver Transplant  Progress Note    Patient Name: Jhonny Diana  MRN: [id number]  Admission Date: 2018  Hospital Length of Stay: 79 days  Code Status: Full Code  Primary Care Provider: Primary Doctor No  Post-Operative Day: 163    ORGAN:   LIVER  Disease Etiology: Acute Alcoholic Hepatitis  Donor Type:    - Brain Death  CDC High Risk:   No  Donor CMV Status:   Donor CMV Status: Positive  Donor HBcAB:   Negative  Donor HCV Status:   Negative  Whole or Partial: Whole Liver  Biliary Anastomosis: End to End  Arterial Anatomy: Standard  Subjective:     History of Present Illness:  Jhonny Diana is a 26 y/o male with past medical history of alcoholic cirrhosis.  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 Found to have peritonitis in November (WBC 5000, 75% PNM) neg for bile leak. He was treated initially with vanc/cefepime. Repeat cell counts  with some improvement (WBC 1400, 45% PNM). He has undergone multiple paracenteses as well as abscess drainage of perihepatic fluid collections and treated with antimicrobial therapy but no positive cultures. Of note, biliary stricture also identified and ERCP performed on 2017 with sphincterotomy and biliary stent placed. Liver tests still have not normalized despite intervention, bilirubin and AP remain elevated. Other pertinent PMH current wound vac in place 2/2 wound infection, malnutrition requiring TPN for short course and ongoing hypoalbuminemia, and seizure activity while on prograf and has since been switched to cyclosporine without reoccurrence.  He presented to the ER for fever, abdominal pain, and N/V. He reports fever (103) for 1  day prior. Overnight, he developed N/V, reports small amount of green emesis with new left sided pain. He also endorses worsening SOB with exertion. He was scheduled as an outpatient for follow up paracentesis and IR drainage of fluid collection. CXR in ER shows large pleural effusion with subsegmental atelectasis. Infectious work up initiated in ER. His ANC is 900. Broad spectrum antibiotics initiated in ED. At admit, he denied chest pain, palpitations, diarrhea, constipation, back pain, or any sick contacts.    Hospital Course:  Unclear cause of ongoing fevers. Multiple infectious, diagnostic testing completed. Taken to OR for ex lap 2/6, loculated fluid collections found, cultures NGTD. Multiple blood cultures with NGTD. Thoracentesis 1/11, 1/18, 1/31, and 2/16, cultures NGTD. Paracentesis 1/11, 1/19, and 2/5, cultures NGTD. negative fungal markers/quant gold intermediate. Negative TTE.  IR drainage 1/11, cultures NGTD. Continued with intermittent fevers despite broad spectrum antibiotics that were initially started 1/11, then broadened antibiotics 2/22 from vanc/zosyn to ertapenem. Multiple CT scan of abd obtained that were unremarkable. Tagged WBCs scan 2/27 without source of infection found. He had several episodes of neutropenia requiring neupogen, last given 2/28. 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 Monitoring weekly CMV PCR. Chimerism studies obtained 3/1 to assess for GVHD, which was negative, EBV PCR negative, adenovirus negative. In addition to fevers, he has consistently c/o abdominal pain and N/V. Tbili has remain elevated post-op requiring multiple ERCPs. ERCPs performed this admission on 1/30, 2/19, and 3/9. ERCPs with sludge and stones with stents placed. Last ERCP 3/9 with metal stent placed into CBD and stones removed. His ertapenem was d/cd 3/7 due to AMS. Following ERCP 3/9, he was started on cefepime and flagyl  for possible cholangitis. He has remained afebrile since 3/10. He completed cefepime/flagyl after 3 weeks on 3/19 & 3/20. Due to elevated enzymes, he had a liver biopsy 1/23 consistent with cholestatic hepatitis (no cultures obtained during biopsy). GI was consulted for persistent N/V and malnutrition. EGD performed 1/25 and 3/6, EGD 3/6 with normal esophagus congested, erythematous and nodular mucosa in the stomach, one duodenal ulcer with a clean ulcer base (Emir Class III), biopsies taken, negative for CMV and GVHD. Due to N/V and abdominal pain, multiple attempts were made to place SHANE tube with tube feeding, but patient did not tolerate TF. Therefore, he was transitioned to TPN 1/16 then again on 2/24. He also has chronic constipation. Small bowel follow through study with normal findings. Following EGD 1/25, he was started on a PPI, miralax, and Metamucil, but he did not tolerate meds. For appetite stimulation he was trialed on marinol. He also did well with reglan before meals and at bedtime. Early post-op liver transplant surgery, he was switched to cyclosporine for seizures. During this admission, he was transitioned back to prograf 2/24 with keppra on board. Starting 3/1 he developed stuttering speech, disorientation. CT head 3/1 and MRI head 3/6 unremarkable. EEG 3/8 with no seizure activity. He started on rapamune and stress dose steroids 3/10, then ultimately returned to cyclosporine 3/13 and rapamune d/cd same day. Since transitioning back to cyclosporine, his mental status has remained stable. During admission, patient was evaluated by [facility] for future kidney transplant, and he was deemed to be a suitable candidate once medically stable and cleared. He dialyzes on a M-W-F schedule. He was anuric on admission, but starting producing urine around 3/16.  Now producing >2-3L per day.       Interval History: No acute events overnight. Slowly trying to improve caloric intake, goal 300-500 jalen/day, goal is  to wean TPN once 700 jalen/day. Pt able to have full dinner 3/27, 3/28, 3/29 night- will likely halve TPN.  PT did not eat at all 3/30- increase back to full TPN.  Abd xray obtained for n/v- with distension.  Enema/suppository ordered (pt can choose).  D/C Cellcept.  Continue nocturnal TPN/Lipids for now.  Continue reglan and marinol. Last HD Monday 3/29 (plan is twice weekly until starts clearing). D/C Torsemide 50 mg daily for now- reassess in AM.  He remains afebrile off antibiotics. Monitor.     Scheduled Meds:   bisacodyl  10 mg Oral Daily    bisacodyl  10 mg Rectal Once    cycloSPORINE modified  125 mg Oral BID    docusate sodium  100 mg Oral BID    dronabinol  2.5 mg Oral QHS    epoetin maurisio (PROCRIT) injection  12,600 Units Intravenous Every Mon, Wed, Fri    ergocalciferol  50,000 Units Oral Q7 Days    glycerin 99.5%  100 mL Rectal ED 1 Time    And    magnesium citrate  296 mL Rectal ED 1 Time    And    sodium chloride 0.9%  500 mL Rectal ED 1 Time    heparin (porcine)  5,000 Units Subcutaneous Q8H    levetiracetam oral soln  500 mg Oral BID    levothyroxine  75 mcg Oral Before breakfast    lipase-protease-amylase 24,000-76,000-120,000 units  2 capsule Oral Daily    metoclopramide HCl  10 mg Oral QID (AC & HS)    mycophenolate  500 mg Oral BID    omeprazole  40 mg Oral QAM    polyethylene glycol  17 g Oral BID    [START ON 4/1/2018] predniSONE  10 mg Oral Daily    psyllium husk (aspartame)  3.4 g Oral BID    ursodiol  300 mg Oral BID     Continuous Infusions:    PRN Meds:sodium chloride 0.9%, acetaminophen, albuterol-ipratropium 2.5mg-0.5mg/3mL, bisacodyl, butalbital-acetaminophen-caffeine -40 mg, dextrose 50%, dextrose 50%, diphenhydrAMINE-zinc acetate 1-0.1%, glucagon (human recombinant), glucose, glucose, heparin (porcine), naloxone, ondansetron, ondansetron, oxyCODONE, oxyCODONE, prochlorperazine, prochlorperazine, simethicone, sodium chloride 0.9%, sodium chloride 0.9%    Review  of Systems   Constitutional: Positive for activity change, appetite change and fatigue. Negative for chills and fever.   HENT: Negative for congestion and facial swelling.    Eyes: Negative for pain, discharge and visual disturbance.   Respiratory: Negative.  Negative for cough, chest tightness, shortness of breath and wheezing.    Cardiovascular: Negative.  Negative for chest pain, palpitations and leg swelling.   Gastrointestinal: Positive for abdominal distention, abdominal pain, constipation, nausea and vomiting. Negative for diarrhea.   Endocrine: Negative.    Genitourinary: Negative for decreased urine volume, difficulty urinating, dysuria, hematuria and urgency.   Musculoskeletal: Negative for back pain, gait problem, neck pain and neck stiffness.   Skin: Negative for color change and pallor.   Allergic/Immunologic: Positive for immunocompromised state.   Neurological: Negative for dizziness, seizures, weakness, light-headedness and headaches.   Psychiatric/Behavioral: Negative for behavioral problems, confusion, dysphoric mood, hallucinations, sleep disturbance and suicidal ideas. The patient is not nervous/anxious.      Objective:     Vital Signs (Most Recent):  Temp: 98.3 °F (36.8 °C) (03/31/18 0824)  Pulse: 74 (03/31/18 0823)  Resp: 18 (03/31/18 0412)  BP: (!) 158/79 (03/31/18 0823)  SpO2: 97 % (03/31/18 0823) Vital Signs (24h Range):  Temp:  [98.2 °F (36.8 °C)-99.2 °F (37.3 °C)] 98.3 °F (36.8 °C)  Pulse:  [] 74  Resp:  [15-20] 18  SpO2:  [95 %-99 %] 97 %  BP: (133-158)/(77-92) 158/79     Weight: 70.2 kg (154 lb 12.2 oz)  Body mass index is 24.99 kg/m².    Intake/Output - Last 3 Shifts       03/29 0700 - 03/30 0659 03/30 0700 - 03/31 0659 03/31 0700 - 04/01 0659    P.O. 700 660     Other 600      .3 109.6     Total Intake(mL/kg) 1889.3 (26.9) 769.6 (11)     Urine (mL/kg/hr) 2440 (1.4) 1850 (1.1)     Emesis/NG output 0 (0) 0 (0)     Other 600 (0.4)      Stool 0 (0) 0 (0)     Blood 0 (0)       Total Output 3040 1850      Net -1150.7 -1080.4             Urine Occurrence 0 x      Stool Occurrence 0 x 0 x     Emesis Occurrence 0 x 1 x           Physical Exam   Constitutional: He is oriented to person, place, and time. He appears well-developed.   Hand and temporal muscle wasting   HENT:   Head: Normocephalic.   Eyes: Pupils are equal, round, and reactive to light. No scleral icterus.   Cardiovascular: Normal rate, regular rhythm, normal heart sounds and intact distal pulses.    Pulmonary/Chest: Effort normal and breath sounds normal. No respiratory distress. He has no wheezes. He has no rhonchi. He has no rales.   Abdominal: Soft. Bowel sounds are normal. He exhibits distension. There is no tenderness.   Musculoskeletal: Normal range of motion. He exhibits edema (generalized).   Neurological: He is alert and oriented to person, place, and time.   Skin: Skin is warm and dry.   Psychiatric: He has a normal mood and affect. His behavior is normal. Judgment and thought content normal.   Nursing note and vitals reviewed.      Laboratory:  Immunosuppressants         Stop Route Frequency     mycophenolate capsule 500 mg      -- Oral 2 times daily     cycloSPORINE modified capsule 125 mg      -- Oral 2 times daily     cycloSPORINE modified (NEORAL) 25 MG capsule      03/09 1714       cycloSPORINE modified (NEORAL) 100 MG capsule      03/09 1714       cycloSPORINE modified (NEORAL) 100 MG capsule      03/09 0514       cycloSPORINE modified (NEORAL) 25 MG capsule      03/09 0514          CBC:     Recent Labs  Lab 03/31/18  0349   WBC 4.67   RBC 2.73*   HGB 8.8*   HCT 27.8*   PLT 92*   *   MCH 32.2*   MCHC 31.7*     CMP:     Recent Labs  Lab 03/31/18  0349   GLU 89   CALCIUM 9.4   ALBUMIN 2.9*   PROT 6.2      K 3.8   CO2 26      BUN 67*   CREATININE 3.1*   ALKPHOS 128   ALT 7*   AST 20   BILITOT 1.1*     Labs within the past 24 hours have been reviewed.    Diagnostic Results:  I have personally  reviewed all pertinent imaging studies.    Assessment/Plan:     Liver transplanted    - Post op course complicated by fevers and hyperbilirubinemia, AST/ALT stable.  - see biliary stricture of transplanted liver  - Liver biopsy 1/23 without rejection.  - Paracentesis 3/6 negative for infection, wbc 28 segs 3%.  - ERCP, EUS biopsy 3/9/18 - no rejection, mild cholestatic hepatitis seen.   - T bili normalized        Long-term use of immunosuppressant medication    - Maintenance IS with cyclosporine --> transitioned to Prograf 2/24 for continued nausea but did not tolerate from neuro standpoint.  PT IS NOT A CANDIDATE FOR TACROLIMUS.    - Transitioned back to cyclosporine 3/1  - D/c cyclosporine, started rapa & stress dose steroids 3/10 for continued confusion  - Mental status now improved, re-trial cyclosporine 3/13, d/c rapa 3/13  - Transition back to prednisone taper from stress dose steroids 3/15 --> will decrease by 5 mg q week.  - Restarted low dose MMF 3/23- with nausea today --> D/C Cellcept 3/31        Prophylactic immunotherapy    - See long term use of immunosuppression.         At risk for opportunistic infections    - see other cytomegaloviral disease.  - Pentam given 2/24. Plan to give next dose 3/27  - D/C Isavu 3/21.        Vitamin D deficiency    - Continue ergo 50K weekly.         Other cytomegaloviral diseases    - detected at 370 on CMV PCR 2/1.  - case discussed with ID and will hold off on treatment at this time given low WBC.   - undetected CMV PCR 2/8, 2/15, 2/22, 3/8   - continue Valcyte 200 mg every Mon, Wed, Fri.  - Per ID, valcyte dose decreased to ppx dose on 2/18--Valcyte d/c'd 2/25  - Repeat scope 3/6 to assess persistent N/V and possible CMV causing these symptoms. Biopsies thus far normal, cmv stains negative.  - continue to monitor weekly CMV PCR's, last PCR neg from 3/15, recent level from 3/22 undetected.  - CMV 3/29 undetected.        Constipation    - chronic  - encourage  ambulation, stool softeners and laxatives, often refuses some meds 2/2 nausea, will often require suppository or brown bomb enema to have a BM.  - Small bowel follow through with normal findings.  - Enema vs suppository today        Severe protein-calorie malnutrition    - Poor PO intake since transplant requiring short course of TPN during previous hospital stay.   - Due to N/V and abdominal pain, multiple attempts were made to place SHANE tube with tube feeding, but patient did not tolerate TF  - Transitioned to TPN 1/16 then again on 2/24 due to poor tolerance of SHANE tube/TF  - prealbumin 7 on 1/15 --> prealbumin 22 on 3/19, monitoring weekly (Mondays)  - EGD performed 1/25 and 3/6, gastroparesis, duodenal ulcer, and congested/erythematous gastric mucosa  - Small bowel through with normal findings.  - Consulted GI for GJ tube placement, but was canceled due to ongoing fevers  - Hold Reglan and Marinol 3/8 for neuro symptoms --> restarted reglan and marinol 3/19, will need to dc marinol before discharge because not covered by insurance.  - set goals for caloric intake with patient, continue nocturnal TPN started 3/20 PM to encourage appetite during the day  - Trying to set small caloric goals daily, for now goal is 300-500 jalen/day. Ultimately needs to consume 700 jalen/day to start weaning TPN.   - Ween TPN to half 3/30 --> back up to full 3/31 as not eating        Nausea and vomiting    - h/o constipation, abdominal pain, and N/V  - worsened with SHANE tube and TF --> now on TPN  - encourage bowel regimen  - EGD performed 1/25 and 3/6, gastroparesis, duodenal ulcer, and congested/erythematous gastric mucosa  - Small bowel through with normal findings  - Continue reglan and marinol scheduled, anti-emetics PRN.   - If cont with GI symptoms then may need to consider Flex sig/ C-scope to further eval.   - Abd xray with distended colon - enema 3/31  - D/C Cellcept        Biliary stricture of transplanted liver    - ERCP  12/6 with post-anastomosis stricture with stent placement.  - Tbili with increase prompting ERCP 1/30 with stones and sludge, stents placed.  - Repeat ERCP 2/19 as was having recurrent fevers with bilirubin stuck at 2.0-2.2 with overall unremarkable findings seen. Did place new larger stents.   - ERCP/EUS 3/9 with metal stent placed in bile duct, stones removed.  - Tbili remains stable.   - Continue actigall.        Anemia of chronic disease    - 1 U PRBC 3/12.  - Monitor daily labs. H/H stable, transfuse PRN.         Delayed surgical wound healing    - Wd vac removed 1/12/18.    - Wound healing well.   - Changing dressing q Tuesday, last 3/27.        Seizure    - 3/1, patient with stuttering speech, trouble recalling specific words when speaking. Does have hx of seizure (on keppra) previously while on prograf. Switched to prograf over the weekend from cyclosporine. Neuro exam unremarkable.   - CT head without contrast obtained, unremarkable.   - In light of these symptoms, prograf d/c, restart cyclosporine 3/1.  - remains w/o seizure. Cont to have stutter/garbled speech/difficulty finding words.  Worse post EGD as sedation was given.  - MRI 3/6 unremarkable.  - EEG completed 3/8 - mild, generalized, non-specific cerebral dysfunction, no epileptiform activity.  - monitor closely. VSS.        Acute renal failure with tubular necrosis    - HD resumed on previous admission. Anuric on admission and dialyzes M-W-F.  - Nephrology following.    - Goal is for kidney txp in future- currently not a candidate due to health status.  - urine output has improved since 3/16 --> 2.6 L 3/27!  - HD held 3/21 & 3/23  - Nephrology started torsemide 50 mg daily 3/24- continue for now for volume management  - BUN elevated though on TPN, HD 3/29/18   - Will plan HD twice weekly for now  - Hold torsemide for now            VTE Risk Mitigation         Ordered     heparin (porcine) injection 1,000 Units  As needed (PRN)     Route:   Intra-Catheter        03/12/18 1134     heparin (porcine) injection 5,000 Units  Every 8 hours     Route:  Subcutaneous        03/08/18 1622     Medium Risk of VTE  Once      01/11/18 0351     Place sequential compression device  Until discontinued      01/11/18 0351          The patients clinical status was discussed at multidisplinary rounds, involving transplant surgery, transplant medicine, pharmacy, nursing, nutrition, and social work    Discharge Planning:  Monitor kidney function --> may need outpt HD  Monitor HH needs vs rehab for deconditioned status      Geronimo Vera PA-C  Liver Transplant  [facility]

## 2018-03-31 NOTE — SUBJECTIVE & OBJECTIVE
Scheduled Meds:   bisacodyl  10 mg Oral Daily    bisacodyl  10 mg Rectal Once    cycloSPORINE modified  125 mg Oral BID    docusate sodium  100 mg Oral BID    dronabinol  2.5 mg Oral QHS    epoetin maurisio (PROCRIT) injection  12,600 Units Intravenous Every Mon, Wed, Fri    ergocalciferol  50,000 Units Oral Q7 Days    glycerin 99.5%  100 mL Rectal ED 1 Time    And    magnesium citrate  296 mL Rectal ED 1 Time    And    sodium chloride 0.9%  500 mL Rectal ED 1 Time    heparin (porcine)  5,000 Units Subcutaneous Q8H    levetiracetam oral soln  500 mg Oral BID    levothyroxine  75 mcg Oral Before breakfast    lipase-protease-amylase 24,000-76,000-120,000 units  2 capsule Oral Daily    metoclopramide HCl  10 mg Oral QID (AC & HS)    mycophenolate  500 mg Oral BID    omeprazole  40 mg Oral QAM    polyethylene glycol  17 g Oral BID    [START ON 4/1/2018] predniSONE  10 mg Oral Daily    psyllium husk (aspartame)  3.4 g Oral BID    ursodiol  300 mg Oral BID     Continuous Infusions:    PRN Meds:sodium chloride 0.9%, acetaminophen, albuterol-ipratropium 2.5mg-0.5mg/3mL, bisacodyl, butalbital-acetaminophen-caffeine -40 mg, dextrose 50%, dextrose 50%, diphenhydrAMINE-zinc acetate 1-0.1%, glucagon (human recombinant), glucose, glucose, heparin (porcine), naloxone, ondansetron, ondansetron, oxyCODONE, oxyCODONE, prochlorperazine, prochlorperazine, simethicone, sodium chloride 0.9%, sodium chloride 0.9%    Review of Systems   Constitutional: Positive for activity change, appetite change and fatigue. Negative for chills and fever.   HENT: Negative for congestion and facial swelling.    Eyes: Negative for pain, discharge and visual disturbance.   Respiratory: Negative.  Negative for cough, chest tightness, shortness of breath and wheezing.    Cardiovascular: Negative.  Negative for chest pain, palpitations and leg swelling.   Gastrointestinal: Positive for abdominal distention, abdominal pain, constipation,  nausea and vomiting. Negative for diarrhea.   Endocrine: Negative.    Genitourinary: Negative for decreased urine volume, difficulty urinating, dysuria, hematuria and urgency.   Musculoskeletal: Negative for back pain, gait problem, neck pain and neck stiffness.   Skin: Negative for color change and pallor.   Allergic/Immunologic: Positive for immunocompromised state.   Neurological: Negative for dizziness, seizures, weakness, light-headedness and headaches.   Psychiatric/Behavioral: Negative for behavioral problems, confusion, dysphoric mood, hallucinations, sleep disturbance and suicidal ideas. The patient is not nervous/anxious.      Objective:     Vital Signs (Most Recent):  Temp: 98.3 °F (36.8 °C) (03/31/18 0824)  Pulse: 74 (03/31/18 0823)  Resp: 18 (03/31/18 0412)  BP: (!) 158/79 (03/31/18 0823)  SpO2: 97 % (03/31/18 0823) Vital Signs (24h Range):  Temp:  [98.2 °F (36.8 °C)-99.2 °F (37.3 °C)] 98.3 °F (36.8 °C)  Pulse:  [] 74  Resp:  [15-20] 18  SpO2:  [95 %-99 %] 97 %  BP: (133-158)/(77-92) 158/79     Weight: 70.2 kg (154 lb 12.2 oz)  Body mass index is 24.99 kg/m².    Intake/Output - Last 3 Shifts       03/29 0700 - 03/30 0659 03/30 0700 - 03/31 0659 03/31 0700 - 04/01 0659    P.O. 700 660     Other 600      .3 109.6     Total Intake(mL/kg) 1889.3 (26.9) 769.6 (11)     Urine (mL/kg/hr) 2440 (1.4) 1850 (1.1)     Emesis/NG output 0 (0) 0 (0)     Other 600 (0.4)      Stool 0 (0) 0 (0)     Blood 0 (0)      Total Output 3040 1850      Net -1150.7 -1080.4             Urine Occurrence 0 x      Stool Occurrence 0 x 0 x     Emesis Occurrence 0 x 1 x           Physical Exam   Constitutional: He is oriented to person, place, and time. He appears well-developed.   Hand and temporal muscle wasting   HENT:   Head: Normocephalic.   Eyes: Pupils are equal, round, and reactive to light. No scleral icterus.   Cardiovascular: Normal rate, regular rhythm, normal heart sounds and intact distal pulses.     Pulmonary/Chest: Effort normal and breath sounds normal. No respiratory distress. He has no wheezes. He has no rhonchi. He has no rales.   Abdominal: Soft. Bowel sounds are normal. He exhibits distension. There is no tenderness.   Musculoskeletal: Normal range of motion. He exhibits edema (generalized).   Neurological: He is alert and oriented to person, place, and time.   Skin: Skin is warm and dry.   Psychiatric: He has a normal mood and affect. His behavior is normal. Judgment and thought content normal.   Nursing note and vitals reviewed.      Laboratory:  Immunosuppressants         Stop Route Frequency     mycophenolate capsule 500 mg      -- Oral 2 times daily     cycloSPORINE modified capsule 125 mg      -- Oral 2 times daily     cycloSPORINE modified (NEORAL) 25 MG capsule      03/09 1714       cycloSPORINE modified (NEORAL) 100 MG capsule      03/09 1714       cycloSPORINE modified (NEORAL) 100 MG capsule      03/09 0514       cycloSPORINE modified (NEORAL) 25 MG capsule      03/09 0514          CBC:     Recent Labs  Lab 03/31/18  0349   WBC 4.67   RBC 2.73*   HGB 8.8*   HCT 27.8*   PLT 92*   *   MCH 32.2*   MCHC 31.7*     CMP:     Recent Labs  Lab 03/31/18  0349   GLU 89   CALCIUM 9.4   ALBUMIN 2.9*   PROT 6.2      K 3.8   CO2 26      BUN 67*   CREATININE 3.1*   ALKPHOS 128   ALT 7*   AST 20   BILITOT 1.1*     Labs within the past 24 hours have been reviewed.    Diagnostic Results:  I have personally reviewed all pertinent imaging studies.

## 2018-03-31 NOTE — ASSESSMENT & PLAN NOTE
- chronic  - encourage ambulation, stool softeners and laxatives, often refuses some meds 2/2 nausea, will often require suppository or brown bomb enema to have a BM.  - Small bowel follow through with normal findings.  - Enema vs suppository today

## 2018-03-31 NOTE — PROGRESS NOTES
"Pt took PO medications only to throw them up minutes later.  Pt given IV Compazine, but states that he wants to forego taking any "non-essential" medications at this time.     Pt given CellCept and Keppra to take again, but did not attempt taking Reglan, Colace, Actigall, or Marinol again.  "

## 2018-03-31 NOTE — ASSESSMENT & PLAN NOTE
- HD resumed on previous admission. Anuric on admission and dialyzes M-W-F.  - Nephrology following.    - Goal is for kidney txp in future- currently not a candidate due to health status.  - urine output has improved since 3/16 --> 2.6 L 3/27!  - HD held 3/21 & 3/23  - Nephrology started torsemide 50 mg daily 3/24- continue for now for volume management  - BUN elevated though on TPN, HD 3/29/18   - Will plan HD twice weekly for now  - Hold torsemide for now

## 2018-03-31 NOTE — ASSESSMENT & PLAN NOTE
- detected at 370 on CMV PCR 2/1.  - case discussed with ID and will hold off on treatment at this time given low WBC.   - undetected CMV PCR 2/8, 2/15, 2/22, 3/8   - continue Valcyte 200 mg every Mon, Wed, Fri.  - Per ID, valcyte dose decreased to ppx dose on 2/18--Valcyte d/c'd 2/25  - Repeat scope 3/6 to assess persistent N/V and possible CMV causing these symptoms. Biopsies thus far normal, cmv stains negative.  - continue to monitor weekly CMV PCR's, last PCR neg from 3/15, recent level from 3/22 undetected.  - CMV 3/29 undetected.

## 2018-04-01 LAB
ALBUMIN SERPL BCP-MCNC: 2.9 G/DL
ALP SERPL-CCNC: 114 U/L
ALT SERPL W/O P-5'-P-CCNC: 8 U/L
ANION GAP SERPL CALC-SCNC: 14 MMOL/L
AST SERPL-CCNC: 19 U/L
BASOPHILS # BLD AUTO: 0.01 K/UL
BASOPHILS NFR BLD: 0.3 %
BILIRUB SERPL-MCNC: 1 MG/DL
BUN SERPL-MCNC: 75 MG/DL
CALCIUM SERPL-MCNC: 9.6 MG/DL
CHLORIDE SERPL-SCNC: 100 MMOL/L
CO2 SERPL-SCNC: 25 MMOL/L
CREAT SERPL-MCNC: 3.5 MG/DL
CYCLOSPORINE BLD LC/MS/MS-MCNC: 166 NG/ML
DIFFERENTIAL METHOD: ABNORMAL
EOSINOPHIL # BLD AUTO: 0.1 K/UL
EOSINOPHIL NFR BLD: 2 %
ERYTHROCYTE [DISTWIDTH] IN BLOOD BY AUTOMATED COUNT: 16.7 %
EST. GFR  (AFRICAN AMERICAN): 25.9 ML/MIN/1.73 M^2
EST. GFR  (NON AFRICAN AMERICAN): 22.4 ML/MIN/1.73 M^2
GLUCOSE SERPL-MCNC: 97 MG/DL
HCT VFR BLD AUTO: 25.2 %
HGB BLD-MCNC: 8.2 G/DL
IMM GRANULOCYTES # BLD AUTO: 0.06 K/UL
IMM GRANULOCYTES NFR BLD AUTO: 2 %
LYMPHOCYTES # BLD AUTO: 1.1 K/UL
LYMPHOCYTES NFR BLD: 35.1 %
MAGNESIUM SERPL-MCNC: 1.7 MG/DL
MCH RBC QN AUTO: 33.5 PG
MCHC RBC AUTO-ENTMCNC: 32.5 G/DL
MCV RBC AUTO: 103 FL
MONOCYTES # BLD AUTO: 0.4 K/UL
MONOCYTES NFR BLD: 12.5 %
NEUTROPHILS # BLD AUTO: 1.5 K/UL
NEUTROPHILS NFR BLD: 48.1 %
NRBC BLD-RTO: 0 /100 WBC
PHOSPHATE SERPL-MCNC: 5.4 MG/DL
PLATELET # BLD AUTO: 83 K/UL
PMV BLD AUTO: 12 FL
POTASSIUM SERPL-SCNC: 3.8 MMOL/L
PROT SERPL-MCNC: 6.1 G/DL
RBC # BLD AUTO: 2.45 M/UL
SODIUM SERPL-SCNC: 139 MMOL/L
WBC # BLD AUTO: 3.05 K/UL

## 2018-04-01 PROCEDURE — 25000003 PHARM REV CODE 250

## 2018-04-01 PROCEDURE — 94664 DEMO&/EVAL PT USE INHALER: CPT

## 2018-04-01 PROCEDURE — 25000003 PHARM REV CODE 250: Performed by: NURSE PRACTITIONER

## 2018-04-01 PROCEDURE — 80158 DRUG ASSAY CYCLOSPORINE: CPT

## 2018-04-01 PROCEDURE — A4217 STERILE WATER/SALINE, 500 ML: HCPCS | Performed by: PHYSICIAN ASSISTANT

## 2018-04-01 PROCEDURE — 25000003 PHARM REV CODE 250: Performed by: PHYSICIAN ASSISTANT

## 2018-04-01 PROCEDURE — 83735 ASSAY OF MAGNESIUM: CPT

## 2018-04-01 PROCEDURE — 80053 COMPREHEN METABOLIC PANEL: CPT

## 2018-04-01 PROCEDURE — 84100 ASSAY OF PHOSPHORUS: CPT

## 2018-04-01 PROCEDURE — 99233 SBSQ HOSP IP/OBS HIGH 50: CPT | Mod: ,,, | Performed by: PHYSICIAN ASSISTANT

## 2018-04-01 PROCEDURE — 94761 N-INVAS EAR/PLS OXIMETRY MLT: CPT

## 2018-04-01 PROCEDURE — 20600001 HC STEP DOWN PRIVATE ROOM

## 2018-04-01 PROCEDURE — 63600175 PHARM REV CODE 636 W HCPCS: Performed by: NURSE PRACTITIONER

## 2018-04-01 PROCEDURE — 63600175 PHARM REV CODE 636 W HCPCS: Performed by: PHYSICIAN ASSISTANT

## 2018-04-01 PROCEDURE — 25000003 PHARM REV CODE 250: Performed by: STUDENT IN AN ORGANIZED HEALTH CARE EDUCATION/TRAINING PROGRAM

## 2018-04-01 PROCEDURE — 85025 COMPLETE CBC W/AUTO DIFF WBC: CPT

## 2018-04-01 PROCEDURE — 63600175 PHARM REV CODE 636 W HCPCS: Performed by: STUDENT IN AN ORGANIZED HEALTH CARE EDUCATION/TRAINING PROGRAM

## 2018-04-01 RX ORDER — FENTANYL CITRATE 50 UG/ML
6.25 INJECTION, SOLUTION INTRAMUSCULAR; INTRAVENOUS ONCE
Status: COMPLETED | OUTPATIENT
Start: 2018-04-01 | End: 2018-04-01

## 2018-04-01 RX ORDER — FENTANYL CITRATE 50 UG/ML
INJECTION, SOLUTION INTRAMUSCULAR; INTRAVENOUS
Status: DISPENSED
Start: 2018-04-01 | End: 2018-04-02

## 2018-04-01 RX ADMIN — BISACODYL 10 MG: 10 SUPPOSITORY RECTAL at 12:04

## 2018-04-01 RX ADMIN — LEVETIRACETAM 500 MG: 100 SOLUTION ORAL at 10:04

## 2018-04-01 RX ADMIN — METOCLOPRAMIDE HYDROCHLORIDE 10 MG: 5 SOLUTION ORAL at 07:04

## 2018-04-01 RX ADMIN — PROCHLORPERAZINE EDISYLATE 10 MG: 5 INJECTION INTRAMUSCULAR; INTRAVENOUS at 02:04

## 2018-04-01 RX ADMIN — OXYCODONE HYDROCHLORIDE 10 MG: 5 TABLET ORAL at 07:04

## 2018-04-01 RX ADMIN — PROMETHAZINE HYDROCHLORIDE 12.5 MG: 25 INJECTION INTRAMUSCULAR; INTRAVENOUS at 04:04

## 2018-04-01 RX ADMIN — DOCUSATE SODIUM 100 MG: 100 CAPSULE, LIQUID FILLED ORAL at 10:04

## 2018-04-01 RX ADMIN — SIMETHICONE CHEW TAB 80 MG 80 MG: 80 TABLET ORAL at 12:04

## 2018-04-01 RX ADMIN — LEVETIRACETAM 500 MG: 100 SOLUTION ORAL at 08:04

## 2018-04-01 RX ADMIN — LEVOTHYROXINE SODIUM 75 MCG: 75 TABLET ORAL at 05:04

## 2018-04-01 RX ADMIN — BISACODYL 10 MG: 5 TABLET, COATED ORAL at 08:04

## 2018-04-01 RX ADMIN — MYCOPHENOLATE MOFETIL 500 MG: 250 CAPSULE ORAL at 08:04

## 2018-04-01 RX ADMIN — DOCUSATE SODIUM 100 MG: 100 CAPSULE, LIQUID FILLED ORAL at 08:04

## 2018-04-01 RX ADMIN — PANCRELIPASE 2 CAPSULE: 24000; 76000; 120000 CAPSULE, DELAYED RELEASE PELLETS ORAL at 08:04

## 2018-04-01 RX ADMIN — METOCLOPRAMIDE HYDROCHLORIDE 10 MG: 5 SOLUTION ORAL at 11:04

## 2018-04-01 RX ADMIN — FENTANYL CITRATE 6.25 MCG: 50 INJECTION, SOLUTION INTRAMUSCULAR; INTRAVENOUS at 06:04

## 2018-04-01 RX ADMIN — URSODIOL 300 MG: 300 CAPSULE ORAL at 10:04

## 2018-04-01 RX ADMIN — OMEPRAZOLE 40 MG: 40 CAPSULE, DELAYED RELEASE ORAL at 07:04

## 2018-04-01 RX ADMIN — OXYCODONE HYDROCHLORIDE 10 MG: 5 TABLET ORAL at 11:04

## 2018-04-01 RX ADMIN — DRONABINOL 2.5 MG: 2.5 CAPSULE ORAL at 10:04

## 2018-04-01 RX ADMIN — PROCHLORPERAZINE EDISYLATE 10 MG: 5 INJECTION INTRAMUSCULAR; INTRAVENOUS at 10:04

## 2018-04-01 RX ADMIN — METOCLOPRAMIDE HYDROCHLORIDE 10 MG: 5 SOLUTION ORAL at 10:04

## 2018-04-01 RX ADMIN — OXYCODONE HYDROCHLORIDE 10 MG: 5 TABLET ORAL at 03:04

## 2018-04-01 RX ADMIN — URSODIOL 300 MG: 300 CAPSULE ORAL at 08:04

## 2018-04-01 RX ADMIN — CYCLOSPORINE 125 MG: 100 CAPSULE, LIQUID FILLED ORAL at 08:04

## 2018-04-01 RX ADMIN — PREDNISONE 10 MG: 10 TABLET ORAL at 08:04

## 2018-04-01 RX ADMIN — RETINOL, ERGOCALCIFEROL, .ALPHA.-TOCOPHEROL ACETATE, DL-, PHYTONADIONE, ASCORBIC ACID, NIACINAMIDE, RIBOFLAVIN 5-PHOSPHATE SODIUM, THIAMINE HYDROCHLORIDE, PYRIDOXINE HYDROCHLORIDE, DEXPANTHENOL, BIOTIN, FOLIC ACID, AND CYANOCOBALAMIN: KIT at 10:04

## 2018-04-01 RX ADMIN — CYCLOSPORINE 125 MG: 100 CAPSULE, LIQUID FILLED ORAL at 06:04

## 2018-04-01 NOTE — PROGRESS NOTES
Dr. Cheung reviewed x-ray, which showed improvement in gas compared to yesterday.   Patient able to rest after IV phenergan administration. Patient woke up and continues to complain of abdominal pain. PRN oxycodone admin at 1545 with mild relief. MD to review orders.   Will continue to monitor.

## 2018-04-01 NOTE — PROGRESS NOTES
Called x-ray department to inquire when STAT abdominal x-ray would be taken.   X-ray stated they are unable to do a flat and erect abdominal x-ray portable.   Patient continues to complain of severe abdominal pain and refusing to go to radiology for imaging.   Placed order for portable abdominal x-ray, MD aware.   Will continue to monitor.

## 2018-04-01 NOTE — PROGRESS NOTES
Dr. Cheung stated OK for patient to receive PRN oxycodone 10 mg now.   Patient refused PO pain medication stating he is in too much pain to take anything by mouth.   Patient reminded that he received IV phenergan and compazine for nausea, which should help him tolerate PO medications.   Patient asked me to call the doctor for IV pain medication. Dr. Cheung

## 2018-04-01 NOTE — PROGRESS NOTES
Spoke with Dr. Cheung regarding the following:  Patient crying in severe pain, stated he is unable to go down for xray.   MD stated OK to change abdominal x-ray to portable.   MD would like to review x-ray before addressing pain at this time, will order phenergan.

## 2018-04-01 NOTE — PLAN OF CARE
Problem: Patient Care Overview  Goal: Plan of Care Review  Outcome: Ongoing (interventions implemented as appropriate)  Pt AAOx4, VSS, afebrile.  C/o back pain with relief to prn oxy IR 10mg.  TPN infusing 45cc/hr Lipids infusing 20.8cc/hr.  Pt mother remain at bedside.  Pt ambulated off unit with mother during shift.  150cc urine measured per mother 0 BM.  Fall risk precautions initiated.  Pt in lowest bed position setting, lighting adjusted, pt to wear nonskid socks when ambulating, side rails up x2.  Pt remain free from falls during shift.  Pt verbalize understanding to call when needed assistance.  Call light within reach.  Will continue to monitor.

## 2018-04-01 NOTE — ASSESSMENT & PLAN NOTE
- HD resumed on previous admission. Anuric on admission and dialyzes M-W-F.  - Nephrology following.    - Goal is for kidney txp in future- currently not a candidate due to health status.  - urine output has improved since 3/16 --> 2.6 L 3/27!  - HD held 3/21 & 3/23  - Nephrology started torsemide 50 mg daily 3/24- continue for now for volume management  - BUN elevated though on TPN, HD 3/29/18   - Will plan HD twice weekly for now  - Hold torsemide for now (d/roselia on 3/31)

## 2018-04-01 NOTE — ASSESSMENT & PLAN NOTE
- h/o constipation, abdominal pain, and N/V  - worsened with SHANE tube and TF --> now on TPN  - encourage bowel regimen  - EGD performed 1/25 and 3/6, gastroparesis, duodenal ulcer, and congested/erythematous gastric mucosa  - Small bowel through with normal findings  - Continue reglan and marinol scheduled, anti-emetics PRN.   - If cont with GI symptoms then may need to consider Flex sig/ C-scope to further eval.   - Abd xray with distended colon - did not want enema  - D/C Cellcept 3/31  - Improved today

## 2018-04-01 NOTE — PROGRESS NOTES
"Notified Dr. Cheung of the following:  Patient with gas pains this afternoon, administered simethicone with no relief. Administered PRN suppository, patient reported passing "alot" of gas. Patient now complaining of severe abdominal pain associated with nausea and vomiting. Patient stated his nausea is bearable, but the pain is not.   MD to update orders for pain control.   Will continue to monitor.       "

## 2018-04-01 NOTE — ASSESSMENT & PLAN NOTE
- 3/1, patient with stuttering speech, trouble recalling specific words when speaking. Does have hx of seizure (on keppra) previously while on prograf. Switched to prograf over the weekend from cyclosporine. Neuro exam unremarkable.   - CT head without contrast obtained, unremarkable.   - In light of these symptoms, prograf d/c, restart cyclosporine 3/1.  - remains w/o seizure. Cont to have stutter/garbled speech/difficulty finding words.  Worse post EGD as sedation was given.  - MRI 3/6 unremarkable.  - EEG completed 3/8 - mild, generalized, non-specific cerebral dysfunction, no epileptiform activity.  - monitor closely. VSS.  - Should d/w neuro this week decreasing Keppra

## 2018-04-01 NOTE — ASSESSMENT & PLAN NOTE
- Poor PO intake since transplant requiring short course of TPN during previous hospital stay.   - Due to N/V and abdominal pain, multiple attempts were made to place SHANE tube with tube feeding, but patient did not tolerate TF  - Transitioned to TPN 1/16 then again on 2/24 due to poor tolerance of SHANE tube/TF  - prealbumin 7 on 1/15 --> prealbumin 22 on 3/19, monitoring weekly (Mondays)  - EGD performed 1/25 and 3/6, gastroparesis, duodenal ulcer, and congested/erythematous gastric mucosa  - Small bowel through with normal findings.  - Consulted GI for GJ tube placement, but was canceled due to ongoing fevers  - Hold Reglan and Marinol 3/8 for neuro symptoms --> restarted reglan and marinol 3/19, will need to dc marinol before discharge because not covered by insurance.  - set goals for caloric intake with patient, continue nocturnal TPN started 3/20 PM to encourage appetite during the day  - Trying to set small caloric goals daily, for now goal is 300-500 jalen/day. Ultimately needs to consume 700 jalen/day to start weaning TPN.   - Ween TPN to half 3/30 --> back up to full 3/31 as not eating -> decrease again since ate 3/31

## 2018-04-01 NOTE — SUBJECTIVE & OBJECTIVE
Scheduled Meds:   bisacodyl  10 mg Oral Daily    bisacodyl  10 mg Rectal Once    cycloSPORINE modified  125 mg Oral BID    docusate sodium  100 mg Oral BID    dronabinol  2.5 mg Oral QHS    epoetin maurisio (PROCRIT) injection  12,600 Units Intravenous Every Mon, Wed, Fri    ergocalciferol  50,000 Units Oral Q7 Days    fat emulsion 20%  250 mL Intravenous Once    heparin (porcine)  5,000 Units Subcutaneous Q8H    levetiracetam oral soln  500 mg Oral BID    levothyroxine  75 mcg Oral Before breakfast    lipase-protease-amylase 24,000-76,000-120,000 units  2 capsule Oral Daily    metoclopramide HCl  10 mg Oral QID (AC & HS)    mycophenolate  500 mg Oral BID    omeprazole  40 mg Oral QAM    polyethylene glycol  17 g Oral BID    predniSONE  10 mg Oral Daily    psyllium husk (aspartame)  3.4 g Oral BID    ursodiol  300 mg Oral BID     Continuous Infusions:   TPN ADULT CENTRAL LINE CUSTOM 45 mL/hr at 03/31/18 2202     PRN Meds:sodium chloride 0.9%, acetaminophen, albuterol-ipratropium 2.5mg-0.5mg/3mL, bisacodyl, butalbital-acetaminophen-caffeine -40 mg, dextrose 50%, dextrose 50%, diphenhydrAMINE-zinc acetate 1-0.1%, glucagon (human recombinant), glucose, glucose, heparin (porcine), naloxone, ondansetron, ondansetron, oxyCODONE, oxyCODONE, prochlorperazine, prochlorperazine, simethicone, sodium chloride 0.9%, sodium chloride 0.9%    Review of Systems   Constitutional: Positive for activity change, appetite change and fatigue. Negative for chills and fever.   HENT: Negative for congestion and facial swelling.    Eyes: Negative for pain, discharge and visual disturbance.   Respiratory: Negative.  Negative for cough, chest tightness, shortness of breath and wheezing.    Cardiovascular: Negative.  Negative for chest pain, palpitations and leg swelling.   Gastrointestinal: Positive for abdominal distention and abdominal pain. Negative for constipation, diarrhea, nausea and vomiting.   Endocrine: Negative.     Genitourinary: Negative for decreased urine volume, difficulty urinating, dysuria, hematuria and urgency.   Musculoskeletal: Negative for back pain, gait problem, neck pain and neck stiffness.   Skin: Negative for color change and pallor.   Allergic/Immunologic: Positive for immunocompromised state.   Neurological: Negative for dizziness, seizures, weakness, light-headedness and headaches.   Psychiatric/Behavioral: Negative for behavioral problems, confusion, dysphoric mood, hallucinations, sleep disturbance and suicidal ideas. The patient is not nervous/anxious.      Objective:     Vital Signs (Most Recent):  Temp: 98.2 °F (36.8 °C) (04/01/18 0729)  Pulse: 74 (04/01/18 0729)  Resp: 16 (04/01/18 0729)  BP: (!) 141/87 (04/01/18 0729)  SpO2: 96 % (04/01/18 0729) Vital Signs (24h Range):  Temp:  [97.2 °F (36.2 °C)-98.9 °F (37.2 °C)] 98.2 °F (36.8 °C)  Pulse:  [68-92] 74  Resp:  [15-18] 16  SpO2:  [94 %-99 %] 96 %  BP: (137-144)/(79-87) 141/87     Weight: 63.5 kg (139 lb 15.9 oz)  Body mass index is 22.61 kg/m².    Intake/Output - Last 3 Shifts       03/30 0700 - 03/31 0659 03/31 0700 - 04/01 0659 04/01 0700 - 04/02 0659    P.O. 660 550     I.V. (mL/kg)  20 (0.3)     Other  0     .6 518.5     Total Intake(mL/kg) 769.6 (11) 1088.5 (17.1)     Urine (mL/kg/hr) 1850 (1.1) 1150 (0.8) 400 (2.9)    Emesis/NG output 0 (0) 0 (0)     Other  0 (0)     Stool 0 (0) 0 (0)     Blood  0 (0)     Total Output 1850 1150 400    Net -1080.4 -61.5 -400           Urine Occurrence  0 x     Stool Occurrence 1 x 0 x     Emesis Occurrence 1 x 0 x           Physical Exam   Constitutional: He is oriented to person, place, and time. He appears well-developed.   Hand and temporal muscle wasting   HENT:   Head: Normocephalic.   Eyes: Pupils are equal, round, and reactive to light. No scleral icterus.   Cardiovascular: Normal rate, regular rhythm, normal heart sounds and intact distal pulses.    Pulmonary/Chest: Effort normal and breath  sounds normal. No respiratory distress. He has no wheezes. He has no rhonchi. He has no rales.   Abdominal: Soft. Bowel sounds are normal. He exhibits distension. There is no tenderness.   Musculoskeletal: Normal range of motion. He exhibits edema (generalized).   Neurological: He is alert and oriented to person, place, and time.   Skin: Skin is warm and dry.   Psychiatric: He has a normal mood and affect. His behavior is normal. Judgment and thought content normal.   Nursing note and vitals reviewed.      Laboratory:  Immunosuppressants         Stop Route Frequency     mycophenolate capsule 500 mg      -- Oral 2 times daily     cycloSPORINE modified capsule 125 mg      -- Oral 2 times daily     cycloSPORINE modified (NEORAL) 25 MG capsule      03/09 1714       cycloSPORINE modified (NEORAL) 100 MG capsule      03/09 1714       cycloSPORINE modified (NEORAL) 100 MG capsule      03/09 0514       cycloSPORINE modified (NEORAL) 25 MG capsule      03/09 0514          CBC:     Recent Labs  Lab 04/01/18  0545   WBC 3.05*   RBC 2.45*   HGB 8.2*   HCT 25.2*   PLT 83*   *   MCH 33.5*   MCHC 32.5     CMP:     Recent Labs  Lab 04/01/18  0545   GLU 97   CALCIUM 9.6   ALBUMIN 2.9*   PROT 6.1      K 3.8   CO2 25      BUN 75*   CREATININE 3.5*   ALKPHOS 114   ALT 8*   AST 19   BILITOT 1.0     Labs within the past 24 hours have been reviewed.    Diagnostic Results:  I have personally reviewed all pertinent imaging studies.

## 2018-04-01 NOTE — ASSESSMENT & PLAN NOTE
- Maintenance IS with cyclosporine --> transitioned to Prograf 2/24 for continued nausea but did not tolerate from neuro standpoint.  PT IS NOT A CANDIDATE FOR TACROLIMUS.    - Transitioned back to cyclosporine 3/1  - D/c cyclosporine, started rapa & stress dose steroids 3/10 for continued confusion  - Mental status now improved, re-trial cyclosporine 3/13, d/c rapa 3/13  - Transition back to prednisone taper from stress dose steroids 3/15 --> will decrease by 5 mg q week (decrease to 5 mg 4/6).  - Restarted low dose MMF 3/23- with nausea --> D/C Cellcept 3/31 --> nausea improved --> continue to hold as has passed protocol for Cellcept

## 2018-04-01 NOTE — PLAN OF CARE
Problem: Patient Care Overview  Goal: Plan of Care Review  Patient is AAOx4. Patient educated to use call bell for assistance, verbalized understanding.   Patient ambulates with standby assistance, remains free from falls or injury so far this shift.   Patient's mother at bedside, attentive to patient's needs.   Afebrile, WBC 3.05.   R abdomen hydrocolloid dressing CDI - dressing change due 4/3/18.   Patient with little to no appetite today, ate 25% bowl of fruit loops for breakfast. Patient receiving continuous TPN at 45 cc/hr. Plan to switch to nocturnal TPN tonight.   Patient received simethicone for gas with no relief reported. Patient received bisacodyl suppository and reported relief of gas pain, but worsening abdominal pain. Patient requesting PRN oxycodone q 4 hours. Flat and erect abdominal xray ordered this afternoon. Patient reported 1 bowel movement this afternoon.   Patient with 1 episode of nausea and emesis associated with increased abdominal pain.   Last HD 3/29/18, CR 3.5, urine output 400 cc.   See flow sheet for complete assessment details.

## 2018-04-02 LAB
ALBUMIN SERPL BCP-MCNC: 2.8 G/DL
ALP SERPL-CCNC: 111 U/L
ALT SERPL W/O P-5'-P-CCNC: 9 U/L
ANION GAP SERPL CALC-SCNC: 12 MMOL/L
AST SERPL-CCNC: 19 U/L
BASOPHILS # BLD AUTO: 0.01 K/UL
BASOPHILS NFR BLD: 0.3 %
BILIRUB SERPL-MCNC: 1.1 MG/DL
BILIRUB UR QL STRIP: NEGATIVE
BUN SERPL-MCNC: 80 MG/DL
CALCIUM SERPL-MCNC: 9.3 MG/DL
CHLORIDE SERPL-SCNC: 101 MMOL/L
CLARITY UR REFRACT.AUTO: CLEAR
CO2 SERPL-SCNC: 25 MMOL/L
COLOR UR AUTO: YELLOW
CREAT SERPL-MCNC: 3.7 MG/DL
CYCLOSPORINE BLD LC/MS/MS-MCNC: 209 NG/ML
DIFFERENTIAL METHOD: ABNORMAL
EOSINOPHIL # BLD AUTO: 0.1 K/UL
EOSINOPHIL NFR BLD: 2 %
ERYTHROCYTE [DISTWIDTH] IN BLOOD BY AUTOMATED COUNT: 16.3 %
EST. GFR  (AFRICAN AMERICAN): 24.2 ML/MIN/1.73 M^2
EST. GFR  (NON AFRICAN AMERICAN): 21 ML/MIN/1.73 M^2
GLUCOSE SERPL-MCNC: 93 MG/DL
GLUCOSE UR QL STRIP: NEGATIVE
HCT VFR BLD AUTO: 27.1 %
HGB BLD-MCNC: 8.5 G/DL
HGB UR QL STRIP: NEGATIVE
IMM GRANULOCYTES # BLD AUTO: 0.02 K/UL
IMM GRANULOCYTES NFR BLD AUTO: 0.7 %
KETONES UR QL STRIP: NEGATIVE
LEUKOCYTE ESTERASE UR QL STRIP: NEGATIVE
LYMPHOCYTES # BLD AUTO: 1 K/UL
LYMPHOCYTES NFR BLD: 31.9 %
MAGNESIUM SERPL-MCNC: 1.6 MG/DL
MCH RBC QN AUTO: 31.8 PG
MCHC RBC AUTO-ENTMCNC: 31.4 G/DL
MCV RBC AUTO: 102 FL
MONOCYTES # BLD AUTO: 0.5 K/UL
MONOCYTES NFR BLD: 16.6 %
NEUTROPHILS # BLD AUTO: 1.5 K/UL
NEUTROPHILS NFR BLD: 48.5 %
NITRITE UR QL STRIP: NEGATIVE
NRBC BLD-RTO: 0 /100 WBC
PH UR STRIP: 7 [PH] (ref 5–8)
PHOSPHATE SERPL-MCNC: 6 MG/DL
PLATELET # BLD AUTO: 81 K/UL
PMV BLD AUTO: 12.3 FL
POTASSIUM SERPL-SCNC: 3.9 MMOL/L
PREALB SERPL-MCNC: 23 MG/DL
PROT SERPL-MCNC: 6 G/DL
PROT UR QL STRIP: NEGATIVE
RBC # BLD AUTO: 2.67 M/UL
SODIUM SERPL-SCNC: 138 MMOL/L
SP GR UR STRIP: 1.01 (ref 1–1.03)
URN SPEC COLLECT METH UR: NORMAL
UROBILINOGEN UR STRIP-ACNC: NEGATIVE EU/DL
WBC # BLD AUTO: 3.01 K/UL

## 2018-04-02 PROCEDURE — 81003 URINALYSIS AUTO W/O SCOPE: CPT

## 2018-04-02 PROCEDURE — 20600001 HC STEP DOWN PRIVATE ROOM

## 2018-04-02 PROCEDURE — 97803 MED NUTRITION INDIV SUBSEQ: CPT | Performed by: DIETITIAN, REGISTERED

## 2018-04-02 PROCEDURE — 80158 DRUG ASSAY CYCLOSPORINE: CPT

## 2018-04-02 PROCEDURE — 25000003 PHARM REV CODE 250

## 2018-04-02 PROCEDURE — B4185 PARENTERAL SOL 10 GM LIPIDS: HCPCS | Performed by: PHYSICIAN ASSISTANT

## 2018-04-02 PROCEDURE — 63600175 PHARM REV CODE 636 W HCPCS: Performed by: NURSE PRACTITIONER

## 2018-04-02 PROCEDURE — 87040 BLOOD CULTURE FOR BACTERIA: CPT | Mod: 59

## 2018-04-02 PROCEDURE — 84134 ASSAY OF PREALBUMIN: CPT

## 2018-04-02 PROCEDURE — 83735 ASSAY OF MAGNESIUM: CPT

## 2018-04-02 PROCEDURE — 63600175 PHARM REV CODE 636 W HCPCS: Performed by: PHYSICIAN ASSISTANT

## 2018-04-02 PROCEDURE — A4217 STERILE WATER/SALINE, 500 ML: HCPCS | Performed by: PHYSICIAN ASSISTANT

## 2018-04-02 PROCEDURE — 63600175 PHARM REV CODE 636 W HCPCS: Mod: JG | Performed by: NURSE PRACTITIONER

## 2018-04-02 PROCEDURE — 85025 COMPLETE CBC W/AUTO DIFF WBC: CPT

## 2018-04-02 PROCEDURE — 25000003 PHARM REV CODE 250: Performed by: NURSE PRACTITIONER

## 2018-04-02 PROCEDURE — 25000003 PHARM REV CODE 250: Performed by: PHYSICIAN ASSISTANT

## 2018-04-02 PROCEDURE — 80053 COMPREHEN METABOLIC PANEL: CPT

## 2018-04-02 PROCEDURE — 87086 URINE CULTURE/COLONY COUNT: CPT

## 2018-04-02 PROCEDURE — 84100 ASSAY OF PHOSPHORUS: CPT

## 2018-04-02 RX ADMIN — METOCLOPRAMIDE HYDROCHLORIDE 10 MG: 5 SOLUTION ORAL at 08:04

## 2018-04-02 RX ADMIN — BISACODYL 10 MG: 5 TABLET, COATED ORAL at 09:04

## 2018-04-02 RX ADMIN — PROCHLORPERAZINE EDISYLATE 10 MG: 5 INJECTION INTRAMUSCULAR; INTRAVENOUS at 12:04

## 2018-04-02 RX ADMIN — LEVETIRACETAM 500 MG: 100 SOLUTION ORAL at 08:04

## 2018-04-02 RX ADMIN — ACETAMINOPHEN 650 MG: 325 TABLET, FILM COATED ORAL at 05:04

## 2018-04-02 RX ADMIN — LEVETIRACETAM 500 MG: 100 SOLUTION ORAL at 09:04

## 2018-04-02 RX ADMIN — URSODIOL 300 MG: 300 CAPSULE ORAL at 09:04

## 2018-04-02 RX ADMIN — LEVOTHYROXINE SODIUM 75 MCG: 75 TABLET ORAL at 05:04

## 2018-04-02 RX ADMIN — CYCLOSPORINE 125 MG: 100 CAPSULE, LIQUID FILLED ORAL at 09:04

## 2018-04-02 RX ADMIN — OXYCODONE HYDROCHLORIDE 10 MG: 5 TABLET ORAL at 11:04

## 2018-04-02 RX ADMIN — METOCLOPRAMIDE HYDROCHLORIDE 10 MG: 5 SOLUTION ORAL at 12:04

## 2018-04-02 RX ADMIN — OXYCODONE HYDROCHLORIDE 10 MG: 5 TABLET ORAL at 08:04

## 2018-04-02 RX ADMIN — CYCLOSPORINE 125 MG: 100 CAPSULE, LIQUID FILLED ORAL at 05:04

## 2018-04-02 RX ADMIN — PANCRELIPASE 2 CAPSULE: 24000; 76000; 120000 CAPSULE, DELAYED RELEASE PELLETS ORAL at 09:04

## 2018-04-02 RX ADMIN — OXYCODONE HYDROCHLORIDE 5 MG: 5 TABLET ORAL at 10:04

## 2018-04-02 RX ADMIN — MAGNESIUM SULFATE HEPTAHYDRATE: 500 INJECTION, SOLUTION INTRAMUSCULAR; INTRAVENOUS at 10:04

## 2018-04-02 RX ADMIN — OMEPRAZOLE 40 MG: 40 CAPSULE, DELAYED RELEASE ORAL at 07:04

## 2018-04-02 RX ADMIN — OXYCODONE HYDROCHLORIDE 10 MG: 5 TABLET ORAL at 04:04

## 2018-04-02 RX ADMIN — PREDNISONE 10 MG: 10 TABLET ORAL at 09:04

## 2018-04-02 RX ADMIN — URSODIOL 300 MG: 300 CAPSULE ORAL at 08:04

## 2018-04-02 RX ADMIN — DRONABINOL 2.5 MG: 2.5 CAPSULE ORAL at 08:04

## 2018-04-02 RX ADMIN — SOYBEAN OIL 250 ML: 20 INJECTION, SOLUTION INTRAVENOUS at 10:04

## 2018-04-02 RX ADMIN — DOCUSATE SODIUM 100 MG: 100 CAPSULE, LIQUID FILLED ORAL at 09:04

## 2018-04-02 RX ADMIN — METOCLOPRAMIDE HYDROCHLORIDE 10 MG: 5 SOLUTION ORAL at 09:04

## 2018-04-02 RX ADMIN — DOCUSATE SODIUM 100 MG: 100 CAPSULE, LIQUID FILLED ORAL at 08:04

## 2018-04-02 RX ADMIN — METOCLOPRAMIDE HYDROCHLORIDE 10 MG: 5 SOLUTION ORAL at 05:04

## 2018-04-02 NOTE — PLAN OF CARE
Problem: Patient Care Overview  Goal: Plan of Care Review  Outcome: Ongoing (interventions implemented as appropriate)  Pt AAOx4, VSS, tmax 99.  C/o abd pain with relief to prn oxy IR 10mg.  TPN infusing 38cc/hr.  Pt mother remain at bedside.  Pt ambulated off unit with mother during shift.  500cc urine measured per mother 0 BM.  Fall risk precautions initiated.  Pt in lowest bed position setting, lighting adjusted, pt to wear nonskid socks when ambulating, side rails up x2.  Pt remain free from falls during shift.  Pt verbalize understanding to call when needed assistance.  Call light within reach.  Will continue to monitor.

## 2018-04-02 NOTE — PROGRESS NOTES
"Attempted to give the patient his scheduled Procrit, patient states "I will only take the Procrit if I can get fentanyl". "The procrit makes my back hurt and the oxy does not help, it has to be the fentanyl". Geronimo ABARCA notified and reports the patient cannot have fentanyl. Patient aware and refused the Procrit at this time.   "

## 2018-04-02 NOTE — PROGRESS NOTES
[facility]  Adult Nutrition  Progress Note    SUMMARY       Recommendations    Recommendation/Intervention: Calorie count results: 3/29: 75kcal, 3/30: 0kcal, 3/31: 600kcal, 4/1: 0kcal.     Encourage transition to tube feeding to meet >75% EEN/EPN.     If TPN to be continued, recommend switching back to nocturnal TPN to meet % EEN/EPN while encouraging intake during the day. PO intake is inconsistent and remains inadequate.    Recommend custom TPN of 80g AA / 300g dex plus IV lipids nightly, fluid and  electrolytes per pharmacy. This would meet 85% EPN/90% EEN. Check triglycerides  weekly. If over 400mg/dL, modify lipids to q 48hours.     Once PO intake improves to meet 700kcal/day, wean TPN to meet 50% of needs - 40g AA / 170g dex plus IV lipids q 48hours.       RD following.   Goals: 1. 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, complicate post-op w/ LAURA ongoing, chronic malnutrition, cirrhosis 2' EtOH  Interdisciplinary Rounds: attended  General Information Comments: TPN on and off in past several days, now running continuous but only meeting 50% EEN/EPN, pt and mother feel patient eats better with nocturnal TF, pt with significant abd pain yesterday so ate very little, better today will drink Icee  Nutrition Discharge Planning: Adequate PO intake    Nutrition Risk Screen    Nutrition Risk Screen: large or nonhealing wound, burn or pressure ulcer    Nutrition/Diet History    Patient Reported Diet/Restrictions/Preferences: low salt  Typical Food/Fluid Intake: eats very little at this time  Food Preferences: No cultural or Voodoo food preferences noted  Do you have any cultural, spiritual, Voodoo conflicts, given your current  "situation?: no  (Retired) Meal/Snack Patterns: pt likes to cook, when feeling well will cook and then only eat bites  Supplemental Drinks or Food Habits:  (protein powders sometimes)  (RETIRED) Functional Status: able to prepare meals, able to purchase food, ambulatory  Factors Affecting Nutritional Intake: altered gastrointestinal function, abdominal distention, decreased appetite, nausea/vomiting    Anthropometrics    Temp: 98.8 °F (37.1 °C)  Height Method: Stated  Height: 5' 5.98" (167.6 cm)  Height (inches): 65.98 in  Weight Method: Bed Scale  Weight: 61 kg (134 lb 7.7 oz)  Weight (lb): 134.48 lb  Ideal Body Weight (IBW), Male: 141.88 lb  % Ideal Body Weight, Male (lb): 112.68 lb  BMI (Calculated): 25.9  BMI Grade: 25 - 29.9 - overweight  Weight Loss: unintentional  Usual Body Weight (UBW), k kg (2017 )  % Usual Body Weight: 86.71  % Weight Change From Usual Weight: -13.47 %    Anthropometrics Special Consideration         Lab/Procedures/Meds    (RETIRED) Diagnostic Test/Procedure Review: reviewed, pertinent  Pertinent Labs Reviewed: reviewed  Pertinent Labs Comments: BUN 80, Cr 3.7, GFR 21, P 6, TBili 1.1  Pertinent Medications Reviewed: reviewed  Pertinent Medications Comments: bisacodyl, docusate, ergocalciferol, phenergan, epoetin, metoclopramide, prednisone    Physical Findings/Assessment    Overall Physical Appearance: generalized wasting, loss of muscle mass, loss of subcutaneous fat, weak  Tubes:  (-)  Oral/Mouth Cavity: WDL  Skin: intact    Estimated/Assessed Needs    Weight Used For Calorie Calculations: 70.2 kg (154 lb 12.2 oz)  Height: 5' 5.98" (167.6 cm)  Energy Calorie Requirements (kcal): 9098-3145 (30-35 kcal/kg)  Energy Need Method: Kcal/kg  20 kcal/kg (kcal): 1404  25 kcal/kg (kcal): 1755  30 kcal/kg (kcal): 2106  35 kcal/kg (kcal): 2457  40 kcal/kg (kcal): 2808  45 kcal/kg (kcal): 3159  50 kcal/kg (kcal): 3510  RMR (Laurens-St. Jeor Equation): 1614.5  Protein Requirements: 81-94g " (1.3-1.5 g/kg)  Weight Used For Protein Calculations: 62.3 kg (137 lb 5.6 oz)  0.6 gm Protein (gm): 37.46  0.7 gm Protein (gm): 43.7  0.8 gm Protein (gm): 49.94  0.9 gm Protein (gm): 56.19  1.0 gm Protein (gm): 62.43  1.1 gm Protein (gm): 68.67  1.2 gm Protein (gm): 74.92  1.3 gm Protein (gm): 81.16  1.4 gm Protein (gm): 87.4  1.5 gm Protein (gm): 93.65  1.6 gm Protein (gm): 99.89  1.7 gm Protein (gm): 106.13  1.8 gm Protein (gm): 112.37  1.9 gm Protein (gm): 118.62  2.0 gm Protein (gm): 124.86  2.1 gm Protein (gm): 131.1  2.2 gm Protein (gm): 137.35  2.3 gm Protein (gm): 143.59  2.4 gm Protein (gm): 149.83  2.5 gm Protein (gm): 156.08  Fluid Requirements (mL): 1mL/kcal  Fluid Need Method: RDA Method  RDA Method (mL): 1869       Nutrition Prescription Ordered    Current Diet Order: Regular  Nutrition Order Comments: -  Current Nutrition Support Formula Ordered:  (Custom 35g AA / 100 g dex )  Current Nutrition Support Rate Ordered: 38 (ml)  Current Nutrition Support Frequency Ordered: mL/hr  Oral Nutrition Supplement: Boost Breeze    Evaluation of Received Nutrient/Fluid Intake    Enteral Calories (kcal): 0  Enteral Protein (gm): 0  Enteral (Free Water) Fluid (mL): 0  Parenteral Calories (kcal): 480  Parenteral Protein (gm): 35  Parenteral Fluid (mL): 912  Lipid Calories (kcals): 500 kcals  GIR (Glucose Infusion Rate) (mg/kg/min): 1.13 mg/kg/min  Total Calories (kcal): 0  Total Calories (kcal/kg): 980  % Kcal Needs: 50  % Protein Needs: 39  I/O: -6L since admit  Energy Calories Required: meeting needs  Protein Required: meeting needs  Fluid Required: meeting needs  Comments: LBM 4/1  Tolerance: not tolerating  % Intake of Estimated Energy Needs: 50 - 75 %  % Meal Intake: 25 - 50 %    Nutrition Risk    Level of Risk/Frequency of Follow-up:  (2x/week)     Assessment and Plan    Severe protein-calorie malnutrition    Nutrition Diagnosis  Inadequate oral intake    Related to (etiology):   Decreased appetite, nausea with  eating    Signs and Symptoms (as evidenced by):   Pt eating 0%-50% of meals and relying on TPN nutrition    Interventions/Recommendations (treatment strategy):  See recs    Nutrition Diagnosis Status:   Continues             Monitor and Evaluation    Food and Nutrient Intake: energy intake, food and beverage intake  Food and Nutrient Adminstration: diet order  Knowledge/Beliefs/Attitudes: food and nutrition knowledge/skill  Physical Activity and Function: nutrition-related ADLs and IADLs  Anthropometric Measurements: weight, weight change, body mass index  Biochemical Data, Medical Tests and Procedures: electrolyte and renal panel, lipid profile, gastrointestinal profile, glucose/endocrine profile, inflammatory profile  Nutrition-Focused Physical Findings: overall appearance     Nutrition Follow-Up    RD Follow-up?: Yes

## 2018-04-02 NOTE — ASSESSMENT & PLAN NOTE
Nutrition Diagnosis  Inadequate oral intake    Related to (etiology):   Decreased appetite, nausea with eating    Signs and Symptoms (as evidenced by):   Pt eating 0%-50% of meals and relying on TPN nutrition    Interventions/Recommendations (treatment strategy):  See recs    Nutrition Diagnosis Status:   Continues

## 2018-04-02 NOTE — PLAN OF CARE
Problem: Patient Care Overview  Goal: Plan of Care Review  Outcome: Ongoing (interventions implemented as appropriate)  Patient is AAOx3, independent. Patient c/o abdominal pain and nausea. PRN meds given per orders. Patient continues to receive nocturnal TPN. Encouraging small, frequent meals during the day. Right SC perm cath dressing and left IJ TLC dressing changed today. Patient had blood cultures x2 done and urin sent for UA and culture. Patient refused the Heparin shot. Encouraging the patient to get OOB and walk in the halls with his mother. Reminded the patient and his mother to call for assistance. Call light and personal items are within reach.

## 2018-04-03 PROBLEM — E83.39 HYPERPHOSPHATEMIA: Status: ACTIVE | Noted: 2018-04-03

## 2018-04-03 LAB
A-TOCOPHEROL VIT E SERPL-MCNC: 1453 UG/DL (ref 500–1800)
ALBUMIN SERPL BCP-MCNC: 2.7 G/DL
ALP SERPL-CCNC: 107 U/L
ALT SERPL W/O P-5'-P-CCNC: 7 U/L
ANION GAP SERPL CALC-SCNC: 13 MMOL/L
AST SERPL-CCNC: 17 U/L
BACTERIA UR CULT: NO GROWTH
BASOPHILS # BLD AUTO: 0.01 K/UL
BASOPHILS NFR BLD: 0.3 %
BILIRUB SERPL-MCNC: 1.1 MG/DL
BUN SERPL-MCNC: 85 MG/DL
CALCIUM SERPL-MCNC: 8.9 MG/DL
CHLORIDE SERPL-SCNC: 100 MMOL/L
CO2 SERPL-SCNC: 26 MMOL/L
CREAT SERPL-MCNC: 3.6 MG/DL
CYCLOSPORINE BLD LC/MS/MS-MCNC: 156 NG/ML
DIFFERENTIAL METHOD: ABNORMAL
EOSINOPHIL # BLD AUTO: 0.1 K/UL
EOSINOPHIL NFR BLD: 2.4 %
ERYTHROCYTE [DISTWIDTH] IN BLOOD BY AUTOMATED COUNT: 16.1 %
EST. GFR  (AFRICAN AMERICAN): 25.1 ML/MIN/1.73 M^2
EST. GFR  (NON AFRICAN AMERICAN): 21.7 ML/MIN/1.73 M^2
GLUCOSE SERPL-MCNC: 106 MG/DL
HCT VFR BLD AUTO: 25.5 %
HGB BLD-MCNC: 8.4 G/DL
IMM GRANULOCYTES # BLD AUTO: 0.01 K/UL
IMM GRANULOCYTES NFR BLD AUTO: 0.3 %
LYMPHOCYTES # BLD AUTO: 1.1 K/UL
LYMPHOCYTES NFR BLD: 37.7 %
MAGNESIUM SERPL-MCNC: 2 MG/DL
MCH RBC QN AUTO: 33.9 PG
MCHC RBC AUTO-ENTMCNC: 32.9 G/DL
MCV RBC AUTO: 103 FL
MONOCYTES # BLD AUTO: 0.4 K/UL
MONOCYTES NFR BLD: 13.5 %
NEUTROPHILS # BLD AUTO: 1.3 K/UL
NEUTROPHILS NFR BLD: 45.8 %
NRBC BLD-RTO: 0 /100 WBC
PHOSPHATE SERPL-MCNC: 6.5 MG/DL
PLATELET # BLD AUTO: 74 K/UL
PMV BLD AUTO: 11.4 FL
POTASSIUM SERPL-SCNC: 3.8 MMOL/L
PROT SERPL-MCNC: 5.9 G/DL
RBC # BLD AUTO: 2.48 M/UL
SODIUM SERPL-SCNC: 139 MMOL/L
WBC # BLD AUTO: 2.89 K/UL

## 2018-04-03 PROCEDURE — B4185 PARENTERAL SOL 10 GM LIPIDS: HCPCS | Performed by: PHYSICIAN ASSISTANT

## 2018-04-03 PROCEDURE — 63600175 PHARM REV CODE 636 W HCPCS: Performed by: PHYSICIAN ASSISTANT

## 2018-04-03 PROCEDURE — 25000003 PHARM REV CODE 250

## 2018-04-03 PROCEDURE — 80053 COMPREHEN METABOLIC PANEL: CPT

## 2018-04-03 PROCEDURE — 80158 DRUG ASSAY CYCLOSPORINE: CPT

## 2018-04-03 PROCEDURE — 25000003 PHARM REV CODE 250: Performed by: PHYSICIAN ASSISTANT

## 2018-04-03 PROCEDURE — 84100 ASSAY OF PHOSPHORUS: CPT

## 2018-04-03 PROCEDURE — 83735 ASSAY OF MAGNESIUM: CPT

## 2018-04-03 PROCEDURE — 94664 DEMO&/EVAL PT USE INHALER: CPT

## 2018-04-03 PROCEDURE — 99900035 HC TECH TIME PER 15 MIN (STAT)

## 2018-04-03 PROCEDURE — 20600001 HC STEP DOWN PRIVATE ROOM

## 2018-04-03 PROCEDURE — 99233 SBSQ HOSP IP/OBS HIGH 50: CPT | Mod: ,,, | Performed by: PHYSICIAN ASSISTANT

## 2018-04-03 PROCEDURE — A4217 STERILE WATER/SALINE, 500 ML: HCPCS | Performed by: PHYSICIAN ASSISTANT

## 2018-04-03 PROCEDURE — 25000003 PHARM REV CODE 250: Performed by: NURSE PRACTITIONER

## 2018-04-03 PROCEDURE — 85025 COMPLETE CBC W/AUTO DIFF WBC: CPT

## 2018-04-03 PROCEDURE — 63600175 PHARM REV CODE 636 W HCPCS: Performed by: NURSE PRACTITIONER

## 2018-04-03 RX ORDER — SODIUM CHLORIDE 9 MG/ML
INJECTION, SOLUTION INTRAVENOUS ONCE
Status: DISCONTINUED | OUTPATIENT
Start: 2018-04-03 | End: 2018-04-10

## 2018-04-03 RX ORDER — SEVELAMER CARBONATE 800 MG/1
800 TABLET, FILM COATED ORAL
Status: DISCONTINUED | OUTPATIENT
Start: 2018-04-03 | End: 2018-04-10 | Stop reason: HOSPADM

## 2018-04-03 RX ADMIN — MAGNESIUM SULFATE HEPTAHYDRATE: 500 INJECTION, SOLUTION INTRAMUSCULAR; INTRAVENOUS at 10:04

## 2018-04-03 RX ADMIN — URSODIOL 300 MG: 300 CAPSULE ORAL at 06:04

## 2018-04-03 RX ADMIN — OXYCODONE HYDROCHLORIDE 10 MG: 5 TABLET ORAL at 06:04

## 2018-04-03 RX ADMIN — DRONABINOL 2.5 MG: 2.5 CAPSULE ORAL at 09:04

## 2018-04-03 RX ADMIN — DOCUSATE SODIUM 100 MG: 100 CAPSULE, LIQUID FILLED ORAL at 08:04

## 2018-04-03 RX ADMIN — LEVOTHYROXINE SODIUM 75 MCG: 75 TABLET ORAL at 06:04

## 2018-04-03 RX ADMIN — METOCLOPRAMIDE HYDROCHLORIDE 10 MG: 5 SOLUTION ORAL at 06:04

## 2018-04-03 RX ADMIN — OXYCODONE HYDROCHLORIDE 10 MG: 5 TABLET ORAL at 10:04

## 2018-04-03 RX ADMIN — PREDNISONE 10 MG: 10 TABLET ORAL at 06:04

## 2018-04-03 RX ADMIN — METOCLOPRAMIDE HYDROCHLORIDE 10 MG: 5 SOLUTION ORAL at 11:04

## 2018-04-03 RX ADMIN — OMEPRAZOLE 40 MG: 40 CAPSULE, DELAYED RELEASE ORAL at 07:04

## 2018-04-03 RX ADMIN — CYCLOSPORINE 125 MG: 100 CAPSULE, LIQUID FILLED ORAL at 05:04

## 2018-04-03 RX ADMIN — CYCLOSPORINE 125 MG: 100 CAPSULE, LIQUID FILLED ORAL at 06:04

## 2018-04-03 RX ADMIN — OXYCODONE HYDROCHLORIDE 10 MG: 5 TABLET ORAL at 07:04

## 2018-04-03 RX ADMIN — OXYCODONE HYDROCHLORIDE 10 MG: 5 TABLET ORAL at 02:04

## 2018-04-03 RX ADMIN — LEVETIRACETAM 500 MG: 100 SOLUTION ORAL at 09:04

## 2018-04-03 RX ADMIN — SOYBEAN OIL 250 ML: 20 INJECTION, SOLUTION INTRAVENOUS at 10:04

## 2018-04-03 RX ADMIN — METOCLOPRAMIDE HYDROCHLORIDE 10 MG: 5 SOLUTION ORAL at 09:04

## 2018-04-03 RX ADMIN — METOCLOPRAMIDE HYDROCHLORIDE 10 MG: 5 SOLUTION ORAL at 05:04

## 2018-04-03 RX ADMIN — URSODIOL 300 MG: 300 CAPSULE ORAL at 09:04

## 2018-04-03 RX ADMIN — SEVELAMER CARBONATE 800 MG: 800 TABLET, FILM COATED ORAL at 05:04

## 2018-04-03 RX ADMIN — LEVETIRACETAM 500 MG: 100 SOLUTION ORAL at 08:04

## 2018-04-03 RX ADMIN — OXYCODONE HYDROCHLORIDE 10 MG: 5 TABLET ORAL at 12:04

## 2018-04-03 RX ADMIN — SEVELAMER CARBONATE 800 MG: 800 TABLET, FILM COATED ORAL at 11:04

## 2018-04-03 RX ADMIN — DOCUSATE SODIUM 100 MG: 100 CAPSULE, LIQUID FILLED ORAL at 09:04

## 2018-04-03 RX ADMIN — BISACODYL 10 MG: 5 TABLET, COATED ORAL at 08:04

## 2018-04-03 NOTE — PROGRESS NOTES
Update:  SW met with pt alone in the pt's room in order to provide continuity of care and support. Pt was AAOx4 and sitting on the sofa watching a movie. Pt reports that he is coping okay but going stir-crazy remaining in the hospital. Pt reports that while he had more abdominal pain yesterday he is feeling okay today. Pt reports that today his goals are to get outside, walk around and eat more regular food. Pt aware that he is making good urine and that kidneys are waking up. Pt hoping to be discharged from the hospital this week but aware that team wants to see more good days before sending him out. Pt reports understanding as he does not want to be discharged before he is ready for fear that he would need to be readmitted. Pt interested in outpatient PT if he is nearing discharge as he did not find HH PT that helpful. SW suggested that pt speak with [facility] to discuss this as an option. Pt also aware that he will likely need outpatient dialysis upon discharge hence his [facility] chair is still set up. Pt denied any current needs at this time and reports that he will reach out if anything changes. SW remains available for continued psychosocial support, education, resources, and additional d/c planning as needed.

## 2018-04-03 NOTE — PLAN OF CARE
Problem: Patient Care Overview  Goal: Plan of Care Review  Outcome: Ongoing (interventions implemented as appropriate)  Patient is AAOx3, independent. Patient c/o abdominal pain and nausea. PRN meds given per orders. Patient continues to receive nocturnal TPN. Encouraging small, frequent meals during the day. Patient did not eat breakfast but did eat 1/2 of a pulled pork sandwich for lunch. Patient started on Renvela with meals. RLQ dressing removed by wound care today and cream applied.  Patient refused the Heparin shot. Encouraging the patient to get OOB and walk in the halls with his mother. Patient was walked in the halls twice today with his mom and has been sitting up on th sofa all day today. HD cancelled this morning. Reminded the patient and his mother to call for assistance. Call light and personal items are within reach.

## 2018-04-03 NOTE — PROGRESS NOTES
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  Wound has totally epitheliazed again . Applied Sween barrier cream to entire scar line to keep him form abrading area.   Discussed with patient and his mother the site may reopen if his ascites increases his abdominal girth.   Following from afar.  Zoë Murphy RN CWON  [id number]

## 2018-04-03 NOTE — PROGRESS NOTES
RD discussed calorie and protein goals with patient and mother. Explained goal to start decreasing TPN was 700 kcal/day with 40g protein per day every day for 3 days. Ultimate goal for once TPN decreased and for d/c was 2000 kcal/day and 90g protein.     Provided recommendations for foods to provide adequate kcal, protein, and vitamins/minerals.     PO intake has much improved today and yesterday. Ate 500-700kcal/day 4/2 and 4/3. If intake good 4/4 also, recommend starting to wean TPN.     Will follow up as previously scheduled.

## 2018-04-03 NOTE — ASSESSMENT & PLAN NOTE
- chronic  - encourage ambulation, stool softeners and laxatives, often refuses some meds 2/2 nausea, will often require suppository or brown bomb enema to have a BM.  - Small bowel follow through with normal findings.  - Avoid narcotics -need to ween

## 2018-04-03 NOTE — ASSESSMENT & PLAN NOTE
- 3/1, patient with stuttering speech, trouble recalling specific words when speaking. Does have hx of seizure (on keppra) previously while on prograf. Switched to prograf over the weekend from cyclosporine. Neuro exam unremarkable.   - CT head without contrast obtained, unremarkable.   - In light of these symptoms, prograf d/c, restart cyclosporine 3/1.  - remains w/o seizure. Cont to have stutter/garbled speech/difficulty finding words.  Worse post EGD as sedation was given.  - MRI 3/6 unremarkable.  - EEG completed 3/8 - mild, generalized, non-specific cerebral dysfunction, no epileptiform activity.  - monitor closely. VSS.

## 2018-04-03 NOTE — SUBJECTIVE & OBJECTIVE
Scheduled Meds:   bisacodyl  10 mg Oral Daily    bisacodyl  10 mg Rectal Once    cycloSPORINE modified  125 mg Oral BID    docusate sodium  100 mg Oral BID    dronabinol  2.5 mg Oral QHS    epoetin maurisio (PROCRIT) injection  12,600 Units Intravenous Every Mon, Wed, Fri    ergocalciferol  50,000 Units Oral Q7 Days    fat emulsion 20%  250 mL Intravenous Once    heparin (porcine)  5,000 Units Subcutaneous Q8H    levetiracetam oral soln  500 mg Oral BID    levothyroxine  75 mcg Oral Before breakfast    metoclopramide HCl  10 mg Oral QID (AC & HS)    omeprazole  40 mg Oral QAM    polyethylene glycol  17 g Oral BID    predniSONE  10 mg Oral Daily    psyllium husk (aspartame)  3.4 g Oral BID    ursodiol  300 mg Oral BID     Continuous Infusions:   TPN ADULT CENTRAL LINE CUSTOM       PRN Meds:sodium chloride 0.9%, acetaminophen, albuterol-ipratropium 2.5mg-0.5mg/3mL, bisacodyl, butalbital-acetaminophen-caffeine -40 mg, dextrose 50%, dextrose 50%, diphenhydrAMINE-zinc acetate 1-0.1%, glucagon (human recombinant), glucose, glucose, heparin (porcine), naloxone, ondansetron, ondansetron, oxyCODONE, oxyCODONE, prochlorperazine, prochlorperazine, simethicone, sodium chloride 0.9%, sodium chloride 0.9%    Review of Systems   Constitutional: Positive for activity change, appetite change and fatigue. Negative for chills and fever.   HENT: Negative for congestion and facial swelling.    Eyes: Negative for pain, discharge and visual disturbance.   Respiratory: Negative.  Negative for cough, chest tightness, shortness of breath and wheezing.    Cardiovascular: Negative.  Negative for chest pain, palpitations and leg swelling.   Gastrointestinal: Positive for abdominal distention and abdominal pain. Negative for constipation, diarrhea, nausea and vomiting.   Endocrine: Negative.    Genitourinary: Negative for decreased urine volume, difficulty urinating, dysuria, hematuria and urgency.   Musculoskeletal: Negative  for back pain, gait problem, neck pain and neck stiffness.   Skin: Negative for color change and pallor.   Allergic/Immunologic: Positive for immunocompromised state.   Neurological: Negative for dizziness, seizures, weakness, light-headedness and headaches.   Psychiatric/Behavioral: Negative for behavioral problems, confusion, dysphoric mood, hallucinations, sleep disturbance and suicidal ideas. The patient is not nervous/anxious.      Objective:     Vital Signs (Most Recent):  Temp: 98.7 °F (37.1 °C) (04/02/18 1600)  Pulse: 84 (04/02/18 1600)  Resp: 18 (04/02/18 1600)  BP: (!) 157/94 (04/02/18 1600)  SpO2: 96 % (04/02/18 1600) Vital Signs (24h Range):  Temp:  [98.7 °F (37.1 °C)-99 °F (37.2 °C)] 98.7 °F (37.1 °C)  Pulse:  [82-88] 84  Resp:  [16-20] 18  SpO2:  [94 %-98 %] 96 %  BP: (145-161)/(83-97) 157/94     Weight: 61 kg (134 lb 7.7 oz)  Body mass index is 21.72 kg/m².    Intake/Output - Last 3 Shifts       03/31 0700 - 04/01 0659 04/01 0700 - 04/02 0659 04/02 0700 - 04/03 0659    P.O. 550 350 600    I.V. (mL/kg) 20 (0.3)      Other 0      IV Piggyback  50     .5 209 247    Total Intake(mL/kg) 1088.5 (17.1) 609 (10) 847 (13.9)    Urine (mL/kg/hr) 1150 (0.8) 1225 (0.8) 900 (1.2)    Emesis/NG output 0 (0) 0 (0)     Other 0 (0) 0 (0)     Stool 0 (0) 0 (0) 0 (0)    Blood 0 (0) 0 (0)     Total Output 1150 1225 900    Net -61.5 -616 -53           Urine Occurrence 0 x 0 x     Stool Occurrence 0 x 1 x 0 x    Emesis Occurrence 0 x 1 x           Physical Exam   Constitutional: He is oriented to person, place, and time. He appears well-developed.   Hand and temporal muscle wasting   HENT:   Head: Normocephalic.   Eyes: Pupils are equal, round, and reactive to light. No scleral icterus.   Cardiovascular: Normal rate, regular rhythm, normal heart sounds and intact distal pulses.    Pulmonary/Chest: Effort normal and breath sounds normal. No respiratory distress. He has no wheezes. He has no rhonchi. He has no rales.    Abdominal: Soft. Bowel sounds are normal. He exhibits distension. There is no tenderness.   Musculoskeletal: Normal range of motion. He exhibits edema (generalized).   Neurological: He is alert and oriented to person, place, and time.   Skin: Skin is warm and dry.   Psychiatric: He has a normal mood and affect. His behavior is normal. Judgment and thought content normal.   Nursing note and vitals reviewed.      Laboratory:  Immunosuppressants         Stop Route Frequency     cycloSPORINE modified capsule 125 mg      -- Oral 2 times daily     cycloSPORINE modified (NEORAL) 25 MG capsule      03/09 1714       cycloSPORINE modified (NEORAL) 100 MG capsule      03/09 1714       cycloSPORINE modified (NEORAL) 100 MG capsule      03/09 0514       cycloSPORINE modified (NEORAL) 25 MG capsule      03/09 0514          CBC:     Recent Labs  Lab 04/02/18  0445   WBC 3.01*   RBC 2.67*   HGB 8.5*   HCT 27.1*   PLT 81*   *   MCH 31.8*   MCHC 31.4*     CMP:     Recent Labs  Lab 04/02/18  0445   GLU 93   CALCIUM 9.3   ALBUMIN 2.8*   PROT 6.0      K 3.9   CO2 25      BUN 80*   CREATININE 3.7*   ALKPHOS 111   ALT 9*   AST 19   BILITOT 1.1*     Labs within the past 24 hours have been reviewed.    Diagnostic Results:  I have personally reviewed all pertinent imaging studies.

## 2018-04-03 NOTE — ASSESSMENT & PLAN NOTE
- h/o constipation, abdominal pain, and N/V  - worsened with SHANE tube and TF --> now on TPN  - encourage bowel regimen  - EGD performed 1/25 and 3/6, gastroparesis, duodenal ulcer, and congested/erythematous gastric mucosa  - Small bowel through with normal findings  - Continue reglan and marinol scheduled, anti-emetics PRN.   - If cont with GI symptoms then may need to consider Flex sig/ C-scope to further eval.   - Abd xray with distended colon - did not want enema  - D/C Cellcept 3/31  - Abd pain improved today, if worsens will likely perform CT A/P tomorrow

## 2018-04-03 NOTE — ASSESSMENT & PLAN NOTE
- Poor PO intake since transplant requiring short course of TPN during previous hospital stay.   - Due to N/V and abdominal pain, multiple attempts were made to place SHANE tube with tube feeding, but patient did not tolerate TF  - Transitioned to TPN 1/16 then again on 2/24 due to poor tolerance of SHANE tube/TF  - prealbumin 7 on 1/15 --> prealbumin 22 on 3/19, monitoring weekly (Mondays)  - EGD performed 1/25 and 3/6, gastroparesis, duodenal ulcer, and congested/erythematous gastric mucosa  - Small bowel through with normal findings.  - Consulted GI for GJ tube placement, but was canceled due to ongoing fevers  - Hold Reglan and Marinol 3/8 for neuro symptoms --> restarted reglan and marinol 3/19, will need to dc marinol before discharge because not covered by insurance.  - set goals for caloric intake with patient, continue nocturnal TPN started 3/20 PM to encourage appetite during the day  - Trying to set small caloric goals daily, for now goal is 300-500 jalen/day. Ultimately needs to consume 700 jalen/day to start weaning TPN.    -met with dietician 4/3/18 to discuss dietary goals  - Continue full dose TPN for now

## 2018-04-03 NOTE — PROGRESS NOTES
[facility]  Liver Transplant  Progress Note    Patient Name: Jhonny Diana  MRN: [id number]  Admission Date: 2018  Hospital Length of Stay: 82 days  Code Status: Full Code  Primary Care Provider: Primary Doctor No  Post-Operative Day: 166    ORGAN:   LIVER  Disease Etiology: Acute Alcoholic Hepatitis  Donor Type:    - Brain Death  CDC High Risk:   No  Donor CMV Status:   Donor CMV Status: Positive  Donor HBcAB:   Negative  Donor HCV Status:   Negative  Whole or Partial: Whole Liver  Biliary Anastomosis: End to End  Arterial Anatomy: Standard  Subjective:     History of Present Illness:  Jhonny Diana is a 28 y/o male with past medical history of alcoholic cirrhosis.  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 Found to have peritonitis in November (WBC 5000, 75% PNM) neg for bile leak. He was treated initially with vanc/cefepime. Repeat cell counts  with some improvement (WBC 1400, 45% PNM). He has undergone multiple paracenteses as well as abscess drainage of perihepatic fluid collections and treated with antimicrobial therapy but no positive cultures. Of note, biliary stricture also identified and ERCP performed on 2017 with sphincterotomy and biliary stent placed. Liver tests still have not normalized despite intervention, bilirubin and AP remain elevated. Other pertinent PMH current wound vac in place 2/2 wound infection, malnutrition requiring TPN for short course and ongoing hypoalbuminemia, and seizure activity while on prograf and has since been switched to cyclosporine without reoccurrence.  He presented to the ER for fever, abdominal pain, and N/V. He reports fever (103) for 1  day prior. Overnight, he developed N/V, reports small amount of green emesis with new left sided pain. He also endorses worsening SOB with exertion. He was scheduled as an outpatient for follow up paracentesis and IR drainage of fluid collection. CXR in ER shows large pleural effusion with subsegmental atelectasis. Infectious work up initiated in ER. His ANC is 900. Broad spectrum antibiotics initiated in ED. At admit, he denied chest pain, palpitations, diarrhea, constipation, back pain, or any sick contacts.    Hospital Course:  Unclear cause of ongoing fevers. Multiple infectious, diagnostic testing completed. Taken to OR for ex lap 2/6, loculated fluid collections found, cultures NGTD. Multiple blood cultures with NGTD. Thoracentesis 1/11, 1/18, 1/31, and 2/16, cultures NGTD. Paracentesis 1/11, 1/19, and 2/5, cultures NGTD. negative fungal markers/quant gold intermediate. Negative TTE.  IR drainage 1/11, cultures NGTD. Continued with intermittent fevers despite broad spectrum antibiotics that were initially started 1/11, then broadened antibiotics 2/22 from vanc/zosyn to ertapenem. Multiple CT scan of abd obtained that were unremarkable. Tagged WBCs scan 2/27 without source of infection found. He had several episodes of neutropenia requiring neupogen, last given 2/28. 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 Monitoring weekly CMV PCR. Chimerism studies obtained 3/1 to assess for GVHD, which was negative, EBV PCR negative, adenovirus negative. In addition to fevers, he has consistently c/o abdominal pain and N/V. Tbili has remain elevated post-op requiring multiple ERCPs. ERCPs performed this admission on 1/30, 2/19, and 3/9. ERCPs with sludge and stones with stents placed. Last ERCP 3/9 with metal stent placed into CBD and stones removed. His ertapenem was d/cd 3/7 due to AMS. Following ERCP 3/9, he was started on cefepime and flagyl  for possible cholangitis. He has remained afebrile since 3/10. He completed cefepime/flagyl after 3 weeks on 3/19 & 3/20. Due to elevated enzymes, he had a liver biopsy 1/23 consistent with cholestatic hepatitis (no cultures obtained during biopsy). GI was consulted for persistent N/V and malnutrition. EGD performed 1/25 and 3/6, EGD 3/6 with normal esophagus congested, erythematous and nodular mucosa in the stomach, one duodenal ulcer with a clean ulcer base (Emir Class III), biopsies taken, negative for CMV and GVHD. Due to N/V and abdominal pain, multiple attempts were made to place SHANE tube with tube feeding, but patient did not tolerate TF. Therefore, he was transitioned to TPN 1/16 then again on 2/24. He also has chronic constipation. Small bowel follow through study with normal findings. Following EGD 1/25, he was started on a PPI, miralax, and Metamucil, but he did not tolerate meds. For appetite stimulation he was trialed on marinol. He also did well with reglan before meals and at bedtime. Early post-op liver transplant surgery, he was switched to cyclosporine for seizures. During this admission, he was transitioned back to prograf 2/24 with keppra on board. Starting 3/1 he developed stuttering speech, disorientation. CT head 3/1 and MRI head 3/6 unremarkable. EEG 3/8 with no seizure activity. He started on rapamune and stress dose steroids 3/10, then ultimately returned to cyclosporine 3/13 and rapamune d/cd same day. Since transitioning back to cyclosporine, his mental status has remained stable. During admission, patient was evaluated by [facility] for future kidney transplant, and he was deemed to be a suitable candidate once medically stable and cleared. He dialyzes on a M-W-F schedule. He was anuric on admission, but starting producing urine around 3/16.  Now producing >2-3L per day.       Interval History: No acute events overnight. Slowly trying to improve caloric intake, goal 300-500 jalen/day, goal is  to wean TPN once 700 jalen/day. Will continue full dose for now. Was able to eat full lunch and partial diner yesterday. Continue reglan and marinol.  Patient reports improvement in abdominal pain today. Cultures sent yesterday, UA clean, Ucx pending, Bcx NGTD. If pain persists, will likely order CT A/P in AM.  Last HD Monday 3/29 (plan is twice weekly until starts clearing)- planned for HD today, however creatinine improved today so will hold off for today. Holding Torsemide 50 mg daily for now/  He remains afebrile off antibiotics. Start phos binder today. Monitor.     Scheduled Meds:   sodium chloride 0.9%   Intravenous Once    bisacodyl  10 mg Oral Daily    cycloSPORINE modified  125 mg Oral BID    docusate sodium  100 mg Oral BID    dronabinol  2.5 mg Oral QHS    epoetin maurisio (PROCRIT) injection  12,600 Units Intravenous Every Mon, Wed, Fri    ergocalciferol  50,000 Units Oral Q7 Days    fat emulsion 20%  250 mL Intravenous Once    heparin (porcine)  5,000 Units Subcutaneous Q8H    levetiracetam oral soln  500 mg Oral BID    levothyroxine  75 mcg Oral Before breakfast    metoclopramide HCl  10 mg Oral QID (AC & HS)    omeprazole  40 mg Oral QAM    predniSONE  10 mg Oral Daily    sevelamer carbonate  800 mg Oral TID WM    ursodiol  300 mg Oral BID     Continuous Infusions:   TPN ADULT CENTRAL LINE CUSTOM       PRN Meds:sodium chloride 0.9%, acetaminophen, albuterol-ipratropium 2.5mg-0.5mg/3mL, bisacodyl, butalbital-acetaminophen-caffeine -40 mg, dextrose 50%, dextrose 50%, diphenhydrAMINE-zinc acetate 1-0.1%, glucagon (human recombinant), glucose, glucose, heparin (porcine), naloxone, ondansetron, ondansetron, oxyCODONE, oxyCODONE, prochlorperazine, prochlorperazine, simethicone, sodium chloride 0.9%, sodium chloride 0.9%    Review of Systems   Constitutional: Positive for activity change, appetite change and fatigue. Negative for chills and fever.   HENT: Negative for congestion and facial  swelling.    Eyes: Negative for pain, discharge and visual disturbance.   Respiratory: Negative.  Negative for cough, chest tightness, shortness of breath and wheezing.    Cardiovascular: Negative.  Negative for chest pain, palpitations and leg swelling.   Gastrointestinal: Positive for abdominal distention and abdominal pain. Negative for constipation, diarrhea, nausea and vomiting.   Endocrine: Negative.    Genitourinary: Negative for decreased urine volume, difficulty urinating, dysuria, hematuria and urgency.   Musculoskeletal: Negative for back pain, gait problem, neck pain and neck stiffness.   Skin: Negative for color change and pallor.   Allergic/Immunologic: Positive for immunocompromised state.   Neurological: Negative for dizziness, seizures, weakness, light-headedness and headaches.   Psychiatric/Behavioral: Negative for behavioral problems, confusion, dysphoric mood, hallucinations, sleep disturbance and suicidal ideas. The patient is not nervous/anxious.      Objective:     Vital Signs (Most Recent):  Temp: 98.2 °F (36.8 °C) (04/03/18 1153)  Pulse: 76 (04/03/18 1153)  Resp: 18 (04/03/18 1153)  BP: 137/82 (04/03/18 1153)  SpO2: 99 % (04/03/18 1153) Vital Signs (24h Range):  Temp:  [98.2 °F (36.8 °C)-99.1 °F (37.3 °C)] 98.2 °F (36.8 °C)  Pulse:  [76-85] 76  Resp:  [18] 18  SpO2:  [94 %-99 %] 99 %  BP: (134-157)/(78-94) 137/82     Weight: 61 kg (134 lb 7.7 oz)  Body mass index is 21.72 kg/m².    Intake/Output - Last 3 Shifts       04/01 0700 - 04/02 0659 04/02 0700 - 04/03 0659 04/03 0700 - 04/04 0659    P.O. 350 1050 360    I.V. (mL/kg)       Other       IV Piggyback 50       859.1     Total Intake(mL/kg) 609 (10) 1909.1 (31.3) 360 (5.9)    Urine (mL/kg/hr) 1225 (0.8) 1650 (1.1) 100 (0.3)    Emesis/NG output 0 (0)      Other 0 (0)      Stool 0 (0) 0 (0) 0 (0)    Blood 0 (0)      Total Output 1225 1650 100    Net -616 +259.1 +260           Urine Occurrence 0 x      Stool Occurrence 1 x 0 x 0 x     Emesis Occurrence 1 x            Physical Exam   Constitutional: He is oriented to person, place, and time. He appears well-developed.   Hand and temporal muscle wasting   HENT:   Head: Normocephalic.   Eyes: Pupils are equal, round, and reactive to light. No scleral icterus.   Cardiovascular: Normal rate, regular rhythm, normal heart sounds and intact distal pulses.    Pulmonary/Chest: Effort normal and breath sounds normal. No respiratory distress. He has no wheezes. He has no rhonchi. He has no rales.   Abdominal: Soft. Bowel sounds are normal. He exhibits distension. There is no tenderness.   Musculoskeletal: Normal range of motion. He exhibits edema (generalized).   Neurological: He is alert and oriented to person, place, and time.   Skin: Skin is warm and dry.   Psychiatric: He has a normal mood and affect. His behavior is normal. Judgment and thought content normal.   Nursing note and vitals reviewed.      Laboratory:  Immunosuppressants         Stop Route Frequency     cycloSPORINE modified capsule 125 mg      -- Oral 2 times daily     cycloSPORINE modified (NEORAL) 25 MG capsule      03/09 1714       cycloSPORINE modified (NEORAL) 100 MG capsule      03/09 1714       cycloSPORINE modified (NEORAL) 100 MG capsule      03/09 0514       cycloSPORINE modified (NEORAL) 25 MG capsule      03/09 0514          CBC:   Recent Labs  Lab 04/03/18  0400   WBC 2.89*   RBC 2.48*   HGB 8.4*   HCT 25.5*   PLT 74*   *   MCH 33.9*   MCHC 32.9     CMP:   Recent Labs  Lab 04/03/18  0400      CALCIUM 8.9   ALBUMIN 2.7*   PROT 5.9*      K 3.8   CO2 26      BUN 85*   CREATININE 3.6*   ALKPHOS 107   ALT 7*   AST 17   BILITOT 1.1*     Labs within the past 24 hours have been reviewed.    Diagnostic Results:  pertinent imaging reviewed    Assessment/Plan:     Biliary stricture of transplanted liver    - ERCP 12/6 with post-anastomosis stricture with stent placement.  - Tbili with increase prompting ERCP 1/30  with stones and sludge, stents placed.  - Repeat ERCP 2/19 as was having recurrent fevers with bilirubin stuck at 2.0-2.2 with overall unremarkable findings seen. Did place new larger stents.   - ERCP/EUS 3/9 with metal stent placed in bile duct, stones removed.  - Tbili remains stable.   - Continue actigall.        Nausea and vomiting    - h/o constipation, abdominal pain, and N/V  - worsened with SHANE tube and TF --> now on TPN  - encourage bowel regimen  - EGD performed 1/25 and 3/6, gastroparesis, duodenal ulcer, and congested/erythematous gastric mucosa  - Small bowel through with normal findings  - Continue reglan and marinol scheduled, anti-emetics PRN.   - If cont with GI symptoms then may need to consider Flex sig/ C-scope to further eval.   - Abd xray with distended colon - did not want enema  - D/C Cellcept 3/31  - Abd pain improved today, if worsens will likely perform CT A/P tomorrow        Severe protein-calorie malnutrition    - Poor PO intake since transplant requiring short course of TPN during previous hospital stay.   - Due to N/V and abdominal pain, multiple attempts were made to place SHANE tube with tube feeding, but patient did not tolerate TF  - Transitioned to TPN 1/16 then again on 2/24 due to poor tolerance of SHANE tube/TF  - prealbumin 7 on 1/15 --> prealbumin 22 on 3/19, monitoring weekly (Mondays)  - EGD performed 1/25 and 3/6, gastroparesis, duodenal ulcer, and congested/erythematous gastric mucosa  - Small bowel through with normal findings.  - Consulted GI for GJ tube placement, but was canceled due to ongoing fevers  - Hold Reglan and Marinol 3/8 for neuro symptoms --> restarted reglan and marinol 3/19, will need to dc marinol before discharge because not covered by insurance.  - set goals for caloric intake with patient, continue nocturnal TPN started 3/20 PM to encourage appetite during the day  - Trying to set small caloric goals daily, for now goal is 300-500 jalen/day. Ultimately  needs to consume 700 jalen/day to start weaning TPN.    -met with dietician 4/3/18 to discuss dietary goals  - Continue full dose TPN for now        Constipation    - chronic  - encourage ambulation, stool softeners and laxatives, often refuses some meds 2/2 nausea, will often require suppository or brown bomb enema to have a BM.  - Small bowel follow through with normal findings.  - Avoid narcotics -need to ween        Liver transplanted    - Post op course complicated by fevers and hyperbilirubinemia, AST/ALT stable.  - see biliary stricture of transplanted liver  - Liver biopsy 1/23 without rejection.  - Paracentesis 3/6 negative for infection, wbc 28 segs 3%.  - ERCP, EUS biopsy 3/9/18 - no rejection, mild cholestatic hepatitis seen.   - T bili normalized        Long-term use of immunosuppressant medication    - Maintenance IS with cyclosporine --> transitioned to Prograf 2/24 for continued nausea but did not tolerate from neuro standpoint.  PT IS NOT A CANDIDATE FOR TACROLIMUS.    - Transitioned back to cyclosporine 3/1  - D/c cyclosporine, started rapa & stress dose steroids 3/10 for continued confusion  - Mental status now improved, re-trial cyclosporine 3/13, d/c rapa 3/13  - Transition back to prednisone taper from stress dose steroids 3/15 --> will decrease by 5 mg q week (decrease to 5 mg 4/6).  - Restarted low dose MMF 3/23- with nausea --> D/C Cellcept 3/31 --> nausea improved --> continue to hold as has passed protocol for Cellcept        At risk for opportunistic infections    - See other cytomegaloviral disease  - Pentam given 3/27  - D/C Isavu 3/21.        Prophylactic immunotherapy    - See long term use of immunosuppression.         Delayed surgical wound healing    - Wd vac removed 1/12/18.    - Wound healing well.   - Changing dressing q Tuesday, last 3/27.        Anemia of chronic disease    - Continue Procrit  - Monitor daily labs. H/H stable, transfuse PRN.         Hyperphosphatemia    - start  renvela. Monitor.         Vitamin D deficiency    - Continue ergo 50K weekly.         Other cytomegaloviral diseases    - detected at 370 on CMV PCR 2/1.  - case discussed with ID and will hold off on treatment at this time given low WBC.   - undetected CMV PCR 2/8, 2/15, 2/22, 3/8   - continue Valcyte 200 mg every Mon, Wed, Fri.  - Per ID, valcyte dose decreased to ppx dose on 2/18--Valcyte d/c'd 2/25  - Repeat scope 3/6 to assess persistent N/V and possible CMV causing these symptoms. Biopsies thus far normal, cmv stains negative.  - continue to monitor weekly CMV PCR's, last PCR neg from 3/15, recent level from 3/22 undetected.  - CMV 3/29 undetected.        Seizure    - 3/1, patient with stuttering speech, trouble recalling specific words when speaking. Does have hx of seizure (on keppra) previously while on prograf. Switched to prograf over the weekend from cyclosporine. Neuro exam unremarkable.   - CT head without contrast obtained, unremarkable.   - In light of these symptoms, prograf d/c, restart cyclosporine 3/1.  - remains w/o seizure. Cont to have stutter/garbled speech/difficulty finding words.  Worse post EGD as sedation was given.  - MRI 3/6 unremarkable.  - EEG completed 3/8 - mild, generalized, non-specific cerebral dysfunction, no epileptiform activity.  - monitor closely. VSS.        Acute renal failure with tubular necrosis    - HD resumed on previous admission. Anuric on admission and dialyzes M-W-F.  - Nephrology following.    - Goal is for kidney txp in future- currently not a candidate due to health status.  - urine output has improved since 3/16 --> 2.6 L 3/27!  - HD held 3/21 & 3/23  - Nephrology started torsemide 50 mg daily 3/24- continue for now for volume management  - BUN elevated though on TPN, HD 3/29/18   - Will plan HD twice weekly for now   -hold HD 4/3/18 as creatinine improved today  - Hold torsemide for now (d/roselia on 3/31)            VTE Risk Mitigation         Ordered      heparin (porcine) injection 1,000 Units  As needed (PRN)     Route:  Intra-Catheter        03/12/18 1134     heparin (porcine) injection 5,000 Units  Every 8 hours     Route:  Subcutaneous        03/08/18 1622     Medium Risk of VTE  Once      01/11/18 0351     Place sequential compression device  Until discontinued      01/11/18 0351          The patients clinical status was discussed at multidisplinary rounds, involving transplant surgery, transplant medicine, pharmacy, nursing, nutrition, and social work    Discharge Planning:   Monitor kidney function --> may need outpt HD  Monitor HH needs vs rehab for deconditioned status      Fransisca Thomas PA-C  Liver Transplant  [facility]

## 2018-04-03 NOTE — ASSESSMENT & PLAN NOTE
- See other cytomegaloviral disease  - Pentam given 2/24. Plan to give next dose 3/27  - D/C Isavu 3/21.

## 2018-04-03 NOTE — PLAN OF CARE
Problem: Patient Care Overview  Goal: Plan of Care Review  Outcome: Ongoing (interventions implemented as appropriate)    Pt AAOx4 with mother at the bedside.  Pt mother is attentive to and supportive of pt and actively involved in pt care.    L IJ TLC CDI.   Pt receiving nocturnal TPN.   TPN @ 70 mL/hr.   Lipids @ 20.8 mL/hr.   Calorie count.    R SC HD cath CDI.  Last HD 3/29. 0mL off. Plan for HD early this AM.   Pt refused Procrit yesterday.    250 mL in urine output thus far tonight.  No BM    Pt c/o 7/10 lower abdominal pain.     Pt refused heparin shots.  Pt walked the halls again tonight while pushing wheelchair.     Pt remained free from falls or injury thus far.   Bed is in low/ locked position, side rails are up x 2, call light is in reach.   Will continue to monitor.

## 2018-04-03 NOTE — PROGRESS NOTES
[facility]  Liver Transplant  Progress Note    Patient Name: Jhonny Diana  MRN: [id number]  Admission Date: 2018  Hospital Length of Stay: 81 days  Code Status: Full Code  Primary Care Provider: Primary Doctor No  Post-Operative Day: 165    ORGAN:   LIVER  Disease Etiology: Acute Alcoholic Hepatitis  Donor Type:    - Brain Death  CDC High Risk:   No  Donor CMV Status:   Donor CMV Status: Positive  Donor HBcAB:   Negative  Donor HCV Status:   Negative  Whole or Partial: Whole Liver  Biliary Anastomosis: End to End  Arterial Anatomy: Standard  Subjective:     History of Present Illness:  Jhonny Diana is a 26 y/o male with past medical history of alcoholic cirrhosis.  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 Found to have peritonitis in November (WBC 5000, 75% PNM) neg for bile leak. He was treated initially with vanc/cefepime. Repeat cell counts  with some improvement (WBC 1400, 45% PNM). He has undergone multiple paracenteses as well as abscess drainage of perihepatic fluid collections and treated with antimicrobial therapy but no positive cultures. Of note, biliary stricture also identified and ERCP performed on 2017 with sphincterotomy and biliary stent placed. Liver tests still have not normalized despite intervention, bilirubin and AP remain elevated. Other pertinent PMH current wound vac in place 2/2 wound infection, malnutrition requiring TPN for short course and ongoing hypoalbuminemia, and seizure activity while on prograf and has since been switched to cyclosporine without reoccurrence.  He presented to the ER for fever, abdominal pain, and N/V. He reports fever (103) for 1  day prior. Overnight, he developed N/V, reports small amount of green emesis with new left sided pain. He also endorses worsening SOB with exertion. He was scheduled as an outpatient for follow up paracentesis and IR drainage of fluid collection. CXR in ER shows large pleural effusion with subsegmental atelectasis. Infectious work up initiated in ER. His ANC is 900. Broad spectrum antibiotics initiated in ED. At admit, he denied chest pain, palpitations, diarrhea, constipation, back pain, or any sick contacts.    Hospital Course:  Unclear cause of ongoing fevers. Multiple infectious, diagnostic testing completed. Taken to OR for ex lap 2/6, loculated fluid collections found, cultures NGTD. Multiple blood cultures with NGTD. Thoracentesis 1/11, 1/18, 1/31, and 2/16, cultures NGTD. Paracentesis 1/11, 1/19, and 2/5, cultures NGTD. negative fungal markers/quant gold intermediate. Negative TTE.  IR drainage 1/11, cultures NGTD. Continued with intermittent fevers despite broad spectrum antibiotics that were initially started 1/11, then broadened antibiotics 2/22 from vanc/zosyn to ertapenem. Multiple CT scan of abd obtained that were unremarkable. Tagged WBCs scan 2/27 without source of infection found. He had several episodes of neutropenia requiring neupogen, last given 2/28. 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 Monitoring weekly CMV PCR. Chimerism studies obtained 3/1 to assess for GVHD, which was negative, EBV PCR negative, adenovirus negative. In addition to fevers, he has consistently c/o abdominal pain and N/V. Tbili has remain elevated post-op requiring multiple ERCPs. ERCPs performed this admission on 1/30, 2/19, and 3/9. ERCPs with sludge and stones with stents placed. Last ERCP 3/9 with metal stent placed into CBD and stones removed. His ertapenem was d/cd 3/7 due to AMS. Following ERCP 3/9, he was started on cefepime and flagyl  for possible cholangitis. He has remained afebrile since 3/10. He completed cefepime/flagyl after 3 weeks on 3/19 & 3/20. Due to elevated enzymes, he had a liver biopsy 1/23 consistent with cholestatic hepatitis (no cultures obtained during biopsy). GI was consulted for persistent N/V and malnutrition. EGD performed 1/25 and 3/6, EGD 3/6 with normal esophagus congested, erythematous and nodular mucosa in the stomach, one duodenal ulcer with a clean ulcer base (Emir Class III), biopsies taken, negative for CMV and GVHD. Due to N/V and abdominal pain, multiple attempts were made to place SHANE tube with tube feeding, but patient did not tolerate TF. Therefore, he was transitioned to TPN 1/16 then again on 2/24. He also has chronic constipation. Small bowel follow through study with normal findings. Following EGD 1/25, he was started on a PPI, miralax, and Metamucil, but he did not tolerate meds. For appetite stimulation he was trialed on marinol. He also did well with reglan before meals and at bedtime. Early post-op liver transplant surgery, he was switched to cyclosporine for seizures. During this admission, he was transitioned back to prograf 2/24 with keppra on board. Starting 3/1 he developed stuttering speech, disorientation. CT head 3/1 and MRI head 3/6 unremarkable. EEG 3/8 with no seizure activity. He started on rapamune and stress dose steroids 3/10, then ultimately returned to cyclosporine 3/13 and rapamune d/cd same day. Since transitioning back to cyclosporine, his mental status has remained stable. During admission, patient was evaluated by [facility] for future kidney transplant, and he was deemed to be a suitable candidate once medically stable and cleared. He dialyzes on a M-W-F schedule. He was anuric on admission, but starting producing urine around 3/16.  Now producing >2-3L per day.       Interval History: No acute events overnight. Slowly trying to improve caloric intake, goal 300-500 jalen/day, goal is  to wean TPN once 700 jalen/day. Pt able to have full dinner 3/27, 3/28, 3/29 night- halved TPN.  Pt did not eat at all 3/30- increase back to full TPN.  Feeling much better 3/31 and able to eat throughout the day -  Decreased TPN to half.  Of note, abd xray obtained 3/31 for n/v - with distension.  Enema/suppository encouraged but pt felt he was having BM/gas on his own.  D/C Cellcept 3/31- with improvement in nausea- will continue to hold.  Continue reglan and marinol.  Pt reports signif pain today 4/2 with decreasing WBC count- blood/urine cx sent.  Will likely order CT A/P in AM.  Last HD Monday 3/29 (plan is twice weekly until starts clearing). D/C Torsemide 50 mg daily 3/31, continue to hold today.  He remains afebrile off antibiotics. Monitor.     Scheduled Meds:   bisacodyl  10 mg Oral Daily    bisacodyl  10 mg Rectal Once    cycloSPORINE modified  125 mg Oral BID    docusate sodium  100 mg Oral BID    dronabinol  2.5 mg Oral QHS    epoetin maurisio (PROCRIT) injection  12,600 Units Intravenous Every Mon, Wed, Fri    ergocalciferol  50,000 Units Oral Q7 Days    fat emulsion 20%  250 mL Intravenous Once    heparin (porcine)  5,000 Units Subcutaneous Q8H    levetiracetam oral soln  500 mg Oral BID    levothyroxine  75 mcg Oral Before breakfast    metoclopramide HCl  10 mg Oral QID (AC & HS)    omeprazole  40 mg Oral QAM    polyethylene glycol  17 g Oral BID    predniSONE  10 mg Oral Daily    psyllium husk (aspartame)  3.4 g Oral BID    ursodiol  300 mg Oral BID     Continuous Infusions:   TPN ADULT CENTRAL LINE CUSTOM       PRN Meds:sodium chloride 0.9%, acetaminophen, albuterol-ipratropium 2.5mg-0.5mg/3mL, bisacodyl, butalbital-acetaminophen-caffeine -40 mg, dextrose 50%, dextrose 50%, diphenhydrAMINE-zinc acetate 1-0.1%, glucagon (human recombinant), glucose, glucose, heparin (porcine), naloxone, ondansetron, ondansetron, oxyCODONE, oxyCODONE, prochlorperazine, prochlorperazine, simethicone,  sodium chloride 0.9%, sodium chloride 0.9%    Review of Systems   Constitutional: Positive for activity change, appetite change and fatigue. Negative for chills and fever.   HENT: Negative for congestion and facial swelling.    Eyes: Negative for pain, discharge and visual disturbance.   Respiratory: Negative.  Negative for cough, chest tightness, shortness of breath and wheezing.    Cardiovascular: Negative.  Negative for chest pain, palpitations and leg swelling.   Gastrointestinal: Positive for abdominal distention and abdominal pain. Negative for constipation, diarrhea, nausea and vomiting.   Endocrine: Negative.    Genitourinary: Negative for decreased urine volume, difficulty urinating, dysuria, hematuria and urgency.   Musculoskeletal: Negative for back pain, gait problem, neck pain and neck stiffness.   Skin: Negative for color change and pallor.   Allergic/Immunologic: Positive for immunocompromised state.   Neurological: Negative for dizziness, seizures, weakness, light-headedness and headaches.   Psychiatric/Behavioral: Negative for behavioral problems, confusion, dysphoric mood, hallucinations, sleep disturbance and suicidal ideas. The patient is not nervous/anxious.      Objective:     Vital Signs (Most Recent):  Temp: 98.7 °F (37.1 °C) (04/02/18 1600)  Pulse: 84 (04/02/18 1600)  Resp: 18 (04/02/18 1600)  BP: (!) 157/94 (04/02/18 1600)  SpO2: 96 % (04/02/18 1600) Vital Signs (24h Range):  Temp:  [98.7 °F (37.1 °C)-99 °F (37.2 °C)] 98.7 °F (37.1 °C)  Pulse:  [82-88] 84  Resp:  [16-20] 18  SpO2:  [94 %-98 %] 96 %  BP: (145-161)/(83-97) 157/94     Weight: 61 kg (134 lb 7.7 oz)  Body mass index is 21.72 kg/m².    Intake/Output - Last 3 Shifts       03/31 0700 - 04/01 0659 04/01 0700 - 04/02 0659 04/02 0700 - 04/03 0659    P.O. 550 350 600    I.V. (mL/kg) 20 (0.3)      Other 0      IV Piggyback  50     .5 209 247    Total Intake(mL/kg) 1088.5 (17.1) 609 (10) 847 (13.9)    Urine (mL/kg/hr) 1150 (0.8)  1225 (0.8) 900 (1.2)    Emesis/NG output 0 (0) 0 (0)     Other 0 (0) 0 (0)     Stool 0 (0) 0 (0) 0 (0)    Blood 0 (0) 0 (0)     Total Output 1150 1225 900    Net -61.5 -616 -53           Urine Occurrence 0 x 0 x     Stool Occurrence 0 x 1 x 0 x    Emesis Occurrence 0 x 1 x           Physical Exam   Constitutional: He is oriented to person, place, and time. He appears well-developed.   Hand and temporal muscle wasting   HENT:   Head: Normocephalic.   Eyes: Pupils are equal, round, and reactive to light. No scleral icterus.   Cardiovascular: Normal rate, regular rhythm, normal heart sounds and intact distal pulses.    Pulmonary/Chest: Effort normal and breath sounds normal. No respiratory distress. He has no wheezes. He has no rhonchi. He has no rales.   Abdominal: Soft. Bowel sounds are normal. He exhibits distension. There is no tenderness.   Musculoskeletal: Normal range of motion. He exhibits edema (generalized).   Neurological: He is alert and oriented to person, place, and time.   Skin: Skin is warm and dry.   Psychiatric: He has a normal mood and affect. His behavior is normal. Judgment and thought content normal.   Nursing note and vitals reviewed.      Laboratory:  Immunosuppressants         Stop Route Frequency     cycloSPORINE modified capsule 125 mg      -- Oral 2 times daily     cycloSPORINE modified (NEORAL) 25 MG capsule      03/09 1714       cycloSPORINE modified (NEORAL) 100 MG capsule      03/09 1714       cycloSPORINE modified (NEORAL) 100 MG capsule      03/09 0514       cycloSPORINE modified (NEORAL) 25 MG capsule      03/09 0514          CBC:     Recent Labs  Lab 04/02/18  0445   WBC 3.01*   RBC 2.67*   HGB 8.5*   HCT 27.1*   PLT 81*   *   MCH 31.8*   MCHC 31.4*     CMP:     Recent Labs  Lab 04/02/18  0445   GLU 93   CALCIUM 9.3   ALBUMIN 2.8*   PROT 6.0      K 3.9   CO2 25      BUN 80*   CREATININE 3.7*   ALKPHOS 111   ALT 9*   AST 19   BILITOT 1.1*     Labs within the past 24  hours have been reviewed.    Diagnostic Results:  I have personally reviewed all pertinent imaging studies.    Assessment/Plan:     Liver transplanted    - Post op course complicated by fevers and hyperbilirubinemia, AST/ALT stable.  - see biliary stricture of transplanted liver  - Liver biopsy 1/23 without rejection.  - Paracentesis 3/6 negative for infection, wbc 28 segs 3%.  - ERCP, EUS biopsy 3/9/18 - no rejection, mild cholestatic hepatitis seen.   - T bili normalized        Long-term use of immunosuppressant medication    - Maintenance IS with cyclosporine --> transitioned to Prograf 2/24 for continued nausea but did not tolerate from neuro standpoint.  PT IS NOT A CANDIDATE FOR TACROLIMUS.    - Transitioned back to cyclosporine 3/1  - D/c cyclosporine, started rapa & stress dose steroids 3/10 for continued confusion  - Mental status now improved, re-trial cyclosporine 3/13, d/c rapa 3/13  - Transition back to prednisone taper from stress dose steroids 3/15 --> will decrease by 5 mg q week (decrease to 5 mg 4/6).  - Restarted low dose MMF 3/23- with nausea --> D/C Cellcept 3/31 --> nausea improved --> continue to hold as has passed protocol for Cellcept        Prophylactic immunotherapy    - See long term use of immunosuppression.         At risk for opportunistic infections    - See other cytomegaloviral disease  - Pentam given 2/24. Plan to give next dose 3/27  - D/C Isavu 3/21.        Vitamin D deficiency    - Continue ergo 50K weekly.         Other cytomegaloviral diseases    - detected at 370 on CMV PCR 2/1.  - case discussed with ID and will hold off on treatment at this time given low WBC.   - undetected CMV PCR 2/8, 2/15, 2/22, 3/8   - continue Valcyte 200 mg every Mon, Wed, Fri.  - Per ID, valcyte dose decreased to ppx dose on 2/18--Valcyte d/c'd 2/25  - Repeat scope 3/6 to assess persistent N/V and possible CMV causing these symptoms. Biopsies thus far normal, cmv stains negative.  - continue to  monitor weekly CMV PCR's, last PCR neg from 3/15, recent level from 3/22 undetected.  - CMV 3/29 undetected.        Constipation    - chronic  - encourage ambulation, stool softeners and laxatives, often refuses some meds 2/2 nausea, will often require suppository or brown bomb enema to have a BM.  - Small bowel follow through with normal findings.  - Avoid narcotics -need to ween        Severe protein-calorie malnutrition    - Poor PO intake since transplant requiring short course of TPN during previous hospital stay.   - Due to N/V and abdominal pain, multiple attempts were made to place SHANE tube with tube feeding, but patient did not tolerate TF  - Transitioned to TPN 1/16 then again on 2/24 due to poor tolerance of SHANE tube/TF  - prealbumin 7 on 1/15 --> prealbumin 22 on 3/19, monitoring weekly (Mondays)  - EGD performed 1/25 and 3/6, gastroparesis, duodenal ulcer, and congested/erythematous gastric mucosa  - Small bowel through with normal findings.  - Consulted GI for GJ tube placement, but was canceled due to ongoing fevers  - Hold Reglan and Marinol 3/8 for neuro symptoms --> restarted reglan and marinol 3/19, will need to dc marinol before discharge because not covered by insurance.  - set goals for caloric intake with patient, continue nocturnal TPN started 3/20 PM to encourage appetite during the day  - Trying to set small caloric goals daily, for now goal is 300-500 jalen/day. Ultimately needs to consume 700 jaeln/day to start weaning TPN.   - Ween TPN to half 3/30 --> back up to full 3/31 as not eating -> decrease again since ate 3/31        Nausea and vomiting    - h/o constipation, abdominal pain, and N/V  - worsened with SHANE tube and TF --> now on TPN  - encourage bowel regimen  - EGD performed 1/25 and 3/6, gastroparesis, duodenal ulcer, and congested/erythematous gastric mucosa  - Small bowel through with normal findings  - Continue reglan and marinol scheduled, anti-emetics PRN.   - If cont with GI  symptoms then may need to consider Flex sig/ C-scope to further eval.   - Abd xray with distended colon - did not want enema  - D/C Cellcept 3/31  - Improved today        Biliary stricture of transplanted liver    - ERCP 12/6 with post-anastomosis stricture with stent placement.  - Tbili with increase prompting ERCP 1/30 with stones and sludge, stents placed.  - Repeat ERCP 2/19 as was having recurrent fevers with bilirubin stuck at 2.0-2.2 with overall unremarkable findings seen. Did place new larger stents.   - ERCP/EUS 3/9 with metal stent placed in bile duct, stones removed.  - Tbili remains stable.   - Continue actigall.        Anemia of chronic disease    - Continue Procrit  - Monitor daily labs. H/H stable, transfuse PRN.         Delayed surgical wound healing    - Wd vac removed 1/12/18.    - Wound healing well.   - Changing dressing q Tuesday, last 3/27.        Seizure    - 3/1, patient with stuttering speech, trouble recalling specific words when speaking. Does have hx of seizure (on keppra) previously while on prograf. Switched to prograf over the weekend from cyclosporine. Neuro exam unremarkable.   - CT head without contrast obtained, unremarkable.   - In light of these symptoms, prograf d/c, restart cyclosporine 3/1.  - remains w/o seizure. Cont to have stutter/garbled speech/difficulty finding words.  Worse post EGD as sedation was given.  - MRI 3/6 unremarkable.  - EEG completed 3/8 - mild, generalized, non-specific cerebral dysfunction, no epileptiform activity.  - monitor closely. VSS.  - Should d/w neuro this week decreasing Keppra        Acute renal failure with tubular necrosis    - HD resumed on previous admission. Anuric on admission and dialyzes M-W-F.  - Nephrology following.    - Goal is for kidney txp in future- currently not a candidate due to health status.  - urine output has improved since 3/16 --> 2.6 L 3/27!  - HD held 3/21 & 3/23  - Nephrology started torsemide 50 mg daily 3/24-  continue for now for volume management  - BUN elevated though on TPN, HD 3/29/18   - Will plan HD twice weekly for now  - Hold torsemide for now (d/roselia on 3/31)            VTE Risk Mitigation         Ordered     heparin (porcine) injection 1,000 Units  As needed (PRN)     Route:  Intra-Catheter        03/12/18 1134     heparin (porcine) injection 5,000 Units  Every 8 hours     Route:  Subcutaneous        03/08/18 1622     Medium Risk of VTE  Once      01/11/18 0351     Place sequential compression device  Until discontinued      01/11/18 0351          The patients clinical status was discussed at multidisplinary rounds, involving transplant surgery, transplant medicine, pharmacy, nursing, nutrition, and social work    Discharge Planning:  Monitor kidney function --> may need outpt HD  Monitor HH needs vs rehab for deconditioned status      Geronimo Vera PA-C  Liver Transplant  [facility]

## 2018-04-03 NOTE — SUBJECTIVE & OBJECTIVE
Scheduled Meds:   sodium chloride 0.9%   Intravenous Once    bisacodyl  10 mg Oral Daily    cycloSPORINE modified  125 mg Oral BID    docusate sodium  100 mg Oral BID    dronabinol  2.5 mg Oral QHS    epoetin maurisio (PROCRIT) injection  12,600 Units Intravenous Every Mon, Wed, Fri    ergocalciferol  50,000 Units Oral Q7 Days    fat emulsion 20%  250 mL Intravenous Once    heparin (porcine)  5,000 Units Subcutaneous Q8H    levetiracetam oral soln  500 mg Oral BID    levothyroxine  75 mcg Oral Before breakfast    metoclopramide HCl  10 mg Oral QID (AC & HS)    omeprazole  40 mg Oral QAM    predniSONE  10 mg Oral Daily    sevelamer carbonate  800 mg Oral TID WM    ursodiol  300 mg Oral BID     Continuous Infusions:   TPN ADULT CENTRAL LINE CUSTOM       PRN Meds:sodium chloride 0.9%, acetaminophen, albuterol-ipratropium 2.5mg-0.5mg/3mL, bisacodyl, butalbital-acetaminophen-caffeine -40 mg, dextrose 50%, dextrose 50%, diphenhydrAMINE-zinc acetate 1-0.1%, glucagon (human recombinant), glucose, glucose, heparin (porcine), naloxone, ondansetron, ondansetron, oxyCODONE, oxyCODONE, prochlorperazine, prochlorperazine, simethicone, sodium chloride 0.9%, sodium chloride 0.9%    Review of Systems   Constitutional: Positive for activity change, appetite change and fatigue. Negative for chills and fever.   HENT: Negative for congestion and facial swelling.    Eyes: Negative for pain, discharge and visual disturbance.   Respiratory: Negative.  Negative for cough, chest tightness, shortness of breath and wheezing.    Cardiovascular: Negative.  Negative for chest pain, palpitations and leg swelling.   Gastrointestinal: Positive for abdominal distention and abdominal pain. Negative for constipation, diarrhea, nausea and vomiting.   Endocrine: Negative.    Genitourinary: Negative for decreased urine volume, difficulty urinating, dysuria, hematuria and urgency.   Musculoskeletal: Negative for back pain, gait problem,  neck pain and neck stiffness.   Skin: Negative for color change and pallor.   Allergic/Immunologic: Positive for immunocompromised state.   Neurological: Negative for dizziness, seizures, weakness, light-headedness and headaches.   Psychiatric/Behavioral: Negative for behavioral problems, confusion, dysphoric mood, hallucinations, sleep disturbance and suicidal ideas. The patient is not nervous/anxious.      Objective:     Vital Signs (Most Recent):  Temp: 98.2 °F (36.8 °C) (04/03/18 1153)  Pulse: 76 (04/03/18 1153)  Resp: 18 (04/03/18 1153)  BP: 137/82 (04/03/18 1153)  SpO2: 99 % (04/03/18 1153) Vital Signs (24h Range):  Temp:  [98.2 °F (36.8 °C)-99.1 °F (37.3 °C)] 98.2 °F (36.8 °C)  Pulse:  [76-85] 76  Resp:  [18] 18  SpO2:  [94 %-99 %] 99 %  BP: (134-157)/(78-94) 137/82     Weight: 61 kg (134 lb 7.7 oz)  Body mass index is 21.72 kg/m².    Intake/Output - Last 3 Shifts       04/01 0700 - 04/02 0659 04/02 0700 - 04/03 0659 04/03 0700 - 04/04 0659    P.O. 350 1050 360    I.V. (mL/kg)       Other       IV Piggyback 50       859.1     Total Intake(mL/kg) 609 (10) 1909.1 (31.3) 360 (5.9)    Urine (mL/kg/hr) 1225 (0.8) 1650 (1.1) 100 (0.3)    Emesis/NG output 0 (0)      Other 0 (0)      Stool 0 (0) 0 (0) 0 (0)    Blood 0 (0)      Total Output 1225 1650 100    Net -616 +259.1 +260           Urine Occurrence 0 x      Stool Occurrence 1 x 0 x 0 x    Emesis Occurrence 1 x            Physical Exam   Constitutional: He is oriented to person, place, and time. He appears well-developed.   Hand and temporal muscle wasting   HENT:   Head: Normocephalic.   Eyes: Pupils are equal, round, and reactive to light. No scleral icterus.   Cardiovascular: Normal rate, regular rhythm, normal heart sounds and intact distal pulses.    Pulmonary/Chest: Effort normal and breath sounds normal. No respiratory distress. He has no wheezes. He has no rhonchi. He has no rales.   Abdominal: Soft. Bowel sounds are normal. He exhibits distension.  There is no tenderness.   Musculoskeletal: Normal range of motion. He exhibits edema (generalized).   Neurological: He is alert and oriented to person, place, and time.   Skin: Skin is warm and dry.   Psychiatric: He has a normal mood and affect. His behavior is normal. Judgment and thought content normal.   Nursing note and vitals reviewed.      Laboratory:  Immunosuppressants         Stop Route Frequency     cycloSPORINE modified capsule 125 mg      -- Oral 2 times daily     cycloSPORINE modified (NEORAL) 25 MG capsule      03/09 1714       cycloSPORINE modified (NEORAL) 100 MG capsule      03/09 1714       cycloSPORINE modified (NEORAL) 100 MG capsule      03/09 0514       cycloSPORINE modified (NEORAL) 25 MG capsule      03/09 0514          CBC:   Recent Labs  Lab 04/03/18  0400   WBC 2.89*   RBC 2.48*   HGB 8.4*   HCT 25.5*   PLT 74*   *   MCH 33.9*   MCHC 32.9     CMP:   Recent Labs  Lab 04/03/18  0400      CALCIUM 8.9   ALBUMIN 2.7*   PROT 5.9*      K 3.8   CO2 26      BUN 85*   CREATININE 3.6*   ALKPHOS 107   ALT 7*   AST 17   BILITOT 1.1*     Labs within the past 24 hours have been reviewed.    Diagnostic Results:  pertinent imaging reviewed

## 2018-04-04 ENCOUNTER — TELEPHONE (OUTPATIENT)
Dept: GASTROENTEROLOGY | Facility: CLINIC | Age: 28
End: 2018-04-04

## 2018-04-04 DIAGNOSIS — K83.1 BILIARY STRICTURE: Primary | ICD-10-CM

## 2018-04-04 PROBLEM — E83.39 HYPERPHOSPHATEMIA: Status: ACTIVE | Noted: 2018-04-04

## 2018-04-04 LAB
ALBUMIN SERPL BCP-MCNC: 2.9 G/DL
ALP SERPL-CCNC: 117 U/L
ALT SERPL W/O P-5'-P-CCNC: 6 U/L
ANION GAP SERPL CALC-SCNC: 13 MMOL/L
AST SERPL-CCNC: 16 U/L
BASOPHILS # BLD AUTO: 0.01 K/UL
BASOPHILS NFR BLD: 0.4 %
BILIRUB SERPL-MCNC: 1 MG/DL
BUN SERPL-MCNC: 95 MG/DL
CALCIUM SERPL-MCNC: 9.4 MG/DL
CHLORIDE SERPL-SCNC: 101 MMOL/L
CO2 SERPL-SCNC: 26 MMOL/L
CREAT SERPL-MCNC: 3.9 MG/DL
CYCLOSPORINE BLD LC/MS/MS-MCNC: 176 NG/ML
DIFFERENTIAL METHOD: ABNORMAL
EOSINOPHIL # BLD AUTO: 0.1 K/UL
EOSINOPHIL NFR BLD: 3.4 %
ERYTHROCYTE [DISTWIDTH] IN BLOOD BY AUTOMATED COUNT: 15.9 %
EST. GFR  (AFRICAN AMERICAN): 22.7 ML/MIN/1.73 M^2
EST. GFR  (NON AFRICAN AMERICAN): 19.7 ML/MIN/1.73 M^2
GLUCOSE SERPL-MCNC: 109 MG/DL
HCT VFR BLD AUTO: 25.8 %
HGB BLD-MCNC: 8.3 G/DL
IMM GRANULOCYTES # BLD AUTO: 0.01 K/UL
IMM GRANULOCYTES NFR BLD AUTO: 0.4 %
LYMPHOCYTES # BLD AUTO: 0.9 K/UL
LYMPHOCYTES NFR BLD: 34.6 %
MAGNESIUM SERPL-MCNC: 2.1 MG/DL
MCH RBC QN AUTO: 33.1 PG
MCHC RBC AUTO-ENTMCNC: 32.2 G/DL
MCV RBC AUTO: 103 FL
MONOCYTES # BLD AUTO: 0.4 K/UL
MONOCYTES NFR BLD: 13.3 %
NEUTROPHILS # BLD AUTO: 1.3 K/UL
NEUTROPHILS NFR BLD: 47.9 %
NRBC BLD-RTO: 0 /100 WBC
PHOSPHATE SERPL-MCNC: 7.4 MG/DL
PLATELET # BLD AUTO: 79 K/UL
PMV BLD AUTO: 11.4 FL
POTASSIUM SERPL-SCNC: 4.1 MMOL/L
PROT SERPL-MCNC: 6.2 G/DL
RBC # BLD AUTO: 2.51 M/UL
SODIUM SERPL-SCNC: 140 MMOL/L
VIT C SERPL-MCNC: <1 MG/L (ref 2–19)
WBC # BLD AUTO: 2.63 K/UL

## 2018-04-04 PROCEDURE — 63600175 PHARM REV CODE 636 W HCPCS: Performed by: PHYSICIAN ASSISTANT

## 2018-04-04 PROCEDURE — 25000003 PHARM REV CODE 250: Performed by: NURSE PRACTITIONER

## 2018-04-04 PROCEDURE — 20600001 HC STEP DOWN PRIVATE ROOM

## 2018-04-04 PROCEDURE — 83735 ASSAY OF MAGNESIUM: CPT

## 2018-04-04 PROCEDURE — 84100 ASSAY OF PHOSPHORUS: CPT

## 2018-04-04 PROCEDURE — 80053 COMPREHEN METABOLIC PANEL: CPT

## 2018-04-04 PROCEDURE — 94664 DEMO&/EVAL PT USE INHALER: CPT

## 2018-04-04 PROCEDURE — 80158 DRUG ASSAY CYCLOSPORINE: CPT

## 2018-04-04 PROCEDURE — 25000003 PHARM REV CODE 250: Performed by: PHYSICIAN ASSISTANT

## 2018-04-04 PROCEDURE — 25000003 PHARM REV CODE 250

## 2018-04-04 PROCEDURE — 99233 SBSQ HOSP IP/OBS HIGH 50: CPT | Mod: ,,, | Performed by: PHYSICIAN ASSISTANT

## 2018-04-04 PROCEDURE — 85025 COMPLETE CBC W/AUTO DIFF WBC: CPT

## 2018-04-04 PROCEDURE — 99900035 HC TECH TIME PER 15 MIN (STAT)

## 2018-04-04 PROCEDURE — 99233 SBSQ HOSP IP/OBS HIGH 50: CPT | Mod: ,,, | Performed by: NURSE PRACTITIONER

## 2018-04-04 PROCEDURE — A4217 STERILE WATER/SALINE, 500 ML: HCPCS | Performed by: PHYSICIAN ASSISTANT

## 2018-04-04 PROCEDURE — 63600175 PHARM REV CODE 636 W HCPCS: Performed by: NURSE PRACTITIONER

## 2018-04-04 RX ADMIN — LEVOTHYROXINE SODIUM 75 MCG: 75 TABLET ORAL at 05:04

## 2018-04-04 RX ADMIN — MAGNESIUM SULFATE HEPTAHYDRATE: 500 INJECTION, SOLUTION INTRAMUSCULAR; INTRAVENOUS at 09:04

## 2018-04-04 RX ADMIN — METOCLOPRAMIDE HYDROCHLORIDE 10 MG: 5 SOLUTION ORAL at 09:04

## 2018-04-04 RX ADMIN — BISACODYL 10 MG: 5 TABLET, COATED ORAL at 08:04

## 2018-04-04 RX ADMIN — METOCLOPRAMIDE HYDROCHLORIDE 10 MG: 5 SOLUTION ORAL at 11:04

## 2018-04-04 RX ADMIN — METOCLOPRAMIDE HYDROCHLORIDE 10 MG: 5 SOLUTION ORAL at 05:04

## 2018-04-04 RX ADMIN — OXYCODONE HYDROCHLORIDE 5 MG: 5 TABLET ORAL at 11:04

## 2018-04-04 RX ADMIN — URSODIOL 300 MG: 300 CAPSULE ORAL at 09:04

## 2018-04-04 RX ADMIN — OXYCODONE HYDROCHLORIDE 10 MG: 5 TABLET ORAL at 04:04

## 2018-04-04 RX ADMIN — OXYCODONE HYDROCHLORIDE 10 MG: 5 TABLET ORAL at 09:04

## 2018-04-04 RX ADMIN — DOCUSATE SODIUM 100 MG: 100 CAPSULE, LIQUID FILLED ORAL at 09:04

## 2018-04-04 RX ADMIN — URSODIOL 300 MG: 300 CAPSULE ORAL at 08:04

## 2018-04-04 RX ADMIN — OMEPRAZOLE 40 MG: 40 CAPSULE, DELAYED RELEASE ORAL at 06:04

## 2018-04-04 RX ADMIN — PREDNISONE 10 MG: 10 TABLET ORAL at 08:04

## 2018-04-04 RX ADMIN — LEVETIRACETAM 500 MG: 100 SOLUTION ORAL at 08:04

## 2018-04-04 RX ADMIN — DRONABINOL 2.5 MG: 2.5 CAPSULE ORAL at 09:04

## 2018-04-04 RX ADMIN — OXYCODONE HYDROCHLORIDE 10 MG: 5 TABLET ORAL at 10:04

## 2018-04-04 RX ADMIN — CYCLOSPORINE 125 MG: 100 CAPSULE, LIQUID FILLED ORAL at 08:04

## 2018-04-04 RX ADMIN — CYCLOSPORINE 125 MG: 100 CAPSULE, LIQUID FILLED ORAL at 05:04

## 2018-04-04 RX ADMIN — LEVETIRACETAM 500 MG: 100 SOLUTION ORAL at 09:04

## 2018-04-04 RX ADMIN — SEVELAMER CARBONATE 800 MG: 800 TABLET, FILM COATED ORAL at 05:04

## 2018-04-04 RX ADMIN — DOCUSATE SODIUM 100 MG: 100 CAPSULE, LIQUID FILLED ORAL at 08:04

## 2018-04-04 NOTE — ASSESSMENT & PLAN NOTE
- Poor PO intake since transplant requiring short course of TPN during previous hospital stay.   - Due to N/V and abdominal pain, multiple attempts were made to place SHANE tube with tube feeding, but patient did not tolerate TF  - Transitioned to TPN 1/16 then again on 2/24 due to poor tolerance of SHANE tube/TF  - prealbumin 7 on 1/15 --> prealbumin 22 on 3/19, monitoring weekly (Mondays)  - EGD performed 1/25 and 3/6, gastroparesis, duodenal ulcer, and congested/erythematous gastric mucosa  - Small bowel through with normal findings.  - Consulted GI for GJ tube placement, but was canceled due to ongoing fevers  - Hold Reglan and Marinol 3/8 for neuro symptoms --> restarted reglan and marinol 3/19, will need to dc marinol before discharge because not covered by insurance.  - set goals for caloric intake with patient, continue nocturnal TPN started 3/20 PM to encourage appetite during the day  - Trying to set small caloric goals daily, for now goal is 300-500 jalen/day. Ultimately needs to consume 700 jalen/day to start weaning TPN.    -met with dietician 4/3/18 to discuss dietary goals. Intake significantly improved.   - Continue half dose TPN for now

## 2018-04-04 NOTE — PROGRESS NOTES
[facility]  Nephrology  Progress Note    Patient Name: Jhonny Diana  MRN: [id number]  Admission Date: 1/11/2018  Hospital Length of Stay: 83 days  Attending Provider: Solis Chapa MD   Primary Care Physician: Primary Doctor No  Principal Problem:Fever    Subjective:     HPI: Jhonny Diana is a 26 y/o  male with PMHx relevant for ETOH cirrhosis.  S/p DDLT 10/19/2017; c/b seizures (swtiched off prograf to cyclo), ATN dialysis dependant in iHD MWF, last HD on 1/10, anuric, superficial wound infection s/p wound vac to chevron incision, and multiple admissions for fevers finally dx with peritonitis in November (WBC 5000, 75% PNM) neg for bile leak. HE gets recurrent paracentesis and has peritoneal drain. Other pertinent PMH current wound vac in place 2/2 wound infection, malnutrition requiring TPN for short course and ongoing hypoalbuminemia, and seizure activity while on prograf and has since been switched to cyclosporine without reoccurrence.  He is admitted to LTx service secondary to fever, abdominal pain, and N/V. He reports fever (103) for 1 day prior. Overnight, he developed N/V, reports small amount of green emesis with new left sided pain. He also endorses worsening SOB with exertion. He was scheduled as an outpatient for follow up paracentesis and IR drainage of fluid collection. CXR in ER shows large pleural effusion with subsegmental atelectasis. His ANC is 900. Broad spectrum antibiotics initiated in ED. Nephrology consulted for LAURA dialysis dependant co management. He dialyses at [facility] under the care of Dr. Peterson via [facility] rowe.      Interval History:   HD treatment held yesterday due to slight decrease in SCr.  This morning, SCr up to 3.9 without other electrolyte abnormalities.  UOP recorded yesterday at 600 ml/24h.  No complaints voiced this morning, oxygenating well on RA.    Review of patient's allergies indicates:   Allergen Reactions    Bactrim  [sulfamethoxazole-trimethoprim] Other (See Comments)     Mookie Trell Syndrome     Current Facility-Administered Medications   Medication Frequency    0.9%  NaCl infusion PRN    0.9%  NaCl infusion Once    acetaminophen tablet 650 mg Q6H PRN    albuterol-ipratropium 2.5mg-0.5mg/3mL nebulizer solution 3 mL Q4H PRN    bisacodyl EC tablet 10 mg Daily    bisacodyl suppository 10 mg Daily PRN    butalbital-acetaminophen-caffeine -40 mg per tablet 1 tablet Q4H PRN    cycloSPORINE modified capsule 125 mg BID    dextrose 50% injection 12.5 g PRN    dextrose 50% injection 25 g PRN    diphenhydrAMINE-zinc acetate 1-0.1% cream TID PRN    docusate sodium capsule 100 mg BID    dronabinol capsule 2.5 mg QHS    epoetin maurisio injection 12,600 Units Every Mon, Wed, Fri    ergocalciferol capsule 50,000 Units Q7 Days    fat emulsion 20% infusion 250 mL Once    glucagon (human recombinant) injection 1 mg PRN    glucose chewable tablet 16 g PRN    glucose chewable tablet 24 g PRN    heparin (porcine) injection 1,000 Units PRN    heparin (porcine) injection 5,000 Units Q8H    levetiracetam oral soln Soln 500 mg BID    levothyroxine tablet 75 mcg Before breakfast    metoclopramide HCl 5 mg/5 mL solution 10 mg QID (AC & HS)    naloxone 0.4 mg/mL injection 0.2 mg PRN    omeprazole capsule 40 mg QAM    ondansetron disintegrating tablet 8 mg Q6H PRN    ondansetron injection 4 mg Q6H PRN    oxyCODONE immediate release tablet 10 mg Q4H PRN    oxyCODONE immediate release tablet 5 mg Nightly PRN    predniSONE tablet 10 mg Daily    prochlorperazine injection Soln 10 mg Q6H PRN    prochlorperazine tablet 5 mg TID PRN    sevelamer carbonate tablet 800 mg TID WM    simethicone chewable tablet 80 mg TID PRN    sodium chloride 0.9% flush 3 mL PRN    sodium chloride 0.9% flush 3 mL PRN    TPN ADULT CENTRAL LINE CUSTOM Continuous    ursodiol oral suspension (conc: 60 mg/mL) 300 mg BID       Objective:      Vital Signs (Most Recent):  Temp: 98.5 °F (36.9 °C) (04/04/18 0418)  Pulse: 76 (04/04/18 0418)  Resp: 18 (04/04/18 0418)  BP: 120/78 (04/04/18 0418)  SpO2: 96 % (04/04/18 0418)  O2 Device (Oxygen Therapy): room air (04/03/18 2011) Vital Signs (24h Range):  Temp:  [98.2 °F (36.8 °C)-98.8 °F (37.1 °C)] 98.5 °F (36.9 °C)  Pulse:  [69-80] 76  Resp:  [16-18] 18  SpO2:  [95 %-99 %] 96 %  BP: (120-147)/(78-86) 120/78     Weight: 63.4 kg (139 lb 12.4 oz) (04/04/18 0500)  Body mass index is 22.57 kg/m².  Body surface area is 1.72 meters squared.    I/O last 3 completed shifts:  In: 3010.9 [P.O.:1610]  Out: 1350 [Urine:1350]    Physical Exam   Constitutional: He is oriented to person, place, and time. He appears well-developed. No distress.   HENT:   Head: Normocephalic and atraumatic.   Right Ear: External ear normal.   Left Ear: External ear normal.   Mouth/Throat: No oropharyngeal exudate.   Eyes: Conjunctivae and EOM are normal. Right eye exhibits no discharge. Left eye exhibits no discharge. No scleral icterus.   Neck: Normal range of motion. Neck supple.   Cardiovascular: Normal rate and regular rhythm.  Exam reveals no gallop.    No murmur heard.  Pulmonary/Chest: Effort normal and breath sounds normal. No respiratory distress. He has no wheezes. He has no rales.   Abdominal: Soft. There is no tenderness. There is no rebound.   Musculoskeletal: Normal range of motion. He exhibits no edema or deformity.   Lymphadenopathy:     He has no cervical adenopathy.   Neurological: He is alert and oriented to person, place, and time.   Skin: Skin is warm and dry. No rash noted. He is not diaphoretic. No erythema.       Significant Labs:  CBC:   Recent Labs  Lab 04/04/18  0500   WBC 2.63*   RBC 2.51*   HGB 8.3*   HCT 25.8*   PLT 79*   *   MCH 33.1*   MCHC 32.2     CMP:   Recent Labs  Lab 04/04/18  0500      CALCIUM 9.4   ALBUMIN 2.9*   PROT 6.2      K 4.1   CO2 26      BUN 95*   CREATININE 3.9*    ALKPHOS 117   ALT 6*   AST 16   BILITOT 1.0            Assessment/Plan:     Acute renal failure with tubular necrosis    LAURA dialysisi dependant since 1016/2017  HD on [facility] under the care of Dr. Peterson  On HD for: 2.5 mo since 10/16/2017  Duration of outpatient dialysis session - 3.5 hrs  EDW - TBD  Residual Renal Function - yes     HD held yesterday due to slight decrease in SCr.  Unfortunately this morning, SCr continues to rise with an increase to 3.9 from 3.6.  No signs of uremia on examination    Plan:  Will plan for HD treatment tomorrow for metabolic clearance unless there is a significant improvement in his SCr.      Anemia of CKD  -continue VIDA.    BMM  Phos elevated at 7.4.    Continue sevelamer with meals.  Continue Ergo               Derrick Love NP  Nephrology  [facility]  Pager:  [phone]

## 2018-04-04 NOTE — ASSESSMENT & PLAN NOTE
- HD resumed on previous admission. Anuric on admission and dialyzes M-W-F.  - Nephrology following.    - Goal is for kidney txp in future- currently not a candidate due to health status.  - urine output has improved since 3/16 --> 2.6 L 3/27!  - HD held 3/21 & 3/23  - Nephrology started torsemide 50 mg daily 3/24- continue for now for volume management  - BUN elevated though on TPN, HD 3/29/18   - Will plan HD twice weekly for now   HD held 4/3, plan for HD 4/5  - Hold torsemide for now (d/roselia on 3/31)

## 2018-04-04 NOTE — PLAN OF CARE
Problem: Patient Care Overview  Goal: Plan of Care Review  Outcome: Ongoing (interventions implemented as appropriate)  Patient is AAOx3, independent. Patient c/o abdominal pain and but denies nausea. PRN meds given per orders. Patient continues to receive nocturnal TPN. Encouraging small, frequent meals during the day. Patient did not eat breakfast or  Lunch yet. Patient is scheduled for HD tomorrow.  Patient refused the Heparin shot. Encouraging the patient to get OOB and walk in the halls with his mother. Patient was walked in the halls twice today with his mom and has been sitting up on th sofa all day today. Reminded the patient and his mother to call for assistance. Call light and personal items are within reach.

## 2018-04-04 NOTE — SUBJECTIVE & OBJECTIVE
Scheduled Meds:   sodium chloride 0.9%   Intravenous Once    bisacodyl  10 mg Oral Daily    cycloSPORINE modified  125 mg Oral BID    docusate sodium  100 mg Oral BID    dronabinol  2.5 mg Oral QHS    epoetin maurisio (PROCRIT) injection  12,600 Units Intravenous Every Mon, Wed, Fri    ergocalciferol  50,000 Units Oral Q7 Days    heparin (porcine)  5,000 Units Subcutaneous Q8H    levetiracetam oral soln  500 mg Oral BID    levothyroxine  75 mcg Oral Before breakfast    metoclopramide HCl  10 mg Oral QID (AC & HS)    omeprazole  40 mg Oral QAM    predniSONE  10 mg Oral Daily    sevelamer carbonate  800 mg Oral TID WM    ursodiol  300 mg Oral BID     Continuous Infusions:  PRN Meds:sodium chloride 0.9%, acetaminophen, albuterol-ipratropium 2.5mg-0.5mg/3mL, bisacodyl, butalbital-acetaminophen-caffeine -40 mg, dextrose 50%, dextrose 50%, diphenhydrAMINE-zinc acetate 1-0.1%, glucagon (human recombinant), glucose, glucose, heparin (porcine), naloxone, ondansetron, ondansetron, oxyCODONE, oxyCODONE, prochlorperazine, prochlorperazine, simethicone, sodium chloride 0.9%, sodium chloride 0.9%    Review of Systems   Constitutional: Positive for activity change, appetite change and fatigue. Negative for chills and fever.   HENT: Negative for congestion and facial swelling.    Eyes: Negative for pain, discharge and visual disturbance.   Respiratory: Negative.  Negative for cough, chest tightness, shortness of breath and wheezing.    Cardiovascular: Negative.  Negative for chest pain, palpitations and leg swelling.   Gastrointestinal: Positive for abdominal distention and abdominal pain. Negative for constipation, diarrhea, nausea and vomiting.   Endocrine: Negative.    Genitourinary: Negative for decreased urine volume, difficulty urinating, dysuria, hematuria and urgency.   Musculoskeletal: Negative for back pain, gait problem, neck pain and neck stiffness.   Skin: Negative for color change and pallor.    Allergic/Immunologic: Positive for immunocompromised state.   Neurological: Negative for dizziness, seizures, weakness, light-headedness and headaches.   Psychiatric/Behavioral: Negative for behavioral problems, confusion, dysphoric mood, hallucinations, sleep disturbance and suicidal ideas. The patient is not nervous/anxious.      Objective:     Vital Signs (Most Recent):  Temp: 98.6 °F (37 °C) (04/04/18 0800)  Pulse: 79 (04/04/18 0800)  Resp: 17 (04/04/18 0800)  BP: 127/85 (04/04/18 0800)  SpO2: 95 % (04/04/18 0800) Vital Signs (24h Range):  Temp:  [98.4 °F (36.9 °C)-98.8 °F (37.1 °C)] 98.6 °F (37 °C)  Pulse:  [69-80] 79  Resp:  [16-18] 17  SpO2:  [95 %-99 %] 95 %  BP: (120-147)/(78-86) 127/85     Weight: 63.4 kg (139 lb 12.4 oz)  Body mass index is 22.57 kg/m².    Intake/Output - Last 3 Shifts       04/02 0700 - 04/03 0659 04/03 0700 - 04/04 0659 04/04 0700 - 04/05 0659    P.O. 1050 1160 450    IV Piggyback       TPN 1019.5 628.4 494.2    Total Intake(mL/kg) 2069.5 (33.9) 1788.4 (28.2) 944.2 (14.9)    Urine (mL/kg/hr) 1650 (1.1) 600 (0.4) 525 (1.4)    Emesis/NG output       Other       Stool 0 (0) 0 (0) 0 (0)    Blood       Total Output 1650 600 525    Net +419.5 +1188.4 +419.2           Stool Occurrence 0 x 0 x 0 x          Physical Exam   Constitutional: He is oriented to person, place, and time. He appears well-developed.   Hand and temporal muscle wasting   HENT:   Head: Normocephalic.   Eyes: Pupils are equal, round, and reactive to light. No scleral icterus.   Cardiovascular: Normal rate, regular rhythm, normal heart sounds and intact distal pulses.    Pulmonary/Chest: Effort normal and breath sounds normal. No respiratory distress. He has no wheezes. He has no rhonchi. He has no rales.   Abdominal: Soft. Bowel sounds are normal. He exhibits distension. There is no tenderness.   Musculoskeletal: Normal range of motion. He exhibits edema (generalized).   Neurological: He is alert and oriented to person,  place, and time.   Skin: Skin is warm and dry.   Psychiatric: He has a normal mood and affect. His behavior is normal. Judgment and thought content normal.   Nursing note and vitals reviewed.      Laboratory:  Immunosuppressants         Stop Route Frequency     cycloSPORINE modified capsule 125 mg      -- Oral 2 times daily     cycloSPORINE modified (NEORAL) 25 MG capsule      03/09 1714       cycloSPORINE modified (NEORAL) 100 MG capsule      03/09 1714       cycloSPORINE modified (NEORAL) 100 MG capsule      03/09 0514       cycloSPORINE modified (NEORAL) 25 MG capsule      03/09 0514          CBC:   Recent Labs  Lab 04/04/18  0500   WBC 2.63*   RBC 2.51*   HGB 8.3*   HCT 25.8*   PLT 79*   *   MCH 33.1*   MCHC 32.2     CMP:   Recent Labs  Lab 04/04/18  0500      CALCIUM 9.4   ALBUMIN 2.9*   PROT 6.2      K 4.1   CO2 26      BUN 95*   CREATININE 3.9*   ALKPHOS 117   ALT 6*   AST 16   BILITOT 1.0     Labs within the past 24 hours have been reviewed.    Diagnostic Results:  pertinent imaging reviewed

## 2018-04-04 NOTE — SUBJECTIVE & OBJECTIVE
Interval History:   HD treatment held yesterday due to slight decrease in SCr.  This morning, SCr up to 3.9 without other electrolyte abnormalities.  UOP recorded yesterday at 600 ml/24h.  No complaints voiced this morning, oxygenating well on RA.    Review of patient's allergies indicates:   Allergen Reactions    Bactrim [sulfamethoxazole-trimethoprim] Other (See Comments)     Mookie Trell Syndrome     Current Facility-Administered Medications   Medication Frequency    0.9%  NaCl infusion PRN    0.9%  NaCl infusion Once    acetaminophen tablet 650 mg Q6H PRN    albuterol-ipratropium 2.5mg-0.5mg/3mL nebulizer solution 3 mL Q4H PRN    bisacodyl EC tablet 10 mg Daily    bisacodyl suppository 10 mg Daily PRN    butalbital-acetaminophen-caffeine -40 mg per tablet 1 tablet Q4H PRN    cycloSPORINE modified capsule 125 mg BID    dextrose 50% injection 12.5 g PRN    dextrose 50% injection 25 g PRN    diphenhydrAMINE-zinc acetate 1-0.1% cream TID PRN    docusate sodium capsule 100 mg BID    dronabinol capsule 2.5 mg QHS    epoetin maurisio injection 12,600 Units Every Mon, Wed, Fri    ergocalciferol capsule 50,000 Units Q7 Days    fat emulsion 20% infusion 250 mL Once    glucagon (human recombinant) injection 1 mg PRN    glucose chewable tablet 16 g PRN    glucose chewable tablet 24 g PRN    heparin (porcine) injection 1,000 Units PRN    heparin (porcine) injection 5,000 Units Q8H    levetiracetam oral soln Soln 500 mg BID    levothyroxine tablet 75 mcg Before breakfast    metoclopramide HCl 5 mg/5 mL solution 10 mg QID (AC & HS)    naloxone 0.4 mg/mL injection 0.2 mg PRN    omeprazole capsule 40 mg QAM    ondansetron disintegrating tablet 8 mg Q6H PRN    ondansetron injection 4 mg Q6H PRN    oxyCODONE immediate release tablet 10 mg Q4H PRN    oxyCODONE immediate release tablet 5 mg Nightly PRN    predniSONE tablet 10 mg Daily    prochlorperazine injection Soln 10 mg Q6H PRN     prochlorperazine tablet 5 mg TID PRN    sevelamer carbonate tablet 800 mg TID WM    simethicone chewable tablet 80 mg TID PRN    sodium chloride 0.9% flush 3 mL PRN    sodium chloride 0.9% flush 3 mL PRN    TPN ADULT CENTRAL LINE CUSTOM Continuous    ursodiol oral suspension (conc: 60 mg/mL) 300 mg BID       Objective:     Vital Signs (Most Recent):  Temp: 98.5 °F (36.9 °C) (04/04/18 0418)  Pulse: 76 (04/04/18 0418)  Resp: 18 (04/04/18 0418)  BP: 120/78 (04/04/18 0418)  SpO2: 96 % (04/04/18 0418)  O2 Device (Oxygen Therapy): room air (04/03/18 2011) Vital Signs (24h Range):  Temp:  [98.2 °F (36.8 °C)-98.8 °F (37.1 °C)] 98.5 °F (36.9 °C)  Pulse:  [69-80] 76  Resp:  [16-18] 18  SpO2:  [95 %-99 %] 96 %  BP: (120-147)/(78-86) 120/78     Weight: 63.4 kg (139 lb 12.4 oz) (04/04/18 0500)  Body mass index is 22.57 kg/m².  Body surface area is 1.72 meters squared.    I/O last 3 completed shifts:  In: 3010.9 [P.O.:1610]  Out: 1350 [Urine:1350]    Physical Exam   Constitutional: He is oriented to person, place, and time. He appears well-developed. No distress.   HENT:   Head: Normocephalic and atraumatic.   Right Ear: External ear normal.   Left Ear: External ear normal.   Mouth/Throat: No oropharyngeal exudate.   Eyes: Conjunctivae and EOM are normal. Right eye exhibits no discharge. Left eye exhibits no discharge. No scleral icterus.   Neck: Normal range of motion. Neck supple.   Cardiovascular: Normal rate and regular rhythm.  Exam reveals no gallop.    No murmur heard.  Pulmonary/Chest: Effort normal and breath sounds normal. No respiratory distress. He has no wheezes. He has no rales.   Abdominal: Soft. There is no tenderness. There is no rebound.   Musculoskeletal: Normal range of motion. He exhibits no edema or deformity.   Lymphadenopathy:     He has no cervical adenopathy.   Neurological: He is alert and oriented to person, place, and time.   Skin: Skin is warm and dry. No rash noted. He is not diaphoretic. No  erythema.       Significant Labs:  CBC:   Recent Labs  Lab 04/04/18  0500   WBC 2.63*   RBC 2.51*   HGB 8.3*   HCT 25.8*   PLT 79*   *   MCH 33.1*   MCHC 32.2     CMP:   Recent Labs  Lab 04/04/18  0500      CALCIUM 9.4   ALBUMIN 2.9*   PROT 6.2      K 4.1   CO2 26      BUN 95*   CREATININE 3.9*   ALKPHOS 117   ALT 6*   AST 16   BILITOT 1.0

## 2018-04-04 NOTE — PLAN OF CARE
Pt AAOx4, vital signs stable.  Pt ate chinese food with mom for dinner.  Pt resting throughout night.  Bed locked, lowered, bedrails up x2, and call bell in reach.  See flowsheet for assessment findings.  Will continue to monitor.

## 2018-04-04 NOTE — PROGRESS NOTES
[facility]  Liver Transplant  Progress Note    Patient Name: Jhonny Diana  MRN: [id number]  Admission Date: 2018  Hospital Length of Stay: 83 days  Code Status: Full Code  Primary Care Provider: Primary Doctor No  Post-Operative Day: 167    ORGAN:   LIVER  Disease Etiology: Acute Alcoholic Hepatitis  Donor Type:    - Brain Death  CDC High Risk:   No  Donor CMV Status:   Donor CMV Status: Positive  Donor HBcAB:   Negative  Donor HCV Status:   Negative  Whole or Partial: Whole Liver  Biliary Anastomosis: End to End  Arterial Anatomy: Standard  Subjective:     History of Present Illness:  Jhonny Diana is a 26 y/o male with past medical history of alcoholic cirrhosis.  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 Found to have peritonitis in November (WBC 5000, 75% PNM) neg for bile leak. He was treated initially with vanc/cefepime. Repeat cell counts  with some improvement (WBC 1400, 45% PNM). He has undergone multiple paracenteses as well as abscess drainage of perihepatic fluid collections and treated with antimicrobial therapy but no positive cultures. Of note, biliary stricture also identified and ERCP performed on 2017 with sphincterotomy and biliary stent placed. Liver tests still have not normalized despite intervention, bilirubin and AP remain elevated. Other pertinent PMH current wound vac in place 2/2 wound infection, malnutrition requiring TPN for short course and ongoing hypoalbuminemia, and seizure activity while on prograf and has since been switched to cyclosporine without reoccurrence.  He presented to the ER for fever, abdominal pain, and N/V. He reports fever (103) for 1  day prior. Overnight, he developed N/V, reports small amount of green emesis with new left sided pain. He also endorses worsening SOB with exertion. He was scheduled as an outpatient for follow up paracentesis and IR drainage of fluid collection. CXR in ER shows large pleural effusion with subsegmental atelectasis. Infectious work up initiated in ER. His ANC is 900. Broad spectrum antibiotics initiated in ED. At admit, he denied chest pain, palpitations, diarrhea, constipation, back pain, or any sick contacts.    Hospital Course:  Unclear cause of ongoing fevers. Multiple infectious, diagnostic testing completed. Taken to OR for ex lap 2/6, loculated fluid collections found, cultures NGTD. Multiple blood cultures with NGTD. Thoracentesis 1/11, 1/18, 1/31, and 2/16, cultures NGTD. Paracentesis 1/11, 1/19, and 2/5, cultures NGTD. negative fungal markers/quant gold intermediate. Negative TTE.  IR drainage 1/11, cultures NGTD. Continued with intermittent fevers despite broad spectrum antibiotics that were initially started 1/11, then broadened antibiotics 2/22 from vanc/zosyn to ertapenem. Multiple CT scan of abd obtained that were unremarkable. Tagged WBCs scan 2/27 without source of infection found. He had several episodes of neutropenia requiring neupogen, last given 2/28. 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 Monitoring weekly CMV PCR. Chimerism studies obtained 3/1 to assess for GVHD, which was negative, EBV PCR negative, adenovirus negative. In addition to fevers, he has consistently c/o abdominal pain and N/V. Tbili has remain elevated post-op requiring multiple ERCPs. ERCPs performed this admission on 1/30, 2/19, and 3/9. ERCPs with sludge and stones with stents placed. Last ERCP 3/9 with metal stent placed into CBD and stones removed. His ertapenem was d/cd 3/7 due to AMS. Following ERCP 3/9, he was started on cefepime and flagyl  for possible cholangitis. He has remained afebrile since 3/10. He completed cefepime/flagyl after 3 weeks on 3/19 & 3/20. Due to elevated enzymes, he had a liver biopsy 1/23 consistent with cholestatic hepatitis (no cultures obtained during biopsy). GI was consulted for persistent N/V and malnutrition. EGD performed 1/25 and 3/6, EGD 3/6 with normal esophagus congested, erythematous and nodular mucosa in the stomach, one duodenal ulcer with a clean ulcer base (Emir Class III), biopsies taken, negative for CMV and GVHD. Due to N/V and abdominal pain, multiple attempts were made to place SHANE tube with tube feeding, but patient did not tolerate TF. Therefore, he was transitioned to TPN 1/16 then again on 2/24. He also has chronic constipation. Small bowel follow through study with normal findings. Following EGD 1/25, he was started on a PPI, miralax, and Metamucil, but he did not tolerate meds. For appetite stimulation he was trialed on marinol. He also did well with reglan before meals and at bedtime. Early post-op liver transplant surgery, he was switched to cyclosporine for seizures. During this admission, he was transitioned back to prograf 2/24 with keppra on board. Starting 3/1 he developed stuttering speech, disorientation. CT head 3/1 and MRI head 3/6 unremarkable. EEG 3/8 with no seizure activity. He started on rapamune and stress dose steroids 3/10, then ultimately returned to cyclosporine 3/13 and rapamune d/cd same day. Since transitioning back to cyclosporine, his mental status has remained stable. During admission, patient was evaluated by [facility] for future kidney transplant, and he was deemed to be a suitable candidate once medically stable and cleared. He dialyzes on a M-W-F schedule. He was anuric on admission, but starting producing urine around 3/16.  Now producing >2-3L per day.       Interval History: No acute events overnight. Slowly trying to improve caloric intake, goal 300-500 jalen/day. Was  able to eat full lunch and dinner yesterday. Will half TPN tonight. Continue reglan and marinol.  Patient reports stability of abdominal pain today. Cultures, NGTD. Last HD 3/29 (plan is twice weekly until starts clearing)- creatinine slightly worse today- plan for HD tomorrow. Holding Torsemide 50 mg daily for now.  He remains afebrile off antibiotics. Cont phos binder. Monitor.     Scheduled Meds:   sodium chloride 0.9%   Intravenous Once    bisacodyl  10 mg Oral Daily    cycloSPORINE modified  125 mg Oral BID    docusate sodium  100 mg Oral BID    dronabinol  2.5 mg Oral QHS    epoetin maurisio (PROCRIT) injection  12,600 Units Intravenous Every Mon, Wed, Fri    ergocalciferol  50,000 Units Oral Q7 Days    heparin (porcine)  5,000 Units Subcutaneous Q8H    levetiracetam oral soln  500 mg Oral BID    levothyroxine  75 mcg Oral Before breakfast    metoclopramide HCl  10 mg Oral QID (AC & HS)    omeprazole  40 mg Oral QAM    predniSONE  10 mg Oral Daily    sevelamer carbonate  800 mg Oral TID WM    ursodiol  300 mg Oral BID     Continuous Infusions:  PRN Meds:sodium chloride 0.9%, acetaminophen, albuterol-ipratropium 2.5mg-0.5mg/3mL, bisacodyl, butalbital-acetaminophen-caffeine -40 mg, dextrose 50%, dextrose 50%, diphenhydrAMINE-zinc acetate 1-0.1%, glucagon (human recombinant), glucose, glucose, heparin (porcine), naloxone, ondansetron, ondansetron, oxyCODONE, oxyCODONE, prochlorperazine, prochlorperazine, simethicone, sodium chloride 0.9%, sodium chloride 0.9%    Review of Systems   Constitutional: Positive for activity change, appetite change and fatigue. Negative for chills and fever.   HENT: Negative for congestion and facial swelling.    Eyes: Negative for pain, discharge and visual disturbance.   Respiratory: Negative.  Negative for cough, chest tightness, shortness of breath and wheezing.    Cardiovascular: Negative.  Negative for chest pain, palpitations and leg swelling.    Gastrointestinal: Positive for abdominal distention and abdominal pain. Negative for constipation, diarrhea, nausea and vomiting.   Endocrine: Negative.    Genitourinary: Negative for decreased urine volume, difficulty urinating, dysuria, hematuria and urgency.   Musculoskeletal: Negative for back pain, gait problem, neck pain and neck stiffness.   Skin: Negative for color change and pallor.   Allergic/Immunologic: Positive for immunocompromised state.   Neurological: Negative for dizziness, seizures, weakness, light-headedness and headaches.   Psychiatric/Behavioral: Negative for behavioral problems, confusion, dysphoric mood, hallucinations, sleep disturbance and suicidal ideas. The patient is not nervous/anxious.      Objective:     Vital Signs (Most Recent):  Temp: 98.6 °F (37 °C) (04/04/18 0800)  Pulse: 79 (04/04/18 0800)  Resp: 17 (04/04/18 0800)  BP: 127/85 (04/04/18 0800)  SpO2: 95 % (04/04/18 0800) Vital Signs (24h Range):  Temp:  [98.4 °F (36.9 °C)-98.8 °F (37.1 °C)] 98.6 °F (37 °C)  Pulse:  [69-80] 79  Resp:  [16-18] 17  SpO2:  [95 %-99 %] 95 %  BP: (120-147)/(78-86) 127/85     Weight: 63.4 kg (139 lb 12.4 oz)  Body mass index is 22.57 kg/m².    Intake/Output - Last 3 Shifts       04/02 0700 - 04/03 0659 04/03 0700 - 04/04 0659 04/04 0700 - 04/05 0659    P.O. 1050 1160 450    IV Piggyback       TPN 1019.5 628.4 494.2    Total Intake(mL/kg) 2069.5 (33.9) 1788.4 (28.2) 944.2 (14.9)    Urine (mL/kg/hr) 1650 (1.1) 600 (0.4) 525 (1.4)    Emesis/NG output       Other       Stool 0 (0) 0 (0) 0 (0)    Blood       Total Output 1650 600 525    Net +419.5 +1188.4 +419.2           Stool Occurrence 0 x 0 x 0 x          Physical Exam   Constitutional: He is oriented to person, place, and time. He appears well-developed.   Hand and temporal muscle wasting   HENT:   Head: Normocephalic.   Eyes: Pupils are equal, round, and reactive to light. No scleral icterus.   Cardiovascular: Normal rate, regular rhythm, normal  heart sounds and intact distal pulses.    Pulmonary/Chest: Effort normal and breath sounds normal. No respiratory distress. He has no wheezes. He has no rhonchi. He has no rales.   Abdominal: Soft. Bowel sounds are normal. He exhibits distension. There is no tenderness.   Musculoskeletal: Normal range of motion. He exhibits edema (generalized).   Neurological: He is alert and oriented to person, place, and time.   Skin: Skin is warm and dry.   Psychiatric: He has a normal mood and affect. His behavior is normal. Judgment and thought content normal.   Nursing note and vitals reviewed.      Laboratory:  Immunosuppressants         Stop Route Frequency     cycloSPORINE modified capsule 125 mg      -- Oral 2 times daily     cycloSPORINE modified (NEORAL) 25 MG capsule      03/09 1714       cycloSPORINE modified (NEORAL) 100 MG capsule      03/09 1714       cycloSPORINE modified (NEORAL) 100 MG capsule      03/09 0514       cycloSPORINE modified (NEORAL) 25 MG capsule      03/09 0514          CBC:   Recent Labs  Lab 04/04/18  0500   WBC 2.63*   RBC 2.51*   HGB 8.3*   HCT 25.8*   PLT 79*   *   MCH 33.1*   MCHC 32.2     CMP:   Recent Labs  Lab 04/04/18  0500      CALCIUM 9.4   ALBUMIN 2.9*   PROT 6.2      K 4.1   CO2 26      BUN 95*   CREATININE 3.9*   ALKPHOS 117   ALT 6*   AST 16   BILITOT 1.0     Labs within the past 24 hours have been reviewed.    Diagnostic Results:  pertinent imaging reviewed    Assessment/Plan:     Biliary stricture of transplanted liver    - ERCP 12/6 with post-anastomosis stricture with stent placement.  - Tbili with increase prompting ERCP 1/30 with stones and sludge, stents placed.  - Repeat ERCP 2/19 as was having recurrent fevers with bilirubin stuck at 2.0-2.2 with overall unremarkable findings seen. Did place new larger stents.   - ERCP/EUS 3/9 with metal stent placed in bile duct, stones removed.  - Tbili remains stable.   - Continue actigall.        Nausea and  vomiting    - h/o constipation, abdominal pain, and N/V  - worsened with SHANE tube and TF --> now on TPN  - encourage bowel regimen  - EGD performed 1/25 and 3/6, gastroparesis, duodenal ulcer, and congested/erythematous gastric mucosa  - Small bowel through with normal findings  - Continue reglan and marinol scheduled, anti-emetics PRN.   - If cont with GI symptoms then may need to consider Flex sig/ C-scope to further eval.   - Abd xray with distended colon - did not want enema  - D/C Cellcept 3/31  - Abd pain improved today, if worsens will perform CT A/P        Severe protein-calorie malnutrition    - Poor PO intake since transplant requiring short course of TPN during previous hospital stay.   - Due to N/V and abdominal pain, multiple attempts were made to place SHANE tube with tube feeding, but patient did not tolerate TF  - Transitioned to TPN 1/16 then again on 2/24 due to poor tolerance of SHANE tube/TF  - prealbumin 7 on 1/15 --> prealbumin 22 on 3/19, monitoring weekly (Mondays)  - EGD performed 1/25 and 3/6, gastroparesis, duodenal ulcer, and congested/erythematous gastric mucosa  - Small bowel through with normal findings.  - Consulted GI for GJ tube placement, but was canceled due to ongoing fevers  - Hold Reglan and Marinol 3/8 for neuro symptoms --> restarted reglan and marinol 3/19, will need to dc marinol before discharge because not covered by insurance.  - set goals for caloric intake with patient, continue nocturnal TPN started 3/20 PM to encourage appetite during the day  - Trying to set small caloric goals daily, for now goal is 300-500 jalen/day. Ultimately needs to consume 700 jalen/day to start weaning TPN.    -met with dietician 4/3/18 to discuss dietary goals. Intake significantly improved.   - Continue half dose TPN for now        Constipation    - chronic  - encourage ambulation, stool softeners and laxatives, often refuses some meds 2/2 nausea, will often require suppository or brown bomb enema  to have a BM.  - Small bowel follow through with normal findings.  - Avoid narcotics -need to ween        Liver transplanted    - Post op course complicated by fevers and hyperbilirubinemia, AST/ALT stable.  - see biliary stricture of transplanted liver  - Liver biopsy 1/23 without rejection.  - Paracentesis 3/6 negative for infection, wbc 28 segs 3%.  - ERCP, EUS biopsy 3/9/18 - no rejection, mild cholestatic hepatitis seen.   - T bili normalized        Long-term use of immunosuppressant medication    - Maintenance IS with cyclosporine --> transitioned to Prograf 2/24 for continued nausea but did not tolerate from neuro standpoint.  PT IS NOT A CANDIDATE FOR TACROLIMUS.    - Transitioned back to cyclosporine 3/1  - D/c cyclosporine, started rapa & stress dose steroids 3/10 for continued confusion  - Mental status now improved, re-trial cyclosporine 3/13, d/c rapa 3/13  - Transition back to prednisone taper from stress dose steroids 3/15 --> will decrease by 5 mg q week (decrease to 5 mg 4/6).  - Restarted low dose MMF 3/23- with nausea --> D/C Cellcept 3/31 --> nausea improved --> continue to hold as has passed protocol for Cellcept        At risk for opportunistic infections    - See other cytomegaloviral disease  - Pentam given 3/27  - D/C Isavu 3/21.        Prophylactic immunotherapy    - See long term use of immunosuppression.         Delayed surgical wound healing    - Wd vac removed 1/12/18.    - Wound healing well.   - Changing dressing q Tuesday, last 3/27.        Anemia of chronic disease    - Continue Procrit  - Monitor daily labs. H/H stable, transfuse PRN.         Hyperphosphatemia    - cont renvela. Monitor.         Vitamin D deficiency    - Continue ergo 50K weekly.         Other cytomegaloviral diseases    - detected at 370 on CMV PCR 2/1.  - case discussed with ID and will hold off on treatment at this time given low WBC.   - undetected CMV PCR 2/8, 2/15, 2/22, 3/8   - continue Valcyte 200 mg every  Mon, Wed, Fri.  - Per ID, valcyte dose decreased to ppx dose on 2/18--Valcyte d/c'd 2/25  - Repeat scope 3/6 to assess persistent N/V and possible CMV causing these symptoms. Biopsies thus far normal, cmv stains negative.  - continue to monitor weekly CMV PCR's, last PCR neg from 3/15, recent level from 3/22 undetected.  - CMV 3/29 undetected.        Seizure    - 3/1, patient with stuttering speech, trouble recalling specific words when speaking. Does have hx of seizure (on keppra) previously while on prograf. Switched to prograf over the weekend from cyclosporine. Neuro exam unremarkable.   - CT head without contrast obtained, unremarkable.   - In light of these symptoms, prograf d/c, restart cyclosporine 3/1.  - remains w/o seizure. Cont to have stutter/garbled speech/difficulty finding words.  Worse post EGD as sedation was given.  - MRI 3/6 unremarkable.  - EEG completed 3/8 - mild, generalized, non-specific cerebral dysfunction, no epileptiform activity.  - monitor closely. VSS.        Acute renal failure with tubular necrosis    - HD resumed on previous admission. Anuric on admission and dialyzes M-W-F.  - Nephrology following.    - Goal is for kidney txp in future- currently not a candidate due to health status.  - urine output has improved since 3/16 --> 2.6 L 3/27!  - HD held 3/21 & 3/23  - Nephrology started torsemide 50 mg daily 3/24- continue for now for volume management  - BUN elevated though on TPN, HD 3/29/18   - Will plan HD twice weekly for now   HD held 4/3, plan for HD 4/5  - Hold torsemide for now (d/roselia on 3/31)            VTE Risk Mitigation         Ordered     heparin (porcine) injection 1,000 Units  As needed (PRN)     Route:  Intra-Catheter        03/12/18 1134     heparin (porcine) injection 5,000 Units  Every 8 hours     Route:  Subcutaneous        03/08/18 1622     Medium Risk of VTE  Once      01/11/18 0351     Place sequential compression device  Until discontinued      01/11/18 9284           The patients clinical status was discussed at multidisplinary rounds, involving transplant surgery, transplant medicine, pharmacy, nursing, nutrition, and social work    Discharge Planning:  Monitor kidney function --> may need outpt HD  Monitor HH needs vs rehab for deconditioned status      Fransisca Thomas PA-C  Liver Transplant  [facility]

## 2018-04-04 NOTE — ASSESSMENT & PLAN NOTE
- h/o constipation, abdominal pain, and N/V  - worsened with SHANE tube and TF --> now on TPN  - encourage bowel regimen  - EGD performed 1/25 and 3/6, gastroparesis, duodenal ulcer, and congested/erythematous gastric mucosa  - Small bowel through with normal findings  - Continue reglan and marinol scheduled, anti-emetics PRN.   - If cont with GI symptoms then may need to consider Flex sig/ C-scope to further eval.   - Abd xray with distended colon - did not want enema  - D/C Cellcept 3/31  - Abd pain improved today, if worsens will perform CT A/P

## 2018-04-04 NOTE — ASSESSMENT & PLAN NOTE
LAURA dialysisi dependant since 1016/2017  HD on TTS [facility] under the care of Dr. Peterson  On HD for: 2.5 mo since 10/16/2017  Duration of outpatient dialysis session - 3.5 hrs  EDW - TBD  Residual Renal Function - yes     HD held yesterday due to slight decrease in SCr.  Unfortunately this morning, SCr continues to rise with an increase to 3.9 from 3.6.  No signs of uremia on examination    Plan:  Will plan for HD treatment tomorrow for metabolic clearance unless there is a significant improvement in his SCr.      Anemia of CKD  -continue VIDA.    BMM  Phos elevated at 7.4.    Continue sevelamer with meals.  Continue Ergo

## 2018-04-05 LAB
ALBUMIN SERPL BCP-MCNC: 2.8 G/DL
ALP SERPL-CCNC: 113 U/L
ALT SERPL W/O P-5'-P-CCNC: 7 U/L
ANION GAP SERPL CALC-SCNC: 13 MMOL/L
AST SERPL-CCNC: 16 U/L
BASOPHILS # BLD AUTO: 0.01 K/UL
BASOPHILS NFR BLD: 0.4 %
BILIRUB SERPL-MCNC: 1.1 MG/DL
BUN SERPL-MCNC: 99 MG/DL
CALCIUM SERPL-MCNC: 9.4 MG/DL
CHLORIDE SERPL-SCNC: 102 MMOL/L
CO2 SERPL-SCNC: 25 MMOL/L
CREAT SERPL-MCNC: 3.7 MG/DL
CYCLOSPORINE BLD LC/MS/MS-MCNC: 164 NG/ML
DIFFERENTIAL METHOD: ABNORMAL
EOSINOPHIL # BLD AUTO: 0.1 K/UL
EOSINOPHIL NFR BLD: 2.9 %
ERYTHROCYTE [DISTWIDTH] IN BLOOD BY AUTOMATED COUNT: 15.3 %
EST. GFR  (AFRICAN AMERICAN): 24.2 ML/MIN/1.73 M^2
EST. GFR  (NON AFRICAN AMERICAN): 21 ML/MIN/1.73 M^2
GLUCOSE SERPL-MCNC: 96 MG/DL
HCT VFR BLD AUTO: 25.6 %
HGB BLD-MCNC: 8.2 G/DL
IMM GRANULOCYTES # BLD AUTO: 0.01 K/UL
IMM GRANULOCYTES NFR BLD AUTO: 0.4 %
LYMPHOCYTES # BLD AUTO: 0.9 K/UL
LYMPHOCYTES NFR BLD: 30.6 %
MAGNESIUM SERPL-MCNC: 2.1 MG/DL
MCH RBC QN AUTO: 32.9 PG
MCHC RBC AUTO-ENTMCNC: 32 G/DL
MCV RBC AUTO: 103 FL
MONOCYTES # BLD AUTO: 0.4 K/UL
MONOCYTES NFR BLD: 12.9 %
NEUTROPHILS # BLD AUTO: 1.5 K/UL
NEUTROPHILS NFR BLD: 52.8 %
NRBC BLD-RTO: 0 /100 WBC
PHOSPHATE SERPL-MCNC: 6.8 MG/DL
PLATELET # BLD AUTO: 66 K/UL
PMV BLD AUTO: 10.6 FL
POTASSIUM SERPL-SCNC: 4.8 MMOL/L
PROT SERPL-MCNC: 6 G/DL
RBC # BLD AUTO: 2.49 M/UL
SODIUM SERPL-SCNC: 140 MMOL/L
WBC # BLD AUTO: 2.78 K/UL

## 2018-04-05 PROCEDURE — 20600001 HC STEP DOWN PRIVATE ROOM

## 2018-04-05 PROCEDURE — 84100 ASSAY OF PHOSPHORUS: CPT

## 2018-04-05 PROCEDURE — 63600175 PHARM REV CODE 636 W HCPCS: Performed by: PHYSICIAN ASSISTANT

## 2018-04-05 PROCEDURE — 25000003 PHARM REV CODE 250: Performed by: PHYSICIAN ASSISTANT

## 2018-04-05 PROCEDURE — 80053 COMPREHEN METABOLIC PANEL: CPT

## 2018-04-05 PROCEDURE — 63600175 PHARM REV CODE 636 W HCPCS: Performed by: NURSE PRACTITIONER

## 2018-04-05 PROCEDURE — 99233 SBSQ HOSP IP/OBS HIGH 50: CPT | Mod: ,,, | Performed by: NURSE PRACTITIONER

## 2018-04-05 PROCEDURE — 25000003 PHARM REV CODE 250: Performed by: NURSE PRACTITIONER

## 2018-04-05 PROCEDURE — 25000003 PHARM REV CODE 250

## 2018-04-05 PROCEDURE — 97116 GAIT TRAINING THERAPY: CPT

## 2018-04-05 PROCEDURE — 99233 SBSQ HOSP IP/OBS HIGH 50: CPT | Mod: ,,, | Performed by: PHYSICIAN ASSISTANT

## 2018-04-05 PROCEDURE — 85025 COMPLETE CBC W/AUTO DIFF WBC: CPT

## 2018-04-05 PROCEDURE — 83735 ASSAY OF MAGNESIUM: CPT

## 2018-04-05 PROCEDURE — 80158 DRUG ASSAY CYCLOSPORINE: CPT

## 2018-04-05 PROCEDURE — A4217 STERILE WATER/SALINE, 500 ML: HCPCS | Performed by: PHYSICIAN ASSISTANT

## 2018-04-05 RX ORDER — SODIUM CHLORIDE 9 MG/ML
INJECTION, SOLUTION INTRAVENOUS ONCE
Status: DISCONTINUED | OUTPATIENT
Start: 2018-04-05 | End: 2018-04-10

## 2018-04-05 RX ADMIN — METOCLOPRAMIDE HYDROCHLORIDE 10 MG: 5 SOLUTION ORAL at 05:04

## 2018-04-05 RX ADMIN — PREDNISONE 10 MG: 10 TABLET ORAL at 05:04

## 2018-04-05 RX ADMIN — OXYCODONE HYDROCHLORIDE 10 MG: 5 TABLET ORAL at 02:04

## 2018-04-05 RX ADMIN — OXYCODONE HYDROCHLORIDE 10 MG: 5 TABLET ORAL at 11:04

## 2018-04-05 RX ADMIN — METOCLOPRAMIDE HYDROCHLORIDE 10 MG: 5 SOLUTION ORAL at 11:04

## 2018-04-05 RX ADMIN — URSODIOL 300 MG: 300 CAPSULE ORAL at 05:04

## 2018-04-05 RX ADMIN — METOCLOPRAMIDE HYDROCHLORIDE 10 MG: 5 SOLUTION ORAL at 10:04

## 2018-04-05 RX ADMIN — SEVELAMER CARBONATE 800 MG: 800 TABLET, FILM COATED ORAL at 05:04

## 2018-04-05 RX ADMIN — CYCLOSPORINE 125 MG: 100 CAPSULE, LIQUID FILLED ORAL at 05:04

## 2018-04-05 RX ADMIN — BISACODYL 10 MG: 5 TABLET, COATED ORAL at 11:04

## 2018-04-05 RX ADMIN — OXYCODONE HYDROCHLORIDE 10 MG: 5 TABLET ORAL at 07:04

## 2018-04-05 RX ADMIN — LEVOTHYROXINE SODIUM 75 MCG: 75 TABLET ORAL at 05:04

## 2018-04-05 RX ADMIN — OXYCODONE HYDROCHLORIDE 10 MG: 5 TABLET ORAL at 09:04

## 2018-04-05 RX ADMIN — SEVELAMER CARBONATE 800 MG: 800 TABLET, FILM COATED ORAL at 11:04

## 2018-04-05 RX ADMIN — DOCUSATE SODIUM 100 MG: 100 CAPSULE, LIQUID FILLED ORAL at 10:04

## 2018-04-05 RX ADMIN — ERGOCALCIFEROL 50000 UNITS: 1.25 CAPSULE ORAL at 11:04

## 2018-04-05 RX ADMIN — DOCUSATE SODIUM 100 MG: 100 CAPSULE, LIQUID FILLED ORAL at 11:04

## 2018-04-05 RX ADMIN — URSODIOL 300 MG: 300 CAPSULE ORAL at 10:04

## 2018-04-05 RX ADMIN — MAGNESIUM SULFATE HEPTAHYDRATE: 500 INJECTION, SOLUTION INTRAMUSCULAR; INTRAVENOUS at 10:04

## 2018-04-05 RX ADMIN — DRONABINOL 2.5 MG: 2.5 CAPSULE ORAL at 10:04

## 2018-04-05 RX ADMIN — PROCHLORPERAZINE EDISYLATE 10 MG: 5 INJECTION INTRAMUSCULAR; INTRAVENOUS at 11:04

## 2018-04-05 RX ADMIN — LEVETIRACETAM 500 MG: 100 SOLUTION ORAL at 10:04

## 2018-04-05 RX ADMIN — LEVETIRACETAM 500 MG: 100 SOLUTION ORAL at 11:04

## 2018-04-05 RX ADMIN — OMEPRAZOLE 40 MG: 40 CAPSULE, DELAYED RELEASE ORAL at 05:04

## 2018-04-05 NOTE — PROGRESS NOTES
EZEKIEL spoke with Chano from [facility] ([phone]) regarding pt's daily progress. Chano notified that pt eating and moving around, could be discharged early next week as a possibility. Chano expressed understanding and EZEKIEL will continue to follow up early next week. EZEKIEL remains available.

## 2018-04-05 NOTE — PROGRESS NOTES
[facility]  Nephrology  Progress Note    Patient Name: Jhonny Diana  MRN: [id number]  Admission Date: 1/11/2018  Hospital Length of Stay: 84 days  Attending Provider: Solis Chapa MD   Primary Care Physician: Primary Doctor No  Principal Problem:Fever    Subjective:     HPI: Jhonny Diana is a 26 y/o  male with PMHx relevant for ETOH cirrhosis.  S/p DDLT 10/19/2017; c/b seizures (swtiched off prograf to cyclo), ATN dialysis dependant in iHD MWF, last HD on 1/10, anuric, superficial wound infection s/p wound vac to chevron incision, and multiple admissions for fevers finally dx with peritonitis in November (WBC 5000, 75% PNM) neg for bile leak. HE gets recurrent paracentesis and has peritoneal drain. Other pertinent PMH current wound vac in place 2/2 wound infection, malnutrition requiring TPN for short course and ongoing hypoalbuminemia, and seizure activity while on prograf and has since been switched to cyclosporine without reoccurrence.  He is admitted to LTx service secondary to fever, abdominal pain, and N/V. He reports fever (103) for 1 day prior. Overnight, he developed N/V, reports small amount of green emesis with new left sided pain. He also endorses worsening SOB with exertion. He was scheduled as an outpatient for follow up paracentesis and IR drainage of fluid collection. CXR in ER shows large pleural effusion with subsegmental atelectasis. His ANC is 900. Broad spectrum antibiotics initiated in ED. Nephrology consulted for LAURA dialysis dependant co management. He dialyses at [facility] under the care of Dr. Peterson via [facility] rowe.      Interval History:   Had a decrease in SCr this morning from 3.9 down to 3.7.  BUN remains elevated, but he is on TPN as well as prednisone, no signs of uremia on examination.  UOP recorded at 1.4L yesterday, net positive 1L/24h.  No complaints voiced on examination.    Review of patient's allergies indicates:   Allergen Reactions    Bactrim  [sulfamethoxazole-trimethoprim] Other (See Comments)     Mookie Trell Syndrome     Current Facility-Administered Medications   Medication Frequency    0.9%  NaCl infusion PRN    0.9%  NaCl infusion Once    acetaminophen tablet 650 mg Q6H PRN    albuterol-ipratropium 2.5mg-0.5mg/3mL nebulizer solution 3 mL Q4H PRN    bisacodyl EC tablet 10 mg Daily    bisacodyl suppository 10 mg Daily PRN    butalbital-acetaminophen-caffeine -40 mg per tablet 1 tablet Q4H PRN    cycloSPORINE modified capsule 125 mg BID    dextrose 50% injection 12.5 g PRN    dextrose 50% injection 25 g PRN    diphenhydrAMINE-zinc acetate 1-0.1% cream TID PRN    docusate sodium capsule 100 mg BID    dronabinol capsule 2.5 mg QHS    epoetin maurisio injection 12,600 Units Every Mon, Wed, Fri    ergocalciferol capsule 50,000 Units Q7 Days    glucagon (human recombinant) injection 1 mg PRN    glucose chewable tablet 16 g PRN    glucose chewable tablet 24 g PRN    heparin (porcine) injection 1,000 Units PRN    heparin (porcine) injection 5,000 Units Q8H    levetiracetam oral soln Soln 500 mg BID    levothyroxine tablet 75 mcg Before breakfast    metoclopramide HCl 5 mg/5 mL solution 10 mg QID (AC & HS)    naloxone 0.4 mg/mL injection 0.2 mg PRN    omeprazole capsule 40 mg QAM    ondansetron disintegrating tablet 8 mg Q6H PRN    ondansetron injection 4 mg Q6H PRN    oxyCODONE immediate release tablet 10 mg Q4H PRN    oxyCODONE immediate release tablet 5 mg Nightly PRN    predniSONE tablet 10 mg Daily    prochlorperazine injection Soln 10 mg Q6H PRN    prochlorperazine tablet 5 mg TID PRN    sevelamer carbonate tablet 800 mg TID WM    simethicone chewable tablet 80 mg TID PRN    sodium chloride 0.9% flush 3 mL PRN    sodium chloride 0.9% flush 3 mL PRN    ursodiol oral suspension (conc: 60 mg/mL) 300 mg BID       Objective:     Vital Signs (Most Recent):  Temp: 97.7 °F (36.5 °C) (04/05/18 0348)  Pulse: 78 (04/05/18  0917)  Resp: 16 (04/05/18 0917)  BP: 130/83 (04/05/18 0917)  SpO2: 97 % (04/05/18 0917)  O2 Device (Oxygen Therapy): room air (04/04/18 1938) Vital Signs (24h Range):  Temp:  [97.7 °F (36.5 °C)-98.6 °F (37 °C)] 97.7 °F (36.5 °C)  Pulse:  [67-78] 78  Resp:  [16-20] 16  SpO2:  [95 %-98 %] 97 %  BP: (122-137)/(72-85) 130/83     Weight: 64.4 kg (141 lb 15.6 oz) (04/05/18 0500)  Body mass index is 22.93 kg/m².  Body surface area is 1.73 meters squared.    I/O last 3 completed shifts:  In: 3264.5 [P.O.:1880]  Out: 1325 [Urine:1325]    Physical Exam   Constitutional: He is oriented to person, place, and time. He appears well-developed. No distress.   HENT:   Head: Normocephalic and atraumatic.   Right Ear: External ear normal.   Left Ear: External ear normal.   Mouth/Throat: No oropharyngeal exudate.   Eyes: Conjunctivae and EOM are normal. Right eye exhibits no discharge. Left eye exhibits no discharge. No scleral icterus.   Neck: Normal range of motion. Neck supple.   Cardiovascular: Normal rate and regular rhythm.  Exam reveals no gallop.    No murmur heard.  Pulmonary/Chest: Effort normal and breath sounds normal. No respiratory distress. He has no wheezes. He has no rales.   Abdominal: Soft. There is no tenderness. There is no rebound.   Musculoskeletal: Normal range of motion. He exhibits no edema or deformity.   Lymphadenopathy:     He has no cervical adenopathy.   Neurological: He is alert and oriented to person, place, and time.   Skin: Skin is warm and dry. No rash noted. He is not diaphoretic. No erythema.       Significant Labs:  CBC:   Recent Labs  Lab 04/05/18  0500   WBC 2.78*   RBC 2.49*   HGB 8.2*   HCT 25.6*   PLT 66*   *   MCH 32.9*   MCHC 32.0     CMP:   Recent Labs  Lab 04/05/18  0500   GLU 96   CALCIUM 9.4   ALBUMIN 2.8*   PROT 6.0      K 4.8   CO2 25      BUN 99*   CREATININE 3.7*   ALKPHOS 113   ALT 7*   AST 16   BILITOT 1.1*            Assessment/Plan:     Acute renal failure  with tubular necrosis    LAURA dialysisi dependant since 1016/2017  HD on [facility] under the care of Dr. Peterson  On HD for: 2.5 mo since 10/16/2017  Duration of outpatient dialysis session - 3.5 hrs  EDW - TBD  Residual Renal Function - yes     SCr improved today from 3.9 down to 3.7, net positive 1L.  1.4L UOP.  No signs of uremia on examination.    Plan:  Will hold HD treatment today.  Discussed with primary team.  Will re-evaluate in AM with labs.      Anemia of CKD  -continue VIDA.    BMM  Phos improved to 6.8 this morning, remains elevated.    Recommend to increase Sevelamer to 1600 mg PO TID with meals.  Continue Ergo               Derrick Love NP  Nephrology  [facility]  Pager:  [phone]

## 2018-04-05 NOTE — PLAN OF CARE
Problem: Patient Care Overview  Goal: Plan of Care Review  Pt AAO and in good spirits- ambulatory but using cane to walk. Mother wheels him down to the cafeteria for meals. NO HD today as he made urine and creat dec- will assess again tomorrow.  Received pain meds x2 today= oxy po 10mg.  Pt independent and moved in the chair and bed and shifts self from side to side - complaints feet hurting and recommended elevating feet.. Pt receiving dulcolax and colace - had one BM today and ate chicken wings for lunch.  VSS.  Is very determined to be discharged soon- mom at bedside and very attentive

## 2018-04-05 NOTE — SUBJECTIVE & OBJECTIVE
Scheduled Meds:   sodium chloride 0.9%   Intravenous Once    sodium chloride 0.9%   Intravenous Once    bisacodyl  10 mg Oral Daily    cycloSPORINE modified  125 mg Oral BID    docusate sodium  100 mg Oral BID    dronabinol  2.5 mg Oral QHS    epoetin maurisio (PROCRIT) injection  12,600 Units Intravenous Every Mon, Wed, Fri    ergocalciferol  50,000 Units Oral Q7 Days    heparin (porcine)  5,000 Units Subcutaneous Q8H    levetiracetam oral soln  500 mg Oral BID    levothyroxine  75 mcg Oral Before breakfast    metoclopramide HCl  10 mg Oral QID (AC & HS)    omeprazole  40 mg Oral QAM    predniSONE  10 mg Oral Daily    sevelamer carbonate  800 mg Oral TID WM    ursodiol  300 mg Oral BID     Continuous Infusions:   TPN ADULT CENTRAL LINE CUSTOM       PRN Meds:sodium chloride 0.9%, acetaminophen, albuterol-ipratropium 2.5mg-0.5mg/3mL, bisacodyl, butalbital-acetaminophen-caffeine -40 mg, dextrose 50%, dextrose 50%, diphenhydrAMINE-zinc acetate 1-0.1%, glucagon (human recombinant), glucose, glucose, heparin (porcine), naloxone, ondansetron, ondansetron, oxyCODONE, oxyCODONE, prochlorperazine, prochlorperazine, simethicone, sodium chloride 0.9%, sodium chloride 0.9%    Review of Systems   Constitutional: Positive for activity change, appetite change and fatigue. Negative for chills and fever.   HENT: Negative for congestion and facial swelling.    Eyes: Negative for pain, discharge and visual disturbance.   Respiratory: Negative.  Negative for cough, chest tightness, shortness of breath and wheezing.    Cardiovascular: Negative.  Negative for chest pain, palpitations and leg swelling.   Gastrointestinal: Positive for abdominal distention and abdominal pain. Negative for constipation, diarrhea, nausea and vomiting.   Endocrine: Negative.    Genitourinary: Negative for decreased urine volume, difficulty urinating, dysuria, hematuria and urgency.   Musculoskeletal: Negative for back pain, gait problem,  neck pain and neck stiffness.   Skin: Negative for color change and pallor.   Allergic/Immunologic: Positive for immunocompromised state.   Neurological: Negative for dizziness, seizures, weakness, light-headedness and headaches.   Psychiatric/Behavioral: Negative for behavioral problems, confusion, dysphoric mood, hallucinations, sleep disturbance and suicidal ideas. The patient is not nervous/anxious.      Objective:     Vital Signs (Most Recent):  Temp: 97.7 °F (36.5 °C) (04/05/18 0348)  Pulse: 78 (04/05/18 0917)  Resp: 16 (04/05/18 0917)  BP: 130/83 (04/05/18 0917)  SpO2: 97 % (04/05/18 0917) Vital Signs (24h Range):  Temp:  [97.7 °F (36.5 °C)-98.6 °F (37 °C)] 97.7 °F (36.5 °C)  Pulse:  [67-78] 78  Resp:  [16-20] 16  SpO2:  [95 %-98 %] 97 %  BP: (122-137)/(72-85) 130/83     Weight: 64.4 kg (141 lb 15.6 oz)  Body mass index is 22.93 kg/m².    Intake/Output - Last 3 Shifts       04/03 0700 - 04/04 0659 04/04 0700 - 04/05 0659 04/05 0700 - 04/06 0659    P.O. 1160 1680     .4 756.1 122.5    Total Intake(mL/kg) 1788.4 (28.2) 2436.1 (37.8) 122.5 (1.9)    Urine (mL/kg/hr) 600 (0.4) 1325 (0.9)     Stool 0 (0) 0 (0)     Total Output 600 1325      Net +1188.4 +1111.1 +122.5           Stool Occurrence 0 x 0 x           Physical Exam   Constitutional: He is oriented to person, place, and time. He appears well-developed.   Hand and temporal muscle wasting   HENT:   Head: Normocephalic.   Eyes: Pupils are equal, round, and reactive to light. No scleral icterus.   Cardiovascular: Normal rate, regular rhythm, normal heart sounds and intact distal pulses.    Pulmonary/Chest: Effort normal and breath sounds normal. No respiratory distress. He has no wheezes. He has no rhonchi. He has no rales.   Abdominal: Soft. Bowel sounds are normal. He exhibits distension. There is no tenderness.   Musculoskeletal: Normal range of motion. He exhibits edema (generalized).   Neurological: He is alert and oriented to person, place, and  time.   Skin: Skin is warm and dry.   Psychiatric: He has a normal mood and affect. His behavior is normal. Judgment and thought content normal.   Nursing note and vitals reviewed.      Laboratory:  Immunosuppressants         Stop Route Frequency     cycloSPORINE modified capsule 125 mg      -- Oral 2 times daily     cycloSPORINE modified (NEORAL) 25 MG capsule      03/09 1714       cycloSPORINE modified (NEORAL) 100 MG capsule      03/09 1714       cycloSPORINE modified (NEORAL) 100 MG capsule      03/09 0514       cycloSPORINE modified (NEORAL) 25 MG capsule      03/09 0514          CBC:   Recent Labs  Lab 04/05/18  0500   WBC 2.78*   RBC 2.49*   HGB 8.2*   HCT 25.6*   PLT 66*   *   MCH 32.9*   MCHC 32.0     CMP:   Recent Labs  Lab 04/05/18  0500   GLU 96   CALCIUM 9.4   ALBUMIN 2.8*   PROT 6.0      K 4.8   CO2 25      BUN 99*   CREATININE 3.7*   ALKPHOS 113   ALT 7*   AST 16   BILITOT 1.1*     Coagulation: No results for input(s): PT, INR, APTT in the last 168 hours.  Labs within the past 24 hours have been reviewed.    Diagnostic Results:  pertinent imaging reviewed

## 2018-04-05 NOTE — ASSESSMENT & PLAN NOTE
- h/o constipation, abdominal pain, and N/V  - worsened with SHANE tube and TF --> now on TPN  - encourage bowel regimen  - EGD performed 1/25 and 3/6, gastroparesis, duodenal ulcer, and congested/erythematous gastric mucosa  - Small bowel through with normal findings  - Continue reglan and marinol scheduled, anti-emetics PRN.   - If cont with GI symptoms then may need to consider Flex sig/ C-scope to further eval.   - Abd xray with distended colon - did not want enema  - D/C Cellcept 3/31 (has met protocol for length of treatment)  - Abd pain improved today, if worsens will perform CT A/P

## 2018-04-05 NOTE — SUBJECTIVE & OBJECTIVE
Interval History:   Had a decrease in SCr this morning from 3.9 down to 3.7.  BUN remains elevated, but he is on TPN as well as prednisone, no signs of uremia on examination.  UOP recorded at 1.4L yesterday, net positive 1L/24h.  No complaints voiced on examination.    Review of patient's allergies indicates:   Allergen Reactions    Bactrim [sulfamethoxazole-trimethoprim] Other (See Comments)     Mookie Trell Syndrome     Current Facility-Administered Medications   Medication Frequency    0.9%  NaCl infusion PRN    0.9%  NaCl infusion Once    acetaminophen tablet 650 mg Q6H PRN    albuterol-ipratropium 2.5mg-0.5mg/3mL nebulizer solution 3 mL Q4H PRN    bisacodyl EC tablet 10 mg Daily    bisacodyl suppository 10 mg Daily PRN    butalbital-acetaminophen-caffeine -40 mg per tablet 1 tablet Q4H PRN    cycloSPORINE modified capsule 125 mg BID    dextrose 50% injection 12.5 g PRN    dextrose 50% injection 25 g PRN    diphenhydrAMINE-zinc acetate 1-0.1% cream TID PRN    docusate sodium capsule 100 mg BID    dronabinol capsule 2.5 mg QHS    epoetin maurisio injection 12,600 Units Every Mon, Wed, Fri    ergocalciferol capsule 50,000 Units Q7 Days    glucagon (human recombinant) injection 1 mg PRN    glucose chewable tablet 16 g PRN    glucose chewable tablet 24 g PRN    heparin (porcine) injection 1,000 Units PRN    heparin (porcine) injection 5,000 Units Q8H    levetiracetam oral soln Soln 500 mg BID    levothyroxine tablet 75 mcg Before breakfast    metoclopramide HCl 5 mg/5 mL solution 10 mg QID (AC & HS)    naloxone 0.4 mg/mL injection 0.2 mg PRN    omeprazole capsule 40 mg QAM    ondansetron disintegrating tablet 8 mg Q6H PRN    ondansetron injection 4 mg Q6H PRN    oxyCODONE immediate release tablet 10 mg Q4H PRN    oxyCODONE immediate release tablet 5 mg Nightly PRN    predniSONE tablet 10 mg Daily    prochlorperazine injection Soln 10 mg Q6H PRN    prochlorperazine tablet 5 mg TID  PRN    sevelamer carbonate tablet 800 mg TID WM    simethicone chewable tablet 80 mg TID PRN    sodium chloride 0.9% flush 3 mL PRN    sodium chloride 0.9% flush 3 mL PRN    ursodiol oral suspension (conc: 60 mg/mL) 300 mg BID       Objective:     Vital Signs (Most Recent):  Temp: 97.7 °F (36.5 °C) (04/05/18 0348)  Pulse: 78 (04/05/18 0917)  Resp: 16 (04/05/18 0917)  BP: 130/83 (04/05/18 0917)  SpO2: 97 % (04/05/18 0917)  O2 Device (Oxygen Therapy): room air (04/04/18 1938) Vital Signs (24h Range):  Temp:  [97.7 °F (36.5 °C)-98.6 °F (37 °C)] 97.7 °F (36.5 °C)  Pulse:  [67-78] 78  Resp:  [16-20] 16  SpO2:  [95 %-98 %] 97 %  BP: (122-137)/(72-85) 130/83     Weight: 64.4 kg (141 lb 15.6 oz) (04/05/18 0500)  Body mass index is 22.93 kg/m².  Body surface area is 1.73 meters squared.    I/O last 3 completed shifts:  In: 3264.5 [P.O.:1880]  Out: 1325 [Urine:1325]    Physical Exam   Constitutional: He is oriented to person, place, and time. He appears well-developed. No distress.   HENT:   Head: Normocephalic and atraumatic.   Right Ear: External ear normal.   Left Ear: External ear normal.   Mouth/Throat: No oropharyngeal exudate.   Eyes: Conjunctivae and EOM are normal. Right eye exhibits no discharge. Left eye exhibits no discharge. No scleral icterus.   Neck: Normal range of motion. Neck supple.   Cardiovascular: Normal rate and regular rhythm.  Exam reveals no gallop.    No murmur heard.  Pulmonary/Chest: Effort normal and breath sounds normal. No respiratory distress. He has no wheezes. He has no rales.   Abdominal: Soft. There is no tenderness. There is no rebound.   Musculoskeletal: Normal range of motion. He exhibits no edema or deformity.   Lymphadenopathy:     He has no cervical adenopathy.   Neurological: He is alert and oriented to person, place, and time.   Skin: Skin is warm and dry. No rash noted. He is not diaphoretic. No erythema.       Significant Labs:  CBC:   Recent Labs  Lab 04/05/18  0500   WBC  2.78*   RBC 2.49*   HGB 8.2*   HCT 25.6*   PLT 66*   *   MCH 32.9*   MCHC 32.0     CMP:   Recent Labs  Lab 04/05/18  0500   GLU 96   CALCIUM 9.4   ALBUMIN 2.8*   PROT 6.0      K 4.8   CO2 25      BUN 99*   CREATININE 3.7*   ALKPHOS 113   ALT 7*   AST 16   BILITOT 1.1*

## 2018-04-05 NOTE — ASSESSMENT & PLAN NOTE
LAURA dialysisi dependant since 1016/2017  HD on TTS [facility] under the care of Dr. Peterson  On HD for: 2.5 mo since 10/16/2017  Duration of outpatient dialysis session - 3.5 hrs  EDW - TBD  Residual Renal Function - yes     SCr improved today from 3.9 down to 3.7, net positive 1L.  1.4L UOP.  No signs of uremia on examination.    Plan:  Will hold HD treatment today.  Discussed with primary team.  Will re-evaluate in AM with labs.      Anemia of CKD  -continue VIDA.    BMM  Phos improved to 6.8 this morning, remains elevated.    Recommend to increase Sevelamer to 1600 mg PO TID with meals.  Continue Ergo

## 2018-04-05 NOTE — PLAN OF CARE
Pt AAOx4, vital signs stable.  PRN oxycodone given. TPN going at 35ml/hr.  Pt resting throughout night.  Bed locked, lowered, bedrails up x2, and call bell in reach.  See flowsheet for assessment findings.  Will continue to monitor.

## 2018-04-05 NOTE — ASSESSMENT & PLAN NOTE
- HD resumed on previous admission. Anuric on admission and dialyzes M-W-F.  - Nephrology following.    - Goal is for kidney txp in future- currently not a candidate due to health status.  - urine output has improved since 3/16 --> 2.6 L 3/27!  - HD held 3/21 & 3/23  - Nephrology started torsemide 50 mg daily 3/24- continue for now for volume management  - BUN elevated though on TPN, HD 3/29/18   - Will plan HD twice weekly for now   HD held 4/3, plan for HD 4/6  - Hold torsemide for now (d/roselia on 3/31)

## 2018-04-05 NOTE — PLAN OF CARE
Problem: Physical Therapy Goal  Goal: Physical Therapy Goal  Goals to be met by: 18     Patient will increase functional independence with mobility by performin. Supine to sit with Modified [place]. - GOAL MET  2. Sit to stand transfer with Minimal Assistance. - GOAL MET  3. Bed to chair transfer with Contact Guard Assistance using Rolling Walker. - GOAL MET  4. Gait  x 150 feet with Contact Guard Assistance using Rolling Walker. - GOAL MET  5. Pt to perf 10x sit<>stand: 1 min 18 sec, to demonstrate improvements in B LE mm strength. - GOAL MET  6. Pt to perf 6MWT: amb 566', to demonstrate a clinically significant improvement in aerobic capacity.  - GOAL MET  7. Pt to amb 150' with SBA, RW. - GOAL MET    8. Sit to supine with Modified [place].  9. Lower extremity exercise program x 20 reps per handout, with assistance as needed.  10. Pt to perf 10x sit<>stand: 44 sec, to demonstrate improvements in B LE mm strength.  11. Pt to perf 6MWT: amb 730', to demonstrate a clinically significant improvement in aerobic capacity.          Outcome: Outcome(s) achieved Date Met: 18  D/C from acute skilled PT - pt mod I - I c functional mobility and does not require additional PT services at this time.    William Yost DPT  2018

## 2018-04-05 NOTE — PT/OT/SLP PROGRESS
"Physical Therapy Treatment and Discharge    Patient Name:  Jhonny Diana   MRN:  [id number]    Recommendations:     Discharge Recommendations:  outpatient PT   Discharge Equipment Recommendations: bath bench, cane, straight   Barriers to discharge: None    Assessment:     Jhonny Diana is a 27 y.o. male admitted with a medical diagnosis of Fever.  He presents with the following impairments/functional limitations:  weakness, impaired functional mobilty, gait instability, impaired endurance, impaired balance, impaired self care skills, impaired cardiopulmonary response to activity.  Pt tolerated treatment session c min c/o of fatigue.  Pt performed bed mobility and transfer c independence and gait c mod I.  Pt amb 524ft c SPC mod I - steady, no LOB, no SOB.  Pt currently does not require additional acute PT service at this time - relayed to pt and pt/mother agreeable.  Recommending Outpatient PT following d/c once medically cleared.    Rehab Prognosis:  good; patient would benefit from acute skilled PT services to address these deficits and reach maximum level of function.      Recent Surgery: Procedure(s) (LRB):  ERCP (N/A)  ULTRASOUND-ENDOSCOPIC-UPPER (N/A) 27 Days Post-Op    Plan:     During this hospitalization, patient to be seen 2 x/week to address the above listed problems via gait training, therapeutic activities, therapeutic exercises, neuromuscular re-education  · Plan of Care Expires:  04/05/18   Plan of Care Reviewed with: patient, mother    Subjective     Communicated with RN prior to session.  Patient found sleeping and mother present upon PT entry to room, agreeable to treatment.      Chief Complaint: fatigue  Patient comments/goals: "i'm half asleep because you woke me up during my nap." - while amb in halls  Pain/Comfort:  · Pain Rating 1: 0/10    Patients cultural, spiritual, Faith conflicts given the current situation: none    Objective:     Patient found with: central line     General Precautions: " Standard, fall   Orthopedic Precautions:N/A   Braces: N/A     Functional Mobility:  · Bed Mobility:     · Rolling Left:  independence  · Scooting: independence  · Supine to Sit: independence  · Transfers:     · Sit to Stand:  independence with no AD  · Bed to Chair: modified independence with  straight cane  using  Stand Pivot  · Gait: 524ft c SPC mod I  · Balance: dynamic standing (mod I)      AM-PAC 6 CLICK MOBILITY  Turning over in bed (including adjusting bedclothes, sheets and blankets)?: 4  Sitting down on and standing up from a chair with arms (e.g., wheelchair, bedside commode, etc.): 4  Moving from lying on back to sitting on the side of the bed?: 4  Moving to and from a bed to a chair (including a wheelchair)?: 4  Need to walk in hospital room?: 4  Climbing 3-5 steps with a railing?: 3  Total Score: 23       Therapeutic Activities and Exercises:  Educated pt and mother on d/c planning; continuing performing BLE exercises and amb in halls throughout day; progress - v/u  Attempted to perform 6MWT c pt but pt distracted in hallways multiple time talking to RN and blocked at 1 point by transport stretcher.  Pt amb 524ft c SPC mod I  Static standing 2x2min     Patient left up in chair with all lines intact, call button in reach, RN notified and mother present..    GOALS:    Physical Therapy Goals     Not on file          Multidisciplinary Problems (Resolved)        Problem: Physical Therapy Goal    Goal Priority Disciplines Outcome Goal Variances Interventions   Physical Therapy Goal   (Resolved)     PT/OT, PT Outcome(s) achieved     Description:  Goals to be met by: 18     Patient will increase functional independence with mobility by performin. Supine to sit with Modified [place]. - GOAL MET  2. Sit to stand transfer with Minimal Assistance. - GOAL MET  3. Bed to chair transfer with Contact Guard Assistance using Rolling Walker. - GOAL MET  4. Gait  x 150 feet with Contact Guard Assistance  using Rolling Walker. - GOAL MET  5. Pt to perf 10x sit<>stand: 1 min 18 sec, to demonstrate improvements in B LE mm strength. - GOAL MET  6. Pt to perf 6MWT: amb 566', to demonstrate a clinically significant improvement in aerobic capacity.  - GOAL MET  7. Pt to amb 150' with SBA, RW. - GOAL MET    8. Sit to supine with Modified [place].  9. Lower extremity exercise program x 20 reps per handout, with assistance as needed.  10. Pt to perf 10x sit<>stand: 44 sec, to demonstrate improvements in B LE mm strength.  11. Pt to perf 6MWT: amb 730', to demonstrate a clinically significant improvement in aerobic capacity.                           Time Tracking:     PT Received On: 04/05/18  PT Start Time: 1411     PT Stop Time: 1428  PT Total Time (min): 17 min     Billable Minutes: Gait Training 17 min    Treatment Type: Treatment  PT/PTA: PT     PTA Visit Number: 1     William Yost, PT  04/05/2018

## 2018-04-05 NOTE — PROGRESS NOTES
[facility]  Liver Transplant  Progress Note    Patient Name: Jhonny Diana  MRN: [id number]  Admission Date: 2018  Hospital Length of Stay: 84 days  Code Status: Full Code  Primary Care Provider: Primary Doctor No  Post-Operative Day: 168    ORGAN:   LIVER  Disease Etiology: Acute Alcoholic Hepatitis  Donor Type:    - Brain Death  CDC High Risk:   No  Donor CMV Status:   Donor CMV Status: Positive  Donor HBcAB:   Negative  Donor HCV Status:   Negative  Whole or Partial: Whole Liver  Biliary Anastomosis: End to End  Arterial Anatomy: Standard  Subjective:     History of Present Illness:  Jhonny Diana is a 26 y/o male with past medical history of alcoholic cirrhosis.  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 Found to have peritonitis in November (WBC 5000, 75% PNM) neg for bile leak. He was treated initially with vanc/cefepime. Repeat cell counts  with some improvement (WBC 1400, 45% PNM). He has undergone multiple paracenteses as well as abscess drainage of perihepatic fluid collections and treated with antimicrobial therapy but no positive cultures. Of note, biliary stricture also identified and ERCP performed on 2017 with sphincterotomy and biliary stent placed. Liver tests still have not normalized despite intervention, bilirubin and AP remain elevated. Other pertinent PMH current wound vac in place 2/2 wound infection, malnutrition requiring TPN for short course and ongoing hypoalbuminemia, and seizure activity while on prograf and has since been switched to cyclosporine without reoccurrence.  He presented to the ER for fever, abdominal pain, and N/V. He reports fever (103) for 1  day prior. Overnight, he developed N/V, reports small amount of green emesis with new left sided pain. He also endorses worsening SOB with exertion. He was scheduled as an outpatient for follow up paracentesis and IR drainage of fluid collection. CXR in ER shows large pleural effusion with subsegmental atelectasis. Infectious work up initiated in ER. His ANC is 900. Broad spectrum antibiotics initiated in ED. At admit, he denied chest pain, palpitations, diarrhea, constipation, back pain, or any sick contacts.    Hospital Course:  Unclear cause of ongoing fevers. Multiple infectious, diagnostic testing completed. Taken to OR for ex lap 2/6, loculated fluid collections found, cultures NGTD. Multiple blood cultures with NGTD. Thoracentesis 1/11, 1/18, 1/31, and 2/16, cultures NGTD. Paracentesis 1/11, 1/19, and 2/5, cultures NGTD. negative fungal markers/quant gold intermediate. Negative TTE.  IR drainage 1/11, cultures NGTD. Continued with intermittent fevers despite broad spectrum antibiotics that were initially started 1/11, then broadened antibiotics 2/22 from vanc/zosyn to ertapenem. Multiple CT scan of abd obtained that were unremarkable. Tagged WBCs scan 2/27 without source of infection found. He had several episodes of neutropenia requiring neupogen, last given 2/28. 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 Monitoring weekly CMV PCR. Chimerism studies obtained 3/1 to assess for GVHD, which was negative, EBV PCR negative, adenovirus negative. In addition to fevers, he has consistently c/o abdominal pain and N/V. Tbili has remain elevated post-op requiring multiple ERCPs. ERCPs performed this admission on 1/30, 2/19, and 3/9. ERCPs with sludge and stones with stents placed. Last ERCP 3/9 with metal stent placed into CBD and stones removed. His ertapenem was d/cd 3/7 due to AMS. Following ERCP 3/9, he was started on cefepime and flagyl  for possible cholangitis. He has remained afebrile since 3/10. He completed cefepime/flagyl after 3 weeks on 3/19 & 3/20. Due to elevated enzymes, he had a liver biopsy 1/23 consistent with cholestatic hepatitis (no cultures obtained during biopsy). GI was consulted for persistent N/V and malnutrition. EGD performed 1/25 and 3/6, EGD 3/6 with normal esophagus congested, erythematous and nodular mucosa in the stomach, one duodenal ulcer with a clean ulcer base (Emir Class III), biopsies taken, negative for CMV and GVHD. Due to N/V and abdominal pain, multiple attempts were made to place SHANE tube with tube feeding, but patient did not tolerate TF. Therefore, he was transitioned to TPN 1/16 then again on 2/24. He also has chronic constipation. Small bowel follow through study with normal findings. Following EGD 1/25, he was started on a PPI, miralax, and Metamucil, but he did not tolerate meds. For appetite stimulation he was trialed on marinol. He also did well with reglan before meals and at bedtime. Early post-op liver transplant surgery, he was switched to cyclosporine for seizures. During this admission, he was transitioned back to prograf 2/24 with keppra on board. Starting 3/1 he developed stuttering speech, disorientation. CT head 3/1 and MRI head 3/6 unremarkable. EEG 3/8 with no seizure activity. He started on rapamune and stress dose steroids 3/10, then ultimately returned to cyclosporine 3/13 and rapamune d/cd same day. Since transitioning back to cyclosporine, his mental status has remained stable. During admission, patient was evaluated by [facility] for future kidney transplant, and he was deemed to be a suitable candidate once medically stable and cleared. He dialyzes on a M-W-F schedule. He was anuric on admission, but starting producing urine around 3/16.  Now producing >2-3L per day.       Interval History: No acute events overnight. Slowly trying to improve caloric intake, goal 300-500 jalen/day. Was  able to eat full lunch today. Continue with half nocturnal TPN. Continue reglan and marinol.  Has taken multiple walks and worked with PT. Patient reports stability of abdominal pain today. Last HD 3/29 (plan is twice weekly until starts clearing)- creatinine improved slightly today (3.9 to 3.7)- plan for HD tomorrow. Holding Torsemide 50 mg daily for now.  He remains afebrile off antibiotics. Cont phos binder. Monitor.     Scheduled Meds:   sodium chloride 0.9%   Intravenous Once    sodium chloride 0.9%   Intravenous Once    bisacodyl  10 mg Oral Daily    cycloSPORINE modified  125 mg Oral BID    docusate sodium  100 mg Oral BID    dronabinol  2.5 mg Oral QHS    epoetin maurisio (PROCRIT) injection  12,600 Units Intravenous Every Mon, Wed, Fri    ergocalciferol  50,000 Units Oral Q7 Days    heparin (porcine)  5,000 Units Subcutaneous Q8H    levetiracetam oral soln  500 mg Oral BID    levothyroxine  75 mcg Oral Before breakfast    metoclopramide HCl  10 mg Oral QID (AC & HS)    omeprazole  40 mg Oral QAM    predniSONE  10 mg Oral Daily    sevelamer carbonate  800 mg Oral TID WM    ursodiol  300 mg Oral BID     Continuous Infusions:   TPN ADULT CENTRAL LINE CUSTOM       PRN Meds:sodium chloride 0.9%, acetaminophen, albuterol-ipratropium 2.5mg-0.5mg/3mL, bisacodyl, butalbital-acetaminophen-caffeine -40 mg, dextrose 50%, dextrose 50%, diphenhydrAMINE-zinc acetate 1-0.1%, glucagon (human recombinant), glucose, glucose, heparin (porcine), naloxone, ondansetron, ondansetron, oxyCODONE, oxyCODONE, prochlorperazine, prochlorperazine, simethicone, sodium chloride 0.9%, sodium chloride 0.9%    Review of Systems   Constitutional: Positive for activity change, appetite change and fatigue. Negative for chills and fever.   HENT: Negative for congestion and facial swelling.    Eyes: Negative for pain, discharge and visual disturbance.   Respiratory: Negative.  Negative for cough, chest tightness, shortness of  breath and wheezing.    Cardiovascular: Negative.  Negative for chest pain, palpitations and leg swelling.   Gastrointestinal: Positive for abdominal distention and abdominal pain. Negative for constipation, diarrhea, nausea and vomiting.   Endocrine: Negative.    Genitourinary: Negative for decreased urine volume, difficulty urinating, dysuria, hematuria and urgency.   Musculoskeletal: Negative for back pain, gait problem, neck pain and neck stiffness.   Skin: Negative for color change and pallor.   Allergic/Immunologic: Positive for immunocompromised state.   Neurological: Negative for dizziness, seizures, weakness, light-headedness and headaches.   Psychiatric/Behavioral: Negative for behavioral problems, confusion, dysphoric mood, hallucinations, sleep disturbance and suicidal ideas. The patient is not nervous/anxious.      Objective:     Vital Signs (Most Recent):  Temp: 97.7 °F (36.5 °C) (04/05/18 0348)  Pulse: 78 (04/05/18 0917)  Resp: 16 (04/05/18 0917)  BP: 130/83 (04/05/18 0917)  SpO2: 97 % (04/05/18 0917) Vital Signs (24h Range):  Temp:  [97.7 °F (36.5 °C)-98.6 °F (37 °C)] 97.7 °F (36.5 °C)  Pulse:  [67-78] 78  Resp:  [16-20] 16  SpO2:  [95 %-98 %] 97 %  BP: (122-137)/(72-85) 130/83     Weight: 64.4 kg (141 lb 15.6 oz)  Body mass index is 22.93 kg/m².    Intake/Output - Last 3 Shifts       04/03 0700 - 04/04 0659 04/04 0700 - 04/05 0659 04/05 0700 - 04/06 0659    P.O. 1160 1680     .4 756.1 122.5    Total Intake(mL/kg) 1788.4 (28.2) 2436.1 (37.8) 122.5 (1.9)    Urine (mL/kg/hr) 600 (0.4) 1325 (0.9)     Stool 0 (0) 0 (0)     Total Output 600 1325      Net +1188.4 +1111.1 +122.5           Stool Occurrence 0 x 0 x           Physical Exam   Constitutional: He is oriented to person, place, and time. He appears well-developed.   Hand and temporal muscle wasting   HENT:   Head: Normocephalic.   Eyes: Pupils are equal, round, and reactive to light. No scleral icterus.   Cardiovascular: Normal rate, regular  rhythm, normal heart sounds and intact distal pulses.    Pulmonary/Chest: Effort normal and breath sounds normal. No respiratory distress. He has no wheezes. He has no rhonchi. He has no rales.   Abdominal: Soft. Bowel sounds are normal. He exhibits distension. There is no tenderness.   Musculoskeletal: Normal range of motion. He exhibits edema (generalized).   Neurological: He is alert and oriented to person, place, and time.   Skin: Skin is warm and dry.   Psychiatric: He has a normal mood and affect. His behavior is normal. Judgment and thought content normal.   Nursing note and vitals reviewed.      Laboratory:  Immunosuppressants         Stop Route Frequency     cycloSPORINE modified capsule 125 mg      -- Oral 2 times daily     cycloSPORINE modified (NEORAL) 25 MG capsule      03/09 1714       cycloSPORINE modified (NEORAL) 100 MG capsule      03/09 1714       cycloSPORINE modified (NEORAL) 100 MG capsule      03/09 0514       cycloSPORINE modified (NEORAL) 25 MG capsule      03/09 0514          CBC:   Recent Labs  Lab 04/05/18  0500   WBC 2.78*   RBC 2.49*   HGB 8.2*   HCT 25.6*   PLT 66*   *   MCH 32.9*   MCHC 32.0     CMP:   Recent Labs  Lab 04/05/18  0500   GLU 96   CALCIUM 9.4   ALBUMIN 2.8*   PROT 6.0      K 4.8   CO2 25      BUN 99*   CREATININE 3.7*   ALKPHOS 113   ALT 7*   AST 16   BILITOT 1.1*     Coagulation: No results for input(s): PT, INR, APTT in the last 168 hours.  Labs within the past 24 hours have been reviewed.    Diagnostic Results:  pertinent imaging reviewed    Assessment/Plan:     Biliary stricture of transplanted liver    - ERCP 12/6 with post-anastomosis stricture with stent placement.  - Tbili with increase prompting ERCP 1/30 with stones and sludge, stents placed.  - Repeat ERCP 2/19 as was having recurrent fevers with bilirubin stuck at 2.0-2.2 with overall unremarkable findings seen. Did place new larger stents.   - ERCP/EUS 3/9 with metal stent placed in bile  duct, stones removed.  - Tbili remains stable.   - Continue actigall.        Nausea and vomiting    - h/o constipation, abdominal pain, and N/V  - worsened with SHANE tube and TF --> now on TPN  - encourage bowel regimen  - EGD performed 1/25 and 3/6, gastroparesis, duodenal ulcer, and congested/erythematous gastric mucosa  - Small bowel through with normal findings  - Continue reglan and marinol scheduled, anti-emetics PRN.   - If cont with GI symptoms then may need to consider Flex sig/ C-scope to further eval.   - Abd xray with distended colon - did not want enema  - D/C Cellcept 3/31 (has met protocol for length of treatment)  - Abd pain improved today, if worsens will perform CT A/P        Severe protein-calorie malnutrition    - Poor PO intake since transplant requiring short course of TPN during previous hospital stay.   - Due to N/V and abdominal pain, multiple attempts were made to place SHANE tube with tube feeding, but patient did not tolerate TF  - Transitioned to TPN 1/16 then again on 2/24 due to poor tolerance of SHANE tube/TF  - prealbumin 7 on 1/15 --> prealbumin 22 on 3/19, monitoring weekly (Mondays)  - EGD performed 1/25 and 3/6, gastroparesis, duodenal ulcer, and congested/erythematous gastric mucosa  - Small bowel through with normal findings.  - Consulted GI for GJ tube placement, but was canceled due to ongoing fevers  - Hold Reglan and Marinol 3/8 for neuro symptoms --> restarted reglan and marinol 3/19, will need to dc marinol before discharge because not covered by insurance.  - set goals for caloric intake with patient, continue nocturnal TPN started 3/20 PM to encourage appetite during the day  - Trying to set small caloric goals daily, for now goal is 300-500 jalen/day. Ultimately needs to consume 700 jalen/day to start weaning TPN.    -met with dietician 4/3/18 to discuss dietary goals. Intake significantly improved.   - Continue half dose TPN for now        Constipation    - chronic  -  encourage ambulation, stool softeners and laxatives, often refuses some meds 2/2 nausea, will often require suppository or brown bomb enema to have a BM.  - Small bowel follow through with normal findings.  - Avoid narcotics -need to ween        Liver transplanted    - Post op course complicated by fevers and hyperbilirubinemia, AST/ALT stable.  - see biliary stricture of transplanted liver  - Liver biopsy 1/23 without rejection.  - Paracentesis 3/6 negative for infection, wbc 28 segs 3%.  - ERCP, EUS biopsy 3/9/18 - no rejection, mild cholestatic hepatitis seen.   - T bili normalized        Long-term use of immunosuppressant medication    - Maintenance IS with cyclosporine --> transitioned to Prograf 2/24 for continued nausea but did not tolerate from neuro standpoint.  PT IS NOT A CANDIDATE FOR TACROLIMUS.    - Transitioned back to cyclosporine 3/1  - D/c cyclosporine, started rapa & stress dose steroids 3/10 for continued confusion  - Mental status now improved, re-trial cyclosporine 3/13, d/c rapa 3/13  - Transition back to prednisone taper from stress dose steroids 3/15 --> will decrease by 5 mg q week (decrease to 5 mg 4/6).  - Restarted low dose MMF 3/23- with nausea --> D/C Cellcept 3/31 --> nausea improved --> continue to hold as has passed protocol for Cellcept        At risk for opportunistic infections    - See other cytomegaloviral disease  - Pentam given 3/27  - D/C Isavu 3/21.        Prophylactic immunotherapy    - See long term use of immunosuppression.         Delayed surgical wound healing    - Wd vac removed 1/12/18.    - Wound healing well.   - Changing dressing q Tuesday, last 3/27.        Anemia of chronic disease    - Continue Procrit  - Monitor daily labs. H/H stable, transfuse PRN.         Hyperphosphatemia    - cont renvela. Monitor.         Vitamin D deficiency    - Continue ergo 50K weekly.         Other cytomegaloviral diseases    - detected at 370 on CMV PCR 2/1.  - case discussed with  ID and will hold off on treatment at this time given low WBC.   - undetected CMV PCR 2/8, 2/15, 2/22, 3/8   - continue Valcyte 200 mg every Mon, Wed, Fri.  - Per ID, valcyte dose decreased to ppx dose on 2/18--Valcyte d/c'd 2/25  - Repeat scope 3/6 to assess persistent N/V and possible CMV causing these symptoms. Biopsies thus far normal, cmv stains negative.  - continue to monitor weekly CMV PCR's, last PCR neg from 3/15, recent level from 3/22 undetected.  - CMV 3/29 undetected.        Seizure    - 3/1, patient with stuttering speech, trouble recalling specific words when speaking. Does have hx of seizure (on keppra) previously while on prograf. Switched to prograf over the weekend from cyclosporine. Neuro exam unremarkable.   - CT head without contrast obtained, unremarkable.   - In light of these symptoms, prograf d/c, restart cyclosporine 3/1.  - remains w/o seizure. Cont to have stutter/garbled speech/difficulty finding words.  Worse post EGD as sedation was given.  - MRI 3/6 unremarkable.  - EEG completed 3/8 - mild, generalized, non-specific cerebral dysfunction, no epileptiform activity.  - monitor closely. VSS.        Acute renal failure with tubular necrosis    - HD resumed on previous admission. Anuric on admission and dialyzes M-W-F.  - Nephrology following.    - Goal is for kidney txp in future- currently not a candidate due to health status.  - urine output has improved since 3/16 --> 2.6 L 3/27!  - HD held 3/21 & 3/23  - Nephrology started torsemide 50 mg daily 3/24- continue for now for volume management  - BUN elevated though on TPN, HD 3/29/18   - Will plan HD twice weekly for now   HD held 4/3, plan for HD 4/6  - Hold torsemide for now (d/roselia on 3/31)            VTE Risk Mitigation         Ordered     heparin (porcine) injection 1,000 Units  As needed (PRN)     Route:  Intra-Catheter        03/12/18 1134     heparin (porcine) injection 5,000 Units  Every 8 hours     Route:  Subcutaneous         03/08/18 1622     Medium Risk of VTE  Once      01/11/18 0351     Place sequential compression device  Until discontinued      01/11/18 0351          The patients clinical status was discussed at multidisplinary rounds, involving transplant surgery, transplant medicine, pharmacy, nursing, nutrition, and social work    Discharge Planning: not a candidate for d/c at this time.       Fransisca Thomas PA-C  Liver Transplant  [facility]

## 2018-04-06 LAB
ALBUMIN SERPL BCP-MCNC: 3 G/DL
ALP SERPL-CCNC: 121 U/L
ALT SERPL W/O P-5'-P-CCNC: 7 U/L
ANION GAP SERPL CALC-SCNC: 12 MMOL/L
AST SERPL-CCNC: 17 U/L
BASOPHILS # BLD AUTO: 0.01 K/UL
BASOPHILS NFR BLD: 0.3 %
BILIRUB SERPL-MCNC: 1.1 MG/DL
BUN SERPL-MCNC: 104 MG/DL
CALCIUM SERPL-MCNC: 9.4 MG/DL
CHLORIDE SERPL-SCNC: 101 MMOL/L
CMV DNA SERPL NAA+PROBE-ACNC: NORMAL IU/ML
CO2 SERPL-SCNC: 26 MMOL/L
CREAT SERPL-MCNC: 3.9 MG/DL
CYCLOSPORINE BLD LC/MS/MS-MCNC: 156 NG/ML
DIFFERENTIAL METHOD: ABNORMAL
EOSINOPHIL # BLD AUTO: 0.1 K/UL
EOSINOPHIL NFR BLD: 2.8 %
ERYTHROCYTE [DISTWIDTH] IN BLOOD BY AUTOMATED COUNT: 15.1 %
EST. GFR  (AFRICAN AMERICAN): 22.7 ML/MIN/1.73 M^2
EST. GFR  (NON AFRICAN AMERICAN): 19.7 ML/MIN/1.73 M^2
GLUCOSE SERPL-MCNC: 93 MG/DL
HCT VFR BLD AUTO: 26.5 %
HGB BLD-MCNC: 8.4 G/DL
IMM GRANULOCYTES # BLD AUTO: 0.01 K/UL
IMM GRANULOCYTES NFR BLD AUTO: 0.3 %
LYMPHOCYTES # BLD AUTO: 0.9 K/UL
LYMPHOCYTES NFR BLD: 28.2 %
MAGNESIUM SERPL-MCNC: 2.3 MG/DL
MCH RBC QN AUTO: 32.7 PG
MCHC RBC AUTO-ENTMCNC: 31.7 G/DL
MCV RBC AUTO: 103 FL
MONOCYTES # BLD AUTO: 0.4 K/UL
MONOCYTES NFR BLD: 13.2 %
NEUTROPHILS # BLD AUTO: 1.8 K/UL
NEUTROPHILS NFR BLD: 55.2 %
NRBC BLD-RTO: 0 /100 WBC
PHOSPHATE SERPL-MCNC: 6.9 MG/DL
PLATELET # BLD AUTO: 61 K/UL
PMV BLD AUTO: 11.6 FL
POTASSIUM SERPL-SCNC: 4.5 MMOL/L
PROT SERPL-MCNC: 6.1 G/DL
RBC # BLD AUTO: 2.57 M/UL
SODIUM SERPL-SCNC: 139 MMOL/L
WBC # BLD AUTO: 3.19 K/UL

## 2018-04-06 PROCEDURE — 25000003 PHARM REV CODE 250: Performed by: PHYSICIAN ASSISTANT

## 2018-04-06 PROCEDURE — 25000003 PHARM REV CODE 250: Performed by: NURSE PRACTITIONER

## 2018-04-06 PROCEDURE — 63600175 PHARM REV CODE 636 W HCPCS: Performed by: NURSE PRACTITIONER

## 2018-04-06 PROCEDURE — 80158 DRUG ASSAY CYCLOSPORINE: CPT

## 2018-04-06 PROCEDURE — 85025 COMPLETE CBC W/AUTO DIFF WBC: CPT

## 2018-04-06 PROCEDURE — 20600001 HC STEP DOWN PRIVATE ROOM

## 2018-04-06 PROCEDURE — 84100 ASSAY OF PHOSPHORUS: CPT

## 2018-04-06 PROCEDURE — 94664 DEMO&/EVAL PT USE INHALER: CPT

## 2018-04-06 PROCEDURE — 99233 SBSQ HOSP IP/OBS HIGH 50: CPT | Mod: ,,, | Performed by: PHYSICIAN ASSISTANT

## 2018-04-06 PROCEDURE — 99900035 HC TECH TIME PER 15 MIN (STAT)

## 2018-04-06 PROCEDURE — 97803 MED NUTRITION INDIV SUBSEQ: CPT | Performed by: DIETITIAN, REGISTERED

## 2018-04-06 PROCEDURE — 80053 COMPREHEN METABOLIC PANEL: CPT

## 2018-04-06 PROCEDURE — 63600175 PHARM REV CODE 636 W HCPCS: Mod: JG | Performed by: NURSE PRACTITIONER

## 2018-04-06 PROCEDURE — 25000003 PHARM REV CODE 250

## 2018-04-06 PROCEDURE — 90935 HEMODIALYSIS ONE EVALUATION: CPT | Mod: ,,, | Performed by: NURSE PRACTITIONER

## 2018-04-06 PROCEDURE — 83735 ASSAY OF MAGNESIUM: CPT

## 2018-04-06 PROCEDURE — 63600175 PHARM REV CODE 636 W HCPCS: Performed by: PHYSICIAN ASSISTANT

## 2018-04-06 RX ORDER — SODIUM CHLORIDE 9 MG/ML
INJECTION, SOLUTION INTRAVENOUS ONCE
Status: DISCONTINUED | OUTPATIENT
Start: 2018-04-06 | End: 2018-04-10

## 2018-04-06 RX ORDER — PREDNISONE 5 MG/1
5 TABLET ORAL DAILY
Status: DISCONTINUED | OUTPATIENT
Start: 2018-04-08 | End: 2018-04-10 | Stop reason: HOSPADM

## 2018-04-06 RX ORDER — PREDNISONE 10 MG/1
10 TABLET ORAL DAILY
Status: DISPENSED | OUTPATIENT
Start: 2018-04-06 | End: 2018-04-08

## 2018-04-06 RX ADMIN — METOCLOPRAMIDE HYDROCHLORIDE 10 MG: 5 SOLUTION ORAL at 08:04

## 2018-04-06 RX ADMIN — METOCLOPRAMIDE HYDROCHLORIDE 10 MG: 5 SOLUTION ORAL at 12:04

## 2018-04-06 RX ADMIN — HEPARIN SODIUM 1000 UNITS: 1000 INJECTION, SOLUTION INTRAVENOUS; SUBCUTANEOUS at 10:04

## 2018-04-06 RX ADMIN — SEVELAMER CARBONATE 800 MG: 800 TABLET, FILM COATED ORAL at 12:04

## 2018-04-06 RX ADMIN — LEVETIRACETAM 500 MG: 100 SOLUTION ORAL at 01:04

## 2018-04-06 RX ADMIN — CYCLOSPORINE 125 MG: 100 CAPSULE, LIQUID FILLED ORAL at 07:04

## 2018-04-06 RX ADMIN — OXYCODONE HYDROCHLORIDE 10 MG: 5 TABLET ORAL at 07:04

## 2018-04-06 RX ADMIN — OMEPRAZOLE 40 MG: 40 CAPSULE, DELAYED RELEASE ORAL at 07:04

## 2018-04-06 RX ADMIN — BUTALBITAL, ACETAMINOPHEN AND CAFFEINE 1 TABLET: 50; 325; 40 TABLET ORAL at 01:04

## 2018-04-06 RX ADMIN — CYCLOSPORINE 150 MG: 100 CAPSULE, LIQUID FILLED ORAL at 06:04

## 2018-04-06 RX ADMIN — LEVETIRACETAM 500 MG: 100 SOLUTION ORAL at 08:04

## 2018-04-06 RX ADMIN — URSODIOL 300 MG: 300 CAPSULE ORAL at 08:04

## 2018-04-06 RX ADMIN — PROCHLORPERAZINE EDISYLATE 10 MG: 5 INJECTION INTRAMUSCULAR; INTRAVENOUS at 03:04

## 2018-04-06 RX ADMIN — OXYCODONE HYDROCHLORIDE 5 MG: 5 TABLET ORAL at 08:04

## 2018-04-06 RX ADMIN — ERYTHROPOIETIN 12600 UNITS: 20000 INJECTION, SOLUTION INTRAVENOUS; SUBCUTANEOUS at 12:04

## 2018-04-06 RX ADMIN — DOCUSATE SODIUM 100 MG: 100 CAPSULE, LIQUID FILLED ORAL at 08:04

## 2018-04-06 RX ADMIN — OXYCODONE HYDROCHLORIDE 10 MG: 5 TABLET ORAL at 10:04

## 2018-04-06 RX ADMIN — DRONABINOL 2.5 MG: 2.5 CAPSULE ORAL at 08:04

## 2018-04-06 RX ADMIN — LEVOTHYROXINE SODIUM 75 MCG: 75 TABLET ORAL at 07:04

## 2018-04-06 NOTE — PROGRESS NOTES
Arrived per W/C. 3 hr dialysis trx started via [facility]. GURMEET Turner NP at bedside at start of trx.

## 2018-04-06 NOTE — ASSESSMENT & PLAN NOTE
Nutrition Diagnosis  Inadequate oral intake    Related to (etiology):   Decreased appetite, nausea with eating    Signs and Symptoms (as evidenced by):   Pt eating 0%-50% of meals and relying on TPN nutrition    Interventions/Recommendations (treatment strategy):  See recs    Nutrition Diagnosis Status:   Improving

## 2018-04-06 NOTE — PLAN OF CARE
Problem: Patient Care Overview  Goal: Plan of Care Review  Outcome: Outcome(s) achieved Date Met: 04/06/18  Pt AAO this morning. Creat inc 3.9- 3 hrs HD with clearance performed in HD unit.  Mom at bedside.  Pt ate very large lunch - nauseated after- only to take a few meds upon return  IV compazine given. Pain meds given x1 today in HD.  TPN to [facility] - finished before leaving for HD.  Dressing reinforced twice as does not want to stick- dressing changed yesterday.  Voiding in urinal and recording for nurses on white board.  Walking with a cane - gait slow but steady.  VSS. Pt looking forward to going home next week - awaiting decision on HD schedule. Encouraged pt to get out of room and ambulate downstairs and to have small meals to avoid nausea.

## 2018-04-06 NOTE — ASSESSMENT & PLAN NOTE
- h/o constipation, abdominal pain, and N/V  - worsened with SHANE tube and TF --> now on TPN  - encourage bowel regimen  - EGD performed 1/25 and 3/6, gastroparesis, duodenal ulcer, and congested/erythematous gastric mucosa  - Small bowel through with normal findings  - Continue reglan and marinol scheduled, anti-emetics PRN.   - If cont with GI symptoms then may need to consider Flex sig/ C-scope to further eval.   - Abd xray with distended colon - did not want enema  - D/C Cellcept 3/31 (has met protocol for length of treatment)  - Abd pain stable today, if worsens will perform CT A/P

## 2018-04-06 NOTE — PROGRESS NOTES
" [facility]  Adult Nutrition  Progress Note    SUMMARY       Recommendations    Recommendation/Intervention:   Continue regular diet, encouraging 3 meals per day plus snacks. Encourage patient to meet 100% EEN / EPN -  2100kcal, 90g protein per day.    RD following.      Goals: 1. 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, complicate post-op w/ LAURA ongoing, chronic malnutrition, cirrhosis 2' EtOH  Interdisciplinary Rounds: attended    General Information Comments: TPN to be d/c today, pt appetite improved significantly in past week, eating 1-2 meals/day at this time, HD this AM, hoping for d/c next week, often going downstairs for meals with mom    Nutrition Discharge Planning: Adequate PO intake    Nutrition Risk Screen    Nutrition Risk Screen: large or nonhealing wound, burn or pressure ulcer    Nutrition/Diet History    Patient Reported Diet/Restrictions/Preferences: low salt  Typical Food/Fluid Intake: eating ~800-1000 kcal/day at this time  Food Preferences: No cultural or Worship food preferences noted  Do you have any cultural, spiritual, Worship conflicts, given your current situation?: none  (Retired) Meal/Snack Patterns: pt likes to cook, when feeling well will cook and then only eat bites  Supplemental Drinks or Food Habits:  (protein powders sometimes)    Factors Affecting Nutritional Intake: altered gastrointestinal function, abdominal distention, decreased appetite, nausea/vomiting    Anthropometrics    Temp: 97.5 °F (36.4 °C)  Height Method: Stated  Height: 5' 5.98" (167.6 cm)  Height (inches): 65.98 in  Weight Method: Standard Scale  Weight: 64.4 kg (141 lb 15.6 oz)  Weight (lb): 141.98 lb  Ideal Body Weight (IBW), Male: " 141.88 lb  % Ideal Body Weight, Male (lb): 100.07 lb  BMI (Calculated): 23  BMI Grade: 18.5-24.9 - normal  Weight Loss: unintentional  Usual Body Weight (UBW), k kg (2017 )  % Usual Body Weight: 86.71  % Weight Change From Usual Weight: -13.47 %       Lab/Procedures/Meds    (RETIRED) Diagnostic Test/Procedure Review: reviewed, pertinent  Pertinent Labs Reviewed: reviewed  Pertinent Labs Comments: , Cr 3.9, GFR 19.7, P 6.9, TBili 1.1  Pertinent Medications Reviewed: reviewed  Pertinent Medications Comments: bisacodyl, docusate, marinol, ergocalciferol, sevelamer, prednisone, reglan    Physical Findings/Assessment    Overall Physical Appearance: generalized wasting, loss of muscle mass, loss of subcutaneous fat, weak  Tubes:  (-)  Oral/Mouth Cavity: WDL  Skin: intact    Estimated/Assessed Needs    Weight Used For Calorie Calculations: 70.2 kg (154 lb 12.2 oz)  Energy Calorie Requirements (kcal): 2434-9031 (30-35 kcal/kg)  Energy Need Method: Kcal/kg  Protein Requirements: 81-94g (1.3-1.5 g/kg)  Weight Used For Protein Calculations: 62.3 kg (137 lb 5.6 oz)  Fluid Requirements (mL): 1mL/kcal  Fluid Need Method: RDA Method  RDA Method (mL):          Nutrition Prescription Ordered    Current Diet Order: Regular  Nutrition Order Comments: -  Current Nutrition Support Formula Ordered:  (30g AA / 100g dex plus IV lipids)  Current Nutrition Support Formula Ordered:  (discontinued)  Current Nutrition Support Rate Ordered: 38 (ml)  Current Nutrition Support Frequency Ordered: mL/hr  Oral Nutrition Supplement: Boost Breeze    Evaluation of Received Nutrient/Fluid Intake      Parenteral Calories (kcal): 0 - Discontinued today  Parenteral Protein (gm): 0  Parenteral Fluid (mL): 0  Lipid Calories (kcals): 0 kcals  GIR (Glucose Infusion Rate) (mg/kg/min): 0 mg/kg/min  Total Calories (kcal): 0  Total Calories (kcal/kg): 0    % Kcal Needs: 50  % Protein Needs: 50  I/O: -4 L since admit  Energy Calories Required:  not meeting needs  Protein Required: not meeting needs  Fluid Required: meeting needs  Comments: LBM 4/5  Tolerance: tolerating  % Intake of Estimated Energy Needs: 50 - 75 %  % Meal Intake: 50 - 75 %    Nutrition Risk    Level of Risk/Frequency of Follow-up:  (2x/week)     Assessment and Plan    Severe protein-calorie malnutrition    Nutrition Diagnosis  Inadequate oral intake    Related to (etiology):   Decreased appetite, nausea with eating    Signs and Symptoms (as evidenced by):   Pt eating 0%-50% of meals and relying on TPN nutrition    Interventions/Recommendations (treatment strategy):  See recs    Nutrition Diagnosis Status:   Improving             Monitor and Evaluation    Food and Nutrient Intake: energy intake, food and beverage intake  Food and Nutrient Adminstration: diet order  Knowledge/Beliefs/Attitudes: food and nutrition knowledge/skill  Physical Activity and Function: nutrition-related ADLs and IADLs  Anthropometric Measurements: weight, weight change, body mass index  Biochemical Data, Medical Tests and Procedures: electrolyte and renal panel, lipid profile, gastrointestinal profile, glucose/endocrine profile, inflammatory profile  Nutrition-Focused Physical Findings: overall appearance     Nutrition Follow-Up    RD Follow-up?: Yes

## 2018-04-06 NOTE — SUBJECTIVE & OBJECTIVE
Scheduled Meds:   sodium chloride 0.9%   Intravenous Once    sodium chloride 0.9%   Intravenous Once    sodium chloride 0.9%   Intravenous Once    bisacodyl  10 mg Oral Daily    cycloSPORINE modified  125 mg Oral BID    docusate sodium  100 mg Oral BID    dronabinol  2.5 mg Oral QHS    epoetin maurisio (PROCRIT) injection  12,600 Units Intravenous Every Mon, Wed, Fri    ergocalciferol  50,000 Units Oral Q7 Days    heparin (porcine)  5,000 Units Subcutaneous Q8H    levetiracetam oral soln  500 mg Oral BID    levothyroxine  75 mcg Oral Before breakfast    metoclopramide HCl  10 mg Oral QID (AC & HS)    omeprazole  40 mg Oral QAM    predniSONE  10 mg Oral Daily    Followed by    [START ON 4/8/2018] predniSONE  5 mg Oral Daily    sevelamer carbonate  800 mg Oral TID WM    ursodiol  300 mg Oral BID     Continuous Infusions:  PRN Meds:sodium chloride 0.9%, acetaminophen, albuterol-ipratropium 2.5mg-0.5mg/3mL, bisacodyl, butalbital-acetaminophen-caffeine -40 mg, dextrose 50%, dextrose 50%, diphenhydrAMINE-zinc acetate 1-0.1%, glucagon (human recombinant), glucose, glucose, heparin (porcine), naloxone, ondansetron, ondansetron, oxyCODONE, oxyCODONE, prochlorperazine, prochlorperazine, simethicone, sodium chloride 0.9%, sodium chloride 0.9%    Review of Systems   Constitutional: Positive for activity change, appetite change and fatigue. Negative for chills and fever.   HENT: Negative for congestion and facial swelling.    Eyes: Negative for pain, discharge and visual disturbance.   Respiratory: Negative.  Negative for cough, chest tightness, shortness of breath and wheezing.    Cardiovascular: Negative.  Negative for chest pain, palpitations and leg swelling.   Gastrointestinal: Positive for abdominal distention and abdominal pain. Negative for constipation, diarrhea, nausea and vomiting.   Endocrine: Negative.    Genitourinary: Negative for decreased urine volume, difficulty urinating, dysuria, hematuria  and urgency.   Musculoskeletal: Negative for back pain, gait problem, neck pain and neck stiffness.   Skin: Negative for color change and pallor.   Allergic/Immunologic: Positive for immunocompromised state.   Neurological: Negative for dizziness, seizures, weakness, light-headedness and headaches.   Psychiatric/Behavioral: Negative for behavioral problems, confusion, dysphoric mood, hallucinations, sleep disturbance and suicidal ideas. The patient is not nervous/anxious.      Objective:     Vital Signs (Most Recent):  Temp: 97.5 °F (36.4 °C) (04/06/18 0915)  Pulse: 79 (04/06/18 1215)  Resp: 20 (04/06/18 0318)  BP: 127/74 (04/06/18 1215)  SpO2: 96 % (04/06/18 0318) Vital Signs (24h Range):  Temp:  [97.5 °F (36.4 °C)-98.6 °F (37 °C)] 97.5 °F (36.4 °C)  Pulse:  [61-79] 79  Resp:  [18-20] 20  SpO2:  [96 %-100 %] 96 %  BP: (115-148)/(74-86) 127/74     Weight: 64.4 kg (141 lb 15.6 oz)  Body mass index is 22.93 kg/m².    Intake/Output - Last 3 Shifts       04/04 0700 - 04/05 0659 04/05 0700 - 04/06 0659 04/06 0700 - 04/07 0659    P.O. 1680 799 100    .1 135.3 337.8    Total Intake(mL/kg) 2436.1 (37.8) 934.3 (14.5) 437.8 (6.8)    Urine (mL/kg/hr) 1325 (0.9) 955 (0.6) 300 (0.8)    Emesis/NG output  0 (0)     Other  0 (0)     Stool 0 (0) 0 (0)     Total Output 1325 955 300    Net +1111.1 -20.7 +137.8           Urine Occurrence  0 x     Stool Occurrence 0 x 1 x     Emesis Occurrence  1 x           Physical Exam   Constitutional: He is oriented to person, place, and time. He appears well-developed.   Hand and temporal muscle wasting   HENT:   Head: Normocephalic.   Eyes: Pupils are equal, round, and reactive to light. No scleral icterus.   Cardiovascular: Normal rate, regular rhythm, normal heart sounds and intact distal pulses.    Pulmonary/Chest: Effort normal and breath sounds normal. No respiratory distress. He has no wheezes. He has no rhonchi. He has no rales.   Abdominal: Soft. Bowel sounds are normal. He  exhibits distension. There is no tenderness.   Musculoskeletal: Normal range of motion. He exhibits edema (generalized).   Neurological: He is alert and oriented to person, place, and time.   Skin: Skin is warm and dry.   Psychiatric: He has a normal mood and affect. His behavior is normal. Judgment and thought content normal.   Nursing note and vitals reviewed.      Laboratory:  Immunosuppressants         Stop Route Frequency     cycloSPORINE modified capsule 125 mg      -- Oral 2 times daily     cycloSPORINE modified (NEORAL) 25 MG capsule      03/09 1714       cycloSPORINE modified (NEORAL) 100 MG capsule      03/09 1714       cycloSPORINE modified (NEORAL) 100 MG capsule      03/09 0514       cycloSPORINE modified (NEORAL) 25 MG capsule      03/09 0514          CBC:   Recent Labs  Lab 04/06/18  0400   WBC 3.19*   RBC 2.57*   HGB 8.4*   HCT 26.5*   PLT 61*   *   MCH 32.7*   MCHC 31.7*     CMP:   Recent Labs  Lab 04/06/18  0400   GLU 93   CALCIUM 9.4   ALBUMIN 3.0*   PROT 6.1      K 4.5   CO2 26      *   CREATININE 3.9*   ALKPHOS 121   ALT 7*   AST 17   BILITOT 1.1*     Labs within the past 24 hours have been reviewed.    Diagnostic Results:  pertinent imaging reviewed

## 2018-04-06 NOTE — ASSESSMENT & PLAN NOTE
- HD resumed on previous admission. Anuric on admission and dialyzes M-W-F.  - Nephrology following.    - Goal is for kidney txp in future- currently not a candidate due to health status.  - urine output has improved since 3/16  - HD held 3/21 & 3/23  - Nephrology started torsemide 50 mg daily 3/24  - BUN elevated though on TPN, HD 3/29/18   - Will plan HD twice weekly for now   HD held 4/3, plan for HD 4/6  - Hold torsemide for now (d/roselia on 3/31)

## 2018-04-06 NOTE — PLAN OF CARE
Problem: Patient Care Overview  Goal: Plan of Care Review  Outcome: Ongoing (interventions implemented as appropriate)    Pt AAOx4 with mother at the bedside.  Pt mother continues to be attentive to and supportive of pt and actively involved in pt care.    [facility] CDI.   Pt receiving nocturnal TPN.   TPN @ 35 mL/hr.   Pt keeping log of dietary intake on sheet in his room.    Pt with 1x episode of emesis after taking his PM medications. Pt requested to only retake medications that are absolutely necessary. Pt given another dose of Keppra, but all other PM meds held 2/2 N/V.   Pt given 10 mg IV Compazine.     R SC HD cath CDI.  Last HD 3/29. 0mL off. HD held yesterday 2/2 improved Cr & good UO.   HD ordered for tomorrow 8 AM, but assessing AM labs for need for HD daily.     Pt with only 100 mL in urine output thus far tonight.  No BM    Pt c/o 7/10 lower abdominal pain. Pt has received Oxy 10mg x2 thus far this shift.    Pt refused heparin shots.  Pt ambulated in halls accompanied by his mother.    Pt remained free from falls or injury thus far.   Bed is in low/ locked position, side rails are up x 2, call light is in reach.   Will continue to monitor.

## 2018-04-06 NOTE — PROGRESS NOTES
Pt complaining of nausea after eating large lunch - no emesis - IV compazine given at 1415- pt now sleeping. Pt was able to walk down to the cafeteria without using a wheelchair and used the wheelchair on the return trip

## 2018-04-06 NOTE — PROGRESS NOTES
Pt returned to room with mom at side per wheelchair. Belongings with pt.  Pt would like to walk to cafeteria to get lunch and will have meds upon returning.  VSS. Will let RN know when returns to the floor

## 2018-04-06 NOTE — PROGRESS NOTES
[facility]  Liver Transplant  Progress Note    Patient Name: Jhonny Diana  MRN: [id number]  Admission Date: 2018  Hospital Length of Stay: 85 days  Code Status: Full Code  Primary Care Provider: Primary Doctor No  Post-Operative Day: 169    ORGAN:   LIVER  Disease Etiology: Acute Alcoholic Hepatitis  Donor Type:    - Brain Death  CDC High Risk:   No  Donor CMV Status:   Donor CMV Status: Positive  Donor HBcAB:   Negative  Donor HCV Status:   Negative  Whole or Partial: Whole Liver  Biliary Anastomosis: End to End  Arterial Anatomy: Standard  Subjective:     History of Present Illness:  Jhonny Diana is a 28 y/o male with past medical history of alcoholic cirrhosis.  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 Found to have peritonitis in November (WBC 5000, 75% PNM) neg for bile leak. He was treated initially with vanc/cefepime. Repeat cell counts  with some improvement (WBC 1400, 45% PNM). He has undergone multiple paracenteses as well as abscess drainage of perihepatic fluid collections and treated with antimicrobial therapy but no positive cultures. Of note, biliary stricture also identified and ERCP performed on 2017 with sphincterotomy and biliary stent placed. Liver tests still have not normalized despite intervention, bilirubin and AP remain elevated. Other pertinent PMH current wound vac in place 2/2 wound infection, malnutrition requiring TPN for short course and ongoing hypoalbuminemia, and seizure activity while on prograf and has since been switched to cyclosporine without reoccurrence.  He presented to the ER for fever, abdominal pain, and N/V. He reports fever (103) for 1  day prior. Overnight, he developed N/V, reports small amount of green emesis with new left sided pain. He also endorses worsening SOB with exertion. He was scheduled as an outpatient for follow up paracentesis and IR drainage of fluid collection. CXR in ER shows large pleural effusion with subsegmental atelectasis. Infectious work up initiated in ER. His ANC is 900. Broad spectrum antibiotics initiated in ED. At admit, he denied chest pain, palpitations, diarrhea, constipation, back pain, or any sick contacts.    Hospital Course:  Unclear cause of ongoing fevers. Multiple infectious, diagnostic testing completed. Taken to OR for ex lap 2/6, loculated fluid collections found, cultures NGTD. Multiple blood cultures with NGTD. Thoracentesis 1/11, 1/18, 1/31, and 2/16, cultures NGTD. Paracentesis 1/11, 1/19, and 2/5, cultures NGTD. negative fungal markers/quant gold intermediate. Negative TTE.  IR drainage 1/11, cultures NGTD. Continued with intermittent fevers despite broad spectrum antibiotics that were initially started 1/11, then broadened antibiotics 2/22 from vanc/zosyn to ertapenem. Multiple CT scan of abd obtained that were unremarkable. Tagged WBCs scan 2/27 without source of infection found. He had several episodes of neutropenia requiring neupogen, last given 2/28. 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 Monitoring weekly CMV PCR. Chimerism studies obtained 3/1 to assess for GVHD, which was negative, EBV PCR negative, adenovirus negative. In addition to fevers, he has consistently c/o abdominal pain and N/V. Tbili has remain elevated post-op requiring multiple ERCPs. ERCPs performed this admission on 1/30, 2/19, and 3/9. ERCPs with sludge and stones with stents placed. Last ERCP 3/9 with metal stent placed into CBD and stones removed. His ertapenem was d/cd 3/7 due to AMS. Following ERCP 3/9, he was started on cefepime and flagyl  for possible cholangitis. He has remained afebrile since 3/10. He completed cefepime/flagyl after 3 weeks on 3/19 & 3/20. Due to elevated enzymes, he had a liver biopsy 1/23 consistent with cholestatic hepatitis (no cultures obtained during biopsy). GI was consulted for persistent N/V and malnutrition. EGD performed 1/25 and 3/6, EGD 3/6 with normal esophagus congested, erythematous and nodular mucosa in the stomach, one duodenal ulcer with a clean ulcer base (Emir Class III), biopsies taken, negative for CMV and GVHD. Due to N/V and abdominal pain, multiple attempts were made to place SHANE tube with tube feeding, but patient did not tolerate TF. Therefore, he was transitioned to TPN 1/16 then again on 2/24. He also has chronic constipation. Small bowel follow through study with normal findings. Following EGD 1/25, he was started on a PPI, miralax, and Metamucil, but he did not tolerate meds. For appetite stimulation he was trialed on marinol. He also did well with reglan before meals and at bedtime. Early post-op liver transplant surgery, he was switched to cyclosporine for seizures. During this admission, he was transitioned back to prograf 2/24 with keppra on board. Starting 3/1 he developed stuttering speech, disorientation. CT head 3/1 and MRI head 3/6 unremarkable. EEG 3/8 with no seizure activity. He started on rapamune and stress dose steroids 3/10, then ultimately returned to cyclosporine 3/13 and rapamune d/cd same day. Since transitioning back to cyclosporine, his mental status has remained stable. During admission, patient was evaluated by [facility] for future kidney transplant, and he was deemed to be a suitable candidate once medically stable and cleared. He dialyzes on a M-W-F schedule. He was anuric on admission, but starting producing urine around 3/16.  Now producing >2-3L per day.       Interval History: No acute events overnight. Slowly trying to improve caloric intake, goal 300-500 jalen/day. Was  able to eat full lunch and dinner yesterday. Had 1 episode of emesis yesterday evening with taking pills. Will hold TPN tonight. Continue reglan and marinol.  Last HD 3/29 (plan is twice weekly until starts clearing)- plan for HD today. Holding Torsemide 50 mg daily for now.  He remains afebrile off antibiotics. Cont phos binder. Monitor.     Scheduled Meds:   sodium chloride 0.9%   Intravenous Once    sodium chloride 0.9%   Intravenous Once    sodium chloride 0.9%   Intravenous Once    bisacodyl  10 mg Oral Daily    cycloSPORINE modified  125 mg Oral BID    docusate sodium  100 mg Oral BID    dronabinol  2.5 mg Oral QHS    epoetin maurisio (PROCRIT) injection  12,600 Units Intravenous Every Mon, Wed, Fri    ergocalciferol  50,000 Units Oral Q7 Days    heparin (porcine)  5,000 Units Subcutaneous Q8H    levetiracetam oral soln  500 mg Oral BID    levothyroxine  75 mcg Oral Before breakfast    metoclopramide HCl  10 mg Oral QID (AC & HS)    omeprazole  40 mg Oral QAM    predniSONE  10 mg Oral Daily    Followed by    [START ON 4/8/2018] predniSONE  5 mg Oral Daily    sevelamer carbonate  800 mg Oral TID WM    ursodiol  300 mg Oral BID     Continuous Infusions:  PRN Meds:sodium chloride 0.9%, acetaminophen, albuterol-ipratropium 2.5mg-0.5mg/3mL, bisacodyl, butalbital-acetaminophen-caffeine -40 mg, dextrose 50%, dextrose 50%, diphenhydrAMINE-zinc acetate 1-0.1%, glucagon (human recombinant), glucose, glucose, heparin (porcine), naloxone, ondansetron, ondansetron, oxyCODONE, oxyCODONE, prochlorperazine, prochlorperazine, simethicone, sodium chloride 0.9%, sodium chloride 0.9%    Review of Systems   Constitutional: Positive for activity change, appetite change and fatigue. Negative for chills and fever.   HENT: Negative for congestion and facial swelling.    Eyes: Negative for pain, discharge and visual disturbance.   Respiratory: Negative.  Negative for cough, chest tightness, shortness of breath and  wheezing.    Cardiovascular: Negative.  Negative for chest pain, palpitations and leg swelling.   Gastrointestinal: Positive for abdominal distention and abdominal pain. Negative for constipation, diarrhea, nausea and vomiting.   Endocrine: Negative.    Genitourinary: Negative for decreased urine volume, difficulty urinating, dysuria, hematuria and urgency.   Musculoskeletal: Negative for back pain, gait problem, neck pain and neck stiffness.   Skin: Negative for color change and pallor.   Allergic/Immunologic: Positive for immunocompromised state.   Neurological: Negative for dizziness, seizures, weakness, light-headedness and headaches.   Psychiatric/Behavioral: Negative for behavioral problems, confusion, dysphoric mood, hallucinations, sleep disturbance and suicidal ideas. The patient is not nervous/anxious.      Objective:     Vital Signs (Most Recent):  Temp: 97.5 °F (36.4 °C) (04/06/18 0915)  Pulse: 79 (04/06/18 1215)  Resp: 20 (04/06/18 0318)  BP: 127/74 (04/06/18 1215)  SpO2: 96 % (04/06/18 0318) Vital Signs (24h Range):  Temp:  [97.5 °F (36.4 °C)-98.6 °F (37 °C)] 97.5 °F (36.4 °C)  Pulse:  [61-79] 79  Resp:  [18-20] 20  SpO2:  [96 %-100 %] 96 %  BP: (115-148)/(74-86) 127/74     Weight: 64.4 kg (141 lb 15.6 oz)  Body mass index is 22.93 kg/m².    Intake/Output - Last 3 Shifts       04/04 0700 - 04/05 0659 04/05 0700 - 04/06 0659 04/06 0700 - 04/07 0659    P.O. 1680 799 100    .1 135.3 337.8    Total Intake(mL/kg) 2436.1 (37.8) 934.3 (14.5) 437.8 (6.8)    Urine (mL/kg/hr) 1325 (0.9) 955 (0.6) 300 (0.8)    Emesis/NG output  0 (0)     Other  0 (0)     Stool 0 (0) 0 (0)     Total Output 1325 955 300    Net +1111.1 -20.7 +137.8           Urine Occurrence  0 x     Stool Occurrence 0 x 1 x     Emesis Occurrence  1 x           Physical Exam   Constitutional: He is oriented to person, place, and time. He appears well-developed.   Hand and temporal muscle wasting   HENT:   Head: Normocephalic.   Eyes: Pupils  are equal, round, and reactive to light. No scleral icterus.   Cardiovascular: Normal rate, regular rhythm, normal heart sounds and intact distal pulses.    Pulmonary/Chest: Effort normal and breath sounds normal. No respiratory distress. He has no wheezes. He has no rhonchi. He has no rales.   Abdominal: Soft. Bowel sounds are normal. He exhibits distension. There is no tenderness.   Musculoskeletal: Normal range of motion. He exhibits edema (generalized).   Neurological: He is alert and oriented to person, place, and time.   Skin: Skin is warm and dry.   Psychiatric: He has a normal mood and affect. His behavior is normal. Judgment and thought content normal.   Nursing note and vitals reviewed.      Laboratory:  Immunosuppressants         Stop Route Frequency     cycloSPORINE modified capsule 125 mg      -- Oral 2 times daily     cycloSPORINE modified (NEORAL) 25 MG capsule      03/09 1714       cycloSPORINE modified (NEORAL) 100 MG capsule      03/09 1714       cycloSPORINE modified (NEORAL) 100 MG capsule      03/09 0514       cycloSPORINE modified (NEORAL) 25 MG capsule      03/09 0514          CBC:   Recent Labs  Lab 04/06/18  0400   WBC 3.19*   RBC 2.57*   HGB 8.4*   HCT 26.5*   PLT 61*   *   MCH 32.7*   MCHC 31.7*     CMP:   Recent Labs  Lab 04/06/18  0400   GLU 93   CALCIUM 9.4   ALBUMIN 3.0*   PROT 6.1      K 4.5   CO2 26      *   CREATININE 3.9*   ALKPHOS 121   ALT 7*   AST 17   BILITOT 1.1*     Labs within the past 24 hours have been reviewed.    Diagnostic Results:  pertinent imaging reviewed    Assessment/Plan:     Biliary stricture of transplanted liver    - ERCP 12/6 with post-anastomosis stricture with stent placement.  - Tbili with increase prompting ERCP 1/30 with stones and sludge, stents placed.  - Repeat ERCP 2/19 as was having recurrent fevers with bilirubin stuck at 2.0-2.2 with overall unremarkable findings seen. Did place new larger stents.   - ERCP/EUS 3/9 with  metal stent placed in bile duct, stones removed.  - Tbili remains stable.   - Continue actigall.        Nausea and vomiting    - h/o constipation, abdominal pain, and N/V  - worsened with SHANE tube and TF --> now on TPN  - encourage bowel regimen  - EGD performed 1/25 and 3/6, gastroparesis, duodenal ulcer, and congested/erythematous gastric mucosa  - Small bowel through with normal findings  - Continue reglan and marinol scheduled, anti-emetics PRN.   - If cont with GI symptoms then may need to consider Flex sig/ C-scope to further eval.   - Abd xray with distended colon - did not want enema  - D/C Cellcept 3/31 (has met protocol for length of treatment)  - Abd pain stable today, if worsens will perform CT A/P        Severe protein-calorie malnutrition    - Poor PO intake since transplant requiring short course of TPN during previous hospital stay.   - Due to N/V and abdominal pain, multiple attempts were made to place SHANE tube with tube feeding, but patient did not tolerate TF  - Transitioned to TPN 1/16 then again on 2/24 due to poor tolerance of SHANE tube/TF  - prealbumin 7 on 1/15 --> prealbumin 22 on 3/19, monitoring weekly (Mondays)  - EGD performed 1/25 and 3/6, gastroparesis, duodenal ulcer, and congested/erythematous gastric mucosa  - Small bowel through with normal findings.  - Consulted GI for GJ tube placement, but was canceled due to ongoing fevers  - Hold Reglan and Marinol 3/8 for neuro symptoms --> restarted reglan and marinol 3/19, will need to dc marinol before discharge because not covered by insurance.  - set goals for caloric intake with patient, continue nocturnal TPN started 3/20 PM to encourage appetite during the day  - Trying to set small caloric goals daily, for now goal is 300-500 jalen/day. Ultimately needs to consume 700 jalen/day to start weaning TPN.    -met with dietician 4/3/18 to discuss dietary goals.   - hold TPN on 4/6/18 as intake significantly improving.         Constipation    -  chronic  - encourage ambulation, stool softeners and laxatives, often refuses some meds 2/2 nausea, will often require suppository or brown bomb enema to have a BM.  - Small bowel follow through with normal findings.  - Avoid narcotics -need to ween        Liver transplanted    - Post op course complicated by fevers and hyperbilirubinemia, AST/ALT stable.  - see biliary stricture of transplanted liver  - Liver biopsy 1/23 without rejection.  - Paracentesis 3/6 negative for infection, wbc 28 segs 3%.  - ERCP, EUS biopsy 3/9/18 - no rejection, mild cholestatic hepatitis seen.   - T bili normalized        Long-term use of immunosuppressant medication    - Maintenance IS with cyclosporine --> transitioned to Prograf 2/24 for continued nausea but did not tolerate from neuro standpoint.  PT IS NOT A CANDIDATE FOR TACROLIMUS.    - Transitioned back to cyclosporine 3/1  - D/c cyclosporine, started rapa & stress dose steroids 3/10 for continued confusion  - Mental status now improved, re-trial cyclosporine 3/13, d/c rapa 3/13  - Transition back to prednisone taper from stress dose steroids 3/15 --> will decrease by 5 mg q week (decrease to 5 mg 4/6).  - Restarted low dose MMF 3/23- with nausea --> D/C Cellcept 3/31 --> nausea improved --> continue to hold as has passed protocol for Cellcept        At risk for opportunistic infections    - See other cytomegaloviral disease  - Pentam given 3/27  - D/C Isavu 3/21.        Prophylactic immunotherapy    - See long term use of immunosuppression.         Delayed surgical wound healing    - Wd vac removed 1/12/18.    - Wound healing well.   - Changing dressing q Tuesday, last 3/27.        Anemia of chronic disease    - Continue Procrit  - Monitor daily labs. H/H stable, transfuse PRN.         Hyperphosphatemia    - cont renvela. Monitor.         Vitamin D deficiency    - Continue ergo 50K weekly.         Other cytomegaloviral diseases    - detected at 370 on CMV PCR 2/1.  - case  discussed with ID and will hold off on treatment at this time given low WBC.   - undetected CMV PCR 2/8, 2/15, 2/22, 3/8   - continue Valcyte 200 mg every Mon, Wed, Fri.  - Per ID, valcyte dose decreased to ppx dose on 2/18--Valcyte d/c'd 2/25  - Repeat scope 3/6 to assess persistent N/V and possible CMV causing these symptoms. Biopsies thus far normal, cmv stains negative.  - continue to monitor weekly CMV PCR's, last PCR neg from 3/15, recent level from 3/22 undetected.  - CMV 3/29 undetected.        Seizure    - 3/1, patient with stuttering speech, trouble recalling specific words when speaking. Does have hx of seizure (on keppra) previously while on prograf. Switched to prograf over the weekend from cyclosporine. Neuro exam unremarkable.   - CT head without contrast obtained, unremarkable.   - In light of these symptoms, prograf d/c, restart cyclosporine 3/1.  - remains w/o seizure. Cont to have stutter/garbled speech/difficulty finding words.  Worse post EGD as sedation was given.  - MRI 3/6 unremarkable.  - EEG completed 3/8 - mild, generalized, non-specific cerebral dysfunction, no epileptiform activity.  - monitor closely. VSS.        Acute renal failure with tubular necrosis    - HD resumed on previous admission. Anuric on admission and dialyzes M-W-F.  - Nephrology following.    - Goal is for kidney txp in future- currently not a candidate due to health status.  - urine output has improved since 3/16  - HD held 3/21 & 3/23  - Nephrology started torsemide 50 mg daily 3/24  - BUN elevated though on TPN, HD 3/29/18   - Will plan HD twice weekly for now   HD held 4/3, plan for HD 4/6  - Hold torsemide for now (d/roselia on 3/31)            VTE Risk Mitigation         Ordered     heparin (porcine) injection 1,000 Units  As needed (PRN)     Route:  Intra-Catheter        03/12/18 1134     heparin (porcine) injection 5,000 Units  Every 8 hours     Route:  Subcutaneous        03/08/18 1622     Medium Risk of VTE  Once       01/11/18 0351     Place sequential compression device  Until discontinued      01/11/18 0351          The patients clinical status was discussed at multidisplinary rounds, involving transplant surgery, transplant medicine, pharmacy, nursing, nutrition, and social work    Discharge Planning:  Monitor kidney function --> may need outpt HD  Monitor HH needs vs rehab for deconditioned status      Fransisca Thomas PA-C  Liver Transplant  [facility]

## 2018-04-06 NOTE — PROGRESS NOTES
[facility] H[facility]OD[facility] NOTE     Patient currently on hemodialysis for removal of uremic toxins and volume.     Patient seen and evaluated on hemodialysis, tolerating treatment, see HD flowsheet for vitals and assessments.      Ultrafiltration goal is no UF     Labs have been reviewed and the dialysate bath has been adjusted.     Assessment/Plan:  Seen on dialysis this morning tolerating well.  SCR increased again to 3.9 from 3.7, BUN at 104 w/o uremic symptoms.    Will continue trending kidney function.  For now, will provide bi-weekly hemodialysis.      LANE Beard, FNP-BC  Nephrology  Pager:  [phone]

## 2018-04-06 NOTE — ASSESSMENT & PLAN NOTE
- Poor PO intake since transplant requiring short course of TPN during previous hospital stay.   - Due to N/V and abdominal pain, multiple attempts were made to place SHANE tube with tube feeding, but patient did not tolerate TF  - Transitioned to TPN 1/16 then again on 2/24 due to poor tolerance of SHANE tube/TF  - prealbumin 7 on 1/15 --> prealbumin 22 on 3/19, monitoring weekly (Mondays)  - EGD performed 1/25 and 3/6, gastroparesis, duodenal ulcer, and congested/erythematous gastric mucosa  - Small bowel through with normal findings.  - Consulted GI for GJ tube placement, but was canceled due to ongoing fevers  - Hold Reglan and Marinol 3/8 for neuro symptoms --> restarted reglan and marinol 3/19, will need to dc marinol before discharge because not covered by insurance.  - set goals for caloric intake with patient, continue nocturnal TPN started 3/20 PM to encourage appetite during the day  - Trying to set small caloric goals daily, for now goal is 300-500 jalen/day. Ultimately needs to consume 700 jalen/day to start weaning TPN.    -met with dietician 4/3/18 to discuss dietary goals.   - hold TPN on 4/6/18 as intake significantly improving.

## 2018-04-07 LAB
ALBUMIN SERPL BCP-MCNC: 2.8 G/DL
ALP SERPL-CCNC: 139 U/L
ALT SERPL W/O P-5'-P-CCNC: 15 U/L
ANION GAP SERPL CALC-SCNC: 10 MMOL/L
AST SERPL-CCNC: 34 U/L
BACTERIA BLD CULT: NORMAL
BACTERIA BLD CULT: NORMAL
BASOPHILS # BLD AUTO: 0.01 K/UL
BASOPHILS NFR BLD: 0.4 %
BILIRUB SERPL-MCNC: 1.2 MG/DL
BUN SERPL-MCNC: 49 MG/DL
CALCIUM SERPL-MCNC: 9.2 MG/DL
CHLORIDE SERPL-SCNC: 106 MMOL/L
CO2 SERPL-SCNC: 25 MMOL/L
CREAT SERPL-MCNC: 2.5 MG/DL
CYCLOSPORINE BLD LC/MS/MS-MCNC: 177 NG/ML
DIFFERENTIAL METHOD: ABNORMAL
EOSINOPHIL # BLD AUTO: 0.1 K/UL
EOSINOPHIL NFR BLD: 2.8 %
ERYTHROCYTE [DISTWIDTH] IN BLOOD BY AUTOMATED COUNT: 14.9 %
EST. GFR  (AFRICAN AMERICAN): 38.9 ML/MIN/1.73 M^2
EST. GFR  (NON AFRICAN AMERICAN): 33.7 ML/MIN/1.73 M^2
GLUCOSE SERPL-MCNC: 76 MG/DL
HCT VFR BLD AUTO: 26.1 %
HGB BLD-MCNC: 8.4 G/DL
IMM GRANULOCYTES # BLD AUTO: 0.01 K/UL
IMM GRANULOCYTES NFR BLD AUTO: 0.4 %
LYMPHOCYTES # BLD AUTO: 0.9 K/UL
LYMPHOCYTES NFR BLD: 30.2 %
MAGNESIUM SERPL-MCNC: 2.6 MG/DL
MCH RBC QN AUTO: 33.2 PG
MCHC RBC AUTO-ENTMCNC: 32.2 G/DL
MCV RBC AUTO: 103 FL
MONOCYTES # BLD AUTO: 0.4 K/UL
MONOCYTES NFR BLD: 14.9 %
NEUTROPHILS # BLD AUTO: 1.4 K/UL
NEUTROPHILS NFR BLD: 51.3 %
NRBC BLD-RTO: 0 /100 WBC
PHOSPHATE SERPL-MCNC: 5.5 MG/DL
PLATELET # BLD AUTO: 63 K/UL
PMV BLD AUTO: 12.1 FL
POTASSIUM SERPL-SCNC: 4.2 MMOL/L
PROT SERPL-MCNC: 5.8 G/DL
RBC # BLD AUTO: 2.53 M/UL
SODIUM SERPL-SCNC: 141 MMOL/L
WBC # BLD AUTO: 2.81 K/UL

## 2018-04-07 PROCEDURE — 20600001 HC STEP DOWN PRIVATE ROOM

## 2018-04-07 PROCEDURE — 83735 ASSAY OF MAGNESIUM: CPT

## 2018-04-07 PROCEDURE — 25000003 PHARM REV CODE 250

## 2018-04-07 PROCEDURE — 63600175 PHARM REV CODE 636 W HCPCS: Performed by: NURSE PRACTITIONER

## 2018-04-07 PROCEDURE — 25000003 PHARM REV CODE 250: Performed by: NURSE PRACTITIONER

## 2018-04-07 PROCEDURE — 80158 DRUG ASSAY CYCLOSPORINE: CPT

## 2018-04-07 PROCEDURE — 25000003 PHARM REV CODE 250: Performed by: PHYSICIAN ASSISTANT

## 2018-04-07 PROCEDURE — 80074 ACUTE HEPATITIS PANEL: CPT

## 2018-04-07 PROCEDURE — 84100 ASSAY OF PHOSPHORUS: CPT

## 2018-04-07 PROCEDURE — 99233 SBSQ HOSP IP/OBS HIGH 50: CPT | Mod: ,,, | Performed by: PHYSICIAN ASSISTANT

## 2018-04-07 PROCEDURE — 63600175 PHARM REV CODE 636 W HCPCS: Performed by: PHYSICIAN ASSISTANT

## 2018-04-07 PROCEDURE — 80053 COMPREHEN METABOLIC PANEL: CPT

## 2018-04-07 PROCEDURE — 85025 COMPLETE CBC W/AUTO DIFF WBC: CPT

## 2018-04-07 RX ORDER — OXYCODONE HYDROCHLORIDE 5 MG/1
5 TABLET ORAL ONCE
Status: DISCONTINUED | OUTPATIENT
Start: 2018-04-07 | End: 2018-04-07

## 2018-04-07 RX ORDER — OXYCODONE HYDROCHLORIDE 5 MG/1
5 TABLET ORAL ONCE
Status: COMPLETED | OUTPATIENT
Start: 2018-04-07 | End: 2018-04-07

## 2018-04-07 RX ADMIN — OXYCODONE HYDROCHLORIDE 10 MG: 5 TABLET ORAL at 11:04

## 2018-04-07 RX ADMIN — LEVETIRACETAM 500 MG: 100 SOLUTION ORAL at 08:04

## 2018-04-07 RX ADMIN — URSODIOL 300 MG: 300 CAPSULE ORAL at 08:04

## 2018-04-07 RX ADMIN — OXYCODONE HYDROCHLORIDE 10 MG: 5 TABLET ORAL at 07:04

## 2018-04-07 RX ADMIN — CYCLOSPORINE 150 MG: 100 CAPSULE, LIQUID FILLED ORAL at 05:04

## 2018-04-07 RX ADMIN — OXYCODONE HYDROCHLORIDE 5 MG: 5 TABLET ORAL at 11:04

## 2018-04-07 RX ADMIN — DRONABINOL 2.5 MG: 2.5 CAPSULE ORAL at 08:04

## 2018-04-07 RX ADMIN — SEVELAMER CARBONATE 800 MG: 800 TABLET, FILM COATED ORAL at 05:04

## 2018-04-07 RX ADMIN — PREDNISONE 10 MG: 10 TABLET ORAL at 08:04

## 2018-04-07 RX ADMIN — LEVOTHYROXINE SODIUM 75 MCG: 75 TABLET ORAL at 06:04

## 2018-04-07 RX ADMIN — OXYCODONE HYDROCHLORIDE 10 MG: 5 TABLET ORAL at 08:04

## 2018-04-07 RX ADMIN — METOCLOPRAMIDE HYDROCHLORIDE 10 MG: 5 SOLUTION ORAL at 06:04

## 2018-04-07 RX ADMIN — DOCUSATE SODIUM 100 MG: 100 CAPSULE, LIQUID FILLED ORAL at 08:04

## 2018-04-07 RX ADMIN — OMEPRAZOLE 40 MG: 40 CAPSULE, DELAYED RELEASE ORAL at 07:04

## 2018-04-07 RX ADMIN — SEVELAMER CARBONATE 800 MG: 800 TABLET, FILM COATED ORAL at 11:04

## 2018-04-07 RX ADMIN — PROCHLORPERAZINE EDISYLATE 10 MG: 5 INJECTION INTRAMUSCULAR; INTRAVENOUS at 11:04

## 2018-04-07 RX ADMIN — SEVELAMER CARBONATE 800 MG: 800 TABLET, FILM COATED ORAL at 07:04

## 2018-04-07 RX ADMIN — OXYCODONE HYDROCHLORIDE 10 MG: 5 TABLET ORAL at 03:04

## 2018-04-07 RX ADMIN — CYCLOSPORINE 150 MG: 100 CAPSULE, LIQUID FILLED ORAL at 07:04

## 2018-04-07 RX ADMIN — BISACODYL 10 MG: 5 TABLET, COATED ORAL at 08:04

## 2018-04-07 RX ADMIN — METOCLOPRAMIDE HYDROCHLORIDE 10 MG: 5 SOLUTION ORAL at 11:04

## 2018-04-07 NOTE — PROGRESS NOTES
Notified Citlali that patient is stating he threw up the 10 mg of oxycodone just given. Can see portions of white pills in bag, but difficult as it is blue. She stated it is okay to give him another 5 mg of oxycodone now.

## 2018-04-07 NOTE — PLAN OF CARE
Patient's VSS for shift. Patient did not eat breakfast. Asked for pain medication around 11:30, then asked for compazine because he stated he had thrown up pain medication. Patient has denied nausea since this incident. Patient remains AAOx4, calm, cooperative, and independent. Has urinated 500 ml this shift. Walked off floor x2 with mother to get exercise. States stomach just does not feel well today. Did eat a chili dog for dinner. Free from falls, injury, and skin breakdown.

## 2018-04-07 NOTE — SUBJECTIVE & OBJECTIVE
Scheduled Meds:   sodium chloride 0.9%   Intravenous Once    sodium chloride 0.9%   Intravenous Once    sodium chloride 0.9%   Intravenous Once    bisacodyl  10 mg Oral Daily    cycloSPORINE modified  150 mg Oral BID    docusate sodium  100 mg Oral BID    dronabinol  2.5 mg Oral QHS    epoetin maurisio (PROCRIT) injection  12,600 Units Intravenous Every Mon, Wed, Fri    ergocalciferol  50,000 Units Oral Q7 Days    heparin (porcine)  5,000 Units Subcutaneous Q8H    levetiracetam oral soln  500 mg Oral BID    levothyroxine  75 mcg Oral Before breakfast    metoclopramide HCl  10 mg Oral QID (AC & HS)    omeprazole  40 mg Oral QAM    predniSONE  10 mg Oral Daily    Followed by    [START ON 4/8/2018] predniSONE  5 mg Oral Daily    sevelamer carbonate  800 mg Oral TID WM    ursodiol  300 mg Oral BID     Continuous Infusions:  PRN Meds:sodium chloride 0.9%, acetaminophen, albuterol-ipratropium 2.5mg-0.5mg/3mL, bisacodyl, butalbital-acetaminophen-caffeine -40 mg, dextrose 50%, dextrose 50%, diphenhydrAMINE-zinc acetate 1-0.1%, glucagon (human recombinant), glucose, glucose, heparin (porcine), naloxone, ondansetron, ondansetron, oxyCODONE, oxyCODONE, prochlorperazine, prochlorperazine, simethicone, sodium chloride 0.9%, sodium chloride 0.9%    Review of Systems   Constitutional: Positive for activity change, appetite change and fatigue. Negative for chills and fever.   HENT: Negative for congestion and facial swelling.    Eyes: Negative for pain, discharge and visual disturbance.   Respiratory: Negative.  Negative for cough, chest tightness, shortness of breath and wheezing.    Cardiovascular: Negative.  Negative for chest pain, palpitations and leg swelling.   Gastrointestinal: Positive for abdominal distention and abdominal pain. Negative for constipation, diarrhea, nausea and vomiting.   Endocrine: Negative.    Genitourinary: Negative for decreased urine volume, difficulty urinating, dysuria, hematuria  and urgency.   Musculoskeletal: Negative for back pain, gait problem, neck pain and neck stiffness.   Skin: Negative for color change and pallor.   Allergic/Immunologic: Positive for immunocompromised state.   Neurological: Negative for dizziness, seizures, weakness, light-headedness and headaches.   Psychiatric/Behavioral: Negative for behavioral problems, confusion, dysphoric mood, hallucinations, sleep disturbance and suicidal ideas. The patient is not nervous/anxious.      Objective:     Vital Signs (Most Recent):  Temp: 99.6 °F (37.6 °C) (04/07/18 1102)  Pulse: 82 (04/07/18 1102)  Resp: 18 (04/07/18 1102)  BP: 136/81 (04/07/18 1102)  SpO2: 96 % (04/07/18 1102) Vital Signs (24h Range):  Temp:  [98 °F (36.7 °C)-99.6 °F (37.6 °C)] 99.6 °F (37.6 °C)  Pulse:  [75-82] 82  Resp:  [15-18] 18  SpO2:  [95 %-100 %] 96 %  BP: (116-140)/(69-87) 136/81     Weight: 64.4 kg (141 lb 15.6 oz)  Body mass index is 22.93 kg/m².    Intake/Output - Last 3 Shifts       04/05 0700 - 04/06 0659 04/06 0700 - 04/07 0659 04/07 0700 - 04/08 0659    P.O. 799 1100     Other  500     .3 337.8     Total Intake(mL/kg) 934.3 (14.5) 1937.8 (30.1)     Urine (mL/kg/hr) 955 (0.6) 1410 (0.9) 225 (0.8)    Emesis/NG output 0 (0)      Other 0 (0) 500 (0.3)     Stool 0 (0) 0 (0)     Total Output 955 1910 225    Net -20.7 +27.8 -225           Urine Occurrence 0 x      Stool Occurrence 1 x 0 x     Emesis Occurrence 1 x            Physical Exam   Constitutional: He is oriented to person, place, and time. He appears well-developed.   Hand and temporal muscle wasting   HENT:   Head: Normocephalic.   Eyes: Pupils are equal, round, and reactive to light. No scleral icterus.   Cardiovascular: Normal rate, regular rhythm, normal heart sounds and intact distal pulses.    Pulmonary/Chest: Effort normal and breath sounds normal. No respiratory distress. He has no wheezes. He has no rhonchi. He has no rales.   Abdominal: Soft. Bowel sounds are normal. He  exhibits distension. There is no tenderness.   Musculoskeletal: Normal range of motion. He exhibits edema (generalized).   Neurological: He is alert and oriented to person, place, and time.   Skin: Skin is warm and dry.   Psychiatric: He has a normal mood and affect. His behavior is normal. Judgment and thought content normal.   Nursing note and vitals reviewed.      Laboratory:  Immunosuppressants         Stop Route Frequency     cycloSPORINE modified capsule 150 mg      -- Oral 2 times daily     cycloSPORINE modified (NEORAL) 25 MG capsule      03/09 1714       cycloSPORINE modified (NEORAL) 100 MG capsule      03/09 1714       cycloSPORINE modified (NEORAL) 100 MG capsule      03/09 0514       cycloSPORINE modified (NEORAL) 25 MG capsule      03/09 0514          CBC:     Recent Labs  Lab 04/07/18  0400   WBC 2.81*   RBC 2.53*   HGB 8.4*   HCT 26.1*   PLT 63*   *   MCH 33.2*   MCHC 32.2     CMP:     Recent Labs  Lab 04/07/18  0400   GLU 76   CALCIUM 9.2   ALBUMIN 2.8*   PROT 5.8*      K 4.2   CO2 25      BUN 49*   CREATININE 2.5*   ALKPHOS 139*   ALT 15   AST 34   BILITOT 1.2*     Labs within the past 24 hours have been reviewed.    Diagnostic Results:  pertinent imaging reviewed

## 2018-04-07 NOTE — PLAN OF CARE
Problem: Patient Care Overview  Goal: Plan of Care Review  Outcome: Ongoing (interventions implemented as appropriate)    Pt AAOx4 with mother at the bedside.  Pt mother continues to be attentive to and supportive of pt and actively involved in pt care.    L IJ TLC CDI. Dressing reinforced. Central line just saline locked as TPN & lipids have been D/C'd.   Pt motivated to increase oral intake so that he can get out of hospital.   Pt keeping log of dietary intake on sheet of paper in his room. Continuing Reglan and Marinol.      R SC HD cath CDI.  Pt went to HD yesterday for clearance. 0 ml taken off over 3 hrs.   Plan for HD 2x/week until kidneys starts clearing.     Pt with only 100 mL in urine output thus far tonight.  No BM    Abdominal pain is stable. Pt not requiring IV pain medication and has cut back on frequency of PO pain meds. Pt has c/o 7/10 lower abdominal pain. Pt has received Oxy 10 mg once and 5 mg Oxy for 7-10/10 pain not relieved by 10 mg oxy at bedtime.     Pt remains afebrile off abx. Tmax 98.5  HR 70's  's/70's  Satting % on room air.     Pt refused heparin shots.  Pt ambulated in halls accompanied by his mother.     Pt remained free from falls or injury thus far.   Bed is in low/ locked position, side rails are up x 2, call light is in reach.   Will continue to monitor.

## 2018-04-07 NOTE — PROGRESS NOTES
[facility]  Liver Transplant  Progress Note    Patient Name: Jhonny Diana  MRN: [id number]  Admission Date: 2018  Hospital Length of Stay: 86 days  Code Status: Full Code  Primary Care Provider: Primary Doctor No  Post-Operative Day: 170    ORGAN:   LIVER  Disease Etiology: Acute Alcoholic Hepatitis  Donor Type:    - Brain Death  CDC High Risk:   No  Donor CMV Status:   Donor CMV Status: Positive  Donor HBcAB:   Negative  Donor HCV Status:   Negative  Whole or Partial: Whole Liver  Biliary Anastomosis: End to End  Arterial Anatomy: Standard  Subjective:     History of Present Illness:  Jhonny Diana is a 26 y/o male with past medical history of alcoholic cirrhosis.  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 Found to have peritonitis in November (WBC 5000, 75% PNM) neg for bile leak. He was treated initially with vanc/cefepime. Repeat cell counts  with some improvement (WBC 1400, 45% PNM). He has undergone multiple paracenteses as well as abscess drainage of perihepatic fluid collections and treated with antimicrobial therapy but no positive cultures. Of note, biliary stricture also identified and ERCP performed on 2017 with sphincterotomy and biliary stent placed. Liver tests still have not normalized despite intervention, bilirubin and AP remain elevated. Other pertinent PMH current wound vac in place 2/2 wound infection, malnutrition requiring TPN for short course and ongoing hypoalbuminemia, and seizure activity while on prograf and has since been switched to cyclosporine without reoccurrence.  He presented to the ER for fever, abdominal pain, and N/V. He reports fever (103) for 1  day prior. Overnight, he developed N/V, reports small amount of green emesis with new left sided pain. He also endorses worsening SOB with exertion. He was scheduled as an outpatient for follow up paracentesis and IR drainage of fluid collection. CXR in ER shows large pleural effusion with subsegmental atelectasis. Infectious work up initiated in ER. His ANC is 900. Broad spectrum antibiotics initiated in ED. At admit, he denied chest pain, palpitations, diarrhea, constipation, back pain, or any sick contacts.    Hospital Course:  Unclear cause of ongoing fevers. Multiple infectious, diagnostic testing completed. Taken to OR for ex lap 2/6, loculated fluid collections found, cultures NGTD. Multiple blood cultures with NGTD. Thoracentesis 1/11, 1/18, 1/31, and 2/16, cultures NGTD. Paracentesis 1/11, 1/19, and 2/5, cultures NGTD. negative fungal markers/quant gold intermediate. Negative TTE.  IR drainage 1/11, cultures NGTD. Continued with intermittent fevers despite broad spectrum antibiotics that were initially started 1/11, then broadened antibiotics 2/22 from vanc/zosyn to ertapenem. Multiple CT scan of abd obtained that were unremarkable. Tagged WBCs scan 2/27 without source of infection found. He had several episodes of neutropenia requiring neupogen, last given 2/28. 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 Monitoring weekly CMV PCR. Chimerism studies obtained 3/1 to assess for GVHD, which was negative, EBV PCR negative, adenovirus negative. In addition to fevers, he has consistently c/o abdominal pain and N/V. Tbili has remain elevated post-op requiring multiple ERCPs. ERCPs performed this admission on 1/30, 2/19, and 3/9. ERCPs with sludge and stones with stents placed. Last ERCP 3/9 with metal stent placed into CBD and stones removed. His ertapenem was d/cd 3/7 due to AMS. Following ERCP 3/9, he was started on cefepime and flagyl  for possible cholangitis. He has remained afebrile since 3/10. He completed cefepime/flagyl after 3 weeks on 3/19 & 3/20. Due to elevated enzymes, he had a liver biopsy 1/23 consistent with cholestatic hepatitis (no cultures obtained during biopsy). GI was consulted for persistent N/V and malnutrition. EGD performed 1/25 and 3/6, EGD 3/6 with normal esophagus congested, erythematous and nodular mucosa in the stomach, one duodenal ulcer with a clean ulcer base (Emir Class III), biopsies taken, negative for CMV and GVHD. Due to N/V and abdominal pain, multiple attempts were made to place SHANE tube with tube feeding, but patient did not tolerate TF. Therefore, he was transitioned to TPN 1/16 then again on 2/24. He also has chronic constipation. Small bowel follow through study with normal findings. Following EGD 1/25, he was started on a PPI, miralax, and Metamucil, but he did not tolerate meds. For appetite stimulation he was trialed on marinol. He also did well with reglan before meals and at bedtime. Early post-op liver transplant surgery, he was switched to cyclosporine for seizures. During this admission, he was transitioned back to prograf 2/24 with keppra on board. Starting 3/1 he developed stuttering speech, disorientation. CT head 3/1 and MRI head 3/6 unremarkable. EEG 3/8 with no seizure activity. He started on rapamune and stress dose steroids 3/10, then ultimately returned to cyclosporine 3/13 and rapamune d/cd same day. Since transitioning back to cyclosporine, his mental status has remained stable. During admission, patient was evaluated by [facility] for future kidney transplant, and he was deemed to be a suitable candidate once medically stable and cleared. He dialyzes on a M-W-F schedule. He was anuric on admission, but starting producing urine around 3/16.  Now producing >2-3L per day.       Interval History: No acute events overnight. Patient overall feeling well today. Ate well yesterday. Off TPN. No  nausea/vomiting. Had HD yesterday 4/6 tolerated well. Encourage ambulation and po intake. Cont to monitor.     Scheduled Meds:   sodium chloride 0.9%   Intravenous Once    sodium chloride 0.9%   Intravenous Once    sodium chloride 0.9%   Intravenous Once    bisacodyl  10 mg Oral Daily    cycloSPORINE modified  150 mg Oral BID    docusate sodium  100 mg Oral BID    dronabinol  2.5 mg Oral QHS    epoetin maurisio (PROCRIT) injection  12,600 Units Intravenous Every Mon, Wed, Fri    ergocalciferol  50,000 Units Oral Q7 Days    heparin (porcine)  5,000 Units Subcutaneous Q8H    levetiracetam oral soln  500 mg Oral BID    levothyroxine  75 mcg Oral Before breakfast    metoclopramide HCl  10 mg Oral QID (AC & HS)    omeprazole  40 mg Oral QAM    predniSONE  10 mg Oral Daily    Followed by    [START ON 4/8/2018] predniSONE  5 mg Oral Daily    sevelamer carbonate  800 mg Oral TID WM    ursodiol  300 mg Oral BID     Continuous Infusions:  PRN Meds:sodium chloride 0.9%, acetaminophen, albuterol-ipratropium 2.5mg-0.5mg/3mL, bisacodyl, butalbital-acetaminophen-caffeine -40 mg, dextrose 50%, dextrose 50%, diphenhydrAMINE-zinc acetate 1-0.1%, glucagon (human recombinant), glucose, glucose, heparin (porcine), naloxone, ondansetron, ondansetron, oxyCODONE, oxyCODONE, prochlorperazine, prochlorperazine, simethicone, sodium chloride 0.9%, sodium chloride 0.9%    Review of Systems   Constitutional: Positive for activity change, appetite change and fatigue. Negative for chills and fever.   HENT: Negative for congestion and facial swelling.    Eyes: Negative for pain, discharge and visual disturbance.   Respiratory: Negative.  Negative for cough, chest tightness, shortness of breath and wheezing.    Cardiovascular: Negative.  Negative for chest pain, palpitations and leg swelling.   Gastrointestinal: Positive for abdominal distention and abdominal pain. Negative for constipation, diarrhea, nausea and vomiting.    Endocrine: Negative.    Genitourinary: Negative for decreased urine volume, difficulty urinating, dysuria, hematuria and urgency.   Musculoskeletal: Negative for back pain, gait problem, neck pain and neck stiffness.   Skin: Negative for color change and pallor.   Allergic/Immunologic: Positive for immunocompromised state.   Neurological: Negative for dizziness, seizures, weakness, light-headedness and headaches.   Psychiatric/Behavioral: Negative for behavioral problems, confusion, dysphoric mood, hallucinations, sleep disturbance and suicidal ideas. The patient is not nervous/anxious.      Objective:     Vital Signs (Most Recent):  Temp: 99.6 °F (37.6 °C) (04/07/18 1102)  Pulse: 82 (04/07/18 1102)  Resp: 18 (04/07/18 1102)  BP: 136/81 (04/07/18 1102)  SpO2: 96 % (04/07/18 1102) Vital Signs (24h Range):  Temp:  [98 °F (36.7 °C)-99.6 °F (37.6 °C)] 99.6 °F (37.6 °C)  Pulse:  [75-82] 82  Resp:  [15-18] 18  SpO2:  [95 %-100 %] 96 %  BP: (116-140)/(69-87) 136/81     Weight: 64.4 kg (141 lb 15.6 oz)  Body mass index is 22.93 kg/m².    Intake/Output - Last 3 Shifts       04/05 0700 - 04/06 0659 04/06 0700 - 04/07 0659 04/07 0700 - 04/08 0659    P.O. 799 1100     Other  500     .3 337.8     Total Intake(mL/kg) 934.3 (14.5) 1937.8 (30.1)     Urine (mL/kg/hr) 955 (0.6) 1410 (0.9) 225 (0.8)    Emesis/NG output 0 (0)      Other 0 (0) 500 (0.3)     Stool 0 (0) 0 (0)     Total Output 955 1910 225    Net -20.7 +27.8 -225           Urine Occurrence 0 x      Stool Occurrence 1 x 0 x     Emesis Occurrence 1 x            Physical Exam   Constitutional: He is oriented to person, place, and time. He appears well-developed.   Hand and temporal muscle wasting   HENT:   Head: Normocephalic.   Eyes: Pupils are equal, round, and reactive to light. No scleral icterus.   Cardiovascular: Normal rate, regular rhythm, normal heart sounds and intact distal pulses.    Pulmonary/Chest: Effort normal and breath sounds normal. No respiratory  distress. He has no wheezes. He has no rhonchi. He has no rales.   Abdominal: Soft. Bowel sounds are normal. He exhibits distension. There is no tenderness.   Musculoskeletal: Normal range of motion. He exhibits edema (generalized).   Neurological: He is alert and oriented to person, place, and time.   Skin: Skin is warm and dry.   Psychiatric: He has a normal mood and affect. His behavior is normal. Judgment and thought content normal.   Nursing note and vitals reviewed.      Laboratory:  Immunosuppressants         Stop Route Frequency     cycloSPORINE modified capsule 150 mg      -- Oral 2 times daily     cycloSPORINE modified (NEORAL) 25 MG capsule      03/09 1714       cycloSPORINE modified (NEORAL) 100 MG capsule      03/09 1714       cycloSPORINE modified (NEORAL) 100 MG capsule      03/09 0514       cycloSPORINE modified (NEORAL) 25 MG capsule      03/09 0514          CBC:     Recent Labs  Lab 04/07/18  0400   WBC 2.81*   RBC 2.53*   HGB 8.4*   HCT 26.1*   PLT 63*   *   MCH 33.2*   MCHC 32.2     CMP:     Recent Labs  Lab 04/07/18  0400   GLU 76   CALCIUM 9.2   ALBUMIN 2.8*   PROT 5.8*      K 4.2   CO2 25      BUN 49*   CREATININE 2.5*   ALKPHOS 139*   ALT 15   AST 34   BILITOT 1.2*     Labs within the past 24 hours have been reviewed.    Diagnostic Results:  pertinent imaging reviewed    Assessment/Plan:     Hyperphosphatemia    - cont renvela. Monitor.         Vitamin D deficiency    - Continue ergo 50K weekly.         Other cytomegaloviral diseases    - detected at 370 on CMV PCR 2/1.  - case discussed with ID and will hold off on treatment at this time given low WBC.   - undetected CMV PCR 2/8, 2/15, 2/22, 3/8   - continue Valcyte 200 mg every Mon, Wed, Fri.  - Per ID, valcyte dose decreased to ppx dose on 2/18--Valcyte d/c'd 2/25  - Repeat scope 3/6 to assess persistent N/V and possible CMV causing these symptoms. Biopsies thus far normal, cmv stains negative.  - continue to monitor  weekly CMV PCR's, last PCR neg from 3/15, recent level from 3/22 undetected.  - CMV 3/29 undetected.        Constipation    - chronic  - encourage ambulation, stool softeners and laxatives, often refuses some meds 2/2 nausea, will often require suppository or brown bomb enema to have a BM.  - Small bowel follow through with normal findings.  - Avoid narcotics -need to ween        Severe protein-calorie malnutrition    - Poor PO intake since transplant requiring short course of TPN during previous hospital stay.   - Due to N/V and abdominal pain, multiple attempts were made to place SHANE tube with tube feeding, but patient did not tolerate TF  - Transitioned to TPN 1/16 then again on 2/24 due to poor tolerance of SHANE tube/TF  - prealbumin 7 on 1/15 --> prealbumin 22 on 3/19, monitoring weekly (Mondays)  - EGD performed 1/25 and 3/6, gastroparesis, duodenal ulcer, and congested/erythematous gastric mucosa  - Small bowel through with normal findings.  - Consulted GI for GJ tube placement, but was canceled due to ongoing fevers  - Hold Reglan and Marinol 3/8 for neuro symptoms --> restarted reglan and marinol 3/19, will need to dc marinol before discharge because not covered by insurance.  - set goals for caloric intake with patient, continue nocturnal TPN started 3/20 PM to encourage appetite during the day  - Trying to set small caloric goals daily, for now goal is 300-500 jalen/day. Ultimately needs to consume 700 jalen/day to start weaning TPN.    -met with dietician 4/3/18 to discuss dietary goals.   - hold TPN on 4/6/18 as intake significantly improving.         Nausea and vomiting    - h/o constipation, abdominal pain, and N/V  - worsened with SHANE tube and TF --> now on TPN  - encourage bowel regimen  - EGD performed 1/25 and 3/6, gastroparesis, duodenal ulcer, and congested/erythematous gastric mucosa  - Small bowel through with normal findings  - Continue reglan and marinol scheduled, anti-emetics PRN.   - If cont  with GI symptoms then may need to consider Flex sig/ C-scope to further eval.   - Abd xray with distended colon - did not want enema  - D/C Cellcept 3/31 (has met protocol for length of treatment)  - Abd pain stable today, if worsens will perform CT A/P        Biliary stricture of transplanted liver    - ERCP 12/6 with post-anastomosis stricture with stent placement.  - Tbili with increase prompting ERCP 1/30 with stones and sludge, stents placed.  - Repeat ERCP 2/19 as was having recurrent fevers with bilirubin stuck at 2.0-2.2 with overall unremarkable findings seen. Did place new larger stents.   - ERCP/EUS 3/9 with metal stent placed in bile duct, stones removed.  - Tbili remains stable.   - Continue actigall.        Anemia of chronic disease    - Continue Procrit  - Monitor daily labs. H/H stable, transfuse PRN.         Delayed surgical wound healing    - Wd vac removed 1/12/18.    - Wound healing well.   - Changing dressing q Tuesday, last 3/27.        Seizure    - 3/1, patient with stuttering speech, trouble recalling specific words when speaking. Does have hx of seizure (on keppra) previously while on prograf. Switched to prograf over the weekend from cyclosporine. Neuro exam unremarkable.   - CT head without contrast obtained, unremarkable.   - In light of these symptoms, prograf d/c, restart cyclosporine 3/1.  - remains w/o seizure. Cont to have stutter/garbled speech/difficulty finding words.  Worse post EGD as sedation was given.  - MRI 3/6 unremarkable.  - EEG completed 3/8 - mild, generalized, non-specific cerebral dysfunction, no epileptiform activity.  - monitor closely. VSS.        At risk for opportunistic infections    - See other cytomegaloviral disease  - Pentam given 3/27  - D/C Isavu 3/21.        Long-term use of immunosuppressant medication    - Maintenance IS with cyclosporine --> transitioned to Prograf 2/24 for continued nausea but did not tolerate from neuro standpoint.  PT IS NOT A  CANDIDATE FOR TACROLIMUS.    - Transitioned back to cyclosporine 3/1  - D/c cyclosporine, started rapa & stress dose steroids 3/10 for continued confusion  - Mental status now improved, re-trial cyclosporine 3/13, d/c rapa 3/13  - Transition back to prednisone taper from stress dose steroids 3/15 --> will decrease by 5 mg q week (decrease to 5 mg 4/6).  - Restarted low dose MMF 3/23- with nausea --> D/C Cellcept 3/31 --> nausea improved --> continue to hold as has passed protocol for Cellcept        Prophylactic immunotherapy    - See long term use of immunosuppression.         Liver transplanted    - Post op course complicated by fevers and hyperbilirubinemia, AST/ALT stable.  - see biliary stricture of transplanted liver  - Liver biopsy 1/23 without rejection.  - Paracentesis 3/6 negative for infection, wbc 28 segs 3%.  - ERCP, EUS biopsy 3/9/18 - no rejection, mild cholestatic hepatitis seen.   - T bili normalized        Acute renal failure with tubular necrosis    - HD resumed on previous admission. Anuric on admission and dialyzes M-W-F.  - Nephrology following.    - Goal is for kidney txp in future- currently not a candidate due to health status.  - urine output has improved since 3/16  - HD held 3/21 & 3/23  - Nephrology started torsemide 50 mg daily 3/24  - BUN elevated though on TPN, HD 3/29/18   - Will plan HD twice weekly for now   HD held 4/3, had on HD 4/6  - Hold torsemide for now (d/roselia on 3/31)            VTE Risk Mitigation         Ordered     heparin (porcine) injection 1,000 Units  As needed (PRN)     Route:  Intra-Catheter        03/12/18 1134     heparin (porcine) injection 5,000 Units  Every 8 hours     Route:  Subcutaneous        03/08/18 1622     Medium Risk of VTE  Once      01/11/18 0351     Place sequential compression device  Until discontinued      01/11/18 0351          The patients clinical status was discussed at multidisplinary rounds, involving transplant surgery, transplant  medicine, pharmacy, nursing, nutrition, and social work    Discharge Planning:  Monitor kidney function --> may need outpt HD  Monitor HH needs vs rehab for deconditioned status      Citlali Bridges PA-C  Liver Transplant  [facility]

## 2018-04-08 LAB
ALBUMIN SERPL BCP-MCNC: 2.7 G/DL
ALP SERPL-CCNC: 143 U/L
ALT SERPL W/O P-5'-P-CCNC: 13 U/L
ANION GAP SERPL CALC-SCNC: 11 MMOL/L
AST SERPL-CCNC: 24 U/L
BASOPHILS # BLD AUTO: 0.02 K/UL
BASOPHILS NFR BLD: 0.6 %
BILIRUB SERPL-MCNC: 1.3 MG/DL
BUN SERPL-MCNC: 57 MG/DL
CALCIUM SERPL-MCNC: 9.2 MG/DL
CHLORIDE SERPL-SCNC: 106 MMOL/L
CO2 SERPL-SCNC: 22 MMOL/L
CREAT SERPL-MCNC: 2.9 MG/DL
CYCLOSPORINE BLD LC/MS/MS-MCNC: 222 NG/ML
DIFFERENTIAL METHOD: ABNORMAL
EOSINOPHIL # BLD AUTO: 0.1 K/UL
EOSINOPHIL NFR BLD: 2 %
ERYTHROCYTE [DISTWIDTH] IN BLOOD BY AUTOMATED COUNT: 14.6 %
EST. GFR  (AFRICAN AMERICAN): 32.5 ML/MIN/1.73 M^2
EST. GFR  (NON AFRICAN AMERICAN): 28.1 ML/MIN/1.73 M^2
GLUCOSE SERPL-MCNC: 82 MG/DL
HCT VFR BLD AUTO: 26.3 %
HGB BLD-MCNC: 8.5 G/DL
IMM GRANULOCYTES # BLD AUTO: 0.01 K/UL
IMM GRANULOCYTES NFR BLD AUTO: 0.3 %
LYMPHOCYTES # BLD AUTO: 0.9 K/UL
LYMPHOCYTES NFR BLD: 26.1 %
MAGNESIUM SERPL-MCNC: 2.2 MG/DL
MCH RBC QN AUTO: 32.9 PG
MCHC RBC AUTO-ENTMCNC: 32.3 G/DL
MCV RBC AUTO: 102 FL
MONOCYTES # BLD AUTO: 0.5 K/UL
MONOCYTES NFR BLD: 15.7 %
NEUTROPHILS # BLD AUTO: 1.9 K/UL
NEUTROPHILS NFR BLD: 55.3 %
NRBC BLD-RTO: 0 /100 WBC
PHOSPHATE SERPL-MCNC: 6.2 MG/DL
PLATELET # BLD AUTO: 59 K/UL
PMV BLD AUTO: 12.1 FL
POTASSIUM SERPL-SCNC: 4.5 MMOL/L
PROT SERPL-MCNC: 5.7 G/DL
RBC # BLD AUTO: 2.58 M/UL
SODIUM SERPL-SCNC: 139 MMOL/L
WBC # BLD AUTO: 3.45 K/UL

## 2018-04-08 PROCEDURE — 25000003 PHARM REV CODE 250: Performed by: NURSE PRACTITIONER

## 2018-04-08 PROCEDURE — 20600001 HC STEP DOWN PRIVATE ROOM

## 2018-04-08 PROCEDURE — 80053 COMPREHEN METABOLIC PANEL: CPT

## 2018-04-08 PROCEDURE — 85025 COMPLETE CBC W/AUTO DIFF WBC: CPT

## 2018-04-08 PROCEDURE — 25000003 PHARM REV CODE 250: Performed by: PHYSICIAN ASSISTANT

## 2018-04-08 PROCEDURE — 25000003 PHARM REV CODE 250

## 2018-04-08 PROCEDURE — 63600175 PHARM REV CODE 636 W HCPCS: Performed by: PHYSICIAN ASSISTANT

## 2018-04-08 PROCEDURE — 94799 UNLISTED PULMONARY SVC/PX: CPT

## 2018-04-08 PROCEDURE — 99900035 HC TECH TIME PER 15 MIN (STAT)

## 2018-04-08 PROCEDURE — 83735 ASSAY OF MAGNESIUM: CPT

## 2018-04-08 PROCEDURE — 80158 DRUG ASSAY CYCLOSPORINE: CPT

## 2018-04-08 PROCEDURE — 63600175 PHARM REV CODE 636 W HCPCS: Performed by: NURSE PRACTITIONER

## 2018-04-08 PROCEDURE — 99233 SBSQ HOSP IP/OBS HIGH 50: CPT | Mod: ,,, | Performed by: PHYSICIAN ASSISTANT

## 2018-04-08 PROCEDURE — 84100 ASSAY OF PHOSPHORUS: CPT

## 2018-04-08 PROCEDURE — 94664 DEMO&/EVAL PT USE INHALER: CPT

## 2018-04-08 PROCEDURE — 94761 N-INVAS EAR/PLS OXIMETRY MLT: CPT

## 2018-04-08 RX ORDER — SODIUM CHLORIDE 9 MG/ML
INJECTION, SOLUTION INTRAVENOUS ONCE
Status: COMPLETED | OUTPATIENT
Start: 2018-04-09 | End: 2018-04-09

## 2018-04-08 RX ORDER — SODIUM CHLORIDE 9 MG/ML
INJECTION, SOLUTION INTRAVENOUS
Status: DISCONTINUED | OUTPATIENT
Start: 2018-04-09 | End: 2018-04-10 | Stop reason: HOSPADM

## 2018-04-08 RX ADMIN — OXYCODONE HYDROCHLORIDE 10 MG: 5 TABLET ORAL at 07:04

## 2018-04-08 RX ADMIN — OXYCODONE HYDROCHLORIDE 10 MG: 5 TABLET ORAL at 11:04

## 2018-04-08 RX ADMIN — OXYCODONE HYDROCHLORIDE 10 MG: 5 TABLET ORAL at 03:04

## 2018-04-08 RX ADMIN — BISACODYL 10 MG: 5 TABLET, COATED ORAL at 08:04

## 2018-04-08 RX ADMIN — LEVETIRACETAM 500 MG: 100 SOLUTION ORAL at 08:04

## 2018-04-08 RX ADMIN — LEVOTHYROXINE SODIUM 75 MCG: 75 TABLET ORAL at 05:04

## 2018-04-08 RX ADMIN — URSODIOL 300 MG: 300 CAPSULE ORAL at 08:04

## 2018-04-08 RX ADMIN — SEVELAMER CARBONATE 800 MG: 800 TABLET, FILM COATED ORAL at 07:04

## 2018-04-08 RX ADMIN — DOCUSATE SODIUM 100 MG: 100 CAPSULE, LIQUID FILLED ORAL at 08:04

## 2018-04-08 RX ADMIN — METOCLOPRAMIDE HYDROCHLORIDE 10 MG: 5 SOLUTION ORAL at 08:04

## 2018-04-08 RX ADMIN — SEVELAMER CARBONATE 800 MG: 800 TABLET, FILM COATED ORAL at 05:04

## 2018-04-08 RX ADMIN — PROCHLORPERAZINE EDISYLATE 10 MG: 5 INJECTION INTRAMUSCULAR; INTRAVENOUS at 10:04

## 2018-04-08 RX ADMIN — CYCLOSPORINE 150 MG: 100 CAPSULE, LIQUID FILLED ORAL at 07:04

## 2018-04-08 RX ADMIN — DRONABINOL 2.5 MG: 2.5 CAPSULE ORAL at 08:04

## 2018-04-08 RX ADMIN — OMEPRAZOLE 40 MG: 40 CAPSULE, DELAYED RELEASE ORAL at 08:04

## 2018-04-08 RX ADMIN — PREDNISONE 5 MG: 5 TABLET ORAL at 08:04

## 2018-04-08 RX ADMIN — METOCLOPRAMIDE HYDROCHLORIDE 10 MG: 5 SOLUTION ORAL at 05:04

## 2018-04-08 RX ADMIN — CYCLOSPORINE 150 MG: 100 CAPSULE, LIQUID FILLED ORAL at 05:04

## 2018-04-08 NOTE — PLAN OF CARE
Problem: Patient Care Overview  Goal: Plan of Care Review  Outcome: Ongoing (interventions implemented as appropriate)  Pt AAOx4. Pt free from falls. Pt wears non slip socks when ambulating. Pt bed low and locked position. Pt afebrile. Pt IV site without redness or edema. Educated pt on importance of hand washing. Pt receives PRN pain medication q 4 hrs.

## 2018-04-08 NOTE — PROGRESS NOTES
Pt called for pain medication at 0052. Entered pt room at 0105 Pt is resting well eyes closed respirations even and unlabored. Will return medication and continue to monitor.

## 2018-04-08 NOTE — SUBJECTIVE & OBJECTIVE
Scheduled Meds:   sodium chloride 0.9%   Intravenous Once    sodium chloride 0.9%   Intravenous Once    sodium chloride 0.9%   Intravenous Once    bisacodyl  10 mg Oral Daily    cycloSPORINE modified  150 mg Oral BID    docusate sodium  100 mg Oral BID    dronabinol  2.5 mg Oral QHS    epoetin maurisio (PROCRIT) injection  12,600 Units Intravenous Every Mon, Wed, Fri    ergocalciferol  50,000 Units Oral Q7 Days    heparin (porcine)  5,000 Units Subcutaneous Q8H    levetiracetam oral soln  500 mg Oral BID    levothyroxine  75 mcg Oral Before breakfast    metoclopramide HCl  10 mg Oral QID (AC & HS)    omeprazole  40 mg Oral QAM    predniSONE  5 mg Oral Daily    sevelamer carbonate  800 mg Oral TID WM    ursodiol  300 mg Oral BID     Continuous Infusions:  PRN Meds:sodium chloride 0.9%, acetaminophen, albuterol-ipratropium 2.5mg-0.5mg/3mL, bisacodyl, butalbital-acetaminophen-caffeine -40 mg, dextrose 50%, dextrose 50%, diphenhydrAMINE-zinc acetate 1-0.1%, glucagon (human recombinant), glucose, glucose, heparin (porcine), naloxone, ondansetron, ondansetron, oxyCODONE, oxyCODONE, prochlorperazine, prochlorperazine, simethicone, sodium chloride 0.9%, sodium chloride 0.9%    Review of Systems   Constitutional: Positive for activity change, appetite change and fatigue. Negative for chills and fever.   HENT: Negative for congestion and facial swelling.    Eyes: Negative for pain, discharge and visual disturbance.   Respiratory: Negative.  Negative for cough, chest tightness, shortness of breath and wheezing.    Cardiovascular: Negative.  Negative for chest pain, palpitations and leg swelling.   Gastrointestinal: Positive for abdominal distention and abdominal pain. Negative for constipation, diarrhea and vomiting.   Endocrine: Negative.    Genitourinary: Negative for decreased urine volume, difficulty urinating, dysuria, hematuria and urgency.   Musculoskeletal: Negative for back pain, gait problem, neck  pain and neck stiffness.   Skin: Negative for color change and pallor.   Allergic/Immunologic: Positive for immunocompromised state.   Neurological: Negative for dizziness, seizures, weakness, light-headedness and headaches.   Psychiatric/Behavioral: Negative for behavioral problems, confusion, dysphoric mood, hallucinations, sleep disturbance and suicidal ideas. The patient is not nervous/anxious.      Objective:     Vital Signs (Most Recent):  Temp: 98.9 °F (37.2 °C) (04/08/18 0720)  Pulse: 88 (04/08/18 0807)  Resp: 15 (04/08/18 0720)  BP: 127/81 (04/08/18 0720)  SpO2: 99 % (04/08/18 0807) Vital Signs (24h Range):  Temp:  [98.4 °F (36.9 °C)-99.6 °F (37.6 °C)] 98.9 °F (37.2 °C)  Pulse:  [75-88] 88  Resp:  [15-19] 15  SpO2:  [95 %-99 %] 99 %  BP: (127-140)/(75-88) 127/81     Weight: 61.7 kg (136 lb 0.4 oz)  Body mass index is 21.97 kg/m².    Intake/Output - Last 3 Shifts       04/06 0700 - 04/07 0659 04/07 0700 - 04/08 0659 04/08 0700 - 04/09 0659    P.O. 1100 90     Other 500      .8      Total Intake(mL/kg) 1937.8 (30.1) 90 (1.4)     Urine (mL/kg/hr) 1410 (0.9) 725 (0.5)     Emesis/NG output       Other 500 (0.3)      Stool 0 (0) 0 (0)     Total Output 1910 725      Net +27.8 -635             Stool Occurrence 0 x 0 x           Physical Exam   Constitutional: He is oriented to person, place, and time. He appears well-developed.   Hand and temporal muscle wasting   HENT:   Head: Normocephalic.   Eyes: Pupils are equal, round, and reactive to light. No scleral icterus.   Cardiovascular: Normal rate, regular rhythm, normal heart sounds and intact distal pulses.    Pulmonary/Chest: Effort normal and breath sounds normal. No respiratory distress. He has no wheezes. He has no rhonchi. He has no rales.   Abdominal: Soft. Bowel sounds are normal. He exhibits distension. There is no tenderness.   Musculoskeletal: Normal range of motion. He exhibits edema (generalized).   Neurological: He is alert and oriented to  person, place, and time.   Skin: Skin is warm and dry.   Psychiatric: He has a normal mood and affect. His behavior is normal. Judgment and thought content normal.   Nursing note and vitals reviewed.      Laboratory:  Immunosuppressants         Stop Route Frequency     cycloSPORINE modified capsule 150 mg      -- Oral 2 times daily     cycloSPORINE modified (NEORAL) 25 MG capsule      03/09 1714       cycloSPORINE modified (NEORAL) 100 MG capsule      03/09 1714       cycloSPORINE modified (NEORAL) 100 MG capsule      03/09 0514       cycloSPORINE modified (NEORAL) 25 MG capsule      03/09 0514          CBC:     Recent Labs  Lab 04/08/18  0530   WBC 3.45*   RBC 2.58*   HGB 8.5*   HCT 26.3*   PLT 59*   *   MCH 32.9*   MCHC 32.3     CMP:     Recent Labs  Lab 04/08/18 0530   GLU 82   CALCIUM 9.2   ALBUMIN 2.7*   PROT 5.7*      K 4.5   CO2 22*      BUN 57*   CREATININE 2.9*   ALKPHOS 143*   ALT 13   AST 24   BILITOT 1.3*     Labs within the past 24 hours have been reviewed.    Diagnostic Results:  pertinent imaging reviewed

## 2018-04-08 NOTE — ASSESSMENT & PLAN NOTE
- h/o constipation, abdominal pain, and N/V  - worsened with SHANE tube and TF --> now on TPN  - encourage bowel regimen  - EGD performed 1/25 and 3/6, gastroparesis, duodenal ulcer, and congested/erythematous gastric mucosa  - Small bowel through with normal findings  - Continue reglan and marinol scheduled, anti-emetics PRN.   - If cont with GI symptoms then may need to consider Flex sig/ C-scope to further eval.   - Abd xray with distended colon - did not want enema  - D/C Cellcept 3/31 (has met protocol for length of treatment)  - Abd pain remains stable. if worsens will perform CT A/P

## 2018-04-08 NOTE — PROGRESS NOTES
[facility]  Liver Transplant  Progress Note    Patient Name: Jhonny Diana  MRN: [id number]  Admission Date: 2018  Hospital Length of Stay: 87 days  Code Status: Full Code  Primary Care Provider: Primary Doctor No  Post-Operative Day: 171    ORGAN:   LIVER  Disease Etiology: Acute Alcoholic Hepatitis  Donor Type:    - Brain Death  CDC High Risk:   No  Donor CMV Status:   Donor CMV Status: Positive  Donor HBcAB:   Negative  Donor HCV Status:   Negative  Whole or Partial: Whole Liver  Biliary Anastomosis: End to End  Arterial Anatomy: Standard  Subjective:     History of Present Illness:  Jhonny Diana is a 28 y/o male with past medical history of alcoholic cirrhosis.  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 Found to have peritonitis in November (WBC 5000, 75% PNM) neg for bile leak. He was treated initially with vanc/cefepime. Repeat cell counts  with some improvement (WBC 1400, 45% PNM). He has undergone multiple paracenteses as well as abscess drainage of perihepatic fluid collections and treated with antimicrobial therapy but no positive cultures. Of note, biliary stricture also identified and ERCP performed on 2017 with sphincterotomy and biliary stent placed. Liver tests still have not normalized despite intervention, bilirubin and AP remain elevated. Other pertinent PMH current wound vac in place 2/2 wound infection, malnutrition requiring TPN for short course and ongoing hypoalbuminemia, and seizure activity while on prograf and has since been switched to cyclosporine without reoccurrence.  He presented to the ER for fever, abdominal pain, and N/V. He reports fever (103) for 1  day prior. Overnight, he developed N/V, reports small amount of green emesis with new left sided pain. He also endorses worsening SOB with exertion. He was scheduled as an outpatient for follow up paracentesis and IR drainage of fluid collection. CXR in ER shows large pleural effusion with subsegmental atelectasis. Infectious work up initiated in ER. His ANC is 900. Broad spectrum antibiotics initiated in ED. At admit, he denied chest pain, palpitations, diarrhea, constipation, back pain, or any sick contacts.    Hospital Course:  Unclear cause of ongoing fevers. Multiple infectious, diagnostic testing completed. Taken to OR for ex lap 2/6, loculated fluid collections found, cultures NGTD. Multiple blood cultures with NGTD. Thoracentesis 1/11, 1/18, 1/31, and 2/16, cultures NGTD. Paracentesis 1/11, 1/19, and 2/5, cultures NGTD. negative fungal markers/quant gold intermediate. Negative TTE.  IR drainage 1/11, cultures NGTD. Continued with intermittent fevers despite broad spectrum antibiotics that were initially started 1/11, then broadened antibiotics 2/22 from vanc/zosyn to ertapenem. Multiple CT scan of abd obtained that were unremarkable. Tagged WBCs scan 2/27 without source of infection found. He had several episodes of neutropenia requiring neupogen, last given 2/28. 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 Monitoring weekly CMV PCR. Chimerism studies obtained 3/1 to assess for GVHD, which was negative, EBV PCR negative, adenovirus negative. In addition to fevers, he has consistently c/o abdominal pain and N/V. Tbili has remain elevated post-op requiring multiple ERCPs. ERCPs performed this admission on 1/30, 2/19, and 3/9. ERCPs with sludge and stones with stents placed. Last ERCP 3/9 with metal stent placed into CBD and stones removed. His ertapenem was d/cd 3/7 due to AMS. Following ERCP 3/9, he was started on cefepime and flagyl  for possible cholangitis. He has remained afebrile since 3/10. He completed cefepime/flagyl after 3 weeks on 3/19 & 3/20. Due to elevated enzymes, he had a liver biopsy 1/23 consistent with cholestatic hepatitis (no cultures obtained during biopsy). GI was consulted for persistent N/V and malnutrition. EGD performed 1/25 and 3/6, EGD 3/6 with normal esophagus congested, erythematous and nodular mucosa in the stomach, one duodenal ulcer with a clean ulcer base (Emir Class III), biopsies taken, negative for CMV and GVHD. Due to N/V and abdominal pain, multiple attempts were made to place SHANE tube with tube feeding, but patient did not tolerate TF. Therefore, he was transitioned to TPN 1/16 then again on 2/24. He also has chronic constipation. Small bowel follow through study with normal findings. Following EGD 1/25, he was started on a PPI, miralax, and Metamucil, but he did not tolerate meds. For appetite stimulation he was trialed on marinol. He also did well with reglan before meals and at bedtime. Early post-op liver transplant surgery, he was switched to cyclosporine for seizures. During this admission, he was transitioned back to prograf 2/24 with keppra on board. Starting 3/1 he developed stuttering speech, disorientation. CT head 3/1 and MRI head 3/6 unremarkable. EEG 3/8 with no seizure activity. He started on rapamune and stress dose steroids 3/10, then ultimately returned to cyclosporine 3/13 and rapamune d/cd same day. Since transitioning back to cyclosporine, his mental status has remained stable. During admission, patient was evaluated by [facility] for future kidney transplant, and he was deemed to be a suitable candidate once medically stable and cleared. He dialyzes on a M-W-F schedule. He was anuric on admission, but starting producing urine around 3/16.  Now producing >2-3L per day.       Interval History: No acute events overnight. Patient with some nausea/vomiting yesterday but reports that he was  able to eat. He feels well this morning. Motivated to eat and ambulate. Cont to monitor.     Scheduled Meds:   sodium chloride 0.9%   Intravenous Once    sodium chloride 0.9%   Intravenous Once    sodium chloride 0.9%   Intravenous Once    bisacodyl  10 mg Oral Daily    cycloSPORINE modified  150 mg Oral BID    docusate sodium  100 mg Oral BID    dronabinol  2.5 mg Oral QHS    epoetin maurisio (PROCRIT) injection  12,600 Units Intravenous Every Mon, Wed, Fri    ergocalciferol  50,000 Units Oral Q7 Days    heparin (porcine)  5,000 Units Subcutaneous Q8H    levetiracetam oral soln  500 mg Oral BID    levothyroxine  75 mcg Oral Before breakfast    metoclopramide HCl  10 mg Oral QID (AC & HS)    omeprazole  40 mg Oral QAM    predniSONE  5 mg Oral Daily    sevelamer carbonate  800 mg Oral TID WM    ursodiol  300 mg Oral BID     Continuous Infusions:  PRN Meds:sodium chloride 0.9%, acetaminophen, albuterol-ipratropium 2.5mg-0.5mg/3mL, bisacodyl, butalbital-acetaminophen-caffeine -40 mg, dextrose 50%, dextrose 50%, diphenhydrAMINE-zinc acetate 1-0.1%, glucagon (human recombinant), glucose, glucose, heparin (porcine), naloxone, ondansetron, ondansetron, oxyCODONE, oxyCODONE, prochlorperazine, prochlorperazine, simethicone, sodium chloride 0.9%, sodium chloride 0.9%    Review of Systems   Constitutional: Positive for activity change, appetite change and fatigue. Negative for chills and fever.   HENT: Negative for congestion and facial swelling.    Eyes: Negative for pain, discharge and visual disturbance.   Respiratory: Negative.  Negative for cough, chest tightness, shortness of breath and wheezing.    Cardiovascular: Negative.  Negative for chest pain, palpitations and leg swelling.   Gastrointestinal: Positive for abdominal distention and abdominal pain. Negative for constipation, diarrhea and vomiting.   Endocrine: Negative.    Genitourinary: Negative for decreased urine volume, difficulty urinating,  dysuria, hematuria and urgency.   Musculoskeletal: Negative for back pain, gait problem, neck pain and neck stiffness.   Skin: Negative for color change and pallor.   Allergic/Immunologic: Positive for immunocompromised state.   Neurological: Negative for dizziness, seizures, weakness, light-headedness and headaches.   Psychiatric/Behavioral: Negative for behavioral problems, confusion, dysphoric mood, hallucinations, sleep disturbance and suicidal ideas. The patient is not nervous/anxious.      Objective:     Vital Signs (Most Recent):  Temp: 98.9 °F (37.2 °C) (04/08/18 0720)  Pulse: 88 (04/08/18 0807)  Resp: 15 (04/08/18 0720)  BP: 127/81 (04/08/18 0720)  SpO2: 99 % (04/08/18 0807) Vital Signs (24h Range):  Temp:  [98.4 °F (36.9 °C)-99.6 °F (37.6 °C)] 98.9 °F (37.2 °C)  Pulse:  [75-88] 88  Resp:  [15-19] 15  SpO2:  [95 %-99 %] 99 %  BP: (127-140)/(75-88) 127/81     Weight: 61.7 kg (136 lb 0.4 oz)  Body mass index is 21.97 kg/m².    Intake/Output - Last 3 Shifts       04/06 0700 - 04/07 0659 04/07 0700 - 04/08 0659 04/08 0700 - 04/09 0659    P.O. 1100 90     Other 500      .8      Total Intake(mL/kg) 1937.8 (30.1) 90 (1.4)     Urine (mL/kg/hr) 1410 (0.9) 725 (0.5)     Emesis/NG output       Other 500 (0.3)      Stool 0 (0) 0 (0)     Total Output 1910 725      Net +27.8 -635             Stool Occurrence 0 x 0 x           Physical Exam   Constitutional: He is oriented to person, place, and time. He appears well-developed.   Hand and temporal muscle wasting   HENT:   Head: Normocephalic.   Eyes: Pupils are equal, round, and reactive to light. No scleral icterus.   Cardiovascular: Normal rate, regular rhythm, normal heart sounds and intact distal pulses.    Pulmonary/Chest: Effort normal and breath sounds normal. No respiratory distress. He has no wheezes. He has no rhonchi. He has no rales.   Abdominal: Soft. Bowel sounds are normal. He exhibits distension. There is no tenderness.   Musculoskeletal: Normal  range of motion. He exhibits edema (generalized).   Neurological: He is alert and oriented to person, place, and time.   Skin: Skin is warm and dry.   Psychiatric: He has a normal mood and affect. His behavior is normal. Judgment and thought content normal.   Nursing note and vitals reviewed.      Laboratory:  Immunosuppressants         Stop Route Frequency     cycloSPORINE modified capsule 150 mg      -- Oral 2 times daily     cycloSPORINE modified (NEORAL) 25 MG capsule      03/09 1714       cycloSPORINE modified (NEORAL) 100 MG capsule      03/09 1714       cycloSPORINE modified (NEORAL) 100 MG capsule      03/09 0514       cycloSPORINE modified (NEORAL) 25 MG capsule      03/09 0514          CBC:     Recent Labs  Lab 04/08/18  0530   WBC 3.45*   RBC 2.58*   HGB 8.5*   HCT 26.3*   PLT 59*   *   MCH 32.9*   MCHC 32.3     CMP:     Recent Labs  Lab 04/08/18 0530   GLU 82   CALCIUM 9.2   ALBUMIN 2.7*   PROT 5.7*      K 4.5   CO2 22*      BUN 57*   CREATININE 2.9*   ALKPHOS 143*   ALT 13   AST 24   BILITOT 1.3*     Labs within the past 24 hours have been reviewed.    Diagnostic Results:  pertinent imaging reviewed    Assessment/Plan:     Hyperphosphatemia    - cont renvela. Monitor.         Vitamin D deficiency    - Continue ergo 50K weekly.         Other cytomegaloviral diseases    - detected at 370 on CMV PCR 2/1.  - case discussed with ID and will hold off on treatment at this time given low WBC.   - undetected CMV PCR 2/8, 2/15, 2/22, 3/8   - continue Valcyte 200 mg every Mon, Wed, Fri.  - Per ID, valcyte dose decreased to ppx dose on 2/18--Valcyte d/c'd 2/25  - Repeat scope 3/6 to assess persistent N/V and possible CMV causing these symptoms. Biopsies thus far normal, cmv stains negative.  - continue to monitor weekly CMV PCR's, last PCR neg from 3/15, recent level from 3/22 undetected.  - CMV 3/29 undetected.        Constipation    - chronic  - encourage ambulation, stool softeners and  laxatives, often refuses some meds 2/2 nausea, will often require suppository or brown bomb enema to have a BM.  - Small bowel follow through with normal findings.  - Avoid narcotics -need to ween        Severe protein-calorie malnutrition    - Poor PO intake since transplant requiring short course of TPN during previous hospital stay.   - Due to N/V and abdominal pain, multiple attempts were made to place SHANE tube with tube feeding, but patient did not tolerate TF  - Transitioned to TPN 1/16 then again on 2/24 due to poor tolerance of SHANE tube/TF  - prealbumin 7 on 1/15 --> prealbumin 22 on 3/19, monitoring weekly (Mondays)  - EGD performed 1/25 and 3/6, gastroparesis, duodenal ulcer, and congested/erythematous gastric mucosa  - Small bowel through with normal findings.  - Consulted GI for GJ tube placement, but was canceled due to ongoing fevers  - Hold Reglan and Marinol 3/8 for neuro symptoms --> restarted reglan and marinol 3/19, will need to dc marinol before discharge because not covered by insurance.  - set goals for caloric intake with patient, continue nocturnal TPN started 3/20 PM to encourage appetite during the day  - Trying to set small caloric goals daily, for now goal is 300-500 jalen/day. Ultimately needs to consume 700 jalen/day to start weaning TPN.    -met with dietician 4/3/18 to discuss dietary goals.   - TPN stopped on 4/6/18 as intake significantly improving.         Nausea and vomiting    - h/o constipation, abdominal pain, and N/V  - worsened with SHANE tube and TF --> now on TPN  - encourage bowel regimen  - EGD performed 1/25 and 3/6, gastroparesis, duodenal ulcer, and congested/erythematous gastric mucosa  - Small bowel through with normal findings  - Continue reglan and marinol scheduled, anti-emetics PRN.   - If cont with GI symptoms then may need to consider Flex sig/ C-scope to further eval.   - Abd xray with distended colon - did not want enema  - D/C Cellcept 3/31 (has met protocol for  length of treatment)  - Abd pain remains stable. if worsens will perform CT A/P        Biliary stricture of transplanted liver    - ERCP 12/6 with post-anastomosis stricture with stent placement.  - Tbili with increase prompting ERCP 1/30 with stones and sludge, stents placed.  - Repeat ERCP 2/19 as was having recurrent fevers with bilirubin stuck at 2.0-2.2 with overall unremarkable findings seen. Did place new larger stents.   - ERCP/EUS 3/9 with metal stent placed in bile duct, stones removed.  - Tbili remains stable.   - Continue actigall.        Anemia of chronic disease    - Continue Procrit  - Monitor daily labs. H/H stable, transfuse PRN.         Delayed surgical wound healing    - Wd vac removed 1/12/18.    - Wound healing well.   - Changing dressing q Tuesday, last 3/27.        Seizure    - 3/1, patient with stuttering speech, trouble recalling specific words when speaking. Does have hx of seizure (on keppra) previously while on prograf. Switched to prograf over the weekend from cyclosporine. Neuro exam unremarkable.   - CT head without contrast obtained, unremarkable.   - In light of these symptoms, prograf d/c, restart cyclosporine 3/1.  - remains w/o seizure. Cont to have stutter/garbled speech/difficulty finding words.  Worse post EGD as sedation was given.  - MRI 3/6 unremarkable.  - EEG completed 3/8 - mild, generalized, non-specific cerebral dysfunction, no epileptiform activity.  - monitor closely. VSS.        At risk for opportunistic infections    - See other cytomegaloviral disease  - Pentam given 3/27  - D/C Isavu 3/21.        Long-term use of immunosuppressant medication    - Maintenance IS with cyclosporine --> transitioned to Prograf 2/24 for continued nausea but did not tolerate from neuro standpoint.  PT IS NOT A CANDIDATE FOR TACROLIMUS.    - Transitioned back to cyclosporine 3/1  - D/c cyclosporine, started rapa & stress dose steroids 3/10 for continued confusion  - Mental status now  improved, re-trial cyclosporine 3/13, d/c rapa 3/13  - Transition back to prednisone taper from stress dose steroids 3/15 --> will decrease by 5 mg q week (decrease to 5 mg 4/6).  - Restarted low dose MMF 3/23- with nausea --> D/C Cellcept 3/31 --> nausea improved --> continue to hold as has passed protocol for Cellcept        Prophylactic immunotherapy    - See long term use of immunosuppression.         Liver transplanted    - Post op course complicated by fevers and hyperbilirubinemia, AST/ALT stable.  - see biliary stricture of transplanted liver  - Liver biopsy 1/23 without rejection.  - Paracentesis 3/6 negative for infection, wbc 28 segs 3%.  - ERCP, EUS biopsy 3/9/18 - no rejection, mild cholestatic hepatitis seen.   - T bili normalized        Acute renal failure with tubular necrosis    - HD resumed on previous admission. Anuric on admission and dialyzes M-W-F.  - Nephrology following.    - Goal is for kidney txp in future- currently not a candidate due to health status.  - urine output has improved since 3/16  - HD held 3/21 & 3/23  - Nephrology started torsemide 50 mg daily 3/24  - BUN elevated though on TPN, HD 3/29/18   - Will plan HD twice weekly for now   HD held 4/3, had on HD 4/6  - Hold torsemide for now (d/roselia on 3/31)            VTE Risk Mitigation         Ordered     heparin (porcine) injection 1,000 Units  As needed (PRN)     Route:  Intra-Catheter        03/12/18 1134     heparin (porcine) injection 5,000 Units  Every 8 hours     Route:  Subcutaneous        03/08/18 1622     Medium Risk of VTE  Once      01/11/18 0351     Place sequential compression device  Until discontinued      01/11/18 0351          The patients clinical status was discussed at multidisplinary rounds, involving transplant surgery, transplant medicine, pharmacy, nursing, nutrition, and social work    Discharge Planning: Not a candidate for discharge at this time.   Monitor kidney function --> may need outpt HD  Monitor HH  needs vs rehab for deconditioned status      Citlali Bridges PA-C  Liver Transplant  [facility]

## 2018-04-08 NOTE — PLAN OF CARE
Patient's VSS for shift. Patient remains AAOx4, calm, cooperative, and walks with support of pushing a wheelchair. Patient did not eat breakfast. Ate a honey bun and half a sandwich in the afternoon. Stated he was going down to the cafeteria to get something hot for dinner. Patient states pain in abdomen is continuing. Free from falls, injury, and skin breakdown.

## 2018-04-09 LAB
ALBUMIN SERPL BCP-MCNC: 2.7 G/DL
ALP SERPL-CCNC: 129 U/L
ALT SERPL W/O P-5'-P-CCNC: 10 U/L
ANION GAP SERPL CALC-SCNC: 12 MMOL/L
AST SERPL-CCNC: 19 U/L
BASOPHILS # BLD AUTO: 0.01 K/UL
BASOPHILS NFR BLD: 0.3 %
BILIRUB SERPL-MCNC: 1.3 MG/DL
BUN SERPL-MCNC: 59 MG/DL
CALCIUM SERPL-MCNC: 8.9 MG/DL
CHLORIDE SERPL-SCNC: 106 MMOL/L
CO2 SERPL-SCNC: 21 MMOL/L
CREAT SERPL-MCNC: 3.2 MG/DL
CYCLOSPORINE BLD LC/MS/MS-MCNC: 203 NG/ML
DIFFERENTIAL METHOD: ABNORMAL
EOSINOPHIL # BLD AUTO: 0.1 K/UL
EOSINOPHIL NFR BLD: 2 %
ERYTHROCYTE [DISTWIDTH] IN BLOOD BY AUTOMATED COUNT: 14.3 %
EST. GFR  (AFRICAN AMERICAN): 28.9 ML/MIN/1.73 M^2
EST. GFR  (NON AFRICAN AMERICAN): 25 ML/MIN/1.73 M^2
GLUCOSE SERPL-MCNC: 87 MG/DL
HAV IGM SERPL QL IA: NEGATIVE
HBV CORE IGM SERPL QL IA: NEGATIVE
HBV SURFACE AG SERPL QL IA: NEGATIVE
HCT VFR BLD AUTO: 25 %
HCV AB SERPL QL IA: NEGATIVE
HGB BLD-MCNC: 8.1 G/DL
IMM GRANULOCYTES # BLD AUTO: 0.01 K/UL
IMM GRANULOCYTES NFR BLD AUTO: 0.3 %
LYMPHOCYTES # BLD AUTO: 1 K/UL
LYMPHOCYTES NFR BLD: 28.4 %
MAGNESIUM SERPL-MCNC: 1.7 MG/DL
MCH RBC QN AUTO: 33.1 PG
MCHC RBC AUTO-ENTMCNC: 32.4 G/DL
MCV RBC AUTO: 102 FL
MONOCYTES # BLD AUTO: 0.4 K/UL
MONOCYTES NFR BLD: 10.5 %
NEUTROPHILS # BLD AUTO: 2.1 K/UL
NEUTROPHILS NFR BLD: 58.5 %
NRBC BLD-RTO: 0 /100 WBC
PHOSPHATE SERPL-MCNC: 6.3 MG/DL
PLATELET # BLD AUTO: 56 K/UL
PMV BLD AUTO: 12.1 FL
POTASSIUM SERPL-SCNC: 4.4 MMOL/L
PREALB SERPL-MCNC: 17 MG/DL
PROT SERPL-MCNC: 5.7 G/DL
RBC # BLD AUTO: 2.45 M/UL
SODIUM SERPL-SCNC: 139 MMOL/L
WBC # BLD AUTO: 3.52 K/UL

## 2018-04-09 PROCEDURE — 94761 N-INVAS EAR/PLS OXIMETRY MLT: CPT

## 2018-04-09 PROCEDURE — 63600175 PHARM REV CODE 636 W HCPCS: Performed by: NURSE PRACTITIONER

## 2018-04-09 PROCEDURE — 25000003 PHARM REV CODE 250: Performed by: NURSE PRACTITIONER

## 2018-04-09 PROCEDURE — 80053 COMPREHEN METABOLIC PANEL: CPT

## 2018-04-09 PROCEDURE — 25000003 PHARM REV CODE 250: Performed by: PHYSICIAN ASSISTANT

## 2018-04-09 PROCEDURE — 83735 ASSAY OF MAGNESIUM: CPT

## 2018-04-09 PROCEDURE — 84100 ASSAY OF PHOSPHORUS: CPT

## 2018-04-09 PROCEDURE — 25000003 PHARM REV CODE 250

## 2018-04-09 PROCEDURE — 90935 HEMODIALYSIS ONE EVALUATION: CPT

## 2018-04-09 PROCEDURE — A4216 STERILE WATER/SALINE, 10 ML: HCPCS | Performed by: ANESTHESIOLOGY

## 2018-04-09 PROCEDURE — 84134 ASSAY OF PREALBUMIN: CPT

## 2018-04-09 PROCEDURE — 90935 HEMODIALYSIS ONE EVALUATION: CPT | Mod: ,,, | Performed by: INTERNAL MEDICINE

## 2018-04-09 PROCEDURE — 96372 THER/PROPH/DIAG INJ SC/IM: CPT

## 2018-04-09 PROCEDURE — 25000003 PHARM REV CODE 250: Performed by: ANESTHESIOLOGY

## 2018-04-09 PROCEDURE — 85025 COMPLETE CBC W/AUTO DIFF WBC: CPT

## 2018-04-09 PROCEDURE — 80158 DRUG ASSAY CYCLOSPORINE: CPT

## 2018-04-09 PROCEDURE — 20600001 HC STEP DOWN PRIVATE ROOM

## 2018-04-09 PROCEDURE — 94664 DEMO&/EVAL PT USE INHALER: CPT

## 2018-04-09 PROCEDURE — 63600175 PHARM REV CODE 636 W HCPCS: Performed by: PHYSICIAN ASSISTANT

## 2018-04-09 PROCEDURE — 25000003 PHARM REV CODE 250: Performed by: INTERNAL MEDICINE

## 2018-04-09 PROCEDURE — 99233 SBSQ HOSP IP/OBS HIGH 50: CPT | Mod: ,,, | Performed by: NURSE PRACTITIONER

## 2018-04-09 RX ORDER — METOCLOPRAMIDE 10 MG/1
10 TABLET ORAL
Status: DISCONTINUED | OUTPATIENT
Start: 2018-04-09 | End: 2018-04-10 | Stop reason: HOSPADM

## 2018-04-09 RX ORDER — PREDNISONE 5 MG/1
5 TABLET ORAL DAILY
Qty: 4 TABLET | Refills: 0 | Status: SHIPPED | OUTPATIENT
Start: 2018-04-10 | End: 2018-04-14

## 2018-04-09 RX ORDER — METOCLOPRAMIDE HYDROCHLORIDE 5 MG/5ML
10 SOLUTION ORAL
Qty: 1200 ML | Refills: 1 | Status: CANCELLED | OUTPATIENT
Start: 2018-04-09

## 2018-04-09 RX ORDER — SEVELAMER CARBONATE 800 MG/1
800 TABLET, FILM COATED ORAL
Qty: 90 TABLET | Refills: 11 | Status: ON HOLD | OUTPATIENT
Start: 2018-04-09 | End: 2018-05-04 | Stop reason: HOSPADM

## 2018-04-09 RX ORDER — METOCLOPRAMIDE 10 MG/1
10 TABLET ORAL
Qty: 120 TABLET | Refills: 1 | Status: ON HOLD | OUTPATIENT
Start: 2018-04-09 | End: 2018-06-04 | Stop reason: HOSPADM

## 2018-04-09 RX ORDER — CYCLOSPORINE 100 MG/1
100 CAPSULE, LIQUID FILLED ORAL 2 TIMES DAILY
Qty: 60 CAPSULE | Refills: 11 | Status: ON HOLD | OUTPATIENT
Start: 2018-04-09 | End: 2018-06-04 | Stop reason: HOSPADM

## 2018-04-09 RX ORDER — ERGOCALCIFEROL 1.25 MG/1
50000 CAPSULE ORAL
Qty: 4 CAPSULE | Refills: 2 | Status: SHIPPED | OUTPATIENT
Start: 2018-04-09 | End: 2018-06-26

## 2018-04-09 RX ORDER — URSODIOL 300 MG/1
300 CAPSULE ORAL 2 TIMES DAILY
Qty: 60 CAPSULE | Refills: 11 | Status: SHIPPED | OUTPATIENT
Start: 2018-04-09 | End: 2019-04-12

## 2018-04-09 RX ORDER — CYCLOSPORINE 25 MG/1
50 CAPSULE, LIQUID FILLED ORAL 2 TIMES DAILY
Qty: 120 CAPSULE | Refills: 5 | Status: SHIPPED | OUTPATIENT
Start: 2018-04-09 | End: 2018-04-10

## 2018-04-09 RX ORDER — URSODIOL 300 MG/1
300 CAPSULE ORAL 2 TIMES DAILY
Status: DISCONTINUED | OUTPATIENT
Start: 2018-04-09 | End: 2018-04-10 | Stop reason: HOSPADM

## 2018-04-09 RX ORDER — LEVETIRACETAM 100 MG/ML
500 SOLUTION ORAL 2 TIMES DAILY
Qty: 300 ML | Refills: 11 | Status: CANCELLED | OUTPATIENT
Start: 2018-04-09 | End: 2019-04-09

## 2018-04-09 RX ORDER — DOCUSATE SODIUM 100 MG/1
100 CAPSULE, LIQUID FILLED ORAL 2 TIMES DAILY
Qty: 60 CAPSULE | Refills: 0 | Status: SHIPPED | OUTPATIENT
Start: 2018-04-09 | End: 2018-04-23

## 2018-04-09 RX ORDER — LEVETIRACETAM 500 MG/1
500 TABLET ORAL 2 TIMES DAILY
Status: DISCONTINUED | OUTPATIENT
Start: 2018-04-09 | End: 2018-04-10 | Stop reason: HOSPADM

## 2018-04-09 RX ORDER — BISACODYL 5 MG
10 TABLET, DELAYED RELEASE (ENTERIC COATED) ORAL DAILY
Qty: 60 TABLET | Refills: 0 | Status: SHIPPED | OUTPATIENT
Start: 2018-04-10 | End: 2018-06-19

## 2018-04-09 RX ADMIN — SODIUM CHLORIDE: 0.9 INJECTION, SOLUTION INTRAVENOUS at 02:04

## 2018-04-09 RX ADMIN — SODIUM CHLORIDE, PRESERVATIVE FREE 3 ML: 5 INJECTION INTRAVENOUS at 05:04

## 2018-04-09 RX ADMIN — OXYCODONE HYDROCHLORIDE 10 MG: 5 TABLET ORAL at 03:04

## 2018-04-09 RX ADMIN — OXYCODONE HYDROCHLORIDE 5 MG: 5 TABLET ORAL at 12:04

## 2018-04-09 RX ADMIN — DOCUSATE SODIUM 100 MG: 100 CAPSULE, LIQUID FILLED ORAL at 09:04

## 2018-04-09 RX ADMIN — ERYTHROPOIETIN 12600 UNITS: 20000 INJECTION, SOLUTION INTRAVENOUS; SUBCUTANEOUS at 06:04

## 2018-04-09 RX ADMIN — LEVETIRACETAM 500 MG: 100 SOLUTION ORAL at 09:04

## 2018-04-09 RX ADMIN — OXYCODONE HYDROCHLORIDE 5 MG: 5 TABLET ORAL at 08:04

## 2018-04-09 RX ADMIN — OXYCODONE HYDROCHLORIDE 10 MG: 5 TABLET ORAL at 11:04

## 2018-04-09 RX ADMIN — METOCLOPRAMIDE HYDROCHLORIDE 10 MG: 5 SOLUTION ORAL at 05:04

## 2018-04-09 RX ADMIN — OXYCODONE HYDROCHLORIDE 10 MG: 5 TABLET ORAL at 10:04

## 2018-04-09 RX ADMIN — DOCUSATE SODIUM 100 MG: 100 CAPSULE, LIQUID FILLED ORAL at 08:04

## 2018-04-09 RX ADMIN — METOCLOPRAMIDE 10 MG: 10 TABLET ORAL at 08:04

## 2018-04-09 RX ADMIN — LEVOTHYROXINE SODIUM 75 MCG: 75 TABLET ORAL at 05:04

## 2018-04-09 RX ADMIN — OXYCODONE HYDROCHLORIDE 10 MG: 5 TABLET ORAL at 06:04

## 2018-04-09 RX ADMIN — OXYCODONE HYDROCHLORIDE 10 MG: 5 TABLET ORAL at 02:04

## 2018-04-09 RX ADMIN — CYCLOSPORINE 150 MG: 100 CAPSULE, LIQUID FILLED ORAL at 09:04

## 2018-04-09 RX ADMIN — LEVETIRACETAM 500 MG: 500 TABLET ORAL at 08:04

## 2018-04-09 RX ADMIN — METOCLOPRAMIDE HYDROCHLORIDE 10 MG: 5 SOLUTION ORAL at 01:04

## 2018-04-09 RX ADMIN — URSODIOL 300 MG: 300 CAPSULE ORAL at 08:04

## 2018-04-09 RX ADMIN — SEVELAMER CARBONATE 800 MG: 800 TABLET, FILM COATED ORAL at 01:04

## 2018-04-09 RX ADMIN — URSODIOL 300 MG: 300 CAPSULE ORAL at 09:04

## 2018-04-09 RX ADMIN — DRONABINOL 2.5 MG: 2.5 CAPSULE ORAL at 08:04

## 2018-04-09 RX ADMIN — OMEPRAZOLE 40 MG: 40 CAPSULE, DELAYED RELEASE ORAL at 07:04

## 2018-04-09 RX ADMIN — BISACODYL 10 MG: 5 TABLET, COATED ORAL at 09:04

## 2018-04-09 RX ADMIN — HEPARIN SODIUM 1000 UNITS: 1000 INJECTION, SOLUTION INTRAVENOUS; SUBCUTANEOUS at 06:04

## 2018-04-09 RX ADMIN — CYCLOSPORINE 150 MG: 100 CAPSULE, LIQUID FILLED ORAL at 08:04

## 2018-04-09 RX ADMIN — PREDNISONE 5 MG: 5 TABLET ORAL at 09:04

## 2018-04-09 NOTE — ASSESSMENT & PLAN NOTE
- chronic  - encourage ambulation, stool softeners and laxatives, often refuses some meds 2/2 nausea, will often require suppository or brown bomb enema to have a BM.  - Small bowel follow through with normal findings.  - Avoid narcotics -need to wean

## 2018-04-09 NOTE — PROGRESS NOTES
Discharge Planning:     SW contact [facility]# [phone] to discuss the pts discharge plans.  Per Chano the patient has a chair that is still available and all information is updated.  Chano reported at this time all the pt should do is report to HD on Wednesday no further information is required at this time.  Per notes the pts chair is setup as chronic instead of acute. SW remains available.

## 2018-04-09 NOTE — PROGRESS NOTES
[facility]  Liver Transplant  Progress Note    Patient Name: Jhonny Diana  MRN: [id number]  Admission Date: 2018  Hospital Length of Stay: 88 days  Code Status: Full Code  Primary Care Provider: Primary Doctor No  Post-Operative Day: 172    ORGAN:   LIVER  Disease Etiology: Acute Alcoholic Hepatitis  Donor Type:    - Brain Death  CDC High Risk:   No  Donor CMV Status:   Donor CMV Status: Positive  Donor HBcAB:   Negative  Donor HCV Status:   Negative  Whole or Partial: Whole Liver  Biliary Anastomosis: End to End  Arterial Anatomy: Standard  Subjective:     History of Present Illness:  Jhonny Diana is a 28 y/o male with past medical history of alcoholic cirrhosis.  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 Found to have peritonitis in November (WBC 5000, 75% PNM) neg for bile leak. He was treated initially with vanc/cefepime. Repeat cell counts  with some improvement (WBC 1400, 45% PNM). He has undergone multiple paracenteses as well as abscess drainage of perihepatic fluid collections and treated with antimicrobial therapy but no positive cultures. Of note, biliary stricture also identified and ERCP performed on 2017 with sphincterotomy and biliary stent placed. Liver tests still have not normalized despite intervention, bilirubin and AP remain elevated. Other pertinent PMH current wound vac in place 2/2 wound infection, malnutrition requiring TPN for short course and ongoing hypoalbuminemia, and seizure activity while on prograf and has since been switched to cyclosporine without reoccurrence.  He presented to the ER for fever, abdominal pain, and N/V. He reports fever (103) for 1  day prior. Overnight, he developed N/V, reports small amount of green emesis with new left sided pain. He also endorses worsening SOB with exertion. He was scheduled as an outpatient for follow up paracentesis and IR drainage of fluid collection. CXR in ER shows large pleural effusion with subsegmental atelectasis. Infectious work up initiated in ER. His ANC is 900. Broad spectrum antibiotics initiated in ED. At admit, he denied chest pain, palpitations, diarrhea, constipation, back pain, or any sick contacts.    Hospital Course:  Unclear cause of ongoing fevers. Multiple infectious, diagnostic testing completed. Taken to OR for ex lap 2/6, loculated fluid collections found, cultures NGTD. Multiple blood cultures with NGTD. Thoracentesis 1/11, 1/18, 1/31, and 2/16, cultures NGTD. Paracentesis 1/11, 1/19, and 2/5, cultures NGTD. negative fungal markers/quant gold intermediate. Negative TTE.  IR drainage 1/11, cultures NGTD. Continued with intermittent fevers despite broad spectrum antibiotics that were initially started 1/11, then broadened antibiotics 2/22 from vanc/zosyn to ertapenem. Multiple CT scan of abd obtained that were unremarkable. Tagged WBCs scan 2/27 without source of infection found. He had several episodes of neutropenia requiring neupogen, last given 2/28. 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 Monitoring weekly CMV PCR. Chimerism studies obtained 3/1 to assess for GVHD, which was negative, EBV PCR negative, adenovirus negative. In addition to fevers, he has consistently c/o abdominal pain and N/V. Tbili has remain elevated post-op requiring multiple ERCPs. ERCPs performed this admission on 1/30, 2/19, and 3/9. ERCPs with sludge and stones with stents placed. Last ERCP 3/9 with metal stent placed into CBD and stones removed. His ertapenem was d/cd 3/7 due to AMS. Following ERCP 3/9, he was started on cefepime and flagyl  for possible cholangitis. He has remained afebrile since 3/10. He completed cefepime/flagyl after 3 weeks on 3/19 & 3/20. Due to elevated enzymes, he had a liver biopsy 1/23 consistent with cholestatic hepatitis (no cultures obtained during biopsy). GI was consulted for persistent N/V and malnutrition. EGD performed 1/25 and 3/6, EGD 3/6 with normal esophagus congested, erythematous and nodular mucosa in the stomach, one duodenal ulcer with a clean ulcer base (Emir Class III), biopsies taken, negative for CMV and GVHD. Due to N/V and abdominal pain, multiple attempts were made to place SHANE tube with tube feeding, but patient did not tolerate TF. Therefore, he was transitioned to TPN 1/16 then again on 2/24. He also has chronic constipation. Small bowel follow through study with normal findings. Following EGD 1/25, he was started on a PPI, miralax, and Metamucil, but he did not tolerate meds. For appetite stimulation he was trialed on marinol. He also did well with reglan before meals and at bedtime. Early post-op liver transplant surgery, he was switched to cyclosporine for seizures. During this admission, he was transitioned back to prograf 2/24 with keppra on board. Starting 3/1 he developed stuttering speech, disorientation. CT head 3/1 and MRI head 3/6 unremarkable. EEG 3/8 with no seizure activity. He started on rapamune and stress dose steroids 3/10, then ultimately returned to cyclosporine 3/13 and rapamune d/cd same day. Since transitioning back to cyclosporine, his mental status has remained stable. During admission, patient was evaluated by [facility] for future kidney transplant, and he was deemed to be a suitable candidate once medically stable and cleared. He dialyzes on a M-W-F schedule. He was anuric on admission, but starting producing urine around 3/16.  Now producing >2-3L per day.       Interval History: No acute events overnight.  Tolerating diet, nausea and vomiting improved. He feels well this  morning. Motivated to eat and ambulate. HD planned today per nephrology.  Cont to monitor.     Scheduled Meds:   sodium chloride 0.9%   Intravenous Once    sodium chloride 0.9%   Intravenous Once    sodium chloride 0.9%   Intravenous Once    sodium chloride 0.9%   Intravenous Once    bisacodyl  10 mg Oral Daily    cycloSPORINE modified  150 mg Oral BID    docusate sodium  100 mg Oral BID    dronabinol  2.5 mg Oral QHS    epoetin maurisio (PROCRIT) injection  12,600 Units Intravenous Every Mon, Wed, Fri    ergocalciferol  50,000 Units Oral Q7 Days    heparin (porcine)  5,000 Units Subcutaneous Q8H    levETIRAcetam  500 mg Oral BID    levothyroxine  75 mcg Oral Before breakfast    metoclopramide HCl  10 mg Oral QID (AC & HS)    omeprazole  40 mg Oral QAM    predniSONE  5 mg Oral Daily    sevelamer carbonate  800 mg Oral TID WM    ursodiol  300 mg Oral BID     Continuous Infusions:  PRN Meds:sodium chloride 0.9%, sodium chloride 0.9%, acetaminophen, albuterol-ipratropium 2.5mg-0.5mg/3mL, bisacodyl, butalbital-acetaminophen-caffeine -40 mg, dextrose 50%, dextrose 50%, diphenhydrAMINE-zinc acetate 1-0.1%, glucagon (human recombinant), glucose, glucose, heparin (porcine), naloxone, ondansetron, ondansetron, oxyCODONE, oxyCODONE, prochlorperazine, prochlorperazine, simethicone, sodium chloride 0.9%, sodium chloride 0.9%    Review of Systems   Constitutional: Positive for activity change, appetite change and fatigue. Negative for chills and fever.   HENT: Negative for congestion and facial swelling.    Eyes: Negative for pain, discharge and visual disturbance.   Respiratory: Negative.  Negative for cough, chest tightness, shortness of breath and wheezing.    Cardiovascular: Negative.  Negative for chest pain, palpitations and leg swelling.   Gastrointestinal: Positive for abdominal pain. Negative for abdominal distention, constipation, diarrhea and vomiting.   Endocrine: Negative.    Genitourinary:  Negative for decreased urine volume, difficulty urinating, dysuria, hematuria and urgency.   Musculoskeletal: Negative for back pain, gait problem, neck pain and neck stiffness.   Skin: Negative for color change and pallor.   Allergic/Immunologic: Positive for immunocompromised state.   Neurological: Negative for dizziness, seizures, weakness, light-headedness and headaches.   Psychiatric/Behavioral: Negative for behavioral problems, confusion, dysphoric mood, hallucinations, sleep disturbance and suicidal ideas. The patient is not nervous/anxious.      Objective:     Vital Signs (Most Recent):  Temp: 98.1 °F (36.7 °C) (04/09/18 1445)  Pulse: 73 (04/09/18 1445)  Resp: 18 (04/09/18 1445)  BP: (!) 141/88 (04/09/18 1445)  SpO2: 96 % (04/09/18 0910) Vital Signs (24h Range):  Temp:  [97.8 °F (36.6 °C)-98.8 °F (37.1 °C)] 98.1 °F (36.7 °C)  Pulse:  [71-78] 73  Resp:  [15-18] 18  SpO2:  [96 %-99 %] 96 %  BP: (120-141)/(68-88) 141/88     Weight: 62 kg (136 lb 11 oz)  Body mass index is 22.07 kg/m².    Intake/Output - Last 3 Shifts       04/07 0700 - 04/08 0659 04/08 0700 - 04/09 0659 04/09 0700 - 04/10 0659    P.O. 90 630 120    Other       TPN       Total Intake(mL/kg) 90 (1.4) 630 (10.2) 120 (1.9)    Urine (mL/kg/hr) 725 (0.5) 1451 (1) 600 (1.2)    Other       Stool 0 (0) 0 (0)     Total Output 725 1451 600    Net -635 -821 -480           Stool Occurrence 0 x 2 x           Physical Exam   Constitutional: He is oriented to person, place, and time. He appears well-developed.   Hand and temporal muscle wasting   HENT:   Head: Normocephalic.   Eyes: Pupils are equal, round, and reactive to light. No scleral icterus.   Cardiovascular: Normal rate, regular rhythm, normal heart sounds and intact distal pulses.    Pulmonary/Chest: Effort normal and breath sounds normal. No respiratory distress. He has no wheezes. He has no rhonchi. He has no rales.   Abdominal: Soft. Bowel sounds are normal. He exhibits distension. There is no  tenderness.   Musculoskeletal: Normal range of motion. He exhibits edema (generalized).   Neurological: He is alert and oriented to person, place, and time.   Skin: Skin is warm and dry.   Psychiatric: He has a normal mood and affect. His behavior is normal. Judgment and thought content normal.   Nursing note and vitals reviewed.      Laboratory:  Immunosuppressants         Stop Route Frequency     cycloSPORINE modified capsule 150 mg      -- Oral 2 times daily     cycloSPORINE modified (NEORAL) 25 MG capsule      03/09 1714       cycloSPORINE modified (NEORAL) 100 MG capsule      03/09 1714       cycloSPORINE modified (NEORAL) 100 MG capsule      03/09 0514       cycloSPORINE modified (NEORAL) 25 MG capsule      03/09 0514          CBC:     Recent Labs  Lab 04/09/18 0515   WBC 3.52*   RBC 2.45*   HGB 8.1*   HCT 25.0*   PLT 56*   *   MCH 33.1*   MCHC 32.4     CMP:     Recent Labs  Lab 04/09/18 0515   GLU 87   CALCIUM 8.9   ALBUMIN 2.7*   PROT 5.7*      K 4.4   CO2 21*      BUN 59*   CREATININE 3.2*   ALKPHOS 129   ALT 10   AST 19   BILITOT 1.3*     Labs within the past 24 hours have been reviewed.    Diagnostic Results:  pertinent imaging reviewed    Assessment/Plan:     Severe protein-calorie malnutrition    - Poor PO intake since transplant requiring short course of TPN during previous hospital stay.   - Due to N/V and abdominal pain, multiple attempts were made to place SHANE tube with tube feeding, but patient did not tolerate TF  - Transitioned to TPN 1/16 then again on 2/24 due to poor tolerance of SHANE tube/TF  - prealbumin 7 on 1/15 --> prealbumin 22 on 3/19, monitoring weekly (Mondays)  - EGD performed 1/25 and 3/6, gastroparesis, duodenal ulcer, and congested/erythematous gastric mucosa  - Small bowel through with normal findings.  - Consulted GI for GJ tube placement, but was canceled due to ongoing fevers  - Hold Reglan and Marinol 3/8 for neuro symptoms --> restarted reglan and marinol  3/19, will need to dc marinol before discharge because not covered by insurance.  - set goals for caloric intake with patient, continue nocturnal TPN started 3/20 PM to encourage appetite during the day  - Trying to set small caloric goals daily, for now goal is 300-500 jalen/day. Ultimately needs to consume 700 jalen/day to start weaning TPN.    -met with dietician 4/3/18 to discuss dietary goals.   - TPN stopped on 4/6/18 as intake significantly improving.         Acute renal failure with tubular necrosis    - HD resumed on previous admission. Anuric on admission and dialyzes M-W-F.  - Nephrology following.    - Goal is for kidney txp in future- currently not a candidate due to health status.  - urine output has improved since 3/16  - HD held 3/21 & 3/23  - Nephrology started torsemide 50 mg daily 3/24  - BUN elevated though on TPN, HD 3/29/18   - Will plan HD twice weekly for now   HD held 4/3, had on HD 4/6  - Hold torsemide for now (d/roselia on 3/31)        Other cytomegaloviral diseases    - detected at 370 on CMV PCR 2/1.  - case discussed with ID and will hold off on treatment at this time given low WBC.   - undetected CMV PCR 2/8, 2/15, 2/22, 3/8   - continue Valcyte 200 mg every Mon, Wed, Fri.  - Per ID, valcyte dose decreased to ppx dose on 2/18--Valcyte d/c'd 2/25  - Repeat scope 3/6 to assess persistent N/V and possible CMV causing these symptoms. Biopsies thus far normal, cmv stains negative.  - continue to monitor weekly CMV PCR's, last PCR neg from 3/15, recent level from 3/22 undetected.  - CMV 4/5 undetected.        Liver transplanted    - Post op course complicated by fevers and hyperbilirubinemia, AST/ALT stable.  - see biliary stricture of transplanted liver  - Liver biopsy 1/23 without rejection.  - Paracentesis 3/6 negative for infection, wbc 28 segs 3%.  - ERCP, EUS biopsy 3/9/18 - no rejection, mild cholestatic hepatitis seen.   - T bili normalized        Constipation    - chronic  - encourage  ambulation, stool softeners and laxatives, often refuses some meds 2/2 nausea, will often require suppository or brown bomb enema to have a BM.  - Small bowel follow through with normal findings.  - Avoid narcotics -need to wean        Long-term use of immunosuppressant medication    - Maintenance IS with cyclosporine --> transitioned to Prograf 2/24 for continued nausea but did not tolerate from neuro standpoint.  PT IS NOT A CANDIDATE FOR TACROLIMUS.    - Transitioned back to cyclosporine 3/1  - D/c cyclosporine, started rapa & stress dose steroids 3/10 for continued confusion  - Mental status now improved, re-trial cyclosporine 3/13, d/c rapa 3/13  - Transition back to prednisone taper from stress dose steroids 3/15 --> will decrease by 5 mg q week (decrease to 5 mg 4/6).  - Restarted low dose MMF 3/23- with nausea --> D/C Cellcept 3/31 --> nausea improved --> continue to hold as has passed protocol for Cellcept        Prophylactic immunotherapy    - See long term use of immunosuppression.         Anemia of chronic disease    - Continue Procrit  - Monitor daily labs. H/H stable, transfuse PRN.         Biliary stricture of transplanted liver    - ERCP 12/6 with post-anastomosis stricture with stent placement.  - Tbili with increase prompting ERCP 1/30 with stones and sludge, stents placed.  - Repeat ERCP 2/19 as was having recurrent fevers with bilirubin stuck at 2.0-2.2 with overall unremarkable findings seen. Did place new larger stents.   - ERCP/EUS 3/9 with metal stent placed in bile duct, stones removed.  - Tbili remains stable.   - Continue actigall.        Seizure    - 3/1, patient with stuttering speech, trouble recalling specific words when speaking. Does have hx of seizure (on keppra) previously while on prograf. Switched to prograf over the weekend from cyclosporine. Neuro exam unremarkable.   - CT head without contrast obtained, unremarkable.   - In light of these symptoms, prograf d/c, restart  cyclosporine 3/1.  - remains w/o seizure. Cont to have stutter/garbled speech/difficulty finding words.  Worse post EGD as sedation was given.  - MRI 3/6 unremarkable.  - EEG completed 3/8 - mild, generalized, non-specific cerebral dysfunction, no epileptiform activity.  - monitor closely. VSS.        At risk for opportunistic infections    - See other cytomegaloviral disease  - Pentam given 3/27  - D/C Isavu 3/21.        Delayed surgical wound healing    - Wd vac removed 1/12/18.    - Wound healing well.   - Changing dressing q Tuesday, last 4/3        Hyperphosphatemia    - cont renvela. Monitor.         Vitamin D deficiency    - Continue ergo 50K weekly.         Nausea and vomiting    - h/o constipation, abdominal pain, and N/V  - worsened with SHANE tube and TF --> now on TPN  - encourage bowel regimen  - EGD performed 1/25 and 3/6, gastroparesis, duodenal ulcer, and congested/erythematous gastric mucosa  - Small bowel through with normal findings  - Continue reglan and marinol scheduled, anti-emetics PRN.   - If cont with GI symptoms then may need to consider Flex sig/ C-scope to further eval.   - Abd xray with distended colon - did not want enema  - D/C Cellcept 3/31 (has met protocol for length of treatment)  - Abd pain remains stable. if worsens will perform CT A/P            VTE Risk Mitigation         Ordered     heparin (porcine) injection 1,000 Units  As needed (PRN)     Route:  Intra-Catheter        03/12/18 1134     heparin (porcine) injection 5,000 Units  Every 8 hours     Route:  Subcutaneous        03/08/18 1622     Medium Risk of VTE  Once      01/11/18 0351     Place sequential compression device  Until discontinued      01/11/18 0351          The patients clinical status was discussed at multidisplinary rounds, involving transplant surgery, transplant medicine, pharmacy, nursing, nutrition, and social work    Discharge Planning: likely tomorrow  Monitor kidney function --> may need outpt  HD  Monitor HH needs vs rehab for deconditioned status      Seda Camacho, NP  Liver Transplant  [facility]

## 2018-04-09 NOTE — ASSESSMENT & PLAN NOTE
- HD resumed on previous admission. Anuric on admission and dialyzes M-W-F.  - Nephrology following.    - Goal is for kidney txp in future- currently not a candidate due to health status.  - urine output has improved since 3/16  - HD held 3/21 & 3/23  - Nephrology started torsemide 50 mg daily 3/24  - BUN elevated though on TPN, HD 3/29/18   - Will plan HD twice weekly for now   HD held 4/3, had on HD 4/6  - Hold torsemide for now (d/roselia on 3/31)

## 2018-04-09 NOTE — PROGRESS NOTES
[facility] NOTE     Patient currently on hemodialysis for removal of uremic toxins .     Patient seen and evaluated on hemodialysis, tolerating treatment, see HD flowsheet for vitals and assessments.      No Hypotension, chest pain, shortness of breath, cramping, nausea or vomiting. Trace BLE edema.       Pre wt 62.5 (previous post wt 62.5 on 4/6).   No UF.   Continue epogen.     Kay Guillen NP  Nephrology

## 2018-04-09 NOTE — ASSESSMENT & PLAN NOTE
- detected at 370 on CMV PCR 2/1.  - case discussed with ID and will hold off on treatment at this time given low WBC.   - undetected CMV PCR 2/8, 2/15, 2/22, 3/8   - continue Valcyte 200 mg every Mon, Wed, Fri.  - Per ID, valcyte dose decreased to ppx dose on 2/18--Valcyte d/c'd 2/25  - Repeat scope 3/6 to assess persistent N/V and possible CMV causing these symptoms. Biopsies thus far normal, cmv stains negative.  - continue to monitor weekly CMV PCR's, last PCR neg from 3/15, recent level from 3/22 undetected.  - CMV 4/5 undetected.

## 2018-04-09 NOTE — SUBJECTIVE & OBJECTIVE
Scheduled Meds:   sodium chloride 0.9%   Intravenous Once    sodium chloride 0.9%   Intravenous Once    sodium chloride 0.9%   Intravenous Once    sodium chloride 0.9%   Intravenous Once    bisacodyl  10 mg Oral Daily    cycloSPORINE modified  150 mg Oral BID    docusate sodium  100 mg Oral BID    dronabinol  2.5 mg Oral QHS    epoetin maurisio (PROCRIT) injection  12,600 Units Intravenous Every Mon, Wed, Fri    ergocalciferol  50,000 Units Oral Q7 Days    heparin (porcine)  5,000 Units Subcutaneous Q8H    levETIRAcetam  500 mg Oral BID    levothyroxine  75 mcg Oral Before breakfast    metoclopramide HCl  10 mg Oral QID (AC & HS)    omeprazole  40 mg Oral QAM    predniSONE  5 mg Oral Daily    sevelamer carbonate  800 mg Oral TID WM    ursodiol  300 mg Oral BID     Continuous Infusions:  PRN Meds:sodium chloride 0.9%, sodium chloride 0.9%, acetaminophen, albuterol-ipratropium 2.5mg-0.5mg/3mL, bisacodyl, butalbital-acetaminophen-caffeine -40 mg, dextrose 50%, dextrose 50%, diphenhydrAMINE-zinc acetate 1-0.1%, glucagon (human recombinant), glucose, glucose, heparin (porcine), naloxone, ondansetron, ondansetron, oxyCODONE, oxyCODONE, prochlorperazine, prochlorperazine, simethicone, sodium chloride 0.9%, sodium chloride 0.9%    Review of Systems   Constitutional: Positive for activity change, appetite change and fatigue. Negative for chills and fever.   HENT: Negative for congestion and facial swelling.    Eyes: Negative for pain, discharge and visual disturbance.   Respiratory: Negative.  Negative for cough, chest tightness, shortness of breath and wheezing.    Cardiovascular: Negative.  Negative for chest pain, palpitations and leg swelling.   Gastrointestinal: Positive for abdominal pain. Negative for abdominal distention, constipation, diarrhea and vomiting.   Endocrine: Negative.    Genitourinary: Negative for decreased urine volume, difficulty urinating, dysuria, hematuria and urgency.    Musculoskeletal: Negative for back pain, gait problem, neck pain and neck stiffness.   Skin: Negative for color change and pallor.   Allergic/Immunologic: Positive for immunocompromised state.   Neurological: Negative for dizziness, seizures, weakness, light-headedness and headaches.   Psychiatric/Behavioral: Negative for behavioral problems, confusion, dysphoric mood, hallucinations, sleep disturbance and suicidal ideas. The patient is not nervous/anxious.      Objective:     Vital Signs (Most Recent):  Temp: 98.1 °F (36.7 °C) (04/09/18 1445)  Pulse: 73 (04/09/18 1445)  Resp: 18 (04/09/18 1445)  BP: (!) 141/88 (04/09/18 1445)  SpO2: 96 % (04/09/18 0910) Vital Signs (24h Range):  Temp:  [97.8 °F (36.6 °C)-98.8 °F (37.1 °C)] 98.1 °F (36.7 °C)  Pulse:  [71-78] 73  Resp:  [15-18] 18  SpO2:  [96 %-99 %] 96 %  BP: (120-141)/(68-88) 141/88     Weight: 62 kg (136 lb 11 oz)  Body mass index is 22.07 kg/m².    Intake/Output - Last 3 Shifts       04/07 0700 - 04/08 0659 04/08 0700 - 04/09 0659 04/09 0700 - 04/10 0659    P.O. 90 630 120    Other       TPN       Total Intake(mL/kg) 90 (1.4) 630 (10.2) 120 (1.9)    Urine (mL/kg/hr) 725 (0.5) 1451 (1) 600 (1.2)    Other       Stool 0 (0) 0 (0)     Total Output 725 1451 600    Net -635 -821 -480           Stool Occurrence 0 x 2 x           Physical Exam   Constitutional: He is oriented to person, place, and time. He appears well-developed.   Hand and temporal muscle wasting   HENT:   Head: Normocephalic.   Eyes: Pupils are equal, round, and reactive to light. No scleral icterus.   Cardiovascular: Normal rate, regular rhythm, normal heart sounds and intact distal pulses.    Pulmonary/Chest: Effort normal and breath sounds normal. No respiratory distress. He has no wheezes. He has no rhonchi. He has no rales.   Abdominal: Soft. Bowel sounds are normal. He exhibits distension. There is no tenderness.   Musculoskeletal: Normal range of motion. He exhibits edema (generalized).    Neurological: He is alert and oriented to person, place, and time.   Skin: Skin is warm and dry.   Psychiatric: He has a normal mood and affect. His behavior is normal. Judgment and thought content normal.   Nursing note and vitals reviewed.      Laboratory:  Immunosuppressants         Stop Route Frequency     cycloSPORINE modified capsule 150 mg      -- Oral 2 times daily     cycloSPORINE modified (NEORAL) 25 MG capsule      03/09 1714       cycloSPORINE modified (NEORAL) 100 MG capsule      03/09 1714       cycloSPORINE modified (NEORAL) 100 MG capsule      03/09 0514       cycloSPORINE modified (NEORAL) 25 MG capsule      03/09 0514          CBC:     Recent Labs  Lab 04/09/18 0515   WBC 3.52*   RBC 2.45*   HGB 8.1*   HCT 25.0*   PLT 56*   *   MCH 33.1*   MCHC 32.4     CMP:     Recent Labs  Lab 04/09/18 0515   GLU 87   CALCIUM 8.9   ALBUMIN 2.7*   PROT 5.7*      K 4.4   CO2 21*      BUN 59*   CREATININE 3.2*   ALKPHOS 129   ALT 10   AST 19   BILITOT 1.3*     Labs within the past 24 hours have been reviewed.    Diagnostic Results:  pertinent imaging reviewed

## 2018-04-09 NOTE — PLAN OF CARE
Problem: Hemodialysis (Adult)  Intervention: Monitor/Manage Fluid Electrolyte/Acid Base Balance  Electrolyte imbalance corrected

## 2018-04-09 NOTE — ASSESSMENT & PLAN NOTE
- Poor PO intake since transplant requiring short course of TPN during previous hospital stay.   - Due to N/V and abdominal pain, multiple attempts were made to place SHANE tube with tube feeding, but patient did not tolerate TF  - Transitioned to TPN 1/16 then again on 2/24 due to poor tolerance of SHANE tube/TF  - prealbumin 7 on 1/15 --> prealbumin 22 on 3/19, monitoring weekly (Mondays)  - EGD performed 1/25 and 3/6, gastroparesis, duodenal ulcer, and congested/erythematous gastric mucosa  - Small bowel through with normal findings.  - Consulted GI for GJ tube placement, but was canceled due to ongoing fevers  - Hold Reglan and Marinol 3/8 for neuro symptoms --> restarted reglan and marinol 3/19, will need to dc marinol before discharge because not covered by insurance.  - set goals for caloric intake with patient, continue nocturnal TPN started 3/20 PM to encourage appetite during the day  - Trying to set small caloric goals daily, for now goal is 300-500 jalen/day. Ultimately needs to consume 700 jalen/day to start weaning TPN.    -met with dietician 4/3/18 to discuss dietary goals.   - TPN stopped on 4/6/18 as intake significantly improving.

## 2018-04-10 VITALS
WEIGHT: 136.88 LBS | HEIGHT: 66 IN | SYSTOLIC BLOOD PRESSURE: 127 MMHG | HEART RATE: 78 BPM | TEMPERATURE: 99 F | RESPIRATION RATE: 18 BRPM | DIASTOLIC BLOOD PRESSURE: 71 MMHG | BODY MASS INDEX: 22 KG/M2 | OXYGEN SATURATION: 96 %

## 2018-04-10 LAB
ALBUMIN SERPL BCP-MCNC: 2.9 G/DL
ALP SERPL-CCNC: 127 U/L
ALT SERPL W/O P-5'-P-CCNC: 12 U/L
ANION GAP SERPL CALC-SCNC: 11 MMOL/L
AST SERPL-CCNC: 22 U/L
BASOPHILS # BLD AUTO: 0.01 K/UL
BASOPHILS NFR BLD: 0.3 %
BILIRUB SERPL-MCNC: 1.3 MG/DL
BUN SERPL-MCNC: 21 MG/DL
CALCIUM SERPL-MCNC: 9.2 MG/DL
CHLORIDE SERPL-SCNC: 102 MMOL/L
CO2 SERPL-SCNC: 27 MMOL/L
CREAT SERPL-MCNC: 2 MG/DL
CYCLOSPORINE BLD LC/MS/MS-MCNC: 196 NG/ML
DIFFERENTIAL METHOD: ABNORMAL
EOSINOPHIL # BLD AUTO: 0.1 K/UL
EOSINOPHIL NFR BLD: 2.6 %
ERYTHROCYTE [DISTWIDTH] IN BLOOD BY AUTOMATED COUNT: 14.1 %
EST. GFR  (AFRICAN AMERICAN): 51 ML/MIN/1.73 M^2
EST. GFR  (NON AFRICAN AMERICAN): 44.1 ML/MIN/1.73 M^2
GLUCOSE SERPL-MCNC: 82 MG/DL
HCT VFR BLD AUTO: 26.6 %
HGB BLD-MCNC: 8.7 G/DL
IMM GRANULOCYTES # BLD AUTO: 0.01 K/UL
IMM GRANULOCYTES NFR BLD AUTO: 0.3 %
LYMPHOCYTES # BLD AUTO: 0.9 K/UL
LYMPHOCYTES NFR BLD: 24.9 %
MAGNESIUM SERPL-MCNC: 1.6 MG/DL
MCH RBC QN AUTO: 33.2 PG
MCHC RBC AUTO-ENTMCNC: 32.7 G/DL
MCV RBC AUTO: 102 FL
MONOCYTES # BLD AUTO: 0.4 K/UL
MONOCYTES NFR BLD: 11.2 %
NEUTROPHILS # BLD AUTO: 2.1 K/UL
NEUTROPHILS NFR BLD: 60.7 %
NRBC BLD-RTO: 0 /100 WBC
PHOSPHATE SERPL-MCNC: 3.3 MG/DL
PLATELET # BLD AUTO: 60 K/UL
PMV BLD AUTO: 12.3 FL
POTASSIUM SERPL-SCNC: 3.7 MMOL/L
PROT SERPL-MCNC: 6 G/DL
RBC # BLD AUTO: 2.62 M/UL
SODIUM SERPL-SCNC: 140 MMOL/L
WBC # BLD AUTO: 3.49 K/UL

## 2018-04-10 PROCEDURE — 63600175 PHARM REV CODE 636 W HCPCS: Performed by: PHYSICIAN ASSISTANT

## 2018-04-10 PROCEDURE — 94664 DEMO&/EVAL PT USE INHALER: CPT

## 2018-04-10 PROCEDURE — 25000003 PHARM REV CODE 250: Performed by: PHYSICIAN ASSISTANT

## 2018-04-10 PROCEDURE — 80053 COMPREHEN METABOLIC PANEL: CPT

## 2018-04-10 PROCEDURE — 84100 ASSAY OF PHOSPHORUS: CPT

## 2018-04-10 PROCEDURE — 25000003 PHARM REV CODE 250: Performed by: NURSE PRACTITIONER

## 2018-04-10 PROCEDURE — 25000003 PHARM REV CODE 250

## 2018-04-10 PROCEDURE — 80158 DRUG ASSAY CYCLOSPORINE: CPT

## 2018-04-10 PROCEDURE — 99900035 HC TECH TIME PER 15 MIN (STAT)

## 2018-04-10 PROCEDURE — 99239 HOSP IP/OBS DSCHRG MGMT >30: CPT | Mod: ,,, | Performed by: NURSE PRACTITIONER

## 2018-04-10 PROCEDURE — 83735 ASSAY OF MAGNESIUM: CPT

## 2018-04-10 PROCEDURE — 85025 COMPLETE CBC W/AUTO DIFF WBC: CPT

## 2018-04-10 RX ORDER — DRONABINOL 2.5 MG/1
2.5 CAPSULE ORAL NIGHTLY
Qty: 30 CAPSULE | Refills: 0 | Status: ON HOLD | OUTPATIENT
Start: 2018-04-10 | End: 2018-06-13 | Stop reason: HOSPADM

## 2018-04-10 RX ORDER — CYCLOSPORINE 25 MG/1
75 CAPSULE, LIQUID FILLED ORAL 2 TIMES DAILY
Qty: 180 CAPSULE | Refills: 5 | Status: ON HOLD | OUTPATIENT
Start: 2018-04-10 | End: 2018-05-04 | Stop reason: HOSPADM

## 2018-04-10 RX ORDER — OXYCODONE HYDROCHLORIDE 10 MG/1
5-10 TABLET ORAL EVERY 4 HOURS PRN
Qty: 40 TABLET | Refills: 0 | Status: SHIPPED | OUTPATIENT
Start: 2018-04-10 | End: 2018-04-10

## 2018-04-10 RX ORDER — DRONABINOL 2.5 MG/1
2.5 CAPSULE ORAL NIGHTLY
Qty: 30 CAPSULE | Refills: 0 | Status: SHIPPED | OUTPATIENT
Start: 2018-04-10 | End: 2018-04-10

## 2018-04-10 RX ORDER — OXYCODONE HYDROCHLORIDE 10 MG/1
5-10 TABLET ORAL EVERY 4 HOURS PRN
Qty: 40 TABLET | Refills: 0 | Status: SHIPPED | OUTPATIENT
Start: 2018-04-10 | End: 2018-04-20 | Stop reason: SDUPTHER

## 2018-04-10 RX ADMIN — OMEPRAZOLE 40 MG: 40 CAPSULE, DELAYED RELEASE ORAL at 07:04

## 2018-04-10 RX ADMIN — LEVOTHYROXINE SODIUM 75 MCG: 75 TABLET ORAL at 05:04

## 2018-04-10 RX ADMIN — URSODIOL 300 MG: 300 CAPSULE ORAL at 09:04

## 2018-04-10 RX ADMIN — OXYCODONE HYDROCHLORIDE 10 MG: 5 TABLET ORAL at 02:04

## 2018-04-10 RX ADMIN — PREDNISONE 5 MG: 5 TABLET ORAL at 09:04

## 2018-04-10 RX ADMIN — BISACODYL 10 MG: 5 TABLET, COATED ORAL at 09:04

## 2018-04-10 RX ADMIN — DOCUSATE SODIUM 100 MG: 100 CAPSULE, LIQUID FILLED ORAL at 09:04

## 2018-04-10 RX ADMIN — ONDANSETRON 8 MG: 8 TABLET, ORALLY DISINTEGRATING ORAL at 11:04

## 2018-04-10 RX ADMIN — LEVETIRACETAM 500 MG: 500 TABLET ORAL at 09:04

## 2018-04-10 RX ADMIN — METOCLOPRAMIDE 10 MG: 10 TABLET ORAL at 05:04

## 2018-04-10 RX ADMIN — OXYCODONE HYDROCHLORIDE 10 MG: 5 TABLET ORAL at 09:04

## 2018-04-10 RX ADMIN — CYCLOSPORINE 150 MG: 100 CAPSULE, LIQUID FILLED ORAL at 09:04

## 2018-04-10 RX ADMIN — OXYCODONE HYDROCHLORIDE 10 MG: 5 TABLET ORAL at 04:04

## 2018-04-10 NOTE — PROGRESS NOTES
Discharge Medication Note:    Hospital Course:  Mr Diana is being discharged following an incredibly complicated and prolonged hospital stay, which included multiple ERCPs for hyperbilirubinemia, N/V and PO intolerance for a prolonged period requiring TPN, and fevers.  For details please see the dc summary.    Met with Jhonny Diana and his mother at discharge to review discharge medications and to update the blue medication card.       Jhonny Diana   Home Medication Instructions NISHI:[id number]    Printed on:04/10/18 4913   Medication Information                      bisacodyl (DULCOLAX) 5 mg EC tablet  Take 2 tablets (10 mg total) by mouth once daily.     cycloSPORINE modified, NEORAL, (NEORAL) 100 MG capsule  Take 1 capsule (100 mg total) by mouth 2 (two) times daily.     cycloSPORINE modified, NEORAL, 25 MG capsule  Take 3 capsules (75 mg total) by mouth 2 (two) times daily.     docusate sodium (COLACE) 100 MG capsule  Take 1 capsule (100 mg total) by mouth 2 (two) times daily.     dronabinol (MARINOL) 2.5 MG capsule  Take 1 capsule (2.5 mg total) by mouth every evening.     ergocalciferol (ERGOCALCIFEROL) 50,000 unit Cap  Take 1 capsule (50,000 Units total) by mouth every 7 days.     levETIRAcetam (KEPPRA) 500 MG Tab  Take 1 tablet (500 mg total) by mouth 2 (two) times daily.     levothyroxine (SYNTHROID) 75 MCG tablet  Take 1 tablet (75 mcg total) by mouth before breakfast.     metoclopramide HCl (REGLAN) 10 MG tablet  Take 1 tablet (10 mg total) by mouth 4 (four) times daily before meals and nightly.     omeprazole (PRILOSEC) 40 MG capsule  Take 1 capsule (40 mg total) by mouth before breakfast.     ondansetron (ZOFRAN-ODT) 8 MG TbDL  Take 1 tablet (8 mg total) by mouth every 8 (eight) hours as needed (nausea).     oxyCODONE (ROXICODONE) 10 mg Tab immediate release tablet  Take 0.5-1 tablets (5-10 mg total) by mouth every 4 (four) hours as needed for Pain.     predniSONE (DELTASONE) 5 MG tablet  Take 1 tablet  (5 mg total) by mouth once daily.     sevelamer carbonate (RENVELA) 800 mg Tab  Take 1 tablet (800 mg total) by mouth 3 (three) times daily with meals.     ursodiol (ACTIGALL) 300 mg capsule  Take 1 capsule (300 mg total) by mouth 2 (two) times daily.         Pt was provided with prescriptions for the following meds: ALL MEDS FILLED EXCEPT OMEPRAZOLE  (refill too soon)    The following medications have been placed on HOLD and should be restarted in the outpatient setting (when appropriate): aspirin (throbocytopenia)    Jhonny Diana verbalized understanding and had the opportunity to ask questions.

## 2018-04-10 NOTE — NURSING
Patient ready for dc to [facility]. Printed AVS reviewed.Blue med card updated/reviewed by PharmD and patient has all needed Rx meds for outpatient use. To followup with labs 4/13/18. Patient instructed to leave right neck dressing intact x 24 hours. Outpatient PT and hemodialysis arranged per . Patient next dialysis scheduled 4/13/18. Patient and mother verbalize understanding of dc instructions. Discharged from [facility] via w/c accompanied by mother

## 2018-04-10 NOTE — DISCHARGE SUMMARY
[facility]  Liver Transplant  Discharge Summary      Patient Name: Jhonny Diana  MRN: [id number]  Admission Date: 2018  Hospital Length of Stay: 89 days  Discharge Date and Time:  04/10/2018 3:52 PM  Attending Physician: Solis Chapa MD   Discharging Provider: Seda Camacho NP  Primary Care Provider: Primary Doctor No  Post-Operative Day: 173     ORGAN:   LIVER  Disease Etiology: Acute Alcoholic Hepatitis  Donor Type:    - Brain Death  CDC High Risk:   No  Donor CMV Status:   Donor CMV Status: Positive  Donor HBcAB:   Negative  Donor HCV Status:   Negative  Whole or Partial: Whole Liver  Biliary Anastomosis: End to End  Arterial Anatomy: Standard    HPI:   Jhonny Diana is a 26 y/o male with past medical history of alcoholic cirrhosis.  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 Found to have peritonitis in November (WBC 5000, 75% PNM) neg for bile leak. He was treated initially with vanc/cefepime. Repeat cell counts  with some improvement (WBC 1400, 45% PNM). He has undergone multiple paracenteses as well as abscess drainage of perihepatic fluid collections and treated with antimicrobial therapy but no positive cultures. Of note, biliary stricture also identified and ERCP performed on 2017 with sphincterotomy and biliary stent placed. Liver tests still have not normalized despite intervention, bilirubin and AP remain elevated. Other pertinent PMH current wound vac in place 2/2 wound infection, malnutrition requiring TPN for short course and ongoing hypoalbuminemia, and seizure activity while on prograf and has since been switched to cyclosporine without reoccurrence.  He presented  to the ER for fever, abdominal pain, and N/V. He reports fever (103) for 1 day prior. Overnight, he developed N/V, reports small amount of green emesis with new left sided pain. He also endorses worsening SOB with exertion. He was scheduled as an outpatient for follow up paracentesis and IR drainage of fluid collection. CXR in ER shows large pleural effusion with subsegmental atelectasis. Infectious work up initiated in ER. His ANC is 900. Broad spectrum antibiotics initiated in ED. At admit, he denied chest pain, palpitations, diarrhea, constipation, back pain, or any sick contacts.    Procedure(s) (LRB):  ERCP (N/A)  ULTRASOUND-ENDOSCOPIC-UPPER (N/A)     Hospital Course:    Unclear cause of ongoing fevers. Multiple infectious, diagnostic testing completed. Taken to OR for ex lap 2/6, loculated fluid collections found, cultures NGTD. Multiple blood cultures with NGTD. Thoracentesis 1/11, 1/18, 1/31, and 2/16, cultures NGTD. Paracentesis 1/11, 1/19, and 2/5, cultures NGTD.  Negative fungal markers/quant gold intermediate. Negative TTE.  IR drainage 1/11, cultures NGTD. Continued with intermittent fevers despite broad spectrum antibiotics that were initially started 1/11, then broadened antibiotics 2/22 from vanc/zosyn to ertapenem. Multiple CT scans of abd obtained that were unremarkable. Tagged WBCs scan 2/27 without source of infection found. Had several episodes of neutropenia requiring neupogen, last given 2/28. 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 Monitoring weekly CMV PCR, which remain undetectable. Chimerism studies obtained 3/1 to assess for GVHD, which was negative, EBV PCR negative, adenovirus negative.     In addition to fevers, he has consistently c/o abdominal pain and N/V. Tbili has remained elevated post-op requiring multiple ERCPs. ERCPs performed this admission on 1/30, 2/19, and 3/9. ERCPs with sludge and stones with  stents placed. Last ERCP 3/9 with metal stent placed into CBD and stones removed.  Ertapenem was d/cd 3/7 due to AMS. Following ERCP 3/9, he was started on cefepime and flagyl for possible cholangitis. Has now remained afebrile since 3/10. He completed a 3 week course (stop dates 3/19 & 3/20) of cefepime/flagyl.  Bilirubin now normalized.  Liver ultrasound obtained 4/10 with biliary stent in place.  Will need repeat ERCP 3 months.    Due to elevated enzymes, he had a liver biopsy 1/23 consistent with cholestatic hepatitis (no cultures obtained during biopsy). GI was consulted for persistent N/V and malnutrition. EGD performed 1/25 and 3/6, EGD 3/6 with normal esophagus congested, erythematous and nodular mucosa in the stomach, one duodenal ulcer with a clean ulcer base (Emir Class III), biopsies taken, negative for CMV and GVHD. Due to N/V and abdominal pain, multiple attempts were made to place SHANE tube with tube feeding, but patient did not tolerate TF. Therefore, he was transitioned to TPN 1/16 then again on 2/24. He also has chronic constipation. Small bowel follow through study with normal findings. Following EGD 1/25, he was started on a PPI, miralax, and Metamucil, but he did not tolerate meds. For appetite stimulation he was trialed on marinol. He also did well with reglan before meals and at bedtime. Appetite has improved, TPN discontinued, remains on marinol and reglan.    Early post-op liver transplant surgery, he was switched to cyclosporine for seizures. During this admission, he was transitioned back to prograf 2/24 with keppra on board. Starting 3/1 he developed stuttering of speech, disorientation. CT head 3/1 and MRI head 3/6 unremarkable. EEG 3/8 with no seizure activity.  Started on rapamune and stress dose steroids 3/10, then ultimately returned to cyclosporine 3/13 and rapamune d/cd same day. Since transitioning back to cyclosporine, his mental status has remained stable.     During  admission, patient was evaluated by KTM for future kidney transplant, and he was deemed to be a suitable candidate once medically stable and cleared. He dialyzes on a M-W-F schedule. He was anuric on admission, but starting producing urine around 3/16.  Now producing >1L per day.  Will be discharged on two day a week schedule, M/F, outpatient.  Possibly showing signs of renal recovery.      Patient stable and ready for discharge.  Will have labs Friday and follow up appointment with hepatologist in the next 2 weeks.    Final Active Diagnoses:    Diagnosis Date Noted POA    Severe protein-calorie malnutrition [E43] 01/11/2018 Yes    Acute renal failure with tubular necrosis [N17.0] 09/28/2017 Yes    Other cytomegaloviral diseases [B25.8] 02/05/2018 No    Liver transplanted [Z94.4] 10/20/2017 Not Applicable     Chronic    Constipation [K59.00] 01/20/2018 Yes    Long-term use of immunosuppressant medication [Z79.899] 10/20/2017 Not Applicable     Chronic    Prophylactic immunotherapy [Z29.8] 10/20/2017 Not Applicable     Chronic    Anemia of chronic disease [D63.8] 11/18/2017 Yes    Biliary stricture of transplanted liver [T86.49, K83.1] 12/07/2017 Yes    Seizure [R56.9] 11/02/2017 Yes    At risk for opportunistic infections [Z91.89] 10/26/2017 Yes    Delayed surgical wound healing [T81.89XA] 11/17/2017 Yes    Hyperphosphatemia [E83.39] 04/04/2018 Yes    Vitamin D deficiency [E55.9] 03/08/2018 No    Nausea and vomiting [R11.2] 01/11/2018 Yes      Problems Resolved During this Admission:    Diagnosis Date Noted Date Resolved POA    PRINCIPAL PROBLEM:  Fever [R50.9] 01/11/2018 01/26/2018 Yes    Fever [R50.9] 01/30/2018 03/26/2018 Yes    Peritonitis [K65.9] 12/08/2017 01/20/2018 Yes    Recurrent pleural effusion on right [J90] 01/11/2018 03/26/2018 Yes    Encephalopathy [G93.40] 03/07/2018 03/14/2018 No    Kidney transplant candidate [Z76.82]  03/27/2018 Not Applicable    Other neutropenia [D70.8]  02/25/2018 03/12/2018 Yes    Abdominal pain [R10.9] 02/11/2018 02/10/2018 Yes    Elevated bilirubin [R17] 02/11/2018 03/04/2018 Yes    Pleural effusion, right [J90] 02/02/2018 02/10/2018 Yes    Elevated liver function tests [R79.89] 01/17/2018 01/24/2018 Yes    ESRD (end stage renal disease) [N18.6] 01/13/2018 01/13/2018 Yes    Neutropenia [D70.9] 01/11/2018 01/26/2018 Yes    Other ascites [R18.8] 01/11/2018 03/12/2018 Yes    Physical deconditioning [R53.81] 12/01/2017 01/25/2018 Yes       Consults         Status Ordering Provider     Inpatient consult to [facility]  Once     Provider:  (Not yet assigned)    Completed JOSÉ MIGUEL DURAN     Inpatient consult to [facility]  Once     Provider:  (Not yet assigned)    Completed VANDANA SAMUEL     Inpatient consult to Gastroenterology  Once     Provider:  (Not yet assigned)    Completed ARVIND MALIK     Inpatient consult to Gastroenterology  Once     Provider:  (Not yet assigned)    Completed VANDANA SAMUEL     Inpatient consult to Infectious Diseases  Once     Provider:  (Not yet assigned)    Completed JOSÉ MIGUEL DURAN     Inpatient consult to Infectious Diseases  Once     Provider:  (Not yet assigned)    Completed OPPENHEIMER, JENNA M.     Inpatient consult to Kidney/Pancreas Transplant Medicine  Once     Provider:  (Not yet assigned)    Completed KATHERINE REY     Inpatient consult to Midline team  Once     Provider:  (Not yet assigned)    Completed VANDANA SAMUEL     Inpatient consult to Nephrology  Once     Provider:  (Not yet assigned)    Completed JOSÉ MIGUEL DURAN     Inpatient consult to Neurology  Once     Provider:  (Not yet assigned)    Completed ARVIND MALIK     Inpatient consult to Registered Dietitian/Nutritionist  Once     Provider:  (Not yet assigned)    Completed JOSÉ MIGUEL DURAN     Inpatient consult to Registered Dietitian/Nutritionist  Once     Provider:  (Not yet assigned)    Completed YARA  ANTON SANCHEZ          Pending Diagnostic Studies:     Procedure Component Value Units Date/Time    Protein, Body Fluid (Reference Lab or Non[facility]) Acescindy; Ascites [[id number]] Collected:  01/11/18 1256    Order Status:  Sent Lab Status:  In process Updated:  01/11/18 1257    Specimen:  Body Fluid         Significant Diagnostic Studies: Labs:   CMP   Recent Labs  Lab 04/09/18  0515 04/10/18  0500    140   K 4.4 3.7    102   CO2 21* 27   GLU 87 82   BUN 59* 21*   CREATININE 3.2* 2.0*   CALCIUM 8.9 9.2   PROT 5.7* 6.0   ALBUMIN 2.7* 2.9*   BILITOT 1.3* 1.3*   ALKPHOS 129 127   AST 19 22   ALT 10 12   ANIONGAP 12 11   ESTGFRAFRICA 28.9* 51.0*   EGFRNONAA 25.0* 44.1*   , CBC   Recent Labs  Lab 04/09/18  0515 04/10/18  0500   WBC 3.52* 3.49*   HGB 8.1* 8.7*   HCT 25.0* 26.6*   PLT 56* 60*    and INR   Lab Results   Component Value Date    INR 1.1 03/12/2018    INR 1.0 02/16/2018    INR 1.2 01/11/2018       The patients clinical status was discussed at multidisplinary rounds, involving transplant surgery, transplant medicine, pharmacy, nursing, nutrition, and social work    Discharged Condition: good    Disposition: Home or Self Care    Follow Up:    Patient Instructions:     Referral to OutPatient  Physical therapy   Referral Priority: Routine Referral Type: Physical Medicine   Referral Reason: Specialty Services Required    Requested Specialty: Physical Therapy    Number of Visits Requested: 1      Activity as tolerated     Notify your health care provider if you experience any of the following:  temperature >100.4     Notify your health care provider if you experience any of the following:  persistent nausea and vomiting or diarrhea     Notify your health care provider if you experience any of the following:  severe uncontrolled pain     Notify your health care provider if you experience any of the following:  redness, tenderness, or signs of infection (pain, swelling, redness, odor or green/yellow  discharge around incision site)     Notify your health care provider if you experience any of the following:  difficulty breathing or increased cough     Notify your health care provider if you experience any of the following:  severe persistent headache     Notify your health care provider if you experience any of the following:  persistent dizziness, light-headedness, or visual disturbances     Notify your health care provider if you experience any of the following:  increased confusion or weakness     Change dressing (specify)   Order Comments: Dressing change: apply hydrocolloid dressing weekly       Medications:  Reconciled Home Medications:      Medication List      START taking these medications    bisacodyl 5 mg EC tablet  Commonly known as:  DULCOLAX  Take 2 tablets (10 mg total) by mouth once daily.     dronabinol 2.5 MG capsule  Commonly known as:  MARINOL  Take 1 capsule (2.5 mg total) by mouth every evening.     metoclopramide HCl 10 MG tablet  Commonly known as:  REGLAN  Take 1 tablet (10 mg total) by mouth 4 (four) times daily before meals and nightly.     omeprazole 40 MG capsule  Commonly known as:  PRILOSEC  Take 1 capsule (40 mg total) by mouth before breakfast.     predniSONE 5 MG tablet  Commonly known as:  DELTASONE  Take 1 tablet (5 mg total) by mouth once daily.     sevelamer carbonate 800 mg Tab  Commonly known as:  RENVELA  Take 1 tablet (800 mg total) by mouth 3 (three) times daily with meals.        CHANGE how you take these medications    * cycloSPORINE modified (NEORAL) 100 MG capsule  Commonly known as:  NEORAL  Take 1 capsule (100 mg total) by mouth 2 (two) times daily.  What changed:  · medication strength  · how much to take     * cycloSPORINE modified (NEORAL) 25 MG capsule  Take 3 capsules (75 mg total) by mouth 2 (two) times daily.  What changed:  You were already taking a medication with the same name, and this prescription was added. Make sure you understand how and when to  take each.     docusate sodium 100 MG capsule  Commonly known as:  COLACE  Take 1 capsule (100 mg total) by mouth 2 (two) times daily.  What changed:  · when to take this  · reasons to take this     oxyCODONE 10 mg Tab immediate release tablet  Commonly known as:  ROXICODONE  Take 0.5-1 tablets (5-10 mg total) by mouth every 4 (four) hours as needed for Pain.  What changed:  when to take this        * This list has 2 medication(s) that are the same as other medications prescribed for you. Read the directions carefully, and ask your doctor or other care provider to review them with you.            CONTINUE taking these medications    ergocalciferol 50,000 unit Cap  Commonly known as:  ERGOCALCIFEROL  Take 1 capsule (50,000 Units total) by mouth every 7 days.     levETIRAcetam 500 MG Tab  Commonly known as:  KEPPRA  Take 1 tablet (500 mg total) by mouth 2 (two) times daily.     levothyroxine 75 MCG tablet  Commonly known as:  SYNTHROID  Take 1 tablet (75 mcg total) by mouth before breakfast.     ondansetron 8 MG Tbdl  Commonly known as:  ZOFRAN-ODT  Take 1 tablet (8 mg total) by mouth every 8 (eight) hours as needed (nausea).     ursodiol 300 mg capsule  Commonly known as:  ACTIGALL  Take 1 capsule (300 mg total) by mouth 2 (two) times daily.        STOP taking these medications    aspirin 81 MG EC tablet  Commonly known as:  ECOTRIN     atovaquone 750 mg/5 mL Susp  Commonly known as:  MEPRON     mirtazapine 7.5 MG Tab  Commonly known as:  REMERON     multivitamin tablet  Commonly known as:  THERAGRAN     pantoprazole 40 MG tablet  Commonly known as:  PROTONIX     sodium bicarbonate 650 MG tablet     thiamine 100 MG tablet          Time spent caring for patient (Greater than 1/2 spent in direct face-to-face contact): > 30 minutes    Seda Camacho NP  Liver Transplant  [facility]

## 2018-04-10 NOTE — PROGRESS NOTES
Discharge Note: Patient tentatively scheduled to be discharged today to [facility] [address] locally with mom Sanjuanita Diana ph# [phone].  The patient appeared to be alert, oriented and communicative. The patient was sitting upright on the couch talking with his mother during assessment.  The mother of the patient appeared alert, oriented and communicative. Both mom and pt appear to have a happy mood and reported to be excited to leave the hospital.  Pt confirmed identifiers. SW discussed discharge plans with the patient and caregiver. Both reported agreement the plan of outpt HD Monday and Friday for 3pm and outpt Rehab. Pt is set up for outpatient HD at [facility] 3pm, SW confirmed the plan with Chano Nov[place] ph[place] [phone] [facility] [place] it has been confirmed that the patient will present for HD on Friday.  There were no further questions or concerns at this time. Patient verbalized understanding and agreement with the information reviewed, social work availability, and how to access available resources if needed. SW remains available.

## 2018-04-10 NOTE — PLAN OF CARE
Problem: Patient Care Overview  Goal: Plan of Care Review  Outcome: Ongoing (interventions implemented as appropriate)  3 1/2 hours dialysis completed, rinsed back the blood, RIGHT IJ Permanent catheter flushed with Normal Saline, both lumens filled with Heparin, capped and taped. NO fluid removed as ordered. Procrit given, tolerated well. Transported from dialysis unit to [facility] per wheelchair via transporter.

## 2018-04-11 DIAGNOSIS — Z94.4 STATUS POST LIVER TRANSPLANT: Primary | ICD-10-CM

## 2018-04-11 LAB
ACID FAST MOD KINY STN SPEC: NORMAL
MYCOBACTERIUM SPEC QL CULT: NORMAL

## 2018-04-12 ENCOUNTER — PATIENT OUTREACH (OUTPATIENT)
Dept: ADMINISTRATIVE | Facility: CLINIC | Age: 28
End: 2018-04-12

## 2018-04-12 LAB — FUNGUS SPEC CULT: NORMAL

## 2018-04-12 NOTE — PATIENT INSTRUCTIONS
Sepsis     To treat sepsis, antibiotics and fluids may by given through an intravenous (IV) line.     Sepsis happens when your body responds with widespread inflammation to a bad infection or bacteremia--the presence of bacteria in your bloodstream. Sepsis can be deadly. Blood pressure may drop and the lungs and kidneys may start to fail. Emergency care for sepsis is crucial.  Risk factors  Those most at risk for sepsis are:  · Infants or older adults  · People who have an illness that weakens their immune system, such as cancer, AIDS, or diabetes  · People being treated with chemotherapy medicines or radiation, which weakens the immune system  · People who have had a transplant  · People with a very severe infection such as pneumonia, meningitis, or a urinary tract infection  When to go to the emergency department (ED)  Sepsis is an emergency. Go to the nearest ED if you have a fever with any of these symptoms:  · Chills and shaking  · Rapid heartbeat and breathing  · Trouble breathing  · Severe nausea or uncontrolled vomiting  · Confusion, disorientation, drowsiness, or dizziness  · Decreased urination  · Severe pain, including in the back or joints   What to expect in the ED  · Blood and urine tests are done to look for bacteria. They also check for organ failure.  · Blood, urine, or sputum cultures may be taken. The samples are sent to a lab. They are placed in a special container. Any bacteria should grow in 24 hours.  · X-rays or other imaging tests may be done.  A person with sepsis will be admitted to the hospital and treated with antibiotics. Treatment may also include oxygen and intravenous fluids.  Date Last Reviewed: 10/1/2016  © 4019-4252 [employer]. [address] [place] [address], [place] [address]. All rights reserved. This information is not intended as a substitute for professional medical care. Always follow your healthcare professional's instructions.

## 2018-04-12 NOTE — PROGRESS NOTES
Please forward this important TCC information to your provider in order to maximize the post discharge care delivery of this patient.    C3 nurse spoke with the mom of Jhonny Diana  for a TCC post hospital discharge follow up call. The patient has a scheduled LIVERTX appointment with Georgina Rogers on 4\23 @ 1530.  Respectfully,  Cassy Mishra RN  [facility]    [email or web address]       Please do not reply to this message, as this inbox is not routinely monitored.

## 2018-04-13 ENCOUNTER — LAB VISIT (OUTPATIENT)
Dept: LAB | Facility: HOSPITAL | Age: 28
End: 2018-04-13
Attending: INTERNAL MEDICINE
Payer: COMMERCIAL

## 2018-04-13 ENCOUNTER — TELEPHONE (OUTPATIENT)
Dept: TRANSPLANT | Facility: CLINIC | Age: 28
End: 2018-04-13

## 2018-04-13 DIAGNOSIS — Z94.4 LIVER REPLACED BY TRANSPLANT: ICD-10-CM

## 2018-04-13 LAB
ALBUMIN SERPL BCP-MCNC: 3.1 G/DL
ALP SERPL-CCNC: 163 U/L
ALT SERPL W/O P-5'-P-CCNC: 14 U/L
ANION GAP SERPL CALC-SCNC: 11 MMOL/L
AST SERPL-CCNC: 27 U/L
BASOPHILS # BLD AUTO: 0.01 K/UL
BASOPHILS NFR BLD: 0.3 %
BILIRUB SERPL-MCNC: 1.6 MG/DL
BUN SERPL-MCNC: 40 MG/DL
CALCIUM SERPL-MCNC: 9.7 MG/DL
CHLORIDE SERPL-SCNC: 104 MMOL/L
CO2 SERPL-SCNC: 24 MMOL/L
CREAT SERPL-MCNC: 4.2 MG/DL
CYCLOSPORINE BLD LC/MS/MS-MCNC: 270 NG/ML
DIFFERENTIAL METHOD: ABNORMAL
EOSINOPHIL # BLD AUTO: 0.1 K/UL
EOSINOPHIL NFR BLD: 3 %
ERYTHROCYTE [DISTWIDTH] IN BLOOD BY AUTOMATED COUNT: 14 %
EST. GFR  (AFRICAN AMERICAN): 20.8 ML/MIN/1.73 M^2
EST. GFR  (NON AFRICAN AMERICAN): 18 ML/MIN/1.73 M^2
GLUCOSE SERPL-MCNC: 78 MG/DL
HCT VFR BLD AUTO: 29.6 %
HGB BLD-MCNC: 9.7 G/DL
IMM GRANULOCYTES # BLD AUTO: 0.02 K/UL
IMM GRANULOCYTES NFR BLD AUTO: 0.5 %
LYMPHOCYTES # BLD AUTO: 0.9 K/UL
LYMPHOCYTES NFR BLD: 25.8 %
MCH RBC QN AUTO: 33.4 PG
MCHC RBC AUTO-ENTMCNC: 32.8 G/DL
MCV RBC AUTO: 102 FL
MONOCYTES # BLD AUTO: 0.3 K/UL
MONOCYTES NFR BLD: 8.2 %
NEUTROPHILS # BLD AUTO: 2.3 K/UL
NEUTROPHILS NFR BLD: 62.2 %
NRBC BLD-RTO: 0 /100 WBC
PLATELET # BLD AUTO: 65 K/UL
PMV BLD AUTO: 11.3 FL
POTASSIUM SERPL-SCNC: 4.7 MMOL/L
PROT SERPL-MCNC: 6.7 G/DL
RBC # BLD AUTO: 2.9 M/UL
SODIUM SERPL-SCNC: 139 MMOL/L
WBC # BLD AUTO: 3.65 K/UL

## 2018-04-13 PROCEDURE — 80053 COMPREHEN METABOLIC PANEL: CPT | Mod: TXP

## 2018-04-13 PROCEDURE — 85025 COMPLETE CBC W/AUTO DIFF WBC: CPT | Mod: TXP

## 2018-04-13 PROCEDURE — 80158 DRUG ASSAY CYCLOSPORINE: CPT | Mod: TXP

## 2018-04-13 PROCEDURE — 36415 COLL VENOUS BLD VENIPUNCTURE: CPT | Mod: NTX

## 2018-04-13 NOTE — TELEPHONE ENCOUNTER
----- Message from Georgina Rogers MD sent at 4/13/2018 12:10 PM CDT -----  Labs stable, no change.

## 2018-04-16 ENCOUNTER — LAB VISIT (OUTPATIENT)
Dept: LAB | Facility: HOSPITAL | Age: 28
End: 2018-04-16
Attending: INTERNAL MEDICINE
Payer: COMMERCIAL

## 2018-04-16 DIAGNOSIS — Z94.4 LIVER REPLACED BY TRANSPLANT: ICD-10-CM

## 2018-04-16 LAB
ALBUMIN SERPL BCP-MCNC: 2.7 G/DL
ALP SERPL-CCNC: 156 U/L
ALT SERPL W/O P-5'-P-CCNC: 12 U/L
ANION GAP SERPL CALC-SCNC: 11 MMOL/L
AST SERPL-CCNC: 26 U/L
BASOPHILS # BLD AUTO: 0.01 K/UL
BASOPHILS NFR BLD: 0.3 %
BILIRUB SERPL-MCNC: 2 MG/DL
BUN SERPL-MCNC: 33 MG/DL
CALCIUM SERPL-MCNC: 8.9 MG/DL
CHLORIDE SERPL-SCNC: 102 MMOL/L
CO2 SERPL-SCNC: 25 MMOL/L
CREAT SERPL-MCNC: 4.1 MG/DL
CYCLOSPORINE BLD LC/MS/MS-MCNC: 283 NG/ML
DIFFERENTIAL METHOD: ABNORMAL
EOSINOPHIL # BLD AUTO: 0.1 K/UL
EOSINOPHIL NFR BLD: 2.6 %
ERYTHROCYTE [DISTWIDTH] IN BLOOD BY AUTOMATED COUNT: 13.6 %
EST. GFR  (AFRICAN AMERICAN): 21.4 ML/MIN/1.73 M^2
EST. GFR  (NON AFRICAN AMERICAN): 18.5 ML/MIN/1.73 M^2
GLUCOSE SERPL-MCNC: 75 MG/DL
HCT VFR BLD AUTO: 29.1 %
HGB BLD-MCNC: 9.7 G/DL
IMM GRANULOCYTES # BLD AUTO: 0.02 K/UL
IMM GRANULOCYTES NFR BLD AUTO: 0.6 %
LYMPHOCYTES # BLD AUTO: 0.8 K/UL
LYMPHOCYTES NFR BLD: 26.3 %
MCH RBC QN AUTO: 33.6 PG
MCHC RBC AUTO-ENTMCNC: 33.3 G/DL
MCV RBC AUTO: 101 FL
MONOCYTES # BLD AUTO: 0.4 K/UL
MONOCYTES NFR BLD: 12.3 %
NEUTROPHILS # BLD AUTO: 1.8 K/UL
NEUTROPHILS NFR BLD: 57.9 %
NRBC BLD-RTO: 0 /100 WBC
PLATELET # BLD AUTO: 69 K/UL
PMV BLD AUTO: 11.9 FL
POTASSIUM SERPL-SCNC: 4.3 MMOL/L
PROT SERPL-MCNC: 6.1 G/DL
RBC # BLD AUTO: 2.89 M/UL
SODIUM SERPL-SCNC: 138 MMOL/L
WBC # BLD AUTO: 3.08 K/UL

## 2018-04-16 PROCEDURE — 80053 COMPREHEN METABOLIC PANEL: CPT | Mod: TXP

## 2018-04-16 PROCEDURE — 80158 DRUG ASSAY CYCLOSPORINE: CPT | Mod: TXP

## 2018-04-16 PROCEDURE — 85025 COMPLETE CBC W/AUTO DIFF WBC: CPT | Mod: TXP

## 2018-04-16 PROCEDURE — 36415 COLL VENOUS BLD VENIPUNCTURE: CPT | Mod: TXP

## 2018-04-17 ENCOUNTER — TELEPHONE (OUTPATIENT)
Dept: TRANSPLANT | Facility: CLINIC | Age: 28
End: 2018-04-17

## 2018-04-17 NOTE — TELEPHONE ENCOUNTER
----- Message from Georgina Rogers MD sent at 4/16/2018 11:24 AM CDT -----  Labs sable, no action needed.

## 2018-04-19 ENCOUNTER — LAB VISIT (OUTPATIENT)
Dept: LAB | Facility: HOSPITAL | Age: 28
End: 2018-04-19
Attending: INTERNAL MEDICINE
Payer: COMMERCIAL

## 2018-04-19 ENCOUNTER — TELEPHONE (OUTPATIENT)
Dept: TRANSPLANT | Facility: CLINIC | Age: 28
End: 2018-04-19

## 2018-04-19 DIAGNOSIS — Z94.4 LIVER REPLACED BY TRANSPLANT: ICD-10-CM

## 2018-04-19 LAB
ALBUMIN SERPL BCP-MCNC: 2.4 G/DL
ALP SERPL-CCNC: 160 U/L
ALT SERPL W/O P-5'-P-CCNC: 10 U/L
ANION GAP SERPL CALC-SCNC: 10 MMOL/L
AST SERPL-CCNC: 24 U/L
BASOPHILS # BLD AUTO: 0.01 K/UL
BASOPHILS NFR BLD: 0.4 %
BILIRUB SERPL-MCNC: 2.4 MG/DL
BUN SERPL-MCNC: 27 MG/DL
CALCIUM SERPL-MCNC: 9 MG/DL
CHLORIDE SERPL-SCNC: 101 MMOL/L
CO2 SERPL-SCNC: 28 MMOL/L
CREAT SERPL-MCNC: 4.7 MG/DL
CYCLOSPORINE BLD LC/MS/MS-MCNC: 197 NG/ML
DIFFERENTIAL METHOD: ABNORMAL
EOSINOPHIL # BLD AUTO: 0.1 K/UL
EOSINOPHIL NFR BLD: 3.3 %
ERYTHROCYTE [DISTWIDTH] IN BLOOD BY AUTOMATED COUNT: 13.3 %
EST. GFR  (AFRICAN AMERICAN): 18.2 ML/MIN/1.73 M^2
EST. GFR  (NON AFRICAN AMERICAN): 15.7 ML/MIN/1.73 M^2
GLUCOSE SERPL-MCNC: 70 MG/DL
HCT VFR BLD AUTO: 27.5 %
HGB BLD-MCNC: 9.1 G/DL
IMM GRANULOCYTES # BLD AUTO: 0.03 K/UL
IMM GRANULOCYTES NFR BLD AUTO: 1.3 %
LYMPHOCYTES # BLD AUTO: 0.8 K/UL
LYMPHOCYTES NFR BLD: 33.3 %
MCH RBC QN AUTO: 33.3 PG
MCHC RBC AUTO-ENTMCNC: 33.1 G/DL
MCV RBC AUTO: 101 FL
MONOCYTES # BLD AUTO: 0.3 K/UL
MONOCYTES NFR BLD: 13.3 %
NEUTROPHILS # BLD AUTO: 1.2 K/UL
NEUTROPHILS NFR BLD: 48.4 %
NRBC BLD-RTO: 0 /100 WBC
PLATELET # BLD AUTO: 85 K/UL
PMV BLD AUTO: 11.5 FL
POTASSIUM SERPL-SCNC: 4.6 MMOL/L
PROT SERPL-MCNC: 6 G/DL
RBC # BLD AUTO: 2.73 M/UL
SODIUM SERPL-SCNC: 139 MMOL/L
WBC # BLD AUTO: 2.4 K/UL

## 2018-04-19 PROCEDURE — 85025 COMPLETE CBC W/AUTO DIFF WBC: CPT | Mod: NTX

## 2018-04-19 PROCEDURE — 36415 COLL VENOUS BLD VENIPUNCTURE: CPT | Mod: TXP

## 2018-04-19 PROCEDURE — 80158 DRUG ASSAY CYCLOSPORINE: CPT | Mod: TXP

## 2018-04-19 PROCEDURE — 80053 COMPREHEN METABOLIC PANEL: CPT | Mod: TXP

## 2018-04-19 RX ORDER — OXYCODONE HYDROCHLORIDE 10 MG/1
5-10 TABLET ORAL EVERY 4 HOURS PRN
Qty: 40 TABLET | Refills: 0 | Status: CANCELLED | OUTPATIENT
Start: 2018-04-19

## 2018-04-19 NOTE — TELEPHONE ENCOUNTER
----- Message from Jenniffer Rollins sent at 4/19/2018 12:37 PM CDT -----  Contact: pt  Returning call to Isabel Gilliam    pls contact pt thanks

## 2018-04-19 NOTE — TELEPHONE ENCOUNTER
----- Message from Georgina Rogers MD sent at 4/19/2018  4:48 PM CDT -----  I do not have an electronic token for narcotics. I can leave a triplet script with the clinic staffs if they want to  tomorrow. I do not see pain clinic appointment. He has an appointment with [facility], I am not sure they will prescribe narcotics. I do not like to prescribe narcotics.     NL

## 2018-04-19 NOTE — TELEPHONE ENCOUNTER
----- Message from Carley Mesa sent at 4/19/2018  8:53 AM CDT -----  Contact: Pt  Pt has questions regarding his Oxycodone medication    Pt contact number [phone]  Thanks

## 2018-04-20 ENCOUNTER — TELEPHONE (OUTPATIENT)
Dept: TRANSPLANT | Facility: CLINIC | Age: 28
End: 2018-04-20

## 2018-04-20 ENCOUNTER — TELEPHONE (OUTPATIENT)
Dept: HEPATOLOGY | Facility: CLINIC | Age: 28
End: 2018-04-20

## 2018-04-20 ENCOUNTER — HOSPITAL ENCOUNTER (OUTPATIENT)
Dept: RADIOLOGY | Facility: HOSPITAL | Age: 28
Discharge: HOME | End: 2018-04-20
Attending: INTERNAL MEDICINE
Payer: COMMERCIAL

## 2018-04-20 DIAGNOSIS — R18.8 ABDOMINAL FLUID COLLECTION: Primary | ICD-10-CM

## 2018-04-20 DIAGNOSIS — Z94.4 STATUS POST LIVER TRANSPLANT: ICD-10-CM

## 2018-04-20 DIAGNOSIS — G89.29 CHRONIC ABDOMINAL PAIN: Primary | ICD-10-CM

## 2018-04-20 DIAGNOSIS — Z94.4 STATUS POST LIVER TRANSPLANT: Primary | ICD-10-CM

## 2018-04-20 DIAGNOSIS — Z94.4 LIVER REPLACED BY TRANSPLANT: ICD-10-CM

## 2018-04-20 DIAGNOSIS — R10.9 CHRONIC ABDOMINAL PAIN: Primary | ICD-10-CM

## 2018-04-20 PROCEDURE — 76705 ECHO EXAM OF ABDOMEN: CPT | Mod: 26,XS,NTX, | Performed by: RADIOLOGY

## 2018-04-20 PROCEDURE — 93975 VASCULAR STUDY: CPT | Mod: TC

## 2018-04-20 PROCEDURE — 93975 VASCULAR STUDY: CPT | Mod: 26,NTX,, | Performed by: RADIOLOGY

## 2018-04-20 RX ORDER — OXYCODONE HYDROCHLORIDE 10 MG/1
5-10 TABLET ORAL EVERY 4 HOURS PRN
Qty: 60 TABLET | Refills: 0 | Status: ON HOLD | OUTPATIENT
Start: 2018-04-20 | End: 2018-05-04

## 2018-04-20 NOTE — TELEPHONE ENCOUNTER
----- Message from Georgina Rogers MD sent at 4/19/2018  1:46 PM CDT -----  Bili: 2.4, please obtain post-transplant protocol ultrasound. CsA: 197, no action needed.

## 2018-04-20 NOTE — TELEPHONE ENCOUNTER
----- Message from Georgina Rogers MD sent at 4/20/2018  4:49 PM CDT -----  New development of fluid collection adjacent to right hepatic lobe. Discussed with Dr. Patel. If patient is asymptomatic, no fever/chills, then watchful waiting. Will obtain CT abd non-con and review with IR if this is something which needs to be sampled or drained. If patient develops fever/chills, any signs of sepsis, patient needs to come to ER anytime.

## 2018-04-20 NOTE — TELEPHONE ENCOUNTER
New development of fluid collection adjacent to right hepatic lobe. Discussed with Dr. Patel. If patient is asymptomatic, no fever/chills, then watchful waiting. Will obtain CT abd non-con and review with IR if this is something which needs to be sampled or drained. If patient develops fever/chills, any signs of sepsis, patient needs to come to ER anytime.

## 2018-04-20 NOTE — TELEPHONE ENCOUNTER
----- Message from Georgina Rogers MD sent at 4/18/2018  5:09 PM CDT -----  I think we need to see how he is doing in terms of mobility/frailty for KTM to determine kidney transplant listing. He has been very frail. He has time until August.    But, if mother has concerns about low urine output etc, they have to d/w Nephrology with at dialysis place. We will not be able to address his kidney issues.    NL  ----- Message -----  From: Isabel Gilliam RN  Sent: 4/18/2018   4:39 PM  To: Georgina Rogers MD    Yes he is followed by Nephrology with [facility] Dr. Peterson, did you want a KTM appt scheduled?    Due to insurance issues he can only see Gen nephrology with dialysis  ----- Message -----  From: Georgina Rogers MD  Sent: 4/18/2018   8:16 AM  To: Isabel Gilliam RN    Ok. He needs to see Nephrology. Please schedule Nephrology clinic appointment. What I understand from DC summary is he is getting hemodialysis Mondays/Fridays with Nephrology.     Please repeat labs tomorrow.     NL  ----- Message -----  From: Isabel Gilliam RN  Sent: 4/17/2018   4:27 PM  To: Georgina Rogers MD    Pt bili is slowly on the rise. Pt mother wants repeat labs again this week.  Pt urine production is trending down as well was about 600-700 ml daily down to 150 ml  Mother is very concerned  ----- Message -----  From: Georgina Rogers MD  Sent: 4/16/2018  11:24 AM  To: Isabel Gilliam RN, #    Labs sable, no action needed.

## 2018-04-20 NOTE — TELEPHONE ENCOUNTER
Pt has been notified will need CT scan . Will try to schedule Monday 4/23/18 before clinic appt. Advised if pt gets symptomatic to report to ER. Pt mother agreed with plan

## 2018-04-23 ENCOUNTER — OFFICE VISIT (OUTPATIENT)
Dept: TRANSPLANT | Facility: CLINIC | Age: 28
End: 2018-04-23
Payer: COMMERCIAL

## 2018-04-23 ENCOUNTER — TELEPHONE (OUTPATIENT)
Dept: TRANSPLANT | Facility: CLINIC | Age: 28
End: 2018-04-23

## 2018-04-23 ENCOUNTER — HOSPITAL ENCOUNTER (OUTPATIENT)
Dept: RADIOLOGY | Facility: HOSPITAL | Age: 28
Discharge: HOME | End: 2018-04-23
Attending: INTERNAL MEDICINE
Payer: COMMERCIAL

## 2018-04-23 VITALS
HEART RATE: 84 BPM | WEIGHT: 139.13 LBS | OXYGEN SATURATION: 97 % | RESPIRATION RATE: 16 BRPM | HEIGHT: 66 IN | SYSTOLIC BLOOD PRESSURE: 142 MMHG | TEMPERATURE: 98 F | DIASTOLIC BLOOD PRESSURE: 95 MMHG | BODY MASS INDEX: 22.36 KG/M2

## 2018-04-23 DIAGNOSIS — F10.21 ALCOHOL USE DISORDER, SEVERE, IN EARLY REMISSION: ICD-10-CM

## 2018-04-23 DIAGNOSIS — Z94.4 STATUS POST LIVER TRANSPLANT: ICD-10-CM

## 2018-04-23 DIAGNOSIS — Z29.89 PROPHYLACTIC IMMUNOTHERAPY: Chronic | ICD-10-CM

## 2018-04-23 DIAGNOSIS — K83.1 BILIARY STRICTURE OF TRANSPLANTED LIVER: ICD-10-CM

## 2018-04-23 DIAGNOSIS — Z79.60 LONG-TERM USE OF IMMUNOSUPPRESSANT MEDICATION: Chronic | ICD-10-CM

## 2018-04-23 DIAGNOSIS — T86.49 BILIARY STRICTURE OF TRANSPLANTED LIVER: ICD-10-CM

## 2018-04-23 DIAGNOSIS — Z48.23 ENCOUNTER FOR AFTERCARE FOLLOWING LIVER TRANSPLANT: Primary | ICD-10-CM

## 2018-04-23 DIAGNOSIS — Z94.4 S/P LIVER TRANSPLANT: Chronic | ICD-10-CM

## 2018-04-23 PROCEDURE — 74176 CT ABD & PELVIS W/O CONTRAST: CPT | Mod: TC,TXP

## 2018-04-23 PROCEDURE — 99215 OFFICE O/P EST HI 40 MIN: CPT | Mod: NTX,S$GLB,, | Performed by: INTERNAL MEDICINE

## 2018-04-23 PROCEDURE — 99999 PR PBB SHADOW E&M-EST. PATIENT-LVL IV: CPT | Mod: PBBFAC,TXP,, | Performed by: INTERNAL MEDICINE

## 2018-04-23 PROCEDURE — 74176 CT ABD & PELVIS W/O CONTRAST: CPT | Mod: 26,TXP,, | Performed by: RADIOLOGY

## 2018-04-23 RX ORDER — AMOXICILLIN AND CLAVULANATE POTASSIUM 875; 125 MG/1; MG/1
1 TABLET, FILM COATED ORAL EVERY 12 HOURS
Qty: 20 TABLET | Refills: 0 | Status: ON HOLD | OUTPATIENT
Start: 2018-04-23 | End: 2018-05-04 | Stop reason: HOSPADM

## 2018-04-23 NOTE — PATIENT INSTRUCTIONS
- start Augmentin twice daily  - if you have high fever, feeling very sick, come to ER immediately or call us  - will talk to GI physician and schedule your ERCP/EUS  - continue cyclosporin same dose and other medicines

## 2018-04-23 NOTE — LETTER
April 23, 2018                     [facility]  [address]  [facility] [address]  Phone: [phone]   Patient: Jhonny Diana   MR Number: [id number]   YOB: 1990   Date of Visit: 4/23/2018       Dear      Thank you for referring Jhonny Diana to me for evaluation. Attached you will find relevant portions of my assessment and plan of care.    If you have questions, please do not hesitate to call me. I look forward to following Jhonny Diana along with you.    Sincerely,    Georgina Rogers MD    Enclosure    If you would like to receive this communication electronically, please contact [email or web address] or [phone] to request [facility] access.    [facility] is a tool which provides read-only access to select patient information with whom you have a relationship. Its easy to use and provides real time access to review your patients record including encounter summaries, notes, results, and demographic information.    If you feel you have received this communication in error or would no longer like to receive these types of communications, please e-mail [email or web address]

## 2018-04-23 NOTE — TELEPHONE ENCOUNTER
----- Message from Georgina Rogers MD sent at 4/23/2018  5:17 PM CDT -----  Regarding: Biliary anastomotic stricture, bilirubin going up  Hi,    I just saw this patient in clinic. He is post OLT, hx of biliary anastomotic stricture/sludge, had stents a few times, last one was metal stent in March.    His alkaline phos and bilirubin are trending up slowly. No cholangitis. I started him on prophy antibiotics. He had CT today, was mainly to look at fluid collections.     I think he will need repeat ERCP and balloon sweep again. Or if you want MRCP or EUS first to look at. I do not think it is rejection or sthelse.    Please let me know what you think and we can schedule the appropriate procedures. I ccnicholas Hall, his txp coordinator and Dr. Patel from [facility].    Thank you.    Georgina

## 2018-04-23 NOTE — PROGRESS NOTES
Transplant Hepatology  Liver Transplant Recipient Follow-up    Transplant Date: 10/19/2017  UNOS Native Liver Dx: Acute Alcoholic Hepatitis    Mr. Diana is here for follow up of his liver transplant.  He is accompanied by his mother.      ORGAN: LIVER  Whole or Partial: whole liver  Donor Type:  - brain death  CDC High Risk: no  Donor CMV Status: Positive  Donor HCV Status: Negative  Donor HBcAb: Negative  Biliary Anastomosis: end to end  Arterial Anatomy: standard  IVC reconstruction: end to end ivc  Portal vein status: patent    The patient was admitted for acute alcoholic hepatitis complicated by LAURA and underwent OLT on 10/19/2017.  The patient has had a complicated post-operative course.  The patient required HD and had a brief period of increased UOP from -2017 without need for HD.  Urine output decreased and currently is maintained on HD every MWF.  Patient has also had issues with recurrent infection and required 3 post-operative admissions with fever and leukocytosis.  The patient has undergone multiple paracenteses as well as abscess drainage of perihepatic fluid collections.  Treated with antimicrobial therapy but no positive cultures.  The patient continues to struggle with volume overload.  Biliary stricture also identified and ERCP performed on 2017 with sphincterotomy and biliary stent placed.  Liver tests still have not normalized despite intervention.  Bilirubin and AP remain elevated.      The patient has a woundvac for wound infection which required drainage and antimicrobial therapy.  Placed on TPN for a short course due to malnutrition and ongoing hypoalbuminemia.  The patient had seizure activity while on prograf and has been switched to cyclosporine without recurrent episodes.  He remains off of MMF due to recurrent infections.      Subjective:     Interval History:   The patient was last seen in transplant clinic on 2017 by Dr. Rogers.  Complicated post-operative  "course as documented above.      IS is currently cyclosporine 100/100.  MMF held for recurrent infections and leukopenia.      The patient's bilirubin and AP remain abnormal. Liver US yesterday with complex perihepatic fluid collection of 3.9cm along with complex ascites.    The patient continues to complain of abdominal pain.  Reports pain associated with woundvac that is described as sharp and "electric" as well as RUQ pain radiating to back which has been more persistent following transplant.  He continues to require narcotic medication.      The patient continues on HD on MWF.  He notes increased urine output.       Mother expresses concern for altered mental status with patient exhibiting significant forgetfulness as well as jerking of extremities.      Review of Systems   Constitutional: Positive for fatigue. Negative for activity change, appetite change, chills and unexpected weight change.   HENT: Negative for hearing loss and sore throat.    Eyes: Negative for visual disturbance.   Respiratory: Negative for shortness of breath.    Cardiovascular: Positive for leg swelling. Negative for chest pain.   Gastrointestinal: Positive for abdominal distention. Negative for abdominal pain, nausea and vomiting.   Musculoskeletal: Negative for gait problem.   Skin: Negative for rash.   Neurological: Negative for weakness and headaches.   Hematological: Negative for adenopathy. Does not bruise/bleed easily.   Psychiatric/Behavioral: Negative for confusion.       Objective:     Physical Exam   Constitutional: He is oriented to person, place, and time. He appears well-developed. No distress.   Patient sitting in wheelchair   HENT:   Head: Normocephalic and atraumatic.   Mouth/Throat: Oropharynx is clear and moist.   Eyes: Conjunctivae are normal. Pupils are equal, round, and reactive to light. Scleral icterus is present.   Neck: Normal range of motion. Neck supple. No thyromegaly present.   Cardiovascular: Normal rate, " regular rhythm and normal heart sounds.  Exam reveals no gallop and no friction rub.    No murmur heard.  Pulmonary/Chest: Effort normal and breath sounds normal. No respiratory distress. He has no wheezes. He has no rales.   Abdominal: Soft. Bowel sounds are normal. He exhibits distension. There is no tenderness.   woundvac in place    Musculoskeletal: Normal range of motion. He exhibits edema (1+ BLE edema).   Lymphadenopathy:     He has no cervical adenopathy.   Neurological: He is alert and oriented to person, place, and time. No cranial nerve deficit.   Skin: Skin is warm and dry. No erythema.   Psychiatric: He has a normal mood and affect. His behavior is normal.   Vitals reviewed.    Lab Results   Component Value Date    BILITOT 2.8 (H) 04/23/2018    AST 30 04/23/2018    ALT 10 04/23/2018    ALKPHOS 187 (H) 04/23/2018    CREATININE 5.1 (H) 04/23/2018    ALBUMIN 2.7 (L) 04/23/2018     Lab Results   Component Value Date    WBC 2.10 (L) 04/23/2018    HGB 9.5 (L) 04/23/2018    HCT 29.2 (L) 04/23/2018    HCT 28 (L) 10/20/2017     (L) 04/23/2018     Lab Results   Component Value Date    TACROLIMUS <1.5 (L) 03/04/2018    CYCLOSPORINE 252 04/23/2018    SIROLIMUSLEV 8.6 03/14/2018       Assessment/Plan:   28yo male s/p OLT with complicated course including abnormal liver tests, volume overload, renal insufficiency - on dialysis, biliary anastomotic strictures and concern for intra-abdominal infections.    Since hospital discharge last week, bilirubin and alkaline phos slowly trending up. - likely biliary obstruction. Last time ERCP - had metal stent placed, had sludge before, still on UDCA  CT non-con reviewed with  from IR - difficult to say abscess due to lack of contrast but stable ascites, no new fluid collections.      Recommendation(s):  - likely biliary obstruction, will discuss with [facility] for ERCP  - start Augmentin twice daily as prophy for cholangitis  - if you have high fever, feeling very  sick, come to ER immediately or call us  - will talk to GI physician and schedule your ERCP/EUS  - continue cyclosporine same dose and other medicines    Georgina Rogers MD           [facility] Patient Status  Functional Status: 50% - Requires considerable assistance and frequent medical care  Physical Capacity: Limited Mobility

## 2018-04-24 ENCOUNTER — OFFICE VISIT (OUTPATIENT)
Dept: SPINE | Facility: CLINIC | Age: 28
End: 2018-04-24
Attending: ANESTHESIOLOGY
Payer: COMMERCIAL

## 2018-04-24 ENCOUNTER — TELEPHONE (OUTPATIENT)
Dept: GASTROENTEROLOGY | Facility: CLINIC | Age: 28
End: 2018-04-24

## 2018-04-24 ENCOUNTER — TELEPHONE (OUTPATIENT)
Dept: ENDOSCOPY | Facility: HOSPITAL | Age: 28
End: 2018-04-24

## 2018-04-24 VITALS
HEART RATE: 88 BPM | DIASTOLIC BLOOD PRESSURE: 88 MMHG | WEIGHT: 139 LBS | HEIGHT: 66 IN | SYSTOLIC BLOOD PRESSURE: 142 MMHG | BODY MASS INDEX: 22.34 KG/M2 | TEMPERATURE: 97 F

## 2018-04-24 DIAGNOSIS — Z29.89 PROPHYLACTIC IMMUNOTHERAPY: Chronic | ICD-10-CM

## 2018-04-24 DIAGNOSIS — N17.0 ACUTE RENAL FAILURE WITH TUBULAR NECROSIS: ICD-10-CM

## 2018-04-24 DIAGNOSIS — Z94.4 S/P LIVER TRANSPLANT: Primary | Chronic | ICD-10-CM

## 2018-04-24 DIAGNOSIS — K83.1 BILIARY STRICTURE OF TRANSPLANTED LIVER: ICD-10-CM

## 2018-04-24 DIAGNOSIS — T86.49 BILIARY STRICTURE OF TRANSPLANTED LIVER: ICD-10-CM

## 2018-04-24 PROCEDURE — 99244 OFF/OP CNSLTJ NEW/EST MOD 40: CPT | Mod: S$GLB,,, | Performed by: ANESTHESIOLOGY

## 2018-04-24 PROCEDURE — 99999 PR PBB SHADOW E&M-EST. PATIENT-LVL IV: CPT | Mod: PBBFAC,,, | Performed by: ANESTHESIOLOGY

## 2018-04-24 NOTE — LETTER
April 24, 2018      Georgina Rogers MD  [address]  [facility] [address]           [facility]  [address], [address]  [facility] [address]  Phone: [phone]  Fax: [phone]          Patient: Jhonny Diana   MR Number: [id number]   YOB: 1990   Date of Visit: 4/24/2018       Dear Dr. Georgina Rogers:    Thank you for referring Jhonny Diana to me for evaluation. Attached you will find relevant portions of my assessment and plan of care.    If you have questions, please do not hesitate to call me. I look forward to following Jhonny Diana along with you.    Sincerely,    Jodi Vitale MD    Enclosure  CC:  No Recipients    If you would like to receive this communication electronically, please contact [email or web address] or [phone] to request more information on [facility] access.    For providers and/or their staff who would like to refer a patient to [facility], please contact us through our one-stop-shop provider referral line, [facility], at [phone].    If you feel you have received this communication in error or would no longer like to receive these types of communications, please e-mail [email or web address]

## 2018-04-24 NOTE — TELEPHONE ENCOUNTER
Spoke with patient's mother. ERCP scheduled 4/30 at 9:30a. Reviewed prep instructions. Ms Diana verbalized understanding.

## 2018-04-24 NOTE — TELEPHONE ENCOUNTER
Spoke with patient about ERCP scheduled 4/30/18 at 0930.  Instructions sent to patient's [facility].

## 2018-04-24 NOTE — PROGRESS NOTES
Chronic Pain - New Consult    Referring Physician: Georgina Rogers MD    Chief Complaint:   Chief Complaint   Patient presents with    Abdominal Pain        SUBJECTIVE: Disclaimer: This note has been generated using voice-recognition software. There may be typographical errors that have been missed during proof-reading    Initial encounter:    Jhonny Diana  26 yo male with PMHx of OLT with complicated course including renal failure (now dialyyzes x2 times per week), malnutrition, multiple paracentesis, issues with recurrent infections and abscess drainages presents to the clinic for the evaluation of abdominal pain. The pain started 8 months ago following status post liver transplant and symptoms have been worsening.  He is s/p OLT transplant in October of 2017 and reports ongoing abdominal pain since this.  The onsent was following his surgery.  The pain is located bilaterally below the umbilicus and radiates around his flanks.  The pain is described as aching and stabbing.  Alleviating factors are pain medication, exacerbating factors are activity.       He has history of anastomotic stricture/sludge, and multiple stent placements with the last in March of this year.  He was started on prophylactic antibiotic therapy 04.23.18.    Brief history:    Pain Description:    The pain is located in the abdominal area and radiates to the both hips and middle to lower back.      At BEST  4/10     At WORST  10/10 on the WORST day.      On average pain is rated as 7/10.     Today the pain is rated as 6/10    The pain is described as aching, pulsating, shooting, stabbing and throbbing      Symptoms interfere with daily activity and sleeping.     Exacerbating factors: Standing, Bending, Touching, Coughing/Sneezing, Flexing, Lifting and Getting out of bed/chair.      Mitigating factors heat, laying down, medications, physical therapy, rest and sitting.     Patient denies night fever/night sweats, urinary incontinence, bowel  incontinence, significant weight loss, significant motor weakness and loss of sensations.  Patient denies any suicidal or homicidal ideations    Pain Medications:   Current:  Oxycodone 10 mg q4h prn (patient reports he takes approximately q4h)    Tried in Past:  NSAIDs -Never  TCA -Never  SNRI -Never  Anti-convulsants -Never  Muscle Relaxants -Never  Opioids-Never    Physical Therapy/Home Exercise: yes       report:  Reviewed and consistent with medication use as prescribed.    Pain Procedures:   None    Chiropractor -never  Acupuncture - never  TENS unit -never  Spinal decompression -never  Joint replacement -never    Imaging:     Impression CT Abdomen and Pelvis      Postsurgical changes status post liver transplantation without interval detrimental change.  Large volume of ascites, splenomegaly, and significant anasarca remains stable.    Stable, large volume right-sided pleural effusion with associated compressive atelectasis of the adjacent lung.  There has been interval resolution of a left-sided, small pleural effusion.    Stable, small pericardial effusion.    Biliary stent in place.    Further findings as above.    Electronically signed by resident: Prosper Valerio  Date: 04/23/2018  Time: 09:05    Electronically signed by: William Liz MD  Date: 04/23/2018  Time: 09:49       Impression US Liver Transplant 4.10.18      Satisfactory Doppler ultrasound of the transplant liver.    Slight interval enlargement of a complex fluid collection adjacent to the left hepatic lobe.    Biliary stent remains in place.    Small volume ascites and right pleural effusion.    Electronically signed by resident: Rl Saini  Date: 04/10/2018  Time: 14:12    Electronically signed by: Rajesh Cai MD  Date: 04/10/2018  Time: 14:29         Past Medical History:   Diagnosis Date    Alcoholic hepatitis with ascites     Alcoholic hepatitis with ascites     History of hematemesis 9/28/2017    Hypertension     Renal  disorder      Past Surgical History:   Procedure Laterality Date    APPENDECTOMY      ERCP      ESOPHAGOGASTRODUODENOSCOPY      LIVER TRANSPLANT       Social History     Social History    Marital status: Single     Spouse name: N/A    Number of children: N/A    Years of education: N/A     Occupational History    Not on file.     Social History Main Topics    Smoking status: Former Smoker     Packs/day: 1.00     Years: 10.00     Types: Cigarettes     Start date: 2/19/2017    Smokeless tobacco: Never Used    Alcohol use No      Comment: a fifth of liquor daily for years, cut back over last 2 months    Drug use: No    Sexual activity: Not on file     Other Topics Concern    Not on file     Social History Narrative    No narrative on file     Family History   Problem Relation Age of Onset    No Known Problems Mother        Review of patient's allergies indicates:   Allergen Reactions    Bactrim [sulfamethoxazole-trimethoprim] Other (See Comments)     Mookie Trell Syndrome       Current Outpatient Prescriptions   Medication Sig    amoxicillin-clavulanate 875-125mg (AUGMENTIN) 875-125 mg per tablet Take 1 tablet by mouth every 12 (twelve) hours.    bisacodyl (DULCOLAX) 5 mg EC tablet Take 2 tablets (10 mg total) by mouth once daily.    cycloSPORINE modified, NEORAL, (NEORAL) 100 MG capsule Take 1 capsule (100 mg total) by mouth 2 (two) times daily.    cycloSPORINE modified, NEORAL, 25 MG capsule Take 3 capsules (75 mg total) by mouth 2 (two) times daily.    dronabinol (MARINOL) 2.5 MG capsule Take 1 capsule (2.5 mg total) by mouth every evening.    ergocalciferol (ERGOCALCIFEROL) 50,000 unit Cap Take 1 capsule (50,000 Units total) by mouth every 7 days.    levETIRAcetam (KEPPRA) 500 MG Tab Take 1 tablet (500 mg total) by mouth 2 (two) times daily.    levothyroxine (SYNTHROID) 75 MCG tablet Take 1 tablet (75 mcg total) by mouth before breakfast.    metoclopramide HCl (REGLAN) 10 MG tablet Take 1  "tablet (10 mg total) by mouth 4 (four) times daily before meals and nightly.    omeprazole (PRILOSEC) 40 MG capsule Take 1 capsule (40 mg total) by mouth before breakfast.    ondansetron (ZOFRAN-ODT) 8 MG TbDL Take 1 tablet (8 mg total) by mouth every 8 (eight) hours as needed (nausea).    oxyCODONE (ROXICODONE) 10 mg Tab immediate release tablet Take 0.5-1 tablets (5-10 mg total) by mouth every 4 (four) hours as needed for Pain.    sevelamer carbonate (RENVELA) 800 mg Tab Take 1 tablet (800 mg total) by mouth 3 (three) times daily with meals.    ursodiol (ACTIGALL) 300 mg capsule Take 1 capsule (300 mg total) by mouth 2 (two) times daily.     No current facility-administered medications for this visit.        REVIEW OF SYSTEMS:    GENERAL:  No weight loss, malaise or fevers.  HEENT:   No recent changes in vision or hearing  NECK:  Negative for lumps, no difficulty with swallowing.  RESPIRATORY:  Negative for cough, wheezing or shortness of breath, patient denies any recent URI.  CARDIOVASCULAR:  Negative for chest pain, leg swelling or palpitations.  GI:  Positive for abdominal discomfort (T10-12 distrubution bilaterally), blood in stools or black stools or change in bowel habits.  MUSCULOSKELETAL:  Lower back pain  SKIN:  Negative for lesions, rash, and itching.  PSYCH:  No mood disorder or recent psychosocial stressors.  Patients sleep is not disturbed secondary to pain.  HEMATOLOGY/LYMPHOLOGY:  Negative for prolonged bleeding, bruising easily or swollen nodes.  Patient is not currently taking any anti-coagulants  ENDO: No history of diabetes or thyroid dysfunction  NEURO:   No history of headaches, syncope, paralysis, seizures or tremors.  All other reviewed and negative other than HPI.    OBJECTIVE:    Ht 5' 6" (1.676 m)   Wt 63 kg (139 lb)   BMI 22.44 kg/m²     PHYSICAL EXAMINATION:    GENERAL: Well appearing, in no acute distress, alert and oriented x3.  PSYCH:  Mood and affect appropriate.  SKIN: Skin " color, texture, turgor normal, no rashes or lesions.  HEAD/FACE:  Normocephalic, atraumatic. Cranial nerves grossly intact.  NECK: No pain to palpation over the cervical paraspinous muscles. Spurling Negative. No pain with neck flexion, extension, or lateral flexion.   CV: RRR with palpation of the radial artery.  PULM: No evidence of respiratory difficulty, symmetric chest rise.  GI:  Protuberant, Soft and non-tender.    BACK: Straight leg raising in the supine position is negative to radicular pain. No pain to palpation over the facet joints of the lumbar spine or spinous processes. Normal range of motion without pain reproduction.  EXTREMITIES: Peripheral joint ROM is full and pain free without obvious instability or laxity in all four extremities. No deformities, edema, or skin discoloration. Good capillary refill.  MUSCULOSKELETAL: Shoulder, hip, and knee provocative maneuvers are negative.  There is no pain with palpation over the sacroiliac joints bilaterally.  FABERs test is negative.  FADIRs test is negative.   Bilateral upper and lower extremity strength is normal and symmetric.  No atrophy or tone abnormalities are noted.  NEURO: Bilateral upper and lower extremity coordination and muscle stretch reflexes are physiologic and symmetric.  Plantar response are downgoing. No clonus.  No loss of sensation is noted.  GAIT: normal.    Lab Results   Component Value Date    WBC 2.10 (L) 04/23/2018    HGB 9.5 (L) 04/23/2018    HCT 29.2 (L) 04/23/2018    MCV 99 (H) 04/23/2018     (L) 04/23/2018     BMP  Lab Results   Component Value Date     04/23/2018    K 5.0 04/23/2018     04/23/2018    CO2 25 04/23/2018    BUN 25 (H) 04/23/2018    CREATININE 5.1 (H) 04/23/2018    CALCIUM 9.7 04/23/2018    ANIONGAP 11 04/23/2018    ESTGFRAFRICA 16.4 (A) 04/23/2018    EGFRNONAA 14.2 (A) 04/23/2018       ASSESSMENT: 27 y.o. year old male with abdominal pain, consistent with biliary stricture/sludge    Encounter  Diagnoses   Name Primary?    S/P liver transplant Yes    Prophylactic immunotherapy     Acute renal failure with tubular necrosis     Biliary stricture of transplanted liver        PLAN:   1.  May continue opioid therapy at discretion of primary prescribing physician for acute pain.  Given addiction history recommend caution in consideration of opioids for chronic pain, however, given potential acute sources of pain would recommend to continue therapy at this time with post operative courses of painful procedures.  2.  Will consider gabapentin 300 mg tab following dialysis on Monday and Friday following ERCP with gradual escalation  3.  RTC in 2 weeks following ERCP.    The above plan and management options were discussed at length with patient. Patient is in agreement with the above and verbalized understanding. It will be communicated with the referring physician via electronic record, fax, or mail.    Santos Winston MD  Anesthesiology  [phone]   PGY-IV    I reviewed and edited the  resident's note, I conducted my own interview and physical examination and agree with the findings.      Jodi Vitale 04/24/2018

## 2018-04-24 NOTE — TELEPHONE ENCOUNTER
----- Message from Dionicio Hickman MD sent at 4/24/2018 10:39 AM CDT -----  Regarding: FW: Biliary anastomotic stricture, bilirubin going up  Schedule ERCP  Dionicio Hickman MD  ----- Message -----  From: Georgina Rogers MD  Sent: 4/23/2018   5:17 PM  To: Sean Patel MD, #  Subject: Biliary anastomotic stricture, bilirubin goi#    Hi,    I just saw this patient in clinic. He is post OLT, hx of biliary anastomotic stricture/sludge, had stents a few times, last one was metal stent in March.    His alkaline phos and bilirubin are trending up slowly. No cholangitis. I started him on prophy antibiotics. He had CT today, was mainly to look at fluid collections.     I think he will need repeat ERCP and balloon sweep again. Or if you want MRCP or EUS first to look at. I do not think it is rejection or sthelse.    Please let me know what you think and we can schedule the appropriate procedures. I ccnicholas Hall, his txp coordinator and Dr. Patel from [facility].    Thank you.    Georgina

## 2018-04-25 ENCOUNTER — TELEPHONE (OUTPATIENT)
Dept: TRANSPLANT | Facility: CLINIC | Age: 28
End: 2018-04-25

## 2018-04-25 NOTE — TELEPHONE ENCOUNTER
----- Message from Pauline Magaña MA sent at 4/25/2018 10:22 AM CDT -----  Regarding: RE: Biliary anastomotic stricture, bilirubin going up  Done  ----- Message -----  From: Georgina Rogers MD  Sent: 4/25/2018   8:30 AM  To: Pauline Magaña MA, Sean Patel MD, #  Subject: RE: Biliary anastomotic stricture, bilirubin#    Thank you.    Yes, please perform liver biopsy as well, we can r/o other causes DILI etc. Thank you.    NL  ----- Message -----  From: Lara Nugent MD  Sent: 4/24/2018  10:59 AM  To: Pauline Magaña MA, Sean Patel MD, #  Subject: RE: Biliary anastomotic stricture, bilirubin#    Thank you Georgina,  He really doesn't have much of a stricture on the last cholangiogram. I still did put a metal stent at that time given his fever and suspicious for cholangitis. It is confusing why his liver tests are up again.   I can look with EUS +/- ERCP at the same time.   Let me know if you want me to get a liver biopsy at the same time EUS guided.     Thanks    Lara    ----- Message -----  From: Georgina Rogers MD  Sent: 4/23/2018   5:17 PM  To: Sean Patel MD, #  Subject: Biliary anastomotic stricture, bilirubin goi#    Hi,    I just saw this patient in clinic. He is post OLT, hx of biliary anastomotic stricture/sludge, had stents a few times, last one was metal stent in March.    His alkaline phos and bilirubin are trending up slowly. No cholangitis. I started him on prophy antibiotics. He had CT today, was mainly to look at fluid collections.     I think he will need repeat ERCP and balloon sweep again. Or if you want MRCP or EUS first to look at. I do not think it is rejection or sthelse.    Please let me know what you think and we can schedule the appropriate procedures. I cc'geronimo Hall, his txp coordinator and Dr. Patel from [facility].    Thank you.    Georgina

## 2018-04-27 ENCOUNTER — TELEPHONE (OUTPATIENT)
Dept: TRANSPLANT | Facility: CLINIC | Age: 28
End: 2018-04-27

## 2018-04-27 NOTE — TELEPHONE ENCOUNTER
----- Message from Fco Santos sent at 4/27/2018  1:44 PM CDT -----  Contact: patient   Patient calling for medication refill on oxyCODONE (ROXICODONE) 10 mg Tab immediate release tablet          Please call [phone]      Thanks

## 2018-04-27 NOTE — TELEPHONE ENCOUNTER
Pt has been notified pain rx has been deferred to pain management per Dr. Rogers. Pt has been notified

## 2018-04-30 ENCOUNTER — ANESTHESIA (OUTPATIENT)
Dept: ENDOSCOPY | Facility: HOSPITAL | Age: 28
DRG: 420 | End: 2018-04-30
Payer: COMMERCIAL

## 2018-04-30 ENCOUNTER — SURGERY (OUTPATIENT)
Age: 28
End: 2018-04-30

## 2018-04-30 ENCOUNTER — TELEPHONE (OUTPATIENT)
Dept: TRANSPLANT | Facility: CLINIC | Age: 28
End: 2018-04-30

## 2018-04-30 ENCOUNTER — HOSPITAL ENCOUNTER (INPATIENT)
Facility: HOSPITAL | Age: 28
LOS: 4 days | Discharge: HOME | DRG: 420 | End: 2018-05-04
Attending: INTERNAL MEDICINE | Admitting: INTERNAL MEDICINE
Payer: COMMERCIAL

## 2018-04-30 ENCOUNTER — ANESTHESIA EVENT (OUTPATIENT)
Dept: ENDOSCOPY | Facility: HOSPITAL | Age: 28
DRG: 420 | End: 2018-04-30
Payer: COMMERCIAL

## 2018-04-30 DIAGNOSIS — Z29.89 PROPHYLACTIC IMMUNOTHERAPY: Chronic | ICD-10-CM

## 2018-04-30 DIAGNOSIS — K59.00 CONSTIPATION, UNSPECIFIED CONSTIPATION TYPE: Primary | ICD-10-CM

## 2018-04-30 DIAGNOSIS — G89.29 CHRONIC ABDOMINAL PAIN: ICD-10-CM

## 2018-04-30 DIAGNOSIS — R11.2 NAUSEA AND VOMITING, INTRACTABILITY OF VOMITING NOT SPECIFIED, UNSPECIFIED VOMITING TYPE: ICD-10-CM

## 2018-04-30 DIAGNOSIS — K83.1 BILIARY STRICTURE OF TRANSPLANTED LIVER: ICD-10-CM

## 2018-04-30 DIAGNOSIS — Z94.4 S/P LIVER TRANSPLANT: Chronic | ICD-10-CM

## 2018-04-30 DIAGNOSIS — N18.6 ESRD (END STAGE RENAL DISEASE): ICD-10-CM

## 2018-04-30 DIAGNOSIS — Z79.60 LONG-TERM USE OF IMMUNOSUPPRESSANT MEDICATION: Chronic | ICD-10-CM

## 2018-04-30 DIAGNOSIS — D63.8 ANEMIA OF CHRONIC DISEASE: ICD-10-CM

## 2018-04-30 DIAGNOSIS — E43 SEVERE PROTEIN-CALORIE MALNUTRITION: ICD-10-CM

## 2018-04-30 DIAGNOSIS — R10.9 CHRONIC ABDOMINAL PAIN: ICD-10-CM

## 2018-04-30 DIAGNOSIS — N17.0 ACUTE RENAL FAILURE WITH TUBULAR NECROSIS: ICD-10-CM

## 2018-04-30 DIAGNOSIS — T86.49 BILIARY STRICTURE OF TRANSPLANTED LIVER: ICD-10-CM

## 2018-04-30 DIAGNOSIS — R79.89 ELEVATED LIVER FUNCTION TESTS: ICD-10-CM

## 2018-04-30 DIAGNOSIS — R18.8 OTHER ASCITES: ICD-10-CM

## 2018-04-30 LAB
ABO + RH BLD: NORMAL
ALBUMIN SERPL BCP-MCNC: 2.2 G/DL
ALP SERPL-CCNC: 136 U/L
ALT SERPL W/O P-5'-P-CCNC: <5 U/L
AMYLASE SERPL-CCNC: 18 U/L
ANION GAP SERPL CALC-SCNC: 12 MMOL/L
AST SERPL-CCNC: 21 U/L
BASOPHILS # BLD AUTO: 0.01 K/UL
BASOPHILS # BLD AUTO: 0.02 K/UL
BASOPHILS NFR BLD: 0.5 %
BASOPHILS NFR BLD: 0.7 %
BILIRUB SERPL-MCNC: 2.7 MG/DL
BLD GP AB SCN CELLS X3 SERPL QL: NORMAL
BUN SERPL-MCNC: 30 MG/DL
CALCIUM SERPL-MCNC: 9 MG/DL
CHLORIDE SERPL-SCNC: 102 MMOL/L
CO2 SERPL-SCNC: 22 MMOL/L
CREAT SERPL-MCNC: 5.4 MG/DL
DIFFERENTIAL METHOD: ABNORMAL
DIFFERENTIAL METHOD: ABNORMAL
EOSINOPHIL # BLD AUTO: 0.1 K/UL
EOSINOPHIL # BLD AUTO: 0.1 K/UL
EOSINOPHIL NFR BLD: 2.8 %
EOSINOPHIL NFR BLD: 3.8 %
ERYTHROCYTE [DISTWIDTH] IN BLOOD BY AUTOMATED COUNT: 12.9 %
ERYTHROCYTE [DISTWIDTH] IN BLOOD BY AUTOMATED COUNT: 12.9 %
EST. GFR  (AFRICAN AMERICAN): 15.3 ML/MIN/1.73 M^2
EST. GFR  (NON AFRICAN AMERICAN): 13.3 ML/MIN/1.73 M^2
GLUCOSE SERPL-MCNC: 85 MG/DL
HCT VFR BLD AUTO: 21.8 %
HCT VFR BLD AUTO: 22.1 %
HGB BLD-MCNC: 7.2 G/DL
HGB BLD-MCNC: 7.4 G/DL
IMM GRANULOCYTES # BLD AUTO: 0.01 K/UL
IMM GRANULOCYTES # BLD AUTO: 0.03 K/UL
IMM GRANULOCYTES NFR BLD AUTO: 0.5 %
IMM GRANULOCYTES NFR BLD AUTO: 1 %
LIPASE SERPL-CCNC: 5 U/L
LYMPHOCYTES # BLD AUTO: 0.6 K/UL
LYMPHOCYTES # BLD AUTO: 0.8 K/UL
LYMPHOCYTES NFR BLD: 27.8 %
LYMPHOCYTES NFR BLD: 29.8 %
MCH RBC QN AUTO: 31.9 PG
MCH RBC QN AUTO: 32 PG
MCHC RBC AUTO-ENTMCNC: 33 G/DL
MCHC RBC AUTO-ENTMCNC: 33.5 G/DL
MCV RBC AUTO: 96 FL
MCV RBC AUTO: 97 FL
MONOCYTES # BLD AUTO: 0.3 K/UL
MONOCYTES # BLD AUTO: 0.4 K/UL
MONOCYTES NFR BLD: 13.5 %
MONOCYTES NFR BLD: 14.9 %
NEUTROPHILS # BLD AUTO: 1.1 K/UL
NEUTROPHILS # BLD AUTO: 1.6 K/UL
NEUTROPHILS NFR BLD: 50.5 %
NEUTROPHILS NFR BLD: 54.2 %
NRBC BLD-RTO: 0 /100 WBC
NRBC BLD-RTO: 0 /100 WBC
PLATELET # BLD AUTO: 49 K/UL
PLATELET # BLD AUTO: 51 K/UL
PMV BLD AUTO: 10.7 FL
PMV BLD AUTO: 9.8 FL
POTASSIUM SERPL-SCNC: 4.6 MMOL/L
PROT SERPL-MCNC: 6.3 G/DL
RBC # BLD AUTO: 2.26 M/UL
RBC # BLD AUTO: 2.31 M/UL
SODIUM SERPL-SCNC: 136 MMOL/L
WBC # BLD AUTO: 2.08 K/UL
WBC # BLD AUTO: 2.88 K/UL

## 2018-04-30 PROCEDURE — 99223 1ST HOSP IP/OBS HIGH 75: CPT | Mod: NTX,,, | Performed by: NURSE PRACTITIONER

## 2018-04-30 PROCEDURE — 25000003 PHARM REV CODE 250: Mod: TXP | Performed by: NURSE ANESTHETIST, CERTIFIED REGISTERED

## 2018-04-30 PROCEDURE — 36415 COLL VENOUS BLD VENIPUNCTURE: CPT | Mod: NTX

## 2018-04-30 PROCEDURE — 63600175 PHARM REV CODE 636 W HCPCS: Mod: NTX | Performed by: STUDENT IN AN ORGANIZED HEALTH CARE EDUCATION/TRAINING PROGRAM

## 2018-04-30 PROCEDURE — 25000003 PHARM REV CODE 250: Mod: NTX | Performed by: NURSE PRACTITIONER

## 2018-04-30 PROCEDURE — 63600175 PHARM REV CODE 636 W HCPCS: Mod: TXP | Performed by: ANESTHESIOLOGY

## 2018-04-30 PROCEDURE — 74328 X-RAY BILE DUCT ENDOSCOPY: CPT | Mod: 26,NTX,, | Performed by: INTERNAL MEDICINE

## 2018-04-30 PROCEDURE — 88342 IMHCHEM/IMCYTCHM 1ST ANTB: CPT | Mod: 26,,, | Performed by: PATHOLOGY

## 2018-04-30 PROCEDURE — D9220A PRA ANESTHESIA: Mod: NTX,,, | Performed by: ANESTHESIOLOGY

## 2018-04-30 PROCEDURE — 37000008 HC ANESTHESIA 1ST 15 MINUTES: Mod: NTX | Performed by: INTERNAL MEDICINE

## 2018-04-30 PROCEDURE — 63600175 PHARM REV CODE 636 W HCPCS: Mod: NTX | Performed by: NURSE PRACTITIONER

## 2018-04-30 PROCEDURE — 94761 N-INVAS EAR/PLS OXIMETRY MLT: CPT | Mod: TXP

## 2018-04-30 PROCEDURE — 86920 COMPATIBILITY TEST SPIN: CPT | Mod: NTX

## 2018-04-30 PROCEDURE — 63600175 PHARM REV CODE 636 W HCPCS: Mod: TXP | Performed by: INTERNAL MEDICINE

## 2018-04-30 PROCEDURE — 43242 EGD US FINE NEEDLE BX/ASPIR: CPT | Mod: NTX | Performed by: INTERNAL MEDICINE

## 2018-04-30 PROCEDURE — 12000002 HC ACUTE/MED SURGE SEMI-PRIVATE ROOM: Mod: NTX

## 2018-04-30 PROCEDURE — 74328 X-RAY BILE DUCT ENDOSCOPY: CPT | Mod: NTX | Performed by: INTERNAL MEDICINE

## 2018-04-30 PROCEDURE — 0FC98ZZ EXTIRPATION OF MATTER FROM COMMON BILE DUCT, VIA NATURAL OR ARTIFICIAL OPENING ENDOSCOPIC: ICD-10-PCS | Performed by: INTERNAL MEDICINE

## 2018-04-30 PROCEDURE — 87040 BLOOD CULTURE FOR BACTERIA: CPT | Mod: NTX

## 2018-04-30 PROCEDURE — 80053 COMPREHEN METABOLIC PANEL: CPT | Mod: NTX

## 2018-04-30 PROCEDURE — 88313 SPECIAL STAINS GROUP 2: CPT | Mod: 26,NTX,, | Performed by: PATHOLOGY

## 2018-04-30 PROCEDURE — 88305 TISSUE EXAM BY PATHOLOGIST: CPT | Mod: NTX | Performed by: PATHOLOGY

## 2018-04-30 PROCEDURE — 37000009 HC ANESTHESIA EA ADD 15 MINS: Mod: NTX | Performed by: INTERNAL MEDICINE

## 2018-04-30 PROCEDURE — 43264 ERCP REMOVE DUCT CALCULI: CPT | Mod: NTX | Performed by: INTERNAL MEDICINE

## 2018-04-30 PROCEDURE — 82150 ASSAY OF AMYLASE: CPT | Mod: NTX

## 2018-04-30 PROCEDURE — 88307 TISSUE EXAM BY PATHOLOGIST: CPT | Mod: 26,NTX,, | Performed by: PATHOLOGY

## 2018-04-30 PROCEDURE — 27202125 HC BALLOON, EXTRACTION (ANY): Mod: NTX | Performed by: INTERNAL MEDICINE

## 2018-04-30 PROCEDURE — 27202131 HC NEEDLE, FNB SINGLE (ANY): Mod: NTX | Performed by: INTERNAL MEDICINE

## 2018-04-30 PROCEDURE — 86901 BLOOD TYPING SEROLOGIC RH(D): CPT | Mod: NTX

## 2018-04-30 PROCEDURE — 43264 ERCP REMOVE DUCT CALCULI: CPT | Mod: NTX,,, | Performed by: INTERNAL MEDICINE

## 2018-04-30 PROCEDURE — 63600175 PHARM REV CODE 636 W HCPCS: Mod: TXP | Performed by: NURSE ANESTHETIST, CERTIFIED REGISTERED

## 2018-04-30 PROCEDURE — 43242 EGD US FINE NEEDLE BX/ASPIR: CPT | Mod: 51,NTX,, | Performed by: INTERNAL MEDICINE

## 2018-04-30 PROCEDURE — 0FB04ZX EXCISION OF LIVER, PERCUTANEOUS ENDOSCOPIC APPROACH, DIAGNOSTIC: ICD-10-PCS | Performed by: INTERNAL MEDICINE

## 2018-04-30 PROCEDURE — 27000221 HC OXYGEN, UP TO 24 HOURS: Mod: TXP

## 2018-04-30 PROCEDURE — C1769 GUIDE WIRE: HCPCS | Mod: NTX | Performed by: INTERNAL MEDICINE

## 2018-04-30 PROCEDURE — 83690 ASSAY OF LIPASE: CPT | Mod: NTX

## 2018-04-30 PROCEDURE — 85025 COMPLETE CBC W/AUTO DIFF WBC: CPT | Mod: NTX

## 2018-04-30 RX ORDER — OXYCODONE HYDROCHLORIDE 5 MG/1
10 TABLET ORAL EVERY 4 HOURS PRN
Status: DISCONTINUED | OUTPATIENT
Start: 2018-04-30 | End: 2018-04-30

## 2018-04-30 RX ORDER — OXYCODONE HYDROCHLORIDE 5 MG/1
10 TABLET ORAL EVERY 6 HOURS PRN
Status: DISCONTINUED | OUTPATIENT
Start: 2018-04-30 | End: 2018-05-01

## 2018-04-30 RX ORDER — LEVOTHYROXINE SODIUM 75 UG/1
75 TABLET ORAL
Status: DISCONTINUED | OUTPATIENT
Start: 2018-05-01 | End: 2018-05-04 | Stop reason: HOSPADM

## 2018-04-30 RX ORDER — ACETAMINOPHEN 325 MG/1
650 TABLET ORAL EVERY 4 HOURS PRN
Status: DISCONTINUED | OUTPATIENT
Start: 2018-04-30 | End: 2018-05-04 | Stop reason: HOSPADM

## 2018-04-30 RX ORDER — CIPROFLOXACIN 500 MG/1
500 TABLET ORAL EVERY 12 HOURS
Qty: 10 TABLET | Refills: 0 | Status: SHIPPED | OUTPATIENT
Start: 2018-04-30 | End: 2018-05-04 | Stop reason: HOSPADM

## 2018-04-30 RX ORDER — OMEPRAZOLE 40 MG/1
40 CAPSULE, DELAYED RELEASE ORAL
Qty: 30 CAPSULE | Refills: 11 | Status: ON HOLD | OUTPATIENT
Start: 2018-04-30 | End: 2018-05-23 | Stop reason: HOSPADM

## 2018-04-30 RX ORDER — CIPROFLOXACIN 2 MG/ML
400 INJECTION, SOLUTION INTRAVENOUS ONCE
Status: COMPLETED | OUTPATIENT
Start: 2018-04-30 | End: 2018-04-30

## 2018-04-30 RX ORDER — PROPOFOL 10 MG/ML
VIAL (ML) INTRAVENOUS CONTINUOUS PRN
Status: DISCONTINUED | OUTPATIENT
Start: 2018-04-30 | End: 2018-04-30

## 2018-04-30 RX ORDER — ONDANSETRON 2 MG/ML
INJECTION INTRAMUSCULAR; INTRAVENOUS
Status: DISCONTINUED | OUTPATIENT
Start: 2018-04-30 | End: 2018-04-30

## 2018-04-30 RX ORDER — HEPARIN SODIUM 5000 [USP'U]/ML
5000 INJECTION, SOLUTION INTRAVENOUS; SUBCUTANEOUS EVERY 8 HOURS
Status: DISCONTINUED | OUTPATIENT
Start: 2018-04-30 | End: 2018-05-04 | Stop reason: HOSPADM

## 2018-04-30 RX ORDER — ERGOCALCIFEROL 1.25 MG/1
50000 CAPSULE ORAL
Status: DISCONTINUED | OUTPATIENT
Start: 2018-05-01 | End: 2018-05-04 | Stop reason: HOSPADM

## 2018-04-30 RX ORDER — FENTANYL CITRATE 50 UG/ML
25 INJECTION, SOLUTION INTRAMUSCULAR; INTRAVENOUS EVERY 4 HOURS PRN
Status: DISCONTINUED | OUTPATIENT
Start: 2018-04-30 | End: 2018-05-01

## 2018-04-30 RX ORDER — BISACODYL 5 MG
10 TABLET, DELAYED RELEASE (ENTERIC COATED) ORAL DAILY
Status: DISCONTINUED | OUTPATIENT
Start: 2018-04-30 | End: 2018-05-04 | Stop reason: HOSPADM

## 2018-04-30 RX ORDER — DRONABINOL 2.5 MG/1
2.5 CAPSULE ORAL NIGHTLY
Status: DISCONTINUED | OUTPATIENT
Start: 2018-04-30 | End: 2018-05-04 | Stop reason: HOSPADM

## 2018-04-30 RX ORDER — ONDANSETRON 8 MG/1
8 TABLET, ORALLY DISINTEGRATING ORAL EVERY 8 HOURS PRN
Status: DISCONTINUED | OUTPATIENT
Start: 2018-04-30 | End: 2018-05-04 | Stop reason: HOSPADM

## 2018-04-30 RX ORDER — URSODIOL 300 MG/1
300 CAPSULE ORAL 2 TIMES DAILY
Status: DISCONTINUED | OUTPATIENT
Start: 2018-04-30 | End: 2018-05-04 | Stop reason: HOSPADM

## 2018-04-30 RX ORDER — SODIUM CHLORIDE 9 MG/ML
INJECTION, SOLUTION INTRAVENOUS CONTINUOUS PRN
Status: DISCONTINUED | OUTPATIENT
Start: 2018-04-30 | End: 2018-04-30

## 2018-04-30 RX ORDER — PROPOFOL 10 MG/ML
VIAL (ML) INTRAVENOUS
Status: DISCONTINUED | OUTPATIENT
Start: 2018-04-30 | End: 2018-04-30

## 2018-04-30 RX ORDER — DIPHENHYDRAMINE HYDROCHLORIDE 50 MG/ML
25 INJECTION INTRAMUSCULAR; INTRAVENOUS EVERY 6 HOURS PRN
Status: DISCONTINUED | OUTPATIENT
Start: 2018-04-30 | End: 2018-05-01 | Stop reason: HOSPADM

## 2018-04-30 RX ORDER — LIDOCAINE HCL/PF 100 MG/5ML
SYRINGE (ML) INTRAVENOUS
Status: DISCONTINUED | OUTPATIENT
Start: 2018-04-30 | End: 2018-04-30

## 2018-04-30 RX ORDER — HYDROMORPHONE HYDROCHLORIDE 1 MG/ML
0.2 INJECTION, SOLUTION INTRAMUSCULAR; INTRAVENOUS; SUBCUTANEOUS EVERY 5 MIN PRN
Status: COMPLETED | OUTPATIENT
Start: 2018-04-30 | End: 2018-04-30

## 2018-04-30 RX ORDER — SODIUM CHLORIDE 0.9 % (FLUSH) 0.9 %
3 SYRINGE (ML) INJECTION
Status: DISCONTINUED | OUTPATIENT
Start: 2018-04-30 | End: 2018-05-04 | Stop reason: HOSPADM

## 2018-04-30 RX ORDER — OXYCODONE HYDROCHLORIDE 5 MG/1
10 TABLET ORAL EVERY 6 HOURS PRN
Status: DISCONTINUED | OUTPATIENT
Start: 2018-04-30 | End: 2018-04-30

## 2018-04-30 RX ORDER — SODIUM CHLORIDE 9 MG/ML
INJECTION, SOLUTION INTRAVENOUS CONTINUOUS
Status: DISCONTINUED | OUTPATIENT
Start: 2018-04-30 | End: 2018-04-30

## 2018-04-30 RX ORDER — FENTANYL CITRATE 50 UG/ML
25 INJECTION, SOLUTION INTRAMUSCULAR; INTRAVENOUS EVERY 5 MIN PRN
Status: COMPLETED | OUTPATIENT
Start: 2018-04-30 | End: 2018-04-30

## 2018-04-30 RX ORDER — PANTOPRAZOLE SODIUM 40 MG/1
40 TABLET, DELAYED RELEASE ORAL DAILY
Status: DISCONTINUED | OUTPATIENT
Start: 2018-04-30 | End: 2018-05-04 | Stop reason: HOSPADM

## 2018-04-30 RX ORDER — FENTANYL CITRATE 50 UG/ML
INJECTION, SOLUTION INTRAMUSCULAR; INTRAVENOUS
Status: DISCONTINUED | OUTPATIENT
Start: 2018-04-30 | End: 2018-04-30

## 2018-04-30 RX ORDER — LEVETIRACETAM 500 MG/1
500 TABLET ORAL 2 TIMES DAILY
Status: DISCONTINUED | OUTPATIENT
Start: 2018-04-30 | End: 2018-05-04 | Stop reason: HOSPADM

## 2018-04-30 RX ORDER — HYDROMORPHONE HYDROCHLORIDE 1 MG/ML
INJECTION, SOLUTION INTRAMUSCULAR; INTRAVENOUS; SUBCUTANEOUS
Status: COMPLETED
Start: 2018-04-30 | End: 2018-04-30

## 2018-04-30 RX ADMIN — PROPOFOL 200 MCG/KG/MIN: 10 INJECTION, EMULSION INTRAVENOUS at 09:04

## 2018-04-30 RX ADMIN — CYCLOSPORINE 75 MG: 25 CAPSULE, LIQUID FILLED ORAL at 11:04

## 2018-04-30 RX ADMIN — ACETAMINOPHEN 650 MG: 325 TABLET ORAL at 04:04

## 2018-04-30 RX ADMIN — FENTANYL CITRATE 25 MCG: 50 INJECTION, SOLUTION INTRAMUSCULAR; INTRAVENOUS at 09:04

## 2018-04-30 RX ADMIN — ONDANSETRON 4 MG: 2 INJECTION INTRAMUSCULAR; INTRAVENOUS at 09:04

## 2018-04-30 RX ADMIN — CIPROFLOXACIN 400 MG: 2 INJECTION, SOLUTION INTRAVENOUS at 09:04

## 2018-04-30 RX ADMIN — PANTOPRAZOLE SODIUM 40 MG: 40 TABLET, DELAYED RELEASE ORAL at 01:04

## 2018-04-30 RX ADMIN — Medication 0.2 MG: at 07:04

## 2018-04-30 RX ADMIN — FENTANYL CITRATE 25 MCG: 50 INJECTION INTRAMUSCULAR; INTRAVENOUS at 11:04

## 2018-04-30 RX ADMIN — Medication 0.2 MG: at 08:04

## 2018-04-30 RX ADMIN — OXYCODONE HYDROCHLORIDE 10 MG: 5 TABLET ORAL at 07:04

## 2018-04-30 RX ADMIN — OXYCODONE HYDROCHLORIDE 10 MG: 5 TABLET ORAL at 12:04

## 2018-04-30 RX ADMIN — FENTANYL CITRATE 25 MCG: 50 INJECTION, SOLUTION INTRAMUSCULAR; INTRAVENOUS at 10:04

## 2018-04-30 RX ADMIN — SODIUM CHLORIDE: 0.9 INJECTION, SOLUTION INTRAVENOUS at 09:04

## 2018-04-30 RX ADMIN — PROPOFOL 100 MG: 10 INJECTION, EMULSION INTRAVENOUS at 09:04

## 2018-04-30 RX ADMIN — LIDOCAINE HYDROCHLORIDE 50 MG: 20 INJECTION, SOLUTION INTRAVENOUS at 09:04

## 2018-04-30 RX ADMIN — LEVETIRACETAM 500 MG: 500 TABLET, FILM COATED ORAL at 09:04

## 2018-04-30 RX ADMIN — PROMETHAZINE HYDROCHLORIDE 6.25 MG: 25 INJECTION INTRAMUSCULAR; INTRAVENOUS at 12:04

## 2018-04-30 NOTE — ASSESSMENT & PLAN NOTE
- underwent outpatient ercp   - A previously placed stent had partially migrated into the biliary tree. The distal portion of the stent was 1 mm  above the biliary os. This was adjusted by pulling the stent partially out of the bile duct.   - The biliary tree was swept and sludge was found.  - more abdominal pain, work up for pancreatitis

## 2018-04-30 NOTE — HPI
Mr. Diana is a 26 yo male with PMHx of ETOH cirrhosis s/p OLTx on 10/2017 with a complicated post-op course with frequent admissions, LAURA now deemed ESRD and dialysis dependent, who presented to the hospital today for elective outpatient ERCP for evaluation of elevated liver enzymes, bili and abdominal pain. ERCP revealed that a previously placed stent had partially migrated into the biliary tree. The distal portion of the stent was 1 mm above the biliary os, and the stent was adjusted.  Post-procedure, patient complained of worsening of abdominal pain and now being admitted for monitoring.  Nephrology was consulted for ESRD management.    Patient currently is dialyzing at [facility] on a Monday and Friday schedule.  He maintains good residual renal function.

## 2018-04-30 NOTE — PROVATION PATIENT INSTRUCTIONS
Discharge Summary/Instructions after an Endoscopic Procedure  Patient Name: Jhonny Diana  Patient MRN: [id number]  Patient YOB: 1990 Monday, April 30, 2018  Lara Nugent MD  RESTRICTIONS:  During your procedure today, you received medications for sedation.  These   medications may affect your judgment, balance and coordination.  Therefore,   for 24 hours, you have the following restrictions:   - DO NOT drive a car, operate machinery, make legal/financial decisions,   sign important papers or drink alcohol.    ACTIVITY:  The following day: return to full activity including work, except no heavy   lifting, straining or running for 3 days if polyps were removed.  DIET:  Eat and drink normally unless instructed otherwise.     TREATMENT FOR COMMON SIDE EFFECTS:  - Mild abdominal pain, nausea, belching, bloating or excessive gas:  rest,   eat lightly and use a heating pad.  - Sore Throat: treat with throat lozenges and/or gargle with warm salt   water.  - Because air was used during the procedure, expelling large amounts of air   from your rectum or belching is normal.  - If a bowel prep was taken, you may not have a bowel movement for 1-3 days.    This is normal.  SYMPTOMS TO WATCH FOR AND REPORT TO YOUR PHYSICIAN:  1. Abdominal pain or bloating, other than gas cramps.  2. Chest pain.  3. Back pain.  4. Signs of infection such as: chills or fever occurring within 24 hours   after the procedure.  5. Rectal bleeding, which would show as bright red, maroon, or black stools.   (A tablespoon of blood from the rectum is not serious, especially if   hemorrhoids are present.)  6. Vomiting.  7. Weakness or dizziness.  GO DIRECTLY TO THE NEAREST EMERGENCY ROOM IF YOU HAVE ANY OF THE FOLLOWING:      Difficulty breathing              Chills and/or fever over 101 F   Persistent vomiting and/or vomiting blood   Severe abdominal pain   Severe chest pain   Black, tarry stools   Bleeding- more than one  tablespoon   Any other symptom or condition that you feel may need urgent attention  Your doctor recommends these additional instructions:  If any biopsies were taken, your doctors clinic will contact you in 1 to 2   weeks with any results.  - Discharge patient to home.   - Resume previous diet.   - Continue present medications.   - Return to referring physician.   - Await path results.   For questions, problems or results please call your physician - Lara Nugent MD at Work:  [phone].  [facility], EMERGENCY ROOM PHONE NUMBER: [phone]  IF A COMPLICATION OR EMERGENCY SITUATION ARISES AND YOU ARE UNABLE TO REACH   YOUR PHYSICIAN - GO DIRECTLY TO THE EMERGENCY ROOM.  Lara Nugent MD  4/30/2018 10:56:45 AM  This report has been verified and signed electronically.

## 2018-04-30 NOTE — TRANSFER OF CARE
"Anesthesia Transfer of Care Note    Patient: Jhonny Diana    Procedure(s) Performed: Procedure(s) (LRB):  ULTRASOUND-ENDOSCOPIC-UPPER/liver Bx (N/A)  ERCP (N/A)    Patient location: PACU    Anesthesia Type: general    Transport from OR: Transported from OR on 2-3 L/min O2 by NC with adequate spontaneous ventilation    Post pain: adequate analgesia    Post assessment: no apparent anesthetic complications    Post vital signs: stable    Level of consciousness: sedated    Nausea/Vomiting: no nausea/vomiting    Complications: none    Transfer of care protocol was followed      Last vitals:   Visit Vitals  BP (!) 152/95 (BP Location: Left arm, Patient Position: Lying)   Pulse 86   Temp 37.1 °C (98.7 °F) (Oral)   Resp 18   Ht 5' 6" (1.676 m)   Wt 63 kg (139 lb)   SpO2 99%   BMI 22.44 kg/m²     "

## 2018-04-30 NOTE — SUBJECTIVE & OBJECTIVE
Past Medical History:   Diagnosis Date    Alcoholic hepatitis with ascites     Alcoholic hepatitis with ascites     History of hematemesis 9/28/2017    Hypertension     Renal disorder     Seizures        Past Surgical History:   Procedure Laterality Date    APPENDECTOMY      ERCP      ESOPHAGOGASTRODUODENOSCOPY      LIVER TRANSPLANT         Review of patient's allergies indicates:   Allergen Reactions    Bactrim [sulfamethoxazole-trimethoprim] Other (See Comments)     Mookie Trell Syndrome     Current Facility-Administered Medications   Medication Frequency    0.9%  NaCl infusion Continuous    acetaminophen tablet 650 mg Q4H PRN    bisacodyl EC tablet 10 mg Daily    cycloSPORINE modified (NEORAL) capsule 100 mg BID    cycloSPORINE modified (NEORAL) capsule 75 mg BID    diphenhydrAMINE injection 25 mg Q6H PRN    dronabinol capsule 2.5 mg QHS    [START ON 5/1/2018] ergocalciferol capsule 50,000 Units Q7 Days    heparin (porcine) injection 5,000 Units Q8H    levETIRAcetam tablet 500 mg BID    [START ON 5/1/2018] levothyroxine tablet 75 mcg Before breakfast    ondansetron disintegrating tablet 8 mg Q8H PRN    oxyCODONE immediate release tablet 10 mg Q6H PRN    pantoprazole EC tablet 40 mg Daily    promethazine (PHENERGAN) 6.25 mg in dextrose 5 % 50 mL IVPB Q6H PRN    sodium chloride 0.9% flush 3 mL PRN    ursodiol capsule 300 mg BID     Family History     Problem Relation (Age of Onset)    No Known Problems Mother        Social History Main Topics    Smoking status: Former Smoker     Packs/day: 1.00     Years: 10.00     Types: Cigarettes     Start date: 2/19/2017    Smokeless tobacco: Never Used    Alcohol use No      Comment: a fifth of liquor daily for years, cut back over last 2 months    Drug use: No    Sexual activity: Not on file     Review of Systems   Constitutional: Positive for activity change, appetite change and fatigue. Negative for fever.   Respiratory: Negative for cough  and shortness of breath.    Cardiovascular: Negative for chest pain and leg swelling.   Gastrointestinal: Positive for abdominal pain, constipation, nausea and vomiting.   Allergic/Immunologic: Positive for immunocompromised state.   Neurological: Positive for weakness.     Objective:     Vital Signs (Most Recent):  Temp: 98.1 °F (36.7 °C) (04/30/18 1300)  Pulse: 86 (04/30/18 1400)  Resp: 15 (04/30/18 1400)  BP: (!) 157/103 (04/30/18 1400)  SpO2: 100 % (04/30/18 1400)  O2 Device (Oxygen Therapy): room air (04/30/18 1400) Vital Signs (24h Range):  Temp:  [98.1 °F (36.7 °C)-98.7 °F (37.1 °C)] 98.1 °F (36.7 °C)  Pulse:  [84-98] 86  Resp:  [14-19] 15  SpO2:  [97 %-100 %] 100 %  BP: (139-169)/() 157/103     Weight: 63 kg (139 lb) (04/30/18 0846)  Body mass index is 22.44 kg/m².  Body surface area is 1.71 meters squared.    No intake/output data recorded.    Physical Exam   Constitutional: He is oriented to person, place, and time. He appears well-developed.   Cachectic   HENT:   Head: Normocephalic and atraumatic.   Eyes: Pupils are equal, round, and reactive to light. Scleral icterus is present.   Neck: Normal range of motion.   Cardiovascular: Normal rate, regular rhythm, normal heart sounds and intact distal pulses.    Pulmonary/Chest: Effort normal and breath sounds normal.   Abdominal: Soft. Bowel sounds are normal. He exhibits no distension. There is tenderness.   Musculoskeletal: Normal range of motion. He exhibits no edema.   Neurological: He is alert and oriented to person, place, and time.   Skin: Skin is warm and dry.   Psychiatric: He has a normal mood and affect. His behavior is normal. Judgment and thought content normal.   Nursing note and vitals reviewed.      Significant Labs:  CBC:   Recent Labs  Lab 04/30/18  1328   WBC 2.08*   RBC 2.26*   HGB 7.2*   HCT 21.8*   PLT 51*   MCV 97   MCH 31.9*   MCHC 33.0     CMP:   Recent Labs  Lab 04/30/18  1328   GLU 85   CALCIUM 9.0   ALBUMIN 2.2*   PROT 6.3       K 4.6   CO2 22*      BUN 30*   CREATININE 5.4*   ALKPHOS 136*   ALT <5*   AST 21   BILITOT 2.7*

## 2018-04-30 NOTE — CONSULTS
[facility]  Nephrology  Consult Note    Patient Name: Jhonny Diana  MRN: [id number]  Admission Date: 4/30/2018  Hospital Length of Stay: 0 days  Attending Provider: Jw Valdivia MD   Primary Care Physician: Primary Doctor No  Principal Problem:Biliary stricture of transplanted liver    Inpatient consult to Nephrology  Consult performed by: ERASMO MCCLELLAN  Consult ordered by: VANDANA SAMUEL  Reason for consult: ESRD        Subjective:     HPI: Mr. Diana is a 26 yo male with PMHx of ETOH cirrhosis s/p OLTx on 10/2017 with a complicated post-op course with frequent admissions, LAURA now deemed ESRD and dialysis dependent, who presented to the hospital today for elective outpatient ERCP for evaluation of elevated liver enzymes, bili and abdominal pain. ERCP revealed that a previously placed stent had partially migrated into the biliary tree. The distal portion of the stent was 1 mm above the biliary os, and the stent was adjusted.  Post-procedure, patient complained of worsening of abdominal pain and now being admitted for monitoring.  Nephrology was consulted for ESRD management.    Patient currently is dialyzing at [facility] on a Monday and Friday schedule.  He maintains good residual renal function.      Past Medical History:   Diagnosis Date    Alcoholic hepatitis with ascites     Alcoholic hepatitis with ascites     History of hematemesis 9/28/2017    Hypertension     Renal disorder     Seizures        Past Surgical History:   Procedure Laterality Date    APPENDECTOMY      ERCP      ESOPHAGOGASTRODUODENOSCOPY      LIVER TRANSPLANT         Review of patient's allergies indicates:   Allergen Reactions    Bactrim [sulfamethoxazole-trimethoprim] Other (See Comments)     Mookie Trell Syndrome     Current Facility-Administered Medications   Medication Frequency    0.9%  NaCl infusion Continuous    acetaminophen tablet 650 mg Q4H PRN    bisacodyl EC tablet 10 mg Daily     cycloSPORINE modified (NEORAL) capsule 100 mg BID    cycloSPORINE modified (NEORAL) capsule 75 mg BID    diphenhydrAMINE injection 25 mg Q6H PRN    dronabinol capsule 2.5 mg QHS    [START ON 5/1/2018] ergocalciferol capsule 50,000 Units Q7 Days    heparin (porcine) injection 5,000 Units Q8H    levETIRAcetam tablet 500 mg BID    [START ON 5/1/2018] levothyroxine tablet 75 mcg Before breakfast    ondansetron disintegrating tablet 8 mg Q8H PRN    oxyCODONE immediate release tablet 10 mg Q6H PRN    pantoprazole EC tablet 40 mg Daily    promethazine (PHENERGAN) 6.25 mg in dextrose 5 % 50 mL IVPB Q6H PRN    sodium chloride 0.9% flush 3 mL PRN    ursodiol capsule 300 mg BID     Family History     Problem Relation (Age of Onset)    No Known Problems Mother        Social History Main Topics    Smoking status: Former Smoker     Packs/day: 1.00     Years: 10.00     Types: Cigarettes     Start date: 2/19/2017    Smokeless tobacco: Never Used    Alcohol use No      Comment: a fifth of liquor daily for years, cut back over last 2 months    Drug use: No    Sexual activity: Not on file     Review of Systems   Constitutional: Positive for activity change, appetite change and fatigue. Negative for fever.   Respiratory: Negative for cough and shortness of breath.    Cardiovascular: Negative for chest pain and leg swelling.   Gastrointestinal: Positive for abdominal pain, constipation, nausea and vomiting.   Allergic/Immunologic: Positive for immunocompromised state.   Neurological: Positive for weakness.     Objective:     Vital Signs (Most Recent):  Temp: 98.1 °F (36.7 °C) (04/30/18 1300)  Pulse: 86 (04/30/18 1400)  Resp: 15 (04/30/18 1400)  BP: (!) 157/103 (04/30/18 1400)  SpO2: 100 % (04/30/18 1400)  O2 Device (Oxygen Therapy): room air (04/30/18 1400) Vital Signs (24h Range):  Temp:  [98.1 °F (36.7 °C)-98.7 °F (37.1 °C)] 98.1 °F (36.7 °C)  Pulse:  [84-98] 86  Resp:  [14-19] 15  SpO2:  [97 %-100 %] 100 %  BP:  (139-169)/() 157/103     Weight: 63 kg (139 lb) (04/30/18 0846)  Body mass index is 22.44 kg/m².  Body surface area is 1.71 meters squared.    No intake/output data recorded.    Physical Exam   Constitutional: He is oriented to person, place, and time. He appears well-developed.   Cachectic   HENT:   Head: Normocephalic and atraumatic.   Eyes: Pupils are equal, round, and reactive to light. Scleral icterus is present.   Neck: Normal range of motion.   Cardiovascular: Normal rate, regular rhythm, normal heart sounds and intact distal pulses.    Pulmonary/Chest: Effort normal and breath sounds normal.   Abdominal: Soft. Bowel sounds are normal. He exhibits no distension. There is tenderness.   Musculoskeletal: Normal range of motion. He exhibits no edema.   Neurological: He is alert and oriented to person, place, and time.   Skin: Skin is warm and dry.   Psychiatric: He has a normal mood and affect. His behavior is normal. Judgment and thought content normal.   Nursing note and vitals reviewed.      Significant Labs:  CBC:   Recent Labs  Lab 04/30/18  1328   WBC 2.08*   RBC 2.26*   HGB 7.2*   HCT 21.8*   PLT 51*   MCV 97   MCH 31.9*   MCHC 33.0     CMP:   Recent Labs  Lab 04/30/18  1328   GLU 85   CALCIUM 9.0   ALBUMIN 2.2*   PROT 6.3      K 4.6   CO2 22*      BUN 30*   CREATININE 5.4*   ALKPHOS 136*   ALT <5*   AST 21   BILITOT 2.7*           Assessment/Plan:     ESRD (end stage renal disease)    ESRD on iHD Monday & Friday  FMC:  Deckbar  + residual renal function    Plan:  Due for dialysis today, patient post-procedure.  Electrolytes and volume status acceptable.  No indication for RRT today.  Will plan for HD treatment tomorrow.          Derrick Love, SAMREEN  Nephrology  [facility]  Pager:  [phone]        I have reviewed and concur with the NP's history, physical, assessment, and plan. I have personally interviewed and examined the patient at bedside.

## 2018-04-30 NOTE — H&P
Short Stay Endoscopy History and Physical    PCP - Primary Doctor No     Procedure - EUS/Liver Bx/ERCP  ASA - per anesthesia  Mallampati - per anesthesia  History of Anesthesia problems - no  Family history Anesthesia problems -  no   Plan of anesthesia - General    HPI:  This is a 27 y.o. male here for evaluation of : Elevated LFTs s/p OLT concern for biliary obstruction      Reflux - no  Dysphagia - no  Abdominal pain - no  Diarrhea - no    ROS:  Constitutional: No fevers, chills, No weight loss  CV: No chest pain  Pulm: No cough, No shortness of breath  Ophtho: No vision changes  GI: see HPI  Derm: No rash    Medical History:  has a past medical history of Alcoholic hepatitis with ascites; Alcoholic hepatitis with ascites; History of hematemesis (9/28/2017); Hypertension; and Renal disorder.    Surgical History:  has a past surgical history that includes Appendectomy; Liver transplant; Esophagogastroduodenoscopy; and ERCP.    Family History: family history includes No Known Problems in his mother.. Otherwise no colon cancer, inflammatory bowel disease, or GI malignancies.    Social History:  reports that he has quit smoking. His smoking use included Cigarettes. He started smoking about 14 months ago. He has a 10.00 pack-year smoking history. He has never used smokeless tobacco. He reports that he does not drink alcohol or use drugs.    Review of patient's allergies indicates:   Allergen Reactions    Bactrim [sulfamethoxazole-trimethoprim] Other (See Comments)     Mookie Trell Syndrome       Medications:   Prescriptions Prior to Admission   Medication Sig Dispense Refill Last Dose    amoxicillin-clavulanate 875-125mg (AUGMENTIN) 875-125 mg per tablet Take 1 tablet by mouth every 12 (twelve) hours. 20 tablet 0 Taking    bisacodyl (DULCOLAX) 5 mg EC tablet Take 2 tablets (10 mg total) by mouth once daily. 60 tablet 0 Taking    cycloSPORINE modified, NEORAL, (NEORAL) 100 MG capsule Take 1 capsule (100 mg  total) by mouth 2 (two) times daily. 60 capsule 11 Taking    cycloSPORINE modified, NEORAL, 25 MG capsule Take 3 capsules (75 mg total) by mouth 2 (two) times daily. 180 capsule 5 Taking    dronabinol (MARINOL) 2.5 MG capsule Take 1 capsule (2.5 mg total) by mouth every evening. 30 capsule 0 Taking    ergocalciferol (ERGOCALCIFEROL) 50,000 unit Cap Take 1 capsule (50,000 Units total) by mouth every 7 days. 4 capsule 2 Taking    levETIRAcetam (KEPPRA) 500 MG Tab Take 1 tablet (500 mg total) by mouth 2 (two) times daily. 60 tablet 11 Taking    levothyroxine (SYNTHROID) 75 MCG tablet Take 1 tablet (75 mcg total) by mouth before breakfast. 30 tablet 11 Taking    metoclopramide HCl (REGLAN) 10 MG tablet Take 1 tablet (10 mg total) by mouth 4 (four) times daily before meals and nightly. 120 tablet 1 Taking    omeprazole (PRILOSEC) 40 MG capsule Take 1 capsule (40 mg total) by mouth before breakfast. 30 capsule 11 Taking    ondansetron (ZOFRAN-ODT) 8 MG TbDL Take 1 tablet (8 mg total) by mouth every 8 (eight) hours as needed (nausea). 30 tablet 1 Taking    oxyCODONE (ROXICODONE) 10 mg Tab immediate release tablet Take 0.5-1 tablets (5-10 mg total) by mouth every 4 (four) hours as needed for Pain. 60 tablet 0 Taking    sevelamer carbonate (RENVELA) 800 mg Tab Take 1 tablet (800 mg total) by mouth 3 (three) times daily with meals. 90 tablet 11 Taking    ursodiol (ACTIGALL) 300 mg capsule Take 1 capsule (300 mg total) by mouth 2 (two) times daily. 60 capsule 11 Taking       Physical Exam:    Vital Signs: There were no vitals filed for this visit.    General Appearance: Well appearing in no acute distress  Eyes:    No scleral icterus  ENT: Neck supple, Lips, mucosa, and tongue normal; teeth and gums normal  Lungs: CTA anteriorly  Heart:  Regular rate, S1, S2 normal, no murmurs heard.  Abdomen: Soft, non tender, non distended with normal bowel sounds.  Extremities: No edema  Skin: No rash    Labs:  Lab Results    Component Value Date    WBC 2.10 (L) 04/23/2018    HGB 9.5 (L) 04/23/2018    HCT 29.2 (L) 04/23/2018     (L) 04/23/2018    CHOL 121 03/11/2018    TRIG 297 (H) 03/11/2018    HDL 12 (L) 03/11/2018    ALT 10 04/23/2018    AST 30 04/23/2018     04/23/2018    K 5.0 04/23/2018     04/23/2018    CREATININE 5.1 (H) 04/23/2018    BUN 25 (H) 04/23/2018    CO2 25 04/23/2018    TSH 5.589 (H) 02/26/2018    INR 1.0 04/23/2018    HGBA1C <4.0 (A) 10/18/2017       I have explained the risks and benefits of endoscopy procedures to the patient including but not limited to pancreatitis, bleeding, perforation, infection, and death.    Bairon Tam   Gastroenterology Fellow PGY VI  [phone]

## 2018-04-30 NOTE — PROGRESS NOTES
Spoke with Dr Montes regarding patient's 8/10 pain after administering  Fentanyl 100 mcg IV, Dr Montes stated that patient c/o pain before procedure and was treated accordingly.

## 2018-04-30 NOTE — TELEPHONE ENCOUNTER
----- Message from Georgina Rogers MD sent at 4/30/2018 12:04 PM CDT -----  Please disregard my previous message, there was sludge and Dr. Amrik Nugent did reposition the stent and balloon swept it.

## 2018-04-30 NOTE — PROVATION PATIENT INSTRUCTIONS
Discharge Summary/Instructions after an Endoscopic Procedure  Patient Name: Jhonny Diana  Patient MRN: [id number]  Patient YOB: 1990 Monday, April 30, 2018  Lara Nugent MD  RESTRICTIONS:  During your procedure today, you received medications for sedation.  These   medications may affect your judgment, balance and coordination.  Therefore,   for 24 hours, you have the following restrictions:   - DO NOT drive a car, operate machinery, make legal/financial decisions,   sign important papers or drink alcohol.    ACTIVITY:  The following day: return to full activity including work, except no heavy   lifting, straining or running for 3 days if polyps were removed.  DIET:  Eat and drink normally unless instructed otherwise.     TREATMENT FOR COMMON SIDE EFFECTS:  - Mild abdominal pain, nausea, belching, bloating or excessive gas:  rest,   eat lightly and use a heating pad.  - Sore Throat: treat with throat lozenges and/or gargle with warm salt   water.  - Because air was used during the procedure, expelling large amounts of air   from your rectum or belching is normal.  - If a bowel prep was taken, you may not have a bowel movement for 1-3 days.    This is normal.  SYMPTOMS TO WATCH FOR AND REPORT TO YOUR PHYSICIAN:  1. Abdominal pain or bloating, other than gas cramps.  2. Chest pain.  3. Back pain.  4. Signs of infection such as: chills or fever occurring within 24 hours   after the procedure.  5. Rectal bleeding, which would show as bright red, maroon, or black stools.   (A tablespoon of blood from the rectum is not serious, especially if   hemorrhoids are present.)  6. Vomiting.  7. Weakness or dizziness.  GO DIRECTLY TO THE NEAREST EMERGENCY ROOM IF YOU HAVE ANY OF THE FOLLOWING:      Difficulty breathing              Chills and/or fever over 101 F   Persistent vomiting and/or vomiting blood   Severe abdominal pain   Severe chest pain   Black, tarry stools   Bleeding- more than one  tablespoon   Any other symptom or condition that you feel may need urgent attention  Your doctor recommends these additional instructions:  If any biopsies were taken, your doctors clinic will contact you in 1 to 2   weeks with any results.  - Discharge patient to home.   - Resume previous diet.   - Continue present medications.   - Return to referring physician.   - Repeat ERCP in 2 months to remove stent.  For questions, problems or results please call your physician - Lara Nugent MD at Work:  [phone].  [facility], EMERGENCY ROOM PHONE NUMBER: [phone]  IF A COMPLICATION OR EMERGENCY SITUATION ARISES AND YOU ARE UNABLE TO REACH   YOUR PHYSICIAN - GO DIRECTLY TO THE EMERGENCY ROOM.  Lara Nugent MD  4/30/2018 11:52:44 AM  This report has been verified and signed electronically.

## 2018-04-30 NOTE — H&P
[facility]  Liver Transplant  History & Physical    Patient Name: Jhonny Diana  MRN: [id number]  Admission Date: 2018  Code Status: Full Code  Primary Care Provider: Primary Doctor No  Post-Operative Day: 193     ORGAN:   LIVER  Disease Etiology: Acute Alcoholic Hepatitis  Donor Type:    - Brain Death  [facility] High Risk:   No  Donor CMV Status:   Donor CMV Status: Positive  Donor HBcAB:   Negative  Donor HCV Status:   Negative  Whole or Partial: Whole Liver  Biliary Anastomosis: End to End  Arterial Anatomy: Standard    Subjective:     History of Present Illness:  26 y/o male with pmh of esrd 2/2 ETOH cirrhosis.  S/p oltx 10/19/2017 (Liver) with steroid induction.  Post op course with multiple complications to include biliary stricture, FUO, esrd, and chronic abdominal pain.  Underwent outpatient ERCP for rise in bilirubin and abdominal pain.  Post ERCP with persistent pain.  Will admit for pain treatment and pancreatitis workup.    Past Medical History:   Diagnosis Date    Alcoholic hepatitis with ascites     Alcoholic hepatitis with ascites     History of hematemesis 2017    Hypertension     Renal disorder     Seizures        Past Surgical History:   Procedure Laterality Date    APPENDECTOMY      ERCP      ESOPHAGOGASTRODUODENOSCOPY      LIVER TRANSPLANT         Review of patient's allergies indicates:   Allergen Reactions    Bactrim [sulfamethoxazole-trimethoprim] Other (See Comments)     Mookie Trell Syndrome       Family History     Problem Relation (Age of Onset)    No Known Problems Mother        Social History Main Topics    Smoking status: Former Smoker     Packs/day: 1.00     Years: 10.00     Types: Cigarettes     Start date: 2017    Smokeless tobacco: Never Used    Alcohol use No      Comment: a fifth of liquor daily for years, cut back over last 2 months    Drug use: No    Sexual activity: Not on file       PTA Medications   Medication Sig     amoxicillin-clavulanate 875-125mg (AUGMENTIN) 875-125 mg per tablet Take 1 tablet by mouth every 12 (twelve) hours.    bisacodyl (DULCOLAX) 5 mg EC tablet Take 2 tablets (10 mg total) by mouth once daily.    cycloSPORINE modified, NEORAL, (NEORAL) 100 MG capsule Take 1 capsule (100 mg total) by mouth 2 (two) times daily.    cycloSPORINE modified, NEORAL, 25 MG capsule Take 3 capsules (75 mg total) by mouth 2 (two) times daily.    dronabinol (MARINOL) 2.5 MG capsule Take 1 capsule (2.5 mg total) by mouth every evening.    ergocalciferol (ERGOCALCIFEROL) 50,000 unit Cap Take 1 capsule (50,000 Units total) by mouth every 7 days.    levETIRAcetam (KEPPRA) 500 MG Tab Take 1 tablet (500 mg total) by mouth 2 (two) times daily.    levothyroxine (SYNTHROID) 75 MCG tablet Take 1 tablet (75 mcg total) by mouth before breakfast.    metoclopramide HCl (REGLAN) 10 MG tablet Take 1 tablet (10 mg total) by mouth 4 (four) times daily before meals and nightly.    ondansetron (ZOFRAN-ODT) 8 MG TbDL Take 1 tablet (8 mg total) by mouth every 8 (eight) hours as needed (nausea).    oxyCODONE (ROXICODONE) 10 mg Tab immediate release tablet Take 0.5-1 tablets (5-10 mg total) by mouth every 4 (four) hours as needed for Pain.    sevelamer carbonate (RENVELA) 800 mg Tab Take 1 tablet (800 mg total) by mouth 3 (three) times daily with meals.    ursodiol (ACTIGALL) 300 mg capsule Take 1 capsule (300 mg total) by mouth 2 (two) times daily.       Review of Systems   Constitutional: Positive for activity change, appetite change and fatigue. Negative for fever.   Respiratory: Negative for cough and shortness of breath.    Cardiovascular: Negative for chest pain and leg swelling.   Gastrointestinal: Positive for abdominal pain, constipation, nausea and vomiting.   Allergic/Immunologic: Positive for immunocompromised state.   Neurological: Positive for weakness.     Objective:     Vital Signs (Most Recent):  Temp: 98.2 °F (36.8 °C)  (04/30/18 1025)  Pulse: 86 (04/30/18 1215)  Resp: 17 (04/30/18 1215)  BP: (!) 155/103 (04/30/18 1215)  SpO2: 98 % (04/30/18 1215) Vital Signs (24h Range):  Temp:  [98.2 °F (36.8 °C)-98.7 °F (37.1 °C)] 98.2 °F (36.8 °C)  Pulse:  [85-98] 86  Resp:  [14-19] 17  SpO2:  [98 %-100 %] 98 %  BP: (139-159)/() 155/103     Weight: 63 kg (139 lb)  Body mass index is 22.44 kg/m².      Intake/Output Summary (Last 24 hours) at 04/30/18 1346  Last data filed at 04/30/18 1013   Gross per 24 hour   Intake              500 ml   Output                0 ml   Net              500 ml       Physical Exam   Constitutional: He is oriented to person, place, and time. He appears well-developed.   Cachectic, hand and temporal muscle wasting   HENT:   Head: Normocephalic and atraumatic.   Eyes: Pupils are equal, round, and reactive to light. Scleral icterus is present.   Neck: Normal range of motion.   Cardiovascular: Normal rate, regular rhythm, normal heart sounds and intact distal pulses.    Pulmonary/Chest: Effort normal and breath sounds normal.   Abdominal: Soft. Bowel sounds are normal. He exhibits no distension. There is tenderness.   Musculoskeletal: Normal range of motion. He exhibits no edema.   Neurological: He is alert and oriented to person, place, and time.   Skin: Skin is warm and dry.   Psychiatric: He has a normal mood and affect. His behavior is normal. Judgment and thought content normal.   Nursing note and vitals reviewed.      Laboratory:  CBC: No results for input(s): WBC, RBC, HGB, HCT, PLT, MCV, MCH, MCHC in the last 168 hours.  CMP: No results for input(s): GLU, CALCIUM, ALBUMIN, PROT, NA, K, CO2, CL, BUN, CREATININE, ALKPHOS, ALT, AST, BILITOT in the last 168 hours.  Labs within the past 24 hours have been reviewed.    Diagnostic Results:  None    Assessment/Plan:     * Biliary stricture of transplanted liver    - underwent outpatient ercp   - A previously placed stent had partially migrated into the biliary  tree. The distal portion of the stent was 1 mm  above the biliary os. This was adjusted by pulling the stent partially out of the bile duct.   - The biliary tree was swept and sludge was found.  - more abdominal pain, work up for pancreatitis        Severe protein-calorie malnutrition    - continue marinol for appetite stimulant  - encourage po intake          Acute renal failure with tubular necrosis    - consult nephrology to manage dialysis          S/P liver transplant    - LFTs stable  - bilirubin elevated, under ercp see above        Long-term use of immunosuppressant medication    - continue cyclosporine, monitor cya levels daily and adjust for therapeutic dose        Prophylactic immunotherapy    - see long term immunosuppression        Anemia of chronic disease    - h/h stable          Nausea and vomiting    - zofran and phenergan ordered for symptom control              Discharge Planning:  Monitor kidney function --> may need outpt HD  Monitor HH needs vs rehab for deconditioned status      Seda Camacho, NP  Liver Transplant  [facility]

## 2018-04-30 NOTE — HPI
26 y/o male with pmh of esrd 2/2 ETOH cirrhosis.  S/p oltx 10/19/2017 (Liver) with steroid induction.  Post op course with multiple complications to include biliary stricture, FUO, esrd, and chronic abdominal pain.  Underwent outpatient ERCP for rise in bilirubin and abdominal pain.  Post ERCP with persistent pain.  Will admit for pain treatment and pancreatitis workup.

## 2018-04-30 NOTE — ANESTHESIA PREPROCEDURE EVALUATION
04/30/2018  Jhonny Diana is a 27 y.o., male.  Patient Active Problem List   Diagnosis    Acute renal failure with tubular necrosis    S/P liver transplant    Prophylactic immunotherapy    Long-term use of immunosuppressant medication    At risk for opportunistic infections    Seizure    Delayed surgical wound healing    Anemia of chronic disease    Biliary stricture of transplanted liver    Hypothyroidism    Nausea and vomiting    Severe protein-calorie malnutrition    Constipation    Other cytomegaloviral diseases    Vitamin D deficiency    Hyperphosphatemia    Alcohol use disorder, severe, in early remission         Anesthesia Evaluation         Review of Systems      Physical Exam  General:  Well nourished    Airway/Jaw/Neck:  Airway Findings: Mouth Opening: Normal Tongue: Normal  General Airway Assessment: Adult  Mallampati: II  Improves to II with phonation.  TM Distance: Normal, at least 6 cm      Dental:  Dental Findings: In tact   Chest/Lungs:  Chest/Lungs Findings: Clear to auscultation     Heart/Vascular:  Heart Findings: Rate: Normal  Rhythm: Regular Rhythm  Sounds: Normal        Mental Status:  Mental Status Findings:  Cooperative, Alert and Oriented         Anesthesia Plan  Type of Anesthesia, risks & benefits discussed:  Anesthesia Type:  general  Patient's Preference: General  Intra-op Monitoring Plan: standard ASA monitors  Intra-op Monitoring Plan Comments: Standard ASA monitors.   Post Op Pain Control Plan: per primary service following discharge from PACU  Post Op Pain Control Plan Comments: Per primary service.     Induction:   IV  Beta Blocker:  Patient is not currently on a Beta-Blocker (No further documentation required).       Informed Consent: Patient understands risks and agrees with Anesthesia plan.  Questions answered. Anesthesia consent signed with patient.  ASA  Score: 4     Day of Surgery Review of History & Physical:    H&P update referred to the surgeon.     Anesthesia Plan Notes: Chart reviewed, patient interviewed and examined.  The plan for general anesthesia was explained.  Questions were answered and the consent was signed.  Christine العلي For Surgery From Anesthesia Perspective.

## 2018-04-30 NOTE — ASSESSMENT & PLAN NOTE
ESRD on iHD Monday & Friday  FMC:  Deckbar  + residual renal function    Plan:  Due for dialysis today, patient post-procedure.  Electrolytes and volume status acceptable.  No indication for RRT today.  Will plan for HD treatment tomorrow.

## 2018-04-30 NOTE — TELEPHONE ENCOUNTER
----- Message from Fco Santos sent at 4/30/2018  4:35 PM CDT -----  Contact: patient mother  Patient mother  returning a call           Please call [phone]        Thanks

## 2018-04-30 NOTE — SUBJECTIVE & OBJECTIVE
Past Medical History:   Diagnosis Date    Alcoholic hepatitis with ascites     Alcoholic hepatitis with ascites     History of hematemesis 9/28/2017    Hypertension     Renal disorder     Seizures        Past Surgical History:   Procedure Laterality Date    APPENDECTOMY      ERCP      ESOPHAGOGASTRODUODENOSCOPY      LIVER TRANSPLANT         Review of patient's allergies indicates:   Allergen Reactions    Bactrim [sulfamethoxazole-trimethoprim] Other (See Comments)     Mookie Trell Syndrome       Family History     Problem Relation (Age of Onset)    No Known Problems Mother        Social History Main Topics    Smoking status: Former Smoker     Packs/day: 1.00     Years: 10.00     Types: Cigarettes     Start date: 2/19/2017    Smokeless tobacco: Never Used    Alcohol use No      Comment: a fifth of liquor daily for years, cut back over last 2 months    Drug use: No    Sexual activity: Not on file       PTA Medications   Medication Sig    amoxicillin-clavulanate 875-125mg (AUGMENTIN) 875-125 mg per tablet Take 1 tablet by mouth every 12 (twelve) hours.    bisacodyl (DULCOLAX) 5 mg EC tablet Take 2 tablets (10 mg total) by mouth once daily.    cycloSPORINE modified, NEORAL, (NEORAL) 100 MG capsule Take 1 capsule (100 mg total) by mouth 2 (two) times daily.    cycloSPORINE modified, NEORAL, 25 MG capsule Take 3 capsules (75 mg total) by mouth 2 (two) times daily.    dronabinol (MARINOL) 2.5 MG capsule Take 1 capsule (2.5 mg total) by mouth every evening.    ergocalciferol (ERGOCALCIFEROL) 50,000 unit Cap Take 1 capsule (50,000 Units total) by mouth every 7 days.    levETIRAcetam (KEPPRA) 500 MG Tab Take 1 tablet (500 mg total) by mouth 2 (two) times daily.    levothyroxine (SYNTHROID) 75 MCG tablet Take 1 tablet (75 mcg total) by mouth before breakfast.    metoclopramide HCl (REGLAN) 10 MG tablet Take 1 tablet (10 mg total) by mouth 4 (four) times daily before meals and nightly.    ondansetron  (ZOFRAN-ODT) 8 MG TbDL Take 1 tablet (8 mg total) by mouth every 8 (eight) hours as needed (nausea).    oxyCODONE (ROXICODONE) 10 mg Tab immediate release tablet Take 0.5-1 tablets (5-10 mg total) by mouth every 4 (four) hours as needed for Pain.    sevelamer carbonate (RENVELA) 800 mg Tab Take 1 tablet (800 mg total) by mouth 3 (three) times daily with meals.    ursodiol (ACTIGALL) 300 mg capsule Take 1 capsule (300 mg total) by mouth 2 (two) times daily.       Review of Systems   Constitutional: Positive for activity change, appetite change and fatigue. Negative for fever.   Respiratory: Negative for cough and shortness of breath.    Cardiovascular: Negative for chest pain and leg swelling.   Gastrointestinal: Positive for abdominal pain, constipation, nausea and vomiting.   Allergic/Immunologic: Positive for immunocompromised state.   Neurological: Positive for weakness.     Objective:     Vital Signs (Most Recent):  Temp: 98.2 °F (36.8 °C) (04/30/18 1025)  Pulse: 86 (04/30/18 1215)  Resp: 17 (04/30/18 1215)  BP: (!) 155/103 (04/30/18 1215)  SpO2: 98 % (04/30/18 1215) Vital Signs (24h Range):  Temp:  [98.2 °F (36.8 °C)-98.7 °F (37.1 °C)] 98.2 °F (36.8 °C)  Pulse:  [85-98] 86  Resp:  [14-19] 17  SpO2:  [98 %-100 %] 98 %  BP: (139-159)/() 155/103     Weight: 63 kg (139 lb)  Body mass index is 22.44 kg/m².      Intake/Output Summary (Last 24 hours) at 04/30/18 1346  Last data filed at 04/30/18 1013   Gross per 24 hour   Intake              500 ml   Output                0 ml   Net              500 ml       Physical Exam   Constitutional: He is oriented to person, place, and time. He appears well-developed.   Cachectic, hand and temporal muscle wasting   HENT:   Head: Normocephalic and atraumatic.   Eyes: Pupils are equal, round, and reactive to light. Scleral icterus is present.   Neck: Normal range of motion.   Cardiovascular: Normal rate, regular rhythm, normal heart sounds and intact distal pulses.     Pulmonary/Chest: Effort normal and breath sounds normal.   Abdominal: Soft. Bowel sounds are normal. He exhibits no distension. There is tenderness.   Musculoskeletal: Normal range of motion. He exhibits no edema.   Neurological: He is alert and oriented to person, place, and time.   Skin: Skin is warm and dry.   Psychiatric: He has a normal mood and affect. His behavior is normal. Judgment and thought content normal.   Nursing note and vitals reviewed.      Laboratory:  CBC: No results for input(s): WBC, RBC, HGB, HCT, PLT, MCV, MCH, MCHC in the last 168 hours.  CMP: No results for input(s): GLU, CALCIUM, ALBUMIN, PROT, NA, K, CO2, CL, BUN, CREATININE, ALKPHOS, ALT, AST, BILITOT in the last 168 hours.  Labs within the past 24 hours have been reviewed.    Diagnostic Results:  None

## 2018-05-01 LAB
ALBUMIN SERPL BCP-MCNC: 2.3 G/DL
ALP SERPL-CCNC: 127 U/L
ALT SERPL W/O P-5'-P-CCNC: <5 U/L
AMYLASE SERPL-CCNC: 15 U/L
ANION GAP SERPL CALC-SCNC: 12 MMOL/L
ANISOCYTOSIS BLD QL SMEAR: SLIGHT
AST SERPL-CCNC: 21 U/L
BACTERIA #/AREA URNS AUTO: ABNORMAL /HPF
BASOPHILS # BLD AUTO: 0.01 K/UL
BASOPHILS # BLD AUTO: 0.03 K/UL
BASOPHILS NFR BLD: 0.5 %
BASOPHILS NFR BLD: 1.2 %
BILIRUB SERPL-MCNC: 2.5 MG/DL
BILIRUB UR QL STRIP: NEGATIVE
BLD PROD TYP BPU: NORMAL
BLD PROD TYP BPU: NORMAL
BLOOD UNIT EXPIRATION DATE: NORMAL
BLOOD UNIT EXPIRATION DATE: NORMAL
BLOOD UNIT TYPE CODE: 5100
BLOOD UNIT TYPE CODE: 5100
BLOOD UNIT TYPE: NORMAL
BLOOD UNIT TYPE: NORMAL
BUN SERPL-MCNC: 36 MG/DL
CALCIUM SERPL-MCNC: 8.6 MG/DL
CHLORIDE SERPL-SCNC: 101 MMOL/L
CLARITY UR REFRACT.AUTO: ABNORMAL
CO2 SERPL-SCNC: 20 MMOL/L
CODING SYSTEM: NORMAL
CODING SYSTEM: NORMAL
COLOR UR AUTO: ABNORMAL
CREAT SERPL-MCNC: 5.9 MG/DL
CYCLOSPORINE BLD LC/MS/MS-MCNC: 351 NG/ML
DIFFERENTIAL METHOD: ABNORMAL
DIFFERENTIAL METHOD: ABNORMAL
DISPENSE STATUS: NORMAL
DISPENSE STATUS: NORMAL
EOSINOPHIL # BLD AUTO: 0.1 K/UL
EOSINOPHIL # BLD AUTO: 0.1 K/UL
EOSINOPHIL NFR BLD: 3.3 %
EOSINOPHIL NFR BLD: 4.2 %
ERYTHROCYTE [DISTWIDTH] IN BLOOD BY AUTOMATED COUNT: 13.1 %
ERYTHROCYTE [DISTWIDTH] IN BLOOD BY AUTOMATED COUNT: 13.7 %
EST. GFR  (AFRICAN AMERICAN): 13.8 ML/MIN/1.73 M^2
EST. GFR  (NON AFRICAN AMERICAN): 11.9 ML/MIN/1.73 M^2
GLUCOSE SERPL-MCNC: 53 MG/DL
GLUCOSE UR QL STRIP: NEGATIVE
HCT VFR BLD AUTO: 19.8 %
HCT VFR BLD AUTO: 28.3 %
HCT VFR BLD AUTO: 29.3 %
HGB BLD-MCNC: 6.3 G/DL
HGB BLD-MCNC: 9.7 G/DL
HGB BLD-MCNC: 9.8 G/DL
HGB UR QL STRIP: NEGATIVE
HYALINE CASTS UR QL AUTO: 3 /LPF
IMM GRANULOCYTES # BLD AUTO: 0.01 K/UL
IMM GRANULOCYTES # BLD AUTO: 0.02 K/UL
IMM GRANULOCYTES NFR BLD AUTO: 0.5 %
IMM GRANULOCYTES NFR BLD AUTO: 0.8 %
KETONES UR QL STRIP: ABNORMAL
LEUKOCYTE ESTERASE UR QL STRIP: NEGATIVE
LIPASE SERPL-CCNC: <3 U/L
LYMPHOCYTES # BLD AUTO: 0.6 K/UL
LYMPHOCYTES # BLD AUTO: 0.8 K/UL
LYMPHOCYTES NFR BLD: 24.8 %
LYMPHOCYTES NFR BLD: 39 %
MAGNESIUM SERPL-MCNC: 1.6 MG/DL
MCH RBC QN AUTO: 31.2 PG
MCH RBC QN AUTO: 31.6 PG
MCHC RBC AUTO-ENTMCNC: 31.8 G/DL
MCHC RBC AUTO-ENTMCNC: 34.3 G/DL
MCV RBC AUTO: 92 FL
MCV RBC AUTO: 98 FL
MICROSCOPIC COMMENT: ABNORMAL
MONOCYTES # BLD AUTO: 0.3 K/UL
MONOCYTES # BLD AUTO: 0.4 K/UL
MONOCYTES NFR BLD: 14.6 %
MONOCYTES NFR BLD: 15.7 %
NEUTROPHILS # BLD AUTO: 0.9 K/UL
NEUTROPHILS # BLD AUTO: 1.3 K/UL
NEUTROPHILS NFR BLD: 41.2 %
NEUTROPHILS NFR BLD: 54.2 %
NITRITE UR QL STRIP: NEGATIVE
NRBC BLD-RTO: 0 /100 WBC
NRBC BLD-RTO: 0 /100 WBC
PH UR STRIP: 6 [PH] (ref 5–8)
PHOSPHATE SERPL-MCNC: 5.2 MG/DL
PLATELET # BLD AUTO: 44 K/UL
PLATELET # BLD AUTO: 47 K/UL
PLATELET BLD QL SMEAR: ABNORMAL
PMV BLD AUTO: 12 FL
PMV BLD AUTO: 12.1 FL
POTASSIUM SERPL-SCNC: 4.6 MMOL/L
PROT SERPL-MCNC: 6 G/DL
PROT UR QL STRIP: ABNORMAL
RBC # BLD AUTO: 2.02 M/UL
RBC # BLD AUTO: 3.07 M/UL
RBC #/AREA URNS AUTO: 1 /HPF (ref 0–4)
SODIUM SERPL-SCNC: 133 MMOL/L
SP GR UR STRIP: 1.01 (ref 1–1.03)
SQUAMOUS #/AREA URNS AUTO: 1 /HPF
TRANS ERYTHROCYTES VOL PATIENT: NORMAL ML
TRANS ERYTHROCYTES VOL PATIENT: NORMAL ML
URN SPEC COLLECT METH UR: ABNORMAL
UROBILINOGEN UR STRIP-ACNC: 2 EU/DL
WBC # BLD AUTO: 2.13 K/UL
WBC # BLD AUTO: 2.42 K/UL
WBC #/AREA URNS AUTO: 3 /HPF (ref 0–5)

## 2018-05-01 PROCEDURE — 36415 COLL VENOUS BLD VENIPUNCTURE: CPT | Mod: NTX

## 2018-05-01 PROCEDURE — 80158 DRUG ASSAY CYCLOSPORINE: CPT | Mod: NTX

## 2018-05-01 PROCEDURE — 20600001 HC STEP DOWN PRIVATE ROOM: Mod: NTX

## 2018-05-01 PROCEDURE — 25000003 PHARM REV CODE 250: Mod: NTX | Performed by: NURSE PRACTITIONER

## 2018-05-01 PROCEDURE — 99233 SBSQ HOSP IP/OBS HIGH 50: CPT | Mod: NTX,,, | Performed by: NURSE PRACTITIONER

## 2018-05-01 PROCEDURE — 83690 ASSAY OF LIPASE: CPT | Mod: NTX

## 2018-05-01 PROCEDURE — 87086 URINE CULTURE/COLONY COUNT: CPT | Mod: NTX

## 2018-05-01 PROCEDURE — 63600175 PHARM REV CODE 636 W HCPCS: Mod: NTX | Performed by: NURSE PRACTITIONER

## 2018-05-01 PROCEDURE — 81001 URINALYSIS AUTO W/SCOPE: CPT | Mod: NTX

## 2018-05-01 PROCEDURE — P9021 RED BLOOD CELLS UNIT: HCPCS | Mod: NTX

## 2018-05-01 PROCEDURE — 82150 ASSAY OF AMYLASE: CPT | Mod: NTX

## 2018-05-01 PROCEDURE — 84100 ASSAY OF PHOSPHORUS: CPT | Mod: NTX

## 2018-05-01 PROCEDURE — 90935 HEMODIALYSIS ONE EVALUATION: CPT | Mod: NTX

## 2018-05-01 PROCEDURE — 83735 ASSAY OF MAGNESIUM: CPT | Mod: NTX

## 2018-05-01 PROCEDURE — 85025 COMPLETE CBC W/AUTO DIFF WBC: CPT | Mod: 91,NTX

## 2018-05-01 PROCEDURE — 85018 HEMOGLOBIN: CPT | Mod: NTX

## 2018-05-01 PROCEDURE — 85014 HEMATOCRIT: CPT | Mod: NTX

## 2018-05-01 PROCEDURE — 80053 COMPREHEN METABOLIC PANEL: CPT | Mod: NTX

## 2018-05-01 RX ORDER — FENTANYL CITRATE 50 UG/ML
25 INJECTION, SOLUTION INTRAMUSCULAR; INTRAVENOUS ONCE
Status: COMPLETED | OUTPATIENT
Start: 2018-05-01 | End: 2018-05-01

## 2018-05-01 RX ORDER — HYDROMORPHONE HYDROCHLORIDE 1 MG/ML
0.5 INJECTION, SOLUTION INTRAMUSCULAR; INTRAVENOUS; SUBCUTANEOUS EVERY 4 HOURS PRN
Status: DISCONTINUED | OUTPATIENT
Start: 2018-05-01 | End: 2018-05-01

## 2018-05-01 RX ORDER — HEPARIN SODIUM 1000 [USP'U]/ML
1000 INJECTION, SOLUTION INTRAVENOUS; SUBCUTANEOUS
Status: DISCONTINUED | OUTPATIENT
Start: 2018-05-01 | End: 2018-05-04 | Stop reason: HOSPADM

## 2018-05-01 RX ORDER — OXYCODONE HYDROCHLORIDE 5 MG/1
10 TABLET ORAL EVERY 4 HOURS PRN
Status: DISCONTINUED | OUTPATIENT
Start: 2018-05-01 | End: 2018-05-04 | Stop reason: HOSPADM

## 2018-05-01 RX ORDER — PROCHLORPERAZINE EDISYLATE 5 MG/ML
10 INJECTION INTRAMUSCULAR; INTRAVENOUS EVERY 6 HOURS PRN
Status: DISCONTINUED | OUTPATIENT
Start: 2018-05-01 | End: 2018-05-04 | Stop reason: HOSPADM

## 2018-05-01 RX ORDER — HYDROCODONE BITARTRATE AND ACETAMINOPHEN 500; 5 MG/1; MG/1
TABLET ORAL
Status: DISCONTINUED | OUTPATIENT
Start: 2018-05-01 | End: 2018-05-04

## 2018-05-01 RX ORDER — FENTANYL CITRATE 50 UG/ML
INJECTION, SOLUTION INTRAMUSCULAR; INTRAVENOUS
Status: DISPENSED
Start: 2018-05-01 | End: 2018-05-01

## 2018-05-01 RX ORDER — SODIUM CHLORIDE 9 MG/ML
INJECTION, SOLUTION INTRAVENOUS ONCE
Status: COMPLETED | OUTPATIENT
Start: 2018-05-01 | End: 2018-05-01

## 2018-05-01 RX ADMIN — LEVETIRACETAM 500 MG: 500 TABLET, FILM COATED ORAL at 11:05

## 2018-05-01 RX ADMIN — PROMETHAZINE HYDROCHLORIDE 6.25 MG: 25 INJECTION INTRAMUSCULAR; INTRAVENOUS at 05:05

## 2018-05-01 RX ADMIN — TBO-FILGRASTIM 300 MCG: 300 INJECTION, SOLUTION SUBCUTANEOUS at 03:05

## 2018-05-01 RX ADMIN — LEVOTHYROXINE SODIUM 75 MCG: 75 TABLET ORAL at 05:05

## 2018-05-01 RX ADMIN — ERGOCALCIFEROL 50000 UNITS: 1.25 CAPSULE ORAL at 11:05

## 2018-05-01 RX ADMIN — LEVETIRACETAM 500 MG: 500 TABLET, FILM COATED ORAL at 09:05

## 2018-05-01 RX ADMIN — OXYCODONE HYDROCHLORIDE 10 MG: 5 TABLET ORAL at 07:05

## 2018-05-01 RX ADMIN — DRONABINOL 2.5 MG: 2.5 CAPSULE ORAL at 12:05

## 2018-05-01 RX ADMIN — OXYCODONE HYDROCHLORIDE 10 MG: 5 TABLET ORAL at 12:05

## 2018-05-01 RX ADMIN — ONDANSETRON 8 MG: 8 TABLET, ORALLY DISINTEGRATING ORAL at 03:05

## 2018-05-01 RX ADMIN — SODIUM CHLORIDE: 0.9 INJECTION, SOLUTION INTRAVENOUS at 07:05

## 2018-05-01 RX ADMIN — CYCLOSPORINE 100 MG: 100 CAPSULE, LIQUID FILLED ORAL at 05:05

## 2018-05-01 RX ADMIN — PROCHLORPERAZINE EDISYLATE 10 MG: 5 INJECTION INTRAMUSCULAR; INTRAVENOUS at 10:05

## 2018-05-01 RX ADMIN — HEPARIN SODIUM 1000 UNITS: 1000 INJECTION, SOLUTION INTRAVENOUS; SUBCUTANEOUS at 10:05

## 2018-05-01 RX ADMIN — BISACODYL 10 MG: 5 TABLET, COATED ORAL at 05:05

## 2018-05-01 RX ADMIN — OXYCODONE HYDROCHLORIDE 10 MG: 5 TABLET ORAL at 10:05

## 2018-05-01 RX ADMIN — URSODIOL 300 MG: 300 CAPSULE ORAL at 12:05

## 2018-05-01 RX ADMIN — FENTANYL CITRATE 25 MCG: 50 INJECTION, SOLUTION INTRAMUSCULAR; INTRAVENOUS at 11:05

## 2018-05-01 RX ADMIN — PANTOPRAZOLE SODIUM 40 MG: 40 TABLET, DELAYED RELEASE ORAL at 05:05

## 2018-05-01 RX ADMIN — URSODIOL 300 MG: 300 CAPSULE ORAL at 11:05

## 2018-05-01 RX ADMIN — CYCLOSPORINE 100 MG: 100 CAPSULE, LIQUID FILLED ORAL at 12:05

## 2018-05-01 RX ADMIN — HYDROMORPHONE HYDROCHLORIDE 0.5 MG: 1 INJECTION, SOLUTION INTRAMUSCULAR; INTRAVENOUS; SUBCUTANEOUS at 01:05

## 2018-05-01 RX ADMIN — CYCLOSPORINE 75 MG: 25 CAPSULE, LIQUID FILLED ORAL at 05:05

## 2018-05-01 RX ADMIN — OXYCODONE HYDROCHLORIDE 10 MG: 5 TABLET ORAL at 06:05

## 2018-05-01 RX ADMIN — ONDANSETRON 8 MG: 8 TABLET, ORALLY DISINTEGRATING ORAL at 10:05

## 2018-05-01 RX ADMIN — HYDROMORPHONE HYDROCHLORIDE 0.5 MG: 1 INJECTION, SOLUTION INTRAMUSCULAR; INTRAVENOUS; SUBCUTANEOUS at 05:05

## 2018-05-01 NOTE — NURSING TRANSFER
Nursing Transfer Note      5/1/2018     Transfer To: [id number]    Transfer via stretcher    Transfer with cell phone    Transported by Transport    Medicines sent: none    Chart send with patient: Yes    Notified: pt will notify mother upon arrival to room    Patient reassessed at: 5/1/2018 see flowsheet (date, time)

## 2018-05-01 NOTE — PROGRESS NOTES
Received call from Lab regarding critical hematocrit - notified Shereen Mcguire DNP. Orders received: Transfuse 2 Units PRBCs.    Results for RAFFY THOMAS (MRN [id number]) as of 5/1/2018 06:50   Ref. Range 5/1/2018 05:31   Hemoglobin Latest Ref Range: 14.0 - 18.0 g/dL 6.3 (L)   Hematocrit Latest Ref Range: 40.0 - 54.0 % 19.8 (LL)

## 2018-05-01 NOTE — PROGRESS NOTES
[facility]  Liver Transplant  Progress Note    Patient Name: Jhonny Diana  MRN: [id number]  Admission Date: 2018  Hospital Length of Stay: 1 days  Code Status: Full Code  Primary Care Provider: Primary Doctor No  Post-Operative Day: 194    ORGAN:   LIVER  Disease Etiology: Acute Alcoholic Hepatitis  Donor Type:    - Brain Death  CDC High Risk:   No  Donor CMV Status:   Donor CMV Status: Positive  Donor HBcAB:   Negative  Donor HCV Status:   Negative  Whole or Partial: Whole Liver  Biliary Anastomosis: End to End  Arterial Anatomy: Standard  Subjective:     History of Present Illness:  28 y/o male with pmh of esrd 2/2 ETOH cirrhosis.  S/p oltx 10/19/2017 (Liver) with steroid induction.  Post op course with multiple complications to include biliary stricture, FUO, esrd, and chronic abdominal pain.  Underwent outpatient ERCP for rise in bilirubin and abdominal pain.  Post ERCP with persistent pain.  Will admit for pain treatment and pancreatitis workup.    Hospital Course:  Interval history: continues to have abdominal pain despite pain medications.  Nephrology consulted for HD management.  Received dialysis this am with transfusion of 2 units prbc.  Will plan to recheck blood counts post transfusion.  Complaining of abdominal pain and swelling this afternoon.  Will obtain KUB to assess.    Past Medical History:   Diagnosis Date    Alcoholic hepatitis with ascites     Alcoholic hepatitis with ascites     ESRD on dialysis     History of hematemesis 2017    Hypertension     Renal disorder     Seizures        Past Surgical History:   Procedure Laterality Date    APPENDECTOMY      ERCP      ESOPHAGOGASTRODUODENOSCOPY      LIVER TRANSPLANT         Review of patient's allergies indicates:   Allergen Reactions    Bactrim [sulfamethoxazole-trimethoprim] Other (See Comments)     Mookie Trell Syndrome       Family History     Problem Relation (Age of Onset)    No Known Problems  Mother        Social History Main Topics    Smoking status: Former Smoker     Packs/day: 1.00     Years: 10.00     Types: Cigarettes     Start date: 2/19/2017    Smokeless tobacco: Never Used    Alcohol use No      Comment: a fifth of liquor daily for years, cut back over last 2 months    Drug use: No    Sexual activity: Not on file       PTA Medications   Medication Sig    amoxicillin-clavulanate 875-125mg (AUGMENTIN) 875-125 mg per tablet Take 1 tablet by mouth every 12 (twelve) hours.    bisacodyl (DULCOLAX) 5 mg EC tablet Take 2 tablets (10 mg total) by mouth once daily.    cycloSPORINE modified, NEORAL, (NEORAL) 100 MG capsule Take 1 capsule (100 mg total) by mouth 2 (two) times daily.    cycloSPORINE modified, NEORAL, 25 MG capsule Take 3 capsules (75 mg total) by mouth 2 (two) times daily.    dronabinol (MARINOL) 2.5 MG capsule Take 1 capsule (2.5 mg total) by mouth every evening.    ergocalciferol (ERGOCALCIFEROL) 50,000 unit Cap Take 1 capsule (50,000 Units total) by mouth every 7 days.    levETIRAcetam (KEPPRA) 500 MG Tab Take 1 tablet (500 mg total) by mouth 2 (two) times daily.    levothyroxine (SYNTHROID) 75 MCG tablet Take 1 tablet (75 mcg total) by mouth before breakfast.    metoclopramide HCl (REGLAN) 10 MG tablet Take 1 tablet (10 mg total) by mouth 4 (four) times daily before meals and nightly.    ondansetron (ZOFRAN-ODT) 8 MG TbDL Take 1 tablet (8 mg total) by mouth every 8 (eight) hours as needed (nausea).    oxyCODONE (ROXICODONE) 10 mg Tab immediate release tablet Take 0.5-1 tablets (5-10 mg total) by mouth every 4 (four) hours as needed for Pain.    sevelamer carbonate (RENVELA) 800 mg Tab Take 1 tablet (800 mg total) by mouth 3 (three) times daily with meals.    ursodiol (ACTIGALL) 300 mg capsule Take 1 capsule (300 mg total) by mouth 2 (two) times daily.       Review of Systems   Constitutional: Positive for activity change, appetite change and fatigue. Negative for fever.    Respiratory: Negative for cough and shortness of breath.    Cardiovascular: Negative for chest pain and leg swelling.   Gastrointestinal: Positive for abdominal pain, constipation, nausea and vomiting.   Allergic/Immunologic: Positive for immunocompromised state.   Neurological: Positive for weakness.     Objective:     Vital Signs (Most Recent):  Temp: 98.2 °F (36.8 °C) (05/01/18 1132)  Pulse: 100 (05/01/18 1132)  Resp: 18 (05/01/18 1132)  BP: (!) 157/99 (05/01/18 1132)  SpO2: 98 % (05/01/18 1132) Vital Signs (24h Range):  Temp:  [97.9 °F (36.6 °C)-99.5 °F (37.5 °C)] 98.2 °F (36.8 °C)  Pulse:  [] 100  Resp:  [14-22] 18  SpO2:  [96 %-100 %] 98 %  BP: (132-171)/() 157/99     Weight: 64.1 kg (141 lb 5 oz)  Body mass index is 22.81 kg/m².      Intake/Output Summary (Last 24 hours) at 05/01/18 1249  Last data filed at 05/01/18 1100   Gross per 24 hour   Intake             1350 ml   Output             1350 ml   Net                0 ml       Physical Exam   Constitutional: He is oriented to person, place, and time. He appears well-developed.   Cachectic, hand and temporal muscle wasting   HENT:   Head: Normocephalic and atraumatic.   Eyes: Pupils are equal, round, and reactive to light. Scleral icterus is present.   Neck: Normal range of motion.   Cardiovascular: Normal rate, regular rhythm, normal heart sounds and intact distal pulses.    Pulmonary/Chest: Effort normal and breath sounds normal.   Abdominal: Soft. Bowel sounds are normal. He exhibits no distension. There is tenderness.   Musculoskeletal: Normal range of motion. He exhibits no edema.   Neurological: He is alert and oriented to person, place, and time.   Skin: Skin is warm and dry.   Psychiatric: He has a normal mood and affect. His behavior is normal. Judgment and thought content normal.   Nursing note and vitals reviewed.      Laboratory:  CBC:     Recent Labs  Lab 05/01/18  0531   WBC 2.13*   RBC 2.02*   HGB 6.3*   HCT 19.8*   PLT 44*   MCV  98   MCH 31.2*   MCHC 31.8*     CMP:     Recent Labs  Lab 05/01/18  0531   GLU 53*   CALCIUM 8.6*   ALBUMIN 2.3*   PROT 6.0   *   K 4.6   CO2 20*      BUN 36*   CREATININE 5.9*   ALKPHOS 127   ALT <5*   AST 21   BILITOT 2.5*     Labs within the past 24 hours have been reviewed.    Diagnostic Results:  None    Assessment/Plan:     * Biliary stricture of transplanted liver    - underwent outpatient ercp   - A previously placed stent had partially migrated into the biliary tree. The distal portion of the stent was 1 mm  above the biliary os. This was adjusted by pulling the stent partially out of the bile duct.   - The biliary tree was swept and sludge was found.  - more abdominal pain, work up for pancreatitis negative        Severe protein-calorie malnutrition    - continue marinol for appetite stimulant  - encourage po intake          Acute renal failure with tubular necrosis    - consult nephrology to manage dialysis  - HD 5/1 with bld transfusion        S/P liver transplant    - LFTs stable  - bilirubin elevated, underwent ercp see above        Long-term use of immunosuppressant medication    - continue cyclosporine, monitor cya levels daily and adjust for therapeutic dose        Prophylactic immunotherapy    - see long term immunosuppression        Anemia of chronic disease    - h/h low  - transfused 2 units 5/1  - recheck this pm        At risk for opportunistic infections              Nausea and vomiting    - zofran and phenergan ordered for symptom control              VTE Risk Mitigation         Ordered     heparin (porcine) injection 1,000 Units  As needed (PRN)      05/01/18 0943     heparin (porcine) injection 5,000 Units  Every 8 hours      04/30/18 1227     IP VTE HIGH RISK PATIENT  Once      04/30/18 1227          The patients clinical status was discussed at multidisplinary rounds, involving transplant surgery, transplant medicine, pharmacy, nursing, nutrition, and social work    Discharge  Planning:  Monitor kidney function --> may need outpt HD  Monitor HH needs vs rehab for deconditioned status      Seda Camacho, NP  Liver Transplant  [facility]

## 2018-05-01 NOTE — PLAN OF CARE
Problem: Patient Care Overview  Goal: Plan of Care Review  Outcome: Ongoing (interventions implemented as appropriate)  Maintenance dialysis: patient completed 3 hours of dialysis, NO UR REMOVED as ordered, transfused 2 units of RBC, blood returned. Right IJ permanent catheter flushed with Normal Saline, locked with Heparin.   Patient complained of severe abdominal pain. Spoke with the nurse practitioner and she said the team will assess the patient.  Report given to primary nurse.    -transported back to [facility] via wheelchair by transporter.

## 2018-05-01 NOTE — ASSESSMENT & PLAN NOTE
- underwent outpatient ercp   - A previously placed stent had partially migrated into the biliary tree. The distal portion of the stent was 1 mm  above the biliary os. This was adjusted by pulling the stent partially out of the bile duct.   - The biliary tree was swept and sludge was found.  - more abdominal pain, work up for pancreatitis negative

## 2018-05-01 NOTE — PROGRESS NOTES
Maintenance dialysis: Patient came in per stretcher via transporter, not in distress,  with right IJ permanent catheter intact. No bleeding, with good flow on both Arterial and venous ports,    HD started, no UF as ordered.

## 2018-05-01 NOTE — PROGRESS NOTES
While getting report on patient patient transported down to dialysis per stretcher, no acute distress noted at this time.  Pain meds given by RACH Robert RN prior to transport.  Report called by Juhi SHEEHAN to Raul SHEEHAN in dialysis.  Noted that 2 units PRBC were ordered, consent performed.  Will monitor for patients return.

## 2018-05-01 NOTE — HOSPITAL COURSE
Interval history: No acute events overnight. Abdominal pain improving. CT a/p reviewed with surgeons. Constipation and ascites noted. Pt had BM with brown bomb enema. Paracentesis scheduled today to r/o peritonitis. Again explained the need to follow up with chronic pain management clinic for treatment. Monitor.

## 2018-05-01 NOTE — PLAN OF CARE
Problem: Patient Care Overview  Goal: Plan of Care Review  Outcome: Ongoing (interventions implemented as appropriate)  Patient aao x 4 today.  Complains of 10/10 abd pain at all times. Abd xray normal. Pancreatic enzymes not elevated.  Patient is receiving oxycodone oral Q 4. Received dilaudid IV overnight, received fentanyl IV once today; patient reports no relief.  Received 2 units of PRBC in dialysis this am for low H/H, repeat H/H 9.7/28.3; occult stool ordered, waiting for BM to collect. 0ccs of fluid removed in dialysis.  Patient scheduled for CT of abd and pelvis today. Patients mother at the bedside attentive to patient involved in patients care.

## 2018-05-01 NOTE — SUBJECTIVE & OBJECTIVE
Past Medical History:   Diagnosis Date    Alcoholic hepatitis with ascites     Alcoholic hepatitis with ascites     ESRD on dialysis     History of hematemesis 9/28/2017    Hypertension     Renal disorder     Seizures        Past Surgical History:   Procedure Laterality Date    APPENDECTOMY      ERCP      ESOPHAGOGASTRODUODENOSCOPY      LIVER TRANSPLANT         Review of patient's allergies indicates:   Allergen Reactions    Bactrim [sulfamethoxazole-trimethoprim] Other (See Comments)     Mookie Trell Syndrome       Family History     Problem Relation (Age of Onset)    No Known Problems Mother        Social History Main Topics    Smoking status: Former Smoker     Packs/day: 1.00     Years: 10.00     Types: Cigarettes     Start date: 2/19/2017    Smokeless tobacco: Never Used    Alcohol use No      Comment: a fifth of liquor daily for years, cut back over last 2 months    Drug use: No    Sexual activity: Not on file       PTA Medications   Medication Sig    amoxicillin-clavulanate 875-125mg (AUGMENTIN) 875-125 mg per tablet Take 1 tablet by mouth every 12 (twelve) hours.    bisacodyl (DULCOLAX) 5 mg EC tablet Take 2 tablets (10 mg total) by mouth once daily.    cycloSPORINE modified, NEORAL, (NEORAL) 100 MG capsule Take 1 capsule (100 mg total) by mouth 2 (two) times daily.    cycloSPORINE modified, NEORAL, 25 MG capsule Take 3 capsules (75 mg total) by mouth 2 (two) times daily.    dronabinol (MARINOL) 2.5 MG capsule Take 1 capsule (2.5 mg total) by mouth every evening.    ergocalciferol (ERGOCALCIFEROL) 50,000 unit Cap Take 1 capsule (50,000 Units total) by mouth every 7 days.    levETIRAcetam (KEPPRA) 500 MG Tab Take 1 tablet (500 mg total) by mouth 2 (two) times daily.    levothyroxine (SYNTHROID) 75 MCG tablet Take 1 tablet (75 mcg total) by mouth before breakfast.    metoclopramide HCl (REGLAN) 10 MG tablet Take 1 tablet (10 mg total) by mouth 4 (four) times daily before meals and  nightly.    ondansetron (ZOFRAN-ODT) 8 MG TbDL Take 1 tablet (8 mg total) by mouth every 8 (eight) hours as needed (nausea).    oxyCODONE (ROXICODONE) 10 mg Tab immediate release tablet Take 0.5-1 tablets (5-10 mg total) by mouth every 4 (four) hours as needed for Pain.    sevelamer carbonate (RENVELA) 800 mg Tab Take 1 tablet (800 mg total) by mouth 3 (three) times daily with meals.    ursodiol (ACTIGALL) 300 mg capsule Take 1 capsule (300 mg total) by mouth 2 (two) times daily.       Review of Systems   Constitutional: Positive for activity change, appetite change and fatigue. Negative for fever.   Respiratory: Negative for cough and shortness of breath.    Cardiovascular: Negative for chest pain and leg swelling.   Gastrointestinal: Positive for abdominal pain, constipation, nausea and vomiting.   Allergic/Immunologic: Positive for immunocompromised state.   Neurological: Positive for weakness.     Objective:     Vital Signs (Most Recent):  Temp: 98.2 °F (36.8 °C) (05/01/18 1132)  Pulse: 100 (05/01/18 1132)  Resp: 18 (05/01/18 1132)  BP: (!) 157/99 (05/01/18 1132)  SpO2: 98 % (05/01/18 1132) Vital Signs (24h Range):  Temp:  [97.9 °F (36.6 °C)-99.5 °F (37.5 °C)] 98.2 °F (36.8 °C)  Pulse:  [] 100  Resp:  [14-22] 18  SpO2:  [96 %-100 %] 98 %  BP: (132-171)/() 157/99     Weight: 64.1 kg (141 lb 5 oz)  Body mass index is 22.81 kg/m².      Intake/Output Summary (Last 24 hours) at 05/01/18 1249  Last data filed at 05/01/18 1100   Gross per 24 hour   Intake             1350 ml   Output             1350 ml   Net                0 ml       Physical Exam   Constitutional: He is oriented to person, place, and time. He appears well-developed.   Cachectic, hand and temporal muscle wasting   HENT:   Head: Normocephalic and atraumatic.   Eyes: Pupils are equal, round, and reactive to light. Scleral icterus is present.   Neck: Normal range of motion.   Cardiovascular: Normal rate, regular rhythm, normal heart  sounds and intact distal pulses.    Pulmonary/Chest: Effort normal and breath sounds normal.   Abdominal: Soft. Bowel sounds are normal. He exhibits no distension. There is tenderness.   Musculoskeletal: Normal range of motion. He exhibits no edema.   Neurological: He is alert and oriented to person, place, and time.   Skin: Skin is warm and dry.   Psychiatric: He has a normal mood and affect. His behavior is normal. Judgment and thought content normal.   Nursing note and vitals reviewed.      Laboratory:  CBC:     Recent Labs  Lab 05/01/18  0531   WBC 2.13*   RBC 2.02*   HGB 6.3*   HCT 19.8*   PLT 44*   MCV 98   MCH 31.2*   MCHC 31.8*     CMP:     Recent Labs  Lab 05/01/18  0531   GLU 53*   CALCIUM 8.6*   ALBUMIN 2.3*   PROT 6.0   *   K 4.6   CO2 20*      BUN 36*   CREATININE 5.9*   ALKPHOS 127   ALT <5*   AST 21   BILITOT 2.5*     Labs within the past 24 hours have been reviewed.    Diagnostic Results:  None

## 2018-05-01 NOTE — PROGRESS NOTES
Admit Note     Met with patient to assess needs. Patient is a 27 y.o. single male, admitted for post liver transplantation on 10/19/2017; admit after ERCP/biopsy done yesterday..      Patient admitted from facility after procedure on 4/30/2018 .  At this time, patient presents as alert and oriented x 4, pleasant, calm, communicative, cooperative and in pain..  At this time, patients caregiver presents as not present, mom at local lodging.  SW left business card and Transplant Support Group Flyer for pt and pts mom at bedside.    Household/Family Systems     Patient resides with patient's mother, at [address]  [place] [address].  Patient no longer has his apartment in [place].   Support system includes pts mom Sanjuanita Diana.  Patient does have support from his sister Miranda and aunt Sophy if needed.  Patient does not have dependents that are need of being cared for.     Patients primary caregiver is Sanjuanita Diana, patients mother, phone number [phone].  Confirmed patients contact information is [phone] (home);   Telephone Information:   Mobile [phone]   .    During admission, patient's caregiver plans to stay in patient's room.  Confirmed patient and patients caregivers do have access to reliable transportation.    Cognitive Status/Learning     Patient reports reading ability as college and states patient does have difficulty with comprehension and concentration due to pain.  Patient reports patient learns best by written and verbal information and demonstration.   Needed: No.   Highest education level: Attended College/Technical School    Vocation/Disability   .  Working for Income: No  If no, reason not working: Demands of Treatment  Patient is in between STD and LTD with his employer [employer].  Patient was a Salesman with them.  Chip has continued to pay for pts medical insurance..Patient reports no income as LTD is pending, they are waiting on a transfer,  which should have happened in 2018.      Adherence     Patient reports a high level of adherence to patients health care regimen.  Adherence counseling and education provided. Patient verbalizes understanding.    Substance Use    Patient reports the following substance usage.    Tobacco: none, patient denies any use.  Alcohol: none, patient denies any use. Patient's last ETOH use was 2017 when he stopped with diagnosis of liver disease.  Patient drank heavily in the past. Patient has not enrolled in [facility] due to continued medical complications.  Patient's mother had been in communication with [facility] in the past.  Illicit Drugs/Non-prescribed Medications: none, patient denies any use.  In  addiction to Lortab.   Patient states clear understanding of the potential impact of substance use.  Substance abstinence/cessation counseling, education and resources provided and reviewed.     Services Utilizing/ADLS    Infusion Service: Prior to admission, patient utilizing? no  Home Health: Prior to admission, patient utilizing? no , has used [facility] in past   Outpatient Physical Rehab: [facility], pt had to cancel first appointment and never rescheduled   DME: Prior to admission, yes RW and WC  Pulmonary/Cardiac Rehab: Prior to admission, no  Dialysis:  Prior to admission, yes [facility] [address] [phone] (is considered chronic) MF at 3:30 pm   Transplant Specialty Pharmacy:  Prior to admission, yes; [facility].    Prior to admission, patient reports patient was independent with ADLS and was not driving.  Patient reports patient is not able to care for self at this time due to compromised medical condition (as documented in medical record) and physical weakness..  Patient indicates a willingness to care for self once medically cleared to do so.    Insurance/Medications    Insured by   Payor/Plan Subscr  Sex Relation Sub. Ins. ID Effective Group Num   1. [facility]* RAFFY THOMAS 1990 Male  [id number]  7/1/17 [id number]                                   [address]   2. [facility]* RAFFY DIANA 1990 Male  [id number] 9/28/17 [id number]                                   [address] [address]      Primary Insurance (for UNOS reporting): Private Insurance  Secondary Insurance (for UNOS reporting): None    Patient reports patient is able to obtain and afford medications at this time and at time of discharge.    Living Will/Healthcare Power of     Patient states patient has a LW and/or HCPA.   provided education regarding LW and HCPA and the completion of forms.    Coping/Mental Health    Patient is coping well with the aid of  family members. Patient has diffuculty with pain and medical complications since transplant.  Pt reports he just pushes through. SW provided liver transplant support group information for patient today.  Patient not receiving any psychotherapy or medication management, he has been able to manage symptoms on his own.  Patient denies mental health difficulties.     Discharge Planning    At time of discharge, patient plans to return to [employer] apartments under the care of mother Sanjuanita Diana.  Patients mother will transport patient.  Per rounds today, expected discharge date is possible for Wednesday or Thursday this week. Patient and patients caregiver  verbalize understanding and are involved in treatment planning and discharge process.    Additional Concerns    Patient is being followed for needs, education, resources, information, emotional support, supportive counseling, and for supportive and skilled discharge plan of care.  providing ongoing psychosocial support, education, resources and d/c planning as needed.  SW remains available. Patient denies additional needs and/or concerns at this time. Patient verbalizes understanding and agreement with information reviewed, social work availability, and how to access available resources as needed.  Patient asking for  nurse, the fentanyl he was given is not working and he was asking for more pain medications.  SW communicated this information with pts bedside nurse Khadijah.

## 2018-05-02 ENCOUNTER — TELEPHONE (OUTPATIENT)
Dept: GASTROENTEROLOGY | Facility: CLINIC | Age: 28
End: 2018-05-02

## 2018-05-02 DIAGNOSIS — K83.1 BILIARY STRICTURE: Primary | ICD-10-CM

## 2018-05-02 LAB
ALBUMIN SERPL BCP-MCNC: 2.3 G/DL
ALP SERPL-CCNC: 140 U/L
ALT SERPL W/O P-5'-P-CCNC: 5 U/L
ANION GAP SERPL CALC-SCNC: 15 MMOL/L
AST SERPL-CCNC: 23 U/L
BACTERIA UR CULT: NO GROWTH
BASOPHILS # BLD AUTO: 0.03 K/UL
BASOPHILS NFR BLD: 0.4 %
BILIRUB SERPL-MCNC: 3 MG/DL
BUN SERPL-MCNC: 21 MG/DL
CALCIUM SERPL-MCNC: 9.3 MG/DL
CHLORIDE SERPL-SCNC: 101 MMOL/L
CO2 SERPL-SCNC: 19 MMOL/L
CREAT SERPL-MCNC: 3.8 MG/DL
CYCLOSPORINE BLD LC/MS/MS-MCNC: 175 NG/ML
DIFFERENTIAL METHOD: ABNORMAL
EOSINOPHIL # BLD AUTO: 0.1 K/UL
EOSINOPHIL NFR BLD: 1 %
ERYTHROCYTE [DISTWIDTH] IN BLOOD BY AUTOMATED COUNT: 14.6 %
EST. GFR  (AFRICAN AMERICAN): 23.5 ML/MIN/1.73 M^2
EST. GFR  (NON AFRICAN AMERICAN): 20.3 ML/MIN/1.73 M^2
GLUCOSE SERPL-MCNC: 55 MG/DL
HCT VFR BLD AUTO: 27.8 %
HGB BLD-MCNC: 9.5 G/DL
IMM GRANULOCYTES # BLD AUTO: 0.03 K/UL
IMM GRANULOCYTES NFR BLD AUTO: 0.4 %
LYMPHOCYTES # BLD AUTO: 1.3 K/UL
LYMPHOCYTES NFR BLD: 17.9 %
MAGNESIUM SERPL-MCNC: 1.4 MG/DL
MCH RBC QN AUTO: 30.7 PG
MCHC RBC AUTO-ENTMCNC: 34.2 G/DL
MCV RBC AUTO: 90 FL
MONOCYTES # BLD AUTO: 0.7 K/UL
MONOCYTES NFR BLD: 10.1 %
NEUTROPHILS # BLD AUTO: 5.1 K/UL
NEUTROPHILS NFR BLD: 70.2 %
NRBC BLD-RTO: 0 /100 WBC
OB PNL STL: NEGATIVE
PHOSPHATE SERPL-MCNC: 3.7 MG/DL
PLATELET # BLD AUTO: 46 K/UL
PMV BLD AUTO: 10.9 FL
POTASSIUM SERPL-SCNC: 4 MMOL/L
PROT SERPL-MCNC: 6.4 G/DL
RBC # BLD AUTO: 3.09 M/UL
SODIUM SERPL-SCNC: 135 MMOL/L
WBC # BLD AUTO: 7.31 K/UL

## 2018-05-02 PROCEDURE — 25000003 PHARM REV CODE 250: Mod: NTX | Performed by: NURSE PRACTITIONER

## 2018-05-02 PROCEDURE — 20600001 HC STEP DOWN PRIVATE ROOM: Mod: NTX

## 2018-05-02 PROCEDURE — 80158 DRUG ASSAY CYCLOSPORINE: CPT | Mod: NTX

## 2018-05-02 PROCEDURE — 83735 ASSAY OF MAGNESIUM: CPT | Mod: NTX

## 2018-05-02 PROCEDURE — 63600175 PHARM REV CODE 636 W HCPCS: Mod: NTX | Performed by: NURSE PRACTITIONER

## 2018-05-02 PROCEDURE — 85025 COMPLETE CBC W/AUTO DIFF WBC: CPT | Mod: NTX

## 2018-05-02 PROCEDURE — 36415 COLL VENOUS BLD VENIPUNCTURE: CPT | Mod: NTX

## 2018-05-02 PROCEDURE — 82272 OCCULT BLD FECES 1-3 TESTS: CPT | Mod: NTX

## 2018-05-02 PROCEDURE — 84100 ASSAY OF PHOSPHORUS: CPT | Mod: NTX

## 2018-05-02 PROCEDURE — 99233 SBSQ HOSP IP/OBS HIGH 50: CPT | Mod: NTX,,, | Performed by: NURSE PRACTITIONER

## 2018-05-02 PROCEDURE — 80053 COMPREHEN METABOLIC PANEL: CPT | Mod: NTX

## 2018-05-02 RX ORDER — SYRING-NEEDL,DISP,INSUL,0.3 ML 29 G X1/2"
296 SYRINGE, EMPTY DISPOSABLE MISCELLANEOUS
Status: COMPLETED | OUTPATIENT
Start: 2018-05-02 | End: 2018-05-02

## 2018-05-02 RX ORDER — PSEUDOEPHEDRINE/ACETAMINOPHEN 30MG-500MG
100 TABLET ORAL
Status: COMPLETED | OUTPATIENT
Start: 2018-05-02 | End: 2018-05-02

## 2018-05-02 RX ORDER — MAGNESIUM SULFATE HEPTAHYDRATE 40 MG/ML
2 INJECTION, SOLUTION INTRAVENOUS ONCE
Status: COMPLETED | OUTPATIENT
Start: 2018-05-02 | End: 2018-05-02

## 2018-05-02 RX ORDER — METOCLOPRAMIDE 10 MG/1
10 TABLET ORAL
Status: DISCONTINUED | OUTPATIENT
Start: 2018-05-02 | End: 2018-05-04 | Stop reason: HOSPADM

## 2018-05-02 RX ADMIN — Medication 100 ML: at 11:05

## 2018-05-02 RX ADMIN — MAGNESIUM SULFATE IN WATER 2 G: 40 INJECTION, SOLUTION INTRAVENOUS at 11:05

## 2018-05-02 RX ADMIN — BISACODYL 10 MG: 5 TABLET, COATED ORAL at 09:05

## 2018-05-02 RX ADMIN — METOCLOPRAMIDE 10 MG: 10 TABLET ORAL at 09:05

## 2018-05-02 RX ADMIN — OXYCODONE HYDROCHLORIDE 10 MG: 5 TABLET ORAL at 03:05

## 2018-05-02 RX ADMIN — PROMETHAZINE HYDROCHLORIDE 6.25 MG: 25 INJECTION INTRAMUSCULAR; INTRAVENOUS at 05:05

## 2018-05-02 RX ADMIN — LEVOTHYROXINE SODIUM 75 MCG: 75 TABLET ORAL at 05:05

## 2018-05-02 RX ADMIN — MAGNESIUM CITRATE 296 ML: 1.75 LIQUID ORAL at 11:05

## 2018-05-02 RX ADMIN — METOCLOPRAMIDE 10 MG: 10 TABLET ORAL at 03:05

## 2018-05-02 RX ADMIN — SODIUM CHLORIDE 500 ML: 9 INJECTION, SOLUTION INTRAVENOUS at 11:05

## 2018-05-02 RX ADMIN — DRONABINOL 2.5 MG: 2.5 CAPSULE ORAL at 09:05

## 2018-05-02 RX ADMIN — PANTOPRAZOLE SODIUM 40 MG: 40 TABLET, DELAYED RELEASE ORAL at 09:05

## 2018-05-02 RX ADMIN — METOCLOPRAMIDE 10 MG: 10 TABLET ORAL at 11:05

## 2018-05-02 RX ADMIN — CYCLOSPORINE 100 MG: 100 CAPSULE, LIQUID FILLED ORAL at 09:05

## 2018-05-02 RX ADMIN — OXYCODONE HYDROCHLORIDE 10 MG: 5 TABLET ORAL at 07:05

## 2018-05-02 RX ADMIN — PROCHLORPERAZINE EDISYLATE 10 MG: 5 INJECTION INTRAMUSCULAR; INTRAVENOUS at 09:05

## 2018-05-02 RX ADMIN — OXYCODONE HYDROCHLORIDE 10 MG: 5 TABLET ORAL at 09:05

## 2018-05-02 RX ADMIN — PROCHLORPERAZINE EDISYLATE 10 MG: 5 INJECTION INTRAMUSCULAR; INTRAVENOUS at 10:05

## 2018-05-02 RX ADMIN — LEVETIRACETAM 500 MG: 500 TABLET, FILM COATED ORAL at 09:05

## 2018-05-02 RX ADMIN — URSODIOL 300 MG: 300 CAPSULE ORAL at 09:05

## 2018-05-02 NOTE — ASSESSMENT & PLAN NOTE
- underwent outpatient ercp   - A previously placed stent had partially migrated into the biliary tree. The distal portion of the stent was 1 mm  above the biliary os. This was adjusted by pulling the stent partially out of the bile duct.   - The biliary tree was swept and sludge was found.  - more abdominal pain, work up for pancreatitis negative  - CT scan with stent

## 2018-05-02 NOTE — PROGRESS NOTES
Administered Zofran prior to am med's, patient took Zofran and vomited 20 minutes after taking Zofran, no relief. Administered compazine, will re attempt am med's after nausea subsides.

## 2018-05-02 NOTE — SUBJECTIVE & OBJECTIVE
Past Medical History:   Diagnosis Date    Alcoholic hepatitis with ascites     Alcoholic hepatitis with ascites     ESRD on dialysis     History of hematemesis 9/28/2017    Hypertension     Renal disorder     Seizures        Past Surgical History:   Procedure Laterality Date    APPENDECTOMY      ERCP      ESOPHAGOGASTRODUODENOSCOPY      LIVER TRANSPLANT         Review of patient's allergies indicates:   Allergen Reactions    Bactrim [sulfamethoxazole-trimethoprim] Other (See Comments)     Mookie Trell Syndrome       Family History     Problem Relation (Age of Onset)    No Known Problems Mother        Social History Main Topics    Smoking status: Former Smoker     Packs/day: 1.00     Years: 10.00     Types: Cigarettes     Start date: 2/19/2017    Smokeless tobacco: Never Used    Alcohol use No      Comment: a fifth of liquor daily for years, cut back over last 2 months    Drug use: No    Sexual activity: Not on file       PTA Medications   Medication Sig    amoxicillin-clavulanate 875-125mg (AUGMENTIN) 875-125 mg per tablet Take 1 tablet by mouth every 12 (twelve) hours.    bisacodyl (DULCOLAX) 5 mg EC tablet Take 2 tablets (10 mg total) by mouth once daily.    cycloSPORINE modified, NEORAL, (NEORAL) 100 MG capsule Take 1 capsule (100 mg total) by mouth 2 (two) times daily.    cycloSPORINE modified, NEORAL, 25 MG capsule Take 3 capsules (75 mg total) by mouth 2 (two) times daily.    dronabinol (MARINOL) 2.5 MG capsule Take 1 capsule (2.5 mg total) by mouth every evening.    ergocalciferol (ERGOCALCIFEROL) 50,000 unit Cap Take 1 capsule (50,000 Units total) by mouth every 7 days.    levETIRAcetam (KEPPRA) 500 MG Tab Take 1 tablet (500 mg total) by mouth 2 (two) times daily.    levothyroxine (SYNTHROID) 75 MCG tablet Take 1 tablet (75 mcg total) by mouth before breakfast.    metoclopramide HCl (REGLAN) 10 MG tablet Take 1 tablet (10 mg total) by mouth 4 (four) times daily before meals and  nightly.    ondansetron (ZOFRAN-ODT) 8 MG TbDL Take 1 tablet (8 mg total) by mouth every 8 (eight) hours as needed (nausea).    oxyCODONE (ROXICODONE) 10 mg Tab immediate release tablet Take 0.5-1 tablets (5-10 mg total) by mouth every 4 (four) hours as needed for Pain.    sevelamer carbonate (RENVELA) 800 mg Tab Take 1 tablet (800 mg total) by mouth 3 (three) times daily with meals.    ursodiol (ACTIGALL) 300 mg capsule Take 1 capsule (300 mg total) by mouth 2 (two) times daily.       Review of Systems   Constitutional: Positive for activity change, appetite change and fatigue. Negative for fever.   Respiratory: Negative for cough and shortness of breath.    Cardiovascular: Negative for chest pain and leg swelling.   Gastrointestinal: Positive for abdominal distention, abdominal pain, constipation, nausea and vomiting.   Genitourinary: Positive for decreased urine volume and flank pain.   Allergic/Immunologic: Positive for immunocompromised state.   Neurological: Positive for weakness.     Objective:     Vital Signs (Most Recent):  Temp: 98.9 °F (37.2 °C) (05/02/18 1115)  Pulse: 86 (05/02/18 1115)  Resp: 16 (05/02/18 1115)  BP: (!) 173/104 (05/02/18 1115)  SpO2: 98 % (05/02/18 1115) Vital Signs (24h Range):  Temp:  [98.6 °F (37 °C)-99.5 °F (37.5 °C)] 98.9 °F (37.2 °C)  Pulse:  [84-95] 86  Resp:  [16-18] 16  SpO2:  [96 %-99 %] 98 %  BP: (145-173)/() 173/104     Weight: 63.4 kg (139 lb 12.4 oz)  Body mass index is 22.56 kg/m².      Intake/Output Summary (Last 24 hours) at 05/02/18 1337  Last data filed at 05/01/18 2300   Gross per 24 hour   Intake              290 ml   Output              225 ml   Net               65 ml       Physical Exam   Constitutional: He is oriented to person, place, and time. He appears well-developed.   Cachectic, hand and temporal muscle wasting   HENT:   Head: Normocephalic and atraumatic.   Eyes: Pupils are equal, round, and reactive to light. Scleral icterus is present.   Neck:  Normal range of motion.   Cardiovascular: Normal rate, regular rhythm, normal heart sounds and intact distal pulses.    Pulmonary/Chest: Effort normal and breath sounds normal.   Abdominal: Soft. Bowel sounds are normal. He exhibits no distension. There is tenderness.   Musculoskeletal: Normal range of motion. He exhibits no edema.   Neurological: He is alert and oriented to person, place, and time.   Skin: Skin is warm and dry.   Psychiatric: He has a normal mood and affect. His behavior is normal. Judgment and thought content normal.   Nursing note and vitals reviewed.      Laboratory:  CBC:     Recent Labs  Lab 05/02/18  0351   WBC 7.31   RBC 3.09*   HGB 9.5*   HCT 27.8*   PLT 46*   MCV 90   MCH 30.7   MCHC 34.2     CMP:     Recent Labs  Lab 05/02/18  0351   GLU 55*   CALCIUM 9.3   ALBUMIN 2.3*   PROT 6.4   *   K 4.0   CO2 19*      BUN 21*   CREATININE 3.8*   ALKPHOS 140*   ALT 5*   AST 23   BILITOT 3.0*     Labs within the past 24 hours have been reviewed.    Diagnostic Results:  CT abd/pelvis 5/1/18:   Few apparent small foci of extraluminal free intraperitoneal air within the right anterior abdomen immediately underlying the abdominal wall musculature.  These are nonspecific and could be iatrogenic from recent procedure, however perforated viscus would be difficult to entirely exclude.  Clinical correlation with recent procedure history as well as physical exam is advised.    2.  Postoperative change of orthotopic liver transplant.  Common bile duct stent with interval development of pneumobilia, presumably relating to recent ERCP/stent manipulation.    3.  Stable moderate to large volume of abdominal ascites.    4.  Moderate volume right-sided pleural effusion with associated right lower lobe compressive atelectasis.    5.  Small stable simple appearing pericardial effusion.    6.  Additional incidental findings as above.

## 2018-05-02 NOTE — PLAN OF CARE
Problem: Patient Care Overview  Goal: Plan of Care Review  Outcome: Ongoing (interventions implemented as appropriate)  Pt is AAOx4 in bed wearing non-skid footwear, bed in low/locked position and with call bell within reach. Pt reminded to use call bell to call for assistance, pt verbalizes understanding. Mother at bedside. Patient had CT of abdomen with contrast at beginning of shift. Patient c/o nausea and vomiting, IV Compazine administered. Patient c/o abdominal pain rated at 8/10, PRN Oxycodone administered. Denies any pain or discomfort at this time.

## 2018-05-02 NOTE — PROGRESS NOTES
[facility]  Liver Transplant  Progress Note    Patient Name: Jhonny Diana  MRN: [id number]  Admission Date: 2018  Hospital Length of Stay: 2 days  Code Status: Full Code  Primary Care Provider: Primary Doctor No  Post-Operative Day: 195    ORGAN:   LIVER  Disease Etiology: Acute Alcoholic Hepatitis  Donor Type:    - Brain Death  CDC High Risk:   No  Donor CMV Status:   Donor CMV Status: Positive  Donor HBcAB:   Negative  Donor HCV Status:   Negative  Whole or Partial: Whole Liver  Biliary Anastomosis: End to End  Arterial Anatomy: Standard  Subjective:     History of Present Illness:  26 y/o male with pmh of esrd 2/2 ETOH cirrhosis.  S/p oltx 10/19/2017 (Liver) with steroid induction.  Post op course with multiple complications to include biliary stricture, FUO, esrd, and chronic abdominal pain.  Underwent outpatient ERCP for rise in bilirubin and abdominal pain.  Post ERCP with persistent pain.  Will admit for pain treatment and pancreatitis workup.    Hospital Course:  Interval history: Continues to complain of abdominal pain and n/v.  CT scan abd/pelvis obtained and reviewed with surgeons.  Constipation noted - brown bomb enema given.  Ct also remarkable for ascites - plan for paracentesis to test for fluid for peritonitis.  Again explained the need to follow up at chronic pain management clinic for treatment.    Nephrology consulted for HD management.  Received dialysis with 2 units prbc transfused  with good response.  Continue to encourage nutrition.    Past Medical History:   Diagnosis Date    Alcoholic hepatitis with ascites     Alcoholic hepatitis with ascites     ESRD on dialysis     History of hematemesis 2017    Hypertension     Renal disorder     Seizures        Past Surgical History:   Procedure Laterality Date    APPENDECTOMY      ERCP      ESOPHAGOGASTRODUODENOSCOPY      LIVER TRANSPLANT         Review of patient's allergies indicates:   Allergen  Reactions    Bactrim [sulfamethoxazole-trimethoprim] Other (See Comments)     Mookie Trell Syndrome       Family History     Problem Relation (Age of Onset)    No Known Problems Mother        Social History Main Topics    Smoking status: Former Smoker     Packs/day: 1.00     Years: 10.00     Types: Cigarettes     Start date: 2/19/2017    Smokeless tobacco: Never Used    Alcohol use No      Comment: a fifth of liquor daily for years, cut back over last 2 months    Drug use: No    Sexual activity: Not on file       PTA Medications   Medication Sig    amoxicillin-clavulanate 875-125mg (AUGMENTIN) 875-125 mg per tablet Take 1 tablet by mouth every 12 (twelve) hours.    bisacodyl (DULCOLAX) 5 mg EC tablet Take 2 tablets (10 mg total) by mouth once daily.    cycloSPORINE modified, NEORAL, (NEORAL) 100 MG capsule Take 1 capsule (100 mg total) by mouth 2 (two) times daily.    cycloSPORINE modified, NEORAL, 25 MG capsule Take 3 capsules (75 mg total) by mouth 2 (two) times daily.    dronabinol (MARINOL) 2.5 MG capsule Take 1 capsule (2.5 mg total) by mouth every evening.    ergocalciferol (ERGOCALCIFEROL) 50,000 unit Cap Take 1 capsule (50,000 Units total) by mouth every 7 days.    levETIRAcetam (KEPPRA) 500 MG Tab Take 1 tablet (500 mg total) by mouth 2 (two) times daily.    levothyroxine (SYNTHROID) 75 MCG tablet Take 1 tablet (75 mcg total) by mouth before breakfast.    metoclopramide HCl (REGLAN) 10 MG tablet Take 1 tablet (10 mg total) by mouth 4 (four) times daily before meals and nightly.    ondansetron (ZOFRAN-ODT) 8 MG TbDL Take 1 tablet (8 mg total) by mouth every 8 (eight) hours as needed (nausea).    oxyCODONE (ROXICODONE) 10 mg Tab immediate release tablet Take 0.5-1 tablets (5-10 mg total) by mouth every 4 (four) hours as needed for Pain.    sevelamer carbonate (RENVELA) 800 mg Tab Take 1 tablet (800 mg total) by mouth 3 (three) times daily with meals.    ursodiol (ACTIGALL) 300 mg capsule  Take 1 capsule (300 mg total) by mouth 2 (two) times daily.       Review of Systems   Constitutional: Positive for activity change, appetite change and fatigue. Negative for fever.   Respiratory: Negative for cough and shortness of breath.    Cardiovascular: Negative for chest pain and leg swelling.   Gastrointestinal: Positive for abdominal distention, abdominal pain, constipation, nausea and vomiting.   Genitourinary: Positive for decreased urine volume and flank pain.   Allergic/Immunologic: Positive for immunocompromised state.   Neurological: Positive for weakness.     Objective:     Vital Signs (Most Recent):  Temp: 98.9 °F (37.2 °C) (05/02/18 1115)  Pulse: 86 (05/02/18 1115)  Resp: 16 (05/02/18 1115)  BP: (!) 173/104 (05/02/18 1115)  SpO2: 98 % (05/02/18 1115) Vital Signs (24h Range):  Temp:  [98.6 °F (37 °C)-99.5 °F (37.5 °C)] 98.9 °F (37.2 °C)  Pulse:  [84-95] 86  Resp:  [16-18] 16  SpO2:  [96 %-99 %] 98 %  BP: (145-173)/() 173/104     Weight: 63.4 kg (139 lb 12.4 oz)  Body mass index is 22.56 kg/m².      Intake/Output Summary (Last 24 hours) at 05/02/18 1337  Last data filed at 05/01/18 2300   Gross per 24 hour   Intake              290 ml   Output              225 ml   Net               65 ml       Physical Exam   Constitutional: He is oriented to person, place, and time. He appears well-developed.   Cachectic, hand and temporal muscle wasting   HENT:   Head: Normocephalic and atraumatic.   Eyes: Pupils are equal, round, and reactive to light. Scleral icterus is present.   Neck: Normal range of motion.   Cardiovascular: Normal rate, regular rhythm, normal heart sounds and intact distal pulses.    Pulmonary/Chest: Effort normal and breath sounds normal.   Abdominal: Soft. Bowel sounds are normal. He exhibits no distension. There is tenderness.   Musculoskeletal: Normal range of motion. He exhibits no edema.   Neurological: He is alert and oriented to person, place, and time.   Skin: Skin is warm and  dry.   Psychiatric: He has a normal mood and affect. His behavior is normal. Judgment and thought content normal.   Nursing note and vitals reviewed.      Laboratory:  CBC:     Recent Labs  Lab 05/02/18  0351   WBC 7.31   RBC 3.09*   HGB 9.5*   HCT 27.8*   PLT 46*   MCV 90   MCH 30.7   MCHC 34.2     CMP:     Recent Labs  Lab 05/02/18  0351   GLU 55*   CALCIUM 9.3   ALBUMIN 2.3*   PROT 6.4   *   K 4.0   CO2 19*      BUN 21*   CREATININE 3.8*   ALKPHOS 140*   ALT 5*   AST 23   BILITOT 3.0*     Labs within the past 24 hours have been reviewed.    Diagnostic Results:  CT abd/pelvis 5/1/18:   Few apparent small foci of extraluminal free intraperitoneal air within the right anterior abdomen immediately underlying the abdominal wall musculature.  These are nonspecific and could be iatrogenic from recent procedure, however perforated viscus would be difficult to entirely exclude.  Clinical correlation with recent procedure history as well as physical exam is advised.    2.  Postoperative change of orthotopic liver transplant.  Common bile duct stent with interval development of pneumobilia, presumably relating to recent ERCP/stent manipulation.    3.  Stable moderate to large volume of abdominal ascites.    4.  Moderate volume right-sided pleural effusion with associated right lower lobe compressive atelectasis.    5.  Small stable simple appearing pericardial effusion.    6.  Additional incidental findings as above.      Assessment/Plan:     * Biliary stricture of transplanted liver    - underwent outpatient ercp   - A previously placed stent had partially migrated into the biliary tree. The distal portion of the stent was 1 mm  above the biliary os. This was adjusted by pulling the stent partially out of the bile duct.   - The biliary tree was swept and sludge was found.  - more abdominal pain, work up for pancreatitis negative  - CT scan with stent        Severe protein-calorie malnutrition    - continue  marinol for appetite stimulant  - encourage po intake          Acute renal failure with tubular necrosis    - consult nephrology to manage dialysis  - HD 5/1 with bld transfusion        S/P liver transplant    - LFTs stable  - bilirubin elevated, underwent ercp see above        Long-term use of immunosuppressant medication    - continue cyclosporine, monitor cya levels daily and adjust for therapeutic dose        Prophylactic immunotherapy    - see long term immunosuppression        Anemia of chronic disease    - h/h low  - transfused 2 units 5/1  - recheck this pm        At risk for opportunistic infections              Nausea and vomiting    - zofran and phenergan ordered for symptom control              VTE Risk Mitigation         Ordered     heparin (porcine) injection 1,000 Units  As needed (PRN)      05/01/18 0943     heparin (porcine) injection 5,000 Units  Every 8 hours      04/30/18 1227     IP VTE HIGH RISK PATIENT  Once      04/30/18 1227          The patients clinical status was discussed at multidisplinary rounds, involving transplant surgery, transplant medicine, pharmacy, nursing, nutrition, and social work    Discharge Planning:  Monitor kidney function --> may need outpt HD  Monitor HH needs vs rehab for deconditioned status      Seda Camacho NP  Liver Transplant  [facility]

## 2018-05-02 NOTE — PROGRESS NOTES
Transport arrived to pick the patient up for IR.  Patient declined going down due to abd cramps from enema. IR notified.  Procedure will be done tomorrow. YADI Camacho NP aware.

## 2018-05-02 NOTE — PLAN OF CARE
Problem: Patient Care Overview  Goal: Plan of Care Review  Outcome: Ongoing (interventions implemented as appropriate)  Patient AAO today.  Main complaint today is nausea.  Zofran with no relief, compazine allowed patient to take am medications. Brown bomb enema with large BM result. Patient declined going down to IR for para today due to abd cramps from enema, will go tomorrow. BP elevated today, suspected pain and nausea.

## 2018-05-03 ENCOUNTER — CONFERENCE (OUTPATIENT)
Dept: TRANSPLANT | Facility: CLINIC | Age: 28
End: 2018-05-03

## 2018-05-03 LAB
ALBUMIN SERPL BCP-MCNC: 2.2 G/DL
ALP SERPL-CCNC: 133 U/L
ALT SERPL W/O P-5'-P-CCNC: <5 U/L
AMYLASE, BODY FLUID: <5 U/L
ANION GAP SERPL CALC-SCNC: 12 MMOL/L
APPEARANCE FLD: CLEAR
AST SERPL-CCNC: 23 U/L
BASOPHILS # BLD AUTO: 0.02 K/UL
BASOPHILS NFR BLD: 0.4 %
BILIRUB FLD-MCNC: 0.8 MG/DL
BILIRUB SERPL-MCNC: 2.7 MG/DL
BODY FLD TYPE: NORMAL
BODY FLUID SOURCE AMYLASE: NORMAL
BODY FLUID SOURCE, BILIRUBIN: NORMAL
BUN SERPL-MCNC: 29 MG/DL
CALCIUM SERPL-MCNC: 9 MG/DL
CHLORIDE SERPL-SCNC: 101 MMOL/L
CO2 SERPL-SCNC: 22 MMOL/L
COLOR FLD: YELLOW
CREAT SERPL-MCNC: 5 MG/DL
CYCLOSPORINE BLD LC/MS/MS-MCNC: 293 NG/ML
DIFFERENTIAL METHOD: ABNORMAL
EOSINOPHIL # BLD AUTO: 0.1 K/UL
EOSINOPHIL NFR BLD: 2 %
ERYTHROCYTE [DISTWIDTH] IN BLOOD BY AUTOMATED COUNT: 14.8 %
EST. GFR  (AFRICAN AMERICAN): 16.8 ML/MIN/1.73 M^2
EST. GFR  (NON AFRICAN AMERICAN): 14.6 ML/MIN/1.73 M^2
GLUCOSE SERPL-MCNC: 61 MG/DL
HCT VFR BLD AUTO: 26 %
HGB BLD-MCNC: 8.7 G/DL
IMM GRANULOCYTES # BLD AUTO: 0.05 K/UL
IMM GRANULOCYTES NFR BLD AUTO: 0.9 %
LYMPHOCYTES # BLD AUTO: 1.2 K/UL
LYMPHOCYTES NFR BLD: 21 %
LYMPHOCYTES NFR FLD MANUAL: 17 %
MAGNESIUM SERPL-MCNC: 1.8 MG/DL
MCH RBC QN AUTO: 31.2 PG
MCHC RBC AUTO-ENTMCNC: 33.5 G/DL
MCV RBC AUTO: 93 FL
MONOCYTES # BLD AUTO: 0.5 K/UL
MONOCYTES NFR BLD: 8.8 %
MONOS+MACROS NFR FLD MANUAL: 82 %
NEUTROPHILS # BLD AUTO: 3.7 K/UL
NEUTROPHILS NFR BLD: 66.9 %
NEUTROPHILS NFR FLD MANUAL: 1 %
NRBC BLD-RTO: 0 /100 WBC
PHOSPHATE SERPL-MCNC: 4 MG/DL
PLATELET # BLD AUTO: 36 K/UL
PMV BLD AUTO: 12.7 FL
POTASSIUM SERPL-SCNC: 4.2 MMOL/L
PROT SERPL-MCNC: 6.2 G/DL
RBC # BLD AUTO: 2.79 M/UL
SODIUM SERPL-SCNC: 135 MMOL/L
WBC # BLD AUTO: 5.56 K/UL
WBC # FLD: 17 /CU MM

## 2018-05-03 PROCEDURE — 87205 SMEAR GRAM STAIN: CPT | Mod: NTX

## 2018-05-03 PROCEDURE — 89051 BODY FLUID CELL COUNT: CPT | Mod: NTX

## 2018-05-03 PROCEDURE — 85025 COMPLETE CBC W/AUTO DIFF WBC: CPT | Mod: NTX

## 2018-05-03 PROCEDURE — 80158 DRUG ASSAY CYCLOSPORINE: CPT | Mod: NTX

## 2018-05-03 PROCEDURE — 87075 CULTR BACTERIA EXCEPT BLOOD: CPT | Mod: NTX

## 2018-05-03 PROCEDURE — 83735 ASSAY OF MAGNESIUM: CPT | Mod: NTX

## 2018-05-03 PROCEDURE — 87070 CULTURE OTHR SPECIMN AEROBIC: CPT | Mod: NTX

## 2018-05-03 PROCEDURE — 84100 ASSAY OF PHOSPHORUS: CPT | Mod: NTX

## 2018-05-03 PROCEDURE — 80053 COMPREHEN METABOLIC PANEL: CPT | Mod: NTX

## 2018-05-03 PROCEDURE — 63600175 PHARM REV CODE 636 W HCPCS: Mod: NTX | Performed by: NURSE PRACTITIONER

## 2018-05-03 PROCEDURE — 20600001 HC STEP DOWN PRIVATE ROOM: Mod: NTX

## 2018-05-03 PROCEDURE — 25000003 PHARM REV CODE 250: Mod: NTX | Performed by: NURSE PRACTITIONER

## 2018-05-03 PROCEDURE — 0W9G3ZX DRAINAGE OF PERITONEAL CAVITY, PERCUTANEOUS APPROACH, DIAGNOSTIC: ICD-10-PCS | Performed by: RADIOLOGY

## 2018-05-03 PROCEDURE — 36415 COLL VENOUS BLD VENIPUNCTURE: CPT | Mod: NTX

## 2018-05-03 PROCEDURE — 99233 SBSQ HOSP IP/OBS HIGH 50: CPT | Mod: NTX,,, | Performed by: NURSE PRACTITIONER

## 2018-05-03 PROCEDURE — 87102 FUNGUS ISOLATION CULTURE: CPT | Mod: NTX

## 2018-05-03 PROCEDURE — 63600175 PHARM REV CODE 636 W HCPCS: Mod: NTX | Performed by: STUDENT IN AN ORGANIZED HEALTH CARE EDUCATION/TRAINING PROGRAM

## 2018-05-03 PROCEDURE — 82247 BILIRUBIN TOTAL: CPT | Mod: NTX

## 2018-05-03 PROCEDURE — 82150 ASSAY OF AMYLASE: CPT | Mod: NTX

## 2018-05-03 RX ORDER — OMEPRAZOLE 40 MG/1
40 CAPSULE, DELAYED RELEASE ORAL
Status: DISCONTINUED | OUTPATIENT
Start: 2018-05-03 | End: 2018-05-04 | Stop reason: HOSPADM

## 2018-05-03 RX ORDER — MIDAZOLAM HYDROCHLORIDE 1 MG/ML
1 INJECTION, SOLUTION INTRAMUSCULAR; INTRAVENOUS ONCE
Status: COMPLETED | OUTPATIENT
Start: 2018-05-03 | End: 2018-05-03

## 2018-05-03 RX ADMIN — LEVETIRACETAM 500 MG: 500 TABLET, FILM COATED ORAL at 09:05

## 2018-05-03 RX ADMIN — OXYCODONE HYDROCHLORIDE 10 MG: 5 TABLET ORAL at 08:05

## 2018-05-03 RX ADMIN — MIDAZOLAM HYDROCHLORIDE 1 MG: 1 INJECTION, SOLUTION INTRAMUSCULAR; INTRAVENOUS at 03:05

## 2018-05-03 RX ADMIN — OMEPRAZOLE 40 MG: 40 CAPSULE, DELAYED RELEASE ORAL at 09:05

## 2018-05-03 RX ADMIN — DRONABINOL 2.5 MG: 2.5 CAPSULE ORAL at 09:05

## 2018-05-03 RX ADMIN — BISACODYL 10 MG: 5 TABLET, COATED ORAL at 08:05

## 2018-05-03 RX ADMIN — CYCLOSPORINE 100 MG: 100 CAPSULE, LIQUID FILLED ORAL at 08:05

## 2018-05-03 RX ADMIN — URSODIOL 300 MG: 300 CAPSULE ORAL at 09:05

## 2018-05-03 RX ADMIN — METOCLOPRAMIDE 10 MG: 10 TABLET ORAL at 06:05

## 2018-05-03 RX ADMIN — URSODIOL 300 MG: 300 CAPSULE ORAL at 08:05

## 2018-05-03 RX ADMIN — CYCLOSPORINE 75 MG: 25 CAPSULE, LIQUID FILLED ORAL at 06:05

## 2018-05-03 RX ADMIN — OXYCODONE HYDROCHLORIDE 10 MG: 5 TABLET ORAL at 10:05

## 2018-05-03 RX ADMIN — METOCLOPRAMIDE 10 MG: 10 TABLET ORAL at 05:05

## 2018-05-03 RX ADMIN — METOCLOPRAMIDE 10 MG: 10 TABLET ORAL at 11:05

## 2018-05-03 RX ADMIN — LEVOTHYROXINE SODIUM 75 MCG: 75 TABLET ORAL at 05:05

## 2018-05-03 RX ADMIN — METOCLOPRAMIDE 10 MG: 10 TABLET ORAL at 09:05

## 2018-05-03 RX ADMIN — LEVETIRACETAM 500 MG: 500 TABLET, FILM COATED ORAL at 08:05

## 2018-05-03 NOTE — PROCEDURES
Radiology Post-Procedure Note    Pre Op Diagnosis: Ascites  Post Op Diagnosis: Same    Procedure: Paracentesis    Procedure performed by: Jhonny Valdes MD    Written Informed Consent Obtained: Yes  Specimen Removed: YES 25cc  Estimated Blood Loss: Minimal    Findings:   Successful diagnostic paracentesis.  Insufficient fluid for a therapeutic paracentesis.    Patient tolerated procedure well.    Rajesh Torres MD  Radiology

## 2018-05-03 NOTE — SUBJECTIVE & OBJECTIVE
Scheduled Meds:   bisacodyl  10 mg Oral Daily    cycloSPORINE modified (NEORAL)  75 mg Oral BID    dronabinol  2.5 mg Oral QHS    ergocalciferol  50,000 Units Oral Q7 Days    heparin (porcine)  5,000 Units Subcutaneous Q8H    levETIRAcetam  500 mg Oral BID    levothyroxine  75 mcg Oral Before breakfast    metoclopramide HCl  10 mg Oral QID (AC & HS)    omeprazole  40 mg Oral Before breakfast    pantoprazole  40 mg Oral Daily    ursodiol  300 mg Oral BID     Continuous Infusions:  PRN Meds:sodium chloride, acetaminophen, heparin (porcine), omnipaque, ondansetron, oxyCODONE, prochlorperazine, promethazine (PHENERGAN) IVPB, sodium chloride 0.9%    Review of Systems   Constitutional: Positive for fatigue. Negative for activity change, appetite change and fever.   HENT: Negative for congestion and facial swelling.    Eyes: Negative for pain, discharge and visual disturbance.   Respiratory: Negative for cough, chest tightness, shortness of breath and wheezing.    Cardiovascular: Negative for chest pain, palpitations and leg swelling.   Gastrointestinal: Positive for abdominal distention and abdominal pain. Negative for constipation, nausea and vomiting.   Endocrine: Negative.    Genitourinary: Positive for decreased urine volume and flank pain.   Musculoskeletal: Negative for back pain.   Allergic/Immunologic: Positive for immunocompromised state.   Neurological: Positive for weakness. Negative for dizziness and light-headedness.   Hematological: Negative.    Psychiatric/Behavioral: Negative for agitation. The patient is not nervous/anxious.      Objective:     Vital Signs (Most Recent):  Temp: 98.6 °F (37 °C) (05/03/18 1148)  Pulse: 88 (05/03/18 1148)  Resp: 18 (05/03/18 1148)  BP: (!) 158/96 (05/03/18 1148)  SpO2: 98 % (05/03/18 1148) Vital Signs (24h Range):  Temp:  [98.5 °F (36.9 °C)-99.5 °F (37.5 °C)] 98.6 °F (37 °C)  Pulse:  [] 88  Resp:  [16-20] 18  SpO2:  [95 %-99 %] 98 %  BP: (132-172)/()  158/96     Weight: 63.4 kg (139 lb 12.4 oz)  Body mass index is 22.56 kg/m².    Intake/Output - Last 3 Shifts       05/01 0700 - 05/02 0659 05/02 0700 - 05/03 0659 05/03 0700 - 05/04 0659    P.O. 290 180     I.V. (mL/kg)  50 (0.8)     Other 1300      IV Piggyback  100     Total Intake(mL/kg) 1590 (25.1) 330 (5.2)     Urine (mL/kg/hr) 225 (0.1)      Emesis/NG output 0 (0)      Other 1300 (0.9)      Stool 0 (0)      Blood 0 (0)      Total Output 1525        Net +65 +330             Urine Occurrence  1 x     Stool Occurrence  1 x 0 x    Emesis Occurrence 2 x 1 x           Physical Exam   Constitutional: He is oriented to person, place, and time. He appears well-developed.   Cachectic, hand and temporal muscle wasting   HENT:   Head: Normocephalic and atraumatic.   Eyes: Pupils are equal, round, and reactive to light. Scleral icterus is present.   Neck: Normal range of motion.   Cardiovascular: Normal rate, regular rhythm, normal heart sounds and intact distal pulses.    Pulmonary/Chest: Effort normal and breath sounds normal.   Abdominal: Soft. Bowel sounds are normal. He exhibits no distension. There is tenderness.   Musculoskeletal: Normal range of motion. He exhibits no edema.   Neurological: He is alert and oriented to person, place, and time.   Skin: Skin is warm and dry.   Psychiatric: He has a normal mood and affect. His behavior is normal. Judgment and thought content normal.   Nursing note and vitals reviewed.      Laboratory:  Immunosuppressants         Stop Route Frequency     cycloSPORINE modified (NEORAL) capsule 75 mg      -- Oral 2 times daily        CBC:   Recent Labs  Lab 05/03/18 0416   WBC 5.56   RBC 2.79*   HGB 8.7*   HCT 26.0*   PLT 36*   MCV 93   MCH 31.2*   MCHC 33.5     CMP:   Recent Labs  Lab 05/03/18 0416   GLU 61*   CALCIUM 9.0   ALBUMIN 2.2*   PROT 6.2   *   K 4.2   CO2 22*      BUN 29*   CREATININE 5.0*   ALKPHOS 133   ALT <5*   AST 23   BILITOT 2.7*     Labs within the past  24 hours have been reviewed.    Diagnostic Results:  I have personally reviewed all pertinent imaging studies.

## 2018-05-03 NOTE — PLAN OF CARE
Problem: Patient Care Overview  Goal: Plan of Care Review  Pt AAOx4, bed low locked, call light within reach, wearing nonskid socks. Pt instructed to use call light for assistance, pt verbalizes understanding. Pt c/o ABD pain, PRN pain meds x 1, no further complaints. Pt NPO for procedure. Pt remains free from injury/harm at this time. Tmax 99.5. See flowsheet for full assessment/VS.

## 2018-05-03 NOTE — H&P
Inpatient Radiology Pre-procedure Note    History of Present Illness:  Jhonny Diana is a 27 y.o. male who presents for paracentesis.  Admission H&P reviewed.  Past Medical History:   Diagnosis Date    Alcoholic hepatitis with ascites     Alcoholic hepatitis with ascites     ESRD on dialysis     History of hematemesis 9/28/2017    Hypertension     Renal disorder     Seizures      Past Surgical History:   Procedure Laterality Date    APPENDECTOMY      ERCP      ESOPHAGOGASTRODUODENOSCOPY      LIVER TRANSPLANT         Review of Systems:   As documented in primary team H&P    Home Meds:   Prior to Admission medications    Medication Sig Start Date End Date Taking? Authorizing Provider   amoxicillin-clavulanate 875-125mg (AUGMENTIN) 875-125 mg per tablet Take 1 tablet by mouth every 12 (twelve) hours. 4/23/18  Yes Georgina Rogers MD   bisacodyl (DULCOLAX) 5 mg EC tablet Take 2 tablets (10 mg total) by mouth once daily. 4/10/18  Yes Solis Chapa MD   cycloSPORINE modified, NEORAL, (NEORAL) 100 MG capsule Take 1 capsule (100 mg total) by mouth 2 (two) times daily. 4/9/18 4/9/19 Yes Solis Chapa MD   cycloSPORINE modified, NEORAL, 25 MG capsule Take 3 capsules (75 mg total) by mouth 2 (two) times daily. 4/10/18  Yes Michelle Herrera MD   dronabinol (MARINOL) 2.5 MG capsule Take 1 capsule (2.5 mg total) by mouth every evening. 4/10/18  Yes Nathanael Medina MD   ergocalciferol (ERGOCALCIFEROL) 50,000 unit Cap Take 1 capsule (50,000 Units total) by mouth every 7 days. 4/9/18 6/26/18 Yes Solis Chapa MD   levETIRAcetam (KEPPRA) 500 MG Tab Take 1 tablet (500 mg total) by mouth 2 (two) times daily. 11/7/17 11/7/18 Yes Nathanael Medina MD   levothyroxine (SYNTHROID) 75 MCG tablet Take 1 tablet (75 mcg total) by mouth before breakfast. 12/21/17  Yes Nathanael Medina MD   metoclopramide HCl (REGLAN) 10 MG tablet Take 1 tablet (10 mg total) by mouth 4 (four) times daily before meals and nightly. 4/9/18  Yes Michelle Herrera MD    ondansetron (ZOFRAN-ODT) 8 MG TbDL Take 1 tablet (8 mg total) by mouth every 8 (eight) hours as needed (nausea). 1/10/18  Yes Quiana Carter MD   oxyCODONE (ROXICODONE) 10 mg Tab immediate release tablet Take 0.5-1 tablets (5-10 mg total) by mouth every 4 (four) hours as needed for Pain. 4/20/18  Yes Georgina Rogers MD   sevelamer carbonate (RENVELA) 800 mg Tab Take 1 tablet (800 mg total) by mouth 3 (three) times daily with meals. 4/9/18 4/9/19 Yes Solis Chapa MD   ursodiol (ACTIGALL) 300 mg capsule Take 1 capsule (300 mg total) by mouth 2 (two) times daily. 4/9/18 4/9/19 Yes Michelle Herrera MD   ciprofloxacin HCl (CIPRO) 500 MG tablet Take 1 tablet (500 mg total) by mouth every 12 (twelve) hours. 4/30/18 5/5/18  Lara Nugent MD   omeprazole (PRILOSEC) 40 MG capsule Take 1 capsule (40 mg total) by mouth before breakfast. 4/30/18 4/30/19  Georgina Rogers MD     Scheduled Meds:    bisacodyl  10 mg Oral Daily    cycloSPORINE modified (NEORAL)  75 mg Oral BID    dronabinol  2.5 mg Oral QHS    ergocalciferol  50,000 Units Oral Q7 Days    heparin (porcine)  5,000 Units Subcutaneous Q8H    levETIRAcetam  500 mg Oral BID    levothyroxine  75 mcg Oral Before breakfast    metoclopramide HCl  10 mg Oral QID (AC & HS)    omeprazole  40 mg Oral Before breakfast    pantoprazole  40 mg Oral Daily    ursodiol  300 mg Oral BID     Continuous Infusions:   PRN Meds:sodium chloride, acetaminophen, heparin (porcine), omnipaque, ondansetron, oxyCODONE, prochlorperazine, promethazine (PHENERGAN) IVPB, sodium chloride 0.9%  Anticoagulants/Antiplatelets: Heparin    Allergies:   Review of patient's allergies indicates:   Allergen Reactions    Bactrim [sulfamethoxazole-trimethoprim] Other (See Comments)     Mookie Trell Syndrome     Sedation Hx: have not been any systemic reactions    Labs:  No results for input(s): INR in the last 168 hours.    Invalid input(s):  PT,  PTT    Recent Labs  Lab 05/03/18  0416   WBC 5.56    HGB 8.7*   HCT 26.0*   MCV 93   PLT 36*      Recent Labs  Lab 05/03/18  0416   GLU 61*   *   K 4.2      CO2 22*   BUN 29*   CREATININE 5.0*   CALCIUM 9.0   MG 1.8   ALT <5*   AST 23   ALBUMIN 2.2*   BILITOT 2.7*         Vitals:  Temp: 98.4 °F (36.9 °C) (05/03/18 1504)  Pulse: 79 (05/03/18 1504)  Resp: 18 (05/03/18 1504)  BP: (!) 141/101 (05/03/18 1504)  SpO2: 97 % (05/03/18 1504)     Physical Exam:  ASA: 3  Mallampati: 2    General: no acute distress  Mental Status: alert and oriented to person, place and time  HEENT: normocephalic, atraumatic  Chest: unlabored breathing  Abdomen: nondistended  Extremity: moves all extremities    Plan: paracetnesis  Sedation Plan: versed 1mg.    Rajesh Torres MD  Radiology

## 2018-05-03 NOTE — PROGRESS NOTES
[facility]  Liver Transplant  Progress Note    Patient Name: Jhonny Diana  MRN: [id number]  Admission Date: 2018  Hospital Length of Stay: 3 days  Code Status: Full Code  Primary Care Provider: Primary Doctor No  Post-Operative Day: 196    ORGAN:   LIVER  Disease Etiology: Acute Alcoholic Hepatitis  Donor Type:    - Brain Death  CDC High Risk:   No  Donor CMV Status:   Donor CMV Status: Positive  Donor HBcAB:   Negative  Donor HCV Status:   Negative  Whole or Partial: Whole Liver  Biliary Anastomosis: End to End  Arterial Anatomy: Standard  Subjective:     History of Present Illness:  26 y/o male with pmh of esrd 2/2 ETOH cirrhosis.  S/p oltx 10/19/2017 (Liver) with steroid induction.  Post op course with multiple complications to include biliary stricture, FUO, esrd, and chronic abdominal pain.  Underwent outpatient ERCP for rise in bilirubin and abdominal pain.  Post ERCP with persistent pain.  Will admit for pain treatment and pancreatitis workup.    Hospital Course:  Interval history: No acute events overnight. Abdominal pain improving. CT a/p reviewed with surgeons. Constipation and ascites noted. Pt had BM with brown bomb enema. Paracentesis scheduled today to r/o peritonitis. Again explained the need to follow up with chronic pain management clinic for treatment. Monitor.       Scheduled Meds:   bisacodyl  10 mg Oral Daily    cycloSPORINE modified (NEORAL)  75 mg Oral BID    dronabinol  2.5 mg Oral QHS    ergocalciferol  50,000 Units Oral Q7 Days    heparin (porcine)  5,000 Units Subcutaneous Q8H    levETIRAcetam  500 mg Oral BID    levothyroxine  75 mcg Oral Before breakfast    metoclopramide HCl  10 mg Oral QID (AC & HS)    omeprazole  40 mg Oral Before breakfast    pantoprazole  40 mg Oral Daily    ursodiol  300 mg Oral BID     Continuous Infusions:  PRN Meds:sodium chloride, acetaminophen, heparin (porcine), omnipaque, ondansetron, oxyCODONE, prochlorperazine,  promethazine (PHENERGAN) IVPB, sodium chloride 0.9%    Review of Systems   Constitutional: Positive for fatigue. Negative for activity change, appetite change and fever.   HENT: Negative for congestion and facial swelling.    Eyes: Negative for pain, discharge and visual disturbance.   Respiratory: Negative for cough, chest tightness, shortness of breath and wheezing.    Cardiovascular: Negative for chest pain, palpitations and leg swelling.   Gastrointestinal: Positive for abdominal distention and abdominal pain. Negative for constipation, nausea and vomiting.   Endocrine: Negative.    Genitourinary: Positive for decreased urine volume and flank pain.   Musculoskeletal: Negative for back pain.   Allergic/Immunologic: Positive for immunocompromised state.   Neurological: Positive for weakness. Negative for dizziness and light-headedness.   Hematological: Negative.    Psychiatric/Behavioral: Negative for agitation. The patient is not nervous/anxious.      Objective:     Vital Signs (Most Recent):  Temp: 98.6 °F (37 °C) (05/03/18 1148)  Pulse: 88 (05/03/18 1148)  Resp: 18 (05/03/18 1148)  BP: (!) 158/96 (05/03/18 1148)  SpO2: 98 % (05/03/18 1148) Vital Signs (24h Range):  Temp:  [98.5 °F (36.9 °C)-99.5 °F (37.5 °C)] 98.6 °F (37 °C)  Pulse:  [] 88  Resp:  [16-20] 18  SpO2:  [95 %-99 %] 98 %  BP: (132-172)/() 158/96     Weight: 63.4 kg (139 lb 12.4 oz)  Body mass index is 22.56 kg/m².    Intake/Output - Last 3 Shifts       05/01 0700 - 05/02 0659 05/02 0700 - 05/03 0659 05/03 0700 - 05/04 0659    P.O. 290 180     I.V. (mL/kg)  50 (0.8)     Other 1300      IV Piggyback  100     Total Intake(mL/kg) 1590 (25.1) 330 (5.2)     Urine (mL/kg/hr) 225 (0.1)      Emesis/NG output 0 (0)      Other 1300 (0.9)      Stool 0 (0)      Blood 0 (0)      Total Output 1525        Net +65 +330             Urine Occurrence  1 x     Stool Occurrence  1 x 0 x    Emesis Occurrence 2 x 1 x           Physical Exam   Constitutional: He  is oriented to person, place, and time. He appears well-developed.   Cachectic, hand and temporal muscle wasting   HENT:   Head: Normocephalic and atraumatic.   Eyes: Pupils are equal, round, and reactive to light. Scleral icterus is present.   Neck: Normal range of motion.   Cardiovascular: Normal rate, regular rhythm, normal heart sounds and intact distal pulses.    Pulmonary/Chest: Effort normal and breath sounds normal.   Abdominal: Soft. Bowel sounds are normal. He exhibits no distension. There is tenderness.   Musculoskeletal: Normal range of motion. He exhibits no edema.   Neurological: He is alert and oriented to person, place, and time.   Skin: Skin is warm and dry.   Psychiatric: He has a normal mood and affect. His behavior is normal. Judgment and thought content normal.   Nursing note and vitals reviewed.      Laboratory:  Immunosuppressants         Stop Route Frequency     cycloSPORINE modified (NEORAL) capsule 75 mg      -- Oral 2 times daily        CBC:   Recent Labs  Lab 05/03/18  0416   WBC 5.56   RBC 2.79*   HGB 8.7*   HCT 26.0*   PLT 36*   MCV 93   MCH 31.2*   MCHC 33.5     CMP:   Recent Labs  Lab 05/03/18  0416   GLU 61*   CALCIUM 9.0   ALBUMIN 2.2*   PROT 6.2   *   K 4.2   CO2 22*      BUN 29*   CREATININE 5.0*   ALKPHOS 133   ALT <5*   AST 23   BILITOT 2.7*     Labs within the past 24 hours have been reviewed.    Diagnostic Results:  I have personally reviewed all pertinent imaging studies.    Assessment/Plan:     * Biliary stricture of transplanted liver    - underwent outpatient ercp on 4/30.   - A previously placed stent had partially migrated into the biliary tree. The distal portion of the stent was 1 mm above the biliary os. This was adjusted by pulling the stent partially out of the bile duct.   - The biliary tree was swept and sludge was found.  - more abdominal pain, work up for pancreatitis negative  - repeat ERCP in 2 months to remove the stent.   - CT scan 5/1 with stent  intact.        S/P liver transplant    - LFTs stable.   - bilirubin elevated, underwent ercp see above.  - Stent adjusted. CT a/p shows stent in place.         Long-term use of immunosuppressant medication    - continue cyclosporine, monitor cya levels daily and adjust for therapeutic dose.        Prophylactic immunotherapy    - see long term immunosuppression.        Anemia of chronic disease    - H&H stable today.   - Transfused 2 units 5/1 with appropriate response.           Acute renal failure with tubular necrosis    - consult nephrology to manage dialysis.  - HD 5/1 with bld transfusion.  - Tolerated well.         Nausea and vomiting    - Compazine and phenergan ordered for symptom control.          At risk for opportunistic infections              Severe protein-calorie malnutrition    - continue marinol for appetite stimulant.  - encourage po intake.          Ascites    - Moderate to large volume ascites seen on CT scan.   - Paracentesis today to r/o peritonitis.               VTE Risk Mitigation         Ordered     heparin (porcine) injection 1,000 Units  As needed (PRN)      05/01/18 0943     heparin (porcine) injection 5,000 Units  Every 8 hours      04/30/18 1227     IP VTE HIGH RISK PATIENT  Once      04/30/18 1227          The patients clinical status was discussed at multidisplinary rounds, involving transplant surgery, transplant medicine, pharmacy, nursing, nutrition, and social work    Discharge Planning: not a candidate for dc at this time.  Monitor kidney function --> may need outpt HD  Monitor HH needs vs rehab for deconditioned status      Mandy Crespo NP  Liver Transplant  [facility]

## 2018-05-03 NOTE — ASSESSMENT & PLAN NOTE
- underwent outpatient ercp on 4/30.   - A previously placed stent had partially migrated into the biliary tree. The distal portion of the stent was 1 mm above the biliary os. This was adjusted by pulling the stent partially out of the bile duct.   - The biliary tree was swept and sludge was found.  - more abdominal pain, work up for pancreatitis negative  - repeat ERCP in 2 months to remove the stent.   - CT scan 5/1 with stent intact.

## 2018-05-03 NOTE — ASSESSMENT & PLAN NOTE
- LFTs stable.   - bilirubin elevated, underwent ercp see above.  - Stent adjusted. CT a/p shows stent in place.

## 2018-05-03 NOTE — NURSING
Only labs drawn. U/S showed note enough fluids for therapeutic Para today. Called report to Elisha SHEEHAN on TSU. Sent labs.

## 2018-05-04 ENCOUNTER — NURSE TRIAGE (OUTPATIENT)
Dept: ADMINISTRATIVE | Facility: CLINIC | Age: 28
End: 2018-05-04

## 2018-05-04 VITALS
RESPIRATION RATE: 18 BRPM | HEIGHT: 66 IN | DIASTOLIC BLOOD PRESSURE: 88 MMHG | OXYGEN SATURATION: 98 % | BODY MASS INDEX: 22.46 KG/M2 | WEIGHT: 139.75 LBS | SYSTOLIC BLOOD PRESSURE: 160 MMHG | TEMPERATURE: 99 F | HEART RATE: 80 BPM

## 2018-05-04 LAB
ABO + RH BLD: NORMAL
ALBUMIN SERPL BCP-MCNC: 2.2 G/DL
ALP SERPL-CCNC: 123 U/L
ALT SERPL W/O P-5'-P-CCNC: <5 U/L
ANION GAP SERPL CALC-SCNC: 9 MMOL/L
AST SERPL-CCNC: 20 U/L
BASOPHILS # BLD AUTO: 0.02 K/UL
BASOPHILS NFR BLD: 0.6 %
BILIRUB SERPL-MCNC: 2.3 MG/DL
BLD GP AB SCN CELLS X3 SERPL QL: NORMAL
BLD PROD TYP BPU: NORMAL
BLOOD UNIT EXPIRATION DATE: NORMAL
BLOOD UNIT TYPE CODE: 5100
BLOOD UNIT TYPE: NORMAL
BUN SERPL-MCNC: 35 MG/DL
CALCIUM SERPL-MCNC: 8.9 MG/DL
CHLORIDE SERPL-SCNC: 101 MMOL/L
CO2 SERPL-SCNC: 23 MMOL/L
CODING SYSTEM: NORMAL
CREAT SERPL-MCNC: 5.6 MG/DL
CYCLOSPORINE BLD LC/MS/MS-MCNC: 180 NG/ML
DIFFERENTIAL METHOD: ABNORMAL
DISPENSE STATUS: NORMAL
EOSINOPHIL # BLD AUTO: 0.1 K/UL
EOSINOPHIL NFR BLD: 2.8 %
ERYTHROCYTE [DISTWIDTH] IN BLOOD BY AUTOMATED COUNT: 14.7 %
EST. GFR  (AFRICAN AMERICAN): 14.7 ML/MIN/1.73 M^2
EST. GFR  (NON AFRICAN AMERICAN): 12.7 ML/MIN/1.73 M^2
GLUCOSE SERPL-MCNC: 71 MG/DL
HCT VFR BLD AUTO: 21.6 %
HGB BLD-MCNC: 7.2 G/DL
IMM GRANULOCYTES # BLD AUTO: 0.03 K/UL
IMM GRANULOCYTES NFR BLD AUTO: 0.8 %
LYMPHOCYTES # BLD AUTO: 1 K/UL
LYMPHOCYTES NFR BLD: 27.2 %
MAGNESIUM SERPL-MCNC: 1.5 MG/DL
MCH RBC QN AUTO: 31.4 PG
MCHC RBC AUTO-ENTMCNC: 33.3 G/DL
MCV RBC AUTO: 94 FL
MONOCYTES # BLD AUTO: 0.4 K/UL
MONOCYTES NFR BLD: 9.8 %
NEUTROPHILS # BLD AUTO: 2.1 K/UL
NEUTROPHILS NFR BLD: 58.8 %
NRBC BLD-RTO: 0 /100 WBC
NUM UNITS TRANS PACKED RBC: NORMAL
PHOSPHATE SERPL-MCNC: 3.8 MG/DL
PLATELET # BLD AUTO: 22 K/UL
PMV BLD AUTO: 13.3 FL
POTASSIUM SERPL-SCNC: 4.1 MMOL/L
PROT SERPL-MCNC: 5.7 G/DL
RBC # BLD AUTO: 2.29 M/UL
SODIUM SERPL-SCNC: 133 MMOL/L
WBC # BLD AUTO: 3.57 K/UL

## 2018-05-04 PROCEDURE — 36430 TRANSFUSION BLD/BLD COMPNT: CPT | Mod: NTX

## 2018-05-04 PROCEDURE — 63600175 PHARM REV CODE 636 W HCPCS: Mod: NTX | Performed by: NURSE PRACTITIONER

## 2018-05-04 PROCEDURE — 85025 COMPLETE CBC W/AUTO DIFF WBC: CPT | Mod: NTX

## 2018-05-04 PROCEDURE — 86920 COMPATIBILITY TEST SPIN: CPT | Mod: NTX

## 2018-05-04 PROCEDURE — 90935 HEMODIALYSIS ONE EVALUATION: CPT | Mod: NTX,,, | Performed by: NURSE PRACTITIONER

## 2018-05-04 PROCEDURE — 80158 DRUG ASSAY CYCLOSPORINE: CPT | Mod: NTX

## 2018-05-04 PROCEDURE — 86850 RBC ANTIBODY SCREEN: CPT | Mod: NTX

## 2018-05-04 PROCEDURE — P9016 RBC LEUKOCYTES REDUCED: HCPCS | Mod: NTX

## 2018-05-04 PROCEDURE — 84100 ASSAY OF PHOSPHORUS: CPT | Mod: NTX

## 2018-05-04 PROCEDURE — 80053 COMPREHEN METABOLIC PANEL: CPT | Mod: NTX

## 2018-05-04 PROCEDURE — 25000003 PHARM REV CODE 250: Mod: NTX | Performed by: NURSE PRACTITIONER

## 2018-05-04 PROCEDURE — 36415 COLL VENOUS BLD VENIPUNCTURE: CPT | Mod: NTX

## 2018-05-04 PROCEDURE — 90935 HEMODIALYSIS ONE EVALUATION: CPT | Mod: NTX

## 2018-05-04 PROCEDURE — 83735 ASSAY OF MAGNESIUM: CPT | Mod: NTX

## 2018-05-04 RX ORDER — SEVELAMER CARBONATE 800 MG/1
800 TABLET, FILM COATED ORAL
Qty: 90 TABLET | Refills: 11 | Status: ON HOLD | OUTPATIENT
Start: 2018-05-04 | End: 2018-06-04

## 2018-05-04 RX ORDER — HYDROCODONE BITARTRATE AND ACETAMINOPHEN 500; 5 MG/1; MG/1
TABLET ORAL
Status: DISCONTINUED | OUTPATIENT
Start: 2018-05-04 | End: 2018-05-04 | Stop reason: HOSPADM

## 2018-05-04 RX ORDER — OXYCODONE HYDROCHLORIDE 10 MG/1
5-10 TABLET ORAL EVERY 4 HOURS PRN
Qty: 40 TABLET | Refills: 0 | Status: ON HOLD | OUTPATIENT
Start: 2018-05-04 | End: 2018-05-23

## 2018-05-04 RX ORDER — SODIUM CHLORIDE 9 MG/ML
INJECTION, SOLUTION INTRAVENOUS ONCE
Status: COMPLETED | OUTPATIENT
Start: 2018-05-04 | End: 2018-05-04

## 2018-05-04 RX ORDER — MAGNESIUM SULFATE HEPTAHYDRATE 40 MG/ML
2 INJECTION, SOLUTION INTRAVENOUS ONCE
Status: COMPLETED | OUTPATIENT
Start: 2018-05-04 | End: 2018-05-04

## 2018-05-04 RX ADMIN — URSODIOL 300 MG: 300 CAPSULE ORAL at 06:05

## 2018-05-04 RX ADMIN — CYCLOSPORINE 75 MG: 25 CAPSULE, LIQUID FILLED ORAL at 06:05

## 2018-05-04 RX ADMIN — METOCLOPRAMIDE 10 MG: 10 TABLET ORAL at 11:05

## 2018-05-04 RX ADMIN — BISACODYL 10 MG: 5 TABLET, COATED ORAL at 10:05

## 2018-05-04 RX ADMIN — SODIUM CHLORIDE: 0.9 INJECTION, SOLUTION INTRAVENOUS at 07:05

## 2018-05-04 RX ADMIN — LEVOTHYROXINE SODIUM 75 MCG: 75 TABLET ORAL at 06:05

## 2018-05-04 RX ADMIN — MAGNESIUM SULFATE IN WATER 2 G: 40 INJECTION, SOLUTION INTRAVENOUS at 10:05

## 2018-05-04 RX ADMIN — LEVETIRACETAM 500 MG: 500 TABLET, FILM COATED ORAL at 10:05

## 2018-05-04 RX ADMIN — OXYCODONE HYDROCHLORIDE 10 MG: 5 TABLET ORAL at 10:05

## 2018-05-04 RX ADMIN — OMEPRAZOLE 40 MG: 40 CAPSULE, DELAYED RELEASE ORAL at 10:05

## 2018-05-04 NOTE — NURSING
[facility]. AVS reviewed with patient and mother. All medications, follow up appointments, and when to call the MD reviewed with patient. All questions answered. PIV removed. VSS. Pt wheeled off floor with mother. No acute distress noted.

## 2018-05-04 NOTE — PROGRESS NOTES
Dialysis complete.  Blood returned.  Right chest wall  CVC flushed * 2 with       Locked with cap and tape.  No s/s infection at cvc site.   Pt tolerated hemodialysis without diff.  Pt ran  3  Hrs on hemodialysis machine.   Took off  1000 Ml net volume.      Used F-160 dialyzer.    Pt given 1 unit pof prbc

## 2018-05-04 NOTE — PROGRESS NOTES
Discharge Note:    EZEKIEL met with pt and pts mom to discuss dc planning.   Pt presents as A&Ox4, engaging, good eye contact and asking and answering questions appropriately.  Pt feels much better today and pt in agreement with dc plans for today.   Pt will dc back to local transplant lodging at [address] under the care of his mother Sanjuanita Diana, phone [phone].   Patient and pts mom asking questions about when he can be released from staying locally to return to mom's home in [place], [place].  EZEKIEL discussed that she will bring this to the attention of Dr. Rogers and nurse olinda Hall.    Pt will resume outpatient HD at [facility] ([phone]) on MF at 3:30pm.   Pt lvm for [place] the  that pt being dc and will return on Monday.   SW discussed with patient the need for addiction behavior services at [facility], discussing this was an agreement with pt pre-transplant and team would like for patient to follow-up with them at this point. Patient in agreement, but is asking if this can be done at [facility] in [place], when he discharges to his mother's home.  SW will speak to Dr. Rogers and team, if pt not released to return to [place] soon, would want him to start program here.  EZEKIEL provided pt with contact information for [facility].  EZEKIEL did [facility] pts hepatologist and post transplant coordinator regarding conversation with patient as well.        Patient was to access outpt Pt after hospital dc three weeks ago, but had to cancel the first appointment due to appt conflicts.  Orders being placed today for outpatient PT, they will contact pt once ins auth is obtained.  Pt has appt desk phone [phone], EZEKIEL provided in writing today.   Overall patient reports coping appropriately when pain manageable, but he does want to go home to mom's house, to be a more regular routine.   Pt and pts mom asking many medical questions that EZEKIEL directed them to have down in writing  for follow-up with Dr. Rogers and/or the nephrologist at the HD unit.       Pt and pts caregiver aware how to contact transplant SW outpatient.  SW remains available for all transplant resources, education and psychosocial support.

## 2018-05-04 NOTE — PLAN OF CARE
Problem: Patient Care Overview  Goal: Plan of Care Review  Pt aaox4 vswnl and c/o abd pain. Bed in low position and callbell within reach. Mother at bedside. Pt ambulates with walker and standby assist. Standard precautions and reverse isolation maintained. Pt planned for am dialysis. bp running high since admit. 130-170/. Pt refusing heparin sq and walking halls.

## 2018-05-04 NOTE — PROGRESS NOTES
Pt arrived via stretcher.  Placed on cardiac monitor and b/p check every 15 minutes.right chest wall  Dialysis access prep with prevantic swab.  maintance   Hemodialysis started per orders.

## 2018-05-04 NOTE — DISCHARGE SUMMARY
[facility]  Liver Transplant  Discharge Summary      Patient Name: Jhonny Diana  MRN: [id number]  Admission Date: 2018  Hospital Length of Stay: 4 days  Discharge Date and Time:  2018 1:42 PM  Attending Physician: Jw Valdivia MD   Discharging Provider: Mandy Crespo NP  Primary Care Provider: Primary Doctor No  Post-Operative Day: 197     ORGAN:   LIVER  Disease Etiology: Acute Alcoholic Hepatitis  Donor Type:    - Brain Death  CDC High Risk:   No  Donor CMV Status:   Donor CMV Status: Positive  Donor HBcAB:   Negative  Donor HCV Status:   Negative  Whole or Partial: Whole Liver  Biliary Anastomosis: End to End  Arterial Anatomy: Standard    HPI:  Mr. Diana is a 26 y/o male with PMH of ESRD 2/2 ETOH cirrhosis.  S/p OLTX 10/19/2017 (Liver) with steroid induction. Post op course with multiple complications to include biliary stricture, FUO, ESRD, and chronic abdominal pain. Pt underwent an outpatient ERCP  for rise in bilirubin and abdominal pain. A previously placed stent had partially migrated into the biliary tree. The distal portion of the stent was 1 mm above the biliary os. This was adjusted by pulling the stent partially out of the bile duct. The biliary tree was swept and sludge was found. Post ERCP with persistent pain. Workup for pancreatitis negative. CT a/p  showed constipation and ascites, stent intact. (+) BM reported with enema. Paracentesis 5/3 negative for infection, wbc 17 segs 1%. Abdominal pain improved with pain medications and BM. He will need a repeat ERCP in 2 months to remove the stent.     Of note, with drop in HCT. Pt received 2 units PRBCs on . Occult blood for stool negative. 1 unit PRBC given with HD  for H&H of 7.2/21.6. No overt signs/symptoms of bleeding.     Pt tolerating HD today with 1 L removed. Will resume outpatient HD twice weekly.    Mr. Diana is stable for discharge today. He has no complaints. Pt will dc with narcotic  prescription for 1 week. Pt instructed to take meds as prescribed and follow up with chronic pain management clinic for treatment. He will f/u with lab work Monday 5/7 and hepatology clinic visit next Thursday 5/10. Pt will need outpatient GI appt for evaluation and treatment of narcotic bowel. He will resume outpatient PT. Pt/caregiver verbalized understanding to discharge instructions.       Procedure(s) (LRB):  ULTRASOUND-ENDOSCOPIC-UPPER/liver Bx (N/A)  ERCP (N/A)         Final Active Diagnoses:    Diagnosis Date Noted POA    PRINCIPAL PROBLEM:  Biliary stricture of transplanted liver [T86.49, K83.1] 12/07/2017 Yes    S/P liver transplant [Z94.4] 10/20/2017 Not Applicable     Chronic    Long-term use of immunosuppressant medication [Z79.899] 10/20/2017 Not Applicable     Chronic    Prophylactic immunotherapy [Z29.8] 10/20/2017 Not Applicable     Chronic    Anemia of chronic disease [D63.8] 11/18/2017 Yes    Acute renal failure with tubular necrosis [N17.0] 09/28/2017 Yes    Nausea and vomiting [R11.2] 01/11/2018 Yes    Severe protein-calorie malnutrition [E43] 01/11/2018 Yes    Ascites [R18.8] 01/11/2018 Yes    ESRD (end stage renal disease) [N18.6] 01/13/2018 Yes      Problems Resolved During this Admission:    Diagnosis Date Noted Date Resolved POA    Elevated liver function tests [R79.89] 04/30/2018 04/30/2018 Yes       Consults         Status Ordering Provider     Inpatient consult to Nephrology  Once     Provider:  (Not yet assigned)    Completed VANDANA SAMUEL          Pending Diagnostic Studies:     Procedure Component Value Units Date/Time    FL ERCP Biliary And Pancreatic [[id number]] Resulted:  04/30/18 0911    Order Status:  Sent Lab Status:  In process Updated:  04/30/18 1023    IR Paracentesis with Imaging [[id number]] Resulted:  05/03/18 1447    Order Status:  Sent Lab Status:  In process Updated:  05/03/18 1530    Specimen:  Peritoneal Fluid     US Endoscopic Ultrasound [[id number]]  Resulted:  04/30/18 0911    Order Status:  Sent Lab Status:  In process Updated:  04/30/18 1022        Significant Diagnostic Studies: Labs:   CMP     Recent Labs  Lab 05/03/18  0416 05/04/18  0507   * 133*   K 4.2 4.1    101   CO2 22* 23   GLU 61* 71   BUN 29* 35*   CREATININE 5.0* 5.6*   CALCIUM 9.0 8.9   PROT 6.2 5.7*   ALBUMIN 2.2* 2.2*   BILITOT 2.7* 2.3*   ALKPHOS 133 123   AST 23 20   ALT <5* <5*   ANIONGAP 12 9   ESTGFRAFRICA 16.8* 14.7*   EGFRNONAA 14.6* 12.7*    and CBC     Recent Labs  Lab 05/03/18 0416 05/04/18  0507   WBC 5.56 3.57*   HGB 8.7* 7.2*   HCT 26.0* 21.6*   PLT 36* 22*       The patients clinical status was discussed at multidisplinary rounds, involving transplant surgery, transplant medicine, pharmacy, nursing, nutrition, and social work    Discharged Condition: good    Disposition: Home or Self Care    Follow Up: as above    Patient Instructions:     Referral to OutPatient  Physical therapy   Referral Priority: Routine Referral Type: Physical Medicine   Referral Reason: Specialty Services Required    Requested Specialty: Physical Therapy    Number of Visits Requested: 1      Diet general     Diet Adult Regular     Activity as tolerated     Call MD for:  temperature >100.4     Call MD for:  persistent nausea and vomiting or diarrhea     Call MD for:  severe uncontrolled pain     Call MD for:  redness, tenderness, or signs of infection (pain, swelling, redness, odor or green/yellow discharge around incision site)     Call MD for:  difficulty breathing or increased cough     Call MD for:  severe persistent headache     Call MD for:  worsening rash     Call MD for:  persistent dizziness, light-headedness, or visual disturbances     Call MD for:  increased confusion or weakness     Call MD for:   Order Comments: Any unusual problems or concerns.       Medications:  Reconciled Home Medications:      Medication List      CHANGE how you take these medications    cycloSPORINE modified  (NEORAL) 100 MG capsule  Commonly known as:  NEORAL  Take 1 capsule (100 mg total) by mouth 2 (two) times daily.  What changed:  Another medication with the same name was removed. Continue taking this medication, and follow the directions you see here.        CONTINUE taking these medications    bisacodyl 5 mg EC tablet  Commonly known as:  DULCOLAX  Take 2 tablets (10 mg total) by mouth once daily.     dronabinol 2.5 MG capsule  Commonly known as:  MARINOL  Take 1 capsule (2.5 mg total) by mouth every evening.     ergocalciferol 50,000 unit Cap  Commonly known as:  ERGOCALCIFEROL  Take 1 capsule (50,000 Units total) by mouth every 7 days.     levETIRAcetam 500 MG Tab  Commonly known as:  KEPPRA  Take 1 tablet (500 mg total) by mouth 2 (two) times daily.     levothyroxine 75 MCG tablet  Commonly known as:  SYNTHROID  Take 1 tablet (75 mcg total) by mouth before breakfast.     metoclopramide HCl 10 MG tablet  Commonly known as:  REGLAN  Take 1 tablet (10 mg total) by mouth 4 (four) times daily before meals and nightly.     omeprazole 40 MG capsule  Commonly known as:  PRILOSEC  Take 1 capsule (40 mg total) by mouth before breakfast.     ondansetron 8 MG Tbdl  Commonly known as:  ZOFRAN-ODT  Take 1 tablet (8 mg total) by mouth every 8 (eight) hours as needed (nausea).     oxyCODONE 10 mg Tab immediate release tablet  Commonly known as:  ROXICODONE  Take 0.5-1 tablets (5-10 mg total) by mouth every 4 (four) hours as needed for Pain.     sevelamer carbonate 800 mg Tab  Commonly known as:  RENVELA  Take 1 tablet (800 mg total) by mouth 3 (three) times daily with meals.     ursodiol 300 mg capsule  Commonly known as:  ACTIGALL  Take 1 capsule (300 mg total) by mouth 2 (two) times daily.        STOP taking these medications    amoxicillin-clavulanate 875-125mg 875-125 mg per tablet  Commonly known as:  AUGMENTIN          Time spent caring for patient (Greater than 1/2 spent in direct face-to-face contact): > 30  minutes    Mandy Crespo NP  Liver Transplant  [facility]

## 2018-05-04 NOTE — PROGRESS NOTES
[facility] H[facility] NOTE     Patient currently on hemodialysis for removal of uremic toxins and volume.     Patient seen and evaluated on hemodialysis, tolerating treatment, see HD flowsheet for vitals and assessments.      Ultrafiltration goal is 1L     Labs have been reviewed and the dialysate bath has been adjusted.     Assessment/Plan:  Seen on dialysis this morning, tolerating well without complaints.  Continue M/F schedule while in-patient.    LANE Beard, [facility]  Nephrology  Pager:  [phone]         I have reviewed and concur with the NP's history, physical, assessment, and plan. I have personally interviewed and examined the patient at bedside.

## 2018-05-04 NOTE — PROGRESS NOTES
Discharge Medication Note:    Hospital Course:  Mr Diana is s/p liver transplant from 10/19/17 admitted for pain post ERCP and pancreatitis work up.  Patient treated for constipation and received paracentesis to r/o peritonitis.  At time of discharge, patient receiving HD and feeling improved - only med changes include CsA dose adjustment to 100mg BID.  Patient also given Rx for oxycodone 10mg #40 with instructions to follow up with pain clinic for treatment of chronic pain.    Met with Jhonny Diana at discharge to review discharge medications and to update the blue medication card.       Lebron Jhonny   Home Medication Instructions NISHI:[id number]    Printed on:05/04/18 0084   Medication Information                      bisacodyl (DULCOLAX) 5 mg EC tablet  Take 2 tablets (10 mg total) by mouth once daily.             cycloSPORINE modified, NEORAL, (NEORAL) 100 MG capsule  Take 1 capsule (100 mg total) by mouth 2 (two) times daily.             dronabinol (MARINOL) 2.5 MG capsule  Take 1 capsule (2.5 mg total) by mouth every evening.             ergocalciferol (ERGOCALCIFEROL) 50,000 unit Cap  Take 1 capsule (50,000 Units total) by mouth every 7 days.             levETIRAcetam (KEPPRA) 500 MG Tab  Take 1 tablet (500 mg total) by mouth 2 (two) times daily.             levothyroxine (SYNTHROID) 75 MCG tablet  Take 1 tablet (75 mcg total) by mouth before breakfast.             metoclopramide HCl (REGLAN) 10 MG tablet  Take 1 tablet (10 mg total) by mouth 4 (four) times daily before meals and nightly.             omeprazole (PRILOSEC) 40 MG capsule  Take 1 capsule (40 mg total) by mouth before breakfast.             ondansetron (ZOFRAN-ODT) 8 MG TbDL  Take 1 tablet (8 mg total) by mouth every 8 (eight) hours as needed (nausea).             oxyCODONE (ROXICODONE) 10 mg Tab immediate release tablet  Take 0.5-1 tablets (5-10 mg total) by mouth every 4 (four) hours as needed for Pain.           Sevelamer carbonate (RENVELA)  800mg tablet  1 tablets by mouth 3 (three) times a day with meals           ursodiol (ACTIGALL) 300 mg capsule  Take 1 capsule (300 mg total) by mouth 2 (two) times daily.                 Pt was provided with prescriptions for the following meds:  E-rx: Oxycodone #40  Printed rx: none  Phone-in or faxed rx: none to none pharmacy per pt request.    The following medications have been placed on HOLD and should be restarted in the outpatient setting (when appropriate): none    Jhonny Diana verbalized understanding and had the opportunity to ask questions.

## 2018-05-05 LAB
BACTERIA BLD CULT: NORMAL
BACTERIA BLD CULT: NORMAL

## 2018-05-05 NOTE — TELEPHONE ENCOUNTER
"Kidney/liver list , liver tx 10/2017   BPA 4,16   CC: pt sleeping. Pt had fever     Reason for Disposition   Transplant patient (e.g., kidney, liver, lung, heart)    Answer Assessment - Initial Assessment Questions  1. TEMPERATURE: "What is the most recent temperature?"  "How was it measured?"      100.4 oral this evening   2. ONSET: "When did the fever start?"      This evening   3. SYMPTOMS: "Do you have any other symptoms besides the fever?"  (e.g., colds, headache, sore throat, earache, cough, rash, diarrhea, vomiting, abdominal pain)    abd pain entire time - unchanged. No new sx   4. CAUSE: If there are no symptoms, ask: "What do you think is causing the fever?"      Just got out today from hospital at 4pm   5. CONTACTS: "Does anyone else in the family have an infection?"     No   6. TREATMENT: "What have you done so far to treat this fever?" (e.g., medications)     No   7. IMMUNOCOMPROMISE: "Do you have of the following: diabetes, HIV positive, splenectomy, cancer chemotherapy, chronic steroid treatment, transplant patient, etc."     transplant pt     9. TRAVEL: "Have you traveled out of the country in the last month?" (e.g., travel history, exposures)     N/a    Protocols used:  FEVER-A-  rec ED due to fever. Spoke with dr kyler johnson for tylenol. Reassess in am. rec ED in am if fever persists in am. Parent notified. Call back with questions.      "

## 2018-05-07 ENCOUNTER — TELEPHONE (OUTPATIENT)
Dept: TRANSPLANT | Facility: CLINIC | Age: 28
End: 2018-05-07

## 2018-05-07 DIAGNOSIS — T40.601A NARCOTIC BOWEL SYNDROME: ICD-10-CM

## 2018-05-07 DIAGNOSIS — Z94.4 STATUS POST LIVER TRANSPLANT: Primary | ICD-10-CM

## 2018-05-07 DIAGNOSIS — K63.89 NARCOTIC BOWEL SYNDROME: ICD-10-CM

## 2018-05-07 LAB
BACTERIA FLD AEROBE CULT: NO GROWTH
GRAM STN SPEC: NORMAL
GRAM STN SPEC: NORMAL

## 2018-05-07 NOTE — TELEPHONE ENCOUNTER
----- Message from Torsten Guillen LCSW sent at 5/4/2018  1:00 PM CDT -----  Regarding: RE: Patient who needs ABU  Thanks Radha  We'll start the review of benefits and will reach out to him regarding start date, accommodations, etc.   ----- Message -----  From: Radha Malagon  Sent: 5/4/2018   9:23 AM  To: Isaebl Gilliam RN, Blank Umana MD, #  Subject: Patient who needs ABU                            Good Morning Vasquez,    Mr. Jhonny Diana is a post liver transplant recipient from 10/19/2017 with mulitple medical complications post transplant and multiple extensive hospital stays.   He was one of our patients who agreed to ABU post liver transplant as he was to sick pre transplant.    He has been on outpatient HD three days a week.   Now he is down to outpatient HD 2 days a week.   He now has chronic pain issues post transplant.   His mom has spoken with ABU staff in the past, maybe Cassy?    I am providing him with ABU contact information again today to call about enrolling, would you all be able to work with him around his dialysis schedule which is MF in later afternoon?    At the very least I would like to see him in addiction counseling.    Please advise.  Thanks,  Radha

## 2018-05-07 NOTE — TELEPHONE ENCOUNTER
----- Message from Mandy Crespo NP sent at 5/4/2018  2:36 PM CDT -----  Mr. Diana is a 28 y/o male with PMH of ESRD 2/2 ETOH cirrhosis.  S/p OLTX 10/19/2017 (Liver) with steroid induction. Post op course with multiple complications to include biliary stricture, FUO, ESRD, and chronic abdominal pain. Pt underwent an outpatient ERCP 4/30 for rise in bilirubin and abdominal pain. A previously placed stent had partially migrated into the biliary tree. The distal portion of the stent was 1 mm above the biliary os. This was adjusted by pulling the stent partially out of the bile duct. The biliary tree was swept and sludge was found. Post ERCP with persistent pain. Workup for pancreatitis negative. CT a/p 5/1 showed constipation and ascites, stent intact. (+) BM reported with enema. Paracentesis 5/3 negative for infection, wbc 17 segs 1%. Abdominal pain improved with pain medications and BM. He will need a repeat ERCP in 2 months to remove the stent.     Of note, with drop in HCT. Pt received 2 units PRBCs on 5/1. Occult blood for stool negative. 1 unit PRBC given with HD 5/4 for H&H of 7.2/21.6. No overt signs/symptoms of bleeding.     Pt tolerating HD today with 1 L removed. Will resume outpatient HD twice weekly.    Mr. Diana is stable for discharge today. He has no complaints. Pt will dc with narcotic prescription for 1 week. Pt instructed to take meds as prescribed and follow up with chronic pain management clinic for treatment. He will f/u with lab work Monday 5/7 and hepatology clinic visit next Thursday 5/10. Pt will need outpatient GI appt for evaluation and treatment of narcotic bowel. He will resume outpatient PT. Pt/caregiver verbalized understanding to discharge instructions.

## 2018-05-08 ENCOUNTER — HOSPITAL ENCOUNTER (INPATIENT)
Facility: HOSPITAL | Age: 28
LOS: 14 days | Discharge: HOME | DRG: 441 | End: 2018-05-23
Attending: EMERGENCY MEDICINE | Admitting: SURGERY
Payer: COMMERCIAL

## 2018-05-08 ENCOUNTER — TELEPHONE (OUTPATIENT)
Dept: TRANSPLANT | Facility: CLINIC | Age: 28
End: 2018-05-08

## 2018-05-08 DIAGNOSIS — Z29.89 PROPHYLACTIC IMMUNOTHERAPY: Chronic | ICD-10-CM

## 2018-05-08 DIAGNOSIS — M89.9 CHRONIC KIDNEY DISEASE-MINERAL AND BONE DISORDER: ICD-10-CM

## 2018-05-08 DIAGNOSIS — K83.1 BILIARY STRICTURE OF TRANSPLANTED LIVER: ICD-10-CM

## 2018-05-08 DIAGNOSIS — N18.9 ANEMIA OF CHRONIC RENAL FAILURE, UNSPECIFIED CKD STAGE: ICD-10-CM

## 2018-05-08 DIAGNOSIS — N18.5 ANEMIA OF CHRONIC RENAL FAILURE, STAGE 5: ICD-10-CM

## 2018-05-08 DIAGNOSIS — R50.9 FEVER: Primary | ICD-10-CM

## 2018-05-08 DIAGNOSIS — D69.6 THROMBOCYTOPENIA: ICD-10-CM

## 2018-05-08 DIAGNOSIS — N18.4 ANEMIA OF CHRONIC RENAL FAILURE, STAGE 4 (SEVERE): ICD-10-CM

## 2018-05-08 DIAGNOSIS — G89.29 CHRONIC ABDOMINAL PAIN: ICD-10-CM

## 2018-05-08 DIAGNOSIS — R79.89 ELEVATED LFTS: ICD-10-CM

## 2018-05-08 DIAGNOSIS — T86.49 BILIARY STRICTURE OF TRANSPLANTED LIVER: ICD-10-CM

## 2018-05-08 DIAGNOSIS — Z79.60 LONG-TERM USE OF IMMUNOSUPPRESSANT MEDICATION: Chronic | ICD-10-CM

## 2018-05-08 DIAGNOSIS — N18.6 ESRD (END STAGE RENAL DISEASE): ICD-10-CM

## 2018-05-08 DIAGNOSIS — R50.81 FEVER IN OTHER DISEASES: ICD-10-CM

## 2018-05-08 DIAGNOSIS — D61.818 OTHER PANCYTOPENIA: ICD-10-CM

## 2018-05-08 DIAGNOSIS — D63.1 ANEMIA OF CHRONIC RENAL FAILURE, STAGE 4 (SEVERE): ICD-10-CM

## 2018-05-08 DIAGNOSIS — E43 SEVERE PROTEIN-CALORIE MALNUTRITION: ICD-10-CM

## 2018-05-08 DIAGNOSIS — Z91.89 AT RISK FOR OPPORTUNISTIC INFECTIONS: ICD-10-CM

## 2018-05-08 DIAGNOSIS — D63.1 ANEMIA OF CHRONIC RENAL FAILURE, STAGE 5: ICD-10-CM

## 2018-05-08 DIAGNOSIS — E83.9 CHRONIC KIDNEY DISEASE-MINERAL AND BONE DISORDER: ICD-10-CM

## 2018-05-08 DIAGNOSIS — R50.9 FEVER, UNSPECIFIED FEVER CAUSE: ICD-10-CM

## 2018-05-08 DIAGNOSIS — E83.42 HYPOMAGNESEMIA: ICD-10-CM

## 2018-05-08 DIAGNOSIS — R17 ELEVATED BILIRUBIN: ICD-10-CM

## 2018-05-08 DIAGNOSIS — N18.9 CHRONIC KIDNEY DISEASE-MINERAL AND BONE DISORDER: ICD-10-CM

## 2018-05-08 DIAGNOSIS — D63.1 ANEMIA OF CHRONIC RENAL FAILURE, UNSPECIFIED CKD STAGE: ICD-10-CM

## 2018-05-08 DIAGNOSIS — Z94.4 S/P LIVER TRANSPLANT: Chronic | ICD-10-CM

## 2018-05-08 DIAGNOSIS — R00.1 BRADYCARDIA: ICD-10-CM

## 2018-05-08 DIAGNOSIS — R10.9 CHRONIC ABDOMINAL PAIN: ICD-10-CM

## 2018-05-08 DIAGNOSIS — K59.01 SLOW TRANSIT CONSTIPATION: ICD-10-CM

## 2018-05-08 DIAGNOSIS — E83.39 HYPERPHOSPHATEMIA: ICD-10-CM

## 2018-05-08 LAB
ALBUMIN SERPL BCP-MCNC: 2.4 G/DL
ALP SERPL-CCNC: 151 U/L
ALT SERPL W/O P-5'-P-CCNC: 6 U/L
ANION GAP SERPL CALC-SCNC: 10 MMOL/L
ANISOCYTOSIS BLD QL SMEAR: SLIGHT
AST SERPL-CCNC: 36 U/L
BASOPHILS # BLD AUTO: 0.01 K/UL
BASOPHILS NFR BLD: 0.3 %
BILIRUB SERPL-MCNC: 3.9 MG/DL
BUN SERPL-MCNC: 22 MG/DL
CALCIUM SERPL-MCNC: 9 MG/DL
CHLORIDE SERPL-SCNC: 99 MMOL/L
CO2 SERPL-SCNC: 27 MMOL/L
CREAT SERPL-MCNC: 3.6 MG/DL
DACRYOCYTES BLD QL SMEAR: ABNORMAL
DIFFERENTIAL METHOD: ABNORMAL
EOSINOPHIL # BLD AUTO: 0.1 K/UL
EOSINOPHIL NFR BLD: 2.1 %
ERYTHROCYTE [DISTWIDTH] IN BLOOD BY AUTOMATED COUNT: 14.7 %
EST. GFR  (AFRICAN AMERICAN): 25.1 ML/MIN/1.73 M^2
EST. GFR  (NON AFRICAN AMERICAN): 21.7 ML/MIN/1.73 M^2
GLUCOSE SERPL-MCNC: 80 MG/DL
HCT VFR BLD AUTO: 22.6 %
HGB BLD-MCNC: 7.5 G/DL
HYPOCHROMIA BLD QL SMEAR: ABNORMAL
IMM GRANULOCYTES # BLD AUTO: 0.04 K/UL
IMM GRANULOCYTES NFR BLD AUTO: 1.2 %
LIPASE SERPL-CCNC: <3 U/L
LYMPHOCYTES # BLD AUTO: 0.8 K/UL
LYMPHOCYTES NFR BLD: 24.9 %
MCH RBC QN AUTO: 31 PG
MCHC RBC AUTO-ENTMCNC: 33.2 G/DL
MCV RBC AUTO: 93 FL
MONOCYTES # BLD AUTO: 0.5 K/UL
MONOCYTES NFR BLD: 13.3 %
NEUTROPHILS # BLD AUTO: 2 K/UL
NEUTROPHILS NFR BLD: 58.2 %
NRBC BLD-RTO: 0 /100 WBC
OVALOCYTES BLD QL SMEAR: ABNORMAL
PLATELET # BLD AUTO: 26 K/UL
PLATELET BLD QL SMEAR: ABNORMAL
PMV BLD AUTO: ABNORMAL FL
POIKILOCYTOSIS BLD QL SMEAR: SLIGHT
POLYCHROMASIA BLD QL SMEAR: ABNORMAL
POTASSIUM SERPL-SCNC: 4.6 MMOL/L
PROT SERPL-MCNC: 6.9 G/DL
RBC # BLD AUTO: 2.42 M/UL
SODIUM SERPL-SCNC: 136 MMOL/L
WBC # BLD AUTO: 3.38 K/UL

## 2018-05-08 PROCEDURE — 99285 EMERGENCY DEPT VISIT HI MDM: CPT | Mod: 25,NTX

## 2018-05-08 PROCEDURE — 80053 COMPREHEN METABOLIC PANEL: CPT | Mod: NTX

## 2018-05-08 PROCEDURE — 83690 ASSAY OF LIPASE: CPT | Mod: NTX

## 2018-05-08 PROCEDURE — 85025 COMPLETE CBC W/AUTO DIFF WBC: CPT | Mod: NTX

## 2018-05-08 PROCEDURE — 87040 BLOOD CULTURE FOR BACTERIA: CPT | Mod: NTX

## 2018-05-08 PROCEDURE — 96360 HYDRATION IV INFUSION INIT: CPT | Mod: NTX

## 2018-05-08 PROCEDURE — 25000003 PHARM REV CODE 250: Mod: NTX | Performed by: PSYCHIATRY & NEUROLOGY

## 2018-05-08 PROCEDURE — 96361 HYDRATE IV INFUSION ADD-ON: CPT | Mod: NTX

## 2018-05-08 PROCEDURE — 99285 EMERGENCY DEPT VISIT HI MDM: CPT | Mod: NTX,,, | Performed by: EMERGENCY MEDICINE

## 2018-05-08 RX ORDER — SODIUM CHLORIDE 9 MG/ML
500 INJECTION, SOLUTION INTRAVENOUS
Status: COMPLETED | OUTPATIENT
Start: 2018-05-08 | End: 2018-05-09

## 2018-05-08 RX ORDER — OXYCODONE HYDROCHLORIDE 5 MG/1
5 TABLET ORAL
Status: COMPLETED | OUTPATIENT
Start: 2018-05-08 | End: 2018-05-08

## 2018-05-08 RX ADMIN — SODIUM CHLORIDE 500 ML: 9 INJECTION, SOLUTION INTRAVENOUS at 11:05

## 2018-05-08 RX ADMIN — OXYCODONE HYDROCHLORIDE 5 MG: 5 TABLET ORAL at 11:05

## 2018-05-08 NOTE — TELEPHONE ENCOUNTER
Pt mother called concerned with pt elevated T bili pt mother also reports pt is feeling week and his b/p has been elevated. Pt has bee also having low grade temp x 2 days .4. Pt has been advised to report to ER if temp continues to rise with tylenol. Message has guille sent to Dr. Rogers pending his review

## 2018-05-08 NOTE — TELEPHONE ENCOUNTER
----- Message from Fco Santos sent at 5/8/2018  8:22 AM CDT -----  Contact: patient mother  Needs Medical Advice    Who Called:patient mother  Symptoms (please be specific):  n/a  How long has patient had these symptoms:  n/a  Pharmacy name and phone # if needed:  n/a  Communication Preference (MyChart response to Pt. (or) Call Back # and timeframe):  Please call [phone]  Additional Information:patient mother would like a call about the patient lab results.

## 2018-05-08 NOTE — TELEPHONE ENCOUNTER
5/3/18: Liver Pathology Conference:    Liver Biopsy: sub-optimal biopsy: mild portal tract inflamation;not diagnostic for rejection.

## 2018-05-09 ENCOUNTER — DOCUMENTATION ONLY (OUTPATIENT)
Dept: PSYCHIATRY | Facility: HOSPITAL | Age: 28
End: 2018-05-09

## 2018-05-09 PROBLEM — N18.9 CHRONIC KIDNEY DISEASE-MINERAL AND BONE DISORDER: Status: ACTIVE | Noted: 2018-05-09

## 2018-05-09 PROBLEM — R79.89 ELEVATED LFTS: Status: RESOLVED | Noted: 2018-04-30 | Resolved: 2018-05-09

## 2018-05-09 PROBLEM — R50.9 FEVER: Status: ACTIVE | Noted: 2018-05-09

## 2018-05-09 PROBLEM — D63.1 ANEMIA IN CHRONIC KIDNEY DISEASE: Status: ACTIVE | Noted: 2017-11-18

## 2018-05-09 PROBLEM — B25.8 OTHER CYTOMEGALOVIRAL DISEASES: Status: RESOLVED | Noted: 2018-02-05 | Resolved: 2018-05-09

## 2018-05-09 PROBLEM — E83.9 CHRONIC KIDNEY DISEASE-MINERAL AND BONE DISORDER: Status: ACTIVE | Noted: 2018-05-09

## 2018-05-09 PROBLEM — M89.9 CHRONIC KIDNEY DISEASE-MINERAL AND BONE DISORDER: Status: ACTIVE | Noted: 2018-05-09

## 2018-05-09 PROBLEM — N18.9 ANEMIA IN CHRONIC KIDNEY DISEASE: Status: ACTIVE | Noted: 2017-11-18

## 2018-05-09 LAB
ABO + RH BLD: NORMAL
ALBUMIN SERPL BCP-MCNC: 2 G/DL
ALP SERPL-CCNC: 125 U/L
ALT SERPL W/O P-5'-P-CCNC: <5 U/L
ANION GAP SERPL CALC-SCNC: 9 MMOL/L
ANISOCYTOSIS BLD QL SMEAR: SLIGHT
AST SERPL-CCNC: 21 U/L
BACTERIA #/AREA URNS AUTO: ABNORMAL /HPF
BASOPHILS # BLD AUTO: 0.01 K/UL
BASOPHILS # BLD AUTO: 0.01 K/UL
BASOPHILS NFR BLD: 0.3 %
BASOPHILS NFR BLD: 0.4 %
BILIRUB SERPL-MCNC: 2.9 MG/DL
BILIRUB UR QL STRIP: NEGATIVE
BLD GP AB SCN CELLS X3 SERPL QL: NORMAL
BUN SERPL-MCNC: 25 MG/DL
CALCIUM SERPL-MCNC: 8.3 MG/DL
CHLORIDE SERPL-SCNC: 102 MMOL/L
CLARITY UR REFRACT.AUTO: CLEAR
CO2 SERPL-SCNC: 27 MMOL/L
COLOR UR AUTO: YELLOW
CREAT SERPL-MCNC: 3.7 MG/DL
CYCLOSPORINE BLD LC/MS/MS-MCNC: 167 NG/ML
DAT IGG-SP REAG RBC-IMP: NORMAL
DIFFERENTIAL METHOD: ABNORMAL
DIFFERENTIAL METHOD: ABNORMAL
EOSINOPHIL # BLD AUTO: 0.1 K/UL
EOSINOPHIL # BLD AUTO: 0.1 K/UL
EOSINOPHIL NFR BLD: 1.9 %
EOSINOPHIL NFR BLD: 2 %
ERYTHROCYTE [DISTWIDTH] IN BLOOD BY AUTOMATED COUNT: 14.8 %
ERYTHROCYTE [DISTWIDTH] IN BLOOD BY AUTOMATED COUNT: 14.8 %
EST. GFR  (AFRICAN AMERICAN): 24.2 ML/MIN/1.73 M^2
EST. GFR  (NON AFRICAN AMERICAN): 21 ML/MIN/1.73 M^2
FOLATE SERPL-MCNC: 5.4 NG/ML
GLUCOSE SERPL-MCNC: 69 MG/DL
GLUCOSE UR QL STRIP: NEGATIVE
HAPTOGLOB SERPL-MCNC: <10 MG/DL
HCT VFR BLD AUTO: 17.2 %
HCT VFR BLD AUTO: 21.9 %
HGB BLD-MCNC: 5.8 G/DL
HGB BLD-MCNC: 7.1 G/DL
HGB UR QL STRIP: ABNORMAL
HYALINE CASTS UR QL AUTO: 3 /LPF
HYPOCHROMIA BLD QL SMEAR: ABNORMAL
IMM GRANULOCYTES # BLD AUTO: 0.04 K/UL
IMM GRANULOCYTES # BLD AUTO: 0.05 K/UL
IMM GRANULOCYTES NFR BLD AUTO: 1.4 %
IMM GRANULOCYTES NFR BLD AUTO: 1.9 %
INR PPP: 1
KETONES UR QL STRIP: NEGATIVE
LDH SERPL L TO P-CCNC: 732 U/L
LEUKOCYTE ESTERASE UR QL STRIP: NEGATIVE
LYMPHOCYTES # BLD AUTO: 0.9 K/UL
LYMPHOCYTES # BLD AUTO: 0.9 K/UL
LYMPHOCYTES NFR BLD: 29.5 %
LYMPHOCYTES NFR BLD: 32.3 %
MAGNESIUM SERPL-MCNC: 1.4 MG/DL
MCH RBC QN AUTO: 30.5 PG
MCH RBC QN AUTO: 31.7 PG
MCHC RBC AUTO-ENTMCNC: 32.4 G/DL
MCHC RBC AUTO-ENTMCNC: 33.7 G/DL
MCV RBC AUTO: 94 FL
MCV RBC AUTO: 94 FL
MICROSCOPIC COMMENT: ABNORMAL
MONOCYTES # BLD AUTO: 0.3 K/UL
MONOCYTES # BLD AUTO: 0.4 K/UL
MONOCYTES NFR BLD: 14.4 %
MONOCYTES NFR BLD: 9.5 %
NEUTROPHILS # BLD AUTO: 1.3 K/UL
NEUTROPHILS # BLD AUTO: 1.7 K/UL
NEUTROPHILS NFR BLD: 49.1 %
NEUTROPHILS NFR BLD: 57.3 %
NITRITE UR QL STRIP: NEGATIVE
NRBC BLD-RTO: 0 /100 WBC
NRBC BLD-RTO: 0 /100 WBC
OVALOCYTES BLD QL SMEAR: ABNORMAL
PH UR STRIP: >8 [PH] (ref 5–8)
PLATELET # BLD AUTO: 18 K/UL
PLATELET # BLD AUTO: 22 K/UL
PLATELET BLD QL SMEAR: ABNORMAL
PMV BLD AUTO: ABNORMAL FL
PMV BLD AUTO: ABNORMAL FL
POIKILOCYTOSIS BLD QL SMEAR: SLIGHT
POLYCHROMASIA BLD QL SMEAR: ABNORMAL
POTASSIUM SERPL-SCNC: 4 MMOL/L
PROT SERPL-MCNC: 5.4 G/DL
PROT UR QL STRIP: ABNORMAL
PROTHROMBIN TIME: 10.7 SEC
RBC # BLD AUTO: 1.83 M/UL
RBC # BLD AUTO: 2.33 M/UL
RBC #/AREA URNS AUTO: 0 /HPF (ref 0–4)
RETICS/RBC NFR AUTO: 3.3 %
SODIUM SERPL-SCNC: 138 MMOL/L
SP GR UR STRIP: 1.01 (ref 1–1.03)
SPHEROCYTES BLD QL SMEAR: ABNORMAL
TSH SERPL DL<=0.005 MIU/L-ACNC: 3.09 UIU/ML
URN SPEC COLLECT METH UR: ABNORMAL
UROBILINOGEN UR STRIP-ACNC: 4 EU/DL
WBC # BLD AUTO: 2.63 K/UL
WBC # BLD AUTO: 2.95 K/UL
WBC #/AREA URNS AUTO: 0 /HPF (ref 0–5)

## 2018-05-09 PROCEDURE — 25000003 PHARM REV CODE 250: Mod: NTX | Performed by: STUDENT IN AN ORGANIZED HEALTH CARE EDUCATION/TRAINING PROGRAM

## 2018-05-09 PROCEDURE — 85045 AUTOMATED RETICULOCYTE COUNT: CPT | Mod: NTX

## 2018-05-09 PROCEDURE — 83010 ASSAY OF HAPTOGLOBIN QUANT: CPT | Mod: NTX

## 2018-05-09 PROCEDURE — 82746 ASSAY OF FOLIC ACID SERUM: CPT | Mod: NTX

## 2018-05-09 PROCEDURE — 85025 COMPLETE CBC W/AUTO DIFF WBC: CPT | Mod: 91,NTX

## 2018-05-09 PROCEDURE — 20600001 HC STEP DOWN PRIVATE ROOM: Mod: NTX

## 2018-05-09 PROCEDURE — 87086 URINE CULTURE/COLONY COUNT: CPT | Mod: NTX

## 2018-05-09 PROCEDURE — 99223 1ST HOSP IP/OBS HIGH 75: CPT | Mod: NTX,,, | Performed by: NURSE PRACTITIONER

## 2018-05-09 PROCEDURE — 80053 COMPREHEN METABOLIC PANEL: CPT | Mod: NTX

## 2018-05-09 PROCEDURE — 86880 COOMBS TEST DIRECT: CPT | Mod: NTX

## 2018-05-09 PROCEDURE — 25000003 PHARM REV CODE 250: Mod: NTX | Performed by: NURSE PRACTITIONER

## 2018-05-09 PROCEDURE — 80158 DRUG ASSAY CYCLOSPORINE: CPT | Mod: NTX

## 2018-05-09 PROCEDURE — 99222 1ST HOSP IP/OBS MODERATE 55: CPT | Mod: NTX,,, | Performed by: INTERNAL MEDICINE

## 2018-05-09 PROCEDURE — 99254 IP/OBS CNSLTJ NEW/EST MOD 60: CPT | Mod: NTX,,, | Performed by: INTERNAL MEDICINE

## 2018-05-09 PROCEDURE — 63600175 PHARM REV CODE 636 W HCPCS: Mod: NTX | Performed by: STUDENT IN AN ORGANIZED HEALTH CARE EDUCATION/TRAINING PROGRAM

## 2018-05-09 PROCEDURE — 86901 BLOOD TYPING SEROLOGIC RH(D): CPT | Mod: NTX

## 2018-05-09 PROCEDURE — 63600175 PHARM REV CODE 636 W HCPCS: Mod: NTX | Performed by: NURSE PRACTITIONER

## 2018-05-09 PROCEDURE — 83615 LACTATE (LD) (LDH) ENZYME: CPT | Mod: NTX

## 2018-05-09 PROCEDURE — 99233 SBSQ HOSP IP/OBS HIGH 50: CPT | Mod: SA,NTX,, | Performed by: NURSE PRACTITIONER

## 2018-05-09 PROCEDURE — 85610 PROTHROMBIN TIME: CPT | Mod: NTX

## 2018-05-09 PROCEDURE — 86920 COMPATIBILITY TEST SPIN: CPT | Mod: NTX

## 2018-05-09 PROCEDURE — 85397 CLOTTING FUNCT ACTIVITY: CPT | Mod: NTX

## 2018-05-09 PROCEDURE — 81001 URINALYSIS AUTO W/SCOPE: CPT | Mod: NTX

## 2018-05-09 PROCEDURE — S0030 INJECTION, METRONIDAZOLE: HCPCS | Mod: NTX | Performed by: STUDENT IN AN ORGANIZED HEALTH CARE EDUCATION/TRAINING PROGRAM

## 2018-05-09 PROCEDURE — 36415 COLL VENOUS BLD VENIPUNCTURE: CPT | Mod: NTX

## 2018-05-09 PROCEDURE — 83735 ASSAY OF MAGNESIUM: CPT | Mod: NTX

## 2018-05-09 PROCEDURE — 84443 ASSAY THYROID STIM HORMONE: CPT | Mod: NTX

## 2018-05-09 RX ORDER — ONDANSETRON 8 MG/1
8 TABLET, ORALLY DISINTEGRATING ORAL EVERY 8 HOURS PRN
Status: DISCONTINUED | OUTPATIENT
Start: 2018-05-09 | End: 2018-05-10

## 2018-05-09 RX ORDER — LEVETIRACETAM 500 MG/1
500 TABLET ORAL 2 TIMES DAILY
Status: DISCONTINUED | OUTPATIENT
Start: 2018-05-09 | End: 2018-05-23 | Stop reason: HOSPADM

## 2018-05-09 RX ORDER — SODIUM CHLORIDE 0.9 % (FLUSH) 0.9 %
3 SYRINGE (ML) INJECTION
Status: DISCONTINUED | OUTPATIENT
Start: 2018-05-09 | End: 2018-05-23 | Stop reason: HOSPADM

## 2018-05-09 RX ORDER — METOCLOPRAMIDE 5 MG/1
10 TABLET ORAL
Status: DISCONTINUED | OUTPATIENT
Start: 2018-05-09 | End: 2018-05-23 | Stop reason: HOSPADM

## 2018-05-09 RX ORDER — DRONABINOL 2.5 MG/1
2.5 CAPSULE ORAL NIGHTLY
Status: DISCONTINUED | OUTPATIENT
Start: 2018-05-09 | End: 2018-05-23 | Stop reason: HOSPADM

## 2018-05-09 RX ORDER — RAMELTEON 8 MG/1
8 TABLET ORAL NIGHTLY PRN
Status: DISCONTINUED | OUTPATIENT
Start: 2018-05-09 | End: 2018-05-23 | Stop reason: HOSPADM

## 2018-05-09 RX ORDER — URSODIOL 300 MG/1
300 CAPSULE ORAL 2 TIMES DAILY
Status: DISCONTINUED | OUTPATIENT
Start: 2018-05-09 | End: 2018-05-23 | Stop reason: HOSPADM

## 2018-05-09 RX ORDER — SODIUM CHLORIDE 9 MG/ML
INJECTION, SOLUTION INTRAVENOUS CONTINUOUS
Status: DISCONTINUED | OUTPATIENT
Start: 2018-05-09 | End: 2018-05-09

## 2018-05-09 RX ORDER — HYDROCODONE BITARTRATE AND ACETAMINOPHEN 500; 5 MG/1; MG/1
TABLET ORAL
Status: DISCONTINUED | OUTPATIENT
Start: 2018-05-09 | End: 2018-05-13

## 2018-05-09 RX ORDER — PROMETHAZINE HYDROCHLORIDE 12.5 MG/1
12.5 SUPPOSITORY RECTAL EVERY 6 HOURS PRN
Status: DISCONTINUED | OUTPATIENT
Start: 2018-05-09 | End: 2018-05-12

## 2018-05-09 RX ORDER — OXYCODONE HYDROCHLORIDE 5 MG/1
5 TABLET ORAL EVERY 4 HOURS PRN
Status: DISCONTINUED | OUTPATIENT
Start: 2018-05-09 | End: 2018-05-09

## 2018-05-09 RX ORDER — PANTOPRAZOLE SODIUM 40 MG/1
40 TABLET, DELAYED RELEASE ORAL DAILY
Status: DISCONTINUED | OUTPATIENT
Start: 2018-05-09 | End: 2018-05-16

## 2018-05-09 RX ORDER — SEVELAMER CARBONATE 800 MG/1
800 TABLET, FILM COATED ORAL
Status: DISCONTINUED | OUTPATIENT
Start: 2018-05-09 | End: 2018-05-23 | Stop reason: HOSPADM

## 2018-05-09 RX ORDER — BISACODYL 5 MG
10 TABLET, DELAYED RELEASE (ENTERIC COATED) ORAL DAILY
Status: DISCONTINUED | OUTPATIENT
Start: 2018-05-09 | End: 2018-05-23 | Stop reason: HOSPADM

## 2018-05-09 RX ORDER — MAGNESIUM SULFATE HEPTAHYDRATE 40 MG/ML
2 INJECTION, SOLUTION INTRAVENOUS ONCE
Status: COMPLETED | OUTPATIENT
Start: 2018-05-09 | End: 2018-05-09

## 2018-05-09 RX ORDER — OXYCODONE HYDROCHLORIDE 5 MG/1
10 TABLET ORAL EVERY 4 HOURS PRN
Status: DISCONTINUED | OUTPATIENT
Start: 2018-05-09 | End: 2018-05-23 | Stop reason: HOSPADM

## 2018-05-09 RX ORDER — HYDROCODONE BITARTRATE AND ACETAMINOPHEN 5; 325 MG/1; MG/1
1 TABLET ORAL EVERY 4 HOURS PRN
Status: DISCONTINUED | OUTPATIENT
Start: 2018-05-09 | End: 2018-05-09

## 2018-05-09 RX ORDER — METRONIDAZOLE 500 MG/100ML
500 INJECTION, SOLUTION INTRAVENOUS
Status: DISCONTINUED | OUTPATIENT
Start: 2018-05-09 | End: 2018-05-10

## 2018-05-09 RX ADMIN — MAGNESIUM SULFATE IN WATER 2 G: 40 INJECTION, SOLUTION INTRAVENOUS at 12:05

## 2018-05-09 RX ADMIN — OXYCODONE HYDROCHLORIDE 5 MG: 5 TABLET ORAL at 10:05

## 2018-05-09 RX ADMIN — URSODIOL 300 MG: 300 CAPSULE ORAL at 10:05

## 2018-05-09 RX ADMIN — METRONIDAZOLE 500 MG: 500 INJECTION, SOLUTION INTRAVENOUS at 10:05

## 2018-05-09 RX ADMIN — METOCLOPRAMIDE HYDROCHLORIDE 10 MG: 5 TABLET ORAL at 10:05

## 2018-05-09 RX ADMIN — METRONIDAZOLE 500 MG: 500 INJECTION, SOLUTION INTRAVENOUS at 03:05

## 2018-05-09 RX ADMIN — METOCLOPRAMIDE HYDROCHLORIDE 10 MG: 5 TABLET ORAL at 05:05

## 2018-05-09 RX ADMIN — OXYCODONE HYDROCHLORIDE 10 MG: 5 TABLET ORAL at 06:05

## 2018-05-09 RX ADMIN — BISACODYL 10 MG: 5 TABLET, COATED ORAL at 09:05

## 2018-05-09 RX ADMIN — OXYCODONE HYDROCHLORIDE 5 MG: 5 TABLET ORAL at 02:05

## 2018-05-09 RX ADMIN — SODIUM CHLORIDE: 0.9 INJECTION, SOLUTION INTRAVENOUS at 02:05

## 2018-05-09 RX ADMIN — OXYCODONE HYDROCHLORIDE 10 MG: 5 TABLET ORAL at 10:05

## 2018-05-09 RX ADMIN — CYCLOSPORINE 100 MG: 100 CAPSULE, LIQUID FILLED ORAL at 10:05

## 2018-05-09 RX ADMIN — SEVELAMER CARBONATE 800 MG: 800 TABLET, FILM COATED ORAL at 05:05

## 2018-05-09 RX ADMIN — LEVETIRACETAM 500 MG: 500 TABLET ORAL at 09:05

## 2018-05-09 RX ADMIN — LEVETIRACETAM 500 MG: 500 TABLET ORAL at 10:05

## 2018-05-09 RX ADMIN — CYCLOSPORINE 100 MG: 100 CAPSULE, LIQUID FILLED ORAL at 09:05

## 2018-05-09 RX ADMIN — LEVOTHYROXINE SODIUM 75 MCG: 25 TABLET ORAL at 05:05

## 2018-05-09 RX ADMIN — PANTOPRAZOLE SODIUM 40 MG: 40 TABLET, DELAYED RELEASE ORAL at 09:05

## 2018-05-09 RX ADMIN — URSODIOL 300 MG: 300 CAPSULE ORAL at 09:05

## 2018-05-09 RX ADMIN — HYDROCODONE BITARTRATE AND ACETAMINOPHEN 1 TABLET: 5; 325 TABLET ORAL at 02:05

## 2018-05-09 RX ADMIN — DRONABINOL 2.5 MG: 2.5 CAPSULE ORAL at 10:05

## 2018-05-09 NOTE — HPI
27 year old male with a history of OLTX (10/19/17) with steroid induction and ESRD on dialysis on who [facility] was consulted to evaluate need for possible ERCP in the setting of fever/elevated bilirubin.    Per patient since leaving the hospital on 5/4/18, patient's mother reports continued fevers as high as 100.8 therefore prompting patient to come to the ED. Apart from the fever he continued to complain of decrease appetite and fatigue. His complains of nausea and abdominal pain are chronic complaints.     This morning patient reported to be feeling OK and slightly sleepy. He has remained afebrile since being admitted with LFT's trending down today.     Prior GI procedures:  ERCP 4/30/18  A previously placed stent had partially migrated into the biliary tree. The distal portion of the stent was 1 mm above the biliary os. This was adjusted by pulling the stent partially out of the bile duct.  The biliary tree was swept and sludge was found.  Repeat ERCP in 2 months to remove stent.    ERCP 3/9/18  Two stents were removed from the biliary tree.  Choledocholithiasis was found. Complete removal was accomplished by balloon extraction.  A mild insignificant biliary stricture was found in the post-transplant anastomosis. The stricture was post-surgical. Unlikely to be the cause of this patient's fever.  One covered metal stent was placed across the stricture given that patient has fever.  Repeat ERCP in 4 months to remove stent.

## 2018-05-09 NOTE — HPI
Mr. 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  S/p OLTX 10/19/2017 (Liver) with steroid induction. Post op course with multiple complications to include biliary stricture, FUO, ESRD, and chronic abdominal pain. Recently discharged on 5/4 after an ERCP. Reports that since discharge he has had fevers of 100.4 to 100.8. These fevers resolve on their own without medication. Also reports decreased appetite and fatigue. Nausea and abdominal pain are baseline and unchanged from prior to discharge. Normal bowel movements and passing gas. Receiving dialysis Monday and Friday. On admission, patient was afebrile and hemodynamically stable. Labs were stable from prior except tbili which is up to 3.9 from 3.2 yesterday.

## 2018-05-09 NOTE — ASSESSMENT & PLAN NOTE
ESRD on iHD M/F  [facility]  Dr. Peterson   3 hours  ?58.7 kg?  RIJ tunneled dialysis catheter  + residual renal function    Plan  No acute need for RRT at this time.  Will continue outpatient schedule while in the hospital.    Follow-up with blood cultures, currently NGTD, ?r/o tunneled dialysis catheter infection?  Strict I/O's    Anemia of ESRD  VIDA with HD.      BMM  Low Phos diet  Phos/PTH/Vit D

## 2018-05-09 NOTE — H&P
[facility]  General Surgery  History & Physical    Patient Name: Jhonny Diana  MRN: [id number]  Admission Date: 5/8/2018  Attending Physician: Luís Green MD   Primary Care Provider: Primary Doctor No    Patient information was obtained from patient and past medical records.     Subjective:     Chief Complaint/Reason for Admission: Fever    History of Present Illness:  Mr. Diana is a 26 y/o male with PMH of ESRD 2/2 ETOH cirrhosis.  S/p OLTX 10/19/2017 (Liver) with steroid induction. Post op course with multiple complications to include biliary stricture, FUO, ESRD, and chronic abdominal pain. Recently discharged on 5/4 after a prolonged hospital stay. Reports that since discharge he has had fevers of 100.4 to 100.8. These fevers resolve on their own without medication. Also reports decreased appetite and fatigue. Nausea and abdominal pain are baseline and unchanged from prior to discharge. Normal bowel movements and passing gas. Receiving dialysis Monday and Friday. On admission, patient was afebrile and hemodynamically stable. Labs were stable from prior except tbili which is up to 3.9 from 3.2 yesterday.     No current facility-administered medications on file prior to encounter.      Current Outpatient Prescriptions on File Prior to Encounter   Medication Sig    bisacodyl (DULCOLAX) 5 mg EC tablet Take 2 tablets (10 mg total) by mouth once daily.    cycloSPORINE modified, NEORAL, (NEORAL) 100 MG capsule Take 1 capsule (100 mg total) by mouth 2 (two) times daily.    dronabinol (MARINOL) 2.5 MG capsule Take 1 capsule (2.5 mg total) by mouth every evening.    ergocalciferol (ERGOCALCIFEROL) 50,000 unit Cap Take 1 capsule (50,000 Units total) by mouth every 7 days.    levETIRAcetam (KEPPRA) 500 MG Tab Take 1 tablet (500 mg total) by mouth 2 (two) times daily.    levothyroxine (SYNTHROID) 75 MCG tablet Take 1 tablet (75 mcg total) by mouth before breakfast.    metoclopramide HCl (REGLAN)  10 MG tablet Take 1 tablet (10 mg total) by mouth 4 (four) times daily before meals and nightly.    omeprazole (PRILOSEC) 40 MG capsule Take 1 capsule (40 mg total) by mouth before breakfast.    ondansetron (ZOFRAN-ODT) 8 MG TbDL Take 1 tablet (8 mg total) by mouth every 8 (eight) hours as needed (nausea).    oxyCODONE (ROXICODONE) 10 mg Tab immediate release tablet Take 0.5-1 tablets (5-10 mg total) by mouth every 4 (four) hours as needed for Pain.    sevelamer carbonate (RENVELA) 800 mg Tab Take 1 tablet (800 mg total) by mouth 3 (three) times daily with meals.    ursodiol (ACTIGALL) 300 mg capsule Take 1 capsule (300 mg total) by mouth 2 (two) times daily.       Review of patient's allergies indicates:   Allergen Reactions    Bactrim [sulfamethoxazole-trimethoprim] Other (See Comments)     Mookie Trell Syndrome       Past Medical History:   Diagnosis Date    Alcoholic hepatitis with ascites     Alcoholic hepatitis with ascites     ESRD on dialysis     History of hematemesis 9/28/2017    Hypertension     Renal disorder     Seizures      Past Surgical History:   Procedure Laterality Date    APPENDECTOMY      ERCP      ESOPHAGOGASTRODUODENOSCOPY      LIVER TRANSPLANT       Family History     Problem Relation (Age of Onset)    No Known Problems Mother        Social History Main Topics    Smoking status: Former Smoker     Packs/day: 1.00     Years: 10.00     Types: Cigarettes     Start date: 2/19/2017    Smokeless tobacco: Never Used    Alcohol use No      Comment: a fifth of liquor daily for years, cut back over last 2 months    Drug use: No    Sexual activity: Not on file     Review of Systems   Constitutional: Positive for fever. Negative for chills.   HENT: Negative for congestion and sore throat.    Respiratory: Negative for cough and shortness of breath.    Gastrointestinal: Positive for abdominal pain and nausea. Negative for abdominal distention, constipation, diarrhea and vomiting.    Musculoskeletal: Negative for arthralgias and myalgias.   Neurological: Negative for dizziness and headaches.   Psychiatric/Behavioral: Negative for agitation and confusion.     Objective:     Vital Signs (Most Recent):  Temp: 99 °F (37.2 °C) (05/09/18 0038)  Pulse: 86 (05/09/18 0038)  Resp: 16 (05/09/18 0038)  BP: (!) 152/82 (05/09/18 0038)  SpO2: 100 % (05/09/18 0038) Vital Signs (24h Range):  Temp:  [99 °F (37.2 °C)-99.2 °F (37.3 °C)] 99 °F (37.2 °C)  Pulse:  [86-98] 86  Resp:  [16-20] 16  SpO2:  [97 %-100 %] 100 %  BP: (152-160)/(82-95) 152/82     Weight: 58.5 kg (129 lb)  Body mass index is 20.82 kg/m².    Physical Exam   Constitutional: He is oriented to person, place, and time. No distress.   Cachectic, jaundice   Cardiovascular: Normal rate and regular rhythm.    Pulmonary/Chest: Effort normal. No respiratory distress.   Abdominal: Soft. He exhibits distension. There is tenderness (baseline abdominal tenderness). There is no rebound and no guarding.   Well healed chevron incision   Neurological: He is alert and oriented to person, place, and time.   Skin: Skin is warm and dry.   Psychiatric: He has a normal mood and affect. His behavior is normal.       Significant Labs:  CBC:   Recent Labs  Lab 05/08/18  2259   WBC 3.38*   RBC 2.42*   HGB 7.5*   HCT 22.6*   PLT 26*   MCV 93   MCH 31.0   MCHC 33.2     BMP:   Recent Labs  Lab 05/04/18  0507  05/08/18  2259   GLU 71  < > 80   *  < > 136   K 4.1  < > 4.6     < > 99   CO2 23  < > 27   BUN 35*  < > 22*   CREATININE 5.6*  < > 3.6*   CALCIUM 8.9  < > 9.0   MG 1.5*  --   --    < > = values in this interval not displayed.    Significant Diagnostics:  I have reviewed all pertinent imaging results/findings within the past 24 hours.    Assessment/Plan:     Active Diagnoses:    Diagnosis Date Noted POA    Fever [R50.9] 05/09/2018 Yes      Problems Resolved During this Admission:    Diagnosis Date Noted Date Resolved POA     VTE Risk Mitigation          Ordered     IP VTE HIGH RISK PATIENT  Once      05/09/18 0044        - Admit to Transplant Surgery  - Consult to AES for evaluation and possible revision of biliary stent  - NPO  - IVF  - Pain and nausea control  - Home meds  - IV antibiotics  - DIscussed with fellow on call    Margarita Swain MD  General Surgery  [facility]

## 2018-05-09 NOTE — ED NOTES
Pt reminded that urine specimen is still needed. Pt unable to urinate at this time and will try again later

## 2018-05-09 NOTE — PSYCH
EZEKIEL contacted pt regarding referral to ABU. Pt reported current hospital admit and complications from post transplant. SW reviewed tx program details. Pt presented apathetically and expressed ambivalence to ABU tx. Pt identified expectation to return to [place] and pursue tx through [place]. SW provided insurance coverage information. Pt expressed understanding. Pt reported that he will return call to ABU pending decision to return to [place]. EZEKIEL provided contact info.

## 2018-05-09 NOTE — ASSESSMENT & PLAN NOTE
27 year old male with a history of OLTX (10/19/17) with steroid induction and ESRD on dialysis on who [facility] was consulted to evaluate need for possible ERCP in the setting of fever/elevated bilirubin.    Patient is well known to AES as in the past 2 months he has had ERCP X 2. Currently he remains afebrile, with no leukocytosis, and LFT's trending down. At this point recommend an abdominal xray and holding off on ERCP.    Recommendations:  --Monitor fever curve, WBC, and LFT's  --Antibiotics per primary team  --Abdominal Xray to make sure stent has not migrated  --No indication for ERCP at this time

## 2018-05-09 NOTE — ED PROVIDER NOTES
Encounter Date: 5/8/2018       History     Chief Complaint   Patient presents with    Fever     pt complains of fever. pt is liver transplant pt with kidney refferal. pt is awake alert and oriented x3. pt denies chest pain or sob but complaisn of nausea. 100.5 notd at home       Mr. Diana is a 27 year old male with history of liver transplant (2017), biliary stricture (s/p stent) and ESRD presented to ED for the evaluation of fever. He was recently discharge  (May 4th 2018) for the rise in bilirubin and abdominal pain. Since the discharge he is complaining of fever max 100.8 last Friday. Since Friday he is spiking fever 100.2 to 100.4 couple of times a day and resolve without any medication. This evening temp was 100.4. In ED 99.2. He is complaining of mild sneezing started today. He also reported mild generalized abdominal pain but that all the time and not worsen from baseline. He also reported nausea for last 4-5 days intermittently. Denies vomiting, CP, SOB, HA, diarrhea, constipation, sick contact, rash, seizure or LOC.                          Review of patient's allergies indicates:   Allergen Reactions    Bactrim [sulfamethoxazole-trimethoprim] Other (See Comments)     Mookie Trell Syndrome     Past Medical History:   Diagnosis Date    Alcoholic hepatitis with ascites     Alcoholic hepatitis with ascites     ESRD on dialysis     History of hematemesis 9/28/2017    Hypertension     Renal disorder     Seizures      Past Surgical History:   Procedure Laterality Date    APPENDECTOMY      ERCP      ESOPHAGOGASTRODUODENOSCOPY      LIVER TRANSPLANT       Family History   Problem Relation Age of Onset    No Known Problems Mother      Social History   Substance Use Topics    Smoking status: Former Smoker     Packs/day: 1.00     Years: 10.00     Types: Cigarettes     Start date: 2/19/2017    Smokeless tobacco: Never Used    Alcohol use No      Comment: a fifth of liquor daily for years, cut back  over last 2 months     Review of Systems   Constitutional: Positive for fever.   HENT: Positive for sneezing. Negative for voice change.    Eyes: Negative for photophobia and visual disturbance.   Respiratory: Negative for cough, chest tightness and shortness of breath.    Cardiovascular: Negative for chest pain.   Gastrointestinal: Positive for abdominal pain and nausea. Negative for constipation, diarrhea and vomiting.   Musculoskeletal: Negative for neck pain and neck stiffness.   Neurological: Negative for dizziness, seizures, facial asymmetry and headaches.   Psychiatric/Behavioral: Negative for agitation and behavioral problems.       Physical Exam     Initial Vitals [05/08/18 2205]   BP Pulse Resp Temp SpO2   (!) 160/95 98 20 99.2 °F (37.3 °C) 97 %      MAP       116.67         Physical Exam    Constitutional: He appears well-developed and well-nourished.   HENT:   Head: Normocephalic and atraumatic.   Eyes: Conjunctivae and EOM are normal. Pupils are equal, round, and reactive to light.   Neck: Normal range of motion. Neck supple.   Cardiovascular: Regular rhythm and normal heart sounds.   Pulmonary/Chest: Breath sounds normal.   Abdominal: Soft. Bowel sounds are normal. There is tenderness.   Mild generalized tenderness   Musculoskeletal: Normal range of motion.   Neurological: He is alert and oriented to person, place, and time. He has normal reflexes. No cranial nerve deficit.         ED Course   Procedures  Labs Reviewed   CULTURE, BLOOD   CULTURE, BLOOD   CBC W/ AUTO DIFFERENTIAL   COMPREHENSIVE METABOLIC PANEL   URINALYSIS, REFLEX TO URINE CULTURE                   APC / Resident Notes:   Mr. Diana is a 27 year old male with history of liver transplant (2017), biliary stricture (s/p stent) and ESRD presented to ED for the evaluation of fever.    Differentials:  -- R/O infection considering immunocompromise state   -- Metabolic abnormalities including elevated liver enzyme     Labs:  -- CBC and CMP  reviewed.   -- Lipase WNL     Plan:  -- Nacl 500 cc bolus  -- Oxycodone 5 mg one time in ED   -- CBC, CMP, Lipase, UA, Blood culture   -- Consult liver transplant team   -- Admit to transplant service               ED Course as of May 09 0020   Wed May 09, 2018   0009 Total Bilirubin: (!) 3.9 [ZF]      ED Course User Index  [ZF] Luís Green MD     Clinical Impression:   The primary encounter diagnosis was Fever, unspecified fever cause. A diagnosis of Elevated bilirubin was also pertinent to this visit.    Disposition:   Disposition: Admitted  Condition: Stable                        Monse Peters II, MD  Resident  05/09/18 0059

## 2018-05-09 NOTE — CARE UPDATE
RN Proactive Rounding Note  Time of Visit: 1130    Admit Date: 2018  LOS: 0  Code Status: Full Code   Date of Visit: 2018  : 1990  Age: 27 y.o.  Sex: male  Bed: 8071/8071 A:   MRN: [id number]  Was the patient discharged from an ICU this admission? No   Was the patient discharged from a PACU within last 24 hours? No  Did the patient receive conscious sedation/general anesthesia in last 24 hours? No  Was the patient in the ED within the past 24 hours? Yes  Was the patient started on NIPPV within the past 24 hours? No  Attending Physician: Jw Valdivia MD  Primary Service: [facility] LIVER TRANSPLANT      ASSESSMENT:     Abnormal Vital Signs: H&H  Clinical Issues: low H&H      INTERVENTIONS/ RECOMMENDATIONS:     Asked by charge RN to check on pt. H&H 5.8 and 17.2 this am. Received 2 units of blood and increased to 7.1 and 21.9      FOLLOW-UP/CONTINGENCY:       Call back the Rapid Response Nurse at [phone]  for additional questions or concerns

## 2018-05-09 NOTE — CONSULTS
"[facility]  Nephrology  Consult Note    Patient Name: Jhonny Diana  MRN: [id number]  Admission Date: 5/8/2018  Hospital Length of Stay: 0 days  Attending Provider: Jw Valdivia MD   Primary Care Physician: Primary Doctor No  Principal Problem:<principal problem not specified>    Inpatient consult to Nephrology  Consult performed by: ERASMO MCCLELLAN  Consult ordered by: VANDANA SAMUEL  Reason for consult: ESRD        Subjective:     HPI: Mr. Jhonny Diana is a 28 yo male with PMHx of ETOH cirrhosis s/p OLTx in October of 2017 complicated by numerous hospitalization and complications including biliary stricture and chronic abdominal pain, HTN, and ESRD on HD Monday/Friday who presents to the hospital on 05/08 with chief complaint of fever and abdominal pain.  He reports that his abdominal pain is about the same has normal without significant change.  During dialysis on Monday, his mother states that he had a temp of 99.8 at the begging and end of treatment, which was confirmed by his dialysis unit.  She also reports hypertensive episodes during treatment which the nurse informed her of and appears his blood pressures were around 151/105-153/98 after treatment.  He states he was feeling "cold" during treatment, but denies rigors or chills.  No reports of exudate or signs of infection to tunneled dialysis catheter.  No ultrafiltration was performed on Mondays treatment and patient left unit with weight of 58.7 kg. Blood cultures drawn in the ED remain NGTD.  He reports good UOP without change.  Denies dysuria.   Nephrology was consulted for ESRD management.    He dialyzes at [facility] with Dr. Peterson on a M/F schedule for 3 hours.  EDW still being established, but left his treatment around 58.7 kg on Monday.  No signs of hypervolemia on examination.  He maintains good residual renal function.      Past Medical History:   Diagnosis Date    Alcoholic hepatitis with ascites     Alcoholic hepatitis " with ascites     ESRD on dialysis     History of hematemesis 9/28/2017    Hypertension     Renal disorder     Seizures        Past Surgical History:   Procedure Laterality Date    APPENDECTOMY      ERCP      ESOPHAGOGASTRODUODENOSCOPY      LIVER TRANSPLANT         Review of patient's allergies indicates:   Allergen Reactions    Bactrim [sulfamethoxazole-trimethoprim] Other (See Comments)     Mookie Trell Syndrome     Current Facility-Administered Medications   Medication Frequency    0.9%  NaCl infusion (for blood administration) Q24H PRN    bisacodyl EC tablet 10 mg Daily    cefTRIAXone (ROCEPHIN) 2 g in dextrose 5 % 50 mL IVPB Q24H    cycloSPORINE modified (NEORAL) capsule 100 mg BID    dronabinol capsule 2.5 mg QHS    levETIRAcetam tablet 500 mg BID    levothyroxine tablet 75 mcg Before breakfast    magnesium sulfate 2g in water 50mL IVPB (premix) Once    metoclopramide HCl tablet 10 mg QID (AC & HS)    metronidazole IVPB 500 mg Q8H    ondansetron disintegrating tablet 8 mg Q8H PRN    oxyCODONE immediate release tablet 5 mg Q4H PRN    pantoprazole EC tablet 40 mg Daily    promethazine suppository 12.5 mg Q6H PRN    ramelteon tablet 8 mg Nightly PRN    sevelamer carbonate tablet 800 mg TID WM    sodium chloride 0.9% flush 3 mL PRN    ursodiol capsule 300 mg BID     Family History     Problem Relation (Age of Onset)    No Known Problems Mother        Social History Main Topics    Smoking status: Former Smoker     Packs/day: 1.00     Years: 10.00     Types: Cigarettes     Start date: 2/19/2017    Smokeless tobacco: Never Used    Alcohol use No      Comment: a fifth of liquor daily for years, cut back over last 2 months    Drug use: No    Sexual activity: Not on file     Review of Systems   Constitutional: Positive for activity change, appetite change, fatigue and fever. Negative for chills.   Respiratory: Negative for cough and shortness of breath.    Cardiovascular: Negative for  chest pain and leg swelling.   Gastrointestinal: Positive for abdominal pain, constipation and nausea. Negative for vomiting.   Genitourinary: Negative for decreased urine volume, difficulty urinating and dysuria.   Allergic/Immunologic: Positive for immunocompromised state.   Neurological: Positive for weakness.     Objective:     Vital Signs (Most Recent):  Temp: 98.6 °F (37 °C) (05/09/18 0801)  Pulse: 81 (05/09/18 0801)  Resp: 18 (05/09/18 0801)  BP: (!) 149/94 (05/09/18 0801)  SpO2: 96 % (05/09/18 0801)  O2 Device (Oxygen Therapy): room air (05/09/18 0801) Vital Signs (24h Range):  Temp:  [98.6 °F (37 °C)-99.2 °F (37.3 °C)] 98.6 °F (37 °C)  Pulse:  [81-98] 81  Resp:  [16-20] 18  SpO2:  [95 %-100 %] 96 %  BP: (149-160)/() 149/94     Weight: 61.3 kg (135 lb 2.3 oz) (05/09/18 0205)  Body mass index is 21.81 kg/m².  Body surface area is 1.69 meters squared.    I/O last 3 completed shifts:  In: 420.8 [I.V.:270.8; IV Piggyback:150]  Out: 100 [Urine:100]    Physical Exam   Constitutional: He is oriented to person, place, and time. He appears well-developed.   HENT:   Head: Normocephalic and atraumatic.   Eyes: EOM are normal. Right eye exhibits no discharge. Left eye exhibits no discharge. Scleral icterus is present.   Neck: Normal range of motion.   Cardiovascular: Normal rate, regular rhythm, normal heart sounds and intact distal pulses.  Exam reveals no gallop and no friction rub.    No murmur heard.  Pulmonary/Chest: Effort normal and breath sounds normal. No respiratory distress. He has no wheezes. He has no rales.   Abdominal: Soft. Bowel sounds are normal. He exhibits no distension. There is tenderness.   Musculoskeletal: Normal range of motion. He exhibits no edema.   Neurological: He is alert and oriented to person, place, and time.   Skin: Skin is warm and dry.   RIJ tunneled dialysis catheter   Psychiatric: He has a normal mood and affect. His behavior is normal. Judgment and thought content normal.    Nursing note and vitals reviewed.      Significant Labs:  CBC:   Recent Labs  Lab 05/09/18  0957   WBC 2.95*   RBC 2.33*   HGB 7.1*   HCT 21.9*   PLT 22*   MCV 94   MCH 30.5   MCHC 32.4     CMP:   Recent Labs  Lab 05/09/18  0525   GLU 69*   CALCIUM 8.3*   ALBUMIN 2.0*   PROT 5.4*      K 4.0   CO2 27      BUN 25*   CREATININE 3.7*   ALKPHOS 125   ALT <5*   AST 21   BILITOT 2.9*           Assessment/Plan:     ESRD Monday/Friday    ESRD on iHD M/F  [facility]   3 hours  ?58.7 kg?  RIJ tunneled dialysis catheter  + residual renal function    Plan  No acute need for RRT at this time.  Will continue outpatient schedule while in the hospital.    Follow-up with blood cultures, currently NGTD, ?r/o tunneled dialysis catheter infection?  Strict I/O's    Anemia of ESRD  VIDA with HD.      BMM  Low Phos diet  Phos/PTH/Vit D          Derrick Love, SAMREEN  Nephrology  [facility]  Pager:  [phone]      I have reviewed and concur with the NP's history, physical, assessment, and plan. I have personally interviewed and examined the patient at bedside.

## 2018-05-09 NOTE — HPI
"Mr. Jhonny Diana is a 26 yo male with PMHx of ETOH cirrhosis s/p OLTx in October of 2017 complicated by numerous hospitalization and complications including biliary stricture and chronic abdominal pain, HTN, and ESRD on HD Monday/Friday who presents to the hospital on 05/08 with chief complaint of fever and abdominal pain.  He reports that his abdominal pain is about the same has normal without significant change.  During dialysis on Monday, his mother states that he had a temp of 99.8 at the begging and end of treatment, which was confirmed by his dialysis unit.  She also reports hypertensive episodes during treatment which the nurse informed her of and appears his blood pressures were around 151/105-153/98 after treatment.  He states he was feeling "cold" during treatment, but denies rigors or chills.  No reports of exudate or signs of infection to tunneled dialysis catheter.  No ultrafiltration was performed on Mondays treatment and patient left unit with weight of 58.7 kg. Blood cultures drawn in the ED remain NGTD.  He reports good UOP without change.  Denies dysuria.   Nephrology was consulted for ESRD management.    He dialyzes at [facility] with Dr. Peterson on a M/F schedule for 3 hours.  EDW still being established, but left his treatment around 58.7 kg on Monday.  No signs of hypervolemia on examination.  He maintains good residual renal function.    "

## 2018-05-09 NOTE — PROGRESS NOTES
- Pt arrived TSU room 8071, accompanied by mother.  - Pt is well known to this RN; pt states he took all evening meds already.  - Urine specimen collected per order.  - Pt arrived with Rocephin infusing to peripheral IV via gravity; moved Rocephin to IV pump. Will hang Flagyl when Rocephin is finished.

## 2018-05-09 NOTE — PROGRESS NOTES
Admit Note     Met with patient to assess needs. Patient is a 27 y.o. single male, admitted for fevers post liver transplant.      Patient admitted from the emergency room on 5/8/2018 .  At this time, patient presents as alert and oriented x 4, pleasant, communicative, cooperative and asking and answering questions appropriately.  At this time, patients caregiver presents as alert and oriented x 4, pleasant, good eye contact, well groomed, calm and communicative. Pt had just returned from his ultrasound.      Household/Family Systems     Patient resides with patient's mother, at [address]  [place] [address].  Patient no longer has his apartment in [place].   Support system includes pts mom Sanjuanita Diana.  Patient does have support from his sister Miranda and aunt Sophy if needed.  Patient does not have dependents that are need of being cared for.     Patients primary caregiver is Sanjuanita Diana, patients mother, phone number [phone].  Confirmed patients contact information is [phone] (home);       Telephone Information:   Mobile [phone]   .     During admission, patient's caregiver plans to stay in patient's room.  Confirmed patient and patients caregivers do have access to reliable transportation.     Cognitive Status/Learning      Patient reports reading ability as college and states patient does have difficulty with comprehension and concentration due to pain at times but has been ok.    Patient reports patient learns best by written and verbal information and demonstration.     Needed: No.   Highest education level: Attended College/Technical School     Vocation/Disability   .  Working for Income: No  If no, reason not working: Demands of Treatment  Patient is in between STD and LTD with his employer [employer].  Patient was a Salesman with them.  Chip has continued to pay for pts medical insurance..Patient reports no income as LTD is pending, they are waiting on  a transfer, which should have happened in March 2018.       Adherence      Patient reports a high level of adherence to patients health care regimen.  Adherence counseling and education provided. Patient verbalizes understanding.     Substance Use     Patient reports the following substance usage.    Tobacco: none, patient denies any use.  Alcohol: none, patient denies any use. Patient's last ETOH use was 8/2017 when he stopped with diagnosis of liver disease.  Patient drank heavily in the past. Patient has not enrolled in [facility] due to continued medical complications.  Patient's mother had been in communication with [facility] in the past.  Illicit Drugs/Non-prescribed Medications: none, patient denies any use.  In 2011 addiction to Lortab.   Patient states clear understanding of the potential impact of substance use.  Substance abstinence/cessation counseling, education and resources provided and reviewed.      Services Utilizing/ADLS     Infusion Service: Prior to admission, patient utilizing? no  Home Health: Prior to admission, patient utilizing? no , has used [facility] in past   Outpatient Physical Rehab: [facility], pt had to cancel first appointment and never rescheduled. Pt not sure if he is willing to start again because he cannot stay out of the hospital.   DME: Prior to admission, yes RW and WC  Pulmonary/Cardiac Rehab: Prior to admission, no  Dialysis:  Prior to admission, yes [facility] [phone] (is considered chronic) MF at 3:30 pm   Transplant Specialty Pharmacy:  Prior to admission, yes; [facility].     Prior to admission, patient reports patient was independent with ADLS and was not driving.  Patient reports patient is not able to care for self at this time due to compromised medical condition (as documented in medical record) and physical weakness..  Patient indicates a willingness to care for self once medically cleared to do so.     Insurance/Medications     Insured by   Payor/Plan  Subscr  Sex Relation Sub. Ins. ID Effective Group Num   1. [facility]* RAFFY DIANA 1990 Male   [id number]                                   [address]      2. [facility]* RAFFY DIANA 1990 Male   [id number]                                   [address] [address]         Primary Insurance (for UNOS reporting): Private Insurance  Secondary Insurance (for UNOS reporting): None     Patient reports patient is able to obtain and afford medications at this time and at time of discharge.     Living Will/Healthcare Power of      Patient states patient has a LW and/or HCPA.   provided education regarding LW and HCPA and the completion of forms.     Coping/Mental Health     Patient is coping well with the aid of  family members. Patient has diffuculty with pain and medical complications since transplant.  Pt reports he just pushes through. SW provided liver transplant support group information for patient today.  Patient not receiving any psychotherapy or medication management, he has been able to manage symptoms on his own.  Patient denies mental health difficulties.      Discharge Planning     At time of discharge, patient plans to return to [place] apartments under the care of mother Sanjuanita Diana.  Patients mother will transport patient.  Per rounds today, expected discharge date has not been determined. Patient and patients caregiver  verbalize understanding and are involved in treatment planning and discharge process.     Additional Concerns     Patient is being followed for needs, education, resources, information, emotional support, supportive counseling, and for supportive and skilled discharge plan of care.  providing ongoing psychosocial support, education, resources and d/c planning as needed.  SW remains available. Patient denies additional needs and/or concerns at this time. Patient verbalizes understanding and agreement with  information reviewed, social work availability, and how to access available resources as needed.

## 2018-05-09 NOTE — ED NOTES
Pt is resting comfortably in stretcher with eyes open. Pt is AAOx3.  Respirations are full, even, and non labored. NAD noted. Pt denies pain at this time. No needs verbalized at this time. Call bell is in reach, side rails are up x 2, and bed is in lowest, locked, position. Pt and family updated on plan of care, verbalizes understanding

## 2018-05-09 NOTE — SUBJECTIVE & OBJECTIVE
Past Medical History:   Diagnosis Date    Alcoholic hepatitis with ascites     Alcoholic hepatitis with ascites     ESRD on dialysis     History of hematemesis 9/28/2017    Hypertension     Renal disorder     Seizures        Past Surgical History:   Procedure Laterality Date    APPENDECTOMY      ERCP      ESOPHAGOGASTRODUODENOSCOPY      LIVER TRANSPLANT         Review of patient's allergies indicates:   Allergen Reactions    Bactrim [sulfamethoxazole-trimethoprim] Other (See Comments)     Mookie Trell Syndrome     Current Facility-Administered Medications   Medication Frequency    0.9%  NaCl infusion (for blood administration) Q24H PRN    bisacodyl EC tablet 10 mg Daily    cefTRIAXone (ROCEPHIN) 2 g in dextrose 5 % 50 mL IVPB Q24H    cycloSPORINE modified (NEORAL) capsule 100 mg BID    dronabinol capsule 2.5 mg QHS    levETIRAcetam tablet 500 mg BID    levothyroxine tablet 75 mcg Before breakfast    magnesium sulfate 2g in water 50mL IVPB (premix) Once    metoclopramide HCl tablet 10 mg QID (AC & HS)    metronidazole IVPB 500 mg Q8H    ondansetron disintegrating tablet 8 mg Q8H PRN    oxyCODONE immediate release tablet 5 mg Q4H PRN    pantoprazole EC tablet 40 mg Daily    promethazine suppository 12.5 mg Q6H PRN    ramelteon tablet 8 mg Nightly PRN    sevelamer carbonate tablet 800 mg TID WM    sodium chloride 0.9% flush 3 mL PRN    ursodiol capsule 300 mg BID     Family History     Problem Relation (Age of Onset)    No Known Problems Mother        Social History Main Topics    Smoking status: Former Smoker     Packs/day: 1.00     Years: 10.00     Types: Cigarettes     Start date: 2/19/2017    Smokeless tobacco: Never Used    Alcohol use No      Comment: a fifth of liquor daily for years, cut back over last 2 months    Drug use: No    Sexual activity: Not on file     Review of Systems   Constitutional: Positive for activity change, appetite change, fatigue and fever. Negative for  chills.   Respiratory: Negative for cough and shortness of breath.    Cardiovascular: Negative for chest pain and leg swelling.   Gastrointestinal: Positive for abdominal pain, constipation and nausea. Negative for vomiting.   Genitourinary: Negative for decreased urine volume, difficulty urinating and dysuria.   Allergic/Immunologic: Positive for immunocompromised state.   Neurological: Positive for weakness.     Objective:     Vital Signs (Most Recent):  Temp: 98.6 °F (37 °C) (05/09/18 0801)  Pulse: 81 (05/09/18 0801)  Resp: 18 (05/09/18 0801)  BP: (!) 149/94 (05/09/18 0801)  SpO2: 96 % (05/09/18 0801)  O2 Device (Oxygen Therapy): room air (05/09/18 0801) Vital Signs (24h Range):  Temp:  [98.6 °F (37 °C)-99.2 °F (37.3 °C)] 98.6 °F (37 °C)  Pulse:  [81-98] 81  Resp:  [16-20] 18  SpO2:  [95 %-100 %] 96 %  BP: (149-160)/() 149/94     Weight: 61.3 kg (135 lb 2.3 oz) (05/09/18 0205)  Body mass index is 21.81 kg/m².  Body surface area is 1.69 meters squared.    I/O last 3 completed shifts:  In: 420.8 [I.V.:270.8; IV Piggyback:150]  Out: 100 [Urine:100]    Physical Exam   Constitutional: He is oriented to person, place, and time. He appears well-developed.   HENT:   Head: Normocephalic and atraumatic.   Eyes: EOM are normal. Right eye exhibits no discharge. Left eye exhibits no discharge. Scleral icterus is present.   Neck: Normal range of motion.   Cardiovascular: Normal rate, regular rhythm, normal heart sounds and intact distal pulses.  Exam reveals no gallop and no friction rub.    No murmur heard.  Pulmonary/Chest: Effort normal and breath sounds normal. No respiratory distress. He has no wheezes. He has no rales.   Abdominal: Soft. Bowel sounds are normal. He exhibits no distension. There is tenderness.   Musculoskeletal: Normal range of motion. He exhibits no edema.   Neurological: He is alert and oriented to person, place, and time.   Skin: Skin is warm and dry.   RIJ tunneled dialysis catheter    Psychiatric: He has a normal mood and affect. His behavior is normal. Judgment and thought content normal.   Nursing note and vitals reviewed.      Significant Labs:  CBC:   Recent Labs  Lab 05/09/18  0957   WBC 2.95*   RBC 2.33*   HGB 7.1*   HCT 21.9*   PLT 22*   MCV 94   MCH 30.5   MCHC 32.4     CMP:   Recent Labs  Lab 05/09/18  0525   GLU 69*   CALCIUM 8.3*   ALBUMIN 2.0*   PROT 5.4*      K 4.0   CO2 27      BUN 25*   CREATININE 3.7*   ALKPHOS 125   ALT <5*   AST 21   BILITOT 2.9*

## 2018-05-09 NOTE — CONSULTS
[facility]  Gastroenterology  Consult Note    Patient Name: Jhonny Diana  MRN: [id number]  Admission Date: 5/8/2018  Hospital Length of Stay: 0 days  Code Status: Full Code   Attending Provider: Jw Valdivia MD   Consulting Provider: Kimberli Kilpatrick MD  Primary Care Physician: Primary Doctor No  Principal Problem:<principal problem not specified>    Inpatient consult to [facility]  Consult performed by: KIMBERLI KILPATRICK  Consult ordered by: KEVIN GEE        Subjective:     HPI:  27 year old male with a history of OLTX (10/19/17) with steroid induction and ESRD on dialysis on who AES was consulted to evaluate need for possible ERCP in the setting of fever/elevated bilirubin.    Per patient since leaving the hospital on 5/4/18, patient's mother reports continued fevers as high as 100.8 therefore prompting patient to come to the ED. Apart from the fever he continued to complain of decrease appetite and fatigue. His complains of nausea and abdominal pain are chronic complaints.     This morning patient reported to be feeling OK and slightly sleepy. He has remained afebrile since being admitted with LFT's trending down today.     Prior GI procedures:  ERCP 4/30/18  A previously placed stent had partially migrated into the biliary tree. The distal portion of the stent was 1 mm above the biliary os. This was adjusted by pulling the stent partially out of the bile duct.  The biliary tree was swept and sludge was found.  Repeat ERCP in 2 months to remove stent.    ERCP 3/9/18  Two stents were removed from the biliary tree.  Choledocholithiasis was found. Complete removal was accomplished by balloon extraction.  A mild insignificant biliary stricture was found in the post-transplant anastomosis. The stricture was post-surgical. Unlikely to be the cause of this patient's fever.  One covered metal stent was placed across the stricture given that patient has fever.  Repeat ERCP  in 4 months to remove stent.    Past Medical History:   Diagnosis Date    Alcoholic hepatitis with ascites     Alcoholic hepatitis with ascites     ESRD on dialysis     History of hematemesis 9/28/2017    Hypertension     Renal disorder     Seizures        Past Surgical History:   Procedure Laterality Date    APPENDECTOMY      ERCP      ESOPHAGOGASTRODUODENOSCOPY      LIVER TRANSPLANT         Review of patient's allergies indicates:   Allergen Reactions    Bactrim [sulfamethoxazole-trimethoprim] Other (See Comments)     Mookie Trell Syndrome     Family History     Problem Relation (Age of Onset)    No Known Problems Mother        Social History Main Topics    Smoking status: Former Smoker     Packs/day: 1.00     Years: 10.00     Types: Cigarettes     Start date: 2/19/2017    Smokeless tobacco: Never Used    Alcohol use No      Comment: a fifth of liquor daily for years, cut back over last 2 months    Drug use: No    Sexual activity: Not on file     Review of Systems   Constitutional: Positive for appetite change, fatigue and fever. Negative for activity change, chills and diaphoresis.   HENT: Negative for trouble swallowing.    Respiratory: Negative.    Cardiovascular: Negative.    Gastrointestinal: Negative.    Genitourinary: Negative.    Neurological: Negative.      Objective:     Vital Signs (Most Recent):  Temp: 98.6 °F (37 °C) (05/09/18 0801)  Pulse: 81 (05/09/18 0801)  Resp: 18 (05/09/18 0801)  BP: (!) 149/94 (05/09/18 0801)  SpO2: 96 % (05/09/18 0801) Vital Signs (24h Range):  Temp:  [98.6 °F (37 °C)-99.2 °F (37.3 °C)] 98.6 °F (37 °C)  Pulse:  [81-98] 81  Resp:  [16-20] 18  SpO2:  [95 %-100 %] 96 %  BP: (149-160)/() 149/94     Weight: 61.3 kg (135 lb 2.3 oz) (05/09/18 0205)  Body mass index is 21.81 kg/m².      Intake/Output Summary (Last 24 hours) at 05/09/18 0914  Last data filed at 05/09/18 0610   Gross per 24 hour   Intake           420.83 ml   Output              100 ml   Net            320.83 ml       Lines/Drains/Airways     Central Venous Catheter Line                 Hemodialysis Catheter right internal jugular -- days          Peripheral Intravenous Line                 Peripheral IV - Single Lumen 05/08/18 2303 Right Antecubital less than 1 day                Physical Exam   Constitutional: He is oriented to person, place, and time. No distress.   HENT:   Head: Normocephalic and atraumatic.   Eyes: No scleral icterus.   Neck: Normal range of motion.   Cardiovascular: Normal rate and regular rhythm.    Pulmonary/Chest: Effort normal and breath sounds normal.   Abdominal: Soft. Bowel sounds are normal. He exhibits no distension. There is no tenderness.   Well healed abdominal scar from liver transplant   Musculoskeletal: He exhibits no edema.   Neurological: He is alert and oriented to person, place, and time.   Skin: He is not diaphoretic.       Significant Labs:  CBC:   Recent Labs  Lab 05/08/18  2259 05/09/18  0525   WBC 3.38* 2.63*   HGB 7.5* 5.8*   HCT 22.6* 17.2*   PLT 26* 18*     CMP:   Recent Labs  Lab 05/09/18  0525   GLU 69*   CALCIUM 8.3*   ALBUMIN 2.0*   PROT 5.4*      K 4.0   CO2 27      BUN 25*   CREATININE 3.7*   ALKPHOS 125   ALT <5*   AST 21   BILITOT 2.9*     Coagulation:   Recent Labs  Lab 05/09/18  0525   INR 1.0       Significant Imaging:  Imaging results within the past 24 hours have been reviewed.    Assessment/Plan:     Elevated LFTs    27 year old male with a history of OLTX (10/19/17) with steroid induction and ESRD on dialysis on who AES was consulted to evaluate need for possible ERCP in the setting of fever/elevated bilirubin.    Patient is well known to AES as in the past 2 months he has had ERCP X 2. Currently he remains afebrile, with no leukocytosis, and LFT's trending down. At this point recommend an abdominal xray and holding off on ERCP.    Recommendations:  --Monitor fever curve, WBC, and LFT's  --Antibiotics per primary team  --Abdominal  Xray to make sure stent has not migrated  --No indication for ERCP at this time            Thank you for your consult. I will follow-up with patient. Please contact us if you have any additional questions.    Divya Booker M.D.  Gastroenterology Fellow, PGY-IV  Pager: [phone]  [facility]

## 2018-05-09 NOTE — ED NOTES
Pt is AAOx3.Behavior is age appropriate and pleasant. Skin is warm, dry, intact, and jaundiced. Mucous membranes are pink and moist. Airway is patent.  Respirations are full, even, and non labored. Breath sounds are present and clear throughout all lung fields.  Abdomen is soft, flat, non distended, and non tender x 4 quads. Normoactive bowel sounds present x 4 quadrants. Pulses are palpable in bilateral radial arteries. Capillary refill is brisk and  < 3 seconds in bilateral fingers. S1, S2 heart tones are present. No neuro deficits noted. Pt's identity confirmed and armband applied. Pt updated on plan of care. Pt awaiting ED physician. No needs verbalized. Pain at this time is 5/10 to abdomen.  Will continue to monitor.

## 2018-05-09 NOTE — PROGRESS NOTES
[facility]  Liver Transplant  Progress Note    Patient Name: Jhonny Diana  MRN: [id number]  Admission Date: 2018  Hospital Length of Stay: 0 days  Code Status: Full Code  Primary Care Provider: Primary Doctor No  Post-Operative Day: 202    ORGAN:   LIVER  Disease Etiology: Acute Alcoholic Hepatitis  Donor Type:    - Brain Death  CDC High Risk:   No  Donor CMV Status:   Donor CMV Status: Positive  Donor HBcAB:   Negative  Donor HCV Status:   Negative  Whole or Partial: Whole Liver  Biliary Anastomosis: End to End  Arterial Anatomy: Standard  Subjective:     History of Present Illness:  Mr. 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  S/p OLTX 10/19/2017 (Liver) with steroid induction. Post op course with multiple complications to include biliary stricture, FUO, ESRD, and chronic abdominal pain. Recently discharged on  after an ERCP. Reports that since discharge he has had fevers of 100.4 to 100.8. These fevers resolve on their own without medication. Also reports decreased appetite and fatigue. Nausea and abdominal pain are baseline and unchanged from prior to discharge. Normal bowel movements and passing gas. Receiving dialysis Monday and Friday. On admission, patient was afebrile and hemodynamically stable. Labs were stable from prior except tbili which is up to 3.9 from 3.2 yesterday.     Hospital Course:  Interval history: [facility] consulted for possible ERCP.  Per their recommendations no ERCP at this time as imaging shows stent in place.  Has been afebrile since admission - will continue empiric ceftriaxone for now.  Noted to have chronic anemia with low haptoglobin and elevated LDH.  Autoimmune process suspected, hematology consulted.    No new subjective & objective note has been filed under this hospital service since the last note was generated.    Assessment/Plan:     * Fever    - admitted with low grade fevers  - infectious work up negative thus far  - empiric  antibiotics initiated          Severe protein-calorie malnutrition    - continue dronabinol for appetite stimulant  - po supplements        Anemia of chronic renal failure    - h/h chronically low  - continue epoetin  - hemolysis labs concerning - hematology consulted          Biliary stricture of transplanted liver    - stent in place per imaging  - PBS consulted for possible ERCP - not recommending ERCP for now          S/P liver transplant    - LFTs stable  - liver u/s reviewed - unremarkable  - monitor          Long-term use of immunosuppressant medication              Prophylactic immunotherapy    - continue cyclosporin  - monitor cya levels daily and adjust for therapeutic dose          Hyperphosphatemia    - continue sevelamer carbonate  - monitor labs daily          Vitamin D deficiency              ESRD Monday/Friday    - nephrology following  - making some urine, however not clearing  - Monday/friday dialysis schedule          Hypomagnesemia    - iv replacement  - monitor labs daily            VTE Risk Mitigation         Ordered     IP VTE HIGH RISK PATIENT  Once      05/09/18 0044          The patients clinical status was discussed at multidisplinary rounds, involving transplant surgery, transplant medicine, pharmacy, nursing, nutrition, and social work    Discharge Planning:  Monitor kidney function --> may need outpt HD  Monitor HH needs vs rehab for deconditioned status      Seda Camacho, NP  Liver Transplant  [facility]

## 2018-05-09 NOTE — ED TRIAGE NOTES
Pt reports a fever tonight with sneezing and sinus congestion. He also reports abd pain. Pt appears jaundiced. Pt reports a liver transplant in october

## 2018-05-09 NOTE — HOSPITAL COURSE
Interval History: no acute events overnight. Hematology consulted for worsening thrombocytopenia.  Plts improving slowly- 25 again today.  Restarted cya 5/21 as improvement of platelets attributed to steroids.  Transitioned to steroids, and will taper down by 10 mg per week until 10mg daily.  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  Prealbumin 10 on 5/14, previously 17 a month ago, continue to encourage oral intake.  Appetite has improved greatly with addition of steroids.  Prealbumin Tuesday 5/22 improved to 20.

## 2018-05-09 NOTE — SUBJECTIVE & OBJECTIVE
Past Medical History:   Diagnosis Date    Alcoholic hepatitis with ascites     Alcoholic hepatitis with ascites     ESRD on dialysis     History of hematemesis 9/28/2017    Hypertension     Renal disorder     Seizures        Past Surgical History:   Procedure Laterality Date    APPENDECTOMY      ERCP      ESOPHAGOGASTRODUODENOSCOPY      LIVER TRANSPLANT         Review of patient's allergies indicates:   Allergen Reactions    Bactrim [sulfamethoxazole-trimethoprim] Other (See Comments)     Mookie Trell Syndrome     Family History     Problem Relation (Age of Onset)    No Known Problems Mother        Social History Main Topics    Smoking status: Former Smoker     Packs/day: 1.00     Years: 10.00     Types: Cigarettes     Start date: 2/19/2017    Smokeless tobacco: Never Used    Alcohol use No      Comment: a fifth of liquor daily for years, cut back over last 2 months    Drug use: No    Sexual activity: Not on file     Review of Systems   Constitutional: Positive for appetite change, fatigue and fever. Negative for activity change, chills and diaphoresis.   HENT: Negative for trouble swallowing.    Respiratory: Negative.    Cardiovascular: Negative.    Gastrointestinal: Negative.    Genitourinary: Negative.    Neurological: Negative.      Objective:     Vital Signs (Most Recent):  Temp: 98.6 °F (37 °C) (05/09/18 0801)  Pulse: 81 (05/09/18 0801)  Resp: 18 (05/09/18 0801)  BP: (!) 149/94 (05/09/18 0801)  SpO2: 96 % (05/09/18 0801) Vital Signs (24h Range):  Temp:  [98.6 °F (37 °C)-99.2 °F (37.3 °C)] 98.6 °F (37 °C)  Pulse:  [81-98] 81  Resp:  [16-20] 18  SpO2:  [95 %-100 %] 96 %  BP: (149-160)/() 149/94     Weight: 61.3 kg (135 lb 2.3 oz) (05/09/18 0205)  Body mass index is 21.81 kg/m².      Intake/Output Summary (Last 24 hours) at 05/09/18 0914  Last data filed at 05/09/18 0610   Gross per 24 hour   Intake           420.83 ml   Output              100 ml   Net           320.83 ml        Lines/Drains/Airways     Central Venous Catheter Line                 Hemodialysis Catheter right internal jugular -- days          Peripheral Intravenous Line                 Peripheral IV - Single Lumen 05/08/18 2303 Right Antecubital less than 1 day                Physical Exam   Constitutional: He is oriented to person, place, and time. No distress.   HENT:   Head: Normocephalic and atraumatic.   Eyes: No scleral icterus.   Neck: Normal range of motion.   Cardiovascular: Normal rate and regular rhythm.    Pulmonary/Chest: Effort normal and breath sounds normal.   Abdominal: Soft. Bowel sounds are normal. He exhibits no distension. There is no tenderness.   Well healed abdominal scar from liver transplant   Musculoskeletal: He exhibits no edema.   Neurological: He is alert and oriented to person, place, and time.   Skin: He is not diaphoretic.       Significant Labs:  CBC:   Recent Labs  Lab 05/08/18 2259 05/09/18  0525   WBC 3.38* 2.63*   HGB 7.5* 5.8*   HCT 22.6* 17.2*   PLT 26* 18*     CMP:   Recent Labs  Lab 05/09/18  0525   GLU 69*   CALCIUM 8.3*   ALBUMIN 2.0*   PROT 5.4*      K 4.0   CO2 27      BUN 25*   CREATININE 3.7*   ALKPHOS 125   ALT <5*   AST 21   BILITOT 2.9*     Coagulation:   Recent Labs  Lab 05/09/18  0525   INR 1.0       Significant Imaging:  Imaging results within the past 24 hours have been reviewed.

## 2018-05-09 NOTE — ASSESSMENT & PLAN NOTE
- admitted with low grade fevers  - infectious work up negative thus far  - empiric antibiotics initiated

## 2018-05-09 NOTE — ASSESSMENT & PLAN NOTE
- nephrology following  - making some urine, however not clearing  - Monday/friday dialysis schedule

## 2018-05-09 NOTE — PLAN OF CARE
Problem: Patient Care Overview  Goal: Plan of Care Review  Outcome: Ongoing (interventions implemented as appropriate)  POC reviewed w/ pt and pt's mother this am to include infectious workup, IVF, and possible HD.  Pt afebrile today.  Hematology and nephrology consulted.  Labs ordered and drawn, no HD today per nephro.  H&H low this am but stable on repeat draw.  IVF d/c'd per primary team.  Abdominal x-ray done this am.  Pt received IV mag replacement.  Pt c/o diffuse abdominal pain, moderately controlled on prn pain medication.  Pt's R subclavian permcath dressing changed per protocol.  Pt switched from NPO to regular diet w/ no appetite.

## 2018-05-09 NOTE — CONSULTS
[facility]  Hematology/Oncology  Consult Note    Patient Name: Jhonny Diana  MRN: [id number]  Admission Date: 5/8/2018  Hospital Length of Stay: 0 days  Code Status: Full Code   Attending Provider: Jw Valdivia MD  Consulting Provider: Bing Robles MD  Primary Care Physician: Primary Doctor No  Principal Problem:<principal problem not specified>    Inpatient consult to Hematology  Consult performed by: BING ROBLES.  Consult ordered by: VANDANA SAMUEL        Subjective:     HPI: Mr. Diana is a 28 y/o M with PMHx of ETOH cirrhosis s/p OLTx in October of 2017 complicated by numerous hospitalization and complications including biliary stricture and chronic abdominal pain, HTN, and ESRD on HD Monday/Friday who presents to the hospital on 05/08 with chief complaint of fever and abdominal pain.  Recently discharged on 5/4 after a prolonged hospital stay. Reports that since discharge he has had fevers of 100.4 to 100.8. These fevers resolve on their own without medication. Also reports decreased appetite and fatigue. Nausea and abdominal pain are baseline and unchanged from prior to discharge. Normal bowel movements and passing gas. Receiving dialysis Monday and Friday. On admission, patient was afebrile and hemodynamically stable. Labs were stable from prior except tbili which is up to 3.9 from 3.2 yesterday. During this admission, he was noted to be anemic. Hematology consulted for evaluation.     Medications:  Continuous Infusions:  Scheduled Meds:   bisacodyl  10 mg Oral Daily    cefTRIAXone (ROCEPHIN) IVPB  2 g Intravenous Q24H    cycloSPORINE modified (NEORAL)  100 mg Oral BID    dronabinol  2.5 mg Oral QHS    [START ON 5/11/2018] epoetin maurisio (PROCRIT) injection  20,000 Units Intravenous Every Mon, Fri    levETIRAcetam  500 mg Oral BID    levothyroxine  75 mcg Oral Before breakfast    magnesium sulfate in water  2 g Intravenous Once    metoclopramide HCl  10 mg Oral QID (AC & HS)     metronidazole  500 mg Intravenous Q8H    pantoprazole  40 mg Oral Daily    sevelamer carbonate  800 mg Oral TID WM    ursodiol  300 mg Oral BID     PRN Meds:sodium chloride, ondansetron, oxyCODONE, promethazine, ramelteon, sodium chloride 0.9%     Review of patient's allergies indicates:   Allergen Reactions    Bactrim [sulfamethoxazole-trimethoprim] Other (See Comments)     Mookie Trell Syndrome        Past Medical History:   Diagnosis Date    Alcoholic hepatitis with ascites     Alcoholic hepatitis with ascites     ESRD on dialysis     History of hematemesis 9/28/2017    Hypertension     Renal disorder     Seizures      Past Surgical History:   Procedure Laterality Date    APPENDECTOMY      ERCP      ESOPHAGOGASTRODUODENOSCOPY      LIVER TRANSPLANT       Family History     Problem Relation (Age of Onset)    No Known Problems Mother        Social History Main Topics    Smoking status: Former Smoker     Packs/day: 1.00     Years: 10.00     Types: Cigarettes     Start date: 2/19/2017    Smokeless tobacco: Never Used    Alcohol use No      Comment: a fifth of liquor daily for years, cut back over last 2 months    Drug use: No    Sexual activity: Not on file       Review of Systems   Constitutional: Positive for activity change, appetite change, fatigue and fever. Negative for chills.   Respiratory: Negative for cough and shortness of breath.    Cardiovascular: Negative for chest pain and leg swelling.   Gastrointestinal: Positive for abdominal pain, constipation and nausea. Negative for vomiting.   Genitourinary: Negative for decreased urine volume, difficulty urinating and dysuria.   Allergic/Immunologic: Positive for immunocompromised state.   Neurological: Positive for weakness.     Objective:     Vital Signs (Most Recent):  Temp: 99.3 °F (37.4 °C) (05/09/18 1211)  Pulse: 86 (05/09/18 1211)  Resp: 18 (05/09/18 0801)  BP: (!) 159/96 (05/09/18 1211)  SpO2: (!) 94 % (05/09/18 1211) Vital  Signs (24h Range):  Temp:  [98.6 °F (37 °C)-99.3 °F (37.4 °C)] 99.3 °F (37.4 °C)  Pulse:  [81-98] 86  Resp:  [16-20] 18  SpO2:  [94 %-100 %] 94 %  BP: (149-160)/() 159/96     Weight: 61.3 kg (135 lb 2.3 oz)  Body mass index is 21.81 kg/m².  Body surface area is 1.69 meters squared.      Intake/Output Summary (Last 24 hours) at 05/09/18 1247  Last data filed at 05/09/18 0610   Gross per 24 hour   Intake           420.83 ml   Output              100 ml   Net           320.83 ml       Physical Exam  Constitutional: He is oriented to person, place, and time. He appears well-developed.   HENT:   Head: Normocephalic and atraumatic.   Eyes: EOM are normal. Right eye exhibits no discharge. Left eye exhibits no discharge. Scleral icterus is present.   Neck: Normal range of motion.   Cardiovascular: Normal rate, regular rhythm, normal heart sounds and intact distal pulses.   Pulmonary/Chest: Effort normal and breath sounds normal. No respiratory distress. He has no wheezes. He has no rales.   Abdominal: Soft. Bowel sounds are normal. He exhibits no distension. There is tenderness. Old surgical scar.   Musculoskeletal: Normal range of motion. He exhibits no edema.   Neurological: He is alert and oriented to person, place, and time.   Skin: Skin is warm and dry. RIJ tunneled dialysis catheter   Psychiatric: He has a normal mood and affect. His behavior is normal. Judgment and thought content normal.     Significant Labs:   CBC:   Recent Labs  Lab 05/08/18 2259 05/09/18  0525 05/09/18  0957   WBC 3.38* 2.63* 2.95*   HGB 7.5* 5.8* 7.1*   HCT 22.6* 17.2* 21.9*   PLT 26* 18* 22*    and CMP:   Recent Labs  Lab 05/08/18 2259 05/09/18  0525    138   K 4.6 4.0   CL 99 102   CO2 27 27   GLU 80 69*   BUN 22* 25*   CREATININE 3.6* 3.7*   CALCIUM 9.0 8.3*   PROT 6.9 5.4*   ALBUMIN 2.4* 2.0*   BILITOT 3.9* 2.9*   ALKPHOS 151* 125   AST 36 21   ALT 6* <5*   ANIONGAP 10 9   EGFRNONAA 21.7* 21.0*       Diagnostic Results:  I have  reviewed all pertinent imaging results/findings within the past 24 hours.    Assessment/Plan:     Active Diagnoses:    Diagnosis Date Noted POA    Fever [R50.9] 05/09/2018 Yes    Anemia of chronic renal failure [N18.9, D63.1]  Unknown    Chronic kidney disease-mineral and bone disorder [N18.9, E83.9, M89.9]  Unknown    Elevated LFTs [R79.89] 04/30/2018 Yes    ESRD Monday/Friday [N18.6] 01/13/2018 Yes      Problems Resolved During this Admission:    Diagnosis Date Noted Date Resolved POA     Pancytopenia  Anemia of Chronic Renal Insufficiency  ESRD on iHD  Hx of OLT    - noted to have Hb: 8.4 g/dL with MCV 92 on admission, with Platelet count of 22,000/mm3 and WBC: 3,150/mm3 and ANC: 1,400/mm3.     - upon chart review, during his recent admission he was also noted to be anemia and required blood transfusions. His baseline Hb ranges 8-9 g/dL and Plt count 50-60,000/mm3.    - hemolysis lab work are concerning given low haptoglobin and elevated LDH.  - however, his reticulocyte count is only slightly elevated and peripheral smear did not show any significant amount of schistocytes to suggest microangiopathic hemolysis or TMA/TTP.   - direct flora negative, less likely to be autoimmune hemolytic anemia.  - [id number] drawn and sent off to r/o TTP, although index of suspicion remains low.   - check fibrinogen, folate, TSH.   - VIDA support per Nephrology.   - will monitor CBC and Hemolysis Panel for now.  - will f/u above studies before considering further work-up.       Thank you for your consult. I will follow-up with patient. Please contact us if you have any additional questions.    Dereje Robles MD  Hematology/Oncology  [facility]    STAFF NOTE:  I have personally taken the history and examined this patient and agree with Dr. Robles's Note as stated above.

## 2018-05-09 NOTE — SUBJECTIVE & OBJECTIVE
Scheduled Meds:   bisacodyl  10 mg Oral Daily    cefTRIAXone (ROCEPHIN) IVPB  2 g Intravenous Q24H    cycloSPORINE modified (NEORAL)  100 mg Oral BID    dronabinol  2.5 mg Oral QHS    [START ON 5/11/2018] epoetin maurisio (PROCRIT) injection  20,000 Units Intravenous Every Mon, Fri    levETIRAcetam  500 mg Oral BID    levothyroxine  75 mcg Oral Before breakfast    metoclopramide HCl  10 mg Oral QID (AC & HS)    metronidazole  500 mg Intravenous Q8H    pantoprazole  40 mg Oral Daily    sevelamer carbonate  800 mg Oral TID WM    ursodiol  300 mg Oral BID     Continuous Infusions:  PRN Meds:sodium chloride, ondansetron, oxyCODONE, promethazine, ramelteon, sodium chloride 0.9%    Review of Systems   Constitutional: Positive for activity change, appetite change and fever. Negative for fatigue.   HENT: Negative for ear pain, hearing loss and sore throat.    Eyes: Negative for photophobia, discharge and visual disturbance.   Respiratory: Negative for cough, chest tightness, shortness of breath and wheezing.    Cardiovascular: Negative for chest pain, palpitations and leg swelling.   Gastrointestinal: Positive for abdominal pain and nausea. Negative for abdominal distention, blood in stool, constipation, diarrhea and vomiting.   Genitourinary: Negative for difficulty urinating, dysuria, flank pain, frequency, hematuria and urgency.   Musculoskeletal: Negative for arthralgias, back pain, gait problem and neck pain.   Skin: Negative for pallor and rash.   Allergic/Immunologic: Positive for immunocompromised state.   Neurological: Positive for weakness. Negative for dizziness, tremors, syncope, numbness and headaches.   Hematological: Does not bruise/bleed easily.   Psychiatric/Behavioral: Negative for confusion and hallucinations. The patient is not nervous/anxious.      Objective:     Vital Signs (Most Recent):  Temp: 99.3 °F (37.4 °C) (05/09/18 1211)  Pulse: 86 (05/09/18 1211)  Resp: 18 (05/09/18 0801)  BP: (!)  159/96 (05/09/18 1211)  SpO2: (!) 94 % (05/09/18 1211) Vital Signs (24h Range):  Temp:  [98.6 °F (37 °C)-99.3 °F (37.4 °C)] 99.3 °F (37.4 °C)  Pulse:  [81-98] 86  Resp:  [16-20] 18  SpO2:  [94 %-100 %] 94 %  BP: (149-160)/() 159/96     Weight: 61.3 kg (135 lb 2.3 oz)  Body mass index is 21.81 kg/m².    Intake/Output - Last 3 Shifts       05/07 0700 - 05/08 0659 05/08 0700 - 05/09 0659 05/09 0700 - 05/10 0659    I.V. (mL/kg)  270.8 (4.4)     IV Piggyback  150     Total Intake(mL/kg)  420.8 (6.9)     Urine (mL/kg/hr)  100     Total Output   100      Net   +320.8             Stool Occurrence  0 x           Physical Exam    Laboratory:  Immunosuppressants         Stop Route Frequency     cycloSPORINE modified (NEORAL) capsule 100 mg      -- Oral 2 times daily        CBC:   Recent Labs  Lab 05/09/18  0957   WBC 2.95*   RBC 2.33*   HGB 7.1*   HCT 21.9*   PLT 22*   MCV 94   MCH 30.5   MCHC 32.4     CMP:   Recent Labs  Lab 05/09/18  0525   GLU 69*   CALCIUM 8.3*   ALBUMIN 2.0*   PROT 5.4*      K 4.0   CO2 27      BUN 25*   CREATININE 3.7*   ALKPHOS 125   ALT <5*   AST 21   BILITOT 2.9*     Coagulation:   Recent Labs  Lab 05/09/18  0525   INR 1.0     Labs within the past 24 hours have been reviewed.    Diagnostic Results:  US Liver Transplant Post:    Satisfactory doppler evaluation of transplanted liver.    Grossly stable complex fluid collection superior to the right hepatic lobe.    Moderate volume of ascites.    Common duct stent is in place with persistent pneumobilia.

## 2018-05-10 LAB
25(OH)D3+25(OH)D2 SERPL-MCNC: 18 NG/ML
ALBUMIN SERPL BCP-MCNC: 2.1 G/DL
ALP SERPL-CCNC: 129 U/L
ALT SERPL W/O P-5'-P-CCNC: <5 U/L
ANION GAP SERPL CALC-SCNC: 10 MMOL/L
AST SERPL-CCNC: 20 U/L
BACTERIA SPEC ANAEROBE CULT: NORMAL
BACTERIA UR CULT: NORMAL
BASOPHILS # BLD AUTO: 0.01 K/UL
BASOPHILS NFR BLD: 0.3 %
BASOPHILS NFR BLD: 0.3 %
BASOPHILS NFR BLD: 0.4 %
BILIRUB DIRECT SERPL-MCNC: 1.5 MG/DL
BILIRUB SERPL-MCNC: 2.1 MG/DL
BLD PROD TYP BPU: NORMAL
BLD PROD TYP BPU: NORMAL
BLOOD UNIT EXPIRATION DATE: NORMAL
BLOOD UNIT EXPIRATION DATE: NORMAL
BLOOD UNIT TYPE CODE: 5100
BLOOD UNIT TYPE CODE: 5100
BLOOD UNIT TYPE: NORMAL
BLOOD UNIT TYPE: NORMAL
BUN SERPL-MCNC: 33 MG/DL
CALCIUM SERPL-MCNC: 8.3 MG/DL
CHLORIDE SERPL-SCNC: 101 MMOL/L
CMV DNA SERPL NAA+PROBE-ACNC: NORMAL IU/ML
CO2 SERPL-SCNC: 24 MMOL/L
CODING SYSTEM: NORMAL
CODING SYSTEM: NORMAL
CREAT SERPL-MCNC: 4.7 MG/DL
CYCLOSPORINE BLD LC/MS/MS-MCNC: 263 NG/ML
DIFFERENTIAL METHOD: ABNORMAL
DISPENSE STATUS: NORMAL
DISPENSE STATUS: NORMAL
EOSINOPHIL # BLD AUTO: 0.1 K/UL
EOSINOPHIL NFR BLD: 1.8 %
EOSINOPHIL NFR BLD: 2.1 %
EOSINOPHIL NFR BLD: 2.5 %
ERYTHROCYTE [DISTWIDTH] IN BLOOD BY AUTOMATED COUNT: 14.7 %
ERYTHROCYTE [DISTWIDTH] IN BLOOD BY AUTOMATED COUNT: 14.8 %
ERYTHROCYTE [DISTWIDTH] IN BLOOD BY AUTOMATED COUNT: 14.9 %
EST. GFR  (AFRICAN AMERICAN): 18.2 ML/MIN/1.73 M^2
EST. GFR  (NON AFRICAN AMERICAN): 15.7 ML/MIN/1.73 M^2
FERRITIN SERPL-MCNC: 3834 NG/ML
FIBRINOGEN PPP-MCNC: 299 MG/DL
GLUCOSE SERPL-MCNC: 80 MG/DL
HAPTOGLOB SERPL-MCNC: <10 MG/DL
HCT VFR BLD AUTO: 16 %
HCT VFR BLD AUTO: 18.2 %
HCT VFR BLD AUTO: 20.5 %
HGB BLD-MCNC: 5.1 G/DL
HGB BLD-MCNC: 5.9 G/DL
HGB BLD-MCNC: 6.7 G/DL
IMM GRANULOCYTES # BLD AUTO: 0.02 K/UL
IMM GRANULOCYTES # BLD AUTO: 0.03 K/UL
IMM GRANULOCYTES # BLD AUTO: 0.03 K/UL
IMM GRANULOCYTES NFR BLD AUTO: 0.7 %
IMM GRANULOCYTES NFR BLD AUTO: 0.9 %
IMM GRANULOCYTES NFR BLD AUTO: 1.1 %
INR PPP: 1
IRON SERPL-MCNC: 54 UG/DL
LDH SERPL L TO P-CCNC: 583 U/L
LYMPHOCYTES # BLD AUTO: 0.8 K/UL
LYMPHOCYTES # BLD AUTO: 0.9 K/UL
LYMPHOCYTES # BLD AUTO: 0.9 K/UL
LYMPHOCYTES NFR BLD: 27.3 %
LYMPHOCYTES NFR BLD: 27.9 %
LYMPHOCYTES NFR BLD: 29.6 %
MCH RBC QN AUTO: 30.4 PG
MCH RBC QN AUTO: 30.4 PG
MCH RBC QN AUTO: 30.6 PG
MCHC RBC AUTO-ENTMCNC: 31.9 G/DL
MCHC RBC AUTO-ENTMCNC: 32.4 G/DL
MCHC RBC AUTO-ENTMCNC: 32.7 G/DL
MCV RBC AUTO: 94 FL
MCV RBC AUTO: 94 FL
MCV RBC AUTO: 95 FL
MONOCYTES # BLD AUTO: 0.3 K/UL
MONOCYTES NFR BLD: 11 %
MONOCYTES NFR BLD: 11.2 %
MONOCYTES NFR BLD: 8.2 %
NEUTROPHILS # BLD AUTO: 1.6 K/UL
NEUTROPHILS # BLD AUTO: 1.6 K/UL
NEUTROPHILS # BLD AUTO: 2.1 K/UL
NEUTROPHILS NFR BLD: 56.3 %
NEUTROPHILS NFR BLD: 56.9 %
NEUTROPHILS NFR BLD: 61.5 %
NRBC BLD-RTO: 0 /100 WBC
NUM UNITS TRANS PACKED RBC: NORMAL
NUM UNITS TRANS PACKED RBC: NORMAL
PHOSPHATE SERPL-MCNC: 4.2 MG/DL
PLATELET # BLD AUTO: 21 K/UL
PLATELET # BLD AUTO: 21 K/UL
PLATELET # BLD AUTO: 25 K/UL
PLATELET BLD QL SMEAR: ABNORMAL
PMV BLD AUTO: 11.7 FL
PMV BLD AUTO: ABNORMAL FL
PMV BLD AUTO: ABNORMAL FL
POTASSIUM SERPL-SCNC: 3.9 MMOL/L
PROT SERPL-MCNC: 5.4 G/DL
PROTHROMBIN TIME: 10.6 SEC
PTH-INTACT SERPL-MCNC: 21 PG/ML
RBC # BLD AUTO: 1.68 M/UL
RBC # BLD AUTO: 1.94 M/UL
RBC # BLD AUTO: 2.19 M/UL
RETICS/RBC NFR AUTO: 2.8 %
SATURATED IRON: 41 %
SODIUM SERPL-SCNC: 135 MMOL/L
TOTAL IRON BINDING CAPACITY: 133 UG/DL
TRANSFERRIN SERPL-MCNC: 90 MG/DL
WBC # BLD AUTO: 2.76 K/UL
WBC # BLD AUTO: 2.91 K/UL
WBC # BLD AUTO: 3.41 K/UL

## 2018-05-10 PROCEDURE — 88184 FLOWCYTOMETRY/ TC 1 MARKER: CPT | Mod: NTX

## 2018-05-10 PROCEDURE — 63600175 PHARM REV CODE 636 W HCPCS: Mod: NTX | Performed by: STUDENT IN AN ORGANIZED HEALTH CARE EDUCATION/TRAINING PROGRAM

## 2018-05-10 PROCEDURE — 85025 COMPLETE CBC W/AUTO DIFF WBC: CPT | Mod: 91,NTX

## 2018-05-10 PROCEDURE — 36430 TRANSFUSION BLD/BLD COMPNT: CPT | Mod: NTX

## 2018-05-10 PROCEDURE — S0030 INJECTION, METRONIDAZOLE: HCPCS | Mod: NTX | Performed by: STUDENT IN AN ORGANIZED HEALTH CARE EDUCATION/TRAINING PROGRAM

## 2018-05-10 PROCEDURE — 20600001 HC STEP DOWN PRIVATE ROOM: Mod: NTX

## 2018-05-10 PROCEDURE — P9016 RBC LEUKOCYTES REDUCED: HCPCS | Mod: NTX

## 2018-05-10 PROCEDURE — 83010 ASSAY OF HAPTOGLOBIN QUANT: CPT | Mod: NTX

## 2018-05-10 PROCEDURE — 82248 BILIRUBIN DIRECT: CPT | Mod: NTX

## 2018-05-10 PROCEDURE — 99233 SBSQ HOSP IP/OBS HIGH 50: CPT | Mod: NTX,,, | Performed by: NURSE PRACTITIONER

## 2018-05-10 PROCEDURE — 25000003 PHARM REV CODE 250: Mod: NTX | Performed by: NURSE PRACTITIONER

## 2018-05-10 PROCEDURE — 82306 VITAMIN D 25 HYDROXY: CPT | Mod: NTX

## 2018-05-10 PROCEDURE — 83970 ASSAY OF PARATHORMONE: CPT | Mod: NTX

## 2018-05-10 PROCEDURE — 85384 FIBRINOGEN ACTIVITY: CPT | Mod: NTX

## 2018-05-10 PROCEDURE — 99232 SBSQ HOSP IP/OBS MODERATE 35: CPT | Mod: NTX,,, | Performed by: INTERNAL MEDICINE

## 2018-05-10 PROCEDURE — 84100 ASSAY OF PHOSPHORUS: CPT | Mod: NTX

## 2018-05-10 PROCEDURE — 88185 FLOWCYTOMETRY/TC ADD-ON: CPT | Mod: 91,NTX

## 2018-05-10 PROCEDURE — 83615 LACTATE (LD) (LDH) ENZYME: CPT | Mod: NTX

## 2018-05-10 PROCEDURE — 25000003 PHARM REV CODE 250: Mod: NTX | Performed by: STUDENT IN AN ORGANIZED HEALTH CARE EDUCATION/TRAINING PROGRAM

## 2018-05-10 PROCEDURE — 36415 COLL VENOUS BLD VENIPUNCTURE: CPT | Mod: NTX

## 2018-05-10 PROCEDURE — 85610 PROTHROMBIN TIME: CPT | Mod: NTX

## 2018-05-10 PROCEDURE — 87799 DETECT AGENT NOS DNA QUANT: CPT | Mod: NTX

## 2018-05-10 PROCEDURE — 80158 DRUG ASSAY CYCLOSPORINE: CPT | Mod: NTX

## 2018-05-10 PROCEDURE — 82728 ASSAY OF FERRITIN: CPT | Mod: NTX

## 2018-05-10 PROCEDURE — 63600175 PHARM REV CODE 636 W HCPCS: Mod: NTX | Performed by: NURSE PRACTITIONER

## 2018-05-10 PROCEDURE — 85045 AUTOMATED RETICULOCYTE COUNT: CPT | Mod: NTX

## 2018-05-10 PROCEDURE — 88188 FLOWCYTOMETRY/READ 9-15: CPT | Mod: NTX

## 2018-05-10 PROCEDURE — 80053 COMPREHEN METABOLIC PANEL: CPT | Mod: NTX

## 2018-05-10 PROCEDURE — 86157 COLD AGGLUTININ TITER: CPT | Mod: NTX

## 2018-05-10 PROCEDURE — 83540 ASSAY OF IRON: CPT | Mod: NTX

## 2018-05-10 RX ORDER — SODIUM CHLORIDE 9 MG/ML
INJECTION, SOLUTION INTRAVENOUS ONCE
Status: COMPLETED | OUTPATIENT
Start: 2018-05-10 | End: 2018-05-11

## 2018-05-10 RX ORDER — SODIUM CHLORIDE 9 MG/ML
INJECTION, SOLUTION INTRAVENOUS
Status: DISCONTINUED | OUTPATIENT
Start: 2018-05-10 | End: 2018-05-19

## 2018-05-10 RX ORDER — PROCHLORPERAZINE EDISYLATE 5 MG/ML
5 INJECTION INTRAMUSCULAR; INTRAVENOUS EVERY 4 HOURS PRN
Status: DISCONTINUED | OUTPATIENT
Start: 2018-05-10 | End: 2018-05-23 | Stop reason: HOSPADM

## 2018-05-10 RX ORDER — HYDROCODONE BITARTRATE AND ACETAMINOPHEN 500; 5 MG/1; MG/1
TABLET ORAL
Status: DISCONTINUED | OUTPATIENT
Start: 2018-05-10 | End: 2018-05-13

## 2018-05-10 RX ORDER — ERGOCALCIFEROL 1.25 MG/1
50000 CAPSULE ORAL
Status: DISCONTINUED | OUTPATIENT
Start: 2018-05-10 | End: 2018-05-23 | Stop reason: HOSPADM

## 2018-05-10 RX ADMIN — SEVELAMER CARBONATE 800 MG: 800 TABLET, FILM COATED ORAL at 08:05

## 2018-05-10 RX ADMIN — BISACODYL 10 MG: 5 TABLET, COATED ORAL at 08:05

## 2018-05-10 RX ADMIN — LEVETIRACETAM 500 MG: 500 TABLET ORAL at 09:05

## 2018-05-10 RX ADMIN — METRONIDAZOLE 500 MG: 500 INJECTION, SOLUTION INTRAVENOUS at 06:05

## 2018-05-10 RX ADMIN — CYCLOSPORINE 100 MG: 100 CAPSULE, LIQUID FILLED ORAL at 09:05

## 2018-05-10 RX ADMIN — METOCLOPRAMIDE HYDROCHLORIDE 10 MG: 5 TABLET ORAL at 06:05

## 2018-05-10 RX ADMIN — DRONABINOL 2.5 MG: 2.5 CAPSULE ORAL at 09:05

## 2018-05-10 RX ADMIN — OXYCODONE HYDROCHLORIDE 10 MG: 5 TABLET ORAL at 03:05

## 2018-05-10 RX ADMIN — URSODIOL 300 MG: 300 CAPSULE ORAL at 08:05

## 2018-05-10 RX ADMIN — OXYCODONE HYDROCHLORIDE 10 MG: 5 TABLET ORAL at 05:05

## 2018-05-10 RX ADMIN — METOCLOPRAMIDE HYDROCHLORIDE 10 MG: 5 TABLET ORAL at 09:05

## 2018-05-10 RX ADMIN — URSODIOL 300 MG: 300 CAPSULE ORAL at 09:05

## 2018-05-10 RX ADMIN — LEVETIRACETAM 500 MG: 500 TABLET ORAL at 08:05

## 2018-05-10 RX ADMIN — SEVELAMER CARBONATE 800 MG: 800 TABLET, FILM COATED ORAL at 12:05

## 2018-05-10 RX ADMIN — OXYCODONE HYDROCHLORIDE 10 MG: 5 TABLET ORAL at 09:05

## 2018-05-10 RX ADMIN — ERGOCALCIFEROL 50000 UNITS: 1.25 CAPSULE ORAL at 10:05

## 2018-05-10 RX ADMIN — PROCHLORPERAZINE EDISYLATE 5 MG: 5 INJECTION INTRAMUSCULAR; INTRAVENOUS at 05:05

## 2018-05-10 RX ADMIN — CEFTRIAXONE SODIUM 2 G: 2 INJECTION, POWDER, FOR SOLUTION INTRAMUSCULAR; INTRAVENOUS at 02:05

## 2018-05-10 RX ADMIN — LEVOTHYROXINE SODIUM 75 MCG: 25 TABLET ORAL at 06:05

## 2018-05-10 RX ADMIN — CYCLOSPORINE 100 MG: 100 CAPSULE, LIQUID FILLED ORAL at 08:05

## 2018-05-10 RX ADMIN — PANTOPRAZOLE SODIUM 40 MG: 40 TABLET, DELAYED RELEASE ORAL at 08:05

## 2018-05-10 NOTE — SUBJECTIVE & OBJECTIVE
Scheduled Meds:   bisacodyl  10 mg Oral Daily    cycloSPORINE modified (NEORAL)  100 mg Oral BID    dronabinol  2.5 mg Oral QHS    [START ON 5/11/2018] epoetin maurisio (PROCRIT) injection  20,000 Units Intravenous Every Mon, Fri    ergocalciferol  50,000 Units Oral Q7 Days    levETIRAcetam  500 mg Oral BID    levothyroxine  75 mcg Oral Before breakfast    metoclopramide HCl  10 mg Oral QID (AC & HS)    pantoprazole  40 mg Oral Daily    sevelamer carbonate  800 mg Oral TID WM    ursodiol  300 mg Oral BID     Continuous Infusions:  PRN Meds:sodium chloride, sodium chloride, oxyCODONE, prochlorperazine, promethazine, ramelteon, sodium chloride 0.9%    Review of Systems   Constitutional: Positive for activity change, appetite change and fever. Negative for fatigue.   HENT: Negative for ear pain, hearing loss and sore throat.    Eyes: Negative for photophobia, discharge and visual disturbance.   Respiratory: Negative for cough, chest tightness, shortness of breath and wheezing.    Cardiovascular: Negative for chest pain, palpitations and leg swelling.   Gastrointestinal: Positive for abdominal pain and nausea. Negative for abdominal distention, blood in stool, constipation, diarrhea and vomiting.   Genitourinary: Negative for difficulty urinating, dysuria, flank pain, frequency, hematuria and urgency.   Musculoskeletal: Negative for arthralgias, back pain, gait problem and neck pain.   Skin: Negative for pallor and rash.   Allergic/Immunologic: Positive for immunocompromised state.   Neurological: Positive for weakness. Negative for dizziness, tremors, syncope, numbness and headaches.   Hematological: Does not bruise/bleed easily.   Psychiatric/Behavioral: Negative for confusion and hallucinations. The patient is not nervous/anxious.      Scheduled Meds:   bisacodyl  10 mg Oral Daily    cycloSPORINE modified (NEORAL)  100 mg Oral BID    dronabinol  2.5 mg Oral QHS    [START ON 5/11/2018] epoetin maurisio  (PROCRIT) injection  20,000 Units Intravenous Every Mon, Fri    ergocalciferol  50,000 Units Oral Q7 Days    levETIRAcetam  500 mg Oral BID    levothyroxine  75 mcg Oral Before breakfast    metoclopramide HCl  10 mg Oral QID (AC & HS)    pantoprazole  40 mg Oral Daily    sevelamer carbonate  800 mg Oral TID WM    ursodiol  300 mg Oral BID     Continuous Infusions:  PRN Meds:sodium chloride, sodium chloride, oxyCODONE, prochlorperazine, promethazine, ramelteon, sodium chloride 0.9%    Review of Systems   Constitutional: Positive for activity change, appetite change and fever. Negative for fatigue.   HENT: Negative for ear pain, hearing loss and sore throat.    Eyes: Negative for photophobia, discharge and visual disturbance.   Respiratory: Negative for cough, chest tightness, shortness of breath and wheezing.    Cardiovascular: Negative for chest pain, palpitations and leg swelling.   Gastrointestinal: Positive for abdominal pain and nausea. Negative for abdominal distention, blood in stool, constipation, diarrhea and vomiting.   Genitourinary: Negative for difficulty urinating, dysuria, flank pain, frequency, hematuria and urgency.   Musculoskeletal: Negative for arthralgias, back pain, gait problem and neck pain.   Skin: Negative for pallor and rash.   Allergic/Immunologic: Positive for immunocompromised state.   Neurological: Positive for weakness. Negative for dizziness, tremors, syncope, numbness and headaches.   Hematological: Does not bruise/bleed easily.   Psychiatric/Behavioral: Negative for confusion and hallucinations. The patient is not nervous/anxious.      Objective:     Vital Signs (Most Recent):  Temp: 98.5 °F (36.9 °C) (05/10/18 1538)  Pulse: 75 (05/10/18 1538)  Resp: 16 (05/10/18 1538)  BP: (!) 150/94 (05/10/18 1538)  SpO2: 96 % (05/10/18 1538) Vital Signs (24h Range):  Temp:  [98.2 °F (36.8 °C)-99.3 °F (37.4 °C)] 98.5 °F (36.9 °C)  Pulse:  [74-99] 75  Resp:  [16-20] 16  SpO2:  [95 %-100 %]  96 %  BP: (139-156)/(85-98) 150/94     Weight: 59.1 kg (130 lb 4.7 oz)  Body mass index is 21.03 kg/m².    Intake/Output - Last 3 Shifts       05/08 0700 - 05/09 0659 05/09 0700 - 05/10 0659 05/10 0700 - 05/11 0659    P.O.  600     I.V. (mL/kg) 270.8 (4.4) 50 (0.8)     IV Piggyback 150 250     Total Intake(mL/kg) 420.8 (6.9) 900 (15.2)     Urine (mL/kg/hr) 100 475 (0.3)     Total Output 100 475      Net +320.8 +425             Urine Occurrence  1 x     Stool Occurrence 0 x 0 x           Physical Exam    Laboratory:  Immunosuppressants         Stop Route Frequency     cycloSPORINE modified (NEORAL) capsule 100 mg      -- Oral 2 times daily        CBC:     Recent Labs  Lab 05/10/18  0940   WBC 3.41*   RBC 2.19*   HGB 6.7*   HCT 20.5*   PLT 25*   MCV 94   MCH 30.6   MCHC 32.7     CMP:     Recent Labs  Lab 05/10/18  0504   GLU 80   CALCIUM 8.3*   ALBUMIN 2.1*   PROT 5.4*   *   K 3.9   CO2 24      BUN 33*   CREATININE 4.7*   ALKPHOS 129   ALT <5*   AST 20   BILITOT 2.1*     Coagulation:     Recent Labs  Lab 05/10/18  0504   INR 1.0     Labs within the past 24 hours have been reviewed.    Diagnostic Results:  US Liver Transplant Post:    Satisfactory doppler evaluation of transplanted liver.    Grossly stable complex fluid collection superior to the right hepatic lobe.    Moderate volume of ascites.    Common duct stent is in place with persistent pneumobilia.  Objective:     Vital Signs (Most Recent):  Temp: 98.5 °F (36.9 °C) (05/10/18 1538)  Pulse: 75 (05/10/18 1538)  Resp: 16 (05/10/18 1538)  BP: (!) 150/94 (05/10/18 1538)  SpO2: 96 % (05/10/18 1538) Vital Signs (24h Range):  Temp:  [98.2 °F (36.8 °C)-99.3 °F (37.4 °C)] 98.5 °F (36.9 °C)  Pulse:  [74-99] 75  Resp:  [16-20] 16  SpO2:  [95 %-100 %] 96 %  BP: (139-156)/(85-98) 150/94     Weight: 59.1 kg (130 lb 4.7 oz)  Body mass index is 21.03 kg/m².    Intake/Output - Last 3 Shifts       05/08 0700 - 05/09 0659 05/09 0700 - 05/10 0659 05/10 0700 - 05/11 0659     P.O.  600     I.V. (mL/kg) 270.8 (4.4) 50 (0.8)     IV Piggyback 150 250     Total Intake(mL/kg) 420.8 (6.9) 900 (15.2)     Urine (mL/kg/hr) 100 475 (0.3)     Total Output 100 475      Net +320.8 +425             Urine Occurrence  1 x     Stool Occurrence 0 x 0 x           Physical Exam   Constitutional: He is oriented to person, place, and time. He appears well-developed.   Hand and temporal muscle wasting   HENT:   Head: Normocephalic and atraumatic.   Eyes: Scleral icterus is present.   Neck: Normal range of motion.   Cardiovascular: Normal rate, regular rhythm, normal heart sounds and intact distal pulses.    Pulmonary/Chest: Effort normal and breath sounds normal.   Abdominal: Soft. Bowel sounds are normal.   Musculoskeletal: He exhibits no edema.   Neurological: He is alert and oriented to person, place, and time.   Skin: Skin is warm and dry.   jaundice   Psychiatric: He has a normal mood and affect. His behavior is normal. Judgment and thought content normal.   Nursing note and vitals reviewed.      Laboratory:  Immunosuppressants         Stop Route Frequency     cycloSPORINE modified (NEORAL) capsule 100 mg      -- Oral 2 times daily        CBC:     Recent Labs  Lab 05/10/18  0940   WBC 3.41*   RBC 2.19*   HGB 6.7*   HCT 20.5*   PLT 25*   MCV 94   MCH 30.6   MCHC 32.7     CMP:     Recent Labs  Lab 05/10/18  0504   GLU 80   CALCIUM 8.3*   ALBUMIN 2.1*   PROT 5.4*   *   K 3.9   CO2 24      BUN 33*   CREATININE 4.7*   ALKPHOS 129   ALT <5*   AST 20   BILITOT 2.1*     Coagulation:     Recent Labs  Lab 05/10/18  0504   INR 1.0     Labs within the past 24 hours have been reviewed.    Diagnostic Results:  US Liver Transplant Post:    Satisfactory doppler evaluation of transplanted liver.    Grossly stable complex fluid collection superior to the right hepatic lobe.    Moderate volume of ascites.    Common duct stent is in place with persistent pneumobilia.

## 2018-05-10 NOTE — ASSESSMENT & PLAN NOTE
- h/h chronically low  - continue epoetin  - hemolysis labs concerning - hematology consulted & following

## 2018-05-10 NOTE — PLAN OF CARE
Problem: Patient Care Overview  Goal: Plan of Care Review  Outcome: Ongoing (interventions implemented as appropriate)  Pt's VSS, AAOx4, and independent. H/H levels were low and 2 units RBC transfused and tolerated. Managed pain with Percocet 10mg PO. Managed nausea with Compazine. Telemetry monitoring in place. Pt will receive dialysis tomorrow. Pt's mother at bedside. Pt's bed in lowest position, call bell within reach, and nonskid socks on.

## 2018-05-10 NOTE — ASSESSMENT & PLAN NOTE
- admitted with low grade fevers  - infectious work up negative thus far  - empiric antibiotics initiated, and now discontinued

## 2018-05-10 NOTE — PROGRESS NOTES
[facility]  Hematology/Oncology  Progress Note    Patient Name: Jhonny Diana  Admission Date: 5/8/2018  Hospital Length of Stay: 1 days  Code Status: Full Code     Subjective:     Interval History:     - remains afebrile.   - resting comfortably in bed.   - chronic abdominal pain, controlled with current regimen.   - according to mother, noted to have ?dark mucous substance noted with last bowel movements.     Medications:  Continuous Infusions:  Scheduled Meds:   bisacodyl  10 mg Oral Daily    cefTRIAXone (ROCEPHIN) IVPB  2 g Intravenous Q24H    cycloSPORINE modified (NEORAL)  100 mg Oral BID    dronabinol  2.5 mg Oral QHS    [START ON 5/11/2018] epoetin maurisio (PROCRIT) injection  20,000 Units Intravenous Every Mon, Fri    levETIRAcetam  500 mg Oral BID    levothyroxine  75 mcg Oral Before breakfast    metoclopramide HCl  10 mg Oral QID (AC & HS)    metronidazole  500 mg Intravenous Q8H    pantoprazole  40 mg Oral Daily    sevelamer carbonate  800 mg Oral TID WM    ursodiol  300 mg Oral BID     PRN Meds:sodium chloride, ondansetron, oxyCODONE, promethazine, ramelteon, sodium chloride 0.9%     Review of Systems  Objective:     Vital Signs (Most Recent):  Temp: 98.9 °F (37.2 °C) (05/10/18 0333)  Pulse: 87 (05/10/18 0333)  Resp: 20 (05/10/18 0333)  BP: (!) 141/87 (05/10/18 0333)  SpO2: 96 % (05/10/18 0333) Vital Signs (24h Range):  Temp:  [98.6 °F (37 °C)-99.3 °F (37.4 °C)] 98.9 °F (37.2 °C)  Pulse:  [81-95] 87  Resp:  [18-20] 20  SpO2:  [94 %-98 %] 96 %  BP: (141-159)/(87-96) 141/87     Weight: 59.1 kg (130 lb 4.7 oz)  Body mass index is 21.03 kg/m².  Body surface area is 1.66 meters squared.      Intake/Output Summary (Last 24 hours) at 05/10/18 0641  Last data filed at 05/10/18 0600   Gross per 24 hour   Intake              900 ml   Output              475 ml   Net              425 ml       Physical Exam  Constitutional: He is oriented to person, place, and time. He appears  well-developed.   HENT:   Head: Normocephalic and atraumatic.   Eyes: EOM are normal. Right eye exhibits no discharge. Left eye exhibits no discharge. Scleral icterus is present.   Neck: Normal range of motion.   Cardiovascular: Normal rate, regular rhythm, normal heart sounds and intact distal pulses.   Pulmonary/Chest: Effort normal and breath sounds normal. No respiratory distress. He has no wheezes. He has no rales.   Abdominal: Soft. Bowel sounds are normal. He exhibits no distension. There is tenderness. Old surgical scar.   Musculoskeletal: Normal range of motion. He exhibits no edema.   Neurological: He is alert and oriented to person, place, and time.   Skin: Skin is warm and dry. RIJ tunneled dialysis catheter   Psychiatric: He has a normal mood and affect. His behavior is normal. Judgment and thought content normal.     Significant Labs:   CBC:   Recent Labs  Lab 05/08/18 2259 05/09/18  0525 05/09/18  0957 05/10/18  0504   WBC 3.38* 2.63* 2.95* 2.76*   HGB 7.5* 5.8* 7.1* 5.1*   HCT 22.6* 17.2* 21.9* 16.0*   PLT 26* 18* 22*  --     and CMP:   Recent Labs  Lab 05/08/18 2259 05/09/18  0525 05/10/18  0504    138 135*   K 4.6 4.0 3.9   CL 99 102 101   CO2 27 27 24   GLU 80 69* 80   BUN 22* 25* 33*   CREATININE 3.6* 3.7* 4.7*   CALCIUM 9.0 8.3* 8.3*   PROT 6.9 5.4* 5.4*   ALBUMIN 2.4* 2.0* 2.1*   BILITOT 3.9* 2.9* 2.1*   ALKPHOS 151* 125 129   AST 36 21 20   ALT 6* <5* <5*   ANIONGAP 10 9 10   EGFRNONAA 21.7* 21.0* 15.7*       Diagnostic Results:  I have reviewed all pertinent imaging results/findings within the past 24 hours.    Assessment/Plan:     Active Diagnoses:    Diagnosis Date Noted POA    PRINCIPAL PROBLEM:  Fever [R50.9] 05/09/2018 Yes    Chronic kidney disease-mineral and bone disorder [N18.9, E83.9, M89.9] 05/09/2018 Yes    Anemia of chronic renal failure [N18.9, D63.1]  Yes    Hyperphosphatemia [E83.39] 04/04/2018 Yes    ESRD Monday/Friday [N18.6] 01/13/2018 Yes    Severe  protein-calorie malnutrition [E43] 01/11/2018 Yes    Biliary stricture of transplanted liver [T86.49, K83.1] 12/07/2017 Yes    Hypomagnesemia [E83.42] 11/18/2017 Yes    S/P liver transplant [Z94.4] 10/20/2017 Not Applicable     Chronic    Prophylactic immunotherapy [Z29.8] 10/20/2017 Not Applicable     Chronic    Long-term use of immunosuppressant medication [Z79.899] 10/20/2017 Not Applicable     Chronic      Problems Resolved During this Admission:    Diagnosis Date Noted Date Resolved POA    Elevated LFTs [R79.89] 04/30/2018 05/09/2018 Yes     Pancytopenia  Anemia of Chronic Renal Insufficiency  ESRD on iHD  Hx of OLT     - noted to have Hb: 8.4 g/dL with MCV 92 on admission, with Platelet count of 22,000/mm3 and WBC: 3,150/mm3 and ANC: 1,400/mm3.     - upon chart review, during his recent admission he was also noted to be anemia and required blood transfusions. His baseline Hb ranges 8-9 g/dL and Plt count 50-60,000/mm3.    - hemolysis lab work are concerning given low haptoglobin and elevated LDH.  - however, his reticulocyte count is only slightly elevated and peripheral smear did not show any significant amount of schistocytes to suggest microangiopathic hemolysis or TMA/TTP.   - direct flora negative, less likely to be autoimmune hemolytic anemia.  - [id number] drawn and sent off to r/o TTP, although index of suspicion remains low.   - today his Hb: 5.1 g/dL from 7.1 g/dL  - however, reticulocyte count/LDH/total bilirubin are trending downward. Majority of total bilirubin is direct. Therefore, suspicion for hemolysis remains low, however, we have sent off PNH, [id number], and Cold Agg Titers to r/o any other potential culprits.    - fibrinogen/coags unremarkable, less likely to be a consumptive process.   - CMV/Parvo titers in process.   - check FOBT  - although his acute illness may have exacerbated his chronically low blood counts due to renal insufficiency and immunosuppression, will consider bone  marrow biopsy evaluation to r/o any underlying bone marrow disorder if we do not see count recovery.   - VIDA support per Nephrology.      Thank you for your consult. I will follow-up with patient. Please contact us if you have any additional questions.     Dereje Robles MD  Hematology/Oncology  [facility]      Attending Addendum:  The patient was seen, examined, and discussed on rounds with the team.  I agree with the assessment and plan as outlined for Jhonny Diana.      Antony Alvarado DO, FACP  Hematology & Oncology  [facility] [place] [address]  ph. [phone]  Fax. [phone]

## 2018-05-10 NOTE — PROGRESS NOTES
[facility]  Liver Transplant  Progress Note    Patient Name: Jhonny Diana  MRN: [id number]  Admission Date: 2018  Hospital Length of Stay: 1 days  Code Status: Full Code  Primary Care Provider: Primary Doctor No  Post-Operative Day: 203    ORGAN:   LIVER  Disease Etiology: Acute Alcoholic Hepatitis  Donor Type:    - Brain Death  CDC High Risk:   No  Donor CMV Status:   Donor CMV Status: Positive  Donor HBcAB:   Negative  Donor HCV Status:   Negative  Whole or Partial: Whole Liver  Biliary Anastomosis: End to End  Arterial Anatomy: Standard  Subjective:     History of Present Illness:  Mr. 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  S/p OLTX 10/19/2017 (Liver) with steroid induction. Post op course with multiple complications to include biliary stricture, FUO, ESRD, and chronic abdominal pain. Recently discharged on  after an ERCP. Reports that since discharge he has had fevers of 100.4 to 100.8. These fevers resolve on their own without medication. Also reports decreased appetite and fatigue. Nausea and abdominal pain are baseline and unchanged from prior to discharge. Normal bowel movements and passing gas. Receiving dialysis Monday and Friday. On admission, patient was afebrile and hemodynamically stable. Labs were stable from prior except tbili which is up to 3.9 from 3.2 yesterday.     Hospital Course:  Interval history: AES consulted upon admit for possible ERCP.  Per AES recommendation - no need for ERCP at this time as imaging shows stent in place.  Has been afebrile since admission - will discontinue empiric ceftriaxone/flagyl.  Anemic on am labs - transfuse 2 units prbc.  Hematology consulted and following.    Scheduled Meds:   bisacodyl  10 mg Oral Daily    cycloSPORINE modified (NEORAL)  100 mg Oral BID    dronabinol  2.5 mg Oral QHS    [START ON 2018] epoetin maurisio (PROCRIT) injection  20,000 Units Intravenous Every Mon, Fri    ergocalciferol   50,000 Units Oral Q7 Days    levETIRAcetam  500 mg Oral BID    levothyroxine  75 mcg Oral Before breakfast    metoclopramide HCl  10 mg Oral QID (AC & HS)    pantoprazole  40 mg Oral Daily    sevelamer carbonate  800 mg Oral TID WM    ursodiol  300 mg Oral BID     Continuous Infusions:  PRN Meds:sodium chloride, sodium chloride, oxyCODONE, prochlorperazine, promethazine, ramelteon, sodium chloride 0.9%    Review of Systems   Constitutional: Positive for activity change, appetite change and fever. Negative for fatigue.   HENT: Negative for ear pain, hearing loss and sore throat.    Eyes: Negative for photophobia, discharge and visual disturbance.   Respiratory: Negative for cough, chest tightness, shortness of breath and wheezing.    Cardiovascular: Negative for chest pain, palpitations and leg swelling.   Gastrointestinal: Positive for abdominal pain and nausea. Negative for abdominal distention, blood in stool, constipation, diarrhea and vomiting.   Genitourinary: Negative for difficulty urinating, dysuria, flank pain, frequency, hematuria and urgency.   Musculoskeletal: Negative for arthralgias, back pain, gait problem and neck pain.   Skin: Negative for pallor and rash.   Allergic/Immunologic: Positive for immunocompromised state.   Neurological: Positive for weakness. Negative for dizziness, tremors, syncope, numbness and headaches.   Hematological: Does not bruise/bleed easily.   Psychiatric/Behavioral: Negative for confusion and hallucinations. The patient is not nervous/anxious.      Scheduled Meds:   bisacodyl  10 mg Oral Daily    cycloSPORINE modified (NEORAL)  100 mg Oral BID    dronabinol  2.5 mg Oral QHS    [START ON 5/11/2018] epoetin maurisio (PROCRIT) injection  20,000 Units Intravenous Every Mon, Fri    ergocalciferol  50,000 Units Oral Q7 Days    levETIRAcetam  500 mg Oral BID    levothyroxine  75 mcg Oral Before breakfast    metoclopramide HCl  10 mg Oral QID (AC & HS)    pantoprazole   40 mg Oral Daily    sevelamer carbonate  800 mg Oral TID WM    ursodiol  300 mg Oral BID     Continuous Infusions:  PRN Meds:sodium chloride, sodium chloride, oxyCODONE, prochlorperazine, promethazine, ramelteon, sodium chloride 0.9%    Review of Systems   Constitutional: Positive for activity change, appetite change and fever. Negative for fatigue.   HENT: Negative for ear pain, hearing loss and sore throat.    Eyes: Negative for photophobia, discharge and visual disturbance.   Respiratory: Negative for cough, chest tightness, shortness of breath and wheezing.    Cardiovascular: Negative for chest pain, palpitations and leg swelling.   Gastrointestinal: Positive for abdominal pain and nausea. Negative for abdominal distention, blood in stool, constipation, diarrhea and vomiting.   Genitourinary: Negative for difficulty urinating, dysuria, flank pain, frequency, hematuria and urgency.   Musculoskeletal: Negative for arthralgias, back pain, gait problem and neck pain.   Skin: Negative for pallor and rash.   Allergic/Immunologic: Positive for immunocompromised state.   Neurological: Positive for weakness. Negative for dizziness, tremors, syncope, numbness and headaches.   Hematological: Does not bruise/bleed easily.   Psychiatric/Behavioral: Negative for confusion and hallucinations. The patient is not nervous/anxious.      Objective:     Vital Signs (Most Recent):  Temp: 98.5 °F (36.9 °C) (05/10/18 1538)  Pulse: 75 (05/10/18 1538)  Resp: 16 (05/10/18 1538)  BP: (!) 150/94 (05/10/18 1538)  SpO2: 96 % (05/10/18 1538) Vital Signs (24h Range):  Temp:  [98.2 °F (36.8 °C)-99.3 °F (37.4 °C)] 98.5 °F (36.9 °C)  Pulse:  [74-99] 75  Resp:  [16-20] 16  SpO2:  [95 %-100 %] 96 %  BP: (139-156)/(85-98) 150/94     Weight: 59.1 kg (130 lb 4.7 oz)  Body mass index is 21.03 kg/m².    Intake/Output - Last 3 Shifts       05/08 0700 - 05/09 0659 05/09 0700 - 05/10 0659 05/10 0700 - 05/11 0659    P.O.  600     I.V. (mL/kg) 270.8 (4.4)  50 (0.8)     IV Piggyback 150 250     Total Intake(mL/kg) 420.8 (6.9) 900 (15.2)     Urine (mL/kg/hr) 100 475 (0.3)     Total Output 100 475      Net +320.8 +425             Urine Occurrence  1 x     Stool Occurrence 0 x 0 x           Physical Exam    Laboratory:  Immunosuppressants         Stop Route Frequency     cycloSPORINE modified (NEORAL) capsule 100 mg      -- Oral 2 times daily        CBC:     Recent Labs  Lab 05/10/18  0940   WBC 3.41*   RBC 2.19*   HGB 6.7*   HCT 20.5*   PLT 25*   MCV 94   MCH 30.6   MCHC 32.7     CMP:     Recent Labs  Lab 05/10/18  0504   GLU 80   CALCIUM 8.3*   ALBUMIN 2.1*   PROT 5.4*   *   K 3.9   CO2 24      BUN 33*   CREATININE 4.7*   ALKPHOS 129   ALT <5*   AST 20   BILITOT 2.1*     Coagulation:     Recent Labs  Lab 05/10/18  0504   INR 1.0     Labs within the past 24 hours have been reviewed.    Diagnostic Results:  US Liver Transplant Post:    Satisfactory doppler evaluation of transplanted liver.    Grossly stable complex fluid collection superior to the right hepatic lobe.    Moderate volume of ascites.    Common duct stent is in place with persistent pneumobilia.  Objective:     Vital Signs (Most Recent):  Temp: 98.5 °F (36.9 °C) (05/10/18 1538)  Pulse: 75 (05/10/18 1538)  Resp: 16 (05/10/18 1538)  BP: (!) 150/94 (05/10/18 1538)  SpO2: 96 % (05/10/18 1538) Vital Signs (24h Range):  Temp:  [98.2 °F (36.8 °C)-99.3 °F (37.4 °C)] 98.5 °F (36.9 °C)  Pulse:  [74-99] 75  Resp:  [16-20] 16  SpO2:  [95 %-100 %] 96 %  BP: (139-156)/(85-98) 150/94     Weight: 59.1 kg (130 lb 4.7 oz)  Body mass index is 21.03 kg/m².    Intake/Output - Last 3 Shifts       05/08 0700 - 05/09 0659 05/09 0700 - 05/10 0659 05/10 0700 - 05/11 0659    P.O.  600     I.V. (mL/kg) 270.8 (4.4) 50 (0.8)     IV Piggyback 150 250     Total Intake(mL/kg) 420.8 (6.9) 900 (15.2)     Urine (mL/kg/hr) 100 475 (0.3)     Total Output 100 475      Net +320.8 +425             Urine Occurrence  1 x     Stool  Occurrence 0 x 0 x           Physical Exam   Constitutional: He is oriented to person, place, and time. He appears well-developed.   Hand and temporal muscle wasting   HENT:   Head: Normocephalic and atraumatic.   Eyes: Scleral icterus is present.   Neck: Normal range of motion.   Cardiovascular: Normal rate, regular rhythm, normal heart sounds and intact distal pulses.    Pulmonary/Chest: Effort normal and breath sounds normal.   Abdominal: Soft. Bowel sounds are normal.   Musculoskeletal: He exhibits no edema.   Neurological: He is alert and oriented to person, place, and time.   Skin: Skin is warm and dry.   jaundice   Psychiatric: He has a normal mood and affect. His behavior is normal. Judgment and thought content normal.   Nursing note and vitals reviewed.      Laboratory:  Immunosuppressants         Stop Route Frequency     cycloSPORINE modified (NEORAL) capsule 100 mg      -- Oral 2 times daily        CBC:     Recent Labs  Lab 05/10/18  0940   WBC 3.41*   RBC 2.19*   HGB 6.7*   HCT 20.5*   PLT 25*   MCV 94   MCH 30.6   MCHC 32.7     CMP:     Recent Labs  Lab 05/10/18  0504   GLU 80   CALCIUM 8.3*   ALBUMIN 2.1*   PROT 5.4*   *   K 3.9   CO2 24      BUN 33*   CREATININE 4.7*   ALKPHOS 129   ALT <5*   AST 20   BILITOT 2.1*     Coagulation:     Recent Labs  Lab 05/10/18  0504   INR 1.0     Labs within the past 24 hours have been reviewed.    Diagnostic Results:  US Liver Transplant Post:    Satisfactory doppler evaluation of transplanted liver.    Grossly stable complex fluid collection superior to the right hepatic lobe.    Moderate volume of ascites.    Common duct stent is in place with persistent pneumobilia.    Assessment/Plan:     * Fever    - admitted with low grade fevers  - infectious work up negative thus far  - empiric antibiotics initiated, and now discontinued          Severe protein-calorie malnutrition    - continue dronabinol for appetite stimulant  - po supplements        Anemia of  chronic renal failure    - h/h chronically low  - continue epoetin  - hemolysis labs concerning - hematology consulted & following          Biliary stricture of transplanted liver    - stent in place per imaging  - PBS consulted for possible ERCP - not recommending ERCP for now          S/P liver transplant    - LFTs stable  - liver u/s reviewed - unremarkable  - monitor          Long-term use of immunosuppressant medication              Prophylactic immunotherapy    - continue cyclosporin  - monitor cya levels daily and adjust for therapeutic dose          Hyperphosphatemia    - continue sevelamer carbonate  - monitor labs daily          Vitamin D deficiency              ESRD Monday/Friday    - nephrology following  - making some urine, however not clearing  - Monday/friday dialysis schedule          Hypomagnesemia    - iv replacement  - monitor labs daily            VTE Risk Mitigation         Ordered     IP VTE HIGH RISK PATIENT  Once      05/09/18 0044          The patients clinical status was discussed at multidisplinary rounds, involving transplant surgery, transplant medicine, pharmacy, nursing, nutrition, and social work    Discharge Planning:  Monitor kidney function --> may need outpt HD  Monitor HH needs vs rehab for deconditioned status      Seda Camacho, NP  Liver Transplant  [facility]

## 2018-05-11 PROBLEM — R50.9 FEVER: Status: RESOLVED | Noted: 2018-05-09 | Resolved: 2018-05-11

## 2018-05-11 LAB
ALBUMIN SERPL BCP-MCNC: 2.2 G/DL
ALP SERPL-CCNC: 143 U/L
ALT SERPL W/O P-5'-P-CCNC: 5 U/L
ANION GAP SERPL CALC-SCNC: 9 MMOL/L
AST SERPL-CCNC: 21 U/L
BASOPHILS # BLD AUTO: 0.02 K/UL
BASOPHILS NFR BLD: 0.5 %
BILIRUB SERPL-MCNC: 2.1 MG/DL
BUN SERPL-MCNC: 39 MG/DL
CA TITR SERPL: NORMAL TITER
CALCIUM SERPL-MCNC: 8.4 MG/DL
CHLORIDE SERPL-SCNC: 101 MMOL/L
CO2 SERPL-SCNC: 25 MMOL/L
CREAT SERPL-MCNC: 5 MG/DL
CYCLOSPORINE BLD LC/MS/MS-MCNC: 200 NG/ML
DIFFERENTIAL METHOD: ABNORMAL
EOSINOPHIL # BLD AUTO: 0.1 K/UL
EOSINOPHIL NFR BLD: 1.6 %
ERYTHROCYTE [DISTWIDTH] IN BLOOD BY AUTOMATED COUNT: 14.7 %
EST. GFR  (AFRICAN AMERICAN): 16.8 ML/MIN/1.73 M^2
EST. GFR  (NON AFRICAN AMERICAN): 14.6 ML/MIN/1.73 M^2
GLUCOSE SERPL-MCNC: 84 MG/DL
HCT VFR BLD AUTO: 23.6 %
HGB BLD-MCNC: 7.9 G/DL
IMM GRANULOCYTES # BLD AUTO: 0.02 K/UL
IMM GRANULOCYTES NFR BLD AUTO: 0.5 %
INR PPP: 1
LYMPHOCYTES # BLD AUTO: 0.9 K/UL
LYMPHOCYTES NFR BLD: 24.3 %
MCH RBC QN AUTO: 30.4 PG
MCHC RBC AUTO-ENTMCNC: 33.5 G/DL
MCV RBC AUTO: 91 FL
MONOCYTES # BLD AUTO: 0.4 K/UL
MONOCYTES NFR BLD: 9.6 %
NEUTROPHILS # BLD AUTO: 2.4 K/UL
NEUTROPHILS NFR BLD: 63.5 %
NRBC BLD-RTO: 0 /100 WBC
PHOSPHATE SERPL-MCNC: 4.2 MG/DL
PLATELET # BLD AUTO: 21 K/UL
PLATELET BLD QL SMEAR: ABNORMAL
PMV BLD AUTO: ABNORMAL FL
POTASSIUM SERPL-SCNC: 4.1 MMOL/L
PROT SERPL-MCNC: 5.9 G/DL
PROTHROMBIN TIME: 10.3 SEC
RBC # BLD AUTO: 2.6 M/UL
RETICS/RBC NFR AUTO: 2.9 %
SODIUM SERPL-SCNC: 135 MMOL/L
WBC # BLD AUTO: 3.75 K/UL

## 2018-05-11 PROCEDURE — 85025 COMPLETE CBC W/AUTO DIFF WBC: CPT | Mod: NTX

## 2018-05-11 PROCEDURE — 85045 AUTOMATED RETICULOCYTE COUNT: CPT | Mod: NTX

## 2018-05-11 PROCEDURE — 25000003 PHARM REV CODE 250: Mod: NTX | Performed by: NURSE PRACTITIONER

## 2018-05-11 PROCEDURE — 25000003 PHARM REV CODE 250: Mod: NTX | Performed by: STUDENT IN AN ORGANIZED HEALTH CARE EDUCATION/TRAINING PROGRAM

## 2018-05-11 PROCEDURE — 80158 DRUG ASSAY CYCLOSPORINE: CPT | Mod: NTX

## 2018-05-11 PROCEDURE — 63600175 PHARM REV CODE 636 W HCPCS: Mod: NTX | Performed by: STUDENT IN AN ORGANIZED HEALTH CARE EDUCATION/TRAINING PROGRAM

## 2018-05-11 PROCEDURE — 99233 SBSQ HOSP IP/OBS HIGH 50: CPT | Mod: NTX,,, | Performed by: INTERNAL MEDICINE

## 2018-05-11 PROCEDURE — 20600001 HC STEP DOWN PRIVATE ROOM: Mod: NTX

## 2018-05-11 PROCEDURE — 36415 COLL VENOUS BLD VENIPUNCTURE: CPT | Mod: NTX

## 2018-05-11 PROCEDURE — 90935 HEMODIALYSIS ONE EVALUATION: CPT | Mod: NTX

## 2018-05-11 PROCEDURE — 90935 HEMODIALYSIS ONE EVALUATION: CPT | Mod: NTX,,, | Performed by: NURSE PRACTITIONER

## 2018-05-11 PROCEDURE — 84100 ASSAY OF PHOSPHORUS: CPT | Mod: NTX

## 2018-05-11 PROCEDURE — 63600175 PHARM REV CODE 636 W HCPCS: Mod: NTX | Performed by: NURSE PRACTITIONER

## 2018-05-11 PROCEDURE — 85610 PROTHROMBIN TIME: CPT | Mod: NTX

## 2018-05-11 PROCEDURE — 96372 THER/PROPH/DIAG INJ SC/IM: CPT | Mod: NTX

## 2018-05-11 PROCEDURE — 80053 COMPREHEN METABOLIC PANEL: CPT | Mod: NTX

## 2018-05-11 PROCEDURE — 99233 SBSQ HOSP IP/OBS HIGH 50: CPT | Mod: NTX,,, | Performed by: NURSE PRACTITIONER

## 2018-05-11 PROCEDURE — 25000003 PHARM REV CODE 250: Mod: NTX | Performed by: INTERNAL MEDICINE

## 2018-05-11 PROCEDURE — 83520 IMMUNOASSAY QUANT NOS NONAB: CPT | Mod: NTX

## 2018-05-11 RX ORDER — GENTAMICIN SULFATE 40 MG/ML
80 INJECTION, SOLUTION INTRAMUSCULAR; INTRAVENOUS
Status: DISCONTINUED | OUTPATIENT
Start: 2018-05-11 | End: 2018-05-23 | Stop reason: HOSPADM

## 2018-05-11 RX ORDER — HEPARIN SODIUM 5000 [USP'U]/ML
5000 INJECTION, SOLUTION INTRAVENOUS; SUBCUTANEOUS EVERY 12 HOURS
Status: DISCONTINUED | OUTPATIENT
Start: 2018-05-11 | End: 2018-05-12

## 2018-05-11 RX ADMIN — URSODIOL 300 MG: 300 CAPSULE ORAL at 08:05

## 2018-05-11 RX ADMIN — SEVELAMER CARBONATE 800 MG: 800 TABLET, FILM COATED ORAL at 05:05

## 2018-05-11 RX ADMIN — SEVELAMER CARBONATE 800 MG: 800 TABLET, FILM COATED ORAL at 06:05

## 2018-05-11 RX ADMIN — METOCLOPRAMIDE HYDROCHLORIDE 10 MG: 5 TABLET ORAL at 06:05

## 2018-05-11 RX ADMIN — METOCLOPRAMIDE HYDROCHLORIDE 10 MG: 5 TABLET ORAL at 11:05

## 2018-05-11 RX ADMIN — LEVETIRACETAM 500 MG: 500 TABLET ORAL at 08:05

## 2018-05-11 RX ADMIN — ERYTHROPOIETIN 20000 UNITS: 20000 INJECTION, SOLUTION INTRAVENOUS; SUBCUTANEOUS at 10:05

## 2018-05-11 RX ADMIN — LEVETIRACETAM 500 MG: 500 TABLET ORAL at 11:05

## 2018-05-11 RX ADMIN — SEVELAMER CARBONATE 800 MG: 800 TABLET, FILM COATED ORAL at 11:05

## 2018-05-11 RX ADMIN — BISACODYL 10 MG: 5 TABLET, COATED ORAL at 11:05

## 2018-05-11 RX ADMIN — CYCLOSPORINE 100 MG: 100 CAPSULE, LIQUID FILLED ORAL at 08:05

## 2018-05-11 RX ADMIN — OXYCODONE HYDROCHLORIDE 10 MG: 5 TABLET ORAL at 08:05

## 2018-05-11 RX ADMIN — METOCLOPRAMIDE HYDROCHLORIDE 10 MG: 5 TABLET ORAL at 05:05

## 2018-05-11 RX ADMIN — LEVOTHYROXINE SODIUM 75 MCG: 25 TABLET ORAL at 05:05

## 2018-05-11 RX ADMIN — CYCLOSPORINE 100 MG: 100 CAPSULE, LIQUID FILLED ORAL at 05:05

## 2018-05-11 RX ADMIN — OXYCODONE HYDROCHLORIDE 10 MG: 5 TABLET ORAL at 05:05

## 2018-05-11 RX ADMIN — SODIUM CHLORIDE 300 ML: 0.9 INJECTION, SOLUTION INTRAVENOUS at 07:05

## 2018-05-11 RX ADMIN — OXYCODONE HYDROCHLORIDE 10 MG: 5 TABLET ORAL at 04:05

## 2018-05-11 RX ADMIN — OXYCODONE HYDROCHLORIDE 10 MG: 5 TABLET ORAL at 11:05

## 2018-05-11 RX ADMIN — URSODIOL 300 MG: 300 CAPSULE ORAL at 05:05

## 2018-05-11 RX ADMIN — PANTOPRAZOLE SODIUM 40 MG: 40 TABLET, DELAYED RELEASE ORAL at 05:05

## 2018-05-11 RX ADMIN — METOCLOPRAMIDE HYDROCHLORIDE 10 MG: 5 TABLET ORAL at 08:05

## 2018-05-11 RX ADMIN — GENTAMICIN SULFATE 80 MG: 40 INJECTION, SOLUTION INTRAMUSCULAR; INTRAVENOUS at 10:05

## 2018-05-11 RX ADMIN — DRONABINOL 2.5 MG: 2.5 CAPSULE ORAL at 08:05

## 2018-05-11 NOTE — ASSESSMENT & PLAN NOTE
- nephrology following  - making some urine, however not clearing  - Monday/friday dialysis schedule  - HD 5/11 tolerated well

## 2018-05-11 NOTE — PLAN OF CARE
Problem: Hemodialysis (Adult)  Goal: Signs and Symptoms of Listed Potential Problems Will be Absent, Minimized or Managed (Hemodialysis)  Signs and symptoms of listed potential problems will be absent, minimized or managed by discharge/transition of care (reference Hemodialysis (Adult) CPG).   Outcome: Ongoing (interventions implemented as appropriate)  Patient was dialyzed for 3hrs. No complications noted. Catheter locked with Gentamicin 80mg/2ml. Still noted with Ascites. Report given to primary nurse.

## 2018-05-11 NOTE — PLAN OF CARE
Problem: Patient Care Overview  Goal: Plan of Care Review  Outcome: Ongoing (interventions implemented as appropriate)  Pt is AAOX4, independent, and VSS. Pt received HD today. Managed pain with Percocet 10mg PO. Pt's mother at bedside. Nonskid socks on. Pt refused heparin.

## 2018-05-11 NOTE — PROGRESS NOTES
[facility] NOTE     Patient currently on hemodialysis for removal of uremic toxins and volume.     Patient seen and evaluated on hemodialysis, tolerating treatment, see HD flowsheet for vitals and assessments.      Ultrafiltration goal is 2L     Labs have been reviewed and the dialysate bath has been adjusted.     Assessment/Plan:  Seen on dialysis this morning, resting quietly with no complaints.    Will continue M/F HD while in-patient.  BC NGTD, remains afebrile.      Anemia   Hem/Onc following, working up for hemolytic anemia  Continue VIDA with HD    BMM  Vit D low, continue Ergo  PTH WNL, no need for activated Vit D  Continue Sevelamer with meals.    LANE Beard, [facility]  Nephrology  Pager:  [phone]                I have reviewed and concur with the NP's history, physical, assessment, and plan. I have personally interviewed and examined the patient at bedside.

## 2018-05-11 NOTE — SUBJECTIVE & OBJECTIVE
Scheduled Meds:   bisacodyl  10 mg Oral Daily    cycloSPORINE modified (NEORAL)  100 mg Oral BID    dronabinol  2.5 mg Oral QHS    epoetin maurisio (PROCRIT) injection  20,000 Units Intravenous Every Mon, Fri    ergocalciferol  50,000 Units Oral Q7 Days    heparin (porcine)  5,000 Units Subcutaneous Q12H    levETIRAcetam  500 mg Oral BID    levothyroxine  75 mcg Oral Before breakfast    metoclopramide HCl  10 mg Oral QID (AC & HS)    pantoprazole  40 mg Oral Daily    sevelamer carbonate  800 mg Oral TID WM    ursodiol  300 mg Oral BID     Continuous Infusions:  PRN Meds:sodium chloride, sodium chloride, sodium chloride 0.9%, gentamicin, oxyCODONE, prochlorperazine, promethazine, ramelteon, sodium chloride 0.9%    Review of Systems   Constitutional: Positive for activity change and appetite change.   Respiratory: Negative for cough and shortness of breath.    Gastrointestinal: Positive for abdominal pain, constipation and nausea.   Allergic/Immunologic: Positive for immunocompromised state.   Neurological: Positive for weakness.     Objective:     Vital Signs (Most Recent):  Temp: 99.4 °F (37.4 °C) (05/11/18 1142)  Pulse: 86 (05/11/18 1142)  Resp: 18 (05/11/18 1142)  BP: 131/86 (05/11/18 1142)  SpO2: 99 % (05/11/18 1142) Vital Signs (24h Range):  Temp:  [98.3 °F (36.8 °C)-99.4 °F (37.4 °C)] 99.4 °F (37.4 °C)  Pulse:  [72-89] 86  Resp:  [16-20] 18  SpO2:  [95 %-99 %] 99 %  BP: (124-159)/() 131/86     Weight: 59.1 kg (130 lb 4.7 oz)  Body mass index is 21.03 kg/m².    Intake/Output - Last 3 Shifts       05/09 0700 - 05/10 0659 05/10 0700 - 05/11 0659 05/11 0700 - 05/12 0659    P.O. 600 420     I.V. (mL/kg) 50 (0.8)      Other   600    IV Piggyback 250      Total Intake(mL/kg) 900 (15.2) 420 (7.1) 600 (10.2)    Urine (mL/kg/hr) 475 (0.3) 150 (0.1)     Other   2600 (5.4)    Total Output     Net +425 +270 -2000           Urine Occurrence 1 x 1 x     Stool Occurrence 0 x            Physical Exam    Constitutional: He is oriented to person, place, and time. He appears well-developed.   Hand and temporal muscle wasting   HENT:   Head: Normocephalic and atraumatic.   Eyes: Pupils are equal, round, and reactive to light.   Neck: Normal range of motion.   Cardiovascular: Normal rate, regular rhythm, normal heart sounds and intact distal pulses.    Pulmonary/Chest: Effort normal and breath sounds normal.   Abdominal: Soft. Bowel sounds are normal.   Musculoskeletal: Normal range of motion. He exhibits no edema.   Neurological: He is alert and oriented to person, place, and time.   Skin: Skin is warm and dry. There is pallor.   Psychiatric: He has a normal mood and affect. His behavior is normal. Judgment and thought content normal.   Nursing note and vitals reviewed.      Laboratory:  Immunosuppressants         Stop Route Frequency     cycloSPORINE modified (NEORAL) capsule 100 mg      -- Oral 2 times daily        CBC:   Recent Labs  Lab 05/11/18  0570   WBC 3.75*   RBC 2.60*   HGB 7.9*   HCT 23.6*   PLT 21*   MCV 91   MCH 30.4   MCHC 33.5     CMP:   Recent Labs  Lab 05/11/18  0560   GLU 84   CALCIUM 8.4*   ALBUMIN 2.2*   PROT 5.9*   *   K 4.1   CO2 25      BUN 39*   CREATININE 5.0*   ALKPHOS 143*   ALT 5*   AST 21   BILITOT 2.1*     Coagulation:   Recent Labs  Lab 05/11/18  0563   INR 1.0     Labs within the past 24 hours have been reviewed.    Diagnostic Results:  None

## 2018-05-11 NOTE — DISCHARGE SUMMARY
[facility]  Liver Transplant  Discharge Summary      Patient Name: Jhonny Diana  MRN: [id number]  Admission Date: 2018  Hospital Length of Stay: 14 days  Discharge Date and Time:  2018 4:08 PM  Attending Physician: Nathanael Medina MD  Discharging Provider: Mandy Crespo NP  Primary Care Provider: Primary Doctor No  Post-Operative Day: 204     ORGAN:   LIVER  Disease Etiology: Acute Alcoholic Hepatitis  Donor Type:    - Brain Death  CDC High Risk:   No  Donor CMV Status:   Donor CMV Status: Positive  Donor HBcAB:   Negative  Donor HCV Status:   Negative  Whole or Partial: Whole Liver  Biliary Anastomosis: End to End  Arterial Anatomy: Standard    HPI: Mr. 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  S/p OLTX 10/19/2017 (Liver) with steroid induction. Post op course with multiple complications to include biliary stricture, FUO, ESRD, and chronic abdominal pain. Recently discharged on  after an ERCP. Reports that since discharge he has had fevers of 100.4 to 100.8. These fevers resolve on their own without medication. Also reports decreased appetite and fatigue. Nausea and abdominal pain are baseline and unchanged from prior to discharge. Normal bowel movements and passing gas. Receiving dialysis Monday and Friday. On admission, patient was afebrile and hemodynamically stable. Labs were stable from prior except tbili was up to 3.9 from 3.2. Now improving daily.    Hospital Course:    AES consulted upon admit for possible ERCP.  Per AES recommendation - no need for ERCP at this time as imaging shows stent in place.  Afebrile since admission, so discontinued empiric ceftriaxone/flagyl 5/10.  Has history of chronic anemia, however more profound this admission.  Hematology consulted for anemia work up.  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  Per hematology this  may be reactive to underlying organ dysfunction, drug and or peripheral destruction (specifically referring to platelets).  CT abd/pelv/chest 5/15 obtained to evaluate for lymph nodes, no lymphadenopathy seen. Hematology recommending steroids for possible ITP and considering lowering the dose of cyclosporine for possible TMA.  With following these recommendations platelets improved as did anemia.  Will continue steroids outpatient and taper down 10 mg weekly until reach 10 mg daily.  Will have follow up appointment with hematology on 5/29.    Severe malnutrition is a concern.  Already on appetite stimulant of marinol with little improvement.  However with the start of steroids, appetite is greatly improved.  Prealbumin 14 upon admission and now improved to 20 as of 5/22.    Mr. Diana is stable for discharge today. He has no complaints. LFTs stable. Platelet count improving daily. He will f/u with lab work on Friday 5/25. CMV PCR pending from 5/22, please f/u results. Next HD scheduled 5/25, continue Monday/Friday. Prescription written for pain medications until pt is seen by pain management. Pt encouraged to f/u with pain management center for continued treatment of chronic pain. Pt/caregiver verbalized understanding to all discharge instructions.         Final Active Diagnoses:    Diagnosis Date Noted POA    Biliary stricture of transplanted liver [T86.49, K83.1] 12/07/2017 Yes    S/P liver transplant [Z94.4] 10/20/2017 Not Applicable     Chronic    Long-term use of immunosuppressant medication [Z79.899] 10/20/2017 Not Applicable     Chronic    Prophylactic immunotherapy [Z29.8] 10/20/2017 Not Applicable     Chronic    At risk for opportunistic infections [Z91.89] 10/26/2017 Yes    Severe protein-calorie malnutrition [E43] 01/11/2018 Yes    Seizure [R56.9] 11/02/2017 Yes    ESRD Monday/Friday [N18.6] 01/13/2018 Yes    Other pancytopenia [D61.818] 05/12/2018 Yes    Thrombocytopenia [D69.6] 05/12/2018 Yes     Chronic kidney disease-mineral and bone disorder [N18.9, E83.9, M89.9] 05/09/2018 Yes    Anemia of chronic renal failure [N18.9, D63.1]  Yes    Constipation [K59.00] 01/20/2018 Yes      Problems Resolved During this Admission:    Diagnosis Date Noted Date Resolved POA    PRINCIPAL PROBLEM:  Fever [R50.9] 05/09/2018 05/11/2018 Yes    Hypomagnesemia [E83.42] 11/18/2017 05/18/2018 Yes    Bradycardia [R00.1] 05/18/2018 05/23/2018 Yes    Elevated LFTs [R79.89] 04/30/2018 05/09/2018 Yes    Hyperphosphatemia [E83.39] 04/04/2018 05/23/2018 Yes       Consults         Status Ordering Provider     Inpatient consult to [facility] ([facility]  Once     Provider:  (Not yet assigned)    Completed KEVIN GEE     Inpatient consult to Hematology  Once     Provider:  (Not yet assigned)    Completed VANDANA SAMUEL     Inpatient consult to Nephrology  Once     Provider:  (Not yet assigned)    Completed VANDANA SAMUEL          Pending Diagnostic Studies:     Procedure Component Value Units Date/Time    CMV DNA, quantitative, PCR [[id number]] Collected:  05/22/18 0427    Order Status:  Sent Lab Status:  In process Updated:  05/22/18 0528    Specimen:  Blood from Blood         Significant Diagnostic Studies: Labs:   CMP     Recent Labs  Lab 05/22/18 0427 05/23/18 0455   * 136   K 3.8 4.0   CL 98 102   CO2 23 21*   * 113*   BUN 31* 46*   CREATININE 2.4* 2.9*   CALCIUM 8.7 8.7   PROT 6.4 6.1   ALBUMIN 2.5* 2.5*   BILITOT 1.5* 1.5*   ALKPHOS 133 117   AST 14 13   ALT 6* 6*   ANIONGAP 10 13   ESTGFRAFRICA 40.9* 32.5*   EGFRNONAA 35.4* 28.1*    and CBC     Recent Labs  Lab 05/22/18 0427 05/23/18 0455   WBC 3.37* 4.46   HGB 9.1* 8.6*   HCT 27.7* 27.0*   PLT 25* 29*       The patients clinical status was discussed at multidisplinary rounds, involving transplant surgery, transplant medicine, pharmacy, nursing, nutrition, and social work    Discharged Condition: good    Disposition:  home    Follow Up: as above    Patient Instructions:     Diet Adult Regular     Activity as tolerated     Call MD for:  temperature >100.4     Call MD for:  persistent nausea and vomiting or diarrhea     Call MD for:  severe uncontrolled pain     Call MD for:  redness, tenderness, or signs of infection (pain, swelling, redness, odor or green/yellow discharge around incision site)     Call MD for:  difficulty breathing or increased cough     Call MD for:  severe persistent headache     Call MD for:  worsening rash     Call MD for:  persistent dizziness, light-headedness, or visual disturbances     Call MD for:  increased confusion or weakness     Call MD for:   Order Comments: Any unusual problems or concerns.       Medications:  Reconciled Home Medications:      Medication List      START taking these medications    amLODIPine 5 MG tablet  Commonly known as:  NORVASC  Take 1 tablet (5 mg total) by mouth once daily.     docusate sodium 100 MG capsule  Commonly known as:  COLACE  Take 1 capsule (100 mg total) by mouth 2 (two) times daily.     famotidine 20 MG tablet  Commonly known as:  PEPCID  Take 1 tablet (20 mg total) by mouth every evening.     fluconazole 200 MG Tab  Commonly known as:  DIFLUCAN  Take 1 tablet (200 mg total) by mouth once daily. STOP 6/23/18  Start taking on:  5/24/2018     gabapentin 100 MG capsule  Commonly known as:  NEURONTIN  Take 1 capsule (100 mg total) by mouth every evening.     predniSONE 20 MG tablet  Commonly known as:  DELTASONE  Take daily by mouth: 60mg (3 tab) 5/21-5/27, 50mg (2.5 tab) 5/28-6/3, 40mg (2 tab) 6/4-6/10, 30mg (1.5 tab) 6/11-6/17, 20mg (1 tab) 6/18-6/24, then 10mg (0.5 tab) daily beginning 6/25        CHANGE how you take these medications    * cycloSPORINE modified (NEORAL) 100 MG capsule  Commonly known as:  NEORAL  Take 1 capsule (100 mg total) by mouth 2 (two) times daily.  What changed:  Another medication with the same name was added. Make sure you understand  how and when to take each.     * cycloSPORINE modified (NEORAL) 25 MG capsule  Take 1 capsule (25 mg total) by mouth 2 (two) times daily. Total 125mg twice daily.  What changed:  You were already taking a medication with the same name, and this prescription was added. Make sure you understand how and when to take each.        * This list has 2 medication(s) that are the same as other medications prescribed for you. Read the directions carefully, and ask your doctor or other care provider to review them with you.            CONTINUE taking these medications    bisacodyl 5 mg EC tablet  Commonly known as:  DULCOLAX  Take 2 tablets (10 mg total) by mouth once daily.     dronabinol 2.5 MG capsule  Commonly known as:  MARINOL  Take 1 capsule (2.5 mg total) by mouth every evening.     ergocalciferol 50,000 unit Cap  Commonly known as:  ERGOCALCIFEROL  Take 1 capsule (50,000 Units total) by mouth every 7 days.     levETIRAcetam 500 MG Tab  Commonly known as:  KEPPRA  Take 1 tablet (500 mg total) by mouth 2 (two) times daily.     levothyroxine 75 MCG tablet  Commonly known as:  SYNTHROID  Take 1 tablet (75 mcg total) by mouth before breakfast.     metoclopramide HCl 10 MG tablet  Commonly known as:  REGLAN  Take 1 tablet (10 mg total) by mouth 4 (four) times daily before meals and nightly.     ondansetron 8 MG Tbdl  Commonly known as:  ZOFRAN-ODT  Take 1 tablet (8 mg total) by mouth every 8 (eight) hours as needed (nausea).     oxyCODONE 10 mg Tab immediate release tablet  Commonly known as:  ROXICODONE  Take 0.5-1 tablets (5-10 mg total) by mouth every 4 (four) hours as needed for Pain.     sevelamer carbonate 800 mg Tab  Commonly known as:  RENVELA  Take 1 tablet (800 mg total) by mouth 3 (three) times daily with meals.     ursodiol 300 mg capsule  Commonly known as:  ACTIGALL  Take 1 capsule (300 mg total) by mouth 2 (two) times daily.        STOP taking these medications    omeprazole 40 MG capsule  Commonly known as:   PRILOSEC          Time spent caring for patient (Greater than 1/2 spent in direct face-to-face contact): > 30 minutes    Mandy Crespo NP  Liver Transplant  [facility]

## 2018-05-11 NOTE — PROGRESS NOTES
[facility]  Hematology/Oncology  Progress Note    Patient Name: Jhonny Diana  Admission Date: 5/8/2018  Hospital Length of Stay: 2 days  Code Status: Full Code     Subjective:     Interval History:     - remains afebrile.   - received 2 units of PRBC yesterday.   - chronic abdominal pain, controlled with current regimen.   - received HD today.     Medications:  Continuous Infusions:  Scheduled Meds:   sodium chloride 0.9%   Intravenous Once    bisacodyl  10 mg Oral Daily    cycloSPORINE modified (NEORAL)  100 mg Oral BID    dronabinol  2.5 mg Oral QHS    epoetin maurisio (PROCRIT) injection  20,000 Units Intravenous Every Mon, Fri    ergocalciferol  50,000 Units Oral Q7 Days    levETIRAcetam  500 mg Oral BID    levothyroxine  75 mcg Oral Before breakfast    metoclopramide HCl  10 mg Oral QID (AC & HS)    pantoprazole  40 mg Oral Daily    sevelamer carbonate  800 mg Oral TID WM    ursodiol  300 mg Oral BID     PRN Meds:sodium chloride, sodium chloride, sodium chloride 0.9%, oxyCODONE, prochlorperazine, promethazine, ramelteon, sodium chloride 0.9%     Review of Systems  Objective:     Vital Signs (Most Recent):  Temp: 98.4 °F (36.9 °C) (05/11/18 0725)  Pulse: 74 (05/11/18 0735)  Resp: 20 (05/11/18 0326)  BP: (!) 137/99 (05/11/18 0735)  SpO2: 96 % (05/11/18 0326) Vital Signs (24h Range):  Temp:  [98.2 °F (36.8 °C)-99 °F (37.2 °C)] 98.4 °F (36.9 °C)  Pulse:  [72-99] 74  Resp:  [16-20] 20  SpO2:  [95 %-100 %] 96 %  BP: (131-159)/() 137/99     Weight: 59.1 kg (130 lb 4.7 oz)  Body mass index is 21.03 kg/m².  Body surface area is 1.66 meters squared.      Intake/Output Summary (Last 24 hours) at 05/11/18 0822  Last data filed at 05/11/18 0600   Gross per 24 hour   Intake              420 ml   Output              150 ml   Net              270 ml       Physical Exam  Constitutional: He is oriented to person, place, and time. He appears well-developed.   HENT:   Head: Normocephalic and  atraumatic.   Eyes: EOM are normal. Right eye exhibits no discharge. Left eye exhibits no discharge. Scleral icterus is present.   Neck: Normal range of motion.   Cardiovascular: Normal rate, regular rhythm, normal heart sounds and intact distal pulses.   Pulmonary/Chest: Effort normal and breath sounds normal. No respiratory distress. He has no wheezes. He has no rales.   Abdominal: Soft. Bowel sounds are normal. He exhibits no distension. There is tenderness. Old surgical scar.   Musculoskeletal: Normal range of motion. He exhibits no edema.   Neurological: He is alert and oriented to person, place, and time.   Skin: Skin is warm and dry. RIJ tunneled dialysis catheter   Psychiatric: He has a normal mood and affect. His behavior is normal. Judgment and thought content normal.     Significant Labs:   CBC:     Recent Labs  Lab 05/10/18  0504 05/10/18  0719 05/10/18  0940 05/11/18  0552   WBC 2.76* 2.91* 3.41* 3.75*   HGB 5.1* 5.9* 6.7* 7.9*   HCT 16.0* 18.2* 20.5* 23.6*   PLT 21* 21* 25*  --     and CMP:     Recent Labs  Lab 05/10/18  0504 05/11/18  0552   * 135*   K 3.9 4.1    101   CO2 24 25   GLU 80 84   BUN 33* 39*   CREATININE 4.7* 5.0*   CALCIUM 8.3* 8.4*   PROT 5.4* 5.9*   ALBUMIN 2.1* 2.2*   BILITOT 2.1* 2.1*   ALKPHOS 129 143*   AST 20 21   ALT <5* 5*   ANIONGAP 10 9   EGFRNONAA 15.7* 14.6*       Diagnostic Results:  I have reviewed all pertinent imaging results/findings within the past 24 hours.    Assessment/Plan:     Active Diagnoses:    Diagnosis Date Noted POA    PRINCIPAL PROBLEM:  Fever [R50.9] 05/09/2018 Yes    Chronic kidney disease-mineral and bone disorder [N18.9, E83.9, M89.9] 05/09/2018 Yes    Anemia of chronic renal failure [N18.9, D63.1]  Yes    Hyperphosphatemia [E83.39] 04/04/2018 Yes    ESRD Monday/Friday [N18.6] 01/13/2018 Yes    Severe protein-calorie malnutrition [E43] 01/11/2018 Yes    Biliary stricture of transplanted liver [T86.49, K83.1] 12/07/2017 Yes     Hypomagnesemia [E83.42] 11/18/2017 Yes    S/P liver transplant [Z94.4] 10/20/2017 Not Applicable     Chronic    Prophylactic immunotherapy [Z29.8] 10/20/2017 Not Applicable     Chronic    Long-term use of immunosuppressant medication [Z79.899] 10/20/2017 Not Applicable     Chronic      Problems Resolved During this Admission:    Diagnosis Date Noted Date Resolved POA    Elevated LFTs [R79.89] 04/30/2018 05/09/2018 Yes     Pancytopenia  Anemia of Chronic Renal Insufficiency  ESRD on iHD  Hx of OLT     - noted to have Hb: 8.4 g/dL with MCV 92 on admission, with Platelet count of 22,000/mm3 and WBC: 3,150/mm3 and ANC: 1,400/mm3.     - upon chart review, during his recent admission he was also noted to be anemia and required blood transfusions. His baseline Hb ranges 8-9 g/dL and Plt count 50-60,000/mm3.    - hemolysis lab work are concerning given low haptoglobin and elevated LDH.  - however, his reticulocyte count is only slightly elevated and peripheral smear did not show any significant amount of schistocytes to suggest microangiopathic hemolysis or TMA/TTP.   - direct flora negative, less likely to be autoimmune hemolytic anemia.  - [id number] resulted as 60%, which essentially rules out TTP.    - fibrinogen/coags unremarkable, less likely to be a consumptive process.   - today his Hb: 7.9 g/dL after receiving 2 units of PRBC yesterday and Platelets 21,000/mm3.   - however, reticulocyte count/LDH/total bilirubin are trending downward/stable. Majority of total bilirubin is direct. Therefore, suspicion for hemolysis remains low, however, we have sent off PNH and Cold Agg Titers to r/o any other potential culprits.    - CMV undetectable, Parvo titers in process.   - check FOBT  - although his acute illness may have exacerbated his chronically low blood counts due to renal insufficiency and immunosuppression, will consider bone marrow biopsy evaluation to r/o any underlying bone marrow disorder if we do not see  count recovery.   - VIDA support per Nephrology.  - if plan for discharge this weekend, then will schedule him for Hematology Clinic appt next week.       Thank you for your consult. I will follow-up with patient. Please contact us if you have any additional questions.     Dereje Robles MD  Hematology/Oncology  [facility]        I have reviewed the notes, assessments, and/or procedures performed by the housestaff, as above.  I have personally interviewed and examined the patient at the beside, and rounded with the housestaff. I concur with her/his assessment and plan and the documentation of Jhonny Diana.  I, Dr. Elliott Burdick, personally spent more than 35 mins during this encounter, greater than 50% was spent in direct counseling and/or coordination of care.     Elliott Burdick M.D., M.S., F.A.C.P.  Hematology/Oncology Attending  [facility]

## 2018-05-11 NOTE — PROGRESS NOTES
Received patient per stretcher, conscious and coherent, on room air. Noted with ascites but patient is negative of shortness of breath. Permcatheter on right IJ with good flow on both lumens. Hooked to HD aseptically set to 2L as tolerated x 3 hours. Will monitor accordingly

## 2018-05-11 NOTE — PROGRESS NOTES
[facility]  Liver Transplant  Progress Note    Patient Name: Jhonny Diana  MRN: [id number]  Admission Date: 2018  Hospital Length of Stay: 2 days  Code Status: Full Code  Primary Care Provider: Primary Doctor No  Post-Operative Day: 204    ORGAN:   LIVER  Disease Etiology: Acute Alcoholic Hepatitis  Donor Type:    - Brain Death  CDC High Risk:   No  Donor CMV Status:   Donor CMV Status: Positive  Donor HBcAB:   Negative  Donor HCV Status:   Negative  Whole or Partial: Whole Liver  Biliary Anastomosis: End to End  Arterial Anatomy: Standard  Subjective:     History of Present Illness:  Mr. 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  S/p OLTX 10/19/2017 (Liver) with steroid induction. Post op course with multiple complications to include biliary stricture, FUO, ESRD, and chronic abdominal pain. Recently discharged on  after an ERCP. Reports that since discharge he has had fevers of 100.4 to 100.8. These fevers resolve on their own without medication. Also reports decreased appetite and fatigue. Nausea and abdominal pain are baseline and unchanged from prior to discharge. Normal bowel movements and passing gas. Receiving dialysis Monday and Friday. On admission, patient was afebrile and hemodynamically stable. Labs were stable from prior except tbili which is up to 3.9 from 3.2 yesterday.     Hospital Course:  Interval history: AES consulted upon admit for possible ERCP.  Per AES recommendation - no need for ERCP at this time as imaging shows stent in place.  Has been afebrile since admission - discontinued empiric ceftriaxone/flagyl 5/10.  Anemic on am labs - transfuse 2 units prbc.  Hematology consulted and following.  Appetite improved.    Scheduled Meds:   bisacodyl  10 mg Oral Daily    cycloSPORINE modified (NEORAL)  100 mg Oral BID    dronabinol  2.5 mg Oral QHS    epoetin maurisio (PROCRIT) injection  20,000 Units Intravenous Every Mon, Fri    ergocalciferol   50,000 Units Oral Q7 Days    heparin (porcine)  5,000 Units Subcutaneous Q12H    levETIRAcetam  500 mg Oral BID    levothyroxine  75 mcg Oral Before breakfast    metoclopramide HCl  10 mg Oral QID (AC & HS)    pantoprazole  40 mg Oral Daily    sevelamer carbonate  800 mg Oral TID WM    ursodiol  300 mg Oral BID     Continuous Infusions:  PRN Meds:sodium chloride, sodium chloride, sodium chloride 0.9%, gentamicin, oxyCODONE, prochlorperazine, promethazine, ramelteon, sodium chloride 0.9%    Review of Systems   Constitutional: Positive for activity change and appetite change.   Respiratory: Negative for cough and shortness of breath.    Gastrointestinal: Positive for abdominal pain, constipation and nausea.   Allergic/Immunologic: Positive for immunocompromised state.   Neurological: Positive for weakness.     Objective:     Vital Signs (Most Recent):  Temp: 99.4 °F (37.4 °C) (05/11/18 1142)  Pulse: 86 (05/11/18 1142)  Resp: 18 (05/11/18 1142)  BP: 131/86 (05/11/18 1142)  SpO2: 99 % (05/11/18 1142) Vital Signs (24h Range):  Temp:  [98.3 °F (36.8 °C)-99.4 °F (37.4 °C)] 99.4 °F (37.4 °C)  Pulse:  [72-89] 86  Resp:  [16-20] 18  SpO2:  [95 %-99 %] 99 %  BP: (124-159)/() 131/86     Weight: 59.1 kg (130 lb 4.7 oz)  Body mass index is 21.03 kg/m².    Intake/Output - Last 3 Shifts       05/09 0700 - 05/10 0659 05/10 0700 - 05/11 0659 05/11 0700 - 05/12 0659    P.O. 600 420     I.V. (mL/kg) 50 (0.8)      Other   600    IV Piggyback 250      Total Intake(mL/kg) 900 (15.2) 420 (7.1) 600 (10.2)    Urine (mL/kg/hr) 475 (0.3) 150 (0.1)     Other   2600 (5.4)    Total Output     Net +425 +270 -2000           Urine Occurrence 1 x 1 x     Stool Occurrence 0 x            Physical Exam   Constitutional: He is oriented to person, place, and time. He appears well-developed.   Hand and temporal muscle wasting   HENT:   Head: Normocephalic and atraumatic.   Eyes: Pupils are equal, round, and reactive to light.    Neck: Normal range of motion.   Cardiovascular: Normal rate, regular rhythm, normal heart sounds and intact distal pulses.    Pulmonary/Chest: Effort normal and breath sounds normal.   Abdominal: Soft. Bowel sounds are normal.   Musculoskeletal: Normal range of motion. He exhibits no edema.   Neurological: He is alert and oriented to person, place, and time.   Skin: Skin is warm and dry. There is pallor.   Psychiatric: He has a normal mood and affect. His behavior is normal. Judgment and thought content normal.   Nursing note and vitals reviewed.      Laboratory:  Immunosuppressants         Stop Route Frequency     cycloSPORINE modified (NEORAL) capsule 100 mg      -- Oral 2 times daily        CBC:   Recent Labs  Lab 05/11/18 0552   WBC 3.75*   RBC 2.60*   HGB 7.9*   HCT 23.6*   PLT 21*   MCV 91   MCH 30.4   MCHC 33.5     CMP:   Recent Labs  Lab 05/11/18 0552   GLU 84   CALCIUM 8.4*   ALBUMIN 2.2*   PROT 5.9*   *   K 4.1   CO2 25      BUN 39*   CREATININE 5.0*   ALKPHOS 143*   ALT 5*   AST 21   BILITOT 2.1*     Coagulation:   Recent Labs  Lab 05/11/18  0552   INR 1.0     Labs within the past 24 hours have been reviewed.    Diagnostic Results:  None    Assessment/Plan:     Severe protein-calorie malnutrition    - continue dronabinol for appetite stimulant  - po supplements  - appetite improving, monitor        Anemia of chronic renal failure    - h/h chronically low  - continue epoetin  - hemolysis labs concerning - hematology consulted & following          Biliary stricture of transplanted liver    - stent in place per imaging  - PBS consulted for possible ERCP - not recommending ERCP for now          S/P liver transplant    - LFTs stable  - liver u/s reviewed - unremarkable  - monitor          Long-term use of immunosuppressant medication    See prophylactic immunosuppression        Prophylactic immunotherapy    - continue cyclosporin  - monitor cya levels daily and adjust for therapeutic dose           Hyperphosphatemia    - continue sevelamer carbonate  - monitor labs daily          Vitamin D deficiency    - continue ergo replacement          ESRD Monday/Friday    - nephrology following  - making some urine, however not clearing  - Monday/friday dialysis schedule  - HD 5/11 tolerated well          Hypomagnesemia    - iv replacement  - monitor labs daily            VTE Risk Mitigation         Ordered     heparin (porcine) injection 5,000 Units  Every 12 hours      05/11/18 1048     IP VTE HIGH RISK PATIENT  Once      05/09/18 0044          The patients clinical status was discussed at multidisplinary rounds, involving transplant surgery, transplant medicine, pharmacy, nursing, nutrition, and social work    Discharge Planning:  Monitor kidney function --> may need outpt HD  Monitor HH needs vs rehab for deconditioned status      Seda Camacho, NP  Liver Transplant  [facility]

## 2018-05-12 PROBLEM — D69.6 THROMBOCYTOPENIA: Status: ACTIVE | Noted: 2018-05-12

## 2018-05-12 PROBLEM — R17 ELEVATED BILIRUBIN: Status: ACTIVE | Noted: 2018-05-12

## 2018-05-12 PROBLEM — D61.818 OTHER PANCYTOPENIA: Status: ACTIVE | Noted: 2018-05-12

## 2018-05-12 LAB
ALBUMIN SERPL BCP-MCNC: 2.1 G/DL
ALP SERPL-CCNC: 135 U/L
ALT SERPL W/O P-5'-P-CCNC: <5 U/L
ANION GAP SERPL CALC-SCNC: 8 MMOL/L
AST SERPL-CCNC: 18 U/L
BASOPHILS # BLD AUTO: 0.01 K/UL
BASOPHILS NFR BLD: 0.3 %
BILIRUB SERPL-MCNC: 2 MG/DL
BLD PROD TYP BPU: NORMAL
BLD PROD TYP BPU: NORMAL
BLOOD UNIT EXPIRATION DATE: NORMAL
BLOOD UNIT EXPIRATION DATE: NORMAL
BLOOD UNIT TYPE CODE: 5100
BLOOD UNIT TYPE CODE: 5100
BLOOD UNIT TYPE: NORMAL
BLOOD UNIT TYPE: NORMAL
BUN SERPL-MCNC: 20 MG/DL
CALCIUM SERPL-MCNC: 8.5 MG/DL
CHLORIDE SERPL-SCNC: 104 MMOL/L
CO2 SERPL-SCNC: 24 MMOL/L
CODING SYSTEM: NORMAL
CODING SYSTEM: NORMAL
CREAT SERPL-MCNC: 3.5 MG/DL
CYCLOSPORINE BLD LC/MS/MS-MCNC: 168 NG/ML
DIFFERENTIAL METHOD: ABNORMAL
DISPENSE STATUS: NORMAL
DISPENSE STATUS: NORMAL
EOSINOPHIL # BLD AUTO: 0 K/UL
EOSINOPHIL NFR BLD: 1.2 %
ERYTHROCYTE [DISTWIDTH] IN BLOOD BY AUTOMATED COUNT: 15.1 %
EST. GFR  (AFRICAN AMERICAN): 25.9 ML/MIN/1.73 M^2
EST. GFR  (NON AFRICAN AMERICAN): 22.4 ML/MIN/1.73 M^2
FLOW CYTOMETRY SPECIALIST REVIEW: NORMAL
GLUCOSE SERPL-MCNC: 93 MG/DL
HCT VFR BLD AUTO: 21.5 %
HGB BLD-MCNC: 7.1 G/DL
IMM GRANULOCYTES # BLD AUTO: 0.03 K/UL
IMM GRANULOCYTES NFR BLD AUTO: 0.9 %
INR PPP: 1
LYMPHOCYTES # BLD AUTO: 0.9 K/UL
LYMPHOCYTES NFR BLD: 26.8 %
MAGNESIUM SERPL-MCNC: 1.7 MG/DL
MCH RBC QN AUTO: 30.5 PG
MCHC RBC AUTO-ENTMCNC: 33 G/DL
MCV RBC AUTO: 92 FL
MONOCYTES # BLD AUTO: 0.3 K/UL
MONOCYTES NFR BLD: 8.2 %
NEUTROPHILS # BLD AUTO: 2.1 K/UL
NEUTROPHILS NFR BLD: 62.6 %
NRBC BLD-RTO: 0 /100 WBC
PHOSPHATE SERPL-MCNC: 2.8 MG/DL
PLATELET # BLD AUTO: 15 K/UL
PMV BLD AUTO: 11.7 FL
PNH GRANULOCYTES: 0 % (ref 0–0.01)
PNH MONOCYTES: 0 % (ref 0–0.05)
PNH RBC-COMPLETE AG LOSS: 0 % (ref 0–0.01)
PNH RBC-PARTIAL AG LOSS: 0.03 % (ref 0–0.99)
POTASSIUM SERPL-SCNC: 3.9 MMOL/L
PROT SERPL-MCNC: 5.8 G/DL
PROTHROMBIN TIME: 10.6 SEC
RBC # BLD AUTO: 2.33 M/UL
RETICS/RBC NFR AUTO: 2 %
SODIUM SERPL-SCNC: 136 MMOL/L
TRANS ERYTHROCYTES VOL PATIENT: NORMAL ML
TRANS ERYTHROCYTES VOL PATIENT: NORMAL ML
WBC # BLD AUTO: 3.28 K/UL

## 2018-05-12 PROCEDURE — 25000003 PHARM REV CODE 250: Mod: NTX | Performed by: NURSE PRACTITIONER

## 2018-05-12 PROCEDURE — 83735 ASSAY OF MAGNESIUM: CPT | Mod: NTX

## 2018-05-12 PROCEDURE — 36430 TRANSFUSION BLD/BLD COMPNT: CPT | Mod: NTX

## 2018-05-12 PROCEDURE — 99233 SBSQ HOSP IP/OBS HIGH 50: CPT | Mod: NTX,,, | Performed by: PHYSICIAN ASSISTANT

## 2018-05-12 PROCEDURE — 20600001 HC STEP DOWN PRIVATE ROOM: Mod: NTX

## 2018-05-12 PROCEDURE — 25000003 PHARM REV CODE 250: Mod: NTX | Performed by: STUDENT IN AN ORGANIZED HEALTH CARE EDUCATION/TRAINING PROGRAM

## 2018-05-12 PROCEDURE — 80158 DRUG ASSAY CYCLOSPORINE: CPT | Mod: NTX

## 2018-05-12 PROCEDURE — 85610 PROTHROMBIN TIME: CPT | Mod: NTX

## 2018-05-12 PROCEDURE — P9021 RED BLOOD CELLS UNIT: HCPCS | Mod: NTX

## 2018-05-12 PROCEDURE — 85045 AUTOMATED RETICULOCYTE COUNT: CPT | Mod: NTX

## 2018-05-12 PROCEDURE — 84100 ASSAY OF PHOSPHORUS: CPT | Mod: NTX

## 2018-05-12 PROCEDURE — 86022 PLATELET ANTIBODIES: CPT | Mod: NTX

## 2018-05-12 PROCEDURE — 63600175 PHARM REV CODE 636 W HCPCS: Mod: NTX | Performed by: STUDENT IN AN ORGANIZED HEALTH CARE EDUCATION/TRAINING PROGRAM

## 2018-05-12 PROCEDURE — 85025 COMPLETE CBC W/AUTO DIFF WBC: CPT | Mod: NTX

## 2018-05-12 PROCEDURE — 36415 COLL VENOUS BLD VENIPUNCTURE: CPT | Mod: NTX

## 2018-05-12 PROCEDURE — 27201040 HC RC 50 FILTER: Mod: NTX

## 2018-05-12 PROCEDURE — 80053 COMPREHEN METABOLIC PANEL: CPT | Mod: NTX

## 2018-05-12 PROCEDURE — 63600175 PHARM REV CODE 636 W HCPCS: Mod: NTX | Performed by: NURSE PRACTITIONER

## 2018-05-12 RX ORDER — ONDANSETRON 4 MG/1
4 TABLET, FILM COATED ORAL EVERY 6 HOURS PRN
Status: DISCONTINUED | OUTPATIENT
Start: 2018-05-12 | End: 2018-05-23 | Stop reason: HOSPADM

## 2018-05-12 RX ORDER — HEPARIN SODIUM 5000 [USP'U]/ML
5000 INJECTION, SOLUTION INTRAVENOUS; SUBCUTANEOUS EVERY 12 HOURS
Status: DISCONTINUED | OUTPATIENT
Start: 2018-05-13 | End: 2018-05-13

## 2018-05-12 RX ORDER — HYDROCODONE BITARTRATE AND ACETAMINOPHEN 500; 5 MG/1; MG/1
TABLET ORAL
Status: DISCONTINUED | OUTPATIENT
Start: 2018-05-12 | End: 2018-05-13

## 2018-05-12 RX ADMIN — OXYCODONE HYDROCHLORIDE 10 MG: 5 TABLET ORAL at 01:05

## 2018-05-12 RX ADMIN — CYCLOSPORINE 100 MG: 100 CAPSULE, LIQUID FILLED ORAL at 09:05

## 2018-05-12 RX ADMIN — LEVOTHYROXINE SODIUM 75 MCG: 25 TABLET ORAL at 06:05

## 2018-05-12 RX ADMIN — METOCLOPRAMIDE HYDROCHLORIDE 10 MG: 5 TABLET ORAL at 05:05

## 2018-05-12 RX ADMIN — OXYCODONE HYDROCHLORIDE 10 MG: 5 TABLET ORAL at 07:05

## 2018-05-12 RX ADMIN — METOCLOPRAMIDE HYDROCHLORIDE 10 MG: 5 TABLET ORAL at 06:05

## 2018-05-12 RX ADMIN — DRONABINOL 2.5 MG: 2.5 CAPSULE ORAL at 09:05

## 2018-05-12 RX ADMIN — METOCLOPRAMIDE HYDROCHLORIDE 10 MG: 5 TABLET ORAL at 09:05

## 2018-05-12 RX ADMIN — LEVETIRACETAM 500 MG: 500 TABLET ORAL at 08:05

## 2018-05-12 RX ADMIN — CYCLOSPORINE 100 MG: 100 CAPSULE, LIQUID FILLED ORAL at 08:05

## 2018-05-12 RX ADMIN — BISACODYL 10 MG: 5 TABLET, COATED ORAL at 08:05

## 2018-05-12 RX ADMIN — PANTOPRAZOLE SODIUM 40 MG: 40 TABLET, DELAYED RELEASE ORAL at 08:05

## 2018-05-12 RX ADMIN — METOCLOPRAMIDE HYDROCHLORIDE 10 MG: 5 TABLET ORAL at 12:05

## 2018-05-12 RX ADMIN — SEVELAMER CARBONATE 800 MG: 800 TABLET, FILM COATED ORAL at 12:05

## 2018-05-12 RX ADMIN — URSODIOL 300 MG: 300 CAPSULE ORAL at 09:05

## 2018-05-12 RX ADMIN — PROCHLORPERAZINE EDISYLATE 5 MG: 5 INJECTION INTRAMUSCULAR; INTRAVENOUS at 10:05

## 2018-05-12 RX ADMIN — URSODIOL 300 MG: 300 CAPSULE ORAL at 08:05

## 2018-05-12 RX ADMIN — LEVETIRACETAM 500 MG: 500 TABLET ORAL at 09:05

## 2018-05-12 RX ADMIN — OXYCODONE HYDROCHLORIDE 10 MG: 5 TABLET ORAL at 08:05

## 2018-05-12 NOTE — PROGRESS NOTES
[facility]  Liver Transplant  Progress Note    Patient Name: Jhonny Diana  MRN: [id number]  Admission Date: 2018  Hospital Length of Stay: 3 days  Code Status: Full Code  Primary Care Provider: Primary Doctor No  Post-Operative Day: 205    ORGAN:   LIVER  Disease Etiology: Acute Alcoholic Hepatitis  Donor Type:    - Brain Death  CDC High Risk:   No  Donor CMV Status:   Donor CMV Status: Positive  Donor HBcAB:   Negative  Donor HCV Status:   Negative  Whole or Partial: Whole Liver  Biliary Anastomosis: End to End  Arterial Anatomy: Standard  Subjective:     History of Present Illness:  Mr. 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  S/p OLTX 10/19/2017 (Liver) with steroid induction. Post op course with multiple complications to include biliary stricture, FUO, ESRD, and chronic abdominal pain. Recently discharged on  after an ERCP. Reports that since discharge he has had fevers of 100.4 to 100.8. These fevers resolve on their own without medication. Also reports decreased appetite and fatigue. Nausea and abdominal pain are baseline and unchanged from prior to discharge. Normal bowel movements and passing gas. Receiving dialysis Monday and Friday. On admission, patient was afebrile and hemodynamically stable. Labs were stable from prior except tbili which is up to 3.9 from 3.2 yesterday.     Hospital Course:  Interval History: No acute events overnight. Patient feeling well today. H/H with slight decrease again today. Will transfuse 1 unit of PRBC. Discussed with heme/onc. Parvo titers in process. FOBT ordered, awaiting BM. If no improvement or continues to decrease, will need bone marrow biopsy per heme/onc. Appreciate their assistance.       Scheduled Meds:   bisacodyl  10 mg Oral Daily    cycloSPORINE modified (NEORAL)  100 mg Oral BID    dronabinol  2.5 mg Oral QHS    epoetin maurisio (PROCRIT) injection  20,000 Units Intravenous Every Mon, Fri    ergocalciferol   50,000 Units Oral Q7 Days    heparin (porcine)  5,000 Units Subcutaneous Q12H    levETIRAcetam  500 mg Oral BID    levothyroxine  75 mcg Oral Before breakfast    metoclopramide HCl  10 mg Oral QID (AC & HS)    pantoprazole  40 mg Oral Daily    sevelamer carbonate  800 mg Oral TID WM    ursodiol  300 mg Oral BID     Continuous Infusions:  PRN Meds:sodium chloride, sodium chloride, sodium chloride, sodium chloride 0.9%, gentamicin, oxyCODONE, prochlorperazine, promethazine, ramelteon, sodium chloride 0.9%    Review of Systems   Constitutional: Positive for activity change and appetite change.   Respiratory: Negative for cough and shortness of breath.    Gastrointestinal: Positive for abdominal pain, constipation and nausea.   Allergic/Immunologic: Positive for immunocompromised state.   Neurological: Positive for weakness.     Objective:     Vital Signs (Most Recent):  Temp: 98.7 °F (37.1 °C) (05/12/18 1034)  Pulse: 91 (05/12/18 1034)  Resp: 18 (05/12/18 1034)  BP: 131/84 (05/12/18 1034)  SpO2: 96 % (05/12/18 1034) Vital Signs (24h Range):  Temp:  [98.2 °F (36.8 °C)-99.5 °F (37.5 °C)] 98.7 °F (37.1 °C)  Pulse:  [74-96] 91  Resp:  [16-18] 18  SpO2:  [96 %-98 %] 96 %  BP: (122-150)/(84-91) 131/84     Weight: 59.1 kg (130 lb 4.7 oz)  Body mass index is 21.03 kg/m².    Intake/Output - Last 3 Shifts       05/10 0700 - 05/11 0659 05/11 0700 - 05/12 0659 05/12 0700 - 05/13 0659    P.O. 420 1020     Other  600     Total Intake(mL/kg) 420 (7.1) 1620 (27.4)     Urine (mL/kg/hr) 150 (0.1) 800 (0.6)     Other  2600 (1.8)     Total Output 150 3400      Net +270 -1780             Urine Occurrence 1 x            Physical Exam   Constitutional: He is oriented to person, place, and time. He appears well-developed.   Hand and temporal muscle wasting   HENT:   Head: Normocephalic and atraumatic.   Eyes: Pupils are equal, round, and reactive to light.   Neck: Normal range of motion.   Cardiovascular: Normal rate, regular rhythm,  normal heart sounds and intact distal pulses.    Pulmonary/Chest: Effort normal and breath sounds normal.   Abdominal: Soft. Bowel sounds are normal.   Musculoskeletal: Normal range of motion. He exhibits no edema.   Neurological: He is alert and oriented to person, place, and time.   Skin: Skin is warm and dry. There is pallor.   Psychiatric: He has a normal mood and affect. His behavior is normal. Judgment and thought content normal.   Nursing note and vitals reviewed.      Laboratory:  Immunosuppressants         Stop Route Frequency     cycloSPORINE modified (NEORAL) capsule 100 mg      -- Oral 2 times daily        CBC:     Recent Labs  Lab 05/12/18 0513   WBC 3.28*   RBC 2.33*   HGB 7.1*   HCT 21.5*   PLT 15*   MCV 92   MCH 30.5   MCHC 33.0     CMP:     Recent Labs  Lab 05/12/18 0513   GLU 93   CALCIUM 8.5*   ALBUMIN 2.1*   PROT 5.8*      K 3.9   CO2 24      BUN 20   CREATININE 3.5*   ALKPHOS 135   ALT <5*   AST 18   BILITOT 2.0*     Coagulation:     Recent Labs  Lab 05/12/18 0513   INR 1.0     Labs within the past 24 hours have been reviewed.    Diagnostic Results:  None    Assessment/Plan:     Anemia of chronic renal failure    - h/h chronically low  - continue epoetin  - hemolysis labs concerning - hematology consulted & following  -H/H with slight decrease again today. Will transfuse 1 unit of PRBC. Discussed with heme/onc. Parvo titers in process. FOBT ordered, awaiting BM. If no improvement or continues to decrease, will need bone marrow biopsy per heme/onc. Appreciate their assistance.               Hyperphosphatemia    - continue sevelamer carbonate  - monitor labs daily          Vitamin D deficiency    - continue ergo replacement          ESRD Monday/Friday    - nephrology following  - making some urine, however not clearing  - Monday/friday dialysis schedule            Severe protein-calorie malnutrition    - continue dronabinol for appetite stimulant  - po supplements  - appetite  improving, monitor        Biliary stricture of transplanted liver    - stent in place per imaging  - PBS consulted for possible ERCP - not recommending ERCP for now          Hypomagnesemia    - Improved.  - monitor labs daily        Long-term use of immunosuppressant medication    See prophylactic immunosuppression        Prophylactic immunotherapy    - continue cyclosporin  - monitor cya levels daily and adjust for therapeutic dose          S/P liver transplant    - LFTs stable  - liver u/s reviewed - unremarkable  - monitor              VTE Risk Mitigation         Ordered     heparin (porcine) injection 5,000 Units  Every 12 hours      05/12/18 1220     IP VTE HIGH RISK PATIENT  Once      05/09/18 0044          The patients clinical status was discussed at multidisplinary rounds, involving transplant surgery, transplant medicine, pharmacy, nursing, nutrition, and social work    Discharge Planning: Not a candidate for discharge at this time.       Citlali Bridges PAShahrzadC  Liver Transplant  [facility]

## 2018-05-12 NOTE — PLAN OF CARE
Problem: Patient Care Overview  Goal: Plan of Care Review  Outcome: Ongoing (interventions implemented as appropriate)  Pt AAO x 4. Pt independent with use of walker. Pt received one unit of PRBC today. Pt tolerated well. Pt complaining of nausea. PRN compazine given with little relief. PRN pain medication given for abdominal pain.   Pt free of falls and injury. Pt wearing nonskid socks/shoes when out of bed, bed in lowest position, side rails up x 2, call light within reach. Mother at bedside.

## 2018-05-12 NOTE — ASSESSMENT & PLAN NOTE
- h/h chronically low  - continue epoetin  - hemolysis labs concerning - hematology consulted & following  -H/H with slight decrease again today. Will transfuse 1 unit of PRBC. Discussed with heme/onc. Parvo titers in process. FOBT ordered, awaiting BM. If no improvement or continues to decrease, will need bone marrow biopsy per heme/onc. Appreciate their assistance.

## 2018-05-12 NOTE — SUBJECTIVE & OBJECTIVE
Scheduled Meds:   bisacodyl  10 mg Oral Daily    cycloSPORINE modified (NEORAL)  100 mg Oral BID    dronabinol  2.5 mg Oral QHS    epoetin maurisio (PROCRIT) injection  20,000 Units Intravenous Every Mon, Fri    ergocalciferol  50,000 Units Oral Q7 Days    heparin (porcine)  5,000 Units Subcutaneous Q12H    levETIRAcetam  500 mg Oral BID    levothyroxine  75 mcg Oral Before breakfast    metoclopramide HCl  10 mg Oral QID (AC & HS)    pantoprazole  40 mg Oral Daily    sevelamer carbonate  800 mg Oral TID WM    ursodiol  300 mg Oral BID     Continuous Infusions:  PRN Meds:sodium chloride, sodium chloride, sodium chloride, sodium chloride 0.9%, gentamicin, oxyCODONE, prochlorperazine, promethazine, ramelteon, sodium chloride 0.9%    Review of Systems   Constitutional: Positive for activity change and appetite change.   Respiratory: Negative for cough and shortness of breath.    Gastrointestinal: Positive for abdominal pain, constipation and nausea.   Allergic/Immunologic: Positive for immunocompromised state.   Neurological: Positive for weakness.     Objective:     Vital Signs (Most Recent):  Temp: 98.7 °F (37.1 °C) (05/12/18 1034)  Pulse: 91 (05/12/18 1034)  Resp: 18 (05/12/18 1034)  BP: 131/84 (05/12/18 1034)  SpO2: 96 % (05/12/18 1034) Vital Signs (24h Range):  Temp:  [98.2 °F (36.8 °C)-99.5 °F (37.5 °C)] 98.7 °F (37.1 °C)  Pulse:  [74-96] 91  Resp:  [16-18] 18  SpO2:  [96 %-98 %] 96 %  BP: (122-150)/(84-91) 131/84     Weight: 59.1 kg (130 lb 4.7 oz)  Body mass index is 21.03 kg/m².    Intake/Output - Last 3 Shifts       05/10 0700 - 05/11 0659 05/11 0700 - 05/12 0659 05/12 0700 - 05/13 0659    P.O. 420 1020     Other  600     Total Intake(mL/kg) 420 (7.1) 1620 (27.4)     Urine (mL/kg/hr) 150 (0.1) 800 (0.6)     Other  2600 (1.8)     Total Output 150 3400      Net +270 -1780             Urine Occurrence 1 x            Physical Exam   Constitutional: He is oriented to person, place, and time. He appears  well-developed.   Hand and temporal muscle wasting   HENT:   Head: Normocephalic and atraumatic.   Eyes: Pupils are equal, round, and reactive to light.   Neck: Normal range of motion.   Cardiovascular: Normal rate, regular rhythm, normal heart sounds and intact distal pulses.    Pulmonary/Chest: Effort normal and breath sounds normal.   Abdominal: Soft. Bowel sounds are normal.   Musculoskeletal: Normal range of motion. He exhibits no edema.   Neurological: He is alert and oriented to person, place, and time.   Skin: Skin is warm and dry. There is pallor.   Psychiatric: He has a normal mood and affect. His behavior is normal. Judgment and thought content normal.   Nursing note and vitals reviewed.      Laboratory:  Immunosuppressants         Stop Route Frequency     cycloSPORINE modified (NEORAL) capsule 100 mg      -- Oral 2 times daily        CBC:     Recent Labs  Lab 05/12/18 0513   WBC 3.28*   RBC 2.33*   HGB 7.1*   HCT 21.5*   PLT 15*   MCV 92   MCH 30.5   MCHC 33.0     CMP:     Recent Labs  Lab 05/12/18 0513   GLU 93   CALCIUM 8.5*   ALBUMIN 2.1*   PROT 5.8*      K 3.9   CO2 24      BUN 20   CREATININE 3.5*   ALKPHOS 135   ALT <5*   AST 18   BILITOT 2.0*     Coagulation:     Recent Labs  Lab 05/12/18 0513   INR 1.0     Labs within the past 24 hours have been reviewed.    Diagnostic Results:  None

## 2018-05-13 LAB
ABO + RH BLD: NORMAL
ALBUMIN SERPL BCP-MCNC: 2.2 G/DL
ALP SERPL-CCNC: 131 U/L
ALT SERPL W/O P-5'-P-CCNC: <5 U/L
ANION GAP SERPL CALC-SCNC: 8 MMOL/L
AST SERPL-CCNC: 16 U/L
BASOPHILS # BLD AUTO: ABNORMAL K/UL
BASOPHILS NFR BLD: 0 %
BILIRUB SERPL-MCNC: 2.3 MG/DL
BLD GP AB SCN CELLS X3 SERPL QL: NORMAL
BLD PROD TYP BPU: NORMAL
BLOOD UNIT EXPIRATION DATE: NORMAL
BLOOD UNIT TYPE CODE: 7300
BLOOD UNIT TYPE: NORMAL
BUN SERPL-MCNC: 27 MG/DL
CALCIUM SERPL-MCNC: 8.7 MG/DL
CHLORIDE SERPL-SCNC: 102 MMOL/L
CO2 SERPL-SCNC: 23 MMOL/L
CODING SYSTEM: NORMAL
CREAT SERPL-MCNC: 4.1 MG/DL
CYCLOSPORINE BLD LC/MS/MS-MCNC: 356 NG/ML
DIFFERENTIAL METHOD: ABNORMAL
DISPENSE STATUS: NORMAL
EOSINOPHIL # BLD AUTO: ABNORMAL K/UL
EOSINOPHIL NFR BLD: 0 %
ERYTHROCYTE [DISTWIDTH] IN BLOOD BY AUTOMATED COUNT: 14.9 %
EST. GFR  (AFRICAN AMERICAN): 21.4 ML/MIN/1.73 M^2
EST. GFR  (NON AFRICAN AMERICAN): 18.5 ML/MIN/1.73 M^2
GLUCOSE SERPL-MCNC: 84 MG/DL
HCT VFR BLD AUTO: 24.4 %
HGB BLD-MCNC: 8.1 G/DL
IMM GRANULOCYTES # BLD AUTO: ABNORMAL K/UL
IMM GRANULOCYTES NFR BLD AUTO: ABNORMAL %
INR PPP: 1
LYMPHOCYTES # BLD AUTO: ABNORMAL K/UL
LYMPHOCYTES NFR BLD: 11 %
MAGNESIUM SERPL-MCNC: 1.6 MG/DL
MCH RBC QN AUTO: 30.8 PG
MCHC RBC AUTO-ENTMCNC: 33.2 G/DL
MCV RBC AUTO: 93 FL
MONOCYTES # BLD AUTO: ABNORMAL K/UL
MONOCYTES NFR BLD: 5 %
NEUTROPHILS NFR BLD: 83 %
NEUTS BAND NFR BLD MANUAL: 1 %
NRBC BLD-RTO: 0 /100 WBC
PHOSPHATE SERPL-MCNC: 3.4 MG/DL
PLATELET # BLD AUTO: 9 K/UL
PLATELET BLD QL SMEAR: ABNORMAL
PMV BLD AUTO: 9 FL
POTASSIUM SERPL-SCNC: 4 MMOL/L
PROT SERPL-MCNC: 5.9 G/DL
PROTHROMBIN TIME: 10.6 SEC
RBC # BLD AUTO: 2.63 M/UL
RETICS/RBC NFR AUTO: 2.3 %
SODIUM SERPL-SCNC: 133 MMOL/L
TRANS PLATPHERESIS VOL PATIENT: NORMAL ML
WBC # BLD AUTO: 3.79 K/UL

## 2018-05-13 PROCEDURE — P9035 PLATELET PHERES LEUKOREDUCED: HCPCS | Mod: NTX

## 2018-05-13 PROCEDURE — 99233 SBSQ HOSP IP/OBS HIGH 50: CPT | Mod: NTX,,, | Performed by: PHYSICIAN ASSISTANT

## 2018-05-13 PROCEDURE — 85025 COMPLETE CBC W/AUTO DIFF WBC: CPT | Mod: NTX

## 2018-05-13 PROCEDURE — 36430 TRANSFUSION BLD/BLD COMPNT: CPT | Mod: NTX

## 2018-05-13 PROCEDURE — 80053 COMPREHEN METABOLIC PANEL: CPT | Mod: NTX

## 2018-05-13 PROCEDURE — 25000003 PHARM REV CODE 250: Mod: NTX | Performed by: PHYSICIAN ASSISTANT

## 2018-05-13 PROCEDURE — 82272 OCCULT BLD FECES 1-3 TESTS: CPT | Mod: NTX

## 2018-05-13 PROCEDURE — 85045 AUTOMATED RETICULOCYTE COUNT: CPT | Mod: NTX

## 2018-05-13 PROCEDURE — 20600001 HC STEP DOWN PRIVATE ROOM: Mod: NTX

## 2018-05-13 PROCEDURE — 84100 ASSAY OF PHOSPHORUS: CPT | Mod: NTX

## 2018-05-13 PROCEDURE — 63600175 PHARM REV CODE 636 W HCPCS: Mod: NTX | Performed by: STUDENT IN AN ORGANIZED HEALTH CARE EDUCATION/TRAINING PROGRAM

## 2018-05-13 PROCEDURE — 86920 COMPATIBILITY TEST SPIN: CPT | Mod: NTX

## 2018-05-13 PROCEDURE — 83735 ASSAY OF MAGNESIUM: CPT | Mod: NTX

## 2018-05-13 PROCEDURE — 25000003 PHARM REV CODE 250: Mod: NTX | Performed by: NURSE PRACTITIONER

## 2018-05-13 PROCEDURE — 86901 BLOOD TYPING SEROLOGIC RH(D): CPT | Mod: NTX

## 2018-05-13 PROCEDURE — 25000003 PHARM REV CODE 250: Mod: NTX | Performed by: STUDENT IN AN ORGANIZED HEALTH CARE EDUCATION/TRAINING PROGRAM

## 2018-05-13 PROCEDURE — 80158 DRUG ASSAY CYCLOSPORINE: CPT | Mod: NTX

## 2018-05-13 PROCEDURE — 85610 PROTHROMBIN TIME: CPT | Mod: NTX

## 2018-05-13 RX ORDER — HYDROCODONE BITARTRATE AND ACETAMINOPHEN 500; 5 MG/1; MG/1
TABLET ORAL
Status: DISCONTINUED | OUTPATIENT
Start: 2018-05-13 | End: 2018-05-16

## 2018-05-13 RX ORDER — BISACODYL 10 MG
10 SUPPOSITORY, RECTAL RECTAL ONCE
Status: COMPLETED | OUTPATIENT
Start: 2018-05-13 | End: 2018-05-13

## 2018-05-13 RX ORDER — AMLODIPINE BESYLATE 5 MG/1
5 TABLET ORAL ONCE
Status: COMPLETED | OUTPATIENT
Start: 2018-05-13 | End: 2018-05-13

## 2018-05-13 RX ORDER — AMLODIPINE BESYLATE 5 MG/1
5 TABLET ORAL DAILY
Status: DISCONTINUED | OUTPATIENT
Start: 2018-05-13 | End: 2018-05-23 | Stop reason: HOSPADM

## 2018-05-13 RX ADMIN — OXYCODONE HYDROCHLORIDE 10 MG: 5 TABLET ORAL at 12:05

## 2018-05-13 RX ADMIN — LEVOTHYROXINE SODIUM 75 MCG: 25 TABLET ORAL at 06:05

## 2018-05-13 RX ADMIN — AMLODIPINE BESYLATE 5 MG: 5 TABLET ORAL at 06:05

## 2018-05-13 RX ADMIN — CYCLOSPORINE 100 MG: 100 CAPSULE, LIQUID FILLED ORAL at 08:05

## 2018-05-13 RX ADMIN — SEVELAMER CARBONATE 800 MG: 800 TABLET, FILM COATED ORAL at 04:05

## 2018-05-13 RX ADMIN — LEVETIRACETAM 500 MG: 500 TABLET ORAL at 08:05

## 2018-05-13 RX ADMIN — PANTOPRAZOLE SODIUM 40 MG: 40 TABLET, DELAYED RELEASE ORAL at 08:05

## 2018-05-13 RX ADMIN — METOCLOPRAMIDE HYDROCHLORIDE 10 MG: 5 TABLET ORAL at 08:05

## 2018-05-13 RX ADMIN — URSODIOL 300 MG: 300 CAPSULE ORAL at 08:05

## 2018-05-13 RX ADMIN — AMLODIPINE BESYLATE 5 MG: 5 TABLET ORAL at 04:05

## 2018-05-13 RX ADMIN — METOCLOPRAMIDE HYDROCHLORIDE 10 MG: 5 TABLET ORAL at 12:05

## 2018-05-13 RX ADMIN — OXYCODONE HYDROCHLORIDE 10 MG: 5 TABLET ORAL at 08:05

## 2018-05-13 RX ADMIN — OXYCODONE HYDROCHLORIDE 10 MG: 5 TABLET ORAL at 04:05

## 2018-05-13 RX ADMIN — BISACODYL 10 MG: 10 SUPPOSITORY RECTAL at 09:05

## 2018-05-13 RX ADMIN — SEVELAMER CARBONATE 800 MG: 800 TABLET, FILM COATED ORAL at 12:05

## 2018-05-13 RX ADMIN — SEVELAMER CARBONATE 800 MG: 800 TABLET, FILM COATED ORAL at 08:05

## 2018-05-13 RX ADMIN — BISACODYL 10 MG: 5 TABLET, COATED ORAL at 08:05

## 2018-05-13 RX ADMIN — METOCLOPRAMIDE HYDROCHLORIDE 10 MG: 5 TABLET ORAL at 04:05

## 2018-05-13 RX ADMIN — METOCLOPRAMIDE HYDROCHLORIDE 10 MG: 5 TABLET ORAL at 06:05

## 2018-05-13 RX ADMIN — DRONABINOL 2.5 MG: 2.5 CAPSULE ORAL at 08:05

## 2018-05-13 NOTE — PLAN OF CARE
Problem: Patient Care Overview  Goal: Plan of Care Review  Outcome: Ongoing (interventions implemented as appropriate)  Pt AAO x 4. Pt independent with walker. Pt complaining of abdominal pain. PRN oxycodone given q 4 hours with some relief. Platelets 9,000. 1 unit of platelets transfused today. Pt tolerating well. Pt started on amlodipine today for blood pressure.   Pt free of falls and injury. Pt wearing nonskid socks/shoes when out of bed, bed in lowest position, side rails up x 2, call light within reach.

## 2018-05-13 NOTE — PROGRESS NOTES
05/13/18 1821 05/13/18 1823 05/13/18 1825   Vital Signs   BP (!) 154/98 (!) 155/106 (!) 154/99   Patient Position Lying Sitting Standing

## 2018-05-13 NOTE — PROGRESS NOTES
[facility]  Liver Transplant  Progress Note    Patient Name: Jhonny Diana  MRN: [id number]  Admission Date: 2018  Hospital Length of Stay: 4 days  Code Status: Full Code  Primary Care Provider: Primary Doctor No  Post-Operative Day: 206    ORGAN:   LIVER  Disease Etiology: Acute Alcoholic Hepatitis  Donor Type:    - Brain Death  CDC High Risk:   No  Donor CMV Status:   Donor CMV Status: Positive  Donor HBcAB:   Negative  Donor HCV Status:   Negative  Whole or Partial: Whole Liver  Biliary Anastomosis: End to End  Arterial Anatomy: Standard  Subjective:     History of Present Illness:  Mr. 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  S/p OLTX 10/19/2017 (Liver) with steroid induction. Post op course with multiple complications to include biliary stricture, FUO, ESRD, and chronic abdominal pain. Recently discharged on  after an ERCP. Reports that since discharge he has had fevers of 100.4 to 100.8. These fevers resolve on their own without medication. Also reports decreased appetite and fatigue. Nausea and abdominal pain are baseline and unchanged from prior to discharge. Normal bowel movements and passing gas. Receiving dialysis Monday and Friday. On admission, patient was afebrile and hemodynamically stable. Labs were stable from prior except tbili which is up to 3.9 from 3.2 yesterday.     Hospital Course:  Interval History: No acute events overnight. Patient feeling well today. H/H improved s/p 1 unit of PRBC yesterday. With worsening thrombocytopenia the past few days. Platelets were in the 20s on admit. Now have decreased to 9. HIT panel pending for thrombocytopenia. Subq heparin stopped yesterday. Discussed case with heme/onc who feel as though HIT is less likely culprit as platelet count was low on admission. Per heme/onc will transfuse platelets today. Patient to need bone marrow biopsy this week to assess cause of ongoing pancytopenia. Appreciate heme/onc  assistance. Monitor.        Scheduled Meds:   bisacodyl  10 mg Oral Daily    cycloSPORINE modified (NEORAL)  100 mg Oral BID    dronabinol  2.5 mg Oral QHS    epoetin maurisio (PROCRIT) injection  20,000 Units Intravenous Every Mon, Fri    ergocalciferol  50,000 Units Oral Q7 Days    levETIRAcetam  500 mg Oral BID    levothyroxine  75 mcg Oral Before breakfast    metoclopramide HCl  10 mg Oral QID (AC & HS)    pantoprazole  40 mg Oral Daily    sevelamer carbonate  800 mg Oral TID WM    ursodiol  300 mg Oral BID     Continuous Infusions:  PRN Meds:sodium chloride 0.9%, gentamicin, ondansetron, oxyCODONE, prochlorperazine, ramelteon, sodium chloride 0.9%    Review of Systems   Constitutional: Positive for activity change and appetite change.   Respiratory: Negative for cough and shortness of breath.    Gastrointestinal: Positive for abdominal pain, constipation and nausea.   Allergic/Immunologic: Positive for immunocompromised state.   Neurological: Positive for weakness.     Objective:     Vital Signs (Most Recent):  Temp: 98.8 °F (37.1 °C) (05/13/18 0945)  Pulse: 88 (05/13/18 0945)  Resp: 18 (05/13/18 0945)  BP: (!) 149/96 (05/13/18 0945)  SpO2: 97 % (05/13/18 0945) Vital Signs (24h Range):  Temp:  [98.6 °F (37 °C)-99.3 °F (37.4 °C)] 98.8 °F (37.1 °C)  Pulse:  [70-88] 88  Resp:  [16-20] 18  SpO2:  [96 %-99 %] 97 %  BP: (144-155)/() 149/96     Weight: 58.2 kg (128 lb 4.9 oz)  Body mass index is 20.71 kg/m².    Intake/Output - Last 3 Shifts       05/11 0700 - 05/12 0659 05/12 0700 - 05/13 0659 05/13 0700 - 05/14 0659    P.O. 1020 660     Blood  240     Other 600      Total Intake(mL/kg) 1620 (27.4) 900 (15.5)     Urine (mL/kg/hr) 800 (0.6) 920 (0.7)     Emesis/NG output  0 (0)     Other 2600 (1.8) 0 (0)     Stool  0 (0)     Blood  0 (0)     Total Output 3400 920      Net -1780 -20             Urine Occurrence  1 x     Stool Occurrence  0 x     Emesis Occurrence  0 x           Physical Exam    Constitutional: He is oriented to person, place, and time. He appears well-developed.   Hand and temporal muscle wasting   HENT:   Head: Normocephalic and atraumatic.   Eyes: Pupils are equal, round, and reactive to light.   Neck: Normal range of motion.   Cardiovascular: Normal rate, regular rhythm, normal heart sounds and intact distal pulses.    Pulmonary/Chest: Effort normal and breath sounds normal.   Abdominal: Soft. Bowel sounds are normal.   Musculoskeletal: Normal range of motion. He exhibits no edema.   Neurological: He is alert and oriented to person, place, and time.   Skin: Skin is warm and dry. There is pallor.   Psychiatric: He has a normal mood and affect. His behavior is normal. Judgment and thought content normal.   Nursing note and vitals reviewed.      Laboratory:  Immunosuppressants         Stop Route Frequency     cycloSPORINE modified (NEORAL) capsule 100 mg      -- Oral 2 times daily        CBC:     Recent Labs  Lab 05/12/18  0513 05/13/18  0508   WBC 3.28* 3.79*   RBC 2.33* 2.63*   HGB 7.1* 8.1*   HCT 21.5* 24.4*   PLT 15*  --    MCV 92 93   MCH 30.5 30.8   MCHC 33.0 33.2     CMP:     Recent Labs  Lab 05/13/18  0508   GLU 84   CALCIUM 8.7   ALBUMIN 2.2*   PROT 5.9*   *   K 4.0   CO2 23      BUN 27*   CREATININE 4.1*   ALKPHOS 131   ALT <5*   AST 16   BILITOT 2.3*     Coagulation:     Recent Labs  Lab 05/13/18  0508   INR 1.0     Labs within the past 24 hours have been reviewed.    Diagnostic Results:  None    Assessment/Plan:     Other pancytopenia    With worsening thrombocytopenia the past few days. Platelets were in the 20s on admit. Now have decreased to 9. HIT panel pending for thrombocytopenia. Subq heparin stopped yesterday. Discussed case with heme/onc who feel as though HIT is less likely culprit as platelet count was low on admission. Per heme/onc will transfuse platelets today. Patient to need bone marrow biopsy this week to assess cause of ongoing pancytopenia.  Appreciate heme/onc assistance. Monitor.                    Anemia of chronic renal failure    - h/h chronically low  - continue epoetin  - hemolysis labs concerning - hematology consulted & following  -H/H improved s/p 1 unit of PRBC 5/12. If continues to decrease, will need bone marrow biopsy per heme/onc. Appreciate their assistance.               Hyperphosphatemia    - continue sevelamer carbonate  - monitor labs daily          Vitamin D deficiency    - continue ergo replacement          ESRD Monday/Friday    - nephrology following  - making some urine, however not clearing  - Monday/friday dialysis schedule            Severe protein-calorie malnutrition    - continue dronabinol for appetite stimulant  - po supplements  - appetite improving, monitor        Biliary stricture of transplanted liver    - stent in place per imaging  - PBS consulted for possible ERCP - not recommending ERCP for now          Hypomagnesemia    - Improved.  - monitor labs daily        Long-term use of immunosuppressant medication    See prophylactic immunosuppression        Prophylactic immunotherapy    - continue cyclosporin  - monitor cya levels daily and adjust for therapeutic dose          S/P liver transplant    - LFTs stable  - liver u/s reviewed - unremarkable  - monitor              VTE Risk Mitigation         Ordered     IP VTE HIGH RISK PATIENT  Once      05/09/18 0044          The patients clinical status was discussed at multidisplinary rounds, involving transplant surgery, transplant medicine, pharmacy, nursing, nutrition, and social work    Discharge Planning: Not a candidate for discharge at this time.       Citlali Bridges PAShahrzadC  Liver Transplant  [facility]

## 2018-05-13 NOTE — SUBJECTIVE & OBJECTIVE
Scheduled Meds:   bisacodyl  10 mg Oral Daily    cycloSPORINE modified (NEORAL)  100 mg Oral BID    dronabinol  2.5 mg Oral QHS    epoetin maurisio (PROCRIT) injection  20,000 Units Intravenous Every Mon, Fri    ergocalciferol  50,000 Units Oral Q7 Days    levETIRAcetam  500 mg Oral BID    levothyroxine  75 mcg Oral Before breakfast    metoclopramide HCl  10 mg Oral QID (AC & HS)    pantoprazole  40 mg Oral Daily    sevelamer carbonate  800 mg Oral TID WM    ursodiol  300 mg Oral BID     Continuous Infusions:  PRN Meds:sodium chloride 0.9%, gentamicin, ondansetron, oxyCODONE, prochlorperazine, ramelteon, sodium chloride 0.9%    Review of Systems   Constitutional: Positive for activity change and appetite change.   Respiratory: Negative for cough and shortness of breath.    Gastrointestinal: Positive for abdominal pain, constipation and nausea.   Allergic/Immunologic: Positive for immunocompromised state.   Neurological: Positive for weakness.     Objective:     Vital Signs (Most Recent):  Temp: 98.8 °F (37.1 °C) (05/13/18 0945)  Pulse: 88 (05/13/18 0945)  Resp: 18 (05/13/18 0945)  BP: (!) 149/96 (05/13/18 0945)  SpO2: 97 % (05/13/18 0945) Vital Signs (24h Range):  Temp:  [98.6 °F (37 °C)-99.3 °F (37.4 °C)] 98.8 °F (37.1 °C)  Pulse:  [70-88] 88  Resp:  [16-20] 18  SpO2:  [96 %-99 %] 97 %  BP: (144-155)/() 149/96     Weight: 58.2 kg (128 lb 4.9 oz)  Body mass index is 20.71 kg/m².    Intake/Output - Last 3 Shifts       05/11 0700 - 05/12 0659 05/12 0700 - 05/13 0659 05/13 0700 - 05/14 0659    P.O. 1020 660     Blood  240     Other 600      Total Intake(mL/kg) 1620 (27.4) 900 (15.5)     Urine (mL/kg/hr) 800 (0.6) 920 (0.7)     Emesis/NG output  0 (0)     Other 2600 (1.8) 0 (0)     Stool  0 (0)     Blood  0 (0)     Total Output 3400 920      Net -1780 -20             Urine Occurrence  1 x     Stool Occurrence  0 x     Emesis Occurrence  0 x           Physical Exam   Constitutional: He is oriented to person,  place, and time. He appears well-developed.   Hand and temporal muscle wasting   HENT:   Head: Normocephalic and atraumatic.   Eyes: Pupils are equal, round, and reactive to light.   Neck: Normal range of motion.   Cardiovascular: Normal rate, regular rhythm, normal heart sounds and intact distal pulses.    Pulmonary/Chest: Effort normal and breath sounds normal.   Abdominal: Soft. Bowel sounds are normal.   Musculoskeletal: Normal range of motion. He exhibits no edema.   Neurological: He is alert and oriented to person, place, and time.   Skin: Skin is warm and dry. There is pallor.   Psychiatric: He has a normal mood and affect. His behavior is normal. Judgment and thought content normal.   Nursing note and vitals reviewed.      Laboratory:  Immunosuppressants         Stop Route Frequency     cycloSPORINE modified (NEORAL) capsule 100 mg      -- Oral 2 times daily        CBC:     Recent Labs  Lab 05/12/18  0513 05/13/18  0508   WBC 3.28* 3.79*   RBC 2.33* 2.63*   HGB 7.1* 8.1*   HCT 21.5* 24.4*   PLT 15*  --    MCV 92 93   MCH 30.5 30.8   MCHC 33.0 33.2     CMP:     Recent Labs  Lab 05/13/18  0508   GLU 84   CALCIUM 8.7   ALBUMIN 2.2*   PROT 5.9*   *   K 4.0   CO2 23      BUN 27*   CREATININE 4.1*   ALKPHOS 131   ALT <5*   AST 16   BILITOT 2.3*     Coagulation:     Recent Labs  Lab 05/13/18  0508   INR 1.0     Labs within the past 24 hours have been reviewed.    Diagnostic Results:  None

## 2018-05-13 NOTE — ASSESSMENT & PLAN NOTE
- h/h chronically low  - continue epoetin  - hemolysis labs concerning - hematology consulted & following  -H/H improved s/p 1 unit of PRBC 5/12. If continues to decrease, will need bone marrow biopsy per heme/onc. Appreciate their assistance.

## 2018-05-13 NOTE — PLAN OF CARE
Problem: Patient Care Overview  Goal: Plan of Care Review  Outcome: Ongoing (interventions implemented as appropriate)  - Pt admitted 17 with fever (~100.5 at home). T-max since admit has been 99.5. T-max overnight 99.3.  - Pt was on Rocephin and Flagyl. Cultures had no growth - IV abx d/c'ed.  - Pt has chronic abdominal pain - oxycodone 10 mg given three times overnight.  - Pt is dialysis-dependent - HD  and . Pt appears to be making slightly more urine than during previous admissions - 650 mL overnight. Pt will go to dialysis .  - Pt has been having severe anemia and thrombocytopenia this admission. Pt is on Procrit. H&H yesterday was 7.1 / 21.5 and platelet count was 15,000. Pt's reticulocytes are elevated and his haptoglobin is undetectable. Heme-Onc has been consulted - pt may require bone marrow biopsy. Refreshed type & screen this morning as it was .  - Order noted for fecal occult blood test - no BM overnight so unable to collect specimen.  - T-bili was elevated on admit but has been trending down - biliary stent in proper location per MDs so no need for repeat ERCP at this time. Last ERCP was at the end of April.  - Pt has mother at bedside.

## 2018-05-13 NOTE — ASSESSMENT & PLAN NOTE
With worsening thrombocytopenia the past few days. Platelets were in the 20s on admit. Now have decreased to 9. HIT panel pending for thrombocytopenia. Subq heparin stopped yesterday. Discussed case with heme/onc who feel as though HIT is less likely culprit as platelet count was low on admission. Per heme/onc will transfuse platelets today. Patient to need bone marrow biopsy this week to assess cause of ongoing pancytopenia. Appreciate heme/onc assistance. Monitor.

## 2018-05-13 NOTE — PROGRESS NOTES
[facility]  Liver Transplant  Progress Note    Patient Name: Jhonny Diana  MRN: [id number]  Admission Date: 2018  Hospital Length of Stay: 4 days  Code Status: Full Code  Primary Care Provider: Primary Doctor No  Post-Operative Day: 206    ORGAN:   LIVER  Disease Etiology: Acute Alcoholic Hepatitis  Donor Type:    - Brain Death  CDC High Risk:   No  Donor CMV Status:   Donor CMV Status: Positive  Donor HBcAB:   Negative  Donor HCV Status:   Negative  Whole or Partial: Whole Liver  Biliary Anastomosis: End to End  Arterial Anatomy: Standard  Subjective:     History of Present Illness:  Mr. 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  S/p OLTX 10/19/2017 (Liver) with steroid induction. Post op course with multiple complications to include biliary stricture, FUO, ESRD, and chronic abdominal pain. Recently discharged on  after an ERCP. Reports that since discharge he has had fevers of 100.4 to 100.8. These fevers resolve on their own without medication. Also reports decreased appetite and fatigue. Nausea and abdominal pain are baseline and unchanged from prior to discharge. Normal bowel movements and passing gas. Receiving dialysis Monday and Friday. On admission, patient was afebrile and hemodynamically stable. Labs were stable from prior except tbili which is up to 3.9 from 3.2 yesterday.     Hospital Course:  Interval History: No acute events overnight. Patient feeling well today. H/H improved s/p 1 unit of PRBC yesterday. HIT panel pending for thrombocytopenia. Subq heparin on hold. Patient likely to need bone marrow biopsy this week to assess cause of ongoing pancytopenia. Monitor.        Scheduled Meds:   bisacodyl  10 mg Oral Daily    cycloSPORINE modified (NEORAL)  100 mg Oral BID    dronabinol  2.5 mg Oral QHS    epoetin maurisio (PROCRIT) injection  20,000 Units Intravenous Every Mon, Fri    ergocalciferol  50,000 Units Oral Q7 Days    levETIRAcetam  500 mg  Oral BID    levothyroxine  75 mcg Oral Before breakfast    metoclopramide HCl  10 mg Oral QID (AC & HS)    pantoprazole  40 mg Oral Daily    sevelamer carbonate  800 mg Oral TID WM    ursodiol  300 mg Oral BID     Continuous Infusions:  PRN Meds:sodium chloride 0.9%, gentamicin, ondansetron, oxyCODONE, prochlorperazine, ramelteon, sodium chloride 0.9%    Review of Systems   Constitutional: Positive for activity change and appetite change.   Respiratory: Negative for cough and shortness of breath.    Gastrointestinal: Positive for abdominal pain, constipation and nausea.   Allergic/Immunologic: Positive for immunocompromised state.   Neurological: Positive for weakness.     Objective:     Vital Signs (Most Recent):  Temp: 98.8 °F (37.1 °C) (05/13/18 0945)  Pulse: 88 (05/13/18 0945)  Resp: 18 (05/13/18 0945)  BP: (!) 149/96 (05/13/18 0945)  SpO2: 97 % (05/13/18 0945) Vital Signs (24h Range):  Temp:  [98.6 °F (37 °C)-99.3 °F (37.4 °C)] 98.8 °F (37.1 °C)  Pulse:  [70-88] 88  Resp:  [16-20] 18  SpO2:  [96 %-99 %] 97 %  BP: (144-155)/() 149/96     Weight: 58.2 kg (128 lb 4.9 oz)  Body mass index is 20.71 kg/m².    Intake/Output - Last 3 Shifts       05/11 0700 - 05/12 0659 05/12 0700 - 05/13 0659 05/13 0700 - 05/14 0659    P.O. 1020 660     Blood  240     Other 600      Total Intake(mL/kg) 1620 (27.4) 900 (15.5)     Urine (mL/kg/hr) 800 (0.6) 920 (0.7)     Emesis/NG output  0 (0)     Other 2600 (1.8) 0 (0)     Stool  0 (0)     Blood  0 (0)     Total Output 3400 920      Net -1780 -20             Urine Occurrence  1 x     Stool Occurrence  0 x     Emesis Occurrence  0 x           Physical Exam   Constitutional: He is oriented to person, place, and time. He appears well-developed.   Hand and temporal muscle wasting   HENT:   Head: Normocephalic and atraumatic.   Eyes: Pupils are equal, round, and reactive to light.   Neck: Normal range of motion.   Cardiovascular: Normal rate, regular rhythm, normal heart sounds  and intact distal pulses.    Pulmonary/Chest: Effort normal and breath sounds normal.   Abdominal: Soft. Bowel sounds are normal.   Musculoskeletal: Normal range of motion. He exhibits no edema.   Neurological: He is alert and oriented to person, place, and time.   Skin: Skin is warm and dry. There is pallor.   Psychiatric: He has a normal mood and affect. His behavior is normal. Judgment and thought content normal.   Nursing note and vitals reviewed.      Laboratory:  Immunosuppressants         Stop Route Frequency     cycloSPORINE modified (NEORAL) capsule 100 mg      -- Oral 2 times daily        CBC:     Recent Labs  Lab 05/12/18  0513 05/13/18  0508   WBC 3.28* 3.79*   RBC 2.33* 2.63*   HGB 7.1* 8.1*   HCT 21.5* 24.4*   PLT 15*  --    MCV 92 93   MCH 30.5 30.8   MCHC 33.0 33.2     CMP:     Recent Labs  Lab 05/13/18  0508   GLU 84   CALCIUM 8.7   ALBUMIN 2.2*   PROT 5.9*   *   K 4.0   CO2 23      BUN 27*   CREATININE 4.1*   ALKPHOS 131   ALT <5*   AST 16   BILITOT 2.3*     Coagulation:     Recent Labs  Lab 05/13/18  0508   INR 1.0     Labs within the past 24 hours have been reviewed.    Diagnostic Results:  None    Assessment/Plan:     Other pancytopenia    H/H improved s/p 1 unit of PRBC yesterday. HIT panel pending for thrombocytopenia. Subq heparin on hold.   Heme/onc following. Appreciate their assistance.   Patient likely to need bone marrow biopsy this week to assess cause of ongoing pancytopenia. Monitor.                Anemia of chronic renal failure    - h/h chronically low  - continue epoetin  - hemolysis labs concerning - hematology consulted & following  -H/H improved s/p 1 unit of PRBC 5/12. If continues to decrease, will need bone marrow biopsy per heme/onc. Appreciate their assistance.               Hyperphosphatemia    - continue sevelamer carbonate  - monitor labs daily          Vitamin D deficiency    - continue ergo replacement          ESRD Monday/Friday    - nephrology  following  - making some urine, however not clearing  - Monday/friday dialysis schedule            Severe protein-calorie malnutrition    - continue dronabinol for appetite stimulant  - po supplements  - appetite improving, monitor        Biliary stricture of transplanted liver    - stent in place per imaging  - PBS consulted for possible ERCP - not recommending ERCP for now          Hypomagnesemia    - Improved.  - monitor labs daily        Long-term use of immunosuppressant medication    See prophylactic immunosuppression        Prophylactic immunotherapy    - continue cyclosporin  - monitor cya levels daily and adjust for therapeutic dose          S/P liver transplant    - LFTs stable  - liver u/s reviewed - unremarkable  - monitor              VTE Risk Mitigation         Ordered     IP VTE HIGH RISK PATIENT  Once      05/09/18 0044          The patients clinical status was discussed at multidisplinary rounds, involving transplant surgery, transplant medicine, pharmacy, nursing, nutrition, and social work    Discharge Planning: Not a candidate for discharge at this time.       Citlali Bridges PA-C  Liver Transplant  [facility]

## 2018-05-14 LAB
ALBUMIN SERPL BCP-MCNC: 2.3 G/DL
ALP SERPL-CCNC: 142 U/L
ALT SERPL W/O P-5'-P-CCNC: <5 U/L
ANION GAP SERPL CALC-SCNC: 9 MMOL/L
ANISOCYTOSIS BLD QL SMEAR: SLIGHT
AST SERPL-CCNC: 16 U/L
B19V DNA # SPEC NAA+PROBE: <100 COPIES/ML
BACTERIA BLD CULT: NORMAL
BACTERIA BLD CULT: NORMAL
BASOPHILS # BLD AUTO: 0.02 K/UL
BASOPHILS NFR BLD: 0.5 %
BILIRUB SERPL-MCNC: 2.2 MG/DL
BLD PROD TYP BPU: NORMAL
BLOOD UNIT EXPIRATION DATE: NORMAL
BLOOD UNIT TYPE CODE: 5100
BLOOD UNIT TYPE: NORMAL
BUN SERPL-MCNC: 35 MG/DL
CALCIUM SERPL-MCNC: 8.8 MG/DL
CHLORIDE SERPL-SCNC: 103 MMOL/L
CO2 SERPL-SCNC: 22 MMOL/L
CODING SYSTEM: NORMAL
CREAT SERPL-MCNC: 4.5 MG/DL
CYCLOSPORINE BLD LC/MS/MS-MCNC: 226 NG/ML
DIFFERENTIAL METHOD: ABNORMAL
DISPENSE STATUS: NORMAL
EOSINOPHIL # BLD AUTO: 0.1 K/UL
EOSINOPHIL NFR BLD: 1.6 %
ERYTHROCYTE [DISTWIDTH] IN BLOOD BY AUTOMATED COUNT: 14.9 %
EST. GFR  (AFRICAN AMERICAN): 19.1 ML/MIN/1.73 M^2
EST. GFR  (NON AFRICAN AMERICAN): 16.5 ML/MIN/1.73 M^2
FIBRINOGEN PPP-MCNC: 375 MG/DL
GLUCOSE SERPL-MCNC: 86 MG/DL
HCT VFR BLD AUTO: 23.2 %
HEPARIN INDUCED THROMBOCYTOPENIA: NORMAL
HGB BLD-MCNC: 7.7 G/DL
IMM GRANULOCYTES # BLD AUTO: 0.02 K/UL
IMM GRANULOCYTES NFR BLD AUTO: 0.5 %
INR PPP: 1
LYMPHOCYTES # BLD AUTO: 1 K/UL
LYMPHOCYTES NFR BLD: 25.7 %
MAGNESIUM SERPL-MCNC: 1.6 MG/DL
MCH RBC QN AUTO: 30.7 PG
MCHC RBC AUTO-ENTMCNC: 33.2 G/DL
MCV RBC AUTO: 92 FL
MONOCYTES # BLD AUTO: 0.4 K/UL
MONOCYTES NFR BLD: 10.5 %
NEUTROPHILS # BLD AUTO: 2.3 K/UL
NEUTROPHILS NFR BLD: 61.2 %
NRBC BLD-RTO: 0 /100 WBC
OB PNL STL: POSITIVE
PHOSPHATE SERPL-MCNC: 3.8 MG/DL
PLATELET # BLD AUTO: 6 K/UL
PLATELET BLD QL SMEAR: ABNORMAL
PMV BLD AUTO: ABNORMAL FL
POLYCHROMASIA BLD QL SMEAR: ABNORMAL
POTASSIUM SERPL-SCNC: 4.2 MMOL/L
PREALB SERPL-MCNC: 10 MG/DL
PROT SERPL-MCNC: 6.1 G/DL
PROTHROMBIN TIME: 10.6 SEC
RBC # BLD AUTO: 2.51 M/UL
RETICS/RBC NFR AUTO: 4 %
SODIUM SERPL-SCNC: 134 MMOL/L
TRANS PLATPHERESIS VOL PATIENT: NORMAL ML
WBC # BLD AUTO: 3.81 K/UL

## 2018-05-14 PROCEDURE — 85610 PROTHROMBIN TIME: CPT | Mod: NTX

## 2018-05-14 PROCEDURE — 99233 SBSQ HOSP IP/OBS HIGH 50: CPT | Mod: NTX,,, | Performed by: INTERNAL MEDICINE

## 2018-05-14 PROCEDURE — 20600001 HC STEP DOWN PRIVATE ROOM: Mod: NTX

## 2018-05-14 PROCEDURE — 88305 TISSUE EXAM BY PATHOLOGIST: CPT | Mod: NTX | Performed by: PATHOLOGY

## 2018-05-14 PROCEDURE — P9035 PLATELET PHERES LEUKOREDUCED: HCPCS | Mod: NTX

## 2018-05-14 PROCEDURE — 85045 AUTOMATED RETICULOCYTE COUNT: CPT | Mod: NTX

## 2018-05-14 PROCEDURE — 38221 DX BONE MARROW BIOPSIES: CPT | Mod: RT,NTX,, | Performed by: INTERNAL MEDICINE

## 2018-05-14 PROCEDURE — 85384 FIBRINOGEN ACTIVITY: CPT | Mod: NTX

## 2018-05-14 PROCEDURE — 96372 THER/PROPH/DIAG INJ SC/IM: CPT | Mod: NTX

## 2018-05-14 PROCEDURE — 25000003 PHARM REV CODE 250: Mod: NTX | Performed by: NURSE PRACTITIONER

## 2018-05-14 PROCEDURE — 99233 SBSQ HOSP IP/OBS HIGH 50: CPT | Mod: NTX,,, | Performed by: NURSE PRACTITIONER

## 2018-05-14 PROCEDURE — 25000003 PHARM REV CODE 250: Mod: NTX | Performed by: INTERNAL MEDICINE

## 2018-05-14 PROCEDURE — 80053 COMPREHEN METABOLIC PANEL: CPT | Mod: NTX

## 2018-05-14 PROCEDURE — 84134 ASSAY OF PREALBUMIN: CPT | Mod: NTX

## 2018-05-14 PROCEDURE — 84100 ASSAY OF PHOSPHORUS: CPT | Mod: NTX

## 2018-05-14 PROCEDURE — 63600175 PHARM REV CODE 636 W HCPCS: Mod: NTX | Performed by: STUDENT IN AN ORGANIZED HEALTH CARE EDUCATION/TRAINING PROGRAM

## 2018-05-14 PROCEDURE — 07DR3ZX EXTRACTION OF ILIAC BONE MARROW, PERCUTANEOUS APPROACH, DIAGNOSTIC: ICD-10-PCS | Performed by: INTERNAL MEDICINE

## 2018-05-14 PROCEDURE — 85097 BONE MARROW INTERPRETATION: CPT | Mod: 26,NTX,, | Performed by: PATHOLOGY

## 2018-05-14 PROCEDURE — 90935 HEMODIALYSIS ONE EVALUATION: CPT | Mod: NTX

## 2018-05-14 PROCEDURE — 63600175 PHARM REV CODE 636 W HCPCS: Mod: NTX | Performed by: NURSE PRACTITIONER

## 2018-05-14 PROCEDURE — 38221 DX BONE MARROW BIOPSIES: CPT

## 2018-05-14 PROCEDURE — 83735 ASSAY OF MAGNESIUM: CPT | Mod: NTX

## 2018-05-14 PROCEDURE — 25000003 PHARM REV CODE 250: Mod: NTX | Performed by: STUDENT IN AN ORGANIZED HEALTH CARE EDUCATION/TRAINING PROGRAM

## 2018-05-14 PROCEDURE — 88342 IMHCHEM/IMCYTCHM 1ST ANTB: CPT | Mod: 26,NTX,, | Performed by: PATHOLOGY

## 2018-05-14 PROCEDURE — 88305 TISSUE EXAM BY PATHOLOGIST: CPT | Mod: 26,NTX,, | Performed by: PATHOLOGY

## 2018-05-14 PROCEDURE — 25000003 PHARM REV CODE 250: Mod: NTX | Performed by: HOSPITALIST

## 2018-05-14 PROCEDURE — 36415 COLL VENOUS BLD VENIPUNCTURE: CPT | Mod: NTX

## 2018-05-14 PROCEDURE — 88341 IMHCHEM/IMCYTCHM EA ADD ANTB: CPT | Mod: 26,NTX,, | Performed by: PATHOLOGY

## 2018-05-14 PROCEDURE — 86022 PLATELET ANTIBODIES: CPT | Mod: NTX

## 2018-05-14 PROCEDURE — 88313 SPECIAL STAINS GROUP 2: CPT | Mod: NTX

## 2018-05-14 PROCEDURE — 88313 SPECIAL STAINS GROUP 2: CPT | Mod: 26,NTX,, | Performed by: PATHOLOGY

## 2018-05-14 PROCEDURE — 25000003 PHARM REV CODE 250: Mod: NTX | Performed by: PHYSICIAN ASSISTANT

## 2018-05-14 PROCEDURE — 88311 DECALCIFY TISSUE: CPT | Mod: 26,NTX,, | Performed by: PATHOLOGY

## 2018-05-14 PROCEDURE — 80158 DRUG ASSAY CYCLOSPORINE: CPT | Mod: NTX

## 2018-05-14 PROCEDURE — 88341 IMHCHEM/IMCYTCHM EA ADD ANTB: CPT | Mod: NTX | Performed by: PATHOLOGY

## 2018-05-14 PROCEDURE — 85025 COMPLETE CBC W/AUTO DIFF WBC: CPT | Mod: NTX

## 2018-05-14 PROCEDURE — 90935 HEMODIALYSIS ONE EVALUATION: CPT | Mod: ,,, | Performed by: INTERNAL MEDICINE

## 2018-05-14 RX ORDER — SODIUM CHLORIDE 9 MG/ML
INJECTION, SOLUTION INTRAVENOUS
Status: DISCONTINUED | OUTPATIENT
Start: 2018-05-14 | End: 2018-05-19

## 2018-05-14 RX ORDER — SODIUM CHLORIDE 9 MG/ML
INJECTION, SOLUTION INTRAVENOUS ONCE
Status: COMPLETED | OUTPATIENT
Start: 2018-05-14 | End: 2018-05-14

## 2018-05-14 RX ORDER — FENTANYL CITRATE 50 UG/ML
25 INJECTION, SOLUTION INTRAMUSCULAR; INTRAVENOUS ONCE
Status: COMPLETED | OUTPATIENT
Start: 2018-05-14 | End: 2018-05-14

## 2018-05-14 RX ORDER — FENTANYL CITRATE 50 UG/ML
25 INJECTION, SOLUTION INTRAMUSCULAR; INTRAVENOUS ONCE
Status: DISCONTINUED | OUTPATIENT
Start: 2018-05-14 | End: 2018-05-15

## 2018-05-14 RX ORDER — LIDOCAINE HYDROCHLORIDE 20 MG/ML
10 INJECTION, SOLUTION EPIDURAL; INFILTRATION; INTRACAUDAL; PERINEURAL ONCE
Status: COMPLETED | OUTPATIENT
Start: 2018-05-14 | End: 2018-05-14

## 2018-05-14 RX ORDER — DIAZEPAM 5 MG/1
5 TABLET ORAL
Status: COMPLETED | OUTPATIENT
Start: 2018-05-14 | End: 2018-05-14

## 2018-05-14 RX ORDER — HYDROCODONE BITARTRATE AND ACETAMINOPHEN 500; 5 MG/1; MG/1
TABLET ORAL
Status: DISCONTINUED | OUTPATIENT
Start: 2018-05-14 | End: 2018-05-16

## 2018-05-14 RX ADMIN — OXYCODONE HYDROCHLORIDE 10 MG: 5 TABLET ORAL at 06:05

## 2018-05-14 RX ADMIN — METOCLOPRAMIDE HYDROCHLORIDE 10 MG: 5 TABLET ORAL at 10:05

## 2018-05-14 RX ADMIN — LEVOTHYROXINE SODIUM 75 MCG: 25 TABLET ORAL at 05:05

## 2018-05-14 RX ADMIN — METOCLOPRAMIDE HYDROCHLORIDE 10 MG: 5 TABLET ORAL at 04:05

## 2018-05-14 RX ADMIN — FENTANYL CITRATE 25 MCG: 50 INJECTION, SOLUTION INTRAMUSCULAR; INTRAVENOUS at 08:05

## 2018-05-14 RX ADMIN — AMLODIPINE BESYLATE 5 MG: 5 TABLET ORAL at 10:05

## 2018-05-14 RX ADMIN — BISACODYL 10 MG: 5 TABLET, COATED ORAL at 05:05

## 2018-05-14 RX ADMIN — SODIUM CHLORIDE: 0.9 INJECTION, SOLUTION INTRAVENOUS at 07:05

## 2018-05-14 RX ADMIN — METOCLOPRAMIDE HYDROCHLORIDE 10 MG: 5 TABLET ORAL at 08:05

## 2018-05-14 RX ADMIN — CYCLOSPORINE 100 MG: 100 CAPSULE, LIQUID FILLED ORAL at 05:05

## 2018-05-14 RX ADMIN — DIAZEPAM 5 MG: 5 TABLET ORAL at 02:05

## 2018-05-14 RX ADMIN — OXYCODONE HYDROCHLORIDE 10 MG: 5 TABLET ORAL at 01:05

## 2018-05-14 RX ADMIN — SEVELAMER CARBONATE 800 MG: 800 TABLET, FILM COATED ORAL at 05:05

## 2018-05-14 RX ADMIN — PANTOPRAZOLE SODIUM 40 MG: 40 TABLET, DELAYED RELEASE ORAL at 05:05

## 2018-05-14 RX ADMIN — DRONABINOL 2.5 MG: 2.5 CAPSULE ORAL at 08:05

## 2018-05-14 RX ADMIN — LEVETIRACETAM 500 MG: 500 TABLET ORAL at 08:05

## 2018-05-14 RX ADMIN — OXYCODONE HYDROCHLORIDE 10 MG: 5 TABLET ORAL at 11:05

## 2018-05-14 RX ADMIN — SEVELAMER CARBONATE 800 MG: 800 TABLET, FILM COATED ORAL at 11:05

## 2018-05-14 RX ADMIN — CYCLOSPORINE 100 MG: 100 CAPSULE, LIQUID FILLED ORAL at 08:05

## 2018-05-14 RX ADMIN — OXYCODONE HYDROCHLORIDE 10 MG: 5 TABLET ORAL at 05:05

## 2018-05-14 RX ADMIN — LEVETIRACETAM 500 MG: 500 TABLET ORAL at 10:05

## 2018-05-14 RX ADMIN — ERYTHROPOIETIN 20000 UNITS: 20000 INJECTION, SOLUTION INTRAVENOUS; SUBCUTANEOUS at 10:05

## 2018-05-14 RX ADMIN — GENTAMICIN SULFATE 40 MG: 40 INJECTION, SOLUTION INTRAMUSCULAR; INTRAVENOUS at 10:05

## 2018-05-14 RX ADMIN — URSODIOL 300 MG: 300 CAPSULE ORAL at 08:05

## 2018-05-14 RX ADMIN — LIDOCAINE HYDROCHLORIDE 200 MG: 20 INJECTION, SOLUTION EPIDURAL; INFILTRATION; INTRACAUDAL; PERINEURAL at 02:05

## 2018-05-14 RX ADMIN — URSODIOL 300 MG: 300 CAPSULE ORAL at 10:05

## 2018-05-14 RX ADMIN — OXYCODONE HYDROCHLORIDE 10 MG: 5 TABLET ORAL at 09:05

## 2018-05-14 RX ADMIN — METOCLOPRAMIDE HYDROCHLORIDE 10 MG: 5 TABLET ORAL at 05:05

## 2018-05-14 NOTE — SUBJECTIVE & OBJECTIVE
Scheduled Meds:   amLODIPine  5 mg Oral Daily    bisacodyl  10 mg Oral Daily    cycloSPORINE modified (NEORAL)  100 mg Oral BID    diazePAM  5 mg Oral Once Pre-Op    dronabinol  2.5 mg Oral QHS    epoetin maurisio (PROCRIT) injection  20,000 Units Intravenous Every Mon, Fri    ergocalciferol  50,000 Units Oral Q7 Days    levETIRAcetam  500 mg Oral BID    levothyroxine  75 mcg Oral Before breakfast    lidocaine (PF) 20 mg/ml (2%)  10 mL Other Once    metoclopramide HCl  10 mg Oral QID (AC & HS)    pantoprazole  40 mg Oral Daily    sevelamer carbonate  800 mg Oral TID WM    ursodiol  300 mg Oral BID     Continuous Infusions:  PRN Meds:sodium chloride, sodium chloride, sodium chloride 0.9%, sodium chloride 0.9%, gentamicin, ondansetron, oxyCODONE, prochlorperazine, ramelteon, sodium chloride 0.9%    Review of Systems   Constitutional: Positive for activity change and appetite change.   Respiratory: Negative for cough and shortness of breath.    Gastrointestinal: Positive for abdominal pain, constipation and nausea.   Allergic/Immunologic: Positive for immunocompromised state.   Neurological: Positive for weakness.     Objective:     Vital Signs (Most Recent):  Temp: 98.7 °F (37.1 °C) (05/14/18 1257)  Pulse: 102 (05/14/18 1257)  Resp: 17 (05/14/18 1257)  BP: 139/88 (05/14/18 1257)  SpO2: 98 % (05/14/18 1257) Vital Signs (24h Range):  Temp:  [98.3 °F (36.8 °C)-99.8 °F (37.7 °C)] 98.7 °F (37.1 °C)  Pulse:  [] 102  Resp:  [16-18] 17  SpO2:  [95 %-99 %] 98 %  BP: (133-164)/() 139/88     Weight: 58.3 kg (128 lb 8.5 oz)  Body mass index is 20.74 kg/m².    Intake/Output - Last 3 Shifts       05/12 0700 - 05/13 0659 05/13 0700 - 05/14 0659 05/14 0700 - 05/15 0659    P.O. 660 920     Blood 240 198.8 200    Total Intake(mL/kg) 900 (15.5) 1118.8 (19.2) 200 (3.4)    Urine (mL/kg/hr) 920 (0.7) 895 (0.6)     Emesis/NG output 0 (0)      Other 0 (0)      Stool 0 (0)      Blood 0 (0)      Total Output 920 895       Net -20 +223.8 +200           Urine Occurrence 1 x      Stool Occurrence 0 x 1 x 0 x    Emesis Occurrence 0 x            Physical Exam   Constitutional: He is oriented to person, place, and time. He appears well-developed.   Hand and temporal muscle wasting   HENT:   Head: Normocephalic and atraumatic.   Eyes: Pupils are equal, round, and reactive to light.   Neck: Normal range of motion.   Cardiovascular: Normal rate, regular rhythm, normal heart sounds and intact distal pulses.    Pulmonary/Chest: Effort normal and breath sounds normal.   Abdominal: Soft. Bowel sounds are normal.   Musculoskeletal: Normal range of motion. He exhibits no edema.   Neurological: He is alert and oriented to person, place, and time.   Skin: Skin is warm and dry. There is pallor.   Psychiatric: He has a normal mood and affect. His behavior is normal. Judgment and thought content normal.   Nursing note and vitals reviewed.      Laboratory:  Immunosuppressants         Stop Route Frequency     cycloSPORINE modified (NEORAL) capsule 100 mg      -- Oral 2 times daily        CBC:     Recent Labs  Lab 05/14/18 0524   WBC 3.81*   RBC 2.51*   HGB 7.7*   HCT 23.2*   PLT 6*   MCV 92   MCH 30.7   MCHC 33.2     CMP:     Recent Labs  Lab 05/14/18 0524   GLU 86   CALCIUM 8.8   ALBUMIN 2.3*   PROT 6.1   *   K 4.2   CO2 22*      BUN 35*   CREATININE 4.5*   ALKPHOS 142*   ALT <5*   AST 16   BILITOT 2.2*     Coagulation:     Recent Labs  Lab 05/14/18 0524   INR 1.0     Labs within the past 24 hours have been reviewed.    Diagnostic Results:  None

## 2018-05-14 NOTE — PROGRESS NOTES
[facility]  Liver Transplant  Progress Note    Patient Name: Jhonny Diana  MRN: [id number]  Admission Date: 2018  Hospital Length of Stay: 5 days  Code Status: Full Code  Primary Care Provider: Primary Doctor No  Post-Operative Day: 207    ORGAN:   LIVER  Disease Etiology: Acute Alcoholic Hepatitis  Donor Type:    - Brain Death  [facility] High Risk:   No  Donor CMV Status:   Donor CMV Status: Positive  Donor HBcAB:   Negative  Donor HCV Status:   Negative  Whole or Partial: Whole Liver  Biliary Anastomosis: End to End  Arterial Anatomy: Standard  Subjective:     History of Present Illness:  Mr. 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  S/p OLTX 10/19/2017 (Liver) with steroid induction. Post op course with multiple complications to include biliary stricture, FUO, ESRD, and chronic abdominal pain. Recently discharged on  after an ERCP. Reports that since discharge he has had fevers of 100.4 to 100.8. These fevers resolve on their own without medication. Also reports decreased appetite and fatigue. Nausea and abdominal pain are baseline and unchanged from prior to discharge. Normal bowel movements and passing gas. Receiving dialysis Monday and Friday. On admission, patient was afebrile and hemodynamically stable. Labs were stable from prior except tbili which is up to 3.9 from 3.2 yesterday.     Hospital Course:  Interval History: No acute events overnight. With worsening thrombocytopenia the over the last month.  Platelets in the 20s upon admit, and now decreased to 6. HIT panel pending for thrombocytopenia. Discussed case with heme/onc who feel as though HIT is less likely culprit as platelet count was low on admission. Per heme/onc will transfused platelets  with no response.  Plan for bone marrow biopsy today with platelet transfusion prior.  Appreciate heme/onc assistance. Monitor.        Scheduled Meds:   amLODIPine  5 mg Oral Daily    bisacodyl  10 mg Oral  Daily    cycloSPORINE modified (NEORAL)  100 mg Oral BID    diazePAM  5 mg Oral Once Pre-Op    dronabinol  2.5 mg Oral QHS    epoetin maurisio (PROCRIT) injection  20,000 Units Intravenous Every Mon, Fri    ergocalciferol  50,000 Units Oral Q7 Days    levETIRAcetam  500 mg Oral BID    levothyroxine  75 mcg Oral Before breakfast    lidocaine (PF) 20 mg/ml (2%)  10 mL Other Once    metoclopramide HCl  10 mg Oral QID (AC & HS)    pantoprazole  40 mg Oral Daily    sevelamer carbonate  800 mg Oral TID WM    ursodiol  300 mg Oral BID     Continuous Infusions:  PRN Meds:sodium chloride, sodium chloride, sodium chloride 0.9%, sodium chloride 0.9%, gentamicin, ondansetron, oxyCODONE, prochlorperazine, ramelteon, sodium chloride 0.9%    Review of Systems   Constitutional: Positive for activity change and appetite change.   Respiratory: Negative for cough and shortness of breath.    Gastrointestinal: Positive for abdominal pain, constipation and nausea.   Allergic/Immunologic: Positive for immunocompromised state.   Neurological: Positive for weakness.     Objective:     Vital Signs (Most Recent):  Temp: 98.7 °F (37.1 °C) (05/14/18 1257)  Pulse: 102 (05/14/18 1257)  Resp: 17 (05/14/18 1257)  BP: 139/88 (05/14/18 1257)  SpO2: 98 % (05/14/18 1257) Vital Signs (24h Range):  Temp:  [98.3 °F (36.8 °C)-99.8 °F (37.7 °C)] 98.7 °F (37.1 °C)  Pulse:  [] 102  Resp:  [16-18] 17  SpO2:  [95 %-99 %] 98 %  BP: (133-164)/() 139/88     Weight: 58.3 kg (128 lb 8.5 oz)  Body mass index is 20.74 kg/m².    Intake/Output - Last 3 Shifts       05/12 0700 - 05/13 0659 05/13 0700 - 05/14 0659 05/14 0700 - 05/15 0659    P.O. 660 920     Blood 240 198.8 200    Total Intake(mL/kg) 900 (15.5) 1118.8 (19.2) 200 (3.4)    Urine (mL/kg/hr) 920 (0.7) 895 (0.6)     Emesis/NG output 0 (0)      Other 0 (0)      Stool 0 (0)      Blood 0 (0)      Total Output 920 895      Net -20 +223.8 +200           Urine Occurrence 1 x      Stool Occurrence  0 x 1 x 0 x    Emesis Occurrence 0 x            Physical Exam   Constitutional: He is oriented to person, place, and time. He appears well-developed.   Hand and temporal muscle wasting   HENT:   Head: Normocephalic and atraumatic.   Eyes: Pupils are equal, round, and reactive to light.   Neck: Normal range of motion.   Cardiovascular: Normal rate, regular rhythm, normal heart sounds and intact distal pulses.    Pulmonary/Chest: Effort normal and breath sounds normal.   Abdominal: Soft. Bowel sounds are normal.   Musculoskeletal: Normal range of motion. He exhibits no edema.   Neurological: He is alert and oriented to person, place, and time.   Skin: Skin is warm and dry. There is pallor.   Psychiatric: He has a normal mood and affect. His behavior is normal. Judgment and thought content normal.   Nursing note and vitals reviewed.      Laboratory:  Immunosuppressants         Stop Route Frequency     cycloSPORINE modified (NEORAL) capsule 100 mg      -- Oral 2 times daily        CBC:     Recent Labs  Lab 05/14/18 0524   WBC 3.81*   RBC 2.51*   HGB 7.7*   HCT 23.2*   PLT 6*   MCV 92   MCH 30.7   MCHC 33.2     CMP:     Recent Labs  Lab 05/14/18 0524   GLU 86   CALCIUM 8.8   ALBUMIN 2.3*   PROT 6.1   *   K 4.2   CO2 22*      BUN 35*   CREATININE 4.5*   ALKPHOS 142*   ALT <5*   AST 16   BILITOT 2.2*     Coagulation:     Recent Labs  Lab 05/14/18 0524   INR 1.0     Labs within the past 24 hours have been reviewed.    Diagnostic Results:  None    Assessment/Plan:     Other pancytopenia    - worsening thrombocytopenia, plt 6 post transfusion  - hematology/oncology following  - bone marrow biopsy 5/14  - HIT panel pending. Discussed case with heme/onc who feel as though HIT is less likely culprit as platelet count was low on admission.  - Monitor.                    Severe protein-calorie malnutrition    - continue dronabinol for appetite stimulant  - po supplements  - appetite improving slowly  - dietary  following  - monitor        Anemia of chronic renal failure    - h/h chronically low  - continue epoetin  - hemolysis labs concerning - hematology consulted & following  -H/H improved s/p 1 unit of PRBC 5/12.             Biliary stricture of transplanted liver    - stent in place per imaging  - PBS consulted for possible ERCP - not recommending ERCP for now          S/P liver transplant    - LFTs stable  - liver u/s reviewed - unremarkable  - monitor          Long-term use of immunosuppressant medication    See prophylactic immunosuppression        Prophylactic immunotherapy    - continue cyclosporin  - monitor cya levels daily and adjust for therapeutic dose          Hyperphosphatemia    - continue sevelamer carbonate  - monitor labs daily          Vitamin D deficiency    - continue ergo replacement          ESRD Monday/Friday    - nephrology following  - making some urine, however not clearing  - Monday/friday dialysis schedule            Hypomagnesemia    - Improved.  - monitor labs daily            VTE Risk Mitigation         Ordered     IP VTE HIGH RISK PATIENT  Once      05/09/18 0044          The patients clinical status was discussed at multidisplinary rounds, involving transplant surgery, transplant medicine, pharmacy, nursing, nutrition, and social work    Discharge Planning:  Monitor kidney function --> may need outpt HD  Monitor HH needs vs rehab for deconditioned status      Seda Camacho, NP  Liver Transplant  [facility]

## 2018-05-14 NOTE — ASSESSMENT & PLAN NOTE
- worsening thrombocytopenia, plt 6 post transfusion  - hematology/oncology following  - bone marrow biopsy 5/14  - HIT panel pending. Discussed case with heme/onc who feel as though HIT is less likely culprit as platelet count was low on admission.  - Monitor.

## 2018-05-14 NOTE — PROGRESS NOTES
Platelets verified with Elsy Calles RN. Pre-transfusion VS taken. Discussed the s/s of a reaction with the patient. Patient verbalized his understanding and denies any questions or concerns at this time. Will monitor very closely for the next 15 minutes.

## 2018-05-14 NOTE — PROGRESS NOTES
Collected sample for fecal occult blood testing per order.    Addendum 0340: Result - positive.    Results for MARTHA RAFFY (MRN [id number]) as of 5/14/2018 03:39   Ref. Range 5/13/2018 21:01   Occult Blood Latest Ref Range: Negative  Positive (A)

## 2018-05-14 NOTE — PROGRESS NOTES
Dialysis complete.  Blood returned.    Right chest wall  CVC flushed * 2 with  Saline and gent     Locked with cap and tape.  No s/s infection at cvc site.   Pt tolerated hemodialysis without diff.  Pt ran 3.5   Hrs on hemodialysis machine.   Took off   1220 Ml net volume.      Used F-160 dialyzer.

## 2018-05-14 NOTE — ASSESSMENT & PLAN NOTE
- h/h chronically low  - continue epoetin  - hemolysis labs concerning - hematology consulted & following  -H/H improved s/p 1 unit of PRBC 5/12.

## 2018-05-14 NOTE — ASSESSMENT & PLAN NOTE
- continue dronabinol for appetite stimulant  - po supplements  - appetite improving slowly  - dietary following  - monitor

## 2018-05-14 NOTE — PROGRESS NOTES
Pt arrived via stretcher.  Placed on cardiac monitor and b/p check every 15 minutes.  Right chest wallDialysis access prep with prevantic swab. s.  Hemodialysis started per orders.

## 2018-05-14 NOTE — PROGRESS NOTES
Pt departed to Dialysis in stable condition. Pt going via stretcher. Paper chart with escort. Called report.

## 2018-05-14 NOTE — PLAN OF CARE
Problem: Patient Care Overview  Goal: Plan of Care Review  Outcome: Ongoing (interventions implemented as appropriate)  - Pt admitted 5/8/18 with fever (~100.5 at home). T-max since admit has been 99.8 - this was overnight.  - Pt was on Rocephin and Flagyl. Cultures had no growth - IV abx d/c'ed.  - Pt has chronic abdominal pain - oxycodone 10 mg given twice overnight.  - Pt is dialysis-dependent - pt goes to HD Mondays and Fridays. Pt appears to be making slightly more urine than during previous admissions - 520 mL overnight. Pt will go to dialysis today. Non-dialyzable morning meds given early.  - Pt has been having severe anemia and thrombocytopenia this admission. Pt is on Procrit. Platelet count 5/13 was 9,000 - he received 1 Unit of platelets yesterday. His H&H has been as low as 5.9 / 18.2. Heme-Onc was consulted - bone marrow biopsy contemplated for this week, however pt states he wants to be sedated - not clear if this is feasible or not.  - Stool collected for fecal occult blood overnight - result positive.  - Pt has mother at bedside.

## 2018-05-14 NOTE — PROCEDURES
Bone Marrow Biopsy  Procedure Note      Date of Service: 5/14/2018      Indication/Diagnosis: anemia    Consent Source: self    Consent Type: elective procedure    Time Out Completed: yes    Aseptic Technique: Betadine    Anesthesia: local    Anesthetic Drugs Used: 2 % lidocaine    Instrumentation: bone marrow kit and spinal needle    Procedure Site: right posterior iliac crest    Patient Position: lying on side    Volume Removed (in cc): 0 cc    Aspirate Obtained: no    Fluid Characteristics: N/A    Sterile Dressing: yes    Complications: none    Narrative:  Dry Tap, only 1 small biopsy piece obtained. Procedure aborted due to pain. Advised to lay flat on back for 30 minutes. Monitoring for oozing/bleeding due to thrombocytopenia.     Dereje Robles MD      I have reviewed the notes, assessments, and/or procedures performed by the housestaff, as above.  I have personally interviewed and examined the patient at the beside, and rounded with the housestaff. I concur with her/his assessment and plan and the documentation of Jhonny Diana.  I, Dr. Elliott Burdick, personally spent more than  25 mins during this encounter, greater than 50% was spent in direct counseling and/or coordination of care.     Elliott Burdick M.D., M.S., F.A.C.P.  Hematology/Oncology Attending  [facility]

## 2018-05-14 NOTE — PROGRESS NOTES
Pt arrived via stretcher.  Placed on cardiac monitor and b/p check every 15 minutes.right chest walll  Dialysis access prep with prevantic swab.  maintance Hemodialysis started per orders.

## 2018-05-14 NOTE — PROGRESS NOTES
[facility]  Hematology/Oncology  Progress Note    Patient Name: Jhonny Diana  Admission Date: 5/8/2018  Hospital Length of Stay: 5 days  Code Status: Full Code     Subjective:     Interval History:     - remains afebrile.   - Hb: 7.7 g/dL and Platelet count 9,000/mm3 yesterday, pending this morning.  - Transfused 1 unit of Platelet yesterday.    - chronic abdominal pain, controlled with current regimen.   - HD today.   - denies any melena or BRBPR.     Medications:  Continuous Infusions:  Scheduled Meds:   sodium chloride 0.9%   Intravenous Once    amLODIPine  5 mg Oral Daily    bisacodyl  10 mg Oral Daily    cycloSPORINE modified (NEORAL)  100 mg Oral BID    dronabinol  2.5 mg Oral QHS    epoetin maurisio (PROCRIT) injection  20,000 Units Intravenous Every Mon, Fri    ergocalciferol  50,000 Units Oral Q7 Days    levETIRAcetam  500 mg Oral BID    levothyroxine  75 mcg Oral Before breakfast    metoclopramide HCl  10 mg Oral QID (AC & HS)    pantoprazole  40 mg Oral Daily    sevelamer carbonate  800 mg Oral TID WM    ursodiol  300 mg Oral BID     PRN Meds:sodium chloride, sodium chloride 0.9%, sodium chloride 0.9%, gentamicin, ondansetron, oxyCODONE, prochlorperazine, ramelteon, sodium chloride 0.9%     Review of Systems  Objective:     Vital Signs (Most Recent):  Temp: 99.1 °F (37.3 °C) (05/14/18 0534)  Pulse: 87 (05/14/18 0534)  Resp: 18 (05/14/18 0534)  BP: (!) 139/94 (05/14/18 0534)  SpO2: 96 % (05/14/18 0534) Vital Signs (24h Range):  Temp:  [98.5 °F (36.9 °C)-99.8 °F (37.7 °C)] 99.1 °F (37.3 °C)  Pulse:  [75-94] 87  Resp:  [16-18] 18  SpO2:  [94 %-99 %] 96 %  BP: (139-165)/() 139/94     Weight: 58.3 kg (128 lb 8.5 oz)  Body mass index is 20.74 kg/m².  Body surface area is 1.65 meters squared.      Intake/Output Summary (Last 24 hours) at 05/14/18 0712  Last data filed at 05/14/18 0530   Gross per 24 hour   Intake          1118.75 ml   Output              895 ml   Net            223.75 ml       Physical Exam  Constitutional: He is oriented to person, place, and time. He appears well-developed.   Hand and temporal muscle wasting   HENT:   Head: Normocephalic and atraumatic.   Eyes: Pupils are equal, round, and reactive to light.   Neck: Normal range of motion.   Cardiovascular: Normal rate, regular rhythm, normal heart sounds and intact distal pulses.    Pulmonary/Chest: Effort normal and breath sounds normal.   Abdominal: Soft. Bowel sounds are normal.   Musculoskeletal: Normal range of motion. He exhibits no edema.   Neurological: He is alert and oriented to person, place, and time.   Skin: Skin is warm and dry. There is pallor.   Psychiatric: He has a normal mood and affect. His behavior is normal. Judgment and thought content normal.    Significant Labs:   CBC:     Recent Labs  Lab 05/13/18  0508 05/14/18  0524   WBC 3.79* 3.81*   HGB 8.1* 7.7*   HCT 24.4* 23.2*   PLT 9*  --     and CMP:     Recent Labs  Lab 05/13/18  0508 05/14/18  0524   * 134*   K 4.0 4.2    103   CO2 23 22*   GLU 84 86   BUN 27* 35*   CREATININE 4.1* 4.5*   CALCIUM 8.7 8.8   PROT 5.9* 6.1   ALBUMIN 2.2* 2.3*   BILITOT 2.3* 2.2*   ALKPHOS 131 142*   AST 16 16   ALT <5* <5*   ANIONGAP 8 9   EGFRNONAA 18.5* 16.5*       Diagnostic Results:  I have reviewed all pertinent imaging results/findings within the past 24 hours.    Assessment/Plan:     Active Diagnoses:    Diagnosis Date Noted POA    Elevated bilirubin [R17] 05/12/2018 Yes    Other pancytopenia [D61.818] 05/12/2018 Yes    Thrombocytopenia [D69.6] 05/12/2018 Yes    Chronic kidney disease-mineral and bone disorder [N18.9, E83.9, M89.9] 05/09/2018 Yes    Anemia of chronic renal failure [N18.9, D63.1]  Yes    Hyperphosphatemia [E83.39] 04/04/2018 Yes    ESRD Monday/Friday [N18.6] 01/13/2018 Yes    Severe protein-calorie malnutrition [E43] 01/11/2018 Yes    Biliary stricture of transplanted liver [T86.49, K83.1] 12/07/2017 Yes    Hypomagnesemia  [E83.42] 11/18/2017 Yes    S/P liver transplant [Z94.4] 10/20/2017 Not Applicable     Chronic    Prophylactic immunotherapy [Z29.8] 10/20/2017 Not Applicable     Chronic    Long-term use of immunosuppressant medication [Z79.899] 10/20/2017 Not Applicable     Chronic      Problems Resolved During this Admission:    Diagnosis Date Noted Date Resolved POA    PRINCIPAL PROBLEM:  Fever [R50.9] 05/09/2018 05/11/2018 Yes    Elevated LFTs [R79.89] 04/30/2018 05/09/2018 Yes     Pancytopenia  Anemia of Chronic Renal Insufficiency  ESRD on iHD  Hx of OLT     - noted to have Hb: 8.4 g/dL with MCV 92 on admission, with Platelet count of 22,000/mm3 and WBC: 3,150/mm3 and ANC: 1,400/mm3.     - upon chart review, during his recent admission he was also noted to be anemia and required blood transfusions. His baseline Hb ranges 8-9 g/dL and Plt count 50-60,000/mm3.    - hemolysis lab work are concerning given low haptoglobin and elevated LDH.  - however, his reticulocyte count is only slightly elevated and peripheral smear did not show any significant amount of schistocytes to suggest microangiopathic hemolysis or TMA/TTP.   - direct flora negative, less likely to be autoimmune hemolytic anemia.  - [id number] resulted as 60%, which essentially rules out TTP.    - fibrinogen/coags unremarkable, less likely to be a consumptive process.   - Hb: 7.7 g/dL and Platelet count 9,000/mm3 yesterday, pending this morning. Transfused 1 unit of Platelet yesterday.     - however, reticulocyte count/LDH/total bilirubin are trending downward/stable. Majority of total bilirubin is direct. Therefore, suspicion for hemolysis remains low.   - in addition, PNH and Cold Agg Titers studies are also unremarkable.     - CMV undetectable, Parvo titers in process.   - HIT in process.   - FOBT +, work-up per primary.  - although his acute illness may have exacerbated his chronically low blood counts due to renal insufficiency and immunosuppression, will  consider bone marrow biopsy evaluation to r/o any underlying bone marrow disorder if we do not see count recovery.   - VIDA support per Nephrology.         Thank you for your consult. I will follow-up with patient. Please contact us if you have any additional questions.     Dereje Robles MD  Hematology/Oncology  [facility]      I have reviewed the notes, assessments, and/or procedures performed by the housestaff, as above.  I have personally interviewed and examined the patient at the beside, and rounded with the housestaff. I concur with her/his assessment and plan and the documentation of hJonny Diana.  I, Dr. Elliott Burdick, personally spent more than  35 mins during this encounter, greater than 50% was spent in direct counseling and/or coordination of care.     Elliott Burdick M.D., M.S., F.A.C.P.  Hematology/Oncology Attending  [facility]

## 2018-05-14 NOTE — PROGRESS NOTES
Dr Robles at the bedside for a bone marrow biopsy. Patient pre-medicated with oxycodone and valium. Patient appeared to be very uncomfortable during the procedure. After several unsuccessful attempts the MD was able to get a small sample. Patient asked the MD to stop the procedure because it was too painful. Bandage applied to the lower right back. Patient to lay flat on his back for at least 30 minutes. Patient requesting more pain medication. Seda Camacho NP notified. New orders noted.

## 2018-05-15 LAB
ADAMTS13 ACTIVITY: NORMAL %
ALBUMIN SERPL BCP-MCNC: 2.2 G/DL
ALP SERPL-CCNC: 152 U/L
ALT SERPL W/O P-5'-P-CCNC: <5 U/L
ANION GAP SERPL CALC-SCNC: 7 MMOL/L
ANISOCYTOSIS BLD QL SMEAR: SLIGHT
AST SERPL-CCNC: 15 U/L
BASOPHILS # BLD AUTO: 0.01 K/UL
BASOPHILS NFR BLD: 0.3 %
BILIRUB DIRECT SERPL-MCNC: 1.3 MG/DL
BILIRUB SERPL-MCNC: 1.8 MG/DL
BLD PROD TYP BPU: NORMAL
BLOOD UNIT EXPIRATION DATE: NORMAL
BLOOD UNIT TYPE CODE: 6200
BLOOD UNIT TYPE: NORMAL
BONE MARROW IRON STAIN COMMENT: NORMAL
BONE MARROW WRIGHT STAIN COMMENT: NORMAL
BUN SERPL-MCNC: 20 MG/DL
CALCIUM SERPL-MCNC: 8.7 MG/DL
CHLORIDE SERPL-SCNC: 104 MMOL/L
CO2 SERPL-SCNC: 24 MMOL/L
CODING SYSTEM: NORMAL
CREAT SERPL-MCNC: 3.2 MG/DL
CYCLOSPORINE BLD LC/MS/MS-MCNC: 235 NG/ML
DACRYOCYTES BLD QL SMEAR: ABNORMAL
DIFFERENTIAL METHOD: ABNORMAL
DISPENSE STATUS: NORMAL
EOSINOPHIL # BLD AUTO: 0 K/UL
EOSINOPHIL NFR BLD: 1.3 %
ERYTHROCYTE [DISTWIDTH] IN BLOOD BY AUTOMATED COUNT: 16 %
EST. GFR  (AFRICAN AMERICAN): 28.9 ML/MIN/1.73 M^2
EST. GFR  (NON AFRICAN AMERICAN): 25 ML/MIN/1.73 M^2
GLUCOSE SERPL-MCNC: 91 MG/DL
HAPTOGLOB SERPL-MCNC: <10 MG/DL
HCT VFR BLD AUTO: 21.9 %
HGB BLD-MCNC: 7.1 G/DL
IMM GRANULOCYTES # BLD AUTO: 0.02 K/UL
IMM GRANULOCYTES NFR BLD AUTO: 0.7 %
INR PPP: 1
INTERLEUKIN 2 (IL-2) RECEPTOR: 2149 PG/ML
LDH SERPL L TO P-CCNC: 460 U/L
LYMPHOCYTES # BLD AUTO: 0.9 K/UL
LYMPHOCYTES NFR BLD: 28.5 %
MAGNESIUM SERPL-MCNC: 1.5 MG/DL
MCH RBC QN AUTO: 30.7 PG
MCHC RBC AUTO-ENTMCNC: 32.4 G/DL
MCV RBC AUTO: 95 FL
MONOCYTES # BLD AUTO: 0.3 K/UL
MONOCYTES NFR BLD: 10.8 %
NEUTROPHILS # BLD AUTO: 1.8 K/UL
NEUTROPHILS NFR BLD: 58.4 %
NRBC BLD-RTO: 0 /100 WBC
OVALOCYTES BLD QL SMEAR: ABNORMAL
PATH REV BLD -IMP: NORMAL
PATH REV BLD -IMP: NORMAL
PHOSPHATE SERPL-MCNC: 2.6 MG/DL
PLATELET # BLD AUTO: 4 K/UL
PLATELET BLD QL SMEAR: ABNORMAL
PMV BLD AUTO: ABNORMAL FL
POIKILOCYTOSIS BLD QL SMEAR: SLIGHT
POLYCHROMASIA BLD QL SMEAR: ABNORMAL
POTASSIUM SERPL-SCNC: 3.8 MMOL/L
PROT SERPL-MCNC: 6 G/DL
PROTHROMBIN TIME: 10.4 SEC
RBC # BLD AUTO: 2.31 M/UL
RETICS/RBC NFR AUTO: 3.9 %
SCHISTOCYTES BLD QL SMEAR: ABNORMAL
SODIUM SERPL-SCNC: 135 MMOL/L
TRANS PLATPHERESIS VOL PATIENT: NORMAL ML
WBC # BLD AUTO: 3.05 K/UL

## 2018-05-15 PROCEDURE — 85045 AUTOMATED RETICULOCYTE COUNT: CPT | Mod: NTX

## 2018-05-15 PROCEDURE — 85610 PROTHROMBIN TIME: CPT | Mod: NTX

## 2018-05-15 PROCEDURE — 25000003 PHARM REV CODE 250: Mod: NTX | Performed by: NURSE PRACTITIONER

## 2018-05-15 PROCEDURE — 25000003 PHARM REV CODE 250: Mod: NTX | Performed by: PHYSICIAN ASSISTANT

## 2018-05-15 PROCEDURE — 99233 SBSQ HOSP IP/OBS HIGH 50: CPT | Mod: NTX,,, | Performed by: NURSE PRACTITIONER

## 2018-05-15 PROCEDURE — 85025 COMPLETE CBC W/AUTO DIFF WBC: CPT | Mod: NTX

## 2018-05-15 PROCEDURE — 80053 COMPREHEN METABOLIC PANEL: CPT | Mod: NTX

## 2018-05-15 PROCEDURE — 20600001 HC STEP DOWN PRIVATE ROOM: Mod: NTX

## 2018-05-15 PROCEDURE — 84165 PROTEIN E-PHORESIS SERUM: CPT | Mod: 26,NTX,, | Performed by: PATHOLOGY

## 2018-05-15 PROCEDURE — 80158 DRUG ASSAY CYCLOSPORINE: CPT | Mod: NTX

## 2018-05-15 PROCEDURE — 63600175 PHARM REV CODE 636 W HCPCS: Mod: NTX | Performed by: STUDENT IN AN ORGANIZED HEALTH CARE EDUCATION/TRAINING PROGRAM

## 2018-05-15 PROCEDURE — 25000003 PHARM REV CODE 250: Mod: NTX | Performed by: STUDENT IN AN ORGANIZED HEALTH CARE EDUCATION/TRAINING PROGRAM

## 2018-05-15 PROCEDURE — 84100 ASSAY OF PHOSPHORUS: CPT | Mod: NTX

## 2018-05-15 PROCEDURE — 82248 BILIRUBIN DIRECT: CPT | Mod: NTX

## 2018-05-15 PROCEDURE — 86334 IMMUNOFIX E-PHORESIS SERUM: CPT | Mod: 26,NTX,, | Performed by: PATHOLOGY

## 2018-05-15 PROCEDURE — 99233 SBSQ HOSP IP/OBS HIGH 50: CPT | Mod: NTX,,, | Performed by: INTERNAL MEDICINE

## 2018-05-15 PROCEDURE — 25500020 PHARM REV CODE 255: Mod: NTX | Performed by: STUDENT IN AN ORGANIZED HEALTH CARE EDUCATION/TRAINING PROGRAM

## 2018-05-15 PROCEDURE — 82525 ASSAY OF COPPER: CPT | Mod: NTX

## 2018-05-15 PROCEDURE — 83010 ASSAY OF HAPTOGLOBIN QUANT: CPT | Mod: NTX

## 2018-05-15 PROCEDURE — P9035 PLATELET PHERES LEUKOREDUCED: HCPCS | Mod: NTX

## 2018-05-15 PROCEDURE — 83735 ASSAY OF MAGNESIUM: CPT | Mod: NTX

## 2018-05-15 PROCEDURE — 83615 LACTATE (LD) (LDH) ENZYME: CPT | Mod: NTX

## 2018-05-15 PROCEDURE — 36430 TRANSFUSION BLD/BLD COMPNT: CPT | Mod: NTX

## 2018-05-15 PROCEDURE — 85060 BLOOD SMEAR INTERPRETATION: CPT | Mod: NTX,,, | Performed by: PATHOLOGY

## 2018-05-15 PROCEDURE — 36415 COLL VENOUS BLD VENIPUNCTURE: CPT | Mod: NTX

## 2018-05-15 PROCEDURE — 86334 IMMUNOFIX E-PHORESIS SERUM: CPT | Mod: NTX

## 2018-05-15 PROCEDURE — 84165 PROTEIN E-PHORESIS SERUM: CPT | Mod: NTX

## 2018-05-15 RX ORDER — HYDROCODONE BITARTRATE AND ACETAMINOPHEN 500; 5 MG/1; MG/1
TABLET ORAL
Status: DISCONTINUED | OUTPATIENT
Start: 2018-05-15 | End: 2018-05-16

## 2018-05-15 RX ORDER — HYDROMORPHONE HYDROCHLORIDE 1 MG/ML
0.5 INJECTION, SOLUTION INTRAMUSCULAR; INTRAVENOUS; SUBCUTANEOUS ONCE
Status: COMPLETED | OUTPATIENT
Start: 2018-05-15 | End: 2018-05-15

## 2018-05-15 RX ADMIN — IOHEXOL 15 ML: 350 INJECTION, SOLUTION INTRAVENOUS at 10:05

## 2018-05-15 RX ADMIN — OXYCODONE HYDROCHLORIDE 10 MG: 5 TABLET ORAL at 06:05

## 2018-05-15 RX ADMIN — OXYCODONE HYDROCHLORIDE 10 MG: 5 TABLET ORAL at 09:05

## 2018-05-15 RX ADMIN — OXYCODONE HYDROCHLORIDE 10 MG: 5 TABLET ORAL at 11:05

## 2018-05-15 RX ADMIN — METOCLOPRAMIDE HYDROCHLORIDE 10 MG: 5 TABLET ORAL at 06:05

## 2018-05-15 RX ADMIN — CYCLOSPORINE 100 MG: 100 CAPSULE, LIQUID FILLED ORAL at 08:05

## 2018-05-15 RX ADMIN — HYDROMORPHONE HYDROCHLORIDE 0.5 MG: 1 INJECTION, SOLUTION INTRAMUSCULAR; INTRAVENOUS; SUBCUTANEOUS at 07:05

## 2018-05-15 RX ADMIN — SEVELAMER CARBONATE 800 MG: 800 TABLET, FILM COATED ORAL at 08:05

## 2018-05-15 RX ADMIN — URSODIOL 300 MG: 300 CAPSULE ORAL at 08:05

## 2018-05-15 RX ADMIN — AMLODIPINE BESYLATE 5 MG: 5 TABLET ORAL at 08:05

## 2018-05-15 RX ADMIN — SEVELAMER CARBONATE 800 MG: 800 TABLET, FILM COATED ORAL at 06:05

## 2018-05-15 RX ADMIN — DRONABINOL 2.5 MG: 2.5 CAPSULE ORAL at 08:05

## 2018-05-15 RX ADMIN — LEVETIRACETAM 500 MG: 500 TABLET ORAL at 08:05

## 2018-05-15 RX ADMIN — METOCLOPRAMIDE HYDROCHLORIDE 10 MG: 5 TABLET ORAL at 08:05

## 2018-05-15 RX ADMIN — LEVOTHYROXINE SODIUM 75 MCG: 25 TABLET ORAL at 06:05

## 2018-05-15 RX ADMIN — BISACODYL 10 MG: 5 TABLET, COATED ORAL at 08:05

## 2018-05-15 RX ADMIN — PANTOPRAZOLE SODIUM 40 MG: 40 TABLET, DELAYED RELEASE ORAL at 08:05

## 2018-05-15 RX ADMIN — OXYCODONE HYDROCHLORIDE 10 MG: 5 TABLET ORAL at 03:05

## 2018-05-15 RX ADMIN — OXYCODONE HYDROCHLORIDE 10 MG: 5 TABLET ORAL at 01:05

## 2018-05-15 NOTE — PROGRESS NOTES
Shereen Mcugire RN notified pt w/ critical platelet 4,000.  No new orders given at this time.  Plan to transfuse platelets.  Will await order to be placed. Will monitor.

## 2018-05-15 NOTE — PLAN OF CARE
Problem: Patient Care Overview  Goal: Plan of Care Review  Outcome: Ongoing (interventions implemented as appropriate)  Pt AAOx4, VSS, tmax 100.  C/o pain to biopsy site requiring one time dose IV fentanyl and around the clock PO oxycodone Q4H.  Pt affect appears to be very flat.  0BM overnight 250cc urine.  Mother remain at pt bedside.  Fall risk precautions initiated.  Pt in lowest bed position setting, lighting adjusted, pt to wear nonskid socks when ambulating, side rails up x2.  Pt remain free from falls during shift.  Call light within reach.  Will continue to monitor.

## 2018-05-15 NOTE — PROGRESS NOTES
[facility]  Hematology/Oncology  Progress Note    Patient Name: Jhonny Diana  Admission Date: 5/8/2018  Hospital Length of Stay: 6 days  Code Status: Full Code     Subjective:     Interval History:     - remains afebrile this morning, low grade of 100 F at 8 pm.   - Hb: 7.1 g/dL and Platelet count pending this morning.  - Transfused 1 unit of Platelet count 4,000/mm3.    - chronic abdominal pain, controlled with current regimen.   - underwent bone marrow procedure, unsuccessful due to pain.     Medications:  Continuous Infusions:  Scheduled Meds:   amLODIPine  5 mg Oral Daily    bisacodyl  10 mg Oral Daily    cycloSPORINE modified (NEORAL)  100 mg Oral BID    dronabinol  2.5 mg Oral QHS    epoetin maurisio (PROCRIT) injection  20,000 Units Intravenous Every Mon, Fri    ergocalciferol  50,000 Units Oral Q7 Days    fentaNYL  25 mcg Intravenous Once    levETIRAcetam  500 mg Oral BID    levothyroxine  75 mcg Oral Before breakfast    metoclopramide HCl  10 mg Oral QID (AC & HS)    pantoprazole  40 mg Oral Daily    sevelamer carbonate  800 mg Oral TID WM    ursodiol  300 mg Oral BID     PRN Meds:sodium chloride, sodium chloride, sodium chloride 0.9%, sodium chloride 0.9%, gentamicin, ondansetron, oxyCODONE, prochlorperazine, ramelteon, sodium chloride 0.9%     Review of Systems  Objective:     Vital Signs (Most Recent):  Temp: 99.6 °F (37.6 °C) (05/15/18 0359)  Pulse: 96 (05/15/18 0359)  Resp: 16 (05/15/18 0359)  BP: (!) 145/86 (05/15/18 0359)  SpO2: 96 % (05/15/18 0359) Vital Signs (24h Range):  Temp:  [98.3 °F (36.8 °C)-100 °F (37.8 °C)] 99.6 °F (37.6 °C)  Pulse:  [] 96  Resp:  [16-18] 16  SpO2:  [96 %-99 %] 96 %  BP: (129-155)/() 145/86     Weight: 56.7 kg (125 lb)  Body mass index is 20.18 kg/m².  Body surface area is 1.62 meters squared.      Intake/Output Summary (Last 24 hours) at 05/15/18 0701  Last data filed at 05/15/18 0400   Gross per 24 hour   Intake          1591.25 ml    Output             2070 ml   Net          -478.75 ml       Physical Exam  Constitutional: He is oriented to person, place, and time. He appears well-developed.   Hand and temporal muscle wasting   HENT:   Head: Normocephalic and atraumatic.   Eyes: Pupils are equal, round, and reactive to light.   Neck: Normal range of motion.   Cardiovascular: Normal rate, regular rhythm, normal heart sounds and intact distal pulses.    Pulmonary/Chest: Effort normal and breath sounds normal.   Abdominal: Soft. Bowel sounds are normal.   Musculoskeletal: Normal range of motion. He exhibits no edema.   Neurological: He is alert and oriented to person, place, and time.   Skin: Skin is warm and dry. There is pallor. Bandage in place over posterior iliac creat, no significant ecchymosis.    Psychiatric: He has a normal mood and affect. His behavior is normal. Judgment and thought content normal.    Significant Labs:   CBC:     Recent Labs  Lab 05/14/18  0524 05/15/18  0436   WBC 3.81* 3.05*   HGB 7.7* 7.1*   HCT 23.2* 21.9*   PLT 6*  --     and CMP:     Recent Labs  Lab 05/14/18  0524   *   K 4.2      CO2 22*   GLU 86   BUN 35*   CREATININE 4.5*   CALCIUM 8.8   PROT 6.1   ALBUMIN 2.3*   BILITOT 2.2*   ALKPHOS 142*   AST 16   ALT <5*   ANIONGAP 9   EGFRNONAA 16.5*       Diagnostic Results:  I have reviewed all pertinent imaging results/findings within the past 24 hours.    Assessment/Plan:     Active Diagnoses:    Diagnosis Date Noted POA    Elevated bilirubin [R17] 05/12/2018 Yes    Other pancytopenia [D61.818] 05/12/2018 Yes    Thrombocytopenia [D69.6] 05/12/2018 Yes    Chronic kidney disease-mineral and bone disorder [N18.9, E83.9, M89.9] 05/09/2018 Yes    Anemia of chronic renal failure [N18.9, D63.1]  Yes    Hyperphosphatemia [E83.39] 04/04/2018 Yes    ESRD Monday/Friday [N18.6] 01/13/2018 Yes    Severe protein-calorie malnutrition [E43] 01/11/2018 Yes    Biliary stricture of transplanted liver [T86.49,  K83.1] 12/07/2017 Yes    Hypomagnesemia [E83.42] 11/18/2017 Yes    S/P liver transplant [Z94.4] 10/20/2017 Not Applicable     Chronic    Prophylactic immunotherapy [Z29.8] 10/20/2017 Not Applicable     Chronic    Long-term use of immunosuppressant medication [Z79.899] 10/20/2017 Not Applicable     Chronic      Problems Resolved During this Admission:    Diagnosis Date Noted Date Resolved POA    PRINCIPAL PROBLEM:  Fever [R50.9] 05/09/2018 05/11/2018 Yes    Elevated LFTs [R79.89] 04/30/2018 05/09/2018 Yes     Pancytopenia  Anemia of Chronic Renal Insufficiency  ESRD on iHD  Hx of OLT     - noted to have Hb: 8.4 g/dL with MCV 92 on admission, with Platelet count of 22,000/mm3 and WBC: 3,150/mm3 and ANC: 1,400/mm3.     - upon chart review, during his recent admission he was also noted to be anemia and required blood transfusions. His baseline Hb ranges 8-9 g/dL and Plt count 50-60,000/mm3.    - hemolysis lab work are concerning given low haptoglobin and elevated reti, LDH.  - direct flora negative, less likely to be autoimmune hemolytic anemia.  - [id number] resulted as 60%, which essentially rules out TTP.    - fibrinogen/coags unremarkable, less likely to be a consumptive process.   - in addition, PNH and Cold Agg Titers studies are also unremarkable.     - CMV and Parvo undetectable, EBV titers in process.   - HIT negative.   - FOBT +, work-up per primary.  - Hb: 7.1 g/dL and Platelet count 4,000/mm3 this morning. Transfused 1 unit of Platelet yesterday.    - although his acute illness may have exacerbated his chronically low blood counts due to renal insufficiency and immunosuppression, bone marrow biopsy was considered to r/o any underlying bone marrow disorder.   - However, procedure was aborted due to pain. Able to obtain small biopsy with touch preps. Will f/u results.   - will send off SPEP  - IL-2 receptor/triglyercides have been sent as well ?HLH, although does not have any fevers and only meets two  criteria thus far (cytopenias and elevated ferritin).    - peripheral smear reviewed today, does have rare schistocytes, but not 5-6 per high power field.     - although the reticulocyte count/LDH trending down now and no significant indirect bilirubin, but cytopenias persists and haptoglobin remains persistently low. Likely there is component of thrombotic microangiopathy or cytopenias related to Cyclosporine. Consider dose adjustment or alternative therapy.     - consider trial of Prednisone to 1 mg/kg daily. Less than 5% of patients can have flora negative hemolysis.   - place him on folic acid 1 mg daily.   - will review his case in Hematology Path Conference for any input.   - VIDA support per Nephrology.  - transfuse if Hb < 7 g/dL and Platelets < 10,000/mm3 or active bleed.       Thank you for your consult. I will follow-up with patient. Please contact us if you have any additional questions.     Dereje Robles MD  Hematology/Oncology  [facility]      I have reviewed the notes, assessments, and/or procedures performed by the housestaff, as above.  I have personally interviewed and examined the patient at the beside, and rounded with the housestaff. I concur with her/his assessment and plan and the documentation of Jhonny Diana.      Consider discontinuing or reducing dose of cyclosporin.  Alternatively, may consider a course of steroids.     I, Dr. Elliott Burdick, personally spent more than  35 mins during this encounter, greater than 50% was spent in direct counseling and/or coordination of care.     Elliott Burdick M.D., M.S., F.A.C.P.  Hematology/Oncology Attending  [facility]

## 2018-05-15 NOTE — NURSING
Notified Transplant resident Nikaeer of pt c/o 8/10 pain to biopsy site with no relief to PO oxycodone.  Ordered to administer one time dose 25mcg IV fentanyl.  Will continue to monitor.

## 2018-05-15 NOTE — ASSESSMENT & PLAN NOTE
- s/p ERCP 5/4  - stent in place per imaging  - PBS consulted for possible ERCP - not recommending ERCP for now

## 2018-05-15 NOTE — ASSESSMENT & PLAN NOTE
- h/h chronically low  - continue epoetin  - hemolysis labs concerning - hematology consulted & following  - H/H improved s/p 1 unit of PRBC 5/12.

## 2018-05-15 NOTE — PLAN OF CARE
Problem: Patient Care Overview  Goal: Plan of Care Review  Outcome: Ongoing (interventions implemented as appropriate)  Pt AAOx4, VSS.  No TELE orders.    AM labs monitored. Critical platelet count 4000 this am despite 2 units given yesterday.  1 unit platelets transfused today given.  2nd unit ordered but held.  H&H stable 7.1/21.9.  Hematology consulted for worsening thrombocytopenia.  Bone marrow biopsy attempted at bedside yesterday without success, dry tap.  HIT negative.  CT of chest/abdomen/pelvis done this afternoon to evaluate lymph nodes today.   Pt c/o pain at bone marrow biopsy site.  Oxy 10 mg given q4h w/ moderate relief obtained.  CT of chest/abdomen/pelvis done this afternoon.    No BM since 5/13.  Pt denies feeling constipated.  Activity and oral intake encouraged.  Will monitor.    Little urine output.  Pt on HD Monday/friday.  Mother at bedside throughout the day.  Fall precautions in place.  Pt up out of bed walking unit x2 today.  Non-skid socks on feet.  Bed lowered and locked in place.  Call bell in reach.

## 2018-05-15 NOTE — PROGRESS NOTES
[facility]  Liver Transplant  Progress Note    Patient Name: Jhonny Diana  MRN: [id number]  Admission Date: 2018  Hospital Length of Stay: 6 days  Code Status: Full Code  Primary Care Provider: Primary Doctor No  Post-Operative Day: 208    ORGAN:   LIVER  Disease Etiology: Acute Alcoholic Hepatitis  Donor Type:    - Brain Death  CDC High Risk:   No  Donor CMV Status:   Donor CMV Status: Positive  Donor HBcAB:   Negative  Donor HCV Status:   Negative  Whole or Partial: Whole Liver  Biliary Anastomosis: End to End  Arterial Anatomy: Standard  Subjective:     History of Present Illness:  Mr. 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  S/p OLTX 10/19/2017 (Liver) with steroid induction. Post op course with multiple complications to include biliary stricture, FUO, ESRD, and chronic abdominal pain. Recently discharged on  after an ERCP. Reports that since discharge he has had fevers of 100.4 to 100.8. These fevers resolve on their own without medication. Also reports decreased appetite and fatigue. Nausea and abdominal pain are baseline and unchanged from prior to discharge. Normal bowel movements and passing gas. Receiving dialysis Monday and Friday. On admission, patient was afebrile and hemodynamically stable. Labs were stable from prior except tbili which is up to 3.9 from 3.2 yesterday.     Interval History: no acute events overnight. Hematology consulted for worsening thrombocytopenia, platelets transfused 2 days prior with no response, plts 4,000 this AM, transfuse 1 pack platelets today. Attempted bone marrow biopsy at bedside without success, only able to obtain small dry tap. C/o pain at biopsy site. HIT negative. CT chest/abd/pelvis to evaluate lymph nodes today. Hematology work up still in process. H/H remains low but stable today, will hold PRBC transfusion. Prealbumin 10, previously 17 a month ago, continue to encourage oral intake.    Scheduled Meds:    amLODIPine  5 mg Oral Daily    bisacodyl  10 mg Oral Daily    cycloSPORINE modified (NEORAL)  100 mg Oral BID    dronabinol  2.5 mg Oral QHS    epoetin maurisio (PROCRIT) injection  20,000 Units Intravenous Every Mon, Fri    ergocalciferol  50,000 Units Oral Q7 Days    fentaNYL  25 mcg Intravenous Once    levETIRAcetam  500 mg Oral BID    levothyroxine  75 mcg Oral Before breakfast    metoclopramide HCl  10 mg Oral QID (AC & HS)    pantoprazole  40 mg Oral Daily    sevelamer carbonate  800 mg Oral TID WM    ursodiol  300 mg Oral BID     Continuous Infusions:  PRN Meds:sodium chloride, sodium chloride, sodium chloride, sodium chloride, sodium chloride 0.9%, sodium chloride 0.9%, gentamicin, omnipaque, ondansetron, oxyCODONE, prochlorperazine, ramelteon, sodium chloride 0.9%    Review of Systems   Constitutional: Positive for activity change, appetite change and fatigue. Negative for chills and fever.   Respiratory: Negative for cough and shortness of breath.    Cardiovascular: Negative for chest pain and leg swelling.   Gastrointestinal: Positive for abdominal distention, abdominal pain, constipation, nausea and vomiting.   Genitourinary: Positive for decreased urine volume and flank pain.   Musculoskeletal: Negative for arthralgias, back pain and myalgias.   Skin: Negative for color change and wound.   Allergic/Immunologic: Positive for immunocompromised state.   Neurological: Positive for weakness. Negative for dizziness, syncope and headaches.     Objective:     Vital Signs (Most Recent):  Temp: 98.8 °F (37.1 °C) (05/15/18 1131)  Pulse: 100 (05/15/18 1131)  Resp: 18 (05/15/18 1131)  BP: (!) 149/88 (05/15/18 1131)  SpO2: 96 % (05/15/18 1131) Vital Signs (24h Range):  Temp:  [98.5 °F (36.9 °C)-100 °F (37.8 °C)] 98.8 °F (37.1 °C)  Pulse:  [] 100  Resp:  [16-18] 18  SpO2:  [95 %-99 %] 96 %  BP: (129-154)/(83-88) 149/88     Weight: 56.7 kg (125 lb)  Body mass index is 20.18 kg/m².    Intake/Output - Last  3 Shifts       05/13 0700 - 05/14 0659 05/14 0700 - 05/15 0659 05/15 0700 - 05/16 0659    P.O. 920 720 100    I.V. (mL/kg)   0 (0)    Blood 198.8 271.3     Other  600 0    Total Intake(mL/kg) 1118.8 (19.2) 1591.3 (28.1) 100 (1.8)    Urine (mL/kg/hr) 895 (0.6) 250 (0.2)     Emesis/NG output  0 (0)     Other  1820 (1.3)     Stool  0 (0)     Blood  0 (0)     Total Output 895 2070      Net +223.8 -478.8 +100           Urine Occurrence  1 x     Stool Occurrence 1 x 0 x     Emesis Occurrence  0 x           Physical Exam   Constitutional: He is oriented to person, place, and time. He appears well-developed. He is cooperative.   Hand and temporal muscle wasting   HENT:   Head: Normocephalic and atraumatic.   Eyes: Pupils are equal, round, and reactive to light.   Neck: Normal range of motion.   Cardiovascular: Normal rate, regular rhythm, normal heart sounds, intact distal pulses and normal pulses.    Pulmonary/Chest: Effort normal and breath sounds normal. No respiratory distress. He has no decreased breath sounds. He has no rales.   Abdominal: Soft. Bowel sounds are normal. There is generalized tenderness.   Musculoskeletal: Normal range of motion. He exhibits no edema.   Neurological: He is alert and oriented to person, place, and time.   Skin: Skin is warm and dry. There is pallor.   Psychiatric: He has a normal mood and affect. His behavior is normal. Judgment and thought content normal.   Nursing note and vitals reviewed.      Laboratory:  Immunosuppressants         Stop Route Frequency     cycloSPORINE modified (NEORAL) capsule 100 mg      -- Oral 2 times daily        CBC:   Recent Labs  Lab 05/15/18  0436   WBC 3.05*   RBC 2.31*   HGB 7.1*   HCT 21.9*   PLT 4*   MCV 95   MCH 30.7   MCHC 32.4     CMP:   Recent Labs  Lab 05/15/18  0436   GLU 91   CALCIUM 8.7   ALBUMIN 2.2*   PROT 6.0   *   K 3.8   CO2 24      BUN 20   CREATININE 3.2*   ALKPHOS 152*   ALT <5*   AST 15   BILITOT 1.8*     Coagulation:    Recent Labs  Lab 05/15/18  0436   INR 1.0     Labs within the past 24 hours have been reviewed.    Diagnostic Results:  I have personally reviewed all pertinent imaging studies.    Assessment/Plan:     Other pancytopenia    - Hem/onc consulted for worsening thrombocytopenia despite platelet transfusion  - attempted bone marrow biopsy 5/14 without success due to pain, able to obtain small dry sample, hem to attempt to run test on biopsy  - HIT panel negative.   - CT chest/abd/pelvis to evaluate lymph nodes  - Hem recommending prednisone, will await CT results        Anemia of chronic renal failure    - h/h chronically low  - continue epoetin  - hemolysis labs concerning - hematology consulted & following  - H/H improved s/p 1 unit of PRBC 5/12.         S/P liver transplant    - LFTs stable  - liver u/s reviewed - unremarkable  - monitor        Thrombocytopenia    - see other pancytopenia        Elevated bilirubin    - s/p ERCP 5/4        Hyperphosphatemia    - continue sevelamer carbonate  - monitor labs daily        Vitamin D deficiency    - continue ergo replacement          ESRD Monday/Friday    - nephrology following  - making some urine, however not clearing  - Monday/friday dialysis schedule        Severe protein-calorie malnutrition    - continue dronabinol for appetite stimulant  - po supplements  - appetite improving slowly  - dietary following  - prealbumin 10 on 5/14, previously 17 a month ago        Biliary stricture of transplanted liver    - s/p ERCP 5/4  - stent in place per imaging  - PBS consulted for possible ERCP - not recommending ERCP for now        Hypomagnesemia    - stable  - monitor labs daily        Long-term use of immunosuppressant medication    - See prophylactic immunosuppression        Prophylactic immunotherapy    - continue cyclosporine  - monitor cya levels daily and adjust for therapeutic dose            VTE Risk Mitigation         Ordered     IP VTE HIGH RISK PATIENT  Once       05/09/18 0044          The patients clinical status was discussed at multidisplinary rounds, involving transplant surgery, transplant medicine, pharmacy, nursing, nutrition, and social work    Discharge Planning: not stable for discharge at this time.    Shereen Mcguire DNP  Liver Transplant  [facility]

## 2018-05-15 NOTE — SUBJECTIVE & OBJECTIVE
Scheduled Meds:   amLODIPine  5 mg Oral Daily    bisacodyl  10 mg Oral Daily    cycloSPORINE modified (NEORAL)  100 mg Oral BID    dronabinol  2.5 mg Oral QHS    epoetin maurisio (PROCRIT) injection  20,000 Units Intravenous Every Mon, Fri    ergocalciferol  50,000 Units Oral Q7 Days    fentaNYL  25 mcg Intravenous Once    levETIRAcetam  500 mg Oral BID    levothyroxine  75 mcg Oral Before breakfast    metoclopramide HCl  10 mg Oral QID (AC & HS)    pantoprazole  40 mg Oral Daily    sevelamer carbonate  800 mg Oral TID WM    ursodiol  300 mg Oral BID     Continuous Infusions:  PRN Meds:sodium chloride, sodium chloride, sodium chloride, sodium chloride, sodium chloride 0.9%, sodium chloride 0.9%, gentamicin, omnipaque, ondansetron, oxyCODONE, prochlorperazine, ramelteon, sodium chloride 0.9%    Review of Systems   Constitutional: Positive for activity change, appetite change and fatigue. Negative for chills and fever.   Respiratory: Negative for cough and shortness of breath.    Cardiovascular: Negative for chest pain and leg swelling.   Gastrointestinal: Positive for abdominal distention, abdominal pain, constipation, nausea and vomiting.   Genitourinary: Positive for decreased urine volume and flank pain.   Musculoskeletal: Negative for arthralgias, back pain and myalgias.   Skin: Negative for color change and wound.   Allergic/Immunologic: Positive for immunocompromised state.   Neurological: Positive for weakness. Negative for dizziness, syncope and headaches.     Objective:     Vital Signs (Most Recent):  Temp: 98.8 °F (37.1 °C) (05/15/18 1131)  Pulse: 100 (05/15/18 1131)  Resp: 18 (05/15/18 1131)  BP: (!) 149/88 (05/15/18 1131)  SpO2: 96 % (05/15/18 1131) Vital Signs (24h Range):  Temp:  [98.5 °F (36.9 °C)-100 °F (37.8 °C)] 98.8 °F (37.1 °C)  Pulse:  [] 100  Resp:  [16-18] 18  SpO2:  [95 %-99 %] 96 %  BP: (129-154)/(83-88) 149/88     Weight: 56.7 kg (125 lb)  Body mass index is 20.18  kg/m².    Intake/Output - Last 3 Shifts       05/13 0700 - 05/14 0659 05/14 0700 - 05/15 0659 05/15 0700 - 05/16 0659    P.O. 920 720 100    I.V. (mL/kg)   0 (0)    Blood 198.8 271.3     Other  600 0    Total Intake(mL/kg) 1118.8 (19.2) 1591.3 (28.1) 100 (1.8)    Urine (mL/kg/hr) 895 (0.6) 250 (0.2)     Emesis/NG output  0 (0)     Other  1820 (1.3)     Stool  0 (0)     Blood  0 (0)     Total Output 895 2070      Net +223.8 -478.8 +100           Urine Occurrence  1 x     Stool Occurrence 1 x 0 x     Emesis Occurrence  0 x           Physical Exam   Constitutional: He is oriented to person, place, and time. He appears well-developed. He is cooperative.   Hand and temporal muscle wasting   HENT:   Head: Normocephalic and atraumatic.   Eyes: Pupils are equal, round, and reactive to light.   Neck: Normal range of motion.   Cardiovascular: Normal rate, regular rhythm, normal heart sounds, intact distal pulses and normal pulses.    Pulmonary/Chest: Effort normal and breath sounds normal. No respiratory distress. He has no decreased breath sounds. He has no rales.   Abdominal: Soft. Bowel sounds are normal. There is generalized tenderness.   Musculoskeletal: Normal range of motion. He exhibits no edema.   Neurological: He is alert and oriented to person, place, and time.   Skin: Skin is warm and dry. There is pallor.   Psychiatric: He has a normal mood and affect. His behavior is normal. Judgment and thought content normal.   Nursing note and vitals reviewed.      Laboratory:  Immunosuppressants         Stop Route Frequency     cycloSPORINE modified (NEORAL) capsule 100 mg      -- Oral 2 times daily        CBC:   Recent Labs  Lab 05/15/18  0436   WBC 3.05*   RBC 2.31*   HGB 7.1*   HCT 21.9*   PLT 4*   MCV 95   MCH 30.7   MCHC 32.4     CMP:   Recent Labs  Lab 05/15/18  0436   GLU 91   CALCIUM 8.7   ALBUMIN 2.2*   PROT 6.0   *   K 3.8   CO2 24      BUN 20   CREATININE 3.2*   ALKPHOS 152*   ALT <5*   AST 15    BILITOT 1.8*     Coagulation:   Recent Labs  Lab 05/15/18  0436   INR 1.0     Labs within the past 24 hours have been reviewed.    Diagnostic Results:  I have personally reviewed all pertinent imaging studies.

## 2018-05-15 NOTE — ASSESSMENT & PLAN NOTE
- Hem/onc consulted for worsening thrombocytopenia despite platelet transfusion  - attempted bone marrow biopsy 5/14 without success due to pain, able to obtain small dry sample, hem to attempt to run test on biopsy  - HIT panel negative.   - CT chest/abd/pelvis to evaluate lymph nodes  - Hem recommending prednisone, will await CT results

## 2018-05-16 LAB
ALBUMIN SERPL BCP-MCNC: 2.3 G/DL
ALBUMIN SERPL ELPH-MCNC: 2.92 G/DL
ALP SERPL-CCNC: 147 U/L
ALPHA1 GLOB SERPL ELPH-MCNC: 0.45 G/DL
ALPHA2 GLOB SERPL ELPH-MCNC: 0.48 G/DL
ALT SERPL W/O P-5'-P-CCNC: <5 U/L
ANION GAP SERPL CALC-SCNC: 9 MMOL/L
AST SERPL-CCNC: 14 U/L
B-GLOBULIN SERPL ELPH-MCNC: 0.65 G/DL
BASOPHILS # BLD AUTO: 0.02 K/UL
BASOPHILS NFR BLD: 0.9 %
BILIRUB SERPL-MCNC: 1.7 MG/DL
BLD PROD TYP BPU: NORMAL
BLOOD UNIT EXPIRATION DATE: NORMAL
BLOOD UNIT TYPE CODE: 5100
BLOOD UNIT TYPE: NORMAL
BUN SERPL-MCNC: 31 MG/DL
CALCIUM SERPL-MCNC: 8.7 MG/DL
CHLORIDE SERPL-SCNC: 103 MMOL/L
CO2 SERPL-SCNC: 23 MMOL/L
CODING SYSTEM: NORMAL
CREAT SERPL-MCNC: 4.2 MG/DL
CYCLOSPORINE BLD LC/MS/MS-MCNC: 202 NG/ML
DIFFERENTIAL METHOD: ABNORMAL
DISPENSE STATUS: NORMAL
EOSINOPHIL # BLD AUTO: 0 K/UL
EOSINOPHIL NFR BLD: 1.8 %
ERYTHROCYTE [DISTWIDTH] IN BLOOD BY AUTOMATED COUNT: 15.7 %
EST. GFR  (AFRICAN AMERICAN): 20.8 ML/MIN/1.73 M^2
EST. GFR  (NON AFRICAN AMERICAN): 18 ML/MIN/1.73 M^2
GAMMA GLOB SERPL ELPH-MCNC: 1.31 G/DL
GLUCOSE SERPL-MCNC: 83 MG/DL
HCT VFR BLD AUTO: 19.6 %
HGB BLD-MCNC: 6.3 G/DL
IMM GRANULOCYTES # BLD AUTO: 0.01 K/UL
IMM GRANULOCYTES NFR BLD AUTO: 0.4 %
INR PPP: 1
LYMPHOCYTES # BLD AUTO: 0.9 K/UL
LYMPHOCYTES NFR BLD: 39 %
MAGNESIUM SERPL-MCNC: 1.6 MG/DL
MCH RBC QN AUTO: 30.6 PG
MCHC RBC AUTO-ENTMCNC: 32.1 G/DL
MCV RBC AUTO: 95 FL
MONOCYTES # BLD AUTO: 0.3 K/UL
MONOCYTES NFR BLD: 11.2 %
NEUTROPHILS # BLD AUTO: 1 K/UL
NEUTROPHILS NFR BLD: 46.7 %
NRBC BLD-RTO: 0 /100 WBC
NUM UNITS TRANS PACKED RBC: NORMAL
PATHOLOGIST INTERPRETATION SPE: NORMAL
PHOSPHATE SERPL-MCNC: 4 MG/DL
PLATELET # BLD AUTO: 4 K/UL
PMV BLD AUTO: ABNORMAL FL
POTASSIUM SERPL-SCNC: 4.2 MMOL/L
PROT SERPL-MCNC: 5.8 G/DL
PROT SERPL-MCNC: 6.1 G/DL
PROTHROMBIN TIME: 10.4 SEC
RBC # BLD AUTO: 2.06 M/UL
RETICS/RBC NFR AUTO: 3.1 %
SODIUM SERPL-SCNC: 135 MMOL/L
TRIGL SERPL-MCNC: 175 MG/DL
WBC # BLD AUTO: 2.23 K/UL

## 2018-05-16 PROCEDURE — 85025 COMPLETE CBC W/AUTO DIFF WBC: CPT | Mod: NTX

## 2018-05-16 PROCEDURE — 20600001 HC STEP DOWN PRIVATE ROOM: Mod: NTX

## 2018-05-16 PROCEDURE — 83735 ASSAY OF MAGNESIUM: CPT | Mod: NTX

## 2018-05-16 PROCEDURE — 84478 ASSAY OF TRIGLYCERIDES: CPT | Mod: NTX

## 2018-05-16 PROCEDURE — 99233 SBSQ HOSP IP/OBS HIGH 50: CPT | Mod: NTX,,, | Performed by: NURSE PRACTITIONER

## 2018-05-16 PROCEDURE — P9016 RBC LEUKOCYTES REDUCED: HCPCS | Mod: NTX

## 2018-05-16 PROCEDURE — 85610 PROTHROMBIN TIME: CPT | Mod: NTX

## 2018-05-16 PROCEDURE — 25000003 PHARM REV CODE 250: Mod: NTX | Performed by: STUDENT IN AN ORGANIZED HEALTH CARE EDUCATION/TRAINING PROGRAM

## 2018-05-16 PROCEDURE — 25000003 PHARM REV CODE 250: Mod: NTX | Performed by: PHYSICIAN ASSISTANT

## 2018-05-16 PROCEDURE — 25000003 PHARM REV CODE 250: Mod: NTX | Performed by: NURSE PRACTITIONER

## 2018-05-16 PROCEDURE — 85045 AUTOMATED RETICULOCYTE COUNT: CPT | Mod: NTX

## 2018-05-16 PROCEDURE — 80053 COMPREHEN METABOLIC PANEL: CPT | Mod: NTX

## 2018-05-16 PROCEDURE — 36415 COLL VENOUS BLD VENIPUNCTURE: CPT | Mod: NTX

## 2018-05-16 PROCEDURE — 80158 DRUG ASSAY CYCLOSPORINE: CPT | Mod: NTX

## 2018-05-16 PROCEDURE — 63600175 PHARM REV CODE 636 W HCPCS: Mod: NTX | Performed by: STUDENT IN AN ORGANIZED HEALTH CARE EDUCATION/TRAINING PROGRAM

## 2018-05-16 PROCEDURE — 63600175 PHARM REV CODE 636 W HCPCS: Mod: NTX | Performed by: NURSE PRACTITIONER

## 2018-05-16 PROCEDURE — 84100 ASSAY OF PHOSPHORUS: CPT | Mod: NTX

## 2018-05-16 RX ORDER — HYDROCODONE BITARTRATE AND ACETAMINOPHEN 500; 5 MG/1; MG/1
TABLET ORAL
Status: DISCONTINUED | OUTPATIENT
Start: 2018-05-16 | End: 2018-05-19

## 2018-05-16 RX ORDER — HYDROMORPHONE HYDROCHLORIDE 1 MG/ML
0.5 INJECTION, SOLUTION INTRAMUSCULAR; INTRAVENOUS; SUBCUTANEOUS ONCE
Status: COMPLETED | OUTPATIENT
Start: 2018-05-16 | End: 2018-05-16

## 2018-05-16 RX ORDER — FAMOTIDINE 20 MG/1
20 TABLET, FILM COATED ORAL NIGHTLY
Status: DISCONTINUED | OUTPATIENT
Start: 2018-05-16 | End: 2018-05-23 | Stop reason: HOSPADM

## 2018-05-16 RX ADMIN — DRONABINOL 2.5 MG: 2.5 CAPSULE ORAL at 09:05

## 2018-05-16 RX ADMIN — BISACODYL 10 MG: 5 TABLET, COATED ORAL at 08:05

## 2018-05-16 RX ADMIN — OXYCODONE HYDROCHLORIDE 10 MG: 5 TABLET ORAL at 10:05

## 2018-05-16 RX ADMIN — PANTOPRAZOLE SODIUM 40 MG: 40 TABLET, DELAYED RELEASE ORAL at 08:05

## 2018-05-16 RX ADMIN — LEVOTHYROXINE SODIUM 75 MCG: 25 TABLET ORAL at 06:05

## 2018-05-16 RX ADMIN — LEVETIRACETAM 500 MG: 500 TABLET ORAL at 08:05

## 2018-05-16 RX ADMIN — OXYCODONE HYDROCHLORIDE 10 MG: 5 TABLET ORAL at 02:05

## 2018-05-16 RX ADMIN — HYDROMORPHONE HYDROCHLORIDE 0.5 MG: 1 INJECTION, SOLUTION INTRAMUSCULAR; INTRAVENOUS; SUBCUTANEOUS at 02:05

## 2018-05-16 RX ADMIN — OXYCODONE HYDROCHLORIDE 10 MG: 5 TABLET ORAL at 08:05

## 2018-05-16 RX ADMIN — LEVETIRACETAM 500 MG: 500 TABLET ORAL at 09:05

## 2018-05-16 RX ADMIN — CYCLOSPORINE 100 MG: 100 CAPSULE, LIQUID FILLED ORAL at 08:05

## 2018-05-16 RX ADMIN — FAMOTIDINE 20 MG: 20 TABLET ORAL at 09:05

## 2018-05-16 RX ADMIN — METOCLOPRAMIDE HYDROCHLORIDE 10 MG: 5 TABLET ORAL at 06:05

## 2018-05-16 RX ADMIN — METOCLOPRAMIDE HYDROCHLORIDE 10 MG: 5 TABLET ORAL at 11:05

## 2018-05-16 RX ADMIN — AMLODIPINE BESYLATE 5 MG: 5 TABLET ORAL at 08:05

## 2018-05-16 RX ADMIN — URSODIOL 300 MG: 300 CAPSULE ORAL at 08:05

## 2018-05-16 RX ADMIN — URSODIOL 300 MG: 300 CAPSULE ORAL at 09:05

## 2018-05-16 RX ADMIN — SEVELAMER CARBONATE 800 MG: 800 TABLET, FILM COATED ORAL at 08:05

## 2018-05-16 RX ADMIN — SEVELAMER CARBONATE 800 MG: 800 TABLET, FILM COATED ORAL at 11:05

## 2018-05-16 RX ADMIN — OXYCODONE HYDROCHLORIDE 10 MG: 5 TABLET ORAL at 06:05

## 2018-05-16 RX ADMIN — METOCLOPRAMIDE HYDROCHLORIDE 10 MG: 5 TABLET ORAL at 09:05

## 2018-05-16 RX ADMIN — HYDROCORTISONE SODIUM SUCCINATE 100 MG: 100 INJECTION, POWDER, FOR SOLUTION INTRAMUSCULAR; INTRAVENOUS at 09:05

## 2018-05-16 RX ADMIN — HYDROCORTISONE SODIUM SUCCINATE 100 MG: 100 INJECTION, POWDER, FOR SOLUTION INTRAMUSCULAR; INTRAVENOUS at 03:05

## 2018-05-16 NOTE — CARE UPDATE
Plt count significantly low. CT scan shows no evidence to suggest PTLD. Would consider steroids for possible ITP and considering lowe dosing of cyclosporine for possible TMA. Attendings previously discussed these recs. Please call with questions.     Isis Escoto MD   Pager [phone]

## 2018-05-16 NOTE — SUBJECTIVE & OBJECTIVE
Scheduled Meds:   amLODIPine  5 mg Oral Daily    bisacodyl  10 mg Oral Daily    dronabinol  2.5 mg Oral QHS    epoetin maurisio (PROCRIT) injection  20,000 Units Intravenous Every Mon, Fri    ergocalciferol  50,000 Units Oral Q7 Days    famotidine  20 mg Oral QHS    hydrocortisone sodium succinate  100 mg Intravenous Q8H    levETIRAcetam  500 mg Oral BID    levothyroxine  75 mcg Oral Before breakfast    metoclopramide HCl  10 mg Oral QID (AC & HS)    sevelamer carbonate  800 mg Oral TID WM    ursodiol  300 mg Oral BID     Continuous Infusions:  PRN Meds:sodium chloride, sodium chloride 0.9%, sodium chloride 0.9%, gentamicin, omnipaque, ondansetron, oxyCODONE, prochlorperazine, ramelteon, sodium chloride 0.9%    Review of Systems   Constitutional: Positive for activity change, appetite change and fatigue. Negative for chills and fever.   Respiratory: Negative for cough and shortness of breath.    Cardiovascular: Negative for chest pain and leg swelling.   Gastrointestinal: Positive for abdominal distention, abdominal pain, constipation, nausea and vomiting.   Genitourinary: Positive for decreased urine volume and flank pain.   Musculoskeletal: Negative for arthralgias, back pain and myalgias.   Skin: Negative for color change and wound.   Allergic/Immunologic: Positive for immunocompromised state.   Neurological: Positive for weakness. Negative for dizziness, syncope and headaches.     Objective:     Vital Signs (Most Recent):  Temp: 98.5 °F (36.9 °C) (05/16/18 1134)  Pulse: 71 (05/16/18 1134)  Resp: 18 (05/16/18 1134)  BP: 135/86 (05/16/18 1134)  SpO2: 97 % (05/16/18 1134) Vital Signs (24h Range):  Temp:  [97.9 °F (36.6 °C)-99.4 °F (37.4 °C)] 98.5 °F (36.9 °C)  Pulse:  [71-98] 71  Resp:  [16-20] 18  SpO2:  [96 %-100 %] 97 %  BP: (126-144)/(86-92) 135/86     Weight: 59.7 kg (131 lb 9.8 oz)  Body mass index is 21.24 kg/m².    Intake/Output - Last 3 Shifts       05/14 0700 - 05/15 0659 05/15 0700 - 05/16 0659  05/16 0700 - 05/17 0659    P.O. 720 220     I.V. (mL/kg)  0 (0)     Blood 271.3 31.3     Other 600 0     Total Intake(mL/kg) 1591.3 (28.1) 251.3 (4.2)     Urine (mL/kg/hr) 250 (0.2)      Emesis/NG output 0 (0)      Other 1820 (1.3)      Stool 0 (0)      Blood 0 (0)      Total Output 2070        Net -478.8 +251.3             Urine Occurrence 1 x 0 x     Stool Occurrence 0 x 0 x     Emesis Occurrence 0 x            Physical Exam   Constitutional: He is oriented to person, place, and time. He appears well-developed. He is cooperative.   Hand and temporal muscle wasting   HENT:   Head: Normocephalic and atraumatic.   Eyes: Pupils are equal, round, and reactive to light.   Neck: Normal range of motion.   Cardiovascular: Normal rate, regular rhythm, normal heart sounds, intact distal pulses and normal pulses.    Pulmonary/Chest: Effort normal. No respiratory distress. He has decreased breath sounds in the right lower field and the left lower field. He has no rales.   Abdominal: Soft. Bowel sounds are normal. He exhibits distension. There is generalized tenderness.   Musculoskeletal: Normal range of motion. He exhibits no edema.   Neurological: He is alert and oriented to person, place, and time.   Skin: Skin is warm and dry. There is pallor.   Psychiatric: He has a normal mood and affect. His behavior is normal. Judgment and thought content normal.   Nursing note and vitals reviewed.      Laboratory:  Immunosuppressants     None        CBC:     Recent Labs  Lab 05/16/18  0511   WBC 2.23*   RBC 2.06*   HGB 6.3*   HCT 19.6*   PLT 4*   MCV 95   MCH 30.6   MCHC 32.1     CMP:     Recent Labs  Lab 05/16/18  0511   GLU 83   CALCIUM 8.7   ALBUMIN 2.3*   PROT 6.1   *   K 4.2   CO2 23      BUN 31*   CREATININE 4.2*   ALKPHOS 147*   ALT <5*   AST 14   BILITOT 1.7*     Coagulation:     Recent Labs  Lab 05/16/18  0511   INR 1.0     Labs within the past 24 hours have been reviewed.    Diagnostic Results:  I have personally  reviewed all pertinent imaging studies.

## 2018-05-16 NOTE — PROGRESS NOTES
"SW presented to patient for follow up and continuity of care.  SW met with the mother of the pt Sanjuanita Diana # [phone] as the pt was observed to be sleeping soundingly.  Pt coping was not assessed.  Pt's caregiver was AAOx4 and sitting on the sofa watching a movie. Sanjuanita reported adequate coping.  Caregiver reported that the team is trying to figure out the same issue that the pt has had and that's all they can ask is that the team keeps trying and not give up.  SW discussed self care.  Sanjuanita denied engaging in self care this week.  SW encouraged self care.  Sanjuanita reported she will present to the apartment later today for a shower and take a "break".  Sanjuanita reported that the pt reported that today his goals are to get outside, walk around and eat more regular food as he ate very well today.  Pts caregiver denied any current needs at this time and reports that he will reach out if anything changes. SW remains available for continued psychosocial support, education, resources, and additional d/c planning as needed.   "

## 2018-05-16 NOTE — PLAN OF CARE
Problem: Patient Care Overview  Goal: Plan of Care Review  Outcome: Ongoing (interventions implemented as appropriate)  Pt aao x 4. VSS. Bed in low locked pos, call light within reach. Pt ambulates w/ independence. C/o pain to biopsy site. Plt 4, replaced during day. HD as scheduled. Am labs ordered. Mother at bedside.

## 2018-05-16 NOTE — ASSESSMENT & PLAN NOTE
- Hem/onc consulted for worsening thrombocytopenia despite platelet transfusion  - attempted bone marrow biopsy 5/14 without success due to pain, able to obtain small dry sample, heme to attempt to run test on biopsy  - HIT panel negative.   - CT chest/abd/pelvis to evaluate lymph nodes - reviewed with no lymphadenopathy  - will follow Heme recommendations - steroids, d/c cya

## 2018-05-17 LAB
ABO + RH BLD: NORMAL
ALBUMIN SERPL BCP-MCNC: 2.3 G/DL
ALP SERPL-CCNC: 158 U/L
ALT SERPL W/O P-5'-P-CCNC: <5 U/L
ANION GAP SERPL CALC-SCNC: 11 MMOL/L
ANISOCYTOSIS BLD QL SMEAR: SLIGHT
AST SERPL-CCNC: 13 U/L
BASOPHILS # BLD AUTO: ABNORMAL K/UL
BASOPHILS NFR BLD: 0 %
BILIRUB SERPL-MCNC: 1.8 MG/DL
BLD GP AB SCN CELLS X3 SERPL QL: NORMAL
BUN SERPL-MCNC: 43 MG/DL
CALCIUM SERPL-MCNC: 8.7 MG/DL
CHLORIDE SERPL-SCNC: 103 MMOL/L
CO2 SERPL-SCNC: 20 MMOL/L
COPPER SERPL-MCNC: 1120 UG/L (ref 665–1480)
CREAT SERPL-MCNC: 4.8 MG/DL
CYCLOSPORINE BLD LC/MS/MS-MCNC: 94 NG/ML
DEPRECATED SRA 0.1U/ML PORCINE HEPARIN S: 0 %
DEPRECATED SRA 100U/ML PORCINE HEPARIN S: 0 %
DIFFERENTIAL METHOD: ABNORMAL
EOSINOPHIL # BLD AUTO: ABNORMAL K/UL
EOSINOPHIL NFR BLD: 0 %
ERYTHROCYTE [DISTWIDTH] IN BLOOD BY AUTOMATED COUNT: 16.2 %
EST. GFR  (AFRICAN AMERICAN): 17.7 ML/MIN/1.73 M^2
EST. GFR  (NON AFRICAN AMERICAN): 15.3 ML/MIN/1.73 M^2
GLUCOSE SERPL-MCNC: 192 MG/DL
HCT VFR BLD AUTO: 23.1 %
HGB BLD-MCNC: 7.7 G/DL
IMM GRANULOCYTES # BLD AUTO: ABNORMAL K/UL
IMM GRANULOCYTES NFR BLD AUTO: ABNORMAL %
INR PPP: 1
INTERPRETATION SERPL IFE-IMP: NORMAL
LYMPHOCYTES # BLD AUTO: ABNORMAL K/UL
LYMPHOCYTES NFR BLD: 21 %
MAGNESIUM SERPL-MCNC: 1.6 MG/DL
MCH RBC QN AUTO: 30.7 PG
MCHC RBC AUTO-ENTMCNC: 33.3 G/DL
MCV RBC AUTO: 92 FL
MONOCYTES # BLD AUTO: ABNORMAL K/UL
MONOCYTES NFR BLD: 7 %
NEUTROPHILS NFR BLD: 72 %
NRBC BLD-RTO: 0 /100 WBC
PATHOLOGIST INTERPRETATION IFE: NORMAL
PHOSPHATE SERPL-MCNC: 4.2 MG/DL
PLATELET # BLD AUTO: 4 K/UL
PLATELET BLD QL SMEAR: ABNORMAL
PMV BLD AUTO: ABNORMAL FL
POTASSIUM SERPL-SCNC: 4.7 MMOL/L
PROT SERPL-MCNC: 6.4 G/DL
PROTHROMBIN TIME: 10.3 SEC
RBC # BLD AUTO: 2.51 M/UL
RETICS/RBC NFR AUTO: 3.2 %
SODIUM SERPL-SCNC: 134 MMOL/L
SRA PORCINE INTERP SER-IMP: NEGATIVE
WBC # BLD AUTO: 1.89 K/UL

## 2018-05-17 PROCEDURE — 25000003 PHARM REV CODE 250: Mod: NTX | Performed by: SURGERY

## 2018-05-17 PROCEDURE — 85027 COMPLETE CBC AUTOMATED: CPT | Mod: NTX

## 2018-05-17 PROCEDURE — 63600175 PHARM REV CODE 636 W HCPCS: Mod: NTX | Performed by: STUDENT IN AN ORGANIZED HEALTH CARE EDUCATION/TRAINING PROGRAM

## 2018-05-17 PROCEDURE — 25000003 PHARM REV CODE 250: Mod: NTX | Performed by: STUDENT IN AN ORGANIZED HEALTH CARE EDUCATION/TRAINING PROGRAM

## 2018-05-17 PROCEDURE — 85610 PROTHROMBIN TIME: CPT | Mod: NTX

## 2018-05-17 PROCEDURE — 85007 BL SMEAR W/DIFF WBC COUNT: CPT | Mod: NTX

## 2018-05-17 PROCEDURE — 25000003 PHARM REV CODE 250: Mod: NTX | Performed by: PHYSICIAN ASSISTANT

## 2018-05-17 PROCEDURE — 80053 COMPREHEN METABOLIC PANEL: CPT | Mod: NTX

## 2018-05-17 PROCEDURE — 85045 AUTOMATED RETICULOCYTE COUNT: CPT | Mod: NTX

## 2018-05-17 PROCEDURE — 20600001 HC STEP DOWN PRIVATE ROOM: Mod: NTX

## 2018-05-17 PROCEDURE — 84100 ASSAY OF PHOSPHORUS: CPT | Mod: NTX

## 2018-05-17 PROCEDURE — 99233 SBSQ HOSP IP/OBS HIGH 50: CPT | Mod: NTX,,, | Performed by: NURSE PRACTITIONER

## 2018-05-17 PROCEDURE — 86901 BLOOD TYPING SEROLOGIC RH(D): CPT | Mod: NTX

## 2018-05-17 PROCEDURE — 63600175 PHARM REV CODE 636 W HCPCS: Mod: NTX | Performed by: NURSE PRACTITIONER

## 2018-05-17 PROCEDURE — 25000003 PHARM REV CODE 250: Mod: NTX | Performed by: NURSE PRACTITIONER

## 2018-05-17 PROCEDURE — 83735 ASSAY OF MAGNESIUM: CPT | Mod: NTX

## 2018-05-17 PROCEDURE — 80158 DRUG ASSAY CYCLOSPORINE: CPT | Mod: NTX

## 2018-05-17 RX ORDER — BISACODYL 10 MG
10 SUPPOSITORY, RECTAL RECTAL DAILY PRN
Status: DISCONTINUED | OUTPATIENT
Start: 2018-05-17 | End: 2018-05-23 | Stop reason: HOSPADM

## 2018-05-17 RX ORDER — LIDOCAINE 50 MG/G
1 PATCH TOPICAL
Status: DISCONTINUED | OUTPATIENT
Start: 2018-05-17 | End: 2018-05-21

## 2018-05-17 RX ORDER — BUPIVACAINE HYDROCHLORIDE 2.5 MG/ML
30 INJECTION, SOLUTION EPIDURAL; INFILTRATION; INTRACAUDAL ONCE
Status: COMPLETED | OUTPATIENT
Start: 2018-05-17 | End: 2018-05-17

## 2018-05-17 RX ADMIN — OXYCODONE HYDROCHLORIDE 10 MG: 5 TABLET ORAL at 10:05

## 2018-05-17 RX ADMIN — BISACODYL 10 MG: 5 TABLET, COATED ORAL at 08:05

## 2018-05-17 RX ADMIN — LEVOTHYROXINE SODIUM 75 MCG: 25 TABLET ORAL at 06:05

## 2018-05-17 RX ADMIN — LEVETIRACETAM 500 MG: 500 TABLET ORAL at 08:05

## 2018-05-17 RX ADMIN — OXYCODONE HYDROCHLORIDE 10 MG: 5 TABLET ORAL at 02:05

## 2018-05-17 RX ADMIN — METOCLOPRAMIDE HYDROCHLORIDE 10 MG: 5 TABLET ORAL at 11:05

## 2018-05-17 RX ADMIN — LIDOCAINE 1 PATCH: 50 PATCH TOPICAL at 04:05

## 2018-05-17 RX ADMIN — BISACODYL 10 MG: 10 SUPPOSITORY RECTAL at 10:05

## 2018-05-17 RX ADMIN — DRONABINOL 2.5 MG: 2.5 CAPSULE ORAL at 08:05

## 2018-05-17 RX ADMIN — URSODIOL 300 MG: 300 CAPSULE ORAL at 08:05

## 2018-05-17 RX ADMIN — FAMOTIDINE 20 MG: 20 TABLET ORAL at 08:05

## 2018-05-17 RX ADMIN — AMLODIPINE BESYLATE 5 MG: 5 TABLET ORAL at 08:05

## 2018-05-17 RX ADMIN — METOCLOPRAMIDE HYDROCHLORIDE 10 MG: 5 TABLET ORAL at 06:05

## 2018-05-17 RX ADMIN — SEVELAMER CARBONATE 800 MG: 800 TABLET, FILM COATED ORAL at 04:05

## 2018-05-17 RX ADMIN — SEVELAMER CARBONATE 800 MG: 800 TABLET, FILM COATED ORAL at 12:05

## 2018-05-17 RX ADMIN — HYDROCORTISONE SODIUM SUCCINATE 100 MG: 100 INJECTION, POWDER, FOR SOLUTION INTRAMUSCULAR; INTRAVENOUS at 06:05

## 2018-05-17 RX ADMIN — ERGOCALCIFEROL 50000 UNITS: 1.25 CAPSULE ORAL at 08:05

## 2018-05-17 RX ADMIN — METOCLOPRAMIDE HYDROCHLORIDE 10 MG: 5 TABLET ORAL at 04:05

## 2018-05-17 RX ADMIN — HYDROCORTISONE SODIUM SUCCINATE 100 MG: 100 INJECTION, POWDER, FOR SOLUTION INTRAMUSCULAR; INTRAVENOUS at 02:05

## 2018-05-17 RX ADMIN — METOCLOPRAMIDE HYDROCHLORIDE 10 MG: 5 TABLET ORAL at 08:05

## 2018-05-17 RX ADMIN — HYDROCORTISONE SODIUM SUCCINATE 100 MG: 100 INJECTION, POWDER, FOR SOLUTION INTRAMUSCULAR; INTRAVENOUS at 08:05

## 2018-05-17 RX ADMIN — BUPIVACAINE HYDROCHLORIDE 75 MG: 2.5 INJECTION, SOLUTION EPIDURAL; INFILTRATION; INTRACAUDAL; PERINEURAL at 03:05

## 2018-05-17 RX ADMIN — OXYCODONE HYDROCHLORIDE 10 MG: 5 TABLET ORAL at 06:05

## 2018-05-17 RX ADMIN — SEVELAMER CARBONATE 800 MG: 800 TABLET, FILM COATED ORAL at 08:05

## 2018-05-17 NOTE — PROGRESS NOTES
[facility]  Hematology/Oncology  Progress Note    Patient Name: Jhonny Diana  Admission Date: 5/8/2018  Hospital Length of Stay: 8 days  Code Status: Full Code     Subjective:     Interval History: Patient complains of pain to BMBX site. Plts remain 4. Hgb upt to 7.7 from 6.3 with one unit PRBC. WBC low with anc 1360. 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      Oncology Treatment Plan:   [No treatment plan]    Medications:  Continuous Infusions:  Scheduled Meds:   amLODIPine  5 mg Oral Daily    bisacodyl  10 mg Oral Daily    bupivacaine (PF) 0.25% (2.5 mg/ml)  30 mL Subcutaneous Once    dronabinol  2.5 mg Oral QHS    epoetin maurisio (PROCRIT) injection  20,000 Units Intravenous Every Mon, Fri    ergocalciferol  50,000 Units Oral Q7 Days    famotidine  20 mg Oral QHS    hydrocortisone sodium succinate  100 mg Intravenous Q8H    levETIRAcetam  500 mg Oral BID    levothyroxine  75 mcg Oral Before breakfast    lidocaine  1 patch Transdermal Q24H    metoclopramide HCl  10 mg Oral QID (AC & HS)    sevelamer carbonate  800 mg Oral TID WM    ursodiol  300 mg Oral BID     PRN Meds:sodium chloride, sodium chloride 0.9%, sodium chloride 0.9%, bisacodyl, gentamicin, omnipaque, ondansetron, oxyCODONE, prochlorperazine, ramelteon, sodium chloride 0.9%     Review of Systems   Constitutional: Positive for fatigue. Negative for fever.   HENT: Negative for congestion and trouble swallowing.    Respiratory: Negative for cough.    Cardiovascular: Negative for chest pain and leg swelling.   Gastrointestinal: Positive for abdominal distention. Negative for abdominal pain.   Musculoskeletal: Positive for back pain.   Neurological: Negative for light-headedness and headaches.   Hematological: Negative for adenopathy.   Psychiatric/Behavioral: Negative for agitation.     Objective:     Vital Signs (Most  Recent):  Temp: 98.2 °F (36.8 °C) (05/17/18 1304)  Pulse: 70 (05/17/18 1304)  Resp: 17 (05/17/18 1304)  BP: 137/86 (05/17/18 1304)  SpO2: 99 % (05/17/18 1304) Vital Signs (24h Range):  Temp:  [97.9 °F (36.6 °C)-98.6 °F (37 °C)] 98.2 °F (36.8 °C)  Pulse:  [70-85] 70  Resp:  [16-18] 17  SpO2:  [96 %-99 %] 99 %  BP: (123-152)/(73-96) 137/86     Weight: 59.1 kg (130 lb 4.7 oz)  Body mass index is 21.03 kg/m².  Body surface area is 1.66 meters squared.      Intake/Output Summary (Last 24 hours) at 05/17/18 1428  Last data filed at 05/17/18 0500   Gross per 24 hour   Intake               50 ml   Output              250 ml   Net             -200 ml       Physical Exam   Constitutional: He is oriented to person, place, and time. He appears well-developed and well-nourished.   HENT:   Mouth/Throat: No oropharyngeal exudate.   Eyes: Conjunctivae are normal. Pupils are equal, round, and reactive to light.   Neck: Normal range of motion.   Cardiovascular: Normal rate and regular rhythm.    Pulmonary/Chest: Effort normal and breath sounds normal.   Abdominal: Soft. Bowel sounds are normal.   Lymphadenopathy:     He has no cervical adenopathy.   Neurological: He is alert and oriented to person, place, and time.   Skin: Skin is warm.   Psychiatric: He has a normal mood and affect. His behavior is normal.       Significant Labs:   CBC:   Recent Labs  Lab 05/16/18  0511 05/17/18  0510   WBC 2.23* 1.89*   HGB 6.3* 7.7*   HCT 19.6* 23.1*   PLT 4* 4*    and CMP:   Recent Labs  Lab 05/16/18  0511 05/17/18  0509   * 134*   K 4.2 4.7    103   CO2 23 20*   GLU 83 192*   BUN 31* 43*   CREATININE 4.2* 4.8*   CALCIUM 8.7 8.7   PROT 6.1 6.4   ALBUMIN 2.3* 2.3*   BILITOT 1.7* 1.8*   ALKPHOS 147* 158*   AST 14 13   ALT <5* <5*   ANIONGAP 9 11   EGFRNONAA 18.0* 15.3*       Diagnostic Results:  I have reviewed all pertinent imaging results/findings within the past 24 hours.    Assessment/Plan:     Active Diagnoses:    Diagnosis Date  Noted POA    Elevated bilirubin [R17] 05/12/2018 Yes    Other pancytopenia [D61.818] 05/12/2018 Yes    Thrombocytopenia [D69.6] 05/12/2018 Yes    Chronic kidney disease-mineral and bone disorder [N18.9, E83.9, M89.9] 05/09/2018 Yes    Anemia of chronic renal failure [N18.9, D63.1]  Yes    Hyperphosphatemia [E83.39] 04/04/2018 Yes    ESRD Monday/Friday [N18.6] 01/13/2018 Yes    Severe protein-calorie malnutrition [E43] 01/11/2018 Yes    Biliary stricture of transplanted liver [T86.49, K83.1] 12/07/2017 Yes    Hypomagnesemia [E83.42] 11/18/2017 Yes    S/P liver transplant [Z94.4] 10/20/2017 Not Applicable     Chronic    Prophylactic immunotherapy [Z29.8] 10/20/2017 Not Applicable     Chronic    Long-term use of immunosuppressant medication [Z79.899] 10/20/2017 Not Applicable     Chronic      Problems Resolved During this Admission:    Diagnosis Date Noted Date Resolved POA    PRINCIPAL PROBLEM:  Fever [R50.9] 05/09/2018 05/11/2018 Yes    Elevated LFTs [R79.89] 04/30/2018 05/09/2018 Yes     Plts remain very low. Marrow is hypoplastic with relative megakaryocyte hyperplasia. This may be reactive to underlying organ dysfunction, drug and or peripheral destruction (specifically referring to platelets). Would attempt lower levels of cyclosporine. Would consider pulse steroids or High dose steroids for possible ITP.   '    Isis Cavazos MD  Hematology/Oncology  [facility]      I have reviewed the notes, assessments, and/or procedures performed by the housestaff, as above.  I have personally interviewed and examined the patient at the beside, and rounded with the housestaff. I concur with her/his assessment and plan and the documentation of Jhonny Diana.  I, Dr. Elliott Burdick, personally spent more than  35 mins during this encounter, greater than 50% was spent in direct counseling and/or coordination of care.     Elliott Burdick M.D., M.S., F.A.C.P.  Hematology/Oncology  Attending  [facility]

## 2018-05-17 NOTE — ASSESSMENT & PLAN NOTE
- continue dronabinol for appetite stimulant  - po supplements  - appetite improving slowly  - dietary following  - prealbumin 10 on 5/14, previously 17 a month ago

## 2018-05-17 NOTE — PROGRESS NOTES
[facility]  Liver Transplant  Progress Note    Patient Name: Jhonny Diana  MRN: [id number]  Admission Date: 2018  Hospital Length of Stay: 8 days  Code Status: Full Code  Primary Care Provider: Primary Doctor No  Post-Operative Day: 210    ORGAN:   LIVER  Disease Etiology: Acute Alcoholic Hepatitis  Donor Type:    - Brain Death  CDC High Risk:   No  Donor CMV Status:   Donor CMV Status: Positive  Donor HBcAB:   Negative  Donor HCV Status:   Negative  Whole or Partial: Whole Liver  Biliary Anastomosis: End to End  Arterial Anatomy: Standard  Subjective:     History of Present Illness:  Mr. 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  S/p OLTX 10/19/2017 (Liver) with steroid induction. Post op course with multiple complications to include biliary stricture, FUO, ESRD, and chronic abdominal pain. Recently discharged on  after an ERCP. Reports that since discharge he has had fevers of 100.4 to 100.8. These fevers resolve on their own without medication. Also reports decreased appetite and fatigue. Nausea and abdominal pain are baseline and unchanged from prior to discharge. Normal bowel movements and passing gas. Receiving dialysis Monday and Friday. On admission, patient was afebrile and hemodynamically stable. Labs were stable from prior except tbili which is up to 3.9 from 3.2 yesterday.     Hospital Course:  Interval History: no acute events overnight. Hematology consulted for worsening thrombocytopenia, platelets transfused 2 days prior with no response, plts 4,000 again this AM. Attempted bone marrow biopsy at bedside without success, only able to obtain small dry tap. C/o pain at biopsy site. HIT negative. CT chest/abd/pelvis to evaluate lymph nodes  with no lymphadenopathy. Hematology work up still in process. H/H low today, PRBC transfusion today. Prealbumin 10, previously 17 a month ago, continue to encourage oral intake.    Scheduled Meds:   amLODIPine  5  mg Oral Daily    bisacodyl  10 mg Oral Daily    dronabinol  2.5 mg Oral QHS    epoetin maurisio (PROCRIT) injection  20,000 Units Intravenous Every Mon, Fri    ergocalciferol  50,000 Units Oral Q7 Days    famotidine  20 mg Oral QHS    hydrocortisone sodium succinate  100 mg Intravenous Q8H    levETIRAcetam  500 mg Oral BID    levothyroxine  75 mcg Oral Before breakfast    metoclopramide HCl  10 mg Oral QID (AC & HS)    sevelamer carbonate  800 mg Oral TID WM    ursodiol  300 mg Oral BID     Continuous Infusions:  PRN Meds:sodium chloride, sodium chloride 0.9%, sodium chloride 0.9%, gentamicin, omnipaque, ondansetron, oxyCODONE, prochlorperazine, ramelteon, sodium chloride 0.9%    Review of Systems   Constitutional: Positive for activity change, appetite change and fatigue. Negative for chills and fever.   Respiratory: Negative for cough and shortness of breath.    Cardiovascular: Negative for chest pain and leg swelling.   Gastrointestinal: Positive for abdominal distention, abdominal pain, constipation, nausea and vomiting.   Genitourinary: Positive for decreased urine volume and flank pain.   Musculoskeletal: Negative for arthralgias, back pain and myalgias.   Skin: Negative for color change and wound.   Allergic/Immunologic: Positive for immunocompromised state.   Neurological: Positive for weakness. Negative for dizziness, syncope and headaches.     Objective:     Vital Signs (Most Recent):  Temp: 98.5 °F (36.9 °C) (05/16/18 1134)  Pulse: 71 (05/16/18 1134)  Resp: 18 (05/16/18 1134)  BP: 135/86 (05/16/18 1134)  SpO2: 97 % (05/16/18 1134) Vital Signs (24h Range):  Temp:  [97.9 °F (36.6 °C)-99.4 °F (37.4 °C)] 98.5 °F (36.9 °C)  Pulse:  [71-98] 71  Resp:  [16-20] 18  SpO2:  [96 %-100 %] 97 %  BP: (126-144)/(86-92) 135/86     Weight: 59.7 kg (131 lb 9.8 oz)  Body mass index is 21.24 kg/m².    Intake/Output - Last 3 Shifts       05/14 0700 - 05/15 0659 05/15 0700 - 05/16 0659 05/16 0700 - 05/17 0659    P.O.  720 220     I.V. (mL/kg)  0 (0)     Blood 271.3 31.3     Other 600 0     Total Intake(mL/kg) 1591.3 (28.1) 251.3 (4.2)     Urine (mL/kg/hr) 250 (0.2)      Emesis/NG output 0 (0)      Other 1820 (1.3)      Stool 0 (0)      Blood 0 (0)      Total Output 2070        Net -478.8 +251.3             Urine Occurrence 1 x 0 x     Stool Occurrence 0 x 0 x     Emesis Occurrence 0 x            Physical Exam   Constitutional: He is oriented to person, place, and time. He appears well-developed. He is cooperative.   Hand and temporal muscle wasting   HENT:   Head: Normocephalic and atraumatic.   Eyes: Pupils are equal, round, and reactive to light.   Neck: Normal range of motion.   Cardiovascular: Normal rate, regular rhythm, normal heart sounds, intact distal pulses and normal pulses.    Pulmonary/Chest: Effort normal. No respiratory distress. He has decreased breath sounds in the right lower field and the left lower field. He has no rales.   Abdominal: Soft. Bowel sounds are normal. He exhibits distension. There is generalized tenderness.   Musculoskeletal: Normal range of motion. He exhibits no edema.   Neurological: He is alert and oriented to person, place, and time.   Skin: Skin is warm and dry. There is pallor.   Psychiatric: He has a normal mood and affect. His behavior is normal. Judgment and thought content normal.   Nursing note and vitals reviewed.      Laboratory:  Immunosuppressants     None        CBC:     Recent Labs  Lab 05/16/18  0511   WBC 2.23*   RBC 2.06*   HGB 6.3*   HCT 19.6*   PLT 4*   MCV 95   MCH 30.6   MCHC 32.1     CMP:     Recent Labs  Lab 05/16/18  0511   GLU 83   CALCIUM 8.7   ALBUMIN 2.3*   PROT 6.1   *   K 4.2   CO2 23      BUN 31*   CREATININE 4.2*   ALKPHOS 147*   ALT <5*   AST 14   BILITOT 1.7*     Coagulation:     Recent Labs  Lab 05/16/18  0511   INR 1.0     Labs within the past 24 hours have been reviewed.    Diagnostic Results:  I have personally reviewed all pertinent imaging  studies.    Assessment/Plan:     Other pancytopenia    - Hem/onc consulted for worsening thrombocytopenia despite platelet transfusion  - attempted bone marrow biopsy 5/14 without success due to pain, able to obtain small dry sample, heme to attempt to run test on biopsy  - HIT panel negative.   - CT chest/abd/pelvis to evaluate lymph nodes - reviewed with no lymphadenopathy  - will follow Heme recommendations - steroids, d/c cya         Severe protein-calorie malnutrition    - continue dronabinol for appetite stimulant  - po supplements  - appetite improving slowly  - dietary following  - prealbumin 10 on 5/14, previously 17 a month ago        Anemia of chronic renal failure    - h/h chronically low  - continue epoetin  - hemolysis labs concerning - hematology consulted & following  - H/H improved s/p 1 unit of PRBC 5/12.   - transfuse 1 unit prbc        Biliary stricture of transplanted liver    - s/p ERCP 5/4  - stent in place per imaging  - PBS consulted for possible ERCP - not recommending ERCP for now        S/P liver transplant    - LFTs stable  - liver u/s reviewed - unremarkable  - monitor        Long-term use of immunosuppressant medication    - See prophylactic immunosuppression        Prophylactic immunotherapy    - continue cyclosporine  - monitor cya levels daily and adjust for therapeutic dose        Thrombocytopenia    - see other pancytopenia        Elevated bilirubin    - s/p ERCP 5/4        Hyperphosphatemia    - continue sevelamer carbonate  - monitor labs daily        Vitamin D deficiency    - continue ergo replacement          ESRD Monday/Friday    - nephrology following  - making some urine, however not clearing  - Monday/friday dialysis schedule        Hypomagnesemia    - stable  - monitor labs daily            VTE Risk Mitigation         Ordered     IP VTE HIGH RISK PATIENT  Once      05/09/18 0044          The patients clinical status was discussed at multidisplinary rounds, involving  transplant surgery, transplant medicine, pharmacy, nursing, nutrition, and social work    Discharge Planning:  Monitor kidney function --> may need outpt HD  Monitor HH needs vs rehab for deconditioned status      Seda Camacho NP  Liver Transplant  [facility]

## 2018-05-17 NOTE — PROGRESS NOTES
[facility]  Liver Transplant  Progress Note    Patient Name: Jhonny Diana  MRN: [id number]  Admission Date: 2018  Hospital Length of Stay: 8 days  Code Status: Full Code  Primary Care Provider: Primary Doctor No  Post-Operative Day: 210    ORGAN:   LIVER  Disease Etiology: Acute Alcoholic Hepatitis  Donor Type:    - Brain Death  CDC High Risk:   No  Donor CMV Status:   Donor CMV Status: Positive  Donor HBcAB:   Negative  Donor HCV Status:   Negative  Whole or Partial: Whole Liver  Biliary Anastomosis: End to End  Arterial Anatomy: Standard  Subjective:     History of Present Illness:  Mr. 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  S/p OLTX 10/19/2017 (Liver) with steroid induction. Post op course with multiple complications to include biliary stricture, FUO, ESRD, and chronic abdominal pain. Recently discharged on  after an ERCP. Reports that since discharge he has had fevers of 100.4 to 100.8. These fevers resolve on their own without medication. Also reports decreased appetite and fatigue. Nausea and abdominal pain are baseline and unchanged from prior to discharge. Normal bowel movements and passing gas. Receiving dialysis Monday and Friday. On admission, patient was afebrile and hemodynamically stable. Labs were stable from prior except tbili which is up to 3.9 from 3.2 yesterday.     Hospital Course:  Interval History: no acute events overnight. Hematology consulted for worsening thrombocytopenia.  Has been transfused with platelets with little response.  Plts 4,000 again this AM. Attempted bone marrow biopsy at bedside without success, only able to obtain small dry tap. C/o pain at biopsy site - plan to numb with bupivicaine and apply lidocaine patch. HIT negative. CT chest/abd/pelvis to evaluate lymph nodes  with no lymphadenopathy. Hematology work up still in process. Prealbumin 10, previously 17 a month ago, continue to encourage oral  intake.    Scheduled Meds:   amLODIPine  5 mg Oral Daily    bisacodyl  10 mg Oral Daily    bupivacaine (PF) 0.25% (2.5 mg/ml)  30 mL Subcutaneous Once    dronabinol  2.5 mg Oral QHS    epoetin maurisio (PROCRIT) injection  20,000 Units Intravenous Every Mon, Fri    ergocalciferol  50,000 Units Oral Q7 Days    famotidine  20 mg Oral QHS    hydrocortisone sodium succinate  100 mg Intravenous Q8H    levETIRAcetam  500 mg Oral BID    levothyroxine  75 mcg Oral Before breakfast    lidocaine  1 patch Transdermal Q24H    metoclopramide HCl  10 mg Oral QID (AC & HS)    sevelamer carbonate  800 mg Oral TID WM    ursodiol  300 mg Oral BID     Continuous Infusions:  PRN Meds:sodium chloride, sodium chloride 0.9%, sodium chloride 0.9%, bisacodyl, gentamicin, omnipaque, ondansetron, oxyCODONE, prochlorperazine, ramelteon, sodium chloride 0.9%    Review of Systems   Constitutional: Positive for activity change and appetite change. Negative for chills, fatigue and fever.   Respiratory: Negative for cough and shortness of breath.    Cardiovascular: Negative for chest pain and leg swelling.   Gastrointestinal: Positive for abdominal distention, abdominal pain and constipation. Negative for nausea and vomiting.   Genitourinary: Positive for decreased urine volume and flank pain.   Musculoskeletal: Negative for arthralgias, back pain and myalgias.   Skin: Negative for color change and wound.   Allergic/Immunologic: Positive for immunocompromised state.   Neurological: Positive for weakness. Negative for dizziness, syncope and headaches.     Objective:     Vital Signs (Most Recent):  Temp: 98.2 °F (36.8 °C) (05/17/18 1304)  Pulse: 70 (05/17/18 1304)  Resp: 17 (05/17/18 1304)  BP: 137/86 (05/17/18 1304)  SpO2: 99 % (05/17/18 1304) Vital Signs (24h Range):  Temp:  [97.9 °F (36.6 °C)-98.6 °F (37 °C)] 98.2 °F (36.8 °C)  Pulse:  [70-85] 70  Resp:  [16-18] 17  SpO2:  [96 %-99 %] 99 %  BP: (123-152)/(73-96) 137/86     Weight: 59.1  kg (130 lb 4.7 oz)  Body mass index is 21.03 kg/m².    Intake/Output - Last 3 Shifts       05/15 0700 - 05/16 0659 05/16 0700 - 05/17 0659 05/17 0700 - 05/18 0659    P.O. 220 270     I.V. (mL/kg) 0 (0) 0 (0)     Blood 31.3      Other 0 0     Total Intake(mL/kg) 251.3 (4.2) 270 (4.6)     Urine (mL/kg/hr)  640 (0.5)     Total Output   640      Net +251.3 -370             Urine Occurrence 0 x 0 x     Stool Occurrence 0 x 0 x           Physical Exam   Constitutional: He is oriented to person, place, and time. He appears well-developed. He is cooperative.   Hand and temporal muscle wasting   HENT:   Head: Normocephalic and atraumatic.   Eyes: Pupils are equal, round, and reactive to light.   Neck: Normal range of motion.   Cardiovascular: Normal rate, regular rhythm, normal heart sounds, intact distal pulses and normal pulses.    Pulmonary/Chest: Effort normal. No respiratory distress. He has decreased breath sounds in the right lower field and the left lower field. He has no rales.   Abdominal: Soft. Bowel sounds are normal. He exhibits distension. There is generalized tenderness.   Musculoskeletal: Normal range of motion. He exhibits no edema.   Neurological: He is alert and oriented to person, place, and time.   Skin: Skin is warm and dry. There is pallor.   Psychiatric: He has a normal mood and affect. His behavior is normal. Judgment and thought content normal.   Nursing note and vitals reviewed.      Laboratory:  Immunosuppressants     None        CBC:     Recent Labs  Lab 05/17/18  0510   WBC 1.89*   RBC 2.51*   HGB 7.7*   HCT 23.1*   PLT 4*   MCV 92   MCH 30.7   MCHC 33.3     CMP:     Recent Labs  Lab 05/17/18  0509   *   CALCIUM 8.7   ALBUMIN 2.3*   PROT 6.4   *   K 4.7   CO2 20*      BUN 43*   CREATININE 4.8*   ALKPHOS 158*   ALT <5*   AST 13   BILITOT 1.8*     Coagulation:     Recent Labs  Lab 05/17/18  0509   INR 1.0     Labs within the past 24 hours have been reviewed.    Diagnostic  Results:  I have personally reviewed all pertinent imaging studies.    Assessment/Plan:     Other pancytopenia    - Hem/onc consulted for worsening thrombocytopenia despite platelet transfusion  - attempted bone marrow biopsy 5/14 without success due to pain, able to obtain small dry sample, heme to attempt to run test on biopsy  - HIT panel negative.   - CT chest/abd/pelvis 5/15 to evaluate lymph nodes - reviewed with no lymphadenopathy  - will follow Heme recommendations - steroids, d/c cya   - bone marrow biopsy site painful - apply lidocaine patch and inject bupivacaine        Severe protein-calorie malnutrition    - continue dronabinol for appetite stimulant  - po supplements  - appetite improving slowly  - dietary following  - prealbumin 10 on 5/14, previously 17 a month ago        Anemia of chronic renal failure    - h/h chronically low  - continue epoetin  - hemolysis labs concerning - hematology consulted & following  - H/H improved s/p 1 unit of PRBC 5/12.   - transfused 1 unit prbc 5/16 with good response  - monitor cbc daily        Biliary stricture of transplanted liver    - s/p ERCP 5/4  - stent in place per imaging  - PBS consulted for possible ERCP - not recommending ERCP for now        S/P liver transplant    - LFTs stable  - liver u/s reviewed - unremarkable  - monitor        Constipation    - suppository 5/17  - continue to decrease pain medication  - continue bisacodyl          Long-term use of immunosuppressant medication    - See prophylactic immunosuppression        Prophylactic immunotherapy    - continue cyclosporine  - monitor cya levels daily and adjust for therapeutic dose        Thrombocytopenia    - see other pancytopenia        Hyperphosphatemia    - continue sevelamer carbonate  - monitor labs daily        Vitamin D deficiency    - continue ergo replacement          ESRD Monday/Friday    - nephrology following  - making some urine, however not clearing  - Monday/friday dialysis  schedule        Hypomagnesemia    - stable  - monitor labs daily            VTE Risk Mitigation         Ordered     IP VTE HIGH RISK PATIENT  Once      05/09/18 0044          The patients clinical status was discussed at multidisplinary rounds, involving transplant surgery, transplant medicine, pharmacy, nursing, nutrition, and social work    Discharge Planning:  Monitor kidney function --> may need outpt HD  Monitor HH needs vs rehab for deconditioned status      Seda Camacho, NP  Liver Transplant  [facility]

## 2018-05-17 NOTE — ASSESSMENT & PLAN NOTE
- h/h chronically low  - continue epoetin  - hemolysis labs concerning - hematology consulted & following  - H/H improved s/p 1 unit of PRBC 5/12.   - transfused 1 unit prbc 5/16 with good response  - monitor cbc daily

## 2018-05-17 NOTE — ASSESSMENT & PLAN NOTE
- h/h chronically low  - continue epoetin  - hemolysis labs concerning - hematology consulted & following  - H/H improved s/p 1 unit of PRBC 5/12.   - transfuse 1 unit prbc

## 2018-05-17 NOTE — SUBJECTIVE & OBJECTIVE
Scheduled Meds:   amLODIPine  5 mg Oral Daily    bisacodyl  10 mg Oral Daily    bupivacaine (PF) 0.25% (2.5 mg/ml)  30 mL Subcutaneous Once    dronabinol  2.5 mg Oral QHS    epoetin maurisio (PROCRIT) injection  20,000 Units Intravenous Every Mon, Fri    ergocalciferol  50,000 Units Oral Q7 Days    famotidine  20 mg Oral QHS    hydrocortisone sodium succinate  100 mg Intravenous Q8H    levETIRAcetam  500 mg Oral BID    levothyroxine  75 mcg Oral Before breakfast    lidocaine  1 patch Transdermal Q24H    metoclopramide HCl  10 mg Oral QID (AC & HS)    sevelamer carbonate  800 mg Oral TID WM    ursodiol  300 mg Oral BID     Continuous Infusions:  PRN Meds:sodium chloride, sodium chloride 0.9%, sodium chloride 0.9%, bisacodyl, gentamicin, omnipaque, ondansetron, oxyCODONE, prochlorperazine, ramelteon, sodium chloride 0.9%    Review of Systems   Constitutional: Positive for activity change and appetite change. Negative for chills, fatigue and fever.   Respiratory: Negative for cough and shortness of breath.    Cardiovascular: Negative for chest pain and leg swelling.   Gastrointestinal: Positive for abdominal distention, abdominal pain and constipation. Negative for nausea and vomiting.   Genitourinary: Positive for decreased urine volume and flank pain.   Musculoskeletal: Negative for arthralgias, back pain and myalgias.   Skin: Negative for color change and wound.   Allergic/Immunologic: Positive for immunocompromised state.   Neurological: Positive for weakness. Negative for dizziness, syncope and headaches.     Objective:     Vital Signs (Most Recent):  Temp: 98.2 °F (36.8 °C) (05/17/18 1304)  Pulse: 70 (05/17/18 1304)  Resp: 17 (05/17/18 1304)  BP: 137/86 (05/17/18 1304)  SpO2: 99 % (05/17/18 1304) Vital Signs (24h Range):  Temp:  [97.9 °F (36.6 °C)-98.6 °F (37 °C)] 98.2 °F (36.8 °C)  Pulse:  [70-85] 70  Resp:  [16-18] 17  SpO2:  [96 %-99 %] 99 %  BP: (123-152)/(73-96) 137/86     Weight: 59.1 kg (130 lb  4.7 oz)  Body mass index is 21.03 kg/m².    Intake/Output - Last 3 Shifts       05/15 0700 - 05/16 0659 05/16 0700 - 05/17 0659 05/17 0700 - 05/18 0659    P.O. 220 270     I.V. (mL/kg) 0 (0) 0 (0)     Blood 31.3      Other 0 0     Total Intake(mL/kg) 251.3 (4.2) 270 (4.6)     Urine (mL/kg/hr)  640 (0.5)     Total Output   640      Net +251.3 -370             Urine Occurrence 0 x 0 x     Stool Occurrence 0 x 0 x           Physical Exam   Constitutional: He is oriented to person, place, and time. He appears well-developed. He is cooperative.   Hand and temporal muscle wasting   HENT:   Head: Normocephalic and atraumatic.   Eyes: Pupils are equal, round, and reactive to light.   Neck: Normal range of motion.   Cardiovascular: Normal rate, regular rhythm, normal heart sounds, intact distal pulses and normal pulses.    Pulmonary/Chest: Effort normal. No respiratory distress. He has decreased breath sounds in the right lower field and the left lower field. He has no rales.   Abdominal: Soft. Bowel sounds are normal. He exhibits distension. There is generalized tenderness.   Musculoskeletal: Normal range of motion. He exhibits no edema.   Neurological: He is alert and oriented to person, place, and time.   Skin: Skin is warm and dry. There is pallor.   Psychiatric: He has a normal mood and affect. His behavior is normal. Judgment and thought content normal.   Nursing note and vitals reviewed.      Laboratory:  Immunosuppressants     None        CBC:     Recent Labs  Lab 05/17/18  0510   WBC 1.89*   RBC 2.51*   HGB 7.7*   HCT 23.1*   PLT 4*   MCV 92   MCH 30.7   MCHC 33.3     CMP:     Recent Labs  Lab 05/17/18  0509   *   CALCIUM 8.7   ALBUMIN 2.3*   PROT 6.4   *   K 4.7   CO2 20*      BUN 43*   CREATININE 4.8*   ALKPHOS 158*   ALT <5*   AST 13   BILITOT 1.8*     Coagulation:     Recent Labs  Lab 05/17/18  0509   INR 1.0     Labs within the past 24 hours have been reviewed.    Diagnostic Results:  I have  personally reviewed all pertinent imaging studies.

## 2018-05-17 NOTE — PLAN OF CARE
Problem: Patient Care Overview  Goal: Plan of Care Review  Outcome: Ongoing (interventions implemented as appropriate)  Pt aao x 4. VSS. Bed in low locked pos, call light within reach. Pt ambulates w/ independence. C/o pain to biopsy site. Plt 4, cyclosporine dec, steroids started. Prn oxycodone q4.  HD Mon and Fri. Am labs ordered. Mother at bedside.

## 2018-05-17 NOTE — ASSESSMENT & PLAN NOTE
- Hem/onc consulted for worsening thrombocytopenia despite platelet transfusion  - attempted bone marrow biopsy 5/14 without success due to pain, able to obtain small dry sample, heme to attempt to run test on biopsy  - HIT panel negative.   - CT chest/abd/pelvis 5/15 to evaluate lymph nodes - reviewed with no lymphadenopathy  - will follow Heme recommendations - steroids, d/c cya   - bone marrow biopsy site painful - apply lidocaine patch and inject bupivacaine

## 2018-05-18 PROBLEM — E83.42 HYPOMAGNESEMIA: Status: RESOLVED | Noted: 2017-11-18 | Resolved: 2018-05-18

## 2018-05-18 PROBLEM — R00.1 BRADYCARDIA: Status: ACTIVE | Noted: 2018-05-18

## 2018-05-18 LAB
ALBUMIN SERPL BCP-MCNC: 2.4 G/DL
ALP SERPL-CCNC: 135 U/L
ALT SERPL W/O P-5'-P-CCNC: <5 U/L
ANION GAP SERPL CALC-SCNC: 13 MMOL/L
ANISOCYTOSIS BLD QL SMEAR: SLIGHT
AST SERPL-CCNC: 13 U/L
BASOPHILS # BLD AUTO: 0 K/UL
BASOPHILS NFR BLD: 0 %
BILIRUB SERPL-MCNC: 1.7 MG/DL
BUN SERPL-MCNC: 57 MG/DL
CALCIUM SERPL-MCNC: 8.6 MG/DL
CHLORIDE SERPL-SCNC: 103 MMOL/L
CO2 SERPL-SCNC: 18 MMOL/L
CREAT SERPL-MCNC: 5 MG/DL
CYCLOSPORINE BLD LC/MS/MS-MCNC: 36 NG/ML
DIFFERENTIAL METHOD: ABNORMAL
EOSINOPHIL # BLD AUTO: 0 K/UL
EOSINOPHIL NFR BLD: 0 %
ERYTHROCYTE [DISTWIDTH] IN BLOOD BY AUTOMATED COUNT: 16.7 %
EST. GFR  (AFRICAN AMERICAN): 16.8 ML/MIN/1.73 M^2
EST. GFR  (NON AFRICAN AMERICAN): 14.6 ML/MIN/1.73 M^2
GLUCOSE SERPL-MCNC: 125 MG/DL
HCT VFR BLD AUTO: 22.8 %
HGB BLD-MCNC: 7.6 G/DL
IMM GRANULOCYTES # BLD AUTO: 0.01 K/UL
IMM GRANULOCYTES NFR BLD AUTO: 0.4 %
INR PPP: 1
LYMPHOCYTES # BLD AUTO: 0.5 K/UL
LYMPHOCYTES NFR BLD: 17.5 %
MAGNESIUM SERPL-MCNC: 1.8 MG/DL
MCH RBC QN AUTO: 31.1 PG
MCHC RBC AUTO-ENTMCNC: 33.3 G/DL
MCV RBC AUTO: 93 FL
MONOCYTES # BLD AUTO: 0.2 K/UL
MONOCYTES NFR BLD: 6.4 %
NEUTROPHILS # BLD AUTO: 2.1 K/UL
NEUTROPHILS NFR BLD: 75.7 %
NRBC BLD-RTO: 0 /100 WBC
OVALOCYTES BLD QL SMEAR: ABNORMAL
PHOSPHATE SERPL-MCNC: 5.4 MG/DL
PLATELET # BLD AUTO: 7 K/UL
PLATELET BLD QL SMEAR: ABNORMAL
PMV BLD AUTO: ABNORMAL FL
POIKILOCYTOSIS BLD QL SMEAR: SLIGHT
POLYCHROMASIA BLD QL SMEAR: ABNORMAL
POTASSIUM SERPL-SCNC: 4.6 MMOL/L
PROT SERPL-MCNC: 6.4 G/DL
PROTHROMBIN TIME: 10.5 SEC
RBC # BLD AUTO: 2.44 M/UL
RETICS/RBC NFR AUTO: 4.9 %
SODIUM SERPL-SCNC: 134 MMOL/L
WBC # BLD AUTO: 2.8 K/UL

## 2018-05-18 PROCEDURE — 84100 ASSAY OF PHOSPHORUS: CPT | Mod: NTX

## 2018-05-18 PROCEDURE — 20600001 HC STEP DOWN PRIVATE ROOM: Mod: NTX

## 2018-05-18 PROCEDURE — 36415 COLL VENOUS BLD VENIPUNCTURE: CPT | Mod: NTX

## 2018-05-18 PROCEDURE — 96372 THER/PROPH/DIAG INJ SC/IM: CPT | Mod: NTX

## 2018-05-18 PROCEDURE — 93010 ELECTROCARDIOGRAM REPORT: CPT | Mod: NTX,,, | Performed by: INTERNAL MEDICINE

## 2018-05-18 PROCEDURE — 25000003 PHARM REV CODE 250: Mod: NTX | Performed by: NURSE PRACTITIONER

## 2018-05-18 PROCEDURE — 93005 ELECTROCARDIOGRAM TRACING: CPT | Mod: NTX

## 2018-05-18 PROCEDURE — 80158 DRUG ASSAY CYCLOSPORINE: CPT | Mod: NTX

## 2018-05-18 PROCEDURE — 63600175 PHARM REV CODE 636 W HCPCS: Mod: NTX | Performed by: NURSE PRACTITIONER

## 2018-05-18 PROCEDURE — 90935 HEMODIALYSIS ONE EVALUATION: CPT | Mod: NTX

## 2018-05-18 PROCEDURE — 25000003 PHARM REV CODE 250: Mod: NTX | Performed by: STUDENT IN AN ORGANIZED HEALTH CARE EDUCATION/TRAINING PROGRAM

## 2018-05-18 PROCEDURE — 25000003 PHARM REV CODE 250: Mod: NTX | Performed by: PHYSICIAN ASSISTANT

## 2018-05-18 PROCEDURE — 99233 SBSQ HOSP IP/OBS HIGH 50: CPT | Mod: NTX,,, | Performed by: NURSE PRACTITIONER

## 2018-05-18 PROCEDURE — 85025 COMPLETE CBC W/AUTO DIFF WBC: CPT | Mod: NTX

## 2018-05-18 PROCEDURE — 25000003 PHARM REV CODE 250: Mod: NTX | Performed by: INTERNAL MEDICINE

## 2018-05-18 PROCEDURE — 85610 PROTHROMBIN TIME: CPT | Mod: NTX

## 2018-05-18 PROCEDURE — 63600175 PHARM REV CODE 636 W HCPCS: Mod: NTX | Performed by: STUDENT IN AN ORGANIZED HEALTH CARE EDUCATION/TRAINING PROGRAM

## 2018-05-18 PROCEDURE — 90935 HEMODIALYSIS ONE EVALUATION: CPT | Mod: NTX,,, | Performed by: INTERNAL MEDICINE

## 2018-05-18 PROCEDURE — 83735 ASSAY OF MAGNESIUM: CPT | Mod: NTX

## 2018-05-18 PROCEDURE — 80053 COMPREHEN METABOLIC PANEL: CPT | Mod: NTX

## 2018-05-18 PROCEDURE — 85045 AUTOMATED RETICULOCYTE COUNT: CPT | Mod: NTX

## 2018-05-18 PROCEDURE — 63600175 PHARM REV CODE 636 W HCPCS: Mod: JG,NTX | Performed by: NURSE PRACTITIONER

## 2018-05-18 RX ORDER — SODIUM CHLORIDE 9 MG/ML
INJECTION, SOLUTION INTRAVENOUS ONCE
Status: COMPLETED | OUTPATIENT
Start: 2018-05-18 | End: 2018-05-18

## 2018-05-18 RX ORDER — SODIUM CHLORIDE 9 MG/ML
INJECTION, SOLUTION INTRAVENOUS
Status: DISCONTINUED | OUTPATIENT
Start: 2018-05-18 | End: 2018-05-23 | Stop reason: HOSPADM

## 2018-05-18 RX ADMIN — DRONABINOL 2.5 MG: 2.5 CAPSULE ORAL at 09:05

## 2018-05-18 RX ADMIN — BISACODYL 10 MG: 5 TABLET, COATED ORAL at 12:05

## 2018-05-18 RX ADMIN — SODIUM CHLORIDE 350 ML: 0.9 INJECTION, SOLUTION INTRAVENOUS at 09:05

## 2018-05-18 RX ADMIN — METOCLOPRAMIDE HYDROCHLORIDE 10 MG: 5 TABLET ORAL at 04:05

## 2018-05-18 RX ADMIN — HYDROCORTISONE SODIUM SUCCINATE 100 MG: 100 INJECTION, POWDER, FOR SOLUTION INTRAMUSCULAR; INTRAVENOUS at 01:05

## 2018-05-18 RX ADMIN — METOCLOPRAMIDE HYDROCHLORIDE 10 MG: 5 TABLET ORAL at 05:05

## 2018-05-18 RX ADMIN — OXYCODONE HYDROCHLORIDE 10 MG: 5 TABLET ORAL at 07:05

## 2018-05-18 RX ADMIN — ERYTHROPOIETIN 20000 UNITS: 20000 INJECTION, SOLUTION INTRAVENOUS; SUBCUTANEOUS at 09:05

## 2018-05-18 RX ADMIN — FAMOTIDINE 20 MG: 20 TABLET ORAL at 09:05

## 2018-05-18 RX ADMIN — SEVELAMER CARBONATE 800 MG: 800 TABLET, FILM COATED ORAL at 05:05

## 2018-05-18 RX ADMIN — HYDROCORTISONE SODIUM SUCCINATE 100 MG: 100 INJECTION, POWDER, FOR SOLUTION INTRAMUSCULAR; INTRAVENOUS at 09:05

## 2018-05-18 RX ADMIN — HYDROCORTISONE SODIUM SUCCINATE 100 MG: 100 INJECTION, POWDER, FOR SOLUTION INTRAMUSCULAR; INTRAVENOUS at 05:05

## 2018-05-18 RX ADMIN — URSODIOL 300 MG: 300 CAPSULE ORAL at 09:05

## 2018-05-18 RX ADMIN — METOCLOPRAMIDE HYDROCHLORIDE 10 MG: 5 TABLET ORAL at 11:05

## 2018-05-18 RX ADMIN — LEVOTHYROXINE SODIUM 75 MCG: 25 TABLET ORAL at 05:05

## 2018-05-18 RX ADMIN — METOCLOPRAMIDE HYDROCHLORIDE 10 MG: 5 TABLET ORAL at 09:05

## 2018-05-18 RX ADMIN — OXYCODONE HYDROCHLORIDE 10 MG: 5 TABLET ORAL at 11:05

## 2018-05-18 RX ADMIN — OXYCODONE HYDROCHLORIDE 10 MG: 5 TABLET ORAL at 06:05

## 2018-05-18 RX ADMIN — AMLODIPINE BESYLATE 5 MG: 5 TABLET ORAL at 12:05

## 2018-05-18 RX ADMIN — SEVELAMER CARBONATE 800 MG: 800 TABLET, FILM COATED ORAL at 12:05

## 2018-05-18 RX ADMIN — LEVETIRACETAM 500 MG: 500 TABLET ORAL at 12:05

## 2018-05-18 RX ADMIN — GENTAMICIN SULFATE 80 MG: 40 INJECTION, SOLUTION INTRAMUSCULAR; INTRAVENOUS at 11:05

## 2018-05-18 RX ADMIN — LEVETIRACETAM 500 MG: 500 TABLET ORAL at 09:05

## 2018-05-18 RX ADMIN — OXYCODONE HYDROCHLORIDE 10 MG: 5 TABLET ORAL at 03:05

## 2018-05-18 RX ADMIN — OXYCODONE HYDROCHLORIDE 10 MG: 5 TABLET ORAL at 10:05

## 2018-05-18 RX ADMIN — URSODIOL 300 MG: 300 CAPSULE ORAL at 12:05

## 2018-05-18 RX ADMIN — OXYCODONE HYDROCHLORIDE 10 MG: 5 TABLET ORAL at 02:05

## 2018-05-18 NOTE — SUBJECTIVE & OBJECTIVE
Scheduled Meds:   amLODIPine  5 mg Oral Daily    bisacodyl  10 mg Oral Daily    dronabinol  2.5 mg Oral QHS    epoetin maurisio (PROCRIT) injection  20,000 Units Intravenous Every Mon, Fri    ergocalciferol  50,000 Units Oral Q7 Days    famotidine  20 mg Oral QHS    hydrocortisone sodium succinate  100 mg Intravenous Q8H    levETIRAcetam  500 mg Oral BID    levothyroxine  75 mcg Oral Before breakfast    lidocaine  1 patch Transdermal Q24H    metoclopramide HCl  10 mg Oral QID (AC & HS)    sevelamer carbonate  800 mg Oral TID WM    ursodiol  300 mg Oral BID     Continuous Infusions:  PRN Meds:sodium chloride, sodium chloride 0.9%, sodium chloride 0.9%, sodium chloride 0.9%, bisacodyl, gentamicin, omnipaque, ondansetron, oxyCODONE, prochlorperazine, ramelteon, sodium chloride 0.9%    Review of Systems   Constitutional: Positive for activity change and appetite change. Negative for chills, fatigue and fever.   Respiratory: Negative for cough and shortness of breath.    Cardiovascular: Negative for chest pain and leg swelling.   Gastrointestinal: Positive for abdominal distention, abdominal pain and constipation. Negative for nausea and vomiting.   Genitourinary: Positive for decreased urine volume and flank pain.   Musculoskeletal: Negative for arthralgias, back pain and myalgias.   Skin: Negative for color change and wound.   Allergic/Immunologic: Positive for immunocompromised state.   Neurological: Positive for weakness. Negative for dizziness, syncope and headaches.   Psychiatric/Behavioral: Positive for dysphoric mood. The patient is nervous/anxious.      Objective:     Vital Signs (Most Recent):  Temp: 98 °F (36.7 °C) (05/18/18 1140)  Pulse: 66 (05/18/18 1140)  Resp: 18 (05/18/18 1140)  BP: (!) 141/89 (05/18/18 1140)  SpO2: 99 % (05/18/18 1140) Vital Signs (24h Range):  Temp:  [97.7 °F (36.5 °C)-98.6 °F (37 °C)] 98 °F (36.7 °C)  Pulse:  [50-89] 66  Resp:  [16-20] 18  SpO2:  [97 %-99 %] 99 %  BP:  (128-160)/() 141/89     Weight: 59.5 kg (131 lb 2.8 oz)  Body mass index is 21.17 kg/m².    Intake/Output - Last 3 Shifts       05/16 0700 - 05/17 0659 05/17 0700 - 05/18 0659 05/18 0700 - 05/19 0659    P.O. 270 1560     I.V. (mL/kg) 0 (0)      Other 0  700    Total Intake(mL/kg) 270 (4.6) 1560 (26.2) 700 (11.8)    Urine (mL/kg/hr) 640 (0.5) 710 (0.5)     Other   2200 (4.4)    Total Output     Net -370 +850 -1500           Urine Occurrence 0 x 1 x     Stool Occurrence 0 x 0 x           Physical Exam   Constitutional: He is oriented to person, place, and time. He appears well-developed. He is cooperative.   Hand and temporal muscle wasting   HENT:   Head: Normocephalic and atraumatic.   Eyes: Pupils are equal, round, and reactive to light.   Neck: Normal range of motion.   Cardiovascular: Normal rate, regular rhythm, normal heart sounds, intact distal pulses and normal pulses.    Pulmonary/Chest: Effort normal. No respiratory distress. He has decreased breath sounds in the right lower field and the left lower field. He has no rales.   Abdominal: Soft. Bowel sounds are normal. He exhibits distension. There is generalized tenderness.   Musculoskeletal: Normal range of motion. He exhibits no edema.   Neurological: He is alert and oriented to person, place, and time.   Skin: Skin is warm and dry. There is pallor.   Psychiatric: He has a normal mood and affect. His behavior is normal. Judgment and thought content normal.   Nursing note and vitals reviewed.      Laboratory:  Immunosuppressants     None        CBC:     Recent Labs  Lab 05/18/18 0422   WBC 2.80*   RBC 2.44*   HGB 7.6*   HCT 22.8*   PLT 7*   MCV 93   MCH 31.1*   MCHC 33.3     CMP:     Recent Labs  Lab 05/18/18 0422   *   CALCIUM 8.6*   ALBUMIN 2.4*   PROT 6.4   *   K 4.6   CO2 18*      BUN 57*   CREATININE 5.0*   ALKPHOS 135   ALT <5*   AST 13   BILITOT 1.7*     Coagulation:     Recent Labs  Lab 05/18/18  6936   INR 1.0      Labs within the past 24 hours have been reviewed.    Diagnostic Results:  I have personally reviewed all pertinent imaging studies.

## 2018-05-18 NOTE — PROGRESS NOTES
[facility]  Liver Transplant  Progress Note    Patient Name: Jhonny Diana  MRN: [id number]  Admission Date: 2018  Hospital Length of Stay: 9 days  Code Status: Full Code  Primary Care Provider: Primary Doctor No  Post-Operative Day: 211    ORGAN:   LIVER  Disease Etiology: Acute Alcoholic Hepatitis  Donor Type:    - Brain Death  CDC High Risk:   No  Donor CMV Status:   Donor CMV Status: Positive  Donor HBcAB:   Negative  Donor HCV Status:   Negative  Whole or Partial: Whole Liver  Biliary Anastomosis: End to End  Arterial Anatomy: Standard  Subjective:     History of Present Illness:  Mr. 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  S/p OLTX 10/19/2017 (Liver) with steroid induction. Post op course with multiple complications to include biliary stricture, FUO, ESRD, and chronic abdominal pain. Recently discharged on  after an ERCP. Reports that since discharge he has had fevers of 100.4 to 100.8. These fevers resolve on their own without medication. Also reports decreased appetite and fatigue. Nausea and abdominal pain are baseline and unchanged from prior to discharge. Normal bowel movements and passing gas. Receiving dialysis Monday and Friday. On admission, patient was afebrile and hemodynamically stable. Labs were stable from prior except tbili which is up to 3.9 from 3.2 yesterday.     Hospital Course:  Interval History: no acute events overnight. Hematology consulted for worsening thrombocytopenia.  Plts with some improvement to 7,000 this AM.  Continue to hold cya over the weekend and monitor platelets.  If no improvement by Monday restart cya and treat thrombocytopenia with steroids (1mg/kg).  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 Biopsy pain improved with bupivicaine and apply lidocaine patch.  However, bradycardic this am will obtain EKG.   HIT negative. CT chest/abd/pelvis to evaluate lymph nodes 5/16 with no lymphadenopathy. Hematology work up still in process. Prealbumin 10 on 5/14, previously 17 a month ago, continue to encourage oral intake.  Will recheck prealbumin Monday 5/21.    Scheduled Meds:   amLODIPine  5 mg Oral Daily    bisacodyl  10 mg Oral Daily    dronabinol  2.5 mg Oral QHS    epoetin maurisio (PROCRIT) injection  20,000 Units Intravenous Every Mon, Fri    ergocalciferol  50,000 Units Oral Q7 Days    famotidine  20 mg Oral QHS    hydrocortisone sodium succinate  100 mg Intravenous Q8H    levETIRAcetam  500 mg Oral BID    levothyroxine  75 mcg Oral Before breakfast    lidocaine  1 patch Transdermal Q24H    metoclopramide HCl  10 mg Oral QID (AC & HS)    sevelamer carbonate  800 mg Oral TID WM    ursodiol  300 mg Oral BID     Continuous Infusions:  PRN Meds:sodium chloride, sodium chloride 0.9%, sodium chloride 0.9%, sodium chloride 0.9%, bisacodyl, gentamicin, omnipaque, ondansetron, oxyCODONE, prochlorperazine, ramelteon, sodium chloride 0.9%    Review of Systems   Constitutional: Positive for activity change and appetite change. Negative for chills, fatigue and fever.   Respiratory: Negative for cough and shortness of breath.    Cardiovascular: Negative for chest pain and leg swelling.   Gastrointestinal: Positive for abdominal distention, abdominal pain and constipation. Negative for nausea and vomiting.   Genitourinary: Positive for decreased urine volume and flank pain.   Musculoskeletal: Negative for arthralgias, back pain and myalgias.   Skin: Negative for color change and wound.   Allergic/Immunologic: Positive for immunocompromised state.   Neurological: Positive for weakness. Negative for dizziness, syncope and headaches.   Psychiatric/Behavioral: Positive for dysphoric mood. The patient is nervous/anxious.      Objective:     Vital Signs (Most Recent):  Temp: 98 °F (36.7 °C) (05/18/18 1140)  Pulse: 66 (05/18/18  1140)  Resp: 18 (05/18/18 1140)  BP: (!) 141/89 (05/18/18 1140)  SpO2: 99 % (05/18/18 1140) Vital Signs (24h Range):  Temp:  [97.7 °F (36.5 °C)-98.6 °F (37 °C)] 98 °F (36.7 °C)  Pulse:  [50-89] 66  Resp:  [16-20] 18  SpO2:  [97 %-99 %] 99 %  BP: (128-160)/() 141/89     Weight: 59.5 kg (131 lb 2.8 oz)  Body mass index is 21.17 kg/m².    Intake/Output - Last 3 Shifts       05/16 0700 - 05/17 0659 05/17 0700 - 05/18 0659 05/18 0700 - 05/19 0659    P.O. 270 1560     I.V. (mL/kg) 0 (0)      Other 0  700    Total Intake(mL/kg) 270 (4.6) 1560 (26.2) 700 (11.8)    Urine (mL/kg/hr) 640 (0.5) 710 (0.5)     Other   2200 (4.4)    Total Output     Net -370 +850 -1500           Urine Occurrence 0 x 1 x     Stool Occurrence 0 x 0 x           Physical Exam   Constitutional: He is oriented to person, place, and time. He appears well-developed. He is cooperative.   Hand and temporal muscle wasting   HENT:   Head: Normocephalic and atraumatic.   Eyes: Pupils are equal, round, and reactive to light.   Neck: Normal range of motion.   Cardiovascular: Normal rate, regular rhythm, normal heart sounds, intact distal pulses and normal pulses.    Pulmonary/Chest: Effort normal. No respiratory distress. He has decreased breath sounds in the right lower field and the left lower field. He has no rales.   Abdominal: Soft. Bowel sounds are normal. He exhibits distension. There is generalized tenderness.   Musculoskeletal: Normal range of motion. He exhibits no edema.   Neurological: He is alert and oriented to person, place, and time.   Skin: Skin is warm and dry. There is pallor.   Psychiatric: He has a normal mood and affect. His behavior is normal. Judgment and thought content normal.   Nursing note and vitals reviewed.      Laboratory:  Immunosuppressants     None        CBC:     Recent Labs  Lab 05/18/18  0422   WBC 2.80*   RBC 2.44*   HGB 7.6*   HCT 22.8*   PLT 7*   MCV 93   MCH 31.1*   MCHC 33.3     CMP:     Recent  Labs  Lab 05/18/18  0422   *   CALCIUM 8.6*   ALBUMIN 2.4*   PROT 6.4   *   K 4.6   CO2 18*      BUN 57*   CREATININE 5.0*   ALKPHOS 135   ALT <5*   AST 13   BILITOT 1.7*     Coagulation:     Recent Labs  Lab 05/18/18 0422   INR 1.0     Labs within the past 24 hours have been reviewed.    Diagnostic Results:  I have personally reviewed all pertinent imaging studies.    Assessment/Plan:     Other pancytopenia    - Hem/onc consulted for worsening thrombocytopenia despite platelet transfusion  - attempted bone marrow biopsy 5/14 without success due to pain, able to obtain small dry sample, heme to attempt to run test on biopsy  - HIT panel negative.   - CT chest/abd/pelvis 5/15 to evaluate lymph nodes - reviewed with no lymphadenopathy  - will follow Heme recommendations - stress dose steroids & d/c cya, if this is unsuccessful transition to treatment steroids monday  - bone marrow biopsy site pain improved with application of lidocaine patch and inject bupivacaine        Severe protein-calorie malnutrition    - continue dronabinol for appetite stimulant  - po supplements  - appetite improving slowly  - dietary following  - prealbumin 10 on 5/14, previously 17 a month ago, recheck Monday 5/21        Anemia of chronic renal failure    - h/h chronically low  - continue epoetin  - hemolysis labs concerning - hematology consulted & following  - H/H improved s/p 1 unit of PRBC 5/12.   - transfused 1 unit prbc 5/16 with good response  - monitor cbc daily        Biliary stricture of transplanted liver    - s/p ERCP 5/4  - stent in place per imaging  - PBS consulted for possible ERCP - not recommending ERCP for now  - bili now improving, monitor        S/P liver transplant    - LFTs stable  - liver u/s reviewed - unremarkable  - monitor        Constipation    - suppository 5/17  - continue to decrease pain medication  - continue bisacodyl orally          Long-term use of immunosuppressant medication    -  See prophylactic immunosuppression        Prophylactic immunotherapy    - continue cyclosporine  - monitor cya levels daily and adjust for therapeutic dose        Thrombocytopenia    - see other pancytopenia        Hyperphosphatemia    - continue sevelamer carbonate  - monitor labs daily        Vitamin D deficiency    - continue ergo replacement          ESRD Monday/Friday    - nephrology following  - making some urine, however not clearing  - Monday/friday dialysis schedule            VTE Risk Mitigation         Ordered     IP VTE HIGH RISK PATIENT  Once      05/09/18 0044          The patients clinical status was discussed at multidisplinary rounds, involving transplant surgery, transplant medicine, pharmacy, nursing, nutrition, and social work    Discharge Planning:  Monitor kidney function --> may need outpt HD  Monitor HH needs vs rehab for deconditioned status      Seda Camacho, NP  Liver Transplant  [facility]

## 2018-05-18 NOTE — ASSESSMENT & PLAN NOTE
- continue dronabinol for appetite stimulant  - po supplements  - appetite improving slowly  - dietary following  - prealbumin 10 on 5/14, previously 17 a month ago, recheck Monday 5/21

## 2018-05-18 NOTE — PLAN OF CARE
Problem: Patient Care Overview  Goal: Plan of Care Review  Outcome: Ongoing (interventions implemented as appropriate)  Pt aao x 4. VSS. Bed in low locked pos, call light within reach. Pt ambulates w/ independence. Lidocaine to biopsy site. Plt 4, cyclosporine dec, steroids started. Prn oxycodone q4.  HD Mon and Fri. Am labs ordered. Mother at bedside.

## 2018-05-18 NOTE — PROGRESS NOTES
Dialysis completed. Right IJ tunneled catheter flushed with normal saline, gentamicin 40 mg plus normal saline to fill volumes instilled to each lumen of catheter, capped and taped. Patient dialyzed for 3.5 hours with fluid removal of 1.5 liters. Tolerated well with stable vital signs.

## 2018-05-18 NOTE — PROGRESS NOTES
Patient received from floor with report from Rizwana Garcia.  Maintenance dialysis began per orders via right IJ tunneled catheter.

## 2018-05-18 NOTE — ASSESSMENT & PLAN NOTE
- s/p ERCP 5/4  - stent in place per imaging  - PBS consulted for possible ERCP - not recommending ERCP for now  - bili now improving, monitor

## 2018-05-18 NOTE — ASSESSMENT & PLAN NOTE
- Hem/onc consulted for worsening thrombocytopenia despite platelet transfusion  - attempted bone marrow biopsy 5/14 without success due to pain, able to obtain small dry sample, heme to attempt to run test on biopsy  - HIT panel negative.   - CT chest/abd/pelvis 5/15 to evaluate lymph nodes - reviewed with no lymphadenopathy  - will follow Heme recommendations - stress dose steroids & d/c cya, if this is unsuccessful transition to treatment steroids monday  - bone marrow biopsy site pain improved with application of lidocaine patch and inject bupivacaine

## 2018-05-19 LAB
ALBUMIN SERPL BCP-MCNC: 2.6 G/DL
ALP SERPL-CCNC: 152 U/L
ALT SERPL W/O P-5'-P-CCNC: <5 U/L
ANION GAP SERPL CALC-SCNC: 13 MMOL/L
AST SERPL-CCNC: 14 U/L
BASOPHILS # BLD AUTO: 0 K/UL
BASOPHILS NFR BLD: 0 %
BILIRUB SERPL-MCNC: 1.7 MG/DL
BUN SERPL-MCNC: 32 MG/DL
CALCIUM SERPL-MCNC: 8.6 MG/DL
CHLORIDE SERPL-SCNC: 98 MMOL/L
CO2 SERPL-SCNC: 22 MMOL/L
CREAT SERPL-MCNC: 3.2 MG/DL
CYCLOSPORINE BLD LC/MS/MS-MCNC: 16 NG/ML
DIFFERENTIAL METHOD: ABNORMAL
EOSINOPHIL # BLD AUTO: 0 K/UL
EOSINOPHIL NFR BLD: 0 %
ERYTHROCYTE [DISTWIDTH] IN BLOOD BY AUTOMATED COUNT: 17.4 %
EST. GFR  (AFRICAN AMERICAN): 28.9 ML/MIN/1.73 M^2
EST. GFR  (NON AFRICAN AMERICAN): 25 ML/MIN/1.73 M^2
GLUCOSE SERPL-MCNC: 134 MG/DL
HCT VFR BLD AUTO: 24.8 %
HGB BLD-MCNC: 8.2 G/DL
IMM GRANULOCYTES # BLD AUTO: 0.05 K/UL
IMM GRANULOCYTES NFR BLD AUTO: 1.5 %
INR PPP: 1
LYMPHOCYTES # BLD AUTO: 0.6 K/UL
LYMPHOCYTES NFR BLD: 17 %
MAGNESIUM SERPL-MCNC: 1.7 MG/DL
MCH RBC QN AUTO: 30.9 PG
MCHC RBC AUTO-ENTMCNC: 33.1 G/DL
MCV RBC AUTO: 94 FL
MONOCYTES # BLD AUTO: 0.4 K/UL
MONOCYTES NFR BLD: 11.1 %
NEUTROPHILS # BLD AUTO: 2.3 K/UL
NEUTROPHILS NFR BLD: 70.4 %
NRBC BLD-RTO: 0 /100 WBC
PHOSPHATE SERPL-MCNC: 4.4 MG/DL
PLATELET # BLD AUTO: 15 K/UL
PMV BLD AUTO: ABNORMAL FL
POTASSIUM SERPL-SCNC: 3.7 MMOL/L
PROT SERPL-MCNC: 6.7 G/DL
PROTHROMBIN TIME: 10.6 SEC
RBC # BLD AUTO: 2.65 M/UL
RETICS/RBC NFR AUTO: 5 %
SODIUM SERPL-SCNC: 133 MMOL/L
WBC # BLD AUTO: 3.24 K/UL

## 2018-05-19 PROCEDURE — 25000003 PHARM REV CODE 250: Mod: NTX | Performed by: NURSE PRACTITIONER

## 2018-05-19 PROCEDURE — 80158 DRUG ASSAY CYCLOSPORINE: CPT | Mod: NTX

## 2018-05-19 PROCEDURE — 85610 PROTHROMBIN TIME: CPT | Mod: NTX

## 2018-05-19 PROCEDURE — 83735 ASSAY OF MAGNESIUM: CPT | Mod: NTX

## 2018-05-19 PROCEDURE — 36415 COLL VENOUS BLD VENIPUNCTURE: CPT | Mod: NTX

## 2018-05-19 PROCEDURE — 84100 ASSAY OF PHOSPHORUS: CPT | Mod: NTX

## 2018-05-19 PROCEDURE — 85025 COMPLETE CBC W/AUTO DIFF WBC: CPT | Mod: NTX

## 2018-05-19 PROCEDURE — 80053 COMPREHEN METABOLIC PANEL: CPT | Mod: NTX

## 2018-05-19 PROCEDURE — 20600001 HC STEP DOWN PRIVATE ROOM: Mod: NTX

## 2018-05-19 PROCEDURE — 25000003 PHARM REV CODE 250: Mod: NTX | Performed by: STUDENT IN AN ORGANIZED HEALTH CARE EDUCATION/TRAINING PROGRAM

## 2018-05-19 PROCEDURE — 63600175 PHARM REV CODE 636 W HCPCS: Mod: NTX | Performed by: NURSE PRACTITIONER

## 2018-05-19 PROCEDURE — 99233 SBSQ HOSP IP/OBS HIGH 50: CPT | Mod: NTX,,, | Performed by: NURSE PRACTITIONER

## 2018-05-19 PROCEDURE — 63600175 PHARM REV CODE 636 W HCPCS: Mod: NTX | Performed by: STUDENT IN AN ORGANIZED HEALTH CARE EDUCATION/TRAINING PROGRAM

## 2018-05-19 PROCEDURE — 85045 AUTOMATED RETICULOCYTE COUNT: CPT | Mod: NTX

## 2018-05-19 PROCEDURE — 25000003 PHARM REV CODE 250: Mod: NTX | Performed by: PHYSICIAN ASSISTANT

## 2018-05-19 RX ADMIN — OXYCODONE HYDROCHLORIDE 10 MG: 5 TABLET ORAL at 08:05

## 2018-05-19 RX ADMIN — HYDROCORTISONE SODIUM SUCCINATE 100 MG: 100 INJECTION, POWDER, FOR SOLUTION INTRAMUSCULAR; INTRAVENOUS at 09:05

## 2018-05-19 RX ADMIN — DRONABINOL 2.5 MG: 2.5 CAPSULE ORAL at 09:05

## 2018-05-19 RX ADMIN — AMLODIPINE BESYLATE 5 MG: 5 TABLET ORAL at 08:05

## 2018-05-19 RX ADMIN — HYDROCORTISONE SODIUM SUCCINATE 100 MG: 100 INJECTION, POWDER, FOR SOLUTION INTRAMUSCULAR; INTRAVENOUS at 05:05

## 2018-05-19 RX ADMIN — LEVETIRACETAM 500 MG: 500 TABLET ORAL at 08:05

## 2018-05-19 RX ADMIN — OXYCODONE HYDROCHLORIDE 10 MG: 5 TABLET ORAL at 12:05

## 2018-05-19 RX ADMIN — OXYCODONE HYDROCHLORIDE 10 MG: 5 TABLET ORAL at 03:05

## 2018-05-19 RX ADMIN — LEVETIRACETAM 500 MG: 500 TABLET ORAL at 09:05

## 2018-05-19 RX ADMIN — LEVOTHYROXINE SODIUM 75 MCG: 25 TABLET ORAL at 05:05

## 2018-05-19 RX ADMIN — METOCLOPRAMIDE HYDROCHLORIDE 10 MG: 5 TABLET ORAL at 04:05

## 2018-05-19 RX ADMIN — SEVELAMER CARBONATE 800 MG: 800 TABLET, FILM COATED ORAL at 08:05

## 2018-05-19 RX ADMIN — METOCLOPRAMIDE HYDROCHLORIDE 10 MG: 5 TABLET ORAL at 05:05

## 2018-05-19 RX ADMIN — METOCLOPRAMIDE HYDROCHLORIDE 10 MG: 5 TABLET ORAL at 12:05

## 2018-05-19 RX ADMIN — SEVELAMER CARBONATE 800 MG: 800 TABLET, FILM COATED ORAL at 04:05

## 2018-05-19 RX ADMIN — URSODIOL 300 MG: 300 CAPSULE ORAL at 08:05

## 2018-05-19 RX ADMIN — SEVELAMER CARBONATE 800 MG: 800 TABLET, FILM COATED ORAL at 12:05

## 2018-05-19 RX ADMIN — OXYCODONE HYDROCHLORIDE 10 MG: 5 TABLET ORAL at 09:05

## 2018-05-19 RX ADMIN — BISACODYL 10 MG: 5 TABLET, COATED ORAL at 08:05

## 2018-05-19 RX ADMIN — OXYCODONE HYDROCHLORIDE 10 MG: 5 TABLET ORAL at 04:05

## 2018-05-19 RX ADMIN — FAMOTIDINE 20 MG: 20 TABLET ORAL at 09:05

## 2018-05-19 RX ADMIN — URSODIOL 300 MG: 300 CAPSULE ORAL at 09:05

## 2018-05-19 RX ADMIN — METOCLOPRAMIDE HYDROCHLORIDE 10 MG: 5 TABLET ORAL at 09:05

## 2018-05-19 RX ADMIN — HYDROCORTISONE SODIUM SUCCINATE 100 MG: 100 INJECTION, POWDER, FOR SOLUTION INTRAMUSCULAR; INTRAVENOUS at 01:05

## 2018-05-19 NOTE — PROGRESS NOTES
[facility]  Liver Transplant  Progress Note    Patient Name: Jhonny Diana  MRN: [id number]  Admission Date: 2018  Hospital Length of Stay: 10 days  Code Status: Full Code  Primary Care Provider: Primary Doctor No  Post-Operative Day: 212    ORGAN:   LIVER  Disease Etiology: Acute Alcoholic Hepatitis  Donor Type:    - Brain Death  CDC High Risk:   No  Donor CMV Status:   Donor CMV Status: Positive  Donor HBcAB:   Negative  Donor HCV Status:   Negative  Whole or Partial: Whole Liver  Biliary Anastomosis: End to End  Arterial Anatomy: Standard  Subjective:     History of Present Illness:  Mr. 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  S/p OLTX 10/19/2017 (Liver) with steroid induction. Post op course with multiple complications to include biliary stricture, FUO, ESRD, and chronic abdominal pain. Recently discharged on  after an ERCP. Reports that since discharge he has had fevers of 100.4 to 100.8. These fevers resolve on their own without medication. Also reports decreased appetite and fatigue. Nausea and abdominal pain are baseline and unchanged from prior to discharge. Normal bowel movements and passing gas. Receiving dialysis Monday and Friday. On admission, patient was afebrile and hemodynamically stable. Labs were stable from prior except tbili which is up to 3.9 from 3.2 yesterday.     Hospital Course:  Interval History: no acute events overnight. Hematology consulted for worsening thrombocytopenia.  Plts improving slowly- 15 today.  Continue to hold cya.  If no improvement by Monday restart cya and treat thrombocytopenia with steroids (1mg/kg).  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 Biopsy pain improved with bupivicaine and apply lidocaine patch.  However, bradycardic this am will obtain EKG.  HIT negative. CT chest/abd/pelvis to evaluate  lymph nodes 5/16 with no lymphadenopathy. Hematology work up still in process. Prealbumin 10 on 5/14, previously 17 a month ago, continue to encourage oral intake.  Will recheck prealbumin Monday 5/21.    Scheduled Meds:   amLODIPine  5 mg Oral Daily    bisacodyl  10 mg Oral Daily    dronabinol  2.5 mg Oral QHS    epoetin maurisio (PROCRIT) injection  20,000 Units Intravenous Every Mon, Fri    ergocalciferol  50,000 Units Oral Q7 Days    famotidine  20 mg Oral QHS    hydrocortisone sodium succinate  100 mg Intravenous Q8H    levETIRAcetam  500 mg Oral BID    levothyroxine  75 mcg Oral Before breakfast    lidocaine  1 patch Transdermal Q24H    metoclopramide HCl  10 mg Oral QID (AC & HS)    sevelamer carbonate  800 mg Oral TID WM    ursodiol  300 mg Oral BID     Continuous Infusions:  PRN Meds:sodium chloride, sodium chloride 0.9%, sodium chloride 0.9%, sodium chloride 0.9%, bisacodyl, gentamicin, omnipaque, ondansetron, oxyCODONE, prochlorperazine, ramelteon, sodium chloride 0.9%    Review of Systems   Constitutional: Positive for activity change and appetite change. Negative for chills, fatigue and fever.   Respiratory: Negative for cough and shortness of breath.    Cardiovascular: Negative for chest pain and leg swelling.   Gastrointestinal: Positive for abdominal distention, abdominal pain and constipation. Negative for nausea and vomiting.   Genitourinary: Positive for decreased urine volume and flank pain.   Musculoskeletal: Negative for arthralgias, back pain and myalgias.   Skin: Negative for color change and wound.   Allergic/Immunologic: Positive for immunocompromised state.   Neurological: Positive for weakness. Negative for dizziness, syncope and headaches.   Psychiatric/Behavioral: Positive for dysphoric mood. The patient is nervous/anxious.      Objective:     Vital Signs (Most Recent):  Temp: 98.4 °F (36.9 °C) (05/19/18 0750)  Pulse: 60 (05/19/18 0750)  Resp: 16 (05/19/18 0750)  BP: (!) 153/91  (05/19/18 0750)  SpO2: 97 % (05/19/18 0750) Vital Signs (24h Range):  Temp:  [98 °F (36.7 °C)-99.2 °F (37.3 °C)] 98.4 °F (36.9 °C)  Pulse:  [54-84] 60  Resp:  [15-18] 16  SpO2:  [94 %-99 %] 97 %  BP: (122-153)/(77-91) 153/91     Weight: 59.5 kg (131 lb 2.1 oz)  Body mass index is 21.16 kg/m².    Intake/Output - Last 3 Shifts       05/17 0700 - 05/18 0659 05/18 0700 - 05/19 0659 05/19 0700 - 05/20 0659    P.O. 1560 1950     I.V. (mL/kg)  0 (0)     Other  700     Total Intake(mL/kg) 1560 (26.2) 2650 (44.5)     Urine (mL/kg/hr) 710 (0.5) 375 (0.3) 410 (1.9)    Emesis/NG output  0 (0)     Other  2200 (1.5)     Stool  0 (0)     Blood  0 (0)     Total Output 710 2575 410    Net +850 +75 -410           Urine Occurrence 1 x 0 x     Stool Occurrence 0 x 0 x     Emesis Occurrence  0 x           Physical Exam   Constitutional: He is oriented to person, place, and time. He appears well-developed. He is cooperative.   Hand and temporal muscle wasting   HENT:   Head: Normocephalic and atraumatic.   Eyes: Pupils are equal, round, and reactive to light.   Neck: Normal range of motion.   Cardiovascular: Normal rate, regular rhythm, normal heart sounds, intact distal pulses and normal pulses.    Pulmonary/Chest: Effort normal. No respiratory distress. He has decreased breath sounds in the right lower field and the left lower field. He has no rales.   Abdominal: Soft. Bowel sounds are normal. He exhibits distension. There is generalized tenderness.   Musculoskeletal: Normal range of motion. He exhibits no edema.   Neurological: He is alert and oriented to person, place, and time.   Skin: Skin is warm and dry. There is pallor.   Psychiatric: He has a normal mood and affect. His behavior is normal. Judgment and thought content normal.   Nursing note and vitals reviewed.      Laboratory:  Immunosuppressants     None        CBC:     Recent Labs  Lab 05/19/18  0433   WBC 3.24*   RBC 2.65*   HGB 8.2*   HCT 24.8*   PLT 15*   MCV 94   MCH  30.9   MCHC 33.1     CMP:     Recent Labs  Lab 05/19/18  0434   *   CALCIUM 8.6*   ALBUMIN 2.6*   PROT 6.7   *   K 3.7   CO2 22*   CL 98   BUN 32*   CREATININE 3.2*   ALKPHOS 152*   ALT <5*   AST 14   BILITOT 1.7*     Coagulation:     Recent Labs  Lab 05/19/18  0434   INR 1.0     Labs within the past 24 hours have been reviewed.    Diagnostic Results:  I have personally reviewed all pertinent imaging studies.    Assessment/Plan:     Seizure    - no seizure activity noted          S/P liver transplant    - LFTs stable  - liver u/s reviewed - unremarkable  - monitor        Prophylactic immunotherapy    - cyclosporine on HOLD- awaiting improvement in plt.  - monitor cya levels daily         Long-term use of immunosuppressant medication    - See prophylactic immunosuppression.        At risk for opportunistic infections    - monitor.          Thrombocytopenia    - see other pancytopenia.  PLt 15 today.        Other pancytopenia    - Hem/onc consulted for worsening thrombocytopenia despite platelet transfusion  - attempted bone marrow biopsy 5/14 without success due to pain, able to obtain small dry sample, heme to attempt to run test on biopsy  - HIT panel negative.   - CT chest/abd/pelvis 5/15 to evaluate lymph nodes - reviewed with no lymphadenopathy  - will follow Heme recommendations - stress dose steroids & d/c cya, if this is unsuccessful transition to treatment steroids monday  - bone marrow biopsy site pain improved with application of lidocaine patch and inject bupivacaine        Anemia of chronic renal failure    - h/h chronically low  - continue epoetin  - hemolysis labs concerning - hematology consulted & following  - H/H improved s/p 1 unit of PRBC 5/12.   - transfused 1 unit prbc 5/16 with good response  - monitor cbc daily        Hyperphosphatemia    - continue sevelamer carbonate  - monitor labs daily        Vitamin D deficiency    - continue ergo replacement          Constipation    -  suppository 5/17- pt refused.  - continue to decrease pain medication  - continue bisacodyl orally          ESRD Monday/Friday    - nephrology following  - making some urine, however not clearing  - Monday/friday dialysis schedule        Severe protein-calorie malnutrition    - continue dronabinol for appetite stimulant  - po supplements  - appetite improving slowly  - dietary following  - prealbumin 10 on 5/14, previously 17 a month ago, recheck Monday 5/21        Biliary stricture of transplanted liver    - s/p ERCP 5/4  - stent in place per imaging  - PBS consulted for possible ERCP - not recommending ERCP for now  - bili now improving, monitor            VTE Risk Mitigation         Ordered     IP VTE HIGH RISK PATIENT  Once      05/09/18 0044          The patients clinical status was discussed at multidisplinary rounds, involving transplant surgery, transplant medicine, pharmacy, nursing, nutrition, and social work    Discharge Planning:  No plan for d/c    Norma Blackwood NP  Liver Transplant  [facility]

## 2018-05-19 NOTE — PLAN OF CARE
Problem: Patient Care Overview  Goal: Plan of Care Review  Outcome: Ongoing (interventions implemented as appropriate)  Pt aao x4. He is up independent and ambulates frequently. Call bell remains within reach. His mother is at bedside. Platelets up to 82858 today from 7000 yesterday. Pt eating well today. Has not had bm in about 5 days. He denies nausea, vomiting, or feeling constipated. Will continue to monitor.

## 2018-05-19 NOTE — SUBJECTIVE & OBJECTIVE
Scheduled Meds:   amLODIPine  5 mg Oral Daily    bisacodyl  10 mg Oral Daily    dronabinol  2.5 mg Oral QHS    epoetin maurisio (PROCRIT) injection  20,000 Units Intravenous Every Mon, Fri    ergocalciferol  50,000 Units Oral Q7 Days    famotidine  20 mg Oral QHS    hydrocortisone sodium succinate  100 mg Intravenous Q8H    levETIRAcetam  500 mg Oral BID    levothyroxine  75 mcg Oral Before breakfast    lidocaine  1 patch Transdermal Q24H    metoclopramide HCl  10 mg Oral QID (AC & HS)    sevelamer carbonate  800 mg Oral TID WM    ursodiol  300 mg Oral BID     Continuous Infusions:  PRN Meds:sodium chloride, sodium chloride 0.9%, sodium chloride 0.9%, sodium chloride 0.9%, bisacodyl, gentamicin, omnipaque, ondansetron, oxyCODONE, prochlorperazine, ramelteon, sodium chloride 0.9%    Review of Systems   Constitutional: Positive for activity change and appetite change. Negative for chills, fatigue and fever.   Respiratory: Negative for cough and shortness of breath.    Cardiovascular: Negative for chest pain and leg swelling.   Gastrointestinal: Positive for abdominal distention, abdominal pain and constipation. Negative for nausea and vomiting.   Genitourinary: Positive for decreased urine volume and flank pain.   Musculoskeletal: Negative for arthralgias, back pain and myalgias.   Skin: Negative for color change and wound.   Allergic/Immunologic: Positive for immunocompromised state.   Neurological: Positive for weakness. Negative for dizziness, syncope and headaches.   Psychiatric/Behavioral: Positive for dysphoric mood. The patient is nervous/anxious.      Objective:     Vital Signs (Most Recent):  Temp: 98.4 °F (36.9 °C) (05/19/18 0750)  Pulse: 60 (05/19/18 0750)  Resp: 16 (05/19/18 0750)  BP: (!) 153/91 (05/19/18 0750)  SpO2: 97 % (05/19/18 0750) Vital Signs (24h Range):  Temp:  [98 °F (36.7 °C)-99.2 °F (37.3 °C)] 98.4 °F (36.9 °C)  Pulse:  [54-84] 60  Resp:  [15-18] 16  SpO2:  [94 %-99 %] 97 %  BP:  (122-153)/(77-91) 153/91     Weight: 59.5 kg (131 lb 2.1 oz)  Body mass index is 21.16 kg/m².    Intake/Output - Last 3 Shifts       05/17 0700 - 05/18 0659 05/18 0700 - 05/19 0659 05/19 0700 - 05/20 0659    P.O. 1560 1950     I.V. (mL/kg)  0 (0)     Other  700     Total Intake(mL/kg) 1560 (26.2) 2650 (44.5)     Urine (mL/kg/hr) 710 (0.5) 375 (0.3) 410 (1.9)    Emesis/NG output  0 (0)     Other  2200 (1.5)     Stool  0 (0)     Blood  0 (0)     Total Output 710 2575 410    Net +850 +75 -410           Urine Occurrence 1 x 0 x     Stool Occurrence 0 x 0 x     Emesis Occurrence  0 x           Physical Exam   Constitutional: He is oriented to person, place, and time. He appears well-developed. He is cooperative.   Hand and temporal muscle wasting   HENT:   Head: Normocephalic and atraumatic.   Eyes: Pupils are equal, round, and reactive to light.   Neck: Normal range of motion.   Cardiovascular: Normal rate, regular rhythm, normal heart sounds, intact distal pulses and normal pulses.    Pulmonary/Chest: Effort normal. No respiratory distress. He has decreased breath sounds in the right lower field and the left lower field. He has no rales.   Abdominal: Soft. Bowel sounds are normal. He exhibits distension. There is generalized tenderness.   Musculoskeletal: Normal range of motion. He exhibits no edema.   Neurological: He is alert and oriented to person, place, and time.   Skin: Skin is warm and dry. There is pallor.   Psychiatric: He has a normal mood and affect. His behavior is normal. Judgment and thought content normal.   Nursing note and vitals reviewed.      Laboratory:  Immunosuppressants     None        CBC:     Recent Labs  Lab 05/19/18  0433   WBC 3.24*   RBC 2.65*   HGB 8.2*   HCT 24.8*   PLT 15*   MCV 94   MCH 30.9   MCHC 33.1     CMP:     Recent Labs  Lab 05/19/18  0434   *   CALCIUM 8.6*   ALBUMIN 2.6*   PROT 6.7   *   K 3.7   CO2 22*   CL 98   BUN 32*   CREATININE 3.2*   ALKPHOS 152*   ALT <5*    AST 14   BILITOT 1.7*     Coagulation:     Recent Labs  Lab 05/19/18  0434   INR 1.0     Labs within the past 24 hours have been reviewed.    Diagnostic Results:  I have personally reviewed all pertinent imaging studies.

## 2018-05-19 NOTE — ASSESSMENT & PLAN NOTE
- suppository 5/17- pt refused.  - continue to decrease pain medication  - continue bisacodyl orally

## 2018-05-19 NOTE — PLAN OF CARE
Problem: Patient Care Overview  Goal: Plan of Care Review  Outcome: Ongoing (interventions implemented as appropriate)  AOx4, VSS, C/O pain 7/10 - PRN medication given; relief obtained. Steroid therapy maintained. Pt outstanding for BM. Pt to self administer suppository, but has refused. Encouraged to administer sooner than later. Mother @ bedside also encouraging pt. Remains free from falls. Bed remains locked and lowered. Personal items and call light w/in reach. NAD, VENKAT.

## 2018-05-20 LAB
ALBUMIN SERPL BCP-MCNC: 2.5 G/DL
ALP SERPL-CCNC: 119 U/L
ALT SERPL W/O P-5'-P-CCNC: <5 U/L
ANION GAP SERPL CALC-SCNC: 11 MMOL/L
AST SERPL-CCNC: 13 U/L
BASOPHILS # BLD AUTO: 0 K/UL
BASOPHILS NFR BLD: 0 %
BILIRUB SERPL-MCNC: 1.6 MG/DL
BUN SERPL-MCNC: 47 MG/DL
CALCIUM SERPL-MCNC: 8.6 MG/DL
CHLORIDE SERPL-SCNC: 100 MMOL/L
CO2 SERPL-SCNC: 24 MMOL/L
CREAT SERPL-MCNC: 3.6 MG/DL
CYCLOSPORINE BLD LC/MS/MS-MCNC: <10 NG/ML
DIFFERENTIAL METHOD: ABNORMAL
EOSINOPHIL # BLD AUTO: 0 K/UL
EOSINOPHIL NFR BLD: 0 %
ERYTHROCYTE [DISTWIDTH] IN BLOOD BY AUTOMATED COUNT: 17.2 %
EST. GFR  (AFRICAN AMERICAN): 25.1 ML/MIN/1.73 M^2
EST. GFR  (NON AFRICAN AMERICAN): 21.7 ML/MIN/1.73 M^2
GLUCOSE SERPL-MCNC: 130 MG/DL
HCT VFR BLD AUTO: 23.5 %
HGB BLD-MCNC: 7.6 G/DL
IMM GRANULOCYTES # BLD AUTO: 0.06 K/UL
IMM GRANULOCYTES NFR BLD AUTO: 2.2 %
INR PPP: 1.1
LYMPHOCYTES # BLD AUTO: 0.6 K/UL
LYMPHOCYTES NFR BLD: 19.7 %
MAGNESIUM SERPL-MCNC: 2 MG/DL
MCH RBC QN AUTO: 30.5 PG
MCHC RBC AUTO-ENTMCNC: 32.3 G/DL
MCV RBC AUTO: 94 FL
MONOCYTES # BLD AUTO: 0.4 K/UL
MONOCYTES NFR BLD: 12.9 %
NEUTROPHILS # BLD AUTO: 1.8 K/UL
NEUTROPHILS NFR BLD: 65.2 %
NRBC BLD-RTO: 1 /100 WBC
PHOSPHATE SERPL-MCNC: 4.5 MG/DL
PLATELET # BLD AUTO: 20 K/UL
PLATELET BLD QL SMEAR: ABNORMAL
PMV BLD AUTO: ABNORMAL FL
POTASSIUM SERPL-SCNC: 3.8 MMOL/L
PROT SERPL-MCNC: 6.3 G/DL
PROTHROMBIN TIME: 11.2 SEC
RBC # BLD AUTO: 2.49 M/UL
RETICS/RBC NFR AUTO: 5 %
SODIUM SERPL-SCNC: 135 MMOL/L
WBC # BLD AUTO: 2.79 K/UL

## 2018-05-20 PROCEDURE — 20600001 HC STEP DOWN PRIVATE ROOM: Mod: NTX

## 2018-05-20 PROCEDURE — 25000003 PHARM REV CODE 250: Mod: NTX | Performed by: PHYSICIAN ASSISTANT

## 2018-05-20 PROCEDURE — 83735 ASSAY OF MAGNESIUM: CPT | Mod: NTX

## 2018-05-20 PROCEDURE — 25000003 PHARM REV CODE 250: Mod: NTX | Performed by: NURSE PRACTITIONER

## 2018-05-20 PROCEDURE — 85610 PROTHROMBIN TIME: CPT | Mod: NTX

## 2018-05-20 PROCEDURE — 85045 AUTOMATED RETICULOCYTE COUNT: CPT | Mod: NTX

## 2018-05-20 PROCEDURE — 85025 COMPLETE CBC W/AUTO DIFF WBC: CPT | Mod: NTX

## 2018-05-20 PROCEDURE — 63600175 PHARM REV CODE 636 W HCPCS: Mod: NTX | Performed by: NURSE PRACTITIONER

## 2018-05-20 PROCEDURE — 25000003 PHARM REV CODE 250: Mod: NTX | Performed by: STUDENT IN AN ORGANIZED HEALTH CARE EDUCATION/TRAINING PROGRAM

## 2018-05-20 PROCEDURE — 84100 ASSAY OF PHOSPHORUS: CPT | Mod: NTX

## 2018-05-20 PROCEDURE — 80053 COMPREHEN METABOLIC PANEL: CPT | Mod: NTX

## 2018-05-20 PROCEDURE — 36415 COLL VENOUS BLD VENIPUNCTURE: CPT | Mod: NTX

## 2018-05-20 PROCEDURE — 80158 DRUG ASSAY CYCLOSPORINE: CPT | Mod: NTX

## 2018-05-20 PROCEDURE — 99233 SBSQ HOSP IP/OBS HIGH 50: CPT | Mod: NTX,,, | Performed by: NURSE PRACTITIONER

## 2018-05-20 PROCEDURE — 63600175 PHARM REV CODE 636 W HCPCS: Mod: NTX | Performed by: STUDENT IN AN ORGANIZED HEALTH CARE EDUCATION/TRAINING PROGRAM

## 2018-05-20 RX ORDER — SYRING-NEEDL,DISP,INSUL,0.3 ML 29 G X1/2"
296 SYRINGE, EMPTY DISPOSABLE MISCELLANEOUS ONCE
Status: COMPLETED | OUTPATIENT
Start: 2018-05-20 | End: 2018-05-20

## 2018-05-20 RX ORDER — PSEUDOEPHEDRINE/ACETAMINOPHEN 30MG-500MG
100 TABLET ORAL
Status: DISPENSED | OUTPATIENT
Start: 2018-05-20 | End: 2018-05-20

## 2018-05-20 RX ORDER — SYRING-NEEDL,DISP,INSUL,0.3 ML 29 G X1/2"
296 SYRINGE, EMPTY DISPOSABLE MISCELLANEOUS
Status: DISPENSED | OUTPATIENT
Start: 2018-05-20 | End: 2018-05-20

## 2018-05-20 RX ADMIN — AMLODIPINE BESYLATE 5 MG: 5 TABLET ORAL at 08:05

## 2018-05-20 RX ADMIN — METOCLOPRAMIDE HYDROCHLORIDE 10 MG: 5 TABLET ORAL at 05:05

## 2018-05-20 RX ADMIN — ALUMINUM HYDROXIDE, MAGNESIUM HYDROXIDE, AND SIMETHICONE 50 ML: 200; 200; 20 SUSPENSION ORAL at 10:05

## 2018-05-20 RX ADMIN — OXYCODONE HYDROCHLORIDE 10 MG: 5 TABLET ORAL at 04:05

## 2018-05-20 RX ADMIN — URSODIOL 300 MG: 300 CAPSULE ORAL at 08:05

## 2018-05-20 RX ADMIN — FAMOTIDINE 20 MG: 20 TABLET ORAL at 08:05

## 2018-05-20 RX ADMIN — METOCLOPRAMIDE HYDROCHLORIDE 10 MG: 5 TABLET ORAL at 08:05

## 2018-05-20 RX ADMIN — HYDROCORTISONE SODIUM SUCCINATE 100 MG: 100 INJECTION, POWDER, FOR SOLUTION INTRAMUSCULAR; INTRAVENOUS at 02:05

## 2018-05-20 RX ADMIN — MAGESIUM CITRATE 296 ML: 1.75 LIQUID ORAL at 10:05

## 2018-05-20 RX ADMIN — OXYCODONE HYDROCHLORIDE 10 MG: 5 TABLET ORAL at 08:05

## 2018-05-20 RX ADMIN — BISACODYL 10 MG: 10 SUPPOSITORY RECTAL at 10:05

## 2018-05-20 RX ADMIN — METOCLOPRAMIDE HYDROCHLORIDE 10 MG: 5 TABLET ORAL at 11:05

## 2018-05-20 RX ADMIN — DRONABINOL 2.5 MG: 2.5 CAPSULE ORAL at 08:05

## 2018-05-20 RX ADMIN — SEVELAMER CARBONATE 800 MG: 800 TABLET, FILM COATED ORAL at 05:05

## 2018-05-20 RX ADMIN — SEVELAMER CARBONATE 800 MG: 800 TABLET, FILM COATED ORAL at 08:05

## 2018-05-20 RX ADMIN — OXYCODONE HYDROCHLORIDE 10 MG: 5 TABLET ORAL at 05:05

## 2018-05-20 RX ADMIN — HYDROCORTISONE SODIUM SUCCINATE 100 MG: 100 INJECTION, POWDER, FOR SOLUTION INTRAMUSCULAR; INTRAVENOUS at 05:05

## 2018-05-20 RX ADMIN — LEVETIRACETAM 500 MG: 500 TABLET ORAL at 08:05

## 2018-05-20 RX ADMIN — HYDROCORTISONE SODIUM SUCCINATE 100 MG: 100 INJECTION, POWDER, FOR SOLUTION INTRAMUSCULAR; INTRAVENOUS at 08:05

## 2018-05-20 RX ADMIN — OXYCODONE HYDROCHLORIDE 10 MG: 5 TABLET ORAL at 11:05

## 2018-05-20 RX ADMIN — SEVELAMER CARBONATE 800 MG: 800 TABLET, FILM COATED ORAL at 11:05

## 2018-05-20 RX ADMIN — LEVOTHYROXINE SODIUM 75 MCG: 25 TABLET ORAL at 05:05

## 2018-05-20 RX ADMIN — OXYCODONE HYDROCHLORIDE 10 MG: 5 TABLET ORAL at 12:05

## 2018-05-20 RX ADMIN — BISACODYL 10 MG: 5 TABLET, COATED ORAL at 08:05

## 2018-05-20 NOTE — ASSESSMENT & PLAN NOTE
- suppository 5/17- pt refused.  - continue to decrease pain medication  - continue bisacodyl orally  - enema ordered

## 2018-05-20 NOTE — PROGRESS NOTES
[facility]  Liver Transplant  Progress Note    Patient Name: Jhonny Diana  MRN: [id number]  Admission Date: 2018  Hospital Length of Stay: 11 days  Code Status: Full Code  Primary Care Provider: Primary Doctor No  Post-Operative Day: 213    ORGAN:   LIVER  Disease Etiology: Acute Alcoholic Hepatitis  Donor Type:    - Brain Death  CDC High Risk:   No  Donor CMV Status:   Donor CMV Status: Positive  Donor HBcAB:   Negative  Donor HCV Status:   Negative  Whole or Partial: Whole Liver  Biliary Anastomosis: End to End  Arterial Anatomy: Standard  Subjective:     History of Present Illness:  Mr. 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  S/p OLTX 10/19/2017 (Liver) with steroid induction. Post op course with multiple complications to include biliary stricture, FUO, ESRD, and chronic abdominal pain. Recently discharged on  after an ERCP. Reports that since discharge he has had fevers of 100.4 to 100.8. These fevers resolve on their own without medication. Also reports decreased appetite and fatigue. Nausea and abdominal pain are baseline and unchanged from prior to discharge. Normal bowel movements and passing gas. Receiving dialysis Monday and Friday. On admission, patient was afebrile and hemodynamically stable. Labs were stable from prior except tbili which is up to 3.9 from 3.2 yesterday.     Hospital Course:  Interval History: no acute events overnight. Hematology consulted for worsening thrombocytopenia.  Plts improving slowly- 20 today.  Continue to hold cya.  If no improvement by Monday restart cya and treat thrombocytopenia with steroids (1mg/kg).  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 Biopsy pain improved with bupivicaine and apply lidocaine patch.  However, bradycardic this am will obtain EKG.  HIT negative. CT chest/abd/pelvis to evaluate  lymph nodes 5/16 with no lymphadenopathy. Hematology work up still in process. Prealbumin 10 on 5/14, previously 17 a month ago, continue to encourage oral intake.  Will recheck prealbumin Monday 5/21.    Scheduled Meds:   amLODIPine  5 mg Oral Daily    bisacodyl  10 mg Oral Daily    dronabinol  2.5 mg Oral QHS    epoetin maurisio (PROCRIT) injection  20,000 Units Intravenous Every Mon, Fri    ergocalciferol  50,000 Units Oral Q7 Days    famotidine  20 mg Oral QHS    glycerin 99.5%  100 mL Rectal ED 1 Time    And    magnesium citrate  296 mL Rectal ED 1 Time    And    sodium chloride 0.9%  500 mL Rectal ED 1 Time    hydrocortisone sodium succinate  100 mg Intravenous Q8H    levETIRAcetam  500 mg Oral BID    levothyroxine  75 mcg Oral Before breakfast    lidocaine  1 patch Transdermal Q24H    metoclopramide HCl  10 mg Oral QID (AC & HS)    sevelamer carbonate  800 mg Oral TID WM    ursodiol  300 mg Oral BID     Continuous Infusions:  PRN Meds:sodium chloride 0.9%, bisacodyl, gentamicin, ondansetron, oxyCODONE, prochlorperazine, ramelteon, sodium chloride 0.9%    Review of Systems   Constitutional: Positive for activity change and appetite change. Negative for chills, fatigue and fever.   Respiratory: Negative for cough and shortness of breath.    Cardiovascular: Negative for chest pain and leg swelling.   Gastrointestinal: Positive for abdominal distention, abdominal pain and constipation. Negative for nausea and vomiting.   Genitourinary: Positive for decreased urine volume and flank pain.   Musculoskeletal: Negative for arthralgias, back pain and myalgias.   Skin: Negative for color change and wound.   Allergic/Immunologic: Positive for immunocompromised state.   Neurological: Positive for weakness. Negative for dizziness, syncope and headaches.   Psychiatric/Behavioral: Positive for dysphoric mood. The patient is nervous/anxious.      Objective:     Vital Signs (Most Recent):  Temp: 98.3 °F (36.8 °C)  (05/20/18 1128)  Pulse: 67 (05/20/18 1128)  Resp: 18 (05/20/18 1128)  BP: 131/83 (05/20/18 1128)  SpO2: 99 % (05/20/18 1128) Vital Signs (24h Range):  Temp:  [97.7 °F (36.5 °C)-98.7 °F (37.1 °C)] 98.3 °F (36.8 °C)  Pulse:  [57-69] 67  Resp:  [15-18] 18  SpO2:  [96 %-100 %] 99 %  BP: (125-133)/(79-91) 131/83     Weight: 59 kg (130 lb 0.1 oz)  Body mass index is 20.98 kg/m².    Intake/Output - Last 3 Shifts       05/18 0700 - 05/19 0659 05/19 0700 - 05/20 0659 05/20 0700 - 05/21 0659    P.O. 1950 1200 360    I.V. (mL/kg) 0 (0) 0 (0)     Other 700 0     Total Intake(mL/kg) 2650 (44.5) 1200 (20.3) 360 (6.1)    Urine (mL/kg/hr) 375 (0.3) 705 (0.5)     Emesis/NG output 0 (0) 0 (0)     Other 2200 (1.5) 0 (0)     Stool 0 (0) 0 (0)     Blood 0 (0) 0 (0)     Total Output 2575 705      Net +75 +495 +360           Urine Occurrence 0 x 0 x 450 x    Stool Occurrence 0 x 0 x 1 x    Emesis Occurrence 0 x 0 x           Physical Exam   Constitutional: He is oriented to person, place, and time. He appears well-developed. He is cooperative.   Hand and temporal muscle wasting   HENT:   Head: Normocephalic and atraumatic.   Eyes: Pupils are equal, round, and reactive to light.   Neck: Normal range of motion.   Cardiovascular: Normal rate, regular rhythm, normal heart sounds, intact distal pulses and normal pulses.    Pulmonary/Chest: Effort normal. No respiratory distress. He has decreased breath sounds in the right lower field and the left lower field. He has no rales.   Abdominal: Soft. Bowel sounds are normal. He exhibits distension. There is generalized tenderness.   Musculoskeletal: Normal range of motion. He exhibits no edema.   Neurological: He is alert and oriented to person, place, and time.   Skin: Skin is warm and dry. There is pallor.   Psychiatric: He has a normal mood and affect. His behavior is normal. Judgment and thought content normal.   Nursing note and vitals reviewed.      Laboratory:  Immunosuppressants     None         CBC:     Recent Labs  Lab 05/20/18  0445   WBC 2.79*   RBC 2.49*   HGB 7.6*   HCT 23.5*   PLT 20*   MCV 94   MCH 30.5   MCHC 32.3     CMP:     Recent Labs  Lab 05/20/18  0445   *   CALCIUM 8.6*   ALBUMIN 2.5*   PROT 6.3   *   K 3.8   CO2 24      BUN 47*   CREATININE 3.6*   ALKPHOS 119   ALT <5*   AST 13   BILITOT 1.6*     Coagulation:     Recent Labs  Lab 05/20/18  0445   INR 1.1     Labs within the past 24 hours have been reviewed.    Diagnostic Results:  I have personally reviewed all pertinent imaging studies.    Assessment/Plan:     Seizure    - no seizure activity noted          S/P liver transplant    - LFTs stable  - liver u/s reviewed - unremarkable  - monitor        Prophylactic immunotherapy    - cyclosporine on HOLD- awaiting improvement in plt.  - monitor cya levels daily         Long-term use of immunosuppressant medication    - See prophylactic immunosuppression.        At risk for opportunistic infections    - monitor.          Thrombocytopenia    - see other pancytopenia.  PLt 15 today.        Other pancytopenia    - Hem/onc consulted for worsening thrombocytopenia despite platelet transfusion  - attempted bone marrow biopsy 5/14 without success due to pain, able to obtain small dry sample, heme to attempt to run test on biopsy  - HIT panel negative.   - CT chest/abd/pelvis 5/15 to evaluate lymph nodes - reviewed with no lymphadenopathy  - will follow Heme recommendations - stress dose steroids & d/c cya, if this is unsuccessful transition to treatment steroids monday  - bone marrow biopsy site pain improved with application of lidocaine patch and inject bupivacaine        Anemia of chronic renal failure    - h/h chronically low  - continue epoetin  - hemolysis labs concerning - hematology consulted & following  - H/H improved s/p 1 unit of PRBC 5/12.   - transfused 1 unit prbc 5/16 with good response  - monitor cbc daily        Hyperphosphatemia    - continue sevelamer  carbonate  - monitor labs daily        Vitamin D deficiency    - continue ergo replacement          Constipation    - suppository 5/17- pt refused.  - continue to decrease pain medication  - continue bisacodyl orally  - enema ordered          ESRD Monday/Friday    - nephrology following  - making some urine, however not clearing  - Monday/friday dialysis schedule        Severe protein-calorie malnutrition    - continue dronabinol for appetite stimulant  - po supplements  - appetite improving slowly  - dietary following  - prealbumin 10 on 5/14, previously 17 a month ago, recheck Monday 5/21        Biliary stricture of transplanted liver    - s/p ERCP 5/4  - stent in place per imaging  - PBS consulted for possible ERCP - not recommending ERCP for now  - bili now improving, monitor            VTE Risk Mitigation         Ordered     IP VTE HIGH RISK PATIENT  Once      05/09/18 0044          The patients clinical status was discussed at multidisplinary rounds, involving transplant surgery, transplant medicine, pharmacy, nursing, nutrition, and social work    Discharge Planning:  No plan for discharge.  Will need to discuss immunosupression once plt stabilize    Norma Blackwood NP  Liver Transplant  [facility]

## 2018-05-20 NOTE — PLAN OF CARE
Problem: Patient Care Overview  Goal: Plan of Care Review  Outcome: Ongoing (interventions implemented as appropriate)  Patient aaox4. Bed kept locked, lowest position with 2x side rails up while in bed .nonslip footwear when out of bed. Ambulates with standby assistance; ambulates frequently. Mother at bedside, attentive to patient's needs. Regular diet, eating encouraged. Patient reported no BM in over a week, enema ordered, patient refused enema but accepted suppository; patient reported small soft BM.  Left FA PIV cdi. Right chest perm cath cdi; HD MF, plan for HD tomorrow. Trace edema to bilateral ankles. Complaints of pain moderately controlled with prn medications.  Platelet count 20 today, was 15 yesterday.  Standard precautions maintained.

## 2018-05-20 NOTE — SUBJECTIVE & OBJECTIVE
Scheduled Meds:   amLODIPine  5 mg Oral Daily    bisacodyl  10 mg Oral Daily    dronabinol  2.5 mg Oral QHS    epoetin maurisio (PROCRIT) injection  20,000 Units Intravenous Every Mon, Fri    ergocalciferol  50,000 Units Oral Q7 Days    famotidine  20 mg Oral QHS    glycerin 99.5%  100 mL Rectal ED 1 Time    And    magnesium citrate  296 mL Rectal ED 1 Time    And    sodium chloride 0.9%  500 mL Rectal ED 1 Time    hydrocortisone sodium succinate  100 mg Intravenous Q8H    levETIRAcetam  500 mg Oral BID    levothyroxine  75 mcg Oral Before breakfast    lidocaine  1 patch Transdermal Q24H    metoclopramide HCl  10 mg Oral QID (AC & HS)    sevelamer carbonate  800 mg Oral TID WM    ursodiol  300 mg Oral BID     Continuous Infusions:  PRN Meds:sodium chloride 0.9%, bisacodyl, gentamicin, ondansetron, oxyCODONE, prochlorperazine, ramelteon, sodium chloride 0.9%    Review of Systems   Constitutional: Positive for activity change and appetite change. Negative for chills, fatigue and fever.   Respiratory: Negative for cough and shortness of breath.    Cardiovascular: Negative for chest pain and leg swelling.   Gastrointestinal: Positive for abdominal distention, abdominal pain and constipation. Negative for nausea and vomiting.   Genitourinary: Positive for decreased urine volume and flank pain.   Musculoskeletal: Negative for arthralgias, back pain and myalgias.   Skin: Negative for color change and wound.   Allergic/Immunologic: Positive for immunocompromised state.   Neurological: Positive for weakness. Negative for dizziness, syncope and headaches.   Psychiatric/Behavioral: Positive for dysphoric mood. The patient is nervous/anxious.      Objective:     Vital Signs (Most Recent):  Temp: 98.3 °F (36.8 °C) (05/20/18 1128)  Pulse: 67 (05/20/18 1128)  Resp: 18 (05/20/18 1128)  BP: 131/83 (05/20/18 1128)  SpO2: 99 % (05/20/18 1128) Vital Signs (24h Range):  Temp:  [97.7 °F (36.5 °C)-98.7 °F (37.1 °C)] 98.3 °F  (36.8 °C)  Pulse:  [57-69] 67  Resp:  [15-18] 18  SpO2:  [96 %-100 %] 99 %  BP: (125-133)/(79-91) 131/83     Weight: 59 kg (130 lb 0.1 oz)  Body mass index is 20.98 kg/m².    Intake/Output - Last 3 Shifts       05/18 0700 - 05/19 0659 05/19 0700 - 05/20 0659 05/20 0700 - 05/21 0659    P.O. 1950 1200 360    I.V. (mL/kg) 0 (0) 0 (0)     Other 700 0     Total Intake(mL/kg) 2650 (44.5) 1200 (20.3) 360 (6.1)    Urine (mL/kg/hr) 375 (0.3) 705 (0.5)     Emesis/NG output 0 (0) 0 (0)     Other 2200 (1.5) 0 (0)     Stool 0 (0) 0 (0)     Blood 0 (0) 0 (0)     Total Output 2575 705      Net +75 +495 +360           Urine Occurrence 0 x 0 x 450 x    Stool Occurrence 0 x 0 x 1 x    Emesis Occurrence 0 x 0 x           Physical Exam   Constitutional: He is oriented to person, place, and time. He appears well-developed. He is cooperative.   Hand and temporal muscle wasting   HENT:   Head: Normocephalic and atraumatic.   Eyes: Pupils are equal, round, and reactive to light.   Neck: Normal range of motion.   Cardiovascular: Normal rate, regular rhythm, normal heart sounds, intact distal pulses and normal pulses.    Pulmonary/Chest: Effort normal. No respiratory distress. He has decreased breath sounds in the right lower field and the left lower field. He has no rales.   Abdominal: Soft. Bowel sounds are normal. He exhibits distension. There is generalized tenderness.   Musculoskeletal: Normal range of motion. He exhibits no edema.   Neurological: He is alert and oriented to person, place, and time.   Skin: Skin is warm and dry. There is pallor.   Psychiatric: He has a normal mood and affect. His behavior is normal. Judgment and thought content normal.   Nursing note and vitals reviewed.      Laboratory:  Immunosuppressants     None        CBC:     Recent Labs  Lab 05/20/18  0445   WBC 2.79*   RBC 2.49*   HGB 7.6*   HCT 23.5*   PLT 20*   MCV 94   MCH 30.5   MCHC 32.3     CMP:     Recent Labs  Lab 05/20/18  0445   *   CALCIUM 8.6*    ALBUMIN 2.5*   PROT 6.3   *   K 3.8   CO2 24      BUN 47*   CREATININE 3.6*   ALKPHOS 119   ALT <5*   AST 13   BILITOT 1.6*     Coagulation:     Recent Labs  Lab 05/20/18  0445   INR 1.1     Labs within the past 24 hours have been reviewed.    Diagnostic Results:  I have personally reviewed all pertinent imaging studies.

## 2018-05-21 PROBLEM — T81.89XA DELAYED SURGICAL WOUND HEALING: Status: RESOLVED | Noted: 2017-11-17 | Resolved: 2018-05-21

## 2018-05-21 PROBLEM — R11.2 NAUSEA AND VOMITING: Status: RESOLVED | Noted: 2018-01-11 | Resolved: 2018-05-21

## 2018-05-21 PROBLEM — R18.8 ASCITES: Status: RESOLVED | Noted: 2018-01-11 | Resolved: 2018-05-21

## 2018-05-21 PROBLEM — E43 SEVERE MALNUTRITION: Status: ACTIVE | Noted: 2018-05-21

## 2018-05-21 LAB
ABO + RH BLD: NORMAL
ALBUMIN SERPL BCP-MCNC: 2.5 G/DL
ALP SERPL-CCNC: 128 U/L
ALT SERPL W/O P-5'-P-CCNC: 5 U/L
ANION GAP SERPL CALC-SCNC: 15 MMOL/L
ANISOCYTOSIS BLD QL SMEAR: SLIGHT
AST SERPL-CCNC: 13 U/L
BASOPHILS # BLD AUTO: 0 K/UL
BASOPHILS NFR BLD: 0 %
BILIRUB SERPL-MCNC: 1.5 MG/DL
BLD GP AB SCN CELLS X3 SERPL QL: NORMAL
BUN SERPL-MCNC: 58 MG/DL
CALCIUM SERPL-MCNC: 7.3 MG/DL
CHLORIDE SERPL-SCNC: 101 MMOL/L
CO2 SERPL-SCNC: 21 MMOL/L
CREAT SERPL-MCNC: 3.8 MG/DL
CYCLOSPORINE BLD LC/MS/MS-MCNC: <10 NG/ML
DIFFERENTIAL METHOD: ABNORMAL
EOSINOPHIL # BLD AUTO: 0 K/UL
EOSINOPHIL NFR BLD: 0 %
ERYTHROCYTE [DISTWIDTH] IN BLOOD BY AUTOMATED COUNT: 17.3 %
EST. GFR  (AFRICAN AMERICAN): 23.5 ML/MIN/1.73 M^2
EST. GFR  (NON AFRICAN AMERICAN): 20.3 ML/MIN/1.73 M^2
GLUCOSE SERPL-MCNC: 133 MG/DL
HCT VFR BLD AUTO: 24.1 %
HGB BLD-MCNC: 8 G/DL
IMM GRANULOCYTES # BLD AUTO: 0.05 K/UL
IMM GRANULOCYTES NFR BLD AUTO: 1.7 %
INR PPP: 1.1
LYMPHOCYTES # BLD AUTO: 0.6 K/UL
LYMPHOCYTES NFR BLD: 18.3 %
MAGNESIUM SERPL-MCNC: 2.4 MG/DL
MCH RBC QN AUTO: 31.4 PG
MCHC RBC AUTO-ENTMCNC: 33.2 G/DL
MCV RBC AUTO: 95 FL
MONOCYTES # BLD AUTO: 0.4 K/UL
MONOCYTES NFR BLD: 13 %
NEUTROPHILS # BLD AUTO: 2 K/UL
NEUTROPHILS NFR BLD: 67 %
NRBC BLD-RTO: 1 /100 WBC
PHOSPHATE SERPL-MCNC: 5 MG/DL
PLATELET # BLD AUTO: 25 K/UL
PLATELET BLD QL SMEAR: ABNORMAL
PMV BLD AUTO: ABNORMAL FL
POLYCHROMASIA BLD QL SMEAR: ABNORMAL
POTASSIUM SERPL-SCNC: 3.5 MMOL/L
PROT SERPL-MCNC: 6.3 G/DL
PROTHROMBIN TIME: 11.3 SEC
RBC # BLD AUTO: 2.55 M/UL
RETICS/RBC NFR AUTO: 5.4 %
SODIUM SERPL-SCNC: 137 MMOL/L
WBC # BLD AUTO: 3 K/UL

## 2018-05-21 PROCEDURE — 25000003 PHARM REV CODE 250: Mod: NTX | Performed by: INTERNAL MEDICINE

## 2018-05-21 PROCEDURE — 86850 RBC ANTIBODY SCREEN: CPT | Mod: NTX

## 2018-05-21 PROCEDURE — 94640 AIRWAY INHALATION TREATMENT: CPT | Mod: NTX

## 2018-05-21 PROCEDURE — 80158 DRUG ASSAY CYCLOSPORINE: CPT | Mod: NTX

## 2018-05-21 PROCEDURE — 90935 HEMODIALYSIS ONE EVALUATION: CPT | Mod: NTX

## 2018-05-21 PROCEDURE — 63600175 PHARM REV CODE 636 W HCPCS: Mod: NTX | Performed by: STUDENT IN AN ORGANIZED HEALTH CARE EDUCATION/TRAINING PROGRAM

## 2018-05-21 PROCEDURE — 25000003 PHARM REV CODE 250: Mod: NTX | Performed by: STUDENT IN AN ORGANIZED HEALTH CARE EDUCATION/TRAINING PROGRAM

## 2018-05-21 PROCEDURE — 83735 ASSAY OF MAGNESIUM: CPT | Mod: NTX

## 2018-05-21 PROCEDURE — 85025 COMPLETE CBC W/AUTO DIFF WBC: CPT | Mod: NTX

## 2018-05-21 PROCEDURE — 25000003 PHARM REV CODE 250: Mod: NTX | Performed by: NURSE PRACTITIONER

## 2018-05-21 PROCEDURE — 20600001 HC STEP DOWN PRIVATE ROOM: Mod: NTX

## 2018-05-21 PROCEDURE — 25000242 PHARM REV CODE 250 ALT 637 W/ HCPCS: Mod: NTX | Performed by: NURSE PRACTITIONER

## 2018-05-21 PROCEDURE — 63600175 PHARM REV CODE 636 W HCPCS: Mod: NTX | Performed by: NURSE PRACTITIONER

## 2018-05-21 PROCEDURE — 96372 THER/PROPH/DIAG INJ SC/IM: CPT | Mod: NTX

## 2018-05-21 PROCEDURE — 94761 N-INVAS EAR/PLS OXIMETRY MLT: CPT | Mod: NTX

## 2018-05-21 PROCEDURE — 85610 PROTHROMBIN TIME: CPT | Mod: NTX

## 2018-05-21 PROCEDURE — 84100 ASSAY OF PHOSPHORUS: CPT | Mod: NTX

## 2018-05-21 PROCEDURE — 99233 SBSQ HOSP IP/OBS HIGH 50: CPT | Mod: NTX,,, | Performed by: NURSE PRACTITIONER

## 2018-05-21 PROCEDURE — 90935 HEMODIALYSIS ONE EVALUATION: CPT | Mod: NTX,,, | Performed by: INTERNAL MEDICINE

## 2018-05-21 PROCEDURE — 85045 AUTOMATED RETICULOCYTE COUNT: CPT | Mod: NTX

## 2018-05-21 PROCEDURE — 25000003 PHARM REV CODE 250: Mod: NTX | Performed by: PHYSICIAN ASSISTANT

## 2018-05-21 PROCEDURE — 80053 COMPREHEN METABOLIC PANEL: CPT | Mod: NTX

## 2018-05-21 RX ORDER — PREDNISONE 10 MG/1
30 TABLET ORAL DAILY
Status: DISCONTINUED | OUTPATIENT
Start: 2018-06-11 | End: 2018-05-23 | Stop reason: HOSPADM

## 2018-05-21 RX ORDER — PENTAMIDINE ISETHIONATE 300 MG/300MG
300 INHALANT RESPIRATORY (INHALATION) ONCE
Status: COMPLETED | OUTPATIENT
Start: 2018-05-21 | End: 2018-05-21

## 2018-05-21 RX ORDER — PREDNISONE 10 MG/1
20 TABLET ORAL DAILY
Status: DISCONTINUED | OUTPATIENT
Start: 2018-06-18 | End: 2018-05-23 | Stop reason: HOSPADM

## 2018-05-21 RX ORDER — PREDNISONE 10 MG/1
40 TABLET ORAL DAILY
Status: DISCONTINUED | OUTPATIENT
Start: 2018-06-04 | End: 2018-05-23 | Stop reason: HOSPADM

## 2018-05-21 RX ORDER — PREDNISONE 10 MG/1
50 TABLET ORAL DAILY
Status: DISCONTINUED | OUTPATIENT
Start: 2018-05-28 | End: 2018-05-23 | Stop reason: HOSPADM

## 2018-05-21 RX ORDER — ALBUTEROL SULFATE 0.83 MG/ML
2.5 SOLUTION RESPIRATORY (INHALATION) ONCE
Status: COMPLETED | OUTPATIENT
Start: 2018-05-21 | End: 2018-05-21

## 2018-05-21 RX ORDER — PREDNISONE 10 MG/1
60 TABLET ORAL DAILY
Status: DISCONTINUED | OUTPATIENT
Start: 2018-05-21 | End: 2018-05-23 | Stop reason: HOSPADM

## 2018-05-21 RX ORDER — FLUCONAZOLE 200 MG/1
200 TABLET ORAL DAILY
Status: DISCONTINUED | OUTPATIENT
Start: 2018-05-21 | End: 2018-05-23 | Stop reason: HOSPADM

## 2018-05-21 RX ORDER — PREDNISONE 10 MG/1
10 TABLET ORAL DAILY
Status: DISCONTINUED | OUTPATIENT
Start: 2018-06-25 | End: 2018-05-23 | Stop reason: HOSPADM

## 2018-05-21 RX ADMIN — OXYCODONE HYDROCHLORIDE 10 MG: 5 TABLET ORAL at 05:05

## 2018-05-21 RX ADMIN — LEVETIRACETAM 500 MG: 500 TABLET ORAL at 09:05

## 2018-05-21 RX ADMIN — HYDROCORTISONE SODIUM SUCCINATE 100 MG: 100 INJECTION, POWDER, FOR SOLUTION INTRAMUSCULAR; INTRAVENOUS at 05:05

## 2018-05-21 RX ADMIN — URSODIOL 300 MG: 300 CAPSULE ORAL at 12:05

## 2018-05-21 RX ADMIN — ERYTHROPOIETIN 20000 UNITS: 20000 INJECTION, SOLUTION INTRAVENOUS; SUBCUTANEOUS at 10:05

## 2018-05-21 RX ADMIN — METOCLOPRAMIDE HYDROCHLORIDE 10 MG: 5 TABLET ORAL at 12:05

## 2018-05-21 RX ADMIN — METOCLOPRAMIDE HYDROCHLORIDE 10 MG: 5 TABLET ORAL at 09:05

## 2018-05-21 RX ADMIN — CYCLOSPORINE 50 MG: 25 CAPSULE, LIQUID FILLED ORAL at 12:05

## 2018-05-21 RX ADMIN — LEVETIRACETAM 500 MG: 500 TABLET ORAL at 12:05

## 2018-05-21 RX ADMIN — GENTAMICIN SULFATE 80 MG: 40 INJECTION, SOLUTION INTRAMUSCULAR; INTRAVENOUS at 10:05

## 2018-05-21 RX ADMIN — FAMOTIDINE 20 MG: 20 TABLET ORAL at 09:05

## 2018-05-21 RX ADMIN — OXYCODONE HYDROCHLORIDE 10 MG: 5 TABLET ORAL at 10:05

## 2018-05-21 RX ADMIN — ALBUTEROL SULFATE 2.5 MG: 2.5 SOLUTION RESPIRATORY (INHALATION) at 01:05

## 2018-05-21 RX ADMIN — SODIUM CHLORIDE 350 ML: 0.9 INJECTION, SOLUTION INTRAVENOUS at 08:05

## 2018-05-21 RX ADMIN — PENTAMIDINE ISETHIONATE 300 MG: 300 INHALANT RESPIRATORY (INHALATION) at 02:05

## 2018-05-21 RX ADMIN — OXYCODONE HYDROCHLORIDE 10 MG: 5 TABLET ORAL at 11:05

## 2018-05-21 RX ADMIN — URSODIOL 300 MG: 300 CAPSULE ORAL at 09:05

## 2018-05-21 RX ADMIN — DRONABINOL 2.5 MG: 2.5 CAPSULE ORAL at 09:05

## 2018-05-21 RX ADMIN — SEVELAMER CARBONATE 800 MG: 800 TABLET, FILM COATED ORAL at 05:05

## 2018-05-21 RX ADMIN — METOCLOPRAMIDE HYDROCHLORIDE 10 MG: 5 TABLET ORAL at 05:05

## 2018-05-21 RX ADMIN — BISACODYL 10 MG: 5 TABLET, COATED ORAL at 12:05

## 2018-05-21 RX ADMIN — CYCLOSPORINE 50 MG: 25 CAPSULE, LIQUID FILLED ORAL at 05:05

## 2018-05-21 RX ADMIN — AMLODIPINE BESYLATE 5 MG: 5 TABLET ORAL at 12:05

## 2018-05-21 RX ADMIN — PREDNISONE 60 MG: 10 TABLET ORAL at 12:05

## 2018-05-21 RX ADMIN — FLUCONAZOLE 200 MG: 200 TABLET ORAL at 12:05

## 2018-05-21 RX ADMIN — LEVOTHYROXINE SODIUM 75 MCG: 25 TABLET ORAL at 05:05

## 2018-05-21 RX ADMIN — SEVELAMER CARBONATE 800 MG: 800 TABLET, FILM COATED ORAL at 12:05

## 2018-05-21 NOTE — SUBJECTIVE & OBJECTIVE
Scheduled Meds:   albuterol sulfate  2.5 mg Nebulization Once    amLODIPine  5 mg Oral Daily    bisacodyl  10 mg Oral Daily    cycloSPORINE modified  50 mg Oral BID    dronabinol  2.5 mg Oral QHS    epoetin maurisio (PROCRIT) injection  20,000 Units Intravenous Every Mon, Fri    ergocalciferol  50,000 Units Oral Q7 Days    famotidine  20 mg Oral QHS    fluconazole  200 mg Oral Daily    levETIRAcetam  500 mg Oral BID    levothyroxine  75 mcg Oral Before breakfast    lidocaine  1 patch Transdermal Q24H    metoclopramide HCl  10 mg Oral QID (AC & HS)    pentamidine  300 mg Inhalation Once    predniSONE  60 mg Oral Daily    Followed by    [START ON 5/28/2018] predniSONE  50 mg Oral Daily    Followed by    [START ON 6/4/2018] predniSONE  40 mg Oral Daily    Followed by    [START ON 6/11/2018] predniSONE  30 mg Oral Daily    Followed by    [START ON 6/18/2018] predniSONE  20 mg Oral Daily    Followed by    [START ON 6/25/2018] predniSONE  10 mg Oral Daily    sevelamer carbonate  800 mg Oral TID WM    ursodiol  300 mg Oral BID     Continuous Infusions:  PRN Meds:sodium chloride 0.9%, bisacodyl, gentamicin, ondansetron, oxyCODONE, prochlorperazine, ramelteon, sodium chloride 0.9%    Review of Systems   Constitutional: Positive for activity change and appetite change. Negative for chills, fatigue and fever.   Respiratory: Negative for cough and shortness of breath.    Cardiovascular: Negative for chest pain and leg swelling.   Gastrointestinal: Positive for abdominal distention and abdominal pain. Negative for constipation, nausea and vomiting.   Genitourinary: Positive for decreased urine volume and flank pain.   Musculoskeletal: Negative for arthralgias, back pain and myalgias.   Skin: Negative for color change and wound.   Allergic/Immunologic: Positive for immunocompromised state.   Neurological: Positive for weakness. Negative for dizziness, syncope and headaches.   Psychiatric/Behavioral: Positive for  dysphoric mood. The patient is nervous/anxious.      Objective:     Vital Signs (Most Recent):  Temp: 98.1 °F (36.7 °C) (05/21/18 1223)  Pulse: 60 (05/21/18 1223)  Resp: 16 (05/21/18 1223)  BP: 129/82 (05/21/18 1223)  SpO2: 98 % (05/21/18 1223) Vital Signs (24h Range):  Temp:  [97.9 °F (36.6 °C)-98.6 °F (37 °C)] 98.1 °F (36.7 °C)  Pulse:  [49-69] 60  Resp:  [14-18] 16  SpO2:  [97 %-99 %] 98 %  BP: (123-137)/(78-91) 129/82     Weight: 59 kg (130 lb 0.1 oz)  Body mass index is 20.98 kg/m².    Intake/Output - Last 3 Shifts       05/19 0700 - 05/20 0659 05/20 0700 - 05/21 0659 05/21 0700 - 05/22 0659    P.O. 1200 1080 120    I.V. (mL/kg) 0 (0) 0 (0)     Other 0 0     Total Intake(mL/kg) 1200 (20.3) 1080 (18.3) 120 (2)    Urine (mL/kg/hr) 705 (0.5) 0 (0) 20 (0.1)    Emesis/NG output 0 (0) 0 (0)     Other 0 (0) 0 (0)     Stool 0 (0) 0 (0)     Blood 0 (0) 0 (0)     Total Output 705 0 20    Net +495 +1080 +100           Urine Occurrence 0 x 451 x     Stool Occurrence 0 x 1 x     Emesis Occurrence 0 x 0 x           Physical Exam   Constitutional: He is oriented to person, place, and time. He appears well-developed. He is cooperative.   Hand and temporal muscle wasting   HENT:   Head: Normocephalic and atraumatic.   Eyes: Pupils are equal, round, and reactive to light. Scleral icterus is present.   Neck: Normal range of motion.   Cardiovascular: Normal rate, regular rhythm, normal heart sounds, intact distal pulses and normal pulses.    Pulmonary/Chest: Effort normal. No respiratory distress. He has decreased breath sounds in the right lower field and the left lower field. He has no rales.   Abdominal: Soft. Bowel sounds are normal. He exhibits distension. There is generalized tenderness.   Musculoskeletal: Normal range of motion. He exhibits no edema.   Neurological: He is alert and oriented to person, place, and time.   Skin: Skin is warm and dry. There is pallor.   Psychiatric: He has a normal mood and affect. His behavior  is normal. Judgment and thought content normal.   Nursing note and vitals reviewed.      Laboratory:  Immunosuppressants         Stop Route Frequency     cycloSPORINE modified (NEORAL) capsule 50 mg      -- Oral 2 times daily        CBC:     Recent Labs  Lab 05/21/18 0419   WBC 3.00*   RBC 2.55*   HGB 8.0*   HCT 24.1*   PLT 25*   MCV 95   MCH 31.4*   MCHC 33.2     CMP:     Recent Labs  Lab 05/21/18 0419   *   CALCIUM 7.3*   ALBUMIN 2.5*   PROT 6.3      K 3.5   CO2 21*      BUN 58*   CREATININE 3.8*   ALKPHOS 128   ALT 5*   AST 13   BILITOT 1.5*     Coagulation:     Recent Labs  Lab 05/21/18 0419   INR 1.1     Labs within the past 24 hours have been reviewed.    Diagnostic Results:  I have personally reviewed all pertinent imaging studies.

## 2018-05-21 NOTE — NURSING
Returned to room via w/c after dislysis completed with 2 liters removed. See doc flowsheet for VS/ assessment. Updated on new orders.

## 2018-05-21 NOTE — PLAN OF CARE
Problem: Patient Care Overview  Goal: Plan of Care Review  Outcome: Ongoing (interventions implemented as appropriate)  AOx4, VSS, C/O increased abdominal/back pain 8/10 - PRN medication given; pt reported moderate relief obtained. Mg Citrate ordered along w/ GI cocktail. Steroid therapy maintained. Pt reported small BM on day shift. Abdomen remains distended and firm. Remains free from falls. Bed remains locked and lowered. Personal items and call light w/in reach. NAD, WCTM.

## 2018-05-21 NOTE — ASSESSMENT & PLAN NOTE
Malnutrition in the context of Chronic Illness/Injury    Related to (etiology):  Poor appetite, abdominal pain - improving at this admit    Signs and Symptoms (as evidenced by):  Energy Intake: <50% of estimated energy requirement for 5 months  Body Fat Depletion: severe depletion of orbitals and triceps   Muscle Mass Depletion: severe depletion of temples, clavicle region, scapular region and lower extremities   Weight Loss: 23% x 6 months      Interventions/Recommendations (treatment strategy):  See recs    Nutrition Diagnosis Status:  Continues

## 2018-05-21 NOTE — PLAN OF CARE
Problem: Patient Care Overview  Goal: Plan of Care Review  Outcome: Ongoing (interventions implemented as appropriate)  3.5 HR HD completed, net fluid rlyimrb=5685 mls, target goal achieved. Pt tolerated well. Dialysis catheter ports locked with Gentamycin 40 mgs into each port to fill lumen volume, ends capped and secured.  Report given to SISSY Haynes

## 2018-05-21 NOTE — ASSESSMENT & PLAN NOTE
- Hem/onc consulted for worsening thrombocytopenia despite platelet transfusion  - attempted bone marrow biopsy 5/14 without success due to pain, able to obtain small dry sample, heme to attempt to run test on biopsy  - HIT panel negative.   - CT chest/abd/pelvis 5/15 to evaluate lymph nodes - reviewed with no lymphadenopathy  - will follow Heme recommendations, treat with steroids  - restart cyclosporine  - bone marrow biopsy site pain improved with application of lidocaine patch and inject bupivacaine

## 2018-05-21 NOTE — PROGRESS NOTES
Pt arrived via stretcher per pt's escort,  Maintainance HD initiated via RT IJ Tunneled catheter, sluggish flows to the the proximal port noted, lines reversed.

## 2018-05-21 NOTE — PROGRESS NOTES
[facility]  Liver Transplant  Progress Note    Patient Name: Jhonny Diana  MRN: [id number]  Admission Date: 2018  Hospital Length of Stay: 12 days  Code Status: Full Code  Primary Care Provider: Primary Doctor No  Post-Operative Day: 214    ORGAN:   LIVER  Disease Etiology: Acute Alcoholic Hepatitis  Donor Type:    - Brain Death  CDC High Risk:   No  Donor CMV Status:   Donor CMV Status: Positive  Donor HBcAB:   Negative  Donor HCV Status:   Negative  Whole or Partial: Whole Liver  Biliary Anastomosis: End to End  Arterial Anatomy: Standard  Subjective:     History of Present Illness:  Mr. 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  S/p OLTX 10/19/2017 (Liver) with steroid induction. Post op course with multiple complications to include biliary stricture, FUO, ESRD, and chronic abdominal pain. Recently discharged on  after an ERCP. Reports that since discharge he has had fevers of 100.4 to 100.8. These fevers resolve on their own without medication. Also reports decreased appetite and fatigue. Nausea and abdominal pain are baseline and unchanged from prior to discharge. Normal bowel movements and passing gas. Receiving dialysis Monday and Friday. On admission, patient was afebrile and hemodynamically stable. Labs were stable from prior except tbili which is up to 3.9 from 3.2 yesterday.     Hospital Course:  Interval History: no acute events overnight. Hematology consulted for worsening thrombocytopenia.  Plts improving slowly- 25 today.  Will restart cya today as improvement of platelets attributed to steroids.  Will continue steroids, but transition to oral then taper down to 10 mg.  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  Prealbumin 10 on , previously 17 a month ago, continue to encourage oral intake.  Will recheck prealbumin Tuesday  5/22.    Scheduled Meds:   albuterol sulfate  2.5 mg Nebulization Once    amLODIPine  5 mg Oral Daily    bisacodyl  10 mg Oral Daily    cycloSPORINE modified  50 mg Oral BID    dronabinol  2.5 mg Oral QHS    epoetin maurisio (PROCRIT) injection  20,000 Units Intravenous Every Mon, Fri    ergocalciferol  50,000 Units Oral Q7 Days    famotidine  20 mg Oral QHS    fluconazole  200 mg Oral Daily    levETIRAcetam  500 mg Oral BID    levothyroxine  75 mcg Oral Before breakfast    lidocaine  1 patch Transdermal Q24H    metoclopramide HCl  10 mg Oral QID (AC & HS)    pentamidine  300 mg Inhalation Once    predniSONE  60 mg Oral Daily    Followed by    [START ON 5/28/2018] predniSONE  50 mg Oral Daily    Followed by    [START ON 6/4/2018] predniSONE  40 mg Oral Daily    Followed by    [START ON 6/11/2018] predniSONE  30 mg Oral Daily    Followed by    [START ON 6/18/2018] predniSONE  20 mg Oral Daily    Followed by    [START ON 6/25/2018] predniSONE  10 mg Oral Daily    sevelamer carbonate  800 mg Oral TID WM    ursodiol  300 mg Oral BID     Continuous Infusions:  PRN Meds:sodium chloride 0.9%, bisacodyl, gentamicin, ondansetron, oxyCODONE, prochlorperazine, ramelteon, sodium chloride 0.9%    Review of Systems   Constitutional: Positive for activity change and appetite change. Negative for chills, fatigue and fever.   Respiratory: Negative for cough and shortness of breath.    Cardiovascular: Negative for chest pain and leg swelling.   Gastrointestinal: Positive for abdominal distention and abdominal pain. Negative for constipation, nausea and vomiting.   Genitourinary: Positive for decreased urine volume and flank pain.   Musculoskeletal: Negative for arthralgias, back pain and myalgias.   Skin: Negative for color change and wound.   Allergic/Immunologic: Positive for immunocompromised state.   Neurological: Positive for weakness. Negative for dizziness, syncope and headaches.   Psychiatric/Behavioral:  Positive for dysphoric mood. The patient is nervous/anxious.      Objective:     Vital Signs (Most Recent):  Temp: 98.1 °F (36.7 °C) (05/21/18 1223)  Pulse: 60 (05/21/18 1223)  Resp: 16 (05/21/18 1223)  BP: 129/82 (05/21/18 1223)  SpO2: 98 % (05/21/18 1223) Vital Signs (24h Range):  Temp:  [97.9 °F (36.6 °C)-98.6 °F (37 °C)] 98.1 °F (36.7 °C)  Pulse:  [49-69] 60  Resp:  [14-18] 16  SpO2:  [97 %-99 %] 98 %  BP: (123-137)/(78-91) 129/82     Weight: 59 kg (130 lb 0.1 oz)  Body mass index is 20.98 kg/m².    Intake/Output - Last 3 Shifts       05/19 0700 - 05/20 0659 05/20 0700 - 05/21 0659 05/21 0700 - 05/22 0659    P.O. 1200 1080 120    I.V. (mL/kg) 0 (0) 0 (0)     Other 0 0     Total Intake(mL/kg) 1200 (20.3) 1080 (18.3) 120 (2)    Urine (mL/kg/hr) 705 (0.5) 0 (0) 20 (0.1)    Emesis/NG output 0 (0) 0 (0)     Other 0 (0) 0 (0)     Stool 0 (0) 0 (0)     Blood 0 (0) 0 (0)     Total Output 705 0 20    Net +495 +1080 +100           Urine Occurrence 0 x 451 x     Stool Occurrence 0 x 1 x     Emesis Occurrence 0 x 0 x           Physical Exam   Constitutional: He is oriented to person, place, and time. He appears well-developed. He is cooperative.   Hand and temporal muscle wasting   HENT:   Head: Normocephalic and atraumatic.   Eyes: Pupils are equal, round, and reactive to light. Scleral icterus is present.   Neck: Normal range of motion.   Cardiovascular: Normal rate, regular rhythm, normal heart sounds, intact distal pulses and normal pulses.    Pulmonary/Chest: Effort normal. No respiratory distress. He has decreased breath sounds in the right lower field and the left lower field. He has no rales.   Abdominal: Soft. Bowel sounds are normal. He exhibits distension. There is generalized tenderness.   Musculoskeletal: Normal range of motion. He exhibits no edema.   Neurological: He is alert and oriented to person, place, and time.   Skin: Skin is warm and dry. There is pallor.   Psychiatric: He has a normal mood and affect.  His behavior is normal. Judgment and thought content normal.   Nursing note and vitals reviewed.      Laboratory:  Immunosuppressants         Stop Route Frequency     cycloSPORINE modified (NEORAL) capsule 50 mg      -- Oral 2 times daily        CBC:     Recent Labs  Lab 05/21/18 0419   WBC 3.00*   RBC 2.55*   HGB 8.0*   HCT 24.1*   PLT 25*   MCV 95   MCH 31.4*   MCHC 33.2     CMP:     Recent Labs  Lab 05/21/18 0419   *   CALCIUM 7.3*   ALBUMIN 2.5*   PROT 6.3      K 3.5   CO2 21*      BUN 58*   CREATININE 3.8*   ALKPHOS 128   ALT 5*   AST 13   BILITOT 1.5*     Coagulation:     Recent Labs  Lab 05/21/18 0419   INR 1.1     Labs within the past 24 hours have been reviewed.    Diagnostic Results:  I have personally reviewed all pertinent imaging studies.    Assessment/Plan:     Other pancytopenia    - Hem/onc consulted for worsening thrombocytopenia despite platelet transfusion  - attempted bone marrow biopsy 5/14 without success due to pain, able to obtain small dry sample, heme to attempt to run test on biopsy  - HIT panel negative.   - CT chest/abd/pelvis 5/15 to evaluate lymph nodes - reviewed with no lymphadenopathy  - will follow Heme recommendations, treat with steroids  - restart cyclosporine  - bone marrow biopsy site pain improved with application of lidocaine patch and inject bupivacaine        Severe protein-calorie malnutrition    - continue dronabinol for appetite stimulant  - po supplements  - appetite improving slowly  - dietary following  - prealbumin 10 on 5/14, previously 17 a month ago, recheck Tuesday 5/22        Anemia of chronic renal failure    - h/h chronically low  - continue epoetin  - hemolysis labs concerning - hematology consulted & following  - H/H improved s/p 1 unit of PRBC 5/12.   - transfused 1 unit prbc 5/16 with good response  - monitor cbc daily        Biliary stricture of transplanted liver    - s/p ERCP 5/4  - stent in place per imaging  - PBS consulted for  possible ERCP - not recommending ERCP for now  - bili now improving, monitor        S/P liver transplant    - LFTs stable  - liver u/s reviewed - unremarkable  - monitor        Constipation    - suppository 5/17- pt refused.  - continue to decrease pain medication  - continue bisacodyl orally  - enema ordered, ,but deferred  - had success with mag citrate          Long-term use of immunosuppressant medication    - See prophylactic immunosuppression.        Prophylactic immunotherapy    - cyclosporine on HOLD- awaiting improvement in plt.  - monitor cya levels daily         Seizure    - no seizure activity noted          At risk for opportunistic infections    - as now treatment dose steroids  - pentamidine for pcp prophylaxis  - valcyte on hold as neutropenic, continue surveillance CMV PCR  - fluconazole for antigungal        Thrombocytopenia    - see other pancytopenia.  PLt 15 today.        Hyperphosphatemia    - continue sevelamer carbonate  - monitor labs daily        Vitamin D deficiency    - continue ergo replacement          ESRD Monday/Friday    - nephrology following  - making some urine, however not clearing  - Monday/friday dialysis schedule            VTE Risk Mitigation         Ordered     IP VTE HIGH RISK PATIENT  Once      05/09/18 0044          The patients clinical status was discussed at multidisplinary rounds, involving transplant surgery, transplant medicine, pharmacy, nursing, nutrition, and social work    Discharge Planning:  Monitor kidney function --> may need outpt HD  Monitor HH needs vs rehab for deconditioned status      Seda Camacho, NP  Liver Transplant  [facility]

## 2018-05-21 NOTE — ASSESSMENT & PLAN NOTE
- continue dronabinol for appetite stimulant  - po supplements  - appetite improving slowly  - dietary following  - prealbumin 10 on 5/14, previously 17 a month ago, recheck Tuesday 5/22

## 2018-05-21 NOTE — ASSESSMENT & PLAN NOTE
- suppository 5/17- pt refused.  - continue to decrease pain medication  - continue bisacodyl orally  - enema ordered, ,but deferred  - had success with mag citrate

## 2018-05-21 NOTE — ASSESSMENT & PLAN NOTE
- as now treatment dose steroids  - pentamidine for pcp prophylaxis  - valcyte on hold as neutropenic, continue surveillance CMV PCR  - fluconazole for antigungal

## 2018-05-21 NOTE — PROGRESS NOTES
" [facility]  Adult Nutrition  Progress Note    SUMMARY       Recommendations    Recommendation/Intervention:   -Continue regular diet, encouraging intake of meals and snacks.   -Continue appetite stimulant. RD following.     Goals: Consume/tolerate > 75% EEN/EPN  Nutrition Goal Status: new  Communication of RD Recs: reviewed with RN    Reason for Assessment    Reason for Assessment: length of stay  Diagnosis: transplant/postoperative complications (OLTx 10/2017)  Relevant Medical History: OLTx 10/19/2017; chronic malnutrition  Interdisciplinary Rounds: did not attend  General Information Comments: pt with anemia, eating fairly on this admit, still requiring HD 2x/week  Nutrition Discharge Planning: Adequate PO nutrition    Nutrition Risk Screen    Nutrition Risk Screen: other (see comments) (poor appetite for several days)    Nutrition/Diet History    Do you have any cultural, spiritual, Mu-ism conflicts, given your current situation?: No.  Factors Affecting Nutritional Intake: None identified at this time    Anthropometrics    Temp: 98.1 °F (36.7 °C)  Height Method: Stated  Height: 5' 5.98" (167.6 cm)  Height (inches): 65.98 in  Weight Method: Bed Scale  Weight: 59 kg (130 lb 0.1 oz)  Weight (lb): 130.01 lb  Ideal Body Weight (IBW), Male: 141.88 lb  % Ideal Body Weight, Male (lb): 90.85 lb  BMI (Calculated): 20.9  BMI Grade: 18.5-24.9 - normal       Lab/Procedures/Meds    Pertinent Labs Reviewed: reviewed  Pertinent Labs Comments: BUN 58, Cr 3.8, GFR 20, glu 133, P 5  Pertinent Medications Reviewed: reviewed  Pertinent Medications Comments: bisacodyl, ergocalciferol, marinol, sevelamer, metoclopramide, prednisone    Physical Findings/Assessment    Overall Physical Appearance: loss of muscle mass, loss of subcutaneous fat, generalized wasting, weak  Oral/Mouth Cavity: WDL  Skin: intact    Estimated/Assessed Needs    Weight Used For Calorie Calculations: 59 kg (130 lb 1.1 oz)  Energy Calorie " Requirements (kcal): 0154-8285 (30-35 kcal/kg)     Protein Requirements: 71-8.g (1.2-1.4 g/kg)  Weight Used For Protein Calculations: 59 kg (130 lb 1.1 oz)     Fluid Need Method: RDA Method  RDA Method (mL): 1770    Nutrition Prescription Ordered    Current Diet Order: Regular    Evaluation of Received Nutrient/Fluid Intake    I/O: +1.3L since admit  Comments: LBM 5/20  % Intake of Estimated Energy Needs: 50 - 75 %  % Meal Intake: 50 - 75 %    Nutrition Risk    Level of Risk/Frequency of Follow-up: moderate     Assessment and Plan    Severe malnutrition    Malnutrition in the context of Chronic Illness/Injury    Related to (etiology):  Poor appetite, abdominal pain - improving at this admit    Signs and Symptoms (as evidenced by):  Energy Intake: <50% of estimated energy requirement for 5 months  Body Fat Depletion: severe depletion of orbitals and triceps   Muscle Mass Depletion: severe depletion of temples, clavicle region, scapular region and lower extremities   Weight Loss: 23% x 6 months      Interventions/Recommendations (treatment strategy):  See recs    Nutrition Diagnosis Status:  Continues                 Monitor and Evaluation    Food and Nutrient Intake: energy intake, food and beverage intake  Food and Nutrient Adminstration: diet order  Knowledge/Beliefs/Attitudes: food and nutrition knowledge/skill  Physical Activity and Function: nutrition-related ADLs and IADLs  Anthropometric Measurements: weight, weight change, body mass index  Biochemical Data, Medical Tests and Procedures: electrolyte and renal panel, gastrointestinal profile, glucose/endocrine profile, inflammatory profile, lipid profile  Nutrition-Focused Physical Findings: overall appearance     Nutrition Follow-Up    RD Follow-up?: Yes

## 2018-05-22 PROBLEM — E43 SEVERE MALNUTRITION: Status: ACTIVE | Noted: 2018-05-22

## 2018-05-22 LAB
ALBUMIN SERPL BCP-MCNC: 2.5 G/DL
ALP SERPL-CCNC: 133 U/L
ALT SERPL W/O P-5'-P-CCNC: 6 U/L
ANION GAP SERPL CALC-SCNC: 10 MMOL/L
ANISOCYTOSIS BLD QL SMEAR: SLIGHT
AST SERPL-CCNC: 14 U/L
BASOPHILS # BLD AUTO: 0 K/UL
BASOPHILS NFR BLD: 0 %
BILIRUB SERPL-MCNC: 1.5 MG/DL
BUN SERPL-MCNC: 31 MG/DL
CALCIUM SERPL-MCNC: 8.7 MG/DL
CHLORIDE SERPL-SCNC: 98 MMOL/L
CO2 SERPL-SCNC: 23 MMOL/L
CREAT SERPL-MCNC: 2.4 MG/DL
CYCLOSPORINE BLD LC/MS/MS-MCNC: 80 NG/ML
CYCLOSPORINE BLD LC/MS/MS-MCNC: 80 NG/ML
DACRYOCYTES BLD QL SMEAR: ABNORMAL
DIFFERENTIAL METHOD: ABNORMAL
EOSINOPHIL # BLD AUTO: 0 K/UL
EOSINOPHIL NFR BLD: 0 %
ERYTHROCYTE [DISTWIDTH] IN BLOOD BY AUTOMATED COUNT: 19 %
EST. GFR  (AFRICAN AMERICAN): 40.9 ML/MIN/1.73 M^2
EST. GFR  (NON AFRICAN AMERICAN): 35.4 ML/MIN/1.73 M^2
GLUCOSE SERPL-MCNC: 153 MG/DL
HCT VFR BLD AUTO: 27.7 %
HGB BLD-MCNC: 9.1 G/DL
HYPOCHROMIA BLD QL SMEAR: ABNORMAL
IMM GRANULOCYTES # BLD AUTO: 0.04 K/UL
IMM GRANULOCYTES NFR BLD AUTO: 1.2 %
INR PPP: 1.1
LYMPHOCYTES # BLD AUTO: 0.6 K/UL
LYMPHOCYTES NFR BLD: 17.5 %
MAGNESIUM SERPL-MCNC: 2 MG/DL
MCH RBC QN AUTO: 31.6 PG
MCHC RBC AUTO-ENTMCNC: 32.9 G/DL
MCV RBC AUTO: 96 FL
MONOCYTES # BLD AUTO: 0.3 K/UL
MONOCYTES NFR BLD: 8.9 %
NEUTROPHILS # BLD AUTO: 2.4 K/UL
NEUTROPHILS NFR BLD: 72.4 %
NRBC BLD-RTO: 0 /100 WBC
OVALOCYTES BLD QL SMEAR: ABNORMAL
PHOSPHATE SERPL-MCNC: 2.9 MG/DL
PLATELET # BLD AUTO: 25 K/UL
PMV BLD AUTO: ABNORMAL FL
POIKILOCYTOSIS BLD QL SMEAR: SLIGHT
POLYCHROMASIA BLD QL SMEAR: ABNORMAL
POTASSIUM SERPL-SCNC: 3.8 MMOL/L
PREALB SERPL-MCNC: 20 MG/DL
PROT SERPL-MCNC: 6.4 G/DL
PROTHROMBIN TIME: 11 SEC
RBC # BLD AUTO: 2.88 M/UL
RETICS/RBC NFR AUTO: 8 %
SODIUM SERPL-SCNC: 131 MMOL/L
WBC # BLD AUTO: 3.37 K/UL

## 2018-05-22 PROCEDURE — 85025 COMPLETE CBC W/AUTO DIFF WBC: CPT | Mod: NTX

## 2018-05-22 PROCEDURE — 85610 PROTHROMBIN TIME: CPT | Mod: NTX

## 2018-05-22 PROCEDURE — 20600001 HC STEP DOWN PRIVATE ROOM: Mod: NTX

## 2018-05-22 PROCEDURE — 80053 COMPREHEN METABOLIC PANEL: CPT | Mod: NTX

## 2018-05-22 PROCEDURE — 63600175 PHARM REV CODE 636 W HCPCS: Mod: NTX | Performed by: NURSE PRACTITIONER

## 2018-05-22 PROCEDURE — 25000003 PHARM REV CODE 250: Mod: NTX | Performed by: PHYSICIAN ASSISTANT

## 2018-05-22 PROCEDURE — 99233 SBSQ HOSP IP/OBS HIGH 50: CPT | Mod: NTX,,, | Performed by: NURSE PRACTITIONER

## 2018-05-22 PROCEDURE — 80158 DRUG ASSAY CYCLOSPORINE: CPT | Mod: NTX

## 2018-05-22 PROCEDURE — 85045 AUTOMATED RETICULOCYTE COUNT: CPT | Mod: NTX

## 2018-05-22 PROCEDURE — 25000003 PHARM REV CODE 250: Mod: NTX | Performed by: NURSE PRACTITIONER

## 2018-05-22 PROCEDURE — 84134 ASSAY OF PREALBUMIN: CPT | Mod: NTX

## 2018-05-22 PROCEDURE — 25000003 PHARM REV CODE 250: Mod: NTX | Performed by: STUDENT IN AN ORGANIZED HEALTH CARE EDUCATION/TRAINING PROGRAM

## 2018-05-22 PROCEDURE — 84100 ASSAY OF PHOSPHORUS: CPT | Mod: NTX

## 2018-05-22 PROCEDURE — 83735 ASSAY OF MAGNESIUM: CPT | Mod: NTX

## 2018-05-22 PROCEDURE — 63600175 PHARM REV CODE 636 W HCPCS: Mod: NTX | Performed by: STUDENT IN AN ORGANIZED HEALTH CARE EDUCATION/TRAINING PROGRAM

## 2018-05-22 RX ORDER — GABAPENTIN 100 MG/1
100 CAPSULE ORAL NIGHTLY
Status: DISCONTINUED | OUTPATIENT
Start: 2018-05-22 | End: 2018-05-23 | Stop reason: HOSPADM

## 2018-05-22 RX ADMIN — URSODIOL 300 MG: 300 CAPSULE ORAL at 08:05

## 2018-05-22 RX ADMIN — METOCLOPRAMIDE HYDROCHLORIDE 10 MG: 5 TABLET ORAL at 06:05

## 2018-05-22 RX ADMIN — METOCLOPRAMIDE HYDROCHLORIDE 10 MG: 5 TABLET ORAL at 08:05

## 2018-05-22 RX ADMIN — METOCLOPRAMIDE HYDROCHLORIDE 10 MG: 5 TABLET ORAL at 04:05

## 2018-05-22 RX ADMIN — OXYCODONE HYDROCHLORIDE 10 MG: 5 TABLET ORAL at 04:05

## 2018-05-22 RX ADMIN — OXYCODONE HYDROCHLORIDE 10 MG: 5 TABLET ORAL at 08:05

## 2018-05-22 RX ADMIN — METOCLOPRAMIDE HYDROCHLORIDE 10 MG: 5 TABLET ORAL at 11:05

## 2018-05-22 RX ADMIN — PREDNISONE 60 MG: 10 TABLET ORAL at 08:05

## 2018-05-22 RX ADMIN — FLUCONAZOLE 200 MG: 200 TABLET ORAL at 08:05

## 2018-05-22 RX ADMIN — FAMOTIDINE 20 MG: 20 TABLET ORAL at 08:05

## 2018-05-22 RX ADMIN — DRONABINOL 2.5 MG: 2.5 CAPSULE ORAL at 08:05

## 2018-05-22 RX ADMIN — SEVELAMER CARBONATE 800 MG: 800 TABLET, FILM COATED ORAL at 12:05

## 2018-05-22 RX ADMIN — GABAPENTIN 100 MG: 100 CAPSULE ORAL at 08:05

## 2018-05-22 RX ADMIN — LEVETIRACETAM 500 MG: 500 TABLET ORAL at 08:05

## 2018-05-22 RX ADMIN — OXYCODONE HYDROCHLORIDE 10 MG: 5 TABLET ORAL at 12:05

## 2018-05-22 RX ADMIN — LEVOTHYROXINE SODIUM 75 MCG: 25 TABLET ORAL at 06:05

## 2018-05-22 RX ADMIN — CYCLOSPORINE 50 MG: 25 CAPSULE, LIQUID FILLED ORAL at 08:05

## 2018-05-22 RX ADMIN — CYCLOSPORINE 100 MG: 100 CAPSULE, LIQUID FILLED ORAL at 06:05

## 2018-05-22 RX ADMIN — SEVELAMER CARBONATE 800 MG: 800 TABLET, FILM COATED ORAL at 06:05

## 2018-05-22 RX ADMIN — AMLODIPINE BESYLATE 5 MG: 5 TABLET ORAL at 08:05

## 2018-05-22 RX ADMIN — BISACODYL 10 MG: 5 TABLET, COATED ORAL at 08:05

## 2018-05-22 RX ADMIN — SEVELAMER CARBONATE 800 MG: 800 TABLET, FILM COATED ORAL at 07:05

## 2018-05-22 NOTE — PLAN OF CARE
Problem: Patient Care Overview  Goal: Plan of Care Review  Outcome: Ongoing (interventions implemented as appropriate)  AOx4, VSS, C/O abdominal/back pain 7/10 - PRN medication given; pt reported moderate relief obtained. Abdomen remains distended and firm. PIV site d/t be changed, however pt refused. Requesting PIV stay in place until d/c. Will inform AM RN. Remains free from falls. Bed remains locked and lowered. Personal items and call light w/in reach. NAD, VENKAT.

## 2018-05-22 NOTE — PROGRESS NOTES
EZEKIEL spoke with Ch[place],  at [facility] today, that pt is planning for dc from hospital tomorrow and would return to unit for HD on Friday.   EZEKIEL confirmed pt is still HD MF.  No other psychosocial needs at this time.  EZEKIEL will continue to follow for all dc planning needs.

## 2018-05-22 NOTE — PROGRESS NOTES
[facility]  Liver Transplant  Progress Note    Patient Name: Jhonny Diana  MRN: [id number]  Admission Date: 2018  Hospital Length of Stay: 13 days  Code Status: Full Code  Primary Care Provider: Primary Doctor No  Post-Operative Day: 215    ORGAN:   LIVER  Disease Etiology: Acute Alcoholic Hepatitis  Donor Type:    - Brain Death  CDC High Risk:   No  Donor CMV Status:   Donor CMV Status: Positive  Donor HBcAB:   Negative  Donor HCV Status:   Negative  Whole or Partial: Whole Liver  Biliary Anastomosis: End to End  Arterial Anatomy: Standard  Subjective:     History of Present Illness:  Mr. 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  S/p OLTX 10/19/2017 (Liver) with steroid induction. Post op course with multiple complications to include biliary stricture, FUO, ESRD, and chronic abdominal pain. Recently discharged on  after an ERCP. Reports that since discharge he has had fevers of 100.4 to 100.8. These fevers resolve on their own without medication. Also reports decreased appetite and fatigue. Nausea and abdominal pain are baseline and unchanged from prior to discharge. Normal bowel movements and passing gas. Receiving dialysis Monday and Friday. On admission, patient was afebrile and hemodynamically stable. Labs were stable from prior except tbili which is up to 3.9 from 3.2 yesterday.     Hospital Course:  Interval History: no acute events overnight. Hematology consulted for worsening thrombocytopenia.  Plts improving slowly- 25 again today.  Restarted cya  as improvement of platelets attributed to steroids.  Transitioned to steroids, and will taper down by 10 mg per week until 10mg daily.  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  Prealbumin 10 on , previously 17 a month ago, continue to encourage oral intake.  Appetite has improved greatly  with addition of steroids.  Prealbumin Tuesday 5/22 improved to 20.    Scheduled Meds:   amLODIPine  5 mg Oral Daily    bisacodyl  10 mg Oral Daily    cycloSPORINE modified  50 mg Oral BID    dronabinol  2.5 mg Oral QHS    epoetin maurisio (PROCRIT) injection  20,000 Units Intravenous Every Mon, Fri    ergocalciferol  50,000 Units Oral Q7 Days    famotidine  20 mg Oral QHS    fluconazole  200 mg Oral Daily    gabapentin  100 mg Oral QHS    levETIRAcetam  500 mg Oral BID    levothyroxine  75 mcg Oral Before breakfast    metoclopramide HCl  10 mg Oral QID (AC & HS)    predniSONE  60 mg Oral Daily    Followed by    [START ON 5/28/2018] predniSONE  50 mg Oral Daily    Followed by    [START ON 6/4/2018] predniSONE  40 mg Oral Daily    Followed by    [START ON 6/11/2018] predniSONE  30 mg Oral Daily    Followed by    [START ON 6/18/2018] predniSONE  20 mg Oral Daily    Followed by    [START ON 6/25/2018] predniSONE  10 mg Oral Daily    sevelamer carbonate  800 mg Oral TID WM    ursodiol  300 mg Oral BID     Continuous Infusions:  PRN Meds:sodium chloride 0.9%, bisacodyl, gentamicin, ondansetron, oxyCODONE, prochlorperazine, ramelteon, sodium chloride 0.9%    Review of Systems   Constitutional: Positive for activity change and appetite change. Negative for chills, fatigue and fever.   Respiratory: Negative for cough and shortness of breath.    Cardiovascular: Negative for chest pain and leg swelling.   Gastrointestinal: Positive for abdominal distention and abdominal pain. Negative for constipation, nausea and vomiting.   Genitourinary: Positive for decreased urine volume and flank pain.   Musculoskeletal: Negative for arthralgias, back pain and myalgias.   Skin: Negative for color change and wound.   Allergic/Immunologic: Positive for immunocompromised state.   Neurological: Positive for weakness. Negative for dizziness, syncope and headaches.   Psychiatric/Behavioral: Positive for dysphoric mood. The  patient is nervous/anxious.      Objective:     Vital Signs (Most Recent):  Temp: 98 °F (36.7 °C) (05/22/18 0730)  Pulse: 64 (05/22/18 0730)  Resp: 20 (05/22/18 0730)  BP: 132/80 (05/22/18 0730)  SpO2: 98 % (05/22/18 0730) Vital Signs (24h Range):  Temp:  [97.8 °F (36.6 °C)-98.8 °F (37.1 °C)] 98 °F (36.7 °C)  Pulse:  [50-88] 64  Resp:  [16-20] 20  SpO2:  [97 %-100 %] 98 %  BP: (125-135)/(75-91) 132/80     Weight: 58.9 kg (129 lb 14 oz)  Body mass index is 20.97 kg/m².    Intake/Output - Last 3 Shifts       05/20 0700 - 05/21 0659 05/21 0700 - 05/22 0659 05/22 0700 - 05/23 0659    P.O. 1080 1080     I.V. (mL/kg) 0 (0) 0 (0)     Other 0 650     Total Intake(mL/kg) 1080 (18.3) 1730 (29.4)     Urine (mL/kg/hr) 350 (0.2) 120 (0.1)     Emesis/NG output 0 (0) 0 (0)     Other 0 (0) 2650 (1.9)     Stool 0 (0) 0 (0)     Blood 0 (0) 0 (0)     Total Output 350 2770      Net +730 -1040             Urine Occurrence 451 x 2 x     Stool Occurrence 1 x 0 x     Emesis Occurrence 0 x 0 x           Physical Exam   Constitutional: He is oriented to person, place, and time. He appears well-developed. He is cooperative.   Hand and temporal muscle wasting   HENT:   Head: Normocephalic and atraumatic.   Eyes: Pupils are equal, round, and reactive to light. Scleral icterus is present.   Neck: Normal range of motion.   Cardiovascular: Normal rate, regular rhythm, normal heart sounds, intact distal pulses and normal pulses.    Pulmonary/Chest: Effort normal. No respiratory distress. He has decreased breath sounds in the right lower field and the left lower field. He has no rales.   Abdominal: Soft. Bowel sounds are normal. He exhibits distension. There is generalized tenderness.   Musculoskeletal: Normal range of motion. He exhibits no edema.   Neurological: He is alert and oriented to person, place, and time.   Skin: Skin is warm and dry.   Psychiatric: He has a normal mood and affect. His behavior is normal. Judgment and thought content  normal.   Nursing note and vitals reviewed.      Laboratory:  Immunosuppressants         Stop Route Frequency     cycloSPORINE modified (NEORAL) capsule 50 mg      -- Oral 2 times daily        CBC:     Recent Labs  Lab 05/22/18 0427   WBC 3.37*   RBC 2.88*   HGB 9.1*   HCT 27.7*   PLT 25*   MCV 96   MCH 31.6*   MCHC 32.9     CMP:     Recent Labs  Lab 05/22/18 0427   *   CALCIUM 8.7   ALBUMIN 2.5*   PROT 6.4   *   K 3.8   CO2 23   CL 98   BUN 31*   CREATININE 2.4*   ALKPHOS 133   ALT 6*   AST 14   BILITOT 1.5*     Coagulation:     Recent Labs  Lab 05/22/18 0427   INR 1.1     Labs within the past 24 hours have been reviewed.    Diagnostic Results:  I have personally reviewed all pertinent imaging studies.    Assessment/Plan:     Other pancytopenia    - Hem/onc consulted for worsening thrombocytopenia despite platelet transfusion  - attempted bone marrow biopsy 5/14 without success due to pain, able to obtain small dry sample, heme to attempt to run test on biopsy  - HIT panel negative.   - CT chest/abd/pelvis 5/15 to evaluate lymph nodes - reviewed with no lymphadenopathy  - will follow Heme recommendations, treat with steroids  - restarted cyclosporine 5/21          Severe protein-calorie malnutrition    - continue dronabinol for appetite stimulant  - po supplements  - appetite improving   - dietary following  - prealbumin 10 on 5/14, previously 17 a month ago, recheck Tuesday 5/22 improved to 20        Anemia of chronic renal failure    - h/h chronically low  - continue epoetin  - hemolysis labs concerning - hematology consulted & following  - H/H improved s/p 1 unit of PRBC 5/12.   - transfused 1 unit prbc 5/16 with good response  - monitor cbc daily        Biliary stricture of transplanted liver    - s/p ERCP 5/4  - stent in place per imaging  - PBS consulted for possible ERCP - not recommending ERCP for now  - bili now improving, monitor        S/P liver transplant    - LFTs stable  - liver u/s  reviewed - unremarkable  - monitor        Constipation    - suppository 5/17- pt refused.  - continue to decrease pain medication  - continue bisacodyl orally  - enema ordered, ,but deferred  - had success with mag citrate          Long-term use of immunosuppressant medication    - See prophylactic immunosuppression.        Prophylactic immunotherapy    - cyclosporine on HOLD- awaiting improvement in plt.  - monitor cya levels daily         Seizure    - no seizure activity noted          At risk for opportunistic infections    - as now treatment dose steroids  - pentamidine for pcp prophylaxis  - valcyte on hold as neutropenic, continue surveillance CMV PCR  - fluconazole for antigungal        Thrombocytopenia    - see other pancytopenia.  PLt 15 today.        Hyperphosphatemia    - continue sevelamer carbonate  - monitor labs daily        Vitamin D deficiency    - continue ergo replacement          ESRD Monday/Friday    - nephrology following  - making some urine, however not clearing  - Monday/friday dialysis schedule, last HD 5/21            VTE Risk Mitigation         Ordered     IP VTE HIGH RISK PATIENT  Once      05/09/18 0044          The patients clinical status was discussed at multidisplinary rounds, involving transplant surgery, transplant medicine, pharmacy, nursing, nutrition, and social work    Discharge Planning:  Monitor kidney function --> may need outpt HD  Monitor HH needs vs rehab for deconditioned status      Seda Camacho, NP  Liver Transplant  [facility]

## 2018-05-22 NOTE — ASSESSMENT & PLAN NOTE
- nephrology following  - making some urine, however not clearing  - Monday/friday dialysis schedule, last HD 5/21

## 2018-05-22 NOTE — ASSESSMENT & PLAN NOTE
- Hem/onc consulted for worsening thrombocytopenia despite platelet transfusion  - attempted bone marrow biopsy 5/14 without success due to pain, able to obtain small dry sample, heme to attempt to run test on biopsy  - HIT panel negative.   - CT chest/abd/pelvis 5/15 to evaluate lymph nodes - reviewed with no lymphadenopathy  - will follow Heme recommendations, treat with steroids  - restarted cyclosporine 5/21

## 2018-05-22 NOTE — SUBJECTIVE & OBJECTIVE
Scheduled Meds:   amLODIPine  5 mg Oral Daily    bisacodyl  10 mg Oral Daily    cycloSPORINE modified  50 mg Oral BID    dronabinol  2.5 mg Oral QHS    epoetin maurisio (PROCRIT) injection  20,000 Units Intravenous Every Mon, Fri    ergocalciferol  50,000 Units Oral Q7 Days    famotidine  20 mg Oral QHS    fluconazole  200 mg Oral Daily    gabapentin  100 mg Oral QHS    levETIRAcetam  500 mg Oral BID    levothyroxine  75 mcg Oral Before breakfast    metoclopramide HCl  10 mg Oral QID (AC & HS)    predniSONE  60 mg Oral Daily    Followed by    [START ON 5/28/2018] predniSONE  50 mg Oral Daily    Followed by    [START ON 6/4/2018] predniSONE  40 mg Oral Daily    Followed by    [START ON 6/11/2018] predniSONE  30 mg Oral Daily    Followed by    [START ON 6/18/2018] predniSONE  20 mg Oral Daily    Followed by    [START ON 6/25/2018] predniSONE  10 mg Oral Daily    sevelamer carbonate  800 mg Oral TID WM    ursodiol  300 mg Oral BID     Continuous Infusions:  PRN Meds:sodium chloride 0.9%, bisacodyl, gentamicin, ondansetron, oxyCODONE, prochlorperazine, ramelteon, sodium chloride 0.9%    Review of Systems   Constitutional: Positive for activity change and appetite change. Negative for chills, fatigue and fever.   Respiratory: Negative for cough and shortness of breath.    Cardiovascular: Negative for chest pain and leg swelling.   Gastrointestinal: Positive for abdominal distention and abdominal pain. Negative for constipation, nausea and vomiting.   Genitourinary: Positive for decreased urine volume and flank pain.   Musculoskeletal: Negative for arthralgias, back pain and myalgias.   Skin: Negative for color change and wound.   Allergic/Immunologic: Positive for immunocompromised state.   Neurological: Positive for weakness. Negative for dizziness, syncope and headaches.   Psychiatric/Behavioral: Positive for dysphoric mood. The patient is nervous/anxious.      Objective:     Vital Signs (Most  Recent):  Temp: 98 °F (36.7 °C) (05/22/18 0730)  Pulse: 64 (05/22/18 0730)  Resp: 20 (05/22/18 0730)  BP: 132/80 (05/22/18 0730)  SpO2: 98 % (05/22/18 0730) Vital Signs (24h Range):  Temp:  [97.8 °F (36.6 °C)-98.8 °F (37.1 °C)] 98 °F (36.7 °C)  Pulse:  [50-88] 64  Resp:  [16-20] 20  SpO2:  [97 %-100 %] 98 %  BP: (125-135)/(75-91) 132/80     Weight: 58.9 kg (129 lb 14 oz)  Body mass index is 20.97 kg/m².    Intake/Output - Last 3 Shifts       05/20 0700 - 05/21 0659 05/21 0700 - 05/22 0659 05/22 0700 - 05/23 0659    P.O. 1080 1080     I.V. (mL/kg) 0 (0) 0 (0)     Other 0 650     Total Intake(mL/kg) 1080 (18.3) 1730 (29.4)     Urine (mL/kg/hr) 350 (0.2) 120 (0.1)     Emesis/NG output 0 (0) 0 (0)     Other 0 (0) 2650 (1.9)     Stool 0 (0) 0 (0)     Blood 0 (0) 0 (0)     Total Output 350 2770      Net +730 -1040             Urine Occurrence 451 x 2 x     Stool Occurrence 1 x 0 x     Emesis Occurrence 0 x 0 x           Physical Exam   Constitutional: He is oriented to person, place, and time. He appears well-developed. He is cooperative.   Hand and temporal muscle wasting   HENT:   Head: Normocephalic and atraumatic.   Eyes: Pupils are equal, round, and reactive to light. Scleral icterus is present.   Neck: Normal range of motion.   Cardiovascular: Normal rate, regular rhythm, normal heart sounds, intact distal pulses and normal pulses.    Pulmonary/Chest: Effort normal. No respiratory distress. He has decreased breath sounds in the right lower field and the left lower field. He has no rales.   Abdominal: Soft. Bowel sounds are normal. He exhibits distension. There is generalized tenderness.   Musculoskeletal: Normal range of motion. He exhibits no edema.   Neurological: He is alert and oriented to person, place, and time.   Skin: Skin is warm and dry.   Psychiatric: He has a normal mood and affect. His behavior is normal. Judgment and thought content normal.   Nursing note and vitals  reviewed.      Laboratory:  Immunosuppressants         Stop Route Frequency     cycloSPORINE modified (NEORAL) capsule 50 mg      -- Oral 2 times daily        CBC:     Recent Labs  Lab 05/22/18 0427   WBC 3.37*   RBC 2.88*   HGB 9.1*   HCT 27.7*   PLT 25*   MCV 96   MCH 31.6*   MCHC 32.9     CMP:     Recent Labs  Lab 05/22/18 0427   *   CALCIUM 8.7   ALBUMIN 2.5*   PROT 6.4   *   K 3.8   CO2 23   CL 98   BUN 31*   CREATININE 2.4*   ALKPHOS 133   ALT 6*   AST 14   BILITOT 1.5*     Coagulation:     Recent Labs  Lab 05/22/18 0427   INR 1.1     Labs within the past 24 hours have been reviewed.    Diagnostic Results:  I have personally reviewed all pertinent imaging studies.

## 2018-05-22 NOTE — ASSESSMENT & PLAN NOTE
- continue dronabinol for appetite stimulant  - po supplements  - appetite improving   - dietary following  - prealbumin 10 on 5/14, previously 17 a month ago, recheck Tuesday 5/22 improved to 20

## 2018-05-23 VITALS
TEMPERATURE: 98 F | SYSTOLIC BLOOD PRESSURE: 130 MMHG | HEART RATE: 70 BPM | RESPIRATION RATE: 18 BRPM | HEIGHT: 66 IN | OXYGEN SATURATION: 99 % | WEIGHT: 129.88 LBS | BODY MASS INDEX: 20.87 KG/M2 | DIASTOLIC BLOOD PRESSURE: 75 MMHG

## 2018-05-23 PROBLEM — E83.39 HYPERPHOSPHATEMIA: Status: RESOLVED | Noted: 2018-04-04 | Resolved: 2018-05-23

## 2018-05-23 PROBLEM — R00.1 BRADYCARDIA: Status: RESOLVED | Noted: 2018-05-18 | Resolved: 2018-05-23

## 2018-05-23 LAB
ALBUMIN SERPL BCP-MCNC: 2.5 G/DL
ALP SERPL-CCNC: 117 U/L
ALT SERPL W/O P-5'-P-CCNC: 6 U/L
ANION GAP SERPL CALC-SCNC: 13 MMOL/L
AST SERPL-CCNC: 13 U/L
BASOPHILS # BLD AUTO: 0.01 K/UL
BASOPHILS NFR BLD: 0.2 %
BILIRUB SERPL-MCNC: 1.5 MG/DL
BUN SERPL-MCNC: 46 MG/DL
CALCIUM SERPL-MCNC: 8.7 MG/DL
CHLORIDE SERPL-SCNC: 102 MMOL/L
CMV DNA SERPL NAA+PROBE-ACNC: NORMAL IU/ML
CO2 SERPL-SCNC: 21 MMOL/L
CREAT SERPL-MCNC: 2.9 MG/DL
CYCLOSPORINE BLD LC/MS/MS-MCNC: 123 NG/ML
CYCLOSPORINE BLD LC/MS/MS-MCNC: 123 NG/ML
DIFFERENTIAL METHOD: ABNORMAL
EOSINOPHIL # BLD AUTO: 0 K/UL
EOSINOPHIL NFR BLD: 0 %
ERYTHROCYTE [DISTWIDTH] IN BLOOD BY AUTOMATED COUNT: 19 %
EST. GFR  (AFRICAN AMERICAN): 32.5 ML/MIN/1.73 M^2
EST. GFR  (NON AFRICAN AMERICAN): 28.1 ML/MIN/1.73 M^2
GLUCOSE SERPL-MCNC: 113 MG/DL
HCT VFR BLD AUTO: 27 %
HGB BLD-MCNC: 8.6 G/DL
IMM GRANULOCYTES # BLD AUTO: 0.07 K/UL
IMM GRANULOCYTES NFR BLD AUTO: 1.6 %
INR PPP: 1.1
LYMPHOCYTES # BLD AUTO: 0.7 K/UL
LYMPHOCYTES NFR BLD: 16.4 %
MAGNESIUM SERPL-MCNC: 2 MG/DL
MCH RBC QN AUTO: 31.4 PG
MCHC RBC AUTO-ENTMCNC: 31.9 G/DL
MCV RBC AUTO: 99 FL
MONOCYTES # BLD AUTO: 0.5 K/UL
MONOCYTES NFR BLD: 12.1 %
NEUTROPHILS # BLD AUTO: 3.1 K/UL
NEUTROPHILS NFR BLD: 69.7 %
NRBC BLD-RTO: 0 /100 WBC
PHOSPHATE SERPL-MCNC: 3.7 MG/DL
PLATELET # BLD AUTO: 29 K/UL
PMV BLD AUTO: 12.4 FL
POTASSIUM SERPL-SCNC: 4 MMOL/L
PROT SERPL-MCNC: 6.1 G/DL
PROTHROMBIN TIME: 11.3 SEC
RBC # BLD AUTO: 2.74 M/UL
RETICS/RBC NFR AUTO: 7.7 %
SODIUM SERPL-SCNC: 136 MMOL/L
WBC # BLD AUTO: 4.46 K/UL

## 2018-05-23 PROCEDURE — 25000003 PHARM REV CODE 250: Mod: NTX | Performed by: STUDENT IN AN ORGANIZED HEALTH CARE EDUCATION/TRAINING PROGRAM

## 2018-05-23 PROCEDURE — 80158 DRUG ASSAY CYCLOSPORINE: CPT | Mod: NTX

## 2018-05-23 PROCEDURE — 80053 COMPREHEN METABOLIC PANEL: CPT | Mod: NTX

## 2018-05-23 PROCEDURE — 36415 COLL VENOUS BLD VENIPUNCTURE: CPT | Mod: NTX

## 2018-05-23 PROCEDURE — 25000003 PHARM REV CODE 250: Mod: NTX | Performed by: NURSE PRACTITIONER

## 2018-05-23 PROCEDURE — 99233 SBSQ HOSP IP/OBS HIGH 50: CPT | Mod: NTX,,, | Performed by: NURSE PRACTITIONER

## 2018-05-23 PROCEDURE — 25000003 PHARM REV CODE 250: Mod: NTX | Performed by: PHYSICIAN ASSISTANT

## 2018-05-23 PROCEDURE — 63600175 PHARM REV CODE 636 W HCPCS: Mod: NTX | Performed by: NURSE PRACTITIONER

## 2018-05-23 PROCEDURE — 84100 ASSAY OF PHOSPHORUS: CPT | Mod: NTX

## 2018-05-23 PROCEDURE — 85045 AUTOMATED RETICULOCYTE COUNT: CPT | Mod: NTX

## 2018-05-23 PROCEDURE — 85610 PROTHROMBIN TIME: CPT | Mod: NTX

## 2018-05-23 PROCEDURE — 83735 ASSAY OF MAGNESIUM: CPT | Mod: NTX

## 2018-05-23 PROCEDURE — 85025 COMPLETE CBC W/AUTO DIFF WBC: CPT | Mod: NTX

## 2018-05-23 RX ORDER — GABAPENTIN 100 MG/1
100 CAPSULE ORAL NIGHTLY
Qty: 30 CAPSULE | Refills: 11 | Status: ON HOLD | OUTPATIENT
Start: 2018-05-23 | End: 2018-06-04

## 2018-05-23 RX ORDER — CYCLOSPORINE 25 MG/1
25 CAPSULE, LIQUID FILLED ORAL 2 TIMES DAILY
Qty: 60 CAPSULE | Refills: 11 | Status: ON HOLD | OUTPATIENT
Start: 2018-05-23 | End: 2018-06-04 | Stop reason: HOSPADM

## 2018-05-23 RX ORDER — PREDNISONE 20 MG/1
TABLET ORAL
Qty: 75 TABLET | Refills: 11 | Status: ON HOLD | OUTPATIENT
Start: 2018-05-23 | End: 2018-06-04

## 2018-05-23 RX ORDER — FLUCONAZOLE 200 MG/1
200 TABLET ORAL DAILY
Qty: 30 TABLET | Refills: 0 | Status: SHIPPED | OUTPATIENT
Start: 2018-05-24 | End: 2018-06-23

## 2018-05-23 RX ORDER — AMLODIPINE BESYLATE 5 MG/1
5 TABLET ORAL DAILY
Qty: 30 TABLET | Refills: 11 | Status: SHIPPED | OUTPATIENT
Start: 2018-05-23 | End: 2019-06-18 | Stop reason: SDUPTHER

## 2018-05-23 RX ORDER — OXYCODONE HYDROCHLORIDE 10 MG/1
5-10 TABLET ORAL EVERY 4 HOURS PRN
Qty: 20 TABLET | Refills: 0 | Status: ON HOLD | OUTPATIENT
Start: 2018-05-23 | End: 2018-06-13 | Stop reason: HOSPADM

## 2018-05-23 RX ORDER — BISACODYL 5 MG
10 TABLET, DELAYED RELEASE (ENTERIC COATED) ORAL DAILY PRN
Qty: 60 TABLET | Refills: 0 | Status: CANCELLED | COMMUNITY
Start: 2018-05-23

## 2018-05-23 RX ORDER — FAMOTIDINE 20 MG/1
20 TABLET, FILM COATED ORAL NIGHTLY
Qty: 30 TABLET | Refills: 11 | Status: SHIPPED | OUTPATIENT
Start: 2018-05-23 | End: 2019-04-12

## 2018-05-23 RX ORDER — DOCUSATE SODIUM 100 MG/1
100 CAPSULE, LIQUID FILLED ORAL 2 TIMES DAILY
Refills: 0 | COMMUNITY
Start: 2018-05-23 | End: 2018-06-19

## 2018-05-23 RX ADMIN — OXYCODONE HYDROCHLORIDE 10 MG: 5 TABLET ORAL at 07:05

## 2018-05-23 RX ADMIN — CYCLOSPORINE 100 MG: 100 CAPSULE, LIQUID FILLED ORAL at 08:05

## 2018-05-23 RX ADMIN — BISACODYL 10 MG: 10 SUPPOSITORY RECTAL at 08:05

## 2018-05-23 RX ADMIN — LEVOTHYROXINE SODIUM 75 MCG: 25 TABLET ORAL at 05:05

## 2018-05-23 RX ADMIN — SEVELAMER CARBONATE 800 MG: 800 TABLET, FILM COATED ORAL at 08:05

## 2018-05-23 RX ADMIN — FLUCONAZOLE 200 MG: 200 TABLET ORAL at 08:05

## 2018-05-23 RX ADMIN — OXYCODONE HYDROCHLORIDE 10 MG: 5 TABLET ORAL at 12:05

## 2018-05-23 RX ADMIN — METOCLOPRAMIDE HYDROCHLORIDE 10 MG: 5 TABLET ORAL at 05:05

## 2018-05-23 RX ADMIN — OXYCODONE HYDROCHLORIDE 10 MG: 5 TABLET ORAL at 05:05

## 2018-05-23 RX ADMIN — OXYCODONE HYDROCHLORIDE 10 MG: 5 TABLET ORAL at 09:05

## 2018-05-23 RX ADMIN — AMLODIPINE BESYLATE 5 MG: 5 TABLET ORAL at 08:05

## 2018-05-23 RX ADMIN — BISACODYL 10 MG: 10 SUPPOSITORY RECTAL at 05:05

## 2018-05-23 RX ADMIN — PREDNISONE 60 MG: 10 TABLET ORAL at 08:05

## 2018-05-23 RX ADMIN — URSODIOL 300 MG: 300 CAPSULE ORAL at 08:05

## 2018-05-23 RX ADMIN — OXYCODONE HYDROCHLORIDE 10 MG: 5 TABLET ORAL at 01:05

## 2018-05-23 RX ADMIN — LEVETIRACETAM 500 MG: 500 TABLET ORAL at 08:05

## 2018-05-23 RX ADMIN — BISACODYL 10 MG: 5 TABLET, COATED ORAL at 08:05

## 2018-05-23 NOTE — PROGRESS NOTES
Discharge Medication Note:    Hospital Course:  Mr Diana is s/p liver transplant from 10/19/17 admitted for fevers post ERCP, decreased appetite and fatigue.  Mr Diana also developed thrombocytopenia for which hematology was consulted.  Bone marrow biopsy was performed and patient was started on high dose hydrocortisone and CsA was held.  Platelets improved from a low of 4 to 29 at time of discharge and patient feeling well.  CsA restarted.  Will plan on tapering high dose prednisone from 60mg (1mg/kg) down to 10mg daily.  Of note, decision was made to resume OI prophylaxis - patient discharged with fluconazole x30 days, received pentam 5/21 (will continue for a total of 6 months), and will need weekly CMV PCRs (last 5/22, pending).  Other med changes include: starting amlodipine 5mg and gabapentin 100mg nightly.  Patient given rx for oxycodone 10mg #20 - will need to follow up with pain specialist.      Met with Jhonny Diana at discharge to review discharge medications and to update the blue medication card.       Jhonny Diana   Home Medication Instructions NISHI:[id number]    Printed on:05/23/18 4370   Medication Information                      amLODIPine (NORVASC) 5 MG tablet  Take 1 tablet (5 mg total) by mouth once daily.             bisacodyl (DULCOLAX) 5 mg EC tablet  Take 2 tablets (10 mg total) by mouth once daily.             cycloSPORINE modified, NEORAL, (NEORAL) 100 MG capsule  Take 1 capsule (100 mg total) by mouth 2 (two) times daily.             cycloSPORINE modified, NEORAL, 25 MG capsule  Take 1 capsule (25 mg total) by mouth 2 (two) times daily. Total 125mg BID             docusate sodium (COLACE) 100 MG capsule  Take 1 capsule (100 mg total) by mouth 2 (two) times daily.             dronabinol (MARINOL) 2.5 MG capsule  Take 1 capsule (2.5 mg total) by mouth every evening.             ergocalciferol (ERGOCALCIFEROL) 50,000 unit Cap  Take 1 capsule (50,000 Units total) by mouth every 7 days.              famotidine (PEPCID) 20 MG tablet  Take 1 tablet (20 mg total) by mouth every evening.             fluconazole (DIFLUCAN) 200 MG Tab  Take 1 tablet (200 mg total) by mouth once daily. STOP 6/23/18             gabapentin (NEURONTIN) 100 MG capsule  Take 1 capsule (100 mg total) by mouth every evening.             levETIRAcetam (KEPPRA) 500 MG Tab  Take 1 tablet (500 mg total) by mouth 2 (two) times daily.             levothyroxine (SYNTHROID) 75 MCG tablet  Take 1 tablet (75 mcg total) by mouth before breakfast.             metoclopramide HCl (REGLAN) 10 MG tablet  Take 1 tablet (10 mg total) by mouth 4 (four) times daily before meals and nightly.             ondansetron (ZOFRAN-ODT) 8 MG TbDL  Take 1 tablet (8 mg total) by mouth every 8 (eight) hours as needed (nausea).             oxyCODONE (ROXICODONE) 10 mg Tab immediate release tablet  Take 0.5-1 tablets (5-10 mg total) by mouth every 4 (four) hours as needed for Pain.             predniSONE (DELTASONE) 20 MG tablet  Take daily by mouth: 60mg (3 tab) 5/21-5/27, 50mg (2.5 tab) 5/28-6/3, 40mg (2 tab) 6/4-6/10, 30mg (1.5 tab) 6/11-6/17, 20mg (1 tab) 6/18-6/24, then 10mg (0.5 tab) daily beginning 6/25             sevelamer carbonate (RENVELA) 800 mg Tab  Take 1 tablet (800 mg total) by mouth 3 (three) times daily with meals.             ursodiol (ACTIGALL) 300 mg capsule  Take 1 capsule (300 mg total) by mouth 2 (two) times daily.                 Pt was provided with prescriptions for the following meds:  E-rx: Prednisone 20mg tabs, amlodipine, pepcid, gabapentin, fluconazole, oxycodone 10mg #20  Printed rx: none  Phone-in or faxed rx: none to none pharmacy per pt request.    The following medications have been placed on HOLD and should be restarted in the outpatient setting (when appropriate): none    Jhonny Diana verbalized understanding and had the opportunity to ask questions.

## 2018-05-23 NOTE — PLAN OF CARE
Problem: Patient Care Overview  Goal: Plan of Care Review  Outcome: Ongoing (interventions implemented as appropriate)  AOx4, VSS, C/O abdominal/back pain 7/10 - PRN medication given; pt reported moderate relief obtained. Abdomen remains distended and firm. PIV site d/t be changed, however pt w/ anticipated d/c in AM. Remains free from falls. Bed remains locked and lowered. Personal items and call light w/in reach. NAD, WCKILO.

## 2018-05-23 NOTE — PROGRESS NOTES
Discharge Note;    EZEKIEL met with patient and pts mom Sanjuanita at bedside today to discuss dc plans.   Pt presents alert and oriented x 4, good eye contact, engaging, asking and answering questions.  Pt and pts mom in agreement with dc plans.  Pt reports coping appropriately at this time.  Pts mom coping much better.   Pt will dc back to [facility] [address] under the care of his mom Sanjuanita Diana, [phone].    Pt will return to outpatient HD on Friday of this week at 3:30 pm.   SW had contact [place] with [facility] ([phone]) on yesterday, so they are expecting him on Friday.  He dialyzes M and F every week.  Pt with no other psychosocial needs for dc at this time.   Pts mom asking about an appt scheduled for pt on 5/29/18 and she is not sure why he has this appt.  EZEKIEL spoke with NP, Mandy, who directed SW and pts mom to speak with the pts post liver nurse Isabel Solano.   EZEKIEL sent a msg to Isabel this morning, asking her to call pt and pts mom about that upcoming appt.    Pt and pts mom aware of how to contact transplant SW outpatient for any psychosocial needs or coping issues.   SW remains available for all transplant resources, education and psychosocial support.

## 2018-05-25 ENCOUNTER — TELEPHONE (OUTPATIENT)
Dept: TRANSPLANT | Facility: CLINIC | Age: 28
End: 2018-05-25

## 2018-05-25 ENCOUNTER — LAB VISIT (OUTPATIENT)
Dept: LAB | Facility: HOSPITAL | Age: 28
End: 2018-05-25
Attending: INTERNAL MEDICINE
Payer: COMMERCIAL

## 2018-05-25 ENCOUNTER — TELEPHONE (OUTPATIENT)
Dept: HEMATOLOGY/ONCOLOGY | Facility: CLINIC | Age: 28
End: 2018-05-25

## 2018-05-25 DIAGNOSIS — Z94.4 LIVER REPLACED BY TRANSPLANT: ICD-10-CM

## 2018-05-25 DIAGNOSIS — D69.6 THROMBOCYTOPENIA: Primary | ICD-10-CM

## 2018-05-25 LAB
ALBUMIN SERPL BCP-MCNC: 2.7 G/DL
ALP SERPL-CCNC: 115 U/L
ALT SERPL W/O P-5'-P-CCNC: 8 U/L
ANION GAP SERPL CALC-SCNC: 14 MMOL/L
ANISOCYTOSIS BLD QL SMEAR: SLIGHT
AST SERPL-CCNC: 13 U/L
BASOPHILS # BLD AUTO: 0.01 K/UL
BASOPHILS NFR BLD: 0.2 %
BILIRUB SERPL-MCNC: 1.5 MG/DL
BUN SERPL-MCNC: 84 MG/DL
CALCIUM SERPL-MCNC: 9.1 MG/DL
CHLORIDE SERPL-SCNC: 107 MMOL/L
CO2 SERPL-SCNC: 20 MMOL/L
CREAT SERPL-MCNC: 4.8 MG/DL
CYCLOSPORINE BLD LC/MS/MS-MCNC: 199 NG/ML
DACRYOCYTES BLD QL SMEAR: ABNORMAL
DIFFERENTIAL METHOD: ABNORMAL
EOSINOPHIL # BLD AUTO: 0 K/UL
EOSINOPHIL NFR BLD: 0 %
ERYTHROCYTE [DISTWIDTH] IN BLOOD BY AUTOMATED COUNT: 18.9 %
EST. GFR  (AFRICAN AMERICAN): 17.7 ML/MIN/1.73 M^2
EST. GFR  (NON AFRICAN AMERICAN): 15.3 ML/MIN/1.73 M^2
GLUCOSE SERPL-MCNC: 121 MG/DL
HCT VFR BLD AUTO: 27.9 %
HGB BLD-MCNC: 8.9 G/DL
HYPOCHROMIA BLD QL SMEAR: ABNORMAL
IMM GRANULOCYTES # BLD AUTO: 0.08 K/UL
IMM GRANULOCYTES NFR BLD AUTO: 1.6 %
LYMPHOCYTES # BLD AUTO: 0.6 K/UL
LYMPHOCYTES NFR BLD: 12.5 %
MCH RBC QN AUTO: 31.7 PG
MCHC RBC AUTO-ENTMCNC: 31.9 G/DL
MCV RBC AUTO: 99 FL
MONOCYTES # BLD AUTO: 0.5 K/UL
MONOCYTES NFR BLD: 10.5 %
NEUTROPHILS # BLD AUTO: 3.7 K/UL
NEUTROPHILS NFR BLD: 75.2 %
NRBC BLD-RTO: 0 /100 WBC
OVALOCYTES BLD QL SMEAR: ABNORMAL
PLATELET # BLD AUTO: 23 K/UL
PLATELET BLD QL SMEAR: ABNORMAL
PMV BLD AUTO: 11.1 FL
POIKILOCYTOSIS BLD QL SMEAR: SLIGHT
POLYCHROMASIA BLD QL SMEAR: ABNORMAL
POTASSIUM SERPL-SCNC: 4.5 MMOL/L
PROT SERPL-MCNC: 6.4 G/DL
RBC # BLD AUTO: 2.81 M/UL
SODIUM SERPL-SCNC: 141 MMOL/L
WBC # BLD AUTO: 4.95 K/UL

## 2018-05-25 PROCEDURE — 36415 COLL VENOUS BLD VENIPUNCTURE: CPT | Mod: NTX

## 2018-05-25 PROCEDURE — 80158 DRUG ASSAY CYCLOSPORINE: CPT | Mod: NTX

## 2018-05-25 PROCEDURE — 80053 COMPREHEN METABOLIC PANEL: CPT | Mod: TXP

## 2018-05-25 PROCEDURE — 85025 COMPLETE CBC W/AUTO DIFF WBC: CPT | Mod: NTX

## 2018-05-25 NOTE — TELEPHONE ENCOUNTER
Labs reviewed no changes at this time will repeat next week. Pt notified will continue to monitor platelets  Request issued for heme/oncology appt

## 2018-05-25 NOTE — TELEPHONE ENCOUNTER
----- Message from Isis Cavazos MD sent at 5/25/2018  3:24 PM CDT -----  Contact: Anne- Transplant   With whoever, You can put him on my schedule...  ----- Message -----  From: Federica Howell RN  Sent: 5/25/2018   2:54 PM  To: Isis Cavazos MD    pls advise who to schedule patient with.  Was seen at hospital.    Thanks,  Federica    ----- Message -----  From: Jevon Maurice  Sent: 5/25/2018  12:26 PM  To: [facility]    Referral placed in system. Will like pt to be seen as soon as possible     Pt contact::[phone]  Ext::[phone]

## 2018-05-25 NOTE — TELEPHONE ENCOUNTER
Hematology appt pending . Pt advised to call and reschedule missed pain management appt due to hospital admit. Pt has been notified and agreed with plan

## 2018-05-25 NOTE — TELEPHONE ENCOUNTER
----- Message from Georgina Rogers MD sent at 5/25/2018  1:39 PM CDT -----  Okay.  Thank you.  No, I will not be prescribing narcotics.  He will need to see the pain management specialist.    ----- Message -----  From: Isabel Gilliam RN  Sent: 5/25/2018   1:29 PM  To: Georgina Rogers MD    Platelets low request has been sent for hematology f/u  Also pt mother reports they were told you would take care of his pain med refills...  ----- Message -----  From: Georgina Rogers MD  Sent: 5/25/2018  10:20 AM  To: Isabel Gilliam RN    Labs stable, no change.

## 2018-05-25 NOTE — TELEPHONE ENCOUNTER
----- Message from Georgina Rogers MD sent at 5/25/2018 10:20 AM CDT -----  Labs stable, no change.

## 2018-05-27 ENCOUNTER — HOSPITAL ENCOUNTER (INPATIENT)
Facility: HOSPITAL | Age: 28
LOS: 8 days | Discharge: HOME | DRG: 391 | End: 2018-06-04
Attending: EMERGENCY MEDICINE | Admitting: SURGERY
Payer: COMMERCIAL

## 2018-05-27 DIAGNOSIS — Z91.89 AT RISK FOR OPPORTUNISTIC INFECTIONS: ICD-10-CM

## 2018-05-27 DIAGNOSIS — F41.9 ANXIETY: ICD-10-CM

## 2018-05-27 DIAGNOSIS — D69.6 THROMBOCYTOPENIA: ICD-10-CM

## 2018-05-27 DIAGNOSIS — N18.6 ESRD (END STAGE RENAL DISEASE): ICD-10-CM

## 2018-05-27 DIAGNOSIS — E43 SEVERE PROTEIN-CALORIE MALNUTRITION: ICD-10-CM

## 2018-05-27 DIAGNOSIS — F05 ACUTE CONFUSIONAL STATE: ICD-10-CM

## 2018-05-27 DIAGNOSIS — R10.9 ABDOMINAL PAIN: ICD-10-CM

## 2018-05-27 DIAGNOSIS — N18.5 ANEMIA OF CHRONIC RENAL FAILURE, STAGE 5: ICD-10-CM

## 2018-05-27 DIAGNOSIS — Z94.4 LIVER TRANSPLANTED: Primary | ICD-10-CM

## 2018-05-27 DIAGNOSIS — F05 ACUTE HYPERACTIVE DELIRIUM DUE TO MULTIPLE ETIOLOGIES: ICD-10-CM

## 2018-05-27 DIAGNOSIS — R10.9 ABDOMINAL PAIN, UNSPECIFIED ABDOMINAL LOCATION: ICD-10-CM

## 2018-05-27 DIAGNOSIS — Z94.4 S/P LIVER TRANSPLANT: Chronic | ICD-10-CM

## 2018-05-27 DIAGNOSIS — D63.1 ANEMIA OF CHRONIC RENAL FAILURE, STAGE 5: ICD-10-CM

## 2018-05-27 DIAGNOSIS — Z79.60 LONG-TERM USE OF IMMUNOSUPPRESSANT MEDICATION: Chronic | ICD-10-CM

## 2018-05-27 DIAGNOSIS — R45.89 ANXIETY ABOUT HEALTH: ICD-10-CM

## 2018-05-27 DIAGNOSIS — K59.00 CONSTIPATION, UNSPECIFIED CONSTIPATION TYPE: ICD-10-CM

## 2018-05-27 DIAGNOSIS — Z29.89 PROPHYLACTIC IMMUNOTHERAPY: Chronic | ICD-10-CM

## 2018-05-27 LAB
ALBUMIN SERPL BCP-MCNC: 2.8 G/DL
ALP SERPL-CCNC: 118 U/L
ALT SERPL W/O P-5'-P-CCNC: 14 U/L
AMYLASE SERPL-CCNC: 36 U/L
ANION GAP SERPL CALC-SCNC: 15 MMOL/L
AST SERPL-CCNC: 21 U/L
BASOPHILS # BLD AUTO: 0.01 K/UL
BASOPHILS NFR BLD: 0.1 %
BILIRUB SERPL-MCNC: 1.9 MG/DL
BILIRUB UR QL STRIP: NEGATIVE
BNP SERPL-MCNC: 616 PG/ML
BUN SERPL-MCNC: 72 MG/DL
CALCIUM SERPL-MCNC: 9.2 MG/DL
CHLORIDE SERPL-SCNC: 104 MMOL/L
CLARITY UR REFRACT.AUTO: CLEAR
CO2 SERPL-SCNC: 22 MMOL/L
COLOR UR AUTO: YELLOW
CREAT SERPL-MCNC: 3.3 MG/DL
DIFFERENTIAL METHOD: ABNORMAL
EOSINOPHIL # BLD AUTO: 0 K/UL
EOSINOPHIL NFR BLD: 0.1 %
ERYTHROCYTE [DISTWIDTH] IN BLOOD BY AUTOMATED COUNT: 18.6 %
EST. GFR  (AFRICAN AMERICAN): 27.8 ML/MIN/1.73 M^2
EST. GFR  (NON AFRICAN AMERICAN): 24.1 ML/MIN/1.73 M^2
GLUCOSE SERPL-MCNC: 117 MG/DL
GLUCOSE UR QL STRIP: NEGATIVE
HCT VFR BLD AUTO: 30.6 %
HGB BLD-MCNC: 10 G/DL
HGB UR QL STRIP: NEGATIVE
IMM GRANULOCYTES # BLD AUTO: 0.14 K/UL
IMM GRANULOCYTES NFR BLD AUTO: 1.7 %
INR PPP: 1
KETONES UR QL STRIP: NEGATIVE
LEUKOCYTE ESTERASE UR QL STRIP: NEGATIVE
LIPASE SERPL-CCNC: 18 U/L
LYMPHOCYTES # BLD AUTO: 0.6 K/UL
LYMPHOCYTES NFR BLD: 7.2 %
MAGNESIUM SERPL-MCNC: 2.7 MG/DL
MCH RBC QN AUTO: 32.6 PG
MCHC RBC AUTO-ENTMCNC: 32.7 G/DL
MCV RBC AUTO: 100 FL
MONOCYTES # BLD AUTO: 0.8 K/UL
MONOCYTES NFR BLD: 9.7 %
NEUTROPHILS # BLD AUTO: 6.8 K/UL
NEUTROPHILS NFR BLD: 81.2 %
NITRITE UR QL STRIP: NEGATIVE
NRBC BLD-RTO: 0 /100 WBC
PH UR STRIP: 6 [PH] (ref 5–8)
PHOSPHATE SERPL-MCNC: 5.2 MG/DL
PLATELET # BLD AUTO: 15 K/UL
PMV BLD AUTO: ABNORMAL FL
POTASSIUM SERPL-SCNC: 5 MMOL/L
PROT SERPL-MCNC: 6.9 G/DL
PROT UR QL STRIP: NEGATIVE
PROTHROMBIN TIME: 10.5 SEC
RBC # BLD AUTO: 3.07 M/UL
SODIUM SERPL-SCNC: 141 MMOL/L
SP GR UR STRIP: 1.01 (ref 1–1.03)
URN SPEC COLLECT METH UR: NORMAL
UROBILINOGEN UR STRIP-ACNC: 4 EU/DL
WBC # BLD AUTO: 8.32 K/UL

## 2018-05-27 PROCEDURE — 99285 EMERGENCY DEPT VISIT HI MDM: CPT | Mod: 25,NTX

## 2018-05-27 PROCEDURE — 81003 URINALYSIS AUTO W/O SCOPE: CPT | Mod: NTX

## 2018-05-27 PROCEDURE — 25000003 PHARM REV CODE 250: Mod: NTX | Performed by: STUDENT IN AN ORGANIZED HEALTH CARE EDUCATION/TRAINING PROGRAM

## 2018-05-27 PROCEDURE — 82150 ASSAY OF AMYLASE: CPT | Mod: NTX

## 2018-05-27 PROCEDURE — 87040 BLOOD CULTURE FOR BACTERIA: CPT | Mod: NTX

## 2018-05-27 PROCEDURE — 99285 EMERGENCY DEPT VISIT HI MDM: CPT | Mod: ,,, | Performed by: EMERGENCY MEDICINE

## 2018-05-27 PROCEDURE — 85610 PROTHROMBIN TIME: CPT | Mod: NTX

## 2018-05-27 PROCEDURE — 96361 HYDRATE IV INFUSION ADD-ON: CPT | Mod: NTX

## 2018-05-27 PROCEDURE — 93010 ELECTROCARDIOGRAM REPORT: CPT | Mod: NTX,,, | Performed by: INTERNAL MEDICINE

## 2018-05-27 PROCEDURE — 83880 ASSAY OF NATRIURETIC PEPTIDE: CPT | Mod: NTX

## 2018-05-27 PROCEDURE — 94761 N-INVAS EAR/PLS OXIMETRY MLT: CPT | Mod: NTX

## 2018-05-27 PROCEDURE — 85025 COMPLETE CBC W/AUTO DIFF WBC: CPT | Mod: NTX

## 2018-05-27 PROCEDURE — 83735 ASSAY OF MAGNESIUM: CPT | Mod: NTX

## 2018-05-27 PROCEDURE — 20600001 HC STEP DOWN PRIVATE ROOM: Mod: NTX

## 2018-05-27 PROCEDURE — 63600175 PHARM REV CODE 636 W HCPCS: Mod: NTX | Performed by: EMERGENCY MEDICINE

## 2018-05-27 PROCEDURE — 63600175 PHARM REV CODE 636 W HCPCS: Mod: NTX | Performed by: STUDENT IN AN ORGANIZED HEALTH CARE EDUCATION/TRAINING PROGRAM

## 2018-05-27 PROCEDURE — 96374 THER/PROPH/DIAG INJ IV PUSH: CPT | Mod: NTX

## 2018-05-27 PROCEDURE — 84100 ASSAY OF PHOSPHORUS: CPT | Mod: NTX

## 2018-05-27 PROCEDURE — 80053 COMPREHEN METABOLIC PANEL: CPT | Mod: NTX

## 2018-05-27 PROCEDURE — 83690 ASSAY OF LIPASE: CPT | Mod: NTX

## 2018-05-27 RX ORDER — SEVELAMER CARBONATE 800 MG/1
800 TABLET, FILM COATED ORAL
Status: DISCONTINUED | OUTPATIENT
Start: 2018-05-28 | End: 2018-05-28

## 2018-05-27 RX ORDER — AMLODIPINE BESYLATE 5 MG/1
5 TABLET ORAL DAILY
Status: DISCONTINUED | OUTPATIENT
Start: 2018-05-28 | End: 2018-06-04 | Stop reason: HOSPADM

## 2018-05-27 RX ORDER — SODIUM CHLORIDE 9 MG/ML
INJECTION, SOLUTION INTRAVENOUS CONTINUOUS
Status: DISCONTINUED | OUTPATIENT
Start: 2018-05-27 | End: 2018-05-28

## 2018-05-27 RX ORDER — GABAPENTIN 100 MG/1
100 CAPSULE ORAL NIGHTLY
Status: DISCONTINUED | OUTPATIENT
Start: 2018-05-27 | End: 2018-06-03

## 2018-05-27 RX ORDER — ACETAMINOPHEN 325 MG/1
650 TABLET ORAL EVERY 8 HOURS PRN
Status: DISCONTINUED | OUTPATIENT
Start: 2018-05-27 | End: 2018-05-28

## 2018-05-27 RX ORDER — RAMELTEON 8 MG/1
8 TABLET ORAL NIGHTLY PRN
Status: DISCONTINUED | OUTPATIENT
Start: 2018-05-27 | End: 2018-06-04 | Stop reason: HOSPADM

## 2018-05-27 RX ORDER — ACETAMINOPHEN 325 MG/1
650 TABLET ORAL EVERY 4 HOURS PRN
Status: DISCONTINUED | OUTPATIENT
Start: 2018-05-27 | End: 2018-06-04 | Stop reason: HOSPADM

## 2018-05-27 RX ORDER — PREDNISONE 10 MG/1
60 TABLET ORAL DAILY
Status: DISCONTINUED | OUTPATIENT
Start: 2018-05-28 | End: 2018-06-04 | Stop reason: HOSPADM

## 2018-05-27 RX ORDER — DRONABINOL 2.5 MG/1
2.5 CAPSULE ORAL NIGHTLY
Status: DISCONTINUED | OUTPATIENT
Start: 2018-05-27 | End: 2018-06-04 | Stop reason: HOSPADM

## 2018-05-27 RX ORDER — DOCUSATE SODIUM 100 MG/1
100 CAPSULE, LIQUID FILLED ORAL 2 TIMES DAILY
Status: DISCONTINUED | OUTPATIENT
Start: 2018-05-27 | End: 2018-06-04 | Stop reason: HOSPADM

## 2018-05-27 RX ORDER — BISACODYL 5 MG
10 TABLET, DELAYED RELEASE (ENTERIC COATED) ORAL DAILY
Status: DISCONTINUED | OUTPATIENT
Start: 2018-05-28 | End: 2018-06-04 | Stop reason: HOSPADM

## 2018-05-27 RX ORDER — FAMOTIDINE 20 MG/1
20 TABLET, FILM COATED ORAL NIGHTLY
Status: DISCONTINUED | OUTPATIENT
Start: 2018-05-27 | End: 2018-06-04 | Stop reason: HOSPADM

## 2018-05-27 RX ORDER — ONDANSETRON 8 MG/1
8 TABLET, ORALLY DISINTEGRATING ORAL EVERY 8 HOURS PRN
Status: DISCONTINUED | OUTPATIENT
Start: 2018-05-27 | End: 2018-06-04 | Stop reason: HOSPADM

## 2018-05-27 RX ORDER — OXYCODONE HYDROCHLORIDE 5 MG/1
5 TABLET ORAL EVERY 4 HOURS PRN
Status: DISCONTINUED | OUTPATIENT
Start: 2018-05-27 | End: 2018-06-04 | Stop reason: HOSPADM

## 2018-05-27 RX ORDER — MORPHINE SULFATE 4 MG/ML
2 INJECTION, SOLUTION INTRAMUSCULAR; INTRAVENOUS
Status: COMPLETED | OUTPATIENT
Start: 2018-05-27 | End: 2018-05-27

## 2018-05-27 RX ORDER — LEVETIRACETAM 500 MG/1
500 TABLET ORAL 2 TIMES DAILY
Status: DISCONTINUED | OUTPATIENT
Start: 2018-05-27 | End: 2018-06-04 | Stop reason: HOSPADM

## 2018-05-27 RX ORDER — LACTULOSE 10 G/15ML
10 SOLUTION ORAL 3 TIMES DAILY
Status: DISCONTINUED | OUTPATIENT
Start: 2018-05-27 | End: 2018-05-28

## 2018-05-27 RX ORDER — URSODIOL 300 MG/1
300 CAPSULE ORAL 2 TIMES DAILY
Status: DISCONTINUED | OUTPATIENT
Start: 2018-05-27 | End: 2018-06-04 | Stop reason: HOSPADM

## 2018-05-27 RX ORDER — LEVOTHYROXINE SODIUM 75 UG/1
75 TABLET ORAL
Status: DISCONTINUED | OUTPATIENT
Start: 2018-05-28 | End: 2018-06-04 | Stop reason: HOSPADM

## 2018-05-27 RX ORDER — DIPHENHYDRAMINE HYDROCHLORIDE 50 MG/ML
25 INJECTION INTRAMUSCULAR; INTRAVENOUS EVERY 4 HOURS PRN
Status: DISCONTINUED | OUTPATIENT
Start: 2018-05-27 | End: 2018-06-04 | Stop reason: HOSPADM

## 2018-05-27 RX ORDER — SODIUM CHLORIDE 0.9 % (FLUSH) 0.9 %
3 SYRINGE (ML) INJECTION
Status: DISCONTINUED | OUTPATIENT
Start: 2018-05-27 | End: 2018-06-04 | Stop reason: HOSPADM

## 2018-05-27 RX ADMIN — FAMOTIDINE 20 MG: 20 TABLET ORAL at 09:05

## 2018-05-27 RX ADMIN — SODIUM CHLORIDE: 0.9 INJECTION, SOLUTION INTRAVENOUS at 09:05

## 2018-05-27 RX ADMIN — OXYCODONE HYDROCHLORIDE 5 MG: 5 TABLET ORAL at 11:05

## 2018-05-27 RX ADMIN — URSODIOL 300 MG: 300 CAPSULE ORAL at 09:05

## 2018-05-27 RX ADMIN — LACTULOSE 10 G: 20 SOLUTION ORAL at 11:05

## 2018-05-27 RX ADMIN — DRONABINOL 2.5 MG: 2.5 CAPSULE ORAL at 11:05

## 2018-05-27 RX ADMIN — MORPHINE SULFATE 2 MG: 4 INJECTION INTRAVENOUS at 06:05

## 2018-05-27 RX ADMIN — LEVETIRACETAM 500 MG: 500 TABLET ORAL at 09:05

## 2018-05-27 RX ADMIN — CYCLOSPORINE 125 MG: 100 CAPSULE, LIQUID FILLED ORAL at 11:05

## 2018-05-27 RX ADMIN — DOCUSATE SODIUM 100 MG: 100 CAPSULE, LIQUID FILLED ORAL at 11:05

## 2018-05-27 RX ADMIN — GABAPENTIN 100 MG: 100 CAPSULE ORAL at 09:05

## 2018-05-27 NOTE — ED NOTES
Patient identifiers verified and correct for Jhonny Diana.   LOC: The patient is awake, alert and aware of environment with an appropriate affect, the patient is oriented x 3 and speaking appropriately.   APPEARANCE: Patient appears uncomfortable but in no acute distress, patient is clean and well groomed.  SKIN: The skin is warm and dry, color consistent with ethnicity, patient has normal skin turgor and moist mucus membranes, skin intact, no breakdown or bruising noted.   MUSCULOSKELETAL: Patient moving all extremities spontaneously, pt has swelling to the lower extremities and abdomen.  RESPIRATORY: Airway is open and patent, respirations are spontaneous, patient has a normal effort and rate, no accessory muscle use noted, pt placed on continuous pulse ox with O2 sats noted at 97% on room air.  CARDIAC: Pt placed on cardiac monitor. Patient has a tachy rate and regular rhythm, capillary refill < 3 seconds.   GASTRO: Soft and non tender to palpation, no distention noted, normoactive bowel sounds present in all four quadrants. Pt states bowel movements have been regular.  : Pt denies any pain or frequency with urination.  NEURO: Pt opens eyes spontaneously, behavior appropriate to situation, follows commands, facial expression symmetrical, bilateral hand grasp equal and even, purposeful motor response noted, normal sensation in all extremities when touched with a finger.

## 2018-05-27 NOTE — ED TRIAGE NOTES
Pt had liver transplant in October. Pt gets dialysis Monday and Friday. Pt complains of swelling to the lower extremities and abdomen x3 days. Pt reports 8/10 abdominal pain.    the next 5 days     LEARNING NEEDS (Diet, Supplementation, Food/Nutrient-Drug Interaction):   [x] None Identified   [] Education provided/documented      Identified and patient: [] Declined   [] Was not appropriate/indicated        NUTRITION MONITORING /EVALUATION:   Follow PO intake  Monitor wt  Monitor renal labs, electrolytes, fluid status  Monitor for additional supplement needs     Previous Recommendations Implemented: yes        Previous Goals Met:  yes       [] Participated in Interdisciplinary Rounds    [x] Interdisciplinary Care Plan Reviewed  DISCHARGE NUTRITION RECOMMENDATIONS ADDRESSED:     [x] Yes- recommended diabetic diet    [] To be determined closer to discharge    NUTRITION RISK:           [x] At risk                        [] Not currently at risk        Will follow-up per policy.   Mando Contreras, 65 [address]  PAGER:  [phone]

## 2018-05-27 NOTE — ED PROVIDER NOTES
Encounter Date: 5/27/2018    SCRIBE #1 NOTE: I, Angel Armstrong, am scribing for, and in the presence of,  Dr. Harding. I have scribed the following portions of the note -       History     Chief Complaint   Patient presents with    Abdominal Pain     liver xplant in oct, now abd swelling, last dialysis friday     Time patient was seen by the provider: 5:13 PM      The patient is a 27 y.o. male with co-morbidities including: HTN, ESRD who presents to the ED with a complaint of abdominal pain and swelling x 3 days. Pt reports worsening abdominal distension over the past several months with associated lower extremity edema. Pt was dialyzed last Friday (5/25). He denies SOB or loss of appetite, however, his abdominal distension makes breathing and eating more difficult. Pt has been compliant with his pain medications. No fevers.             Review of patient's allergies indicates:   Allergen Reactions    Bactrim [sulfamethoxazole-trimethoprim] Other (See Comments)     Mookie Trell Syndrome     Past Medical History:   Diagnosis Date    Alcoholic hepatitis with ascites     Alcoholic hepatitis with ascites     ESRD on dialysis     History of hematemesis 9/28/2017    Hypertension     Renal disorder     Seizures      Past Surgical History:   Procedure Laterality Date    APPENDECTOMY      ERCP      ESOPHAGOGASTRODUODENOSCOPY      LIVER TRANSPLANT       Family History   Problem Relation Age of Onset    No Known Problems Mother      Social History   Substance Use Topics    Smoking status: Former Smoker     Packs/day: 1.00     Years: 10.00     Types: Cigarettes     Start date: 2/19/2017    Smokeless tobacco: Never Used    Alcohol use No      Comment: a fifth of liquor daily for years, cut back over last 2 months     Review of Systems   Constitutional: Negative.  Negative for appetite change.   HENT: Negative.    Eyes: Negative.    Respiratory: Negative.  Negative for shortness of breath.    Cardiovascular:  Positive for leg swelling.   Gastrointestinal: Positive for abdominal distention and abdominal pain.   Genitourinary: Negative.    Musculoskeletal: Negative.    Skin: Negative.    Neurological: Negative.        Physical Exam     Initial Vitals [05/27/18 1610]   BP Pulse Resp Temp SpO2   (!) 149/90 (!) 116 20 98.6 °F (37 °C) 98 %      MAP       109.67         Physical Exam    Nursing note and vitals reviewed.  Constitutional: He appears well-developed. He is not diaphoretic. No distress.   Temporal wasting   HENT:   Head: Normocephalic and atraumatic.   Mouth/Throat: Oropharynx is clear and moist.   Eyes: Scleral icterus (mild) is present.   Neck: Normal range of motion. Neck supple.   Cardiovascular: Normal rate, regular rhythm and normal heart sounds.   Pulmonary/Chest: Breath sounds normal. No respiratory distress. He has no rales.   Abdominal: Soft. Bowel sounds are normal. He exhibits distension. There is tenderness (mild, diffuse).   Musculoskeletal: Normal range of motion. He exhibits edema (self pitting edema BLE).   Neurological: He is alert and oriented to person, place, and time. He has normal strength. No sensory deficit.   Skin: Skin is warm and dry. Capillary refill takes less than 2 seconds.   Psychiatric: He has a normal mood and affect.         ED Course   Procedures  Labs Reviewed   CBC W/ AUTO DIFFERENTIAL - Abnormal; Notable for the following:        Result Value    RBC 3.07 (*)     Hemoglobin 10.0 (*)     Hematocrit 30.6 (*)      (*)     MCH 32.6 (*)     RDW 18.6 (*)     Platelets 15 (*)     Immature Granulocytes 1.7 (*)     Immature Grans (Abs) 0.14 (*)     Lymph # 0.6 (*)     Gran% 81.2 (*)     Lymph% 7.2 (*)     All other components within normal limits    Narrative:     PLT critical result(s) called and verbal readback obtained from Solis Pickard RN.  , 05/27/2018 18:49   COMPREHENSIVE METABOLIC PANEL - Abnormal; Notable for the following:     CO2 22 (*)     Glucose 117 (*)     BUN,  Bld 72 (*)     Creatinine 3.3 (*)     Albumin 2.8 (*)     Total Bilirubin 1.9 (*)     eGFR if  27.8 (*)     eGFR if non  24.1 (*)     All other components within normal limits   B-TYPE NATRIURETIC PEPTIDE - Abnormal; Notable for the following:      (*)     All other components within normal limits   MAGNESIUM - Abnormal; Notable for the following:     Magnesium 2.7 (*)     All other components within normal limits   PHOSPHORUS - Abnormal; Notable for the following:     Phosphorus 5.2 (*)     All other components within normal limits   CULTURE, BLOOD   CULTURE, BLOOD   LIPASE   AMYLASE   URINALYSIS   PROTIME-INR             Medical Decision Making:   History:   Old Medical Records: I decided to obtain old medical records.  Independently Interpreted Test(s):   I have ordered and independently interpreted X-rays - see prior notes.  I have ordered and independently interpreted EKG Reading(s) - see prior notes  Clinical Tests:   Lab Tests: Ordered and Reviewed  Radiological Study: Ordered and Reviewed  Medical Tests: Ordered and Reviewed            Scribe Attestation:   Scribe #1: I performed the above scribed service and the documentation accurately describes the services I performed. I attest to the accuracy of the note.               Clinical Impression:   The primary encounter diagnosis was Liver transplanted. A diagnosis of Abdominal pain was also pertinent to this visit.                           Freedom Harding MD  05/27/18 2028

## 2018-05-28 PROBLEM — F05 ACUTE CONFUSIONAL STATE: Status: ACTIVE | Noted: 2018-05-28

## 2018-05-28 LAB
ABO + RH BLD: NORMAL
ALBUMIN SERPL BCP-MCNC: 2.3 G/DL
ALBUMIN SERPL BCP-MCNC: 2.5 G/DL
ALP SERPL-CCNC: 98 U/L
ALT SERPL W/O P-5'-P-CCNC: 10 U/L
ANION GAP SERPL CALC-SCNC: 11 MMOL/L
ANION GAP SERPL CALC-SCNC: 14 MMOL/L
ANISOCYTOSIS BLD QL SMEAR: SLIGHT
AST SERPL-CCNC: 16 U/L
BASOPHILS # BLD AUTO: 0 K/UL
BASOPHILS # BLD AUTO: 0.01 K/UL
BASOPHILS NFR BLD: 0 %
BASOPHILS NFR BLD: 0.2 %
BILIRUB SERPL-MCNC: 1.7 MG/DL
BILIRUB UR QL STRIP: NEGATIVE
BLD GP AB SCN CELLS X3 SERPL QL: NORMAL
BLD PROD TYP BPU: NORMAL
BLOOD UNIT EXPIRATION DATE: NORMAL
BLOOD UNIT TYPE CODE: 9500
BLOOD UNIT TYPE: NORMAL
BUN SERPL-MCNC: 81 MG/DL
BUN SERPL-MCNC: 89 MG/DL
CALCIUM SERPL-MCNC: 8.6 MG/DL
CALCIUM SERPL-MCNC: 9.1 MG/DL
CHLORIDE SERPL-SCNC: 104 MMOL/L
CHLORIDE SERPL-SCNC: 105 MMOL/L
CLARITY UR REFRACT.AUTO: CLEAR
CO2 SERPL-SCNC: 19 MMOL/L
CO2 SERPL-SCNC: 23 MMOL/L
CODING SYSTEM: NORMAL
COLOR UR AUTO: YELLOW
CREAT SERPL-MCNC: 3 MG/DL
CREAT SERPL-MCNC: 3.3 MG/DL
CYCLOSPORINE BLD LC/MS/MS-MCNC: 357 NG/ML
DIFFERENTIAL METHOD: ABNORMAL
DIFFERENTIAL METHOD: ABNORMAL
DISPENSE STATUS: NORMAL
EOSINOPHIL # BLD AUTO: 0 K/UL
EOSINOPHIL # BLD AUTO: 0 K/UL
EOSINOPHIL NFR BLD: 0 %
EOSINOPHIL NFR BLD: 0 %
ERYTHROCYTE [DISTWIDTH] IN BLOOD BY AUTOMATED COUNT: 17.7 %
ERYTHROCYTE [DISTWIDTH] IN BLOOD BY AUTOMATED COUNT: 18.1 %
EST. GFR  (AFRICAN AMERICAN): 27.8 ML/MIN/1.73 M^2
EST. GFR  (AFRICAN AMERICAN): 31.2 ML/MIN/1.73 M^2
EST. GFR  (NON AFRICAN AMERICAN): 24.1 ML/MIN/1.73 M^2
EST. GFR  (NON AFRICAN AMERICAN): 27 ML/MIN/1.73 M^2
GLUCOSE SERPL-MCNC: 103 MG/DL
GLUCOSE SERPL-MCNC: 128 MG/DL
GLUCOSE UR QL STRIP: NEGATIVE
HCT VFR BLD AUTO: 24.3 %
HCT VFR BLD AUTO: 25.4 %
HGB BLD-MCNC: 7.8 G/DL
HGB BLD-MCNC: 8.4 G/DL
HGB UR QL STRIP: NEGATIVE
IMM GRANULOCYTES # BLD AUTO: 0.05 K/UL
IMM GRANULOCYTES # BLD AUTO: 0.28 K/UL
IMM GRANULOCYTES NFR BLD AUTO: 1.1 %
IMM GRANULOCYTES NFR BLD AUTO: 4.3 %
INR PPP: 1
KETONES UR QL STRIP: NEGATIVE
LEUKOCYTE ESTERASE UR QL STRIP: NEGATIVE
LYMPHOCYTES # BLD AUTO: 0.5 K/UL
LYMPHOCYTES # BLD AUTO: 0.5 K/UL
LYMPHOCYTES NFR BLD: 12 %
LYMPHOCYTES NFR BLD: 8.2 %
MAGNESIUM SERPL-MCNC: 2.8 MG/DL
MCH RBC QN AUTO: 32.2 PG
MCH RBC QN AUTO: 32.5 PG
MCHC RBC AUTO-ENTMCNC: 32.1 G/DL
MCHC RBC AUTO-ENTMCNC: 33.1 G/DL
MCV RBC AUTO: 101 FL
MCV RBC AUTO: 97 FL
MONOCYTES # BLD AUTO: 0.4 K/UL
MONOCYTES # BLD AUTO: 0.5 K/UL
MONOCYTES NFR BLD: 12.2 %
MONOCYTES NFR BLD: 6.3 %
NEUTROPHILS # BLD AUTO: 3.3 K/UL
NEUTROPHILS # BLD AUTO: 5.3 K/UL
NEUTROPHILS NFR BLD: 74.7 %
NEUTROPHILS NFR BLD: 81 %
NITRITE UR QL STRIP: NEGATIVE
NRBC BLD-RTO: 0 /100 WBC
NRBC BLD-RTO: 0 /100 WBC
PH UR STRIP: 6 [PH] (ref 5–8)
PHOSPHATE SERPL-MCNC: 6 MG/DL
PHOSPHATE SERPL-MCNC: 6.1 MG/DL
PLATELET # BLD AUTO: 27 K/UL
PLATELET # BLD AUTO: 9 K/UL
PLATELET BLD QL SMEAR: ABNORMAL
PMV BLD AUTO: 13.1 FL
PMV BLD AUTO: ABNORMAL FL
POCT GLUCOSE: 106 MG/DL (ref 70–110)
POCT GLUCOSE: 121 MG/DL (ref 70–110)
POCT GLUCOSE: 176 MG/DL (ref 70–110)
POLYCHROMASIA BLD QL SMEAR: ABNORMAL
POTASSIUM SERPL-SCNC: 5.6 MMOL/L
POTASSIUM SERPL-SCNC: 5.8 MMOL/L
PROT SERPL-MCNC: 5.6 G/DL
PROT UR QL STRIP: NEGATIVE
PROTHROMBIN TIME: 10.7 SEC
RBC # BLD AUTO: 2.4 M/UL
RBC # BLD AUTO: 2.61 M/UL
SODIUM SERPL-SCNC: 138 MMOL/L
SODIUM SERPL-SCNC: 138 MMOL/L
SP GR UR STRIP: 1.01 (ref 1–1.03)
TRANS PLATPHERESIS VOL PATIENT: NORMAL ML
URN SPEC COLLECT METH UR: NORMAL
UROBILINOGEN UR STRIP-ACNC: 4 EU/DL
WBC # BLD AUTO: 4.41 K/UL
WBC # BLD AUTO: 6.63 K/UL

## 2018-05-28 PROCEDURE — 80069 RENAL FUNCTION PANEL: CPT | Mod: NTX

## 2018-05-28 PROCEDURE — 87086 URINE CULTURE/COLONY COUNT: CPT | Mod: NTX

## 2018-05-28 PROCEDURE — 84100 ASSAY OF PHOSPHORUS: CPT | Mod: NTX

## 2018-05-28 PROCEDURE — 86920 COMPATIBILITY TEST SPIN: CPT | Mod: NTX

## 2018-05-28 PROCEDURE — 63600175 PHARM REV CODE 636 W HCPCS: Mod: NTX | Performed by: STUDENT IN AN ORGANIZED HEALTH CARE EDUCATION/TRAINING PROGRAM

## 2018-05-28 PROCEDURE — 25000003 PHARM REV CODE 250: Mod: NTX | Performed by: STUDENT IN AN ORGANIZED HEALTH CARE EDUCATION/TRAINING PROGRAM

## 2018-05-28 PROCEDURE — 25500020 PHARM REV CODE 255: Mod: NTX | Performed by: STUDENT IN AN ORGANIZED HEALTH CARE EDUCATION/TRAINING PROGRAM

## 2018-05-28 PROCEDURE — 20600001 HC STEP DOWN PRIVATE ROOM: Mod: NTX

## 2018-05-28 PROCEDURE — 25000003 PHARM REV CODE 250: Mod: NTX | Performed by: NURSE PRACTITIONER

## 2018-05-28 PROCEDURE — 86901 BLOOD TYPING SEROLOGIC RH(D): CPT | Mod: NTX

## 2018-05-28 PROCEDURE — 63600175 PHARM REV CODE 636 W HCPCS: Mod: NTX | Performed by: NURSE PRACTITIONER

## 2018-05-28 PROCEDURE — 81003 URINALYSIS AUTO W/O SCOPE: CPT | Mod: NTX

## 2018-05-28 PROCEDURE — 85610 PROTHROMBIN TIME: CPT | Mod: NTX

## 2018-05-28 PROCEDURE — 80053 COMPREHEN METABOLIC PANEL: CPT | Mod: NTX

## 2018-05-28 PROCEDURE — 99255 IP/OBS CONSLTJ NEW/EST HI 80: CPT | Mod: NTX,,, | Performed by: INTERNAL MEDICINE

## 2018-05-28 PROCEDURE — 85025 COMPLETE CBC W/AUTO DIFF WBC: CPT | Mod: NTX

## 2018-05-28 PROCEDURE — P9035 PLATELET PHERES LEUKOREDUCED: HCPCS | Mod: NTX

## 2018-05-28 PROCEDURE — 87040 BLOOD CULTURE FOR BACTERIA: CPT | Mod: 59,NTX

## 2018-05-28 PROCEDURE — 36415 COLL VENOUS BLD VENIPUNCTURE: CPT | Mod: NTX

## 2018-05-28 PROCEDURE — 25000003 PHARM REV CODE 250: Mod: NTX | Performed by: PHYSICIAN ASSISTANT

## 2018-05-28 PROCEDURE — 80158 DRUG ASSAY CYCLOSPORINE: CPT | Mod: NTX

## 2018-05-28 PROCEDURE — 90935 HEMODIALYSIS ONE EVALUATION: CPT | Mod: NTX

## 2018-05-28 PROCEDURE — 99233 SBSQ HOSP IP/OBS HIGH 50: CPT | Mod: SA,NTX,, | Performed by: PHYSICIAN ASSISTANT

## 2018-05-28 PROCEDURE — 83735 ASSAY OF MAGNESIUM: CPT | Mod: NTX

## 2018-05-28 RX ORDER — SODIUM CHLORIDE 9 MG/ML
INJECTION, SOLUTION INTRAVENOUS
Status: DISCONTINUED | OUTPATIENT
Start: 2018-05-28 | End: 2018-05-30

## 2018-05-28 RX ORDER — PSEUDOEPHEDRINE/ACETAMINOPHEN 30MG-500MG
100 TABLET ORAL ONCE
Status: DISCONTINUED | OUTPATIENT
Start: 2018-05-28 | End: 2018-05-28

## 2018-05-28 RX ORDER — INSULIN ASPART 100 [IU]/ML
0-5 INJECTION, SOLUTION INTRAVENOUS; SUBCUTANEOUS EVERY 6 HOURS PRN
Status: DISCONTINUED | OUTPATIENT
Start: 2018-05-28 | End: 2018-05-28

## 2018-05-28 RX ORDER — SODIUM CHLORIDE 9 MG/ML
INJECTION, SOLUTION INTRAVENOUS ONCE
Status: DISCONTINUED | OUTPATIENT
Start: 2018-05-28 | End: 2018-05-30

## 2018-05-28 RX ORDER — FLUCONAZOLE 200 MG/1
200 TABLET ORAL DAILY
Status: DISCONTINUED | OUTPATIENT
Start: 2018-05-29 | End: 2018-06-04 | Stop reason: HOSPADM

## 2018-05-28 RX ORDER — SEVELAMER CARBONATE 800 MG/1
1600 TABLET, FILM COATED ORAL
Status: DISCONTINUED | OUTPATIENT
Start: 2018-05-28 | End: 2018-06-04 | Stop reason: HOSPADM

## 2018-05-28 RX ORDER — SODIUM BICARBONATE 1 MEQ/ML
50 SYRINGE (ML) INTRAVENOUS ONCE
Status: COMPLETED | OUTPATIENT
Start: 2018-05-28 | End: 2018-05-28

## 2018-05-28 RX ORDER — GLUCAGON 1 MG
1 KIT INJECTION
Status: DISCONTINUED | OUTPATIENT
Start: 2018-05-28 | End: 2018-06-04 | Stop reason: HOSPADM

## 2018-05-28 RX ORDER — SYRING-NEEDL,DISP,INSUL,0.3 ML 29 G X1/2"
296 SYRINGE, EMPTY DISPOSABLE MISCELLANEOUS ONCE
Status: DISCONTINUED | OUTPATIENT
Start: 2018-05-28 | End: 2018-05-28

## 2018-05-28 RX ORDER — PSEUDOEPHEDRINE/ACETAMINOPHEN 30MG-500MG
100 TABLET ORAL ONCE
Status: COMPLETED | OUTPATIENT
Start: 2018-05-28 | End: 2018-05-28

## 2018-05-28 RX ORDER — HYDROCODONE BITARTRATE AND ACETAMINOPHEN 500; 5 MG/1; MG/1
TABLET ORAL
Status: DISCONTINUED | OUTPATIENT
Start: 2018-05-28 | End: 2018-05-30

## 2018-05-28 RX ORDER — GENTAMICIN SULFATE 40 MG/ML
80 INJECTION, SOLUTION INTRAMUSCULAR; INTRAVENOUS
Status: DISCONTINUED | OUTPATIENT
Start: 2018-05-28 | End: 2018-06-04 | Stop reason: HOSPADM

## 2018-05-28 RX ORDER — BISACODYL 10 MG
10 SUPPOSITORY, RECTAL RECTAL ONCE
Status: COMPLETED | OUTPATIENT
Start: 2018-05-28 | End: 2018-05-28

## 2018-05-28 RX ORDER — SYRING-NEEDL,DISP,INSUL,0.3 ML 29 G X1/2"
296 SYRINGE, EMPTY DISPOSABLE MISCELLANEOUS ONCE
Status: COMPLETED | OUTPATIENT
Start: 2018-05-28 | End: 2018-05-28

## 2018-05-28 RX ORDER — TORSEMIDE 20 MG/1
60 TABLET ORAL ONCE
Status: COMPLETED | OUTPATIENT
Start: 2018-05-28 | End: 2018-05-28

## 2018-05-28 RX ADMIN — IOHEXOL 15 ML: 350 INJECTION, SOLUTION INTRAVENOUS at 03:05

## 2018-05-28 RX ADMIN — MAGESIUM CITRATE 296 ML: 1.75 LIQUID ORAL at 06:05

## 2018-05-28 RX ADMIN — BISACODYL 10 MG: 10 SUPPOSITORY RECTAL at 10:05

## 2018-05-28 RX ADMIN — CYCLOSPORINE 125 MG: 100 CAPSULE, LIQUID FILLED ORAL at 08:05

## 2018-05-28 RX ADMIN — LACTULOSE 10 G: 20 SOLUTION ORAL at 08:05

## 2018-05-28 RX ADMIN — CALCIUM GLUCONATE 1 G: 98 INJECTION, SOLUTION INTRAVENOUS at 11:05

## 2018-05-28 RX ADMIN — DRONABINOL 2.5 MG: 2.5 CAPSULE ORAL at 08:05

## 2018-05-28 RX ADMIN — OXYCODONE HYDROCHLORIDE 5 MG: 5 TABLET ORAL at 03:05

## 2018-05-28 RX ADMIN — SODIUM POLYSTYRENE SULFONATE 30 G: 15 SUSPENSION ORAL; RECTAL at 09:05

## 2018-05-28 RX ADMIN — LEVETIRACETAM 500 MG: 500 TABLET ORAL at 08:05

## 2018-05-28 RX ADMIN — DOCUSATE SODIUM 100 MG: 100 CAPSULE, LIQUID FILLED ORAL at 08:05

## 2018-05-28 RX ADMIN — URSODIOL 300 MG: 300 CAPSULE ORAL at 08:05

## 2018-05-28 RX ADMIN — AMLODIPINE BESYLATE 5 MG: 5 TABLET ORAL at 08:05

## 2018-05-28 RX ADMIN — GABAPENTIN 100 MG: 100 CAPSULE ORAL at 08:05

## 2018-05-28 RX ADMIN — Medication 100 ML: at 06:05

## 2018-05-28 RX ADMIN — PREDNISONE 60 MG: 10 TABLET ORAL at 08:05

## 2018-05-28 RX ADMIN — GENTAMICIN SULFATE 80 MG: 40 INJECTION, SOLUTION INTRAMUSCULAR; INTRAVENOUS at 02:05

## 2018-05-28 RX ADMIN — SODIUM CHLORIDE 500 ML: 0.9 INJECTION, SOLUTION INTRAVENOUS at 03:05

## 2018-05-28 RX ADMIN — OXYCODONE HYDROCHLORIDE 5 MG: 5 TABLET ORAL at 09:05

## 2018-05-28 RX ADMIN — FAMOTIDINE 20 MG: 20 TABLET ORAL at 08:05

## 2018-05-28 RX ADMIN — DEXTROSE MONOHYDRATE 25 G: 25 INJECTION, SOLUTION INTRAVENOUS at 09:05

## 2018-05-28 RX ADMIN — BISACODYL 10 MG: 5 TABLET, COATED ORAL at 08:05

## 2018-05-28 RX ADMIN — SODIUM BICARBONATE 50 MEQ: 84 INJECTION INTRAVENOUS at 09:05

## 2018-05-28 RX ADMIN — INSULIN HUMAN 10 UNITS: 100 INJECTION, SOLUTION PARENTERAL at 09:05

## 2018-05-28 RX ADMIN — LEVOTHYROXINE SODIUM 75 MCG: 75 TABLET ORAL at 05:05

## 2018-05-28 RX ADMIN — TORSEMIDE 60 MG: 20 TABLET ORAL at 11:05

## 2018-05-28 NOTE — SUBJECTIVE & OBJECTIVE
Past Medical History:   Diagnosis Date    Alcoholic hepatitis with ascites     Alcoholic hepatitis with ascites     ESRD on dialysis     History of hematemesis 9/28/2017    Hypertension     Renal disorder     Seizures        Past Surgical History:   Procedure Laterality Date    APPENDECTOMY      ERCP      ESOPHAGOGASTRODUODENOSCOPY      LIVER TRANSPLANT         Review of patient's allergies indicates:   Allergen Reactions    Bactrim [sulfamethoxazole-trimethoprim] Other (See Comments)     Mookie Trell Syndrome     Current Facility-Administered Medications   Medication Frequency    acetaminophen tablet 650 mg Q4H PRN    amLODIPine tablet 5 mg Daily    bisacodyl EC tablet 10 mg Daily    cycloSPORINE modified (NEORAL) capsule 125 mg BID    dextrose 50% injection 12.5 g PRN    diphenhydrAMINE injection 25 mg Q4H PRN    docusate sodium capsule 100 mg BID    dronabinol capsule 2.5 mg QHS    famotidine tablet 20 mg QHS    gabapentin capsule 100 mg QHS    glucagon (human recombinant) injection 1 mg PRN    lactulose 20 gram/30 mL solution Soln 10 g TID    levETIRAcetam tablet 500 mg BID    levothyroxine tablet 75 mcg Before breakfast    ondansetron disintegrating tablet 8 mg Q8H PRN    oxyCODONE immediate release tablet 5 mg Q4H PRN    predniSONE tablet 60 mg Daily    promethazine (PHENERGAN) 12.5 mg in dextrose 5 % 50 mL IVPB Q6H PRN    ramelteon tablet 8 mg Nightly PRN    sevelamer carbonate tablet 800 mg TID WM    sodium chloride 0.9% flush 3 mL PRN    ursodiol capsule 300 mg BID     Family History     Problem Relation (Age of Onset)    No Known Problems Mother        Social History Main Topics    Smoking status: Former Smoker     Packs/day: 1.00     Years: 10.00     Types: Cigarettes     Start date: 2/19/2017    Smokeless tobacco: Never Used    Alcohol use No      Comment: a fifth of liquor daily for years, cut back over last 2 months    Drug use: No    Sexual activity: Not on  file     Review of Systems   Constitutional: Positive for fatigue. Negative for appetite change and chills.   HENT: Negative.    Eyes: Negative.    Cardiovascular: Positive for leg swelling. Negative for palpitations.   Gastrointestinal: Positive for abdominal distention, abdominal pain and constipation. Negative for nausea and vomiting.   Genitourinary: Negative.    Musculoskeletal: Positive for arthralgias.   Skin: Negative.    Neurological: Negative.    Psychiatric/Behavioral: Negative.      Objective:     Vital Signs (Most Recent):  Temp: 98.3 °F (36.8 °C) (05/28/18 0858)  Pulse: 95 (05/28/18 1047)  Resp: 14 (05/28/18 0858)  BP: 132/85 (05/28/18 0858)  SpO2: 97 % (05/28/18 0858)  O2 Device (Oxygen Therapy): room air (05/28/18 0350) Vital Signs (24h Range):  Temp:  [97.7 °F (36.5 °C)-98.6 °F (37 °C)] 98.3 °F (36.8 °C)  Pulse:  [] 95  Resp:  [12-20] 14  SpO2:  [97 %-100 %] 97 %  BP: (131-149)/(85-97) 132/85     Weight: 65.2 kg (143 lb 11.8 oz) (05/27/18 2330)  Body mass index is 23.2 kg/m².  Body surface area is 1.74 meters squared.    No intake/output data recorded.    Physical Exam   Constitutional: He is oriented to person, place, and time.   Chronic ill appearing    HENT:   Head: Atraumatic.   Eyes: EOM are normal.   Neck: Neck supple.   Cardiovascular: Normal rate and regular rhythm.    Pulmonary/Chest: Effort normal and breath sounds normal.   Diminished at bases.    Abdominal: Soft. Bowel sounds are normal. He exhibits distension. There is tenderness.   Musculoskeletal: He exhibits edema (BLE +2-3).   Neurological: He is oriented to person, place, and time.   Skin: Skin is warm and dry.   R IJ permacath   Psychiatric: He has a normal mood and affect.       Significant Labs:  All labs within the past 24 hours have been reviewed.    Significant Imaging:  Labs: Reviewed

## 2018-05-28 NOTE — PROGRESS NOTES
Admit Note     Met with patient and mother to assess needs. Patient is a 27 y.o. single male, admitted for post liver transplantation on 10/19/17; admit with abdominal pain, no bowel movement in four days now.      Patient admitted from ed on 5/27/2018 .  At this time, patient presents as alert and oriented x 4, good eye contact, calm, communicative, cooperative, asking and answering questions appropriately and quiet today, patient pale as well.  At this time, patients caregiver presents as alert and oriented x 4, pleasant, good eye contact, well groomed, recall good, concentration/judgement good, average intelligence, calm, communicative, cooperative and asking and answering questions appropriately.    Household/Family Systems     Patient resides with patient's mother, at [address]  [place] [address].  Support system includes mother Sanjuanita, sister Miranda and aunt Sophy.  Patient does not have dependents that are need of being cared for.     Patients primary caregiver is Sanjuanita Diana, patients mother, phone number [phone].  Confirmed patients contact information is [phone] (home);   Telephone Information:   Mobile [phone]   .    During admission, patient's caregiver plans to stay in patient's room.  Confirmed patient and patients caregivers do have access to reliable transportation.    Cognitive Status/Learning     Patient reports reading ability as college and states patient does have difficulty with comprehension and concentration due to pain at times.  Patient reports patient learns best by written and verbal information and demonstration.   Needed: No.   Highest education level: Attended College/Technical School    Vocation/Disability   .  Working for Income: No  If no, reason not working: Demands of Treatment  Patient is now on LTD from his employer [employer].  Pt has not worked since last Sept 2017 when he became ill.  Pt used FMLA and STD initially.   Chip continues to pay patient's insurance premiums.    Adherence     Patient reports a high level of adherence to patients health care regimen.  Adherence counseling and education provided. Patient verbalizes understanding.    Substance Use    Patient reports the following substance usage.    Tobacco: none, patient denies any use.  Alcohol: Patient ETOH use 2017, patient stopped with liver disease diagnosis.  Pt drank heavily before liver disease diagnosis.  Pt needs to enroll in [facility], but has not been able to due so because of complications post liver transplant.  Pt in agreement to rehab when he is able to stay at the hospital.  Illicit Drugs/Non-prescribed Medications: none, patient denies any use.  Patient states clear understanding of the potential impact of substance use.  Substance abstinence/cessation counseling, education and resources provided and reviewed.     Services Utilizing/ADLS    Infusion Service: Prior to admission, patient utilizing? no  Home Health: Prior to admission, patient utilizing? no, used [facility] in past  DME: Prior to admission, yes , using RW and has a cane  Pulmonary/Cardiac Rehab: Prior to admission, no  Dialysis:  Prior to admission, yes , [facility] ([phone])  3:30  Transplant Specialty Pharmacy:  Prior to admission, yes; [facility].    Prior to admission, patient reports patient was independent with ADLS and was not driving.  Patient reports patient is not able to care for self at this time due to compromised medical condition (as documented in medical record) and physical weakness..  Patient indicates a willingness to care for self once medically cleared to do so.    Insurance/Medications    Insured by   Payor/Plan Subscr  Sex Relation Sub. Ins. ID Effective Group Num   1. [facility]* RAFFY THOMAS 1990 Male  [id number]                                   [address] [address]   2. [facility]* RAFFY THOMAS 1990 Male  [id number]                                    [address]      Primary Insurance (for UNOS reporting): Private Insurance  Secondary Insurance (for UNOS reporting): None    Patient reports patient is able to obtain and afford medications at this time and at time of discharge.    Living Will/Healthcare Power of     Patient states patient has a LW and/or HCPA.   provided education regarding LW and HCPA and the completion of forms.    Coping/Mental Health    Patient is coping well with the aid of  family members. Patient denies any symptoms of depression or anxiety, patient verbalized he is doing as well as can be expected.  Patient denies mental health difficulties.     Discharge Planning    At time of discharge, patient plans to return to [facility] under the care of mom Sanjuanita.  Patients mother will transport patient.  Per rounds today, expected discharge date has not been medically determined at this time. Patient and patients caregiver  verbalize understanding and are involved in treatment planning and discharge process.    Additional Concerns    Patient's caretaker denies additional needs and/or concerns at this time. Patient is being followed for needs, education, resources, information, emotional support, supportive counseling, and for supportive and skilled discharge plan of care.  providing ongoing psychosocial support, education, resources and d/c planning as needed.  SW remains available. Patient's caregiver verbalizes understanding and agreement with information reviewed,  availability and how to access available resources as needed. Patient denies additional needs and/or concerns at this time. Patient verbalizes understanding and agreement with information reviewed, social work availability, and how to access available resources as needed.

## 2018-05-28 NOTE — PLAN OF CARE
Problem: Patient Care Overview  Goal: Plan of Care Review  Pt AAOx4, bed low locked, call light within reach, wearing nonskid socks. Pt instructed to use call light for assistance, pt verbalizes understanding. Pt c/o ABD pain, prn pain meds x 2, no complaints at this time. Tele NSR 70's - 80's. NS @ 50 mL/hr. Pt aware he is NPO this RN caught pt eating cookies this AM. Reminded again, nothing to eat or drink. Pt remains free from injury/harm at this time. Afebrile. See flowsheet for full assessment/VS.

## 2018-05-28 NOTE — PROGRESS NOTES
"Patient returned to the unit from dialysis, patient was removed after 45 minutes to use the restroom then refused to get re connected.  Patient was tearful and seemed "off" when he returned to the unit. Transplant team aware, Fransisca ABARCA notified.  Patients vitals stable.  Patient is currently receiving platelets.  Drinking contrast for CT that will happen around 5 pm today.  BC drawn in dialysis, awaiting urine for urine culture.  Mother at bedside, will continue to monitor.  "

## 2018-05-28 NOTE — PROGRESS NOTES
HD stopped after 45 minutes 0.2L of fluid removed pt tolerated well. Both lumens of a R chestwall permcath instilled with gentamycin and NS to fill 1.8cc lumens, capped and taped. Central line dressing changed per protocol. Report given to ORQUIDEA Reynaga RN.

## 2018-05-28 NOTE — HPI
28 y/o  male with significant history for ETOH cirrhosis sp  S/p DDLT 10/19/2017 complicated by  seizures (swtiched off prograf to cyclo)/superficial wound infection now resolved, ATN dialysis dependant in iHD MWF, multiple admissions for fevers finally dx with peritonitis in November/neg for bile leak, and recent admission 5/8-5/23 for fever, elevated bili, and anemia at which time seen by AES recommended no ERCP as imagining showed stent in place and Hematology recommended steroids for possible ITP/loweing dose of cyclosporine for possible TMA now readmitted for acute on chronic mid abdominal pain and constipation. Denies nausea or vomiting. Mother reports good appetite.  ml last Thursday but have not recorded since. KUB showed no obstruction, large stool through out colon, and common duct stent unchanged and pnumobilia present.  Nephrology consulted for hemodialysis. LAURA dialysis dependant on HD twice a week Mon and Fri via [facility] at [facility] under the care of Dr. Peterson. Last HD Friday 5/25.

## 2018-05-28 NOTE — HPI
Mr. 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  S/p OLTX 10/19/2017 (Liver) with steroid induction. Post op course with multiple complications to include biliary stricture, FUO, ESRD, and chronic abdominal pain. Recently discharged on 5/23 after workup for abdominal pain and thrombocytopenia. He presented 5/27 with worsening abdominal pain since Friday and distention. Denies nausea, vomiting, sob, cp, fevers, or any other new or concerning symptoms. Reports no BM x3 days.

## 2018-05-28 NOTE — PROGRESS NOTES
Patient transported down to dialysis, per wheelchair, vitals stable.  Kimberly NO MD assessed patient prior to leaving the unit.  Patient will have plt and CT when patient returns.  Will continue to monitor.

## 2018-05-28 NOTE — PLAN OF CARE
"Problem: Patient Care Overview  Goal: Plan of Care Review  Outcome: Ongoing (interventions implemented as appropriate)  Patient is awake and can answer questions appropriately today but is "off".  Flapping hands and has awkward mouth movements.  Patients vitals are stable.  Admitted with abd pain, no BM in 4 days, lactulose given this am, patient states that he has had a bm and says "I didn't flush, go look", but there is no BM noted, suppository also given this am. Patient received dialysis today for 45 minutes but after being disconnected to go to the bathroom the patient refused to be reconnected.  Will attempt dialysis again tomorrow, with draw CBC and renal panel to assure labs are safe to wait till am for dialysis.  Plt 9000 today, patient receiving platelets now.  CT of head, chest and abd ordered for this evening currently drinking oral contrast and NPO.  BC and UC sent.  Patient is being given oral pain medication but will not be given any IV pain medication due to constipation and confusion.  Patient is a one person assist with walker, due to generalized weakness.  When patient returns from CT, brown bomb enema will be given.  Patients mother at the bedside attentive to the patient, if mother leaves patient will be placed on bed alarm.      "

## 2018-05-28 NOTE — HOSPITAL COURSE
Hospital Course: Patient with persistent abdominal pain and no BM despite lactulose and suppository. Unable to tolerate HD d/t agitation and abdominal discomfort- requested to be taken off. Potassium elevated since HD not completed. Shifted with insulin/dextrose, calcium gluc, bicarb, and diuretics.  Potassium improved following. Patient tearful and overwhelmed. Still with some abnormal mouth and hand twitching, odd comments, and confusion. Able to toelrate HD on 5/29 with reassurance and assistance from his mom. Mental status much improved following HD- pt less sluggish, quicker to respond, and responding appropriately. Psych consult per pt request for anxiety and depression. Risperdal started 5/30/18.     Interval history: No acute events overnight, patient reports minimal sleep. Mental status remains with improvement.  Txp team with psych team 5/30, started Risperdal. Tolerating diet well. Requested enema x2 5/30, both resulting in small BM. KUB 5/31 still with significant amounts of stool. Miralax started with minimal result. Golytely ordered with 2 bowel movements.  Patient reports uncontrollable pain again over night.  Narcotics are likely patient's source of severe constipation and pain which was again explained to patient.  Will increase neurontin and plan for paracentesis in am to look for infectious cause of pain.  Transfuse platelets tonight in preparation for paracentesis in am.  Pt amenable to this.  LFTs remains stable. Continuing to hold cyclosporine, started Rapa at 1 mg as plt severely low to start. Hematology following.

## 2018-05-28 NOTE — PROGRESS NOTES
Report received from ORQUIDEA Reynaga RN. Pt arrived from floor AAOx4. Maintenance dialysis initiated via R chestHumble permcath without difficulty.

## 2018-05-28 NOTE — PROGRESS NOTES
Taken off dialysis to go to bathroom. Refuses to use bedpan. Refused assist in bathroom. Call bell w/in easy reach.

## 2018-05-28 NOTE — CONSULTS
[facility]  Hematology/Oncology  Consult Note    Patient Name: Jhonny Diana  MRN: [id number]  Admission Date: 5/27/2018  Hospital Length of Stay: 1 days  Code Status: Full Code   Attending Provider: Nathanael Medina MD  Consulting Provider: Mike De La Torre MD  Primary Care Physician: Primary Doctor No  Principal Problem:Abdominal pain    Inpatient consult to Hematology  Consult performed by: MIKE DE LA TORRE  Consult ordered by: KEATON NIÑO        Subjective:     HPI: 26 y/o M with PMHx of ETOH cirrhosis s/p OLTx in October of 2017 complicated by numerous hospitalization and complications including biliary stricture and chronic abdominal pain, HTN, and ESRD on HD Monday/Friday presents with bloating and abdominal pain with distention. Large amount of stool throughout the colon. Recent admission 5/8-5/23 for fever, elevated bili, and anemia with thrombocytopenia. Differential included TMA from cyclosporine +- ITP. 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 No reports of bleeding. Patient has been very fidgety and acting unusual so in addition to at of a/p , a head CT has been ordered. He tolerated his dialysis very poorly today (45 min then wanted it stopped).     Oncology Treatment Plan:   [No treatment plan]    Medications:  Continuous Infusions:  Scheduled Meds:   sodium chloride 0.9%   Intravenous Once    sodium chloride 0.9%   Intravenous Once    amLODIPine  5 mg Oral Daily    bisacodyl  10 mg Oral Daily    docusate sodium  100 mg Oral BID    dronabinol  2.5 mg Oral QHS    famotidine  20 mg Oral QHS    [START ON 5/29/2018] fluconazole  200 mg Oral Daily    gabapentin  100 mg Oral QHS    glycerin 99.5%  100 mL Rectal Once    And    magnesium citrate  296 mL Rectal Once     levETIRAcetam  500 mg Oral BID    levothyroxine  75 mcg Oral Before breakfast    predniSONE  60 mg Oral Daily    sevelamer carbonate  1,600 mg Oral TID WM    ursodiol  300 mg Oral BID     PRN Meds:sodium chloride, sodium chloride 0.9%, sodium chloride 0.9%, acetaminophen, dextrose 50%, diphenhydrAMINE, gentamicin, glucagon (human recombinant), omnipaque, ondansetron, oxyCODONE, ramelteon, sodium chloride 0.9%     Review of patient's allergies indicates:   Allergen Reactions    Bactrim [sulfamethoxazole-trimethoprim] Other (See Comments)     Mookie Trell Syndrome        Past Medical History:   Diagnosis Date    Alcoholic hepatitis with ascites     Alcoholic hepatitis with ascites     ESRD on dialysis     History of hematemesis 9/28/2017    Hypertension     Renal disorder     Seizures      Past Surgical History:   Procedure Laterality Date    APPENDECTOMY      ERCP      ESOPHAGOGASTRODUODENOSCOPY      LIVER TRANSPLANT       Family History     Problem Relation (Age of Onset)    No Known Problems Mother        Social History Main Topics    Smoking status: Former Smoker     Packs/day: 1.00     Years: 10.00     Types: Cigarettes     Start date: 2/19/2017    Smokeless tobacco: Never Used    Alcohol use No      Comment: a fifth of liquor daily for years, cut back over last 2 months    Drug use: No    Sexual activity: Not on file       Review of Systems   Constitutional: Positive for fatigue. Negative for chills.   HENT: Negative for congestion.    Respiratory: Negative for cough and shortness of breath.    Cardiovascular: Positive for leg swelling. Negative for chest pain.   Gastrointestinal: Positive for abdominal distention and abdominal pain.   Genitourinary: Negative for dysuria and hematuria.   Musculoskeletal: Negative for neck pain.   Skin: Negative for color change and pallor.   Neurological: Negative for syncope and speech difficulty.   Hematological: Negative for adenopathy. Does not  bruise/bleed easily.     Objective:     Vital Signs (Most Recent):  Temp: 97.6 °F (36.4 °C) (05/28/18 1657)  Pulse: 97 (05/28/18 1657)  Resp: 16 (05/28/18 1657)  BP: (!) 137/91 (05/28/18 1657)  SpO2: 97 % (05/28/18 1657) Vital Signs (24h Range):  Temp:  [97.4 °F (36.3 °C)-98.3 °F (36.8 °C)] 97.6 °F (36.4 °C)  Pulse:  [] 97  Resp:  [12-18] 16  SpO2:  [97 %-100 %] 97 %  BP: (125-150)/(79-97) 137/91     Weight: 65.2 kg (143 lb 11.8 oz)  Body mass index is 23.2 kg/m².  Body surface area is 1.74 meters squared.      Intake/Output Summary (Last 24 hours) at 05/28/18 1737  Last data filed at 05/28/18 1600   Gross per 24 hour   Intake              825 ml   Output              578 ml   Net              247 ml       Physical Exam   Constitutional: He appears well-developed and well-nourished.   HENT:   Mouth/Throat: Oropharynx is clear and moist. No oropharyngeal exudate.   Eyes: Pupils are equal, round, and reactive to light.   Cardiovascular: Normal rate and regular rhythm.    Pulmonary/Chest: Effort normal and breath sounds normal.   Abdominal: He exhibits distension.   Musculoskeletal: He exhibits edema.   Lymphadenopathy:     He has no cervical adenopathy.   Neurological: He is alert.   Skin: Skin is warm and dry.       Significant Labs:   CBC:   Recent Labs  Lab 05/27/18 1717 05/28/18  0512   WBC 8.32 4.41   HGB 10.0* 7.8*   HCT 30.6* 24.3*   PLT 15* 9*   , CMP:   Recent Labs  Lab 05/27/18 1717 05/28/18  0511    138   K 5.0 5.6*    104   CO2 22* 23   * 103   BUN 72* 89*   CREATININE 3.3* 3.3*   CALCIUM 9.2 8.6*   PROT 6.9 5.6*   ALBUMIN 2.8* 2.3*   BILITOT 1.9* 1.7*   ALKPHOS 118 98   AST 21 16   ALT 14 10   ANIONGAP 15 11   EGFRNONAA 24.1* 24.1*    and Coagulation:   Recent Labs  Lab 05/27/18  1717 05/28/18  0512   INR 1.0 1.0       Diagnostic Results:  I have reviewed all pertinent imaging results/findings within the past 24 hours.    Assessment/Plan:     Active Diagnoses:    Diagnosis Date  Noted POA    PRINCIPAL PROBLEM:  Abdominal pain [R10.9] 05/27/2018 Yes    Acute confusional state [F05] 05/28/2018 Unknown    Thrombocytopenia [D69.6] 05/12/2018 Yes    Anemia of chronic renal failure [N18.9, D63.1]  Yes    Constipation [K59.00] 01/20/2018 Yes    ESRD Monday/Friday [N18.6] 01/13/2018 Yes    Severe protein-calorie malnutrition [E43] 01/11/2018 Yes    At risk for opportunistic infections [Z91.89] 10/26/2017 Yes    Liver transplanted [Z94.4] 10/20/2017 Not Applicable     Chronic    Long-term use of immunosuppressant medication [Z79.899] 10/20/2017 Not Applicable     Chronic    Prophylactic immunotherapy [Z29.8] 10/20/2017 Not Applicable     Chronic      Problems Resolved During this Admission:    Diagnosis Date Noted Date Resolved POA       A/P  Thrombocytopenia likely multifactorial. Reviewed smear and no significant amount of schistocytes noted, Hyperchromic rbc and some spherocytes noted. No plt clumping. Suspect splenic sequestration contributing to thrombocytopenia. Also possible cyclosporine contributing to TMA picture. Would reduce dose of cyclosporine as more elevated levels than during prior hospitalization. Rule out sepsis. May continue steroids and gradually taper for possible ITP. Transfuse prn plt <10K. Will discuss further.         Thank you for your consult.     Isis Cavazos MD  Hematology/Oncology  [facility]

## 2018-05-28 NOTE — ASSESSMENT & PLAN NOTE
- pentamidine for pcp prophylaxis (last treatment 5/21)  - valcyte on hold as neutropenic, continue surveillance CMV PCR ( last checked 5/22, undetected).  - fluconazole for antigungal

## 2018-05-28 NOTE — ASSESSMENT & PLAN NOTE
- admit with abdominal pain  - denies N/V  - xray with significant stool, no obstruction noted  - LFTs stable  - will obtain CT A/P  - lactulose and suppository given without response. Plan for brown bomb tonight.

## 2018-05-28 NOTE — ASSESSMENT & PLAN NOTE
- Pt on cyclo and steroids. Continue to monitor for therapeutic levels and toxicities. Adjust doses accordingly.  - will hold cyclo for mental status changes

## 2018-05-28 NOTE — PROGRESS NOTES
Returned with assist from bathroom. States had moderate BM, flushed toilet before was able to see. Refuses to get back on dialysis. SIENNA Guillen NP here and aware of above.

## 2018-05-28 NOTE — ASSESSMENT & PLAN NOTE
LAURA dialysis dependant on HD since 10/16/17 twice a week Mon and Fri 3.5 hrs duration  via [facility] at [facility] under the care of Dr. Peterson. Last HD Friday 5/25. Last recorded  ml last Thursday but feels decrease UOP since.   -Start torsemide 60 mg daily.   -HD today for metabolic clearance and volume management. Dialysate bath adjusted to current labs.     Anemia of CKD  -Hgb 10>7.8?  -Not sure if started on Micerna outpatient as he has been in hospital. Will review outpatient flow sheet.     BMD  Lab Results   Component Value Date    PTH 21.0 05/10/2018    CALCIUM 8.6 (L) 05/28/2018    CAION 1.07 03/07/2018    PHOS 6.1 (H) 05/28/2018   -Continue Renvela. Renal diet.

## 2018-05-28 NOTE — PROGRESS NOTES
"[facility]  Liver Transplant  Progress Note    Patient Name: Jhonny Diana  MRN: [id number]  Admission Date: 2018  Hospital Length of Stay: 1 days  Code Status: Full Code  Primary Care Provider: Primary Doctor No  Post-Operative Day: 221    ORGAN:   LIVER  Disease Etiology: Acute Alcoholic Hepatitis  Donor Type:    - Brain Death  CDC High Risk:   No  Donor CMV Status:   Donor CMV Status: Positive  Donor HBcAB:   Negative  Donor HCV Status:   Negative  Whole or Partial: Whole Liver  Biliary Anastomosis: End to End  Arterial Anatomy: Standard  Subjective:     History of Present Illness:  Mr. 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  S/p OLTX 10/19/2017 (Liver) with steroid induction. Post op course with multiple complications to include biliary stricture, FUO, ESRD, and chronic abdominal pain. Recently discharged on  after workup for abdominal pain and thrombocytopenia. He presented  with worsening abdominal pain since Friday and distention. Denies nausea, vomiting, sob, cp, fevers, or any other new or concerning symptoms. Reports no BM x3 days.        Hospital Course:  Interval history: No acute events overnight. Patient without BM even following lactulose and suppository. Will plan for Brown Bomb enema tonight. Remains with persistent abdominal pain. No N/V noted. NPO for CT A/P with PO contrast. Of note, patients mom endorses some cognitive slowing and tremors. She reports patient slightly confused and "off". Patient reports multiple BMs today, however when toilet checked, no BM noted. Patient went to HD today and requested to be unhooked after 45 minutes. Pt tearful and overwhelmed. Will obtain CT head, Bcx, Ucx, UA. Will hold cyclosporine for now. Will plan to repeat labs this evening. Nephrology to assist in management of HD, pt will likely need HD again tomorrow. PLTs low, plan for 1 pack PLTs. Hematology consulted.     Scheduled Meds:   sodium chloride " 0.9%   Intravenous Once    sodium chloride 0.9%   Intravenous Once    amLODIPine  5 mg Oral Daily    bisacodyl  10 mg Oral Daily    docusate sodium  100 mg Oral BID    dronabinol  2.5 mg Oral QHS    famotidine  20 mg Oral QHS    gabapentin  100 mg Oral QHS    glycerin 99.5%  100 mL Rectal Once    And    magnesium citrate  296 mL Rectal Once    levETIRAcetam  500 mg Oral BID    levothyroxine  75 mcg Oral Before breakfast    predniSONE  60 mg Oral Daily    sevelamer carbonate  1,600 mg Oral TID WM    ursodiol  300 mg Oral BID     Continuous Infusions:  PRN Meds:sodium chloride, sodium chloride 0.9%, sodium chloride 0.9%, acetaminophen, dextrose 50%, diphenhydrAMINE, gentamicin, glucagon (human recombinant), omnipaque, ondansetron, oxyCODONE, ramelteon, sodium chloride 0.9%    Review of Systems   Constitutional: Positive for fatigue. Negative for activity change, appetite change and chills.   HENT: Negative.    Eyes: Negative.    Respiratory: Negative for chest tightness and shortness of breath.    Cardiovascular: Positive for leg swelling. Negative for palpitations.   Gastrointestinal: Positive for abdominal distention, abdominal pain and constipation. Negative for nausea and vomiting.   Genitourinary: Negative.    Musculoskeletal: Positive for arthralgias.   Skin: Negative.    Allergic/Immunologic: Positive for immunocompromised state.   Neurological: Negative.    Psychiatric/Behavioral: Positive for confusion and decreased concentration. The patient is nervous/anxious.      Objective:     Vital Signs (Most Recent):  Temp: 97.6 °F (36.4 °C) (05/28/18 1605)  Pulse: 106 (05/28/18 1605)  Resp: 18 (05/28/18 1605)  BP: 138/80 (05/28/18 1605)  SpO2: 100 % (05/28/18 1605) Vital Signs (24h Range):  Temp:  [97.4 °F (36.3 °C)-98.3 °F (36.8 °C)] 97.6 °F (36.4 °C)  Pulse:  [] 106  Resp:  [12-18] 18  SpO2:  [97 %-100 %] 100 %  BP: (125-150)/(79-97) 138/80     Weight: 65.2 kg (143 lb 11.8 oz)  Body mass index  is 23.2 kg/m².    Intake/Output - Last 3 Shifts       05/26 0700 - 05/27 0659 05/27 0700 - 05/28 0659 05/28 0700 - 05/29 0659    Other   375    Total Intake(mL/kg)   375 (5.8)    Other   576    Total Output     576    Net     -201                 Physical Exam   Constitutional: He is oriented to person, place, and time. Vital signs are normal. He appears well-developed and well-nourished.   Chronic ill appearing    HENT:   Head: Normocephalic and atraumatic.   Right Ear: Hearing, tympanic membrane and ear canal normal.   Left Ear: Hearing, tympanic membrane and ear canal normal.   Nose: Nose normal.   Mouth/Throat: Uvula is midline.   Eyes: Pupils are equal, round, and reactive to light.   Neck: Trachea normal, normal range of motion and full passive range of motion without pain. No JVD present.   Cardiovascular: Normal rate, regular rhythm, normal heart sounds and normal pulses.    No murmur heard.  Pulmonary/Chest: Effort normal and breath sounds normal. No respiratory distress. He has no wheezes. He has no rales. He exhibits no tenderness.   Diminished at bases.    Abdominal: Soft. Normal appearance and bowel sounds are normal. He exhibits distension. He exhibits no ascites. There is tenderness. There is no rigidity, no rebound, no guarding and no CVA tenderness.   Musculoskeletal: Normal range of motion. He exhibits edema (BLE +2-3). He exhibits no tenderness.   Neurological: He is alert and oriented to person, place, and time. He has normal reflexes.   Skin: Skin is warm and dry.   R IJ permacath   Psychiatric: He has a normal mood and affect. His speech is normal and behavior is normal. Judgment and thought content normal. Cognition and memory are normal.       Laboratory:  Immunosuppressants     None        CBC:   Recent Labs  Lab 05/28/18  0512   WBC 4.41   RBC 2.40*   HGB 7.8*   HCT 24.3*   PLT 9*   *   MCH 32.5*   MCHC 32.1     CMP:   Recent Labs  Lab 05/28/18  0511      CALCIUM 8.6*  "  ALBUMIN 2.3*   PROT 5.6*      K 5.6*   CO2 23      BUN 89*   CREATININE 3.3*   ALKPHOS 98   ALT 10   AST 16   BILITOT 1.7*     Coagulation:   Recent Labs  Lab 05/28/18  0512   INR 1.0     Labs within the past 24 hours have been reviewed.    Diagnostic Results:  pertinent imaging reviewed   CT head, CT A/P ordered, pending    Assessment/Plan:     * Abdominal pain    - admit with abdominal pain  - denies N/V  - xray with significant stool, no obstruction noted  - LFTs stable  - will obtain CT A/P  - lactulose and suppository given without response. Plan for brown bomb tonight.           Constipation    - patient with chronic constipation, no BM x3 days  - stool softeners ordered  -not responsive to lactulose and suppository  - pan for brown bomb tonight  - see above for more details.         Acute confusional state    - per pts mother, pt started with cognitive slowing, some confusion, being "off", mouth movements, and slight hand tremors this morning  - will obtain CT head  - Bcx, Ucx, UA  - hold cyclo for now  - jerky movements could be related to reglan (?tardive dyskinesias), will hold        S/P liver transplant    - S/p liver transplant with multiple admissions and last ERCP s/p 5/4   - stent in place per imaging  - LFTs stable  - monitor         Long-term use of immunosuppressant medication    - Pt on cyclo and steroids. Continue to monitor for therapeutic levels and toxicities. Adjust doses accordingly.  - will hold cyclo for mental status changes          Prophylactic immunotherapy    - please see long term IS        At risk for opportunistic infections    - pentamidine for pcp prophylaxis (last treatment 5/21)  - valcyte on hold as neutropenic, continue surveillance CMV PCR ( last checked 5/22, undetected).  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  Per hematology this may be reactive to underlying organ dysfunction, drug and or peripheral destruction (specifically referring to platelets  - HIT panel negative previously.   - Per previous Heme recommendations on last admit, cont with steroids  - restarted cyclosporine 5/21  - hematology re consulted this admission and PLT count 9- appreciate recs   - transfuse 1 pack PLTs 5/28          Anemia of chronic renal failure    - h/h chronically low but stable  - monitor cbc daily  - hematology following, appreciate assistance           ESRD Monday/Friday    - nephrology following  - outpatient HD MF schedule  - patient to HD today, but adamant about being taken off after 45 minutes. Will likely need HD again tomorrow.   - plan to repeat labs tonight.             VTE Risk Mitigation         Ordered     Place ISMA hose  Until discontinued      05/27/18 2019     Place sequential compression device  Until discontinued      05/27/18 2019     Reason for No Pharmacological VTE Prophylaxis  Once      05/27/18 2019     IP VTE HIGH RISK PATIENT  Once      05/27/18 2019          The patients clinical status was discussed at multidisplinary rounds, involving transplant surgery, transplant medicine, pharmacy, nursing, nutrition, and social work    Discharge Planning: Not a candidate for d/c at this time.    Fransisca Thomas PA-C  Liver Transplant  [facility]

## 2018-05-28 NOTE — ED NOTES
Pt resting on stretcher, no complaints at this time. Family member remains at the bedside. Pt given blankets per request.

## 2018-05-28 NOTE — ASSESSMENT & PLAN NOTE
"- per pts mother, pt started with cognitive slowing, some confusion, being "off", mouth movements, and slight hand tremors this morning  - will obtain CT head  - Bcx, Ucx, UA  - hold cyclo for now  - jerky movements could be related to reglan (?tardive dyskinesias), will hold  "

## 2018-05-28 NOTE — SUBJECTIVE & OBJECTIVE
Scheduled Meds:   sodium chloride 0.9%   Intravenous Once    sodium chloride 0.9%   Intravenous Once    amLODIPine  5 mg Oral Daily    bisacodyl  10 mg Oral Daily    docusate sodium  100 mg Oral BID    dronabinol  2.5 mg Oral QHS    famotidine  20 mg Oral QHS    gabapentin  100 mg Oral QHS    glycerin 99.5%  100 mL Rectal Once    And    magnesium citrate  296 mL Rectal Once    levETIRAcetam  500 mg Oral BID    levothyroxine  75 mcg Oral Before breakfast    predniSONE  60 mg Oral Daily    sevelamer carbonate  1,600 mg Oral TID WM    ursodiol  300 mg Oral BID     Continuous Infusions:  PRN Meds:sodium chloride, sodium chloride 0.9%, sodium chloride 0.9%, acetaminophen, dextrose 50%, diphenhydrAMINE, gentamicin, glucagon (human recombinant), omnipaque, ondansetron, oxyCODONE, ramelteon, sodium chloride 0.9%    Review of Systems   Constitutional: Positive for fatigue. Negative for activity change, appetite change and chills.   HENT: Negative.    Eyes: Negative.    Respiratory: Negative for chest tightness and shortness of breath.    Cardiovascular: Positive for leg swelling. Negative for palpitations.   Gastrointestinal: Positive for abdominal distention, abdominal pain and constipation. Negative for nausea and vomiting.   Genitourinary: Negative.    Musculoskeletal: Positive for arthralgias.   Skin: Negative.    Allergic/Immunologic: Positive for immunocompromised state.   Neurological: Negative.    Psychiatric/Behavioral: Positive for confusion and decreased concentration. The patient is nervous/anxious.      Objective:     Vital Signs (Most Recent):  Temp: 97.6 °F (36.4 °C) (05/28/18 1605)  Pulse: 106 (05/28/18 1605)  Resp: 18 (05/28/18 1605)  BP: 138/80 (05/28/18 1605)  SpO2: 100 % (05/28/18 1605) Vital Signs (24h Range):  Temp:  [97.4 °F (36.3 °C)-98.3 °F (36.8 °C)] 97.6 °F (36.4 °C)  Pulse:  [] 106  Resp:  [12-18] 18  SpO2:  [97 %-100 %] 100 %  BP: (125-150)/(79-97) 138/80     Weight: 65.2 kg  (143 lb 11.8 oz)  Body mass index is 23.2 kg/m².    Intake/Output - Last 3 Shifts       05/26 0700 - 05/27 0659 05/27 0700 - 05/28 0659 05/28 0700 - 05/29 0659    Other   375    Total Intake(mL/kg)   375 (5.8)    Other   576    Total Output     576    Net     -201                 Physical Exam   Constitutional: He is oriented to person, place, and time. Vital signs are normal. He appears well-developed and well-nourished.   Chronic ill appearing    HENT:   Head: Normocephalic and atraumatic.   Right Ear: Hearing, tympanic membrane and ear canal normal.   Left Ear: Hearing, tympanic membrane and ear canal normal.   Nose: Nose normal.   Mouth/Throat: Uvula is midline.   Eyes: Pupils are equal, round, and reactive to light.   Neck: Trachea normal, normal range of motion and full passive range of motion without pain. No JVD present.   Cardiovascular: Normal rate, regular rhythm, normal heart sounds and normal pulses.    No murmur heard.  Pulmonary/Chest: Effort normal and breath sounds normal. No respiratory distress. He has no wheezes. He has no rales. He exhibits no tenderness.   Diminished at bases.    Abdominal: Soft. Normal appearance and bowel sounds are normal. He exhibits distension. He exhibits no ascites. There is tenderness. There is no rigidity, no rebound, no guarding and no CVA tenderness.   Musculoskeletal: Normal range of motion. He exhibits edema (BLE +2-3). He exhibits no tenderness.   Neurological: He is alert and oriented to person, place, and time. He has normal reflexes.   Skin: Skin is warm and dry.   R IJ permacath   Psychiatric: He has a normal mood and affect. His speech is normal and behavior is normal. Judgment and thought content normal. Cognition and memory are normal.       Laboratory:  Immunosuppressants     None        CBC:   Recent Labs  Lab 05/28/18  0512   WBC 4.41   RBC 2.40*   HGB 7.8*   HCT 24.3*   PLT 9*   *   MCH 32.5*   MCHC 32.1     CMP:   Recent Labs  Lab 05/28/18  0511       CALCIUM 8.6*   ALBUMIN 2.3*   PROT 5.6*      K 5.6*   CO2 23      BUN 89*   CREATININE 3.3*   ALKPHOS 98   ALT 10   AST 16   BILITOT 1.7*     Coagulation:   Recent Labs  Lab 05/28/18  0512   INR 1.0     Labs within the past 24 hours have been reviewed.    Diagnostic Results:  pertinent imaging reviewed   CT head, CT A/P ordered, pending

## 2018-05-28 NOTE — ASSESSMENT & PLAN NOTE
- h/h chronically low but stable  - monitor cbc daily  - hematology following, appreciate assistance

## 2018-05-28 NOTE — H&P
[facility]  Transplant Surgery  History & Physical    Patient Name: Jhonny Diana  MRN: [id number]  Admission Date: 5/27/2018  Attending Physician: Freedom Harding MD   Primary Care Provider: Primary Doctor No    Patient information was obtained from patient and ER records.     Subjective:     Chief Complaint/Reason for Admission: Abdominal pain    History of Present Illness:  Patient is a 27 y.o. male with a past medical history of liver transplant which was complicated by biliary stricture, who has been re-admitted numerous times in the last year, recently discharged a few days ago. He presents today with worsening abdominal pain since Friday and distention. Denies nausea, vomiting, sob, cp, fevers, or any other new or concerning symptoms. Reports having a bowel movement yesterday. Otherwise feeling well.     No current facility-administered medications on file prior to encounter.      Current Outpatient Prescriptions on File Prior to Encounter   Medication Sig    amLODIPine (NORVASC) 5 MG tablet Take 1 tablet (5 mg total) by mouth once daily.    bisacodyl (DULCOLAX) 5 mg EC tablet Take 2 tablets (10 mg total) by mouth once daily.    cycloSPORINE modified, NEORAL, (NEORAL) 100 MG capsule Take 1 capsule (100 mg total) by mouth 2 (two) times daily.    cycloSPORINE modified, NEORAL, 25 MG capsule Take 1 capsule (25 mg total) by mouth 2 (two) times daily. Total 125mg twice daily.    dronabinol (MARINOL) 2.5 MG capsule Take 1 capsule (2.5 mg total) by mouth every evening.    ergocalciferol (ERGOCALCIFEROL) 50,000 unit Cap Take 1 capsule (50,000 Units total) by mouth every 7 days.    famotidine (PEPCID) 20 MG tablet Take 1 tablet (20 mg total) by mouth every evening.    fluconazole (DIFLUCAN) 200 MG Tab Take 1 tablet (200 mg total) by mouth once daily. STOP 6/23/18    gabapentin (NEURONTIN) 100 MG capsule Take 1 capsule (100 mg total) by mouth every evening.    levETIRAcetam (KEPPRA) 500  MG Tab Take 1 tablet (500 mg total) by mouth 2 (two) times daily.    levothyroxine (SYNTHROID) 75 MCG tablet Take 1 tablet (75 mcg total) by mouth before breakfast.    metoclopramide HCl (REGLAN) 10 MG tablet Take 1 tablet (10 mg total) by mouth 4 (four) times daily before meals and nightly.    oxyCODONE (ROXICODONE) 10 mg Tab immediate release tablet Take 0.5-1 tablets (5-10 mg total) by mouth every 4 (four) hours as needed for Pain.    predniSONE (DELTASONE) 20 MG tablet Take daily by mouth: 60mg 5/21-5/27, 50mg (2.5 tab) 5/28-6/3, 40mg (2 tab) 6/4-6/10, 30mg (1.5 tab) 6/11-6/17, 20mg (1 tab) 6/18-6/24, then 10mg (0.5 tab) daily beginning 6/25    sevelamer carbonate (RENVELA) 800 mg Tab Take 1 tablet (800 mg total) by mouth 3 (three) times daily with meals.    ursodiol (ACTIGALL) 300 mg capsule Take 1 capsule (300 mg total) by mouth 2 (two) times daily.    docusate sodium (COLACE) 100 MG capsule Take 1 capsule (100 mg total) by mouth 2 (two) times daily.    ondansetron (ZOFRAN-ODT) 8 MG TbDL Take 1 tablet (8 mg total) by mouth every 8 (eight) hours as needed (nausea).       Review of patient's allergies indicates:   Allergen Reactions    Bactrim [sulfamethoxazole-trimethoprim] Other (See Comments)     Mookie Trell Syndrome       Past Medical History:   Diagnosis Date    Alcoholic hepatitis with ascites     Alcoholic hepatitis with ascites     ESRD on dialysis     History of hematemesis 9/28/2017    Hypertension     Renal disorder     Seizures      Past Surgical History:   Procedure Laterality Date    APPENDECTOMY      ERCP      ESOPHAGOGASTRODUODENOSCOPY      LIVER TRANSPLANT       Family History     Problem Relation (Age of Onset)    No Known Problems Mother        Social History Main Topics    Smoking status: Former Smoker     Packs/day: 1.00     Years: 10.00     Types: Cigarettes     Start date: 2/19/2017    Smokeless tobacco: Never Used    Alcohol use No      Comment: a fifth of liquor  daily for years, cut back over last 2 months    Drug use: No    Sexual activity: Not on file     Review of Systems   Gastrointestinal: Positive for abdominal distention and abdominal pain.   All other systems reviewed and are negative.    Objective:     Vital Signs (Most Recent):  Temp: 98.6 °F (37 °C) (05/27/18 1610)  Pulse: 97 (05/27/18 1846)  Resp: 12 (05/27/18 1846)  BP: (!) 138/97 (05/27/18 1846)  SpO2: 100 % (05/27/18 1846) Vital Signs (24h Range):  Temp:  [98.6 °F (37 °C)] 98.6 °F (37 °C)  Pulse:  [] 97  Resp:  [12-20] 12  SpO2:  [98 %-100 %] 100 %  BP: (138-149)/(90-97) 138/97     Weight: 63.5 kg (140 lb)  Body mass index is 22.6 kg/m².    Physical Exam   Constitutional: He is oriented to person, place, and time. No distress.   HENT:   Head: Atraumatic.   Eyes: EOM are normal. No scleral icterus.   Cardiovascular: Normal rate.    Pulmonary/Chest: Effort normal.   Abdominal: Soft. He exhibits distension (mildly distended). He exhibits no mass. There is tenderness (mildly tender). There is no guarding.   Neurological: He is alert and oriented to person, place, and time. No cranial nerve deficit.   Psychiatric: He has a normal mood and affect. His behavior is normal.       Significant Labs:  CBC:   Recent Labs  Lab 05/27/18 1717   WBC 8.32   RBC 3.07*   HGB 10.0*   HCT 30.6*   PLT 15*   *   MCH 32.6*   MCHC 32.7     CMP:   Recent Labs  Lab 05/27/18 1717   *   CALCIUM 9.2   ALBUMIN 2.8*   PROT 6.9      K 5.0   CO2 22*      BUN 72*   CREATININE 3.3*   ALKPHOS 118   ALT 14   AST 21   BILITOT 1.9*       Significant Diagnostics:  I have reviewed all pertinent imaging results/findings within the past 24 hours.    Assessment/Plan:     There are no hospital problems to display for this patient.    VTE Risk Mitigation     None        Admit to TSU  NPO, IVF  Home meds  Flat and Erect of abdomen  Discussed with staff    Imer Andrade MD  General Surgery  [facility]  [place]

## 2018-05-28 NOTE — CONSULTS
[facility]  Nephrology  Consult Note    Patient Name: Jhonny Diana  MRN: [id number]  Admission Date: 5/27/2018  Hospital Length of Stay: 1 days  Attending Provider: Nathanael Medina MD   Primary Care Physician: Primary Doctor No  Principal Problem:<principal problem not specified>    Inpatient consult to Nephrology  Consult performed by: SAMARA SOLOMON  Consult ordered by: KEATON NIÑO  Reason for consult: esrd        Subjective:     HPI: 26 y/o  male with significant history for ETOH cirrhosis sp  S/p DDLT 10/19/2017 complicated by  seizures (swtiched off prograf to cyclo)/superficial wound infection now resolved, [facility] dialysis dependant in iHD MWF, multiple admissions for fevers finally dx with peritonitis in November/neg for bile leak, and recent admission 5/8-5/23 for fever, elevated bili, and anemia at which time seen by [facility] recommended no ERCP as imagining showed stent in place and Hematology recommended steroids for possible ITP/loweing dose of cyclosporine for possible TMA now readmitted for acute on chronic mid abdominal pain and constipation. Denies nausea or vomiting. Mother reports good appetite.  ml last Thursday but have not recorded since. KUB showed no obstruction, large stool through out colon, and common duct stent unchanged and pnumobilia present.  Nephrology consulted for hemodialysis. LAURA dialysis dependant on HD twice a week Mon and Fri via [facility] under the care of Dr. Peterson. Last HD Friday 5/25.     Past Medical History:   Diagnosis Date    Alcoholic hepatitis with ascites     Alcoholic hepatitis with ascites     ESRD on dialysis     History of hematemesis 9/28/2017    Hypertension     Renal disorder     Seizures        Past Surgical History:   Procedure Laterality Date    APPENDECTOMY      ERCP      ESOPHAGOGASTRODUODENOSCOPY      LIVER TRANSPLANT         Review of patient's allergies indicates:   Allergen Reactions     Bactrim [sulfamethoxazole-trimethoprim] Other (See Comments)     Mookie Trell Syndrome     Current Facility-Administered Medications   Medication Frequency    acetaminophen tablet 650 mg Q4H PRN    amLODIPine tablet 5 mg Daily    bisacodyl EC tablet 10 mg Daily    cycloSPORINE modified (NEORAL) capsule 125 mg BID    dextrose 50% injection 12.5 g PRN    diphenhydrAMINE injection 25 mg Q4H PRN    docusate sodium capsule 100 mg BID    dronabinol capsule 2.5 mg QHS    famotidine tablet 20 mg QHS    gabapentin capsule 100 mg QHS    glucagon (human recombinant) injection 1 mg PRN    lactulose 20 gram/30 mL solution Soln 10 g TID    levETIRAcetam tablet 500 mg BID    levothyroxine tablet 75 mcg Before breakfast    ondansetron disintegrating tablet 8 mg Q8H PRN    oxyCODONE immediate release tablet 5 mg Q4H PRN    predniSONE tablet 60 mg Daily    promethazine (PHENERGAN) 12.5 mg in dextrose 5 % 50 mL IVPB Q6H PRN    ramelteon tablet 8 mg Nightly PRN    sevelamer carbonate tablet 800 mg TID WM    sodium chloride 0.9% flush 3 mL PRN    ursodiol capsule 300 mg BID     Family History     Problem Relation (Age of Onset)    No Known Problems Mother        Social History Main Topics    Smoking status: Former Smoker     Packs/day: 1.00     Years: 10.00     Types: Cigarettes     Start date: 2/19/2017    Smokeless tobacco: Never Used    Alcohol use No      Comment: a fifth of liquor daily for years, cut back over last 2 months    Drug use: No    Sexual activity: Not on file     Review of Systems   Constitutional: Positive for fatigue. Negative for appetite change and chills.   HENT: Negative.    Eyes: Negative.    Cardiovascular: Positive for leg swelling. Negative for palpitations.   Gastrointestinal: Positive for abdominal distention, abdominal pain and constipation. Negative for nausea and vomiting.   Genitourinary: Negative.    Musculoskeletal: Positive for arthralgias.   Skin: Negative.     Neurological: Negative.    Psychiatric/Behavioral: Negative.      Objective:     Vital Signs (Most Recent):  Temp: 98.3 °F (36.8 °C) (05/28/18 0858)  Pulse: 95 (05/28/18 1047)  Resp: 14 (05/28/18 0858)  BP: 132/85 (05/28/18 0858)  SpO2: 97 % (05/28/18 0858)  O2 Device (Oxygen Therapy): room air (05/28/18 0350) Vital Signs (24h Range):  Temp:  [97.7 °F (36.5 °C)-98.6 °F (37 °C)] 98.3 °F (36.8 °C)  Pulse:  [] 95  Resp:  [12-20] 14  SpO2:  [97 %-100 %] 97 %  BP: (131-149)/(85-97) 132/85     Weight: 65.2 kg (143 lb 11.8 oz) (05/27/18 2330)  Body mass index is 23.2 kg/m².  Body surface area is 1.74 meters squared.    No intake/output data recorded.    Physical Exam   Constitutional: He is oriented to person, place, and time.   Chronic ill appearing    HENT:   Head: Atraumatic.   Eyes: EOM are normal.   Neck: Neck supple.   Cardiovascular: Normal rate and regular rhythm.    Pulmonary/Chest: Effort normal and breath sounds normal.   Diminished at bases.    Abdominal: Soft. Bowel sounds are normal. He exhibits distension. There is tenderness.   Musculoskeletal: He exhibits edema (BLE +2-3).   Neurological: He is oriented to person, place, and time.   Skin: Skin is warm and dry.   R IJ permacath   Psychiatric: He has a normal mood and affect.       Significant Labs:  All labs within the past 24 hours have been reviewed.    Significant Imaging:  Labs: Reviewed    Assessment/Plan:     ESRD Monday/Friday    LAURA dialysis dependant on HD since 10/16/17 twice a week Mon and Fri 3.5 hrs duration  via [facility] at [facility] under the care of Dr. Peterson. Last HD Friday 5/25. Last recorded  ml last Thursday but feels decrease UOP since.   -Start torsemide 60 mg daily.   -HD today for metabolic clearance and volume management. Dialysate bath adjusted to current labs.     Anemia of CKD  -Hgb 10>7.8?  -Not sure if started on Micerna outpatient as he has been in hospital. Will review outpatient flow sheet.      BMD  Lab Results   Component Value Date    PTH 21.0 05/10/2018    CALCIUM 8.6 (L) 05/28/2018    CAION 1.07 03/07/2018    PHOS 6.1 (H) 05/28/2018   -Continue Renvela. Renal diet.                 Thank you for your consult. I will follow-up with patient. Please contact us if you have any additional questions.    Kay Guillen, SAMREEN  Nephrology  [facility]      I have reviewed and concur with the NP's history, physical, assessment, and plan. I have personally interviewed and examined the patient at bedside.

## 2018-05-28 NOTE — ASSESSMENT & PLAN NOTE
- patient with chronic constipation, no BM x3 days  - stool softeners ordered  -not responsive to lactulose and suppository  - pan for brown bomb tonight  - see above for more details.

## 2018-05-28 NOTE — ASSESSMENT & PLAN NOTE
- S/p liver transplant with multiple admissions and last ERCP s/p 5/4   - stent in place per imaging  - LFTs stable  - monitor

## 2018-05-28 NOTE — ASSESSMENT & PLAN NOTE
- nephrology following  - outpatient HD MF schedule  - patient to HD today, but adamant about being taken off after 45 minutes. Will likely need HD again tomorrow.   - plan to repeat labs tonight.

## 2018-05-28 NOTE — ASSESSMENT & PLAN NOTE
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  Per hematology this may be reactive to underlying organ dysfunction, drug and or peripheral destruction (specifically referring to platelets  - HIT panel negative previously.   - Per previous Heme recommendations on last admit, cont with steroids  - restarted cyclosporine 5/21  - hematology re consulted this admission and PLT count 9- appreciate recs   - transfuse 1 pack PLTs 5/28

## 2018-05-29 LAB
ALBUMIN SERPL BCP-MCNC: 2.5 G/DL
ALP SERPL-CCNC: 95 U/L
ALT SERPL W/O P-5'-P-CCNC: 12 U/L
ANION GAP SERPL CALC-SCNC: 12 MMOL/L
ANISOCYTOSIS BLD QL SMEAR: SLIGHT
AST SERPL-CCNC: 17 U/L
BASOPHILS # BLD AUTO: 0 K/UL
BASOPHILS NFR BLD: 0 %
BILIRUB SERPL-MCNC: 1.5 MG/DL
BUN SERPL-MCNC: 84 MG/DL
CALCIUM SERPL-MCNC: 8.6 MG/DL
CHLORIDE SERPL-SCNC: 105 MMOL/L
CO2 SERPL-SCNC: 22 MMOL/L
CREAT SERPL-MCNC: 3.4 MG/DL
CYCLOSPORINE BLD LC/MS/MS-MCNC: 254 NG/ML
DIFFERENTIAL METHOD: ABNORMAL
EOSINOPHIL # BLD AUTO: 0 K/UL
EOSINOPHIL NFR BLD: 0 %
ERYTHROCYTE [DISTWIDTH] IN BLOOD BY AUTOMATED COUNT: 17.6 %
EST. GFR  (AFRICAN AMERICAN): 26.8 ML/MIN/1.73 M^2
EST. GFR  (NON AFRICAN AMERICAN): 23.2 ML/MIN/1.73 M^2
GLUCOSE SERPL-MCNC: 101 MG/DL
HCT VFR BLD AUTO: 22.5 %
HGB BLD-MCNC: 7.1 G/DL
HYPOCHROMIA BLD QL SMEAR: ABNORMAL
IMM GRANULOCYTES # BLD AUTO: 0.06 K/UL
IMM GRANULOCYTES NFR BLD AUTO: 1.1 %
INR PPP: 1
KAPPA LC SER QL IA: 13.5 MG/DL
KAPPA LC/LAMBDA SER IA: 1.47
LAMBDA LC SER QL IA: 9.16 MG/DL
LDH SERPL L TO P-CCNC: 285 U/L
LYMPHOCYTES # BLD AUTO: 0.7 K/UL
LYMPHOCYTES NFR BLD: 14 %
MAGNESIUM SERPL-MCNC: 2.9 MG/DL
MCH RBC QN AUTO: 31.6 PG
MCHC RBC AUTO-ENTMCNC: 31.6 G/DL
MCV RBC AUTO: 100 FL
MONOCYTES # BLD AUTO: 0.6 K/UL
MONOCYTES NFR BLD: 12.2 %
NEUTROPHILS # BLD AUTO: 3.8 K/UL
NEUTROPHILS NFR BLD: 72.7 %
NRBC BLD-RTO: 0 /100 WBC
OVALOCYTES BLD QL SMEAR: ABNORMAL
PHOSPHATE SERPL-MCNC: 6.8 MG/DL
PLATELET # BLD AUTO: 23 K/UL
PMV BLD AUTO: 11.3 FL
POCT GLUCOSE: 149 MG/DL (ref 70–110)
POIKILOCYTOSIS BLD QL SMEAR: SLIGHT
POLYCHROMASIA BLD QL SMEAR: ABNORMAL
POTASSIUM SERPL-SCNC: 5 MMOL/L
POTASSIUM SERPL-SCNC: 5 MMOL/L
POTASSIUM SERPL-SCNC: 5.4 MMOL/L
PROT SERPL-MCNC: 5.7 G/DL
PROTHROMBIN TIME: 10.9 SEC
RBC # BLD AUTO: 2.25 M/UL
RETICS/RBC NFR AUTO: 3.4 %
SODIUM SERPL-SCNC: 139 MMOL/L
WBC # BLD AUTO: 5.23 K/UL

## 2018-05-29 PROCEDURE — 80053 COMPREHEN METABOLIC PANEL: CPT | Mod: NTX

## 2018-05-29 PROCEDURE — 83615 LACTATE (LD) (LDH) ENZYME: CPT | Mod: NTX

## 2018-05-29 PROCEDURE — 85045 AUTOMATED RETICULOCYTE COUNT: CPT | Mod: NTX

## 2018-05-29 PROCEDURE — 84132 ASSAY OF SERUM POTASSIUM: CPT | Mod: NTX

## 2018-05-29 PROCEDURE — 63600175 PHARM REV CODE 636 W HCPCS: Mod: NTX | Performed by: STUDENT IN AN ORGANIZED HEALTH CARE EDUCATION/TRAINING PROGRAM

## 2018-05-29 PROCEDURE — 83735 ASSAY OF MAGNESIUM: CPT | Mod: NTX

## 2018-05-29 PROCEDURE — 83520 IMMUNOASSAY QUANT NOS NONAB: CPT | Mod: 59,NTX

## 2018-05-29 PROCEDURE — 25000003 PHARM REV CODE 250: Mod: NTX | Performed by: PHYSICIAN ASSISTANT

## 2018-05-29 PROCEDURE — 90935 HEMODIALYSIS ONE EVALUATION: CPT | Mod: NTX

## 2018-05-29 PROCEDURE — 90935 HEMODIALYSIS ONE EVALUATION: CPT | Mod: NTX,,, | Performed by: INTERNAL MEDICINE

## 2018-05-29 PROCEDURE — 63600175 PHARM REV CODE 636 W HCPCS: Mod: NTX | Performed by: NURSE PRACTITIONER

## 2018-05-29 PROCEDURE — 85025 COMPLETE CBC W/AUTO DIFF WBC: CPT | Mod: NTX

## 2018-05-29 PROCEDURE — 25000003 PHARM REV CODE 250: Mod: NTX | Performed by: NURSE PRACTITIONER

## 2018-05-29 PROCEDURE — 84100 ASSAY OF PHOSPHORUS: CPT | Mod: NTX

## 2018-05-29 PROCEDURE — 99233 SBSQ HOSP IP/OBS HIGH 50: CPT | Mod: NTX,,, | Performed by: PHYSICIAN ASSISTANT

## 2018-05-29 PROCEDURE — 20600001 HC STEP DOWN PRIVATE ROOM: Mod: NTX

## 2018-05-29 PROCEDURE — 80158 DRUG ASSAY CYCLOSPORINE: CPT | Mod: NTX

## 2018-05-29 PROCEDURE — 36415 COLL VENOUS BLD VENIPUNCTURE: CPT | Mod: NTX

## 2018-05-29 PROCEDURE — 85610 PROTHROMBIN TIME: CPT | Mod: NTX

## 2018-05-29 PROCEDURE — 25000003 PHARM REV CODE 250: Mod: NTX | Performed by: STUDENT IN AN ORGANIZED HEALTH CARE EDUCATION/TRAINING PROGRAM

## 2018-05-29 RX ORDER — SODIUM CHLORIDE 9 MG/ML
INJECTION, SOLUTION INTRAVENOUS ONCE
Status: COMPLETED | OUTPATIENT
Start: 2018-05-29 | End: 2018-05-29

## 2018-05-29 RX ORDER — SODIUM CHLORIDE 9 MG/ML
INJECTION, SOLUTION INTRAVENOUS
Status: DISCONTINUED | OUTPATIENT
Start: 2018-05-29 | End: 2018-05-30

## 2018-05-29 RX ADMIN — URSODIOL 300 MG: 300 CAPSULE ORAL at 06:05

## 2018-05-29 RX ADMIN — DOCUSATE SODIUM 100 MG: 100 CAPSULE, LIQUID FILLED ORAL at 06:05

## 2018-05-29 RX ADMIN — OXYCODONE HYDROCHLORIDE 5 MG: 5 TABLET ORAL at 12:05

## 2018-05-29 RX ADMIN — LEVETIRACETAM 500 MG: 500 TABLET ORAL at 07:05

## 2018-05-29 RX ADMIN — SEVELAMER CARBONATE 1600 MG: 800 TABLET, FILM COATED ORAL at 07:05

## 2018-05-29 RX ADMIN — PREDNISONE 60 MG: 10 TABLET ORAL at 07:05

## 2018-05-29 RX ADMIN — FAMOTIDINE 20 MG: 20 TABLET ORAL at 09:05

## 2018-05-29 RX ADMIN — AMLODIPINE BESYLATE 5 MG: 5 TABLET ORAL at 06:05

## 2018-05-29 RX ADMIN — LEVETIRACETAM 500 MG: 500 TABLET ORAL at 09:05

## 2018-05-29 RX ADMIN — SEVELAMER CARBONATE 1600 MG: 800 TABLET, FILM COATED ORAL at 12:05

## 2018-05-29 RX ADMIN — URSODIOL 300 MG: 300 CAPSULE ORAL at 09:05

## 2018-05-29 RX ADMIN — DOCUSATE SODIUM 100 MG: 100 CAPSULE, LIQUID FILLED ORAL at 09:05

## 2018-05-29 RX ADMIN — DRONABINOL 2.5 MG: 2.5 CAPSULE ORAL at 09:05

## 2018-05-29 RX ADMIN — OXYCODONE HYDROCHLORIDE 5 MG: 5 TABLET ORAL at 06:05

## 2018-05-29 RX ADMIN — GABAPENTIN 100 MG: 100 CAPSULE ORAL at 09:05

## 2018-05-29 RX ADMIN — OXYCODONE HYDROCHLORIDE 5 MG: 5 TABLET ORAL at 09:05

## 2018-05-29 RX ADMIN — GENTAMICIN SULFATE 40 MG: 40 INJECTION, SOLUTION INTRAMUSCULAR; INTRAVENOUS at 04:05

## 2018-05-29 RX ADMIN — SODIUM CHLORIDE: 9 INJECTION, SOLUTION INTRAVENOUS at 02:05

## 2018-05-29 RX ADMIN — BISACODYL 10 MG: 5 TABLET, COATED ORAL at 06:05

## 2018-05-29 RX ADMIN — LEVOTHYROXINE SODIUM 75 MCG: 75 TABLET ORAL at 06:05

## 2018-05-29 RX ADMIN — OXYCODONE HYDROCHLORIDE 5 MG: 5 TABLET ORAL at 04:05

## 2018-05-29 RX ADMIN — FLUCONAZOLE 200 MG: 200 TABLET ORAL at 07:05

## 2018-05-29 NOTE — ASSESSMENT & PLAN NOTE
"- Pt continues with cognitive slowing, confusion, being "off", twitchy mouth movements, and slight hand tremors   - has conversations that are appropriate in context with inappropriate responses   - CT negative for acute processes  - Bcx NGTD, UA clean, UCx pending.  - Cont hold cyclo for now  - jerky movements could be related to reglan (?tardive dyskinesias), will hold  "

## 2018-05-29 NOTE — PROGRESS NOTES
Pt K+ resulted 5.8. Pt on tele running SR. Orders placed by Shereen VÁSQUEZ DNP to shift pt and to give Kayexalate. I asked Shereen if should still go ahead with giving brown bomb enema at this time. Shereen stated to try giving Kayexalate first and then if no BM go ahead with brown bomb as planned.   Shereen also ordered Calcium gluconate and IVP sodium bicarb.

## 2018-05-29 NOTE — PLAN OF CARE
Problem: Patient Care Overview  Goal: Plan of Care Review  Outcome: Ongoing (interventions implemented as appropriate)    Pt AAOx4, but continues with delayed responses and twitching movements. CT head & A/P with no evidence to explain change in behavior.  Blood cx NGTD. Urine cx in process. Holding cyclosporine. CT abdomen did show moderate amount of stool in colon consistent with constipation. Pt refusing brown bomb enema at this time. SAMREEN Regan made aware.   Pt c/o abdominal pain. Pt given oxy 5mg once for c/o abdominal pain. Plts ^ from 9 to 27 after receiving 1 pack of plts. Pt refused to be hooked back up for HD yesterday after only 45 minutes of HD. K+ was 5.8 last night. Pt shifted and given Kayexalate with no subsequent BM. K+ came down to 5.4.  Plan for pt to have HD this AM. Pt walked halls with walker and stand by assist and also had mother wheel him around halls in wheel chair. Pt has remained free from falls or injury thus far. Bed is in low/ locked position, side rails are up x 2, call light is in reach. I do not expect that pt would try to get OOB by himself, but bed alarm set out of an abundance of precaution. Will continue to monitor.

## 2018-05-29 NOTE — ASSESSMENT & PLAN NOTE
- continue dronabinol for appetite stimulant  - po supplements  - appetite improving since initiation of steroids  - reglan on hold d/t mental status and jerking movement changes

## 2018-05-29 NOTE — SUBJECTIVE & OBJECTIVE
Scheduled Meds:   sodium chloride 0.9%   Intravenous Once    sodium chloride 0.9%   Intravenous Once    sodium chloride 0.9%   Intravenous Once    amLODIPine  5 mg Oral Daily    bisacodyl  10 mg Oral Daily    docusate sodium  100 mg Oral BID    dronabinol  2.5 mg Oral QHS    famotidine  20 mg Oral QHS    fluconazole  200 mg Oral Daily    gabapentin  100 mg Oral QHS    levETIRAcetam  500 mg Oral BID    levothyroxine  75 mcg Oral Before breakfast    predniSONE  60 mg Oral Daily    sevelamer carbonate  1,600 mg Oral TID WM    ursodiol  300 mg Oral BID     Continuous Infusions:  PRN Meds:sodium chloride, sodium chloride 0.9%, sodium chloride 0.9%, sodium chloride 0.9%, acetaminophen, [COMPLETED] calcium gluconate IVPB **AND** calcium gluconate IVPB, dextrose 50%, [COMPLETED] dextrose 50% **AND** dextrose 50% **AND** [COMPLETED] insulin regular, diphenhydrAMINE, gentamicin, glucagon (human recombinant), omnipaque, ondansetron, oxyCODONE, ramelteon, sodium chloride 0.9%    Review of Systems   Constitutional: Positive for fatigue. Negative for activity change, appetite change and chills.   HENT: Negative.    Eyes: Negative.    Respiratory: Negative for chest tightness and shortness of breath.    Cardiovascular: Positive for leg swelling. Negative for palpitations.   Gastrointestinal: Positive for abdominal distention, abdominal pain and constipation. Negative for nausea and vomiting.   Genitourinary: Negative.    Musculoskeletal: Negative for arthralgias.   Skin: Negative.    Allergic/Immunologic: Positive for immunocompromised state.   Neurological: Negative.    Psychiatric/Behavioral: Positive for confusion and decreased concentration. The patient is nervous/anxious.      Objective:     Vital Signs (Most Recent):  Temp: 97.8 °F (36.6 °C) (05/29/18 1300)  Pulse: (!) 111 (05/29/18 1600)  Resp: 18 (05/29/18 1600)  BP: (!) 151/89 (05/29/18 1600)  SpO2: 100 % (05/29/18 1213) Vital Signs (24h Range):  Temp:   [97.6 °F (36.4 °C)-98.6 °F (37 °C)] 97.8 °F (36.6 °C)  Pulse:  [] 111  Resp:  [16-20] 18  SpO2:  [95 %-100 %] 100 %  BP: (135-164)/() 151/89     Weight: 65.2 kg (143 lb 11.8 oz)  Body mass index is 23.2 kg/m².    Intake/Output - Last 3 Shifts       05/27 0700 - 05/28 0659 05/28 0700 - 05/29 0659 05/29 0700 - 05/30 0659    P.O.  700     Blood  483.3     Other  375     Total Intake(mL/kg)  1558.3 (23.9)     Urine (mL/kg/hr)  227 (0.1)     Emesis/NG output  0 (0)     Other  576 (0.4)     Stool  0 (0)     Blood  0 (0)     Total Output   803      Net   +755.3             Urine Occurrence  5 x     Stool Occurrence  0 x 0 x    Emesis Occurrence  0 x           Physical Exam   Constitutional: He is oriented to person, place, and time. Vital signs are normal. He appears well-developed and well-nourished.   Chronic ill appearing    HENT:   Head: Normocephalic and atraumatic.   Right Ear: Hearing, tympanic membrane and ear canal normal.   Left Ear: Hearing, tympanic membrane and ear canal normal.   Nose: Nose normal.   Mouth/Throat: Uvula is midline.   Eyes: Pupils are equal, round, and reactive to light.   Neck: Trachea normal, normal range of motion and full passive range of motion without pain. No JVD present.   Cardiovascular: Normal rate, regular rhythm, normal heart sounds and normal pulses.    No murmur heard.  Pulmonary/Chest: Effort normal and breath sounds normal. No respiratory distress. He has no wheezes. He has no rales. He exhibits no tenderness.   Diminished at bases.    Abdominal: Soft. Normal appearance and bowel sounds are normal. He exhibits distension. He exhibits no ascites. There is tenderness. There is no rigidity, no rebound, no guarding and no CVA tenderness.   Musculoskeletal: Normal range of motion. He exhibits edema (BLE +2-3). He exhibits no tenderness.   Neurological: He is alert and oriented to person, place, and time. He has normal reflexes.   Skin: Skin is warm and dry.   R IJ  permacath   Psychiatric: He has a normal mood and affect. His speech is normal and behavior is normal. Judgment and thought content normal. Cognition and memory are normal.       Laboratory:  Immunosuppressants     None        CBC:   Recent Labs  Lab 05/29/18  0350   WBC 5.23   RBC 2.25*   HGB 7.1*   HCT 22.5*   PLT 23*   *   MCH 31.6*   MCHC 31.6*     CMP:   Recent Labs  Lab 05/29/18  0350      CALCIUM 8.6*   ALBUMIN 2.5*   PROT 5.7*      K 5.0  5.0   CO2 22*      BUN 84*   CREATININE 3.4*   ALKPHOS 95   ALT 12   AST 17   BILITOT 1.5*     Labs within the past 24 hours have been reviewed.    Diagnostic Results:  pertinent imaging reviewed

## 2018-05-29 NOTE — PROGRESS NOTES
HD treatment complete. Duration of treatment 2 hours and 45 minutes. Ended treatment 15 minutes early per pt. Request. Removed 1. 8 L and was tolerated well. Catheter to right chest wall was flushed with NS and locked with gent. Capped and taped.

## 2018-05-29 NOTE — ASSESSMENT & PLAN NOTE
- pentamidine for pcp prophylaxis (last treatment 5/21)  - valcyte on hold as neutropenic, continue surveillance CMV PCR ( last checked 5/22, undetected). CMV PCR 5/29, pending  - fluconazole for antigungal

## 2018-05-29 NOTE — NURSING
"RN called to bedside by mother. Mother in restroom with patient who is sitting on the toilet sobbing stating "I don't understand what she's saying." RN reinforced to patient that he is in the restroom and can go to the bathroom if he needed. He continued to sob and say "I don't understand." Charge RN made aware of pt's confusion and this RN's concern as to patient's behavior. Pt assisted back to chair.   "

## 2018-05-29 NOTE — ASSESSMENT & PLAN NOTE
- admit with abdominal pain  - denies N/V  - xray with significant stool, no obstruction noted  - LFTs stable  - CT A/P with ascites, anasarca, and moderate stool. No other significant/acute findings noted.   - lactulose, suppository, and kayexalate given without response. Brown bomb enema given this morning (5/29) resulting in small-medium BM.

## 2018-05-29 NOTE — NURSING
Pt to have brown bomb enema this AM. Pt refusing to allow RN to complete it despite this RN and pt's mother telling him it is not safe for him to complete it independently. Await arrival of second RN to assist with enema. RIMA Gongora aware.

## 2018-05-29 NOTE — PLAN OF CARE
Problem: Fall Risk (Adult)  Goal: Identify Related Risk Factors and Signs and Symptoms  Related risk factors and signs and symptoms are identified upon initiation of Human Response Clinical Practice Guideline (CPG)   Outcome: Ongoing (interventions implemented as appropriate)  Pt AAO x 2 with occasional times where pt is oriented x4. Pt very delayed with responses and unsteady on feet. Pt confused as to events of day - at times insisting he already completed the task. Pt to HD this afternoon but became upset and anxious and mother had to come to bedside in dialysis. Pt free from falls and injury. Raul zhao completed this AM with small-medium BM noted. Pt ambulated with walker multiple times during shift.

## 2018-05-29 NOTE — PROGRESS NOTES
SW met with pt and pts mom both in jenkins and pts bedside today, as patient having difficulty coping.  Pt refused to stay on HD yesterday evening, only staying on for 45 minutes due to pain and not being able to keep still.    Pt tearful, in pain, but in agreement to getting enema this am, like now and then going to HD for this afternoon.  Team will time pain meds to be given prior to pt going down for HD.  At this time this am, pt agreeable to staying on HD for full session.   RIMA Gongora and SW at bedside providing psychosocial support and encouragement to patient and pts mom.   Pt not agreeing to HD treatment is not normal for this patient, which is concerning to what is going on with the patient this admit.   SW will continue to monitor patient for any ongoing coping issues.   SW remains available for all transplant support, resources, education and psychosocial support.

## 2018-05-29 NOTE — ASSESSMENT & PLAN NOTE
- patient with chronic constipation, no BM x3 days  - stool softeners ordered  - lactulose, suppository, and kayexalate given without response. Brown bomb enema given this morning (5/29) resulting in small-medium BM.  - see above for further details.

## 2018-05-29 NOTE — ASSESSMENT & PLAN NOTE
- S/p liver transplant with multiple admissions and last ERCP 5/4   - stent in place per imaging  - LFTs stable  - monitor

## 2018-05-29 NOTE — NURSING
Pt with small-medium amount of stool after brown bomb completed. Slight blood tinge to stool noted. Pt assisted back to bed and bed alarm set. Reminded patient to call for assistance before getting out of bed.

## 2018-05-29 NOTE — PROGRESS NOTES
"Admin instructions for Kayexalate state," Do not administer if potassium reaches 5.4 mEq/L or less.".   Most recent K+ just came back 5.4 so will hold this dose.   "

## 2018-05-29 NOTE — ASSESSMENT & PLAN NOTE
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  Per hematology this may be reactive to underlying organ dysfunction, drug and or peripheral destruction (specifically referring to platelets  - HIT panel negative previously.   - Per previous Heme recommendations on last admit, cont with steroids  - restarted cyclosporine 5/21  - hematology re consulted this admission, appreciate recs and assistance.    - continue steroids   - transfuse for PLTS <10  - received 1 pack PLTs 5/28 d/t PLT count of 9. Responded well.

## 2018-05-29 NOTE — PROGRESS NOTES
I agree with the assessments and treatment provided by CRUZITO Guerin LPN. Patient restless and tearful throughout treatment. Mother called to bedside to assist patient in making it through the treatment.

## 2018-05-29 NOTE — ASSESSMENT & PLAN NOTE
- nephrology following  - outpatient HD  schedule  - patient to HD 5/28, but adamant about being taken off after 45 minutes.     -with hyperkalemia d/t incomplete HD overnight. Given insulin/dextrose, calcium gluconate, diuretics, and bicarb with improvement.  - Pt to HD again today for electrolyte and toxin clearance and volume removal.

## 2018-05-29 NOTE — PROGRESS NOTES
HD treatment started. No complications with access to right chest wall catheter. Lines secured and telemetry in place.

## 2018-05-29 NOTE — PROGRESS NOTES
Pt given Kayexalate for K+ of 5.8. Pt still with no BM. Awaiting K + results to see if still >5.4 if need to give pt second dose of Kayexalate. Pt refusing brown bomb enema at this time.

## 2018-05-29 NOTE — PROGRESS NOTES
[facility]  Liver Transplant  Progress Note    Patient Name: Jhonny Diana  MRN: [id number]  Admission Date: 2018  Hospital Length of Stay: 2 days  Code Status: Full Code  Primary Care Provider: Primary Doctor No  Post-Operative Day: 222    ORGAN:   LIVER  Disease Etiology: Acute Alcoholic Hepatitis  Donor Type:    - Brain Death  CDC High Risk:   No  Donor CMV Status:   Donor CMV Status: Positive  Donor HBcAB:   Negative  Donor HCV Status:   Negative  Whole or Partial: Whole Liver  Biliary Anastomosis: End to End  Arterial Anatomy: Standard  Subjective:     History of Present Illness:  Mr. 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  S/p OLTX 10/19/2017 (Liver) with steroid induction. Post op course with multiple complications to include biliary stricture, FUO, ESRD, and chronic abdominal pain. Recently discharged on  after workup for abdominal pain and thrombocytopenia. He presented  with worsening abdominal pain since Friday and distention. Denies nausea, vomiting, sob, cp, fevers, or any other new or concerning symptoms. Reports no BM x3 days.        Hospital Course:  Hospital Course: Patient with persistent abdominal pain and no BM despite lactulose and suppository. Unable to tolerate HD d/t agitation and abdominal discomfort- requested to be taken off. Potassium elevated since HD not completed. Shifted with insulin/dextrose, calcium gluc, bicarb, and diuretics.  Potassium improved following. Patient remains tearful and overwhelmed. Still with some abnormal mouth and hand twitching, odd comments, and confusion.     Interval history: Overnight with elevated potassium following incomplete HD. Shifted and given diuretics, with improvement. Planning for HD today for electrolyte improvement and volume removal. Patient without BM following lactulose, suppository, and kayexalate. Given Brown Bomb this morning, resulting in small-medium BM. CT A/P with PO contrast  "yesterday with ascites, anasarca, and moderate amounts of stool- otherwise unremarkable. Patient continues with "odd" remarks, cognitive slowing and tremors of mouth and hands. Patient remains tearful and overwhelmed. CT head negative for acute processes. Bcx NGTD, UA clean, Ucx pending. Will continue to hold cyclosporine for now.Hematology following. Pt received 1 pack PLTs yesterday with good response. Retic and LDH improved from last admission.     Scheduled Meds:   sodium chloride 0.9%   Intravenous Once    sodium chloride 0.9%   Intravenous Once    sodium chloride 0.9%   Intravenous Once    amLODIPine  5 mg Oral Daily    bisacodyl  10 mg Oral Daily    docusate sodium  100 mg Oral BID    dronabinol  2.5 mg Oral QHS    famotidine  20 mg Oral QHS    fluconazole  200 mg Oral Daily    gabapentin  100 mg Oral QHS    levETIRAcetam  500 mg Oral BID    levothyroxine  75 mcg Oral Before breakfast    predniSONE  60 mg Oral Daily    sevelamer carbonate  1,600 mg Oral TID WM    ursodiol  300 mg Oral BID     Continuous Infusions:  PRN Meds:sodium chloride, sodium chloride 0.9%, sodium chloride 0.9%, sodium chloride 0.9%, acetaminophen, [COMPLETED] calcium gluconate IVPB **AND** calcium gluconate IVPB, dextrose 50%, [COMPLETED] dextrose 50% **AND** dextrose 50% **AND** [COMPLETED] insulin regular, diphenhydrAMINE, gentamicin, glucagon (human recombinant), omnipaque, ondansetron, oxyCODONE, ramelteon, sodium chloride 0.9%    Review of Systems   Constitutional: Positive for fatigue. Negative for activity change, appetite change and chills.   HENT: Negative.    Eyes: Negative.    Respiratory: Negative for chest tightness and shortness of breath.    Cardiovascular: Positive for leg swelling. Negative for palpitations.   Gastrointestinal: Positive for abdominal distention, abdominal pain and constipation. Negative for nausea and vomiting.   Genitourinary: Negative.    Musculoskeletal: Negative for arthralgias. "   Skin: Negative.    Allergic/Immunologic: Positive for immunocompromised state.   Neurological: Negative.    Psychiatric/Behavioral: Positive for confusion and decreased concentration. The patient is nervous/anxious.      Objective:     Vital Signs (Most Recent):  Temp: 97.8 °F (36.6 °C) (05/29/18 1300)  Pulse: (!) 111 (05/29/18 1600)  Resp: 18 (05/29/18 1600)  BP: (!) 151/89 (05/29/18 1600)  SpO2: 100 % (05/29/18 1213) Vital Signs (24h Range):  Temp:  [97.6 °F (36.4 °C)-98.6 °F (37 °C)] 97.8 °F (36.6 °C)  Pulse:  [] 111  Resp:  [16-20] 18  SpO2:  [95 %-100 %] 100 %  BP: (135-164)/() 151/89     Weight: 65.2 kg (143 lb 11.8 oz)  Body mass index is 23.2 kg/m².    Intake/Output - Last 3 Shifts       05/27 0700 - 05/28 0659 05/28 0700 - 05/29 0659 05/29 0700 - 05/30 0659    P.O.  700     Blood  483.3     Other  375     Total Intake(mL/kg)  1558.3 (23.9)     Urine (mL/kg/hr)  227 (0.1)     Emesis/NG output  0 (0)     Other  576 (0.4)     Stool  0 (0)     Blood  0 (0)     Total Output   803      Net   +755.3             Urine Occurrence  5 x     Stool Occurrence  0 x 0 x    Emesis Occurrence  0 x           Physical Exam   Constitutional: He is oriented to person, place, and time. Vital signs are normal. He appears well-developed and well-nourished.   Chronic ill appearing    HENT:   Head: Normocephalic and atraumatic.   Right Ear: Hearing, tympanic membrane and ear canal normal.   Left Ear: Hearing, tympanic membrane and ear canal normal.   Nose: Nose normal.   Mouth/Throat: Uvula is midline.   Eyes: Pupils are equal, round, and reactive to light.   Neck: Trachea normal, normal range of motion and full passive range of motion without pain. No JVD present.   Cardiovascular: Normal rate, regular rhythm, normal heart sounds and normal pulses.    No murmur heard.  Pulmonary/Chest: Effort normal and breath sounds normal. No respiratory distress. He has no wheezes. He has no rales. He exhibits no tenderness.  "  Diminished at bases.    Abdominal: Soft. Normal appearance and bowel sounds are normal. He exhibits distension. He exhibits no ascites. There is tenderness. There is no rigidity, no rebound, no guarding and no CVA tenderness.   Musculoskeletal: Normal range of motion. He exhibits edema (BLE +2-3). He exhibits no tenderness.   Neurological: He is alert and oriented to person, place, and time. He has normal reflexes.   Skin: Skin is warm and dry.   R IJ permacath   Psychiatric: He has a normal mood and affect. His speech is normal and behavior is normal. Judgment and thought content normal. Cognition and memory are normal.       Laboratory:  Immunosuppressants     None        CBC:   Recent Labs  Lab 05/29/18  0350   WBC 5.23   RBC 2.25*   HGB 7.1*   HCT 22.5*   PLT 23*   *   MCH 31.6*   MCHC 31.6*     CMP:   Recent Labs  Lab 05/29/18  0350      CALCIUM 8.6*   ALBUMIN 2.5*   PROT 5.7*      K 5.0  5.0   CO2 22*      BUN 84*   CREATININE 3.4*   ALKPHOS 95   ALT 12   AST 17   BILITOT 1.5*     Labs within the past 24 hours have been reviewed.    Diagnostic Results:  pertinent imaging reviewed    Assessment/Plan:     * Abdominal pain    - admit with abdominal pain  - denies N/V  - xray with significant stool, no obstruction noted  - LFTs stable  - CT A/P with ascites, anasarca, and moderate stool. No other significant/acute findings noted.   - lactulose, suppository, and kayexalate given without response. Brown bomb enema given this morning (5/29) resulting in small-medium BM.          Constipation    - patient with chronic constipation, no BM x3 days  - stool softeners ordered  - lactulose, suppository, and kayexalate given without response. Brown bomb enema given this morning (5/29) resulting in small-medium BM.  - see above for further details.         Acute confusional state    - Pt continues with cognitive slowing, confusion, being "off", twitchy mouth movements, and slight hand tremors   - " has conversations that are appropriate in context with inappropriate responses   - CT negative for acute processes  - Bcx NGTD, UA clean, UCx pending.  - Cont hold cyclo for now  - jerky movements could be related to reglan (?tardive dyskinesias), will hold        Liver transplanted    - S/p liver transplant with multiple admissions and last ERCP 5/4   - stent in place per imaging  - LFTs stable  - monitor         Long-term use of immunosuppressant medication    - Pt on cyclo and steroids. Continue to monitor for therapeutic levels and toxicities. Adjust doses accordingly.  - will hold cyclo for mental status changes          Prophylactic immunotherapy    - please see long term IS        At risk for opportunistic infections    - pentamidine for pcp prophylaxis (last treatment 5/21)  - valcyte on hold as neutropenic, continue surveillance CMV PCR ( last checked 5/22, undetected). 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  Per hematology this may be reactive to underlying organ dysfunction, drug and or peripheral destruction (specifically referring to platelets  - HIT panel negative previously.   - Per previous Heme recommendations on last admit, cont with steroids  - restarted cyclosporine 5/21  - hematology re consulted this admission, appreciate recs and assistance.    - continue steroids   - transfuse for PLTS <10  - received 1 pack PLTs 5/28 d/t PLT count of 9. Responded well.           Anemia of chronic renal failure    - h/h chronically low but stable  - monitor cbc daily  - hematology following,  appreciate assistance           ESRD Monday/Friday    - nephrology following  - outpatient HD MF schedule  - patient to HD 5/28, but adamant about being taken off after 45 minutes.     -with hyperkalemia d/t incomplete HD overnight. Given insulin/dextrose, calcium gluconate, diuretics, and bicarb with improvement.  - Pt to HD again today for electrolyte and toxin clearance and volume removal.             VTE Risk Mitigation         Ordered     Place ISMA hose  Until discontinued      05/27/18 2019     Place sequential compression device  Until discontinued      05/27/18 2019     Reason for No Pharmacological VTE Prophylaxis  Once      05/27/18 2019     IP VTE HIGH RISK PATIENT  Once      05/27/18 2019          The patients clinical status was discussed at multidisplinary rounds, involving transplant surgery, transplant medicine, pharmacy, nursing, nutrition, and social work    Discharge Planning: Not a candidate for d/c at this time.     Fransisca Thomas PA-C  Liver Transplant  [facility]

## 2018-05-29 NOTE — NURSING
Pt up ambulating in jenkins with mother. RN explained to patient that he would be going to dialysis in about 45mins to an hour and that he would get his pain medicine right before going to dialysis. Pt adamant he already went to dialysis. , mother and RN explained to patient that he hasn't yet gone to dialysis but that he had his enema. Pt confused.

## 2018-05-30 PROBLEM — R45.89 ANXIETY ABOUT HEALTH: Status: ACTIVE | Noted: 2018-05-30

## 2018-05-30 PROBLEM — F41.9 ANXIETY: Status: ACTIVE | Noted: 2018-05-30

## 2018-05-30 PROBLEM — F41.8 ANXIETY ABOUT HEALTH: Status: ACTIVE | Noted: 2018-05-30

## 2018-05-30 PROBLEM — F05 ACUTE HYPERACTIVE DELIRIUM DUE TO MULTIPLE ETIOLOGIES: Status: ACTIVE | Noted: 2018-05-30

## 2018-05-30 LAB
ALBUMIN SERPL BCP-MCNC: 2.3 G/DL
ALP SERPL-CCNC: 93 U/L
ALT SERPL W/O P-5'-P-CCNC: 8 U/L
ANION GAP SERPL CALC-SCNC: 9 MMOL/L
ANISOCYTOSIS BLD QL SMEAR: SLIGHT
AST SERPL-CCNC: 13 U/L
BACTERIA UR CULT: NORMAL
BACTERIA UR CULT: NORMAL
BASOPHILS # BLD AUTO: 0 K/UL
BASOPHILS NFR BLD: 0 %
BILIRUB SERPL-MCNC: 1.2 MG/DL
BLD PROD TYP BPU: NORMAL
BLOOD UNIT EXPIRATION DATE: NORMAL
BLOOD UNIT TYPE CODE: 5100
BLOOD UNIT TYPE: NORMAL
BUN SERPL-MCNC: 50 MG/DL
CALCIUM SERPL-MCNC: 8.2 MG/DL
CHLORIDE SERPL-SCNC: 108 MMOL/L
CMV DNA SERPL NAA+PROBE-ACNC: NORMAL IU/ML
CO2 SERPL-SCNC: 25 MMOL/L
CODING SYSTEM: NORMAL
CREAT SERPL-MCNC: 2.7 MG/DL
CYCLOSPORINE BLD LC/MS/MS-MCNC: 55 NG/ML
DACRYOCYTES BLD QL SMEAR: ABNORMAL
DIFFERENTIAL METHOD: ABNORMAL
DISPENSE STATUS: NORMAL
EOSINOPHIL # BLD AUTO: 0 K/UL
EOSINOPHIL NFR BLD: 0.2 %
ERYTHROCYTE [DISTWIDTH] IN BLOOD BY AUTOMATED COUNT: 17.2 %
EST. GFR  (AFRICAN AMERICAN): 35.5 ML/MIN/1.73 M^2
EST. GFR  (NON AFRICAN AMERICAN): 30.7 ML/MIN/1.73 M^2
GLUCOSE SERPL-MCNC: 85 MG/DL
HCT VFR BLD AUTO: 20.8 %
HGB BLD-MCNC: 6.7 G/DL
HYPOCHROMIA BLD QL SMEAR: ABNORMAL
IMM GRANULOCYTES # BLD AUTO: 0.03 K/UL
IMM GRANULOCYTES NFR BLD AUTO: 0.7 %
INR PPP: 1
LYMPHOCYTES # BLD AUTO: 1 K/UL
LYMPHOCYTES NFR BLD: 24.4 %
MAGNESIUM SERPL-MCNC: 2.1 MG/DL
MCH RBC QN AUTO: 32.5 PG
MCHC RBC AUTO-ENTMCNC: 32.2 G/DL
MCV RBC AUTO: 101 FL
MONOCYTES # BLD AUTO: 0.5 K/UL
MONOCYTES NFR BLD: 12.1 %
NEUTROPHILS # BLD AUTO: 2.6 K/UL
NEUTROPHILS NFR BLD: 62.6 %
NRBC BLD-RTO: 0 /100 WBC
NUM UNITS TRANS PACKED RBC: NORMAL
PHOSPHATE SERPL-MCNC: 5.5 MG/DL
PLATELET # BLD AUTO: 17 K/UL
PLATELET BLD QL SMEAR: ABNORMAL
PMV BLD AUTO: 11.6 FL
POIKILOCYTOSIS BLD QL SMEAR: ABNORMAL
POLYCHROMASIA BLD QL SMEAR: ABNORMAL
POTASSIUM SERPL-SCNC: 3.9 MMOL/L
PROT SERPL-MCNC: 5.3 G/DL
PROTHROMBIN TIME: 10.9 SEC
RBC # BLD AUTO: 2.06 M/UL
SODIUM SERPL-SCNC: 142 MMOL/L
WBC # BLD AUTO: 4.14 K/UL

## 2018-05-30 PROCEDURE — 25000003 PHARM REV CODE 250: Mod: NTX | Performed by: STUDENT IN AN ORGANIZED HEALTH CARE EDUCATION/TRAINING PROGRAM

## 2018-05-30 PROCEDURE — 99233 SBSQ HOSP IP/OBS HIGH 50: CPT | Mod: NTX,,, | Performed by: PHYSICIAN ASSISTANT

## 2018-05-30 PROCEDURE — 85610 PROTHROMBIN TIME: CPT | Mod: NTX

## 2018-05-30 PROCEDURE — 84100 ASSAY OF PHOSPHORUS: CPT | Mod: NTX

## 2018-05-30 PROCEDURE — 25000003 PHARM REV CODE 250: Mod: NTX | Performed by: PHYSICIAN ASSISTANT

## 2018-05-30 PROCEDURE — 36415 COLL VENOUS BLD VENIPUNCTURE: CPT | Mod: NTX

## 2018-05-30 PROCEDURE — P9016 RBC LEUKOCYTES REDUCED: HCPCS | Mod: NTX

## 2018-05-30 PROCEDURE — 25000003 PHARM REV CODE 250: Mod: NTX | Performed by: NURSE PRACTITIONER

## 2018-05-30 PROCEDURE — 80158 DRUG ASSAY CYCLOSPORINE: CPT | Mod: NTX

## 2018-05-30 PROCEDURE — 99222 1ST HOSP IP/OBS MODERATE 55: CPT | Mod: NTX,,, | Performed by: PSYCHIATRY & NEUROLOGY

## 2018-05-30 PROCEDURE — 83735 ASSAY OF MAGNESIUM: CPT | Mod: NTX

## 2018-05-30 PROCEDURE — 63600175 PHARM REV CODE 636 W HCPCS: Mod: NTX | Performed by: STUDENT IN AN ORGANIZED HEALTH CARE EDUCATION/TRAINING PROGRAM

## 2018-05-30 PROCEDURE — 85025 COMPLETE CBC W/AUTO DIFF WBC: CPT | Mod: NTX

## 2018-05-30 PROCEDURE — 36430 TRANSFUSION BLD/BLD COMPNT: CPT | Mod: NTX

## 2018-05-30 PROCEDURE — 20600001 HC STEP DOWN PRIVATE ROOM: Mod: NTX

## 2018-05-30 PROCEDURE — 80053 COMPREHEN METABOLIC PANEL: CPT | Mod: NTX

## 2018-05-30 RX ORDER — PSEUDOEPHEDRINE/ACETAMINOPHEN 30MG-500MG
100 TABLET ORAL ONCE
Status: COMPLETED | OUTPATIENT
Start: 2018-05-30 | End: 2018-05-30

## 2018-05-30 RX ORDER — HYDROCODONE BITARTRATE AND ACETAMINOPHEN 500; 5 MG/1; MG/1
TABLET ORAL
Status: DISCONTINUED | OUTPATIENT
Start: 2018-05-30 | End: 2018-05-31

## 2018-05-30 RX ORDER — PSEUDOEPHEDRINE/ACETAMINOPHEN 30MG-500MG
100 TABLET ORAL
Status: COMPLETED | OUTPATIENT
Start: 2018-05-30 | End: 2018-05-30

## 2018-05-30 RX ORDER — SYRING-NEEDL,DISP,INSUL,0.3 ML 29 G X1/2"
296 SYRINGE, EMPTY DISPOSABLE MISCELLANEOUS ONCE
Status: COMPLETED | OUTPATIENT
Start: 2018-05-30 | End: 2018-05-30

## 2018-05-30 RX ORDER — SYRING-NEEDL,DISP,INSUL,0.3 ML 29 G X1/2"
296 SYRINGE, EMPTY DISPOSABLE MISCELLANEOUS
Status: COMPLETED | OUTPATIENT
Start: 2018-05-30 | End: 2018-05-30

## 2018-05-30 RX ORDER — RISPERIDONE 0.5 MG/1
0.5 TABLET ORAL NIGHTLY
Status: DISCONTINUED | OUTPATIENT
Start: 2018-05-30 | End: 2018-05-31

## 2018-05-30 RX ADMIN — FLUCONAZOLE 200 MG: 200 TABLET ORAL at 07:05

## 2018-05-30 RX ADMIN — GABAPENTIN 100 MG: 100 CAPSULE ORAL at 08:05

## 2018-05-30 RX ADMIN — URSODIOL 300 MG: 300 CAPSULE ORAL at 08:05

## 2018-05-30 RX ADMIN — SEVELAMER CARBONATE 1600 MG: 800 TABLET, FILM COATED ORAL at 07:05

## 2018-05-30 RX ADMIN — Medication 100 ML: at 08:05

## 2018-05-30 RX ADMIN — LEVETIRACETAM 500 MG: 500 TABLET ORAL at 08:05

## 2018-05-30 RX ADMIN — MAGESIUM CITRATE 296 ML: 1.75 LIQUID ORAL at 08:05

## 2018-05-30 RX ADMIN — SEVELAMER CARBONATE 1600 MG: 800 TABLET, FILM COATED ORAL at 05:05

## 2018-05-30 RX ADMIN — AMLODIPINE BESYLATE 5 MG: 5 TABLET ORAL at 07:05

## 2018-05-30 RX ADMIN — SEVELAMER CARBONATE 1600 MG: 800 TABLET, FILM COATED ORAL at 12:05

## 2018-05-30 RX ADMIN — MAGESIUM CITRATE 296 ML: 1.75 LIQUID ORAL at 03:05

## 2018-05-30 RX ADMIN — URSODIOL 300 MG: 300 CAPSULE ORAL at 07:05

## 2018-05-30 RX ADMIN — Medication 100 ML: at 03:05

## 2018-05-30 RX ADMIN — OXYCODONE HYDROCHLORIDE 5 MG: 5 TABLET ORAL at 04:05

## 2018-05-30 RX ADMIN — OXYCODONE HYDROCHLORIDE 5 MG: 5 TABLET ORAL at 07:05

## 2018-05-30 RX ADMIN — OXYCODONE HYDROCHLORIDE 5 MG: 5 TABLET ORAL at 09:05

## 2018-05-30 RX ADMIN — DOCUSATE SODIUM 100 MG: 100 CAPSULE, LIQUID FILLED ORAL at 07:05

## 2018-05-30 RX ADMIN — LEVETIRACETAM 500 MG: 500 TABLET ORAL at 07:05

## 2018-05-30 RX ADMIN — DOCUSATE SODIUM 100 MG: 100 CAPSULE, LIQUID FILLED ORAL at 08:05

## 2018-05-30 RX ADMIN — DRONABINOL 2.5 MG: 2.5 CAPSULE ORAL at 08:05

## 2018-05-30 RX ADMIN — RISPERIDONE 0.5 MG: 0.5 TABLET ORAL at 08:05

## 2018-05-30 RX ADMIN — FAMOTIDINE 20 MG: 20 TABLET ORAL at 08:05

## 2018-05-30 RX ADMIN — LEVOTHYROXINE SODIUM 75 MCG: 75 TABLET ORAL at 06:05

## 2018-05-30 RX ADMIN — PREDNISONE 60 MG: 10 TABLET ORAL at 07:05

## 2018-05-30 RX ADMIN — BISACODYL 10 MG: 5 TABLET, COATED ORAL at 07:05

## 2018-05-30 RX ADMIN — SODIUM CHLORIDE 500 ML: 0.9 INJECTION, SOLUTION INTRAVENOUS at 07:05

## 2018-05-30 NOTE — ASSESSMENT & PLAN NOTE
"- Pt admitted with cognitive slowing, confusion, being "off", twitchy mouth movements, and slight hand tremors  - CT negative for acute processes  - Bcx NGTD, UA clean, UCx pending.  - Cont hold cyclo for now  - jerky movements could be related to reglan (?tardive dyskinesias), will hold  - mental status much improved today. Less sluggish with appropriate responses. Still remains tearful and emotional regarding future/current health situation   - dependent on mom and becomes emotional when talking about all she has done to help him   - pt requested psych consult for establishment of relationship for outpatient psychotherapy. Consult placed. appreciate assistance.      "

## 2018-05-30 NOTE — ASSESSMENT & PLAN NOTE
Patient with waxing and waning orientation, decreased sleep and significant confusion and mood lability per RN and mother. Patient with BUN 84 5/29 and did subsequently complete HD session which may be responsible for improvement seen today though BUN remains elevated at 50. Patient also on multiple deliriogenic medications including Keppra, Famotidine, Marinol and prednisone.  - Recommend initiating Risperdal 0.5 mg PO qHS. QTc 429 on 5/27, recommend recheck EKG in AM to follow QTc.  - Recommend Ramelteon 8 mg PO qHS PRN insomnia to assist with sleep wake cycle  - May use Zyprexa 5 mg PO (Zydis disintegrating tablet)/IM q8 hours PRN nonredirectable agitation.   · DELIRIUM BEHAVIOR MANAGEMENT  · PLEASE utilize CHEMICAL restraints with PRN meds first for agitation. Minimize use of PHYSICAL restraints  · Keep window shades open and room lit during day and room dim at night in order to promote normal sleep-wake cycles  · Encourage family at bedside. [place] patient often to situation, location, date.  · Continue to Limit or Discontinue use of Narcotics, Benzos and Anti-cholinergic medications as they may worsen delirium.  · Continue medical workup for causative etiology of Delirium. CTH negative, infectious work up thus far negative.

## 2018-05-30 NOTE — ASSESSMENT & PLAN NOTE
- nephrology following  - outpatient HD MF schedule  - patient to HD 5/28, but adamant about being taken off after 45 minutes.     -with hyperkalemia d/t incomplete HD overnight. Given insulin/dextrose, calcium gluconate, diuretics, and bicarb with improvement.  - Pt to HD again 5/29 for electrolyte and toxin clearance and volume removal.   - restart epo for HD days
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
## 2018-05-30 NOTE — PHYSICIAN QUERY
PT Name: Jhonny Diana  MR #: [id number]     Physician Query Form - Etiology of Condition Clarification      CDS/: Judie Sumner                 Contact information:[email or web address]  This form is a permanent document in the medical record.     Query Date: May 30, 2018    By submitting this query, we are merely seeking further clarification of documentation.  Please utilize your independent clinical judgment when addressing the question(s) below.     The medical record contains the following:    Findings Supporting Clinical Information Location in Medical Record   Abdominal pain  Abdominal pain     - admit with abdominal pain   - denies N/V   - xray with significant stool, no obstruction noted   - LFTs stable   - CT A/P with ascites, anasarca, and moderate stool. No other significant/acute findings noted.   - lactulose, suppository, and kayexalate given without response. Brown bomb enema given 5/29 resulting in small-medium BM.   - pain slightly improved today                Constipation     - patient with chronic constipation, no BM x3 days   - stool softeners ordered   - lactulose, suppository, and kayexalate given without response. Brown bomb enema given 5/29 resulting in small-medium BM x2.   - see above for further details.         Liver transplant note 5-30     Please document your best medical opinion regarding the etiology of Abdominal pain for which the primary focus of treatment is/was directed.           [     ]  Slow transit constipation      [     ]  Other;please specify            [x    ]  Clinically Undetermined    Please document in your progress notes daily for the duration of treatment, until resolved, and include in your discharge summary.
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
## 2018-05-30 NOTE — ASSESSMENT & PLAN NOTE
- patient with chronic constipation, no BM x3 days  - stool softeners ordered  - lactulose, suppository, and kayexalate given without response. Brown bomb enema given 5/29 resulting in small-medium BM x2.  - see above for further details.

## 2018-05-30 NOTE — ASSESSMENT & PLAN NOTE
- admit with abdominal pain  - denies N/V  - xray with significant stool, no obstruction noted  - LFTs stable  - CT A/P with ascites, anasarca, and moderate stool. No other significant/acute findings noted.   - lactulose, suppository, and kayexalate given without response. Brown bomb enema given 5/29 resulting in small-medium BM.  - pain slightly improved today

## 2018-05-30 NOTE — MEDICAL/APP STUDENT
5/30/2018 12:12 PM   Jhonny Diana   1990   [id number]  DATE OF ADMISSION: 5/27/2018  4:48 PM           PSYCHIATRY CONSULT NOTE      BRIEF HPI   Chief Complaint /Reason for Consult: anxiety and confusion     Patient is a 26 yo M with a past medical history of liver transplant complicated by biliary stricture.  He has been re-admitted numerous times in the last year. He presented to the ED on 5/27/2018 for worsening abdominal pain and distention.  The patient has a past psychiatric history of anxiety when he was 16 yo. At that time he was placed on Prozac but did not tolerate the side effects. Patient started becoming confused and overwhelmed on 5/28/2018 during HD treatment. Yesterday, the patient had periods of confusion with AAO x2 and had delayed responses. Patient believed that he had already completed certians tasks during the day. Patient had a BUN 84 prior to agitated episode yesterday prior to dialysis that could contribute to a delirious state.       ASSESSMENT     Delirium  - patient appears to have a waxing and waning course of confusion  - patient reports decreased concentration   - BUN 84 prior to agitated episode yesterday prior to dialysis     RECOMMENDATIONS      · PSYCH Meds- Switch Keppra to Valproic acid 250 mg q8h    · Legal- N/A  Dispo-Seek / Does not meet criteria for Inpt admission until stabilization of psychiatric symptoms.    · The above will be discussed with staff psychiatrist, and update any changes after rounds.  · The above will be discussed with staff psychiatrist and update any changes after rounds in the afternoon.  · Thank you for this consult will continue to follow      ----------------------------------------------------------------------------------------------------------------------  SUBJECTIVE:       History of Present Illness:   Jhonny Diana is a 27 y.o. male with past psychiatric history of anxiety, currently presenting with Abdominal pain.  Psychiatry was originally  consulted to address the patient's symptoms of anxiety and confusion. Patient started becoming confused and overwhelmed on 5/28/2018 during HD treatment. Yesterday, the patient had periods of confusion with AAO x2 and had delayed responses. Patient believed that he had already completed certians tasks during the day. Patient had a BUN 84 prior to agitated episode yesterday prior to dialysis that could contribute to a delirious state.     Collateral:  Mother was present for part of the patient interview and stated that the patient seemed much better today.  She has noticed that the patient has been having decreased sleep for the past 2 weeks.      SUBJECTIVE:   Currently, the patient is endorsing the following: The patient states that he has been feeling down about not knowing what will happen in regards to his health in the future.  The patient became tearful when discussing his medical problems.  He looks forward to getting out of the hospital to read, write, and play video games.  The patients states that he has had anxiety and hopeless since his liver transplant 8 months ago.  He feels most hopeless when receiving hemodialysis.  The patient denies suicidal ideation.  The patient has had decreased sleep for the past 2 weeks and often wakes up at night with SOB and palpitation.  He has also been recently having nightmares at night.      Psychiatric Review Of Systems - Is patient experiencing or having changes in:  sleep: yes, decreased ability to fall asleep and wakes up at night  appetite: no  weight: no  energy/anergy: yes, feeling more fatigue   interest/pleasure/anhedonia: no  somatic symptoms: no, patient says that he has been feeling anxious since his liver transplant 8 months ago  libido: no  guilty/hopelessness: yes, unsure of what will happen in the future with his medical conditions  concentration: yes, decreased concentration   S.I.B.s/risky behavior: no  SI/SA:  no    anxiety/panic: yes, has been  feeling constantly anxious since his liver transplant 8 months ago  Agoraphobia:  no  Social phobia:  no  Recurrent nightmares:  yes, starting having nightmares 8 months ago but can't remember what the nightmares are about  hyper startle response:  no  Avoidance: no  Recurrent thoughts:  no  Recurrent behaviors:  no    Irritability: no  Racing thoughts: no  Impulsive behaviors: no  Pressured speech:  no    Paranoia:no  Delusions: no  AVH:no    Past Medical History:   Diagnosis Date    Alcoholic hepatitis with ascites     Alcoholic hepatitis with ascites     ESRD on dialysis     History of hematemesis 9/28/2017    Hypertension     Renal disorder     Seizures        Past Surgical History:   Procedure Laterality Date    APPENDECTOMY      ERCP      ESOPHAGOGASTRODUODENOSCOPY      LIVER TRANSPLANT         Social History     Social History    Marital status: Single     Spouse name: N/A    Number of children: N/A    Years of education: N/A     Social History Main Topics    Smoking status: Former Smoker     Packs/day: 1.00     Years: 10.00     Types: Cigarettes     Start date: 2/19/2017    Smokeless tobacco: Never Used    Alcohol use No      Comment: a fifth of liquor daily for years, cut back over last 2 months    Drug use: No    Sexual activity: Not Asked     Other Topics Concern    None     Social History Narrative    None       Current Facility-Administered Medications   Medication Dose Route Frequency Provider Last Rate Last Dose    0.9%  NaCl infusion (for blood administration)   Intravenous Q24H PRN Fransisca Thomas PA-C        0.9%  NaCl infusion (for blood administration)   Intravenous Q24H PRN Fransisca Thomas PA-C        0.9%  NaCl infusion   Intravenous PRN Kay Everett Guillen, NP        0.9%  NaCl infusion   Intravenous PRN Kay Everett Guillen, NP        0.9%  NaCl infusion   Intravenous PRN Kay Everett Guillen, NP        acetaminophen tablet 650 mg  650 mg Oral Q4H PRN Imer Culp MD         amLODIPine tablet 5 mg  5 mg Oral Daily Imer Culp MD   5 mg at 05/30/18 0753    bisacodyl EC tablet 10 mg  10 mg Oral Daily Imer Culp MD   10 mg at 05/30/18 0753    calcium gluconate 1g in dextrose 5% 100mL (ready to mix system)  1 g Intravenous Q10 Min PRN Shereen Mcguire DNP        dextrose 50% injection 12.5 g  12.5 g Intravenous PRN Imer Culp MD        diphenhydrAMINE injection 25 mg  25 mg Intravenous Q4H PRN Imer Culp MD        docusate sodium capsule 100 mg  100 mg Oral BID Imer Culp MD   100 mg at 05/30/18 0753    dronabinol capsule 2.5 mg  2.5 mg Oral QHS Imer Culp MD   2.5 mg at 05/29/18 2131    [START ON 6/1/2018] epoetin maurisio injection 20,000 Units  20,000 Units Subcutaneous Every Mon, Fri Fransisca Thomas PA-C        famotidine tablet 20 mg  20 mg Oral QHS Imer Culp MD   20 mg at 05/29/18 2131    fluconazole tablet 200 mg  200 mg Oral Daily Fransisca Thomas PA-C   200 mg at 05/30/18 0754    gabapentin capsule 100 mg  100 mg Oral QHS Imer Culp MD   100 mg at 05/29/18 2131    gentamicin injection 80 mg  80 mg Intra-Catheter PRN Kay Guillen, NP   40 mg at 05/29/18 1653    glucagon (human recombinant) injection 1 mg  1 mg Intramuscular PRN Imer Culp MD        levETIRAcetam tablet 500 mg  500 mg Oral BID Imer Culp MD   500 mg at 05/30/18 0754    levothyroxine tablet 75 mcg  75 mcg Oral Before breakfast Imer Culp MD   75 mcg at 05/30/18 0655    omnipaque oral solution 15 mL  15 mL Oral PRN Brandon Long MD   15 mL at 05/28/18 1555    ondansetron disintegrating tablet 8 mg  8 mg Oral Q8H PRN Imer Culp MD        oxyCODONE immediate release tablet 5 mg  5 mg Oral Q4H PRN Imer Culp MD   5 mg at 05/30/18 0716    predniSONE tablet 60 mg  60 mg Oral Daily Imer Culp MD   60 mg at 05/30/18 0754    ramelteon tablet 8 mg  8 mg Oral Nightly PRN Imer Culp MD        sevelamer  carbonate tablet 1,600 mg  1,600 mg Oral TID  Fransisca Thomas PA-C   1,600 mg at 05/30/18 1206    sodium chloride 0.9% flush 3 mL  3 mL Intravenous PRN Imer Culp MD        ursodiol capsule 300 mg  300 mg Oral BID Imer Culp MD   300 mg at 05/30/18 0754       Review of patient's allergies indicates:   Allergen Reactions    Bactrim [sulfamethoxazole-trimethoprim] Other (See Comments)     Mookie Trell Syndrome       Scheduled Meds:   amLODIPine  5 mg Oral Daily    bisacodyl  10 mg Oral Daily    docusate sodium  100 mg Oral BID    dronabinol  2.5 mg Oral QHS    [START ON 6/1/2018] epoetin maurisio  20,000 Units Subcutaneous Every Mon, Fri    famotidine  20 mg Oral QHS    fluconazole  200 mg Oral Daily    gabapentin  100 mg Oral QHS    levETIRAcetam  500 mg Oral BID    levothyroxine  75 mcg Oral Before breakfast    predniSONE  60 mg Oral Daily    sevelamer carbonate  1,600 mg Oral TID WM    ursodiol  300 mg Oral BID     sodium chloride, sodium chloride, sodium chloride 0.9%, sodium chloride 0.9%, sodium chloride 0.9%, acetaminophen, [COMPLETED] calcium gluconate IVPB **AND** calcium gluconate IVPB, dextrose 50%, diphenhydrAMINE, gentamicin, glucagon (human recombinant), omnipaque, ondansetron, oxyCODONE, ramelteon, sodium chloride 0.9%  Psychotherapeutics     Start     Stop Route Frequency Ordered    05/27/18 2116  ramelteon tablet 8 mg      -- Oral Nightly PRN 05/27/18 2019          Past Psychiatric History:  Previous Medication Trials: yes, patient was on Prozac for anxiety at 60yo but stopped using because of side effects (sexual dysfunction)    Previous Psychiatric Hospitalizations: no   Previous Suicide Attempts: no   History of Violence: unknown  Outpatient Psychiatrist: unknown    Social History:  Marital Status: single  Children: did not ask    Employment Status/Info: unknown  Education: college graduate  Special Ed: no  :  Confucianist:   Housing Status:   Hobbies/Leisure  time:  History of phys/sexual abuse: no  Access to gun: no    Family Psychiatric History:     Substance Abuse History:  Recreational Drugs: none  Use of Alcohol: Previously drank prior to liver transplant 8 months ago, used to drink a fifth of whiskey a day. Patient had withdraw symptoms including shaking, anxious, and decreased function.  Rehab History:no   Tobacco Use:Previously smoked a pack a day prior to liver transplant 8 months ago.   Use of Caffeine: did not ask  Use of OTC: denies  Legal consequences of chemical use: no    Legal History:  Past Charges/Incarcerations:no   Pending charges:no     Psychosocial Stressors: health.   Functioning Relationships: good relationship with mother  Strengths AND Liabilities  Strength: Patient accepts guidance/feedback, Strength: Patient is expressive/articulate., Strength: Patient has reasonable judgment., Liability: Patient has poor health.    Psychosocial Factors:  Maladaptive or problem behaviors: previously heavy alcohol consumption  Peer group, social, ethic, cultural, emotional, and health factors: patient has poor health and awaiting for a kidney transplant   Living situation, family constellation, family circumstances/home: did not ask  Recovery environment: did not ask  Community resources used by patient: no  Treatment acceptance/motivation for change: yes    Is the patient aware of the biomedical complications associated with substance abuse and mental illness? Did not ask    Does the patient have an Advance Directive for Mental Health treatment? unknown   - if yes, inform patient to bring copy.      OBJECTIVE:     Vitals:    05/30/18 1054   BP: 137/75   Pulse: 90   Resp: 16   Temp: 99.2 °F (37.3 °C)           Recent Labs  Lab 05/30/18  0538   GLU 85   CALCIUM 8.2*   ALBUMIN 2.3*   PROT 5.3*      K 3.9   CO2 25      BUN 50*   CREATININE 2.7*   ALKPHOS 93   ALT 8*   AST 13   BILITOT 1.2*     Lab Results   Component Value Date    WBC 4.14 05/30/2018     HGB 6.7 (L) 05/30/2018    HCT 20.8 (L) 05/30/2018     (H) 05/30/2018    PLT 17 (LL) 05/30/2018     Lab Results   Component Value Date     05/30/2018    BUN 50 (H) 05/30/2018    CREATININE 2.7 (H) 05/30/2018    TSH 3.089 05/09/2018    WBC 4.14 05/30/2018     No results found for: PHENYTOIN, PHENOBARB, VALPROATE, CBMZ  Urinalysis  No results for input(s): COLORU, CLARITYU, SPECGRAV, PHUR, PROTEINUA, GLUCOSEU, BILIRUBINCON, BLOODU, WBCU, RBCU, BACTERIA, MUCUS, NITRITE, LEUKOCYTESUR, UROBILINOGEN, HYALINECASTS in the last 24 hours.    Recent Labs  Lab 05/29/18  1604   POCTGLUCOSE 149*       Medical ROS  General ROS: fatigue negative for - chills or fever  Respiratory ROS: no cough, shortness of breath, or wheezing  Cardiovascular ROS: no chest pain or dyspnea on exertion  Gastrointestinal ROS: no abdominal pain, change in bowel habits, or black or bloody stools  Musculoskeletal ROS: negative for - gait disturbance, joint stiffness, muscle pain or muscular weakness  Neurological ROS: negative for - confusion, dizziness, gait disturbance, headaches, impaired coordination/balance, seizures or visual changes    Mental Status Exam:  Appearance: age appropriate, lying in bed, thin  Behavior/Cooperation: friendly and cooperative, eye contact normal  Speech: appropriate rate, volume and tone normal tone, normal rate, normal pitch, normal volume, spontaneous  Language: grossly intact, able to name with spontaneous speech  Mood: sad  Affect:  congruent with mood and appropriate to situation/content   Thought Process: normal and logical  Thought Content: normal, no suicidality, no homicidality, delusions, or paranoia  Sensorium:  Awake  Alert and Oriented: x3 grossly intact, person, place, situation, month of year, year  Memory: 3/3 immediate, 3/3 at 5 minutes    Recent:  Intact; able to report recent events   Remote:  Limited; Named 2/4 past presidents   Attention/concentration: Able to count in 7's from 100 to 96  and 86   Similarities:  Not tested   Abstract reasoning:  Intact  Insight:  Intact  Judgment: Intact    [facility] ICU- NEGATIVE      Nicole Clifford, M3  5/30/2018 12:12 PM

## 2018-05-30 NOTE — SUBJECTIVE & OBJECTIVE
Patient History           Medical as of 5/30/2018     Past Medical History     Diagnosis Date Comments Source    Alcoholic hepatitis with ascites -- -- Provider    Alcoholic hepatitis with ascites -- -- Provider    ESRD on dialysis -- -- Provider    History of hematemesis 9/28/2017 -- Provider    Hypertension -- -- Provider    Renal disorder -- -- Provider    Seizures -- -- Provider          Pertinent Negatives     Diagnosis Date Noted Comments Source    Diabetes mellitus 1/17/2018 -- Provider                  Surgical as of 5/30/2018     Past Surgical History     Procedure Laterality Date Comments Source    APPENDECTOMY -- -- -- Provider    LIVER TRANSPLANT -- -- -- Provider    ESOPHAGOGASTRODUODENOSCOPY -- -- -- Provider    ERCP -- -- -- Provider                  Family as of 5/30/2018     Problem Relation Name Age of Onset Comments Source    No Known Problems Mother -- -- -- Provider            Tobacco Use as of 5/30/2018     Smoking Status Smoking Start Date Smoking Quit Date Packs/day Years Used    Former Smoker 2/19/2017 -- 1.00 10.00    Types Comments Smokeless Tobacco Status Smokeless Tobacco Quit Date Source     Cigarettes -- Never Used -- Provider            Alcohol Use as of 5/30/2018     Alcohol Use Drinks/Week Alcohol/Week Comments Source    No -- -- a fifth of liquor daily for years, cut back over last 2 months Provider            Drug Use as of 5/30/2018     Drug Use Types Frequency Comments Source    No -- -- -- Provider            Sexual Activity as of 5/30/2018     Sexually Active Birth Control Partners Comments Source    -- -- -- -- Provider            Activities of Daily Living as of 5/30/2018    **None**           Social Documentation as of 5/30/2018    **None**           Occupational as of 5/30/2018    **None**           Socioeconomic as of 5/30/2018     Marital Status Spouse Name Number of Children Years Education Preferred Language Ethnicity Race Source    Single -- -- -- English  /White White --         Pertinent History Q A Comments    as of 5/30/2018 Lives with      Place in Birth Order      Lives in      Number of Siblings      Raised by      Legal Involvement      Childhood Trauma      Criminal History of      Financial Status      Highest Level of Education      Does patient have access to a firearm?       Service      Primary Leisure Activity      Spirituality       Past Medical History:   Diagnosis Date    Alcoholic hepatitis with ascites     Alcoholic hepatitis with ascites     ESRD on dialysis     History of hematemesis 9/28/2017    Hypertension     Renal disorder     Seizures      Past Surgical History:   Procedure Laterality Date    APPENDECTOMY      ERCP      ESOPHAGOGASTRODUODENOSCOPY      LIVER TRANSPLANT       Family History     Problem Relation (Age of Onset)    No Known Problems Mother        Social History Main Topics    Smoking status: Former Smoker     Packs/day: 1.00     Years: 10.00     Types: Cigarettes     Start date: 2/19/2017    Smokeless tobacco: Never Used    Alcohol use No      Comment: a fifth of liquor daily for years, cut back over last 2 months    Drug use: No    Sexual activity: Not on file     Review of patient's allergies indicates:   Allergen Reactions    Bactrim [sulfamethoxazole-trimethoprim] Other (See Comments)     Mookie Trell Syndrome       No current facility-administered medications on file prior to encounter.      Current Outpatient Prescriptions on File Prior to Encounter   Medication Sig    amLODIPine (NORVASC) 5 MG tablet Take 1 tablet (5 mg total) by mouth once daily.    bisacodyl (DULCOLAX) 5 mg EC tablet Take 2 tablets (10 mg total) by mouth once daily.    cycloSPORINE modified, NEORAL, (NEORAL) 100 MG capsule Take 1 capsule (100 mg total) by mouth 2 (two) times daily.    cycloSPORINE modified, NEORAL, 25 MG capsule Take 1 capsule (25 mg total) by mouth 2 (two) times daily. Total 125mg twice daily.     dronabinol (MARINOL) 2.5 MG capsule Take 1 capsule (2.5 mg total) by mouth every evening.    ergocalciferol (ERGOCALCIFEROL) 50,000 unit Cap Take 1 capsule (50,000 Units total) by mouth every 7 days.    famotidine (PEPCID) 20 MG tablet Take 1 tablet (20 mg total) by mouth every evening.    fluconazole (DIFLUCAN) 200 MG Tab Take 1 tablet (200 mg total) by mouth once daily. STOP 6/23/18    gabapentin (NEURONTIN) 100 MG capsule Take 1 capsule (100 mg total) by mouth every evening.    levETIRAcetam (KEPPRA) 500 MG Tab Take 1 tablet (500 mg total) by mouth 2 (two) times daily.    levothyroxine (SYNTHROID) 75 MCG tablet Take 1 tablet (75 mcg total) by mouth before breakfast.    metoclopramide HCl (REGLAN) 10 MG tablet Take 1 tablet (10 mg total) by mouth 4 (four) times daily before meals and nightly.    oxyCODONE (ROXICODONE) 10 mg Tab immediate release tablet Take 0.5-1 tablets (5-10 mg total) by mouth every 4 (four) hours as needed for Pain.    predniSONE (DELTASONE) 20 MG tablet Take daily by mouth: 60mg 5/21-5/27, 50mg (2.5 tab) 5/28-6/3, 40mg (2 tab) 6/4-6/10, 30mg (1.5 tab) 6/11-6/17, 20mg (1 tab) 6/18-6/24, then 10mg (0.5 tab) daily beginning 6/25    sevelamer carbonate (RENVELA) 800 mg Tab Take 1 tablet (800 mg total) by mouth 3 (three) times daily with meals.    ursodiol (ACTIGALL) 300 mg capsule Take 1 capsule (300 mg total) by mouth 2 (two) times daily.    docusate sodium (COLACE) 100 MG capsule Take 1 capsule (100 mg total) by mouth 2 (two) times daily.    ondansetron (ZOFRAN-ODT) 8 MG TbDL Take 1 tablet (8 mg total) by mouth every 8 (eight) hours as needed (nausea).     Psychotherapeutics     Start     Stop Route Frequency Ordered    05/27/18 2116  ramelteon tablet 8 mg      -- Oral Nightly PRN 05/27/18 2019        Review of Systems  Strengths and Liabilities: Strength: Patient has positive support network.    Objective:     Vital Signs (Most Recent):  Temp: 99 °F (37.2 °C) (05/30/18  "1331)  Pulse: 87 (05/30/18 1331)  Resp: 16 (05/30/18 1331)  BP: (!) 147/90 (05/30/18 1331)  SpO2: 98 % (05/30/18 1331) Vital Signs (24h Range):  Temp:  [98 °F (36.7 °C)-99.2 °F (37.3 °C)] 99 °F (37.2 °C)  Pulse:  [] 87  Resp:  [16-20] 16  SpO2:  [96 %-100 %] 98 %  BP: (132-163)/(75-96) 147/90     Height: 5' 6" (167.6 cm)  Weight: 65.2 kg (143 lb 11.8 oz)  Body mass index is 23.2 kg/m².      Intake/Output Summary (Last 24 hours) at 05/30/18 1430  Last data filed at 05/30/18 1048   Gross per 24 hour   Intake              750 ml   Output             2483 ml   Net            -1733 ml       Physical Exam    Mental Status Exam:  Appearance: age appropriate, lying in recliner, thin  Behavior/Cooperation: friendly and cooperative, eye contact normal  Speech: appropriate rate, volume and tone normal tone, normal rate, normal pitch, normal volume, spontaneous  Language: grossly intact, able to name with spontaneous speech  Mood: "sad"  Affect:  tearful at times, appropriate to situation/content   Thought Process: normal and logical  Thought Content: normal, no suicidality, no homicidality, delusions, or paranoia  Sensorium:  Awake, Alert  Alert and Oriented: awake, oriented to person, place, situation, month, year  Memory: 3/3 immediate, 3/3 at 5 minutes   Fund of Knowledge: Named 2/4 past presidents  Attention/concentration: SAVEAHAART with 0 errors, serial 7's: 93, 86 but with significant delay   Abstract reasoning:  Intact  Insight:  limited  Judgment: appropriate for circumstances    CAM ICU- NEGATIVE    Significant Labs:   Last 24 Hours:   Recent Lab Results       05/30/18  0538 05/29/18  1604      Immature Granulocytes 0.7(H)      Immature Grans (Abs) 0.03  Comment:  Mild elevation in immature granulocytes is non specific and   can be seen in a variety of conditions including stress response,   acute inflammation, trauma and pregnancy. Correlation with other   laboratory and clinical findings is essential.        " Albumin 2.3(L)      Alkaline Phosphatase 93      ALT 8(L)      Anion Gap 9      Aniso Slight      AST 13      Baso # 0.00      Basophil% 0.0      Total Bilirubin 1.2  Comment:  For infants and newborns, interpretation of results should be based  on gestational age, weight and in agreement with clinical  observations.  Premature Infant recommended reference ranges:  Up to 24 hours.............<8.0 mg/dL  Up to 48 hours............<12.0 mg/dL  3-5 days..................<15.0 mg/dL  6-29 days.................<15.0 mg/dL  (H)      BUN, Bld 50(H)      Calcium 8.2(L)      Chloride 108      CO2 25      Creatinine 2.7(H)      Cyclosporine, LC/MS 55  Comment:  Reference Normals:  For Kidney Transplants: 100-300 ng/mL  Testing performed by Liquid Chromatography-Tandem  Mass Spectrometry (LC-MS/MS).  This test was developed and its performance characteristics  determined by [facility], Department of Pathology  and Laboratory Medicine in a manner consistent with CLIA  requirements. It has not been cleared or approved by the [place]  Food and Drug Administration.  This test is used for clinical  purposes.  It should not be regarded as investigational or for  research.  (L)      Differential Method Automated      eGFR if  35.5(A)      eGFR if non  30.7  Comment:  Calculation used to obtain the estimated glomerular filtration  rate (eGFR) is the CKD-EPI equation.   (A)      Eos # 0.0      Eosinophil% 0.2      Glucose 85      Gran # (ANC) 2.6      Gran% 62.6      Hematocrit 20.8(L)      Hemoglobin 6.7(L)      Hypo Occasional      Coumadin Monitoring INR 1.0  Comment:  Coumadin Therapy:  2.0 - 3.0 for INR for all indicators except mechanical heart valves  and antiphospholipid syndromes which should use 2.5 - 3.5.        Lymph # 1.0      Lymph% 24.4      Magnesium 2.1      MCH 32.5(H)      MCHC 32.2      (H)      Mono # 0.5      Mono% 12.1      MPV 11.6      nRBC 0      Phosphorus 5.5(H)       Platelet Estimate Decreased(A)      Platelets 17  Comment:  PLT COUNT   critical result(s) called and verbal readback obtained   from JUAN ARMSTRONG RN, 05/30/2018 08:35  (LL)      POCT Glucose  149(H)     Poik Moderate      Poly Occasional      Potassium 3.9      Total Protein 5.3(L)      Protime 10.9      RBC 2.06(L)      RDW 17.2(H)      Sodium 142      Tear Drop Cells Occasional      WBC 4.14            Significant Imaging: I have reviewed all pertinent imaging results/findings within the past 24 hours.

## 2018-05-30 NOTE — ASSESSMENT & PLAN NOTE
- h/h chronically low, decreased today  - transfuse 1 unit PRBC  - restart Epo on MW (HD days)  - monitor cbc daily  - hematology following, appreciate assistance

## 2018-05-30 NOTE — PROGRESS NOTES
[facility]  Liver Transplant  Progress Note    Patient Name: Jhonny Diana  MRN: [id number]  Admission Date: 2018  Hospital Length of Stay: 3 days  Code Status: Full Code  Primary Care Provider: Primary Doctor No  Post-Operative Day: 223    ORGAN:   LIVER  Disease Etiology: Acute Alcoholic Hepatitis  Donor Type:    - Brain Death  CDC High Risk:   No  Donor CMV Status:   Donor CMV Status: Positive  Donor HBcAB:   Negative  Donor HCV Status:   Negative  Whole or Partial: Whole Liver  Biliary Anastomosis: End to End  Arterial Anatomy: Standard  Subjective:     History of Present Illness:  Mr. 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  S/p OLTX 10/19/2017 (Liver) with steroid induction. Post op course with multiple complications to include biliary stricture, FUO, ESRD, and chronic abdominal pain. Recently discharged on  after workup for abdominal pain and thrombocytopenia. He presented  with worsening abdominal pain since Friday and distention. Denies nausea, vomiting, sob, cp, fevers, or any other new or concerning symptoms. Reports no BM x3 days.        Hospital Course:  Hospital Course: Patient with persistent abdominal pain and no BM despite lactulose and suppository. Unable to tolerate HD d/t agitation and abdominal discomfort- requested to be taken off. Potassium elevated since HD not completed. Shifted with insulin/dextrose, calcium gluc, bicarb, and diuretics.  Potassium improved following. Patient tearful and overwhelmed. Still with some abnormal mouth and hand twitching, odd comments, and confusion. Able to toelrate HD on  with reassurance and assistance from his mom.     Interval history: No acute events overnight, patient reports minimal sleep. Able to tolerate HD yesterday with reassurance, coaxing, and assistance from his mother.  Mental status improved this morning- he is less sluggish, quicker to respond, and responding appropriately. Although  patient remains emotional and tearful. Will consult rahul per patient request- he would like to establish relationship for outpatient therapy. CT head negative for acute processes. Bcx NGTD, UA clean, Ucx nothing significant. Will continue to hold cyclosporine for now. Hematology following. Retic and LDH improved from last admission. PLTs 17k today. H/H decreased. Will restart epo and give 1 unit PRBC today.     Scheduled Meds:   amLODIPine  5 mg Oral Daily    bisacodyl  10 mg Oral Daily    docusate sodium  100 mg Oral BID    dronabinol  2.5 mg Oral QHS    [START ON 6/1/2018] epoetin maurisio  20,000 Units Subcutaneous Every Mon, Fri    famotidine  20 mg Oral QHS    fluconazole  200 mg Oral Daily    gabapentin  100 mg Oral QHS    levETIRAcetam  500 mg Oral BID    levothyroxine  75 mcg Oral Before breakfast    predniSONE  60 mg Oral Daily    sevelamer carbonate  1,600 mg Oral TID WM    ursodiol  300 mg Oral BID     Continuous Infusions:  PRN Meds:sodium chloride, sodium chloride, sodium chloride 0.9%, sodium chloride 0.9%, sodium chloride 0.9%, acetaminophen, [COMPLETED] calcium gluconate IVPB **AND** calcium gluconate IVPB, dextrose 50%, diphenhydrAMINE, gentamicin, glucagon (human recombinant), omnipaque, ondansetron, oxyCODONE, ramelteon, sodium chloride 0.9%    Review of Systems   Constitutional: Positive for fatigue. Negative for activity change, appetite change and chills.   HENT: Negative.    Eyes: Negative.    Respiratory: Negative for chest tightness and shortness of breath.    Cardiovascular: Positive for leg swelling. Negative for palpitations.   Gastrointestinal: Positive for abdominal distention, abdominal pain and constipation. Negative for nausea and vomiting.   Genitourinary: Negative.    Musculoskeletal: Negative for arthralgias.   Skin: Negative.    Allergic/Immunologic: Positive for immunocompromised state.   Neurological: Negative.    Psychiatric/Behavioral: Positive for confusion,  decreased concentration and sleep disturbance. The patient is nervous/anxious.      Objective:     Vital Signs (Most Recent):  Temp: 99 °F (37.2 °C) (05/30/18 1331)  Pulse: 87 (05/30/18 1331)  Resp: 16 (05/30/18 1331)  BP: (!) 147/90 (05/30/18 1331)  SpO2: 98 % (05/30/18 1331) Vital Signs (24h Range):  Temp:  [98 °F (36.7 °C)-99.2 °F (37.3 °C)] 99 °F (37.2 °C)  Pulse:  [] 87  Resp:  [16-20] 16  SpO2:  [96 %-100 %] 98 %  BP: (132-164)/(75-99) 147/90     Weight: 65.2 kg (143 lb 11.8 oz)  Body mass index is 23.2 kg/m².    Intake/Output - Last 3 Shifts       05/28 0700 - 05/29 0659 05/29 0700 - 05/30 0659 05/30 0700 - 05/31 0659    P.O. 700 100     Blood 483.3  350    Other 375 300     Total Intake(mL/kg) 1558.3 (23.9) 400 (6.1) 350 (5.4)    Urine (mL/kg/hr) 227 (0.1) 0 (0)     Emesis/NG output 0 (0) 0 (0)     Other 576 (0.4) 2483 (1.6)     Stool 0 (0) 0 (0)     Blood 0 (0) 0 (0)     Total Output 803 2483      Net +755.3 -2083 +350           Urine Occurrence 5 x 0 x     Stool Occurrence 0 x 1 x 0 x    Emesis Occurrence 0 x 0 x           Physical Exam   Constitutional: He is oriented to person, place, and time. Vital signs are normal. He appears well-developed and well-nourished.   Chronic ill appearing    HENT:   Head: Normocephalic and atraumatic.   Right Ear: Hearing, tympanic membrane and ear canal normal.   Left Ear: Hearing, tympanic membrane and ear canal normal.   Nose: Nose normal.   Mouth/Throat: Uvula is midline.   Eyes: Pupils are equal, round, and reactive to light.   Neck: Trachea normal, normal range of motion and full passive range of motion without pain. No JVD present.   Cardiovascular: Normal rate, regular rhythm, normal heart sounds and normal pulses.    No murmur heard.  Pulmonary/Chest: Effort normal and breath sounds normal. No respiratory distress. He has no wheezes. He has no rales. He exhibits no tenderness.   Diminished at bases.    Abdominal: Soft. Normal appearance and bowel sounds are  "normal. He exhibits distension. He exhibits no ascites. There is tenderness. There is no rigidity, no rebound, no guarding and no CVA tenderness.   Musculoskeletal: Normal range of motion. He exhibits edema (BLE ). He exhibits no tenderness.   Neurological: He is alert and oriented to person, place, and time. He has normal reflexes.   Skin: Skin is warm and dry.   R IJ permacath   Psychiatric: He has a normal mood and affect. His speech is normal and behavior is normal. Judgment and thought content normal. Cognition and memory are normal.       Laboratory:  Immunosuppressants     None        CBC:   Recent Labs  Lab 05/30/18  0538   WBC 4.14   RBC 2.06*   HGB 6.7*   HCT 20.8*   PLT 17*   *   MCH 32.5*   MCHC 32.2     CMP:   Recent Labs  Lab 05/30/18  0538   GLU 85   CALCIUM 8.2*   ALBUMIN 2.3*   PROT 5.3*      K 3.9   CO2 25      BUN 50*   CREATININE 2.7*   ALKPHOS 93   ALT 8*   AST 13   BILITOT 1.2*     Labs within the past 24 hours have been reviewed.    Diagnostic Results:  pertinent iamging reviewed    Assessment/Plan:     * Abdominal pain    - admit with abdominal pain  - denies N/V  - xray with significant stool, no obstruction noted  - LFTs stable  - CT A/P with ascites, anasarca, and moderate stool. No other significant/acute findings noted.   - lactulose, suppository, and kayexalate given without response. Brown bomb enema given 5/29 resulting in small-medium BM.  - pain slightly improved today          Constipation    - patient with chronic constipation, no BM x3 days  - stool softeners ordered  - lactulose, suppository, and kayexalate given without response. Brown bomb enema given 5/29 resulting in small-medium BM x2.  - see above for further details.         Anxiety    - Pt admitted with cognitive slowing, confusion, being "off", twitchy mouth movements, and slight hand tremors  - CT negative for acute processes  - Bcx NGTD, UA clean, UCx pending.  - Cont hold cyclo for now  - jerky " movements could be related to reglan (?tardive dyskinesias), will hold  - mental status much improved today. Less sluggish with appropriate responses. Still remains tearful and emotional regarding future/current health situation   - dependent on mom and becomes emotional when talking about all she has done to help him   - pt requested psych consult for establishment of relationship for outpatient psychotherapy. Consult placed. appreciate assistance.            S/P liver transplant    - S/p liver transplant with multiple admissions and last ERCP 5/4   - stent in place per imaging  - LFTs stable  - monitor         Long-term use of immunosuppressant medication    - Pt on cyclo and steroids. Continue to monitor for therapeutic levels and toxicities. Adjust doses accordingly.  - will hold cyclo for mental status changes          Prophylactic immunotherapy    - please see long term IS        At risk for opportunistic infections    - pentamidine for pcp prophylaxis (last treatment 5/21)  - valcyte on hold as neutropenic, continue surveillance CMV PCR ( last checked 5/22, undetected). 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  Per hematology this may be reactive to underlying organ dysfunction, drug and or peripheral destruction (specifically referring to platelets  - HIT panel negative previously.   - Per previous Heme recommendations on last admit, cont with steroids  - restarted cyclosporine 5/21  - hematology re consulted this admission, appreciate recs and assistance.    - continue steroids   - transfuse for  PLTS <10  - received 1 pack PLTs 5/28 d/t PLT count of 9. Responded well.           Anemia of chronic renal failure    - h/h chronically low, decreased today  - transfuse 1 unit PRBC  - restart Epo on MW (HD days)  - monitor cbc daily  - hematology following, appreciate assistance           ESRD Monday/Friday    - nephrology following  - outpatient HD MF schedule  - patient to HD 5/28, but adamant about being taken off after 45 minutes.     -with hyperkalemia d/t incomplete HD overnight. Given insulin/dextrose, calcium gluconate, diuretics, and bicarb with improvement.  - Pt to HD again 5/29 for electrolyte and toxin clearance and volume removal.   - restart epo for HD days            VTE Risk Mitigation         Ordered     Place ISMA hose  Until discontinued      05/27/18 2019     Place sequential compression device  Until discontinued      05/27/18 2019     Reason for No Pharmacological VTE Prophylaxis  Once      05/27/18 2019     IP VTE HIGH RISK PATIENT  Once      05/27/18 2019          The patients clinical status was discussed at multidisplinary rounds, involving transplant surgery, transplant medicine, pharmacy, nursing, nutrition, and social work    Discharge Planning:  Monitor kidney function --> may need outpt HD  Monitor HH needs vs rehab for deconditioned status      Fransisca Thomas PA-C  Liver Transplant  [facility]

## 2018-05-30 NOTE — NURSING
Pt completed brown bomb enema with minimal to no relief of abdominal pain. Previous attempt this afternoon with medium BM but no abdominal pain relief. Pt requesting another Brown Bomb. Qi SAMREEN notified. Another brown bomb ordered.

## 2018-05-30 NOTE — SUBJECTIVE & OBJECTIVE
Scheduled Meds:   amLODIPine  5 mg Oral Daily    bisacodyl  10 mg Oral Daily    docusate sodium  100 mg Oral BID    dronabinol  2.5 mg Oral QHS    [START ON 6/1/2018] epoetin maurisio  20,000 Units Subcutaneous Every Mon, Fri    famotidine  20 mg Oral QHS    fluconazole  200 mg Oral Daily    gabapentin  100 mg Oral QHS    levETIRAcetam  500 mg Oral BID    levothyroxine  75 mcg Oral Before breakfast    predniSONE  60 mg Oral Daily    sevelamer carbonate  1,600 mg Oral TID WM    ursodiol  300 mg Oral BID     Continuous Infusions:  PRN Meds:sodium chloride, sodium chloride, sodium chloride 0.9%, sodium chloride 0.9%, sodium chloride 0.9%, acetaminophen, [COMPLETED] calcium gluconate IVPB **AND** calcium gluconate IVPB, dextrose 50%, diphenhydrAMINE, gentamicin, glucagon (human recombinant), omnipaque, ondansetron, oxyCODONE, ramelteon, sodium chloride 0.9%    Review of Systems   Constitutional: Positive for fatigue. Negative for activity change, appetite change and chills.   HENT: Negative.    Eyes: Negative.    Respiratory: Negative for chest tightness and shortness of breath.    Cardiovascular: Positive for leg swelling. Negative for palpitations.   Gastrointestinal: Positive for abdominal distention, abdominal pain and constipation. Negative for nausea and vomiting.   Genitourinary: Negative.    Musculoskeletal: Negative for arthralgias.   Skin: Negative.    Allergic/Immunologic: Positive for immunocompromised state.   Neurological: Negative.    Psychiatric/Behavioral: Positive for confusion, decreased concentration and sleep disturbance. The patient is nervous/anxious.      Objective:     Vital Signs (Most Recent):  Temp: 99 °F (37.2 °C) (05/30/18 1331)  Pulse: 87 (05/30/18 1331)  Resp: 16 (05/30/18 1331)  BP: (!) 147/90 (05/30/18 1331)  SpO2: 98 % (05/30/18 1331) Vital Signs (24h Range):  Temp:  [98 °F (36.7 °C)-99.2 °F (37.3 °C)] 99 °F (37.2 °C)  Pulse:  [] 87  Resp:  [16-20] 16  SpO2:  [96 %-100  %] 98 %  BP: (132-164)/(75-99) 147/90     Weight: 65.2 kg (143 lb 11.8 oz)  Body mass index is 23.2 kg/m².    Intake/Output - Last 3 Shifts       05/28 0700 - 05/29 0659 05/29 0700 - 05/30 0659 05/30 0700 - 05/31 0659    P.O. 700 100     Blood 483.3  350    Other 375 300     Total Intake(mL/kg) 1558.3 (23.9) 400 (6.1) 350 (5.4)    Urine (mL/kg/hr) 227 (0.1) 0 (0)     Emesis/NG output 0 (0) 0 (0)     Other 576 (0.4) 2483 (1.6)     Stool 0 (0) 0 (0)     Blood 0 (0) 0 (0)     Total Output 803 2483      Net +755.3 -2083 +350           Urine Occurrence 5 x 0 x     Stool Occurrence 0 x 1 x 0 x    Emesis Occurrence 0 x 0 x           Physical Exam   Constitutional: He is oriented to person, place, and time. Vital signs are normal. He appears well-developed and well-nourished.   Chronic ill appearing    HENT:   Head: Normocephalic and atraumatic.   Right Ear: Hearing, tympanic membrane and ear canal normal.   Left Ear: Hearing, tympanic membrane and ear canal normal.   Nose: Nose normal.   Mouth/Throat: Uvula is midline.   Eyes: Pupils are equal, round, and reactive to light.   Neck: Trachea normal, normal range of motion and full passive range of motion without pain. No JVD present.   Cardiovascular: Normal rate, regular rhythm, normal heart sounds and normal pulses.    No murmur heard.  Pulmonary/Chest: Effort normal and breath sounds normal. No respiratory distress. He has no wheezes. He has no rales. He exhibits no tenderness.   Diminished at bases.    Abdominal: Soft. Normal appearance and bowel sounds are normal. He exhibits distension. He exhibits no ascites. There is tenderness. There is no rigidity, no rebound, no guarding and no CVA tenderness.   Musculoskeletal: Normal range of motion. He exhibits edema (BLE ). He exhibits no tenderness.   Neurological: He is alert and oriented to person, place, and time. He has normal reflexes.   Skin: Skin is warm and dry.   R IJ permacath   Psychiatric: He has a normal mood and  affect. His speech is normal and behavior is normal. Judgment and thought content normal. Cognition and memory are normal.       Laboratory:  Immunosuppressants     None        CBC:   Recent Labs  Lab 05/30/18  0538   WBC 4.14   RBC 2.06*   HGB 6.7*   HCT 20.8*   PLT 17*   *   MCH 32.5*   MCHC 32.2     CMP:   Recent Labs  Lab 05/30/18  0538   GLU 85   CALCIUM 8.2*   ALBUMIN 2.3*   PROT 5.3*      K 3.9   CO2 25      BUN 50*   CREATININE 2.7*   ALKPHOS 93   ALT 8*   AST 13   BILITOT 1.2*     Labs within the past 24 hours have been reviewed.    Diagnostic Results:  pertinent iamging reviewed

## 2018-05-31 LAB
ALBUMIN SERPL BCP-MCNC: 2.5 G/DL
ALP SERPL-CCNC: 101 U/L
ALT SERPL W/O P-5'-P-CCNC: 12 U/L
ANION GAP SERPL CALC-SCNC: 11 MMOL/L
ANISOCYTOSIS BLD QL SMEAR: SLIGHT
AST SERPL-CCNC: 18 U/L
BASOPHILS # BLD AUTO: 0 K/UL
BASOPHILS NFR BLD: 0 %
BILIRUB SERPL-MCNC: 1.4 MG/DL
BUN SERPL-MCNC: 64 MG/DL
BURR CELLS BLD QL SMEAR: ABNORMAL
CALCIUM SERPL-MCNC: 8.3 MG/DL
CHLORIDE SERPL-SCNC: 107 MMOL/L
CO2 SERPL-SCNC: 21 MMOL/L
CREAT SERPL-MCNC: 3 MG/DL
CYCLOSPORINE BLD LC/MS/MS-MCNC: 22 NG/ML
DACRYOCYTES BLD QL SMEAR: ABNORMAL
DIFFERENTIAL METHOD: ABNORMAL
EOSINOPHIL # BLD AUTO: 0 K/UL
EOSINOPHIL NFR BLD: 0.2 %
ERYTHROCYTE [DISTWIDTH] IN BLOOD BY AUTOMATED COUNT: 16.3 %
EST. GFR  (AFRICAN AMERICAN): 31.2 ML/MIN/1.73 M^2
EST. GFR  (NON AFRICAN AMERICAN): 27 ML/MIN/1.73 M^2
GLUCOSE SERPL-MCNC: 83 MG/DL
HCT VFR BLD AUTO: 24.3 %
HGB BLD-MCNC: 7.7 G/DL
HYPOCHROMIA BLD QL SMEAR: ABNORMAL
IMM GRANULOCYTES # BLD AUTO: 0.04 K/UL
IMM GRANULOCYTES NFR BLD AUTO: 0.8 %
INR PPP: 1
LYMPHOCYTES # BLD AUTO: 0.9 K/UL
LYMPHOCYTES NFR BLD: 18.2 %
MAGNESIUM SERPL-MCNC: 1.9 MG/DL
MCH RBC QN AUTO: 32.2 PG
MCHC RBC AUTO-ENTMCNC: 31.7 G/DL
MCV RBC AUTO: 102 FL
MONOCYTES # BLD AUTO: 0.6 K/UL
MONOCYTES NFR BLD: 11.5 %
NEUTROPHILS # BLD AUTO: 3.5 K/UL
NEUTROPHILS NFR BLD: 69.3 %
NRBC BLD-RTO: 0 /100 WBC
OVALOCYTES BLD QL SMEAR: ABNORMAL
PHOSPHATE SERPL-MCNC: 5.5 MG/DL
PLATELET # BLD AUTO: 18 K/UL
PMV BLD AUTO: 12.7 FL
POIKILOCYTOSIS BLD QL SMEAR: SLIGHT
POLYCHROMASIA BLD QL SMEAR: ABNORMAL
POTASSIUM SERPL-SCNC: 4 MMOL/L
PROT SERPL-MCNC: 5.8 G/DL
PROTHROMBIN TIME: 10.5 SEC
RBC # BLD AUTO: 2.39 M/UL
SCHISTOCYTES BLD QL SMEAR: ABNORMAL
SODIUM SERPL-SCNC: 139 MMOL/L
WBC # BLD AUTO: 5.11 K/UL

## 2018-05-31 PROCEDURE — 36415 COLL VENOUS BLD VENIPUNCTURE: CPT | Mod: NTX

## 2018-05-31 PROCEDURE — 25000003 PHARM REV CODE 250: Mod: NTX | Performed by: NURSE PRACTITIONER

## 2018-05-31 PROCEDURE — 84100 ASSAY OF PHOSPHORUS: CPT | Mod: NTX

## 2018-05-31 PROCEDURE — 99239 HOSP IP/OBS DSCHRG MGMT >30: CPT | Mod: NTX,,, | Performed by: PHYSICIAN ASSISTANT

## 2018-05-31 PROCEDURE — 99232 SBSQ HOSP IP/OBS MODERATE 35: CPT | Mod: NTX,,, | Performed by: PSYCHIATRY & NEUROLOGY

## 2018-05-31 PROCEDURE — 85610 PROTHROMBIN TIME: CPT | Mod: NTX

## 2018-05-31 PROCEDURE — 80158 DRUG ASSAY CYCLOSPORINE: CPT | Mod: NTX

## 2018-05-31 PROCEDURE — 93010 ELECTROCARDIOGRAM REPORT: CPT | Mod: NTX,,, | Performed by: INTERNAL MEDICINE

## 2018-05-31 PROCEDURE — 80053 COMPREHEN METABOLIC PANEL: CPT | Mod: NTX

## 2018-05-31 PROCEDURE — 25000003 PHARM REV CODE 250: Mod: NTX | Performed by: PHYSICIAN ASSISTANT

## 2018-05-31 PROCEDURE — 85025 COMPLETE CBC W/AUTO DIFF WBC: CPT | Mod: NTX

## 2018-05-31 PROCEDURE — 63600175 PHARM REV CODE 636 W HCPCS: Mod: NTX | Performed by: STUDENT IN AN ORGANIZED HEALTH CARE EDUCATION/TRAINING PROGRAM

## 2018-05-31 PROCEDURE — 25000003 PHARM REV CODE 250: Mod: NTX | Performed by: STUDENT IN AN ORGANIZED HEALTH CARE EDUCATION/TRAINING PROGRAM

## 2018-05-31 PROCEDURE — 20600001 HC STEP DOWN PRIVATE ROOM: Mod: NTX

## 2018-05-31 PROCEDURE — 93005 ELECTROCARDIOGRAM TRACING: CPT | Mod: NTX

## 2018-05-31 PROCEDURE — 83735 ASSAY OF MAGNESIUM: CPT | Mod: NTX

## 2018-05-31 RX ORDER — SODIUM CHLORIDE 9 MG/ML
INJECTION, SOLUTION INTRAVENOUS ONCE
Status: DISCONTINUED | OUTPATIENT
Start: 2018-05-31 | End: 2018-06-04 | Stop reason: HOSPADM

## 2018-05-31 RX ORDER — POLYETHYLENE GLYCOL 3350 17 G/17G
17 POWDER, FOR SOLUTION ORAL DAILY
Status: DISCONTINUED | OUTPATIENT
Start: 2018-05-31 | End: 2018-06-04

## 2018-05-31 RX ORDER — SODIUM CHLORIDE 9 MG/ML
INJECTION, SOLUTION INTRAVENOUS
Status: DISCONTINUED | OUTPATIENT
Start: 2018-05-31 | End: 2018-06-01

## 2018-05-31 RX ORDER — SYRING-NEEDL,DISP,INSUL,0.3 ML 29 G X1/2"
296 SYRINGE, EMPTY DISPOSABLE MISCELLANEOUS ONCE
Status: COMPLETED | OUTPATIENT
Start: 2018-05-31 | End: 2018-05-31

## 2018-05-31 RX ORDER — MORPHINE SULFATE 2 MG/ML
2 INJECTION, SOLUTION INTRAMUSCULAR; INTRAVENOUS ONCE
Status: COMPLETED | OUTPATIENT
Start: 2018-06-01 | End: 2018-05-31

## 2018-05-31 RX ORDER — SYRING-NEEDL,DISP,INSUL,0.3 ML 29 G X1/2"
296 SYRINGE, EMPTY DISPOSABLE MISCELLANEOUS
Status: DISCONTINUED | OUTPATIENT
Start: 2018-05-31 | End: 2018-06-01

## 2018-05-31 RX ORDER — OXYCODONE HYDROCHLORIDE 5 MG/1
5 TABLET ORAL ONCE
Status: COMPLETED | OUTPATIENT
Start: 2018-05-31 | End: 2018-05-31

## 2018-05-31 RX ORDER — PSEUDOEPHEDRINE/ACETAMINOPHEN 30MG-500MG
100 TABLET ORAL
Status: DISCONTINUED | OUTPATIENT
Start: 2018-05-31 | End: 2018-06-01

## 2018-05-31 RX ORDER — RISPERIDONE 1 MG/1
1 TABLET ORAL NIGHTLY
Status: DISCONTINUED | OUTPATIENT
Start: 2018-05-31 | End: 2018-06-04

## 2018-05-31 RX ADMIN — GABAPENTIN 100 MG: 100 CAPSULE ORAL at 09:05

## 2018-05-31 RX ADMIN — PREDNISONE 60 MG: 10 TABLET ORAL at 09:05

## 2018-05-31 RX ADMIN — FAMOTIDINE 20 MG: 20 TABLET ORAL at 09:05

## 2018-05-31 RX ADMIN — URSODIOL 300 MG: 300 CAPSULE ORAL at 09:05

## 2018-05-31 RX ADMIN — LEVOTHYROXINE SODIUM 75 MCG: 75 TABLET ORAL at 05:05

## 2018-05-31 RX ADMIN — RISPERIDONE 1 MG: 1 TABLET ORAL at 09:05

## 2018-05-31 RX ADMIN — DOCUSATE SODIUM 100 MG: 100 CAPSULE, LIQUID FILLED ORAL at 09:05

## 2018-05-31 RX ADMIN — BISACODYL 10 MG: 5 TABLET, COATED ORAL at 09:05

## 2018-05-31 RX ADMIN — MAGESIUM CITRATE 296 ML: 1.75 LIQUID ORAL at 06:05

## 2018-05-31 RX ADMIN — SEVELAMER CARBONATE 1600 MG: 800 TABLET, FILM COATED ORAL at 05:05

## 2018-05-31 RX ADMIN — OXYCODONE HYDROCHLORIDE 5 MG: 5 TABLET ORAL at 11:05

## 2018-05-31 RX ADMIN — LEVETIRACETAM 500 MG: 500 TABLET ORAL at 09:05

## 2018-05-31 RX ADMIN — MORPHINE SULFATE 2 MG: 2 INJECTION, SOLUTION INTRAMUSCULAR; INTRAVENOUS at 10:05

## 2018-05-31 RX ADMIN — OXYCODONE HYDROCHLORIDE 5 MG: 5 TABLET ORAL at 03:05

## 2018-05-31 RX ADMIN — OXYCODONE HYDROCHLORIDE 5 MG: 5 TABLET ORAL at 07:05

## 2018-05-31 RX ADMIN — SEVELAMER CARBONATE 1600 MG: 800 TABLET, FILM COATED ORAL at 08:05

## 2018-05-31 RX ADMIN — POLYETHYLENE GLYCOL 3350 17 G: 17 POWDER, FOR SOLUTION ORAL at 12:05

## 2018-05-31 RX ADMIN — DRONABINOL 2.5 MG: 2.5 CAPSULE ORAL at 09:05

## 2018-05-31 RX ADMIN — SEVELAMER CARBONATE 1600 MG: 800 TABLET, FILM COATED ORAL at 12:05

## 2018-05-31 RX ADMIN — FLUCONAZOLE 200 MG: 200 TABLET ORAL at 09:05

## 2018-05-31 RX ADMIN — AMLODIPINE BESYLATE 5 MG: 5 TABLET ORAL at 09:05

## 2018-05-31 NOTE — NURSING
RN observed list of numbers ranging from 75 to 260 on pt's white board; also observed pt leaving restroom and writing down an amount.  RN asked if these numbers represent pt's urine output.  Pt stated, yes, it does.  Pt stated he does not know how far back the numbers go nor which numbers, if any, he recorded overnight.  In pt's flowsheets, RN does not see these numbers lining up exactly nor do they come close to the total on the board (less the most recent one the pt recorded with RN in room).  The total is 1355.  Some of this amount may be duplicated in chart.  I&O totals may be off for the last few days.  RN will continue to monitor.    Notified LEEANNE Thomas PA-C, of this situation at 0928.  No new orders received.
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
## 2018-05-31 NOTE — ASSESSMENT & PLAN NOTE
- nephrology following  - outpatient HD MF schedule  - patient to HD 5/28, but adamant about being taken off after 45 minutes.     -with hyperkalemia d/t incomplete HD overnight. Given insulin/dextrose, calcium gluconate, diuretics, and bicarb with improvement.  - Pt to HD again 5/29 for electrolyte and toxin clearance and volume removal.   - restart epo for HD days  - patient with more anxiety/pain at HD, will give extra dose of pain med prior to HD.

## 2018-05-31 NOTE — ASSESSMENT & PLAN NOTE
- h/h chronically low, stable  - received 1 unit PRBC 5/30/18  - restart Epo on MW (HD days)  - monitor cbc daily  - hematology following, appreciate assistance

## 2018-05-31 NOTE — ASSESSMENT & PLAN NOTE
- admit with abdominal pain  - denies N/V  - xray with significant stool, no obstruction noted  - LFTs stable  - CT A/P with ascites, anasarca, and moderate stool. No other significant/acute findings noted.   - lactulose, suppository, and kayexalate given without response. Brown bomb enema given 5/29 resulting in small-medium BM. Given again 5/20 x2 per patient request with BMs noted   - KUB 5/31 still  With significant amounts of stool.   - pain slightly improved today but still present   - will discuss narcotic gut with GI team, will ask about Movantic?

## 2018-05-31 NOTE — ASSESSMENT & PLAN NOTE
Patient with waxing and waning orientation, decreased sleep and significant confusion and mood lability per RN and mother. Patient with BUN 84 5/29 and did subsequently complete HD session which may be responsible for improvement seen 5/30. Patient also on multiple deliriogenic medications including Keppra, Famotidine, Marinol, oxycodone and prednisone.  - Recommend increase Risperdal 1 mg PO qHS. QTc 429 on 5/27, 418 5/31.  - Recommend Ramelteon 8 mg PO qHS PRN insomnia to assist with sleep wake cycle  - May use Zyprexa 5 mg PO (Zydis disintegrating tablet)/IM q8 hours PRN nonredirectable agitation.   · DELIRIUM BEHAVIOR MANAGEMENT  · PLEASE utilize CHEMICAL restraints with PRN meds first for agitation. Minimize use of PHYSICAL restraints  · Keep window shades open and room lit during day and room dim at night in order to promote normal sleep-wake cycles  · Encourage family at bedside. [place] patient often to situation, location, date.  · Continue to Limit or Discontinue use of Narcotics, Benzos and Anti-cholinergic medications as they may worsen delirium.  · Continue medical workup for causative etiology of Delirium. CTH negative, infectious work up thus far negative.

## 2018-05-31 NOTE — PLAN OF CARE
Problem: Patient Care Overview  Goal: Plan of Care Review  Outcome: Ongoing (interventions implemented as appropriate)  Pt AAOx4, VSS, in NAD throughout shift.  Pt of stable mental status throughout shift, with no inappropriate statements or responses.  Pt has gone for long walks with his mother several times so far this shift.  Pt napping during shift but awakes to take medication.  Pt eating well this shift, see flow sheets.  No bowel movement so far; abdomen firm and tender to palpation, see flow sheets for full assessment.  RN administered pain med x1 so far this shift, see MAR and flow sheets.  RN maintaining fall risk precautions throughout shift.  Pt denies pain or other need at this time; RN will continue to monitor, assess, and alter plan of care as needed until report given to oncoming night shift nurse.

## 2018-05-31 NOTE — MEDICAL/APP STUDENT
5/31/2018 9:59 AM   Jhonny Diana   1990   [id number]        PSYCHIATRY CONSULT PROGRESS NOTE       BRIEF HPI     Patient is a 28 yo M with a past medical history of liver transplant complicated by biliary stricture.  He has been re-admitted numerous times in the last year. He presented to the ED on 5/27/2018 for worsening abdominal pain and distention.  The patient has a past psychiatric history of anxiety when he was 14 yo. At that time he was placed on Prozac but did not tolerate the side effects. Patient started becoming confused and overwhelmed on 5/28/2018 during HD treatment. Yesterday, the patient had periods of confusion with AAO x2 and had delayed responses. Patient believed that he had already completed certians tasks during the day. Patient had a BUN 84 prior to agitated episode yesterday prior to dialysis that could contribute to a delirious state.     Interval History:  (5/30/2018): The patient states that he has been feeling down about not knowing what will happen in regards to his health in the future.  The patient became tearful when discussing his medical problems.  He looks forward to getting out of the hospital to read, write, and play video games. He feels most hopeless when receiving hemodialysis.  The patient denies suicidal ideation.  The patient has had decreased sleep for the past 2 weeks and often wakes up at night with SOB and palpitation. Patient had a negative CAM-ICU.    ASSESSMENT     Delirium   - patient reports decreased concentration  - no confusion or agitation overnight  - became emotional in the evening but resolved   - still unable to sleep at night     RECOMMENDATIONS      · PSYCH Meds- increase Risperidone to 1 mg after EKG to monitor for QTc  · Legal status- N/A  · The above will be discussed with staff psychiatrist and update any changes after rounds in the afternoon.  · Thank you for this consult will continue to  follow      ----------------------------------------------------------------------------------------------------------------------  SUBJECTIVE:     Nursing note: Patient wasn't able to sleep last night.  There was no sign of confusion overnight.     Today,  Pt reports that he is in a good mood this morning. The patient appeared to have a flat affect.  Patient was oriented to person, place, time. He had decreased concentration with failure of serial 7's.  His thought content was normal with no depression and SI/HI.  The patient;s thought process was logical and coherent.  The patient does not report hallucinations, illusions, or delusions.  The patient was CAM-ICU negative with passing SAVEAHAART.     Collateral: Mother states that the patient became slightly emotional overnight, but never appeared to be confused.  Her main concern is about his lack of sleep over night and noted his increase in appetite.           Current Medications:   Scheduled Meds:    amLODIPine  5 mg Oral Daily    bisacodyl  10 mg Oral Daily    docusate sodium  100 mg Oral BID    dronabinol  2.5 mg Oral QHS    [START ON 6/1/2018] epoetin maurisio  20,000 Units Subcutaneous Every Mon, Fri    famotidine  20 mg Oral QHS    fluconazole  200 mg Oral Daily    gabapentin  100 mg Oral QHS    levETIRAcetam  500 mg Oral BID    levothyroxine  75 mcg Oral Before breakfast    predniSONE  60 mg Oral Daily    risperiDONE  0.5 mg Oral QHS    sevelamer carbonate  1,600 mg Oral TID WM    sodium chloride 0.9%  500 mL Rectal Once    ursodiol  300 mg Oral BID      PRN Meds: sodium chloride, acetaminophen, [COMPLETED] calcium gluconate IVPB **AND** calcium gluconate IVPB, dextrose 50%, diphenhydrAMINE, gentamicin, glucagon (human recombinant), omnipaque, ondansetron, oxyCODONE, ramelteon, sodium chloride 0.9%   Psychotherapeutics     Start     Stop Route Frequency Ordered    05/30/18 2100  risperiDONE tablet 0.5 mg      -- Oral Nightly 05/30/18 3277     05/27/18 2116  ramelteon tablet 8 mg      -- Oral Nightly PRN 05/27/18 2019          Allergies:   Review of patient's allergies indicates:   Allergen Reactions    Bactrim [sulfamethoxazole-trimethoprim] Other (See Comments)     Mookie Trell Syndrome        Past Medical History:   Diagnosis Date    Alcoholic hepatitis with ascites     Alcoholic hepatitis with ascites     ESRD on dialysis     History of hematemesis 9/28/2017    Hypertension     Renal disorder     Seizures        OBJECTIVE:   Vitals   Vitals:    05/31/18 0744   BP: (!) 122/59   Pulse: 80   Resp: 16   Temp: 98.9 °F (37.2 °C)        Labs/Imaging/Studies:   Recent Results (from the past 36 hour(s))   Comprehensive metabolic panel    Collection Time: 05/30/18  5:38 AM   Result Value Ref Range    Sodium 142 136 - 145 mmol/L    Potassium 3.9 3.5 - 5.1 mmol/L    Chloride 108 95 - 110 mmol/L    CO2 25 23 - 29 mmol/L    Glucose 85 70 - 110 mg/dL    BUN, Bld 50 (H) 6 - 20 mg/dL    Creatinine 2.7 (H) 0.5 - 1.4 mg/dL    Calcium 8.2 (L) 8.7 - 10.5 mg/dL    Total Protein 5.3 (L) 6.0 - 8.4 g/dL    Albumin 2.3 (L) 3.5 - 5.2 g/dL    Total Bilirubin 1.2 (H) 0.1 - 1.0 mg/dL    Alkaline Phosphatase 93 55 - 135 U/L    AST 13 10 - 40 U/L    ALT 8 (L) 10 - 44 U/L    Anion Gap 9 8 - 16 mmol/L    eGFR if African American 35.5 (A) >60 mL/min/1.73 m^2    eGFR if non  30.7 (A) >60 mL/min/1.73 m^2   Magnesium    Collection Time: 05/30/18  5:38 AM   Result Value Ref Range    Magnesium 2.1 1.6 - 2.6 mg/dL   Phosphorus    Collection Time: 05/30/18  5:38 AM   Result Value Ref Range    Phosphorus 5.5 (H) 2.7 - 4.5 mg/dL   CBC auto differential    Collection Time: 05/30/18  5:38 AM   Result Value Ref Range    WBC 4.14 3.90 - 12.70 K/uL    RBC 2.06 (L) 4.60 - 6.20 M/uL    Hemoglobin 6.7 (L) 14.0 - 18.0 g/dL    Hematocrit 20.8 (L) 40.0 - 54.0 %     (H) 82 - 98 fL    MCH 32.5 (H) 27.0 - 31.0 pg    MCHC 32.2 32.0 - 36.0 g/dL    RDW 17.2 (H) 11.5 - 14.5 %     Platelets 17 (LL) 150 - 350 K/uL    MPV 11.6 9.2 - 12.9 fL    Immature Granulocytes 0.7 (H) 0.0 - 0.5 %    Gran # (ANC) 2.6 1.8 - 7.7 K/uL    Immature Grans (Abs) 0.03 0.00 - 0.04 K/uL    Lymph # 1.0 1.0 - 4.8 K/uL    Mono # 0.5 0.3 - 1.0 K/uL    Eos # 0.0 0.0 - 0.5 K/uL    Baso # 0.00 0.00 - 0.20 K/uL    nRBC 0 0 /100 WBC    Gran% 62.6 38.0 - 73.0 %    Lymph% 24.4 18.0 - 48.0 %    Mono% 12.1 4.0 - 15.0 %    Eosinophil% 0.2 0.0 - 8.0 %    Basophil% 0.0 0.0 - 1.9 %    Platelet Estimate Decreased (A)     Aniso Slight     Poik Moderate     Poly Occasional     Hypo Occasional     Tear Drop Cells Occasional     Differential Method Automated    Protime-INR    Collection Time: 05/30/18  5:38 AM   Result Value Ref Range    Prothrombin Time 10.9 9.0 - 12.5 sec    INR 1.0 0.8 - 1.2   Cyclosporine level    Collection Time: 05/30/18  5:38 AM   Result Value Ref Range    Cyclosporine, LC/MS 55 (L) 100 - 400 ng/mL   Comprehensive metabolic panel    Collection Time: 05/31/18  5:04 AM   Result Value Ref Range    Sodium 139 136 - 145 mmol/L    Potassium 4.0 3.5 - 5.1 mmol/L    Chloride 107 95 - 110 mmol/L    CO2 21 (L) 23 - 29 mmol/L    Glucose 83 70 - 110 mg/dL    BUN, Bld 64 (H) 6 - 20 mg/dL    Creatinine 3.0 (H) 0.5 - 1.4 mg/dL    Calcium 8.3 (L) 8.7 - 10.5 mg/dL    Total Protein 5.8 (L) 6.0 - 8.4 g/dL    Albumin 2.5 (L) 3.5 - 5.2 g/dL    Total Bilirubin 1.4 (H) 0.1 - 1.0 mg/dL    Alkaline Phosphatase 101 55 - 135 U/L    AST 18 10 - 40 U/L    ALT 12 10 - 44 U/L    Anion Gap 11 8 - 16 mmol/L    eGFR if African American 31.2 (A) >60 mL/min/1.73 m^2    eGFR if non  27.0 (A) >60 mL/min/1.73 m^2   Magnesium    Collection Time: 05/31/18  5:04 AM   Result Value Ref Range    Magnesium 1.9 1.6 - 2.6 mg/dL   Phosphorus    Collection Time: 05/31/18  5:04 AM   Result Value Ref Range    Phosphorus 5.5 (H) 2.7 - 4.5 mg/dL   CBC auto differential    Collection Time: 05/31/18  5:04 AM   Result Value Ref Range    WBC 5.11 3.90 -  12.70 K/uL    RBC 2.39 (L) 4.60 - 6.20 M/uL    Hemoglobin 7.7 (L) 14.0 - 18.0 g/dL    Hematocrit 24.3 (L) 40.0 - 54.0 %     (H) 82 - 98 fL    MCH 32.2 (H) 27.0 - 31.0 pg    MCHC 31.7 (L) 32.0 - 36.0 g/dL    RDW 16.3 (H) 11.5 - 14.5 %    Platelets 18 (LL) 150 - 350 K/uL    MPV 12.7 9.2 - 12.9 fL    Immature Granulocytes 0.8 (H) 0.0 - 0.5 %    Gran # (ANC) 3.5 1.8 - 7.7 K/uL    Immature Grans (Abs) 0.04 0.00 - 0.04 K/uL    Lymph # 0.9 (L) 1.0 - 4.8 K/uL    Mono # 0.6 0.3 - 1.0 K/uL    Eos # 0.0 0.0 - 0.5 K/uL    Baso # 0.00 0.00 - 0.20 K/uL    nRBC 0 0 /100 WBC    Gran% 69.3 38.0 - 73.0 %    Lymph% 18.2 18.0 - 48.0 %    Mono% 11.5 4.0 - 15.0 %    Eosinophil% 0.2 0.0 - 8.0 %    Basophil% 0.0 0.0 - 1.9 %    Aniso Slight     Poik Slight     Poly Occasional     Hypo Occasional     Ovalocytes Occasional     Tear Drop Cells Occasional     Nargis Cells Occasional     Fragmented Cells Occasional     Differential Method Automated    Protime-INR    Collection Time: 05/31/18  5:04 AM   Result Value Ref Range    Prothrombin Time 10.5 9.0 - 12.5 sec    INR 1.0 0.8 - 1.2   Cyclosporine level    Collection Time: 05/31/18  5:04 AM   Result Value Ref Range    Cyclosporine, LC/MS 22 (L) 100 - 400 ng/mL          Medical ROS  General ROS: negative for - chills, fatigue or fever  Respiratory ROS: no cough, shortness of breath, or wheezing  Cardiovascular ROS: no chest pain or dyspnea on exertion  Gastrointestinal ROS: no abdominal pain, change in bowel habits, or black or bloody stools  Musculoskeletal ROS: negative for - gait disturbance, joint stiffness, muscle pain or muscular weakness  Neurological ROS: negative for - confusion, dizziness, gait disturbance, headaches, impaired coordination/balance, seizures or visual changes      Mental Status Exam:  Appearance: age appropriate, thin & gaunt looking, seated on couch   Behavior/Cooperation: limited/ appopriate friendly and cooperative, eye contact normal  Speech: appropriate rate,  volume and tone normal tone, normal rate, normal pitch, normal volume  Language: grossly intact, able to name, able to repeat with spontaneous speech  Mood: euthymic  Affect:  flat affect, not congruent with mood   Thought Process: normal and logical  Thought Content: normal, no suicidality, no homicidality, delusions, or paranoia  Sensorium:  Awake/Delirium/Somnolence  Alert and Oriented: x4 grossly intact, person, place, situation, time/date  Memory: 3/3 immediate, 3/3 at 5 minutes    Recent:  Intact; able to report recent events   Remote:  Did not test   Attention/concentration: decreased, failed serial 7's  Similarities:  Intact  Abstract reasoning:  Intact  Insight:  Intact  Judgment: Intact      RASS-  CAM ICU- negative      Nicole Clifford, M3  5/31/2018 9:59 AM

## 2018-05-31 NOTE — SUBJECTIVE & OBJECTIVE
Scheduled Meds:   amLODIPine  5 mg Oral Daily    bisacodyl  10 mg Oral Daily    docusate sodium  100 mg Oral BID    dronabinol  2.5 mg Oral QHS    [START ON 6/1/2018] epoetin maurisio  20,000 Units Subcutaneous Every Mon, Fri    famotidine  20 mg Oral QHS    fluconazole  200 mg Oral Daily    gabapentin  100 mg Oral QHS    levETIRAcetam  500 mg Oral BID    levothyroxine  75 mcg Oral Before breakfast    polyethylene glycol  17 g Oral Daily    predniSONE  60 mg Oral Daily    risperiDONE  0.5 mg Oral QHS    sevelamer carbonate  1,600 mg Oral TID WM    sodium chloride 0.9%  500 mL Rectal Once    ursodiol  300 mg Oral BID     Continuous Infusions:  PRN Meds:acetaminophen, dextrose 50%, diphenhydrAMINE, gentamicin, glucagon (human recombinant), omnipaque, ondansetron, oxyCODONE, ramelteon, sodium chloride 0.9%    Review of Systems   Constitutional: Positive for fatigue. Negative for activity change, appetite change and chills.   HENT: Negative.    Eyes: Negative.    Respiratory: Negative for chest tightness and shortness of breath.    Cardiovascular: Positive for leg swelling. Negative for palpitations.   Gastrointestinal: Positive for abdominal distention, abdominal pain and constipation. Negative for nausea and vomiting.   Genitourinary: Negative.    Musculoskeletal: Negative for arthralgias.   Skin: Negative.    Allergic/Immunologic: Positive for immunocompromised state.   Neurological: Negative.    Psychiatric/Behavioral: Positive for sleep disturbance. Negative for confusion and decreased concentration. The patient is nervous/anxious.      Objective:     Vital Signs (Most Recent):  Temp: 98.9 °F (37.2 °C) (05/31/18 0744)  Pulse: 82 (05/31/18 1019)  Resp: 16 (05/31/18 0744)  BP: (!) 122/59 (05/31/18 0744)  SpO2: 99 % (05/31/18 0744) Vital Signs (24h Range):  Temp:  [98.8 °F (37.1 °C)-99.4 °F (37.4 °C)] 98.9 °F (37.2 °C)  Pulse:  [80-90] 82  Resp:  [16-20] 16  SpO2:  [96 %-100 %] 99 %  BP: (122-155)/(59-93)  122/59     Weight: 65.2 kg (143 lb 11.8 oz)  Body mass index is 23.2 kg/m².    Intake/Output - Last 3 Shifts       05/29 0700 - 05/30 0659 05/30 0700 - 05/31 0659 05/31 0700 - 06/01 0659    P.O. 100 240     Blood  350     Other 300      Total Intake(mL/kg) 400 (6.1) 590 (9)     Urine (mL/kg/hr) 0 (0) 0 (0) 260 (0.6)    Emesis/NG output 0 (0) 0 (0)     Other 2483 (1.6) 0 (0)     Stool 0 (0) 0 (0)     Blood 0 (0) 0 (0)     Total Output 2483 0 260    Net -2083 +590 -260           Urine Occurrence 0 x 1 x     Stool Occurrence 1 x 2 x     Emesis Occurrence 0 x 0 x           Physical Exam   Constitutional: He is oriented to person, place, and time. Vital signs are normal. He appears well-developed and well-nourished.   Chronic ill appearing    HENT:   Head: Normocephalic and atraumatic.   Right Ear: Hearing, tympanic membrane and ear canal normal.   Left Ear: Hearing, tympanic membrane and ear canal normal.   Nose: Nose normal.   Mouth/Throat: Uvula is midline.   Eyes: Pupils are equal, round, and reactive to light.   Neck: Trachea normal, normal range of motion and full passive range of motion without pain. No JVD present.   Cardiovascular: Normal rate, regular rhythm, normal heart sounds and normal pulses.    No murmur heard.  Pulmonary/Chest: Effort normal and breath sounds normal. No respiratory distress. He has no wheezes. He has no rales. He exhibits no tenderness.   Diminished at bases.    Abdominal: Soft. Normal appearance and bowel sounds are normal. He exhibits distension. He exhibits no ascites. There is tenderness. There is no rigidity, no rebound, no guarding and no CVA tenderness.   Musculoskeletal: Normal range of motion. He exhibits edema (BLE ). He exhibits no tenderness.   Neurological: He is alert and oriented to person, place, and time. He has normal reflexes.   Skin: Skin is warm and dry.   R IJ permacath   Psychiatric: He has a normal mood and affect. His speech is normal and behavior is normal.  Judgment and thought content normal. Cognition and memory are normal.       Laboratory:  Immunosuppressants     None        CBC:   Recent Labs  Lab 05/31/18  0504   WBC 5.11   RBC 2.39*   HGB 7.7*   HCT 24.3*   PLT 18*   *   MCH 32.2*   MCHC 31.7*     CMP:   Recent Labs  Lab 05/31/18  0504   GLU 83   CALCIUM 8.3*   ALBUMIN 2.5*   PROT 5.8*      K 4.0   CO2 21*      BUN 64*   CREATININE 3.0*   ALKPHOS 101   ALT 12   AST 18   BILITOT 1.4*     Labs within the past 24 hours have been reviewed.    Diagnostic Results:  pertinent imaging reviewed.

## 2018-05-31 NOTE — SUBJECTIVE & OBJECTIVE
"Interval History: see hospital course    Family History     Problem Relation (Age of Onset)    No Known Problems Mother        Social History Main Topics    Smoking status: Former Smoker     Packs/day: 1.00     Years: 10.00     Types: Cigarettes     Start date: 2/19/2017    Smokeless tobacco: Never Used    Alcohol use No      Comment: a fifth of liquor daily for years, cut back over last 2 months    Drug use: No    Sexual activity: Not on file     Psychotherapeutics     Start     Stop Route Frequency Ordered    05/30/18 2100  risperiDONE tablet 0.5 mg      -- Oral Nightly 05/30/18 1605    05/27/18 2116  ramelteon tablet 8 mg      -- Oral Nightly PRN 05/27/18 2019           Review of Systems  Objective:     Vital Signs (Most Recent):  Temp: 98.9 °F (37.2 °C) (05/31/18 0744)  Pulse: 80 (05/31/18 0744)  Resp: 16 (05/31/18 0744)  BP: (!) 122/59 (05/31/18 0744)  SpO2: 99 % (05/31/18 0744) Vital Signs (24h Range):  Temp:  [98.8 °F (37.1 °C)-99.4 °F (37.4 °C)] 98.9 °F (37.2 °C)  Pulse:  [80-92] 80  Resp:  [16-20] 16  SpO2:  [96 %-100 %] 99 %  BP: (122-155)/(59-93) 122/59     Height: 5' 6" (167.6 cm)  Weight: 65.2 kg (143 lb 11.8 oz)  Body mass index is 23.2 kg/m².      Intake/Output Summary (Last 24 hours) at 05/31/18 1045  Last data filed at 05/31/18 0857   Gross per 24 hour   Intake              590 ml   Output              260 ml   Net              330 ml       Physical Exam    Pt asleep on my evaluation, per medical student exam with my edits for clarity:  Mental Status Exam:   Appearance: age appropriate, thin & gaunt looking, seated on couch   Behavior/Cooperation: limited/ appopriate friendly and cooperative, eye contact normal  Speech: appropriate rate, volume and tone normal tone, normal rate, normal pitch, normal volume  Language: grossly intact, able to name, able to repeat with spontaneous speech  Mood: euthymic  Affect:  constricted   Thought Process: normal and logical  Thought Content: normal, no " suicidality, no homicidality, delusions, or paranoia  Sensorium:  Awake  Alert and Oriented: x4 grossly intact, person, place, situation, time/date  Memory: 3/3 immediate, 3/3 at 5 minutes               Recent:  Intact; able to report recent events              Remote:  Did not test   Attention/concentration: VIOLETAART with 0 errors, serial 7's to 93  Similarities:  Intact  Abstract reasoning:  Intact  Insight:  Intact  Judgment: Intact    CAM-ICU negative    Significant Labs:   Last 24 Hours:   Recent Lab Results       05/31/18  0504      Immature Granulocytes 0.8(H)     Immature Grans (Abs) 0.04  Comment:  Mild elevation in immature granulocytes is non specific and   can be seen in a variety of conditions including stress response,   acute inflammation, trauma and pregnancy. Correlation with other   laboratory and clinical findings is essential.       Albumin 2.5(L)     Alkaline Phosphatase 101     ALT 12     Anion Gap 11     Aniso Slight     AST 18     Baso # 0.00     Basophil% 0.0     Total Bilirubin 1.4  Comment:  For infants and newborns, interpretation of results should be based  on gestational age, weight and in agreement with clinical  observations.  Premature Infant recommended reference ranges:  Up to 24 hours.............<8.0 mg/dL  Up to 48 hours............<12.0 mg/dL  3-5 days..................<15.0 mg/dL  6-29 days.................<15.0 mg/dL  (H)     BUN, Bld 64(H)     Nargis Cells Occasional     Calcium 8.3(L)     Chloride 107     CO2 21(L)     Creatinine 3.0(H)     Cyclosporine, LC/MS 22  Comment:  Reference Normals:  For Kidney Transplants: 100-300 ng/mL  Testing performed by Liquid Chromatography-Tandem  Mass Spectrometry (LC-MS/MS).  This test was developed and its performance characteristics  determined by [facility], Department of Pathology  and Laboratory Medicine in a manner consistent with CLIA  requirements. It has not been cleared or approved by the [place]  Food and Drug  Administration.  This test is used for clinical  purposes.  It should not be regarded as investigational or for  research.  (L)     Differential Method Automated     eGFR if  31.2(A)     eGFR if non  27.0  Comment:  Calculation used to obtain the estimated glomerular filtration  rate (eGFR) is the CKD-EPI equation.   (A)     Eos # 0.0     Eosinophil% 0.2     Fragmented Cells Occasional     Glucose 83     Gran # (ANC) 3.5     Gran% 69.3     Hematocrit 24.3(L)     Hemoglobin 7.7(L)     Hypo Occasional     Coumadin Monitoring INR 1.0  Comment:  Coumadin Therapy:  2.0 - 3.0 for INR for all indicators except mechanical heart valves  and antiphospholipid syndromes which should use 2.5 - 3.5.       Lymph # 0.9(L)     Lymph% 18.2     Magnesium 1.9     MCH 32.2(H)     MCHC 31.7(L)     (H)     Mono # 0.6     Mono% 11.5     MPV 12.7     nRBC 0     Ovalocytes Occasional     Phosphorus 5.5(H)     Platelets 18  Comment:  PLT   critical result(s) called and verbal readback obtained from   NURSE ROSS, 05/31/2018 07:03  (LL)     Poik Slight     Poly Occasional     Potassium 4.0     Total Protein 5.8(L)     Protime 10.5     RBC 2.39(L)     RDW 16.3(H)     Sodium 139     Tear Drop Cells Occasional     WBC 5.11           Significant Imaging: I have reviewed all pertinent imaging results/findings within the past 24 hours.

## 2018-05-31 NOTE — ASSESSMENT & PLAN NOTE
- Pt on  steroids. Continue to monitor for therapeutic levels and toxicities. Adjust doses accordingly.  - Continuing to hold cyclo for mental status changes and hemolytic anemia/thrombocytopenia    - team to discuss future immunosuppression

## 2018-05-31 NOTE — PROGRESS NOTES
[facility]  Liver Transplant  Progress Note    Patient Name: Jhonny Diana  MRN: [id number]  Admission Date: 2018  Hospital Length of Stay: 4 days  Code Status: Full Code  Primary Care Provider: Primary Doctor No  Post-Operative Day: 224    ORGAN:   LIVER  Disease Etiology: Acute Alcoholic Hepatitis  Donor Type:    - Brain Death  CDC High Risk:   No  Donor CMV Status:   Donor CMV Status: Positive  Donor HBcAB:   Negative  Donor HCV Status:   Negative  Whole or Partial: Whole Liver  Biliary Anastomosis: End to End  Arterial Anatomy: Standard  Subjective:     History of Present Illness:  Mr. 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  S/p OLTX 10/19/2017 (Liver) with steroid induction. Post op course with multiple complications to include biliary stricture, FUO, ESRD, and chronic abdominal pain. Recently discharged on  after workup for abdominal pain and thrombocytopenia. He presented  with worsening abdominal pain since Friday and distention. Denies nausea, vomiting, sob, cp, fevers, or any other new or concerning symptoms. Reports no BM x3 days.        Hospital Course:  Hospital Course: Patient with persistent abdominal pain and no BM despite lactulose and suppository. Unable to tolerate HD d/t agitation and abdominal discomfort- requested to be taken off. Potassium elevated since HD not completed. Shifted with insulin/dextrose, calcium gluc, bicarb, and diuretics.  Potassium improved following. Patient tearful and overwhelmed. Still with some abnormal mouth and hand twitching, odd comments, and confusion. Able to toelrate HD on  with reassurance and assistance from his mom. Mental status much improved following HD- pt less sluggish, quicker to respond, and responding appropriately. Psych consult per pt request for anxiety and depression. Risperdal started 18.     Interval history: No acute events overnight, patient reports minimal sleep. Mental status  remains with improvement.  Met with psych team yesterday, started Risperdal last night. Tolerating diet well. Requested enema x2 yesterday, both resulting in smal BM. KUB from today (5/31) still with significant amounts of stool. Will start miralax. Will discuss Movantik with GI for ?narcotic gut. LFTs remains stable. Continuing to hold cyclosporine for now, team to discuss alternative immunosuppression. Hematology following. Retic and LDH improved from last admission. PLTs 18k today. H/H responded to PRBC yesterday.     Scheduled Meds:   amLODIPine  5 mg Oral Daily    bisacodyl  10 mg Oral Daily    docusate sodium  100 mg Oral BID    dronabinol  2.5 mg Oral QHS    [START ON 6/1/2018] epoetin maurisio  20,000 Units Subcutaneous Every Mon, Fri    famotidine  20 mg Oral QHS    fluconazole  200 mg Oral Daily    gabapentin  100 mg Oral QHS    levETIRAcetam  500 mg Oral BID    levothyroxine  75 mcg Oral Before breakfast    polyethylene glycol  17 g Oral Daily    predniSONE  60 mg Oral Daily    risperiDONE  0.5 mg Oral QHS    sevelamer carbonate  1,600 mg Oral TID WM    sodium chloride 0.9%  500 mL Rectal Once    ursodiol  300 mg Oral BID     Continuous Infusions:  PRN Meds:acetaminophen, dextrose 50%, diphenhydrAMINE, gentamicin, glucagon (human recombinant), omnipaque, ondansetron, oxyCODONE, ramelteon, sodium chloride 0.9%    Review of Systems   Constitutional: Positive for fatigue. Negative for activity change, appetite change and chills.   HENT: Negative.    Eyes: Negative.    Respiratory: Negative for chest tightness and shortness of breath.    Cardiovascular: Positive for leg swelling. Negative for palpitations.   Gastrointestinal: Positive for abdominal distention, abdominal pain and constipation. Negative for nausea and vomiting.   Genitourinary: Negative.    Musculoskeletal: Negative for arthralgias.   Skin: Negative.    Allergic/Immunologic: Positive for immunocompromised state.   Neurological:  Negative.    Psychiatric/Behavioral: Positive for sleep disturbance. Negative for confusion and decreased concentration. The patient is nervous/anxious.      Objective:     Vital Signs (Most Recent):  Temp: 98.9 °F (37.2 °C) (05/31/18 0744)  Pulse: 82 (05/31/18 1019)  Resp: 16 (05/31/18 0744)  BP: (!) 122/59 (05/31/18 0744)  SpO2: 99 % (05/31/18 0744) Vital Signs (24h Range):  Temp:  [98.8 °F (37.1 °C)-99.4 °F (37.4 °C)] 98.9 °F (37.2 °C)  Pulse:  [80-90] 82  Resp:  [16-20] 16  SpO2:  [96 %-100 %] 99 %  BP: (122-155)/(59-93) 122/59     Weight: 65.2 kg (143 lb 11.8 oz)  Body mass index is 23.2 kg/m².    Intake/Output - Last 3 Shifts       05/29 0700 - 05/30 0659 05/30 0700 - 05/31 0659 05/31 0700 - 06/01 0659    P.O. 100 240     Blood  350     Other 300      Total Intake(mL/kg) 400 (6.1) 590 (9)     Urine (mL/kg/hr) 0 (0) 0 (0) 260 (0.6)    Emesis/NG output 0 (0) 0 (0)     Other 2483 (1.6) 0 (0)     Stool 0 (0) 0 (0)     Blood 0 (0) 0 (0)     Total Output 2483 0 260    Net -2083 +590 -260           Urine Occurrence 0 x 1 x     Stool Occurrence 1 x 2 x     Emesis Occurrence 0 x 0 x           Physical Exam   Constitutional: He is oriented to person, place, and time. Vital signs are normal. He appears well-developed and well-nourished.   Chronic ill appearing    HENT:   Head: Normocephalic and atraumatic.   Right Ear: Hearing, tympanic membrane and ear canal normal.   Left Ear: Hearing, tympanic membrane and ear canal normal.   Nose: Nose normal.   Mouth/Throat: Uvula is midline.   Eyes: Pupils are equal, round, and reactive to light.   Neck: Trachea normal, normal range of motion and full passive range of motion without pain. No JVD present.   Cardiovascular: Normal rate, regular rhythm, normal heart sounds and normal pulses.    No murmur heard.  Pulmonary/Chest: Effort normal and breath sounds normal. No respiratory distress. He has no wheezes. He has no rales. He exhibits no tenderness.   Diminished at bases.     Abdominal: Soft. Normal appearance and bowel sounds are normal. He exhibits distension. He exhibits no ascites. There is tenderness. There is no rigidity, no rebound, no guarding and no CVA tenderness.   Musculoskeletal: Normal range of motion. He exhibits edema (BLE ). He exhibits no tenderness.   Neurological: He is alert and oriented to person, place, and time. He has normal reflexes.   Skin: Skin is warm and dry.   R IJ permacath   Psychiatric: He has a normal mood and affect. His speech is normal and behavior is normal. Judgment and thought content normal. Cognition and memory are normal.       Laboratory:  Immunosuppressants     None        CBC:   Recent Labs  Lab 05/31/18  0504   WBC 5.11   RBC 2.39*   HGB 7.7*   HCT 24.3*   PLT 18*   *   MCH 32.2*   MCHC 31.7*     CMP:   Recent Labs  Lab 05/31/18  0504   GLU 83   CALCIUM 8.3*   ALBUMIN 2.5*   PROT 5.8*      K 4.0   CO2 21*      BUN 64*   CREATININE 3.0*   ALKPHOS 101   ALT 12   AST 18   BILITOT 1.4*     Labs within the past 24 hours have been reviewed.    Diagnostic Results:  pertinent imaging reviewed.     Assessment/Plan:     * Abdominal pain    - admit with abdominal pain  - denies N/V  - xray with significant stool, no obstruction noted  - LFTs stable  - CT A/P with ascites, anasarca, and moderate stool. No other significant/acute findings noted.   - lactulose, suppository, and kayexalate given without response. Brown bomb enema given 5/29 resulting in small-medium BM. Given again 5/20 x2 per patient request with BMs noted   - KUB 5/31 still  With significant amounts of stool.   - pain slightly improved today but still present   - will discuss narcotic gut with GI team, will ask about Movantic?           Constipation    - patient with chronic constipation, no BM x3 days  - stool softeners ordered  - lactulose, suppository, and kayexalate given without response. Brown bomb enema  Successfully given 5/29 and 5/30  - miralax added to  "regimen   - see above for further details.         Anxiety about health    - Pt admitted with cognitive slowing, confusion, being "off", twitchy mouth movements, and slight hand tremors  - CT negative for acute processes  - Bcx NGTD, UA clean, UCx pending.  - Cont hold cyclo for now  - jerky movements could be related to reglan (?tardive dyskinesias), will hold  - mental status much improved today. Less sluggish with appropriate responses. Still remains tearful and emotional regarding future/current health situation   - dependent on mom and becomes emotional when talking about all she has done to help him   - pt requested psych consult for establishment of relationship for outpatient psychotherapy. Consult placed. appreciate assistance.            S/P liver transplant    - S/p liver transplant with multiple admissions and last ERCP 5/4   - stent in place per imaging  - LFTs stable  - monitor         Long-term use of immunosuppressant medication    - Pt on  steroids. Continue to monitor for therapeutic levels and toxicities. Adjust doses accordingly.  - Continuing to hold cyclo for mental status changes and hemolytic anemia/thrombocytopenia    - team to discuss future immunosuppression           Prophylactic immunotherapy    - please see long term IS        At risk for opportunistic infections    - pentamidine for pcp prophylaxis (last treatment 5/21)  - valcyte on hold as neutropenic, continue surveillance CMV PCR ( last checked 5/22, undetected). 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  Per hematology " this may be reactive to underlying organ dysfunction, drug and or peripheral destruction (specifically referring to platelets  - HIT panel negative previously.   - Per previous Heme recommendations on last admit, cont with steroids  - restarted cyclosporine 5/21  - hematology re consulted this admission, appreciate recs and assistance.    - continue steroids   - transfuse for PLTS <10  - received 1 pack PLTs 5/28 d/t PLT count of 9. Responded well.           Anemia of chronic renal failure    - h/h chronically low, stable  - received 1 unit PRBC 5/30/18  - restart Epo on MW (HD days)  - monitor cbc daily  - hematology following, appreciate assistance           ESRD Monday/Friday    - nephrology following  - outpatient HD MF schedule  - patient to HD 5/28, but adamant about being taken off after 45 minutes.     -with hyperkalemia d/t incomplete HD overnight. Given insulin/dextrose, calcium gluconate, diuretics, and bicarb with improvement.  - Pt to HD again 5/29 for electrolyte and toxin clearance and volume removal.   - restart epo for HD days  - patient with more anxiety/pain at HD, will give extra dose of pain med prior to HD.             VTE Risk Mitigation         Ordered     Place ISMA hose  Until discontinued      05/27/18 2019     Place sequential compression device  Until discontinued      05/27/18 2019     Reason for No Pharmacological VTE Prophylaxis  Once      05/27/18 2019     IP VTE HIGH RISK PATIENT  Once      05/27/18 2019          The patients clinical status was discussed at multidisplinary rounds, involving transplant surgery, transplant medicine, pharmacy, nursing, nutrition, and social work    Discharge Planning: not a candidate for d/c at this time     Fransisca Thomas PA-C  Liver Transplant  [facility]

## 2018-05-31 NOTE — HOSPITAL COURSE
"05/31/2018: Per RN note 5/30: "Pt AAO x4 throughout shift. Pt much more clear and alert this shift vs yesterday." Patient asleep on my interview, per medical student interview pt reported that he is in a good mood this morning. Patient denied depression, SI/HI, hallucinations. No side effects to Risperdal. Per mother at bedside, patient was very "emotional" last night and didn't want her to leave which she says is unusual for him. She says though his mental status seemed clearer overnight he again did not sleep and every hour asked her slightly inappropriate things, such as if she wanted to go for a walk in the middle of the night. Has been very anxious about his abdominal pain.      6/1/2018   Patient tolerated first dose of Risperdal well and denied side effects.  Feels the Risperdal may have helped with sleep.  Slept 4 hours overnight, which is an improvement.  Encouraged to keep blinds open during the day and continue walking the unit as he is able.  Struggles with pain and feels he is under medicated.   Passed SAVEAHAART and all orientation questions.     6/4/2018  No acute events over the weekend.  Patient's mental status remains alert and oriented.  His main concern is uncontrolled pain, and has been counseled by his primary team regarding diagnosis of narcotic gut and the need to refrain from opiates.  Today, patient reports uncontrollable pain preventing sleep.  He is considering leaving against medical advice, and reports his NP told him he remains in the hospital only for uncontrolled pain.  He states that communication with the primary team has been poor, and requests speaking with his surgical team regarding diagnosis and prognosis.  He is agreeable to decreasing Risperdal dose as it is no longer warranted.     "

## 2018-05-31 NOTE — ASSESSMENT & PLAN NOTE
- patient with chronic constipation, no BM x3 days  - stool softeners ordered  - lactulose, suppository, and kayexalate given without response. Brown bomb enema  Successfully given 5/29 and 5/30  - miralax added to regimen   - see above for further details.

## 2018-05-31 NOTE — NURSING
"RN notified transplant resident, Dr. Burton, of pt's preferred "brown bomb" enema over magnesium citrate; as well as request for additional pain medication and recent vital signs indicating pt's distress.  MD stated pt cannot receive any more pain medication at this time and that pt should try the magnesium citrate first.  No new orders received.    RN informed pt and his mom of this information.  Both verbalized understanding.  Pt tearful in requesting pain medication, stated he "would do whatever we wanted."  RN provided emotional support and empathetic listening.  Pt states he understands why it must be tried this way and just wants the pain relieved.      RN will continue to monitor.  "

## 2018-06-01 PROBLEM — F05 ACUTE CONFUSIONAL STATE: Status: ACTIVE | Noted: 2018-06-01

## 2018-06-01 LAB
ALBUMIN SERPL BCP-MCNC: 2.4 G/DL
ALP SERPL-CCNC: 100 U/L
ALT SERPL W/O P-5'-P-CCNC: 11 U/L
ANION GAP SERPL CALC-SCNC: 10 MMOL/L
ANISOCYTOSIS BLD QL SMEAR: SLIGHT
AST SERPL-CCNC: 17 U/L
BACTERIA BLD CULT: NORMAL
BACTERIA BLD CULT: NORMAL
BASOPHILS # BLD AUTO: 0 K/UL
BASOPHILS NFR BLD: 0 %
BILIRUB SERPL-MCNC: 1.1 MG/DL
BUN SERPL-MCNC: 72 MG/DL
CALCIUM SERPL-MCNC: 8.5 MG/DL
CHLORIDE SERPL-SCNC: 109 MMOL/L
CHOLEST SERPL-MCNC: 140 MG/DL
CHOLEST/HDLC SERPL: 3 {RATIO}
CO2 SERPL-SCNC: 20 MMOL/L
CREAT SERPL-MCNC: 3.3 MG/DL
CYCLOSPORINE BLD LC/MS/MS-MCNC: 10 NG/ML
DACRYOCYTES BLD QL SMEAR: ABNORMAL
DIFFERENTIAL METHOD: ABNORMAL
EOSINOPHIL # BLD AUTO: 0 K/UL
EOSINOPHIL NFR BLD: 0 %
ERYTHROCYTE [DISTWIDTH] IN BLOOD BY AUTOMATED COUNT: 15.9 %
EST. GFR  (AFRICAN AMERICAN): 27.8 ML/MIN/1.73 M^2
EST. GFR  (NON AFRICAN AMERICAN): 24.1 ML/MIN/1.73 M^2
GLUCOSE SERPL-MCNC: 104 MG/DL
HCT VFR BLD AUTO: 21.7 %
HDLC SERPL-MCNC: 47 MG/DL
HDLC SERPL: 33.6 %
HGB BLD-MCNC: 7 G/DL
HYPOCHROMIA BLD QL SMEAR: ABNORMAL
IMM GRANULOCYTES # BLD AUTO: 0.03 K/UL
IMM GRANULOCYTES NFR BLD AUTO: 0.6 %
INR PPP: 1
LDLC SERPL CALC-MCNC: 75.2 MG/DL
LYMPHOCYTES # BLD AUTO: 0.7 K/UL
LYMPHOCYTES NFR BLD: 13 %
MAGNESIUM SERPL-MCNC: 2.6 MG/DL
MCH RBC QN AUTO: 32.4 PG
MCHC RBC AUTO-ENTMCNC: 32.3 G/DL
MCV RBC AUTO: 101 FL
MONOCYTES # BLD AUTO: 0.4 K/UL
MONOCYTES NFR BLD: 8.2 %
NEUTROPHILS # BLD AUTO: 4.1 K/UL
NEUTROPHILS NFR BLD: 78.2 %
NONHDLC SERPL-MCNC: 93 MG/DL
NRBC BLD-RTO: 0 /100 WBC
OVALOCYTES BLD QL SMEAR: ABNORMAL
PHOSPHATE SERPL-MCNC: 5.2 MG/DL
PLATELET # BLD AUTO: 13 K/UL
PLATELET BLD QL SMEAR: ABNORMAL
PMV BLD AUTO: 12.2 FL
POIKILOCYTOSIS BLD QL SMEAR: SLIGHT
POLYCHROMASIA BLD QL SMEAR: ABNORMAL
POTASSIUM SERPL-SCNC: 4.8 MMOL/L
PROT SERPL-MCNC: 5.4 G/DL
PROTHROMBIN TIME: 10.8 SEC
RBC # BLD AUTO: 2.16 M/UL
SCHISTOCYTES BLD QL SMEAR: PRESENT
SODIUM SERPL-SCNC: 139 MMOL/L
TRIGL SERPL-MCNC: 89 MG/DL
WBC # BLD AUTO: 5.25 K/UL

## 2018-06-01 PROCEDURE — 25000003 PHARM REV CODE 250: Mod: NTX | Performed by: STUDENT IN AN ORGANIZED HEALTH CARE EDUCATION/TRAINING PROGRAM

## 2018-06-01 PROCEDURE — 80158 DRUG ASSAY CYCLOSPORINE: CPT | Mod: NTX

## 2018-06-01 PROCEDURE — 99232 SBSQ HOSP IP/OBS MODERATE 35: CPT | Mod: NTX,,, | Performed by: INTERNAL MEDICINE

## 2018-06-01 PROCEDURE — 25000003 PHARM REV CODE 250: Mod: NTX | Performed by: PHYSICIAN ASSISTANT

## 2018-06-01 PROCEDURE — P9047 ALBUMIN (HUMAN), 25%, 50ML: HCPCS | Mod: JG,NTX | Performed by: PHYSICIAN ASSISTANT

## 2018-06-01 PROCEDURE — 63600175 PHARM REV CODE 636 W HCPCS: Mod: JG,NTX | Performed by: PHYSICIAN ASSISTANT

## 2018-06-01 PROCEDURE — 63600175 PHARM REV CODE 636 W HCPCS: Mod: NTX | Performed by: STUDENT IN AN ORGANIZED HEALTH CARE EDUCATION/TRAINING PROGRAM

## 2018-06-01 PROCEDURE — 80053 COMPREHEN METABOLIC PANEL: CPT | Mod: NTX

## 2018-06-01 PROCEDURE — 36415 COLL VENOUS BLD VENIPUNCTURE: CPT | Mod: NTX

## 2018-06-01 PROCEDURE — 80061 LIPID PANEL: CPT | Mod: NTX

## 2018-06-01 PROCEDURE — 85610 PROTHROMBIN TIME: CPT | Mod: NTX

## 2018-06-01 PROCEDURE — 83735 ASSAY OF MAGNESIUM: CPT | Mod: NTX

## 2018-06-01 PROCEDURE — 20600001 HC STEP DOWN PRIVATE ROOM: Mod: NTX

## 2018-06-01 PROCEDURE — 99232 SBSQ HOSP IP/OBS MODERATE 35: CPT | Mod: NTX,,, | Performed by: PSYCHIATRY & NEUROLOGY

## 2018-06-01 PROCEDURE — 84100 ASSAY OF PHOSPHORUS: CPT | Mod: NTX

## 2018-06-01 PROCEDURE — 99233 SBSQ HOSP IP/OBS HIGH 50: CPT | Mod: NTX,,, | Performed by: PHYSICIAN ASSISTANT

## 2018-06-01 PROCEDURE — 85025 COMPLETE CBC W/AUTO DIFF WBC: CPT | Mod: NTX

## 2018-06-01 RX ORDER — PSEUDOEPHEDRINE/ACETAMINOPHEN 30MG-500MG
100 TABLET ORAL ONCE
Status: DISCONTINUED | OUTPATIENT
Start: 2018-06-01 | End: 2018-06-04 | Stop reason: HOSPADM

## 2018-06-01 RX ORDER — SYRING-NEEDL,DISP,INSUL,0.3 ML 29 G X1/2"
300 SYRINGE, EMPTY DISPOSABLE MISCELLANEOUS ONCE
Status: DISCONTINUED | OUTPATIENT
Start: 2018-06-01 | End: 2018-06-04 | Stop reason: HOSPADM

## 2018-06-01 RX ORDER — ALBUMIN HUMAN 250 G/1000ML
25 SOLUTION INTRAVENOUS ONCE
Status: COMPLETED | OUTPATIENT
Start: 2018-06-01 | End: 2018-06-01

## 2018-06-01 RX ORDER — POLYETHYLENE GLYCOL 3350, SODIUM SULFATE ANHYDROUS, SODIUM BICARBONATE, SODIUM CHLORIDE, POTASSIUM CHLORIDE 236; 22.74; 6.74; 5.86; 2.97 G/4L; G/4L; G/4L; G/4L; G/4L
4000 POWDER, FOR SOLUTION ORAL ONCE
Status: COMPLETED | OUTPATIENT
Start: 2018-06-01 | End: 2018-06-01

## 2018-06-01 RX ORDER — HYDROMORPHONE HYDROCHLORIDE 1 MG/ML
0.5 INJECTION, SOLUTION INTRAMUSCULAR; INTRAVENOUS; SUBCUTANEOUS ONCE
Status: COMPLETED | OUTPATIENT
Start: 2018-06-01 | End: 2018-06-01

## 2018-06-01 RX ORDER — SIROLIMUS 1 MG/1
1 TABLET, FILM COATED ORAL DAILY
Status: DISCONTINUED | OUTPATIENT
Start: 2018-06-01 | End: 2018-06-04 | Stop reason: HOSPADM

## 2018-06-01 RX ORDER — HYDROMORPHONE HYDROCHLORIDE 2 MG/1
2 TABLET ORAL ONCE
Status: COMPLETED | OUTPATIENT
Start: 2018-06-01 | End: 2018-06-01

## 2018-06-01 RX ORDER — SODIUM CHLORIDE 9 MG/ML
INJECTION, SOLUTION INTRAVENOUS
Status: CANCELLED | OUTPATIENT
Start: 2018-06-01

## 2018-06-01 RX ADMIN — LEVETIRACETAM 500 MG: 500 TABLET ORAL at 09:06

## 2018-06-01 RX ADMIN — POLYETHYLENE GLYCOL 3350, SODIUM SULFATE ANHYDROUS, SODIUM BICARBONATE, SODIUM CHLORIDE, POTASSIUM CHLORIDE 4000 ML: 236; 22.74; 6.74; 5.86; 2.97 POWDER, FOR SOLUTION ORAL at 12:06

## 2018-06-01 RX ADMIN — DRONABINOL 2.5 MG: 2.5 CAPSULE ORAL at 09:06

## 2018-06-01 RX ADMIN — GABAPENTIN 100 MG: 100 CAPSULE ORAL at 09:06

## 2018-06-01 RX ADMIN — SEVELAMER CARBONATE 1600 MG: 800 TABLET, FILM COATED ORAL at 08:06

## 2018-06-01 RX ADMIN — FLUCONAZOLE 200 MG: 200 TABLET ORAL at 08:06

## 2018-06-01 RX ADMIN — URSODIOL 300 MG: 300 CAPSULE ORAL at 08:06

## 2018-06-01 RX ADMIN — ERYTHROPOIETIN 20000 UNITS: 10000 INJECTION, SOLUTION INTRAVENOUS; SUBCUTANEOUS at 12:06

## 2018-06-01 RX ADMIN — AMLODIPINE BESYLATE 5 MG: 5 TABLET ORAL at 08:06

## 2018-06-01 RX ADMIN — OXYCODONE HYDROCHLORIDE 5 MG: 5 TABLET ORAL at 09:06

## 2018-06-01 RX ADMIN — FAMOTIDINE 20 MG: 20 TABLET ORAL at 09:06

## 2018-06-01 RX ADMIN — DOCUSATE SODIUM 100 MG: 100 CAPSULE, LIQUID FILLED ORAL at 09:06

## 2018-06-01 RX ADMIN — HYDROMORPHONE HYDROCHLORIDE 2 MG: 2 TABLET ORAL at 09:06

## 2018-06-01 RX ADMIN — OXYCODONE HYDROCHLORIDE 5 MG: 5 TABLET ORAL at 05:06

## 2018-06-01 RX ADMIN — DOCUSATE SODIUM 100 MG: 100 CAPSULE, LIQUID FILLED ORAL at 08:06

## 2018-06-01 RX ADMIN — LEVETIRACETAM 500 MG: 500 TABLET ORAL at 08:06

## 2018-06-01 RX ADMIN — RAMELTEON 8 MG: 8 TABLET, FILM COATED ORAL at 09:06

## 2018-06-01 RX ADMIN — SIROLIMUS 1 MG: 1 TABLET, SUGAR COATED ORAL at 12:06

## 2018-06-01 RX ADMIN — OXYCODONE HYDROCHLORIDE 5 MG: 5 TABLET ORAL at 03:06

## 2018-06-01 RX ADMIN — URSODIOL 300 MG: 300 CAPSULE ORAL at 09:06

## 2018-06-01 RX ADMIN — HYDROMORPHONE HYDROCHLORIDE 0.5 MG: 1 INJECTION, SOLUTION INTRAMUSCULAR; INTRAVENOUS; SUBCUTANEOUS at 11:06

## 2018-06-01 RX ADMIN — BISACODYL 10 MG: 5 TABLET, COATED ORAL at 08:06

## 2018-06-01 RX ADMIN — LEVOTHYROXINE SODIUM 75 MCG: 75 TABLET ORAL at 05:06

## 2018-06-01 RX ADMIN — ALBUMIN HUMAN 25 G: 0.25 SOLUTION INTRAVENOUS at 10:06

## 2018-06-01 RX ADMIN — PREDNISONE 60 MG: 10 TABLET ORAL at 08:06

## 2018-06-01 RX ADMIN — OXYCODONE HYDROCHLORIDE 5 MG: 5 TABLET ORAL at 12:06

## 2018-06-01 RX ADMIN — RISPERIDONE 1 MG: 1 TABLET ORAL at 09:06

## 2018-06-01 NOTE — ASSESSMENT & PLAN NOTE
Patient with waxing and waning orientation, decreased sleep and significant confusion and mood lability per RN and mother. Patient with BUN 84 5/29 and did subsequently complete HD session which may be responsible for improvement seen 5/30. Patient also on multiple deliriogenic medications including Keppra, Famotidine, Marinol, oxycodone and prednisone.  - Recommend continue Risperdal 1 mg PO qHS. QTc 429 on 5/27, 418 5/31.  - Recommend Ramelteon 8 mg PO qHS PRN insomnia to assist with sleep wake cycle  - May use Zyprexa 5 mg PO (Zydis disintegrating tablet)/IM q8 hours PRN nonredirectable agitation.   · DELIRIUM BEHAVIOR MANAGEMENT  · PLEASE utilize CHEMICAL restraints with PRN meds first for agitation. Minimize use of PHYSICAL restraints  · Keep window shades open and room lit during day and room dim at night in order to promote normal sleep-wake cycles  · Encourage family at bedside. [place] patient often to situation, location, date.  · Continue to Limit or Discontinue use of Narcotics, Benzos and Anti-cholinergic medications as they may worsen delirium.  · Continue medical workup for causative etiology of Delirium. CTH negative, infectious work up thus far negative.

## 2018-06-01 NOTE — SUBJECTIVE & OBJECTIVE
Patient History           Medical as of 6/1/2018     Past Medical History     Diagnosis Date Comments Source    Alcoholic hepatitis with ascites -- -- Provider    Alcoholic hepatitis with ascites -- -- Provider    ESRD on dialysis -- -- Provider    History of hematemesis 9/28/2017 -- Provider    Hypertension -- -- Provider    Renal disorder -- -- Provider    Seizures -- -- Provider          Pertinent Negatives     Diagnosis Date Noted Comments Source    Diabetes mellitus 1/17/2018 -- Provider                  Surgical as of 6/1/2018     Past Surgical History     Procedure Laterality Date Comments Source    APPENDECTOMY -- -- -- Provider    LIVER TRANSPLANT -- -- -- Provider    ESOPHAGOGASTRODUODENOSCOPY -- -- -- Provider    ERCP -- -- -- Provider                  Family as of 6/1/2018     Problem Relation Name Age of Onset Comments Source    No Known Problems Mother -- -- -- Provider            Tobacco Use as of 6/1/2018     Smoking Status Smoking Start Date Smoking Quit Date Packs/day Years Used    Former Smoker 2/19/2017 -- 1.00 10.00    Types Comments Smokeless Tobacco Status Smokeless Tobacco Quit Date Source     Cigarettes -- Never Used -- Provider            Alcohol Use as of 6/1/2018     Alcohol Use Drinks/Week Alcohol/Week Comments Source    No -- -- a fifth of liquor daily for years, cut back over last 2 months Provider            Drug Use as of 6/1/2018     Drug Use Types Frequency Comments Source    No -- -- -- Provider            Sexual Activity as of 6/1/2018     Sexually Active Birth Control Partners Comments Source    -- -- -- -- Provider            Activities of Daily Living as of 6/1/2018    **None**           Social Documentation as of 6/1/2018    **None**           Occupational as of 6/1/2018    **None**           Socioeconomic as of 6/1/2018     Marital Status Spouse Name Number of Children Years Education Preferred Language Ethnicity Race Source    Single -- -- -- English /White  White --         Pertinent History Q A Comments    as of 6/1/2018 Lives with      Place in Birth Order      Lives in      Number of Siblings      Raised by      Legal Involvement      Childhood Trauma      Criminal History of      Financial Status      Highest Level of Education      Does patient have access to a firearm?       Service      Primary Leisure Activity      Spirituality       Past Medical History:   Diagnosis Date    Alcoholic hepatitis with ascites     Alcoholic hepatitis with ascites     ESRD on dialysis     History of hematemesis 9/28/2017    Hypertension     Renal disorder     Seizures      Past Surgical History:   Procedure Laterality Date    APPENDECTOMY      ERCP      ESOPHAGOGASTRODUODENOSCOPY      LIVER TRANSPLANT       Family History     Problem Relation (Age of Onset)    No Known Problems Mother        Social History Main Topics    Smoking status: Former Smoker     Packs/day: 1.00     Years: 10.00     Types: Cigarettes     Start date: 2/19/2017    Smokeless tobacco: Never Used    Alcohol use No      Comment: a fifth of liquor daily for years, cut back over last 2 months    Drug use: No    Sexual activity: Not on file     Review of patient's allergies indicates:   Allergen Reactions    Bactrim [sulfamethoxazole-trimethoprim] Other (See Comments)     Mookie Trell Syndrome       No current facility-administered medications on file prior to encounter.      Current Outpatient Prescriptions on File Prior to Encounter   Medication Sig    amLODIPine (NORVASC) 5 MG tablet Take 1 tablet (5 mg total) by mouth once daily.    bisacodyl (DULCOLAX) 5 mg EC tablet Take 2 tablets (10 mg total) by mouth once daily.    cycloSPORINE modified, NEORAL, (NEORAL) 100 MG capsule Take 1 capsule (100 mg total) by mouth 2 (two) times daily.    cycloSPORINE modified, NEORAL, 25 MG capsule Take 1 capsule (25 mg total) by mouth 2 (two) times daily. Total 125mg twice daily.    dronabinol  (MARINOL) 2.5 MG capsule Take 1 capsule (2.5 mg total) by mouth every evening.    ergocalciferol (ERGOCALCIFEROL) 50,000 unit Cap Take 1 capsule (50,000 Units total) by mouth every 7 days.    famotidine (PEPCID) 20 MG tablet Take 1 tablet (20 mg total) by mouth every evening.    fluconazole (DIFLUCAN) 200 MG Tab Take 1 tablet (200 mg total) by mouth once daily. STOP 6/23/18    gabapentin (NEURONTIN) 100 MG capsule Take 1 capsule (100 mg total) by mouth every evening.    levETIRAcetam (KEPPRA) 500 MG Tab Take 1 tablet (500 mg total) by mouth 2 (two) times daily.    levothyroxine (SYNTHROID) 75 MCG tablet Take 1 tablet (75 mcg total) by mouth before breakfast.    metoclopramide HCl (REGLAN) 10 MG tablet Take 1 tablet (10 mg total) by mouth 4 (four) times daily before meals and nightly.    oxyCODONE (ROXICODONE) 10 mg Tab immediate release tablet Take 0.5-1 tablets (5-10 mg total) by mouth every 4 (four) hours as needed for Pain.    predniSONE (DELTASONE) 20 MG tablet Take daily by mouth: 60mg 5/21-5/27, 50mg (2.5 tab) 5/28-6/3, 40mg (2 tab) 6/4-6/10, 30mg (1.5 tab) 6/11-6/17, 20mg (1 tab) 6/18-6/24, then 10mg (0.5 tab) daily beginning 6/25    sevelamer carbonate (RENVELA) 800 mg Tab Take 1 tablet (800 mg total) by mouth 3 (three) times daily with meals.    ursodiol (ACTIGALL) 300 mg capsule Take 1 capsule (300 mg total) by mouth 2 (two) times daily.    docusate sodium (COLACE) 100 MG capsule Take 1 capsule (100 mg total) by mouth 2 (two) times daily.    ondansetron (ZOFRAN-ODT) 8 MG TbDL Take 1 tablet (8 mg total) by mouth every 8 (eight) hours as needed (nausea).     Psychotherapeutics     Start     Stop Route Frequency Ordered    05/31/18 2100  risperiDONE tablet 1 mg      -- Oral Nightly 05/31/18 1533    05/27/18 2116  ramelteon tablet 8 mg      -- Oral Nightly PRN 05/27/18 2019        Review of Systems  Strengths and Liabilities: Strength: Patient is expressive/articulate., Strength: Patient has  "positive support network.    Objective:     Vital Signs (Most Recent):  Temp: 98.8 °F (37.1 °C) (06/01/18 0831)  Pulse: 86 (06/01/18 0831)  Resp: 16 (06/01/18 0831)  BP: 132/80 (06/01/18 0831)  SpO2: 98 % (06/01/18 0831) Vital Signs (24h Range):  Temp:  [98.8 °F (37.1 °C)-99.6 °F (37.6 °C)] 98.8 °F (37.1 °C)  Pulse:  [] 86  Resp:  [16-20] 16  SpO2:  [97 %-98 %] 98 %  BP: (132-160)/(80-98) 132/80     Height: 5' 6" (167.6 cm)  Weight: 65.2 kg (143 lb 11.8 oz)  Body mass index is 23.2 kg/m².      Intake/Output Summary (Last 24 hours) at 06/01/18 0857  Last data filed at 05/31/18 1825   Gross per 24 hour   Intake              360 ml   Output              480 ml   Net             -120 ml       Physical Exam      Mental Status Exam:  Appearance: age appropriate, lying in bed  Behavior/Cooperation: friendly and cooperative, eye contact normal  Speech: normal tone, normal rate, normal pitch, normal volume  Language: uses words appropriately; NO aphasia or dysarthria  Mood: steady  Affect:  congruent with mood and appropriate to situation/content   Thought Process: normal and logical  Thought Content: normal, no suicidality, no homicidality, delusions, or paranoia  Level of Consciousness: Alert and Oriented x3  Memory:  Grossly Intact  Attention/concentration: appropriate for age/education.   Fund of Knowledge: appears adequate  Insight: Intact  Judgment: Intact    Significant Labs:   Last 24 Hours:   Recent Lab Results       06/01/18  0506      Immature Granulocytes 0.6(H)     Immature Grans (Abs) 0.03  Comment:  Mild elevation in immature granulocytes is non specific and   can be seen in a variety of conditions including stress response,   acute inflammation, trauma and pregnancy. Correlation with other   laboratory and clinical findings is essential.       Albumin 2.4(L)     Alkaline Phosphatase 100     ALT 11     Anion Gap 10     Aniso Slight     AST 17     Baso # 0.00     Basophil% 0.0     Total Bilirubin " 1.1  Comment:  For infants and newborns, interpretation of results should be based  on gestational age, weight and in agreement with clinical  observations.  Premature Infant recommended reference ranges:  Up to 24 hours.............<8.0 mg/dL  Up to 48 hours............<12.0 mg/dL  3-5 days..................<15.0 mg/dL  6-29 days.................<15.0 mg/dL  (H)     BUN, Bld 72(H)     Calcium 8.5(L)     Chloride 109     Cholesterol 140  Comment:  The National Cholesterol Education Program (NCEP) has set the  following guidelines (reference ranges) for Cholesterol:  Optimal.....................<200 mg/dL  Borderline High.............200-239 mg/dL  High........................> or = 240 mg/dL       CO2 20(L)     Creatinine 3.3(H)     Differential Method Automated     eGFR if  27.8(A)     eGFR if non  24.1  Comment:  Calculation used to obtain the estimated glomerular filtration  rate (eGFR) is the CKD-EPI equation.   (A)     Eos # 0.0     Eosinophil% 0.0     Glucose 104     Gran # (ANC) 4.1     Gran% 78.2(H)     HDL 47  Comment:  The National Cholesterol Education Program (NCEP) has set the  following guidelines (reference values) for HDL Cholesterol:  Low...............<40 mg/dL  Optimal...........>60 mg/dL       HDL/Chol Ratio 33.6     Hematocrit 21.7(L)     Hemoglobin 7.0(L)     Hypo Occasional     Coumadin Monitoring INR 1.0  Comment:  Coumadin Therapy:  2.0 - 3.0 for INR for all indicators except mechanical heart valves  and antiphospholipid syndromes which should use 2.5 - 3.5.       LDL Cholesterol 75.2  Comment:  The National Cholesterol Education Program (NCEP) has set the  following guidelines (reference values) for LDL Cholesterol:  Optimal.......................<130 mg/dL  Borderline High...............130-159 mg/dL  High..........................160-189 mg/dL  Very High.....................>190 mg/dL       Lymph # 0.7(L)     Lymph% 13.0(L)     Magnesium 2.6     MCH 32.4(H)      MCHC 32.3     (H)     Mono # 0.4     Mono% 8.2     MPV 12.2     Non-HDL Cholesterol 93  Comment:  Risk category and Non-HDL cholesterol goals:  Coronary heart disease (CHD)or equivalent (10-year risk of CHD >20%):  Non-HDL cholesterol goal     <130 mg/dL  Two or more CHD risk factors and 10-year risk of CHD <= 20%:  Non-HDL cholesterol goal     <160 mg/dL  0 to 1 CHD risk factor:  Non-HDL cholesterol goal     <190 mg/dL       nRBC 0     Ovalocytes Occasional     Phosphorus 5.2(H)     Platelet Estimate Decreased(A)     Platelets 13  Comment:  PLT critical result(s) called and verbal readback obtained from Anyi Balbuena RN, 06/01/2018 08:00  (LL)     Poik Slight     Poly Occasional     Potassium 4.8     Total Protein 5.4(L)     Protime 10.8     RBC 2.16(L)     RDW 15.9(H)     Schistocytes Present     Sodium 139     Tear Drop Cells Occasional     Total Cholesterol/HDL Ratio 3.0     Triglycerides 89  Comment:  The National Cholesterol Education Program (NCEP) has set the  following guidelines (reference values) for triglycerides:  Normal......................<150 mg/dL  Borderline High.............150-199 mg/dL  High........................200-499 mg/dL       WBC 5.25           Significant Imaging: None

## 2018-06-01 NOTE — ASSESSMENT & PLAN NOTE
- patient with chronic constipation, no BM x3 days  - stool softeners ordered  - lactulose, suppository, and kayexalate given without response. Brown bomb enema  Successfully given 5/29 and 5/30  - miralax added to regimen   - Golytely started 6/1  - see above for further details.

## 2018-06-01 NOTE — ASSESSMENT & PLAN NOTE
- admit with abdominal pain  - denies N/V  - xray with significant stool, no obstruction noted  - LFTs stable  - CT A/P with ascites, anasarca, and moderate stool. No other significant/acute findings noted.   - lactulose, suppository, and kayexalate given without response. Brown bomb enema given 5/29 resulting in small-medium BM. Given again 5/30 x2 per patient request with BMs noted   - KUB 5/31 still  With significant amounts of stool --> Miralax ordered with minimal result   - Golytely ordered 6/1 as well as brown bomb enema   - Explained again need to withhold narcotics as constipation felt to be source of patient's pain

## 2018-06-01 NOTE — NURSING
Pt with first BM as a result of GoLYTEly intake: soft, formed, medium to dark brown, some red streaks.  RN notified LUISANA Vera PA-C, of the red streaks and lack of indications of GI bleeding.  No new orders received at this time.  RN will continue to monitor.

## 2018-06-01 NOTE — ASSESSMENT & PLAN NOTE
- nephrology following  - outpatient HD MF schedule  - patient to HD 5/28, but adamant about being taken off after 45 minutes.     -with hyperkalemia d/t incomplete HD overnight. Given insulin/dextrose, calcium gluconate, diuretics, and bicarb with improvement.  - Pt to HD again 5/29 for electrolyte and toxin clearance and volume removal.   - restart epo for HD days  - patient with more anxiety/pain at HD  - Hold HD today to focus on treating pain/constipation

## 2018-06-01 NOTE — MEDICAL/APP STUDENT
6/1/2018 9:26 AM   Jhonny Lebron   1990   [id number]        PSYCHIATRY CONSULT PROGRESS NOTE       BRIEF HPI     Patient is a 28 yo M with a past medical history of liver transplant complicated by biliary stricture.  He has been re-admitted numerous times in the last year. He presented to the ED on 5/27/2018 for worsening abdominal pain and distention.  The patient has a past psychiatric history of anxiety when he was 16 yo. At that time he was placed on Prozac but did not tolerate the side effects. Patient started becoming confused and overwhelmed on 5/28/2018 during HD treatment. Yesterday, the patient had periods of confusion with AAO x2 and had delayed responses. Patient believed that he had already completed certians tasks during the day. Patient had a BUN 84 prior to agitated episode yesterday prior to dialysis that could contribute to a delirious state.      Interval History:  (5/30/2018): The patient states that he has been feeling down about not knowing what will happen in regards to his health in the future.  The patient became tearful when discussing his medical problems.  He looks forward to getting out of the hospital to read, write, and play video games. He feels most hopeless when receiving hemodialysis.  The patient denies suicidal ideation.  The patient has had decreased sleep for the past 2 weeks and often wakes up at night with SOB and palpitation. Patient had a negative CAM-ICU.    (5/31/2018): Pt reports that he is in a good mood this morning. The patient appeared to have a flat affect.  Patient was oriented to person, place, time. He had decreased concentration with failure of serial 7's.  His thought content was normal with no depression and SI/HI.  The patient;s thought process was logical and coherent.  The patient does not report hallucinations, illusions, or delusions.  The patient was CAM-ICU negative with passing SAVEAHAART. EKG findings showed a QTc of 418. Increased Risperidone to 1  mg PO.     ASSESSMENT     Delirium  - patient did not have confusion overnight but did become emotional  - CAM-ICU negative and passed SAVEAHAART  - patient takes multiple walks during the day with his mother  - sleep got more sleep than previous nights       RECOMMENDATIONS      · PSYCH Meds- continue Risperidone 1 mg PO and Keppra 500 mg PO  · Legal status- N/A  · The above will be discussed with staff psychiatrist and update any changes after rounds in the afternoon.  · Thank you for this consult will continue to follow      ----------------------------------------------------------------------------------------------------------------------  SUBJECTIVE:     Nursing note: Patient requested more pain medication.  The patient was unable to received pain medication at that time.  Per RN, the patient became tearful and the RN provided emotional support. The patient understood why he wasn't able to receive the medication at that time.     Today,  Pt reports he is feeling okay despite the abdominal pain.  He states that the medication does help with the pain.  He was able to sleep more last night compared to previous nights.  The patient's mother did not notice any confusion last night.  She mainly was concerned about controlling his pain to help him sleep more at night.  The patient was oriented x4 to person, place, time, and situation.  The patient notes increased appetite.  MSE exam showed that a thin, but alert male with normal speech.  His affect was appropriate with his mood.  There were no thoughts of SI/HI.  His thought process was logical and coherent.  He denies hallucinations, delusions, or illusions.  The patient was CAM-ICU negative with passing the SAVEAHAART.         Current Medications:   Scheduled Meds:    sodium chloride 0.9%   Intravenous Once    albumin human 25%  25 g Intravenous Once    amLODIPine  5 mg Oral Daily    bisacodyl  10 mg Oral Daily    docusate sodium  100 mg Oral BID     dronabinol  2.5 mg Oral QHS    epoetin maurisio  20,000 Units Subcutaneous Every Mon, Fri    famotidine  20 mg Oral QHS    fluconazole  200 mg Oral Daily    gabapentin  100 mg Oral QHS    glycerin 99.5%  100 mL Rectal Once    And    magnesium citrate  300 mL Rectal Once    And    sodium chloride 0.9%  500 mL Rectal Once    levETIRAcetam  500 mg Oral BID    levothyroxine  75 mcg Oral Before breakfast    polyethylene glycol  17 g Oral Daily    predniSONE  60 mg Oral Daily    risperiDONE  1 mg Oral QHS    sevelamer carbonate  1,600 mg Oral TID WM    sodium chloride 0.9%  500 mL Rectal Once    ursodiol  300 mg Oral BID      PRN Meds: acetaminophen, dextrose 50%, diphenhydrAMINE, gentamicin, glucagon (human recombinant), ondansetron, oxyCODONE, ramelteon, sodium chloride 0.9%   Psychotherapeutics     Start     Stop Route Frequency Ordered    05/31/18 2100  risperiDONE tablet 1 mg      -- Oral Nightly 05/31/18 1533    05/27/18 2116  ramelteon tablet 8 mg      -- Oral Nightly PRN 05/27/18 2019          Allergies:   Review of patient's allergies indicates:   Allergen Reactions    Bactrim [sulfamethoxazole-trimethoprim] Other (See Comments)     Mookie Trell Syndrome        Past Medical History:   Diagnosis Date    Alcoholic hepatitis with ascites     Alcoholic hepatitis with ascites     ESRD on dialysis     History of hematemesis 9/28/2017    Hypertension     Renal disorder     Seizures        OBJECTIVE:   Vitals   Vitals:    06/01/18 0831   BP: 132/80   Pulse: 86   Resp: 16   Temp: 98.8 °F (37.1 °C)        Labs/Imaging/Studies:   Recent Results (from the past 36 hour(s))   Comprehensive metabolic panel    Collection Time: 05/31/18  5:04 AM   Result Value Ref Range    Sodium 139 136 - 145 mmol/L    Potassium 4.0 3.5 - 5.1 mmol/L    Chloride 107 95 - 110 mmol/L    CO2 21 (L) 23 - 29 mmol/L    Glucose 83 70 - 110 mg/dL    BUN, Bld 64 (H) 6 - 20 mg/dL    Creatinine 3.0 (H) 0.5 - 1.4 mg/dL    Calcium 8.3 (L)  8.7 - 10.5 mg/dL    Total Protein 5.8 (L) 6.0 - 8.4 g/dL    Albumin 2.5 (L) 3.5 - 5.2 g/dL    Total Bilirubin 1.4 (H) 0.1 - 1.0 mg/dL    Alkaline Phosphatase 101 55 - 135 U/L    AST 18 10 - 40 U/L    ALT 12 10 - 44 U/L    Anion Gap 11 8 - 16 mmol/L    eGFR if African American 31.2 (A) >60 mL/min/1.73 m^2    eGFR if non  27.0 (A) >60 mL/min/1.73 m^2   Magnesium    Collection Time: 05/31/18  5:04 AM   Result Value Ref Range    Magnesium 1.9 1.6 - 2.6 mg/dL   Phosphorus    Collection Time: 05/31/18  5:04 AM   Result Value Ref Range    Phosphorus 5.5 (H) 2.7 - 4.5 mg/dL   CBC auto differential    Collection Time: 05/31/18  5:04 AM   Result Value Ref Range    WBC 5.11 3.90 - 12.70 K/uL    RBC 2.39 (L) 4.60 - 6.20 M/uL    Hemoglobin 7.7 (L) 14.0 - 18.0 g/dL    Hematocrit 24.3 (L) 40.0 - 54.0 %     (H) 82 - 98 fL    MCH 32.2 (H) 27.0 - 31.0 pg    MCHC 31.7 (L) 32.0 - 36.0 g/dL    RDW 16.3 (H) 11.5 - 14.5 %    Platelets 18 (LL) 150 - 350 K/uL    MPV 12.7 9.2 - 12.9 fL    Immature Granulocytes 0.8 (H) 0.0 - 0.5 %    Gran # (ANC) 3.5 1.8 - 7.7 K/uL    Immature Grans (Abs) 0.04 0.00 - 0.04 K/uL    Lymph # 0.9 (L) 1.0 - 4.8 K/uL    Mono # 0.6 0.3 - 1.0 K/uL    Eos # 0.0 0.0 - 0.5 K/uL    Baso # 0.00 0.00 - 0.20 K/uL    nRBC 0 0 /100 WBC    Gran% 69.3 38.0 - 73.0 %    Lymph% 18.2 18.0 - 48.0 %    Mono% 11.5 4.0 - 15.0 %    Eosinophil% 0.2 0.0 - 8.0 %    Basophil% 0.0 0.0 - 1.9 %    Aniso Slight     Poik Slight     Poly Occasional     Hypo Occasional     Ovalocytes Occasional     Tear Drop Cells Occasional     [place] Cells Occasional     Fragmented Cells Occasional     Differential Method Automated    Protime-INR    Collection Time: 05/31/18  5:04 AM   Result Value Ref Range    Prothrombin Time 10.5 9.0 - 12.5 sec    INR 1.0 0.8 - 1.2   Cyclosporine level    Collection Time: 05/31/18  5:04 AM   Result Value Ref Range    Cyclosporine, LC/MS 22 (L) 100 - 400 ng/mL   Comprehensive metabolic panel    Collection  Time: 06/01/18  5:06 AM   Result Value Ref Range    Sodium 139 136 - 145 mmol/L    Potassium 4.8 3.5 - 5.1 mmol/L    Chloride 109 95 - 110 mmol/L    CO2 20 (L) 23 - 29 mmol/L    Glucose 104 70 - 110 mg/dL    BUN, Bld 72 (H) 6 - 20 mg/dL    Creatinine 3.3 (H) 0.5 - 1.4 mg/dL    Calcium 8.5 (L) 8.7 - 10.5 mg/dL    Total Protein 5.4 (L) 6.0 - 8.4 g/dL    Albumin 2.4 (L) 3.5 - 5.2 g/dL    Total Bilirubin 1.1 (H) 0.1 - 1.0 mg/dL    Alkaline Phosphatase 100 55 - 135 U/L    AST 17 10 - 40 U/L    ALT 11 10 - 44 U/L    Anion Gap 10 8 - 16 mmol/L    eGFR if African American 27.8 (A) >60 mL/min/1.73 m^2    eGFR if non  24.1 (A) >60 mL/min/1.73 m^2   Magnesium    Collection Time: 06/01/18  5:06 AM   Result Value Ref Range    Magnesium 2.6 1.6 - 2.6 mg/dL   Phosphorus    Collection Time: 06/01/18  5:06 AM   Result Value Ref Range    Phosphorus 5.2 (H) 2.7 - 4.5 mg/dL   CBC auto differential    Collection Time: 06/01/18  5:06 AM   Result Value Ref Range    WBC 5.25 3.90 - 12.70 K/uL    RBC 2.16 (L) 4.60 - 6.20 M/uL    Hemoglobin 7.0 (L) 14.0 - 18.0 g/dL    Hematocrit 21.7 (L) 40.0 - 54.0 %     (H) 82 - 98 fL    MCH 32.4 (H) 27.0 - 31.0 pg    MCHC 32.3 32.0 - 36.0 g/dL    RDW 15.9 (H) 11.5 - 14.5 %    Platelets 13 (LL) 150 - 350 K/uL    MPV 12.2 9.2 - 12.9 fL    Immature Granulocytes 0.6 (H) 0.0 - 0.5 %    Gran # (ANC) 4.1 1.8 - 7.7 K/uL    Immature Grans (Abs) 0.03 0.00 - 0.04 K/uL    Lymph # 0.7 (L) 1.0 - 4.8 K/uL    Mono # 0.4 0.3 - 1.0 K/uL    Eos # 0.0 0.0 - 0.5 K/uL    Baso # 0.00 0.00 - 0.20 K/uL    nRBC 0 0 /100 WBC    Gran% 78.2 (H) 38.0 - 73.0 %    Lymph% 13.0 (L) 18.0 - 48.0 %    Mono% 8.2 4.0 - 15.0 %    Eosinophil% 0.0 0.0 - 8.0 %    Basophil% 0.0 0.0 - 1.9 %    Platelet Estimate Decreased (A)     Aniso Slight     Poik Slight     Poly Occasional     Hypo Occasional     Ovalocytes Occasional     Tear Drop Cells Occasional     Schistocytes Present     Differential Method Automated    Protime-INR     Collection Time: 06/01/18  5:06 AM   Result Value Ref Range    Prothrombin Time 10.8 9.0 - 12.5 sec    INR 1.0 0.8 - 1.2   Lipid panel    Collection Time: 06/01/18  5:06 AM   Result Value Ref Range    Cholesterol 140 120 - 199 mg/dL    Triglycerides 89 30 - 150 mg/dL    HDL 47 40 - 75 mg/dL    LDL Cholesterol 75.2 63.0 - 159.0 mg/dL    HDL/Chol Ratio 33.6 20.0 - 50.0 %    Total Cholesterol/HDL Ratio 3.0 2.0 - 5.0    Non-HDL Cholesterol 93 mg/dL          Medical ROS  General ROS: negative for - chills, fatigue or fever  Respiratory ROS: no cough, shortness of breath, or wheezing  Cardiovascular ROS: no chest pain or dyspnea on exertion  Gastrointestinal ROS: abdominal pain, no change in bowel habits, or black or bloody stools  Musculoskeletal ROS: negative for - gait disturbance, joint stiffness, muscle pain or muscular weakness  Neurological ROS: negative for - confusion, dizziness, gait disturbance, headaches, impaired coordination/balance, seizures or visual changes      Mental Status Exam:  Appearance: age appropriate, thin, seated in chair  Behavior/Cooperation: limited/ appopriate friendly and cooperative  Speech: appropriate rate, volume and tone normal tone, normal rate, normal pitch, normal volume  Language: grossly intact, able to name, able to repeat with spontaneous speech  Mood: euthymic  Affect:  congruent with mood and appropriate to situation/content Normal  Thought Process: normal and logical  Thought Content: normal, no suicidality, no homicidality, delusions, or paranoia  Sensorium:  Awake/Delirium/Somnolence  Alert and Oriented: x4 grossly intact, person, place, situation, time/date  Memory: 3/3 immediate, 3/3 at 5 minutes    Recent:  Intact; able to report recent events   Remote:  Limited; Named 2/4 past presidents   Attention/concentration:Limited: Serials of 7's able to say 93, 86, 79. Able to spell [id number] but not [id number]   Similarities:  Intact; differences intact  Abstract reasoning:   Intact  Insight:  Intact  Judgment: Intact      RASS- 0  CAM ICU- negative       Nicole Clifford, M3  6/1/2018 9:26 AM

## 2018-06-01 NOTE — PROGRESS NOTES
[facility]  Liver Transplant  Progress Note    Patient Name: Jhonny Diana  MRN: [id number]  Admission Date: 2018  Hospital Length of Stay: 5 days  Code Status: Full Code  Primary Care Provider: Primary Doctor No  Post-Operative Day: 225    ORGAN:   LIVER  Disease Etiology: Acute Alcoholic Hepatitis  Donor Type:    - Brain Death  CDC High Risk:   No  Donor CMV Status:   Donor CMV Status: Positive  Donor HBcAB:   Negative  Donor HCV Status:   Negative  Whole or Partial: Whole Liver  Biliary Anastomosis: End to End  Arterial Anatomy: Standard  Subjective:     History of Present Illness:  Mr. 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  S/p OLTX 10/19/2017 (Liver) with steroid induction. Post op course with multiple complications to include biliary stricture, FUO, ESRD, and chronic abdominal pain. Recently discharged on  after workup for abdominal pain and thrombocytopenia. He presented  with worsening abdominal pain since Friday and distention. Denies nausea, vomiting, sob, cp, fevers, or any other new or concerning symptoms. Reports no BM x3 days.        Hospital Course:  Hospital Course: Patient with persistent abdominal pain and no BM despite lactulose and suppository. Unable to tolerate HD d/t agitation and abdominal discomfort- requested to be taken off. Potassium elevated since HD not completed. Shifted with insulin/dextrose, calcium gluc, bicarb, and diuretics.  Potassium improved following. Patient tearful and overwhelmed. Still with some abnormal mouth and hand twitching, odd comments, and confusion. Able to toelrate HD on  with reassurance and assistance from his mom. Mental status much improved following HD- pt less sluggish, quicker to respond, and responding appropriately. Psych consult per pt request for anxiety and depression. Risperdal started 18.     Interval history: No acute events overnight, patient reports minimal sleep. Mental status  remains with improvement.  Txp team with psych team 5/30, started Risperdal. Tolerating diet well. Requested enema x2 5/30, both resulting in smal BM. KUB 5/31 still with significant amounts of stool. Miralax started with minimal result. Golytely ordered today.  Pt reports that he is having signif back/abd pain especially during HD.  He is requesting additional pain medication prior to HD and states he will be unable to undergo HD tx if no additional pain medication given.  Narcotics are likely patient's source of severe constipation and pain which was again explained to patient.  Will hold off on HD today as without acute indication and attempt to relieve constipation to reassess pain.  Pt amenable to this.  LFTs remains stable. Continuing to hold cyclosporine for now, plan to start Rapa today at 1 mg as plt severely low to start. Hematology following. Retic and LDH improved from last admission.     Scheduled Meds:   sodium chloride 0.9%   Intravenous Once    amLODIPine  5 mg Oral Daily    bisacodyl  10 mg Oral Daily    docusate sodium  100 mg Oral BID    dronabinol  2.5 mg Oral QHS    epoetin maurisio  20,000 Units Subcutaneous Every Mon, Fri    famotidine  20 mg Oral QHS    fluconazole  200 mg Oral Daily    gabapentin  100 mg Oral QHS    glycerin 99.5%  100 mL Rectal Once    And    magnesium citrate  300 mL Rectal Once    And    sodium chloride 0.9%  500 mL Rectal Once    levETIRAcetam  500 mg Oral BID    levothyroxine  75 mcg Oral Before breakfast    polyethylene glycol  17 g Oral Daily    predniSONE  60 mg Oral Daily    risperiDONE  1 mg Oral QHS    sevelamer carbonate  1,600 mg Oral TID WM    sirolimus  1 mg Oral Daily    sodium chloride 0.9%  500 mL Rectal Once    ursodiol  300 mg Oral BID     Continuous Infusions:  PRN Meds:acetaminophen, dextrose 50%, diphenhydrAMINE, gentamicin, glucagon (human recombinant), ondansetron, oxyCODONE, ramelteon, sodium chloride 0.9%    Review of Systems    Constitutional: Positive for fatigue. Negative for activity change, appetite change and chills.   HENT: Negative.    Eyes: Negative.    Respiratory: Negative for chest tightness and shortness of breath.    Cardiovascular: Positive for leg swelling. Negative for palpitations.   Gastrointestinal: Positive for abdominal distention, abdominal pain and constipation. Negative for nausea and vomiting.   Genitourinary: Negative.    Musculoskeletal: Positive for back pain. Negative for arthralgias.   Skin: Negative.    Allergic/Immunologic: Positive for immunocompromised state.   Neurological: Negative.    Psychiatric/Behavioral: Positive for sleep disturbance. Negative for confusion and decreased concentration. The patient is nervous/anxious.      Objective:     Vital Signs (Most Recent):  Temp: 98.8 °F (37.1 °C) (06/01/18 0831)  Pulse: 86 (06/01/18 0831)  Resp: 16 (06/01/18 0831)  BP: 132/80 (06/01/18 0831)  SpO2: 98 % (06/01/18 0831) Vital Signs (24h Range):  Temp:  [98.8 °F (37.1 °C)-99.6 °F (37.6 °C)] 98.8 °F (37.1 °C)  Pulse:  [] 86  Resp:  [16-20] 16  SpO2:  [97 %-98 %] 98 %  BP: (132-160)/(80-98) 132/80     Weight: 65.2 kg (143 lb 11.8 oz)  Body mass index is 23.2 kg/m².    Intake/Output - Last 3 Shifts       05/30 0700 - 05/31 0659 05/31 0700 - 06/01 0659 06/01 0700 - 06/02 0659    P.O. 240 360     I.V. (mL/kg)  0 (0)     Blood 350      Other  0     Total Intake(mL/kg) 590 (9) 360 (5.5)     Urine (mL/kg/hr) 0 (0) 740 (0.5) 1310 (2.9)    Emesis/NG output 0 (0) 0 (0) 0 (0)    Other 0 (0) 0 (0) 0 (0)    Stool 0 (0) 0 (0) 0 (0)    Blood 0 (0) 0 (0) 0 (0)    Total Output 0 740 1310    Net +590 -380 -1310           Urine Occurrence 1 x 0 x 0 x    Stool Occurrence 2 x 0 x 0 x    Emesis Occurrence 0 x 0 x 0 x          Physical Exam   Constitutional: He is oriented to person, place, and time. Vital signs are normal. He appears well-developed and well-nourished.   Chronic ill appearing    HENT:   Head: Normocephalic  and atraumatic.   Right Ear: Hearing, tympanic membrane and ear canal normal.   Left Ear: Hearing, tympanic membrane and ear canal normal.   Nose: Nose normal.   Mouth/Throat: Uvula is midline.   Eyes: Pupils are equal, round, and reactive to light.   Neck: Trachea normal, normal range of motion and full passive range of motion without pain. No JVD present.   Cardiovascular: Normal rate, regular rhythm, normal heart sounds and normal pulses.    No murmur heard.  Pulmonary/Chest: Effort normal and breath sounds normal. No respiratory distress. He has no wheezes. He has no rales. He exhibits no tenderness.   Diminished at bases.    Abdominal: Soft. Normal appearance and bowel sounds are normal. He exhibits distension. He exhibits no ascites. There is tenderness. There is no rigidity, no rebound, no guarding and no CVA tenderness.   Musculoskeletal: Normal range of motion. He exhibits edema (BLE ). He exhibits no tenderness.   Neurological: He is alert and oriented to person, place, and time. He has normal reflexes.   Skin: Skin is warm and dry.   R IJ permacath   Psychiatric: He has a normal mood and affect. His speech is normal and behavior is normal. Judgment and thought content normal. Cognition and memory are normal.       Laboratory:  Immunosuppressants         Stop Route Frequency     sirolimus tablet 1 mg      -- Oral Daily        CBC:     Recent Labs  Lab 06/01/18  0506   WBC 5.25   RBC 2.16*   HGB 7.0*   HCT 21.7*   PLT 13*   *   MCH 32.4*   MCHC 32.3     CMP:     Recent Labs  Lab 06/01/18  0506      CALCIUM 8.5*   ALBUMIN 2.4*   PROT 5.4*      K 4.8   CO2 20*      BUN 72*   CREATININE 3.3*   ALKPHOS 100   ALT 11   AST 17   BILITOT 1.1*     Labs within the past 24 hours have been reviewed.    Diagnostic Results:  pertinent imaging reviewed.     Assessment/Plan:     * Abdominal pain    - admit with abdominal pain  - denies N/V  - xray with significant stool, no obstruction noted  -  "LFTs stable  - CT A/P with ascites, anasarca, and moderate stool. No other significant/acute findings noted.   - lactulose, suppository, and kayexalate given without response. Brown bomb enema given 5/29 resulting in small-medium BM. Given again 5/30 x2 per patient request with BMs noted   - KUB 5/31 still  With significant amounts of stool --> Miralax ordered with minimal result   - Golytely ordered 6/1 as well as brown bomb enema   - Explained again need to withhold narcotics as constipation felt to be source of patient's pain          Liver transplanted    - S/p liver transplant with multiple admissions and last ERCP 5/4   - stent in place per imaging  - LFTs stable  - monitor         Long-term use of immunosuppressant medication    - Pt on  steroids. Continue to monitor for therapeutic levels and toxicities. Adjust doses accordingly.  - Continuing to hold cyclo for mental status changes and hemolytic anemia/thrombocytopenia   - Start Rapamune 1 mg today- monitor pt's CBC closely as may not tolerated  - Check Rapa level on Monday  - Continue pred 60 mg until Monday to support pt's blood counts, then start taper of 10 mg weekly if he tolerates- pt to continue 20 mg once tapers to this dose          Prophylactic immunotherapy    - please see long term IS        At risk for opportunistic infections    - pentamidine for pcp prophylaxis (last treatment 5/21)  - valcyte on hold as neutropenic, continue surveillance CMV PCR ( last checked 5/22, undetected). CMV PCR 5/29, pending  - fluconazole for antigungal          Anxiety about health    - Pt admitted with cognitive slowing, confusion, being "off", twitchy mouth movements, and slight hand tremors  - CT negative for acute processes  - Bcx NGTD, UA clean, UCx pending.  - Cont hold cyclo for now  - jerky movements could be related to reglan (?tardive dyskinesias), will hold  - mental status much improved today. Less sluggish with appropriate responses. Still remains " tearful and emotional regarding future/current health situation   - dependent on mom and becomes emotional when talking about all she has done to help him   - pt requested psych consult for establishment of relationship for outpatient psychotherapy. Consult placed. appreciate assistance.            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  Per hematology this may be reactive to underlying organ dysfunction, drug and or peripheral destruction (specifically referring to platelets  - HIT panel negative previously.   - Per previous Heme recommendations on last admit, cont with steroids  - restarted cyclosporine 5/21  - hematology re consulted this admission, appreciate recs and assistance.    - continue steroids   - transfuse for PLTS <10  - received 1 pack PLTs 5/28 d/t PLT count of 9. Responded well.           Anemia of chronic renal failure    - h/h chronically low, stable  - received 1 unit PRBC 5/30/18  - restart Epo on MW (HD days)  - monitor cbc daily  - hematology following, appreciate assistance           Constipation    - patient with chronic constipation, no BM x3 days  - stool softeners ordered  - lactulose, suppository, and kayexalate given without response. Brown bomb enema  Successfully given 5/29 and 5/30  - miralax added to regimen   - Golytely started 6/1  - see above for further details.         ESRD Monday/Friday    - nephrology following  - outpatient HD MF schedule  - patient to HD 5/28, but adamant about being taken off after 45 minutes.     -with hyperkalemia d/t incomplete HD overnight. Given insulin/dextrose, calcium gluconate, diuretics, and bicarb with improvement.  - Pt to HD again 5/29 for electrolyte and toxin clearance and volume removal.   - restart epo for HD days  - patient with more anxiety/pain at HD  - Hold HD today to focus on treating  pain/constipation        Severe protein-calorie malnutrition    - continue dronabinol for appetite stimulant  - po supplements  - appetite improving since initiation of steroids  - reglan on hold d/t mental status and jerking movement changes          VTE Risk Mitigation         Ordered     Place ISMA hose  Until discontinued      05/27/18 2019     Place sequential compression device  Until discontinued      05/27/18 2019     Reason for No Pharmacological VTE Prophylaxis  Once      05/27/18 2019     IP VTE HIGH RISK PATIENT  Once      05/27/18 2019        The patients clinical status was discussed at multidisplinary rounds, involving transplant surgery, transplant medicine, pharmacy, nursing, nutrition, and social work    Discharge Planning:  Monitor kidney function --> may need outpt HD  Monitor HH needs vs rehab for deconditioned status      Geronimo Vera PA-C  Liver Transplant  [facility]
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
## 2018-06-01 NOTE — SUBJECTIVE & OBJECTIVE
Scheduled Meds:   sodium chloride 0.9%   Intravenous Once    amLODIPine  5 mg Oral Daily    bisacodyl  10 mg Oral Daily    docusate sodium  100 mg Oral BID    dronabinol  2.5 mg Oral QHS    epoetin maurisio  20,000 Units Subcutaneous Every Mon, Fri    famotidine  20 mg Oral QHS    fluconazole  200 mg Oral Daily    gabapentin  100 mg Oral QHS    glycerin 99.5%  100 mL Rectal Once    And    magnesium citrate  300 mL Rectal Once    And    sodium chloride 0.9%  500 mL Rectal Once    levETIRAcetam  500 mg Oral BID    levothyroxine  75 mcg Oral Before breakfast    polyethylene glycol  17 g Oral Daily    predniSONE  60 mg Oral Daily    risperiDONE  1 mg Oral QHS    sevelamer carbonate  1,600 mg Oral TID WM    sirolimus  1 mg Oral Daily    sodium chloride 0.9%  500 mL Rectal Once    ursodiol  300 mg Oral BID     Continuous Infusions:  PRN Meds:acetaminophen, dextrose 50%, diphenhydrAMINE, gentamicin, glucagon (human recombinant), ondansetron, oxyCODONE, ramelteon, sodium chloride 0.9%    Review of Systems   Constitutional: Positive for fatigue. Negative for activity change, appetite change and chills.   HENT: Negative.    Eyes: Negative.    Respiratory: Negative for chest tightness and shortness of breath.    Cardiovascular: Positive for leg swelling. Negative for palpitations.   Gastrointestinal: Positive for abdominal distention, abdominal pain and constipation. Negative for nausea and vomiting.   Genitourinary: Negative.    Musculoskeletal: Positive for back pain. Negative for arthralgias.   Skin: Negative.    Allergic/Immunologic: Positive for immunocompromised state.   Neurological: Negative.    Psychiatric/Behavioral: Positive for sleep disturbance. Negative for confusion and decreased concentration. The patient is nervous/anxious.      Objective:     Vital Signs (Most Recent):  Temp: 98.8 °F (37.1 °C) (06/01/18 0831)  Pulse: 86 (06/01/18 0831)  Resp: 16 (06/01/18 0831)  BP: 132/80 (06/01/18  0831)  SpO2: 98 % (06/01/18 0831) Vital Signs (24h Range):  Temp:  [98.8 °F (37.1 °C)-99.6 °F (37.6 °C)] 98.8 °F (37.1 °C)  Pulse:  [] 86  Resp:  [16-20] 16  SpO2:  [97 %-98 %] 98 %  BP: (132-160)/(80-98) 132/80     Weight: 65.2 kg (143 lb 11.8 oz)  Body mass index is 23.2 kg/m².    Intake/Output - Last 3 Shifts       05/30 0700 - 05/31 0659 05/31 0700 - 06/01 0659 06/01 0700 - 06/02 0659    P.O. 240 360     I.V. (mL/kg)  0 (0)     Blood 350      Other  0     Total Intake(mL/kg) 590 (9) 360 (5.5)     Urine (mL/kg/hr) 0 (0) 740 (0.5) 1310 (2.9)    Emesis/NG output 0 (0) 0 (0) 0 (0)    Other 0 (0) 0 (0) 0 (0)    Stool 0 (0) 0 (0) 0 (0)    Blood 0 (0) 0 (0) 0 (0)    Total Output 0 740 1310    Net +590 -380 -1310           Urine Occurrence 1 x 0 x 0 x    Stool Occurrence 2 x 0 x 0 x    Emesis Occurrence 0 x 0 x 0 x          Physical Exam   Constitutional: He is oriented to person, place, and time. Vital signs are normal. He appears well-developed and well-nourished.   Chronic ill appearing    HENT:   Head: Normocephalic and atraumatic.   Right Ear: Hearing, tympanic membrane and ear canal normal.   Left Ear: Hearing, tympanic membrane and ear canal normal.   Nose: Nose normal.   Mouth/Throat: Uvula is midline.   Eyes: Pupils are equal, round, and reactive to light.   Neck: Trachea normal, normal range of motion and full passive range of motion without pain. No JVD present.   Cardiovascular: Normal rate, regular rhythm, normal heart sounds and normal pulses.    No murmur heard.  Pulmonary/Chest: Effort normal and breath sounds normal. No respiratory distress. He has no wheezes. He has no rales. He exhibits no tenderness.   Diminished at bases.    Abdominal: Soft. Normal appearance and bowel sounds are normal. He exhibits distension. He exhibits no ascites. There is tenderness. There is no rigidity, no rebound, no guarding and no CVA tenderness.   Musculoskeletal: Normal range of motion. He exhibits edema (BLE ). He  exhibits no tenderness.   Neurological: He is alert and oriented to person, place, and time. He has normal reflexes.   Skin: Skin is warm and dry.   R IJ permacath   Psychiatric: He has a normal mood and affect. His speech is normal and behavior is normal. Judgment and thought content normal. Cognition and memory are normal.       Laboratory:  Immunosuppressants         Stop Route Frequency     sirolimus tablet 1 mg      -- Oral Daily        CBC:     Recent Labs  Lab 06/01/18  0506   WBC 5.25   RBC 2.16*   HGB 7.0*   HCT 21.7*   PLT 13*   *   MCH 32.4*   MCHC 32.3     CMP:     Recent Labs  Lab 06/01/18  0506      CALCIUM 8.5*   ALBUMIN 2.4*   PROT 5.4*      K 4.8   CO2 20*      BUN 72*   CREATININE 3.3*   ALKPHOS 100   ALT 11   AST 17   BILITOT 1.1*     Labs within the past 24 hours have been reviewed.    Diagnostic Results:  pertinent imaging reviewed.

## 2018-06-01 NOTE — ASSESSMENT & PLAN NOTE
- Pt on  steroids. Continue to monitor for therapeutic levels and toxicities. Adjust doses accordingly.  - Continuing to hold cyclo for mental status changes and hemolytic anemia/thrombocytopenia   - Start Rapamune 1 mg today- monitor pt's CBC closely as may not tolerated  - Check Rapa level on Monday  - Continue pred 60 mg until Monday to support pt's blood counts, then start taper of 10 mg weekly if he tolerates- pt to continue 20 mg once tapers to this dose

## 2018-06-01 NOTE — PROGRESS NOTES
[facility]  Renal Medicine  Progress Note    Patient Name: Jhonny Diana  MRN: [id number]  Patient Class: IP- Inpatient   Admission Date: 5/27/2018  Length of Stay: 5 days  Attending Physician: Nathanael Medina MD  Primary Care Provider: Primary Doctor No      Subjective:     Principal Problem:Abdominal pain  Interval History: Currently he is taking GoLYTELY, he denies any shortness of breath and chest pain symptoms except for abdominal pain    Review of Systems; as above otherwise negative  Objective:     Vital Signs (Most Recent):  Temp: 98.8 °F (37.1 °C) (06/01/18 0831)  Pulse: 86 (06/01/18 0831)  Resp: 16 (06/01/18 0831)  BP: 132/80 (06/01/18 0831)  SpO2: 98 % (06/01/18 0831) Vital Signs (24h Range):  Temp:  [98.8 °F (37.1 °C)-99.6 °F (37.6 °C)] 98.8 °F (37.1 °C)  Pulse:  [] 86  Resp:  [16-20] 16  SpO2:  [97 %-98 %] 98 %  BP: (132-160)/(80-98) 132/80     Weight: 65.2 kg (143 lb 11.8 oz)  Body mass index is 23.2 kg/m².    Intake/Output Summary (Last 24 hours) at 06/01/18 1525  Last data filed at 06/01/18 1431   Gross per 24 hour   Intake              810 ml   Output             1790 ml   Net             -980 ml      Physical Exam   General appearance: Well developed, well nourished  Head: Normocephalic, atraumatic  Eyes:  Conjunctivae nl. Sclera anicteric. PERRL.  HEENT: Lips, mucosa, and tongue normal; teeth and gums normal and oropharynx clear.  Neck: Supple, trachea midline, thyroid not enlarged,   Lungs: Clear to auscultation bilaterally and normal respiratory effort  Heart: Regular rate and rhythm, S1, S2 normal, no murmur, click, rub or gallop  Abdomen: Positive for abdominal tenderness  Extremities: No cyanosis or clubbing. No edema  Pulses: 2+ and symmetric  Skin: Skin color, texture, turgor normal. No rashes or lesions  Lymph nodes: Cervical, supraclavicular, and axillary nodes normal.  Neurologic: Normal strength and tone. No focal numbness or weakness  Psychiatric:  Alert and  oriented times 3.  Affect appropriate.    Significant Labs:   CMP:   Recent Labs  Lab 05/31/18  0504 06/01/18  0506    139   K 4.0 4.8    109   CO2 21* 20*   GLU 83 104   BUN 64* 72*   CREATININE 3.0* 3.3*   CALCIUM 8.3* 8.5*   PROT 5.8* 5.4*   ALBUMIN 2.5* 2.4*   BILITOT 1.4* 1.1*   ALKPHOS 101 100   AST 18 17   ALT 12 11   ANIONGAP 11 10   EGFRNONAA 27.0* 24.1*     Assessment/Plan:      Active Diagnoses:    Diagnosis Date Noted POA    PRINCIPAL PROBLEM:  Abdominal pain [R10.9] 05/27/2018 Yes    Acute confusional state [F05] 06/01/2018 Yes    Anxiety about health [F41.8] 05/30/2018 No    Thrombocytopenia [D69.6] 05/12/2018 Yes    Anemia of chronic renal failure [N18.9, D63.1]  Yes    Constipation [K59.00] 01/20/2018 Yes    ESRD Monday/Friday [N18.6] 01/13/2018 Yes    Severe protein-calorie malnutrition [E43] 01/11/2018 Yes    At risk for opportunistic infections [Z91.89] 10/26/2017 Yes    S/P liver transplant [Z94.4] 10/20/2017 Not Applicable     Chronic    Long-term use of immunosuppressant medication [Z79.899] 10/20/2017 Not Applicable     Chronic    Prophylactic immunotherapy [Z29.8] 10/20/2017 Not Applicable     Chronic      Problems Resolved During this Admission:    Diagnosis Date Noted Date Resolved POA     VTE Risk Mitigation         Ordered     Place ISMA hose  Until discontinued      05/27/18 2019     Place sequential compression device  Until discontinued      05/27/18 2019     Reason for No Pharmacological VTE Prophylaxis  Once      05/27/18 2019     IP VTE HIGH RISK PATIENT  Once      05/27/18 2019      1.  End-stage renal disease; we will do hemodialysis tomorrow, he is currently taking GoLYTELY, no need for urgent dialysis today.       Sergio Virk MD  Department of  Renal Medicine   [facility]

## 2018-06-01 NOTE — PLAN OF CARE
Problem: Patient Care Overview  Goal: Plan of Care Review  Outcome: Ongoing (interventions implemented as appropriate)  Pt AAOx4, VSS, in NAD throughout shift.  Pt up to restroom independently.  Pt mostly compliant but with complaints regarding GoLYTEly.  Pt does continue to drink the solution despite complaints.  RN administered pain med x1 so far this shift.  Pt with BM x1 so far this shift.  Pt ambulating in halls more than 3 times so far this shift.  Pt with no s/sx of bleeding despite low platelet count.  RN maintaining fall risk precautions throughout shift.  Pt denies pain or other need at this time; RN will continue to monitor, assess, and alter plan of care as needed until report given to oncoming night shift nurse.

## 2018-06-01 NOTE — NURSING
"0910: Pt, pt's mother, and this RN had a conversation this morning during which pt expressed himself regarding his treatment plan.  Pt states he is uninterested in treatment and intervention until an agreement can be made regarding pain medication dosing and administration.  RN cautions pt not to make "if-then" promises as it may not be conducive to his receiving of care overall.  Pt's mother reminds pt that medical staff is doing their best to both treat his condition and his pain but (accurately) reminds the pt of the cycle of pain medication and constipation that the pt is experiencing.  Pt states he does not care and he is not doing anything without pain medication, especially dialysis, and states if he is sent to dialysis, he would unplug himself from the machine.  Pt states, if he gets his way, he will "win."  Pt's mother states, "No, you won't, you'll be dead."  Pt states, "So?  Sorry."  RN providing emotional support and listens to both sides of the story throughout the conversation.  RN reassures pt and pt's mother and states she will do everything she can to speak to the MD and JUANCARLOS(s) prior to their rounds and to also be present during their rounding conversation.  Pt and pt's mother verbalize understanding and appear satisfied with this.      0925:  This RN speaks to today's charge RN, who notifies the LTS  of pt's statements.   agrees to provide this information during their rounds.      1100:  Pt's LTS team in his room for rounds, this RN present.  Pt restates his concerns to MD.  MD states his belief that resolving the constipation will assist with resolving the pain and that he is willing to work on better pain management once bowels move.  Pt agrees to this but states he will not go to dialysis without pain medication.  JUANCARLOS & MD agree that pt's dialysis need is not acute and that it can be delayed.  Pt states he is agreeable to this plan and is willing to try to get his bowels " "to move.  JUANCARLOS & MD state that they are also agreeable to this plan.  GoLYTEly ordered.    1215:  RN brings GoLYTEly into pt's room.  Pt states he is "afraid of it."  RN and pt's mother providing encouragement and reassurance that it "isn't that bad" compared to what would be necessary if pt was to receive a colonoscopy.  Pt continues to complain.  Pt taking small sips of his first 8 oz cup.  RN reinforcing the need to drink as much as he can, as efficiently as he can.  Pt states he "cannot do it" and that it is "torture" and asks, "Okay, really, how much of this do people actually drink?"  RN and pt's mother providing consistent encouragement of the need for the pt to drink the solution.  RN provides flavoring for the solution.  Pt continues to take small sips.      RN will continue to monitor.  "

## 2018-06-02 LAB
ABO + RH BLD: NORMAL
ALBUMIN SERPL BCP-MCNC: 2.5 G/DL
ALP SERPL-CCNC: 99 U/L
ALT SERPL W/O P-5'-P-CCNC: 10 U/L
ANION GAP SERPL CALC-SCNC: 9 MMOL/L
ANISOCYTOSIS BLD QL SMEAR: SLIGHT
AST SERPL-CCNC: 15 U/L
BACTERIA BLD CULT: NORMAL
BACTERIA BLD CULT: NORMAL
BASOPHILS # BLD AUTO: 0 K/UL
BASOPHILS NFR BLD: 0 %
BILIRUB SERPL-MCNC: 1.1 MG/DL
BLD GP AB SCN CELLS X3 SERPL QL: NORMAL
BLD PROD TYP BPU: NORMAL
BLOOD UNIT EXPIRATION DATE: NORMAL
BLOOD UNIT TYPE CODE: 5100
BLOOD UNIT TYPE: NORMAL
BUN SERPL-MCNC: 71 MG/DL
CALCIUM SERPL-MCNC: 8.4 MG/DL
CHLORIDE SERPL-SCNC: 106 MMOL/L
CO2 SERPL-SCNC: 25 MMOL/L
CODING SYSTEM: NORMAL
CREAT SERPL-MCNC: 3.2 MG/DL
CREAT UR-MCNC: 43 MG/DL
CYCLOSPORINE BLD LC/MS/MS-MCNC: <10 NG/ML
DIFFERENTIAL METHOD: ABNORMAL
DISPENSE STATUS: NORMAL
EOSINOPHIL # BLD AUTO: 0 K/UL
EOSINOPHIL NFR BLD: 0 %
ERYTHROCYTE [DISTWIDTH] IN BLOOD BY AUTOMATED COUNT: 16 %
EST. GFR  (AFRICAN AMERICAN): 28.9 ML/MIN/1.73 M^2
EST. GFR  (NON AFRICAN AMERICAN): 25 ML/MIN/1.73 M^2
GLUCOSE SERPL-MCNC: 109 MG/DL
HCT VFR BLD AUTO: 20.2 %
HGB BLD-MCNC: 6.4 G/DL
HYPOCHROMIA BLD QL SMEAR: ABNORMAL
IMM GRANULOCYTES # BLD AUTO: 0.04 K/UL
IMM GRANULOCYTES NFR BLD AUTO: 0.9 %
INR PPP: 1
LYMPHOCYTES # BLD AUTO: 0.7 K/UL
LYMPHOCYTES NFR BLD: 15.7 %
MAGNESIUM SERPL-MCNC: 2.5 MG/DL
MCH RBC QN AUTO: 32 PG
MCHC RBC AUTO-ENTMCNC: 31.7 G/DL
MCV RBC AUTO: 101 FL
MONOCYTES # BLD AUTO: 0.4 K/UL
MONOCYTES NFR BLD: 8.5 %
NEUTROPHILS # BLD AUTO: 3.5 K/UL
NEUTROPHILS NFR BLD: 74.9 %
NRBC BLD-RTO: 0 /100 WBC
NUM UNITS TRANS PACKED RBC: NORMAL
OVALOCYTES BLD QL SMEAR: ABNORMAL
PHOSPHATE SERPL-MCNC: 4 MG/DL
PLATELET # BLD AUTO: 15 K/UL
PMV BLD AUTO: 12.9 FL
POIKILOCYTOSIS BLD QL SMEAR: SLIGHT
POLYCHROMASIA BLD QL SMEAR: ABNORMAL
POTASSIUM SERPL-SCNC: 4.9 MMOL/L
PROT SERPL-MCNC: 5.3 G/DL
PROT UR-MCNC: 14 MG/DL
PROT/CREAT RATIO, UR: 0.33
PROTHROMBIN TIME: 10.8 SEC
RBC # BLD AUTO: 2 M/UL
SODIUM SERPL-SCNC: 140 MMOL/L
WBC # BLD AUTO: 4.7 K/UL

## 2018-06-02 PROCEDURE — 25000003 PHARM REV CODE 250: Mod: NTX | Performed by: STUDENT IN AN ORGANIZED HEALTH CARE EDUCATION/TRAINING PROGRAM

## 2018-06-02 PROCEDURE — 80053 COMPREHEN METABOLIC PANEL: CPT | Mod: NTX

## 2018-06-02 PROCEDURE — 90935 HEMODIALYSIS ONE EVALUATION: CPT | Mod: NTX,,, | Performed by: INTERNAL MEDICINE

## 2018-06-02 PROCEDURE — 80158 DRUG ASSAY CYCLOSPORINE: CPT | Mod: NTX

## 2018-06-02 PROCEDURE — 83735 ASSAY OF MAGNESIUM: CPT | Mod: NTX

## 2018-06-02 PROCEDURE — 85025 COMPLETE CBC W/AUTO DIFF WBC: CPT | Mod: NTX

## 2018-06-02 PROCEDURE — 90935 HEMODIALYSIS ONE EVALUATION: CPT | Mod: NTX

## 2018-06-02 PROCEDURE — 25000003 PHARM REV CODE 250: Mod: NTX | Performed by: PHYSICIAN ASSISTANT

## 2018-06-02 PROCEDURE — 84156 ASSAY OF PROTEIN URINE: CPT | Mod: NTX

## 2018-06-02 PROCEDURE — 99233 SBSQ HOSP IP/OBS HIGH 50: CPT | Mod: NTX,,, | Performed by: NURSE PRACTITIONER

## 2018-06-02 PROCEDURE — P9016 RBC LEUKOCYTES REDUCED: HCPCS | Mod: NTX

## 2018-06-02 PROCEDURE — 25000003 PHARM REV CODE 250: Mod: NTX | Performed by: INTERNAL MEDICINE

## 2018-06-02 PROCEDURE — 63600175 PHARM REV CODE 636 W HCPCS: Mod: NTX | Performed by: STUDENT IN AN ORGANIZED HEALTH CARE EDUCATION/TRAINING PROGRAM

## 2018-06-02 PROCEDURE — 86850 RBC ANTIBODY SCREEN: CPT | Mod: NTX

## 2018-06-02 PROCEDURE — 20600001 HC STEP DOWN PRIVATE ROOM: Mod: NTX

## 2018-06-02 PROCEDURE — 86920 COMPATIBILITY TEST SPIN: CPT | Mod: NTX

## 2018-06-02 PROCEDURE — 63600175 PHARM REV CODE 636 W HCPCS: Mod: NTX | Performed by: NURSE PRACTITIONER

## 2018-06-02 PROCEDURE — 63600175 PHARM REV CODE 636 W HCPCS: Mod: NTX | Performed by: PHYSICIAN ASSISTANT

## 2018-06-02 PROCEDURE — 84100 ASSAY OF PHOSPHORUS: CPT | Mod: NTX

## 2018-06-02 PROCEDURE — 85610 PROTHROMBIN TIME: CPT | Mod: NTX

## 2018-06-02 RX ORDER — SODIUM CHLORIDE 9 MG/ML
INJECTION, SOLUTION INTRAVENOUS ONCE
Status: COMPLETED | OUTPATIENT
Start: 2018-06-02 | End: 2018-06-02

## 2018-06-02 RX ORDER — MORPHINE SULFATE 2 MG/ML
1 INJECTION, SOLUTION INTRAMUSCULAR; INTRAVENOUS ONCE
Status: COMPLETED | OUTPATIENT
Start: 2018-06-02 | End: 2018-06-02

## 2018-06-02 RX ORDER — HYDROCODONE BITARTRATE AND ACETAMINOPHEN 500; 5 MG/1; MG/1
TABLET ORAL
Status: DISCONTINUED | OUTPATIENT
Start: 2018-06-02 | End: 2018-06-03

## 2018-06-02 RX ADMIN — RISPERIDONE 1 MG: 1 TABLET ORAL at 08:06

## 2018-06-02 RX ADMIN — GABAPENTIN 100 MG: 100 CAPSULE ORAL at 08:06

## 2018-06-02 RX ADMIN — AMLODIPINE BESYLATE 5 MG: 5 TABLET ORAL at 08:06

## 2018-06-02 RX ADMIN — LEVETIRACETAM 500 MG: 500 TABLET ORAL at 08:06

## 2018-06-02 RX ADMIN — OXYCODONE HYDROCHLORIDE 5 MG: 5 TABLET ORAL at 08:06

## 2018-06-02 RX ADMIN — SEVELAMER CARBONATE 1600 MG: 800 TABLET, FILM COATED ORAL at 08:06

## 2018-06-02 RX ADMIN — MORPHINE SULFATE 1 MG: 2 INJECTION, SOLUTION INTRAMUSCULAR; INTRAVENOUS at 02:06

## 2018-06-02 RX ADMIN — URSODIOL 300 MG: 300 CAPSULE ORAL at 08:06

## 2018-06-02 RX ADMIN — DOCUSATE SODIUM 100 MG: 100 CAPSULE, LIQUID FILLED ORAL at 08:06

## 2018-06-02 RX ADMIN — LEVOTHYROXINE SODIUM 75 MCG: 75 TABLET ORAL at 05:06

## 2018-06-02 RX ADMIN — FLUCONAZOLE 200 MG: 200 TABLET ORAL at 08:06

## 2018-06-02 RX ADMIN — RAMELTEON 8 MG: 8 TABLET, FILM COATED ORAL at 08:06

## 2018-06-02 RX ADMIN — SIROLIMUS 1 MG: 1 TABLET, SUGAR COATED ORAL at 08:06

## 2018-06-02 RX ADMIN — FAMOTIDINE 20 MG: 20 TABLET ORAL at 08:06

## 2018-06-02 RX ADMIN — GENTAMICIN SULFATE 80 MG: 40 INJECTION, SOLUTION INTRAMUSCULAR; INTRAVENOUS at 04:06

## 2018-06-02 RX ADMIN — PREDNISONE 60 MG: 10 TABLET ORAL at 08:06

## 2018-06-02 RX ADMIN — DRONABINOL 2.5 MG: 2.5 CAPSULE ORAL at 08:06

## 2018-06-02 RX ADMIN — SODIUM CHLORIDE 300 ML: 9 INJECTION, SOLUTION INTRAVENOUS at 04:06

## 2018-06-02 RX ADMIN — OXYCODONE HYDROCHLORIDE 5 MG: 5 TABLET ORAL at 03:06

## 2018-06-02 RX ADMIN — POLYETHYLENE GLYCOL 3350 17 G: 17 POWDER, FOR SOLUTION ORAL at 08:06

## 2018-06-02 RX ADMIN — BISACODYL 10 MG: 5 TABLET, COATED ORAL at 08:06

## 2018-06-02 RX ADMIN — SEVELAMER CARBONATE 1600 MG: 800 TABLET, FILM COATED ORAL at 12:06

## 2018-06-02 NOTE — ASSESSMENT & PLAN NOTE
- patient with chronic constipation  - stool softeners ordered  - lactulose, suppository, and kayexalate given without response. Brown bomb enema  Successfully given 5/29 and 5/30  - miralax added to regimen   - Golytely started 6/1  - see above for further details.

## 2018-06-02 NOTE — ASSESSMENT & PLAN NOTE
- admit with abdominal pain  - denies N/V  - xray with significant stool, no obstruction noted  - LFTs stable  - CT A/P with ascites, anasarca, and moderate stool. No other significant/acute findings noted.   - lactulose, suppository, and kayexalate given without response. Brown bomb enema given 5/29 resulting in small-medium BM. Given again 5/30 x2 per patient request with BMs noted   - KUB 5/31 still with significant amounts of stool --> Miralax ordered with minimal result   - Golytely ordered 6/1 as well as brown bomb enema   - Explained again need to withhold narcotics as constipation felt to be source of patient's pain   - repeat KUB

## 2018-06-02 NOTE — PROGRESS NOTES
[facility] HEMODIALYSIS NOTE     Patient currently on hemodialysis for removal of uremic toxins and volume.    Patient seen and evaluated on hemodialysis, tolerating treatment, see HD flowsheet for vitals and assessments.      Ultrafiltration goal is      Labs have been reviewed and the dialysate bath has been adjusted.     Assessment/Plan:     HD today for removal of uremic toxins and volume.  Hbg 6.4 1 u prbcs ordered by transplant  To be given at HD

## 2018-06-02 NOTE — PROGRESS NOTES
Patient arrived on unit via stretcher. Standing weight obtained @ 71 kg. Dialysis initiated via right CVC. Ports aspirated and flushed without difficulty. Lines connected and secured. Orders and machine settings verified. Tx started. Good blood flows established. VSS.

## 2018-06-02 NOTE — PROGRESS NOTES
[facility]  Liver Transplant  Progress Note    Patient Name: Jhonny Diana  MRN: [id number]  Admission Date: 2018  Hospital Length of Stay: 6 days  Code Status: Full Code  Primary Care Provider: Primary Doctor No  Post-Operative Day: 226    ORGAN:   LIVER  Disease Etiology: Acute Alcoholic Hepatitis  Donor Type:    - Brain Death  CDC High Risk:   No  Donor CMV Status:   Donor CMV Status: Positive  Donor HBcAB:   Negative  Donor HCV Status:   Negative  Whole or Partial: Whole Liver  Biliary Anastomosis: End to End  Arterial Anatomy: Standard  Subjective:     History of Present Illness:  Mr. 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  S/p OLTX 10/19/2017 (Liver) with steroid induction. Post op course with multiple complications to include biliary stricture, FUO, ESRD, and chronic abdominal pain. Recently discharged on  after workup for abdominal pain and thrombocytopenia. He presented  with worsening abdominal pain since Friday and distention. Denies nausea, vomiting, sob, cp, fevers, or any other new or concerning symptoms. Reports no BM x3 days.        Hospital Course:  Hospital Course: Patient with persistent abdominal pain and no BM despite lactulose and suppository. Unable to tolerate HD d/t agitation and abdominal discomfort- requested to be taken off. Potassium elevated since HD not completed. Shifted with insulin/dextrose, calcium gluc, bicarb, and diuretics.  Potassium improved following. Patient tearful and overwhelmed. Still with some abnormal mouth and hand twitching, odd comments, and confusion. Able to toelrate HD on  with reassurance and assistance from his mom. Mental status much improved following HD- pt less sluggish, quicker to respond, and responding appropriately. Psych consult per pt request for anxiety and depression. Risperdal started 18.     Interval history: No acute events overnight, patient reports minimal sleep. Mental status  remains with improvement.  Txp team with psych team 5/30, started Risperdal. Tolerating diet well. Requested enema x2 5/30, both resulting in small BM. KUB 5/31 still with significant amounts of stool. Miralax started with minimal result. Golytely ordered with 2 bowel movements.  Pt reports that he is having signif back/abd pain especially during HD.  He is requesting additional pain medication prior to HD and states he will be unable to undergo HD tx if no additional pain medication given.  Narcotics are likely patient's source of severe constipation and pain which was again explained to patient.  Agreed to given 1 x dose IV pain medication prior to HD.  Pt amenable to this.  LFTs remains stable. Continuing to hold cyclosporine, started Rapa at 1 mg as plt severely low to start. Hematology following. Retic and LDH improved from last admission.     Scheduled Meds:   sodium chloride 0.9%   Intravenous Once    sodium chloride 0.9%   Intravenous Once    amLODIPine  5 mg Oral Daily    bisacodyl  10 mg Oral Daily    docusate sodium  100 mg Oral BID    dronabinol  2.5 mg Oral QHS    epoetin maurisio  20,000 Units Subcutaneous Every Mon, Fri    famotidine  20 mg Oral QHS    fluconazole  200 mg Oral Daily    gabapentin  100 mg Oral QHS    glycerin 99.5%  100 mL Rectal Once    And    magnesium citrate  300 mL Rectal Once    And    sodium chloride 0.9%  500 mL Rectal Once    levETIRAcetam  500 mg Oral BID    levothyroxine  75 mcg Oral Before breakfast    morphine  1 mg Intravenous Once    polyethylene glycol  17 g Oral Daily    predniSONE  60 mg Oral Daily    risperiDONE  1 mg Oral QHS    sevelamer carbonate  1,600 mg Oral TID WM    sirolimus  1 mg Oral Daily    sodium chloride 0.9%  500 mL Rectal Once    ursodiol  300 mg Oral BID     Continuous Infusions:  PRN Meds:sodium chloride, acetaminophen, dextrose 50%, diphenhydrAMINE, gentamicin, glucagon (human recombinant), ondansetron, oxyCODONE, ramelteon,  sodium chloride 0.9%    Review of Systems   Constitutional: Positive for fatigue. Negative for activity change, appetite change and chills.   HENT: Negative.    Eyes: Negative.    Respiratory: Negative for chest tightness and shortness of breath.    Cardiovascular: Positive for leg swelling. Negative for palpitations.   Gastrointestinal: Positive for abdominal distention, abdominal pain and constipation. Negative for nausea and vomiting.   Genitourinary: Negative.    Musculoskeletal: Positive for back pain. Negative for arthralgias.   Skin: Negative.    Allergic/Immunologic: Positive for immunocompromised state.   Neurological: Negative.    Psychiatric/Behavioral: Positive for sleep disturbance. Negative for confusion and decreased concentration. The patient is nervous/anxious.      Objective:     Vital Signs (Most Recent):  Temp: 98.5 °F (36.9 °C) (06/02/18 0849)  Pulse: 106 (06/02/18 0849)  Resp: 16 (06/02/18 0849)  BP: 129/76 (06/02/18 0849)  SpO2: 96 % (06/02/18 0849) Vital Signs (24h Range):  Temp:  [98.1 °F (36.7 °C)-98.5 °F (36.9 °C)] 98.5 °F (36.9 °C)  Pulse:  [] 106  Resp:  [15-18] 16  SpO2:  [96 %-99 %] 96 %  BP: (129-147)/(76-88) 129/76     Weight: 65.2 kg (143 lb 11.8 oz)  Body mass index is 23.2 kg/m².    Intake/Output - Last 3 Shifts       05/31 0700 - 06/01 0659 06/01 0700 - 06/02 0659 06/02 0700 - 06/03 0659    P.O. 360 2390 330    I.V. (mL/kg) 0 (0) 0 (0)     Other 0 0     Total Intake(mL/kg) 360 (5.5) 2390 (36.7) 330 (5.1)    Urine (mL/kg/hr) 740 (0.5) 1530 (1) 780 (2.2)    Emesis/NG output 0 (0) 0 (0)     Other 0 (0) 0 (0)     Stool 0 (0) 0 (0)     Blood 0 (0) 0 (0)     Total Output 740 1530 780    Net -380 +860 -450           Urine Occurrence 0 x 1 x     Stool Occurrence 0 x 2 x     Emesis Occurrence 0 x 0 x           Physical Exam   Constitutional: He is oriented to person, place, and time. Vital signs are normal. He appears well-developed.   Chronic ill appearing    HENT:   Head:  Normocephalic and atraumatic.   Right Ear: Hearing, tympanic membrane and ear canal normal.   Left Ear: Hearing, tympanic membrane and ear canal normal.   Nose: Nose normal.   Mouth/Throat: Uvula is midline.   Eyes: Pupils are equal, round, and reactive to light.   Neck: Trachea normal, normal range of motion and full passive range of motion without pain. No JVD present.   Cardiovascular: Normal rate, regular rhythm, normal heart sounds and normal pulses.    No murmur heard.  Pulmonary/Chest: Effort normal and breath sounds normal. No respiratory distress. He has no wheezes. He has no rales. He exhibits no tenderness.   Diminished at bases.    Abdominal: Soft. Normal appearance and bowel sounds are normal. He exhibits distension. He exhibits no ascites. There is tenderness. There is no rigidity, no rebound, no guarding and no CVA tenderness.   Musculoskeletal: Normal range of motion. He exhibits edema (BLE ). He exhibits no tenderness.   Neurological: He is alert and oriented to person, place, and time. He has normal reflexes.   Skin: Skin is warm and dry.   R IJ permacath   Psychiatric: He has a normal mood and affect. His speech is normal and behavior is normal. Judgment and thought content normal. Cognition and memory are normal.   Nursing note and vitals reviewed.      Laboratory:  Immunosuppressants         Stop Route Frequency     sirolimus tablet 1 mg      -- Oral Daily        CBC:     Recent Labs  Lab 06/02/18  0526   WBC 4.70   RBC 2.00*   HGB 6.4*   HCT 20.2*   PLT 15*   *   MCH 32.0*   MCHC 31.7*     CMP:     Recent Labs  Lab 06/02/18  0526      CALCIUM 8.4*   ALBUMIN 2.5*   PROT 5.3*      K 4.9   CO2 25      BUN 71*   CREATININE 3.2*   ALKPHOS 99   ALT 10   AST 15   BILITOT 1.1*     Labs within the past 24 hours have been reviewed.    Diagnostic Results:  pertinent imaging reviewed.     Assessment/Plan:     * Abdominal pain    - admit with abdominal pain  - denies N/V  - xray  with significant stool, no obstruction noted  - LFTs stable  - CT A/P with ascites, anasarca, and moderate stool. No other significant/acute findings noted.   - lactulose, suppository, and kayexalate given without response. Brown bomb enema given 5/29 resulting in small-medium BM. Given again 5/30 x2 per patient request with BMs noted   - KUB 5/31 still with significant amounts of stool --> Miralax ordered with minimal result   - Golytely ordered 6/1 as well as brown bomb enema   - Explained again need to withhold narcotics as constipation felt to be source of patient's pain   - repeat KUB          Severe protein-calorie malnutrition    - continue dronabinol for appetite stimulant  - po supplements  - appetite improving since initiation of steroids  - reglan on hold d/t mental status and jerking movement changes        Anemia of chronic renal failure    - h/h chronically low, stable  - received 1 unit PRBC 5/30/18  - restart Epo on MW (HD days)  - monitor cbc daily  - hematology following, appreciate assistance   - transfuse 1 unit prbc with HD 6/2          Biliary stricture of transplanted liver              S/P liver transplant    - S/p liver transplant with multiple admissions and last ERCP 5/4   - stent in place per imaging  - LFTs stable  - monitor         Constipation    - patient with chronic constipation  - stool softeners ordered  - lactulose, suppository, and kayexalate given without response. Brown bomb enema  Successfully given 5/29 and 5/30  - miralax added to regimen   - Golytely started 6/1  - see above for further details.         Long-term use of immunosuppressant medication    - Pt on  steroids. Continue to monitor for therapeutic levels and toxicities. Adjust doses accordingly.  - Continuing to hold cyclo for mental status changes and hemolytic anemia/thrombocytopenia   - Start Rapamune 1 mg today- monitor pt's CBC closely as may not tolerated  - Check Rapa level on Monday  - Continue pred 60 mg  "until Monday to support pt's blood counts, then start taper of 10 mg weekly if he tolerates- pt to continue 20 mg once tapers to this dose          Prophylactic immunotherapy    - please see long term IS        At risk for opportunistic infections    - pentamidine for pcp prophylaxis (last treatment 5/21)  - valcyte on hold as neutropenic, continue surveillance CMV PCR ( last checked 5/22, undetected). CMV PCR 5/29, pending  - fluconazole for antigungal          Anxiety about health    - Pt admitted with cognitive slowing, confusion, being "off", twitchy mouth movements, and slight hand tremors  - CT negative for acute processes  - Bcx NGTD, UA clean, UCx pending.  - Cont hold cyclo for now  - jerky movements could be related to reglan (?tardive dyskinesias), will hold  - mental status much improved today. Less sluggish with appropriate responses. Still remains tearful and emotional regarding future/current health situation   - dependent on mom and becomes emotional when talking about all she has done to help him   - pt requested psych consult for establishment of relationship for outpatient psychotherapy. Consult placed. appreciate assistance.            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  Per hematology this may be reactive to underlying organ dysfunction, drug and or peripheral destruction (specifically referring to platelets  - HIT panel negative previously.   - Per previous Heme recommendations on last admit, cont with steroids  - restarted cyclosporine 5/21  - hematology re consulted this admission, appreciate recs and assistance.    - continue steroids   - transfuse for PLTS <10  - received 1 pack PLTs 5/28 d/t PLT count of 9. Responded well.           ESRD Monday/Friday    - nephrology following  - outpatient HD MF schedule  - patient to HD 5/28, but adamant about being " taken off after 45 minutes.     -with hyperkalemia d/t incomplete HD overnight. Given insulin/dextrose, calcium gluconate, diuretics, and bicarb with improvement.  - Pt to HD again 5/29 for electrolyte and toxin clearance and volume removal.   - restart epo for HD days  - patient with more anxiety/pain at HD  - HD today             VTE Risk Mitigation         Ordered     Place ISMA hose  Until discontinued      05/27/18 2019     Place sequential compression device  Until discontinued      05/27/18 2019     Reason for No Pharmacological VTE Prophylaxis  Once      05/27/18 2019     IP VTE HIGH RISK PATIENT  Once      05/27/18 2019          The patients clinical status was discussed at multidisplinary rounds, involving transplant surgery, transplant medicine, pharmacy, nursing, nutrition, and social work    Discharge Planning:  Monitor kidney function --> may need outpt HD  Monitor HH needs vs rehab for deconditioned status      Seda Camacho, NP  Liver Transplant  [facility]

## 2018-06-02 NOTE — ASSESSMENT & PLAN NOTE
- h/h chronically low, stable  - received 1 unit PRBC 5/30/18  - restart Epo on MW (HD days)  - monitor cbc daily  - hematology following, appreciate assistance   - transfuse 1 unit prbc with HD 6/2

## 2018-06-02 NOTE — PROGRESS NOTES
Pt currently down for HD, but has been in bed and OOB in chair today prior to going down for HD at 1400. AAO x4 throughout shift. Mother at bedside, attentive to patient. They have been on 2 trips downstairs to get out of the room so far today- pt's mother takes him down in personal wheelchair at mealtimes. He ambulates to transfer with standby assistance wearing nonskid footwear. Ambulates with standby assistance wearing nonskid footwear. Educated pt and mother on fall prevention measures; both verbalized understanding. Pt reports constant aching abdominal pain, worse on left side, in addition to back pain. PRN oxycodone IR 5mg administered 1x as ordered for pain so far this shift. Pain management plan discussed by transplant surgeon and NP with pt and pt's mother this morning during rounds; Providers explained/ reinforced that bowel dysfunction causing patient's abdominal pain is an adverse effect of long-term narcotic use, so goal is to minimize narcotic use as much as possible in managing his pain. Per discussion, 1x dose of morphine 1 mg IV ordered by team for when pt is down for dialysis today. Pt reports to RN that he will try to wait on PRN dose of oxy IR that could have been administered at 1300 so that it is available when he returns to unit after HD, as 1x dose of morphine may be adequate for the interim. Pt provided with encouragement and support. Will continue to monitor, assess and treat pain as indicated. XR abdomen AP obtained this afternoon as ordered, see results for details.  VSS, see flowsheet for details. Plan of care reviewed with pt & mother, who report no questions at this time.

## 2018-06-02 NOTE — PLAN OF CARE
"Problem: Patient Care Overview  Goal: Plan of Care Review  Outcome: Ongoing (interventions implemented as appropriate)  Pt AAOx4, vital signs stable.  Pt up walking halls tonight.  Pt complains of severe pain tonight.  During the day pt drank entire jug of golytely and reported having only 2 BM throughout the day.  No BM reported thus far.  PRN oxycodone 5mg given at 2126.  When giving the 5mg of oxycodone pt asked if he could have something else as this would not touch the pain.  Called MD, 1x dose of 2mg PO dilaudid ordered and given at 2146.  When giving the PO dilaudid pt stated, "This is not going to help either, I will need something IV. "  RN instructed pt to give PO medication time to work.  One hour later, Pt still complaining of pain,MD called and 1x dose of IV 0.5mg Dilaudid ordered and given. PRN ramelton given tonight.   Mother at bedside.  Bed lowered, locked, siderails up x2, and call bell in reach.  See flow sheet for assessment findings.  Will continue to monitor pt.        "

## 2018-06-02 NOTE — ASSESSMENT & PLAN NOTE
- nephrology following  - outpatient HD MF schedule  - patient to HD 5/28, but adamant about being taken off after 45 minutes.     -with hyperkalemia d/t incomplete HD overnight. Given insulin/dextrose, calcium gluconate, diuretics, and bicarb with improvement.  - Pt to HD again 5/29 for electrolyte and toxin clearance and volume removal.   - restart epo for HD days  - patient with more anxiety/pain at HD  - HD today

## 2018-06-02 NOTE — PROGRESS NOTES
Dialysis tx completed. 3 hours. 2 liters removed.  Unit RBC given. Right CVC locked with gent., capped and secured. Tolerated tx well. Report given to Elvia SHEEHAN.

## 2018-06-02 NOTE — SUBJECTIVE & OBJECTIVE
Scheduled Meds:   sodium chloride 0.9%   Intravenous Once    sodium chloride 0.9%   Intravenous Once    amLODIPine  5 mg Oral Daily    bisacodyl  10 mg Oral Daily    docusate sodium  100 mg Oral BID    dronabinol  2.5 mg Oral QHS    epoetin maurisio  20,000 Units Subcutaneous Every Mon, Fri    famotidine  20 mg Oral QHS    fluconazole  200 mg Oral Daily    gabapentin  100 mg Oral QHS    glycerin 99.5%  100 mL Rectal Once    And    magnesium citrate  300 mL Rectal Once    And    sodium chloride 0.9%  500 mL Rectal Once    levETIRAcetam  500 mg Oral BID    levothyroxine  75 mcg Oral Before breakfast    morphine  1 mg Intravenous Once    polyethylene glycol  17 g Oral Daily    predniSONE  60 mg Oral Daily    risperiDONE  1 mg Oral QHS    sevelamer carbonate  1,600 mg Oral TID WM    sirolimus  1 mg Oral Daily    sodium chloride 0.9%  500 mL Rectal Once    ursodiol  300 mg Oral BID     Continuous Infusions:  PRN Meds:sodium chloride, acetaminophen, dextrose 50%, diphenhydrAMINE, gentamicin, glucagon (human recombinant), ondansetron, oxyCODONE, ramelteon, sodium chloride 0.9%    Review of Systems   Constitutional: Positive for fatigue. Negative for activity change, appetite change and chills.   HENT: Negative.    Eyes: Negative.    Respiratory: Negative for chest tightness and shortness of breath.    Cardiovascular: Positive for leg swelling. Negative for palpitations.   Gastrointestinal: Positive for abdominal distention, abdominal pain and constipation. Negative for nausea and vomiting.   Genitourinary: Negative.    Musculoskeletal: Positive for back pain. Negative for arthralgias.   Skin: Negative.    Allergic/Immunologic: Positive for immunocompromised state.   Neurological: Negative.    Psychiatric/Behavioral: Positive for sleep disturbance. Negative for confusion and decreased concentration. The patient is nervous/anxious.      Objective:     Vital Signs (Most Recent):  Temp: 98.5 °F (36.9 °C)  (06/02/18 0849)  Pulse: 106 (06/02/18 0849)  Resp: 16 (06/02/18 0849)  BP: 129/76 (06/02/18 0849)  SpO2: 96 % (06/02/18 0849) Vital Signs (24h Range):  Temp:  [98.1 °F (36.7 °C)-98.5 °F (36.9 °C)] 98.5 °F (36.9 °C)  Pulse:  [] 106  Resp:  [15-18] 16  SpO2:  [96 %-99 %] 96 %  BP: (129-147)/(76-88) 129/76     Weight: 65.2 kg (143 lb 11.8 oz)  Body mass index is 23.2 kg/m².    Intake/Output - Last 3 Shifts       05/31 0700 - 06/01 0659 06/01 0700 - 06/02 0659 06/02 0700 - 06/03 0659    P.O. 360 2390 330    I.V. (mL/kg) 0 (0) 0 (0)     Other 0 0     Total Intake(mL/kg) 360 (5.5) 2390 (36.7) 330 (5.1)    Urine (mL/kg/hr) 740 (0.5) 1530 (1) 780 (2.2)    Emesis/NG output 0 (0) 0 (0)     Other 0 (0) 0 (0)     Stool 0 (0) 0 (0)     Blood 0 (0) 0 (0)     Total Output 740 1530 780    Net -380 +860 -450           Urine Occurrence 0 x 1 x     Stool Occurrence 0 x 2 x     Emesis Occurrence 0 x 0 x           Physical Exam   Constitutional: He is oriented to person, place, and time. Vital signs are normal. He appears well-developed.   Chronic ill appearing    HENT:   Head: Normocephalic and atraumatic.   Right Ear: Hearing, tympanic membrane and ear canal normal.   Left Ear: Hearing, tympanic membrane and ear canal normal.   Nose: Nose normal.   Mouth/Throat: Uvula is midline.   Eyes: Pupils are equal, round, and reactive to light.   Neck: Trachea normal, normal range of motion and full passive range of motion without pain. No JVD present.   Cardiovascular: Normal rate, regular rhythm, normal heart sounds and normal pulses.    No murmur heard.  Pulmonary/Chest: Effort normal and breath sounds normal. No respiratory distress. He has no wheezes. He has no rales. He exhibits no tenderness.   Diminished at bases.    Abdominal: Soft. Normal appearance and bowel sounds are normal. He exhibits distension. He exhibits no ascites. There is tenderness. There is no rigidity, no rebound, no guarding and no CVA tenderness.    Musculoskeletal: Normal range of motion. He exhibits edema (BLE ). He exhibits no tenderness.   Neurological: He is alert and oriented to person, place, and time. He has normal reflexes.   Skin: Skin is warm and dry.   R IJ permacath   Psychiatric: He has a normal mood and affect. His speech is normal and behavior is normal. Judgment and thought content normal. Cognition and memory are normal.   Nursing note and vitals reviewed.      Laboratory:  Immunosuppressants         Stop Route Frequency     sirolimus tablet 1 mg      -- Oral Daily        CBC:     Recent Labs  Lab 06/02/18  0526   WBC 4.70   RBC 2.00*   HGB 6.4*   HCT 20.2*   PLT 15*   *   MCH 32.0*   MCHC 31.7*     CMP:     Recent Labs  Lab 06/02/18  0526      CALCIUM 8.4*   ALBUMIN 2.5*   PROT 5.3*      K 4.9   CO2 25      BUN 71*   CREATININE 3.2*   ALKPHOS 99   ALT 10   AST 15   BILITOT 1.1*     Labs within the past 24 hours have been reviewed.    Diagnostic Results:  pertinent imaging reviewed.

## 2018-06-03 PROBLEM — F05 ACUTE CONFUSIONAL STATE: Status: RESOLVED | Noted: 2018-06-01 | Resolved: 2018-06-03

## 2018-06-03 LAB
ALBUMIN SERPL BCP-MCNC: 2.6 G/DL
ALP SERPL-CCNC: 96 U/L
ALT SERPL W/O P-5'-P-CCNC: 13 U/L
ANION GAP SERPL CALC-SCNC: 7 MMOL/L
ANISOCYTOSIS BLD QL SMEAR: SLIGHT
AST SERPL-CCNC: 14 U/L
BASOPHILS # BLD AUTO: 0 K/UL
BASOPHILS NFR BLD: 0 %
BILIRUB SERPL-MCNC: 1.3 MG/DL
BLD PROD TYP BPU: NORMAL
BLOOD UNIT EXPIRATION DATE: NORMAL
BLOOD UNIT TYPE CODE: 5100
BLOOD UNIT TYPE: NORMAL
BUN SERPL-MCNC: 46 MG/DL
CALCIUM SERPL-MCNC: 8.5 MG/DL
CHLORIDE SERPL-SCNC: 109 MMOL/L
CO2 SERPL-SCNC: 21 MMOL/L
CODING SYSTEM: NORMAL
CREAT SERPL-MCNC: 2.4 MG/DL
DACRYOCYTES BLD QL SMEAR: ABNORMAL
DIFFERENTIAL METHOD: ABNORMAL
DISPENSE STATUS: NORMAL
EOSINOPHIL # BLD AUTO: 0 K/UL
EOSINOPHIL NFR BLD: 0 %
ERYTHROCYTE [DISTWIDTH] IN BLOOD BY AUTOMATED COUNT: 16.9 %
EST. GFR  (AFRICAN AMERICAN): 40.9 ML/MIN/1.73 M^2
EST. GFR  (NON AFRICAN AMERICAN): 35.4 ML/MIN/1.73 M^2
GLUCOSE SERPL-MCNC: 100 MG/DL
HCT VFR BLD AUTO: 23.5 %
HGB BLD-MCNC: 7.9 G/DL
IMM GRANULOCYTES # BLD AUTO: 0.12 K/UL
IMM GRANULOCYTES NFR BLD AUTO: 2 %
INR PPP: 1
LYMPHOCYTES # BLD AUTO: 0.7 K/UL
LYMPHOCYTES NFR BLD: 11.8 %
MAGNESIUM SERPL-MCNC: 2.1 MG/DL
MCH RBC QN AUTO: 32 PG
MCHC RBC AUTO-ENTMCNC: 33.6 G/DL
MCV RBC AUTO: 95 FL
MONOCYTES # BLD AUTO: 0.5 K/UL
MONOCYTES NFR BLD: 8.1 %
NEUTROPHILS # BLD AUTO: 4.6 K/UL
NEUTROPHILS NFR BLD: 78.1 %
NRBC BLD-RTO: 0 /100 WBC
OVALOCYTES BLD QL SMEAR: ABNORMAL
PHOSPHATE SERPL-MCNC: 3.7 MG/DL
PLATELET # BLD AUTO: 18 K/UL
PLATELET BLD QL SMEAR: ABNORMAL
PMV BLD AUTO: 11.8 FL
POIKILOCYTOSIS BLD QL SMEAR: SLIGHT
POTASSIUM SERPL-SCNC: 5.1 MMOL/L
PROT SERPL-MCNC: 5.6 G/DL
PROTHROMBIN TIME: 10.8 SEC
RBC # BLD AUTO: 2.47 M/UL
SODIUM SERPL-SCNC: 137 MMOL/L
TRANS PLATPHERESIS VOL PATIENT: NORMAL ML
WBC # BLD AUTO: 5.94 K/UL

## 2018-06-03 PROCEDURE — 85610 PROTHROMBIN TIME: CPT | Mod: NTX

## 2018-06-03 PROCEDURE — 25000003 PHARM REV CODE 250: Mod: NTX | Performed by: PHYSICIAN ASSISTANT

## 2018-06-03 PROCEDURE — 25000003 PHARM REV CODE 250: Mod: NTX | Performed by: NURSE PRACTITIONER

## 2018-06-03 PROCEDURE — 63600175 PHARM REV CODE 636 W HCPCS: Mod: NTX | Performed by: STUDENT IN AN ORGANIZED HEALTH CARE EDUCATION/TRAINING PROGRAM

## 2018-06-03 PROCEDURE — 80053 COMPREHEN METABOLIC PANEL: CPT | Mod: NTX

## 2018-06-03 PROCEDURE — 25000003 PHARM REV CODE 250: Mod: NTX | Performed by: STUDENT IN AN ORGANIZED HEALTH CARE EDUCATION/TRAINING PROGRAM

## 2018-06-03 PROCEDURE — 83735 ASSAY OF MAGNESIUM: CPT | Mod: NTX

## 2018-06-03 PROCEDURE — P9035 PLATELET PHERES LEUKOREDUCED: HCPCS | Mod: NTX

## 2018-06-03 PROCEDURE — 20600001 HC STEP DOWN PRIVATE ROOM: Mod: NTX

## 2018-06-03 PROCEDURE — 36430 TRANSFUSION BLD/BLD COMPNT: CPT | Mod: NTX

## 2018-06-03 PROCEDURE — 84100 ASSAY OF PHOSPHORUS: CPT | Mod: NTX

## 2018-06-03 PROCEDURE — 63600175 PHARM REV CODE 636 W HCPCS: Mod: NTX | Performed by: PHYSICIAN ASSISTANT

## 2018-06-03 PROCEDURE — 36415 COLL VENOUS BLD VENIPUNCTURE: CPT | Mod: NTX

## 2018-06-03 PROCEDURE — 99233 SBSQ HOSP IP/OBS HIGH 50: CPT | Mod: NTX,,, | Performed by: NURSE PRACTITIONER

## 2018-06-03 PROCEDURE — 85025 COMPLETE CBC W/AUTO DIFF WBC: CPT | Mod: NTX

## 2018-06-03 RX ORDER — HYDROCODONE BITARTRATE AND ACETAMINOPHEN 500; 5 MG/1; MG/1
TABLET ORAL
Status: DISCONTINUED | OUTPATIENT
Start: 2018-06-03 | End: 2018-06-04 | Stop reason: HOSPADM

## 2018-06-03 RX ORDER — SIMETHICONE 80 MG
1 TABLET,CHEWABLE ORAL
Status: DISCONTINUED | OUTPATIENT
Start: 2018-06-03 | End: 2018-06-04 | Stop reason: HOSPADM

## 2018-06-03 RX ORDER — GABAPENTIN 300 MG/1
300 CAPSULE ORAL NIGHTLY
Status: DISCONTINUED | OUTPATIENT
Start: 2018-06-03 | End: 2018-06-04 | Stop reason: HOSPADM

## 2018-06-03 RX ADMIN — LEVOTHYROXINE SODIUM 75 MCG: 75 TABLET ORAL at 05:06

## 2018-06-03 RX ADMIN — POLYETHYLENE GLYCOL 3350 17 G: 17 POWDER, FOR SOLUTION ORAL at 08:06

## 2018-06-03 RX ADMIN — DOCUSATE SODIUM 100 MG: 100 CAPSULE, LIQUID FILLED ORAL at 08:06

## 2018-06-03 RX ADMIN — SEVELAMER CARBONATE 1600 MG: 800 TABLET, FILM COATED ORAL at 12:06

## 2018-06-03 RX ADMIN — SIMETHICONE CHEW TAB 80 MG 80 MG: 80 TABLET ORAL at 09:06

## 2018-06-03 RX ADMIN — OXYCODONE HYDROCHLORIDE 5 MG: 5 TABLET ORAL at 10:06

## 2018-06-03 RX ADMIN — FAMOTIDINE 20 MG: 20 TABLET ORAL at 09:06

## 2018-06-03 RX ADMIN — RAMELTEON 8 MG: 8 TABLET, FILM COATED ORAL at 09:06

## 2018-06-03 RX ADMIN — LEVETIRACETAM 500 MG: 500 TABLET ORAL at 09:06

## 2018-06-03 RX ADMIN — LEVETIRACETAM 500 MG: 500 TABLET ORAL at 08:06

## 2018-06-03 RX ADMIN — SEVELAMER CARBONATE 1600 MG: 800 TABLET, FILM COATED ORAL at 07:06

## 2018-06-03 RX ADMIN — OXYCODONE HYDROCHLORIDE 5 MG: 5 TABLET ORAL at 05:06

## 2018-06-03 RX ADMIN — RISPERIDONE 1 MG: 1 TABLET ORAL at 09:06

## 2018-06-03 RX ADMIN — DRONABINOL 2.5 MG: 2.5 CAPSULE ORAL at 09:06

## 2018-06-03 RX ADMIN — AMLODIPINE BESYLATE 5 MG: 5 TABLET ORAL at 08:06

## 2018-06-03 RX ADMIN — DOCUSATE SODIUM 100 MG: 100 CAPSULE, LIQUID FILLED ORAL at 09:06

## 2018-06-03 RX ADMIN — SEVELAMER CARBONATE 1600 MG: 800 TABLET, FILM COATED ORAL at 05:06

## 2018-06-03 RX ADMIN — URSODIOL 300 MG: 300 CAPSULE ORAL at 09:06

## 2018-06-03 RX ADMIN — SIMETHICONE CHEW TAB 80 MG 80 MG: 80 TABLET ORAL at 01:06

## 2018-06-03 RX ADMIN — URSODIOL 300 MG: 300 CAPSULE ORAL at 08:06

## 2018-06-03 RX ADMIN — BISACODYL 10 MG: 5 TABLET, COATED ORAL at 08:06

## 2018-06-03 RX ADMIN — PREDNISONE 60 MG: 10 TABLET ORAL at 08:06

## 2018-06-03 RX ADMIN — SIROLIMUS 1 MG: 1 TABLET, SUGAR COATED ORAL at 08:06

## 2018-06-03 RX ADMIN — FLUCONAZOLE 200 MG: 200 TABLET ORAL at 08:06

## 2018-06-03 RX ADMIN — GABAPENTIN 300 MG: 300 CAPSULE ORAL at 09:06

## 2018-06-03 NOTE — PLAN OF CARE
Problem: Patient Care Overview  Goal: Plan of Care Review  Outcome: Ongoing (interventions implemented as appropriate)  Pt AAOx4, vital signs stable.  No BM reported today.  Pt up walking halls tonight.  PRN oxycodone 5mg given at 2019. Three hours after giving PO oxycodone 5 mg, pt asked if he could get something else for pain PO.  MD notified, no new orders.  After pt was unable to get more pain medicine, pt left floor for 2 hours ambulating the halls of the hospital.  Mother at bedside.  See flow sheet for assessment findings.  Will continue to monitor pt.

## 2018-06-03 NOTE — PLAN OF CARE
RN called into pt room.  Pt states he understands he cannot have any IV pain medicine but would like something more than his current PRN PO oxycodone 5 MG.  RN explained the reasoning as to why the doctors are wanting to limit narcotic use.  RN talked with MD Sifuentes while she was currently on unit and explained pt's history and his want for more pain medication.  MD visited pt and explained why the team is wanting to limit narcotics and decided not to order anything else for pain.

## 2018-06-03 NOTE — SUBJECTIVE & OBJECTIVE
Scheduled Meds:   sodium chloride 0.9%   Intravenous Once    amLODIPine  5 mg Oral Daily    bisacodyl  10 mg Oral Daily    docusate sodium  100 mg Oral BID    dronabinol  2.5 mg Oral QHS    epoetin maurisio  20,000 Units Subcutaneous Every Mon, Fri    famotidine  20 mg Oral QHS    fluconazole  200 mg Oral Daily    gabapentin  300 mg Oral QHS    glycerin 99.5%  100 mL Rectal Once    And    magnesium citrate  300 mL Rectal Once    And    sodium chloride 0.9%  500 mL Rectal Once    levETIRAcetam  500 mg Oral BID    levothyroxine  75 mcg Oral Before breakfast    polyethylene glycol  17 g Oral Daily    predniSONE  60 mg Oral Daily    risperiDONE  1 mg Oral QHS    sevelamer carbonate  1,600 mg Oral TID WM    simethicone  1 tablet Oral TID PC    sirolimus  1 mg Oral Daily    sodium chloride 0.9%  500 mL Rectal Once    ursodiol  300 mg Oral BID     Continuous Infusions:  PRN Meds:sodium chloride, acetaminophen, dextrose 50%, diphenhydrAMINE, gentamicin, glucagon (human recombinant), ondansetron, oxyCODONE, ramelteon, sodium chloride 0.9%    Review of Systems   Constitutional: Positive for fatigue. Negative for activity change, appetite change and chills.   HENT: Negative.    Eyes: Negative.    Respiratory: Negative for chest tightness and shortness of breath.    Cardiovascular: Positive for leg swelling. Negative for palpitations.   Gastrointestinal: Positive for abdominal distention, abdominal pain and constipation. Negative for nausea and vomiting.   Genitourinary: Negative.    Musculoskeletal: Positive for back pain. Negative for arthralgias.   Skin: Negative.    Allergic/Immunologic: Positive for immunocompromised state.   Neurological: Negative.    Psychiatric/Behavioral: Positive for sleep disturbance. Negative for confusion and decreased concentration. The patient is nervous/anxious.      Objective:     Vital Signs (Most Recent):  Temp: 98.3 °F (36.8 °C) (06/03/18 1027)  Pulse: 84 (06/03/18  1027)  Resp: 18 (06/03/18 1027)  BP: 130/78 (06/03/18 1027)  SpO2: 97 % (06/03/18 1027) Vital Signs (24h Range):  Temp:  [97.9 °F (36.6 °C)-98.6 °F (37 °C)] 98.3 °F (36.8 °C)  Pulse:  [76-91] 84  Resp:  [15-18] 18  SpO2:  [96 %-97 %] 97 %  BP: (130-143)/(78-91) 130/78     Weight: 65.2 kg (143 lb 11.8 oz)  Body mass index is 23.2 kg/m².    Intake/Output - Last 3 Shifts       06/01 0700 - 06/02 0659 06/02 0700 - 06/03 0659 06/03 0700 - 06/04 0659    P.O. 2390 730     I.V. (mL/kg) 0 (0)      Blood  352     Other 0 600     Total Intake(mL/kg) 2390 (36.7) 1682 (25.8)     Urine (mL/kg/hr) 1530 (1) 1210 (0.8)     Emesis/NG output 0 (0)      Other 0 (0) 2600 (1.7)     Stool 0 (0)      Blood 0 (0)      Total Output 1530 3810      Net +860 -2128             Urine Occurrence 1 x      Stool Occurrence 2 x      Emesis Occurrence 0 x            Physical Exam   Constitutional: He is oriented to person, place, and time. Vital signs are normal. He appears well-developed.   Chronic ill appearing    HENT:   Head: Normocephalic and atraumatic.   Right Ear: Hearing, tympanic membrane and ear canal normal.   Left Ear: Hearing, tympanic membrane and ear canal normal.   Nose: Nose normal.   Mouth/Throat: Uvula is midline.   Eyes: Pupils are equal, round, and reactive to light.   Neck: Trachea normal, normal range of motion and full passive range of motion without pain. No JVD present.   Cardiovascular: Normal rate, regular rhythm, normal heart sounds and normal pulses.    No murmur heard.  Pulmonary/Chest: Effort normal and breath sounds normal. No respiratory distress. He has no wheezes. He has no rales. He exhibits no tenderness.   Diminished at bases.    Abdominal: Soft. Normal appearance and bowel sounds are normal. He exhibits distension. He exhibits no ascites. There is tenderness. There is no rigidity, no rebound, no guarding and no CVA tenderness.   Musculoskeletal: Normal range of motion. He exhibits edema (BLE ). He exhibits no  tenderness.   Neurological: He is alert and oriented to person, place, and time. He has normal reflexes.   Skin: Skin is warm and dry.   R IJ permacath   Psychiatric: He has a normal mood and affect. His speech is normal and behavior is normal. Judgment and thought content normal. Cognition and memory are normal.   Nursing note and vitals reviewed.      Laboratory:  Immunosuppressants         Stop Route Frequency     sirolimus tablet 1 mg      -- Oral Daily        CBC:     Recent Labs  Lab 06/03/18  0422   WBC 5.94   RBC 2.47*   HGB 7.9*   HCT 23.5*   PLT 18*   MCV 95   MCH 32.0*   MCHC 33.6     CMP:     Recent Labs  Lab 06/03/18  0422      CALCIUM 8.5*   ALBUMIN 2.6*   PROT 5.6*      K 5.1   CO2 21*      BUN 46*   CREATININE 2.4*   ALKPHOS 96   ALT 13   AST 14   BILITOT 1.3*     Labs within the past 24 hours have been reviewed.    Diagnostic Results:  pertinent imaging reviewed.

## 2018-06-03 NOTE — ASSESSMENT & PLAN NOTE
- nephrology following  - outpatient HD MF schedule  - patient to HD 5/28, but adamant about being taken off after 45 minutes.     -with hyperkalemia d/t incomplete HD overnight. Given insulin/dextrose, calcium gluconate, diuretics, and bicarb with improvement.  - Pt to HD again 5/29 for electrolyte and toxin clearance and volume removal.   - restart epo for HD days  - patient with more anxiety/pain at HD  - HD 6/2

## 2018-06-03 NOTE — ASSESSMENT & PLAN NOTE
- admit with abdominal pain  - denies N/V  - xray with significant stool, no obstruction noted  - LFTs stable  - CT A/P with ascites, anasarca, and moderate stool. No other significant/acute findings noted.   - lactulose, suppository, and kayexalate given without response. Brown bomb enema given 5/29 resulting in small-medium BM. Given again 5/30 x2 per patient request with BMs noted   - KUB 5/31 still with significant amounts of stool --> Miralax ordered with minimal result   - Golytely ordered 6/1 as well as brown bomb enema   - Explained again need to withhold narcotics as constipation felt to be source of patient's pain   - repeat KUB unchanged  - paracentesis to assess for infectious process

## 2018-06-03 NOTE — PROGRESS NOTES
[facility]  Liver Transplant  Progress Note    Patient Name: Jhonny Diana  MRN: [id number]  Admission Date: 2018  Hospital Length of Stay: 7 days  Code Status: Full Code  Primary Care Provider: Primary Doctor No  Post-Operative Day: 227    ORGAN:   LIVER  Disease Etiology: Acute Alcoholic Hepatitis  Donor Type:    - Brain Death  CDC High Risk:   No  Donor CMV Status:   Donor CMV Status: Positive  Donor HBcAB:   Negative  Donor HCV Status:   Negative  Whole or Partial: Whole Liver  Biliary Anastomosis: End to End  Arterial Anatomy: Standard  Subjective:     History of Present Illness:  Mr. 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  S/p OLTX 10/19/2017 (Liver) with steroid induction. Post op course with multiple complications to include biliary stricture, FUO, ESRD, and chronic abdominal pain. Recently discharged on  after workup for abdominal pain and thrombocytopenia. He presented  with worsening abdominal pain since Friday and distention. Denies nausea, vomiting, sob, cp, fevers, or any other new or concerning symptoms. Reports no BM x3 days.        Hospital Course:  Hospital Course: Patient with persistent abdominal pain and no BM despite lactulose and suppository. Unable to tolerate HD d/t agitation and abdominal discomfort- requested to be taken off. Potassium elevated since HD not completed. Shifted with insulin/dextrose, calcium gluc, bicarb, and diuretics.  Potassium improved following. Patient tearful and overwhelmed. Still with some abnormal mouth and hand twitching, odd comments, and confusion. Able to toelrate HD on  with reassurance and assistance from his mom. Mental status much improved following HD- pt less sluggish, quicker to respond, and responding appropriately. Psych consult per pt request for anxiety and depression. Risperdal started 18.     Interval history: No acute events overnight, patient reports minimal sleep. Mental status  remains with improvement.  Txp team with psych team 5/30, started Risperdal. Tolerating diet well. Requested enema x2 5/30, both resulting in small BM. KUB 5/31 still with significant amounts of stool. Miralax started with minimal result. Golytely ordered with 2 bowel movements.  Patient reports uncontrollable pain again over night.  Narcotics are likely patient's source of severe constipation and pain which was again explained to patient.  Will increase neurontin and plan for paracentesis in am to look for infectious cause of pain.  Transfuse platelets tonight in preparation for paracentesis in am.  Pt amenable to this.  LFTs remains stable. Continuing to hold cyclosporine, started Rapa at 1 mg as plt severely low to start. Hematology following.     Scheduled Meds:   sodium chloride 0.9%   Intravenous Once    amLODIPine  5 mg Oral Daily    bisacodyl  10 mg Oral Daily    docusate sodium  100 mg Oral BID    dronabinol  2.5 mg Oral QHS    epoetin maurisio  20,000 Units Subcutaneous Every Mon, Fri    famotidine  20 mg Oral QHS    fluconazole  200 mg Oral Daily    gabapentin  300 mg Oral QHS    glycerin 99.5%  100 mL Rectal Once    And    magnesium citrate  300 mL Rectal Once    And    sodium chloride 0.9%  500 mL Rectal Once    levETIRAcetam  500 mg Oral BID    levothyroxine  75 mcg Oral Before breakfast    polyethylene glycol  17 g Oral Daily    predniSONE  60 mg Oral Daily    risperiDONE  1 mg Oral QHS    sevelamer carbonate  1,600 mg Oral TID WM    simethicone  1 tablet Oral TID PC    sirolimus  1 mg Oral Daily    sodium chloride 0.9%  500 mL Rectal Once    ursodiol  300 mg Oral BID     Continuous Infusions:  PRN Meds:sodium chloride, acetaminophen, dextrose 50%, diphenhydrAMINE, gentamicin, glucagon (human recombinant), ondansetron, oxyCODONE, ramelteon, sodium chloride 0.9%    Review of Systems   Constitutional: Positive for fatigue. Negative for activity change, appetite change and chills.    HENT: Negative.    Eyes: Negative.    Respiratory: Negative for chest tightness and shortness of breath.    Cardiovascular: Positive for leg swelling. Negative for palpitations.   Gastrointestinal: Positive for abdominal distention, abdominal pain and constipation. Negative for nausea and vomiting.   Genitourinary: Negative.    Musculoskeletal: Positive for back pain. Negative for arthralgias.   Skin: Negative.    Allergic/Immunologic: Positive for immunocompromised state.   Neurological: Negative.    Psychiatric/Behavioral: Positive for sleep disturbance. Negative for confusion and decreased concentration. The patient is nervous/anxious.      Objective:     Vital Signs (Most Recent):  Temp: 98.3 °F (36.8 °C) (06/03/18 1027)  Pulse: 84 (06/03/18 1027)  Resp: 18 (06/03/18 1027)  BP: 130/78 (06/03/18 1027)  SpO2: 97 % (06/03/18 1027) Vital Signs (24h Range):  Temp:  [97.9 °F (36.6 °C)-98.6 °F (37 °C)] 98.3 °F (36.8 °C)  Pulse:  [76-91] 84  Resp:  [15-18] 18  SpO2:  [96 %-97 %] 97 %  BP: (130-143)/(78-91) 130/78     Weight: 65.2 kg (143 lb 11.8 oz)  Body mass index is 23.2 kg/m².    Intake/Output - Last 3 Shifts       06/01 0700 - 06/02 0659 06/02 0700 - 06/03 0659 06/03 0700 - 06/04 0659    P.O. 2390 730     I.V. (mL/kg) 0 (0)      Blood  352     Other 0 600     Total Intake(mL/kg) 2390 (36.7) 1682 (25.8)     Urine (mL/kg/hr) 1530 (1) 1210 (0.8)     Emesis/NG output 0 (0)      Other 0 (0) 2600 (1.7)     Stool 0 (0)      Blood 0 (0)      Total Output 1530 3810      Net +860 -2128             Urine Occurrence 1 x      Stool Occurrence 2 x      Emesis Occurrence 0 x            Physical Exam   Constitutional: He is oriented to person, place, and time. Vital signs are normal. He appears well-developed.   Chronic ill appearing    HENT:   Head: Normocephalic and atraumatic.   Right Ear: Hearing, tympanic membrane and ear canal normal.   Left Ear: Hearing, tympanic membrane and ear canal normal.   Nose: Nose normal.    Mouth/Throat: Uvula is midline.   Eyes: Pupils are equal, round, and reactive to light.   Neck: Trachea normal, normal range of motion and full passive range of motion without pain. No JVD present.   Cardiovascular: Normal rate, regular rhythm, normal heart sounds and normal pulses.    No murmur heard.  Pulmonary/Chest: Effort normal and breath sounds normal. No respiratory distress. He has no wheezes. He has no rales. He exhibits no tenderness.   Diminished at bases.    Abdominal: Soft. Normal appearance and bowel sounds are normal. He exhibits distension. He exhibits no ascites. There is tenderness. There is no rigidity, no rebound, no guarding and no CVA tenderness.   Musculoskeletal: Normal range of motion. He exhibits edema (BLE ). He exhibits no tenderness.   Neurological: He is alert and oriented to person, place, and time. He has normal reflexes.   Skin: Skin is warm and dry.   R IJ permacath   Psychiatric: He has a normal mood and affect. His speech is normal and behavior is normal. Judgment and thought content normal. Cognition and memory are normal.   Nursing note and vitals reviewed.      Laboratory:  Immunosuppressants         Stop Route Frequency     sirolimus tablet 1 mg      -- Oral Daily        CBC:     Recent Labs  Lab 06/03/18  0422   WBC 5.94   RBC 2.47*   HGB 7.9*   HCT 23.5*   PLT 18*   MCV 95   MCH 32.0*   MCHC 33.6     CMP:     Recent Labs  Lab 06/03/18  0422      CALCIUM 8.5*   ALBUMIN 2.6*   PROT 5.6*      K 5.1   CO2 21*      BUN 46*   CREATININE 2.4*   ALKPHOS 96   ALT 13   AST 14   BILITOT 1.3*     Labs within the past 24 hours have been reviewed.    Diagnostic Results:  pertinent imaging reviewed.     Assessment/Plan:     * Abdominal pain    - admit with abdominal pain  - denies N/V  - xray with significant stool, no obstruction noted  - LFTs stable  - CT A/P with ascites, anasarca, and moderate stool. No other significant/acute findings noted.   - lactulose,  suppository, and kayexalate given without response. Brown bomb enema given 5/29 resulting in small-medium BM. Given again 5/30 x2 per patient request with BMs noted   - KUB 5/31 still with significant amounts of stool --> Miralax ordered with minimal result   - Golytely ordered 6/1 as well as brown bomb enema   - Explained again need to withhold narcotics as constipation felt to be source of patient's pain   - repeat KUB unchanged  - paracentesis to assess for infectious process          Severe protein-calorie malnutrition    - continue dronabinol for appetite stimulant  - po supplements  - appetite improving since initiation of steroids  - reglan on hold d/t mental status and jerking movement changes        Anemia of chronic renal failure    - h/h chronically low, stable  - received 1 unit PRBC 5/30/18  - restart Epo on MW (HD days)  - monitor cbc daily  - hematology following, appreciate assistance   - transfused 1 unit prbc with HD 6/2 with good response          Biliary stricture of transplanted liver              Liver transplanted    - S/p liver transplant with multiple admissions and last ERCP 5/4   - stent in place per imaging  - LFTs stable  - monitor         Constipation    - patient with chronic constipation  - stool softeners ordered  - lactulose, suppository, and kayexalate given without response. Brown bomb enema  Successfully given 5/29 and 5/30  - miralax added to regimen   - Golytely started 6/1  - see above for further details.         Long-term use of immunosuppressant medication    - Pt on  steroids. Continue to monitor for therapeutic levels and toxicities. Adjust doses accordingly.  - Continuing to hold cyclo for mental status changes and hemolytic anemia/thrombocytopenia   - Start Rapamune 1 mg today- monitor pt's CBC closely as may not tolerated  - Check Rapa level on Monday  - Continue pred 60 mg until Monday to support pt's blood counts, then start taper of 10 mg weekly if he tolerates- pt  "to continue 20 mg once tapers to this dose          Prophylactic immunotherapy    - please see long term IS        At risk for opportunistic infections    - pentamidine for pcp prophylaxis (last treatment 5/21)  - valcyte on hold as neutropenic, continue surveillance CMV PCR ( last checked 5/22, undetected). CMV PCR 5/29, pending  - fluconazole for antigungal          Anxiety about health    - Pt admitted with cognitive slowing, confusion, being "off", twitchy mouth movements, and slight hand tremors  - CT negative for acute processes  - Bcx NGTD, UA clean, UCx pending.  - Cont hold cyclo for now  - jerky movements could be related to reglan (?tardive dyskinesias), will hold  - mental status much improved today. Less sluggish with appropriate responses. Still remains tearful and emotional regarding future/current health situation   - dependent on mom and becomes emotional when talking about all she has done to help him   - pt requested psych consult for establishment of relationship for outpatient psychotherapy. Consult placed. appreciate assistance.            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  Per hematology this may be reactive to underlying organ dysfunction, drug and or peripheral destruction (specifically referring to platelets  - HIT panel negative previously.   - Per previous Heme recommendations on last admit, cont with steroids  - restarted cyclosporine 5/21  - hematology re consulted this admission, appreciate recs and assistance.    - continue steroids   - transfuse for PLTS <10  - received 1 pack PLTs 5/28 d/t PLT count of 9. Responded well.           ESRD Monday/Friday    - nephrology following  - outpatient HD MF schedule  - patient to HD 5/28, but adamant about being taken off after 45 minutes.     -with hyperkalemia d/t incomplete HD overnight. Given " insulin/dextrose, calcium gluconate, diuretics, and bicarb with improvement.  - Pt to HD again 5/29 for electrolyte and toxin clearance and volume removal.   - restart epo for HD days  - patient with more anxiety/pain at HD  - HD 6/2            VTE Risk Mitigation         Ordered     Place ISMA hose  Until discontinued      05/27/18 2019     Place sequential compression device  Until discontinued      05/27/18 2019     Reason for No Pharmacological VTE Prophylaxis  Once      05/27/18 2019     IP VTE HIGH RISK PATIENT  Once      05/27/18 2019          The patients clinical status was discussed at multidisplinary rounds, involving transplant surgery, transplant medicine, pharmacy, nursing, nutrition, and social work    Discharge Planning:  Monitor kidney function --> may need outpt HD  Monitor HH needs vs rehab for deconditioned status      Seda Camacho, NP  Liver Transplant  [facility]

## 2018-06-03 NOTE — PLAN OF CARE
MD Sifuentes notified of critical platelet level of 18.  No new orders at this time.  Will continue to monitor pt.

## 2018-06-04 VITALS
SYSTOLIC BLOOD PRESSURE: 138 MMHG | HEART RATE: 76 BPM | BODY MASS INDEX: 23.1 KG/M2 | RESPIRATION RATE: 15 BRPM | OXYGEN SATURATION: 99 % | WEIGHT: 143.75 LBS | HEIGHT: 66 IN | TEMPERATURE: 99 F | DIASTOLIC BLOOD PRESSURE: 86 MMHG

## 2018-06-04 DIAGNOSIS — Z94.4 STATUS POST LIVER TRANSPLANT: Primary | ICD-10-CM

## 2018-06-04 LAB
ALBUMIN SERPL BCP-MCNC: 3 G/DL
ALP SERPL-CCNC: 109 U/L
ALT SERPL W/O P-5'-P-CCNC: 14 U/L
ANION GAP SERPL CALC-SCNC: 12 MMOL/L
ANISOCYTOSIS BLD QL SMEAR: SLIGHT
AST SERPL-CCNC: 17 U/L
BASOPHILS # BLD AUTO: 0.01 K/UL
BASOPHILS NFR BLD: 0.1 %
BILIRUB SERPL-MCNC: 1.1 MG/DL
BUN SERPL-MCNC: 54 MG/DL
BURR CELLS BLD QL SMEAR: ABNORMAL
CALCIUM SERPL-MCNC: 9 MG/DL
CHLORIDE SERPL-SCNC: 109 MMOL/L
CO2 SERPL-SCNC: 17 MMOL/L
CREAT SERPL-MCNC: 2.9 MG/DL
DACRYOCYTES BLD QL SMEAR: ABNORMAL
DIFFERENTIAL METHOD: ABNORMAL
EOSINOPHIL # BLD AUTO: 0 K/UL
EOSINOPHIL NFR BLD: 0 %
ERYTHROCYTE [DISTWIDTH] IN BLOOD BY AUTOMATED COUNT: 17 %
EST. GFR  (AFRICAN AMERICAN): 32.5 ML/MIN/1.73 M^2
EST. GFR  (NON AFRICAN AMERICAN): 28.1 ML/MIN/1.73 M^2
GLUCOSE SERPL-MCNC: 125 MG/DL
HCT VFR BLD AUTO: 29.8 %
HGB BLD-MCNC: 9.7 G/DL
HYPOCHROMIA BLD QL SMEAR: ABNORMAL
IMM GRANULOCYTES # BLD AUTO: 0.08 K/UL
IMM GRANULOCYTES NFR BLD AUTO: 1.2 %
INR PPP: 1
LYMPHOCYTES # BLD AUTO: 0.7 K/UL
LYMPHOCYTES NFR BLD: 10 %
MAGNESIUM SERPL-MCNC: 2.6 MG/DL
MCH RBC QN AUTO: 32 PG
MCHC RBC AUTO-ENTMCNC: 32.6 G/DL
MCV RBC AUTO: 98 FL
MONOCYTES # BLD AUTO: 0.4 K/UL
MONOCYTES NFR BLD: 5.9 %
NEUTROPHILS # BLD AUTO: 5.7 K/UL
NEUTROPHILS NFR BLD: 82.8 %
NRBC BLD-RTO: 0 /100 WBC
PHOSPHATE SERPL-MCNC: 3.9 MG/DL
PLATELET # BLD AUTO: 26 K/UL
PLATELET BLD QL SMEAR: ABNORMAL
PMV BLD AUTO: 10.1 FL
POIKILOCYTOSIS BLD QL SMEAR: SLIGHT
POLYCHROMASIA BLD QL SMEAR: ABNORMAL
POTASSIUM SERPL-SCNC: 5.4 MMOL/L
PROT SERPL-MCNC: 6.7 G/DL
PROTHROMBIN TIME: 10.4 SEC
RBC # BLD AUTO: 3.03 M/UL
SIROLIMUS BLD-MCNC: 2.7 NG/ML
SODIUM SERPL-SCNC: 138 MMOL/L
WBC # BLD AUTO: 6.82 K/UL

## 2018-06-04 PROCEDURE — 85610 PROTHROMBIN TIME: CPT | Mod: NTX

## 2018-06-04 PROCEDURE — 99239 HOSP IP/OBS DSCHRG MGMT >30: CPT | Mod: NTX,,, | Performed by: NURSE PRACTITIONER

## 2018-06-04 PROCEDURE — 80195 ASSAY OF SIROLIMUS: CPT | Mod: NTX

## 2018-06-04 PROCEDURE — 84100 ASSAY OF PHOSPHORUS: CPT | Mod: NTX

## 2018-06-04 PROCEDURE — 25000003 PHARM REV CODE 250: Mod: NTX | Performed by: STUDENT IN AN ORGANIZED HEALTH CARE EDUCATION/TRAINING PROGRAM

## 2018-06-04 PROCEDURE — 63600175 PHARM REV CODE 636 W HCPCS: Mod: NTX | Performed by: STUDENT IN AN ORGANIZED HEALTH CARE EDUCATION/TRAINING PROGRAM

## 2018-06-04 PROCEDURE — 80053 COMPREHEN METABOLIC PANEL: CPT | Mod: NTX

## 2018-06-04 PROCEDURE — 99232 SBSQ HOSP IP/OBS MODERATE 35: CPT | Mod: NTX,,, | Performed by: NURSE PRACTITIONER

## 2018-06-04 PROCEDURE — 25000003 PHARM REV CODE 250: Mod: NTX | Performed by: NURSE PRACTITIONER

## 2018-06-04 PROCEDURE — 85025 COMPLETE CBC W/AUTO DIFF WBC: CPT | Mod: NTX

## 2018-06-04 PROCEDURE — 63600175 PHARM REV CODE 636 W HCPCS: Mod: NTX | Performed by: PHYSICIAN ASSISTANT

## 2018-06-04 PROCEDURE — 83735 ASSAY OF MAGNESIUM: CPT | Mod: NTX

## 2018-06-04 PROCEDURE — 25000003 PHARM REV CODE 250: Mod: NTX | Performed by: PHYSICIAN ASSISTANT

## 2018-06-04 PROCEDURE — 36415 COLL VENOUS BLD VENIPUNCTURE: CPT | Mod: NTX

## 2018-06-04 RX ORDER — POLYETHYLENE GLYCOL 3350 17 G/17G
17 POWDER, FOR SOLUTION ORAL 2 TIMES DAILY
Status: DISCONTINUED | OUTPATIENT
Start: 2018-06-04 | End: 2018-06-04 | Stop reason: HOSPADM

## 2018-06-04 RX ORDER — RISPERIDONE 0.5 MG/1
0.5 TABLET ORAL NIGHTLY
Qty: 30 TABLET | Refills: 11 | Status: SHIPPED | OUTPATIENT
Start: 2018-06-04 | End: 2018-06-04 | Stop reason: HOSPADM

## 2018-06-04 RX ORDER — POLYETHYLENE GLYCOL 3350 17 G/17G
17 POWDER, FOR SOLUTION ORAL 2 TIMES DAILY
Qty: 60 EACH | Refills: 3 | Status: SHIPPED | OUTPATIENT
Start: 2018-06-04 | End: 2018-06-19

## 2018-06-04 RX ORDER — RISPERIDONE 0.5 MG/1
0.5 TABLET ORAL NIGHTLY
Status: DISCONTINUED | OUTPATIENT
Start: 2018-06-04 | End: 2018-06-04 | Stop reason: HOSPADM

## 2018-06-04 RX ORDER — PREDNISONE 20 MG/1
TABLET ORAL
Qty: 75 TABLET | Refills: 11 | Status: SHIPPED | OUTPATIENT
Start: 2018-06-04 | End: 2018-07-31

## 2018-06-04 RX ORDER — SODIUM CHLORIDE 9 MG/ML
INJECTION, SOLUTION INTRAVENOUS
Status: DISCONTINUED | OUTPATIENT
Start: 2018-06-04 | End: 2018-06-04 | Stop reason: HOSPADM

## 2018-06-04 RX ORDER — SEVELAMER CARBONATE 800 MG/1
1600 TABLET, FILM COATED ORAL
Qty: 180 TABLET | Refills: 11 | Status: SHIPPED | OUTPATIENT
Start: 2018-06-04 | End: 2019-04-12

## 2018-06-04 RX ORDER — SODIUM CHLORIDE 9 MG/ML
INJECTION, SOLUTION INTRAVENOUS ONCE
Status: DISCONTINUED | OUTPATIENT
Start: 2018-06-04 | End: 2018-06-04 | Stop reason: HOSPADM

## 2018-06-04 RX ORDER — TORSEMIDE 20 MG/1
60 TABLET ORAL DAILY
Qty: 90 TABLET | Refills: 2 | Status: SHIPPED | OUTPATIENT
Start: 2018-06-04 | End: 2018-08-20 | Stop reason: SDUPTHER

## 2018-06-04 RX ORDER — SIROLIMUS 1 MG/1
1 TABLET, FILM COATED ORAL DAILY
Qty: 30 TABLET | Refills: 11 | Status: SHIPPED | OUTPATIENT
Start: 2018-06-05 | End: 2018-08-20 | Stop reason: SDUPTHER

## 2018-06-04 RX ORDER — GABAPENTIN 100 MG/1
300 CAPSULE ORAL NIGHTLY
Qty: 90 CAPSULE | Refills: 11 | Status: SHIPPED | OUTPATIENT
Start: 2018-06-04 | End: 2018-06-07

## 2018-06-04 RX ADMIN — PREDNISONE 60 MG: 10 TABLET ORAL at 08:06

## 2018-06-04 RX ADMIN — SEVELAMER CARBONATE 1600 MG: 800 TABLET, FILM COATED ORAL at 07:06

## 2018-06-04 RX ADMIN — LEVETIRACETAM 500 MG: 500 TABLET ORAL at 08:06

## 2018-06-04 RX ADMIN — DOCUSATE SODIUM 100 MG: 100 CAPSULE, LIQUID FILLED ORAL at 08:06

## 2018-06-04 RX ADMIN — SIROLIMUS 1 MG: 1 TABLET, SUGAR COATED ORAL at 08:06

## 2018-06-04 RX ADMIN — URSODIOL 300 MG: 300 CAPSULE ORAL at 08:06

## 2018-06-04 RX ADMIN — SIMETHICONE CHEW TAB 80 MG 80 MG: 80 TABLET ORAL at 08:06

## 2018-06-04 RX ADMIN — FLUCONAZOLE 200 MG: 200 TABLET ORAL at 08:06

## 2018-06-04 RX ADMIN — LEVOTHYROXINE SODIUM 75 MCG: 75 TABLET ORAL at 05:06

## 2018-06-04 RX ADMIN — OXYCODONE HYDROCHLORIDE 5 MG: 5 TABLET ORAL at 07:06

## 2018-06-04 RX ADMIN — POLYETHYLENE GLYCOL 3350 17 G: 17 POWDER, FOR SOLUTION ORAL at 08:06

## 2018-06-04 RX ADMIN — BISACODYL 10 MG: 5 TABLET, COATED ORAL at 08:06

## 2018-06-04 RX ADMIN — AMLODIPINE BESYLATE 5 MG: 5 TABLET ORAL at 08:06

## 2018-06-04 NOTE — MEDICAL/APP STUDENT
6/4/2018 9:21 AM   Jhonny Lebron   1990   [id number]        PSYCHIATRY CONSULT PROGRESS NOTE       BRIEF HPI     Patient is a 28 yo M with a past medical history of liver transplant complicated by biliary stricture.  He has been re-admitted numerous times in the last year. He presented to the ED on 5/27/2018 for worsening abdominal pain and distention.  The patient has a past psychiatric history of anxiety when he was 14 yo. At that time he was placed on Prozac but did not tolerate the side effects. Patient started becoming confused and overwhelmed on 5/28/2018 during HD treatment. Yesterday, the patient had periods of confusion with AAO x2 and had delayed responses. Patient believed that he had already completed certians tasks during the day. Patient had a BUN 84 prior to agitated episode yesterday prior to dialysis that could contribute to a delirious state.      Interval History:  (5/30/2018): The patient states that he has been feeling down about not knowing what will happen in regards to his health in the future.  The patient became tearful when discussing his medical problems.  He looks forward to getting out of the hospital to read, write, and play video games. He feels most hopeless when receiving hemodialysis.  The patient denies suicidal ideation.  The patient has had decreased sleep for the past 2 weeks and often wakes up at night with SOB and palpitation. Patient had a negative CAM-ICU.     (5/31/2018): Pt reports that he is in a good mood this morning. The patient appeared to have a flat affect.  Patient was oriented to person, place, time. He had decreased concentration with failure of serial 7's.  His thought content was normal with no depression and SI/HI.  The patient;s thought process was logical and coherent.  The patient does not report hallucinations, illusions, or delusions.  The patient was CAM-ICU negative with passing SAVEAHAART. EKG findings showed a QTc of 418. Increased Risperidone to 1  mg PO.     (6/1/2108): Pt reports he is feeling okay despite the abdominal pain.  He states that the medication does help with the pain.  He was able to sleep more last night compared to previous nights.  The patient's mother did not notice any confusion last night.  She mainly was concerned about controlling his pain to help him sleep more at night.  The patient was oriented x4 to person, place, time, and situation.  The patient notes increased appetite.  The patient was CAM-ICU negative with passing the SAVEAHAART.    ASSESSMENT     Delirium  - patient does not appear to have delirium  - CAM-ICU negative with passing SAVEAHAART  - not signs of confusion over the weekend  - Recommend tapering the Risperidone to  0.5 mg PO today    Capacity  - Patient is requesting to be discharged today  - Patient believes that he in only in the hospital for pain control  - Patient states that the risks of leaving the hospital have not been communicated to him  - Patient appears to have capacity     RECOMMENDATIONS      · PSYCH Meds- Taper Risperidone to 0.5 mg PO today and discontinue tomorrow  · Legal status- N/A  · The above will be discussed with staff psychiatrist and update any changes after rounds in the afternoon.  · Thank you for this consult will continue to follow      ----------------------------------------------------------------------------------------------------------------------  SUBJECTIVE:     Today,  Pt reports that he is in extreme pain and is frustrated that he is not getting enough pain medication.  He believes that dilaudid would help his pain.  He states that he does not believe that his pain medication is the cause of his constipation, since the pain has not resolves since discontinuing the opioid medication.  The patient reports that he is feeling hopeless and wishes to leave the hospital.  He expresses that he feels the doctors don't know what is causing the pain.  The patient is unsure about what is  "causing the pain and would just rather be home.  The patient appears to have capacity to make decisions.  The patient does not have SI/HI, delusions, hallucinations, or illusions.  The patient does not appear to be in a delirious state.  He is CAM-ICU negative with passing SAVEAHAART.  The has been no signs of confusion or agitation over the weekend.      Collateral: Mother states that she is concerned about managing the patients pain.  She says that it is frustrating when doctors tell her "he is a mystery to us." However, she believes that he should stay in the hospital.  She is concerned that the patient will do something reckless when he leaves the hospital.  She reports no signs of confusion or agitation.        Current Medications:   Scheduled Meds:    sodium chloride 0.9%   Intravenous Once    amLODIPine  5 mg Oral Daily    bisacodyl  10 mg Oral Daily    docusate sodium  100 mg Oral BID    dronabinol  2.5 mg Oral QHS    epoetin maurisio  20,000 Units Subcutaneous Every Mon, Fri    famotidine  20 mg Oral QHS    fluconazole  200 mg Oral Daily    gabapentin  300 mg Oral QHS    glycerin 99.5%  100 mL Rectal Once    And    magnesium citrate  300 mL Rectal Once    And    sodium chloride 0.9%  500 mL Rectal Once    levETIRAcetam  500 mg Oral BID    levothyroxine  75 mcg Oral Before breakfast    polyethylene glycol  17 g Oral BID    predniSONE  60 mg Oral Daily    risperiDONE  0.5 mg Oral QHS    sevelamer carbonate  1,600 mg Oral TID WM    simethicone  1 tablet Oral TID PC    sirolimus  1 mg Oral Daily    sodium chloride 0.9%  500 mL Rectal Once    ursodiol  300 mg Oral BID      PRN Meds: sodium chloride, acetaminophen, dextrose 50%, diphenhydrAMINE, gentamicin, glucagon (human recombinant), ondansetron, oxyCODONE, ramelteon, sodium chloride 0.9%   Psychotherapeutics     Start     Stop Route Frequency Ordered    06/04/18 2100  risperiDONE tablet 0.5 mg      -- Oral Nightly 06/04/18 0810    05/27/18 " 2116  ramelteon tablet 8 mg      -- Oral Nightly PRN 05/27/18 2019          Allergies:   Review of patient's allergies indicates:   Allergen Reactions    Bactrim [sulfamethoxazole-trimethoprim] Other (See Comments)     Mookie Trell Syndrome        Past Medical History:   Diagnosis Date    Alcoholic hepatitis with ascites     Alcoholic hepatitis with ascites     ESRD on dialysis     History of hematemesis 9/28/2017    Hypertension     Renal disorder     Seizures        OBJECTIVE:   Vitals   Vitals:    06/04/18 0857   BP: (!) 141/85   Pulse: 81   Resp: 16   Temp: 98.5 °F (36.9 °C)        Labs/Imaging/Studies:   Recent Results (from the past 36 hour(s))   Comprehensive metabolic panel    Collection Time: 06/03/18  4:22 AM   Result Value Ref Range    Sodium 137 136 - 145 mmol/L    Potassium 5.1 3.5 - 5.1 mmol/L    Chloride 109 95 - 110 mmol/L    CO2 21 (L) 23 - 29 mmol/L    Glucose 100 70 - 110 mg/dL    BUN, Bld 46 (H) 6 - 20 mg/dL    Creatinine 2.4 (H) 0.5 - 1.4 mg/dL    Calcium 8.5 (L) 8.7 - 10.5 mg/dL    Total Protein 5.6 (L) 6.0 - 8.4 g/dL    Albumin 2.6 (L) 3.5 - 5.2 g/dL    Total Bilirubin 1.3 (H) 0.1 - 1.0 mg/dL    Alkaline Phosphatase 96 55 - 135 U/L    AST 14 10 - 40 U/L    ALT 13 10 - 44 U/L    Anion Gap 7 (L) 8 - 16 mmol/L    eGFR if African American 40.9 (A) >60 mL/min/1.73 m^2    eGFR if non  35.4 (A) >60 mL/min/1.73 m^2   Magnesium    Collection Time: 06/03/18  4:22 AM   Result Value Ref Range    Magnesium 2.1 1.6 - 2.6 mg/dL   Phosphorus    Collection Time: 06/03/18  4:22 AM   Result Value Ref Range    Phosphorus 3.7 2.7 - 4.5 mg/dL   CBC auto differential    Collection Time: 06/03/18  4:22 AM   Result Value Ref Range    WBC 5.94 3.90 - 12.70 K/uL    RBC 2.47 (L) 4.60 - 6.20 M/uL    Hemoglobin 7.9 (L) 14.0 - 18.0 g/dL    Hematocrit 23.5 (L) 40.0 - 54.0 %    MCV 95 82 - 98 fL    MCH 32.0 (H) 27.0 - 31.0 pg    MCHC 33.6 32.0 - 36.0 g/dL    RDW 16.9 (H) 11.5 - 14.5 %    Platelets 18  (LL) 150 - 350 K/uL    MPV 11.8 9.2 - 12.9 fL    Immature Granulocytes 2.0 (H) 0.0 - 0.5 %    Gran # (ANC) 4.6 1.8 - 7.7 K/uL    Immature Grans (Abs) 0.12 (H) 0.00 - 0.04 K/uL    Lymph # 0.7 (L) 1.0 - 4.8 K/uL    Mono # 0.5 0.3 - 1.0 K/uL    Eos # 0.0 0.0 - 0.5 K/uL    Baso # 0.00 0.00 - 0.20 K/uL    nRBC 0 0 /100 WBC    Gran% 78.1 (H) 38.0 - 73.0 %    Lymph% 11.8 (L) 18.0 - 48.0 %    Mono% 8.1 4.0 - 15.0 %    Eosinophil% 0.0 0.0 - 8.0 %    Basophil% 0.0 0.0 - 1.9 %    Platelet Estimate Decreased (A)     Aniso Slight     Poik Slight     Ovalocytes Occasional     Tear Drop Cells Occasional     Differential Method Automated    Protime-INR    Collection Time: 06/03/18  4:22 AM   Result Value Ref Range    Prothrombin Time 10.8 9.0 - 12.5 sec    INR 1.0 0.8 - 1.2   Comprehensive metabolic panel    Collection Time: 06/04/18  4:37 AM   Result Value Ref Range    Sodium 138 136 - 145 mmol/L    Potassium 5.4 (H) 3.5 - 5.1 mmol/L    Chloride 109 95 - 110 mmol/L    CO2 17 (L) 23 - 29 mmol/L    Glucose 125 (H) 70 - 110 mg/dL    BUN, Bld 54 (H) 6 - 20 mg/dL    Creatinine 2.9 (H) 0.5 - 1.4 mg/dL    Calcium 9.0 8.7 - 10.5 mg/dL    Total Protein 6.7 6.0 - 8.4 g/dL    Albumin 3.0 (L) 3.5 - 5.2 g/dL    Total Bilirubin 1.1 (H) 0.1 - 1.0 mg/dL    Alkaline Phosphatase 109 55 - 135 U/L    AST 17 10 - 40 U/L    ALT 14 10 - 44 U/L    Anion Gap 12 8 - 16 mmol/L    eGFR if  32.5 (A) >60 mL/min/1.73 m^2    eGFR if non  28.1 (A) >60 mL/min/1.73 m^2   Magnesium    Collection Time: 06/04/18  4:37 AM   Result Value Ref Range    Magnesium 2.6 1.6 - 2.6 mg/dL   Phosphorus    Collection Time: 06/04/18  4:37 AM   Result Value Ref Range    Phosphorus 3.9 2.7 - 4.5 mg/dL   CBC auto differential    Collection Time: 06/04/18  4:37 AM   Result Value Ref Range    WBC 6.82 3.90 - 12.70 K/uL    RBC 3.03 (L) 4.60 - 6.20 M/uL    Hemoglobin 9.7 (L) 14.0 - 18.0 g/dL    Hematocrit 29.8 (L) 40.0 - 54.0 %    MCV 98 82 - 98 fL    MCH  32.0 (H) 27.0 - 31.0 pg    MCHC 32.6 32.0 - 36.0 g/dL    RDW 17.0 (H) 11.5 - 14.5 %    Platelets 26 (LL) 150 - 350 K/uL    MPV 10.1 9.2 - 12.9 fL    Immature Granulocytes 1.2 (H) 0.0 - 0.5 %    Gran # (ANC) 5.7 1.8 - 7.7 K/uL    Immature Grans (Abs) 0.08 (H) 0.00 - 0.04 K/uL    Lymph # 0.7 (L) 1.0 - 4.8 K/uL    Mono # 0.4 0.3 - 1.0 K/uL    Eos # 0.0 0.0 - 0.5 K/uL    Baso # 0.01 0.00 - 0.20 K/uL    nRBC 0 0 /100 WBC    Gran% 82.8 (H) 38.0 - 73.0 %    Lymph% 10.0 (L) 18.0 - 48.0 %    Mono% 5.9 4.0 - 15.0 %    Eosinophil% 0.0 0.0 - 8.0 %    Basophil% 0.1 0.0 - 1.9 %    Platelet Estimate Decreased (A)     Aniso Slight     Poik Slight     Poly Occasional     Hypo Occasional     Tear Drop Cells Occasional     Nargis Cells Occasional     Differential Method Automated    Protime-INR    Collection Time: 06/04/18  4:37 AM   Result Value Ref Range    Prothrombin Time 10.4 9.0 - 12.5 sec    INR 1.0 0.8 - 1.2   Sirolimus level    Collection Time: 06/04/18  4:37 AM   Result Value Ref Range    Sirolimus Lvl 2.7 (L) 4.0 - 20.0 ng/mL          Medical ROS  General ROS: negative for - chills, fatigue or fever  Respiratory ROS: no cough, shortness of breath, or wheezing  Cardiovascular ROS: no chest pain or dyspnea on exertion  Gastrointestinal ROS: abdominal pain, better bowel movements, no black or bloody stools  Musculoskeletal ROS: back pain, negative for - gait disturbance, joint stiffness, muscle pain or muscular weakness  Neurological ROS: negative for - confusion, dizziness, gait disturbance, headaches, impaired coordination/balance, seizures or visual changes      Mental Status Exam:  Appearance: age appropriate, thin, seated in chair   Behavior/Cooperation: limited/ appopriate cooperative, eye contact minimal, tearing up  Speech: appropriate rate, volume and tone normal tone, normal rate, normal pitch, normal volume  Language: grossly intact, able to name, able to repeat with spontaneous speech  Mood: angry, sad  Affect:   congruent with mood and appropriate to situation/content Grossly intact  Thought Process: normal and logical  Thought Content: normal, no suicidality, no homicidality, delusions, or paranoia  Sensorium:  Awake  Alert and Oriented: x3 grossly intact, person, place, situation, month of year, year  Memory: 3/3 immediate, 3/3 at 5 minutes    Recent:  Intact; able to report recent events   Remote:  Intact; Named 4/4 past presidents   Attention/concentration: decreased, failed serial 7's (able to get 93)   Similarities:  Intact; (difference between apple and orange: intact)  Abstract reasoning:  Intact  Insight:  Intact  Judgment: Intact      RASS- 0  CAM ICU- Negative      Nicole Clifford, MS3  6/4/2018 9:21 AM

## 2018-06-04 NOTE — PLAN OF CARE
Problem: Patient Care Overview  Goal: Plan of Care Review  Outcome: Ongoing (interventions implemented as appropriate)  Pt AAOx4, vital signs stable.  Pt up walking halls tonight.  PRN oxycodone 5mg given x1 so far thus shift.  Pt not requesting anything else for pain tonight.  1 dose of platelets given tonight in preparation for para in AM.  Tolerated well.  Mother at bedside.  Bed lowered, locked, side rails up x2, and call bell in reach.  See flow sheet for assessment findings.  Will continue to monitor pt.

## 2018-06-04 NOTE — TELEPHONE ENCOUNTER
----- Message from Shereen Mcguire DNP sent at 6/4/2018  2:54 PM CDT -----  See discharge summary for any information I may not have communicated. [facility] on hold for leukopenia. Monitoring CMV PCR weekly, last level 5/29, next level due 6/5, can get with next lab draw Friday. Needs at least 2x week labs.

## 2018-06-04 NOTE — PROGRESS NOTES
Patient dcd per MD order. AVS given to the patients mother.  Updated blue card given to the patient.  Patient notified of dialysis seat today.   Appointments given to patient and patients mother. PIV dcd, patient tolerated well.  Patient and patients mother gave full verbal understanding on all written and verbal DC instructions.

## 2018-06-04 NOTE — DISCHARGE SUMMARY
"[facility]  Liver Transplant  Discharge Summary      Patient Name: Jhonny Diana  MRN: [id number]  Admission Date: 2018  Hospital Length of Stay: 8 days  Discharge Date and Time:  2018 12:18 PM  Attending Physician: Nathanael Medina MD   Discharging Provider: Shereen Mcguire DNP  Primary Care Provider: Primary Doctor No  Post-Operative Day: 228     ORGAN:   LIVER  Disease Etiology: Acute Alcoholic Hepatitis  Donor Type:    - Brain Death  CDC High Risk:   No  Donor CMV Status:   Donor CMV Status: Positive  Donor HBcAB:   Negative  Donor HCV Status:   Negative  Whole or Partial: Whole Liver  Biliary Anastomosis: End to End  Arterial Anatomy: Standard    HPI:   Mr. 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  S/p OLTX 10/19/2017 (Liver) with steroid induction. Post op course with multiple complications to include biliary stricture, FUO, ESRD, and chronic abdominal pain. Recently discharged on  after workup for abdominal pain and thrombocytopenia. He presented  with worsening abdominal pain since Friday and distention. Denies nausea, vomiting, sob, cp, fevers, or any other new or concerning symptoms. Reports no BM x3 days.        Hospital Course: Patient with persistent abdominal pain and chronic constipation despite po laxatives/stool softeners. CT A/P  with ascites, anasarca, and moderate stool, no other significant/acute findings noted. Multiple po laxatives/stool softeners ordered, enema's, and golytely with persistent constipation. Constipation attributed to narcotic use being that patient frequently ambulates. GI follow up scheduled for 2018 for narcotic bowel. Abdominal pain did not improve with BM's. Also on admission, patient to cognitive slowing, confusion, being "off", twitchy mouth movements, and slight hand tremors. CT head  negative for acute process. Blood, UA, urine cultures NGTD. Jerky movements attributed to tardive dyskinesias from " reglan, which was d/c'd in addition to cyclosporine. Mental status and jerky movements resolved shortly after d/c'ing reglan and cyclosporine. Patient more emotional and overwhelmed during this admission, once refusing HD due to feeling overwhelmed. Psych was consulted, recommended sleep aid, trialed risperadal (per psych's rec) and rozerem without improvement in sleep. Encouraged patient to follow up with psych and ABU at discharge. In addition, patient should follow up with pain management as outpatient for other pain modalities. Gabapentin started while inpatient with minimal improvement in pain.    Persistent pancytopenia. Hematology consulted previous admission and this admission for severe thrombocytopenia. 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 Per hematology, this may be reactive to underlying organ dysfunction, drug, or peripheral destruction (specifically referring to platelets). Per hematology, patient continued on steroids with minimal improvement in platelets. Patient still required PRN PRBC transfusions.     Shortly after admission, cyclosporine held for AMS and hemolytic anemia/thrombocytopenia. Patient was started on steroids in interim. Cyclosporine restarted 5/21 once AMS resolved. Due to h/o seizures with tacrolimus, and AMS/severe pancytopenia, transplant team decided to transitioned from cyclosporine to rapamune 6/1. Needs slow, careful titration of rapamune and monitoring of pancytopenia, d/c'd on 1mg/day.     Nephrology was consulted while inpatient for HD needs. Patient continues on Monday/Friday HD schedule. Torsemide added for addition diuresis. Despite abdominal pain not improved this admission, patient adamant about discharge today. Medically, patient is stable for discharge today into the care of his mother. Mother concerned about patient making poor medical decisions based on pain  and distrust with medical staff. Hepatology appointment made with Dr. Rogers on 6/11. Continue to monitor rapamune levels and thrombocytopenia.       Final Active Diagnoses:    Diagnosis Date Noted POA    PRINCIPAL PROBLEM:  Abdominal pain [R10.9] 05/27/2018 Yes    Anemia of chronic renal failure [N18.9, D63.1]  Yes    S/P liver transplant [Z94.4] 10/20/2017 Not Applicable     Chronic    Anxiety about health [F41.8] 05/30/2018 No    Thrombocytopenia [D69.6] 05/12/2018 Yes    Constipation [K59.00] 01/20/2018 Yes    ESRD Monday/Friday [N18.6] 01/13/2018 Yes    Severe protein-calorie malnutrition [E43] 01/11/2018 Yes    At risk for opportunistic infections [Z91.89] 10/26/2017 Yes    Long-term use of immunosuppressant medication [Z79.899] 10/20/2017 Not Applicable     Chronic    Prophylactic immunotherapy [Z29.8] 10/20/2017 Not Applicable     Chronic      Problems Resolved During this Admission:    Diagnosis Date Noted Date Resolved POA    Acute confusional state [F05] 06/01/2018 06/03/2018 Yes       Consults         Status Ordering Provider     Inpatient consult to Hematology  Once     Provider:  (Not yet assigned)    Completed KEATON NIÑO     Inpatient consult to Liver Transplant  Once     Provider:  (Not yet assigned)    BETH Early     Inpatient consult to Nephrology  Once     Provider:  (Not yet assigned)    KEATON Gandara     Inpatient consult to Psychiatry  Once     Provider:  (Not yet assigned)    KEATON Gandara          Pending Diagnostic Studies:     Procedure Component Value Units Date/Time    IR Paracentesis with Imaging [[id number]]     Order Status:  Sent Lab Status:  No result     Specimen:  Peritoneal Fluid         Significant Diagnostic Studies: Labs:   CMP   Recent Labs  Lab 06/03/18  0422 06/04/18  0437    138   K 5.1 5.4*    109   CO2 21* 17*    125*   BUN 46* 54*   CREATININE 2.4* 2.9*   CALCIUM 8.5* 9.0   PROT 5.6* 6.7    ALBUMIN 2.6* 3.0*   BILITOT 1.3* 1.1*   ALKPHOS 96 109   AST 14 17   ALT 13 14   ANIONGAP 7* 12   ESTGFRAFRICA 40.9* 32.5*   EGFRNONAA 35.4* 28.1*   , CBC   Recent Labs  Lab 06/03/18  0422 06/04/18  0437   WBC 5.94 6.82   HGB 7.9* 9.7*   HCT 23.5* 29.8*   PLT 18* 26*   , INR   Lab Results   Component Value Date    INR 1.0 06/04/2018    INR 1.0 06/03/2018    INR 1.0 06/02/2018    and All labs within the past 24 hours have been reviewed    The patients clinical status was discussed at multidisplinary rounds, involving transplant surgery, transplant medicine, pharmacy, nursing, nutrition, and social work    Discharged Condition: good    Disposition: Home or Self Care    Follow Up: see hospital course.    Patient Instructions:     Diet renal     Activity as tolerated     Notify your health care provider if you experience any of the following:  temperature >100.4     Notify your health care provider if you experience any of the following:  persistent nausea and vomiting or diarrhea     Notify your health care provider if you experience any of the following:  severe uncontrolled pain     Notify your health care provider if you experience any of the following:  redness, tenderness, or signs of infection (pain, swelling, redness, odor or green/yellow discharge around incision site)     Notify your health care provider if you experience any of the following:  difficulty breathing or increased cough     Notify your health care provider if you experience any of the following:  severe persistent headache     Notify your health care provider if you experience any of the following:  worsening rash     Notify your health care provider if you experience any of the following:  persistent dizziness, light-headedness, or visual disturbances     Notify your health care provider if you experience any of the following:  increased confusion or weakness     No dressing needed       Medications:  Reconciled Home Medications:       Medication List      START taking these medications    polyethylene glycol 17 gram Pwpk  Commonly known as:  GLYCOLAX  Mix 1 packet (17 g) in liquid and take by mouth 2 (two) times daily.     sirolimus 1 MG Tab  Commonly known as:  RAPAMUNE  Take 1 tablet (1 mg total) by mouth once daily.  Start taking on:  6/5/2018     torsemide 20 MG Tab  Commonly known as:  DEMADEX  Take 3 tablets (60 mg total) by mouth once daily.        CHANGE how you take these medications    gabapentin 100 MG capsule  Commonly known as:  NEURONTIN  Take 3 capsules (300 mg total) by mouth every evening.  What changed:  how much to take     predniSONE 20 MG tablet  Commonly known as:  DELTASONE  Take daily by mouth daily: 60mg 6/4-6/10, 50mg (2.5 tab) 6/11-6/17, 40mg (2 tab) 6/18-6/24, 30mg (1.5 tab) 6/25-7/1, 20 mg 7/2/18  What changed:  additional instructions     sevelamer carbonate 800 mg Tab  Commonly known as:  RENVELA  Take 2 tablets (1,600 mg total) by mouth 3 (three) times daily with meals.  What changed:  how much to take        CONTINUE taking these medications    amLODIPine 5 MG tablet  Commonly known as:  NORVASC  Take 1 tablet (5 mg total) by mouth once daily.     bisacodyl 5 mg EC tablet  Commonly known as:  DULCOLAX  Take 2 tablets (10 mg total) by mouth once daily.     docusate sodium 100 MG capsule  Commonly known as:  COLACE  Take 1 capsule (100 mg total) by mouth 2 (two) times daily.     dronabinol 2.5 MG capsule  Commonly known as:  MARINOL  Take 1 capsule (2.5 mg total) by mouth every evening.     ergocalciferol 50,000 unit Cap  Commonly known as:  ERGOCALCIFEROL  Take 1 capsule (50,000 Units total) by mouth every 7 days.     famotidine 20 MG tablet  Commonly known as:  PEPCID  Take 1 tablet (20 mg total) by mouth every evening.     fluconazole 200 MG Tab  Commonly known as:  DIFLUCAN  Take 1 tablet (200 mg total) by mouth once daily. STOP 6/23/18     levETIRAcetam 500 MG Tab  Commonly known as:  KEPPRA  Take 1 tablet  (500 mg total) by mouth 2 (two) times daily.     levothyroxine 75 MCG tablet  Commonly known as:  SYNTHROID  Take 1 tablet (75 mcg total) by mouth before breakfast.     ondansetron 8 MG Tbdl  Commonly known as:  ZOFRAN-ODT  Take 1 tablet (8 mg total) by mouth every 8 (eight) hours as needed (nausea).     oxyCODONE 10 mg Tab immediate release tablet  Commonly known as:  ROXICODONE  Take 0.5-1 tablets (5-10 mg total) by mouth every 4 (four) hours as needed for Pain.     ursodiol 300 mg capsule  Commonly known as:  ACTIGALL  Take 1 capsule (300 mg total) by mouth 2 (two) times daily.        STOP taking these medications    cycloSPORINE modified (NEORAL) 100 MG capsule  Commonly known as:  NEORAL     cycloSPORINE modified (NEORAL) 25 MG capsule     metoclopramide HCl 10 MG tablet  Commonly known as:  REGLAN          Time spent caring for patient (Greater than 1/2 spent in direct face-to-face contact): > 30 minutes    Shereen Mcguire DNP  Liver Transplant  [facility]

## 2018-06-04 NOTE — PROGRESS NOTES
"Patient and mother tearful upon assessment this am, Shereen MCFARLAND NP notified.  Patient did not take ALL pm medications including pain medication.  Patients mother states that her son has been in so much pain and is so frustrated that she thinks he "will make bad decisions".  REBECCA Mcguire NP at bedside currently as well as psychiatry.  Patient given pain pill this am.  All am medications at bedside, educated on importance of immunosuppressant medications.  Will continue to monitor.  "

## 2018-06-04 NOTE — PROGRESS NOTES
"Patient took all am medications, patient stated "when are the Drs coming because I want to leave now?" Notified H Maynor NP of situation. Team is currently rounding.  Will continue to monitor.  "

## 2018-06-04 NOTE — ASSESSMENT & PLAN NOTE
Patient initially with waxing and waning orientation, decreased sleep and significant confusion and mood lability per RN and mother. Patient with BUN 84 5/29 and did subsequently complete HD session which may be responsible for improvement seen 5/30. Patient also on multiple deliriogenic medications including Keppra, Famotidine, Marinol, oxycodone and prednisone.  Mental status improved and delirium resolved 6/1.  - Recommend decreasing Risperdal 0.5 mg PO qHS tonight and then discontinuing.  If patient leaves against medical advice, would not discharge with Risperdal. QTc 429 on 5/27, 418 5/31.  - Recommend Ramelteon 8 mg PO qHS PRN insomnia to assist with sleep wake cycle  - May use Zyprexa 5 mg PO (Zydis disintegrating tablet)/IM q8 hours PRN nonredirectable agitation.   · DELIRIUM BEHAVIOR MANAGEMENT  · PLEASE utilize CHEMICAL restraints with PRN meds first for agitation. Minimize use of PHYSICAL restraints  · Keep window shades open and room lit during day and room dim at night in order to promote normal sleep-wake cycles  · Encourage family at bedside. Sharon patient often to situation, location, date.  · Continue to Limit or Discontinue use of Narcotics, Benzos and Anti-cholinergic medications as they may worsen delirium.  · Continue medical workup for causative etiology of Delirium. CTH negative, infectious work up thus far negative.    Capacity: the ability to accept or refuse treatment recommendations. Capacity is determined by a clinician upon specific elements of a mental status exam (does not have to be performed by a psychiatrist).     To demonstrate capacity, a patient must be able to cognitively utilize information provided to them:   Express a choice that is stable over time   Understand the relevant information   Appreciate the consequences of the decision (risks vs benefits)   Manipulate all of the data in a logical fashion    Patient has demonstrated decision making capacity at this time, but  will need further counseling from his primary team regarding potential risks of leaving the hospital AMA.

## 2018-06-04 NOTE — PROGRESS NOTES
Discharge Note:  SW met with pt and pt's mother Sanjuanita Diana ([phone]) in pt's room in order to discuss discharge plan. Pt AAOx4, but reporting that he is coping poorly due to continuous abdominal pain. Per LTS, pain is related to long-term narcotic use. Pt stating he does not feel that he needs to be in the hospital at this point if his pain is not being managed adequately. Pt's mother reports that pt had a pain management appointment set up at [facility] but that this was canceled as pt was still in the hospital. Pt's mother reports that she is setting up a future appointment later today. SW notified pt that he will need to continue HD and has a chair time scheduled for this afternoon at 4pm. SW contacted [facility] ([phone]) and pt is on the list and still has a chair available. Pt notified. Pt has a history of refusing HD while in the hospital during this stay and SW reiterated that team would like pt to get back on his regular HD schedule for M/F at 4pm. Pt expressed understanding but did appear frustrated that he would need to go to HD today. Pt inquiring about where he stands for kidney transplant eval. SW will speak with Dr. Rogers regarding this situation and notified pt that Dr. Rogers would follow up with the pt. Team notified pt this morning that he will need to enroll in substance abuse treatment. Pt was supposed to enroll in ABU months ago but was unable to enroll due to multiple medical issues. Pt and mother asking if pt can return to [place] and attend [facility] in [place] ([phone]). EZEKIEL spoke with Dr. Rogers who states that pt will need to stay locally for another two weeks while the Rapamune is adjusted. However, Dr. Rogers states that he is agreeable to pt attending IOP in [place] once pt cleared to return home. Pt's mother stating that remaining at  for another 6 weeks would be difficult as their resources are depleted. SW explained that if pt  attends [facility], he will need to make sure to sign a ALL with the facility allowing them to speak with [facility]. Pt stating he is amenable to this. Pt's mother reports that pt lives in  but could stay with his sister in [place] or mother could transport him to and from the program on a daily basis. Pt and mother aware that pt will also need to be transferred to a new dialysis unit and pt will need to make sure to incorporate HD into his IOP schedule. Pt and mother expressed understanding. Pt's mother reports that she is planning to transport pt back to the [facility] this afternoon. While pt not coping well due to pain, pt denying safety issues. SW remains available for continued psychosocial support, education, resources, and additional d/c planning as needed.
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
## 2018-06-04 NOTE — SUBJECTIVE & OBJECTIVE
"Interval History: Tearful and upset this am. "The same thing" referring to abdominal pain.  ml however patient reports making more than recording. K 5.4. Eating potatoes chips. Last BM yesterday.     Review of patient's allergies indicates:   Allergen Reactions    Bactrim [sulfamethoxazole-trimethoprim] Other (See Comments)     Mookie Trell Syndrome     Current Facility-Administered Medications   Medication Frequency    0.9%  NaCl infusion (for blood administration) Q24H PRN    0.9%  NaCl infusion Once    acetaminophen tablet 650 mg Q4H PRN    amLODIPine tablet 5 mg Daily    bisacodyl EC tablet 10 mg Daily    dextrose 50% injection 12.5 g PRN    diphenhydrAMINE injection 25 mg Q4H PRN    docusate sodium capsule 100 mg BID    dronabinol capsule 2.5 mg QHS    epoetin maurisio injection 20,000 Units Every Mon, Fri    famotidine tablet 20 mg QHS    fluconazole tablet 200 mg Daily    gabapentin capsule 300 mg QHS    gentamicin injection 80 mg PRN    glucagon (human recombinant) injection 1 mg PRN    glycerin 99.5% topical solution 100 mL Once    And    magnesium citrate solution 300 mL Once    And    sodium chloride 0.9% bolus 500 mL Once    levETIRAcetam tablet 500 mg BID    levothyroxine tablet 75 mcg Before breakfast    ondansetron disintegrating tablet 8 mg Q8H PRN    oxyCODONE immediate release tablet 5 mg Q4H PRN    polyethylene glycol packet 17 g BID    predniSONE tablet 60 mg Daily    ramelteon tablet 8 mg Nightly PRN    risperiDONE tablet 0.5 mg QHS    sevelamer carbonate tablet 1,600 mg TID WM    simethicone chewable tablet 80 mg TID PC    sirolimus tablet 1 mg Daily    sodium chloride 0.9% bolus 500 mL Once    sodium chloride 0.9% flush 3 mL PRN    ursodiol capsule 300 mg BID       Objective:     Vital Signs (Most Recent):  Temp: 98.5 °F (36.9 °C) (06/04/18 0857)  Pulse: 81 (06/04/18 0857)  Resp: 16 (06/04/18 0857)  BP: (!) 141/85 (06/04/18 0857)  SpO2: 98 % (06/04/18 " 0857)  O2 Device (Oxygen Therapy): room air (06/04/18 0331) Vital Signs (24h Range):  Temp:  [98 °F (36.7 °C)-98.7 °F (37.1 °C)] 98.5 °F (36.9 °C)  Pulse:  [81-86] 81  Resp:  [15-18] 16  SpO2:  [96 %-99 %] 98 %  BP: (138-152)/(82-88) 141/85     Weight: 65.2 kg (143 lb 11.8 oz) (05/27/18 2330)  Body mass index is 23.2 kg/m².  Body surface area is 1.74 meters squared.    I/O last 3 completed shifts:  In: 925 [P.O.:800; Blood:125]  Out: 730 [Urine:730]    Physical Exam   Constitutional: He is oriented to person, place, and time.   Chronic ill appearing    HENT:   Head: Atraumatic.   Eyes: EOM are normal.   Neck: Neck supple.   Cardiovascular: Normal rate and regular rhythm.    Pulmonary/Chest: Effort normal and breath sounds normal.   Diminished at bases.    Abdominal: Soft. Bowel sounds are normal. He exhibits distension. There is tenderness.   Musculoskeletal: He exhibits edema (BLE +2-3).   Neurological: He is oriented to person, place, and time.   Skin: Skin is warm and dry.   R IJ permacath   Psychiatric: He has a normal mood and affect.       Significant Labs:  All labs within the past 24 hours have been reviewed.     Significant Imaging:  Labs: Reviewed

## 2018-06-04 NOTE — SUBJECTIVE & OBJECTIVE
"Interval History: see hospital course     Family History     Problem Relation (Age of Onset)    No Known Problems Mother        Social History Main Topics    Smoking status: Former Smoker     Packs/day: 1.00     Years: 10.00     Types: Cigarettes     Start date: 2/19/2017    Smokeless tobacco: Never Used    Alcohol use No      Comment: a fifth of liquor daily for years, cut back over last 2 months    Drug use: No    Sexual activity: Not on file     Psychotherapeutics     Start     Stop Route Frequency Ordered    06/04/18 2100  risperiDONE tablet 0.5 mg      -- Oral Nightly 06/04/18 0810    05/27/18 2116  ramelteon tablet 8 mg      -- Oral Nightly PRN 05/27/18 2019           Review of Systems  Objective:     Vital Signs (Most Recent):  Temp: 98.5 °F (36.9 °C) (06/04/18 0857)  Pulse: 81 (06/04/18 0857)  Resp: 16 (06/04/18 0857)  BP: (!) 141/85 (06/04/18 0857)  SpO2: 98 % (06/04/18 0857) Vital Signs (24h Range):  Temp:  [98 °F (36.7 °C)-98.7 °F (37.1 °C)] 98.5 °F (36.9 °C)  Pulse:  [81-86] 81  Resp:  [15-18] 16  SpO2:  [96 %-99 %] 98 %  BP: (130-152)/(78-88) 141/85     Height: 5' 6" (167.6 cm)  Weight: 65.2 kg (143 lb 11.8 oz)  Body mass index is 23.2 kg/m².      Intake/Output Summary (Last 24 hours) at 06/04/18 0932  Last data filed at 06/04/18 0500   Gross per 24 hour   Intake              525 ml   Output              300 ml   Net              225 ml       Physical Exam      Mental Status Exam:  Appearance: age appropriate, casually dressed, lying in bed  Behavior/Cooperation:  reluctant to participate, eye contact normal  Speech: normal tone, normal rate, normal pitch, normal volume  Language: uses words appropriately; NO aphasia or dysarthria  Mood: irritable  Affect:  congruent with mood and appropriate to situation/content   Thought Process: normal and logical  Thought Content: normal, no suicidality, no homicidality, delusions, or paranoia  Level of Consciousness: Alert and Oriented x3  Memory:  Grossly " Intact  Attention/concentration: appropriate for age/education.   Fund of Knowledge: appears adequate  Insight: Intact  Judgment: Intact    Significant Labs:   Last 24 Hours:   Recent Lab Results       06/04/18  0437      Immature Granulocytes 1.2(H)     Immature Grans (Abs) 0.08  Comment:  Mild elevation in immature granulocytes is non specific and   can be seen in a variety of conditions including stress response,   acute inflammation, trauma and pregnancy. Correlation with other   laboratory and clinical findings is essential.  (H)     Albumin 3.0(L)     Alkaline Phosphatase 109     ALT 14     Anion Gap 12     Aniso Slight     AST 17     Baso # 0.01     Basophil% 0.1     Total Bilirubin 1.1  Comment:  For infants and newborns, interpretation of results should be based  on gestational age, weight and in agreement with clinical  observations.  Premature Infant recommended reference ranges:  Up to 24 hours.............<8.0 mg/dL  Up to 48 hours............<12.0 mg/dL  3-5 days..................<15.0 mg/dL  6-29 days.................<15.0 mg/dL  (H)     BUN, Bld 54(H)     [place] Cells Occasional     Calcium 9.0     Chloride 109     CO2 17(L)     Creatinine 2.9(H)     Differential Method Automated     eGFR if  32.5(A)     eGFR if non  28.1  Comment:  Calculation used to obtain the estimated glomerular filtration  rate (eGFR) is the CKD-EPI equation.   (A)     Eos # 0.0     Eosinophil% 0.0     Glucose 125(H)     Gran # (ANC) 5.7     Gran% 82.8(H)     Hematocrit 29.8(L)     Hemoglobin 9.7(L)     Hypo Occasional     Coumadin Monitoring INR 1.0  Comment:  Coumadin Therapy:  2.0 - 3.0 for INR for all indicators except mechanical heart valves  and antiphospholipid syndromes which should use 2.5 - 3.5.       Lymph # 0.7(L)     Lymph% 10.0(L)     Magnesium 2.6     MCH 32.0(H)     MCHC 32.6     MCV 98     Mono # 0.4     Mono% 5.9     MPV 10.1     nRBC 0     Phosphorus 3.9     Platelet Estimate  Decreased(A)     Platelets 26  Comment:  PLT COUNT   critical result(s) called and verbal readback obtained   from JUAN FLOWERS RN, 06/04/2018 07:43  (LL)     Poik Slight     Poly Occasional     Potassium 5.4  Comment:  *No Visible Hemolysis(H)     Total Protein 6.7     Protime 10.4     RBC 3.03(L)     RDW 17.0(H)     Sirolimus Lvl 2.7  Comment:  Sirolimus therapeutic range for Kidney Transplant: 4.0 - 15.0 ug/ml.  Testing performed by Liquid Chromatography-Tandem  Mass Spectrometry (LC-MS/MS).  This test was developed and its performance characteristics  determined by [facility], Department of Pathology  and Laboratory Medicine in a manner consistent with CLIA  requirements. It has not been cleared or approved by the [place]  Food and Drug Administration.  This test is used for clinical  purposes.  It should not be regarded as investigational or for  research.  (L)     Sodium 138     Tear Drop Cells Occasional     WBC 6.82           Significant Imaging: None

## 2018-06-04 NOTE — PROGRESS NOTES
"[facility]  Nephrology  Progress Note    Patient Name: Jhonny Diana  MRN: [id number]  Admission Date: 5/27/2018  Hospital Length of Stay: 8 days  Attending Provider: Nathanael Medina MD   Primary Care Physician: Primary Doctor No  Principal Problem:Abdominal pain    Subjective:     HPI: 28 y/o  male with significant history for ETOH cirrhosis sp  S/p DDLT 10/19/2017 complicated by  seizures (swtiched off prograf to cyclo)/superficial wound infection now resolved, ATN dialysis dependant in i[facility] MWF, multiple admissions for fevers finally dx with peritonitis in November/neg for bile leak, and recent admission 5/8-5/23 for fever, elevated bili, and anemia at which time seen by AES recommended no ERCP as imagining showed stent in place and Hematology recommended steroids for possible ITP/loweing dose of cyclosporine for possible TMA now readmitted for acute on chronic mid abdominal pain and constipation. Denies nausea or vomiting. Mother reports good appetite.  ml last Thursday but have not recorded since. KUB showed no obstruction, large stool through out colon, and common duct stent unchanged and pnumobilia present.  Nephrology consulted for hemodialysis. LAURA dialysis dependant on HD twice a week Mon and Fri via [facility] [place] [facility] the care of Dr. Peterson. Last HD Friday 5/25.     Interval History: Tearful and upset this am. "The same thing" referring to abdominal pain.  ml however patient reports making more than recording. K 5.4. Eating potatoes chips. Last BM yesterday.     Review of patient's allergies indicates:   Allergen Reactions    Bactrim [sulfamethoxazole-trimethoprim] Other (See Comments)     Mookie Trell Syndrome     Current Facility-Administered Medications   Medication Frequency    0.9%  NaCl infusion (for blood administration) Q24H PRN    0.9%  NaCl infusion Once    acetaminophen tablet 650 mg Q4H PRN    amLODIPine tablet 5 mg Daily    bisacodyl " EC tablet 10 mg Daily    dextrose 50% injection 12.5 g PRN    diphenhydrAMINE injection 25 mg Q4H PRN    docusate sodium capsule 100 mg BID    dronabinol capsule 2.5 mg QHS    epoetin maurisio injection 20,000 Units Every Mon, Fri    famotidine tablet 20 mg QHS    fluconazole tablet 200 mg Daily    gabapentin capsule 300 mg QHS    gentamicin injection 80 mg PRN    glucagon (human recombinant) injection 1 mg PRN    glycerin 99.5% topical solution 100 mL Once    And    magnesium citrate solution 300 mL Once    And    sodium chloride 0.9% bolus 500 mL Once    levETIRAcetam tablet 500 mg BID    levothyroxine tablet 75 mcg Before breakfast    ondansetron disintegrating tablet 8 mg Q8H PRN    oxyCODONE immediate release tablet 5 mg Q4H PRN    polyethylene glycol packet 17 g BID    predniSONE tablet 60 mg Daily    ramelteon tablet 8 mg Nightly PRN    risperiDONE tablet 0.5 mg QHS    sevelamer carbonate tablet 1,600 mg TID WM    simethicone chewable tablet 80 mg TID PC    sirolimus tablet 1 mg Daily    sodium chloride 0.9% bolus 500 mL Once    sodium chloride 0.9% flush 3 mL PRN    ursodiol capsule 300 mg BID       Objective:     Vital Signs (Most Recent):  Temp: 98.5 °F (36.9 °C) (06/04/18 0857)  Pulse: 81 (06/04/18 0857)  Resp: 16 (06/04/18 0857)  BP: (!) 141/85 (06/04/18 0857)  SpO2: 98 % (06/04/18 0857)  O2 Device (Oxygen Therapy): room air (06/04/18 0331) Vital Signs (24h Range):  Temp:  [98 °F (36.7 °C)-98.7 °F (37.1 °C)] 98.5 °F (36.9 °C)  Pulse:  [81-86] 81  Resp:  [15-18] 16  SpO2:  [96 %-99 %] 98 %  BP: (138-152)/(82-88) 141/85     Weight: 65.2 kg (143 lb 11.8 oz) (05/27/18 2330)  Body mass index is 23.2 kg/m².  Body surface area is 1.74 meters squared.    I/O last 3 completed shifts:  In: 925 [P.O.:800; Blood:125]  Out: 730 [Urine:730]    Physical Exam   Constitutional: He is oriented to person, place, and time.   Chronic ill appearing    HENT:   Head: Atraumatic.   Eyes: EOM are normal.    Neck: Neck supple.   Cardiovascular: Normal rate and regular rhythm.    Pulmonary/Chest: Effort normal and breath sounds normal.   Diminished at bases.    Abdominal: Soft. Bowel sounds are normal. He exhibits distension. There is tenderness.   Musculoskeletal: He exhibits edema (BLE +2-3).   Neurological: He is oriented to person, place, and time.   Skin: Skin is warm and dry.   R IJ permacath   Psychiatric: He has a normal mood and affect.       Significant Labs:  All labs within the past 24 hours have been reviewed.     Significant Imaging:  Labs: Reviewed    Assessment/Plan:     ESRD Monday/Friday    LAURA dialysis dependant on HD since 10/16/17 twice a week Mon and Fri 3.5 hrs duration  via R IJ Permacath at [facility] under the care of Dr. Peterson. Last HD Friday 5/25. Last recorded  ml last Thursday but feels decrease UOP since.   -Recommend torsemide 60 mg daily.   -DIscussed with patient avoid potato chips (bedside small bag of lays chip K 500 mg).  -Last HD 6/2  -Labs reviewed, K 5.4/Metabolic acidosis.   -Plan for HD for 6/3 and resume Mon/Fri. Dialysate will be adjusted to current labs    Anemia of CKD  -Hgb 9.7  -Resume Micerna per outpatient protocol.     BMD  Lab Results   Component Value Date    PTH 21.0 05/10/2018    CALCIUM 9.0 06/04/2018    CAION 1.07 03/07/2018    PHOS 3.9 06/04/2018   -Continue Renvela. Renal diet.                 Thank you for your consult. I will follow-up with patient. Please contact us if you have any additional questions.    Kay Guillen NP  Nephrology  [facility]

## 2018-06-04 NOTE — PROGRESS NOTES
Discharge Medication Note:    Hospital Course:  26 y/o M s/p Liver transplant on 10/19/17 secondary to EtOH admitted for abdominal pain and severe thrombocytopenia.  Patient's abdominal pain source is still being worked up, may be due to narcotic bowel.  Patient should be seen by hepatology or GI outpatient to start Movantik.  Patient switched to Rapamune from Cyclosporine during this hospitalization as CNI was thought to be the cause of his thrombocytopenia (TMA per hem/onc).  Patient also on a high prednisone taper 60 mg PO once daily with a weekly taper down by 10 mg until he is on 20 mg PO once daily.  Patient should take bowel regimen medications regularly to help with bowel function.     Met with Jhonny Diana at discharge to review discharge medications and to update the blue medication card.       Jhonny Diana   Home Medication Instructions NISHI:[id number]    Printed on:06/04/18 8578   Medication Information                      amLODIPine (NORVASC) 5 MG tablet  Take 1 tablet (5 mg total) by mouth once daily.             bisacodyl (DULCOLAX) 5 mg EC tablet  Take 2 tablets (10 mg total) by mouth once daily.             docusate sodium (COLACE) 100 MG capsule  Take 1 capsule (100 mg total) by mouth 2 (two) times daily.             dronabinol (MARINOL) 2.5 MG capsule  Take 1 capsule (2.5 mg total) by mouth every evening.             ergocalciferol (ERGOCALCIFEROL) 50,000 unit Cap  Take 1 capsule (50,000 Units total) by mouth every 7 days.             famotidine (PEPCID) 20 MG tablet  Take 1 tablet (20 mg total) by mouth every evening.             fluconazole (DIFLUCAN) 200 MG Tab  Take 1 tablet (200 mg total) by mouth once daily. STOP 6/23/18             gabapentin (NEURONTIN) 100 MG capsule  Take 3 capsules (300 mg total) by mouth every evening.             levETIRAcetam (KEPPRA) 500 MG Tab  Take 1 tablet (500 mg total) by mouth 2 (two) times daily.             levothyroxine (SYNTHROID) 75 MCG tablet  Take 1  tablet (75 mcg total) by mouth before breakfast.             ondansetron (ZOFRAN-ODT) 8 MG TbDL  Take 1 tablet (8 mg total) by mouth every 8 (eight) hours as needed (nausea).             oxyCODONE (ROXICODONE) 10 mg Tab immediate release tablet  Take 0.5-1 tablets (5-10 mg total) by mouth every 4 (four) hours as needed for Pain.             polyethylene glycol (GLYCOLAX) 17 gram PwPk  Mix 1 packet (17 g) in liquid and take by mouth 2 (two) times daily.             predniSONE (DELTASONE) 20 MG tablet  Take daily by mouth daily: 60mg 6/4-6/10, 50mg (2.5 tab) 6/11-6/17, 40mg (2 tab) 6/18-6/24, 30mg (1.5 tab) 6/25-7/1, 20 mg 7/2/18             sevelamer carbonate (RENVELA) 800 mg Tab  Take 2 tablets (1,600 mg total) by mouth 3 (three) times daily with meals.             sirolimus (RAPAMUNE) 1 MG Tab  Take 1 tablet (1 mg total) by mouth once daily.             torsemide (DEMADEX) 20 MG Tab  Take 3 tablets (60 mg total) by mouth once daily.             ursodiol (ACTIGALL) 300 mg capsule  Take 1 capsule (300 mg total) by mouth 2 (two) times daily.                 Pt was provided with prescriptions for the following meds:  E-rx: torsemide, gabapentin, sirolimus, miralax -- ORx  Printed rx: n/a  Phone-in or faxed rx: n/a to n/a pharmacy per pt request.    The following medications have been placed on HOLD and should be restarted in the outpatient setting (when appropriate): MMF on hold    Jhonny Diana verbalized understanding and had the opportunity to ask questions.

## 2018-06-05 ENCOUNTER — PATIENT OUTREACH (OUTPATIENT)
Dept: ADMINISTRATIVE | Facility: CLINIC | Age: 28
End: 2018-06-05

## 2018-06-05 LAB — FUNGUS SPEC CULT: NORMAL

## 2018-06-05 NOTE — PATIENT INSTRUCTIONS
Abdominal Pain    Abdominal pain is pain in the stomach or belly area. Everyone has this pain from time to time. In many cases it goes away on its own. But abdominal pain can sometimes be due to a serious problem, such as appendicitis. So its important to know when to seek help.  Causes of abdominal pain  There are many possible causes of abdominal pain. Common causes in adults include:  · Constipation, diarrhea, or gas  · Stomach acid flowing back up into the esophagus (acid reflux or heartburn)  · Severe acid reflux, called GERD (gastroesophageal reflux disease)  · A sore in the lining of the stomach or small intestine (peptic ulcer)  · Inflammation of the gallbladder, liver, or pancreas  · Gallstones or kidney stones  · Appendicitis   · Intestinal blockage   · An internal organ pushing through a muscle or other tissue (hernia)  · Urinary tract infections  · In women, menstrual cramps, fibroids, or endometriosis  · Inflammation or infection of the intestines  Diagnosing the cause of abdominal pain  Your healthcare provider will do a physical exam help find the cause of your pain. If needed, tests will be ordered. Belly pain has many possible causes. So it can be hard to find the reason for your pain. Giving details about your pain can help. Tell your provider where and when you feel the pain, and what makes it better or worse. Also let your provider know if you have other symptoms such as:  · Fever  · Tiredness  · Upset stomach (nausea)  · Vomiting  · Changes in bathroom habits  Treating abdominal pain  Some causes of pain need emergency medical treatment right away. These include appendicitis or a bowel blockage. Other problems can be treated with rest, fluids, or medicines. Your healthcare provider can give you specific instructions for treatment or self-care based on what is causing your pain.  If you have vomiting or diarrhea, sip water or other clear fluids. When you are ready to eat solid foods again,  start with small amounts of easy-to-digest, low-fat foods. These include apple sauce, toast, or crackers.   When to seek medical care  Call 911 or go to the hospital right away if you:  · Cant pass stool and are vomiting  · Are vomiting blood or have bloody diarrhea or black, tarry diarrhea  · Have chest, neck, or shoulder pain  · Feel like you might pass out  · Have pain in your shoulder blades with nausea  · Have sudden, severe belly pain  · Have new, severe pain unlike any you have felt before  · Have a belly that is rigid, hard, and tender to touch  Call your healthcare provider if you have:  · Pain for more than 5 days  · Bloating for more than 2 days  · Diarrhea for more than 5 days  · A fever of 100.4°F (38.0°C) or higher, or as directed by your provider  · Pain that gets worse  · Weight loss for no reason  · Continued lack of appetite  · Blood in your stool  How to prevent abdominal pain  Here are some tips to help prevent abdominal pain:  · Eat smaller amounts of food at one time.  · Avoid greasy, fried, or other high-fat foods.  · Avoid foods that give you gas.  · Exercise regularly.  · Drink plenty of fluids.  To help prevent GERD symptoms:  · Quit smoking.  · Reduce alcohol and certain foods that increase stomach acid.  · Avoid aspirin and over-the-counter pain and fever medicines (NSAIDS or nonsteroidal anti-inflammatory drugs), if possible  · Lose extra weight.  · Finish eating at least 2 hours before you go to bed or lie down.  · Raise the head of your bed.  Date Last Reviewed: 7/1/2016  © 2929-4050 [employer]. [address] [place] [address], [place] [address]. All rights reserved. This information is not intended as a substitute for professional medical care. Always follow your healthcare professional's instructions.

## 2018-06-07 ENCOUNTER — HOSPITAL ENCOUNTER (INPATIENT)
Facility: HOSPITAL | Age: 28
LOS: 9 days | Discharge: HOME | DRG: 896 | End: 2018-06-16
Attending: EMERGENCY MEDICINE | Admitting: EMERGENCY MEDICINE
Payer: COMMERCIAL

## 2018-06-07 ENCOUNTER — OFFICE VISIT (OUTPATIENT)
Dept: SPINE | Facility: CLINIC | Age: 28
End: 2018-06-07
Payer: COMMERCIAL

## 2018-06-07 VITALS
SYSTOLIC BLOOD PRESSURE: 140 MMHG | HEART RATE: 133 BPM | DIASTOLIC BLOOD PRESSURE: 92 MMHG | WEIGHT: 143 LBS | HEIGHT: 66 IN | BODY MASS INDEX: 22.98 KG/M2

## 2018-06-07 DIAGNOSIS — Z29.89 PROPHYLACTIC IMMUNOTHERAPY: ICD-10-CM

## 2018-06-07 DIAGNOSIS — N18.6 ESRD (END STAGE RENAL DISEASE): ICD-10-CM

## 2018-06-07 DIAGNOSIS — N19 RENAL FAILURE, UNSPECIFIED CHRONICITY: ICD-10-CM

## 2018-06-07 DIAGNOSIS — F10.21 ALCOHOL USE DISORDER, SEVERE, IN EARLY REMISSION: ICD-10-CM

## 2018-06-07 DIAGNOSIS — Z94.4 S/P LIVER TRANSPLANT: Primary | ICD-10-CM

## 2018-06-07 DIAGNOSIS — E43 SEVERE PROTEIN-CALORIE MALNUTRITION: ICD-10-CM

## 2018-06-07 DIAGNOSIS — Z79.60 LONG-TERM USE OF IMMUNOSUPPRESSANT MEDICATION: Chronic | ICD-10-CM

## 2018-06-07 DIAGNOSIS — R10.9 ABDOMINAL PAIN: Primary | ICD-10-CM

## 2018-06-07 DIAGNOSIS — K70.31 ASCITES DUE TO ALCOHOLIC CIRRHOSIS: ICD-10-CM

## 2018-06-07 DIAGNOSIS — R10.9 ABDOMINAL PAIN, UNSPECIFIED ABDOMINAL LOCATION: ICD-10-CM

## 2018-06-07 DIAGNOSIS — K83.1 BILIARY STRICTURE OF TRANSPLANTED LIVER: ICD-10-CM

## 2018-06-07 DIAGNOSIS — T86.49 BILIARY STRICTURE OF TRANSPLANTED LIVER: ICD-10-CM

## 2018-06-07 DIAGNOSIS — N17.0 ACUTE RENAL FAILURE WITH TUBULAR NECROSIS: ICD-10-CM

## 2018-06-07 DIAGNOSIS — D63.1 ANEMIA OF CHRONIC RENAL FAILURE, STAGE 5: ICD-10-CM

## 2018-06-07 DIAGNOSIS — Z29.89 PROPHYLACTIC IMMUNOTHERAPY: Chronic | ICD-10-CM

## 2018-06-07 DIAGNOSIS — N18.5 ANEMIA OF CHRONIC RENAL FAILURE, STAGE 5: ICD-10-CM

## 2018-06-07 DIAGNOSIS — Z94.4 LIVER TRANSPLANTED: ICD-10-CM

## 2018-06-07 DIAGNOSIS — Z94.4 S/P LIVER TRANSPLANT: Chronic | ICD-10-CM

## 2018-06-07 DIAGNOSIS — D69.6 THROMBOCYTOPENIA: ICD-10-CM

## 2018-06-07 DIAGNOSIS — D61.818 OTHER PANCYTOPENIA: ICD-10-CM

## 2018-06-07 LAB
ALBUMIN SERPL BCP-MCNC: 2.8 G/DL
ALP SERPL-CCNC: 135 U/L
ALT SERPL W/O P-5'-P-CCNC: 25 U/L
ANION GAP SERPL CALC-SCNC: 16 MMOL/L
AST SERPL-CCNC: 35 U/L
BASOPHILS # BLD AUTO: 0.01 K/UL
BASOPHILS NFR BLD: 0.1 %
BILIRUB SERPL-MCNC: 1.1 MG/DL
BILIRUB UR QL STRIP: NEGATIVE
BUN SERPL-MCNC: 64 MG/DL
CALCIUM SERPL-MCNC: 8.1 MG/DL
CHLORIDE SERPL-SCNC: 107 MMOL/L
CLARITY UR REFRACT.AUTO: CLEAR
CO2 SERPL-SCNC: 17 MMOL/L
COLOR UR AUTO: NORMAL
CREAT SERPL-MCNC: 3.2 MG/DL
DIFFERENTIAL METHOD: ABNORMAL
EOSINOPHIL # BLD AUTO: 0 K/UL
EOSINOPHIL NFR BLD: 0.2 %
ERYTHROCYTE [DISTWIDTH] IN BLOOD BY AUTOMATED COUNT: 16.7 %
EST. GFR  (AFRICAN AMERICAN): 28.9 ML/MIN/1.73 M^2
EST. GFR  (NON AFRICAN AMERICAN): 25 ML/MIN/1.73 M^2
GLUCOSE SERPL-MCNC: 173 MG/DL
GLUCOSE UR QL STRIP: NEGATIVE
HCT VFR BLD AUTO: 39 %
HGB BLD-MCNC: 12.8 G/DL
HGB UR QL STRIP: NEGATIVE
IMM GRANULOCYTES # BLD AUTO: 0.08 K/UL
IMM GRANULOCYTES NFR BLD AUTO: 0.8 %
INR PPP: 1
KETONES UR QL STRIP: NEGATIVE
LEUKOCYTE ESTERASE UR QL STRIP: NEGATIVE
LIPASE SERPL-CCNC: 42 U/L
LYMPHOCYTES # BLD AUTO: 0.5 K/UL
LYMPHOCYTES NFR BLD: 5.4 %
MAGNESIUM SERPL-MCNC: 2 MG/DL
MCH RBC QN AUTO: 32.2 PG
MCHC RBC AUTO-ENTMCNC: 32.8 G/DL
MCV RBC AUTO: 98 FL
MONOCYTES # BLD AUTO: 0.2 K/UL
MONOCYTES NFR BLD: 1.8 %
NEUTROPHILS # BLD AUTO: 9 K/UL
NEUTROPHILS NFR BLD: 91.7 %
NITRITE UR QL STRIP: NEGATIVE
NRBC BLD-RTO: 0 /100 WBC
PH UR STRIP: 6 [PH] (ref 5–8)
PHOSPHATE SERPL-MCNC: 5.8 MG/DL
PLATELET # BLD AUTO: 33 K/UL
PMV BLD AUTO: 12.7 FL
POTASSIUM SERPL-SCNC: 4.5 MMOL/L
PROT SERPL-MCNC: 6.4 G/DL
PROT UR QL STRIP: NEGATIVE
PROTHROMBIN TIME: 10.2 SEC
RBC # BLD AUTO: 3.97 M/UL
SODIUM SERPL-SCNC: 140 MMOL/L
SP GR UR STRIP: 1 (ref 1–1.03)
URN SPEC COLLECT METH UR: NORMAL
UROBILINOGEN UR STRIP-ACNC: NEGATIVE EU/DL
WBC # BLD AUTO: 9.78 K/UL

## 2018-06-07 PROCEDURE — 3008F BODY MASS INDEX DOCD: CPT | Mod: CPTII,S$GLB,, | Performed by: NURSE PRACTITIONER

## 2018-06-07 PROCEDURE — 96361 HYDRATE IV INFUSION ADD-ON: CPT | Mod: NTX

## 2018-06-07 PROCEDURE — 81003 URINALYSIS AUTO W/O SCOPE: CPT | Mod: NTX

## 2018-06-07 PROCEDURE — 99285 EMERGENCY DEPT VISIT HI MDM: CPT | Mod: ,,, | Performed by: PHYSICIAN ASSISTANT

## 2018-06-07 PROCEDURE — 80053 COMPREHEN METABOLIC PANEL: CPT | Mod: NTX

## 2018-06-07 PROCEDURE — 49082 ABD PARACENTESIS: CPT | Mod: NTX

## 2018-06-07 PROCEDURE — 63600175 PHARM REV CODE 636 W HCPCS: Mod: NTX | Performed by: PHYSICIAN ASSISTANT

## 2018-06-07 PROCEDURE — 25000003 PHARM REV CODE 250: Mod: NTX | Performed by: PHYSICIAN ASSISTANT

## 2018-06-07 PROCEDURE — 84100 ASSAY OF PHOSPHORUS: CPT | Mod: NTX

## 2018-06-07 PROCEDURE — 99285 EMERGENCY DEPT VISIT HI MDM: CPT | Mod: NTX

## 2018-06-07 PROCEDURE — 25500020 PHARM REV CODE 255: Mod: NTX | Performed by: EMERGENCY MEDICINE

## 2018-06-07 PROCEDURE — 99999 PR PBB SHADOW E&M-EST. PATIENT-LVL III: CPT | Mod: PBBFAC,,, | Performed by: NURSE PRACTITIONER

## 2018-06-07 PROCEDURE — 85025 COMPLETE CBC W/AUTO DIFF WBC: CPT | Mod: NTX

## 2018-06-07 PROCEDURE — 12000002 HC ACUTE/MED SURGE SEMI-PRIVATE ROOM: Mod: NTX

## 2018-06-07 PROCEDURE — 83690 ASSAY OF LIPASE: CPT | Mod: NTX

## 2018-06-07 PROCEDURE — 25000003 PHARM REV CODE 250: Mod: NTX | Performed by: STUDENT IN AN ORGANIZED HEALTH CARE EDUCATION/TRAINING PROGRAM

## 2018-06-07 PROCEDURE — 93005 ELECTROCARDIOGRAM TRACING: CPT | Mod: NTX

## 2018-06-07 PROCEDURE — 83735 ASSAY OF MAGNESIUM: CPT | Mod: NTX

## 2018-06-07 PROCEDURE — 96375 TX/PRO/DX INJ NEW DRUG ADDON: CPT | Mod: NTX

## 2018-06-07 PROCEDURE — 85610 PROTHROMBIN TIME: CPT | Mod: NTX

## 2018-06-07 PROCEDURE — 93010 ELECTROCARDIOGRAM REPORT: CPT | Mod: NTX,,, | Performed by: INTERNAL MEDICINE

## 2018-06-07 PROCEDURE — 99213 OFFICE O/P EST LOW 20 MIN: CPT | Mod: S$GLB,,, | Performed by: NURSE PRACTITIONER

## 2018-06-07 PROCEDURE — 63600175 PHARM REV CODE 636 W HCPCS: Mod: NTX | Performed by: EMERGENCY MEDICINE

## 2018-06-07 PROCEDURE — 25000003 PHARM REV CODE 250: Mod: NTX | Performed by: EMERGENCY MEDICINE

## 2018-06-07 PROCEDURE — 96374 THER/PROPH/DIAG INJ IV PUSH: CPT | Mod: NTX

## 2018-06-07 RX ORDER — GABAPENTIN 300 MG/1
600 CAPSULE ORAL NIGHTLY
Qty: 60 CAPSULE | Refills: 2 | Status: ON HOLD | OUTPATIENT
Start: 2018-06-07 | End: 2018-06-13

## 2018-06-07 RX ORDER — SODIUM CHLORIDE 0.9 % (FLUSH) 0.9 %
3 SYRINGE (ML) INJECTION
Status: DISCONTINUED | OUTPATIENT
Start: 2018-06-07 | End: 2018-06-08

## 2018-06-07 RX ORDER — MORPHINE SULFATE 4 MG/ML
6 INJECTION, SOLUTION INTRAMUSCULAR; INTRAVENOUS
Status: COMPLETED | OUTPATIENT
Start: 2018-06-07 | End: 2018-06-07

## 2018-06-07 RX ORDER — LEVOTHYROXINE SODIUM 75 UG/1
75 TABLET ORAL
Status: DISCONTINUED | OUTPATIENT
Start: 2018-06-08 | End: 2018-06-16 | Stop reason: HOSPADM

## 2018-06-07 RX ORDER — ONDANSETRON 4 MG/1
4 TABLET, ORALLY DISINTEGRATING ORAL
Status: COMPLETED | OUTPATIENT
Start: 2018-06-07 | End: 2018-06-07

## 2018-06-07 RX ORDER — DIPHENHYDRAMINE HYDROCHLORIDE 50 MG/ML
25 INJECTION INTRAMUSCULAR; INTRAVENOUS EVERY 4 HOURS PRN
Status: DISCONTINUED | OUTPATIENT
Start: 2018-06-07 | End: 2018-06-08

## 2018-06-07 RX ORDER — FLUCONAZOLE 200 MG/1
200 TABLET ORAL DAILY
Status: DISCONTINUED | OUTPATIENT
Start: 2018-06-08 | End: 2018-06-16 | Stop reason: HOSPADM

## 2018-06-07 RX ORDER — BISACODYL 5 MG
10 TABLET, DELAYED RELEASE (ENTERIC COATED) ORAL DAILY
Status: DISCONTINUED | OUTPATIENT
Start: 2018-06-08 | End: 2018-06-16 | Stop reason: HOSPADM

## 2018-06-07 RX ORDER — SIROLIMUS 1 MG/1
1 TABLET, FILM COATED ORAL DAILY
Status: DISCONTINUED | OUTPATIENT
Start: 2018-06-08 | End: 2018-06-16 | Stop reason: HOSPADM

## 2018-06-07 RX ORDER — POLYETHYLENE GLYCOL 3350 17 G/17G
17 POWDER, FOR SOLUTION ORAL 2 TIMES DAILY
Status: DISCONTINUED | OUTPATIENT
Start: 2018-06-08 | End: 2018-06-12

## 2018-06-07 RX ORDER — DRONABINOL 2.5 MG/1
2.5 CAPSULE ORAL NIGHTLY
Status: DISCONTINUED | OUTPATIENT
Start: 2018-06-08 | End: 2018-06-11

## 2018-06-07 RX ORDER — URSODIOL 300 MG/1
300 CAPSULE ORAL 2 TIMES DAILY
Status: DISCONTINUED | OUTPATIENT
Start: 2018-06-08 | End: 2018-06-16 | Stop reason: HOSPADM

## 2018-06-07 RX ORDER — PREDNISONE 10 MG/1
60 TABLET ORAL DAILY
Status: DISCONTINUED | OUTPATIENT
Start: 2018-06-08 | End: 2018-06-09

## 2018-06-07 RX ORDER — MORPHINE SULFATE 4 MG/ML
4 INJECTION, SOLUTION INTRAMUSCULAR; INTRAVENOUS
Status: DISCONTINUED | OUTPATIENT
Start: 2018-06-07 | End: 2018-06-07

## 2018-06-07 RX ORDER — TORSEMIDE 20 MG/1
60 TABLET ORAL DAILY
Status: DISCONTINUED | OUTPATIENT
Start: 2018-06-08 | End: 2018-06-16 | Stop reason: HOSPADM

## 2018-06-07 RX ORDER — RAMELTEON 8 MG/1
8 TABLET ORAL NIGHTLY PRN
Status: DISCONTINUED | OUTPATIENT
Start: 2018-06-07 | End: 2018-06-14

## 2018-06-07 RX ORDER — ACETAMINOPHEN 325 MG/1
650 TABLET ORAL EVERY 8 HOURS PRN
Status: DISCONTINUED | OUTPATIENT
Start: 2018-06-07 | End: 2018-06-16 | Stop reason: HOSPADM

## 2018-06-07 RX ORDER — GABAPENTIN 300 MG/1
600 CAPSULE ORAL NIGHTLY
Status: DISCONTINUED | OUTPATIENT
Start: 2018-06-08 | End: 2018-06-08

## 2018-06-07 RX ORDER — LEVETIRACETAM 500 MG/1
500 TABLET ORAL 2 TIMES DAILY
Status: DISCONTINUED | OUTPATIENT
Start: 2018-06-08 | End: 2018-06-16 | Stop reason: HOSPADM

## 2018-06-07 RX ORDER — SODIUM CHLORIDE 9 MG/ML
INJECTION, SOLUTION INTRAVENOUS CONTINUOUS
Status: DISCONTINUED | OUTPATIENT
Start: 2018-06-08 | End: 2018-06-08

## 2018-06-07 RX ORDER — HYDROMORPHONE HYDROCHLORIDE 1 MG/ML
1 INJECTION, SOLUTION INTRAMUSCULAR; INTRAVENOUS; SUBCUTANEOUS
Status: COMPLETED | OUTPATIENT
Start: 2018-06-07 | End: 2018-06-07

## 2018-06-07 RX ORDER — OXYCODONE HYDROCHLORIDE 5 MG/1
5 TABLET ORAL EVERY 4 HOURS PRN
Status: DISCONTINUED | OUTPATIENT
Start: 2018-06-07 | End: 2018-06-14

## 2018-06-07 RX ORDER — FAMOTIDINE 20 MG/1
20 TABLET, FILM COATED ORAL NIGHTLY
Status: DISCONTINUED | OUTPATIENT
Start: 2018-06-08 | End: 2018-06-16 | Stop reason: HOSPADM

## 2018-06-07 RX ORDER — AMLODIPINE BESYLATE 5 MG/1
5 TABLET ORAL DAILY
Status: DISCONTINUED | OUTPATIENT
Start: 2018-06-08 | End: 2018-06-16 | Stop reason: HOSPADM

## 2018-06-07 RX ORDER — HEPARIN SODIUM 5000 [USP'U]/ML
5000 INJECTION, SOLUTION INTRAVENOUS; SUBCUTANEOUS EVERY 8 HOURS
Status: DISCONTINUED | OUTPATIENT
Start: 2018-06-08 | End: 2018-06-12

## 2018-06-07 RX ORDER — ONDANSETRON 8 MG/1
8 TABLET, ORALLY DISINTEGRATING ORAL EVERY 8 HOURS PRN
Status: DISCONTINUED | OUTPATIENT
Start: 2018-06-07 | End: 2018-06-08

## 2018-06-07 RX ORDER — SEVELAMER CARBONATE 800 MG/1
1600 TABLET, FILM COATED ORAL
Status: DISCONTINUED | OUTPATIENT
Start: 2018-06-08 | End: 2018-06-16 | Stop reason: HOSPADM

## 2018-06-07 RX ORDER — DOCUSATE SODIUM 100 MG/1
100 CAPSULE, LIQUID FILLED ORAL 2 TIMES DAILY
Status: DISCONTINUED | OUTPATIENT
Start: 2018-06-08 | End: 2018-06-16 | Stop reason: HOSPADM

## 2018-06-07 RX ORDER — OXYCODONE HYDROCHLORIDE 5 MG/1
15 TABLET ORAL EVERY 4 HOURS PRN
Status: DISCONTINUED | OUTPATIENT
Start: 2018-06-07 | End: 2018-06-08

## 2018-06-07 RX ADMIN — OXYCODONE HYDROCHLORIDE 15 MG: 5 TABLET ORAL at 11:06

## 2018-06-07 RX ADMIN — SODIUM CHLORIDE 1000 ML: 0.9 INJECTION, SOLUTION INTRAVENOUS at 06:06

## 2018-06-07 RX ADMIN — HYDROMORPHONE HYDROCHLORIDE 1 MG: 1 INJECTION, SOLUTION INTRAMUSCULAR; INTRAVENOUS; SUBCUTANEOUS at 08:06

## 2018-06-07 RX ADMIN — SODIUM CHLORIDE 1000 ML: 0.9 INJECTION, SOLUTION INTRAVENOUS at 08:06

## 2018-06-07 RX ADMIN — IOHEXOL 7 ML: 350 INJECTION, SOLUTION INTRAVENOUS at 10:06

## 2018-06-07 RX ADMIN — ONDANSETRON 4 MG: 4 TABLET, ORALLY DISINTEGRATING ORAL at 06:06

## 2018-06-07 RX ADMIN — MORPHINE SULFATE 6 MG: 4 INJECTION INTRAVENOUS at 06:06

## 2018-06-07 NOTE — ED NOTES
Pt identifiers checked and accurate with Jhonny Diana     LOC: The patient is awake, alert and aware of environment with an appropriate affect, the patient is oriented x 3 and speaking appropriately.  APPEARANCE: Patient resting on stretcher, tearful, patient is clean and well groomed  SKIN: The skin is warm and dry, color consistent with ethnicity,skin intact, no breakdown or bruising noted.  MUSCULOSKELETAL: Patient moving all extremities well, no obvious swelling or deformities noted.   RESPIRATORY: Airway is open and patent, breath sounds clear throughout all lung fields; respirations are spontaneous, patient has a normal effort and rate, no accessory muscle use noted.   ABDOMEN: Soft and non tender to palpation, distention noted. Bowel sounds present x 4. Pt reports abdominal pain 10/10 x 1 month. Pt denies N/V/D  NEUROLOGIC: PERRL, 3mm bilaterally, eyes open spontaneously, behavior appropriate to situation, follows commands, facial expression symmetrical, bilateral hand grasp equal and even, purposeful motor response noted, normal sensation in all extremities when touched with a finger.

## 2018-06-07 NOTE — ED TRIAGE NOTES
Pt had a liver transplant in October 2017. Pt reports he hasn't had his abdomen tapped since then. Pt reports he has been having abdominal pain and swelling for a couple of months. PT reports he cant take the pain anymore and that is why he came to the ED today. Pt reports the pain radiates from the abdomen to back.

## 2018-06-07 NOTE — ED PROVIDER NOTES
Encounter Date: 6/7/2018    SCRIBE #1 NOTE: I, Allison Dillard, am scribing for, and in the presence of,  Dr. Green. I have scribed the following portions of the note - the EKG reading.       History     Chief Complaint   Patient presents with    Abdominal Pain     with distention, liver transplant 10/2018.      27-year-old white male with PMHx of hepatitis s/p liver transplant in October 2017, ESRD on HD MF (last dialysis on Monday), HTN presents to the ED c/o abdominal pain. He was admitted 5/27-6/4 for abdominal pain. He states the pain has been worsening since he was discharged from the hospital.  He has been out of oxycodone x 1 week. He was seen in pain management today and his gabapentin was increased. He reports some palpitations when the pain increases. He endorses worsening abdominal distention - he receives PRN paracentesis but has not had one in a while. He has been having normal BM, last BM this morning. He denies f/c, chest pain, SOB, dysuria, headache.       The history is provided by the patient.     Review of patient's allergies indicates:   Allergen Reactions    Bactrim [sulfamethoxazole-trimethoprim] Other (See Comments)     Mookie Trell Syndrome     Past Medical History:   Diagnosis Date    Alcoholic hepatitis with ascites     Alcoholic hepatitis with ascites     ESRD on dialysis     History of hematemesis 9/28/2017    Hypertension     Renal disorder     Seizures      Past Surgical History:   Procedure Laterality Date    APPENDECTOMY      ERCP      ESOPHAGOGASTRODUODENOSCOPY      LIVER TRANSPLANT       Family History   Problem Relation Age of Onset    No Known Problems Mother      Social History   Substance Use Topics    Smoking status: Former Smoker     Packs/day: 1.00     Years: 10.00     Types: Cigarettes     Start date: 2/19/2017    Smokeless tobacco: Never Used    Alcohol use No      Comment: a fifth of liquor daily for years, cut back over last 2 months     Review of Systems    Constitutional: Negative for chills and fever.   HENT: Negative for congestion, rhinorrhea and sore throat.    Eyes: Negative for photophobia and visual disturbance.   Respiratory: Negative for cough and shortness of breath.    Cardiovascular: Positive for palpitations. Negative for chest pain and leg swelling.   Gastrointestinal: Positive for abdominal distention, abdominal pain and nausea. Negative for blood in stool, constipation, diarrhea and vomiting.   Genitourinary: Negative for dysuria and hematuria.   Musculoskeletal: Negative for neck pain and neck stiffness.   Skin: Negative for rash and wound.   Neurological: Negative for syncope, weakness, light-headedness, numbness and headaches.   Psychiatric/Behavioral: Negative for confusion.       Physical Exam     Initial Vitals [06/07/18 1746]   BP Pulse Resp Temp SpO2   (!) 150/85 (!) 131 20 98.6 °F (37 °C) 97 %      MAP       106.67         Physical Exam    Nursing note and vitals reviewed.  Constitutional: He appears well-developed. He is not diaphoretic. He appears distressed.   Thin, chronically ill appearing   HENT:   Head: Normocephalic and atraumatic.   Neck: Normal range of motion. Neck supple.   Cardiovascular: Regular rhythm and normal heart sounds. Tachycardia present.  Exam reveals no gallop and no friction rub.    No murmur heard.  No LE edema   Pulmonary/Chest: He has no wheezes. He has no rhonchi. He has no rales.   Decreased breath sounds in R lower lung   Abdominal: Soft. Bowel sounds are normal. He exhibits distension. There is tenderness (generalized). There is no rebound and no guarding.   Musculoskeletal: Normal range of motion.   Neurological: He is alert and oriented to person, place, and time.   Skin: Skin is warm and dry. No rash noted. No erythema.   Psychiatric: He has a normal mood and affect.         ED Course   Procedures  Labs Reviewed   CBC W/ AUTO DIFFERENTIAL - Abnormal; Notable for the following:        Result Value    RBC  3.97 (*)     Hemoglobin 12.8 (*)     Hematocrit 39.0 (*)     MCH 32.2 (*)     RDW 16.7 (*)     Platelets 33 (*)     Immature Granulocytes 0.8 (*)     Gran # (ANC) 9.0 (*)     Immature Grans (Abs) 0.08 (*)     Lymph # 0.5 (*)     Mono # 0.2 (*)     Gran% 91.7 (*)     Lymph% 5.4 (*)     Mono% 1.8 (*)     All other components within normal limits    Narrative:       PLTS critical result(s) called and verbal readback obtained from   Edith Valladares RN, 06/07/2018 19:39   PHOSPHORUS - Abnormal; Notable for the following:     Phosphorus 5.8 (*)     All other components within normal limits   COMPREHENSIVE METABOLIC PANEL - Abnormal; Notable for the following:     CO2 17 (*)     Glucose 173 (*)     BUN, Bld 64 (*)     Creatinine 3.2 (*)     Calcium 8.1 (*)     Albumin 2.8 (*)     Total Bilirubin 1.1 (*)     eGFR if  28.9 (*)     eGFR if non  25.0 (*)     All other components within normal limits   COMPREHENSIVE METABOLIC PANEL - Abnormal; Notable for the following:     CO2 18 (*)     BUN, Bld 64 (*)     Creatinine 3.1 (*)     Calcium 7.9 (*)     Total Protein 5.6 (*)     Albumin 2.6 (*)     eGFR if  30.0 (*)     eGFR if non  26.0 (*)     All other components within normal limits   PHOSPHORUS - Abnormal; Notable for the following:     Phosphorus 6.1 (*)     All other components within normal limits   CBC W/ AUTO DIFFERENTIAL - Abnormal; Notable for the following:     RBC 2.80 (*)     Hemoglobin 9.0 (*)     Hematocrit 27.8 (*)     MCV 99 (*)     MCH 32.1 (*)     RDW 16.1 (*)     Platelets 26 (*)     Immature Granulocytes 1.2 (*)     Immature Grans (Abs) 0.07 (*)     Lymph # 0.6 (*)     Gran% 80.6 (*)     Lymph% 10.4 (*)     All other components within normal limits    Narrative:        PLT critical result(s) called and verbal readback obtained from   ERASMO MASON RN, 06/08/2018 06:41   CULTURE, BLOOD   CULTURE, BLOOD   PROTIME-INR   URINALYSIS, REFLEX TO URINE  CULTURE    Narrative:     Preferred Collection Type->Urine, Clean Catch   MAGNESIUM   LIPASE   MAGNESIUM   PROTIME-INR   SIROLIMUS LEVEL     EKG Readings: (Independently Interpreted)   Sinus tachycardia. Left axis deviation. No acute ischemia.        CT Abdomen Pelvis  Without Contrast   Final Result      Large volume ascites, diffuse body wall anasarca, changes orthotopic liver transplantation, and biliary stenting with expected intrahepatic pneumobilia.      Moderate right pleural effusion with associated compressive atelectasis.      Slight bladder wall thickening which may relate to ascites, chronic outlet obstruction, or cystitis.      Other incidental findings including dialysis catheter with tip in the SVC RA junction, splenomegaly, and stable small pericardial effusion.      Electronically signed by resident: Rajesh Torres   Date:    06/08/2018   Time:    00:01      Electronically signed by: Bong Raymond MD   Date:    06/08/2018   Time:    00:34      X-Ray Chest PA And Lateral   Final Result      #1. As above.         Electronically signed by: Karolina Varner MD   Date:    06/07/2018   Time:    19:36      X-Ray Abdomen Flat And Erect   Final Result      Nonobstructed bowel-gas pattern.      Cholecystectomy clips right upper quadrant.  Common duct stent with pneumobilia similar to June 2, 2018.         Electronically signed by: Bong Raymond MD   Date:    06/07/2018   Time:    19:26           Medical Decision Making:   History:   Old Medical Records: I decided to obtain old medical records.  Clinical Tests:   Lab Tests: Reviewed and Ordered  Radiological Study: Ordered and Reviewed  Medical Tests: Ordered and Reviewed  Other:   I have discussed this case with another health care provider.       APC / Resident Notes:   27-year-old white male with PMHx of hepatitis s/p liver transplant in October 2017, ESRD on HD MF (last dialysis on Monday), HTN presents to the ED c/o abdominal pain. Tachycardic - possibly  pain response. Regular rhythm. Lungs CTA. Abdomen distended but soft. No LE edema. Thin and chronically ill appearing. DDx includes but is not limited to liver failure, chronic abdominal pain, UTI, pneumonia, SBO. Will get labs, xray abdomen, CXR. Will discuss with liver transplant surgery.    No leukocytosis. Anemia noted. PT/INR normal. UA with no infection.    Xray abdomen with no obstruction. CXR shows R pleural effusion.     Liver transplant was consulted and evaluated the patient in the ED. Will admit to their service for further management.         Attending Attestation:     Physician Attestation Statement for NP/PA:   I discussed this assessment and plan of this patient with the NP/PA, but I did not personally examine the patient. The face to face encounter was performed by the NP/PA.                     Clinical Impression:   The primary encounter diagnosis was Abdominal pain. Diagnoses of Liver transplanted and Renal failure, unspecified chronicity were also pertinent to this visit.      Disposition:   Disposition: Admitted  Condition: Fair  Liver transplant surgery                        Isabelle Blackburn PA-C  06/08/18 0806

## 2018-06-07 NOTE — PROGRESS NOTES
Chronic Pain - New Consult    Referring Physician: No ref. provider found    Chief Complaint:   Chief Complaint   Patient presents with    Follow-up        SUBJECTIVE: Disclaimer: This note has been generated using voice-recognition software. There may be typographical errors that have been missed during proof-reading    Interval History 6/7/2018:  The patient presents for follow up of chronic abdominal pain.  Since his previous encounter, he was admitted for abdominal pain and was discharged earlier this week.  He was started on Gabapentin in the hospital at 300 mg QHS.  He is reporting no side effects and no benefit.  He also did have recent ERCP and liver biopsy.  He is scheduled to follow up with his hepatologist next week.  His pain today is 8/10.    Initial encounter:    Jhonny Diana  26 yo male with PMHx of OLT with complicated course including renal failure (now dialyyzes x2 times per week), malnutrition, multiple paracentesis, issues with recurrent infections and abscess drainages presents to the clinic for the evaluation of abdominal pain. The pain started 8 months ago following status post liver transplant and symptoms have been worsening.  He is s/p OLT transplant in October of 2017 and reports ongoing abdominal pain since this.  The onsent was following his surgery.  The pain is located bilaterally below the umbilicus and radiates around his flanks.  The pain is described as aching and stabbing.  Alleviating factors are pain medication, exacerbating factors are activity.       He has history of anastomotic stricture/sludge, and multiple stent placements with the last in March of this year.  He was started on prophylactic antibiotic therapy 04.23.18.    Brief history:    Pain Description:    The pain is located in the abdominal area and radiates to the both hips and middle to lower back.      At BEST  4/10     At WORST  10/10 on the WORST day.      On average pain is rated as 7/10.     Today the pain  is rated as 6/10    The pain is described as aching, pulsating, shooting, stabbing and throbbing      Symptoms interfere with daily activity and sleeping.     Exacerbating factors: Standing, Bending, Touching, Coughing/Sneezing, Flexing, Lifting and Getting out of bed/chair.      Mitigating factors heat, laying down, medications, physical therapy, rest and sitting.     Patient denies night fever/night sweats, urinary incontinence, bowel incontinence, significant weight loss, significant motor weakness and loss of sensations.  Patient denies any suicidal or homicidal ideations    Pain Medications:   Current:  Gabapentin 600 mg QHS    Tried in Past:  NSAIDs -Never  TCA -Never  SNRI -Never  Anti-convulsants - Gabapentin  Muscle Relaxants -Never  Opioids- oxycodone    Physical Therapy/Home Exercise: yes       report:  Reviewed and consistent with medication use as prescribed.    Pain Procedures:   None    Chiropractor -never  Acupuncture - never  TENS unit -never  Spinal decompression -never  Joint replacement -never    Imaging:     Impression CT Abdomen and Pelvis      Postsurgical changes status post liver transplantation without interval detrimental change.  Large volume of ascites, splenomegaly, and significant anasarca remains stable.    Stable, large volume right-sided pleural effusion with associated compressive atelectasis of the adjacent lung.  There has been interval resolution of a left-sided, small pleural effusion.    Stable, small pericardial effusion.    Biliary stent in place.    Further findings as above.    Electronically signed by resident: Prosper Valerio  Date: 04/23/2018  Time: 09:05    Electronically signed by: William Liz MD  Date: 04/23/2018  Time: 09:49       Impression US Liver Transplant 4.10.18      Satisfactory Doppler ultrasound of the transplant liver.    Slight interval enlargement of a complex fluid collection adjacent to the left hepatic lobe.    Biliary stent remains in  place.    Small volume ascites and right pleural effusion.    Electronically signed by resident: Rl Saini  Date: 04/10/2018  Time: 14:12    Electronically signed by: Rajesh Cai MD  Date: 04/10/2018  Time: 14:29         Past Medical History:   Diagnosis Date    Alcoholic hepatitis with ascites     Alcoholic hepatitis with ascites     ESRD on dialysis     History of hematemesis 9/28/2017    Hypertension     Renal disorder     Seizures      Past Surgical History:   Procedure Laterality Date    APPENDECTOMY      ERCP      ESOPHAGOGASTRODUODENOSCOPY      LIVER TRANSPLANT       Social History     Social History    Marital status: Single     Spouse name: N/A    Number of children: N/A    Years of education: N/A     Occupational History    Not on file.     Social History Main Topics    Smoking status: Former Smoker     Packs/day: 1.00     Years: 10.00     Types: Cigarettes     Start date: 2/19/2017    Smokeless tobacco: Never Used    Alcohol use No      Comment: a fifth of liquor daily for years, cut back over last 2 months    Drug use: No    Sexual activity: Not on file     Other Topics Concern    Not on file     Social History Narrative    No narrative on file     Family History   Problem Relation Age of Onset    No Known Problems Mother        Review of patient's allergies indicates:   Allergen Reactions    Bactrim [sulfamethoxazole-trimethoprim] Other (See Comments)     Mookie Trell Syndrome       Current Outpatient Prescriptions   Medication Sig    amLODIPine (NORVASC) 5 MG tablet Take 1 tablet (5 mg total) by mouth once daily.    bisacodyl (DULCOLAX) 5 mg EC tablet Take 2 tablets (10 mg total) by mouth once daily.    docusate sodium (COLACE) 100 MG capsule Take 1 capsule (100 mg total) by mouth 2 (two) times daily.    dronabinol (MARINOL) 2.5 MG capsule Take 1 capsule (2.5 mg total) by mouth every evening.    ergocalciferol (ERGOCALCIFEROL) 50,000 unit Cap Take 1 capsule  (50,000 Units total) by mouth every 7 days.    famotidine (PEPCID) 20 MG tablet Take 1 tablet (20 mg total) by mouth every evening.    fluconazole (DIFLUCAN) 200 MG Tab Take 1 tablet (200 mg total) by mouth once daily. STOP 6/23/18    gabapentin (NEURONTIN) 100 MG capsule Take 3 capsules (300 mg total) by mouth every evening.    levETIRAcetam (KEPPRA) 500 MG Tab Take 1 tablet (500 mg total) by mouth 2 (two) times daily.    levothyroxine (SYNTHROID) 75 MCG tablet Take 1 tablet (75 mcg total) by mouth before breakfast.    ondansetron (ZOFRAN-ODT) 8 MG TbDL Take 1 tablet (8 mg total) by mouth every 8 (eight) hours as needed (nausea).    polyethylene glycol (GLYCOLAX) 17 gram PwPk Mix 1 packet (17 g) in liquid and take by mouth 2 (two) times daily.    predniSONE (DELTASONE) 20 MG tablet Take daily by mouth daily: 60mg 6/4-6/10, 50mg (2.5 tab) 6/11-6/17, 40mg (2 tab) 6/18-6/24, 30mg (1.5 tab) 6/25-7/1, 20 mg 7/2/18    sevelamer carbonate (RENVELA) 800 mg Tab Take 2 tablets (1,600 mg total) by mouth 3 (three) times daily with meals.    sirolimus (RAPAMUNE) 1 MG Tab Take 1 tablet (1 mg total) by mouth once daily.    torsemide (DEMADEX) 20 MG Tab Take 3 tablets (60 mg total) by mouth once daily.    ursodiol (ACTIGALL) 300 mg capsule Take 1 capsule (300 mg total) by mouth 2 (two) times daily.    oxyCODONE (ROXICODONE) 10 mg Tab immediate release tablet Take 0.5-1 tablets (5-10 mg total) by mouth every 4 (four) hours as needed for Pain.     No current facility-administered medications for this visit.        REVIEW OF SYSTEMS:    GENERAL:  No weight loss, malaise or fevers.  HEENT:   No recent changes in vision or hearing  NECK:  Negative for lumps, no difficulty with swallowing.  RESPIRATORY:  Negative for cough, wheezing or shortness of breath, patient denies any recent URI.  CARDIOVASCULAR:  Negative for chest pain, leg swelling or palpitations.  GI:  Positive for abdominal discomfort (T10-12 distrubution  "bilaterally), blood in stools or black stools or change in bowel habits.  MUSCULOSKELETAL:  Lower back pain  SKIN:  Negative for lesions, rash, and itching.  PSYCH:  No mood disorder or recent psychosocial stressors.  Patients sleep is not disturbed secondary to pain.  HEMATOLOGY/LYMPHOLOGY:  Negative for prolonged bleeding, bruising easily or swollen nodes.  Patient is not currently taking any anti-coagulants  ENDO: No history of diabetes or thyroid dysfunction  NEURO:   No history of headaches, syncope, paralysis, seizures or tremors.  All other reviewed and negative other than HPI.    OBJECTIVE:    BP (!) 140/92   Pulse (!) 133   Ht 5' 6" (1.676 m)   Wt 64.9 kg (143 lb)   BMI 23.08 kg/m²     PHYSICAL EXAMINATION:    GENERAL: Well appearing, in no acute distress, alert and oriented x3.  PSYCH:  Mood and affect appropriate.  SKIN: Skin color, texture, turgor normal, no rashes or lesions.  HEAD/FACE:  Normocephalic, atraumatic. Cranial nerves grossly intact.  NECK: No pain to palpation over the cervical paraspinous muscles. Spurling Negative. No pain with neck flexion, extension, or lateral flexion.   CV: RRR with palpation of the radial artery.  PULM: No evidence of respiratory difficulty, symmetric chest rise.  GI:  Protuberant, Soft and non-tender.    BACK: Straight leg raising in the supine position is negative to radicular pain. No pain to palpation over the facet joints of the lumbar spine or spinous processes. Normal range of motion without pain reproduction.  EXTREMITIES: Peripheral joint ROM is full and pain free without obvious instability or laxity in all four extremities. No deformities, edema, or skin discoloration. Good capillary refill.  MUSCULOSKELETAL: Shoulder, hip, and knee provocative maneuvers are negative.  There is no pain with palpation over the sacroiliac joints bilaterally.  FABERs test is negative.  FADIRs test is negative.   Bilateral upper and lower extremity strength is normal and " symmetric.  No atrophy or tone abnormalities are noted.  NEURO: Bilateral upper and lower extremity coordination and muscle stretch reflexes are physiologic and symmetric.  Plantar response are downgoing. No clonus.  No loss of sensation is noted.  GAIT: normal.    Lab Results   Component Value Date    WBC 6.82 06/04/2018    HGB 9.7 (L) 06/04/2018    HCT 29.8 (L) 06/04/2018    MCV 98 06/04/2018    PLT 26 (LL) 06/04/2018     BMP  Lab Results   Component Value Date     06/04/2018    K 5.4 (H) 06/04/2018     06/04/2018    CO2 17 (L) 06/04/2018    BUN 54 (H) 06/04/2018    CREATININE 2.9 (H) 06/04/2018    CALCIUM 9.0 06/04/2018    ANIONGAP 12 06/04/2018    ESTGFRAFRICA 32.5 (A) 06/04/2018    EGFRNONAA 28.1 (A) 06/04/2018       ASSESSMENT: 27 y.o. year old male with abdominal pain, consistent with biliary stricture/sludge    Encounter Diagnoses   Name Primary?    S/P liver transplant Yes    Prophylactic immunotherapy     Acute renal failure with tubular necrosis     Biliary stricture of transplanted liver        PLAN:     - Recent hospital notes reviewed today.    - Can increase Gabapentin to 600 mg daily.  I will send a refill today.    - We discussed previous recommendations of avoiding high doses of opioid medications due to history of alcohol abuse, liver disease and abdominal discomfort.      - Unfortunately, there are not any interventional procedures that we can safely perform for pain at this time given medical status and labwork.    - I did discuss this case with Dr. Vitale today as well.    - RTC PRN.      The above plan and management options were discussed at length with patient. Patient is in agreement with the above and verbalized understanding.     Jacquelyn Gant    06/07/2018

## 2018-06-08 PROBLEM — K70.31 ASCITES DUE TO ALCOHOLIC CIRRHOSIS: Status: ACTIVE | Noted: 2017-11-14

## 2018-06-08 LAB
ALBUMIN FLD-MCNC: 0.8 G/DL
ALBUMIN SERPL BCP-MCNC: 2.6 G/DL
ALP SERPL-CCNC: 105 U/L
ALT SERPL W/O P-5'-P-CCNC: 21 U/L
ANION GAP SERPL CALC-SCNC: 12 MMOL/L
APPEARANCE FLD: CLEAR
AST SERPL-CCNC: 22 U/L
BASOPHILS # BLD AUTO: 0 K/UL
BASOPHILS NFR BLD: 0 %
BILIRUB FLD-MCNC: 0.4 MG/DL
BILIRUB SERPL-MCNC: 1 MG/DL
BODY FLD TYPE: NORMAL
BODY FLUID SOURCE, BILIRUBIN: NORMAL
BUN SERPL-MCNC: 64 MG/DL
CALCIUM SERPL-MCNC: 7.9 MG/DL
CHLORIDE SERPL-SCNC: 109 MMOL/L
CO2 SERPL-SCNC: 18 MMOL/L
COLOR FLD: YELLOW
CREAT SERPL-MCNC: 3.1 MG/DL
DIFFERENTIAL METHOD: ABNORMAL
EOSINOPHIL # BLD AUTO: 0 K/UL
EOSINOPHIL NFR BLD: 0.5 %
ERYTHROCYTE [DISTWIDTH] IN BLOOD BY AUTOMATED COUNT: 16.1 %
EST. GFR  (AFRICAN AMERICAN): 30 ML/MIN/1.73 M^2
EST. GFR  (NON AFRICAN AMERICAN): 26 ML/MIN/1.73 M^2
GLUCOSE SERPL-MCNC: 95 MG/DL
HCT VFR BLD AUTO: 27.8 %
HGB BLD-MCNC: 9 G/DL
IMM GRANULOCYTES # BLD AUTO: 0.07 K/UL
IMM GRANULOCYTES NFR BLD AUTO: 1.2 %
INR PPP: 1
LYMPHOCYTES # BLD AUTO: 0.6 K/UL
LYMPHOCYTES NFR BLD: 10.4 %
LYMPHOCYTES NFR FLD MANUAL: 28 %
MAGNESIUM SERPL-MCNC: 1.7 MG/DL
MCH RBC QN AUTO: 32.1 PG
MCHC RBC AUTO-ENTMCNC: 32.4 G/DL
MCV RBC AUTO: 99 FL
MONOCYTES # BLD AUTO: 0.4 K/UL
MONOCYTES NFR BLD: 7.3 %
MONOS+MACROS NFR FLD MANUAL: 71 %
NEUTROPHILS # BLD AUTO: 4.6 K/UL
NEUTROPHILS NFR BLD: 80.6 %
NEUTROPHILS NFR FLD MANUAL: 1 %
NRBC BLD-RTO: 0 /100 WBC
PHOSPHATE SERPL-MCNC: 6.1 MG/DL
PLATELET # BLD AUTO: 26 K/UL
PMV BLD AUTO: 12.1 FL
POTASSIUM SERPL-SCNC: 4.7 MMOL/L
PROT SERPL-MCNC: 5.6 G/DL
PROTHROMBIN TIME: 10.9 SEC
RBC # BLD AUTO: 2.8 M/UL
SIROLIMUS BLD-MCNC: 4.8 NG/ML
SODIUM SERPL-SCNC: 139 MMOL/L
SPECIMEN SOURCE: NORMAL
WBC # BLD AUTO: 5.65 K/UL
WBC # FLD: 12 /CU MM

## 2018-06-08 PROCEDURE — 20600001 HC STEP DOWN PRIVATE ROOM: Mod: NTX

## 2018-06-08 PROCEDURE — 80195 ASSAY OF SIROLIMUS: CPT | Mod: NTX

## 2018-06-08 PROCEDURE — 87102 FUNGUS ISOLATION CULTURE: CPT | Mod: NTX

## 2018-06-08 PROCEDURE — 25000003 PHARM REV CODE 250: Mod: NTX | Performed by: NURSE PRACTITIONER

## 2018-06-08 PROCEDURE — 87070 CULTURE OTHR SPECIMN AEROBIC: CPT | Mod: NTX

## 2018-06-08 PROCEDURE — 83735 ASSAY OF MAGNESIUM: CPT | Mod: NTX

## 2018-06-08 PROCEDURE — 89051 BODY FLUID CELL COUNT: CPT | Mod: NTX

## 2018-06-08 PROCEDURE — 87205 SMEAR GRAM STAIN: CPT | Mod: NTX

## 2018-06-08 PROCEDURE — 25000003 PHARM REV CODE 250: Mod: NTX | Performed by: SURGERY

## 2018-06-08 PROCEDURE — 99223 1ST HOSP IP/OBS HIGH 75: CPT | Mod: NTX,,, | Performed by: NURSE PRACTITIONER

## 2018-06-08 PROCEDURE — 82042 OTHER SOURCE ALBUMIN QUAN EA: CPT | Mod: NTX

## 2018-06-08 PROCEDURE — 25000003 PHARM REV CODE 250: Mod: NTX | Performed by: STUDENT IN AN ORGANIZED HEALTH CARE EDUCATION/TRAINING PROGRAM

## 2018-06-08 PROCEDURE — 85610 PROTHROMBIN TIME: CPT | Mod: NTX

## 2018-06-08 PROCEDURE — 85025 COMPLETE CBC W/AUTO DIFF WBC: CPT | Mod: NTX

## 2018-06-08 PROCEDURE — 63600175 PHARM REV CODE 636 W HCPCS: Mod: NTX | Performed by: STUDENT IN AN ORGANIZED HEALTH CARE EDUCATION/TRAINING PROGRAM

## 2018-06-08 PROCEDURE — 82247 BILIRUBIN TOTAL: CPT | Mod: NTX

## 2018-06-08 PROCEDURE — 87040 BLOOD CULTURE FOR BACTERIA: CPT | Mod: 59,NTX

## 2018-06-08 PROCEDURE — 87075 CULTR BACTERIA EXCEPT BLOOD: CPT | Mod: NTX

## 2018-06-08 PROCEDURE — 84100 ASSAY OF PHOSPHORUS: CPT | Mod: NTX

## 2018-06-08 PROCEDURE — 0W9G3ZX DRAINAGE OF PERITONEAL CAVITY, PERCUTANEOUS APPROACH, DIAGNOSTIC: ICD-10-PCS | Performed by: RADIOLOGY

## 2018-06-08 PROCEDURE — 80053 COMPREHEN METABOLIC PANEL: CPT | Mod: NTX

## 2018-06-08 RX ORDER — ONDANSETRON 8 MG/1
8 TABLET, ORALLY DISINTEGRATING ORAL EVERY 8 HOURS PRN
Status: CANCELLED | OUTPATIENT
Start: 2018-06-08

## 2018-06-08 RX ORDER — GABAPENTIN 300 MG/1
300 CAPSULE ORAL NIGHTLY
Status: DISCONTINUED | OUTPATIENT
Start: 2018-06-08 | End: 2018-06-16 | Stop reason: HOSPADM

## 2018-06-08 RX ORDER — ACETAMINOPHEN 325 MG/1
650 TABLET ORAL EVERY 4 HOURS PRN
Status: CANCELLED | OUTPATIENT
Start: 2018-06-08

## 2018-06-08 RX ORDER — OXYCODONE HYDROCHLORIDE 5 MG/1
10 TABLET ORAL EVERY 4 HOURS PRN
Status: DISCONTINUED | OUTPATIENT
Start: 2018-06-08 | End: 2018-06-12

## 2018-06-08 RX ORDER — SODIUM BICARBONATE 1 MEQ/ML
50 SYRINGE (ML) INTRAVENOUS ONCE
Status: COMPLETED | OUTPATIENT
Start: 2018-06-08 | End: 2018-06-08

## 2018-06-08 RX ORDER — SODIUM BICARBONATE 650 MG/1
1 TABLET ORAL 2 TIMES DAILY
Status: DISCONTINUED | OUTPATIENT
Start: 2018-06-09 | End: 2018-06-16 | Stop reason: HOSPADM

## 2018-06-08 RX ORDER — SODIUM CHLORIDE 0.9 % (FLUSH) 0.9 %
3 SYRINGE (ML) INJECTION
Status: CANCELLED | OUTPATIENT
Start: 2018-06-08

## 2018-06-08 RX ORDER — DIPHENHYDRAMINE HYDROCHLORIDE 50 MG/ML
25 INJECTION INTRAMUSCULAR; INTRAVENOUS EVERY 4 HOURS PRN
Status: CANCELLED | OUTPATIENT
Start: 2018-06-08

## 2018-06-08 RX ORDER — PROMETHAZINE HYDROCHLORIDE 12.5 MG/1
12.5 TABLET ORAL EVERY 6 HOURS PRN
Status: CANCELLED | OUTPATIENT
Start: 2018-06-08

## 2018-06-08 RX ADMIN — URSODIOL 300 MG: 300 CAPSULE ORAL at 09:06

## 2018-06-08 RX ADMIN — POLYETHYLENE GLYCOL 3350 17 G: 17 POWDER, FOR SOLUTION ORAL at 09:06

## 2018-06-08 RX ADMIN — SODIUM CHLORIDE: 0.9 INJECTION, SOLUTION INTRAVENOUS at 01:06

## 2018-06-08 RX ADMIN — OXYCODONE HYDROCHLORIDE 15 MG: 5 TABLET ORAL at 05:06

## 2018-06-08 RX ADMIN — FAMOTIDINE 20 MG: 20 TABLET ORAL at 09:06

## 2018-06-08 RX ADMIN — TORSEMIDE 60 MG: 20 TABLET ORAL at 09:06

## 2018-06-08 RX ADMIN — DOCUSATE SODIUM 100 MG: 100 CAPSULE ORAL at 09:06

## 2018-06-08 RX ADMIN — SEVELAMER CARBONATE 1600 MG: 800 TABLET, FILM COATED ORAL at 08:06

## 2018-06-08 RX ADMIN — SIROLIMUS 1 MG: 1 TABLET, SUGAR COATED ORAL at 09:06

## 2018-06-08 RX ADMIN — OXYCODONE HYDROCHLORIDE 15 MG: 5 TABLET ORAL at 09:06

## 2018-06-08 RX ADMIN — SODIUM BICARBONATE 50 MEQ: 84 INJECTION INTRAVENOUS at 10:06

## 2018-06-08 RX ADMIN — OXYCODONE HYDROCHLORIDE 10 MG: 5 TABLET ORAL at 09:06

## 2018-06-08 RX ADMIN — AMLODIPINE BESYLATE 5 MG: 5 TABLET ORAL at 09:06

## 2018-06-08 RX ADMIN — SEVELAMER CARBONATE 1600 MG: 800 TABLET, FILM COATED ORAL at 11:06

## 2018-06-08 RX ADMIN — BISACODYL 10 MG: 5 TABLET, COATED ORAL at 09:06

## 2018-06-08 RX ADMIN — GABAPENTIN 300 MG: 300 CAPSULE ORAL at 09:06

## 2018-06-08 RX ADMIN — PREDNISONE 60 MG: 20 TABLET ORAL at 09:06

## 2018-06-08 RX ADMIN — OXYCODONE HYDROCHLORIDE 10 MG: 5 TABLET ORAL at 03:06

## 2018-06-08 RX ADMIN — SEVELAMER CARBONATE 1600 MG: 800 TABLET, FILM COATED ORAL at 05:06

## 2018-06-08 RX ADMIN — LEVETIRACETAM 500 MG: 500 TABLET ORAL at 09:06

## 2018-06-08 RX ADMIN — LEVOTHYROXINE SODIUM 75 MCG: 75 TABLET ORAL at 05:06

## 2018-06-08 RX ADMIN — DRONABINOL 2.5 MG: 2.5 CAPSULE ORAL at 09:06

## 2018-06-08 RX ADMIN — FLUCONAZOLE 200 MG: 200 TABLET ORAL at 09:06

## 2018-06-08 NOTE — ED NOTES
Pt remains on cardiac monitor, continuous pulse ox, cycling blood pressures. Side rails up x2, call bell in reach, bed in low position with brake engaged. Abdomen distended,  C/O abdominal pain . Voids 350cc clear urine. Informed of pain med ordered Q 4 hrs. Waiting for admit team to round and inform pt of future plan of care.  IV NS infusing at 75 ml/hr/per dial a flow, site asymptomatic. Pt measuring I and O.

## 2018-06-08 NOTE — ED NOTES
Pt resting on stretcher in NAD, respirations even and unlabored. Stretcher locked and in lowest position, side rails x 2, call bell within reach. Cardiac monitor, pulse ox and BP cuff applied cycling Q 30 minute vitals. Pt offered restroom assistance, pt states no needs at this time. Mother at the bedside. Will continue to monitor and update pt on plan of care.

## 2018-06-08 NOTE — H&P
Inpatient Radiology Pre-procedure Note    History of Present Illness:  Jhonny Diana is a 27 y.o. male who presents for diagnostic paracentesis.  Admission H&P reviewed.  Past Medical History:   Diagnosis Date    Alcoholic hepatitis with ascites     Alcoholic hepatitis with ascites     ESRD on dialysis     History of hematemesis 9/28/2017    Hypertension     Renal disorder     Seizures      Past Surgical History:   Procedure Laterality Date    APPENDECTOMY      ERCP      ESOPHAGOGASTRODUODENOSCOPY      LIVER TRANSPLANT         Review of Systems:   As documented in primary team H&P    Home Meds:   Prior to Admission medications    Medication Sig Start Date End Date Taking? Authorizing Provider   amLODIPine (NORVASC) 5 MG tablet Take 1 tablet (5 mg total) by mouth once daily. 5/23/18  Yes Jw Valdivia MD   dronabinol (MARINOL) 2.5 MG capsule Take 1 capsule (2.5 mg total) by mouth every evening. 4/10/18  Yes Nathanael Medina MD   ergocalciferol (ERGOCALCIFEROL) 50,000 unit Cap Take 1 capsule (50,000 Units total) by mouth every 7 days. 4/9/18 6/26/18 Yes Solis Chapa MD   famotidine (PEPCID) 20 MG tablet Take 1 tablet (20 mg total) by mouth every evening. 5/23/18 5/23/19 Yes Jw Valdivia MD   fluconazole (DIFLUCAN) 200 MG Tab Take 1 tablet (200 mg total) by mouth once daily. STOP 6/23/18 5/24/18 6/23/18 Yes Jw Valdivia MD   gabapentin (NEURONTIN) 300 MG capsule Take 2 capsules (600 mg total) by mouth every evening. 6/7/18 9/5/18 Yes Jacquelyn Gant, MARCI   levETIRAcetam (KEPPRA) 500 MG Tab Take 1 tablet (500 mg total) by mouth 2 (two) times daily. 11/7/17 11/7/18 Yes Nathanael Medina MD   levothyroxine (SYNTHROID) 75 MCG tablet Take 1 tablet (75 mcg total) by mouth before breakfast. 12/21/17  Yes Nathanael Medina MD   polyethylene glycol (GLYCOLAX) 17 gram PwPk Mix 1 packet (17 g) in liquid and take by mouth 2 (two) times daily. 6/4/18  Yes Imer Badillo MD   predniSONE (DELTASONE) 20 MG tablet Take  daily by mouth daily: 60mg 6/4-6/10, 50mg (2.5 tab) 6/11-6/17, 40mg (2 tab) 6/18-6/24, 30mg (1.5 tab) 6/25-7/1, 20 mg 7/2/18 6/4/18  Yes Imer Badillo MD   sevelamer carbonate (RENVELA) 800 mg Tab Take 2 tablets (1,600 mg total) by mouth 3 (three) times daily with meals. 6/4/18  Yes Imer Badillo MD   sirolimus (RAPAMUNE) 1 MG Tab Take 1 tablet (1 mg total) by mouth once daily. 6/5/18  Yes Imer Badillo MD   torsemide (DEMADEX) 20 MG Tab Take 3 tablets (60 mg total) by mouth once daily. 6/4/18  Yes Imer Badillo MD   ursodiol (ACTIGALL) 300 mg capsule Take 1 capsule (300 mg total) by mouth 2 (two) times daily. 4/9/18 4/9/19 Yes Michelle Herrera MD   bisacodyl (DULCOLAX) 5 mg EC tablet Take 2 tablets (10 mg total) by mouth once daily. 4/10/18   Solis Chapa MD   docusate sodium (COLACE) 100 MG capsule Take 1 capsule (100 mg total) by mouth 2 (two) times daily. 5/23/18   Jw Valdivia MD   ondansetron (ZOFRAN-ODT) 8 MG TbDL Take 1 tablet (8 mg total) by mouth every 8 (eight) hours as needed (nausea). 1/10/18   Quiana Carter MD   oxyCODONE (ROXICODONE) 10 mg Tab immediate release tablet Take 0.5-1 tablets (5-10 mg total) by mouth every 4 (four) hours as needed for Pain. 5/23/18   Mandy Crespo NP     Scheduled Meds:    amLODIPine  5 mg Oral Daily    bisacodyl  10 mg Oral Daily    docusate sodium  100 mg Oral BID    dronabinol  2.5 mg Oral QHS    famotidine  20 mg Oral QHS    fluconazole  200 mg Oral Daily    gabapentin  300 mg Oral QHS    heparin (porcine)  5,000 Units Subcutaneous Q8H    levETIRAcetam  500 mg Oral BID    levothyroxine  75 mcg Oral Before breakfast    polyethylene glycol  17 g Oral BID    predniSONE  60 mg Oral Daily    sevelamer carbonate  1,600 mg Oral TID WM    sirolimus  1 mg Oral Daily    [START ON 6/9/2018] sodium bicarbonate  1 tablet Oral BID    torsemide  60 mg Oral Daily    ursodiol  300 mg Oral BID     Continuous Infusions:   PRN Meds:acetaminophen,  oxyCODONE, oxyCODONE, ramelteon  Anticoagulants/Antiplatelets: no anticoagulation    Allergies:   Review of patient's allergies indicates:   Allergen Reactions    Bactrim [sulfamethoxazole-trimethoprim] Other (See Comments)     Mookie Trell Syndrome     Sedation Hx: have not been any systemic reactions    Labs:    Recent Labs  Lab 06/08/18  0341   INR 1.0       Recent Labs  Lab 06/08/18  0341   WBC 5.65   HGB 9.0*   HCT 27.8*   MCV 99*   PLT 26*      Recent Labs  Lab 06/08/18  0341   GLU 95      K 4.7      CO2 18*   BUN 64*   CREATININE 3.1*   CALCIUM 7.9*   MG 1.7   ALT 21   AST 22   ALBUMIN 2.6*   BILITOT 1.0         Vitals:  Temp: 98.9 °F (37.2 °C) (06/08/18 0700)  Pulse: 101 (06/08/18 1401)  Resp: (!) 21 (06/08/18 1401)  BP: 122/84 (06/08/18 1401)  SpO2: 99 % (06/08/18 1401)     Physical Exam:  ASA: 2  Mallampati: 2    General: no acute distress  Mental Status: alert and oriented to person, place and time  HEENT: normocephalic, atraumatic  Chest: unlabored breathing  Heart: regular heart rate  Abdomen: nondistended  Extremity: moves all extremities    Plan: paracentesis  Sedation Plan: local    Sri Gutierres M.D. 3:05 PM 6/8/2018

## 2018-06-08 NOTE — PROCEDURES
Radiology Post-Procedure Note    Pre Op Diagnosis: Ascites  Post Op Diagnosis: Same    Procedure: Paracentesis    Procedure performed by: Bhavik    Written Informed Consent Obtained: Yes  Specimen Removed: NO  Estimated Blood Loss: Minimal    Findings:   Micropuncture needle was advanced under US and 20 cc of clear yellow fluid was aspirated for diagnostic purposes.     Patient tolerated procedure well.    Sri Gutierres M.D. 3:18 PM 6/8/2018

## 2018-06-08 NOTE — CONSULTS
[facility]  Nephrology  Consult Note    Patient Name: Jhonny Diana  MRN: [id number]  Admission Date: 6/7/2018  Hospital Length of Stay: 1 days  Attending Provider: Luís Green MD   Primary Care Physician: Primary Doctor No  Principal Problem:<principal problem not specified>    Inpatient consult to Nephrology  Consult performed by: SAMARA SOLOMON  Consult ordered by: JOSÉ MIGUEL DURAN  Reason for consult: esrd        Subjective:     HPI: 26 y/o  male with significant history for ETOH cirrhosis sp  S/p DDLT 10/19/2017 complicated by  seizures (swtiched off prograf to cyclo)/superficial wound infection now resolved, ATN dialysis dependant in iHD MWF, multiple admissions for fevers finally dx with peritonitis in November/neg for bile leak, recent admission 5/8-5/23 for fever/elevated bili/anemia at which time seen by [facility] recommended no ERCP as imagining showed stent in place and Hematology recommended steroids for possible ITP/loweing dose of cyclosporine for possible TMA, and then again readmission 5/27-6/4 for acute on chronic mid abdominal pain and constipation who is again acute on chronic abdominal pain and tachycardia. Tachycardia resolved after IV hydration. Seen by General surgery in ED. CT abdomen showed large ascites/diffuse body wall anasarca, mod right pleural effusion, slight bladder wall thickening.     Was seen by Pain Management outpatient yesterday and increase gabapentin to 600 mg     Nephrology consulted for hemodialysis. LAURA dialysis dependant on HD twice a week Mon and Fri via R IJ Permacats[facility] at [facility] under the care of Dr. Peterson. Last HD 6/4 at [facility] after discharge from hospital same day. Good UOP. Cr 3.1 from 3.2 yesterday.      Past Medical History:   Diagnosis Date    Alcoholic hepatitis with ascites     Alcoholic hepatitis with ascites     ESRD on dialysis     History of hematemesis 9/28/2017    Hypertension     Renal disorder     Seizures         Past Surgical History:   Procedure Laterality Date    APPENDECTOMY      ERCP      ESOPHAGOGASTRODUODENOSCOPY      LIVER TRANSPLANT         Review of patient's allergies indicates:   Allergen Reactions    Bactrim [sulfamethoxazole-trimethoprim] Other (See Comments)     Mookie Trell Syndrome     Current Facility-Administered Medications   Medication Frequency    0.9%  NaCl infusion Continuous    acetaminophen tablet 650 mg Q8H PRN    amLODIPine tablet 5 mg Daily    bisacodyl EC tablet 10 mg Daily    docusate sodium capsule 100 mg BID    dronabinol capsule 2.5 mg QHS    famotidine tablet 20 mg QHS    fluconazole tablet 200 mg Daily    gabapentin capsule 600 mg QHS    heparin (porcine) injection 5,000 Units Q8H    levETIRAcetam tablet 500 mg BID    levothyroxine tablet 75 mcg Before breakfast    oxyCODONE immediate release tablet 15 mg Q4H PRN    oxyCODONE immediate release tablet 5 mg Q4H PRN    polyethylene glycol packet 17 g BID    predniSONE tablet 60 mg Daily    ramelteon tablet 8 mg Nightly PRN    sevelamer carbonate tablet 1,600 mg TID WM    sirolimus tablet 1 mg Daily    torsemide tablet 60 mg Daily    ursodiol capsule 300 mg BID     Current Outpatient Prescriptions   Medication    amLODIPine (NORVASC) 5 MG tablet    dronabinol (MARINOL) 2.5 MG capsule    ergocalciferol (ERGOCALCIFEROL) 50,000 unit Cap    famotidine (PEPCID) 20 MG tablet    fluconazole (DIFLUCAN) 200 MG Tab    gabapentin (NEURONTIN) 300 MG capsule    levETIRAcetam (KEPPRA) 500 MG Tab    levothyroxine (SYNTHROID) 75 MCG tablet    polyethylene glycol (GLYCOLAX) 17 gram PwPk    predniSONE (DELTASONE) 20 MG tablet    sevelamer carbonate (RENVELA) 800 mg Tab    sirolimus (RAPAMUNE) 1 MG Tab    torsemide (DEMADEX) 20 MG Tab    ursodiol (ACTIGALL) 300 mg capsule    bisacodyl (DULCOLAX) 5 mg EC tablet    docusate sodium (COLACE) 100 MG capsule    ondansetron (ZOFRAN-ODT) 8 MG TbDL    oxyCODONE  (ROXICODONE) 10 mg Tab immediate release tablet     Family History     Problem Relation (Age of Onset)    No Known Problems Mother        Social History Main Topics    Smoking status: Former Smoker     Packs/day: 1.00     Years: 10.00     Types: Cigarettes     Start date: 2/19/2017    Smokeless tobacco: Never Used    Alcohol use No      Comment: a fifth of liquor daily for years, cut back over last 2 months    Drug use: No    Sexual activity: Not on file     Review of Systems  Objective:     Vital Signs (Most Recent):  Temp: 98.9 °F (37.2 °C) (06/08/18 0700)  Pulse: 105 (06/08/18 1001)  Resp: 18 (06/08/18 1001)  BP: (!) 142/84 (06/08/18 1001)  SpO2: 100 % (06/08/18 1001)  O2 Device (Oxygen Therapy): room air (06/07/18 1846) Vital Signs (24h Range):  Temp:  [98.6 °F (37 °C)-98.9 °F (37.2 °C)] 98.9 °F (37.2 °C)  Pulse:  [] 105  Resp:  [10-20] 18  SpO2:  [97 %-100 %] 100 %  BP: (127-155)/(76-92) 142/84     Weight: 63.5 kg (140 lb) (06/07/18 1746)  Body mass index is 22.6 kg/m².  Body surface area is 1.72 meters squared.    I/O last 3 completed shifts:  In: 2000 [IV Piggyback:2000]  Out: 1150 [Urine:1150]    Physical Exam   Constitutional: He is oriented to person, place, and time.   Chronic ill appearing. Sitting up in bed and eating breakfast.    HENT:   Head: Atraumatic.   Eyes: EOM are normal.   Neck: Neck supple.   Cardiovascular: Normal rate and regular rhythm.    Pulmonary/Chest: Effort normal and breath sounds normal.   Diminished at bases.    Abdominal: Soft. Bowel sounds are normal. He exhibits distension. There is no tenderness.   Musculoskeletal: He exhibits no edema.   Neurological: He is oriented to person, place, and time.   Skin: Skin is warm and dry.   R IJ permacath   Psychiatric: He has a normal mood and affect.       Significant Labs:  All labs within the past 24 hours have been reviewed.    Significant Imaging:  Labs: Reviewed    Assessment/Plan:     ESRD Monday/Friday    Patient is s/p  DDLT 10/19/2017 complicated by ATN now dialysis dependant in iHD MF via R IJ [facility]acatsage at [facility] under the care of Dr. Peterson. Last HD 6/4 at [facility] after discharge from hospital same day. Good UOP. Cr 3.1 from 3.2 yesterday.    -Lab reviewed. Was given sodium bicarb 50 meq IV in ED.   -Continue torsemide. Strict IO. Daily weight  -Start oral sodium bicarb. Hold off RRT at this time and monitor RFP/IO/wt daily   -Decrease gabapentin 300 mg   -Discussed above with primary team. Please call with any questions.                 Thank you for your consult. I will follow-up with patient. Please contact us if you have any additional questions.    Kay Guillen, SAMREEN  Nephrology  [facility]        I have reviewed and concur with the NP's history, physical, assessment, and plan. I have personally interviewed and examined the patient at bedside.No need for HD over the weekend and 24 hr creatinine clearance.

## 2018-06-08 NOTE — HOSPITAL COURSE
Interval history: no acute events overnight. He continues to have abdominal pain, which is chronic in nature. Continue antidepressant and sleep aid.  Patient reports sleeping better at night. Psychiatry consulted to aid in transition off narcotics.  Started suboxone per Dr. Umana, and tolerating. Mother at bedside with patient.  Hematology consulted for persistent thrombocytopenia.  Transfused pack of platelets 6/13 and responsed.  Response indicates unlikely ITP.  Nephrology following, tolerated HD 6/11. HD today.  Monitor.

## 2018-06-08 NOTE — PROGRESS NOTES
Paracentesis complete. 20 mLs peritoneal fluid drained. Pt tolerated well. Dressing to right abd clean, dry, and intact.. Specimens sent per lab order. Report called to ED nurse.

## 2018-06-08 NOTE — SUBJECTIVE & OBJECTIVE
Scheduled Meds:   amLODIPine  5 mg Oral Daily    bisacodyl  10 mg Oral Daily    docusate sodium  100 mg Oral BID    dronabinol  2.5 mg Oral QHS    famotidine  20 mg Oral QHS    fluconazole  200 mg Oral Daily    gabapentin  300 mg Oral QHS    heparin (porcine)  5,000 Units Subcutaneous Q8H    levETIRAcetam  500 mg Oral BID    levothyroxine  75 mcg Oral Before breakfast    polyethylene glycol  17 g Oral BID    predniSONE  60 mg Oral Daily    sevelamer carbonate  1,600 mg Oral TID WM    sirolimus  1 mg Oral Daily    [START ON 6/9/2018] sodium bicarbonate  1 tablet Oral BID    torsemide  60 mg Oral Daily    ursodiol  300 mg Oral BID     Continuous Infusions:  PRN Meds:acetaminophen, oxyCODONE, oxyCODONE, ramelteon    Review of Systems   Constitutional: Positive for fatigue. Negative for activity change, appetite change and chills.   HENT: Negative.    Eyes: Negative.    Respiratory: Negative for chest tightness and shortness of breath.    Cardiovascular: Negative for palpitations and leg swelling.   Gastrointestinal: Positive for abdominal distention, abdominal pain and constipation. Negative for nausea and vomiting.   Genitourinary: Negative.    Musculoskeletal: Positive for back pain. Negative for arthralgias.   Skin: Negative.    Allergic/Immunologic: Positive for immunocompromised state.   Neurological: Negative.    Psychiatric/Behavioral: Positive for sleep disturbance. Negative for confusion and decreased concentration. The patient is nervous/anxious.      Objective:     Vital Signs (Most Recent):  Temp: 98.9 °F (37.2 °C) (06/08/18 0700)  Pulse: 100 (06/08/18 1547)  Resp: 18 (06/08/18 1528)  BP: 122/68 (06/08/18 1547)  SpO2: 100 % (06/08/18 1547) Vital Signs (24h Range):  Temp:  [98.6 °F (37 °C)-98.9 °F (37.2 °C)] 98.9 °F (37.2 °C)  Pulse:  [] 100  Resp:  [10-21] 18  SpO2:  [97 %-100 %] 100 %  BP: (122-155)/(68-92) 122/68     Weight: 63.5 kg (140 lb)  Body mass index is 22.6  kg/m².    Intake/Output - Last 3 Shifts       06/06 0700 - 06/07 0659 06/07 0700 - 06/08 0659 06/08 0700 - 06/09 0659    IV Piggyback  2000     Total Intake(mL/kg)  2000 (31.5)     Urine (mL/kg/hr)  800 1870 (3.2)    Total Output   800 1870    Net   +1200 -1870                 Physical Exam   Constitutional: He is oriented to person, place, and time. Vital signs are normal. He appears well-developed.   Chronic ill appearing    HENT:   Head: Normocephalic and atraumatic.   Right Ear: Hearing, tympanic membrane and ear canal normal.   Left Ear: Hearing, tympanic membrane and ear canal normal.   Nose: Nose normal.   Mouth/Throat: Uvula is midline.   Eyes: Pupils are equal, round, and reactive to light.   Neck: Trachea normal, normal range of motion and full passive range of motion without pain. No JVD present.   Cardiovascular: Normal rate, regular rhythm, normal heart sounds, intact distal pulses and normal pulses.    No murmur heard.  Pulmonary/Chest: Effort normal and breath sounds normal. No respiratory distress. He has no wheezes. He has no rales. He exhibits no tenderness.   Diminished at bases.    Abdominal: Soft. Normal appearance and bowel sounds are normal. He exhibits distension. He exhibits no ascites. There is tenderness. There is no rigidity, no rebound, no guarding and no CVA tenderness.   Musculoskeletal: Normal range of motion. He exhibits edema (BLE ). He exhibits no tenderness.   Neurological: He is alert and oriented to person, place, and time. He has normal reflexes.   Skin: Skin is warm and dry.   R IJ permacath   Psychiatric: He has a normal mood and affect. His speech is normal and behavior is normal. Judgment and thought content normal. Cognition and memory are normal.   Nursing note and vitals reviewed.      Laboratory:  Immunosuppressants         Stop Route Frequency     sirolimus tablet 1 mg      -- Oral Daily        CBC:     Recent Labs  Lab 06/08/18  0341   WBC 5.65   RBC 2.80*   HGB 9.0*    HCT 27.8*   PLT 26*   MCV 99*   MCH 32.1*   MCHC 32.4     CMP:     Recent Labs  Lab 06/08/18  0341   GLU 95   CALCIUM 7.9*   ALBUMIN 2.6*   PROT 5.6*      K 4.7   CO2 18*      BUN 64*   CREATININE 3.1*   ALKPHOS 105   ALT 21   AST 22   BILITOT 1.0     Labs within the past 24 hours have been reviewed.    Diagnostic Results:  pertinent imaging reviewed.

## 2018-06-08 NOTE — ED NOTES
Pt resting on stretcher in NAD, respirations even and unlabored. Stretcher locked and in lowest position, side rails x 2, call bell within reach. Cardiac monitor, pulse ox and BP cuff applied cycling Q 30 minute vitals. Pt offered restroom assistance, pt states no needs at this time. Mother at the bedside, updated on wait for CT scan. Will continue to monitor and update pt on plan of care.

## 2018-06-08 NOTE — ED NOTES
Diet not delivered yet. Dietary called and states it is out for delivery- pt informed , apology offered for delay.

## 2018-06-08 NOTE — ASSESSMENT & PLAN NOTE
Will admit to LTS    CT abd with PO contrast  NPO after CT pending results  Pt was hydrated in the ER with good response in tachycardia.  Given chronic kidney dysfunction and home lasix stability, will opt for gentle hydration overnight with IVMF  Continue Home meds  Will hold on abx and trend fever curve, WBC count and HR pending CT  Pt is not currently on Cellcept  Continue Home immunosuppression

## 2018-06-08 NOTE — PROGRESS NOTES
[facility]  Liver Transplant  Progress Note    Patient Name: Jhonny Diana  MRN: [id number]  Admission Date: 2018  Hospital Length of Stay: 1 days  Code Status: Full Code  Primary Care Provider: Primary Doctor No  Post-Operative Day: 232    ORGAN:   LIVER  Disease Etiology: Acute Alcoholic Hepatitis  Donor Type:    - Brain Death  CDC High Risk:   No  Donor CMV Status:   Donor CMV Status: Positive  Donor HBcAB:   Negative  Donor HCV Status:   Negative  Whole or Partial: Whole Liver  Biliary Anastomosis: End to End  Arterial Anatomy: Standard  Subjective:     History of Present Illness:  26 y/o M who underwent liver Txp in Oct of 2017 present to the ER with diffuse abdominal pain and tachycardia.  Pt has recently been discharged with similar complaints.  While he has been dealing with this pain for some time, reports it is getting worse over the past day.  Pt has peen tolerating normal PO, urinating a good amount, and having normal bowel function.  Denies fevers or chills.  Pain is worse on the left flank.       Hospital Course:  Admitted via ER over night for complaint of abdominal pain.  Well known to liver team, abdominal pain chronic and persistent since transplant 10/2018.  Referred to pain management who saw patient  with recommendation of gabapentin and decrease narcotic use.  Diagnostic paracentesis completed to assess for peritonitis as history of this, cell counts pending.  Otherwise upon assessment no change in physical exam.  Nephrology consulted for management of HD.  Will not need HD today per nephrology.      Scheduled Meds:   amLODIPine  5 mg Oral Daily    bisacodyl  10 mg Oral Daily    docusate sodium  100 mg Oral BID    dronabinol  2.5 mg Oral QHS    famotidine  20 mg Oral QHS    fluconazole  200 mg Oral Daily    gabapentin  300 mg Oral QHS    heparin (porcine)  5,000 Units Subcutaneous Q8H    levETIRAcetam  500 mg Oral BID    levothyroxine  75 mcg Oral Before  breakfast    polyethylene glycol  17 g Oral BID    predniSONE  60 mg Oral Daily    sevelamer carbonate  1,600 mg Oral TID WM    sirolimus  1 mg Oral Daily    [START ON 6/9/2018] sodium bicarbonate  1 tablet Oral BID    torsemide  60 mg Oral Daily    ursodiol  300 mg Oral BID     Continuous Infusions:  PRN Meds:acetaminophen, oxyCODONE, oxyCODONE, ramelteon    Review of Systems   Constitutional: Positive for fatigue. Negative for activity change, appetite change and chills.   HENT: Negative.    Eyes: Negative.    Respiratory: Negative for chest tightness and shortness of breath.    Cardiovascular: Negative for palpitations and leg swelling.   Gastrointestinal: Positive for abdominal distention, abdominal pain and constipation. Negative for nausea and vomiting.   Genitourinary: Negative.    Musculoskeletal: Positive for back pain. Negative for arthralgias.   Skin: Negative.    Allergic/Immunologic: Positive for immunocompromised state.   Neurological: Negative.    Psychiatric/Behavioral: Positive for sleep disturbance. Negative for confusion and decreased concentration. The patient is nervous/anxious.      Objective:     Vital Signs (Most Recent):  Temp: 98.9 °F (37.2 °C) (06/08/18 0700)  Pulse: 100 (06/08/18 1547)  Resp: 18 (06/08/18 1528)  BP: 122/68 (06/08/18 1547)  SpO2: 100 % (06/08/18 1547) Vital Signs (24h Range):  Temp:  [98.6 °F (37 °C)-98.9 °F (37.2 °C)] 98.9 °F (37.2 °C)  Pulse:  [] 100  Resp:  [10-21] 18  SpO2:  [97 %-100 %] 100 %  BP: (122-155)/(68-92) 122/68     Weight: 63.5 kg (140 lb)  Body mass index is 22.6 kg/m².    Intake/Output - Last 3 Shifts       06/06 0700 - 06/07 0659 06/07 0700 - 06/08 0659 06/08 0700 - 06/09 0659    IV Piggyback  2000     Total Intake(mL/kg)  2000 (31.5)     Urine (mL/kg/hr)  800 1870 (3.2)    Total Output   800 1870    Net   +1200 -1870                 Physical Exam   Constitutional: He is oriented to person, place, and time. Vital signs are normal. He appears  well-developed.   Chronic ill appearing    HENT:   Head: Normocephalic and atraumatic.   Right Ear: Hearing, tympanic membrane and ear canal normal.   Left Ear: Hearing, tympanic membrane and ear canal normal.   Nose: Nose normal.   Mouth/Throat: Uvula is midline.   Eyes: Pupils are equal, round, and reactive to light.   Neck: Trachea normal, normal range of motion and full passive range of motion without pain. No JVD present.   Cardiovascular: Normal rate, regular rhythm, normal heart sounds, intact distal pulses and normal pulses.    No murmur heard.  Pulmonary/Chest: Effort normal and breath sounds normal. No respiratory distress. He has no wheezes. He has no rales. He exhibits no tenderness.   Diminished at bases.    Abdominal: Soft. Normal appearance and bowel sounds are normal. He exhibits distension. He exhibits no ascites. There is tenderness. There is no rigidity, no rebound, no guarding and no CVA tenderness.   Musculoskeletal: Normal range of motion. He exhibits edema (BLE ). He exhibits no tenderness.   Neurological: He is alert and oriented to person, place, and time. He has normal reflexes.   Skin: Skin is warm and dry.   R IJ permacath   Psychiatric: He has a normal mood and affect. His speech is normal and behavior is normal. Judgment and thought content normal. Cognition and memory are normal.   Nursing note and vitals reviewed.      Laboratory:  Immunosuppressants         Stop Route Frequency     sirolimus tablet 1 mg      -- Oral Daily        CBC:     Recent Labs  Lab 06/08/18  0341   WBC 5.65   RBC 2.80*   HGB 9.0*   HCT 27.8*   PLT 26*   MCV 99*   MCH 32.1*   MCHC 32.4     CMP:     Recent Labs  Lab 06/08/18  0341   GLU 95   CALCIUM 7.9*   ALBUMIN 2.6*   PROT 5.6*      K 4.7   CO2 18*      BUN 64*   CREATININE 3.1*   ALKPHOS 105   ALT 21   AST 22   BILITOT 1.0     Labs within the past 24 hours have been reviewed.    Diagnostic Results:  pertinent imaging reviewed.     Assessment/Plan:      * Abdominal pain    - no physical changes  - relieved with po and iv pain medications  - decrease oral pain meds to oxy 5-10 mg q 4 hr          Severe protein-calorie malnutrition    - encourage po intake            Anemia of chronic renal failure    -h/h stable          S/P liver transplant    - LFTs stable  - bilirubin stable        ESRD Monday/Friday    - nephrology consulted  - chronic hd mon/friday  - nephrology does not feel HD necessary today, will plan for HD monday          Ascites due to alcoholic cirrhosis    - diagnostic para, labs pending  - monitor              VTE Risk Mitigation         Ordered     heparin (porcine) injection 5,000 Units  Every 8 hours      06/07/18 2252     IP VTE HIGH RISK PATIENT  Once      06/07/18 2252     Place sequential compression device  Until discontinued      06/07/18 2252          The patients clinical status was discussed at multidisplinary rounds, involving transplant surgery, transplant medicine, pharmacy, nursing, nutrition, and social work    Discharge Planning:  Monitor kidney function --> may need outpt HD  Monitor HH needs vs rehab for deconditioned status      Seda Camacho NP  Liver Transplant  [facility]

## 2018-06-08 NOTE — HPI
26 y/o M who underwent liver Txp in Oct of 2017 present to the ER with diffuse abdominal pain and tachycardia.  Pt has recently been discharged with similar complaints.  While he has been dealing with this pain for some time, it is getting worse over the past day.  Pt has peen tolerating normal PO, urinating a good amount, and having normal bowel function.  Denies fevers or chills.  Pain is worse on the left flank.

## 2018-06-08 NOTE — SUBJECTIVE & OBJECTIVE
No current facility-administered medications on file prior to encounter.      Current Outpatient Prescriptions on File Prior to Encounter   Medication Sig    amLODIPine (NORVASC) 5 MG tablet Take 1 tablet (5 mg total) by mouth once daily.    dronabinol (MARINOL) 2.5 MG capsule Take 1 capsule (2.5 mg total) by mouth every evening.    ergocalciferol (ERGOCALCIFEROL) 50,000 unit Cap Take 1 capsule (50,000 Units total) by mouth every 7 days.    famotidine (PEPCID) 20 MG tablet Take 1 tablet (20 mg total) by mouth every evening.    fluconazole (DIFLUCAN) 200 MG Tab Take 1 tablet (200 mg total) by mouth once daily. STOP 6/23/18    gabapentin (NEURONTIN) 300 MG capsule Take 2 capsules (600 mg total) by mouth every evening.    levETIRAcetam (KEPPRA) 500 MG Tab Take 1 tablet (500 mg total) by mouth 2 (two) times daily.    levothyroxine (SYNTHROID) 75 MCG tablet Take 1 tablet (75 mcg total) by mouth before breakfast.    polyethylene glycol (GLYCOLAX) 17 gram PwPk Mix 1 packet (17 g) in liquid and take by mouth 2 (two) times daily.    predniSONE (DELTASONE) 20 MG tablet Take daily by mouth daily: 60mg 6/4-6/10, 50mg (2.5 tab) 6/11-6/17, 40mg (2 tab) 6/18-6/24, 30mg (1.5 tab) 6/25-7/1, 20 mg 7/2/18    sevelamer carbonate (RENVELA) 800 mg Tab Take 2 tablets (1,600 mg total) by mouth 3 (three) times daily with meals.    sirolimus (RAPAMUNE) 1 MG Tab Take 1 tablet (1 mg total) by mouth once daily.    torsemide (DEMADEX) 20 MG Tab Take 3 tablets (60 mg total) by mouth once daily.    ursodiol (ACTIGALL) 300 mg capsule Take 1 capsule (300 mg total) by mouth 2 (two) times daily.    bisacodyl (DULCOLAX) 5 mg EC tablet Take 2 tablets (10 mg total) by mouth once daily.    docusate sodium (COLACE) 100 MG capsule Take 1 capsule (100 mg total) by mouth 2 (two) times daily.    ondansetron (ZOFRAN-ODT) 8 MG TbDL Take 1 tablet (8 mg total) by mouth every 8 (eight) hours as needed (nausea).    oxyCODONE (ROXICODONE) 10 mg Tab  immediate release tablet Take 0.5-1 tablets (5-10 mg total) by mouth every 4 (four) hours as needed for Pain.    [DISCONTINUED] gabapentin (NEURONTIN) 100 MG capsule Take 3 capsules (300 mg total) by mouth every evening.       Review of patient's allergies indicates:   Allergen Reactions    Bactrim [sulfamethoxazole-trimethoprim] Other (See Comments)     Mookie Trell Syndrome       Past Medical History:   Diagnosis Date    Alcoholic hepatitis with ascites     Alcoholic hepatitis with ascites     ESRD on dialysis     History of hematemesis 9/28/2017    Hypertension     Renal disorder     Seizures      Past Surgical History:   Procedure Laterality Date    APPENDECTOMY      ERCP      ESOPHAGOGASTRODUODENOSCOPY      LIVER TRANSPLANT       Family History     Problem Relation (Age of Onset)    No Known Problems Mother        Social History Main Topics    Smoking status: Former Smoker     Packs/day: 1.00     Years: 10.00     Types: Cigarettes     Start date: 2/19/2017    Smokeless tobacco: Never Used    Alcohol use No      Comment: a fifth of liquor daily for years, cut back over last 2 months    Drug use: No    Sexual activity: Not on file     Review of Systems     ROS notable for abdominal pain  Normal GI function per report  Denies fevers or chills  He does report abd distention, chronic and stable  Objective:     Vital Signs (Most Recent):  Temp: 98.6 °F (37 °C) (06/07/18 1746)  Pulse: 110 (06/07/18 2202)  Resp: 16 (06/07/18 2201)  BP: (!) 142/82 (06/07/18 2202)  SpO2: 100 % (06/07/18 2202) Vital Signs (24h Range):  Temp:  [98.6 °F (37 °C)] 98.6 °F (37 °C)  Pulse:  [110-133] 110  Resp:  [16-20] 16  SpO2:  [97 %-100 %] 100 %  BP: (137-155)/(82-92) 142/82     Weight: 63.5 kg (140 lb)  Body mass index is 22.6 kg/m².    Physical Exam   Gen: NAD  CV: Tachy  Pulm: unlabored  GI: Soft, Distended, voluntary guarding.  Chevron well healed.  No rebound  Neuro: Non focal  Ext: No edema    Significant  Labs:  CBC:   Recent Labs  Lab 06/07/18  1808   WBC 9.78   RBC 3.97*   HGB 12.8*   HCT 39.0*   PLT 33*   MCV 98   MCH 32.2*   MCHC 32.8     CMP:   Recent Labs  Lab 06/07/18  1937   *   CALCIUM 8.1*   ALBUMIN 2.8*   PROT 6.4      K 4.5   CO2 17*      BUN 64*   CREATININE 3.2*   ALKPHOS 135   ALT 25   AST 35   BILITOT 1.1*     Coagulation:   Recent Labs  Lab 06/07/18  1808   LABPROT 10.2   INR 1.0       Significant Diagnostics:  KUB and CXR reviewed

## 2018-06-08 NOTE — ASSESSMENT & PLAN NOTE
- nephrology consulted  - chronic hd mon/friday  - nephrology does not feel HD necessary today, will plan for HD monday

## 2018-06-08 NOTE — SUBJECTIVE & OBJECTIVE
Past Medical History:   Diagnosis Date    Alcoholic hepatitis with ascites     Alcoholic hepatitis with ascites     ESRD on dialysis     History of hematemesis 9/28/2017    Hypertension     Renal disorder     Seizures        Past Surgical History:   Procedure Laterality Date    APPENDECTOMY      ERCP      ESOPHAGOGASTRODUODENOSCOPY      LIVER TRANSPLANT         Review of patient's allergies indicates:   Allergen Reactions    Bactrim [sulfamethoxazole-trimethoprim] Other (See Comments)     Mookie Trell Syndrome     Current Facility-Administered Medications   Medication Frequency    0.9%  NaCl infusion Continuous    acetaminophen tablet 650 mg Q8H PRN    amLODIPine tablet 5 mg Daily    bisacodyl EC tablet 10 mg Daily    docusate sodium capsule 100 mg BID    dronabinol capsule 2.5 mg QHS    famotidine tablet 20 mg QHS    fluconazole tablet 200 mg Daily    gabapentin capsule 600 mg QHS    heparin (porcine) injection 5,000 Units Q8H    levETIRAcetam tablet 500 mg BID    levothyroxine tablet 75 mcg Before breakfast    oxyCODONE immediate release tablet 15 mg Q4H PRN    oxyCODONE immediate release tablet 5 mg Q4H PRN    polyethylene glycol packet 17 g BID    predniSONE tablet 60 mg Daily    ramelteon tablet 8 mg Nightly PRN    sevelamer carbonate tablet 1,600 mg TID WM    sirolimus tablet 1 mg Daily    torsemide tablet 60 mg Daily    ursodiol capsule 300 mg BID     Current Outpatient Prescriptions   Medication    amLODIPine (NORVASC) 5 MG tablet    dronabinol (MARINOL) 2.5 MG capsule    ergocalciferol (ERGOCALCIFEROL) 50,000 unit Cap    famotidine (PEPCID) 20 MG tablet    fluconazole (DIFLUCAN) 200 MG Tab    gabapentin (NEURONTIN) 300 MG capsule    levETIRAcetam (KEPPRA) 500 MG Tab    levothyroxine (SYNTHROID) 75 MCG tablet    polyethylene glycol (GLYCOLAX) 17 gram PwPk    predniSONE (DELTASONE) 20 MG tablet    sevelamer carbonate (RENVELA) 800 mg Tab    sirolimus (RAPAMUNE) 1  MG Tab    torsemide (DEMADEX) 20 MG Tab    ursodiol (ACTIGALL) 300 mg capsule    bisacodyl (DULCOLAX) 5 mg EC tablet    docusate sodium (COLACE) 100 MG capsule    ondansetron (ZOFRAN-ODT) 8 MG TbDL    oxyCODONE (ROXICODONE) 10 mg Tab immediate release tablet     Family History     Problem Relation (Age of Onset)    No Known Problems Mother        Social History Main Topics    Smoking status: Former Smoker     Packs/day: 1.00     Years: 10.00     Types: Cigarettes     Start date: 2/19/2017    Smokeless tobacco: Never Used    Alcohol use No      Comment: a fifth of liquor daily for years, cut back over last 2 months    Drug use: No    Sexual activity: Not on file     Review of Systems  Objective:     Vital Signs (Most Recent):  Temp: 98.9 °F (37.2 °C) (06/08/18 0700)  Pulse: 105 (06/08/18 1001)  Resp: 18 (06/08/18 1001)  BP: (!) 142/84 (06/08/18 1001)  SpO2: 100 % (06/08/18 1001)  O2 Device (Oxygen Therapy): room air (06/07/18 1846) Vital Signs (24h Range):  Temp:  [98.6 °F (37 °C)-98.9 °F (37.2 °C)] 98.9 °F (37.2 °C)  Pulse:  [] 105  Resp:  [10-20] 18  SpO2:  [97 %-100 %] 100 %  BP: (127-155)/(76-92) 142/84     Weight: 63.5 kg (140 lb) (06/07/18 1746)  Body mass index is 22.6 kg/m².  Body surface area is 1.72 meters squared.    I/O last 3 completed shifts:  In: 2000 [IV Piggyback:2000]  Out: 1150 [Urine:1150]    Physical Exam   Constitutional: He is oriented to person, place, and time.   Chronic ill appearing. Sitting up in bed and eating breakfast.    HENT:   Head: Atraumatic.   Eyes: EOM are normal.   Neck: Neck supple.   Cardiovascular: Normal rate and regular rhythm.    Pulmonary/Chest: Effort normal and breath sounds normal.   Diminished at bases.    Abdominal: Soft. Bowel sounds are normal. He exhibits distension. There is no tenderness.   Musculoskeletal: He exhibits no edema.   Neurological: He is oriented to person, place, and time.   Skin: Skin is warm and dry.   CUAUHTEMOC fung    Psychiatric: He has a normal mood and affect.       Significant Labs:  All labs within the past 24 hours have been reviewed.    Significant Imaging:  Labs: Reviewed

## 2018-06-08 NOTE — H&P
[facility]  General Surgery  History & Physical    Patient Name: Jhonny Diana  MRN: [id number]  Admission Date: 6/7/2018  Attending Physician: Luís Green MD   Primary Care Provider: Primary Doctor No    Patient information was obtained from patient.     Subjective:     Chief Complaint/Reason for Admission: Abd pain    History of Present Illness: 28 y/o M who underwent liver Txp in Oct of 2017 present to the ER with diffuse abdominal pain and tachycardia.  Pt has recently been discharged with similar complaints.  While he has been dealing with this pain for some time, it is getting worse over the past day.  Pt has peen tolerating normal PO, urinating a good amount, and having normal bowel function.  Denies fevers or chills.  Pain is worse on the left flank.      No current facility-administered medications on file prior to encounter.      Current Outpatient Prescriptions on File Prior to Encounter   Medication Sig    amLODIPine (NORVASC) 5 MG tablet Take 1 tablet (5 mg total) by mouth once daily.    dronabinol (MARINOL) 2.5 MG capsule Take 1 capsule (2.5 mg total) by mouth every evening.    ergocalciferol (ERGOCALCIFEROL) 50,000 unit Cap Take 1 capsule (50,000 Units total) by mouth every 7 days.    famotidine (PEPCID) 20 MG tablet Take 1 tablet (20 mg total) by mouth every evening.    fluconazole (DIFLUCAN) 200 MG Tab Take 1 tablet (200 mg total) by mouth once daily. STOP 6/23/18    gabapentin (NEURONTIN) 300 MG capsule Take 2 capsules (600 mg total) by mouth every evening.    levETIRAcetam (KEPPRA) 500 MG Tab Take 1 tablet (500 mg total) by mouth 2 (two) times daily.    levothyroxine (SYNTHROID) 75 MCG tablet Take 1 tablet (75 mcg total) by mouth before breakfast.    polyethylene glycol (GLYCOLAX) 17 gram PwPk Mix 1 packet (17 g) in liquid and take by mouth 2 (two) times daily.    predniSONE (DELTASONE) 20 MG tablet Take daily by mouth daily: 60mg 6/4-6/10, 50mg (2.5 tab) 6/11-6/17,  40mg (2 tab) 6/18-6/24, 30mg (1.5 tab) 6/25-7/1, 20 mg 7/2/18    sevelamer carbonate (RENVELA) 800 mg Tab Take 2 tablets (1,600 mg total) by mouth 3 (three) times daily with meals.    sirolimus (RAPAMUNE) 1 MG Tab Take 1 tablet (1 mg total) by mouth once daily.    torsemide (DEMADEX) 20 MG Tab Take 3 tablets (60 mg total) by mouth once daily.    ursodiol (ACTIGALL) 300 mg capsule Take 1 capsule (300 mg total) by mouth 2 (two) times daily.    bisacodyl (DULCOLAX) 5 mg EC tablet Take 2 tablets (10 mg total) by mouth once daily.    docusate sodium (COLACE) 100 MG capsule Take 1 capsule (100 mg total) by mouth 2 (two) times daily.    ondansetron (ZOFRAN-ODT) 8 MG TbDL Take 1 tablet (8 mg total) by mouth every 8 (eight) hours as needed (nausea).    oxyCODONE (ROXICODONE) 10 mg Tab immediate release tablet Take 0.5-1 tablets (5-10 mg total) by mouth every 4 (four) hours as needed for Pain.    [DISCONTINUED] gabapentin (NEURONTIN) 100 MG capsule Take 3 capsules (300 mg total) by mouth every evening.       Review of patient's allergies indicates:   Allergen Reactions    Bactrim [sulfamethoxazole-trimethoprim] Other (See Comments)     Mookie Trell Syndrome       Past Medical History:   Diagnosis Date    Alcoholic hepatitis with ascites     Alcoholic hepatitis with ascites     ESRD on dialysis     History of hematemesis 9/28/2017    Hypertension     Renal disorder     Seizures      Past Surgical History:   Procedure Laterality Date    APPENDECTOMY      ERCP      ESOPHAGOGASTRODUODENOSCOPY      LIVER TRANSPLANT       Family History     Problem Relation (Age of Onset)    No Known Problems Mother        Social History Main Topics    Smoking status: Former Smoker     Packs/day: 1.00     Years: 10.00     Types: Cigarettes     Start date: 2/19/2017    Smokeless tobacco: Never Used    Alcohol use No      Comment: a fifth of liquor daily for years, cut back over last 2 months    Drug use: No    Sexual  activity: Not on file     Review of Systems     ROS notable for abdominal pain  Normal GI function per report  Denies fevers or chills  He does report abd distention, chronic and stable  Objective:     Vital Signs (Most Recent):  Temp: 98.6 °F (37 °C) (06/07/18 1746)  Pulse: 110 (06/07/18 2202)  Resp: 16 (06/07/18 2201)  BP: (!) 142/82 (06/07/18 2202)  SpO2: 100 % (06/07/18 2202) Vital Signs (24h Range):  Temp:  [98.6 °F (37 °C)] 98.6 °F (37 °C)  Pulse:  [110-133] 110  Resp:  [16-20] 16  SpO2:  [97 %-100 %] 100 %  BP: (137-155)/(82-92) 142/82     Weight: 63.5 kg (140 lb)  Body mass index is 22.6 kg/m².    Physical Exam   Gen: NAD  CV: Tachy  Pulm: unlabored  GI: Soft, Distended, voluntary guarding.  Chevron well healed.  No rebound  Neuro: Non focal  Ext: No edema    Significant Labs:  CBC:   Recent Labs  Lab 06/07/18  1808   WBC 9.78   RBC 3.97*   HGB 12.8*   HCT 39.0*   PLT 33*   MCV 98   MCH 32.2*   MCHC 32.8     CMP:   Recent Labs  Lab 06/07/18  1937   *   CALCIUM 8.1*   ALBUMIN 2.8*   PROT 6.4      K 4.5   CO2 17*      BUN 64*   CREATININE 3.2*   ALKPHOS 135   ALT 25   AST 35   BILITOT 1.1*     Coagulation:   Recent Labs  Lab 06/07/18  1808   LABPROT 10.2   INR 1.0       Significant Diagnostics:  KUB and CXR reviewed    Assessment/Plan:     Abdominal pain    Will admit to LTS    CT abd with PO contrast  NPO after CT pending results  Pt was hydrated in the ER with good response in tachycardia.  Given chronic kidney dysfunction and home lasix stability, will opt for gentle hydration overnight with IVMF  Continue Home meds  Will hold on abx and trend fever curve, WBC count and HR pending CT  Pt is not currently on Cellcept  Continue Home immunosuppression           VTE Risk Mitigation     None          Gaurav Landa MD  General Surgery  [facility]

## 2018-06-08 NOTE — ED NOTES
Pt on stretcher sleeping, chest rising and falling. Call light within reach, side rails up x2. Will continue to monitor.

## 2018-06-08 NOTE — ASSESSMENT & PLAN NOTE
- no physical changes  - relieved with po and iv pain medications  - decrease oral pain meds to oxy 5-10 mg q 4 hr

## 2018-06-08 NOTE — ASSESSMENT & PLAN NOTE
Patient is s/p DDLT 10/19/2017 complicated by ATN now dialysis dependant in iHD MF via R IJ Permacath at [facility] under the care of Dr. Peterson. Last HD 6/4 at [facility] after discharge from hospital same day. Good UOP. Cr 3.1 from 3.2 yesterday.    -Lab reviewed.  -Continue torsemide. Strict IO. Daily weight  -Start oral sodium bicarb. Hold off RRT at this time and monitor RFP/IO/wt daily   -Decrease gabapentin 300 mg   -Discussed above with primary team. Please call with any questions.

## 2018-06-08 NOTE — HPI
28 y/o M who underwent liver Txp in Oct of 2017 present to the ER with diffuse abdominal pain and tachycardia.  Pt has recently been discharged with similar complaints.  While he has been dealing with this pain for some time, reports it is getting worse over the past day.  Pt has peen tolerating normal PO, urinating a good amount, and having normal bowel function.  Denies fevers or chills.  Pain is worse on the left flank.

## 2018-06-09 LAB
ALBUMIN SERPL BCP-MCNC: 2.7 G/DL
ALP SERPL-CCNC: 113 U/L
ALT SERPL W/O P-5'-P-CCNC: 19 U/L
ANION GAP SERPL CALC-SCNC: 13 MMOL/L
ANISOCYTOSIS BLD QL SMEAR: SLIGHT
AST SERPL-CCNC: 21 U/L
BASOPHILS # BLD AUTO: 0 K/UL
BASOPHILS NFR BLD: 0 %
BILIRUB SERPL-MCNC: 0.9 MG/DL
BUN SERPL-MCNC: 77 MG/DL
BURR CELLS BLD QL SMEAR: ABNORMAL
CALCIUM SERPL-MCNC: 8.2 MG/DL
CHLORIDE SERPL-SCNC: 104 MMOL/L
CO2 SERPL-SCNC: 18 MMOL/L
CREAT SERPL-MCNC: 3.5 MG/DL
DACRYOCYTES BLD QL SMEAR: ABNORMAL
DIFFERENTIAL METHOD: ABNORMAL
EOSINOPHIL # BLD AUTO: 0 K/UL
EOSINOPHIL NFR BLD: 0.4 %
ERYTHROCYTE [DISTWIDTH] IN BLOOD BY AUTOMATED COUNT: 15.5 %
EST. GFR  (AFRICAN AMERICAN): 25.9 ML/MIN/1.73 M^2
EST. GFR  (NON AFRICAN AMERICAN): 22.4 ML/MIN/1.73 M^2
GLUCOSE SERPL-MCNC: 117 MG/DL
HCT VFR BLD AUTO: 27.9 %
HGB BLD-MCNC: 9.2 G/DL
HYPOCHROMIA BLD QL SMEAR: ABNORMAL
IMM GRANULOCYTES # BLD AUTO: 0.02 K/UL
IMM GRANULOCYTES NFR BLD AUTO: 0.4 %
INR PPP: 1
LYMPHOCYTES # BLD AUTO: 0.7 K/UL
LYMPHOCYTES NFR BLD: 14.3 %
MAGNESIUM SERPL-MCNC: 1.9 MG/DL
MCH RBC QN AUTO: 33 PG
MCHC RBC AUTO-ENTMCNC: 33 G/DL
MCV RBC AUTO: 100 FL
MONOCYTES # BLD AUTO: 0.3 K/UL
MONOCYTES NFR BLD: 6.4 %
NEUTROPHILS # BLD AUTO: 3.7 K/UL
NEUTROPHILS NFR BLD: 78.5 %
NRBC BLD-RTO: 0 /100 WBC
OVALOCYTES BLD QL SMEAR: ABNORMAL
PHOSPHATE SERPL-MCNC: 6.6 MG/DL
PLATELET # BLD AUTO: 22 K/UL
PLATELET BLD QL SMEAR: ABNORMAL
PMV BLD AUTO: 12.5 FL
POIKILOCYTOSIS BLD QL SMEAR: SLIGHT
POLYCHROMASIA BLD QL SMEAR: ABNORMAL
POTASSIUM SERPL-SCNC: 4.9 MMOL/L
PROT SERPL-MCNC: 6.2 G/DL
PROTHROMBIN TIME: 10.5 SEC
RBC # BLD AUTO: 2.79 M/UL
SCHISTOCYTES BLD QL SMEAR: PRESENT
SIROLIMUS BLD-MCNC: <2 NG/ML
SODIUM SERPL-SCNC: 135 MMOL/L
WBC # BLD AUTO: 4.67 K/UL

## 2018-06-09 PROCEDURE — 20600001 HC STEP DOWN PRIVATE ROOM: Mod: NTX

## 2018-06-09 PROCEDURE — 99233 SBSQ HOSP IP/OBS HIGH 50: CPT | Mod: NTX,,, | Performed by: NURSE PRACTITIONER

## 2018-06-09 PROCEDURE — 25000003 PHARM REV CODE 250: Mod: NTX | Performed by: NURSE PRACTITIONER

## 2018-06-09 PROCEDURE — 84100 ASSAY OF PHOSPHORUS: CPT | Mod: NTX

## 2018-06-09 PROCEDURE — 80195 ASSAY OF SIROLIMUS: CPT | Mod: NTX

## 2018-06-09 PROCEDURE — 80053 COMPREHEN METABOLIC PANEL: CPT | Mod: NTX

## 2018-06-09 PROCEDURE — 85610 PROTHROMBIN TIME: CPT | Mod: NTX

## 2018-06-09 PROCEDURE — 83735 ASSAY OF MAGNESIUM: CPT | Mod: NTX

## 2018-06-09 PROCEDURE — 63600175 PHARM REV CODE 636 W HCPCS: Mod: NTX | Performed by: STUDENT IN AN ORGANIZED HEALTH CARE EDUCATION/TRAINING PROGRAM

## 2018-06-09 PROCEDURE — 36415 COLL VENOUS BLD VENIPUNCTURE: CPT | Mod: NTX

## 2018-06-09 PROCEDURE — 85025 COMPLETE CBC W/AUTO DIFF WBC: CPT | Mod: NTX

## 2018-06-09 PROCEDURE — 25000003 PHARM REV CODE 250: Mod: NTX | Performed by: STUDENT IN AN ORGANIZED HEALTH CARE EDUCATION/TRAINING PROGRAM

## 2018-06-09 RX ORDER — PREDNISONE 10 MG/1
30 TABLET ORAL DAILY
Status: DISCONTINUED | OUTPATIENT
Start: 2018-06-25 | End: 2018-06-16 | Stop reason: HOSPADM

## 2018-06-09 RX ORDER — PREDNISONE 10 MG/1
40 TABLET ORAL DAILY
Status: DISCONTINUED | OUTPATIENT
Start: 2018-06-18 | End: 2018-06-16 | Stop reason: HOSPADM

## 2018-06-09 RX ORDER — PREDNISONE 10 MG/1
20 TABLET ORAL DAILY
Status: DISCONTINUED | OUTPATIENT
Start: 2018-07-02 | End: 2018-06-16 | Stop reason: HOSPADM

## 2018-06-09 RX ORDER — PREDNISONE 10 MG/1
60 TABLET ORAL DAILY
Status: COMPLETED | OUTPATIENT
Start: 2018-06-10 | End: 2018-06-10

## 2018-06-09 RX ORDER — PREDNISONE 10 MG/1
50 TABLET ORAL DAILY
Status: DISCONTINUED | OUTPATIENT
Start: 2018-06-11 | End: 2018-06-16 | Stop reason: HOSPADM

## 2018-06-09 RX ADMIN — SEVELAMER CARBONATE 1600 MG: 800 TABLET, FILM COATED ORAL at 12:06

## 2018-06-09 RX ADMIN — TORSEMIDE 60 MG: 20 TABLET ORAL at 08:06

## 2018-06-09 RX ADMIN — URSODIOL 300 MG: 300 CAPSULE ORAL at 08:06

## 2018-06-09 RX ADMIN — OXYCODONE HYDROCHLORIDE 10 MG: 5 TABLET ORAL at 08:06

## 2018-06-09 RX ADMIN — DOCUSATE SODIUM 100 MG: 100 CAPSULE ORAL at 08:06

## 2018-06-09 RX ADMIN — SIROLIMUS 1 MG: 1 TABLET, SUGAR COATED ORAL at 08:06

## 2018-06-09 RX ADMIN — FLUCONAZOLE 200 MG: 200 TABLET ORAL at 08:06

## 2018-06-09 RX ADMIN — BISACODYL 10 MG: 5 TABLET, COATED ORAL at 08:06

## 2018-06-09 RX ADMIN — LEVETIRACETAM 500 MG: 500 TABLET ORAL at 08:06

## 2018-06-09 RX ADMIN — POLYETHYLENE GLYCOL 3350 17 G: 17 POWDER, FOR SOLUTION ORAL at 08:06

## 2018-06-09 RX ADMIN — SODIUM BICARBONATE 650 MG TABLET 650 MG: at 08:06

## 2018-06-09 RX ADMIN — OXYCODONE HYDROCHLORIDE 10 MG: 5 TABLET ORAL at 05:06

## 2018-06-09 RX ADMIN — SEVELAMER CARBONATE 1600 MG: 800 TABLET, FILM COATED ORAL at 08:06

## 2018-06-09 RX ADMIN — DRONABINOL 2.5 MG: 2.5 CAPSULE ORAL at 08:06

## 2018-06-09 RX ADMIN — OXYCODONE HYDROCHLORIDE 10 MG: 5 TABLET ORAL at 02:06

## 2018-06-09 RX ADMIN — OXYCODONE HYDROCHLORIDE 10 MG: 5 TABLET ORAL at 12:06

## 2018-06-09 RX ADMIN — GABAPENTIN 300 MG: 300 CAPSULE ORAL at 08:06

## 2018-06-09 RX ADMIN — FAMOTIDINE 20 MG: 20 TABLET ORAL at 08:06

## 2018-06-09 RX ADMIN — OXYCODONE HYDROCHLORIDE 10 MG: 5 TABLET ORAL at 07:06

## 2018-06-09 RX ADMIN — LEVOTHYROXINE SODIUM 75 MCG: 75 TABLET ORAL at 05:06

## 2018-06-09 RX ADMIN — AMLODIPINE BESYLATE 5 MG: 5 TABLET ORAL at 08:06

## 2018-06-09 RX ADMIN — PREDNISONE 60 MG: 20 TABLET ORAL at 08:06

## 2018-06-09 RX ADMIN — SEVELAMER CARBONATE 1600 MG: 800 TABLET, FILM COATED ORAL at 05:06

## 2018-06-09 NOTE — ASSESSMENT & PLAN NOTE
- nephrology consulted.  - chronic hd mon/friday.  - nephrology does not feel HD necessary 6/8, will plan for HD Monday.

## 2018-06-09 NOTE — ASSESSMENT & PLAN NOTE
- no physical changes.  - relieved with po and iv pain medications.  - decrease oral pain meds to oxy 5-10 mg q 4 hr.

## 2018-06-09 NOTE — PLAN OF CARE
Problem: Patient Care Overview  Goal: Plan of Care Review  Outcome: Ongoing (interventions implemented as appropriate)  Pt has call bell in reach, non slip socks on, and bedrails up x2. Pt has prn nausea and pain meds. Pt encouraged to wash hands.

## 2018-06-09 NOTE — PROGRESS NOTES
[facility]  Liver Transplant  Progress Note    Patient Name: Jhonny Diana  MRN: [id number]  Admission Date: 2018  Hospital Length of Stay: 2 days  Code Status: Full Code  Primary Care Provider: Primary Doctor No  Post-Operative Day: 233    ORGAN:   LIVER  Disease Etiology: Acute Alcoholic Hepatitis  Donor Type:    - Brain Death  CDC High Risk:   No  Donor CMV Status:   Donor CMV Status: Positive  Donor HBcAB:   Negative  Donor HCV Status:   Negative  Whole or Partial: Whole Liver  Biliary Anastomosis: End to End  Arterial Anatomy: Standard  Subjective:     History of Present Illness:  28 y/o M who underwent liver Txp in Oct of 2017 present to the ER with diffuse abdominal pain and tachycardia.  Pt has recently been discharged with similar complaints.  While he has been dealing with this pain for some time, reports it is getting worse over the past day.  Pt has peen tolerating normal PO, urinating a good amount, and having normal bowel function.  Denies fevers or chills.  Pain is worse on the left flank.       Hospital Course:  Interval history: pt feeling ok. Minimal pain noted. Paracentesis  negative for infection. Wbc 12, segs 1%. CT reviewed by staff. No acute findings to explain pain. Pain management discussed pain block with pt. Team ok with decision. Nephrology following. Next HD Monday. Monitor.     Scheduled Meds:   amLODIPine  5 mg Oral Daily    bisacodyl  10 mg Oral Daily    docusate sodium  100 mg Oral BID    dronabinol  2.5 mg Oral QHS    famotidine  20 mg Oral QHS    fluconazole  200 mg Oral Daily    gabapentin  300 mg Oral QHS    heparin (porcine)  5,000 Units Subcutaneous Q8H    levETIRAcetam  500 mg Oral BID    levothyroxine  75 mcg Oral Before breakfast    polyethylene glycol  17 g Oral BID    [START ON 2018] predniSONE  50 mg Oral Daily    Followed by    [START ON 2018] predniSONE  40 mg Oral Daily    Followed by    [START ON 2018]  predniSONE  30 mg Oral Daily    Followed by    [START ON 7/2/2018] predniSONE  20 mg Oral Daily    [START ON 6/10/2018] predniSONE  60 mg Oral Daily    sevelamer carbonate  1,600 mg Oral TID WM    sirolimus  1 mg Oral Daily    sodium bicarbonate  1 tablet Oral BID    torsemide  60 mg Oral Daily    ursodiol  300 mg Oral BID     Continuous Infusions:  PRN Meds:acetaminophen, oxyCODONE, oxyCODONE, ramelteon    Review of Systems   Constitutional: Positive for fatigue. Negative for activity change, appetite change and chills.   HENT: Negative.    Eyes: Negative.    Respiratory: Negative for chest tightness and shortness of breath.    Cardiovascular: Negative for palpitations and leg swelling.   Gastrointestinal: Positive for abdominal distention, abdominal pain and constipation. Negative for nausea and vomiting.   Genitourinary: Negative.    Musculoskeletal: Positive for back pain. Negative for arthralgias.   Skin: Negative.    Allergic/Immunologic: Positive for immunocompromised state.   Neurological: Negative.    Psychiatric/Behavioral: Positive for sleep disturbance. Negative for confusion and decreased concentration. The patient is nervous/anxious.      Objective:     Vital Signs (Most Recent):  Temp: 98.5 °F (36.9 °C) (06/09/18 1123)  Pulse: 102 (06/09/18 1123)  Resp: 16 (06/09/18 1123)  BP: 116/62 (06/09/18 1123)  SpO2: 98 % (06/09/18 1123) Vital Signs (24h Range):  Temp:  [97.9 °F (36.6 °C)-98.7 °F (37.1 °C)] 98.5 °F (36.9 °C)  Pulse:  [] 102  Resp:  [11-21] 16  SpO2:  [98 %-100 %] 98 %  BP: (116-135)/(62-84) 116/62     Weight: 63 kg (138 lb 14.2 oz)  Body mass index is 22.42 kg/m².    Intake/Output - Last 3 Shifts       06/07 0700 - 06/08 0659 06/08 0700 - 06/09 0659 06/09 0700 - 06/10 0659    P.O.  480 240    I.V. (mL/kg)  1000 (15.9)     IV Piggyback 2000      Total Intake(mL/kg) 2000 (31.5) 1480 (23.5) 240 (3.8)    Urine (mL/kg/hr) 800 2590 (1.7) 580 (1.6)    Total Output 800 2590 580    Net +1200  -1110 -340           Stool Occurrence  1 x           Physical Exam   Constitutional: He is oriented to person, place, and time. Vital signs are normal. He appears well-developed.   Chronic ill appearing    HENT:   Head: Normocephalic and atraumatic.   Right Ear: Hearing, tympanic membrane and ear canal normal.   Left Ear: Hearing, tympanic membrane and ear canal normal.   Nose: Nose normal.   Mouth/Throat: Uvula is midline.   Eyes: Pupils are equal, round, and reactive to light.   Neck: Trachea normal, normal range of motion and full passive range of motion without pain. No JVD present.   Cardiovascular: Normal rate, regular rhythm, normal heart sounds, intact distal pulses and normal pulses.    No murmur heard.  Pulmonary/Chest: Effort normal and breath sounds normal. No respiratory distress. He has no wheezes. He has no rales. He exhibits no tenderness.   Diminished at bases.    Abdominal: Soft. Normal appearance and bowel sounds are normal. He exhibits distension. He exhibits no ascites. There is tenderness. There is no rigidity, no rebound, no guarding and no CVA tenderness.   Musculoskeletal: Normal range of motion. He exhibits edema (BLE ). He exhibits no tenderness.   Neurological: He is alert and oriented to person, place, and time. He has normal reflexes.   Skin: Skin is warm and dry.   R IJ permacath   Psychiatric: He has a normal mood and affect. His speech is normal and behavior is normal. Judgment and thought content normal. Cognition and memory are normal.   Nursing note and vitals reviewed.      Laboratory:  Immunosuppressants         Stop Route Frequency     sirolimus tablet 1 mg      -- Oral Daily        CBC:   Recent Labs  Lab 06/09/18  0435   WBC 4.67   RBC 2.79*   HGB 9.2*   HCT 27.9*   PLT 22*   *   MCH 33.0*   MCHC 33.0     CMP:   Recent Labs  Lab 06/09/18  0435   *   CALCIUM 8.2*   ALBUMIN 2.7*   PROT 6.2   *   K 4.9   CO2 18*      BUN 77*   CREATININE 3.5*   ALKPHOS  113   ALT 19   AST 21   BILITOT 0.9     Labs within the past 24 hours have been reviewed.    Diagnostic Results:  I have personally reviewed all pertinent imaging studies.    Assessment/Plan:     * Abdominal pain    - no physical changes.  - relieved with po and iv pain medications.  - decrease oral pain meds to oxy 5-10 mg q 4 hr.          S/P liver transplant    - LFTs stable.  - bilirubin stable.        Severe protein-calorie malnutrition    - encourage po intake.  - appetite improving.             ESRD Monday/Friday    - nephrology consulted.  - chronic hd mon/friday.  - nephrology does not feel HD necessary 6/8, will plan for HD Monday.          Anemia of chronic renal failure    - H&H stable.           Ascites due to alcoholic cirrhosis    - diagnostic paracentesis 6/8.   - negative for infection. Wbc 12, segs 1%.   - monitor              VTE Risk Mitigation         Ordered     heparin (porcine) injection 5,000 Units  Every 8 hours      06/07/18 2252     IP VTE HIGH RISK PATIENT  Once      06/07/18 2252     Place sequential compression device  Until discontinued      06/07/18 2252          The patients clinical status was discussed at multidisplinary rounds, involving transplant surgery, transplant medicine, pharmacy, nursing, nutrition, and social work    Discharge Planning: not a candidate for dc at this time.   Monitor kidney function --> may need outpt HD  Monitor HH needs vs rehab for deconditioned status      Mandy Crespo NP  Liver Transplant  [facility]

## 2018-06-09 NOTE — PLAN OF CARE
Problem: Patient Care Overview  Goal: Plan of Care Review  Outcome: Ongoing (interventions implemented as appropriate)  Pt aao x 4. Pt currently in bed with bed in lowest/locked position. Call light in reach. Free from falls/injury this shift and wearing non-skid footwear when ambulating. Encouraged to call for assistance when needed, has verbalized understanding. Pt has ambulated hallway. Mother at bedside. Pt receiving PO oxy 10 mg q 4 hours. Pt measuring u/o, see flowsheets for I/Os documentation. Pt has had BM today. Pt eating % of meals.     Will continue to monitor, assess, and adjust care as needed.

## 2018-06-09 NOTE — SUBJECTIVE & OBJECTIVE
Scheduled Meds:   amLODIPine  5 mg Oral Daily    bisacodyl  10 mg Oral Daily    docusate sodium  100 mg Oral BID    dronabinol  2.5 mg Oral QHS    famotidine  20 mg Oral QHS    fluconazole  200 mg Oral Daily    gabapentin  300 mg Oral QHS    heparin (porcine)  5,000 Units Subcutaneous Q8H    levETIRAcetam  500 mg Oral BID    levothyroxine  75 mcg Oral Before breakfast    polyethylene glycol  17 g Oral BID    [START ON 6/11/2018] predniSONE  50 mg Oral Daily    Followed by    [START ON 6/18/2018] predniSONE  40 mg Oral Daily    Followed by    [START ON 6/25/2018] predniSONE  30 mg Oral Daily    Followed by    [START ON 7/2/2018] predniSONE  20 mg Oral Daily    [START ON 6/10/2018] predniSONE  60 mg Oral Daily    sevelamer carbonate  1,600 mg Oral TID WM    sirolimus  1 mg Oral Daily    sodium bicarbonate  1 tablet Oral BID    torsemide  60 mg Oral Daily    ursodiol  300 mg Oral BID     Continuous Infusions:  PRN Meds:acetaminophen, oxyCODONE, oxyCODONE, ramelteon    Review of Systems   Constitutional: Positive for fatigue. Negative for activity change, appetite change and chills.   HENT: Negative.    Eyes: Negative.    Respiratory: Negative for chest tightness and shortness of breath.    Cardiovascular: Negative for palpitations and leg swelling.   Gastrointestinal: Positive for abdominal distention, abdominal pain and constipation. Negative for nausea and vomiting.   Genitourinary: Negative.    Musculoskeletal: Positive for back pain. Negative for arthralgias.   Skin: Negative.    Allergic/Immunologic: Positive for immunocompromised state.   Neurological: Negative.    Psychiatric/Behavioral: Positive for sleep disturbance. Negative for confusion and decreased concentration. The patient is nervous/anxious.      Objective:     Vital Signs (Most Recent):  Temp: 98.5 °F (36.9 °C) (06/09/18 1123)  Pulse: 102 (06/09/18 1123)  Resp: 16 (06/09/18 1123)  BP: 116/62 (06/09/18 1123)  SpO2: 98 % (06/09/18  1123) Vital Signs (24h Range):  Temp:  [97.9 °F (36.6 °C)-98.7 °F (37.1 °C)] 98.5 °F (36.9 °C)  Pulse:  [] 102  Resp:  [11-21] 16  SpO2:  [98 %-100 %] 98 %  BP: (116-135)/(62-84) 116/62     Weight: 63 kg (138 lb 14.2 oz)  Body mass index is 22.42 kg/m².    Intake/Output - Last 3 Shifts       06/07 0700 - 06/08 0659 06/08 0700 - 06/09 0659 06/09 0700 - 06/10 0659    P.O.  480 240    I.V. (mL/kg)  1000 (15.9)     IV Piggyback 2000      Total Intake(mL/kg) 2000 (31.5) 1480 (23.5) 240 (3.8)    Urine (mL/kg/hr) 800 2590 (1.7) 580 (1.6)    Total Output 800 2590 580    Net +1200 -1110 -340           Stool Occurrence  1 x           Physical Exam   Constitutional: He is oriented to person, place, and time. Vital signs are normal. He appears well-developed.   Chronic ill appearing    HENT:   Head: Normocephalic and atraumatic.   Right Ear: Hearing, tympanic membrane and ear canal normal.   Left Ear: Hearing, tympanic membrane and ear canal normal.   Nose: Nose normal.   Mouth/Throat: Uvula is midline.   Eyes: Pupils are equal, round, and reactive to light.   Neck: Trachea normal, normal range of motion and full passive range of motion without pain. No JVD present.   Cardiovascular: Normal rate, regular rhythm, normal heart sounds, intact distal pulses and normal pulses.    No murmur heard.  Pulmonary/Chest: Effort normal and breath sounds normal. No respiratory distress. He has no wheezes. He has no rales. He exhibits no tenderness.   Diminished at bases.    Abdominal: Soft. Normal appearance and bowel sounds are normal. He exhibits distension. He exhibits no ascites. There is tenderness. There is no rigidity, no rebound, no guarding and no CVA tenderness.   Musculoskeletal: Normal range of motion. He exhibits edema (BLE ). He exhibits no tenderness.   Neurological: He is alert and oriented to person, place, and time. He has normal reflexes.   Skin: Skin is warm and dry.   R IJ permacath   Psychiatric: He has a normal  mood and affect. His speech is normal and behavior is normal. Judgment and thought content normal. Cognition and memory are normal.   Nursing note and vitals reviewed.      Laboratory:  Immunosuppressants         Stop Route Frequency     sirolimus tablet 1 mg      -- Oral Daily        CBC:   Recent Labs  Lab 06/09/18  0435   WBC 4.67   RBC 2.79*   HGB 9.2*   HCT 27.9*   PLT 22*   *   MCH 33.0*   MCHC 33.0     CMP:   Recent Labs  Lab 06/09/18  0435   *   CALCIUM 8.2*   ALBUMIN 2.7*   PROT 6.2   *   K 4.9   CO2 18*      BUN 77*   CREATININE 3.5*   ALKPHOS 113   ALT 19   AST 21   BILITOT 0.9     Labs within the past 24 hours have been reviewed.    Diagnostic Results:  I have personally reviewed all pertinent imaging studies.

## 2018-06-10 LAB
ALBUMIN SERPL BCP-MCNC: 2.9 G/DL
ALP SERPL-CCNC: 129 U/L
ALT SERPL W/O P-5'-P-CCNC: 17 U/L
ANION GAP SERPL CALC-SCNC: 13 MMOL/L
AST SERPL-CCNC: 17 U/L
BASOPHILS # BLD AUTO: 0 K/UL
BASOPHILS NFR BLD: 0 %
BILIRUB SERPL-MCNC: 0.9 MG/DL
BUN SERPL-MCNC: 89 MG/DL
CALCIUM SERPL-MCNC: 8.6 MG/DL
CHLORIDE SERPL-SCNC: 104 MMOL/L
CO2 SERPL-SCNC: 22 MMOL/L
CREAT SERPL-MCNC: 3.8 MG/DL
DIFFERENTIAL METHOD: ABNORMAL
EOSINOPHIL # BLD AUTO: 0 K/UL
EOSINOPHIL NFR BLD: 0 %
ERYTHROCYTE [DISTWIDTH] IN BLOOD BY AUTOMATED COUNT: 15.3 %
EST. GFR  (AFRICAN AMERICAN): 23.5 ML/MIN/1.73 M^2
EST. GFR  (NON AFRICAN AMERICAN): 20.3 ML/MIN/1.73 M^2
GLUCOSE SERPL-MCNC: 126 MG/DL
HCT VFR BLD AUTO: 27 %
HGB BLD-MCNC: 9 G/DL
IMM GRANULOCYTES # BLD AUTO: 0.04 K/UL
IMM GRANULOCYTES NFR BLD AUTO: 0.7 %
INR PPP: 1
LYMPHOCYTES # BLD AUTO: 0.6 K/UL
LYMPHOCYTES NFR BLD: 9.7 %
MAGNESIUM SERPL-MCNC: 2.1 MG/DL
MCH RBC QN AUTO: 32.5 PG
MCHC RBC AUTO-ENTMCNC: 33.3 G/DL
MCV RBC AUTO: 98 FL
MONOCYTES # BLD AUTO: 0.4 K/UL
MONOCYTES NFR BLD: 6.4 %
NEUTROPHILS # BLD AUTO: 4.8 K/UL
NEUTROPHILS NFR BLD: 83.2 %
NRBC BLD-RTO: 0 /100 WBC
PHOSPHATE SERPL-MCNC: 6.1 MG/DL
PLATELET # BLD AUTO: 27 K/UL
PMV BLD AUTO: 11.7 FL
POTASSIUM SERPL-SCNC: 4.2 MMOL/L
PROT SERPL-MCNC: 6.3 G/DL
PROTHROMBIN TIME: 10.2 SEC
RBC # BLD AUTO: 2.77 M/UL
SIROLIMUS BLD-MCNC: 3.1 NG/ML
SODIUM SERPL-SCNC: 139 MMOL/L
WBC # BLD AUTO: 5.78 K/UL

## 2018-06-10 PROCEDURE — 83735 ASSAY OF MAGNESIUM: CPT | Mod: NTX

## 2018-06-10 PROCEDURE — 20600001 HC STEP DOWN PRIVATE ROOM: Mod: NTX

## 2018-06-10 PROCEDURE — 63600175 PHARM REV CODE 636 W HCPCS: Mod: NTX | Performed by: NURSE PRACTITIONER

## 2018-06-10 PROCEDURE — 85610 PROTHROMBIN TIME: CPT | Mod: NTX

## 2018-06-10 PROCEDURE — 25000003 PHARM REV CODE 250: Mod: NTX | Performed by: STUDENT IN AN ORGANIZED HEALTH CARE EDUCATION/TRAINING PROGRAM

## 2018-06-10 PROCEDURE — 80053 COMPREHEN METABOLIC PANEL: CPT | Mod: NTX

## 2018-06-10 PROCEDURE — 84100 ASSAY OF PHOSPHORUS: CPT | Mod: NTX

## 2018-06-10 PROCEDURE — 63600175 PHARM REV CODE 636 W HCPCS: Mod: NTX | Performed by: STUDENT IN AN ORGANIZED HEALTH CARE EDUCATION/TRAINING PROGRAM

## 2018-06-10 PROCEDURE — 99233 SBSQ HOSP IP/OBS HIGH 50: CPT | Mod: NTX,,, | Performed by: NURSE PRACTITIONER

## 2018-06-10 PROCEDURE — 80195 ASSAY OF SIROLIMUS: CPT | Mod: NTX

## 2018-06-10 PROCEDURE — 85025 COMPLETE CBC W/AUTO DIFF WBC: CPT | Mod: NTX

## 2018-06-10 PROCEDURE — 36415 COLL VENOUS BLD VENIPUNCTURE: CPT | Mod: NTX

## 2018-06-10 PROCEDURE — 25000003 PHARM REV CODE 250: Mod: NTX | Performed by: NURSE PRACTITIONER

## 2018-06-10 RX ADMIN — POLYETHYLENE GLYCOL 3350 17 G: 17 POWDER, FOR SOLUTION ORAL at 09:06

## 2018-06-10 RX ADMIN — BISACODYL 10 MG: 5 TABLET, COATED ORAL at 08:06

## 2018-06-10 RX ADMIN — SODIUM BICARBONATE 650 MG TABLET 650 MG: at 08:06

## 2018-06-10 RX ADMIN — SODIUM BICARBONATE 650 MG TABLET 650 MG: at 09:06

## 2018-06-10 RX ADMIN — AMLODIPINE BESYLATE 5 MG: 5 TABLET ORAL at 08:06

## 2018-06-10 RX ADMIN — SIROLIMUS 1 MG: 1 TABLET, SUGAR COATED ORAL at 08:06

## 2018-06-10 RX ADMIN — LEVETIRACETAM 500 MG: 500 TABLET ORAL at 08:06

## 2018-06-10 RX ADMIN — OXYCODONE HYDROCHLORIDE 10 MG: 5 TABLET ORAL at 09:06

## 2018-06-10 RX ADMIN — OXYCODONE HYDROCHLORIDE 10 MG: 5 TABLET ORAL at 05:06

## 2018-06-10 RX ADMIN — TORSEMIDE 60 MG: 20 TABLET ORAL at 08:06

## 2018-06-10 RX ADMIN — GABAPENTIN 300 MG: 300 CAPSULE ORAL at 09:06

## 2018-06-10 RX ADMIN — URSODIOL 300 MG: 300 CAPSULE ORAL at 09:06

## 2018-06-10 RX ADMIN — DRONABINOL 2.5 MG: 2.5 CAPSULE ORAL at 09:06

## 2018-06-10 RX ADMIN — OXYCODONE HYDROCHLORIDE 10 MG: 5 TABLET ORAL at 12:06

## 2018-06-10 RX ADMIN — LEVETIRACETAM 500 MG: 500 TABLET ORAL at 09:06

## 2018-06-10 RX ADMIN — PREDNISONE 60 MG: 10 TABLET ORAL at 08:06

## 2018-06-10 RX ADMIN — DOCUSATE SODIUM 100 MG: 100 CAPSULE ORAL at 09:06

## 2018-06-10 RX ADMIN — OXYCODONE HYDROCHLORIDE 10 MG: 5 TABLET ORAL at 02:06

## 2018-06-10 RX ADMIN — POLYETHYLENE GLYCOL 3350 17 G: 17 POWDER, FOR SOLUTION ORAL at 08:06

## 2018-06-10 RX ADMIN — OXYCODONE HYDROCHLORIDE 10 MG: 5 TABLET ORAL at 08:06

## 2018-06-10 RX ADMIN — DOCUSATE SODIUM 100 MG: 100 CAPSULE ORAL at 08:06

## 2018-06-10 RX ADMIN — LEVOTHYROXINE SODIUM 75 MCG: 75 TABLET ORAL at 05:06

## 2018-06-10 RX ADMIN — SEVELAMER CARBONATE 1600 MG: 800 TABLET, FILM COATED ORAL at 12:06

## 2018-06-10 RX ADMIN — FLUCONAZOLE 200 MG: 200 TABLET ORAL at 08:06

## 2018-06-10 RX ADMIN — URSODIOL 300 MG: 300 CAPSULE ORAL at 08:06

## 2018-06-10 NOTE — PROGRESS NOTES
[facility]  Liver Transplant  Progress Note    Patient Name: Jhonny Diana  MRN: [id number]  Admission Date: 2018  Hospital Length of Stay: 3 days  Code Status: Full Code  Primary Care Provider: Primary Doctor No  Post-Operative Day: 234    ORGAN:   LIVER  Disease Etiology: Acute Alcoholic Hepatitis  Donor Type:    - Brain Death  CDC High Risk:   No  Donor CMV Status:   Donor CMV Status: Positive  Donor HBcAB:   Negative  Donor HCV Status:   Negative  Whole or Partial: Whole Liver  Biliary Anastomosis: End to End  Arterial Anatomy: Standard  Subjective:     History of Present Illness:  28 y/o M who underwent liver Txp in Oct of 2017 present to the ER with diffuse abdominal pain and tachycardia.  Pt has recently been discharged with similar complaints.  While he has been dealing with this pain for some time, reports it is getting worse over the past day.  Pt has peen tolerating normal PO, urinating a good amount, and having normal bowel function.  Denies fevers or chills.  Pain is worse on the left flank.       Hospital Course:  Interval history: no acute events overnight. He continues to have abdominal pain. Paracentesis  negative for infection. Wbc 12, segs 1%. CT reviewed by staff. No acute findings to explain pain. Pain management discussed pain block with pt. Team ok with decision. Nephrology following. Next HD Monday. Monitor.     Scheduled Meds:   amLODIPine  5 mg Oral Daily    bisacodyl  10 mg Oral Daily    docusate sodium  100 mg Oral BID    dronabinol  2.5 mg Oral QHS    famotidine  20 mg Oral QHS    fluconazole  200 mg Oral Daily    gabapentin  300 mg Oral QHS    heparin (porcine)  5,000 Units Subcutaneous Q8H    levETIRAcetam  500 mg Oral BID    levothyroxine  75 mcg Oral Before breakfast    polyethylene glycol  17 g Oral BID    [START ON 2018] predniSONE  50 mg Oral Daily    Followed by    [START ON 2018] predniSONE  40 mg Oral Daily    Followed by     [START ON 6/25/2018] predniSONE  30 mg Oral Daily    Followed by    [START ON 7/2/2018] predniSONE  20 mg Oral Daily    sevelamer carbonate  1,600 mg Oral TID WM    sirolimus  1 mg Oral Daily    sodium bicarbonate  1 tablet Oral BID    torsemide  60 mg Oral Daily    ursodiol  300 mg Oral BID     Continuous Infusions:  PRN Meds:acetaminophen, oxyCODONE, oxyCODONE, ramelteon    Review of Systems   Constitutional: Positive for fatigue. Negative for activity change, appetite change, chills and fever.   HENT: Negative.    Eyes: Negative.    Respiratory: Negative for cough, chest tightness, shortness of breath and wheezing.    Cardiovascular: Negative for chest pain, palpitations and leg swelling.   Gastrointestinal: Positive for abdominal distention, abdominal pain and constipation. Negative for nausea and vomiting.   Genitourinary: Negative.    Musculoskeletal: Positive for back pain. Negative for arthralgias.   Skin: Negative.  Negative for wound.   Allergic/Immunologic: Positive for immunocompromised state.   Neurological: Negative.    Psychiatric/Behavioral: Positive for sleep disturbance. Negative for confusion and decreased concentration. The patient is nervous/anxious.      Objective:     Vital Signs (Most Recent):  Temp: 98.8 °F (37.1 °C) (06/10/18 0833)  Pulse: 104 (06/10/18 0833)  Resp: 16 (06/10/18 0833)  BP: 139/79 (06/10/18 0833)  SpO2: 99 % (06/10/18 0833) Vital Signs (24h Range):  Temp:  [97.9 °F (36.6 °C)-98.8 °F (37.1 °C)] 98.8 °F (37.1 °C)  Pulse:  [] 104  Resp:  [15-17] 16  SpO2:  [96 %-100 %] 99 %  BP: (128-152)/(72-79) 139/79     Weight: 63.2 kg (139 lb 5.3 oz)  Body mass index is 22.49 kg/m².    Intake/Output - Last 3 Shifts       06/08 0700 - 06/09 0659 06/09 0700 - 06/10 0659 06/10 0700 - 06/11 0659    P.O. 480 800     I.V. (mL/kg) 1000 (15.9)      Total Intake(mL/kg) 1480 (23.5) 800 (12.7)     Urine (mL/kg/hr) 2590 (1.7) 2550 (1.7) 1140 (3.2)    Total Output 2590 2550 1140    Net  -1110 -1750 -1140           Stool Occurrence 1 x 1 x           Physical Exam   Constitutional: He is oriented to person, place, and time. Vital signs are normal. He appears well-developed.   Chronic ill appearing    HENT:   Head: Normocephalic and atraumatic.   Right Ear: Hearing, tympanic membrane and ear canal normal.   Left Ear: Hearing, tympanic membrane and ear canal normal.   Nose: Nose normal.   Mouth/Throat: Uvula is midline.   Eyes: Pupils are equal, round, and reactive to light.   Neck: Trachea normal, normal range of motion and full passive range of motion without pain. No JVD present.   Cardiovascular: Normal rate, regular rhythm, normal heart sounds, intact distal pulses and normal pulses.    No murmur heard.  Pulmonary/Chest: Effort normal and breath sounds normal. No respiratory distress. He has no wheezes. He has no rales. He exhibits no tenderness.   Diminished at bases.    Abdominal: Soft. Normal appearance and bowel sounds are normal. He exhibits distension. He exhibits no ascites. There is tenderness. There is no rigidity, no rebound, no guarding and no CVA tenderness.   Musculoskeletal: Normal range of motion. He exhibits edema (BLE ). He exhibits no tenderness.   Neurological: He is alert and oriented to person, place, and time. He has normal reflexes.   Skin: Skin is warm and dry.   R IJ permacath   Psychiatric: He has a normal mood and affect. His speech is normal and behavior is normal. Judgment and thought content normal. Cognition and memory are normal.   Nursing note and vitals reviewed.      Laboratory:  Immunosuppressants         Stop Route Frequency     sirolimus tablet 1 mg      -- Oral Daily        CBC:   Recent Labs  Lab 06/10/18  0452   WBC 5.78   RBC 2.77*   HGB 9.0*   HCT 27.0*   PLT 27*   MCV 98   MCH 32.5*   MCHC 33.3     CMP:   Recent Labs  Lab 06/10/18  0451   *   CALCIUM 8.6*   ALBUMIN 2.9*   PROT 6.3      K 4.2   CO2 22*      BUN 89*   CREATININE 3.8*    ALKPHOS 129   ALT 17   AST 17   BILITOT 0.9     Labs within the past 24 hours have been reviewed.    Diagnostic Results:  I have personally reviewed all pertinent imaging studies.    Assessment/Plan:     * Abdominal pain    - no physical changes.  - relieved with po and iv pain medications.  - decrease oral pain meds to oxy 5-10 mg q 4 hr.          Liver transplanted    - LFTs stable.  - bilirubin stable.        Severe protein-calorie malnutrition    - encourage po intake.  - appetite improving.             ESRD Monday/Friday    - nephrology consulted.  - chronic hd mon/friday.  - nephrology does not feel HD necessary 6/8, will plan for HD Monday.          Anemia of chronic renal failure    - H&H stable.           Ascites due to alcoholic cirrhosis    - diagnostic paracentesis 6/8.   - negative for infection. Wbc 12, segs 1%.   - monitor.              VTE Risk Mitigation         Ordered     heparin (porcine) injection 5,000 Units  Every 8 hours      06/07/18 2252     IP VTE HIGH RISK PATIENT  Once      06/07/18 2252     Place sequential compression device  Until discontinued      06/07/18 2252          The patients clinical status was discussed at multidisplinary rounds, involving transplant surgery, transplant medicine, pharmacy, nursing, nutrition, and social work    Discharge Planning: not a candidate for dc at this time.   Monitor kidney function --> may need outpt HD  Monitor HH needs vs rehab for deconditioned status      Mandy Crespo NP  Liver Transplant  [facility]

## 2018-06-10 NOTE — SUBJECTIVE & OBJECTIVE
Scheduled Meds:   amLODIPine  5 mg Oral Daily    bisacodyl  10 mg Oral Daily    docusate sodium  100 mg Oral BID    dronabinol  2.5 mg Oral QHS    famotidine  20 mg Oral QHS    fluconazole  200 mg Oral Daily    gabapentin  300 mg Oral QHS    heparin (porcine)  5,000 Units Subcutaneous Q8H    levETIRAcetam  500 mg Oral BID    levothyroxine  75 mcg Oral Before breakfast    polyethylene glycol  17 g Oral BID    [START ON 6/11/2018] predniSONE  50 mg Oral Daily    Followed by    [START ON 6/18/2018] predniSONE  40 mg Oral Daily    Followed by    [START ON 6/25/2018] predniSONE  30 mg Oral Daily    Followed by    [START ON 7/2/2018] predniSONE  20 mg Oral Daily    sevelamer carbonate  1,600 mg Oral TID WM    sirolimus  1 mg Oral Daily    sodium bicarbonate  1 tablet Oral BID    torsemide  60 mg Oral Daily    ursodiol  300 mg Oral BID     Continuous Infusions:  PRN Meds:acetaminophen, oxyCODONE, oxyCODONE, ramelteon    Review of Systems   Constitutional: Positive for fatigue. Negative for activity change, appetite change, chills and fever.   HENT: Negative.    Eyes: Negative.    Respiratory: Negative for cough, chest tightness, shortness of breath and wheezing.    Cardiovascular: Negative for chest pain, palpitations and leg swelling.   Gastrointestinal: Positive for abdominal distention, abdominal pain and constipation. Negative for nausea and vomiting.   Genitourinary: Negative.    Musculoskeletal: Positive for back pain. Negative for arthralgias.   Skin: Negative.  Negative for wound.   Allergic/Immunologic: Positive for immunocompromised state.   Neurological: Negative.    Psychiatric/Behavioral: Positive for sleep disturbance. Negative for confusion and decreased concentration. The patient is nervous/anxious.      Objective:     Vital Signs (Most Recent):  Temp: 98.8 °F (37.1 °C) (06/10/18 0833)  Pulse: 104 (06/10/18 0833)  Resp: 16 (06/10/18 0833)  BP: 139/79 (06/10/18 0833)  SpO2: 99 % (06/10/18  0833) Vital Signs (24h Range):  Temp:  [97.9 °F (36.6 °C)-98.8 °F (37.1 °C)] 98.8 °F (37.1 °C)  Pulse:  [] 104  Resp:  [15-17] 16  SpO2:  [96 %-100 %] 99 %  BP: (128-152)/(72-79) 139/79     Weight: 63.2 kg (139 lb 5.3 oz)  Body mass index is 22.49 kg/m².    Intake/Output - Last 3 Shifts       06/08 0700 - 06/09 0659 06/09 0700 - 06/10 0659 06/10 0700 - 06/11 0659    P.O. 480 800     I.V. (mL/kg) 1000 (15.9)      Total Intake(mL/kg) 1480 (23.5) 800 (12.7)     Urine (mL/kg/hr) 2590 (1.7) 2550 (1.7) 1140 (3.2)    Total Output 2590 2550 1140    Net -1110 -1750 -1140           Stool Occurrence 1 x 1 x           Physical Exam   Constitutional: He is oriented to person, place, and time. Vital signs are normal. He appears well-developed.   Chronic ill appearing    HENT:   Head: Normocephalic and atraumatic.   Right Ear: Hearing, tympanic membrane and ear canal normal.   Left Ear: Hearing, tympanic membrane and ear canal normal.   Nose: Nose normal.   Mouth/Throat: Uvula is midline.   Eyes: Pupils are equal, round, and reactive to light.   Neck: Trachea normal, normal range of motion and full passive range of motion without pain. No JVD present.   Cardiovascular: Normal rate, regular rhythm, normal heart sounds, intact distal pulses and normal pulses.    No murmur heard.  Pulmonary/Chest: Effort normal and breath sounds normal. No respiratory distress. He has no wheezes. He has no rales. He exhibits no tenderness.   Diminished at bases.    Abdominal: Soft. Normal appearance and bowel sounds are normal. He exhibits distension. He exhibits no ascites. There is tenderness. There is no rigidity, no rebound, no guarding and no CVA tenderness.   Musculoskeletal: Normal range of motion. He exhibits edema (BLE ). He exhibits no tenderness.   Neurological: He is alert and oriented to person, place, and time. He has normal reflexes.   Skin: Skin is warm and dry.   R IJ permacath   Psychiatric: He has a normal mood and affect.  His speech is normal and behavior is normal. Judgment and thought content normal. Cognition and memory are normal.   Nursing note and vitals reviewed.      Laboratory:  Immunosuppressants         Stop Route Frequency     sirolimus tablet 1 mg      -- Oral Daily        CBC:   Recent Labs  Lab 06/10/18  0452   WBC 5.78   RBC 2.77*   HGB 9.0*   HCT 27.0*   PLT 27*   MCV 98   MCH 32.5*   MCHC 33.3     CMP:   Recent Labs  Lab 06/10/18  0451   *   CALCIUM 8.6*   ALBUMIN 2.9*   PROT 6.3      K 4.2   CO2 22*      BUN 89*   CREATININE 3.8*   ALKPHOS 129   ALT 17   AST 17   BILITOT 0.9     Labs within the past 24 hours have been reviewed.    Diagnostic Results:  I have personally reviewed all pertinent imaging studies.

## 2018-06-10 NOTE — PLAN OF CARE
"Problem: Patient Care Overview  Goal: Plan of Care Review  Plan of care reviewed on am rounds, afrebrile, vss  Wbc 5 h/h stable Plts up 27 Cr 3.8 with last dialysis done MON, urine output good, (+) BM, denies n/v, appetite OK, abdomen remains distended with ongoing c/o abdominal pain 7/10 radiating to back, requesting Oxycodone when "due" at 4 hour intervals with min relief 5-6/10, using heat prn as well.Up ambulatory with mother, standby assist, gait steady.Refusing SQ heparin, explained for DVT prophylaxis, encouraged to ambulate in halls 4x/day, calm, quiet and coopeartive with care. Emotional support provided .      "

## 2018-06-11 LAB
ALBUMIN SERPL BCP-MCNC: 2.9 G/DL
ALP SERPL-CCNC: 124 U/L
ALT SERPL W/O P-5'-P-CCNC: 16 U/L
ANION GAP SERPL CALC-SCNC: 15 MMOL/L
ANISOCYTOSIS BLD QL SMEAR: SLIGHT
AST SERPL-CCNC: 18 U/L
BASOPHILS # BLD AUTO: 0 K/UL
BASOPHILS NFR BLD: 0 %
BILIRUB SERPL-MCNC: 1 MG/DL
BUN SERPL-MCNC: 97 MG/DL
CALCIUM SERPL-MCNC: 8.7 MG/DL
CHLORIDE SERPL-SCNC: 102 MMOL/L
CO2 SERPL-SCNC: 21 MMOL/L
CREAT SERPL-MCNC: 3.7 MG/DL
DACRYOCYTES BLD QL SMEAR: ABNORMAL
DIFFERENTIAL METHOD: ABNORMAL
EOSINOPHIL # BLD AUTO: 0 K/UL
EOSINOPHIL NFR BLD: 0 %
ERYTHROCYTE [DISTWIDTH] IN BLOOD BY AUTOMATED COUNT: 15.4 %
EST. GFR  (AFRICAN AMERICAN): 24.2 ML/MIN/1.73 M^2
EST. GFR  (NON AFRICAN AMERICAN): 21 ML/MIN/1.73 M^2
GLUCOSE SERPL-MCNC: 131 MG/DL
HCT VFR BLD AUTO: 26.4 %
HGB BLD-MCNC: 8.7 G/DL
HYPOCHROMIA BLD QL SMEAR: ABNORMAL
IMM GRANULOCYTES # BLD AUTO: 0.05 K/UL
IMM GRANULOCYTES NFR BLD AUTO: 1 %
INR PPP: 1
LYMPHOCYTES # BLD AUTO: 0.5 K/UL
LYMPHOCYTES NFR BLD: 9.5 %
MAGNESIUM SERPL-MCNC: 1.9 MG/DL
MCH RBC QN AUTO: 32.2 PG
MCHC RBC AUTO-ENTMCNC: 33 G/DL
MCV RBC AUTO: 98 FL
MONOCYTES # BLD AUTO: 0.5 K/UL
MONOCYTES NFR BLD: 8.9 %
NEUTROPHILS # BLD AUTO: 4.2 K/UL
NEUTROPHILS NFR BLD: 80.6 %
NRBC BLD-RTO: 0 /100 WBC
OVALOCYTES BLD QL SMEAR: ABNORMAL
PHOSPHATE SERPL-MCNC: 5.4 MG/DL
PLATELET # BLD AUTO: 27 K/UL
PMV BLD AUTO: 12.1 FL
POIKILOCYTOSIS BLD QL SMEAR: SLIGHT
POLYCHROMASIA BLD QL SMEAR: ABNORMAL
POTASSIUM SERPL-SCNC: 3.9 MMOL/L
PROT SERPL-MCNC: 6.1 G/DL
PROTHROMBIN TIME: 10.5 SEC
RBC # BLD AUTO: 2.7 M/UL
SODIUM SERPL-SCNC: 138 MMOL/L
WBC # BLD AUTO: 5.15 K/UL

## 2018-06-11 PROCEDURE — 25000003 PHARM REV CODE 250: Mod: NTX | Performed by: NURSE PRACTITIONER

## 2018-06-11 PROCEDURE — 25000003 PHARM REV CODE 250: Mod: NTX | Performed by: INTERNAL MEDICINE

## 2018-06-11 PROCEDURE — 36415 COLL VENOUS BLD VENIPUNCTURE: CPT | Mod: NTX

## 2018-06-11 PROCEDURE — 85610 PROTHROMBIN TIME: CPT | Mod: NTX

## 2018-06-11 PROCEDURE — 90935 HEMODIALYSIS ONE EVALUATION: CPT | Mod: NTX,,, | Performed by: INTERNAL MEDICINE

## 2018-06-11 PROCEDURE — 80053 COMPREHEN METABOLIC PANEL: CPT | Mod: NTX

## 2018-06-11 PROCEDURE — 63600175 PHARM REV CODE 636 W HCPCS: Mod: NTX | Performed by: NURSE PRACTITIONER

## 2018-06-11 PROCEDURE — 84100 ASSAY OF PHOSPHORUS: CPT | Mod: NTX

## 2018-06-11 PROCEDURE — 90935 HEMODIALYSIS ONE EVALUATION: CPT | Mod: NTX

## 2018-06-11 PROCEDURE — 63600175 PHARM REV CODE 636 W HCPCS: Mod: NTX | Performed by: STUDENT IN AN ORGANIZED HEALTH CARE EDUCATION/TRAINING PROGRAM

## 2018-06-11 PROCEDURE — 20600001 HC STEP DOWN PRIVATE ROOM: Mod: NTX

## 2018-06-11 PROCEDURE — 85025 COMPLETE CBC W/AUTO DIFF WBC: CPT | Mod: NTX

## 2018-06-11 PROCEDURE — 83735 ASSAY OF MAGNESIUM: CPT | Mod: NTX

## 2018-06-11 PROCEDURE — 63600175 PHARM REV CODE 636 W HCPCS: Mod: NTX | Performed by: INTERNAL MEDICINE

## 2018-06-11 PROCEDURE — 99233 SBSQ HOSP IP/OBS HIGH 50: CPT | Mod: NTX,,, | Performed by: NURSE PRACTITIONER

## 2018-06-11 PROCEDURE — 25000003 PHARM REV CODE 250: Mod: NTX | Performed by: STUDENT IN AN ORGANIZED HEALTH CARE EDUCATION/TRAINING PROGRAM

## 2018-06-11 RX ORDER — MORPHINE SULFATE 2 MG/ML
2 INJECTION, SOLUTION INTRAMUSCULAR; INTRAVENOUS ONCE
Status: COMPLETED | OUTPATIENT
Start: 2018-06-11 | End: 2018-06-11

## 2018-06-11 RX ORDER — SODIUM CHLORIDE 9 MG/ML
INJECTION, SOLUTION INTRAVENOUS ONCE
Status: COMPLETED | OUTPATIENT
Start: 2018-06-11 | End: 2018-06-11

## 2018-06-11 RX ORDER — GABAPENTIN 300 MG/1
300 CAPSULE ORAL 2 TIMES DAILY
Status: CANCELLED | OUTPATIENT
Start: 2018-06-11

## 2018-06-11 RX ORDER — SODIUM CHLORIDE 9 MG/ML
INJECTION, SOLUTION INTRAVENOUS
Status: DISCONTINUED | OUTPATIENT
Start: 2018-06-11 | End: 2018-06-16 | Stop reason: HOSPADM

## 2018-06-11 RX ORDER — ACETAMINOPHEN 325 MG/1
650 TABLET ORAL EVERY 8 HOURS
Status: DISCONTINUED | OUTPATIENT
Start: 2018-06-11 | End: 2018-06-16 | Stop reason: HOSPADM

## 2018-06-11 RX ORDER — VENLAFAXINE HYDROCHLORIDE 37.5 MG/1
37.5 CAPSULE, EXTENDED RELEASE ORAL DAILY
Status: DISCONTINUED | OUTPATIENT
Start: 2018-06-11 | End: 2018-06-16 | Stop reason: HOSPADM

## 2018-06-11 RX ORDER — MIRTAZAPINE 7.5 MG/1
7.5 TABLET, FILM COATED ORAL NIGHTLY
Status: CANCELLED | OUTPATIENT
Start: 2018-06-11

## 2018-06-11 RX ORDER — HEPARIN SODIUM 1000 [USP'U]/ML
1000 INJECTION, SOLUTION INTRAVENOUS; SUBCUTANEOUS
Status: DISCONTINUED | OUTPATIENT
Start: 2018-06-11 | End: 2018-06-16 | Stop reason: HOSPADM

## 2018-06-11 RX ADMIN — OXYCODONE HYDROCHLORIDE 10 MG: 5 TABLET ORAL at 07:06

## 2018-06-11 RX ADMIN — GABAPENTIN 300 MG: 300 CAPSULE ORAL at 10:06

## 2018-06-11 RX ADMIN — SODIUM CHLORIDE 300 ML: 0.9 INJECTION, SOLUTION INTRAVENOUS at 02:06

## 2018-06-11 RX ADMIN — VENLAFAXINE HYDROCHLORIDE 37.5 MG: 37.5 CAPSULE, EXTENDED RELEASE ORAL at 07:06

## 2018-06-11 RX ADMIN — POLYETHYLENE GLYCOL 3350 17 G: 17 POWDER, FOR SOLUTION ORAL at 08:06

## 2018-06-11 RX ADMIN — FLUCONAZOLE 200 MG: 200 TABLET ORAL at 08:06

## 2018-06-11 RX ADMIN — SEVELAMER CARBONATE 1600 MG: 800 TABLET, FILM COATED ORAL at 08:06

## 2018-06-11 RX ADMIN — HEPARIN SODIUM 1000 UNITS: 1000 INJECTION, SOLUTION INTRAVENOUS; SUBCUTANEOUS at 02:06

## 2018-06-11 RX ADMIN — URSODIOL 300 MG: 300 CAPSULE ORAL at 08:06

## 2018-06-11 RX ADMIN — LEVETIRACETAM 500 MG: 500 TABLET ORAL at 10:06

## 2018-06-11 RX ADMIN — POLYETHYLENE GLYCOL 3350 17 G: 17 POWDER, FOR SOLUTION ORAL at 10:06

## 2018-06-11 RX ADMIN — OXYCODONE HYDROCHLORIDE 10 MG: 5 TABLET ORAL at 02:06

## 2018-06-11 RX ADMIN — DOCUSATE SODIUM 100 MG: 100 CAPSULE ORAL at 08:06

## 2018-06-11 RX ADMIN — SIROLIMUS 1 MG: 1 TABLET, SUGAR COATED ORAL at 08:06

## 2018-06-11 RX ADMIN — TRAZODONE HYDROCHLORIDE 25 MG: 50 TABLET ORAL at 10:06

## 2018-06-11 RX ADMIN — TORSEMIDE 60 MG: 20 TABLET ORAL at 08:06

## 2018-06-11 RX ADMIN — AMLODIPINE BESYLATE 5 MG: 5 TABLET ORAL at 08:06

## 2018-06-11 RX ADMIN — MORPHINE SULFATE 2 MG: 2 INJECTION, SOLUTION INTRAMUSCULAR; INTRAVENOUS at 04:06

## 2018-06-11 RX ADMIN — URSODIOL 300 MG: 300 CAPSULE ORAL at 10:06

## 2018-06-11 RX ADMIN — BISACODYL 10 MG: 5 TABLET, COATED ORAL at 08:06

## 2018-06-11 RX ADMIN — LEVOTHYROXINE SODIUM 75 MCG: 75 TABLET ORAL at 05:06

## 2018-06-11 RX ADMIN — LEVETIRACETAM 500 MG: 500 TABLET ORAL at 08:06

## 2018-06-11 RX ADMIN — FAMOTIDINE 20 MG: 20 TABLET ORAL at 10:06

## 2018-06-11 RX ADMIN — DOCUSATE SODIUM 100 MG: 100 CAPSULE ORAL at 10:06

## 2018-06-11 RX ADMIN — ACETAMINOPHEN 650 MG: 325 TABLET, FILM COATED ORAL at 10:06

## 2018-06-11 RX ADMIN — SEVELAMER CARBONATE 1600 MG: 800 TABLET, FILM COATED ORAL at 03:06

## 2018-06-11 RX ADMIN — SODIUM BICARBONATE 650 MG TABLET 650 MG: at 10:06

## 2018-06-11 RX ADMIN — PREDNISONE 50 MG: 10 TABLET ORAL at 08:06

## 2018-06-11 RX ADMIN — SODIUM BICARBONATE 650 MG TABLET 650 MG: at 08:06

## 2018-06-11 NOTE — SUBJECTIVE & OBJECTIVE
Scheduled Meds:   sodium chloride 0.9%   Intravenous Once    amLODIPine  5 mg Oral Daily    bisacodyl  10 mg Oral Daily    docusate sodium  100 mg Oral BID    dronabinol  2.5 mg Oral QHS    famotidine  20 mg Oral QHS    fluconazole  200 mg Oral Daily    gabapentin  300 mg Oral QHS    heparin (porcine)  5,000 Units Subcutaneous Q8H    levETIRAcetam  500 mg Oral BID    levothyroxine  75 mcg Oral Before breakfast    polyethylene glycol  17 g Oral BID    predniSONE  50 mg Oral Daily    Followed by    [START ON 6/18/2018] predniSONE  40 mg Oral Daily    Followed by    [START ON 6/25/2018] predniSONE  30 mg Oral Daily    Followed by    [START ON 7/2/2018] predniSONE  20 mg Oral Daily    sevelamer carbonate  1,600 mg Oral TID WM    sirolimus  1 mg Oral Daily    sodium bicarbonate  1 tablet Oral BID    torsemide  60 mg Oral Daily    ursodiol  300 mg Oral BID     Continuous Infusions:  PRN Meds:sodium chloride 0.9%, acetaminophen, heparin (porcine), oxyCODONE, oxyCODONE, ramelteon    Review of Systems   Constitutional: Positive for fatigue. Negative for activity change, appetite change and chills.   HENT: Negative.    Eyes: Negative.    Respiratory: Negative for chest tightness and shortness of breath.    Cardiovascular: Negative for palpitations and leg swelling.   Gastrointestinal: Positive for abdominal distention, abdominal pain and constipation. Negative for nausea and vomiting.   Genitourinary: Negative.    Musculoskeletal: Positive for back pain. Negative for arthralgias.   Skin: Negative.    Allergic/Immunologic: Positive for immunocompromised state.   Neurological: Negative.    Psychiatric/Behavioral: Positive for sleep disturbance. Negative for confusion and decreased concentration. The patient is nervous/anxious.      Objective:     Vital Signs (Most Recent):  Temp: 98.4 °F (36.9 °C) (06/11/18 1140)  Pulse: 104 (06/11/18 1415)  Resp: 16 (06/11/18 0811)  BP: 135/79 (06/11/18 1415)  SpO2: 99 %  (06/11/18 0811) Vital Signs (24h Range):  Temp:  [97.8 °F (36.6 °C)-98.6 °F (37 °C)] 98.4 °F (36.9 °C)  Pulse:  [] 104  Resp:  [15-16] 16  SpO2:  [96 %-99 %] 99 %  BP: (121-135)/(69-80) 135/79     Weight: 64.2 kg (141 lb 8.6 oz)  Body mass index is 22.84 kg/m².    Intake/Output - Last 3 Shifts       06/09 0700 - 06/10 0659 06/10 0700 - 06/11 0659 06/11 0700 - 06/12 0659    P.O. 800 960 460    Total Intake(mL/kg) 800 (12.7) 960 (15) 460 (7.2)    Urine (mL/kg/hr) 2550 (1.7) 2840 (1.8) 150 (0.3)    Total Output 2550 2840 150    Net -1750 -1880 +310           Stool Occurrence 1 x 1 x           Physical Exam   Constitutional: He is oriented to person, place, and time. Vital signs are normal. He appears well-developed.   Chronic ill appearing    HENT:   Head: Normocephalic and atraumatic.   Right Ear: Hearing, tympanic membrane and ear canal normal.   Left Ear: Hearing, tympanic membrane and ear canal normal.   Nose: Nose normal.   Mouth/Throat: Uvula is midline.   Eyes: Pupils are equal, round, and reactive to light.   Neck: Trachea normal, normal range of motion and full passive range of motion without pain. No JVD present.   Cardiovascular: Normal rate, regular rhythm, normal heart sounds, intact distal pulses and normal pulses.    No murmur heard.  Pulmonary/Chest: Effort normal and breath sounds normal. No respiratory distress. He has no wheezes. He has no rales. He exhibits no tenderness.   Diminished at bases.    Abdominal: Soft. Normal appearance and bowel sounds are normal. He exhibits distension. He exhibits no ascites. There is tenderness. There is no rigidity, no rebound, no guarding and no CVA tenderness.   Musculoskeletal: Normal range of motion. He exhibits edema (BLE ). He exhibits no tenderness.   Neurological: He is alert and oriented to person, place, and time. He has normal reflexes.   Skin: Skin is warm and dry.   R IJ permacath   Psychiatric: He has a normal mood and affect. His speech is  normal and behavior is normal. Judgment and thought content normal. Cognition and memory are normal.   Nursing note and vitals reviewed.      Laboratory:  Immunosuppressants         Stop Route Frequency     sirolimus tablet 1 mg      -- Oral Daily        CBC:     Recent Labs  Lab 06/11/18  0458   WBC 5.15   RBC 2.70*   HGB 8.7*   HCT 26.4*   PLT 27*   MCV 98   MCH 32.2*   MCHC 33.0     CMP:     Recent Labs  Lab 06/11/18  0458   *   CALCIUM 8.7   ALBUMIN 2.9*   PROT 6.1      K 3.9   CO2 21*      BUN 97*   CREATININE 3.7*   ALKPHOS 124   ALT 16   AST 18   BILITOT 1.0     Labs within the past 24 hours have been reviewed.    Diagnostic Results:  pertinent imaging reviewed. Scheduled Meds:   sodium chloride 0.9%   Intravenous Once    amLODIPine  5 mg Oral Daily    bisacodyl  10 mg Oral Daily    docusate sodium  100 mg Oral BID    dronabinol  2.5 mg Oral QHS    famotidine  20 mg Oral QHS    fluconazole  200 mg Oral Daily    gabapentin  300 mg Oral QHS    heparin (porcine)  5,000 Units Subcutaneous Q8H    levETIRAcetam  500 mg Oral BID    levothyroxine  75 mcg Oral Before breakfast    polyethylene glycol  17 g Oral BID    predniSONE  50 mg Oral Daily    Followed by    [START ON 6/18/2018] predniSONE  40 mg Oral Daily    Followed by    [START ON 6/25/2018] predniSONE  30 mg Oral Daily    Followed by    [START ON 7/2/2018] predniSONE  20 mg Oral Daily    sevelamer carbonate  1,600 mg Oral TID WM    sirolimus  1 mg Oral Daily    sodium bicarbonate  1 tablet Oral BID    torsemide  60 mg Oral Daily    ursodiol  300 mg Oral BID     Continuous Infusions:  PRN Meds:sodium chloride 0.9%, acetaminophen, heparin (porcine), oxyCODONE, oxyCODONE, ramelteon    Review of Systems  Objective:     Vital Signs (Most Recent):  Temp: 98.4 °F (36.9 °C) (06/11/18 1140)  Pulse: 104 (06/11/18 1400)  Resp: 16 (06/11/18 0811)  BP: 127/72 (06/11/18 1400)  SpO2: 99 % (06/11/18 0811) Vital Signs (24h  "Range):  Temp:  [97.8 °F (36.6 °C)-98.6 °F (37 °C)] 98.4 °F (36.9 °C)  Pulse:  [] 104  Resp:  [15-16] 16  SpO2:  [96 %-99 %] 99 %  BP: (121-135)/(69-80) 127/72     Weight: 64.2 kg (141 lb 8.6 oz)  Body mass index is 22.84 kg/m².    Intake/Output - Last 3 Shifts       700 - 06/10 0659 06/10 0700 -  - 0659    P.O. 800 960 460    Total Intake(mL/kg) 800 (12.7) 960 (15) 460 (7.2)    Urine (mL/kg/hr) 2550 (1.7) 2840 (1.8) 150 (0.3)    Total Output 2550 2840 150    Net -1750 -1880 +310           Stool Occurrence 1 x 1 x           Physical Exam    Laboratory:  Immunosuppressants         Stop Route Frequency     sirolimus tablet 1 mg      -- Oral Daily        {Results:04812::"Labs within the past 24 hours have been reviewed."}    Diagnostic Results:  {Select Imagin}  "

## 2018-06-11 NOTE — PROGRESS NOTES
[facility] NOTE    Jhonny Diana is a 27 y.o. male currently on hemodialysis for removal of uremic toxins and volume.    Labs have been reviewed and the dialysate bath has been adjusted.    There are no symptoms of hypotension, chest pain, dyspnea, nausea or vomiting.    Labs:        Recent Labs  Lab 06/09/18  0435 06/10/18  0451 06/11/18  0458   * 139 138   K 4.9 4.2 3.9    104 102   CO2 18* 22* 21*   BUN 77* 89* 97*   CREATININE 3.5* 3.8* 3.7*   CALCIUM 8.2* 8.6* 8.7   PHOS 6.6* 6.1* 5.4*         Recent Labs  Lab 06/09/18  0435 06/10/18  0452 06/11/18  0458   WBC 4.67 5.78 5.15   HGB 9.2* 9.0* 8.7*   HCT 27.9* 27.0* 26.4*   PLT 22* 27* 27*       Assessment/Plan:    HD today for removal of uremic toxins and volume.

## 2018-06-11 NOTE — PROGRESS NOTES
Dialysis tx completed. 3 hours. 2 liters removed. Right CVC locked with heparin, capped and secured. New dressing applied. Tolerated tx well. Report given to Emmanuel SHEEHAN.

## 2018-06-11 NOTE — PROGRESS NOTES
Admit Note     Met with patient to assess needs. Patient is a 27 y.o. single male, admitted for abdominal pain .      Patient admitted from the emergency room on 6/7/2018 .  At this time, patient presents as alert and oriented x 4, pleasant, good eye contact, communicative, cooperative and asking and answering questions appropriately.  At this time, patients caregiver presents as alert and oriented x 4, pleasant, good eye contact, calm, communicative and cooperative.    Household/Family Systems     Patient resides with patient's mother, at [address]  [place] [address].  Patient no longer has his apartment in [place].   Support system includes pts mom Sanjuanita Diana.  Patient does have support from his sister Miranda and aunt Sophy if needed.  Patient does not have dependents that are need of being cared for.     Patients primary caregiver is Sanjuanita Diana, patients mother, phone number [phone].  Confirmed patients contact information is [phone] (home);         Telephone Information:   Mobile [phone]   .     During admission, patient's caregiver plans to stay in patient's room.  Confirmed patient and patients caregivers do have access to reliable transportation.     Cognitive Status/Learning      Patient reports reading ability as college and states patient does have difficulty with comprehension and concentration due to pain at times but has been ok.    Patient reports patient learns best by written and verbal information and demonstration.     Needed: No.   Highest education level: Attended College/Technical School     Vocation/Disability   .  Working for Income: No  If no, reason not working: Demands of Treatment     Adherence      Patient reports a high level of adherence to patients health care regimen.  Adherence counseling and education provided. Patient verbalizes understanding.     Substance Use     Patient reports the following substance usage.    Tobacco: none,  patient denies any use.  Alcohol: none, patient denies any use. Patient's last ETOH use was 2017 when he stopped with diagnosis of liver disease.  Patient drank heavily in the past. Patient has not enrolled in [facility] due to continued medical complications.  Pt has request to complete IOP in [place] as he and his mother have been here for almost a year and wants to return home.  Dr. Rogers and team are ok with the patient enrolling in [facility] in [place].     Illicit Drugs/Non-prescribed Medications: none, patient denies any use.  In  addiction to Lortab.   Patient states clear understanding of the potential impact of substance use.  Substance abstinence/cessation counseling, education and resources provided and reviewed.      Services Utilizing/ADLS     Infusion Service: Prior to admission, patient utilizing? no  Home Health: Prior to admission, patient utilizing? no , has used [facility] in past   Outpatient Physical Rehab: [facility], pt had to cancel first appointment and never rescheduled. Pt not sure if he is willing to start again because he cannot stay out of the hospital.   DME: Prior to admission, yes RW and WC  Pulmonary/Cardiac Rehab: Prior to admission, no  Dialysis:  Prior to admission, yes [facility] [phone] (is considered chronic) MF at 3:30 pm  Transplant Specialty Pharmacy:  Prior to admission, yes; [facility].     Prior to admission, patient reports patient was independent with ADLS and was not driving.  Patient reports patient is not able to care for self at this time due to compromised medical condition (as documented in medical record) and physical weakness..  Patient indicates a willingness to care for self once medically cleared to do so.     Insurance/Medications     Insured by   Payor/Plan Subscr  Sex Relation Sub. Ins. ID Effective Group Num   1. [facility]* RAFFY THOMAS 1990 Male   [id number]                                   RACH MCDONALD  [id number]         2. [employer]* RAFFY DIANA 1990 Male   [id number] 9/[birth year] [id number]                                   [address] [address]            Primary Insurance (for UNOS reporting): Private Insurance  Secondary Insurance (for UNOS reporting): None     Patient reports patient is able to obtain and afford medications at this time and at time of discharge.     Living Will/Healthcare Power of      Patient states patient has a LW and/or HCPA.   provided education regarding LW and HCPA and the completion of forms.     Coping/Mental Health     Patient is coping well with the aid of  family members. Patient has diffuculty with pain and medical complications since transplant.  Pt reports he just pushes through. Patient not receiving any psychotherapy or medication management, he has been able to manage symptoms on his own.  Patient denies mental health difficulties.      Discharge Planning     At time of discharge, patient plans to return to [employer] apartments under the care of mother Sanjuanita Diana. With hopes of going home to [place] within two weeks.  Patients mother will transport patient.  Per rounds today, expected discharge date has not been determined. Patient and patients caregiver  verbalize understanding and are involved in treatment planning and discharge process.     Additional Concerns     Patient is being followed for needs, education, resources, information, emotional support, supportive counseling, and for supportive and skilled discharge plan of care.  providing ongoing psychosocial support, education, resources and d/c planning as needed.  SW remains available. Patient denies additional needs and/or concerns at this time. Patient verbalizes understanding and agreement with information reviewed, social work availability, and how to access available resources as needed. SW will contact [facility] at discharge to update information on starting the process for transfer of  services to [place] per patient request once cleared by LTS to start the referral.

## 2018-06-11 NOTE — PROGRESS NOTES
[facility]  Liver Transplant  Progress Note    Patient Name: Jhonny Diana  MRN: [id number]  Admission Date: 2018  Hospital Length of Stay: 4 days  Code Status: Full Code  Primary Care Provider: Primary Doctor No  Post-Operative Day: 235    ORGAN:   LIVER  Disease Etiology: Acute Alcoholic Hepatitis  Donor Type:    - Brain Death  CDC High Risk:   No  Donor CMV Status:   Donor CMV Status: Positive  Donor HBcAB:   Negative  Donor HCV Status:   Negative  Whole or Partial: Whole Liver  Biliary Anastomosis: End to End  Arterial Anatomy: Standard  Subjective:     History of Present Illness:  28 y/o M who underwent liver Txp in Oct of 2017 present to the ER with diffuse abdominal pain and tachycardia.  Pt has recently been discharged with similar complaints.  While he has been dealing with this pain for some time, reports it is getting worse over the past day.  Pt has peen tolerating normal PO, urinating a good amount, and having normal bowel function.  Denies fevers or chills.  Pain is worse on the left flank.       Hospital Course:  Interval history: no acute events overnight. He continues to have abdominal pain. Paracentesis  negative for infection. Wbc 12, segs 1%. CT reviewed by staff. No acute findings to explain pain. Pain management discussed pain block with pt, however not an option due to low platelets.  Will trial adding antidepressant and sleep aid.  Nephrology following, plan HD today. Monitor.     Scheduled Meds:   sodium chloride 0.9%   Intravenous Once    amLODIPine  5 mg Oral Daily    bisacodyl  10 mg Oral Daily    docusate sodium  100 mg Oral BID    famotidine  20 mg Oral QHS    fluconazole  200 mg Oral Daily    gabapentin  300 mg Oral QHS    heparin (porcine)  5,000 Units Subcutaneous Q8H    levETIRAcetam  500 mg Oral BID    levothyroxine  75 mcg Oral Before breakfast    polyethylene glycol  17 g Oral BID    predniSONE  50 mg Oral Daily    Followed by    [START  ON 6/18/2018] predniSONE  40 mg Oral Daily    Followed by    [START ON 6/25/2018] predniSONE  30 mg Oral Daily    Followed by    [START ON 7/2/2018] predniSONE  20 mg Oral Daily    sevelamer carbonate  1,600 mg Oral TID WM    sirolimus  1 mg Oral Daily    sodium bicarbonate  1 tablet Oral BID    torsemide  60 mg Oral Daily    traZODone  25 mg Oral QHS    ursodiol  300 mg Oral BID    venlafaxine  37.5 mg Oral Daily     Continuous Infusions:  PRN Meds:sodium chloride 0.9%, acetaminophen, heparin (porcine), oxyCODONE, oxyCODONE, ramelteon    Review of Systems   Constitutional: Positive for fatigue. Negative for activity change, appetite change, chills and fever.   HENT: Negative.    Eyes: Negative.    Respiratory: Negative for cough, chest tightness, shortness of breath and wheezing.    Cardiovascular: Negative for chest pain, palpitations and leg swelling.   Gastrointestinal: Positive for abdominal distention, abdominal pain and constipation. Negative for nausea and vomiting.   Genitourinary: Negative.    Musculoskeletal: Positive for back pain. Negative for arthralgias.   Skin: Negative.  Negative for wound.   Allergic/Immunologic: Positive for immunocompromised state.   Neurological: Negative.    Psychiatric/Behavioral: Positive for sleep disturbance. Negative for confusion and decreased concentration. The patient is nervous/anxious.      Objective:     Vital Signs (Most Recent):  Temp: 98.6 °F (37 °C) (06/11/18 1530)  Pulse: 97 (06/11/18 1530)  Resp: 16 (06/11/18 1530)  BP: 133/79 (06/11/18 1530)  SpO2: 99 % (06/11/18 1530) Vital Signs (24h Range):  Temp:  [97.8 °F (36.6 °C)-98.7 °F (37.1 °C)] 98.6 °F (37 °C)  Pulse:  [] 97  Resp:  [15-16] 16  SpO2:  [96 %-99 %] 99 %  BP: (121-136)/(69-80) 133/79     Weight: 64.2 kg (141 lb 8.6 oz)  Body mass index is 22.84 kg/m².    Intake/Output - Last 3 Shifts       06/09 0700 - 06/10 0659 06/10 0700 - 06/11 0659 06/11 0700 - 06/12 0659    P.O. 800 314 702     Other   600    Total Intake(mL/kg) 800 (12.7) 960 (15) 1060 (16.5)    Urine (mL/kg/hr) 2550 (1.7) 2840 (1.8) 980 (1.6)    Other   2600 (4.2)    Total Output 2550 2840 3580    Net -1750 -1880 -2520           Stool Occurrence 1 x 1 x           Physical Exam   Constitutional: He is oriented to person, place, and time. Vital signs are normal. He appears well-developed.   Chronic ill appearing    HENT:   Head: Normocephalic and atraumatic.   Right Ear: Hearing, tympanic membrane and ear canal normal.   Left Ear: Hearing, tympanic membrane and ear canal normal.   Nose: Nose normal.   Mouth/Throat: Uvula is midline.   Eyes: Pupils are equal, round, and reactive to light.   Neck: Trachea normal, normal range of motion and full passive range of motion without pain. No JVD present.   Cardiovascular: Normal rate, regular rhythm, normal heart sounds, intact distal pulses and normal pulses.    No murmur heard.  Pulmonary/Chest: Effort normal and breath sounds normal. No respiratory distress. He has no wheezes. He has no rales. He exhibits no tenderness.   Diminished at bases.    Abdominal: Soft. Normal appearance and bowel sounds are normal. He exhibits distension. He exhibits no ascites. There is tenderness. There is no rigidity, no rebound, no guarding and no CVA tenderness.   Musculoskeletal: Normal range of motion. He exhibits edema (BLE ). He exhibits no tenderness.   Neurological: He is alert and oriented to person, place, and time. He has normal reflexes.   Skin: Skin is warm and dry.   R IJ permacath   Psychiatric: He has a normal mood and affect. His speech is normal and behavior is normal. Judgment and thought content normal. Cognition and memory are normal.   Nursing note and vitals reviewed.      Laboratory:  Immunosuppressants         Stop Route Frequency     sirolimus tablet 1 mg      -- Oral Daily        CBC:     Recent Labs  Lab 06/11/18  0458   WBC 5.15   RBC 2.70*   HGB 8.7*   HCT 26.4*   PLT 27*   MCV 98   MCH  32.2*   MCHC 33.0     CMP:     Recent Labs  Lab 06/11/18  0458   *   CALCIUM 8.7   ALBUMIN 2.9*   PROT 6.1      K 3.9   CO2 21*      BUN 97*   CREATININE 3.7*   ALKPHOS 124   ALT 16   AST 18   BILITOT 1.0     Labs within the past 24 hours have been reviewed.    Diagnostic Results:  I have personally reviewed all pertinent imaging studies.    Assessment/Plan:     * Abdominal pain    - no physical changes.  - relieved with po and iv pain medications.  - decrease oral pain meds to oxy 5-10 mg q 4 hr.  - adding effexor and trazadone  - spoke with pain management, unable to nerve block due to platelets        Other pancytopenia              Severe protein-calorie malnutrition    - encourage po intake.  - appetite improving.             Anemia of chronic renal failure    - H&H stable.           S/P liver transplant    - LFTs stable.  - bilirubin stable.        ESRD Monday/Friday    - nephrology consulted.  - chronic hd mon/friday.  - nephrology does not feel HD necessary 6/8, will plan for HD Monday.          Ascites due to alcoholic cirrhosis    - diagnostic paracentesis 6/8.   - negative for infection. Wbc 12, segs 1%.   - monitor.              VTE Risk Mitigation         Ordered     heparin (porcine) injection 1,000 Units  As needed (PRN)      06/11/18 1313     heparin (porcine) injection 5,000 Units  Every 8 hours      06/07/18 2252     IP VTE HIGH RISK PATIENT  Once      06/07/18 2252     Place sequential compression device  Until discontinued      06/07/18 2252          The patients clinical status was discussed at multidisplinary rounds, involving transplant surgery, transplant medicine, pharmacy, nursing, nutrition, and social work    Discharge Planning:  Monitor kidney function --> may need outpt HD  Monitor HH needs vs rehab for deconditioned status      Seda Camacho, NP  Liver Transplant  [facility]

## 2018-06-11 NOTE — PLAN OF CARE
Problem: Patient Care Overview  Goal: Plan of Care Review  Plan of care reviewed on am rounds, afrebrile, vss  Wbc 5 anemia stable, plts remain low at 27, INR 1 lfts nl, abdomen remains distended with ongoing c/o achy, sharp pains radiating  to back 7/10 scale with requests for prn Oxy every 4 hours, states pain decreases to 5-6 at best.. Plan to start anti depressant tonite along with sleep med. Dialyzed today with 2 liters removed. Good urine output and having formed bms.Up ambulatory in room and halls, supportive mother at

## 2018-06-11 NOTE — ASSESSMENT & PLAN NOTE
- no physical changes.  - relieved with po and iv pain medications.  - decrease oral pain meds to oxy 5-10 mg q 4 hr.  - adding effexor and trazadone  - spoke with pain management, unable to nerve block due to platelets

## 2018-06-11 NOTE — SUBJECTIVE & OBJECTIVE
Scheduled Meds:   sodium chloride 0.9%   Intravenous Once    amLODIPine  5 mg Oral Daily    bisacodyl  10 mg Oral Daily    docusate sodium  100 mg Oral BID    famotidine  20 mg Oral QHS    fluconazole  200 mg Oral Daily    gabapentin  300 mg Oral QHS    heparin (porcine)  5,000 Units Subcutaneous Q8H    levETIRAcetam  500 mg Oral BID    levothyroxine  75 mcg Oral Before breakfast    polyethylene glycol  17 g Oral BID    predniSONE  50 mg Oral Daily    Followed by    [START ON 6/18/2018] predniSONE  40 mg Oral Daily    Followed by    [START ON 6/25/2018] predniSONE  30 mg Oral Daily    Followed by    [START ON 7/2/2018] predniSONE  20 mg Oral Daily    sevelamer carbonate  1,600 mg Oral TID WM    sirolimus  1 mg Oral Daily    sodium bicarbonate  1 tablet Oral BID    torsemide  60 mg Oral Daily    traZODone  25 mg Oral QHS    ursodiol  300 mg Oral BID    venlafaxine  37.5 mg Oral Daily     Continuous Infusions:  PRN Meds:sodium chloride 0.9%, acetaminophen, heparin (porcine), oxyCODONE, oxyCODONE, ramelteon    Review of Systems   Constitutional: Positive for fatigue. Negative for activity change, appetite change, chills and fever.   HENT: Negative.    Eyes: Negative.    Respiratory: Negative for cough, chest tightness, shortness of breath and wheezing.    Cardiovascular: Negative for chest pain, palpitations and leg swelling.   Gastrointestinal: Positive for abdominal distention, abdominal pain and constipation. Negative for nausea and vomiting.   Genitourinary: Negative.    Musculoskeletal: Positive for back pain. Negative for arthralgias.   Skin: Negative.  Negative for wound.   Allergic/Immunologic: Positive for immunocompromised state.   Neurological: Negative.    Psychiatric/Behavioral: Positive for sleep disturbance. Negative for confusion and decreased concentration. The patient is nervous/anxious.      Objective:     Vital Signs (Most Recent):  Temp: 98.6 °F (37 °C) (06/11/18 1530)  Pulse:  97 (06/11/18 1530)  Resp: 16 (06/11/18 1530)  BP: 133/79 (06/11/18 1530)  SpO2: 99 % (06/11/18 1530) Vital Signs (24h Range):  Temp:  [97.8 °F (36.6 °C)-98.7 °F (37.1 °C)] 98.6 °F (37 °C)  Pulse:  [] 97  Resp:  [15-16] 16  SpO2:  [96 %-99 %] 99 %  BP: (121-136)/(69-80) 133/79     Weight: 64.2 kg (141 lb 8.6 oz)  Body mass index is 22.84 kg/m².    Intake/Output - Last 3 Shifts       06/09 0700 - 06/10 0659 06/10 0700 - 06/11 0659 06/11 0700 - 06/12 0659    P.O. 800 960 460    Other   600    Total Intake(mL/kg) 800 (12.7) 960 (15) 1060 (16.5)    Urine (mL/kg/hr) 2550 (1.7) 2840 (1.8) 980 (1.6)    Other   2600 (4.2)    Total Output 2550 2840 3580    Net -1750 -1880 -2520           Stool Occurrence 1 x 1 x           Physical Exam   Constitutional: He is oriented to person, place, and time. Vital signs are normal. He appears well-developed.   Chronic ill appearing    HENT:   Head: Normocephalic and atraumatic.   Right Ear: Hearing, tympanic membrane and ear canal normal.   Left Ear: Hearing, tympanic membrane and ear canal normal.   Nose: Nose normal.   Mouth/Throat: Uvula is midline.   Eyes: Pupils are equal, round, and reactive to light.   Neck: Trachea normal, normal range of motion and full passive range of motion without pain. No JVD present.   Cardiovascular: Normal rate, regular rhythm, normal heart sounds, intact distal pulses and normal pulses.    No murmur heard.  Pulmonary/Chest: Effort normal and breath sounds normal. No respiratory distress. He has no wheezes. He has no rales. He exhibits no tenderness.   Diminished at bases.    Abdominal: Soft. Normal appearance and bowel sounds are normal. He exhibits distension. He exhibits no ascites. There is tenderness. There is no rigidity, no rebound, no guarding and no CVA tenderness.   Musculoskeletal: Normal range of motion. He exhibits edema (BLE ). He exhibits no tenderness.   Neurological: He is alert and oriented to person, place, and time. He has normal  reflexes.   Skin: Skin is warm and dry.   R IJ permacath   Psychiatric: He has a normal mood and affect. His speech is normal and behavior is normal. Judgment and thought content normal. Cognition and memory are normal.   Nursing note and vitals reviewed.      Laboratory:  Immunosuppressants         Stop Route Frequency     sirolimus tablet 1 mg      -- Oral Daily        CBC:     Recent Labs  Lab 06/11/18  0458   WBC 5.15   RBC 2.70*   HGB 8.7*   HCT 26.4*   PLT 27*   MCV 98   MCH 32.2*   MCHC 33.0     CMP:     Recent Labs  Lab 06/11/18  0458   *   CALCIUM 8.7   ALBUMIN 2.9*   PROT 6.1      K 3.9   CO2 21*      BUN 97*   CREATININE 3.7*   ALKPHOS 124   ALT 16   AST 18   BILITOT 1.0     Labs within the past 24 hours have been reviewed.    Diagnostic Results:  I have personally reviewed all pertinent imaging studies.

## 2018-06-11 NOTE — PROGRESS NOTES
Pt received 10mg oxy at 2am and then said it wasn't working at 4am. I notified dr. Ma on call with LTS. She ordered 2mg iv morphine x1 dose.

## 2018-06-12 LAB
ALBUMIN SERPL BCP-MCNC: 2.7 G/DL
ALP SERPL-CCNC: 131 U/L
ALT SERPL W/O P-5'-P-CCNC: 14 U/L
ANION GAP SERPL CALC-SCNC: 14 MMOL/L
ANISOCYTOSIS BLD QL SMEAR: SLIGHT
AST SERPL-CCNC: 20 U/L
BACTERIA FLD AEROBE CULT: NO GROWTH
BASOPHILS # BLD AUTO: 0 K/UL
BASOPHILS NFR BLD: 0 %
BILIRUB SERPL-MCNC: 0.8 MG/DL
BUN SERPL-MCNC: 48 MG/DL
CALCIUM SERPL-MCNC: 8.6 MG/DL
CHLORIDE SERPL-SCNC: 103 MMOL/L
CO2 SERPL-SCNC: 23 MMOL/L
CREAT SERPL-MCNC: 2.4 MG/DL
DACRYOCYTES BLD QL SMEAR: ABNORMAL
DIFFERENTIAL METHOD: ABNORMAL
EOSINOPHIL # BLD AUTO: 0 K/UL
EOSINOPHIL NFR BLD: 1 %
ERYTHROCYTE [DISTWIDTH] IN BLOOD BY AUTOMATED COUNT: 15.1 %
EST. GFR  (AFRICAN AMERICAN): 40.9 ML/MIN/1.73 M^2
EST. GFR  (NON AFRICAN AMERICAN): 35.4 ML/MIN/1.73 M^2
GLUCOSE SERPL-MCNC: 98 MG/DL
GRAM STN SPEC: NORMAL
GRAM STN SPEC: NORMAL
HCT VFR BLD AUTO: 25.2 %
HGB BLD-MCNC: 8.2 G/DL
IMM GRANULOCYTES # BLD AUTO: 0.03 K/UL
IMM GRANULOCYTES NFR BLD AUTO: 0.8 %
INR PPP: 1
LYMPHOCYTES # BLD AUTO: 0.6 K/UL
LYMPHOCYTES NFR BLD: 16.5 %
MAGNESIUM SERPL-MCNC: 1.8 MG/DL
MCH RBC QN AUTO: 31.7 PG
MCHC RBC AUTO-ENTMCNC: 32.5 G/DL
MCV RBC AUTO: 97 FL
MONOCYTES # BLD AUTO: 0.5 K/UL
MONOCYTES NFR BLD: 12.3 %
NEUTROPHILS # BLD AUTO: 2.6 K/UL
NEUTROPHILS NFR BLD: 69.4 %
NRBC BLD-RTO: 0 /100 WBC
PHOSPHATE SERPL-MCNC: 4.6 MG/DL
PLATELET # BLD AUTO: 28 K/UL
PLATELET BLD QL SMEAR: ABNORMAL
PMV BLD AUTO: 11.1 FL
POIKILOCYTOSIS BLD QL SMEAR: SLIGHT
POTASSIUM SERPL-SCNC: 3.8 MMOL/L
PROT SERPL-MCNC: 5.9 G/DL
PROTHROMBIN TIME: 10.3 SEC
RBC # BLD AUTO: 2.59 M/UL
SODIUM SERPL-SCNC: 140 MMOL/L
WBC # BLD AUTO: 3.81 K/UL

## 2018-06-12 PROCEDURE — 99233 SBSQ HOSP IP/OBS HIGH 50: CPT | Mod: NTX,,, | Performed by: NURSE PRACTITIONER

## 2018-06-12 PROCEDURE — 85025 COMPLETE CBC W/AUTO DIFF WBC: CPT | Mod: NTX

## 2018-06-12 PROCEDURE — 25000003 PHARM REV CODE 250: Mod: NTX | Performed by: STUDENT IN AN ORGANIZED HEALTH CARE EDUCATION/TRAINING PROGRAM

## 2018-06-12 PROCEDURE — 83735 ASSAY OF MAGNESIUM: CPT | Mod: NTX

## 2018-06-12 PROCEDURE — 63600175 PHARM REV CODE 636 W HCPCS: Mod: NTX | Performed by: STUDENT IN AN ORGANIZED HEALTH CARE EDUCATION/TRAINING PROGRAM

## 2018-06-12 PROCEDURE — 25000003 PHARM REV CODE 250: Mod: NTX | Performed by: NURSE PRACTITIONER

## 2018-06-12 PROCEDURE — 20600001 HC STEP DOWN PRIVATE ROOM: Mod: NTX

## 2018-06-12 PROCEDURE — 80053 COMPREHEN METABOLIC PANEL: CPT | Mod: NTX

## 2018-06-12 PROCEDURE — 36415 COLL VENOUS BLD VENIPUNCTURE: CPT | Mod: NTX

## 2018-06-12 PROCEDURE — 85610 PROTHROMBIN TIME: CPT | Mod: NTX

## 2018-06-12 PROCEDURE — 99223 1ST HOSP IP/OBS HIGH 75: CPT | Mod: NTX,,, | Performed by: INTERNAL MEDICINE

## 2018-06-12 PROCEDURE — 84100 ASSAY OF PHOSPHORUS: CPT | Mod: NTX

## 2018-06-12 PROCEDURE — 63600175 PHARM REV CODE 636 W HCPCS: Mod: NTX | Performed by: NURSE PRACTITIONER

## 2018-06-12 RX ORDER — POLYETHYLENE GLYCOL 3350 17 G/17G
17 POWDER, FOR SOLUTION ORAL 3 TIMES DAILY
Status: DISCONTINUED | OUTPATIENT
Start: 2018-06-12 | End: 2018-06-16 | Stop reason: HOSPADM

## 2018-06-12 RX ADMIN — ACETAMINOPHEN 650 MG: 325 TABLET, FILM COATED ORAL at 02:06

## 2018-06-12 RX ADMIN — TRAZODONE HYDROCHLORIDE 25 MG: 50 TABLET ORAL at 09:06

## 2018-06-12 RX ADMIN — ACETAMINOPHEN 650 MG: 325 TABLET, FILM COATED ORAL at 06:06

## 2018-06-12 RX ADMIN — LEVETIRACETAM 500 MG: 500 TABLET ORAL at 09:06

## 2018-06-12 RX ADMIN — POLYETHYLENE GLYCOL 3350 17 G: 17 POWDER, FOR SOLUTION ORAL at 03:06

## 2018-06-12 RX ADMIN — OXYCODONE HYDROCHLORIDE 5 MG: 5 TABLET ORAL at 09:06

## 2018-06-12 RX ADMIN — GABAPENTIN 300 MG: 300 CAPSULE ORAL at 09:06

## 2018-06-12 RX ADMIN — POLYETHYLENE GLYCOL 3350 17 G: 17 POWDER, FOR SOLUTION ORAL at 09:06

## 2018-06-12 RX ADMIN — URSODIOL 300 MG: 300 CAPSULE ORAL at 09:06

## 2018-06-12 RX ADMIN — SIROLIMUS 1 MG: 1 TABLET, SUGAR COATED ORAL at 09:06

## 2018-06-12 RX ADMIN — DOCUSATE SODIUM 100 MG: 100 CAPSULE ORAL at 09:06

## 2018-06-12 RX ADMIN — OXYCODONE HYDROCHLORIDE 10 MG: 5 TABLET ORAL at 02:06

## 2018-06-12 RX ADMIN — SODIUM BICARBONATE 650 MG TABLET 650 MG: at 09:06

## 2018-06-12 RX ADMIN — BISACODYL 10 MG: 5 TABLET, COATED ORAL at 09:06

## 2018-06-12 RX ADMIN — ACETAMINOPHEN 650 MG: 325 TABLET, FILM COATED ORAL at 09:06

## 2018-06-12 RX ADMIN — SEVELAMER CARBONATE 1600 MG: 800 TABLET, FILM COATED ORAL at 12:06

## 2018-06-12 RX ADMIN — PREDNISONE 50 MG: 10 TABLET ORAL at 09:06

## 2018-06-12 RX ADMIN — OXYCODONE HYDROCHLORIDE 5 MG: 5 TABLET ORAL at 01:06

## 2018-06-12 RX ADMIN — VENLAFAXINE HYDROCHLORIDE 37.5 MG: 37.5 CAPSULE, EXTENDED RELEASE ORAL at 09:06

## 2018-06-12 RX ADMIN — LEVOTHYROXINE SODIUM 75 MCG: 75 TABLET ORAL at 06:06

## 2018-06-12 RX ADMIN — AMLODIPINE BESYLATE 5 MG: 5 TABLET ORAL at 09:06

## 2018-06-12 RX ADMIN — FAMOTIDINE 20 MG: 20 TABLET ORAL at 09:06

## 2018-06-12 RX ADMIN — OXYCODONE HYDROCHLORIDE 10 MG: 5 TABLET ORAL at 09:06

## 2018-06-12 RX ADMIN — OXYCODONE HYDROCHLORIDE 5 MG: 5 TABLET ORAL at 05:06

## 2018-06-12 RX ADMIN — TORSEMIDE 60 MG: 20 TABLET ORAL at 09:06

## 2018-06-12 RX ADMIN — FLUCONAZOLE 200 MG: 200 TABLET ORAL at 09:06

## 2018-06-12 NOTE — CONSULTS
Consulted received. Discussed with primary; consult for assistance with opiate cessation. Full note, evaluation, and recommendations to follow in AM.

## 2018-06-12 NOTE — ASSESSMENT & PLAN NOTE
- no physical changes.  - relieved with po and iv pain medications.  - decrease oral pain meds to oxy 5-10 mg q 4 hr.  - added effexor and trazadone  - spoke with pain management, unable to nerve block due to platelets

## 2018-06-12 NOTE — SUBJECTIVE & OBJECTIVE
Oncology Treatment Plan:   [No treatment plan]    Medications:  Continuous Infusions:  Scheduled Meds:   acetaminophen  650 mg Oral Q8H    amLODIPine  5 mg Oral Daily    bisacodyl  10 mg Oral Daily    docusate sodium  100 mg Oral BID    famotidine  20 mg Oral QHS    fluconazole  200 mg Oral Daily    gabapentin  300 mg Oral QHS    heparin (porcine)  5,000 Units Subcutaneous Q8H    levETIRAcetam  500 mg Oral BID    levothyroxine  75 mcg Oral Before breakfast    polyethylene glycol  17 g Oral BID    predniSONE  50 mg Oral Daily    Followed by    [START ON 6/18/2018] predniSONE  40 mg Oral Daily    Followed by    [START ON 6/25/2018] predniSONE  30 mg Oral Daily    Followed by    [START ON 7/2/2018] predniSONE  20 mg Oral Daily    sevelamer carbonate  1,600 mg Oral TID WM    sirolimus  1 mg Oral Daily    sodium bicarbonate  1 tablet Oral BID    torsemide  60 mg Oral Daily    traZODone  25 mg Oral QHS    ursodiol  300 mg Oral BID    venlafaxine  37.5 mg Oral Daily     PRN Meds:sodium chloride 0.9%, acetaminophen, heparin (porcine), oxyCODONE, ramelteon     Review of patient's allergies indicates:   Allergen Reactions    Bactrim [sulfamethoxazole-trimethoprim] Other (See Comments)     Mookie Trell Syndrome        Past Medical History:   Diagnosis Date    Alcoholic hepatitis with ascites     Alcoholic hepatitis with ascites     ESRD on dialysis     History of hematemesis 9/28/2017    Hypertension     Renal disorder     Seizures      Past Surgical History:   Procedure Laterality Date    APPENDECTOMY      ERCP      ESOPHAGOGASTRODUODENOSCOPY      LIVER TRANSPLANT       Family History     Problem Relation (Age of Onset)    No Known Problems Mother        Social History Main Topics    Smoking status: Former Smoker     Packs/day: 1.00     Years: 10.00     Types: Cigarettes     Start date: 2/19/2017    Smokeless tobacco: Never Used    Alcohol use No      Comment: a fifth of liquor daily for  years, cut back over last 2 months    Drug use: No    Sexual activity: Not on file       Review of Systems   Constitutional: Positive for fatigue. Negative for activity change, appetite change, chills and fever.   HENT: Negative for congestion.    Eyes: Negative.  Negative for visual disturbance.   Respiratory: Negative for cough, chest tightness, shortness of breath and wheezing.    Cardiovascular: Negative for chest pain, palpitations and leg swelling.   Gastrointestinal: Positive for abdominal pain. Negative for diarrhea, nausea and vomiting.   Genitourinary:        ESRD   Musculoskeletal: Negative for arthralgias.   Skin: Positive for pallor.   Allergic/Immunologic: Positive for immunocompromised state.   Neurological: Negative for syncope, weakness, light-headedness and headaches.   Hematological: Bruises/bleeds easily.   Psychiatric/Behavioral: Negative for confusion and decreased concentration.     Objective:     Vital Signs (Most Recent):  Temp: 98.6 °F (37 °C) (06/12/18 0830)  Pulse: 97 (06/12/18 0830)  Resp: 16 (06/12/18 0830)  BP: 129/74 (06/12/18 0830)  SpO2: 98 % (06/12/18 0830) Vital Signs (24h Range):  Temp:  [98.5 °F (36.9 °C)-98.9 °F (37.2 °C)] 98.6 °F (37 °C)  Pulse:  [] 97  Resp:  [16-18] 16  SpO2:  [97 %-99 %] 98 %  BP: (121-137)/(70-80) 129/74     Weight: 64.2 kg (141 lb 8.6 oz)  Body mass index is 22.84 kg/m².  Body surface area is 1.73 meters squared.      Intake/Output Summary (Last 24 hours) at 06/12/18 1142  Last data filed at 06/12/18 0902   Gross per 24 hour   Intake              840 ml   Output             4505 ml   Net            -3665 ml       Physical Exam   Constitutional: He is oriented to person, place, and time. Vital signs are normal. No distress.   Younger gentleman who appears chronically ill   HENT:   Head: Normocephalic and atraumatic.   Right Ear: Hearing, tympanic membrane, external ear and ear canal normal.   Left Ear: Hearing, tympanic membrane, external ear and  ear canal normal.   Mouth/Throat: Uvula is midline.   Eyes: Pupils are equal, round, and reactive to light. No scleral icterus.   Neck: Trachea normal, normal range of motion and full passive range of motion without pain.   Cardiovascular: Normal rate, regular rhythm, normal heart sounds, intact distal pulses and normal pulses.    No murmur heard.  Pulmonary/Chest: Effort normal and breath sounds normal. No respiratory distress. He has no wheezes. He has no rales. He exhibits no tenderness.   Diminished at bases.    Abdominal: Soft. Normal appearance and bowel sounds are normal. He exhibits distension. He exhibits no ascites. There is tenderness. There is no rigidity, no rebound, no guarding and no CVA tenderness.   Musculoskeletal: Normal range of motion. He exhibits edema (BLE ). He exhibits no tenderness.   Neurological: He is alert and oriented to person, place, and time. He has normal reflexes.   Skin: Skin is warm and dry. No rash noted. He is not diaphoretic. No erythema. There is pallor.   R IJ permacath   Psychiatric: He has a normal mood and affect. His speech is normal. Cognition and memory are normal.   Nursing note and vitals reviewed.      Significant Labs:   CBC:   Recent Labs  Lab 06/11/18 0458 06/12/18  0358   WBC 5.15 3.81*   HGB 8.7* 8.2*   HCT 26.4* 25.2*   PLT 27* 28*   , CMP:   Recent Labs  Lab 06/11/18 0458 06/12/18  0358    140   K 3.9 3.8    103   CO2 21* 23   * 98   BUN 97* 48*   CREATININE 3.7* 2.4*   CALCIUM 8.7 8.6*   PROT 6.1 5.9*   ALBUMIN 2.9* 2.7*   BILITOT 1.0 0.8   ALKPHOS 124 131   AST 18 20   ALT 16 14   ANIONGAP 15 14   EGFRNONAA 21.0* 35.4*   , Coagulation:   Recent Labs  Lab 06/11/18 0458 06/12/18  0358   INR 1.0 1.0   , Haptoglobin: No results for input(s): HAPTOGLOBIN in the last 48 hours., LDH: No results for input(s): LDHCSF, BFSOURCE in the last 48 hours., LFTs:   Recent Labs  Lab 06/11/18 0458 06/12/18  0358   ALT 16 14   AST 18 20   ALKPHOS 124  131   BILITOT 1.0 0.8   PROT 6.1 5.9*   ALBUMIN 2.9* 2.7*    and Reticulocytes: No results for input(s): RETIC in the last 48 hours.    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  -- INCREASED IRON STORAGE.

## 2018-06-12 NOTE — CONSULTS
[facility]  Hematology/Oncology  Consult Note    Patient Name: Jhonny Diana  MRN: [id number]  Admission Date: 6/7/2018  Hospital Length of Stay: 5 days  Code Status: Full Code   Attending Provider: Imer Badillo MD  Consulting Provider: Marylou Boggs MD  Primary Care Physician: Primary Doctor No  Principal Problem:Abdominal pain    Inpatient consult to Hematology  Consult performed by: MARYLOU BOGGS  Consult ordered by: VANDANA SAMUEL        Subjective:     HPI:  28 y/o M with PMHx of ETOH cirrhosis s/p OLTx in 10/ 2017 complicated by biliary stricture, ESRD m/f, recurrent abdominal pain, as well as pancytopenia with 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 Hematology is consulted for persistent thrombocytopenia and concern for necessity for colony stimulating agents. Patient would like to undergo pain block for his chronic abd pain , and plt goal for the procedure is 100 k. plts have shown progressive decline since 4/2018, with levels as low as 4k in 5/2017.     Patient with persistent thrombocytopenia that has been worked up in the past. 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 Component of ITP was considered.  TTP was ruled out in this patient with normal  [id number]. At that time, hematology recommended against eculizumab as its role in drug-induced TMA remains unclear. Prior HIT work up was negative. Since then, pt was deescalated off cyclosporine (on 6/1/2018) and transitioned to sirolimus (2/2 prior seizure while on tacrolimus) and  started on prednisone taper for possible ITP. Since cessation of cyclosporine, plts have slightly improved from avg of 13 k to 23-28k on more recent labs. hbg has maintained in 9-8 range with intermittent prn [facility] transfusions. 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     Oncology Treatment Plan:   [No treatment plan]    Medications:  Continuous Infusions:  Scheduled Meds:   acetaminophen  650 mg Oral Q8H    amLODIPine  5 mg Oral Daily    bisacodyl  10 mg Oral Daily    docusate sodium  100 mg Oral BID    famotidine  20 mg Oral QHS    fluconazole  200 mg Oral Daily    gabapentin  300 mg Oral QHS    heparin (porcine)  5,000 Units Subcutaneous Q8H    levETIRAcetam  500 mg Oral BID    levothyroxine  75 mcg Oral Before breakfast    polyethylene glycol  17 g Oral BID    predniSONE  50 mg Oral Daily    Followed by    [START ON 6/18/2018] predniSONE  40 mg Oral Daily    Followed by    [START ON 6/25/2018] predniSONE  30 mg Oral Daily    Followed by    [START ON 7/2/2018] predniSONE  20 mg Oral Daily    sevelamer carbonate  1,600 mg Oral TID WM    sirolimus  1 mg Oral Daily    sodium bicarbonate  1 tablet Oral BID    torsemide  60 mg Oral Daily    traZODone  25 mg Oral QHS    ursodiol  300 mg Oral BID    venlafaxine  37.5 mg Oral Daily     PRN Meds:sodium chloride 0.9%, acetaminophen, heparin (porcine), oxyCODONE, ramelteon     Review of patient's allergies indicates:   Allergen Reactions    Bactrim [sulfamethoxazole-trimethoprim] Other (See Comments)     Mookie Trell Syndrome        Past Medical History:   Diagnosis Date    Alcoholic hepatitis with ascites     Alcoholic hepatitis with ascites     ESRD on dialysis     History of hematemesis 9/28/2017    Hypertension     Renal disorder      Seizures      Past Medical History:   Diagnosis Date    Alcoholic hepatitis with ascites     Anemia     ESRD on dialysis     History of hematemesis 9/28/2017    Hypertension     Seizures     Thrombocytopenia     Transplanted liver     10/2017       Past Surgical History:   Procedure Laterality Date    APPENDECTOMY      ERCP      ESOPHAGOGASTRODUODENOSCOPY      LIVER TRANSPLANT       Family History     Problem Relation (Age of Onset)    No Known Problems Mother        Social History Main Topics    Smoking status: Former Smoker     Packs/day: 1.00     Years: 10.00     Types: Cigarettes     Start date: 2/19/2017    Smokeless tobacco: Never Used    Alcohol use No      Comment: a fifth of liquor daily for years, cut back over last 2 months    Drug use: No    Sexual activity: Not on file       Review of Systems   Constitutional: Positive for fatigue. Negative for activity change, appetite change, chills and fever.   HENT: Negative for congestion.    Eyes: Negative.  Negative for visual disturbance.   Respiratory: Negative for cough, chest tightness, shortness of breath and wheezing.    Cardiovascular: Negative for chest pain, palpitations and leg swelling.   Gastrointestinal: Positive for abdominal pain. Negative for diarrhea, nausea and vomiting.   Genitourinary:        ESRD   Musculoskeletal: Negative for arthralgias.   Skin: Positive for pallor.   Allergic/Immunologic: Positive for immunocompromised state.   Neurological: Negative for syncope, weakness, light-headedness and headaches.   Hematological: Bruises/bleeds easily.   Psychiatric/Behavioral: Negative for confusion and decreased concentration.     Objective:     Vital Signs (Most Recent):  Temp: 98.6 °F (37 °C) (06/12/18 0830)  Pulse: 97 (06/12/18 0830)  Resp: 16 (06/12/18 0830)  BP: 129/74 (06/12/18 0830)  SpO2: 98 % (06/12/18 0830) Vital Signs (24h Range):  Temp:  [98.5 °F (36.9 °C)-98.9 °F (37.2 °C)] 98.6 °F (37 °C)  Pulse:  []  97  Resp:  [16-18] 16  SpO2:  [97 %-99 %] 98 %  BP: (121-137)/(70-80) 129/74     Weight: 64.2 kg (141 lb 8.6 oz)  Body mass index is 22.84 kg/m².  Body surface area is 1.73 meters squared.      Intake/Output Summary (Last 24 hours) at 06/12/18 1142  Last data filed at 06/12/18 0902   Gross per 24 hour   Intake              840 ml   Output             4505 ml   Net            -3665 ml       Physical Exam   Constitutional: He is oriented to person, place, and time. Vital signs are normal. No distress.   Younger gentleman who appears chronically ill   HENT:   Head: Normocephalic and atraumatic.   Right Ear: Hearing, tympanic membrane, external ear and ear canal normal.   Left Ear: Hearing, tympanic membrane, external ear and ear canal normal.   Mouth/Throat: Uvula is midline.   Eyes: Pupils are equal, round, and reactive to light. No scleral icterus.   Neck: Trachea normal, normal range of motion and full passive range of motion without pain.   Cardiovascular: Normal rate, regular rhythm, normal heart sounds, intact distal pulses and normal pulses.    No murmur heard.  Pulmonary/Chest: Effort normal and breath sounds normal. No respiratory distress. He has no wheezes. He has no rales. He exhibits no tenderness.   Diminished at bases.    Abdominal: Soft. Normal appearance and bowel sounds are normal. He exhibits distension. He exhibits no ascites. There is tenderness. There is no rigidity, no rebound, no guarding and no CVA tenderness.   Musculoskeletal: Normal range of motion. He exhibits edema (BLE ). He exhibits no tenderness.   Neurological: He is alert and oriented to person, place, and time. He has normal reflexes.   Skin: Skin is warm and dry. No rash noted. He is not diaphoretic. No erythema. There is pallor.   R IJ permacath   Psychiatric: He has a normal mood and affect. His speech is normal. Cognition and memory are normal.   Nursing note and vitals reviewed.      Significant Labs:   CBC:   Recent Labs  Lab  06/11/18 0458 06/12/18 0358   WBC 5.15 3.81*   HGB 8.7* 8.2*   HCT 26.4* 25.2*   PLT 27* 28*   , CMP:   Recent Labs  Lab 06/11/18 0458 06/12/18 0358    140   K 3.9 3.8    103   CO2 21* 23   * 98   BUN 97* 48*   CREATININE 3.7* 2.4*   CALCIUM 8.7 8.6*   PROT 6.1 5.9*   ALBUMIN 2.9* 2.7*   BILITOT 1.0 0.8   ALKPHOS 124 131   AST 18 20   ALT 16 14   ANIONGAP 15 14   EGFRNONAA 21.0* 35.4*   , Coagulation:   Recent Labs  Lab 06/11/18 0458 06/12/18 0358   INR 1.0 1.0   , Haptoglobin: No results for input(s): HAPTOGLOBIN in the last 48 hours., LDH: No results for input(s): LDHCSF, BFSOURCE in the last 48 hours., LFTs:   Recent Labs  Lab 06/11/18 0458 06/12/18 0358   ALT 16 14   AST 18 20   ALKPHOS 124 131   BILITOT 1.0 0.8   PROT 6.1 5.9*   ALBUMIN 2.9* 2.7*    and Reticulocytes: No results for input(s): RETIC in the last 48 hours.    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  -- INCREASED IRON STORAGE.    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 Component of ITP was considered, given megakaryocyte hyperplasia on BM bx. HIT was previously ruled out.  TTP was ruled out given normal  [id number]. In the past,  hematology recommended against eculizumab as its role in drug-induced TMA remains unclear. Since then, pt was deescalated off cyclosporine (on 6/1/2018) , started on sirolimus, and started on prednisone taper for possible ITP. plts have shown slight improvement from 10k to 25k. Hematology consulted for possible medications to aid with plts level increase  - ddx: drug induced (  2/2 sirolimus) vs ITP  - check LDH, retic, B12, folate , fibrinogen  - check pathologist review of periph blood smear   - HIT , TTP, and DIC were previously ruled out   - would recommend holding heparin ppx while plts are < 30 k  - would recommend transfusing for plts < 10k or in active bleeding  - would recommend a trial of 1 U PLT and check CBC 1 hour post transfusion to assess response   - if PLTs do not Increase by 10k, may  be more suspicious for ITP ( given if above labs are wnl) , may consider IVIG.   - will hold off on PLT stimulating agents as they are only approved for ITP   - will follow up blood counts  - pt will need to follow up in hematology clinic       Anemia of chronic renal failure    - hbg baseline around 8-9, in setting of ESRD and s/p liver transplant   - prior iron studies showing adequate iron stores, as did the bone marrow bx on 5/14/18  - cont to monitor and transfuse prn for hbg <7        Other pancytopenia    - as above        Long-term use of immunosuppressant medication    - currently on sirolimus 1mg daily and prednisone taper  - immunosuppression management per primary team        S/P liver transplant    - as above            Thank you for your consult. Will follow       Marylou Boggs MD  Hematology/Oncology  [facility]

## 2018-06-12 NOTE — SUBJECTIVE & OBJECTIVE
Scheduled Meds:   acetaminophen  650 mg Oral Q8H    amLODIPine  5 mg Oral Daily    bisacodyl  10 mg Oral Daily    docusate sodium  100 mg Oral BID    famotidine  20 mg Oral QHS    fluconazole  200 mg Oral Daily    gabapentin  300 mg Oral QHS    heparin (porcine)  5,000 Units Subcutaneous Q8H    levETIRAcetam  500 mg Oral BID    levothyroxine  75 mcg Oral Before breakfast    polyethylene glycol  17 g Oral BID    predniSONE  50 mg Oral Daily    Followed by    [START ON 6/18/2018] predniSONE  40 mg Oral Daily    Followed by    [START ON 6/25/2018] predniSONE  30 mg Oral Daily    Followed by    [START ON 7/2/2018] predniSONE  20 mg Oral Daily    sevelamer carbonate  1,600 mg Oral TID WM    sirolimus  1 mg Oral Daily    sodium bicarbonate  1 tablet Oral BID    torsemide  60 mg Oral Daily    traZODone  25 mg Oral QHS    ursodiol  300 mg Oral BID    venlafaxine  37.5 mg Oral Daily     Continuous Infusions:  PRN Meds:sodium chloride 0.9%, acetaminophen, heparin (porcine), oxyCODONE, ramelteon    Review of Systems   Constitutional: Positive for fatigue. Negative for activity change, appetite change, chills and fever.   HENT: Negative.    Eyes: Negative.    Respiratory: Negative for cough, chest tightness, shortness of breath and wheezing.    Cardiovascular: Negative for chest pain, palpitations and leg swelling.   Gastrointestinal: Positive for abdominal distention, abdominal pain and constipation. Negative for nausea and vomiting.   Genitourinary: Negative.    Musculoskeletal: Positive for back pain. Negative for arthralgias.   Skin: Negative.  Negative for wound.   Allergic/Immunologic: Positive for immunocompromised state.   Neurological: Negative.    Psychiatric/Behavioral: Positive for dysphoric mood and sleep disturbance. Negative for confusion and decreased concentration. The patient is nervous/anxious.      Objective:     Vital Signs (Most Recent):  Temp: 97.3 °F (36.3 °C) (06/12/18  1157)  Pulse: 106 (06/12/18 1157)  Resp: 16 (06/12/18 0830)  BP: 124/72 (06/12/18 1157)  SpO2: 98 % (06/12/18 1157) Vital Signs (24h Range):  Temp:  [97.3 °F (36.3 °C)-98.9 °F (37.2 °C)] 97.3 °F (36.3 °C)  Pulse:  [] 106  Resp:  [16-18] 16  SpO2:  [97 %-99 %] 98 %  BP: (124-137)/(70-80) 124/72     Weight: 64.2 kg (141 lb 8.6 oz)  Body mass index is 22.84 kg/m².    Intake/Output - Last 3 Shifts       06/10 0700 - 06/11 0659 06/11 0700 - 06/12 0659 06/12 0700 - 06/13 0659    P.O. 960 700 360    Other  600     Total Intake(mL/kg) 960 (15) 1300 (20.2) 360 (5.6)    Urine (mL/kg/hr) 2840 (1.8) 1705 (1.1) 700 (1.7)    Other  2600 (1.7)     Total Output 2840 4305 700    Net -1880 -3005 -340           Stool Occurrence 1 x 2 x           Physical Exam   Constitutional: He is oriented to person, place, and time. Vital signs are normal. He appears well-developed.   Chronic ill appearing    HENT:   Head: Normocephalic and atraumatic.   Right Ear: Hearing, tympanic membrane and ear canal normal.   Left Ear: Hearing, tympanic membrane and ear canal normal.   Nose: Nose normal.   Mouth/Throat: Uvula is midline.   Eyes: Pupils are equal, round, and reactive to light.   Neck: Trachea normal, normal range of motion and full passive range of motion without pain. No JVD present.   Cardiovascular: Normal rate, regular rhythm, normal heart sounds, intact distal pulses and normal pulses.    No murmur heard.  Pulmonary/Chest: Effort normal and breath sounds normal. No respiratory distress. He has no wheezes. He has no rales. He exhibits no tenderness.   Diminished at bases.    Abdominal: Soft. Normal appearance and bowel sounds are normal. He exhibits distension. He exhibits no ascites. There is tenderness. There is no rigidity, no rebound, no guarding and no CVA tenderness.   Musculoskeletal: Normal range of motion. He exhibits edema (BLE trace). He exhibits no tenderness.   Neurological: He is alert and oriented to person, place, and  time. He has normal reflexes.   Skin: Skin is warm and dry.   R IJ permacath   Psychiatric: He has a normal mood and affect. His speech is normal and behavior is normal. Judgment and thought content normal. Cognition and memory are normal.   Nursing note and vitals reviewed.      Laboratory:  Immunosuppressants         Stop Route Frequency     sirolimus tablet 1 mg      -- Oral Daily        CBC:     Recent Labs  Lab 06/12/18  0358   WBC 3.81*   RBC 2.59*   HGB 8.2*   HCT 25.2*   PLT 28*   MCV 97   MCH 31.7*   MCHC 32.5     CMP:     Recent Labs  Lab 06/12/18  0358   GLU 98   CALCIUM 8.6*   ALBUMIN 2.7*   PROT 5.9*      K 3.8   CO2 23      BUN 48*   CREATININE 2.4*   ALKPHOS 131   ALT 14   AST 20   BILITOT 0.8     Labs within the past 24 hours have been reviewed.    Diagnostic Results:  I have personally reviewed all pertinent imaging studies.

## 2018-06-12 NOTE — ASSESSMENT & PLAN NOTE
- hbg baseline around 8-9, in setting of ESRD and s/p liver transplant   - prior iron studies showing adequate iron stores, as did the bone marrow bx on 5/14/18  - cont to monitor and transfuse prn for hbg <7

## 2018-06-12 NOTE — ASSESSMENT & PLAN NOTE
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 Component of ITP was considered.  TTP was ruled out given normal  [id number]. In the past,  hematology recommended against eculizumab as its role in drug-induced TMA remains unclear. Since then, pt was deescalated off cyclosporine (on 6/1/2018) and started on prednisone taper for possible ITP. plts have shown slight improvement from 10k to 25k. Hematology consulted for possible medications to aid with plts level increase  -   - would recommend holding heparin ppx while plts are < 30 k  - would recommend transfusing for plts < 10k or in active bleeding

## 2018-06-12 NOTE — ASSESSMENT & PLAN NOTE
- nephrology consulted.  - chronic hd mon/friday.  - nephrology does not feel HD necessary 6/8  - HD 6/11 - tolerated well

## 2018-06-12 NOTE — PLAN OF CARE
Problem: Patient Care Overview  Goal: Plan of Care Review  Plan of care reviewed on am rounds patient states slept well with first dose Trazadone last pm, also started Effexor yesterday.,afrebrile, vss wbc 3 anemia stable, plts 28 Cr 2.4 with last dialysis yesterday, urine output good, formed bms on laxative regimen.Hematology consulted for thrombocytopenia and psych consult placed for Oxycodone wean recs. Oxy decreased to 5mg every 4 hours prn and scheduled Tylenol added yesterday. Patient ongoing abdominal pain rated 7/10 despite current pain management regimen.Up ambulatory , non-skid socks on. Remains calm and cooperative with care. Supportive mother remains at bs, emotional support provided to both.

## 2018-06-12 NOTE — HPI
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 Hematology is consulted for persistent thrombocytopenia and concern for necessity for colony stimulating agents. plts have shown progressive decline since 4/2018, with levels as low as 4k in 5/2017.     Patient with persistent thrombocytopenia that has been worked up in the past. 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 Component of ITP was considered.  TTP was ruled out in this patient with normal  [id number]. At that time, hematology recommended against eculizumab as its role in drug-induced TMA remains unclear. Since then, pt was deescalated off cyclosporine (on 6/1/2018) and transitioned to sirolimus (2/2 prior seizure while on tacrolimus) and started on prednisone taper for possible ITP. Since cessation of cyclosporine, plts have slightly improved from avg of 13 k to 23-28k on more recent labs. hbg has maintained in 9-8 range with intermittent prn [facility] transfusions. 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

## 2018-06-12 NOTE — ASSESSMENT & PLAN NOTE
- currently on sirolimus 1mg daily and prednisone taper  - immunosuppression management per primary team

## 2018-06-12 NOTE — PROGRESS NOTES
EZEKIEL presented to the pts room to follow up and discuss IOP options in [place]. The pt and caregiver was not present in the room. EZEKIEL will attempt to follow up at a later time. EZEKIEL remains available.

## 2018-06-12 NOTE — PROGRESS NOTES
[facility]  Liver Transplant  Progress Note    Patient Name: Jhonny Diana  MRN: [id number]  Admission Date: 2018  Hospital Length of Stay: 5 days  Code Status: Full Code  Primary Care Provider: Primary Doctor No  Post-Operative Day: 236    ORGAN:   LIVER  Disease Etiology: Acute Alcoholic Hepatitis  Donor Type:    - Brain Death  CDC High Risk:   No  Donor CMV Status:   Donor CMV Status: Positive  Donor HBcAB:   Negative  Donor HCV Status:   Negative  Whole or Partial: Whole Liver  Biliary Anastomosis: End to End  Arterial Anatomy: Standard  Subjective:     History of Present Illness:  28 y/o M who underwent liver Txp in Oct of 2017 present to the ER with diffuse abdominal pain and tachycardia.  Pt has recently been discharged with similar complaints.  While he has been dealing with this pain for some time, reports it is getting worse over the past day.  Pt has peen tolerating normal PO, urinating a good amount, and having normal bowel function.  Denies fevers or chills.  Pain is worse on the left flank.       Hospital Course:  Interval history: no acute events overnight. He continues to have abdominal pain, which is chronic in nature. Paracentesis  negative for infection. Wbc 12, segs 1%. CT reviewed by staff. No acute findings to explain pain. Pain management discussed pain block with pt, however not an option due to low platelets.  Started antidepressant and sleep aid.  Patient had a better night sleep.  Mother at bedside with patient.  Hematology consulted for persistent thrombocytopenia.  Psychiatry also consulted to aid in transition off narcotics.  Nephrology following, tolerated HD . Monitor.     Scheduled Meds:   acetaminophen  650 mg Oral Q8H    amLODIPine  5 mg Oral Daily    bisacodyl  10 mg Oral Daily    docusate sodium  100 mg Oral BID    famotidine  20 mg Oral QHS    fluconazole  200 mg Oral Daily    gabapentin  300 mg Oral QHS    heparin (porcine)  5,000  Units Subcutaneous Q8H    levETIRAcetam  500 mg Oral BID    levothyroxine  75 mcg Oral Before breakfast    polyethylene glycol  17 g Oral BID    predniSONE  50 mg Oral Daily    Followed by    [START ON 6/18/2018] predniSONE  40 mg Oral Daily    Followed by    [START ON 6/25/2018] predniSONE  30 mg Oral Daily    Followed by    [START ON 7/2/2018] predniSONE  20 mg Oral Daily    sevelamer carbonate  1,600 mg Oral TID WM    sirolimus  1 mg Oral Daily    sodium bicarbonate  1 tablet Oral BID    torsemide  60 mg Oral Daily    traZODone  25 mg Oral QHS    ursodiol  300 mg Oral BID    venlafaxine  37.5 mg Oral Daily     Continuous Infusions:  PRN Meds:sodium chloride 0.9%, acetaminophen, heparin (porcine), oxyCODONE, ramelteon    Review of Systems   Constitutional: Positive for fatigue. Negative for activity change, appetite change, chills and fever.   HENT: Negative.    Eyes: Negative.    Respiratory: Negative for cough, chest tightness, shortness of breath and wheezing.    Cardiovascular: Negative for chest pain, palpitations and leg swelling.   Gastrointestinal: Positive for abdominal distention, abdominal pain and constipation. Negative for nausea and vomiting.   Genitourinary: Negative.    Musculoskeletal: Positive for back pain. Negative for arthralgias.   Skin: Negative.  Negative for wound.   Allergic/Immunologic: Positive for immunocompromised state.   Neurological: Negative.    Psychiatric/Behavioral: Positive for dysphoric mood and sleep disturbance. Negative for confusion and decreased concentration. The patient is nervous/anxious.      Objective:     Vital Signs (Most Recent):  Temp: 97.3 °F (36.3 °C) (06/12/18 1157)  Pulse: 106 (06/12/18 1157)  Resp: 16 (06/12/18 0830)  BP: 124/72 (06/12/18 1157)  SpO2: 98 % (06/12/18 1157) Vital Signs (24h Range):  Temp:  [97.3 °F (36.3 °C)-98.9 °F (37.2 °C)] 97.3 °F (36.3 °C)  Pulse:  [] 106  Resp:  [16-18] 16  SpO2:  [97 %-99 %] 98 %  BP:  (124-137)/(70-80) 124/72     Weight: 64.2 kg (141 lb 8.6 oz)  Body mass index is 22.84 kg/m².    Intake/Output - Last 3 Shifts       06/10 0700 - 06/11 0659 06/11 0700 - 06/12 0659 06/12 0700 - 06/13 0659    P.O. 960 700 360    Other  600     Total Intake(mL/kg) 960 (15) 1300 (20.2) 360 (5.6)    Urine (mL/kg/hr) 2840 (1.8) 1705 (1.1) 700 (1.7)    Other  2600 (1.7)     Total Output 2840 4305 700    Net -1880 -3005 -340           Stool Occurrence 1 x 2 x           Physical Exam   Constitutional: He is oriented to person, place, and time. Vital signs are normal. He appears well-developed.   Chronic ill appearing    HENT:   Head: Normocephalic and atraumatic.   Right Ear: Hearing, tympanic membrane and ear canal normal.   Left Ear: Hearing, tympanic membrane and ear canal normal.   Nose: Nose normal.   Mouth/Throat: Uvula is midline.   Eyes: Pupils are equal, round, and reactive to light.   Neck: Trachea normal, normal range of motion and full passive range of motion without pain. No JVD present.   Cardiovascular: Normal rate, regular rhythm, normal heart sounds, intact distal pulses and normal pulses.    No murmur heard.  Pulmonary/Chest: Effort normal and breath sounds normal. No respiratory distress. He has no wheezes. He has no rales. He exhibits no tenderness.   Diminished at bases.    Abdominal: Soft. Normal appearance and bowel sounds are normal. He exhibits distension. He exhibits no ascites. There is tenderness. There is no rigidity, no rebound, no guarding and no CVA tenderness.   Musculoskeletal: Normal range of motion. He exhibits edema (BLE trace). He exhibits no tenderness.   Neurological: He is alert and oriented to person, place, and time. He has normal reflexes.   Skin: Skin is warm and dry.   R IJ permacath   Psychiatric: He has a normal mood and affect. His speech is normal and behavior is normal. Judgment and thought content normal. Cognition and memory are normal.   Nursing note and vitals  reviewed.      Laboratory:  Immunosuppressants         Stop Route Frequency     sirolimus tablet 1 mg      -- Oral Daily        CBC:     Recent Labs  Lab 06/12/18  0358   WBC 3.81*   RBC 2.59*   HGB 8.2*   HCT 25.2*   PLT 28*   MCV 97   MCH 31.7*   MCHC 32.5     CMP:     Recent Labs  Lab 06/12/18  0358   GLU 98   CALCIUM 8.6*   ALBUMIN 2.7*   PROT 5.9*      K 3.8   CO2 23      BUN 48*   CREATININE 2.4*   ALKPHOS 131   ALT 14   AST 20   BILITOT 0.8     Labs within the past 24 hours have been reviewed.    Diagnostic Results:  I have personally reviewed all pertinent imaging studies.    Assessment/Plan:     * Abdominal pain    - no physical changes.  - relieved with po and iv pain medications.  - decrease oral pain meds to oxy 5-10 mg q 4 hr.  - added effexor and trazadone  - spoke with pain management, unable to nerve block due to platelets        Other pancytopenia    - hematology consulted - appreciate recs          Severe protein-calorie malnutrition    - encourage po intake.  - appetite improving.             Anemia of chronic renal failure    - H&H stable.           Liver transplanted    - LFTs stable.  - bilirubin stable.        Long-term use of immunosuppressant medication    - see prophylactic immunosuppression          Prophylactic immunotherapy    - continue rapamune, check rapa level weekly  - cya stopped for suspicion of TMA            Alcohol use disorder, severe, in early remission    - psychiatry consulted  - IOP suggested            ESRD Monday/Friday    - nephrology consulted.  - chronic hd mon/friday.  - nephrology does not feel HD necessary 6/8  - HD 6/11 - tolerated well         Ascites due to alcoholic cirrhosis    - diagnostic paracentesis 6/8.   - negative for infection. Wbc 12, segs 1%.   - monitor.              VTE Risk Mitigation         Ordered     heparin (porcine) injection 1,000 Units  As needed (PRN)      06/11/18 1313     heparin (porcine) injection 5,000 Units  Every 8  hours      06/07/18 2252     IP VTE HIGH RISK PATIENT  Once      06/07/18 2252     Place sequential compression device  Until discontinued      06/07/18 2252          The patients clinical status was discussed at multidisplinary rounds, involving transplant surgery, transplant medicine, pharmacy, nursing, nutrition, and social work    Discharge Planning:  Monitor kidney function --> may need outpt HD  Monitor HH needs vs rehab for deconditioned status      Seda Camacho NP  Liver Transplant  [facility]

## 2018-06-13 DIAGNOSIS — D69.1 ABNORMAL PLATELETS: ICD-10-CM

## 2018-06-13 DIAGNOSIS — Z94.4 LIVER TRANSPLANTED: Primary | ICD-10-CM

## 2018-06-13 DIAGNOSIS — D69.6 THROMBOCYTOPENIA: ICD-10-CM

## 2018-06-13 LAB
ALBUMIN SERPL BCP-MCNC: 2.7 G/DL
ALP SERPL-CCNC: 143 U/L
ALT SERPL W/O P-5'-P-CCNC: 16 U/L
ANION GAP SERPL CALC-SCNC: 13 MMOL/L
ANISOCYTOSIS BLD QL SMEAR: SLIGHT
ANISOCYTOSIS BLD QL SMEAR: SLIGHT
AST SERPL-CCNC: 20 U/L
BACTERIA BLD CULT: NORMAL
BACTERIA BLD CULT: NORMAL
BASOPHILS # BLD AUTO: 0 K/UL
BASOPHILS # BLD AUTO: 0 K/UL
BASOPHILS NFR BLD: 0 %
BASOPHILS NFR BLD: 0 %
BILIRUB SERPL-MCNC: 0.8 MG/DL
BLD PROD TYP BPU: NORMAL
BLOOD UNIT EXPIRATION DATE: NORMAL
BLOOD UNIT TYPE CODE: 6200
BLOOD UNIT TYPE: NORMAL
BUN SERPL-MCNC: 72 MG/DL
CALCIUM SERPL-MCNC: 8.6 MG/DL
CHLORIDE SERPL-SCNC: 102 MMOL/L
CO2 SERPL-SCNC: 23 MMOL/L
CODING SYSTEM: NORMAL
CREAT SERPL-MCNC: 2.9 MG/DL
DACRYOCYTES BLD QL SMEAR: ABNORMAL
DIFFERENTIAL METHOD: ABNORMAL
DIFFERENTIAL METHOD: ABNORMAL
DISPENSE STATUS: NORMAL
EOSINOPHIL # BLD AUTO: 0 K/UL
EOSINOPHIL # BLD AUTO: 0.1 K/UL
EOSINOPHIL NFR BLD: 0.3 %
EOSINOPHIL NFR BLD: 1.5 %
ERYTHROCYTE [DISTWIDTH] IN BLOOD BY AUTOMATED COUNT: 14.6 %
ERYTHROCYTE [DISTWIDTH] IN BLOOD BY AUTOMATED COUNT: 14.8 %
EST. GFR  (AFRICAN AMERICAN): 32.5 ML/MIN/1.73 M^2
EST. GFR  (NON AFRICAN AMERICAN): 28.1 ML/MIN/1.73 M^2
FIBRINOGEN PPP-MCNC: 186 MG/DL
FOLATE SERPL-MCNC: 3.4 NG/ML
GLUCOSE SERPL-MCNC: 99 MG/DL
HCT VFR BLD AUTO: 24.9 %
HCT VFR BLD AUTO: 27.5 %
HGB BLD-MCNC: 8.1 G/DL
HGB BLD-MCNC: 9.2 G/DL
HYPOCHROMIA BLD QL SMEAR: ABNORMAL
HYPOCHROMIA BLD QL SMEAR: ABNORMAL
IMM GRANULOCYTES # BLD AUTO: 0.03 K/UL
IMM GRANULOCYTES # BLD AUTO: 0.03 K/UL
IMM GRANULOCYTES NFR BLD AUTO: 0.8 %
IMM GRANULOCYTES NFR BLD AUTO: 0.9 %
INR PPP: 1
LDH SERPL L TO P-CCNC: 245 U/L
LYMPHOCYTES # BLD AUTO: 0.5 K/UL
LYMPHOCYTES # BLD AUTO: 0.7 K/UL
LYMPHOCYTES NFR BLD: 12.6 %
LYMPHOCYTES NFR BLD: 22.6 %
MAGNESIUM SERPL-MCNC: 1.5 MG/DL
MCH RBC QN AUTO: 31.8 PG
MCH RBC QN AUTO: 31.8 PG
MCHC RBC AUTO-ENTMCNC: 32.5 G/DL
MCHC RBC AUTO-ENTMCNC: 33.5 G/DL
MCV RBC AUTO: 95 FL
MCV RBC AUTO: 98 FL
MONOCYTES # BLD AUTO: 0.2 K/UL
MONOCYTES # BLD AUTO: 0.4 K/UL
MONOCYTES NFR BLD: 10.8 %
MONOCYTES NFR BLD: 4.7 %
NEUTROPHILS # BLD AUTO: 2.1 K/UL
NEUTROPHILS # BLD AUTO: 3 K/UL
NEUTROPHILS NFR BLD: 64.2 %
NEUTROPHILS NFR BLD: 81.6 %
NRBC BLD-RTO: 0 /100 WBC
NRBC BLD-RTO: 0 /100 WBC
PHOSPHATE SERPL-MCNC: 4.8 MG/DL
PLATELET # BLD AUTO: 26 K/UL
PLATELET # BLD AUTO: 37 K/UL
PLATELET BLD QL SMEAR: ABNORMAL
PLATELET BLD QL SMEAR: ABNORMAL
PMV BLD AUTO: 10.9 FL
PMV BLD AUTO: 9.7 FL
POIKILOCYTOSIS BLD QL SMEAR: SLIGHT
POTASSIUM SERPL-SCNC: 3.5 MMOL/L
PROT SERPL-MCNC: 5.8 G/DL
PROTHROMBIN TIME: 10.4 SEC
RBC # BLD AUTO: 2.55 M/UL
RBC # BLD AUTO: 2.89 M/UL
RETICS/RBC NFR AUTO: 1 %
SIROLIMUS BLD-MCNC: 6.1 NG/ML
SODIUM SERPL-SCNC: 138 MMOL/L
TRANS PLATPHERESIS VOL PATIENT: NORMAL ML
VIT B12 SERPL-MCNC: 334 PG/ML
WBC # BLD AUTO: 3.23 K/UL
WBC # BLD AUTO: 3.64 K/UL

## 2018-06-13 PROCEDURE — 25000003 PHARM REV CODE 250: Mod: NTX | Performed by: NURSE PRACTITIONER

## 2018-06-13 PROCEDURE — 25000003 PHARM REV CODE 250: Mod: NTX | Performed by: STUDENT IN AN ORGANIZED HEALTH CARE EDUCATION/TRAINING PROGRAM

## 2018-06-13 PROCEDURE — 85060 BLOOD SMEAR INTERPRETATION: CPT | Mod: NTX,,, | Performed by: PATHOLOGY

## 2018-06-13 PROCEDURE — 63600175 PHARM REV CODE 636 W HCPCS: Mod: NTX | Performed by: NURSE PRACTITIONER

## 2018-06-13 PROCEDURE — 82746 ASSAY OF FOLIC ACID SERUM: CPT | Mod: NTX

## 2018-06-13 PROCEDURE — 99233 SBSQ HOSP IP/OBS HIGH 50: CPT | Mod: NTX,,, | Performed by: NURSE PRACTITIONER

## 2018-06-13 PROCEDURE — 99223 1ST HOSP IP/OBS HIGH 75: CPT | Mod: NTX,,, | Performed by: PSYCHIATRY & NEUROLOGY

## 2018-06-13 PROCEDURE — 83735 ASSAY OF MAGNESIUM: CPT | Mod: NTX

## 2018-06-13 PROCEDURE — 63600175 PHARM REV CODE 636 W HCPCS: Mod: NTX | Performed by: STUDENT IN AN ORGANIZED HEALTH CARE EDUCATION/TRAINING PROGRAM

## 2018-06-13 PROCEDURE — 85025 COMPLETE CBC W/AUTO DIFF WBC: CPT | Mod: 91,NTX

## 2018-06-13 PROCEDURE — 85610 PROTHROMBIN TIME: CPT | Mod: NTX

## 2018-06-13 PROCEDURE — 36430 TRANSFUSION BLD/BLD COMPNT: CPT | Mod: NTX

## 2018-06-13 PROCEDURE — 85045 AUTOMATED RETICULOCYTE COUNT: CPT | Mod: NTX

## 2018-06-13 PROCEDURE — 82607 VITAMIN B-12: CPT | Mod: NTX

## 2018-06-13 PROCEDURE — 80195 ASSAY OF SIROLIMUS: CPT | Mod: NTX

## 2018-06-13 PROCEDURE — P9035 PLATELET PHERES LEUKOREDUCED: HCPCS | Mod: NTX

## 2018-06-13 PROCEDURE — 83615 LACTATE (LD) (LDH) ENZYME: CPT | Mod: NTX

## 2018-06-13 PROCEDURE — 20600001 HC STEP DOWN PRIVATE ROOM: Mod: NTX

## 2018-06-13 PROCEDURE — 36415 COLL VENOUS BLD VENIPUNCTURE: CPT | Mod: NTX

## 2018-06-13 PROCEDURE — 84100 ASSAY OF PHOSPHORUS: CPT | Mod: NTX

## 2018-06-13 PROCEDURE — 80053 COMPREHEN METABOLIC PANEL: CPT | Mod: NTX

## 2018-06-13 PROCEDURE — 85384 FIBRINOGEN ACTIVITY: CPT | Mod: NTX

## 2018-06-13 RX ORDER — HYDROCODONE BITARTRATE AND ACETAMINOPHEN 500; 5 MG/1; MG/1
TABLET ORAL
Status: DISCONTINUED | OUTPATIENT
Start: 2018-06-13 | End: 2018-06-16 | Stop reason: HOSPADM

## 2018-06-13 RX ORDER — FOLIC ACID 1 MG/1
1 TABLET ORAL DAILY
Qty: 30 TABLET | Refills: 2 | Status: SHIPPED | OUTPATIENT
Start: 2018-06-14 | End: 2019-01-07

## 2018-06-13 RX ORDER — SODIUM BICARBONATE 650 MG/1
650 TABLET ORAL 2 TIMES DAILY
Qty: 60 TABLET | Refills: 11 | Status: SHIPPED | OUTPATIENT
Start: 2018-06-13 | End: 2019-04-12

## 2018-06-13 RX ORDER — OXYCODONE HYDROCHLORIDE 5 MG/1
5 TABLET ORAL EVERY 4 HOURS PRN
Qty: 42 TABLET | Refills: 0 | Status: CANCELLED | OUTPATIENT
Start: 2018-06-13

## 2018-06-13 RX ORDER — GABAPENTIN 100 MG/1
300 CAPSULE ORAL NIGHTLY
Qty: 60 CAPSULE | Refills: 2
Start: 2018-06-13 | End: 2018-06-19

## 2018-06-13 RX ORDER — TRAZODONE HYDROCHLORIDE 50 MG/1
25 TABLET ORAL NIGHTLY
Qty: 15 TABLET | Refills: 11 | Status: SHIPPED | OUTPATIENT
Start: 2018-06-13 | End: 2018-07-03

## 2018-06-13 RX ORDER — FOLIC ACID 1 MG/1
1 TABLET ORAL DAILY
Status: DISCONTINUED | OUTPATIENT
Start: 2018-06-13 | End: 2018-06-16 | Stop reason: HOSPADM

## 2018-06-13 RX ORDER — VENLAFAXINE HYDROCHLORIDE 37.5 MG/1
37.5 CAPSULE, EXTENDED RELEASE ORAL DAILY
Qty: 30 CAPSULE | Refills: 11 | Status: SHIPPED | OUTPATIENT
Start: 2018-06-14 | End: 2018-07-06

## 2018-06-13 RX ADMIN — VENLAFAXINE HYDROCHLORIDE 37.5 MG: 37.5 CAPSULE, EXTENDED RELEASE ORAL at 08:06

## 2018-06-13 RX ADMIN — SODIUM BICARBONATE 650 MG TABLET 650 MG: at 08:06

## 2018-06-13 RX ADMIN — LEVOTHYROXINE SODIUM 75 MCG: 75 TABLET ORAL at 06:06

## 2018-06-13 RX ADMIN — SIROLIMUS 1 MG: 1 TABLET, SUGAR COATED ORAL at 08:06

## 2018-06-13 RX ADMIN — URSODIOL 300 MG: 300 CAPSULE ORAL at 08:06

## 2018-06-13 RX ADMIN — OXYCODONE HYDROCHLORIDE 5 MG: 5 TABLET ORAL at 08:06

## 2018-06-13 RX ADMIN — ACETAMINOPHEN 650 MG: 325 TABLET, FILM COATED ORAL at 03:06

## 2018-06-13 RX ADMIN — LEVETIRACETAM 500 MG: 500 TABLET ORAL at 08:06

## 2018-06-13 RX ADMIN — PREDNISONE 50 MG: 10 TABLET ORAL at 08:06

## 2018-06-13 RX ADMIN — OXYCODONE HYDROCHLORIDE 5 MG: 5 TABLET ORAL at 06:06

## 2018-06-13 RX ADMIN — SEVELAMER CARBONATE 1600 MG: 800 TABLET, FILM COATED ORAL at 11:06

## 2018-06-13 RX ADMIN — BISACODYL 10 MG: 5 TABLET, COATED ORAL at 08:06

## 2018-06-13 RX ADMIN — FOLIC ACID 1 MG: 1 TABLET ORAL at 03:06

## 2018-06-13 RX ADMIN — OXYCODONE HYDROCHLORIDE 5 MG: 5 TABLET ORAL at 11:06

## 2018-06-13 RX ADMIN — ACETAMINOPHEN 650 MG: 325 TABLET, FILM COATED ORAL at 08:06

## 2018-06-13 RX ADMIN — POLYETHYLENE GLYCOL 3350 17 G: 17 POWDER, FOR SOLUTION ORAL at 08:06

## 2018-06-13 RX ADMIN — ACETAMINOPHEN 650 MG: 325 TABLET, FILM COATED ORAL at 06:06

## 2018-06-13 RX ADMIN — OXYCODONE HYDROCHLORIDE 5 MG: 5 TABLET ORAL at 03:06

## 2018-06-13 RX ADMIN — FLUCONAZOLE 200 MG: 200 TABLET ORAL at 08:06

## 2018-06-13 RX ADMIN — TORSEMIDE 60 MG: 20 TABLET ORAL at 08:06

## 2018-06-13 RX ADMIN — GABAPENTIN 300 MG: 300 CAPSULE ORAL at 08:06

## 2018-06-13 RX ADMIN — DOCUSATE SODIUM 100 MG: 100 CAPSULE ORAL at 08:06

## 2018-06-13 RX ADMIN — SEVELAMER CARBONATE 1600 MG: 800 TABLET, FILM COATED ORAL at 08:06

## 2018-06-13 RX ADMIN — SEVELAMER CARBONATE 1600 MG: 800 TABLET, FILM COATED ORAL at 05:06

## 2018-06-13 RX ADMIN — AMLODIPINE BESYLATE 5 MG: 5 TABLET ORAL at 08:06

## 2018-06-13 RX ADMIN — TRAZODONE HYDROCHLORIDE 25 MG: 50 TABLET ORAL at 08:06

## 2018-06-13 RX ADMIN — FAMOTIDINE 20 MG: 20 TABLET ORAL at 08:06

## 2018-06-13 NOTE — PLAN OF CARE
Problem: Patient Care Overview  Goal: Plan of Care Review  Outcome: Ongoing (interventions implemented as appropriate)  - Platelets transfused.  Re-check CBC at 1530  - HD TThSat per Nephrology  - Pain control: continuing Oxy 5 mg Q4H PRN & Tylenol 650 mg scheduled Q8H   - Patient eats outside foods per preference, monitor intake  - Independent & ambulatory, bed & chair low & locked, call light in reach, nonslip socks in use, calls for assistance appropriately, mom at bedside  - Mother calling rehab facilities today to arrange for care once cleared by surgeons

## 2018-06-13 NOTE — SUBJECTIVE & OBJECTIVE
Scheduled Meds:   acetaminophen  650 mg Oral Q8H    amLODIPine  5 mg Oral Daily    bisacodyl  10 mg Oral Daily    docusate sodium  100 mg Oral BID    famotidine  20 mg Oral QHS    fluconazole  200 mg Oral Daily    folic acid  1 mg Oral Daily    gabapentin  300 mg Oral QHS    levETIRAcetam  500 mg Oral BID    levothyroxine  75 mcg Oral Before breakfast    polyethylene glycol  17 g Oral TID    predniSONE  50 mg Oral Daily    Followed by    [START ON 6/18/2018] predniSONE  40 mg Oral Daily    Followed by    [START ON 6/25/2018] predniSONE  30 mg Oral Daily    Followed by    [START ON 7/2/2018] predniSONE  20 mg Oral Daily    sevelamer carbonate  1,600 mg Oral TID WM    sirolimus  1 mg Oral Daily    sodium bicarbonate  1 tablet Oral BID    torsemide  60 mg Oral Daily    traZODone  25 mg Oral QHS    ursodiol  300 mg Oral BID    venlafaxine  37.5 mg Oral Daily     Continuous Infusions:  PRN Meds:sodium chloride, sodium chloride 0.9%, acetaminophen, heparin (porcine), oxyCODONE, ramelteon    Review of Systems   Constitutional: Positive for fatigue. Negative for activity change, appetite change, chills and fever.   HENT: Negative.    Eyes: Negative.    Respiratory: Negative for cough, chest tightness, shortness of breath and wheezing.    Cardiovascular: Negative for chest pain, palpitations and leg swelling.   Gastrointestinal: Positive for abdominal distention, abdominal pain and constipation. Negative for nausea and vomiting.   Genitourinary: Negative.    Musculoskeletal: Positive for back pain. Negative for arthralgias.   Skin: Negative.  Negative for wound.   Allergic/Immunologic: Positive for immunocompromised state.   Neurological: Negative.    Psychiatric/Behavioral: Positive for dysphoric mood and sleep disturbance. Negative for confusion and decreased concentration. The patient is nervous/anxious.      Objective:     Vital Signs (Most Recent):  Temp: 98.3 °F (36.8 °C) (06/13/18 1420)  Pulse:  100 (06/13/18 1420)  Resp: 16 (06/13/18 1420)  BP: 136/76 (06/13/18 1420)  SpO2: 99 % (06/13/18 1420) Vital Signs (24h Range):  Temp:  [97.4 °F (36.3 °C)-98.9 °F (37.2 °C)] 98.3 °F (36.8 °C)  Pulse:  [] 100  Resp:  [16-18] 16  SpO2:  [98 %-100 %] 99 %  BP: (124-136)/(71-78) 136/76     Weight: 64.2 kg (141 lb 8.6 oz)  Body mass index is 22.84 kg/m².    Intake/Output - Last 3 Shifts       06/11 0700 - 06/12 0659 06/12 0700 - 06/13 0659 06/13 0700 - 06/14 0659    P.O. 700 600     Blood   250    Other 600      Total Intake(mL/kg) 1300 (20.2) 600 (9.3) 250 (3.9)    Urine (mL/kg/hr) 1705 (1.1) 1300 (0.8)     Other 2600 (1.7)      Total Output 4305 1300      Net -3005 -700 +250           Stool Occurrence 2 x  0 x          Physical Exam   Constitutional: He is oriented to person, place, and time. Vital signs are normal. He appears well-developed.   Chronic ill appearing    HENT:   Head: Normocephalic and atraumatic.   Right Ear: Hearing, tympanic membrane and ear canal normal.   Left Ear: Hearing, tympanic membrane and ear canal normal.   Nose: Nose normal.   Mouth/Throat: Uvula is midline.   Eyes: Pupils are equal, round, and reactive to light.   Neck: Trachea normal, normal range of motion and full passive range of motion without pain. No JVD present.   Cardiovascular: Normal rate, regular rhythm, normal heart sounds, intact distal pulses and normal pulses.    No murmur heard.  Pulmonary/Chest: Effort normal and breath sounds normal. No respiratory distress. He has no wheezes. He has no rales. He exhibits no tenderness.   Diminished at bases.    Abdominal: Soft. Normal appearance and bowel sounds are normal. He exhibits distension. He exhibits no ascites. There is tenderness. There is no rigidity, no rebound, no guarding and no CVA tenderness.   Musculoskeletal: Normal range of motion. He exhibits edema (BLE trace). He exhibits no tenderness.   Neurological: He is alert and oriented to person, place, and time. He  has normal reflexes.   Skin: Skin is warm and dry.   R IJ permacath   Psychiatric: He has a normal mood and affect. His speech is normal and behavior is normal. Judgment and thought content normal. Cognition and memory are normal.   Nursing note and vitals reviewed.      Laboratory:  Immunosuppressants         Stop Route Frequency     sirolimus tablet 1 mg      -- Oral Daily        CBC:     Recent Labs  Lab 06/13/18  0421   WBC 3.23*   RBC 2.55*   HGB 8.1*   HCT 24.9*   PLT 26*   MCV 98   MCH 31.8*   MCHC 32.5     CMP:     Recent Labs  Lab 06/13/18  0421   GLU 99   CALCIUM 8.6*   ALBUMIN 2.7*   PROT 5.8*      K 3.5   CO2 23      BUN 72*   CREATININE 2.9*   ALKPHOS 143*   ALT 16   AST 20   BILITOT 0.8     Labs within the past 24 hours have been reviewed.    Diagnostic Results:  I have personally reviewed all pertinent imaging studies.

## 2018-06-13 NOTE — ASSESSMENT & PLAN NOTE
- no physical changes.  - relieved with po and iv pain medications.  - decrease oral pain meds to oxy 5-10 mg q 4 hr.  - added effexor and trazadone  - spoke with pain management, unable to nerve block due to platelets  - psychiatry on board to transition off narcotics

## 2018-06-13 NOTE — PROGRESS NOTES
Discharge Note: Patient tentatively scheduled to be discharged today to [facility] [id number].  SW presented to patient for follow up and discharge planning.  The patient was observed to be sitting in the recliner bedside alert, oriented x4 and communicative. The patient made appropriate eye contact when communicating with SW.  The patient was accompanied by his caregiver (mom) Sanjuanita ph# [phone]. The pt reported adequate coping stating that he is doing better and is working with his NP on a pain medication taper.  The pt reported that he has hopes to discharge today and if not today this week.  EZEKIEL discussed with the patient IOP options and inquired if the patient has contact a facility to arrange services.  The pt denied stating he has not contact a facility.  EZEKIEL discussed with the pt that in order to for him to be successful proper planning should take place.  EZEKIEL encouraged the patient to contact [facility] as he named this facility as the place he will enroll to inquire about their enrollment criteria.  The pt reported he was currently unaware.  EZEKIEL encouraged the patient to contact them to discuss and to follow up with EZEKIEL and Dr. Rogers in the clinic post discharge.  The pt agreed.  EZEKIEL discussed if [facility] was not an option did the pt have a back up. The pt denied.  EZEKIEL printed two additional resources 1.) [facility] Emergency Department Resource for Chemical Dependency ph#[phone] 2.) CDU (Chemical Dependency Unit) ph# [phone] for the patient to contact to inquire about admission criteria and schedule an appointment for intake.  Both pt and caregiver reported understanding.  SW to fax updated flow sheet to [facility]. Patient verbalized understanding and agreement with the information reviewed, social work availability, and how to access available resources if needed. SW remains available.

## 2018-06-13 NOTE — CONSULTS
"6/13/2018 9:51 AM  Jhonny Diana  1990  [id number]    Addiction Psychiatry Consult Note    Consult Requested By: Jw Valdivia MD    Chief Complaint / Reason for Consult:     Addiction     Assessment:     Jhonny Diana is a 27 y.o. male with a history of ESRD on HD, Liver Transplant 10/17, and ETOH abuse who [place] [facility] [place] due to abdominal pain.     Impression:  Alcohol use disorder, severe, in remission  Opiate dependence  Opiate use disorder, mild    Recommendations:     · Stop oxycodone 6/14/18 AM; discontinue all opiates  · Plan to start Suboxone 4 - 2 mg PO BID on Friday 6/15/18.  · Will need follow up with suboxone provider after discharge    Case discussed with staff psychiatrist, Blank Umana MD.    Subjective:     History of Present Illness:   Jhonny Diana is a 27 y.o. male with a history of ESRD on HD, Liver Transplant 10/17, and ETOH abuse who [place] [facility] [place] due to abdominal pain. Psychiatry has been consulted for "oltx with hx of laennecs cirrhosis & opiod addiction, currently trying to wean off narcsoltx with hx of laennecs cirrhosis & opiod addiction, currently trying to wean off narcs."    Patient states that he has been taking OxyContin 10 mg PO q 4 hours for the last few months. He reports he saw a pain management doctor but the doctor did not prescribe any opiates so he takes the medication that is prescribed by the hospital. He states only takes the medication for pain and never for recreational use. He denies any inappropriate usage besides taking an extra pill if his pain is not controlled; he reports he does this every couple of days. He states That he has never bought opiates from others or taken any pills that were not prescribed. He states he does not have cravings to use opiates when he is not in pain. He has never injected opiates. Patient denies any social consequences from his opiate use. He states in the past he would drink alcohol and at " most drank 1 fifth per day for about a year. He reports his last drink was in August 2017 and has not had any relapses since then. He currently does endorse problems with anxiety and insomnia. In the past he has taken Prozac and Zoloft but stopped taking them due to erectile dysfunction.      Psychiatric Review Of Systems - Is patient experiencing or having changes in:  sleep: yes  appetite: no  interest/pleasure/anhedonia: no  anxiety/panic: yes  guilty/hopelessness: no  S.I.B.s/risky behavior: no  Racing thoughts: no        Allergies:  Bactrim [sulfamethoxazole-trimethoprim]    Substance Abuse History:  Substance of Choice: OxyContin  Substances Used: no history of illicit drug use  History of IVDU?: No  Use of Alcohol: Yes - in remission  Tobacco: No  History of Withdrawals: No  History of Detox: No  Rehab History: No  Patient aware of biomedical complications? Yes    Abuse Criteria:  Failure at work, school or home:  No  Use when physically hazardous:  No  Legal Problems:  No  Use despite recurrent social/interpersonal problems:  No    Substance Use Disorder Criteria:  1. Often take in larger amounts or over a longer period of time than was intended: no  2. Persistent desire or unsuccessful efforts to cut down or control use: no  3. Great deal of time spent in activities necessary to obtain substance, use, or recover from effects: no  4. Craving/strong desire for substance or urge to use: no  5. Use resulting in failure to fulfill major role obligations at home, work or school: no  6. Social, occupational, recreational activities decreased because of use: no  7. Continued use despite having persistent or recurrent social or interpersonal problems cause or exaserbated by the substance: no  8. Recurrent use in situations in which it is physically hazardous: no  9. Use despite physical or psychological problems that are likely to have been caused or exacerbated by the substance: yes  10. Tolerance:  yes  11. Withdrawal: No  Mild (1-3), Moderate (4-5), Severe (?6)    Medical/Surgical History:  Past Medical History:   Diagnosis Date    Alcoholic hepatitis with ascites     Anemia     ESRD on dialysis     History of hematemesis 9/28/2017    Hypertension     Seizures     Thrombocytopenia     Transplanted liver     10/2017     Past Surgical History:   Procedure Laterality Date    APPENDECTOMY      ERCP      ESOPHAGOGASTRODUODENOSCOPY      LIVER TRANSPLANT      10/2017       Psychiatric History:  Previous Medication Trials: yes , prozac, zoloft  Previous Psychiatric Hospitalizations: no  Previous Suicide Attempts: no   History of Violence: no  Outpatient Psychiatrist: no    Social History:  Marital Status: not   Children: 0   Employment Status: currently employed  Education: some college  Special Ed: no   Lives alone in [place], [place]  History of abuse: no  Access to gun: no    Legal History:  Past Charges/Incarcerations: no,    Pending charges: no     Family Psychiatric History:   Father- Alcohol abuse, suicidal  Grandmother- depression, anxiety    Objective:     Current Medications:  Infusions:    Scheduled:   acetaminophen  650 mg Oral Q8H    amLODIPine  5 mg Oral Daily    bisacodyl  10 mg Oral Daily    docusate sodium  100 mg Oral BID    famotidine  20 mg Oral QHS    fluconazole  200 mg Oral Daily    gabapentin  300 mg Oral QHS    levETIRAcetam  500 mg Oral BID    levothyroxine  75 mcg Oral Before breakfast    polyethylene glycol  17 g Oral TID    predniSONE  50 mg Oral Daily    Followed by    [START ON 6/18/2018] predniSONE  40 mg Oral Daily    Followed by    [START ON 6/25/2018] predniSONE  30 mg Oral Daily    Followed by    [START ON 7/2/2018] predniSONE  20 mg Oral Daily    sevelamer carbonate  1,600 mg Oral TID WM    sirolimus  1 mg Oral Daily    sodium bicarbonate  1 tablet Oral BID    torsemide  60 mg Oral Daily    traZODone  25 mg Oral QHS    ursodiol  300 mg Oral BID     venlafaxine  37.5 mg Oral Daily     PRN:  sodium chloride 0.9%, acetaminophen, heparin (porcine), oxyCODONE, ramelteon    Home Medications:  Prior to Admission medications    Medication Sig Start Date End Date Taking? Authorizing Provider   amLODIPine (NORVASC) 5 MG tablet Take 1 tablet (5 mg total) by mouth once daily. 5/23/18  Yes Jw Valdivia MD   dronabinol (MARINOL) 2.5 MG capsule Take 1 capsule (2.5 mg total) by mouth every evening. 4/10/18  Yes Nathanael Medina MD   ergocalciferol (ERGOCALCIFEROL) 50,000 unit Cap Take 1 capsule (50,000 Units total) by mouth every 7 days. 4/9/18 6/26/18 Yes Solis Chapa MD   famotidine (PEPCID) 20 MG tablet Take 1 tablet (20 mg total) by mouth every evening. 5/23/18 5/23/19 Yes Jw Valdivia MD   fluconazole (DIFLUCAN) 200 MG Tab Take 1 tablet (200 mg total) by mouth once daily. STOP 6/23/18 5/24/18 6/23/18 Yes Jw Valdivia MD   gabapentin (NEURONTIN) 300 MG capsule Take 2 capsules (600 mg total) by mouth every evening. 6/7/18 9/5/18 Yes Jacquelyn Gant, MARCI   levETIRAcetam (KEPPRA) 500 MG Tab Take 1 tablet (500 mg total) by mouth 2 (two) times daily. 11/7/17 11/7/18 Yes Nathanael Medina MD   levothyroxine (SYNTHROID) 75 MCG tablet Take 1 tablet (75 mcg total) by mouth before breakfast. 12/21/17  Yes Nathanael Medina MD   polyethylene glycol (GLYCOLAX) 17 gram PwPk Mix 1 packet (17 g) in liquid and take by mouth 2 (two) times daily. 6/4/18  Yes Imer Badillo MD   predniSONE (DELTASONE) 20 MG tablet Take daily by mouth daily: 60mg 6/4-6/10, 50mg (2.5 tab) 6/11-6/17, 40mg (2 tab) 6/18-6/24, 30mg (1.5 tab) 6/25-7/1, 20 mg 7/2/18 6/4/18  Yes Imer Badillo MD   sevelamer carbonate (RENVELA) 800 mg Tab Take 2 tablets (1,600 mg total) by mouth 3 (three) times daily with meals. 6/4/18  Yes Imer Badillo MD   sirolimus (RAPAMUNE) 1 MG Tab Take 1 tablet (1 mg total) by mouth once daily. 6/5/18  Yes Imer Badillo MD   torsemide (DEMADEX) 20 MG Tab Take 3 tablets (60 mg  "total) by mouth once daily. 6/4/18  Yes Imer Badillo MD   ursodiol (ACTIGALL) 300 mg capsule Take 1 capsule (300 mg total) by mouth 2 (two) times daily. 4/9/18 4/9/19 Yes Michelle Herrera MD   bisacodyl (DULCOLAX) 5 mg EC tablet Take 2 tablets (10 mg total) by mouth once daily. 4/10/18   Solis Chapa MD   docusate sodium (COLACE) 100 MG capsule Take 1 capsule (100 mg total) by mouth 2 (two) times daily. 5/23/18   Jw Valdivia MD   ondansetron (ZOFRAN-ODT) 8 MG TbDL Take 1 tablet (8 mg total) by mouth every 8 (eight) hours as needed (nausea). 1/10/18   Quiana Carter MD   oxyCODONE (ROXICODONE) 10 mg Tab immediate release tablet Take 0.5-1 tablets (5-10 mg total) by mouth every 4 (four) hours as needed for Pain. 5/23/18   Mandy Crespo, NP     Vital Signs:  Temp:  [97.3 °F (36.3 °C)-98.7 °F (37.1 °C)]   Pulse:  []   Resp:  [16-18]   BP: (124-136)/(71-77)   SpO2:  [98 %-100 %]       Mental Status Exam:  Appearance: thin & gaunt looking, large abdomen   Behavior: normal, cooperative   Speech/Language: normal tone, normal rate, normal pitch, normal volume   Mood: "okay."   Affect: mood congruent   Thought Process:  normal and logical   Thought Content: normal, no suicidality, no homicidality, delusions, or paranoia   Orientation: person, place, situation   Cognition: memory > able to remember recent events- yes, able to remember remote events- yes   Insight: fair   Judgment: fair     Laboratory Data:  Recent Results (from the past 48 hour(s))   Comprehensive metabolic panel    Collection Time: 06/12/18  3:58 AM   Result Value Ref Range    Sodium 140 136 - 145 mmol/L    Potassium 3.8 3.5 - 5.1 mmol/L    Chloride 103 95 - 110 mmol/L    CO2 23 23 - 29 mmol/L    Glucose 98 70 - 110 mg/dL    BUN, Bld 48 (H) 6 - 20 mg/dL    Creatinine 2.4 (H) 0.5 - 1.4 mg/dL    Calcium 8.6 (L) 8.7 - 10.5 mg/dL    Total Protein 5.9 (L) 6.0 - 8.4 g/dL    Albumin 2.7 (L) 3.5 - 5.2 g/dL    Total Bilirubin 0.8 0.1 - 1.0 mg/dL    " Alkaline Phosphatase 131 55 - 135 U/L    AST 20 10 - 40 U/L    ALT 14 10 - 44 U/L    Anion Gap 14 8 - 16 mmol/L    eGFR if African American 40.9 (A) >60 mL/min/1.73 m^2    eGFR if non  35.4 (A) >60 mL/min/1.73 m^2   Magnesium    Collection Time: 06/12/18  3:58 AM   Result Value Ref Range    Magnesium 1.8 1.6 - 2.6 mg/dL   Phosphorus    Collection Time: 06/12/18  3:58 AM   Result Value Ref Range    Phosphorus 4.6 (H) 2.7 - 4.5 mg/dL   CBC auto differential    Collection Time: 06/12/18  3:58 AM   Result Value Ref Range    WBC 3.81 (L) 3.90 - 12.70 K/uL    RBC 2.59 (L) 4.60 - 6.20 M/uL    Hemoglobin 8.2 (L) 14.0 - 18.0 g/dL    Hematocrit 25.2 (L) 40.0 - 54.0 %    MCV 97 82 - 98 fL    MCH 31.7 (H) 27.0 - 31.0 pg    MCHC 32.5 32.0 - 36.0 g/dL    RDW 15.1 (H) 11.5 - 14.5 %    Platelets 28 (LL) 150 - 350 K/uL    MPV 11.1 9.2 - 12.9 fL    Immature Granulocytes 0.8 (H) 0.0 - 0.5 %    Gran # (ANC) 2.6 1.8 - 7.7 K/uL    Immature Grans (Abs) 0.03 0.00 - 0.04 K/uL    Lymph # 0.6 (L) 1.0 - 4.8 K/uL    Mono # 0.5 0.3 - 1.0 K/uL    Eos # 0.0 0.0 - 0.5 K/uL    Baso # 0.00 0.00 - 0.20 K/uL    nRBC 0 0 /100 WBC    Gran% 69.4 38.0 - 73.0 %    Lymph% 16.5 (L) 18.0 - 48.0 %    Mono% 12.3 4.0 - 15.0 %    Eosinophil% 1.0 0.0 - 8.0 %    Basophil% 0.0 0.0 - 1.9 %    Platelet Estimate Decreased (A)     Aniso Slight     Poik Slight     Tear Drop Cells Occasional     Differential Method Automated    Protime-INR    Collection Time: 06/12/18  3:58 AM   Result Value Ref Range    Prothrombin Time 10.3 9.0 - 12.5 sec    INR 1.0 0.8 - 1.2   Comprehensive metabolic panel    Collection Time: 06/13/18  4:21 AM   Result Value Ref Range    Sodium 138 136 - 145 mmol/L    Potassium 3.5 3.5 - 5.1 mmol/L    Chloride 102 95 - 110 mmol/L    CO2 23 23 - 29 mmol/L    Glucose 99 70 - 110 mg/dL    BUN, Bld 72 (H) 6 - 20 mg/dL    Creatinine 2.9 (H) 0.5 - 1.4 mg/dL    Calcium 8.6 (L) 8.7 - 10.5 mg/dL    Total Protein 5.8 (L) 6.0 - 8.4 g/dL    Albumin  2.7 (L) 3.5 - 5.2 g/dL    Total Bilirubin 0.8 0.1 - 1.0 mg/dL    Alkaline Phosphatase 143 (H) 55 - 135 U/L    AST 20 10 - 40 U/L    ALT 16 10 - 44 U/L    Anion Gap 13 8 - 16 mmol/L    eGFR if  32.5 (A) >60 mL/min/1.73 m^2    eGFR if non  28.1 (A) >60 mL/min/1.73 m^2   Magnesium    Collection Time: 06/13/18  4:21 AM   Result Value Ref Range    Magnesium 1.5 (L) 1.6 - 2.6 mg/dL   Phosphorus    Collection Time: 06/13/18  4:21 AM   Result Value Ref Range    Phosphorus 4.8 (H) 2.7 - 4.5 mg/dL   CBC auto differential    Collection Time: 06/13/18  4:21 AM   Result Value Ref Range    WBC 3.23 (L) 3.90 - 12.70 K/uL    RBC 2.55 (L) 4.60 - 6.20 M/uL    Hemoglobin 8.1 (L) 14.0 - 18.0 g/dL    Hematocrit 24.9 (L) 40.0 - 54.0 %    MCV 98 82 - 98 fL    MCH 31.8 (H) 27.0 - 31.0 pg    MCHC 32.5 32.0 - 36.0 g/dL    RDW 14.6 (H) 11.5 - 14.5 %    Platelets 26 (LL) 150 - 350 K/uL    MPV 9.7 9.2 - 12.9 fL    Immature Granulocytes 0.9 (H) 0.0 - 0.5 %    Gran # (ANC) 2.1 1.8 - 7.7 K/uL    Immature Grans (Abs) 0.03 0.00 - 0.04 K/uL    Lymph # 0.7 (L) 1.0 - 4.8 K/uL    Mono # 0.4 0.3 - 1.0 K/uL    Eos # 0.1 0.0 - 0.5 K/uL    Baso # 0.00 0.00 - 0.20 K/uL    nRBC 0 0 /100 WBC    Gran% 64.2 38.0 - 73.0 %    Lymph% 22.6 18.0 - 48.0 %    Mono% 10.8 4.0 - 15.0 %    Eosinophil% 1.5 0.0 - 8.0 %    Basophil% 0.0 0.0 - 1.9 %    Platelet Estimate Decreased (A)     Aniso Slight     Hypo Occasional     Differential Method Automated    Protime-INR    Collection Time: 06/13/18  4:21 AM   Result Value Ref Range    Prothrombin Time 10.4 9.0 - 12.5 sec    INR 1.0 0.8 - 1.2   Sirolimus level    Collection Time: 06/13/18  4:21 AM   Result Value Ref Range    Sirolimus Lvl 6.1 4.0 - 20.0 ng/mL   Lactate dehydrogenase    Collection Time: 06/13/18  4:21 AM   Result Value Ref Range     110 - 260 U/L   Reticulocytes    Collection Time: 06/13/18  4:21 AM   Result Value Ref Range    Retic 1.0 0.4 - 2.0 %   Vitamin B12    Collection  Time: 06/13/18  4:21 AM   Result Value Ref Range    Vitamin B-12 334 210 - 950 pg/mL   Folate    Collection Time: 06/13/18  4:21 AM   Result Value Ref Range    Folate 3.4 (L) 4.0 - 24.0 ng/mL   Fibrinogen    Collection Time: 06/13/18  4:21 AM   Result Value Ref Range    Fibrinogen 186 182 - 366 mg/dL   Pathologist Interpretation Differential    Collection Time: 06/13/18  4:21 AM   Result Value Ref Range    Pathologist Review Review required       Imaging:  Imaging Results          IR Paracentesis with Imaging (Final result)  Result time 06/08/18 16:43:46    Final result by Nilson Darby MD (06/08/18 16:43:46)                 Impression:      Successful diagnostic paracentesis as above.    Electronically signed by resident: Sri Gutierres  Date:    06/08/2018  Time:    15:33    Electronically signed by: Nilson Darby MD  Date:    06/08/2018  Time:    16:43             Narrative:    EXAMINATION:  IR PARACENTESIS WITH IMAGING    CLINICAL HISTORY:  ascites needing paracentesis;    TECHNIQUE:  Ultrasound-guided paracentesis.    COMPARISON:  None    FINDINGS:  Informed consent was obtained.  Area of skin over right lower quadrant was prepped and draped in sterile fashion.  Lidocaine was used for local anesthesia.  Under ultrasound guidance a micropuncture needle was advanced into peritoneal cavity and 20 cc of clear yellow fluid was aspirated and sent to laboratory for further evaluation.  The needle was removed and hemostasis was achieved.  The patient tolerated the procedure well.                               CT Abdomen Pelvis  Without Contrast (Final result)  Result time 06/08/18 00:34:17    Final result by Bong Raymond MD (06/08/18 00:34:17)                 Impression:      Large volume ascites, diffuse body wall anasarca, changes orthotopic liver transplantation, and biliary stenting with expected intrahepatic pneumobilia.    Moderate right pleural effusion with associated compressive atelectasis.    Slight  bladder wall thickening which may relate to ascites, chronic outlet obstruction, or cystitis.    Other incidental findings including dialysis catheter with tip in the SVC RA junction, splenomegaly, and stable small pericardial effusion.    Electronically signed by resident: Rajesh Torres  Date:    06/08/2018  Time:    00:01    Electronically signed by: Bong Raymond MD  Date:    06/08/2018  Time:    00:34             Narrative:    EXAMINATION:  CT ABDOMEN PELVIS WITHOUT CONTRAST    CLINICAL HISTORY:  Abdominal pain, unspecified;    TECHNIQUE:  Low dose axial images, sagittal and coronal reformations were obtained from the lung bases to the pubic symphysis, Oral contrast was not administered.    COMPARISON:  CT abdomen pelvis 05/28/2018    FINDINGS:  Heart: Stable and small pericardial effusion.  Dialysis catheter in place in the SVC RA junction.    Lung Bases: Moderate right pleural effusion with associated compressive atelectasis.  Otherwise no nodule or consolidation in the lungs.    Liver: Expected pneumobilia in this patient with a biliary stent.  Redemonstration of hyper attenuation of the liver.  Patient is status post liver transplantation.    Gallbladder: Surgically absent.    Bile Ducts: Biliary stent with expected intrahepatic pneumobilia.    Pancreas: No mass or peripancreatic fat stranding.    Spleen: Splenomegaly.  No focal lesions.    Adrenals: Unremarkable.    Kidneys/ Ureters: Unremarkable.    Bladder: Slight bladder wall thickening which may relate to ascites or may be related to chronic outlet obstruction or cystitis.    Reproductive organs: Unremarkable.    GI Tract/Mesentery: Bowels are generally nondistended.  There is a moderate stool burden throughout the colon.    Peritoneal Space: There is marked ascites.  No free air.    Retroperitoneum: No significant adenopathy.    Abdominal wall: Body wall anasarca    Vasculature: No significant atherosclerosis or aneurysm.    Bones: No acute fracture.   No significant degenerative changes.                               X-Ray Abdomen Flat And Erect (Final result)  Result time 06/07/18 19:26:17    Final result by Bong Raymond MD (06/07/18 19:26:17)                 Impression:      Nonobstructed bowel-gas pattern.    Cholecystectomy clips right upper quadrant.  Common duct stent with pneumobilia similar to June 2, 2018.      Electronically signed by: Bong Raymond MD  Date:    06/07/2018  Time:    19:26             Narrative:    EXAMINATION:  XR ABDOMEN FLAT AND ERECT    CLINICAL HISTORY:  Unspecified abdominal pain    TECHNIQUE:  Flat and erect AP views of the abdomen were preformed.    COMPARISON:  June 2, 2018    FINDINGS:  Cholecystectomy clips right upper quadrant.  Common duct stent with pneumobilia.  Bowel gas pattern is non-obstructive.  No evidence for pneumoperitoneum.  Regional osseous structures are unremarkable.                               X-Ray Chest PA And Lateral (Final result)  Result time 06/07/18 19:36:45    Final result by Karolina Varner MD (06/07/18 19:36:45)                 Impression:      #1. As above.      Electronically signed by: Karolina Varner MD  Date:    06/07/2018  Time:    19:36             Narrative:    EXAMINATION:  XR CHEST PA AND LATERAL    CLINICAL HISTORY:  abdominal pain;    TECHNIQUE:  PA and lateral views of the chest were performed.    COMPARISON:  None    FINDINGS:  Stable right-sided central venous catheter.  There is a layering mild right pleural effusion.  The bilateral lungs are otherwise clear.  There is nonspecific but grossly stable enlargement of the cardiac silhouette and pulmonary vasculature.  The heart and mediastinum are otherwise unremarkable.    No pneumothorax.  The osseous structures are unremarkable.

## 2018-06-13 NOTE — PROGRESS NOTES
[facility]  Liver Transplant  Progress Note    Patient Name: Jhonny Diana  MRN: [id number]  Admission Date: 2018  Hospital Length of Stay: 6 days  Code Status: Full Code  Primary Care Provider: Primary Doctor No  Post-Operative Day: 237    ORGAN:   LIVER  Disease Etiology: Acute Alcoholic Hepatitis  Donor Type:    - Brain Death  CDC High Risk:   No  Donor CMV Status:   Donor CMV Status: Positive  Donor HBcAB:   Negative  Donor HCV Status:   Negative  Whole or Partial: Whole Liver  Biliary Anastomosis: End to End  Arterial Anatomy: Standard  Subjective:     History of Present Illness:  28 y/o M who underwent liver Txp in Oct of 2017 present to the ER with diffuse abdominal pain and tachycardia.  Pt has recently been discharged with similar complaints.  While he has been dealing with this pain for some time, reports it is getting worse over the past day.  Pt has peen tolerating normal PO, urinating a good amount, and having normal bowel function.  Denies fevers or chills.  Pain is worse on the left flank.       Hospital Course:  Interval history: no acute events overnight. He continues to have abdominal pain, which is chronic in nature. Paracentesis  negative for infection. Wbc 12, segs 1%. CT reviewed by staff. No acute findings to explain pain. Pain management discussed pain block with pt, however not an option due to low platelets.  Started antidepressant and sleep aid.  Patient reports sleeping better at night.  Mother at bedside with patient.  Hematology consulted for persistent thrombocytopenia.  Transfusing pack of platelets and monitor response.  Response will dictate plan of care for thrombocytopenia.  Psychiatry also consulted to aid in transition off narcotics.  Nephrology following, tolerated HD . Monitor.     Scheduled Meds:   acetaminophen  650 mg Oral Q8H    amLODIPine  5 mg Oral Daily    bisacodyl  10 mg Oral Daily    docusate sodium  100 mg Oral BID     famotidine  20 mg Oral QHS    fluconazole  200 mg Oral Daily    folic acid  1 mg Oral Daily    gabapentin  300 mg Oral QHS    levETIRAcetam  500 mg Oral BID    levothyroxine  75 mcg Oral Before breakfast    polyethylene glycol  17 g Oral TID    predniSONE  50 mg Oral Daily    Followed by    [START ON 6/18/2018] predniSONE  40 mg Oral Daily    Followed by    [START ON 6/25/2018] predniSONE  30 mg Oral Daily    Followed by    [START ON 7/2/2018] predniSONE  20 mg Oral Daily    sevelamer carbonate  1,600 mg Oral TID WM    sirolimus  1 mg Oral Daily    sodium bicarbonate  1 tablet Oral BID    torsemide  60 mg Oral Daily    traZODone  25 mg Oral QHS    ursodiol  300 mg Oral BID    venlafaxine  37.5 mg Oral Daily     Continuous Infusions:  PRN Meds:sodium chloride, sodium chloride 0.9%, acetaminophen, heparin (porcine), oxyCODONE, ramelteon    Review of Systems   Constitutional: Positive for fatigue. Negative for activity change, appetite change, chills and fever.   HENT: Negative.    Eyes: Negative.    Respiratory: Negative for cough, chest tightness, shortness of breath and wheezing.    Cardiovascular: Negative for chest pain, palpitations and leg swelling.   Gastrointestinal: Positive for abdominal distention, abdominal pain and constipation. Negative for nausea and vomiting.   Genitourinary: Negative.    Musculoskeletal: Positive for back pain. Negative for arthralgias.   Skin: Negative.  Negative for wound.   Allergic/Immunologic: Positive for immunocompromised state.   Neurological: Negative.    Psychiatric/Behavioral: Positive for dysphoric mood and sleep disturbance. Negative for confusion and decreased concentration. The patient is nervous/anxious.      Objective:     Vital Signs (Most Recent):  Temp: 98.3 °F (36.8 °C) (06/13/18 1420)  Pulse: 100 (06/13/18 1420)  Resp: 16 (06/13/18 1420)  BP: 136/76 (06/13/18 1420)  SpO2: 99 % (06/13/18 1420) Vital Signs (24h Range):  Temp:  [97.4 °F (36.3  °C)-98.9 °F (37.2 °C)] 98.3 °F (36.8 °C)  Pulse:  [] 100  Resp:  [16-18] 16  SpO2:  [98 %-100 %] 99 %  BP: (124-136)/(71-78) 136/76     Weight: 64.2 kg (141 lb 8.6 oz)  Body mass index is 22.84 kg/m².    Intake/Output - Last 3 Shifts       06/11 0700 - 06/12 0659 06/12 0700 - 06/13 0659 06/13 0700 - 06/14 0659    P.O. 700 600     Blood   250    Other 600      Total Intake(mL/kg) 1300 (20.2) 600 (9.3) 250 (3.9)    Urine (mL/kg/hr) 1705 (1.1) 1300 (0.8)     Other 2600 (1.7)      Total Output 4305 1300      Net -3005 -700 +250           Stool Occurrence 2 x  0 x          Physical Exam   Constitutional: He is oriented to person, place, and time. Vital signs are normal. He appears well-developed.   Chronic ill appearing    HENT:   Head: Normocephalic and atraumatic.   Right Ear: Hearing, tympanic membrane and ear canal normal.   Left Ear: Hearing, tympanic membrane and ear canal normal.   Nose: Nose normal.   Mouth/Throat: Uvula is midline.   Eyes: Pupils are equal, round, and reactive to light.   Neck: Trachea normal, normal range of motion and full passive range of motion without pain. No JVD present.   Cardiovascular: Normal rate, regular rhythm, normal heart sounds, intact distal pulses and normal pulses.    No murmur heard.  Pulmonary/Chest: Effort normal and breath sounds normal. No respiratory distress. He has no wheezes. He has no rales. He exhibits no tenderness.   Diminished at bases.    Abdominal: Soft. Normal appearance and bowel sounds are normal. He exhibits distension. He exhibits no ascites. There is tenderness. There is no rigidity, no rebound, no guarding and no CVA tenderness.   Musculoskeletal: Normal range of motion. He exhibits edema (BLE trace). He exhibits no tenderness.   Neurological: He is alert and oriented to person, place, and time. He has normal reflexes.   Skin: Skin is warm and dry.   R IJ permacath   Psychiatric: He has a normal mood and affect. His speech is normal and behavior is  normal. Judgment and thought content normal. Cognition and memory are normal.   Nursing note and vitals reviewed.      Laboratory:  Immunosuppressants         Stop Route Frequency     sirolimus tablet 1 mg      -- Oral Daily        CBC:     Recent Labs  Lab 06/13/18 0421   WBC 3.23*   RBC 2.55*   HGB 8.1*   HCT 24.9*   PLT 26*   MCV 98   MCH 31.8*   MCHC 32.5     CMP:     Recent Labs  Lab 06/13/18 0421   GLU 99   CALCIUM 8.6*   ALBUMIN 2.7*   PROT 5.8*      K 3.5   CO2 23      BUN 72*   CREATININE 2.9*   ALKPHOS 143*   ALT 16   AST 20   BILITOT 0.8     Labs within the past 24 hours have been reviewed.    Diagnostic Results:  I have personally reviewed all pertinent imaging studies.    Assessment/Plan:     * Abdominal pain    - no physical changes.  - relieved with po and iv pain medications.  - decrease oral pain meds to oxy 5-10 mg q 4 hr.  - added effexor and trazadone  - spoke with pain management, unable to nerve block due to platelets  - psychiatry on board to transition off narcotics        Other pancytopenia    - hematology consulted - appreciate recs          Severe protein-calorie malnutrition    - encourage po intake.  - appetite improving.             Anemia of chronic renal failure    - H&H stable.           Biliary stricture of transplanted liver              S/P liver transplant    - LFTs stable.  - bilirubin stable.        Long-term use of immunosuppressant medication    - see prophylactic immunosuppression          Prophylactic immunotherapy    - continue rapamune, check rapa level weekly  - cya stopped for suspicion of TMA            Thrombocytopenia    - hematology following  - transfuse platelets and assess response          Alcohol use disorder, severe, in early remission    - psychiatry consulted - appreciate recs  - IOP suggested            Vitamin D deficiency              ESRD Monday/Friday    - nephrology consulted.  - chronic hd mon/friday.  - nephrology does not feel HD  necessary 6/8  - HD 6/11 - tolerated well         Ascites due to alcoholic cirrhosis    - diagnostic paracentesis 6/8.   - negative for infection. Wbc 12, segs 1%.   - monitor.              VTE Risk Mitigation         Ordered     heparin (porcine) injection 1,000 Units  As needed (PRN)      06/11/18 1313     IP VTE HIGH RISK PATIENT  Once      06/07/18 2252     Place sequential compression device  Until discontinued      06/07/18 2252          The patients clinical status was discussed at multidisplinary rounds, involving transplant surgery, transplant medicine, pharmacy, nursing, nutrition, and social work    Discharge Planning:  Monitor kidney function --> may need outpt HD  Monitor HH needs vs rehab for deconditioned status      Seda Camacho, NP  Liver Transplant  [facility]

## 2018-06-14 LAB
ALBUMIN SERPL BCP-MCNC: 2.8 G/DL
ALP SERPL-CCNC: 126 U/L
ALT SERPL W/O P-5'-P-CCNC: 20 U/L
ANION GAP SERPL CALC-SCNC: 10 MMOL/L
ANISOCYTOSIS BLD QL SMEAR: SLIGHT
AST SERPL-CCNC: 22 U/L
BASOPHILS # BLD AUTO: 0 K/UL
BASOPHILS NFR BLD: 0 %
BILIRUB SERPL-MCNC: 0.8 MG/DL
BUN SERPL-MCNC: 87 MG/DL
CALCIUM SERPL-MCNC: 8.8 MG/DL
CHLORIDE SERPL-SCNC: 103 MMOL/L
CO2 SERPL-SCNC: 26 MMOL/L
CREAT SERPL-MCNC: 3.3 MG/DL
DIFFERENTIAL METHOD: ABNORMAL
EOSINOPHIL # BLD AUTO: 0.1 K/UL
EOSINOPHIL NFR BLD: 1.9 %
ERYTHROCYTE [DISTWIDTH] IN BLOOD BY AUTOMATED COUNT: 14.7 %
EST. GFR  (AFRICAN AMERICAN): 27.8 ML/MIN/1.73 M^2
EST. GFR  (NON AFRICAN AMERICAN): 24.1 ML/MIN/1.73 M^2
GLUCOSE SERPL-MCNC: 95 MG/DL
HCT VFR BLD AUTO: 24 %
HGB BLD-MCNC: 8 G/DL
IMM GRANULOCYTES # BLD AUTO: 0.01 K/UL
IMM GRANULOCYTES NFR BLD AUTO: 0.4 %
INR PPP: 1
LYMPHOCYTES # BLD AUTO: 0.6 K/UL
LYMPHOCYTES NFR BLD: 23.4 %
MAGNESIUM SERPL-MCNC: 1.9 MG/DL
MCH RBC QN AUTO: 32.1 PG
MCHC RBC AUTO-ENTMCNC: 33.3 G/DL
MCV RBC AUTO: 96 FL
MONOCYTES # BLD AUTO: 0.3 K/UL
MONOCYTES NFR BLD: 10.9 %
NEUTROPHILS # BLD AUTO: 1.7 K/UL
NEUTROPHILS NFR BLD: 63.4 %
NRBC BLD-RTO: 0 /100 WBC
OVALOCYTES BLD QL SMEAR: ABNORMAL
PATH REV BLD -IMP: NORMAL
PATH REV BLD -IMP: NORMAL
PHOSPHATE SERPL-MCNC: 4.9 MG/DL
PLATELET # BLD AUTO: 26 K/UL
PLATELET BLD QL SMEAR: ABNORMAL
PMV BLD AUTO: 9.6 FL
POIKILOCYTOSIS BLD QL SMEAR: SLIGHT
POTASSIUM SERPL-SCNC: 3.8 MMOL/L
PROT SERPL-MCNC: 6 G/DL
PROTHROMBIN TIME: 10.1 SEC
RBC # BLD AUTO: 2.49 M/UL
SODIUM SERPL-SCNC: 139 MMOL/L
WBC # BLD AUTO: 2.65 K/UL

## 2018-06-14 PROCEDURE — 85025 COMPLETE CBC W/AUTO DIFF WBC: CPT | Mod: NTX

## 2018-06-14 PROCEDURE — 25000003 PHARM REV CODE 250: Mod: NTX | Performed by: NURSE PRACTITIONER

## 2018-06-14 PROCEDURE — 20600001 HC STEP DOWN PRIVATE ROOM: Mod: NTX

## 2018-06-14 PROCEDURE — 36415 COLL VENOUS BLD VENIPUNCTURE: CPT | Mod: NTX

## 2018-06-14 PROCEDURE — 63600175 PHARM REV CODE 636 W HCPCS: Mod: NTX | Performed by: NURSE PRACTITIONER

## 2018-06-14 PROCEDURE — 83735 ASSAY OF MAGNESIUM: CPT | Mod: NTX

## 2018-06-14 PROCEDURE — 85610 PROTHROMBIN TIME: CPT | Mod: NTX

## 2018-06-14 PROCEDURE — 80053 COMPREHEN METABOLIC PANEL: CPT | Mod: NTX

## 2018-06-14 PROCEDURE — 99233 SBSQ HOSP IP/OBS HIGH 50: CPT | Mod: NTX,,, | Performed by: NURSE PRACTITIONER

## 2018-06-14 PROCEDURE — 63600175 PHARM REV CODE 636 W HCPCS: Mod: NTX | Performed by: STUDENT IN AN ORGANIZED HEALTH CARE EDUCATION/TRAINING PROGRAM

## 2018-06-14 PROCEDURE — 25000003 PHARM REV CODE 250: Mod: NTX | Performed by: STUDENT IN AN ORGANIZED HEALTH CARE EDUCATION/TRAINING PROGRAM

## 2018-06-14 PROCEDURE — 84100 ASSAY OF PHOSPHORUS: CPT | Mod: NTX

## 2018-06-14 RX ORDER — OXYCODONE HYDROCHLORIDE 5 MG/1
5 TABLET ORAL ONCE
Status: COMPLETED | OUTPATIENT
Start: 2018-06-14 | End: 2018-06-14

## 2018-06-14 RX ORDER — BUPRENORPHINE AND NALOXONE 8; 2 MG/1; MG/1
1 FILM, SOLUBLE BUCCAL; SUBLINGUAL DAILY
Qty: 14 PACKET | Refills: 0 | Status: SHIPPED | OUTPATIENT
Start: 2018-06-14 | End: 2018-06-29

## 2018-06-14 RX ORDER — BUPRENORPHINE AND NALOXONE 2; .5 MG/1; MG/1
4 FILM, SOLUBLE BUCCAL; SUBLINGUAL 2 TIMES DAILY
Status: DISCONTINUED | OUTPATIENT
Start: 2018-06-15 | End: 2018-06-15

## 2018-06-14 RX ADMIN — SEVELAMER CARBONATE 1600 MG: 800 TABLET, FILM COATED ORAL at 09:06

## 2018-06-14 RX ADMIN — POLYETHYLENE GLYCOL 3350 17 G: 17 POWDER, FOR SOLUTION ORAL at 02:06

## 2018-06-14 RX ADMIN — GABAPENTIN 300 MG: 300 CAPSULE ORAL at 08:06

## 2018-06-14 RX ADMIN — ACETAMINOPHEN 650 MG: 325 TABLET, FILM COATED ORAL at 02:06

## 2018-06-14 RX ADMIN — LEVOTHYROXINE SODIUM 75 MCG: 75 TABLET ORAL at 06:06

## 2018-06-14 RX ADMIN — TRAZODONE HYDROCHLORIDE 25 MG: 50 TABLET ORAL at 08:06

## 2018-06-14 RX ADMIN — VENLAFAXINE HYDROCHLORIDE 37.5 MG: 37.5 CAPSULE, EXTENDED RELEASE ORAL at 09:06

## 2018-06-14 RX ADMIN — DOCUSATE SODIUM 100 MG: 100 CAPSULE ORAL at 08:06

## 2018-06-14 RX ADMIN — FOLIC ACID 1 MG: 1 TABLET ORAL at 09:06

## 2018-06-14 RX ADMIN — TORSEMIDE 60 MG: 20 TABLET ORAL at 09:06

## 2018-06-14 RX ADMIN — LEVETIRACETAM 500 MG: 500 TABLET ORAL at 09:06

## 2018-06-14 RX ADMIN — FAMOTIDINE 20 MG: 20 TABLET ORAL at 08:06

## 2018-06-14 RX ADMIN — FLUCONAZOLE 200 MG: 200 TABLET ORAL at 09:06

## 2018-06-14 RX ADMIN — URSODIOL 300 MG: 300 CAPSULE ORAL at 09:06

## 2018-06-14 RX ADMIN — POLYETHYLENE GLYCOL 3350 17 G: 17 POWDER, FOR SOLUTION ORAL at 09:06

## 2018-06-14 RX ADMIN — BISACODYL 10 MG: 5 TABLET, COATED ORAL at 09:06

## 2018-06-14 RX ADMIN — SEVELAMER CARBONATE 1600 MG: 800 TABLET, FILM COATED ORAL at 04:06

## 2018-06-14 RX ADMIN — ACETAMINOPHEN 650 MG: 325 TABLET, FILM COATED ORAL at 08:06

## 2018-06-14 RX ADMIN — AMLODIPINE BESYLATE 5 MG: 5 TABLET ORAL at 09:06

## 2018-06-14 RX ADMIN — SODIUM BICARBONATE 650 MG TABLET 650 MG: at 09:06

## 2018-06-14 RX ADMIN — OXYCODONE HYDROCHLORIDE 5 MG: 5 TABLET ORAL at 09:06

## 2018-06-14 RX ADMIN — SIROLIMUS 1 MG: 1 TABLET, SUGAR COATED ORAL at 09:06

## 2018-06-14 RX ADMIN — PREDNISONE 50 MG: 10 TABLET ORAL at 09:06

## 2018-06-14 RX ADMIN — SODIUM BICARBONATE 650 MG TABLET 650 MG: at 08:06

## 2018-06-14 RX ADMIN — POLYETHYLENE GLYCOL 3350 17 G: 17 POWDER, FOR SOLUTION ORAL at 08:06

## 2018-06-14 RX ADMIN — OXYCODONE HYDROCHLORIDE 5 MG: 5 TABLET ORAL at 03:06

## 2018-06-14 RX ADMIN — ACETAMINOPHEN 650 MG: 325 TABLET, FILM COATED ORAL at 06:06

## 2018-06-14 RX ADMIN — LEVETIRACETAM 500 MG: 500 TABLET ORAL at 08:06

## 2018-06-14 RX ADMIN — URSODIOL 300 MG: 300 CAPSULE ORAL at 08:06

## 2018-06-14 NOTE — PROGRESS NOTES
[facility]  Liver Transplant  Progress Note    Patient Name: Jhonny Diana  MRN: [id number]  Admission Date: 2018  Hospital Length of Stay: 7 days  Code Status: Full Code  Primary Care Provider: Primary Doctor No  Post-Operative Day: 238    ORGAN:   LIVER  Disease Etiology: Acute Alcoholic Hepatitis  Donor Type:    - Brain Death  CDC High Risk:   No  Donor CMV Status:   Donor CMV Status: Positive  Donor HBcAB:   Negative  Donor HCV Status:   Negative  Whole or Partial: Whole Liver  Biliary Anastomosis: End to End  Arterial Anatomy: Standard  Subjective:     History of Present Illness:  26 y/o M who underwent liver Txp in Oct of 2017 present to the ER with diffuse abdominal pain and tachycardia.  Pt has recently been discharged with similar complaints.  While he has been dealing with this pain for some time, reports it is getting worse over the past day.  Pt has peen tolerating normal PO, urinating a good amount, and having normal bowel function.  Denies fevers or chills.  Pain is worse on the left flank.       Hospital Course:  Interval history: no acute events overnight. He continues to have abdominal pain, which is chronic in nature. Paracentesis  negative for infection. Wbc 12, segs 1%. CT reviewed by staff. No acute findings to explain pain. Pain management discussed pain block with pt, however not an option due to low platelets.  Started antidepressant and sleep aid.  Patient reports sleeping better at night.  Mother at bedside with patient.  Hematology consulted for persistent thrombocytopenia.  Transfused pack of platelets and responsed.  Response indicates unlikely ITP.  Psychiatry also consulted to aid in transition off narcotics.  Will start suboxone in am per Dr. Umana.  Nephrology following, tolerated HD . Monitor.     Scheduled Meds:   acetaminophen  650 mg Oral Q8H    amLODIPine  5 mg Oral Daily    bisacodyl  10 mg Oral Daily    [START ON 6/15/2018]  buprenorphine-naloxone 2-0.5 mg  4 mg Sublingual BID    docusate sodium  100 mg Oral BID    famotidine  20 mg Oral QHS    fluconazole  200 mg Oral Daily    folic acid  1 mg Oral Daily    gabapentin  300 mg Oral QHS    levETIRAcetam  500 mg Oral BID    levothyroxine  75 mcg Oral Before breakfast    polyethylene glycol  17 g Oral TID    predniSONE  50 mg Oral Daily    Followed by    [START ON 6/18/2018] predniSONE  40 mg Oral Daily    Followed by    [START ON 6/25/2018] predniSONE  30 mg Oral Daily    Followed by    [START ON 7/2/2018] predniSONE  20 mg Oral Daily    sevelamer carbonate  1,600 mg Oral TID WM    sirolimus  1 mg Oral Daily    sodium bicarbonate  1 tablet Oral BID    torsemide  60 mg Oral Daily    traZODone  25 mg Oral QHS    ursodiol  300 mg Oral BID    venlafaxine  37.5 mg Oral Daily     Continuous Infusions:  PRN Meds:sodium chloride, sodium chloride 0.9%, acetaminophen, heparin (porcine)    Review of Systems   Constitutional: Positive for fatigue. Negative for activity change, appetite change, chills and fever.   HENT: Negative.    Eyes: Negative.    Respiratory: Negative for cough, chest tightness, shortness of breath and wheezing.    Cardiovascular: Negative for chest pain, palpitations and leg swelling.   Gastrointestinal: Positive for abdominal distention, abdominal pain and constipation. Negative for nausea and vomiting.   Genitourinary: Negative.    Musculoskeletal: Positive for back pain. Negative for arthralgias.   Skin: Negative.  Negative for wound.   Allergic/Immunologic: Positive for immunocompromised state.   Neurological: Negative.    Psychiatric/Behavioral: Positive for dysphoric mood and sleep disturbance. Negative for confusion and decreased concentration. The patient is nervous/anxious.      Objective:     Vital Signs (Most Recent):  Temp: 98.9 °F (37.2 °C) (06/14/18 1142)  Pulse: 101 (06/14/18 1142)  Resp: 16 (06/14/18 0730)  BP: 138/76 (06/14/18 1142)  SpO2: 99  % (06/14/18 1142) Vital Signs (24h Range):  Temp:  [97.5 °F (36.4 °C)-98.9 °F (37.2 °C)] 98.9 °F (37.2 °C)  Pulse:  [] 101  Resp:  [16-18] 16  SpO2:  [97 %-100 %] 99 %  BP: (122-141)/(65-76) 138/76     Weight: 57.3 kg (126 lb 5.2 oz)  Body mass index is 20.39 kg/m².    Intake/Output - Last 3 Shifts       06/12 0700 - 06/13 0659 06/13 0700 - 06/14 0659 06/14 0700 - 06/15 0659    P.O. 600 1560     Blood  250     Total Intake(mL/kg) 600 (9.3) 1810 (31.6)     Urine (mL/kg/hr) 1300 (0.8) 2750 (2)     Total Output 1300 2750      Net -700 -940             Urine Occurrence  1 x     Stool Occurrence  1 x           Physical Exam   Constitutional: He is oriented to person, place, and time. Vital signs are normal. He appears well-developed.   Chronic ill appearing    HENT:   Head: Normocephalic and atraumatic.   Right Ear: Hearing, tympanic membrane and ear canal normal.   Left Ear: Hearing, tympanic membrane and ear canal normal.   Nose: Nose normal.   Mouth/Throat: Uvula is midline.   Eyes: Pupils are equal, round, and reactive to light.   Neck: Trachea normal, normal range of motion and full passive range of motion without pain. No JVD present.   Cardiovascular: Normal rate, regular rhythm, normal heart sounds, intact distal pulses and normal pulses.    No murmur heard.  Pulmonary/Chest: Effort normal and breath sounds normal. No respiratory distress. He has no wheezes. He has no rales. He exhibits no tenderness.   Diminished at bases.    Abdominal: Soft. Normal appearance and bowel sounds are normal. He exhibits distension. He exhibits no ascites. There is tenderness. There is no rigidity, no rebound, no guarding and no CVA tenderness.   Musculoskeletal: Normal range of motion. He exhibits edema (BLE trace). He exhibits no tenderness.   Neurological: He is alert and oriented to person, place, and time. He has normal reflexes.   Skin: Skin is warm and dry.   R IJ permacath   Psychiatric: He has a normal mood and  affect. His speech is normal and behavior is normal. Judgment and thought content normal. Cognition and memory are normal.   Nursing note and vitals reviewed.      Laboratory:  Immunosuppressants         Stop Route Frequency     sirolimus tablet 1 mg      -- Oral Daily        CBC:     Recent Labs  Lab 06/14/18 0414   WBC 2.65*   RBC 2.49*   HGB 8.0*   HCT 24.0*   PLT 26*   MCV 96   MCH 32.1*   MCHC 33.3     CMP:     Recent Labs  Lab 06/14/18 0414   GLU 95   CALCIUM 8.8   ALBUMIN 2.8*   PROT 6.0      K 3.8   CO2 26      BUN 87*   CREATININE 3.3*   ALKPHOS 126   ALT 20   AST 22   BILITOT 0.8     Labs within the past 24 hours have been reviewed.    Diagnostic Results:  I have personally reviewed all pertinent imaging studies.    Assessment/Plan:     * Abdominal pain    - no physical changes.  - relieved with po and iv pain medications.  - decrease oral pain meds to oxy 5-10 mg q 4 hr.  - added effexor and trazadone  - spoke with pain management, unable to nerve block due to platelets  - psychiatry on board to transition off narcotics        Other pancytopenia    - hematology consulted - appreciate recs          Severe protein-calorie malnutrition    - encourage po intake.  - appetite improving.             Anemia of chronic renal failure    - H&H stable.           Biliary stricture of transplanted liver              Liver transplanted    - LFTs stable.  - bilirubin stable.        Long-term use of immunosuppressant medication    - see prophylactic immunosuppression          Prophylactic immunotherapy    - continue rapamune, check rapa level weekly  - cya stopped for suspicion of TMA            Thrombocytopenia    - hematology following  - transfused platelets with good response          Alcohol use disorder, severe, in early remission    - psychiatry consulted - appreciate recs  - IOP suggested            Vitamin D deficiency              ESRD Monday/Friday    - nephrology consulted.  - chronic hd  mon/friday.  - nephrology does not feel HD necessary 6/8  - HD 6/11 - tolerated well         Ascites due to alcoholic cirrhosis    - diagnostic paracentesis 6/8.   - negative for infection. Wbc 12, segs 1%.   - monitor.              VTE Risk Mitigation         Ordered     heparin (porcine) injection 1,000 Units  As needed (PRN)      06/11/18 1313     IP VTE HIGH RISK PATIENT  Once      06/07/18 2252     Place sequential compression device  Until discontinued      06/07/18 2252          The patients clinical status was discussed at multidisplinary rounds, involving transplant surgery, transplant medicine, pharmacy, nursing, nutrition, and social work    Discharge Planning:  Monitor kidney function --> may need outpt HD  Monitor HH needs vs rehab for deconditioned status      Seda Camacho, NP  Liver Transplant  [facility]

## 2018-06-14 NOTE — SUBJECTIVE & OBJECTIVE
Scheduled Meds:   acetaminophen  650 mg Oral Q8H    amLODIPine  5 mg Oral Daily    bisacodyl  10 mg Oral Daily    [START ON 6/15/2018] buprenorphine-naloxone 2-0.5 mg  4 mg Sublingual BID    docusate sodium  100 mg Oral BID    famotidine  20 mg Oral QHS    fluconazole  200 mg Oral Daily    folic acid  1 mg Oral Daily    gabapentin  300 mg Oral QHS    levETIRAcetam  500 mg Oral BID    levothyroxine  75 mcg Oral Before breakfast    polyethylene glycol  17 g Oral TID    predniSONE  50 mg Oral Daily    Followed by    [START ON 6/18/2018] predniSONE  40 mg Oral Daily    Followed by    [START ON 6/25/2018] predniSONE  30 mg Oral Daily    Followed by    [START ON 7/2/2018] predniSONE  20 mg Oral Daily    sevelamer carbonate  1,600 mg Oral TID WM    sirolimus  1 mg Oral Daily    sodium bicarbonate  1 tablet Oral BID    torsemide  60 mg Oral Daily    traZODone  25 mg Oral QHS    ursodiol  300 mg Oral BID    venlafaxine  37.5 mg Oral Daily     Continuous Infusions:  PRN Meds:sodium chloride, sodium chloride 0.9%, acetaminophen, heparin (porcine)    Review of Systems   Constitutional: Positive for fatigue. Negative for activity change, appetite change, chills and fever.   HENT: Negative.    Eyes: Negative.    Respiratory: Negative for cough, chest tightness, shortness of breath and wheezing.    Cardiovascular: Negative for chest pain, palpitations and leg swelling.   Gastrointestinal: Positive for abdominal distention, abdominal pain and constipation. Negative for nausea and vomiting.   Genitourinary: Negative.    Musculoskeletal: Positive for back pain. Negative for arthralgias.   Skin: Negative.  Negative for wound.   Allergic/Immunologic: Positive for immunocompromised state.   Neurological: Negative.    Psychiatric/Behavioral: Positive for dysphoric mood and sleep disturbance. Negative for confusion and decreased concentration. The patient is nervous/anxious.      Objective:     Vital Signs (Most  Recent):  Temp: 98.9 °F (37.2 °C) (06/14/18 1142)  Pulse: 101 (06/14/18 1142)  Resp: 16 (06/14/18 0730)  BP: 138/76 (06/14/18 1142)  SpO2: 99 % (06/14/18 1142) Vital Signs (24h Range):  Temp:  [97.5 °F (36.4 °C)-98.9 °F (37.2 °C)] 98.9 °F (37.2 °C)  Pulse:  [] 101  Resp:  [16-18] 16  SpO2:  [97 %-100 %] 99 %  BP: (122-141)/(65-76) 138/76     Weight: 57.3 kg (126 lb 5.2 oz)  Body mass index is 20.39 kg/m².    Intake/Output - Last 3 Shifts       06/12 0700 - 06/13 0659 06/13 0700 - 06/14 0659 06/14 0700 - 06/15 0659    P.O. 600 1560     Blood  250     Total Intake(mL/kg) 600 (9.3) 1810 (31.6)     Urine (mL/kg/hr) 1300 (0.8) 2750 (2)     Total Output 1300 2750      Net -700 -940             Urine Occurrence  1 x     Stool Occurrence  1 x           Physical Exam   Constitutional: He is oriented to person, place, and time. Vital signs are normal. He appears well-developed.   Chronic ill appearing    HENT:   Head: Normocephalic and atraumatic.   Right Ear: Hearing, tympanic membrane and ear canal normal.   Left Ear: Hearing, tympanic membrane and ear canal normal.   Nose: Nose normal.   Mouth/Throat: Uvula is midline.   Eyes: Pupils are equal, round, and reactive to light.   Neck: Trachea normal, normal range of motion and full passive range of motion without pain. No JVD present.   Cardiovascular: Normal rate, regular rhythm, normal heart sounds, intact distal pulses and normal pulses.    No murmur heard.  Pulmonary/Chest: Effort normal and breath sounds normal. No respiratory distress. He has no wheezes. He has no rales. He exhibits no tenderness.   Diminished at bases.    Abdominal: Soft. Normal appearance and bowel sounds are normal. He exhibits distension. He exhibits no ascites. There is tenderness. There is no rigidity, no rebound, no guarding and no CVA tenderness.   Musculoskeletal: Normal range of motion. He exhibits edema (BLE trace). He exhibits no tenderness.   Neurological: He is alert and oriented to  person, place, and time. He has normal reflexes.   Skin: Skin is warm and dry.   R IJ permacath   Psychiatric: He has a normal mood and affect. His speech is normal and behavior is normal. Judgment and thought content normal. Cognition and memory are normal.   Nursing note and vitals reviewed.      Laboratory:  Immunosuppressants         Stop Route Frequency     sirolimus tablet 1 mg      -- Oral Daily        CBC:     Recent Labs  Lab 06/14/18 0414   WBC 2.65*   RBC 2.49*   HGB 8.0*   HCT 24.0*   PLT 26*   MCV 96   MCH 32.1*   MCHC 33.3     CMP:     Recent Labs  Lab 06/14/18 0414   GLU 95   CALCIUM 8.8   ALBUMIN 2.8*   PROT 6.0      K 3.8   CO2 26      BUN 87*   CREATININE 3.3*   ALKPHOS 126   ALT 20   AST 22   BILITOT 0.8     Labs within the past 24 hours have been reviewed.    Diagnostic Results:  I have personally reviewed all pertinent imaging studies.

## 2018-06-14 NOTE — PLAN OF CARE
Problem: Patient Care Overview  Goal: Plan of Care Review  Outcome: Ongoing (interventions implemented as appropriate)  Pt AAOx4, VSS, in NAD throughout shift.  Pt independent and ambulates halls frequently, x2 so far today.  Pt's pain well-managed today; pt and pt's mother are looking forward to new pain program through addictive psych.  Pt in good spirits throughout shift.  Pt tolerating all medications and interventions well.  RN maintaining fall risk precautions throughout shift.  Pt denies pain or other need at this time; RN will continue to monitor, assess, and alter plan of care as needed until report given to oncoming night shift nurse.

## 2018-06-15 PROBLEM — K59.00 CONSTIPATION: Status: RESOLVED | Noted: 2018-01-20 | Resolved: 2018-06-15

## 2018-06-15 LAB
ALBUMIN SERPL BCP-MCNC: 2.8 G/DL
ALP SERPL-CCNC: 138 U/L
ALT SERPL W/O P-5'-P-CCNC: 21 U/L
ANION GAP SERPL CALC-SCNC: 15 MMOL/L
ANISOCYTOSIS BLD QL SMEAR: SLIGHT
AST SERPL-CCNC: 26 U/L
BACTERIA SPEC ANAEROBE CULT: NORMAL
BASOPHILS # BLD AUTO: 0 K/UL
BASOPHILS NFR BLD: 0 %
BILIRUB SERPL-MCNC: 0.8 MG/DL
BUN SERPL-MCNC: 91 MG/DL
CALCIUM SERPL-MCNC: 9.1 MG/DL
CHLORIDE SERPL-SCNC: 104 MMOL/L
CO2 SERPL-SCNC: 20 MMOL/L
CREAT SERPL-MCNC: 3.3 MG/DL
DIFFERENTIAL METHOD: ABNORMAL
EOSINOPHIL # BLD AUTO: 0.1 K/UL
EOSINOPHIL NFR BLD: 2 %
ERYTHROCYTE [DISTWIDTH] IN BLOOD BY AUTOMATED COUNT: 14.8 %
EST. GFR  (AFRICAN AMERICAN): 27.8 ML/MIN/1.73 M^2
EST. GFR  (NON AFRICAN AMERICAN): 24.1 ML/MIN/1.73 M^2
GLUCOSE SERPL-MCNC: 83 MG/DL
HCT VFR BLD AUTO: 25.8 %
HGB BLD-MCNC: 8.4 G/DL
HYPOCHROMIA BLD QL SMEAR: ABNORMAL
IMM GRANULOCYTES # BLD AUTO: 0.02 K/UL
IMM GRANULOCYTES NFR BLD AUTO: 0.8 %
INR PPP: 0.9
LYMPHOCYTES # BLD AUTO: 0.7 K/UL
LYMPHOCYTES NFR BLD: 29 %
MAGNESIUM SERPL-MCNC: 2 MG/DL
MCH RBC QN AUTO: 32.4 PG
MCHC RBC AUTO-ENTMCNC: 32.6 G/DL
MCV RBC AUTO: 100 FL
MONOCYTES # BLD AUTO: 0.3 K/UL
MONOCYTES NFR BLD: 10.2 %
NEUTROPHILS # BLD AUTO: 1.5 K/UL
NEUTROPHILS NFR BLD: 58 %
NRBC BLD-RTO: 0 /100 WBC
PHOSPHATE SERPL-MCNC: 5 MG/DL
PLATELET # BLD AUTO: 30 K/UL
PLATELET BLD QL SMEAR: ABNORMAL
PMV BLD AUTO: 11.4 FL
POTASSIUM SERPL-SCNC: 4 MMOL/L
PROT SERPL-MCNC: 6.1 G/DL
PROTHROMBIN TIME: 10 SEC
RBC # BLD AUTO: 2.59 M/UL
SODIUM SERPL-SCNC: 139 MMOL/L
WBC # BLD AUTO: 2.55 K/UL

## 2018-06-15 PROCEDURE — 25000003 PHARM REV CODE 250: Mod: NTX | Performed by: NURSE PRACTITIONER

## 2018-06-15 PROCEDURE — 85610 PROTHROMBIN TIME: CPT | Mod: NTX

## 2018-06-15 PROCEDURE — 99233 SBSQ HOSP IP/OBS HIGH 50: CPT | Mod: NTX,,, | Performed by: PSYCHIATRY & NEUROLOGY

## 2018-06-15 PROCEDURE — 63600175 PHARM REV CODE 636 W HCPCS: Mod: NTX | Performed by: INTERNAL MEDICINE

## 2018-06-15 PROCEDURE — 90935 HEMODIALYSIS ONE EVALUATION: CPT | Mod: NTX

## 2018-06-15 PROCEDURE — 80053 COMPREHEN METABOLIC PANEL: CPT | Mod: NTX

## 2018-06-15 PROCEDURE — 20600001 HC STEP DOWN PRIVATE ROOM: Mod: NTX

## 2018-06-15 PROCEDURE — 25000003 PHARM REV CODE 250: Mod: NTX | Performed by: STUDENT IN AN ORGANIZED HEALTH CARE EDUCATION/TRAINING PROGRAM

## 2018-06-15 PROCEDURE — 63600175 PHARM REV CODE 636 W HCPCS: Mod: NTX | Performed by: STUDENT IN AN ORGANIZED HEALTH CARE EDUCATION/TRAINING PROGRAM

## 2018-06-15 PROCEDURE — 99233 SBSQ HOSP IP/OBS HIGH 50: CPT | Mod: NTX,,, | Performed by: NURSE PRACTITIONER

## 2018-06-15 PROCEDURE — 90935 HEMODIALYSIS ONE EVALUATION: CPT | Mod: NTX,,, | Performed by: NURSE PRACTITIONER

## 2018-06-15 PROCEDURE — 85025 COMPLETE CBC W/AUTO DIFF WBC: CPT | Mod: NTX

## 2018-06-15 PROCEDURE — 84100 ASSAY OF PHOSPHORUS: CPT | Mod: NTX

## 2018-06-15 PROCEDURE — 36415 COLL VENOUS BLD VENIPUNCTURE: CPT | Mod: NTX

## 2018-06-15 PROCEDURE — 83735 ASSAY OF MAGNESIUM: CPT | Mod: NTX

## 2018-06-15 PROCEDURE — 63600175 PHARM REV CODE 636 W HCPCS: Mod: NTX | Performed by: NURSE PRACTITIONER

## 2018-06-15 RX ORDER — SODIUM CHLORIDE 9 MG/ML
INJECTION, SOLUTION INTRAVENOUS ONCE
Status: COMPLETED | OUTPATIENT
Start: 2018-06-15 | End: 2018-06-15

## 2018-06-15 RX ORDER — BUPRENORPHINE AND NALOXONE 2; .5 MG/1; MG/1
4 FILM, SOLUBLE BUCCAL; SUBLINGUAL DAILY
Status: DISCONTINUED | OUTPATIENT
Start: 2018-06-16 | End: 2018-06-16 | Stop reason: HOSPADM

## 2018-06-15 RX ORDER — SODIUM CHLORIDE 9 MG/ML
INJECTION, SOLUTION INTRAVENOUS
Status: DISCONTINUED | OUTPATIENT
Start: 2018-06-15 | End: 2018-06-16 | Stop reason: HOSPADM

## 2018-06-15 RX ADMIN — LEVOTHYROXINE SODIUM 75 MCG: 75 TABLET ORAL at 06:06

## 2018-06-15 RX ADMIN — FOLIC ACID 1 MG: 1 TABLET ORAL at 10:06

## 2018-06-15 RX ADMIN — HEPARIN SODIUM 1000 UNITS: 1000 INJECTION, SOLUTION INTRAVENOUS; SUBCUTANEOUS at 09:06

## 2018-06-15 RX ADMIN — AMLODIPINE BESYLATE 5 MG: 5 TABLET ORAL at 06:06

## 2018-06-15 RX ADMIN — ACETAMINOPHEN 650 MG: 325 TABLET, FILM COATED ORAL at 06:06

## 2018-06-15 RX ADMIN — ACETAMINOPHEN 650 MG: 325 TABLET, FILM COATED ORAL at 02:06

## 2018-06-15 RX ADMIN — FLUCONAZOLE 200 MG: 200 TABLET ORAL at 10:06

## 2018-06-15 RX ADMIN — ACETAMINOPHEN 650 MG: 325 TABLET, FILM COATED ORAL at 09:06

## 2018-06-15 RX ADMIN — TRAZODONE HYDROCHLORIDE 25 MG: 50 TABLET ORAL at 09:06

## 2018-06-15 RX ADMIN — SODIUM BICARBONATE 650 MG TABLET 650 MG: at 09:06

## 2018-06-15 RX ADMIN — FAMOTIDINE 20 MG: 20 TABLET ORAL at 09:06

## 2018-06-15 RX ADMIN — VENLAFAXINE HYDROCHLORIDE 37.5 MG: 37.5 CAPSULE, EXTENDED RELEASE ORAL at 06:06

## 2018-06-15 RX ADMIN — BUPRENORPHINE HYDROCHLORIDE, NALOXONE HYDROCHLORIDE 2 EACH: 2; .5 FILM, SOLUBLE BUCCAL; SUBLINGUAL at 06:06

## 2018-06-15 RX ADMIN — PREDNISONE 50 MG: 10 TABLET ORAL at 06:06

## 2018-06-15 RX ADMIN — SIROLIMUS 1 MG: 1 TABLET, SUGAR COATED ORAL at 06:06

## 2018-06-15 RX ADMIN — SODIUM BICARBONATE 650 MG TABLET 650 MG: at 10:06

## 2018-06-15 RX ADMIN — POLYETHYLENE GLYCOL 3350 17 G: 17 POWDER, FOR SOLUTION ORAL at 09:06

## 2018-06-15 RX ADMIN — URSODIOL 300 MG: 300 CAPSULE ORAL at 09:06

## 2018-06-15 RX ADMIN — SEVELAMER CARBONATE 1600 MG: 800 TABLET, FILM COATED ORAL at 05:06

## 2018-06-15 RX ADMIN — SODIUM CHLORIDE 300 ML: 9 INJECTION, SOLUTION INTRAVENOUS at 09:06

## 2018-06-15 RX ADMIN — POLYETHYLENE GLYCOL 3350 17 G: 17 POWDER, FOR SOLUTION ORAL at 02:06

## 2018-06-15 RX ADMIN — DOCUSATE SODIUM 100 MG: 100 CAPSULE ORAL at 09:06

## 2018-06-15 RX ADMIN — BISACODYL 10 MG: 5 TABLET, COATED ORAL at 10:06

## 2018-06-15 RX ADMIN — DOCUSATE SODIUM 100 MG: 100 CAPSULE ORAL at 10:06

## 2018-06-15 RX ADMIN — LEVETIRACETAM 500 MG: 500 TABLET ORAL at 09:06

## 2018-06-15 RX ADMIN — POLYETHYLENE GLYCOL 3350 17 G: 17 POWDER, FOR SOLUTION ORAL at 10:06

## 2018-06-15 RX ADMIN — URSODIOL 300 MG: 300 CAPSULE ORAL at 10:06

## 2018-06-15 RX ADMIN — TORSEMIDE 60 MG: 20 TABLET ORAL at 11:06

## 2018-06-15 RX ADMIN — LEVETIRACETAM 500 MG: 500 TABLET ORAL at 10:06

## 2018-06-15 RX ADMIN — SEVELAMER CARBONATE 1600 MG: 800 TABLET, FILM COATED ORAL at 11:06

## 2018-06-15 NOTE — PLAN OF CARE
Problem: Patient Care Overview  Goal: Plan of Care Review  Outcome: Ongoing (interventions implemented as appropriate)  Pt has call bell in reach, non slip socks on, and bedrails up x2. Pt has prn nausea and pain meds. Pt encouraged to wash hands

## 2018-06-15 NOTE — PROGRESS NOTES
Discharge Note:    EZEKIEL met with pt and his mother Sanjuanita Diana ([phone]) in the [facility] waiting room where pt was enjoying his lunch. Pt ordered chinese and appeared to eat all his meal which indicated great progress in pt's appetite. Pt presented as alert and oriented, communicative, bright, and in good spirits. Per medical rounding team, pt is scheduled to discharge tomorrow. Pt will discharge to his local lodging at [facility] [id number] under the care of self and mother. Pt's mother will transport him home. EZEKIEL has contacted [facility] ([phone]) to inform [facility]ano that pt will dc tomorrow and need to start back at their facility for Monday for his  chair at 3:30. EZEKIEL left Chano a VM and will attempt to contact him again later. Pt states he received his first Suboxone dose today and will continue to be followed by Dr. Umana with [facility].  Pt states he will also be enrolling in the [facility] to complete while staying local.  Pt and mother states they are not aware of a start date yet but do have the contact information for the [facility]. They state [facility] is suppose to contacting them with that information. EZEKIEL contacted [facility] to confirm. Sharon states pt has been placed at the top of their wait list to begin the program and has been approved to begin treatment.  [facility] will contact pt when they know of a start date or have a cancellation. Pt states he is excited to be discharging and happy with his new care and discharge plan. Pt reports coping well and denies any current mental health difficulties. No psychosocial barriers noted. Ezekiel remains available for education, resources, and support as appropriate.

## 2018-06-15 NOTE — SUBJECTIVE & OBJECTIVE
Scheduled Meds:   acetaminophen  650 mg Oral Q8H    amLODIPine  5 mg Oral Daily    bisacodyl  10 mg Oral Daily    docusate sodium  100 mg Oral BID    famotidine  20 mg Oral QHS    fluconazole  200 mg Oral Daily    folic acid  1 mg Oral Daily    gabapentin  300 mg Oral QHS    levETIRAcetam  500 mg Oral BID    levothyroxine  75 mcg Oral Before breakfast    polyethylene glycol  17 g Oral TID    predniSONE  50 mg Oral Daily    Followed by    [START ON 6/18/2018] predniSONE  40 mg Oral Daily    Followed by    [START ON 6/25/2018] predniSONE  30 mg Oral Daily    Followed by    [START ON 7/2/2018] predniSONE  20 mg Oral Daily    sevelamer carbonate  1,600 mg Oral TID WM    sirolimus  1 mg Oral Daily    sodium bicarbonate  1 tablet Oral BID    torsemide  60 mg Oral Daily    traZODone  25 mg Oral QHS    ursodiol  300 mg Oral BID    venlafaxine  37.5 mg Oral Daily     Continuous Infusions:  PRN Meds:sodium chloride, sodium chloride 0.9%, sodium chloride 0.9%, acetaminophen, heparin (porcine)    Review of Systems   Constitutional: Positive for fatigue. Negative for activity change, appetite change, chills and fever.   HENT: Negative.    Eyes: Negative.    Respiratory: Negative for cough, chest tightness, shortness of breath and wheezing.    Cardiovascular: Negative for chest pain, palpitations and leg swelling.   Gastrointestinal: Positive for abdominal distention, abdominal pain and constipation. Negative for nausea and vomiting.   Genitourinary: Negative.    Musculoskeletal: Positive for back pain. Negative for arthralgias.   Skin: Negative.  Negative for wound.   Allergic/Immunologic: Positive for immunocompromised state.   Neurological: Negative.    Psychiatric/Behavioral: Positive for dysphoric mood and sleep disturbance. Negative for confusion and decreased concentration. The patient is nervous/anxious.      Objective:     Vital Signs (Most Recent):  Temp: 98.1 °F (36.7 °C) (06/15/18 1119)  Pulse:  93 (06/15/18 1119)  Resp: 17 (06/15/18 1119)  BP: 126/73 (06/15/18 1119)  SpO2: 98 % (06/15/18 1119) Vital Signs (24h Range):  Temp:  [97.5 °F (36.4 °C)-99.5 °F (37.5 °C)] 98.1 °F (36.7 °C)  Pulse:  [] 93  Resp:  [14-19] 17  SpO2:  [98 %-100 %] 98 %  BP: (120-142)/(65-80) 126/73     Weight: 57.8 kg (127 lb 6.8 oz)  Body mass index is 20.57 kg/m².    Intake/Output - Last 3 Shifts       06/13 0700 - 06/14 0659 06/14 0700 - 06/15 0659 06/15 0700 - 06/16 0659    P.O. 1560 920     I.V. (mL/kg)  0 (0)     Blood 250      Other  0     Total Intake(mL/kg) 1810 (31.6) 920 (15.9)     Urine (mL/kg/hr) 2750 (2) 1430 (1)     Total Output 2750 1430      Net -940 -510             Urine Occurrence 1 x      Stool Occurrence 1 x 1 x 0 x          Physical Exam   Constitutional: He is oriented to person, place, and time. Vital signs are normal. He appears well-developed.   Chronic ill appearing    HENT:   Head: Normocephalic and atraumatic.   Right Ear: Hearing, tympanic membrane and ear canal normal.   Left Ear: Hearing, tympanic membrane and ear canal normal.   Nose: Nose normal.   Mouth/Throat: Uvula is midline.   Eyes: Pupils are equal, round, and reactive to light.   Neck: Trachea normal, normal range of motion and full passive range of motion without pain. No JVD present.   Cardiovascular: Normal rate, regular rhythm, normal heart sounds, intact distal pulses and normal pulses.    No murmur heard.  Pulmonary/Chest: Effort normal and breath sounds normal. No respiratory distress. He has no wheezes. He has no rales. He exhibits no tenderness.   Diminished at bases.    Abdominal: Soft. Normal appearance and bowel sounds are normal. He exhibits distension. He exhibits no ascites. There is tenderness. There is no rigidity, no rebound, no guarding and no CVA tenderness.   Musculoskeletal: Normal range of motion. He exhibits edema (BLE trace). He exhibits no tenderness.   Neurological: He is alert and oriented to person, place, and  time. He has normal reflexes.   Skin: Skin is warm and dry.   R IJ permacath   Psychiatric: He has a normal mood and affect. His speech is normal and behavior is normal. Judgment and thought content normal. Cognition and memory are normal.   Nursing note and vitals reviewed.      Laboratory:  Immunosuppressants         Stop Route Frequency     sirolimus tablet 1 mg      -- Oral Daily        CBC:     Recent Labs  Lab 06/15/18  0444   WBC 2.55*   RBC 2.59*   HGB 8.4*   HCT 25.8*   PLT 30*   *   MCH 32.4*   MCHC 32.6     CMP:     Recent Labs  Lab 06/15/18  0444   GLU 83   CALCIUM 9.1   ALBUMIN 2.8*   PROT 6.1      K 4.0   CO2 20*      BUN 91*   CREATININE 3.3*   ALKPHOS 138*   ALT 21   AST 26   BILITOT 0.8     Labs within the past 24 hours have been reviewed.    Diagnostic Results:  I have personally reviewed all pertinent imaging studies.

## 2018-06-15 NOTE — ASSESSMENT & PLAN NOTE
- nephrology consulted.  - chronic hd mon/friday.  - nephrology does not feel HD necessary 6/8  - HD 6/11 - tolerated well   - HD today

## 2018-06-15 NOTE — ASSESSMENT & PLAN NOTE
- hematology following  - transfused platelets with good response  - less likely ITP per HEmatology  - added folic acid  - monitor

## 2018-06-15 NOTE — PLAN OF CARE
Problem: Patient Care Overview  Goal: Plan of Care Review  Outcome: Ongoing (interventions implemented as appropriate)  Pt AAO x4 throughout shift. Suboxone 4mg given this AM and patient with adequate pain control throughout shift. Tylenol given as ordered. Pt had HD this AM with no fluid removal. Pt free from falls and injury throughout shift. Pt to be possibly discharged tomorrow.

## 2018-06-15 NOTE — PROGRESS NOTES
Dialysis tx completed. 3 hours. 0 fluid removal. Right CVC locked with heparin, capped and secured. Tolerated tx well. Report called to floor RN.

## 2018-06-15 NOTE — PROGRESS NOTES
6/15/2018 9:51 AM  Jhonny Diana  1990  [id number]    Addiction Psychiatry Consult Note    Consult Requested By: Jw Valdivia MD    Chief Complaint / Reason for Consult:     Addiction     Assessment:     Jhonny Diana is a 27 y.o. male with a history of ESRD on HD, Liver Transplant 10/17, and ETOH abuse who presented to the [facility] ED on 6/7/2018 due to abdominal pain.     Impression:  Alcohol use disorder, severe, in remission  Opiate dependence  Opiate use disorder, mild    Recommendations:     · Stopped oxycodone 6/14/18 AM  · Received Suboxone 4 - 2 mg PO this AM. Will provide additional dose if needed and then schedule tomorrow for discharge. Prescription sent to [facility].  · Patient will follow up at [facility] for addiction psychiatry    Case discussed with staff psychiatrist, Blank Umana MD.    Subjective:       Patient has no complaints this afternoon. He denies symptoms of opiate withdrawal including nausea, diarrhea, and sweating. He states he is not having any pain at this time. Denies ASE to starting suboxone. No pupillary dilation or piloerection on exam. Patient agrees to continue suboxone MAT. He agrees to follow up at [facility] next week.          Allergies:  Bactrim [sulfamethoxazole-trimethoprim]    Substance Abuse History:  Substance of Choice: OxyContin  Substances Used: no history of illicit drug use  History of IVDU?: No  Use of Alcohol: Yes - in remission  Tobacco: No  History of Withdrawals: No  History of Detox: No  Rehab History: No  Patient aware of biomedical complications? Yes    Abuse Criteria:  Failure at work, school or home:  No  Use when physically hazardous:  No  Legal Problems:  No  Use despite recurrent social/interpersonal problems:  No    Substance Use Disorder Criteria:  1. Often take in larger amounts or over a longer period of time than was intended: no  2. Persistent desire or unsuccessful efforts to cut down or control use: no  3. Great deal of time spent in  activities necessary to obtain substance, use, or recover from effects: no  4. Craving/strong desire for substance or urge to use: no  5. Use resulting in failure to fulfill major role obligations at home, work or school: no  6. Social, occupational, recreational activities decreased because of use: no  7. Continued use despite having persistent or recurrent social or interpersonal problems cause or exaserbated by the substance: no  8. Recurrent use in situations in which it is physically hazardous: no  9. Use despite physical or psychological problems that are likely to have been caused or exacerbated by the substance: yes  10. Tolerance: yes  11. Withdrawal: No  Mild (1-3), Moderate (4-5), Severe (?6)    Medical/Surgical History:  Past Medical History:   Diagnosis Date    Alcoholic hepatitis with ascites     Anemia     ESRD on dialysis     History of hematemesis 9/28/2017    Hypertension     Seizures     Thrombocytopenia     Transplanted liver     10/2017     Past Surgical History:   Procedure Laterality Date    APPENDECTOMY      ERCP      ESOPHAGOGASTRODUODENOSCOPY      LIVER TRANSPLANT      10/2017         Objective:     Current Medications:  Infusions:    Scheduled:   acetaminophen  650 mg Oral Q8H    amLODIPine  5 mg Oral Daily    bisacodyl  10 mg Oral Daily    buprenorphine-naloxone 2-0.5 mg  4 mg Sublingual BID    docusate sodium  100 mg Oral BID    famotidine  20 mg Oral QHS    fluconazole  200 mg Oral Daily    folic acid  1 mg Oral Daily    gabapentin  300 mg Oral QHS    levETIRAcetam  500 mg Oral BID    levothyroxine  75 mcg Oral Before breakfast    polyethylene glycol  17 g Oral TID    predniSONE  50 mg Oral Daily    Followed by    [START ON 6/18/2018] predniSONE  40 mg Oral Daily    Followed by    [START ON 6/25/2018] predniSONE  30 mg Oral Daily    Followed by    [START ON 7/2/2018] predniSONE  20 mg Oral Daily    sevelamer carbonate  1,600 mg Oral TID WM    sirolimus  1  mg Oral Daily    sodium bicarbonate  1 tablet Oral BID    torsemide  60 mg Oral Daily    traZODone  25 mg Oral QHS    ursodiol  300 mg Oral BID    venlafaxine  37.5 mg Oral Daily     PRN:  sodium chloride, sodium chloride 0.9%, sodium chloride 0.9%, acetaminophen, heparin (porcine)    Home Medications:  Prior to Admission medications    Medication Sig Start Date End Date Taking? Authorizing Provider   amLODIPine (NORVASC) 5 MG tablet Take 1 tablet (5 mg total) by mouth once daily. 5/23/18  Yes Jw Valdivia MD   dronabinol (MARINOL) 2.5 MG capsule Take 1 capsule (2.5 mg total) by mouth every evening. 4/10/18  Yes Nathanael Medina MD   ergocalciferol (ERGOCALCIFEROL) 50,000 unit Cap Take 1 capsule (50,000 Units total) by mouth every 7 days. 4/9/18 6/26/18 Yes Solis Chapa MD   famotidine (PEPCID) 20 MG tablet Take 1 tablet (20 mg total) by mouth every evening. 5/23/18 5/23/19 Yes Jw Valdivia MD   fluconazole (DIFLUCAN) 200 MG Tab Take 1 tablet (200 mg total) by mouth once daily. STOP 6/23/18 5/24/18 6/23/18 Yes Jw Valdivia MD   gabapentin (NEURONTIN) 300 MG capsule Take 2 capsules (600 mg total) by mouth every evening. 6/7/18 9/5/18 Yes Jacquelyn Gant, REDP   levETIRAcetam (KEPPRA) 500 MG Tab Take 1 tablet (500 mg total) by mouth 2 (two) times daily. 11/7/17 11/7/18 Yes Nathanael Medina MD   levothyroxine (SYNTHROID) 75 MCG tablet Take 1 tablet (75 mcg total) by mouth before breakfast. 12/21/17  Yes Nathanael Medina MD   polyethylene glycol (GLYCOLAX) 17 gram PwPk Mix 1 packet (17 g) in liquid and take by mouth 2 (two) times daily. 6/4/18  Yes Imer Badillo MD   predniSONE (DELTASONE) 20 MG tablet Take daily by mouth daily: 60mg 6/4-6/10, 50mg (2.5 tab) 6/11-6/17, 40mg (2 tab) 6/18-6/24, 30mg (1.5 tab) 6/25-7/1, 20 mg 7/2/18 6/4/18  Yes Imer Badillo MD   sevelamer carbonate (RENVELA) 800 mg Tab Take 2 tablets (1,600 mg total) by mouth 3 (three) times daily with meals. 6/4/18  Yes Imer Badillo MD  "  sirolimus (RAPAMUNE) 1 MG Tab Take 1 tablet (1 mg total) by mouth once daily. 6/5/18  Yes Imer Badillo MD   torsemide (DEMADEX) 20 MG Tab Take 3 tablets (60 mg total) by mouth once daily. 6/4/18  Yes Imer Badillo MD   ursodiol (ACTIGALL) 300 mg capsule Take 1 capsule (300 mg total) by mouth 2 (two) times daily. 4/9/18 4/9/19 Yes Michelle Herrera MD   bisacodyl (DULCOLAX) 5 mg EC tablet Take 2 tablets (10 mg total) by mouth once daily. 4/10/18   Solis Chapa MD   docusate sodium (COLACE) 100 MG capsule Take 1 capsule (100 mg total) by mouth 2 (two) times daily. 5/23/18   Jw Valdivia MD   ondansetron (ZOFRAN-ODT) 8 MG TbDL Take 1 tablet (8 mg total) by mouth every 8 (eight) hours as needed (nausea). 1/10/18   Quiana Carter MD   oxyCODONE (ROXICODONE) 10 mg Tab immediate release tablet Take 0.5-1 tablets (5-10 mg total) by mouth every 4 (four) hours as needed for Pain. 5/23/18   Mandy Crespo, NP     Vital Signs:  Temp:  [97.5 °F (36.4 °C)-99.5 °F (37.5 °C)]   Pulse:  []   Resp:  [14-19]   BP: (120-142)/(65-80)   SpO2:  [98 %-100 %]       Mental Status Exam:  Appearance: thin & gaunt looking, large abdomen   Behavior: normal, cooperative   Speech/Language: normal tone, normal rate, normal pitch, normal volume   Mood: "okay."   Affect: mood congruent   Thought Process:  normal and logical   Thought Content: normal, no suicidality, no homicidality, delusions, or paranoia   Orientation: person, place, situation   Cognition: memory > able to remember recent events- yes, able to remember remote events- yes   Insight: fair   Judgment: fair     Laboratory Data:  Recent Results (from the past 48 hour(s))   CBC auto differential    Collection Time: 06/13/18  3:39 PM   Result Value Ref Range    WBC 3.64 (L) 3.90 - 12.70 K/uL    RBC 2.89 (L) 4.60 - 6.20 M/uL    Hemoglobin 9.2 (L) 14.0 - 18.0 g/dL    Hematocrit 27.5 (L) 40.0 - 54.0 %    MCV 95 82 - 98 fL    MCH 31.8 (H) 27.0 - 31.0 pg    MCHC 33.5 32.0 - " 36.0 g/dL    RDW 14.8 (H) 11.5 - 14.5 %    Platelets 37 (LL) 150 - 350 K/uL    MPV 10.9 9.2 - 12.9 fL    Immature Granulocytes 0.8 (H) 0.0 - 0.5 %    Gran # (ANC) 3.0 1.8 - 7.7 K/uL    Immature Grans (Abs) 0.03 0.00 - 0.04 K/uL    Lymph # 0.5 (L) 1.0 - 4.8 K/uL    Mono # 0.2 (L) 0.3 - 1.0 K/uL    Eos # 0.0 0.0 - 0.5 K/uL    Baso # 0.00 0.00 - 0.20 K/uL    nRBC 0 0 /100 WBC    Gran% 81.6 (H) 38.0 - 73.0 %    Lymph% 12.6 (L) 18.0 - 48.0 %    Mono% 4.7 4.0 - 15.0 %    Eosinophil% 0.3 0.0 - 8.0 %    Basophil% 0.0 0.0 - 1.9 %    Platelet Estimate Decreased (A)     Aniso Slight     Poik Slight     Hypo Occasional     Tear Drop Cells Occasional     Differential Method Automated    Comprehensive metabolic panel    Collection Time: 06/14/18  4:14 AM   Result Value Ref Range    Sodium 139 136 - 145 mmol/L    Potassium 3.8 3.5 - 5.1 mmol/L    Chloride 103 95 - 110 mmol/L    CO2 26 23 - 29 mmol/L    Glucose 95 70 - 110 mg/dL    BUN, Bld 87 (H) 6 - 20 mg/dL    Creatinine 3.3 (H) 0.5 - 1.4 mg/dL    Calcium 8.8 8.7 - 10.5 mg/dL    Total Protein 6.0 6.0 - 8.4 g/dL    Albumin 2.8 (L) 3.5 - 5.2 g/dL    Total Bilirubin 0.8 0.1 - 1.0 mg/dL    Alkaline Phosphatase 126 55 - 135 U/L    AST 22 10 - 40 U/L    ALT 20 10 - 44 U/L    Anion Gap 10 8 - 16 mmol/L    eGFR if African American 27.8 (A) >60 mL/min/1.73 m^2    eGFR if non  24.1 (A) >60 mL/min/1.73 m^2   Magnesium    Collection Time: 06/14/18  4:14 AM   Result Value Ref Range    Magnesium 1.9 1.6 - 2.6 mg/dL   Phosphorus    Collection Time: 06/14/18  4:14 AM   Result Value Ref Range    Phosphorus 4.9 (H) 2.7 - 4.5 mg/dL   CBC auto differential    Collection Time: 06/14/18  4:14 AM   Result Value Ref Range    WBC 2.65 (L) 3.90 - 12.70 K/uL    RBC 2.49 (L) 4.60 - 6.20 M/uL    Hemoglobin 8.0 (L) 14.0 - 18.0 g/dL    Hematocrit 24.0 (L) 40.0 - 54.0 %    MCV 96 82 - 98 fL    MCH 32.1 (H) 27.0 - 31.0 pg    MCHC 33.3 32.0 - 36.0 g/dL    RDW 14.7 (H) 11.5 - 14.5 %    Platelets  26 (LL) 150 - 350 K/uL    MPV 9.6 9.2 - 12.9 fL    Immature Granulocytes 0.4 0.0 - 0.5 %    Gran # (ANC) 1.7 (L) 1.8 - 7.7 K/uL    Immature Grans (Abs) 0.01 0.00 - 0.04 K/uL    Lymph # 0.6 (L) 1.0 - 4.8 K/uL    Mono # 0.3 0.3 - 1.0 K/uL    Eos # 0.1 0.0 - 0.5 K/uL    Baso # 0.00 0.00 - 0.20 K/uL    nRBC 0 0 /100 WBC    Gran% 63.4 38.0 - 73.0 %    Lymph% 23.4 18.0 - 48.0 %    Mono% 10.9 4.0 - 15.0 %    Eosinophil% 1.9 0.0 - 8.0 %    Basophil% 0.0 0.0 - 1.9 %    Platelet Estimate Decreased (A)     Aniso Slight     Poik Slight     Ovalocytes Occasional     Differential Method Automated    Protime-INR    Collection Time: 06/14/18  4:15 AM   Result Value Ref Range    Prothrombin Time 10.1 9.0 - 12.5 sec    INR 1.0 0.8 - 1.2   Comprehensive metabolic panel    Collection Time: 06/15/18  4:44 AM   Result Value Ref Range    Sodium 139 136 - 145 mmol/L    Potassium 4.0 3.5 - 5.1 mmol/L    Chloride 104 95 - 110 mmol/L    CO2 20 (L) 23 - 29 mmol/L    Glucose 83 70 - 110 mg/dL    BUN, Bld 91 (H) 6 - 20 mg/dL    Creatinine 3.3 (H) 0.5 - 1.4 mg/dL    Calcium 9.1 8.7 - 10.5 mg/dL    Total Protein 6.1 6.0 - 8.4 g/dL    Albumin 2.8 (L) 3.5 - 5.2 g/dL    Total Bilirubin 0.8 0.1 - 1.0 mg/dL    Alkaline Phosphatase 138 (H) 55 - 135 U/L    AST 26 10 - 40 U/L    ALT 21 10 - 44 U/L    Anion Gap 15 8 - 16 mmol/L    eGFR if African American 27.8 (A) >60 mL/min/1.73 m^2    eGFR if non  24.1 (A) >60 mL/min/1.73 m^2   Magnesium    Collection Time: 06/15/18  4:44 AM   Result Value Ref Range    Magnesium 2.0 1.6 - 2.6 mg/dL   Phosphorus    Collection Time: 06/15/18  4:44 AM   Result Value Ref Range    Phosphorus 5.0 (H) 2.7 - 4.5 mg/dL   CBC auto differential    Collection Time: 06/15/18  4:44 AM   Result Value Ref Range    WBC 2.55 (L) 3.90 - 12.70 K/uL    RBC 2.59 (L) 4.60 - 6.20 M/uL    Hemoglobin 8.4 (L) 14.0 - 18.0 g/dL    Hematocrit 25.8 (L) 40.0 - 54.0 %     (H) 82 - 98 fL    MCH 32.4 (H) 27.0 - 31.0 pg    MCHC 32.6  32.0 - 36.0 g/dL    RDW 14.8 (H) 11.5 - 14.5 %    Platelets 30 (LL) 150 - 350 K/uL    MPV 11.4 9.2 - 12.9 fL    Immature Granulocytes 0.8 (H) 0.0 - 0.5 %    Gran # (ANC) 1.5 (L) 1.8 - 7.7 K/uL    Immature Grans (Abs) 0.02 0.00 - 0.04 K/uL    Lymph # 0.7 (L) 1.0 - 4.8 K/uL    Mono # 0.3 0.3 - 1.0 K/uL    Eos # 0.1 0.0 - 0.5 K/uL    Baso # 0.00 0.00 - 0.20 K/uL    nRBC 0 0 /100 WBC    Gran% 58.0 38.0 - 73.0 %    Lymph% 29.0 18.0 - 48.0 %    Mono% 10.2 4.0 - 15.0 %    Eosinophil% 2.0 0.0 - 8.0 %    Basophil% 0.0 0.0 - 1.9 %    Platelet Estimate Decreased (A)     Aniso Slight     Hypo Occasional     Differential Method Automated    Protime-INR    Collection Time: 06/15/18  4:44 AM   Result Value Ref Range    Prothrombin Time 10.0 9.0 - 12.5 sec    INR 0.9 0.8 - 1.2      Imaging:  Imaging Results          IR Paracentesis with Imaging (Final result)  Result time 06/08/18 16:43:46    Final result by Nilson Darby MD (06/08/18 16:43:46)                 Impression:      Successful diagnostic paracentesis as above.    Electronically signed by resident: Sri Gutierres  Date:    06/08/2018  Time:    15:33    Electronically signed by: Nilson Darby MD  Date:    06/08/2018  Time:    16:43             Narrative:    EXAMINATION:  IR PARACENTESIS WITH IMAGING    CLINICAL HISTORY:  ascites needing paracentesis;    TECHNIQUE:  Ultrasound-guided paracentesis.    COMPARISON:  None    FINDINGS:  Informed consent was obtained.  Area of skin over right lower quadrant was prepped and draped in sterile fashion.  Lidocaine was used for local anesthesia.  Under ultrasound guidance a micropuncture needle was advanced into peritoneal cavity and 20 cc of clear yellow fluid was aspirated and sent to laboratory for further evaluation.  The needle was removed and hemostasis was achieved.  The patient tolerated the procedure well.                               CT Abdomen Pelvis  Without Contrast (Final result)  Result time 06/08/18 00:34:17    Final  result by Bong Raymond MD (06/08/18 00:34:17)                 Impression:      Large volume ascites, diffuse body wall anasarca, changes orthotopic liver transplantation, and biliary stenting with expected intrahepatic pneumobilia.    Moderate right pleural effusion with associated compressive atelectasis.    Slight bladder wall thickening which may relate to ascites, chronic outlet obstruction, or cystitis.    Other incidental findings including dialysis catheter with tip in the SVC RA junction, splenomegaly, and stable small pericardial effusion.    Electronically signed by resident: Rajesh Torres  Date:    06/08/2018  Time:    00:01    Electronically signed by: Bong Raymond MD  Date:    06/08/2018  Time:    00:34             Narrative:    EXAMINATION:  CT ABDOMEN PELVIS WITHOUT CONTRAST    CLINICAL HISTORY:  Abdominal pain, unspecified;    TECHNIQUE:  Low dose axial images, sagittal and coronal reformations were obtained from the lung bases to the pubic symphysis, Oral contrast was not administered.    COMPARISON:  CT abdomen pelvis 05/28/2018    FINDINGS:  Heart: Stable and small pericardial effusion.  Dialysis catheter in place in the SVC RA junction.    Lung Bases: Moderate right pleural effusion with associated compressive atelectasis.  Otherwise no nodule or consolidation in the lungs.    Liver: Expected pneumobilia in this patient with a biliary stent.  Redemonstration of hyper attenuation of the liver.  Patient is status post liver transplantation.    Gallbladder: Surgically absent.    Bile Ducts: Biliary stent with expected intrahepatic pneumobilia.    Pancreas: No mass or peripancreatic fat stranding.    Spleen: Splenomegaly.  No focal lesions.    Adrenals: Unremarkable.    Kidneys/ Ureters: Unremarkable.    Bladder: Slight bladder wall thickening which may relate to ascites or may be related to chronic outlet obstruction or cystitis.    Reproductive organs: Unremarkable.    GI Tract/Mesentery:  Bowels are generally nondistended.  There is a moderate stool burden throughout the colon.    Peritoneal Space: There is marked ascites.  No free air.    Retroperitoneum: No significant adenopathy.    Abdominal wall: Body wall anasarca    Vasculature: No significant atherosclerosis or aneurysm.    Bones: No acute fracture.  No significant degenerative changes.                               X-Ray Abdomen Flat And Erect (Final result)  Result time 06/07/18 19:26:17    Final result by Bogn Raymond MD (06/07/18 19:26:17)                 Impression:      Nonobstructed bowel-gas pattern.    Cholecystectomy clips right upper quadrant.  Common duct stent with pneumobilia similar to June 2, 2018.      Electronically signed by: Bong Raymond MD  Date:    06/07/2018  Time:    19:26             Narrative:    EXAMINATION:  XR ABDOMEN FLAT AND ERECT    CLINICAL HISTORY:  Unspecified abdominal pain    TECHNIQUE:  Flat and erect AP views of the abdomen were preformed.    COMPARISON:  June 2, 2018    FINDINGS:  Cholecystectomy clips right upper quadrant.  Common duct stent with pneumobilia.  Bowel gas pattern is non-obstructive.  No evidence for pneumoperitoneum.  Regional osseous structures are unremarkable.                               X-Ray Chest PA And Lateral (Final result)  Result time 06/07/18 19:36:45    Final result by Karolina Varner MD (06/07/18 19:36:45)                 Impression:      #1. As above.      Electronically signed by: Karolina Varner MD  Date:    06/07/2018  Time:    19:36             Narrative:    EXAMINATION:  XR CHEST PA AND LATERAL    CLINICAL HISTORY:  abdominal pain;    TECHNIQUE:  PA and lateral views of the chest were performed.    COMPARISON:  None    FINDINGS:  Stable right-sided central venous catheter.  There is a layering mild right pleural effusion.  The bilateral lungs are otherwise clear.  There is nonspecific but grossly stable enlargement of the cardiac silhouette and pulmonary  vasculature.  The heart and mediastinum are otherwise unremarkable.    No pneumothorax.  The osseous structures are unremarkable.

## 2018-06-15 NOTE — PHYSICIAN QUERY
PT Name: Jhonny Diana  MR #: [id number]     Physician Query Form - Diagnosis Clarification      CDS/: Estelle Barakat               Contact information: [email or web address]     This form is a permanent document in the medical record.     Query Date: Suki 15, 2018    By submitting this query, we are merely seeking further clarification of documentation.  Please utilize your independent clinical judgment when addressing the question(s) below.     The medical record contains the following:      Findings Supporting Clinical Information Location in Medical Record   Biliary stricture of transplanted liver     He continues to have abdominal pain, which is chronic in nature. Paracentesis 6/8 negative for infection. Wbc 12, segs 1%. CT reviewed by staff. No acute findings to explain pain.     S/P liver transplant   - LFTs stable.  - bilirubin stable.      biliary stenting with expected intrahepatic pneumobilia. Transplant PN 6/13    Transplant PN 6/13          Transplant PN 6/13          CT 6/7     Please clarify if the Biliary stricture of transplanted liver________ diagnosis has been:    [  ] Ruled In  [  ] Ruled In, Now Resolved  [  ] Ruled Out  [  ] Clinically insignificant  [  ] Clinically undetermined  [x  ] Other/Clarification of findings (please specify)_____ hx of biliary stricture with stent, not a current hospital problem________________    Please document in your progress notes daily for the duration of treatment, until resolved, and include in your discharge summary.

## 2018-06-15 NOTE — DISCHARGE SUMMARY
[facility]  Liver Transplant  Discharge Summary      Patient Name: Jhonny Diana  MRN: [id number]  Admission Date: 2018  Hospital Length of Stay: 9 days  Discharge Date and Time:  2018 3:30 PM  Attending Physician: Adriana att. providers found   Discharging Provider: Citlali Bridges PA-C  Primary Care Provider: Primary Doctor Adriana  Post-Operative Day: 239     ORGAN:   LIVER  Disease Etiology: Acute Alcoholic Hepatitis  Donor Type:    - Brain Death  CDC High Risk:   No  Donor CMV Status:   Donor CMV Status: Positive  Donor HBcAB:   Negative  Donor HCV Status:   Negative  Whole or Partial: Whole Liver  Biliary Anastomosis: End to End  Arterial Anatomy: Standard    HPI:   26 y/o M who underwent liver Txp in Oct of 2017 present to the ER with diffuse abdominal pain and tachycardia.  Pt has recently been discharged with similar complaints.  While he has been dealing with this pain for some time, reports it is getting worse over the past day.  Pt has peen tolerating normal PO, urinating a good amount, and having normal bowel function.  Denies fevers or chills.  Pain is worse on the left flank.       * No surgery found *     Hospital Course:    He continues to have abdominal pain, which is chronic in nature and likely associated with opiate use disorder.  Paracentesis  negative for infection. Wbc 12, segs 1%. CT reviewed by staff. No acute findings to explain pain. Pain management discussed pain block with pt, however this was not an option due to low platelets.  Started antidepressant and sleep aid.  Patient reports sleeping better at night.    Hematology consulted for persistent thrombocytopenia.  Transfused pack of platelets and responded. Response indicates unlikely ITP.      Psychiatry was also consulted while inpatient for opiate use disorder and to aid in transition off narcotics. He was transitioned off narcotics   He was started on suboxone per Dr. Umana. Patient tolerating  transition off narcotics well with improvement in abdominal pain. Per psych, patient is to follow up with [facility] for IOP treatment, tentatively later next week.     On day of discharge, patient feeling well without complaint. He is stable and ready for discharge. He will follow up with labs on Thursday 6/21/18. Patient verbalize his understanding prior to discharge.     Final Active Diagnoses:    Diagnosis Date Noted POA    PRINCIPAL PROBLEM:  Abdominal pain [R10.9] 05/27/2018 Yes    Other pancytopenia [D61.818] 05/12/2018 Yes    Thrombocytopenia [D69.6] 05/12/2018 Yes    Anemia of chronic renal failure [N18.9, D63.1]  Yes    Alcohol use disorder, severe, in early remission [F10.21] 04/23/2018 Yes    ESRD Monday/Friday [N18.6] 01/13/2018 Yes    Severe protein-calorie malnutrition [E43] 01/11/2018 Yes    Ascites due to alcoholic cirrhosis [K70.31] 11/14/2017 Yes    Liver transplanted [Z94.4] 10/20/2017 Not Applicable     Chronic    Long-term use of immunosuppressant medication [Z79.899] 10/20/2017 Not Applicable     Chronic    Prophylactic immunotherapy [Z29.8] 10/20/2017 Not Applicable     Chronic      Problems Resolved During this Admission:    Diagnosis Date Noted Date Resolved POA       Consults         Status Ordering Provider     Inpatient consult to Hematology  Once     Provider:  (Not yet assigned)    Completed VANDANA SAMUEL     Inpatient consult to Nephrology  Once     Provider:  (Not yet assigned)    Completed JOSÉ MIGUEL DURAN     Inpatient consult to PICC team ([facility])  Once     Provider:  (Not yet assigned)    Completed INDRA DANIEL     Inpatient consult to Psychiatry  Once     Provider:  (Not yet assigned)    Completed VANDANA SAMUEL          Pending Diagnostic Studies:     None        Significant Diagnostic Studies: Labs:   CMP     Recent Labs  Lab 06/15/18  0444 06/16/18  0408    143   K 4.0 3.8    104   CO2 20* 29   GLU 83 83   BUN 91* 47*   CREATININE 3.3* 2.4*   CALCIUM 9.1  8.5*   PROT 6.1 5.8*   ALBUMIN 2.8* 2.7*   BILITOT 0.8 0.8   ALKPHOS 138* 132   AST 26 27   ALT 21 22   ANIONGAP 15 10   ESTGFRAFRICA 27.8* 40.9*   EGFRNONAA 24.1* 35.4*    and CBC     Recent Labs  Lab 06/15/18  0444 06/16/18  0408   WBC 2.55* 2.32*   HGB 8.4* 8.1*   HCT 25.8* 26.3*   PLT 30* 26*       The patients clinical status was discussed at multidisplinary rounds, involving transplant surgery, transplant medicine, pharmacy, nursing, nutrition, and social work    Discharged Condition: good    Disposition: Home or Self Care    Follow Up:    Patient Instructions:     Diet Adult Regular     Activity as tolerated     Notify your health care provider if you experience any of the following:  temperature >100.4     Notify your health care provider if you experience any of the following:  persistent nausea and vomiting or diarrhea     Notify your health care provider if you experience any of the following:  severe uncontrolled pain     Notify your health care provider if you experience any of the following:  redness, tenderness, or signs of infection (pain, swelling, redness, odor or green/yellow discharge around incision site)     Notify your health care provider if you experience any of the following:  difficulty breathing or increased cough     Notify your health care provider if you experience any of the following:  severe persistent headache     Notify your health care provider if you experience any of the following:  worsening rash     Notify your health care provider if you experience any of the following:  persistent dizziness, light-headedness, or visual disturbances     Notify your health care provider if you experience any of the following:  increased confusion or weakness     Notify your health care provider if you experience any of the following:   Order Comments: For any other concerning signs or symptoms.       Medications:  Reconciled Home Medications:      Medication List      START taking these  medications    folic acid 1 MG tablet  Commonly known as:  FOLVITE  Take 1 tablet (1 mg total) by mouth once daily.     sodium bicarbonate 650 MG tablet  Take 1 tablet (650 mg total) by mouth 2 (two) times daily.     SUBOXONE 8-2 mg Film  Generic drug:  buprenorphine-naloxone  Place 1 packet (1 each total) under the tongue once daily.     traZODone 50 MG tablet  Commonly known as:  DESYREL  Take ½ tablet (25 mg total) by mouth every evening.     venlafaxine 37.5 MG 24 hr capsule  Commonly known as:  EFFEXOR-XR  Take 1 capsule (37.5 mg total) by mouth once daily.        CHANGE how you take these medications    gabapentin 100 MG capsule  Commonly known as:  NEURONTIN  Take 3 capsules (300 mg total) by mouth every evening.  What changed:  · medication strength  · how much to take  · Another medication with the same name was removed. Continue taking this medication, and follow the directions you see here.        CONTINUE taking these medications    amLODIPine 5 MG tablet  Commonly known as:  NORVASC  Take 1 tablet (5 mg total) by mouth once daily.     bisacodyl 5 mg EC tablet  Commonly known as:  DULCOLAX  Take 2 tablets (10 mg total) by mouth once daily.     docusate sodium 100 MG capsule  Commonly known as:  COLACE  Take 1 capsule (100 mg total) by mouth 2 (two) times daily.     ergocalciferol 50,000 unit Cap  Commonly known as:  ERGOCALCIFEROL  Take 1 capsule (50,000 Units total) by mouth every 7 days.     famotidine 20 MG tablet  Commonly known as:  PEPCID  Take 1 tablet (20 mg total) by mouth every evening.     fluconazole 200 MG Tab  Commonly known as:  DIFLUCAN  Take 1 tablet (200 mg total) by mouth once daily. STOP 6/23/18     levETIRAcetam 500 MG Tab  Commonly known as:  KEPPRA  Take 1 tablet (500 mg total) by mouth 2 (two) times daily.     levothyroxine 75 MCG tablet  Commonly known as:  SYNTHROID  Take 1 tablet (75 mcg total) by mouth before breakfast.     ondansetron 8 MG Tbdl  Commonly known as:   ZOFRAN-ODT  Take 1 tablet (8 mg total) by mouth every 8 (eight) hours as needed (nausea).     polyethylene glycol 17 gram Pwpk  Commonly known as:  GLYCOLAX  Mix 1 packet (17 g) in liquid and take by mouth 2 (two) times daily.     predniSONE 20 MG tablet  Commonly known as:  DELTASONE  Take daily by mouth daily: 60mg 6/4-6/10, 50mg (2.5 tab) 6/11-6/17, 40mg (2 tab) 6/18-6/24, 30mg (1.5 tab) 6/25-7/1, 20 mg 7/2/18     sevelamer carbonate 800 mg Tab  Commonly known as:  RENVELA  Take 2 tablets (1,600 mg total) by mouth 3 (three) times daily with meals.     sirolimus 1 MG Tab  Commonly known as:  RAPAMUNE  Take 1 tablet (1 mg total) by mouth once daily.     torsemide 20 MG Tab  Commonly known as:  DEMADEX  Take 3 tablets (60 mg total) by mouth once daily.     ursodiol 300 mg capsule  Commonly known as:  ACTIGALL  Take 1 capsule (300 mg total) by mouth 2 (two) times daily.        STOP taking these medications    dronabinol 2.5 MG capsule  Commonly known as:  MARINOL     oxyCODONE 10 mg Tab immediate release tablet  Commonly known as:  ROXICODONE          Time spent caring for patient (Greater than 1/2 spent in direct face-to-face contact): > 30 minutes    Citlali Bridges PA-C  Liver Transplant  [facility]

## 2018-06-15 NOTE — PROGRESS NOTES
Patient arrived on unit via wheel chair. Standing weight obtained @ 56 kg. Dialysis tx initiated via right CVC. Ports aspirated and flushed without difficulty. Lines connected and secured> orders and machine settings verified. Ts started. God blood flows established. VSS. NAD.

## 2018-06-15 NOTE — PROGRESS NOTES
[facility] HEMODIALYSIS NOTE     Patient currently on hemodialysis for removal of uremic toxins and volume.     Patient seen and evaluated on hemodialysis, tolerating treatment, see HD flowsheet for vitals and assessments.      Ultrafiltration goal is 0 UF     Labs have been reviewed and the dialysate bath has been adjusted.     Assessment/Plan:  Seen on dialysis this morning, tolerating treatment well.  His SCr is the same as yesterday, BUN elevated with acidosis this morning.  He continues to make good urine and voices no complaints today.  Will continue bi-weekly hemodialysis with close monitoring of kidney function.        LANE Beard, [facility]  Nephrology  Pager:  [phone]

## 2018-06-16 ENCOUNTER — TELEPHONE (OUTPATIENT)
Dept: TRANSPLANT | Facility: CLINIC | Age: 28
End: 2018-06-16

## 2018-06-16 VITALS
SYSTOLIC BLOOD PRESSURE: 122 MMHG | HEART RATE: 94 BPM | TEMPERATURE: 99 F | RESPIRATION RATE: 16 BRPM | BODY MASS INDEX: 20.51 KG/M2 | WEIGHT: 127.63 LBS | DIASTOLIC BLOOD PRESSURE: 67 MMHG | HEIGHT: 66 IN | OXYGEN SATURATION: 99 %

## 2018-06-16 LAB
ALBUMIN SERPL BCP-MCNC: 2.7 G/DL
ALP SERPL-CCNC: 132 U/L
ALT SERPL W/O P-5'-P-CCNC: 22 U/L
ANION GAP SERPL CALC-SCNC: 10 MMOL/L
ANISOCYTOSIS BLD QL SMEAR: SLIGHT
AST SERPL-CCNC: 27 U/L
BASOPHILS # BLD AUTO: 0 K/UL
BASOPHILS NFR BLD: 0 %
BILIRUB SERPL-MCNC: 0.8 MG/DL
BUN SERPL-MCNC: 47 MG/DL
CALCIUM SERPL-MCNC: 8.5 MG/DL
CHLORIDE SERPL-SCNC: 104 MMOL/L
CO2 SERPL-SCNC: 29 MMOL/L
CREAT SERPL-MCNC: 2.4 MG/DL
DIFFERENTIAL METHOD: ABNORMAL
EOSINOPHIL # BLD AUTO: 0.1 K/UL
EOSINOPHIL NFR BLD: 3 %
ERYTHROCYTE [DISTWIDTH] IN BLOOD BY AUTOMATED COUNT: 15 %
EST. GFR  (AFRICAN AMERICAN): 40.9 ML/MIN/1.73 M^2
EST. GFR  (NON AFRICAN AMERICAN): 35.4 ML/MIN/1.73 M^2
GLUCOSE SERPL-MCNC: 83 MG/DL
HCT VFR BLD AUTO: 26.3 %
HGB BLD-MCNC: 8.1 G/DL
HYPOCHROMIA BLD QL SMEAR: ABNORMAL
IMM GRANULOCYTES # BLD AUTO: 0.03 K/UL
IMM GRANULOCYTES NFR BLD AUTO: 1.3 %
INR PPP: 0.9
LYMPHOCYTES # BLD AUTO: 0.8 K/UL
LYMPHOCYTES NFR BLD: 32.3 %
MAGNESIUM SERPL-MCNC: 1.7 MG/DL
MCH RBC QN AUTO: 31.5 PG
MCHC RBC AUTO-ENTMCNC: 30.8 G/DL
MCV RBC AUTO: 102 FL
MONOCYTES # BLD AUTO: 0.2 K/UL
MONOCYTES NFR BLD: 8.6 %
NEUTROPHILS # BLD AUTO: 1.3 K/UL
NEUTROPHILS NFR BLD: 54.8 %
NRBC BLD-RTO: 0 /100 WBC
PHOSPHATE SERPL-MCNC: 4.4 MG/DL
PLATELET # BLD AUTO: 26 K/UL
PLATELET BLD QL SMEAR: ABNORMAL
PMV BLD AUTO: 10.7 FL
POTASSIUM SERPL-SCNC: 3.8 MMOL/L
PROT SERPL-MCNC: 5.8 G/DL
PROTHROMBIN TIME: 10 SEC
RBC # BLD AUTO: 2.57 M/UL
SODIUM SERPL-SCNC: 143 MMOL/L
WBC # BLD AUTO: 2.32 K/UL

## 2018-06-16 PROCEDURE — 63600175 PHARM REV CODE 636 W HCPCS: Mod: NTX | Performed by: PSYCHIATRY & NEUROLOGY

## 2018-06-16 PROCEDURE — 83735 ASSAY OF MAGNESIUM: CPT | Mod: NTX

## 2018-06-16 PROCEDURE — 80053 COMPREHEN METABOLIC PANEL: CPT | Mod: NTX

## 2018-06-16 PROCEDURE — 84100 ASSAY OF PHOSPHORUS: CPT | Mod: NTX

## 2018-06-16 PROCEDURE — 25000003 PHARM REV CODE 250: Mod: NTX | Performed by: NURSE PRACTITIONER

## 2018-06-16 PROCEDURE — 85025 COMPLETE CBC W/AUTO DIFF WBC: CPT | Mod: NTX

## 2018-06-16 PROCEDURE — 85610 PROTHROMBIN TIME: CPT | Mod: NTX

## 2018-06-16 PROCEDURE — 36415 COLL VENOUS BLD VENIPUNCTURE: CPT | Mod: NTX

## 2018-06-16 PROCEDURE — 25000003 PHARM REV CODE 250: Mod: NTX | Performed by: STUDENT IN AN ORGANIZED HEALTH CARE EDUCATION/TRAINING PROGRAM

## 2018-06-16 PROCEDURE — 99239 HOSP IP/OBS DSCHRG MGMT >30: CPT | Mod: NTX,,, | Performed by: PHYSICIAN ASSISTANT

## 2018-06-16 PROCEDURE — 63600175 PHARM REV CODE 636 W HCPCS: Mod: NTX | Performed by: STUDENT IN AN ORGANIZED HEALTH CARE EDUCATION/TRAINING PROGRAM

## 2018-06-16 PROCEDURE — 63600175 PHARM REV CODE 636 W HCPCS: Mod: NTX | Performed by: NURSE PRACTITIONER

## 2018-06-16 RX ADMIN — FOLIC ACID 1 MG: 1 TABLET ORAL at 08:06

## 2018-06-16 RX ADMIN — POLYETHYLENE GLYCOL 3350 17 G: 17 POWDER, FOR SOLUTION ORAL at 08:06

## 2018-06-16 RX ADMIN — BUPRENORPHINE HYDROCHLORIDE, NALOXONE HYDROCHLORIDE 2 EACH: 2; .5 FILM, SOLUBLE BUCCAL; SUBLINGUAL at 08:06

## 2018-06-16 RX ADMIN — SODIUM BICARBONATE 650 MG TABLET 650 MG: at 08:06

## 2018-06-16 RX ADMIN — LEVOTHYROXINE SODIUM 75 MCG: 75 TABLET ORAL at 05:06

## 2018-06-16 RX ADMIN — TORSEMIDE 60 MG: 20 TABLET ORAL at 08:06

## 2018-06-16 RX ADMIN — VENLAFAXINE HYDROCHLORIDE 37.5 MG: 37.5 CAPSULE, EXTENDED RELEASE ORAL at 08:06

## 2018-06-16 RX ADMIN — SEVELAMER CARBONATE 1600 MG: 800 TABLET, FILM COATED ORAL at 08:06

## 2018-06-16 RX ADMIN — URSODIOL 300 MG: 300 CAPSULE ORAL at 08:06

## 2018-06-16 RX ADMIN — SIROLIMUS 1 MG: 1 TABLET, SUGAR COATED ORAL at 08:06

## 2018-06-16 RX ADMIN — PREDNISONE 50 MG: 10 TABLET ORAL at 08:06

## 2018-06-16 RX ADMIN — ACETAMINOPHEN 650 MG: 325 TABLET, FILM COATED ORAL at 05:06

## 2018-06-16 RX ADMIN — AMLODIPINE BESYLATE 5 MG: 5 TABLET ORAL at 08:06

## 2018-06-16 RX ADMIN — LEVETIRACETAM 500 MG: 500 TABLET ORAL at 08:06

## 2018-06-16 RX ADMIN — BISACODYL 10 MG: 5 TABLET, COATED ORAL at 08:06

## 2018-06-16 RX ADMIN — FLUCONAZOLE 200 MG: 200 TABLET ORAL at 08:06

## 2018-06-16 NOTE — PROGRESS NOTES
Discharge Medication Note:    Hospital Course:  Admitted for pain, oxycodone tapered off and Suboxone started per psychiatry.  Will discharge to [facility] as outpatient.     Met with Jhonny Diana at discharge to review discharge medications and to update the blue medication card.       Jhonny Diana   Home Medication Instructions NISHI:[id number]    Printed on:06/16/18 1290   Medication Information                      amLODIPine (NORVASC) 5 MG tablet  Take 1 tablet (5 mg total) by mouth once daily.             bisacodyl (DULCOLAX) 5 mg EC tablet  Take 2 tablets (10 mg total) by mouth once daily.             buprenorphine-naloxone (SUBOXONE) 8-2 mg Film  Place 1 packet (1 each total) under the tongue once daily.             docusate sodium (COLACE) 100 MG capsule  Take 1 capsule (100 mg total) by mouth 2 (two) times daily.             ergocalciferol (ERGOCALCIFEROL) 50,000 unit Cap  Take 1 capsule (50,000 Units total) by mouth every 7 days.             famotidine (PEPCID) 20 MG tablet  Take 1 tablet (20 mg total) by mouth every evening.             fluconazole (DIFLUCAN) 200 MG Tab  Take 1 tablet (200 mg total) by mouth once daily. STOP 6/23/18             folic acid (FOLVITE) 1 MG tablet  Take 1 tablet (1 mg total) by mouth once daily.             gabapentin (NEURONTIN) 100 MG capsule  Take 3 capsules (300 mg total) by mouth every evening.             levETIRAcetam (KEPPRA) 500 MG Tab  Take 1 tablet (500 mg total) by mouth 2 (two) times daily.             levothyroxine (SYNTHROID) 75 MCG tablet  Take 1 tablet (75 mcg total) by mouth before breakfast.             ondansetron (ZOFRAN-ODT) 8 MG TbDL  Take 1 tablet (8 mg total) by mouth every 8 (eight) hours as needed (nausea).             polyethylene glycol (GLYCOLAX) 17 gram PwPk  Mix 1 packet (17 g) in liquid and take by mouth 2 (two) times daily.             predniSONE (DELTASONE) 20 MG tablet  Take daily by mouth daily: 60mg 6/4-6/10, 50mg (2.5 tab) 6/11-6/17, 40mg (2  tab) 6/18-6/24, 30mg (1.5 tab) 6/25-7/1, 20 mg 7/2/18             sevelamer carbonate (RENVELA) 800 mg Tab  Take 2 tablets (1,600 mg total) by mouth 3 (three) times daily with meals.             sirolimus (RAPAMUNE) 1 MG Tab  Take 1 tablet (1 mg total) by mouth once daily.             sodium bicarbonate 650 MG tablet  Take 1 tablet (650 mg total) by mouth 2 (two) times daily.             torsemide (DEMADEX) 20 MG Tab  Take 3 tablets (60 mg total) by mouth once daily.             traZODone (DESYREL) 50 MG tablet  Take ½ tablet (25 mg total) by mouth every evening.             ursodiol (ACTIGALL) 300 mg capsule  Take 1 capsule (300 mg total) by mouth 2 (two) times daily.             venlafaxine (EFFEXOR-XR) 37.5 MG 24 hr capsule  Take 1 capsule (37.5 mg total) by mouth once daily.                 Pt was provided with prescriptions for the following meds:  E-rx: Suboxone (sent by psych), folic acid, Effexor  Printed rx: none  Phone-in or faxed rx: none to [facility] per pt request.    The following medications have been placed on HOLD and should be restarted in the outpatient setting (when appropriate): Cellnahun Diana verbalized understanding and had the opportunity to ask questions.

## 2018-06-16 NOTE — TELEPHONE ENCOUNTER
Received a call from Citlali JOLLEY.  Patient is being discharged and needs labs Thursday. Patient reports that he was told to go to the lab Monday since he has Dr. Rogers appointment Tuesday.

## 2018-06-16 NOTE — PLAN OF CARE
AAO x 4. VSS, afebrile, SpO2>95% on RA. Suboxone started today and tylenol as ordered-pt denies pain at this time and refused PM gabapentin. Fall precautions maintained and pt repositions self. See flowsheet for assessment findings. Will continue to monitor.

## 2018-06-18 ENCOUNTER — TELEPHONE (OUTPATIENT)
Dept: PSYCHIATRY | Facility: CLINIC | Age: 28
End: 2018-06-18

## 2018-06-18 ENCOUNTER — TELEPHONE (OUTPATIENT)
Dept: GASTROENTEROLOGY | Facility: CLINIC | Age: 28
End: 2018-06-18

## 2018-06-18 ENCOUNTER — TELEPHONE (OUTPATIENT)
Dept: TRANSPLANT | Facility: CLINIC | Age: 28
End: 2018-06-18

## 2018-06-18 ENCOUNTER — TELEPHONE (OUTPATIENT)
Dept: ENDOSCOPY | Facility: HOSPITAL | Age: 28
End: 2018-06-18

## 2018-06-18 ENCOUNTER — LAB VISIT (OUTPATIENT)
Dept: LAB | Facility: HOSPITAL | Age: 28
End: 2018-06-18
Attending: INTERNAL MEDICINE
Payer: COMMERCIAL

## 2018-06-18 DIAGNOSIS — K74.60 HEPATIC CIRRHOSIS, UNSPECIFIED HEPATIC CIRRHOSIS TYPE, UNSPECIFIED WHETHER ASCITES PRESENT: Primary | ICD-10-CM

## 2018-06-18 DIAGNOSIS — Z99.2 ESRD ON DIALYSIS: ICD-10-CM

## 2018-06-18 DIAGNOSIS — D69.6 PLATELETS DECREASED: ICD-10-CM

## 2018-06-18 DIAGNOSIS — Z94.4 LIVER REPLACED BY TRANSPLANT: ICD-10-CM

## 2018-06-18 DIAGNOSIS — N18.6 ESRD ON DIALYSIS: ICD-10-CM

## 2018-06-18 DIAGNOSIS — Z94.4 STATUS POST LIVER TRANSPLANT: ICD-10-CM

## 2018-06-18 DIAGNOSIS — Z94.4 LIVER TRANSPLANTED: Primary | ICD-10-CM

## 2018-06-18 LAB
ALBUMIN SERPL BCP-MCNC: 2.8 G/DL
ALP SERPL-CCNC: 158 U/L
ALT SERPL W/O P-5'-P-CCNC: 33 U/L
ANION GAP SERPL CALC-SCNC: 12 MMOL/L
ANISOCYTOSIS BLD QL SMEAR: SLIGHT
AST SERPL-CCNC: 29 U/L
BASOPHILS # BLD AUTO: 0.01 K/UL
BASOPHILS NFR BLD: 0.4 %
BILIRUB DIRECT SERPL-MCNC: 0.5 MG/DL
BILIRUB SERPL-MCNC: 0.7 MG/DL
BUN SERPL-MCNC: 76 MG/DL
CALCIUM SERPL-MCNC: 8.7 MG/DL
CHLORIDE SERPL-SCNC: 105 MMOL/L
CO2 SERPL-SCNC: 26 MMOL/L
CREAT SERPL-MCNC: 3.1 MG/DL
DIFFERENTIAL METHOD: ABNORMAL
EOSINOPHIL # BLD AUTO: 0.1 K/UL
EOSINOPHIL NFR BLD: 2 %
ERYTHROCYTE [DISTWIDTH] IN BLOOD BY AUTOMATED COUNT: 15 %
EST. GFR  (AFRICAN AMERICAN): 30 ML/MIN/1.73 M^2
EST. GFR  (NON AFRICAN AMERICAN): 26 ML/MIN/1.73 M^2
GLUCOSE SERPL-MCNC: 111 MG/DL
HCT VFR BLD AUTO: 25.9 %
HGB BLD-MCNC: 8.3 G/DL
IMM GRANULOCYTES # BLD AUTO: 0.02 K/UL
IMM GRANULOCYTES NFR BLD AUTO: 0.8 %
LYMPHOCYTES # BLD AUTO: 0.9 K/UL
LYMPHOCYTES NFR BLD: 38.1 %
MCH RBC QN AUTO: 32 PG
MCHC RBC AUTO-ENTMCNC: 32 G/DL
MCV RBC AUTO: 100 FL
MONOCYTES # BLD AUTO: 0.2 K/UL
MONOCYTES NFR BLD: 7.4 %
NEUTROPHILS # BLD AUTO: 1.3 K/UL
NEUTROPHILS NFR BLD: 51.3 %
NRBC BLD-RTO: 0 /100 WBC
PLATELET # BLD AUTO: 20 K/UL
PLATELET BLD QL SMEAR: ABNORMAL
PMV BLD AUTO: 12.5 FL
POTASSIUM SERPL-SCNC: 4 MMOL/L
PROT SERPL-MCNC: 6.3 G/DL
RBC # BLD AUTO: 2.59 M/UL
SIROLIMUS BLD-MCNC: 6.8 NG/ML
SODIUM SERPL-SCNC: 143 MMOL/L
WBC # BLD AUTO: 2.44 K/UL

## 2018-06-18 PROCEDURE — 36415 COLL VENOUS BLD VENIPUNCTURE: CPT | Mod: NTX

## 2018-06-18 PROCEDURE — 80195 ASSAY OF SIROLIMUS: CPT | Mod: NTX

## 2018-06-18 PROCEDURE — 82248 BILIRUBIN DIRECT: CPT | Mod: TXP

## 2018-06-18 PROCEDURE — 80053 COMPREHEN METABOLIC PANEL: CPT | Mod: NTX

## 2018-06-18 PROCEDURE — 85025 COMPLETE CBC W/AUTO DIFF WBC: CPT | Mod: TXP

## 2018-06-18 NOTE — TELEPHONE ENCOUNTER
Spoke to pt regarding ERCP scheduled for 7/2/18, he is a dialysis pt and cirrhosis, stat serum potassium, CBC and PT/INR at 8:00 am 2nd floor clinic them arrival for procedure 8:30 am. Prep instructions sent to pt through [facility].

## 2018-06-18 NOTE — TELEPHONE ENCOUNTER
Spoke with patient. ERCP scheduled for 7/2 at 9:30a. Reviewed prep instructions. Mr Diana verbalized understanding.

## 2018-06-18 NOTE — TELEPHONE ENCOUNTER
Advised patient he could go ahead and take 1/2 film in the morning and 1/2 film in the afternoon/evening. Should stil have enough to last until he starts [facility] next week.    ----- Message from Lee Ann Lyles sent at 6/18/2018  9:38 AM CDT -----  Contact: pt   Pt calling stating that you advised him that if meds buprenorphine-naloxone (SUBOXONE) 8-2 mg Film dosage was not working for him to give you a call.  He stated it's not lasting a day . Pt is requesting for a higher dosage     [facility]     [phone]

## 2018-06-19 ENCOUNTER — OFFICE VISIT (OUTPATIENT)
Dept: TRANSPLANT | Facility: CLINIC | Age: 28
End: 2018-06-19
Payer: COMMERCIAL

## 2018-06-19 VITALS
WEIGHT: 126.13 LBS | BODY MASS INDEX: 20.27 KG/M2 | HEIGHT: 66 IN | RESPIRATION RATE: 16 BRPM | OXYGEN SATURATION: 98 % | DIASTOLIC BLOOD PRESSURE: 64 MMHG | SYSTOLIC BLOOD PRESSURE: 120 MMHG | HEART RATE: 78 BPM | TEMPERATURE: 98 F

## 2018-06-19 DIAGNOSIS — R10.9 ABDOMINAL PAIN, UNSPECIFIED ABDOMINAL LOCATION: ICD-10-CM

## 2018-06-19 DIAGNOSIS — Z29.89 PROPHYLACTIC IMMUNOTHERAPY: Chronic | ICD-10-CM

## 2018-06-19 DIAGNOSIS — Z48.23 ENCOUNTER FOR AFTERCARE FOLLOWING LIVER TRANSPLANT: Primary | ICD-10-CM

## 2018-06-19 DIAGNOSIS — T86.49 BILIARY STRICTURE OF TRANSPLANTED LIVER: ICD-10-CM

## 2018-06-19 DIAGNOSIS — Z94.4 LIVER TRANSPLANTED: Primary | ICD-10-CM

## 2018-06-19 DIAGNOSIS — E43 SEVERE PROTEIN-CALORIE MALNUTRITION: ICD-10-CM

## 2018-06-19 DIAGNOSIS — K83.1 BILIARY STRICTURE OF TRANSPLANTED LIVER: ICD-10-CM

## 2018-06-19 DIAGNOSIS — D69.6 THROMBOCYTOPENIA: ICD-10-CM

## 2018-06-19 DIAGNOSIS — Z79.60 LONG-TERM USE OF IMMUNOSUPPRESSANT MEDICATION: Chronic | ICD-10-CM

## 2018-06-19 PROCEDURE — 99215 OFFICE O/P EST HI 40 MIN: CPT | Mod: NTX,S$GLB,, | Performed by: INTERNAL MEDICINE

## 2018-06-19 PROCEDURE — 3008F BODY MASS INDEX DOCD: CPT | Mod: CPTII,NTX,S$GLB, | Performed by: INTERNAL MEDICINE

## 2018-06-19 PROCEDURE — 99999 PR PBB SHADOW E&M-EST. PATIENT-LVL III: CPT | Mod: PBBFAC,TXP,, | Performed by: INTERNAL MEDICINE

## 2018-06-19 RX ORDER — DRONABINOL 2.5 MG/1
2.5 CAPSULE ORAL DAILY
Qty: 30 CAPSULE | Refills: 0 | Status: SHIPPED | OUTPATIENT
Start: 2018-06-19 | End: 2018-07-23

## 2018-06-19 RX ORDER — DRONABINOL 2.5 MG/1
2.5 CAPSULE ORAL DAILY
COMMUNITY
End: 2018-06-19 | Stop reason: SDUPTHER

## 2018-06-19 NOTE — TELEPHONE ENCOUNTER
----- Message from Yara Law sent at 6/19/2018  2:19 PM CDT -----  Contact: Pt  Lubna Agustin, Pt requesting refill for Dronabinol 2.5 mg.  Please call [phone].

## 2018-06-19 NOTE — PROGRESS NOTES
Transplant Hepatology  Liver Transplant Recipient Follow-up    Transplant Date: 10/19/2017  UNOS Native Liver Dx: Acute Alcoholic Hepatitis    Mr. Diana is here for follow up of his liver transplant.  He is accompanied by his mother.      ORGAN: LIVER  Whole or Partial: whole liver  Donor Type:  - brain death  CDC High Risk: no  Donor CMV Status: Positive  Donor HCV Status: Negative  Donor HBcAb: Negative  Biliary Anastomosis: end to end  Arterial Anatomy: standard  IVC reconstruction: end to end ivc  Portal vein status: patent    The patient was admitted for acute alcoholic hepatitis complicated by LAURA and underwent OLT on 10/19/2017.  The patient has had a complicated post-operative course.  The patient required HD and had a brief period of increased UOP from -2017 without need for HD.  Urine output decreased and currently is maintained on HD every MWF.  Patient has also had issues with recurrent infection and required 3 post-operative admissions with fever and leukocytosis.  The patient has undergone multiple paracenteses as well as abscess drainage of perihepatic fluid collections.  Treated with antimicrobial therapy but no positive cultures.  The patient continues to struggle with volume overload.  Biliary stricture also identified and ERCP performed on 2017 with sphincterotomy and biliary stent placed.  Liver tests have normalized.    The patient has a woundvac for wound infection which required drainage and antimicrobial therapy.  Placed on TPN for a short course due to malnutrition and ongoing hypoalbuminemia.  The patient had seizure activity while on prograf and has been switched to cyclosporine without recurrent episodes.  CsA was then switched to sirolimus. He continued to have chronic abd pain, oxycodone was switched to suboxome, managed by Addiction Psychiatry.      Subjective:     Interval History:   Doing well since discharge from the hospital.      IS is currently sirolimus 1 mg  daily.    The patient continues on HD on twice daily.  He notes increased urine output.       Review of Systems   Constitutional: Positive for fatigue. Negative for activity change, appetite change, chills and unexpected weight change.   HENT: Negative for hearing loss and sore throat.    Eyes: Negative for visual disturbance.   Respiratory: Negative for shortness of breath.    Cardiovascular: Negative for chest pain and leg swelling.   Gastrointestinal: Negative for abdominal distention, abdominal pain, nausea and vomiting.   Musculoskeletal: Negative for gait problem.   Skin: Negative for rash.   Neurological: Negative for weakness and headaches.   Hematological: Negative for adenopathy. Does not bruise/bleed easily.   Psychiatric/Behavioral: Negative for confusion.       Objective:     Physical Exam   Constitutional: He is oriented to person, place, and time. He appears well-developed. No distress.   Patient sitting in wheelchair   HENT:   Head: Normocephalic and atraumatic.   Mouth/Throat: Oropharynx is clear and moist.   Eyes: Conjunctivae are normal. Pupils are equal, round, and reactive to light. No scleral icterus.   Neck: Normal range of motion. Neck supple. No thyromegaly present.   Cardiovascular: Normal rate, regular rhythm and normal heart sounds.  Exam reveals no gallop and no friction rub.    No murmur heard.  Pulmonary/Chest: Effort normal and breath sounds normal. No respiratory distress. He has no wheezes. He has no rales.   Abdominal: Soft. Bowel sounds are normal. He exhibits no distension. There is no tenderness.   Well healed Chevron incision   Musculoskeletal: Normal range of motion. He exhibits no edema (1+ BLE edema).   Lymphadenopathy:     He has no cervical adenopathy.   Neurological: He is alert and oriented to person, place, and time. No cranial nerve deficit.   Skin: Skin is warm and dry. No erythema.   Psychiatric: He has a normal mood and affect. His behavior is normal.   Vitals  reviewed.    Lab Results   Component Value Date    BILITOT 0.7 06/18/2018    AST 29 06/18/2018    ALT 33 06/18/2018    ALKPHOS 158 (H) 06/18/2018    CREATININE 3.1 (H) 06/18/2018    ALBUMIN 2.8 (L) 06/18/2018     Lab Results   Component Value Date    WBC 2.44 (L) 06/18/2018    HGB 8.3 (L) 06/18/2018    HCT 25.9 (L) 06/18/2018    HCT 28 (L) 10/20/2017    PLT 20 (LL) 06/18/2018     Lab Results   Component Value Date    TACROLIMUS <1.5 (L) 03/04/2018    CYCLOSPORINE <10 (L) 06/02/2018    SIROLIMUSLEV 6.8 06/18/2018       Assessment/Plan:   26yo male s/p OLT with complicated course including abnormal liver tests, volume overload, renal insufficiency - on dialysis, biliary anastomotic strictures and concern for intra-abdominal infections.    Since hospital discharge last week, he has been doing well. Abd pain is controlled on suboxome, managed by Addiction Psychiatry.    Recommendation(s):  - continue suboxome as prescribed/recommended by Dr. Bucio  - continue sirolimus (rapamune) and other medications, no change  - ERCP on 7/2/18, will need platelet transfusion  - continue enhanced nutrition with protein supplements    A total of 40 minutes were spent face-to-face with the patient during this encounter and over half of that time was spent on counseling and coordination of care.  We discussed in depth the nature of the patient's immunocompromised status, liver graft function, immunosuppression, preventative measures and the management plan in details. I also educated the patient about lifestyle modifications which may improve hepatic steatosis, overweight/obesity, insulin resistance and high blood pressure issues. I have provided the patient with an opportunity to ask questions and have all questions answered to his satisfaction.     Georgina Rogers MD           [facility] Patient Status  Functional Status: 60% - Requires occasional assistance but is able to care for needs  Physical Capacity: Limited Mobility

## 2018-06-19 NOTE — LETTER
June 19, 2018                     [facility] Trans[facility]  [address]  [facility] [address]  Phone: [phone]   Patient: Jhonny Diana   MR Number: [id number]   YOB: 1990   Date of Visit: 6/19/2018       Dear      Thank you for referring Jhonny Diana to me for evaluation. Attached you will find relevant portions of my assessment and plan of care.    If you have questions, please do not hesitate to call me. I look forward to following Jhonny Diana along with you.    Sincerely,    Georgina Rogers MD    Enclosure    If you would like to receive this communication electronically, please contact [email or web address] or [phone] to request [employer] access.    [employer] is a tool which provides read-only access to select patient information with whom you have a relationship. Its easy to use and provides real time access to review your patients record including encounter summaries, notes, results, and demographic information.    If you feel you have received this communication in error or would no longer like to receive these types of communications, please e-mail [email or web address]

## 2018-06-19 NOTE — PATIENT INSTRUCTIONS
- continue suboxome as prescribed/recommended by Dr. Bucio  - continue sirolimus (rapamune) and other medications, no change

## 2018-06-20 ENCOUNTER — TELEPHONE (OUTPATIENT)
Dept: HEMATOLOGY/ONCOLOGY | Facility: CLINIC | Age: 28
End: 2018-06-20

## 2018-06-22 ENCOUNTER — TELEPHONE (OUTPATIENT)
Dept: TRANSPLANT | Facility: CLINIC | Age: 28
End: 2018-06-22

## 2018-06-22 NOTE — TELEPHONE ENCOUNTER
Reviewed and stable. Msg sent to pt via [facility], and pt has no phone number listed. Called both numbers for mother, they both just rang.

## 2018-06-22 NOTE — TELEPHONE ENCOUNTER
----- Message from Georgina Rogers MD sent at 6/18/2018  7:26 PM CDT -----  Patient is not admitted. Labs stable, platelets 20K, no need for platelet tranfusion unless he has bleeding. Sirolimus level and liver tests ok.

## 2018-06-22 NOTE — TELEPHONE ENCOUNTER
----- Message from Georgina Rogers MD sent at 6/19/2018  3:52 PM CDT -----  He looks well, abd pain is much better on suboxome. I signed refills for merinol. No change in meds. Labs next week. Thank you.     For his ERCP on 7/2, he will probably need platelet transfusion. We can ask Dr. Amrik Hennnig and see how many units he wants to give.    NL  ----- Message -----  From: Lubna Agustin RN  Sent: 6/19/2018  12:19 PM  To: Georgina Rogers MD    You will see him in clinic this afternoon.  ----- Message -----  From: Georgina Rogers MD  Sent: 6/18/2018   7:26 PM  To: [facility] Post-Liver Transplant Clinical    Patient is not admitted. Labs stable, platelets 20K, no need for platelet tranfusion unless he has bleeding. Sirolimus level and liver tests ok.

## 2018-06-25 ENCOUNTER — LAB VISIT (OUTPATIENT)
Dept: LAB | Facility: HOSPITAL | Age: 28
End: 2018-06-25
Attending: INTERNAL MEDICINE
Payer: COMMERCIAL

## 2018-06-25 ENCOUNTER — NURSE TRIAGE (OUTPATIENT)
Dept: ADMINISTRATIVE | Facility: CLINIC | Age: 28
End: 2018-06-25

## 2018-06-25 DIAGNOSIS — Z94.4 STATUS POST LIVER TRANSPLANT: ICD-10-CM

## 2018-06-25 DIAGNOSIS — Z94.4 LIVER REPLACED BY TRANSPLANT: ICD-10-CM

## 2018-06-25 LAB
ALBUMIN SERPL BCP-MCNC: 3.2 G/DL
ALP SERPL-CCNC: 169 U/L
ALT SERPL W/O P-5'-P-CCNC: 33 U/L
ANION GAP SERPL CALC-SCNC: 13 MMOL/L
ANISOCYTOSIS BLD QL SMEAR: SLIGHT
AST SERPL-CCNC: 27 U/L
BASOPHILS # BLD AUTO: 0.01 K/UL
BASOPHILS NFR BLD: 0.3 %
BILIRUB DIRECT SERPL-MCNC: 0.5 MG/DL
BILIRUB SERPL-MCNC: 0.7 MG/DL
BUN SERPL-MCNC: 79 MG/DL
CALCIUM SERPL-MCNC: 9.6 MG/DL
CHLORIDE SERPL-SCNC: 105 MMOL/L
CO2 SERPL-SCNC: 27 MMOL/L
CREAT SERPL-MCNC: 3.6 MG/DL
DIFFERENTIAL METHOD: ABNORMAL
EOSINOPHIL # BLD AUTO: 0 K/UL
EOSINOPHIL NFR BLD: 0.5 %
ERYTHROCYTE [DISTWIDTH] IN BLOOD BY AUTOMATED COUNT: 15 %
EST. GFR  (AFRICAN AMERICAN): 25.1 ML/MIN/1.73 M^2
EST. GFR  (NON AFRICAN AMERICAN): 21.7 ML/MIN/1.73 M^2
GLUCOSE SERPL-MCNC: 95 MG/DL
HCT VFR BLD AUTO: 32.1 %
HGB BLD-MCNC: 9.9 G/DL
HYPOCHROMIA BLD QL SMEAR: ABNORMAL
IMM GRANULOCYTES # BLD AUTO: 0.03 K/UL
IMM GRANULOCYTES NFR BLD AUTO: 0.8 %
INR PPP: 0.9
LYMPHOCYTES # BLD AUTO: 1 K/UL
LYMPHOCYTES NFR BLD: 24.6 %
MCH RBC QN AUTO: 31.4 PG
MCHC RBC AUTO-ENTMCNC: 30.8 G/DL
MCV RBC AUTO: 102 FL
MONOCYTES # BLD AUTO: 0.4 K/UL
MONOCYTES NFR BLD: 9.8 %
NEUTROPHILS # BLD AUTO: 2.6 K/UL
NEUTROPHILS NFR BLD: 64 %
NRBC BLD-RTO: 0 /100 WBC
OVALOCYTES BLD QL SMEAR: ABNORMAL
PLATELET # BLD AUTO: 27 K/UL
PMV BLD AUTO: 10.5 FL
POIKILOCYTOSIS BLD QL SMEAR: SLIGHT
POLYCHROMASIA BLD QL SMEAR: ABNORMAL
POTASSIUM SERPL-SCNC: 4.6 MMOL/L
PROT SERPL-MCNC: 7.2 G/DL
PROTHROMBIN TIME: 9.7 SEC
RBC # BLD AUTO: 3.15 M/UL
SIROLIMUS BLD-MCNC: 7.5 NG/ML
SODIUM SERPL-SCNC: 145 MMOL/L
WBC # BLD AUTO: 3.98 K/UL

## 2018-06-25 PROCEDURE — 36415 COLL VENOUS BLD VENIPUNCTURE: CPT | Mod: NTX

## 2018-06-25 PROCEDURE — 85025 COMPLETE CBC W/AUTO DIFF WBC: CPT | Mod: NTX

## 2018-06-25 PROCEDURE — 82248 BILIRUBIN DIRECT: CPT | Mod: NTX

## 2018-06-25 PROCEDURE — 85610 PROTHROMBIN TIME: CPT | Mod: NTX

## 2018-06-25 PROCEDURE — 80195 ASSAY OF SIROLIMUS: CPT | Mod: TXP

## 2018-06-25 PROCEDURE — 80053 COMPREHEN METABOLIC PANEL: CPT | Mod: TXP

## 2018-06-26 ENCOUNTER — TELEPHONE (OUTPATIENT)
Dept: TRANSPLANT | Facility: CLINIC | Age: 28
End: 2018-06-26

## 2018-06-26 ENCOUNTER — TELEPHONE (OUTPATIENT)
Dept: PSYCHIATRY | Facility: CLINIC | Age: 28
End: 2018-06-26

## 2018-06-26 NOTE — TELEPHONE ENCOUNTER
Received a call from pt's mother. She states that she is concerned that her son is having some forgetfullness and confusion. Also some shakiness. Pt was started on effexor and suboxone. Recommended that she take pt to ER or call the doctor dakota prescribed those medications. To note, he is not having fever, and labs stable. She states she will call the psych MD first and then make the decisiion if she needs to bring him to ER.

## 2018-06-26 NOTE — TELEPHONE ENCOUNTER
Liver transplant 10/17    Mother calling for pt. Reported sx's she has been noticing for several days- hand twitching ( was related to tardive dyskinesia when hospitalized recently), some cognitive slowing, he is sleeping more and having episodes of diaphoresis. When hospitalized recently sx's had improved when his reglan and cyclosporin were dc'ed but now returning. Today he called her from dialysis in tears because he couldn't find her phone number in his phone. Reported the abdominal pain that had been part of his c/o's from recent hospitalizations has resolved completely since he was started on suboxone.   Mom wanted to bring sx's to the dr's attention.     Reason for Disposition   Nursing judgment    Protocols used: ST NO GUIDELINE OR REFERENCE [id number]

## 2018-06-26 NOTE — TELEPHONE ENCOUNTER
Called mother back, she reports that he has seemed more withdrawn the past few days, sleeping more and yesterday had a panic attack associated with confusion. Recommended that they hold marinol which was restarted one week ago since it is not well tolerated by people with psychiatric disease and addictions and could be causing panic or confusion or mood symptoms. Also suggested that they reduce the buprenorphine to 4 mg in the morning and 2 mg in the evening, which we increased from 4 mg daily to 4 mg bid last week due to patient's complaints of pain. Advised her to call back if not improving. Patient is to start ABU on Friday 6/29.          ----- Message from Hamilton Nickerson MA sent at 6/26/2018  8:35 AM CDT -----  Contact: Sanjuanita Hartley [phone]  Sanjuanita is requesting a call back regarding some confusion her son the above pt is having  Due to the medication he's taking, please advice    Thanks

## 2018-06-27 ENCOUNTER — INITIAL CONSULT (OUTPATIENT)
Dept: HEMATOLOGY/ONCOLOGY | Facility: CLINIC | Age: 28
End: 2018-06-27
Payer: COMMERCIAL

## 2018-06-27 ENCOUNTER — TELEPHONE (OUTPATIENT)
Dept: HEMATOLOGY/ONCOLOGY | Facility: CLINIC | Age: 28
End: 2018-06-27

## 2018-06-27 ENCOUNTER — LAB VISIT (OUTPATIENT)
Dept: LAB | Facility: HOSPITAL | Age: 28
End: 2018-06-27
Attending: INTERNAL MEDICINE
Payer: COMMERCIAL

## 2018-06-27 ENCOUNTER — TELEPHONE (OUTPATIENT)
Dept: TRANSPLANT | Facility: CLINIC | Age: 28
End: 2018-06-27

## 2018-06-27 VITALS
DIASTOLIC BLOOD PRESSURE: 68 MMHG | HEIGHT: 66 IN | BODY MASS INDEX: 21.93 KG/M2 | OXYGEN SATURATION: 100 % | WEIGHT: 136.44 LBS | RESPIRATION RATE: 18 BRPM | TEMPERATURE: 98 F | SYSTOLIC BLOOD PRESSURE: 141 MMHG | HEART RATE: 102 BPM

## 2018-06-27 DIAGNOSIS — N18.5 ANEMIA OF CHRONIC RENAL FAILURE, STAGE 5: ICD-10-CM

## 2018-06-27 DIAGNOSIS — D69.6 THROMBOCYTOPENIA: ICD-10-CM

## 2018-06-27 DIAGNOSIS — E83.9 CHRONIC KIDNEY DISEASE-MINERAL AND BONE DISORDER: ICD-10-CM

## 2018-06-27 DIAGNOSIS — M89.9 CHRONIC KIDNEY DISEASE-MINERAL AND BONE DISORDER: ICD-10-CM

## 2018-06-27 DIAGNOSIS — Z94.4 S/P LIVER TRANSPLANT: Chronic | ICD-10-CM

## 2018-06-27 DIAGNOSIS — N18.9 CHRONIC KIDNEY DISEASE-MINERAL AND BONE DISORDER: ICD-10-CM

## 2018-06-27 DIAGNOSIS — D63.1 ANEMIA OF CHRONIC RENAL FAILURE, STAGE 5: ICD-10-CM

## 2018-06-27 DIAGNOSIS — D69.6 THROMBOCYTOPENIA: Primary | ICD-10-CM

## 2018-06-27 LAB
ANISOCYTOSIS BLD QL SMEAR: SLIGHT
BASOPHILS # BLD AUTO: 0.01 K/UL
BASOPHILS NFR BLD: 0.2 %
DIFFERENTIAL METHOD: ABNORMAL
EOSINOPHIL # BLD AUTO: 0 K/UL
EOSINOPHIL NFR BLD: 0.5 %
ERYTHROCYTE [DISTWIDTH] IN BLOOD BY AUTOMATED COUNT: 15.1 %
HCT VFR BLD AUTO: 30.4 %
HGB BLD-MCNC: 9.8 G/DL
HYPOCHROMIA BLD QL SMEAR: ABNORMAL
IMM GRANULOCYTES # BLD AUTO: 0.11 K/UL
IMM GRANULOCYTES NFR BLD AUTO: 2.7 %
LYMPHOCYTES # BLD AUTO: 0.6 K/UL
LYMPHOCYTES NFR BLD: 13.7 %
MCH RBC QN AUTO: 32.3 PG
MCHC RBC AUTO-ENTMCNC: 32.2 G/DL
MCV RBC AUTO: 100 FL
MONOCYTES # BLD AUTO: 0.4 K/UL
MONOCYTES NFR BLD: 10.2 %
NEUTROPHILS # BLD AUTO: 2.9 K/UL
NEUTROPHILS NFR BLD: 72.7 %
NRBC BLD-RTO: 0 /100 WBC
PLATELET # BLD AUTO: 22 K/UL
PLATELET BLD QL SMEAR: ABNORMAL
PMV BLD AUTO: 12.2 FL
POLYCHROMASIA BLD QL SMEAR: ABNORMAL
RBC # BLD AUTO: 3.03 M/UL
WBC # BLD AUTO: 4.02 K/UL

## 2018-06-27 PROCEDURE — 36415 COLL VENOUS BLD VENIPUNCTURE: CPT | Mod: NTX

## 2018-06-27 PROCEDURE — 99999 PR PBB SHADOW E&M-EST. PATIENT-LVL III: CPT | Mod: PBBFAC,,, | Performed by: INTERNAL MEDICINE

## 2018-06-27 PROCEDURE — 85025 COMPLETE CBC W/AUTO DIFF WBC: CPT | Mod: NTX

## 2018-06-27 PROCEDURE — 99215 OFFICE O/P EST HI 40 MIN: CPT | Mod: S$GLB,,, | Performed by: INTERNAL MEDICINE

## 2018-06-27 PROCEDURE — 3008F BODY MASS INDEX DOCD: CPT | Mod: CPTII,S$GLB,, | Performed by: INTERNAL MEDICINE

## 2018-06-27 NOTE — TELEPHONE ENCOUNTER
Psych Dr. Green is managing pt's medication in regards to pain and mood. She is monitoring pt closely.

## 2018-06-27 NOTE — TELEPHONE ENCOUNTER
----- Message from Georgina Rogers MD sent at 6/26/2018  1:51 PM CDT -----  I can see him in 2 weeks. I am on LTS next week so I do not have an open slot.  If he is acutely confused, ER is appropriate, of course.    ----- Message -----  From: Lubna Agustin RN  Sent: 6/26/2018  10:43 AM  To: Georgina Rogers MD    Hi Dr Rogers,    I have one quick question and I wanted to let you know about a call I received from pt's mom.    1. When would you like to see him back in clinic?  2. His mother called this am stating he was having some confusion. His Psych MD started him on Effexor and he is on the suboxone. I recommended that she could call the prescribing doctor and/or bring pt to ER for evaluation.    Lubna  ----- Message -----  From: Georgina Rogers MD  Sent: 6/19/2018   5:36 PM  To: Lubna Agustin, RN    YAO

## 2018-06-27 NOTE — LETTER
July 1, 2018      Nathanael Medina MD  [address]  [facility] [address]           [facility]  [address]  [facility] [address]  Phone: [phone]          Patient: Jhonny Diana   MR Number: [id number]   YOB: 1990   Date of Visit: 6/27/2018       Dear Dr. Nathanael Medina:    Thank you for referring Jhonny Diana to me for evaluation. Attached you will find relevant portions of my assessment and plan of care.    If you have questions, please do not hesitate to call me. I look forward to following Jhonny Diana along with you.    Sincerely,    Syed Maurice MD    Enclosure  CC:  No Recipients    If you would like to receive this communication electronically, please contact [email or web address] or [phone] to request more information on [employer] Link access.    For providers and/or their staff who would like to refer a patient to [facility], please contact us through our one-stop-shop provider referral line, Svetlana Gomez, at [phone].    If you feel you have received this communication in error or would no longer like to receive these types of communications, please e-mail [email or web address]

## 2018-06-28 ENCOUNTER — TELEPHONE (OUTPATIENT)
Dept: TRANSPLANT | Facility: CLINIC | Age: 28
End: 2018-06-28

## 2018-06-28 DIAGNOSIS — D69.1 ABNORMAL PLATELETS: ICD-10-CM

## 2018-06-28 DIAGNOSIS — Z94.4 LIVER TRANSPLANTED: Primary | ICD-10-CM

## 2018-06-28 DIAGNOSIS — Z94.4 LIVER REPLACED BY TRANSPLANT: Primary | ICD-10-CM

## 2018-06-28 DIAGNOSIS — D69.6 PLATELET COUNT LESS 100,000 PER CUBIC MILLIMETER: ICD-10-CM

## 2018-06-28 RX ORDER — HYDROCODONE BITARTRATE AND ACETAMINOPHEN 500; 5 MG/1; MG/1
TABLET ORAL ONCE
Status: CANCELLED | OUTPATIENT
Start: 2018-06-28 | End: 2018-06-28

## 2018-06-28 NOTE — TELEPHONE ENCOUNTER
----- Message from Georgina Rogers MD sent at 6/28/2018 11:13 AM CDT -----  Regarding: RE: Question  2 units of platelets!    1 unit usually raises 20-40K. His plt 23K and GI wants > 50K as goal.      ----- Message -----  From: Lubna Agustin RN  Sent: 6/28/2018   9:56 AM  To: Georgina Rogers MD  Subject: Question                                         Dr. Rogers,     Can you review pt's labs and let me know how many platelets that we need to order for prior to his ERCP on Monday? The Radiologist states he will need based off labs.    Thanks,  Lubna

## 2018-06-28 NOTE — TELEPHONE ENCOUNTER
Called pt's mother to review that pt will need a platelet infusion with his ERCP procedure on Monday. Pt will come to clinic on 6/29 to sign consent. On 6/30, pt will come to txp lab for type and screen. He will then have labs drawn prior to procedure on Monday. She states that she understands.

## 2018-06-29 ENCOUNTER — TELEPHONE (OUTPATIENT)
Dept: GASTROENTEROLOGY | Facility: CLINIC | Age: 28
End: 2018-06-29

## 2018-06-29 ENCOUNTER — HOSPITAL ENCOUNTER (OUTPATIENT)
Dept: PSYCHIATRY | Facility: HOSPITAL | Age: 28
Discharge: HOME | End: 2018-06-29
Attending: PSYCHIATRY & NEUROLOGY
Payer: COMMERCIAL

## 2018-06-29 ENCOUNTER — TELEPHONE (OUTPATIENT)
Dept: TRANSPLANT | Facility: CLINIC | Age: 28
End: 2018-06-29

## 2018-06-29 VITALS
HEART RATE: 94 BPM | DIASTOLIC BLOOD PRESSURE: 71 MMHG | BODY MASS INDEX: 22.5 KG/M2 | RESPIRATION RATE: 16 BRPM | TEMPERATURE: 98 F | HEIGHT: 66 IN | WEIGHT: 140 LBS | SYSTOLIC BLOOD PRESSURE: 119 MMHG

## 2018-06-29 DIAGNOSIS — F11.20 UNCOMPLICATED OPIOID DEPENDENCE: Primary | ICD-10-CM

## 2018-06-29 DIAGNOSIS — F10.21 ALCOHOL USE DISORDER, SEVERE, IN EARLY REMISSION: Primary | ICD-10-CM

## 2018-06-29 DIAGNOSIS — R45.89 ANXIETY ABOUT HEALTH: ICD-10-CM

## 2018-06-29 LAB
AMPHET+METHAMPHET UR QL: NEGATIVE
BARBITURATES UR QL SCN>200 NG/ML: NEGATIVE
BENZODIAZ UR QL SCN>200 NG/ML: NEGATIVE
BREATH ALCOHOL: 0
BZE UR QL SCN: NEGATIVE
CANNABINOIDS UR QL SCN: NEGATIVE
CREAT UR-MCNC: 25 MG/DL
ETHANOL UR-MCNC: <10 MG/DL
METHADONE UR QL SCN>300 NG/ML: NEGATIVE
OPIATES UR QL SCN: NEGATIVE
PCP UR QL SCN>25 NG/ML: NEGATIVE
TOXICOLOGY INFORMATION: NORMAL

## 2018-06-29 PROCEDURE — 90853 GROUP PSYCHOTHERAPY: CPT | Mod: NTX | Performed by: SOCIAL WORKER

## 2018-06-29 PROCEDURE — 90853 GROUP PSYCHOTHERAPY: CPT | Mod: NTX

## 2018-06-29 PROCEDURE — 90853 GROUP PSYCHOTHERAPY: CPT | Mod: NTX,,, | Performed by: PSYCHOLOGIST

## 2018-06-29 PROCEDURE — 80307 DRUG TEST PRSMV CHEM ANLYZR: CPT | Mod: NTX

## 2018-06-29 PROCEDURE — 99222 1ST HOSP IP/OBS MODERATE 55: CPT | Mod: NTX,,, | Performed by: PSYCHIATRY & NEUROLOGY

## 2018-06-29 RX ORDER — BUPRENORPHINE AND NALOXONE 2; .5 MG/1; MG/1
FILM, SOLUBLE BUCCAL; SUBLINGUAL
Qty: 42 EACH | Refills: 0 | Status: SHIPPED | OUTPATIENT
Start: 2018-06-29 | End: 2018-07-13 | Stop reason: SDUPTHER

## 2018-06-29 NOTE — PROGRESS NOTES
Group Psychotherapy (PhD/LCSW)    Site: [facility]    Clinical status of patient: Intensive Outpatient Program (IOP)    Date: 6/29/2018    Group Focus: Psychodynamic Group Psychotherapy    Length of service: 69978 - 45-50 minutes    Number of patients in attendance: 12    Referred by: Addictive Behavior Unit Treatment Team    Target symptoms: Alcohol Abuse    Patient's response to treatment: Active Listening    Progress toward goals: Progressing adequately    Interval History: The patient introduced himself to a new group member.  He shared thoughts with a departing member.    Diagnosis: Alcohol Use Disorder, in early remission    Plan: Continue treatment on ABU

## 2018-06-29 NOTE — PLAN OF CARE
06/29/18 1400   Activity/Group Therapy Checklist   Group Relapse Prevention  (Step Work )   Attendance Attended   Follows Direction Followed directions   Group Interactions/Observations Interacted appropriately   Affect/Mood Range Normal range   Affect/Mood Display Appropriate   Goal Progression Progressing

## 2018-06-29 NOTE — TELEPHONE ENCOUNTER
----- Message from Solis Villanueva MD sent at 6/28/2018  7:55 AM CDT -----  Yep, he'll need platelets  ----- Message -----  From: Pauline Magaña MA  Sent: 6/27/2018   2:07 PM  To: Solis Villanueva MD    Patient is scheduled for an ERCP on 7/2. He has low platelets . Please review and advise if you think he should have platelets prior to ERCP

## 2018-06-29 NOTE — PLAN OF CARE
06/29/18 1000   Activity/Group Therapy Checklist   Group Goals/Reflection   Attendance Attended   Follows Direction Followed directions   Group Interactions/Observations Interacted appropriately   Affect/Mood Range Normal range   Affect/Mood Display Appropriate
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
## 2018-06-29 NOTE — TELEPHONE ENCOUNTER
Left a detailed Vm on pt's mother Sanjuanita's phone. She was instructed to have pt come to txp clinic to sign the blood consent after his group therapy. At this time pt has not showed up. If pt cannot come today he must report to txp clinic at 0800 to sign the blood consent prior to his labs and ERCP.

## 2018-06-29 NOTE — PROGRESS NOTES
Group Psychotherapy (PhD/LCSW)    Site: [facility]    Clinical status of patient: Intensive Outpatient Program (IOP)    Date: 6/29/2018    Group Focus: Stress Management    Length of service: 90253 - 45-50 minutes    Number of patients in attendance: 15    Referred by: Addictive Behavior Unit Treatment Team    Target symptoms: Alcohol Abuse    Patient's response to treatment: Active Listening    Progress toward goals: Progressing adequately    Interval History: Group learned mindfulness techniques (breath, senses, progressive muscle relaxation) to improve present-moment awareness, impulsive behavior tendencies, and tolerance of various emotional states.    Diagnosis: Alcohol Use Disorder, in early remission    Plan: Continue treatment on ABU

## 2018-06-29 NOTE — TELEPHONE ENCOUNTER
----- Message from Georgina Rogers MD sent at 6/26/2018  9:02 AM CDT -----  Labs stable, RAPA: 7.5. No action needed.

## 2018-06-30 ENCOUNTER — LAB VISIT (OUTPATIENT)
Dept: LAB | Facility: HOSPITAL | Age: 28
End: 2018-06-30
Attending: INTERNAL MEDICINE
Payer: COMMERCIAL

## 2018-06-30 DIAGNOSIS — D69.6 PLATELET COUNT LESS 100,000 PER CUBIC MILLIMETER: ICD-10-CM

## 2018-06-30 DIAGNOSIS — Z94.4 LIVER TRANSPLANTED: ICD-10-CM

## 2018-06-30 LAB
ABO + RH BLD: NORMAL
BLD GP AB SCN CELLS X3 SERPL QL: NORMAL

## 2018-06-30 PROCEDURE — 36415 COLL VENOUS BLD VENIPUNCTURE: CPT | Mod: NTX

## 2018-06-30 PROCEDURE — 86901 BLOOD TYPING SEROLOGIC RH(D): CPT | Mod: NTX

## 2018-06-30 PROCEDURE — P9035 PLATELET PHERES LEUKOREDUCED: HCPCS | Mod: NTX

## 2018-06-30 NOTE — PROGRESS NOTES
HEMATOLOGIC MALIGNANCIES PROGRESS NOTE    IDENTIFYING STATEMENT   Jhonny Monaco) is a 27 y.o. male with a  of 1990 from [place], [place] with the diagnosis of thromboctyopenia.      ONCOLOGY HISTORY:    1. 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 Anemia of chronic kidney disease    3. Alcoholic cirrhosis s/p liver transplantation on 10/19/2017  4. Substance abuse/dependence    INTERVAL HISTORY:      Mr. Diana returns to clinic after multiple recent hospital stays. This is not his first encounter with me; however, he was encephalopathic on a prior encounter when he was hospitalized. He had what was likely a drug-induced TMA from tacrolimus and is now on sirolimus. He was recently readmitted to the hospital and again noted to have severe thrombocytopenia. Hematology was again consulted and considered ITP unlikely. It was considered that sirolimus was the likely cause of his thrombocytopenia.    He is feeling better overall, but he is concerned about his platelet count and bleeding. He has made a transition to outpatient dialysis. He is also attending sessions regarding his substance abuse issues.    He is accompanied by his mother today. She is similarly anxious about his platelet count.    Past Medical History, Past Social History and Past Family History have been reviewed and are unchanged except as noted in the interval history.    MEDICATIONS:     Current Outpatient Prescriptions on File Prior to Visit   Medication Sig Dispense Refill    amLODIPine (NORVASC) 5 MG tablet Take 1 tablet (5 mg total) by mouth once daily. 30 tablet 11    dronabinol (MARINOL) 2.5 MG capsule Take 1 capsule (2.5 mg total) by mouth once daily. 30 capsule 0    famotidine (PEPCID) 20 MG tablet Take 1 tablet (20 mg total) by mouth every evening. 30 tablet 11    folic acid (FOLVITE) 1 MG tablet Take 1 tablet (1 mg total) by mouth once daily. 30 tablet 2    levETIRAcetam (KEPPRA) 500 MG Tab Take 1 tablet (500 mg  total) by mouth 2 (two) times daily. 60 tablet 11    levothyroxine (SYNTHROID) 75 MCG tablet Take 1 tablet (75 mcg total) by mouth before breakfast. 30 tablet 11    ondansetron (ZOFRAN-ODT) 8 MG TbDL Take 1 tablet (8 mg total) by mouth every 8 (eight) hours as needed (nausea). 30 tablet 1    predniSONE (DELTASONE) 20 MG tablet Take daily by mouth daily: 60mg 6/4-6/10, 50mg (2.5 tab) 6/11-6/17, 40mg (2 tab) 6/18-6/24, 30mg (1.5 tab) 6/25-7/1, 20 mg 7/2/18 75 tablet 11    sevelamer carbonate (RENVELA) 800 mg Tab Take 2 tablets (1,600 mg total) by mouth 3 (three) times daily with meals. 180 tablet 11    sirolimus (RAPAMUNE) 1 MG Tab Take 1 tablet (1 mg total) by mouth once daily. 30 tablet 11    sodium bicarbonate 650 MG tablet Take 1 tablet (650 mg total) by mouth 2 (two) times daily. 60 tablet 11    torsemide (DEMADEX) 20 MG Tab Take 3 tablets (60 mg total) by mouth once daily. 90 tablet 2    traZODone (DESYREL) 50 MG tablet Take ½ tablet (25 mg total) by mouth every evening. 15 tablet 11    ursodiol (ACTIGALL) 300 mg capsule Take 1 capsule (300 mg total) by mouth 2 (two) times daily. 60 capsule 11    venlafaxine (EFFEXOR-XR) 37.5 MG 24 hr capsule Take 1 capsule (37.5 mg total) by mouth once daily. 30 capsule 11     No current facility-administered medications on file prior to visit.        ALLERGIES:   Review of patient's allergies indicates:   Allergen Reactions    Bactrim [sulfamethoxazole-trimethoprim] Other (See Comments)     Mookie Trell Syndrome        ROS:       Review of Systems   Constitutional: Positive for fatigue and unexpected weight change. Negative for diaphoresis and fever.   HENT:   Negative for lump/mass and sore throat.    Eyes: Negative for icterus.   Respiratory: Negative for cough and shortness of breath.    Cardiovascular: Negative for chest pain and palpitations.   Gastrointestinal: Negative for abdominal distention, constipation, diarrhea, nausea and vomiting.   Genitourinary:  "Negative for dysuria and frequency.    Musculoskeletal: Negative for arthralgias, gait problem and myalgias.   Skin: Negative for rash.   Neurological: Negative for dizziness, gait problem and headaches.   Hematological: Negative for adenopathy. Bruises/bleeds easily.   Psychiatric/Behavioral: Positive for depression. The patient is nervous/anxious.        PHYSICAL EXAM:  Vitals:    06/27/18 1308   BP: (!) 141/68   Pulse: 102   Resp: 18   Temp: 98.2 °F (36.8 °C)   TempSrc: Oral   SpO2: 100%   Weight: 61.9 kg (136 lb 7.4 oz)   Height: 5' 6" (1.676 m)   PainSc: 0-No pain       KARNOFSKY PERFORMANCE STATUS 70%  ECOG 2    Physical Exam   Constitutional: He is oriented to person, place, and time. He appears well-developed and well-nourished. No distress.   HENT:   Head: Normocephalic and atraumatic.   Mouth/Throat: Mucous membranes are normal. No oral lesions.   Eyes: Conjunctivae are normal.   Neck: No thyromegaly present.   Cardiovascular: Normal rate, regular rhythm and normal heart sounds.    No murmur heard.  Pulmonary/Chest: Breath sounds normal. He has no wheezes. He has no rales.   Catheter for dialysis is in the chest.   Abdominal: Soft. He exhibits distension. He exhibits no mass. There is no splenomegaly or hepatomegaly. There is no tenderness.   Lymphadenopathy:     He has no cervical adenopathy.        Right cervical: No deep cervical adenopathy present.       Left cervical: No deep cervical adenopathy present.     He has no axillary adenopathy.        Right: No inguinal adenopathy present.        Left: No inguinal adenopathy present.   Neurological: He is alert and oriented to person, place, and time. He has normal strength and normal reflexes. No cranial nerve deficit. Coordination normal.   Skin: No rash noted.   Scattered purpura       LAB:   Results for orders placed or performed in visit on 06/25/18   CBC auto differential   Result Value Ref Range    WBC 3.98 3.90 - 12.70 K/uL    RBC 3.15 (L) 4.60 - " 6.20 M/uL    Hemoglobin 9.9 (L) 14.0 - 18.0 g/dL    Hematocrit 32.1 (L) 40.0 - 54.0 %     (H) 82 - 98 fL    MCH 31.4 (H) 27.0 - 31.0 pg    MCHC 30.8 (L) 32.0 - 36.0 g/dL    RDW 15.0 (H) 11.5 - 14.5 %    Platelets 27 (LL) 150 - 350 K/uL    MPV 10.5 9.2 - 12.9 fL    Immature Granulocytes 0.8 (H) 0.0 - 0.5 %    Gran # (ANC) 2.6 1.8 - 7.7 K/uL    Immature Grans (Abs) 0.03 0.00 - 0.04 K/uL    Lymph # 1.0 1.0 - 4.8 K/uL    Mono # 0.4 0.3 - 1.0 K/uL    Eos # 0.0 0.0 - 0.5 K/uL    Baso # 0.01 0.00 - 0.20 K/uL    nRBC 0 0 /100 WBC    Gran% 64.0 38.0 - 73.0 %    Lymph% 24.6 18.0 - 48.0 %    Mono% 9.8 4.0 - 15.0 %    Eosinophil% 0.5 0.0 - 8.0 %    Basophil% 0.3 0.0 - 1.9 %    Aniso Slight     Poik Slight     Poly Occasional     Hypo Occasional     Ovalocytes Occasional     Differential Method Automated    Comprehensive metabolic panel   Result Value Ref Range    Sodium 145 136 - 145 mmol/L    Potassium 4.6 3.5 - 5.1 mmol/L    Chloride 105 95 - 110 mmol/L    CO2 27 23 - 29 mmol/L    Glucose 95 70 - 110 mg/dL    BUN, Bld 79 (H) 6 - 20 mg/dL    Creatinine 3.6 (H) 0.5 - 1.4 mg/dL    Calcium 9.6 8.7 - 10.5 mg/dL    Total Protein 7.2 6.0 - 8.4 g/dL    Albumin 3.2 (L) 3.5 - 5.2 g/dL    Total Bilirubin 0.7 0.1 - 1.0 mg/dL    Alkaline Phosphatase 169 (H) 55 - 135 U/L    AST 27 10 - 40 U/L    ALT 33 10 - 44 U/L    Anion Gap 13 8 - 16 mmol/L    eGFR if African American 25.1 (A) >60 mL/min/1.73 m^2    eGFR if non  21.7 (A) >60 mL/min/1.73 m^2   Protime-INR   Result Value Ref Range    Prothrombin Time 9.7 9.0 - 12.5 sec    INR 0.9 0.8 - 1.2   Bilirubin, direct   Result Value Ref Range    Bilirubin, Direct 0.5 (H) 0.1 - 0.3 mg/dL   Sirolimus level   Result Value Ref Range    Sirolimus Lvl 7.5 4.0 - 20.0 ng/mL       PROBLEMS ASSESSED THIS VISIT:    1. Thrombocytopenia    2. Chronic kidney disease-mineral and bone disorder    3. Anemia of chronic renal failure, stage 5    4. S/P liver transplant        PLAN:        Thrombocytopenia  Mr. Diana has continued severe thrombocytopenia after his most recent hospital stay. He has had extensive evaluation to date, including evaluation with bone marrow aspiration/biopsy, empiric trial of immunosuppression for ITP, evaluation for HIT, TTP, etc.    It is likely that his ongoing issues with dialysis and continued use of sirolimus contribute. Drug abuse issues may also be at play here.     TPO mimetics are not usually effective for drug-induced thrombocytopenia, but given the need to prevent bleeding, we can attempt this. We will seek authorization for eltrombopag and see if we can improve his platelet count with this.     Otherwise, recommend reducing immunosuppression as tolerated.     Anemia of chronic renal failure  Hemoglobin 9.8 g/dl today. He will likely need ESAs while on dialysis. Will ensure that this is being done per their protocol.     Follow-up in 1 month    Syed Maurice MD  Hematology and Stem Cell Transplant

## 2018-07-02 ENCOUNTER — TELEPHONE (OUTPATIENT)
Dept: TRANSPLANT | Facility: CLINIC | Age: 28
End: 2018-07-02

## 2018-07-02 ENCOUNTER — HOSPITAL ENCOUNTER (OUTPATIENT)
Facility: HOSPITAL | Age: 28
Discharge: HOME | End: 2018-07-02
Attending: INTERNAL MEDICINE | Admitting: INTERNAL MEDICINE
Payer: COMMERCIAL

## 2018-07-02 ENCOUNTER — SURGERY (OUTPATIENT)
Age: 28
End: 2018-07-02

## 2018-07-02 ENCOUNTER — ANESTHESIA (OUTPATIENT)
Dept: ENDOSCOPY | Facility: HOSPITAL | Age: 28
End: 2018-07-02
Payer: COMMERCIAL

## 2018-07-02 ENCOUNTER — TELEPHONE (OUTPATIENT)
Dept: PHARMACY | Facility: CLINIC | Age: 28
End: 2018-07-02

## 2018-07-02 ENCOUNTER — ANESTHESIA EVENT (OUTPATIENT)
Dept: ENDOSCOPY | Facility: HOSPITAL | Age: 28
End: 2018-07-02
Payer: COMMERCIAL

## 2018-07-02 ENCOUNTER — LAB VISIT (OUTPATIENT)
Dept: LAB | Facility: HOSPITAL | Age: 28
End: 2018-07-02
Attending: INTERNAL MEDICINE
Payer: COMMERCIAL

## 2018-07-02 VITALS
TEMPERATURE: 98 F | RESPIRATION RATE: 16 BRPM | HEIGHT: 66 IN | HEART RATE: 98 BPM | SYSTOLIC BLOOD PRESSURE: 132 MMHG | WEIGHT: 140 LBS | BODY MASS INDEX: 22.5 KG/M2 | OXYGEN SATURATION: 99 % | DIASTOLIC BLOOD PRESSURE: 82 MMHG

## 2018-07-02 DIAGNOSIS — N18.6 ESRD ON DIALYSIS: ICD-10-CM

## 2018-07-02 DIAGNOSIS — Z94.4 S/P LIVER TRANSPLANT: Primary | Chronic | ICD-10-CM

## 2018-07-02 DIAGNOSIS — T86.49 BILIARY STRICTURE OF TRANSPLANTED LIVER: ICD-10-CM

## 2018-07-02 DIAGNOSIS — Z94.4 STATUS POST LIVER TRANSPLANT: ICD-10-CM

## 2018-07-02 DIAGNOSIS — D69.1 ABNORMAL PLATELETS: ICD-10-CM

## 2018-07-02 DIAGNOSIS — Z99.2 ESRD ON DIALYSIS: ICD-10-CM

## 2018-07-02 DIAGNOSIS — R10.9 ABDOMINAL PAIN: ICD-10-CM

## 2018-07-02 DIAGNOSIS — K83.1 BILIARY STRICTURE OF TRANSPLANTED LIVER: ICD-10-CM

## 2018-07-02 DIAGNOSIS — Z94.4 LIVER REPLACED BY TRANSPLANT: ICD-10-CM

## 2018-07-02 DIAGNOSIS — K74.60 HEPATIC CIRRHOSIS, UNSPECIFIED HEPATIC CIRRHOSIS TYPE, UNSPECIFIED WHETHER ASCITES PRESENT: ICD-10-CM

## 2018-07-02 DIAGNOSIS — Z94.4 LIVER TRANSPLANTED: Primary | ICD-10-CM

## 2018-07-02 DIAGNOSIS — Z94.4 LIVER TRANSPLANTED: ICD-10-CM

## 2018-07-02 LAB
ALBUMIN SERPL BCP-MCNC: 3 G/DL
ALP SERPL-CCNC: 179 U/L
ALT SERPL W/O P-5'-P-CCNC: 31 U/L
ANION GAP SERPL CALC-SCNC: 13 MMOL/L
ANISOCYTOSIS BLD QL SMEAR: SLIGHT
AST SERPL-CCNC: 27 U/L
BASOPHILS # BLD AUTO: 0.01 K/UL
BASOPHILS NFR BLD: 0.2 %
BILIRUB DIRECT SERPL-MCNC: 0.5 MG/DL
BILIRUB SERPL-MCNC: 0.7 MG/DL
BLD PROD TYP BPU: NORMAL
BLOOD UNIT EXPIRATION DATE: NORMAL
BLOOD UNIT TYPE CODE: 5100
BLOOD UNIT TYPE: NORMAL
BUN SERPL-MCNC: 81 MG/DL
CALCIUM SERPL-MCNC: 9.5 MG/DL
CHLORIDE SERPL-SCNC: 103 MMOL/L
CO2 SERPL-SCNC: 24 MMOL/L
CODING SYSTEM: NORMAL
CREAT SERPL-MCNC: 2.7 MG/DL
DIFFERENTIAL METHOD: ABNORMAL
DISPENSE STATUS: NORMAL
EOSINOPHIL # BLD AUTO: 0 K/UL
EOSINOPHIL NFR BLD: 0.5 %
ERYTHROCYTE [DISTWIDTH] IN BLOOD BY AUTOMATED COUNT: 15 %
EST. GFR  (AFRICAN AMERICAN): 35.5 ML/MIN/1.73 M^2
EST. GFR  (NON AFRICAN AMERICAN): 30.7 ML/MIN/1.73 M^2
GLUCOSE SERPL-MCNC: 98 MG/DL
HCT VFR BLD AUTO: 32.8 %
HGB BLD-MCNC: 10.6 G/DL
HYPOCHROMIA BLD QL SMEAR: ABNORMAL
IMM GRANULOCYTES # BLD AUTO: 0.15 K/UL
IMM GRANULOCYTES NFR BLD AUTO: 3.5 %
INR PPP: 0.9
LYMPHOCYTES # BLD AUTO: 0.9 K/UL
LYMPHOCYTES NFR BLD: 21.7 %
MCH RBC QN AUTO: 32.3 PG
MCHC RBC AUTO-ENTMCNC: 32.3 G/DL
MCV RBC AUTO: 100 FL
MONOCYTES # BLD AUTO: 0.6 K/UL
MONOCYTES NFR BLD: 13.2 %
NEUTROPHILS # BLD AUTO: 2.6 K/UL
NEUTROPHILS NFR BLD: 60.9 %
NRBC BLD-RTO: 0 /100 WBC
PLATELET # BLD AUTO: 25 K/UL
PLATELET BLD QL SMEAR: ABNORMAL
PMV BLD AUTO: 11.6 FL
POLYCHROMASIA BLD QL SMEAR: ABNORMAL
POTASSIUM SERPL-SCNC: 4 MMOL/L
POTASSIUM SERPL-SCNC: 4 MMOL/L
PROT SERPL-MCNC: 6.7 G/DL
PROTHROMBIN TIME: 9.6 SEC
RBC # BLD AUTO: 3.28 M/UL
SIROLIMUS BLD-MCNC: 5.6 NG/ML
SODIUM SERPL-SCNC: 140 MMOL/L
TRANS PLATPHERESIS VOL PATIENT: NORMAL ML
WBC # BLD AUTO: 4.33 K/UL

## 2018-07-02 PROCEDURE — 63600175 PHARM REV CODE 636 W HCPCS: Mod: TXP | Performed by: NURSE ANESTHETIST, CERTIFIED REGISTERED

## 2018-07-02 PROCEDURE — 43275 ERCP REMOVE FORGN BODY DUCT: CPT | Mod: NTX | Performed by: INTERNAL MEDICINE

## 2018-07-02 PROCEDURE — C1769 GUIDE WIRE: HCPCS | Mod: NTX | Performed by: INTERNAL MEDICINE

## 2018-07-02 PROCEDURE — 80195 ASSAY OF SIROLIMUS: CPT | Mod: TXP

## 2018-07-02 PROCEDURE — 36415 COLL VENOUS BLD VENIPUNCTURE: CPT | Mod: TXP

## 2018-07-02 PROCEDURE — 27202125 HC BALLOON, EXTRACTION (ANY): Mod: NTX | Performed by: INTERNAL MEDICINE

## 2018-07-02 PROCEDURE — 94761 N-INVAS EAR/PLS OXIMETRY MLT: CPT | Mod: NTX

## 2018-07-02 PROCEDURE — 80053 COMPREHEN METABOLIC PANEL: CPT | Mod: NTX

## 2018-07-02 PROCEDURE — 37000008 HC ANESTHESIA 1ST 15 MINUTES: Mod: NTX | Performed by: INTERNAL MEDICINE

## 2018-07-02 PROCEDURE — 43275 ERCP REMOVE FORGN BODY DUCT: CPT | Mod: NTX,,, | Performed by: INTERNAL MEDICINE

## 2018-07-02 PROCEDURE — P9035 PLATELET PHERES LEUKOREDUCED: HCPCS | Mod: NTX

## 2018-07-02 PROCEDURE — 74328 X-RAY BILE DUCT ENDOSCOPY: CPT | Mod: TXP | Performed by: INTERNAL MEDICINE

## 2018-07-02 PROCEDURE — 37000009 HC ANESTHESIA EA ADD 15 MINS: Mod: NTX | Performed by: INTERNAL MEDICINE

## 2018-07-02 PROCEDURE — 82248 BILIRUBIN DIRECT: CPT | Mod: TXP

## 2018-07-02 PROCEDURE — 74328 X-RAY BILE DUCT ENDOSCOPY: CPT | Mod: 26,NTX,, | Performed by: INTERNAL MEDICINE

## 2018-07-02 PROCEDURE — D9220A PRA ANESTHESIA: Mod: ANES,NTX,, | Performed by: ANESTHESIOLOGY

## 2018-07-02 PROCEDURE — 85025 COMPLETE CBC W/AUTO DIFF WBC: CPT | Mod: TXP

## 2018-07-02 PROCEDURE — D9220A PRA ANESTHESIA: Mod: CRNA,NTX,, | Performed by: NURSE ANESTHETIST, CERTIFIED REGISTERED

## 2018-07-02 PROCEDURE — 85610 PROTHROMBIN TIME: CPT | Mod: TXP

## 2018-07-02 PROCEDURE — 25000003 PHARM REV CODE 250: Mod: NTX | Performed by: INTERNAL MEDICINE

## 2018-07-02 PROCEDURE — 63600175 PHARM REV CODE 636 W HCPCS: Mod: NTX | Performed by: INTERNAL MEDICINE

## 2018-07-02 RX ORDER — LIDOCAINE HCL/PF 100 MG/5ML
SYRINGE (ML) INTRAVENOUS
Status: DISCONTINUED | OUTPATIENT
Start: 2018-07-02 | End: 2018-07-02

## 2018-07-02 RX ORDER — CIPROFLOXACIN 250 MG/1
250 TABLET, FILM COATED ORAL EVERY 12 HOURS
Qty: 10 TABLET | Refills: 0 | Status: SHIPPED | OUTPATIENT
Start: 2018-07-02 | End: 2018-07-23

## 2018-07-02 RX ORDER — HYDROCODONE BITARTRATE AND ACETAMINOPHEN 500; 5 MG/1; MG/1
TABLET ORAL ONCE
Status: DISCONTINUED | OUTPATIENT
Start: 2018-07-02 | End: 2018-07-02 | Stop reason: HOSPADM

## 2018-07-02 RX ORDER — PROPOFOL 10 MG/ML
VIAL (ML) INTRAVENOUS
Status: DISCONTINUED | OUTPATIENT
Start: 2018-07-02 | End: 2018-07-02

## 2018-07-02 RX ORDER — CIPROFLOXACIN 500 MG/1
500 TABLET ORAL 2 TIMES DAILY
Qty: 10 TABLET | Refills: 0 | Status: SHIPPED | OUTPATIENT
Start: 2018-07-02 | End: 2018-07-02

## 2018-07-02 RX ORDER — PROPOFOL 10 MG/ML
VIAL (ML) INTRAVENOUS CONTINUOUS PRN
Status: DISCONTINUED | OUTPATIENT
Start: 2018-07-02 | End: 2018-07-02

## 2018-07-02 RX ORDER — FENTANYL CITRATE 50 UG/ML
INJECTION, SOLUTION INTRAMUSCULAR; INTRAVENOUS
Status: DISCONTINUED | OUTPATIENT
Start: 2018-07-02 | End: 2018-07-02

## 2018-07-02 RX ORDER — MIDAZOLAM HYDROCHLORIDE 1 MG/ML
INJECTION, SOLUTION INTRAMUSCULAR; INTRAVENOUS
Status: DISCONTINUED | OUTPATIENT
Start: 2018-07-02 | End: 2018-07-02

## 2018-07-02 RX ORDER — ONDANSETRON 2 MG/ML
INJECTION INTRAMUSCULAR; INTRAVENOUS
Status: DISCONTINUED | OUTPATIENT
Start: 2018-07-02 | End: 2018-07-02

## 2018-07-02 RX ORDER — CIPROFLOXACIN 2 MG/ML
400 INJECTION, SOLUTION INTRAVENOUS ONCE
Status: COMPLETED | OUTPATIENT
Start: 2018-07-02 | End: 2018-07-02

## 2018-07-02 RX ORDER — SODIUM CHLORIDE 9 MG/ML
INJECTION, SOLUTION INTRAVENOUS CONTINUOUS
Status: DISCONTINUED | OUTPATIENT
Start: 2018-07-02 | End: 2018-07-02 | Stop reason: HOSPADM

## 2018-07-02 RX ORDER — SODIUM CHLORIDE 0.9 % (FLUSH) 0.9 %
3 SYRINGE (ML) INJECTION
Status: DISCONTINUED | OUTPATIENT
Start: 2018-07-02 | End: 2018-07-02 | Stop reason: HOSPADM

## 2018-07-02 RX ADMIN — SODIUM CHLORIDE: 0.9 INJECTION, SOLUTION INTRAVENOUS at 09:07

## 2018-07-02 RX ADMIN — FENTANYL CITRATE 25 MCG: 50 INJECTION, SOLUTION INTRAMUSCULAR; INTRAVENOUS at 09:07

## 2018-07-02 RX ADMIN — LIDOCAINE HYDROCHLORIDE 60 MG: 20 INJECTION, SOLUTION INTRAVENOUS at 09:07

## 2018-07-02 RX ADMIN — ONDANSETRON 4 MG: 2 INJECTION INTRAMUSCULAR; INTRAVENOUS at 09:07

## 2018-07-02 RX ADMIN — MIDAZOLAM HYDROCHLORIDE 2 MG: 1 INJECTION, SOLUTION INTRAMUSCULAR; INTRAVENOUS at 09:07

## 2018-07-02 RX ADMIN — CIPROFLOXACIN 400 MG: 2 INJECTION, SOLUTION INTRAVENOUS at 09:07

## 2018-07-02 RX ADMIN — PROPOFOL 80 MG: 10 INJECTION, EMULSION INTRAVENOUS at 09:07

## 2018-07-02 RX ADMIN — PROPOFOL 200 MCG/KG/MIN: 10 INJECTION, EMULSION INTRAVENOUS at 09:07

## 2018-07-02 NOTE — TELEPHONE ENCOUNTER
Left a detailed VM on pt's mother's phone (Sanjuanita). LEft detailed msg about Dr. Rogers wanting pt to take the 1/2 strength Cipro for five days. Instructions given to break tab in half and to only take 1/2 tab twice daily for 5 days. Asked for a return call to make sure she received msg and that she has no further questions.

## 2018-07-02 NOTE — PROGRESS NOTES
Pt concerned about taking ciprofloxacin at home due to renal impairment.  Spoke to Dr. Villanueva about antibiotic. OK for pt to not take ciprofloxacin at home per Dr. Villanueva.

## 2018-07-02 NOTE — ANESTHESIA PREPROCEDURE EVALUATION
07/02/2018  Jhonny Diana is a 27 y.o., male.  Patient Active Problem List   Diagnosis    S/P liver transplant    Prophylactic immunotherapy    Long-term use of immunosuppressant medication    At risk for opportunistic infections    Seizure    Ascites due to alcoholic cirrhosis    Biliary stricture of transplanted liver    Hypothyroidism    Severe protein-calorie malnutrition    ESRD Monday/Friday    Vitamin D deficiency    Alcohol use disorder, severe, in early remission    Anemia of chronic renal failure    Chronic kidney disease-mineral and bone disorder    Other pancytopenia    Thrombocytopenia    Abdominal pain    Anxiety about health    Opioid dependence         Pre-op Assessment    I have reviewed the Patient Summary Reports.      I have reviewed the Medications.     Review of Systems  Anesthesia Hx:  No problems with previous Anesthesia Denies Hx of Anesthetic complications  History of prior surgery of interest to airway management or planning:  Denies Personal Hx of Anesthesia complications.   Social:  Former Smoker, No Alcohol Use    Hematology/Oncology:     Oncology Normal    -- Anemia:   EENT/Dental:EENT/Dental Normal   Cardiovascular:   Hypertension  Denies Angina.    Pulmonary:   Denies Shortness of breath.    Renal/:   Chronic Renal Disease, ESRD, Dialysis    Hepatic/GI:   Liver Disease, Hepatitis    Neurological:   Seizures    Endocrine:   Hypothyroidism        Physical Exam  General:  Well nourished    Airway/Jaw/Neck:  Airway Findings: Mouth Opening: Normal Tongue: Normal  General Airway Assessment: Adult  Mallampati: II  Improves to II with phonation.  TM Distance: Normal, at least 6 cm      Dental:  Dental Findings: In tact   Chest/Lungs:  Chest/Lungs Findings: Normal Respiratory Rate     Heart/Vascular:  Heart Findings: Rate: Normal        Mental Status:  Mental Status  Findings:  Cooperative, Alert and Oriented         Anesthesia Plan  Type of Anesthesia, risks & benefits discussed:  Anesthesia Type:  general  Patient's Preference: General  Intra-op Monitoring Plan: standard ASA monitors  Intra-op Monitoring Plan Comments:   Post Op Pain Control Plan:   Post Op Pain Control Plan Comments:   Induction:   IV  Beta Blocker:  Patient is not currently on a Beta-Blocker (No further documentation required).       Informed Consent: Patient understands risks and agrees with Anesthesia plan.  Questions answered. Anesthesia consent signed with patient.  ASA Score: 4     Day of Surgery Review of History & Physical:    H&P update referred to the surgeon.     Anesthesia Plan Notes: K 4.0 today  Platelets 20,000; will transfuse platelets prior to and during procedure.        Ready For Surgery From Anesthesia Perspective.

## 2018-07-02 NOTE — ANESTHESIA POSTPROCEDURE EVALUATION
"Anesthesia Post Evaluation    Patient: Jhonny Diana    Procedure(s) Performed: Procedure(s) (LRB):  ERCP (N/A)    Final Anesthesia Type: general  Patient location during evaluation: PACU  Patient participation: Yes- Able to Participate  Level of consciousness: awake and alert and oriented  Post-procedure vital signs: reviewed and stable  Pain management: adequate  Airway patency: patent  PONV status at discharge: No PONV  Anesthetic complications: no      Cardiovascular status: blood pressure returned to baseline and hemodynamically stable  Respiratory status: unassisted, spontaneous ventilation and room air  Hydration status: euvolemic  Follow-up not needed.        Visit Vitals  /82 (BP Location: Left arm, Patient Position: Lying)   Pulse 98   Temp 36.9 °C (98.4 °F) (Temporal)   Resp 16   Ht 5' 6" (1.676 m)   Wt 63.5 kg (140 lb)   SpO2 99%   BMI 22.60 kg/m²       Pain/Diogenes Score: Pain Assessment Performed: Yes (7/2/2018 11:15 AM)  Presence of Pain: denies (7/2/2018 11:15 AM)  Diogenes Score: 10 (7/2/2018 11:15 AM)      "

## 2018-07-02 NOTE — DISCHARGE INSTRUCTIONS
PATIENT INSTRUCTIONS  POST-ANESTHESIA    IMMEDIATELY FOLLOWING SURGERY:  Do not drive or operate machinery for the first twenty four hours after surgery.  Do not make any important decisions for twenty four hours after surgery or while taking narcotic pain medications or sedatives.  If you develop intractable nausea and vomiting or a severe headache please notify your doctor immediately.    FOLLOW-UP:  Please make an appointment with your surgeon as instructed. You do not need to follow up with anesthesia unless specifically instructed to do so.    WOUND CARE INSTRUCTIONS (if applicable):  Keep a dry clean dressing on the anesthesia/puncture wound site if there is drainage.  Once the wound has quit draining you may leave it open to air.  Generally you should leave the bandage intact for twenty four hours unless there is drainage.  If the epidural site drains for more than 36-48 hours please call the anesthesia department.    QUESTIONS?:  Please feel free to call your physician or the hospital  if you have any questions, and they will be happy to assist you.       [facility] Anesthesia Department  [address] [facility]  [phone]

## 2018-07-02 NOTE — [employer] PATIENT INSTRUCTIONS
Discharge Summary/Instructions after an Endoscopic Procedure  Patient Name: Jhonny Diana  Patient MRN: [id number]  Patient YOB: 1990 Monday, July 02, 2018  Solis Villanueva MD  RESTRICTIONS:  During your procedure today, you received medications for sedation.  These   medications may affect your judgment, balance and coordination.  Therefore,   for 24 hours, you have the following restrictions:   - DO NOT drive a car, operate machinery, make legal/financial decisions,   sign important papers or drink alcohol.    ACTIVITY:  Today: no heavy lifting, straining or running due to procedural   sedation/anesthesia.  The following day: return to full activity including work.  DIET:  Eat and drink normally unless instructed otherwise.     TREATMENT FOR COMMON SIDE EFFECTS:  - Mild abdominal pain, nausea, belching, bloating or excessive gas:  rest,   eat lightly and use a heating pad.  - Sore Throat: treat with throat lozenges and/or gargle with warm salt   water.  - Because air was used during the procedure, expelling large amounts of air   from your rectum or belching is normal.  - If a bowel prep was taken, you may not have a bowel movement for 1-3 days.    This is normal.  SYMPTOMS TO WATCH FOR AND REPORT TO YOUR PHYSICIAN:  1. Abdominal pain or bloating, other than gas cramps.  2. Chest pain.  3. Back pain.  4. Signs of infection such as: chills or fever occurring within 24 hours   after the procedure.  5. Rectal bleeding, which would show as bright red, maroon, or black stools.   (A tablespoon of blood from the rectum is not serious, especially if   hemorrhoids are present.)  6. Vomiting.  7. Weakness or dizziness.  GO DIRECTLY TO THE NEAREST EMERGENCY ROOM IF YOU HAVE ANY OF THE FOLLOWING:      Difficulty breathing              Chills and/or fever over 101 F   Persistent vomiting and/or vomiting blood   Severe abdominal pain   Severe chest pain   Black, tarry stools   Bleeding- more than one tablespoon   Any  other symptom or condition that you feel may need urgent attention  Your doctor recommends these additional instructions:  If any biopsies were taken, your doctors clinic will contact you in 1 to 2   weeks with any results.  - Discharge patient to home.   - Resume previous diet.   - Continue present medications.   - Cipro (ciprofloxacin) 500 mg PO BID for 5 weeks.   - Return to referring physician as previously scheduled.   - Repeat ERCP PRN for retreatment.  For questions, problems or results please call your physician - Solis Villanueva MD at Work:  [phone].  [facility], EMERGENCY ROOM PHONE NUMBER: [phone]  IF A COMPLICATION OR EMERGENCY SITUATION ARISES AND YOU ARE UNABLE TO REACH   YOUR PHYSICIAN - GO DIRECTLY TO THE EMERGENCY ROOM.  Solis Villanueva MD  7/2/2018 10:07:32 AM  This report has been verified and signed electronically.  [employer]

## 2018-07-02 NOTE — ASSESSMENT & PLAN NOTE
Mr. Diana has continued severe thrombocytopenia after his most recent hospital stay. He has had extensive evaluation to date, including evaluation with bone marrow aspiration/biopsy, empiric trial of immunosuppression for ITP, evaluation for HIT, TTP, etc.    It is likely that his ongoing issues with dialysis and continued use of sirolimus contribute. Drug abuse issues may also be at play here.     TPO mimetics are not usually effective for drug-induced thrombocytopenia, but given the need to prevent bleeding, we can attempt this. We will seek authorization for eltrombopag and see if we can improve his platelet count with this.     Otherwise, recommend reducing immunosuppression as tolerated.

## 2018-07-02 NOTE — TELEPHONE ENCOUNTER
ERCP note reviewed. Previous biliary stent removed. No stricture noted, no new stent placed.   Dr. Villanueva recommends cipro BID for 5 weeks.    Please advise patient to take cipro 250 (renal dosing) mg BID for 5 days.    Thank you

## 2018-07-02 NOTE — H&P
Short Stay Endoscopy History and Physical    PCP - Primary Doctor No  Referring Physician - Lara Nugent MD  [address] [place] [address]    Procedure - ercp  ASA - per anesthesia  Mallampati - per anesthesia  History of Anesthesia problems - no  Family history Anesthesia problems -  no   Plan of anesthesia - General    HPI:  This is a 27 y.o. male here for evaluation of: stricture    Reflux - no  Dysphagia - no  Abdominal pain - no  Diarrhea - no    ROS:  Constitutional: No fevers, chills, No weight loss  CV: No chest pain  Pulm: No cough, No shortness of breath  Ophtho: No vision changes  GI: see HPI  Derm: No rash    Medical History:  has a past medical history of Alcoholic hepatitis with ascites; Anemia; ESRD on dialysis; History of hematemesis (9/28/2017); Hypertension; Seizures; Thrombocytopenia; and Transplanted liver.    Surgical History:  has a past surgical history that includes Appendectomy; Liver transplant; Esophagogastroduodenoscopy; and ERCP.    Family History: family history includes No Known Problems in his mother..    Social History:  reports that he has quit smoking. His smoking use included Cigarettes. He started smoking about 16 months ago. He has a 10.00 pack-year smoking history. He has never used smokeless tobacco. He reports that he does not drink alcohol or use drugs.    Review of patient's allergies indicates:   Allergen Reactions    Bactrim [sulfamethoxazole-trimethoprim] Other (See Comments)     Mookie Trell Syndrome       Medications:   Prescriptions Prior to Admission   Medication Sig Dispense Refill Last Dose    amLODIPine (NORVASC) 5 MG tablet Take 1 tablet (5 mg total) by mouth once daily. 30 tablet 11 7/2/2018 at Unknown time    buprenorphine-naloxone 2-0.5 mg (SUBOXONE) 2-0.5 mg Film Take 2 film sublingually in the morning and 1 film sublingually in the evening 42 each 0 7/2/2018 at Unknown time    famotidine (PEPCID) 20 MG tablet Take 1 tablet (20 mg  total) by mouth every evening. 30 tablet 11 7/1/2018 at Unknown time    folic acid (FOLVITE) 1 MG tablet Take 1 tablet (1 mg total) by mouth once daily. 30 tablet 2 7/2/2018 at Unknown time    levETIRAcetam (KEPPRA) 500 MG Tab Take 1 tablet (500 mg total) by mouth 2 (two) times daily. 60 tablet 11 7/2/2018 at Unknown time    levothyroxine (SYNTHROID) 75 MCG tablet Take 1 tablet (75 mcg total) by mouth before breakfast. 30 tablet 11 7/2/2018 at Unknown time    predniSONE (DELTASONE) 20 MG tablet Take daily by mouth daily: 60mg 6/4-6/10, 50mg (2.5 tab) 6/11-6/17, 40mg (2 tab) 6/18-6/24, 30mg (1.5 tab) 6/25-7/1, 20 mg 7/2/18 75 tablet 11 7/2/2018 at Unknown time    sevelamer carbonate (RENVELA) 800 mg Tab Take 2 tablets (1,600 mg total) by mouth 3 (three) times daily with meals. 180 tablet 11 7/1/2018 at Unknown time    sirolimus (RAPAMUNE) 1 MG Tab Take 1 tablet (1 mg total) by mouth once daily. 30 tablet 11 7/2/2018 at Unknown time    sodium bicarbonate 650 MG tablet Take 1 tablet (650 mg total) by mouth 2 (two) times daily. 60 tablet 11 7/2/2018 at Unknown time    torsemide (DEMADEX) 20 MG Tab Take 3 tablets (60 mg total) by mouth once daily. 90 tablet 2 7/2/2018 at Unknown time    ursodiol (ACTIGALL) 300 mg capsule Take 1 capsule (300 mg total) by mouth 2 (two) times daily. 60 capsule 11 7/2/2018 at Unknown time    venlafaxine (EFFEXOR-XR) 37.5 MG 24 hr capsule Take 1 capsule (37.5 mg total) by mouth once daily. 30 capsule 11 7/2/2018 at Unknown time    dronabinol (MARINOL) 2.5 MG capsule Take 1 capsule (2.5 mg total) by mouth once daily. 30 capsule 0 Unknown at Unknown time    eltrombopag (PROMACTA) 50 MG Tab Take 1 tablet (50 mg total) by mouth once daily. 30 tablet 11 Unknown at Unknown time    ondansetron (ZOFRAN-ODT) 8 MG TbDL Take 1 tablet (8 mg total) by mouth every 8 (eight) hours as needed (nausea). 30 tablet 1 More than a month at Unknown time    traZODone (DESYREL) 50 MG tablet Take ½  tablet (25 mg total) by mouth every evening. 15 tablet 11 More than a month at Unknown time       Physical Exam:    Vital Signs:   Vitals:    07/02/18 0823   BP: 138/82   Pulse: 107   Resp: 17   Temp: 98.8 °F (37.1 °C)       General Appearance: Well appearing in no acute distress  Eyes:    No scleral icterus  ENT: Neck supple  Lungs: CTA anteriorly  Heart:  Regular rate  Abdomen: Soft, non tender, non distended with normal bowel sounds.  Extremities: No edema  Skin: No rash    Labs:  Lab Results   Component Value Date    WBC 4.33 07/02/2018    HGB 10.6 (L) 07/02/2018    HCT 32.8 (L) 07/02/2018    PLT 22 (LL) 06/27/2018    CHOL 140 06/01/2018    TRIG 89 06/01/2018    HDL 47 06/01/2018    ALT 31 07/02/2018    AST 27 07/02/2018     07/02/2018    K 4.0 07/02/2018    K 4.0 07/02/2018     07/02/2018    CREATININE 2.7 (H) 07/02/2018    BUN 81 (H) 07/02/2018    CO2 24 07/02/2018    TSH 3.089 05/09/2018    INR 0.9 07/02/2018    HGBA1C <4.0 (A) 10/18/2017       I have explained the risks and benefits of this endoscopic procedure to the patient including but not limited to bleeding, inflammation, infection, perforation, and death.      Solis Villanueva MD

## 2018-07-02 NOTE — TRANSFER OF CARE
"Anesthesia Transfer of Care Note    Patient: Jhonny Diana    Procedure(s) Performed: Procedure(s) (LRB):  ERCP (N/A)    Patient location: PACU    Anesthesia Type: general    Transport from OR: Transported from OR on room air with adequate spontaneous ventilation    Post pain: adequate analgesia    Post assessment: tolerated procedure well and no apparent anesthetic complications    Post vital signs: stable    Level of consciousness: awake, alert and oriented    Nausea/Vomiting: no nausea/vomiting    Complications: none    Transfer of care protocol was followed      Last vitals:   Visit Vitals  /84   Pulse 102   Temp 36.7 °C (98.1 °F) (Temporal)   Resp 17   Ht 5' 6" (1.676 m)   Wt 63.5 kg (140 lb)   SpO2 99%   BMI 22.60 kg/m²     "

## 2018-07-02 NOTE — ASSESSMENT & PLAN NOTE
Hemoglobin 9.8 g/dl today. He will likely need ESAs while on dialysis. Will ensure that this is being done per their protocol.

## 2018-07-02 NOTE — PLAN OF CARE
Discharge instructions reviewed with pt and mother, verbalized understanding.  VSS, no c/o pain or nausea, tolerating PO fluids well.  Consents in chart, IV to be removed prior to discharge.

## 2018-07-03 ENCOUNTER — HOSPITAL ENCOUNTER (OUTPATIENT)
Dept: PSYCHIATRY | Facility: HOSPITAL | Age: 28
Discharge: HOME | End: 2018-07-03
Attending: PSYCHIATRY & NEUROLOGY
Payer: COMMERCIAL

## 2018-07-03 VITALS — RESPIRATION RATE: 16 BRPM | HEART RATE: 106 BPM | SYSTOLIC BLOOD PRESSURE: 131 MMHG | DIASTOLIC BLOOD PRESSURE: 77 MMHG

## 2018-07-03 DIAGNOSIS — R45.89 ANXIETY ABOUT HEALTH: ICD-10-CM

## 2018-07-03 DIAGNOSIS — F10.21 ALCOHOL USE DISORDER, SEVERE, IN EARLY REMISSION: Primary | ICD-10-CM

## 2018-07-03 LAB — BREATH ALCOHOL: 0

## 2018-07-03 PROCEDURE — 90853 GROUP PSYCHOTHERAPY: CPT | Mod: NTX,,, | Performed by: PSYCHOLOGIST

## 2018-07-03 PROCEDURE — 99232 SBSQ HOSP IP/OBS MODERATE 35: CPT | Mod: ,,, | Performed by: PSYCHIATRY & NEUROLOGY

## 2018-07-03 PROCEDURE — 90853 GROUP PSYCHOTHERAPY: CPT | Mod: NTX | Performed by: SOCIAL WORKER

## 2018-07-03 PROCEDURE — 90853 GROUP PSYCHOTHERAPY: CPT | Mod: NTX

## 2018-07-03 RX ORDER — MIRTAZAPINE 7.5 MG/1
7.5 TABLET, FILM COATED ORAL NIGHTLY
Qty: 30 TABLET | Refills: 2 | Status: SHIPPED | OUTPATIENT
Start: 2018-07-03 | End: 2018-07-23

## 2018-07-03 NOTE — PROGRESS NOTES
Group Psychotherapy (PhD/LCSW)    Site: [facility]    Clinical status of patient: Intensive Outpatient Program (IOP)    Date: 7/3/2018    Group Focus: Stress Management    Length of service: 38922 - 45-50 minutes    Number of patients in attendance: 14    Referred by: Addictive Behavior Unit Treatment Team    Target symptoms: Alcohol Abuse    Patient's response to treatment: Active Listening and Self-disclosure    Progress toward goals: Progressing adequately    Interval History: Discussed and practiced using positive self-talk to address stressors.    Diagnosis: alcohol use disorder, severe, in early remission    Plan: Continue treatment on ABU

## 2018-07-03 NOTE — PROGRESS NOTES
7/3/2018 5:32 PM  Name: Jhonny Diana  : 1990  Start Date: 18    [facility] Intensive Outpatient Program   Progress Note    Status: Intensive Outpatient Program (IOP)    HPI:  Jhonny Diana is a 27 y.o. male who presents for admission to ABU with principal problem of opioid use, as well as hx of alcohol use disorder.      Pt is s/p liver transplant in 10/2017 due to acute alcoholic hepatitis. He has a hx of heavy alcohol use since his teens. He required emergent liver transplantation prior to being able to undergo any type of substance abuse treatment. It was recommended that pt attend the ABU program after he received his liver transplant as he was deemed to be very high risk for relapse post-transplant. However pt never followed up in the ABU due to a number of post-op medical complications including ESRD requiring HD (Mon/Fri). In 2018 the addiction psychiatry team was consulted while pt was admitted to the hospital due to concerns for opioid addiction. Pt was started on Suboxone and again recommended to f/u with the ABU for substance abuse treatment.      Today pt reports that he is feeling well physically. He confirms hx of alcohol use since the age of 18. He escalated to drinking a fifth of liquor daily in his early to mid twenties. He attempted to cut back a number of times but was unsuccessful. He has attended AA meetings in the past but has never been to a formal rehab program. He minimizes the effects that alcohol had in his life as he states that he mostly drank by himself, so it did not affect his relationships. He does admit that alcohol was a problem for him however, and recognizes the need for continued life long sobriety following his liver transplant.      Regarding his opiate use, pt reports using 10 mg of oxycontin every 4 hours as was prescribed to him. He reports that he began getting prescriptions for opiates a few months prior to his transplant, and continued to use them until this  "month when he was transitioned to Suboxone. He denies ever buying opiates or using meds which were not prescribed to him. He denies ever injecting or snorting them. He does report feeling better both physically and mentally on Suboxone. He reports feeling like his head is clearer and pain is better controlled. He denies any current side effects from Suboxone or cravings to use other opiates.      Last drink/use: Last use of alcohol 9/2017. Last use of narcotic pain medications (other than Suboxone) 6/14/18  Average consumption: drank a fifth of liquor daily. Used Oxycontin 10 mg PO q4h  Withdrawal: yes, hx of tremors and anxiety from alcohol withdrawal. No hx of complicated alcohol withdrawal or opiate withdrawal  Spouse/partner consumption: n/a  Rehab/Detox: no  AA/NA: yes, has been to AA before. Went for approx 1 year prior to his transplant. States "not a fan" due to Jehovah's witness aspect    SUBJECTIVE:     Interval Hx:  On my interview this morning, patient reported doing well over the weekend.  Patient required to take off the program yesterday in order to have ERCP performed in the hospital. He states that procedure went well.  He reports that Suboxone which was started by Dr. Umana has been working well to control his pain.  He states that he has been having persistent problems with sleep and that he wants to be able to come off the Trazodone. Reports dealing with some stress about his ongoing medical issues. Discussed potential trial of Remeron to help with anxiety and sleep which patient was agreeable to.  He states that he has been making it to his AA meetings.  Denies any cravings at this time.    Medication Side Effects: None  Cravings: no  No other acute psychiatric issues reported at this time.    Scheduled Meds:   Current Outpatient Prescriptions on File Prior to Encounter   Medication Sig Dispense Refill    amLODIPine (NORVASC) 5 MG tablet Take 1 tablet (5 mg total) by mouth once daily. 30 tablet 11 "    buprenorphine-naloxone 2-0.5 mg (SUBOXONE) 2-0.5 mg Film Take 2 film sublingually in the morning and 1 film sublingually in the evening 42 each 0    ciprofloxacin HCl (CIPRO) 250 MG tablet Take 1 tablet (250 mg total) by mouth every 12 (twelve) hours. 10 tablet 0    dronabinol (MARINOL) 2.5 MG capsule Take 1 capsule (2.5 mg total) by mouth once daily. 30 capsule 0    eltrombopag (PROMACTA) 50 MG Tab Take 1 tablet (50 mg total) by mouth once daily. 30 tablet 11    famotidine (PEPCID) 20 MG tablet Take 1 tablet (20 mg total) by mouth every evening. 30 tablet 11    folic acid (FOLVITE) 1 MG tablet Take 1 tablet (1 mg total) by mouth once daily. 30 tablet 2    levETIRAcetam (KEPPRA) 500 MG Tab Take 1 tablet (500 mg total) by mouth 2 (two) times daily. 60 tablet 11    levothyroxine (SYNTHROID) 75 MCG tablet Take 1 tablet (75 mcg total) by mouth before breakfast. 30 tablet 11    ondansetron (ZOFRAN-ODT) 8 MG TbDL Take 1 tablet (8 mg total) by mouth every 8 (eight) hours as needed (nausea). 30 tablet 1    predniSONE (DELTASONE) 20 MG tablet Take daily by mouth daily: 60mg 6/4-6/10, 50mg (2.5 tab) 6/11-6/17, 40mg (2 tab) 6/18-6/24, 30mg (1.5 tab) 6/25-7/1, 20 mg 7/2/18 75 tablet 11    sevelamer carbonate (RENVELA) 800 mg Tab Take 2 tablets (1,600 mg total) by mouth 3 (three) times daily with meals. 180 tablet 11    sirolimus (RAPAMUNE) 1 MG Tab Take 1 tablet (1 mg total) by mouth once daily. 30 tablet 11    sodium bicarbonate 650 MG tablet Take 1 tablet (650 mg total) by mouth 2 (two) times daily. 60 tablet 11    torsemide (DEMADEX) 20 MG Tab Take 3 tablets (60 mg total) by mouth once daily. 90 tablet 2    ursodiol (ACTIGALL) 300 mg capsule Take 1 capsule (300 mg total) by mouth 2 (two) times daily. 60 capsule 11    venlafaxine (EFFEXOR-XR) 37.5 MG 24 hr capsule Take 1 capsule (37.5 mg total) by mouth once daily. 30 capsule 11    [DISCONTINUED] traZODone (DESYREL) 50 MG tablet Take ½ tablet (25 mg total)  "by mouth every evening. 15 tablet 11     No current facility-administered medications on file prior to encounter.      Allergies:  Bactrim [sulfamethoxazole-trimethoprim]    Psychiatric Review Of Systems - Is patient experiencing or having changes in:  sleep: yes, decreased  appetite: yes, increased.   weight: yes, States that he had lost a lot of weight following liver transplant and is starting to gain some of it back  energy/anergy: no  interest/pleasure/anhedonia: no  somatic symptoms: no  libido: no  anxiety/panic: yes  guilty/hopelessness: no  concentration: no  S.I.B.s/risky behavior: no  Irritability: no  Racing thoughts: no  Impulsive behaviors: no  Paranoia: no  AVH: no    Medical ROS:  See H&P dated 6/29/18 for ROS.     OBJECTIVE:     Vital Signs (Most Recent):  Vitals:    07/03/18 1059   BP: 131/77   Pulse: 106   Resp: 16       Mental Status Exam:  Appearance: age appropriate, casually dressed  Behavior/Cooperation: cooperative  Speech: normal tone, normal rate, normal pitch, normal volume  Mood: "good,calm"  Affect: appropriate  Thought Process: logical  Thought Content: normal, no suicidality, no homicidality, delusions, or paranoia  Orientation: grossly intact  Memory: Grossly intact  Attention Span/Concentration: Normal  Cognition: grossly intact  Insight: fair  Judgment: fair    Laboratory:  Recent Results (from the past 168 hour(s))   CBC auto differential    Collection Time: 06/27/18  1:44 PM   Result Value Ref Range    WBC 4.02 3.90 - 12.70 K/uL    RBC 3.03 (L) 4.60 - 6.20 M/uL    Hemoglobin 9.8 (L) 14.0 - 18.0 g/dL    Hematocrit 30.4 (L) 40.0 - 54.0 %     (H) 82 - 98 fL    MCH 32.3 (H) 27.0 - 31.0 pg    MCHC 32.2 32.0 - 36.0 g/dL    RDW 15.1 (H) 11.5 - 14.5 %    Platelets 22 (LL) 150 - 350 K/uL    MPV 12.2 9.2 - 12.9 fL    Immature Granulocytes 2.7 (H) 0.0 - 0.5 %    Gran # (ANC) 2.9 1.8 - 7.7 K/uL    Immature Grans (Abs) 0.11 (H) 0.00 - 0.04 K/uL    Lymph # 0.6 (L) 1.0 - 4.8 K/uL    Mono " # 0.4 0.3 - 1.0 K/uL    Eos # 0.0 0.0 - 0.5 K/uL    Baso # 0.01 0.00 - 0.20 K/uL    nRBC 0 0 /100 WBC    Gran% 72.7 38.0 - 73.0 %    Lymph% 13.7 (L) 18.0 - 48.0 %    Mono% 10.2 4.0 - 15.0 %    Eosinophil% 0.5 0.0 - 8.0 %    Basophil% 0.2 0.0 - 1.9 %    Platelet Estimate Decreased (A)     Aniso Slight     Poly Occasional     Hypo Occasional     Differential Method Automated    Toxicology screen, urine    Collection Time: 06/29/18  8:46 AM   Result Value Ref Range    Alcohol, Urine <10 <10 mg/dL    Benzodiazepines Negative     Methadone metabolites Negative     Cocaine (Metab.) Negative     Opiate Scrn, Ur Negative     Barbiturate Screen, Ur Negative     Amphetamine Screen, Ur Negative     THC Negative     Phencyclidine Negative     Creatinine, Random Ur 25.0 23.0 - 375.0 mg/dL    Toxicology Information SEE COMMENT    POCT BREATH ALCOHOL TEST    Collection Time: 06/29/18  8:46 AM   Result Value Ref Range    Breath Alcohol 0.000    Type & Screen    Collection Time: 06/30/18  9:31 AM   Result Value Ref Range    Group & Rh O POS     Indirect Claudia NEG    Prepare Platelets in mLs:    Collection Time: 06/30/18  9:31 AM   Result Value Ref Range    UNIT NUMBER [id number]     PRODUCT CODE G8207D11     DISPENSE STATUS TRANSFUSED     CODING SYSTEM CLPN283     Unit Blood Type Code 5100     Unit Blood Type O POS     Unit Expiration [id number]    Bilirubin, direct    Collection Time: 07/02/18  7:54 AM   Result Value Ref Range    Bilirubin, Direct 0.5 (H) 0.1 - 0.3 mg/dL   Sirolimus level    Collection Time: 07/02/18  7:54 AM   Result Value Ref Range    Sirolimus Lvl 5.6 4.0 - 20.0 ng/mL   POTASSIUM    Collection Time: 07/02/18  7:54 AM   Result Value Ref Range    Potassium 4.0 3.5 - 5.1 mmol/L   CBC auto differential    Collection Time: 07/02/18  7:54 AM   Result Value Ref Range    WBC 4.33 3.90 - 12.70 K/uL    RBC 3.28 (L) 4.60 - 6.20 M/uL    Hemoglobin 10.6 (L) 14.0 - 18.0 g/dL    Hematocrit 32.8 (L) 40.0 - 54.0 %    MCV  100 (H) 82 - 98 fL    MCH 32.3 (H) 27.0 - 31.0 pg    MCHC 32.3 32.0 - 36.0 g/dL    RDW 15.0 (H) 11.5 - 14.5 %    Platelets 25 (LL) 150 - 350 K/uL    MPV 11.6 9.2 - 12.9 fL    Immature Granulocytes 3.5 (H) 0.0 - 0.5 %    Gran # (ANC) 2.6 1.8 - 7.7 K/uL    Immature Grans (Abs) 0.15 (H) 0.00 - 0.04 K/uL    Lymph # 0.9 (L) 1.0 - 4.8 K/uL    Mono # 0.6 0.3 - 1.0 K/uL    Eos # 0.0 0.0 - 0.5 K/uL    Baso # 0.01 0.00 - 0.20 K/uL    nRBC 0 0 /100 WBC    Gran% 60.9 38.0 - 73.0 %    Lymph% 21.7 18.0 - 48.0 %    Mono% 13.2 4.0 - 15.0 %    Eosinophil% 0.5 0.0 - 8.0 %    Basophil% 0.2 0.0 - 1.9 %    Platelet Estimate Decreased (A)     Aniso Slight     Poly Occasional     Hypo Occasional     Differential Method Automated    Protime-INR    Collection Time: 07/02/18  7:54 AM   Result Value Ref Range    Prothrombin Time 9.6 9.0 - 12.5 sec    INR 0.9 0.8 - 1.2   Comprehensive metabolic panel    Collection Time: 07/02/18  7:54 AM   Result Value Ref Range    Sodium 140 136 - 145 mmol/L    Potassium 4.0 3.5 - 5.1 mmol/L    Chloride 103 95 - 110 mmol/L    CO2 24 23 - 29 mmol/L    Glucose 98 70 - 110 mg/dL    BUN, Bld 81 (H) 6 - 20 mg/dL    Creatinine 2.7 (H) 0.5 - 1.4 mg/dL    Calcium 9.5 8.7 - 10.5 mg/dL    Total Protein 6.7 6.0 - 8.4 g/dL    Albumin 3.0 (L) 3.5 - 5.2 g/dL    Total Bilirubin 0.7 0.1 - 1.0 mg/dL    Alkaline Phosphatase 179 (H) 55 - 135 U/L    AST 27 10 - 40 U/L    ALT 31 10 - 44 U/L    Anion Gap 13 8 - 16 mmol/L    eGFR if African American 35.5 (A) >60 mL/min/1.73 m^2    eGFR if non  30.7 (A) >60 mL/min/1.73 m^2   POCT BREATH ALCOHOL TEST    Collection Time: 07/03/18 10:59 AM   Result Value Ref Range    Breath Alcohol 0.000      ASSESSMENT:     Alcohol use disorder, severe, in early remission  Opioid use disorder, mild  Unspecified depression (MDD vs. Adjustment disorder)    PLAN:     · Continue patient on ABU protocol.  · Breathalyzer and urine toxicology daily.  · VS daily x3 days.  · Patient counseled  on abstinence from alcohol, opiates (other than Suboxone), and all other drugs of abuse.    Medications: Continue current medications. The risks and benefits of medication were discussed with the patient.  · Continue Suboxone 4 mg qAM and 2 mg QHS for opiate use disorder and pain  · Continue Effexor 37.5 mg daily for mood and anxiety. Consider discontinuing if patient is tolerating Remeron well  · Discontinue Trazodone  · Start Remeron 7.5 mg QHS for sleep, mood, and anxiety      Status: Continue treatment on ABU    Patient's Intervention Response: accepting    Case discussed with Blank Umana MD.    Rajesh Langston MD  [facility]  PGY-4

## 2018-07-03 NOTE — PROGRESS NOTES
Group Psychotherapy (PhD/LCSW)    Site: [facility]    Clinical status of patient: Intensive Outpatient Program (IOP)    Date: 7/3/2018    Group Focus: Psychodynamic Group Psychotherapy    Length of service: 48898 - 45-50 minutes    Number of patients in attendance: 11    Referred by: Addictive Behavior Unit Treatment Team    Target symptoms: Alcohol Abuse    Patient's response to treatment: Active Listening and Self-disclosure    Progress toward goals: Progressing adequately    Interval History: Discussed how another patient has affected him.    Diagnosis: alcohol use disorder, severe, in early remission    Plan: Continue treatment on ABU

## 2018-07-03 NOTE — PLAN OF CARE
07/03/18 1000   Activity/Group Therapy Checklist   Group Goals/Reflection  (life story )   Attendance Attended   Follows Direction Followed directions   Group Interactions/Observations Interacted appropriately   Affect/Mood Range Normal range   Affect/Mood Display Appropriate

## 2018-07-05 ENCOUNTER — HOSPITAL ENCOUNTER (OUTPATIENT)
Dept: PSYCHIATRY | Facility: HOSPITAL | Age: 28
Discharge: HOME | End: 2018-07-05
Attending: PSYCHIATRY & NEUROLOGY
Payer: COMMERCIAL

## 2018-07-05 ENCOUNTER — TELEPHONE (OUTPATIENT)
Dept: TRANSPLANT | Facility: CLINIC | Age: 28
End: 2018-07-05

## 2018-07-05 DIAGNOSIS — R45.89 ANXIETY ABOUT HEALTH: ICD-10-CM

## 2018-07-05 DIAGNOSIS — F10.21 ALCOHOL USE DISORDER, SEVERE, IN EARLY REMISSION: Primary | ICD-10-CM

## 2018-07-05 LAB — BREATH ALCOHOL: 0

## 2018-07-05 PROCEDURE — 90853 GROUP PSYCHOTHERAPY: CPT | Mod: NTX,,, | Performed by: PSYCHOLOGIST

## 2018-07-05 PROCEDURE — 90853 GROUP PSYCHOTHERAPY: CPT | Mod: NTX | Performed by: SOCIAL WORKER

## 2018-07-05 PROCEDURE — 90853 GROUP PSYCHOTHERAPY: CPT | Mod: NTX

## 2018-07-05 NOTE — PLAN OF CARE
07/05/18 1000   Activity/Group Therapy Checklist   Group Goals/Reflection  (miracle question    )   Attendance Attended   Follows Direction Followed directions   Group Interactions/Observations Interacted appropriately   Affect/Mood Range Normal range   Affect/Mood Display Appropriate   Goal Progression Progressing

## 2018-07-05 NOTE — PROGRESS NOTES
Group Psychotherapy (PhD/LCSW)    Site: [facility]    Clinical status of patient: Intensive Outpatient Program (IOP)    Date: 7/5/2018    Group Focus: Psychodynamic Group Psychotherapy    Length of service: 45085 - 45-50 minutes    Number of patients in attendance: 9    Referred by: Addictive Behavior Unit Treatment Team    Target symptoms: Alcohol Abuse    Patient's response to treatment: Active Listening and Self-disclosure    Progress toward goals: Progressing adequately    Interval History: Discussed feeling better now that meds are adjusted. Sometimes has urge to drink after AA.    Diagnosis: alcohol use disorder, severe, in early remission    Plan: Continue treatment on ABU

## 2018-07-05 NOTE — TELEPHONE ENCOUNTER
----- Message from Teresita Felix LMSW sent at 7/5/2018  3:27 PM CDT -----  Contact: Pt  Robert Calvo,     I believe this goes to you!     Thanks,  Teresita   ----- Message -----  From: Yara Law  Sent: 7/5/2018   1:19 PM  To: [facility]    Lubna Agustin, Pt requesting refill for Bisacodyl 5mg.  Please call [phone].

## 2018-07-05 NOTE — PROGRESS NOTES
Group Psychotherapy (PhD/LCSW)    Site: [facility]    Clinical status of patient: Intensive Outpatient Program (IOP)    Date: 7/5/2018    Group Focus: Stress Management    Length of service: 25909 - 45-50 minutes    Number of patients in attendance: 13    Referred by: Addictive Behavior Unit Treatment Team    Target symptoms: Alcohol Abuse    Patient's response to treatment: Active Listening and Self-disclosure    Progress toward goals: Progressing adequately    Interval History: Discussed the stress response. Discussed numerous ways to decrease stress.     Diagnosis: alcohol use disorder, severe, in early remission    Plan: Continue treatment on ABU

## 2018-07-06 ENCOUNTER — HOSPITAL ENCOUNTER (OUTPATIENT)
Dept: PSYCHIATRY | Facility: HOSPITAL | Age: 28
Discharge: HOME | End: 2018-07-06
Attending: PSYCHIATRY & NEUROLOGY
Payer: COMMERCIAL

## 2018-07-06 ENCOUNTER — TELEPHONE (OUTPATIENT)
Dept: TRANSPLANT | Facility: CLINIC | Age: 28
End: 2018-07-06

## 2018-07-06 VITALS — RESPIRATION RATE: 16 BRPM | HEART RATE: 95 BPM | SYSTOLIC BLOOD PRESSURE: 120 MMHG | DIASTOLIC BLOOD PRESSURE: 74 MMHG

## 2018-07-06 DIAGNOSIS — F10.21 ALCOHOL USE DISORDER, SEVERE, IN EARLY REMISSION: ICD-10-CM

## 2018-07-06 DIAGNOSIS — Z94.4 LIVER TRANSPLANTED: Primary | ICD-10-CM

## 2018-07-06 DIAGNOSIS — R45.89 ANXIETY ABOUT HEALTH: ICD-10-CM

## 2018-07-06 DIAGNOSIS — K59.00 CONSTIPATION, UNSPECIFIED CONSTIPATION TYPE: ICD-10-CM

## 2018-07-06 LAB
AMPHET+METHAMPHET UR QL: NEGATIVE
BARBITURATES UR QL SCN>200 NG/ML: NEGATIVE
BENZODIAZ UR QL SCN>200 NG/ML: NEGATIVE
BREATH ALCOHOL: 0
BZE UR QL SCN: NEGATIVE
CANNABINOIDS UR QL SCN: NEGATIVE
CREAT UR-MCNC: 19 MG/DL
ETHANOL UR-MCNC: <10 MG/DL
METHADONE UR QL SCN>300 NG/ML: NEGATIVE
OPIATES UR QL SCN: NEGATIVE
PCP UR QL SCN>25 NG/ML: NEGATIVE
TOXICOLOGY INFORMATION: ABNORMAL

## 2018-07-06 PROCEDURE — 80307 DRUG TEST PRSMV CHEM ANLYZR: CPT | Mod: NTX

## 2018-07-06 PROCEDURE — 90853 GROUP PSYCHOTHERAPY: CPT | Mod: NTX | Performed by: SOCIAL WORKER

## 2018-07-06 PROCEDURE — G0177 OPPS/PHP; TRAIN & EDUC SERV: HCPCS | Mod: TXP

## 2018-07-06 PROCEDURE — 99232 SBSQ HOSP IP/OBS MODERATE 35: CPT | Mod: ,,, | Performed by: PSYCHIATRY & NEUROLOGY

## 2018-07-06 PROCEDURE — 90832 PSYTX W PT 30 MINUTES: CPT | Mod: NTX

## 2018-07-06 PROCEDURE — 90853 GROUP PSYCHOTHERAPY: CPT | Performed by: SOCIAL WORKER

## 2018-07-06 RX ORDER — BISACODYL 5 MG
10 TABLET, DELAYED RELEASE (ENTERIC COATED) ORAL DAILY PRN
Qty: 60 TABLET | Refills: 5 | Status: SHIPPED | OUTPATIENT
Start: 2018-07-06 | End: 2019-04-12

## 2018-07-06 NOTE — PROGRESS NOTES
Pt is still to ill to bring in for kidney transplant evaluation. Will continue to evaluate when stable to begin evaluation.

## 2018-07-06 NOTE — PLAN OF CARE
07/06/18 1400   Activity/Group Therapy Checklist   Group Relapse Prevention  (Step Work )   Attendance Attended   Follows Direction Followed directions   Group Interactions/Observations Interacted appropriately   Affect/Mood Range Normal range   Affect/Mood Display Appropriate   Goal Progression Progressing

## 2018-07-06 NOTE — TELEPHONE ENCOUNTER
----- Message from Georgina Rogers MD sent at 7/6/2018  1:19 PM CDT -----  Regarding: RE: next follow up visit  Mid-end of July.    ----- Message -----  From: Lubna Agustin RN  Sent: 7/6/2018   1:04 PM  To: Georgina Rogers MD  Subject: next follow up visit                             Randee Rogers.  When would you like to to see pt back in clinic? You saw him on 6/19/18.    Thanks,  Lubna
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
## 2018-07-06 NOTE — TELEPHONE ENCOUNTER
----- Message from Georgina Rogers MD sent at 7/2/2018 12:24 PM CDT -----  Labs stable, no action needed.

## 2018-07-06 NOTE — PROGRESS NOTES
2018 5:32 PM  Name: Jhonny Diana  : 1990  Start Date: 18    [facility] Intensive Outpatient Program   Progress Note    Status: Intensive Outpatient Program (IOP)    HPI:  Jhonny Diana is a 27 y.o. male who presents for admission to [facility] with principal problem of opioid use, as well as hx of alcohol use disorder.      Pt is s/p liver transplant in 10/2017 due to acute alcoholic hepatitis. He has a hx of heavy alcohol use since his teens. He required emergent liver transplantation prior to being able to undergo any type of substance abuse treatment. It was recommended that pt attend the ABU program after he received his liver transplant as he was deemed to be very high risk for relapse post-transplant. However pt never followed up in the ABU due to a number of post-op medical complications including ESRD requiring HD (Mon/Fri). In 2018 the addiction psychiatry team was consulted while pt was admitted to the hospital due to concerns for opioid addiction. Pt was started on Suboxone and again recommended to f/u with the [facility]BU for substance abuse treatment.      Today pt reports that he is feeling well physically. He confirms hx of alcohol use since the age of 18. He escalated to drinking a fifth of liquor daily in his early to mid twenties. He attempted to cut back a number of times but was unsuccessful. He has attended AA meetings in the past but has never been to a formal rehab program. He minimizes the effects that alcohol had in his life as he states that he mostly drank by himself, so it did not affect his relationships. He does admit that alcohol was a problem for him however, and recognizes the need for continued life long sobriety following his liver transplant.      Regarding his opiate use, pt reports using 10 mg of oxycontin every 4 hours as was prescribed to him. He reports that he began getting prescriptions for opiates a few months prior to his transplant, and continued to use them until this  "month when he was transitioned to Suboxone. He denies ever buying opiates or using meds which were not prescribed to him. He denies ever injecting or snorting them. He does report feeling better both physically and mentally on Suboxone. He reports feeling like his head is clearer and pain is better controlled. He denies any current side effects from Suboxone or cravings to use other opiates.      Last drink/use: Last use of alcohol 9/2017. Last use of narcotic pain medications (other than Suboxone) 6/14/18  Average consumption: drank a fifth of liquor daily. Used Oxycontin 10 mg PO q4h  Withdrawal: yes, hx of tremors and anxiety from alcohol withdrawal. No hx of complicated alcohol withdrawal or opiate withdrawal  Spouse/partner consumption: n/a  Rehab/Detox: no  AA/NA: yes, has been to AA before. Went for approx 1 year prior to his transplant. States "not a fan" due to Yarsani aspect    SUBJECTIVE:     Interval Hx:  On my interview this morning, patient reported everything is "all good." He has taken remeron in the past and feels it works well. He takes it as needed for insomnia. Patient still wakes up 2-3 times per night 2/2 pain. Feels refreshed in the morning, unlike trazodone which made patient drowsy. Attended a BBQ over 4th of July and an AA meeting on the 4th and 5th of July. Received a 9 month sober chip, which is the longest period of sobriety for patient. Protecttive relapse factor is "the whole if I drink I will die thing." States drinking is not an option. Notes family is a support system. Patient receives dialysis M and F. 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 Patient has been taking Effexor for "a while," has not needed a refill yet. He is ambivalent about medication's efficacy.    Medication Side Effects: None  Cravings: no  No other acute psychiatric issues reported at this time.    Scheduled Meds: "   Current Outpatient Prescriptions on File Prior to Encounter   Medication Sig Dispense Refill    amLODIPine (NORVASC) 5 MG tablet Take 1 tablet (5 mg total) by mouth once daily. 30 tablet 11    buprenorphine-naloxone 2-0.5 mg (SUBOXONE) 2-0.5 mg Film Take 2 film sublingually in the morning and 1 film sublingually in the evening 42 each 0    ciprofloxacin HCl (CIPRO) 250 MG tablet Take 1 tablet (250 mg total) by mouth every 12 (twelve) hours. 10 tablet 0    dronabinol (MARINOL) 2.5 MG capsule Take 1 capsule (2.5 mg total) by mouth once daily. 30 capsule 0    eltrombopag (PROMACTA) 50 MG Tab Take 1 tablet (50 mg total) by mouth once daily. 30 tablet 11    famotidine (PEPCID) 20 MG tablet Take 1 tablet (20 mg total) by mouth every evening. 30 tablet 11    folic acid (FOLVITE) 1 MG tablet Take 1 tablet (1 mg total) by mouth once daily. 30 tablet 2    levETIRAcetam (KEPPRA) 500 MG Tab Take 1 tablet (500 mg total) by mouth 2 (two) times daily. 60 tablet 11    levothyroxine (SYNTHROID) 75 MCG tablet Take 1 tablet (75 mcg total) by mouth before breakfast. 30 tablet 11    mirtazapine (REMERON) 7.5 MG Tab Take 1 tablet (7.5 mg total) by mouth every evening. 30 tablet 2    ondansetron (ZOFRAN-ODT) 8 MG TbDL Take 1 tablet (8 mg total) by mouth every 8 (eight) hours as needed (nausea). 30 tablet 1    predniSONE (DELTASONE) 20 MG tablet Take daily by mouth daily: 60mg 6/4-6/10, 50mg (2.5 tab) 6/11-6/17, 40mg (2 tab) 6/18-6/24, 30mg (1.5 tab) 6/25-7/1, 20 mg 7/2/18 75 tablet 11    sevelamer carbonate (RENVELA) 800 mg Tab Take 2 tablets (1,600 mg total) by mouth 3 (three) times daily with meals. 180 tablet 11    sirolimus (RAPAMUNE) 1 MG Tab Take 1 tablet (1 mg total) by mouth once daily. 30 tablet 11    sodium bicarbonate 650 MG tablet Take 1 tablet (650 mg total) by mouth 2 (two) times daily. 60 tablet 11    torsemide (DEMADEX) 20 MG Tab Take 3 tablets (60 mg total) by mouth once daily. 90 tablet 2    ursodiol  "(ACTIGALL) 300 mg capsule Take 1 capsule (300 mg total) by mouth 2 (two) times daily. 60 capsule 11    venlafaxine (EFFEXOR-XR) 37.5 MG 24 hr capsule Take 1 capsule (37.5 mg total) by mouth once daily. 30 capsule 11     No current facility-administered medications on file prior to encounter.      Allergies:  Bactrim [sulfamethoxazole-trimethoprim]    Medical ROS:  See H&P dated 6/29/18 for ROS.     OBJECTIVE:     Vital Signs (Most Recent):  There were no vitals filed for this visit.    Mental Status Exam:  Appearance: age appropriate, casually dressed  Behavior/Cooperation: cooperative  Speech: normal tone, normal rate, normal pitch, normal volume  Mood: "all good"  Affect: Mood congruent  Thought Process: logical  Thought Content: normal, no suicidality, no homicidality, delusions, or paranoia  Orientation: grossly intact  Memory: Grossly intact  Attention Span/Concentration: Normal  Cognition: grossly intact  Insight: fair  Judgment: fair    Laboratory:  Recent Results (from the past 168 hour(s))   Type & Screen    Collection Time: 06/30/18  9:31 AM   Result Value Ref Range    Group & Rh O POS     Indirect Claudia NEG    Prepare Platelets in mLs:    Collection Time: 06/30/18  9:31 AM   Result Value Ref Range    UNIT NUMBER [id number]     PRODUCT CODE K8053Y66     DISPENSE STATUS TRANSFUSED     CODING SYSTEM ULXS828     Unit Blood Type Code 5100     Unit Blood Type O POS     Unit Expiration [id number]    Bilirubin, direct    Collection Time: 07/02/18  7:54 AM   Result Value Ref Range    Bilirubin, Direct 0.5 (H) 0.1 - 0.3 mg/dL   Sirolimus level    Collection Time: 07/02/18  7:54 AM   Result Value Ref Range    Sirolimus Lvl 5.6 4.0 - 20.0 ng/mL   POTASSIUM    Collection Time: 07/02/18  7:54 AM   Result Value Ref Range    Potassium 4.0 3.5 - 5.1 mmol/L   CBC auto differential    Collection Time: 07/02/18  7:54 AM   Result Value Ref Range    WBC 4.33 3.90 - 12.70 K/uL    RBC 3.28 (L) 4.60 - 6.20 M/uL    " Hemoglobin 10.6 (L) 14.0 - 18.0 g/dL    Hematocrit 32.8 (L) 40.0 - 54.0 %     (H) 82 - 98 fL    MCH 32.3 (H) 27.0 - 31.0 pg    MCHC 32.3 32.0 - 36.0 g/dL    RDW 15.0 (H) 11.5 - 14.5 %    Platelets 25 (LL) 150 - 350 K/uL    MPV 11.6 9.2 - 12.9 fL    Immature Granulocytes 3.5 (H) 0.0 - 0.5 %    Gran # (ANC) 2.6 1.8 - 7.7 K/uL    Immature Grans (Abs) 0.15 (H) 0.00 - 0.04 K/uL    Lymph # 0.9 (L) 1.0 - 4.8 K/uL    Mono # 0.6 0.3 - 1.0 K/uL    Eos # 0.0 0.0 - 0.5 K/uL    Baso # 0.01 0.00 - 0.20 K/uL    nRBC 0 0 /100 WBC    Gran% 60.9 38.0 - 73.0 %    Lymph% 21.7 18.0 - 48.0 %    Mono% 13.2 4.0 - 15.0 %    Eosinophil% 0.5 0.0 - 8.0 %    Basophil% 0.2 0.0 - 1.9 %    Platelet Estimate Decreased (A)     Aniso Slight     Poly Occasional     Hypo Occasional     Differential Method Automated    Protime-INR    Collection Time: 07/02/18  7:54 AM   Result Value Ref Range    Prothrombin Time 9.6 9.0 - 12.5 sec    INR 0.9 0.8 - 1.2   Comprehensive metabolic panel    Collection Time: 07/02/18  7:54 AM   Result Value Ref Range    Sodium 140 136 - 145 mmol/L    Potassium 4.0 3.5 - 5.1 mmol/L    Chloride 103 95 - 110 mmol/L    CO2 24 23 - 29 mmol/L    Glucose 98 70 - 110 mg/dL    BUN, Bld 81 (H) 6 - 20 mg/dL    Creatinine 2.7 (H) 0.5 - 1.4 mg/dL    Calcium 9.5 8.7 - 10.5 mg/dL    Total Protein 6.7 6.0 - 8.4 g/dL    Albumin 3.0 (L) 3.5 - 5.2 g/dL    Total Bilirubin 0.7 0.1 - 1.0 mg/dL    Alkaline Phosphatase 179 (H) 55 - 135 U/L    AST 27 10 - 40 U/L    ALT 31 10 - 44 U/L    Anion Gap 13 8 - 16 mmol/L    eGFR if African American 35.5 (A) >60 mL/min/1.73 m^2    eGFR if non  30.7 (A) >60 mL/min/1.73 m^2   POCT BREATH ALCOHOL TEST    Collection Time: 07/03/18 10:59 AM   Result Value Ref Range    Breath Alcohol 0.000    POCT BREATH ALCOHOL TEST    Collection Time: 07/05/18 11:20 AM   Result Value Ref Range    Breath Alcohol 0.000      ASSESSMENT:     Alcohol use disorder, severe, in early remission  Opioid use disorder,  mild  Unspecified depression (MDD vs. Adjustment disorder)    PLAN:     · Continue patient on ABU protocol.  · Breathalyzer and urine toxicology daily.  · VS daily x3 days.  · Patient counseled on abstinence from alcohol, opiates (other than Suboxone), and all other drugs of abuse.    Medications: Continue current medications. The risks and benefits of medication were discussed with the patient.  · Continue Suboxone 4 mg qAM and 2 mg QHS for opiate use disorder and pain  · Discontinue Effexor 37.5 mg daily for mood and anxiety to avoid polypharmacy.   · Continue Remeron 7.5 mg QHS for sleep, mood, and anxiety      Status: Continue treatment on ABU    Patient's Intervention Response: accepting    Case discussed with Blank Umana MD.    Singh Hall DO  [facility]   PGY-2

## 2018-07-06 NOTE — TELEPHONE ENCOUNTER
Labs stable. Next labs will be due on 7/9/18. Msg sent ot pt via my [employer].

## 2018-07-06 NOTE — PLAN OF CARE
07/06/18 1000   Activity/Group Therapy Checklist   Group Goals/Reflection  (process )   Attendance Attended   Follows Direction Followed directions   Group Interactions/Observations Interacted appropriately   Affect/Mood Range Normal range   Affect/Mood Display Appropriate   Goal Progression Progressing

## 2018-07-09 ENCOUNTER — LAB VISIT (OUTPATIENT)
Dept: LAB | Facility: HOSPITAL | Age: 28
End: 2018-07-09
Attending: INTERNAL MEDICINE
Payer: COMMERCIAL

## 2018-07-09 ENCOUNTER — TELEPHONE (OUTPATIENT)
Dept: TRANSPLANT | Facility: CLINIC | Age: 28
End: 2018-07-09

## 2018-07-09 ENCOUNTER — DOCUMENTATION ONLY (OUTPATIENT)
Dept: TRANSPLANT | Facility: CLINIC | Age: 28
End: 2018-07-09

## 2018-07-09 ENCOUNTER — HOSPITAL ENCOUNTER (OUTPATIENT)
Dept: PSYCHIATRY | Facility: HOSPITAL | Age: 28
Discharge: HOME | End: 2018-07-09
Attending: PSYCHIATRY & NEUROLOGY
Payer: COMMERCIAL

## 2018-07-09 VITALS — HEART RATE: 103 BPM | SYSTOLIC BLOOD PRESSURE: 134 MMHG | RESPIRATION RATE: 16 BRPM | DIASTOLIC BLOOD PRESSURE: 79 MMHG

## 2018-07-09 DIAGNOSIS — R45.89 ANXIETY ABOUT HEALTH: ICD-10-CM

## 2018-07-09 DIAGNOSIS — Z94.4 LIVER TRANSPLANTED: ICD-10-CM

## 2018-07-09 DIAGNOSIS — F10.21 ALCOHOL USE DISORDER, SEVERE, IN EARLY REMISSION: ICD-10-CM

## 2018-07-09 DIAGNOSIS — Z94.4 LIVER TRANSPLANTED: Primary | ICD-10-CM

## 2018-07-09 LAB
ALBUMIN SERPL BCP-MCNC: 3.1 G/DL
ALP SERPL-CCNC: 173 U/L
ALT SERPL W/O P-5'-P-CCNC: 32 U/L
AMPHET+METHAMPHET UR QL: NEGATIVE
ANION GAP SERPL CALC-SCNC: 11 MMOL/L
AST SERPL-CCNC: 28 U/L
BARBITURATES UR QL SCN>200 NG/ML: NEGATIVE
BASOPHILS # BLD AUTO: 0.03 K/UL
BASOPHILS NFR BLD: 0.5 %
BENZODIAZ UR QL SCN>200 NG/ML: NEGATIVE
BILIRUB DIRECT SERPL-MCNC: 0.4 MG/DL
BILIRUB SERPL-MCNC: 0.6 MG/DL
BREATH ALCOHOL: 0
BUN SERPL-MCNC: 76 MG/DL
BZE UR QL SCN: NEGATIVE
CALCIUM SERPL-MCNC: 9.5 MG/DL
CANNABINOIDS UR QL SCN: NEGATIVE
CHLORIDE SERPL-SCNC: 106 MMOL/L
CO2 SERPL-SCNC: 23 MMOL/L
CREAT SERPL-MCNC: 2.8 MG/DL
CREAT UR-MCNC: 53 MG/DL
DIFFERENTIAL METHOD: ABNORMAL
EOSINOPHIL # BLD AUTO: 0 K/UL
EOSINOPHIL NFR BLD: 0.5 %
ERYTHROCYTE [DISTWIDTH] IN BLOOD BY AUTOMATED COUNT: 14.5 %
EST. GFR  (AFRICAN AMERICAN): 33.9 ML/MIN/1.73 M^2
EST. GFR  (NON AFRICAN AMERICAN): 29.4 ML/MIN/1.73 M^2
ETHANOL UR-MCNC: <10 MG/DL
GLUCOSE SERPL-MCNC: 78 MG/DL
HCT VFR BLD AUTO: 31 %
HGB BLD-MCNC: 9.7 G/DL
IMM GRANULOCYTES # BLD AUTO: 0.1 K/UL
IMM GRANULOCYTES NFR BLD AUTO: 1.7 %
LYMPHOCYTES # BLD AUTO: 1 K/UL
LYMPHOCYTES NFR BLD: 16.3 %
MCH RBC QN AUTO: 31.2 PG
MCHC RBC AUTO-ENTMCNC: 31.3 G/DL
MCV RBC AUTO: 100 FL
METHADONE UR QL SCN>300 NG/ML: NEGATIVE
MONOCYTES # BLD AUTO: 0.7 K/UL
MONOCYTES NFR BLD: 11.1 %
NEUTROPHILS # BLD AUTO: 4.2 K/UL
NEUTROPHILS NFR BLD: 69.9 %
NRBC BLD-RTO: 0 /100 WBC
OPIATES UR QL SCN: NEGATIVE
PCP UR QL SCN>25 NG/ML: NEGATIVE
PLATELET # BLD AUTO: 36 K/UL
PLATELET BLD QL SMEAR: ABNORMAL
PMV BLD AUTO: 11.9 FL
POTASSIUM SERPL-SCNC: 4 MMOL/L
PROT SERPL-MCNC: 6.7 G/DL
RBC # BLD AUTO: 3.11 M/UL
SIROLIMUS BLD-MCNC: 2.6 NG/ML
SODIUM SERPL-SCNC: 140 MMOL/L
TOXICOLOGY INFORMATION: NORMAL
WBC # BLD AUTO: 5.96 K/UL

## 2018-07-09 PROCEDURE — 36415 COLL VENOUS BLD VENIPUNCTURE: CPT

## 2018-07-09 PROCEDURE — 80307 DRUG TEST PRSMV CHEM ANLYZR: CPT | Mod: NTX

## 2018-07-09 PROCEDURE — 90853 GROUP PSYCHOTHERAPY: CPT | Mod: NTX

## 2018-07-09 PROCEDURE — 80053 COMPREHEN METABOLIC PANEL: CPT

## 2018-07-09 PROCEDURE — 82248 BILIRUBIN DIRECT: CPT

## 2018-07-09 PROCEDURE — 90853 GROUP PSYCHOTHERAPY: CPT | Performed by: SOCIAL WORKER

## 2018-07-09 PROCEDURE — 80195 ASSAY OF SIROLIMUS: CPT

## 2018-07-09 PROCEDURE — 85025 COMPLETE CBC W/AUTO DIFF WBC: CPT

## 2018-07-09 RX ORDER — SIROLIMUS 0.5 MG/1
0.5 TABLET, FILM COATED ORAL DAILY
Qty: 30 TABLET | Refills: 11 | Status: SHIPPED | OUTPATIENT
Start: 2018-07-09 | End: 2018-08-14 | Stop reason: ALTCHOICE

## 2018-07-09 NOTE — TELEPHONE ENCOUNTER
----- Message from Georgina Rogers MD sent at 7/9/2018 12:26 PM CDT -----  Sirolimus: 2.6. Liver tests: stable. Please increase sirolimus dose to 1.5 mg daily. Please repeat labs next week. Thank you.

## 2018-07-09 NOTE — PLAN OF CARE
07/09/18 1000   Activity/Group Therapy Checklist   Group Goals/Reflection  (process )   Attendance Attended   Follows Direction Followed directions   Group Interactions/Observations Interacted appropriately   Affect/Mood Range Normal range   Affect/Mood Display Appropriate   Goal Progression Progressing

## 2018-07-09 NOTE — PLAN OF CARE
07/09/18 1100   Activity/Group Therapy Checklist   Group Educational   Attendance Attended   Follows Direction Followed directions   Group Interactions/Observations Interacted appropriately;Sharing   Affect/Mood Range Normal range   Affect/Mood Display Appropriate   Goal Progression Progressing

## 2018-07-09 NOTE — TREATMENT PLAN
[facility]  ADDICTIVE BEHAVIOR UNIT  INTERDISCIPLINARY TREATMENT PLAN  INTENSIVE OUTPATIENT PROGRAM    INTERDISCIPLINARY  TREATMENT TEAM:    Blank Umana M.D., Psychiatrist     Twyla Deleon, Ph.D., Clinical Psychologist    Lorie Garcia R.N., Registered Nurse    Nino Villarreal, [facility],     Massiel Pollock, [facility],     Vasquez Guillen, [facility] [place],     Resident: Dr. Flaherty       Signatures scanned into record separately.      ESTIMATED LOS:  4-6 weeks        The patient has reviewed the treatment plan with staff and has signed the Patient Responsibilities form.  Patient signature scanned into record separately        Dr. Braydon Novak certifies that the patient would require inpatient psychiatric care if the Partial Hospitalization services were not provided, and services will be furnished under the care of a physician, and under a written Plan of Treatment.    Braydon Novak M.D., Psychiatrist - Signature scanned into record separately.    TREATMENT PLAN    DIAGNOSIS: Alcohol Use Disorder, severe, in early remission; Opioid Use Disorder, mild; Unspecified Depression                     Patient/Family Education Needs/Barriers to Learning (i.e., Language, Reading, Comprehension): None       Support/Advocacy Services/Needs (i.e., Financial, Transportation, Medications): None       Community Resources (i.e., Alcoholics Anonymous, Al Anon, Cocaine Anonymous, Narcotics Anonymous): None           Strengths:  1. Kind/generous   2. Creative   3. Intelligent   4. Congenial         Limitations:  1. Coping with frustration    2. Breaking old habits        3. Poor coping skills   4. Risk for relapse           Goals and Objectives:  1. Goal:  Abstain from alcohol and illicit drugs   Objective measure: Negative breathalyzer, negative urine screens   Time frame to reach goal: By discharge    2.    Goal: Attend daily 12-step meetings   Objective measure: Signed attendance sheet  daily   Time frame to reach goal: Each day    3. Goal: Participate in group sessions    Objective measure: Progress notes indicating active listening, self-disclosure,   feedback   Time frame to reach goal: Each day    4. Goal: Obtain a 12-step sponsor   Objective measure: self-report   Time frame to reach goal: By discharge    5. Goal: Complete Life Story   Objective measure: Share story with group   Time frame to reach goal: Within first two weeks of treatment    6. Goal: Complete First Step   Objective measure: Share 1st step with group   Time frame to reach goal:  By discharge    7. Goal: Complete Relapse Prevention Plan   Objective measure: Share plan with group   Time frame to reach goal: By discharge    8.  Goal: Family involvement/participation   Objective measure: Family session documented in progress notes   Time frame to reach goal: By discharge    9. Goal:  Reduce depression   Objective measure: Physician progress note indicating depression is improved   Time frame to reach goal: By discharge    10. Goal: Pain management   Objective measure: Physician note addressing how to manage  pain   Time frame to reach goal: By discharge    11.  Goal: Address Health Problems   Objective measure: Physicians note regarding management of health problems   Time frame to reach goal: Within first week of treatment                         Group Interventions:  Psychodynamic Group Psychotherapy  1 hour, five times per week  Goals: 1. Utilize group empathy and support for problem solving; 2. Apply stress management, communication, and assertive skills to personal issues; 3. Discuss negative consequences of addictive behavior; 4. Discuss ways to change lifestyle to support sobriety; 5. Discuss addiction history    Addiction Education Group  1 hour, 2 times per week  Goals:  1. Verbalize increased knowledge of the process of recovery; 2. Understand basic concepts of addiction (denial, powerlessness, unmanageabiltiy, etc.);  3. Develop a consistent, positive image of self    Steps to Recovery Group  1 hour, 1 time per week  Goals:  1. Learn 12 steps; 2. Identify ways to incorporate 12 step principles into daily life; 3. Complete first step; 4. Verbalize knowledge and understanding of the concept of a higher power    Living Sober Group  1 hour, 2 times per week  Goals:  1.  Reflect upon events of day/weekend, focusing on positive change; 2.  Discuss dynamics of 12 step meetings attended; 3. Discuss topics from book Living Sober     Stress Management Skills Group  1 hour, 3 times per week  Goals: 1. Identify types and levels of stress; 2. Identify and change maladaptive beliefs and behaviors; 3. Identify and practice techniques of stress management    Disease Concept Group  1 hour, 1 time per week  Goals: 1. Verbalize an understanding of the disease concept of addiction; 2. Increase familys understanding of the disease concept of addiction    Communication Skills Group  1 hour, 2 times per week  Goals: 1. Learn rules of effective communication; 2. Improve listening skills; 3. Practice clear communication    Promoting Healthy Lifestyles Group  1 hour, 1 time per week  Goals:  1. Understand the biopsychosocial model of health; 2. Develop insight into how substance abuse/dependency can impact dimensions of health; 3. Develop appropriate health promotion strategies    Relationship Dynamics Group  1 hour, 1 time per week  Goals:  1. Learn about factors that shape relationships; 2. Understand the central role of relationships in personal well-being; 3. Learn how to improve all relationships    Medical Complications Group  1 hour, 1 time per week  Goals:  1.  Increase knowledge of how addiction negatively affects the body; 2. Increase awareness of how abstinence can positively impact health

## 2018-07-09 NOTE — TELEPHONE ENCOUNTER
Left a detailed VM on pt's mother's phone reviewing stable labs and dose increase of Rapa to 1.5mg daily. Asked for a return call or a my [facility] msg to confirm receiving call.

## 2018-07-09 NOTE — PROGRESS NOTES
2018 5:32 PM  Name: Jhonny Diana  : 1990  Start Date: 18    [facility] Intensive Outpatient Program   Progress Note    Status: Intensive Outpatient Program (IOP)    HPI:  Jhonny Diana is a 27 y.o. male who presents for admission to [facility]BU with principal problem of opioid use, as well as hx of alcohol use disorder.      Pt is s/p liver transplant in 10/2017 due to acute alcoholic hepatitis. He has a hx of heavy alcohol use since his teens. He required emergent liver transplantation prior to being able to undergo any type of substance abuse treatment. It was recommended that pt attend the ABU program after he received his liver transplant as he was deemed to be very high risk for relapse post-transplant. However pt never followed up in the ABU due to a number of post-op medical complications including ESRD requiring HD (Mon/Fri). In 2018 the addiction psychiatry team was consulted while pt was admitted to the hospital due to concerns for opioid addiction. Pt was started on Suboxone and again recommended to f/u with the ABU for substance abuse treatment.      Today pt reports that he is feeling well physically. He confirms hx of alcohol use since the age of 18. He escalated to drinking a fifth of liquor daily in his early to mid twenties. He attempted to cut back a number of times but was unsuccessful. He has attended AA meetings in the past but has never been to a formal rehab program. He minimizes the effects that alcohol had in his life as he states that he mostly drank by himself, so it did not affect his relationships. He does admit that alcohol was a problem for him however, and recognizes the need for continued life long sobriety following his liver transplant.      Regarding his opiate use, pt reports using 10 mg of oxycontin every 4 hours as was prescribed to him. He reports that he began getting prescriptions for opiates a few months prior to his transplant, and continued to use them until this  "month when he was transitioned to Suboxone. He denies ever buying opiates or using meds which were not prescribed to him. He denies ever injecting or snorting them. He does report feeling better both physically and mentally on Suboxone. He reports feeling like his head is clearer and pain is better controlled. He denies any current side effects from Suboxone or cravings to use other opiates.      Last drink/use: Last use of alcohol 9/2017. Last use of narcotic pain medications (other than Suboxone) 6/14/18  Average consumption: drank a fifth of liquor daily. Used Oxycontin 10 mg PO q4h  Withdrawal: yes, hx of tremors and anxiety from alcohol withdrawal. No hx of complicated alcohol withdrawal or opiate withdrawal  Spouse/partner consumption: n/a  Rehab/Detox: no  AA/NA: yes, has been to AA before. Went for approx 1 year prior to his transplant. States "not a fan" due to Holiness aspect    SUBJECTIVE:     Interval Hx:  On my interview this morning, patient reported everything is "tired, dialysis and all that."  Reports still waiting on his 24 hour urinalysis results from Nephrology, which will determine kidney function. "We grilled out over the weekend....went to a couple AA weekends." Goes to [facility] at 4:30 PM the majority of the time, as he prefers the people. ikes the people the most as he can "always count on them to say something positive." Patient does not have a sponsor. Patient is taking Mirtazapine prn, last taking it "about a week ago." Patient sleeps 6 hours a night, waking up intermittently throughout the night. He does feel well rested upon waking up. Inquires about his end date for IOP. Notes IOP isn't as boring as he thought it would be. AA meetings are most useful for patient as he enjoys hearing people's story.    Medication Side Effects: None  Cravings: no  No other acute psychiatric issues reported at this time.    Scheduled Meds:   Current Outpatient Prescriptions on File Prior to " Encounter   Medication Sig Dispense Refill    amLODIPine (NORVASC) 5 MG tablet Take 1 tablet (5 mg total) by mouth once daily. 30 tablet 11    bisacodyl (DULCOLAX) 5 mg EC tablet Take 2 tablets (10 mg total) by mouth daily as needed for Constipation. 60 tablet 5    buprenorphine-naloxone 2-0.5 mg (SUBOXONE) 2-0.5 mg Film Take 2 film sublingually in the morning and 1 film sublingually in the evening 42 each 0    ciprofloxacin HCl (CIPRO) 250 MG tablet Take 1 tablet (250 mg total) by mouth every 12 (twelve) hours. 10 tablet 0    dronabinol (MARINOL) 2.5 MG capsule Take 1 capsule (2.5 mg total) by mouth once daily. 30 capsule 0    eltrombopag (PROMACTA) 50 MG Tab Take 1 tablet (50 mg total) by mouth once daily. 30 tablet 11    famotidine (PEPCID) 20 MG tablet Take 1 tablet (20 mg total) by mouth every evening. 30 tablet 11    folic acid (FOLVITE) 1 MG tablet Take 1 tablet (1 mg total) by mouth once daily. 30 tablet 2    levETIRAcetam (KEPPRA) 500 MG Tab Take 1 tablet (500 mg total) by mouth 2 (two) times daily. 60 tablet 11    levothyroxine (SYNTHROID) 75 MCG tablet Take 1 tablet (75 mcg total) by mouth before breakfast. 30 tablet 11    mirtazapine (REMERON) 7.5 MG Tab Take 1 tablet (7.5 mg total) by mouth every evening. 30 tablet 2    ondansetron (ZOFRAN-ODT) 8 MG TbDL Take 1 tablet (8 mg total) by mouth every 8 (eight) hours as needed (nausea). 30 tablet 1    predniSONE (DELTASONE) 20 MG tablet Take daily by mouth daily: 60mg 6/4-6/10, 50mg (2.5 tab) 6/11-6/17, 40mg (2 tab) 6/18-6/24, 30mg (1.5 tab) 6/25-7/1, 20 mg 7/2/18 75 tablet 11    sevelamer carbonate (RENVELA) 800 mg Tab Take 2 tablets (1,600 mg total) by mouth 3 (three) times daily with meals. 180 tablet 11    sirolimus (RAPAMUNE) 1 MG Tab Take 1 tablet (1 mg total) by mouth once daily. 30 tablet 11    sodium bicarbonate 650 MG tablet Take 1 tablet (650 mg total) by mouth 2 (two) times daily. 60 tablet 11    torsemide (DEMADEX) 20 MG Tab Take  "3 tablets (60 mg total) by mouth once daily. 90 tablet 2    ursodiol (ACTIGALL) 300 mg capsule Take 1 capsule (300 mg total) by mouth 2 (two) times daily. 60 capsule 11     No current facility-administered medications on file prior to encounter.      Allergies:  Bactrim [sulfamethoxazole-trimethoprim]    Medical ROS:  See H&P dated 6/29/18 for ROS.     OBJECTIVE:     Vital Signs (Most Recent):  There were no vitals filed for this visit.    Mental Status Exam:  Appearance: casually dressed, thin & gaunt looking  Behavior/Cooperation: cooperative  Speech: normal tone, normal rate, normal pitch, normal volume  Mood: "tired"  Affect: Mood congruent  Thought Process: logical  Thought Content: normal, no suicidality, no homicidality, delusions, or paranoia  Orientation: grossly intact  Memory: Grossly intact  Attention Span/Concentration: Normal  Cognition: grossly intact  Insight: fair  Judgment: fair    Laboratory:  Recent Results (from the past 168 hour(s))   POCT BREATH ALCOHOL TEST    Collection Time: 07/03/18 10:59 AM   Result Value Ref Range    Breath Alcohol 0.000    POCT BREATH ALCOHOL TEST    Collection Time: 07/05/18 11:20 AM   Result Value Ref Range    Breath Alcohol 0.000    Toxicology screen, urine    Collection Time: 07/06/18 10:51 AM   Result Value Ref Range    Alcohol, Urine <10 <10 mg/dL    Benzodiazepines Negative     Methadone metabolites Negative     Cocaine (Metab.) Negative     Opiate Scrn, Ur Negative     Barbiturate Screen, Ur Negative     Amphetamine Screen, Ur Negative     THC Negative     Phencyclidine Negative     Creatinine, Random Ur 19.0 (L) 23.0 - 375.0 mg/dL    Toxicology Information SEE COMMENT    POCT BREATH ALCOHOL TEST    Collection Time: 07/06/18 10:52 AM   Result Value Ref Range    Breath Alcohol 0.000      ASSESSMENT:     Alcohol use disorder, severe, in early remission  Opioid use disorder, mild  Unspecified depression (MDD vs. Adjustment disorder)    PLAN:     · Continue patient " on ABU protocol.  · Breathalyzer and urine toxicology daily.  · VS daily x3 days.  · Patient counseled on abstinence from alcohol, opiates (other than Suboxone), and all other drugs of abuse.    Medications: Continue current medications. The risks and benefits of medication were discussed with the patient.  · Continue Suboxone 4 mg qAM and 2 mg QHS for opiate use disorder and pain    · Continue Remeron 7.5 mg QHS for sleep, mood, and anxiety      Status: Continue treatment on ABU    Patient's Intervention Response: accepting    Case discussed with Blank Umana MD.    Singh Hall,   [facility]   PGY-2

## 2018-07-10 ENCOUNTER — HOSPITAL ENCOUNTER (OUTPATIENT)
Dept: PSYCHIATRY | Facility: HOSPITAL | Age: 28
Discharge: HOME | End: 2018-07-10
Attending: PSYCHIATRY & NEUROLOGY
Payer: COMMERCIAL

## 2018-07-10 DIAGNOSIS — R45.89 ANXIETY ABOUT HEALTH: ICD-10-CM

## 2018-07-10 DIAGNOSIS — F10.21 ALCOHOL USE DISORDER, SEVERE, IN EARLY REMISSION: ICD-10-CM

## 2018-07-10 LAB — BREATH ALCOHOL: 0

## 2018-07-10 PROCEDURE — 90853 GROUP PSYCHOTHERAPY: CPT | Mod: NTX | Performed by: SOCIAL WORKER

## 2018-07-10 PROCEDURE — 90853 GROUP PSYCHOTHERAPY: CPT | Mod: ,,, | Performed by: PSYCHOLOGIST

## 2018-07-10 PROCEDURE — 90853 GROUP PSYCHOTHERAPY: CPT

## 2018-07-10 NOTE — PROGRESS NOTES
Group Psychotherapy (PhD/LCSW)    Site: [facility]    Clinical status of patient: Intensive Outpatient Program (IOP)    Date: 7/10/2018    Group Focus: Disease Model of Addiction    Length of service: 88048 - 45-50 minutes    Number of patients in attendance: 10    Referred by: Addictive Behavior Unit Treatment Team    Target symptoms: Alcohol Abuse    Patient's response to treatment: Active Listening and Self-disclosure    Progress toward goals: Progressing adequately    Interval History: Discussed the basic neuropsychological concepts of the Disease Model of Addiction and their relationship to the phenomena of addiction (eg, powerlessness; cravings; euphoric recall; tolerance; relapse). Noted the value of understanding the Disease Model for sustaining long-term sobriety.     Diagnosis: alcohol use disorder, severe, in early remission    Plan: Continue treatment on ABU

## 2018-07-10 NOTE — PROGRESS NOTES
Group Psychotherapy (PhD/LCSW)    Site: [facility]    Clinical status of patient: Intensive Outpatient Program (IOP)    Date: 7/10/2018    Group Focus: ACT Group Psychotherapy    Length of service: 04497 - 45-50 minutes    Number of patients in attendance: 14    Referred by: Addictive Behavior Unit Treatment Team    Target symptoms: Alcohol Abuse    Patient's response to treatment: Active Listening and Self-disclosure    Progress toward goals: Progressing adequately    Interval History: Session focus was Values.  Patients were introduced to values and provided with the values compass to complete.  Each patient discussed a value that is most important to focus on at this time.    Diagnosis: alcohol use disorder, severe, in early remission    Plan: Continue treatment on ABU

## 2018-07-10 NOTE — TELEPHONE ENCOUNTER
returned call to pt's mother anabell. She let me know that Jhonny will need toe 0.5mg caps of rapa, and also was checking on a specialty med  Promacta. Gave her the number to the specialty pharm.

## 2018-07-10 NOTE — PLAN OF CARE
07/10/18 1400   Activity/Group Therapy Checklist   Group Goals/Reflection   Attendance Attended   Follows Direction Followed directions   Group Interactions/Observations Interacted appropriately   Affect/Mood Range Normal range   Affect/Mood Display Appropriate   Goal Progression Progressing

## 2018-07-10 NOTE — PROGRESS NOTES
Group Psychotherapy (PhD/LCSW)    Site: [facility]    Clinical status of patient: Intensive Outpatient Program (IOP)    Date: 7/10/2018    Group Focus: Psychodynamic Group Psychotherapy    Length of service: 10944 - 45-50 minutes    Number of patients in attendance: 10    Referred by: Addictive Behavior Unit Treatment Team    Target symptoms: Alcohol Abuse    Patient's response to treatment: Active Listening and Self-disclosure    Progress toward goals: Progressing adequately    Interval History: Pt gave supportive, empathic feedback to another pt who had relapsed.     Diagnosis: alcohol use disorder, severe, in early remission    Plan: Continue treatment on ABU

## 2018-07-10 NOTE — TELEPHONE ENCOUNTER
----- Message from Jolly Rosario sent at 7/10/2018  8:41 AM CDT -----  Contact: pt mom  Needs Advice    Reason for call:    Wants to discuss the hold up on promacta and why his profrag medication changed.  Communication Preference: [phone]  Additional Information: n/a

## 2018-07-10 NOTE — TELEPHONE ENCOUNTER
Notified patient's mother, Elvira, that prior authorization for Promacta had been denied.  Urgent appeal was currently being written and should be submitted today or tomorrow.

## 2018-07-11 ENCOUNTER — TELEPHONE (OUTPATIENT)
Dept: TRANSPLANT | Facility: CLINIC | Age: 28
End: 2018-07-11

## 2018-07-11 ENCOUNTER — HOSPITAL ENCOUNTER (OUTPATIENT)
Dept: PSYCHIATRY | Facility: HOSPITAL | Age: 28
Discharge: HOME | End: 2018-07-11
Attending: PSYCHIATRY & NEUROLOGY
Payer: COMMERCIAL

## 2018-07-11 ENCOUNTER — HOSPITAL ENCOUNTER (EMERGENCY)
Facility: HOSPITAL | Age: 28
Discharge: HOME | End: 2018-07-11
Attending: EMERGENCY MEDICINE
Payer: COMMERCIAL

## 2018-07-11 VITALS
WEIGHT: 140 LBS | RESPIRATION RATE: 13 BRPM | OXYGEN SATURATION: 100 % | HEART RATE: 95 BPM | BODY MASS INDEX: 22.5 KG/M2 | SYSTOLIC BLOOD PRESSURE: 129 MMHG | DIASTOLIC BLOOD PRESSURE: 76 MMHG | TEMPERATURE: 99 F | HEIGHT: 66 IN

## 2018-07-11 VITALS — DIASTOLIC BLOOD PRESSURE: 77 MMHG | SYSTOLIC BLOOD PRESSURE: 123 MMHG | RESPIRATION RATE: 16 BRPM | HEART RATE: 103 BPM

## 2018-07-11 DIAGNOSIS — M79.89 SWELLING OF LEFT LOWER EXTREMITY: ICD-10-CM

## 2018-07-11 DIAGNOSIS — F10.21 ALCOHOL USE DISORDER, SEVERE, IN EARLY REMISSION: ICD-10-CM

## 2018-07-11 DIAGNOSIS — R45.89 ANXIETY ABOUT HEALTH: ICD-10-CM

## 2018-07-11 DIAGNOSIS — R07.9 CHEST PAIN: Primary | ICD-10-CM

## 2018-07-11 LAB
ABO + RH BLD: NORMAL
ALBUMIN SERPL BCP-MCNC: 3.5 G/DL
ALP SERPL-CCNC: 194 U/L
ALT SERPL W/O P-5'-P-CCNC: 38 U/L
AMPHET+METHAMPHET UR QL: NEGATIVE
ANION GAP SERPL CALC-SCNC: 14 MMOL/L
ANISOCYTOSIS BLD QL SMEAR: SLIGHT
AST SERPL-CCNC: 34 U/L
BARBITURATES UR QL SCN>200 NG/ML: NEGATIVE
BASOPHILS NFR BLD: 0 %
BENZODIAZ UR QL SCN>200 NG/ML: NEGATIVE
BILIRUB SERPL-MCNC: 0.7 MG/DL
BILIRUB UR QL STRIP: NEGATIVE
BLD GP AB SCN CELLS X3 SERPL QL: NORMAL
BNP SERPL-MCNC: 91 PG/ML
BREATH ALCOHOL: 0
BUN SERPL-MCNC: 65 MG/DL
BZE UR QL SCN: NEGATIVE
CALCIUM SERPL-MCNC: 9.1 MG/DL
CANNABINOIDS UR QL SCN: NEGATIVE
CHLORIDE SERPL-SCNC: 106 MMOL/L
CLARITY UR REFRACT.AUTO: CLEAR
CO2 SERPL-SCNC: 21 MMOL/L
COLOR UR AUTO: NORMAL
CREAT SERPL-MCNC: 2.7 MG/DL
CREAT UR-MCNC: 49 MG/DL
DIFFERENTIAL METHOD: ABNORMAL
EOSINOPHIL NFR BLD: 0 %
ERYTHROCYTE [DISTWIDTH] IN BLOOD BY AUTOMATED COUNT: 14.7 %
EST. GFR  (AFRICAN AMERICAN): 35.5 ML/MIN/1.73 M^2
EST. GFR  (NON AFRICAN AMERICAN): 30.7 ML/MIN/1.73 M^2
ETHANOL UR-MCNC: <10 MG/DL
GLUCOSE SERPL-MCNC: 112 MG/DL
GLUCOSE UR QL STRIP: NEGATIVE
HCT VFR BLD AUTO: 33.6 %
HGB BLD-MCNC: 10.9 G/DL
HGB UR QL STRIP: NEGATIVE
HYPOCHROMIA BLD QL SMEAR: ABNORMAL
IMM GRANULOCYTES # BLD AUTO: ABNORMAL K/UL
IMM GRANULOCYTES NFR BLD AUTO: ABNORMAL %
KETONES UR QL STRIP: NEGATIVE
LACTATE SERPL-SCNC: 1.5 MMOL/L
LEUKOCYTE ESTERASE UR QL STRIP: NEGATIVE
LYMPHOCYTES NFR BLD: 14 %
MAGNESIUM SERPL-MCNC: 2 MG/DL
MCH RBC QN AUTO: 31.9 PG
MCHC RBC AUTO-ENTMCNC: 32.4 G/DL
MCV RBC AUTO: 98 FL
METHADONE UR QL SCN>300 NG/ML: NEGATIVE
MONOCYTES NFR BLD: 1 %
NEUTROPHILS NFR BLD: 81 %
NEUTS BAND NFR BLD MANUAL: 4 %
NITRITE UR QL STRIP: NEGATIVE
NRBC BLD-RTO: 0 /100 WBC
OPIATES UR QL SCN: NEGATIVE
OVALOCYTES BLD QL SMEAR: ABNORMAL
PCP UR QL SCN>25 NG/ML: NEGATIVE
PH UR STRIP: 7 [PH] (ref 5–8)
PHOSPHATE SERPL-MCNC: 4.3 MG/DL
PLATELET # BLD AUTO: 46 K/UL
PLATELET BLD QL SMEAR: ABNORMAL
PMV BLD AUTO: 11.8 FL
POIKILOCYTOSIS BLD QL SMEAR: SLIGHT
POLYCHROMASIA BLD QL SMEAR: ABNORMAL
POTASSIUM SERPL-SCNC: 4.2 MMOL/L
PROT SERPL-MCNC: 7.7 G/DL
PROT UR QL STRIP: NEGATIVE
RBC # BLD AUTO: 3.42 M/UL
SODIUM SERPL-SCNC: 141 MMOL/L
SP GR UR STRIP: 1 (ref 1–1.03)
TOXICOLOGY INFORMATION: NORMAL
TROPONIN I SERPL DL<=0.01 NG/ML-MCNC: 0.01 NG/ML
URN SPEC COLLECT METH UR: NORMAL
UROBILINOGEN UR STRIP-ACNC: NEGATIVE EU/DL
WBC # BLD AUTO: 5.87 K/UL

## 2018-07-11 PROCEDURE — 84484 ASSAY OF TROPONIN QUANT: CPT | Mod: NTX

## 2018-07-11 PROCEDURE — 90853 GROUP PSYCHOTHERAPY: CPT

## 2018-07-11 PROCEDURE — 87040 BLOOD CULTURE FOR BACTERIA: CPT | Mod: NTX

## 2018-07-11 PROCEDURE — 81003 URINALYSIS AUTO W/O SCOPE: CPT | Mod: NTX

## 2018-07-11 PROCEDURE — 83735 ASSAY OF MAGNESIUM: CPT | Mod: NTX

## 2018-07-11 PROCEDURE — 93010 ELECTROCARDIOGRAM REPORT: CPT | Mod: NTX,,, | Performed by: INTERNAL MEDICINE

## 2018-07-11 PROCEDURE — 83605 ASSAY OF LACTIC ACID: CPT | Mod: NTX

## 2018-07-11 PROCEDURE — 80307 DRUG TEST PRSMV CHEM ANLYZR: CPT

## 2018-07-11 PROCEDURE — 84100 ASSAY OF PHOSPHORUS: CPT | Mod: NTX

## 2018-07-11 PROCEDURE — 93005 ELECTROCARDIOGRAM TRACING: CPT | Mod: NTX

## 2018-07-11 PROCEDURE — 86850 RBC ANTIBODY SCREEN: CPT | Mod: NTX

## 2018-07-11 PROCEDURE — 80053 COMPREHEN METABOLIC PANEL: CPT | Mod: NTX

## 2018-07-11 PROCEDURE — 94761 N-INVAS EAR/PLS OXIMETRY MLT: CPT | Mod: NTX

## 2018-07-11 PROCEDURE — 99284 EMERGENCY DEPT VISIT MOD MDM: CPT | Mod: ,,, | Performed by: PHYSICIAN ASSISTANT

## 2018-07-11 PROCEDURE — 90853 GROUP PSYCHOTHERAPY: CPT | Mod: ,,, | Performed by: PSYCHIATRY & NEUROLOGY

## 2018-07-11 PROCEDURE — 99284 EMERGENCY DEPT VISIT MOD MDM: CPT | Mod: 25,NTX

## 2018-07-11 PROCEDURE — 83880 ASSAY OF NATRIURETIC PEPTIDE: CPT | Mod: NTX

## 2018-07-11 NOTE — TELEPHONE ENCOUNTER
----- Message from Shereen Mcguire DNP sent at 7/11/2018  4:01 PM CDT -----  He is wondering about where he is on the kidney transplant referral. States he hasn't followed up with a nephrologist or [facility] since discharged from the hospital and has no future appointments scheduled. He would like an update about where he is with that. Also, he said his prednisone ends in 3 weeks. I wasn't sure if he should be on maintenance prednisone while on monotherapy with rapamune or if the prednisone should end in 3 weeks (previously on taper, down to 10mg/day currently). He said he doesn't have an appointment scheduled with Dr. Rogers so that's why I'm checking.

## 2018-07-11 NOTE — NURSING
Was called to group room, pt. Was in tears.  Stated he had chest pain, felt like someone stabbed him right thru his heart.  Asked him to rate his Pain with numerical system and he stated it had subsided, it was a 10 being the worst. VS taken 149/87, ht rate 111, r-20.  Escorted to ER via wheelchair per nurse.  Pt. Checked into ER and report given to triage nurse.  Inst. Pt. To let us know how he makes out.

## 2018-07-11 NOTE — TELEPHONE ENCOUNTER
Noted. Working with schedulers to get appts. His psych program is booked for 8a-5p and will not let us override to schedule an appt.

## 2018-07-11 NOTE — ED PROVIDER NOTES
Encounter Date: 7/11/2018       History     Chief Complaint   Patient presents with    Chest Pain     Pt presented to the ED via w/c. Pt was sent to the ED for further evaluation of chest pain from the clinic. Pt's states they made some changes in his medication and the changes caused his chest pain. Pt's liver transplant was last October.      Patient is a 19-year-old male with past medical history of liver transplant on 10/2017 who presents the ED with transient chest pain. Patient was at group therapy earlier and around 11:00, he developed acute onset of left-sided chest pain that lasted approximately 10 min.  He states that it felt like a knife being twisted in his chest.  He endorses associated diaphoresis, but denies any diet dizziness, lightheadedness, SOB, cough, extremity paresthesias at the time.  He states that he has never had these symptoms before.  He denies feeling anxious or stressed at group therapy when his symptoms came on.  Patient recently had subtherapeutic levels of sirolimus and increased his dose to 1.5 mg today.  He did not take his new thrombocytopenia medication (Promacta) bc he is awaiting prior authorization.  He denies any fever, nasal congestion, sore throat, vomiting, diarrhea, urinary difficulties.  He notes epistasis that resolved on its own.          Review of patient's allergies indicates:   Allergen Reactions    Bactrim [sulfamethoxazole-trimethoprim] Other (See Comments)     Mookie Trell Syndrome     Past Medical History:   Diagnosis Date    Alcoholic hepatitis with ascites     Anemia     ESRD on dialysis     History of hematemesis 9/28/2017    Hypertension     Seizures     Thrombocytopenia     Transplanted liver     10/2017     Past Surgical History:   Procedure Laterality Date    APPENDECTOMY      ERCP      ERCP N/A 7/2/2018    Procedure: ERCP;  Surgeon: Solis Villanueva MD;  Location: [facility] ([address] [place] [address]);  Service: Endoscopy;  Laterality: N/A;   dialysis/cirrhosis/labs prior to ERCP/svn  platelet    ESOPHAGOGASTRODUODENOSCOPY      LIVER TRANSPLANT      10/2017     Family History   Problem Relation Age of Onset    No Known Problems Mother      Social History   Substance Use Topics    Smoking status: Former Smoker     Packs/day: 1.00     Years: 10.00     Types: Cigarettes     Start date: 2/19/2017    Smokeless tobacco: Never Used    Alcohol use No      Comment: a fifth of liquor daily for years, cut back over last 2 months     Review of Systems   Constitutional: Positive for diaphoresis. Negative for chills and fever.   HENT: Positive for rhinorrhea. Negative for sore throat.    Eyes: Negative for photophobia.   Respiratory: Negative for cough and shortness of breath.    Cardiovascular: Positive for chest pain. Negative for palpitations.   Gastrointestinal: Negative for abdominal pain, nausea and vomiting.   Genitourinary: Negative for dysuria and hematuria.   Musculoskeletal: Negative for back pain and neck pain.   Skin: Negative for rash.   Neurological: Negative for weakness.   Hematological: Does not bruise/bleed easily.       Physical Exam     Initial Vitals [07/11/18 1113]   BP Pulse Resp Temp SpO2   130/69 104 17 99.3 °F (37.4 °C) 99 %      MAP       --         Physical Exam    Vitals reviewed.  Constitutional: He appears well-developed and well-nourished. He is not diaphoretic. No distress.   HENT:   Head: Normocephalic and atraumatic.   Nose: Nose normal.   Eyes: Conjunctivae and EOM are normal.   Neck: Normal range of motion.   Cardiovascular: Regular rhythm and normal heart sounds. Tachycardia present.  Exam reveals no friction rub.    No murmur heard.  Slightly increase edema in RLE when compared to LLE. No calf tenderness.    Pulmonary/Chest: Breath sounds normal. No respiratory distress. He has no wheezes. He has no rales. He exhibits no bony tenderness.   Abdominal: Soft. Bowel sounds are normal. He exhibits distension. There is no  tenderness. There is no rigidity, no rebound and no guarding.   Musculoskeletal: Normal range of motion.   Neurological: He is alert and oriented to person, place, and time. He has normal strength. No sensory deficit.   Skin: Skin is warm and dry. No erythema.   Old surgical scars noted.   Psychiatric: He has a normal mood and affect. Thought content normal.         ED Course   Procedures  Labs Reviewed   CBC W/ AUTO DIFFERENTIAL - Abnormal; Notable for the following:        Result Value    RBC 3.42 (*)     Hemoglobin 10.9 (*)     Hematocrit 33.6 (*)     MCH 31.9 (*)     RDW 14.7 (*)     Platelets 46 (*)     Gran% 81.0 (*)     Lymph% 14.0 (*)     Mono% 1.0 (*)     Platelet Estimate Decreased (*)     All other components within normal limits   COMPREHENSIVE METABOLIC PANEL - Abnormal; Notable for the following:     CO2 21 (*)     Glucose 112 (*)     BUN, Bld 65 (*)     Creatinine 2.7 (*)     Alkaline Phosphatase 194 (*)     eGFR if  35.5 (*)     eGFR if non  30.7 (*)     All other components within normal limits   CULTURE, BLOOD   TROPONIN I   B-TYPE NATRIURETIC PEPTIDE   PHOSPHORUS   MAGNESIUM   LACTIC ACID, PLASMA   URINALYSIS, REFLEX TO URINE CULTURE    Narrative:     Preferred Collection Type->Urine, Clean Catch   TROPONIN I   TYPE & SCREEN          Imaging Results          US Lower Extremity Veins Right (Final result)  Result time 07/11/18 15:44:59    Final result by Karolina Varner MD (07/11/18 15:44:59)                 Impression:      No evidence of deep venous thrombosis in the right lower extremity.    Electronically signed by resident: Suleman Schneider  Date:    07/11/2018  Time:    15:02    Electronically signed by: Karolina Varner MD  Date:    07/11/2018  Time:    15:44             Narrative:    EXAMINATION:  US LOWER EXTREMITY VEINS RIGHT    CLINICAL HISTORY:  Chest pain, unspecified    TECHNIQUE:  Duplex and color flow Doppler evaluation and graded compression of the right  lower extremity veins was performed.    COMPARISON:  None    FINDINGS:  Right thigh veins: The common femoral, femoral, popliteal, upper greater saphenous, and deep femoral veins are patent and free of thrombus. The veins are normally compressible and have normal phasic flow and augmentation response.    Right calf veins: The visualized calf veins are patent.    Contralateral CFV: The contralateral (left) common femoral vein is patent and free of thrombus.    Miscellaneous: None                               X-Ray Chest AP Portable (Final result)  Result time 07/11/18 12:57:52    Final result by Solis Cuevas MD (07/11/18 12:57:52)                 Impression:      No significant intrathoracic abnormality referable to the current history of chest pain.  Volume of pleural fluid on the right side has decreased since 06/07/2018, with only a minimal amount of fluid remaining, and there has been no significant detrimental interval change in the appearance of the chest since that time.      Electronically signed by: Solis Cuevas MD  Date:    07/11/2018  Time:    12:57             Narrative:    EXAMINATION:  XR CHEST AP PORTABLE    CLINICAL HISTORY:  Chest Pain;    COMPARISON:  Comparison is made to 06/07/2018.    FINDINGS:  Vascular catheter again noted, its tip in the superior vena cava near its junction with the right atrium.  Allowing for magnification of the cardiomediastinal silhouette relating to projection, the heart does not appear significantly enlarged, and there has been no significant detrimental change in the appearance of the cardiomediastinal silhouette or pulmonary vascularity since the exam referenced above.  Lung zones are clear, and free of significant airspace consolidation or volume loss.  A minimal of pleural fluid persists on the right side, although it has decreased in volume since the prior study.  No pneumothorax.                                       APC / Resident Notes:   Patient presents to the  ED with chest pain that last 10 minutes around 1100 today. He is currently back at baseline.     On exam, low-grade temp of 99.4°.  Tachycardic.  NAD.  Nontoxic appearing.  Tachycardia with regular rate.  Lungs clear to auscultations bilaterally without wheezes or rales.  Abdomen distended with generalized mild tenderness. Old surgical scars noted. 1-2 +pitting edema to right lower extremity.  No calf tenderness.    DDX includes but is not limited to anxiety, musculoskeletal pain, ACS, medication side effect (recently increase sicrolimus), pneumonia, sepsis, and less likely PE.  Will initiate workup, obtain US given RLE swelling, and reassess.     CBC with no leukocytosis.  H/H at 10/33.  Thrombocytopenic at 46, however trending up.  CMP with no electrolyte abnormalities.  Glucose of 112.  BUN/creatinine elevated but at baseline at 65/2.7.  Patient being evaluated for kidney transplant.    Alk phos elevated but at baseline at 194. AST and ALT wnl.  Troponin negative.    BMP and 91.    Chest x-ray with no acute processes.  Right-sided pleural effusion improved when compared to previous chest x-ray. Lung zones are clear, and free of significant airspace consolidation or volume loss.    Liver transplant team notified. They will evaluate.     2:44 PM  Patient sent to US.    4:08 PM  US with no evidence of DVT. PE more unlikely. Liver transplant evaluated patient and are OK with discharging patient. Consult appreciated. Given work up today, chest pain may have been stress induced (he was in group therapy) vs acid reflux/gas. Patient is to f/u closely with all appts including liver transplant and nephro. Strict ED return precautions given, and he voices understanding. I have reviewed patient's chart and discuss this case with my supervising MD.                    Clinical Impression:   The primary encounter diagnosis was Chest pain. A diagnosis of Swelling of left lower extremity was also pertinent to this  visit.      Disposition:   Disposition: Discharged  Condition: Stable                        Savanna Yost PA-C  07/12/18 1037

## 2018-07-11 NOTE — PATIENT CARE CONFERENCE
ABU Staffing:      Alcohol use disorder, severe, in early remission  Opioid use disorder, mild  Unspecified depression (MDD vs. Adjustment disorder)        1. Pt is attending all groups    2. Pt is attending all meetings  3. Pt 's has minimally supportive family  4. Pt has completed spiritual assessment    5. Pt will present life story    6. Pt will present Step One assignment    7. Pt is exploring issues related to relapse  prevention; spirituality; stress management; improved communication skills; assertiveness training; poor self-esteem; disease concepts; cross addictions; and, work related issues    8. D/C date: TBD     Staff discussed pt's psychosocial hx related to heavy alcohol use in teens, opiate abuse, emergent liver transplant, post optical complications, and med management w/ suboxone. Staff discussed plan to decrease suboxone near D/C date. Staff discussed pt's participation in group and improved ADLs. Staff discussed pt's perseveration on somatic symptoms and minimal processing of substance use hx.  Staff discussed pt need to bring mother in for family day.     Problem: Alcohol use disorder, severe, early remission  Goal: Address in 12 step meetings and group and individual sessions    Objective Measure: participation in groups, self report, length of sobriety, and relapse prevention plan  Time: Prior to discharge    Progress: Pt is attending groups and sessions     Problem: Opioid use disorder, mild  Goal: Address in 12 step meetings and group and individual sessions    Objective Measure: participation in groups, self report, length of sobriety, and relapse prevention plan  Time: Prior to discharge    Progress: Pt is attending groups and sessions    Problem: Unspecified depression (MDD vs. Adjustment disorder)  Goal: Address in 12 step meetings and group and individual sessions    Objective Measure: participation in groups, self report, length of sobriety, and relapse prevention plan  Time: Prior to  discharge    Progress: Pt is attending groups and sessions          Staff members present:    MD Dr. Ivis Cartagena MD Resident  Dr. Isabel DO Resident  Cassy Pollock, LCSW  Vasquez Guillen, ARIKW  Lorie Garcia RN

## 2018-07-11 NOTE — DISCHARGE INSTRUCTIONS
Continue take all medication as prescribed.  Follow up closely with your appointments as scheduled. Return to the emergency department for new or worsening symptoms.     Future Appointments  Date Time Provider Department Center   7/12/2018 8:00 AM [facility], [facility]   7/13/2018 8:00 AM [facility], [address] [facility]   7/16/2018 7:45 AM [facility]   7/16/2018 8:00 AM [facility], [facility] [facility] [facility]   7/17/2018 8:00 AM [facility], [facility] [facility]   7/18/2018 8:00 AM [facility], [facility] [facility]   7/19/2018 8:00 AM [facility], [facility] [facility] [facility]   7/20/2018 8:00 AM [facility], [facility] [facility] [facility]   7/20/2018 2:30 PM Farzad Shine MD [facility] GA[facility] [address]   7/23/2018 8:00 AM [facility], [facility] [facility]   7/24/2018 8:00 AM [facility], [facility] [facility] [facility]   7/25/2018 8:00 AM [facility], [facility] [facility]   7/26/2018 8:00 AM [facility], [facility] [facility]   7/27/2018 8:00 AM [facility], [facility] [facility] [facility]   7/27/2018 10:40 AM LAB, [facility]ON[facility]LDG [facility]   7/27/2018 11:40 AM Syed Maurice MD [facility] BM FIELDS [facility]   7/30/2018 8:00 AM [facility], [address] [facility]   7/31/2018 8:00 AM [facility], [facility] [facility]   8/1/2018 8:00 AM [facility], [address] [facility]   8/2/2018 8:00 AM [facility], [facility] [facility]   8/3/2018 8:00 AM [facility], [facility]   8/6/2018 8:00 AM [facility], [facility]   8/7/2018 8:00 AM [facility], [facility]   8/8/2018 8:00 AM [facility]   8/9/2018 8:00 AM ABU, [facility] [address]       Our goal in the emergency department is to always give you outstanding care and exceptional service. You may receive a survey by mail or e-mail in the next week regarding your experience in our ED. We would greatly appreciate your completing and returning the survey. Your feedback provides  us with a way to recognize our staff who give very good care and it helps us learn how to improve when your experience was below our aspiration of excellence.

## 2018-07-11 NOTE — PROGRESS NOTES
"Jhonny Diana is a 27 yr old male h/o ETOH cirrhosis s/p liver transplant 10/19/17. Significant post op course. He presented to the ER today after experiencing L sided chest pain during group therapy this morning. He reports pain came on suddenly, felt like "someone was stabbing a knife through my chest." Pain did not radiate and resolved spontaneously in under 10 minutes. He denies any other symptoms at that time, denies anxiety or panic. Upon assessment, patient ambulating in room, denies pain or SOB, making jokes and laughing with his mother. Patient reports increasing belching recently and thinks pain could be related to trapped gas. Of note, RLE edematous, BLE US today negative for DVT. Cardiac work up negative for source of pain. Denies fever, dyspnea, abdominal pain. Ok for patient to be discharged from ER to resume regularly scheduled follow up.   "

## 2018-07-11 NOTE — ED NOTES
Pt placed on cardiac monitor, continuous pulse ox, cycling blood pressures. Side rails up x2, call bell in reach, bed in low position with brake engaged. Family at bedside.      xray at bedside at this time.

## 2018-07-11 NOTE — NURSING
Spoke with pt. , stated he is still in ER and that he will let his Mom know about his event and where he is.

## 2018-07-12 ENCOUNTER — HOSPITAL ENCOUNTER (OUTPATIENT)
Dept: PSYCHIATRY | Facility: HOSPITAL | Age: 28
Discharge: HOME | End: 2018-07-12
Attending: PSYCHIATRY & NEUROLOGY
Payer: COMMERCIAL

## 2018-07-12 DIAGNOSIS — F10.21 ALCOHOL USE DISORDER, SEVERE, IN EARLY REMISSION: Primary | ICD-10-CM

## 2018-07-12 DIAGNOSIS — R45.89 ANXIETY ABOUT HEALTH: ICD-10-CM

## 2018-07-12 LAB
BREATH ALCOHOL: 0
FUNGUS SPEC CULT: NORMAL

## 2018-07-12 PROCEDURE — 99232 SBSQ HOSP IP/OBS MODERATE 35: CPT | Mod: ,,, | Performed by: PSYCHIATRY & NEUROLOGY

## 2018-07-12 PROCEDURE — 90853 GROUP PSYCHOTHERAPY: CPT | Performed by: SOCIAL WORKER

## 2018-07-12 PROCEDURE — 90853 GROUP PSYCHOTHERAPY: CPT

## 2018-07-12 PROCEDURE — 90853 GROUP PSYCHOTHERAPY: CPT | Mod: 59,,, | Performed by: PSYCHOLOGIST

## 2018-07-12 NOTE — PLAN OF CARE
07/12/18 1000   Activity/Group Therapy Checklist   Group Goals/Reflection  (life story )   Attendance Attended   Follows Direction Followed directions   Group Interactions/Observations Interacted appropriately   Affect/Mood Range Normal range   Affect/Mood Display Appropriate   Goal Progression Progressing

## 2018-07-12 NOTE — PROGRESS NOTES
Group Psychotherapy (PhD/LCSW)    Site: [facility]    Clinical status of patient: Intensive Outpatient Program (IOP)    Date: 7/12/2018    Group Focus: Psychodynamic Group Therapy     Length of service: 27258 - 45-50 minutes    Number of patients in attendance: 7    Referred by: Addictive Behavior Unit Treatment Team    Target symptoms: Alcohol Abuse    Patient's response to treatment: Active Listening and Self-disclosure    Progress toward goals: Progressing adequately    Interval History:  Pt discussed his negative feelings about people in general and his tendency to socially isolate. He was able to acknowledge that isolation and resentment present challenges to him in his recovery.    Diagnosis: alcohol use disorder, severe, in early remission    Plan: Continue treatment on ABU

## 2018-07-12 NOTE — TELEPHONE ENCOUNTER
Decision upheld upon urgent appeal.  MD would like us to proceed with contacting patient and  so that patient may receive medication from [employer].      LVM for patient requesting callback to explain appeal decision and ask if they would like to apply for assistance through the .

## 2018-07-12 NOTE — PLAN OF CARE
07/12/18 1400   Activity/Group Therapy Checklist   Group Relapse Prevention  (Step Work )   Attendance Attended   Follows Direction Followed directions   Group Interactions/Observations Interacted appropriately   Affect/Mood Range Normal range   Affect/Mood Display Appropriate   Goal Progression Progressing

## 2018-07-12 NOTE — PROGRESS NOTES
2018 5:32 PM  Name: Jhonny Diana  : 1990  Start Date: 18    ABU Intensive Outpatient Program   Progress Note    Status: Intensive Outpatient Program (IOP)    HPI:  Jhonny Diana is a 27 y.o. male who presents for admission to ABU with principal problem of opioid use, as well as hx of alcohol use disorder.      Pt is s/p liver transplant in 10/2017 due to acute alcoholic hepatitis. He has a hx of heavy alcohol use since his teens. He required emergent liver transplantation prior to being able to undergo any type of substance abuse treatment. It was recommended that pt attend the ABU program after he received his liver transplant as he was deemed to be very high risk for relapse post-transplant. However pt never followed up in the ABU due to a number of post-op medical complications including ESRD requiring HD (Mon/Fri). In 2018 the addiction psychiatry team was consulted while pt was admitted to the hospital due to concerns for opioid addiction. Pt was started on Suboxone and again recommended to f/u with the ABU for substance abuse treatment.      Today pt reports that he is feeling well physically. He confirms hx of alcohol use since the age of 18. He escalated to drinking a fifth of liquor daily in his early to mid twenties. He attempted to cut back a number of times but was unsuccessful. He has attended AA meetings in the past but has never been to a formal rehab program. He minimizes the effects that alcohol had in his life as he states that he mostly drank by himself, so it did not affect his relationships. He does admit that alcohol was a problem for him however, and recognizes the need for continued life long sobriety following his liver transplant.      Regarding his opiate use, pt reports using 10 mg of oxycontin every 4 hours as was prescribed to him. He reports that he began getting prescriptions for opiates a few months prior to his transplant, and continued to use them until this  "month when he was transitioned to Suboxone. He denies ever buying opiates or using meds which were not prescribed to him. He denies ever injecting or snorting them. He does report feeling better both physically and mentally on Suboxone. He reports feeling like his head is clearer and pain is better controlled. He denies any current side effects from Suboxone or cravings to use other opiates.      Last drink/use: Last use of alcohol 9/2017. Last use of narcotic pain medications (other than Suboxone) 6/14/18  Average consumption: drank a fifth of liquor daily. Used Oxycontin 10 mg PO q4h  Withdrawal: yes, hx of tremors and anxiety from alcohol withdrawal. No hx of complicated alcohol withdrawal or opiate withdrawal  Spouse/partner consumption: n/a  Rehab/Detox: no  AA/NA: yes, has been to AA before. Went for approx 1 year prior to his transplant. States "not a fan" due to Tenriism aspect    SUBJECTIVE:     Interval Hx:  Patient was brought to the ED yesterday after complaining of chest pain, 10/10 non-radiating. Attributes this pain to increase in liver transplant medications (making patient more gaseous). Cardiac work up was negative. Patient attempted to go into supply cart in the ED. Patient states he was attempting to play a joke on his mother, telling her "I will stab you with a needle." When asked why he attempted to enter crash cart, "I don't know why everyone is making a big deal of this."     Patient went to cocaine anonymous meeting yesterday, didn't find it as beneficial as his home group.  Endorses poor sleep given his recent ED visit for chest pain. Patient woke up fatigued, sleeping 4-5 hours. Patient still hasn't received any lab results from Nephrology. Mood is "same ol, same ol." Notes his mom is coming in for family day.     Medication Side Effects: None  Cravings: no, "No offense, but you'd have to be stupid to have cravings at this point"  No other acute psychiatric issues reported at this " time.    Scheduled Meds:   Current Outpatient Prescriptions on File Prior to Encounter   Medication Sig Dispense Refill    amLODIPine (NORVASC) 5 MG tablet Take 1 tablet (5 mg total) by mouth once daily. 30 tablet 11    bisacodyl (DULCOLAX) 5 mg EC tablet Take 2 tablets (10 mg total) by mouth daily as needed for Constipation. 60 tablet 5    buprenorphine-naloxone 2-0.5 mg (SUBOXONE) 2-0.5 mg Film Take 2 film sublingually in the morning and 1 film sublingually in the evening 42 each 0    ciprofloxacin HCl (CIPRO) 250 MG tablet Take 1 tablet (250 mg total) by mouth every 12 (twelve) hours. 10 tablet 0    dronabinol (MARINOL) 2.5 MG capsule Take 1 capsule (2.5 mg total) by mouth once daily. 30 capsule 0    eltrombopag (PROMACTA) 50 MG Tab Take 1 tablet (50 mg total) by mouth once daily. 30 tablet 11    ergocalciferol (VITAMIN D2) 50,000 unit Cap take 1 capsule by mouth every 7 days 4 capsule 2    famotidine (PEPCID) 20 MG tablet Take 1 tablet (20 mg total) by mouth every evening. 30 tablet 11    folic acid (FOLVITE) 1 MG tablet Take 1 tablet (1 mg total) by mouth once daily. 30 tablet 2    levETIRAcetam (KEPPRA) 500 MG Tab Take 1 tablet (500 mg total) by mouth 2 (two) times daily. 60 tablet 11    levothyroxine (SYNTHROID) 75 MCG tablet Take 1 tablet (75 mcg total) by mouth before breakfast. 30 tablet 11    mirtazapine (REMERON) 7.5 MG Tab Take 1 tablet (7.5 mg total) by mouth every evening. 30 tablet 2    ondansetron (ZOFRAN-ODT) 8 MG TbDL Take 1 tablet (8 mg total) by mouth every 8 (eight) hours as needed (nausea). 30 tablet 1    predniSONE (DELTASONE) 20 MG tablet Take daily by mouth daily: 60mg 6/4-6/10, 50mg (2.5 tab) 6/11-6/17, 40mg (2 tab) 6/18-6/24, 30mg (1.5 tab) 6/25-7/1, 20 mg 7/2/18 75 tablet 11    sevelamer carbonate (RENVELA) 800 mg Tab Take 2 tablets (1,600 mg total) by mouth 3 (three) times daily with meals. 180 tablet 11    sirolimus (RAPAMUNE) 1 MG Tab Take 1 tablet (1 mg total) by  mouth once daily. 30 tablet 11    sirolimus 0.5 mg Tab Take 1 tablet (0.5 mg total) by mouth once daily. Patient to take a total of 1.5mg dose daily ( One 1mg tablet, and one 0.5 tablet) 30 tablet 11    sodium bicarbonate 650 MG tablet Take 1 tablet (650 mg total) by mouth 2 (two) times daily. 60 tablet 11    torsemide (DEMADEX) 20 MG Tab Take 3 tablets (60 mg total) by mouth once daily. 90 tablet 2    ursodiol (ACTIGALL) 300 mg capsule Take 1 capsule (300 mg total) by mouth 2 (two) times daily. 60 capsule 11     No current facility-administered medications on file prior to encounter.      Allergies:  Bactrim [sulfamethoxazole-trimethoprim]    Medical ROS:  See H&P dated 6/29/18 for ROS.     OBJECTIVE:     Vital Signs (Most Recent):  There were no vitals filed for this visit.    Mental Status Exam:  Appearance: casually dressed, thin & gaunt looking  Behavior/Cooperation: cooperative, child-like  Speech: normal tone, normal rate, normal pitch, normal volume  Mood: euthymic  Affect: Mood congruent  Thought Process: logical  Thought Content: normal, no suicidality, no homicidality, delusions, or paranoia  Orientation: grossly intact  Memory: Grossly intact  Attention Span/Concentration: Normal  Cognition: grossly intact  Insight: fair  Judgment: poor    Laboratory:  Recent Results (from the past 168 hour(s))   POCT BREATH ALCOHOL TEST    Collection Time: 07/05/18 11:20 AM   Result Value Ref Range    Breath Alcohol 0.000    Toxicology screen, urine    Collection Time: 07/06/18 10:51 AM   Result Value Ref Range    Alcohol, Urine <10 <10 mg/dL    Benzodiazepines Negative     Methadone metabolites Negative     Cocaine (Metab.) Negative     Opiate Scrn, Ur Negative     Barbiturate Screen, Ur Negative     Amphetamine Screen, Ur Negative     THC Negative     Phencyclidine Negative     Creatinine, Random Ur 19.0 (L) 23.0 - 375.0 mg/dL    Toxicology Information SEE COMMENT    POCT BREATH ALCOHOL TEST    Collection Time:  07/06/18 10:52 AM   Result Value Ref Range    Breath Alcohol 0.000    CBC auto differential    Collection Time: 07/09/18  9:40 AM   Result Value Ref Range    WBC 5.96 3.90 - 12.70 K/uL    RBC 3.11 (L) 4.60 - 6.20 M/uL    Hemoglobin 9.7 (L) 14.0 - 18.0 g/dL    Hematocrit 31.0 (L) 40.0 - 54.0 %     (H) 82 - 98 fL    MCH 31.2 (H) 27.0 - 31.0 pg    MCHC 31.3 (L) 32.0 - 36.0 g/dL    RDW 14.5 11.5 - 14.5 %    Platelets 36 (LL) 150 - 350 K/uL    MPV 11.9 9.2 - 12.9 fL    Immature Granulocytes 1.7 (H) 0.0 - 0.5 %    Gran # (ANC) 4.2 1.8 - 7.7 K/uL    Immature Grans (Abs) 0.10 (H) 0.00 - 0.04 K/uL    Lymph # 1.0 1.0 - 4.8 K/uL    Mono # 0.7 0.3 - 1.0 K/uL    Eos # 0.0 0.0 - 0.5 K/uL    Baso # 0.03 0.00 - 0.20 K/uL    nRBC 0 0 /100 WBC    Gran% 69.9 38.0 - 73.0 %    Lymph% 16.3 (L) 18.0 - 48.0 %    Mono% 11.1 4.0 - 15.0 %    Eosinophil% 0.5 0.0 - 8.0 %    Basophil% 0.5 0.0 - 1.9 %    Platelet Estimate Decreased (A)     Differential Method Automated    Comprehensive metabolic panel    Collection Time: 07/09/18  9:40 AM   Result Value Ref Range    Sodium 140 136 - 145 mmol/L    Potassium 4.0 3.5 - 5.1 mmol/L    Chloride 106 95 - 110 mmol/L    CO2 23 23 - 29 mmol/L    Glucose 78 70 - 110 mg/dL    BUN, Bld 76 (H) 6 - 20 mg/dL    Creatinine 2.8 (H) 0.5 - 1.4 mg/dL    Calcium 9.5 8.7 - 10.5 mg/dL    Total Protein 6.7 6.0 - 8.4 g/dL    Albumin 3.1 (L) 3.5 - 5.2 g/dL    Total Bilirubin 0.6 0.1 - 1.0 mg/dL    Alkaline Phosphatase 173 (H) 55 - 135 U/L    AST 28 10 - 40 U/L    ALT 32 10 - 44 U/L    Anion Gap 11 8 - 16 mmol/L    eGFR if African American 33.9 (A) >60 mL/min/1.73 m^2    eGFR if non  29.4 (A) >60 mL/min/1.73 m^2   Sirolimus level    Collection Time: 07/09/18  9:40 AM   Result Value Ref Range    Sirolimus Lvl 2.6 (L) 4.0 - 20.0 ng/mL   Bilirubin, direct    Collection Time: 07/09/18  9:40 AM   Result Value Ref Range    Bilirubin, Direct 0.4 (H) 0.1 - 0.3 mg/dL   Toxicology screen, urine    Collection  Time: 07/09/18 11:58 AM   Result Value Ref Range    Alcohol, Urine <10 <10 mg/dL    Benzodiazepines Negative     Methadone metabolites Negative     Cocaine (Metab.) Negative     Opiate Scrn, Ur Negative     Barbiturate Screen, Ur Negative     Amphetamine Screen, Ur Negative     THC Negative     Phencyclidine Negative     Creatinine, Random Ur 53.0 23.0 - 375.0 mg/dL    Toxicology Information SEE COMMENT    POCT BREATH ALCOHOL TEST    Collection Time: 07/09/18 12:05 PM   Result Value Ref Range    Breath Alcohol 0.000    POCT BREATH ALCOHOL TEST    Collection Time: 07/10/18 10:39 AM   Result Value Ref Range    Breath Alcohol 0.000    Toxicology screen, urine    Collection Time: 07/11/18 11:55 AM   Result Value Ref Range    Alcohol, Urine <10 <10 mg/dL    Benzodiazepines Negative     Methadone metabolites Negative     Cocaine (Metab.) Negative     Opiate Scrn, Ur Negative     Barbiturate Screen, Ur Negative     Amphetamine Screen, Ur Negative     THC Negative     Phencyclidine Negative     Creatinine, Random Ur 49.0 23.0 - 375.0 mg/dL    Toxicology Information SEE COMMENT    POCT BREATH ALCOHOL TEST    Collection Time: 07/11/18 11:55 AM   Result Value Ref Range    Breath Alcohol 0.000    CBC auto differential    Collection Time: 07/11/18 12:29 PM   Result Value Ref Range    WBC 5.87 3.90 - 12.70 K/uL    RBC 3.42 (L) 4.60 - 6.20 M/uL    Hemoglobin 10.9 (L) 14.0 - 18.0 g/dL    Hematocrit 33.6 (L) 40.0 - 54.0 %    MCV 98 82 - 98 fL    MCH 31.9 (H) 27.0 - 31.0 pg    MCHC 32.4 32.0 - 36.0 g/dL    RDW 14.7 (H) 11.5 - 14.5 %    Platelets 46 (L) 150 - 350 K/uL    MPV 11.8 9.2 - 12.9 fL    Immature Granulocytes CANCELED 0.0 - 0.5 %    Immature Grans (Abs) CANCELED 0.00 - 0.04 K/uL    nRBC 0 0 /100 WBC    Gran% 81.0 (H) 38.0 - 73.0 %    Lymph% 14.0 (L) 18.0 - 48.0 %    Mono% 1.0 (L) 4.0 - 15.0 %    Eosinophil% 0.0 0.0 - 8.0 %    Basophil% 0.0 0.0 - 1.9 %    Bands 4.0 %    Platelet Estimate Decreased (A)     Aniso Slight     Poik  Slight     Poly Occasional     Hypo Occasional     Ovalocytes Occasional     Differential Method Manual    Comprehensive metabolic panel    Collection Time: 07/11/18 12:29 PM   Result Value Ref Range    Sodium 141 136 - 145 mmol/L    Potassium 4.2 3.5 - 5.1 mmol/L    Chloride 106 95 - 110 mmol/L    CO2 21 (L) 23 - 29 mmol/L    Glucose 112 (H) 70 - 110 mg/dL    BUN, Bld 65 (H) 6 - 20 mg/dL    Creatinine 2.7 (H) 0.5 - 1.4 mg/dL    Calcium 9.1 8.7 - 10.5 mg/dL    Total Protein 7.7 6.0 - 8.4 g/dL    Albumin 3.5 3.5 - 5.2 g/dL    Total Bilirubin 0.7 0.1 - 1.0 mg/dL    Alkaline Phosphatase 194 (H) 55 - 135 U/L    AST 34 10 - 40 U/L    ALT 38 10 - 44 U/L    Anion Gap 14 8 - 16 mmol/L    eGFR if African American 35.5 (A) >60 mL/min/1.73 m^2    eGFR if non  30.7 (A) >60 mL/min/1.73 m^2   Troponin I #1    Collection Time: 07/11/18 12:29 PM   Result Value Ref Range    Troponin I 0.010 0.000 - 0.026 ng/mL   B-Type natriuretic peptide (BNP)    Collection Time: 07/11/18 12:29 PM   Result Value Ref Range    BNP 91 0 - 99 pg/mL   Type & Screen    Collection Time: 07/11/18 12:29 PM   Result Value Ref Range    Group & Rh O POS     Indirect Claudia NEG    Phosphorus    Collection Time: 07/11/18 12:29 PM   Result Value Ref Range    Phosphorus 4.3 2.7 - 4.5 mg/dL   Magnesium    Collection Time: 07/11/18 12:29 PM   Result Value Ref Range    Magnesium 2.0 1.6 - 2.6 mg/dL   Lactic acid, plasma    Collection Time: 07/11/18 12:53 PM   Result Value Ref Range    Lactate (Lactic Acid) 1.5 0.5 - 2.2 mmol/L   Blood culture #1 **CANNOT BE ORDERED STAT**    Collection Time: 07/11/18 12:53 PM   Result Value Ref Range    Blood Culture, Routine No Growth to date    Urinalysis, Reflex to Urine Culture Urine, Clean Catch    Collection Time: 07/11/18  1:27 PM   Result Value Ref Range    Specimen UA Urine, Clean Catch     Color, UA Straw Yellow, Straw, Martha    Appearance, UA Clear Clear    pH, UA 7.0 5.0 - 8.0    Specific Gravity, UA 1.005  1.005 - 1.030    Protein, UA Negative Negative    Glucose, UA Negative Negative    Ketones, UA Negative Negative    Bilirubin (UA) Negative Negative    Occult Blood UA Negative Negative    Nitrite, UA Negative Negative    Urobilinogen, UA Negative <2.0 EU/dL    Leukocytes, UA Negative Negative     ASSESSMENT:     Alcohol use disorder, severe, in early remission  Opioid use disorder, mild  Unspecified depression (MDD vs. Adjustment disorder)    PLAN:     · Continue patient on ABU protocol.  · Breathalyzer and urine toxicology daily.  · VS daily x3 days.  · Patient counseled on abstinence from alcohol, opiates (other than Suboxone), and all other drugs of abuse.    Medications: Continue current medications. The risks and benefits of medication were discussed with the patient.  · Continue Suboxone 4 mg qAM and 2 mg QHS for opiate use disorder and pain    · Continue Remeron 7.5 mg QHS for sleep, mood, and anxiety      Status: Continue treatment on ABU    Patient's Intervention Response: accepting    Case discussed with Blank Umana MD.    Singh Hall,   [facility]   PGY-2

## 2018-07-12 NOTE — PROGRESS NOTES
"Group Psychotherapy (PhD/LCSW)    Site: [facility]    Clinical status of patient: Intensive Outpatient Program (IOP)    Date: 7/12/2018    Group Focus: Stress Management       Length of service: 04558 - 45-50 minutes    Number of patients in attendance: 12    Referred by: Addictive Behavior Unit Treatment Team    Target symptoms: Alcohol Abuse    Patient's response to treatment: Active Listening and Self-disclosure    Progress toward goals: Progressing adequately    Interval History: Discussed the way 12-step principles principles can be helpful in stress management in many situations (not just addiction issues). Illustrated the application of the "first three steps of recovery" to coping with life crises and with relationship issues.     Diagnosis: alcohol use disorder, severe, in early remission    Plan: Continue treatment on ABU        "

## 2018-07-13 ENCOUNTER — PATIENT MESSAGE (OUTPATIENT)
Dept: TRANSPLANT | Facility: CLINIC | Age: 28
End: 2018-07-13

## 2018-07-13 ENCOUNTER — TELEPHONE (OUTPATIENT)
Dept: TRANSPLANT | Facility: CLINIC | Age: 28
End: 2018-07-13

## 2018-07-13 ENCOUNTER — HOSPITAL ENCOUNTER (OUTPATIENT)
Dept: PSYCHIATRY | Facility: HOSPITAL | Age: 28
Discharge: HOME | End: 2018-07-13
Attending: PSYCHIATRY & NEUROLOGY
Payer: COMMERCIAL

## 2018-07-13 VITALS — HEART RATE: 103 BPM | RESPIRATION RATE: 16 BRPM | DIASTOLIC BLOOD PRESSURE: 78 MMHG | SYSTOLIC BLOOD PRESSURE: 132 MMHG

## 2018-07-13 DIAGNOSIS — F11.29 OPIOID DEPENDENCE WITH OPIOID-INDUCED DISORDER: Primary | ICD-10-CM

## 2018-07-13 DIAGNOSIS — R45.89 ANXIETY ABOUT HEALTH: ICD-10-CM

## 2018-07-13 DIAGNOSIS — F10.21 ALCOHOL USE DISORDER, SEVERE, IN EARLY REMISSION: Primary | ICD-10-CM

## 2018-07-13 LAB
AMPHET+METHAMPHET UR QL: NEGATIVE
BARBITURATES UR QL SCN>200 NG/ML: NEGATIVE
BENZODIAZ UR QL SCN>200 NG/ML: NEGATIVE
BREATH ALCOHOL: 0
BZE UR QL SCN: NEGATIVE
CANNABINOIDS UR QL SCN: NEGATIVE
CREAT UR-MCNC: 50 MG/DL
ETHANOL UR-MCNC: <10 MG/DL
METHADONE UR QL SCN>300 NG/ML: NEGATIVE
OPIATES UR QL SCN: NEGATIVE
PCP UR QL SCN>25 NG/ML: NEGATIVE
TOXICOLOGY INFORMATION: NORMAL

## 2018-07-13 PROCEDURE — 90853 GROUP PSYCHOTHERAPY: CPT | Mod: NTX

## 2018-07-13 PROCEDURE — 90853 GROUP PSYCHOTHERAPY: CPT | Performed by: SOCIAL WORKER

## 2018-07-13 PROCEDURE — 99232 SBSQ HOSP IP/OBS MODERATE 35: CPT | Mod: ,,, | Performed by: PSYCHIATRY & NEUROLOGY

## 2018-07-13 PROCEDURE — 80307 DRUG TEST PRSMV CHEM ANLYZR: CPT | Mod: NTX

## 2018-07-13 PROCEDURE — 90853 GROUP PSYCHOTHERAPY: CPT | Mod: ,,, | Performed by: PSYCHOLOGIST

## 2018-07-13 RX ORDER — BUPRENORPHINE AND NALOXONE 2; .5 MG/1; MG/1
FILM, SOLUBLE BUCCAL; SUBLINGUAL
Qty: 90 EACH | Refills: 0 | Status: SHIPPED | OUTPATIENT
Start: 2018-07-13 | End: 2018-08-14 | Stop reason: ALTCHOICE

## 2018-07-13 NOTE — PROGRESS NOTES
Group Psychotherapy (PhD/LCSW)    Site: [facility]    Clinical status of patient: Intensive Outpatient Program (IOP)    Date: 7/13/2018    Group Focus: Stress Management    Length of service: 66175 - 45-50 minutes    Number of patients in attendance: 12    Referred by: Addictive Behavior Unit Treatment Team    Target symptoms: Alcohol Abuse    Patient's response to treatment: Active Listening    Progress toward goals: Progressing adequately    Interval History: Group learned mindfulness techniques (breath, senses, defusion imagery) to improve present-moment awareness, impulsive behavior tendencies, and tolerance of various emotional states.    Diagnosis: Alcohol Use Disorder, in early remission    Plan: Continue treatment on ABU

## 2018-07-13 NOTE — TELEPHONE ENCOUNTER
[employer] Appeal was denied and we will be assisting the patient in applying to the  for assistance. Sending a staff message to Dr Rodriguez and application prescription for his signature.

## 2018-07-13 NOTE — TELEPHONE ENCOUNTER
----- Message from Blank Umana MD sent at 7/13/2018  3:15 PM CDT -----  Regarding: RE: Patient appointment with Hepatology  Thanks for keeping us in the loop.  ----- Message -----  From: Lubna Agustin RN  Sent: 7/13/2018   9:40 AM  To: Reid Bills, PhD, Nino Villarreal, [facility], #  Subject: Patient appointment with Hepatology              Randee,      Mr. Diana has been scheduled to see Dr. Rogers in Hepatology on July 23rd at 0800. I wanted to notify you because he will be late to the ABU that day. Also, Transplant Nephrology will be setting up an appointment soon. They will communicate with you once appointment made. Any questions, please call me at [phone].    Thank You,  Lubna Agustin RN, BSN, CCTC  Post Liver Transplant Coordinator

## 2018-07-13 NOTE — TELEPHONE ENCOUNTER
----- Message from Alin López Jr., MD sent at 7/13/2018 11:24 AM CDT -----  Regarding: RE: i remembered....   His CT pelvis looks good, iliac arteries are normal.      ----- Message -----  From: Frances Napier  Sent: 7/12/2018   5:29 PM  To: Alin López Jr., MD  Subject: i remembered....                                 Can you look over his chart and see if there is anything else needed? We just got Finacial clearance and will book him for a RR visit. I will have to coordinate with ABU for him to see us.

## 2018-07-13 NOTE — PLAN OF CARE
07/13/18 1400   Activity/Group Therapy Checklist   Group Relapse Prevention   Attendance Attended   Follows Direction Followed directions   Group Interactions/Observations Interacted appropriately   Affect/Mood Range Normal range   Affect/Mood Display Appropriate   Goal Progression Progressing

## 2018-07-13 NOTE — PLAN OF CARE
07/13/18 1000   Activity/Group Therapy Checklist   Group Relapse Prevention  (Step Work )   Attendance Attended   Follows Direction Followed directions   Group Interactions/Observations Interacted appropriately   Affect/Mood Range Normal range   Affect/Mood Display Appropriate   Goal Progression Progressing

## 2018-07-13 NOTE — PROGRESS NOTES
2018 5:32 PM  Name: Jhonny Diana  : 1990  Start Date: 18    ABU Intensive Outpatient Program   Progress Note    Status: Intensive Outpatient Program (IOP)    HPI:  Jhonny Diana is a 27 y.o. male who presents for admission to ABU with principal problem of opioid use, as well as hx of alcohol use disorder.      Pt is s/p liver transplant in 10/2017 due to acute alcoholic hepatitis. He has a hx of heavy alcohol use since his teens. He required emergent liver transplantation prior to being able to undergo any type of substance abuse treatment. It was recommended that pt attend the ABU program after he received his liver transplant as he was deemed to be very high risk for relapse post-transplant. However pt never followed up in the ABU due to a number of post-op medical complications including ESRD requiring HD (Mon/Fri). In 2018 the addiction psychiatry team was consulted while pt was admitted to the hospital due to concerns for opioid addiction. Pt was started on Suboxone and again recommended to f/u with the ABU for substance abuse treatment.      Today pt reports that he is feeling well physically. He confirms hx of alcohol use since the age of 18. He escalated to drinking a fifth of liquor daily in his early to mid twenties. He attempted to cut back a number of times but was unsuccessful. He has attended AA meetings in the past but has never been to a formal rehab program. He minimizes the effects that alcohol had in his life as he states that he mostly drank by himself, so it did not affect his relationships. He does admit that alcohol was a problem for him however, and recognizes the need for continued life long sobriety following his liver transplant.      Regarding his opiate use, pt reports using 10 mg of oxycontin every 4 hours as was prescribed to him. He reports that he began getting prescriptions for opiates a few months prior to his transplant, and continued to use them until this  "month when he was transitioned to Suboxone. He denies ever buying opiates or using meds which were not prescribed to him. He denies ever injecting or snorting them. He does report feeling better both physically and mentally on Suboxone. He reports feeling like his head is clearer and pain is better controlled. He denies any current side effects from Suboxone or cravings to use other opiates.      Last drink/use: Last use of alcohol 9/2017. Last use of narcotic pain medications (other than Suboxone) 6/14/18  Average consumption: drank a fifth of liquor daily. Used Oxycontin 10 mg PO q4h  Withdrawal: yes, hx of tremors and anxiety from alcohol withdrawal. No hx of complicated alcohol withdrawal or opiate withdrawal  Spouse/partner consumption: n/a  Rehab/Detox: no  AA/NA: yes, has been to AA before. Went for approx 1 year prior to his transplant. States "not a fan" due to Faith aspect    SUBJECTIVE:     Interval Hx:  Reports feeling tired all through the day today. States that he was not able to get to sleep until 1 AM last night.  States that he was laying awake for a while trying to get to sleep. States that he did not take dose of Remeron because he had not been taking it recently and did not want to deal with feeling groggy in the morning.  Reports that he will be going to his regularly scheduled HD appointment this evening.  Describes his mood as "fairly good".  Will be having an upcoming appointment with Nephrology and transplant doctors to discuss potential kidney transplant. Discussed patient's Suboxone and he reports that it has still been working well at the current dose to control his pain.     Medication Side Effects: None  Cravings: Denies  No other acute psychiatric issues reported at this time.    Scheduled Meds:   Current Outpatient Prescriptions on File Prior to Encounter   Medication Sig Dispense Refill    amLODIPine (NORVASC) 5 MG tablet Take 1 tablet (5 mg total) by mouth once daily. 30 tablet " 11    bisacodyl (DULCOLAX) 5 mg EC tablet Take 2 tablets (10 mg total) by mouth daily as needed for Constipation. 60 tablet 5    buprenorphine-naloxone 2-0.5 mg (SUBOXONE) 2-0.5 mg Film Take 2 film sublingually in the morning and 1 film sublingually in the evening 42 each 0    ciprofloxacin HCl (CIPRO) 250 MG tablet Take 1 tablet (250 mg total) by mouth every 12 (twelve) hours. 10 tablet 0    dronabinol (MARINOL) 2.5 MG capsule Take 1 capsule (2.5 mg total) by mouth once daily. 30 capsule 0    eltrombopag (PROMACTA) 50 MG Tab Take 1 tablet (50 mg total) by mouth once daily. 30 tablet 11    ergocalciferol (VITAMIN D2) 50,000 unit Cap take 1 capsule by mouth every 7 days 4 capsule 2    famotidine (PEPCID) 20 MG tablet Take 1 tablet (20 mg total) by mouth every evening. 30 tablet 11    folic acid (FOLVITE) 1 MG tablet Take 1 tablet (1 mg total) by mouth once daily. 30 tablet 2    levETIRAcetam (KEPPRA) 500 MG Tab Take 1 tablet (500 mg total) by mouth 2 (two) times daily. 60 tablet 11    levothyroxine (SYNTHROID) 75 MCG tablet Take 1 tablet (75 mcg total) by mouth before breakfast. 30 tablet 11    mirtazapine (REMERON) 7.5 MG Tab Take 1 tablet (7.5 mg total) by mouth every evening. 30 tablet 2    ondansetron (ZOFRAN-ODT) 8 MG TbDL Take 1 tablet (8 mg total) by mouth every 8 (eight) hours as needed (nausea). 30 tablet 1    predniSONE (DELTASONE) 20 MG tablet Take daily by mouth daily: 60mg 6/4-6/10, 50mg (2.5 tab) 6/11-6/17, 40mg (2 tab) 6/18-6/24, 30mg (1.5 tab) 6/25-7/1, 20 mg 7/2/18 75 tablet 11    sevelamer carbonate (RENVELA) 800 mg Tab Take 2 tablets (1,600 mg total) by mouth 3 (three) times daily with meals. 180 tablet 11    sirolimus (RAPAMUNE) 1 MG Tab Take 1 tablet (1 mg total) by mouth once daily. 30 tablet 11    sirolimus 0.5 mg Tab Take 1 tablet (0.5 mg total) by mouth once daily. Patient to take a total of 1.5mg dose daily ( One 1mg tablet, and one 0.5 tablet) 30 tablet 11    sodium  "bicarbonate 650 MG tablet Take 1 tablet (650 mg total) by mouth 2 (two) times daily. 60 tablet 11    torsemide (DEMADEX) 20 MG Tab Take 3 tablets (60 mg total) by mouth once daily. 90 tablet 2    ursodiol (ACTIGALL) 300 mg capsule Take 1 capsule (300 mg total) by mouth 2 (two) times daily. 60 capsule 11     No current facility-administered medications on file prior to encounter.      Allergies:  Bactrim [sulfamethoxazole-trimethoprim]    Medical ROS:  See H&P dated 6/29/18 for ROS.     OBJECTIVE:     Vital Signs (Most Recent):  Vitals:    07/13/18 1039   BP: 132/78   Pulse: 103   Resp: 16       Mental Status Exam:  Appearance: casually dressed, thin, using cane for ambulation  Behavior/Cooperation: cooperative  Speech: normal tone, normal rate, normal pitch, normal volume  Mood: "fairly good"  Affect: Mood congruent  Thought Process: logical  Thought Content: normal, no suicidality, no homicidality, delusions, or paranoia  Orientation: grossly intact  Memory: Grossly intact  Attention Span/Concentration: Normal  Cognition: grossly intact  Insight: fair  Judgment: limited    Laboratory:  Recent Results (from the past 168 hour(s))   CBC auto differential    Collection Time: 07/09/18  9:40 AM   Result Value Ref Range    WBC 5.96 3.90 - 12.70 K/uL    RBC 3.11 (L) 4.60 - 6.20 M/uL    Hemoglobin 9.7 (L) 14.0 - 18.0 g/dL    Hematocrit 31.0 (L) 40.0 - 54.0 %     (H) 82 - 98 fL    MCH 31.2 (H) 27.0 - 31.0 pg    MCHC 31.3 (L) 32.0 - 36.0 g/dL    RDW 14.5 11.5 - 14.5 %    Platelets 36 (LL) 150 - 350 K/uL    MPV 11.9 9.2 - 12.9 fL    Immature Granulocytes 1.7 (H) 0.0 - 0.5 %    Gran # (ANC) 4.2 1.8 - 7.7 K/uL    Immature Grans (Abs) 0.10 (H) 0.00 - 0.04 K/uL    Lymph # 1.0 1.0 - 4.8 K/uL    Mono # 0.7 0.3 - 1.0 K/uL    Eos # 0.0 0.0 - 0.5 K/uL    Baso # 0.03 0.00 - 0.20 K/uL    nRBC 0 0 /100 WBC    Gran% 69.9 38.0 - 73.0 %    Lymph% 16.3 (L) 18.0 - 48.0 %    Mono% 11.1 4.0 - 15.0 %    Eosinophil% 0.5 0.0 - 8.0 %    " Basophil% 0.5 0.0 - 1.9 %    Platelet Estimate Decreased (A)     Differential Method Automated    Comprehensive metabolic panel    Collection Time: 07/09/18  9:40 AM   Result Value Ref Range    Sodium 140 136 - 145 mmol/L    Potassium 4.0 3.5 - 5.1 mmol/L    Chloride 106 95 - 110 mmol/L    CO2 23 23 - 29 mmol/L    Glucose 78 70 - 110 mg/dL    BUN, Bld 76 (H) 6 - 20 mg/dL    Creatinine 2.8 (H) 0.5 - 1.4 mg/dL    Calcium 9.5 8.7 - 10.5 mg/dL    Total Protein 6.7 6.0 - 8.4 g/dL    Albumin 3.1 (L) 3.5 - 5.2 g/dL    Total Bilirubin 0.6 0.1 - 1.0 mg/dL    Alkaline Phosphatase 173 (H) 55 - 135 U/L    AST 28 10 - 40 U/L    ALT 32 10 - 44 U/L    Anion Gap 11 8 - 16 mmol/L    eGFR if African American 33.9 (A) >60 mL/min/1.73 m^2    eGFR if non  29.4 (A) >60 mL/min/1.73 m^2   Sirolimus level    Collection Time: 07/09/18  9:40 AM   Result Value Ref Range    Sirolimus Lvl 2.6 (L) 4.0 - 20.0 ng/mL   Bilirubin, direct    Collection Time: 07/09/18  9:40 AM   Result Value Ref Range    Bilirubin, Direct 0.4 (H) 0.1 - 0.3 mg/dL   Toxicology screen, urine    Collection Time: 07/09/18 11:58 AM   Result Value Ref Range    Alcohol, Urine <10 <10 mg/dL    Benzodiazepines Negative     Methadone metabolites Negative     Cocaine (Metab.) Negative     Opiate Scrn, Ur Negative     Barbiturate Screen, Ur Negative     Amphetamine Screen, Ur Negative     THC Negative     Phencyclidine Negative     Creatinine, Random Ur 53.0 23.0 - 375.0 mg/dL    Toxicology Information SEE COMMENT    POCT BREATH ALCOHOL TEST    Collection Time: 07/09/18 12:05 PM   Result Value Ref Range    Breath Alcohol 0.000    POCT BREATH ALCOHOL TEST    Collection Time: 07/10/18 10:39 AM   Result Value Ref Range    Breath Alcohol 0.000    Toxicology screen, urine    Collection Time: 07/11/18 11:55 AM   Result Value Ref Range    Alcohol, Urine <10 <10 mg/dL    Benzodiazepines Negative     Methadone metabolites Negative     Cocaine (Metab.) Negative     Opiate  Scrn, Ur Negative     Barbiturate Screen, Ur Negative     Amphetamine Screen, Ur Negative     THC Negative     Phencyclidine Negative     Creatinine, Random Ur 49.0 23.0 - 375.0 mg/dL    Toxicology Information SEE COMMENT    POCT BREATH ALCOHOL TEST    Collection Time: 07/11/18 11:55 AM   Result Value Ref Range    Breath Alcohol 0.000    CBC auto differential    Collection Time: 07/11/18 12:29 PM   Result Value Ref Range    WBC 5.87 3.90 - 12.70 K/uL    RBC 3.42 (L) 4.60 - 6.20 M/uL    Hemoglobin 10.9 (L) 14.0 - 18.0 g/dL    Hematocrit 33.6 (L) 40.0 - 54.0 %    MCV 98 82 - 98 fL    MCH 31.9 (H) 27.0 - 31.0 pg    MCHC 32.4 32.0 - 36.0 g/dL    RDW 14.7 (H) 11.5 - 14.5 %    Platelets 46 (L) 150 - 350 K/uL    MPV 11.8 9.2 - 12.9 fL    Immature Granulocytes CANCELED 0.0 - 0.5 %    Immature Grans (Abs) CANCELED 0.00 - 0.04 K/uL    nRBC 0 0 /100 WBC    Gran% 81.0 (H) 38.0 - 73.0 %    Lymph% 14.0 (L) 18.0 - 48.0 %    Mono% 1.0 (L) 4.0 - 15.0 %    Eosinophil% 0.0 0.0 - 8.0 %    Basophil% 0.0 0.0 - 1.9 %    Bands 4.0 %    Platelet Estimate Decreased (A)     Aniso Slight     Poik Slight     Poly Occasional     Hypo Occasional     Ovalocytes Occasional     Differential Method Manual    Comprehensive metabolic panel    Collection Time: 07/11/18 12:29 PM   Result Value Ref Range    Sodium 141 136 - 145 mmol/L    Potassium 4.2 3.5 - 5.1 mmol/L    Chloride 106 95 - 110 mmol/L    CO2 21 (L) 23 - 29 mmol/L    Glucose 112 (H) 70 - 110 mg/dL    BUN, Bld 65 (H) 6 - 20 mg/dL    Creatinine 2.7 (H) 0.5 - 1.4 mg/dL    Calcium 9.1 8.7 - 10.5 mg/dL    Total Protein 7.7 6.0 - 8.4 g/dL    Albumin 3.5 3.5 - 5.2 g/dL    Total Bilirubin 0.7 0.1 - 1.0 mg/dL    Alkaline Phosphatase 194 (H) 55 - 135 U/L    AST 34 10 - 40 U/L    ALT 38 10 - 44 U/L    Anion Gap 14 8 - 16 mmol/L    eGFR if African American 35.5 (A) >60 mL/min/1.73 m^2    eGFR if non  30.7 (A) >60 mL/min/1.73 m^2   Troponin I #1    Collection Time: 07/11/18 12:29 PM    Result Value Ref Range    Troponin I 0.010 0.000 - 0.026 ng/mL   B-Type natriuretic peptide (BNP)    Collection Time: 07/11/18 12:29 PM   Result Value Ref Range    BNP 91 0 - 99 pg/mL   Type & Screen    Collection Time: 07/11/18 12:29 PM   Result Value Ref Range    Group & Rh O POS     Indirect Claudia NEG    Phosphorus    Collection Time: 07/11/18 12:29 PM   Result Value Ref Range    Phosphorus 4.3 2.7 - 4.5 mg/dL   Magnesium    Collection Time: 07/11/18 12:29 PM   Result Value Ref Range    Magnesium 2.0 1.6 - 2.6 mg/dL   Lactic acid, plasma    Collection Time: 07/11/18 12:53 PM   Result Value Ref Range    Lactate (Lactic Acid) 1.5 0.5 - 2.2 mmol/L   Blood culture #1 **CANNOT BE ORDERED STAT**    Collection Time: 07/11/18 12:53 PM   Result Value Ref Range    Blood Culture, Routine No Growth to date     Blood Culture, Routine No Growth to date    Urinalysis, Reflex to Urine Culture Urine, Clean Catch    Collection Time: 07/11/18  1:27 PM   Result Value Ref Range    Specimen UA Urine, Clean Catch     Color, UA Straw Yellow, Straw, Martha    Appearance, UA Clear Clear    pH, UA 7.0 5.0 - 8.0    Specific Gravity, UA 1.005 1.005 - 1.030    Protein, UA Negative Negative    Glucose, UA Negative Negative    Ketones, UA Negative Negative    Bilirubin (UA) Negative Negative    Occult Blood UA Negative Negative    Nitrite, UA Negative Negative    Urobilinogen, UA Negative <2.0 EU/dL    Leukocytes, UA Negative Negative   POCT BREATH ALCOHOL TEST    Collection Time: 07/12/18 11:30 AM   Result Value Ref Range    Breath Alcohol 0.000    POCT BREATH ALCOHOL TEST    Collection Time: 07/13/18 10:39 AM   Result Value Ref Range    Breath Alcohol 0.000      ASSESSMENT:     Alcohol use disorder, severe, in early remission  Opioid use disorder, mild  Unspecified depression (MDD vs. Adjustment disorder)    PLAN:     · Continue patient on ABU protocol.  · Breathalyzer and urine toxicology daily.  · VS daily x3 days.  · Patient counseled on  abstinence from alcohol, opiates (other than Suboxone), and all other drugs of abuse.    Medications: Continue current medications. The risks and benefits of medication were discussed with the patient.  · Continue Suboxone 4 mg qAM and 2 mg QHS for opiate use disorder and pain    · Continue Remeron 7.5 mg QHS PRN      Status: Continue treatment on ABU    Patient's Intervention Response: accepting    Case discussed with Blank Umana MD.    Rajesh Langston MD  [facility]  PGY-4

## 2018-07-13 NOTE — PROGRESS NOTES
Group Psychotherapy (PhD/LCSW)    Site: [facility]    Clinical status of patient: Intensive Outpatient Program (IOP)    Date: 7/13/2018    Group Focus:  Psychodynamic Group Therapy    Length of service: 19766 - 45-50 minutes    Number of patients in attendance: 8    Referred by: Addictive Behavior Unit Treatment Team    Target symptoms: Alcohol Abuse    Patient's response to treatment: Active Listening; Self-disclosure     Progress toward goals: Progressing adequately    Interval History:  Pt discussed his relief and hope when he found out today that he has been given the ok for a kidney transplant.     Diagnosis: Alcohol Use Disorder, in early remission    Plan: Continue treatment on ABU

## 2018-07-13 NOTE — PROGRESS NOTES
Group Psychotherapy (MD)     Site: [facility]     Clinical status of patient: Intensive Outpatient Program (IOP)     Date: 7/11/18     Group Focus: Medical and Neuropsychiatric Bases of Psychiatric Disease and Addiction: Self medication     Length of service: 63690 - 45-50 minutes     Number of patients in attendance: 10     Referred by: Addictive Behavioral Unit Treatment Team     Target symptoms: Anxiety, depression, cravings and other negative symptoms     Patient's response to treatment: Active Listening       Progress toward goals: progressing slowly    content of Therapy: Provided education about addictive circuits in the brain and relationship with other mental health issues. Explained concept of self medication and contrasted it with self care, discussed hazards of short acting quick fixes for psychiatric issues and benefits of long term sustainable solutions. Prompted examples of addictive, poor coping skills and brainstormed alternative more helpful behaviors, such as mindfulness, therapy, exercise.           Diagnosis: Alcohol use disorder, severe, in remission; opioid use disorder, moderate     Plan: Continue treatment on ABU

## 2018-07-16 ENCOUNTER — HOSPITAL ENCOUNTER (OUTPATIENT)
Dept: PSYCHIATRY | Facility: HOSPITAL | Age: 28
Discharge: HOME | End: 2018-07-16
Attending: PSYCHIATRY & NEUROLOGY
Payer: COMMERCIAL

## 2018-07-16 VITALS — RESPIRATION RATE: 16 BRPM | DIASTOLIC BLOOD PRESSURE: 70 MMHG | HEART RATE: 96 BPM | SYSTOLIC BLOOD PRESSURE: 117 MMHG

## 2018-07-16 DIAGNOSIS — F10.21 ALCOHOL USE DISORDER, SEVERE, IN EARLY REMISSION: Primary | ICD-10-CM

## 2018-07-16 DIAGNOSIS — R45.89 ANXIETY ABOUT HEALTH: ICD-10-CM

## 2018-07-16 LAB
AMPHET+METHAMPHET UR QL: NEGATIVE
BACTERIA BLD CULT: NORMAL
BARBITURATES UR QL SCN>200 NG/ML: NEGATIVE
BENZODIAZ UR QL SCN>200 NG/ML: NEGATIVE
BREATH ALCOHOL: 0
BZE UR QL SCN: NEGATIVE
CANNABINOIDS UR QL SCN: NEGATIVE
CREAT UR-MCNC: 35 MG/DL
ETHANOL UR-MCNC: <10 MG/DL
METHADONE UR QL SCN>300 NG/ML: NEGATIVE
OPIATES UR QL SCN: NEGATIVE
PCP UR QL SCN>25 NG/ML: NEGATIVE
TOXICOLOGY INFORMATION: NORMAL

## 2018-07-16 PROCEDURE — 90853 GROUP PSYCHOTHERAPY: CPT | Mod: NTX | Performed by: SOCIAL WORKER

## 2018-07-16 PROCEDURE — 90853 GROUP PSYCHOTHERAPY: CPT | Mod: ,,, | Performed by: PSYCHOLOGIST

## 2018-07-16 PROCEDURE — 90853 GROUP PSYCHOTHERAPY: CPT | Mod: NTX

## 2018-07-16 PROCEDURE — 80307 DRUG TEST PRSMV CHEM ANLYZR: CPT | Mod: NTX

## 2018-07-16 NOTE — PLAN OF CARE
07/16/18 1400   Activity/Group Therapy Checklist   Group Relapse Prevention  (first step)   Attendance Attended   Follows Direction Followed directions   Group Interactions/Observations Interacted appropriately;Alert;Supportive;Sharing  (followed directions well, appropriate humor, fair insight)   Affect/Mood Range Normal range   Affect/Mood Display Appropriate   Goal Progression Progressing

## 2018-07-16 NOTE — PLAN OF CARE
07/16/18 1000   Activity/Group Therapy Checklist   Group Educational  (healthy anger   )   Attendance Attended   Follows Direction Followed directions   Group Interactions/Observations Interacted appropriately   Affect/Mood Range Normal range   Affect/Mood Display Appropriate   Goal Progression Progressing

## 2018-07-16 NOTE — PROGRESS NOTES
Group Psychotherapy (PhD/LCSW)    Site: [facility]    Clinical status of patient: Intensive Outpatient Program (IOP)    Date: 7/16/2018    Group Focus:  Psychodynamic Group Therapy    Length of service: 48206 - 45-50 minutes    Number of patients in attendance: 8    Referred by: Addictive Behavior Unit Treatment Team    Target symptoms: Alcohol Abuse    Patient's response to treatment: Active Listening; Self-disclosure     Progress toward goals: Progressing adequately    Interval History:  Pt gave supportive feedback to a group member who was discharged today. He noted that he enjoyed a new AA meeting over the weekend that was a Step Study that focused on step 4.      Diagnosis: Alcohol Use Disorder, in early remission    Plan: Continue treatment on ABU

## 2018-07-16 NOTE — PROGRESS NOTES
Group Psychotherapy (PhD/LCSW)    Site: [facility]    Clinical status of patient: Intensive Outpatient Program (IOP)    Date: 7/16/2018    Group Focus: Communication Skills        Length of service: 47000 - 45-50 minutes    Number of patients in attendance: 12    Referred by: Addictive Behavior Unit Treatment Team    Target symptoms: Alcohol Abuse    Patient's response to treatment: Active Listening      Progress toward goals: Progressing adequately    Interval History:  Discussed basic communication skills: I-messages and Reflective Listening. Gave examples of how they are used and their value in enhancing the communication process.     Diagnosis: alcohol use disorder, severe, in early remission    Plan: Continue treatment on ABU

## 2018-07-17 ENCOUNTER — TELEPHONE (OUTPATIENT)
Dept: GASTROENTEROLOGY | Facility: CLINIC | Age: 28
End: 2018-07-17

## 2018-07-17 ENCOUNTER — HOSPITAL ENCOUNTER (OUTPATIENT)
Dept: PSYCHIATRY | Facility: HOSPITAL | Age: 28
Discharge: HOME | End: 2018-07-17
Attending: PSYCHIATRY & NEUROLOGY
Payer: COMMERCIAL

## 2018-07-17 DIAGNOSIS — F10.21 ALCOHOL USE DISORDER, SEVERE, IN EARLY REMISSION: Primary | ICD-10-CM

## 2018-07-17 DIAGNOSIS — R45.89 ANXIETY ABOUT HEALTH: ICD-10-CM

## 2018-07-17 LAB — BREATH ALCOHOL: 0

## 2018-07-17 PROCEDURE — 90853 GROUP PSYCHOTHERAPY: CPT | Mod: ,,, | Performed by: PSYCHOLOGIST

## 2018-07-17 PROCEDURE — 99232 SBSQ HOSP IP/OBS MODERATE 35: CPT | Mod: ,,, | Performed by: PSYCHIATRY & NEUROLOGY

## 2018-07-17 PROCEDURE — 90853 GROUP PSYCHOTHERAPY: CPT | Performed by: SOCIAL WORKER

## 2018-07-17 PROCEDURE — 90853 GROUP PSYCHOTHERAPY: CPT

## 2018-07-17 PROCEDURE — 90833 PSYTX W PT W E/M 30 MIN: CPT | Mod: ,,, | Performed by: PSYCHIATRY & NEUROLOGY

## 2018-07-17 NOTE — PLAN OF CARE
07/17/18 1400   Activity/Group Therapy Checklist   Group Goals/Reflection  (perception of self )   Attendance Attended   Follows Direction Followed directions   Group Interactions/Observations Interacted appropriately   Affect/Mood Range Normal range   Affect/Mood Display Appropriate   Goal Progression Progressing

## 2018-07-17 NOTE — PROGRESS NOTES
Group Psychotherapy (PhD/LCSW)    Site: [facility]    Clinical status of patient: Intensive Outpatient Program (IOP)    Date: 7/17/2018    Group Focus:  Disease Model of Addiction      Length of service: 67343 - 45-50 minutes    Number of patients in attendance: 8    Referred by: Addictive Behavior Unit Treatment Team    Target symptoms: Alcohol Abuse    Patient's response to treatment: Active Listening     Progress toward goals: Progressing adequately    Interval History:  Discussed basic neuropsychological concepts of the Disease Model of addiction and the relationship to the phenomena of addiction and recovery (eg, powerlessness, cravings, euphoric recall, tolerance, relapse, and relapse prevention). Noted the value of understanding the Disease Model for maintaining long-term sobriety. eir value pantry with small and big commitments.    Diagnosis: Alcohol Use Disorder, in early remission    Plan: Continue treatment on ABU

## 2018-07-17 NOTE — PROGRESS NOTES
Group Psychotherapy (PhD/LCSW)    Site: [facility]    Clinical status of patient: Intensive Outpatient Program (IOP)    Date: 7/17/2018    Group Focus:  ACT Group Therapy    Length of service: 85982 - 45-50 minutes    Number of patients in attendance: 11    Referred by: Addictive Behavior Unit Treatment Team    Target symptoms: Alcohol Abuse    Patient's response to treatment: Active Listening; Self-disclosure     Progress toward goals: Progressing adequately    Interval History:  Session focus was Commitment to Values.  Patients were encouraged to commit to values in each present moment in order to promote a meaningful life.  They were instructed to stock their value pantry with small and big commitments.    Diagnosis: Alcohol Use Disorder, in early remission    Plan: Continue treatment on ABU

## 2018-07-17 NOTE — TELEPHONE ENCOUNTER
[place],  This patient is scheduled to see Dr. Shine on 7/20 , Does he need to see him or [facility] endoscopist  Please advise

## 2018-07-17 NOTE — PROGRESS NOTES
Group Psychotherapy (PhD/LCSW)    Site: [facility]    Clinical status of patient: Intensive Outpatient Program (IOP)    Date: 7/17/2018    Group Focus: Psychodynamic Group Psychotherapy    Length of service: 66337 - 45-50 minutes    Number of patients in attendance: 8    Referred by: Addictive Behavior Unit Treatment Team    Target symptoms: Alcohol Abuse    Patient's response to treatment: Active Listening and Self-disclosure    Progress toward goals: Progressing adequately    Interval History: Shared her story with new group member.    Diagnosis: alcohol use disorder, severe, in early remission    Plan: Continue treatment on ABU

## 2018-07-17 NOTE — PROGRESS NOTES
2018 5:32 PM  Name: Jhonny Diana  : 1990  Start Date: 18    ABU Intensive Outpatient Program   Progress Note    Status: Intensive Outpatient Program (IOP)    HPI:  Jhonny Diana is a 27 y.o. male who presents for admission to ABU with principal problem of opioid use, as well as hx of alcohol use disorder.      Pt is s/p liver transplant in 10/2017 due to acute alcoholic hepatitis. He has a hx of heavy alcohol use since his teens. He required emergent liver transplantation prior to being able to undergo any type of substance abuse treatment. It was recommended that pt attend the ABU program after he received his liver transplant as he was deemed to be very high risk for relapse post-transplant. However pt never followed up in the ABU due to a number of post-op medical complications including ESRD requiring HD (Mon/Fri). In 2018 the addiction psychiatry team was consulted while pt was admitted to the hospital due to concerns for opioid addiction. Pt was started on Suboxone and again recommended to f/u with the ABU for substance abuse treatment.      Today pt reports that he is feeling well physically. He confirms hx of alcohol use since the age of 18. He escalated to drinking a fifth of liquor daily in his early to mid twenties. He attempted to cut back a number of times but was unsuccessful. He has attended AA meetings in the past but has never been to a formal rehab program. He minimizes the effects that alcohol had in his life as he states that he mostly drank by himself, so it did not affect his relationships. He does admit that alcohol was a problem for him however, and recognizes the need for continued life long sobriety following his liver transplant.      Regarding his opiate use, pt reports using 10 mg of oxycontin every 4 hours as was prescribed to him. He reports that he began getting prescriptions for opiates a few months prior to his transplant, and continued to use them until this  "month when he was transitioned to Suboxone. He denies ever buying opiates or using meds which were not prescribed to him. He denies ever injecting or snorting them. He does report feeling better both physically and mentally on Suboxone. He reports feeling like his head is clearer and pain is better controlled. He denies any current side effects from Suboxone or cravings to use other opiates.      Last drink/use: Last use of alcohol 9/2017. Last use of narcotic pain medications (other than Suboxone) 6/14/18  Average consumption: drank a fifth of liquor daily. Used Oxycontin 10 mg PO q4h  Withdrawal: yes, hx of tremors and anxiety from alcohol withdrawal. No hx of complicated alcohol withdrawal or opiate withdrawal  Spouse/partner consumption: n/a  Rehab/Detox: no  AA/NA: yes, has been to AA before. Went for approx 1 year prior to his transplant. States "not a fan" due to Voodoo aspect    SUBJECTIVE:     Interval Hx:  States that things went well over the weekend. States that he had his regularly scheduled HD last night and was told that his kidney function was looking better.  He states that he went to AA meeting at the [facility] last night.  States that his Suboxone has still been working well to keep his pain under control.  He denies experiencing any cravings for alcohol or opiates.  He states that it would be stupid for him to have cravings for alcohol because he has had a liver transplant. Describes his mood as pretty good. States that his mother came in to [facility]BU with him today for family meeting.     Addendum 7/17/18 at 2:45 PM.   PSYCHOTHERAPY  Site: [facility], [address]  Time: 25 minutes  Participants: Met with mother  Therapeutic Intervention Type: supportive psychotherapy, family therapy  Why chosen therapy is appropriate versus another modality: relevant to diagnosis  Target symptoms: alcohol abuse  Primary focus: Provided psychoeducation to patient's mother regarding patient's " diagnosis of alcohol use disorder.  Discussed that alcohol use disorder is a chronic illness and that patient will require continued treatment following completion of ABU IOP.  Discussed having patient continuing to attend aftercare groups through ABU and continued AA meetings even after he returns home to [place], [place].  Allowed patient's mother to express any concerns that she had and to ask questions regarding patient's aftercare.  Psychotherapeutic techniques: Active listening, Psychoeducation  Outcome monitoring methods: self-report, feedback from family      Medication Side Effects: None  Cravings: Denies  No other acute psychiatric issues reported at this time.    Scheduled Meds:   Current Outpatient Prescriptions on File Prior to Encounter   Medication Sig Dispense Refill    amLODIPine (NORVASC) 5 MG tablet Take 1 tablet (5 mg total) by mouth once daily. 30 tablet 11    bisacodyl (DULCOLAX) 5 mg EC tablet Take 2 tablets (10 mg total) by mouth daily as needed for Constipation. 60 tablet 5    buprenorphine-naloxone 2-0.5 mg (SUBOXONE) 2-0.5 mg Film Take 2 film sublingually in the morning and 1 film sublingually in the evening 90 each 0    ciprofloxacin HCl (CIPRO) 250 MG tablet Take 1 tablet (250 mg total) by mouth every 12 (twelve) hours. 10 tablet 0    dronabinol (MARINOL) 2.5 MG capsule Take 1 capsule (2.5 mg total) by mouth once daily. 30 capsule 0    eltrombopag (PROMACTA) 50 MG Tab Take 1 tablet (50 mg total) by mouth once daily. 30 tablet 11    ergocalciferol (VITAMIN D2) 50,000 unit Cap take 1 capsule by mouth every 7 days 4 capsule 2    famotidine (PEPCID) 20 MG tablet Take 1 tablet (20 mg total) by mouth every evening. 30 tablet 11    folic acid (FOLVITE) 1 MG tablet Take 1 tablet (1 mg total) by mouth once daily. 30 tablet 2    levETIRAcetam (KEPPRA) 500 MG Tab Take 1 tablet (500 mg total) by mouth 2 (two) times daily. 60 tablet 11    levothyroxine (SYNTHROID) 75 MCG tablet Take 1 tablet (75  "mcg total) by mouth before breakfast. 30 tablet 11    mirtazapine (REMERON) 7.5 MG Tab Take 1 tablet (7.5 mg total) by mouth every evening. 30 tablet 2    ondansetron (ZOFRAN-ODT) 8 MG TbDL Take 1 tablet (8 mg total) by mouth every 8 (eight) hours as needed (nausea). 30 tablet 1    predniSONE (DELTASONE) 20 MG tablet Take daily by mouth daily: 60mg 6/4-6/10, 50mg (2.5 tab) 6/11-6/17, 40mg (2 tab) 6/18-6/24, 30mg (1.5 tab) 6/25-7/1, 20 mg 7/2/18 75 tablet 11    sevelamer carbonate (RENVELA) 800 mg Tab Take 2 tablets (1,600 mg total) by mouth 3 (three) times daily with meals. 180 tablet 11    sirolimus (RAPAMUNE) 1 MG Tab Take 1 tablet (1 mg total) by mouth once daily. 30 tablet 11    sirolimus 0.5 mg Tab Take 1 tablet (0.5 mg total) by mouth once daily. Patient to take a total of 1.5mg dose daily ( One 1mg tablet, and one 0.5 tablet) 30 tablet 11    sodium bicarbonate 650 MG tablet Take 1 tablet (650 mg total) by mouth 2 (two) times daily. 60 tablet 11    torsemide (DEMADEX) 20 MG Tab Take 3 tablets (60 mg total) by mouth once daily. 90 tablet 2    ursodiol (ACTIGALL) 300 mg capsule Take 1 capsule (300 mg total) by mouth 2 (two) times daily. 60 capsule 11     No current facility-administered medications on file prior to encounter.      Allergies:  Bactrim [sulfamethoxazole-trimethoprim]    Medical ROS:  See H&P dated 6/29/18 for ROS.     OBJECTIVE:     Vital Signs (Most Recent):  There were no vitals filed for this visit.    Mental Status Exam:  Appearance: casually dressed, thin, using cane for ambulation  Behavior/Cooperation: cooperative  Speech: normal tone, normal rate, normal pitch, normal volume  Mood: euthymic, "pretty good"  Affect: Mood congruent  Thought Process: logical  Thought Content: normal, no suicidality, no homicidality, delusions, or paranoia  Orientation: grossly intact  Memory: Grossly intact  Attention Span/Concentration: Normal  Cognition: grossly intact  Insight: fair  Judgment: " limited    Laboratory:  Recent Results (from the past 168 hour(s))   Toxicology screen, urine    Collection Time: 07/11/18 11:55 AM   Result Value Ref Range    Alcohol, Urine <10 <10 mg/dL    Benzodiazepines Negative     Methadone metabolites Negative     Cocaine (Metab.) Negative     Opiate Scrn, Ur Negative     Barbiturate Screen, Ur Negative     Amphetamine Screen, Ur Negative     THC Negative     Phencyclidine Negative     Creatinine, Random Ur 49.0 23.0 - 375.0 mg/dL    Toxicology Information SEE COMMENT    POCT BREATH ALCOHOL TEST    Collection Time: 07/11/18 11:55 AM   Result Value Ref Range    Breath Alcohol 0.000    CBC auto differential    Collection Time: 07/11/18 12:29 PM   Result Value Ref Range    WBC 5.87 3.90 - 12.70 K/uL    RBC 3.42 (L) 4.60 - 6.20 M/uL    Hemoglobin 10.9 (L) 14.0 - 18.0 g/dL    Hematocrit 33.6 (L) 40.0 - 54.0 %    MCV 98 82 - 98 fL    MCH 31.9 (H) 27.0 - 31.0 pg    MCHC 32.4 32.0 - 36.0 g/dL    RDW 14.7 (H) 11.5 - 14.5 %    Platelets 46 (L) 150 - 350 K/uL    MPV 11.8 9.2 - 12.9 fL    Immature Granulocytes CANCELED 0.0 - 0.5 %    Immature Grans (Abs) CANCELED 0.00 - 0.04 K/uL    nRBC 0 0 /100 WBC    Gran% 81.0 (H) 38.0 - 73.0 %    Lymph% 14.0 (L) 18.0 - 48.0 %    Mono% 1.0 (L) 4.0 - 15.0 %    Eosinophil% 0.0 0.0 - 8.0 %    Basophil% 0.0 0.0 - 1.9 %    Bands 4.0 %    Platelet Estimate Decreased (A)     Aniso Slight     Poik Slight     Poly Occasional     Hypo Occasional     Ovalocytes Occasional     Differential Method Manual    Comprehensive metabolic panel    Collection Time: 07/11/18 12:29 PM   Result Value Ref Range    Sodium 141 136 - 145 mmol/L    Potassium 4.2 3.5 - 5.1 mmol/L    Chloride 106 95 - 110 mmol/L    CO2 21 (L) 23 - 29 mmol/L    Glucose 112 (H) 70 - 110 mg/dL    BUN, Bld 65 (H) 6 - 20 mg/dL    Creatinine 2.7 (H) 0.5 - 1.4 mg/dL    Calcium 9.1 8.7 - 10.5 mg/dL    Total Protein 7.7 6.0 - 8.4 g/dL    Albumin 3.5 3.5 - 5.2 g/dL    Total Bilirubin 0.7 0.1 - 1.0 mg/dL     Alkaline Phosphatase 194 (H) 55 - 135 U/L    AST 34 10 - 40 U/L    ALT 38 10 - 44 U/L    Anion Gap 14 8 - 16 mmol/L    eGFR if African American 35.5 (A) >60 mL/min/1.73 m^2    eGFR if non  30.7 (A) >60 mL/min/1.73 m^2   Troponin I #1    Collection Time: 07/11/18 12:29 PM   Result Value Ref Range    Troponin I 0.010 0.000 - 0.026 ng/mL   B-Type natriuretic peptide (BNP)    Collection Time: 07/11/18 12:29 PM   Result Value Ref Range    BNP 91 0 - 99 pg/mL   Type & Screen    Collection Time: 07/11/18 12:29 PM   Result Value Ref Range    Group & Rh O POS     Indirect Claudia NEG    Phosphorus    Collection Time: 07/11/18 12:29 PM   Result Value Ref Range    Phosphorus 4.3 2.7 - 4.5 mg/dL   Magnesium    Collection Time: 07/11/18 12:29 PM   Result Value Ref Range    Magnesium 2.0 1.6 - 2.6 mg/dL   Lactic acid, plasma    Collection Time: 07/11/18 12:53 PM   Result Value Ref Range    Lactate (Lactic Acid) 1.5 0.5 - 2.2 mmol/L   Blood culture #1 **CANNOT BE ORDERED STAT**    Collection Time: 07/11/18 12:53 PM   Result Value Ref Range    Blood Culture, Routine No growth after 5 days.    Urinalysis, Reflex to Urine Culture Urine, Clean Catch    Collection Time: 07/11/18  1:27 PM   Result Value Ref Range    Specimen UA Urine, Clean Catch     Color, UA Straw Yellow, Straw, Martha    Appearance, UA Clear Clear    pH, UA 7.0 5.0 - 8.0    Specific Gravity, UA 1.005 1.005 - 1.030    Protein, UA Negative Negative    Glucose, UA Negative Negative    Ketones, UA Negative Negative    Bilirubin (UA) Negative Negative    Occult Blood UA Negative Negative    Nitrite, UA Negative Negative    Urobilinogen, UA Negative <2.0 EU/dL    Leukocytes, UA Negative Negative   POCT BREATH ALCOHOL TEST    Collection Time: 07/12/18 11:30 AM   Result Value Ref Range    Breath Alcohol 0.000    Toxicology screen, urine    Collection Time: 07/13/18 10:38 AM   Result Value Ref Range    Alcohol, Urine <10 <10 mg/dL    Benzodiazepines Negative      Methadone metabolites Negative     Cocaine (Metab.) Negative     Opiate Scrn, Ur Negative     Barbiturate Screen, Ur Negative     Amphetamine Screen, Ur Negative     THC Negative     Phencyclidine Negative     Creatinine, Random Ur 50.0 23.0 - 375.0 mg/dL    Toxicology Information SEE COMMENT    POCT BREATH ALCOHOL TEST    Collection Time: 07/13/18 10:39 AM   Result Value Ref Range    Breath Alcohol 0.000    Sirolimus level    Collection Time: 07/16/18  9:34 AM   Result Value Ref Range    Sirolimus Lvl 4.2 4.0 - 20.0 ng/mL   CBC auto differential    Collection Time: 07/16/18  9:34 AM   Result Value Ref Range    WBC 4.84 3.90 - 12.70 K/uL    RBC 3.25 (L) 4.60 - 6.20 M/uL    Hemoglobin 10.2 (L) 14.0 - 18.0 g/dL    Hematocrit 32.2 (L) 40.0 - 54.0 %    MCV 99 (H) 82 - 98 fL    MCH 31.4 (H) 27.0 - 31.0 pg    MCHC 31.7 (L) 32.0 - 36.0 g/dL    RDW 15.4 (H) 11.5 - 14.5 %    Platelets 46 (L) 150 - 350 K/uL    MPV 11.7 9.2 - 12.9 fL    Immature Granulocytes 2.9 (H) 0.0 - 0.5 %    Gran # (ANC) 3.0 1.8 - 7.7 K/uL    Immature Grans (Abs) 0.14 (H) 0.00 - 0.04 K/uL    Lymph # 1.1 1.0 - 4.8 K/uL    Mono # 0.6 0.3 - 1.0 K/uL    Eos # 0.1 0.0 - 0.5 K/uL    Baso # 0.02 0.00 - 0.20 K/uL    nRBC 0 0 /100 WBC    Gran% 62.0 38.0 - 73.0 %    Lymph% 22.3 18.0 - 48.0 %    Mono% 11.4 4.0 - 15.0 %    Eosinophil% 1.0 0.0 - 8.0 %    Basophil% 0.4 0.0 - 1.9 %    Differential Method Automated    Comprehensive metabolic panel    Collection Time: 07/16/18  9:34 AM   Result Value Ref Range    Sodium 141 136 - 145 mmol/L    Potassium 4.0 3.5 - 5.1 mmol/L    Chloride 105 95 - 110 mmol/L    CO2 22 (L) 23 - 29 mmol/L    Glucose 106 70 - 110 mg/dL    BUN, Bld 70 (H) 6 - 20 mg/dL    Creatinine 2.7 (H) 0.5 - 1.4 mg/dL    Calcium 9.1 8.7 - 10.5 mg/dL    Total Protein 6.7 6.0 - 8.4 g/dL    Albumin 3.0 (L) 3.5 - 5.2 g/dL    Total Bilirubin 0.7 0.1 - 1.0 mg/dL    Alkaline Phosphatase 177 (H) 55 - 135 U/L    AST 28 10 - 40 U/L    ALT 30 10 - 44 U/L    Anion Gap  14 8 - 16 mmol/L    eGFR if African American 35.5 (A) >60 mL/min/1.73 m^2    eGFR if non  30.7 (A) >60 mL/min/1.73 m^2   Toxicology screen, urine    Collection Time: 07/16/18 11:13 AM   Result Value Ref Range    Alcohol, Urine <10 <10 mg/dL    Benzodiazepines Negative     Methadone metabolites Negative     Cocaine (Metab.) Negative     Opiate Scrn, Ur Negative     Barbiturate Screen, Ur Negative     Amphetamine Screen, Ur Negative     THC Negative     Phencyclidine Negative     Creatinine, Random Ur 35.0 23.0 - 375.0 mg/dL    Toxicology Information SEE COMMENT    POCT BREATH ALCOHOL TEST    Collection Time: 07/16/18 11:13 AM   Result Value Ref Range    Breath Alcohol 0.000      ASSESSMENT:     Alcohol use disorder, severe, in early remission  Opioid use disorder, mild  Unspecified depression (MDD vs. Adjustment disorder)    Patient remains high risk of relapse on opiates.  Requires continued monitoring in IOP at this time. Currently on Suboxone for opiate agonist treatment.  Patient with possible upcoming kidney transplant which would cause risk for relapse on opiates.     PLAN:     · Continue patient on ABU protocol.  · Breathalyzer and urine toxicology daily.  · VS daily x3 days.  · Patient counseled on abstinence from alcohol, opiates (other than Suboxone), and all other drugs of abuse.    Medications: Continue current medications. The risks and benefits of medication were discussed with the patient.  · Continue Suboxone 4 mg qAM and 2 mg QHS for opiate use disorder and pain    · Continue Remeron 7.5 mg QHS PRN sleep      Status: Continue treatment on ABU    Patient's Intervention Response: accepting    Case discussed with Blank Umana MD.    Rajesh Langston MD  [facility]  PGY-4

## 2018-07-18 ENCOUNTER — HOSPITAL ENCOUNTER (OUTPATIENT)
Dept: PSYCHIATRY | Facility: HOSPITAL | Age: 28
Discharge: HOME | End: 2018-07-18
Attending: PSYCHIATRY & NEUROLOGY
Payer: COMMERCIAL

## 2018-07-18 VITALS — SYSTOLIC BLOOD PRESSURE: 121 MMHG | RESPIRATION RATE: 16 BRPM | DIASTOLIC BLOOD PRESSURE: 78 MMHG | HEART RATE: 94 BPM

## 2018-07-18 DIAGNOSIS — F10.21 ALCOHOL USE DISORDER, SEVERE, IN EARLY REMISSION: Primary | ICD-10-CM

## 2018-07-18 DIAGNOSIS — R45.89 ANXIETY ABOUT HEALTH: ICD-10-CM

## 2018-07-18 LAB
AMPHET+METHAMPHET UR QL: NEGATIVE
BARBITURATES UR QL SCN>200 NG/ML: NEGATIVE
BENZODIAZ UR QL SCN>200 NG/ML: NEGATIVE
BREATH ALCOHOL: 0
BZE UR QL SCN: NEGATIVE
CANNABINOIDS UR QL SCN: NEGATIVE
CREAT UR-MCNC: 57 MG/DL
ETHANOL UR-MCNC: <10 MG/DL
METHADONE UR QL SCN>300 NG/ML: NEGATIVE
OPIATES UR QL SCN: NEGATIVE
PCP UR QL SCN>25 NG/ML: NEGATIVE
TOXICOLOGY INFORMATION: NORMAL

## 2018-07-18 PROCEDURE — 90853 GROUP PSYCHOTHERAPY: CPT | Performed by: SOCIAL WORKER

## 2018-07-18 PROCEDURE — 90853 GROUP PSYCHOTHERAPY: CPT | Mod: ,,, | Performed by: PSYCHOLOGIST

## 2018-07-18 PROCEDURE — 80307 DRUG TEST PRSMV CHEM ANLYZR: CPT

## 2018-07-18 PROCEDURE — 90853 GROUP PSYCHOTHERAPY: CPT

## 2018-07-18 NOTE — PLAN OF CARE
07/18/18 1400   Activity/Group Therapy Checklist   Group Educational  (family roles )   Attendance Attended   Follows Direction Followed directions   Group Interactions/Observations Interacted appropriately   Affect/Mood Range Normal range   Affect/Mood Display Appropriate   Goal Progression Progressing

## 2018-07-18 NOTE — PROGRESS NOTES
Group Psychotherapy (PhD/LCSW)    Site: [facility]    Clinical status of patient: Intensive Outpatient Program (IOP)    Date: 7/18/2018    Group Focus: Psychodynamic Group Psychotherapy    Length of service: 23683 - 45-50 minutes    Number of patients in attendance: 7    Referred by: Addictive Behavior Unit Treatment Team    Target symptoms: Alcohol Abuse    Patient's response to treatment: Active Listening and Self-disclosure    Progress toward goals: Progressing adequately    Interval History: Discussed realizing how many things he now has to do to get back to his previous life now that he is stable medically.    Diagnosis: alcohol use disorder, severe, in early remission    Plan: Continue treatment on ABU

## 2018-07-18 NOTE — PATIENT CARE CONFERENCE
ABU Staffing:      Alcohol use disorder, severe, in early remission  Opioid use disorder, mild  Unspecified depression (MDD vs. Adjustment disorder)        1. Pt is attending all groups    2. Pt is attending all meetings  3. Pt 's has minimally supportive family  4. Pt has completed spiritual assessment    5. Pt will present life story    6. Pt will present Step One assignment    7. Pt is exploring issues related to relapse  prevention; spirituality; stress management; improved communication skills; assertiveness training; poor self-esteem; disease concepts; cross addictions; and, work related issues    8. D/C date: TBD     Staff discussed pt's participation and humor in groups. Staff discussed pt's psychosocial hx related to heavy alcohol use and med management w/ suboxone. Staff discussed pt's primary focus on medical problems and minimal insight of substance use hx. Staff discussed pt benefiting from 30 tx days. Staff discussed pt's possible aftercare plans related to pt seeing Dr. Umana for medical appt and attending an aftercare group 1x month since pt lives in [place], [place]. Staff discussed pt mother's attendance at family day. Staff discussed pt's mother appearing overwhelmed with taking care of pt and 4 other children, grappling with pt's health and tx situation, and making an effort to understand pt's dx.         Problem: Alcohol use disorder, severe, early remission  Goal: Address in 12 step meetings and group and individual sessions    Objective Measure: participation in groups, self report, length of sobriety, and relapse prevention plan  Time: Prior to discharge    Progress: Pt is attending groups and sessions     Problem: Opioid use disorder, mild  Goal: Address in 12 step meetings and group and individual sessions    Objective Measure: participation in groups, self report, length of sobriety, and relapse prevention plan  Time: Prior to discharge    Progress: Pt is attending groups and  sessions    Problem: Unspecified depression (MDD vs. Adjustment disorder)  Goal: Address in 12 step meetings and group and individual sessions    Objective Measure: participation in groups, self report, length of sobriety, and relapse prevention plan  Time: Prior to discharge    Progress: Pt is attending groups and sessions       Staff members present:    MD Dr. Adrienne Cartagena, PhD  Dr. Ivis MD Resident  Dr. Hall,  Resident  Nino Villarreal, [facility]  Cassy Pollock, [facility]  Vasquez Guillen, [facility]  Lorie Garcia, RN

## 2018-07-18 NOTE — PROGRESS NOTES
Group Psychotherapy (PhD/LCSW)    Site: [facility]    Clinical status of patient: Intensive Outpatient Program (IOP)    Date: 7/18/2018    Group Focus:  ACT Group Therapy    Length of service: 23956 - 45-50 minutes    Number of patients in attendance: 11    Referred by: Addictive Behavior Unit Treatment Team    Target symptoms: Alcohol Abuse    Patient's response to treatment: Active Listening; Self-disclosure     Progress toward goals: Progressing adequately    Interval History:  Session focus was Commitment to Values (Part 2).  Patients were encouraged to commit to values in each present moment in order to promote a meaningful life.  They were instructed to stock their value pantry with small and big commitments.    Diagnosis: Alcohol Use Disorder, in early remission    Plan: Continue treatment on ABU

## 2018-07-19 ENCOUNTER — HOSPITAL ENCOUNTER (OUTPATIENT)
Dept: PSYCHIATRY | Facility: HOSPITAL | Age: 28
Discharge: HOME | End: 2018-07-19
Attending: PSYCHIATRY & NEUROLOGY
Payer: COMMERCIAL

## 2018-07-19 DIAGNOSIS — F10.21 ALCOHOL USE DISORDER, SEVERE, IN EARLY REMISSION: ICD-10-CM

## 2018-07-19 DIAGNOSIS — R45.89 ANXIETY ABOUT HEALTH: ICD-10-CM

## 2018-07-19 LAB — BREATH ALCOHOL: 0

## 2018-07-19 PROCEDURE — 90853 GROUP PSYCHOTHERAPY: CPT

## 2018-07-20 ENCOUNTER — HOSPITAL ENCOUNTER (OUTPATIENT)
Dept: PSYCHIATRY | Facility: HOSPITAL | Age: 28
Discharge: HOME | End: 2018-07-20
Attending: PSYCHIATRY & NEUROLOGY
Payer: COMMERCIAL

## 2018-07-20 ENCOUNTER — PATIENT MESSAGE (OUTPATIENT)
Dept: NEPHROLOGY | Facility: CLINIC | Age: 28
End: 2018-07-20

## 2018-07-20 VITALS — DIASTOLIC BLOOD PRESSURE: 75 MMHG | RESPIRATION RATE: 16 BRPM | HEART RATE: 102 BPM | SYSTOLIC BLOOD PRESSURE: 119 MMHG

## 2018-07-20 DIAGNOSIS — N18.30 CHRONIC KIDNEY DISEASE (CKD), STAGE III (MODERATE): Primary | ICD-10-CM

## 2018-07-20 DIAGNOSIS — F10.21 ALCOHOL USE DISORDER, SEVERE, IN EARLY REMISSION: Primary | ICD-10-CM

## 2018-07-20 DIAGNOSIS — R45.89 ANXIETY ABOUT HEALTH: ICD-10-CM

## 2018-07-20 PROCEDURE — 99232 SBSQ HOSP IP/OBS MODERATE 35: CPT | Mod: ,,, | Performed by: PSYCHIATRY & NEUROLOGY

## 2018-07-20 PROCEDURE — 80307 DRUG TEST PRSMV CHEM ANLYZR: CPT

## 2018-07-20 PROCEDURE — 90853 GROUP PSYCHOTHERAPY: CPT | Mod: ,,, | Performed by: PSYCHOLOGIST

## 2018-07-20 PROCEDURE — 90853 GROUP PSYCHOTHERAPY: CPT

## 2018-07-20 PROCEDURE — 90853 GROUP PSYCHOTHERAPY: CPT | Performed by: SOCIAL WORKER

## 2018-07-20 NOTE — PROGRESS NOTES
2018 5:32 PM  Name: Jhonny Diana  : 1990  Start Date: 18    [facility] Intensive Outpatient Program   Progress Note    Status: Intensive Outpatient Program (IOP)    HPI:  Jhonny Diana is a 27 y.o. male who presents for admission to ABU with principal problem of opioid use, as well as hx of alcohol use disorder.      Pt is s/p liver transplant in 10/2017 due to acute alcoholic hepatitis. He has a hx of heavy alcohol use since his teens. He required emergent liver transplantation prior to being able to undergo any type of substance abuse treatment. It was recommended that pt attend the ABU program after he received his liver transplant as he was deemed to be very high risk for relapse post-transplant. However pt never followed up in the ABU due to a number of post-op medical complications including ESRD requiring HD (Mon/Fri). In 2018 the addiction psychiatry team was consulted while pt was admitted to the hospital due to concerns for opioid addiction. Pt was started on Suboxone and again recommended to f/u with the ABU for substance abuse treatment.      Today pt reports that he is feeling well physically. He confirms hx of alcohol use since the age of 18. He escalated to drinking a fifth of liquor daily in his early to mid twenties. He attempted to cut back a number of times but was unsuccessful. He has attended AA meetings in the past but has never been to a formal rehab program. He minimizes the effects that alcohol had in his life as he states that he mostly drank by himself, so it did not affect his relationships. He does admit that alcohol was a problem for him however, and recognizes the need for continued life long sobriety following his liver transplant.      Regarding his opiate use, pt reports using 10 mg of oxycontin every 4 hours as was prescribed to him. He reports that he began getting prescriptions for opiates a few months prior to his transplant, and continued to use them until this  "month when he was transitioned to Suboxone. He denies ever buying opiates or using meds which were not prescribed to him. He denies ever injecting or snorting them. He does report feeling better both physically and mentally on Suboxone. He reports feeling like his head is clearer and pain is better controlled. He denies any current side effects from Suboxone or cravings to use other opiates.      Last drink/use: Last use of alcohol 9/2017. Last use of narcotic pain medications (other than Suboxone) 6/14/18  Average consumption: drank a fifth of liquor daily. Used Oxycontin 10 mg PO q4h  Withdrawal: yes, hx of tremors and anxiety from alcohol withdrawal. No hx of complicated alcohol withdrawal or opiate withdrawal  Spouse/partner consumption: n/a  Rehab/Detox: no  AA/NA: yes, has been to AA before. Went for approx 1 year prior to his transplant. States "not a fan" due to Orthodox aspect    SUBJECTIVE:     Interval Hx:  Upon my evaluation, patient is defensive,  linear, organized, alert and oriented x 4. [facility] for approximately 8 hours, causing him to miss IOP. He successfully provided urine, labs and breathalyzer. Patient did not call to inform staff he was not going to return, nor did he answer or return phone calls. Patient is angry that he is placed on "alert" as he feels expectations were not clarified. Will find out about Kidney transplant August 14th.     Medication Side Effects: None  Cravings: Denies  No other acute psychiatric issues reported at this time.    Scheduled Meds:   Current Outpatient Prescriptions on File Prior to Encounter   Medication Sig Dispense Refill    amLODIPine (NORVASC) 5 MG tablet Take 1 tablet (5 mg total) by mouth once daily. 30 tablet 11    bisacodyl (DULCOLAX) 5 mg EC tablet Take 2 tablets (10 mg total) by mouth daily as needed for Constipation. 60 tablet 5    buprenorphine-naloxone 2-0.5 mg (SUBOXONE) 2-0.5 mg Film Take 2 film sublingually in the morning and " 1 film sublingually in the evening 90 each 0    ciprofloxacin HCl (CIPRO) 250 MG tablet Take 1 tablet (250 mg total) by mouth every 12 (twelve) hours. 10 tablet 0    dronabinol (MARINOL) 2.5 MG capsule Take 1 capsule (2.5 mg total) by mouth once daily. 30 capsule 0    eltrombopag (PROMACTA) 50 MG Tab Take 1 tablet (50 mg total) by mouth once daily. 30 tablet 11    ergocalciferol (VITAMIN D2) 50,000 unit Cap take 1 capsule by mouth every 7 days 4 capsule 2    famotidine (PEPCID) 20 MG tablet Take 1 tablet (20 mg total) by mouth every evening. 30 tablet 11    folic acid (FOLVITE) 1 MG tablet Take 1 tablet (1 mg total) by mouth once daily. 30 tablet 2    levETIRAcetam (KEPPRA) 500 MG Tab Take 1 tablet (500 mg total) by mouth 2 (two) times daily. 60 tablet 11    levothyroxine (SYNTHROID) 75 MCG tablet Take 1 tablet (75 mcg total) by mouth before breakfast. 30 tablet 11    mirtazapine (REMERON) 7.5 MG Tab Take 1 tablet (7.5 mg total) by mouth every evening. 30 tablet 2    ondansetron (ZOFRAN-ODT) 8 MG TbDL Take 1 tablet (8 mg total) by mouth every 8 (eight) hours as needed (nausea). 30 tablet 1    predniSONE (DELTASONE) 20 MG tablet Take daily by mouth daily: 60mg 6/4-6/10, 50mg (2.5 tab) 6/11-6/17, 40mg (2 tab) 6/18-6/24, 30mg (1.5 tab) 6/25-7/1, 20 mg 7/2/18 75 tablet 11    sevelamer carbonate (RENVELA) 800 mg Tab Take 2 tablets (1,600 mg total) by mouth 3 (three) times daily with meals. 180 tablet 11    sirolimus (RAPAMUNE) 1 MG Tab Take 1 tablet (1 mg total) by mouth once daily. 30 tablet 11    sirolimus 0.5 mg Tab Take 1 tablet (0.5 mg total) by mouth once daily. Patient to take a total of 1.5mg dose daily ( One 1mg tablet, and one 0.5 tablet) 30 tablet 11    sodium bicarbonate 650 MG tablet Take 1 tablet (650 mg total) by mouth 2 (two) times daily. 60 tablet 11    torsemide (DEMADEX) 20 MG Tab Take 3 tablets (60 mg total) by mouth once daily. 90 tablet 2    ursodiol (ACTIGALL) 300 mg capsule Take 1  capsule (300 mg total) by mouth 2 (two) times daily. 60 capsule 11     No current facility-administered medications on file prior to encounter.      Allergies:  Bactrim [sulfamethoxazole-trimethoprim]    Medical ROS:  See H&P dated 6/29/18 for ROS.     OBJECTIVE:     Vital Signs (Most Recent):  Vitals:    07/20/18 1041   BP: 119/75   Pulse: 102   Resp: 16       Mental Status Exam:  Appearance: casually dressed, thin, using cane for ambulation  Behavior/Cooperation: defensive  Speech: normal tone, normal rate, normal pitch, normal volume  Mood :angry  Affect: Mood congruent  Thought Process: concrete, illogical,   Thought Content: normal, no suicidality, no homicidality, delusions, or paranoia  Orientation: grossly intact  Memory: Grossly intact  Attention Span/Concentration: Normal  Cognition: grossly intact  Insight: poor, continues to minimize severity of opiate use  Judgment: poor, continues to violate rules without much remorse    Laboratory:  Recent Results (from the past 168 hour(s))   Sirolimus level    Collection Time: 07/16/18  9:34 AM   Result Value Ref Range    Sirolimus Lvl 4.2 4.0 - 20.0 ng/mL   CBC auto differential    Collection Time: 07/16/18  9:34 AM   Result Value Ref Range    WBC 4.84 3.90 - 12.70 K/uL    RBC 3.25 (L) 4.60 - 6.20 M/uL    Hemoglobin 10.2 (L) 14.0 - 18.0 g/dL    Hematocrit 32.2 (L) 40.0 - 54.0 %    MCV 99 (H) 82 - 98 fL    MCH 31.4 (H) 27.0 - 31.0 pg    MCHC 31.7 (L) 32.0 - 36.0 g/dL    RDW 15.4 (H) 11.5 - 14.5 %    Platelets 46 (L) 150 - 350 K/uL    MPV 11.7 9.2 - 12.9 fL    Immature Granulocytes 2.9 (H) 0.0 - 0.5 %    Gran # (ANC) 3.0 1.8 - 7.7 K/uL    Immature Grans (Abs) 0.14 (H) 0.00 - 0.04 K/uL    Lymph # 1.1 1.0 - 4.8 K/uL    Mono # 0.6 0.3 - 1.0 K/uL    Eos # 0.1 0.0 - 0.5 K/uL    Baso # 0.02 0.00 - 0.20 K/uL    nRBC 0 0 /100 WBC    Gran% 62.0 38.0 - 73.0 %    Lymph% 22.3 18.0 - 48.0 %    Mono% 11.4 4.0 - 15.0 %    Eosinophil% 1.0 0.0 - 8.0 %    Basophil% 0.4 0.0 - 1.9 %     Differential Method Automated    Comprehensive metabolic panel    Collection Time: 07/16/18  9:34 AM   Result Value Ref Range    Sodium 141 136 - 145 mmol/L    Potassium 4.0 3.5 - 5.1 mmol/L    Chloride 105 95 - 110 mmol/L    CO2 22 (L) 23 - 29 mmol/L    Glucose 106 70 - 110 mg/dL    BUN, Bld 70 (H) 6 - 20 mg/dL    Creatinine 2.7 (H) 0.5 - 1.4 mg/dL    Calcium 9.1 8.7 - 10.5 mg/dL    Total Protein 6.7 6.0 - 8.4 g/dL    Albumin 3.0 (L) 3.5 - 5.2 g/dL    Total Bilirubin 0.7 0.1 - 1.0 mg/dL    Alkaline Phosphatase 177 (H) 55 - 135 U/L    AST 28 10 - 40 U/L    ALT 30 10 - 44 U/L    Anion Gap 14 8 - 16 mmol/L    eGFR if African American 35.5 (A) >60 mL/min/1.73 m^2    eGFR if non  30.7 (A) >60 mL/min/1.73 m^2   Toxicology screen, urine    Collection Time: 07/16/18 11:13 AM   Result Value Ref Range    Alcohol, Urine <10 <10 mg/dL    Benzodiazepines Negative     Methadone metabolites Negative     Cocaine (Metab.) Negative     Opiate Scrn, Ur Negative     Barbiturate Screen, Ur Negative     Amphetamine Screen, Ur Negative     THC Negative     Phencyclidine Negative     Creatinine, Random Ur 35.0 23.0 - 375.0 mg/dL    Toxicology Information SEE COMMENT    POCT BREATH ALCOHOL TEST    Collection Time: 07/16/18 11:13 AM   Result Value Ref Range    Breath Alcohol 0.000    POCT BREATH ALCOHOL TEST    Collection Time: 07/17/18 12:33 PM   Result Value Ref Range    Breath Alcohol 0.000    Toxicology screen, urine    Collection Time: 07/18/18 11:02 AM   Result Value Ref Range    Alcohol, Urine <10 <10 mg/dL    Benzodiazepines Negative     Methadone metabolites Negative     Cocaine (Metab.) Negative     Opiate Scrn, Ur Negative     Barbiturate Screen, Ur Negative     Amphetamine Screen, Ur Negative     THC Negative     Phencyclidine Negative     Creatinine, Random Ur 57.0 23.0 - 375.0 mg/dL    Toxicology Information SEE COMMENT    POCT BREATH ALCOHOL TEST    Collection Time: 07/18/18 11:03 AM   Result Value Ref Range     Breath Alcohol 0.000    POCT BREATH ALCOHOL TEST    Collection Time: 07/19/18 11:54 AM   Result Value Ref Range    Breath Alcohol 0.000    POCT BREATH ALCOHOL TEST    Collection Time: 07/20/18 10:43 AM   Result Value Ref Range    Breath Alcohol 0.000      ASSESSMENT:     Alcohol use disorder, severe, in early remission  Opioid use disorder, mild  Unspecified depression (MDD vs. Adjustment disorder)    Patient remains high risk of relapse on opiates. Continues to demonstrate poor insight, poor judgement, concrete abstraction and child-like behavior. Requires continued monitoring in IOP at this time. Currently on Suboxone for opiate agonist treatment.  Patient with possible upcoming kidney transplant which would cause risk for relapse on opiates.     PLAN:     · Continue patient on ABU protocol.  · Breathalyzer and urine toxicology daily.  · VS daily x3 days.  · Patient counseled on abstinence from alcohol, opiates (other than Suboxone), and all other drugs of abuse.    Medications: Continue current medications. The risks and benefits of medication were discussed with the patient.  · Continue Suboxone 4 mg qAM and 2 mg QHS for opiate use disorder and pain    · Continue Remeron 7.5 mg QHS PRN sleep      Status: Continue treatment on ABU    Patient's Intervention Response: guarded    Case discussed with Blank Umana MD.    Singh Hall, DO  [facility]   PGY-2

## 2018-07-20 NOTE — PROGRESS NOTES
Group Psychotherapy (PhD/LCSW)    Site: [facility]    Clinical status of patient: Intensive Outpatient Program (IOP)    Date: 7/20/2018    Group Focus: Stress Management    Length of service: 59907 - 45-50 minutes    Number of patients in attendance: 13    Referred by: Addictive Behavior Unit Treatment Team    Target symptoms: Alcohol Abuse    Patient's response to treatment: Active Listening    Progress toward goals: Progressing adequately    Interval History: Group learned mindfulness techniques (extended sensory meditation) to improve present-moment awareness, impulsive behavior tendencies, and tolerance of various emotional states.    Diagnosis: Alcohol Use Disorder, in early remission    Plan: Continue treatment on ABU

## 2018-07-20 NOTE — PLAN OF CARE
07/20/18 1400   Activity/Group Therapy Checklist   Group Relapse Prevention  (Compulsive Behavior)   Attendance Attended   Follows Direction Followed directions   Group Interactions/Observations Interacted appropriately   Affect/Mood Range Normal range   Affect/Mood Display Appropriate   Goal Progression Progressing

## 2018-07-20 NOTE — PROGRESS NOTES
Group Psychotherapy (PhD/LCSW)    Site: [facility]    Clinical status of patient: Intensive Outpatient Program (IOP)    Date: 7/20/2018    Group Focus: Psychodynamic Group Psychotherapy    Length of service: 99090 - 45-50 minutes    Number of patients in attendance: 7    Referred by: Addictive Behavior Unit Treatment Team    Target symptoms: Alcohol Abuse    Patient's response to treatment: Active Listening and Self-disclosure    Progress toward goals: Progressing adequately    Interval History: Pt discussed his impatience to resolve his medical problems and get back to his home in [place]. Pt gave supportive feedback to a departing group member.    Diagnosis: alcohol use disorder, severe, in early remission    Plan: Continue treatment on ABU

## 2018-07-20 NOTE — PLAN OF CARE
07/20/18 1000   Activity/Group Therapy Checklist   Group Goals/Reflection  (life story  )   Attendance Attended   Follows Direction Followed directions   Group Interactions/Observations Interacted appropriately   Affect/Mood Range Normal range   Affect/Mood Display Appropriate

## 2018-07-23 ENCOUNTER — TELEPHONE (OUTPATIENT)
Dept: TRANSPLANT | Facility: CLINIC | Age: 28
End: 2018-07-23

## 2018-07-23 ENCOUNTER — OFFICE VISIT (OUTPATIENT)
Dept: TRANSPLANT | Facility: CLINIC | Age: 28
End: 2018-07-23
Payer: COMMERCIAL

## 2018-07-23 ENCOUNTER — HOSPITAL ENCOUNTER (OUTPATIENT)
Dept: PSYCHIATRY | Facility: HOSPITAL | Age: 28
Discharge: HOME | End: 2018-07-23
Attending: PSYCHIATRY & NEUROLOGY
Payer: COMMERCIAL

## 2018-07-23 VITALS
DIASTOLIC BLOOD PRESSURE: 65 MMHG | SYSTOLIC BLOOD PRESSURE: 116 MMHG | TEMPERATURE: 98 F | WEIGHT: 158.75 LBS | BODY MASS INDEX: 25.51 KG/M2 | OXYGEN SATURATION: 100 % | HEART RATE: 92 BPM | RESPIRATION RATE: 16 BRPM | HEIGHT: 66 IN

## 2018-07-23 VITALS — HEART RATE: 98 BPM | RESPIRATION RATE: 16 BRPM | SYSTOLIC BLOOD PRESSURE: 131 MMHG | DIASTOLIC BLOOD PRESSURE: 75 MMHG

## 2018-07-23 DIAGNOSIS — F10.21 ALCOHOL USE DISORDER, SEVERE, IN EARLY REMISSION: Primary | ICD-10-CM

## 2018-07-23 DIAGNOSIS — D69.6 THROMBOCYTOPENIA: ICD-10-CM

## 2018-07-23 DIAGNOSIS — E44.1 MILD PROTEIN-CALORIE MALNUTRITION: ICD-10-CM

## 2018-07-23 DIAGNOSIS — R10.9 ABDOMINAL PAIN, UNSPECIFIED ABDOMINAL LOCATION: ICD-10-CM

## 2018-07-23 DIAGNOSIS — R45.89 ANXIETY ABOUT HEALTH: ICD-10-CM

## 2018-07-23 DIAGNOSIS — Z91.89 AT RISK FOR OPPORTUNISTIC INFECTIONS: ICD-10-CM

## 2018-07-23 DIAGNOSIS — Z79.60 LONG-TERM USE OF IMMUNOSUPPRESSANT MEDICATION: Chronic | ICD-10-CM

## 2018-07-23 DIAGNOSIS — N18.6 ESRD (END STAGE RENAL DISEASE): ICD-10-CM

## 2018-07-23 DIAGNOSIS — Z29.89 PROPHYLACTIC IMMUNOTHERAPY: Chronic | ICD-10-CM

## 2018-07-23 DIAGNOSIS — Z48.23 ENCOUNTER FOR AFTERCARE FOLLOWING LIVER TRANSPLANT: Primary | ICD-10-CM

## 2018-07-23 PROBLEM — R56.9 SEIZURE: Status: RESOLVED | Noted: 2017-11-02 | Resolved: 2018-07-23

## 2018-07-23 PROBLEM — E43 SEVERE PROTEIN-CALORIE MALNUTRITION: Status: RESOLVED | Noted: 2018-01-11 | Resolved: 2018-07-23

## 2018-07-23 LAB — BREATH ALCOHOL: 0

## 2018-07-23 PROCEDURE — 90853 GROUP PSYCHOTHERAPY: CPT

## 2018-07-23 PROCEDURE — 90853 GROUP PSYCHOTHERAPY: CPT | Mod: 59,,, | Performed by: PSYCHOLOGIST

## 2018-07-23 PROCEDURE — 90853 GROUP PSYCHOTHERAPY: CPT | Performed by: SOCIAL WORKER

## 2018-07-23 PROCEDURE — 99999 PR PBB SHADOW E&M-EST. PATIENT-LVL III: CPT | Mod: PBBFAC,TXP,, | Performed by: INTERNAL MEDICINE

## 2018-07-23 PROCEDURE — 90853 GROUP PSYCHOTHERAPY: CPT | Mod: NTX

## 2018-07-23 PROCEDURE — 99215 OFFICE O/P EST HI 40 MIN: CPT | Mod: NTX,S$GLB,, | Performed by: INTERNAL MEDICINE

## 2018-07-23 NOTE — PLAN OF CARE
07/23/18 1000   Activity/Group Therapy Checklist   Group Goals/Reflection  (incomplete prompts )   Attendance Attended   Follows Direction Followed directions   Group Interactions/Observations Interacted appropriately   Affect/Mood Range Normal range   Affect/Mood Display Appropriate

## 2018-07-23 NOTE — TELEPHONE ENCOUNTER
Labs reviewed and stable. Next labs were to be this am. It appears that the lab only kelli Dr. Peterson renal panel. Sent a [facility] msg to pt asking him to have transplant labs done tomorrow morning before MD appt.

## 2018-07-23 NOTE — Clinical Note
HD stopped since last week, good UOP. He will see KTM next month. I would still want him to see KTM though he may not need or qualify KT anymore. He is feeling stronger. He is ok to drive. I filled out DMV form. He will be done with ABU in early August. Return visit in mid August. He plans to return home in [place] in mid August. I stopped Keppra, Remiron and Marinol. He has not been taking Remiron and Marinol anyway.  Recommendation(s): - continue suboxome as prescribed/recommended by Dr. Bucio - continue sirolimus (rapamune) and other medications, no change - continue enhanced nutrition with protein supplements - stop leviteracetam - stop Remiron and Marinol

## 2018-07-23 NOTE — PATIENT INSTRUCTIONS
Recommendation(s):  - continue suboxome as prescribed/recommended by Dr. Bucio  - continue sirolimus (rapamune) and other medications, no change  - continue enhanced nutrition with protein supplements  - stop leviteracetam  - stop Remiron and Marinol

## 2018-07-23 NOTE — TELEPHONE ENCOUNTER
----- Message from Georgina Rogers MD sent at 7/17/2018  8:18 AM CDT -----  Labs stable, no action needed.

## 2018-07-23 NOTE — PROGRESS NOTES
Group Psychotherapy (PhD/LCSW)    Site: [facility]    Clinical status of patient: Intensive Outpatient Program (IOP)    Date: 7/23/2018    Group Focus: Communication Skills       Length of service: 95756 - 45-50 minutes    Number of patients in attendance: 11    Referred by: Addictive Behavior Unit Treatment Team    Target symptoms: Alcohol Abuse    Patient's response to treatment: Active Listening      Progress toward goals: Progressing adequately    Interval History: Discussed basic communication skills (I-messages; Reflective Listening; Contextual/Approach considerations). Illustrated their applications to current interpersonal problems affecting group members. ed a transplant. He is very happy to be off of dialysis.     Diagnosis: alcohol use disorder, severe, in early remission    Plan: Continue treatment on ABU

## 2018-07-23 NOTE — PROGRESS NOTES
Transplant Hepatology  Liver Transplant Recipient Follow-up    Transplant Date: 10/19/2017  UNOS Native Liver Dx: Acute Alcoholic Hepatitis    Mr. Diana is here for follow up of his liver transplant.  He is accompanied by his mother.      ORGAN: LIVER  Whole or Partial: whole liver  Donor Type:  - brain death  CDC High Risk: no  Donor CMV Status: Positive  Donor HCV Status: Negative  Donor HBcAb: Negative  Biliary Anastomosis: end to end  Arterial Anatomy: standard  IVC reconstruction: end to end ivc  Portal vein status: patent      Subjective:     Interval History:   Patient is doing well. He is attending ABU - the last date is 18.  Last HD was 18 - Nephrology stopped HD since last week due to improving renal function. He is gaining weight. He feels stronger. He does not have to use a cane for support.     IS is currently sirolimus 1 mg daily.  He notes increase in urine output.       Review of Systems   Constitutional: Positive for fatigue. Negative for activity change, appetite change, chills and unexpected weight change.   HENT: Negative for hearing loss and sore throat.    Eyes: Negative for visual disturbance.   Respiratory: Negative for shortness of breath.    Cardiovascular: Negative for chest pain and leg swelling.   Gastrointestinal: Negative for abdominal distention, abdominal pain, nausea and vomiting.   Musculoskeletal: Negative for gait problem.   Skin: Negative for rash.   Neurological: Negative for weakness and headaches.   Hematological: Negative for adenopathy. Does not bruise/bleed easily.   Psychiatric/Behavioral: Negative for confusion.       Objective:     Physical Exam   Constitutional: He is oriented to person, place, and time. He appears well-developed and well-nourished. No distress.   HENT:   Head: Normocephalic and atraumatic.   Mouth/Throat: Oropharynx is clear and moist.   Eyes: Conjunctivae are normal. Pupils are equal, round, and reactive to light. No scleral  icterus.   Neck: Normal range of motion. Neck supple. No thyromegaly present.   Cardiovascular: Normal rate, regular rhythm and normal heart sounds.  Exam reveals no gallop and no friction rub.    No murmur heard.  Pulmonary/Chest: Effort normal and breath sounds normal. No respiratory distress. He has no wheezes. He has no rales.   Abdominal: Soft. Bowel sounds are normal. He exhibits no distension. There is no tenderness. There is no guarding.   Well healed Chevron incision   Musculoskeletal: Normal range of motion. He exhibits edema (1+ BLE ).   Lymphadenopathy:     He has no cervical adenopathy.   Neurological: He is alert and oriented to person, place, and time. No cranial nerve deficit.   Skin: Skin is warm and dry. No erythema.   Psychiatric: He has a normal mood and affect. His behavior is normal.   Vitals reviewed.    Lab Results   Component Value Date    BILITOT 0.7 07/16/2018    AST 28 07/16/2018    ALT 30 07/16/2018    ALKPHOS 177 (H) 07/16/2018    CREATININE 2.7 (H) 07/16/2018    ALBUMIN 3.0 (L) 07/16/2018     Lab Results   Component Value Date    WBC 4.84 07/16/2018    HGB 10.2 (L) 07/16/2018    HCT 32.2 (L) 07/16/2018    HCT 28 (L) 10/20/2017    PLT 46 (L) 07/16/2018     Lab Results   Component Value Date    TACROLIMUS <1.5 (L) 03/04/2018    CYCLOSPORINE <10 (L) 06/02/2018    SIROLIMUSLEV 4.2 07/16/2018       Assessment/Plan:   27yo male s/p OLT with complicated course including abnormal liver tests, volume overload, renal insufficiency, biliary anastomotic strictures, thrombocytopenia and chronic abdominal pain.    Patient has been doing well. Abd pain is controlled on suboxome, managed by Addiction Psychiatry.  Patient does not have seizure disorder, his seizure activities after liver transplant by medication-induced. We will stop his leviteracetam.    Recommendation(s):  - continue suboxome as prescribed/recommended by Dr. Bucio  - continue sirolimus (rapamune) and other medications, no  change  - continue enhanced nutrition with protein supplements  - stop leviteracetam  - stop Remiron and Marinol    A total of 40 minutes were spent face-to-face with the patient during this encounter and over half of that time was spent on counseling and coordination of care.  We discussed in depth the nature of the patient's immunocompromised status, liver graft function, immunosuppression, preventative measures and the management plan in details. I also educated the patient about lifestyle modifications which may improve hepatic steatosis, overweight/obesity, insulin resistance and high blood pressure issues. I have provided the patient with an opportunity to ask questions and have all questions answered to his satisfaction.     Georgina Rogers MD         [facility] Patient Status  Functional Status: 80% - Normal activity with effort: some symptoms of disease  Physical Capacity: Limited Mobility

## 2018-07-23 NOTE — PROGRESS NOTES
2018 5:32 PM  Name: Jhonny Diana  : 1990  Start Date: 18    [facility]U Intensive Outpatient Program   Progress Note    Status: Intensive Outpatient Program (IOP)    HPI:  Jhonny Diana is a 27 y.o. male who presents for admission to ABU with principal problem of opioid use, as well as hx of alcohol use disorder.      Pt is s/p liver transplant in 10/2017 due to acute alcoholic hepatitis. He has a hx of heavy alcohol use since his teens. He required emergent liver transplantation prior to being able to undergo any type of substance abuse treatment. It was recommended that pt attend the ABU program after he received his liver transplant as he was deemed to be very high risk for relapse post-transplant. However pt never followed up in the ABU due to a number of post-op medical complications including ESRD requiring HD (Mon/Fri). In 2018 the addiction psychiatry team was consulted while pt was admitted to the hospital due to concerns for opioid addiction. Pt was started on Suboxone and again recommended to f/u with the ABU for substance abuse treatment.      Today pt reports that he is feeling well physically. He confirms hx of alcohol use since the age of 18. He escalated to drinking a fifth of liquor daily in his early to mid twenties. He attempted to cut back a number of times but was unsuccessful. He has attended AA meetings in the past but has never been to a formal rehab program. He minimizes the effects that alcohol had in his life as he states that he mostly drank by himself, so it did not affect his relationships. He does admit that alcohol was a problem for him however, and recognizes the need for continued life long sobriety following his liver transplant.      Regarding his opiate use, pt reports using 10 mg of oxycontin every 4 hours as was prescribed to him. He reports that he began getting prescriptions for opiates a few months prior to his transplant, and continued to use them until this  "month when he was transitioned to Suboxone. He denies ever buying opiates or using meds which were not prescribed to him. He denies ever injecting or snorting them. He does report feeling better both physically and mentally on Suboxone. He reports feeling like his head is clearer and pain is better controlled. He denies any current side effects from Suboxone or cravings to use other opiates.      Last drink/use: Last use of alcohol 9/2017. Last use of narcotic pain medications (other than Suboxone) 6/14/18  Average consumption: drank a fifth of liquor daily. Used Oxycontin 10 mg PO q4h  Withdrawal: yes, hx of tremors and anxiety from alcohol withdrawal. No hx of complicated alcohol withdrawal or opiate withdrawal  Spouse/partner consumption: n/a  Rehab/Detox: no  AA/NA: yes, has been to AA before. Went for approx 1 year prior to his transplant. States "not a fan" due to Baptism aspect    SUBJECTIVE:     Interval Hx:  States that he did not require HD on Friday. States that he was told that he would no longer need HD at this time.  States that his kidney function has improved some so he is still waiting to here whether or not he would require a kidney transplant. Has appointment with kidney transplant service coming up.  Patient had appointment with Dr. Rogers from transplant service this morning. States that he was taken off Keppra this morning and was continued on all other medications.  States that his grandmother came in town over the weekend.  States that he was still able to make it to his AA meetings over the weekend.     Medication Side Effects: None  Cravings: Denies  No other acute psychiatric issues reported at this time.    Scheduled Meds:   Current Outpatient Prescriptions on File Prior to Encounter   Medication Sig Dispense Refill    amLODIPine (NORVASC) 5 MG tablet Take 1 tablet (5 mg total) by mouth once daily. 30 tablet 11    bisacodyl (DULCOLAX) 5 mg EC tablet Take 2 tablets (10 mg total) by mouth " daily as needed for Constipation. 60 tablet 5    buprenorphine-naloxone 2-0.5 mg (SUBOXONE) 2-0.5 mg Film Take 2 film sublingually in the morning and 1 film sublingually in the evening 90 each 0    eltrombopag (PROMACTA) 50 MG Tab Take 1 tablet (50 mg total) by mouth once daily. 30 tablet 11    ergocalciferol (VITAMIN D2) 50,000 unit Cap take 1 capsule by mouth every 7 days 4 capsule 2    famotidine (PEPCID) 20 MG tablet Take 1 tablet (20 mg total) by mouth every evening. 30 tablet 11    folic acid (FOLVITE) 1 MG tablet Take 1 tablet (1 mg total) by mouth once daily. 30 tablet 2    levothyroxine (SYNTHROID) 75 MCG tablet Take 1 tablet (75 mcg total) by mouth before breakfast. 30 tablet 11    ondansetron (ZOFRAN-ODT) 8 MG TbDL Take 1 tablet (8 mg total) by mouth every 8 (eight) hours as needed (nausea). 30 tablet 1    predniSONE (DELTASONE) 20 MG tablet Take daily by mouth daily: 60mg 6/4-6/10, 50mg (2.5 tab) 6/11-6/17, 40mg (2 tab) 6/18-6/24, 30mg (1.5 tab) 6/25-7/1, 20 mg 7/2/18 75 tablet 11    sevelamer carbonate (RENVELA) 800 mg Tab Take 2 tablets (1,600 mg total) by mouth 3 (three) times daily with meals. 180 tablet 11    sirolimus (RAPAMUNE) 1 MG Tab Take 1 tablet (1 mg total) by mouth once daily. 30 tablet 11    sirolimus 0.5 mg Tab Take 1 tablet (0.5 mg total) by mouth once daily. Patient to take a total of 1.5mg dose daily ( One 1mg tablet, and one 0.5 tablet) 30 tablet 11    sodium bicarbonate 650 MG tablet Take 1 tablet (650 mg total) by mouth 2 (two) times daily. 60 tablet 11    torsemide (DEMADEX) 20 MG Tab Take 3 tablets (60 mg total) by mouth once daily. 90 tablet 2    ursodiol (ACTIGALL) 300 mg capsule Take 1 capsule (300 mg total) by mouth 2 (two) times daily. 60 capsule 11    [DISCONTINUED] ciprofloxacin HCl (CIPRO) 250 MG tablet Take 1 tablet (250 mg total) by mouth every 12 (twelve) hours. 10 tablet 0    [DISCONTINUED] dronabinol (MARINOL) 2.5 MG capsule Take 1 capsule (2.5 mg  "total) by mouth once daily. 30 capsule 0    [DISCONTINUED] levETIRAcetam (KEPPRA) 500 MG Tab Take 1 tablet (500 mg total) by mouth 2 (two) times daily. 60 tablet 11    [DISCONTINUED] mirtazapine (REMERON) 7.5 MG Tab Take 1 tablet (7.5 mg total) by mouth every evening. 30 tablet 2     No current facility-administered medications on file prior to encounter.      Allergies:  Bactrim [sulfamethoxazole-trimethoprim]    Medical ROS:  See H&P dated 6/29/18 for ROS.     OBJECTIVE:     Vital Signs (Most Recent):  Vitals:    07/23/18 1056   BP: 131/75   Pulse: 98   Resp: 16       Mental Status Exam:  Appearance: casually dressed, thin, using cane for ambulation  Behavior/Cooperation: cooperative, eye contact normal  Speech: normal tone, normal rate, normal pitch, normal volume  Mood "pretty good"  Affect: Mood congruent  Thought Process: goal-directed,   Thought Content: normal, no suicidality, no homicidality, delusions, or paranoia  Orientation: grossly intact  Memory: Grossly intact  Attention Span/Concentration: Normal  Cognition: grossly intact  Insight: poor, minimizes severity of alcohol and opiate use  Judgment: poor    Laboratory:  Recent Results (from the past 168 hour(s))   POCT BREATH ALCOHOL TEST    Collection Time: 07/17/18 12:33 PM   Result Value Ref Range    Breath Alcohol 0.000    Toxicology screen, urine    Collection Time: 07/18/18 11:02 AM   Result Value Ref Range    Alcohol, Urine <10 <10 mg/dL    Benzodiazepines Negative     Methadone metabolites Negative     Cocaine (Metab.) Negative     Opiate Scrn, Ur Negative     Barbiturate Screen, Ur Negative     Amphetamine Screen, Ur Negative     THC Negative     Phencyclidine Negative     Creatinine, Random Ur 57.0 23.0 - 375.0 mg/dL    Toxicology Information SEE COMMENT    POCT BREATH ALCOHOL TEST    Collection Time: 07/18/18 11:03 AM   Result Value Ref Range    Breath Alcohol 0.000    POCT BREATH ALCOHOL TEST    Collection Time: 07/19/18 11:54 AM   Result " Value Ref Range    Breath Alcohol 0.000    Toxicology screen, urine    Collection Time: 07/20/18 10:41 AM   Result Value Ref Range    Alcohol, Urine <10 <10 mg/dL    Benzodiazepines Negative     Methadone metabolites Negative     Cocaine (Metab.) Negative     Opiate Scrn, Ur Negative     Barbiturate Screen, Ur Negative     Amphetamine Screen, Ur Negative     THC Negative     Phencyclidine Negative     Creatinine, Random Ur 50.0 23.0 - 375.0 mg/dL    Toxicology Information SEE COMMENT    POCT BREATH ALCOHOL TEST    Collection Time: 07/20/18 10:43 AM   Result Value Ref Range    Breath Alcohol 0.000    Renal function panel    Collection Time: 07/23/18  7:32 AM   Result Value Ref Range    Glucose 119 (H) 70 - 110 mg/dL    Sodium 141 136 - 145 mmol/L    Potassium 4.1 3.5 - 5.1 mmol/L    Chloride 106 95 - 110 mmol/L    CO2 22 (L) 23 - 29 mmol/L    BUN, Bld 78 (H) 6 - 20 mg/dL    Calcium 9.4 8.7 - 10.5 mg/dL    Creatinine 2.6 (H) 0.5 - 1.4 mg/dL    Albumin 3.2 (L) 3.5 - 5.2 g/dL    Phosphorus 5.4 (H) 2.7 - 4.5 mg/dL    eGFR if  36.9 (A) >60 mL/min/1.73 m^2    eGFR if non  31.9 (A) >60 mL/min/1.73 m^2    Anion Gap 13 8 - 16 mmol/L   POCT BREATH ALCOHOL TEST    Collection Time: 07/23/18 10:55 AM   Result Value Ref Range    Breath Alcohol 0.000      ASSESSMENT:     Alcohol use disorder, severe, in early remission  Opioid use disorder, mild  Unspecified depression (MDD vs. Adjustment disorder)    Patient remains high risk of relapse on opiates. Requires continued monitoring in IOP at this time. Currently on Suboxone for opiate agonist treatment.  Patient with possible upcoming kidney transplant which would cause risk for relapse on opiates.     PLAN:     · Continue patient on ABU protocol.  · Breathalyzer and urine toxicology daily.  · VS daily x3 days.  · Patient counseled on abstinence from alcohol, opiates (other than Suboxone), and all other drugs of abuse.    Medications: Continue current  medications. The risks and benefits of medication were discussed with the patient.  · Continue Suboxone 4 mg qAM and 2 mg QHS for opiate use disorder and pain    · Continue Remeron 7.5 mg QHS PRN sleep      Status: Continue treatment on ABU    Patient's Intervention Response: guarded    Case discussed with MD Rajesh Galloway MD  [facility]  PGY-4

## 2018-07-23 NOTE — PROGRESS NOTES
"Group Psychotherapy (PhD/LCSW)    Site: [facility]    Clinical status of patient: Intensive Outpatient Program (IOP)    Date: 7/23/2018    Group Focus: Psychodynamic Group Psychotherapy    Length of service: 65314 - 45-50 minutes    Number of patients in attendance: 6    Referred by: Addictive Behavior Unit Treatment Team    Target symptoms: Alcohol Abuse    Patient's response to treatment: Active Listening and Self-disclosure    Progress toward goals: Progressing adequately    Interval History: Pt reported that he spent the weekend organizing himself for the challenge of moving back to his home in [place]. He noted that his "numbers" for his kidney problems continue to slowly improve so that there is hope that he may not need a transplant. He is very happy to be off of dialysis.     Diagnosis: alcohol use disorder, severe, in early remission    Plan: Continue treatment on ABU        "

## 2018-07-23 NOTE — PLAN OF CARE
07/23/18 1400   Activity/Group Therapy Checklist   Group Relapse Prevention   Attendance Attended   Follows Direction Followed directions   Group Interactions/Observations Interacted appropriately;Supportive   Affect/Mood Range Normal range   Affect/Mood Display Appropriate   Goal Progression Progressing

## 2018-07-23 NOTE — LETTER
July 23, 2018        Michael Peterson  [address] [facility] [address]  Phone: [phone]  Fax: [phone]             [facility] Trans[facility]  [address]  [facility] [address]  Phone: [phone]   Patient: Jhonny Diana   MR Number: [id number]   YOB: 1990   Date of Visit: 7/23/2018       Dear Dr. Michael Peterson    Thank you for referring Jhonny Diana to me for evaluation. Attached you will find relevant portions of my assessment and plan of care.    If you have questions, please do not hesitate to call me. I look forward to following Jhonny Diana along with you.    Sincerely,    Georgina Rogers MD    Enclosure    If you would like to receive this communication electronically, please contact [email or web address] or [phone] to request [employer] access.    [employer] is a tool which provides read-only access to select patient information with whom you have a relationship. Its easy to use and provides real time access to review your patients record including encounter summaries, notes, results, and demographic information.    If you feel you have received this communication in error or would no longer like to receive these types of communications, please e-mail [email or web address]

## 2018-07-24 ENCOUNTER — HOSPITAL ENCOUNTER (OUTPATIENT)
Dept: PSYCHIATRY | Facility: HOSPITAL | Age: 28
Discharge: HOME | End: 2018-07-24
Attending: PSYCHIATRY & NEUROLOGY
Payer: COMMERCIAL

## 2018-07-24 DIAGNOSIS — R45.89 ANXIETY ABOUT HEALTH: ICD-10-CM

## 2018-07-24 DIAGNOSIS — F10.21 ALCOHOL USE DISORDER, SEVERE, IN EARLY REMISSION: Primary | ICD-10-CM

## 2018-07-24 DIAGNOSIS — N18.30 CHRONIC KIDNEY DISEASE (CKD), STAGE III (MODERATE): Primary | ICD-10-CM

## 2018-07-24 LAB — BREATH ALCOHOL: 0

## 2018-07-24 PROCEDURE — 90853 GROUP PSYCHOTHERAPY: CPT | Mod: ,,, | Performed by: PSYCHOLOGIST

## 2018-07-24 PROCEDURE — 90853 GROUP PSYCHOTHERAPY: CPT | Performed by: SOCIAL WORKER

## 2018-07-24 PROCEDURE — 90853 GROUP PSYCHOTHERAPY: CPT | Mod: NTX

## 2018-07-24 NOTE — TELEPHONE ENCOUNTER
FOR DOCUMENTATION ONLY:  Financial Assistance for Promacta is approved from  7-24-18 to 10-24-18 ( pending final approval)  Source [facility] Patient Assistance  [phone]

## 2018-07-24 NOTE — PROGRESS NOTES
Group Psychotherapy (PhD/LCSW)    Site: [facility]    Clinical status of patient: Intensive Outpatient Program (IOP)    Date: 7/24/2018    Group Focus: Disease Model of Addiction      Length of service: 65305 - 45-50 minutes    Number of patients in attendance: 6    Referred by: Addictive Behavior Unit Treatment Team    Target symptoms: Alcohol Abuse    Patient's response to treatment: Active Listening     Progress toward goals: Progressing adequately    Interval History: iscussed the basic neuropsychological concepts of the Disease Model of Addiction and their relationship to the phenomena of addiction (eg, powerlessness; euphoric recall, cravings, relapse.tolerance, etc). Noted the value of understanding the Disease Model for sustaining long-term sobriety.    Diagnosis: alcohol use disorder, severe, in early remission    Plan: Continue treatment on ABU

## 2018-07-24 NOTE — PROGRESS NOTES
Group Psychotherapy (PhD/LCSW)    Site: [facility]    Clinical status of patient: Intensive Outpatient Program (IOP)    Date: 7/24/2018    Group Focus:  ACT Group Therapy    Length of service: 20198 - 45-50 minutes    Number of patients in attendance: 12    Referred by: Addictive Behavior Unit Treatment Team    Target symptoms: Alcohol Abuse    Patient's response to treatment: Active Listening; Self-disclosure     Progress toward goals: Progressing adequately    Interval History:  Session focus was Avoidance and Acceptance.  Patients were introduced to the costs of avoidance, values of acceptance, and willingness (using the head exercise).    Diagnosis: Alcohol Use Disorder, in early remission    Plan: Continue treatment on ABU

## 2018-07-24 NOTE — PROGRESS NOTES
Group Psychotherapy (PhD/LCSW)    Site: [facility]    Clinical status of patient: Intensive Outpatient Program (IOP)    Date: 7/24/2018    Group Focus: Psychodynamic Group Psychotherapy    Length of service: 99321 - 45-50 minutes    Number of patients in attendance: 6    Referred by: Addictive Behavior Unit Treatment Team    Target symptoms: Alcohol Abuse    Patient's response to treatment: Active Listening and Self-disclosure    Progress toward goals: Progressing adequately    Interval History: Discussed drinking alone so others would not know how much he was drinking.    Diagnosis: alcohol use disorder, severe, in early remission    Plan: Continue treatment on ABU

## 2018-07-24 NOTE — PLAN OF CARE
07/24/18 1400   Activity/Group Therapy Checklist   Group Goals/Reflection  (life story )   Attendance Attended   Follows Direction Followed directions   Group Interactions/Observations Interacted appropriately   Affect/Mood Range Normal range   Affect/Mood Display Appropriate

## 2018-07-25 ENCOUNTER — HOSPITAL ENCOUNTER (OUTPATIENT)
Dept: PSYCHIATRY | Facility: HOSPITAL | Age: 28
Discharge: HOME | End: 2018-07-25
Attending: PSYCHIATRY & NEUROLOGY
Payer: COMMERCIAL

## 2018-07-25 VITALS — DIASTOLIC BLOOD PRESSURE: 76 MMHG | RESPIRATION RATE: 16 BRPM | HEART RATE: 108 BPM | SYSTOLIC BLOOD PRESSURE: 136 MMHG

## 2018-07-25 DIAGNOSIS — F10.21 ALCOHOL USE DISORDER, SEVERE, IN EARLY REMISSION: Primary | ICD-10-CM

## 2018-07-25 DIAGNOSIS — R45.89 ANXIETY ABOUT HEALTH: ICD-10-CM

## 2018-07-25 LAB
AMPHET+METHAMPHET UR QL: NEGATIVE
BARBITURATES UR QL SCN>200 NG/ML: NEGATIVE
BENZODIAZ UR QL SCN>200 NG/ML: NEGATIVE
BREATH ALCOHOL: 0
BZE UR QL SCN: NEGATIVE
CANNABINOIDS UR QL SCN: NEGATIVE
CREAT UR-MCNC: 28 MG/DL
ETHANOL UR-MCNC: <10 MG/DL
METHADONE UR QL SCN>300 NG/ML: NEGATIVE
OPIATES UR QL SCN: NEGATIVE
PCP UR QL SCN>25 NG/ML: NEGATIVE
TOXICOLOGY INFORMATION: NORMAL

## 2018-07-25 PROCEDURE — 90853 GROUP PSYCHOTHERAPY: CPT

## 2018-07-25 PROCEDURE — 80307 DRUG TEST PRSMV CHEM ANLYZR: CPT

## 2018-07-25 PROCEDURE — 90853 GROUP PSYCHOTHERAPY: CPT | Performed by: SOCIAL WORKER

## 2018-07-25 PROCEDURE — 90853 GROUP PSYCHOTHERAPY: CPT | Mod: ,,, | Performed by: PSYCHOLOGIST

## 2018-07-25 NOTE — PATIENT CARE CONFERENCE
ABU Staffing:      Alcohol use disorder, severe, in early remission  Opioid use disorder, mild  Unspecified depression (MDD vs. Adjustment disorder)        1. Pt is attending all groups    2. Pt is attending all meetings  3. Pt 's has minimally supportive family  4. Pt has completed spiritual assessment    5. Pt will present life story    6. Pt will present Step One assignment    7. Pt is exploring issues related to relapse  prevention; spirituality; stress management; improved communication skills; assertiveness training; poor self-esteem; disease concepts; cross addictions; and, work related issues    8. D/C date: 8/14     Staff discussed pt's compliance within the program and consistently attending AA meeting. Staff discussed pt report of no longer being on dialysis.  Staff discussed dissatisfied reaction to his alert status. Staff discussed pt's father and pt's relationship hx. Staff discussed pt drinking hx and his rationalization of his opioid use hx. Staff discussed pt's high risk and pt benefiting from 30 days.         Problem: Alcohol use disorder, severe, early remission  Goal: Address in 12 step meetings and group and individual sessions    Objective Measure: participation in groups, self report, length of sobriety, and relapse prevention plan  Time: Prior to discharge    Progress: Pt is attending groups and sessions     Problem: Opioid use disorder, mild  Goal: Address in 12 step meetings and group and individual sessions    Objective Measure: participation in groups, self report, length of sobriety, and relapse prevention plan  Time: Prior to discharge    Progress: Pt is attending groups and sessions    Problem: Unspecified depression (MDD vs. Adjustment disorder)  Goal: Address in 12 step meetings and group and individual sessions    Objective Measure: participation in groups, self report, length of sobriety, and relapse prevention plan  Time: Prior to discharge    Progress: Pt is attending groups and  sessions       Staff members present:     Dr. Deleon, PhD  Dr. Langston, MD Resident  Dr. Hall,  Resident  Nino Villarreal, [facility]  Cassy Pollock, [facility]  Vasquez Guillen, LCSW  Lorie Garcia RN

## 2018-07-25 NOTE — PROGRESS NOTES
Group Psychotherapy (PhD/LCSW)    Site: [facility]    Clinical status of patient: Intensive Outpatient Program (IOP)    Date: 7/25/2018    Group Focus: Psychodynamic Group Psychotherapy    Length of service: 09301 - 45-50 minutes    Number of patients in attendance: 6    Referred by: Addictive Behavior Unit Treatment Team    Target symptoms: Alcohol Abuse    Patient's response to treatment: Active Listening and Self-disclosure    Progress toward goals: Progressing adequately    Interval History: Discussed being a solitary drinker.    Diagnosis: alcohol use disorder, severe, in early remission    Plan: Continue treatment on ABU

## 2018-07-25 NOTE — PROGRESS NOTES
Group Psychotherapy (PhD/LCSW)    Site: [facility]    Clinical status of patient: Intensive Outpatient Program (IOP)    Date: 7/25/2018    Group Focus:  DBT-Based Group Therapy    Length of service: 19796 - 45-50 minutes    Number of patients in attendance: 12    Referred by: Addictive Behavior Unit Treatment Team    Target symptoms: Alcohol Abuse    Patient's response to treatment: Active Listening; Self-disclosure     Progress toward goals: Progressing adequately    Interval History:  Session focus was Acceptance.  Patients were provided with a rationale for radical acceptance and steps to practice accepting reality all the way.       Diagnosis: Alcohol Use Disorder, in early remission    Plan: Continue treatment on ABU

## 2018-07-25 NOTE — PLAN OF CARE
07/25/18 1400   Activity/Group Therapy Checklist   Group Relapse Prevention   Attendance Attended   Group Interactions/Observations Fell Asleep

## 2018-07-25 NOTE — PROGRESS NOTES
2018 5:32 PM  Name: Jhonny Diana  : 1990  Start Date: 18    [facility] Intensive Outpatient Program   Progress Note    Status: Intensive Outpatient Program (IOP)    HPI:  Jhonny Diana is a 27 y.o. male who presents for admission to ABU with principal problem of opioid use, as well as hx of alcohol use disorder.      Pt is s/p liver transplant in 10/2017 due to acute alcoholic hepatitis. He has a hx of heavy alcohol use since his teens. He required emergent liver transplantation prior to being able to undergo any type of substance abuse treatment. It was recommended that pt attend the ABU program after he received his liver transplant as he was deemed to be very high risk for relapse post-transplant. However pt never followed up in the ABU due to a number of post-op medical complications including ESRD requiring HD (Mon/Fri). In 2018 the addiction psychiatry team was consulted while pt was admitted to the hospital due to concerns for opioid addiction. Pt was started on Suboxone and again recommended to f/u with the ABU for substance abuse treatment.      Today pt reports that he is feeling well physically. He confirms hx of alcohol use since the age of 18. He escalated to drinking a fifth of liquor daily in his early to mid twenties. He attempted to cut back a number of times but was unsuccessful. He has attended AA meetings in the past but has never been to a formal rehab program. He minimizes the effects that alcohol had in his life as he states that he mostly drank by himself, so it did not affect his relationships. He does admit that alcohol was a problem for him however, and recognizes the need for continued life long sobriety following his liver transplant.      Regarding his opiate use, pt reports using 10 mg of oxycontin every 4 hours as was prescribed to him. He reports that he began getting prescriptions for opiates a few months prior to his transplant, and continued to use them until this  "month when he was transitioned to Suboxone. He denies ever buying opiates or using meds which were not prescribed to him. He denies ever injecting or snorting them. He does report feeling better both physically and mentally on Suboxone. He reports feeling like his head is clearer and pain is better controlled. He denies any current side effects from Suboxone or cravings to use other opiates.      Last drink/use: Last use of alcohol 9/2017. Last use of narcotic pain medications (other than Suboxone) 6/14/18  Average consumption: drank a fifth of liquor daily. Used Oxycontin 10 mg PO q4h  Withdrawal: yes, hx of tremors and anxiety from alcohol withdrawal. No hx of complicated alcohol withdrawal or opiate withdrawal  Spouse/partner consumption: n/a  Rehab/Detox: no  AA/NA: yes, has been to AA before. Went for approx 1 year prior to his transplant. States "not a fan" due to Mandaeism aspect    SUBJECTIVE:     Interval Hx: ((transcribed by Student Doctor Jones)  We opened the conversation regarding his kidney, he states that he was told that he does not need the transplant as per nephrology. He reports that he doesn't want to get kidney transplant as well, but if it's needed he is willing to have it. He reports that he is "terrified" to stop from Suboxone due to the pain he experienced.   [facility]. He talks about his experience about the meeting yesterday and thinks is was "weird." He thinks that  was too confrontational, and he was lost from his talks. There were also two new attendees from the meeting were given incoherent talks and that made him uncomfortable during the meeting last night.    Patients talks about his mom and that she needs to get a job and goes on to explains that the family is eager to return his normal life.     Medication Side Effects: None  Cravings: Denies  No other acute psychiatric issues reported at this time.    Scheduled Meds:   Current Outpatient " Prescriptions on File Prior to Encounter   Medication Sig Dispense Refill    amLODIPine (NORVASC) 5 MG tablet Take 1 tablet (5 mg total) by mouth once daily. 30 tablet 11    bisacodyl (DULCOLAX) 5 mg EC tablet Take 2 tablets (10 mg total) by mouth daily as needed for Constipation. 60 tablet 5    buprenorphine-naloxone 2-0.5 mg (SUBOXONE) 2-0.5 mg Film Take 2 film sublingually in the morning and 1 film sublingually in the evening 90 each 0    eltrombopag (PROMACTA) 50 MG Tab Take 1 tablet (50 mg total) by mouth once daily. 30 tablet 11    ergocalciferol (VITAMIN D2) 50,000 unit Cap take 1 capsule by mouth every 7 days 4 capsule 2    famotidine (PEPCID) 20 MG tablet Take 1 tablet (20 mg total) by mouth every evening. 30 tablet 11    folic acid (FOLVITE) 1 MG tablet Take 1 tablet (1 mg total) by mouth once daily. 30 tablet 2    levothyroxine (SYNTHROID) 75 MCG tablet Take 1 tablet (75 mcg total) by mouth before breakfast. 30 tablet 11    ondansetron (ZOFRAN-ODT) 8 MG TbDL Take 1 tablet (8 mg total) by mouth every 8 (eight) hours as needed (nausea). 30 tablet 1    predniSONE (DELTASONE) 20 MG tablet Take daily by mouth daily: 60mg 6/4-6/10, 50mg (2.5 tab) 6/11-6/17, 40mg (2 tab) 6/18-6/24, 30mg (1.5 tab) 6/25-7/1, 20 mg 7/2/18 75 tablet 11    sevelamer carbonate (RENVELA) 800 mg Tab Take 2 tablets (1,600 mg total) by mouth 3 (three) times daily with meals. 180 tablet 11    sirolimus (RAPAMUNE) 1 MG Tab Take 1 tablet (1 mg total) by mouth once daily. 30 tablet 11    sirolimus 0.5 mg Tab Take 1 tablet (0.5 mg total) by mouth once daily. Patient to take a total of 1.5mg dose daily ( One 1mg tablet, and one 0.5 tablet) 30 tablet 11    sodium bicarbonate 650 MG tablet Take 1 tablet (650 mg total) by mouth 2 (two) times daily. 60 tablet 11    torsemide (DEMADEX) 20 MG Tab Take 3 tablets (60 mg total) by mouth once daily. 90 tablet 2    ursodiol (ACTIGALL) 300 mg capsule Take 1 capsule (300 mg total) by mouth 2  "(two) times daily. 60 capsule 11     No current facility-administered medications on file prior to encounter.      Allergies:  Bactrim [sulfamethoxazole-trimethoprim]    Medical ROS:  See H&P dated 6/29/18 for ROS.     OBJECTIVE:     Vital Signs (Most Recent):  Vitals:    07/25/18 1010   BP: 136/76   Pulse: 108   Resp: 16       Mental Status Exam:  Appearance: casually dressed, thin, using cane for ambulation  Behavior/Cooperation: eye contact normal, but guarded  Speech: normal tone, normal rate, normal pitch, normal volume  Mood "pretty good"  Affect: Mood congruent  Thought Process: goal-directed,   Thought Content: normal, no suicidality, no homicidality, delusions, or paranoia  Orientation: grossly intact  Memory: Grossly intact  Attention Span/Concentration: Normal  Cognition: grossly intact  Insight: poor, minimizes severity of alcohol and opiate use  Judgment: poor    Laboratory:  Recent Results (from the past 168 hour(s))   POCT BREATH ALCOHOL TEST    Collection Time: 07/19/18 11:54 AM   Result Value Ref Range    Breath Alcohol 0.000    Toxicology screen, urine    Collection Time: 07/20/18 10:41 AM   Result Value Ref Range    Alcohol, Urine <10 <10 mg/dL    Benzodiazepines Negative     Methadone metabolites Negative     Cocaine (Metab.) Negative     Opiate Scrn, Ur Negative     Barbiturate Screen, Ur Negative     Amphetamine Screen, Ur Negative     THC Negative     Phencyclidine Negative     Creatinine, Random Ur 50.0 23.0 - 375.0 mg/dL    Toxicology Information SEE COMMENT    POCT BREATH ALCOHOL TEST    Collection Time: 07/20/18 10:43 AM   Result Value Ref Range    Breath Alcohol 0.000    Renal function panel    Collection Time: 07/23/18  7:32 AM   Result Value Ref Range    Glucose 119 (H) 70 - 110 mg/dL    Sodium 141 136 - 145 mmol/L    Potassium 4.1 3.5 - 5.1 mmol/L    Chloride 106 95 - 110 mmol/L    CO2 22 (L) 23 - 29 mmol/L    BUN, Bld 78 (H) 6 - 20 mg/dL    Calcium 9.4 8.7 - 10.5 mg/dL    Creatinine " 2.6 (H) 0.5 - 1.4 mg/dL    Albumin 3.2 (L) 3.5 - 5.2 g/dL    Phosphorus 5.4 (H) 2.7 - 4.5 mg/dL    eGFR if  36.9 (A) >60 mL/min/1.73 m^2    eGFR if non  31.9 (A) >60 mL/min/1.73 m^2    Anion Gap 13 8 - 16 mmol/L   POCT BREATH ALCOHOL TEST    Collection Time: 07/23/18 10:55 AM   Result Value Ref Range    Breath Alcohol 0.000    Bilirubin, direct    Collection Time: 07/24/18  7:55 AM   Result Value Ref Range    Bilirubin, Direct 0.3 0.1 - 0.3 mg/dL   Sirolimus level    Collection Time: 07/24/18  7:55 AM   Result Value Ref Range    Sirolimus Lvl 5.6 4.0 - 20.0 ng/mL   CBC auto differential    Collection Time: 07/24/18  7:55 AM   Result Value Ref Range    WBC 4.87 3.90 - 12.70 K/uL    RBC 3.63 (L) 4.60 - 6.20 M/uL    Hemoglobin 11.2 (L) 14.0 - 18.0 g/dL    Hematocrit 35.1 (L) 40.0 - 54.0 %    MCV 97 82 - 98 fL    MCH 30.9 27.0 - 31.0 pg    MCHC 31.9 (L) 32.0 - 36.0 g/dL    RDW 14.4 11.5 - 14.5 %    Platelets 56 (L) 150 - 350 K/uL    MPV 11.5 9.2 - 12.9 fL    Immature Granulocytes 1.0 (H) 0.0 - 0.5 %    Gran # (ANC) 3.2 1.8 - 7.7 K/uL    Immature Grans (Abs) 0.05 (H) 0.00 - 0.04 K/uL    Lymph # 1.1 1.0 - 4.8 K/uL    Mono # 0.5 0.3 - 1.0 K/uL    Eos # 0.0 0.0 - 0.5 K/uL    Baso # 0.03 0.00 - 0.20 K/uL    nRBC 0 0 /100 WBC    Gran% 66.2 38.0 - 73.0 %    Lymph% 22.2 18.0 - 48.0 %    Mono% 9.4 4.0 - 15.0 %    Eosinophil% 0.6 0.0 - 8.0 %    Basophil% 0.6 0.0 - 1.9 %    Differential Method Automated    Comprehensive metabolic panel    Collection Time: 07/24/18  7:55 AM   Result Value Ref Range    Sodium 140 136 - 145 mmol/L    Potassium 3.7 3.5 - 5.1 mmol/L    Chloride 107 95 - 110 mmol/L    CO2 20 (L) 23 - 29 mmol/L    Glucose 123 (H) 70 - 110 mg/dL    BUN, Bld 80 (H) 6 - 20 mg/dL    Creatinine 2.6 (H) 0.5 - 1.4 mg/dL    Calcium 9.6 8.7 - 10.5 mg/dL    Total Protein 7.4 6.0 - 8.4 g/dL    Albumin 3.4 (L) 3.5 - 5.2 g/dL    Total Bilirubin 0.6 0.1 - 1.0 mg/dL    Alkaline Phosphatase 175 (H) 55 -  135 U/L    AST 24 10 - 40 U/L    ALT 26 10 - 44 U/L    Anion Gap 13 8 - 16 mmol/L    eGFR if African American 36.9 (A) >60 mL/min/1.73 m^2    eGFR if non  31.9 (A) >60 mL/min/1.73 m^2   POCT BREATH ALCOHOL TEST    Collection Time: 07/24/18 11:52 AM   Result Value Ref Range    Breath Alcohol 0.000    POCT BREATH ALCOHOL TEST    Collection Time: 07/25/18 10:11 AM   Result Value Ref Range    Breath Alcohol 0.000      ASSESSMENT:     Alcohol use disorder, severe, in early remission  Opioid use disorder, mild  Unspecified depression (MDD vs. Adjustment disorder)    Patient remains high risk of relapse on opiates, insight and judgement remain poor. Requires continued monitoring in IOP at this time. Currently on Suboxone for opiate agonist treatment.  Patient with possible upcoming kidney transplant which would cause risk for relapse on opiates.     PLAN:     · Continue patient on ABU protocol.  · Breathalyzer and urine toxicology daily.  · VS daily x3 days.  · Patient counseled on abstinence from alcohol, opiates (other than Suboxone), and all other drugs of abuse.    Medications: Continue current medications. The risks and benefits of medication were discussed with the patient.  · Continue Suboxone 4 mg qAM and 2 mg QHS for opiate use disorder and pain    ·  Discontinued by Dr. Rogers on 07/23/18    Status: Continue treatment on ABU    Patient's Intervention Response: guarded    Case discussed with Dr. Noel Hall, DO  [facility]   PGY-2

## 2018-07-26 ENCOUNTER — HOSPITAL ENCOUNTER (OUTPATIENT)
Dept: PSYCHIATRY | Facility: HOSPITAL | Age: 28
Discharge: HOME | End: 2018-07-26
Attending: PSYCHIATRY & NEUROLOGY
Payer: COMMERCIAL

## 2018-07-26 ENCOUNTER — TELEPHONE (OUTPATIENT)
Dept: TRANSPLANT | Facility: CLINIC | Age: 28
End: 2018-07-26

## 2018-07-26 DIAGNOSIS — F10.21 ALCOHOL USE DISORDER, SEVERE, IN EARLY REMISSION: Primary | ICD-10-CM

## 2018-07-26 DIAGNOSIS — Z76.82 ORGAN TRANSPLANT CANDIDATE: Primary | ICD-10-CM

## 2018-07-26 DIAGNOSIS — R45.89 ANXIETY ABOUT HEALTH: ICD-10-CM

## 2018-07-26 LAB — BREATH ALCOHOL: 0

## 2018-07-26 PROCEDURE — 90853 GROUP PSYCHOTHERAPY: CPT | Mod: NTX | Performed by: SOCIAL WORKER

## 2018-07-26 PROCEDURE — 90853 GROUP PSYCHOTHERAPY: CPT | Mod: NTX

## 2018-07-26 PROCEDURE — 90853 GROUP PSYCHOTHERAPY: CPT | Mod: ,,, | Performed by: PSYCHOLOGIST

## 2018-07-26 NOTE — PROGRESS NOTES
Group Psychotherapy (PhD/LCSW)    Site: [facility]    Clinical status of patient: Intensive Outpatient Program (IOP)    Date: 7/26/2018    Group Focus:  ACT Group Therapy    Length of service: 25239 - 45-50 minutes    Number of patients in attendance: 14    Referred by: Addictive Behavior Unit Treatment Team    Target symptoms: Alcohol Abuse    Patient's response to treatment: Active Listening; Self-disclosure     Progress toward goals: Progressing adequately    Interval History:  Session focus was Avoidance and Acceptance.  Patients were encouraged to identify a pain-provoking target and generate scenarios to practice acceptance.    Diagnosis: Alcohol Use Disorder, in early remission    Plan: Continue treatment on ABU

## 2018-07-26 NOTE — PLAN OF CARE
07/26/18 1000   Activity/Group Therapy Checklist   Group Educational  (codependency )   Attendance Attended   Follows Direction Followed directions   Group Interactions/Observations Interacted appropriately   Affect/Mood Range Normal range   Affect/Mood Display Appropriate   Goal Progression Progressing

## 2018-07-26 NOTE — TELEPHONE ENCOUNTER
----- Message from Georgina Rogers MD sent at 7/23/2018  8:55 AM CDT -----  HD stopped since last week, good UOP. He will see KTM next month. I would still want him to see KTM though he may not need or qualify KT anymore. He is feeling stronger. He is ok to drive. I filled out DMV form. He will be done with ABU in early August. Return visit in mid August. He plans to return home in [place] in mid August.  I stopped Keppra, Remiron and Marinol. He has not been taking Remiron and Marinol anyway.    Recommendation(s):  - continue suboxome as prescribed/recommended by Dr. Bucio  - continue sirolimus (rapamune) and other medications, no change  - continue enhanced nutrition with protein supplements  - stop leviteracetam  - stop Remiron and Marinol  
noted  
1 pair

## 2018-07-26 NOTE — TELEPHONE ENCOUNTER
Called pt's mother and reviewed labs and clinic visit with her. Labs are stable. No medication adjustments. Next labs due on Monday.

## 2018-07-26 NOTE — TELEPHONE ENCOUNTER
----- Message from Georgina Rogers MD sent at 7/23/2018  8:55 AM CDT -----  HD stopped since last week, good UOP. He will see KTM next month. I would still want him to see KTM though he may not need or qualify KT anymore. He is feeling stronger. He is ok to drive. I filled out DMV form. He will be done with ABU in early August. Return visit in mid August. He plans to return home in [place] in mid August.  I stopped Keppra, Remiron and Marinol. He has not been taking Remiron and Marinol anyway.    Recommendation(s):  - continue suboxome as prescribed/recommended by Dr. Bucio  - continue sirolimus (rapamune) and other medications, no change  - continue enhanced nutrition with protein supplements  - stop leviteracetam  - stop Remiron and Marinol

## 2018-07-26 NOTE — TELEPHONE ENCOUNTER
----- Message from Georgina Rogers MD sent at 7/24/2018  4:04 PM CDT -----  Labs stable, sirolimus level:5.6. No change.

## 2018-07-26 NOTE — TELEPHONE ENCOUNTER
----- Message from Frances Napier sent at 7/11/2018  6:05 PM CDT -----  He is FC and we will booking him soon, will probably need to finish his ABU first, but process began. If he fails ABU, SW may not allow him in for kidney transplant eval.

## 2018-07-26 NOTE — PROGRESS NOTES
Group Psychotherapy (PhD/LCSW)    Site: [facility]    Clinical status of patient: Intensive Outpatient Program (IOP)    Date: 7/26/2018    Group Focus: Psychodynamic Group Psychotherapy    Length of service: 19606 - 45-50 minutes    Number of patients in attendance: 7    Referred by: Addictive Behavior Unit Treatment Team    Target symptoms: Alcohol Abuse    Patient's response to treatment: Active Listening and Self-disclosure    Progress toward goals: Progressing adequately    Interval History: Shared his story with new group member.    Diagnosis: alcohol use disorder, severe, in early remission    Plan: Continue treatment on ABU

## 2018-07-27 ENCOUNTER — HOSPITAL ENCOUNTER (OUTPATIENT)
Dept: PSYCHIATRY | Facility: HOSPITAL | Age: 28
Discharge: HOME | End: 2018-07-27
Attending: PSYCHIATRY & NEUROLOGY
Payer: COMMERCIAL

## 2018-07-27 ENCOUNTER — OFFICE VISIT (OUTPATIENT)
Dept: HEMATOLOGY/ONCOLOGY | Facility: CLINIC | Age: 28
End: 2018-07-27
Payer: COMMERCIAL

## 2018-07-27 VITALS
DIASTOLIC BLOOD PRESSURE: 75 MMHG | BODY MASS INDEX: 25.61 KG/M2 | WEIGHT: 159.38 LBS | TEMPERATURE: 99 F | HEIGHT: 66 IN | SYSTOLIC BLOOD PRESSURE: 120 MMHG | OXYGEN SATURATION: 97 % | RESPIRATION RATE: 16 BRPM | HEART RATE: 102 BPM | HEART RATE: 97 BPM | DIASTOLIC BLOOD PRESSURE: 70 MMHG | SYSTOLIC BLOOD PRESSURE: 128 MMHG

## 2018-07-27 DIAGNOSIS — D63.1 ANEMIA OF CHRONIC RENAL FAILURE, STAGE 5: ICD-10-CM

## 2018-07-27 DIAGNOSIS — D69.6 THROMBOCYTOPENIA: Primary | ICD-10-CM

## 2018-07-27 DIAGNOSIS — F10.21 ALCOHOL USE DISORDER, SEVERE, IN EARLY REMISSION: Primary | ICD-10-CM

## 2018-07-27 DIAGNOSIS — N18.5 ANEMIA OF CHRONIC RENAL FAILURE, STAGE 5: ICD-10-CM

## 2018-07-27 DIAGNOSIS — Z79.60 LONG-TERM USE OF IMMUNOSUPPRESSANT MEDICATION: Chronic | ICD-10-CM

## 2018-07-27 DIAGNOSIS — R45.89 ANXIETY ABOUT HEALTH: ICD-10-CM

## 2018-07-27 LAB
AMPHET+METHAMPHET UR QL: NEGATIVE
BARBITURATES UR QL SCN>200 NG/ML: NEGATIVE
BENZODIAZ UR QL SCN>200 NG/ML: NEGATIVE
BREATH ALCOHOL: 0
BZE UR QL SCN: NEGATIVE
CANNABINOIDS UR QL SCN: NEGATIVE
CREAT UR-MCNC: 51 MG/DL
ETHANOL UR-MCNC: <10 MG/DL
METHADONE UR QL SCN>300 NG/ML: NEGATIVE
OPIATES UR QL SCN: NEGATIVE
PCP UR QL SCN>25 NG/ML: NEGATIVE
TOXICOLOGY INFORMATION: NORMAL

## 2018-07-27 PROCEDURE — 90853 GROUP PSYCHOTHERAPY: CPT | Mod: ,,, | Performed by: PSYCHOLOGIST

## 2018-07-27 PROCEDURE — 90853 GROUP PSYCHOTHERAPY: CPT

## 2018-07-27 PROCEDURE — 90853 GROUP PSYCHOTHERAPY: CPT | Mod: NTX | Performed by: SOCIAL WORKER

## 2018-07-27 PROCEDURE — 99999 PR PBB SHADOW E&M-EST. PATIENT-LVL III: CPT | Mod: PBBFAC,,, | Performed by: INTERNAL MEDICINE

## 2018-07-27 PROCEDURE — 99214 OFFICE O/P EST MOD 30 MIN: CPT | Mod: S$GLB,,, | Performed by: INTERNAL MEDICINE

## 2018-07-27 PROCEDURE — 80307 DRUG TEST PRSMV CHEM ANLYZR: CPT | Mod: NTX

## 2018-07-27 NOTE — PROGRESS NOTES
2018 5:32 PM  Name: Jhonny Diana  : 1990  Start Date: 18    [facility] Intensive Outpatient Program   Progress Note    Status: Intensive Outpatient Program (IOP)    HPI:  Jhonny Diana is a 27 y.o. male who presents for admission to [facility] with principal problem of opioid use, as well as hx of alcohol use disorder.      Pt is s/p liver transplant in 10/2017 due to acute alcoholic hepatitis. He has a hx of heavy alcohol use since his teens. He required emergent liver transplantation prior to being able to undergo any type of substance abuse treatment. It was recommended that pt attend the ABU program after he received his liver transplant as he was deemed to be very high risk for relapse post-transplant. However pt never followed up in the ABU due to a number of post-op medical complications including ESRD requiring HD (Mon/Fri). In 2018 the addiction psychiatry team was consulted while pt was admitted to the hospital due to concerns for opioid addiction. Pt was started on Suboxone and again recommended to f/u with the [facility]BU for substance abuse treatment.      Today pt reports that he is feeling well physically. He confirms hx of alcohol use since the age of 18. He escalated to drinking a fifth of liquor daily in his early to mid twenties. He attempted to cut back a number of times but was unsuccessful. He has attended AA meetings in the past but has never been to a formal rehab program. He minimizes the effects that alcohol had in his life as he states that he mostly drank by himself, so it did not affect his relationships. He does admit that alcohol was a problem for him however, and recognizes the need for continued life long sobriety following his liver transplant.      Regarding his opiate use, pt reports using 10 mg of oxycontin every 4 hours as was prescribed to him. He reports that he began getting prescriptions for opiates a few months prior to his transplant, and continued to use them until this  "month when he was transitioned to Suboxone. He denies ever buying opiates or using meds which were not prescribed to him. He denies ever injecting or snorting them. He does report feeling better both physically and mentally on Suboxone. He reports feeling like his head is clearer and pain is better controlled. He denies any current side effects from Suboxone or cravings to use other opiates.      Last drink/use: Last use of alcohol 9/2017. Last use of narcotic pain medications (other than Suboxone) 6/14/18  Average consumption: drank a fifth of liquor daily. Used Oxycontin 10 mg PO q4h  Withdrawal: yes, hx of tremors and anxiety from alcohol withdrawal. No hx of complicated alcohol withdrawal or opiate withdrawal  Spouse/partner consumption: n/a  Rehab/Detox: no  AA/NA: yes, has been to AA before. Went for approx 1 year prior to his transplant. States "not a fan" due to Qu[employer] aspect    SUBJECTIVE:     Interval Hx:   States that he has appointment with Dr. Maurice with Hem/Onc this morning to discuss starting platelet medication. States that he has been told that he will be able to have his port removed next week since he does not need to have HD any more at this time.  States that he is still waiting for follow up with kidney transplant doctors about whether or not he will need kidney transplant.  States that he will likely not need one at this point since his kidney function has been improving some.  Reports going to AA meetings and denies any cravings for alcohol.  States that he had been a "stress-drinker" initially.  Then stated that the thought about using something that would get rid of his stress and help him not to think about things is appealling.  States that his social security has now gone through so he will be getting that in addition to his long term disability    Medication Side Effects: None  Cravings: Denied cravings for alcohol, however then reported that the thought about using something that " would get rid of his stress and help him not to think about things would be appealling  No other acute psychiatric issues reported at this time.    Scheduled Meds:   Current Outpatient Prescriptions on File Prior to Encounter   Medication Sig Dispense Refill    amLODIPine (NORVASC) 5 MG tablet Take 1 tablet (5 mg total) by mouth once daily. 30 tablet 11    bisacodyl (DULCOLAX) 5 mg EC tablet Take 2 tablets (10 mg total) by mouth daily as needed for Constipation. 60 tablet 5    buprenorphine-naloxone 2-0.5 mg (SUBOXONE) 2-0.5 mg Film Take 2 film sublingually in the morning and 1 film sublingually in the evening 90 each 0    eltrombopag (PROMACTA) 50 MG Tab Take 1 tablet (50 mg total) by mouth once daily. 30 tablet 11    ergocalciferol (VITAMIN D2) 50,000 unit Cap take 1 capsule by mouth every 7 days 4 capsule 2    famotidine (PEPCID) 20 MG tablet Take 1 tablet (20 mg total) by mouth every evening. 30 tablet 11    folic acid (FOLVITE) 1 MG tablet Take 1 tablet (1 mg total) by mouth once daily. 30 tablet 2    levothyroxine (SYNTHROID) 75 MCG tablet Take 1 tablet (75 mcg total) by mouth before breakfast. 30 tablet 11    ondansetron (ZOFRAN-ODT) 8 MG TbDL Take 1 tablet (8 mg total) by mouth every 8 (eight) hours as needed (nausea). 30 tablet 1    predniSONE (DELTASONE) 20 MG tablet Take daily by mouth daily: 60mg 6/4-6/10, 50mg (2.5 tab) 6/11-6/17, 40mg (2 tab) 6/18-6/24, 30mg (1.5 tab) 6/25-7/1, 20 mg 7/2/18 75 tablet 11    sevelamer carbonate (RENVELA) 800 mg Tab Take 2 tablets (1,600 mg total) by mouth 3 (three) times daily with meals. 180 tablet 11    sirolimus (RAPAMUNE) 1 MG Tab Take 1 tablet (1 mg total) by mouth once daily. 30 tablet 11    sirolimus 0.5 mg Tab Take 1 tablet (0.5 mg total) by mouth once daily. Patient to take a total of 1.5mg dose daily ( One 1mg tablet, and one 0.5 tablet) 30 tablet 11    sodium bicarbonate 650 MG tablet Take 1 tablet (650 mg total) by mouth 2 (two) times daily. 60  "tablet 11    torsemide (DEMADEX) 20 MG Tab Take 3 tablets (60 mg total) by mouth once daily. 90 tablet 2    ursodiol (ACTIGALL) 300 mg capsule Take 1 capsule (300 mg total) by mouth 2 (two) times daily. 60 capsule 11     No current facility-administered medications on file prior to encounter.      Allergies:  Bactrim [sulfamethoxazole-trimethoprim]    Medical ROS:  See H&P dated 6/29/18 for ROS.     OBJECTIVE:     Vital Signs (Most Recent):  There were no vitals filed for this visit.    Mental Status Exam:  Appearance: casually dressed, thin, using cane for ambulation  Behavior/Cooperation: cooperative, eye contact normal  Speech: normal tone, normal rate, normal pitch, normal volume  Mood "good"  Affect: Mood congruent, Appropriate  Thought Process: goal-directed  Thought Content: normal, no suicidality, no homicidality, delusions, or paranoia  Orientation: grossly intact  Memory: Grossly intact  Attention Span/Concentration: Normal  Cognition: grossly intact  Insight: limited  Judgment: limited    Laboratory:  Recent Results (from the past 168 hour(s))   Toxicology screen, urine    Collection Time: 07/20/18 10:41 AM   Result Value Ref Range    Alcohol, Urine <10 <10 mg/dL    Benzodiazepines Negative     Methadone metabolites Negative     Cocaine (Metab.) Negative     Opiate Scrn, Ur Negative     Barbiturate Screen, Ur Negative     Amphetamine Screen, Ur Negative     THC Negative     Phencyclidine Negative     Creatinine, Random Ur 50.0 23.0 - 375.0 mg/dL    Toxicology Information SEE COMMENT    POCT BREATH ALCOHOL TEST    Collection Time: 07/20/18 10:43 AM   Result Value Ref Range    Breath Alcohol 0.000    Renal function panel    Collection Time: 07/23/18  7:32 AM   Result Value Ref Range    Glucose 119 (H) 70 - 110 mg/dL    Sodium 141 136 - 145 mmol/L    Potassium 4.1 3.5 - 5.1 mmol/L    Chloride 106 95 - 110 mmol/L    CO2 22 (L) 23 - 29 mmol/L    BUN, Bld 78 (H) 6 - 20 mg/dL    Calcium 9.4 8.7 - 10.5 mg/dL    " Creatinine 2.6 (H) 0.5 - 1.4 mg/dL    Albumin 3.2 (L) 3.5 - 5.2 g/dL    Phosphorus 5.4 (H) 2.7 - 4.5 mg/dL    eGFR if  36.9 (A) >60 mL/min/1.73 m^2    eGFR if non  31.9 (A) >60 mL/min/1.73 m^2    Anion Gap 13 8 - 16 mmol/L   POCT BREATH ALCOHOL TEST    Collection Time: 07/23/18 10:55 AM   Result Value Ref Range    Breath Alcohol 0.000    Bilirubin, direct    Collection Time: 07/24/18  7:55 AM   Result Value Ref Range    Bilirubin, Direct 0.3 0.1 - 0.3 mg/dL   Sirolimus level    Collection Time: 07/24/18  7:55 AM   Result Value Ref Range    Sirolimus Lvl 5.6 4.0 - 20.0 ng/mL   CBC auto differential    Collection Time: 07/24/18  7:55 AM   Result Value Ref Range    WBC 4.87 3.90 - 12.70 K/uL    RBC 3.63 (L) 4.60 - 6.20 M/uL    Hemoglobin 11.2 (L) 14.0 - 18.0 g/dL    Hematocrit 35.1 (L) 40.0 - 54.0 %    MCV 97 82 - 98 fL    MCH 30.9 27.0 - 31.0 pg    MCHC 31.9 (L) 32.0 - 36.0 g/dL    RDW 14.4 11.5 - 14.5 %    Platelets 56 (L) 150 - 350 K/uL    MPV 11.5 9.2 - 12.9 fL    Immature Granulocytes 1.0 (H) 0.0 - 0.5 %    Gran # (ANC) 3.2 1.8 - 7.7 K/uL    Immature Grans (Abs) 0.05 (H) 0.00 - 0.04 K/uL    Lymph # 1.1 1.0 - 4.8 K/uL    Mono # 0.5 0.3 - 1.0 K/uL    Eos # 0.0 0.0 - 0.5 K/uL    Baso # 0.03 0.00 - 0.20 K/uL    nRBC 0 0 /100 WBC    Gran% 66.2 38.0 - 73.0 %    Lymph% 22.2 18.0 - 48.0 %    Mono% 9.4 4.0 - 15.0 %    Eosinophil% 0.6 0.0 - 8.0 %    Basophil% 0.6 0.0 - 1.9 %    Differential Method Automated    Comprehensive metabolic panel    Collection Time: 07/24/18  7:55 AM   Result Value Ref Range    Sodium 140 136 - 145 mmol/L    Potassium 3.7 3.5 - 5.1 mmol/L    Chloride 107 95 - 110 mmol/L    CO2 20 (L) 23 - 29 mmol/L    Glucose 123 (H) 70 - 110 mg/dL    BUN, Bld 80 (H) 6 - 20 mg/dL    Creatinine 2.6 (H) 0.5 - 1.4 mg/dL    Calcium 9.6 8.7 - 10.5 mg/dL    Total Protein 7.4 6.0 - 8.4 g/dL    Albumin 3.4 (L) 3.5 - 5.2 g/dL    Total Bilirubin 0.6 0.1 - 1.0 mg/dL    Alkaline Phosphatase 175  (H) 55 - 135 U/L    AST 24 10 - 40 U/L    ALT 26 10 - 44 U/L    Anion Gap 13 8 - 16 mmol/L    eGFR if African American 36.9 (A) >60 mL/min/1.73 m^2    eGFR if non  31.9 (A) >60 mL/min/1.73 m^2   POCT BREATH ALCOHOL TEST    Collection Time: 07/24/18 11:52 AM   Result Value Ref Range    Breath Alcohol 0.000    Toxicology screen, urine    Collection Time: 07/25/18 10:10 AM   Result Value Ref Range    Alcohol, Urine <10 <10 mg/dL    Benzodiazepines Negative     Methadone metabolites Negative     Cocaine (Metab.) Negative     Opiate Scrn, Ur Negative     Barbiturate Screen, Ur Negative     Amphetamine Screen, Ur Negative     THC Negative     Phencyclidine Negative     Creatinine, Random Ur 28.0 23.0 - 375.0 mg/dL    Toxicology Information SEE COMMENT    POCT BREATH ALCOHOL TEST    Collection Time: 07/25/18 10:11 AM   Result Value Ref Range    Breath Alcohol 0.000    POCT BREATH ALCOHOL TEST    Collection Time: 07/26/18 11:32 AM   Result Value Ref Range    Breath Alcohol 0.000      ASSESSMENT:     Alcohol use disorder, severe, in early remission  Opioid use disorder, mild  Unspecified depression (MDD vs. Adjustment disorder)    Patient remains high risk of relapse on opiates, insight and judgement remain poor. Requires continued monitoring in IOP at this time. Currently on Suboxone for opiate agonist treatment.  Patient with possible upcoming kidney transplant which would cause risk for relapse on opiates.     PLAN:     · Continue patient on ABU protocol.  · Breathalyzer and urine toxicology daily.  · VS daily x3 days.  · Patient counseled on abstinence from alcohol, opiates (other than Suboxone), and all other drugs of abuse.    Medications: Continue current medications. The risks and benefits of medication were discussed with the patient.  · Continue Suboxone 4 mg qAM and 2 mg QHS for opiate use disorder and pain    · Remeron was discontinued by Dr. Rogers on 7/23/18    Status: Continue treatment on  [facility]    Patient's Intervention Response: guarded    Case discussed with Dr. Neema Langston MD  [facility]  PGY-4

## 2018-07-27 NOTE — PLAN OF CARE
07/27/18 1400   Activity/Group Therapy Checklist   Group Relapse Prevention  (Step Work )   Attendance Attended   Follows Direction Followed directions   Group Interactions/Observations Interacted appropriately   Affect/Mood Range Normal range   Affect/Mood Display Appropriate   Goal Progression Progressing

## 2018-07-27 NOTE — Clinical Note
Please schedule labs every two weeks for CBC x 4 lab visits.  Return in 2 months on same day as final lab visit.

## 2018-07-27 NOTE — PROGRESS NOTES
Group Psychotherapy (PhD/LCSW)    Site: [facility]    Clinical status of patient: Intensive Outpatient Program (IOP)    Date: 7/27/2018    Group Focus: Psychodynamic Group Psychotherapy    Length of service: 68300 - 45-50 minutes    Number of patients in attendance: 7    Referred by: Addictive Behavior Unit Treatment Team    Target symptoms: Alcohol Abuse    Patient's response to treatment: Active Listening and Self-disclosure    Progress toward goals: Progressing adequately    Interval History: Pt gave feedback from a member of the group being discharged today.He is planning low-key weekend: meetings; window shopping; and cooking     Diagnosis: alcohol use disorder, severe, in early remission    Plan: Continue treatment on [facility]BU

## 2018-07-30 ENCOUNTER — TELEPHONE (OUTPATIENT)
Dept: TRANSPLANT | Facility: CLINIC | Age: 28
End: 2018-07-30

## 2018-07-30 ENCOUNTER — HOSPITAL ENCOUNTER (OUTPATIENT)
Dept: PSYCHIATRY | Facility: HOSPITAL | Age: 28
Discharge: HOME | End: 2018-07-30
Attending: PSYCHIATRY & NEUROLOGY
Payer: COMMERCIAL

## 2018-07-30 VITALS — HEART RATE: 103 BPM | RESPIRATION RATE: 16 BRPM | SYSTOLIC BLOOD PRESSURE: 136 MMHG | DIASTOLIC BLOOD PRESSURE: 79 MMHG

## 2018-07-30 DIAGNOSIS — R45.89 ANXIETY ABOUT HEALTH: ICD-10-CM

## 2018-07-30 DIAGNOSIS — F10.21 ALCOHOL USE DISORDER, SEVERE, IN EARLY REMISSION: Primary | ICD-10-CM

## 2018-07-30 LAB
AMPHET+METHAMPHET UR QL: NEGATIVE
BARBITURATES UR QL SCN>200 NG/ML: NEGATIVE
BENZODIAZ UR QL SCN>200 NG/ML: NEGATIVE
BREATH ALCOHOL: 0
BZE UR QL SCN: NEGATIVE
CANNABINOIDS UR QL SCN: NEGATIVE
CREAT UR-MCNC: 37 MG/DL
ETHANOL UR-MCNC: <10 MG/DL
METHADONE UR QL SCN>300 NG/ML: NEGATIVE
OPIATES UR QL SCN: NEGATIVE
PCP UR QL SCN>25 NG/ML: NEGATIVE
TOXICOLOGY INFORMATION: NORMAL

## 2018-07-30 PROCEDURE — 90853 GROUP PSYCHOTHERAPY: CPT | Mod: NTX

## 2018-07-30 PROCEDURE — 90853 GROUP PSYCHOTHERAPY: CPT

## 2018-07-30 PROCEDURE — 90853 GROUP PSYCHOTHERAPY: CPT | Mod: 59,,, | Performed by: PSYCHOLOGIST

## 2018-07-30 PROCEDURE — 90853 GROUP PSYCHOTHERAPY: CPT | Mod: NTX | Performed by: SOCIAL WORKER

## 2018-07-30 PROCEDURE — 80307 DRUG TEST PRSMV CHEM ANLYZR: CPT | Mod: NTX

## 2018-07-30 NOTE — PROGRESS NOTES
Group Psychotherapy (PhD/LCSW)    Site: [facility]    Clinical status of patient: Intensive Outpatient Program (IOP)    Date: 7/30/2018    Group Focus: Communication Skills       Length of service: 14936 - 45-50 minutes    Number of patients in attendance: 13    Referred by: Addictive Behavior Unit Treatment Team    Target symptoms: Alcohol Abuse    Patient's response to treatment: Active Listening      Progress toward goals: Progressing adequately    Interval History: Discussed the way differences in gender, generation, family of origin background, and ethnicity can impair the communication process when not taken into account.     Diagnosis: alcohol use disorder, severe, in early remission    Plan: Continue treatment on ABU

## 2018-07-30 NOTE — PLAN OF CARE
07/30/18 1400   Activity/Group Therapy Checklist   Group Relapse Prevention  (Defense mechanisms)   Attendance Attended   Follows Direction Followed directions   Group Interactions/Observations Interacted appropriately;Sharing   Affect/Mood Range Normal range   Affect/Mood Display Appropriate   Goal Progression Progressing

## 2018-07-30 NOTE — TELEPHONE ENCOUNTER
Was able to get pt an appointment in the Hepatology clinic tomorrow afternoon at 1pm. Pt has c/o not feeling right with headache and sore back, a low grade temp today of 99.7. Notified pt's mother. Pt will also get labs in the am. Also sent a staff msg to pt's ABU treatment team. Instructed mother to take pt to ER if fever gets above 101.0. She states that she understands.

## 2018-07-30 NOTE — PROGRESS NOTES
Group Psychotherapy (PhD/LCSW)    Site: [facility]    Clinical status of patient: Intensive Outpatient Program (IOP)    Date: 7/30/2018    Group Focus: Psychodynamic Group Psychotherapy    Length of service: 59090 - 45-50 minutes    Number of patients in attendance: 6    Referred by: Addictive Behavior Unit Treatment Team    Target symptoms: Alcohol Abuse    Patient's response to treatment: Active Listening and Self-disclosure    Progress toward goals: Progressing adequately    Interval History: Pt described a low key weekend in which he spent time working on the logistical challenges about returning to [place].     Diagnosis: alcohol use disorder, severe, in early remission    Plan: Continue treatment on ABU

## 2018-07-30 NOTE — PLAN OF CARE
07/30/18 1000   Activity/Group Therapy Checklist   Group Educational  (grief/loss )   Attendance Attended   Follows Direction Followed directions   Group Interactions/Observations Interacted appropriately   Affect/Mood Range Normal range   Affect/Mood Display Appropriate   Goal Progression Progressing

## 2018-07-30 NOTE — PROGRESS NOTES
Transplant Hepatology  Liver Transplant Recipient Follow-up    Transplant Date: 10/19/2017  UNOS Native Liver Dx: Acute Alcoholic Hepatitis    Jhonny is here for follow up of his liver transplant.    ORGAN: LIVER  Whole or Partial: whole liver  Donor Type:  - brain death  CDC High Risk: no  Donor CMV Status: Positive  Donor HCV Status: Negative  Donor HBcAb: Negative  Donor HBV ANITA: Negative  Donor HCV ANITA: Negative  Biliary Anastomosis: end to end  Arterial Anatomy: standard  IVC reconstruction: end to end ivc  Portal vein status: patent    SEROLOGY Recipient/Donor : O/O CMV: +/ + HCV: -/- HBcAb: -/-    He has had the following complications since transplant: biliary stricture, renal insufficiency and chronic abdominal pain, thrombocytopenia, recurrent infections (3 post op admissions for fever and leukocytosis). The noted complications are well controlled. However, see below for acute issues    Subjective:     Interval History: Jhonny was last seen on 18. Currently, he is doing adequately. Previously complaints (until yesterday) include headache, sore back, temp 99.7, abdominal pain and intermittent nausea. However, patient reports significant improvement in symptoms in past 24 hours.     Last visit with Dr. Rogers 1 week ago, recommended continue suboxone (per Dr. Umana), stopped Keppra, Remeron and Marinol       INTERVAL HPI    Currently, today, no diarrhea, vomiting, no abd pain except with movement or palpation. Denies headaches, neck pain.     Previously (resolving yesterday), + nausea, fever (99.7, last fever @ 3pm yesterday), headaches, neck pain, LLQ abd pain. Denies diarrhea, constipation.    Maintaining fluid hydration: drinking ~ 70 ounces    Of note, also has noted BLE erythema and warmth noted on exam today. Reports redness and warmth not present on , pt and mother unsure when it started. + 2 pitting edema, although per pt report chronic and not worsening     He is attending ABU - the  last date is 8/9/18.  Last HD was 7/16/18 - Nephrology stopped HD due to improving renal function. He is gaining weight. He feels stronger. He uses a cane for support when climbing stairs. Otherwise, does not require use of equipment when ambulating.      Nephrology appt for kidney transplant evaluation scheduled for 8/14/18    Previous ERCP 7/2/18: One stent from the biliary tree was seen in the                         major papilla.                        - The major papilla appeared congested.                        - One stent was removed from the biliary tree.  Repeat ERCP prn for retreatment     Previous issues:  -- Multiple paracentesis (last 5/3/18, 6/8/18, diagnostic, 20-25 cc removed) as well as abscess drainage of perihepatic fluid collections.  Treated with antimicrobial therapy but no positive cultures    -- Biliary stricture also identified and ERCP performed on 12/6/2017 with sphincterotomy and biliary stent placed    -- Previous seizure - on Cyclosporine without recurrence of seizures, Cyclo changed to Rapa due to tardive dyskinesias? AMS and hemolytic anemia/thrombocytopenia    -- Chronic generalized abd pain - managed by Addiction psych, on suboxone     IS is currently sirolimus 1 mg daily.    PREVIOUS BIOPSIES:  -- Past liver biopsies:    4/30/2018: Suboptimal biopsy (total of 5 portal tracts)    Mild portal tract chronic inflammation, not-diagnostic of acute cellular    rejection    Focally reactive lobular changes    Negative CMV, no fibrosis, macrosteatosis   3/9/2018    No ACR, focal mild ductular reaction, mild cholestatic hepatitis    No steatosis, fibrosis, No CMV   1/23/18   No ACR, mild ductular reaction, No CMV, no fibrosis      US for BDB: 12 mo (due 10/2018)    History of rejection : n/a  Current Immunosuppression: currently taking Rapa 1.5 mg daily  -- previous immunosuppression regimens: Prograf (seizures), cyclosporine ( tardive dyskinesias? AMS and hemolytic  anemia/thrombocytopenia)    Liver allograft function: good below, pending repeat labs    Lab Results   Component Value Date    ALT 32 07/31/2018    AST 29 07/31/2018    ALKPHOS 168 (H) 07/31/2018    BILITOT 0.7 07/31/2018    ALBUMIN 3.3 (L) 07/31/2018    INR 0.9 07/27/2018    PLT 44 (L) 07/31/2018    CREATININE 2.3 (H) 07/31/2018    TACROLIMUS <1.5 (L) 03/04/2018       Review of Systems   Constitutional: Negative for activity change, appetite change, chills, diaphoresis, fatigue, fever and unexpected weight change.   HENT: Negative for congestion, postnasal drip and sinus pressure.    Eyes: Negative for visual disturbance.   Respiratory: Negative for cough, shortness of breath and wheezing.    Cardiovascular: Positive for leg swelling. Negative for chest pain.   Gastrointestinal: Positive for nausea (intermittent, improving). Negative for abdominal distention, abdominal pain, constipation, diarrhea, rectal pain and vomiting.   Endocrine: Negative for polydipsia and polyuria.   Genitourinary: Negative for decreased urine volume, difficulty urinating, dysuria, flank pain and urgency.   Musculoskeletal: Positive for gait problem (assisted with cane when climbing stairs, improving). Negative for back pain.   Skin: Positive for color change (redness to BLE). Negative for rash and wound.   Allergic/Immunologic: Positive for immunocompromised state.   Neurological: Negative for dizziness, tremors, syncope, weakness and headaches.   Hematological: Negative for adenopathy.   Psychiatric/Behavioral: Negative for agitation and confusion.       Objective:     Physical Exam   Constitutional: He is oriented to person, place, and time. He appears well-developed and well-nourished. No distress.   HENT:   Head: Normocephalic and atraumatic.   Eyes: EOM are normal. Pupils are equal, round, and reactive to light. No scleral icterus.   Neck: Normal range of motion. Neck supple.   Cardiovascular: Normal rate, regular rhythm and normal  heart sounds.  Exam reveals no friction rub.    No murmur heard.  Pulmonary/Chest: Effort normal. He has wheezes. He has no rales.   Abdominal: Soft. Bowel sounds are normal. He exhibits no distension and no mass. There is tenderness (mild tenderness to LLQ and midline ). There is no rebound and no guarding.   Musculoskeletal: Normal range of motion. He exhibits edema (+2 pitting BLE edema). He exhibits no tenderness.   Lymphadenopathy:     He has no cervical adenopathy.   Neurological: He is alert and oriented to person, place, and time.   Skin: Skin is warm and dry. Capillary refill takes less than 2 seconds. No rash noted. There is erythema (BLE erythema, warmth extending from ankles to mid-shin).   Psychiatric: He has a normal mood and affect. His behavior is normal. Judgment and thought content normal.     Lab Results   Component Value Date    BILITOT 0.6 07/27/2018    AST 26 07/27/2018    ALT 30 07/27/2018    ALKPHOS 165 (H) 07/27/2018    CREATININE 2.6 (H) 07/27/2018    ALBUMIN 3.5 07/27/2018     Lab Results   Component Value Date    WBC 6.13 07/27/2018    HGB 10.8 (L) 07/27/2018    HCT 31.9 (L) 07/27/2018    HCT 28 (L) 10/20/2017    PLT 60 (L) 07/27/2018     Lab Results   Component Value Date    TACROLIMUS <1.5 (L) 03/04/2018    CYCLOSPORINE <10 (L) 06/02/2018    SIROLIMUSLEV 3.3 (L) 07/27/2018       Assessment/Plan:     1. Left upper quadrant pain    2. S/P liver transplant    3. Cellulitis of lower extremity, unspecified laterality    4. Alcohol use disorder, severe, in early remission    5. Thrombocytopenia    6. Prophylactic immunotherapy    7. At risk for opportunistic infections    8. Long-term use of immunosuppressant medication        1. Abd pain - improved since yesterday. Denies abd pain except with significant movement or palpation. Much improved since yesterday  Generalized chronic abd pain much improved/controlled with change to Suboxone   Denies diarrhea, constipation, vomiting, no abd pain  except with movement or palpation. Denies headaches, neck pain, fever.     2. S/p liver transplant/ immunotherapy  -- on Rapa 1.5 mg brunson   --labs stable per Dr. Rogers  -- kidney function continue to improve     3. Possible cellulitis  -- erythema and warmth to BLE, no drainage  -- reports redness and warmth not present on 7/29, pt and mother unsure when it started. + 2 pitting edema, although per pt report chronic and not worsening     4. Alcohol use disorder  -- remains in ABU, reports doing well    5. Thrombocytopenia  -- followed by hem/onc     Discussed above case, updated labs with Dr. Rogers.     Plan  1. Doxycycline 100 mg BID x7 days    2. Add routine labs to labs already scheduled 8/9/18, then labs and f/u per protocol     SAMREEN GuillenOS Patient Status  Functional Status: 90% - Able to carry on normal activity: minor symptoms of disease  Physical Capacity: Limited Mobility (uses cane when climbing stairs)

## 2018-07-31 ENCOUNTER — OFFICE VISIT (OUTPATIENT)
Dept: TRANSPLANT | Facility: CLINIC | Age: 28
End: 2018-07-31
Payer: COMMERCIAL

## 2018-07-31 ENCOUNTER — HOSPITAL ENCOUNTER (OUTPATIENT)
Dept: PSYCHIATRY | Facility: HOSPITAL | Age: 28
Discharge: HOME | End: 2018-07-31
Attending: PSYCHIATRY & NEUROLOGY
Payer: COMMERCIAL

## 2018-07-31 VITALS
TEMPERATURE: 98 F | WEIGHT: 152.13 LBS | SYSTOLIC BLOOD PRESSURE: 137 MMHG | HEIGHT: 66 IN | OXYGEN SATURATION: 99 % | RESPIRATION RATE: 17 BRPM | HEART RATE: 108 BPM | DIASTOLIC BLOOD PRESSURE: 79 MMHG | BODY MASS INDEX: 24.45 KG/M2

## 2018-07-31 DIAGNOSIS — L03.119 CELLULITIS OF LOWER EXTREMITY, UNSPECIFIED LATERALITY: ICD-10-CM

## 2018-07-31 DIAGNOSIS — D69.6 THROMBOCYTOPENIA: ICD-10-CM

## 2018-07-31 DIAGNOSIS — F10.21 ALCOHOL USE DISORDER, SEVERE, IN EARLY REMISSION: ICD-10-CM

## 2018-07-31 DIAGNOSIS — Z91.89 AT RISK FOR OPPORTUNISTIC INFECTIONS: ICD-10-CM

## 2018-07-31 DIAGNOSIS — R10.12 LEFT UPPER QUADRANT PAIN: Primary | ICD-10-CM

## 2018-07-31 DIAGNOSIS — R45.89 ANXIETY ABOUT HEALTH: ICD-10-CM

## 2018-07-31 DIAGNOSIS — Z94.4 S/P LIVER TRANSPLANT: Chronic | ICD-10-CM

## 2018-07-31 DIAGNOSIS — Z29.89 PROPHYLACTIC IMMUNOTHERAPY: Chronic | ICD-10-CM

## 2018-07-31 DIAGNOSIS — Z79.60 LONG-TERM USE OF IMMUNOSUPPRESSANT MEDICATION: Chronic | ICD-10-CM

## 2018-07-31 PROBLEM — N18.6 ESRD (END STAGE RENAL DISEASE): Status: RESOLVED | Noted: 2018-01-13 | Resolved: 2018-07-31

## 2018-07-31 LAB — BREATH ALCOHOL: 0

## 2018-07-31 PROCEDURE — 99999 PR PBB SHADOW E&M-EST. PATIENT-LVL IV: CPT | Mod: PBBFAC,TXP,, | Performed by: NURSE PRACTITIONER

## 2018-07-31 PROCEDURE — 99214 OFFICE O/P EST MOD 30 MIN: CPT | Mod: S$GLB,TXP,, | Performed by: NURSE PRACTITIONER

## 2018-07-31 PROCEDURE — 99232 SBSQ HOSP IP/OBS MODERATE 35: CPT | Mod: ,,, | Performed by: PSYCHIATRY & NEUROLOGY

## 2018-07-31 PROCEDURE — 90853 GROUP PSYCHOTHERAPY: CPT | Mod: NTX | Performed by: SOCIAL WORKER

## 2018-07-31 PROCEDURE — 90853 GROUP PSYCHOTHERAPY: CPT | Mod: ,,, | Performed by: PSYCHOLOGIST

## 2018-07-31 PROCEDURE — 90853 GROUP PSYCHOTHERAPY: CPT | Mod: NTX

## 2018-07-31 RX ORDER — DOXYCYCLINE 100 MG/1
100 CAPSULE ORAL EVERY 12 HOURS
Qty: 14 CAPSULE | Refills: 0 | Status: SHIPPED | OUTPATIENT
Start: 2018-07-31 | End: 2018-08-14

## 2018-07-31 NOTE — PROGRESS NOTES
Group Psychotherapy (PhD/LCSW)    Site: [facility]    Clinical status of patient: Intensive Outpatient Program (IOP)    Date: 7/31/2018    Group Focus: ACT Group Psychotherapy    Length of service: 77823 - 45-50 minutes    Number of patients in attendance: 12    Referred by: Addictive Behavior Unit Treatment Team    Target symptoms: Alcohol Abuse    Patient's response to treatment: Active Listening and Self-disclosure    Progress toward goals: Progressing adequately    Interval History: Session focus was Fusion and Defusion.  Patients were introduced to defusion, relational frame theory, and defusion exercises.    Diagnosis: alcohol use disorder, severe, in early remission    Plan: Continue treatment on ABU

## 2018-07-31 NOTE — PLAN OF CARE
07/31/18 1400   Activity/Group Therapy Checklist   Group Goals/Reflection  (life story )   Attendance Attended   Follows Direction Followed directions   Group Interactions/Observations Interacted appropriately   Affect/Mood Range Normal range   Affect/Mood Display Appropriate   Goal Progression Progressing

## 2018-07-31 NOTE — LETTER
July 31, 2018        Michael Peterson  [address] [facility] [address]  Phone: [phone]  Fax: [phone]             [facility] - Liver Transplant  [address]  [facility] [address]  Phone: [phone]   Patient: Jhonny Diana   MR Number: [id number]   YOB: 1990   Date of Visit: 7/31/2018       Dear Dr. Michael Peterson    Thank you for referring Jhonny Diana to me for evaluation. Attached you will find relevant portions of my assessment and plan of care.    If you have questions, please do not hesitate to call me. I look forward to following Jhonny Diana along with you.    Sincerely,    Zakiya Alberto NP    Enclosure    If you would like to receive this communication electronically, please contact [email or web address] or [phone] to request [employer] access.    [employer] is a tool which provides read-only access to select patient information with whom you have a relationship. Its easy to use and provides real time access to review your patients record including encounter summaries, notes, results, and demographic information.    If you feel you have received this communication in error or would no longer like to receive these types of communications, please e-mail [email or web address]

## 2018-07-31 NOTE — PROGRESS NOTES
2018 5:32 PM  Name: Jhonny Diana  : 1990  Start Date: 18    [facility] Intensive Outpatient Program   Progress Note    Status: Intensive Outpatient Program (IOP)    HPI:  Jhonny Diana is a 27 y.o. male who presents for admission to [facility] with principal problem of opioid use, as well as hx of alcohol use disorder.      Pt is s/p liver transplant in 10/2017 due to acute alcoholic hepatitis. He has a hx of heavy alcohol use since his teens. He required emergent liver transplantation prior to being able to undergo any type of substance abuse treatment. It was recommended that pt attend the ABU program after he received his liver transplant as he was deemed to be very high risk for relapse post-transplant. However pt never followed up in the ABU due to a number of post-op medical complications including ESRD requiring HD (Mon/Fri). In 2018 the addiction psychiatry team was consulted while pt was admitted to the hospital due to concerns for opioid addiction. Pt was started on Suboxone and again recommended to f/u with the [facility]BU for substance abuse treatment.      Today pt reports that he is feeling well physically. He confirms hx of alcohol use since the age of 18. He escalated to drinking a fifth of liquor daily in his early to mid twenties. He attempted to cut back a number of times but was unsuccessful. He has attended AA meetings in the past but has never been to a formal rehab program. He minimizes the effects that alcohol had in his life as he states that he mostly drank by himself, so it did not affect his relationships. He does admit that alcohol was a problem for him however, and recognizes the need for continued life long sobriety following his liver transplant.      Regarding his opiate use, pt reports using 10 mg of oxycontin every 4 hours as was prescribed to him. He reports that he began getting prescriptions for opiates a few months prior to his transplant, and continued to use them until this  "month when he was transitioned to Suboxone. He denies ever buying opiates or using meds which were not prescribed to him. He denies ever injecting or snorting them. He does report feeling better both physically and mentally on Suboxone. He reports feeling like his head is clearer and pain is better controlled. He denies any current side effects from Suboxone or cravings to use other opiates.      Last drink/use: Last use of alcohol 9/2017. Last use of narcotic pain medications (other than Suboxone) 6/14/18  Average consumption: drank a fifth of liquor daily. Used Oxycontin 10 mg PO q4h  Withdrawal: yes, hx of tremors and anxiety from alcohol withdrawal. No hx of complicated alcohol withdrawal or opiate withdrawal  Spouse/partner consumption: n/a  Rehab/Detox: no  AA/NA: yes, has been to AA before. Went for approx 1 year prior to his transplant. States "not a fan" due to Taoism aspect    SUBJECTIVE:     Interval Hx:   Upon my evaluation, patient is linear, organized, alert and oriented x 4 with full range of affect. Endorses, neck pain, nausea and low grade fever yesterday, symptoms have since resolved.  Patient is still awaiting to see if he will need a kidney transplant. Reports normal sleep and appetite s/p discontinuation of mirtazapine. Patient doesn't like "sleep aids," "they make me feel weird." Patient feels "blah" today. Continues to be guarded and gives superficial answers.    Medication Side Effects: None  Cravings: Denied cravings for alcohol,   No other acute psychiatric issues reported at this time.    Scheduled Meds:   Current Outpatient Prescriptions on File Prior to Encounter   Medication Sig Dispense Refill    amLODIPine (NORVASC) 5 MG tablet Take 1 tablet (5 mg total) by mouth once daily. 30 tablet 11    bisacodyl (DULCOLAX) 5 mg EC tablet Take 2 tablets (10 mg total) by mouth daily as needed for Constipation. 60 tablet 5    buprenorphine-naloxone 2-0.5 mg (SUBOXONE) 2-0.5 mg Film Take 2 film " sublingually in the morning and 1 film sublingually in the evening 90 each 0    ergocalciferol (VITAMIN D2) 50,000 unit Cap take 1 capsule by mouth every 7 days 4 capsule 2    famotidine (PEPCID) 20 MG tablet Take 1 tablet (20 mg total) by mouth every evening. 30 tablet 11    folic acid (FOLVITE) 1 MG tablet Take 1 tablet (1 mg total) by mouth once daily. 30 tablet 2    levothyroxine (SYNTHROID) 75 MCG tablet Take 1 tablet (75 mcg total) by mouth before breakfast. 30 tablet 11    ondansetron (ZOFRAN-ODT) 8 MG TbDL Take 1 tablet (8 mg total) by mouth every 8 (eight) hours as needed (nausea). 30 tablet 1    predniSONE (DELTASONE) 20 MG tablet Take daily by mouth daily: 60mg 6/4-6/10, 50mg (2.5 tab) 6/11-6/17, 40mg (2 tab) 6/18-6/24, 30mg (1.5 tab) 6/25-7/1, 20 mg 7/2/18 75 tablet 11    sevelamer carbonate (RENVELA) 800 mg Tab Take 2 tablets (1,600 mg total) by mouth 3 (three) times daily with meals. 180 tablet 11    sirolimus (RAPAMUNE) 1 MG Tab Take 1 tablet (1 mg total) by mouth once daily. 30 tablet 11    sirolimus 0.5 mg Tab Take 1 tablet (0.5 mg total) by mouth once daily. Patient to take a total of 1.5mg dose daily ( One 1mg tablet, and one 0.5 tablet) 30 tablet 11    sodium bicarbonate 650 MG tablet Take 1 tablet (650 mg total) by mouth 2 (two) times daily. 60 tablet 11    torsemide (DEMADEX) 20 MG Tab Take 3 tablets (60 mg total) by mouth once daily. 90 tablet 2    ursodiol (ACTIGALL) 300 mg capsule Take 1 capsule (300 mg total) by mouth 2 (two) times daily. 60 capsule 11    [DISCONTINUED] eltrombopag (PROMACTA) 50 MG Tab Take 1 tablet (50 mg total) by mouth once daily. 30 tablet 11     No current facility-administered medications on file prior to encounter.      Allergies:  Bactrim [sulfamethoxazole-trimethoprim]    Medical ROS:  See H&P dated 6/29/18 for ROS.     OBJECTIVE:     Vital Signs (Most Recent):  There were no vitals filed for this visit.    Mental Status Exam:  Appearance: casually  "dressed,    Behavior/Cooperation: guarded, eye contact normal  Speech: normal tone, normal rate, normal pitch, normal volume  Mood "good"  Affect: Mood congruent, Appropriate  Thought Process: goal-directed  Thought Content: normal, no suicidality, no homicidality, delusions, or paranoia  Orientation: grossly intact  Memory: Grossly intact  Attention Span/Concentration: Normal  Cognition: grossly intact  Insight: limited  Judgment: limited    Laboratory:  Recent Results (from the past 168 hour(s))   Toxicology screen, urine    Collection Time: 07/25/18 10:10 AM   Result Value Ref Range    Alcohol, Urine <10 <10 mg/dL    Benzodiazepines Negative     Methadone metabolites Negative     Cocaine (Metab.) Negative     Opiate Scrn, Ur Negative     Barbiturate Screen, Ur Negative     Amphetamine Screen, Ur Negative     THC Negative     Phencyclidine Negative     Creatinine, Random Ur 28.0 23.0 - 375.0 mg/dL    Toxicology Information SEE COMMENT    POCT BREATH ALCOHOL TEST    Collection Time: 07/25/18 10:11 AM   Result Value Ref Range    Breath Alcohol 0.000    POCT BREATH ALCOHOL TEST    Collection Time: 07/26/18 11:32 AM   Result Value Ref Range    Breath Alcohol 0.000    AFP tumor marker    Collection Time: 07/27/18 10:37 AM   Result Value Ref Range    AFP 7.3 0.0 - 8.4 ng/mL   Protime-INR    Collection Time: 07/27/18 10:37 AM   Result Value Ref Range    Prothrombin Time 9.9 9.0 - 12.5 sec    INR 0.9 0.8 - 1.2   Sirolimus level    Collection Time: 07/27/18 10:37 AM   Result Value Ref Range    Sirolimus Lvl 3.3 (L) 4.0 - 20.0 ng/mL   Bilirubin, direct    Collection Time: 07/27/18 10:37 AM   Result Value Ref Range    Bilirubin, Direct 0.3 0.1 - 0.3 mg/dL   Rapid BMT CBC with Diff    Collection Time: 07/27/18 10:37 AM   Result Value Ref Range    WBC 6.13 3.90 - 12.70 K/uL    RBC 3.42 (L) 4.60 - 6.20 M/uL    Hemoglobin 10.8 (L) 14.0 - 18.0 g/dL    Hematocrit 31.9 (L) 40.0 - 54.0 %    MCV 93 82 - 98 fL    MCH 31.6 (H) 27.0 - " 31.0 pg    MCHC 33.9 32.0 - 36.0 g/dL    RDW 14.2 11.5 - 14.5 %    Platelets 60 (L) 150 - 350 K/uL    MPV 10.5 9.2 - 12.9 fL    Immature Granulocytes 0.5 0.0 - 0.5 %    Gran # (ANC) 4.0 1.8 - 7.7 K/uL    Immature Grans (Abs) 0.03 0.00 - 0.04 K/uL    Lymph # 1.2 1.0 - 4.8 K/uL    Mono # 0.8 0.3 - 1.0 K/uL    Eos # 0.1 0.0 - 0.5 K/uL    Baso # 0.02 0.00 - 0.20 K/uL    nRBC 0 0 /100 WBC    Gran% 65.5 38.0 - 73.0 %    Lymph% 20.2 18.0 - 48.0 %    Mono% 12.2 4.0 - 15.0 %    Eosinophil% 1.3 0.0 - 8.0 %    Basophil% 0.3 0.0 - 1.9 %    Differential Method Automated    Comprehensive metabolic panel    Collection Time: 07/27/18 10:37 AM   Result Value Ref Range    Sodium 140 136 - 145 mmol/L    Potassium 4.0 3.5 - 5.1 mmol/L    Chloride 106 95 - 110 mmol/L    CO2 20 (L) 23 - 29 mmol/L    Glucose 118 (H) 70 - 110 mg/dL    BUN, Bld 80 (H) 6 - 20 mg/dL    Creatinine 2.6 (H) 0.5 - 1.4 mg/dL    Calcium 9.7 8.7 - 10.5 mg/dL    Total Protein 7.4 6.0 - 8.4 g/dL    Albumin 3.5 3.5 - 5.2 g/dL    Total Bilirubin 0.6 0.1 - 1.0 mg/dL    Alkaline Phosphatase 165 (H) 55 - 135 U/L    AST 26 10 - 40 U/L    ALT 30 10 - 44 U/L    Anion Gap 14 8 - 16 mmol/L    eGFR if African American 36.9 (A) >60 mL/min/1.73 m^2    eGFR if non  31.9 (A) >60 mL/min/1.73 m^2   Toxicology screen, urine    Collection Time: 07/27/18 11:24 AM   Result Value Ref Range    Alcohol, Urine <10 <10 mg/dL    Benzodiazepines Negative     Methadone metabolites Negative     Cocaine (Metab.) Negative     Opiate Scrn, Ur Negative     Barbiturate Screen, Ur Negative     Amphetamine Screen, Ur Negative     THC Negative     Phencyclidine Negative     Creatinine, Random Ur 51.0 23.0 - 375.0 mg/dL    Toxicology Information SEE COMMENT    POCT BREATH ALCOHOL TEST    Collection Time: 07/27/18 11:25 AM   Result Value Ref Range    Breath Alcohol 0.000    Toxicology screen, urine    Collection Time: 07/30/18 10:51 AM   Result Value Ref Range    Alcohol, Urine <10 <10 mg/dL     Benzodiazepines Negative     Methadone metabolites Negative     Cocaine (Metab.) Negative     Opiate Scrn, Ur Negative     Barbiturate Screen, Ur Negative     Amphetamine Screen, Ur Negative     THC Negative     Phencyclidine Negative     Creatinine, Random Ur 37.0 23.0 - 375.0 mg/dL    Toxicology Information SEE COMMENT    POCT BREATH ALCOHOL TEST    Collection Time: 07/30/18 10:51 AM   Result Value Ref Range    Breath Alcohol 0.000    CBC auto differential    Collection Time: 07/31/18  8:48 AM   Result Value Ref Range    WBC 4.26 3.90 - 12.70 K/uL    RBC 3.51 (L) 4.60 - 6.20 M/uL    Hemoglobin 10.5 (L) 14.0 - 18.0 g/dL    Hematocrit 32.5 (L) 40.0 - 54.0 %    MCV 93 82 - 98 fL    MCH 29.9 27.0 - 31.0 pg    MCHC 32.3 32.0 - 36.0 g/dL    RDW 14.1 11.5 - 14.5 %    Platelets 44 (L) 150 - 350 K/uL    MPV 10.1 9.2 - 12.9 fL    Immature Granulocytes 0.5 0.0 - 0.5 %    Gran # (ANC) 2.6 1.8 - 7.7 K/uL    Immature Grans (Abs) 0.02 0.00 - 0.04 K/uL    Lymph # 1.0 1.0 - 4.8 K/uL    Mono # 0.6 0.3 - 1.0 K/uL    Eos # 0.1 0.0 - 0.5 K/uL    Baso # 0.02 0.00 - 0.20 K/uL    nRBC 0 0 /100 WBC    Gran% 59.8 38.0 - 73.0 %    Lymph% 23.5 18.0 - 48.0 %    Mono% 14.1 4.0 - 15.0 %    Eosinophil% 1.6 0.0 - 8.0 %    Basophil% 0.5 0.0 - 1.9 %    Differential Method Automated    Comprehensive metabolic panel    Collection Time: 07/31/18  8:48 AM   Result Value Ref Range    Sodium 140 136 - 145 mmol/L    Potassium 4.1 3.5 - 5.1 mmol/L    Chloride 105 95 - 110 mmol/L    CO2 24 23 - 29 mmol/L    Glucose 123 (H) 70 - 110 mg/dL    BUN, Bld 83 (H) 6 - 20 mg/dL    Creatinine 2.3 (H) 0.5 - 1.4 mg/dL    Calcium 9.7 8.7 - 10.5 mg/dL    Total Protein 7.3 6.0 - 8.4 g/dL    Albumin 3.3 (L) 3.5 - 5.2 g/dL    Total Bilirubin 0.7 0.1 - 1.0 mg/dL    Alkaline Phosphatase 168 (H) 55 - 135 U/L    AST 29 10 - 40 U/L    ALT 32 10 - 44 U/L    Anion Gap 11 8 - 16 mmol/L    eGFR if African American 42.8 (A) >60 mL/min/1.73 m^2    eGFR if non  37.0  (A) >60 mL/min/1.73 m^2   Sirolimus level    Collection Time: 07/31/18  8:48 AM   Result Value Ref Range    Sirolimus Lvl 4.5 4.0 - 20.0 ng/mL   Bilirubin, direct    Collection Time: 07/31/18  8:48 AM   Result Value Ref Range    Bilirubin, Direct 0.4 (H) 0.1 - 0.3 mg/dL   POCT BREATH ALCOHOL TEST    Collection Time: 07/31/18 11:00 AM   Result Value Ref Range    Breath Alcohol 0.000      ASSESSMENT:     Alcohol use disorder, severe, in early remission  Opioid use disorder, mild  Unspecified depression (MDD vs. Adjustment disorder)    Patient remains high risk of relapse on opiates, insight and judgement remain poor. Requires continued monitoring in IOP at this time. Currently on Suboxone for opiate agonist treatment.  Patient with possible upcoming kidney transplant which would cause risk for relapse on opiates.     PLAN:     · Continue patient on ABU protocol.  · Breathalyzer and urine toxicology daily.  · VS daily x3 days.  · Patient counseled on abstinence from alcohol, opiates (other than Suboxone), and all other drugs of abuse.    Medications: Continue current medications. The risks and benefits of medication were discussed with the patient.  · Continue Suboxone 4 mg qAM and 2 mg QHS for opiate use disorder and pain    · Remeron was discontinued by Dr. Rogers on 7/23/18    Status: Continue treatment on ABU    Patient's Intervention Response: guarded    Case discussed with Dr. Lyndsay Hall, DO  [facility]   PGY-2

## 2018-07-31 NOTE — ASSESSMENT & PLAN NOTE
Mr. Diana has improved thrombocytopenia with platelets of 60 today. I suspect his severe thrombocytopenia was related to a drug-induced TMA from his prior calcineurin inhibitors, particularly tacrolimus and cyclosporine. With the offending drugs removed, this is improved as well.    His hepatic synthetic function appears intact, so I do not think this will pose a long-term barrier to platelet recovery.    At this time, he has only moderate thrombocytopenia, so I do not recommend therapy with eltrombopag. I suggested he retain the medication in case it may be needed, but I have not recommended that he take it. We will continue to monitor his platelet counts.

## 2018-07-31 NOTE — PROGRESS NOTES
HEMATOLOGIC MALIGNANCIES PROGRESS NOTE    IDENTIFYING STATEMENT   Jhonny Monaco) is a 28 y.o. male with a  of 1990 from [place], [place] with the diagnosis of thromboctyopenia.      ONCOLOGY HISTORY:    1. 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 Anemia of chronic kidney disease    3. Alcoholic cirrhosis s/p liver transplantation on 10/19/2017  4. Substance abuse/dependence    INTERVAL HISTORY:      Mr. Diana returns to clinic for follow-up of his thrombocytopenia. He has been gradually improving since his last visit. He is participating in substance abuse group therapy sessions. He is on sirolimus, which seems to be better tolerated than tacrolimus. His renal function is stable for now.    He has only just been approved for assistance for eltrombopag, so he has not taken it yet. He is curious to learn whether or not he should take it as his platelet count has been improving.     Past Medical History, Past Social History and Past Family History have been reviewed and are unchanged except as noted in the interval history.    MEDICATIONS:     Current Outpatient Prescriptions on File Prior to Visit   Medication Sig Dispense Refill    amLODIPine (NORVASC) 5 MG tablet Take 1 tablet (5 mg total) by mouth once daily. 30 tablet 11    bisacodyl (DULCOLAX) 5 mg EC tablet Take 2 tablets (10 mg total) by mouth daily as needed for Constipation. 60 tablet 5    buprenorphine-naloxone 2-0.5 mg (SUBOXONE) 2-0.5 mg Film Take 2 film sublingually in the morning and 1 film sublingually in the evening 90 each 0    ergocalciferol (VITAMIN D2) 50,000 unit Cap take 1 capsule by mouth every 7 days 4 capsule 2    famotidine (PEPCID) 20 MG tablet Take 1 tablet (20 mg total) by mouth every evening. 30 tablet 11    folic acid (FOLVITE) 1 MG tablet Take 1 tablet (1 mg total) by mouth once daily. 30 tablet 2    levothyroxine (SYNTHROID) 75 MCG tablet Take 1 tablet (75 mcg total) by mouth before breakfast. 30 tablet  11    ondansetron (ZOFRAN-ODT) 8 MG TbDL Take 1 tablet (8 mg total) by mouth every 8 (eight) hours as needed (nausea). 30 tablet 1    predniSONE (DELTASONE) 20 MG tablet Take daily by mouth daily: 60mg 6/4-6/10, 50mg (2.5 tab) 6/11-6/17, 40mg (2 tab) 6/18-6/24, 30mg (1.5 tab) 6/25-7/1, 20 mg 7/2/18 75 tablet 11    sevelamer carbonate (RENVELA) 800 mg Tab Take 2 tablets (1,600 mg total) by mouth 3 (three) times daily with meals. 180 tablet 11    sirolimus (RAPAMUNE) 1 MG Tab Take 1 tablet (1 mg total) by mouth once daily. 30 tablet 11    sirolimus 0.5 mg Tab Take 1 tablet (0.5 mg total) by mouth once daily. Patient to take a total of 1.5mg dose daily ( One 1mg tablet, and one 0.5 tablet) 30 tablet 11    sodium bicarbonate 650 MG tablet Take 1 tablet (650 mg total) by mouth 2 (two) times daily. 60 tablet 11    torsemide (DEMADEX) 20 MG Tab Take 3 tablets (60 mg total) by mouth once daily. 90 tablet 2    ursodiol (ACTIGALL) 300 mg capsule Take 1 capsule (300 mg total) by mouth 2 (two) times daily. 60 capsule 11    [DISCONTINUED] eltrombopag (PROMACTA) 50 MG Tab Take 1 tablet (50 mg total) by mouth once daily. 30 tablet 11     No current facility-administered medications on file prior to visit.        ALLERGIES:   Review of patient's allergies indicates:   Allergen Reactions    Bactrim [sulfamethoxazole-trimethoprim] Other (See Comments)     Mookie Trell Syndrome        ROS:       Review of Systems   Constitutional: Positive for fatigue and unexpected weight change. Negative for diaphoresis and fever.   HENT:   Negative for lump/mass and sore throat.    Eyes: Negative for icterus.   Respiratory: Negative for cough and shortness of breath.    Cardiovascular: Negative for chest pain and palpitations.   Gastrointestinal: Negative for abdominal distention, constipation, diarrhea, nausea and vomiting.   Genitourinary: Negative for dysuria and frequency.    Musculoskeletal: Negative for arthralgias, gait problem  "and myalgias.   Skin: Negative for rash.   Neurological: Negative for dizziness, gait problem and headaches.   Hematological: Negative for adenopathy. Does not bruise/bleed easily.   Psychiatric/Behavioral: Positive for depression. The patient is nervous/anxious.        PHYSICAL EXAM:  Vitals:    07/27/18 1123   BP: 120/70   Pulse: 97   Temp: 98.6 °F (37 °C)   TempSrc: Oral   SpO2: 97%   Weight: 72.3 kg (159 lb 6.3 oz)   Height: 5' 6" (1.676 m)   PainSc: 0-No pain       KARNOFSKY PERFORMANCE STATUS 70%  ECOG 2    Physical Exam   Constitutional: He is oriented to person, place, and time. He appears well-developed and well-nourished. No distress.   HENT:   Head: Normocephalic and atraumatic.   Mouth/Throat: Mucous membranes are normal. No oral lesions.   Eyes: Conjunctivae are normal.   Neck: No thyromegaly present.   Cardiovascular: Normal rate, regular rhythm and normal heart sounds.    No murmur heard.  Pulmonary/Chest: Breath sounds normal. He has no wheezes. He has no rales.   Catheter for dialysis is in the chest.   Abdominal: Soft. He exhibits distension. He exhibits no mass. There is no splenomegaly or hepatomegaly. There is no tenderness.   Lymphadenopathy:     He has no cervical adenopathy.        Right cervical: No deep cervical adenopathy present.       Left cervical: No deep cervical adenopathy present.     He has no axillary adenopathy.        Right: No inguinal adenopathy present.        Left: No inguinal adenopathy present.   Neurological: He is alert and oriented to person, place, and time. He has normal strength and normal reflexes. No cranial nerve deficit. Coordination normal.   Skin: No rash noted.   Scattered purpura       LAB:   Results for orders placed or performed in visit on 07/27/18   AFP tumor marker   Result Value Ref Range    AFP 7.3 0.0 - 8.4 ng/mL   Protime-INR   Result Value Ref Range    Prothrombin Time 9.9 9.0 - 12.5 sec    INR 0.9 0.8 - 1.2   Sirolimus level   Result Value Ref " Range    Sirolimus Lvl 3.3 (L) 4.0 - 20.0 ng/mL   Bilirubin, direct   Result Value Ref Range    Bilirubin, Direct 0.3 0.1 - 0.3 mg/dL   Rapid BMT CBC with Diff   Result Value Ref Range    WBC 6.13 3.90 - 12.70 K/uL    RBC 3.42 (L) 4.60 - 6.20 M/uL    Hemoglobin 10.8 (L) 14.0 - 18.0 g/dL    Hematocrit 31.9 (L) 40.0 - 54.0 %    MCV 93 82 - 98 fL    MCH 31.6 (H) 27.0 - 31.0 pg    MCHC 33.9 32.0 - 36.0 g/dL    RDW 14.2 11.5 - 14.5 %    Platelets 60 (L) 150 - 350 K/uL    MPV 10.5 9.2 - 12.9 fL    Immature Granulocytes 0.5 0.0 - 0.5 %    Gran # (ANC) 4.0 1.8 - 7.7 K/uL    Immature Grans (Abs) 0.03 0.00 - 0.04 K/uL    Lymph # 1.2 1.0 - 4.8 K/uL    Mono # 0.8 0.3 - 1.0 K/uL    Eos # 0.1 0.0 - 0.5 K/uL    Baso # 0.02 0.00 - 0.20 K/uL    nRBC 0 0 /100 WBC    Gran% 65.5 38.0 - 73.0 %    Lymph% 20.2 18.0 - 48.0 %    Mono% 12.2 4.0 - 15.0 %    Eosinophil% 1.3 0.0 - 8.0 %    Basophil% 0.3 0.0 - 1.9 %    Differential Method Automated    Comprehensive metabolic panel   Result Value Ref Range    Sodium 140 136 - 145 mmol/L    Potassium 4.0 3.5 - 5.1 mmol/L    Chloride 106 95 - 110 mmol/L    CO2 20 (L) 23 - 29 mmol/L    Glucose 118 (H) 70 - 110 mg/dL    BUN, Bld 80 (H) 6 - 20 mg/dL    Creatinine 2.6 (H) 0.5 - 1.4 mg/dL    Calcium 9.7 8.7 - 10.5 mg/dL    Total Protein 7.4 6.0 - 8.4 g/dL    Albumin 3.5 3.5 - 5.2 g/dL    Total Bilirubin 0.6 0.1 - 1.0 mg/dL    Alkaline Phosphatase 165 (H) 55 - 135 U/L    AST 26 10 - 40 U/L    ALT 30 10 - 44 U/L    Anion Gap 14 8 - 16 mmol/L    eGFR if African American 36.9 (A) >60 mL/min/1.73 m^2    eGFR if non  31.9 (A) >60 mL/min/1.73 m^2       PROBLEMS ASSESSED THIS VISIT:    1. Thrombocytopenia    2. Anemia of chronic renal failure, stage 5    3. Long-term use of immunosuppressant medication        PLAN:       Thrombocytopenia  Mr. Diana has improved thrombocytopenia with platelets of 60 today. I suspect his severe thrombocytopenia was related to a drug-induced TMA from his prior  calcineurin inhibitors, particularly tacrolimus and cyclosporine. With the offending drugs removed, this is improved as well.    His hepatic synthetic function appears intact, so I do not think this will pose a long-term barrier to platelet recovery.    At this time, he has only moderate thrombocytopenia, so I do not recommend therapy with eltrombopag. I suggested he retain the medication in case it may be needed, but I have not recommended that he take it. We will continue to monitor his platelet counts.     Anemia of chronic renal failure  Stable. No indication for transfusion. We will monitor.     Follow-up in 2 months    Seyd Maurice MD  Hematology and Stem Cell Transplant

## 2018-07-31 NOTE — PROGRESS NOTES
Group Psychotherapy (PhD/LCSW)    Site: [facility]    Clinical status of patient: Intensive Outpatient Program (IOP)    Date: 7/31/2018    Group Focus: Disease Model of Addiction      Length of service: 02434 - 45-50 minutes    Number of patients in attendance: 6    Referred by: Addictive Behavior Unit Treatment Team    Target symptoms: Alcohol Abuse    Patient's response to treatment: Active Listening and Self-disclosure    Progress toward goals: Progressing adequately    Interval History: Discussed the basic neuropsychological concepts of the Disease Model of Addiction and their relationship to the phenomena of addiction (powerlessness, euphoric recall, cravings, relapse, etc).  Noted the value of understanding the Disease Model for sustaining long-term sobriety. Noted the way the Disease Model comports with 12-step philosophy and practices.        Diagnosis: alcohol use disorder, severe, in early remission    Plan: Continue treatment on ABU

## 2018-08-01 ENCOUNTER — TELEPHONE (OUTPATIENT)
Dept: TRANSPLANT | Facility: CLINIC | Age: 28
End: 2018-08-01

## 2018-08-01 ENCOUNTER — TELEPHONE (OUTPATIENT)
Dept: HEMATOLOGY/ONCOLOGY | Facility: CLINIC | Age: 28
End: 2018-08-01

## 2018-08-01 ENCOUNTER — HOSPITAL ENCOUNTER (OUTPATIENT)
Dept: PSYCHIATRY | Facility: HOSPITAL | Age: 28
Discharge: HOME | End: 2018-08-01
Attending: PSYCHIATRY & NEUROLOGY
Payer: COMMERCIAL

## 2018-08-01 ENCOUNTER — OFFICE VISIT (OUTPATIENT)
Dept: INTERNAL MEDICINE | Facility: CLINIC | Age: 28
End: 2018-08-01
Payer: COMMERCIAL

## 2018-08-01 VITALS
DIASTOLIC BLOOD PRESSURE: 64 MMHG | SYSTOLIC BLOOD PRESSURE: 126 MMHG | TEMPERATURE: 99 F | HEART RATE: 97 BPM | BODY MASS INDEX: 24.45 KG/M2 | OXYGEN SATURATION: 99 % | WEIGHT: 151.44 LBS

## 2018-08-01 VITALS — SYSTOLIC BLOOD PRESSURE: 135 MMHG | HEART RATE: 96 BPM | DIASTOLIC BLOOD PRESSURE: 73 MMHG | RESPIRATION RATE: 16 BRPM

## 2018-08-01 DIAGNOSIS — M25.562 PAIN AND SWELLING OF LEFT KNEE: Primary | ICD-10-CM

## 2018-08-01 DIAGNOSIS — R45.89 ANXIETY ABOUT HEALTH: ICD-10-CM

## 2018-08-01 DIAGNOSIS — M25.462 PAIN AND SWELLING OF LEFT KNEE: Primary | ICD-10-CM

## 2018-08-01 DIAGNOSIS — F10.21 ALCOHOL USE DISORDER, SEVERE, IN EARLY REMISSION: Primary | ICD-10-CM

## 2018-08-01 LAB
AMPHET+METHAMPHET UR QL: NEGATIVE
BARBITURATES UR QL SCN>200 NG/ML: NEGATIVE
BENZODIAZ UR QL SCN>200 NG/ML: NEGATIVE
BREATH ALCOHOL: 0
BZE UR QL SCN: NEGATIVE
CANNABINOIDS UR QL SCN: NEGATIVE
CREAT UR-MCNC: 34 MG/DL
ETHANOL UR-MCNC: <10 MG/DL
METHADONE UR QL SCN>300 NG/ML: NEGATIVE
OPIATES UR QL SCN: NEGATIVE
PCP UR QL SCN>25 NG/ML: NEGATIVE
TOXICOLOGY INFORMATION: NORMAL

## 2018-08-01 PROCEDURE — 99999 PR PBB SHADOW E&M-EST. PATIENT-LVL III: CPT | Mod: PBBFAC,,, | Performed by: INTERNAL MEDICINE

## 2018-08-01 PROCEDURE — 99232 SBSQ HOSP IP/OBS MODERATE 35: CPT | Mod: ,,, | Performed by: PSYCHIATRY & NEUROLOGY

## 2018-08-01 PROCEDURE — 90853 GROUP PSYCHOTHERAPY: CPT | Mod: ,,, | Performed by: PSYCHIATRY & NEUROLOGY

## 2018-08-01 PROCEDURE — 90853 GROUP PSYCHOTHERAPY: CPT | Mod: ,,, | Performed by: PSYCHOLOGIST

## 2018-08-01 PROCEDURE — 90853 GROUP PSYCHOTHERAPY: CPT

## 2018-08-01 PROCEDURE — 99213 OFFICE O/P EST LOW 20 MIN: CPT | Mod: S$GLB,,, | Performed by: INTERNAL MEDICINE

## 2018-08-01 PROCEDURE — 80307 DRUG TEST PRSMV CHEM ANLYZR: CPT | Mod: NTX

## 2018-08-01 PROCEDURE — 90853 GROUP PSYCHOTHERAPY: CPT | Performed by: SOCIAL WORKER

## 2018-08-01 NOTE — TELEPHONE ENCOUNTER
----- Message from Savanna Hernandez sent at 8/1/2018  3:35 PM CDT -----  Contact: Patient Mother   Needs Advice    Reason for call:    Patient is have trouble with his left knee.   Communication Preference: [phone] apt  Additional Information: n/a

## 2018-08-01 NOTE — PROGRESS NOTES
2018 5:32 PM  Name: Jhonny Diana  : 1990  Start Date: 18    [facility] Intensive Outpatient Program   Progress Note    Status: Intensive Outpatient Program (IOP)    HPI:  Jhonny Diana is a 27 y.o. male who presents for admission to ABU with principal problem of opioid use, as well as hx of alcohol use disorder.      Pt is s/p liver transplant in 10/2017 due to acute alcoholic hepatitis. He has a hx of heavy alcohol use since his teens. He required emergent liver transplantation prior to being able to undergo any type of substance abuse treatment. It was recommended that pt attend the ABU program after he received his liver transplant as he was deemed to be very high risk for relapse post-transplant. However pt never followed up in the ABU due to a number of post-op medical complications including ESRD requiring HD (Mon/Fri). In 2018 the addiction psychiatry team was consulted while pt was admitted to the hospital due to concerns for opioid addiction. Pt was started on Suboxone and again recommended to f/u with the ABU for substance abuse treatment.      Today pt reports that he is feeling well physically. He confirms hx of alcohol use since the age of 18. He escalated to drinking a fifth of liquor daily in his early to mid twenties. He attempted to cut back a number of times but was unsuccessful. He has attended AA meetings in the past but has never been to a formal rehab program. He minimizes the effects that alcohol had in his life as he states that he mostly drank by himself, so it did not affect his relationships. He does admit that alcohol was a problem for him however, and recognizes the need for continued life long sobriety following his liver transplant.      Regarding his opiate use, pt reports using 10 mg of oxycontin every 4 hours as was prescribed to him. He reports that he began getting prescriptions for opiates a few months prior to his transplant, and continued to use them until this  "month when he was transitioned to Suboxone. He denies ever buying opiates or using meds which were not prescribed to him. He denies ever injecting or snorting them. He does report feeling better both physically and mentally on Suboxone. He reports feeling like his head is clearer and pain is better controlled. He denies any current side effects from Suboxone or cravings to use other opiates.      Last drink/use: Last use of alcohol 9/2017. Last use of narcotic pain medications (other than Suboxone) 6/14/18  Average consumption: drank a fifth of liquor daily. Used Oxycontin 10 mg PO q4h  Withdrawal: yes, hx of tremors and anxiety from alcohol withdrawal. No hx of complicated alcohol withdrawal or opiate withdrawal  Spouse/partner consumption: n/a  Rehab/Detox: no  AA/NA: yes, has been to AA before. Went for approx 1 year prior to his transplant. States "not a fan" due to Taoism aspect    SUBJECTIVE:     Interval Hx:   Upon my evaluation, patient is linear, organized, alert and oriented x 4 with full range of affect. Pt denies any physical symptoms.    Patient is still awaiting to see if he will need a kidney transplant, has an appointment August 14th.   Reports normal sleep and appetite s/p discontinuation of mirtazapine. Pt describes his mood as "good."  Continues to be guarded and gives superficial answers.  Pt is willing to decrease his suboxone, but is concerned his abdominal pain may return.    Medication Side Effects: None  Cravings: Denied cravings for alcohol,   No other acute psychiatric issues reported at this time.    Scheduled Meds:   Current Outpatient Prescriptions on File Prior to Encounter   Medication Sig Dispense Refill    amLODIPine (NORVASC) 5 MG tablet Take 1 tablet (5 mg total) by mouth once daily. 30 tablet 11    bisacodyl (DULCOLAX) 5 mg EC tablet Take 2 tablets (10 mg total) by mouth daily as needed for Constipation. 60 tablet 5    buprenorphine-naloxone 2-0.5 mg (SUBOXONE) 2-0.5 mg " "Film Take 2 film sublingually in the morning and 1 film sublingually in the evening 90 each 0    doxycycline (VIBRAMYCIN) 100 MG Cap Take 1 capsule (100 mg total) by mouth every 12 (twelve) hours. For 7 days 14 capsule 0    ergocalciferol (VITAMIN D2) 50,000 unit Cap take 1 capsule by mouth every 7 days 4 capsule 2    famotidine (PEPCID) 20 MG tablet Take 1 tablet (20 mg total) by mouth every evening. 30 tablet 11    folic acid (FOLVITE) 1 MG tablet Take 1 tablet (1 mg total) by mouth once daily. 30 tablet 2    levothyroxine (SYNTHROID) 75 MCG tablet Take 1 tablet (75 mcg total) by mouth before breakfast. 30 tablet 11    ondansetron (ZOFRAN-ODT) 8 MG TbDL Take 1 tablet (8 mg total) by mouth every 8 (eight) hours as needed (nausea). 30 tablet 1    sevelamer carbonate (RENVELA) 800 mg Tab Take 2 tablets (1,600 mg total) by mouth 3 (three) times daily with meals. 180 tablet 11    sirolimus (RAPAMUNE) 1 MG Tab Take 1 tablet (1 mg total) by mouth once daily. 30 tablet 11    sirolimus 0.5 mg Tab Take 1 tablet (0.5 mg total) by mouth once daily. Patient to take a total of 1.5mg dose daily ( One 1mg tablet, and one 0.5 tablet) 30 tablet 11    sodium bicarbonate 650 MG tablet Take 1 tablet (650 mg total) by mouth 2 (two) times daily. 60 tablet 11    torsemide (DEMADEX) 20 MG Tab Take 3 tablets (60 mg total) by mouth once daily. 90 tablet 2    ursodiol (ACTIGALL) 300 mg capsule Take 1 capsule (300 mg total) by mouth 2 (two) times daily. 60 capsule 11     No current facility-administered medications on file prior to encounter.      Allergies:  Bactrim [sulfamethoxazole-trimethoprim]    Medical ROS:  See H&P dated 6/29/18 for ROS.     OBJECTIVE:     Vital Signs (Most Recent):  There were no vitals filed for this visit.    Mental Status Exam:  Appearance: casually dressed,    Behavior/Cooperation: guarded, eye contact normal  Speech: normal tone, normal rate, normal pitch, normal volume  Mood "good"  Affect: Mood " congruent, Appropriate  Thought Process: goal-directed  Thought Content: normal, no suicidality, no homicidality, delusions, or paranoia  Orientation: grossly intact  Memory: Grossly intact  Attention Span/Concentration: Normal  Cognition: grossly intact  Insight: limited  Judgment: limited    Laboratory:  Recent Results (from the past 168 hour(s))   POCT BREATH ALCOHOL TEST    Collection Time: 07/26/18 11:32 AM   Result Value Ref Range    Breath Alcohol 0.000    AFP tumor marker    Collection Time: 07/27/18 10:37 AM   Result Value Ref Range    AFP 7.3 0.0 - 8.4 ng/mL   Protime-INR    Collection Time: 07/27/18 10:37 AM   Result Value Ref Range    Prothrombin Time 9.9 9.0 - 12.5 sec    INR 0.9 0.8 - 1.2   Sirolimus level    Collection Time: 07/27/18 10:37 AM   Result Value Ref Range    Sirolimus Lvl 3.3 (L) 4.0 - 20.0 ng/mL   Bilirubin, direct    Collection Time: 07/27/18 10:37 AM   Result Value Ref Range    Bilirubin, Direct 0.3 0.1 - 0.3 mg/dL   Rapid BMT CBC with Diff    Collection Time: 07/27/18 10:37 AM   Result Value Ref Range    WBC 6.13 3.90 - 12.70 K/uL    RBC 3.42 (L) 4.60 - 6.20 M/uL    Hemoglobin 10.8 (L) 14.0 - 18.0 g/dL    Hematocrit 31.9 (L) 40.0 - 54.0 %    MCV 93 82 - 98 fL    MCH 31.6 (H) 27.0 - 31.0 pg    MCHC 33.9 32.0 - 36.0 g/dL    RDW 14.2 11.5 - 14.5 %    Platelets 60 (L) 150 - 350 K/uL    MPV 10.5 9.2 - 12.9 fL    Immature Granulocytes 0.5 0.0 - 0.5 %    Gran # (ANC) 4.0 1.8 - 7.7 K/uL    Immature Grans (Abs) 0.03 0.00 - 0.04 K/uL    Lymph # 1.2 1.0 - 4.8 K/uL    Mono # 0.8 0.3 - 1.0 K/uL    Eos # 0.1 0.0 - 0.5 K/uL    Baso # 0.02 0.00 - 0.20 K/uL    nRBC 0 0 /100 WBC    Gran% 65.5 38.0 - 73.0 %    Lymph% 20.2 18.0 - 48.0 %    Mono% 12.2 4.0 - 15.0 %    Eosinophil% 1.3 0.0 - 8.0 %    Basophil% 0.3 0.0 - 1.9 %    Differential Method Automated    Comprehensive metabolic panel    Collection Time: 07/27/18 10:37 AM   Result Value Ref Range    Sodium 140 136 - 145 mmol/L    Potassium 4.0 3.5 - 5.1  mmol/L    Chloride 106 95 - 110 mmol/L    CO2 20 (L) 23 - 29 mmol/L    Glucose 118 (H) 70 - 110 mg/dL    BUN, Bld 80 (H) 6 - 20 mg/dL    Creatinine 2.6 (H) 0.5 - 1.4 mg/dL    Calcium 9.7 8.7 - 10.5 mg/dL    Total Protein 7.4 6.0 - 8.4 g/dL    Albumin 3.5 3.5 - 5.2 g/dL    Total Bilirubin 0.6 0.1 - 1.0 mg/dL    Alkaline Phosphatase 165 (H) 55 - 135 U/L    AST 26 10 - 40 U/L    ALT 30 10 - 44 U/L    Anion Gap 14 8 - 16 mmol/L    eGFR if African American 36.9 (A) >60 mL/min/1.73 m^2    eGFR if non  31.9 (A) >60 mL/min/1.73 m^2   Toxicology screen, urine    Collection Time: 07/27/18 11:24 AM   Result Value Ref Range    Alcohol, Urine <10 <10 mg/dL    Benzodiazepines Negative     Methadone metabolites Negative     Cocaine (Metab.) Negative     Opiate Scrn, Ur Negative     Barbiturate Screen, Ur Negative     Amphetamine Screen, Ur Negative     THC Negative     Phencyclidine Negative     Creatinine, Random Ur 51.0 23.0 - 375.0 mg/dL    Toxicology Information SEE COMMENT    POCT BREATH ALCOHOL TEST    Collection Time: 07/27/18 11:25 AM   Result Value Ref Range    Breath Alcohol 0.000    Toxicology screen, urine    Collection Time: 07/30/18 10:51 AM   Result Value Ref Range    Alcohol, Urine <10 <10 mg/dL    Benzodiazepines Negative     Methadone metabolites Negative     Cocaine (Metab.) Negative     Opiate Scrn, Ur Negative     Barbiturate Screen, Ur Negative     Amphetamine Screen, Ur Negative     THC Negative     Phencyclidine Negative     Creatinine, Random Ur 37.0 23.0 - 375.0 mg/dL    Toxicology Information SEE COMMENT    POCT BREATH ALCOHOL TEST    Collection Time: 07/30/18 10:51 AM   Result Value Ref Range    Breath Alcohol 0.000    CBC auto differential    Collection Time: 07/31/18  8:48 AM   Result Value Ref Range    WBC 4.26 3.90 - 12.70 K/uL    RBC 3.51 (L) 4.60 - 6.20 M/uL    Hemoglobin 10.5 (L) 14.0 - 18.0 g/dL    Hematocrit 32.5 (L) 40.0 - 54.0 %    MCV 93 82 - 98 fL    MCH 29.9 27.0 - 31.0 pg     MCHC 32.3 32.0 - 36.0 g/dL    RDW 14.1 11.5 - 14.5 %    Platelets 44 (L) 150 - 350 K/uL    MPV 10.1 9.2 - 12.9 fL    Immature Granulocytes 0.5 0.0 - 0.5 %    Gran # (ANC) 2.6 1.8 - 7.7 K/uL    Immature Grans (Abs) 0.02 0.00 - 0.04 K/uL    Lymph # 1.0 1.0 - 4.8 K/uL    Mono # 0.6 0.3 - 1.0 K/uL    Eos # 0.1 0.0 - 0.5 K/uL    Baso # 0.02 0.00 - 0.20 K/uL    nRBC 0 0 /100 WBC    Gran% 59.8 38.0 - 73.0 %    Lymph% 23.5 18.0 - 48.0 %    Mono% 14.1 4.0 - 15.0 %    Eosinophil% 1.6 0.0 - 8.0 %    Basophil% 0.5 0.0 - 1.9 %    Differential Method Automated    Comprehensive metabolic panel    Collection Time: 07/31/18  8:48 AM   Result Value Ref Range    Sodium 140 136 - 145 mmol/L    Potassium 4.1 3.5 - 5.1 mmol/L    Chloride 105 95 - 110 mmol/L    CO2 24 23 - 29 mmol/L    Glucose 123 (H) 70 - 110 mg/dL    BUN, Bld 83 (H) 6 - 20 mg/dL    Creatinine 2.3 (H) 0.5 - 1.4 mg/dL    Calcium 9.7 8.7 - 10.5 mg/dL    Total Protein 7.3 6.0 - 8.4 g/dL    Albumin 3.3 (L) 3.5 - 5.2 g/dL    Total Bilirubin 0.7 0.1 - 1.0 mg/dL    Alkaline Phosphatase 168 (H) 55 - 135 U/L    AST 29 10 - 40 U/L    ALT 32 10 - 44 U/L    Anion Gap 11 8 - 16 mmol/L    eGFR if African American 42.8 (A) >60 mL/min/1.73 m^2    eGFR if non  37.0 (A) >60 mL/min/1.73 m^2   Sirolimus level    Collection Time: 07/31/18  8:48 AM   Result Value Ref Range    Sirolimus Lvl 4.5 4.0 - 20.0 ng/mL   Bilirubin, direct    Collection Time: 07/31/18  8:48 AM   Result Value Ref Range    Bilirubin, Direct 0.4 (H) 0.1 - 0.3 mg/dL   POCT BREATH ALCOHOL TEST    Collection Time: 07/31/18 11:00 AM   Result Value Ref Range    Breath Alcohol 0.000      ASSESSMENT:     Alcohol use disorder, severe, in early remission  Opioid use disorder, mild  Unspecified depression (MDD vs. Adjustment disorder)    Patient remains at risk for relapse on opiates, insight and judgement remain limited. Requires continued monitoring in IOP at this time. Currently on Suboxone for opiate agonist  treatment, beginning to taper down before discharge.  Pt may want to continue suboxone outpatient.    PLAN:     · Continue patient on ABU protocol.  · Breathalyzer and urine toxicology daily.  · VS daily x3 days.  · Patient counseled on abstinence from alcohol, opiates (other than Suboxone), and all other drugs of abuse.    Medications: Continue current medications. The risks and benefits of medication were discussed with the patient.  · Decrease Suboxone to 2 mg qAM and 2 mg QHS for opiate use disorder and pain.    · Remeron was discontinued by Dr. Rogers on 7/23/18.    Status: Continue treatment on ABU    Patient's Intervention Response: guarded    Case discussed with Dr. Lyndsay Stratton MD  [facility] Psychiatry [facility]II  8/1/2018  10:53 AM    Attending Attestation:    I have independently evaluated the patient and discussed the case with the resident. I have reviewed this note, edited it where appropriate, and agree with its current contents, including the assessment and plan.     Blank Umana MD

## 2018-08-01 NOTE — PATIENT CARE CONFERENCE
[facility]U Staffing:      Alcohol use disorder, severe, in early remission  Opioid use disorder, mild  Unspecified depression (MDD vs. Adjustment disorder)        1. Pt is attending all groups    2. Pt is attending all meetings  3. Pt 's has minimally supportive family  4. Pt has completed spiritual assessment    5. Pt will present life story    6. Pt will present Step One assignment    7. Pt is exploring issues related to relapse  prevention; spirituality; stress management; improved communication skills; assertiveness training; poor self-esteem; disease concepts; cross addictions; and, work related issues    8. D/C date: 8/14     Staff discussed pt's compliance within the program and consistently attending AA meeting. Staff discussed pt's consistent and improved communication with staff members. Staff discussed concern with pt adherence to recovery related to minimal processing of recovery. Staff discussed pt not identifying with recovery. Staff discussed pt denial of opiate use hx. Staff discussed plan to taper suboxone and monitoring. Staff discussed concern with pt possible continued tobacco use. Staff discussed tentative D/C plan and aftercare.     Problem: Alcohol use disorder, severe, early remission  Goal: Address in 12 step meetings and group and individual sessions    Objective Measure: participation in groups, self report, length of sobriety, and relapse prevention plan  Time: Prior to discharge    Progress: Pt is attending groups and sessions     Problem: Opioid use disorder, mild  Goal: Address in 12 step meetings and group and individual sessions    Objective Measure: participation in groups, self report, length of sobriety, and relapse prevention plan  Time: Prior to discharge    Progress: Pt is attending groups and sessions    Problem: Unspecified depression (MDD vs. Adjustment disorder)  Goal: Address in 12 step meetings and group and individual sessions    Objective Measure: participation in groups, self  report, length of sobriety, and relapse prevention plan  Time: Prior to discharge    Progress: Pt is attending groups and sessions       Staff members present:     Dr. Deleon, PhD  Dr. Ivis MD Resident  Dr. Hall,  Resident  Nino Villarreal, [facility]  Cassy Pollock, [facility]  Vasquez Guillen, [facility]  Lorie Garcia RN

## 2018-08-01 NOTE — PROGRESS NOTES
Group Psychotherapy (PhD/LCSW)    Site: [facility]    Clinical status of patient: Intensive Outpatient Program (IOP)    Date: 8/1/2018    Group Focus: Psychodynamic Group Psychotherapy    Length of service: 25853 - 45-50 minutes    Number of patients in attendance: 5    Referred by: Addictive Behavior Unit Treatment Team    Target symptoms: Alcohol Abuse    Patient's response to treatment: Active Listening and Self-disclosure    Progress toward goals: Progressing adequately    Interval History: Discussed planning to go back home to [place].    Diagnosis: alcohol use disorder, severe, in early remission    Plan: Continue treatment on ABU

## 2018-08-01 NOTE — PROGRESS NOTES
Group Psychotherapy (PhD/LCSW)    Site: [facility]    Clinical status of patient: Intensive Outpatient Program (IOP)    Date: 8/1/2018    Group Focus: ACT Group Psychotherapy    Length of service: 10778 - 45-50 minutes    Number of patients in attendance: 11    Referred by: Addictive Behavior Unit Treatment Team    Target symptoms: Alcohol Abuse    Patient's response to treatment: Active Listening and Self-disclosure    Progress toward goals: Progressing adequately    Interval History: Session focus was Fusion and Defusion.  Patients learned about penitentiary words and penitentiary conversations.  They were instructed to write and re-write their own penitentiary conversations.    Diagnosis: alcohol use disorder, severe, in early remission    Plan: Continue treatment on ABU

## 2018-08-01 NOTE — PROGRESS NOTES
Group Psychotherapy (MD)     Site: [facility]     Clinical status of patient: Intensive Outpatient Program (IOP)     Date: 8/1/18     Group Focus: Medical and Neuropsychiatric Bases of Psychiatric Disease and Addiction: Self medication     Length of service: 86771 - 45-50 minutes     Number of patients in attendance: 11     Referred by: Addictive Behavioral Unit Treatment Team     Target symptoms: Anxiety, depression, cravings and other negative symptoms     Patient's response to treatment: Active Listening       Progress toward goals: progressing adequately    content of Therapy: Provided education about addictive circuits in the brain and relationship with other mental health issues. Explained concept of self medication and contrasted it with self care, discussed hazards of short acting quick fixes for psychiatric issues and benefits of long term sustainable solutions. Prompted examples of addictive, poor coping skills and brainstormed alternative more helpful behaviors, such as mindfulness, therapy, exercise.           Diagnosis: Alcohol use disorder, severe     Plan: Continue treatment on ABU

## 2018-08-01 NOTE — TELEPHONE ENCOUNTER
Attempted to call patient.  Voicemail left.        ----- Message from Syed Maurice MD sent at 7/31/2018  5:11 PM CDT -----  Contact: Elvira  I don't think there is urgency to take eltrombopag (Promacta). His platelets were 60 at last visit. I would continue monitoring as long as he is not bleeding. He should only take it if he is bleeding or needs a procedure and platelets are less than 50.   ----- Message -----  From: Seda Miller RN  Sent: 7/31/2018   4:26 PM  To: Syed Maurice MD    Please let me know what you would like me to instruct the patient regarding this message.  Thank you    Seda    ----- Message -----  From: Jevon Maurice  Sent: 7/31/2018   3:31 PM  To: Josafat Lynch Staff    Will like a call from office in regards to pt blood levels dropping 60 to 44. Will like promacta  prescribed to pt     Contact::[phone]

## 2018-08-01 NOTE — PLAN OF CARE
08/01/18 1400   Activity/Group Therapy Checklist   Group Relapse Prevention   Attendance Attended   Follows Direction Followed directions   Group Interactions/Observations Interacted appropriately   Affect/Mood Range Normal range   Affect/Mood Display Appropriate   Goal Progression Progressing

## 2018-08-02 ENCOUNTER — TELEPHONE (OUTPATIENT)
Dept: TRANSPLANT | Facility: CLINIC | Age: 28
End: 2018-08-02

## 2018-08-02 ENCOUNTER — HOSPITAL ENCOUNTER (OUTPATIENT)
Dept: PSYCHIATRY | Facility: HOSPITAL | Age: 28
Discharge: HOME | End: 2018-08-02
Attending: PSYCHIATRY & NEUROLOGY
Payer: COMMERCIAL

## 2018-08-02 ENCOUNTER — HOSPITAL ENCOUNTER (INPATIENT)
Facility: HOSPITAL | Age: 28
LOS: 1 days | Discharge: LEFT BEFORE DISCHARGE | DRG: 556 | End: 2018-08-03
Attending: TRANSPLANT SURGERY | Admitting: TRANSPLANT SURGERY
Payer: COMMERCIAL

## 2018-08-02 ENCOUNTER — PATIENT MESSAGE (OUTPATIENT)
Dept: TRANSPLANT | Facility: CLINIC | Age: 28
End: 2018-08-02

## 2018-08-02 VITALS
DIASTOLIC BLOOD PRESSURE: 65 MMHG | RESPIRATION RATE: 18 BRPM | HEART RATE: 104 BPM | OXYGEN SATURATION: 100 % | TEMPERATURE: 99 F | SYSTOLIC BLOOD PRESSURE: 125 MMHG

## 2018-08-02 DIAGNOSIS — F10.21 ALCOHOL USE DISORDER, SEVERE, IN EARLY REMISSION: ICD-10-CM

## 2018-08-02 DIAGNOSIS — R10.9 ABDOMINAL PAIN: ICD-10-CM

## 2018-08-02 DIAGNOSIS — R45.89 ANXIETY ABOUT HEALTH: ICD-10-CM

## 2018-08-02 PROBLEM — M25.562 LEFT KNEE PAIN: Status: ACTIVE | Noted: 2018-08-02

## 2018-08-02 LAB — BREATH ALCOHOL: 0

## 2018-08-02 PROCEDURE — 90853 GROUP PSYCHOTHERAPY: CPT

## 2018-08-02 PROCEDURE — 87075 CULTR BACTERIA EXCEPT BLOOD: CPT | Mod: NTX

## 2018-08-02 PROCEDURE — 87206 SMEAR FLUORESCENT/ACID STAI: CPT | Mod: NTX

## 2018-08-02 PROCEDURE — 89060 EXAM SYNOVIAL FLUID CRYSTALS: CPT | Mod: NTX

## 2018-08-02 PROCEDURE — 87205 SMEAR GRAM STAIN: CPT | Mod: NTX

## 2018-08-02 PROCEDURE — 87116 MYCOBACTERIA CULTURE: CPT | Mod: NTX

## 2018-08-02 PROCEDURE — 0S9D3ZX DRAINAGE OF LEFT KNEE JOINT, PERCUTANEOUS APPROACH, DIAGNOSTIC: ICD-10-PCS | Performed by: ORTHOPAEDIC SURGERY

## 2018-08-02 PROCEDURE — 87102 FUNGUS ISOLATION CULTURE: CPT | Mod: NTX

## 2018-08-02 PROCEDURE — 87070 CULTURE OTHR SPECIMN AEROBIC: CPT | Mod: NTX

## 2018-08-02 PROCEDURE — 90853 GROUP PSYCHOTHERAPY: CPT | Mod: ,,, | Performed by: PSYCHOLOGIST

## 2018-08-02 PROCEDURE — 90853 GROUP PSYCHOTHERAPY: CPT | Mod: NTX | Performed by: SOCIAL WORKER

## 2018-08-02 PROCEDURE — G0177 OPPS/PHP; TRAIN & EDUC SERV: HCPCS | Mod: NTX

## 2018-08-02 PROCEDURE — 90853 GROUP PSYCHOTHERAPY: CPT | Mod: NTX

## 2018-08-02 PROCEDURE — 20600001 HC STEP DOWN PRIVATE ROOM: Mod: NTX

## 2018-08-02 RX ORDER — BISACODYL 5 MG
10 TABLET, DELAYED RELEASE (ENTERIC COATED) ORAL DAILY PRN
Status: DISCONTINUED | OUTPATIENT
Start: 2018-08-02 | End: 2018-08-03 | Stop reason: HOSPADM

## 2018-08-02 RX ORDER — DEXTROSE MONOHYDRATE, SODIUM CHLORIDE, AND POTASSIUM CHLORIDE 50; 1.49; 9 G/1000ML; G/1000ML; G/1000ML
INJECTION, SOLUTION INTRAVENOUS CONTINUOUS
Status: DISCONTINUED | OUTPATIENT
Start: 2018-08-02 | End: 2018-08-02

## 2018-08-02 RX ORDER — LEVOTHYROXINE SODIUM 75 UG/1
75 TABLET ORAL
Status: DISCONTINUED | OUTPATIENT
Start: 2018-08-03 | End: 2018-08-03 | Stop reason: HOSPADM

## 2018-08-02 RX ORDER — SEVELAMER CARBONATE 800 MG/1
1600 TABLET, FILM COATED ORAL
Status: DISCONTINUED | OUTPATIENT
Start: 2018-08-03 | End: 2018-08-03 | Stop reason: HOSPADM

## 2018-08-02 RX ORDER — SIROLIMUS 1 MG/1
1 TABLET, FILM COATED ORAL DAILY
Status: DISCONTINUED | OUTPATIENT
Start: 2018-08-03 | End: 2018-08-03 | Stop reason: HOSPADM

## 2018-08-02 RX ORDER — FAMOTIDINE 20 MG/1
20 TABLET, FILM COATED ORAL NIGHTLY
Status: DISCONTINUED | OUTPATIENT
Start: 2018-08-03 | End: 2018-08-03 | Stop reason: HOSPADM

## 2018-08-02 RX ORDER — ACETAMINOPHEN 325 MG/1
650 TABLET ORAL EVERY 6 HOURS PRN
Status: DISCONTINUED | OUTPATIENT
Start: 2018-08-02 | End: 2018-08-03 | Stop reason: HOSPADM

## 2018-08-02 RX ORDER — DOXYCYCLINE HYCLATE 100 MG
100 TABLET ORAL EVERY 12 HOURS
Status: DISCONTINUED | OUTPATIENT
Start: 2018-08-03 | End: 2018-08-03 | Stop reason: HOSPADM

## 2018-08-02 RX ORDER — AMLODIPINE BESYLATE 5 MG/1
5 TABLET ORAL DAILY
Status: DISCONTINUED | OUTPATIENT
Start: 2018-08-03 | End: 2018-08-03 | Stop reason: HOSPADM

## 2018-08-02 RX ORDER — ONDANSETRON 2 MG/ML
4 INJECTION INTRAMUSCULAR; INTRAVENOUS EVERY 8 HOURS PRN
Status: DISCONTINUED | OUTPATIENT
Start: 2018-08-02 | End: 2018-08-02

## 2018-08-02 RX ORDER — ONDANSETRON 8 MG/1
8 TABLET, ORALLY DISINTEGRATING ORAL EVERY 8 HOURS PRN
Status: DISCONTINUED | OUTPATIENT
Start: 2018-08-02 | End: 2018-08-03 | Stop reason: HOSPADM

## 2018-08-02 RX ORDER — OXYCODONE HYDROCHLORIDE 5 MG/1
5 TABLET ORAL EVERY 6 HOURS PRN
Status: DISCONTINUED | OUTPATIENT
Start: 2018-08-02 | End: 2018-08-02

## 2018-08-02 NOTE — PROGRESS NOTES
Group Psychotherapy (PhD/LCSW)    Site: [facility]    Clinical status of patient: Intensive Outpatient Program (IOP)    Date: 8/2/2018    Group Focus: CBT Group Psychotherapy    Length of service: 62950 - 45-50 minutes    Number of patients in attendance: 11    Referred by: Addictive Behavior Unit Treatment Team    Target symptoms: Alcohol Abuse    Patient's response to treatment: Active Listening and Self-disclosure    Progress toward goals: Progressing adequately    Interval History: Session focus was Cognitive Restructuring:  Identifying unhelpful and helpful thoughts.  Patients were provided with a worksheet on unhelpful thinking styles and how to identify helpful thoughts.  Patient identified wanting to work on the unhelpful thinking style(s) of jumping to conclusions.     Diagnosis: alcohol use disorder, severe, in early remission    Plan: Continue treatment on ABU

## 2018-08-02 NOTE — TELEPHONE ENCOUNTER
----- Message from Marquise Rodriguez sent at 8/2/2018 12:58 PM CDT -----  Contact: Patient mom      ----- Message -----  From: Georgiana Odom RN  Sent: 8/2/2018  11:26 AM  To: [facility] Pre-Kidney Transplant Non-Clinical    This is part of his kidney evaluation. Please call patient.      ----- Message -----  From: Jolly Rosario  Sent: 8/2/2018  10:07 AM  To: [facility] Post-Liver Transplant Non-Clinical    Needs Advice    Reason for call:    Wishes to know why nuclear stress test was scheduled on 8/4/18  Communication Preference: [phone] or cell [phone]  Additional Information: n/a

## 2018-08-02 NOTE — TELEPHONE ENCOUNTER
sent to dr peterson for advice on what meds pt can safely take for possible gout. Pt saw internal med MD yesterday.

## 2018-08-02 NOTE — TELEPHONE ENCOUNTER
----- Message from Jolly Rosario sent at 8/2/2018  3:42 PM CDT -----  Contact: Pt wife  Needs Advice    Reason for call:    Pt PCP asked if pt could take steroids for gout. Assuming that question was suppose to be directed to transplant coordinator. Please call after investigating.   Communication Preference: wife cell [phone] or  pt cell [phone]  Additional Information: n/a

## 2018-08-02 NOTE — PLAN OF CARE
08/02/18 1000   Activity/Group Therapy Checklist   Group Educational  (life balance )   Attendance Attended   Follows Direction Followed directions   Group Interactions/Observations Interacted appropriately   Affect/Mood Range Normal range   Affect/Mood Display Appropriate   Goal Progression Progressing

## 2018-08-02 NOTE — TELEPHONE ENCOUNTER
Received msg from Dr Rogers that we need to admit pt. For septic knee vs gout flare. Admissions called. Pt to be admitted to LTS svc to Dr Patel. Dr Rogers talked with him. Report called to Resident Dr. Singh, and to Margarita tsu charge. Left a detailed VM for pt to report to ED admission desk to be admitted for antibiotics and further testing of knee. Night Coordinator Kong will call pt to make sure he received msg.

## 2018-08-02 NOTE — PROGRESS NOTES
Subjective:       Patient ID: Jhonny Diana is a 28 y.o. male.    Chief Complaint: Knee Pain    Liver transplant recipient with marked CKD presents with red hot left knee that is painful.  Takes tylenol for pain and is using a lidocaine patch      Knee Pain    There was no injury mechanism. The pain is present in the left knee. The quality of the pain is described as aching. The pain is at a severity of 6/10. The pain is moderate. The pain has been constant since onset. Associated symptoms include an inability to bear weight and a loss of motion. He reports no foreign bodies present. The symptoms are aggravated by movement, weight bearing and palpation. He has tried acetaminophen for the symptoms. The treatment provided no relief.     Review of Systems   Constitutional: Negative for activity change, appetite change and fever.   HENT: Negative for congestion, postnasal drip and sore throat.    Respiratory: Negative for cough, shortness of breath and wheezing.    Cardiovascular: Negative for chest pain and palpitations.   Gastrointestinal: Negative for abdominal pain, blood in stool, constipation, diarrhea, nausea and vomiting.   Genitourinary: Negative for decreased urine volume, difficulty urinating, flank pain and frequency.   Musculoskeletal: Negative for arthralgias.   Neurological: Negative for dizziness, weakness and headaches.       Objective:      Physical Exam   Musculoskeletal:        Left knee: He exhibits decreased range of motion, swelling, effusion, erythema and bony tenderness. He exhibits no ecchymosis, no deformity, no laceration, normal alignment and no LCL laxity. Tenderness found. Lateral joint line tenderness noted.        Legs:      Assessment:       1. Pain and swelling of left knee        Plan:   Jhonny was seen today for knee pain.    Diagnoses and all orders for this visit:    Pain and swelling of left knee  -     Uric acid; Future  -     Ambulatory referral to Orthopedics    Gout and septic  joint are my 2 primary worries.

## 2018-08-02 NOTE — PROGRESS NOTES
Liver transplant recipient with severe CKD presents with painful red left knee.  Has been dx'd with cellulitis of both lower legs and started on doxy yesterday, then knee got red, and tender last night.  No trauma.  No rashes

## 2018-08-02 NOTE — PSYCH
Pt mother called at apprx 8:50am and reported that pt will be late to program due to appt. SW questioned if appt would need an extended period of time. Pt mother reported that she does not know. SW informed pt mother to keep tx team updated on scheduling conflicts.

## 2018-08-02 NOTE — TELEPHONE ENCOUNTER
Nurse spoke with mom and confirmed that patient is schedule for a stress test on Sept. 4, 2018 and not Aug, 4, 2018. Patient's mom had questions as to why he needed a stress test. She was informed that the stress is apart of the kidney transplant workup. Patient's mom verbalized understanding of the above information.

## 2018-08-03 ENCOUNTER — TELEPHONE (OUTPATIENT)
Dept: HEPATOLOGY | Facility: CLINIC | Age: 28
End: 2018-08-03

## 2018-08-03 ENCOUNTER — HOSPITAL ENCOUNTER (OUTPATIENT)
Dept: PSYCHIATRY | Facility: HOSPITAL | Age: 28
Discharge: HOME | End: 2018-08-03
Attending: PSYCHIATRY & NEUROLOGY
Payer: COMMERCIAL

## 2018-08-03 VITALS — DIASTOLIC BLOOD PRESSURE: 84 MMHG | RESPIRATION RATE: 16 BRPM | HEART RATE: 97 BPM | SYSTOLIC BLOOD PRESSURE: 151 MMHG

## 2018-08-03 DIAGNOSIS — R45.89 ANXIETY ABOUT HEALTH: ICD-10-CM

## 2018-08-03 DIAGNOSIS — F10.21 ALCOHOL USE DISORDER, SEVERE, IN EARLY REMISSION: ICD-10-CM

## 2018-08-03 LAB
AMPHET+METHAMPHET UR QL: NEGATIVE
BARBITURATES UR QL SCN>200 NG/ML: NEGATIVE
BENZODIAZ UR QL SCN>200 NG/ML: NEGATIVE
BODY FLD TYPE: NORMAL
BREATH ALCOHOL: 0
BZE UR QL SCN: NEGATIVE
CANNABINOIDS UR QL SCN: NEGATIVE
CREAT UR-MCNC: 81 MG/DL
CRYSTALS FLD MICRO: NEGATIVE
ETHANOL UR-MCNC: <10 MG/DL
GRAM STN SPEC: NORMAL
GRAM STN SPEC: NORMAL
METHADONE UR QL SCN>300 NG/ML: NEGATIVE
OPIATES UR QL SCN: NEGATIVE
PATH INTERP FLD-IMP: NORMAL
PCP UR QL SCN>25 NG/ML: NEGATIVE
TOXICOLOGY INFORMATION: NORMAL

## 2018-08-03 PROCEDURE — 80307 DRUG TEST PRSMV CHEM ANLYZR: CPT

## 2018-08-03 PROCEDURE — 90853 GROUP PSYCHOTHERAPY: CPT

## 2018-08-03 PROCEDURE — 90853 GROUP PSYCHOTHERAPY: CPT | Mod: ,,, | Performed by: PSYCHOLOGIST

## 2018-08-03 PROCEDURE — G0177 OPPS/PHP; TRAIN & EDUC SERV: HCPCS

## 2018-08-03 PROCEDURE — 90853 GROUP PSYCHOTHERAPY: CPT | Mod: NTX | Performed by: SOCIAL WORKER

## 2018-08-03 RX ORDER — ALLOPURINOL 100 MG/1
100 TABLET ORAL DAILY
Qty: 30 TABLET | Refills: 11 | Status: SHIPPED | OUTPATIENT
Start: 2018-08-03 | End: 2018-08-27

## 2018-08-03 NOTE — PROGRESS NOTES
Patient was admitted from home for concern for septic joint.  On admission patient was not in any knee pain and was unsure why he was at the hospital.  He was refusing any lab draws and refusing to cooperate with nursing assessments.  I discussed with him the importance of work up of his possible septic joint and risks of no treatment.  He and his mother acknowledged understanding and left AMA.  D/w Dr Carrasco - LTS fellow on call      Ashkan Cheung MD

## 2018-08-03 NOTE — PROGRESS NOTES
Group Psychotherapy (PhD/LCSW)    Site: [facility]    Clinical status of patient: Intensive Outpatient Program (IOP)    Date: 8/3/2018    Group Focus: Mindfulness    Length of service: 41552 - 45-50 minutes    Number of patients in attendance: 11    Referred by: Addictive Behavior Unit Treatment Team    Target symptoms: Alcohol Abuse    Patient's response to treatment: Active Listening and Self-disclosure    Progress toward goals: Progressing adequately    Interval History: Session focus was Mindfulness.  Patients were introduced to the goals of mindfulness practice and provided with several skills to formally and informally practice mindfulness.    Diagnosis: alcohol use disorder, severe, in early remission    Plan: Continue treatment on ABU

## 2018-08-03 NOTE — CONSULTS
"[facility]  Orthopedics  Consult Note    Patient Name: Jhonny Diana  MRN: [id number]  Admission Date: 8/2/2018  Hospital Length of Stay: 0 days  Attending Provider: Sean Patel MD  Primary Care Provider: Primary Doctor No    Patient information was obtained from patient and ER records.     Inpatient consult to Orthopedic Surgery  Consult performed by: [facility] LOPEZ  Consult ordered by: PAUL LORD        Subjective:     Principal Problem:<principal problem not specified>    Chief Complaint: No chief complaint on file.       HPI: Jhonny Diana is a 28 y.o. male who is s/p Liver transplant  With CKD who has bilateral LE cellulitis currently being treated with doxycycline.  Orthopaedics consulted for possible septic left knee joint.  Patient has new diagnosis of gout.  States two days ago he began to have left knee pain which has since gotten markedly better.  Rates his pain as "less than a needle stick currently.  He denies numbness and tingling and denies pain with ROM.      Past Medical History:   Diagnosis Date    Alcoholic hepatitis with ascites     Anemia     ESRD on dialysis     History of hematemesis 9/28/2017    Hypertension     Seizures     Thrombocytopenia     Transplanted liver     10/2017       Past Surgical History:   Procedure Laterality Date    APPENDECTOMY      ERCP      ERCP N/A 7/2/2018    Procedure: ERCP;  Surgeon: Solis Villanueva MD;  Location: [facility] ([address] [place] [address]);  Service: Endoscopy;  Laterality: N/A;  dialysis/cirrhosis/labs prior to ERCP/svn  platelet    ESOPHAGOGASTRODUODENOSCOPY      LIVER TRANSPLANT      10/2017       Review of patient's allergies indicates:   Allergen Reactions    Bactrim [sulfamethoxazole-trimethoprim] Other (See Comments)     Mookie Trell Syndrome       Current Facility-Administered Medications   Medication    acetaminophen tablet 650 mg    [START ON 8/3/2018] amLODIPine tablet 5 mg    bisacodyl EC tablet 10 mg    " [START ON 8/3/2018] doxycycline tablet 100 mg    [START ON 8/3/2018] famotidine tablet 20 mg    [START ON 8/3/2018] levothyroxine tablet 75 mcg    ondansetron disintegrating tablet 8 mg    [START ON 8/3/2018] sevelamer carbonate tablet 1,600 mg    [START ON 8/3/2018] sirolimus tablet 1 mg     Family History     Problem Relation (Age of Onset)    No Known Problems Mother        Social History Main Topics    Smoking status: Former Smoker     Packs/day: 1.00     Years: 10.00     Types: Cigarettes     Start date: 2/19/2017    Smokeless tobacco: Never Used    Alcohol use No      Comment: a fifth of liquor daily for years, cut back over last 2 months    Drug use: No    Sexual activity: Not on file     ROS     Constitutional: negative for fevers  Eyes: no visual changes  ENT: negative for hearing loss  Respiratory: negative for dyspnea  Cardiovascular: negative for chest pain  Gastrointestinal: negative for abdominal pain  Genitourinary: negative for dysuria  Neurological: negative for headaches  Behavioral/Psych: negative for hallucinations  Endocrine: negative for temperature intolerance    Objective:     Vital Signs (Most Recent):  Temp: 99.4 °F (37.4 °C) (08/02/18 2203)  Pulse: 104 (08/02/18 2203)  Resp: 18 (08/02/18 2203)  BP: 125/65 (08/02/18 2203)  SpO2: 100 % (08/02/18 2203) Vital Signs (24h Range):  Temp:  [99.4 °F (37.4 °C)] 99.4 °F (37.4 °C)  Pulse:  [104] 104  Resp:  [18] 18  SpO2:  [100 %] 100 %  BP: (125)/(65) 125/65           There is no height or weight on file to calculate BMI.    No intake or output data in the 24 hours ending 08/02/18 2301    Ortho/SPM Exam     Gen:  No acute distress  CV:  Peripherally well-perfused.  Pulses 2+ bilaterally.  Lungs:  Normal respiratory effort.  Abdomen:  Soft, non-tender, non-distended  Head/Neck:  Normocephalic.  Atraumatic. No TTP, AROM and PROM intact without pain  Neuro:  CN intact without deficit, SILT throughout B/L Upper & Lower Extremities  Pelvis: No  TTP, Stable to direct anterior pressure over ASIS.    MSK:  Bilateral UE:  - no obvious deformity and no ttp  - AROM and PROM of the intact  - AIN/PIN/Radial/Median/Ulnar Nerves assessed in isolation without deficit  - SILT throughout  - Radial & Ulnar arteries palpated 2+  - Capillary Refill <3s    LE:  - erythema of bilateral lower extremities from mid calf distally consistent with known cellulitis  -no erythema, no swelling of left knee joint  - AROM and PROM of left knee is full and completely without pain  - TA/EHL/Gastroc/FHL assessed in isolation without deficit  - SILT throughout  - DP and PT palpated  2+  - Capillary Refill <3s          Significant Labs: CBC: No results for input(s): WBC, HGB, HCT, PLT in the last 48 hours.  CMP: No results for input(s): NA, K, CL, CO2, GLU, BUN, CREATININE, CALCIUM, PROT, ALBUMIN, BILITOT, ALKPHOS, AST, ALT, ANIONGAP, EGFRNONAA in the last 48 hours.    Invalid input(s): ESTGFAFRICA  CRP: No results for input(s): CRP in the last 48 hours.  All pertinent labs within the past 24 hours have been reviewed.    Significant Imaging: I have reviewed and interpreted all pertinent imaging results/findings.    Assessment/Plan:     Left knee pain    Jhonny Diana is a 28 y.o. male with cellulitis and s/p liver transplant has new diagnosis of gout and significantly improved left knee pain.    -Uric acid elevated, afebrile, ESR and CRP pending  -Clinical suspicion for septic knee is very low given almost no pain with ROM and no erythema or swelling of left knee in addition to elevated uric acid and new gout diagnosis  -Joint aspiration performed at bedside yielded 7cc of clear joint fluid  -Sent for analysis - due to miscommunication between lab and nurse the wbc count with diff was discontinued without my authorization.  Patient was not available for re aspiration and regardless gram stain is negative with few WBCs.  Very very low concern for septic knee.  Will follow up cultures.                  Thank you for your consult. I will follow-up with patient. Please contact us if you have any additional questions.    Devante Hinds MD  Orthopedics  [facility]

## 2018-08-03 NOTE — NURSING
Staff nurse placed call to MD- lab declined knee aspiration fluid 2/2 labeling - to determine if second specimen is to be collected - awaiting return call

## 2018-08-03 NOTE — HPI
"Jhonny Diana is a 28 y.o. male who is s/p Liver transplant  With CKD who has bilateral LE cellulitis currently being treated with doxycycline.  Orthopaedics consulted for possible septic left knee joint.  Patient has new diagnosis of gout.  States two days ago he began to have left knee pain which has since gotten markedly better.  Rates his pain as "less than a needle stick currently.  He denies numbness and tingling and denies pain with ROM.    "

## 2018-08-03 NOTE — PROGRESS NOTES
8/3/2018 5:32 PM  Name: Jhonny Diana  : 1990  Start Date: 18    [facility] Intensive Outpatient Program   Progress Note    Status: Intensive Outpatient Program (IOP)    HPI:  Jhonny Diana is a 27 y.o. male who presents for admission to ABU with principal problem of opioid use, as well as hx of alcohol use disorder.      Pt is s/p liver transplant in 10/2017 due to acute alcoholic hepatitis. He has a hx of heavy alcohol use since his teens. He required emergent liver transplantation prior to being able to undergo any type of substance abuse treatment. It was recommended that pt attend the ABU program after he received his liver transplant as he was deemed to be very high risk for relapse post-transplant. However pt never followed up in the ABU due to a number of post-op medical complications including ESRD requiring HD (Mon/Fri). In 2018 the addiction psychiatry team was consulted while pt was admitted to the hospital due to concerns for opioid addiction. Pt was started on Suboxone and again recommended to f/u with the ABU for substance abuse treatment.      Today pt reports that he is feeling well physically. He confirms hx of alcohol use since the age of 18. He escalated to drinking a fifth of liquor daily in his early to mid twenties. He attempted to cut back a number of times but was unsuccessful. He has attended AA meetings in the past but has never been to a formal rehab program. He minimizes the effects that alcohol had in his life as he states that he mostly drank by himself, so it did not affect his relationships. He does admit that alcohol was a problem for him however, and recognizes the need for continued life long sobriety following his liver transplant.      Regarding his opiate use, pt reports using 10 mg of oxycontin every 4 hours as was prescribed to him. He reports that he began getting prescriptions for opiates a few months prior to his transplant, and continued to use them until this  "month when he was transitioned to Suboxone. He denies ever buying opiates or using meds which were not prescribed to him. He denies ever injecting or snorting them. He does report feeling better both physically and mentally on Suboxone. He reports feeling like his head is clearer and pain is better controlled. He denies any current side effects from Suboxone or cravings to use other opiates.      Last drink/use: Last use of alcohol 9/2017. Last use of narcotic pain medications (other than Suboxone) 6/14/18  Average consumption: drank a fifth of liquor daily. Used Oxycontin 10 mg PO q4h  Withdrawal: yes, hx of tremors and anxiety from alcohol withdrawal. No hx of complicated alcohol withdrawal or opiate withdrawal  Spouse/partner consumption: n/a  Rehab/Detox: no  AA/NA: yes, has been to AA before. Went for approx 1 year prior to his transplant. States "not a fan" due to Hindu aspect    SUBJECTIVE:     Interval Hx:   Upon my evaluation, patient is linear, organized, alert and oriented x 4 with full range of affect. Pt denies any physical symptoms.   Pt had a bad day in the [facility] ED yesterday, was treated for gout in the left knee, ortho forgot to label the first synovial fluid sample and wanted to tap his knee x 2, pt refused. He states his mood is "pretty good despite everything that happened yesterday."  Patient is still awaiting to see if he will need a kidney transplant, has an appointment August 14th.   Reports normal sleep and appetite is returning to normalcy after coming off of prednisone.    Pt is willing to decrease his suboxone, but is concerned his abdominal pain may return.  Currently down to suboxone 2/2 mg, will hold there for now.    Medication Side Effects: None  Cravings: Denied cravings for alcohol.  No other acute psychiatric issues reported at this time.    Scheduled Meds:   Current Outpatient Prescriptions on File Prior to Encounter   Medication Sig Dispense Refill    amLODIPine (NORVASC) " 5 MG tablet Take 1 tablet (5 mg total) by mouth once daily. 30 tablet 11    bisacodyl (DULCOLAX) 5 mg EC tablet Take 2 tablets (10 mg total) by mouth daily as needed for Constipation. 60 tablet 5    buprenorphine-naloxone 2-0.5 mg (SUBOXONE) 2-0.5 mg Film Take 2 film sublingually in the morning and 1 film sublingually in the evening 90 each 0    doxycycline (VIBRAMYCIN) 100 MG Cap Take 1 capsule (100 mg total) by mouth every 12 (twelve) hours. For 7 days 14 capsule 0    ergocalciferol (VITAMIN D2) 50,000 unit Cap take 1 capsule by mouth every 7 days 4 capsule 2    famotidine (PEPCID) 20 MG tablet Take 1 tablet (20 mg total) by mouth every evening. 30 tablet 11    folic acid (FOLVITE) 1 MG tablet Take 1 tablet (1 mg total) by mouth once daily. 30 tablet 2    levothyroxine (SYNTHROID) 75 MCG tablet Take 1 tablet (75 mcg total) by mouth before breakfast. 30 tablet 11    ondansetron (ZOFRAN-ODT) 8 MG TbDL Take 1 tablet (8 mg total) by mouth every 8 (eight) hours as needed (nausea). 30 tablet 1    sevelamer carbonate (RENVELA) 800 mg Tab Take 2 tablets (1,600 mg total) by mouth 3 (three) times daily with meals. 180 tablet 11    sirolimus (RAPAMUNE) 1 MG Tab Take 1 tablet (1 mg total) by mouth once daily. 30 tablet 11    sirolimus 0.5 mg Tab Take 1 tablet (0.5 mg total) by mouth once daily. Patient to take a total of 1.5mg dose daily ( One 1mg tablet, and one 0.5 tablet) 30 tablet 11    sodium bicarbonate 650 MG tablet Take 1 tablet (650 mg total) by mouth 2 (two) times daily. 60 tablet 11    torsemide (DEMADEX) 20 MG Tab Take 3 tablets (60 mg total) by mouth once daily. 90 tablet 2    ursodiol (ACTIGALL) 300 mg capsule Take 1 capsule (300 mg total) by mouth 2 (two) times daily. 60 capsule 11     Current Facility-Administered Medications on File Prior to Encounter   Medication Dose Route Frequency Provider Last Rate Last Dose    [DISCONTINUED] acetaminophen tablet 650 mg  650 mg Oral Q6H PRN Ashkan Yeager  "MD Jonatan        [DISCONTINUED] amLODIPine tablet 5 mg  5 mg Oral Daily Ashkan Cheung MD        [DISCONTINUED] bisacodyl EC tablet 10 mg  10 mg Oral Daily PRN Ashkan Cheung MD        [DISCONTINUED] dextrose 5 % and 0.9 % NaCl with KCl 20 mEq infusion   Intravenous Continuous Dannie Singh MD        [DISCONTINUED] doxycycline tablet 100 mg  100 mg Oral Q12H Ashkan Cheung MD        [DISCONTINUED] famotidine tablet 20 mg  20 mg Oral QHS Ashkan Cheung MD        [DISCONTINUED] levothyroxine tablet 75 mcg  75 mcg Oral Before breakfast Ashkan Cheung MD        [DISCONTINUED] ondansetron disintegrating tablet 8 mg  8 mg Oral Q8H PRN Ashkan Cheung MD        [DISCONTINUED] ondansetron injection 4 mg  4 mg Intravenous Q8H PRN Dannie Singh MD        [DISCONTINUED] oxyCODONE immediate release tablet 5 mg  5 mg Oral Q6H PRN Dannie Singh MD        [DISCONTINUED] sevelamer carbonate tablet 1,600 mg  1,600 mg Oral TID WM Ashkan Cheung MD        [DISCONTINUED] sirolimus tablet 1 mg  1 mg Oral Daily Ashkan Cheung MD         Allergies:  Bactrim [sulfamethoxazole-trimethoprim]    Medical ROS:  See H&P dated 6/29/18 for ROS.     OBJECTIVE:     Vital Signs (Most Recent):  There were no vitals filed for this visit.    Mental Status Exam:  Appearance: casually dressed,    Behavior/Cooperation: guarded, eye contact normal  Speech: normal tone, normal rate, normal pitch, normal volume  Mood "good"  Affect: Mood congruent, Appropriate  Thought Process: goal-directed  Thought Content: normal, no suicidality, no homicidality, delusions, or paranoia  Orientation: grossly intact  Memory: Grossly intact  Attention Span/Concentration: Normal  Cognition: grossly intact  Insight: limited  Judgment: limited    Laboratory:  Recent Results (from the past 168 hour(s))   AFP tumor marker    Collection Time: 07/27/18 10:37 AM   Result Value Ref Range    AFP 7.3 0.0 - 8.4 " ng/mL   Protime-INR    Collection Time: 07/27/18 10:37 AM   Result Value Ref Range    Prothrombin Time 9.9 9.0 - 12.5 sec    INR 0.9 0.8 - 1.2   Sirolimus level    Collection Time: 07/27/18 10:37 AM   Result Value Ref Range    Sirolimus Lvl 3.3 (L) 4.0 - 20.0 ng/mL   Bilirubin, direct    Collection Time: 07/27/18 10:37 AM   Result Value Ref Range    Bilirubin, Direct 0.3 0.1 - 0.3 mg/dL   Rapid BMT CBC with Diff    Collection Time: 07/27/18 10:37 AM   Result Value Ref Range    WBC 6.13 3.90 - 12.70 K/uL    RBC 3.42 (L) 4.60 - 6.20 M/uL    Hemoglobin 10.8 (L) 14.0 - 18.0 g/dL    Hematocrit 31.9 (L) 40.0 - 54.0 %    MCV 93 82 - 98 fL    MCH 31.6 (H) 27.0 - 31.0 pg    MCHC 33.9 32.0 - 36.0 g/dL    RDW 14.2 11.5 - 14.5 %    Platelets 60 (L) 150 - 350 K/uL    MPV 10.5 9.2 - 12.9 fL    Immature Granulocytes 0.5 0.0 - 0.5 %    Gran # (ANC) 4.0 1.8 - 7.7 K/uL    Immature Grans (Abs) 0.03 0.00 - 0.04 K/uL    Lymph # 1.2 1.0 - 4.8 K/uL    Mono # 0.8 0.3 - 1.0 K/uL    Eos # 0.1 0.0 - 0.5 K/uL    Baso # 0.02 0.00 - 0.20 K/uL    nRBC 0 0 /100 WBC    Gran% 65.5 38.0 - 73.0 %    Lymph% 20.2 18.0 - 48.0 %    Mono% 12.2 4.0 - 15.0 %    Eosinophil% 1.3 0.0 - 8.0 %    Basophil% 0.3 0.0 - 1.9 %    Differential Method Automated    Comprehensive metabolic panel    Collection Time: 07/27/18 10:37 AM   Result Value Ref Range    Sodium 140 136 - 145 mmol/L    Potassium 4.0 3.5 - 5.1 mmol/L    Chloride 106 95 - 110 mmol/L    CO2 20 (L) 23 - 29 mmol/L    Glucose 118 (H) 70 - 110 mg/dL    BUN, Bld 80 (H) 6 - 20 mg/dL    Creatinine 2.6 (H) 0.5 - 1.4 mg/dL    Calcium 9.7 8.7 - 10.5 mg/dL    Total Protein 7.4 6.0 - 8.4 g/dL    Albumin 3.5 3.5 - 5.2 g/dL    Total Bilirubin 0.6 0.1 - 1.0 mg/dL    Alkaline Phosphatase 165 (H) 55 - 135 U/L    AST 26 10 - 40 U/L    ALT 30 10 - 44 U/L    Anion Gap 14 8 - 16 mmol/L    eGFR if African American 36.9 (A) >60 mL/min/1.73 m^2    eGFR if non  31.9 (A) >60 mL/min/1.73 m^2   Toxicology screen,  urine    Collection Time: 07/27/18 11:24 AM   Result Value Ref Range    Alcohol, Urine <10 <10 mg/dL    Benzodiazepines Negative     Methadone metabolites Negative     Cocaine (Metab.) Negative     Opiate Scrn, Ur Negative     Barbiturate Screen, Ur Negative     Amphetamine Screen, Ur Negative     THC Negative     Phencyclidine Negative     Creatinine, Random Ur 51.0 23.0 - 375.0 mg/dL    Toxicology Information SEE COMMENT    POCT BREATH ALCOHOL TEST    Collection Time: 07/27/18 11:25 AM   Result Value Ref Range    Breath Alcohol 0.000    Toxicology screen, urine    Collection Time: 07/30/18 10:51 AM   Result Value Ref Range    Alcohol, Urine <10 <10 mg/dL    Benzodiazepines Negative     Methadone metabolites Negative     Cocaine (Metab.) Negative     Opiate Scrn, Ur Negative     Barbiturate Screen, Ur Negative     Amphetamine Screen, Ur Negative     THC Negative     Phencyclidine Negative     Creatinine, Random Ur 37.0 23.0 - 375.0 mg/dL    Toxicology Information SEE COMMENT    POCT BREATH ALCOHOL TEST    Collection Time: 07/30/18 10:51 AM   Result Value Ref Range    Breath Alcohol 0.000    CBC auto differential    Collection Time: 07/31/18  8:48 AM   Result Value Ref Range    WBC 4.26 3.90 - 12.70 K/uL    RBC 3.51 (L) 4.60 - 6.20 M/uL    Hemoglobin 10.5 (L) 14.0 - 18.0 g/dL    Hematocrit 32.5 (L) 40.0 - 54.0 %    MCV 93 82 - 98 fL    MCH 29.9 27.0 - 31.0 pg    MCHC 32.3 32.0 - 36.0 g/dL    RDW 14.1 11.5 - 14.5 %    Platelets 44 (L) 150 - 350 K/uL    MPV 10.1 9.2 - 12.9 fL    Immature Granulocytes 0.5 0.0 - 0.5 %    Gran # (ANC) 2.6 1.8 - 7.7 K/uL    Immature Grans (Abs) 0.02 0.00 - 0.04 K/uL    Lymph # 1.0 1.0 - 4.8 K/uL    Mono # 0.6 0.3 - 1.0 K/uL    Eos # 0.1 0.0 - 0.5 K/uL    Baso # 0.02 0.00 - 0.20 K/uL    nRBC 0 0 /100 WBC    Gran% 59.8 38.0 - 73.0 %    Lymph% 23.5 18.0 - 48.0 %    Mono% 14.1 4.0 - 15.0 %    Eosinophil% 1.6 0.0 - 8.0 %    Basophil% 0.5 0.0 - 1.9 %    Differential Method Automated     Comprehensive metabolic panel    Collection Time: 07/31/18  8:48 AM   Result Value Ref Range    Sodium 140 136 - 145 mmol/L    Potassium 4.1 3.5 - 5.1 mmol/L    Chloride 105 95 - 110 mmol/L    CO2 24 23 - 29 mmol/L    Glucose 123 (H) 70 - 110 mg/dL    BUN, Bld 83 (H) 6 - 20 mg/dL    Creatinine 2.3 (H) 0.5 - 1.4 mg/dL    Calcium 9.7 8.7 - 10.5 mg/dL    Total Protein 7.3 6.0 - 8.4 g/dL    Albumin 3.3 (L) 3.5 - 5.2 g/dL    Total Bilirubin 0.7 0.1 - 1.0 mg/dL    Alkaline Phosphatase 168 (H) 55 - 135 U/L    AST 29 10 - 40 U/L    ALT 32 10 - 44 U/L    Anion Gap 11 8 - 16 mmol/L    eGFR if African American 42.8 (A) >60 mL/min/1.73 m^2    eGFR if non  37.0 (A) >60 mL/min/1.73 m^2   Sirolimus level    Collection Time: 07/31/18  8:48 AM   Result Value Ref Range    Sirolimus Lvl 4.5 4.0 - 20.0 ng/mL   Bilirubin, direct    Collection Time: 07/31/18  8:48 AM   Result Value Ref Range    Bilirubin, Direct 0.4 (H) 0.1 - 0.3 mg/dL   CMV DNA, quantitative, PCR    Collection Time: 07/31/18  8:48 AM   Result Value Ref Range    Cytomegalovirus PCR, Quant Test Not Performed    POCT BREATH ALCOHOL TEST    Collection Time: 07/31/18 11:00 AM   Result Value Ref Range    Breath Alcohol 0.000    Toxicology screen, urine    Collection Time: 08/01/18 11:04 AM   Result Value Ref Range    Alcohol, Urine <10 <10 mg/dL    Benzodiazepines Negative     Methadone metabolites Negative     Cocaine (Metab.) Negative     Opiate Scrn, Ur Negative     Barbiturate Screen, Ur Negative     Amphetamine Screen, Ur Negative     THC Negative     Phencyclidine Negative     Creatinine, Random Ur 34.0 23.0 - 375.0 mg/dL    Toxicology Information SEE COMMENT    POCT BREATH ALCOHOL TEST    Collection Time: 08/01/18 11:05 AM   Result Value Ref Range    Breath Alcohol 0.000    Uric acid    Collection Time: 08/01/18  6:45 PM   Result Value Ref Range    Uric Acid 12.1 (H) 3.4 - 7.0 mg/dL   POCT BREATH ALCOHOL TEST    Collection Time: 08/02/18 11:41 AM    Result Value Ref Range    Breath Alcohol 0.000    Gram stain    Collection Time: 08/02/18 10:33 PM   Result Value Ref Range    Gram Stain Result Rare WBC's     Gram Stain Result No organisms seen    Body fluid crystal    Collection Time: 08/02/18 10:33 PM   Result Value Ref Range    Body Fluid Source, Crystal Exam Joint     Body Fluid Crystal Negative Negative   Pathologist Review of Laboratory Test    Collection Time: 08/02/18 10:33 PM   Result Value Ref Range    Pathologist Review, Body Fluid REVIEWED      ASSESSMENT:     Alcohol use disorder, severe, in early remission  Opioid use disorder, mild  Unspecified depression (MDD vs. Adjustment disorder)    Patient remains high risk of relapse on opiates, insight and judgement remain poor. Requires continued monitoring in IOP at this time. Currently on Suboxone for opiate agonist treatment, will continue to taper down before discharge.  Pt may want to continue suboxone outpatient.  Patient with possible upcoming kidney transplant which would cause risk for relapse on opiates.     PLAN:     · Continue patient on ABU protocol.  · Breathalyzer and urine toxicology daily.  · VS daily x3 days.  · Patient counseled on abstinence from alcohol, opiates (other than Suboxone), and all other drugs of abuse.    Medications: Continue current medications. The risks and benefits of medication were discussed with the patient.  · Decreased Suboxone to 2 mg qAM and 2 mg QHS for opiate use disorder and pain on 8/1, pt states his cravings are under control.    · Remeron was discontinued by Dr. Rogers on 7/23/18.    Status: Continue treatment on ABU    Patient's Intervention Response: guarded    Case discussed with Dr. Lyndsay Stratton MD  [facility]  8/3/2018  10:21 AM

## 2018-08-03 NOTE — NURSING
Purple vial, lab bag, and specimen cup all labeled with pts labels at time of collection, Dr Cheung informed only specimen cup could be processed at this time due to purple vial not meeting collection requirements.

## 2018-08-03 NOTE — TELEPHONE ENCOUNTER
----- Message from Anne ROBERSON [facility] sent at 8/3/2018 12:34 PM CDT -----  Contact: Patient  Attn: Lubna Agustin  Patient needs you to call him today but it has to be after 3. He was told t go to the ER to be admitted and everything went wrong.

## 2018-08-03 NOTE — NURSING
"Spoke with lab concerning pt "purple top" lab specimen stating specimen was not correctly labeled. Purple top, specimen cup, and lab bag were all three labeled with pt personal labels. Dr Hinds paged to inform to request further instruction.   "

## 2018-08-03 NOTE — NURSING
Staff nurse notified primary team Dr Cheung that patient is requesting to leave AMA - MD to come see patient

## 2018-08-03 NOTE — NURSING
Pt is refusing IV, IV therapy, medications, and labs at this time. Pt requesting to go home AMA. Dr Cheung notified and AMA paper requested waiting for MD and pt signature at this time.

## 2018-08-03 NOTE — PROGRESS NOTES
Group Psychotherapy (PhD/LCSW)    Site: [facility]    Clinical status of patient: Intensive Outpatient Program (IOP)    Date: 8/3/2018    Group Focus: Psychodynamic Group Psychotherapy    Length of service: 91023 - 45-50 minutes    Number of patients in attendance: 11    Referred by: Addictive Behavior Unit Treatment Team    Target symptoms: Alcohol Abuse    Patient's response to treatment: Active Listening and Self-disclosure    Progress toward goals: Progressing adequately    Interval History: He made a commitment to plan a date to move back to [place].  He provided a lot of feedback to other group members related to contract issues and telling co-workers about the program.  He encouraged others to take steps and to make their own timeline.    Diagnosis: alcohol use disorder, severe, in early remission    Plan: Continue treatment on ABU

## 2018-08-03 NOTE — TELEPHONE ENCOUNTER
Called patient and patient's motherDeborah to notify that pt needs to be admitted to hospital per Dr. Rogers.  No answer, unable to leave voicemail on mother's listed cell phone.  Message left on pt's voicemail to report to [facility] emergency room and notify staff that he has reservation for admission to hospital.  Also instructed to call [facility] upon receipt of message if he has questions or concerns.

## 2018-08-03 NOTE — TELEPHONE ENCOUNTER
Spoke with Jhonny and Sanjuanita, his mom. I reassured him that he does not have acute gout arthritis - no urate or pyrophosphate crystal on joint fluid analysis, rare WBCs, micro- pending. Unlikely for septic joint or gout. Likely due to increase activity  and overload. I recommend him to put ice pack x 3 for knee pain. He does have hyperuricemia, will start on renal dose allopurinol 100 mg daily and recheck uric acid in 2-3 months.

## 2018-08-03 NOTE — NURSING
Pt, MD, and RN signatures obtained and AMA papers were signed and explained to pt, however pt still left AMA. Pt had no IV in place and left with all personal belongings via wheelchair with his mother, pt will follow up with his MD tomorrow am.

## 2018-08-03 NOTE — PLAN OF CARE
08/03/18 1000   Activity/Group Therapy Checklist   Group Addiction Education   Attendance Attended   Follows Direction Followed directions   Group Interactions/Observations Interacted appropriately   Affect/Mood Range Normal range   Affect/Mood Display Appropriate   Goal Progression Progressing

## 2018-08-03 NOTE — TELEPHONE ENCOUNTER
Pt reports that he does not understand why he had to come to hospital.  Notified pt that Dr. Rogers requested admission based on report. Pt is upset about mislabeled specimen while inpatient.   Pt has labs scheduled Monday.  Message sent to Dr. Rogers to notify and advise.  MD spoke to pt.  Pt will start allopurinol 100 mg by mouth once daily, RX sent to pharmacy.

## 2018-08-03 NOTE — SUBJECTIVE & OBJECTIVE
Past Medical History:   Diagnosis Date    Alcoholic hepatitis with ascites     Anemia     ESRD on dialysis     History of hematemesis 9/28/2017    Hypertension     Seizures     Thrombocytopenia     Transplanted liver     10/2017       Past Surgical History:   Procedure Laterality Date    APPENDECTOMY      ERCP      ERCP N/A 7/2/2018    Procedure: ERCP;  Surgeon: Solis Villanueva MD;  Location: [facility] ([address] [place] [address]);  Service: Endoscopy;  Laterality: N/A;  dialysis/cirrhosis/labs prior to ERCP/svn  platelet    ESOPHAGOGASTRODUODENOSCOPY      LIVER TRANSPLANT      10/2017       Review of patient's allergies indicates:   Allergen Reactions    Bactrim [sulfamethoxazole-trimethoprim] Other (See Comments)     Mookie Trell Syndrome       Current Facility-Administered Medications   Medication    acetaminophen tablet 650 mg    [START ON 8/3/2018] amLODIPine tablet 5 mg    bisacodyl EC tablet 10 mg    [START ON 8/3/2018] doxycycline tablet 100 mg    [START ON 8/3/2018] famotidine tablet 20 mg    [START ON 8/3/2018] levothyroxine tablet 75 mcg    ondansetron disintegrating tablet 8 mg    [START ON 8/3/2018] sevelamer carbonate tablet 1,600 mg    [START ON 8/3/2018] sirolimus tablet 1 mg     Family History     Problem Relation (Age of Onset)    No Known Problems Mother        Social History Main Topics    Smoking status: Former Smoker     Packs/day: 1.00     Years: 10.00     Types: Cigarettes     Start date: 2/19/2017    Smokeless tobacco: Never Used    Alcohol use No      Comment: a fifth of liquor daily for years, cut back over last 2 months    Drug use: No    Sexual activity: Not on file     ROS     Constitutional: negative for fevers  Eyes: no visual changes  ENT: negative for hearing loss  Respiratory: negative for dyspnea  Cardiovascular: negative for chest pain  Gastrointestinal: negative for abdominal pain  Genitourinary: negative for dysuria  Neurological: negative for  headaches  Behavioral/Psych: negative for hallucinations  Endocrine: negative for temperature intolerance    Objective:     Vital Signs (Most Recent):  Temp: 99.4 °F (37.4 °C) (08/02/18 2203)  Pulse: 104 (08/02/18 2203)  Resp: 18 (08/02/18 2203)  BP: 125/65 (08/02/18 2203)  SpO2: 100 % (08/02/18 2203) Vital Signs (24h Range):  Temp:  [99.4 °F (37.4 °C)] 99.4 °F (37.4 °C)  Pulse:  [104] 104  Resp:  [18] 18  SpO2:  [100 %] 100 %  BP: (125)/(65) 125/65           There is no height or weight on file to calculate BMI.    No intake or output data in the 24 hours ending 08/02/18 2301    Ortho/SPM Exam     Gen:  No acute distress  CV:  Peripherally well-perfused.  Pulses 2+ bilaterally.  Lungs:  Normal respiratory effort.  Abdomen:  Soft, non-tender, non-distended  Head/Neck:  Normocephalic.  Atraumatic. No TTP, AROM and PROM intact without pain  Neuro:  CN intact without deficit, SILT throughout B/L Upper & Lower Extremities  Pelvis: No TTP, Stable to direct anterior pressure over ASIS.    MSK:  Bilateral UE:  - no obvious deformity and no ttp  - AROM and PROM of the intact  - AIN/PIN/Radial/Median/Ulnar Nerves assessed in isolation without deficit  - SILT throughout  - Radial & Ulnar arteries palpated 2+  - Capillary Refill <3s    LE:  - erythema of bilateral lower extremities from mid calf distally consistent with known cellulitis  -no erythema, no swelling of left knee joint  - AROM and PROM of left knee is full and completely without pain  - TA/EHL/Gastroc/FHL assessed in isolation without deficit  - SILT throughout  - DP and PT palpated  2+  - Capillary Refill <3s          Significant Labs: CBC: No results for input(s): WBC, HGB, HCT, PLT in the last 48 hours.  CMP: No results for input(s): NA, K, CL, CO2, GLU, BUN, CREATININE, CALCIUM, PROT, ALBUMIN, BILITOT, ALKPHOS, AST, ALT, ANIONGAP, EGFRNONAA in the last 48 hours.    Invalid input(s): ESTGFAFRICA  CRP: No results for input(s): CRP in the last 48 hours.  All  pertinent labs within the past 24 hours have been reviewed.    Significant Imaging: I have reviewed and interpreted all pertinent imaging results/findings.

## 2018-08-03 NOTE — DISCHARGE SUMMARY
[facility]  General Surgery  Discharge Summary      Patient Name: Jhonny Diana  MRN: [id number]  Admission Date: 8/2/2018  Hospital Length of Stay: 1 days  Discharge Date and Time:  08/03/2018 1:00 AM  Attending Physician: No att. providers found   Discharging Provider: Dannie Lord MD  Primary Care Provider: Primary Doctor Adriana     HPI: Patient w/ a h/o liver OLTX admitted for concern for a septic joint.  Patient had significant swelling and edema of his knee.  He was admitted and orthopedic surgery was consulted for this concern.  The knee was tapped, but WBC count was discontinued due to a miscommunication.  Patient left AMA and was not available for reaspiration.  Orthopedics with low concern for septic knee.       * No surgery found *     Hospital Course:     Consults:   Consults         Status Ordering Provider     Inpatient consult to Orthopedic Surgery  Once     Provider:  (Not yet assigned)    Completed DANNIE LORD          Significant Diagnostic Studies: Knee joint aspiration    Pending Diagnostic Studies:     None        Final Active Diagnoses:    Diagnosis Date Noted POA    Left knee pain [M25.562] 08/02/2018 Yes    Abdominal pain [R10.9] 05/27/2018 Yes      Problems Resolved During this Admission:    Diagnosis Date Noted Date Resolved POA      Discharged Condition: good    Disposition: Home or Self Care    Follow Up:    Patient Instructions:   No discharge procedures on file.  Medications:  Reconciled Home Medications:      Medication List      CONTINUE taking these medications    amLODIPine 5 MG tablet  Commonly known as:  NORVASC  Take 1 tablet (5 mg total) by mouth once daily.     bisacodyl 5 mg EC tablet  Commonly known as:  DULCOLAX  Take 2 tablets (10 mg total) by mouth daily as needed for Constipation.     doxycycline 100 MG Cap  Commonly known as:  VIBRAMYCIN  Take 1 capsule (100 mg total) by mouth every 12 (twelve) hours. For 7 days     famotidine 20 MG  tablet  Commonly known as:  PEPCID  Take 1 tablet (20 mg total) by mouth every evening.     folic acid 1 MG tablet  Commonly known as:  FOLVITE  Take 1 tablet (1 mg total) by mouth once daily.     levothyroxine 75 MCG tablet  Commonly known as:  SYNTHROID  Take 1 tablet (75 mcg total) by mouth before breakfast.     ondansetron 8 MG Tbdl  Commonly known as:  ZOFRAN-ODT  Take 1 tablet (8 mg total) by mouth every 8 (eight) hours as needed (nausea).     sevelamer carbonate 800 mg Tab  Commonly known as:  RENVELA  Take 2 tablets (1,600 mg total) by mouth 3 (three) times daily with meals.     * sirolimus 1 MG Tab  Commonly known as:  RAPAMUNE  Take 1 tablet (1 mg total) by mouth once daily.     * sirolimus 0.5 mg Tab  Take 1 tablet (0.5 mg total) by mouth once daily. Patient to take a total of 1.5mg dose daily ( One 1mg tablet, and one 0.5 tablet)     sodium bicarbonate 650 MG tablet  Take 1 tablet (650 mg total) by mouth 2 (two) times daily.     SUBOXONE 2-0.5 mg Film  Generic drug:  buprenorphine-naloxone 2-0.5 mg  Take 2 film sublingually in the morning and 1 film sublingually in the evening     torsemide 20 MG Tab  Commonly known as:  DEMADEX  Take 3 tablets (60 mg total) by mouth once daily.     ursodiol 300 mg capsule  Commonly known as:  ACTIGALL  Take 1 capsule (300 mg total) by mouth 2 (two) times daily.     VITAMIN D2 50,000 unit Cap  Generic drug:  ergocalciferol  take 1 capsule by mouth every 7 days        * This list has 2 medication(s) that are the same as other medications prescribed for you. Read the directions carefully, and ask your doctor or other care provider to review them with you.                Dannie Singh MD  General Surgery  [facility]

## 2018-08-03 NOTE — ASSESSMENT & PLAN NOTE
Jhonny Diana is a 28 y.o. male with cellulitis and s/p liver transplant has new diagnosis of gout and significantly improved left knee pain.    -Uric acid elevated, afebrile, ESR and CRP pending  -Clinical suspicion for septic knee is very low given almost no pain with ROM and no erythema or swelling of left knee in addition to elevated uric acid and new gout diagnosis  -Joint aspiration performed at bedside yielded 7cc of clear joint fluid  -Sent for analysis and will follow up results   -NPO as a precaution

## 2018-08-06 ENCOUNTER — HOSPITAL ENCOUNTER (OUTPATIENT)
Dept: PSYCHIATRY | Facility: HOSPITAL | Age: 28
Discharge: HOME | End: 2018-08-06
Attending: PSYCHIATRY & NEUROLOGY
Payer: COMMERCIAL

## 2018-08-06 ENCOUNTER — PATIENT MESSAGE (OUTPATIENT)
Dept: TRANSPLANT | Facility: CLINIC | Age: 28
End: 2018-08-06

## 2018-08-06 VITALS — DIASTOLIC BLOOD PRESSURE: 66 MMHG | SYSTOLIC BLOOD PRESSURE: 125 MMHG | RESPIRATION RATE: 16 BRPM | HEART RATE: 85 BPM

## 2018-08-06 DIAGNOSIS — R45.89 ANXIETY ABOUT HEALTH: ICD-10-CM

## 2018-08-06 DIAGNOSIS — F10.21 ALCOHOL USE DISORDER, SEVERE, IN EARLY REMISSION: Primary | ICD-10-CM

## 2018-08-06 LAB
AMPHET+METHAMPHET UR QL: NEGATIVE
BACTERIA SPEC AEROBE CULT: NORMAL
BARBITURATES UR QL SCN>200 NG/ML: NEGATIVE
BENZODIAZ UR QL SCN>200 NG/ML: NEGATIVE
BREATH ALCOHOL: 0
BZE UR QL SCN: NEGATIVE
CANNABINOIDS UR QL SCN: NEGATIVE
CREAT UR-MCNC: 45 MG/DL
ETHANOL UR-MCNC: <10 MG/DL
METHADONE UR QL SCN>300 NG/ML: NEGATIVE
OPIATES UR QL SCN: NEGATIVE
PCP UR QL SCN>25 NG/ML: NEGATIVE
TOXICOLOGY INFORMATION: NORMAL

## 2018-08-06 PROCEDURE — 90853 GROUP PSYCHOTHERAPY: CPT

## 2018-08-06 PROCEDURE — 90853 GROUP PSYCHOTHERAPY: CPT | Mod: ,,, | Performed by: PSYCHOLOGIST

## 2018-08-06 PROCEDURE — 80307 DRUG TEST PRSMV CHEM ANLYZR: CPT | Mod: NTX

## 2018-08-06 NOTE — PLAN OF CARE
08/06/18 1300   Activity/Group Therapy Checklist   Group Cognitive Therapy   Attendance Attended   Follows Direction Followed directions   Group Interactions/Observations Interacted appropriately;Sharing;Supportive  (minimal discussion of recovery related themes. Silly and attention seeking comments after presenting euphoric recall of h/o drug use. )   Affect/Mood Range Normal range   Affect/Mood Display Appropriate   Goal Progression Progressing

## 2018-08-06 NOTE — PROGRESS NOTES
2018 5:32 PM  Name: Jhonny Diana  : 1990  Start Date: 18    ABU Intensive Outpatient Program   Progress Note    Status: Intensive Outpatient Program (IOP)    HPI:  Jhonny Diana is a 27 y.o. male who presents for admission to ABU with principal problem of opioid use, as well as hx of alcohol use disorder.      Pt is s/p liver transplant in 10/2017 due to acute alcoholic hepatitis. He has a hx of heavy alcohol use since his teens. He required emergent liver transplantation prior to being able to undergo any type of substance abuse treatment. It was recommended that pt attend the ABU program after he received his liver transplant as he was deemed to be very high risk for relapse post-transplant. However pt never followed up in the ABU due to a number of post-op medical complications including ESRD requiring HD (Mon/Fri). In 2018 the addiction psychiatry team was consulted while pt was admitted to the hospital due to concerns for opioid addiction. Pt was started on Suboxone and again recommended to f/u with the ABU for substance abuse treatment.      Today pt reports that he is feeling well physically. He confirms hx of alcohol use since the age of 18. He escalated to drinking a fifth of liquor daily in his early to mid twenties. He attempted to cut back a number of times but was unsuccessful. He has attended AA meetings in the past but has never been to a formal rehab program. He minimizes the effects that alcohol had in his life as he states that he mostly drank by himself, so it did not affect his relationships. He does admit that alcohol was a problem for him however, and recognizes the need for continued life long sobriety following his liver transplant.      Regarding his opiate use, pt reports using 10 mg of oxycontin every 4 hours as was prescribed to him. He reports that he began getting prescriptions for opiates a few months prior to his transplant, and continued to use them until this  "month when he was transitioned to Suboxone. He denies ever buying opiates or using meds which were not prescribed to him. He denies ever injecting or snorting them. He does report feeling better both physically and mentally on Suboxone. He reports feeling like his head is clearer and pain is better controlled. He denies any current side effects from Suboxone or cravings to use other opiates.      Last drink/use: Last use of alcohol 9/2017. Last use of narcotic pain medications (other than Suboxone) 6/14/18  Average consumption: drank a fifth of liquor daily. Used Oxycontin 10 mg PO q4h  Withdrawal: yes, hx of tremors and anxiety from alcohol withdrawal. No hx of complicated alcohol withdrawal or opiate withdrawal  Spouse/partner consumption: n/a  Rehab/Detox: no  AA/NA: yes, has been to AA before. Went for approx 1 year prior to his transplant. States "not a fan" due to Anabaptist aspect    SUBJECTIVE:     Interval Hx:   Upon my evaluation, patient is linear, organized, alert and oriented x 4 with full range of affect. Pt denies any physical symptoms.   Pt states he had a good weekend, is eager to get back to work, which he will start after he is cleared by his hematologist.  Pt states that his mood is "level, good."  Pt should be done with the program on 8/9/18.  Patient is still awaiting to see if he will need a kidney transplant, has an appointment August 14th.   Reports normal sleep, energy and appetite.    Pt would like to stop taking his suboxone, believes his abdominal pain is under control.    Medication Side Effects: None  Cravings: Denied cravings for alcohol.  No other acute psychiatric issues reported at this time.    Scheduled Meds:   Current Outpatient Prescriptions on File Prior to Encounter   Medication Sig Dispense Refill    allopurinol (ZYLOPRIM) 100 MG tablet Take 1 tablet (100 mg total) by mouth once daily. 30 tablet 11    amLODIPine (NORVASC) 5 MG tablet Take 1 tablet (5 mg total) by mouth once " daily. 30 tablet 11    bisacodyl (DULCOLAX) 5 mg EC tablet Take 2 tablets (10 mg total) by mouth daily as needed for Constipation. 60 tablet 5    buprenorphine-naloxone 2-0.5 mg (SUBOXONE) 2-0.5 mg Film Take 2 film sublingually in the morning and 1 film sublingually in the evening 90 each 0    doxycycline (VIBRAMYCIN) 100 MG Cap Take 1 capsule (100 mg total) by mouth every 12 (twelve) hours. For 7 days 14 capsule 0    ergocalciferol (VITAMIN D2) 50,000 unit Cap take 1 capsule by mouth every 7 days 4 capsule 2    famotidine (PEPCID) 20 MG tablet Take 1 tablet (20 mg total) by mouth every evening. 30 tablet 11    folic acid (FOLVITE) 1 MG tablet Take 1 tablet (1 mg total) by mouth once daily. 30 tablet 2    levothyroxine (SYNTHROID) 75 MCG tablet Take 1 tablet (75 mcg total) by mouth before breakfast. 30 tablet 11    ondansetron (ZOFRAN-ODT) 8 MG TbDL Take 1 tablet (8 mg total) by mouth every 8 (eight) hours as needed (nausea). 30 tablet 1    sevelamer carbonate (RENVELA) 800 mg Tab Take 2 tablets (1,600 mg total) by mouth 3 (three) times daily with meals. 180 tablet 11    sirolimus (RAPAMUNE) 1 MG Tab Take 1 tablet (1 mg total) by mouth once daily. 30 tablet 11    sirolimus 0.5 mg Tab Take 1 tablet (0.5 mg total) by mouth once daily. Patient to take a total of 1.5mg dose daily ( One 1mg tablet, and one 0.5 tablet) 30 tablet 11    sodium bicarbonate 650 MG tablet Take 1 tablet (650 mg total) by mouth 2 (two) times daily. 60 tablet 11    torsemide (DEMADEX) 20 MG Tab Take 3 tablets (60 mg total) by mouth once daily. 90 tablet 2    ursodiol (ACTIGALL) 300 mg capsule Take 1 capsule (300 mg total) by mouth 2 (two) times daily. 60 capsule 11     No current facility-administered medications on file prior to encounter.      Allergies:  Bactrim [sulfamethoxazole-trimethoprim]    Medical ROS:  See H&P dated 6/29/18 for ROS.     OBJECTIVE:     Vital Signs (Most Recent):  Vitals:    08/06/18 1054   BP: 125/66  "  Pulse: 85   Resp: 16       Mental Status Exam:  Appearance: casually dressed,    Behavior/Cooperation: guarded, eye contact normal  Speech: normal tone, normal rate, normal pitch, normal volume  Mood "good"  Affect: Mood congruent, Appropriate  Thought Process: goal-directed  Thought Content: normal, no suicidality, no homicidality, delusions, or paranoia  Orientation: grossly intact  Memory: Grossly intact  Attention Span/Concentration: Normal  Cognition: grossly intact  Insight: limited  Judgment: limited    Laboratory:  Recent Results (from the past 168 hour(s))   CBC auto differential    Collection Time: 07/31/18  8:48 AM   Result Value Ref Range    WBC 4.26 3.90 - 12.70 K/uL    RBC 3.51 (L) 4.60 - 6.20 M/uL    Hemoglobin 10.5 (L) 14.0 - 18.0 g/dL    Hematocrit 32.5 (L) 40.0 - 54.0 %    MCV 93 82 - 98 fL    MCH 29.9 27.0 - 31.0 pg    MCHC 32.3 32.0 - 36.0 g/dL    RDW 14.1 11.5 - 14.5 %    Platelets 44 (L) 150 - 350 K/uL    MPV 10.1 9.2 - 12.9 fL    Immature Granulocytes 0.5 0.0 - 0.5 %    Gran # (ANC) 2.6 1.8 - 7.7 K/uL    Immature Grans (Abs) 0.02 0.00 - 0.04 K/uL    Lymph # 1.0 1.0 - 4.8 K/uL    Mono # 0.6 0.3 - 1.0 K/uL    Eos # 0.1 0.0 - 0.5 K/uL    Baso # 0.02 0.00 - 0.20 K/uL    nRBC 0 0 /100 WBC    Gran% 59.8 38.0 - 73.0 %    Lymph% 23.5 18.0 - 48.0 %    Mono% 14.1 4.0 - 15.0 %    Eosinophil% 1.6 0.0 - 8.0 %    Basophil% 0.5 0.0 - 1.9 %    Differential Method Automated    Comprehensive metabolic panel    Collection Time: 07/31/18  8:48 AM   Result Value Ref Range    Sodium 140 136 - 145 mmol/L    Potassium 4.1 3.5 - 5.1 mmol/L    Chloride 105 95 - 110 mmol/L    CO2 24 23 - 29 mmol/L    Glucose 123 (H) 70 - 110 mg/dL    BUN, Bld 83 (H) 6 - 20 mg/dL    Creatinine 2.3 (H) 0.5 - 1.4 mg/dL    Calcium 9.7 8.7 - 10.5 mg/dL    Total Protein 7.3 6.0 - 8.4 g/dL    Albumin 3.3 (L) 3.5 - 5.2 g/dL    Total Bilirubin 0.7 0.1 - 1.0 mg/dL    Alkaline Phosphatase 168 (H) 55 - 135 U/L    AST 29 10 - 40 U/L    ALT 32 10 - " 44 U/L    Anion Gap 11 8 - 16 mmol/L    eGFR if African American 42.8 (A) >60 mL/min/1.73 m^2    eGFR if non  37.0 (A) >60 mL/min/1.73 m^2   Sirolimus level    Collection Time: 07/31/18  8:48 AM   Result Value Ref Range    Sirolimus Lvl 4.5 4.0 - 20.0 ng/mL   Bilirubin, direct    Collection Time: 07/31/18  8:48 AM   Result Value Ref Range    Bilirubin, Direct 0.4 (H) 0.1 - 0.3 mg/dL   CMV DNA, quantitative, PCR    Collection Time: 07/31/18  8:48 AM   Result Value Ref Range    Cytomegalovirus PCR, Quant Test Not Performed    POCT BREATH ALCOHOL TEST    Collection Time: 07/31/18 11:00 AM   Result Value Ref Range    Breath Alcohol 0.000    Toxicology screen, urine    Collection Time: 08/01/18 11:04 AM   Result Value Ref Range    Alcohol, Urine <10 <10 mg/dL    Benzodiazepines Negative     Methadone metabolites Negative     Cocaine (Metab.) Negative     Opiate Scrn, Ur Negative     Barbiturate Screen, Ur Negative     Amphetamine Screen, Ur Negative     THC Negative     Phencyclidine Negative     Creatinine, Random Ur 34.0 23.0 - 375.0 mg/dL    Toxicology Information SEE COMMENT    POCT BREATH ALCOHOL TEST    Collection Time: 08/01/18 11:05 AM   Result Value Ref Range    Breath Alcohol 0.000    Uric acid    Collection Time: 08/01/18  6:45 PM   Result Value Ref Range    Uric Acid 12.1 (H) 3.4 - 7.0 mg/dL   POCT BREATH ALCOHOL TEST    Collection Time: 08/02/18 11:41 AM   Result Value Ref Range    Breath Alcohol 0.000    Gram stain    Collection Time: 08/02/18 10:33 PM   Result Value Ref Range    Gram Stain Result Rare WBC's     Gram Stain Result No organisms seen    Culture, Anaerobic    Collection Time: 08/02/18 10:33 PM   Result Value Ref Range    Anaerobic Culture Culture in progress    AFB Culture & Smear    Collection Time: 08/02/18 10:33 PM   Result Value Ref Range    AFB Culture & Smear Culture in progress     AFB CULTURE STAIN No acid fast bacilli seen.    Aerobic culture    Collection Time:  08/02/18 10:33 PM   Result Value Ref Range    Aerobic Bacterial Culture No significant isolate    Body fluid crystal    Collection Time: 08/02/18 10:33 PM   Result Value Ref Range    Body Fluid Source, Crystal Exam Joint     Body Fluid Crystal Negative Negative   Pathologist Review of Laboratory Test    Collection Time: 08/02/18 10:33 PM   Result Value Ref Range    Pathologist Review, Body Fluid REVIEWED    Toxicology screen, urine    Collection Time: 08/03/18 10:24 AM   Result Value Ref Range    Alcohol, Urine <10 <10 mg/dL    Benzodiazepines Negative     Methadone metabolites Negative     Cocaine (Metab.) Negative     Opiate Scrn, Ur Negative     Barbiturate Screen, Ur Negative     Amphetamine Screen, Ur Negative     THC Negative     Phencyclidine Negative     Creatinine, Random Ur 81.0 23.0 - 375.0 mg/dL    Toxicology Information SEE COMMENT    POCT BREATH ALCOHOL TEST    Collection Time: 08/03/18 10:31 AM   Result Value Ref Range    Breath Alcohol 0.000    CBC auto differential    Collection Time: 08/06/18  8:55 AM   Result Value Ref Range    WBC 5.03 3.90 - 12.70 K/uL    RBC 3.20 (L) 4.60 - 6.20 M/uL    Hemoglobin 9.8 (L) 14.0 - 18.0 g/dL    Hematocrit 29.4 (L) 40.0 - 54.0 %    MCV 92 82 - 98 fL    MCH 30.6 27.0 - 31.0 pg    MCHC 33.3 32.0 - 36.0 g/dL    RDW 13.6 11.5 - 14.5 %    Platelets 70 (L) 150 - 350 K/uL    MPV 10.8 9.2 - 12.9 fL    Immature Granulocytes 0.4 0.0 - 0.5 %    Gran # (ANC) 3.2 1.8 - 7.7 K/uL    Immature Grans (Abs) 0.02 0.00 - 0.04 K/uL    Lymph # 1.2 1.0 - 4.8 K/uL    Mono # 0.5 0.3 - 1.0 K/uL    Eos # 0.1 0.0 - 0.5 K/uL    Baso # 0.02 0.00 - 0.20 K/uL    nRBC 0 0 /100 WBC    Gran% 63.4 38.0 - 73.0 %    Lymph% 23.9 18.0 - 48.0 %    Mono% 10.5 4.0 - 15.0 %    Eosinophil% 1.4 0.0 - 8.0 %    Basophil% 0.4 0.0 - 1.9 %    Differential Method Automated    Comprehensive metabolic panel    Collection Time: 08/06/18  8:55 AM   Result Value Ref Range    Sodium 140 136 - 145 mmol/L    Potassium 4.1  3.5 - 5.1 mmol/L    Chloride 107 95 - 110 mmol/L    CO2 22 (L) 23 - 29 mmol/L    Glucose 111 (H) 70 - 110 mg/dL    BUN, Bld 85 (H) 6 - 20 mg/dL    Creatinine 2.1 (H) 0.5 - 1.4 mg/dL    Calcium 9.6 8.7 - 10.5 mg/dL    Total Protein 7.3 6.0 - 8.4 g/dL    Albumin 3.4 (L) 3.5 - 5.2 g/dL    Total Bilirubin 0.5 0.1 - 1.0 mg/dL    Alkaline Phosphatase 173 (H) 55 - 135 U/L    AST 21 10 - 40 U/L    ALT 26 10 - 44 U/L    Anion Gap 11 8 - 16 mmol/L    eGFR if African American 47.7 (A) >60 mL/min/1.73 m^2    eGFR if non  41.3 (A) >60 mL/min/1.73 m^2   Sirolimus level    Collection Time: 08/06/18  8:55 AM   Result Value Ref Range    Sirolimus Lvl 4.2 4.0 - 20.0 ng/mL   Bilirubin, direct    Collection Time: 08/06/18  8:55 AM   Result Value Ref Range    Bilirubin, Direct 0.3 0.1 - 0.3 mg/dL   POCT BREATH ALCOHOL TEST    Collection Time: 08/06/18 10:55 AM   Result Value Ref Range    Breath Alcohol 0.000      ASSESSMENT:     Alcohol use disorder, severe, in early remission  Opioid use disorder, mild  Unspecified depression (MDD vs. Adjustment disorder)    Patient low to moderate risk of relapse on opiates, insight and judgement are getting better.  Currently on Suboxone 2/2 mg for opiate agonist treatment, will discontinue today.       PLAN:     · Continue patient on ABU protocol.  · Breathalyzer and urine toxicology daily.  · VS daily x3 days.  · Patient counseled on abstinence from alcohol, opiates (other than Suboxone), and all other drugs of abuse.    Medications: Continue current medications. The risks and benefits of medication were discussed with the patient.  · Decreased Suboxone to 2 mg qAM and 2 mg QHS for opiate use disorder and pain on 8/1, pt states his cravings are under control, would like to discontinue today.    · Remeron was discontinued by Dr. Rogers on 7/23/18.    Status: Continue treatment on ABU    Patient's Intervention Response: guarded    Case discussed with Dr. Lyndsay Stratton,  MD  [facility]  8/6/2018  10:21 AM

## 2018-08-06 NOTE — PROGRESS NOTES
Group Psychotherapy (PhD/LCSW)    Site: [facility]    Clinical status of patient: Intensive Outpatient Program (IOP)    Date: 8/6/2018    Group Focus: Communication Skills    Length of service: 69428 - 45-50 minutes    Number of patients in attendance: 10    Referred by: Addictive Behavior Unit Treatment Team    Target symptoms: Alcohol Abuse    Patient's response to treatment: Active Listening    Progress toward goals: Progressing adequately    Interval History: Group learned about 7 steps to improve communication effectiveness, particularly when making a request of someone, and then utilized examples from group members for practice.    Diagnosis: Alcohol Use Disorder, in early remission    Plan: Continue treatment on ABU

## 2018-08-06 NOTE — PROGRESS NOTES
Group Psychotherapy (PhD/LCSW)    Site: [facility]    Clinical status of patient: Intensive Outpatient Program (IOP)    Date: 8/6/2018    Group Focus: CBT Group Psychotherapy    Length of service: 31234 - 45-50 minutes    Number of patients in attendance: 10    Referred by: Addictive Behavior Unit Treatment Team    Target symptoms: Alcohol Abuse    Patient's response to treatment: Active Listening and Self-disclosure    Progress toward goals: Progressing adequately    Interval History: Session focus was Cognitive Restructuring:  Using the ABCD model to help identify helpful thoughts in situations.  Patients were encouraged to identify antecedents (triggers), beliefs, consequences/feelings, and different (helpful) thoughts.    Diagnosis: alcohol use disorder, severe, in early remission    Plan: Continue treatment on ABU

## 2018-08-07 ENCOUNTER — HOSPITAL ENCOUNTER (OUTPATIENT)
Dept: PSYCHIATRY | Facility: HOSPITAL | Age: 28
Discharge: HOME | End: 2018-08-07
Attending: PSYCHIATRY & NEUROLOGY
Payer: COMMERCIAL

## 2018-08-07 DIAGNOSIS — F10.21 ALCOHOL USE DISORDER, SEVERE, IN EARLY REMISSION: Primary | ICD-10-CM

## 2018-08-07 DIAGNOSIS — R45.89 ANXIETY ABOUT HEALTH: ICD-10-CM

## 2018-08-07 LAB — BREATH ALCOHOL: 0

## 2018-08-07 PROCEDURE — 90853 GROUP PSYCHOTHERAPY: CPT | Mod: NTX

## 2018-08-07 PROCEDURE — 90853 GROUP PSYCHOTHERAPY: CPT | Mod: ,,, | Performed by: PSYCHOLOGIST

## 2018-08-07 PROCEDURE — 90853 GROUP PSYCHOTHERAPY: CPT | Mod: 59,,, | Performed by: PSYCHOLOGIST

## 2018-08-07 NOTE — PROGRESS NOTES
Group Psychotherapy (PhD/LCSW)    Site: [facility]    Clinical status of patient: Intensive Outpatient Program (IOP)    Date: 8/7/2018    Group Focus: The Dynamics of Relationship       Length of service: 92891 - 45-50 minutes    Number of patients in attendance: 12    Referred by: Addictive Behavior Unit Treatment Team    Target symptoms: Alcohol Abuse    Patient's response to treatment: Active Listening      Progress toward goals: Progressing adequately    Interval History: DDiscussed rebuilding trust in situations where there has been a significant breech of trust. Noted the importance of empathy and validation of feelings in such situations. Modeled communication strategies using Reflective Listening and I-messages for addressing trust issues.    Diagnosis: alcohol use disorder, severe, in early remission    Plan: Continue treatment on ABU

## 2018-08-07 NOTE — PROGRESS NOTES
Group Psychotherapy (PhD/LCSW)    Site: [facility]    Clinical status of patient: Intensive Outpatient Program (IOP)    Date: 8/7/2018    Group Focus: Psychodynamic Group Psychotherapy    Length of service: 33566 - 45-50 minutes    Number of patients in attendance: 5    Referred by: Addictive Behavior Unit Treatment Team    Target symptoms: Alcohol Abuse    Patient's response to treatment: Active Listening and Self-disclosure    Progress toward goals: Progressing adequately    Interval History: Discussed shame and guilt.    Diagnosis: alcohol use disorder, severe, in early remission    Plan: Continue treatment on ABU

## 2018-08-07 NOTE — PROGRESS NOTES
Group Psychotherapy (PhD/LCSW)    Site: [facility]    Clinical status of patient: Intensive Outpatient Program (IOP)    Date: 8/7/2018    Group Focus: DBT-Based Group Psychotherapy    Length of service: 66590 - 45-50 minutes    Number of patients in attendance: 12    Referred by: Addictive Behavior Unit Treatment Team    Target symptoms: Alcohol Abuse    Patient's response to treatment: Active Listening and Self-disclosure    Progress toward goals: Progressing adequately    Interval History: Session focus was Emotion Regulation:  Understanding Emotions.  Patients were encouraged to understand what their emotions do for them (motivate them to action, communicate to themselves and others).      Diagnosis: alcohol use disorder, severe, in early remission    Plan: Continue treatment on ABU

## 2018-08-07 NOTE — PROGRESS NOTES
2018 5:32 PM  Name: Jhonny Diana  : 1990  Start Date: 18    [facility] Intensive Outpatient Program   Progress Note    Status: Intensive Outpatient Program (IOP)    HPI:  Jhonny Diana is a 27 y.o. male who presents for admission to [facility] with principal problem of opioid use, as well as hx of alcohol use disorder.      Pt is s/p liver transplant in 10/2017 due to acute alcoholic hepatitis. He has a hx of heavy alcohol use since his teens. He required emergent liver transplantation prior to being able to undergo any type of substance abuse treatment. It was recommended that pt attend the ABU program after he received his liver transplant as he was deemed to be very high risk for relapse post-transplant. However pt never followed up in the ABU due to a number of post-op medical complications including ESRD requiring HD (Mon/Fri). In 2018 the addiction psychiatry team was consulted while pt was admitted to the hospital due to concerns for opioid addiction. Pt was started on Suboxone and again recommended to f/u with the [facility] for substance abuse treatment.      Today pt reports that he is feeling well physically. He confirms hx of alcohol use since the age of 18. He escalated to drinking a fifth of liquor daily in his early to mid twenties. He attempted to cut back a number of times but was unsuccessful. He has attended AA meetings in the past but has never been to a formal rehab program. He minimizes the effects that alcohol had in his life as he states that he mostly drank by himself, so it did not affect his relationships. He does admit that alcohol was a problem for him however, and recognizes the need for continued life long sobriety following his liver transplant.      Regarding his opiate use, pt reports using 10 mg of oxycontin every 4 hours as was prescribed to him. He reports that he began getting prescriptions for opiates a few months prior to his transplant, and continued to use them until this  "month when he was transitioned to Suboxone. He denies ever buying opiates or using meds which were not prescribed to him. He denies ever injecting or snorting them. He does report feeling better both physically and mentally on Suboxone. He reports feeling like his head is clearer and pain is better controlled. He denies any current side effects from Suboxone or cravings to use other opiates.      Last drink/use: Last use of alcohol 9/2017. Last use of narcotic pain medications (other than Suboxone) 6/14/18  Average consumption: drank a fifth of liquor daily. Used Oxycontin 10 mg PO q4h  Withdrawal: yes, hx of tremors and anxiety from alcohol withdrawal. No hx of complicated alcohol withdrawal or opiate withdrawal  Spouse/partner consumption: n/a  Rehab/Detox: no  AA/NA: yes, has been to AA before. Went for approx 1 year prior to his transplant. States "not a fan" due to Sikh aspect    SUBJECTIVE:     Interval Hx:   Upon my evaluation, patient is linear, organized, alert and oriented x 4 with full range of affect. Pt denies any physical symptoms.   Pt states that he had a good night of sleep, his energy level is good, appetite is improving.  Pt is on track to be done with the program on 8/9/18.  Patient is still awaiting to see if he will need a kidney transplant, has an appointment August 14th.     Pt sopped taking his suboxone, believes his abdominal pain is under control.    Medication Side Effects: None  Cravings: Denied cravings for alcohol.  No other acute psychiatric issues reported at this time.    Scheduled Meds:   Current Outpatient Prescriptions on File Prior to Encounter   Medication Sig Dispense Refill    allopurinol (ZYLOPRIM) 100 MG tablet Take 1 tablet (100 mg total) by mouth once daily. 30 tablet 11    amLODIPine (NORVASC) 5 MG tablet Take 1 tablet (5 mg total) by mouth once daily. 30 tablet 11    bisacodyl (DULCOLAX) 5 mg EC tablet Take 2 tablets (10 mg total) by mouth daily as needed for " Constipation. 60 tablet 5    buprenorphine-naloxone 2-0.5 mg (SUBOXONE) 2-0.5 mg Film Take 2 film sublingually in the morning and 1 film sublingually in the evening 90 each 0    doxycycline (VIBRAMYCIN) 100 MG Cap Take 1 capsule (100 mg total) by mouth every 12 (twelve) hours. For 7 days 14 capsule 0    ergocalciferol (VITAMIN D2) 50,000 unit Cap take 1 capsule by mouth every 7 days 4 capsule 2    famotidine (PEPCID) 20 MG tablet Take 1 tablet (20 mg total) by mouth every evening. 30 tablet 11    folic acid (FOLVITE) 1 MG tablet Take 1 tablet (1 mg total) by mouth once daily. 30 tablet 2    levothyroxine (SYNTHROID) 75 MCG tablet Take 1 tablet (75 mcg total) by mouth before breakfast. 30 tablet 11    ondansetron (ZOFRAN-ODT) 8 MG TbDL Take 1 tablet (8 mg total) by mouth every 8 (eight) hours as needed (nausea). 30 tablet 1    sevelamer carbonate (RENVELA) 800 mg Tab Take 2 tablets (1,600 mg total) by mouth 3 (three) times daily with meals. 180 tablet 11    sirolimus (RAPAMUNE) 1 MG Tab Take 1 tablet (1 mg total) by mouth once daily. 30 tablet 11    sirolimus 0.5 mg Tab Take 1 tablet (0.5 mg total) by mouth once daily. Patient to take a total of 1.5mg dose daily ( One 1mg tablet, and one 0.5 tablet) 30 tablet 11    sodium bicarbonate 650 MG tablet Take 1 tablet (650 mg total) by mouth 2 (two) times daily. 60 tablet 11    torsemide (DEMADEX) 20 MG Tab Take 3 tablets (60 mg total) by mouth once daily. 90 tablet 2    ursodiol (ACTIGALL) 300 mg capsule Take 1 capsule (300 mg total) by mouth 2 (two) times daily. 60 capsule 11     No current facility-administered medications on file prior to encounter.      Allergies:  Bactrim [sulfamethoxazole-trimethoprim]    Medical ROS:  See H&P dated 6/29/18 for ROS.     OBJECTIVE:     Vital Signs (Most Recent):  There were no vitals filed for this visit.    Mental Status Exam:  Appearance: casually dressed,    Behavior/Cooperation: guarded, eye contact normal  Speech:  "normal tone, normal rate, normal pitch, normal volume  Mood "good"  Affect: Mood congruent, Appropriate  Thought Process: goal-directed  Thought Content: normal, no suicidality, no homicidality, delusions, or paranoia  Orientation: grossly intact  Memory: Grossly intact  Attention Span/Concentration: Normal  Cognition: grossly intact  Insight: limited  Judgment: limited    Laboratory:  Recent Results (from the past 168 hour(s))   POCT BREATH ALCOHOL TEST    Collection Time: 07/31/18 11:00 AM   Result Value Ref Range    Breath Alcohol 0.000    Toxicology screen, urine    Collection Time: 08/01/18 11:04 AM   Result Value Ref Range    Alcohol, Urine <10 <10 mg/dL    Benzodiazepines Negative     Methadone metabolites Negative     Cocaine (Metab.) Negative     Opiate Scrn, Ur Negative     Barbiturate Screen, Ur Negative     Amphetamine Screen, Ur Negative     THC Negative     Phencyclidine Negative     Creatinine, Random Ur 34.0 23.0 - 375.0 mg/dL    Toxicology Information SEE COMMENT    POCT BREATH ALCOHOL TEST    Collection Time: 08/01/18 11:05 AM   Result Value Ref Range    Breath Alcohol 0.000    Uric acid    Collection Time: 08/01/18  6:45 PM   Result Value Ref Range    Uric Acid 12.1 (H) 3.4 - 7.0 mg/dL   POCT BREATH ALCOHOL TEST    Collection Time: 08/02/18 11:41 AM   Result Value Ref Range    Breath Alcohol 0.000    Gram stain    Collection Time: 08/02/18 10:33 PM   Result Value Ref Range    Gram Stain Result Rare WBC's     Gram Stain Result No organisms seen    Culture, Anaerobic    Collection Time: 08/02/18 10:33 PM   Result Value Ref Range    Anaerobic Culture Culture in progress    AFB Culture & Smear    Collection Time: 08/02/18 10:33 PM   Result Value Ref Range    AFB Culture & Smear Culture in progress     AFB CULTURE STAIN No acid fast bacilli seen.    Aerobic culture    Collection Time: 08/02/18 10:33 PM   Result Value Ref Range    Aerobic Bacterial Culture No significant isolate    Body fluid crystal    " Collection Time: 08/02/18 10:33 PM   Result Value Ref Range    Body Fluid Source, Crystal Exam Joint     Body Fluid Crystal Negative Negative   Pathologist Review of Laboratory Test    Collection Time: 08/02/18 10:33 PM   Result Value Ref Range    Pathologist Review, Body Fluid REVIEWED    Toxicology screen, urine    Collection Time: 08/03/18 10:24 AM   Result Value Ref Range    Alcohol, Urine <10 <10 mg/dL    Benzodiazepines Negative     Methadone metabolites Negative     Cocaine (Metab.) Negative     Opiate Scrn, Ur Negative     Barbiturate Screen, Ur Negative     Amphetamine Screen, Ur Negative     THC Negative     Phencyclidine Negative     Creatinine, Random Ur 81.0 23.0 - 375.0 mg/dL    Toxicology Information SEE COMMENT    POCT BREATH ALCOHOL TEST    Collection Time: 08/03/18 10:31 AM   Result Value Ref Range    Breath Alcohol 0.000    CBC auto differential    Collection Time: 08/06/18  8:55 AM   Result Value Ref Range    WBC 5.03 3.90 - 12.70 K/uL    RBC 3.20 (L) 4.60 - 6.20 M/uL    Hemoglobin 9.8 (L) 14.0 - 18.0 g/dL    Hematocrit 29.4 (L) 40.0 - 54.0 %    MCV 92 82 - 98 fL    MCH 30.6 27.0 - 31.0 pg    MCHC 33.3 32.0 - 36.0 g/dL    RDW 13.6 11.5 - 14.5 %    Platelets 70 (L) 150 - 350 K/uL    MPV 10.8 9.2 - 12.9 fL    Immature Granulocytes 0.4 0.0 - 0.5 %    Gran # (ANC) 3.2 1.8 - 7.7 K/uL    Immature Grans (Abs) 0.02 0.00 - 0.04 K/uL    Lymph # 1.2 1.0 - 4.8 K/uL    Mono # 0.5 0.3 - 1.0 K/uL    Eos # 0.1 0.0 - 0.5 K/uL    Baso # 0.02 0.00 - 0.20 K/uL    nRBC 0 0 /100 WBC    Gran% 63.4 38.0 - 73.0 %    Lymph% 23.9 18.0 - 48.0 %    Mono% 10.5 4.0 - 15.0 %    Eosinophil% 1.4 0.0 - 8.0 %    Basophil% 0.4 0.0 - 1.9 %    Differential Method Automated    Comprehensive metabolic panel    Collection Time: 08/06/18  8:55 AM   Result Value Ref Range    Sodium 140 136 - 145 mmol/L    Potassium 4.1 3.5 - 5.1 mmol/L    Chloride 107 95 - 110 mmol/L    CO2 22 (L) 23 - 29 mmol/L    Glucose 111 (H) 70 - 110 mg/dL    BUN,  Bld 85 (H) 6 - 20 mg/dL    Creatinine 2.1 (H) 0.5 - 1.4 mg/dL    Calcium 9.6 8.7 - 10.5 mg/dL    Total Protein 7.3 6.0 - 8.4 g/dL    Albumin 3.4 (L) 3.5 - 5.2 g/dL    Total Bilirubin 0.5 0.1 - 1.0 mg/dL    Alkaline Phosphatase 173 (H) 55 - 135 U/L    AST 21 10 - 40 U/L    ALT 26 10 - 44 U/L    Anion Gap 11 8 - 16 mmol/L    eGFR if African American 47.7 (A) >60 mL/min/1.73 m^2    eGFR if non  41.3 (A) >60 mL/min/1.73 m^2   Sirolimus level    Collection Time: 08/06/18  8:55 AM   Result Value Ref Range    Sirolimus Lvl 4.2 4.0 - 20.0 ng/mL   Bilirubin, direct    Collection Time: 08/06/18  8:55 AM   Result Value Ref Range    Bilirubin, Direct 0.3 0.1 - 0.3 mg/dL   Toxicology screen, urine    Collection Time: 08/06/18 10:54 AM   Result Value Ref Range    Alcohol, Urine <10 <10 mg/dL    Benzodiazepines Negative     Methadone metabolites Negative     Cocaine (Metab.) Negative     Opiate Scrn, Ur Negative     Barbiturate Screen, Ur Negative     Amphetamine Screen, Ur Negative     THC Negative     Phencyclidine Negative     Creatinine, Random Ur 45.0 23.0 - 375.0 mg/dL    Toxicology Information SEE COMMENT    POCT BREATH ALCOHOL TEST    Collection Time: 08/06/18 10:55 AM   Result Value Ref Range    Breath Alcohol 0.000      ASSESSMENT:     Alcohol use disorder, severe, in early remission  Opioid use disorder, mild  Unspecified depression (MDD vs. Adjustment disorder)    Patient low to moderate risk of relapse on opiates, insight and judgement are getting better.  Was on Suboxone 2/2 mg for opiate agonist treatment for the last week, discontinued on 8/6/18.       PLAN:     · Continue patient on [facility] protocol.  · Breathalyzer and urine toxicology daily.  · VS daily x3 days.  · Patient counseled on abstinence from alcohol, opiates (other than Suboxone), and all other drugs of abuse.    Medications: Continue current medications. The risks and benefits of medication were discussed with the patient.  · Pt stopped  suboxone on 8/6/18.    · Remeron was discontinued by Dr. Rogers on 7/23/18.    Status: Continue treatment on A[facility]    Patient's Intervention Response: guarded    Case discussed with Dr. Lyndsay Stratton MD  [facility]  8/7/2018  10:21 AM

## 2018-08-08 ENCOUNTER — HOSPITAL ENCOUNTER (OUTPATIENT)
Dept: PSYCHIATRY | Facility: HOSPITAL | Age: 28
Discharge: HOME | End: 2018-08-08
Attending: PSYCHIATRY & NEUROLOGY
Payer: COMMERCIAL

## 2018-08-08 ENCOUNTER — TELEPHONE (OUTPATIENT)
Dept: TRANSPLANT | Facility: CLINIC | Age: 28
End: 2018-08-08

## 2018-08-08 ENCOUNTER — PATIENT MESSAGE (OUTPATIENT)
Dept: TRANSPLANT | Facility: CLINIC | Age: 28
End: 2018-08-08

## 2018-08-08 VITALS — RESPIRATION RATE: 16 BRPM | HEART RATE: 88 BPM | DIASTOLIC BLOOD PRESSURE: 75 MMHG | SYSTOLIC BLOOD PRESSURE: 119 MMHG

## 2018-08-08 DIAGNOSIS — F10.21 ALCOHOL USE DISORDER, SEVERE, IN EARLY REMISSION: Primary | ICD-10-CM

## 2018-08-08 DIAGNOSIS — R45.89 ANXIETY ABOUT HEALTH: ICD-10-CM

## 2018-08-08 LAB
AMPHET+METHAMPHET UR QL: NEGATIVE
BARBITURATES UR QL SCN>200 NG/ML: NEGATIVE
BENZODIAZ UR QL SCN>200 NG/ML: NEGATIVE
BREATH ALCOHOL: 0
BZE UR QL SCN: NEGATIVE
CANNABINOIDS UR QL SCN: NEGATIVE
CREAT UR-MCNC: 36 MG/DL
ETHANOL UR-MCNC: <10 MG/DL
METHADONE UR QL SCN>300 NG/ML: NEGATIVE
OPIATES UR QL SCN: NEGATIVE
PCP UR QL SCN>25 NG/ML: NEGATIVE
TOXICOLOGY INFORMATION: NORMAL

## 2018-08-08 PROCEDURE — 90853 GROUP PSYCHOTHERAPY: CPT | Mod: ,,, | Performed by: PSYCHOLOGIST

## 2018-08-08 PROCEDURE — 80307 DRUG TEST PRSMV CHEM ANLYZR: CPT

## 2018-08-08 PROCEDURE — 90853 GROUP PSYCHOTHERAPY: CPT | Mod: NTX

## 2018-08-08 PROCEDURE — 90853 GROUP PSYCHOTHERAPY: CPT | Mod: ,,, | Performed by: PSYCHIATRY & NEUROLOGY

## 2018-08-08 PROCEDURE — 99231 SBSQ HOSP IP/OBS SF/LOW 25: CPT | Mod: ,,, | Performed by: PSYCHIATRY & NEUROLOGY

## 2018-08-08 NOTE — PROGRESS NOTES
Group Psychotherapy (PhD/LCSW)    Site: [facility]    Clinical status of patient: Intensive Outpatient Program (IOP)    Date: 8/8/2018    Group Focus:  Stress Management        Length of service: 77864 - 45-50 minutes    Number of patients in attendance: 9    Referred by: Addictive Behavior Unit Treatment Team    Target symptoms: Alcohol Abuse    Patient's response to treatment: Active Listening     Progress toward goals: Progressing adequately    Interval History: Discussed components of a basic stress management model including the role of triggers, self-care, self-talk, and beliefs (adaptive vs maladaptive). Discussed strategies derived from this model for managing stress and gave examples of how they work together to mitigate stress. .     Diagnosis: alcohol use disorder, severe, in early remission    Plan: Continue treatment on [facility]

## 2018-08-08 NOTE — PATIENT CARE CONFERENCE
[facility]U Staffing:      Alcohol use disorder, severe, in early remission  Opioid use disorder, mild  Unspecified depression (MDD vs. Adjustment disorder)        1. Pt is attending all groups    2. Pt is attending all meetings  3. Pt 's has minimally supportive family  4. Pt has completed spiritual assessment    5. Pt will present life story    6. Pt will present Step One assignment    7. Pt is exploring issues related to relapse  prevention; spirituality; stress management; improved communication skills; assertiveness training; poor self-esteem; disease concepts; cross addictions; and, work related issues    8. D/C date: 8/9     Staff discussed pt's compliance within the program and d/c date of 8/9. Staff discussed pt's completion of 1st step. Staff discussed concern with pt's overall progress in tx related to engagement in recovery and insight. Staff discussed pt not identifying with recovery. Staff discussed medication management related to decreasing suboxone. Staff discussed aftercare plans related to pt following up with Dr. Umana for medical appt and to monitor tapering of suboxone.      Problem: Alcohol use disorder, severe, early remission  Goal: Address in 12 step meetings and group and individual sessions    Objective Measure: participation in groups, self report, length of sobriety, and relapse prevention plan  Time: Prior to discharge    Progress: Pt is attending groups and sessions     Problem: Opioid use disorder, mild  Goal: Address in 12 step meetings and group and individual sessions    Objective Measure: participation in groups, self report, length of sobriety, and relapse prevention plan  Time: Prior to discharge    Progress: Pt is attending groups and sessions    Problem: Unspecified depression (MDD vs. Adjustment disorder)  Goal: Address in 12 step meetings and group and individual sessions    Objective Measure: participation in groups, self report, length of sobriety, and relapse prevention  plan  Time: Prior to discharge    Progress: Pt is attending groups and sessions       Staff members present:     MD Dr. Adrienne Cartagena, PhD  Dr. Nishi MD Resident  [facility] Pollock, [facility]  Vasquez Guillen, [facility]  Lorie Garcia, RN

## 2018-08-08 NOTE — PROGRESS NOTES
Group Psychotherapy (PhD/LCSW)    Site: [facility]    Clinical status of patient: Intensive Outpatient Program (IOP)    Date: 8/7/2018    Group Focus:  Relapse Prevention     Length of service: 18291 - 45-50 minutes    Number of patients in attendance: 5    Referred by: Addictive Behavior Unit Treatment Team    Target symptoms: Alcohol Abuse    Patient's response to treatment: Active Listening and Self-disclosure    Progress toward goals: Progressing adequately    Interval History: Discussed strategies for preventing relapse. Pt noted that in addition to AA pt needed to remember that he is responsible for his happiness and is not responsible for what others think of him.     Diagnosis: alcohol use disorder, severe, in early remission    Plan: Continue treatment on ABU

## 2018-08-08 NOTE — PROGRESS NOTES
"Group Psychotherapy (PhD/LCSW)    Site: [facility]    Clinical status of patient: Intensive Outpatient Program (IOP)    Date: 8/8/2018    Group Focus: Relapse Prevention     Length of service: 21730 - 45-50 minutes    Number of patients in attendance: 5    Referred by: Addictive Behavior Unit Treatment Team    Target symptoms: Alcohol Abuse    Patient's response to treatment: Active Listening and Self-disclosure    Progress toward goals: Progressing adequately    Interval History: Discussed the importance of a sponsor for maintaining recovery and how to go about choosing one. Noted that all sponsors are "temorary". Also addressed the dangers of overconfidence: How it can weaken the commitment to recovery practices and make one vulnerable to relapse.     Diagnosis: alcohol use disorder, severe, in early remission    Plan: Continue treatment on ABU        "

## 2018-08-08 NOTE — PROGRESS NOTES
2018 5:32 PM  Name: Jhonny Diana  : 1990  Start Date: 18    [facility] Intensive Outpatient Program   Progress Note    Status: Intensive Outpatient Program (IOP)    HPI:  Jhonny Diana is a 27 y.o. male who presents for admission to [facility] with principal problem of opioid use, as well as hx of alcohol use disorder.      Pt is s/p liver transplant in 10/2017 due to acute alcoholic hepatitis. He has a hx of heavy alcohol use since his teens. He required emergent liver transplantation prior to being able to undergo any type of substance abuse treatment. It was recommended that pt attend the [facility] program after he received his liver transplant as he was deemed to be very high risk for relapse post-transplant. However pt never followed up in the [facility] due to a number of post-op medical complications including ESRD requiring HD (Mon/Fri). In 2018 the addiction psychiatry team was consulted while pt was admitted to the hospital due to concerns for opioid addiction. Pt was started on Suboxone and again recommended to f/u with the [facility] for substance abuse treatment.      Today pt reports that he is feeling well physically. He confirms hx of alcohol use since the age of 18. He escalated to drinking a fifth of liquor daily in his early to mid twenties. He attempted to cut back a number of times but was unsuccessful. He has attended AA meetings in the past but has never been to a formal rehab program. He minimizes the effects that alcohol had in his life as he states that he mostly drank by himself, so it did not affect his relationships. He does admit that alcohol was a problem for him however, and recognizes the need for continued life long sobriety following his liver transplant.      Regarding his opiate use, pt reports using 10 mg of oxycontin every 4 hours as was prescribed to him. He reports that he began getting prescriptions for opiates a few months prior to his transplant, and continued to use them until this  "month when he was transitioned to Suboxone. He denies ever buying opiates or using meds which were not prescribed to him. He denies ever injecting or snorting them. He does report feeling better both physically and mentally on Suboxone. He reports feeling like his head is clearer and pain is better controlled. He denies any current side effects from Suboxone or cravings to use other opiates.      Last drink/use: Last use of alcohol 9/2017. Last use of narcotic pain medications (other than Suboxone) 6/14/18  Average consumption: drank a fifth of liquor daily. Used Oxycontin 10 mg PO q4h  Withdrawal: yes, hx of tremors and anxiety from alcohol withdrawal. No hx of complicated alcohol withdrawal or opiate withdrawal  Spouse/partner consumption: n/a  Rehab/Detox: no  AA/NA: yes, has been to AA before. Went for approx 1 year prior to his transplant. States "not a fan" due to Christian aspect    SUBJECTIVE:     Interval Hx:   Upon my evaluation, patient is linear, organized, alert and oriented x 4 with full range of affect. Pt denies any physical symptoms.   Pt states that he had a good night of sleep, his energy level is good, appetite is improving.  Pt is on track to be done with the program on 8/9/18.  Patient is still waiting to see if he will need a kidney transplant, has an appointment August 14th, his Cr has decreased to almost the normal range, his BUN is still elevated.     Pt sopped taking his suboxone, believes his abdominal pain is under control.    Medication Side Effects: None  Cravings: Denied cravings for alcohol.  No other acute psychiatric issues reported at this time.    Scheduled Meds:   Current Outpatient Prescriptions on File Prior to Encounter   Medication Sig Dispense Refill    allopurinol (ZYLOPRIM) 100 MG tablet Take 1 tablet (100 mg total) by mouth once daily. 30 tablet 11    amLODIPine (NORVASC) 5 MG tablet Take 1 tablet (5 mg total) by mouth once daily. 30 tablet 11    bisacodyl (DULCOLAX) " 5 mg EC tablet Take 2 tablets (10 mg total) by mouth daily as needed for Constipation. 60 tablet 5    buprenorphine-naloxone 2-0.5 mg (SUBOXONE) 2-0.5 mg Film Take 2 film sublingually in the morning and 1 film sublingually in the evening 90 each 0    doxycycline (VIBRAMYCIN) 100 MG Cap Take 1 capsule (100 mg total) by mouth every 12 (twelve) hours. For 7 days 14 capsule 0    ergocalciferol (VITAMIN D2) 50,000 unit Cap take 1 capsule by mouth every 7 days 4 capsule 2    famotidine (PEPCID) 20 MG tablet Take 1 tablet (20 mg total) by mouth every evening. 30 tablet 11    folic acid (FOLVITE) 1 MG tablet Take 1 tablet (1 mg total) by mouth once daily. 30 tablet 2    levothyroxine (SYNTHROID) 75 MCG tablet Take 1 tablet (75 mcg total) by mouth before breakfast. 30 tablet 11    ondansetron (ZOFRAN-ODT) 8 MG TbDL Take 1 tablet (8 mg total) by mouth every 8 (eight) hours as needed (nausea). 30 tablet 1    sevelamer carbonate (RENVELA) 800 mg Tab Take 2 tablets (1,600 mg total) by mouth 3 (three) times daily with meals. 180 tablet 11    sirolimus (RAPAMUNE) 1 MG Tab Take 1 tablet (1 mg total) by mouth once daily. 30 tablet 11    sirolimus 0.5 mg Tab Take 1 tablet (0.5 mg total) by mouth once daily. Patient to take a total of 1.5mg dose daily ( One 1mg tablet, and one 0.5 tablet) 30 tablet 11    sodium bicarbonate 650 MG tablet Take 1 tablet (650 mg total) by mouth 2 (two) times daily. 60 tablet 11    torsemide (DEMADEX) 20 MG Tab Take 3 tablets (60 mg total) by mouth once daily. 90 tablet 2    ursodiol (ACTIGALL) 300 mg capsule Take 1 capsule (300 mg total) by mouth 2 (two) times daily. 60 capsule 11     No current facility-administered medications on file prior to encounter.      Allergies:  Bactrim [sulfamethoxazole-trimethoprim]    Medical ROS:  See H&P dated 6/29/18 for ROS.     OBJECTIVE:     Vital Signs (Most Recent):  There were no vitals filed for this visit.    Mental Status Exam:  Appearance: casually  "dressed,    Behavior/Cooperation: guarded, eye contact normal  Speech: normal tone, normal rate, normal pitch, normal volume  Mood "good"  Affect: Mood congruent, Appropriate  Thought Process: goal-directed  Thought Content: normal, no suicidality, no homicidality, delusions, or paranoia  Orientation: grossly intact  Memory: Grossly intact  Attention Span/Concentration: Normal  Cognition: grossly intact  Insight: limited  Judgment: limited    Laboratory:  Recent Results (from the past 168 hour(s))   Toxicology screen, urine    Collection Time: 08/01/18 11:04 AM   Result Value Ref Range    Alcohol, Urine <10 <10 mg/dL    Benzodiazepines Negative     Methadone metabolites Negative     Cocaine (Metab.) Negative     Opiate Scrn, Ur Negative     Barbiturate Screen, Ur Negative     Amphetamine Screen, Ur Negative     THC Negative     Phencyclidine Negative     Creatinine, Random Ur 34.0 23.0 - 375.0 mg/dL    Toxicology Information SEE COMMENT    POCT BREATH ALCOHOL TEST    Collection Time: 08/01/18 11:05 AM   Result Value Ref Range    Breath Alcohol 0.000    Uric acid    Collection Time: 08/01/18  6:45 PM   Result Value Ref Range    Uric Acid 12.1 (H) 3.4 - 7.0 mg/dL   POCT BREATH ALCOHOL TEST    Collection Time: 08/02/18 11:41 AM   Result Value Ref Range    Breath Alcohol 0.000    Gram stain    Collection Time: 08/02/18 10:33 PM   Result Value Ref Range    Gram Stain Result Rare WBC's     Gram Stain Result No organisms seen    Fungus culture    Collection Time: 08/02/18 10:33 PM   Result Value Ref Range    Fungus (Mycology) Culture Culture in progress    Culture, Anaerobic    Collection Time: 08/02/18 10:33 PM   Result Value Ref Range    Anaerobic Culture Culture in progress    AFB Culture & Smear    Collection Time: 08/02/18 10:33 PM   Result Value Ref Range    AFB Culture & Smear Culture in progress     AFB CULTURE STAIN No acid fast bacilli seen.    Aerobic culture    Collection Time: 08/02/18 10:33 PM   Result Value " Ref Range    Aerobic Bacterial Culture No significant isolate    Body fluid crystal    Collection Time: 08/02/18 10:33 PM   Result Value Ref Range    Body Fluid Source, Crystal Exam Joint     Body Fluid Crystal Negative Negative   Pathologist Review of Laboratory Test    Collection Time: 08/02/18 10:33 PM   Result Value Ref Range    Pathologist Review, Body Fluid REVIEWED    Toxicology screen, urine    Collection Time: 08/03/18 10:24 AM   Result Value Ref Range    Alcohol, Urine <10 <10 mg/dL    Benzodiazepines Negative     Methadone metabolites Negative     Cocaine (Metab.) Negative     Opiate Scrn, Ur Negative     Barbiturate Screen, Ur Negative     Amphetamine Screen, Ur Negative     THC Negative     Phencyclidine Negative     Creatinine, Random Ur 81.0 23.0 - 375.0 mg/dL    Toxicology Information SEE COMMENT    POCT BREATH ALCOHOL TEST    Collection Time: 08/03/18 10:31 AM   Result Value Ref Range    Breath Alcohol 0.000    CBC auto differential    Collection Time: 08/06/18  8:55 AM   Result Value Ref Range    WBC 5.03 3.90 - 12.70 K/uL    RBC 3.20 (L) 4.60 - 6.20 M/uL    Hemoglobin 9.8 (L) 14.0 - 18.0 g/dL    Hematocrit 29.4 (L) 40.0 - 54.0 %    MCV 92 82 - 98 fL    MCH 30.6 27.0 - 31.0 pg    MCHC 33.3 32.0 - 36.0 g/dL    RDW 13.6 11.5 - 14.5 %    Platelets 70 (L) 150 - 350 K/uL    MPV 10.8 9.2 - 12.9 fL    Immature Granulocytes 0.4 0.0 - 0.5 %    Gran # (ANC) 3.2 1.8 - 7.7 K/uL    Immature Grans (Abs) 0.02 0.00 - 0.04 K/uL    Lymph # 1.2 1.0 - 4.8 K/uL    Mono # 0.5 0.3 - 1.0 K/uL    Eos # 0.1 0.0 - 0.5 K/uL    Baso # 0.02 0.00 - 0.20 K/uL    nRBC 0 0 /100 WBC    Gran% 63.4 38.0 - 73.0 %    Lymph% 23.9 18.0 - 48.0 %    Mono% 10.5 4.0 - 15.0 %    Eosinophil% 1.4 0.0 - 8.0 %    Basophil% 0.4 0.0 - 1.9 %    Differential Method Automated    Comprehensive metabolic panel    Collection Time: 08/06/18  8:55 AM   Result Value Ref Range    Sodium 140 136 - 145 mmol/L    Potassium 4.1 3.5 - 5.1 mmol/L    Chloride 107 95  - 110 mmol/L    CO2 22 (L) 23 - 29 mmol/L    Glucose 111 (H) 70 - 110 mg/dL    BUN, Bld 85 (H) 6 - 20 mg/dL    Creatinine 2.1 (H) 0.5 - 1.4 mg/dL    Calcium 9.6 8.7 - 10.5 mg/dL    Total Protein 7.3 6.0 - 8.4 g/dL    Albumin 3.4 (L) 3.5 - 5.2 g/dL    Total Bilirubin 0.5 0.1 - 1.0 mg/dL    Alkaline Phosphatase 173 (H) 55 - 135 U/L    AST 21 10 - 40 U/L    ALT 26 10 - 44 U/L    Anion Gap 11 8 - 16 mmol/L    eGFR if African American 47.7 (A) >60 mL/min/1.73 m^2    eGFR if non  41.3 (A) >60 mL/min/1.73 m^2   Sirolimus level    Collection Time: 08/06/18  8:55 AM   Result Value Ref Range    Sirolimus Lvl 4.2 4.0 - 20.0 ng/mL   Bilirubin, direct    Collection Time: 08/06/18  8:55 AM   Result Value Ref Range    Bilirubin, Direct 0.3 0.1 - 0.3 mg/dL   Toxicology screen, urine    Collection Time: 08/06/18 10:54 AM   Result Value Ref Range    Alcohol, Urine <10 <10 mg/dL    Benzodiazepines Negative     Methadone metabolites Negative     Cocaine (Metab.) Negative     Opiate Scrn, Ur Negative     Barbiturate Screen, Ur Negative     Amphetamine Screen, Ur Negative     THC Negative     Phencyclidine Negative     Creatinine, Random Ur 45.0 23.0 - 375.0 mg/dL    Toxicology Information SEE COMMENT    POCT BREATH ALCOHOL TEST    Collection Time: 08/06/18 10:55 AM   Result Value Ref Range    Breath Alcohol 0.000    POCT BREATH ALCOHOL TEST    Collection Time: 08/07/18 11:46 AM   Result Value Ref Range    Breath Alcohol 0.000      ASSESSMENT:     Alcohol use disorder, severe, in early remission  Opioid use disorder, mild  Unspecified depression (MDD vs. Adjustment disorder)    Patient low to moderate risk of relapse on opiates, insight and judgement are getting better.  Was on Suboxone 2/2 mg for opiate agonist treatment for the last week, discontinued on 8/6/18.       PLAN:     · Continue patient on [facility] protocol.  · Breathalyzer and urine toxicology daily.  · VS daily x3 days.  · Patient counseled on abstinence from  alcohol, opiates (other than Suboxone), and all other drugs of abuse.    Medications: Continue current medications. The risks and benefits of medication were discussed with the patient.  · Pt stopped suboxone on 8/6/18.    · Remeron was discontinued by Dr. Rogers on 7/23/18.    Status: Continue treatment on [facility]    Patient's Intervention Response: guarded    Case discussed with Dr. Lyndsay Stratton MD  [facility]  8/8/2018  10:21 AM

## 2018-08-08 NOTE — PROGRESS NOTES
Group Psychotherapy (PhD/LCSW)    Site: [facility]    Clinical status of patient: Intensive Outpatient Program (IOP)    Date: 8/8/2018    Group Focus: Psychodynamic Group Psychotherapy    Length of service: 29698 - 45-50 minutes    Number of patients in attendance: 5    Referred by: Addictive Behavior Unit Treatment Team    Target symptoms: Alcohol Abuse    Patient's response to treatment: Active Listening and Self-disclosure    Progress toward goals: Progressing slowly    Interval History: Shared his story with new group member.    Diagnosis: alcohol use disorder, severe, in early remission    Plan: Continue treatment on [facility]

## 2018-08-08 NOTE — TELEPHONE ENCOUNTER
----- Message from Georgina Rogers MD sent at 8/6/2018  1:05 PM CDT -----  Labs stable, no change in medication.

## 2018-08-09 ENCOUNTER — HOSPITAL ENCOUNTER (OUTPATIENT)
Dept: PSYCHIATRY | Facility: HOSPITAL | Age: 28
Discharge: HOME | End: 2018-08-09
Attending: PSYCHIATRY & NEUROLOGY
Payer: COMMERCIAL

## 2018-08-09 ENCOUNTER — TELEPHONE (OUTPATIENT)
Dept: HEPATOLOGY | Facility: CLINIC | Age: 28
End: 2018-08-09

## 2018-08-09 DIAGNOSIS — F10.21 ALCOHOL USE DISORDER, SEVERE, IN EARLY REMISSION: Primary | ICD-10-CM

## 2018-08-09 DIAGNOSIS — R45.89 ANXIETY ABOUT HEALTH: ICD-10-CM

## 2018-08-09 LAB — BREATH ALCOHOL: 0

## 2018-08-09 PROCEDURE — 90853 GROUP PSYCHOTHERAPY: CPT | Mod: ,,, | Performed by: PSYCHOLOGIST

## 2018-08-09 PROCEDURE — 90853 GROUP PSYCHOTHERAPY: CPT | Mod: NTX

## 2018-08-09 PROCEDURE — 90853 GROUP PSYCHOTHERAPY: CPT | Performed by: SOCIAL WORKER

## 2018-08-09 PROCEDURE — 99231 SBSQ HOSP IP/OBS SF/LOW 25: CPT | Mod: ,,, | Performed by: PSYCHIATRY & NEUROLOGY

## 2018-08-09 NOTE — TELEPHONE ENCOUNTER
----- Message from Cathleen Henry sent at 8/9/2018 12:33 PM CDT -----  Contact: Pt   Pt would like to be called back regarding rescheduling labs to 8/13/18.     Pt can be reached at [phone].

## 2018-08-09 NOTE — PLAN OF CARE
08/09/18 1400   Activity/Group Therapy Checklist   Group Relapse Prevention  (Step Work )   Attendance Attended   Follows Direction Followed directions   Group Interactions/Observations Interacted appropriately   Affect/Mood Range Normal range   Affect/Mood Display Appropriate   Goal Progression Progressing

## 2018-08-09 NOTE — PROGRESS NOTES
Group Psychotherapy (PhD/LCSW)    Site: [facility]    Clinical status of patient: Intensive Outpatient Program (IOP)    Date: 8/9/2018    Group Focus: Psychodynamic Group Psychotherapy    Length of service: 85420 - 45-50 minutes    Number of patients in attendance: 5    Referred by: Addictive Behavior Unit Treatment Team    Target symptoms: Alcohol Abuse    Patient's response to treatment: Active Listening and Self-disclosure    Progress toward goals: Progressing slowly    Interval History: Shared his story with new group member.    Diagnosis: alcohol use disorder, severe, in early remission    Plan: Continue treatment on [facility]

## 2018-08-09 NOTE — MEDICAL/APP STUDENT
.  Name: Jhonny Diana  : 1990  MRN: [id number]  DOA: 18  DOD: 18  Attending physician:  Blank Umana MD  Resident: Dr. Stratton    [facility]  Discharge Summary    Diagnosis:  Patient Active Problem List   Diagnosis    S/P liver transplant    Prophylactic immunotherapy    Long-term use of immunosuppressant medication    At risk for opportunistic infections    Ascites due to alcoholic cirrhosis    Biliary stricture of transplanted liver    Hypothyroidism    Vitamin D deficiency    Alcohol use disorder, severe, in early remission    Anemia of chronic renal failure    Chronic kidney disease-mineral and bone disorder    Other pancytopenia    Thrombocytopenia    Abdominal pain    Anxiety about health    Opioid dependence    Mild protein-calorie malnutrition    Cellulitis, leg    Left knee pain       Program course/treatments:  Patient started IOP in the [facility] on 18. Patient had been on 10mg oxycontin q4 and was transitioned to suboxone in July at 8mg 4 in the morning and two at night. We tapered the dosage down and his last dosage was in 18. He tolerated the medication taper well without any side effects or withdrawal. Patient was breathalyzed and gave a urine toxicology daily. Patient attended AA meetings daily during his/her time in the program  He/She completed the program without relapse and in good standing. He/She plans to continue attending AA meetings in [place], [place] and plans to get a sponsor in that area.    Labs:  Recent Results (from the past 840 hour(s))   POCT BREATH ALCOHOL TEST    Collection Time: 18 11:20 AM   Result Value Ref Range    Breath Alcohol 0.000    Toxicology screen, urine    Collection Time: 18 10:51 AM   Result Value Ref Range    Alcohol, Urine <10 <10 mg/dL    Benzodiazepines Negative     Methadone metabolites Negative     Cocaine (Metab.) Negative     Opiate Scrn, Ur Negative      Barbiturate Screen, Ur Negative     Amphetamine Screen, Ur Negative     THC Negative     Phencyclidine Negative     Creatinine, Random Ur 19.0 (L) 23.0 - 375.0 mg/dL    Toxicology Information SEE COMMENT    POCT BREATH ALCOHOL TEST    Collection Time: 07/06/18 10:52 AM   Result Value Ref Range    Breath Alcohol 0.000    CBC auto differential    Collection Time: 07/09/18  9:40 AM   Result Value Ref Range    WBC 5.96 3.90 - 12.70 K/uL    RBC 3.11 (L) 4.60 - 6.20 M/uL    Hemoglobin 9.7 (L) 14.0 - 18.0 g/dL    Hematocrit 31.0 (L) 40.0 - 54.0 %     (H) 82 - 98 fL    MCH 31.2 (H) 27.0 - 31.0 pg    MCHC 31.3 (L) 32.0 - 36.0 g/dL    RDW 14.5 11.5 - 14.5 %    Platelets 36 (LL) 150 - 350 K/uL    MPV 11.9 9.2 - 12.9 fL    Immature Granulocytes 1.7 (H) 0.0 - 0.5 %    Gran # (ANC) 4.2 1.8 - 7.7 K/uL    Immature Grans (Abs) 0.10 (H) 0.00 - 0.04 K/uL    Lymph # 1.0 1.0 - 4.8 K/uL    Mono # 0.7 0.3 - 1.0 K/uL    Eos # 0.0 0.0 - 0.5 K/uL    Baso # 0.03 0.00 - 0.20 K/uL    nRBC 0 0 /100 WBC    Gran% 69.9 38.0 - 73.0 %    Lymph% 16.3 (L) 18.0 - 48.0 %    Mono% 11.1 4.0 - 15.0 %    Eosinophil% 0.5 0.0 - 8.0 %    Basophil% 0.5 0.0 - 1.9 %    Platelet Estimate Decreased (A)     Differential Method Automated    Comprehensive metabolic panel    Collection Time: 07/09/18  9:40 AM   Result Value Ref Range    Sodium 140 136 - 145 mmol/L    Potassium 4.0 3.5 - 5.1 mmol/L    Chloride 106 95 - 110 mmol/L    CO2 23 23 - 29 mmol/L    Glucose 78 70 - 110 mg/dL    BUN, Bld 76 (H) 6 - 20 mg/dL    Creatinine 2.8 (H) 0.5 - 1.4 mg/dL    Calcium 9.5 8.7 - 10.5 mg/dL    Total Protein 6.7 6.0 - 8.4 g/dL    Albumin 3.1 (L) 3.5 - 5.2 g/dL    Total Bilirubin 0.6 0.1 - 1.0 mg/dL    Alkaline Phosphatase 173 (H) 55 - 135 U/L    AST 28 10 - 40 U/L    ALT 32 10 - 44 U/L    Anion Gap 11 8 - 16 mmol/L    eGFR if African American 33.9 (A) >60 mL/min/1.73 m^2    eGFR if non  29.4 (A) >60 mL/min/1.73 m^2   Sirolimus level    Collection Time:  07/09/18  9:40 AM   Result Value Ref Range    Sirolimus Lvl 2.6 (L) 4.0 - 20.0 ng/mL   Bilirubin, direct    Collection Time: 07/09/18  9:40 AM   Result Value Ref Range    Bilirubin, Direct 0.4 (H) 0.1 - 0.3 mg/dL   Toxicology screen, urine    Collection Time: 07/09/18 11:58 AM   Result Value Ref Range    Alcohol, Urine <10 <10 mg/dL    Benzodiazepines Negative     Methadone metabolites Negative     Cocaine (Metab.) Negative     Opiate Scrn, Ur Negative     Barbiturate Screen, Ur Negative     Amphetamine Screen, Ur Negative     THC Negative     Phencyclidine Negative     Creatinine, Random Ur 53.0 23.0 - 375.0 mg/dL    Toxicology Information SEE COMMENT    POCT BREATH ALCOHOL TEST    Collection Time: 07/09/18 12:05 PM   Result Value Ref Range    Breath Alcohol 0.000    POCT BREATH ALCOHOL TEST    Collection Time: 07/10/18 10:39 AM   Result Value Ref Range    Breath Alcohol 0.000    Toxicology screen, urine    Collection Time: 07/11/18 11:55 AM   Result Value Ref Range    Alcohol, Urine <10 <10 mg/dL    Benzodiazepines Negative     Methadone metabolites Negative     Cocaine (Metab.) Negative     Opiate Scrn, Ur Negative     Barbiturate Screen, Ur Negative     Amphetamine Screen, Ur Negative     THC Negative     Phencyclidine Negative     Creatinine, Random Ur 49.0 23.0 - 375.0 mg/dL    Toxicology Information SEE COMMENT    POCT BREATH ALCOHOL TEST    Collection Time: 07/11/18 11:55 AM   Result Value Ref Range    Breath Alcohol 0.000    CBC auto differential    Collection Time: 07/11/18 12:29 PM   Result Value Ref Range    WBC 5.87 3.90 - 12.70 K/uL    RBC 3.42 (L) 4.60 - 6.20 M/uL    Hemoglobin 10.9 (L) 14.0 - 18.0 g/dL    Hematocrit 33.6 (L) 40.0 - 54.0 %    MCV 98 82 - 98 fL    MCH 31.9 (H) 27.0 - 31.0 pg    MCHC 32.4 32.0 - 36.0 g/dL    RDW 14.7 (H) 11.5 - 14.5 %    Platelets 46 (L) 150 - 350 K/uL    MPV 11.8 9.2 - 12.9 fL    Immature Granulocytes CANCELED 0.0 - 0.5 %    Immature Grans (Abs) CANCELED 0.00 - 0.04  K/uL    nRBC 0 0 /100 WBC    Gran% 81.0 (H) 38.0 - 73.0 %    Lymph% 14.0 (L) 18.0 - 48.0 %    Mono% 1.0 (L) 4.0 - 15.0 %    Eosinophil% 0.0 0.0 - 8.0 %    Basophil% 0.0 0.0 - 1.9 %    Bands 4.0 %    Platelet Estimate Decreased (A)     Aniso Slight     Poik Slight     Poly Occasional     Hypo Occasional     Ovalocytes Occasional     Differential Method Manual    Comprehensive metabolic panel    Collection Time: 07/11/18 12:29 PM   Result Value Ref Range    Sodium 141 136 - 145 mmol/L    Potassium 4.2 3.5 - 5.1 mmol/L    Chloride 106 95 - 110 mmol/L    CO2 21 (L) 23 - 29 mmol/L    Glucose 112 (H) 70 - 110 mg/dL    BUN, Bld 65 (H) 6 - 20 mg/dL    Creatinine 2.7 (H) 0.5 - 1.4 mg/dL    Calcium 9.1 8.7 - 10.5 mg/dL    Total Protein 7.7 6.0 - 8.4 g/dL    Albumin 3.5 3.5 - 5.2 g/dL    Total Bilirubin 0.7 0.1 - 1.0 mg/dL    Alkaline Phosphatase 194 (H) 55 - 135 U/L    AST 34 10 - 40 U/L    ALT 38 10 - 44 U/L    Anion Gap 14 8 - 16 mmol/L    eGFR if African American 35.5 (A) >60 mL/min/1.73 m^2    eGFR if non  30.7 (A) >60 mL/min/1.73 m^2   Troponin I #1    Collection Time: 07/11/18 12:29 PM   Result Value Ref Range    Troponin I 0.010 0.000 - 0.026 ng/mL   B-Type natriuretic peptide (BNP)    Collection Time: 07/11/18 12:29 PM   Result Value Ref Range    BNP 91 0 - 99 pg/mL   Type & Screen    Collection Time: 07/11/18 12:29 PM   Result Value Ref Range    Group & Rh O POS     Indirect Claudia NEG    Phosphorus    Collection Time: 07/11/18 12:29 PM   Result Value Ref Range    Phosphorus 4.3 2.7 - 4.5 mg/dL   Magnesium    Collection Time: 07/11/18 12:29 PM   Result Value Ref Range    Magnesium 2.0 1.6 - 2.6 mg/dL   Lactic acid, plasma    Collection Time: 07/11/18 12:53 PM   Result Value Ref Range    Lactate (Lactic Acid) 1.5 0.5 - 2.2 mmol/L   Blood culture #1 **CANNOT BE ORDERED STAT**    Collection Time: 07/11/18 12:53 PM   Result Value Ref Range    Blood Culture, Routine No growth after 5 days.    Urinalysis,  Reflex to Urine Culture Urine, Clean Catch    Collection Time: 07/11/18  1:27 PM   Result Value Ref Range    Specimen UA Urine, Clean Catch     Color, UA Straw Yellow, Straw, Martha    Appearance, UA Clear Clear    pH, UA 7.0 5.0 - 8.0    Specific Gravity, UA 1.005 1.005 - 1.030    Protein, UA Negative Negative    Glucose, UA Negative Negative    Ketones, UA Negative Negative    Bilirubin (UA) Negative Negative    Occult Blood UA Negative Negative    Nitrite, UA Negative Negative    Urobilinogen, UA Negative <2.0 EU/dL    Leukocytes, UA Negative Negative   POCT BREATH ALCOHOL TEST    Collection Time: 07/12/18 11:30 AM   Result Value Ref Range    Breath Alcohol 0.000    Toxicology screen, urine    Collection Time: 07/13/18 10:38 AM   Result Value Ref Range    Alcohol, Urine <10 <10 mg/dL    Benzodiazepines Negative     Methadone metabolites Negative     Cocaine (Metab.) Negative     Opiate Scrn, Ur Negative     Barbiturate Screen, Ur Negative     Amphetamine Screen, Ur Negative     THC Negative     Phencyclidine Negative     Creatinine, Random Ur 50.0 23.0 - 375.0 mg/dL    Toxicology Information SEE COMMENT    POCT BREATH ALCOHOL TEST    Collection Time: 07/13/18 10:39 AM   Result Value Ref Range    Breath Alcohol 0.000    Sirolimus level    Collection Time: 07/16/18  9:34 AM   Result Value Ref Range    Sirolimus Lvl 4.2 4.0 - 20.0 ng/mL   CBC auto differential    Collection Time: 07/16/18  9:34 AM   Result Value Ref Range    WBC 4.84 3.90 - 12.70 K/uL    RBC 3.25 (L) 4.60 - 6.20 M/uL    Hemoglobin 10.2 (L) 14.0 - 18.0 g/dL    Hematocrit 32.2 (L) 40.0 - 54.0 %    MCV 99 (H) 82 - 98 fL    MCH 31.4 (H) 27.0 - 31.0 pg    MCHC 31.7 (L) 32.0 - 36.0 g/dL    RDW 15.4 (H) 11.5 - 14.5 %    Platelets 46 (L) 150 - 350 K/uL    MPV 11.7 9.2 - 12.9 fL    Immature Granulocytes 2.9 (H) 0.0 - 0.5 %    Gran # (ANC) 3.0 1.8 - 7.7 K/uL    Immature Grans (Abs) 0.14 (H) 0.00 - 0.04 K/uL    Lymph # 1.1 1.0 - 4.8 K/uL    Mono # 0.6 0.3 - 1.0  K/uL    Eos # 0.1 0.0 - 0.5 K/uL    Baso # 0.02 0.00 - 0.20 K/uL    nRBC 0 0 /100 WBC    Gran% 62.0 38.0 - 73.0 %    Lymph% 22.3 18.0 - 48.0 %    Mono% 11.4 4.0 - 15.0 %    Eosinophil% 1.0 0.0 - 8.0 %    Basophil% 0.4 0.0 - 1.9 %    Differential Method Automated    Comprehensive metabolic panel    Collection Time: 07/16/18  9:34 AM   Result Value Ref Range    Sodium 141 136 - 145 mmol/L    Potassium 4.0 3.5 - 5.1 mmol/L    Chloride 105 95 - 110 mmol/L    CO2 22 (L) 23 - 29 mmol/L    Glucose 106 70 - 110 mg/dL    BUN, Bld 70 (H) 6 - 20 mg/dL    Creatinine 2.7 (H) 0.5 - 1.4 mg/dL    Calcium 9.1 8.7 - 10.5 mg/dL    Total Protein 6.7 6.0 - 8.4 g/dL    Albumin 3.0 (L) 3.5 - 5.2 g/dL    Total Bilirubin 0.7 0.1 - 1.0 mg/dL    Alkaline Phosphatase 177 (H) 55 - 135 U/L    AST 28 10 - 40 U/L    ALT 30 10 - 44 U/L    Anion Gap 14 8 - 16 mmol/L    eGFR if African American 35.5 (A) >60 mL/min/1.73 m^2    eGFR if non  30.7 (A) >60 mL/min/1.73 m^2   Toxicology screen, urine    Collection Time: 07/16/18 11:13 AM   Result Value Ref Range    Alcohol, Urine <10 <10 mg/dL    Benzodiazepines Negative     Methadone metabolites Negative     Cocaine (Metab.) Negative     Opiate Scrn, Ur Negative     Barbiturate Screen, Ur Negative     Amphetamine Screen, Ur Negative     THC Negative     Phencyclidine Negative     Creatinine, Random Ur 35.0 23.0 - 375.0 mg/dL    Toxicology Information SEE COMMENT    POCT BREATH ALCOHOL TEST    Collection Time: 07/16/18 11:13 AM   Result Value Ref Range    Breath Alcohol 0.000    POCT BREATH ALCOHOL TEST    Collection Time: 07/17/18 12:33 PM   Result Value Ref Range    Breath Alcohol 0.000    Toxicology screen, urine    Collection Time: 07/18/18 11:02 AM   Result Value Ref Range    Alcohol, Urine <10 <10 mg/dL    Benzodiazepines Negative     Methadone metabolites Negative     Cocaine (Metab.) Negative     Opiate Scrn, Ur Negative     Barbiturate Screen, Ur Negative     Amphetamine Screen, Ur  Negative     THC Negative     Phencyclidine Negative     Creatinine, Random Ur 57.0 23.0 - 375.0 mg/dL    Toxicology Information SEE COMMENT    POCT BREATH ALCOHOL TEST    Collection Time: 07/18/18 11:03 AM   Result Value Ref Range    Breath Alcohol 0.000    POCT BREATH ALCOHOL TEST    Collection Time: 07/19/18 11:54 AM   Result Value Ref Range    Breath Alcohol 0.000    Toxicology screen, urine    Collection Time: 07/20/18 10:41 AM   Result Value Ref Range    Alcohol, Urine <10 <10 mg/dL    Benzodiazepines Negative     Methadone metabolites Negative     Cocaine (Metab.) Negative     Opiate Scrn, Ur Negative     Barbiturate Screen, Ur Negative     Amphetamine Screen, Ur Negative     THC Negative     Phencyclidine Negative     Creatinine, Random Ur 50.0 23.0 - 375.0 mg/dL    Toxicology Information SEE COMMENT    POCT BREATH ALCOHOL TEST    Collection Time: 07/20/18 10:43 AM   Result Value Ref Range    Breath Alcohol 0.000    Renal function panel    Collection Time: 07/23/18  7:32 AM   Result Value Ref Range    Glucose 119 (H) 70 - 110 mg/dL    Sodium 141 136 - 145 mmol/L    Potassium 4.1 3.5 - 5.1 mmol/L    Chloride 106 95 - 110 mmol/L    CO2 22 (L) 23 - 29 mmol/L    BUN, Bld 78 (H) 6 - 20 mg/dL    Calcium 9.4 8.7 - 10.5 mg/dL    Creatinine 2.6 (H) 0.5 - 1.4 mg/dL    Albumin 3.2 (L) 3.5 - 5.2 g/dL    Phosphorus 5.4 (H) 2.7 - 4.5 mg/dL    eGFR if  36.9 (A) >60 mL/min/1.73 m^2    eGFR if non  31.9 (A) >60 mL/min/1.73 m^2    Anion Gap 13 8 - 16 mmol/L   POCT BREATH ALCOHOL TEST    Collection Time: 07/23/18 10:55 AM   Result Value Ref Range    Breath Alcohol 0.000    Bilirubin, direct    Collection Time: 07/24/18  7:55 AM   Result Value Ref Range    Bilirubin, Direct 0.3 0.1 - 0.3 mg/dL   Sirolimus level    Collection Time: 07/24/18  7:55 AM   Result Value Ref Range    Sirolimus Lvl 5.6 4.0 - 20.0 ng/mL   CBC auto differential    Collection Time: 07/24/18  7:55 AM   Result Value Ref Range     WBC 4.87 3.90 - 12.70 K/uL    RBC 3.63 (L) 4.60 - 6.20 M/uL    Hemoglobin 11.2 (L) 14.0 - 18.0 g/dL    Hematocrit 35.1 (L) 40.0 - 54.0 %    MCV 97 82 - 98 fL    MCH 30.9 27.0 - 31.0 pg    MCHC 31.9 (L) 32.0 - 36.0 g/dL    RDW 14.4 11.5 - 14.5 %    Platelets 56 (L) 150 - 350 K/uL    MPV 11.5 9.2 - 12.9 fL    Immature Granulocytes 1.0 (H) 0.0 - 0.5 %    Gran # (ANC) 3.2 1.8 - 7.7 K/uL    Immature Grans (Abs) 0.05 (H) 0.00 - 0.04 K/uL    Lymph # 1.1 1.0 - 4.8 K/uL    Mono # 0.5 0.3 - 1.0 K/uL    Eos # 0.0 0.0 - 0.5 K/uL    Baso # 0.03 0.00 - 0.20 K/uL    nRBC 0 0 /100 WBC    Gran% 66.2 38.0 - 73.0 %    Lymph% 22.2 18.0 - 48.0 %    Mono% 9.4 4.0 - 15.0 %    Eosinophil% 0.6 0.0 - 8.0 %    Basophil% 0.6 0.0 - 1.9 %    Differential Method Automated    Comprehensive metabolic panel    Collection Time: 07/24/18  7:55 AM   Result Value Ref Range    Sodium 140 136 - 145 mmol/L    Potassium 3.7 3.5 - 5.1 mmol/L    Chloride 107 95 - 110 mmol/L    CO2 20 (L) 23 - 29 mmol/L    Glucose 123 (H) 70 - 110 mg/dL    BUN, Bld 80 (H) 6 - 20 mg/dL    Creatinine 2.6 (H) 0.5 - 1.4 mg/dL    Calcium 9.6 8.7 - 10.5 mg/dL    Total Protein 7.4 6.0 - 8.4 g/dL    Albumin 3.4 (L) 3.5 - 5.2 g/dL    Total Bilirubin 0.6 0.1 - 1.0 mg/dL    Alkaline Phosphatase 175 (H) 55 - 135 U/L    AST 24 10 - 40 U/L    ALT 26 10 - 44 U/L    Anion Gap 13 8 - 16 mmol/L    eGFR if African American 36.9 (A) >60 mL/min/1.73 m^2    eGFR if non  31.9 (A) >60 mL/min/1.73 m^2   POCT BREATH ALCOHOL TEST    Collection Time: 07/24/18 11:52 AM   Result Value Ref Range    Breath Alcohol 0.000    Toxicology screen, urine    Collection Time: 07/25/18 10:10 AM   Result Value Ref Range    Alcohol, Urine <10 <10 mg/dL    Benzodiazepines Negative     Methadone metabolites Negative     Cocaine (Metab.) Negative     Opiate Scrn, Ur Negative     Barbiturate Screen, Ur Negative     Amphetamine Screen, Ur Negative     THC Negative     Phencyclidine Negative     Creatinine,  Random Ur 28.0 23.0 - 375.0 mg/dL    Toxicology Information SEE COMMENT    POCT BREATH ALCOHOL TEST    Collection Time: 07/25/18 10:11 AM   Result Value Ref Range    Breath Alcohol 0.000    POCT BREATH ALCOHOL TEST    Collection Time: 07/26/18 11:32 AM   Result Value Ref Range    Breath Alcohol 0.000    AFP tumor marker    Collection Time: 07/27/18 10:37 AM   Result Value Ref Range    AFP 7.3 0.0 - 8.4 ng/mL   Protime-INR    Collection Time: 07/27/18 10:37 AM   Result Value Ref Range    Prothrombin Time 9.9 9.0 - 12.5 sec    INR 0.9 0.8 - 1.2   Sirolimus level    Collection Time: 07/27/18 10:37 AM   Result Value Ref Range    Sirolimus Lvl 3.3 (L) 4.0 - 20.0 ng/mL   Bilirubin, direct    Collection Time: 07/27/18 10:37 AM   Result Value Ref Range    Bilirubin, Direct 0.3 0.1 - 0.3 mg/dL   Rapid BMT CBC with Diff    Collection Time: 07/27/18 10:37 AM   Result Value Ref Range    WBC 6.13 3.90 - 12.70 K/uL    RBC 3.42 (L) 4.60 - 6.20 M/uL    Hemoglobin 10.8 (L) 14.0 - 18.0 g/dL    Hematocrit 31.9 (L) 40.0 - 54.0 %    MCV 93 82 - 98 fL    MCH 31.6 (H) 27.0 - 31.0 pg    MCHC 33.9 32.0 - 36.0 g/dL    RDW 14.2 11.5 - 14.5 %    Platelets 60 (L) 150 - 350 K/uL    MPV 10.5 9.2 - 12.9 fL    Immature Granulocytes 0.5 0.0 - 0.5 %    Gran # (ANC) 4.0 1.8 - 7.7 K/uL    Immature Grans (Abs) 0.03 0.00 - 0.04 K/uL    Lymph # 1.2 1.0 - 4.8 K/uL    Mono # 0.8 0.3 - 1.0 K/uL    Eos # 0.1 0.0 - 0.5 K/uL    Baso # 0.02 0.00 - 0.20 K/uL    nRBC 0 0 /100 WBC    Gran% 65.5 38.0 - 73.0 %    Lymph% 20.2 18.0 - 48.0 %    Mono% 12.2 4.0 - 15.0 %    Eosinophil% 1.3 0.0 - 8.0 %    Basophil% 0.3 0.0 - 1.9 %    Differential Method Automated    Comprehensive metabolic panel    Collection Time: 07/27/18 10:37 AM   Result Value Ref Range    Sodium 140 136 - 145 mmol/L    Potassium 4.0 3.5 - 5.1 mmol/L    Chloride 106 95 - 110 mmol/L    CO2 20 (L) 23 - 29 mmol/L    Glucose 118 (H) 70 - 110 mg/dL    BUN, Bld 80 (H) 6 - 20 mg/dL    Creatinine 2.6 (H) 0.5 -  1.4 mg/dL    Calcium 9.7 8.7 - 10.5 mg/dL    Total Protein 7.4 6.0 - 8.4 g/dL    Albumin 3.5 3.5 - 5.2 g/dL    Total Bilirubin 0.6 0.1 - 1.0 mg/dL    Alkaline Phosphatase 165 (H) 55 - 135 U/L    AST 26 10 - 40 U/L    ALT 30 10 - 44 U/L    Anion Gap 14 8 - 16 mmol/L    eGFR if African American 36.9 (A) >60 mL/min/1.73 m^2    eGFR if non  31.9 (A) >60 mL/min/1.73 m^2   Toxicology screen, urine    Collection Time: 07/27/18 11:24 AM   Result Value Ref Range    Alcohol, Urine <10 <10 mg/dL    Benzodiazepines Negative     Methadone metabolites Negative     Cocaine (Metab.) Negative     Opiate Scrn, Ur Negative     Barbiturate Screen, Ur Negative     Amphetamine Screen, Ur Negative     THC Negative     Phencyclidine Negative     Creatinine, Random Ur 51.0 23.0 - 375.0 mg/dL    Toxicology Information SEE COMMENT    POCT BREATH ALCOHOL TEST    Collection Time: 07/27/18 11:25 AM   Result Value Ref Range    Breath Alcohol 0.000    Toxicology screen, urine    Collection Time: 07/30/18 10:51 AM   Result Value Ref Range    Alcohol, Urine <10 <10 mg/dL    Benzodiazepines Negative     Methadone metabolites Negative     Cocaine (Metab.) Negative     Opiate Scrn, Ur Negative     Barbiturate Screen, Ur Negative     Amphetamine Screen, Ur Negative     THC Negative     Phencyclidine Negative     Creatinine, Random Ur 37.0 23.0 - 375.0 mg/dL    Toxicology Information SEE COMMENT    POCT BREATH ALCOHOL TEST    Collection Time: 07/30/18 10:51 AM   Result Value Ref Range    Breath Alcohol 0.000    CBC auto differential    Collection Time: 07/31/18  8:48 AM   Result Value Ref Range    WBC 4.26 3.90 - 12.70 K/uL    RBC 3.51 (L) 4.60 - 6.20 M/uL    Hemoglobin 10.5 (L) 14.0 - 18.0 g/dL    Hematocrit 32.5 (L) 40.0 - 54.0 %    MCV 93 82 - 98 fL    MCH 29.9 27.0 - 31.0 pg    MCHC 32.3 32.0 - 36.0 g/dL    RDW 14.1 11.5 - 14.5 %    Platelets 44 (L) 150 - 350 K/uL    MPV 10.1 9.2 - 12.9 fL    Immature Granulocytes 0.5 0.0 - 0.5 %     Gran # (ANC) 2.6 1.8 - 7.7 K/uL    Immature Grans (Abs) 0.02 0.00 - 0.04 K/uL    Lymph # 1.0 1.0 - 4.8 K/uL    Mono # 0.6 0.3 - 1.0 K/uL    Eos # 0.1 0.0 - 0.5 K/uL    Baso # 0.02 0.00 - 0.20 K/uL    nRBC 0 0 /100 WBC    Gran% 59.8 38.0 - 73.0 %    Lymph% 23.5 18.0 - 48.0 %    Mono% 14.1 4.0 - 15.0 %    Eosinophil% 1.6 0.0 - 8.0 %    Basophil% 0.5 0.0 - 1.9 %    Differential Method Automated    Comprehensive metabolic panel    Collection Time: 07/31/18  8:48 AM   Result Value Ref Range    Sodium 140 136 - 145 mmol/L    Potassium 4.1 3.5 - 5.1 mmol/L    Chloride 105 95 - 110 mmol/L    CO2 24 23 - 29 mmol/L    Glucose 123 (H) 70 - 110 mg/dL    BUN, Bld 83 (H) 6 - 20 mg/dL    Creatinine 2.3 (H) 0.5 - 1.4 mg/dL    Calcium 9.7 8.7 - 10.5 mg/dL    Total Protein 7.3 6.0 - 8.4 g/dL    Albumin 3.3 (L) 3.5 - 5.2 g/dL    Total Bilirubin 0.7 0.1 - 1.0 mg/dL    Alkaline Phosphatase 168 (H) 55 - 135 U/L    AST 29 10 - 40 U/L    ALT 32 10 - 44 U/L    Anion Gap 11 8 - 16 mmol/L    eGFR if African American 42.8 (A) >60 mL/min/1.73 m^2    eGFR if non  37.0 (A) >60 mL/min/1.73 m^2   Sirolimus level    Collection Time: 07/31/18  8:48 AM   Result Value Ref Range    Sirolimus Lvl 4.5 4.0 - 20.0 ng/mL   Bilirubin, direct    Collection Time: 07/31/18  8:48 AM   Result Value Ref Range    Bilirubin, Direct 0.4 (H) 0.1 - 0.3 mg/dL   CMV DNA, quantitative, PCR    Collection Time: 07/31/18  8:48 AM   Result Value Ref Range    Cytomegalovirus PCR, Quant Test Not Performed    POCT BREATH ALCOHOL TEST    Collection Time: 07/31/18 11:00 AM   Result Value Ref Range    Breath Alcohol 0.000    Toxicology screen, urine    Collection Time: 08/01/18 11:04 AM   Result Value Ref Range    Alcohol, Urine <10 <10 mg/dL    Benzodiazepines Negative     Methadone metabolites Negative     Cocaine (Metab.) Negative     Opiate Scrn, Ur Negative     Barbiturate Screen, Ur Negative     Amphetamine Screen, Ur Negative     THC Negative     Phencyclidine  Negative     Creatinine, Random Ur 34.0 23.0 - 375.0 mg/dL    Toxicology Information SEE COMMENT    POCT BREATH ALCOHOL TEST    Collection Time: 08/01/18 11:05 AM   Result Value Ref Range    Breath Alcohol 0.000    Uric acid    Collection Time: 08/01/18  6:45 PM   Result Value Ref Range    Uric Acid 12.1 (H) 3.4 - 7.0 mg/dL   POCT BREATH ALCOHOL TEST    Collection Time: 08/02/18 11:41 AM   Result Value Ref Range    Breath Alcohol 0.000    Gram stain    Collection Time: 08/02/18 10:33 PM   Result Value Ref Range    Gram Stain Result Rare WBC's     Gram Stain Result No organisms seen    Fungus culture    Collection Time: 08/02/18 10:33 PM   Result Value Ref Range    Fungus (Mycology) Culture Culture in progress    Culture, Anaerobic    Collection Time: 08/02/18 10:33 PM   Result Value Ref Range    Anaerobic Culture Culture in progress    AFB Culture & Smear    Collection Time: 08/02/18 10:33 PM   Result Value Ref Range    AFB Culture & Smear Culture in progress     AFB CULTURE STAIN No acid fast bacilli seen.    Aerobic culture    Collection Time: 08/02/18 10:33 PM   Result Value Ref Range    Aerobic Bacterial Culture No significant isolate    Body fluid crystal    Collection Time: 08/02/18 10:33 PM   Result Value Ref Range    Body Fluid Source, Crystal Exam Joint     Body Fluid Crystal Negative Negative   Pathologist Review of Laboratory Test    Collection Time: 08/02/18 10:33 PM   Result Value Ref Range    Pathologist Review, Body Fluid REVIEWED    Toxicology screen, urine    Collection Time: 08/03/18 10:24 AM   Result Value Ref Range    Alcohol, Urine <10 <10 mg/dL    Benzodiazepines Negative     Methadone metabolites Negative     Cocaine (Metab.) Negative     Opiate Scrn, Ur Negative     Barbiturate Screen, Ur Negative     Amphetamine Screen, Ur Negative     THC Negative     Phencyclidine Negative     Creatinine, Random Ur 81.0 23.0 - 375.0 mg/dL    Toxicology Information SEE COMMENT    POCT BREATH ALCOHOL TEST     Collection Time: 08/03/18 10:31 AM   Result Value Ref Range    Breath Alcohol 0.000    CBC auto differential    Collection Time: 08/06/18  8:55 AM   Result Value Ref Range    WBC 5.03 3.90 - 12.70 K/uL    RBC 3.20 (L) 4.60 - 6.20 M/uL    Hemoglobin 9.8 (L) 14.0 - 18.0 g/dL    Hematocrit 29.4 (L) 40.0 - 54.0 %    MCV 92 82 - 98 fL    MCH 30.6 27.0 - 31.0 pg    MCHC 33.3 32.0 - 36.0 g/dL    RDW 13.6 11.5 - 14.5 %    Platelets 70 (L) 150 - 350 K/uL    MPV 10.8 9.2 - 12.9 fL    Immature Granulocytes 0.4 0.0 - 0.5 %    Gran # (ANC) 3.2 1.8 - 7.7 K/uL    Immature Grans (Abs) 0.02 0.00 - 0.04 K/uL    Lymph # 1.2 1.0 - 4.8 K/uL    Mono # 0.5 0.3 - 1.0 K/uL    Eos # 0.1 0.0 - 0.5 K/uL    Baso # 0.02 0.00 - 0.20 K/uL    nRBC 0 0 /100 WBC    Gran% 63.4 38.0 - 73.0 %    Lymph% 23.9 18.0 - 48.0 %    Mono% 10.5 4.0 - 15.0 %    Eosinophil% 1.4 0.0 - 8.0 %    Basophil% 0.4 0.0 - 1.9 %    Differential Method Automated    Comprehensive metabolic panel    Collection Time: 08/06/18  8:55 AM   Result Value Ref Range    Sodium 140 136 - 145 mmol/L    Potassium 4.1 3.5 - 5.1 mmol/L    Chloride 107 95 - 110 mmol/L    CO2 22 (L) 23 - 29 mmol/L    Glucose 111 (H) 70 - 110 mg/dL    BUN, Bld 85 (H) 6 - 20 mg/dL    Creatinine 2.1 (H) 0.5 - 1.4 mg/dL    Calcium 9.6 8.7 - 10.5 mg/dL    Total Protein 7.3 6.0 - 8.4 g/dL    Albumin 3.4 (L) 3.5 - 5.2 g/dL    Total Bilirubin 0.5 0.1 - 1.0 mg/dL    Alkaline Phosphatase 173 (H) 55 - 135 U/L    AST 21 10 - 40 U/L    ALT 26 10 - 44 U/L    Anion Gap 11 8 - 16 mmol/L    eGFR if African American 47.7 (A) >60 mL/min/1.73 m^2    eGFR if non  41.3 (A) >60 mL/min/1.73 m^2   Sirolimus level    Collection Time: 08/06/18  8:55 AM   Result Value Ref Range    Sirolimus Lvl 4.2 4.0 - 20.0 ng/mL   Bilirubin, direct    Collection Time: 08/06/18  8:55 AM   Result Value Ref Range    Bilirubin, Direct 0.3 0.1 - 0.3 mg/dL   Toxicology screen, urine    Collection Time: 08/06/18 10:54 AM   Result Value Ref  Range    Alcohol, Urine <10 <10 mg/dL    Benzodiazepines Negative     Methadone metabolites Negative     Cocaine (Metab.) Negative     Opiate Scrn, Ur Negative     Barbiturate Screen, Ur Negative     Amphetamine Screen, Ur Negative     THC Negative     Phencyclidine Negative     Creatinine, Random Ur 45.0 23.0 - 375.0 mg/dL    Toxicology Information SEE COMMENT    POCT BREATH ALCOHOL TEST    Collection Time: 08/06/18 10:55 AM   Result Value Ref Range    Breath Alcohol 0.000    POCT BREATH ALCOHOL TEST    Collection Time: 08/07/18 11:46 AM   Result Value Ref Range    Breath Alcohol 0.000    Toxicology screen, urine    Collection Time: 08/08/18 11:08 AM   Result Value Ref Range    Alcohol, Urine <10 <10 mg/dL    Benzodiazepines Negative     Methadone metabolites Negative     Cocaine (Metab.) Negative     Opiate Scrn, Ur Negative     Barbiturate Screen, Ur Negative     Amphetamine Screen, Ur Negative     THC Negative     Phencyclidine Negative     Creatinine, Random Ur 36.0 23.0 - 375.0 mg/dL    Toxicology Information SEE COMMENT    POCT BREATH ALCOHOL TEST    Collection Time: 08/08/18 11:09 AM   Result Value Ref Range    Breath Alcohol 0.000        Vitals:  There were no vitals filed for this visit.    Mental Status Exam:  Appearance: unremarkable, age appropriate  Behavior/Cooperation: normal, cooperative  Speech: normal tone, normal rate, normal pitch, normal volume  Mood: fine  Affect: normal  Thought Process: normal and logical  Thought Content: normal, no suicidality, no homicidality, delusions, or paranoia  Orientation: grossly intact  Memory: Grossly intact  Attention Span/Concentration: Normal  Cognition: grossly intact  Insight: fair  Judgment: good    Discharge Instructions:    Diet:  No restrictions    Activity:  activity as tolerated    Medications:  Current Outpatient Prescriptions on File Prior to Encounter   Medication Sig Dispense Refill    allopurinol (ZYLOPRIM) 100 MG tablet Take 1 tablet (100 mg  total) by mouth once daily. 30 tablet 11    amLODIPine (NORVASC) 5 MG tablet Take 1 tablet (5 mg total) by mouth once daily. 30 tablet 11    bisacodyl (DULCOLAX) 5 mg EC tablet Take 2 tablets (10 mg total) by mouth daily as needed for Constipation. 60 tablet 5    buprenorphine-naloxone 2-0.5 mg (SUBOXONE) 2-0.5 mg Film Take 2 film sublingually in the morning and 1 film sublingually in the evening 90 each 0    doxycycline (VIBRAMYCIN) 100 MG Cap Take 1 capsule (100 mg total) by mouth every 12 (twelve) hours. For 7 days 14 capsule 0    ergocalciferol (VITAMIN D2) 50,000 unit Cap take 1 capsule by mouth every 7 days 4 capsule 2    famotidine (PEPCID) 20 MG tablet Take 1 tablet (20 mg total) by mouth every evening. 30 tablet 11    folic acid (FOLVITE) 1 MG tablet Take 1 tablet (1 mg total) by mouth once daily. 30 tablet 2    levothyroxine (SYNTHROID) 75 MCG tablet Take 1 tablet (75 mcg total) by mouth before breakfast. 30 tablet 11    ondansetron (ZOFRAN-ODT) 8 MG TbDL Take 1 tablet (8 mg total) by mouth every 8 (eight) hours as needed (nausea). 30 tablet 1    sevelamer carbonate (RENVELA) 800 mg Tab Take 2 tablets (1,600 mg total) by mouth 3 (three) times daily with meals. 180 tablet 11    sirolimus (RAPAMUNE) 1 MG Tab Take 1 tablet (1 mg total) by mouth once daily. 30 tablet 11    sirolimus 0.5 mg Tab Take 1 tablet (0.5 mg total) by mouth once daily. Patient to take a total of 1.5mg dose daily ( One 1mg tablet, and one 0.5 tablet) 30 tablet 11    sodium bicarbonate 650 MG tablet Take 1 tablet (650 mg total) by mouth 2 (two) times daily. 60 tablet 11    torsemide (DEMADEX) 20 MG Tab Take 3 tablets (60 mg total) by mouth once daily. 90 tablet 2    ursodiol (ACTIGALL) 300 mg capsule Take 1 capsule (300 mg total) by mouth 2 (two) times daily. 60 capsule 11     No current facility-administered medications on file prior to encounter.      Discharge instructions:    · Take all medications as  prescribed  · Follow up with outpatient mental health provider as discussed with treatment team  · Talk to your provider about any concerns or side effects with your medications  · Avoid all drugs and alcohol

## 2018-08-09 NOTE — DISCHARGE SUMMARY
Name: Jhonny Diana  : 1990  MRN: [id number]  DOA: 18  DOD: 18  Attending physician: Dr. Umana  Resident: Dr. Stratton         [facility]  Discharge Summary  Diagnosis:  Patient Active Problem List   Diagnosis    S/P liver transplant    Prophylactic immunotherapy    Long-term use of immunosuppressant medication    At risk for opportunistic infections    Ascites due to alcoholic cirrhosis    Biliary stricture of transplanted liver    Hypothyroidism    Vitamin D deficiency    Alcohol use disorder, severe, in early remission    Anemia of chronic renal failure    Chronic kidney disease-mineral and bone disorder    Other pancytopenia    Thrombocytopenia    Abdominal pain    Anxiety about health    Opioid dependence    Mild protein-calorie malnutrition    Cellulitis, leg    Left knee pain       Program course/treatments:  Patient started IOP in the [facility] on 18. Patient had been on 10mg oxycontin q4 and was transitioned to suboxone in July at 8mg 4 in the morning and two at night. We tapered the dosage down and his last dosage was in 18. He tolerated the medication taper well without any side effects or withdrawal. Patient was breathalyzed and gave a urine toxicology daily. Patient attended AA meetings daily during his/her time in the program  He/She completed the program without relapse and in good standing. He/She plans to continue attending AA meetings in [place], [place] and plans to get a sponsor in that area.     Labs:  Results for orders placed or performed during the hospital encounter of 18   Toxicology screen, urine   Result Value Ref Range    Alcohol, Urine <10 <10 mg/dL    Benzodiazepines Negative     Methadone metabolites Negative     Cocaine (Metab.) Negative     Opiate Scrn, Ur Negative     Barbiturate Screen, Ur Negative     Amphetamine Screen, Ur Negative     THC Negative     Phencyclidine Negative      Creatinine, Random Ur 36.0 23.0 - 375.0 mg/dL    Toxicology Information SEE COMMENT    POCT BREATH ALCOHOL TEST   Result Value Ref Range    Breath Alcohol 0.000         Vitals:  There were no vitals filed for this visit.    Discharge Exam:  Mental Status Exam:  Appearance: unremarkable, age appropriate  Behavior/Cooperation: normal, cooperative  Speech: normal tone, normal rate, normal pitch, normal volume  Mood: fine  Affect: normal  Thought Process: normal and logical  Thought Content: normal, no suicidality, no homicidality, delusions, or paranoia  Orientation: grossly intact  Memory: Grossly intact  Attention Span/Concentration: Normal  Cognition: grossly intact  Insight: fair  Judgment: good    Discharge Instructions:  Diet:  No restrictions  Activity:  activity as tolerated      Medications:  Current Outpatient Prescriptions on File Prior to Encounter   Medication Sig Dispense Refill    allopurinol (ZYLOPRIM) 100 MG tablet Take 1 tablet (100 mg total) by mouth once daily. 30 tablet 11    amLODIPine (NORVASC) 5 MG tablet Take 1 tablet (5 mg total) by mouth once daily. 30 tablet 11    bisacodyl (DULCOLAX) 5 mg EC tablet Take 2 tablets (10 mg total) by mouth daily as needed for Constipation. 60 tablet 5    buprenorphine-naloxone 2-0.5 mg (SUBOXONE) 2-0.5 mg Film Take 2 film sublingually in the morning and 1 film sublingually in the evening 90 each 0    doxycycline (VIBRAMYCIN) 100 MG Cap Take 1 capsule (100 mg total) by mouth every 12 (twelve) hours. For 7 days 14 capsule 0    ergocalciferol (VITAMIN D2) 50,000 unit Cap take 1 capsule by mouth every 7 days 4 capsule 2    famotidine (PEPCID) 20 MG tablet Take 1 tablet (20 mg total) by mouth every evening. 30 tablet 11    folic acid (FOLVITE) 1 MG tablet Take 1 tablet (1 mg total) by mouth once daily. 30 tablet 2    levothyroxine (SYNTHROID) 75 MCG tablet Take 1 tablet (75 mcg total) by mouth before breakfast. 30 tablet 11    ondansetron (ZOFRAN-ODT) 8 MG  TbDL Take 1 tablet (8 mg total) by mouth every 8 (eight) hours as needed (nausea). 30 tablet 1    sevelamer carbonate (RENVELA) 800 mg Tab Take 2 tablets (1,600 mg total) by mouth 3 (three) times daily with meals. 180 tablet 11    sirolimus (RAPAMUNE) 1 MG Tab Take 1 tablet (1 mg total) by mouth once daily. 30 tablet 11    sirolimus 0.5 mg Tab Take 1 tablet (0.5 mg total) by mouth once daily. Patient to take a total of 1.5mg dose daily ( One 1mg tablet, and one 0.5 tablet) 30 tablet 11    sodium bicarbonate 650 MG tablet Take 1 tablet (650 mg total) by mouth 2 (two) times daily. 60 tablet 11    torsemide (DEMADEX) 20 MG Tab Take 3 tablets (60 mg total) by mouth once daily. 90 tablet 2    ursodiol (ACTIGALL) 300 mg capsule Take 1 capsule (300 mg total) by mouth 2 (two) times daily. 60 capsule 11             -Take all medications as prescribed  - Follow up with outpatient mental health provider as discussed with treatment team  - Talk to your provider about any concerns or side effects with your medications  - Avoid all drugs and alcohol     Thai Stratton MD  [facility]  8/9/2018  10:13 AM    Attending Attestation:    I have independently evaluated the patient and discussed the case with the resident. I have reviewed this note, edited it where appropriate, and agree with its current contents, including the assessment and plan.     Patient fully complied with treatment program requirements. However some concern remains about his level of insight into the severity of his issues, with minimization of his past alcohol problems and other substance abuse and his inability or unwillingness to discuss psychic struggles or risks. Will repeat PEth testing at future visits. Recommend follow up with outpatient counseling and AA in [place]. Pt tapered self off suboxone more rapidly than recommended, stopping at 2 mg bid. Is at risk for withdrawal sx,counseled patient about that today.Pt insisted on remaining  off, but agreed to call me if having any withdrawal, at which point further taper can be performed.   Blank Umana MD

## 2018-08-09 NOTE — PROGRESS NOTES
"Group Psychotherapy (PhD/LCSW)    Site: [facility]    Clinical status of patient: Intensive Outpatient Program (IOP)    Date: 8/9/2018    Group Focus: Strength Training      Length of service: 12063 - 45-50 minutes    Number of patients in attendance: 5    Referred by: [facility] Treatment Team    Target symptoms: Alcohol Abuse    Patient's response to treatment: Active Listening and Self-disclosure    Progress toward goals: Progressing slowly    Interval History: Discussed the concept of the "Negativity Bias of the Brain" and simple strategies for counteracting same based on the concept of neuroplasticity.    Diagnosis: alcohol use disorder, severe, in early remission    Plan: Continue treatment on [facility]        "

## 2018-08-09 NOTE — PROGRESS NOTES
Group Psychotherapy (PhD/LCSW)    Site: [facility]    Clinical status of patient: Intensive Outpatient Program (IOP)    Date: 8/9/2018    Group Focus: DBT-Based Group Psychotherapy    Length of service: 04624 - 45-50 minutes    Number of patients in attendance: 10    Referred by: [facility] Treatment Team    Target symptoms: Alcohol Abuse    Patient's response to treatment: Active Listening and Self-disclosure    Progress toward goals: Progressing adequately    Interval History: Session focus was Emotion Regulation:  Opposite Action.  Patients identified action urges for each emotion and learned how to engage in opposite action when the emotions are not justified or unhelpful.    Diagnosis: alcohol use disorder, severe, in early remission    Plan: Continue treatment on [facility]

## 2018-08-10 LAB — BACTERIA SPEC ANAEROBE CULT: NORMAL

## 2018-08-11 NOTE — PROGRESS NOTES
"Group Psychotherapy (MD)     Site: [facility]     Clinical status of patient: Intensive Outpatient Program (IOP)     Date: 8/8/18     Group Focus: Medical and Neuropsychiatric Bases of Psychiatric Disease and Addiction: anxiety     Length of service: 52710 - 45-50 minutes     Number of patients in attendance: 10     Referred by: [facility] Treatment Team     Target symptoms: Anxiety     Patient's response to treatment: Active Listening       Interval history: Discussed psychiatric symptoms of anxiety and neurochemical basis of anxiety, relating it to addiction and other psychiatric disorders. Provided rationale for use of various anxiety treatments, introduced concept of avoidance and discussed ways to gradually expose self to sources of anxiety. Explored responses to anxiety and helped patient separte those responses into either 'self care" or "self medication".       Progress towards goals: progressing adequately       Diagnosis: Alcohol Use Disorder     Plan: Continue treatment on [facility]     "

## 2018-08-13 ENCOUNTER — LAB VISIT (OUTPATIENT)
Dept: LAB | Facility: HOSPITAL | Age: 28
End: 2018-08-13
Attending: INTERNAL MEDICINE
Payer: COMMERCIAL

## 2018-08-13 DIAGNOSIS — Z94.4 LIVER TRANSPLANTED: ICD-10-CM

## 2018-08-13 LAB
ALBUMIN SERPL BCP-MCNC: 3.5 G/DL
ALP SERPL-CCNC: 175 U/L
ALT SERPL W/O P-5'-P-CCNC: 31 U/L
ANION GAP SERPL CALC-SCNC: 11 MMOL/L
AST SERPL-CCNC: 39 U/L
BASOPHILS # BLD AUTO: 0.04 K/UL
BASOPHILS NFR BLD: 0.9 %
BILIRUB DIRECT SERPL-MCNC: 0.4 MG/DL
BILIRUB SERPL-MCNC: 0.8 MG/DL
BUN SERPL-MCNC: 67 MG/DL
CALCIUM SERPL-MCNC: 10 MG/DL
CHLORIDE SERPL-SCNC: 102 MMOL/L
CO2 SERPL-SCNC: 25 MMOL/L
CREAT SERPL-MCNC: 2.2 MG/DL
DIFFERENTIAL METHOD: ABNORMAL
EOSINOPHIL # BLD AUTO: 0.2 K/UL
EOSINOPHIL NFR BLD: 3.6 %
ERYTHROCYTE [DISTWIDTH] IN BLOOD BY AUTOMATED COUNT: 13.6 %
EST. GFR  (AFRICAN AMERICAN): 45.1 ML/MIN/1.73 M^2
EST. GFR  (NON AFRICAN AMERICAN): 39 ML/MIN/1.73 M^2
GLUCOSE SERPL-MCNC: 91 MG/DL
HCT VFR BLD AUTO: 29.2 %
HGB BLD-MCNC: 10.1 G/DL
IMM GRANULOCYTES # BLD AUTO: 0.02 K/UL
IMM GRANULOCYTES NFR BLD AUTO: 0.4 %
LYMPHOCYTES # BLD AUTO: 1.5 K/UL
LYMPHOCYTES NFR BLD: 32.5 %
MCH RBC QN AUTO: 29.4 PG
MCHC RBC AUTO-ENTMCNC: 34.6 G/DL
MCV RBC AUTO: 85 FL
MONOCYTES # BLD AUTO: 0.7 K/UL
MONOCYTES NFR BLD: 14.3 %
NEUTROPHILS # BLD AUTO: 2.3 K/UL
NEUTROPHILS NFR BLD: 48.3 %
NRBC BLD-RTO: 0 /100 WBC
PLATELET # BLD AUTO: 52 K/UL
PMV BLD AUTO: 11.3 FL
POTASSIUM SERPL-SCNC: 3.6 MMOL/L
PROT SERPL-MCNC: 7.8 G/DL
RBC # BLD AUTO: 3.43 M/UL
SIROLIMUS BLD-MCNC: 6.2 NG/ML
SODIUM SERPL-SCNC: 138 MMOL/L
WBC # BLD AUTO: 4.67 K/UL

## 2018-08-13 PROCEDURE — 82248 BILIRUBIN DIRECT: CPT | Mod: NTX

## 2018-08-13 PROCEDURE — 85025 COMPLETE CBC W/AUTO DIFF WBC: CPT | Mod: NTX

## 2018-08-13 PROCEDURE — 80053 COMPREHEN METABOLIC PANEL: CPT | Mod: NTX

## 2018-08-13 PROCEDURE — 80195 ASSAY OF SIROLIMUS: CPT | Mod: NTX

## 2018-08-13 PROCEDURE — 36415 COLL VENOUS BLD VENIPUNCTURE: CPT | Mod: TXP

## 2018-08-14 ENCOUNTER — TELEPHONE (OUTPATIENT)
Dept: TRANSPLANT | Facility: CLINIC | Age: 28
End: 2018-08-14

## 2018-08-14 ENCOUNTER — HOSPITAL ENCOUNTER (OUTPATIENT)
Dept: RADIOLOGY | Facility: HOSPITAL | Age: 28
Discharge: HOME | End: 2018-08-14
Attending: NURSE PRACTITIONER
Payer: COMMERCIAL

## 2018-08-14 ENCOUNTER — OFFICE VISIT (OUTPATIENT)
Dept: TRANSPLANT | Facility: CLINIC | Age: 28
End: 2018-08-14
Payer: COMMERCIAL

## 2018-08-14 VITALS
TEMPERATURE: 98 F | SYSTOLIC BLOOD PRESSURE: 113 MMHG | HEART RATE: 83 BPM | WEIGHT: 140.19 LBS | OXYGEN SATURATION: 100 % | DIASTOLIC BLOOD PRESSURE: 59 MMHG | RESPIRATION RATE: 16 BRPM | HEIGHT: 66 IN | BODY MASS INDEX: 22.53 KG/M2

## 2018-08-14 DIAGNOSIS — K70.30 ALCOHOLIC CIRRHOSIS OF LIVER WITHOUT ASCITES: ICD-10-CM

## 2018-08-14 DIAGNOSIS — F19.10 SUBSTANCE ABUSE: ICD-10-CM

## 2018-08-14 DIAGNOSIS — Z94.4 S/P LIVER TRANSPLANT: Primary | Chronic | ICD-10-CM

## 2018-08-14 DIAGNOSIS — N18.30 CKD (CHRONIC KIDNEY DISEASE) STAGE 3, GFR 30-59 ML/MIN: ICD-10-CM

## 2018-08-14 DIAGNOSIS — Z91.89 AT RISK FOR OPPORTUNISTIC INFECTIONS: ICD-10-CM

## 2018-08-14 DIAGNOSIS — Z76.82 ORGAN TRANSPLANT CANDIDATE: ICD-10-CM

## 2018-08-14 DIAGNOSIS — D61.818 PANCYTOPENIA: ICD-10-CM

## 2018-08-14 DIAGNOSIS — Z01.818 PRE-TRANSPLANT EVALUATION FOR CHRONIC KIDNEY DISEASE: ICD-10-CM

## 2018-08-14 PROCEDURE — 99214 OFFICE O/P EST MOD 30 MIN: CPT | Mod: S$GLB,,, | Performed by: NURSE PRACTITIONER

## 2018-08-14 PROCEDURE — 99999 PR PBB SHADOW E&M-EST. PATIENT-LVL III: CPT | Mod: PBBFAC,TXP,, | Performed by: INTERNAL MEDICINE

## 2018-08-14 PROCEDURE — 76770 US EXAM ABDO BACK WALL COMP: CPT | Mod: 26,TXP,, | Performed by: RADIOLOGY

## 2018-08-14 PROCEDURE — 76770 US EXAM ABDO BACK WALL COMP: CPT | Mod: TC,TXP

## 2018-08-14 PROCEDURE — 97802 MEDICAL NUTRITION INDIV IN: CPT | Mod: S$GLB,TXP,, | Performed by: DIETITIAN, REGISTERED

## 2018-08-14 PROCEDURE — 93978 VASCULAR STUDY: CPT | Mod: TC,TXP

## 2018-08-14 PROCEDURE — 99214 OFFICE O/P EST MOD 30 MIN: CPT | Mod: S$GLB,TXP,, | Performed by: INTERNAL MEDICINE

## 2018-08-14 PROCEDURE — 93978 VASCULAR STUDY: CPT | Mod: 26,TXP,, | Performed by: RADIOLOGY

## 2018-08-14 PROCEDURE — 99214 OFFICE O/P EST MOD 30 MIN: CPT | Mod: S$GLB,TXP,, | Performed by: TRANSPLANT SURGERY

## 2018-08-14 NOTE — PROGRESS NOTES
PHARM.D. PRE-TRANSPLANT NOTE:    This patient's medication therapy was evaluated as part of his pre-transplant evaluation.    The following pharmacologic concerns were noted: Patient currently on sirolimus (liver Tx 10/19/17), patient with AMS on both tacrolimus and cyclosporine. Patient also with PMH of opioid abuse. Patient placed on suboxone through pain management service, but they have since d/c'd medication.     This patient's medication profile was reviewed for contraindications for DAA Hepatitis C therapy:    [X]  No current inducers of CYP 3A4 or PGP  [X]  No amiodarone on this patient's EMR profile in the last 24 months  [X]  No past or current atrial fibrillation on this patient's EMR profile       Current Outpatient Medications   Medication Sig Dispense Refill    amLODIPine (NORVASC) 5 MG tablet Take 1 tablet (5 mg total) by mouth once daily. 30 tablet 11    bisacodyl (DULCOLAX) 5 mg EC tablet Take 2 tablets (10 mg total) by mouth daily as needed for Constipation. 60 tablet 5    ergocalciferol (VITAMIN D2) 50,000 unit Cap take 1 capsule by mouth every 7 days 4 capsule 2    famotidine (PEPCID) 20 MG tablet Take 1 tablet (20 mg total) by mouth every evening. 30 tablet 11    folic acid (FOLVITE) 1 MG tablet Take 1 tablet (1 mg total) by mouth once daily. 30 tablet 2    levothyroxine (SYNTHROID) 75 MCG tablet Take 1 tablet (75 mcg total) by mouth before breakfast. 30 tablet 11    ondansetron (ZOFRAN-ODT) 8 MG TbDL Take 1 tablet (8 mg total) by mouth every 8 (eight) hours as needed (nausea). 30 tablet 1    sevelamer carbonate (RENVELA) 800 mg Tab Take 2 tablets (1,600 mg total) by mouth 3 (three) times daily with meals. 180 tablet 11    sirolimus (RAPAMUNE) 1 MG Tab Take 1 tablet (1 mg total) by mouth once daily. (Patient taking differently: Take 2 mg by mouth once daily. ) 30 tablet 11    sodium bicarbonate 650 MG tablet Take 1 tablet (650 mg total) by mouth 2 (two) times daily. 60 tablet 11     torsemide (DEMADEX) 20 MG Tab Take 3 tablets (60 mg total) by mouth once daily. 90 tablet 2    ursodiol (ACTIGALL) 300 mg capsule Take 1 capsule (300 mg total) by mouth 2 (two) times daily. 60 capsule 11    allopurinol (ZYLOPRIM) 100 MG tablet Take 1 tablet (100 mg total) by mouth once daily. 30 tablet 11     No current facility-administered medications for this visit.          Currently the patient is responsible for preparing / administering this patient's medications on a daily basis.  I am available for consultation and can be contacted, as needed by the other members of the KidneyTransplant team.

## 2018-08-14 NOTE — Clinical Note
Dr. Craig - I saw this patient in [place]. Ordered updated vaccines, but I recommend he follow up with Dr. Longoria in Transplant ID ( who followed him extensively during his hospitalizations) for final assessment of candidacy and further recommendations.  He is coming back on 8/27 and Dr. Longoria kindly advises that she can see him that afternoon.  I am copying Dr. Longoria's medical assistant to make the appointment at 4 pm (Nbuia, he will also need injection center appointment at same time for vaccines I have ordered).  Thanks.  Jose

## 2018-08-14 NOTE — PROGRESS NOTES
Transplant Surgery  Kidney Transplant Recipient Evaluation    Referring Physician: Georgina Lui  Current Nephrologist: Michael Peterson    Subjective:     Reason for Visit: evaluate transplant candidacy    History of Present Illness: Jhonny Diana is a 28 y.o. year old male undergoing transplant evaluation.    Dialysis History: Jhonny is now off of HD for 1 month, GFR now 39.  Prior HD after liver transplant for several months.  .      Transplant History: 10/19/2017 (Liver)    Etiology of Renal Disease:  (based on medical records from referral).    Review of Systems   All other systems reviewed and are negative.      Objective:     Physical Exam:  Constitutional:   Vitals reviewed: yes   Well-nourished and well-groomed: yes  Eyes:   Sclerae icteric: no   Extraocular movements intact: yes  GI:    Bowel sounds normal: yes   Tenderness: yes    If yes, quadrant/location: RUQ   Palpable masses: no    If yes, quadrant/location: not applicable   Hepatosplenomegaly: no   Ascites: no   Hernia: no    If yes, type/location: possible bulge at right lateral liver txp incision   Surgical scars: yes    If yes, type/location: bilateral subcostal  Resp:   Effort normal: yes   Breath sounds normal: yes    CV:   Regular rate and rhythm: yes   Heart sounds normal: yes   Femoral pulses normal: yes   Extremities edematous: no  Skin:   Rashes or lesions present: no    If yes, describe:not applicable   Jaundice:: no    Musculoskeletal:   Gait normal: yes   Strength normal: yes  Psych:   Oriented to person, place, and time: yes   Affect and mood normal: yes    Additional comments: not applicable    Counseling: We provided Jhonny Diana with a group education session today.  We discussed kidney transplantation at length with him, including risks, potential complications, and alternatives in the management of his renal failure.  The discussion included complications related to anesthesia, bleeding, infection, primary nonfunction,  and ATN.  I discussed the typical postoperative course, length of hospitalization, the need for long-term immunosuppression, and the need for long-term routine follow-up.  I discussed living-donor and -donor transplantation and the relative advantages and disadvantages of each.  I also discussed average waiting times for both living donation and  donation.  I discussed national and center-specific survival rates.  I also mentioned the potential benefit of multicenter listing to candidates listed with centers within more than one organ procurement organization.  All questions were answered.    Final determination of transplant candidacy will be made once evaluation is complete and reviewed by the Kidney & Kidney/Pancreas Selection Committee.         Transplant Surgery - Candidacy   Assessment/Plan:   Jhonny Diana is pre-dialysis with CKD stage 4 (GFR 15-29 mL/min).  I see no surgical contraindication to placing a kidney transplant. Based on available information, Jhonny Diana is a suitable kidney transplant candidate.   Jhonny is now off of HD after prolonged dialysis after liver transplant.  He previously met criteria to be listed for a kidney but now his GFR has improved and he hasnt had HD in a month.  Since he is young, I am concerned with how long his kidney function will remain.      Alin López Jr, MD

## 2018-08-14 NOTE — TELEPHONE ENCOUNTER
Instructed that transplant coordinator will discuss in the morning.  No documentation re: pt leaving [place].

## 2018-08-14 NOTE — PROGRESS NOTES
Transplant Nephrology  Kidney Transplant Recipient Evaluation    Referring Physician: Georgina Lui  Current Nephrologist: Michael Peterson    Subjective:   CC:  Initial evaluation of kidney transplant candidacy.    HPI:  Mr. Diana is a 28 y.o. year old White male who has presented to be evaluated as a potential kidney transplant recipient.  He has CKD stage 3 associated with ATN HRS  secondary to hepatorenal syndrome and acute tubular injury. He was diagnosed with LAURA and alcoholic cirrhosis. svereal Acute injuries to the kidney including sepsis. Patient is currently OFF dialysis. he was on HD for 9 months. last HD was on July 16 2018 and his current eGFR is 39 ml.min. He has a does not have a dialuysis access. The catheter was removed Auguist 2 2018 for dialysis access.     Patient underwent a liver transplant in November 2017 due to alcoholic cirrhosis. He also had LAURA before transplant associated with HRS with superimposed ATI and after the liver transplant associated with multiple septic episodes that required dialysis for almost 9 months. He was dialyzed until July 2018 and now his GFR is between 39 to 40 ml/min, he ius referred to our clinic to be considered for kidney transplant under the safety net policy    Complications after liver transplant; seizures, peritonitis, wound infection, ERCP with biliary stent placement, biliary stricture, mental status changes,  Initially on prograf then switched to Cyclosporine and back to prograf now on Sirolimus. Neutropenia requiring Neupogen injections. 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  Per hematology this may be reactive to underlying organ dysfunction, drug and or peripheral destruction (specifically referring to platelets).  CT abd/pelv/chest 5/15  obtained to evaluate for lymph nodes, no lymphadenopathy seen. Hematology recommending steroids for possible ITP and considering lowering the dose of cyclosporine for possible TMA.  With following these recommendations platelets improved as did anemia.Jerky movements attributed to tardive dyskinesias from reglan, which was d/c'd in addition to cyclosporine. Addition to narcotics requiring Psych consult and ambulatory rehab program       Living DONOR: the patent says that he does not want the mom to donate.    Frailty Index: low    Dialysis adherence:he is OFF dialysis.     Care giver available at the time of clinic visit: yes his mom      Previous Transplant: yes; organ: liver, date: October 19 2017, complications: multiple sepstic episodes and severe LAURA requiering dialysis .    Functional Status: He does not exercise but he stays active. He is able to walk 2 to 3 miles a day. Appetite is fair. No chest pain or SOB.     Past Medical History:  Past Medical History:   Diagnosis Date    Alcoholic cirrhosis 8/14/2018    Alcoholic hepatitis with ascites     Anemia     CKD (chronic kidney disease) stage 3, GFR 30-59 ml/min 8/14/2018    Encounter for blood transfusion     multiple blood transfusions    ESRD on dialysis     History of hematemesis 9/28/2017    Hypertension     Seizures     Substance abuse     alcohol and opioids    Thrombocytopenia     Transplanted liver     10/2017       Past Surgical History:   Past Surgical History:   Procedure Laterality Date    APPENDECTOMY      CENTRAL VENOUS CATHETER TUNNELED INSERTION DOUBLE LUMEN      inserted and removed    ERCP      ESOPHAGOGASTRODUODENOSCOPY      LIVER TRANSPLANT      10/2017       Medications:   Current Outpatient Medications   Medication Sig Dispense Refill    amLODIPine (NORVASC) 5 MG tablet Take 1 tablet (5 mg total) by mouth once daily. 30 tablet 11    bisacodyl (DULCOLAX) 5 mg EC tablet Take 2 tablets (10 mg total) by mouth daily as  needed for Constipation. 60 tablet 5    ergocalciferol (VITAMIN D2) 50,000 unit Cap take 1 capsule by mouth every 7 days 4 capsule 2    famotidine (PEPCID) 20 MG tablet Take 1 tablet (20 mg total) by mouth every evening. 30 tablet 11    folic acid (FOLVITE) 1 MG tablet Take 1 tablet (1 mg total) by mouth once daily. 30 tablet 2    levothyroxine (SYNTHROID) 75 MCG tablet Take 1 tablet (75 mcg total) by mouth before breakfast. 30 tablet 11    ondansetron (ZOFRAN-ODT) 8 MG TbDL Take 1 tablet (8 mg total) by mouth every 8 (eight) hours as needed (nausea). 30 tablet 1    sevelamer carbonate (RENVELA) 800 mg Tab Take 2 tablets (1,600 mg total) by mouth 3 (three) times daily with meals. 180 tablet 11    sirolimus (RAPAMUNE) 1 MG Tab Take 1 tablet (1 mg total) by mouth once daily. (Patient taking differently: Take 2 mg by mouth once daily. ) 30 tablet 11    sodium bicarbonate 650 MG tablet Take 1 tablet (650 mg total) by mouth 2 (two) times daily. 60 tablet 11    torsemide (DEMADEX) 20 MG Tab Take 3 tablets (60 mg total) by mouth once daily. 90 tablet 2    ursodiol (ACTIGALL) 300 mg capsule Take 1 capsule (300 mg total) by mouth 2 (two) times daily. 60 capsule 11    allopurinol (ZYLOPRIM) 100 MG tablet Take 1 tablet (100 mg total) by mouth once daily. 30 tablet 11     No current facility-administered medications for this visit.        Social History:  Social History     Socioeconomic History    Marital status: Single     Spouse name: Not on file    Number of children: Not on file    Years of education: Not on file    Highest education level: Not on file   Social Needs    Financial resource strain: Not on file    Food insecurity - worry: Not on file    Food insecurity - inability: Not on file    Transportation needs - medical: Not on file    Transportation needs - non-medical: Not on file   Occupational History     Comment: full time    Tobacco Use    Smoking status: Former Smoker     Packs/day: 1.00      "Years: 10.00     Pack years: 10.00     Types: Cigarettes     Start date: 2/19/2017    Smokeless tobacco: Never Used   Substance and Sexual Activity    Alcohol use: No     Comment: a fifth of liquor daily for years, cut back over last 2 months    Drug use: No    Sexual activity: Not on file     Comment: single   Other Topics Concern    Not on file   Social History Narrative    He lives in [facility] with his roommate        Family History:   Family History   Problem Relation Age of Onset    No Known Problems Mother     Cancer Father         Lung cancer    Alcohol abuse Father     No Known Problems Sister     No Known Problems Brother     Kidney disease Neg Hx        Review of Systems  Skin: no skin rash  CNS; no headaches, blurred vision, seizure, or syncope  ENT: No JVD,  Adenopathies,  nasal congestion. No oral lesions  Cardiac: No chest pain, dyspnea, claudication, edema or palpitations  Respiratory: No SOB, cough, hemoptysis   Gastro-intestinal: No diarrhea, constipation, abdominal pain, nausea, vomit. No ascitis  Genitourinary: no hematuria, dysuria, frequency, frequency  Musculoskeletal: joint pain, arthritis or vasculitic changes  Psych: alert awake, oriented, No cranial nerves deficit.        Objective:   Blood pressure (!) 113/59, pulse 83, temperature 98.2 °F (36.8 °C), temperature source Oral, resp. rate 16, height 5' 6" (1.676 m), weight 63.6 kg (140 lb 3.4 oz), SpO2 100 %.body mass index is 22.63 kg/m².    Physical Exam  Head: normocephalic  Neck: No JVD, cervical axillary, or femoral adenopathies  Heart: no murmurs, Normal s1 and s2, No gallops, no rubs, No murmurs  Lungs; CTA, good respiratory effort, no crackles  Abdomen: soft, non tender, no splenomegaly or hepatomegaly, no massess, no bruits  Extremities: No edema, skin rash, joint pain  SNC: awake, alert oriented. Cranial nerves are intact, no focalized, sensitivity and strength preserved      Labs:  Lab Results   Component Value " Date    WBC 3.91 2018    HGB 9.3 (L) 2018    HCT 26.4 (L) 2018     2018    K 3.4 (L) 2018     2018    CO2 27 2018    BUN 65 (H) 2018    CREATININE 2.2 (H) 2018    EGFRNONAA 39.0 (A) 2018    CALCIUM 9.9 2018    PHOS 5.2 (H) 2018    MG 2.0 2018    ALBUMIN 3.5 2018    AST 39 2018    ALT 31 2018    UTPCR 0.33 (H) 2018    .0 (H) 2018    TACROLIMUS <1.5 (L) 2018    CYCLOSPORINE <10 (L) 2018    SIROLIMUSLEV 6.2 2018       Lab Results   Component Value Date    PREALBUMIN 20 2018    BILIRUBINUA Negative 2018     (H) 10/26/2017    AMYLASE 36 2018    LIPASE 42 2018    PROTEINUA Negative 2018    NITRITE Negative 2018    RBCUA 0 2018    WBCUA 0 2018       No results found for: HLAABCTYPE    Labs were reviewed with the patient.    Assessment:     1. S/P liver transplant    2. CKD (chronic kidney disease) stage 3, GFR 30-59 ml/min    3. Alcoholic cirrhosis of liver without ascites    4. At risk for opportunistic infections    5. Substance abuse        Plan:     Transplant Candidacy:   After obtaining history and performing physical exam as well as reviewing available diagnostic studies, Mr. Diana will need a very close follow up of his kidney function. At the present time his GFR by CrCl is 39 ml/min .  His time for listing using safety net policy will  2018. In the past he has experienced various complications with prolonged  LAURA requiring dialysis and multiple  hospital admissions. His last 2 GFR are 39 and 41 ml/min which is consistent with CKD stage 3 . He is currently OFF dilaysis since mid 2018    Will discuss with the transplant team.    I spoke at length with the patient and mom and explained the safety net policy described below     I reviewed medical records that precede liver transplant and multiple  admissions after his liver transplant    Also reviewed Psyc/addition Psych note. This issues was discussed in detail with the patient. I made it clear that substance abuse including alcohol and or opioids is a contra-indication for kidney transplant. He will need random drug screen tests and follow up with addition Pschy. Will also discuss with our SW.    If he qualifies for kidney transplant, will need to have discussion with transplant team about immunosuppression due to serious side effects that he had with Cyclosporine and Prograf.    He remains pancytopenic with a PLT count of 40,000. Obviously this is a concerns for surgical risks and appropriate use of immunosuppression after kidney transplant if he is deemed in need of a kidney transplant    Education provided    We spoke for 45 minutes      All questions answered           According with safety net policy : see below:  8.5.G Prioritization for Liver Recipients on the Kidney Waiting List    If a kidney candidate received a liver transplant, but not a liver and kidney transplant from the  same  donor, the candidate will be classified as a prior liver recipient. This classification  gives priority to a kidney candidate if both of the following criteria are met:  1. The candidate is registered on the kidney waiting list prior to the one-year anniversary of the  candidates most recent liver transplant date  2. On a date that is at least 60 days but not more than 365 days after the candidates liver  transplant date, at least one of the following criteria is met:  ? The candidate has a measured or calculated creatinine clearance (CrCl) or glomerular  filtration rate (GFR) less than or equal to 20 mL/min.  ? The candidate is on dialysis.  When the transplant program reports that the candidate meets the criteria for this classification,  the candidate will remain at this classification for 30 days from the date of the qualifying test or  treatment. If the  transplant program reports additional qualifying tests or treatments, then the  candidate will remain at this classification for 30 days from the most recent date of the test or  treatment. If the transplant program reports that the candidate meets the criteria for 90  consecutive days, the candidate will remain at this classification until the candidate is removed  from the kidney waiting list. If the candidate transfers kidney waiting time according to Policy  3.6.C: Individual Waiting Time Transfers and has met the criteria for 90 consecutive days, then  the candidates classification will be included in the transfer.  If a liver recipient receives a kidney using this priority classification and returns to the kidney  waiting list after the most recent kidney transplant, the candidate must again meet the criteria for  this classification, unless the candidate qualifies for kidney waiting time reinstatement according  to Policy 3.6.B.i: Non-function of a Transplanted Kidney. If the candidate qualifies for kidney  waiting time reinstatement, the candidate will be classified as qualifying for the classification.  If a kidney candidate received a liver and kidney transplant from the same  donor, the  candidate will only qualify for this classification if the candidate qualifies for kidney waiting time  reinstatement according to Policy 3.6.B.i: Non-function of a Transplanted Kidney      Final determination of transplant candidacy will be made once workup is complete and reviewed by the selection committee.          Scotty Craig MD       [facility] Patient Status  Functional Status: 70% - Cares for self: unable to carry on normal activity or active work  Physical Capacity: No Limitations

## 2018-08-14 NOTE — LETTER
August 15, 2018        Michael Peterson  [address]  [facility] [address]  Phone: [phone]  Fax: [phone]             [facility]- Transplant  [address]  [facility] [address]  Phone: [phone]   Patient: Jhonny Diana   MR Number: [id number]   YOB: 1990   Date of Visit: 8/14/2018       Dear Dr. Michael Peterson    Thank you for referring Jhonny Diana to me for evaluation. Attached you will find relevant portions of my assessment and plan of care.    If you have questions, please do not hesitate to call me. I look forward to following Jhonny Diana along with you.    Sincerely,    Scotty Craig MD    Enclosure    If you would like to receive this communication electronically, please contact [email or web address] or [phone] to request [email or web address] Link access.    [email or web address] Link is a tool which provides read-only access to select patient information with whom you have a relationship. Its easy to use and provides real time access to review your patients record including encounter summaries, notes, results, and demographic information.    If you feel you have received this communication in error or would no longer like to receive these types of communications, please e-mail [email or web address]

## 2018-08-14 NOTE — PROGRESS NOTES
Pre Transplant Infectious Diseases Consult  Kidney Transplant Recipient Evaluation    Requesting Physician: Dr. Craig       Reason for Visit:    Chief Complaint   Patient presents with    Kidney Transplant Evaluation     History of Present Illness  Jhonny Diana is a 28 y.o. year old White male with advanced Kidney disease 2/2 hepatorenal syndrome who currently being evaluated for Kidney transplant. He is currently off dialysis.  Was on dialysis for 9 months. No dialysis access.  HD catheter removed 8/2.       He has history of ETOH cirrhosis and underwent liver transplant 10/19/2017.  He reports multiple episodes of sepsis and severe LAURA requiring dialysis.     Followed extensively by inpatient Transplant ID. Last seen in March of this year.  Per HPI 3/21/18 he has history as follows:     -  superificial wound infection s/p wound vac to [facility] through 1/12, and several recent admissions on 11/24 and 12/3 with culture negative peritonitis with peak ascites WBC ~5k due to suspected indolent biliary leakage s/p sphincterotomy/biliary stent placement over CBD stricture on 12/6 ERCP vs another IA source who was admitted on 1/11/2017 with acute onset fevers and acute on chronic N/V/abdominal pain (RLQ worst) and was found to have a loculated RLQ collection on imaging (not safe for percutaneous drainage), for which, after failure with more conservative treatments, he ultimately underwent laparoscopic drainage on 2/7/2018 (with dark brown fluid drained, culture negative but after extensive antibiotic exposure) but without complete resolution. He achieved defervescence but had persistent N/V/abdominal pain on empiric ertapenem with persistent loculated fluid noted on imaging that is not accessible to percutaneous drainage. Extensive FUO workup has been performed that has failed to yield another obvious etiology for his fevers (including serial chest CT's, bland thoracenteses, negative fungal markers/quant gold  intermediate, serial CT A/P with IV/oral contrast at times notable for residual fluid in RLQ abscess following drainage last seen on 2/26 CT A/P 8x5cm; negative sinus CT; repeat EGD with erythematous, congested, nodular gastric mucosa and a duodenal ulcer 3/6/2018 - biopsy pending; negative SBFT; negative TTE; negative ultrasound for thrombus; serial ERCP's without recent pathology; negative liver biopsy; negative MRI; 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   Antibiotics stopped per their note on 3/20.       Patient denies any recent fever, chills, or infective illnesses, though reportedly just completed a course of doxycycline for possible bilateral lower extremity cellulitis.  Review of records shows admission with knee pain.  Per notes, low suspicion for septic joint. Evaluated by orthopedics who had low suspicion for septic joint.  No WBC sent, but GS negative, aerobic and anaerobic cultures negative.  No current complaints of pain.       1) Do you have a history of:   YES NO   Diabetes      [] [x]     Diabetic Foot Infection/Bone Infection  []        [x]     Surgical Removal of Spleen   []  [x]                  2) Have you had recurrent infections involving:             YES NO  Sinus infections  []         [x]   Sore Throat   []         [x]                 Prostate Infections  []         [x]              Bladder Infections  []         [x]                     Kidney Infections  []         [x]                               Intestinal Infections  []         [x]      Skin Infections   []         [x]       Reproductive Infections          []  [x]   Periodontal Disease  []         [x]        3)Have you ever had: YES     NO UNKNOWN      Chicken Pox   [x]         []  []   Shingles   []         [x]  []   Orolabial Herpes             []  [x]  []   Genital Herpes  []         [x]  []   Cytomegalovirus  []         [x]  []   Christopher-Barr Virus  []         [x]  []   Genital Warts   []         [x]  []    Hepatitis A   []         [x]  []   Hepatitis B   []         [x]  []   Hepatitis C   []         [x]  []   Syphilis   []         [x]  []   Gonorrhea   []         [x]  []   Pelvic Inflammatory   Disease   []         [x]  []   Chlamydia Infection  []         [x]  []   Intestinal parasites   or worms   []         [x]  []   Fungal Infections  []         [x]  []   Blood Infections  []         [x]  []       Comment:       4) Have you ever been exposed   YES NO  To someone with tuberculosis?  []   [x]   If yes, what treatment did you receive:     5) What states have you lived in? [place], [place], [place], [place]     6) What countries have you visited for more than 2 weeks?    No foreign travel                     YES NO  7) Did you have any associated infections?  []  [] n/a      8) Are you planning to travel outside the    []  [x]   [facility] after your transplant?    9) Household                   YES NO  Do you have pets living in your house?    [x]         []   If yes, describe: cats/  Do you spend time or live on a farm or     []         [x]   have livestock or other farm animals?  If yes, which ones:    Do you have a fish tank?          []  [x]       Do you have a litter box?      []         [x]     Do you fish or hunt?       []         [x]     Do you clean or skin fish or animals?    []         [x]     Do you consume raw or undercooked    []         [x]   meat, fish, or shellfish?      10) What occupations have you had?   for [employer]     11) Patient reports hobby to be indoor activities           12)Do you garden or otherwise  YES NO   work in the soil?    []         [x]   13)Do you hike, camp, or spend     time in wooded areas?   []         [x]        14) The patient's immunization history was reviewed.    Have you ever received:  YES NO UNKNOWN DATES   Routine Childhood vaccines  [x]         []  []      Influenza vaccine   [x]  []  [] 10/12/17   Prevnar - 10/12/2017    Pneumovax    []  [x]  []      Tetanus-diptheria   [x]         []  [] 10/12/2017    Hepatitis A vaccine series       []  []  []    Hepatitis B vaccine series         [x]  []  [] one dose - high dose 10/12/17    Meningitis vaccine   []         []  [x]    Varicella vaccine   []         []  [x]        Based on the patients immunization history and serologies, immunizations were ordered:         Ordered  Not Ordered  Influenza Vaccine     []    [x]   Hepatitis A series at 0,  6 months   [x]    []   Hepatitis B seriesat 0, 1, and 6 months  []    []   Hepatitis B High Dose 0,1, and 6 months  [x]    []   Prevnar      []    [x]   Pneumovax      [x]    []    TDap       []    [x]    Zoster       []    [x]   Menactra      []    [x]            The patient was encouraged to contact us about any problems that may develop after immunization and possible side effects were reviewed.      Previous Transplant: yes; organ: liver, date: October 2017, complications: none.    Etiology of Kidney Disease: post - liver txplnt    Allergies: Bactrim [sulfamethoxazole-trimethoprim]  Immunization History   Administered Date(s) Administered    Hepatitis A, Pediatric/Adolescent, 2 Dose 10/11/2017    Hepatitis B, Adult 10/12/2017    Influenza - High Dose 10/12/2017    Pneumococcal Conjugate - 13 Valent 10/12/2017    Tdap 10/11/2017     Past Medical History:   Diagnosis Date    Alcoholic cirrhosis 8/14/2018    Alcoholic hepatitis with ascites     Anemia     CKD (chronic kidney disease) stage 3, GFR 30-59 ml/min 8/14/2018    Encounter for blood transfusion     multiple blood transfusions    ESRD on dialysis     History of hematemesis 9/28/2017    Hypertension     Seizures     Substance abuse     alcohol and opioids    Thrombocytopenia     Transplanted liver     10/2017     Past Surgical History:   Procedure Laterality Date    APPENDECTOMY      CENTRAL VENOUS CATHETER TUNNELED INSERTION DOUBLE LUMEN      inserted and removed    ERCP       ESOPHAGOGASTRODUODENOSCOPY      LIVER TRANSPLANT      10/2017      Social History     Socioeconomic History    Marital status: Single     Spouse name: Not on file    Number of children: Not on file    Years of education: Not on file    Highest education level: Not on file   Social Needs    Financial resource strain: Not on file    Food insecurity - worry: Not on file    Food insecurity - inability: Not on file    Transportation needs - medical: Not on file    Transportation needs - non-medical: Not on file   Occupational History     Comment: full time    Tobacco Use    Smoking status: Former Smoker     Packs/day: 1.00     Years: 10.00     Pack years: 10.00     Types: Cigarettes     Start date: 2/19/2017    Smokeless tobacco: Never Used   Substance and Sexual Activity    Alcohol use: No     Comment: a fifth of liquor daily for years, cut back over last 2 months    Drug use: No    Sexual activity: Not on file     Comment: single   Other Topics Concern    Not on file   Social History Narrative    He lives in [facility] with his roommate        Review of Systems   Constitution: Negative for chills, decreased appetite, fever, weakness, malaise/fatigue, night sweats, weight gain and weight loss.   HENT: Negative for congestion, ear pain, hearing loss, hoarse voice, sore throat and tinnitus.    Eyes: Negative for blurred vision, redness and visual disturbance.   Cardiovascular: Negative for chest pain, leg swelling and palpitations.   Respiratory: Negative for cough, hemoptysis, shortness of breath, sputum production and wheezing.    Endocrine: Negative for cold intolerance and heat intolerance.   Hematologic/Lymphatic: Negative for adenopathy. Does not bruise/bleed easily.   Skin: Negative for dry skin, itching, rash and suspicious lesions.   Musculoskeletal: Negative for back pain, joint pain, myalgias and neck pain.   Gastrointestinal: Negative for abdominal pain, constipation, diarrhea, heartburn,  nausea and vomiting.   Genitourinary: Negative for dysuria, flank pain, frequency, hematuria, hesitancy and urgency.   Neurological: Negative for dizziness, headaches, numbness and paresthesias.   Psychiatric/Behavioral: Negative for depression and memory loss. The patient does not have insomnia and is not nervous/anxious.    Allergic/Immunologic: Negative for environmental allergies, HIV exposure, hives and persistent infections.     Physical Exam   Constitutional: He is oriented to person, place, and time. He appears well-developed and well-nourished.   HENT:   Head: Normocephalic and atraumatic.   Mouth/Throat: Uvula is midline, oropharynx is clear and moist and mucous membranes are normal. He does not have dentures. No oral lesions. Normal dentition. No dental abscesses, lacerations or dental caries.   Eyes: Conjunctivae and lids are normal. No scleral icterus.   Neck: Neck supple.   Cardiovascular: Normal rate and regular rhythm. Exam reveals no gallop and no friction rub.   No murmur heard.  Pulmonary/Chest: Effort normal and breath sounds normal. No respiratory distress. He has no decreased breath sounds. He has no wheezes. He has no rhonchi. He has no rales.   Abdominal: Soft. Normal appearance, normal aorta and bowel sounds are normal. He exhibits no distension and no mass. There is no hepatosplenomegaly. There is no tenderness. There is no rebound and no guarding.   Chevron scar - well healed    Musculoskeletal: He exhibits no edema.   Lymphadenopathy:        Head (right side): No submental, no submandibular, no tonsillar, no preauricular, no posterior auricular and no occipital adenopathy present.        Head (left side): No submental, no submandibular, no tonsillar, no preauricular, no posterior auricular and no occipital adenopathy present.     He has no cervical adenopathy.     He has no axillary adenopathy.        Right: No inguinal, no supraclavicular and no epitrochlear adenopathy present.         Left: No inguinal, no supraclavicular and no epitrochlear adenopathy present.   Neurological: He is alert and oriented to person, place, and time. No cranial nerve deficit.   Skin: Skin is warm, dry and intact. No lesion and no rash noted. He is not diaphoretic. No erythema. No pallor.   Psychiatric: He has a normal mood and affect. His behavior is normal.     Diagnostics:   RPR   Date Value Ref Range Status   10/06/2017 Non-reactive Non-reactive Final     No results found for: CMVANTIBODIE  No results found for: HIV1X2  No results found for: HTLVIIIANTIB  Hepatitis B Surface Ag   Date Value Ref Range Status   04/07/2018 Negative  Final     Hep B Core Total Ab   Date Value Ref Range Status   02/17/2018 Negative  Final     Hepatitis C Ab   Date Value Ref Range Status   04/07/2018 Negative  Final     Toxoplasma gondii IGG   Date Value Ref Range Status   10/06/2017 7.8 (H) 0.0 - 6.4 IU/mL Final     No components found for: TOXOIGGINTER  HSV 1 IgG   Date Value Ref Range Status   10/06/2017 Negative Negative Final     HSV 2 IgG   Date Value Ref Range Status   10/06/2017 Negative Negative Final     Varicella IgG   Date Value Ref Range Status   10/06/2017 2.30 (H) 0.00 - 0.90 ISR Final     Varicella Interpretation   Date Value Ref Range Status   10/06/2017 Positive (A) Negative Final     Comment:     <or=0.90     Negative        No detectable IgG antibody to Varicella zoster  by the CATRACHO test. Such individuals are presumed to be   uninfected with Varicella zoster and to be susceptible to   primary infection.  0.91-1.09    Equivocal  >or=1.10     Positive        Indicates presence of detectable IgG antibody to Varicella   zoster by the CATRACHO test. Indicative of previous or current   infection.       Strongyloides Ab IgG   Date Value Ref Range Status   10/06/2017 Negative Negative Final     Comment:     No detectable levels of IgG antibodies to Strongyloides.  Repeat testing in 1-2 weeks if clinically indicated.  Test  Performed by:  [facility]  [address] [place] [address]       No results found for: EPSTEINBARRV  Hep B S Ab   Date Value Ref Range Status   02/17/2018 Negative  Final     No results found for: QUANTIFERON  Hep A IgM   Date Value Ref Range Status   04/07/2018 Negative  Final     No results found for: PPD  No results found for this or any previous visit.         Transplant Infectious Diseases - Candidacy    Assessment/Plan:     Transplant Candidacy: Based on available information, there are no identified significant barriers to transplantation from an infectious disease standpoint pending acceptable serologies.        1.  Vaccines recommended:  Patient returning 8/27 and wishes to schedule vaccines at that time. Have ordered Hepatitis A second dose, Hepatitis B (high dose, this will be second dose), Pneumovax (PPSV23).  Will need 3rd Hepatitis B no sooner than 2 months after 2nd dose ( on or after 10/27/18).  The patient was encouraged to contact us about any problems that may develop after immunization and possible side effects were reviewed.   2.  Quantiferon Gold is pending.  If positive, please consult ID. If  Indeterminate, please draw T spot.  If T spot positive, please consult ID.    3.  RPR, strongyloides, HIV pending.  If positive, please consult ID  4.  Varicella IgG pending.  Please consult ID for vaccine recommendations if negative.   5.  Given extensive post-liver transplant inpatient ID history, recommend follow up with Transplant ID in clinic for final assessment of transplant candidacy and any further workup recommendations.     Final determination of transplant candidacy will be made once evaluation is complete and reviewed by the Transplant Selection Committee.    Anayeli Napier, APRN, ANP         Counseling:  I discussed with Jhonny and his mother  the risk for increased susceptibility to infections following transplantation including increased  risk for infection right after transplant and if rejection should occur.  The patients has been counseled on the importance of vaccinations including but not limited to a yearly flu vaccine.    Specific guidance has been provided to the patient regarding the patients occupation, hobbies and activities to avoid future infectious complications including but not limited to avoiding undercooked meats and seafood, proper hygiene, and contact with animals.

## 2018-08-14 NOTE — PROGRESS NOTES
TRANSPLANT NUTRITIONAL ASSESSMENT    Referring Provider: Scotty Craig MD    Reason for Visit: Pre-kidney transplant work-up (pre-dialysis)    Age: 28 y.o.  Sex: male    Patient Active Problem List   Diagnosis    S/P liver transplant    Prophylactic immunotherapy    Long-term use of immunosuppressant medication    At risk for opportunistic infections    Ascites due to alcoholic cirrhosis    Biliary stricture of transplanted liver    Hypothyroidism    Vitamin D deficiency    Alcohol use disorder, severe, in early remission    Anemia of chronic renal failure    Chronic kidney disease-mineral and bone disorder    Other pancytopenia    Thrombocytopenia    Abdominal pain    Anxiety about health    Opioid dependence    Mild protein-calorie malnutrition    Cellulitis, leg    Left knee pain    CKD (chronic kidney disease) stage 3, GFR 30-59 ml/min    Alcoholic cirrhosis    Substance abuse     Past Medical History:   Diagnosis Date    Alcoholic cirrhosis 8/14/2018    Alcoholic hepatitis with ascites     Anemia     CKD (chronic kidney disease) stage 3, GFR 30-59 ml/min 8/14/2018    Encounter for blood transfusion     multiple blood transfusions    ESRD on dialysis     History of hematemesis 9/28/2017    Hypertension     Seizures     Substance abuse     alcohol and opioids    Thrombocytopenia     Transplanted liver     10/2017     Lab Results   Component Value Date    GLU 93 08/14/2018    K 3.4 (L) 08/14/2018    PHOS 5.2 (H) 08/14/2018    MG 2.0 07/11/2018    CHOL 216 (H) 08/14/2018    HDL 36 (L) 08/14/2018    TRIG 200 (H) 08/14/2018    ALBUMIN 3.5 08/14/2018    PREALBUMIN 20 05/22/2018    AMMONIA 105 (H) 10/11/2017    HGBA1C <4.0 (A) 10/18/2017    CALCIUM 9.9 08/14/2018       Current Outpatient Medications   Medication Sig    amLODIPine (NORVASC) 5 MG tablet Take 1 tablet (5 mg total) by mouth once daily.    bisacodyl (DULCOLAX) 5 mg EC tablet Take 2 tablets (10 mg total) by mouth daily  "as needed for Constipation.    ergocalciferol (VITAMIN D2) 50,000 unit Cap take 1 capsule by mouth every 7 days    famotidine (PEPCID) 20 MG tablet Take 1 tablet (20 mg total) by mouth every evening.    folic acid (FOLVITE) 1 MG tablet Take 1 tablet (1 mg total) by mouth once daily.    levothyroxine (SYNTHROID) 75 MCG tablet Take 1 tablet (75 mcg total) by mouth before breakfast.    ondansetron (ZOFRAN-ODT) 8 MG TbDL Take 1 tablet (8 mg total) by mouth every 8 (eight) hours as needed (nausea).    sevelamer carbonate (RENVELA) 800 mg Tab Take 2 tablets (1,600 mg total) by mouth 3 (three) times daily with meals.    sirolimus (RAPAMUNE) 1 MG Tab Take 1 tablet (1 mg total) by mouth once daily. (Patient taking differently: Take 2 mg by mouth once daily. )    sodium bicarbonate 650 MG tablet Take 1 tablet (650 mg total) by mouth 2 (two) times daily.    torsemide (DEMADEX) 20 MG Tab Take 3 tablets (60 mg total) by mouth once daily.    ursodiol (ACTIGALL) 300 mg capsule Take 1 capsule (300 mg total) by mouth 2 (two) times daily.    allopurinol (ZYLOPRIM) 100 MG tablet Take 1 tablet (100 mg total) by mouth once daily.     No current facility-administered medications for this visit.      Allergies: Bactrim [sulfamethoxazole-trimethoprim]    Ht Readings from Last 1 Encounters:   08/14/18 5' 6" (1.676 m)     Wt Readings from Last 1 Encounters:   08/14/18 63.6 kg (140 lb 3.4 oz)      BMI: Body mass index is 22.63 kg/m².    Usual Weight: reports wt fluctuates, states highest wt was 150's when he was on steroids  Current Diet: post organ transplant diet  Appetite/Current Intake: good   Exercise/Physical Activity: no exercise regimen, tries to stay active and does some walking  Nutritional/Herbal Supplements: folic acid, vitamin D, renvela  Chewing/Swallowing Problems: none reported  Symptoms: none    Estimated Kcal Need: 1890kcals/day (30kcals/kg BW)  Estimated Protein Need: 63gms protein/day (1gm/kg BW)    Nutritional " History: Pt is here today with his mom, Sanjuanita.  Pt and mom both prepare meals and grocery shop.  Dining out/fast food: 1-2 times per week typically Chinese foods or breakfast from [employer].  Pt states he has a hard time remembering to take renvela and consumes high phos foods.  Pt states he was not eating well and lost a lot of wt in the past but has been eating well and weight more stable over the past few months.    Diet Recall:  B: sometimes skips breakfast or may have biscuits, Mid-morning snack: occasionally popcorn, L: chicken or turkey sandwich OR pizza lunchable, water or diet root beer, D: cabbage/ground beef/veggie soup, garlic bread, water, Evening snack: ice cream or pizza lunchable or canned ravioli.    Nutritional Diagnoses  Problem: food- and nutrition-related knowledge deficit  Etiology: related to limited exposure to renal diet guidelines  Symptoms: as evidenced by pt report    Educational Need? yes  Barriers: none identified  Discussed with: patient and mother  Interventions: Patient taught nutrition information regarding   -Renal Nutrition Therapy packet reviewed ( high/low food sources of K, Phos and protein, low sodium and fluid intake, emphasis on moderate protein intake)   Goals/Recommendations: diet adherence, choose healthy options when dining out and aerobic exercises as medically able  Actions Taken: instruct/provide written information  Patient and/or family comprehend instructions: yes  Outcome: Verbalizes understanding  Monitoring: contact information provided, will follow-up as needed      Counseling Time: 15 minutes

## 2018-08-14 NOTE — TELEPHONE ENCOUNTER
----- Message from Georgina Rogers MD sent at 8/13/2018 12:04 PM CDT -----  Labs stable, no action needed.

## 2018-08-14 NOTE — TELEPHONE ENCOUNTER
----- Message from Jolly Rosario sent at 8/14/2018  2:23 PM CDT -----  Contact: Gillian Diana  Needs Advice    Reason for call:    Called to see if they're ok to leave [employer] apartments  Communication Preference: [phone] or apartment phone number [phone]  Additional Information: n/a

## 2018-08-15 ENCOUNTER — TELEPHONE (OUTPATIENT)
Dept: TRANSPLANT | Facility: CLINIC | Age: 28
End: 2018-08-15

## 2018-08-15 NOTE — PROGRESS NOTES
INITIAL PATIENT EDUCATION NOTE    Mr. Jhonny Diana was seen in pre-kidney transplant clinic for evaluation for kidney, kidney/pancreas or pancreas only transplant.  The patient attended a group education session that discussed/reviewed the following aspects of transplantation: evaluation and selection committee process, [facility]OS waitlist management/multiple listings, types of organs offered (KDPI < 85%, KDPI > 85%, PHS increased risk, DCD), financial aspects, surgical procedures, dietary instruction pre- and post-transplant, health maintenance pre- and post-transplant, post-transplant hospitalization and outpatient follow-up, potential to participate in a research protocol, and medication management and side effects.  A question and answer session was provided after the presentation.    The patient was seen by all members of the multi-disciplinary team to include: Nephrologist/PA, Surgeon, , Transplant Coordinator, , Pharmacist and Dietician (if applicable).    The patient reviewed and signed all consents for evaluation which were witnessed and sent to scanning into the EPIC chart.    The patient was given an education book and plan for further evaluation based on his individual assessment.      The patient was encouraged to call with any questions or concerns.

## 2018-08-15 NOTE — LETTER
August 15, 2018    Jhonny Diana  [address]  [facility] [address]          Dr. Sam Cai,        Mr Jhonny Diana received a liver transplant on October 19,2017. He has been staying in [place] with his mother for continued follow up care and is now cleared to return home to [place]. We ask that he establishes with a local Primary Care Doctor to manage non-transplant related issues. I have included patient records, and recent lab work. He will be doing weekly lab work for our close monitoring. If you would like to order any specific tests, please let me know and I can add them to his standing labs. If you have any questions/concerns, please call me directly at [phone].    Sincerely,      Lubna Agustin, RN, BSN, [facility]  Liver Transplant Coordinator  [facility]  [facility] [place] [address] [phone]

## 2018-08-15 NOTE — TELEPHONE ENCOUNTER
----- Message from Jenniffer Rollins sent at 8/15/2018  9:40 AM CDT -----  Contact: mother  Calling to speak w/Kong regarding conversation from yesterday/mother states she was to receive a call this morning     Pt contact [phone]

## 2018-08-15 NOTE — TELEPHONE ENCOUNTER
jalened pt's mother back. She [place] to [place]. He finished therapy. Her mother had an urgent issue that she needed to return home. Pt is stable and coord will get labs set up at the lab he chooses. Will let dr peterson know of development.

## 2018-08-15 NOTE — TELEPHONE ENCOUNTER
----- Message from Savanna Hernandez sent at 8/15/2018  2:07 PM CDT -----  Contact: Patient  Needs Advice    Reason for call:    Patient wishes to speak with Lubna and give update  Communication Preference: [phone]  Additional Information: n/a

## 2018-08-15 NOTE — TELEPHONE ENCOUNTER
Returned call to Jhonny. We discussed that he has an appt with a PCP Dr Sam Cai on 8/22. He will let coord know what lab he would like to use and will fax order to them. Will schedule f/u appt in 3 mo. Will cancel 8/27 appt per dr peterson.

## 2018-08-16 ENCOUNTER — TELEPHONE (OUTPATIENT)
Dept: HEMATOLOGY/ONCOLOGY | Facility: CLINIC | Age: 28
End: 2018-08-16

## 2018-08-16 NOTE — PROGRESS NOTES
Transplant Recipient Adult Psychosocial Assessment (KIDNEY UPDATE)     Jhonny Diana  [address]  [facility] [address]    Telephone Information:   [place] [phone]   Home  [phone] (home)  Work  There is no work phone number on file.  E-mail  [email or web address]    Sex: male  YOB: 1990  Age: 28 y.o.    Encounter Date: 2018  U[place] Citizen: yes  Primary Language: English   Needed: no    Emergency Contact:  Name: Sanjuanita Diana  Relationship: mother  Phone Numbers:  [phone](mobile).     Family/Social Support:   Number of dependents/: None  Marital history: Single. Never .   Other family dynamics: Pt's mother, Sanjuanita Diana (54) will be pt's primary caregiver. Pt's father has an extensive hx of alcoholism and is now . Pt reported he has 6 siblings, whom he has a close relationship with. Pt reported he has a good relationship with his roommate, Carley and stated that she is very supportive. Pt's mother reported she knows Carley and feels that she would be very supportive in pt's sobriety. Pt received a liver transplant from [facility] on 10/19/2017. Pt experienced a difficult recovery and at time of assessment resides at [facility]. On this day, liver team is deciding if pt can return home.       Pt currently resides with mother Annette at the [facility].       Household Composition:  Name: Carley Schwarz- [phone]  Age: 23  Relationship: roomate  Does person drive? yes    Do you and your caregivers have access to reliable transportation? yes  PRIMARY CAREGIVER: Sanjuanita Diana will be primary caregiver, phone number [phone]     provided in-depth information to patient and caregiver regarding pre- and post-transplant caregiver role.   strongly encourages patient and caregiver to have concrete plan regarding post-transplant care giving, including back-up caregiver(s) to ensure care giving needs are met  as needed.    Patient and Caregiver states understanding all aspects of caregiver role/commitment and is able/willing/committed to being caregiver to the fullest extent necessary.    Patient and Caregiver verbalizes understanding of the education provided today and caregiver responsibilities.         remains available. Patient and Caregiver agree to contact  in a timely manner if concerns arise.      Able to take time off work without financial concerns: yes. Pt's mother has not worked since pt's liver transplant      Additional Significant Others who will Assist with Transplant:  Name: Miranda Diana- [phone]  Age: 23  Relationship: sister  Does person drive? yes     Name: Sophy Aragon- [phone]  Age: 53  Relationship: aunt  Does person drive? yes     Living Will: no  Healthcare Power of : no Pt reports trusting mother with medical decisions   Advance Directives on file: <Received per medical record.  Verbally reviewed LW/HCPA information.   provided patient with copy of LW/HCPA documents and provided education on completion of forms.    Living Donors: No.    Highest Education Level: Attended College/Technical School. Pt completed 2 years of college. Pt is unsure if he will return to college.   Reading Ability: college   DENIES difficulty with: reading, writing, seeing, hearing, comprehension, learning and memory  Learns Best By:  Multi-modal instruction     Status: no  VA Benefits: no     Working for Income: No  If no, reason not working: Disability. Pt is receiving LTD. Pt reports plans to return to work once medically able     Patient is disabled.  Prior to disability, patient  was employed as  at [employer]. Pt was working full time.     Spouse/Significant Other Employment: N/A    Disabled: yes: date disability began: September 2017, due to: ESLD.    Monthly Income:  Salary/Wages: $0.Pt reports having no income at this time and is  currently being supported by family  Able to afford all costs now and if transplanted, including medications: yes  Patient and Caregiver verbalizes understanding of personal responsibilities related to transplant costs and the importance of having a financial plan to ensure that patients transplant costs are fully covered.      provided fundraising information/education.  Patient and Caregiver verbalizes understanding.   remains available.      Insurance:   Payor/Plan Subscr  Sex Relation Sub. Ins. ID Effective Group Num   1. [place] ELEUTERIO* RAFFY THOMAS 1990 Male  [id number]                                   [address] [address]         Primary Insurance (for UNOS reporting): Public Insurance - Medicare FFS (Fee For Service)  Secondary Insurance (for UNOS reporting): None Pt plans to enroll in employers insurance once pt is cleared to return to work.  Patient and Caregiver verbalizes clear understanding that patient may experience difficulty obtaining and/or be denied insurance coverage post-surgery. This includes and is not limited to disability insurance, life insurance, health insurance, burial insurance, long term care insurance, and other insurances.    Patient and Caregiver also reports understanding that future health concerns related to or unrelated to transplantation may not be covered by patient's insurance.  Resources and information provided and reviewed.      Patient and Caregiver provides verbal permission to release any necessary information to outside resources for patient care and discharge planning.  Resources and information provided are reviewed.      Dialysis Adherence: Pt reports hx of HD for one month while locally. Pt no longer needs dialysis.   Infusion Service: patient utilizing? no  Home Health: patient utilizing? yes, pt is currently enrolled in [facility] for wound care and PT/OT   DME: yes BPC,cane,walker, and wheelchair   Pulmonary/Cardiac Rehab: no  "  ADLS:  Pt reports pt is independent with all ADLS including bathing,walking,taking medications, cooking, housekeeping, eating, and shopping.     Adherence: Pt reports high adherence to all medical recommendations.       Per History Section:  Past Medical History:   Diagnosis Date    Alcoholic cirrhosis 8/14/2018    Alcoholic hepatitis with ascites     Anemia     CKD (chronic kidney disease) stage 3, GFR 30-59 ml/min 8/14/2018    Encounter for blood transfusion     multiple blood transfusions    ESRD on dialysis     History of hematemesis 9/28/2017    Hypertension     Seizures     Substance abuse     alcohol and opioids    Thrombocytopenia     Transplanted liver     10/2017     Social History     Tobacco Use    Smoking status: Former Smoker     Packs/day: 1.00     Years: 10.00     Pack years: 10.00     Types: Cigarettes     Start date: 2/19/2017    Smokeless tobacco: Never Used   Substance Use Topics    Alcohol use: No     Comment: a fifth of liquor daily for years, cut back over last 2 months     Social History     Substance and Sexual Activity   Drug Use No     Social History     Substance and Sexual Activity   Sexual Activity Not on file    Comment: single       SW REAFFIRMED THE PREVIOUSLY REPORTED INFORMATION BELOW     Per Today's Psychosocial:  Tobacco: none, patient denies any use.  Alcohol: none, patient denies any use. Pt reported he was drinking heavily up until about 2 months ago. Pt reported prior to that he was drinking at least one fifth of whisky daily from 3286-5963. Pt reported in 2016 he started an attempt to stop drinking by tapering down his alcohol intake. Pt reported he tapered down to 4-5 "stiff" drinks of whiskey per day. Pt reported by the beginning of 2017 he had tapered down to drinking one glass of wine daily with intention to stop completely. Pt reported he knew his drinking was problematic but was unable to stop completely due to experiencing significant symptoms of " withdrawal including headaches, tremors, and feeling physically ill. Pt reported he was able to successfully stop drinking in the beginning of August 2017 and was able to stay sober on his own for one month. In September 2017 he was hospitalized twice due to sxs of acute liver failure. Pt reported he knew he was sick but was unaware that he was in liver failure until his first hospitalization in September 2017 at [facility] in [place].     Pt reported his alcohol intake increased in 2014 when he moved to [place], [place] and got a job at a [employer]. Pt reported it was stressful and he felt that he was conning people, which he stated he felt guilty about. Pt identified this job as his main trigger for his heavy alcohol use. Pt reported prior to that he drank about 3-4 days per week where he was drinking 3-4 drinks on days that he drank. Pt stated that in college (7930-6199) he was drinking only when he went to parties, which was a few times per month.     Illicit Drugs/Non-prescribed Medications: none, patient denies any use. Pt reported in 2011 after college, he started taking Lortab that was not prescribed to him. Pt reported he received Lortab from an acquaintance. Pt estimated that he took a total of 2.5 scripts, which pt estimated to be about 90 tablets. Pt estimated that this lasted over the course of a few weeks. Pt reported he felt that he was getting addicted to Lortab, so he moved to [place] for a few weeks in order to get away from that crowd. Pt reported he had never returned to use of Lortab after that.     Patient and Caregiver states clear understanding of the potential impact of substance use as it relates to transplant candidacy and is aware of possible random substance screening.  Substance abstinence/cessation counseling, education and resources provided and reviewed.     Arrests/DWI/Treatment/Rehab: yes.   -DUI when pt was 15. Pt reported this was expunged from his record.   - AA. Pt  "reported he went to several AA meetings on his own accord between 6064-7310 in order to help him stop drinking. Pt reported AA was somewhat helpful but he did not like the "Spiritism aspect" of AA. Pt reported when he stopped going he continued to taper himself off alcohol.     Psychiatric History:    Mental Health: anxiety. Pt reported he has suffered from Social anxiety since childhood. Pt reported drinking helped him to socialize without anxiety. Pt reports strong willingness to continue AA meetings and establishing with [facility] once pt returns home. Pt is currently receiving treatment on ABU.     Psychiatrist/Counselor: Yes, pt is treated by Blank Umana [facility] on [place]BU in group therapy. Pt's last progress note by MD states "progressing adequately".   Medications:  None. Pt reported he was put on different SSRI's throughout his life but pt felt that they did not work.   Suicide/Homicide Issues: Pt denied past/current HI/SI   Safety at home: Pt reported he is free from all abuse including but not limited to emotional, physical, and/or sexual abuse.     Knowledge: Patient and Caregiver states having clear understanding and realistic expectations regarding the potential risks and potential benefits of organ transplantation and organ donation, agrees to discuss with health care team members and support system members and to utilize available resources and express questions and/or concerns in order to further facilitate the pt informed decision-making.  Resources and information provided and reviewed.     Patient and Caregiver is aware of [facility]'s affiliation and/or partnership with agencies in home health care, LTAC, SNF, [facility], and other hospitals and clinics.    Understanding: Patient and Caregiver reports having a clear understanding of the many lifetime commitments involved with being a transplant recipient, including costs, compliance, medications, lab work, procedures, appointments, " concrete and financial planning, preparedness, timely and appropriate communication of concerns, abstinence (ETOH, tobacco, illicit non-prescribed drugs), adherence to all health care team recommendations, support system and caregiver involvement, appropriate and timely resource utilization and follow-through, mental health counseling as needed/recommended, and patient and caregiver responsibilities.  Social Service Handbook, resources and detailed educational information provided and reviewed.  Educational information provided.    Patient and Caregiver also reports current and expected compliance with health care regime and states having a clear understanding of the importance of compliance.      Patient and Caregiver reports a clear understanding that risks and benefits may be involved with organ transplantation and with organ donation.      Patient and Caregiver also reports clear understanding that psychosocial risk factors may affect patient, and include but are not limited to feelings of depression, generalized anxiety, anxiety regarding dependence on others, post traumatic stress disorder, feelings of guilt and other emotional and/or mental concerns, and/or exacerbation of existing mental health concerns.  Detailed resources provided and discussed.     Patient and Caregiver agrees to access appropriate resources in a timely manner as needed and/or as recommended, and to communicate concerns appropriately.  Patient and Caregiver also reports a clear understanding of treatment options available.      reviewed education, provided additional information, and answered questions.    Feelings or Concerns: Pt reported that he is hoping for a second chance at life. Pt reported that if he is able to receive a liver transplant, he hopes that it happens soon so that he can start the recovery process and start his new life.     Coping: Pt reported coping adequately with aid of family, friends, positive self  "talk, and breathing exercise. Pt agreed that he can benefit from added mental health counseling to learn more healthy coping skills.     Goals: "Go back to work and live a normal life."  Patient referred to Vocational Rehabilitation.    Interview Behavior: Patient and Caregiver presents as alert and oriented x 4, pleasant, good eye contact, well groomed, recall good, concentration/judgement good, average intelligence, calm, communicative, cooperative and asking and answering questions appropriately.          Transplant Social Work - Candidacy  Assessment/Plan:     Psychosocial Suitability: Patient presents as a fair candidate for kidney transplant or liver transplant at this time. Based on psychosocial risk factors, patient presents as high risk, due to hx of alchol dependence, opioid risk, and minimal financial security. SW reccomends random toxicology screening. Pt did report willingness to continue AA treatment and services at [facility].  Pt has a high potential for relapse which may decrease with continued addiction treatment. Strengths include pt's plan to return to work once medically cleared, good social support, concrete caregiver plan with multiple committed caregivers,no reported hx of tobacco use, and in sustained remission from drug use. Pt was able to successfully taper his drinking on his own accord and recognized that his alcohol use was problematic. Pt reported he was able to successfully remain sober for 1 month prior to being admitted to the hospital and receiving dx of ESLD. Pt verbalized his commitment to sobriety and expressed willingness to engage in formal addiction and mental health treatment.      Recommendations/Additional Comments: EZEKIEL recommends that pt conduct fundraising to assist pt with pay for cost of medications, food, gas, and other transplant related needs. SW recommends that pt remain aware of potential mental health concerns and contact the team if any concerns " arise. SW recommends that pt remain abstinent from tobacco, ETOH, and drug use. SW supports pt's continued dialysis adherence. SW remains available to answer any questions or concerns that arise as the pt moves through the transplant process.     ASHLYN Geiger, LMSW

## 2018-08-17 ENCOUNTER — COMMITTEE REVIEW (OUTPATIENT)
Dept: TRANSPLANT | Facility: CLINIC | Age: 28
End: 2018-08-17

## 2018-08-17 DIAGNOSIS — Z76.82 ORGAN TRANSPLANT CANDIDATE: Primary | ICD-10-CM

## 2018-08-17 NOTE — COMMITTEE REVIEW
Native Organ Dx:       Unable to determine transplant candidacy at this time due to need to clarify current GFR.  Patient will need nuclear med study to get more accurate GFR reading.  Additionally, not sure if patient will qualify for liver safety net at this time.      Note written by: Carroll Roca RN     ===============================================    I was present at the meeting and attest to the decision of the committee

## 2018-08-20 ENCOUNTER — TELEPHONE (OUTPATIENT)
Dept: HEMATOLOGY/ONCOLOGY | Facility: CLINIC | Age: 28
End: 2018-08-20

## 2018-08-20 DIAGNOSIS — Z94.4 LIVER TRANSPLANTED: Primary | ICD-10-CM

## 2018-08-20 NOTE — TELEPHONE ENCOUNTER
Noted. msg sent to pt to let him know Sirolimus script sent to dr morales for approval. Will be sent to [facility] in [place] per pt preference.

## 2018-08-20 NOTE — TELEPHONE ENCOUNTER
Called pt to let him know that script for brucekerry was sent to Dr Morgan for approval. Pt to let coord know what lab he will be using and will get order to do labs this week. Also he had question on whether he should continue the torosemide medication he is on. Will verify with Transplant Pharmacists.

## 2018-08-20 NOTE — TELEPHONE ENCOUNTER
----- Message from Rosamaria Robles PharmD sent at 8/20/2018  4:55 PM CDT -----  Regarding: RE: medication question  Torsemide is just like Furosemide (lasix), please ask his provider/hepatologist if they still want him on this medication.  I believe he will need it from his last hospitalization.      Thanks,   Rosamaria  ----- Message -----  From: Lubna Agustin RN  Sent: 8/20/2018   4:48 PM  To: Abdominal Transplant Pharmacists  Subject: medication question                              Randee,        Mr Diana is on the medication Torsemide. Is there a certain time period that he needs to be on this med? He is about to run out so I did not know if we need to reorder to cancel.    Thanks,  Lubna [facility]

## 2018-08-20 NOTE — TELEPHONE ENCOUNTER
Spoke to pharmacy.  They stated they do not have a patient in their profile with this name, date of birth or address.         ----- Message from Shiela Kline sent at 8/20/2018  2:03 PM CDT -----  Contact: travis from Pharmacy  travis from Pharmacy called to speak with nurse   Callback #[phone]  Thank You  DANIEL kline

## 2018-08-20 NOTE — TELEPHONE ENCOUNTER
----- Message from Jolly Rosario sent at 8/20/2018  3:11 PM CDT -----  Contact: Patient  Needs Advice    Reason for call:   Pt need a new rx for his sirolimus (RAPAMUNE) 1 MG Tab medication. Pt states he take 2 tablets a day but his prescription reads 1 tablet for 30 days. Pt will run out medication before the month is over. Pt uses [facility] in [place], [place] [phone]  Communication Preference: [phone]  Additional Information: n/a

## 2018-08-21 ENCOUNTER — TELEPHONE (OUTPATIENT)
Dept: TRANSPLANT | Facility: CLINIC | Age: 28
End: 2018-08-21

## 2018-08-21 ENCOUNTER — PATIENT MESSAGE (OUTPATIENT)
Dept: TRANSPLANT | Facility: CLINIC | Age: 28
End: 2018-08-21

## 2018-08-21 RX ORDER — SIROLIMUS 1 MG/1
2 TABLET, FILM COATED ORAL DAILY
Qty: 60 TABLET | Refills: 12 | Status: SHIPPED | OUTPATIENT
Start: 2018-08-21 | End: 2018-11-07 | Stop reason: DRUGHIGH

## 2018-08-21 RX ORDER — TORSEMIDE 20 MG/1
60 TABLET ORAL DAILY
Qty: 90 TABLET | Refills: 3 | Status: SHIPPED | OUTPATIENT
Start: 2018-08-21 | End: 2019-04-12

## 2018-08-21 NOTE — TELEPHONE ENCOUNTER
Ramo Lubna,     There's a [facility] at [address] [place] [address] [address] [phone].  Can we be sure to schedule labs for a weekday during late morning to midday?  My ride there is dependent on my friend, and she works usually starting around 3-5pm, so it needs to be done during that time.  Only thing is I think I might owe them money from a really long time ago and I don't remember if I paid them.  Oops.  I called but they're not answering their phone so I left a message and am still waiting on them to call me back to confirm if there'll be any issue with using them, but for now I guess we'll go ahead and set up with them.  Worst case scenario is I can go to [facility] instead, their info is [address] [facility] [address] [phone].     Thanks,     Jhonny

## 2018-08-22 ENCOUNTER — NURSE TRIAGE (OUTPATIENT)
Dept: ADMINISTRATIVE | Facility: CLINIC | Age: 28
End: 2018-08-22

## 2018-08-22 PROBLEM — A41.9 SEPSIS: Status: ACTIVE | Noted: 2018-08-22

## 2018-08-22 NOTE — TELEPHONE ENCOUNTER
"Liver transplant from 10/19/2017 and is also a kidney referral. Temp 100.8 taken about 10 min ago. Also having abdominal pain on right side. Rates 5/10 Pain is just below incision. Not sure if related. Has had abdominal discomfort for a few days and is worse with movement.    Reason for Disposition   Transplant patient (e.g., kidney, liver, lung, heart)    Answer Assessment - Initial Assessment Questions  1. TEMPERATURE: "What is the most recent temperature?"  "How was it measured?"       100.8  2. ONSET: "When did the fever start?"       Just now  3. SYMPTOMS: "Do you have any other symptoms besides the fever?"  (e.g., colds, headache, sore throat, earache, cough, rash, diarrhea, vomiting, abdominal pain)      Abdominal pain as noted  4. CAUSE: If there are no symptoms, ask: "What do you think is causing the fever?"       As noted  5. CONTACTS: "Does anyone else in the family have an infection?"      no  6. TREATMENT: "What have you done so far to treat this fever?" (e.g., medications)      No has not done anything  7. IMMUNOCOMPROMISE: "Do you have of the following: diabetes, HIV positive, splenectomy, cancer chemotherapy, chronic steroid treatment, transplant patient, etc."      Liver transplant  8. PREGNANCY: "Is there any chance you are pregnant?" "When was your last menstrual period?"      no  9. TRAVEL: "Have you traveled out of the country in the last month?" (e.g., travel history, exposures)      no    Protocols used: ST FEVER-A-      "

## 2018-08-22 NOTE — TELEPHONE ENCOUNTER
Pt with fever and abdominal pain. Advised to nearest ED in [place]. Advised no antipyretics and notify ED that he is a transplant recipient.  Verbalized understanding.

## 2018-08-23 ENCOUNTER — TELEPHONE (OUTPATIENT)
Dept: TRANSPLANT | Facility: CLINIC | Age: 28
End: 2018-08-23

## 2018-08-23 NOTE — TELEPHONE ENCOUNTER
Pt did not complete labs this week. He went to his local Er yesterday for abd pain. Have left a  for a return call to get labs scheduled.

## 2018-08-27 ENCOUNTER — OFFICE VISIT (OUTPATIENT)
Dept: TRANSPLANT | Facility: CLINIC | Age: 28
End: 2018-08-27
Payer: COMMERCIAL

## 2018-08-27 ENCOUNTER — CLINICAL SUPPORT (OUTPATIENT)
Dept: INFECTIOUS DISEASES | Facility: CLINIC | Age: 28
End: 2018-08-27
Payer: COMMERCIAL

## 2018-08-27 ENCOUNTER — TELEPHONE (OUTPATIENT)
Dept: TRANSPLANT | Facility: CLINIC | Age: 28
End: 2018-08-27

## 2018-08-27 ENCOUNTER — OFFICE VISIT (OUTPATIENT)
Dept: INFECTIOUS DISEASES | Facility: CLINIC | Age: 28
End: 2018-08-27
Payer: COMMERCIAL

## 2018-08-27 VITALS
HEIGHT: 66 IN | OXYGEN SATURATION: 100 % | WEIGHT: 140.88 LBS | TEMPERATURE: 99 F | SYSTOLIC BLOOD PRESSURE: 105 MMHG | RESPIRATION RATE: 16 BRPM | DIASTOLIC BLOOD PRESSURE: 60 MMHG | BODY MASS INDEX: 22.64 KG/M2 | HEART RATE: 83 BPM

## 2018-08-27 VITALS
TEMPERATURE: 99 F | HEART RATE: 82 BPM | WEIGHT: 141.56 LBS | DIASTOLIC BLOOD PRESSURE: 59 MMHG | HEIGHT: 66 IN | SYSTOLIC BLOOD PRESSURE: 107 MMHG | BODY MASS INDEX: 22.75 KG/M2

## 2018-08-27 DIAGNOSIS — Z23 NEED FOR PNEUMOCOCCAL VACCINATION: ICD-10-CM

## 2018-08-27 DIAGNOSIS — Z79.60 LONG-TERM USE OF IMMUNOSUPPRESSANT MEDICATION: Chronic | ICD-10-CM

## 2018-08-27 DIAGNOSIS — N18.5 ANEMIA OF CHRONIC RENAL FAILURE, STAGE 5: ICD-10-CM

## 2018-08-27 DIAGNOSIS — Z48.23 ENCOUNTER FOR AFTERCARE FOLLOWING LIVER TRANSPLANT: Primary | ICD-10-CM

## 2018-08-27 DIAGNOSIS — Z29.89 PROPHYLACTIC IMMUNOTHERAPY: Chronic | ICD-10-CM

## 2018-08-27 DIAGNOSIS — D63.1 ANEMIA OF CHRONIC RENAL FAILURE, STAGE 5: ICD-10-CM

## 2018-08-27 DIAGNOSIS — Z94.4 S/P LIVER TRANSPLANT: Chronic | ICD-10-CM

## 2018-08-27 DIAGNOSIS — Z94.4 S/P LIVER TRANSPLANT: Primary | Chronic | ICD-10-CM

## 2018-08-27 DIAGNOSIS — T86.49 BILIARY STRICTURE OF TRANSPLANTED LIVER: ICD-10-CM

## 2018-08-27 DIAGNOSIS — E44.1 MILD PROTEIN-CALORIE MALNUTRITION: ICD-10-CM

## 2018-08-27 DIAGNOSIS — Z76.82 ORGAN TRANSPLANT CANDIDATE: ICD-10-CM

## 2018-08-27 DIAGNOSIS — K83.1 BILIARY STRICTURE OF TRANSPLANTED LIVER: ICD-10-CM

## 2018-08-27 DIAGNOSIS — Z71.85 VACCINE COUNSELING: ICD-10-CM

## 2018-08-27 DIAGNOSIS — N18.30 CKD (CHRONIC KIDNEY DISEASE) STAGE 3, GFR 30-59 ML/MIN: ICD-10-CM

## 2018-08-27 DIAGNOSIS — D61.818 OTHER PANCYTOPENIA: ICD-10-CM

## 2018-08-27 DIAGNOSIS — Z23 NEED FOR HEPATITIS A AND B VACCINATION: ICD-10-CM

## 2018-08-27 PROBLEM — K70.31 ASCITES DUE TO ALCOHOLIC CIRRHOSIS: Status: RESOLVED | Noted: 2017-11-14 | Resolved: 2018-08-27

## 2018-08-27 PROBLEM — A41.9 SEPSIS: Status: RESOLVED | Noted: 2018-08-22 | Resolved: 2018-08-27

## 2018-08-27 PROBLEM — K70.30 ALCOHOLIC CIRRHOSIS: Status: RESOLVED | Noted: 2018-08-14 | Resolved: 2018-08-27

## 2018-08-27 PROBLEM — L03.119 CELLULITIS, LEG: Status: RESOLVED | Noted: 2018-07-31 | Resolved: 2018-08-27

## 2018-08-27 PROCEDURE — 90471 IMMUNIZATION ADMIN: CPT | Mod: S$GLB,TXP,, | Performed by: INTERNAL MEDICINE

## 2018-08-27 PROCEDURE — 90632 HEPA VACCINE ADULT IM: CPT | Mod: S$GLB,TXP,, | Performed by: INTERNAL MEDICINE

## 2018-08-27 PROCEDURE — 99215 OFFICE O/P EST HI 40 MIN: CPT | Mod: S$GLB,,, | Performed by: INTERNAL MEDICINE

## 2018-08-27 PROCEDURE — 99214 OFFICE O/P EST MOD 30 MIN: CPT | Mod: 25,S$GLB,, | Performed by: INTERNAL MEDICINE

## 2018-08-27 PROCEDURE — 99999 PR PBB SHADOW E&M-EST. PATIENT-LVL IV: CPT | Mod: PBBFAC,TXP,, | Performed by: INTERNAL MEDICINE

## 2018-08-27 PROCEDURE — 90472 IMMUNIZATION ADMIN EACH ADD: CPT | Mod: S$GLB,TXP,, | Performed by: INTERNAL MEDICINE

## 2018-08-27 PROCEDURE — 99999 PR PBB SHADOW E&M-EST. PATIENT-LVL III: CPT | Mod: PBBFAC,TXP,, | Performed by: INTERNAL MEDICINE

## 2018-08-27 PROCEDURE — 90732 PPSV23 VACC 2 YRS+ SUBQ/IM: CPT | Mod: S$GLB,TXP,, | Performed by: INTERNAL MEDICINE

## 2018-08-27 NOTE — TELEPHONE ENCOUNTER
Called pt and told him that we need to have him tell us which lab he will be using in [place]. He stated [facility]. Will fax orders over to them. He will complete labs today here at [facility]. Will review with Dr Rogers that pt takes meds at 12noon and 12 midnight. Need to verify that this is okay/

## 2018-08-27 NOTE — LETTER
August 29, 2018        Michael Peterson  [address] [facility] [address]  Phone: [phone]  Fax: [phone]             [facility] - [facility] Transplant  [address]  [facility] [address]  Phone: [phone]   Patient: Jhonny Diana   MR Number: [id number]   YOB: 1990   Date of Visit: 8/27/2018       Dear Dr. Michael Peterson    Thank you for referring Jhonny Diana to me for evaluation. Attached you will find relevant portions of my assessment and plan of care.    If you have questions, please do not hesitate to call me. I look forward to following Jhonny Daina along with you.    Sincerely,    Georgina Rogers MD    Enclosure    If you would like to receive this communication electronically, please contact [email or web address] or [phone] to request [facility] Link access.    [facility] Link is a tool which provides read-only access to select patient information with whom you have a relationship. Its easy to use and provides real time access to review your patients record including encounter summaries, notes, results, and demographic information.    If you feel you have received this communication in error or would no longer like to receive these types of communications, please e-mail [email or web address]

## 2018-08-27 NOTE — PATIENT INSTRUCTIONS
- will check iron level  - will give Epo 20,000 unit once a month  - continue same medications  - return in 1 month

## 2018-08-27 NOTE — PROGRESS NOTES
Pt received the Pneumovax 23 and the hepatitis A vaccination. Pt tolerated the injection well. Pt updated that the Hepatitis B high dose vaccination is currently out of stock. Pt added to the wait list. Will notify pt once the vaccination is back in stock. Pt left the unit in NAD.

## 2018-08-29 ENCOUNTER — PATIENT MESSAGE (OUTPATIENT)
Dept: TRANSPLANT | Facility: CLINIC | Age: 28
End: 2018-08-29

## 2018-08-30 ENCOUNTER — TELEPHONE (OUTPATIENT)
Dept: TRANSPLANT | Facility: CLINIC | Age: 28
End: 2018-08-30

## 2018-08-30 NOTE — TELEPHONE ENCOUNTER
Returned call to pt asked about what type of work he does so a return to work letter could be completed.

## 2018-08-30 NOTE — TELEPHONE ENCOUNTER
Called pt and left a VM for pt to return call to verify what type of work he does. He is asking for a return to work letter.

## 2018-08-30 NOTE — TELEPHONE ENCOUNTER
----- Message from Fco Santos sent at 8/30/2018 11:19 AM CDT -----  Contact: patient   Patient returning a call from Lubna           Please call [phone]      Thanks!

## 2018-08-31 ENCOUNTER — TELEPHONE (OUTPATIENT)
Dept: TRANSPLANT | Facility: CLINIC | Age: 28
End: 2018-08-31

## 2018-08-31 DIAGNOSIS — Z76.82 ORGAN TRANSPLANT CANDIDATE: Primary | ICD-10-CM

## 2018-09-03 PROBLEM — R10.9 ABDOMINAL PAIN: Status: RESOLVED | Noted: 2018-05-27 | Resolved: 2018-09-03

## 2018-09-03 NOTE — PROGRESS NOTES
Transplant Hepatology  Liver Transplant Recipient Follow-up    Transplant Date: 10/19/2017  UNOS Native Liver Dx: Acute Alcoholic Hepatitis    Mr. Diana is here for follow up of his liver transplant.  He is accompanied by his mother.      ORGAN: LIVER  Whole or Partial: whole liver  Donor Type:  - brain death  CDC High Risk: no  Donor CMV Status: Positive  Donor HCV Status: Negative  Donor HBcAb: Negative  Biliary Anastomosis: end to end  Arterial Anatomy: standard  IVC reconstruction: end to end ivc  Portal vein status: patent      Subjective:     Interval History:   Patient is doing well. He has completed ABU - the last date is 18. He has moved back to [place], [place], planning to start a job. He takes am labs at 11 am due to his schedule, but states that  He has been compliant. Had a fever, went to local ER, now resolved and feeling well.    Last HD was 18 . He is gaining weight. He feels stronger. He does not have to use a cane for support.     IS is currently sirolimus 1 mg daily.  He notes increase in urine output.       Review of Systems   Constitutional: Positive for fatigue. Negative for activity change, appetite change, chills and unexpected weight change.   HENT: Negative for hearing loss and sore throat.    Eyes: Negative for visual disturbance.   Respiratory: Negative for shortness of breath.    Cardiovascular: Negative for chest pain and leg swelling.   Gastrointestinal: Negative for abdominal distention, abdominal pain, nausea and vomiting.   Musculoskeletal: Negative for gait problem.   Skin: Negative for rash.   Neurological: Negative for weakness and headaches.   Hematological: Negative for adenopathy. Does not bruise/bleed easily.   Psychiatric/Behavioral: Negative for confusion.       Objective:     Physical Exam   Constitutional: He is oriented to person, place, and time. He appears well-developed and well-nourished. No distress.   HENT:   Head: Normocephalic and atraumatic.    Mouth/Throat: Oropharynx is clear and moist.   Eyes: Conjunctivae are normal. Pupils are equal, round, and reactive to light. No scleral icterus.   Neck: Normal range of motion. Neck supple. No thyromegaly present.   Cardiovascular: Normal rate, regular rhythm and normal heart sounds. Exam reveals no gallop and no friction rub.   No murmur heard.  Pulmonary/Chest: Effort normal and breath sounds normal. No respiratory distress. He has no wheezes. He has no rales.   Abdominal: Soft. Bowel sounds are normal. He exhibits no distension. There is no tenderness. There is no guarding.   Well healed Chevron incision   Musculoskeletal: Normal range of motion. He exhibits no edema (1+ BLE ).   Lymphadenopathy:     He has no cervical adenopathy.   Neurological: He is alert and oriented to person, place, and time. No cranial nerve deficit.   Skin: Skin is warm and dry. No erythema.   Psychiatric: He has a normal mood and affect. His behavior is normal.   Vitals reviewed.    Lab Results   Component Value Date    BILITOT 0.8 08/27/2018    AST 35 08/27/2018    ALT 23 08/27/2018    ALKPHOS 146 (H) 08/27/2018    CREATININE 2.5 (H) 08/27/2018    ALBUMIN 3.3 (L) 08/27/2018     Lab Results   Component Value Date    WBC 3.18 (L) 08/27/2018    HGB 7.7 (L) 08/27/2018    HCT 21.6 (L) 08/27/2018    HCT 28 (L) 10/20/2017    PLT 44 (L) 08/27/2018     Lab Results   Component Value Date    TACROLIMUS <1.5 (L) 03/04/2018    CYCLOSPORINE <10 (L) 06/02/2018    SIROLIMUSLEV 8.7 08/27/2018       Assessment/Plan:   27yo male s/p OLT with complicated course including abnormal liver tests, volume overload, renal insufficiency, biliary anastomotic strictures, thrombocytopenia and chronic abdominal pain.    Patient has been doing well. Abd pain is controlled on suboxome, managed by Addiction Psychiatry.  Patient does not have seizure disorder, his seizure activities after liver transplant by medication-induced. We will stop his  leviteracetam.    Recommendation(s):  - will check iron level  - will give Epo 20,000 unit once a month  - continue same medications  - return in 6 weeks    A total of 40 minutes were spent face-to-face with the patient during this encounter and over half of that time was spent on counseling and coordination of care.  We discussed in depth the nature of the patient's immunocompromised status, liver graft function, immunosuppression, preventative measures and the management plan in details. I also educated the patient about lifestyle modifications which may improve hepatic steatosis, overweight/obesity, insulin resistance and high blood pressure issues. I have provided the patient with an opportunity to ask questions and have all questions answered to his satisfaction.     Georgina Rogers MD         [facility] Patient Status  Functional Status: 80% - Normal activity with effort: some symptoms of disease  Physical Capacity: Limited Mobility

## 2018-09-05 LAB — FUNGUS SPEC CULT: NORMAL

## 2018-09-07 ENCOUNTER — TELEPHONE (OUTPATIENT)
Dept: TRANSPLANT | Facility: CLINIC | Age: 28
End: 2018-09-07

## 2018-09-07 ENCOUNTER — PATIENT MESSAGE (OUTPATIENT)
Dept: TRANSPLANT | Facility: CLINIC | Age: 28
End: 2018-09-07

## 2018-09-07 NOTE — TELEPHONE ENCOUNTER
Having continued issues getting pt to complete labwork.    Hi Mr Diana,        We only fax the standing labs to the lab, you are responsible for making an appointment if required. Some labs are walk in. I would recommend calling them or going to the lab to set up. Fridays are fine. I was checking to see if you did labs today. Remember it needs to be scheduled 12 hours after your night dose so we can get an accurate drug level. If you are having difficulties with this lab yaniv we can have you do labs at [facility]. I have also mailed a packet to the Nephrology Specialists at [facility] to get you an appointment to establish with them to manage/monitor your kidney function. You can email the papers to me at [email or web address] or mail them to:    [facility]  3rd floor [address] [place] [address]  *ATTN: Lubna Agustin, SISSY    Thanks,  SISSY Calvo

## 2018-09-10 ENCOUNTER — TELEPHONE (OUTPATIENT)
Dept: TRANSPLANT | Facility: CLINIC | Age: 28
End: 2018-09-10

## 2018-09-10 NOTE — TELEPHONE ENCOUNTER
Noted. Having continued issues of getting pt to complete labs. Pt states he will go 9/14/18. He states has ride issues and works during the day. Coord reminded pt that these labs are very important and it allows us to keep a close eye on his labs so we can recognize if he has any issues. Will let Dr Rogers know.

## 2018-09-10 NOTE — TELEPHONE ENCOUNTER
----- Message from Georgina Rogers MD sent at 8/27/2018  4:18 PM CDT -----  Saw the patient in clinic, doing well, off suboxome, denies GI bleed sign/symptoms. Sirolimus pending. Hb: 7.7 - not sure why, not on MMF. He has not received Epo since June, his renal function is still not excellent. We need to figure out how we can arrange his Epo 20,000 units SQ x 1. Please check iron/TIBC with next labs as well. He has a cardiac stress next month, we can cancel that as he does not need KTM eval now (per Dr. Craig and Dr. Smiley).

## 2018-09-10 NOTE — TELEPHONE ENCOUNTER
----- Message from Georgina Rogers MD sent at 9/10/2018  3:28 PM CDT -----  Regarding: RE: Pt issue  Ok noted. Thank you for letting me know and for documenting on the chart.   NL  ----- Message -----  From: Lubna Agustin RN  Sent: 9/10/2018   3:01 PM  To: Georgina Rogers MD  Subject: Pt issue                                         Hi Dr Rogers,       I am having continued issues of pt not doing labs. He has not completed any since he left [place]. He states he has ride issues and works during the week and will only do them on Fridays. I reemphasized that we need him to complete labs because we need to watch his labs for any abnormalities. He stated he would go this Friday.    SISSY Calvo  [facility]

## 2018-09-13 ENCOUNTER — TELEPHONE (OUTPATIENT)
Dept: TRANSPLANT | Facility: CLINIC | Age: 28
End: 2018-09-13

## 2018-09-13 NOTE — TELEPHONE ENCOUNTER
"Returned call patient's mother stated, " patient was having a cold and wanted to know what he could take?"  I advised her of the list on page 125 of the transplant book.  She did ask if he could take chlorpheneramine maliate, I asked Anoop CARDONA if this would be ok and he cleared it.  I called her back to let her know this would be ok, and his lab order was sent for tomorrow.  "

## 2018-09-13 NOTE — TELEPHONE ENCOUNTER
----- Message from Sandi Saravia sent at 9/13/2018 12:16 PM CDT -----  Contact: Pt mother-Deborah Diana  Needs Advice    Reason for call: Calling to speak with Lubna in regards to seeing what the pt can take due to him having a cold, she also stated she would like to know if the lab has been scheduled for him as well.  Communication Preference: [phone]  Additional Information: N/A

## 2018-09-17 ENCOUNTER — TELEPHONE (OUTPATIENT)
Dept: TRANSPLANT | Facility: CLINIC | Age: 28
End: 2018-09-17

## 2018-09-17 LAB
EXT ALBUMIN: 4.1
EXT ALKALINE PHOSPHATASE: 148
EXT ALT: 13
EXT AST: 33
EXT BASOPHIL%: 1
EXT BILIRUBIN DIRECT: 0.1 MG/DL
EXT BILIRUBIN TOTAL: 0.2
EXT BUN: 19
EXT CALCIUM: 8.8
EXT CHLORIDE: 108
EXT CO2: 16
EXT CREATININE: 1.74 MG/DL
EXT EOSINOPHIL%: 5
EXT GLUCOSE: 80
EXT HEMATOCRIT: 21.2
EXT HEMOGLOBIN: 7.3
EXT LYMPH%: 32
EXT MONOCYTES%: 9
EXT PLATELETS: 48
EXT POTASSIUM: 4.3
EXT PROTEIN TOTAL: 7
EXT SEGS%: 53
EXT SODIUM: 141 MMOL/L
EXT TACROLIMUS LVL: NORMAL
EXT WBC: 2.4
FERRITIN: 1444
IRON SATN MFR SERPL: 31 %
IRON SERPL-MCNC: 89 UG/DL
TIBC SERPL-MCNC: 287 UG/DL
UIBC SERPL-MCNC: 198 UG/DL

## 2018-09-17 NOTE — TELEPHONE ENCOUNTER
Received labs from [facility] this am. Pending review from Dr Rogers. Will call pt with lab review once review received.

## 2018-09-17 NOTE — TELEPHONE ENCOUNTER
----- Message from Jolly Rosario sent at 9/17/2018  1:28 PM CDT -----  Contact: Patient  Needs Advice    Reason for call: wishes to discuss labs        Communication Preference: [phone]    Additional Information: n/a

## 2018-09-17 NOTE — TELEPHONE ENCOUNTER
Called pt to see if he completed labs like previously discussed. Left a VM on his phone asking for a return call asap.

## 2018-09-17 NOTE — TELEPHONE ENCOUNTER
----- Message from Jolly Rosario sent at 9/17/2018  1:02 PM CDT -----  Contact: Patient  FYI    Reason for call: Called to inform coordinator that he had his labs done Friday.        Communication Preference: [phone]    Additional Information: n/a

## 2018-09-18 LAB
ALBUMIN SERPL-MCNC: 4.1 G/DL (ref 3.5–5.5)
ALBUMIN/GLOB SERPL: 1.4 {RATIO} (ref 1.2–2.2)
ALP SERPL-CCNC: 148 IU/L (ref 39–117)
ALT SERPL-CCNC: 13 IU/L (ref 0–44)
AMBIG ABBREV CMP 14 DEFAULT: NORMAL
AST SERPL-CCNC: 33 IU/L (ref 0–40)
BASOPHILS # BLD AUTO: 0 X10E3/UL (ref 0–0.2)
BASOPHILS NFR BLD AUTO: 1 %
BILIRUB DIRECT SERPL-MCNC: 0.1 MG/DL (ref 0–0.4)
BILIRUB SERPL-MCNC: 0.2 MG/DL (ref 0–1.2)
BUN SERPL-MCNC: 19 MG/DL (ref 6–20)
BUN/CREAT SERPL: 11 (ref 9–20)
CALCIUM SERPL-MCNC: 8.8 MG/DL (ref 8.7–10.2)
CHLORIDE SERPL-SCNC: 108 MMOL/L (ref 96–106)
CO2 SERPL-SCNC: 16 MMOL/L (ref 20–29)
CREAT SERPL-MCNC: 1.74 MG/DL (ref 0.76–1.27)
EGFR IF AFRICAN AMERICAN: 60 ML/MIN/1.73
EOSINOPHIL # BLD AUTO: 0.1 X10E3/UL (ref 0–0.4)
EOSINOPHIL NFR BLD AUTO: 5 %
ERYTHROCYTE [DISTWIDTH] IN BLOOD BY AUTOMATED COUNT: 14.8 % (ref 12.3–15.4)
EST. GFR  (NON AFRICAN AMERICAN): 52 ML/MIN/1.73
FERRITIN SERPL-MCNC: 1444 NG/ML (ref 30–400)
GLOBULIN SER CALC-MCNC: 2.9 G/DL (ref 1.5–4.5)
GLUCOSE SERPL-MCNC: 80 MG/DL (ref 65–99)
HCT VFR BLD AUTO: 21.2 % (ref 37.5–51)
HGB BLD-MCNC: 7.3 G/DL (ref 13–17.7)
IMM GRANULOCYTES # BLD: 0 X10E3/UL (ref 0–0.1)
IMM GRANULOCYTES NFR BLD: 0 %
IRON SATN MFR SERPL: 31 % (ref 15–55)
IRON SERPL-MCNC: 89 UG/DL (ref 38–169)
LYMPHOCYTES # BLD AUTO: 0.8 X10E3/UL (ref 0.7–3.1)
LYMPHOCYTES NFR BLD AUTO: 32 %
MCH RBC QN AUTO: 27.7 PG (ref 26.6–33)
MCHC RBC AUTO-ENTMCNC: 34.4 G/DL (ref 31.5–35.7)
MCV RBC AUTO: 80 FL (ref 79–97)
MONOCYTES # BLD AUTO: 0.2 X10E3/UL (ref 0.1–0.9)
MONOCYTES NFR BLD AUTO: 9 %
MORPHOLOGY BLD-IMP: ABNORMAL
NEUTROPHILS # BLD AUTO: 1.3 X10E3/UL (ref 1.4–7)
NEUTROPHILS NFR BLD AUTO: 53 %
PLATELET # BLD AUTO: 48 X10E3/UL (ref 150–379)
POTASSIUM SERPL-SCNC: 4.3 MMOL/L (ref 3.5–5.2)
PROT SERPL-MCNC: 7 G/DL (ref 6–8.5)
RBC # BLD AUTO: 2.64 X10E6/UL (ref 4.14–5.8)
SIROLIMUS BLD-MCNC: 3.4 NG/ML (ref 3–20)
SODIUM SERPL-SCNC: 141 MMOL/L (ref 134–144)
TIBC SERPL-MCNC: 287 UG/DL (ref 250–450)
UIBC SERPL-MCNC: 198 UG/DL (ref 111–343)
WBC # BLD AUTO: 2.4 X10E3/UL (ref 3.4–10.8)

## 2018-09-19 ENCOUNTER — PATIENT MESSAGE (OUTPATIENT)
Dept: TRANSPLANT | Facility: CLINIC | Age: 28
End: 2018-09-19

## 2018-09-20 ENCOUNTER — TELEPHONE (OUTPATIENT)
Dept: TRANSPLANT | Facility: CLINIC | Age: 28
End: 2018-09-20

## 2018-09-20 NOTE — TELEPHONE ENCOUNTER
Reviewed labs with pt. He states he will make an appt with his PCP regarding low Hgb. States feel well, does have a cold, but no fevers. Pt will be here next Thursday. Will schedule labs for that day per pt req, 9/27

## 2018-09-20 NOTE — TELEPHONE ENCOUNTER
----- Message from Georgina Rogers MD sent at 9/17/2018  2:03 PM CDT -----  Labs stable. Iron: 89 normal. Ferritin: trending down. His renal function is improving well, Cr:. 1.7. His FK level is still pending. Please notify me when FK level is back. Thank you.

## 2018-09-20 NOTE — TELEPHONE ENCOUNTER
----- Message from Georgina Rogers MD sent at 9/18/2018  1:17 PM CDT -----  Sirolimus: therapeutic, liver tests stable, Cr: 1.74 and improving. Hb: 7.3. Please advise to see his PCP in [place] to arrange for pRBC transfusion. Iron is normal.

## 2018-09-27 ENCOUNTER — OFFICE VISIT (OUTPATIENT)
Dept: TRANSPLANT | Facility: CLINIC | Age: 28
End: 2018-09-27
Payer: COMMERCIAL

## 2018-09-27 ENCOUNTER — LAB VISIT (OUTPATIENT)
Dept: LAB | Facility: HOSPITAL | Age: 28
End: 2018-09-27
Attending: INTERNAL MEDICINE
Payer: COMMERCIAL

## 2018-09-27 ENCOUNTER — OFFICE VISIT (OUTPATIENT)
Dept: HEMATOLOGY/ONCOLOGY | Facility: CLINIC | Age: 28
End: 2018-09-27
Payer: COMMERCIAL

## 2018-09-27 ENCOUNTER — OFFICE VISIT (OUTPATIENT)
Dept: TRANSPLANT | Facility: CLINIC | Age: 28
End: 2018-09-27
Attending: INTERNAL MEDICINE
Payer: COMMERCIAL

## 2018-09-27 VITALS
DIASTOLIC BLOOD PRESSURE: 76 MMHG | WEIGHT: 143.75 LBS | RESPIRATION RATE: 18 BRPM | OXYGEN SATURATION: 100 % | HEIGHT: 66 IN | BODY MASS INDEX: 23.1 KG/M2 | HEART RATE: 86 BPM | TEMPERATURE: 99 F | SYSTOLIC BLOOD PRESSURE: 122 MMHG

## 2018-09-27 VITALS
BODY MASS INDEX: 23.1 KG/M2 | RESPIRATION RATE: 18 BRPM | HEART RATE: 86 BPM | DIASTOLIC BLOOD PRESSURE: 76 MMHG | SYSTOLIC BLOOD PRESSURE: 122 MMHG | OXYGEN SATURATION: 100 % | TEMPERATURE: 99 F | HEIGHT: 66 IN | WEIGHT: 143.75 LBS

## 2018-09-27 VITALS
BODY MASS INDEX: 23.17 KG/M2 | HEIGHT: 66 IN | TEMPERATURE: 98 F | SYSTOLIC BLOOD PRESSURE: 119 MMHG | WEIGHT: 144.19 LBS | HEART RATE: 85 BPM | RESPIRATION RATE: 20 BRPM | OXYGEN SATURATION: 100 % | DIASTOLIC BLOOD PRESSURE: 58 MMHG

## 2018-09-27 DIAGNOSIS — D63.1 ANEMIA OF CHRONIC RENAL FAILURE, STAGE 3 (MODERATE): ICD-10-CM

## 2018-09-27 DIAGNOSIS — Z29.89 PROPHYLACTIC IMMUNOTHERAPY: Chronic | ICD-10-CM

## 2018-09-27 DIAGNOSIS — D69.6 THROMBOCYTOPENIA: ICD-10-CM

## 2018-09-27 DIAGNOSIS — D72.810 LYMPHOCYTOPENIA: ICD-10-CM

## 2018-09-27 DIAGNOSIS — Z94.4 S/P LIVER TRANSPLANT: Chronic | ICD-10-CM

## 2018-09-27 DIAGNOSIS — Z94.4 STATUS POST LIVER TRANSPLANT: ICD-10-CM

## 2018-09-27 DIAGNOSIS — Z94.4 S/P LIVER TRANSPLANT: Primary | Chronic | ICD-10-CM

## 2018-09-27 DIAGNOSIS — D69.6 THROMBOCYTOPENIA: Primary | ICD-10-CM

## 2018-09-27 DIAGNOSIS — Z79.60 LONG-TERM USE OF IMMUNOSUPPRESSANT MEDICATION: Chronic | ICD-10-CM

## 2018-09-27 DIAGNOSIS — N18.30 ANEMIA OF CHRONIC RENAL FAILURE, STAGE 3 (MODERATE): ICD-10-CM

## 2018-09-27 DIAGNOSIS — Z94.4 LIVER TRANSPLANTED: ICD-10-CM

## 2018-09-27 DIAGNOSIS — Z48.23 ENCOUNTER FOR AFTERCARE FOLLOWING LIVER TRANSPLANT: Primary | ICD-10-CM

## 2018-09-27 LAB
ALBUMIN SERPL BCP-MCNC: 3.4 G/DL
ALP SERPL-CCNC: 186 U/L
ALT SERPL W/O P-5'-P-CCNC: 36 U/L
ANION GAP SERPL CALC-SCNC: 8 MMOL/L
AST SERPL-CCNC: 43 U/L
BASOPHILS # BLD AUTO: 0.01 K/UL
BASOPHILS # BLD AUTO: 0.01 K/UL
BASOPHILS NFR BLD: 0.4 %
BASOPHILS NFR BLD: 0.4 %
BILIRUB DIRECT SERPL-MCNC: 0.2 MG/DL
BILIRUB SERPL-MCNC: 0.3 MG/DL
BUN SERPL-MCNC: 19 MG/DL
CALCIUM SERPL-MCNC: 9.2 MG/DL
CHLORIDE SERPL-SCNC: 112 MMOL/L
CO2 SERPL-SCNC: 21 MMOL/L
CREAT SERPL-MCNC: 1.7 MG/DL
DIFFERENTIAL METHOD: ABNORMAL
DIFFERENTIAL METHOD: ABNORMAL
EOSINOPHIL # BLD AUTO: 0.1 K/UL
EOSINOPHIL # BLD AUTO: 0.1 K/UL
EOSINOPHIL NFR BLD: 5.2 %
EOSINOPHIL NFR BLD: 5.2 %
ERYTHROCYTE [DISTWIDTH] IN BLOOD BY AUTOMATED COUNT: 14.2 %
ERYTHROCYTE [DISTWIDTH] IN BLOOD BY AUTOMATED COUNT: 14.2 %
EST. GFR  (AFRICAN AMERICAN): >60 ML/MIN/1.73 M^2
EST. GFR  (NON AFRICAN AMERICAN): 53.3 ML/MIN/1.73 M^2
GLUCOSE SERPL-MCNC: 91 MG/DL
HCT VFR BLD AUTO: 21.5 %
HCT VFR BLD AUTO: 21.5 %
HGB BLD-MCNC: 7.3 G/DL
HGB BLD-MCNC: 7.3 G/DL
IMM GRANULOCYTES # BLD AUTO: 0.01 K/UL
IMM GRANULOCYTES # BLD AUTO: 0.01 K/UL
IMM GRANULOCYTES NFR BLD AUTO: 0.4 %
IMM GRANULOCYTES NFR BLD AUTO: 0.4 %
LYMPHOCYTES # BLD AUTO: 0.7 K/UL
LYMPHOCYTES # BLD AUTO: 0.7 K/UL
LYMPHOCYTES NFR BLD: 26.7 %
LYMPHOCYTES NFR BLD: 26.7 %
MCH RBC QN AUTO: 29.2 PG
MCH RBC QN AUTO: 29.2 PG
MCHC RBC AUTO-ENTMCNC: 34 G/DL
MCHC RBC AUTO-ENTMCNC: 34 G/DL
MCV RBC AUTO: 86 FL
MCV RBC AUTO: 86 FL
MONOCYTES # BLD AUTO: 0.3 K/UL
MONOCYTES # BLD AUTO: 0.3 K/UL
MONOCYTES NFR BLD: 12.2 %
MONOCYTES NFR BLD: 12.2 %
NEUTROPHILS # BLD AUTO: 1.5 K/UL
NEUTROPHILS # BLD AUTO: 1.5 K/UL
NEUTROPHILS NFR BLD: 55.1 %
NEUTROPHILS NFR BLD: 55.1 %
NRBC BLD-RTO: 0 /100 WBC
NRBC BLD-RTO: 0 /100 WBC
PLATELET # BLD AUTO: 44 K/UL
PLATELET # BLD AUTO: 44 K/UL
PMV BLD AUTO: 10.5 FL
PMV BLD AUTO: 10.5 FL
POTASSIUM SERPL-SCNC: 4.4 MMOL/L
PROT SERPL-MCNC: 7 G/DL
RBC # BLD AUTO: 2.5 M/UL
RBC # BLD AUTO: 2.5 M/UL
SODIUM SERPL-SCNC: 141 MMOL/L
WBC # BLD AUTO: 2.7 K/UL
WBC # BLD AUTO: 2.7 K/UL

## 2018-09-27 PROCEDURE — 80053 COMPREHEN METABOLIC PANEL: CPT | Mod: NTX

## 2018-09-27 PROCEDURE — 99215 OFFICE O/P EST HI 40 MIN: CPT | Mod: S$GLB,,, | Performed by: INTERNAL MEDICINE

## 2018-09-27 PROCEDURE — 99499 UNLISTED E&M SERVICE: CPT | Mod: S$GLB,TXP,, | Performed by: INTERNAL MEDICINE

## 2018-09-27 PROCEDURE — 85025 COMPLETE CBC W/AUTO DIFF WBC: CPT | Mod: TXP

## 2018-09-27 PROCEDURE — 36415 COLL VENOUS BLD VENIPUNCTURE: CPT | Mod: NTX

## 2018-09-27 PROCEDURE — 80195 ASSAY OF SIROLIMUS: CPT | Mod: NTX

## 2018-09-27 PROCEDURE — 82248 BILIRUBIN DIRECT: CPT | Mod: NTX

## 2018-09-27 PROCEDURE — 99999 PR PBB SHADOW E&M-EST. PATIENT-LVL III: CPT | Mod: PBBFAC,,, | Performed by: INTERNAL MEDICINE

## 2018-09-27 PROCEDURE — 99999 PR PBB SHADOW E&M-EST. PATIENT-LVL IV: CPT | Mod: PBBFAC,TXP,, | Performed by: INTERNAL MEDICINE

## 2018-09-27 PROCEDURE — 99215 OFFICE O/P EST HI 40 MIN: CPT | Mod: NTX,S$GLB,, | Performed by: INTERNAL MEDICINE

## 2018-09-27 PROCEDURE — 99999 PR PBB SHADOW E&M-EST. PATIENT-LVL III: CPT | Mod: PBBFAC,TXP,, | Performed by: INTERNAL MEDICINE

## 2018-09-27 NOTE — LETTER
September 30, 2018        Michael Peterson  [address] [address]  Phone: [phone]  Fax: [phone]             [facility] - Liver Transplant  [address]  [facility] [address]  Phone: [phone]   Patient: Jhonny Diana   MR Number: [id number]   YOB: 1990   Date of Visit: 9/27/2018       Dear Dr. Michael Peterson    Thank you for referring Jhonny Diana to me for evaluation. Attached you will find relevant portions of my assessment and plan of care.    If you have questions, please do not hesitate to call me. I look forward to following Jhonny Diana along with you.    Sincerely,    Georgina Rogers MD    Enclosure    If you would like to receive this communication electronically, please contact [email or web address] or [phone] to request [facility] Link access.    [facility] Link is a tool which provides read-only access to select patient information with whom you have a relationship. Its easy to use and provides real time access to review your patients record including encounter summaries, notes, results, and demographic information.    If you feel you have received this communication in error or would no longer like to receive these types of communications, please e-mail [email or web address]

## 2018-09-28 LAB — SIROLIMUS BLD-MCNC: 3.7 NG/ML

## 2018-09-30 PROBLEM — E44.1 MILD PROTEIN-CALORIE MALNUTRITION: Status: RESOLVED | Noted: 2018-07-23 | Resolved: 2018-09-30

## 2018-10-01 ENCOUNTER — TELEPHONE (OUTPATIENT)
Dept: TRANSPLANT | Facility: CLINIC | Age: 28
End: 2018-10-01

## 2018-10-01 NOTE — PROGRESS NOTES
Transplant Hepatology  Liver Transplant Recipient Follow-up    Transplant Date: 10/19/2017  UNOS Native Liver Dx: Acute Alcoholic Hepatitis    Mr. Diana is here for follow up of his liver transplant.  He is accompanied by his mother.      ORGAN: LIVER  Whole or Partial: whole liver  Donor Type:  - brain death  CDC High Risk: no  Donor CMV Status: Positive  Donor HCV Status: Negative  Donor HBcAb: Negative  Biliary Anastomosis: end to end  Arterial Anatomy: standard  IVC reconstruction: end to end ivc  Portal vein status: patent      Subjective:     Interval History:   Patient is doing well. He has completed ABU - the last date is 18. He has moved back to [place], [place], planning to start a job. He takes am labs at 11 am due to his schedule, but states that  He has been compliant. Had a fever, went to local ER, now resolved and feeling well.    Last HD was 18 . He is gaining weight. He feels stronger. He does not have to use a cane for support.     IS is currently sirolimus 1 mg daily.  He notes increase in urine output.       Review of Systems   Constitutional: Positive for fatigue. Negative for activity change, appetite change, chills and unexpected weight change.   HENT: Negative for hearing loss and sore throat.    Eyes: Negative for visual disturbance.   Respiratory: Negative for shortness of breath.    Cardiovascular: Negative for chest pain and leg swelling.   Gastrointestinal: Negative for abdominal distention, abdominal pain, nausea and vomiting.   Musculoskeletal: Negative for gait problem.   Skin: Negative for rash.   Neurological: Negative for weakness and headaches.   Hematological: Negative for adenopathy. Does not bruise/bleed easily.   Psychiatric/Behavioral: Negative for confusion.       Objective:     Physical Exam   Constitutional: He is oriented to person, place, and time. He appears well-developed and well-nourished. No distress.   HENT:   Head: Normocephalic and atraumatic.    Mouth/Throat: Oropharynx is clear and moist.   Eyes: Conjunctivae are normal. Pupils are equal, round, and reactive to light. No scleral icterus.   Neck: Normal range of motion. Neck supple. No thyromegaly present.   Cardiovascular: Normal rate, regular rhythm and normal heart sounds. Exam reveals no gallop and no friction rub.   No murmur heard.  Pulmonary/Chest: Effort normal and breath sounds normal. No respiratory distress. He has no wheezes. He has no rales.   Abdominal: Soft. Bowel sounds are normal. He exhibits no distension. There is no tenderness. There is no guarding.   Well healed Chevron incision   Musculoskeletal: Normal range of motion. He exhibits no edema (1+ BLE ).   Lymphadenopathy:     He has no cervical adenopathy.   Neurological: He is alert and oriented to person, place, and time. No cranial nerve deficit.   Skin: Skin is warm and dry. No erythema.   Psychiatric: He has a normal mood and affect. His behavior is normal.   Vitals reviewed.    Lab Results   Component Value Date    BILITOT 0.3 09/27/2018    AST 43 (H) 09/27/2018    ALT 36 09/27/2018    ALKPHOS 186 (H) 09/27/2018    CREATININE 1.7 (H) 09/27/2018    ALBUMIN 3.4 (L) 09/27/2018     Lab Results   Component Value Date    WBC 2.70 (L) 09/27/2018    WBC 2.70 (L) 09/27/2018    HGB 7.3 (L) 09/27/2018    HGB 7.3 (L) 09/27/2018    HCT 21.5 (L) 09/27/2018    HCT 21.5 (L) 09/27/2018    HCT 28 (L) 10/20/2017    PLT 44 (L) 09/27/2018    PLT 44 (L) 09/27/2018     Lab Results   Component Value Date    TACROLIMUS <1.5 (L) 03/04/2018    CYCLOSPORINE <10 (L) 06/02/2018    SIROLIMUSLEV 3.7 (L) 09/27/2018       Assessment/Plan:   29yo male s/p OLT with complicated course including abnormal liver tests, volume overload, renal insufficiency, biliary anastomotic strictures, thrombocytopenia and chronic abdominal pain.    Patient has been doing well. Abd pain is controlled on suboxome, managed by Addiction Psychiatry.  Patient does not have seizure  disorder, his seizure activities after liver transplant by medication-induced. We will stop his leviteracetam.    Recommendation(s):  - will check iron level  - will give Epo 20,000 unit once a month  - continue same medications  - return in 6 weeks    A total of 40 minutes were spent face-to-face with the patient during this encounter and over half of that time was spent on counseling and coordination of care.  We discussed in depth the nature of the patient's immunocompromised status, liver graft function, immunosuppression, preventative measures and the management plan in details. I also educated the patient about lifestyle modifications which may improve hepatic steatosis, overweight/obesity, insulin resistance and high blood pressure issues. I have provided the patient with an opportunity to ask questions and have all questions answered to his satisfaction.     Georgina Rogers MD         [facility] Patient Status  Functional Status: 80% - Normal activity with effort: some symptoms of disease  Physical Capacity: Limited Mobility

## 2018-10-02 PROBLEM — D72.810 LYMPHOCYTOPENIA: Status: ACTIVE | Noted: 2018-10-02

## 2018-10-03 ENCOUNTER — TELEPHONE (OUTPATIENT)
Dept: TRANSPLANT | Facility: CLINIC | Age: 28
End: 2018-10-03

## 2018-10-03 NOTE — ASSESSMENT & PLAN NOTE
Mr. Diana's thrombocytopenia is now stable. It is likely medication related and most closely related to his ongoing immunosuppressive use. There was clearly evidence of TMA while he was hospitalized, but this is not as obviously apparent now.     I do not think he needs eltrombopag, as his platelet count is sufficient at this time to prevent spontaneous bleeding. He may benefit from some form of agent if he is to need an invasive procedure. Alternatively, a platelet transfusion could be utilized.

## 2018-10-03 NOTE — TELEPHONE ENCOUNTER
Randee Diana,        Dr Rogers reviewed your labs and they are stable. No medication changes. Next labs will be due on Friday, October 12th. Let me know if you have any questions/concerns.    Thanks,  SISSY Calvo

## 2018-10-03 NOTE — TELEPHONE ENCOUNTER
----- Message from Georgina Rogers MD sent at 10/1/2018  8:15 AM CDT -----  Labs stable, no change.

## 2018-10-03 NOTE — PROGRESS NOTES
HEMATOLOGIC MALIGNANCIES PROGRESS NOTE    IDENTIFYING STATEMENT   Jhonny Monaco) is a 28 y.o. male with a  of 1990 from [place], [place] with the diagnosis of thromboctyopenia.      ONCOLOGY HISTORY:    1. 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 Anemia of chronic kidney disease    3. Alcoholic cirrhosis s/p liver transplantation on 10/19/2017  4. Substance abuse/dependence    INTERVAL HISTORY:      Mr. Diana returns to clinic for follow-up of his thrombocytopenia. He has returned home and is feeling much better overall. He has not had any bleeding episodes since his last visit. He has not had recurrence of severe thrombocytopenia, and his platelet counts have been mostly stable. He is a lot more optimistic about his future now.     Past Medical History, Past Social History and Past Family History have been reviewed and are unchanged except as noted in the interval history.    MEDICATIONS:     Current Outpatient Medications on File Prior to Visit   Medication Sig Dispense Refill    amLODIPine (NORVASC) 5 MG tablet Take 1 tablet (5 mg total) by mouth once daily. 30 tablet 11    bisacodyl (DULCOLAX) 5 mg EC tablet Take 2 tablets (10 mg total) by mouth daily as needed for Constipation. 60 tablet 5    ergocalciferol (VITAMIN D2) 50,000 unit Cap take 1 capsule by mouth every 7 days 4 capsule 2    famotidine (PEPCID) 20 MG tablet Take 1 tablet (20 mg total) by mouth every evening. 30 tablet 11    folic acid (FOLVITE) 1 MG tablet Take 1 tablet (1 mg total) by mouth once daily. 30 tablet 2    levothyroxine (SYNTHROID) 75 MCG tablet Take 1 tablet (75 mcg total) by mouth before breakfast. 30 tablet 11    ondansetron (ZOFRAN-ODT) 8 MG TbDL Take 1 tablet (8 mg total) by mouth every 8 (eight) hours as needed (nausea). 30 tablet 1    sevelamer carbonate (RENVELA) 800 mg Tab Take 2 tablets (1,600 mg total) by mouth 3 (three) times daily with meals. 180 tablet 11    sirolimus (RAPAMUNE) 1 MG Tab  "Take 2 tablets (2 mg total) by mouth once daily. 60 tablet 12    sodium bicarbonate 650 MG tablet Take 1 tablet (650 mg total) by mouth 2 (two) times daily. 60 tablet 11    ursodiol (ACTIGALL) 300 mg capsule Take 1 capsule (300 mg total) by mouth 2 (two) times daily. 60 capsule 11    torsemide (DEMADEX) 20 MG Tab Take 3 tablets (60 mg total) by mouth once daily. 90 tablet 3     No current facility-administered medications on file prior to visit.        ALLERGIES:   Review of patient's allergies indicates:   Allergen Reactions    Bactrim [sulfamethoxazole-trimethoprim] Other (See Comments)     Mookie Trell Syndrome        ROS:       Review of Systems   Constitutional: Positive for fatigue. Negative for diaphoresis, fever and unexpected weight change.   HENT:   Negative for lump/mass and sore throat.    Eyes: Negative for icterus.   Respiratory: Negative for cough and shortness of breath.    Cardiovascular: Negative for chest pain and palpitations.   Gastrointestinal: Negative for abdominal distention, constipation, diarrhea, nausea and vomiting.   Genitourinary: Negative for dysuria and frequency.    Musculoskeletal: Negative for arthralgias, gait problem and myalgias.   Skin: Negative for rash.   Neurological: Negative for dizziness, gait problem and headaches.   Hematological: Negative for adenopathy. Does not bruise/bleed easily.   Psychiatric/Behavioral: Positive for depression. The patient is nervous/anxious.        PHYSICAL EXAM:  Vitals:    09/27/18 1159   BP: (!) 119/58   Pulse: 85   Resp: 20   Temp: 98.4 °F (36.9 °C)   TempSrc: Oral   SpO2: 100%   Weight: 65.4 kg (144 lb 2.9 oz)   Height: 5' 6" (1.676 m)   PainSc: 0-No pain       KARNOFSKY PERFORMANCE STATUS 70%  ECOG 2    Physical Exam   Constitutional: He is oriented to person, place, and time. He appears well-developed and well-nourished. No distress.   HENT:   Head: Normocephalic and atraumatic.   Mouth/Throat: Mucous membranes are normal. No oral " lesions.   Eyes: Conjunctivae are normal.   Neck: No thyromegaly present.   Cardiovascular: Normal rate, regular rhythm and normal heart sounds.   No murmur heard.  Pulmonary/Chest: Breath sounds normal. He has no wheezes. He has no rales.   Catheter for dialysis is in the chest.   Abdominal: Soft. He exhibits distension. He exhibits no mass. There is no splenomegaly or hepatomegaly. There is no tenderness.   Lymphadenopathy:     He has no cervical adenopathy.        Right cervical: No deep cervical adenopathy present.       Left cervical: No deep cervical adenopathy present.     He has no axillary adenopathy.        Right: No inguinal adenopathy present.        Left: No inguinal adenopathy present.   Neurological: He is alert and oriented to person, place, and time. He has normal strength and normal reflexes. No cranial nerve deficit. Coordination normal.   Skin: No rash noted.       LAB:   Results for orders placed or performed in visit on 09/27/18   Bilirubin, direct   Result Value Ref Range    Bilirubin, Direct 0.2 0.1 - 0.3 mg/dL   Sirolimus level   Result Value Ref Range    Sirolimus Lvl 3.7 (L) 4.0 - 20.0 ng/mL   CBC auto differential   Result Value Ref Range    WBC 2.70 (L) 3.90 - 12.70 K/uL    RBC 2.50 (L) 4.60 - 6.20 M/uL    Hemoglobin 7.3 (L) 14.0 - 18.0 g/dL    Hematocrit 21.5 (L) 40.0 - 54.0 %    MCV 86 82 - 98 fL    MCH 29.2 27.0 - 31.0 pg    MCHC 34.0 32.0 - 36.0 g/dL    RDW 14.2 11.5 - 14.5 %    Platelets 44 (L) 150 - 350 K/uL    MPV 10.5 9.2 - 12.9 fL    Immature Granulocytes 0.4 0.0 - 0.5 %    Gran # (ANC) 1.5 (L) 1.8 - 7.7 K/uL    Immature Grans (Abs) 0.01 0.00 - 0.04 K/uL    Lymph # 0.7 (L) 1.0 - 4.8 K/uL    Mono # 0.3 0.3 - 1.0 K/uL    Eos # 0.1 0.0 - 0.5 K/uL    Baso # 0.01 0.00 - 0.20 K/uL    nRBC 0 0 /100 WBC    Gran% 55.1 38.0 - 73.0 %    Lymph% 26.7 18.0 - 48.0 %    Mono% 12.2 4.0 - 15.0 %    Eosinophil% 5.2 0.0 - 8.0 %    Basophil% 0.4 0.0 - 1.9 %    Differential Method Automated     Comprehensive metabolic panel   Result Value Ref Range    Sodium 141 136 - 145 mmol/L    Potassium 4.4 3.5 - 5.1 mmol/L    Chloride 112 (H) 95 - 110 mmol/L    CO2 21 (L) 23 - 29 mmol/L    Glucose 91 70 - 110 mg/dL    BUN, Bld 19 6 - 20 mg/dL    Creatinine 1.7 (H) 0.5 - 1.4 mg/dL    Calcium 9.2 8.7 - 10.5 mg/dL    Total Protein 7.0 6.0 - 8.4 g/dL    Albumin 3.4 (L) 3.5 - 5.2 g/dL    Total Bilirubin 0.3 0.1 - 1.0 mg/dL    Alkaline Phosphatase 186 (H) 55 - 135 U/L    AST 43 (H) 10 - 40 U/L    ALT 36 10 - 44 U/L    Anion Gap 8 8 - 16 mmol/L    eGFR if African American >60.0 >60 mL/min/1.73 m^2    eGFR if non  53.3 (A) >60 mL/min/1.73 m^2   CBC auto differential   Result Value Ref Range    WBC 2.70 (L) 3.90 - 12.70 K/uL    RBC 2.50 (L) 4.60 - 6.20 M/uL    Hemoglobin 7.3 (L) 14.0 - 18.0 g/dL    Hematocrit 21.5 (L) 40.0 - 54.0 %    MCV 86 82 - 98 fL    MCH 29.2 27.0 - 31.0 pg    MCHC 34.0 32.0 - 36.0 g/dL    RDW 14.2 11.5 - 14.5 %    Platelets 44 (L) 150 - 350 K/uL    MPV 10.5 9.2 - 12.9 fL    Immature Granulocytes 0.4 0.0 - 0.5 %    Gran # (ANC) 1.5 (L) 1.8 - 7.7 K/uL    Immature Grans (Abs) 0.01 0.00 - 0.04 K/uL    Lymph # 0.7 (L) 1.0 - 4.8 K/uL    Mono # 0.3 0.3 - 1.0 K/uL    Eos # 0.1 0.0 - 0.5 K/uL    Baso # 0.01 0.00 - 0.20 K/uL    nRBC 0 0 /100 WBC    Gran% 55.1 38.0 - 73.0 %    Lymph% 26.7 18.0 - 48.0 %    Mono% 12.2 4.0 - 15.0 %    Eosinophil% 5.2 0.0 - 8.0 %    Basophil% 0.4 0.0 - 1.9 %    Differential Method Automated        PROBLEMS ASSESSED THIS VISIT:    1. Thrombocytopenia    2. Anemia of chronic renal failure, stage 3 (moderate)    3. S/P liver transplant    4. Lymphocytopenia    5. Prophylactic immunotherapy        PLAN:       Thrombocytopenia  Mr. Diana's thrombocytopenia is now stable. It is likely medication related and most closely related to his ongoing immunosuppressive use. There was clearly evidence of TMA while he was hospitalized, but this is not as obviously apparent now.     I  do not think he needs eltrombopag, as his platelet count is sufficient at this time to prevent spontaneous bleeding. He may benefit from some form of agent if he is to need an invasive procedure. Alternatively, a platelet transfusion could be utilized.     Anemia of chronic renal failure  Hemoglobin is a bit worse today. This may be medication related as well and can be monitored.     Lymphocytopenia  Secondary to his immunosuppressed status as a liver transplant recipient.     Follow-up as needed or requested by transplant hepatology.     Syed Maurice MD  Hematology and Stem Cell Transplant

## 2018-10-05 ENCOUNTER — TELEPHONE (OUTPATIENT)
Dept: TRANSPLANT | Facility: CLINIC | Age: 28
End: 2018-10-05

## 2018-10-05 ENCOUNTER — COMMITTEE REVIEW (OUTPATIENT)
Dept: TRANSPLANT | Facility: CLINIC | Age: 28
End: 2018-10-05

## 2018-10-05 LAB
ACID FAST MOD KINY STN SPEC: NORMAL
MYCOBACTERIUM SPEC QL CULT: NORMAL

## 2018-10-05 NOTE — COMMITTEE REVIEW
Native Organ Dx:       Follow up with patient to determine why he cancelled his appointments on 9/27/2018. He will need a 24 hour urine PCR.    Nurse spoke with the patient's mom regarding cancelled nuclear testing GFR by [facility] and she states that they were pressed for time and they were not able to make that appointment although they were here at main campus. She was informed of the importance of the test and that if he may need a kidney we have a certain time window (1 year) post liver tx for him to get extra points with listing. She was informed that we will need a 24 hour urine and that we will mail out the containers and they will have to bring them back to the lab. She was informed to call once he receives the containers so we can schedule the appointment. Patient's mom verbalized understanding of the above information. All questions answered.      Note written by: Carroll Roca RN     ===============================================    I was present at the meeting and attest to the decision of the committee

## 2018-10-12 ENCOUNTER — TELEPHONE (OUTPATIENT)
Dept: TRANSPLANT | Facility: CLINIC | Age: 28
End: 2018-10-12

## 2018-10-12 ENCOUNTER — DOCUMENTATION ONLY (OUTPATIENT)
Dept: TRANSPLANT | Facility: CLINIC | Age: 28
End: 2018-10-12

## 2018-10-12 ENCOUNTER — PATIENT MESSAGE (OUTPATIENT)
Dept: TRANSPLANT | Facility: CLINIC | Age: 28
End: 2018-10-12

## 2018-10-12 NOTE — TELEPHONE ENCOUNTER
returned call to pt . He stated someone in kidney txp will be sending a 24 hr urine container to him. He wants it done in [place] because he cannot drive it to [place]. Asked him to call the nurse back that he talked to.

## 2018-10-12 NOTE — NURSING
Nurse spoke with the patient's mom and she states she has not received the 24 hour containers. She was informed to call once the containers are received. Patient's mom verbalized understanding of the above information.

## 2018-10-16 ENCOUNTER — PATIENT MESSAGE (OUTPATIENT)
Dept: TRANSPLANT | Facility: CLINIC | Age: 28
End: 2018-10-16

## 2018-10-16 ENCOUNTER — DOCUMENTATION ONLY (OUTPATIENT)
Dept: TRANSPLANT | Facility: CLINIC | Age: 28
End: 2018-10-16

## 2018-10-16 ENCOUNTER — TELEPHONE (OUTPATIENT)
Dept: TRANSPLANT | Facility: CLINIC | Age: 28
End: 2018-10-16

## 2018-10-16 NOTE — NURSING
Nurse spoke with the patient and he states he never received the 24 hour urine container. He was very adamant about not wanting to do the test since he was not on dialysis and having to drive to [place] to get the testing done. Nurse informed the patient of the importance of the test and he states if he dives to [place] he would like to have his lab work for liver done at the same time because he does not like to get stuck. Patient was informed that we could arrange to have his labwork done at the same time  Patient verbalized understanding of the above information.

## 2018-10-16 NOTE — TELEPHONE ENCOUNTER
----- Message from Jolly Rosario sent at 10/16/2018  3:27 PM CDT -----  Contact: Patient Mom  Needs Advice    Reason for call: called to f/u with [place] about 24-hour urine collect.         Communication Preference: [phone] or after 5pm [phone]    Additional Information: n/a

## 2018-10-16 NOTE — TELEPHONE ENCOUNTER
Returned call to pt's mother. She was specific questions about tests ordered by transplant Nephrology. Forwarded msg to SISSY Hodges.

## 2018-10-17 ENCOUNTER — TELEPHONE (OUTPATIENT)
Dept: TRANSPLANT | Facility: CLINIC | Age: 28
End: 2018-10-17

## 2018-10-17 ENCOUNTER — DOCUMENTATION ONLY (OUTPATIENT)
Dept: TRANSPLANT | Facility: CLINIC | Age: 28
End: 2018-10-17

## 2018-10-17 NOTE — TELEPHONE ENCOUNTER
Nurse spoke with the patient's mom and explained the reason for the testing. She states the she received the containers on yesterday afternoon and the patient is working and unable to take off and to do the testing. She was informed that we will discuss his case in selection committee this Friday. She was informed that someone will give her a call. The selection committee will decide at that point how to precede. She is requesting a call back on Friday with an update. Patient's mom verbalized understanding of the above information all questions answered.

## 2018-10-17 NOTE — TELEPHONE ENCOUNTER
----- Message from Enriqueta Herron sent at 10/17/2018  9:37 AM CDT -----  Contact: mother      ----- Message -----  From: Jenniffer Rollins  Sent: 10/17/2018   8:06 AM  To: [facility] Pre-Kidney Transplant Non-Clinical    Calling to speak w/Carroll Roca regarding test pt is to have completed and bring to [place]    pls contact mother/she states she was to get a call back yesterday and she didn't     Elvira [phone]

## 2018-10-19 ENCOUNTER — COMMITTEE REVIEW (OUTPATIENT)
Dept: TRANSPLANT | Facility: CLINIC | Age: 28
End: 2018-10-19

## 2018-10-19 NOTE — LETTER
October 22, 2018  Jhonny Diana  [address] [place], [place] [id number]      Dear Mr. Diana:  MRN: [id number]    It is the duty of the [facility] Selection Committee to determine which patients are candidates for a transplant. For this reason, our committee has the difficult task of evaluating patients to determine which ones have the greatest chance of having a successful transplant. We are aware of the magnitude of this responsibility, and we approach it with reverence and humility.    It is with regret I inform you that you are not approved as a transplant candidate due to kidney function (GFR) is above 20% and you no longer meet criteria for a kidney transplant. Your kidney function is greater than 60%.   Based on this review, we have determined that at this time, you are not a candidate for a transplant at [facility].      The selection committee carefully considers each patient's transplant candidacy and determines whether it is safe to proceed with transplantation on a case-by-case basis using established selection criteria.  At present, the risk of proceeding with an elective transplant surgery has become too high.                                                                               Although the selection committee believes you are not a suitable transplant candidate, you have the option to be evaluated at other transplant centers who may have different selection criteria.  You may request your [facility] records be sent to any center of your choice by contacting our Medical Records Department at [phone].                                                                               Attached is a letter from the United Network for Organ Sharing (UNOS).  It describes the services and information offered to patients by UNOS and the Organ Procurement and Transplant Network.    The [facility] Selection Committee sincerely wishes you the best and remains available to answer any questions.   Please do not hesitate to contact our pre-transplant office if we can assist you in any other way.                                                                               Sincerely,    Savanna Gray MD  Medical Director, Kidney & Kidney/Pancreas Transplantation      Encl: UNOS Letter            OPTN/UNOS: Your Resource for Organ Transplant Information        If you have a question regarding your own medical care, you always should call your transplant center first. However, for general organ transplant-related information, you can call the United Network for Organ Sharing (UNOS) toll-free patient services line at 1-654.935.2655.    Anyone, including potential transplant candidates, recipients, family members/friends, living donors, and/or donor family members can call this number to:    · talk about organ donation, living donation, how transplant and donation work, the donation process, transplant policies, and transplant/donor information;  · get a free patient information kit with helpful booklets, waiting list and transplant information, and a list of all transplant centers;  · ask questions about the Organ Procurement and Transplantation Network (OPTN) web site (www.optn.transplant.hrsa.gov); the UNOS Web site (www.unos.org); or the UNOS web site for living donors and transplant recipients (www.transplantliving.org);  · learn how UNOS and the OPTN can help you;  · talk about any concerns that you may have with a transplant center and how they perform    UNOS is a not-for-profit organization that provides all of the administrative services for the national OPTN under federal contract to the Health Resources and Services Administration (HRSA), an agency under the U.S. Department of Health and Human Services (HHS).     UNOS and OPTN responsibilities include:    · writing educational material for patients, the public and professionals;  · helping to make people aware of the need for donated organs  and tissue;  · writing organ transplant policy with help from doctors, nurses, transplant patients/candidates, donor families, living donors, and the public;  · coordinating the organ matching and placement process;  · collecting information about every organ transplant and donation that occurs in the [place].    Remember, you should contact your transplant center directly if you have questions or concerns about your own medical care including medical records, work-up progress and test reports. [facility] is not your transplant center, and staff at [facility] will not be able to transfer you to your transplant center, so keep your transplant centers phone number handy. But, while you research your transplant needs and learn as much as you can about transplantation and donation, we welcome your call to our toll-free patient services line at [phone].

## 2018-10-19 NOTE — COMMITTEE REVIEW
Native Organ Dx:       Not approved for LRD/CAD transplant due to GFR >20, non compliance with liver labs      Note written by     ===============================================    I was present at the meeting and attest to the decision of the committee  I was present at the meeting and attest to the decision of the committee

## 2018-10-22 ENCOUNTER — PATIENT MESSAGE (OUTPATIENT)
Dept: TRANSPLANT | Facility: CLINIC | Age: 28
End: 2018-10-22

## 2018-10-26 ENCOUNTER — DOCUMENTATION ONLY (OUTPATIENT)
Dept: PSYCHIATRY | Facility: CLINIC | Age: 28
End: 2018-10-26

## 2018-10-26 NOTE — PROGRESS NOTES
Patient no showed to appointment with this provider on 10/24 and 9/27. Also late cancelled appointment with LCSW Nino Villarreal.

## 2018-10-29 ENCOUNTER — PATIENT MESSAGE (OUTPATIENT)
Dept: TRANSPLANT | Facility: CLINIC | Age: 28
End: 2018-10-29

## 2018-11-02 LAB
ALBUMIN SERPL-MCNC: 4.4 G/DL (ref 3.5–5.5)
ALBUMIN/GLOB SERPL: 1.3 {RATIO} (ref 1.2–2.2)
ALP SERPL-CCNC: 206 IU/L (ref 39–117)
ALT SERPL-CCNC: 72 IU/L (ref 0–44)
AST SERPL-CCNC: 69 IU/L (ref 0–40)
BASOPHILS # BLD AUTO: 0 X10E3/UL (ref 0–0.2)
BASOPHILS NFR BLD AUTO: 1 %
BILIRUB SERPL-MCNC: 0.3 MG/DL (ref 0–1.2)
BUN SERPL-MCNC: 18 MG/DL (ref 6–20)
BUN/CREAT SERPL: 11 (ref 9–20)
CALCIUM SERPL-MCNC: 9.1 MG/DL (ref 8.7–10.2)
CHLORIDE SERPL-SCNC: 109 MMOL/L (ref 96–106)
CO2 SERPL-SCNC: 17 MMOL/L (ref 20–29)
CREAT SERPL-MCNC: 1.6 MG/DL (ref 0.76–1.27)
EGFR IF AFRICAN AMERICAN: 66 ML/MIN/1.73
EOSINOPHIL # BLD AUTO: 0.2 X10E3/UL (ref 0–0.4)
EOSINOPHIL NFR BLD AUTO: 5 %
ERYTHROCYTE [DISTWIDTH] IN BLOOD BY AUTOMATED COUNT: 15.4 % (ref 12.3–15.4)
EST. GFR  (NON AFRICAN AMERICAN): 57 ML/MIN/1.73
EXT ALBUMIN: 4.4
EXT ALKALINE PHOSPHATASE: 206
EXT ALT: 72
EXT AST: 69
EXT BASOPHIL%: 1
EXT BILIRUBIN TOTAL: 0.3
EXT BUN: 18
EXT CALCIUM: 9.1
EXT CHLORIDE: 109
EXT CO2: 17
EXT CREATININE: 1.6 MG/DL
EXT EOSINOPHIL%: 5
EXT GLUCOSE: 88
EXT HEMATOCRIT: 27.5
EXT HEMOGLOBIN: 9.4
EXT LYMPH%: 31
EXT MONOCYTES%: 5
EXT PLATELETS: 49
EXT POTASSIUM: 4.2
EXT PROTEIN TOTAL: 7.7
EXT SEGS%: 58
EXT SIROLIMUS LVL: 3.8
EXT SODIUM: 142 MMOL/L
EXT WBC: 3.5
GLOBULIN SER CALC-MCNC: 3.3 G/DL (ref 1.5–4.5)
GLUCOSE SERPL-MCNC: 88 MG/DL (ref 65–99)
HCT VFR BLD AUTO: 27.5 % (ref 37.5–51)
HGB BLD-MCNC: 9.4 G/DL (ref 13–17.7)
IMM GRANULOCYTES # BLD: 0 X10E3/UL (ref 0–0.1)
IMM GRANULOCYTES NFR BLD: 0 %
LYMPHOCYTES # BLD AUTO: 1.1 X10E3/UL (ref 0.7–3.1)
LYMPHOCYTES NFR BLD AUTO: 31 %
MCH RBC QN AUTO: 29.5 PG (ref 26.6–33)
MCHC RBC AUTO-ENTMCNC: 34.2 G/DL (ref 31.5–35.7)
MCV RBC AUTO: 86 FL (ref 79–97)
MONOCYTES # BLD AUTO: 0.2 X10E3/UL (ref 0.1–0.9)
MONOCYTES NFR BLD AUTO: 5 %
MORPHOLOGY BLD-IMP: ABNORMAL
NEUTROPHILS # BLD AUTO: 2 X10E3/UL (ref 1.4–7)
NEUTROPHILS NFR BLD AUTO: 58 %
PLATELET # BLD AUTO: 49 X10E3/UL (ref 150–379)
POTASSIUM SERPL-SCNC: 4.2 MMOL/L (ref 3.5–5.2)
PROT SERPL-MCNC: 7.7 G/DL (ref 6–8.5)
RBC # BLD AUTO: 3.19 X10E6/UL (ref 4.14–5.8)
SIROLIMUS BLD-MCNC: 3.8 NG/ML (ref 3–20)
SODIUM SERPL-SCNC: 142 MMOL/L (ref 134–144)
WBC # BLD AUTO: 3.5 X10E3/UL (ref 3.4–10.8)

## 2018-11-05 ENCOUNTER — TELEPHONE (OUTPATIENT)
Dept: TRANSPLANT | Facility: CLINIC | Age: 28
End: 2018-11-05

## 2018-11-05 NOTE — TELEPHONE ENCOUNTER
----- Message from Blank Umana MD sent at 10/26/2018  8:23 AM CDT -----  Patient no showed to last 2 appointments scheduled with this writer, also late cancelled appointment with .

## 2018-11-07 DIAGNOSIS — Z94.4 LIVER TRANSPLANTED: ICD-10-CM

## 2018-11-07 RX ORDER — SIROLIMUS 1 MG/1
3 TABLET, FILM COATED ORAL DAILY
Qty: 90 TABLET | Refills: 12 | Status: SHIPPED | OUTPATIENT
Start: 2018-11-07 | End: 2018-11-08 | Stop reason: DRUGHIGH

## 2018-11-07 NOTE — TELEPHONE ENCOUNTER
Called pt to review labs. Rapa level elevated. Pt to increase dose to 3mg daily. Repeat labs on 11/12. Lab order faxed to [facility] in [place].

## 2018-11-07 NOTE — TELEPHONE ENCOUNTER
----- Message from Georgina Rogers MD sent at 11/4/2018 11:25 PM CST -----  Rapa: 3.8, liver tests slightly elevated, increase sirolimus to 3 mg daily, labs in 1 week

## 2018-11-08 DIAGNOSIS — Z94.4 LIVER TRANSPLANTED: ICD-10-CM

## 2018-11-08 RX ORDER — SIROLIMUS 1 MG/1
4 TABLET, FILM COATED ORAL DAILY
Qty: 120 TABLET | Refills: 12 | Status: SHIPPED | OUTPATIENT
Start: 2018-11-08 | End: 2018-11-09 | Stop reason: SDUPTHER

## 2018-11-08 NOTE — TELEPHONE ENCOUNTER
Randee Diana,    Dr Morgan would like you to increase your Sirolimus dose to 4mg daily starting tomorrow and labs on Monday.    Thanks,  SISSY Calvo

## 2018-11-08 NOTE — TELEPHONE ENCOUNTER
----- Message from Georgina Rogers MD sent at 11/8/2018  3:39 PM CST -----  Liver tests elevated, please advise to increase sirolimus to  4 mg daily and repeat labs next week. Please also check CMV PCR and PETH test.

## 2018-11-09 ENCOUNTER — TELEPHONE (OUTPATIENT)
Dept: TRANSPLANT | Facility: CLINIC | Age: 28
End: 2018-11-09

## 2018-11-09 RX ORDER — SIROLIMUS 1 MG/1
4 TABLET, FILM COATED ORAL DAILY
Qty: 120 TABLET | Refills: 12 | Status: SHIPPED | OUTPATIENT
Start: 2018-11-09 | End: 2019-03-27 | Stop reason: DRUGHIGH

## 2018-11-09 NOTE — TELEPHONE ENCOUNTER
Returned call to pt. He wanted verification on dose of Rapa he should be taking. Per Dr Rogers 4mg. Will also fax updated lab orders to [facility].

## 2018-11-09 NOTE — TELEPHONE ENCOUNTER
----- Message from Cara Jhaveri PharmD sent at 11/8/2018  4:35 PM CST -----  Regarding: RX TO PHARMACY IN [facility] [place], we received the dose increase on pt's rapamune, however it should be sent to pt's pharmacy in [place].    (I can only ship to [place] & [place])    Thanks!  Cara Jhaveri Pharm.D., B.C.P.S.  Clinical Pharmacist for Transplant Services  [facility].  Phone [phone] (or ext [phone])  Fax [phone]

## 2018-11-09 NOTE — TELEPHONE ENCOUNTER
----- Message from Fco Santos sent at 11/9/2018  1:30 PM CST -----  Contact: patient   Patient needs clarification on  sirolimus (RAPAMUNE) 1 MG Tab medication before he takes it.           Please call [phone]      Thanks!

## 2018-11-15 LAB
EXT ALBUMIN: 4.5
EXT ALKALINE PHOSPHATASE: 168
EXT ALT: 47
EXT AST: 47
EXT BASOPHIL%: 1
EXT BILIRUBIN TOTAL: 0.3
EXT BUN: 21
EXT CALCIUM: 9.2
EXT CHLORIDE: 110
EXT CO2: 20
EXT CREATININE: 1.64 MG/DL
EXT EOSINOPHIL%: 7
EXT GLUCOSE: 82
EXT HEMATOCRIT: 30
EXT HEMOGLOBIN: 9.9
EXT LYMPH%: 29
EXT MONOCYTES%: 8
EXT PLATELETS: 65
EXT POTASSIUM: 4.4
EXT PROTEIN TOTAL: 7.5
EXT SEGS%: 55
EXT SIROLIMUS LVL: NORMAL
EXT SODIUM: 144 MMOL/L
EXT WBC: 3.7

## 2018-11-16 LAB
ALBUMIN SERPL-MCNC: 4.5 G/DL (ref 3.5–5.5)
ALBUMIN/GLOB SERPL: 1.5 {RATIO} (ref 1.2–2.2)
ALP SERPL-CCNC: 168 IU/L (ref 39–117)
ALT SERPL-CCNC: 47 IU/L (ref 0–44)
AST SERPL-CCNC: 47 IU/L (ref 0–40)
BASOPHILS # BLD AUTO: 0 X10E3/UL (ref 0–0.2)
BASOPHILS NFR BLD AUTO: 1 %
BILIRUB SERPL-MCNC: 0.3 MG/DL (ref 0–1.2)
BUN SERPL-MCNC: 21 MG/DL (ref 6–20)
BUN/CREAT SERPL: 13 (ref 9–20)
CALCIUM SERPL-MCNC: 9.2 MG/DL (ref 8.7–10.2)
CHLORIDE SERPL-SCNC: 110 MMOL/L (ref 96–106)
CO2 SERPL-SCNC: 20 MMOL/L (ref 20–29)
CREAT SERPL-MCNC: 1.64 MG/DL (ref 0.76–1.27)
EGFR IF AFRICAN AMERICAN: 64 ML/MIN/1.73
EOSINOPHIL # BLD AUTO: 0.3 X10E3/UL (ref 0–0.4)
EOSINOPHIL NFR BLD AUTO: 7 %
ERYTHROCYTE [DISTWIDTH] IN BLOOD BY AUTOMATED COUNT: 14.9 % (ref 12.3–15.4)
EST. GFR  (NON AFRICAN AMERICAN): 56 ML/MIN/1.73
GLOBULIN SER CALC-MCNC: 3 G/DL (ref 1.5–4.5)
GLUCOSE SERPL-MCNC: 82 MG/DL (ref 65–99)
HCT VFR BLD AUTO: 30 % (ref 37.5–51)
HGB BLD-MCNC: 9.9 G/DL (ref 13–17.7)
IMM GRANULOCYTES # BLD: 0 X10E3/UL (ref 0–0.1)
IMM GRANULOCYTES NFR BLD: 0 %
LYMPHOCYTES # BLD AUTO: 1.1 X10E3/UL (ref 0.7–3.1)
LYMPHOCYTES NFR BLD AUTO: 29 %
MCH RBC QN AUTO: 29 PG (ref 26.6–33)
MCHC RBC AUTO-ENTMCNC: 33 G/DL (ref 31.5–35.7)
MCV RBC AUTO: 88 FL (ref 79–97)
MONOCYTES # BLD AUTO: 0.3 X10E3/UL (ref 0.1–0.9)
MONOCYTES NFR BLD AUTO: 8 %
MORPHOLOGY BLD-IMP: ABNORMAL
NEUTROPHILS # BLD AUTO: 2.1 X10E3/UL (ref 1.4–7)
NEUTROPHILS NFR BLD AUTO: 55 %
PLATELET # BLD AUTO: 65 X10E3/UL (ref 150–379)
POTASSIUM SERPL-SCNC: 4.4 MMOL/L (ref 3.5–5.2)
PROT SERPL-MCNC: 7.5 G/DL (ref 6–8.5)
RBC # BLD AUTO: 3.41 X10E6/UL (ref 4.14–5.8)
SIROLIMUS BLD-MCNC: 5.6 NG/ML (ref 3–20)
SODIUM SERPL-SCNC: 144 MMOL/L (ref 134–144)
WBC # BLD AUTO: 3.7 X10E3/UL (ref 3.4–10.8)

## 2018-11-20 LAB
EXT ALBUMIN: 4.5
EXT ALKALINE PHOSPHATASE: 168
EXT ALT: 47
EXT AST: 47
EXT BASOPHIL%: 1
EXT BILIRUBIN TOTAL: 0.3
EXT BUN: 21
EXT CALCIUM: 9.2
EXT CHLORIDE: 110
EXT CO2: 20
EXT CREATININE: 1.64 MG/DL
EXT EOSINOPHIL%: 7
EXT GLUCOSE: 82
EXT HEMATOCRIT: 30
EXT HEMOGLOBIN: 9.9
EXT LYMPH%: 29
EXT MONOCYTES%: 8
EXT PLATELETS: 65
EXT POTASSIUM: 4.4
EXT PROTEIN TOTAL: 7.5
EXT SEGS%: 55
EXT SIROLIMUS LVL: 5.6
EXT SODIUM: 144 MMOL/L
EXT WBC: 3.7

## 2018-11-30 ENCOUNTER — TELEPHONE (OUTPATIENT)
Dept: TRANSPLANT | Facility: CLINIC | Age: 28
End: 2018-11-30

## 2018-11-30 NOTE — TELEPHONE ENCOUNTER
----- Message from Georgina Rogers MD sent at 11/20/2018 12:59 PM CST -----  Sirolimus 5.6, labs stable, no change.

## 2018-11-30 NOTE — TELEPHONE ENCOUNTER
----- Message from Georgina Rogers MD sent at 11/19/2018  4:21 PM CST -----  Labs stable, no change, sirolimus 4 mg daily.

## 2018-11-30 NOTE — TELEPHONE ENCOUNTER
----- Message from Georgina Rogers MD sent at 11/16/2018  1:45 PM CST -----  Liver enzymes improving. Otherwise, stable labs. Sirolimus level is pending. Please let me know when sirolimus level is back. No med change for now.

## 2018-11-30 NOTE — TELEPHONE ENCOUNTER
Randee Ramos,    Dr Rogers reviewed your labs and they are stable. No medication adjustments. Your next labs will be due on 12/31/18. After reviewing your chart it appears that you are overdue for an appointment with Dr Rogers and also Dr Umana. Please call [phone] and talk with Anne our  to get those appointments scheduled. Remember, these follow up appointments are very important in managing your post transplant recovery.    Thanks,  SISSY Calvo

## 2019-01-03 ENCOUNTER — TELEPHONE (OUTPATIENT)
Dept: TRANSPLANT | Facility: CLINIC | Age: 29
End: 2019-01-03

## 2019-01-03 NOTE — TELEPHONE ENCOUNTER
----- Message from Blank Umana MD sent at 1/3/2019 10:19 AM CST -----  Regarding: RE: Pt appt  Unfortunately I am overbooked on that day and can not see him. Let me know if there's any other appointments that are scheduled in the near future and I'll check then. Unfortunately, I don't think hes motivated to come in to see me as he no showed or late cancelled with me and with Nino Villarreal multiple times. I think he and his mother strongly believed that his addiction was over and the ordeal of the liver transplant was sufficient treatment for the rest of his life and there's limited insight into the chronic nature of addiction.      ----- Message -----  From: [place] Agustin RN  Sent: 1/3/2019   9:29 AM  To: Blank Umana MD  Subject: Pt appt                                          Randee Umana,    I have finally got Mr Diana to schedule a f/u appt with Dr Rogers. He will be here on Monday, 1/7/19. Do you have any openings to see him? I know he is way overdue for an appt with you also.    Thanks,  SISSY Calvo  Post Liver Coordinator

## 2019-01-03 NOTE — TELEPHONE ENCOUNTER
----- Message from Fco Santos sent at 1/3/2019  9:14 AM CST -----  Contact: patient mother Deborah  Patient mother Deborah stated she called yesterday to schedule several appointment for the patient but didn't get a return call.         Please call [phone]      Thanks!

## 2019-01-03 NOTE — TELEPHONE ENCOUNTER
Returned call to pt's mother Sanjuanita. PT scheduled for Dr Rogers on Monday at 2pm. He will do labs tomorrow. Will fax updated standing lab orders.

## 2019-01-07 ENCOUNTER — OFFICE VISIT (OUTPATIENT)
Dept: TRANSPLANT | Facility: CLINIC | Age: 29
End: 2019-01-07
Payer: COMMERCIAL

## 2019-01-07 VITALS
HEART RATE: 82 BPM | WEIGHT: 141.75 LBS | SYSTOLIC BLOOD PRESSURE: 152 MMHG | DIASTOLIC BLOOD PRESSURE: 95 MMHG | BODY MASS INDEX: 22.78 KG/M2 | HEIGHT: 66 IN | TEMPERATURE: 99 F | RESPIRATION RATE: 18 BRPM | OXYGEN SATURATION: 100 %

## 2019-01-07 DIAGNOSIS — Z48.23 ENCOUNTER FOR AFTERCARE FOLLOWING LIVER TRANSPLANT: Primary | ICD-10-CM

## 2019-01-07 DIAGNOSIS — E55.9 VITAMIN D DEFICIENCY: ICD-10-CM

## 2019-01-07 DIAGNOSIS — Z94.4 S/P LIVER TRANSPLANT: Chronic | ICD-10-CM

## 2019-01-07 DIAGNOSIS — K83.1 BILIARY STRICTURE OF TRANSPLANTED LIVER: ICD-10-CM

## 2019-01-07 DIAGNOSIS — Z29.89 PROPHYLACTIC IMMUNOTHERAPY: Chronic | ICD-10-CM

## 2019-01-07 DIAGNOSIS — T86.49 BILIARY STRICTURE OF TRANSPLANTED LIVER: ICD-10-CM

## 2019-01-07 DIAGNOSIS — Z79.60 LONG-TERM USE OF IMMUNOSUPPRESSANT MEDICATION: Chronic | ICD-10-CM

## 2019-01-07 LAB
ALBUMIN SERPL-MCNC: 4.4 G/DL (ref 3.5–5.5)
ALBUMIN/GLOB SERPL: 1.3 {RATIO} (ref 1.2–2.2)
ALP SERPL-CCNC: 138 IU/L (ref 39–117)
ALT SERPL-CCNC: 34 IU/L (ref 0–44)
AST SERPL-CCNC: 46 IU/L (ref 0–40)
BASOPHILS # BLD AUTO: 0 X10E3/UL (ref 0–0.2)
BASOPHILS NFR BLD AUTO: 1 %
BILIRUB SERPL-MCNC: 0.6 MG/DL (ref 0–1.2)
BUN SERPL-MCNC: 27 MG/DL (ref 6–20)
BUN/CREAT SERPL: 17 (ref 9–20)
CALCIUM SERPL-MCNC: 9.2 MG/DL (ref 8.7–10.2)
CHLORIDE SERPL-SCNC: 111 MMOL/L (ref 96–106)
CO2 SERPL-SCNC: 19 MMOL/L (ref 20–29)
CREAT SERPL-MCNC: 1.6 MG/DL (ref 0.76–1.27)
EOSINOPHIL # BLD AUTO: 0.1 X10E3/UL (ref 0–0.4)
EOSINOPHIL NFR BLD AUTO: 4 %
ERYTHROCYTE [DISTWIDTH] IN BLOOD BY AUTOMATED COUNT: 16.4 % (ref 12.3–15.4)
GLOBULIN SER CALC-MCNC: 3.3 G/DL (ref 1.5–4.5)
GLUCOSE SERPL-MCNC: 83 MG/DL (ref 65–99)
HCT VFR BLD AUTO: 37.8 % (ref 37.5–51)
HGB BLD-MCNC: 12.8 G/DL (ref 13–17.7)
IMM GRANULOCYTES # BLD AUTO: 0 X10E3/UL (ref 0–0.1)
IMM GRANULOCYTES NFR BLD AUTO: 0 %
LYMPHOCYTES # BLD AUTO: 0.9 X10E3/UL (ref 0.7–3.1)
LYMPHOCYTES NFR BLD AUTO: 28 %
MCH RBC QN AUTO: 31.4 PG (ref 26.6–33)
MCHC RBC AUTO-ENTMCNC: 33.9 G/DL (ref 31.5–35.7)
MCV RBC AUTO: 93 FL (ref 79–97)
MONOCYTES # BLD AUTO: 0.4 X10E3/UL (ref 0.1–0.9)
MONOCYTES NFR BLD AUTO: 12 %
MORPHOLOGY BLD-IMP: ABNORMAL
NEUTROPHILS # BLD AUTO: 1.8 X10E3/UL (ref 1.4–7)
NEUTROPHILS NFR BLD AUTO: 55 %
PLATELET # BLD AUTO: 42 X10E3/UL (ref 150–379)
POTASSIUM SERPL-SCNC: 4.5 MMOL/L (ref 3.5–5.2)
PROT SERPL-MCNC: 7.7 G/DL (ref 6–8.5)
RBC # BLD AUTO: 4.07 X10E6/UL (ref 4.14–5.8)
SIROLIMUS BLD-MCNC: 11.7 NG/ML (ref 3–20)
SODIUM SERPL-SCNC: 146 MMOL/L (ref 134–144)
WBC # BLD AUTO: 3.3 X10E3/UL (ref 3.4–10.8)

## 2019-01-07 PROCEDURE — 99999 PR PBB SHADOW E&M-EST. PATIENT-LVL IV: CPT | Mod: PBBFAC,,, | Performed by: INTERNAL MEDICINE

## 2019-01-07 PROCEDURE — 99215 OFFICE O/P EST HI 40 MIN: CPT | Mod: S$GLB,,, | Performed by: INTERNAL MEDICINE

## 2019-01-07 PROCEDURE — 99999 PR PBB SHADOW E&M-EST. PATIENT-LVL IV: ICD-10-PCS | Mod: PBBFAC,,, | Performed by: INTERNAL MEDICINE

## 2019-01-07 PROCEDURE — 99215 PR OFFICE/OUTPT VISIT, EST, LEVL V, 40-54 MIN: ICD-10-PCS | Mod: S$GLB,,, | Performed by: INTERNAL MEDICINE

## 2019-01-07 NOTE — PROGRESS NOTES
Transplant Hepatology  Liver Transplant Recipient Follow-up    Transplant Date: 10/19/2017  UNOS Native Liver Dx: Acute Alcoholic Hepatitis    Mr. Diana is here for follow up of his liver transplant.  He is accompanied by his mother.      ORGAN: LIVER  Whole or Partial: whole liver  Donor Type:  - brain death  CDC High Risk: no  Donor CMV Status: Positive  Donor HCV Status: Negative  Donor HBcAb: Negative  Biliary Anastomosis: end to end  Arterial Anatomy: standard  IVC reconstruction: end to end ivc  Portal vein status: patent      Subjective:     Interval History:   Patient is doing well.   Last HD was 18 . He is gaining weight. He feels stronger. He does not use any support for ambulation. 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    IS is currently sirolimus 1 mg daily.      Review of Systems   Constitutional: Negative for activity change, appetite change, chills, fatigue and unexpected weight change.   HENT: Negative for hearing loss and sore throat.    Eyes: Negative for visual disturbance.   Respiratory: Negative for shortness of breath.    Cardiovascular: Negative for chest pain and leg swelling.   Gastrointestinal: Negative for abdominal distention, abdominal pain, nausea and vomiting.        Abd bulging   Musculoskeletal: Negative for gait problem.   Skin: Negative for rash.   Neurological: Negative for weakness and headaches.   Hematological: Negative for adenopathy. Does not bruise/bleed easily.   Psychiatric/Behavioral: Negative for confusion.       Objective:     Physical Exam   Constitutional: He is oriented to person, place, and time. He appears well-developed and well-nourished. No distress.   HENT:   Head: Normocephalic and atraumatic.   Mouth/Throat: Oropharynx is clear and moist.   Eyes: Conjunctivae are normal. Pupils are equal, round, and reactive to light. No scleral icterus.   Neck: Normal range of motion. Neck supple. No thyromegaly present.   Cardiovascular: Normal rate,  regular rhythm and normal heart sounds. Exam reveals no gallop and no friction rub.   No murmur heard.  Pulmonary/Chest: Effort normal and breath sounds normal. No respiratory distress. He has no wheezes. He has no rales.   Abdominal: Soft. Bowel sounds are normal. He exhibits no distension. There is no tenderness. There is no guarding.   Well healed Chevron incision. Small bulging right above the umbilicus, umbilical hernia - small, reducible.   Musculoskeletal: Normal range of motion. He exhibits no edema (1+ BLE ).   Lymphadenopathy:     He has no cervical adenopathy.   Neurological: He is alert and oriented to person, place, and time. No cranial nerve deficit.   Skin: Skin is warm and dry. No erythema.   Psychiatric: He has a normal mood and affect. His behavior is normal.   Vitals reviewed.    Lab Results   Component Value Date    BILITOT 0.3 11/14/2018    AST 47 (H) 11/14/2018    ALT 47 (H) 11/14/2018    ALKPHOS 168 (H) 11/14/2018    CREATININE 1.64 (H) 11/14/2018    ALBUMIN 4.5 11/14/2018     Lab Results   Component Value Date    WBC 3.7 11/14/2018    HGB 9.9 (L) 11/14/2018    HCT 30.0 (L) 11/14/2018    HCT 28 (L) 10/20/2017    PLT 65 (LL) 11/14/2018     Lab Results   Component Value Date    TACROLIMUS <1.5 (L) 03/04/2018    CYCLOSPORINE <10 (L) 06/02/2018    SIROLIMUSLEV 5.6 11/14/2018       Assessment/Plan:   29 yo male s/p OLT with complicated course including abnormal liver tests, volume overload, renal insufficiency, biliary anastomotic strictures, thrombocytopenia and chronic abdominal pain.    Patient has been doing well.    Recommendation(s):  - advised to check in with Nephrology regarding need for Renvela and Bicarb  - small umbilical hernia noted, no need for repair, if worse, to let me know  - continue sirolimus    Maintenance:  - Instructed to f/u with PCP for regular health maintenance care including cancer screenings and BMD  - Reviewed need to avoid sun exposure with use of sunblock, hats,  long sleeves related to increased risk of skin cancers  - Recommend f/u with Dermatology for annual skin checks  - continue anti-hypertensive agents, and check with PCP, goal /80 mm Hg  - post liver transplant counseling    A total of 40 minutes were spent face-to-face with the patient during this encounter and over half of that time was spent on counseling and coordination of care.  We discussed in depth the nature of the patient's immunocompromised status, liver graft function, immunosuppression, preventative measures and the management plan in details. I also educated the patient about lifestyle modifications which may improve hepatic steatosis, overweight/obesity, insulin resistance and high blood pressure issues. I have provided the patient with an opportunity to ask questions and have all questions answered to his satisfaction.     Georgina Rogers MD         [facility] Patient Status  Functional Status: 80% - Normal activity with effort: some symptoms of disease  Physical Capacity: Limited Mobility

## 2019-01-07 NOTE — LETTER
January 7, 2019                     [facility]  [address]  [facility] [address]  Phone: [phone]   Patient: Jhonny Diana   MR Number: [id number]   YOB: 1990   Date of Visit: 1/7/2019       Dear      Thank you for referring Jhonny Diana to me for evaluation. Attached you will find relevant portions of my assessment and plan of care.    If you have questions, please do not hesitate to call me. I look forward to following Jhonny Diana along with you.    Sincerely,    Georgina Rogers MD    Enclosure    If you would like to receive this communication electronically, please contact [email or web address] or [phone] to request [employer] access.    [employer] is a tool which provides read-only access to select patient information with whom you have a relationship. Its easy to use and provides real time access to review your patients record including encounter summaries, notes, results, and demographic information.    If you feel you have received this communication in error or would no longer like to receive these types of communications, please e-mail [email or web address]

## 2019-01-07 NOTE — PATIENT INSTRUCTIONS
- please see Kidney specialist to discuss Renvela and Sodium Bicarb requirement  - please inform Dr. Rogers if abd hernia is getting worse    Maintenance:  - Instructed to f/u with PCP for regular health maintenance care including cancer screenings and BMD  - Reviewed need to avoid sun exposure with use of sunblock, hats, long sleeves related to increased risk of skin cancers  - Recommend f/u with Dermatology for annual skin checks  - continue anti-hypertensive agents, and check with PCP, goal /80 mm Hg  - post liver transplant counseling

## 2019-01-08 LAB
EXT ALBUMIN: 4.4
EXT ALKALINE PHOSPHATASE: 138
EXT ALT: 34
EXT AST: 16
EXT BASOPHIL%: 1
EXT BILIRUBIN TOTAL: 0.6
EXT BUN: 27
EXT CALCIUM: 9.2
EXT CHLORIDE: 111
EXT CO2: 19
EXT CREATININE: 1.6 MG/DL
EXT EOSINOPHIL%: 4
EXT GLUCOSE: 83
EXT HEMATOCRIT: 37.8
EXT HEMOGLOBIN: 12.8
EXT LYMPH%: 28
EXT MONOCYTES%: 12
EXT PLATELETS: 42
EXT POTASSIUM: 4.5
EXT PROTEIN TOTAL: 7.7
EXT SEGS%: 55
EXT SIROLIMUS LVL: 11.7
EXT SODIUM: 146 MMOL/L
EXT WBC: 3.3

## 2019-01-09 ENCOUNTER — TELEPHONE (OUTPATIENT)
Dept: TRANSPLANT | Facility: CLINIC | Age: 29
End: 2019-01-09

## 2019-01-09 NOTE — TELEPHONE ENCOUNTER
----- Message from Georgina Rogers MD sent at 1/8/2019  2:31 PM CST -----  Liver transplant blood tests reviewed. Labs stable, sirolimus level 11.7. Please repeat labs in 2 weeks.

## 2019-01-09 NOTE — TELEPHONE ENCOUNTER
Called Jhonny and reviewed stable labs. We discussed that his Rapamune level was elevated. Dr Rogers would like repeat labs in 2 weeks 1/21/19. He states he understands.

## 2019-02-11 ENCOUNTER — TELEPHONE (OUTPATIENT)
Dept: TRANSPLANT | Facility: CLINIC | Age: 29
End: 2019-02-11

## 2019-02-13 ENCOUNTER — PATIENT MESSAGE (OUTPATIENT)
Dept: TRANSPLANT | Facility: CLINIC | Age: 29
End: 2019-02-13

## 2019-02-13 ENCOUNTER — TELEPHONE (OUTPATIENT)
Dept: TRANSPLANT | Facility: CLINIC | Age: 29
End: 2019-02-13

## 2019-02-13 NOTE — TELEPHONE ENCOUNTER
Randee Mrs Diana,    We have been trying to contact both you and Jhonny about the overdue labs with no return response. We need to have a phone contact number where the patient and/or caregiver will either answer our call or return them in a timely manner. I will have to verify that the ER will let him come in just for labs. Is it possible that Jhonny can take his Rapamune around 7-8 am so that he can then get labs earlier in the morning before he starts work? I really need a correct contact number for Jhonny so I can call and discuss this with him.    Thanks,    SISSY Calvo

## 2019-02-14 ENCOUNTER — TELEPHONE (OUTPATIENT)
Dept: TRANSPLANT | Facility: CLINIC | Age: 29
End: 2019-02-14

## 2019-02-14 ENCOUNTER — PATIENT MESSAGE (OUTPATIENT)
Dept: TRANSPLANT | Facility: CLINIC | Age: 29
End: 2019-02-14

## 2019-02-14 NOTE — TELEPHONE ENCOUNTER
----- Message from Elian Morgan MD sent at 2/14/2019  4:23 PM CST -----  Regarding: RE: Pt of Dr Rogers with an issue  Good plan  Can we get a CT?    ----- Message -----  From: Lubna Agustin RN  Sent: 2/14/2019  10:08 AM  To: Elian Morgan MD  Subject: Pt of Dr Rogers with an issue                      Hi Dr Morgan,    Mr Diana is a pt of Dr Rogers. Not the most compliant, but his mother sent a msg and states he has a hernia and wants to see Dr López about it. We do not have any imaging that shows a hernia. Can we order a scan to see if he actually ahs one before moving forward?    Thanks,  Lubna Freeman

## 2019-02-15 ENCOUNTER — TELEPHONE (OUTPATIENT)
Dept: TRANSPLANT | Facility: CLINIC | Age: 29
End: 2019-02-15

## 2019-02-15 ENCOUNTER — PATIENT MESSAGE (OUTPATIENT)
Dept: TRANSPLANT | Facility: CLINIC | Age: 29
End: 2019-02-15

## 2019-02-15 NOTE — TELEPHONE ENCOUNTER
Randee Diana,        Lab orders have been faxed to [facility] for 2/16/19. I talked with Dr Morgan who is covering for Dr Rogers and Dr López, they both recommend that Jhonny get a Ct scan of the abdomen before he sees Dr López in clinic,that way Dr López will know if surgery is needed at this time. Please let me know if [facility] or [facility] is okay so that I can fax orders.        When Jhonny saw Dr Rogers in January he had some recommendations that I want to make sure he has followed through with.    1. Follow up with his Nephrologist to evaluate need for Renvela and Bicarb  2. Follow up with Primary Care Doctor for health maintenance, cancer screens, and yearly bone density scan. Also to manage non-transplant related issues.  3. Establish with a Dermatologist for yearly skin checks.     Thanks,    SISSY Calvo

## 2019-02-15 NOTE — TELEPHONE ENCOUNTER
Faxed lab orders to [facility] per his mother's request. She states he will get labs Saturday.

## 2019-02-17 ENCOUNTER — NURSE TRIAGE (OUTPATIENT)
Dept: ADMINISTRATIVE | Facility: CLINIC | Age: 29
End: 2019-02-17

## 2019-02-17 NOTE — TELEPHONE ENCOUNTER
"    Reason for Disposition   [1] New-onset hernia suspected (reducible bulge in groin or abdomen; non-tender) AND [2] NO pain or vomiting    Additional Information   Negative: [1] Constant abdominal pain AND [2] present > 2 hours  (NO pain or tenderness of hernia)     Only when walking    Answer Assessment - Initial Assessment Questions  1. ONSET:  "When did this first appear?"      About 3 weeks ago  2. APPEARANCE: "What does it look like?"      Protrudes at the naval  3. SIZE: "How big is it?" (inches, cm or compare to coins, fruit)      Golf ball  4. LOCATION: "Where exactly is the hernia located?"      naval  5. PATTERN: "Does the swelling come and go, or has it been constant since it started?"      Depends on pposition  6. PAIN: "Is there any pain?" If so, ask: "How bad is it?"  (Scale 1-10; or mild, moderate, severe)      When standing 7  7. DIAGNOSIS: "Have you been seen by a doctor for this?" "Did the doctor diagnose you as having a hernia?"      3 weeks ago  8. OTHER SYMPTOMS: "Do you have any other symptoms?" (e.g., fever, abdominal pain, vomiting)      no  9. PREGNANCY: "Is there any chance you are pregnant?" "When was your last menstrual period?"      n/a    Protocols used: [facility] HERNIA-A-      "

## 2019-02-22 ENCOUNTER — TELEPHONE (OUTPATIENT)
Dept: TRANSPLANT | Facility: CLINIC | Age: 29
End: 2019-02-22

## 2019-02-22 NOTE — TELEPHONE ENCOUNTER
Returned call and verified appt for CT abd    Randee Felix and Jhonny,    CT Abdomen without contrast to evaluate your hernia is scheduled for 2/27/19 at 1130. You will need to be nothing by mouth at midnight. If you need to reschedule the appt please call [facility] scheduling. Also, let me know when you will be completing labs.    Thanks,    Lubna

## 2019-02-22 NOTE — TELEPHONE ENCOUNTER
CT abd without contrast order faxed to [facility] scheduling to evaluate pt c/o hernia.

## 2019-02-22 NOTE — TELEPHONE ENCOUNTER
----- Message from Savanna Hernandez sent at 2/22/2019  2:45 PM CST -----  Contact: [facility]   Needs Advice    Reason for call:  Needs clarity on labs feb 27th 11:30am         Communication Preference: jc [phone]    Additional Information:  n/a

## 2019-03-06 ENCOUNTER — TELEPHONE (OUTPATIENT)
Dept: TRANSPLANT | Facility: CLINIC | Age: 29
End: 2019-03-06

## 2019-03-06 NOTE — TELEPHONE ENCOUNTER
Received the ct abd report from [facility] to eval hernia. Copy sent to Dr Rogers for review. Pending receipt of disc to submit to Radiology.

## 2019-03-25 LAB
ALBUMIN SERPL-MCNC: 4.2 G/DL (ref 3.5–5.5)
ALBUMIN/GLOB SERPL: 1.3 {RATIO} (ref 1.2–2.2)
ALP SERPL-CCNC: 185 IU/L (ref 39–117)
ALT SERPL-CCNC: 33 IU/L (ref 0–44)
AST SERPL-CCNC: 43 IU/L (ref 0–40)
BASOPHILS # BLD AUTO: 0 X10E3/UL (ref 0–0.2)
BASOPHILS NFR BLD AUTO: 0 %
BILIRUB DIRECT SERPL-MCNC: 0.14 MG/DL (ref 0–0.4)
BILIRUB SERPL-MCNC: 0.5 MG/DL (ref 0–1.2)
BUN SERPL-MCNC: 21 MG/DL (ref 6–20)
BUN/CREAT SERPL: 14 (ref 9–20)
CALCIUM SERPL-MCNC: 8.9 MG/DL (ref 8.7–10.2)
CHLORIDE SERPL-SCNC: 107 MMOL/L (ref 96–106)
CO2 SERPL-SCNC: 20 MMOL/L (ref 20–29)
CREAT SERPL-MCNC: 1.54 MG/DL (ref 0.76–1.27)
EOSINOPHIL # BLD AUTO: 0.5 X10E3/UL (ref 0–0.4)
EOSINOPHIL NFR BLD AUTO: 12 %
ERYTHROCYTE [DISTWIDTH] IN BLOOD BY AUTOMATED COUNT: 14.5 % (ref 12.3–15.4)
EXT ALBUMIN: 4.2
EXT ALKALINE PHOSPHATASE: 185
EXT ALT: 33
EXT AST: 43
EXT BASOPHIL%: 0
EXT BILIRUBIN DIRECT: 0.14 MG/DL
EXT BILIRUBIN TOTAL: 0.5
EXT BUN: 21
EXT CALCIUM: 8.9
EXT CHLORIDE: 107
EXT CO2: 20
EXT CREATININE: 1.54 MG/DL
EXT EOSINOPHIL%: 12
EXT GLUCOSE: 92
EXT HEMATOCRIT: 32
EXT HEMOGLOBIN: 11
EXT LYMPH%: 45
EXT MONOCYTES%: 7
EXT PLATELETS: 56
EXT POTASSIUM: 4.9
EXT PROTEIN TOTAL: 7.4
EXT SEGS%: 35
EXT SIROLIMUS LVL: 12.1
EXT SODIUM: 140 MMOL/L
EXT WBC: 3.9
GLOBULIN SER CALC-MCNC: 3.2 G/DL (ref 1.5–4.5)
GLUCOSE SERPL-MCNC: 92 MG/DL (ref 65–99)
HCT VFR BLD AUTO: 32 % (ref 37.5–51)
HGB BLD-MCNC: 11 G/DL (ref 13–17.7)
IMMATURE CELLS: ABNORMAL
LYMPHOCYTES # BLD AUTO: 1.8 X10E3/UL (ref 0.7–3.1)
LYMPHOCYTES NFR BLD AUTO: 45 %
MCH RBC QN AUTO: 32.6 PG (ref 26.6–33)
MCHC RBC AUTO-ENTMCNC: 34.4 G/DL (ref 31.5–35.7)
MCV RBC AUTO: 95 FL (ref 79–97)
METAMYELOCYTES NFR BLD: 1 % (ref 0–0)
MONOCYTES # BLD AUTO: 0.3 X10E3/UL (ref 0.1–0.9)
MONOCYTES NFR BLD AUTO: 7 %
MORPHOLOGY BLD-IMP: ABNORMAL
NEUTROPHILS # BLD AUTO: 1.4 X10E3/UL (ref 1.4–7)
NEUTROPHILS NFR BLD AUTO: 35 %
NRBC BLD AUTO-RTO: 1 % (ref 0–0)
PLATELET # BLD AUTO: 56 X10E3/UL (ref 150–379)
POTASSIUM SERPL-SCNC: 4.9 MMOL/L (ref 3.5–5.2)
PROT SERPL-MCNC: 7.4 G/DL (ref 6–8.5)
RBC # BLD AUTO: 3.37 X10E6/UL (ref 4.14–5.8)
SIROLIMUS BLD-MCNC: 12.1 NG/ML (ref 3–20)
SODIUM SERPL-SCNC: 140 MMOL/L (ref 134–144)
WBC # BLD AUTO: 3.9 X10E3/UL (ref 3.4–10.8)

## 2019-03-27 ENCOUNTER — TELEPHONE (OUTPATIENT)
Dept: TRANSPLANT | Facility: CLINIC | Age: 29
End: 2019-03-27

## 2019-03-27 DIAGNOSIS — Z94.4 LIVER TRANSPLANTED: ICD-10-CM

## 2019-03-27 RX ORDER — SIROLIMUS 1 MG/1
3 TABLET, FILM COATED ORAL DAILY
Qty: 90 TABLET | Refills: 12 | Status: ON HOLD | OUTPATIENT
Start: 2019-03-27 | End: 2019-11-30 | Stop reason: SDUPTHER

## 2019-03-27 NOTE — TELEPHONE ENCOUNTER
----- Message from Georgina Rogers MD sent at 3/25/2019 11:51 AM CDT -----  AST: 43, and ALT: 33. Sirolimus 12.1, please advise patient to reduce sirolimus to 3 mg daily, please repeat labs in 2 weeks w/ PETH. Please obtain CD disc of his CT abd imaging for Dr. López to evaluate his hernia. T

## 2019-03-27 NOTE — TELEPHONE ENCOUNTER
Attempted to call pt and no  set up number just hung up. Called his mother and reviewed labs and med adjustment. Emailed copy of repeat lab order for 4/8/19. Asked pt to provide a proper contact number.    Also called [facility] to ask to have CT scan disc mailed. THis is second time. She stated it will be in the mail today.

## 2019-04-09 ENCOUNTER — TELEPHONE (OUTPATIENT)
Dept: TRANSPLANT | Facility: CLINIC | Age: 29
End: 2019-04-09

## 2019-04-09 NOTE — TELEPHONE ENCOUNTER
Left voicemail for pt that Dr. López would like to see him this Friday to discuss hernia repair. Asked pt to return call to discuss.

## 2019-04-11 ENCOUNTER — PATIENT MESSAGE (OUTPATIENT)
Dept: TRANSPLANT | Facility: CLINIC | Age: 29
End: 2019-04-11

## 2019-04-11 ENCOUNTER — TELEPHONE (OUTPATIENT)
Dept: TRANSPLANT | Facility: CLINIC | Age: 29
End: 2019-04-11

## 2019-04-12 ENCOUNTER — OFFICE VISIT (OUTPATIENT)
Dept: TRANSPLANT | Facility: CLINIC | Age: 29
End: 2019-04-12
Payer: COMMERCIAL

## 2019-04-12 VITALS
SYSTOLIC BLOOD PRESSURE: 184 MMHG | WEIGHT: 153.44 LBS | HEIGHT: 66 IN | TEMPERATURE: 100 F | BODY MASS INDEX: 24.66 KG/M2 | HEART RATE: 103 BPM | OXYGEN SATURATION: 100 % | RESPIRATION RATE: 18 BRPM | DIASTOLIC BLOOD PRESSURE: 97 MMHG

## 2019-04-12 DIAGNOSIS — K42.9 UMBILICAL HERNIA WITHOUT OBSTRUCTION AND WITHOUT GANGRENE: ICD-10-CM

## 2019-04-12 DIAGNOSIS — Z79.60 LONG-TERM USE OF IMMUNOSUPPRESSANT MEDICATION: ICD-10-CM

## 2019-04-12 DIAGNOSIS — Z94.4 S/P LIVER TRANSPLANT: Primary | ICD-10-CM

## 2019-04-12 DIAGNOSIS — Z51.81 ENCOUNTER FOR THERAPEUTIC DRUG MONITORING: ICD-10-CM

## 2019-04-12 PROCEDURE — 99999 PR PBB SHADOW E&M-EST. PATIENT-LVL III: ICD-10-PCS | Mod: PBBFAC,,,

## 2019-04-12 PROCEDURE — 99215 PR OFFICE/OUTPT VISIT, EST, LEVL V, 40-54 MIN: ICD-10-PCS | Mod: 24,S$GLB,, | Performed by: TRANSPLANT SURGERY

## 2019-04-12 PROCEDURE — 99999 PR PBB SHADOW E&M-EST. PATIENT-LVL III: CPT | Mod: PBBFAC,,,

## 2019-04-12 PROCEDURE — 99215 OFFICE O/P EST HI 40 MIN: CPT | Mod: 24,S$GLB,, | Performed by: TRANSPLANT SURGERY

## 2019-04-12 NOTE — PROGRESS NOTES
Transplant Surgery  Liver Transplant Recipient Follow-up    Original Referring Physician: ZANE Nieto  Current Corresponding Physician:     Chief Complaint: Jhonny is here for follow up of his liver transplant performed 10/19/2017 for the primary diagnosis (UNOS) of Acute Alcoholic Hepatitis    ORGAN: LIVER  Whole or Partial: whole liver  Donor Type:  - brain death  PHS Increased Risk: no  Donor CMV Status: Positive  Donor HCV Status: Negative  Donor HBcAb: Negative  Donor HBV ANITA: Negative  Donor HCV ANITA: Negative    Biliary Anastomosis: end to end  Arterial Anatomy: standard  IVC reconstruction: end to end ivc  Portal vein status: patent    Subjective:     History of Present Illness: He has had the following complications since transplant: discomfort related to hernia.  no symptoms of incarceration.  The noted complications need to be addressed more thoroughly today.    Interval History: Currently, he is doing well.  Current complaints include abdominal pain.  Jhonny is here for management of his immunosuppression medication.    Review of Systems   Constitutional: Negative for activity change, appetite change, chills and fever.   HENT: Negative for congestion, nosebleeds, sinus pressure, sore throat and voice change.    Eyes: Negative for pain and visual disturbance.   Respiratory: Negative for cough and shortness of breath.    Cardiovascular: Negative for palpitations and leg swelling.   Gastrointestinal: Negative for abdominal distention, abdominal pain, constipation, diarrhea, nausea and vomiting.   Endocrine: Negative for cold intolerance and heat intolerance.   Genitourinary: Negative for dysuria, flank pain, frequency and hematuria.   Musculoskeletal: Negative for back pain and gait problem.   Skin: Negative for color change, pallor and wound.   Allergic/Immunologic: Negative for food allergies.   Neurological: Negative for dizziness, seizures, numbness and headaches.   Hematological: Negative  for adenopathy.   Psychiatric/Behavioral: Negative for agitation, behavioral problems, hallucinations and sleep disturbance.       Objective:     Physical Exam   Constitutional: He is oriented to person, place, and time. He appears well-developed and well-nourished.   HENT:   Head: Normocephalic and atraumatic.   Eyes: Pupils are equal, round, and reactive to light. EOM are normal.   Neck: No JVD present.   Cardiovascular: Normal rate, regular rhythm and normal heart sounds.   Pulmonary/Chest: Effort normal and breath sounds normal. No stridor.   Abdominal: Soft. He exhibits no distension. There is no tenderness.   Small 3cm umbilical, incisional, reducible hernia. Mild tenderness     Musculoskeletal: Normal range of motion. He exhibits no edema.   Neurological: He is alert and oriented to person, place, and time.   Skin: Skin is warm and dry.   Psychiatric: He has a normal mood and affect. His behavior is normal.     Lab Results   Component Value Date    BILITOT 0.5 03/22/2019    AST 43 (H) 03/22/2019    ALT 33 03/22/2019    ALKPHOS 185 (H) 03/22/2019    CREATININE 1.54 (H) 03/22/2019    ALBUMIN 4.2 03/22/2019     Lab Results   Component Value Date    WBC 3.9 03/22/2019    HGB 11.0 (L) 03/22/2019    HCT 32.0 (L) 03/22/2019    HCT 28 (L) 10/20/2017    PLT 56 (LL) 03/22/2019     Lab Results   Component Value Date    TACROLIMUS <1.5 (L) 03/04/2018    CYCLOSPORINE <10 (L) 06/02/2018    SIROLIMUSLEV 12.1 03/22/2019       Assessment/Plan:          · S/P liver transplant.  · Reducible hernia, with symptoms (mild), would recommend elective repair, here or at home. First needs to be switched back to CNI and be off of sirolimus for 8-10 weeks.   · Immunosuppression, nonsmoker, no prostatism, no constipation - quoted him approx 15% recurrence risk.   ·   · Chronic immunosuppressive medications for rejection prophylaxis at target.  Plan: needs to be switched back to CNI, if he will pursue hernia surgery. .  · Continue  monitoring symptoms, labs and drug levels for drug-related toxicity and side effects.  ·     MD EDU Rosas Jr Patient Status  Functional Status: 100% - Normal, no complaints, no evidence of disease  Physical Capacity: No Limitations

## 2019-04-12 NOTE — Clinical Note
I saw Mr. Diana today. He has reducible umbilical hernia with symptoms. He desires repair.  I offered him repair here as well as offered him permission to have it repaired closer to home for convenience.  He is on rapa and needs that held for 8-10 weeks prior to hernia surgery. He said he'll let us know what he thinks about where he wants it fixed.  ThanksDS

## 2019-04-25 ENCOUNTER — TELEPHONE (OUTPATIENT)
Dept: TRANSPLANT | Facility: CLINIC | Age: 29
End: 2019-04-25

## 2019-04-25 NOTE — TELEPHONE ENCOUNTER
----- Message from Lubna Ly MA sent at 4/25/2019  2:17 PM CDT -----  FYI:    4-12-19@  9:58am Friday  [phone]  Patient states he is at [facility] and will call me back.  I asked him if he did lab and if not when will he be able to go.  He stated Wednesday, 4-17-19 4-17-19@ 10:30am Wednesday [phone]  Left message answer machine regarding if he had labs drawn and when I can schedule his appointment.                @   1:23pm  Wednesday [phone]  Left message answer machine regarding lab work he needs and appointment with Dr. Rogers.    4-18-19@  10:26am  Thursday [phone]  Left message answer machine regarding lab work he needs and appointment with Dr. Rogers.    4-22-19@  1:10pm  Monday [phone]  Left message answer machine regarding lab work he needs and appointment with Dr. Rogers    4-23-19@ 10:33am  Tuesday [phone]  Left message answer machine regarding lab work he needs and appointment with Dr. Rogers    4-24-19@  8:36am  Wednesday [phone]  Left message answer machine regarding lab work he needs and appointment with Dr. Rogers.  I scheduled the appointment with Dr. Rogers on Thursday, 6-6-19 @ 2:00pm and mailed the appointment.

## 2019-04-29 ENCOUNTER — TELEPHONE (OUTPATIENT)
Dept: TRANSPLANT | Facility: CLINIC | Age: 29
End: 2019-04-29

## 2019-04-29 NOTE — TELEPHONE ENCOUNTER
----- Message from Georgina Rogers MD sent at 4/12/2019  2:07 PM CDT -----  Ok, when he decides to have surgery, will switch him to cyclo before the surgery and until the wound is healed.  ----- Message -----  From: Alin López Jr., MD  Sent: 4/12/2019  11:49 AM  To: Georgina Rogers MD    I saw Mr. Diana today. He has reducible umbilical hernia with symptoms. He desires repair.  I offered him repair here as well as offered him permission to have it repaired closer to home for convenience.  He is on rapa and needs that held for 8-10 weeks prior to hernia surgery. He said he'll let us know what he thinks about where he wants it fixed.    Thanks  DS

## 2019-04-29 NOTE — TELEPHONE ENCOUNTER
----- Message from Carey Lutz RN sent at 4/15/2019  9:11 AM CDT -----      ----- Message -----  From: Alin López Jr., MD  Sent: 4/12/2019  11:49 AM  To: Carey Lutz RN    I saw Mr. Diana today. He has reducible umbilical hernia with symptoms. He desires repair.  I offered him repair here as well as offered him permission to have it repaired closer to home for convenience.  He is on rapa and needs that held for 8-10 weeks prior to hernia surgery. He said he'll let us know what he thinks about where he wants it fixed.    Thanks  DS

## 2019-05-03 LAB
ALBUMIN SERPL-MCNC: 4.4 G/DL (ref 3.5–5.5)
ALBUMIN/GLOB SERPL: 1.4 {RATIO} (ref 1.2–2.2)
ALP SERPL-CCNC: 155 IU/L (ref 39–117)
ALT SERPL-CCNC: 24 IU/L (ref 0–44)
AST SERPL-CCNC: 37 IU/L (ref 0–40)
BASOPHILS # BLD AUTO: 0 X10E3/UL (ref 0–0.2)
BASOPHILS NFR BLD AUTO: 1 %
BILIRUB DIRECT SERPL-MCNC: 0.2 MG/DL (ref 0–0.4)
BILIRUB SERPL-MCNC: 0.6 MG/DL (ref 0–1.2)
BUN SERPL-MCNC: 17 MG/DL (ref 6–20)
BUN/CREAT SERPL: 11 (ref 9–20)
CALCIUM SERPL-MCNC: 9 MG/DL (ref 8.7–10.2)
CHLORIDE SERPL-SCNC: 106 MMOL/L (ref 96–106)
CO2 SERPL-SCNC: 19 MMOL/L (ref 20–29)
CREAT SERPL-MCNC: 1.61 MG/DL (ref 0.76–1.27)
EOSINOPHIL # BLD AUTO: 0.3 X10E3/UL (ref 0–0.4)
EOSINOPHIL NFR BLD AUTO: 5 %
ERYTHROCYTE [DISTWIDTH] IN BLOOD BY AUTOMATED COUNT: 14.9 % (ref 12.3–15.4)
EXT ALBUMIN: 4.4
EXT ALKALINE PHOSPHATASE: 155
EXT ALT: 24
EXT AST: 37
EXT BASOPHIL%: 1
EXT BILIRUBIN DIRECT: 0.2 MG/DL
EXT BILIRUBIN TOTAL: 0.6
EXT BUN: 17
EXT CALCIUM: 9
EXT CHLORIDE: 106
EXT CO2: 19
EXT CREATININE: 1.61 MG/DL
EXT EOSINOPHIL%: 5
EXT GLUCOSE: 79
EXT HEMATOCRIT: 34.2
EXT HEMOGLOBIN: 11.7
EXT LYMPH%: 27
EXT MONOCYTES%: 8
EXT PLATELETS: 73
EXT POTASSIUM: 4.4
EXT PROTEIN TOTAL: 7.6
EXT SEGS%: 59
EXT SIROLIMUS LVL: 14.5
EXT SODIUM: 141 MMOL/L
EXT WBC: 4.7
GLOBULIN SER CALC-MCNC: 3.2 G/DL (ref 1.5–4.5)
GLUCOSE SERPL-MCNC: 79 MG/DL (ref 65–99)
HCT VFR BLD AUTO: 34.2 % (ref 37.5–51)
HGB BLD-MCNC: 11.7 G/DL (ref 13–17.7)
IMM GRANULOCYTES # BLD AUTO: 0 X10E3/UL (ref 0–0.1)
IMM GRANULOCYTES NFR BLD AUTO: 0 %
LYMPHOCYTES # BLD AUTO: 1.3 X10E3/UL (ref 0.7–3.1)
LYMPHOCYTES NFR BLD AUTO: 27 %
MCH RBC QN AUTO: 33.4 PG (ref 26.6–33)
MCHC RBC AUTO-ENTMCNC: 34.2 G/DL (ref 31.5–35.7)
MCV RBC AUTO: 98 FL (ref 79–97)
MONOCYTES # BLD AUTO: 0.4 X10E3/UL (ref 0.1–0.9)
MONOCYTES NFR BLD AUTO: 8 %
MORPHOLOGY BLD-IMP: ABNORMAL
NEUTROPHILS # BLD AUTO: 2.8 X10E3/UL (ref 1.4–7)
NEUTROPHILS NFR BLD AUTO: 59 %
PLATELET # BLD AUTO: 73 X10E3/UL (ref 150–379)
POTASSIUM SERPL-SCNC: 4.4 MMOL/L (ref 3.5–5.2)
PROT SERPL-MCNC: 7.6 G/DL (ref 6–8.5)
RBC # BLD AUTO: 3.5 X10E6/UL (ref 4.14–5.8)
SIROLIMUS BLD-MCNC: 14.5 NG/ML (ref 3–20)
SODIUM SERPL-SCNC: 141 MMOL/L (ref 134–144)
SPECIMEN STATUS REPORT: NORMAL
WBC # BLD AUTO: 4.7 X10E3/UL (ref 3.4–10.8)

## 2019-05-06 ENCOUNTER — TELEPHONE (OUTPATIENT)
Dept: TRANSPLANT | Facility: CLINIC | Age: 29
End: 2019-05-06

## 2019-05-06 NOTE — LETTER
May 6, 2019    Jhonny Diana  [address] [place], [place] [id number]          Dear Jhonny Diana:  MRN: [id number]    This is a follow up to your recent labs, your lab results were stable.  There are no medicine changes.  Please have your labs drawn again on Decrease Sirolimus dose to 2mg daily and repeat labs on the week of 5/13/2019.      If you cannot have your labs drawn on the scheduled date, it is your responsibility to call the transplant department to reschedule.  To reschedule or make an appointment, please as to speak to or leave a message for my assistant, Anne Edmonds or Lubna, at [phone].  When leaving a message for Anne Edmonds Angela or myself, we ask that you leave a brief message regarding your request.    Sincerely,      Lubna Agustin, RN, BSN, CCTC  Your Liver Transplant Coordinator    [facility]  [address] [place] [id number]  [phone]
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
## 2019-05-23 ENCOUNTER — TELEPHONE (OUTPATIENT)
Dept: TRANSPLANT | Facility: CLINIC | Age: 29
End: 2019-05-23

## 2019-05-23 NOTE — TELEPHONE ENCOUNTER
Dr Ken Colindres reviewed your labs. No medication changes. He would like you to do labs the week of 6/3/19.    Thanks,    SISSY Calvo

## 2019-05-23 NOTE — TELEPHONE ENCOUNTER
----- Message from Georgina Rogers MD sent at 5/23/2019  2:43 PM CDT -----  Liver transplant blood tests reviewed. Labs stable, sirolimus level is supratherapeutic, please decrease sirolimus to 2 mg daily, please repeat labs in 2 weeks.

## 2019-05-27 ENCOUNTER — TELEPHONE (OUTPATIENT)
Dept: TRANSPLANT | Facility: CLINIC | Age: 29
End: 2019-05-27

## 2019-05-27 NOTE — TELEPHONE ENCOUNTER
Called and stated he is having new onset of swelling in both feet and low grade temps. States occ feels winded when he talks a lot. Pt will check labs tomorrow.

## 2019-06-01 LAB
ALBUMIN SERPL-MCNC: 4.4 G/DL (ref 3.5–5.5)
ALBUMIN/GLOB SERPL: 1.3 {RATIO} (ref 1.2–2.2)
ALP SERPL-CCNC: 219 IU/L (ref 39–117)
ALT SERPL-CCNC: 19 IU/L (ref 0–44)
APPEARANCE UR: CLEAR
AST SERPL-CCNC: 40 IU/L (ref 0–40)
BACTERIA #/AREA URNS HPF: NORMAL /[HPF]
BASOPHILS # BLD AUTO: 0 X10E3/UL (ref 0–0.2)
BASOPHILS NFR BLD AUTO: 1 %
BILIRUB DIRECT SERPL-MCNC: 0.11 MG/DL (ref 0–0.4)
BILIRUB SERPL-MCNC: 0.4 MG/DL (ref 0–1.2)
BILIRUB UR QL STRIP: NEGATIVE
BNP SERPL-MCNC: 104 PG/ML (ref 0–100)
BUN SERPL-MCNC: 16 MG/DL (ref 6–20)
BUN/CREAT SERPL: 9 (ref 9–20)
CALCIUM SERPL-MCNC: 8.6 MG/DL (ref 8.7–10.2)
CHLORIDE SERPL-SCNC: 108 MMOL/L (ref 96–106)
CO2 SERPL-SCNC: 18 MMOL/L (ref 20–29)
COLOR UR: YELLOW
CREAT SERPL-MCNC: 1.81 MG/DL (ref 0.76–1.27)
EOSINOPHIL # BLD AUTO: 0.2 X10E3/UL (ref 0–0.4)
EOSINOPHIL NFR BLD AUTO: 5 %
EPI CELLS #/AREA URNS HPF: NORMAL /HPF (ref 0–10)
ERYTHROCYTE [DISTWIDTH] IN BLOOD BY AUTOMATED COUNT: 13.9 % (ref 12.3–15.4)
GLOBULIN SER CALC-MCNC: 3.3 G/DL (ref 1.5–4.5)
GLUCOSE SERPL-MCNC: 87 MG/DL (ref 65–99)
GLUCOSE UR QL: NEGATIVE
HCT VFR BLD AUTO: 33 % (ref 37.5–51)
HGB BLD-MCNC: 11.3 G/DL (ref 13–17.7)
HGB UR QL STRIP: NEGATIVE
IMM GRANULOCYTES # BLD AUTO: 0 X10E3/UL (ref 0–0.1)
IMM GRANULOCYTES NFR BLD AUTO: 0 %
KETONES UR QL STRIP: NEGATIVE
LEUKOCYTE ESTERASE UR QL STRIP: NEGATIVE
LYMPHOCYTES # BLD AUTO: 1 X10E3/UL (ref 0.7–3.1)
LYMPHOCYTES NFR BLD AUTO: 26 %
MCH RBC QN AUTO: 32.8 PG (ref 26.6–33)
MCHC RBC AUTO-ENTMCNC: 34.2 G/DL (ref 31.5–35.7)
MCV RBC AUTO: 96 FL (ref 79–97)
MICRO URNS: ABNORMAL
MONOCYTES # BLD AUTO: 0.4 X10E3/UL (ref 0.1–0.9)
MONOCYTES NFR BLD AUTO: 9 %
MORPHOLOGY BLD-IMP: ABNORMAL
MUCOUS THREADS URNS QL MICRO: PRESENT
NEUTROPHILS # BLD AUTO: 2.4 X10E3/UL (ref 1.4–7)
NEUTROPHILS NFR BLD AUTO: 59 %
NITRITE UR QL STRIP: NEGATIVE
PH UR STRIP: 7 [PH] (ref 5–7.5)
PLATELET # BLD AUTO: 73 X10E3/UL (ref 150–450)
POTASSIUM SERPL-SCNC: 4.5 MMOL/L (ref 3.5–5.2)
PROT SERPL-MCNC: 7.7 G/DL (ref 6–8.5)
PROT UR QL STRIP: ABNORMAL
RBC # BLD AUTO: 3.44 X10E6/UL (ref 4.14–5.8)
RBC #/AREA URNS HPF: NORMAL /HPF (ref 0–2)
SODIUM SERPL-SCNC: 142 MMOL/L (ref 134–144)
SP GR UR: 1.02 (ref 1–1.03)
UROBILINOGEN UR STRIP-MCNC: 0.2 MG/DL (ref 0.2–1)
WBC # BLD AUTO: 4.1 X10E3/UL (ref 3.4–10.8)
WBC #/AREA URNS HPF: NORMAL /HPF (ref 0–5)

## 2019-06-04 LAB
APPEARANCE, UA: ABNORMAL
BLOOD URINE: ABNORMAL
BNP (B-TYPE NATRIURETIC PEP): 104
COLOR URINE: YELLOW
EXT ALBUMIN: 4.4
EXT ALKALINE PHOSPHATASE: 219
EXT ALT: 19
EXT AST: 40
EXT BACTERIA UA: ABNORMAL
EXT BASOPHIL%: 1
EXT BILIRUBIN DIRECT: 0.11 MG/DL
EXT BILIRUBIN TOTAL: 0.4
EXT BUN: 16
EXT CALCIUM: 8.6
EXT CHLORIDE: 108
EXT CO2: 18
EXT CREATININE: 1.81 MG/DL
EXT EOSINOPHIL%: 5
EXT GLUCOSE: 87
EXT HEMATOCRIT: 33
EXT HEMOGLOBIN: 11.3
EXT LYMPH%: 26
EXT MONOCYTES%: 9
EXT PLATELETS: 73
EXT POTASSIUM: 4.5
EXT PROTEIN TOTAL: 7.7
EXT PROTEIN UA: ABNORMAL
EXT RBC UA: 0.2
EXT SEGS%: 59
EXT SODIUM: 142 MMOL/L
EXT WBC UA: ABNORMAL
EXT WBC: 4.1
GLUCOSE IN URINE: ABNORMAL
KETONES UR QL STRIP: NEGATIVE
NITRITE UR QL STRIP: 0.2
PH UR STRIP: 7 [PH] (ref 4.5–8)
SIROLIMUS: ABNORMAL
SP GR UR STRIP: 1.02 (ref 1–1.03)

## 2019-06-10 ENCOUNTER — TELEPHONE (OUTPATIENT)
Dept: TRANSPLANT | Facility: CLINIC | Age: 29
End: 2019-06-10

## 2019-06-10 NOTE — TELEPHONE ENCOUNTER
Robert Barry,    We are still waiting for your Sirolimus result from the lab. Can you verify which lab you used so that I can call them.     Thanks,    SISSY Calvo

## 2019-06-10 NOTE — TELEPHONE ENCOUNTER
----- Message from Georgina Rogers MD sent at 6/5/2019 11:46 AM CDT -----  tacro level is pending, please let me know when his tacro level is back.

## 2019-06-17 ENCOUNTER — PATIENT MESSAGE (OUTPATIENT)
Dept: TRANSPLANT | Facility: CLINIC | Age: 29
End: 2019-06-17

## 2019-06-17 ENCOUNTER — TELEPHONE (OUTPATIENT)
Dept: TRANSPLANT | Facility: CLINIC | Age: 29
End: 2019-06-17

## 2019-06-17 NOTE — TELEPHONE ENCOUNTER
Randee Diana,    Just checking in with you regarding repeat labs on 6/3/2019. I emailed you a copy of the order. I called [facility] and they state you have not had labs completed.. Please let me know as soon as possible about when you will be able to complete labs. Dr Rogers has been asking for them.    Thanks,  SISSY Calvo

## 2019-06-17 NOTE — TELEPHONE ENCOUNTER
Called lab [facility] to get sirolimus level from 5/29, since we have never received. Also called pt's cell phone to contact him that he was supposed to complete labs the week of 6/3/19. The order was emailed to him per his request. Dr Rogers requested that he repeat labs. Will also send a [facility].

## 2019-06-18 ENCOUNTER — TELEPHONE (OUTPATIENT)
Dept: TRANSPLANT | Facility: CLINIC | Age: 29
End: 2019-06-18

## 2019-06-18 ENCOUNTER — PATIENT MESSAGE (OUTPATIENT)
Dept: TRANSPLANT | Facility: CLINIC | Age: 29
End: 2019-06-18

## 2019-06-18 RX ORDER — AMLODIPINE BESYLATE 5 MG/1
TABLET ORAL
Qty: 30 TABLET | Refills: 0 | Status: ON HOLD | OUTPATIENT
Start: 2019-06-18 | End: 2019-11-24

## 2019-06-18 NOTE — TELEPHONE ENCOUNTER
----- Message from Lubna Ly MA sent at 6/18/2019 12:03 PM CDT -----  FYI:    Left message for Mr. Diana to repeat lab work on Monday, 6-24-19.    Encounter in Epic.

## 2019-06-18 NOTE — TELEPHONE ENCOUNTER
Randee Diana,    Dr Rogers reviewed your labs. The lab did not draw the sirolimus level. Dr Rogers would like you to recheck labs the week of 6/24/29. I will fax to your labs and email to you. I also left a VM on your phone. Dr Rogers would like you to see your Primary Care doctor or establish with one for follow up within the next 2 weeks. We can fax records to them if needed. It is very important that you have a local doctor who can see you for non transplant related issues.    Thanks,  Lubna RN        Sent my [facility] msg to pt to let him know to do labs 6/24 and to f/u with his pcp for non transplant related issues.

## 2019-06-20 NOTE — ASSESSMENT & PLAN NOTE
- Maintenance IS with cyclosporine. MMF on hold for infections and neutropenia. cont to check prograf level daily. Assess for toxicity and adjust level as needed   anterior lateral neck). Skin: Negative for color change and rash. Neurological: Negative for weakness and numbness. All other systems reviewed and are negative. Positives and Pertinent negatives as per HPI. Except as noted above in the ROS, all other systems were reviewed/completed and are negative. Physical Exam:  Physical Exam   Constitutional: She is oriented to person, place, and time. She appears well-developed and well-nourished. She is active and cooperative. Non-toxic appearance. HENT:   Head: Normocephalic. Right Ear: External ear normal.   Left Ear: External ear normal.   Nose: Nose normal.   Eyes: Conjunctivae are normal. Right eye exhibits no discharge. Left eye exhibits no discharge. Neck: Normal range of motion. Neck supple. Cardiovascular: Normal rate, regular rhythm and normal heart sounds. Exam reveals no gallop and no friction rub. No murmur heard. Pulses:       Radial pulses are 2+ on the right side. Pulmonary/Chest: Effort normal and breath sounds normal. No stridor. No respiratory distress. She has no wheezes. She has no rales. Abdominal: Soft. Bowel sounds are normal. She exhibits no distension. Musculoskeletal: Normal range of motion. Neurological: She is alert and oriented to person, place, and time. Skin: Skin is warm and dry. She is not diaphoretic. No pallor. Psychiatric: She has a normal mood and affect. Her behavior is normal.   Nursing note and vitals reviewed. MEDICAL DECISION MAKING    Vitals:    Vitals:    06/20/19 1034   BP: 120/81   Pulse: 75   Resp: 16   Temp: 97.3 °F (36.3 °C)   SpO2: 98%   Weight: 125 lb (56.7 kg)   Height: 5' 2\" (1.575 m)       LABS: Labs Reviewed - No data to display     Remainder of labs reviewed and were negative at this time or not returned at the time of this note.     RADIOLOGY:   Non-plain film images suchas CT, Ultrasound and MRI are read by the radiologist. Laura PABLO PA have directly visualized the radiologic plain film image(s) with the below findings:  Interpretation per the Radiologist below, if available at the time of this note:    No orders to display     [address] / ED COURSE:      PROCEDURES:   Procedures    Patient was given:  Medications - No data to display  This is a  34 y.o. female who presents to the emergency department with complaints of right-sided anterior lateral neck pain that started 2 days ago, pain worse with movement. Denies any known injury. States that she woke up with the pain. Pain is worse when she laterally rotates her head to the left and less so when she turns to the right. Denies any posterior neck pain or midline neck pain. Denies any radiation of pain. Pain is 3 out of 10 in severity and again does not radiate. Described as tight. She did take Tylenol yesterday which did help with her symptoms. Has not tried any ice or heat. Denies any focal numbness tingling or weakness. Other than movement no aggravating factors. On exam patient is well-appearing, she does have spasming of her sternocleidomastoid muscle. Patient will be started on anti-inflammatories and muscle relaxers, encouraged ice. No indication for imaging as there is no posterior midline cervical pain. No pain of the midline spine with movement of the neck. Normal peripheral pulses and distal sensation to light touch. Low suspicion for cord disease. Patient will be discharged home, torticollis instructions for home. The patient tolerated their visit well. I have evaluated the patient with physician available for consultation as needed. I have discussed the findings of today's workup with the patient and addressed the patient's questions and concerns. Important warning signs as well as new or worsening symptoms which wouldnecessitate immediate return to the ED were discussed. The plan is to discharge from the ED at this time, and the patient is in stable condition.  The patient acknowledged understanding is agreeable with this plan. CLINICAL IMPRESSION:  1.  Torticollis        DISPOSITION Decision To Discharge 06/20/2019 10:37:40 AM      PATIENT REFERRED TO:  Krystal Butcher MD  [facility] [address]  [phone]    Schedule an appointment as soon as possible for a visit       [facility] Emergency Department  [address] [place] [address]  [phone]  Go to   If symptoms worsen      DISCHARGE MEDICATIONS:  Discharge Medication List as of 6/20/2019 10:43 AM      START taking these medications    Details   cyclobenzaprine (FLEXERIL) 10 MG tablet Take 1 tablet by mouth 3 times daily as needed for Muscle spasms, Disp-30 tablet, R-0Print      naproxen (NAPROSYN) 500 MG tablet Take 1 tablet by mouth 2 times daily for 20 doses, Disp-20 tablet, R-0Print                  (Please note the MDM and HPI sections of this note were completed with [employer] recognition program.  Efforts were made to edit the dictations but occasionally words are mis-transcribed.)    Electronically signed, RACHEL Kent,             RACHEL Martinez  06/20/19 4137

## 2019-06-22 RX ORDER — FAMOTIDINE 20 MG/1
TABLET, FILM COATED ORAL
Qty: 30 TABLET | Refills: 0 | Status: ON HOLD | OUTPATIENT
Start: 2019-06-22 | End: 2019-11-24

## 2019-06-25 LAB
EXT ALBUMIN: 4.4
EXT ALKALINE PHOSPHATASE: 151
EXT ALT: 26
EXT AST: 51
EXT BASOPHIL%: 0
EXT BILIRUBIN DIRECT: 0.36 MG/DL
EXT BILIRUBIN TOTAL: 1
EXT BUN: 18
EXT CALCIUM: 9.2
EXT CHLORIDE: 104
EXT CO2: 17
EXT CREATININE: 1.74 MG/DL
EXT EOSINOPHIL%: 1
EXT GLUCOSE: 73
EXT HEMATOCRIT: 34.6
EXT HEMOGLOBIN: 12
EXT LYMPH%: 14
EXT MONOCYTES%: 11
EXT PLATELETS: 107
EXT POTASSIUM: 3.7
EXT PROTEIN TOTAL: 7.2
EXT SEGS%: 74
EXT SIROLIMUS LVL: 10.7
EXT SODIUM: 137 MMOL/L
EXT WBC: 10.2

## 2019-06-27 LAB
ALBUMIN SERPL-MCNC: 4.4 G/DL (ref 3.5–5.5)
ALBUMIN/GLOB SERPL: 1.6 {RATIO} (ref 1.2–2.2)
ALP SERPL-CCNC: 151 IU/L (ref 39–117)
ALT SERPL-CCNC: 26 IU/L (ref 0–44)
AST SERPL-CCNC: 51 IU/L (ref 0–40)
BASOPHILS # BLD AUTO: 0 X10E3/UL (ref 0–0.2)
BASOPHILS NFR BLD AUTO: 0 %
BILIRUB DIRECT SERPL-MCNC: 0.36 MG/DL (ref 0–0.4)
BILIRUB SERPL-MCNC: 1 MG/DL (ref 0–1.2)
BUN SERPL-MCNC: 18 MG/DL (ref 6–20)
BUN/CREAT SERPL: 10 (ref 9–20)
CALCIUM SERPL-MCNC: 9.2 MG/DL (ref 8.7–10.2)
CHLORIDE SERPL-SCNC: 104 MMOL/L (ref 96–106)
CO2 SERPL-SCNC: 17 MMOL/L (ref 20–29)
CREAT SERPL-MCNC: 1.74 MG/DL (ref 0.76–1.27)
EOSINOPHIL # BLD AUTO: 0.1 X10E3/UL (ref 0–0.4)
EOSINOPHIL NFR BLD AUTO: 1 %
ERYTHROCYTE [DISTWIDTH] IN BLOOD BY AUTOMATED COUNT: 14.8 % (ref 12.3–15.4)
GLOBULIN SER CALC-MCNC: 2.8 G/DL (ref 1.5–4.5)
GLUCOSE SERPL-MCNC: 73 MG/DL (ref 65–99)
HCT VFR BLD AUTO: 34.6 % (ref 37.5–51)
HGB BLD-MCNC: 12 G/DL (ref 13–17.7)
IMM GRANULOCYTES # BLD AUTO: 0 X10E3/UL (ref 0–0.1)
IMM GRANULOCYTES NFR BLD AUTO: 0 %
LYMPHOCYTES # BLD AUTO: 1.5 X10E3/UL (ref 0.7–3.1)
LYMPHOCYTES NFR BLD AUTO: 14 %
MCH RBC QN AUTO: 32.8 PG (ref 26.6–33)
MCHC RBC AUTO-ENTMCNC: 34.7 G/DL (ref 31.5–35.7)
MCV RBC AUTO: 95 FL (ref 79–97)
MONOCYTES # BLD AUTO: 1.2 X10E3/UL (ref 0.1–0.9)
MONOCYTES NFR BLD AUTO: 11 %
NEUTROPHILS # BLD AUTO: 7.4 X10E3/UL (ref 1.4–7)
NEUTROPHILS NFR BLD AUTO: 74 %
PETH BLD-MCNC: 156 NG/ML
PLATELET # BLD AUTO: 107 X10E3/UL (ref 150–450)
POTASSIUM SERPL-SCNC: 3.7 MMOL/L (ref 3.5–5.2)
PROT SERPL-MCNC: 7.2 G/DL (ref 6–8.5)
RBC # BLD AUTO: 3.66 X10E6/UL (ref 4.14–5.8)
SIROLIMUS BLD-MCNC: 10.7 NG/ML (ref 3–20)
SODIUM SERPL-SCNC: 137 MMOL/L (ref 134–144)
WBC # BLD AUTO: 10.2 X10E3/UL (ref 3.4–10.8)

## 2019-06-28 ENCOUNTER — TELEPHONE (OUTPATIENT)
Dept: TRANSPLANT | Facility: CLINIC | Age: 29
End: 2019-06-28

## 2019-06-28 NOTE — TELEPHONE ENCOUNTER
----- Message from Georgina Rogers MD sent at 6/27/2019  2:26 PM CDT -----  Please inform patient that his alcohol test is positive and he should stop alcohol completely, return to AA and outpatient rehab if he can. Please inform that his alcohol use is damaging his liver, AST 51 not normal. Also, if you can, please call his mother, who lives in [place], she should know. Sirolimus level is ok, no change. Please repeat labs in 2 weeks with TOM.

## 2019-07-01 ENCOUNTER — TELEPHONE (OUTPATIENT)
Dept: TRANSPLANT | Facility: CLINIC | Age: 29
End: 2019-07-01

## 2019-07-01 NOTE — TELEPHONE ENCOUNTER
Randee Mr Diana,        I have left a VM on your phone and have not heard from you. Dr Rogers reviewed your labs. Your liver enzyme AST is elevated, also the PEth test which checks for alcohol was positive. This is very concerning. The Peth test looks for alcohol use over a few month period and it was 156, pretty elevated. The liver enzymes show us the health of your liver. We need to discuss this as soon as possible. Dr Rogers would like you to repeat labs in 2 weeks, 7/8/19, and abstain from any alcohol consumption which can damage your liver. I will fax to the [facility] that you used with last lab draw. If you cannot make this date of 7/8/19, please call me and we can discuss.    Thank You,  Lubna Agustin, RN, BSN, CCTC    Called pt's sister and asked if she could contact Jhonny to have him call Coordinator to discuss lab work. She stated she would.

## 2019-07-01 NOTE — TELEPHONE ENCOUNTER
----- Message from Georgina Rogers MD sent at 6/25/2019 10:35 AM CDT -----  Liver transplant blood tests reviewed. Labs stable, no change in immunosuppression. Please continue to monitor liver tests per transplant protocol.

## 2019-07-01 NOTE — TELEPHONE ENCOUNTER
----- Message from Georgina Rogers MD sent at 6/25/2019  9:48 AM CDT -----  Labs stable, sirolimus level is pending, please let me know when sirolimus is back.

## 2019-07-02 ENCOUNTER — TELEPHONE (OUTPATIENT)
Dept: TRANSPLANT | Facility: CLINIC | Age: 29
End: 2019-07-02

## 2019-07-02 ENCOUNTER — PATIENT MESSAGE (OUTPATIENT)
Dept: TRANSPLANT | Facility: CLINIC | Age: 29
End: 2019-07-02

## 2019-07-02 NOTE — TELEPHONE ENCOUNTER
Noted. Pt replied via [facility]. See note. Pt will recheck labs in 2 weeks. Orders emailed to pt and faxed to his lab.

## 2019-07-02 NOTE — TELEPHONE ENCOUNTER
Randee Diana,        I have your updated number listed as primary contact. The Pet test checks over a period of a few months. Dr Rogers and I wanted to make sure that you are okay. Lets plan on labs for 7/11. Do you prefer message on [facility] versus your phone?

## 2019-07-15 LAB
EXT ALBUMIN: 4.1
EXT ALKALINE PHOSPHATASE: 135
EXT ALT: 35
EXT AST: 42
EXT BASOPHIL%: 2
EXT BILIRUBIN DIRECT: 0.15 MG/DL
EXT BILIRUBIN TOTAL: 0.5
EXT BUN: 16
EXT CALCIUM: 9.1
EXT CHLORIDE: 106
EXT CO2: 20
EXT CREATININE: 1.47 MG/DL
EXT EOSINOPHIL%: 5
EXT GLUCOSE: 112
EXT HEMATOCRIT: 30.4
EXT HEMOGLOBIN: 10.5
EXT LYMPH%: 35
EXT MONOCYTES%: 7
EXT PLATELETS: 69
EXT POTASSIUM: 4
EXT PROTEIN TOTAL: 7.4
EXT SEGS%: 51
EXT SIROLIMUS LVL: 2.2
EXT SODIUM: 141 MMOL/L
EXT WBC: 4.3

## 2019-07-17 LAB
ALBUMIN SERPL-MCNC: 4.1 G/DL (ref 3.5–5.5)
ALBUMIN/GLOB SERPL: 1.2 {RATIO} (ref 1.2–2.2)
ALP SERPL-CCNC: 135 IU/L (ref 39–117)
ALT SERPL-CCNC: 35 IU/L (ref 0–44)
AST SERPL-CCNC: 42 IU/L (ref 0–40)
BASOPHILS # BLD AUTO: 0.1 X10E3/UL (ref 0–0.2)
BASOPHILS NFR BLD AUTO: 2 %
BILIRUB DIRECT SERPL-MCNC: 0.15 MG/DL (ref 0–0.4)
BILIRUB SERPL-MCNC: 0.5 MG/DL (ref 0–1.2)
BUN SERPL-MCNC: 16 MG/DL (ref 6–20)
BUN/CREAT SERPL: 11 (ref 9–20)
CALCIUM SERPL-MCNC: 9.1 MG/DL (ref 8.7–10.2)
CHLORIDE SERPL-SCNC: 106 MMOL/L (ref 96–106)
CO2 SERPL-SCNC: 20 MMOL/L (ref 20–29)
CREAT SERPL-MCNC: 1.47 MG/DL (ref 0.76–1.27)
EOSINOPHIL # BLD AUTO: 0.2 X10E3/UL (ref 0–0.4)
EOSINOPHIL NFR BLD AUTO: 5 %
ERYTHROCYTE [DISTWIDTH] IN BLOOD BY AUTOMATED COUNT: 13.7 % (ref 12.3–15.4)
GLOBULIN SER CALC-MCNC: 3.3 G/DL (ref 1.5–4.5)
GLUCOSE SERPL-MCNC: 112 MG/DL (ref 65–99)
HCT VFR BLD AUTO: 30.4 % (ref 37.5–51)
HGB BLD-MCNC: 10.5 G/DL (ref 13–17.7)
LYMPHOCYTES # BLD AUTO: 1.5 X10E3/UL (ref 0.7–3.1)
LYMPHOCYTES NFR BLD AUTO: 35 %
MCH RBC QN AUTO: 31.4 PG (ref 26.6–33)
MCHC RBC AUTO-ENTMCNC: 34.5 G/DL (ref 31.5–35.7)
MCV RBC AUTO: 91 FL (ref 79–97)
MONOCYTES # BLD AUTO: 0.3 X10E3/UL (ref 0.1–0.9)
MONOCYTES NFR BLD AUTO: 7 %
MORPHOLOGY BLD-IMP: ABNORMAL
NEUTROPHILS # BLD AUTO: 2.2 X10E3/UL (ref 1.4–7)
NEUTROPHILS NFR BLD AUTO: 51 %
PETH BLD-MCNC: NEGATIVE NG/ML
PLATELET # BLD AUTO: 69 X10E3/UL (ref 150–450)
POTASSIUM SERPL-SCNC: 4 MMOL/L (ref 3.5–5.2)
PROT SERPL-MCNC: 7.4 G/DL (ref 6–8.5)
RBC # BLD AUTO: 3.34 X10E6/UL (ref 4.14–5.8)
SIROLIMUS BLD-MCNC: 2.2 NG/ML (ref 3–20)
SODIUM SERPL-SCNC: 141 MMOL/L (ref 134–144)
WBC # BLD AUTO: 4.3 X10E3/UL (ref 3.4–10.8)

## 2019-07-18 ENCOUNTER — TELEPHONE (OUTPATIENT)
Dept: TRANSPLANT | Facility: CLINIC | Age: 29
End: 2019-07-18

## 2019-07-18 LAB — PHOSPHATIDYLETHANOL (PETH): NEGATIVE

## 2019-07-18 NOTE — TELEPHONE ENCOUNTER
----- Message from Georgina Rogers MD sent at 7/18/2019 11:56 AM CDT -----  Sirolimus 2.2, we did not change it last time, did he miss any dose? I would not change it this time, please repeat labs on Monday.

## 2019-07-18 NOTE — TELEPHONE ENCOUNTER
Randee Diana,       Dr Rogers reviewed your labs. Creatinine improved. Sirolimus a little low compared to previous levels. Dr Rogers would like you to recheck your labs next week. I have faxed labs good for the week that way you can go any day your job allows. Also,  Happy early birthday to you.    Thanks,  SISSY Calvo

## 2019-07-18 NOTE — TELEPHONE ENCOUNTER
----- Message from Georgina Rogers MD sent at 7/16/2019  9:06 AM CDT -----  Labs stable, Cr improving, sirolimus pending.

## 2019-07-31 ENCOUNTER — PATIENT MESSAGE (OUTPATIENT)
Dept: TRANSPLANT | Facility: CLINIC | Age: 29
End: 2019-07-31

## 2019-07-31 ENCOUNTER — TELEPHONE (OUTPATIENT)
Dept: TRANSPLANT | Facility: CLINIC | Age: 29
End: 2019-07-31

## 2019-07-31 NOTE — TELEPHONE ENCOUNTER
Ramo Calvo,     I've not been able to, long story, but between moving and work having a huge issue with scheduling, I've literally not had time during normal business hours to go.  I intend to go as soon as possible, work had a major glitch and I essentially am scheduled to work each day until further notice when they get this fixed, but it'll be done ASAP.       Thanks!!          You  Jhonny Diana 5 hours ago (11:08 AM)         Randee Mr Diana,     Just checking to see if you completed labs the week of 7/22/19 as Dr Rogers instructed? I have not received.     Thanks,   SISSY Calvo

## 2019-10-07 NOTE — ASSESSMENT & PLAN NOTE
[facility] Dosing Services  Automatic adjustment of enoxaparin  Dr. Nanette Vance  Indication: VTE prophylaxis    Wt Readings from Last 1 Encounters:   10/06/19 61.5 kg (135 lb 8 oz)       Ht Readings from Last 1 Encounters:   10/06/19 167.6 cm (66\")         Pharmacist made change to enoxaparin therapy based on improved renal function. Order changed back to original order of enoxaparin 40 mg SUBCUT every 24 hours. Previous Dose Heparin 5000 units SUBCUT every 12 hours. Creatinine Clearance Estimated Creatinine Clearance: 57.7 mL/min (based on SCr of 0.97 mg/dL). Creatinine Lab Results   Component Value Date/Time    Creatinine 0.97 10/07/2019 04:10 AM         Platelet Lab Results   Component Value Date/Time    PLATELET 335 42/53/8298 04:10 AM        H/H Lab Results   Component Value Date/Time    HGB 10.1 (L) 10/07/2019 04:10 AM          Epidural Catheter? No  Other anticoagulants: No  Relevant drug interactions: None     Pharmacy to automatically make dose adjustment for renal dysfunction (creatinine clearance less than 30 mL/min)  Pharmacy to automatically make dose adjustment for obesity (BMI greater than 40) or low weight (heparin for wt < 60 kg)  Pharmacy to make dose rounding adjustments per [facility] dose adjustment scale. Pharmacy will monitor patients progress, make dose adjustment as needed per changing renal function, and communicate further recommendations regarding patients anticoagulation therapy with prescriber.     Thank you,  Tenisha Ndiaye, PharmD, BCPS  [phone] · Appreciate Hepatology assistance  · Continue Lactulose and Rifaximin for HE  · Currently not transplant candidate per Psych and Hep.  Addiction Psych consulted  · IR para 10/9- 7.8 L fluid removed negative for SBP  · Stressed the importance of high calorie diet to prevent NGT insertion again - Boost TID  · Patient agreed to due alcohol rehab if patient was to get a liver transplant; we have initiated inpatient liver transplant evaluation;   · Patient has been accepted for liver transplant and has been approved for listing; transfer to LTS tomorrow     MELD-Na score: 36 at 10/16/2017  5:28 AM  MELD score: 33 at 10/16/2017  5:28 AM  Calculated from:  Serum Creatinine: 2.4 mg/dL at 10/16/2017  5:28 AM  Serum Sodium: 122 mmol/L (Rounded to 125) at 10/16/2017  5:28 AM  Total Bilirubin: 31.3 mg/dL at 10/16/2017  5:28 AM  INR(ratio): 1.6 at 10/16/2017  5:28 AM  Age: 27 years

## 2019-11-08 ENCOUNTER — PATIENT MESSAGE (OUTPATIENT)
Dept: TRANSPLANT | Facility: CLINIC | Age: 29
End: 2019-11-08

## 2019-11-14 NOTE — ASSESSMENT & PLAN NOTE
-Patient with stuttering speech, trouble recalling specific words when speaking. Does have hx of seizure (on keppra) previously while on prograf. Switched to prograf over the weekend from cyclosporine. Neuro exam unremarkable.   -CT head without contrast obtained, unremarkable.   -In light of these symptoms, prograf d/c, restart cyclosporine 3/1.  - remains w/o seizure.  Cont to have stutter.       Diagnosis: Multiple Myeloma    Regimen: Revlimid Q 21D, Velcade weekly, Dex  Cycle/Day: C3D8    Dr. Newell is supervising clinician today.    ECO - Fully active, able to carry on all predisease activities without restrictions.    Nursing Assessment:   A focused nursing assessment addressing the toxicity of chemotherapy was performed and the patient reports the following:  Nausea: NO  Vomiting: NO  Fever: NO  Chills: NO  Other signs of infection: NO  Bleeding: NO  Mucositis: NO  Diarrhea: NO  Constipation: NO  Anorexia: NO  Dysuria: NO  Urinary Bleeding: NO  Cough: NO  Shortness of Breath: NO  Fatigue/Weakness: YES, grade 1  Numbness/Tingling: YES, hands and feet  Other Neuropathies: NO  Edema: NO  Rash: NO  Hand/Foot Syndrome: NO  Anxiety/Depression/Insomnia: YES, insomnia grade 1  Pain: YES, Pain Ratin, Location: bone pain in back and shoulder blades, Intervention: none at this time, long standing chronic pain.    Patient confirms that he has received a copy of Chemotherapy Consent and verbalizes understanding of treatment plan: Yes.     Pre-Treatment: - Treatment consent signed  - Patient has valid pre-authorization  - VS completed  - BSA double checked  - Premed orders, including hydration, are verified prior to administration  - Treatment parameters verified in patient protocol  - Chemotherapy doses independently doubled checked & verified by two practitioners  - Chemotherapy infusion pump settings are double checked & verified by two practitioners  - Patient is identified by first & last name, Date of birth that has been verified by two practitioners with the patient chairside.    Treatment: Refer to LDA and MAR for line assessment and medication administration  Refer to Toxicity Assessment doc flowsheet   Chemotherapy has not   Apperance and physical integrity of drugs meets standard of drug monograph    Post Treatment: Treatment tolerated well; no adverse reaction    Does the Patient have a  central line? No    Transfusion: Not needed    Integrative Medicine: No    Oral Chemotherapy: Yes, Patient is taking medication as prescribed.    Education: Instructed on medication administration procedure.    Next appointment scheduled:   Future Appointments   Date Time Provider Department Center   11/21/2019  1:30 PM [place] [id number] LAB [address]   11/21/2019  2:00 PM Yelena Clarke PA-C [address]   .    Patient instructed to call the office with any questions or concerns.    Patient Discharged: patient discharged to home per self, ambulatory

## 2019-11-21 ENCOUNTER — PATIENT MESSAGE (OUTPATIENT)
Dept: TRANSPLANT | Facility: CLINIC | Age: 29
End: 2019-11-21

## 2019-11-22 ENCOUNTER — HOSPITAL ENCOUNTER (INPATIENT)
Facility: HOSPITAL | Age: 29
LOS: 7 days | Discharge: HOME | DRG: 371 | End: 2019-11-30
Attending: EMERGENCY MEDICINE | Admitting: EMERGENCY MEDICINE
Payer: COMMERCIAL

## 2019-11-22 DIAGNOSIS — Z79.60 LONG-TERM USE OF IMMUNOSUPPRESSANT MEDICATION: Chronic | ICD-10-CM

## 2019-11-22 DIAGNOSIS — A07.1 GIARDIA: ICD-10-CM

## 2019-11-22 DIAGNOSIS — Z94.4 LIVER TRANSPLANTED: ICD-10-CM

## 2019-11-22 DIAGNOSIS — K52.9 ENTERITIS: ICD-10-CM

## 2019-11-22 DIAGNOSIS — N17.9 AKI (ACUTE KIDNEY INJURY): Primary | ICD-10-CM

## 2019-11-22 DIAGNOSIS — A09: ICD-10-CM

## 2019-11-22 DIAGNOSIS — Z94.4 S/P LIVER TRANSPLANT: Chronic | ICD-10-CM

## 2019-11-22 DIAGNOSIS — R18.8 OTHER ASCITES: ICD-10-CM

## 2019-11-22 DIAGNOSIS — R53.1 WEAKNESS: ICD-10-CM

## 2019-11-22 LAB
ALBUMIN SERPL BCP-MCNC: 2.4 G/DL (ref 3.5–5.2)
ALP SERPL-CCNC: 176 U/L (ref 55–135)
ALT SERPL W/O P-5'-P-CCNC: 12 U/L (ref 10–44)
ANION GAP SERPL CALC-SCNC: 9 MMOL/L (ref 8–16)
AST SERPL-CCNC: 19 U/L (ref 10–40)
BASOPHILS # BLD AUTO: 0.03 K/UL (ref 0–0.2)
BASOPHILS NFR BLD: 0.5 % (ref 0–1.9)
BILIRUB SERPL-MCNC: 0.7 MG/DL (ref 0.1–1)
BUN SERPL-MCNC: 12 MG/DL (ref 6–20)
CALCIUM SERPL-MCNC: 8.7 MG/DL (ref 8.7–10.5)
CHLORIDE SERPL-SCNC: 109 MMOL/L (ref 95–110)
CO2 SERPL-SCNC: 20 MMOL/L (ref 23–29)
CREAT SERPL-MCNC: 2.3 MG/DL (ref 0.5–1.4)
CTP QC/QA: YES
DIFFERENTIAL METHOD: ABNORMAL
EOSINOPHIL # BLD AUTO: 0.4 K/UL (ref 0–0.5)
EOSINOPHIL NFR BLD: 6.9 % (ref 0–8)
ERYTHROCYTE [DISTWIDTH] IN BLOOD BY AUTOMATED COUNT: 14.1 % (ref 11.5–14.5)
EST. GFR  (AFRICAN AMERICAN): 42.5 ML/MIN/1.73 M^2
EST. GFR  (NON AFRICAN AMERICAN): 36.7 ML/MIN/1.73 M^2
GLUCOSE SERPL-MCNC: 91 MG/DL (ref 70–110)
HCT VFR BLD AUTO: 29.2 % (ref 40–54)
HGB BLD-MCNC: 9.3 G/DL (ref 14–18)
IMM GRANULOCYTES # BLD AUTO: 0.04 K/UL (ref 0–0.04)
IMM GRANULOCYTES NFR BLD AUTO: 0.7 % (ref 0–0.5)
INR PPP: 1 (ref 0.8–1.2)
LACTATE SERPL-SCNC: 1.2 MMOL/L (ref 0.5–2.2)
LYMPHOCYTES # BLD AUTO: 1.4 K/UL (ref 1–4.8)
LYMPHOCYTES NFR BLD: 23.8 % (ref 18–48)
MCH RBC QN AUTO: 27 PG (ref 27–31)
MCHC RBC AUTO-ENTMCNC: 31.8 G/DL (ref 32–36)
MCV RBC AUTO: 85 FL (ref 82–98)
MONOCYTES # BLD AUTO: 0.4 K/UL (ref 0.3–1)
MONOCYTES NFR BLD: 6.9 % (ref 4–15)
NEUTROPHILS # BLD AUTO: 3.6 K/UL (ref 1.8–7.7)
NEUTROPHILS NFR BLD: 61.2 % (ref 38–73)
NRBC BLD-RTO: 0 /100 WBC
PLATELET # BLD AUTO: 147 K/UL (ref 150–350)
PMV BLD AUTO: 9.4 FL (ref 9.2–12.9)
POC MOLECULAR INFLUENZA A AGN: NEGATIVE
POC MOLECULAR INFLUENZA B AGN: NEGATIVE
POTASSIUM SERPL-SCNC: 3.8 MMOL/L (ref 3.5–5.1)
PROT SERPL-MCNC: 7.9 G/DL (ref 6–8.4)
PROTHROMBIN TIME: 10.7 SEC (ref 9–12.5)
RBC # BLD AUTO: 3.45 M/UL (ref 4.6–6.2)
SODIUM SERPL-SCNC: 138 MMOL/L (ref 136–145)
WBC # BLD AUTO: 5.83 K/UL (ref 3.9–12.7)

## 2019-11-22 PROCEDURE — 99285 EMERGENCY DEPT VISIT HI MDM: CPT | Mod: 25

## 2019-11-22 PROCEDURE — 93010 ELECTROCARDIOGRAM REPORT: CPT | Mod: ,,, | Performed by: INTERNAL MEDICINE

## 2019-11-22 PROCEDURE — 85025 COMPLETE CBC W/AUTO DIFF WBC: CPT

## 2019-11-22 PROCEDURE — 87502 INFLUENZA DNA AMP PROBE: CPT

## 2019-11-22 PROCEDURE — 99285 PR EMERGENCY DEPT VISIT,LEVEL V: ICD-10-PCS | Mod: ,,, | Performed by: EMERGENCY MEDICINE

## 2019-11-22 PROCEDURE — 93005 ELECTROCARDIOGRAM TRACING: CPT

## 2019-11-22 PROCEDURE — 83605 ASSAY OF LACTIC ACID: CPT

## 2019-11-22 PROCEDURE — 87040 BLOOD CULTURE FOR BACTERIA: CPT | Mod: 59

## 2019-11-22 PROCEDURE — 81001 URINALYSIS AUTO W/SCOPE: CPT

## 2019-11-22 PROCEDURE — 85610 PROTHROMBIN TIME: CPT

## 2019-11-22 PROCEDURE — 99285 EMERGENCY DEPT VISIT HI MDM: CPT | Mod: ,,, | Performed by: EMERGENCY MEDICINE

## 2019-11-22 PROCEDURE — 80053 COMPREHEN METABOLIC PANEL: CPT

## 2019-11-22 PROCEDURE — 93010 EKG 12-LEAD: ICD-10-PCS | Mod: ,,, | Performed by: INTERNAL MEDICINE

## 2019-11-22 RX ORDER — OXYCODONE AND ACETAMINOPHEN 10; 325 MG/1; MG/1
1 TABLET ORAL EVERY 4 HOURS PRN
Status: ON HOLD | COMMUNITY
End: 2019-11-24

## 2019-11-23 PROBLEM — R53.1 WEAKNESS: Status: ACTIVE | Noted: 2019-11-23

## 2019-11-23 PROBLEM — N18.9 ACUTE KIDNEY INJURY SUPERIMPOSED ON CKD: Status: ACTIVE | Noted: 2018-08-14

## 2019-11-23 PROBLEM — F11.11 HISTORY OF OPIOID ABUSE: Status: ACTIVE | Noted: 2019-11-23

## 2019-11-23 PROBLEM — A09 ENTERITIS OF INFECTIOUS ORIGIN: Status: ACTIVE | Noted: 2019-11-23

## 2019-11-23 PROBLEM — N17.9 AKI (ACUTE KIDNEY INJURY): Status: ACTIVE | Noted: 2019-11-23

## 2019-11-23 PROBLEM — N17.9 ACUTE KIDNEY INJURY SUPERIMPOSED ON CKD: Status: ACTIVE | Noted: 2018-08-14

## 2019-11-23 PROBLEM — R10.11 RIGHT UPPER QUADRANT ABDOMINAL PAIN: Status: ACTIVE | Noted: 2019-11-23

## 2019-11-23 PROBLEM — D64.9 ANEMIA: Status: ACTIVE | Noted: 2019-11-23

## 2019-11-23 LAB
ABO + RH BLD: NORMAL
ALBUMIN SERPL BCP-MCNC: 2.6 G/DL (ref 3.5–5.2)
ALP SERPL-CCNC: 201 U/L (ref 55–135)
ALT SERPL W/O P-5'-P-CCNC: 11 U/L (ref 10–44)
ANION GAP SERPL CALC-SCNC: 13 MMOL/L (ref 8–16)
AST SERPL-CCNC: 26 U/L (ref 10–40)
BACTERIA #/AREA URNS AUTO: NORMAL /HPF
BASOPHILS # BLD AUTO: 0.03 K/UL (ref 0–0.2)
BASOPHILS NFR BLD: 0.5 % (ref 0–1.9)
BILIRUB SERPL-MCNC: 0.7 MG/DL (ref 0.1–1)
BILIRUB UR QL STRIP: NEGATIVE
BLD GP AB SCN CELLS X3 SERPL QL: NORMAL
BUN SERPL-MCNC: 11 MG/DL (ref 6–20)
CALCIUM SERPL-MCNC: 9.2 MG/DL (ref 8.7–10.5)
CHLORIDE SERPL-SCNC: 106 MMOL/L (ref 95–110)
CLARITY UR REFRACT.AUTO: CLEAR
CO2 SERPL-SCNC: 17 MMOL/L (ref 23–29)
COLOR UR AUTO: ABNORMAL
CREAT SERPL-MCNC: 2.1 MG/DL (ref 0.5–1.4)
CRP SERPL-MCNC: 126.5 MG/L (ref 0–8.2)
DAT IGG-SP REAG RBC-IMP: NORMAL
DIFFERENTIAL METHOD: ABNORMAL
EOSINOPHIL # BLD AUTO: 0.3 K/UL (ref 0–0.5)
EOSINOPHIL NFR BLD: 6 % (ref 0–8)
ERYTHROCYTE [DISTWIDTH] IN BLOOD BY AUTOMATED COUNT: 14.2 % (ref 11.5–14.5)
ERYTHROCYTE [SEDIMENTATION RATE] IN BLOOD BY WESTERGREN METHOD: 80 MM/HR (ref 0–23)
EST. GFR  (AFRICAN AMERICAN): 47.4 ML/MIN/1.73 M^2
EST. GFR  (NON AFRICAN AMERICAN): 41 ML/MIN/1.73 M^2
GLUCOSE SERPL-MCNC: 83 MG/DL (ref 70–110)
GLUCOSE UR QL STRIP: NEGATIVE
HAPTOGLOB SERPL-MCNC: 355 MG/DL (ref 30–250)
HCT VFR BLD AUTO: 28.9 % (ref 40–54)
HGB BLD-MCNC: 9.4 G/DL (ref 14–18)
HGB UR QL STRIP: ABNORMAL
IMM GRANULOCYTES # BLD AUTO: 0.04 K/UL (ref 0–0.04)
IMM GRANULOCYTES NFR BLD AUTO: 0.7 % (ref 0–0.5)
INR PPP: 0.9 (ref 0.8–1.2)
KETONES UR QL STRIP: NEGATIVE
LDH SERPL L TO P-CCNC: 245 U/L (ref 110–260)
LEUKOCYTE ESTERASE UR QL STRIP: NEGATIVE
LIPASE SERPL-CCNC: 15 U/L (ref 4–60)
LYMPHOCYTES # BLD AUTO: 1.3 K/UL (ref 1–4.8)
LYMPHOCYTES NFR BLD: 22.9 % (ref 18–48)
MAGNESIUM SERPL-MCNC: 1.8 MG/DL (ref 1.6–2.6)
MCH RBC QN AUTO: 27.7 PG (ref 27–31)
MCHC RBC AUTO-ENTMCNC: 32.5 G/DL (ref 32–36)
MCV RBC AUTO: 85 FL (ref 82–98)
MICROSCOPIC COMMENT: NORMAL
MONOCYTES # BLD AUTO: 0.4 K/UL (ref 0.3–1)
MONOCYTES NFR BLD: 7.4 % (ref 4–15)
NEUTROPHILS # BLD AUTO: 3.5 K/UL (ref 1.8–7.7)
NEUTROPHILS NFR BLD: 62.5 % (ref 38–73)
NITRITE UR QL STRIP: NEGATIVE
NRBC BLD-RTO: 0 /100 WBC
PH UR STRIP: 5 [PH] (ref 5–8)
PHOSPHATE SERPL-MCNC: 3 MG/DL (ref 2.7–4.5)
PLATELET # BLD AUTO: 183 K/UL (ref 150–350)
PMV BLD AUTO: 10.4 FL (ref 9.2–12.9)
POTASSIUM SERPL-SCNC: 4.2 MMOL/L (ref 3.5–5.1)
PROCALCITONIN SERPL IA-MCNC: 0.15 NG/ML
PROT SERPL-MCNC: 8.8 G/DL (ref 6–8.4)
PROT UR QL STRIP: NEGATIVE
PROTHROMBIN TIME: 9.9 SEC (ref 9–12.5)
RBC # BLD AUTO: 3.39 M/UL (ref 4.6–6.2)
RBC #/AREA URNS AUTO: 1 /HPF (ref 0–4)
SIROLIMUS BLD-MCNC: 19.5 NG/ML (ref 4–20)
SODIUM SERPL-SCNC: 136 MMOL/L (ref 136–145)
SP GR UR STRIP: 1 (ref 1–1.03)
URN SPEC COLLECT METH UR: ABNORMAL
WBC # BLD AUTO: 5.67 K/UL (ref 3.9–12.7)
WBC #/AREA URNS AUTO: 0 /HPF (ref 0–5)

## 2019-11-23 PROCEDURE — 83735 ASSAY OF MAGNESIUM: CPT

## 2019-11-23 PROCEDURE — 96374 THER/PROPH/DIAG INJ IV PUSH: CPT | Mod: 59 | Performed by: EMERGENCY MEDICINE

## 2019-11-23 PROCEDURE — 63600175 PHARM REV CODE 636 W HCPCS: Performed by: STUDENT IN AN ORGANIZED HEALTH CARE EDUCATION/TRAINING PROGRAM

## 2019-11-23 PROCEDURE — 25000003 PHARM REV CODE 250: Performed by: STUDENT IN AN ORGANIZED HEALTH CARE EDUCATION/TRAINING PROGRAM

## 2019-11-23 PROCEDURE — 99220 PR INITIAL OBSERVATION CARE,LEVL III: ICD-10-PCS | Mod: ,,, | Performed by: HOSPITALIST

## 2019-11-23 PROCEDURE — 87040 BLOOD CULTURE FOR BACTERIA: CPT

## 2019-11-23 PROCEDURE — 80053 COMPREHEN METABOLIC PANEL: CPT

## 2019-11-23 PROCEDURE — 85025 COMPLETE CBC W/AUTO DIFF WBC: CPT

## 2019-11-23 PROCEDURE — 63600175 PHARM REV CODE 636 W HCPCS: Performed by: INTERNAL MEDICINE

## 2019-11-23 PROCEDURE — 84100 ASSAY OF PHOSPHORUS: CPT

## 2019-11-23 PROCEDURE — 25500020 PHARM REV CODE 255: Performed by: INTERNAL MEDICINE

## 2019-11-23 PROCEDURE — 99220 PR INITIAL OBSERVATION CARE,LEVL III: CPT | Mod: ,,, | Performed by: HOSPITALIST

## 2019-11-23 PROCEDURE — 85652 RBC SED RATE AUTOMATED: CPT

## 2019-11-23 PROCEDURE — 25000003 PHARM REV CODE 250: Performed by: EMERGENCY MEDICINE

## 2019-11-23 PROCEDURE — 86920 COMPATIBILITY TEST SPIN: CPT

## 2019-11-23 PROCEDURE — 99255 IP/OBS CONSLTJ NEW/EST HI 80: CPT | Mod: ,,, | Performed by: INTERNAL MEDICINE

## 2019-11-23 PROCEDURE — 86901 BLOOD TYPING SEROLOGIC RH(D): CPT

## 2019-11-23 PROCEDURE — 83615 LACTATE (LD) (LDH) ENZYME: CPT

## 2019-11-23 PROCEDURE — 84145 PROCALCITONIN (PCT): CPT

## 2019-11-23 PROCEDURE — 25000003 PHARM REV CODE 250: Performed by: INTERNAL MEDICINE

## 2019-11-23 PROCEDURE — 86140 C-REACTIVE PROTEIN: CPT

## 2019-11-23 PROCEDURE — 86880 COOMBS TEST DIRECT: CPT

## 2019-11-23 PROCEDURE — 96376 TX/PRO/DX INJ SAME DRUG ADON: CPT | Performed by: EMERGENCY MEDICINE

## 2019-11-23 PROCEDURE — 83010 ASSAY OF HAPTOGLOBIN QUANT: CPT

## 2019-11-23 PROCEDURE — 85610 PROTHROMBIN TIME: CPT

## 2019-11-23 PROCEDURE — 96375 TX/PRO/DX INJ NEW DRUG ADDON: CPT | Performed by: EMERGENCY MEDICINE

## 2019-11-23 PROCEDURE — 99255 PR INITIAL INPATIENT CONSULT,LEVL V: ICD-10-PCS | Mod: ,,, | Performed by: INTERNAL MEDICINE

## 2019-11-23 PROCEDURE — 63600175 PHARM REV CODE 636 W HCPCS: Performed by: EMERGENCY MEDICINE

## 2019-11-23 PROCEDURE — 83690 ASSAY OF LIPASE: CPT

## 2019-11-23 PROCEDURE — 80195 ASSAY OF SIROLIMUS: CPT

## 2019-11-23 PROCEDURE — 11000001 HC ACUTE MED/SURG PRIVATE ROOM

## 2019-11-23 PROCEDURE — 99254 IP/OBS CNSLTJ NEW/EST MOD 60: CPT | Mod: ,,, | Performed by: INTERNAL MEDICINE

## 2019-11-23 PROCEDURE — 99254 PR INITIAL INPATIENT CONSULT,LEVL IV: ICD-10-PCS | Mod: ,,, | Performed by: INTERNAL MEDICINE

## 2019-11-23 RX ORDER — GLUCAGON 1 MG
1 KIT INJECTION
Status: DISCONTINUED | OUTPATIENT
Start: 2019-11-23 | End: 2019-11-30 | Stop reason: HOSPADM

## 2019-11-23 RX ORDER — SODIUM CHLORIDE 0.9 % (FLUSH) 0.9 %
10 SYRINGE (ML) INJECTION
Status: DISCONTINUED | OUTPATIENT
Start: 2019-11-23 | End: 2019-11-30 | Stop reason: HOSPADM

## 2019-11-23 RX ORDER — ENOXAPARIN SODIUM 100 MG/ML
40 INJECTION SUBCUTANEOUS EVERY 24 HOURS
Status: DISCONTINUED | OUTPATIENT
Start: 2019-11-23 | End: 2019-11-30 | Stop reason: HOSPADM

## 2019-11-23 RX ORDER — DEXTROSE MONOHYDRATE 100 MG/ML
12.5 INJECTION, SOLUTION INTRAVENOUS
Status: DISCONTINUED | OUTPATIENT
Start: 2019-11-23 | End: 2019-11-30 | Stop reason: HOSPADM

## 2019-11-23 RX ORDER — IBUPROFEN 200 MG
16 TABLET ORAL
Status: DISCONTINUED | OUTPATIENT
Start: 2019-11-23 | End: 2019-11-30 | Stop reason: HOSPADM

## 2019-11-23 RX ORDER — HYDROCODONE BITARTRATE AND ACETAMINOPHEN 10; 325 MG/1; MG/1
1 TABLET ORAL EVERY 6 HOURS PRN
Status: DISCONTINUED | OUTPATIENT
Start: 2019-11-23 | End: 2019-11-23

## 2019-11-23 RX ORDER — OXYCODONE HYDROCHLORIDE 5 MG/1
5 TABLET ORAL
Status: COMPLETED | OUTPATIENT
Start: 2019-11-23 | End: 2019-11-23

## 2019-11-23 RX ORDER — CEFTRIAXONE 1 G/1
1 INJECTION, POWDER, FOR SOLUTION INTRAMUSCULAR; INTRAVENOUS
Status: COMPLETED | OUTPATIENT
Start: 2019-11-23 | End: 2019-11-23

## 2019-11-23 RX ORDER — IBUPROFEN 200 MG
24 TABLET ORAL
Status: DISCONTINUED | OUTPATIENT
Start: 2019-11-23 | End: 2019-11-30 | Stop reason: HOSPADM

## 2019-11-23 RX ORDER — POLYETHYLENE GLYCOL 3350 17 G/17G
17 POWDER, FOR SOLUTION ORAL 3 TIMES DAILY
Status: COMPLETED | OUTPATIENT
Start: 2019-11-23 | End: 2019-11-24

## 2019-11-23 RX ORDER — HYDROCODONE BITARTRATE AND ACETAMINOPHEN 10; 325 MG/1; MG/1
1 TABLET ORAL EVERY 4 HOURS PRN
Status: DISCONTINUED | OUTPATIENT
Start: 2019-11-23 | End: 2019-11-30 | Stop reason: HOSPADM

## 2019-11-23 RX ORDER — PANTOPRAZOLE SODIUM 40 MG/1
40 TABLET, DELAYED RELEASE ORAL DAILY
Status: DISCONTINUED | OUTPATIENT
Start: 2019-11-23 | End: 2019-11-30 | Stop reason: HOSPADM

## 2019-11-23 RX ORDER — DEXTROSE MONOHYDRATE 100 MG/ML
25 INJECTION, SOLUTION INTRAVENOUS
Status: DISCONTINUED | OUTPATIENT
Start: 2019-11-23 | End: 2019-11-30 | Stop reason: HOSPADM

## 2019-11-23 RX ORDER — HYDROMORPHONE HYDROCHLORIDE 1 MG/ML
0.5 INJECTION, SOLUTION INTRAMUSCULAR; INTRAVENOUS; SUBCUTANEOUS EVERY 6 HOURS PRN
Status: DISCONTINUED | OUTPATIENT
Start: 2019-11-23 | End: 2019-11-30 | Stop reason: HOSPADM

## 2019-11-23 RX ADMIN — CEFTRIAXONE SODIUM 1 G: 1 INJECTION, POWDER, FOR SOLUTION INTRAMUSCULAR; INTRAVENOUS at 01:11

## 2019-11-23 RX ADMIN — HYDROMORPHONE HYDROCHLORIDE 0.5 MG: 1 INJECTION, SOLUTION INTRAMUSCULAR; INTRAVENOUS; SUBCUTANEOUS at 06:11

## 2019-11-23 RX ADMIN — OXYCODONE HYDROCHLORIDE 5 MG: 5 TABLET ORAL at 01:11

## 2019-11-23 RX ADMIN — IOHEXOL 15 ML: 350 INJECTION, SOLUTION INTRAVENOUS at 05:11

## 2019-11-23 RX ADMIN — HYDROCODONE BITARTRATE AND ACETAMINOPHEN 1 TABLET: 10; 325 TABLET ORAL at 09:11

## 2019-11-23 RX ADMIN — HYDROCODONE BITARTRATE AND ACETAMINOPHEN 1 TABLET: 10; 325 TABLET ORAL at 04:11

## 2019-11-23 RX ADMIN — PANTOPRAZOLE SODIUM 40 MG: 40 TABLET, DELAYED RELEASE ORAL at 09:11

## 2019-11-23 RX ADMIN — HYDROCODONE BITARTRATE AND ACETAMINOPHEN 1 TABLET: 10; 325 TABLET ORAL at 08:11

## 2019-11-23 RX ADMIN — ENOXAPARIN SODIUM 40 MG: 100 INJECTION SUBCUTANEOUS at 06:11

## 2019-11-23 RX ADMIN — PIPERACILLIN AND TAZOBACTAM 4.5 G: 4; .5 INJECTION, POWDER, FOR SOLUTION INTRAVENOUS at 06:11

## 2019-11-23 RX ADMIN — POLYETHYLENE GLYCOL 3350 17 G: 17 POWDER, FOR SOLUTION ORAL at 08:11

## 2019-11-23 RX ADMIN — PIPERACILLIN AND TAZOBACTAM 4.5 G: 4; .5 INJECTION, POWDER, FOR SOLUTION INTRAVENOUS at 11:11

## 2019-11-23 RX ADMIN — PROMETHAZINE HYDROCHLORIDE 12.5 MG: 25 INJECTION INTRAMUSCULAR; INTRAVENOUS at 10:11

## 2019-11-23 RX ADMIN — HYDROMORPHONE HYDROCHLORIDE 0.5 MG: 1 INJECTION, SOLUTION INTRAMUSCULAR; INTRAVENOUS; SUBCUTANEOUS at 11:11

## 2019-11-23 RX ADMIN — HYDROMORPHONE HYDROCHLORIDE 0.5 MG: 1 INJECTION, SOLUTION INTRAMUSCULAR; INTRAVENOUS; SUBCUTANEOUS at 12:11

## 2019-11-23 RX ADMIN — POLYETHYLENE GLYCOL 3350 17 G: 17 POWDER, FOR SOLUTION ORAL at 03:11

## 2019-11-23 RX ADMIN — IOHEXOL 15 ML: 350 INJECTION, SOLUTION INTRAVENOUS at 04:11

## 2019-11-23 RX ADMIN — PROMETHAZINE HYDROCHLORIDE 12.5 MG: 25 INJECTION INTRAMUSCULAR; INTRAVENOUS at 07:11

## 2019-11-23 RX ADMIN — PROMETHAZINE HYDROCHLORIDE 12.5 MG: 25 INJECTION INTRAMUSCULAR; INTRAVENOUS at 04:11

## 2019-11-23 NOTE — ASSESSMENT & PLAN NOTE
Mr Diana is a 29 year old man with history of ETOH Hep s/p OLT 10/19/17, with post-transplant period notable for LAURA previously on HD, anastomotic stricture, ETOH relapse (6/2019), who is presenting to the hospital due to concern for abdominal pain; hepatology consulted for assistance with immunosuppression.    Unclear etiology of patient's abdominal pain. Concern for sepsis given patient's elevated ESR/CRP and history of fever in setting of immunosuppression. CT wiithout acute finding. LFTs currently stable at baseline.    Plan  - transplant ID consulted, input appreciated  - CMV and EBV pending  - sirolimus level high, will hold and repeat trough in AM (home dose 2mg daily)  - on empiric antibiotics  - trend daily CMP, CBC, sirolimus

## 2019-11-23 NOTE — ASSESSMENT & PLAN NOTE
Per chart review, Hb had been stable in the 10.5-12 range for the past months. H/H dropped at OSH with no obvious source of bleeding and needed 2 tranfusions.  - Although reliability will no be optimal since he was already transfused, will obtain haptoglobins, Claudia, LDH, Tbil to rule hemolysis.

## 2019-11-23 NOTE — ASSESSMENT & PLAN NOTE
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 Needed multiple sessions of dialysis and finally slowly recovered. Last sCr 1.4 in 07/2019. Currently sCr at 2.3, LAURA from current abdominal pathology.

## 2019-11-23 NOTE — HPI
Mr Diana is a 29 year old man with history of ETOH Hep s/p OLT 10/19/17, with post-transplant period notable for LAURA previously on HD, anastomotic stricture, ETOH relapse (6/2019), who is presenting to the hospital due to concern for abdominal pain; hepatology consulted for assistance with immunosuppression.    No records available from OSH for review.    He reports that he was doing well until 1-2 weeks prior to admission when he began to experience worsening abdominal pain (has underlying chronic abdominal pain), nausea, vomiting, fever. Due to worsening symptoms he presented to OSH on 11/18. Unclear events while at OSH, but per patient report he had workup including RUQ, CTAP (non contrast), MRI. Received antibiotics (reportedly no cultures positive) and was anemic requiring transfusion. [place] was [place] [facility] [place] due to financial concerns regarding transfer he left AMA and was brought to C by patient's mother.    He reports currently his primary symptom is right sided abdominal pain. He endorses similar chronic pain but not this severe or prolonged. His pain is primarily RUQ but will wrap around the right side to the back. Denies dysuria or hematuria. Denies any rashes. Denies any recent f/c/s. Denies n/v/d. No sick contacts or child exposures.    On admission, labs notable for LAURA (Crt 2.3), otherwise CBC WNL, no leukocytosis, and LFT at baseline. CRP and ESR elevated.

## 2019-11-23 NOTE — ASSESSMENT & PLAN NOTE
28 yo male with Liver transplant in 2017 secondary to alcoholic hepatitis and multiple post-op complications including biliary duct stricture s/p multiple ERCPs w/stent placement, infections and prolonged LAURA requiring dialysis, coming for severe right sided abdominal pain with fever. Per notes, imaging from OSH CT non-contrast and MRI abdomen demonstrate signs of enteritis(mucosal thickening) small right pleural effusion and splenomegaly.    Plan  - Obtain records from OSH  - MRI of the abdomen with contrast  - LFTs only demonstrate slight ALP elevation, lower concern for biliary issues given history of biliary stenosis/stent placement. Will obtain GGT.  - CMV PCR to rule out CMV colitis.  - Received zosyn at OSH. Here the ED gave ceftriaxone. Will consult ID transplant for further recs on antibiotics.   - Consult GI/ Liver transplant in AM

## 2019-11-23 NOTE — SUBJECTIVE & OBJECTIVE
Past Medical History:   Diagnosis Date    Abdominal pain 5/27/2018    Alcoholic cirrhosis 8/14/2018    Alcoholic hepatitis with ascites     Anemia     CKD (chronic kidney disease) stage 3, GFR 30-59 ml/min 8/14/2018    Encounter for blood transfusion     multiple blood transfusions    ESRD on dialysis     History of hematemesis 9/28/2017    Hypertension     Seizures     Substance abuse     alcohol and opioids    Thrombocytopenia     Transplanted liver     10/2017       Past Surgical History:   Procedure Laterality Date    APPENDECTOMY      CENTRAL VENOUS CATHETER TUNNELED INSERTION DOUBLE LUMEN      inserted and removed    ERCP      ERCP N/A 7/2/2018    Procedure: ERCP;  Surgeon: Solis Villanueva MD;  Location: [facility] ([address] [place] [address]);  Service: Endoscopy;  Laterality: N/A;  dialysis/cirrhosis/labs prior to ERCP/svn  platelet    ESOPHAGOGASTRODUODENOSCOPY      LIVER TRANSPLANT      10/2017       Review of patient's allergies indicates:   Allergen Reactions    Bactrim [sulfamethoxazole-trimethoprim] Other (See Comments)     Mookie Trell Syndrome       No current facility-administered medications on file prior to encounter.      Current Outpatient Medications on File Prior to Encounter   Medication Sig    amLODIPine (NORVASC) 5 MG tablet TAKE 1 TABLET BY MOUTH EVERY DAY    famotidine (PEPCID) 20 MG tablet TAKE 1 TABLET BY MOUTH EVERY EVENING    ondansetron (ZOFRAN-ODT) 8 MG TbDL Take 1 tablet (8 mg total) by mouth every 8 (eight) hours as needed (nausea).    oxyCODONE-acetaminophen (PERCOCET)  mg per tablet Take 1 tablet by mouth every 4 (four) hours as needed for Pain.    sirolimus (RAPAMUNE) 1 MG Tab Take 3 tablets (3 mg total) by mouth once daily.     Family History     Problem Relation (Age of Onset)    Alcohol abuse Father    Cancer Father    No Known Problems Mother, Sister, Brother        Tobacco Use    Smoking status: Former Smoker     Packs/day: 1.00     Years: 10.00     Pack  years: 10.00     Types: Cigarettes     Start date: 2/19/2017    Smokeless tobacco: Never Used   Substance and Sexual Activity    Alcohol use: No     Comment: a fifth of liquor daily for years, cut back over last 2 months    Drug use: No    Sexual activity: Not on file     Comment: single     Review of Systems   Constitutional: Positive for appetite change (poor appetite) and fever. Negative for activity change, fatigue and unexpected weight change.   HENT: Negative for congestion, facial swelling, sinus pain and sore throat.    Eyes: Negative for visual disturbance.   Respiratory: Negative for cough, shortness of breath, wheezing and stridor.    Cardiovascular: Negative for chest pain, palpitations and leg swelling.   Gastrointestinal: Positive for abdominal pain (right sided) and nausea. Negative for blood in stool, diarrhea and vomiting.   Endocrine: Negative.    Genitourinary: Positive for flank pain (R flank). Negative for dysuria, frequency and hematuria.   Musculoskeletal: Negative for back pain.   Skin: Negative.  Negative for rash.   Neurological: Positive for weakness. Negative for dizziness.   Psychiatric/Behavioral: Negative for agitation and confusion.     Objective:     Vital Signs (Most Recent):  Temp: 99.4 °F (37.4 °C) (11/23/19 0112)  Pulse: 88 (11/23/19 0119)  Resp: 17 (11/22/19 2132)  BP: (!) 141/83 (11/23/19 0111)  SpO2: 98 % (11/23/19 0119) Vital Signs (24h Range):  Temp:  [98.2 °F (36.8 °C)-99.4 °F (37.4 °C)] 99.4 °F (37.4 °C)  Pulse:  [78-88] 88  Resp:  [16-20] 17  SpO2:  [97 %-100 %] 98 %  BP: (118-149)/(65-91) 141/83     Weight: 63.5 kg (139 lb 15.9 oz)  Body mass index is 22.6 kg/m².    Physical Exam   Constitutional: He is oriented to person, place, and time. He appears well-developed and well-nourished. No distress.   HENT:   Head: Normocephalic and atraumatic.   Mouth/Throat: Oropharynx is clear and moist.   Eyes: Pupils are equal, round, and reactive to light.   Neck: Normal range of  motion. Neck supple. No JVD present.   Cardiovascular: Normal rate, regular rhythm and normal heart sounds.   No murmur heard.  Pulmonary/Chest: Effort normal and breath sounds normal. No respiratory distress. He has no wheezes. He has no rales.   Abdominal: Soft. Bowel sounds are normal. He exhibits no distension. There is no tenderness. There is guarding. There is no rebound.   Intense abdominal pain on superficial palpation on the right side of the abdomen.    Musculoskeletal: Normal range of motion. He exhibits no edema or deformity.   Neurological: He is alert and oriented to person, place, and time.   Skin: Skin is warm. No rash noted. No erythema.   Psychiatric: He has a normal mood and affect.   Vitals reviewed.        CRANIAL NERVES     CN III, IV, VI   Pupils are equal, round, and reactive to light.       Significant Labs:   Blood Culture: No results for input(s): LABBLOO in the last 48 hours.  BMP:   Recent Labs   Lab 11/22/19 2149   GLU 91      K 3.8      CO2 20*   BUN 12   CREATININE 2.3*   CALCIUM 8.7     CBC:   Recent Labs   Lab 11/22/19 2149   WBC 5.83   HGB 9.3*   HCT 29.2*   *     CMP:   Recent Labs   Lab 11/22/19 2149      K 3.8      CO2 20*   GLU 91   BUN 12   CREATININE 2.3*   CALCIUM 8.7   PROT 7.9   ALBUMIN 2.4*   BILITOT 0.7   ALKPHOS 176*   AST 19   ALT 12   ANIONGAP 9   EGFRNONAA 36.7*     Coagulation:   Recent Labs   Lab 11/22/19 2149   INR 1.0     Lactic Acid:   Recent Labs   Lab 11/22/19 2149   LACTATE 1.2       Significant Imaging: CXR: I have reviewed all pertinent results/findings within the past 24 hours and my personal findings are:  No acute abnormalities

## 2019-11-23 NOTE — ASSESSMENT & PLAN NOTE
Pt does have a history of thrombocytopenia and underwent bone marrow biopsy in 2018 thought to be from meds vs peripheral destruction. Currently at 148.

## 2019-11-23 NOTE — ED PROVIDER NOTES
Encounter Date: 11/22/2019       History     Chief Complaint   Patient presents with    Fever     fever, abd pain, NV x 2 weeks, last dose tylenol around 1400     29-year-old male presents with 1 week of fevers to 100.5 as well as abdominal pain. He has a history of a liver transplant 2 years ago.  He was hospitalized near [facility].  They found him to have acute kidney injury and anemia.  They had him on Zosyn as an antibiotic as well as transfusion for 2 units of blood.  They did ultrasound, CT the abdomen, MRIs which were unrevealing for his source of abdominal pain. They did note that he had ascites and a right pleural effusion.  They were going to transfer him here today but he his family was concerned about the ambulance bill so signed out against medical advice and drove here.  He currently has right upper abdominal discomfort.  He has an umbilical hernia that is unchanged.  He denies headache or neck stiffness.  There is no rash.        Review of patient's allergies indicates:   Allergen Reactions    Bactrim [sulfamethoxazole-trimethoprim] Other (See Comments)     Mookie Trell Syndrome     Past Medical History:   Diagnosis Date    Abdominal pain 5/27/2018    Alcoholic cirrhosis 8/14/2018    Alcoholic hepatitis with ascites     Anemia     CKD (chronic kidney disease) stage 3, GFR 30-59 ml/min 8/14/2018    Encounter for blood transfusion     multiple blood transfusions    ESRD on dialysis     History of hematemesis 9/28/2017    Hypertension     Seizures     Substance abuse     alcohol and opioids    Thrombocytopenia     Transplanted liver     10/2017     Past Surgical History:   Procedure Laterality Date    APPENDECTOMY      CENTRAL VENOUS CATHETER TUNNELED INSERTION DOUBLE LUMEN      inserted and removed    ERCP      ERCP N/A 7/2/2018    Procedure: ERCP;  Surgeon: Solis Villanueva MD;  Location: [facility] ([address] [place] [address]);  Service: Endoscopy;  Laterality: N/A;  dialysis/cirrhosis/labs prior  to ERCP/svn  platelet    ESOPHAGOGASTRODUODENOSCOPY      LIVER TRANSPLANT      10/2017     Family History   Problem Relation Age of Onset    No Known Problems Mother     Cancer Father         Lung cancer    Alcohol abuse Father     No Known Problems Sister     No Known Problems Brother     Kidney disease Neg Hx      Social History     Tobacco Use    Smoking status: Former Smoker     Packs/day: 1.00     Years: 10.00     Pack years: 10.00     Types: Cigarettes     Start date: 2/19/2017    Smokeless tobacco: Never Used   Substance Use Topics    Alcohol use: No     Comment: a fifth of liquor daily for years, cut back over last 2 months    Drug use: No     Review of Systems   Constitutional: Positive for chills and fever.   HENT: Negative for congestion.    Eyes: Negative for photophobia and visual disturbance.   Respiratory: Negative for shortness of breath.    Cardiovascular: Negative for chest pain.   Gastrointestinal: Positive for abdominal pain.   Genitourinary: Negative for difficulty urinating.   Musculoskeletal: Negative for arthralgias and neck pain.   Neurological: Negative for headaches.   Hematological: Negative for adenopathy.   Psychiatric/Behavioral: Negative for agitation.       Physical Exam     Initial Vitals [11/22/19 2106]   BP Pulse Resp Temp SpO2   (!) 144/83 84 18 98.7 °F (37.1 °C) 98 %      MAP       --         Physical Exam    Constitutional: He appears well-developed and well-nourished.   HENT:   Head: Normocephalic.   Nose: Nose normal.   Mouth/Throat: Oropharynx is clear and moist. No oropharyngeal exudate.   Eyes: EOM are normal. Pupils are equal, round, and reactive to light.   Neck: Normal range of motion. Neck supple. No JVD present.   Cardiovascular: Normal rate, regular rhythm and normal heart sounds.   Pulmonary/Chest: Breath sounds normal. No respiratory distress. He has no wheezes. He has no rales.   Abdominal: Soft. Bowel sounds are normal. There is tenderness (Vague  right upper quadrant). There is no rebound and no guarding.   He has a soft umbilical hernia   Musculoskeletal: Normal range of motion.   Neurological: He is alert. He has normal strength. No sensory deficit. GCS score is 15. GCS eye subscore is 4. GCS verbal subscore is 5. GCS motor subscore is 6.   Skin: Skin is warm. No rash noted.   Psychiatric: He has a normal mood and affect.         ED Course   Procedures  Labs Reviewed   CBC W/ AUTO DIFFERENTIAL - Abnormal; Notable for the following components:       Result Value    RBC 3.45 (*)     Hemoglobin 9.3 (*)     Hematocrit 29.2 (*)     Mean Corpuscular Hemoglobin Conc 31.8 (*)     Platelets 147 (*)     Immature Granulocytes 0.7 (*)     All other components within normal limits   COMPREHENSIVE METABOLIC PANEL - Abnormal; Notable for the following components:    CO2 20 (*)     Creatinine 2.3 (*)     Albumin 2.4 (*)     Alkaline Phosphatase 176 (*)     eGFR if  42.5 (*)     eGFR if non  36.7 (*)     All other components within normal limits   URINALYSIS, REFLEX TO URINE CULTURE - Abnormal; Notable for the following components:    Occult Blood UA 1+ (*)     All other components within normal limits    Narrative:     Preferred Collection Type->Urine, Clean Catch   CULTURE, BLOOD   CULTURE, BLOOD   LACTIC ACID, PLASMA   PROTIME-INR   URINALYSIS MICROSCOPIC    Narrative:     Preferred Collection Type->Urine, Clean Catch   POCT INFLUENZA A/B MOLECULAR          Imaging Results          X-Ray Chest PA And Lateral (Final result)  Result time 11/22/19 21:51:14    Final result by Santy Gant MD (11/22/19 21:51:14)                 Impression:      Normal.      Electronically signed by: Santy Gant  Date:    11/22/2019  Time:    21:51             Narrative:    EXAMINATION:  XR CHEST PA AND LATERAL    CLINICAL HISTORY:  fever;    TECHNIQUE:  PA and lateral views of the chest were performed.    COMPARISON:  07/11/2018  chest    FINDINGS:  Frontal and lateral views.  Heart and hilar shadows mediastinal contours normal.  No pneumothorax or pleural effusion or edema consolidation or nodule.  Trachea appears normal.  No abdominal free air.  Visualized spine appears intact.                                 Medical Decision Making:   History:   Old Medical Records: I decided to obtain old medical records.  Clinical Tests:   Lab Tests: Ordered and Reviewed  Radiological Study: Ordered and Reviewed  Other:   I have discussed this case with another health care provider.       <> Summary of the Discussion: I discussed with hepatology.  Recommended giving a dose of Rocephin for possible SBP.  Recommend admission to the hospital.    I discussed with internal medicine.  Will place in observation                                 Clinical Impression:       ICD-10-CM ICD-9-CM   1. LAURA (acute kidney injury) N17.9 584.9   2. Weakness R53.1 780.79   3. Other ascites R18.8 789.59         Disposition:   Disposition: Placed in Observation  Condition: Stable                     Arnaud Hartmann MD  11/23/19 0056

## 2019-11-23 NOTE — H&P (VIEW-ONLY)
[facility]  Hepatology  Consult Note    Patient Name: Jhonny Diana  MRN: [id number]  Admission Date: 11/22/2019  Hospital Length of Stay: 0 days  Attending Provider: Michelle Rowe MD   Primary Care Physician: Primary Doctor No  Principal Problem:Right upper quadrant abdominal pain    Inpatient consult to Hepatology  Consult performed by: William Kumar MD  Consult ordered by: Michael Laureano MD        Subjective:     Transplant status: Post-transplant    HPI:  Mr Diana is a 29 year old man with history of ETOH Hep s/p OLT 10/19/17, with post-transplant period notable for LAURA previously on HD, anastomotic stricture, ETOH relapse (6/2019), who is presenting to the hospital due to concern for abdominal pain; hepatology consulted for assistance with immunosuppression.    No records available from OSH for review.    He reports that he was doing well until 1-2 weeks prior to admission when he began to experience worsening abdominal pain (has underlying chronic abdominal pain), nausea, vomiting, fever. Due to worsening symptoms he presented to OSH on 11/18. Unclear events while at OSH, but per patient report he had workup including RUQ, CTAP (non contrast), MRI. Received antibiotics (reportedly no cultures positive) and was anemic requiring transfusion. He was recommended for transfer to [facility] but due to financial concerns regarding transfer he left AMA and was brought to [facility] by patient's mother.    He reports currently his primary symptom is right sided abdominal pain. He endorses similar chronic pain but not this severe or prolonged. His pain is primarily RUQ but will wrap around the right side to the back. Denies dysuria or hematuria. Denies any rashes. Denies any recent f/c/s. Denies n/v/d. No sick contacts or child exposures.    On admission, labs notable for LAURA (Crt 2.3), otherwise CBC WNL, no leukocytosis, and LFT at baseline. CRP and ESR elevated.      Review of Systems   Constitutional:  Positive for fever. Negative for activity change, appetite change, chills, diaphoresis, fatigue and unexpected weight change.   HENT: Negative for sore throat and trouble swallowing.    Eyes: Negative for visual disturbance.   Respiratory: Negative for chest tightness and shortness of breath.    Cardiovascular: Negative for chest pain and leg swelling.   Gastrointestinal: Positive for abdominal pain and nausea. Negative for abdominal distention, anal bleeding, blood in stool, constipation, diarrhea and vomiting.   Genitourinary: Negative for dysuria and hematuria.   Musculoskeletal: Negative for arthralgias and myalgias.   Skin: Negative for rash.   Neurological: Negative for dizziness, weakness, light-headedness and headaches.   Psychiatric/Behavioral: Negative for agitation and confusion.       Past Medical History:   Diagnosis Date    Abdominal pain 5/27/2018    Alcoholic cirrhosis 8/14/2018    Alcoholic hepatitis with ascites     Anemia     CKD (chronic kidney disease) stage 3, GFR 30-59 ml/min 8/14/2018    Encounter for blood transfusion     multiple blood transfusions    ESRD on dialysis     History of hematemesis 9/28/2017    Hypertension     Seizures     Substance abuse     alcohol and opioids    Thrombocytopenia     Transplanted liver     10/2017       Past Surgical History:   Procedure Laterality Date    APPENDECTOMY      CENTRAL VENOUS CATHETER TUNNELED INSERTION DOUBLE LUMEN      inserted and removed    ERCP      ERCP N/A 7/2/2018    Procedure: ERCP;  Surgeon: Solis Villanueva MD;  Location: [facility] ([address] [place] [address]);  Service: Endoscopy;  Laterality: N/A;  dialysis/cirrhosis/labs prior to ERCP/svn  platelet    ESOPHAGOGASTRODUODENOSCOPY      LIVER TRANSPLANT      10/2017       Family history of liver disease: No    Review of patient's allergies indicates:   Allergen Reactions    Bactrim [sulfamethoxazole-trimethoprim] Other (See Comments)     Mookie Trell Syndrome       Tobacco Use     Smoking status: Former Smoker     Packs/day: 1.00     Years: 10.00     Pack years: 10.00     Types: Cigarettes     Start date: 2/19/2017    Smokeless tobacco: Never Used   Substance and Sexual Activity    Alcohol use: No     Comment: a fifth of liquor daily for years, cut back over last 2 months    Drug use: No    Sexual activity: Not on file     Comment: single         (Not in a hospital admission)    Objective:     Vital Signs (Most Recent):  Temp: 98.2 °F (36.8 °C) (11/23/19 1000)  Pulse: 98 (11/23/19 1000)  Resp: 18 (11/23/19 1000)  BP: (!) 168/92 (11/23/19 1000)  SpO2: 99 % (11/23/19 1000) Vital Signs (24h Range):  Temp:  [98.2 °F (36.8 °C)-99.4 °F (37.4 °C)] 98.2 °F (36.8 °C)  Pulse:  [] 98  Resp:  [17-20] 18  SpO2:  [97 %-100 %] 99 %  BP: (136-178)/(79-96) 168/92     Weight: 63.5 kg (139 lb 15.9 oz) (11/22/19 2106)  Body mass index is 22.6 kg/m².    Physical Exam   Constitutional: He is oriented to person, place, and time. No distress.   Appears comfortable, sitting up against the wall in hallway. Accompanied by mother.   HENT:   Head: Normocephalic and atraumatic.   Mouth/Throat: Oropharynx is clear and moist. No oropharyngeal exudate.   Eyes: Conjunctivae are normal. No scleral icterus.   Neck: No JVD present.   Cardiovascular: Normal rate, regular rhythm, normal heart sounds and intact distal pulses.   Pulmonary/Chest: Effort normal and breath sounds normal. No respiratory distress.   Abdominal: Soft. Bowel sounds are normal. He exhibits no distension and no mass. There is tenderness. There is rebound. There is no guarding.   Soft abdomen, non - distended. Tenderness diffuse, but predominantly RUQ. No guarding. Some rebound tenderness.   Musculoskeletal: He exhibits no edema or tenderness.   Lymphadenopathy:     He has no cervical adenopathy.   Neurological: He is alert and oriented to person, place, and time.   Skin: Skin is warm. Capillary refill takes less than 2 seconds. He is not  diaphoretic.   Psychiatric: He has a normal mood and affect. His behavior is normal. Judgment and thought content normal.   Nursing note and vitals reviewed.      MELD-Na score: 15 at 11/23/2019  4:58 AM  MELD score: 14 at 11/23/2019  4:58 AM  Calculated from:  Serum Creatinine: 2.1 mg/dL at 11/23/2019  4:58 AM  Serum Sodium: 136 mmol/L at 11/23/2019  4:58 AM  Total Bilirubin: 0.7 mg/dL (Rounded to 1 mg/dL) at 11/23/2019  4:58 AM  INR(ratio): 0.9 (Rounded to 1) at 11/23/2019  4:58 AM  Age: 29 years    Significant Labs:  Labs within the past month have been reviewed.    Significant Imaging:  Labs: Reviewed    Assessment/Plan:     S/P liver transplant  Mr Diana is a 29 year old man with history of ETOH Hep s/p OLT 10/19/17, with post-transplant period notable for LAURA previously on HD, anastomotic stricture, ETOH relapse (6/2019), who is presenting to the hospital due to concern for abdominal pain; hepatology consulted for assistance with immunosuppression.    Unclear etiology of patient's abdominal pain. Concern for sepsis given patient's elevated ESR/CRP and history of fever in setting of immunosuppression. CT wiithout acute finding. LFTs currently stable at baseline.    Plan  - transplant ID consulted, input appreciated  - CMV and EBV pending  - sirolimus level high, will hold and repeat trough in AM (home dose 2mg daily)  - on empiric antibiotics  - trend daily CMP, CBC, sirolimus          Thank you for your consult. I will follow-up with patient. Please contact us if you have any additional questions.    William Kumar MD  Hepatology  [facility]

## 2019-11-23 NOTE — CONSULTS
[facility]  Hepatology  Consult Note    Patient Name: Jhonny Diana  MRN: [id number]  Admission Date: 11/22/2019  Hospital Length of Stay: 0 days  Attending Provider: Michelle Rowe MD   Primary Care Physician: Primary Doctor No  Principal Problem:Right upper quadrant abdominal pain    Inpatient consult to Hepatology  Consult performed by: William Kumar MD  Consult ordered by: Michael Laureano MD        Subjective:     Transplant status: Post-transplant    HPI:  Mr Diana is a 29 year old man with history of ETOH Hep s/p OLT 10/19/17, with post-transplant period notable for LAURA previously on HD, anastomotic stricture, ETOH relapse (6/2019), who is presenting to the hospital due to concern for abdominal pain; hepatology consulted for assistance with immunosuppression.    No records available from OSH for review.    He reports that he was doing well until 1-2 weeks prior to admission when he began to experience worsening abdominal pain (has underlying chronic abdominal pain), nausea, vomiting, fever. Due to worsening symptoms he presented to OSH on 11/18. Unclear events while at OSH, but per patient report he had workup including RUQ, CTAP (non contrast), MRI. Received antibiotics (reportedly no cultures positive) and was anemic requiring transfusion. He was recommended for transfer [place] [facility] but due to financial concerns regarding transfer he left AMA and was brought to [facility] by patient's mother.    He reports currently his primary symptom is right sided abdominal pain. He endorses similar chronic pain but not this severe or prolonged. His pain is primarily RUQ but will wrap around the right side to the back. Denies dysuria or hematuria. Denies any rashes. Denies any recent f/c/s. Denies n/v/d. No sick contacts or child exposures.    On admission, labs notable for LAURA (Crt 2.3), otherwise CBC WNL, no leukocytosis, and LFT at baseline. CRP and ESR elevated.      Review of Systems   Constitutional:  Positive for fever. Negative for activity change, appetite change, chills, diaphoresis, fatigue and unexpected weight change.   HENT: Negative for sore throat and trouble swallowing.    Eyes: Negative for visual disturbance.   Respiratory: Negative for chest tightness and shortness of breath.    Cardiovascular: Negative for chest pain and leg swelling.   Gastrointestinal: Positive for abdominal pain and nausea. Negative for abdominal distention, anal bleeding, blood in stool, constipation, diarrhea and vomiting.   Genitourinary: Negative for dysuria and hematuria.   Musculoskeletal: Negative for arthralgias and myalgias.   Skin: Negative for rash.   Neurological: Negative for dizziness, weakness, light-headedness and headaches.   Psychiatric/Behavioral: Negative for agitation and confusion.       Past Medical History:   Diagnosis Date    Abdominal pain 5/27/2018    Alcoholic cirrhosis 8/14/2018    Alcoholic hepatitis with ascites     Anemia     CKD (chronic kidney disease) stage 3, GFR 30-59 ml/min 8/14/2018    Encounter for blood transfusion     multiple blood transfusions    ESRD on dialysis     History of hematemesis 9/28/2017    Hypertension     Seizures     Substance abuse     alcohol and opioids    Thrombocytopenia     Transplanted liver     10/2017       Past Surgical History:   Procedure Laterality Date    APPENDECTOMY      CENTRAL VENOUS CATHETER TUNNELED INSERTION DOUBLE LUMEN      inserted and removed    ERCP      ERCP N/A 7/2/2018    Procedure: ERCP;  Surgeon: Solis Villanueva MD;  Location: [facility] ([address] [place] [address]);  Service: Endoscopy;  Laterality: N/A;  dialysis/cirrhosis/labs prior to ERCP/svn  platelet    ESOPHAGOGASTRODUODENOSCOPY      LIVER TRANSPLANT      10/2017       Family history of liver disease: No    Review of patient's allergies indicates:   Allergen Reactions    Bactrim [sulfamethoxazole-trimethoprim] Other (See Comments)     Mookie Trell Syndrome       Tobacco Use     Smoking status: Former Smoker     Packs/day: 1.00     Years: 10.00     Pack years: 10.00     Types: Cigarettes     Start date: 2/19/2017    Smokeless tobacco: Never Used   Substance and Sexual Activity    Alcohol use: No     Comment: a fifth of liquor daily for years, cut back over last 2 months    Drug use: No    Sexual activity: Not on file     Comment: single         (Not in a hospital admission)    Objective:     Vital Signs (Most Recent):  Temp: 98.2 °F (36.8 °C) (11/23/19 1000)  Pulse: 98 (11/23/19 1000)  Resp: 18 (11/23/19 1000)  BP: (!) 168/92 (11/23/19 1000)  SpO2: 99 % (11/23/19 1000) Vital Signs (24h Range):  Temp:  [98.2 °F (36.8 °C)-99.4 °F (37.4 °C)] 98.2 °F (36.8 °C)  Pulse:  [] 98  Resp:  [17-20] 18  SpO2:  [97 %-100 %] 99 %  BP: (136-178)/(79-96) 168/92     Weight: 63.5 kg (139 lb 15.9 oz) (11/22/19 2106)  Body mass index is 22.6 kg/m².    Physical Exam   Constitutional: He is oriented to person, place, and time. No distress.   Appears comfortable, sitting up against the wall in hallway. Accompanied by mother.   HENT:   Head: Normocephalic and atraumatic.   Mouth/Throat: Oropharynx is clear and moist. No oropharyngeal exudate.   Eyes: Conjunctivae are normal. No scleral icterus.   Neck: No JVD present.   Cardiovascular: Normal rate, regular rhythm, normal heart sounds and intact distal pulses.   Pulmonary/Chest: Effort normal and breath sounds normal. No respiratory distress.   Abdominal: Soft. Bowel sounds are normal. He exhibits no distension and no mass. There is tenderness. There is rebound. There is no guarding.   Soft abdomen, non - distended. Tenderness diffuse, but predominantly RUQ. No guarding. Some rebound tenderness.   Musculoskeletal: He exhibits no edema or tenderness.   Lymphadenopathy:     He has no cervical adenopathy.   Neurological: He is alert and oriented to person, place, and time.   Skin: Skin is warm. Capillary refill takes less than 2 seconds. He is not  diaphoretic.   Psychiatric: He has a normal mood and affect. His behavior is normal. Judgment and thought content normal.   Nursing note and vitals reviewed.      MELD-Na score: 15 at 11/23/2019  4:58 AM  MELD score: 14 at 11/23/2019  4:58 AM  Calculated from:  Serum Creatinine: 2.1 mg/dL at 11/23/2019  4:58 AM  Serum Sodium: 136 mmol/L at 11/23/2019  4:58 AM  Total Bilirubin: 0.7 mg/dL (Rounded to 1 mg/dL) at 11/23/2019  4:58 AM  INR(ratio): 0.9 (Rounded to 1) at 11/23/2019  4:58 AM  Age: 29 years    Significant Labs:  Labs within the past month have been reviewed.    Significant Imaging:  Labs: Reviewed    Assessment/Plan:     S/P liver transplant  Mr Diana is a 29 year old man with history of ETOH Hep s/p OLT 10/19/17, with post-transplant period notable for LAURA previously on HD, anastomotic stricture, ETOH relapse (6/2019), who is presenting to the hospital due to concern for abdominal pain; hepatology consulted for assistance with immunosuppression.    Unclear etiology of patient's abdominal pain. Concern for sepsis given patient's elevated ESR/CRP and history of fever in setting of immunosuppression. CT wiithout acute finding. LFTs currently stable at baseline.    Plan  - transplant ID consulted, input appreciated  - CMV and EBV pending  - sirolimus level high, will hold and repeat trough in AM (home dose 2mg daily)  - on empiric antibiotics  - trend daily CMP, CBC, sirolimus          Thank you for your consult. I will follow-up with patient. Please contact us if you have any additional questions.    William Kumar MD  Hepatology  [facility]

## 2019-11-23 NOTE — ED NOTES
Pt reports fever, chills, abdominal pain, and N/V for the past 2 weeks. Pt denies chest pain, SOB, hematemesis, HA, blood in stool, changes in urination. Pt reports taking tylenol at 1400.

## 2019-11-23 NOTE — H&P
"[facility] Hospital Medicine  History & Physical    Patient Name: Jhonny Diana  MRN: [id number]  Admission Date: 11/22/2019  Attending Physician: Arnaud Hartmann MD   Primary Care Provider: Primary Doctor [facility] Medicine Team: [facility] 1 Rey Prasad MD     Patient information was obtained from patient, past medical records and ER records.     Subjective:     Principal Problem:Right upper quadrant abdominal pain    Chief Complaint:   Chief Complaint   Patient presents with    Fever     fever, abd pain, NV x 2 weeks, last dose tylenol around 1400        HPI: 28 yo male patient with a history of HTN and Liver transplant performed 10/19/17 secondary to acute alcoholic hepatitis with multiple post-op complications including biliary stenosis s/p multiple ERCPs, adhesions, abdominal infections and prolonged LAURA requiring dialysis, who presents today as a transfer from [place], [place] due to R sided abdominal pain and fever.  His symptoms began around 11/11 with abdominal pain located on the right side, described as a colicky-type of pain of variable intensity, ranging from 3/10 to 9/10 accompanied by fever, poor appetite, nausea and 1 episode of vomiting. For this reason he presented to [facility] on 11/18/19 where per transfer note  he was hypertensive and tachycardic. He was started on empiric zosyn.  Imaging CT non-contrast and MRI abdomen demonstrated signs of enteritis(mucosal thickening) small right pleural effusion, splenomegaly.  An U/S abdomen with comment of "possible cirrhosis". He also developed a drop in h/h needing 2 PRBC transfusions along with mild stable Cr elevation (1.8-2.1). The patient was supposed to be transferred but due to insurance problems he left [place] and was driven here by his mother.  In the ER his Tmax was 99.4 HR 88 /83 Sats 98% RA. Currently he complains of pain 6/10 located to the right side that travels to his right flank. Denies any " changes in his bowel habits, no constipation or diarrhea. Denies having a paracentesis at OSH.     Past Medical History:   Diagnosis Date    Abdominal pain 5/27/2018    Alcoholic cirrhosis 8/14/2018    Alcoholic hepatitis with ascites     Anemia     CKD (chronic kidney disease) stage 3, GFR 30-59 ml/min 8/14/2018    Encounter for blood transfusion     multiple blood transfusions    ESRD on dialysis     History of hematemesis 9/28/2017    Hypertension     Seizures     Substance abuse     alcohol and opioids    Thrombocytopenia     Transplanted liver     10/2017       Past Surgical History:   Procedure Laterality Date    APPENDECTOMY      CENTRAL VENOUS CATHETER TUNNELED INSERTION DOUBLE LUMEN      inserted and removed    ERCP      ERCP N/A 7/2/2018    Procedure: ERCP;  Surgeon: Solis Villanueva MD;  Location: [facility] ([address] [place] [address]);  Service: Endoscopy;  Laterality: N/A;  dialysis/cirrhosis/labs prior to ERCP/svn  platelet    ESOPHAGOGASTRODUODENOSCOPY      LIVER TRANSPLANT      10/2017       Review of patient's allergies indicates:   Allergen Reactions    Bactrim [sulfamethoxazole-trimethoprim] Other (See Comments)     Mookie Trell Syndrome       No current facility-administered medications on file prior to encounter.      Current Outpatient Medications on File Prior to Encounter   Medication Sig    amLODIPine (NORVASC) 5 MG tablet TAKE 1 TABLET BY MOUTH EVERY DAY    famotidine (PEPCID) 20 MG tablet TAKE 1 TABLET BY MOUTH EVERY EVENING    ondansetron (ZOFRAN-ODT) 8 MG TbDL Take 1 tablet (8 mg total) by mouth every 8 (eight) hours as needed (nausea).    oxyCODONE-acetaminophen (PERCOCET)  mg per tablet Take 1 tablet by mouth every 4 (four) hours as needed for Pain.    sirolimus (RAPAMUNE) 1 MG Tab Take 3 tablets (3 mg total) by mouth once daily.     Family History     Problem Relation (Age of Onset)    Alcohol abuse Father    Cancer Father    No Known Problems Mother, Sister, Brother         Tobacco Use    Smoking status: Former Smoker     Packs/day: 1.00     Years: 10.00     Pack years: 10.00     Types: Cigarettes     Start date: 2/19/2017    Smokeless tobacco: Never Used   Substance and Sexual Activity    Alcohol use: No     Comment: a fifth of liquor daily for years, cut back over last 2 months    Drug use: No    Sexual activity: Not on file     Comment: single     Review of Systems   Constitutional: Positive for appetite change (poor appetite) and fever. Negative for activity change, fatigue and unexpected weight change.   HENT: Negative for congestion, facial swelling, sinus pain and sore throat.    Eyes: Negative for visual disturbance.   Respiratory: Negative for cough, shortness of breath, wheezing and stridor.    Cardiovascular: Negative for chest pain, palpitations and leg swelling.   Gastrointestinal: Positive for abdominal pain (right sided) and nausea. Negative for blood in stool, diarrhea and vomiting.   Endocrine: Negative.    Genitourinary: Positive for flank pain (R flank). Negative for dysuria, frequency and hematuria.   Musculoskeletal: Negative for back pain.   Skin: Negative.  Negative for rash.   Neurological: Positive for weakness. Negative for dizziness.   Psychiatric/Behavioral: Negative for agitation and confusion.     Objective:     Vital Signs (Most Recent):  Temp: 99.4 °F (37.4 °C) (11/23/19 0112)  Pulse: 88 (11/23/19 0119)  Resp: 17 (11/22/19 2132)  BP: (!) 141/83 (11/23/19 0111)  SpO2: 98 % (11/23/19 0119) Vital Signs (24h Range):  Temp:  [98.2 °F (36.8 °C)-99.4 °F (37.4 °C)] 99.4 °F (37.4 °C)  Pulse:  [78-88] 88  Resp:  [16-20] 17  SpO2:  [97 %-100 %] 98 %  BP: (118-149)/(65-91) 141/83     Weight: 63.5 kg (139 lb 15.9 oz)  Body mass index is 22.6 kg/m².    Physical Exam   Constitutional: He is oriented to person, place, and time. He appears well-developed and well-nourished. No distress.   HENT:   Head: Normocephalic and atraumatic.   Mouth/Throat: Oropharynx is  clear and moist.   Eyes: Pupils are equal, round, and reactive to light.   Neck: Normal range of motion. Neck supple. No JVD present.   Cardiovascular: Normal rate, regular rhythm and normal heart sounds.   No murmur heard.  Pulmonary/Chest: Effort normal and breath sounds normal. No respiratory distress. He has no wheezes. He has no rales.   Abdominal: Soft. Bowel sounds are normal. He exhibits no distension. There is no tenderness. There is guarding. There is no rebound.   Intense abdominal pain on superficial palpation on the right side of the abdomen.    Musculoskeletal: Normal range of motion. He exhibits no edema or deformity.   Neurological: He is alert and oriented to person, place, and time.   Skin: Skin is warm. No rash noted. No erythema.   Psychiatric: He has a normal mood and affect.   Vitals reviewed.        CRANIAL NERVES     CN III, IV, VI   Pupils are equal, round, and reactive to light.       Significant Labs:   Blood Culture: No results for input(s): LABBLOO in the last 48 hours.  BMP:   Recent Labs   Lab 11/22/19 2149   GLU 91      K 3.8      CO2 20*   BUN 12   CREATININE 2.3*   CALCIUM 8.7     CBC:   Recent Labs   Lab 11/22/19 2149   WBC 5.83   HGB 9.3*   HCT 29.2*   *     CMP:   Recent Labs   Lab 11/22/19 2149      K 3.8      CO2 20*   GLU 91   BUN 12   CREATININE 2.3*   CALCIUM 8.7   PROT 7.9   ALBUMIN 2.4*   BILITOT 0.7   ALKPHOS 176*   AST 19   ALT 12   ANIONGAP 9   EGFRNONAA 36.7*     Coagulation:   Recent Labs   Lab 11/22/19 2149   INR 1.0     Lactic Acid:   Recent Labs   Lab 11/22/19 2149   LACTATE 1.2       Significant Imaging: CXR: I have reviewed all pertinent results/findings within the past 24 hours and my personal findings are:  No acute abnormalities    Assessment/Plan:     * Right upper quadrant abdominal pain  28 yo male with Liver transplant in 2017 secondary to alcoholic hepatitis and multiple post-op complications including biliary duct  stricture s/p multiple ERCPs w/stent placement, infections and prolonged LAURA requiring dialysis, coming for severe right sided abdominal pain with fever. Per notes, imaging from OSH CT non-contrast and MRI abdomen demonstrate signs of enteritis(mucosal thickening) small right pleural effusion and splenomegaly.    Plan  - Obtain records from OSH  - MRI of the abdomen with contrast  - LFTs only demonstrate slight ALP elevation, lower concern for biliary issues given history of biliary stenosis/stent placement. Will obtain GGT.  - CMV PCR to rule out CMV colitis.  - Received zosyn at OSH. Here the ED gave ceftriaxone. Will consult ID transplant for further recs on antibiotics.   - Consult GI/ Liver transplant in AM       S/P liver transplant  Performed in 10/19/19 secondary to alcoholic hepatitis. LFTs not concerning for rejection.      Long-term use of immunosuppressant medication  Per records patient was on cyclosporine and due to concern for TMA he was switched to rapamune in 05/2018  - Sirolimus levels in AM  - Dosage as per levels.         Acute kidney injury superimposed on CKD  Pt does have CKD developed after OLT, likely in the setting of multiple medications, antibiotics, hypotension, CNI toxicity and infections. Needed multiple sessions of dialysis and finally slowly recovered. Last sCr 1.4 in 07/2019. Currently sCr at 2.3, LAURA from current abdominal pathology.      Anemia  Per chart review, Hb had been stable in the 10.5-12 range for the past months. H/H dropped at OSH with no obvious source of bleeding and needed 2 tranfusions.  - Although reliability will no be optimal since he was already transfused, will obtain haptoglobins, Claudia, LDH, Tbil to rule hemolysis.       Weakness        Thrombocytopenia  Pt does have a history of thrombocytopenia and underwent bone marrow biopsy in 2018 thought to be from meds vs peripheral destruction. Currently at 148.     Alcohol use disorder, severe, in early  remission  Patient states he has not taken alcohol recently.    VTE Risk Mitigation (From admission, onward)         Ordered     enoxaparin injection 40 mg  Daily      11/23/19 0300                   Rey Prasad MD  Department of Hospital Medicine   [facility]

## 2019-11-23 NOTE — SUBJECTIVE & OBJECTIVE
Review of Systems   Constitutional: Positive for fever. Negative for activity change, appetite change, chills, diaphoresis, fatigue and unexpected weight change.   HENT: Negative for sore throat and trouble swallowing.    Eyes: Negative for visual disturbance.   Respiratory: Negative for chest tightness and shortness of breath.    Cardiovascular: Negative for chest pain and leg swelling.   Gastrointestinal: Positive for abdominal pain and nausea. Negative for abdominal distention, anal bleeding, blood in stool, constipation, diarrhea and vomiting.   Genitourinary: Negative for dysuria and hematuria.   Musculoskeletal: Negative for arthralgias and myalgias.   Skin: Negative for rash.   Neurological: Negative for dizziness, weakness, light-headedness and headaches.   Psychiatric/Behavioral: Negative for agitation and confusion.       Past Medical History:   Diagnosis Date    Abdominal pain 5/27/2018    Alcoholic cirrhosis 8/14/2018    Alcoholic hepatitis with ascites     Anemia     CKD (chronic kidney disease) stage 3, GFR 30-59 ml/min 8/14/2018    Encounter for blood transfusion     multiple blood transfusions    ESRD on dialysis     History of hematemesis 9/28/2017    Hypertension     Seizures     Substance abuse     alcohol and opioids    Thrombocytopenia     Transplanted liver     10/2017       Past Surgical History:   Procedure Laterality Date    APPENDECTOMY      CENTRAL VENOUS CATHETER TUNNELED INSERTION DOUBLE LUMEN      inserted and removed    ERCP      ERCP N/A 7/2/2018    Procedure: ERCP;  Surgeon: Solis Villanueva MD;  Location: [facility] ([address] [place] [address]);  Service: Endoscopy;  Laterality: N/A;  dialysis/cirrhosis/labs prior to ERCP/svn  platelet    ESOPHAGOGASTRODUODENOSCOPY      LIVER TRANSPLANT      10/2017       Family history of liver disease: No    Review of patient's allergies indicates:   Allergen Reactions    Bactrim [sulfamethoxazole-trimethoprim] Other (See Comments)     Mookie  Trell Syndrome       Tobacco Use    Smoking status: Former Smoker     Packs/day: 1.00     Years: 10.00     Pack years: 10.00     Types: Cigarettes     Start date: 2/19/2017    Smokeless tobacco: Never Used   Substance and Sexual Activity    Alcohol use: No     Comment: a fifth of liquor daily for years, cut back over last 2 months    Drug use: No    Sexual activity: Not on file     Comment: single         (Not in a hospital admission)    Objective:     Vital Signs (Most Recent):  Temp: 98.2 °F (36.8 °C) (11/23/19 1000)  Pulse: 98 (11/23/19 1000)  Resp: 18 (11/23/19 1000)  BP: (!) 168/92 (11/23/19 1000)  SpO2: 99 % (11/23/19 1000) Vital Signs (24h Range):  Temp:  [98.2 °F (36.8 °C)-99.4 °F (37.4 °C)] 98.2 °F (36.8 °C)  Pulse:  [] 98  Resp:  [17-20] 18  SpO2:  [97 %-100 %] 99 %  BP: (136-178)/(79-96) 168/92     Weight: 63.5 kg (139 lb 15.9 oz) (11/22/19 2106)  Body mass index is 22.6 kg/m².    Physical Exam   Constitutional: He is oriented to person, place, and time. No distress.   Appears comfortable, sitting up against the wall in hallway. Accompanied by mother.   HENT:   Head: Normocephalic and atraumatic.   Mouth/Throat: Oropharynx is clear and moist. No oropharyngeal exudate.   Eyes: Conjunctivae are normal. No scleral icterus.   Neck: No JVD present.   Cardiovascular: Normal rate, regular rhythm, normal heart sounds and intact distal pulses.   Pulmonary/Chest: Effort normal and breath sounds normal. No respiratory distress.   Abdominal: Soft. Bowel sounds are normal. He exhibits no distension and no mass. There is tenderness. There is rebound. There is no guarding.   Soft abdomen, non - distended. Tenderness diffuse, but predominantly RUQ. No guarding. Some rebound tenderness.   Musculoskeletal: He exhibits no edema or tenderness.   Lymphadenopathy:     He has no cervical adenopathy.   Neurological: He is alert and oriented to person, place, and time.   Skin: Skin is warm. Capillary refill takes  less than 2 seconds. He is not diaphoretic.   Psychiatric: He has a normal mood and affect. His behavior is normal. Judgment and thought content normal.   Nursing note and vitals reviewed.      MELD-Na score: 15 at 11/23/2019  4:58 AM  MELD score: 14 at 11/23/2019  4:58 AM  Calculated from:  Serum Creatinine: 2.1 mg/dL at 11/23/2019  4:58 AM  Serum Sodium: 136 mmol/L at 11/23/2019  4:58 AM  Total Bilirubin: 0.7 mg/dL (Rounded to 1 mg/dL) at 11/23/2019  4:58 AM  INR(ratio): 0.9 (Rounded to 1) at 11/23/2019  4:58 AM  Age: 29 years    Significant Labs:  Labs within the past month have been reviewed.    Significant Imaging:  Labs: Reviewed

## 2019-11-23 NOTE — HPI
"30 yo male patient with a history of HTN and Liver transplant performed 10/19/17 secondary to acute alcoholic hepatitis with multiple post-op complications including biliary stenosis s/p multiple ERCPs, adhesions, abdominal infections and prolonged LAURA requiring dialysis, who presents today as a transfer from [place], [place] due to R sided abdominal pain and fever.  His symptoms began around 11/11 with abdominal pain located on the right side, described as a colicky-type of pain of variable intensity, ranging from 3/10 to 9/10 accompanied by fever, poor appetite, nausea and 1 episode of vomiting. For this reason he presented to [facility] on 11/18/19 where per transfer note  he was hypertensive and tachycardic. He was started on empiric zosyn.  Imaging CT non-contrast and MRI abdomen demonstrated signs of enteritis(mucosal thickening) small right pleural effusion, splenomegaly.  An U/S abdomen with comment of "possible cirrhosis". He also developed a drop in h/h needing 2 PRBC transfusions along with mild stable Cr elevation (1.8-2.1). The patient was supposed to be transferred but due to insurance problems he left [place] and was driven here by his mother.  In the ER his Tmax was 99.4 HR 88 /83 Sats 98% RA. Currently he complains of pain 6/10 located to the right side that travels to his right flank. Denies any changes in his bowel habits, no constipation or diarrhea. Denies having a paracentesis at OSH.   " 1

## 2019-11-24 PROBLEM — D63.8 ANEMIA OF CHRONIC DISEASE: Status: ACTIVE | Noted: 2019-11-23

## 2019-11-24 PROBLEM — N17.9 AKI (ACUTE KIDNEY INJURY): Status: ACTIVE | Noted: 2019-11-24

## 2019-11-24 LAB
ALBUMIN SERPL BCP-MCNC: 2.2 G/DL (ref 3.5–5.2)
ALP SERPL-CCNC: 188 U/L (ref 55–135)
ALT SERPL W/O P-5'-P-CCNC: 10 U/L (ref 10–44)
ANION GAP SERPL CALC-SCNC: 11 MMOL/L (ref 8–16)
AST SERPL-CCNC: 20 U/L (ref 10–40)
BASOPHILS # BLD AUTO: 0.01 K/UL (ref 0–0.2)
BASOPHILS # BLD AUTO: 0.02 K/UL (ref 0–0.2)
BASOPHILS NFR BLD: 0.2 % (ref 0–1.9)
BASOPHILS NFR BLD: 0.3 % (ref 0–1.9)
BILIRUB SERPL-MCNC: 0.6 MG/DL (ref 0.1–1)
BUN SERPL-MCNC: 12 MG/DL (ref 6–20)
CALCIUM SERPL-MCNC: 8.4 MG/DL (ref 8.7–10.5)
CHLORIDE SERPL-SCNC: 108 MMOL/L (ref 95–110)
CO2 SERPL-SCNC: 17 MMOL/L (ref 23–29)
CREAT SERPL-MCNC: 2.2 MG/DL (ref 0.5–1.4)
DIFFERENTIAL METHOD: ABNORMAL
DIFFERENTIAL METHOD: ABNORMAL
EOSINOPHIL # BLD AUTO: 0.3 K/UL (ref 0–0.5)
EOSINOPHIL # BLD AUTO: 0.3 K/UL (ref 0–0.5)
EOSINOPHIL NFR BLD: 4.4 % (ref 0–8)
EOSINOPHIL NFR BLD: 4.5 % (ref 0–8)
ERYTHROCYTE [DISTWIDTH] IN BLOOD BY AUTOMATED COUNT: 14.2 % (ref 11.5–14.5)
ERYTHROCYTE [DISTWIDTH] IN BLOOD BY AUTOMATED COUNT: 14.3 % (ref 11.5–14.5)
EST. GFR  (AFRICAN AMERICAN): 44.8 ML/MIN/1.73 M^2
EST. GFR  (NON AFRICAN AMERICAN): 38.8 ML/MIN/1.73 M^2
FERRITIN SERPL-MCNC: 1102 NG/ML (ref 20–300)
FOLATE SERPL-MCNC: 3.9 NG/ML (ref 4–24)
GLUCOSE SERPL-MCNC: 95 MG/DL (ref 70–110)
HCT VFR BLD AUTO: 25.1 % (ref 40–54)
HCT VFR BLD AUTO: 25.5 % (ref 40–54)
HGB BLD-MCNC: 7.8 G/DL (ref 14–18)
HGB BLD-MCNC: 7.9 G/DL (ref 14–18)
IMM GRANULOCYTES # BLD AUTO: 0.03 K/UL (ref 0–0.04)
IMM GRANULOCYTES # BLD AUTO: 0.05 K/UL (ref 0–0.04)
IMM GRANULOCYTES NFR BLD AUTO: 0.5 % (ref 0–0.5)
IMM GRANULOCYTES NFR BLD AUTO: 0.9 % (ref 0–0.5)
IRON SERPL-MCNC: <10 UG/DL (ref 45–160)
LYMPHOCYTES # BLD AUTO: 1.2 K/UL (ref 1–4.8)
LYMPHOCYTES # BLD AUTO: 1.3 K/UL (ref 1–4.8)
LYMPHOCYTES NFR BLD: 20 % (ref 18–48)
LYMPHOCYTES NFR BLD: 22.1 % (ref 18–48)
MCH RBC QN AUTO: 26.2 PG (ref 27–31)
MCH RBC QN AUTO: 26.8 PG (ref 27–31)
MCHC RBC AUTO-ENTMCNC: 31 G/DL (ref 32–36)
MCHC RBC AUTO-ENTMCNC: 31.1 G/DL (ref 32–36)
MCV RBC AUTO: 85 FL (ref 82–98)
MCV RBC AUTO: 86 FL (ref 82–98)
MONOCYTES # BLD AUTO: 0.6 K/UL (ref 0.3–1)
MONOCYTES # BLD AUTO: 0.7 K/UL (ref 0.3–1)
MONOCYTES NFR BLD: 10.4 % (ref 4–15)
MONOCYTES NFR BLD: 11.1 % (ref 4–15)
NEUTROPHILS # BLD AUTO: 3.6 K/UL (ref 1.8–7.7)
NEUTROPHILS # BLD AUTO: 3.9 K/UL (ref 1.8–7.7)
NEUTROPHILS NFR BLD: 61.3 % (ref 38–73)
NEUTROPHILS NFR BLD: 64.3 % (ref 38–73)
NRBC BLD-RTO: 0 /100 WBC
NRBC BLD-RTO: 0 /100 WBC
PLATELET # BLD AUTO: 127 K/UL (ref 150–350)
PLATELET # BLD AUTO: 131 K/UL (ref 150–350)
PMV BLD AUTO: 9.3 FL (ref 9.2–12.9)
PMV BLD AUTO: 9.6 FL (ref 9.2–12.9)
POTASSIUM SERPL-SCNC: 3.6 MMOL/L (ref 3.5–5.1)
PROT SERPL-MCNC: 7.1 G/DL (ref 6–8.4)
RBC # BLD AUTO: 2.91 M/UL (ref 4.6–6.2)
RBC # BLD AUTO: 3.01 M/UL (ref 4.6–6.2)
SATURATED IRON: ABNORMAL % (ref 20–50)
SIROLIMUS BLD-MCNC: 8 NG/ML (ref 4–20)
SODIUM SERPL-SCNC: 136 MMOL/L (ref 136–145)
TOTAL IRON BINDING CAPACITY: 197 UG/DL (ref 250–450)
TRANSFERRIN SERPL-MCNC: 133 MG/DL (ref 200–375)
VIT B12 SERPL-MCNC: 358 PG/ML (ref 210–950)
WBC # BLD AUTO: 5.87 K/UL (ref 3.9–12.7)
WBC # BLD AUTO: 6.06 K/UL (ref 3.9–12.7)

## 2019-11-24 PROCEDURE — 82746 ASSAY OF FOLIC ACID SERUM: CPT

## 2019-11-24 PROCEDURE — 63600175 PHARM REV CODE 636 W HCPCS: Performed by: INTERNAL MEDICINE

## 2019-11-24 PROCEDURE — 80053 COMPREHEN METABOLIC PANEL: CPT

## 2019-11-24 PROCEDURE — 25000003 PHARM REV CODE 250: Performed by: STUDENT IN AN ORGANIZED HEALTH CARE EDUCATION/TRAINING PROGRAM

## 2019-11-24 PROCEDURE — 25000003 PHARM REV CODE 250: Performed by: INTERNAL MEDICINE

## 2019-11-24 PROCEDURE — 82728 ASSAY OF FERRITIN: CPT

## 2019-11-24 PROCEDURE — 99233 SBSQ HOSP IP/OBS HIGH 50: CPT | Mod: ,,, | Performed by: INTERNAL MEDICINE

## 2019-11-24 PROCEDURE — 11000001 HC ACUTE MED/SURG PRIVATE ROOM

## 2019-11-24 PROCEDURE — 63600175 PHARM REV CODE 636 W HCPCS: Performed by: STUDENT IN AN ORGANIZED HEALTH CARE EDUCATION/TRAINING PROGRAM

## 2019-11-24 PROCEDURE — 99233 SBSQ HOSP IP/OBS HIGH 50: CPT | Mod: ,,, | Performed by: HOSPITALIST

## 2019-11-24 PROCEDURE — 87799 DETECT AGENT NOS DNA QUANT: CPT

## 2019-11-24 PROCEDURE — 83540 ASSAY OF IRON: CPT

## 2019-11-24 PROCEDURE — 85025 COMPLETE CBC W/AUTO DIFF WBC: CPT

## 2019-11-24 PROCEDURE — 99233 PR SUBSEQUENT HOSPITAL CARE,LEVL III: ICD-10-PCS | Mod: ,,, | Performed by: INTERNAL MEDICINE

## 2019-11-24 PROCEDURE — 82607 VITAMIN B-12: CPT

## 2019-11-24 PROCEDURE — 99233 PR SUBSEQUENT HOSPITAL CARE,LEVL III: ICD-10-PCS | Mod: ,,, | Performed by: HOSPITALIST

## 2019-11-24 PROCEDURE — 80195 ASSAY OF SIROLIMUS: CPT

## 2019-11-24 PROCEDURE — 36415 COLL VENOUS BLD VENIPUNCTURE: CPT

## 2019-11-24 PROCEDURE — 80321 ALCOHOLS BIOMARKERS 1OR 2: CPT

## 2019-11-24 RX ORDER — SIROLIMUS 1 MG/1
1 TABLET, FILM COATED ORAL DAILY
Status: DISCONTINUED | OUTPATIENT
Start: 2019-11-24 | End: 2019-11-26

## 2019-11-24 RX ORDER — FOLIC ACID 1 MG/1
1 TABLET ORAL DAILY
Status: DISCONTINUED | OUTPATIENT
Start: 2019-11-24 | End: 2019-11-30 | Stop reason: HOSPADM

## 2019-11-24 RX ORDER — BISACODYL 5 MG
5 TABLET, DELAYED RELEASE (ENTERIC COATED) ORAL DAILY PRN
COMMUNITY
End: 2021-09-22

## 2019-11-24 RX ORDER — SIROLIMUS 1 MG/1
1 TABLET, FILM COATED ORAL DAILY
Status: DISCONTINUED | OUTPATIENT
Start: 2019-11-25 | End: 2019-11-24

## 2019-11-24 RX ORDER — POLYETHYLENE GLYCOL 3350 17 G/17G
17 POWDER, FOR SOLUTION ORAL ONCE AS NEEDED
Status: COMPLETED | OUTPATIENT
Start: 2019-11-24 | End: 2019-11-24

## 2019-11-24 RX ORDER — ACETAMINOPHEN 325 MG/1
650 TABLET ORAL ONCE
Status: COMPLETED | OUTPATIENT
Start: 2019-11-24 | End: 2019-11-24

## 2019-11-24 RX ORDER — FAMOTIDINE 10 MG/1
10 TABLET ORAL DAILY PRN
Status: ON HOLD | COMMUNITY
End: 2019-11-30 | Stop reason: HOSPADM

## 2019-11-24 RX ADMIN — PROMETHAZINE HYDROCHLORIDE 12.5 MG: 25 INJECTION INTRAMUSCULAR; INTRAVENOUS at 12:11

## 2019-11-24 RX ADMIN — HYDROCODONE BITARTRATE AND ACETAMINOPHEN 1 TABLET: 10; 325 TABLET ORAL at 10:11

## 2019-11-24 RX ADMIN — HYDROCODONE BITARTRATE AND ACETAMINOPHEN 1 TABLET: 10; 325 TABLET ORAL at 01:11

## 2019-11-24 RX ADMIN — HYDROMORPHONE HYDROCHLORIDE 0.5 MG: 1 INJECTION, SOLUTION INTRAMUSCULAR; INTRAVENOUS; SUBCUTANEOUS at 07:11

## 2019-11-24 RX ADMIN — HYDROCODONE BITARTRATE AND ACETAMINOPHEN 1 TABLET: 10; 325 TABLET ORAL at 02:11

## 2019-11-24 RX ADMIN — PIPERACILLIN AND TAZOBACTAM 4.5 G: 4; .5 INJECTION, POWDER, FOR SOLUTION INTRAVENOUS at 02:11

## 2019-11-24 RX ADMIN — POLYETHYLENE GLYCOL 3350 17 G: 17 POWDER, FOR SOLUTION ORAL at 09:11

## 2019-11-24 RX ADMIN — ENOXAPARIN SODIUM 40 MG: 100 INJECTION SUBCUTANEOUS at 06:11

## 2019-11-24 RX ADMIN — HYDROCODONE BITARTRATE AND ACETAMINOPHEN 1 TABLET: 10; 325 TABLET ORAL at 06:11

## 2019-11-24 RX ADMIN — POLYETHYLENE GLYCOL 3350 17 G: 17 POWDER, FOR SOLUTION ORAL at 10:11

## 2019-11-24 RX ADMIN — PANTOPRAZOLE SODIUM 40 MG: 40 TABLET, DELAYED RELEASE ORAL at 09:11

## 2019-11-24 RX ADMIN — FOLIC ACID 1 MG: 1 TABLET ORAL at 03:11

## 2019-11-24 RX ADMIN — HYDROMORPHONE HYDROCHLORIDE 0.5 MG: 1 INJECTION, SOLUTION INTRAMUSCULAR; INTRAVENOUS; SUBCUTANEOUS at 12:11

## 2019-11-24 RX ADMIN — ACETAMINOPHEN 650 MG: 325 TABLET ORAL at 01:11

## 2019-11-24 RX ADMIN — HYDROCODONE BITARTRATE AND ACETAMINOPHEN 1 TABLET: 10; 325 TABLET ORAL at 05:11

## 2019-11-24 RX ADMIN — PIPERACILLIN AND TAZOBACTAM 4.5 G: 4; .5 INJECTION, POWDER, FOR SOLUTION INTRAVENOUS at 07:11

## 2019-11-24 RX ADMIN — SIROLIMUS 1 MG: 1 TABLET ORAL at 03:11

## 2019-11-24 RX ADMIN — PIPERACILLIN AND TAZOBACTAM 4.5 G: 4; .5 INJECTION, POWDER, FOR SOLUTION INTRAVENOUS at 12:11

## 2019-11-24 NOTE — ASSESSMENT & PLAN NOTE
30 yo male with Liver transplant in 2017 secondary to alcoholic hepatitis and multiple post-op complications including biliary duct stricture s/p multiple ERCPs w/stent placement, infections and prolonged LAURA requiring dialysis, coming for severe right sided abdominal pain with fever. Per notes, imaging from OSH CT non-contrast and MRI abdomen demonstrate signs of enteritis(mucosal thickening) small right pleural effusion and splenomegaly.

## 2019-11-24 NOTE — PROGRESS NOTES
[facility]  Infectious Disease  Progress Note    Patient Name: Jhonny Diana  MRN: [id number]  Admission Date: 11/22/2019  Length of Stay: 1 days  Attending Physician: Michelle Rowe MD  Primary Care Provider: Primary Doctor No    Isolation Status: No active isolations  Assessment/Plan:      Enteritis  30yo man w/a history of asthma and alcoholic cirrhosis (c/b HE, EV, portal HTN, and HRS; s/p DDLT 10/19/2017, CMV D+/R+, steroid induction, on maintenance sirolimus; c/b seizures, LAURA, superificial wound infection s/p wound vac closure, culture negative peritonitis due to suspected indolent biliary leakage s/p repeated sphincterotomy/biliary stent placement, subsequent loculated RLQ collection s/p perc-drainage and prolonged empiric antibiotics course, gastritis/duodenal ulcer 3/6/2018, and 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 He remains tenuous -- differential most notable for CMV, community acquired viral infection moreso than acute bacterial illness.    - will review images with radiology when available in house (specifically MRI) -- may continue zosyn for now til review is complete  - await pending CMV quant and given fevers with convincing associated clinical illness, will start induction GCV (2.5mg/kg IV q24h for current CrCl)  - will f/u pending cultures in house        Anticipated Disposition: pending improvement    Thank you for your consult. I will follow-up with patient. Please contact us if you have any additional questions.     Melanie Bergman MD  Transplant ID Attending  [id number]    Melanie Bergman MD  Infectious Disease  [facility]    Subjective:     Principal Problem:Enteritis of infectious origin    HPI: No notes on file  Interval History: Febrile overnight without new symptoms. Cultures negative.    Review of Systems   Constitutional: Positive for fever. Negative for activity change, appetite change, chills, diaphoresis, fatigue and unexpected weight change.   HENT: Negative for sore throat and trouble swallowing.    Eyes: Negative for visual disturbance.   Respiratory: Negative for chest tightness and shortness of breath.    Cardiovascular: Negative for chest pain and leg swelling.   Gastrointestinal: Positive for abdominal pain and nausea. Negative for abdominal distention, anal bleeding, blood in stool, constipation, diarrhea and vomiting.   Genitourinary: Negative for dysuria and hematuria.   Musculoskeletal: Negative for arthralgias and myalgias.   Skin: Negative for rash.   Neurological: Negative for dizziness, weakness, light-headedness and headaches.   Psychiatric/Behavioral: Negative for agitation and confusion.     Objective:     Vital Signs (Most Recent):  Temp: 99.7 °F (37.6 °C) (11/24/19 0750)  Pulse: 89 (11/24/19 0750)  Resp: 18 (11/24/19 0750)  BP: 134/81 (11/24/19 0750)  SpO2: 98 % (11/24/19 0750) Vital Signs (24h Range):  Temp:  [98.9 °F (37.2 °C)-101.1 °F (38.4 °C)] 99.7 °F (37.6 °C)  Pulse:  [89-98] 89  Resp:  [14-18] 18  SpO2:  [97 %-98 %] 98 %  BP: (134-155)/(71-91) 134/81     Weight: 61.7 kg (136 lb 2.1 oz)  Body mass index is 21.97 kg/m².    Estimated Creatinine Clearance: 42.9 mL/min (A) (based on SCr of 2.2 mg/dL (H)).    Physical Exam   Constitutional: He is oriented to person, place, and time. No distress.   Appears comfortable, sitting up against the wall in hallway. Accompanied by mother.   HENT:   Head: Normocephalic and atraumatic.   Mouth/Throat: Oropharynx is clear and moist. No oropharyngeal exudate.   Eyes: Conjunctivae are normal. No scleral icterus.   Neck: No JVD present.    Cardiovascular: Normal rate, regular rhythm, normal heart sounds and intact distal pulses.   Pulmonary/Chest: Effort normal and breath sounds normal. No respiratory distress.   Abdominal: Soft. Bowel sounds are normal. He exhibits no distension and no mass. There is tenderness. There is rebound. There is no guarding.   Soft abdomen, non - distended. Tenderness diffuse, but predominantly RUQ. No guarding. Some rebound tenderness.   Musculoskeletal: He exhibits no edema or tenderness.   Lymphadenopathy:     He has no cervical adenopathy.   Neurological: He is alert and oriented to person, place, and time.   Skin: Skin is warm. Capillary refill takes less than 2 seconds. He is not diaphoretic.   Psychiatric: He has a normal mood and affect. His behavior is normal. Judgment and thought content normal.   Nursing note and vitals reviewed.      Significant Labs:   CBC:   Recent Labs   Lab 11/22/19 2149 11/23/19 0458 11/24/19  0431   WBC 5.83 5.67 5.87   HGB 9.3* 9.4* 7.8*   HCT 29.2* 28.9* 25.1*   * 183 131*     CMP:   Recent Labs   Lab 11/22/19 2149 11/23/19 0458 11/24/19  0431    136 136   K 3.8 4.2 3.6    106 108   CO2 20* 17* 17*   GLU 91 83 95   BUN 12 11 12   CREATININE 2.3* 2.1* 2.2*   CALCIUM 8.7 9.2 8.4*   PROT 7.9 8.8* 7.1   ALBUMIN 2.4* 2.6* 2.2*   BILITOT 0.7 0.7 0.6   ALKPHOS 176* 201* 188*   AST 19 26 20   ALT 12 11 10   ANIONGAP 9 13 11   EGFRNONAA 36.7* 41.0* 38.8*       Significant Imaging: I have reviewed all pertinent imaging results/findings within the past 24 hours.

## 2019-11-24 NOTE — PLAN OF CARE
Plan of care reviewed with patient. Vital signs are within normal limits, and he has not experienced a change in LOC. He has been educated on his risk for falls. At the time of this writing, no fall with injury has occurred. Bed down in lowest position, call light within reach, side rails upx2

## 2019-11-24 NOTE — ASSESSMENT & PLAN NOTE
Per chart review, Hb had been stable in the 10.5-12 range for the past months. H/H dropped at OSH with no obvious source of bleeding and needed 2 tranfusions.  - Although reliability will no be optimal since he was already transfused.   Claudia, LDH, T bili wnl. Haptoglobin elevated likely due to inflammatory process  Iron studies consistent with anemia of chronic disease.  Transfuse if Hgb <7

## 2019-11-24 NOTE — SUBJECTIVE & OBJECTIVE
Interval History: Spiked fevers overnight, Tmax 101.1. Continues to experience abdominal pain, but controlled with current pain regimen. Denies cough, SOB, dysuria or diarrhea.     Review of Systems   Constitutional: Positive for fever. Negative for chills.   HENT: Negative for congestion and sneezing.    Respiratory: Negative for cough, chest tightness, shortness of breath and wheezing.    Cardiovascular: Negative for chest pain, palpitations and leg swelling.   Gastrointestinal: Positive for abdominal pain.   Genitourinary: Negative for difficulty urinating, dysuria and hematuria.   Musculoskeletal: Negative for back pain and myalgias.   Neurological: Positive for light-headedness. Negative for dizziness, tremors and syncope.   Psychiatric/Behavioral: Negative for agitation, behavioral problems and confusion.     Objective:     Vital Signs (Most Recent):  Temp: 99.8 °F (37.7 °C) (11/24/19 1516)  Pulse: 96 (11/24/19 1250)  Resp: 18 (11/24/19 1250)  BP: (!) 145/86 (11/24/19 1250)  SpO2: 97 % (11/24/19 1250) Vital Signs (24h Range):  Temp:  [98.9 °F (37.2 °C)-101.5 °F (38.6 °C)] 99.8 °F (37.7 °C)  Pulse:  [89-98] 96  Resp:  [16-18] 18  SpO2:  [97 %-98 %] 97 %  BP: (134-155)/(71-91) 145/86     Weight: 61.7 kg (136 lb 2.1 oz)  Body mass index is 21.97 kg/m².    Intake/Output Summary (Last 24 hours) at 11/24/2019 1623  Last data filed at 11/23/2019 1838  Gross per 24 hour   Intake 250 ml   Output --   Net 250 ml      Physical Exam   Constitutional: He is oriented to person, place, and time. He appears well-developed and well-nourished.   HENT:   Head: Normocephalic and atraumatic.   Eyes: Pupils are equal, round, and reactive to light. Conjunctivae and EOM are normal.   Neck: Normal range of motion.   Cardiovascular: Normal rate, regular rhythm, normal heart sounds and intact distal pulses.   No murmur heard.  Pulmonary/Chest: Effort normal and breath sounds normal. He has no wheezes. He has no rales.   Abdominal: Soft.  Bowel sounds are normal. He exhibits no distension and no mass. There is tenderness (Diffusely tender, Mostly on RUQ). A hernia (umbilical reducible hernia noted) is present.   Musculoskeletal: Normal range of motion. He exhibits no edema or tenderness.   Neurological: He is alert and oriented to person, place, and time.   Skin: Skin is warm and dry. No rash noted. No erythema.   Psychiatric: He has a normal mood and affect. His behavior is normal. Thought content normal.       Significant Labs:   Blood Culture:   Recent Labs   Lab 11/22/19 2140 11/22/19 2155 11/23/19 1953   LABBLOO No Growth to date  No Growth to date No Growth to date  No Growth to date No Growth to date  No Growth to date     BMP:   Recent Labs   Lab 11/23/19 0458 11/24/19 0431   GLU 83 95    136   K 4.2 3.6    108   CO2 17* 17*   BUN 11 12   CREATININE 2.1* 2.2*   CALCIUM 9.2 8.4*   MG 1.8  --      CBC:   Recent Labs   Lab 11/23/19 0458 11/24/19 0431 11/24/19  1406   WBC 5.67 5.87 6.06   HGB 9.4* 7.8* 7.9*   HCT 28.9* 25.1* 25.5*    131* 127*     CMP:   Recent Labs   Lab 11/22/19 2149 11/23/19 0458 11/24/19 0431    136 136   K 3.8 4.2 3.6    106 108   CO2 20* 17* 17*   GLU 91 83 95   BUN 12 11 12   CREATININE 2.3* 2.1* 2.2*   CALCIUM 8.7 9.2 8.4*   PROT 7.9 8.8* 7.1   ALBUMIN 2.4* 2.6* 2.2*   BILITOT 0.7 0.7 0.6   ALKPHOS 176* 201* 188*   AST 19 26 20   ALT 12 11 10   ANIONGAP 9 13 11   EGFRNONAA 36.7* 41.0* 38.8*       Significant Imaging: I have reviewed all pertinent imaging results/findings within the past 24 hours.

## 2019-11-24 NOTE — TREATMENT PLAN
Hepatology Treatment Plan    Jhonny Diana is a 29 y.o. male admitted to hospital 11/22/2019 (Hospital Day: 3) due to Enteritis of infectious origin. Hepatology following for immunosuppression management.    Lab Results   Component Value Date    TACROLIMUS <1.5 (L) 03/04/2018    TACROLIMUS 1.6 (L) 03/03/2018    TACROLIMUS 3.2 (L) 03/02/2018    TACROLIMUS 3.2 (L) 03/02/2018    TACROLIMUS 3.2 (L) 03/02/2018       Lab Results   Component Value Date    CREATININE 2.2 (H) 11/24/2019    CREATININE 2.1 (H) 11/23/2019    CREATININE 2.3 (H) 11/22/2019       Lab Results   Component Value Date    ALT 10 11/24/2019    AST 20 11/24/2019     (H) 10/26/2017    ALKPHOS 188 (H) 11/24/2019    BILITOT 0.6 11/24/2019    WBC 5.87 11/24/2019    HGB 7.8 (L) 11/24/2019     (L) 11/24/2019       Kidney function is stable  Liver enzymes are stable    Plan  - Changes to immunosuppression regimen as below    Immunosuppression Regimen:  Sirolimus: 1mg daily to start today    Concern for CMV+, being started on therapy, will try to arrange an EGD tomorrow to obtain tissue  - NPO at [place]    Please notify hepatology on day of discharge to discuss discharge medications and follow up.     Please obtain daily CBC, BMP, LFT, INR, immunosuppressant trough level    Thank you for involving us in the care of Jhonny Diana. Please call with any additional concerns or questions.    William Kumar MD  Gastroenterology Fellow

## 2019-11-24 NOTE — NURSING
Patient identifiers verified and correct for Jhonny Diana.    HPI: Pt advised to come to [facility] by hospital in [place], [place]. Pt s/p liver transplant (2017) with N/V, abdominal pain x 2 weeks.     LOC: The patient is awake, alert, and oriented to place, time, situation. Affect is appropriate.  Speech is appropriate and clear.     APPEARANCE: Patient resting comfortably in no acute distress.  Patient is clean and well groomed.    SKIN: The skin is warm and dry; color consistent with ethnicity.  Patient has normal skin turgor and moist mucus membranes.  Skin intact; no breakdown. Bruising noted to previous PIV sites to BUA.    MUSCULOSKELETAL: Patient moving upper and lower extremities without difficulty. Denies weakness.     RESPIRATORY: Airway is open and patent. Respirations spontaneous, even, easy, and non-labored.  Patient has a normal effort and rate.  No accessory muscle use noted. Denies cough.     CARDIAC: Regular rate and rhythm noted.  No peripheral edema noted. No complaints of chest pain.     ABDOMEN: Tender to palpation of ASIM. Umbilical hernia, without change, noted.     NEUROLOGIC: Eyes open spontaneously.  Behavior appropriate to situation.  Follows commands; facial expression symmetrical.  Purposeful motor response noted; normal sensation in all extremities.

## 2019-11-24 NOTE — ASSESSMENT & PLAN NOTE
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, CMV D+/R+, steroid induction, on maintenance sirolimus; c/b seizures, LAURA, superificial wound infection s/p wound vac closure, culture negative peritonitis due to suspected indolent biliary leakage s/p repeated sphincterotomy/biliary stent placement, subsequent loculated RLQ collection s/p perc-drainage and prolonged empiric antibiotics course, gastritis/duodenal ulcer 3/6/2018, and CMV infection 3/2018) who was admitted on 11/22/2019 as a transfer from an OSH in [facility] (close to 2wks into admission) with low grade temps (peak 100F), N/V, and RUQ abdominal pain due to enteritis of unknown etiology (seen on MRI abdomen 11/21 at OSH; notable no significant ascites, fluid collection, or pancreatitis on that study; patient denies associated diarrhea or bloody stools). He remains tenuous -- differential most notable for CMV, community acquired viral infection moreso than acute bacterial illness.    - will review images with radiology when available in house (specifically MRI) -- may continue zosyn for now til review is complete  - await pending CMV quant and given fevers with convincing associated clinical illness, will start induction GCV (2.5mg/kg IV q24h for current CrCl)  - will f/u pending cultures in house

## 2019-11-24 NOTE — SUBJECTIVE & OBJECTIVE
Interval History: Febrile overnight without new symptoms. Cultures negative.    Review of Systems   Constitutional: Positive for fever. Negative for activity change, appetite change, chills, diaphoresis, fatigue and unexpected weight change.   HENT: Negative for sore throat and trouble swallowing.    Eyes: Negative for visual disturbance.   Respiratory: Negative for chest tightness and shortness of breath.    Cardiovascular: Negative for chest pain and leg swelling.   Gastrointestinal: Positive for abdominal pain and nausea. Negative for abdominal distention, anal bleeding, blood in stool, constipation, diarrhea and vomiting.   Genitourinary: Negative for dysuria and hematuria.   Musculoskeletal: Negative for arthralgias and myalgias.   Skin: Negative for rash.   Neurological: Negative for dizziness, weakness, light-headedness and headaches.   Psychiatric/Behavioral: Negative for agitation and confusion.     Objective:     Vital Signs (Most Recent):  Temp: 99.7 °F (37.6 °C) (11/24/19 0750)  Pulse: 89 (11/24/19 0750)  Resp: 18 (11/24/19 0750)  BP: 134/81 (11/24/19 0750)  SpO2: 98 % (11/24/19 0750) Vital Signs (24h Range):  Temp:  [98.9 °F (37.2 °C)-101.1 °F (38.4 °C)] 99.7 °F (37.6 °C)  Pulse:  [89-98] 89  Resp:  [14-18] 18  SpO2:  [97 %-98 %] 98 %  BP: (134-155)/(71-91) 134/81     Weight: 61.7 kg (136 lb 2.1 oz)  Body mass index is 21.97 kg/m².    Estimated Creatinine Clearance: 42.9 mL/min (A) (based on SCr of 2.2 mg/dL (H)).    Physical Exam   Constitutional: He is oriented to person, place, and time. No distress.   Appears comfortable, sitting up against the wall in hallway. Accompanied by mother.   HENT:   Head: Normocephalic and atraumatic.   Mouth/Throat: Oropharynx is clear and moist. No oropharyngeal exudate.   Eyes: Conjunctivae are normal. No scleral icterus.   Neck: No JVD present.   Cardiovascular: Normal rate, regular rhythm, normal heart sounds and intact distal pulses.   Pulmonary/Chest: Effort normal  and breath sounds normal. No respiratory distress.   Abdominal: Soft. Bowel sounds are normal. He exhibits no distension and no mass. There is tenderness. There is rebound. There is no guarding.   Soft abdomen, non - distended. Tenderness diffuse, but predominantly RUQ. No guarding. Some rebound tenderness.   Musculoskeletal: He exhibits no edema or tenderness.   Lymphadenopathy:     He has no cervical adenopathy.   Neurological: He is alert and oriented to person, place, and time.   Skin: Skin is warm. Capillary refill takes less than 2 seconds. He is not diaphoretic.   Psychiatric: He has a normal mood and affect. His behavior is normal. Judgment and thought content normal.   Nursing note and vitals reviewed.      Significant Labs:   CBC:   Recent Labs   Lab 11/22/19 2149 11/23/19 0458 11/24/19 0431   WBC 5.83 5.67 5.87   HGB 9.3* 9.4* 7.8*   HCT 29.2* 28.9* 25.1*   * 183 131*     CMP:   Recent Labs   Lab 11/22/19 2149 11/23/19 0458 11/24/19  0431    136 136   K 3.8 4.2 3.6    106 108   CO2 20* 17* 17*   GLU 91 83 95   BUN 12 11 12   CREATININE 2.3* 2.1* 2.2*   CALCIUM 8.7 9.2 8.4*   PROT 7.9 8.8* 7.1   ALBUMIN 2.4* 2.6* 2.2*   BILITOT 0.7 0.7 0.6   ALKPHOS 176* 201* 188*   AST 19 26 20   ALT 12 11 10   ANIONGAP 9 13 11   EGFRNONAA 36.7* 41.0* 38.8*       Significant Imaging: I have reviewed all pertinent imaging results/findings within the past 24 hours.

## 2019-11-24 NOTE — PROGRESS NOTES
"[facility] Hospital Medicine  Progress Note    Patient Name: Jhonny Diana  MRN: [id number]  Patient Class: IP- Inpatient   Admission Date: 11/22/2019  Length of Stay: 1 days  Attending Physician: Michelle Rowe MD  Primary Care Provider: [facility] Medicine Team: [facility] HOSP MED 1 Bucky Jose MD    Subjective:     Principal Problem:Enteritis of infectious origin        HPI:  30 yo male patient with a history of HTN and Liver transplant performed 10/19/17 secondary to acute alcoholic hepatitis with multiple post-op complications including biliary stenosis s/p multiple ERCPs, adhesions, abdominal infections and prolonged LAURA requiring dialysis, who presents today as a transfer from [place], [place] due to R sided abdominal pain and fever.  His symptoms began around 11/11 with abdominal pain located on the right side, described as a colicky-type of pain of variable intensity, ranging from 3/10 to 9/10 accompanied by fever, poor appetite, nausea and 1 episode of vomiting. For this reason he presented to [facility] on 11/18/19 where per transfer note  he was hypertensive and tachycardic. He was started on empiric zosyn.  Imaging CT non-contrast and MRI abdomen demonstrated signs of enteritis(mucosal thickening) small right pleural effusion, splenomegaly.  An U/S abdomen with comment of "possible cirrhosis". He also developed a drop in h/h needing 2 PRBC transfusions along with mild stable Cr elevation (1.8-2.1). The patient was supposed to be transferred but due to insurance problems he left [place] and was driven here by his mother.  In the ER his Tmax was 99.4 HR 88 /83 Sats 98% RA. Currently he complains of pain 6/10 located to the right side that travels to his right flank. Denies any changes in his bowel habits, no constipation or diarrhea. Denies having a paracentesis at OSH.     Overview/Hospital Course:  Patient was admitted for abdominal pain. MRI Abd w/o contrast at " OSH showed changes consistent with enteritis. ID and hepatology consulted. Patient was started on zosyn on admission. CMV pending. Started on ganciclovir with plan to undergo EGD tomorrow with biopsy.     Interval History: Spiked fevers overnight, Tmax 101.1. Continues to experience abdominal pain, but controlled with current pain regimen. Denies cough, SOB, dysuria or diarrhea.     Review of Systems   Constitutional: Positive for fever. Negative for chills.   HENT: Negative for congestion and sneezing.    Respiratory: Negative for cough, chest tightness, shortness of breath and wheezing.    Cardiovascular: Negative for chest pain, palpitations and leg swelling.   Gastrointestinal: Positive for abdominal pain.   Genitourinary: Negative for difficulty urinating, dysuria and hematuria.   Musculoskeletal: Negative for back pain and myalgias.   Neurological: Positive for light-headedness. Negative for dizziness, tremors and syncope.   Psychiatric/Behavioral: Negative for agitation, behavioral problems and confusion.     Objective:     Vital Signs (Most Recent):  Temp: 99.8 °F (37.7 °C) (11/24/19 1516)  Pulse: 96 (11/24/19 1250)  Resp: 18 (11/24/19 1250)  BP: (!) 145/86 (11/24/19 1250)  SpO2: 97 % (11/24/19 1250) Vital Signs (24h Range):  Temp:  [98.9 °F (37.2 °C)-101.5 °F (38.6 °C)] 99.8 °F (37.7 °C)  Pulse:  [89-98] 96  Resp:  [16-18] 18  SpO2:  [97 %-98 %] 97 %  BP: (134-155)/(71-91) 145/86     Weight: 61.7 kg (136 lb 2.1 oz)  Body mass index is 21.97 kg/m².    Intake/Output Summary (Last 24 hours) at 11/24/2019 1623  Last data filed at 11/23/2019 1838  Gross per 24 hour   Intake 250 ml   Output --   Net 250 ml      Physical Exam   Constitutional: He is oriented to person, place, and time. He appears well-developed and well-nourished.   HENT:   Head: Normocephalic and atraumatic.   Eyes: Pupils are equal, round, and reactive to light. Conjunctivae and EOM are normal.   Neck: Normal range of motion.   Cardiovascular:  Normal rate, regular rhythm, normal heart sounds and intact distal pulses.   No murmur heard.  Pulmonary/Chest: Effort normal and breath sounds normal. He has no wheezes. He has no rales.   Abdominal: Soft. Bowel sounds are normal. He exhibits no distension and no mass. There is tenderness (Diffusely tender, Mostly on RUQ). A hernia (umbilical reducible hernia noted) is present.   Musculoskeletal: Normal range of motion. He exhibits no edema or tenderness.   Neurological: He is alert and oriented to person, place, and time.   Skin: Skin is warm and dry. No rash noted. No erythema.   Psychiatric: He has a normal mood and affect. His behavior is normal. Thought content normal.       Significant Labs:   Blood Culture:   Recent Labs   Lab 11/22/19 2140 11/22/19 2155 11/23/19 1953   LABBLOO No Growth to date  No Growth to date No Growth to date  No Growth to date No Growth to date  No Growth to date     BMP:   Recent Labs   Lab 11/23/19 0458 11/24/19 0431   GLU 83 95    136   K 4.2 3.6    108   CO2 17* 17*   BUN 11 12   CREATININE 2.1* 2.2*   CALCIUM 9.2 8.4*   MG 1.8  --      CBC:   Recent Labs   Lab 11/23/19 0458 11/24/19 0431 11/24/19  1406   WBC 5.67 5.87 6.06   HGB 9.4* 7.8* 7.9*   HCT 28.9* 25.1* 25.5*    131* 127*     CMP:   Recent Labs   Lab 11/22/19 2149 11/23/19 0458 11/24/19 0431    136 136   K 3.8 4.2 3.6    106 108   CO2 20* 17* 17*   GLU 91 83 95   BUN 12 11 12   CREATININE 2.3* 2.1* 2.2*   CALCIUM 8.7 9.2 8.4*   PROT 7.9 8.8* 7.1   ALBUMIN 2.4* 2.6* 2.2*   BILITOT 0.7 0.7 0.6   ALKPHOS 176* 201* 188*   AST 19 26 20   ALT 12 11 10   ANIONGAP 9 13 11   EGFRNONAA 36.7* 41.0* 38.8*       Significant Imaging: I have reviewed all pertinent imaging results/findings within the past 24 hours.      Assessment/Plan:      * Enteritis of infectious origin  Patient presented with abdominal pain suspicious for CMV colitis. MRI of the abdomen w/o contrast at OSH shows mild small  bowel mucosa thickening  consistent with enteritis. Suspicious for CMV in this immunosuppressed patient   Awaiting CMV studies  ID consulted- recommended to continue with zosyn, started on ganciclovir  -NPO at midnight for EGD w/ biopsy to obtain tissue          Anemia of chronic disease  Per chart review, Hb had been stable in the 10.5-12 range for the past months. H/H dropped at OSH with no obvious source of bleeding and needed 2 tranfusions.  - Although reliability will no be optimal since he was already transfused.   Claudia, LDH, T bili wnl. Haptoglobin elevated likely due to inflammatory process  Iron studies consistent with anemia of chronic disease.  Transfuse if Hgb <7      Right upper quadrant abdominal pain  28 yo male with Liver transplant in 2017 secondary to alcoholic hepatitis and multiple post-op complications including biliary duct stricture s/p multiple ERCPs w/stent placement, infections and prolonged LAURA requiring dialysis, coming for severe right sided abdominal pain with fever. Per notes, imaging from OSH CT non-contrast and MRI abdomen demonstrate signs of enteritis(mucosal thickening) small right pleural effusion and splenomegaly.          Weakness        Enteritis        Umbilical hernia  Reducible. 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 Needed multiple sessions of dialysis and finally slowly recovered. Last sCr 1.4 in 07/2019. sCr at 2.3 on admission, LAURA from current abdominal pathology.  sCr currently at 2.2      Thrombocytopenia  Pt does have a history of thrombocytopenia and underwent bone marrow biopsy in 2018 thought to be from meds vs peripheral destruction. Currently at 148.     Alcohol use disorder, severe, in early remission  Patient states he has not taken alcohol recently.    Long-term use of immunosuppressant  medication  Per records patient was on cyclosporine and due to concern for TMA he was switched to rapamune in 05/2018    -Hepatology following  -Sirolimus 1mg per hepatology recs        S/P liver transplant  Performed in 10/19/19 secondary to alcoholic hepatitis. LFTs not concerning for rejection.      VTE Risk Mitigation (From admission, onward)         Ordered     enoxaparin injection 40 mg  Daily      11/23/19 0300                      Bucky Jose MD  Department of Hospital Medicine   [facility]

## 2019-11-24 NOTE — HOSPITAL COURSE
Patient was admitted for abdominal pain. MRI Abd w/o contrast at OSH showed changes consistent with enteritis. ID and hepatology consulted. Patient was started on zosyn on admission. CMV pending. Started on ganciclovir with plan to undergo EGD performed and bcx was done. CMV was negative. Awaiting CMV biopsies. Patient complained of worsening pain and had a fever spike, so zosyn was restarted, CT Abd showed significant constipation in colon. Patient was started on laxatives to facilitate bowel movements. Gen surg was consulted for concerns of adhesions causing bowel obstruction or worsening abd pain given hx of abd surgeries and adhesions. They expressed no concern for bowel obstrutcion per their imaging read and review of the case. US Mesenteric Ischemia was limited due to excess abdominal gas. Patient continued to receive laxatives. Giardia was positive on stool assay, ganciclovir was d/c and metronidazole was started. EBV <100. Environmental history and sexual history was taken from the patient and patient has a male sexual partner, who is also present at the bedside. Explain Giardia can be transmitted fecal oral route. Patient discharged home with 6 additional days course of Flagyl and partner sent out with 1 day treatment of Tinidazole. Hepatology wanted him to be on .5mg sirolimus up on discharge.

## 2019-11-24 NOTE — ASSESSMENT & PLAN NOTE
Patient presented with abdominal pain suspicious for CMV colitis. MRI of the abdomen w/o contrast at OSH shows mild small bowel mucosa thickening  consistent with enteritis. Suspicious for CMV in this immunosuppressed patient   Awaiting CMV studies  ID consulted- recommended to continue with zosyn, started on ganciclovir  -NPO at midnight for EGD w/ biopsy to obtain tissue

## 2019-11-24 NOTE — ASSESSMENT & PLAN NOTE
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, CMV D+/R+, steroid induction, on maintenance sirolimus; c/b seizures, LAURA, superificial wound infection s/p wound vac closure, culture negative peritonitis due to suspected indolent biliary leakage s/p repeated sphincterotomy/biliary stent placement, subsequent loculated RLQ collection s/p perc-drainage and prolonged empiric antibiotics course, gastritis/duodenal ulcer 3/6/2018, and CMV infection 3/2018) who was admitted on 11/22/2019 as a transfer from an OSH in [facility] (close to 2wks into admission) with low grade temps (peak 100F), N/V, and RUQ abdominal pain due to enteritis of unknown etiology (seen on MRI abdomen 11/21 at OSH; notable no significant ascites, fluid collection, or pancreatitis on that study; patient denies associated diarrhea or bloody stools). He remains tenuous -- differential most notable for CMV, community acquired viral infection moreso than acute bacterial illness.    - will review images with radiology when available in house (specifically MRI) -- may continue zosyn for now til review is complete  - await pending CMV quant and if symptoms persist with negative quant, may need endoscopy and biopsy early next week  - will f/u pending cultures in house

## 2019-11-24 NOTE — HPI
Mr. 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  He was admitted at an outside hospital for right sided abdominal pain and subjective fevers.  MRI abdomen at the outside hospital apparently showed enteritis.  He was transferred to [facility] for a higher level of care.  We are consulted to assist with his care.  Stool ova and parasites are positive for Giardia lamblia.

## 2019-11-24 NOTE — ASSESSMENT & PLAN NOTE
Per records patient was on cyclosporine and due to concern for TMA he was switched to rapamune in 05/2018    -Hepatology following  -Sirolimus 1mg per hepatology recs

## 2019-11-24 NOTE — PHARMACY MED REC
"Admission Medication Reconciliation - Pharmacy Consult Note    The home medication history was taken by Mandy Leonardo, Pharmacy Technician.  Based on information gathered and subsequent review by the clinical pharmacist, the items below may need attention.    You may go to "Admission" then "Reconcile Home Medications" tabs to review and/or act upon these items.      No issues noted with the medication reconciliation.      Please address this information as you see fit.  Feel free to contact us if you have any questions or require assistance.    Khadijah Melton, ErenD  [id number]                  .    .            "

## 2019-11-24 NOTE — SUBJECTIVE & OBJECTIVE
Past Medical History:   Diagnosis Date    Abdominal pain 5/27/2018    Alcoholic cirrhosis 8/14/2018    Alcoholic hepatitis with ascites     Anemia     CKD (chronic kidney disease) stage 3, GFR 30-59 ml/min 8/14/2018    Encounter for blood transfusion     multiple blood transfusions    ESRD on dialysis     History of hematemesis 9/28/2017    Hypertension     Seizures     Substance abuse     alcohol and opioids    Thrombocytopenia     Transplanted liver     10/2017       Past Surgical History:   Procedure Laterality Date    APPENDECTOMY      CENTRAL VENOUS CATHETER TUNNELED INSERTION DOUBLE LUMEN      inserted and removed    ERCP      ERCP N/A 7/2/2018    Procedure: ERCP;  Surgeon: Solis Villanueva MD;  Location: [facility] ([address] [place] [address]);  Service: Endoscopy;  Laterality: N/A;  dialysis/cirrhosis/labs prior to ERCP/svn  platelet    ESOPHAGOGASTRODUODENOSCOPY      LIVER TRANSPLANT      10/2017       Review of patient's allergies indicates:   Allergen Reactions    Bactrim [sulfamethoxazole-trimethoprim] Other (See Comments)     Mookie Trell Syndrome       Medications:  Medications Prior to Admission   Medication Sig    amLODIPine (NORVASC) 5 MG tablet TAKE 1 TABLET BY MOUTH EVERY DAY    famotidine (PEPCID) 20 MG tablet TAKE 1 TABLET BY MOUTH EVERY EVENING    ondansetron (ZOFRAN-ODT) 8 MG TbDL Take 1 tablet (8 mg total) by mouth every 8 (eight) hours as needed (nausea).    oxyCODONE-acetaminophen (PERCOCET)  mg per tablet Take 1 tablet by mouth every 4 (four) hours as needed for Pain.    sirolimus (RAPAMUNE) 1 MG Tab Take 3 tablets (3 mg total) by mouth once daily.     Antibiotics (From admission, onward)    Start     Stop Route Frequency Ordered    11/23/19 1045  piperacillin-tazobactam 4.5 g in sodium chloride 0.9% 100 mL IVPB (ready to mix system)      -- IV Every 8 hours (non-standard times) 11/23/19 0937        Antifungals (From admission, onward)    None        Antivirals (From admission,  onward)    None           Immunization History   Administered Date(s) Administered    Hepatitis A, Adult 2018    Hepatitis A, Pediatric/Adolescent, 2 Dose 10/11/2017    Hepatitis B, Adult 10/12/2017    Influenza - High Dose - PF (65 years and older) 10/12/2017    Pneumococcal Conjugate - 13 Valent 10/12/2017    Pneumococcal Polysaccharide - 23 Valent 2018    Tdap 10/11/2017       Family History     Problem Relation (Age of Onset)    Alcohol abuse Father    Cancer Father    No Known Problems Mother, Sister, Brother        Social History     Socioeconomic History    Marital status: Single     Spouse name: Not on file    Number of children: Not on file    Years of education: Not on file    Highest education level: Not on file   Occupational History     Comment: full time    Social Needs    Financial resource strain: Not on file    Food insecurity:     Worry: Not on file     Inability: Not on file    Transportation needs:     Medical: Not on file     Non-medical: Not on file   Tobacco Use    Smoking status: Former Smoker     Packs/day: 1.00     Years: 10.00     Pack years: 10.00     Types: Cigarettes     Last attempt to quit: 2017     Years since quittin.7    Smokeless tobacco: Never Used   Substance and Sexual Activity    Alcohol use: No     Comment: a fifth of liquor daily for years, cut back over last 2 months    Drug use: No    Sexual activity: Not on file     Comment: single   Lifestyle    Physical activity:     Days per week: Not on file     Minutes per session: Not on file    Stress: Not on file   Relationships    Social connections:     Talks on phone: Not on file     Gets together: Not on file     Attends Voodoo service: Not on file     Active member of club or organization: Not on file     Attends meetings of clubs or organizations: Not on file     Relationship status: Not on file   Other Topics Concern    Not on file   Social History Narrative    He lives in  [facility] with his roommate      Review of Systems   Constitutional: Positive for fever. Negative for activity change, appetite change, chills, diaphoresis, fatigue and unexpected weight change.   HENT: Negative for sore throat and trouble swallowing.    Eyes: Negative for visual disturbance.   Respiratory: Negative for chest tightness and shortness of breath.    Cardiovascular: Negative for chest pain and leg swelling.   Gastrointestinal: Positive for abdominal pain and nausea. Negative for abdominal distention, anal bleeding, blood in stool, constipation, diarrhea and vomiting.   Genitourinary: Negative for dysuria and hematuria.   Musculoskeletal: Negative for arthralgias and myalgias.   Skin: Negative for rash.   Neurological: Negative for dizziness, weakness, light-headedness and headaches.   Psychiatric/Behavioral: Negative for agitation and confusion.     Objective:     Vital Signs (Most Recent):  Temp: 99.9 °F (37.7 °C) (11/23/19 1931)  Pulse: 93 (11/23/19 1931)  Resp: 18 (11/23/19 1716)  BP: (!) 155/91 (11/23/19 1931)  SpO2: 98 % (11/23/19 1931) Vital Signs (24h Range):  Temp:  [98.2 °F (36.8 °C)-101.1 °F (38.4 °C)] 99.9 °F (37.7 °C)  Pulse:  [] 93  Resp:  [14-18] 18  SpO2:  [97 %-100 %] 98 %  BP: (136-178)/(79-96) 155/91     Weight: 62 kg (136 lb 11 oz)  Body mass index is 22.06 kg/m².    Estimated Creatinine Clearance: 45.1 mL/min (A) (based on SCr of 2.1 mg/dL (H)).    Physical Exam   Constitutional: He is oriented to person, place, and time. No distress.   Appears comfortable, sitting up against the wall in hallway. Accompanied by mother.   HENT:   Head: Normocephalic and atraumatic.   Mouth/Throat: Oropharynx is clear and moist. No oropharyngeal exudate.   Eyes: Conjunctivae are normal. No scleral icterus.   Neck: No JVD present.   Cardiovascular: Normal rate, regular rhythm, normal heart sounds and intact distal pulses.   Pulmonary/Chest: Effort normal and breath sounds normal. No  respiratory distress.   Abdominal: Soft. Bowel sounds are normal. He exhibits no distension and no mass. There is tenderness. There is rebound. There is no guarding.   Soft abdomen, non - distended. Tenderness diffuse, but predominantly RUQ. No guarding. Some rebound tenderness.   Musculoskeletal: He exhibits no edema or tenderness.   Lymphadenopathy:     He has no cervical adenopathy.   Neurological: He is alert and oriented to person, place, and time.   Skin: Skin is warm. Capillary refill takes less than 2 seconds. He is not diaphoretic.   Psychiatric: He has a normal mood and affect. His behavior is normal. Judgment and thought content normal.   Nursing note and vitals reviewed.      Significant Labs:   CBC:   Recent Labs   Lab 11/22/19 2149 11/23/19 0458   WBC 5.83 5.67   HGB 9.3* 9.4*   HCT 29.2* 28.9*   * 183     CMP:   Recent Labs   Lab 11/22/19 2149 11/23/19 0458    136   K 3.8 4.2    106   CO2 20* 17*   GLU 91 83   BUN 12 11   CREATININE 2.3* 2.1*   CALCIUM 8.7 9.2   PROT 7.9 8.8*   ALBUMIN 2.4* 2.6*   BILITOT 0.7 0.7   ALKPHOS 176* 201*   AST 19 26   ALT 12 11   ANIONGAP 9 13   EGFRNONAA 36.7* 41.0*       Significant Imaging: I have reviewed all pertinent imaging results/findings within the past 24 hours.

## 2019-11-24 NOTE — ASSESSMENT & PLAN NOTE
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 Needed multiple sessions of dialysis and finally slowly recovered. Last sCr 1.4 in 07/2019. sCr at 2.3 on admission, LAURA from current abdominal pathology.  sCr currently at 2.2

## 2019-11-24 NOTE — CONSULTS
[facility]  Infectious Disease  Consult Note    Patient Name: Jhonny Diana  MRN: [id number]  Admission Date: 11/22/2019  Hospital Length of Stay: 0 days  Attending Physician: Michelle Rowe MD  Primary Care Provider: Primary Doctor No     Isolation Status: No active isolations    Patient information was obtained from patient and past medical records.      Inpatient consult to Infectious Diseases  Consult performed by: Melanie Bergman MD  Consult ordered by: Michael Laureano MD  Reason for consult: abdominal pain + N/V in complicated OLT pt        Assessment/Plan:     Enteritis  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, CMV D+/R+, steroid induction, on maintenance sirolimus; c/b seizures, LAURA, superificial wound infection s/p wound vac closure, culture negative peritonitis due to suspected indolent biliary leakage s/p repeated sphincterotomy/biliary stent placement, subsequent loculated RLQ collection s/p perc-drainage and prolonged empiric antibiotics course, gastritis/duodenal ulcer 3/6/2018, and CMV infection 3/2018) who was admitted on 11/22/2019 as a transfer from an OSH in [facility] (close to 2wks into admission) with low grade temps (peak 100F), N/V, and RUQ abdominal pain due to enteritis of unknown etiology (seen on MRI abdomen 11/21 at OSH; notable no significant ascites, fluid collection, or pancreatitis on that study; patient denies associated diarrhea or bloody stools). He remains tenuous -- differential most notable for CMV, community acquired viral infection moreso than acute bacterial illness.    - will review images with radiology when available in house (specifically MRI) -- may continue zosyn for now til review is complete  - await pending CMV quant and if symptoms persist with negative quant, may need endoscopy and biopsy early next week  - will f/u pending cultures in house        Thank you for your  consult. I will follow-up with patient. Please contact us if you have any additional questions.     Melanie Bergman MD  Transplant ID Attending  [id number]    Melanie Bergman MD  Infectious Disease  [facility]    Subjective:     Principal Problem: Enteritis of infectious origin    HPI: Mr. Diana is a 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, CMV D+/R+, steroid induction, on maintenance sirolimus; c/b seizures, LAURA, superificial wound infection s/p wound vac closure, culture negative peritonitis due to suspected indolent biliary leakage s/p repeated sphincterotomy/biliary stent placement, subsequent loculated RLQ collection s/p perc-drainage and prolonged empiric antibiotics course, gastritis/duodenal ulcer 3/6/2018, and CMV infection 3/2018) who was admitted on 11/22/2019 as a transfer from an OSH in [facility] (close to 2wks into admission) with low grade temps (peak 100F), N/V, and RUQ abdominal pain due to enteritis of unknown etiology (seen on MRI abdomen 11/21 at OSH; notable no significant ascites, fluid collection, or pancreatitis on that study). Patient denies associated diarrhea or bloody stools. He notes only minimal improvement in his symptoms since he first presented to the OSH. No evidence of CMV testing is apparent from OSH workup faxed over.    Past Medical History:   Diagnosis Date    Abdominal pain 5/27/2018    Alcoholic cirrhosis 8/14/2018    Alcoholic hepatitis with ascites     Anemia     CKD (chronic kidney disease) stage 3, GFR 30-59 ml/min 8/14/2018    Encounter for blood transfusion     multiple blood transfusions    ESRD on dialysis     History of hematemesis 9/28/2017    Hypertension     Seizures     Substance abuse     alcohol and opioids    Thrombocytopenia     Transplanted liver     10/2017       Past Surgical History:   Procedure Laterality Date    APPENDECTOMY      CENTRAL VENOUS  CATHETER TUNNELED INSERTION DOUBLE LUMEN      inserted and removed    ERCP      ERCP N/A 7/2/2018    Procedure: ERCP;  Surgeon: Solis Villanueva MD;  Location: [facility] ([address] [place] [address]);  Service: Endoscopy;  Laterality: N/A;  dialysis/cirrhosis/labs prior to ERCP/svn  platelet    ESOPHAGOGASTRODUODENOSCOPY      LIVER TRANSPLANT      10/2017       Review of patient's allergies indicates:   Allergen Reactions    Bactrim [sulfamethoxazole-trimethoprim] Other (See Comments)     Mookie Trell Syndrome       Medications:  Medications Prior to Admission   Medication Sig    amLODIPine (NORVASC) 5 MG tablet TAKE 1 TABLET BY MOUTH EVERY DAY    famotidine (PEPCID) 20 MG tablet TAKE 1 TABLET BY MOUTH EVERY EVENING    ondansetron (ZOFRAN-ODT) 8 MG TbDL Take 1 tablet (8 mg total) by mouth every 8 (eight) hours as needed (nausea).    oxyCODONE-acetaminophen (PERCOCET)  mg per tablet Take 1 tablet by mouth every 4 (four) hours as needed for Pain.    sirolimus (RAPAMUNE) 1 MG Tab Take 3 tablets (3 mg total) by mouth once daily.     Antibiotics (From admission, onward)    Start     Stop Route Frequency Ordered    11/23/19 1045  piperacillin-tazobactam 4.5 g in sodium chloride 0.9% 100 mL IVPB (ready to mix system)      -- IV Every 8 hours (non-standard times) 11/23/19 0937        Antifungals (From admission, onward)    None        Antivirals (From admission, onward)    None           Immunization History   Administered Date(s) Administered    Hepatitis A, Adult 08/27/2018    Hepatitis A, Pediatric/Adolescent, 2 Dose 10/11/2017    Hepatitis B, Adult 10/12/2017    Influenza - High Dose - PF (65 years and older) 10/12/2017    Pneumococcal Conjugate - 13 Valent 10/12/2017    Pneumococcal Polysaccharide - 23 Valent 08/27/2018    Tdap 10/11/2017       Family History     Problem Relation (Age of Onset)    Alcohol abuse Father    Cancer Father    No Known Problems Mother, Sister, Brother        Social History      Socioeconomic History    Marital status: Single     Spouse name: Not on file    Number of children: Not on file    Years of education: Not on file    Highest education level: Not on file   Occupational History     Comment: full time    Social Needs    Financial resource strain: Not on file    Food insecurity:     Worry: Not on file     Inability: Not on file    Transportation needs:     Medical: Not on file     Non-medical: Not on file   Tobacco Use    Smoking status: Former Smoker     Packs/day: 1.00     Years: 10.00     Pack years: 10.00     Types: Cigarettes     Last attempt to quit: 2017     Years since quittin.7    Smokeless tobacco: Never Used   Substance and Sexual Activity    Alcohol use: No     Comment: a fifth of liquor daily for years, cut back over last 2 months    Drug use: No    Sexual activity: Not on file     Comment: single   Lifestyle    Physical activity:     Days per week: Not on file     Minutes per session: Not on file    Stress: Not on file   Relationships    Social connections:     Talks on phone: Not on file     Gets together: Not on file     Attends Restorationist service: Not on file     Active member of club or organization: Not on file     Attends meetings of clubs or organizations: Not on file     Relationship status: Not on file   Other Topics Concern    Not on file   Social History Narrative    He lives in [facility] with his roommate      Review of Systems   Constitutional: Positive for fever. Negative for activity change, appetite change, chills, diaphoresis, fatigue and unexpected weight change.   HENT: Negative for sore throat and trouble swallowing.    Eyes: Negative for visual disturbance.   Respiratory: Negative for chest tightness and shortness of breath.    Cardiovascular: Negative for chest pain and leg swelling.   Gastrointestinal: Positive for abdominal pain and nausea. Negative for abdominal distention, anal bleeding, blood in stool,  constipation, diarrhea and vomiting.   Genitourinary: Negative for dysuria and hematuria.   Musculoskeletal: Negative for arthralgias and myalgias.   Skin: Negative for rash.   Neurological: Negative for dizziness, weakness, light-headedness and headaches.   Psychiatric/Behavioral: Negative for agitation and confusion.     Objective:     Vital Signs (Most Recent):  Temp: 99.9 °F (37.7 °C) (11/23/19 1931)  Pulse: 93 (11/23/19 1931)  Resp: 18 (11/23/19 1716)  BP: (!) 155/91 (11/23/19 1931)  SpO2: 98 % (11/23/19 1931) Vital Signs (24h Range):  Temp:  [98.2 °F (36.8 °C)-101.1 °F (38.4 °C)] 99.9 °F (37.7 °C)  Pulse:  [] 93  Resp:  [14-18] 18  SpO2:  [97 %-100 %] 98 %  BP: (136-178)/(79-96) 155/91     Weight: 62 kg (136 lb 11 oz)  Body mass index is 22.06 kg/m².    Estimated Creatinine Clearance: 45.1 mL/min (A) (based on SCr of 2.1 mg/dL (H)).    Physical Exam   Constitutional: He is oriented to person, place, and time. No distress.   Appears comfortable, sitting up against the wall in hallway. Accompanied by mother.   HENT:   Head: Normocephalic and atraumatic.   Mouth/Throat: Oropharynx is clear and moist. No oropharyngeal exudate.   Eyes: Conjunctivae are normal. No scleral icterus.   Neck: No JVD present.   Cardiovascular: Normal rate, regular rhythm, normal heart sounds and intact distal pulses.   Pulmonary/Chest: Effort normal and breath sounds normal. No respiratory distress.   Abdominal: Soft. Bowel sounds are normal. He exhibits no distension and no mass. There is tenderness. There is rebound. There is no guarding.   Soft abdomen, non - distended. Tenderness diffuse, but predominantly RUQ. No guarding. Some rebound tenderness.   Musculoskeletal: He exhibits no edema or tenderness.   Lymphadenopathy:     He has no cervical adenopathy.   Neurological: He is alert and oriented to person, place, and time.   Skin: Skin is warm. Capillary refill takes less than 2 seconds. He is not diaphoretic.   Psychiatric: He  has a normal mood and affect. His behavior is normal. Judgment and thought content normal.   Nursing note and vitals reviewed.      Significant Labs:   CBC:   Recent Labs   Lab 11/22/19 2149 11/23/19 0458   WBC 5.83 5.67   HGB 9.3* 9.4*   HCT 29.2* 28.9*   * 183     CMP:   Recent Labs   Lab 11/22/19 2149 11/23/19 0458    136   K 3.8 4.2    106   CO2 20* 17*   GLU 91 83   BUN 12 11   CREATININE 2.3* 2.1*   CALCIUM 8.7 9.2   PROT 7.9 8.8*   ALBUMIN 2.4* 2.6*   BILITOT 0.7 0.7   ALKPHOS 176* 201*   AST 19 26   ALT 12 11   ANIONGAP 9 13   EGFRNONAA 36.7* 41.0*       Significant Imaging: I have reviewed all pertinent imaging results/findings within the past 24 hours.

## 2019-11-25 ENCOUNTER — ANESTHESIA EVENT (OUTPATIENT)
Dept: ENDOSCOPY | Facility: HOSPITAL | Age: 29
DRG: 371 | End: 2019-11-25
Payer: COMMERCIAL

## 2019-11-25 ENCOUNTER — ANESTHESIA (OUTPATIENT)
Dept: ENDOSCOPY | Facility: HOSPITAL | Age: 29
DRG: 371 | End: 2019-11-25
Payer: COMMERCIAL

## 2019-11-25 LAB
ALBUMIN SERPL BCP-MCNC: 2.2 G/DL (ref 3.5–5.2)
ALP SERPL-CCNC: 182 U/L (ref 55–135)
ALT SERPL W/O P-5'-P-CCNC: 9 U/L (ref 10–44)
ANION GAP SERPL CALC-SCNC: 9 MMOL/L (ref 8–16)
AST SERPL-CCNC: 17 U/L (ref 10–40)
BASOPHILS # BLD AUTO: 0.01 K/UL (ref 0–0.2)
BASOPHILS NFR BLD: 0.2 % (ref 0–1.9)
BILIRUB SERPL-MCNC: 0.6 MG/DL (ref 0.1–1)
BUN SERPL-MCNC: 14 MG/DL (ref 6–20)
CALCIUM SERPL-MCNC: 8.3 MG/DL (ref 8.7–10.5)
CHLORIDE SERPL-SCNC: 107 MMOL/L (ref 95–110)
CO2 SERPL-SCNC: 21 MMOL/L (ref 23–29)
CREAT SERPL-MCNC: 2.3 MG/DL (ref 0.5–1.4)
DIFFERENTIAL METHOD: ABNORMAL
EOSINOPHIL # BLD AUTO: 0.3 K/UL (ref 0–0.5)
EOSINOPHIL NFR BLD: 5 % (ref 0–8)
ERYTHROCYTE [DISTWIDTH] IN BLOOD BY AUTOMATED COUNT: 14.4 % (ref 11.5–14.5)
EST. GFR  (AFRICAN AMERICAN): 42.5 ML/MIN/1.73 M^2
EST. GFR  (NON AFRICAN AMERICAN): 36.7 ML/MIN/1.73 M^2
GLUCOSE SERPL-MCNC: 84 MG/DL (ref 70–110)
HCT VFR BLD AUTO: 24 % (ref 40–54)
HGB BLD-MCNC: 7.4 G/DL (ref 14–18)
IMM GRANULOCYTES # BLD AUTO: 0.02 K/UL (ref 0–0.04)
IMM GRANULOCYTES NFR BLD AUTO: 0.4 % (ref 0–0.5)
LYMPHOCYTES # BLD AUTO: 1.6 K/UL (ref 1–4.8)
LYMPHOCYTES NFR BLD: 27.8 % (ref 18–48)
MCH RBC QN AUTO: 26.4 PG (ref 27–31)
MCHC RBC AUTO-ENTMCNC: 30.8 G/DL (ref 32–36)
MCV RBC AUTO: 86 FL (ref 82–98)
MONOCYTES # BLD AUTO: 0.6 K/UL (ref 0.3–1)
MONOCYTES NFR BLD: 10.8 % (ref 4–15)
NEUTROPHILS # BLD AUTO: 3.1 K/UL (ref 1.8–7.7)
NEUTROPHILS NFR BLD: 55.8 % (ref 38–73)
NRBC BLD-RTO: 0 /100 WBC
PLATELET # BLD AUTO: 120 K/UL (ref 150–350)
PMV BLD AUTO: 9.6 FL (ref 9.2–12.9)
POTASSIUM SERPL-SCNC: 3.7 MMOL/L (ref 3.5–5.1)
PROT SERPL-MCNC: 7.3 G/DL (ref 6–8.4)
RBC # BLD AUTO: 2.8 M/UL (ref 4.6–6.2)
SODIUM SERPL-SCNC: 137 MMOL/L (ref 136–145)
WBC # BLD AUTO: 5.57 K/UL (ref 3.9–12.7)

## 2019-11-25 PROCEDURE — 99233 SBSQ HOSP IP/OBS HIGH 50: CPT | Mod: ,,, | Performed by: HOSPITALIST

## 2019-11-25 PROCEDURE — 85025 COMPLETE CBC W/AUTO DIFF WBC: CPT

## 2019-11-25 PROCEDURE — 88342 IMHCHEM/IMCYTCHM 1ST ANTB: CPT | Mod: 26,,, | Performed by: PATHOLOGY

## 2019-11-25 PROCEDURE — 43239 EGD BIOPSY SINGLE/MULTIPLE: CPT | Mod: ,,, | Performed by: INTERNAL MEDICINE

## 2019-11-25 PROCEDURE — 63600175 PHARM REV CODE 636 W HCPCS: Performed by: STUDENT IN AN ORGANIZED HEALTH CARE EDUCATION/TRAINING PROGRAM

## 2019-11-25 PROCEDURE — 11000001 HC ACUTE MED/SURG PRIVATE ROOM

## 2019-11-25 PROCEDURE — D9220A PRA ANESTHESIA: Mod: CRNA,,, | Performed by: NURSE ANESTHETIST, CERTIFIED REGISTERED

## 2019-11-25 PROCEDURE — 63600175 PHARM REV CODE 636 W HCPCS: Performed by: INTERNAL MEDICINE

## 2019-11-25 PROCEDURE — 80053 COMPREHEN METABOLIC PANEL: CPT

## 2019-11-25 PROCEDURE — 25000003 PHARM REV CODE 250: Performed by: STUDENT IN AN ORGANIZED HEALTH CARE EDUCATION/TRAINING PROGRAM

## 2019-11-25 PROCEDURE — 27201012 HC FORCEPS, HOT/COLD, DISP: Performed by: INTERNAL MEDICINE

## 2019-11-25 PROCEDURE — 88312 PR  SPECIAL STAINS,GROUP I: ICD-10-PCS | Mod: 26,,, | Performed by: PATHOLOGY

## 2019-11-25 PROCEDURE — 36415 COLL VENOUS BLD VENIPUNCTURE: CPT

## 2019-11-25 PROCEDURE — 37000008 HC ANESTHESIA 1ST 15 MINUTES: Performed by: INTERNAL MEDICINE

## 2019-11-25 PROCEDURE — 25000003 PHARM REV CODE 250: Performed by: INTERNAL MEDICINE

## 2019-11-25 PROCEDURE — 43239 EGD BIOPSY SINGLE/MULTIPLE: CPT | Performed by: INTERNAL MEDICINE

## 2019-11-25 PROCEDURE — 88312 SPECIAL STAINS GROUP 1: CPT | Mod: 26,,, | Performed by: PATHOLOGY

## 2019-11-25 PROCEDURE — 37000009 HC ANESTHESIA EA ADD 15 MINS: Performed by: INTERNAL MEDICINE

## 2019-11-25 PROCEDURE — D9220A PRA ANESTHESIA: ICD-10-PCS | Mod: CRNA,,, | Performed by: NURSE ANESTHETIST, CERTIFIED REGISTERED

## 2019-11-25 PROCEDURE — 88342 CHG IMMUNOCYTOCHEMISTRY: ICD-10-PCS | Mod: 26,,, | Performed by: PATHOLOGY

## 2019-11-25 PROCEDURE — 88305 TISSUE EXAM BY PATHOLOGIST: ICD-10-PCS | Mod: 26,,, | Performed by: PATHOLOGY

## 2019-11-25 PROCEDURE — 80195 ASSAY OF SIROLIMUS: CPT

## 2019-11-25 PROCEDURE — 99233 PR SUBSEQUENT HOSPITAL CARE,LEVL III: ICD-10-PCS | Mod: ,,, | Performed by: INTERNAL MEDICINE

## 2019-11-25 PROCEDURE — 99233 SBSQ HOSP IP/OBS HIGH 50: CPT | Mod: ,,, | Performed by: INTERNAL MEDICINE

## 2019-11-25 PROCEDURE — D9220A PRA ANESTHESIA: ICD-10-PCS | Mod: ANES,,, | Performed by: ANESTHESIOLOGY

## 2019-11-25 PROCEDURE — 88305 TISSUE EXAM BY PATHOLOGIST: CPT | Mod: 26,,, | Performed by: PATHOLOGY

## 2019-11-25 PROCEDURE — 43239 PR EGD, FLEX, W/BIOPSY, SGL/MULTI: ICD-10-PCS | Mod: ,,, | Performed by: INTERNAL MEDICINE

## 2019-11-25 PROCEDURE — 88305 TISSUE EXAM BY PATHOLOGIST: CPT | Mod: 59 | Performed by: PATHOLOGY

## 2019-11-25 PROCEDURE — 63600175 PHARM REV CODE 636 W HCPCS: Performed by: NURSE ANESTHETIST, CERTIFIED REGISTERED

## 2019-11-25 PROCEDURE — 99233 PR SUBSEQUENT HOSPITAL CARE,LEVL III: ICD-10-PCS | Mod: ,,, | Performed by: HOSPITALIST

## 2019-11-25 PROCEDURE — 88312 SPECIAL STAINS GROUP 1: CPT | Performed by: PATHOLOGY

## 2019-11-25 PROCEDURE — D9220A PRA ANESTHESIA: Mod: ANES,,, | Performed by: ANESTHESIOLOGY

## 2019-11-25 PROCEDURE — 88342 IMHCHEM/IMCYTCHM 1ST ANTB: CPT | Mod: 59 | Performed by: PATHOLOGY

## 2019-11-25 RX ORDER — PROPOFOL 10 MG/ML
VIAL (ML) INTRAVENOUS
Status: DISCONTINUED | OUTPATIENT
Start: 2019-11-25 | End: 2019-11-25

## 2019-11-25 RX ORDER — PROPOFOL 10 MG/ML
VIAL (ML) INTRAVENOUS CONTINUOUS PRN
Status: DISCONTINUED | OUTPATIENT
Start: 2019-11-25 | End: 2019-11-25

## 2019-11-25 RX ORDER — LIDOCAINE HCL/PF 100 MG/5ML
SYRINGE (ML) INTRAVENOUS
Status: DISCONTINUED | OUTPATIENT
Start: 2019-11-25 | End: 2019-11-25

## 2019-11-25 RX ORDER — SODIUM CHLORIDE 9 MG/ML
INJECTION, SOLUTION INTRAVENOUS CONTINUOUS PRN
Status: DISCONTINUED | OUTPATIENT
Start: 2019-11-25 | End: 2019-11-25

## 2019-11-25 RX ADMIN — PIPERACILLIN AND TAZOBACTAM 4.5 G: 4; .5 INJECTION, POWDER, FOR SOLUTION INTRAVENOUS at 06:11

## 2019-11-25 RX ADMIN — HYDROMORPHONE HYDROCHLORIDE 0.5 MG: 1 INJECTION, SOLUTION INTRAMUSCULAR; INTRAVENOUS; SUBCUTANEOUS at 09:11

## 2019-11-25 RX ADMIN — SIROLIMUS 1 MG: 1 TABLET ORAL at 09:11

## 2019-11-25 RX ADMIN — HYDROCODONE BITARTRATE AND ACETAMINOPHEN 1 TABLET: 10; 325 TABLET ORAL at 10:11

## 2019-11-25 RX ADMIN — GANCICLOVIR SODIUM 155 MG: 500 INJECTION, POWDER, LYOPHILIZED, FOR SOLUTION INTRAVENOUS at 04:11

## 2019-11-25 RX ADMIN — HYDROMORPHONE HYDROCHLORIDE 0.5 MG: 1 INJECTION, SOLUTION INTRAMUSCULAR; INTRAVENOUS; SUBCUTANEOUS at 07:11

## 2019-11-25 RX ADMIN — HYDROCODONE BITARTRATE AND ACETAMINOPHEN 1 TABLET: 10; 325 TABLET ORAL at 01:11

## 2019-11-25 RX ADMIN — ENOXAPARIN SODIUM 40 MG: 100 INJECTION SUBCUTANEOUS at 04:11

## 2019-11-25 RX ADMIN — PIPERACILLIN AND TAZOBACTAM 4.5 G: 4; .5 INJECTION, POWDER, FOR SOLUTION INTRAVENOUS at 03:11

## 2019-11-25 RX ADMIN — FOLIC ACID 1 MG: 1 TABLET ORAL at 09:11

## 2019-11-25 RX ADMIN — HYDROMORPHONE HYDROCHLORIDE 0.5 MG: 1 INJECTION, SOLUTION INTRAMUSCULAR; INTRAVENOUS; SUBCUTANEOUS at 02:11

## 2019-11-25 RX ADMIN — PROPOFOL 80 MG: 10 INJECTION, EMULSION INTRAVENOUS at 11:11

## 2019-11-25 RX ADMIN — PANTOPRAZOLE SODIUM 40 MG: 40 TABLET, DELAYED RELEASE ORAL at 09:11

## 2019-11-25 RX ADMIN — LIDOCAINE HYDROCHLORIDE 100 MG: 20 INJECTION, SOLUTION INTRAVENOUS at 11:11

## 2019-11-25 RX ADMIN — PROPOFOL 200 MCG/KG/MIN: 10 INJECTION, EMULSION INTRAVENOUS at 11:11

## 2019-11-25 RX ADMIN — SODIUM CHLORIDE: 0.9 INJECTION, SOLUTION INTRAVENOUS at 11:11

## 2019-11-25 RX ADMIN — PIPERACILLIN AND TAZOBACTAM 4.5 G: 4; .5 INJECTION, POWDER, FOR SOLUTION INTRAVENOUS at 10:11

## 2019-11-25 RX ADMIN — HYDROMORPHONE HYDROCHLORIDE 0.5 MG: 1 INJECTION, SOLUTION INTRAMUSCULAR; INTRAVENOUS; SUBCUTANEOUS at 04:11

## 2019-11-25 NOTE — ASSESSMENT & PLAN NOTE
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, CMV D+/R+, steroid induction, on maintenance sirolimus; c/b seizures, LAURA, superificial wound infection s/p wound vac closure, culture negative peritonitis due to suspected indolent biliary leakage s/p repeated sphincterotomy/biliary stent placement, subsequent loculated RLQ collection s/p perc-drainage and prolonged empiric antibiotics course, gastritis/duodenal ulcer 3/6/2018, and CMV infection 3/2018) who was admitted on 11/22/2019 as a transfer from an OSH in [facility] (close to 2wks into admission) with low grade temps (peak 100F), N/V, and RUQ abdominal pain due to enteritis of unknown etiology (seen on MRI abdomen 11/21 at OSH; notable no significant ascites, fluid collection, or pancreatitis on that study; patient denies associated diarrhea or bloody stools). He remains tenuous -- differential most notable for CMV, community acquired viral infection moreso than acute bacterial illness.    - if blood cultures remain negative by morning, would stop zosyn and observe off antibiotics (has completed a prolonged course without clear indication at OSH)  - will start induction GCV today (2.5mg/kg IV q24h for current CrCl) and observe response  - will f/u pending cultures in house  - will f/u CMV quant and endoscopy biopsies

## 2019-11-25 NOTE — ASSESSMENT & PLAN NOTE
Per records patient was on cyclosporine and due to concern for TMA he was switched to rapamune in 05/2018    -Hepatology following  -Sirolimus 1mg per hepatology recs   -daily CBC, BMP, LFT, INR, immunosuppressant trough level

## 2019-11-25 NOTE — PROGRESS NOTES
"[facility] Hospital Medicine  Progress Note    Patient Name: Jhonny Diana  MRN: [id number]  Patient Class: IP- Inpatient   Admission Date: 11/22/2019  Length of Stay: 2 days  Attending Physician: Michelle Rowe MD  Primary Care Provider: Primary Doctor [facility] Medicine Team: [facility] HOSP MED 1 LUISANA Moffett MD    Subjective:     Principal Problem:Enteritis of infectious origin    HPI:  30 yo male patient with a history of HTN and Liver transplant performed 10/19/17 secondary to acute alcoholic hepatitis with multiple post-op complications including biliary stenosis s/p multiple ERCPs, adhesions, abdominal infections and prolonged LAURA requiring dialysis, who presents today as a transfer from [place], [place] due to R sided abdominal pain and fever.  His symptoms began around 11/11 with abdominal pain located on the right side, described as a colicky-type of pain of variable intensity, ranging from 3/10 to 9/10 accompanied by fever, poor appetite, nausea and 1 episode of vomiting. For this reason he presented to [facility] on 11/18/19 where per transfer note  he was hypertensive and tachycardic. He was started on empiric zosyn.  Imaging CT non-contrast and MRI abdomen demonstrated signs of enteritis(mucosal thickening) small right pleural effusion, splenomegaly.  An U/S abdomen with comment of "possible cirrhosis". He also developed a drop in h/h needing 2 PRBC transfusions along with mild stable Cr elevation (1.8-2.1). The patient was supposed to be transferred but due to insurance problems he left [place] and was driven here by his mother.  In the ER his Tmax was 99.4 HR 88 /83 Sats 98% RA. Currently he complains of pain 6/10 located to the right side that travels to his right flank. Denies any changes in his bowel habits, no constipation or diarrhea. Denies having a paracentesis at OSH.     Overview/Hospital Course:  Patient was admitted for abdominal pain. MRI Abd w/o contrast at " OSH showed changes consistent with enteritis. ID and hepatology consulted. Patient was started on zosyn on admission. CMV pending. Started on ganciclovir with plan to undergo EGD performed and bcx was done.     Interval History: Patient continue to have similar pain that get controlled with current pain regimen. Patient did not get his Cytovene since he was placed in OBS units yesterday. Patient will get his medication today and EGD performed and bcx checking for CMV was done today. Patient eating diet after the procedure and recovering.     Review of Systems   Constitutional: Positive for fever. Negative for activity change, appetite change, chills, diaphoresis, fatigue and unexpected weight change.   HENT: Negative for congestion, sore throat and trouble swallowing.    Eyes: Negative for visual disturbance.   Respiratory: Negative for cough, chest tightness and shortness of breath.    Cardiovascular: Negative for chest pain and leg swelling.   Gastrointestinal: Positive for abdominal pain and nausea. Negative for abdominal distention, anal bleeding, blood in stool, constipation, diarrhea and vomiting.   Genitourinary: Negative for difficulty urinating, dysuria and hematuria.   Musculoskeletal: Negative for arthralgias, back pain and myalgias.   Skin: Negative for rash.   Neurological: Negative for dizziness, weakness, light-headedness and headaches.   Psychiatric/Behavioral: Negative for agitation, behavioral problems, confusion and decreased concentration.     Objective:     Vital Signs (Most Recent):  Temp: 98 °F (36.7 °C) (11/25/19 1300)  Pulse: 85 (11/25/19 1300)  Resp: 16 (11/25/19 1300)  BP: 136/82 (11/25/19 1300)  SpO2: 100 % (11/25/19 1300) Vital Signs (24h Range):  Temp:  [98 °F (36.7 °C)-99.5 °F (37.5 °C)] 98 °F (36.7 °C)  Pulse:  [75-94] 85  Resp:  [13-49] 16  SpO2:  [96 %-100 %] 100 %  BP: (122-141)/(62-82) 136/82     Weight: 61.7 kg (136 lb 2.1 oz)  Body mass index is 21.97 kg/m².    Intake/Output  Summary (Last 24 hours) at 11/25/2019 1600  Last data filed at 11/25/2019 0700  Gross per 24 hour   Intake 100 ml   Output --   Net 100 ml      Physical Exam   Constitutional: He is oriented to person, place, and time. He appears well-developed and well-nourished.   Appears uncomfortable    HENT:   Head: Normocephalic and atraumatic.   Mouth/Throat: Oropharynx is clear and moist.   Eyes: Pupils are equal, round, and reactive to light. Conjunctivae and EOM are normal.   Neck: Normal range of motion.   Cardiovascular: Normal rate, regular rhythm, normal heart sounds and intact distal pulses.   No murmur heard.  Pulmonary/Chest: Effort normal and breath sounds normal. He has no wheezes. He has no rales.   Abdominal: Soft. Bowel sounds are normal. He exhibits no distension and no mass. There is tenderness (Diffusely tender, Mostly on RUQ. radiating to left back). A hernia (umbilical reducible hernia noted) is present.   Musculoskeletal: Normal range of motion. He exhibits no edema or tenderness.   Neurological: He is alert and oriented to person, place, and time.   Skin: Skin is warm and dry. No rash noted. No erythema.   Psychiatric: He has a normal mood and affect. His behavior is normal. Thought content normal.       Significant Labs:   BMP:   Recent Labs   Lab 11/25/19  0414   GLU 84      K 3.7      CO2 21*   BUN 14   CREATININE 2.3*   CALCIUM 8.3*     CBC:   Recent Labs   Lab 11/24/19  0431 11/24/19  1406 11/25/19  0414   WBC 5.87 6.06 5.57   HGB 7.8* 7.9* 7.4*   HCT 25.1* 25.5* 24.0*   * 127* 120*     CMP:   Recent Labs   Lab 11/24/19  0431 11/25/19  0414    137   K 3.6 3.7    107   CO2 17* 21*   GLU 95 84   BUN 12 14   CREATININE 2.2* 2.3*   CALCIUM 8.4* 8.3*   PROT 7.1 7.3   ALBUMIN 2.2* 2.2*   BILITOT 0.6 0.6   ALKPHOS 188* 182*   AST 20 17   ALT 10 9*   ANIONGAP 11 9   EGFRNONAA 38.8* 36.7*     TSH: No results for input(s): TSH in the last 4320 hours.  All pertinent labs within  the past 24 hours have been reviewed.    Significant Imaging: I have reviewed all pertinent imaging results/findings within the past 24 hours.      Assessment/Plan:      * Enteritis of infectious origin  Patient presented with abdominal pain suspicious for CMV colitis. MRI of the abdomen w/o contrast at OSH shows mild small bowel mucosa thickening  consistent with enteritis. Suspicious for CMV in this immunosuppressed patient   Awaiting CMV studies  ID consulted- recommended to stop zosyn, Continue  on ganciclovir  -will f/u pending cultures in house  -will f/u CMV quant and endoscopy biopsies      Anemia of chronic disease  Per chart review, Hb had been stable in the 10.5-12 range for the past months. H/H dropped at OSH with no obvious source of bleeding and needed 2 tranfusions.  - Although reliability will no be optimal since he was already transfused.   Claudia, LDH, T bili wnl. Haptoglobin elevated likely due to inflammatory process  Iron studies consistent with anemia of chronic disease.  Transfuse if Hgb <7      Right upper quadrant abdominal pain  28 yo male with Liver transplant in 2017 secondary to alcoholic hepatitis and multiple post-op complications including biliary duct stricture s/p multiple ERCPs w/stent placement, infections and prolonged LAURA requiring dialysis, coming for severe right sided abdominal pain with fever. Per notes, imaging from OSH CT non-contrast and MRI abdomen demonstrate signs of enteritis(mucosal thickening) small right pleural effusion and splenomegaly.      Umbilical hernia  Reducible. 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 Needed multiple sessions of dialysis and finally slowly recovered. Last sCr 1.4 in 07/2019. sCr at 2.3 on admission, LAURA from current abdominal pathology.  sCr currently at  2.2      Thrombocytopenia  Pt does have a history of thrombocytopenia and underwent bone marrow biopsy in 2018 thought to be from meds vs peripheral destruction. Currently at 148.      Alcohol use disorder, severe, in early remission  Patient states he has not taken alcohol recently.    Long-term use of immunosuppressant medication  Per records patient was on cyclosporine and due to concern for TMA he was switched to rapamune in 05/2018    -Hepatology following  -Sirolimus 1mg per hepatology recs   -daily CBC, BMP, LFT, INR, immunosuppressant trough level        S/P liver transplant  Performed in 10/19/19 secondary to alcoholic hepatitis. LFTs not concerning for rejection.         VTE Risk Mitigation (From admission, onward)         Ordered     enoxaparin injection 40 mg  Daily      11/23/19 0300                      LUISANA Moffett MD  Department of Hospital Medicine   [facility]

## 2019-11-25 NOTE — SUBJECTIVE & OBJECTIVE
Interval History: Afebrile. Underwent EGD that pt states was grossly unremarkable other than a polyp (awaiting report). Is scheduled to start first dose of GCV induction today (couldn't start yesterday til room change out of OBS).    Review of Systems   Constitutional: Positive for fever. Negative for activity change, appetite change, chills, diaphoresis, fatigue and unexpected weight change.   HENT: Negative for sore throat and trouble swallowing.    Eyes: Negative for visual disturbance.   Respiratory: Negative for chest tightness and shortness of breath.    Cardiovascular: Negative for chest pain and leg swelling.   Gastrointestinal: Positive for abdominal pain and nausea. Negative for abdominal distention, anal bleeding, blood in stool, constipation, diarrhea and vomiting.   Genitourinary: Negative for dysuria and hematuria.   Musculoskeletal: Negative for arthralgias and myalgias.   Skin: Negative for rash.   Neurological: Negative for dizziness, weakness, light-headedness and headaches.   Psychiatric/Behavioral: Negative for agitation and confusion.     Objective:     Vital Signs (Most Recent):  Temp: 98 °F (36.7 °C) (11/25/19 1300)  Pulse: 85 (11/25/19 1300)  Resp: 16 (11/25/19 1300)  BP: 136/82 (11/25/19 1300)  SpO2: 100 % (11/25/19 1300) Vital Signs (24h Range):  Temp:  [98 °F (36.7 °C)-99.8 °F (37.7 °C)] 98 °F (36.7 °C)  Pulse:  [75-94] 85  Resp:  [13-49] 16  SpO2:  [96 %-100 %] 100 %  BP: (122-141)/(62-82) 136/82     Weight: 61.7 kg (136 lb 2.1 oz)  Body mass index is 21.97 kg/m².    Estimated Creatinine Clearance: 41.1 mL/min (A) (based on SCr of 2.3 mg/dL (H)).    Physical Exam   Constitutional: He is oriented to person, place, and time. No distress.   Appears comfortable, sitting up against the wall in hallway. Accompanied by mother.   HENT:   Head: Normocephalic and atraumatic.   Mouth/Throat: Oropharynx is clear and moist. No oropharyngeal exudate.   Eyes: Conjunctivae are normal. No scleral icterus.    Neck: No JVD present.   Cardiovascular: Normal rate, regular rhythm, normal heart sounds and intact distal pulses.   Pulmonary/Chest: Effort normal and breath sounds normal. No respiratory distress.   Abdominal: Soft. Bowel sounds are normal. He exhibits no distension and no mass. There is tenderness. There is rebound. There is no guarding.   Soft abdomen, non - distended. Tenderness diffuse, but predominantly RUQ. No guarding. Some rebound tenderness.   Musculoskeletal: He exhibits no edema or tenderness.   Lymphadenopathy:     He has no cervical adenopathy.   Neurological: He is alert and oriented to person, place, and time.   Skin: Skin is warm. Capillary refill takes less than 2 seconds. He is not diaphoretic.   Psychiatric: He has a normal mood and affect. His behavior is normal. Judgment and thought content normal.   Nursing note and vitals reviewed.      Significant Labs:   CBC:   Recent Labs   Lab 11/24/19  0431 11/24/19  1406 11/25/19  0414   WBC 5.87 6.06 5.57   HGB 7.8* 7.9* 7.4*   HCT 25.1* 25.5* 24.0*   * 127* 120*     CMP:   Recent Labs   Lab 11/24/19  0431 11/25/19  0414    137   K 3.6 3.7    107   CO2 17* 21*   GLU 95 84   BUN 12 14   CREATININE 2.2* 2.3*   CALCIUM 8.4* 8.3*   PROT 7.1 7.3   ALBUMIN 2.2* 2.2*   BILITOT 0.6 0.6   ALKPHOS 188* 182*   AST 20 17   ALT 10 9*   ANIONGAP 11 9   EGFRNONAA 38.8* 36.7*       Significant Imaging: I have reviewed all pertinent imaging results/findings within the past 24 hours.

## 2019-11-25 NOTE — NURSING TRANSFER
Nursing Transfer Note      11/25/2019     Transfer To: [address] From [facility]    Transfer via wheelchair    Transfer with belongings    Transported by PCT    Medicines sent: INFUSING    Chart send with patient: Yes    Notified:NURSE    Patient reassessed at:11/25/19 1300(date, time)    Upon arrival to floor: bed in lowest position

## 2019-11-25 NOTE — TRANSFER OF CARE
"Anesthesia Transfer of Care Note    Patient: Jhonny Diana    Procedure(s) Performed: Procedure(s) (LRB):  EGD (ESOPHAGOGASTRODUODENOSCOPY) (N/A)    Patient location: [facility]    Anesthesia Type: general    Transport from OR: Transported from OR on 6-10 L/min O2 by face mask with adequate spontaneous ventilation    Post pain: adequate analgesia    Post assessment: no apparent anesthetic complications and tolerated procedure well    Post vital signs: stable    Level of consciousness: awake, alert and oriented    Nausea/Vomiting: no nausea/vomiting    Complications: none    Transfer of care protocol was followed      Last vitals:   Visit Vitals  /80 (Patient Position: Lying)   Pulse 81   Temp 37.5 °C (99.5 °F) (Temporal)   Resp 14   Ht 5' 6" (1.676 m)   Wt 61.7 kg (136 lb 2.1 oz)   SpO2 99%   BMI 21.97 kg/m²     "

## 2019-11-25 NOTE — INTERVAL H&P NOTE
The patient has been examined and the H&P has been reviewed:    I concur with the findings and no changes have occurred since H&P was written.    Anesthesia/Surgery risks, benefits and alternative options discussed and understood by patient/family.    Pre-Procedure H and P Addendum    Patient seen and examined.  History and exam unchanged from prior history and physical.      Procedure: EGD  Indication: r/o CMV  ASA Class: per anesthesiology  Airway: per anesthesia  Neck Mobility: full range of motion  Mallampatti score: per anesthesia  History of anesthesia problems: no  Family history of anesthesia problems: no  Anesthesia Plan: per anesthesia        Active Hospital Problems    Diagnosis  POA    *Enteritis of infectious origin [A09]  Yes    LAURA (acute kidney injury) [N17.9]  Yes    Weakness [R53.1]  Yes    Right upper quadrant abdominal pain [R10.11]  Yes    Anemia of chronic disease [D63.8]  Yes    History of opioid abuse [F11.11]  Yes     Previously on suboxone      Enteritis [K52.9]  Yes    Umbilical hernia [K42.9]  Yes    Acute kidney injury superimposed on CKD [N17.9, N18.9]  Yes    Thrombocytopenia [D69.6]  Yes    Anemia of chronic renal failure [N18.9, D63.1]  Yes    Alcohol use disorder, severe, in early remission [F10.21]  Yes    Constipation [K59.00]  Yes    S/P liver transplant [Z94.4]  Not Applicable     Chronic    Long-term use of immunosuppressant medication [Z79.899]  Not Applicable     Chronic      Resolved Hospital Problems   No resolved problems to display.

## 2019-11-25 NOTE — [employer] PATIENT INSTRUCTIONS
Discharge Summary/Instructions after an Endoscopic Procedure  Patient Name: Jhonny Diana  Patient MRN: [id number]  Patient YOB: 1990 Monday, November 25, 2019  Sheri Siddiqui MD  RESTRICTIONS:  During your procedure today, you received medications for sedation.  These   medications may affect your judgment, balance and coordination.  Therefore,   for 24 hours, you have the following restrictions:   - DO NOT drive a car, operate machinery, make legal/financial decisions,   sign important papers or drink alcohol.    ACTIVITY:  Today: no heavy lifting, straining or running due to procedural   sedation/anesthesia.  The following day: return to full activity including work.  DIET:  Eat and drink normally unless instructed otherwise.     TREATMENT FOR COMMON SIDE EFFECTS:  - Mild abdominal pain, nausea, belching, bloating or excessive gas:  rest,   eat lightly and use a heating pad.  - Sore Throat: treat with throat lozenges and/or gargle with warm salt   water.  - Because air was used during the procedure, expelling large amounts of air   from your rectum or belching is normal.  - If a bowel prep was taken, you may not have a bowel movement for 1-3 days.    This is normal.  SYMPTOMS TO WATCH FOR AND REPORT TO YOUR PHYSICIAN:  1. Abdominal pain or bloating, other than gas cramps.  2. Chest pain.  3. Back pain.  4. Signs of infection such as: chills or fever occurring within 24 hours   after the procedure.  5. Rectal bleeding, which would show as bright red, maroon, or black stools.   (A tablespoon of blood from the rectum is not serious, especially if   hemorrhoids are present.)  6. Vomiting.  7. Weakness or dizziness.  GO DIRECTLY TO THE NEAREST EMERGENCY ROOM IF YOU HAVE ANY OF THE FOLLOWING:      Difficulty breathing              Chills and/or fever over 101 F   Persistent vomiting and/or vomiting blood   Severe abdominal pain   Severe chest pain   Black, tarry stools   Bleeding- more than one  tablespoon   Any other symptom or condition that you feel may need urgent attention  Your doctor recommends these additional instructions:  If any biopsies were taken, your doctors clinic will contact you in 1 to 2   weeks with any results.  - Return patient to hospital [facility] for ongoing care.   - Resume previous diet.   - Continue present medications.   - Await pathology results.   - The findings and recommendations were discussed with the patient.   - Further recomendations as per Hepatology team.   For questions, problems or results please call your physician - Sheri Siddiqui MD at Work:  [phone].  [facility], EMERGENCY ROOM PHONE NUMBER: [phone]  IF A COMPLICATION OR EMERGENCY SITUATION ARISES AND YOU ARE UNABLE TO REACH   YOUR PHYSICIAN - GO DIRECTLY TO THE EMERGENCY ROOM.  Sheri Siddiqui MD  11/25/2019 11:46:17 AM  This report has been verified and signed electronically.  [employer]

## 2019-11-25 NOTE — PLAN OF CARE
Patient is AAOx4. He left the unit at 10:20am to have a EGD. He ambulates around room to bathroom. C/O pain to abdomen and discomfort is being treated with PRN pain meds per MD orders. Started his dose of IV Ganciclovir infusing to right thumb. Family is at bedside. Safety measures maintained.

## 2019-11-25 NOTE — NURSING
Pt arrived to room 623 with nurses accompanying by mother, VSS Needs acknowledged. . C/O pain , but states will take when needed. Will monitor.

## 2019-11-25 NOTE — NURSING
Spoke with Dr. Bergman and med team 1, pt is to be transferred to different unit due to the need of getting ganciclovir. The drug is not administered on observation unit per hospital policy.

## 2019-11-25 NOTE — ASSESSMENT & PLAN NOTE
Patient presented with abdominal pain suspicious for CMV colitis. MRI of the abdomen w/o contrast at OSH shows mild small bowel mucosa thickening  consistent with enteritis. Suspicious for CMV in this immunosuppressed patient   Awaiting CMV studies  ID consulted- recommended to stop zosyn, Continue  on ganciclovir  -will f/u pending cultures in house  -will f/u CMV quant and endoscopy biopsies

## 2019-11-25 NOTE — PLAN OF CARE
CM met with patient for discharge planning.  Patient states he lives with two roommates in a one story house with no steps to enter.  Patient is independent with ADL's.  His mother will provide his transport home.  Plan is to discharge home with family.    Pharmacy:    [id number] - [place], [place] - [address]  [address]  [place] [address]  Phone: [phone] Fax: [phone]    PCP:  Dr. Jhonny Angulo    Payor: BLUE CROSS BLUE SHIELD / Plan: BCBS ALL OUT OF STATE / Product Type: PPO /      11/25/19 1441   Discharge Assessment   Assessment Type Discharge Planning Assessment   Confirmed/corrected address and phone number on facesheet? Yes   Assessment information obtained from? Patient   Expected Length of Stay (days) 3   Communicated expected length of stay with patient/caregiver yes   Prior to hospitilization cognitive status: Alert/Oriented   Prior to hospitalization functional status: Independent   Current cognitive status: Alert/Oriented   Current Functional Status: Independent   Facility Arrived From: From [facility] in [place], [place]   Lives With other (see comments)  (2 roommates)   Able to Return to Prior Arrangements yes   Is patient able to care for self after discharge? Unable to determine at this time (comments)   Who are your caregiver(s) and their phone number(s)? Elvira Diana - mother [phone] work   Patient's perception of discharge disposition home or selfcare   Readmission Within the Last 30 Days no previous admission in last 30 days   Patient currently being followed by outpatient case management? No   Patient currently receives home health services? No   Patient currently receives any other outside agency services? No   Equipment Currently Used at Home none   Do you have any problems affording any of your prescribed medications? No   Is the patient taking medications as prescribed? yes   Does the patient have  transportation home? Yes   Transportation Anticipated family or friend will provide   Does the patient receive services at the [facility]madin Clinic? No   Discharge Plan A Home with family   Discharge Plan B Home Health   DME Needed Upon Discharge  none   Patient/Family in Agreement with Plan yes

## 2019-11-25 NOTE — DISCHARGE INSTRUCTIONS
Upper GI Endoscopy     During endoscopy, a long, flexible tube is used to view the inside of your upper GI tract.      Upper GI endoscopy allows your healthcare provider to look directly into the beginning of your gastrointestinal (GI) tract. The esophagus, stomach, and duodenum (the first part of the small intestine) make up the upper GI tract.   Before the exam  Follow these and any other instructions you are given before your endoscopy. If you dont follow the healthcare providers instructions carefully, the test may need to be canceled or done over:  · Don't eat or drink anything after midnight the night before your exam. If your exam is in the afternoon, drink only clear liquids in the morning. Don't eat or drink anything for 8 hours before the exam. In some cases, you may be able to take medicines with sips of water until 2 hours before the procedure. Speak with your healthcare provider about this.   · Bring your X-rays and any other test results you have.  · Because you will be sedated, arrange for an adult to drive you home after the exam.  · Tell your healthcare provider before the exam if you are taking any medicines or have any medical problems.  The procedure  Here is what to expect:  · You will lie on the endoscopy table. Usually patients lie on the left side.  · You will be monitored and given oxygen.  · Your throat may be numbed with a spray or gargle. You are given medicine through an intravenous (IV) line that will help you relax and remain comfortable. You may be awake or asleep during the procedure.  · The healthcare provider will put the endoscope in your mouth and down your esophagus. It is thinner than most pieces of food that you swallow. It will not affect your breathing. The medicine helps keep you from gagging.  · Air is put into your GI tract to expand it. It can make you burp.  · During the procedure, the healthcare provider can take biopsies (tissue samples), remove abnormalities,  such as polyps, or treat abnormalities through a variety of devices placed through the endoscope. You will not feel this.   · The endoscope carries images of your upper GI tract to a video screen. If you are awake, you may be able to look at the images.  · After the procedure is done, you will rest for a time. An adult must drive you home.  When to call your healthcare provider  Contact your healthcare provider if you have:  · Black or tarry stools, or blood in your stool  · Fever  · Pain in your belly that does not go away  · Nausea and vomiting, or vomiting blood   Date Last Reviewed: 7/1/2016  © 6251-1044 [employer]. [address] [place] [address], [place] [address]. All rights reserved. This information is not intended as a substitute for professional medical care. Always follow your healthcare professional's instructions.

## 2019-11-25 NOTE — SUBJECTIVE & OBJECTIVE
Interval History: Patient continue to have similar pain that get controlled with current pain regimen. Patient did not get his Cytovene since he was placed in OBS units yesterday. Patient will get his medication today and EGD performed and bcx checking for CMV was done today. Patient eating diet after the procedure and recovering.     Review of Systems   Constitutional: Positive for fever. Negative for activity change, appetite change, chills, diaphoresis, fatigue and unexpected weight change.   HENT: Negative for congestion, sore throat and trouble swallowing.    Eyes: Negative for visual disturbance.   Respiratory: Negative for cough, chest tightness and shortness of breath.    Cardiovascular: Negative for chest pain and leg swelling.   Gastrointestinal: Positive for abdominal pain and nausea. Negative for abdominal distention, anal bleeding, blood in stool, constipation, diarrhea and vomiting.   Genitourinary: Negative for difficulty urinating, dysuria and hematuria.   Musculoskeletal: Negative for arthralgias, back pain and myalgias.   Skin: Negative for rash.   Neurological: Negative for dizziness, weakness, light-headedness and headaches.   Psychiatric/Behavioral: Negative for agitation, behavioral problems, confusion and decreased concentration.     Objective:     Vital Signs (Most Recent):  Temp: 98 °F (36.7 °C) (11/25/19 1300)  Pulse: 85 (11/25/19 1300)  Resp: 16 (11/25/19 1300)  BP: 136/82 (11/25/19 1300)  SpO2: 100 % (11/25/19 1300) Vital Signs (24h Range):  Temp:  [98 °F (36.7 °C)-99.5 °F (37.5 °C)] 98 °F (36.7 °C)  Pulse:  [75-94] 85  Resp:  [13-49] 16  SpO2:  [96 %-100 %] 100 %  BP: (122-141)/(62-82) 136/82     Weight: 61.7 kg (136 lb 2.1 oz)  Body mass index is 21.97 kg/m².    Intake/Output Summary (Last 24 hours) at 11/25/2019 1600  Last data filed at 11/25/2019 0700  Gross per 24 hour   Intake 100 ml   Output --   Net 100 ml      Physical Exam   Constitutional: He is oriented to person, place, and  time. He appears well-developed and well-nourished.   Appears uncomfortable    HENT:   Head: Normocephalic and atraumatic.   Mouth/Throat: Oropharynx is clear and moist.   Eyes: Pupils are equal, round, and reactive to light. Conjunctivae and EOM are normal.   Neck: Normal range of motion.   Cardiovascular: Normal rate, regular rhythm, normal heart sounds and intact distal pulses.   No murmur heard.  Pulmonary/Chest: Effort normal and breath sounds normal. He has no wheezes. He has no rales.   Abdominal: Soft. Bowel sounds are normal. He exhibits no distension and no mass. There is tenderness (Diffusely tender, Mostly on RUQ. radiating to left back). A hernia (umbilical reducible hernia noted) is present.   Musculoskeletal: Normal range of motion. He exhibits no edema or tenderness.   Neurological: He is alert and oriented to person, place, and time.   Skin: Skin is warm and dry. No rash noted. No erythema.   Psychiatric: He has a normal mood and affect. His behavior is normal. Thought content normal.       Significant Labs:   BMP:   Recent Labs   Lab 11/25/19  0414   GLU 84      K 3.7      CO2 21*   BUN 14   CREATININE 2.3*   CALCIUM 8.3*     CBC:   Recent Labs   Lab 11/24/19  0431 11/24/19  1406 11/25/19  0414   WBC 5.87 6.06 5.57   HGB 7.8* 7.9* 7.4*   HCT 25.1* 25.5* 24.0*   * 127* 120*     CMP:   Recent Labs   Lab 11/24/19  0431 11/25/19  0414    137   K 3.6 3.7    107   CO2 17* 21*   GLU 95 84   BUN 12 14   CREATININE 2.2* 2.3*   CALCIUM 8.4* 8.3*   PROT 7.1 7.3   ALBUMIN 2.2* 2.2*   BILITOT 0.6 0.6   ALKPHOS 188* 182*   AST 20 17   ALT 10 9*   ANIONGAP 11 9   EGFRNONAA 38.8* 36.7*     TSH: No results for input(s): TSH in the last 4320 hours.  All pertinent labs within the past 24 hours have been reviewed.    Significant Imaging: I have reviewed all pertinent imaging results/findings within the past 24 hours.

## 2019-11-25 NOTE — PROGRESS NOTES
[facility]  Infectious Disease  Progress Note    Patient Name: Jhonny Diana  MRN: [id number]  Admission Date: 11/22/2019  Length of Stay: 2 days  Attending Physician: Michelle Rowe MD  Primary Care Provider: Primary Doctor No    Isolation Status: No active isolations  Assessment/Plan:      Enteritis  30yo man w/a history of asthma and alcoholic cirrhosis (c/b HE, EV, portal HTN, and HRS; s/p DDLT 10/19/2017, CMV D+/R+, steroid induction, on maintenance sirolimus; c/b seizures, LAURA, superificial wound infection s/p wound vac closure, culture negative peritonitis due to suspected indolent biliary leakage s/p repeated sphincterotomy/biliary stent placement, subsequent loculated RLQ collection s/p perc-drainage and prolonged empiric antibiotics course, gastritis/duodenal ulcer 3/6/2018, and CMV infection 3/2018) who was admitted on 11/22/2019 as a transfer from an OSH in [facility] (close to 2wks into admission) with low grade temps (peak 100F), N/V, and RUQ abdominal pain due to enteritis of unknown etiology (seen on MRI abdomen 11/21 at OSH; notable no significant ascites, fluid collection, or pancreatitis on that study; patient denies associated diarrhea or bloody stools). He remains tenuous -- differential most notable for CMV, community acquired viral infection moreso than acute bacterial illness.    - if blood cultures remain negative by morning, would stop zosyn and observe off antibiotics (has completed a prolonged course without clear indication at OSH)  - will start induction GCV today (2.5mg/kg IV q24h for current CrCl) and observe response  - will f/u pending cultures in house  - will f/u CMV quant and endoscopy biopsies        Anticipated Disposition: pending improvement    Thank you for your consult. I will follow-up with patient. Please contact us if you have any additional questions.     Melanie Bergman MD  Transplant ID Attending  [phone]    Melanie Bergman MD  Infectious  Disease  [facility]    Subjective:     Principal Problem:Enteritis of infectious origin    HPI: No notes on file  Interval History: Afebrile. Underwent EGD that pt states was grossly unremarkable other than a polyp (awaiting report). Is scheduled to start first dose of GCV induction today (couldn't start yesterday til room change out of OBS).    Review of Systems   Constitutional: Positive for fever. Negative for activity change, appetite change, chills, diaphoresis, fatigue and unexpected weight change.   HENT: Negative for sore throat and trouble swallowing.    Eyes: Negative for visual disturbance.   Respiratory: Negative for chest tightness and shortness of breath.    Cardiovascular: Negative for chest pain and leg swelling.   Gastrointestinal: Positive for abdominal pain and nausea. Negative for abdominal distention, anal bleeding, blood in stool, constipation, diarrhea and vomiting.   Genitourinary: Negative for dysuria and hematuria.   Musculoskeletal: Negative for arthralgias and myalgias.   Skin: Negative for rash.   Neurological: Negative for dizziness, weakness, light-headedness and headaches.   Psychiatric/Behavioral: Negative for agitation and confusion.     Objective:     Vital Signs (Most Recent):  Temp: 98 °F (36.7 °C) (11/25/19 1300)  Pulse: 85 (11/25/19 1300)  Resp: 16 (11/25/19 1300)  BP: 136/82 (11/25/19 1300)  SpO2: 100 % (11/25/19 1300) Vital Signs (24h Range):  Temp:  [98 °F (36.7 °C)-99.8 °F (37.7 °C)] 98 °F (36.7 °C)  Pulse:  [75-94] 85  Resp:  [13-49] 16  SpO2:  [96 %-100 %] 100 %  BP: (122-141)/(62-82) 136/82     Weight: 61.7 kg (136 lb 2.1 oz)  Body mass index is 21.97 kg/m².    Estimated Creatinine Clearance: 41.1 mL/min (A) (based on SCr of 2.3 mg/dL (H)).    Physical Exam   Constitutional: He is oriented to person, place, and time. No distress.   Appears comfortable, sitting up against the wall in hallway. Accompanied by mother.   HENT:   Head: Normocephalic and  atraumatic.   Mouth/Throat: Oropharynx is clear and moist. No oropharyngeal exudate.   Eyes: Conjunctivae are normal. No scleral icterus.   Neck: No JVD present.   Cardiovascular: Normal rate, regular rhythm, normal heart sounds and intact distal pulses.   Pulmonary/Chest: Effort normal and breath sounds normal. No respiratory distress.   Abdominal: Soft. Bowel sounds are normal. He exhibits no distension and no mass. There is tenderness. There is rebound. There is no guarding.   Soft abdomen, non - distended. Tenderness diffuse, but predominantly RUQ. No guarding. Some rebound tenderness.   Musculoskeletal: He exhibits no edema or tenderness.   Lymphadenopathy:     He has no cervical adenopathy.   Neurological: He is alert and oriented to person, place, and time.   Skin: Skin is warm. Capillary refill takes less than 2 seconds. He is not diaphoretic.   Psychiatric: He has a normal mood and affect. His behavior is normal. Judgment and thought content normal.   Nursing note and vitals reviewed.      Significant Labs:   CBC:   Recent Labs   Lab 11/24/19  0431 11/24/19  1406 11/25/19  0414   WBC 5.87 6.06 5.57   HGB 7.8* 7.9* 7.4*   HCT 25.1* 25.5* 24.0*   * 127* 120*     CMP:   Recent Labs   Lab 11/24/19  0431 11/25/19  0414    137   K 3.6 3.7    107   CO2 17* 21*   GLU 95 84   BUN 12 14   CREATININE 2.2* 2.3*   CALCIUM 8.4* 8.3*   PROT 7.1 7.3   ALBUMIN 2.2* 2.2*   BILITOT 0.6 0.6   ALKPHOS 188* 182*   AST 20 17   ALT 10 9*   ANIONGAP 11 9   EGFRNONAA 38.8* 36.7*       Significant Imaging: I have reviewed all pertinent imaging results/findings within the past 24 hours.

## 2019-11-25 NOTE — TREATMENT PLAN
Hepatology Treatment Plan    Jhonny Diana is a 29 y.o. male admitted to hospital 11/22/2019 (Hospital Day: 4) due to Enteritis of infectious origin. Hepatology following for immunosuppression management.    Lab Results   Component Value Date    TACROLIMUS <1.5 (L) 03/04/2018    TACROLIMUS 1.6 (L) 03/03/2018    TACROLIMUS 3.2 (L) 03/02/2018    TACROLIMUS 3.2 (L) 03/02/2018    TACROLIMUS 3.2 (L) 03/02/2018       Lab Results   Component Value Date    CREATININE 2.3 (H) 11/25/2019    CREATININE 2.2 (H) 11/24/2019    CREATININE 2.1 (H) 11/23/2019       Lab Results   Component Value Date    ALT 9 (L) 11/25/2019    AST 17 11/25/2019     (H) 10/26/2017    ALKPHOS 182 (H) 11/25/2019    BILITOT 0.6 11/25/2019    WBC 5.57 11/25/2019    HGB 7.4 (L) 11/25/2019     (L) 11/25/2019       Kidney function is stable  Liver enzymes are stable    Plan  - Continue immunosuppression regimen without changes    Immunosuppression Regimen:  Sirolimus: 1mg daily     EGD done today negative for lesion suspicious for CMV.  Biopsies were obtained and will continue to follow up results.    Please notify hepatology on day of discharge to discuss discharge medications and follow up.     Please obtain daily CBC, BMP, LFT, INR, immunosuppressant trough level    Thank you for involving us in the care of Jhonny Diana. Please call with any additional concerns or questions.    Aneudy Barrios MD  Gastroenterology Fellow

## 2019-11-25 NOTE — ANESTHESIA PREPROCEDURE EVALUATION
"                                                                                                             11/25/2019  Jhonny Diana is a 29 y.o., male.    Per ED consult note 11/24/2019:  28 yo male patient with a history of HTN and Liver transplant performed 10/19/17 secondary to acute alcoholic hepatitis with multiple post-op complications including biliary stenosis s/p multiple ERCPs, adhesions, abdominal infections and prolonged LAURA requiring dialysis, who presents today as a transfer from [place], [place] due to R sided abdominal pain and fever.  His symptoms began around 11/11 with abdominal pain located on the right side, described as a colicky-type of pain of variable intensity, ranging from 3/10 to 9/10 accompanied by fever, poor appetite, nausea and 1 episode of vomiting. For this reason he presented to [facility] on 11/18/19 where per transfer note  he was hypertensive and tachycardic. He was started on empiric zosyn.  Imaging CT non-contrast and MRI abdomen demonstrated signs of enteritis(mucosal thickening) small right pleural effusion, splenomegaly.  An U/S abdomen with comment of "possible cirrhosis". He also developed a drop in h/h needing 2 PRBC transfusions along with mild stable Cr elevation (1.8-2.1). The patient was supposed to be transferred but due to insurance problems he left [place] and was driven here by his mother.  In the ER his Tmax was 99.4 HR 88 /83 Sats 98% RA. Currently he complains of pain 6/10 located to the right side that travels to his right flank. Denies any changes in his bowel habits, no constipation or diarrhea. Denies having a paracentesis at OSH.   Past Medical History:   Diagnosis Date    Abdominal pain 5/27/2018    Alcoholic cirrhosis 8/14/2018    Alcoholic hepatitis with ascites     Anemia     CKD (chronic kidney disease) stage 3, GFR 30-59 ml/min 8/14/2018    Encounter for blood transfusion     multiple blood transfusions    ESRD on dialysis     " History of hematemesis 9/28/2017    Hypertension     Seizures     Substance abuse     alcohol and opioids    Thrombocytopenia     Transplanted liver     10/2017     Past Surgical History:   Procedure Laterality Date    APPENDECTOMY      CENTRAL VENOUS CATHETER TUNNELED INSERTION DOUBLE LUMEN      inserted and removed    ERCP      ERCP N/A 7/2/2018    Procedure: ERCP;  Surgeon: Solis Villanueva MD;  Location: [facility] ([address] [place] [address]);  Service: Endoscopy;  Laterality: N/A;  dialysis/cirrhosis/labs prior to ERCP/svn  platelet    ESOPHAGOGASTRODUODENOSCOPY      LIVER TRANSPLANT      10/2017     Patient Active Problem List   Diagnosis    S/P liver transplant    Prophylactic immunotherapy    Long-term use of immunosuppressant medication    At risk for opportunistic infections    Biliary stricture of transplanted liver    Hypothyroidism    Constipation    Vitamin D deficiency    Alcohol use disorder, severe, in early remission    Anemia of chronic renal failure    Chronic kidney disease-mineral and bone disorder    Other pancytopenia    Thrombocytopenia    Anxiety about health    Opioid dependence    Acute kidney injury superimposed on CKD    Substance abuse    Lymphocytopenia    Umbilical hernia    Enteritis    Weakness    Right upper quadrant abdominal pain    Anemia of chronic disease    Enteritis of infectious origin    History of opioid abuse    LAURA (acute kidney injury)         Anesthesia Evaluation    I have reviewed the Patient Summary Reports.    I have reviewed the Nursing Notes.   I have reviewed the Medications.     Review of Systems  Anesthesia Hx:  No problems with previous Anesthesia  History of prior surgery of interest to airway management or planning: Denies Family Hx of Anesthesia complications.   Denies Personal Hx of Anesthesia complications.   Cardiovascular:   Hypertension   TTE 12/2017:  1 - Normal left ventricular systolic function (EF 60-65%).     2 - No wall motion  abnormalities.     3 - Normal left ventricular diastolic function.     4 - Right atrial enlargement.     5 - Normal right ventricular systolic function .     6 - Trivial mitral regurgitation.     7 - Trivial tricuspid regurgitation.     8 - Pericardial effusion as per text.    Renal/:   Chronic Renal Disease    Hepatic/GI:     S/p OLT 2017   Endocrine:   Hypothyroidism        Physical Exam  General:  Well nourished    Airway/Jaw/Neck:  Airway Findings: Mouth Opening: Normal Tongue: Normal  General Airway Assessment: Adult, Good  Mallampati: II  TM Distance: Normal, at least 6 cm  Jaw/Neck Findings:  Neck ROM: Normal ROM       Chest/Lungs:  Chest/Lungs Findings: Clear to auscultation     Heart/Vascular:  Heart Findings: Rate: Normal  Rhythm: Regular Rhythm        Mental Status:  Mental Status Findings:  Alert and Oriented, Cooperative         Anesthesia Plan  Type of Anesthesia, risks & benefits discussed:  Anesthesia Type:  general  Patient's Preference:   Intra-op Monitoring Plan: standard ASA monitors  Intra-op Monitoring Plan Comments:   Post Op Pain Control Plan: per primary service following discharge from PACU  Post Op Pain Control Plan Comments:   Induction:   IV  Beta Blocker:         Informed Consent: Patient understands risks and agrees with Anesthesia plan.  Questions answered. Anesthesia consent signed with patient.  ASA Score: 3     Day of Surgery Review of History & Physical:    H&P update referred to the surgeon.         Ready For Surgery From Anesthesia Perspective.

## 2019-11-25 NOTE — ANESTHESIA POSTPROCEDURE EVALUATION
Anesthesia Post Evaluation    Patient: Jhonny Diana    Procedure(s) Performed: Procedure(s) (LRB):  EGD (ESOPHAGOGASTRODUODENOSCOPY) (N/A)    Final Anesthesia Type: general    Patient location during evaluation: PACU  Patient participation: Yes- Able to Participate  Level of consciousness: awake and alert  Post-procedure vital signs: reviewed and stable  Pain management: adequate  Airway patency: patent    PONV status at discharge: No PONV  Anesthetic complications: no      Cardiovascular status: hemodynamically stable  Respiratory status: spontaneous ventilation  Follow-up not needed.          Vitals Value Taken Time   /65 11/25/2019 11:47 AM   Temp 37.1 °C (98.8 °F) 11/25/2019 11:43 AM   Pulse 88 11/25/2019 11:59 AM   Resp 56 11/25/2019 11:59 AM   SpO2 100 % 11/25/2019 11:59 AM   Vitals shown include unvalidated device data.      No case tracking events are documented in the log.      Pain/Diogenes Score: Pain Rating Prior to Med Admin: 7 (11/25/2019 10:14 AM)  Pain Rating Post Med Admin: 2 (11/25/2019  2:36 AM)

## 2019-11-26 LAB
ALBUMIN SERPL BCP-MCNC: 2.2 G/DL (ref 3.5–5.2)
ALP SERPL-CCNC: 184 U/L (ref 55–135)
ALT SERPL W/O P-5'-P-CCNC: 10 U/L (ref 10–44)
ANION GAP SERPL CALC-SCNC: 10 MMOL/L (ref 8–16)
AST SERPL-CCNC: 18 U/L (ref 10–40)
BASOPHILS # BLD AUTO: 0.02 K/UL (ref 0–0.2)
BASOPHILS NFR BLD: 0.4 % (ref 0–1.9)
BILIRUB SERPL-MCNC: 0.5 MG/DL (ref 0.1–1)
BLD PROD TYP BPU: NORMAL
BLOOD UNIT EXPIRATION DATE: NORMAL
BLOOD UNIT TYPE CODE: 5100
BLOOD UNIT TYPE: NORMAL
BUN SERPL-MCNC: 15 MG/DL (ref 6–20)
CALCIUM SERPL-MCNC: 8.2 MG/DL (ref 8.7–10.5)
CHLORIDE SERPL-SCNC: 106 MMOL/L (ref 95–110)
CO2 SERPL-SCNC: 20 MMOL/L (ref 23–29)
CODING SYSTEM: NORMAL
CREAT SERPL-MCNC: 2.3 MG/DL (ref 0.5–1.4)
DIFFERENTIAL METHOD: ABNORMAL
DISPENSE STATUS: NORMAL
EOSINOPHIL # BLD AUTO: 0.3 K/UL (ref 0–0.5)
EOSINOPHIL NFR BLD: 5.2 % (ref 0–8)
ERYTHROCYTE [DISTWIDTH] IN BLOOD BY AUTOMATED COUNT: 14.6 % (ref 11.5–14.5)
EST. GFR  (AFRICAN AMERICAN): 42.5 ML/MIN/1.73 M^2
EST. GFR  (NON AFRICAN AMERICAN): 36.7 ML/MIN/1.73 M^2
GLUCOSE SERPL-MCNC: 102 MG/DL (ref 70–110)
HCT VFR BLD AUTO: 23.3 % (ref 40–54)
HGB BLD-MCNC: 7.2 G/DL (ref 14–18)
IMM GRANULOCYTES # BLD AUTO: 0.02 K/UL (ref 0–0.04)
IMM GRANULOCYTES NFR BLD AUTO: 0.4 % (ref 0–0.5)
INR PPP: 1 (ref 0.8–1.2)
LYMPHOCYTES # BLD AUTO: 1.2 K/UL (ref 1–4.8)
LYMPHOCYTES NFR BLD: 25 % (ref 18–48)
MCH RBC QN AUTO: 26.8 PG (ref 27–31)
MCHC RBC AUTO-ENTMCNC: 30.9 G/DL (ref 32–36)
MCV RBC AUTO: 87 FL (ref 82–98)
MONOCYTES # BLD AUTO: 0.4 K/UL (ref 0.3–1)
MONOCYTES NFR BLD: 7.9 % (ref 4–15)
NEUTROPHILS # BLD AUTO: 3 K/UL (ref 1.8–7.7)
NEUTROPHILS NFR BLD: 61.1 % (ref 38–73)
NRBC BLD-RTO: 0 /100 WBC
NUM UNITS TRANS PACKED RBC: NORMAL
PLATELET # BLD AUTO: 135 K/UL (ref 150–350)
PMV BLD AUTO: 9.9 FL (ref 9.2–12.9)
POTASSIUM SERPL-SCNC: 3.5 MMOL/L (ref 3.5–5.1)
PROT SERPL-MCNC: 7.4 G/DL (ref 6–8.4)
PROTHROMBIN TIME: 10.4 SEC (ref 9–12.5)
RBC # BLD AUTO: 2.69 M/UL (ref 4.6–6.2)
SIROLIMUS BLD-MCNC: 11.6 NG/ML (ref 4–20)
SIROLIMUS BLD-MCNC: 12.9 NG/ML (ref 4–20)
SODIUM SERPL-SCNC: 136 MMOL/L (ref 136–145)
WBC # BLD AUTO: 4.96 K/UL (ref 3.9–12.7)

## 2019-11-26 PROCEDURE — 11000001 HC ACUTE MED/SURG PRIVATE ROOM

## 2019-11-26 PROCEDURE — 76937 US GUIDE VASCULAR ACCESS: CPT

## 2019-11-26 PROCEDURE — 36430 TRANSFUSION BLD/BLD COMPNT: CPT

## 2019-11-26 PROCEDURE — P9016 RBC LEUKOCYTES REDUCED: HCPCS

## 2019-11-26 PROCEDURE — 85610 PROTHROMBIN TIME: CPT

## 2019-11-26 PROCEDURE — 25000003 PHARM REV CODE 250: Performed by: INTERNAL MEDICINE

## 2019-11-26 PROCEDURE — 63600175 PHARM REV CODE 636 W HCPCS: Performed by: INTERNAL MEDICINE

## 2019-11-26 PROCEDURE — 99233 SBSQ HOSP IP/OBS HIGH 50: CPT | Mod: ,,, | Performed by: HOSPITALIST

## 2019-11-26 PROCEDURE — 80195 ASSAY OF SIROLIMUS: CPT

## 2019-11-26 PROCEDURE — C1751 CATH, INF, PER/CENT/MIDLINE: HCPCS

## 2019-11-26 PROCEDURE — 25000003 PHARM REV CODE 250: Performed by: STUDENT IN AN ORGANIZED HEALTH CARE EDUCATION/TRAINING PROGRAM

## 2019-11-26 PROCEDURE — 99233 PR SUBSEQUENT HOSPITAL CARE,LEVL III: ICD-10-PCS | Mod: ,,, | Performed by: HOSPITALIST

## 2019-11-26 PROCEDURE — 99233 SBSQ HOSP IP/OBS HIGH 50: CPT | Mod: ,,, | Performed by: INTERNAL MEDICINE

## 2019-11-26 PROCEDURE — 36410 VNPNXR 3YR/> PHY/QHP DX/THER: CPT

## 2019-11-26 PROCEDURE — 85025 COMPLETE CBC W/AUTO DIFF WBC: CPT

## 2019-11-26 PROCEDURE — 36415 COLL VENOUS BLD VENIPUNCTURE: CPT

## 2019-11-26 PROCEDURE — 99233 PR SUBSEQUENT HOSPITAL CARE,LEVL III: ICD-10-PCS | Mod: ,,, | Performed by: INTERNAL MEDICINE

## 2019-11-26 PROCEDURE — 63600175 PHARM REV CODE 636 W HCPCS: Performed by: STUDENT IN AN ORGANIZED HEALTH CARE EDUCATION/TRAINING PROGRAM

## 2019-11-26 PROCEDURE — 80053 COMPREHEN METABOLIC PANEL: CPT

## 2019-11-26 RX ORDER — DIPHENHYDRAMINE HCL 50 MG
50 CAPSULE ORAL ONCE
Status: DISCONTINUED | OUTPATIENT
Start: 2019-11-26 | End: 2019-11-30

## 2019-11-26 RX ORDER — SIROLIMUS 0.5 MG/1
0.5 TABLET, FILM COATED ORAL DAILY
Status: DISCONTINUED | OUTPATIENT
Start: 2019-11-27 | End: 2019-11-30 | Stop reason: HOSPADM

## 2019-11-26 RX ORDER — SODIUM CHLORIDE, SODIUM LACTATE, POTASSIUM CHLORIDE, CALCIUM CHLORIDE 600; 310; 30; 20 MG/100ML; MG/100ML; MG/100ML; MG/100ML
INJECTION, SOLUTION INTRAVENOUS CONTINUOUS
Status: DISCONTINUED | OUTPATIENT
Start: 2019-11-26 | End: 2019-11-29

## 2019-11-26 RX ORDER — ACETAMINOPHEN 500 MG
500 TABLET ORAL ONCE
Status: COMPLETED | OUTPATIENT
Start: 2019-11-26 | End: 2019-11-26

## 2019-11-26 RX ORDER — HYDROCODONE BITARTRATE AND ACETAMINOPHEN 500; 5 MG/1; MG/1
TABLET ORAL
Status: DISCONTINUED | OUTPATIENT
Start: 2019-11-26 | End: 2019-11-30 | Stop reason: HOSPADM

## 2019-11-26 RX ORDER — MORPHINE SULFATE 2 MG/ML
2 INJECTION, SOLUTION INTRAMUSCULAR; INTRAVENOUS ONCE
Status: DISCONTINUED | OUTPATIENT
Start: 2019-11-26 | End: 2019-11-26

## 2019-11-26 RX ADMIN — FOLIC ACID 1 MG: 1 TABLET ORAL at 10:11

## 2019-11-26 RX ADMIN — PANTOPRAZOLE SODIUM 40 MG: 40 TABLET, DELAYED RELEASE ORAL at 10:11

## 2019-11-26 RX ADMIN — SODIUM CHLORIDE, SODIUM LACTATE, POTASSIUM CHLORIDE, AND CALCIUM CHLORIDE: .6; .31; .03; .02 INJECTION, SOLUTION INTRAVENOUS at 10:11

## 2019-11-26 RX ADMIN — HYDROMORPHONE HYDROCHLORIDE 0.5 MG: 1 INJECTION, SOLUTION INTRAMUSCULAR; INTRAVENOUS; SUBCUTANEOUS at 10:11

## 2019-11-26 RX ADMIN — HYDROCODONE BITARTRATE AND ACETAMINOPHEN 1 TABLET: 10; 325 TABLET ORAL at 08:11

## 2019-11-26 RX ADMIN — PIPERACILLIN AND TAZOBACTAM 4.5 G: 4; .5 INJECTION, POWDER, FOR SOLUTION INTRAVENOUS at 01:11

## 2019-11-26 RX ADMIN — PROMETHAZINE HYDROCHLORIDE 12.5 MG: 25 INJECTION INTRAMUSCULAR; INTRAVENOUS at 02:11

## 2019-11-26 RX ADMIN — PROMETHAZINE HYDROCHLORIDE 12.5 MG: 25 INJECTION INTRAMUSCULAR; INTRAVENOUS at 09:11

## 2019-11-26 RX ADMIN — HYDROCODONE BITARTRATE AND ACETAMINOPHEN 1 TABLET: 10; 325 TABLET ORAL at 12:11

## 2019-11-26 RX ADMIN — HYDROCODONE BITARTRATE AND ACETAMINOPHEN 1 TABLET: 10; 325 TABLET ORAL at 01:11

## 2019-11-26 RX ADMIN — ACETAMINOPHEN 500 MG: 500 TABLET ORAL at 09:11

## 2019-11-26 RX ADMIN — SODIUM CHLORIDE, SODIUM LACTATE, POTASSIUM CHLORIDE, AND CALCIUM CHLORIDE: .6; .31; .03; .02 INJECTION, SOLUTION INTRAVENOUS at 09:11

## 2019-11-26 RX ADMIN — HYDROCODONE BITARTRATE AND ACETAMINOPHEN 1 TABLET: 10; 325 TABLET ORAL at 06:11

## 2019-11-26 RX ADMIN — HYDROMORPHONE HYDROCHLORIDE 0.5 MG: 1 INJECTION, SOLUTION INTRAMUSCULAR; INTRAVENOUS; SUBCUTANEOUS at 04:11

## 2019-11-26 RX ADMIN — SODIUM CHLORIDE, SODIUM LACTATE, POTASSIUM CHLORIDE, AND CALCIUM CHLORIDE 500 ML: .6; .31; .03; .02 INJECTION, SOLUTION INTRAVENOUS at 10:11

## 2019-11-26 RX ADMIN — GANCICLOVIR SODIUM 155 MG: 500 INJECTION, POWDER, LYOPHILIZED, FOR SOLUTION INTRAVENOUS at 02:11

## 2019-11-26 NOTE — TREATMENT PLAN
Hepatology Treatment Plan    Jhonny Diana is a 29 y.o. male admitted to hospital 11/22/2019 (Hospital Day: 5) due to Enteritis of infectious origin. Hepatology following for immunosuppression management.    Lab Results   Component Value Date    TACROLIMUS <1.5 (L) 03/04/2018    TACROLIMUS 1.6 (L) 03/03/2018    TACROLIMUS 3.2 (L) 03/02/2018    TACROLIMUS 3.2 (L) 03/02/2018    TACROLIMUS 3.2 (L) 03/02/2018       Lab Results   Component Value Date    CREATININE 2.3 (H) 11/26/2019    CREATININE 2.3 (H) 11/25/2019    CREATININE 2.2 (H) 11/24/2019       Lab Results   Component Value Date    ALT 10 11/26/2019    AST 18 11/26/2019     (H) 10/26/2017    ALKPHOS 184 (H) 11/26/2019    BILITOT 0.5 11/26/2019    WBC 4.96 11/26/2019    HGB 7.2 (L) 11/26/2019     (L) 11/26/2019       Kidney function is stable  Liver enzymes are stable    Plan  - Changes to immunosuppression regimen as below    Immunosuppression Regimen:  Sirolimus: 0.5mg daily    Please notify hepatology on day of discharge to discuss discharge medications and follow up.     Please obtain daily CBC, BMP, LFT, INR, immunosuppressant trough level    Thank you for involving us in the care of Jhonny Diana. Please call with any additional concerns or questions.    Aneudy Barrios MD  Gastroenterology Fellow

## 2019-11-26 NOTE — ASSESSMENT & PLAN NOTE
Per chart review, Hb had been stable in the 10.5-12 range for the past months. H/H dropped at OSH with no obvious source of bleeding and needed 2 tranfusions.  - Although reliability will no be optimal since he was already transfused.   Claudia, LDH, T bili wnl. Haptoglobin elevated likely due to inflammatory process  Iron studies consistent with anemia of chronic disease and bone marrow suppression from active inflammatory process  Transfuse if Hgb <7.    H/H 7.2 in light of symptoms (lightheadness, fatigue). S/p 1 unit of pRBCs

## 2019-11-26 NOTE — SUBJECTIVE & OBJECTIVE
Interval History: NAEO. Patient endorses an irritable mood since GCV was started. Receiving fluids with GCV for renal protection. Remained afebrile overnight. States he feels more tired and endorses lightheadedness especially when he stands from a seated position. Denies chest pain.     Review of Systems   Constitutional: Negative for activity change, appetite change, chills, diaphoresis, fatigue, fever and unexpected weight change.   HENT: Negative for congestion, sore throat and trouble swallowing.    Eyes: Negative for visual disturbance.   Respiratory: Negative for cough, chest tightness and shortness of breath.    Cardiovascular: Negative for chest pain and leg swelling.   Gastrointestinal: Positive for abdominal pain and nausea. Negative for abdominal distention, anal bleeding, blood in stool, constipation, diarrhea and vomiting.   Genitourinary: Negative for difficulty urinating, dysuria and hematuria.   Musculoskeletal: Negative for arthralgias, back pain and myalgias.   Skin: Negative for rash.   Neurological: Negative for dizziness, weakness, light-headedness and headaches.   Psychiatric/Behavioral: Negative for agitation, behavioral problems, confusion and decreased concentration.        Irritable mood     Objective:     Vital Signs (Most Recent):  Temp: 98.6 °F (37 °C) (11/26/19 0839)  Pulse: 76 (11/26/19 0839)  Resp: 18 (11/26/19 0839)  BP: 114/68 (11/26/19 0839)  SpO2: 97 % (11/26/19 0839) Vital Signs (24h Range):  Temp:  [98 °F (36.7 °C)-99.2 °F (37.3 °C)] 98.6 °F (37 °C)  Pulse:  [75-92] 76  Resp:  [13-36] 18  SpO2:  [97 %-100 %] 97 %  BP: (114-145)/(67-85) 114/68     Weight: 61.7 kg (136 lb 2.1 oz)  Body mass index is 21.97 kg/m².    Intake/Output Summary (Last 24 hours) at 11/26/2019 1200  Last data filed at 11/25/2019 1700  Gross per 24 hour   Intake 360 ml   Output --   Net 360 ml      Physical Exam   Constitutional: He is oriented to person, place, and time. He appears well-developed and  well-nourished.   Appears uncomfortable    HENT:   Head: Normocephalic and atraumatic.   Mouth/Throat: Oropharynx is clear and moist.   Eyes: Conjunctivae and EOM are normal.   Neck: Normal range of motion.   Cardiovascular: Normal rate, regular rhythm, normal heart sounds and intact distal pulses.   No murmur heard.  Pulmonary/Chest: Effort normal and breath sounds normal. He has no wheezes. He has no rales.   Abdominal: Soft. Bowel sounds are normal. He exhibits no distension and no mass. There is tenderness (Diffusely tender, Mostly on RUQ. radiating to left back). A hernia (umbilical reducible hernia noted) is present.   Musculoskeletal: Normal range of motion. He exhibits no edema or tenderness.   Neurological: He is alert and oriented to person, place, and time.   Skin: Skin is warm and dry. No rash noted. No erythema.   Psychiatric: He has a normal mood and affect. His behavior is normal. Thought content normal.       Significant Labs:   Blood Culture: No results for input(s): LABBLOO in the last 48 hours.  BMP:   Recent Labs   Lab 11/26/19  0443         K 3.5      CO2 20*   BUN 15   CREATININE 2.3*   CALCIUM 8.2*     CBC:   Recent Labs   Lab 11/24/19  1406 11/25/19  0414 11/26/19  0444   WBC 6.06 5.57 4.96   HGB 7.9* 7.4* 7.2*   HCT 25.5* 24.0* 23.3*   * 120* 135*     CMP:   Recent Labs   Lab 11/25/19  0414 11/26/19  0443    136   K 3.7 3.5    106   CO2 21* 20*   GLU 84 102   BUN 14 15   CREATININE 2.3* 2.3*   CALCIUM 8.3* 8.2*   PROT 7.3 7.4   ALBUMIN 2.2* 2.2*   BILITOT 0.6 0.5   ALKPHOS 182* 184*   AST 17 18   ALT 9* 10   ANIONGAP 9 10   EGFRNONAA 36.7* 36.7*       Significant Imaging: I have reviewed all pertinent imaging results/findings within the past 24 hours.

## 2019-11-26 NOTE — ASSESSMENT & PLAN NOTE
Patient presented with abdominal pain suspicious for CMV colitis. MRI of the abdomen w/o contrast at OSH shows mild small bowel mucosa thickening  consistent with enteritis. Suspicious for CMV in this immunosuppressed patient   ID consulted- Continue  on ganciclovir. Started on fluids for renal protection. Patient reported an irritable mood since GCV was started. Discussed with ID- this is not a well known adverse effect and is the only anti-CMV option in house. Likely related to anesthesia from EGD? Will continue to monitor.   -will f/u pending cultures in house  -will f/u CMV quant and endoscopy biopsies

## 2019-11-26 NOTE — ASSESSMENT & PLAN NOTE
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 Needed multiple sessions of dialysis and finally slowly recovered. Last sCr 1.4 in 07/2019. sCr at 2.3 on admission, LAURA from current abdominal pathology.  sCr currently at 2.3

## 2019-11-26 NOTE — SUBJECTIVE & OBJECTIVE
Interval History: Afebrile. Tolerated GCV although with delirium after EGD due to anesthesia. Abdominal pain persists. No N/V/D. Biopsies from grossly normal duodenum pending -- unfortunately could not reach area of enhancement on CT for samples.    Review of Systems   Constitutional: Negative for activity change, appetite change, chills, diaphoresis, fatigue, fever and unexpected weight change.   HENT: Negative for sore throat and trouble swallowing.    Eyes: Negative for visual disturbance.   Respiratory: Negative for chest tightness and shortness of breath.    Cardiovascular: Negative for chest pain and leg swelling.   Gastrointestinal: Positive for abdominal pain and nausea. Negative for abdominal distention, anal bleeding, blood in stool, constipation, diarrhea and vomiting.   Genitourinary: Negative for dysuria and hematuria.   Musculoskeletal: Negative for arthralgias and myalgias.   Skin: Negative for rash.   Neurological: Negative for dizziness, weakness, light-headedness and headaches.   Psychiatric/Behavioral: Negative for agitation and confusion.     Objective:     Vital Signs (Most Recent):  Temp: 99.3 °F (37.4 °C) (11/26/19 1721)  Pulse: 92 (11/26/19 1721)  Resp: 18 (11/26/19 1721)  BP: (!) 148/89 (11/26/19 1721)  SpO2: 99 % (11/26/19 1721) Vital Signs (24h Range):  Temp:  [98 °F (36.7 °C)-99.3 °F (37.4 °C)] 99.3 °F (37.4 °C)  Pulse:  [76-92] 92  Resp:  [14-18] 18  SpO2:  [97 %-99 %] 99 %  BP: (114-148)/(68-89) 148/89     Weight: 61.7 kg (136 lb 2.1 oz)  Body mass index is 21.97 kg/m².    Estimated Creatinine Clearance: 41.1 mL/min (A) (based on SCr of 2.3 mg/dL (H)).    Physical Exam   Constitutional: He is oriented to person, place, and time. No distress.   Appears comfortable, sitting up against the wall in hallway. Accompanied by mother.   HENT:   Head: Normocephalic and atraumatic.   Mouth/Throat: Oropharynx is clear and moist. No oropharyngeal exudate.   Eyes: Conjunctivae are normal. No scleral  icterus.   Neck: No JVD present.   Cardiovascular: Normal rate, regular rhythm, normal heart sounds and intact distal pulses.   Pulmonary/Chest: Effort normal and breath sounds normal. No respiratory distress.   Abdominal: Soft. Bowel sounds are normal. He exhibits no distension and no mass. There is tenderness. There is rebound. There is no guarding.   Soft abdomen, non - distended. Tenderness diffuse, but predominantly RUQ. No guarding. Some rebound tenderness.   Musculoskeletal: He exhibits no edema or tenderness.   Lymphadenopathy:     He has no cervical adenopathy.   Neurological: He is alert and oriented to person, place, and time.   Skin: Skin is warm. Capillary refill takes less than 2 seconds. He is not diaphoretic.   Psychiatric: He has a normal mood and affect. His behavior is normal. Judgment and thought content normal.   Nursing note and vitals reviewed.      Significant Labs:   CBC:   Recent Labs   Lab 11/25/19 0414 11/26/19  0444   WBC 5.57 4.96   HGB 7.4* 7.2*   HCT 24.0* 23.3*   * 135*     CMP:   Recent Labs   Lab 11/25/19 0414 11/26/19  0443    136   K 3.7 3.5    106   CO2 21* 20*   GLU 84 102   BUN 14 15   CREATININE 2.3* 2.3*   CALCIUM 8.3* 8.2*   PROT 7.3 7.4   ALBUMIN 2.2* 2.2*   BILITOT 0.6 0.5   ALKPHOS 182* 184*   AST 17 18   ALT 9* 10   ANIONGAP 9 10   EGFRNONAA 36.7* 36.7*       Significant Imaging: I have reviewed all pertinent imaging results/findings within the past 24 hours.

## 2019-11-26 NOTE — CONSULTS
Single lumen 18G x 8CM midline placed right basilic vein. Max dwell date 12/25/19, Lot# [id number].  Needle advanced into the vessel under real time ultrasound guidance.  Image recorded and saved.

## 2019-11-26 NOTE — ASSESSMENT & PLAN NOTE
Pt does have a history of thrombocytopenia and underwent bone marrow biopsy in 2018 thought to be from meds vs peripheral destruction. Currently at 135.

## 2019-11-26 NOTE — PROGRESS NOTES
[facility]  Infectious Disease  Progress Note    Patient Name: Jhonny Diana  MRN: [id number]  Admission Date: 11/22/2019  Length of Stay: 3 days  Attending Physician: Michelle Rowe MD  Primary Care Provider: Primary Doctor No    Isolation Status: No active isolations  Assessment/Plan:      Enteritis  30yo man w/a history of asthma and alcoholic cirrhosis (c/b HE, EV, portal HTN, and HRS; s/p DDLT 10/19/2017, CMV D+/R+, steroid induction, on maintenance sirolimus; c/b seizures, LAURA, superificial wound infection s/p wound vac closure, culture negative peritonitis due to suspected indolent biliary leakage s/p repeated sphincterotomy/biliary stent placement, subsequent loculated RLQ collection s/p perc-drainage and prolonged empiric antibiotics course, gastritis/duodenal ulcer 3/6/2018, and CMV infection 3/2018) who was admitted on 11/22/2019 as a transfer from an OSH in [facility] (close to 2wks into admission) with low grade temps (peak 100F), N/V, and RUQ abdominal pain due to enteritis of unknown etiology (seen on MRI abdomen 11/21 in distal duodenum at OSH; notable no significant ascites, fluid collection, or pancreatitis on that study; patient denies associated diarrhea or bloody stools). He is stable on empiric GCV with differential most notable for CMV (moreso than community acquired viral infection that would have run its course likely by now).    - will observe off of antibiotics given no response to prolonged course or evidence in imaging/cx of bacterial infection  - will continue induction GCV today (2.5mg/kg IV q24h for current CrCl) and observe response -- if stable and tolerating PO still tomorrow, ok to discharge on induction valcyte (dosed per CrCl) while awaiting biopsies outpatient  - will f/u CMV quant and endoscopy biopsies -- of note, biopsies were unable to be taking of enhancing distal duodenum so may not be representative if unremarkable  - if discharged tomorrow, will  request follow-up in ID clinic for review of biopsies        Anticipated Disposition: pending improvement    Thank you for your consult. I will follow-up with patient. Please contact us if you have any additional questions.     Melanie Bergman MD  Transplant ID Attending  [phone]    Melanie Bergman MD  Infectious Disease  [facility]    Subjective:     Principal Problem:Enteritis of infectious origin    HPI: No notes on file  Interval History: Afebrile. Tolerated GCV although with delirium after EGD due to anesthesia. Abdominal pain persists. No N/V/D. Biopsies from grossly normal duodenum pending -- unfortunately could not reach area of enhancement on CT for samples.    Review of Systems   Constitutional: Negative for activity change, appetite change, chills, diaphoresis, fatigue, fever and unexpected weight change.   HENT: Negative for sore throat and trouble swallowing.    Eyes: Negative for visual disturbance.   Respiratory: Negative for chest tightness and shortness of breath.    Cardiovascular: Negative for chest pain and leg swelling.   Gastrointestinal: Positive for abdominal pain and nausea. Negative for abdominal distention, anal bleeding, blood in stool, constipation, diarrhea and vomiting.   Genitourinary: Negative for dysuria and hematuria.   Musculoskeletal: Negative for arthralgias and myalgias.   Skin: Negative for rash.   Neurological: Negative for dizziness, weakness, light-headedness and headaches.   Psychiatric/Behavioral: Negative for agitation and confusion.     Objective:     Vital Signs (Most Recent):  Temp: 99.3 °F (37.4 °C) (11/26/19 1721)  Pulse: 92 (11/26/19 1721)  Resp: 18 (11/26/19 1721)  BP: (!) 148/89 (11/26/19 1721)  SpO2: 99 % (11/26/19 1721) Vital Signs (24h Range):  Temp:  [98 °F (36.7 °C)-99.3 °F (37.4 °C)] 99.3 °F (37.4 °C)  Pulse:  [76-92] 92  Resp:  [14-18] 18  SpO2:  [97 %-99 %] 99 %  BP: (114-148)/(68-89) 148/89     Weight: 61.7 kg (136 lb 2.1 oz)  Body  mass index is 21.97 kg/m².    Estimated Creatinine Clearance: 41.1 mL/min (A) (based on SCr of 2.3 mg/dL (H)).    Physical Exam   Constitutional: He is oriented to person, place, and time. No distress.   Appears comfortable, sitting up against the wall in hallway. Accompanied by mother.   HENT:   Head: Normocephalic and atraumatic.   Mouth/Throat: Oropharynx is clear and moist. No oropharyngeal exudate.   Eyes: Conjunctivae are normal. No scleral icterus.   Neck: No JVD present.   Cardiovascular: Normal rate, regular rhythm, normal heart sounds and intact distal pulses.   Pulmonary/Chest: Effort normal and breath sounds normal. No respiratory distress.   Abdominal: Soft. Bowel sounds are normal. He exhibits no distension and no mass. There is tenderness. There is rebound. There is no guarding.   Soft abdomen, non - distended. Tenderness diffuse, but predominantly RUQ. No guarding. Some rebound tenderness.   Musculoskeletal: He exhibits no edema or tenderness.   Lymphadenopathy:     He has no cervical adenopathy.   Neurological: He is alert and oriented to person, place, and time.   Skin: Skin is warm. Capillary refill takes less than 2 seconds. He is not diaphoretic.   Psychiatric: He has a normal mood and affect. His behavior is normal. Judgment and thought content normal.   Nursing note and vitals reviewed.      Significant Labs:   CBC:   Recent Labs   Lab 11/25/19 0414 11/26/19  0444   WBC 5.57 4.96   HGB 7.4* 7.2*   HCT 24.0* 23.3*   * 135*     CMP:   Recent Labs   Lab 11/25/19 0414 11/26/19  0443    136   K 3.7 3.5    106   CO2 21* 20*   GLU 84 102   BUN 14 15   CREATININE 2.3* 2.3*   CALCIUM 8.3* 8.2*   PROT 7.3 7.4   ALBUMIN 2.2* 2.2*   BILITOT 0.6 0.5   ALKPHOS 182* 184*   AST 17 18   ALT 9* 10   ANIONGAP 9 10   EGFRNONAA 36.7* 36.7*       Significant Imaging: I have reviewed all pertinent imaging results/findings within the past 24 hours.

## 2019-11-26 NOTE — PROGRESS NOTES
"[facility] Hospital Medicine  Progress Note    Patient Name: Jhonny Diana  MRN: [id number]  Patient Class: IP- Inpatient   Admission Date: 11/22/2019  Length of Stay: 3 days  Attending Physician: Michelle Rowe MD  Primary Care Provider: Primary Doctor [facility] Medicine Team: [facility] HOSP MED 1 Bucky Jose MD    Subjective:     Principal Problem:Enteritis of infectious origin        HPI:  28 yo male patient with a history of HTN and Liver transplant performed 10/19/17 secondary to acute alcoholic hepatitis with multiple post-op complications including biliary stenosis s/p multiple ERCPs, adhesions, abdominal infections and prolonged LAURA requiring dialysis, who presents today as a transfer from [place], [place] due to R sided abdominal pain and fever.  His symptoms began around 11/11 with abdominal pain located on the right side, described as a colicky-type of pain of variable intensity, ranging from 3/10 to 9/10 accompanied by fever, poor appetite, nausea and 1 episode of vomiting. For this reason he presented to [facility] on 11/18/19 where per transfer note  he was hypertensive and tachycardic. He was started on empiric zosyn.  Imaging CT non-contrast and MRI abdomen demonstrated signs of enteritis(mucosal thickening) small right pleural effusion, splenomegaly.  An U/S abdomen with comment of "possible cirrhosis". He also developed a drop in h/h needing 2 PRBC transfusions along with mild stable Cr elevation (1.8-2.1). The patient was supposed to be transferred but due to insurance problems he left [place] and was driven here by his mother.  In the ER his Tmax was 99.4 HR 88 /83 Sats 98% RA. Currently he complains of pain 6/10 located to the right side that travels to his right flank. Denies any changes in his bowel habits, no constipation or diarrhea. Denies having a paracentesis at OSH.     Overview/Hospital Course:  Patient was admitted for abdominal pain. MRI Abd w/o contrast at " OSH showed changes consistent with enteritis. ID and hepatology consulted. Patient was started on zosyn on admission. CMV pending. Started on ganciclovir with plan to undergo EGD performed and bcx was done.     Interval History: NAEO. Patient endorses an irritable mood since GCV was started. Receiving fluids with GCV for renal protection. Remained afebrile overnight. States he feels more tired and endorses lightheadedness especially when he stands from a seated position. Denies chest pain.     Review of Systems   Constitutional: Negative for activity change, appetite change, chills, diaphoresis, fatigue, fever and unexpected weight change.   HENT: Negative for congestion, sore throat and trouble swallowing.    Eyes: Negative for visual disturbance.   Respiratory: Negative for cough, chest tightness and shortness of breath.    Cardiovascular: Negative for chest pain and leg swelling.   Gastrointestinal: Positive for abdominal pain and nausea. Negative for abdominal distention, anal bleeding, blood in stool, constipation, diarrhea and vomiting.   Genitourinary: Negative for difficulty urinating, dysuria and hematuria.   Musculoskeletal: Negative for arthralgias, back pain and myalgias.   Skin: Negative for rash.   Neurological: Negative for dizziness, weakness, light-headedness and headaches.   Psychiatric/Behavioral: Negative for agitation, behavioral problems, confusion and decreased concentration.        Irritable mood     Objective:     Vital Signs (Most Recent):  Temp: 98.6 °F (37 °C) (11/26/19 0839)  Pulse: 76 (11/26/19 0839)  Resp: 18 (11/26/19 0839)  BP: 114/68 (11/26/19 0839)  SpO2: 97 % (11/26/19 0839) Vital Signs (24h Range):  Temp:  [98 °F (36.7 °C)-99.2 °F (37.3 °C)] 98.6 °F (37 °C)  Pulse:  [75-92] 76  Resp:  [13-36] 18  SpO2:  [97 %-100 %] 97 %  BP: (114-145)/(67-85) 114/68     Weight: 61.7 kg (136 lb 2.1 oz)  Body mass index is 21.97 kg/m².    Intake/Output Summary (Last 24 hours) at 11/26/2019  1200  Last data filed at 11/25/2019 1700  Gross per 24 hour   Intake 360 ml   Output --   Net 360 ml      Physical Exam   Constitutional: He is oriented to person, place, and time. He appears well-developed and well-nourished.   Appears uncomfortable    HENT:   Head: Normocephalic and atraumatic.   Mouth/Throat: Oropharynx is clear and moist.   Eyes: Conjunctivae and EOM are normal.   Neck: Normal range of motion.   Cardiovascular: Normal rate, regular rhythm, normal heart sounds and intact distal pulses.   No murmur heard.  Pulmonary/Chest: Effort normal and breath sounds normal. He has no wheezes. He has no rales.   Abdominal: Soft. Bowel sounds are normal. He exhibits no distension and no mass. There is tenderness (Diffusely tender, Mostly on RUQ. radiating to left back). A hernia (umbilical reducible hernia noted) is present.   Musculoskeletal: Normal range of motion. He exhibits no edema or tenderness.   Neurological: He is alert and oriented to person, place, and time.   Skin: Skin is warm and dry. No rash noted. No erythema.   Psychiatric: He has a normal mood and affect. His behavior is normal. Thought content normal.       Significant Labs:   Blood Culture: No results for input(s): LABBLOO in the last 48 hours.  BMP:   Recent Labs   Lab 11/26/19  0443         K 3.5      CO2 20*   BUN 15   CREATININE 2.3*   CALCIUM 8.2*     CBC:   Recent Labs   Lab 11/24/19  1406 11/25/19  0414 11/26/19  0444   WBC 6.06 5.57 4.96   HGB 7.9* 7.4* 7.2*   HCT 25.5* 24.0* 23.3*   * 120* 135*     CMP:   Recent Labs   Lab 11/25/19  0414 11/26/19  0443    136   K 3.7 3.5    106   CO2 21* 20*   GLU 84 102   BUN 14 15   CREATININE 2.3* 2.3*   CALCIUM 8.3* 8.2*   PROT 7.3 7.4   ALBUMIN 2.2* 2.2*   BILITOT 0.6 0.5   ALKPHOS 182* 184*   AST 17 18   ALT 9* 10   ANIONGAP 9 10   EGFRNONAA 36.7* 36.7*       Significant Imaging: I have reviewed all pertinent imaging results/findings within the past 24  hours.      Assessment/Plan:      * Enteritis of infectious origin  Patient presented with abdominal pain suspicious for CMV colitis. MRI of the abdomen w/o contrast at OSH shows mild small bowel mucosa thickening  consistent with enteritis. Suspicious for CMV in this immunosuppressed patient   ID consulted- Continue  on ganciclovir. Started on fluids for renal protection. Patient reported an irritable mood since GCV was started. Discussed with ID- this is not a well known adverse effect and is the only anti-CMV option in house. Likely related to anesthesia from EGD? Will continue to monitor.   -will f/u pending cultures in house  -will f/u CMV quant and endoscopy biopsies            Anemia of chronic disease  Per chart review, Hb had been stable in the 10.5-12 range for the past months. H/H dropped at OSH with no obvious source of bleeding and needed 2 tranfusions.  - Although reliability will no be optimal since he was already transfused.   Claudia, LDH, T bili wnl. Haptoglobin elevated likely due to inflammatory process  Iron studies consistent with anemia of chronic disease and bone marrow suppression from active inflammatory process  Transfuse if Hgb <7.    H/H 7.2 in light of symptoms (lightheadness, fatigue). S/p 1 unit of pRBCs       Right upper quadrant abdominal pain  28 yo male with Liver transplant in 2017 secondary to alcoholic hepatitis and multiple post-op complications including biliary duct stricture s/p multiple ERCPs w/stent placement, infections and prolonged LAURA requiring dialysis, coming for severe right sided abdominal pain with fever. Per notes, imaging from OSH CT non-contrast and MRI abdomen demonstrate signs of enteritis(mucosal thickening) small right pleural effusion and splenomegaly.      Umbilical hernia  Reducible. 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 Needed multiple sessions of dialysis and finally slowly recovered. Last sCr 1.4 in 07/2019. sCr at 2.3 on admission, LAURA from current abdominal pathology.  sCr currently at 2.3      Thrombocytopenia  Pt does have a history of thrombocytopenia and underwent bone marrow biopsy in 2018 thought to be from meds vs peripheral destruction. Currently at 135.      Alcohol use disorder, severe, in early remission  Patient states he has not taken alcohol recently.    Long-term use of immunosuppressant medication  Per records patient was on cyclosporine and due to concern for TMA he was switched to rapamune in 05/2018    -Hepatology following  -Sirolimus 1mg per hepatology recs   -daily CBC, BMP, LFT, INR, immunosuppressant trough level        S/P liver transplant  Performed in 10/19/19 secondary to alcoholic hepatitis. LFTs not concerning for rejection.         VTE Risk Mitigation (From admission, onward)         Ordered     enoxaparin injection 40 mg  Daily      11/23/19 0300                      Bucky Jose MD  Department of Hospital Medicine   [facility]

## 2019-11-27 LAB
ALBUMIN SERPL BCP-MCNC: 2.2 G/DL (ref 3.5–5.2)
ALP SERPL-CCNC: 205 U/L (ref 55–135)
ALT SERPL W/O P-5'-P-CCNC: 11 U/L (ref 10–44)
ANION GAP SERPL CALC-SCNC: 7 MMOL/L (ref 8–16)
AST SERPL-CCNC: 21 U/L (ref 10–40)
BACTERIA BLD CULT: NORMAL
BACTERIA BLD CULT: NORMAL
BASOPHILS # BLD AUTO: 0.02 K/UL (ref 0–0.2)
BASOPHILS NFR BLD: 0.5 % (ref 0–1.9)
BILIRUB SERPL-MCNC: 0.5 MG/DL (ref 0.1–1)
BUN SERPL-MCNC: 12 MG/DL (ref 6–20)
CALCIUM SERPL-MCNC: 8.3 MG/DL (ref 8.7–10.5)
CHLORIDE SERPL-SCNC: 110 MMOL/L (ref 95–110)
CMV DNA SERPL NAA+PROBE-ACNC: NORMAL IU/ML
CO2 SERPL-SCNC: 22 MMOL/L (ref 23–29)
CREAT SERPL-MCNC: 1.9 MG/DL (ref 0.5–1.4)
CRP SERPL-MCNC: 129.12 MG/L (ref 0–3.19)
DIFFERENTIAL METHOD: ABNORMAL
EBV DNA BY PCR: <100 IU/ML
EOSINOPHIL # BLD AUTO: 0.3 K/UL (ref 0–0.5)
EOSINOPHIL NFR BLD: 6.1 % (ref 0–8)
ERYTHROCYTE [DISTWIDTH] IN BLOOD BY AUTOMATED COUNT: 14.6 % (ref 11.5–14.5)
ERYTHROCYTE [SEDIMENTATION RATE] IN BLOOD BY WESTERGREN METHOD: 68 MM/HR (ref 0–23)
EST. GFR  (AFRICAN AMERICAN): 53.5 ML/MIN/1.73 M^2
EST. GFR  (NON AFRICAN AMERICAN): 46.3 ML/MIN/1.73 M^2
GLUCOSE SERPL-MCNC: 87 MG/DL (ref 70–110)
HCT VFR BLD AUTO: 25.5 % (ref 40–54)
HGB BLD-MCNC: 8.1 G/DL (ref 14–18)
IMM GRANULOCYTES # BLD AUTO: 0.02 K/UL (ref 0–0.04)
IMM GRANULOCYTES NFR BLD AUTO: 0.5 % (ref 0–0.5)
INR PPP: 1 (ref 0.8–1.2)
LACTATE SERPL-SCNC: 0.7 MMOL/L (ref 0.5–2.2)
LYMPHOCYTES # BLD AUTO: 1.2 K/UL (ref 1–4.8)
LYMPHOCYTES NFR BLD: 27.4 % (ref 18–48)
MCH RBC QN AUTO: 27.3 PG (ref 27–31)
MCHC RBC AUTO-ENTMCNC: 31.8 G/DL (ref 32–36)
MCV RBC AUTO: 86 FL (ref 82–98)
MONOCYTES # BLD AUTO: 0.4 K/UL (ref 0.3–1)
MONOCYTES NFR BLD: 10 % (ref 4–15)
NEUTROPHILS # BLD AUTO: 2.5 K/UL (ref 1.8–7.7)
NEUTROPHILS NFR BLD: 55.5 % (ref 38–73)
NRBC BLD-RTO: 0 /100 WBC
PLATELET # BLD AUTO: 125 K/UL (ref 150–350)
PMV BLD AUTO: 9.6 FL (ref 9.2–12.9)
POTASSIUM SERPL-SCNC: 3.8 MMOL/L (ref 3.5–5.1)
PROT SERPL-MCNC: 7.2 G/DL (ref 6–8.4)
PROTHROMBIN TIME: 10.6 SEC (ref 9–12.5)
RBC # BLD AUTO: 2.97 M/UL (ref 4.6–6.2)
SIROLIMUS BLD-MCNC: 9.5 NG/ML (ref 4–20)
SODIUM SERPL-SCNC: 139 MMOL/L (ref 136–145)
WBC # BLD AUTO: 4.41 K/UL (ref 3.9–12.7)

## 2019-11-27 PROCEDURE — 63600175 PHARM REV CODE 636 W HCPCS: Performed by: INTERNAL MEDICINE

## 2019-11-27 PROCEDURE — 11000001 HC ACUTE MED/SURG PRIVATE ROOM

## 2019-11-27 PROCEDURE — 63600175 PHARM REV CODE 636 W HCPCS: Performed by: STUDENT IN AN ORGANIZED HEALTH CARE EDUCATION/TRAINING PROGRAM

## 2019-11-27 PROCEDURE — 87040 BLOOD CULTURE FOR BACTERIA: CPT

## 2019-11-27 PROCEDURE — 94761 N-INVAS EAR/PLS OXIMETRY MLT: CPT

## 2019-11-27 PROCEDURE — 99222 1ST HOSP IP/OBS MODERATE 55: CPT | Mod: ,,, | Performed by: SURGERY

## 2019-11-27 PROCEDURE — 85652 RBC SED RATE AUTOMATED: CPT

## 2019-11-27 PROCEDURE — 36415 COLL VENOUS BLD VENIPUNCTURE: CPT

## 2019-11-27 PROCEDURE — 25000003 PHARM REV CODE 250: Performed by: STUDENT IN AN ORGANIZED HEALTH CARE EDUCATION/TRAINING PROGRAM

## 2019-11-27 PROCEDURE — 25000003 PHARM REV CODE 250: Performed by: INTERNAL MEDICINE

## 2019-11-27 PROCEDURE — 99233 SBSQ HOSP IP/OBS HIGH 50: CPT | Mod: ,,, | Performed by: HOSPITALIST

## 2019-11-27 PROCEDURE — 85610 PROTHROMBIN TIME: CPT

## 2019-11-27 PROCEDURE — 80195 ASSAY OF SIROLIMUS: CPT

## 2019-11-27 PROCEDURE — 99233 PR SUBSEQUENT HOSPITAL CARE,LEVL III: ICD-10-PCS | Mod: ,,, | Performed by: HOSPITALIST

## 2019-11-27 PROCEDURE — 99222 PR INITIAL HOSPITAL CARE,LEVL II: ICD-10-PCS | Mod: ,,, | Performed by: SURGERY

## 2019-11-27 PROCEDURE — 99233 SBSQ HOSP IP/OBS HIGH 50: CPT | Mod: ,,, | Performed by: INTERNAL MEDICINE

## 2019-11-27 PROCEDURE — 80053 COMPREHEN METABOLIC PANEL: CPT

## 2019-11-27 PROCEDURE — 85025 COMPLETE CBC W/AUTO DIFF WBC: CPT

## 2019-11-27 PROCEDURE — 99233 PR SUBSEQUENT HOSPITAL CARE,LEVL III: ICD-10-PCS | Mod: ,,, | Performed by: INTERNAL MEDICINE

## 2019-11-27 PROCEDURE — 83605 ASSAY OF LACTIC ACID: CPT

## 2019-11-27 PROCEDURE — 86141 C-REACTIVE PROTEIN HS: CPT

## 2019-11-27 RX ORDER — POLYETHYLENE GLYCOL 3350 17 G/17G
17 POWDER, FOR SOLUTION ORAL DAILY
Status: DISCONTINUED | OUTPATIENT
Start: 2019-11-27 | End: 2019-11-27

## 2019-11-27 RX ORDER — BISACODYL 10 MG
10 SUPPOSITORY, RECTAL RECTAL ONCE
Status: COMPLETED | OUTPATIENT
Start: 2019-11-27 | End: 2019-11-27

## 2019-11-27 RX ORDER — SYRING-NEEDL,DISP,INSUL,0.3 ML 29 G X1/2"
296 SYRINGE, EMPTY DISPOSABLE MISCELLANEOUS
Status: DISCONTINUED | OUTPATIENT
Start: 2019-11-27 | End: 2019-11-30 | Stop reason: HOSPADM

## 2019-11-27 RX ORDER — FOLIC ACID 1 MG/1
1 TABLET ORAL DAILY
Qty: 30 TABLET | Refills: 3 | Status: CANCELLED | OUTPATIENT
Start: 2019-11-27

## 2019-11-27 RX ORDER — BISACODYL 10 MG
10 SUPPOSITORY, RECTAL RECTAL DAILY PRN
Status: DISCONTINUED | OUTPATIENT
Start: 2019-11-27 | End: 2019-11-30 | Stop reason: HOSPADM

## 2019-11-27 RX ORDER — VALGANCICLOVIR 450 MG/1
450 TABLET, FILM COATED ORAL 2 TIMES DAILY
Qty: 38 TABLET | Refills: 0 | Status: CANCELLED | OUTPATIENT
Start: 2019-11-27 | End: 2019-12-16

## 2019-11-27 RX ORDER — HYDROCODONE BITARTRATE AND ACETAMINOPHEN 10; 325 MG/1; MG/1
1 TABLET ORAL EVERY 4 HOURS PRN
Qty: 10 TABLET | Refills: 0 | Status: CANCELLED | OUTPATIENT
Start: 2019-11-27

## 2019-11-27 RX ORDER — POLYETHYLENE GLYCOL 3350 17 G/17G
17 POWDER, FOR SOLUTION ORAL 3 TIMES DAILY
Status: DISCONTINUED | OUTPATIENT
Start: 2019-11-27 | End: 2019-11-29

## 2019-11-27 RX ORDER — PANTOPRAZOLE SODIUM 40 MG/1
40 TABLET, DELAYED RELEASE ORAL DAILY
Qty: 30 TABLET | Refills: 0 | Status: CANCELLED | OUTPATIENT
Start: 2019-11-27

## 2019-11-27 RX ADMIN — HYDROMORPHONE HYDROCHLORIDE 0.5 MG: 1 INJECTION, SOLUTION INTRAMUSCULAR; INTRAVENOUS; SUBCUTANEOUS at 11:11

## 2019-11-27 RX ADMIN — PROMETHAZINE HYDROCHLORIDE 12.5 MG: 25 INJECTION INTRAMUSCULAR; INTRAVENOUS at 05:11

## 2019-11-27 RX ADMIN — PANTOPRAZOLE SODIUM 40 MG: 40 TABLET, DELAYED RELEASE ORAL at 08:11

## 2019-11-27 RX ADMIN — HYDROCODONE BITARTRATE AND ACETAMINOPHEN 1 TABLET: 10; 325 TABLET ORAL at 12:11

## 2019-11-27 RX ADMIN — Medication 1 CAPSULE: at 03:11

## 2019-11-27 RX ADMIN — GANCICLOVIR SODIUM 155 MG: 500 INJECTION, POWDER, LYOPHILIZED, FOR SOLUTION INTRAVENOUS at 03:11

## 2019-11-27 RX ADMIN — HYDROMORPHONE HYDROCHLORIDE 0.5 MG: 1 INJECTION, SOLUTION INTRAMUSCULAR; INTRAVENOUS; SUBCUTANEOUS at 05:11

## 2019-11-27 RX ADMIN — BISACODYL 10 MG: 10 SUPPOSITORY RECTAL at 06:11

## 2019-11-27 RX ADMIN — PIPERACILLIN AND TAZOBACTAM 4.5 G: 4; .5 INJECTION, POWDER, FOR SOLUTION INTRAVENOUS at 08:11

## 2019-11-27 RX ADMIN — POLYETHYLENE GLYCOL 3350 17 G: 17 POWDER, FOR SOLUTION ORAL at 08:11

## 2019-11-27 RX ADMIN — PIPERACILLIN AND TAZOBACTAM 4.5 G: 4; .5 INJECTION, POWDER, FOR SOLUTION INTRAVENOUS at 11:11

## 2019-11-27 RX ADMIN — SODIUM CHLORIDE, SODIUM LACTATE, POTASSIUM CHLORIDE, AND CALCIUM CHLORIDE: .6; .31; .03; .02 INJECTION, SOLUTION INTRAVENOUS at 10:11

## 2019-11-27 RX ADMIN — HYDROCODONE BITARTRATE AND ACETAMINOPHEN 1 TABLET: 10; 325 TABLET ORAL at 08:11

## 2019-11-27 RX ADMIN — HYDROMORPHONE HYDROCHLORIDE 0.5 MG: 1 INJECTION, SOLUTION INTRAMUSCULAR; INTRAVENOUS; SUBCUTANEOUS at 06:11

## 2019-11-27 RX ADMIN — HYDROMORPHONE HYDROCHLORIDE 0.5 MG: 1 INJECTION, SOLUTION INTRAMUSCULAR; INTRAVENOUS; SUBCUTANEOUS at 10:11

## 2019-11-27 RX ADMIN — SIROLIMUS 0.5 MG: 0.5 TABLET, SUGAR COATED ORAL at 10:11

## 2019-11-27 RX ADMIN — HYDROCODONE BITARTRATE AND ACETAMINOPHEN 1 TABLET: 10; 325 TABLET ORAL at 04:11

## 2019-11-27 RX ADMIN — FOLIC ACID 1 MG: 1 TABLET ORAL at 08:11

## 2019-11-27 RX ADMIN — POLYETHYLENE GLYCOL 3350 17 G: 17 POWDER, FOR SOLUTION ORAL at 06:11

## 2019-11-27 RX ADMIN — SODIUM CHLORIDE, SODIUM LACTATE, POTASSIUM CHLORIDE, AND CALCIUM CHLORIDE: .6; .31; .03; .02 INJECTION, SOLUTION INTRAVENOUS at 09:11

## 2019-11-27 RX ADMIN — HYDROCODONE BITARTRATE AND ACETAMINOPHEN 1 TABLET: 10; 325 TABLET ORAL at 09:11

## 2019-11-27 NOTE — ASSESSMENT & PLAN NOTE
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 Needed multiple sessions of dialysis and finally slowly recovered. Last sCr 1.4 in 07/2019. sCr at 2.3 on admission, LAURA from current abdominal pathology.  Improving.  sCr currently at 1.9

## 2019-11-27 NOTE — PROGRESS NOTES
[facility]  Infectious Disease  Progress Note    Patient Name: Jhonny Diana  MRN: [id number]  Admission Date: 11/22/2019  Length of Stay: 4 days  Attending Physician: Michelle Rowe MD  Primary Care Provider: Primary Doctor No    Isolation Status: No active isolations  Assessment/Plan:      Enteritis  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, CMV D+/R+, steroid induction, on maintenance sirolimus; c/b seizures, LAURA, superificial wound infection s/p wound vac closure, culture negative peritonitis due to suspected indolent biliary leakage s/p repeated sphincterotomy/biliary stent placement, subsequent loculated RLQ collection s/p perc-drainage and prolonged empiric antibiotics course, gastritis/duodenal ulcer 3/6/2018, and CMV infection 3/2018) who was admitted on 11/22/2019 as a transfer from an OSH in [facility] (close to 2wks into admission) with low grade temps (peak 100F), N/V, and RUQ abdominal pain due to enteritis of unknown etiology (seen on MRI abdomen 11/21 in distal duodenum at OSH; notable no significant ascites, fluid collection, or pancreatitis on that study; patient denies associated diarrhea or bloody stools). He is stable on empiric GCV with differential most notable for CMV (moreso than community acquired viral infection that would have run its course likely by now) vs ischemic insult vs another OI (PTLD among others).    - will observe off of antibiotics given no response to prolonged course or evidence in imaging/cx of bacterial infection  - will continue induction GCV (2.5mg/kg IV q24h for current CrCl) and observe response over next few days while awaiting suboptimal biopsy results  - will f/u CMV quant and endoscopy biopsies -- of note, biopsies were unable to be taking of enhancing distal duodenum so may not be representative if unremarkable  - agree with repeat CT A/P given acute change and mesenteric  doppler  - await pending EBV quant and if has diarrhea would send GI stool pathogens panel        Anticipated Disposition: pending improvement    Thank you for your consult. I will follow-up with patient. Please contact us if you have any additional questions.     Melanie Bergman MD  Transplant ID Attending  [phone]    Melanie Bergman MD  Infectious Disease  [facility]    Subjective:     Principal Problem:Enteritis of infectious origin    HPI: No notes on file  Interval History: Febrile overnight with worsening RUQ abdominal pain today. No N/V/D. Plan stat CT A/P and mesenteric doppler to assess duodenal blood flow. Biopsies from grossly normal duodenum still pending.    Review of Systems   Constitutional: Negative for activity change, appetite change, chills, diaphoresis, fatigue, fever and unexpected weight change.   HENT: Negative for sore throat and trouble swallowing.    Eyes: Negative for visual disturbance.   Respiratory: Negative for chest tightness and shortness of breath.    Cardiovascular: Negative for chest pain and leg swelling.   Gastrointestinal: Positive for abdominal pain and nausea. Negative for abdominal distention, anal bleeding, blood in stool, constipation, diarrhea and vomiting.   Genitourinary: Negative for dysuria and hematuria.   Musculoskeletal: Negative for arthralgias and myalgias.   Skin: Negative for rash.   Neurological: Negative for dizziness, weakness, light-headedness and headaches.   Psychiatric/Behavioral: Negative for agitation and confusion.     Objective:     Vital Signs (Most Recent):  Temp: 98.5 °F (36.9 °C) (11/27/19 1617)  Pulse: 83 (11/27/19 1617)  Resp: 18 (11/27/19 1617)  BP: (!) 154/87 (11/27/19 1617)  SpO2: 100 % (11/27/19 1617) Vital Signs (24h Range):  Temp:  [98.4 °F (36.9 °C)-101.2 °F (38.4 °C)] 98.5 °F (36.9 °C)  Pulse:  [82-99] 83  Resp:  [16-18] 18  SpO2:  [97 %-100 %] 100 %  BP: (137-159)/(78-90) 154/87     Weight: 61.7 kg (136 lb 2.1  oz)  Body mass index is 21.97 kg/m².    Estimated Creatinine Clearance: 49.7 mL/min (A) (based on SCr of 1.9 mg/dL (H)).    Physical Exam   Constitutional: He is oriented to person, place, and time. No distress.   Appears comfortable, sitting up against the wall in hallway. Accompanied by mother.   HENT:   Head: Normocephalic and atraumatic.   Mouth/Throat: Oropharynx is clear and moist. No oropharyngeal exudate.   Eyes: Conjunctivae are normal. No scleral icterus.   Neck: No JVD present.   Cardiovascular: Normal rate, regular rhythm, normal heart sounds and intact distal pulses.   Pulmonary/Chest: Effort normal and breath sounds normal. No respiratory distress.   Abdominal: Soft. Bowel sounds are normal. He exhibits no distension and no mass. There is tenderness. There is rebound. There is no guarding.   Soft abdomen, non - distended. Tenderness diffuse, but predominantly RUQ. No guarding. Some rebound tenderness.   Musculoskeletal: He exhibits no edema or tenderness.   Lymphadenopathy:     He has no cervical adenopathy.   Neurological: He is alert and oriented to person, place, and time.   Skin: Skin is warm. Capillary refill takes less than 2 seconds. He is not diaphoretic.   Psychiatric: He has a normal mood and affect. His behavior is normal. Judgment and thought content normal.   Nursing note and vitals reviewed.      Significant Labs:   CBC:   Recent Labs   Lab 11/26/19  0444 11/27/19  0505   WBC 4.96 4.41   HGB 7.2* 8.1*   HCT 23.3* 25.5*   * 125*     CMP:   Recent Labs   Lab 11/26/19  0443 11/27/19  0505    139   K 3.5 3.8    110   CO2 20* 22*    87   BUN 15 12   CREATININE 2.3* 1.9*   CALCIUM 8.2* 8.3*   PROT 7.4 7.2   ALBUMIN 2.2* 2.2*   BILITOT 0.5 0.5   ALKPHOS 184* 205*   AST 18 21   ALT 10 11   ANIONGAP 10 7*   EGFRNONAA 36.7* 46.3*       Significant Imaging: I have reviewed all pertinent imaging results/findings within the past 24 hours.

## 2019-11-27 NOTE — SUBJECTIVE & OBJECTIVE
Interval History: Febrile overnight with worsening RUQ abdominal pain today. No N/V/D. Plan stat CT A/P and mesenteric doppler to assess duodenal blood flow. Biopsies from grossly normal duodenum still pending.    Review of Systems   Constitutional: Negative for activity change, appetite change, chills, diaphoresis, fatigue, fever and unexpected weight change.   HENT: Negative for sore throat and trouble swallowing.    Eyes: Negative for visual disturbance.   Respiratory: Negative for chest tightness and shortness of breath.    Cardiovascular: Negative for chest pain and leg swelling.   Gastrointestinal: Positive for abdominal pain and nausea. Negative for abdominal distention, anal bleeding, blood in stool, constipation, diarrhea and vomiting.   Genitourinary: Negative for dysuria and hematuria.   Musculoskeletal: Negative for arthralgias and myalgias.   Skin: Negative for rash.   Neurological: Negative for dizziness, weakness, light-headedness and headaches.   Psychiatric/Behavioral: Negative for agitation and confusion.     Objective:     Vital Signs (Most Recent):  Temp: 98.5 °F (36.9 °C) (11/27/19 1617)  Pulse: 83 (11/27/19 1617)  Resp: 18 (11/27/19 1617)  BP: (!) 154/87 (11/27/19 1617)  SpO2: 100 % (11/27/19 1617) Vital Signs (24h Range):  Temp:  [98.4 °F (36.9 °C)-101.2 °F (38.4 °C)] 98.5 °F (36.9 °C)  Pulse:  [82-99] 83  Resp:  [16-18] 18  SpO2:  [97 %-100 %] 100 %  BP: (137-159)/(78-90) 154/87     Weight: 61.7 kg (136 lb 2.1 oz)  Body mass index is 21.97 kg/m².    Estimated Creatinine Clearance: 49.7 mL/min (A) (based on SCr of 1.9 mg/dL (H)).    Physical Exam   Constitutional: He is oriented to person, place, and time. No distress.   Appears comfortable, sitting up against the wall in hallway. Accompanied by mother.   HENT:   Head: Normocephalic and atraumatic.   Mouth/Throat: Oropharynx is clear and moist. No oropharyngeal exudate.   Eyes: Conjunctivae are normal. No scleral icterus.   Neck: No JVD  present.   Cardiovascular: Normal rate, regular rhythm, normal heart sounds and intact distal pulses.   Pulmonary/Chest: Effort normal and breath sounds normal. No respiratory distress.   Abdominal: Soft. Bowel sounds are normal. He exhibits no distension and no mass. There is tenderness. There is rebound. There is no guarding.   Soft abdomen, non - distended. Tenderness diffuse, but predominantly RUQ. No guarding. Some rebound tenderness.   Musculoskeletal: He exhibits no edema or tenderness.   Lymphadenopathy:     He has no cervical adenopathy.   Neurological: He is alert and oriented to person, place, and time.   Skin: Skin is warm. Capillary refill takes less than 2 seconds. He is not diaphoretic.   Psychiatric: He has a normal mood and affect. His behavior is normal. Judgment and thought content normal.   Nursing note and vitals reviewed.      Significant Labs:   CBC:   Recent Labs   Lab 11/26/19  0444 11/27/19  0505   WBC 4.96 4.41   HGB 7.2* 8.1*   HCT 23.3* 25.5*   * 125*     CMP:   Recent Labs   Lab 11/26/19  0443 11/27/19  0505    139   K 3.5 3.8    110   CO2 20* 22*    87   BUN 15 12   CREATININE 2.3* 1.9*   CALCIUM 8.2* 8.3*   PROT 7.4 7.2   ALBUMIN 2.2* 2.2*   BILITOT 0.5 0.5   ALKPHOS 184* 205*   AST 18 21   ALT 10 11   ANIONGAP 10 7*   EGFRNONAA 36.7* 46.3*       Significant Imaging: I have reviewed all pertinent imaging results/findings within the past 24 hours.

## 2019-11-27 NOTE — PLAN OF CARE
Problem: Adult Inpatient Plan of Care  Goal: Plan of Care Review  Outcome: Ongoing, Progressing  Goal: Patient-Specific Goal (Individualization)  Outcome: Ongoing, Progressing  Goal: Absence of Hospital-Acquired Illness or Injury  Outcome: Ongoing, Progressing  Goal: Optimal Comfort and Wellbeing  Outcome: Ongoing, Progressing  Goal: Readiness for Transition of Care  Outcome: Ongoing, Progressing  Goal: Rounds/Family Conference  Outcome: Ongoing, Progressing     Problem: Infection  Goal: Infection Symptom Resolution  Outcome: Ongoing, Progressing     Problem: Fall Injury Risk  Goal: Absence of Fall and Fall-Related Injury  Outcome: Ongoing, Progressing     Problem: Pain Chronic (Persistent)  Goal: Acceptable Pain Control and Functional Ability  Outcome: Ongoing, Progressing     Problem: Nausea and Vomiting  Goal: Fluid and Electrolyte Balance  Outcome: Ongoing, Progressing

## 2019-11-27 NOTE — ASSESSMENT & PLAN NOTE
Per chart review, Hb had been stable in the 10.5-12 range for the past months. H/H dropped at OSH with no obvious source of bleeding and needed 2 tranfusions.  - Although reliability will no be optimal since he was already transfused.   Claudia, LDH, T bili wnl. Haptoglobin elevated likely due to inflammatory process  Iron studies consistent with anemia of chronic disease and bone marrow suppression from active inflammatory process  Transfuse if Hgb <7.    H/H 7.2 in light of symptoms (lightheadness, fatigue). S/p 1 unit of pRBCs, repeat H/H 8.1/25.5.

## 2019-11-27 NOTE — TREATMENT PLAN
Hepatology Treatment Plan    Jhonny Diana is a 29 y.o. male admitted to hospital 11/22/2019 (Hospital Day: 6) due to Enteritis of infectious origin. Hepatology following for immunosuppression management.    Lab Results   Component Value Date    TACROLIMUS <1.5 (L) 03/04/2018    TACROLIMUS 1.6 (L) 03/03/2018    TACROLIMUS 3.2 (L) 03/02/2018    TACROLIMUS 3.2 (L) 03/02/2018    TACROLIMUS 3.2 (L) 03/02/2018       Lab Results   Component Value Date    CREATININE 1.9 (H) 11/27/2019    CREATININE 2.3 (H) 11/26/2019    CREATININE 2.3 (H) 11/25/2019       Lab Results   Component Value Date    ALT 11 11/27/2019    AST 21 11/27/2019     (H) 10/26/2017    ALKPHOS 205 (H) 11/27/2019    BILITOT 0.5 11/27/2019    WBC 4.41 11/27/2019    HGB 8.1 (L) 11/27/2019     (L) 11/27/2019       Kidney function is improving  Liver enzymes are stable    Plan  - Continue immunosuppression regimen without changes    Immunosuppression Regimen:  Sirolimus: 0.5mg daily    Please notify hepatology on day of discharge to discuss discharge medications and follow up.     Please obtain daily CBC, BMP, LFT, INR, immunosuppressant trough level    Thank you for involving us in the care of Jhonny Diana. Please call with any additional concerns or questions.    Aneudy Barrios MD  Gastroenterology Fellow

## 2019-11-27 NOTE — CONSULTS
[facility]  General Surgery  Consult Note    Consults  Subjective:     Chief Complaint/Reason for Admission: Abdominal pain    History of Present Illness: Jhonny Diana is a 29 y.o. male with a hx of liver transplant who presented to OSH and then was transferred to [facility] for abdominal pain. Patient reports some prolonged nausea with one bout of emesis prior to arrival, none since. Patient has had some loose stools. Imaging has demonstrated evidence of enteritis. Patient reports mild diffuse abdominal pain which is more prominent over RUQ.     No current facility-administered medications on file prior to encounter.      Current Outpatient Medications on File Prior to Encounter   Medication Sig    acetaminophen (TYLENOL ORAL) Take 1 tablet by mouth daily as needed (for fever).    bisacodyl (DULCOLAX) 5 mg EC tablet Take 5 mg by mouth daily as needed for Constipation.    famotidine (PEPCID) 10 MG tablet Take 10 mg by mouth daily as needed for Heartburn (sour stomach).    sirolimus (RAPAMUNE) 1 MG Tab Take 3 tablets (3 mg total) by mouth once daily. (Patient taking differently: Take 2 mg by mouth once daily. )       Review of patient's allergies indicates:   Allergen Reactions    Bactrim [sulfamethoxazole-trimethoprim] Other (See Comments)     Mookie Trell Syndrome       Past Medical History:   Diagnosis Date    Abdominal pain 5/27/2018    Alcoholic cirrhosis 8/14/2018    Alcoholic hepatitis with ascites     Anemia     CKD (chronic kidney disease) stage 3, GFR 30-59 ml/min 8/14/2018    Encounter for blood transfusion     multiple blood transfusions    ESRD on dialysis     History of hematemesis 9/28/2017    Hypertension     Seizures     Substance abuse     alcohol and opioids    Thrombocytopenia     Transplanted liver     10/2017     Past Surgical History:   Procedure Laterality Date    APPENDECTOMY      CENTRAL VENOUS CATHETER TUNNELED INSERTION DOUBLE LUMEN      inserted and  removed    ERCP      ERCP N/A 2018    Procedure: ERCP;  Surgeon: Solis Villanueva MD;  Location: [facility] (2ND FLR);  Service: Endoscopy;  Laterality: N/A;  dialysis/cirrhosis/labs prior to ERCP/svn  platelet    ESOPHAGOGASTRODUODENOSCOPY      ESOPHAGOGASTRODUODENOSCOPY N/A 2019    Procedure: EGD (ESOPHAGOGASTRODUODENOSCOPY);  Surgeon: Sheri Siddiqui MD;  [facility]  Service: Endoscopy;  Laterality: N/A;    LIVER TRANSPLANT      10/2017     Family History     Problem Relation (Age of Onset)    Alcohol abuse Father    Cancer Father    No Known Problems Mother, Sister, Brother        Tobacco Use    Smoking status: Former Smoker     Packs/day: 1.00     Years: 10.00     Pack years: 10.00     Types: Cigarettes     Last attempt to quit: 2017     Years since quittin.7    Smokeless tobacco: Never Used   Substance and Sexual Activity    Alcohol use: No     Comment: a fifth of liquor daily for years, cut back over last 2 months    Drug use: No    Sexual activity: Not on file     Comment: single     Review of Systems   Constitutional: Positive for fever.   Gastrointestinal: Positive for abdominal pain.   All other systems reviewed and are negative.    Objective:     Vital Signs (Most Recent):  Temp: 98.5 °F (36.9 °C) (19)  Pulse: 83 (19)  Resp: 18 (19)  BP: (!) 154/87 (19)  SpO2: 100 % (19) Vital Signs (24h Range):  Temp:  [98.4 °F (36.9 °C)-101.2 °F (38.4 °C)] 98.5 °F (36.9 °C)  Pulse:  [82-99] 83  Resp:  [16-18] 18  SpO2:  [97 %-100 %] 100 %  BP: (137-159)/(78-90) 154/87     Weight: 61.7 kg (136 lb 2.1 oz)  Body mass index is 21.97 kg/m².      Intake/Output Summary (Last 24 hours) at 2019 2969  Last data filed at 2019  Gross per 24 hour   Intake 358.75 ml   Output --   Net 358.75 ml       Physical Exam   Constitutional: He is oriented to person, place, and time. He appears well-developed and  well-nourished. No distress.   Eyes: EOM are normal.   Cardiovascular: Normal rate.   Pulmonary/Chest: Effort normal. No respiratory distress.   Abdominal: Soft. He exhibits no distension. There is tenderness (mild RUQ tenderness, not peritoneal). There is no guarding. A hernia (large easily reducible umbilical hernia) is present.   Neurological: He is alert and oriented to person, place, and time. No cranial nerve deficit.   Skin: He is not diaphoretic.   Psychiatric: He has a normal mood and affect. His behavior is normal.       Significant Labs:  CBC:   Recent Labs   Lab 11/27/19  0505   WBC 4.41   RBC 2.97*   HGB 8.1*   HCT 25.5*   *   MCV 86   MCH 27.3   MCHC 31.8*     CMP:   Recent Labs   Lab 11/27/19  0505   GLU 87   CALCIUM 8.3*   ALBUMIN 2.2*   PROT 7.2      K 3.8   CO2 22*      BUN 12   CREATININE 1.9*   ALKPHOS 205*   ALT 11   AST 21   BILITOT 0.5     Lactic Acid:   Recent Labs   Lab 11/27/19  1158   LACTATE 0.7       Significant Diagnostics:  I have reviewed all pertinent imaging results/findings within the past 24 hours.    Assessment/Plan:     Active Diagnoses:    Diagnosis Date Noted POA    PRINCIPAL PROBLEM:  Enteritis of infectious origin [A09] 11/23/2019 Yes    LAURA (acute kidney injury) [N17.9] 11/24/2019 Yes    Weakness [R53.1] 11/23/2019 Yes    Right upper quadrant abdominal pain [R10.11] 11/23/2019 Yes    Anemia of chronic disease [D63.8] 11/23/2019 Yes    History of opioid abuse [F11.11] 11/23/2019 Yes    Enteritis [K52.9] 11/22/2019 Yes    Umbilical hernia [K42.9] 04/12/2019 Yes    Acute kidney injury superimposed on CKD [N17.9, N18.9] 08/14/2018 Yes    Thrombocytopenia [D69.6] 05/12/2018 Yes    Anemia of chronic renal failure [N18.9, D63.1]  Yes    Alcohol use disorder, severe, in early remission [F10.21] 04/23/2018 Yes    Constipation [K59.00] 01/20/2018 Yes    S/P liver transplant [Z94.4] 10/20/2017 Not Applicable     Chronic    Long-term use of  immunosuppressant medication [Z79.899] 10/20/2017 Not Applicable     Chronic      Problems Resolved During this Admission:     Jhonny Diana is a 29 y.o. male with abdominal pain    No physical exam findings or imaging findings concerning for surgical intervention needs  Continue management per primary team and currently consulted service  Patient not obstructed  Will sign off    Thank you for your consult. I will sign off. Please contact us if you have any additional questions.    Imer Andrade MD  General Surgery  [facility]

## 2019-11-27 NOTE — ASSESSMENT & PLAN NOTE
Per records patient was on cyclosporine and due to concern for TMA he was switched to rapamune in 05/2018    -Hepatology following  -Sirolimus dosages per hepatology recs   -daily CBC, BMP, LFT, INR, immunosuppressant trough level

## 2019-11-27 NOTE — SUBJECTIVE & OBJECTIVE
Interval History: Patient spiked fevers s/p 1 unit of pRBC transfusion, Tmax of 101.2. Prior to that T noted to be climbing up in 100s. Patient received tylenol which resolved fever. Patient complains of worsening abdominal pain today mainly located in RUQ with new pain in right lower quadrant. Endorses 2 bouts of diarrhea which he attributes to antibiotics as he often gets diarrhea from abx in the past. States his mood is still irritable but has improved from yesterday. Restarted zosyn.     Review of Systems   Constitutional: Positive for activity change. Negative for appetite change, chills and fever.   Respiratory: Negative for apnea, cough, chest tightness, shortness of breath and wheezing.    Cardiovascular: Negative for chest pain, palpitations and leg swelling.   Gastrointestinal: Positive for abdominal pain and diarrhea. Negative for abdominal distention.   Genitourinary: Negative for difficulty urinating and dysuria.   Musculoskeletal: Negative for arthralgias, back pain and myalgias.   Skin: Negative for color change, pallor and rash.   Neurological: Negative for dizziness, tremors, weakness, light-headedness and headaches.   Psychiatric/Behavioral: Negative for agitation, behavioral problems and confusion.     Objective:     Vital Signs (Most Recent):  Temp: 98.4 °F (36.9 °C) (11/27/19 1143)  Pulse: 88 (11/27/19 1143)  Resp: 18 (11/27/19 1143)  BP: (!) 140/80 (11/27/19 1143)  SpO2: 97 % (11/27/19 1143) Vital Signs (24h Range):  Temp:  [98.4 °F (36.9 °C)-101.2 °F (38.4 °C)] 98.4 °F (36.9 °C)  Pulse:  [82-99] 88  Resp:  [16-18] 18  SpO2:  [97 %-99 %] 97 %  BP: (137-159)/(78-90) 140/80     Weight: 61.7 kg (136 lb 2.1 oz)  Body mass index is 21.97 kg/m².    Intake/Output Summary (Last 24 hours) at 11/27/2019 1228  Last data filed at 11/26/2019 2015  Gross per 24 hour   Intake 858.75 ml   Output --   Net 858.75 ml      Physical Exam   Constitutional: He is oriented to person, place, and time. He appears  well-developed and well-nourished.   Appears uncomfortable    HENT:   Head: Normocephalic and atraumatic.   Mouth/Throat: Oropharynx is clear and moist.   Eyes: Conjunctivae and EOM are normal.   Neck: Normal range of motion.   Cardiovascular: Normal rate, regular rhythm, normal heart sounds and intact distal pulses.   No murmur heard.  Pulmonary/Chest: Effort normal and breath sounds normal. He has no wheezes. He has no rales.   Abdominal: Soft. Bowel sounds are normal. He exhibits no distension and no mass. There is tenderness (Increasing tenderness to RUQ). A hernia (umbilical reducible hernia noted) is present.   Musculoskeletal: Normal range of motion. He exhibits no edema or tenderness.   Neurological: He is alert and oriented to person, place, and time.   Skin: Skin is warm and dry. No rash noted. No erythema.   Psychiatric: He has a normal mood and affect. His behavior is normal. Thought content normal.       Significant Labs:   BMP:   Recent Labs   Lab 11/27/19  0505   GLU 87      K 3.8      CO2 22*   BUN 12   CREATININE 1.9*   CALCIUM 8.3*     CBC:   Recent Labs   Lab 11/26/19  0444 11/27/19  0505   WBC 4.96 4.41   HGB 7.2* 8.1*   HCT 23.3* 25.5*   * 125*     CMP:   Recent Labs   Lab 11/26/19  0443 11/27/19  0505    139   K 3.5 3.8    110   CO2 20* 22*    87   BUN 15 12   CREATININE 2.3* 1.9*   CALCIUM 8.2* 8.3*   PROT 7.4 7.2   ALBUMIN 2.2* 2.2*   BILITOT 0.5 0.5   ALKPHOS 184* 205*   AST 18 21   ALT 10 11   ANIONGAP 10 7*   EGFRNONAA 36.7* 46.3*       Significant Imaging: I have reviewed all pertinent imaging results/findings within the past 24 hours.

## 2019-11-27 NOTE — PROGRESS NOTES
"[facility] Medicine  Progress Note    Patient Name: Jhonny Diana  MRN: [id number]  Patient Class: IP- Inpatient   Admission Date: 11/22/2019  Length of Stay: 4 days  Attending Physician: Michelle Rowe MD  Primary Care Provider: [facility] Medicine Team: [facility] [place] [place] HOSP MED 1 Bucky Jose MD    Subjective:     Principal Problem:Enteritis of infectious origin        HPI:  28 yo male patient with a history of HTN and Liver transplant performed 10/19/17 secondary to acute alcoholic hepatitis with multiple post-op complications including biliary stenosis s/p multiple ERCPs, adhesions, abdominal infections and prolonged LAURA requiring dialysis, who presents today as a transfer from [place], [place] due to R sided abdominal pain and fever.  His symptoms began around 11/11 with abdominal pain located on the right side, described as a colicky-type of pain of variable intensity, ranging from 3/10 to 9/10 accompanied by fever, poor appetite, nausea and 1 episode of vomiting. For this reason he presented to [facility] on 11/18/19 where per transfer note  he was hypertensive and tachycardic. He was started on empiric zosyn.  Imaging CT non-contrast and MRI abdomen demonstrated signs of enteritis(mucosal thickening) small right pleural effusion, splenomegaly.  An U/S abdomen with comment of "possible cirrhosis". He also developed a drop in h/h needing 2 PRBC transfusions along with mild stable Cr elevation (1.8-2.1). The patient was supposed to be transferred but due to insurance problems he left [place] and was driven here by his mother.  In the ER his Tmax was 99.4 HR 88 /83 Sats 98% RA. Currently he complains of pain 6/10 located to the right side that travels to his right flank. Denies any changes in his bowel habits, no constipation or diarrhea. Denies having a paracentesis at OSH.     Overview/Hospital Course:  Patient was admitted for abdominal pain. MRI Abd w/o contrast at " OSH showed changes consistent with enteritis. ID and hepatology consulted. Patient was started on zosyn on admission. CMV pending. Started on ganciclovir with plan to undergo EGD performed and bcx was done.     Interval History: Patient spiked fevers s/p 1 unit of pRBC transfusion, Tmax of 101.2. Prior to that T noted to be climbing up in 100s. Patient received tylenol which resolved fever. Patient complains of worsening abdominal pain today mainly located in RUQ with new pain in right lower quadrant. Endorses 2 bouts of diarrhea which he attributes to antibiotics as he often gets diarrhea from abx in the past. States his mood is still irritable but has improved from yesterday. Restarted zosyn.     Review of Systems   Constitutional: Positive for activity change. Negative for appetite change, chills and fever.   Respiratory: Negative for apnea, cough, chest tightness, shortness of breath and wheezing.    Cardiovascular: Negative for chest pain, palpitations and leg swelling.   Gastrointestinal: Positive for abdominal pain and diarrhea. Negative for abdominal distention.   Genitourinary: Negative for difficulty urinating and dysuria.   Musculoskeletal: Negative for arthralgias, back pain and myalgias.   Skin: Negative for color change, pallor and rash.   Neurological: Negative for dizziness, tremors, weakness, light-headedness and headaches.   Psychiatric/Behavioral: Negative for agitation, behavioral problems and confusion.     Objective:     Vital Signs (Most Recent):  Temp: 98.4 °F (36.9 °C) (11/27/19 1143)  Pulse: 88 (11/27/19 1143)  Resp: 18 (11/27/19 1143)  BP: (!) 140/80 (11/27/19 1143)  SpO2: 97 % (11/27/19 1143) Vital Signs (24h Range):  Temp:  [98.4 °F (36.9 °C)-101.2 °F (38.4 °C)] 98.4 °F (36.9 °C)  Pulse:  [82-99] 88  Resp:  [16-18] 18  SpO2:  [97 %-99 %] 97 %  BP: (137-159)/(78-90) 140/80     Weight: 61.7 kg (136 lb 2.1 oz)  Body mass index is 21.97 kg/m².    Intake/Output Summary (Last 24 hours) at  11/27/2019 1228  Last data filed at 11/26/2019 2015  Gross per 24 hour   Intake 858.75 ml   Output --   Net 858.75 ml      Physical Exam   Constitutional: He is oriented to person, place, and time. He appears well-developed and well-nourished.   Appears uncomfortable    HENT:   Head: Normocephalic and atraumatic.   Mouth/Throat: Oropharynx is clear and moist.   Eyes: Conjunctivae and EOM are normal.   Neck: Normal range of motion.   Cardiovascular: Normal rate, regular rhythm, normal heart sounds and intact distal pulses.   No murmur heard.  Pulmonary/Chest: Effort normal and breath sounds normal. He has no wheezes. He has no rales.   Abdominal: Soft. Bowel sounds are normal. He exhibits no distension and no mass. There is tenderness (Increasing tenderness to RUQ). A hernia (umbilical reducible hernia noted) is present.   Musculoskeletal: Normal range of motion. He exhibits no edema or tenderness.   Neurological: He is alert and oriented to person, place, and time.   Skin: Skin is warm and dry. No rash noted. No erythema.   Psychiatric: He has a normal mood and affect. His behavior is normal. Thought content normal.       Significant Labs:   BMP:   Recent Labs   Lab 11/27/19  0505   GLU 87      K 3.8      CO2 22*   BUN 12   CREATININE 1.9*   CALCIUM 8.3*     CBC:   Recent Labs   Lab 11/26/19  0444 11/27/19  0505   WBC 4.96 4.41   HGB 7.2* 8.1*   HCT 23.3* 25.5*   * 125*     CMP:   Recent Labs   Lab 11/26/19  0443 11/27/19  0505    139   K 3.5 3.8    110   CO2 20* 22*    87   BUN 15 12   CREATININE 2.3* 1.9*   CALCIUM 8.2* 8.3*   PROT 7.4 7.2   ALBUMIN 2.2* 2.2*   BILITOT 0.5 0.5   ALKPHOS 184* 205*   AST 18 21   ALT 10 11   ANIONGAP 10 7*   EGFRNONAA 36.7* 46.3*       Significant Imaging: I have reviewed all pertinent imaging results/findings within the past 24 hours.      Assessment/Plan:      * Enteritis of infectious origin  Patient presented with abdominal pain suspicious  for CMV colitis. MRI of the abdomen w/o contrast at OSH shows mild small bowel mucosa thickening  consistent with enteritis. Suspicious for CMV in this immunosuppressed patient   Bx taken during EGD of  normal duodenum - on prior imaging, the 2nd and 3rd portion of duodenum and jejunum were affected, and EGD did not go this distal - so if Bx are negative, this does not rule out CMV colitis  ID consulted- Continue  on ganciclovir. Started on fluids for renal protection. Patient reported an irritable mood since GCV was started. Discussed with ID- this is not a well known adverse effect and is the only anti-CMV option in house. Likely related to anesthesia from EGD? Will continue to monitor.   Patient with worsening abdominal pain and 2 bouts of diarrhea(which he states is typical for him due to antibiotics), also noted to have spiked a fever overnight s/p blood transfusion. Concern for worsening symptoms since zosyn was d/c. Discussed with ID who stated will like to have patient continue with CMV treatment for 4 days.  Pain could possibly be due to adhesions from prior surgeries. However that is more so a diagnosis of exlusion    Plan  F/u Blood cultures  Restarted IV zosyn  CT abdomen w/o contrast  US Mesenteric Ischemia   Lactic acid levels  Stool ova/ parasites and cultures  Abdominal binder for comfort  Will f/u pending cultures in house  Will f/u CMV quant and endoscopy biopsies  If continues to worsen, will touch base with GI to hopefully consider advanced endoscopy to obtain tissues int he area of inflammation seen on imaging            Anemia of chronic disease  Per chart review, Hb had been stable in the 10.5-12 range for the past months. H/H dropped at OSH with no obvious source of bleeding and needed 2 tranfusions.  - Although reliability will no be optimal since he was already transfused.   Claudia, LDH, T bili wnl. Haptoglobin elevated likely due to inflammatory process  Iron studies consistent with anemia  of chronic disease and bone marrow suppression from active inflammatory process  Transfuse if Hgb <7.    H/H 7.2 in light of symptoms (lightheadness, fatigue). S/p 1 unit of pRBCs, repeat H/H 8.1/25.5.      Right upper quadrant abdominal pain  30 yo male with Liver transplant in 2017 secondary to alcoholic hepatitis and multiple post-op complications including biliary duct stricture s/p multiple ERCPs w/stent placement, infections and prolonged LAURA requiring dialysis, coming for severe right sided abdominal pain with fever. Per notes, imaging from OSH CT non-contrast and MRI abdomen demonstrate signs of enteritis(mucosal thickening) small right pleural effusion and splenomegaly.          Weakness        Enteritis        Umbilical hernia  Reducible. 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 Needed multiple sessions of dialysis and finally slowly recovered. Last sCr 1.4 in 07/2019. sCr at 2.3 on admission, LAURA from current abdominal pathology.  Improving.  sCr currently at 1.9      Thrombocytopenia  Pt does have a history of thrombocytopenia and underwent bone marrow biopsy in 2018 thought to be from meds vs peripheral destruction.   -stable    Alcohol use disorder, severe, in early remission  Patient states he has not taken alcohol recently.    Long-term use of immunosuppressant medication  Per records patient was on cyclosporine and due to concern for TMA he was switched to rapamune in 05/2018    -Hepatology following  -Sirolimus dosages per hepatology recs   -daily CBC, BMP, LFT, INR, immunosuppressant trough level        S/P liver transplant  Performed in 10/19/19 secondary to alcoholic hepatitis. LFTs not concerning for rejection.       VTE Risk Mitigation (From admission, onward)         Ordered     enoxaparin injection 40 mg  Daily      11/23/19 0300                       Bucky Jose MD  Department of [facility] Medicine   [facility]

## 2019-11-27 NOTE — NURSING
Spoke with MD, reported current vital signs, vitals prior to blood administration and during, as well as current symptoms (nausea).  Per MD, he will enter new orders into Epic.

## 2019-11-27 NOTE — ASSESSMENT & PLAN NOTE
30yo man w/a history of asthma and alcoholic cirrhosis (c/b HE, EV, portal HTN, and HRS; s/p DDLT 10/19/2017, CMV D+/R+, steroid induction, on maintenance sirolimus; c/b seizures, LAURA, superificial wound infection s/p wound vac closure, culture negative peritonitis due to suspected indolent biliary leakage s/p repeated sphincterotomy/biliary stent placement, subsequent loculated RLQ collection s/p perc-drainage and prolonged empiric antibiotics course, gastritis/duodenal ulcer 3/6/2018, and CMV infection 3/2018) who was admitted on 11/22/2019 as a transfer from an OSH in [facility] (close to 2wks into admission) with low grade temps (peak 100F), N/V, and RUQ abdominal pain due to enteritis of unknown etiology (seen on MRI abdomen 11/21 in distal duodenum at OSH; notable no significant ascites, fluid collection, or pancreatitis on that study; patient denies associated diarrhea or bloody stools). He is stable on empiric GCV with differential most notable for CMV (moreso than community acquired viral infection that would have run its course likely by now) vs ischemic insult vs another OI (PTLD among others).    - will observe off of antibiotics given no response to prolonged course or evidence in imaging/cx of bacterial infection  - will continue induction GCV (2.5mg/kg IV q24h for current CrCl) and observe response over next few days while awaiting suboptimal biopsy results  - will f/u CMV quant and endoscopy biopsies -- of note, biopsies were unable to be taking of enhancing distal duodenum so may not be representative if unremarkable  - agree with repeat CT A/P given acute change and mesenteric doppler  - await pending EBV quant and if has diarrhea would send GI stool pathogens panel

## 2019-11-27 NOTE — ASSESSMENT & PLAN NOTE
Pt does have a history of thrombocytopenia and underwent bone marrow biopsy in 2018 thought to be from meds vs peripheral destruction.   -stable

## 2019-11-27 NOTE — ASSESSMENT & PLAN NOTE
Patient presented with abdominal pain suspicious for CMV colitis. MRI of the abdomen w/o contrast at OSH shows mild small bowel mucosa thickening  consistent with enteritis. Suspicious for CMV in this immunosuppressed patient   Bx taken during EGD of  normal duodenum - on prior imaging, the 2nd and 3rd portion of duodenum and jejunum were affected, and EGD did not go this distal - so if Bx are negative, this does not rule out CMV colitis  ID consulted- Continue  on ganciclovir. Started on fluids for renal protection. Patient reported an irritable mood since GCV was started. Discussed with ID- this is not a well known adverse effect and is the only anti-CMV option in house. Likely related to anesthesia from EGD? Will continue to monitor.   Patient with worsening abdominal pain and 2 bouts of diarrhea(which he states is typical for him due to antibiotics), also noted to have spiked a fever overnight s/p blood transfusion. Concern for worsening symptoms since zosyn was d/c. Discussed with ID who stated will like to have patient continue with CMV treatment for 4 days.  Pain could possibly be due to adhesions from prior surgeries. However that is more so a diagnosis of exlusion    Plan  F/u Blood cultures  Restarted IV zosyn  CT abdomen w/o contrast  US Mesenteric Ischemia   Lactic acid levels  Stool ova/ parasites and cultures  Abdominal binder for comfort  Will f/u pending cultures in house  Will f/u CMV quant and endoscopy biopsies  If continues to worsen, will touch base with GI to hopefully consider advanced endoscopy to obtain tissues int he area of inflammation seen on imaging

## 2019-11-28 LAB
ALBUMIN SERPL BCP-MCNC: 2.2 G/DL (ref 3.5–5.2)
ALP SERPL-CCNC: 220 U/L (ref 55–135)
ALT SERPL W/O P-5'-P-CCNC: 13 U/L (ref 10–44)
ANION GAP SERPL CALC-SCNC: 10 MMOL/L (ref 8–16)
AST SERPL-CCNC: 25 U/L (ref 10–40)
BACTERIA BLD CULT: NORMAL
BACTERIA BLD CULT: NORMAL
BASOPHILS # BLD AUTO: 0.02 K/UL (ref 0–0.2)
BASOPHILS NFR BLD: 0.4 % (ref 0–1.9)
BILIRUB SERPL-MCNC: 0.7 MG/DL (ref 0.1–1)
BUN SERPL-MCNC: 12 MG/DL (ref 6–20)
CALCIUM SERPL-MCNC: 8.3 MG/DL (ref 8.7–10.5)
CHLORIDE SERPL-SCNC: 107 MMOL/L (ref 95–110)
CO2 SERPL-SCNC: 22 MMOL/L (ref 23–29)
CREAT SERPL-MCNC: 1.7 MG/DL (ref 0.5–1.4)
DIFFERENTIAL METHOD: ABNORMAL
EOSINOPHIL # BLD AUTO: 0.2 K/UL (ref 0–0.5)
EOSINOPHIL NFR BLD: 4.4 % (ref 0–8)
ERYTHROCYTE [DISTWIDTH] IN BLOOD BY AUTOMATED COUNT: 14.9 % (ref 11.5–14.5)
EST. GFR  (AFRICAN AMERICAN): >60 ML/MIN/1.73 M^2
EST. GFR  (NON AFRICAN AMERICAN): 52.9 ML/MIN/1.73 M^2
GLUCOSE SERPL-MCNC: 72 MG/DL (ref 70–110)
HCT VFR BLD AUTO: 26.6 % (ref 40–54)
HGB BLD-MCNC: 8.1 G/DL (ref 14–18)
IMM GRANULOCYTES # BLD AUTO: 0.02 K/UL (ref 0–0.04)
IMM GRANULOCYTES NFR BLD AUTO: 0.4 % (ref 0–0.5)
INR PPP: 1 (ref 0.8–1.2)
LYMPHOCYTES # BLD AUTO: 1.3 K/UL (ref 1–4.8)
LYMPHOCYTES NFR BLD: 26.6 % (ref 18–48)
MCH RBC QN AUTO: 26.6 PG (ref 27–31)
MCHC RBC AUTO-ENTMCNC: 30.5 G/DL (ref 32–36)
MCV RBC AUTO: 88 FL (ref 82–98)
MONOCYTES # BLD AUTO: 0.4 K/UL (ref 0.3–1)
MONOCYTES NFR BLD: 8.6 % (ref 4–15)
NEUTROPHILS # BLD AUTO: 2.8 K/UL (ref 1.8–7.7)
NEUTROPHILS NFR BLD: 59.6 % (ref 38–73)
NRBC BLD-RTO: 0 /100 WBC
PLATELET # BLD AUTO: 130 K/UL (ref 150–350)
PMV BLD AUTO: 9.4 FL (ref 9.2–12.9)
POTASSIUM SERPL-SCNC: 3.9 MMOL/L (ref 3.5–5.1)
PROT SERPL-MCNC: 7.3 G/DL (ref 6–8.4)
PROTHROMBIN TIME: 10.1 SEC (ref 9–12.5)
RBC # BLD AUTO: 3.04 M/UL (ref 4.6–6.2)
SIROLIMUS BLD-MCNC: 5.1 NG/ML (ref 4–20)
SODIUM SERPL-SCNC: 139 MMOL/L (ref 136–145)
WBC # BLD AUTO: 4.77 K/UL (ref 3.9–12.7)

## 2019-11-28 PROCEDURE — 63600175 PHARM REV CODE 636 W HCPCS: Performed by: INTERNAL MEDICINE

## 2019-11-28 PROCEDURE — 87427 SHIGA-LIKE TOXIN AG IA: CPT | Mod: 59

## 2019-11-28 PROCEDURE — 20600001 HC STEP DOWN PRIVATE ROOM

## 2019-11-28 PROCEDURE — 87045 FECES CULTURE AEROBIC BACT: CPT

## 2019-11-28 PROCEDURE — 25000003 PHARM REV CODE 250: Performed by: STUDENT IN AN ORGANIZED HEALTH CARE EDUCATION/TRAINING PROGRAM

## 2019-11-28 PROCEDURE — 85610 PROTHROMBIN TIME: CPT

## 2019-11-28 PROCEDURE — 85025 COMPLETE CBC W/AUTO DIFF WBC: CPT

## 2019-11-28 PROCEDURE — 99233 SBSQ HOSP IP/OBS HIGH 50: CPT | Mod: ,,, | Performed by: INTERNAL MEDICINE

## 2019-11-28 PROCEDURE — 87209 SMEAR COMPLEX STAIN: CPT

## 2019-11-28 PROCEDURE — 87046 STOOL CULTR AEROBIC BACT EA: CPT | Mod: 59

## 2019-11-28 PROCEDURE — 25000003 PHARM REV CODE 250: Performed by: INTERNAL MEDICINE

## 2019-11-28 PROCEDURE — 80053 COMPREHEN METABOLIC PANEL: CPT

## 2019-11-28 PROCEDURE — 63600175 PHARM REV CODE 636 W HCPCS: Performed by: STUDENT IN AN ORGANIZED HEALTH CARE EDUCATION/TRAINING PROGRAM

## 2019-11-28 PROCEDURE — 36415 COLL VENOUS BLD VENIPUNCTURE: CPT

## 2019-11-28 PROCEDURE — 99233 SBSQ HOSP IP/OBS HIGH 50: CPT | Mod: ,,, | Performed by: HOSPITALIST

## 2019-11-28 PROCEDURE — 99233 PR SUBSEQUENT HOSPITAL CARE,LEVL III: ICD-10-PCS | Mod: ,,, | Performed by: HOSPITALIST

## 2019-11-28 PROCEDURE — 80195 ASSAY OF SIROLIMUS: CPT

## 2019-11-28 PROCEDURE — 99233 PR SUBSEQUENT HOSPITAL CARE,LEVL III: ICD-10-PCS | Mod: ,,, | Performed by: INTERNAL MEDICINE

## 2019-11-28 PROCEDURE — 87209 SMEAR COMPLEX STAIN: CPT | Mod: 91

## 2019-11-28 PROCEDURE — 94761 N-INVAS EAR/PLS OXIMETRY MLT: CPT

## 2019-11-28 RX ORDER — BISACODYL 10 MG
10 SUPPOSITORY, RECTAL RECTAL ONCE
Status: COMPLETED | OUTPATIENT
Start: 2019-11-28 | End: 2019-11-28

## 2019-11-28 RX ORDER — SYRING-NEEDL,DISP,INSUL,0.3 ML 29 G X1/2"
296 SYRINGE, EMPTY DISPOSABLE MISCELLANEOUS ONCE
Status: COMPLETED | OUTPATIENT
Start: 2019-11-28 | End: 2019-11-28

## 2019-11-28 RX ADMIN — HYDROCODONE BITARTRATE AND ACETAMINOPHEN 1 TABLET: 10; 325 TABLET ORAL at 02:11

## 2019-11-28 RX ADMIN — PANTOPRAZOLE SODIUM 40 MG: 40 TABLET, DELAYED RELEASE ORAL at 09:11

## 2019-11-28 RX ADMIN — HYDROCODONE BITARTRATE AND ACETAMINOPHEN 1 TABLET: 10; 325 TABLET ORAL at 07:11

## 2019-11-28 RX ADMIN — POLYETHYLENE GLYCOL 3350 17 G: 17 POWDER, FOR SOLUTION ORAL at 02:11

## 2019-11-28 RX ADMIN — HYDROMORPHONE HYDROCHLORIDE 0.5 MG: 1 INJECTION, SOLUTION INTRAMUSCULAR; INTRAVENOUS; SUBCUTANEOUS at 05:11

## 2019-11-28 RX ADMIN — SODIUM CHLORIDE, SODIUM LACTATE, POTASSIUM CHLORIDE, AND CALCIUM CHLORIDE: .6; .31; .03; .02 INJECTION, SOLUTION INTRAVENOUS at 05:11

## 2019-11-28 RX ADMIN — HYDROMORPHONE HYDROCHLORIDE 0.5 MG: 1 INJECTION, SOLUTION INTRAMUSCULAR; INTRAVENOUS; SUBCUTANEOUS at 08:11

## 2019-11-28 RX ADMIN — PROMETHAZINE HYDROCHLORIDE 12.5 MG: 25 INJECTION INTRAMUSCULAR; INTRAVENOUS at 01:11

## 2019-11-28 RX ADMIN — HYDROCODONE BITARTRATE AND ACETAMINOPHEN 1 TABLET: 10; 325 TABLET ORAL at 11:11

## 2019-11-28 RX ADMIN — HYDROCODONE BITARTRATE AND ACETAMINOPHEN 1 TABLET: 10; 325 TABLET ORAL at 01:11

## 2019-11-28 RX ADMIN — HYDROMORPHONE HYDROCHLORIDE 0.5 MG: 1 INJECTION, SOLUTION INTRAMUSCULAR; INTRAVENOUS; SUBCUTANEOUS at 01:11

## 2019-11-28 RX ADMIN — POLYETHYLENE GLYCOL 3350 17 G: 17 POWDER, FOR SOLUTION ORAL at 07:11

## 2019-11-28 RX ADMIN — MAGNESIUM CITRATE 296 ML: 1.75 LIQUID ORAL at 05:11

## 2019-11-28 RX ADMIN — HYDROCODONE BITARTRATE AND ACETAMINOPHEN 1 TABLET: 10; 325 TABLET ORAL at 05:11

## 2019-11-28 RX ADMIN — PIPERACILLIN AND TAZOBACTAM 4.5 G: 4; .5 INJECTION, POWDER, FOR SOLUTION INTRAVENOUS at 05:11

## 2019-11-28 RX ADMIN — POLYETHYLENE GLYCOL 3350 17 G: 17 POWDER, FOR SOLUTION ORAL at 09:11

## 2019-11-28 RX ADMIN — GANCICLOVIR SODIUM 155 MG: 500 INJECTION, POWDER, LYOPHILIZED, FOR SOLUTION INTRAVENOUS at 02:11

## 2019-11-28 RX ADMIN — SIROLIMUS 0.5 MG: 0.5 TABLET, SUGAR COATED ORAL at 09:11

## 2019-11-28 RX ADMIN — BISACODYL 10 MG: 10 SUPPOSITORY RECTAL at 05:11

## 2019-11-28 RX ADMIN — HYDROCODONE BITARTRATE AND ACETAMINOPHEN 1 TABLET: 10; 325 TABLET ORAL at 10:11

## 2019-11-28 RX ADMIN — Medication 1 CAPSULE: at 09:11

## 2019-11-28 RX ADMIN — FOLIC ACID 1 MG: 1 TABLET ORAL at 09:11

## 2019-11-28 NOTE — ASSESSMENT & PLAN NOTE
Patient presented with abdominal pain suspicious for CMV colitis. MRI of the abdomen w/o contrast at OSH shows mild small bowel mucosa thickening  consistent with enteritis. Suspicious for CMV in this immunosuppressed patient   Bx taken during EGD of  normal duodenum - on prior imaging, the 2nd and 3rd portion of duodenum and jejunum were affected, and EGD did not go this distal - so if Bx are negative, this does not rule out CMV colitis  ID consulted- Continue  on ganciclovir. Started on fluids for renal protection. Patient reported an irritable mood since GCV was started. Discussed with ID- this is not a well known adverse effect and is the only anti-CMV option in house. Likely related to anesthesia from EGD? Will continue to monitor.   Patient with worsening abdominal pain and 2 bouts of diarrhea(which he states is typical for him due to antibiotics), also noted to have spiked a fever overnight s/p blood transfusion. Concern for worsening symptoms since zosyn was d/c. Discussed with ID who stated will like to have patient continue with CMV treatment for 4 days.  CT abd shows significant amount of stool in large colon  Pain could possibly be due to adhesions from prior surgeries. However that is more so a diagnosis of exclusion. Discussed Gen surgery who recommend continued laxatives as patient doesn't appear obstructed per their review.       Plan  Continue with ganciclovir per ID recs.   Discontinued zosyn  Continue with laxatives to encourage adequate bowel movements  US Mesenteric ischemia limited due to abdominal gas. Will repeat tomorrow AM. NPO at midnight   F/u stool cultures and GI pathogens stool studies   Per ID: 'f/u endoscopy biopsies (CMV quant notably negative but ~20% of cases of GI tissue invasive disease are not accompanied by viremia) -- of note, biopsies were unable to be taken of enhancing distal duodenum so may not be representative if unremarkable'

## 2019-11-28 NOTE — TREATMENT PLAN
Hepatology Treatment Plan    Jhonny Diana is a 29 y.o. male admitted to hospital 11/22/2019 (Hospital Day: 7) due to Enteritis of infectious origin. Hepatology following for immunosuppression management.    Lab Results   Component Value Date    TACROLIMUS <1.5 (L) 03/04/2018    TACROLIMUS 1.6 (L) 03/03/2018    TACROLIMUS 3.2 (L) 03/02/2018    TACROLIMUS 3.2 (L) 03/02/2018    TACROLIMUS 3.2 (L) 03/02/2018       Lab Results   Component Value Date    CREATININE 1.7 (H) 11/28/2019    CREATININE 1.9 (H) 11/27/2019    CREATININE 2.3 (H) 11/26/2019       Lab Results   Component Value Date    ALT 13 11/28/2019    AST 25 11/28/2019     (H) 10/26/2017    ALKPHOS 220 (H) 11/28/2019    BILITOT 0.7 11/28/2019    WBC 4.77 11/28/2019    HGB 8.1 (L) 11/28/2019     (L) 11/28/2019       Kidney function is improving  Liver enzymes are stable    Plan  - Continue immunosuppression regimen without changes    Immunosuppression Regimen:  Sirolimus: 0.5mg daily    Please notify hepatology on day of discharge to discuss discharge medications and follow up.     Please obtain daily CBC, BMP, LFT, INR, immunosuppressant trough level    Thank you for involving us in the care of Jhonny Diana. Please call with any additional concerns or questions.    Aneudy Barrios MD  Gastroenterology Fellow

## 2019-11-28 NOTE — PROGRESS NOTES
Patient returned to the unit via stretcher. Minimal c/o pain, VSS, LR @ 100. Will continue to monitor the patient

## 2019-11-28 NOTE — PROGRESS NOTES
"[facility] Hospital Medicine  Progress Note    Patient Name: Jhonny Diana  MRN: [id number]  Patient Class: IP- Inpatient   Admission Date: 11/22/2019  Length of Stay: 5 days  Attending Physician: Michelle Rowe MD  Primary Care Provider: Primary Doctor [facility] Medicine Team: [facility] [place], [place] HOSP MED 1 Bucky Jose MD    Subjective:     Principal Problem:Enteritis of infectious origin        HPI:  28 yo male patient with a history of HTN and Liver transplant performed 10/19/17 secondary to acute alcoholic hepatitis with multiple post-op complications including biliary stenosis s/p multiple ERCPs, adhesions, abdominal infections and prolonged LAURA requiring dialysis, who presents today as a transfer from [place], [place] due to R sided abdominal pain and fever.  His symptoms began around 11/11 with abdominal pain located on the right side, described as a colicky-type of pain of variable intensity, ranging from 3/10 to 9/10 accompanied by fever, poor appetite, nausea and 1 episode of vomiting. For this reason he presented to [facility] on 11/18/19 where per transfer note  he was hypertensive and tachycardic. He was started on empiric zosyn.  Imaging CT non-contrast and MRI abdomen demonstrated signs of enteritis(mucosal thickening) small right pleural effusion, splenomegaly.  An U/S abdomen with comment of "possible cirrhosis". He also developed a drop in h/h needing 2 PRBC transfusions along with mild stable Cr elevation (1.8-2.1). The patient was supposed to be transferred but due to insurance problems he left [place] and was driven here by his mother.  In the ER his Tmax was 99.4 HR 88 /83 Sats 98% RA. Currently he complains of pain 6/10 located to the right side that travels to his right flank. Denies any changes in his bowel habits, no constipation or diarrhea. Denies having a paracentesis at OSH.     Overview/Hospital Course:  Patient was admitted for abdominal pain. MRI Abd w/o contrast at " OSH showed changes consistent with enteritis. ID and hepatology consulted. Patient was started on zosyn on admission. CMV pending. Started on ganciclovir with plan to undergo EGD performed and bcx was done.     Interval History: Patient reports some improvement in abdominal pain after bowel movements. D/c zosyn.     Review of Systems   Constitutional: Positive for activity change. Negative for appetite change, chills and fever.   Respiratory: Negative for apnea, cough, chest tightness, shortness of breath and wheezing.    Cardiovascular: Negative for chest pain, palpitations and leg swelling.   Gastrointestinal: Positive for abdominal pain and diarrhea. Negative for abdominal distention.   Genitourinary: Negative for difficulty urinating and dysuria.   Musculoskeletal: Negative for arthralgias, back pain and myalgias.   Skin: Negative for color change, pallor and rash.   Neurological: Negative for dizziness, tremors, weakness, light-headedness and headaches.   Psychiatric/Behavioral: Negative for agitation, behavioral problems and confusion.     Objective:     Vital Signs (Most Recent):  Temp: 98.4 °F (36.9 °C) (11/28/19 1125)  Pulse: 85 (11/28/19 1125)  Resp: 18 (11/28/19 1125)  BP: 137/76 (11/28/19 1125)  SpO2: 99 % (11/28/19 1140) Vital Signs (24h Range):  Temp:  [98.2 °F (36.8 °C)-99.1 °F (37.3 °C)] 98.4 °F (36.9 °C)  Pulse:  [81-86] 85  Resp:  [16-20] 18  SpO2:  [98 %-100 %] 99 %  BP: (133-155)/(76-98) 137/76     Weight: 61.7 kg (136 lb 2.1 oz)  Body mass index is 21.97 kg/m².    Intake/Output Summary (Last 24 hours) at 11/28/2019 1653  Last data filed at 11/28/2019 1400  Gross per 24 hour   Intake 5981.67 ml   Output 1300 ml   Net 4681.67 ml      Physical Exam   Constitutional: He is oriented to person, place, and time. He appears well-developed and well-nourished.   Appears uncomfortable    HENT:   Head: Normocephalic and atraumatic.   Mouth/Throat: Oropharynx is clear and moist.   Eyes: Conjunctivae and EOM  are normal.   Neck: Normal range of motion.   Cardiovascular: Normal rate, regular rhythm, normal heart sounds and intact distal pulses.   No murmur heard.  Pulmonary/Chest: Effort normal and breath sounds normal. He has no wheezes. He has no rales.   Abdominal: Soft. Bowel sounds are normal. He exhibits no distension and no mass. There is tenderness (Most notably in RUQ ). A hernia (umbilical reducible hernia noted) is present.   Musculoskeletal: Normal range of motion. He exhibits no edema or tenderness.   Neurological: He is alert and oriented to person, place, and time.   Skin: Skin is warm and dry. No rash noted. No erythema.   Psychiatric: He has a normal mood and affect. His behavior is normal. Thought content normal.       Significant Labs:   BMP:   Recent Labs   Lab 11/28/19  0727   GLU 72      K 3.9      CO2 22*   BUN 12   CREATININE 1.7*   CALCIUM 8.3*     CBC:   Recent Labs   Lab 11/27/19  0505 11/28/19  0727   WBC 4.41 4.77   HGB 8.1* 8.1*   HCT 25.5* 26.6*   * 130*     CMP:   Recent Labs   Lab 11/27/19  0505 11/28/19  0727    139   K 3.8 3.9    107   CO2 22* 22*   GLU 87 72   BUN 12 12   CREATININE 1.9* 1.7*   CALCIUM 8.3* 8.3*   PROT 7.2 7.3   ALBUMIN 2.2* 2.2*   BILITOT 0.5 0.7   ALKPHOS 205* 220*   AST 21 25   ALT 11 13   ANIONGAP 7* 10   EGFRNONAA 46.3* 52.9*       Significant Imaging: I have reviewed all pertinent imaging results/findings within the past 24 hours.      Assessment/Plan:      * Enteritis of infectious origin  Patient presented with abdominal pain suspicious for CMV colitis. MRI of the abdomen w/o contrast at OSH shows mild small bowel mucosa thickening  consistent with enteritis. Suspicious for CMV in this immunosuppressed patient   Bx taken during EGD of  normal duodenum - on prior imaging, the 2nd and 3rd portion of duodenum and jejunum were affected, and EGD did not go this distal - so if Bx are negative, this does not rule out CMV colitis  ID  consulted- Continue  on ganciclovir. Started on fluids for renal protection. Patient reported an irritable mood since GCV was started. Discussed with ID- this is not a well known adverse effect and is the only anti-CMV option in house. Likely related to anesthesia from EGD? Will continue to monitor.   Patient with worsening abdominal pain and 2 bouts of diarrhea(which he states is typical for him due to antibiotics), also noted to have spiked a fever overnight s/p blood transfusion. Concern for worsening symptoms since zosyn was d/c. Discussed with ID who stated will like to have patient continue with CMV treatment for 4 days.  CT abd shows significant amount of stool in large colon  Pain could possibly be due to adhesions from prior surgeries. However that is more so a diagnosis of exclusion. Discussed Gen surgery who recommend continued laxatives as patient doesn't appear obstructed per their review.       Plan  Continue with ganciclovir per ID recs.   Discontinued zosyn  Continue with laxatives to encourage adequate bowel movements  US Mesenteric ischemia limited due to abdominal gas. Will repeat tomorrow AM. NPO at midnight   F/u stool cultures and GI pathogens stool studies   Per ID: 'f/u endoscopy biopsies (CMV quant notably negative but ~20% of cases of GI tissue invasive disease are not accompanied by viremia) -- of note, biopsies were unable to be taken of enhancing distal duodenum so may not be representative if unremarkable'            Anemia of chronic disease  Per chart review, Hb had been stable in the 10.5-12 range for the past months. H/H dropped at OSH with no obvious source of bleeding and needed 2 tranfusions.  - Although reliability will no be optimal since he was already transfused.   Claudia, LDH, T bili wnl. Haptoglobin elevated likely due to inflammatory process  Iron studies consistent with anemia of chronic disease and bone marrow suppression from active inflammatory process  Transfuse if  Hgb <7.    H/H 7.2 in light of symptoms (lightheadness, fatigue). S/p 1 unit of pRBCs, repeat H/H 8.1/25.5.      Right upper quadrant abdominal pain  30 yo male with Liver transplant in 2017 secondary to alcoholic hepatitis and multiple post-op complications including biliary duct stricture s/p multiple ERCPs w/stent placement, infections and prolonged LAURA requiring dialysis, coming for severe right sided abdominal pain with fever. Per notes, imaging from OSH CT non-contrast and MRI abdomen demonstrate signs of enteritis(mucosal thickening) small right pleural effusion and splenomegaly.    -Discussed with General surgery. Per their review pateint doesn't appear obstructed and no physical exam findings or imaging findings concerning for surgical intervention needs. Recommended to continue with laxatives. Will touch base if abdominal pain doesn't improve despite efforts given his prior hx of adhesions.         Weakness        Enteritis        Umbilical hernia  Reducible. 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 Needed multiple sessions of dialysis and finally slowly recovered. Last sCr 1.4 in 07/2019. sCr at 2.3 on admission, LAURA from current abdominal pathology.  Improving.  sCr currently at 1.7  -improving      Thrombocytopenia  Pt does have a history of thrombocytopenia and underwent bone marrow biopsy in 2018 thought to be from meds vs peripheral destruction.   -stable    Alcohol use disorder, severe, in early remission  Patient states he has not taken alcohol recently.    Long-term use of immunosuppressant medication  Per records patient was on cyclosporine and due to concern for TMA he was switched to rapamune in 05/2018    -Hepatology following  -Sirolimus dosages per hepatology recs   -daily CBC, BMP, LFT, INR, immunosuppressant trough level        S/P  liver transplant  Performed in 10/19/19 secondary to alcoholic hepatitis. LFTs not concerning for rejection.       VTE Risk Mitigation (From admission, onward)         Ordered     enoxaparin injection 40 mg  Daily      11/23/19 0300                      Bucky Jose MD  Department of Hospital Medicine   [facility]

## 2019-11-28 NOTE — PROGRESS NOTES
Patient transported via stretcher to ultrasound by transporter. VSS. LR @ 100 mL/hr. Infusing. Minimal c/o abdominal pain. Pain medication given this morning and not available for administration.

## 2019-11-28 NOTE — PROGRESS NOTES
[facility]  Infectious Disease  Progress Note    Patient Name: Jhonny Diana  MRN: [id number]  Admission Date: 11/22/2019  Length of Stay: 5 days  Attending Physician: Michelle Rowe MD  Primary Care Provider: Primary Doctor No    Isolation Status: No active isolations  Assessment/Plan:      Enteritis  30yo man w/a history of asthma and alcoholic cirrhosis (c/b HE, EV, portal HTN, and HRS; s/p DDLT 10/19/2017, CMV D+/R+, steroid induction, on maintenance sirolimus; c/b seizures, LAURA, superificial wound infection s/p wound vac closure, culture negative peritonitis due to suspected indolent biliary leakage s/p repeated sphincterotomy/biliary stent placement, subsequent loculated RLQ collection s/p perc-drainage and prolonged empiric antibiotics course, gastritis/duodenal ulcer 3/6/2018, and CMV infection 3/2018) who was admitted on 11/22/2019 as a transfer from an OSH in [facility] (close to 2wks into admission) with low grade temps (peak 100F), N/V, and RUQ abdominal pain due to enteritis of unknown etiology (seen on MRI abdomen 11/21 in distal duodenum at OSH; notable no significant ascites, fluid collection, or pancreatitis on that study; patient denies associated diarrhea or bloody stools). He is stable on empiric GCV with differential most notable for CMV (moreso than community acquired viral infection that would have run its course likely by now) vs ischemic insult vs another OI (PTLD among others). He is stable while awaiting additional testing.    - will observe off of antibiotics given no response to prolonged course or evidence in imaging/cx of bacterial infection  - will continue induction GCV (2.5mg/kg IV q24h for current CrCl) and observe response over next few days while awaiting suboptimal biopsy results  - will f/u endoscopy biopsies (CMV quant notably negative but ~20% of cases of GI tissue invasive disease are not accompanied by viremia) -- of note, biopsies were unable to be  taking of enhancing distal duodenum so may not be representative if unremarkable  - repeat CT A/P shows significant constipation -- continued bowel regimen  - will await pending stool studies and EBV quant  - mesenteric doppler negative but did not capture all vessels of interest -- if symptoms/fevers do not resolve, can consider repeating after constipation is adequately treated        Anticipated Disposition: pending improvement    Thank you for your consult. I will follow-up with patient. Please contact us if you have any additional questions.     Melanie Bergman MD  Transplant ID Attending  [id number]    Melanie Bergman MD  Infectious Disease  [facility]    Subjective:     Principal Problem:Enteritis of infectious origin    HPI: No notes on file  Interval History: Afebrile with slightly improved abdominal pain. Notes some loose stools x2 and stool studies sent. CT A/P shows constipation and no new findings compared to prior imaging. Mesenteric doppler unremarkable but 2 vessels not seen.    Review of Systems   Constitutional: Negative for activity change, appetite change, chills, diaphoresis, fatigue, fever and unexpected weight change.   HENT: Negative for sore throat and trouble swallowing.    Eyes: Negative for visual disturbance.   Respiratory: Negative for chest tightness and shortness of breath.    Cardiovascular: Negative for chest pain and leg swelling.   Gastrointestinal: Positive for abdominal pain and nausea. Negative for abdominal distention, anal bleeding, blood in stool, constipation, diarrhea and vomiting.   Genitourinary: Negative for dysuria and hematuria.   Musculoskeletal: Negative for arthralgias and myalgias.   Skin: Negative for rash.   Neurological: Negative for dizziness, weakness, light-headedness and headaches.   Psychiatric/Behavioral: Negative for agitation and confusion.     Objective:     Vital Signs (Most Recent):  Temp: 98.4 °F (36.9 °C) (11/28/19 1125)  Pulse:  85 (11/28/19 1125)  Resp: 18 (11/28/19 1125)  BP: 137/76 (11/28/19 1125)  SpO2: 99 % (11/28/19 1140) Vital Signs (24h Range):  Temp:  [98.2 °F (36.8 °C)-99.1 °F (37.3 °C)] 98.4 °F (36.9 °C)  Pulse:  [81-86] 85  Resp:  [16-20] 18  SpO2:  [98 %-100 %] 99 %  BP: (133-155)/(76-98) 137/76     Weight: 61.7 kg (136 lb 2.1 oz)  Body mass index is 21.97 kg/m².    Estimated Creatinine Clearance: 55.5 mL/min (A) (based on SCr of 1.7 mg/dL (H)).    Physical Exam   Constitutional: He is oriented to person, place, and time. No distress.   Appears comfortable, sitting up against the wall in hallway. Accompanied by mother.   HENT:   Head: Normocephalic and atraumatic.   Mouth/Throat: Oropharynx is clear and moist. No oropharyngeal exudate.   Eyes: Conjunctivae are normal. No scleral icterus.   Neck: No JVD present.   Cardiovascular: Normal rate, regular rhythm, normal heart sounds and intact distal pulses.   Pulmonary/Chest: Effort normal and breath sounds normal. No respiratory distress.   Abdominal: Soft. Bowel sounds are normal. He exhibits no distension and no mass. There is tenderness. There is rebound. There is no guarding.   Soft abdomen, non - distended. Tenderness diffuse, but predominantly RUQ. No guarding. Some rebound tenderness.   Musculoskeletal: He exhibits no edema or tenderness.   Lymphadenopathy:     He has no cervical adenopathy.   Neurological: He is alert and oriented to person, place, and time.   Skin: Skin is warm. Capillary refill takes less than 2 seconds. He is not diaphoretic.   Psychiatric: He has a normal mood and affect. His behavior is normal. Judgment and thought content normal.   Nursing note and vitals reviewed.      Significant Labs:   CBC:   Recent Labs   Lab 11/27/19  0505 11/28/19  0727   WBC 4.41 4.77   HGB 8.1* 8.1*   HCT 25.5* 26.6*   * 130*     CMP:   Recent Labs   Lab 11/27/19  0505 11/28/19  0727    139   K 3.8 3.9    107   CO2 22* 22*   GLU 87 72   BUN 12 12    CREATININE 1.9* 1.7*   CALCIUM 8.3* 8.3*   PROT 7.2 7.3   ALBUMIN 2.2* 2.2*   BILITOT 0.5 0.7   ALKPHOS 205* 220*   AST 21 25   ALT 11 13   ANIONGAP 7* 10   EGFRNONAA 46.3* 52.9*       Significant Imaging: I have reviewed all pertinent imaging results/findings within the past 24 hours.

## 2019-11-28 NOTE — ASSESSMENT & PLAN NOTE
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, CMV D+/R+, steroid induction, on maintenance sirolimus; c/b seizures, LAURA, superificial wound infection s/p wound vac closure, culture negative peritonitis due to suspected indolent biliary leakage s/p repeated sphincterotomy/biliary stent placement, subsequent loculated RLQ collection s/p perc-drainage and prolonged empiric antibiotics course, gastritis/duodenal ulcer 3/6/2018, and CMV infection 3/2018) who was admitted on 11/22/2019 as a transfer from an OSH in [facility] (close to 2wks into admission) with low grade temps (peak 100F), N/V, and RUQ abdominal pain due to enteritis of unknown etiology (seen on MRI abdomen 11/21 in distal duodenum at OSH; notable no significant ascites, fluid collection, or pancreatitis on that study; patient denies associated diarrhea or bloody stools). He is stable on empiric GCV with differential most notable for CMV (moreso than community acquired viral infection that would have run its course likely by now) vs ischemic insult vs another OI (PTLD among others). He is stable while awaiting additional testing.    - will observe off of antibiotics given no response to prolonged course or evidence in imaging/cx of bacterial infection  - will continue induction GCV (2.5mg/kg IV q24h for current CrCl) and observe response over next few days while awaiting suboptimal biopsy results  - will f/u endoscopy biopsies (CMV quant notably negative but ~20% of cases of GI tissue invasive disease are not accompanied by viremia) -- of note, biopsies were unable to be taking of enhancing distal duodenum so may not be representative if unremarkable  - repeat CT A/P shows significant constipation -- continued bowel regimen  - will await pending stool studies and EBV quant  - mesenteric doppler negative but did not capture all vessels of interest -- if symptoms/fevers do not resolve, can  consider repeating after constipation is adequately treated

## 2019-11-28 NOTE — ASSESSMENT & PLAN NOTE
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 Needed multiple sessions of dialysis and finally slowly recovered. Last sCr 1.4 in 07/2019. sCr at 2.3 on admission, LAURA from current abdominal pathology.  Improving.  sCr currently at 1.7  -improving

## 2019-11-28 NOTE — PROGRESS NOTES
Pt transferred to TSU via wheelchair, mother at the bedside. LR @ 100 mL/hr. VSS, pt c/o ABD pain. No skin breakdown noted upon assessment. Will continue to monitor.

## 2019-11-28 NOTE — SUBJECTIVE & OBJECTIVE
Interval History: Afebrile overnight. Tmax 99.1. CT abd/pelvis.    Review of Systems  Objective:     Vital Signs (Most Recent):  Temp: 98.4 °F (36.9 °C) (11/28/19 1125)  Pulse: 85 (11/28/19 1125)  Resp: 18 (11/28/19 1125)  BP: 137/76 (11/28/19 1125)  SpO2: 99 % (11/28/19 1125) Vital Signs (24h Range):  Temp:  [98.2 °F (36.8 °C)-99.1 °F (37.3 °C)] 98.4 °F (36.9 °C)  Pulse:  [81-88] 85  Resp:  [16-20] 18  SpO2:  [97 %-100 %] 99 %  BP: (133-155)/(76-98) 137/76     Weight: 61.7 kg (136 lb 2.1 oz)  Body mass index is 21.97 kg/m².    Estimated Creatinine Clearance: 55.5 mL/min (A) (based on SCr of 1.7 mg/dL (H)).    Physical Exam    Significant Labs: {Results:85873}    Significant Imaging: {Imaging Review:97392}

## 2019-11-28 NOTE — SUBJECTIVE & OBJECTIVE
Interval History: Afebrile with slightly improved abdominal pain. Notes some loose stools x2 and stool studies sent. CT A/P shows constipation and no new findings compared to prior imaging. Mesenteric doppler unremarkable but 2 vessels not seen.    Review of Systems   Constitutional: Negative for activity change, appetite change, chills, diaphoresis, fatigue, fever and unexpected weight change.   HENT: Negative for sore throat and trouble swallowing.    Eyes: Negative for visual disturbance.   Respiratory: Negative for chest tightness and shortness of breath.    Cardiovascular: Negative for chest pain and leg swelling.   Gastrointestinal: Positive for abdominal pain and nausea. Negative for abdominal distention, anal bleeding, blood in stool, constipation, diarrhea and vomiting.   Genitourinary: Negative for dysuria and hematuria.   Musculoskeletal: Negative for arthralgias and myalgias.   Skin: Negative for rash.   Neurological: Negative for dizziness, weakness, light-headedness and headaches.   Psychiatric/Behavioral: Negative for agitation and confusion.     Objective:     Vital Signs (Most Recent):  Temp: 98.4 °F (36.9 °C) (11/28/19 1125)  Pulse: 85 (11/28/19 1125)  Resp: 18 (11/28/19 1125)  BP: 137/76 (11/28/19 1125)  SpO2: 99 % (11/28/19 1140) Vital Signs (24h Range):  Temp:  [98.2 °F (36.8 °C)-99.1 °F (37.3 °C)] 98.4 °F (36.9 °C)  Pulse:  [81-86] 85  Resp:  [16-20] 18  SpO2:  [98 %-100 %] 99 %  BP: (133-155)/(76-98) 137/76     Weight: 61.7 kg (136 lb 2.1 oz)  Body mass index is 21.97 kg/m².    Estimated Creatinine Clearance: 55.5 mL/min (A) (based on SCr of 1.7 mg/dL (H)).    Physical Exam   Constitutional: He is oriented to person, place, and time. No distress.   Appears comfortable, sitting up against the wall in hallway. Accompanied by mother.   HENT:   Head: Normocephalic and atraumatic.   Mouth/Throat: Oropharynx is clear and moist. No oropharyngeal exudate.   Eyes: Conjunctivae are normal. No scleral  icterus.   Neck: No JVD present.   Cardiovascular: Normal rate, regular rhythm, normal heart sounds and intact distal pulses.   Pulmonary/Chest: Effort normal and breath sounds normal. No respiratory distress.   Abdominal: Soft. Bowel sounds are normal. He exhibits no distension and no mass. There is tenderness. There is rebound. There is no guarding.   Soft abdomen, non - distended. Tenderness diffuse, but predominantly RUQ. No guarding. Some rebound tenderness.   Musculoskeletal: He exhibits no edema or tenderness.   Lymphadenopathy:     He has no cervical adenopathy.   Neurological: He is alert and oriented to person, place, and time.   Skin: Skin is warm. Capillary refill takes less than 2 seconds. He is not diaphoretic.   Psychiatric: He has a normal mood and affect. His behavior is normal. Judgment and thought content normal.   Nursing note and vitals reviewed.      Significant Labs:   CBC:   Recent Labs   Lab 11/27/19  0505 11/28/19  0727   WBC 4.41 4.77   HGB 8.1* 8.1*   HCT 25.5* 26.6*   * 130*     CMP:   Recent Labs   Lab 11/27/19  0505 11/28/19  0727    139   K 3.8 3.9    107   CO2 22* 22*   GLU 87 72   BUN 12 12   CREATININE 1.9* 1.7*   CALCIUM 8.3* 8.3*   PROT 7.2 7.3   ALBUMIN 2.2* 2.2*   BILITOT 0.5 0.7   ALKPHOS 205* 220*   AST 21 25   ALT 11 13   ANIONGAP 7* 10   EGFRNONAA 46.3* 52.9*       Significant Imaging: I have reviewed all pertinent imaging results/findings within the past 24 hours.

## 2019-11-28 NOTE — ASSESSMENT & PLAN NOTE
28 yo male with Liver transplant in 2017 secondary to alcoholic hepatitis and multiple post-op complications including biliary duct stricture s/p multiple ERCPs w/stent placement, infections and prolonged LAURA requiring dialysis, coming for severe right sided abdominal pain with fever. Per notes, imaging from OSH CT non-contrast and MRI abdomen demonstrate signs of enteritis(mucosal thickening) small right pleural effusion and splenomegaly.    -Discussed with General surgery. Per their review pateint doesn't appear obstructed and no physical exam findings or imaging findings concerning for surgical intervention needs. Recommended to continue with laxatives. Will touch base if abdominal pain doesn't improve despite efforts given his prior hx of adhesions.

## 2019-11-28 NOTE — NURSING
Transferred patient to TSU bed 17420 patient was AAOx4 with in no acute distress. Right upper arm midline intact.  Patient transported with personal belongings and mother.

## 2019-11-28 NOTE — SUBJECTIVE & OBJECTIVE
Interval History: Patient reports some improvement in abdominal pain after bowel movements. D/c zosyn.     Review of Systems   Constitutional: Positive for activity change. Negative for appetite change, chills and fever.   Respiratory: Negative for apnea, cough, chest tightness, shortness of breath and wheezing.    Cardiovascular: Negative for chest pain, palpitations and leg swelling.   Gastrointestinal: Positive for abdominal pain and diarrhea. Negative for abdominal distention.   Genitourinary: Negative for difficulty urinating and dysuria.   Musculoskeletal: Negative for arthralgias, back pain and myalgias.   Skin: Negative for color change, pallor and rash.   Neurological: Negative for dizziness, tremors, weakness, light-headedness and headaches.   Psychiatric/Behavioral: Negative for agitation, behavioral problems and confusion.     Objective:     Vital Signs (Most Recent):  Temp: 98.4 °F (36.9 °C) (11/28/19 1125)  Pulse: 85 (11/28/19 1125)  Resp: 18 (11/28/19 1125)  BP: 137/76 (11/28/19 1125)  SpO2: 99 % (11/28/19 1140) Vital Signs (24h Range):  Temp:  [98.2 °F (36.8 °C)-99.1 °F (37.3 °C)] 98.4 °F (36.9 °C)  Pulse:  [81-86] 85  Resp:  [16-20] 18  SpO2:  [98 %-100 %] 99 %  BP: (133-155)/(76-98) 137/76     Weight: 61.7 kg (136 lb 2.1 oz)  Body mass index is 21.97 kg/m².    Intake/Output Summary (Last 24 hours) at 11/28/2019 1653  Last data filed at 11/28/2019 1400  Gross per 24 hour   Intake 5981.67 ml   Output 1300 ml   Net 4681.67 ml      Physical Exam   Constitutional: He is oriented to person, place, and time. He appears well-developed and well-nourished.   Appears uncomfortable    HENT:   Head: Normocephalic and atraumatic.   Mouth/Throat: Oropharynx is clear and moist.   Eyes: Conjunctivae and EOM are normal.   Neck: Normal range of motion.   Cardiovascular: Normal rate, regular rhythm, normal heart sounds and intact distal pulses.   No murmur heard.  Pulmonary/Chest: Effort normal and breath sounds  normal. He has no wheezes. He has no rales.   Abdominal: Soft. Bowel sounds are normal. He exhibits no distension and no mass. There is tenderness (Most notably in RUQ ). A hernia (umbilical reducible hernia noted) is present.   Musculoskeletal: Normal range of motion. He exhibits no edema or tenderness.   Neurological: He is alert and oriented to person, place, and time.   Skin: Skin is warm and dry. No rash noted. No erythema.   Psychiatric: He has a normal mood and affect. His behavior is normal. Thought content normal.       Significant Labs:   BMP:   Recent Labs   Lab 11/28/19 0727   GLU 72      K 3.9      CO2 22*   BUN 12   CREATININE 1.7*   CALCIUM 8.3*     CBC:   Recent Labs   Lab 11/27/19 0505 11/28/19 0727   WBC 4.41 4.77   HGB 8.1* 8.1*   HCT 25.5* 26.6*   * 130*     CMP:   Recent Labs   Lab 11/27/19 0505 11/28/19 0727    139   K 3.8 3.9    107   CO2 22* 22*   GLU 87 72   BUN 12 12   CREATININE 1.9* 1.7*   CALCIUM 8.3* 8.3*   PROT 7.2 7.3   ALBUMIN 2.2* 2.2*   BILITOT 0.5 0.7   ALKPHOS 205* 220*   AST 21 25   ALT 11 13   ANIONGAP 7* 10   EGFRNONAA 46.3* 52.9*       Significant Imaging: I have reviewed all pertinent imaging results/findings within the past 24 hours.

## 2019-11-28 NOTE — PLAN OF CARE
Pt. AAO x 4, WBC 4.77 tmax 99.1, mother at the bedside- interacting with the patient and participating in care.  LR @ 100  Ganciclovir continued, zosyn discontinued and Augmentin started  C/o RUQ abdminal pain-- PRN Lortab 10 mg ans IV Dilaudid given and provided relief  Magnesium citrate and bisacodyl suppository given per orders   US Mesenteric ischemia study performed today  Plans to repeat US mesenteric ischemia study tomorrow to get more clear images--NPO @ midnight  Pt. Ambulating around the unit independently  Safety precautions maintained throughout the shift, call light within reach, and nonslip socks when out of bed.

## 2019-11-29 LAB
ALBUMIN SERPL BCP-MCNC: 2.5 G/DL (ref 3.5–5.2)
ALP SERPL-CCNC: 271 U/L (ref 55–135)
ALT SERPL W/O P-5'-P-CCNC: 18 U/L (ref 10–44)
ANION GAP SERPL CALC-SCNC: 9 MMOL/L (ref 8–16)
AST SERPL-CCNC: 27 U/L (ref 10–40)
BASOPHILS # BLD AUTO: 0.03 K/UL (ref 0–0.2)
BASOPHILS NFR BLD: 0.5 % (ref 0–1.9)
BILIRUB SERPL-MCNC: 0.5 MG/DL (ref 0.1–1)
BUN SERPL-MCNC: 11 MG/DL (ref 6–20)
CALCIUM SERPL-MCNC: 8.7 MG/DL (ref 8.7–10.5)
CHLORIDE SERPL-SCNC: 106 MMOL/L (ref 95–110)
CO2 SERPL-SCNC: 22 MMOL/L (ref 23–29)
CREAT SERPL-MCNC: 1.7 MG/DL (ref 0.5–1.4)
CRP SERPL-MCNC: 128.4 MG/L (ref 0–8.2)
DIFFERENTIAL METHOD: ABNORMAL
EOSINOPHIL # BLD AUTO: 0.3 K/UL (ref 0–0.5)
EOSINOPHIL NFR BLD: 5.1 % (ref 0–8)
ERYTHROCYTE [DISTWIDTH] IN BLOOD BY AUTOMATED COUNT: 15 % (ref 11.5–14.5)
EST. GFR  (AFRICAN AMERICAN): >60 ML/MIN/1.73 M^2
EST. GFR  (NON AFRICAN AMERICAN): 52.9 ML/MIN/1.73 M^2
GLUCOSE SERPL-MCNC: 86 MG/DL (ref 70–110)
HCT VFR BLD AUTO: 31.3 % (ref 40–54)
HGB BLD-MCNC: 9.6 G/DL (ref 14–18)
IMM GRANULOCYTES # BLD AUTO: 0.03 K/UL (ref 0–0.04)
IMM GRANULOCYTES NFR BLD AUTO: 0.5 % (ref 0–0.5)
INR PPP: 1 (ref 0.8–1.2)
LYMPHOCYTES # BLD AUTO: 1.6 K/UL (ref 1–4.8)
LYMPHOCYTES NFR BLD: 24.6 % (ref 18–48)
MCH RBC QN AUTO: 26.9 PG (ref 27–31)
MCHC RBC AUTO-ENTMCNC: 30.7 G/DL (ref 32–36)
MCV RBC AUTO: 88 FL (ref 82–98)
MONOCYTES # BLD AUTO: 0.6 K/UL (ref 0.3–1)
MONOCYTES NFR BLD: 8.9 % (ref 4–15)
NEUTROPHILS # BLD AUTO: 3.9 K/UL (ref 1.8–7.7)
NEUTROPHILS NFR BLD: 60.4 % (ref 38–73)
NRBC BLD-RTO: 0 /100 WBC
O+P STL TRI STN: ABNORMAL
PLATELET # BLD AUTO: 175 K/UL (ref 150–350)
PMV BLD AUTO: 10 FL (ref 9.2–12.9)
POTASSIUM SERPL-SCNC: 3.9 MMOL/L (ref 3.5–5.1)
PROT SERPL-MCNC: 8.3 G/DL (ref 6–8.4)
PROTHROMBIN TIME: 10 SEC (ref 9–12.5)
RBC # BLD AUTO: 3.57 M/UL (ref 4.6–6.2)
SIROLIMUS BLD-MCNC: 7.5 NG/ML (ref 4–20)
SODIUM SERPL-SCNC: 137 MMOL/L (ref 136–145)
WBC # BLD AUTO: 6.5 K/UL (ref 3.9–12.7)

## 2019-11-29 PROCEDURE — 25000003 PHARM REV CODE 250: Performed by: STUDENT IN AN ORGANIZED HEALTH CARE EDUCATION/TRAINING PROGRAM

## 2019-11-29 PROCEDURE — 99233 SBSQ HOSP IP/OBS HIGH 50: CPT | Mod: ,,, | Performed by: HOSPITALIST

## 2019-11-29 PROCEDURE — 63600175 PHARM REV CODE 636 W HCPCS: Performed by: STUDENT IN AN ORGANIZED HEALTH CARE EDUCATION/TRAINING PROGRAM

## 2019-11-29 PROCEDURE — 36415 COLL VENOUS BLD VENIPUNCTURE: CPT

## 2019-11-29 PROCEDURE — 63600175 PHARM REV CODE 636 W HCPCS: Performed by: INTERNAL MEDICINE

## 2019-11-29 PROCEDURE — 80053 COMPREHEN METABOLIC PANEL: CPT

## 2019-11-29 PROCEDURE — 85610 PROTHROMBIN TIME: CPT

## 2019-11-29 PROCEDURE — 80195 ASSAY OF SIROLIMUS: CPT

## 2019-11-29 PROCEDURE — 99233 SBSQ HOSP IP/OBS HIGH 50: CPT | Mod: ,,, | Performed by: INTERNAL MEDICINE

## 2019-11-29 PROCEDURE — 25000003 PHARM REV CODE 250: Performed by: INTERNAL MEDICINE

## 2019-11-29 PROCEDURE — 86140 C-REACTIVE PROTEIN: CPT

## 2019-11-29 PROCEDURE — 85025 COMPLETE CBC W/AUTO DIFF WBC: CPT

## 2019-11-29 PROCEDURE — 99233 PR SUBSEQUENT HOSPITAL CARE,LEVL III: ICD-10-PCS | Mod: ,,, | Performed by: HOSPITALIST

## 2019-11-29 PROCEDURE — 20600001 HC STEP DOWN PRIVATE ROOM

## 2019-11-29 PROCEDURE — 87507 IADNA-DNA/RNA PROBE TQ 12-25: CPT

## 2019-11-29 PROCEDURE — 99233 PR SUBSEQUENT HOSPITAL CARE,LEVL III: ICD-10-PCS | Mod: ,,, | Performed by: INTERNAL MEDICINE

## 2019-11-29 RX ORDER — POLYETHYLENE GLYCOL 3350 17 G/17G
17 POWDER, FOR SOLUTION ORAL DAILY
Status: DISCONTINUED | OUTPATIENT
Start: 2019-11-29 | End: 2019-11-29

## 2019-11-29 RX ORDER — LACTULOSE 10 G/15ML
30 SOLUTION ORAL 3 TIMES DAILY
Status: DISCONTINUED | OUTPATIENT
Start: 2019-11-29 | End: 2019-11-30 | Stop reason: HOSPADM

## 2019-11-29 RX ORDER — LIDOCAINE 50 MG/G
1 PATCH TOPICAL
Status: DISCONTINUED | OUTPATIENT
Start: 2019-11-29 | End: 2019-11-30 | Stop reason: HOSPADM

## 2019-11-29 RX ORDER — SENNOSIDES 8.6 MG/1
8.6 TABLET ORAL DAILY
Status: DISCONTINUED | OUTPATIENT
Start: 2019-11-29 | End: 2019-11-30 | Stop reason: HOSPADM

## 2019-11-29 RX ORDER — POLYETHYLENE GLYCOL 3350 17 G/17G
17 POWDER, FOR SOLUTION ORAL 2 TIMES DAILY
Status: DISCONTINUED | OUTPATIENT
Start: 2019-11-29 | End: 2019-11-30 | Stop reason: HOSPADM

## 2019-11-29 RX ORDER — METRONIDAZOLE 500 MG/1
500 TABLET ORAL EVERY 8 HOURS
Status: DISCONTINUED | OUTPATIENT
Start: 2019-11-29 | End: 2019-11-30 | Stop reason: HOSPADM

## 2019-11-29 RX ORDER — SODIUM CHLORIDE, SODIUM LACTATE, POTASSIUM CHLORIDE, CALCIUM CHLORIDE 600; 310; 30; 20 MG/100ML; MG/100ML; MG/100ML; MG/100ML
INJECTION, SOLUTION INTRAVENOUS CONTINUOUS
Status: DISCONTINUED | OUTPATIENT
Start: 2019-11-29 | End: 2019-11-30 | Stop reason: HOSPADM

## 2019-11-29 RX ADMIN — HYDROCODONE BITARTRATE AND ACETAMINOPHEN 1 TABLET: 10; 325 TABLET ORAL at 10:11

## 2019-11-29 RX ADMIN — SODIUM CHLORIDE, SODIUM LACTATE, POTASSIUM CHLORIDE, AND CALCIUM CHLORIDE: .6; .31; .03; .02 INJECTION, SOLUTION INTRAVENOUS at 10:11

## 2019-11-29 RX ADMIN — GANCICLOVIR SODIUM 155 MG: 500 INJECTION, POWDER, LYOPHILIZED, FOR SOLUTION INTRAVENOUS at 11:11

## 2019-11-29 RX ADMIN — METRONIDAZOLE 500 MG: 500 TABLET ORAL at 07:11

## 2019-11-29 RX ADMIN — HYDROMORPHONE HYDROCHLORIDE 0.5 MG: 1 INJECTION, SOLUTION INTRAMUSCULAR; INTRAVENOUS; SUBCUTANEOUS at 07:11

## 2019-11-29 RX ADMIN — SIROLIMUS 0.5 MG: 0.5 TABLET, SUGAR COATED ORAL at 08:11

## 2019-11-29 RX ADMIN — HYDROCODONE BITARTRATE AND ACETAMINOPHEN 1 TABLET: 10; 325 TABLET ORAL at 06:11

## 2019-11-29 RX ADMIN — HYDROMORPHONE HYDROCHLORIDE 0.5 MG: 1 INJECTION, SOLUTION INTRAMUSCULAR; INTRAVENOUS; SUBCUTANEOUS at 12:11

## 2019-11-29 RX ADMIN — HYDROMORPHONE HYDROCHLORIDE 0.5 MG: 1 INJECTION, SOLUTION INTRAMUSCULAR; INTRAVENOUS; SUBCUTANEOUS at 03:11

## 2019-11-29 RX ADMIN — SENNOSIDES 8.6 MG: 8.6 TABLET, FILM COATED ORAL at 12:11

## 2019-11-29 RX ADMIN — Medication 1 CAPSULE: at 08:11

## 2019-11-29 RX ADMIN — FOLIC ACID 1 MG: 1 TABLET ORAL at 08:11

## 2019-11-29 RX ADMIN — SODIUM CHLORIDE, SODIUM LACTATE, POTASSIUM CHLORIDE, AND CALCIUM CHLORIDE: .6; .31; .03; .02 INJECTION, SOLUTION INTRAVENOUS at 11:11

## 2019-11-29 RX ADMIN — HYDROCODONE BITARTRATE AND ACETAMINOPHEN 1 TABLET: 10; 325 TABLET ORAL at 03:11

## 2019-11-29 RX ADMIN — POLYETHYLENE GLYCOL 3350 17 G: 17 POWDER, FOR SOLUTION ORAL at 08:11

## 2019-11-29 RX ADMIN — POLYETHYLENE GLYCOL 3350 17 G: 17 POWDER, FOR SOLUTION ORAL at 07:11

## 2019-11-29 RX ADMIN — LACTULOSE 30 G: 20 SOLUTION ORAL at 08:11

## 2019-11-29 RX ADMIN — HYDROCODONE BITARTRATE AND ACETAMINOPHEN 1 TABLET: 10; 325 TABLET ORAL at 09:11

## 2019-11-29 RX ADMIN — METRONIDAZOLE 500 MG: 500 TABLET ORAL at 02:11

## 2019-11-29 RX ADMIN — LIDOCAINE 1 PATCH: 50 PATCH TOPICAL at 11:11

## 2019-11-29 RX ADMIN — PANTOPRAZOLE SODIUM 40 MG: 40 TABLET, DELAYED RELEASE ORAL at 08:11

## 2019-11-29 NOTE — TREATMENT PLAN
Hepatology Treatment Plan    Jhonny Diana is a 29 y.o. male admitted to hospital 11/22/2019 (Hospital Day: 8) due to Enteritis of infectious origin. Hepatology following for immunosuppression management.    Lab Results   Component Value Date    TACROLIMUS <1.5 (L) 03/04/2018    TACROLIMUS 1.6 (L) 03/03/2018    TACROLIMUS 3.2 (L) 03/02/2018    TACROLIMUS 3.2 (L) 03/02/2018    TACROLIMUS 3.2 (L) 03/02/2018       Lab Results   Component Value Date    CREATININE 1.7 (H) 11/29/2019    CREATININE 1.7 (H) 11/28/2019    CREATININE 1.9 (H) 11/27/2019       Lab Results   Component Value Date    ALT 18 11/29/2019    AST 27 11/29/2019     (H) 10/26/2017    ALKPHOS 271 (H) 11/29/2019    BILITOT 0.5 11/29/2019    WBC 6.50 11/29/2019    HGB 9.6 (L) 11/29/2019     11/29/2019       Kidney function is improving  Liver enzymes are stable    Plan  - Continue immunosuppression regimen without changes    Immunosuppression Regimen:  Sirolimus: 0.5mg daily    Please notify hepatology on day of discharge to discuss discharge medications and follow up.     Please obtain daily CBC, BMP, LFT, INR, immunosuppressant trough level    Thank you for involving us in the care of Jhonny Diana. Please call with any additional concerns or questions.    Aneudy Barrios MD  Gastroenterology Fellow

## 2019-11-29 NOTE — PLAN OF CARE
-Pt AAOx4  -Vital signs stable  -LR going at 100cc/hr  -Pt to be NPO at 0000 for Mesenteric US in AM.  Pt verbalized understanding  -PRN oxycodone and dilaudid given for pain  -Mom at bedside  -Fall precautions maintained, non skid socks worn  -No acute events/falls/injuries this shift  -Pt aware to call for help with ambulating.    -Bed lowered, locked, siderails up x2, and call bell in reach.    See flowsheet for assessment findings.  Will continue to monitor pt.

## 2019-11-29 NOTE — PLAN OF CARE
Pt. AAO x 4, WBC 6.50, afebrile, mother at the bedside- interacting with the patient and participating in care.  LR @ 75  Ganciclovir and Lactobacillus discontinued  Stool sample positive for giardia lamblia-- flagyl started  C/o RUQ abdminal pain-- PRN Lortab 10 mg, Lortab, and IV Dilaudid given and provided relief-- increased pain when compared to yesterday-- CXR negative  Lactulose and Senna given per orders  US Mesenteric ischemia study planned for today  Pt. Ambulating around the unit independently  Safety precautions maintained throughout the shift, call light within reach, and nonslip socks when out of bed.

## 2019-11-29 NOTE — PLAN OF CARE
Plan is to discharge home with family.       11/29/19 1135   Discharge Reassessment   Assessment Type Discharge Planning Reassessment   Anticipated Discharge Disposition Home   Discharge Plan A Home with family   Discharge Plan B Home Health   DME Needed Upon Discharge  none

## 2019-11-29 NOTE — PROGRESS NOTES
Patient did not notify nurse of leaving the room or floor. Pt. Attempted to call and contact patient twice and mother once    1145 Patient returned to the room

## 2019-11-30 VITALS
BODY MASS INDEX: 21.88 KG/M2 | OXYGEN SATURATION: 97 % | DIASTOLIC BLOOD PRESSURE: 83 MMHG | RESPIRATION RATE: 18 BRPM | WEIGHT: 136.13 LBS | HEIGHT: 66 IN | TEMPERATURE: 98 F | HEART RATE: 92 BPM | SYSTOLIC BLOOD PRESSURE: 137 MMHG

## 2019-11-30 LAB
25(OH)D3+25(OH)D2 SERPL-MCNC: 10 NG/ML (ref 30–96)
ALBUMIN SERPL BCP-MCNC: 2 G/DL (ref 3.5–5.2)
ALP SERPL-CCNC: 213 U/L (ref 55–135)
ALT SERPL W/O P-5'-P-CCNC: 14 U/L (ref 10–44)
ANION GAP SERPL CALC-SCNC: 8 MMOL/L (ref 8–16)
AST SERPL-CCNC: 23 U/L (ref 10–40)
BASOPHILS # BLD AUTO: 0.03 K/UL (ref 0–0.2)
BASOPHILS NFR BLD: 0.7 % (ref 0–1.9)
BILIRUB SERPL-MCNC: 0.3 MG/DL (ref 0.1–1)
BUN SERPL-MCNC: 12 MG/DL (ref 6–20)
CALCIUM SERPL-MCNC: 7.9 MG/DL (ref 8.7–10.5)
CHLORIDE SERPL-SCNC: 108 MMOL/L (ref 95–110)
CO2 SERPL-SCNC: 22 MMOL/L (ref 23–29)
CREAT SERPL-MCNC: 1.6 MG/DL (ref 0.5–1.4)
DIFFERENTIAL METHOD: ABNORMAL
EOSINOPHIL # BLD AUTO: 0.3 K/UL (ref 0–0.5)
EOSINOPHIL NFR BLD: 6.2 % (ref 0–8)
ERYTHROCYTE [DISTWIDTH] IN BLOOD BY AUTOMATED COUNT: 15.1 % (ref 11.5–14.5)
EST. GFR  (AFRICAN AMERICAN): >60 ML/MIN/1.73 M^2
EST. GFR  (NON AFRICAN AMERICAN): 57 ML/MIN/1.73 M^2
GLUCOSE SERPL-MCNC: 91 MG/DL (ref 70–110)
HCT VFR BLD AUTO: 24.1 % (ref 40–54)
HGB BLD-MCNC: 7.6 G/DL (ref 14–18)
IMM GRANULOCYTES # BLD AUTO: 0.02 K/UL (ref 0–0.04)
IMM GRANULOCYTES NFR BLD AUTO: 0.5 % (ref 0–0.5)
INR PPP: 1 (ref 0.8–1.2)
LYMPHOCYTES # BLD AUTO: 1.1 K/UL (ref 1–4.8)
LYMPHOCYTES NFR BLD: 27.1 % (ref 18–48)
MCH RBC QN AUTO: 27.5 PG (ref 27–31)
MCHC RBC AUTO-ENTMCNC: 31.5 G/DL (ref 32–36)
MCV RBC AUTO: 87 FL (ref 82–98)
MONOCYTES # BLD AUTO: 0.3 K/UL (ref 0.3–1)
MONOCYTES NFR BLD: 7.8 % (ref 4–15)
NEUTROPHILS # BLD AUTO: 2.4 K/UL (ref 1.8–7.7)
NEUTROPHILS NFR BLD: 57.7 % (ref 38–73)
NRBC BLD-RTO: 0 /100 WBC
PLATELET # BLD AUTO: 138 K/UL (ref 150–350)
PMV BLD AUTO: 9.6 FL (ref 9.2–12.9)
POTASSIUM SERPL-SCNC: 3.9 MMOL/L (ref 3.5–5.1)
PROT SERPL-MCNC: 6.7 G/DL (ref 6–8.4)
PROTHROMBIN TIME: 10.8 SEC (ref 9–12.5)
RBC # BLD AUTO: 2.76 M/UL (ref 4.6–6.2)
SIROLIMUS BLD-MCNC: 5.9 NG/ML (ref 4–20)
SODIUM SERPL-SCNC: 138 MMOL/L (ref 136–145)
WBC # BLD AUTO: 4.21 K/UL (ref 3.9–12.7)

## 2019-11-30 PROCEDURE — 25000003 PHARM REV CODE 250: Performed by: STUDENT IN AN ORGANIZED HEALTH CARE EDUCATION/TRAINING PROGRAM

## 2019-11-30 PROCEDURE — 80053 COMPREHEN METABOLIC PANEL: CPT

## 2019-11-30 PROCEDURE — 99239 HOSP IP/OBS DSCHRG MGMT >30: CPT | Mod: ,,, | Performed by: HOSPITALIST

## 2019-11-30 PROCEDURE — 80195 ASSAY OF SIROLIMUS: CPT

## 2019-11-30 PROCEDURE — 85025 COMPLETE CBC W/AUTO DIFF WBC: CPT

## 2019-11-30 PROCEDURE — 63600175 PHARM REV CODE 636 W HCPCS: Performed by: INTERNAL MEDICINE

## 2019-11-30 PROCEDURE — 85610 PROTHROMBIN TIME: CPT

## 2019-11-30 PROCEDURE — 36415 COLL VENOUS BLD VENIPUNCTURE: CPT

## 2019-11-30 PROCEDURE — 63600175 PHARM REV CODE 636 W HCPCS: Performed by: STUDENT IN AN ORGANIZED HEALTH CARE EDUCATION/TRAINING PROGRAM

## 2019-11-30 PROCEDURE — 25000003 PHARM REV CODE 250: Performed by: INTERNAL MEDICINE

## 2019-11-30 PROCEDURE — 84207 ASSAY OF VITAMIN B-6: CPT

## 2019-11-30 PROCEDURE — 99233 PR SUBSEQUENT HOSPITAL CARE,LEVL III: ICD-10-PCS | Mod: ,,, | Performed by: INTERNAL MEDICINE

## 2019-11-30 PROCEDURE — 99233 SBSQ HOSP IP/OBS HIGH 50: CPT | Mod: ,,, | Performed by: INTERNAL MEDICINE

## 2019-11-30 PROCEDURE — 84425 ASSAY OF VITAMIN B-1: CPT

## 2019-11-30 PROCEDURE — 99239 PR HOSPITAL DISCHARGE DAY,>30 MIN: ICD-10-PCS | Mod: ,,, | Performed by: HOSPITALIST

## 2019-11-30 PROCEDURE — 82306 VITAMIN D 25 HYDROXY: CPT

## 2019-11-30 RX ORDER — ERGOCALCIFEROL 1.25 MG/1
50000 CAPSULE ORAL
Qty: 12 CAPSULE | Refills: 0 | Status: SHIPPED | OUTPATIENT
Start: 2019-11-30 | End: 2021-09-22

## 2019-11-30 RX ORDER — SIROLIMUS 0.5 MG/1
0.5 TABLET, FILM COATED ORAL DAILY
Qty: 30 TABLET | Refills: 3 | Status: SHIPPED | OUTPATIENT
Start: 2019-11-30 | End: 2021-07-21

## 2019-11-30 RX ORDER — PROMETHAZINE HYDROCHLORIDE 25 MG/1
25 TABLET ORAL EVERY 6 HOURS PRN
Qty: 30 TABLET | Refills: 0 | Status: SHIPPED | OUTPATIENT
Start: 2019-11-30 | End: 2021-09-22

## 2019-11-30 RX ORDER — METRONIDAZOLE 500 MG/1
500 TABLET ORAL EVERY 8 HOURS
Qty: 17 TABLET | Refills: 0 | Status: SHIPPED | OUTPATIENT
Start: 2019-11-30 | End: 2019-11-30 | Stop reason: HOSPADM

## 2019-11-30 RX ORDER — HYDROMORPHONE HYDROCHLORIDE 2 MG/1
2 TABLET ORAL EVERY 6 HOURS PRN
Qty: 12 TABLET | Refills: 0 | Status: SHIPPED | OUTPATIENT
Start: 2019-11-30 | End: 2019-12-03

## 2019-11-30 RX ORDER — METRONIDAZOLE 500 MG/1
500 TABLET ORAL EVERY 8 HOURS
Qty: 18 TABLET | Refills: 0 | Status: ON HOLD | OUTPATIENT
Start: 2019-11-30 | End: 2019-12-11 | Stop reason: HOSPADM

## 2019-11-30 RX ADMIN — PANTOPRAZOLE SODIUM 40 MG: 40 TABLET, DELAYED RELEASE ORAL at 08:11

## 2019-11-30 RX ADMIN — HYDROMORPHONE HYDROCHLORIDE 0.5 MG: 1 INJECTION, SOLUTION INTRAMUSCULAR; INTRAVENOUS; SUBCUTANEOUS at 11:11

## 2019-11-30 RX ADMIN — HYDROCODONE BITARTRATE AND ACETAMINOPHEN 1 TABLET: 10; 325 TABLET ORAL at 04:11

## 2019-11-30 RX ADMIN — SENNOSIDES 8.6 MG: 8.6 TABLET, FILM COATED ORAL at 08:11

## 2019-11-30 RX ADMIN — FOLIC ACID 1 MG: 1 TABLET ORAL at 08:11

## 2019-11-30 RX ADMIN — METRONIDAZOLE 500 MG: 500 TABLET ORAL at 02:11

## 2019-11-30 RX ADMIN — LIDOCAINE 1 PATCH: 50 PATCH TOPICAL at 11:11

## 2019-11-30 RX ADMIN — SIROLIMUS 0.5 MG: 0.5 TABLET, SUGAR COATED ORAL at 08:11

## 2019-11-30 RX ADMIN — HYDROCODONE BITARTRATE AND ACETAMINOPHEN 1 TABLET: 10; 325 TABLET ORAL at 08:11

## 2019-11-30 RX ADMIN — METRONIDAZOLE 500 MG: 500 TABLET ORAL at 04:11

## 2019-11-30 RX ADMIN — HYDROCODONE BITARTRATE AND ACETAMINOPHEN 1 TABLET: 10; 325 TABLET ORAL at 02:11

## 2019-11-30 NOTE — PROGRESS NOTES
"[facility] Hospital Medicine  Progress Note    Patient Name: Jhonny Diana  MRN: [id number]  Patient Class: IP- Inpatient   Admission Date: 11/22/2019  Length of Stay: 6 days  Attending Physician: Michelle Rowe MD  Primary Care Provider: Primary Doctor [facility] Medicine Team: [facility] HOSP MED 1 Bucky Jose MD    Subjective:     Principal Problem:Enteritis of infectious origin        HPI:  30 yo male patient with a history of HTN and Liver transplant performed 10/19/17 secondary to acute alcoholic hepatitis with multiple post-op complications including biliary stenosis s/p multiple ERCPs, adhesions, abdominal infections and prolonged LAURA requiring dialysis, who presents today as a transfer from [place], [place] due to R sided abdominal pain and fever.  His symptoms began around 11/11 with abdominal pain located on the right side, described as a colicky-type of pain of variable intensity, ranging from 3/10 to 9/10 accompanied by fever, poor appetite, nausea and 1 episode of vomiting. For this reason he presented to [facility] on 11/18/19 where per transfer note  he was hypertensive and tachycardic. He was started on empiric zosyn.  Imaging CT non-contrast and MRI abdomen demonstrated signs of enteritis(mucosal thickening) small right pleural effusion, splenomegaly.  An U/S abdomen with comment of "possible cirrhosis". He also developed a drop in h/h needing 2 PRBC transfusions along with mild stable Cr elevation (1.8-2.1). The patient was supposed to be transferred but due to insurance problems he left [place] and was driven here by his mother.  In the ER his Tmax was 99.4 HR 88 /83 Sats 98% RA. Currently he complains of pain 6/10 located to the right side that travels to his right flank. Denies any changes in his bowel habits, no constipation or diarrhea. Denies having a paracentesis at OSH.     Overview/Hospital Course:  Patient was admitted for abdominal pain. MRI Abd w/o contrast at " OSH showed changes consistent with enteritis. ID and hepatology consulted. Patient was started on zosyn on admission. CMV pending. Started on ganciclovir with plan to undergo EGD performed and bcx was done. CMV was negative. Awaiting CMV biopsies. Patient complained of worsening pain and had a fever spike, so zosyn was restarted, CT Abd showed significant constipation in colon. Patient was started on laxatives to facilitate bowel movements. Gen surg was consulted for concerns of adhesions causing bowel obstruction or worsening abd pain given hx of abd surgeries and adhesions. They expressed no concern for bowel obstrutcion per their imaging read and review of the case. US Mesenteric Ischemia was limited due to excess abdominal gas. Patient continued to receive laxatives. Giardia was positive on stool assay, ganciclovir was d/c and metronidazole was started. EBV <100.     Interval History: NAEO. Patient states pain is improved from prior, however he does have pain with deep breaths in the right subcostal area. Had a few bowel movements overnight. Remained afebrile overnight. Stool assay positive for giardia.     Review of Systems   Constitutional: Negative for activity change, appetite change, chills and fever.   Respiratory: Negative for apnea, cough, chest tightness, shortness of breath and wheezing.    Cardiovascular: Negative for chest pain, palpitations and leg swelling.   Gastrointestinal: Positive for abdominal pain and diarrhea. Negative for abdominal distention.   Genitourinary: Negative for difficulty urinating and dysuria.   Musculoskeletal: Negative for arthralgias, back pain and myalgias.   Skin: Negative for color change, pallor and rash.   Neurological: Negative for dizziness, tremors, weakness, light-headedness and headaches.   Psychiatric/Behavioral: Negative for agitation, behavioral problems and confusion.     Objective:     Vital Signs (Most Recent):  Temp: 98.6 °F (37 °C) (11/29/19 1943)  Pulse:  88 (11/29/19 1943)  Resp: 18 (11/29/19 1943)  BP: (!) 157/96 (11/29/19 1943)  SpO2: 100 % (11/29/19 1943) Vital Signs (24h Range):  Temp:  [97.5 °F (36.4 °C)-98.6 °F (37 °C)] 98.6 °F (37 °C)  Pulse:  [79-89] 88  Resp:  [18-19] 18  SpO2:  [96 %-100 %] 100 %  BP: (131-157)/(76-96) 157/96     Weight: 61.7 kg (136 lb 2.1 oz)  Body mass index is 21.97 kg/m².    Intake/Output Summary (Last 24 hours) at 11/29/2019 2001  Last data filed at 11/29/2019 1700  Gross per 24 hour   Intake 3788.75 ml   Output 1670 ml   Net 2118.75 ml      Physical Exam   Constitutional: He is oriented to person, place, and time. He appears well-developed and well-nourished.   Appears uncomfortable    HENT:   Head: Normocephalic and atraumatic.   Mouth/Throat: Oropharynx is clear and moist.   Eyes: Conjunctivae and EOM are normal.   Neck: Normal range of motion.   Cardiovascular: Normal rate, regular rhythm, normal heart sounds and intact distal pulses.   No murmur heard.  Pulmonary/Chest: Effort normal and breath sounds normal. He has no wheezes. He has no rales.   Abdominal: Soft. Bowel sounds are normal. He exhibits no distension and no mass. There is tenderness (Most notably in RUQ ). A hernia (umbilical reducible hernia noted) is present.   Musculoskeletal: Normal range of motion. He exhibits no edema or tenderness.   Neurological: He is alert and oriented to person, place, and time.   Skin: Skin is warm and dry. No rash noted. No erythema.   Psychiatric: He has a normal mood and affect. His behavior is normal. Thought content normal.       Significant Labs:   BMP:   Recent Labs   Lab 11/29/19  0614   GLU 86      K 3.9      CO2 22*   BUN 11   CREATININE 1.7*   CALCIUM 8.7     CBC:   Recent Labs   Lab 11/28/19  0727 11/29/19  0613   WBC 4.77 6.50   HGB 8.1* 9.6*   HCT 26.6* 31.3*   * 175     CMP:   Recent Labs   Lab 11/28/19  0727 11/29/19  0614    137   K 3.9 3.9    106   CO2 22* 22*   GLU 72 86   BUN 12 11    CREATININE 1.7* 1.7*   CALCIUM 8.3* 8.7   PROT 7.3 8.3   ALBUMIN 2.2* 2.5*   BILITOT 0.7 0.5   ALKPHOS 220* 271*   AST 25 27   ALT 13 18   ANIONGAP 10 9   EGFRNONAA 52.9* 52.9*       Significant Imaging: I have reviewed all pertinent imaging results/findings within the past 24 hours.      Assessment/Plan:      * Enteritis of infectious origin  Patient presented with abdominal pain suspicious for CMV colitis. MRI of the abdomen w/o contrast at OSH shows mild small bowel mucosa thickening  consistent with enteritis. Suspicious for CMV in this immunosuppressed patient   Bx taken during EGD of  normal duodenum - on prior imaging, the 2nd and 3rd portion of duodenum and jejunum were affected, and EGD did not go this distal - so if Bx are negative, this does not rule out CMV colitis  ID consulted- Continue  on ganciclovir. Started on fluids for renal protection. Patient reported an irritable mood since GCV was started. Discussed with ID- this is not a well known adverse effect and is the only anti-CMV option in house. Likely related to anesthesia from EGD? Will continue to monitor.   Patient with worsening abdominal pain and 2 bouts of diarrhea(which he states is typical for him due to antibiotics), also noted to have spiked a fever overnight s/p blood transfusion. Concern for worsening symptoms since zosyn was d/c. Discussed with ID who stated will like to have patient continue with CMV treatment for 4 days.  CT abd shows significant amount of stool in large colon  Pain could possibly be due to adhesions from prior surgeries. However that is more so a diagnosis of exclusion. Discussed Gen surgery who recommend continued laxatives as patient doesn't appear obstructed per their review.   Giardia positive on stool ova and parasites     Plan  Started on metronidazole to cover giardia  Discontinued zosyn and GCV   Continue with laxatives to encourage adequate bowel movements  US Mesenteric ischemia limited due to abdominal  gas. Will repeat today.   F/u GI pathogens stool studies   Per ID: 'f/u endoscopy biopsies (CMV quant notably negative but ~20% of cases of GI tissue invasive disease are not accompanied by viremia) -- of note, biopsies were unable to be taken of enhancing distal duodenum so may not be representative if unremarkable'            Anemia of chronic disease  Per chart review, Hb had been stable in the 10.5-12 range for the past months. H/H dropped at OSH with no obvious source of bleeding and needed 2 tranfusions.  - Although reliability will no be optimal since he was already transfused.   Claudia, LDH, T bili wnl. Haptoglobin elevated likely due to inflammatory process  Iron studies consistent with anemia of chronic disease and bone marrow suppression from active inflammatory process  Transfuse if Hgb <7.    H/H 7.2 in light of symptoms (lightheadness, fatigue). S/p 1 unit of pRBCs, repeat H/H 8.1/25.5. 9.6 today  -Stable       Right upper quadrant abdominal pain  28 yo male with Liver transplant in 2017 secondary to alcoholic hepatitis and multiple post-op complications including biliary duct stricture s/p multiple ERCPs w/stent placement, infections and prolonged LAURA requiring dialysis, coming for severe right sided abdominal pain with fever. Per notes, imaging from OSH CT non-contrast and MRI abdomen demonstrate signs of enteritis(mucosal thickening) small right pleural effusion and splenomegaly.    -Discussed with General surgery. Per their review pateint doesn't appear obstructed and no physical exam findings or imaging findings concerning for surgical intervention needs. Recommended to continue with laxatives. Will touch base if abdominal pain doesn't improve despite efforts given his prior hx of adhesions.         Weakness        Enteritis        Umbilical hernia  Reducible. Appears stable      Acute kidney injury superimposed on CKD  Pt does have CKD developed after OLT, likely in the setting of multiple  medications, antibiotics, hypotension, CNI toxicity and infections. Needed multiple sessions of dialysis and finally slowly recovered. Last sCr 1.4 in 07/2019. sCr at 2.3 on admission, LAURA from current abdominal pathology.  Improving.  sCr currently at 1.7  -improving      Thrombocytopenia  Pt does have a history of thrombocytopenia and underwent bone marrow biopsy in 2018 thought to be from meds vs peripheral destruction.   -stable    Alcohol use disorder, severe, in early remission  Patient states he has not taken alcohol recently.    Constipation  As seen on CT of abdomen  -Continue with laxatives      Long-term use of immunosuppressant medication  Per records patient was on cyclosporine and due to concern for TMA he was switched to rapamune in 05/2018    -Hepatology following  -Sirolimus dosages per hepatology recs   -daily CBC, BMP, LFT, INR, immunosuppressant trough level        S/P liver transplant  Performed in 10/19/19 secondary to alcoholic hepatitis. LFTs not concerning for rejection.         VTE Risk Mitigation (From admission, onward)         Ordered     enoxaparin injection 40 mg  Daily      11/23/19 0300                      Bucky Jose MD  Department of Hospital Medicine   [facility]

## 2019-11-30 NOTE — ASSESSMENT & PLAN NOTE
Per records patient was on cyclosporine and due to concern for TMA he was switched to rapamune in 05/2018    -Hepatology following  -Sirolimus dosages per hepatology recs   -daily CBC, BMP, LFT, INR, immunosuppressant trough level       2 = assistive person

## 2019-11-30 NOTE — PLAN OF CARE
-Pt AAOx4  -Vital signs stable  -LR going at 75cc/hr  -Pt went for Mesenteric US   -Stool cx came back positive for giardia; PO flagyl started  -PRN hydrocodone and dilaudid given for pain  -Mom at bedside  -Fall precautions maintained, non skid socks worn  -No acute events/falls/injuries this shift  -Pt aware to call for help with ambulating.    -Bed lowered, locked, siderails up x2, and call bell in reach.     See flowsheet for assessment findings.  Will continue to monitor pt.

## 2019-11-30 NOTE — PROGRESS NOTES
[facility]  Infectious Disease  Progress Note    Patient Name: Jhonny Diana  MRN: [id number]  Admission Date: 11/22/2019  Length of Stay: 7 days  Attending Physician: Michelle Rowe MD  Primary Care Provider: Primary Doctor No    Isolation Status: No active isolations  Assessment/Plan:      Enteritis  30yo man w/a history of asthma and alcoholic cirrhosis (c/b HE, EV, portal HTN, and HRS; s/p DDLT 10/19/2017, CMV D+/R+, steroid induction, on maintenance sirolimus; c/b seizures, LAURA, superificial wound infection s/p wound vac closure, culture negative peritonitis due to suspected indolent biliary leakage s/p repeated sphincterotomy/biliary stent placement, subsequent loculated RLQ collection s/p perc-drainage and prolonged empiric antibiotics course, gastritis/duodenal ulcer 3/6/2018, and 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     Stool ova and parasites was positive for Giardia.  This may be the cause of his abdominal pain.  Giardia is not a typical cause of fever but this may be an unusual presentation in this immunocompromised patient.  Mesenteric ultrasound of the abdomen was unremarkable.  Recommendations are as follows;    - Metronidazole 500 mg po tid.  Anticipate 7 day course.    - Follow up results of stool pathogens pcr panel.    - Follow up results of pathology from biopsy of stomach and duodenum obtained via EGD    - Follow up results of stool cultures.    - Monitor closely for any signs of improvement.        Anticipated Disposition: TBD    Thank you for your consult. I will follow-up with patient. Please contact us  if you have any additional questions.    Mika Guadarrama MD  Infectious Disease  [facility]    Subjective:     Principal Problem:Enteritis of infectious origin    HPI: Mr. 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  He was admitted at an outside hospital for right sided abdominal pain and subjective fevers.  MRI abdomen at the outside hospital apparently showed enteritis.  He was transferred to [facility] for a higher level of care.  We are consulted to assist with his care.  Stool ova and parasites are positive for Giardia lamblia.    Interval History:     Continues to have abdominal pain.    Review of Systems   Gastrointestinal: Positive for abdominal pain.   All other systems reviewed and are negative.    Objective:     Vital Signs (Most Recent):  Temp: 98.3 °F (36.8 °C) (11/30/19 0439)  Pulse: 93 (11/30/19 0439)  Resp: 16 (11/30/19 0439)  BP: (!) 149/94 (11/30/19 0439)  SpO2: 99 % (11/30/19 0439) Vital Signs (24h Range):  Temp:  [97.5 °F (36.4 °C)-98.6 °F (37 °C)] 98.3 °F (36.8 °C)  Pulse:  [] 93  Resp:  [16-19] 16  SpO2:  [97 %-100 %] 99 %  BP: (136-157)/(76-96) 149/94     Weight: 61.7 kg (136 lb 2.1 oz)  Body mass index is 21.97 kg/m².    Estimated Creatinine Clearance: 55.5 mL/min (A) (based on SCr of 1.7 mg/dL (H)).    Physical Exam   Constitutional: He is oriented to person, place, and time. He appears well-developed and well-nourished. No distress.   HENT:   Head: Normocephalic and atraumatic.   Right Ear:  External ear normal.   Left Ear: External ear normal.   Nose: Nose normal.   Mouth/Throat: Oropharynx is clear and moist. No oropharyngeal exudate.   Eyes: Pupils are equal, round, and reactive to light. Conjunctivae and EOM are normal. Right eye exhibits no discharge. Left eye exhibits no discharge. No scleral icterus.   Neck: Normal range of motion. Neck supple. No JVD present. No tracheal deviation present. No thyromegaly present.   Cardiovascular: Normal rate, regular rhythm, normal heart sounds and intact distal pulses. Exam reveals no gallop and no friction rub.   No murmur heard.  Pulmonary/Chest: Effort normal and breath sounds normal. No stridor. No respiratory distress. He has no wheezes. He has no rales. He exhibits no tenderness.   Abdominal: Soft. Bowel sounds are normal. He exhibits no distension and no mass. There is tenderness. There is no rebound and no guarding.   Musculoskeletal: Normal range of motion. He exhibits no edema or tenderness.   Lymphadenopathy:     He has no cervical adenopathy.   Neurological: He is alert and oriented to person, place, and time. He has normal reflexes. He displays normal reflexes. No cranial nerve deficit. He exhibits normal muscle tone. Coordination normal.   Skin: Skin is warm. No rash noted. He is not diaphoretic. No erythema. No pallor.   Psychiatric: He has a normal mood and affect. His behavior is normal. Judgment and thought content normal.   Nursing note and vitals reviewed.      Significant Labs:   Microbiology Results (last 7 days)     Procedure Component Value Units Date/Time    Blood culture [[id number]] Collected:  11/27/19 0954    Order Status:  Completed Specimen:  Blood Updated:  11/29/19 1212     Blood Culture, Routine No Growth to date      No Growth to date      No Growth to date    Blood culture [[id number]] Collected:  11/27/19 0944    Order Status:  Completed Specimen:  Blood Updated:  11/29/19 1212     Blood Culture, Routine No Growth to date       No Growth to date      No Growth to date    Stool culture [[id number]] Collected:  11/28/19 1019    Order Status:  Completed Specimen:  Stool Updated:  11/29/19 0940     Stool Culture Culture in progress    Blood culture [[id number]] Collected:  11/23/19 1953    Order Status:  Completed Specimen:  Blood Updated:  11/28/19 2212     Blood Culture, Routine No growth after 5 days.    Blood culture [[id number]] Collected:  11/23/19 1953    Order Status:  Completed Specimen:  Blood Updated:  11/28/19 2212     Blood Culture, Routine No growth after 5 days.    E. coli 0157 antigen [[id number]] Collected:  11/28/19 1019    Order Status:  No result Specimen:  Stool Updated:  11/28/19 1108    Blood culture #1 [[id number]] Collected:  11/22/19 2140    Order Status:  Completed Specimen:  Blood from Peripheral, Hand, Right Updated:  11/27/19 2312     Blood Culture, Routine No growth after 5 days.    Narrative:       Blood Culture #1    Blood culture #2 [[id number]] Collected:  11/22/19 2155    Order Status:  Completed Specimen:  Blood from Peripheral, Antecubital, Left Updated:  11/27/19 2312     Blood Culture, Routine No growth after 5 days.    Narrative:       Blood Culture #2          Significant Imaging: I have reviewed all pertinent imaging results/findings within the past 24 hours.

## 2019-11-30 NOTE — SUBJECTIVE & OBJECTIVE
Interval History: Afebrile. Continues to have abd pain mostly RUQ and RLQ. Stool ova and parasites w/ giardia cysts and trophozoites. Started on flagyl.    Review of Systems   Constitutional: Positive for activity change. Negative for appetite change, chills and fever.   Respiratory: Negative for apnea, cough, chest tightness, shortness of breath and wheezing.    Cardiovascular: Negative for chest pain, palpitations and leg swelling.   Gastrointestinal: Positive for abdominal pain and diarrhea. Negative for abdominal distention.   Genitourinary: Negative for difficulty urinating and dysuria.   Musculoskeletal: Negative for arthralgias, back pain and myalgias.   Skin: Negative for color change, pallor and rash.   Neurological: Negative for dizziness, tremors, weakness, light-headedness and headaches.   Psychiatric/Behavioral: Negative for agitation, behavioral problems and confusion.     Objective:     Vital Signs (Most Recent):  Temp: 98.4 °F (36.9 °C) (11/29/19 1620)  Pulse: 86 (11/29/19 1620)  Resp: 18 (11/29/19 1620)  BP: (!) 140/85 (11/29/19 1620)  SpO2: 97 % (11/29/19 1620) Vital Signs (24h Range):  Temp:  [97.5 °F (36.4 °C)-99.8 °F (37.7 °C)] 98.4 °F (36.9 °C)  Pulse:  [79-92] 86  Resp:  [18-19] 18  SpO2:  [96 %-100 %] 97 %  BP: (131-155)/(76-95) 140/85     Weight: 61.7 kg (136 lb 2.1 oz)  Body mass index is 21.97 kg/m².    Estimated Creatinine Clearance: 55.5 mL/min (A) (based on SCr of 1.7 mg/dL (H)).    Physical Exam   Constitutional: He is oriented to person, place, and time. No distress.   HENT:   Head: Normocephalic and atraumatic.   Mouth/Throat: No oropharyngeal exudate.   Eyes: Conjunctivae are normal. Right eye exhibits no discharge. Left eye exhibits no discharge. No scleral icterus.   Cardiovascular: Normal rate, regular rhythm, normal heart sounds and intact distal pulses.   Pulmonary/Chest: Effort normal and breath sounds normal. No stridor. No respiratory distress. He has no wheezes. He has no  rales.   Abdominal: Soft. Bowel sounds are normal. He exhibits no distension. There is tenderness. There is no guarding.   Mostly RUQ & RLQ   Musculoskeletal: He exhibits no edema or tenderness.   Lymphadenopathy:     He has no cervical adenopathy.   Neurological: He is alert and oriented to person, place, and time.   Skin: Skin is warm. No rash noted. He is not diaphoretic.   Psychiatric: He has a normal mood and affect. His behavior is normal.   Nursing note and vitals reviewed.      Significant Labs:   Blood Culture:   Recent Labs   Lab 11/22/19 2140 11/22/19 2155 11/23/19 1953 11/27/19 0944 11/27/19  0954   LABBLOO No growth after 5 days. No growth after 5 days. No growth after 5 days.  No growth after 5 days. No Growth to date  No Growth to date  No Growth to date No Growth to date  No Growth to date  No Growth to date     CBC:   Recent Labs   Lab 11/28/19  0727 11/29/19  0613   WBC 4.77 6.50   HGB 8.1* 9.6*   HCT 26.6* 31.3*   * 175     CMP:   Recent Labs   Lab 11/28/19  0727 11/29/19  0614    137   K 3.9 3.9    106   CO2 22* 22*   GLU 72 86   BUN 12 11   CREATININE 1.7* 1.7*   CALCIUM 8.3* 8.7   PROT 7.3 8.3   ALBUMIN 2.2* 2.5*   BILITOT 0.7 0.5   ALKPHOS 220* 271*   AST 25 27   ALT 13 18   ANIONGAP 10 9   EGFRNONAA 52.9* 52.9*     Microbiology Results (last 7 days)     Procedure Component Value Units Date/Time    Blood culture [[id number]] Collected:  11/27/19 0954    Order Status:  Completed Specimen:  Blood Updated:  11/29/19 1212     Blood Culture, Routine No Growth to date      No Growth to date      No Growth to date    Blood culture [[id number]] Collected:  11/27/19 0944    Order Status:  Completed Specimen:  Blood Updated:  11/29/19 1212     Blood Culture, Routine No Growth to date      No Growth to date      No Growth to date    Stool culture [[id number]] Collected:  11/28/19 1019    Order Status:  Completed Specimen:  Stool Updated:  11/29/19 0940     Stool Culture Culture  in progress    Blood culture [[id number]] Collected:  11/23/19 1953    Order Status:  Completed Specimen:  Blood Updated:  11/28/19 2212     Blood Culture, Routine No growth after 5 days.    Blood culture [[id number]] Collected:  11/23/19 1953    Order Status:  Completed Specimen:  Blood Updated:  11/28/19 2212     Blood Culture, Routine No growth after 5 days.    E. coli 0157 antigen [[id number]] Collected:  11/28/19 1019    Order Status:  No result Specimen:  Stool Updated:  11/28/19 1108    Blood culture #1 [[id number]] Collected:  11/22/19 2140    Order Status:  Completed Specimen:  Blood from Peripheral, Hand, Right Updated:  11/27/19 2312     Blood Culture, Routine No growth after 5 days.    Narrative:       Blood Culture #1    Blood culture #2 [[id number]] Collected:  11/22/19 2155    Order Status:  Completed Specimen:  Blood from Peripheral, Antecubital, Left Updated:  11/27/19 2312     Blood Culture, Routine No growth after 5 days.    Narrative:       Blood Culture #2          Significant Imaging: I have reviewed all pertinent imaging results/findings within the past 24 hours.

## 2019-11-30 NOTE — ASSESSMENT & PLAN NOTE
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 He is stable on empiric GCV with differential most notable for CMV (moreso than community acquired viral infection that would have run its course likely by now) vs ischemic insult vs another OI (PTLD among others). He is stable while awaiting additional testing.    - Given stool ova and parasites positive for Giardia started on flagyl. Will d/c ganciclovir for now while awaiting biopsy and observe response on flagyl alone.  - will f/u endoscopy biopsies (CMV quant notably negative but ~20% of cases of GI tissue invasive disease are not accompanied by viremia) -- of note, biopsies were unable to be taking of enhancing distal duodenum so may not be representative if unremarkable  - repeat CT A/P shows significant constipation -- continued bowel regimen  - F/u stool culture and EBV quant  - mesenteric doppler negative but did not capture all vessels of interest -- if symptoms/fevers do not resolve, can consider repeating after constipation is adequately treated

## 2019-11-30 NOTE — ASSESSMENT & PLAN NOTE
Patient presented with abdominal pain suspicious for CMV colitis. MRI of the abdomen w/o contrast at OSH shows mild small bowel mucosa thickening  consistent with enteritis. Suspicious for CMV in this immunosuppressed patient   Bx taken during EGD of  normal duodenum - on prior imaging, the 2nd and 3rd portion of duodenum and jejunum were affected, and EGD did not go this distal - so if Bx are negative, this does not rule out CMV colitis  ID consulted- Continue  on ganciclovir. Started on fluids for renal protection. Patient reported an irritable mood since GCV was started. Discussed with ID- this is not a well known adverse effect and is the only anti-CMV option in house. Likely related to anesthesia from EGD? Will continue to monitor.   Patient with worsening abdominal pain and 2 bouts of diarrhea(which he states is typical for him due to antibiotics), also noted to have spiked a fever overnight s/p blood transfusion. Concern for worsening symptoms since zosyn was d/c. Discussed with ID who stated will like to have patient continue with CMV treatment for 4 days.  CT abd shows significant amount of stool in large colon  Pain could possibly be due to adhesions from prior surgeries. However that is more so a diagnosis of exclusion. Discussed Gen surgery who recommend continued laxatives as patient doesn't appear obstructed per their review. CXR is clear   Giardia positive on stool ova and parasites     Plan  Started on metronidazole to cover giardia  Discontinued zosyn and GCV   Continue with laxatives to encourage adequate bowel movements  US Mesenteric ischemia limited due to abdominal gas. Will repeat today.   F/u GI pathogens stool studies   Per ID: 'f/u endoscopy biopsies (CMV quant notably negative but ~20% of cases of GI tissue invasive disease are not accompanied by viremia) -- of note, biopsies were unable to be taken of enhancing distal duodenum so may not be representative if unremarkable'

## 2019-11-30 NOTE — ASSESSMENT & PLAN NOTE
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 (close to 2wks into admission) with low grade temps (peak 100F), N/V, and RUQ abdominal pain due to enteritis of unknown etiology (seen on MRI abdomen 11/21 in distal duodenum at OSH; notable no significant ascites, fluid collection, or pancreatitis on that study; patient denies associated diarrhea or bloody stools).     Stool ova and parasites was positive for Giardia.  This may be the cause of his abdominal pain.  Giardia is not a typical cause of fever but this may be an unusual presentation in this immunocompromised patient.  Recommendations are as follows;    - Metronidazole 500 mg po tid.  Anticipate 7 day course.    - Follow up results of stool pathogens pcr panel.    - Follow up results of pathology from biopsy of stomach and duodenum obtained via EGD    - Follow up results of stool cultures.    - Monitor closely for any signs of improvement.

## 2019-11-30 NOTE — SUBJECTIVE & OBJECTIVE
Interval History: NAEO. Patient states pain is improved from prior, however he does have pain with deep breaths in the right subcostal area. Had a few bowel movements overnight. Remained afebrile overnight. Stool assay positive for giardia.     Review of Systems   Constitutional: Negative for activity change, appetite change, chills and fever.   Respiratory: Negative for apnea, cough, chest tightness, shortness of breath and wheezing.    Cardiovascular: Negative for chest pain, palpitations and leg swelling.   Gastrointestinal: Positive for abdominal pain and diarrhea. Negative for abdominal distention.   Genitourinary: Negative for difficulty urinating and dysuria.   Musculoskeletal: Negative for arthralgias, back pain and myalgias.   Skin: Negative for color change, pallor and rash.   Neurological: Negative for dizziness, tremors, weakness, light-headedness and headaches.   Psychiatric/Behavioral: Negative for agitation, behavioral problems and confusion.     Objective:     Vital Signs (Most Recent):  Temp: 98.6 °F (37 °C) (11/29/19 1943)  Pulse: 88 (11/29/19 1943)  Resp: 18 (11/29/19 1943)  BP: (!) 157/96 (11/29/19 1943)  SpO2: 100 % (11/29/19 1943) Vital Signs (24h Range):  Temp:  [97.5 °F (36.4 °C)-98.6 °F (37 °C)] 98.6 °F (37 °C)  Pulse:  [79-89] 88  Resp:  [18-19] 18  SpO2:  [96 %-100 %] 100 %  BP: (131-157)/(76-96) 157/96     Weight: 61.7 kg (136 lb 2.1 oz)  Body mass index is 21.97 kg/m².    Intake/Output Summary (Last 24 hours) at 11/29/2019 2001  Last data filed at 11/29/2019 1700  Gross per 24 hour   Intake 3788.75 ml   Output 1670 ml   Net 2118.75 ml      Physical Exam   Constitutional: He is oriented to person, place, and time. He appears well-developed and well-nourished.   Appears uncomfortable    HENT:   Head: Normocephalic and atraumatic.   Mouth/Throat: Oropharynx is clear and moist.   Eyes: Conjunctivae and EOM are normal.   Neck: Normal range of motion.   Cardiovascular: Normal rate, regular  rhythm, normal heart sounds and intact distal pulses.   No murmur heard.  Pulmonary/Chest: Effort normal and breath sounds normal. He has no wheezes. He has no rales.   Abdominal: Soft. Bowel sounds are normal. He exhibits no distension and no mass. There is tenderness (Most notably in RUQ ). A hernia (umbilical reducible hernia noted) is present.   Musculoskeletal: Normal range of motion. He exhibits no edema or tenderness.   Neurological: He is alert and oriented to person, place, and time.   Skin: Skin is warm and dry. No rash noted. No erythema.   Psychiatric: He has a normal mood and affect. His behavior is normal. Thought content normal.       Significant Labs:   BMP:   Recent Labs   Lab 11/29/19 0614   GLU 86      K 3.9      CO2 22*   BUN 11   CREATININE 1.7*   CALCIUM 8.7     CBC:   Recent Labs   Lab 11/28/19 0727 11/29/19 0613   WBC 4.77 6.50   HGB 8.1* 9.6*   HCT 26.6* 31.3*   * 175     CMP:   Recent Labs   Lab 11/28/19 0727 11/29/19  0614    137   K 3.9 3.9    106   CO2 22* 22*   GLU 72 86   BUN 12 11   CREATININE 1.7* 1.7*   CALCIUM 8.3* 8.7   PROT 7.3 8.3   ALBUMIN 2.2* 2.5*   BILITOT 0.7 0.5   ALKPHOS 220* 271*   AST 25 27   ALT 13 18   ANIONGAP 10 9   EGFRNONAA 52.9* 52.9*       Significant Imaging: I have reviewed all pertinent imaging results/findings within the past 24 hours.

## 2019-11-30 NOTE — PROGRESS NOTES
[facility]  Infectious Disease  Progress Note    Patient Name: Jhonny Diana  MRN: [id number]  Admission Date: 11/22/2019  Length of Stay: 6 days  Attending Physician: Michelle Rowe MD  Primary Care Provider: Primary Doctor No    Isolation Status: No active isolations  Assessment/Plan:      Enteritis  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, CMV D+/R+, steroid induction, on maintenance sirolimus; c/b seizures, LAURA, superificial wound infection s/p wound vac closure, culture negative peritonitis due to suspected indolent biliary leakage s/p repeated sphincterotomy/biliary stent placement, subsequent loculated RLQ collection s/p perc-drainage and prolonged empiric antibiotics course, gastritis/duodenal ulcer 3/6/2018, and CMV infection 3/2018) who was admitted on 11/22/2019 as a transfer from an OSH in [facility] (close to 2wks into admission) with low grade temps (peak 100F), N/V, and RUQ abdominal pain due to enteritis of unknown etiology (seen on MRI abdomen 11/21 in distal duodenum at OSH; notable no significant ascites, fluid collection, or pancreatitis on that study; patient denies associated diarrhea or bloody stools). He is stable on empiric GCV with differential most notable for CMV (moreso than community acquired viral infection that would have run its course likely by now) vs ischemic insult vs another OI (PTLD among others). He is stable while awaiting additional testing.    - Given stool ova and parasites positive for Giardia started on flagyl. Will d/c ganciclovir for now while awaiting biopsy and observe response on flagyl alone.  - will f/u endoscopy biopsies (CMV quant notably negative but ~20% of cases of GI tissue invasive disease are not accompanied by viremia) -- of note, biopsies were unable to be taking of enhancing distal duodenum so may not be representative if unremarkable  - repeat CT A/P shows  significant constipation -- continued bowel regimen  - F/u stool culture and EBV quant  - mesenteric doppler negative but did not capture all vessels of interest -- if symptoms/fevers do not resolve, can consider repeating after constipation is adequately treated        Thank you for your consult. I will follow-up with patient. Please contact us if you have any additional questions.    Cornel Siddiqui MD  Infectious Disease  [facility]    Subjective:     Principal Problem:Enteritis of infectious origin    HPI: No notes on file  Interval History: Afebrile. Continues to have abd pain mostly RUQ and RLQ. Stool ova and parasites w/ giardia cysts and trophozoites. Started on flagyl.    Review of Systems   Constitutional: Positive for activity change. Negative for appetite change, chills and fever.   Respiratory: Negative for apnea, cough, chest tightness, shortness of breath and wheezing.    Cardiovascular: Negative for chest pain, palpitations and leg swelling.   Gastrointestinal: Positive for abdominal pain and diarrhea. Negative for abdominal distention.   Genitourinary: Negative for difficulty urinating and dysuria.   Musculoskeletal: Negative for arthralgias, back pain and myalgias.   Skin: Negative for color change, pallor and rash.   Neurological: Negative for dizziness, tremors, weakness, light-headedness and headaches.   Psychiatric/Behavioral: Negative for agitation, behavioral problems and confusion.     Objective:     Vital Signs (Most Recent):  Temp: 98.4 °F (36.9 °C) (11/29/19 1620)  Pulse: 86 (11/29/19 1620)  Resp: 18 (11/29/19 1620)  BP: (!) 140/85 (11/29/19 1620)  SpO2: 97 % (11/29/19 1620) Vital Signs (24h Range):  Temp:  [97.5 °F (36.4 °C)-99.8 °F (37.7 °C)] 98.4 °F (36.9 °C)  Pulse:  [79-92] 86  Resp:  [18-19] 18  SpO2:  [96 %-100 %] 97 %  BP: (131-155)/(76-95) 140/85     Weight: 61.7 kg (136 lb 2.1 oz)  Body mass index is 21.97 kg/m².    Estimated Creatinine Clearance: 55.5  mL/min (A) (based on SCr of 1.7 mg/dL (H)).    Physical Exam   Constitutional: He is oriented to person, place, and time. No distress.   HENT:   Head: Normocephalic and atraumatic.   Mouth/Throat: No oropharyngeal exudate.   Eyes: Conjunctivae are normal. Right eye exhibits no discharge. Left eye exhibits no discharge. No scleral icterus.   Cardiovascular: Normal rate, regular rhythm, normal heart sounds and intact distal pulses.   Pulmonary/Chest: Effort normal and breath sounds normal. No stridor. No respiratory distress. He has no wheezes. He has no rales.   Abdominal: Soft. Bowel sounds are normal. He exhibits no distension. There is tenderness. There is no guarding.   Mostly RUQ & RLQ   Musculoskeletal: He exhibits no edema or tenderness.   Lymphadenopathy:     He has no cervical adenopathy.   Neurological: He is alert and oriented to person, place, and time.   Skin: Skin is warm. No rash noted. He is not diaphoretic.   Psychiatric: He has a normal mood and affect. His behavior is normal.   Nursing note and vitals reviewed.      Significant Labs:   Blood Culture:   Recent Labs   Lab 11/22/19  2140 11/22/19  2155 11/23/19  1953 11/27/19  0944 11/27/19  0954   LABBLOO No growth after 5 days. No growth after 5 days. No growth after 5 days.  No growth after 5 days. No Growth to date  No Growth to date  No Growth to date No Growth to date  No Growth to date  No Growth to date     CBC:   Recent Labs   Lab 11/28/19  0727 11/29/19  0613   WBC 4.77 6.50   HGB 8.1* 9.6*   HCT 26.6* 31.3*   * 175     CMP:   Recent Labs   Lab 11/28/19  0727 11/29/19  0614    137   K 3.9 3.9    106   CO2 22* 22*   GLU 72 86   BUN 12 11   CREATININE 1.7* 1.7*   CALCIUM 8.3* 8.7   PROT 7.3 8.3   ALBUMIN 2.2* 2.5*   BILITOT 0.7 0.5   ALKPHOS 220* 271*   AST 25 27   ALT 13 18   ANIONGAP 10 9   EGFRNONAA 52.9* 52.9*     Microbiology Results (last 7 days)     Procedure Component Value Units Date/Time    Blood culture  [[id number]] Collected:  11/27/19 0954    Order Status:  Completed Specimen:  Blood Updated:  11/29/19 1212     Blood Culture, Routine No Growth to date      No Growth to date      No Growth to date    Blood culture [[id number]] Collected:  11/27/19 0944    Order Status:  Completed Specimen:  Blood Updated:  11/29/19 1212     Blood Culture, Routine No Growth to date      No Growth to date      No Growth to date    Stool culture [[id number]] Collected:  11/28/19 1019    Order Status:  Completed Specimen:  Stool Updated:  11/29/19 0940     Stool Culture Culture in progress    Blood culture [[id number]] Collected:  11/23/19 1953    Order Status:  Completed Specimen:  Blood Updated:  11/28/19 2212     Blood Culture, Routine No growth after 5 days.    Blood culture [[id number]] Collected:  11/23/19 1953    Order Status:  Completed Specimen:  Blood Updated:  11/28/19 2212     Blood Culture, Routine No growth after 5 days.    E. coli 0157 antigen [[id number]] Collected:  11/28/19 1019    Order Status:  No result Specimen:  Stool Updated:  11/28/19 1108    Blood culture #1 [[id number]] Collected:  11/22/19 2140    Order Status:  Completed Specimen:  Blood from Peripheral, Hand, Right Updated:  11/27/19 2312     Blood Culture, Routine No growth after 5 days.    Narrative:       Blood Culture #1    Blood culture #2 [[id number]] Collected:  11/22/19 2155    Order Status:  Completed Specimen:  Blood from Peripheral, Antecubital, Left Updated:  11/27/19 2312     Blood Culture, Routine No growth after 5 days.    Narrative:       Blood Culture #2          Significant Imaging: I have reviewed all pertinent imaging results/findings within the past 24 hours.

## 2019-11-30 NOTE — DISCHARGE SUMMARY
"[facility] Hospital Medicine  Discharge Summary      Patient Name: Jhonny Diana  MRN: [id number]  Admission Date: 11/22/2019  Hospital Length of Stay: 7 days  Discharge Date and Time:  11/30/2019 3:07 PM  Attending Physician: Adriana att. providers found   Discharging Provider: Last Moffett MD  Primary Care Provider: Primary Doctor [facility] Medicine Team: [facility] HOSP MED 1 Last Moffett MD    HPI:   30 yo male patient with a history of HTN and Liver transplant performed 10/19/17 secondary to acute alcoholic hepatitis with multiple post-op complications including biliary stenosis s/p multiple ERCPs, adhesions, abdominal infections and prolonged LAURA requiring dialysis, who presents today as a transfer from [place], [place] due to R sided abdominal pain and fever.  His symptoms began around 11/11 with abdominal pain located on the right side, described as a colicky-type of pain of variable intensity, ranging from 3/10 to 9/10 accompanied by fever, poor appetite, nausea and 1 episode of vomiting. For this reason he presented to [facility] on 11/18/19 where per transfer note  he was hypertensive and tachycardic. He was started on empiric zosyn.  Imaging CT non-contrast and MRI abdomen demonstrated signs of enteritis(mucosal thickening) small right pleural effusion, splenomegaly.  An U/S abdomen with comment of "possible cirrhosis". He also developed a drop in h/h needing 2 PRBC transfusions along with mild stable Cr elevation (1.8-2.1). The patient was supposed to be transferred but due to insurance problems he left AMA and was driven here by his mother.  In the ER his Tmax was 99.4 HR 88 /83 Sats 98% RA. Currently he complains of pain 6/10 located to the right side that travels to his right flank. Denies any changes in his bowel habits, no constipation or diarrhea. Denies having a paracentesis at OSH.     Procedure(s) (LRB):  EGD (ESOPHAGOGASTRODUODENOSCOPY) (N/A)      Hospital Course: "   Patient was admitted for abdominal pain. MRI Abd w/o contrast at OSH showed changes consistent with enteritis. ID and hepatology consulted. Patient was started on zosyn on admission. CMV pending. Started on ganciclovir with plan to undergo EGD performed and bcx was done. CMV was negative. Awaiting CMV biopsies. Patient complained of worsening pain and had a fever spike, so zosyn was restarted, CT Abd showed significant constipation in colon. Patient was started on laxatives to facilitate bowel movements. Gen surg was consulted for concerns of adhesions causing bowel obstruction or worsening abd pain given hx of abd surgeries and adhesions. They expressed no concern for bowel obstrutcion per their imaging read and review of the case. US Mesenteric Ischemia was limited due to excess abdominal gas. Patient continued to receive laxatives. Giardia was positive on stool assay, ganciclovir was d/c and metronidazole was started. EBV <100. Environmental history and sexual history was taken from the patient and patient has a male sexual partner, who is also present at the bedside. Explain Giardia can be transmitted fecal oral route. Patient discharged home with 6 additional days course of Flagyl and partner sent out with 1 day treatment of Tinidazole. Hepatology wanted him to be on .5mg sirolimus up on discharge. Patient set up for ID follow up for Giardia and Hepatology appointment for immunosuppressant therapy dosing.       Review of Systems   Constitutional: Negative for activity change, appetite change, chills and fever.   Respiratory: Negative for apnea, cough, chest tightness, shortness of breath and wheezing.    Cardiovascular: Negative for chest pain, palpitations and leg swelling.   Gastrointestinal: Positive for abdominal pain and diarrhea (getting better). Negative for abdominal distention.   Genitourinary: Negative for difficulty urinating and dysuria.   Musculoskeletal: Negative for arthralgias, back pain and  myalgias.   Skin: Negative for color change, pallor and rash.   Neurological: Negative for dizziness, tremors, weakness, light-headedness and headaches.   Psychiatric/Behavioral: Negative for agitation, behavioral problems and confusion.     Vitals:    11/30/19 0042 11/30/19 0439 11/30/19 0800 11/30/19 1128   BP: 138/82 (!) 149/94 125/71 137/83   BP Location: Left arm Left arm Left arm Left arm   Patient Position: Lying Lying Lying Sitting   Pulse: 105 93 100 92   Resp: 18 16 17 18   Temp: 98 °F (36.7 °C) 98.3 °F (36.8 °C) 98.7 °F (37.1 °C) 98.3 °F (36.8 °C)   TempSrc: Oral Oral Oral Oral   SpO2: 100% 99% 96% 97%   Weight:       Height:         Physical Exam   Constitutional: He is oriented to person, place, and time. He appears well-developed and well-nourished.   Appears comfortable  HENT:   Head: Normocephalic and atraumatic.   Mouth/Throat: Oropharynx is clear and moist.   Eyes: Conjunctivae and EOM are normal.   Neck: Normal range of motion.   Cardiovascular: Normal rate, regular rhythm, normal heart sounds and intact distal pulses.   No murmur heard.  Pulmonary/Chest: Effort normal and breath sounds normal. He has no wheezes. He has no rales.   Abdominal: Soft. Bowel sounds are normal. He exhibits no distension and no mass. There is tenderness (Most notably in RUQ ). A hernia (umbilical reducible hernia noted) is present.   Musculoskeletal: Normal range of motion. He exhibits no edema or tenderness.   Neurological: He is alert and oriented to person, place, and time.   Skin: Skin is warm and dry. No rash noted. No erythema.   Psychiatric: He has a normal mood and affect. His behavior is normal. Thought content normal.     Consults:   Consults (From admission, onward)        Status Ordering Provider     Inpatient consult to General Surgery  Once     Provider:  (Not yet assigned)    Completed GIOVANA MCKOY     Inpatient consult to Hepatology  Once     Provider:  (Not yet assigned)    Completed OLENA MATA      Inpatient consult to Infectious Diseases  Once     Provider:  (Not yet assigned)    Completed OLENA MATA     Inpatient consult to Midline team  Once     Provider:  (Not yet assigned)    Completed RAFAEL FLETCHER     Inpatient consult to Midline team  Once     Provider:  (Not yet assigned)    Completed TYRESE SCHAFFER     Inpatient consult to Midline team  Once     Provider:  (Not yet assigned)    Completed TYRESE SCHAFFER     IP consult to case management  Once     Provider:  (Not yet assigned)    Acknowledged TYRESE SCHAFFER          No new Assessment & Plan notes have been filed under this hospital service since the last note was generated.  Service: Hospital Medicine    Final Active Diagnoses:    Diagnosis Date Noted POA    PRINCIPAL PROBLEM:  Enteritis of infectious origin [A09] 11/23/2019 Yes    LAURA (acute kidney injury) [N17.9] 11/24/2019 Yes    Weakness [R53.1] 11/23/2019 Yes    Right upper quadrant abdominal pain [R10.11] 11/23/2019 Yes    Anemia of chronic disease [D63.8] 11/23/2019 Yes    History of opioid abuse [F11.11] 11/23/2019 Yes    Enteritis [K52.9] 11/22/2019 Yes    Umbilical hernia [K42.9] 04/12/2019 Yes    Acute kidney injury superimposed on CKD [N17.9, N18.9] 08/14/2018 Yes    Thrombocytopenia [D69.6] 05/12/2018 Yes    Anemia of chronic renal failure [N18.9, D63.1]  Yes    Alcohol use disorder, severe, in early remission [F10.21] 04/23/2018 Yes    Constipation [K59.00] 01/20/2018 Yes    S/P liver transplant [Z94.4] 10/20/2017 Not Applicable     Chronic    Long-term use of immunosuppressant medication [Z79.899] 10/20/2017 Not Applicable     Chronic      Problems Resolved During this Admission:       Discharged Condition: good    Disposition: Home or Self Care    Follow Up:    Patient Instructions:      Ambulatory Referral to Infectious Disease   Referral Priority: Routine Referral Type: Consultation   Referral Reason: Specialty Services Required   Requested  Specialty: Infectious Diseases   Number of Visits Requested: 1     Ambulatory Referral to Hepatology   Referral Priority: Routine Referral Type: Consultation   Referral Reason: Specialty Services Required   Requested Specialty: Hepatology   Number of Visits Requested: 1       Significant Diagnostic Studies: Labs:   CMP   Recent Labs   Lab 11/29/19  0614 11/30/19  0541    138   K 3.9 3.9    108   CO2 22* 22*   GLU 86 91   BUN 11 12   CREATININE 1.7* 1.6*   CALCIUM 8.7 7.9*   PROT 8.3 6.7   ALBUMIN 2.5* 2.0*   BILITOT 0.5 0.3   ALKPHOS 271* 213*   AST 27 23   ALT 18 14   ANIONGAP 9 8   ESTGFRAFRICA >60.0 >60.0   EGFRNONAA 52.9* 57.0*   , CBC   Recent Labs   Lab 11/29/19  0613 11/30/19  0541   WBC 6.50 4.21   HGB 9.6* 7.6*   HCT 31.3* 24.1*    138*    and All labs within the past 24 hours have been reviewed    Pending Diagnostic Studies:     Procedure Component Value Units Date/Time    Phosphatidylethanol (PETH) [[id number]] Collected:  11/24/19 0011    Order Status:  Sent Lab Status:  In process Updated:  11/24/19 0046    Specimen:  Blood     Specimen to Pathology, Surgery Gastrointestinal tract [[id number]] Collected:  11/25/19 1136    Order Status:  Sent Lab Status:  In process Updated:  11/26/19 1051    Vitamin B1 [[id number]] Collected:  11/30/19 1438    Order Status:  Sent Lab Status:  In process Updated:  11/30/19 1445    Specimen:  Blood     Vitamin B6 [[id number]] Collected:  11/30/19 1438    Order Status:  Sent Lab Status:  In process Updated:  11/30/19 1445    Specimen:  Blood     Vitamin D [[id number]] Collected:  11/30/19 1438    Order Status:  Sent Lab Status:  In process Updated:  11/30/19 1445    Specimen:  Blood          Medications:  Reconciled Home Medications:      Medication List      START taking these medications    HYDROmorphone 2 MG tablet  Commonly known as:  DILAUDID  Take 1 tablet (2 mg total) by mouth every 6 (six) hours as needed for Pain.     metroNIDAZOLE 500 MG  tablet  Commonly known as:  FLAGYL  Take 1 tablet (500 mg total) by mouth every 8 (eight) hours. for 6 days     promethazine 25 MG tablet  Commonly known as:  PHENERGAN  Take 1 tablet (25 mg total) by mouth every 6 (six) hours as needed for Nausea.        CHANGE how you take these medications    sirolimus 0.5 mg Tab  Take 1 tablet (0.5 mg total) by mouth once daily.  What changed:    · medication strength  · how much to take        CONTINUE taking these medications    bisacodyl 5 mg EC tablet  Commonly known as:  DULCOLAX  Take 5 mg by mouth daily as needed for Constipation.     TYLENOL ORAL  Take 1 tablet by mouth daily as needed (for fever).        STOP taking these medications    famotidine 10 MG tablet  Commonly known as:  PEPCID            Indwelling Lines/Drains at time of discharge:   Lines/Drains/Airways     None                 Time spent on the discharge of patient: 35 minutes  Patient was seen and examined on the date of discharge and determined to be suitable for discharge.         LUISANA Moffett MD  Department of Hospital Medicine  [facility]

## 2019-11-30 NOTE — TREATMENT PLAN
Hepatology Treatment Plan    Jhonny Diana is a 29 y.o. male admitted to hospital 11/22/2019 (Hospital Day: 9) due to Enteritis of infectious origin. Hepatology following for immunosuppression management.    Lab Results   Component Value Date    TACROLIMUS <1.5 (L) 03/04/2018    TACROLIMUS 1.6 (L) 03/03/2018    TACROLIMUS 3.2 (L) 03/02/2018    TACROLIMUS 3.2 (L) 03/02/2018    TACROLIMUS 3.2 (L) 03/02/2018       Lab Results   Component Value Date    CREATININE 1.6 (H) 11/30/2019    CREATININE 1.7 (H) 11/29/2019    CREATININE 1.7 (H) 11/28/2019       Lab Results   Component Value Date    ALT 14 11/30/2019    AST 23 11/30/2019     (H) 10/26/2017    ALKPHOS 213 (H) 11/30/2019    BILITOT 0.3 11/30/2019    WBC 4.21 11/30/2019    HGB 7.6 (L) 11/30/2019     (L) 11/30/2019       Kidney function is stable  Liver enzymes are stable    Plan  - Continue immunosuppression regimen without changes    Immunosuppression Regimen:  Sirolimus: 0.5mg daily       Please notify hepatology on day of discharge to discuss discharge medications and follow up.     Please obtain daily CBC, BMP, LFT, INR, immunosuppressant trough level    Thank you for involving us in the care of Jhonny Diana. Please call with any additional concerns or questions.    Aneudy Barrios MD  Gastroenterology Fellow

## 2019-11-30 NOTE — NURSING
Patient is AAOx4, VSS, pain controlled with current regimen. Right upper arm midline removed, catheter tip intact, pressure dressing applied. Deactivated and collected ipad. No visi equipment. Reviewed AVS, all questions answered. No follow up appointments scheduled at this time. Prescriptions sent to local pharmacy for . Patient refused transport service. He ambulated out of room independently with family at his side and personal belongings in hand.

## 2019-11-30 NOTE — ASSESSMENT & PLAN NOTE
Per chart review, Hb had been stable in the 10.5-12 range for the past months. H/H dropped at OSH with no obvious source of bleeding and needed 2 tranfusions.  - Although reliability will no be optimal since he was already transfused.   Claudia, LDH, T bili wnl. Haptoglobin elevated likely due to inflammatory process  Iron studies consistent with anemia of chronic disease and bone marrow suppression from active inflammatory process  Transfuse if Hgb <7.    H/H 7.2 in light of symptoms (lightheadness, fatigue). S/p 1 unit of pRBCs, repeat H/H 8.1/25.5. 9.6 today  -Stable

## 2019-11-30 NOTE — ASSESSMENT & PLAN NOTE
30 yo male with Liver transplant in 2017 secondary to alcoholic hepatitis and multiple post-op complications including biliary duct stricture s/p multiple ERCPs w/stent placement, infections and prolonged LAURA requiring dialysis, coming for severe right sided abdominal pain with fever. Per notes, imaging from OSH CT non-contrast and MRI abdomen demonstrate signs of enteritis(mucosal thickening) small right pleural effusion and splenomegaly.    -Discussed with General surgery. Per their review pateint doesn't appear obstructed and no physical exam findings or imaging findings concerning for surgical intervention needs. Recommended to continue with laxatives. Will touch base if abdominal pain doesn't improve despite efforts given his prior hx of adhesions.

## 2019-12-01 ENCOUNTER — PATIENT MESSAGE (OUTPATIENT)
Dept: TRANSPLANT | Facility: CLINIC | Age: 29
End: 2019-12-01

## 2019-12-02 ENCOUNTER — TELEPHONE (OUTPATIENT)
Dept: TRANSPLANT | Facility: CLINIC | Age: 29
End: 2019-12-02

## 2019-12-02 LAB
BACTERIA BLD CULT: NORMAL
BACTERIA BLD CULT: NORMAL
BACTERIA STL CULT: NORMAL
GPP - ADENOVIRUS 40/41: NOT DETECTED
GPP - CAMPYLOBACTER: NOT DETECTED
GPP - CLOSTRIDIUM DIFFICILE TOXIN A/B: NOT DETECTED
GPP - CRYPTOSPORIDIUM: NOT DETECTED
GPP - E COLI O157: NOT DETECTED
GPP - ENTAMOEBA HISTOLYTICA: NOT DETECTED
GPP - ENTEROTOXIGENIC E COLI (ETEC): NOT DETECTED
GPP - GIARDIA LAMBLIA: POSITIVE
GPP - NOROVIRUS GI/GII: NOT DETECTED
GPP - ROTAVIRUS A: NOT DETECTED
GPP - SALMONELLA: NOT DETECTED
GPP - SHIGELLA: NOT DETECTED
GPP - VIBRIO CHOLERA: NOT DETECTED
GPP - YERSINIA ENTEROCOLITICA: NOT DETECTED
LACTATE PLASV-SCNC: NOT DETECTED MMOL/L

## 2019-12-02 NOTE — TELEPHONE ENCOUNTER
Robert Barry,    I hope you are feeling better. I have forwarded an urgent msg to your discharging doctor Dr Moffett. I am going to f/u and make sure f/u appts are scheduled. Are there certain days that are better for you?  Thanks,  Lubna  ===View-only below this line===      ----- Message -----     From: Jhonny Diana     Sent: 12/1/2019 12:18 PM CST       To: Georgina Rogers MD  Subject: Prescription    Hi,    When I was discharged yesterday they sent a prescription for the wrong drug to [facility].  It was for Ashkan Thapa born 01/26/1898 though, not myself, because of the possibility of reinfection.  They sent tyzanidine instead of tinidazole, so we need the correct prescription sent to the 24-hour [facility] pharmacy in [place], [place] at the corner of [facility].  If anyone needs to speak with Ashkan they can reach him at [phone].    Thanks!

## 2019-12-02 NOTE — PLAN OF CARE
Patient discharged home with family.       12/02/19 0852   Final Note   Assessment Type Final Discharge Note   Anticipated Discharge Disposition Home

## 2019-12-04 LAB
FINAL PATHOLOGIC DIAGNOSIS: NORMAL
GROSS: NORMAL
Lab: NORMAL
PHOSPHATIDYLETHANOL (PETH): NEGATIVE NG/ML

## 2019-12-06 ENCOUNTER — HOSPITAL ENCOUNTER (INPATIENT)
Facility: HOSPITAL | Age: 29
LOS: 5 days | Discharge: HOME | DRG: 392 | End: 2019-12-11
Attending: HOSPITALIST | Admitting: HOSPITALIST
Payer: COMMERCIAL

## 2019-12-06 DIAGNOSIS — B02.9 HERPES ZOSTER WITHOUT COMPLICATION: ICD-10-CM

## 2019-12-06 DIAGNOSIS — K52.9 GASTROENTERITIS: ICD-10-CM

## 2019-12-06 DIAGNOSIS — A09: ICD-10-CM

## 2019-12-06 DIAGNOSIS — E87.20 METABOLIC ACIDOSIS: Primary | ICD-10-CM

## 2019-12-06 DIAGNOSIS — Z79.60 LONG-TERM USE OF IMMUNOSUPPRESSANT MEDICATION: Chronic | ICD-10-CM

## 2019-12-06 DIAGNOSIS — D63.8 ANEMIA OF CHRONIC DISEASE: ICD-10-CM

## 2019-12-06 DIAGNOSIS — Z94.4 S/P LIVER TRANSPLANT: Chronic | ICD-10-CM

## 2019-12-06 DIAGNOSIS — N17.9 AKI (ACUTE KIDNEY INJURY): ICD-10-CM

## 2019-12-06 LAB
ALBUMIN SERPL BCP-MCNC: 2.7 G/DL (ref 3.5–5.2)
ALP SERPL-CCNC: 142 U/L (ref 55–135)
ALT SERPL W/O P-5'-P-CCNC: 8 U/L (ref 10–44)
ANION GAP SERPL CALC-SCNC: 14 MMOL/L (ref 8–16)
AST SERPL-CCNC: 22 U/L (ref 10–40)
BASOPHILS # BLD AUTO: 0.05 K/UL (ref 0–0.2)
BASOPHILS NFR BLD: 0.7 % (ref 0–1.9)
BILIRUB SERPL-MCNC: 0.4 MG/DL (ref 0.1–1)
BUN SERPL-MCNC: 15 MG/DL (ref 6–20)
CALCIUM SERPL-MCNC: 9.2 MG/DL (ref 8.7–10.5)
CHLORIDE SERPL-SCNC: 104 MMOL/L (ref 95–110)
CO2 SERPL-SCNC: 20 MMOL/L (ref 23–29)
CREAT SERPL-MCNC: 2 MG/DL (ref 0.5–1.4)
DIFFERENTIAL METHOD: ABNORMAL
EOSINOPHIL # BLD AUTO: 0.4 K/UL (ref 0–0.5)
EOSINOPHIL NFR BLD: 5 % (ref 0–8)
ERYTHROCYTE [DISTWIDTH] IN BLOOD BY AUTOMATED COUNT: 16.1 % (ref 11.5–14.5)
EST. GFR  (AFRICAN AMERICAN): 50.3 ML/MIN/1.73 M^2
EST. GFR  (NON AFRICAN AMERICAN): 43.5 ML/MIN/1.73 M^2
GLUCOSE SERPL-MCNC: 65 MG/DL (ref 70–110)
HCT VFR BLD AUTO: 27.5 % (ref 40–54)
HGB BLD-MCNC: 8.6 G/DL (ref 14–18)
IMM GRANULOCYTES # BLD AUTO: 0.04 K/UL (ref 0–0.04)
IMM GRANULOCYTES NFR BLD AUTO: 0.6 % (ref 0–0.5)
INR PPP: 1 (ref 0.8–1.2)
LACTATE SERPL-SCNC: 0.7 MMOL/L (ref 0.5–2.2)
LYMPHOCYTES # BLD AUTO: 1.8 K/UL (ref 1–4.8)
LYMPHOCYTES NFR BLD: 25.1 % (ref 18–48)
MCH RBC QN AUTO: 27.2 PG (ref 27–31)
MCHC RBC AUTO-ENTMCNC: 31.3 G/DL (ref 32–36)
MCV RBC AUTO: 87 FL (ref 82–98)
MONOCYTES # BLD AUTO: 0.6 K/UL (ref 0.3–1)
MONOCYTES NFR BLD: 9.1 % (ref 4–15)
NEUTROPHILS # BLD AUTO: 4.2 K/UL (ref 1.8–7.7)
NEUTROPHILS NFR BLD: 59.5 % (ref 38–73)
NRBC BLD-RTO: 0 /100 WBC
PLATELET # BLD AUTO: 282 K/UL (ref 150–350)
PMV BLD AUTO: 9.3 FL (ref 9.2–12.9)
POTASSIUM SERPL-SCNC: 4.4 MMOL/L (ref 3.5–5.1)
PROT SERPL-MCNC: 8 G/DL (ref 6–8.4)
PROTHROMBIN TIME: 10.2 SEC (ref 9–12.5)
RBC # BLD AUTO: 3.16 M/UL (ref 4.6–6.2)
SODIUM SERPL-SCNC: 138 MMOL/L (ref 136–145)
WBC # BLD AUTO: 7 K/UL (ref 3.9–12.7)

## 2019-12-06 PROCEDURE — 99223 1ST HOSP IP/OBS HIGH 75: CPT | Mod: ,,, | Performed by: HOSPITALIST

## 2019-12-06 PROCEDURE — 20600001 HC STEP DOWN PRIVATE ROOM

## 2019-12-06 PROCEDURE — 87040 BLOOD CULTURE FOR BACTERIA: CPT | Mod: 59

## 2019-12-06 PROCEDURE — 83605 ASSAY OF LACTIC ACID: CPT

## 2019-12-06 PROCEDURE — 85610 PROTHROMBIN TIME: CPT

## 2019-12-06 PROCEDURE — 36415 COLL VENOUS BLD VENIPUNCTURE: CPT

## 2019-12-06 PROCEDURE — 63600175 PHARM REV CODE 636 W HCPCS: Performed by: HOSPITALIST

## 2019-12-06 PROCEDURE — S0030 INJECTION, METRONIDAZOLE: HCPCS | Performed by: HOSPITALIST

## 2019-12-06 PROCEDURE — 99223 PR INITIAL HOSPITAL CARE,LEVL III: ICD-10-PCS | Mod: ,,, | Performed by: HOSPITALIST

## 2019-12-06 PROCEDURE — 25000003 PHARM REV CODE 250: Performed by: HOSPITALIST

## 2019-12-06 PROCEDURE — 85025 COMPLETE CBC W/AUTO DIFF WBC: CPT

## 2019-12-06 PROCEDURE — 80053 COMPREHEN METABOLIC PANEL: CPT

## 2019-12-06 RX ORDER — METRONIDAZOLE 500 MG/100ML
500 INJECTION, SOLUTION INTRAVENOUS
Status: DISCONTINUED | OUTPATIENT
Start: 2019-12-06 | End: 2019-12-09

## 2019-12-06 RX ORDER — GLUCAGON 1 MG
1 KIT INJECTION
Status: DISCONTINUED | OUTPATIENT
Start: 2019-12-06 | End: 2019-12-11 | Stop reason: HOSPADM

## 2019-12-06 RX ORDER — HYDROMORPHONE HYDROCHLORIDE 1 MG/ML
1 INJECTION, SOLUTION INTRAMUSCULAR; INTRAVENOUS; SUBCUTANEOUS EVERY 4 HOURS PRN
Status: DISCONTINUED | OUTPATIENT
Start: 2019-12-06 | End: 2019-12-11 | Stop reason: HOSPADM

## 2019-12-06 RX ORDER — SIROLIMUS 0.5 MG/1
0.5 TABLET, FILM COATED ORAL DAILY
Status: DISCONTINUED | OUTPATIENT
Start: 2019-12-07 | End: 2019-12-08

## 2019-12-06 RX ORDER — IBUPROFEN 200 MG
16 TABLET ORAL
Status: DISCONTINUED | OUTPATIENT
Start: 2019-12-06 | End: 2019-12-11 | Stop reason: HOSPADM

## 2019-12-06 RX ORDER — ONDANSETRON 2 MG/ML
8 INJECTION INTRAMUSCULAR; INTRAVENOUS EVERY 8 HOURS PRN
Status: DISCONTINUED | OUTPATIENT
Start: 2019-12-06 | End: 2019-12-07

## 2019-12-06 RX ORDER — OXYCODONE HYDROCHLORIDE 10 MG/1
10 TABLET ORAL EVERY 4 HOURS PRN
Status: DISCONTINUED | OUTPATIENT
Start: 2019-12-06 | End: 2019-12-06

## 2019-12-06 RX ORDER — IBUPROFEN 200 MG
24 TABLET ORAL
Status: DISCONTINUED | OUTPATIENT
Start: 2019-12-06 | End: 2019-12-11 | Stop reason: HOSPADM

## 2019-12-06 RX ORDER — PANTOPRAZOLE SODIUM 40 MG/1
40 TABLET, DELAYED RELEASE ORAL DAILY
Status: DISCONTINUED | OUTPATIENT
Start: 2019-12-06 | End: 2019-12-11 | Stop reason: HOSPADM

## 2019-12-06 RX ORDER — ACETAMINOPHEN 325 MG/1
650 TABLET ORAL EVERY 4 HOURS PRN
Status: DISCONTINUED | OUTPATIENT
Start: 2019-12-06 | End: 2019-12-11 | Stop reason: HOSPADM

## 2019-12-06 RX ORDER — HYDROMORPHONE HYDROCHLORIDE 1 MG/ML
1 INJECTION, SOLUTION INTRAMUSCULAR; INTRAVENOUS; SUBCUTANEOUS ONCE
Status: COMPLETED | OUTPATIENT
Start: 2019-12-06 | End: 2019-12-06

## 2019-12-06 RX ORDER — SODIUM CHLORIDE 9 MG/ML
INJECTION, SOLUTION INTRAVENOUS CONTINUOUS
Status: DISCONTINUED | OUTPATIENT
Start: 2019-12-06 | End: 2019-12-08

## 2019-12-06 RX ORDER — SODIUM CHLORIDE 0.9 % (FLUSH) 0.9 %
10 SYRINGE (ML) INJECTION
Status: DISCONTINUED | OUTPATIENT
Start: 2019-12-06 | End: 2019-12-09

## 2019-12-06 RX ORDER — CIPROFLOXACIN 2 MG/ML
400 INJECTION, SOLUTION INTRAVENOUS
Status: DISCONTINUED | OUTPATIENT
Start: 2019-12-06 | End: 2019-12-09

## 2019-12-06 RX ORDER — DICYCLOMINE HYDROCHLORIDE 10 MG/1
10 CAPSULE ORAL 4 TIMES DAILY
Status: DISCONTINUED | OUTPATIENT
Start: 2019-12-06 | End: 2019-12-10

## 2019-12-06 RX ORDER — OXYCODONE HYDROCHLORIDE 5 MG/1
15 TABLET ORAL EVERY 4 HOURS PRN
Status: DISCONTINUED | OUTPATIENT
Start: 2019-12-06 | End: 2019-12-11 | Stop reason: HOSPADM

## 2019-12-06 RX ADMIN — ONDANSETRON 8 MG: 2 INJECTION INTRAMUSCULAR; INTRAVENOUS at 05:12

## 2019-12-06 RX ADMIN — HYDROMORPHONE HYDROCHLORIDE 1 MG: 1 INJECTION, SOLUTION INTRAMUSCULAR; INTRAVENOUS; SUBCUTANEOUS at 11:12

## 2019-12-06 RX ADMIN — OXYCODONE HYDROCHLORIDE 10 MG: 10 TABLET ORAL at 07:12

## 2019-12-06 RX ADMIN — SODIUM CHLORIDE: 0.9 INJECTION, SOLUTION INTRAVENOUS at 05:12

## 2019-12-06 RX ADMIN — ACETAMINOPHEN 650 MG: 325 TABLET ORAL at 09:12

## 2019-12-06 RX ADMIN — DICYCLOMINE HYDROCHLORIDE 10 MG: 10 CAPSULE ORAL at 11:12

## 2019-12-06 RX ADMIN — PANTOPRAZOLE SODIUM 40 MG: 40 TABLET, DELAYED RELEASE ORAL at 11:12

## 2019-12-06 RX ADMIN — CIPROFLOXACIN 400 MG: 2 INJECTION, SOLUTION INTRAVENOUS at 05:12

## 2019-12-06 RX ADMIN — METRONIDAZOLE 500 MG: 500 INJECTION, SOLUTION INTRAVENOUS at 05:12

## 2019-12-06 NOTE — PLAN OF CARE
(Physician in Lead of Transfers)   Outside Transfer Acceptance Note / Regional Referral Center    Upon patient arrival to floor, please call extension [phone] (if no answer, this will flip to a beeper, so enter your call back number) for Hospital Medicine admit team assignment and for additional admit orders for the patient.  Do not page the attending physician associated with the patient on arrival (this physician may not be on duty at the time of arrival).  Rather, always call [phone] to reach the triage physician for orders and team assignment.    Transferring Physician: Bean Moore    Accepting Physician: Arlette Black MD    Date of Acceptance: 12/06/2019    Transferring Facility: [facility]     Reason for Transfer: enteritis in liver tranplant patient     Report from Transferring Physician/Hospital course:  Patient is a 29 y.o. male who has a past medical history of HTN and Liver transplant performed 10/19/17 secondary to acute alcoholic hepatitis with multiple post-op complications including biliary stenosis s/p multiple ERCPs, adhesions, abdominal infections and prolonged LAURA requiring dialysis presented with RUQ pain, nausea, vomiting and diarrhea. [place] [facility] [place] with abdominal pain and MRI of abdomen showed changes consistent with enteritis. CT Abd showed significant constipation in colon. He had extensive work up to exclude obstruction (general surgery stated no surgical intervention required), US Mesenteric arteria to exclude Ischemia, and EGD for biopsy to exclude CMV. Stool ova and parasites was positive for Giardia. Patient was started on zosyn on admission and ID recommended 7 day course of oral Metronidazole 500 mg po tid. He was then set up for ID follow up for Giardia and Hepatology appointment for immunosuppressant therapy dosing. After discharge patient had difficulty getting his prescriptions filled as they were sent to the  wrong pharmacy. He was able to fill his prescriptions and started taking the antibiotics. He now presents with similar symptoms and because of the nausea and vomiting he was unable to keep his antibiotics down. Repeat CT scan in ED showed normal bowel but increased ascites. He is otherwise hemodynamically stable. Hepatology aware of patient and have accepted the patient for transfer.        Vitals:   Temp: 98.4 BP:140/80 pulse: 96 RR 20 O2 sat: 98%      Labs & Radiographs: see EPIC    Ast 24  Alt 11  Alk phos 162  Bili 0.9  Lipase 49  Bicarb 16  Ph 7.5  Wbc 11.2  Hem 10.3  Plate 373  Creat 2.2    To Do List:   1. Admit to   2. Start IV Flagyl  3. Consult hepatology   4. Consult ID  5. Supportive care (IVF's, pain control, antiemetics).       Upon patient arrival to floor, please call extension [phone] (if no answer, this will flip to a beeper, so enter your call back number) for Hospital Medicine admit team assignment and for additional admit orders for the patient.  Do not page the attending physician associated with the patient on arrival (this physician may not be on duty at the time of arrival).  Rather, always call [phone] to reach the triage physician for orders and team assignment.      Arlette Black MD  Hospital Medicine Staff

## 2019-12-06 NOTE — PROGRESS NOTES
Pt arrived to the floor as a direct admit , pt admitted to room 7067 , bed at low position and nursing call within reach . Pt is A,A,O x 4 . Stable V/S . Pt C/O siddharth in his abd and N/V . Family at bedside . [phone] # called and informed Dr. Carrasco pt arrived to the floor . Will continue monitoring .

## 2019-12-06 NOTE — PROGRESS NOTES
Home Sleep Study Documentation    Pre-Sleep Home Study:    Set-up and instructions performed by: ADILIA SWIFT      Technician performed demonstration for Patient: yes    Return demonstration performed by Patient: yes    Written instructions provided to Patient: yes    Patient signed consent form: yes        Post-Sleep Home Study:    Additional comments by Patient: Difficult to fall back asleep with wires and nasal cannula    Home Sleep Study Failed:no:    Failure reason: N/A    Reported or Detected: N/A    Scored by: Nura Angel CRT, RPSGT Patient left via wc with all belongings accompanied by transport to return to room

## 2019-12-07 PROBLEM — R19.7 DIARRHEA: Status: ACTIVE | Noted: 2019-12-07

## 2019-12-07 PROBLEM — E44.0 MODERATE PROTEIN MALNUTRITION: Status: ACTIVE | Noted: 2018-07-23

## 2019-12-07 LAB
ALBUMIN SERPL BCP-MCNC: 2.4 G/DL (ref 3.5–5.2)
ALP SERPL-CCNC: 125 U/L (ref 55–135)
ALT SERPL W/O P-5'-P-CCNC: 7 U/L (ref 10–44)
AMPHET+METHAMPHET UR QL: NORMAL
ANION GAP SERPL CALC-SCNC: 10 MMOL/L (ref 8–16)
AST SERPL-CCNC: 19 U/L (ref 10–40)
BARBITURATES UR QL SCN>200 NG/ML: NEGATIVE
BASOPHILS # BLD AUTO: 0.04 K/UL (ref 0–0.2)
BASOPHILS NFR BLD: 0.7 % (ref 0–1.9)
BENZODIAZ UR QL SCN>200 NG/ML: NEGATIVE
BILIRUB SERPL-MCNC: 0.2 MG/DL (ref 0.1–1)
BILIRUB UR QL STRIP: NEGATIVE
BUN SERPL-MCNC: 14 MG/DL (ref 6–20)
BZE UR QL SCN: NEGATIVE
CALCIUM SERPL-MCNC: 8.1 MG/DL (ref 8.7–10.5)
CANNABINOIDS UR QL SCN: NEGATIVE
CHLORIDE SERPL-SCNC: 107 MMOL/L (ref 95–110)
CLARITY UR REFRACT.AUTO: CLEAR
CO2 SERPL-SCNC: 21 MMOL/L (ref 23–29)
COLOR UR AUTO: YELLOW
CREAT SERPL-MCNC: 1.7 MG/DL (ref 0.5–1.4)
CREAT UR-MCNC: 67 MG/DL (ref 23–375)
CRP SERPL-MCNC: 14.8 MG/L (ref 0–8.2)
DIFFERENTIAL METHOD: ABNORMAL
EOSINOPHIL # BLD AUTO: 0.2 K/UL (ref 0–0.5)
EOSINOPHIL NFR BLD: 3.7 % (ref 0–8)
EOSINOPHIL URNS QL WRIGHT STN: NORMAL
ERYTHROCYTE [DISTWIDTH] IN BLOOD BY AUTOMATED COUNT: 15.9 % (ref 11.5–14.5)
ERYTHROCYTE [SEDIMENTATION RATE] IN BLOOD BY WESTERGREN METHOD: 56 MM/HR (ref 0–23)
EST. GFR  (AFRICAN AMERICAN): >60 ML/MIN/1.73 M^2
EST. GFR  (NON AFRICAN AMERICAN): 52.9 ML/MIN/1.73 M^2
ETHANOL UR-MCNC: <10 MG/DL
FERRITIN SERPL-MCNC: 663 NG/ML (ref 20–300)
GLUCOSE SERPL-MCNC: 115 MG/DL (ref 70–110)
GLUCOSE UR QL STRIP: NEGATIVE
HCT VFR BLD AUTO: 25 % (ref 40–54)
HGB BLD-MCNC: 7.9 G/DL (ref 14–18)
HGB UR QL STRIP: NEGATIVE
IMM GRANULOCYTES # BLD AUTO: 0.03 K/UL (ref 0–0.04)
IMM GRANULOCYTES NFR BLD AUTO: 0.5 % (ref 0–0.5)
IRON SERPL-MCNC: 62 UG/DL (ref 45–160)
KETONES UR QL STRIP: NEGATIVE
LEUKOCYTE ESTERASE UR QL STRIP: NEGATIVE
LYMPHOCYTES # BLD AUTO: 1.5 K/UL (ref 1–4.8)
LYMPHOCYTES NFR BLD: 26 % (ref 18–48)
MAGNESIUM SERPL-MCNC: 1.6 MG/DL (ref 1.6–2.6)
MCH RBC QN AUTO: 27.2 PG (ref 27–31)
MCHC RBC AUTO-ENTMCNC: 31.6 G/DL (ref 32–36)
MCV RBC AUTO: 86 FL (ref 82–98)
METHADONE UR QL SCN>300 NG/ML: NEGATIVE
MONOCYTES # BLD AUTO: 0.5 K/UL (ref 0.3–1)
MONOCYTES NFR BLD: 9.4 % (ref 4–15)
NEUTROPHILS # BLD AUTO: 3.4 K/UL (ref 1.8–7.7)
NEUTROPHILS NFR BLD: 59.7 % (ref 38–73)
NITRITE UR QL STRIP: NEGATIVE
NRBC BLD-RTO: 0 /100 WBC
OPIATES UR QL SCN: NORMAL
PCP UR QL SCN>25 NG/ML: NEGATIVE
PH UR STRIP: 6 [PH] (ref 5–8)
PHOSPHATE SERPL-MCNC: 3.8 MG/DL (ref 2.7–4.5)
PLATELET # BLD AUTO: 209 K/UL (ref 150–350)
PMV BLD AUTO: 9.5 FL (ref 9.2–12.9)
POTASSIUM SERPL-SCNC: 3.8 MMOL/L (ref 3.5–5.1)
PREALB SERPL-MCNC: 13 MG/DL (ref 20–43)
PROT SERPL-MCNC: 7.1 G/DL (ref 6–8.4)
PROT UR QL STRIP: NEGATIVE
PROT UR-MCNC: 26 MG/DL (ref 0–15)
PROT/CREAT UR: 0.39 MG/G{CREAT} (ref 0–0.2)
RBC # BLD AUTO: 2.9 M/UL (ref 4.6–6.2)
SATURATED IRON: 33 % (ref 20–50)
SIROLIMUS BLD-MCNC: <2 NG/ML (ref 4–20)
SODIUM SERPL-SCNC: 138 MMOL/L (ref 136–145)
SODIUM UR-SCNC: 107 MMOL/L (ref 20–250)
SP GR UR STRIP: 1.01 (ref 1–1.03)
TOTAL IRON BINDING CAPACITY: 189 UG/DL (ref 250–450)
TOXICOLOGY INFORMATION: NORMAL
TRANSFERRIN SERPL-MCNC: 128 MG/DL (ref 200–375)
URN SPEC COLLECT METH UR: NORMAL
WBC # BLD AUTO: 5.66 K/UL (ref 3.9–12.7)

## 2019-12-07 PROCEDURE — 86140 C-REACTIVE PROTEIN: CPT

## 2019-12-07 PROCEDURE — 94761 N-INVAS EAR/PLS OXIMETRY MLT: CPT

## 2019-12-07 PROCEDURE — 97802 MEDICAL NUTRITION INDIV IN: CPT

## 2019-12-07 PROCEDURE — 99223 PR INITIAL HOSPITAL CARE,LEVL III: ICD-10-PCS | Mod: ,,, | Performed by: INTERNAL MEDICINE

## 2019-12-07 PROCEDURE — 99233 PR SUBSEQUENT HOSPITAL CARE,LEVL III: ICD-10-PCS | Mod: ,,, | Performed by: HOSPITALIST

## 2019-12-07 PROCEDURE — 36415 COLL VENOUS BLD VENIPUNCTURE: CPT

## 2019-12-07 PROCEDURE — 80053 COMPREHEN METABOLIC PANEL: CPT

## 2019-12-07 PROCEDURE — 99223 1ST HOSP IP/OBS HIGH 75: CPT | Mod: ,,, | Performed by: INTERNAL MEDICINE

## 2019-12-07 PROCEDURE — 87205 SMEAR GRAM STAIN: CPT

## 2019-12-07 PROCEDURE — 20600001 HC STEP DOWN PRIVATE ROOM

## 2019-12-07 PROCEDURE — 84100 ASSAY OF PHOSPHORUS: CPT

## 2019-12-07 PROCEDURE — 80321 ALCOHOLS BIOMARKERS 1OR 2: CPT

## 2019-12-07 PROCEDURE — 81003 URINALYSIS AUTO W/O SCOPE: CPT

## 2019-12-07 PROCEDURE — S0030 INJECTION, METRONIDAZOLE: HCPCS | Performed by: HOSPITALIST

## 2019-12-07 PROCEDURE — 82570 ASSAY OF URINE CREATININE: CPT

## 2019-12-07 PROCEDURE — 63600175 PHARM REV CODE 636 W HCPCS: Performed by: HOSPITALIST

## 2019-12-07 PROCEDURE — 80195 ASSAY OF SIROLIMUS: CPT

## 2019-12-07 PROCEDURE — 85025 COMPLETE CBC W/AUTO DIFF WBC: CPT

## 2019-12-07 PROCEDURE — 84134 ASSAY OF PREALBUMIN: CPT

## 2019-12-07 PROCEDURE — 85652 RBC SED RATE AUTOMATED: CPT

## 2019-12-07 PROCEDURE — 84300 ASSAY OF URINE SODIUM: CPT

## 2019-12-07 PROCEDURE — 83735 ASSAY OF MAGNESIUM: CPT

## 2019-12-07 PROCEDURE — 83540 ASSAY OF IRON: CPT

## 2019-12-07 PROCEDURE — 82728 ASSAY OF FERRITIN: CPT

## 2019-12-07 PROCEDURE — 99233 SBSQ HOSP IP/OBS HIGH 50: CPT | Mod: ,,, | Performed by: HOSPITALIST

## 2019-12-07 PROCEDURE — 80307 DRUG TEST PRSMV CHEM ANLYZR: CPT

## 2019-12-07 PROCEDURE — 25000003 PHARM REV CODE 250: Performed by: HOSPITALIST

## 2019-12-07 RX ORDER — AMOXICILLIN 250 MG
2 CAPSULE ORAL 2 TIMES DAILY
Status: DISCONTINUED | OUTPATIENT
Start: 2019-12-07 | End: 2019-12-07

## 2019-12-07 RX ORDER — OXYCODONE AND ACETAMINOPHEN 10; 325 MG/1; MG/1
TABLET ORAL
Status: COMPLETED
Start: 2019-12-07 | End: 2019-12-07

## 2019-12-07 RX ORDER — ERGOCALCIFEROL 1.25 MG/1
50000 CAPSULE ORAL
Status: DISCONTINUED | OUTPATIENT
Start: 2019-12-07 | End: 2019-12-11 | Stop reason: HOSPADM

## 2019-12-07 RX ORDER — ONDANSETRON 2 MG/ML
8 INJECTION INTRAMUSCULAR; INTRAVENOUS EVERY 8 HOURS PRN
Status: DISCONTINUED | OUTPATIENT
Start: 2019-12-07 | End: 2019-12-11 | Stop reason: HOSPADM

## 2019-12-07 RX ADMIN — OXYCODONE HYDROCHLORIDE 15 MG: 10 TABLET ORAL at 08:12

## 2019-12-07 RX ADMIN — HYDROMORPHONE HYDROCHLORIDE 1 MG: 1 INJECTION, SOLUTION INTRAMUSCULAR; INTRAVENOUS; SUBCUTANEOUS at 11:12

## 2019-12-07 RX ADMIN — SODIUM CHLORIDE 1000 ML: 0.9 INJECTION, SOLUTION INTRAVENOUS at 03:12

## 2019-12-07 RX ADMIN — METRONIDAZOLE 500 MG: 500 INJECTION, SOLUTION INTRAVENOUS at 01:12

## 2019-12-07 RX ADMIN — CIPROFLOXACIN 400 MG: 2 INJECTION, SOLUTION INTRAVENOUS at 10:12

## 2019-12-07 RX ADMIN — ERGOCALCIFEROL 50000 UNITS: 1.25 CAPSULE ORAL at 05:12

## 2019-12-07 RX ADMIN — PANTOPRAZOLE SODIUM 40 MG: 40 TABLET, DELAYED RELEASE ORAL at 10:12

## 2019-12-07 RX ADMIN — SIROLIMUS 0.5 MG: 0.5 TABLET, SUGAR COATED ORAL at 12:12

## 2019-12-07 RX ADMIN — DICYCLOMINE HYDROCHLORIDE 10 MG: 10 CAPSULE ORAL at 08:12

## 2019-12-07 RX ADMIN — DICYCLOMINE HYDROCHLORIDE 10 MG: 10 CAPSULE ORAL at 10:12

## 2019-12-07 RX ADMIN — ONDANSETRON 8 MG: 2 INJECTION INTRAMUSCULAR; INTRAVENOUS at 08:12

## 2019-12-07 RX ADMIN — METRONIDAZOLE 500 MG: 500 INJECTION, SOLUTION INTRAVENOUS at 07:12

## 2019-12-07 RX ADMIN — CIPROFLOXACIN 400 MG: 2 INJECTION, SOLUTION INTRAVENOUS at 08:12

## 2019-12-07 RX ADMIN — SODIUM CHLORIDE: 0.9 INJECTION, SOLUTION INTRAVENOUS at 11:12

## 2019-12-07 RX ADMIN — HYDROMORPHONE HYDROCHLORIDE 1 MG: 1 INJECTION, SOLUTION INTRAMUSCULAR; INTRAVENOUS; SUBCUTANEOUS at 03:12

## 2019-12-07 RX ADMIN — DICYCLOMINE HYDROCHLORIDE 10 MG: 10 CAPSULE ORAL at 05:12

## 2019-12-07 RX ADMIN — METRONIDAZOLE 500 MG: 500 INJECTION, SOLUTION INTRAVENOUS at 12:12

## 2019-12-07 RX ADMIN — PROMETHAZINE HYDROCHLORIDE 25 MG: 25 INJECTION INTRAMUSCULAR; INTRAVENOUS at 10:12

## 2019-12-07 RX ADMIN — OXYCODONE HYDROCHLORIDE 15 MG: 10 TABLET ORAL at 03:12

## 2019-12-07 NOTE — PHARMACY MED REC
"Admission Medication Reconciliation - Pharmacy Consult Note    The home medication history was taken by Mandy Leonardo, Pharmacy Technician.  Based on information gathered and subsequent review by the clinical pharmacist, the items below may need attention.     You may go to "Admission" then "Reconcile Home Medications" tabs to review and/or act upon these items.     Potentially problematic discrepancies with current MAR  o Patient IS NOT taking the following which was ordered upon admit  o Pantoprazole 40 mg oral daily   o Dicyclomine 10 mg oral four times daily    Please address this information as you see fit.  Feel free to contact us if you have any questions or require assistance.    Thank you,   Mara Galvez, PharmD  Emergency Medicine Clinical Pharmacist  [phone] (2pm-midnight daily)    .    .            "

## 2019-12-07 NOTE — PLAN OF CARE
Problem: Oral Intake Inadequate  Goal: Improved Oral Intake  Outcome: Ongoing, Progressing     Recommendations     Recommendation/Intervention:   1.) Continue bland diet.   2.) If pt wishes to advance diet, suggest renal diet restrictions.   3.) Suggest Novasource Renal versus Boost Plus.   4.) Suggest MVI.   5.) Daily weights     Goals: 1.) Pt to consume/tolerate >75% EEN and EPN by follow up.   Nutrition Goal Status: new  Communication of RD Recs: (POC)

## 2019-12-07 NOTE — CONSULTS
[facility]  Infectious Disease  Consult Note    Patient Name: Jhonny Diana  MRN: [id number]  Admission Date: 12/6/2019  Hospital Length of Stay: 1 days  Attending Physician: Armand Carrasco MD  Primary Care Provider: Primary Doctor No     Isolation Status: Special Contact    Patient information was obtained from patient, relative(s), past medical records and ER records.      Inpatient consult to Infectious Diseases  Consult performed by: Cornel Siddiqui MD  Consult ordered by: Armand Carrasco MD        Assessment/Plan:     S/P liver transplant  28 y/o M PMhx alcoholic cirrhosis s/p DDLT 10/19/2017 (CMV D+/R+, steroid induction, on maintenance sirolimus) c/b seizures, LAURA, superificial wound infection s/p wound vac closure, culture negative peritonitis due to suspected indolent biliary leakage s/p repeated sphincterotomy/biliary stent placement, subsequent loculated RLQ collection s/p perc-drainage and prolonged empiric antibiotics course, gastritis/duodenal ulcer 3/6/2018, and 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     Check C Diff  Repeat stool ova and parasite  Will check additional stool studies  C/w flagyl & cipro  F/u CMV PCR  Recommend sampling of fluid collection vs ascites & would send for cell count w/ diff, as well as cultures (aerobic, anaerobic, fungal, AFB)        Thank you for your consult. I will follow-up with patient. Please contact us if you have any additional questions.    Cornel Siddiqui MD  Infectious Disease  [facility]    Subjective:     Principal Problem: Enteritis of infectious origin    HPI:  28 y/o M PMhx alcoholic cirrhosis s/p DDLT 10/19/2017 (CMV D+/R+, steroid induction, on maintenance sirolimus) c/b seizures, LAURA, superificial wound infection s/p wound vac closure, culture negative peritonitis due to suspected indolent biliary leakage s/p repeated sphincterotomy/biliary stent placement, subsequent loculated RLQ collection s/p perc-drainage and prolonged empiric antibiotics course, gastritis/duodenal ulcer 3/6/2018, and 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     Past Medical History:   Diagnosis Date    Abdominal pain 5/27/2018    Alcoholic cirrhosis 8/14/2018    Alcoholic hepatitis with ascites     Anemia     CKD (chronic kidney disease) stage 3, GFR 30-59 ml/min 8/14/2018    Encounter for blood transfusion     multiple blood transfusions    ESRD on dialysis     History of hematemesis 9/28/2017    Hypertension     Seizures     Substance abuse     alcohol and opioids    Thrombocytopenia     Transplanted liver     10/2017       Past Surgical History:   Procedure Laterality Date    APPENDECTOMY      CENTRAL VENOUS CATHETER TUNNELED INSERTION DOUBLE LUMEN      inserted and removed    ERCP      ERCP N/A 7/2/2018    Procedure: ERCP;  Surgeon: Solis Villanueva MD;  Location: [address]);  Service: Endoscopy;  Laterality: N/A;  dialysis/cirrhosis/labs prior to ERCP/svn  platelet    ESOPHAGOGASTRODUODENOSCOPY      ESOPHAGOGASTRODUODENOSCOPY N/A 11/25/2019    Procedure: EGD (ESOPHAGOGASTRODUODENOSCOPY);  Surgeon: Sheri Siddiqui MD;  Location: [facility] ([address] [place] [address]);  Service: Endoscopy;  Laterality: N/A;    LIVER TRANSPLANT       10/2017       Review of patient's allergies indicates:   Allergen Reactions    Bactrim [sulfamethoxazole-trimethoprim] Other (See Comments)     Mookie Trell Syndrome       Medications:  Medications Prior to Admission   Medication Sig    bisacodyl (DULCOLAX) 5 mg EC tablet Take 5 mg by mouth daily as needed for Constipation.    ergocalciferol (ERGOCALCIFEROL) 50,000 unit Cap Take 1 capsule (50,000 Units total) by mouth every 7 days. (Patient taking differently: Take 50,000 Units by mouth every 7 days. Saturday)    metroNIDAZOLE (FLAGYL) 500 MG tablet Take 1 tablet (500 mg total) by mouth every 8 (eight) hours. for 6 days    promethazine (PHENERGAN) 25 MG tablet Take 1 tablet (25 mg total) by mouth every 6 (six) hours as needed for Nausea.    sirolimus 0.5 mg Tab Take 1 tablet (0.5 mg total) by mouth once daily.    acetaminophen (TYLENOL ORAL) Take 1 tablet by mouth daily as needed (for fever).     Antibiotics (From admission, onward)    Start     Stop Route Frequency Ordered    12/06/19 1815  metronidazole IVPB 500 mg      -- IV Every 8 hours (non-standard times) 12/06/19 1702    12/06/19 1815  ciprofloxacin (CIPRO)400mg/200ml D5W IVPB 400 mg      -- IV Every 12 hours (non-standard times) 12/06/19 1702        Antifungals (From admission, onward)    None        Antivirals (From admission, onward)    None           Immunization History   Administered Date(s) Administered    Hepatitis A, Adult 08/27/2018    Hepatitis A, Pediatric/Adolescent, 2 Dose 10/11/2017    Hepatitis B, Adult 10/12/2017    Influenza - High Dose - PF (65 years and older) 10/12/2017    Pneumococcal Conjugate - 13 Valent 10/12/2017    Pneumococcal Polysaccharide - 23 Valent 08/27/2018    Tdap 10/11/2017       Family History     Problem Relation (Age of Onset)    Alcohol abuse Father    Cancer Father    No Known Problems Mother, Sister, Brother        Social History     Socioeconomic History    Marital status: Single     Spouse name:  Not on file    Number of children: Not on file    Years of education: Not on file    Highest education level: Not on file   Occupational History     Comment: full time    Social Needs    Financial resource strain: Not on file    Food insecurity:     Worry: Not on file     Inability: Not on file    Transportation needs:     Medical: Not on file     Non-medical: Not on file   Tobacco Use    Smoking status: Former Smoker     Packs/day: 1.00     Years: 10.00     Pack years: 10.00     Types: Cigarettes     Last attempt to quit: 2017     Years since quittin.7    Smokeless tobacco: Never Used   Substance and Sexual Activity    Alcohol use: No     Comment: a fifth of liquor daily for years, cut back over last 2 months    Drug use: No    Sexual activity: Not on file     Comment: single   Lifestyle    Physical activity:     Days per week: Not on file     Minutes per session: Not on file    Stress: Not on file   Relationships    Social connections:     Talks on phone: Not on file     Gets together: Not on file     Attends Yarsani service: Not on file     Active member of club or organization: Not on file     Attends meetings of clubs or organizations: Not on file     Relationship status: Not on file   Other Topics Concern    Not on file   Social History Narrative    He lives in [facility] with his roommate      Review of Systems   Constitutional: Negative for chills and fever.   HENT: Negative for trouble swallowing.    Eyes: Negative for photophobia.   Respiratory: Negative for shortness of breath.    Cardiovascular: Negative for chest pain and leg swelling.   Gastrointestinal: Positive for abdominal distention, abdominal pain, diarrhea, nausea and vomiting.   Genitourinary: Negative for dysuria.   Musculoskeletal: Negative for arthralgias and myalgias.   Skin: Negative for rash.   Neurological: Negative for headaches.   Psychiatric/Behavioral: Negative for agitation and confusion.     Objective:      Vital Signs (Most Recent):  Temp: 98.2 °F (36.8 °C) (12/07/19 1228)  Pulse: 86 (12/07/19 1228)  Resp: 18 (12/07/19 1228)  BP: 128/78 (12/07/19 1228)  SpO2: 99 % (12/07/19 1228) Vital Signs (24h Range):  Temp:  [96.9 °F (36.1 °C)-99.2 °F (37.3 °C)] 98.2 °F (36.8 °C)  Pulse:  [] 86  Resp:  [17-18] 18  SpO2:  [97 %-100 %] 99 %  BP: (128-152)/(78-91) 128/78     Weight: 65.1 kg (143 lb 8.3 oz)  Body mass index is 23.16 kg/m².    Estimated Creatinine Clearance: 57.3 mL/min (A) (based on SCr of 1.7 mg/dL (H)).    Physical Exam   Constitutional: He is oriented to person, place, and time. He appears well-developed. No distress.   HENT:   Head: Normocephalic and atraumatic.   Mouth/Throat: Oropharynx is clear and moist. No oropharyngeal exudate.   Eyes: Conjunctivae are normal. Right eye exhibits no discharge. Left eye exhibits no discharge. No scleral icterus.   Neck: Normal range of motion. Neck supple.   Cardiovascular: Normal rate, regular rhythm and intact distal pulses.   Pulmonary/Chest: Effort normal and breath sounds normal. No stridor. No respiratory distress. He has no wheezes. He has no rales.   Abdominal: Soft. Bowel sounds are normal. He exhibits distension. There is tenderness. There is no guarding.   Reducible umbilical hernia   Musculoskeletal: He exhibits no edema or tenderness.   Lymphadenopathy:     He has no cervical adenopathy.   Neurological: He is alert and oriented to person, place, and time.   Skin: Skin is warm. He is not diaphoretic.   Psychiatric: He has a normal mood and affect. His behavior is normal.   Nursing note and vitals reviewed.      Significant Labs:   Blood Culture:   Recent Labs   Lab 11/23/19 1953 11/27/19  0944 11/27/19  0954 12/06/19  1738 12/06/19  1739   LABBLOO No growth after 5 days.  No growth after 5 days. No growth after 5 days. No growth after 5 days. No Growth to date No Growth to date     CBC:   Recent Labs   Lab 12/06/19  1739 12/07/19  0655   WBC 7.00 5.66    HGB 8.6* 7.9*   HCT 27.5* 25.0*    209     CMP:   Recent Labs   Lab 12/06/19 1739 12/07/19  0656    138   K 4.4 3.8    107   CO2 20* 21*   GLU 65* 115*   BUN 15 14   CREATININE 2.0* 1.7*   CALCIUM 9.2 8.1*   PROT 8.0 7.1   ALBUMIN 2.7* 2.4*   BILITOT 0.4 0.2   ALKPHOS 142* 125   AST 22 19   ALT 8* 7*   ANIONGAP 14 10   EGFRNONAA 43.5* 52.9*     Microbiology Results (last 7 days)     Procedure Component Value Units Date/Time    Culture, Anaerobe [[id number]]     Order Status:  No result Specimen:  Body Fluid     Aerobic culture [[id number]]     Order Status:  No result Specimen:  Body Fluid     Blood culture [[id number]] Collected:  12/06/19 1739    Order Status:  Completed Specimen:  Blood Updated:  12/07/19 0545     Blood Culture, Routine No Growth to date    Blood culture [[id number]] Collected:  12/06/19 1738    Order Status:  Completed Specimen:  Blood Updated:  12/07/19 0545     Blood Culture, Routine No Growth to date    Stool culture [[id number]]     Order Status:  No result Specimen:  Stool     Clostridium difficile EIA [[id number]]     Order Status:  No result Specimen:  Stool           Significant Imaging: I have reviewed all pertinent imaging results/findings within the past 24 hours.

## 2019-12-07 NOTE — HPI
Mr Diana is a 29 year old man with history of ETOH Hep s/p OLT 10/19/17, with post-transplant period notable for LAURA previously on HD, anastomotic stricture, ETOH relapse (6/2019), who is presenting to the hospital due n/v/d.    He was recently admitted (11/22) due to concern for acute on chronic abdominal pain, fever, and imaging at OSH concerning for enteritis. He was hospitalized at OSH with concern for enteritis, with antibiotics and subsequently presented to [facility] [place] workup. While admitted, stool O&P was notable for giardia with improvement in his symptoms with antibiotics and was discharged.    He notes he was initially doing well after leaving the hospital but developed nausea, vomiting and diarrhea and inability to tolerate PO and medications therefore presented to the hospital. Was unable to tolerate his flagyl but was able to take his sirolimus. Denies f/c/s.  Endorses diffuse abdominal pain (predominantly RUQ) which is slightly worse than baseline. CT scan was concerning for worsening abdominal fluid (OSH, pending upload).

## 2019-12-07 NOTE — PLAN OF CARE
Pt resting quietly in bed, watching television and playing on computer.  Pt states pain is greatly reduced.  Resp even and unlabored, NAD.

## 2019-12-07 NOTE — SUBJECTIVE & OBJECTIVE
Past Medical History:   Diagnosis Date    Abdominal pain 5/27/2018    Alcoholic cirrhosis 8/14/2018    Alcoholic hepatitis with ascites     Anemia     CKD (chronic kidney disease) stage 3, GFR 30-59 ml/min 8/14/2018    Encounter for blood transfusion     multiple blood transfusions    ESRD on dialysis     History of hematemesis 9/28/2017    Hypertension     Seizures     Substance abuse     alcohol and opioids    Thrombocytopenia     Transplanted liver     10/2017       Past Surgical History:   Procedure Laterality Date    APPENDECTOMY      CENTRAL VENOUS CATHETER TUNNELED INSERTION DOUBLE LUMEN      inserted and removed    ERCP      ERCP N/A 7/2/2018    Procedure: ERCP;  Surgeon: Solis Villanueva MD;  [facility]  Service: Endoscopy;  Laterality: N/A;  dialysis/cirrhosis/labs prior to ERCP/svn  platelet    ESOPHAGOGASTRODUODENOSCOPY      ESOPHAGOGASTRODUODENOSCOPY N/A 11/25/2019    Procedure: EGD (ESOPHAGOGASTRODUODENOSCOPY);  Surgeon: Sheri Siddiqui MD;  Location: [facility] ([address] [place] [address]);  Service: Endoscopy;  Laterality: N/A;    LIVER TRANSPLANT      10/2017       Review of patient's allergies indicates:   Allergen Reactions    Bactrim [sulfamethoxazole-trimethoprim] Other (See Comments)     Mookie Trell Syndrome       Medications:  Medications Prior to Admission   Medication Sig    bisacodyl (DULCOLAX) 5 mg EC tablet Take 5 mg by mouth daily as needed for Constipation.    ergocalciferol (ERGOCALCIFEROL) 50,000 unit Cap Take 1 capsule (50,000 Units total) by mouth every 7 days. (Patient taking differently: Take 50,000 Units by mouth every 7 days. Saturday)    metroNIDAZOLE (FLAGYL) 500 MG tablet Take 1 tablet (500 mg total) by mouth every 8 (eight) hours. for 6 days    promethazine (PHENERGAN) 25 MG tablet Take 1 tablet (25 mg total) by mouth every 6 (six) hours as needed for Nausea.    sirolimus 0.5 mg Tab Take 1 tablet (0.5 mg total) by mouth once daily.     acetaminophen (TYLENOL ORAL) Take 1 tablet by mouth daily as needed (for fever).     Antibiotics (From admission, onward)    Start     Stop Route Frequency Ordered    19 181  metronidazole IVPB 500 mg      -- IV Every 8 hours (non-standard times) 19 17019 181  ciprofloxacin (CIPRO)400mg/200ml D5W IVPB 400 mg      -- IV Every 12 hours (non-standard times) 19 170        Antifungals (From admission, onward)    None        Antivirals (From admission, onward)    None           Immunization History   Administered Date(s) Administered    Hepatitis A, Adult 2018    Hepatitis A, Pediatric/Adolescent, 2 Dose 10/11/2017    Hepatitis B, Adult 10/12/2017    Influenza - High Dose - PF (65 years and older) 10/12/2017    Pneumococcal Conjugate - 13 Valent 10/12/2017    Pneumococcal Polysaccharide - 23 Valent 2018    Tdap 10/11/2017       Family History     Problem Relation (Age of Onset)    Alcohol abuse Father    Cancer Father    No Known Problems Mother, Sister, Brother        Social History     Socioeconomic History    Marital status: Single     Spouse name: Not on file    Number of children: Not on file    Years of education: Not on file    Highest education level: Not on file   Occupational History     Comment: full time    Social Needs    Financial resource strain: Not on file    Food insecurity:     Worry: Not on file     Inability: Not on file    Transportation needs:     Medical: Not on file     Non-medical: Not on file   Tobacco Use    Smoking status: Former Smoker     Packs/day: 1.00     Years: 10.00     Pack years: 10.00     Types: Cigarettes     Last attempt to quit: 2017     Years since quittin.7    Smokeless tobacco: Never Used   Substance and Sexual Activity    Alcohol use: No     Comment: a fifth of liquor daily for years, cut back over last 2 months    Drug use: No    Sexual activity: Not on file     Comment: single   Lifestyle    Physical  activity:     Days per week: Not on file     Minutes per session: Not on file    Stress: Not on file   Relationships    Social connections:     Talks on phone: Not on file     Gets together: Not on file     Attends [employer] service: Not on file     Active member of club or organization: Not on file     Attends meetings of clubs or organizations: Not on file     Relationship status: Not on file   Other Topics Concern    Not on file   Social History Narrative    He lives in [facility] with his roommate      Review of Systems   Constitutional: Negative for chills and fever.   HENT: Negative for trouble swallowing.    Eyes: Negative for photophobia.   Respiratory: Negative for shortness of breath.    Cardiovascular: Negative for chest pain and leg swelling.   Gastrointestinal: Positive for abdominal distention, abdominal pain, diarrhea, nausea and vomiting.   Genitourinary: Negative for dysuria.   Musculoskeletal: Negative for arthralgias and myalgias.   Skin: Negative for rash.   Neurological: Negative for headaches.   Psychiatric/Behavioral: Negative for agitation and confusion.     Objective:     Vital Signs (Most Recent):  Temp: 98.2 °F (36.8 °C) (12/07/19 1228)  Pulse: 86 (12/07/19 1228)  Resp: 18 (12/07/19 1228)  BP: 128/78 (12/07/19 1228)  SpO2: 99 % (12/07/19 1228) Vital Signs (24h Range):  Temp:  [96.9 °F (36.1 °C)-99.2 °F (37.3 °C)] 98.2 °F (36.8 °C)  Pulse:  [] 86  Resp:  [17-18] 18  SpO2:  [97 %-100 %] 99 %  BP: (128-152)/(78-91) 128/78     Weight: 65.1 kg (143 lb 8.3 oz)  Body mass index is 23.16 kg/m².    Estimated Creatinine Clearance: 57.3 mL/min (A) (based on SCr of 1.7 mg/dL (H)).    Physical Exam   Constitutional: He is oriented to person, place, and time. He appears well-developed. No distress.   HENT:   Head: Normocephalic and atraumatic.   Mouth/Throat: Oropharynx is clear and moist. No oropharyngeal exudate.   Eyes: Conjunctivae are normal. Right eye exhibits no discharge. Left eye  exhibits no discharge. No scleral icterus.   Neck: Normal range of motion. Neck supple.   Cardiovascular: Normal rate, regular rhythm and intact distal pulses.   Pulmonary/Chest: Effort normal and breath sounds normal. No stridor. No respiratory distress. He has no wheezes. He has no rales.   Abdominal: Soft. Bowel sounds are normal. He exhibits distension. There is tenderness. There is no guarding.   Reducible umbilical hernia   Musculoskeletal: He exhibits no edema or tenderness.   Lymphadenopathy:     He has no cervical adenopathy.   Neurological: He is alert and oriented to person, place, and time.   Skin: Skin is warm. He is not diaphoretic.   Psychiatric: He has a normal mood and affect. His behavior is normal.   Nursing note and vitals reviewed.      Significant Labs:   Blood Culture:   Recent Labs   Lab 11/23/19 1953 11/27/19 0944 11/27/19 0954 12/06/19 1738 12/06/19 1739   LABBLOO No growth after 5 days.  No growth after 5 days. No growth after 5 days. No growth after 5 days. No Growth to date No Growth to date     CBC:   Recent Labs   Lab 12/06/19 1739 12/07/19  0655   WBC 7.00 5.66   HGB 8.6* 7.9*   HCT 27.5* 25.0*    209     CMP:   Recent Labs   Lab 12/06/19 1739 12/07/19  0656    138   K 4.4 3.8    107   CO2 20* 21*   GLU 65* 115*   BUN 15 14   CREATININE 2.0* 1.7*   CALCIUM 9.2 8.1*   PROT 8.0 7.1   ALBUMIN 2.7* 2.4*   BILITOT 0.4 0.2   ALKPHOS 142* 125   AST 22 19   ALT 8* 7*   ANIONGAP 14 10   EGFRNONAA 43.5* 52.9*     Microbiology Results (last 7 days)     Procedure Component Value Units Date/Time    Culture, Anaerobe [[id number]]     Order Status:  No result Specimen:  Body Fluid     Aerobic culture [[id number]]     Order Status:  No result Specimen:  Body Fluid     Blood culture [[id number]] Collected:  12/06/19 1739    Order Status:  Completed Specimen:  Blood Updated:  12/07/19 0545     Blood Culture, Routine No Growth to date    Blood culture [[id number]] Collected:   12/06/19 1738    Order Status:  Completed Specimen:  Blood Updated:  12/07/19 0545     Blood Culture, Routine No Growth to date    Stool culture [[id number]]     Order Status:  No result Specimen:  Stool     Clostridium difficile EIA [[id number]]     Order Status:  No result Specimen:  Stool           Significant Imaging: I have reviewed all pertinent imaging results/findings within the past 24 hours.

## 2019-12-07 NOTE — ASSESSMENT & PLAN NOTE
Mr Diana is a 29 year old man with history of ETOH Hep s/p OLT 10/19/17, with post-transplant period notable for LAURA previously on HD, anastomotic stricture, ETOH relapse (6/2019), who is presenting to the hospital due n/v/d.    Recently admitted with abdominal pain, imaging ?enteritis with workup +ve giardia with improvement on treatment, but subsequent n/v/d and inability to tolerate PO. Imaging with ?increasing abdominal fluid collection (pending upload).    Plan  - sirolimus: continue 0.5mg daily, LFTs stable. Level <2 on admission, may be due to poor PO tolerance.  - Giardia. Recent giardia diagnosis (11/28), unable to tolerate outpatient treatment. Will resume flagyl  - diarrhea. Reports improved since admission. Will r/o c.diff given recent hospitalization and antibiotics  - LAURA. Had LAURA during recent admission which was improving, returning with Crt 1.6 -> 2.0, likely due to pre-renal. Improving with hydration  - fluid collection. Will upload images from OSH for review. If increasing will ask IR to sample fluid to further characterize it. On antibiotics.

## 2019-12-07 NOTE — SUBJECTIVE & OBJECTIVE
Review of Systems   Constitutional: Negative for activity change, appetite change, chills, diaphoresis, fatigue, fever and unexpected weight change.   HENT: Negative for sore throat and trouble swallowing.    Eyes: Negative for visual disturbance.   Respiratory: Negative for chest tightness and shortness of breath.    Cardiovascular: Negative for chest pain and leg swelling.   Gastrointestinal: Positive for abdominal pain, diarrhea, nausea and vomiting. Negative for abdominal distention, anal bleeding, blood in stool and constipation.   Genitourinary: Negative for dysuria and hematuria.   Musculoskeletal: Negative for arthralgias and myalgias.   Skin: Negative for rash.   Neurological: Negative for dizziness, weakness, light-headedness and headaches.   Psychiatric/Behavioral: Negative for agitation and confusion.       Past Medical History:   Diagnosis Date    Abdominal pain 5/27/2018    Alcoholic cirrhosis 8/14/2018    Alcoholic hepatitis with ascites     Anemia     CKD (chronic kidney disease) stage 3, GFR 30-59 ml/min 8/14/2018    Encounter for blood transfusion     multiple blood transfusions    ESRD on dialysis     History of hematemesis 9/28/2017    Hypertension     Seizures     Substance abuse     alcohol and opioids    Thrombocytopenia     Transplanted liver     10/2017       Past Surgical History:   Procedure Laterality Date    APPENDECTOMY      CENTRAL VENOUS CATHETER TUNNELED INSERTION DOUBLE LUMEN      inserted and removed    ERCP      ERCP N/A 7/2/2018    Procedure: ERCP;  Surgeon: Solis Villanueva MD;  Location: [facility] ([address] [place] [address]);  Service: Endoscopy;  Laterality: N/A;  dialysis/cirrhosis/labs prior to ERCP/svn  platelet    ESOPHAGOGASTRODUODENOSCOPY      ESOPHAGOGASTRODUODENOSCOPY N/A 11/25/2019    Procedure: EGD (ESOPHAGOGASTRODUODENOSCOPY);  Surgeon: Sheri Siddiqui MD;  Location: [address]);  Service: Endoscopy;  Laterality: N/A;    LIVER TRANSPLANT      10/2017        Family history of liver disease: No    Review of patient's allergies indicates:   Allergen Reactions    Bactrim [sulfamethoxazole-trimethoprim] Other (See Comments)     Mookie Trell Syndrome       Tobacco Use    Smoking status: Former Smoker     Packs/day: 1.00     Years: 10.00     Pack years: 10.00     Types: Cigarettes     Last attempt to quit: 2017     Years since quittin.7    Smokeless tobacco: Never Used   Substance and Sexual Activity    Alcohol use: No     Comment: a fifth of liquor daily for years, cut back over last 2 months    Drug use: No    Sexual activity: Not on file     Comment: single       Medications Prior to Admission   Medication Sig Dispense Refill Last Dose    acetaminophen (TYLENOL ORAL) Take 1 tablet by mouth daily as needed (for fever).       bisacodyl (DULCOLAX) 5 mg EC tablet Take 5 mg by mouth daily as needed for Constipation.       ergocalciferol (ERGOCALCIFEROL) 50,000 unit Cap Take 1 capsule (50,000 Units total) by mouth every 7 days. 12 capsule 0     [] metroNIDAZOLE (FLAGYL) 500 MG tablet Take 1 tablet (500 mg total) by mouth every 8 (eight) hours. for 6 days 18 tablet 0     promethazine (PHENERGAN) 25 MG tablet Take 1 tablet (25 mg total) by mouth every 6 (six) hours as needed for Nausea. 30 tablet 0     sirolimus 0.5 mg Tab Take 1 tablet (0.5 mg total) by mouth once daily. 30 tablet 3        Objective:     Vital Signs (Most Recent):  Temp: 98.7 °F (37.1 °C) (19)  Pulse: 98 (19)  Resp: 18 (19)  BP: (!) 142/80 (19)  SpO2: 100 % (19) Vital Signs (24h Range):  Temp:  [96.9 °F (36.1 °C)-99.2 °F (37.3 °C)] 98.7 °F (37.1 °C)  Pulse:  [] 98  Resp:  [17-18] 18  SpO2:  [97 %-100 %] 100 %  BP: (130-152)/(78-91) 142/80     Weight: 65.1 kg (143 lb 8.3 oz) (19)  Body mass index is 23.16 kg/m².    Physical Exam   Constitutional: He is oriented to person, place, and time. No distress.    Comfortable appearing, sitting in chair, on computer. Family at bedside.   HENT:   Head: Normocephalic and atraumatic.   Mouth/Throat: Oropharynx is clear and moist. No oropharyngeal exudate.   Eyes: Conjunctivae are normal. No scleral icterus.   Neck: No JVD present.   Cardiovascular: Normal rate, regular rhythm, normal heart sounds and intact distal pulses.   Pulmonary/Chest: Effort normal and breath sounds normal. No respiratory distress.   Abdominal: Soft. Bowel sounds are normal. He exhibits no distension and no mass. There is no tenderness. There is no rebound and no guarding.   Soft, mild tenderness diffusely. Non-distended. Soft, reducible umbilical hernia.   Musculoskeletal: He exhibits no edema or tenderness.   Bony prominence along inferior right sternum at costochondral junction. Non-fluctuant. Non-tender.    Lymphadenopathy:     He has no cervical adenopathy.   Neurological: He is alert and oriented to person, place, and time.   Skin: Skin is warm. Capillary refill takes less than 2 seconds. He is not diaphoretic.   Psychiatric: He has a normal mood and affect. His behavior is normal. Judgment and thought content normal.   Nursing note and vitals reviewed.      MELD-Na score: 12 at 12/7/2019  6:56 AM  MELD score: 12 at 12/7/2019  6:56 AM  Calculated from:  Serum Creatinine: 1.7 mg/dL at 12/7/2019  6:56 AM  Serum Sodium: 138 mmol/L (Rounded to 137 mmol/L) at 12/7/2019  6:56 AM  Total Bilirubin: 0.2 mg/dL (Rounded to 1 mg/dL) at 12/7/2019  6:56 AM  INR(ratio): 1.0 at 12/6/2019  5:39 PM  Age: 29 years    Significant Labs:  Labs within the past month have been reviewed.    Significant Imaging:  Labs: Reviewed

## 2019-12-07 NOTE — H&P
History and Physical  Hospital Medicine       Patient Name: Jhonny Diana  MRN:  [id number]  Hospital Medicine Team: Networked reference to record PCT  Armand Carrasco MD  Date of Admission:  12/6/2019     Principal Problem:  Enteritis of infectious origin   Primary Care Physician: Primary Doctor No      History of Present Illness:     Patient is a 29 y.o. male who has a past medical history of HTN and Liver transplant performed 10/19/17 secondary to acute alcoholic hepatitis with multiple post-op complications including biliary stenosis s/p multiple ERCPs, adhesions, abdominal infections and prolonged LAURA requiring dialysis presented with RUQ pain, nausea, vomiting and diarrhea. Patient was recently discharged from [facility] where he initially presented with abdominal pain and MRI of abdomen showed changes consistent with enteritis. CT Abd showed significant constipation in colon. He had extensive work up to exclude obstruction (general surgery stated no surgical intervention required), US Mesenteric arteria to exclude Ischemia, and EGD for biopsy to exclude CMV. Stool ova and parasites was positive for Giardia. Patient was started on zosyn on admission and ID recommended 7 day course of oral Metronidazole 500 mg po tid. He was then set up for ID follow up for Giardia and Hepatology appointment for immunosuppressant therapy dosing. After discharge patient had difficulty getting his prescriptions filled as they were sent to the wrong pharmacy. He was able to fill his prescriptions and started taking the antibiotics. He now presents with similar symptoms and because of the nausea and vomiting he was unable to keep his antibiotics down. Repeat CT scan in ED showed normal bowel but increased ascites. He is otherwise hemodynamically stable. Hepatology aware of patient and have accepted the patient for transfer    Patient states his pain in his RUQ, 8/10 currently, non radiating and has had 4 episodes of N/V and 3 watery stools  which has worsened since his discharge.      Review of Systems   Constitutional: Negative for chills, fatigue, fever.   HENT: Negative for sore throat, trouble swallowing.    Eyes: Negative for photophobia, visual disturbance.   Respiratory: Negative for cough, shortness of breath.    Cardiovascular: Negative for chest pain, palpitations, leg swelling.   Gastrointestinal: positive for abdominal pain, negative constipation, positive diarrhea, nausea, vomiting.   Endocrine: Negative for cold intolerance, heat intolerance.   Genitourinary: Negative for dysuria, frequency.   Musculoskeletal: Negative for arthralgias, myalgias.   Skin: Negative for rash, wound, erythema   Neurological: Negative for dizziness, syncope, weakness, light-headedness.   Psychiatric/Behavioral: Negative for confusion, hallucinations, anxiety  All other systems reviewed and are negative.      Past Medical History: Patient has a past medical history of Abdominal pain (5/27/2018), Alcoholic cirrhosis (8/14/2018), Alcoholic hepatitis with ascites, Anemia, CKD (chronic kidney disease) stage 3, GFR 30-59 ml/min (8/14/2018), Encounter for blood transfusion, ESRD on dialysis, History of hematemesis (9/28/2017), Hypertension, Seizures, Substance abuse, Thrombocytopenia, and Transplanted liver.    Past Surgical History: Patient has a past surgical history that includes Appendectomy; Liver transplant; Esophagogastroduodenoscopy; ERCP; ERCP (N/A, 7/2/2018); Tunneled central venous catheter insertion,  double lumen; and Esophagogastroduodenoscopy (N/A, 11/25/2019).    Social History: Patient reports that he quit smoking about 2 years ago. His smoking use included cigarettes. He has a 10.00 pack-year smoking history. He has never used smokeless tobacco. He reports that he does not drink alcohol or use drugs.    Family History: family history includes Alcohol abuse in his father; Cancer in his father; No Known Problems in his brother, mother, and  sister.    Medications: Scheduled Meds:   ciprofloxacin  400 mg Intravenous Q12H    dicyclomine  10 mg Oral QID    metronidazole  500 mg Intravenous Q8H    pantoprazole  40 mg Oral Daily    sirolimus  0.5 mg Oral Daily    sodium chloride 0.9%  1,000 mL Intravenous Once     Continuous Infusions:   sodium chloride 0.9% 100 mL/hr at 12/06/19 1752     PRN Meds:.acetaminophen, Dextrose 10% Bolus, Dextrose 10% Bolus, glucagon (human recombinant), glucose, glucose, HYDROmorphone, ondansetron, oxyCODONE, promethazine (PHENERGAN) IVPB, sodium chloride 0.9%    Allergies: Patient is allergic to bactrim [sulfamethoxazole-trimethoprim].    Physical Exam:     Vital Signs (Most Recent):  Temp: 99.2 °F (37.3 °C) (12/06/19 2338)  Pulse: 96 (12/06/19 2338)  Resp: 18 (12/06/19 2338)  BP: 137/86 (12/06/19 2338)  SpO2: 97 % (12/06/19 2338) Vital Signs Range (Last 24H):  Temp:  [98.3 °F (36.8 °C)-99.2 °F (37.3 °C)]   Pulse:  []   Resp:  [17-18]   BP: (131-152)/(78-92)   SpO2:  [97 %-100 %]    Body mass index is 23.16 kg/m².     Physical Exam:  Constitutional: appears weak and ill  Head: Normocephalic and atraumatic.   Mouth/Throat: Oropharynx is clear and moist.   Eyes: EOM are normal. Pupils are equal, round, and reactive to light. No scleral icterus.   Neck: Normal range of motion. Neck supple.   Cardiovascular: Normal rate and regular rhythm.  No murmur heard.  Pulmonary/Chest: Effort normal and breath sounds normal. No respiratory distress. No wheezes, rales, or rhonchi  Abdominal: Soft. Bowel sounds are normal.  positive distension, positive RUQ tenderness  Musculoskeletal: Normal range of motion. No edema.   Neurological: Alert and oriented to person, place, and time.   Skin: Skin is warm and dry.   Psychiatric: Normal mood and affect. Behavior is normal.   Vitals reviewed.    Recent Labs   Lab 11/30/19  0541 12/06/19  4395   WBC 4.21 7.00   HGB 7.6* 8.6*   HCT 24.1* 27.5*   * 282       Recent Labs   Lab  11/30/19  0541 12/06/19  1739    138   K 3.9 4.4    104   CO2 22* 20*   BUN 12 15   CREATININE 1.6* 2.0*   GLU 91 65*   CALCIUM 7.9* 9.2     Recent Labs   Lab 11/30/19  0541 12/06/19  1739   ALKPHOS 213* 142*   ALT 14 8*   AST 23 22   ALBUMIN 2.0* 2.7*   PROT 6.7 8.0   BILITOT 0.3 0.4   INR 1.0 1.0      No results for input(s): POCTGLUCOSE in the last 168 hours.      Assessment and Plan:     Mr. Jhonny Diana is a 29 y.o. male who presented to [facility] on 12/6/2019 with     Active Hospital Problems    Diagnosis  POA    *Enteritis of infectious origin [A09]  Yes    Diarrhea [R19.7]  Yes    Gastroenteritis [K52.9]  Yes    LAURA (acute kidney injury) [N17.9]  Yes    Anemia of chronic disease [D63.8]  Yes    Moderate protein malnutrition [E44.0]  Yes    Intractable nausea and vomiting [R11.2]  Yes    S/P liver transplant [Z94.4]  Not Applicable     Chronic    Prophylactic immunotherapy [Z29.8]  Not Applicable     Chronic    Long-term use of immunosuppressant medication [Z79.899]  Not Applicable     Chronic    Metabolic acidosis [E87.2]  Yes      Resolved Hospital Problems    Diagnosis Date Resolved POA    Ascites [R18.8] 05/21/2018 Yes     Added automatically from request for surgery [id number]       # Infectious enteritis   # Giardia infection  # Intractable N/V  # Diarrhea   # Fluid collection   - patient recently diagnosed with giardia and discharged with PO flagly  - states when he got home his symptoms came back, N/V and worsening abdominal pain and diarrhea   - CT ab/pelv at OSH shows worsening fluid collection, image has been sent over and will need to placed in epic; unlikely ascites as patient does not have evidence of cirrhosis in his transplant; patient has had ex-laps which showed brown/green fluid, possible bile leak? Will need to review image with radiology and hepatology  - Ab X ray pending;   - will start on IV Flagyl and Cipro  - checking C. Diff and CMV   - transplant ID consulted    #  H/O Liver transplant   # Immunosuppressed status   - hepatology consulted  - cont rapamune  - check level in AM    # LAURA on CKD stage 3  # Metabolic acidosis   - Cr up to 2, baseline of 1.5  - likely due to dehydration  - 1L NS bolus, then start on 100 cc/hr  - urine studies and renal U/S  - strict I/O    # Microcytic Anemia  - checking iron studies      # Moderate protein jalen malnutrition  - PAB, ensure, dietary    Diet:  bland  GI PPx:    DVT PPx:    Goals of Care:  full       Disposition:  Early next week    Armand Carrasco MD  Medical Director [facility] Medicine  Spectra:  [phone]  Pager: [phone]

## 2019-12-07 NOTE — NURSING
Pt transported to ultrasound via stretcher with transport. Last PO intake at 4am. Dr. Carrasco states okay for pt to have ultrasound at this time.

## 2019-12-07 NOTE — PROGRESS NOTES
Progress Note   Hospital Medicine         Patient Name: Jhonny Diana  MRN:  [id number]  [facility] Medicine Team: [facility] HOSP MED L Armand Carrasco MD  Date of Admission:  12/6/2019     Length of Stay:  LOS: 1 day   Expected Discharge Date: 12/17/2019  Principal Problem:  Enteritis of infectious origin       Subjective:     Interval History/Overnight Events:  Patient doing ok today, states having 1 watery diarrhea episode early in the morning, states pain is well controlled, he is having some nausea and tolerating some bland diet;   - Cr improved with IVFs  - planning to sample fluid on Monday and check cultures, cell count, fungal culture, cytology   - will also review imaging with IR on Monday; concern is why does the fluid collection continue to return, possible bile leak? Or other causes;     Review of Systems   Constitutional: Negative for chills, fatigue, fever.   HENT: Negative for sore throat, trouble swallowing.    Eyes: Negative for photophobia, visual disturbance.   Respiratory: Negative for cough, shortness of breath.    Cardiovascular: Negative for chest pain, palpitations, leg swelling.   Gastrointestinal: positive for abdominal pain, denies constipation, positive diarrhea, nausea, denies vomiting.   Endocrine: Negative for cold intolerance, heat intolerance.   Genitourinary: Negative for dysuria, frequency.   Musculoskeletal: Negative for arthralgias, myalgias.   Skin: Negative for rash, wound, erythema   Neurological: Negative for dizziness, syncope, weakness, light-headedness.   Psychiatric/Behavioral: Negative for confusion, hallucinations, anxiety  All other systems reviewed and are negative.    Objective:     Temp:  [96.9 °F (36.1 °C)-99.2 °F (37.3 °C)]   Pulse:  []   Resp:  [17-18]   BP: (128-152)/(78-91)   SpO2:  [97 %-100 %]       Physical Exam:  Constitutional: appears weak and frail   Head: Normocephalic and atraumatic.   Mouth/Throat: Oropharynx is clear and moist.   Eyes: EOM are normal.  Pupils are equal, round, and reactive to light. No scleral icterus.   Neck: Normal range of motion. Neck supple.   Cardiovascular: Normal rate and regular rhythm.  No murmur heard.  Pulmonary/Chest: Effort normal and breath sounds normal. No respiratory distress. No wheezes, rales, or rhonchi  Abdominal: Soft. Bowel sounds are normal.  positive distension and RUQ tenderness  Musculoskeletal: Normal range of motion. No edema.   Neurological: Alert and oriented to person, place, and time.   Skin: Skin is warm and dry.   Psychiatric: Normal mood and affect. Behavior is normal.     Recent Labs   Lab 12/06/19 1739 12/07/19  0655   WBC 7.00 5.66   HGB 8.6* 7.9*   HCT 27.5* 25.0*    209     Recent Labs   Lab 12/06/19 1739 12/07/19  0656    138   K 4.4 3.8    107   CO2 20* 21*   BUN 15 14   CREATININE 2.0* 1.7*   GLU 65* 115*   CALCIUM 9.2 8.1*   MG  --  1.6   PHOS  --  3.8     Recent Labs   Lab 12/06/19 1739 12/07/19  0656   ALKPHOS 142* 125   ALT 8* 7*   AST 22 19   ALBUMIN 2.7* 2.4*   PROT 8.0 7.1   BILITOT 0.4 0.2   INR 1.0  --      No results for input(s): POCTGLUCOSE in the last 168 hours.     ciprofloxacin  400 mg Intravenous Q12H    dicyclomine  10 mg Oral QID    ergocalciferol  50,000 Units Oral Q7 Days    metronidazole  500 mg Intravenous Q8H    pantoprazole  40 mg Oral Daily    sirolimus  0.5 mg Oral Daily       Assessment and Plan     Mr. Jhonny Diana is a 29 y.o. male who presented to [facility] on 12/6/2019 with     Hospital Course:    Mr. Jhonny Diana was admitted to Hospital Medicine for management of     Active Hospital Problems    Diagnosis  POA    *Enteritis of infectious origin [A09]  Yes    Diarrhea [R19.7]  Yes    Gastroenteritis [K52.9]  Yes    LAURA (acute kidney injury) [N17.9]  Yes    Anemia of chronic disease [D63.8]  Yes    Moderate protein malnutrition [E44.0]  Yes    Intractable nausea and vomiting [R11.2]  Yes    S/P liver transplant [Z94.4]  Not Applicable      Chronic    Prophylactic immunotherapy [Z29.8]  Not Applicable     Chronic    Long-term use of immunosuppressant medication [Z79.899]  Not Applicable     Chronic    Metabolic acidosis [E87.2]  Yes      Resolved Hospital Problems    Diagnosis Date Resolved POA    Ascites [R18.8] 05/21/2018 Yes     Added automatically from request for surgery [id number]           # Infectious enteritis   # Giardia infection  # Intractable N/V  # Diarrhea   # Fluid collection   - patient recently diagnosed with giardia and discharged with PO flagly  - states when he got home his symptoms came back, N/V and worsening abdominal pain and diarrhea   - CT ab/pelv at OSH shows worsening fluid collection, image has been sent over and will need to placed in epic; unlikely ascites as patient does not have evidence of cirrhosis in his transplant; patient has had ex-laps which showed brown/green fluid, possible bile leak? Will need to review image with radiology and hepatology  - Ab X ray reviewed   -  on IV Flagyl and Cipro  - checking C. Diff and CMV   - transplant ID consulted, appreciate recs  - will plan on doing IR fluid aspiration on Monday and send for cultures and cytology  - will also review imaging with IR     # H/O Liver transplant   # Immunosuppressed status   - hepatology consulted  - cont rapamune  - check levels      # LAURA on CKD stage 3  # Metabolic acidosis   - Cr up to 2, baseline of 1.5  - likely due to dehydration  - 1L NS bolus, then start on 100 cc/hr  - urine studies and renal U/S  - strict I/O  - improved to 1.7 today     # Microcytic Anemia  - checking iron studies       # Moderate protein jaeln malnutrition  - PAB, ensure, dietary     Diet:  bland  GI PPx:    DVT PPx:    Goals of Care:  full        Disposition:  Early next week       Armand Carrasco MD  Medical Director [facility] Medicine  Spectra:  [phone]  Pager: [phone]

## 2019-12-07 NOTE — CONSULTS
"  [facility]  Adult Nutrition  Consult Note    SUMMARY     Recommendations    Recommendation/Intervention:   1.) Continue bland diet.   2.) If pt wishes to advance diet, suggest renal diet restrictions.   3.) Suggest Novasource Renal versus Boost Plus.   4.) Suggest MVI.   5.) Daily weights    Goals: 1.) Pt to consume/tolerate >75% EEN and EPN by follow up.   Nutrition Goal Status: new  Communication of RD Recs: (POC)    Reason for Assessment    Reason For Assessment: consult  Diagnosis: other (see comments)(enteritis of infectious origin)  Relevant Medical History: HTN, ETOH hepatitis s/p OLTx 10/2017, seizures, ESRD on HD, substance abuse  Interdisciplinary Rounds: did not attend  General Information Comments: Pt sitting up in chair reports feeling ok. He reports consuming ~50% of bkfst this morning. Per chart, pt consuming 50-75% of meals. He also endorses consuming at least 1 Boost/day. Pt reports PTA, varied appetite 2/2 to N/V/D. Since admit, N/V/D resolved. Pt reports # reflecting +13lb wt gain, likely related to fluid presented in abdomen area. NFPE completed at bedside with mild temporal and clavicle wasting, otherwise appears well nourished. At this time, pt does not meet malnutrition criteria.     Nutrition Risk Screen    Nutrition Risk Screen: no indicators present    Nutrition/Diet History    Spiritual, Cultural Beliefs, Restorationist Practices, Values that Affect Care: no    Anthropometrics    Temp: 98.7 °F (37.1 °C)  Height Method: Stated  Height: 5' 6" (167.6 cm)  Height (inches): 66 in  Weight Method: Bed Scale  Weight: 65.1 kg (143 lb 8.3 oz)  Weight (lb): 143.52 lb  Ideal Body Weight (IBW), Male: 142 lb  % Ideal Body Weight, Male (lb): 101.07 lb  BMI (Calculated): 23.2  BMI Grade: 18.5-24.9 - normal  Usual Body Weight (UBW), k kg(2018)  % Usual Body Weight: 110.57       Lab/Procedures/Meds    Pertinent Labs Reviewed: reviewed  Pertinent Labs Comments: Cr 2.0, GFR " 43.5, Glu 65, , ALT 8  Pertinent Medications Reviewed: reviewed  Pertinent Medications Comments: cipro, dicyclomine, protonix, sirolimus    Estimated/Assessed Needs    Weight Used For Calorie Calculations: 65.1 kg (143 lb 8.3 oz)  Energy Calorie Requirements (kcal): 1953-2279  Energy Need Method: Kcal/kg(30-35 kcal/kg)  Protein Requirements: 78-85(g/day)  Weight Used For Protein Calculations: 65.1 kg (143 lb 8.3 oz)(1.2-1.3 g/kg)     Estimated Fluid Requirement Method: other (see comments)(1000mL + urine output)  RDA Method (mL): 1953     Nutrition Prescription Ordered    Current Diet Order: bland    Evaluation of Received Nutrient/Fluid Intake    I/O: 0  Comments: LBM 12/6  % Intake of Estimated Energy Needs: 50 - 75 %  % Meal Intake: 50 - 75 %    Nutrition Risk    Level of Risk/Frequency of Follow-up: low     Assessment and Plan  Nutrition Problem  Inadequate oral intakes    Related to (etiology):   Varied appetite    Signs and Symptoms (as evidenced by):   Oral intakes <75% of nutritional needs.     Interventions(treatment strategy):  Collaboration of nutrition care with other providers  Modified diet-bland/renal  Supplemental beverage-Novasource Renal  Vitamin-MVI    Nutrition Diagnosis Status:   New         Monitor and Evaluation    Food and Nutrient Intake: energy intake, food and beverage intake  Food and Nutrient Adminstration: diet order  Knowledge/Beliefs/Attitudes: food and nutrition knowledge/skill  Physical Activity and Function: nutrition-related ADLs and IADLs  Anthropometric Measurements: weight, weight change, body mass index  Biochemical Data, Medical Tests and Procedures: electrolyte and renal panel, gastrointestinal profile, glucose/endocrine profile, lipid profile  Nutrition-Focused Physical Findings: overall appearance     Malnutrition Assessment                 Orbital Region (Subcutaneous Fat Loss): well nourished  Upper Arm Region (Subcutaneous Fat Loss): well nourished   Temple  Region (Muscle Loss): mild depletion  Clavicle Bone Region (Muscle Loss): mild depletion  Clavicle and Acromion Bone Region (Muscle Loss): mild depletion  Dorsal Hand (Muscle Loss): well nourished  Anterior Thigh Region (Muscle Loss): well nourished  Posterior Calf Region (Muscle Loss): well nourished   Edema (Fluid Accumulation): 3-->moderate(abdomen)   Subcutaneous Fat Loss (Final Summary): well nourished  Muscle Loss Evaluation (Final Summary): mild protein-calorie malnutrition  Fluid Accumulation Evaluation: moderate         Nutrition Follow-Up    RD Follow-up?: Yes

## 2019-12-07 NOTE — ASSESSMENT & PLAN NOTE
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     Check C Diff  Repeat stool ova and parasite  Will check additional stool studies  C/w flagyl & cipro  F/u CMV PCR  Recommend sampling of fluid collection vs ascites & would send for cell count w/ diff, as well as cultures (aerobic, anaerobic, fungal, AFB)

## 2019-12-07 NOTE — CONSULTS
[facility]  Hepatology  Consult Note    Patient Name: Jhonny Diana  MRN: [id number]  Admission Date: 12/6/2019  Hospital Length of Stay: 1 days  Attending Provider: Armand Carrasco MD   Primary Care Physician: Primary Doctor No  Principal Problem:Enteritis of infectious origin    Inpatient consult to Hepatology  Consult performed by: William Kumar MD  Consult ordered by: Armand Carrasco MD        Subjective:     Transplant status: Post-transplant    HPI:  Mr Diana is a 29 year old man with history of ETOH Hep s/p OLT 10/19/17, with post-transplant period notable for LAURA previously on HD, anastomotic stricture, ETOH relapse (6/2019), who is presenting to the hospital due n/v/d.    He was recently admitted (11/22) due to concern for acute on chronic abdominal pain, fever, and imaging at OSH concerning for enteritis. He was hospitalized at OSH with concern for enteritis, with antibiotics and subsequently presented to [facility] for further workup. While admitted, stool O&P was notable for giardia with improvement in his symptoms with antibiotics and was discharged.    He notes he was initially doing well after leaving the hospital but developed nausea, vomiting and diarrhea and inability to tolerate PO and medications therefore presented to the hospital. Was unable to tolerate his flagyl but was able to take his sirolimus. Denies f/c/s.  Endorses diffuse abdominal pain (predominantly RUQ) which is slightly worse than baseline. CT scan was concerning for worsening abdominal fluid (OSH, pending upload).      Review of Systems   Constitutional: Negative for activity change, appetite change, chills, diaphoresis, fatigue, fever and unexpected weight change.   HENT: Negative for sore throat and trouble swallowing.    Eyes: Negative for visual disturbance.   Respiratory: Negative for chest tightness and shortness of breath.    Cardiovascular: Negative for chest pain and leg swelling.   Gastrointestinal: Positive  for abdominal pain, diarrhea, nausea and vomiting. Negative for abdominal distention, anal bleeding, blood in stool and constipation.   Genitourinary: Negative for dysuria and hematuria.   Musculoskeletal: Negative for arthralgias and myalgias.   Skin: Negative for rash.   Neurological: Negative for dizziness, weakness, light-headedness and headaches.   Psychiatric/Behavioral: Negative for agitation and confusion.       Past Medical History:   Diagnosis Date    Abdominal pain 2018    Alcoholic cirrhosis 2018    Alcoholic hepatitis with ascites     Anemia     CKD (chronic kidney disease) stage 3, GFR 30-59 ml/min 2018    Encounter for blood transfusion     multiple blood transfusions    ESRD on dialysis     History of hematemesis 2017    Hypertension     Seizures     Substance abuse     alcohol and opioids    Thrombocytopenia     Transplanted liver     10/2017       Past Surgical History:   Procedure Laterality Date    APPENDECTOMY      CENTRAL VENOUS CATHETER TUNNELED INSERTION DOUBLE LUMEN      inserted and removed    ERCP      ERCP N/A 2018    Procedure: ERCP;  Surgeon: Solis Villanueva MD;  Location: [facility] ([address] [place] [address]);  Service: Endoscopy;  Laterality: N/A;  dialysis/cirrhosis/labs prior to ERCP/svn  platelet    ESOPHAGOGASTRODUODENOSCOPY      ESOPHAGOGASTRODUODENOSCOPY N/A 2019    Procedure: EGD (ESOPHAGOGASTRODUODENOSCOPY);  Surgeon: Sheri Siddiqui MD;  Location: [address]);  Service: Endoscopy;  Laterality: N/A;    LIVER TRANSPLANT      10/2017       Family history of liver disease: No    Review of patient's allergies indicates:   Allergen Reactions    Bactrim [sulfamethoxazole-trimethoprim] Other (See Comments)     Mookie Trell Syndrome       Tobacco Use    Smoking status: Former Smoker     Packs/day: 1.00     Years: 10.00     Pack years: 10.00     Types: Cigarettes     Last attempt to quit: 2017     Years since quittin.7    Smokeless  tobacco: Never Used   Substance and Sexual Activity    Alcohol use: No     Comment: a fifth of liquor daily for years, cut back over last 2 months    Drug use: No    Sexual activity: Not on file     Comment: single       Medications Prior to Admission   Medication Sig Dispense Refill Last Dose    acetaminophen (TYLENOL ORAL) Take 1 tablet by mouth daily as needed (for fever).       bisacodyl (DULCOLAX) 5 mg EC tablet Take 5 mg by mouth daily as needed for Constipation.       ergocalciferol (ERGOCALCIFEROL) 50,000 unit Cap Take 1 capsule (50,000 Units total) by mouth every 7 days. 12 capsule 0     [] metroNIDAZOLE (FLAGYL) 500 MG tablet Take 1 tablet (500 mg total) by mouth every 8 (eight) hours. for 6 days 18 tablet 0     promethazine (PHENERGAN) 25 MG tablet Take 1 tablet (25 mg total) by mouth every 6 (six) hours as needed for Nausea. 30 tablet 0     sirolimus 0.5 mg Tab Take 1 tablet (0.5 mg total) by mouth once daily. 30 tablet 3        Objective:     Vital Signs (Most Recent):  Temp: 98.7 °F (37.1 °C) (19)  Pulse: 98 (19)  Resp: 18 (19)  BP: (!) 142/80 (19)  SpO2: 100 % (19) Vital Signs (24h Range):  Temp:  [96.9 °F (36.1 °C)-99.2 °F (37.3 °C)] 98.7 °F (37.1 °C)  Pulse:  [] 98  Resp:  [17-18] 18  SpO2:  [97 %-100 %] 100 %  BP: (130-152)/(78-91) 142/80     Weight: 65.1 kg (143 lb 8.3 oz) (19)  Body mass index is 23.16 kg/m².    Physical Exam   Constitutional: He is oriented to person, place, and time. No distress.   Comfortable appearing, sitting in chair, on computer. Family at bedside.   HENT:   Head: Normocephalic and atraumatic.   Mouth/Throat: Oropharynx is clear and moist. No oropharyngeal exudate.   Eyes: Conjunctivae are normal. No scleral icterus.   Neck: No JVD present.   Cardiovascular: Normal rate, regular rhythm, normal heart sounds and intact distal pulses.   Pulmonary/Chest: Effort normal and breath sounds  normal. No respiratory distress.   Abdominal: Soft. Bowel sounds are normal. He exhibits no distension and no mass. There is no tenderness. There is no rebound and no guarding.   Soft, mild tenderness diffusely. Non-distended. Soft, reducible umbilical hernia.   Musculoskeletal: He exhibits no edema or tenderness.   Bony prominence along inferior right sternum at costochondral junction. Non-fluctuant. Non-tender.    Lymphadenopathy:     He has no cervical adenopathy.   Neurological: He is alert and oriented to person, place, and time.   Skin: Skin is warm. Capillary refill takes less than 2 seconds. He is not diaphoretic.   Psychiatric: He has a normal mood and affect. His behavior is normal. Judgment and thought content normal.   Nursing note and vitals reviewed.      MELD-Na score: 12 at 12/7/2019  6:56 AM  MELD score: 12 at 12/7/2019  6:56 AM  Calculated from:  Serum Creatinine: 1.7 mg/dL at 12/7/2019  6:56 AM  Serum Sodium: 138 mmol/L (Rounded to 137 mmol/L) at 12/7/2019  6:56 AM  Total Bilirubin: 0.2 mg/dL (Rounded to 1 mg/dL) at 12/7/2019  6:56 AM  INR(ratio): 1.0 at 12/6/2019  5:39 PM  Age: 29 years    Significant Labs:  Labs within the past month have been reviewed.    Significant Imaging:  Labs: Reviewed    Assessment/Plan:     S/P liver transplant  Mr Diana is a 29 year old man with history of ETOH Hep s/p OLT 10/19/17, with post-transplant period notable for LAURA previously on HD, anastomotic stricture, ETOH relapse (6/2019), who is presenting to the hospital due n/v/d.    Recently admitted with abdominal pain, imaging ?enteritis with workup +ve giardia with improvement on treatment, but subsequent n/v/d and inability to tolerate PO. Imaging with ?increasing abdominal fluid collection (pending upload).    Plan  - sirolimus: continue 0.5mg daily, LFTs stable. Level <2 on admission, may be due to poor PO tolerance.  - Giardia. Recent giardia diagnosis (11/28), unable to tolerate outpatient treatment. Will  resume flagyl  - diarrhea. Reports improved since admission. Will r/o c.diff given recent hospitalization and antibiotics  - LAURA. Had LAURA during recent admission which was improving, returning with Crt 1.6 -> 2.0, likely due to pre-renal. Improving with hydration  - fluid collection. Will upload images from OSH for review. If increasing will ask IR to sample fluid to further characterize it. On antibiotics.          Thank you for your consult. I will follow-up with patient. Please contact us if you have any additional questions.    William Kumar MD  Hepatology  [facility]

## 2019-12-08 LAB
ABO + RH BLD: NORMAL
ALBUMIN SERPL BCP-MCNC: 2.3 G/DL (ref 3.5–5.2)
ALP SERPL-CCNC: 114 U/L (ref 55–135)
ALT SERPL W/O P-5'-P-CCNC: 7 U/L (ref 10–44)
ANION GAP SERPL CALC-SCNC: 7 MMOL/L (ref 8–16)
AST SERPL-CCNC: 21 U/L (ref 10–40)
BASOPHILS # BLD AUTO: 0.03 K/UL (ref 0–0.2)
BASOPHILS NFR BLD: 0.8 % (ref 0–1.9)
BILIRUB SERPL-MCNC: 0.2 MG/DL (ref 0.1–1)
BLD GP AB SCN CELLS X3 SERPL QL: NORMAL
BLD PROD TYP BPU: NORMAL
BLOOD UNIT EXPIRATION DATE: NORMAL
BLOOD UNIT TYPE CODE: 5100
BLOOD UNIT TYPE: NORMAL
BUN SERPL-MCNC: 11 MG/DL (ref 6–20)
CALCIUM SERPL-MCNC: 7.7 MG/DL (ref 8.7–10.5)
CHLORIDE SERPL-SCNC: 112 MMOL/L (ref 95–110)
CO2 SERPL-SCNC: 20 MMOL/L (ref 23–29)
CODING SYSTEM: NORMAL
CREAT SERPL-MCNC: 1.7 MG/DL (ref 0.5–1.4)
DIFFERENTIAL METHOD: ABNORMAL
DISPENSE STATUS: NORMAL
EOSINOPHIL # BLD AUTO: 0.1 K/UL (ref 0–0.5)
EOSINOPHIL NFR BLD: 3.4 % (ref 0–8)
ERYTHROCYTE [DISTWIDTH] IN BLOOD BY AUTOMATED COUNT: 16 % (ref 11.5–14.5)
EST. GFR  (AFRICAN AMERICAN): >60 ML/MIN/1.73 M^2
EST. GFR  (NON AFRICAN AMERICAN): 52.9 ML/MIN/1.73 M^2
GLUCOSE SERPL-MCNC: 84 MG/DL (ref 70–110)
HCT VFR BLD AUTO: 21.8 % (ref 40–54)
HGB BLD-MCNC: 6.5 G/DL (ref 14–18)
IMM GRANULOCYTES # BLD AUTO: 0.02 K/UL (ref 0–0.04)
IMM GRANULOCYTES NFR BLD AUTO: 0.5 % (ref 0–0.5)
LYMPHOCYTES # BLD AUTO: 1.1 K/UL (ref 1–4.8)
LYMPHOCYTES NFR BLD: 29.5 % (ref 18–48)
MAGNESIUM SERPL-MCNC: 1.6 MG/DL (ref 1.6–2.6)
MCH RBC QN AUTO: 26.4 PG (ref 27–31)
MCHC RBC AUTO-ENTMCNC: 29.8 G/DL (ref 32–36)
MCV RBC AUTO: 89 FL (ref 82–98)
MONOCYTES # BLD AUTO: 0.4 K/UL (ref 0.3–1)
MONOCYTES NFR BLD: 10.9 % (ref 4–15)
NEUTROPHILS # BLD AUTO: 2.1 K/UL (ref 1.8–7.7)
NEUTROPHILS NFR BLD: 54.9 % (ref 38–73)
NRBC BLD-RTO: 0 /100 WBC
NUM UNITS TRANS PACKED RBC: NORMAL
PHOSPHATE SERPL-MCNC: 4.1 MG/DL (ref 2.7–4.5)
PLATELET # BLD AUTO: 160 K/UL (ref 150–350)
PMV BLD AUTO: 9.5 FL (ref 9.2–12.9)
POTASSIUM SERPL-SCNC: 4 MMOL/L (ref 3.5–5.1)
PROT SERPL-MCNC: 6.6 G/DL (ref 6–8.4)
RBC # BLD AUTO: 2.46 M/UL (ref 4.6–6.2)
SODIUM SERPL-SCNC: 139 MMOL/L (ref 136–145)
WBC # BLD AUTO: 3.87 K/UL (ref 3.9–12.7)

## 2019-12-08 PROCEDURE — 86901 BLOOD TYPING SEROLOGIC RH(D): CPT

## 2019-12-08 PROCEDURE — 63600175 PHARM REV CODE 636 W HCPCS: Performed by: HOSPITALIST

## 2019-12-08 PROCEDURE — 20600001 HC STEP DOWN PRIVATE ROOM

## 2019-12-08 PROCEDURE — 36430 TRANSFUSION BLD/BLD COMPNT: CPT

## 2019-12-08 PROCEDURE — 99233 SBSQ HOSP IP/OBS HIGH 50: CPT | Mod: ,,, | Performed by: INTERNAL MEDICINE

## 2019-12-08 PROCEDURE — 80053 COMPREHEN METABOLIC PANEL: CPT

## 2019-12-08 PROCEDURE — 25000003 PHARM REV CODE 250: Performed by: HOSPITALIST

## 2019-12-08 PROCEDURE — 99233 PR SUBSEQUENT HOSPITAL CARE,LEVL III: ICD-10-PCS | Mod: ,,, | Performed by: HOSPITALIST

## 2019-12-08 PROCEDURE — P9016 RBC LEUKOCYTES REDUCED: HCPCS

## 2019-12-08 PROCEDURE — 99233 SBSQ HOSP IP/OBS HIGH 50: CPT | Mod: ,,, | Performed by: HOSPITALIST

## 2019-12-08 PROCEDURE — 36415 COLL VENOUS BLD VENIPUNCTURE: CPT

## 2019-12-08 PROCEDURE — 63600175 PHARM REV CODE 636 W HCPCS: Performed by: INTERNAL MEDICINE

## 2019-12-08 PROCEDURE — S0030 INJECTION, METRONIDAZOLE: HCPCS | Performed by: HOSPITALIST

## 2019-12-08 PROCEDURE — 85025 COMPLETE CBC W/AUTO DIFF WBC: CPT

## 2019-12-08 PROCEDURE — 86920 COMPATIBILITY TEST SPIN: CPT

## 2019-12-08 PROCEDURE — 83735 ASSAY OF MAGNESIUM: CPT

## 2019-12-08 PROCEDURE — 99233 PR SUBSEQUENT HOSPITAL CARE,LEVL III: ICD-10-PCS | Mod: ,,, | Performed by: INTERNAL MEDICINE

## 2019-12-08 PROCEDURE — 84100 ASSAY OF PHOSPHORUS: CPT

## 2019-12-08 RX ORDER — SIROLIMUS 0.5 MG/1
0.5 TABLET, FILM COATED ORAL ONCE
Status: COMPLETED | OUTPATIENT
Start: 2019-12-08 | End: 2019-12-08

## 2019-12-08 RX ORDER — HYDROCODONE BITARTRATE AND ACETAMINOPHEN 500; 5 MG/1; MG/1
TABLET ORAL
Status: DISCONTINUED | OUTPATIENT
Start: 2019-12-08 | End: 2019-12-09

## 2019-12-08 RX ORDER — POLYETHYLENE GLYCOL 3350 17 G/17G
17 POWDER, FOR SOLUTION ORAL 2 TIMES DAILY PRN
Status: DISCONTINUED | OUTPATIENT
Start: 2019-12-08 | End: 2019-12-11 | Stop reason: HOSPADM

## 2019-12-08 RX ORDER — SODIUM CHLORIDE 0.9 % (FLUSH) 0.9 %
10 SYRINGE (ML) INJECTION
Status: DISCONTINUED | OUTPATIENT
Start: 2019-12-08 | End: 2019-12-09

## 2019-12-08 RX ORDER — SIROLIMUS 1 MG/1
1 TABLET, FILM COATED ORAL DAILY
Status: DISCONTINUED | OUTPATIENT
Start: 2019-12-09 | End: 2019-12-11

## 2019-12-08 RX ADMIN — HYDROMORPHONE HYDROCHLORIDE 1 MG: 1 INJECTION, SOLUTION INTRAMUSCULAR; INTRAVENOUS; SUBCUTANEOUS at 08:12

## 2019-12-08 RX ADMIN — SIROLIMUS 0.5 MG: 0.5 TABLET, SUGAR COATED ORAL at 08:12

## 2019-12-08 RX ADMIN — HYDROMORPHONE HYDROCHLORIDE 1 MG: 1 INJECTION, SOLUTION INTRAMUSCULAR; INTRAVENOUS; SUBCUTANEOUS at 12:12

## 2019-12-08 RX ADMIN — DICYCLOMINE HYDROCHLORIDE 10 MG: 10 CAPSULE ORAL at 05:12

## 2019-12-08 RX ADMIN — OXYCODONE HYDROCHLORIDE 15 MG: 10 TABLET ORAL at 05:12

## 2019-12-08 RX ADMIN — PROMETHAZINE HYDROCHLORIDE 25 MG: 25 INJECTION INTRAMUSCULAR; INTRAVENOUS at 12:12

## 2019-12-08 RX ADMIN — DICYCLOMINE HYDROCHLORIDE 10 MG: 10 CAPSULE ORAL at 12:12

## 2019-12-08 RX ADMIN — OXYCODONE HYDROCHLORIDE 15 MG: 10 TABLET ORAL at 08:12

## 2019-12-08 RX ADMIN — DICYCLOMINE HYDROCHLORIDE 10 MG: 10 CAPSULE ORAL at 08:12

## 2019-12-08 RX ADMIN — PANTOPRAZOLE SODIUM 40 MG: 40 TABLET, DELAYED RELEASE ORAL at 08:12

## 2019-12-08 RX ADMIN — METRONIDAZOLE 500 MG: 500 INJECTION, SOLUTION INTRAVENOUS at 02:12

## 2019-12-08 RX ADMIN — CIPROFLOXACIN 400 MG: 2 INJECTION, SOLUTION INTRAVENOUS at 08:12

## 2019-12-08 RX ADMIN — METRONIDAZOLE 500 MG: 500 INJECTION, SOLUTION INTRAVENOUS at 10:12

## 2019-12-08 RX ADMIN — POLYETHYLENE GLYCOL 3350 17 G: 17 POWDER, FOR SOLUTION ORAL at 09:12

## 2019-12-08 RX ADMIN — OXYCODONE HYDROCHLORIDE 15 MG: 10 TABLET ORAL at 02:12

## 2019-12-08 RX ADMIN — METRONIDAZOLE 500 MG: 500 INJECTION, SOLUTION INTRAVENOUS at 05:12

## 2019-12-08 RX ADMIN — SIROLIMUS 0.5 MG: 0.5 TABLET, SUGAR COATED ORAL at 12:12

## 2019-12-08 NOTE — SUBJECTIVE & OBJECTIVE
Interval History:   - feels well today, denies any new complaints  - abd pain improved  - no BM , c.diff canceled  - afeb, no leucocytosis  - utox +ve amphetamine. Denies any knowledge of where this could have come from. Says he does not use any drugs, no OTC with sudafed (though does endorse using cold and sinus meds is careful to review chart), no obvious offending medications given  - Crt stable, LFts stable    Current Facility-Administered Medications   Medication    0.9%  NaCl infusion (for blood administration)    acetaminophen tablet 650 mg    ciprofloxacin (CIPRO)400mg/200ml D5W IVPB 400 mg    dextrose 10% (D10W) Bolus    dextrose 10% (D10W) Bolus    dicyclomine capsule 10 mg    ergocalciferol capsule 50,000 Units    glucagon (human recombinant) injection 1 mg    glucose chewable tablet 16 g    glucose chewable tablet 24 g    HYDROmorphone injection 1 mg    metronidazole IVPB 500 mg    ondansetron injection 8 mg    oxyCODONE immediate release tablet 15 mg    pantoprazole EC tablet 40 mg    promethazine (PHENERGAN) 25 mg in dextrose 5 % 50 mL IVPB    sirolimus tablet 0.5 mg    [START ON 12/9/2019] sirolimus tablet 1 mg    sodium chloride 0.9% flush 10 mL    sodium chloride 0.9% flush 10 mL       Objective:     Vital Signs (Most Recent):  Temp: 98.7 °F (37.1 °C) (12/08/19 1149)  Pulse: 93 (12/08/19 1149)  Resp: 18 (12/08/19 1149)  BP: 127/70 (12/08/19 1149)  SpO2: 100 % (12/08/19 1149) Vital Signs (24h Range):  Temp:  [96.9 °F (36.1 °C)-98.7 °F (37.1 °C)] 98.7 °F (37.1 °C)  Pulse:  [76-93] 93  Resp:  [16-18] 18  SpO2:  [98 %-100 %] 100 %  BP: (124-141)/(67-82) 127/70     Weight: 65.1 kg (143 lb 8.3 oz) (12/06/19 1951)  Body mass index is 23.16 kg/m².    Physical Exam   Constitutional: He is oriented to person, place, and time. No distress.   Comfortable appearing, sitting in chair, fam at bedside.   HENT:   Mouth/Throat: Oropharynx is clear and moist.   Eyes: No scleral icterus.    Pulmonary/Chest: Effort normal. No respiratory distress.   Abdominal: Soft. He exhibits no distension. There is tenderness.   Soft, mild tenderness. Umbilical hernia.   Musculoskeletal: He exhibits no edema or tenderness.   Neurological: He is alert and oriented to person, place, and time.   Skin: Skin is warm. Capillary refill takes less than 2 seconds. He is not diaphoretic.   Psychiatric: He has a normal mood and affect.   Nursing note and vitals reviewed.      MELD-Na score: 12 at 12/8/2019  4:23 AM  MELD score: 12 at 12/8/2019  4:23 AM  Calculated from:  Serum Creatinine: 1.7 mg/dL at 12/8/2019  4:23 AM  Serum Sodium: 139 mmol/L (Rounded to 137 mmol/L) at 12/8/2019  4:23 AM  Total Bilirubin: 0.2 mg/dL (Rounded to 1 mg/dL) at 12/8/2019  4:23 AM  INR(ratio): 1.0 at 12/6/2019  5:39 PM  Age: 29 years    Significant Labs:  Labs within the past month have been reviewed.    Significant Imaging:  Labs: Reviewed

## 2019-12-08 NOTE — NURSING
Attempted to obtain second IV access x2 and unsuccessful. Will start PRBCs as soon as Flagyl is complete.

## 2019-12-08 NOTE — PROGRESS NOTES
Progress Note   Hospital Medicine         Patient Name: Jhonny Diana  MRN:  [id number]  [facility] Medicine Team: [facility] Armand Carrasco MD  Date of Admission:  12/6/2019     Length of Stay:  LOS: 2 days   Expected Discharge Date: 12/17/2019  Principal Problem:  Enteritis of infectious origin       Subjective:     Interval History/Overnight Events:  Patient doing ok today, still having some nausea, pain is controlled with narcotics; has not had a BM, so will cancel C. Diff check; patient's hemoglobin dropped to 6.5, no signs of overt GI bleeding and does not seem to have a retroperitoneal bleed, likely dilutional and CKD related, will transfuse 1 unit of PRBC; plan for IR aspiration of the fluid collection tomorrow and send for analysis; also will review abdominal imaging with IR and upload to epic  - patient does have positive amphetamine on his urine tox and his  does not show being prescribed any substances that would cause that; patient denies taking any medications that would cause that as well    Review of Systems   Constitutional: Negative for chills, fatigue, fever.   HENT: Negative for sore throat, trouble swallowing.    Eyes: Negative for photophobia, visual disturbance.   Respiratory: Negative for cough, shortness of breath.    Cardiovascular: Negative for chest pain, palpitations, leg swelling.   Gastrointestinal: positive for abdominal pain, denies constipation, positive diarrhea, nausea, denies vomiting.   Endocrine: Negative for cold intolerance, heat intolerance.   Genitourinary: Negative for dysuria, frequency.   Musculoskeletal: Negative for arthralgias, myalgias.   Skin: Negative for rash, wound, erythema   Neurological: Negative for dizziness, syncope, weakness, light-headedness.   Psychiatric/Behavioral: Negative for confusion, hallucinations, anxiety  All other systems reviewed and are negative.    Objective:     Temp:  [96.9 °F (36.1 °C)-98.2 °F (36.8 °C)]   Pulse:  [76-93]   Resp:   [16-18]   BP: (124-141)/(67-82)   SpO2:  [98 %-100 %]       Physical Exam:  Constitutional: appears weak and frail   Head: Normocephalic and atraumatic.   Mouth/Throat: Oropharynx is clear and moist.   Eyes: EOM are normal. Pupils are equal, round, and reactive to light. No scleral icterus.   Neck: Normal range of motion. Neck supple.   Cardiovascular: Normal rate and regular rhythm.  No murmur heard.  Pulmonary/Chest: Effort normal and breath sounds normal. No respiratory distress. No wheezes, rales, or rhonchi  Abdominal: Soft. Bowel sounds are normal.  positive distension and RUQ tenderness  Musculoskeletal: Normal range of motion. No edema.   Neurological: Alert and oriented to person, place, and time.   Skin: Skin is warm and dry.   Psychiatric: Normal mood and affect. Behavior is normal.     Recent Labs   Lab 12/06/19 1739 12/07/19  0655 12/08/19  0423   WBC 7.00 5.66 3.87*   HGB 8.6* 7.9* 6.5*   HCT 27.5* 25.0* 21.8*    209 160     Recent Labs   Lab 12/06/19 1739 12/07/19  0656 12/08/19  0423    138 139   K 4.4 3.8 4.0    107 112*   CO2 20* 21* 20*   BUN 15 14 11   CREATININE 2.0* 1.7* 1.7*   GLU 65* 115* 84   CALCIUM 9.2 8.1* 7.7*   MG  --  1.6 1.6   PHOS  --  3.8 4.1     Recent Labs   Lab 12/06/19  1739 12/07/19  0656 12/08/19  0423   ALKPHOS 142* 125 114   ALT 8* 7* 7*   AST 22 19 21   ALBUMIN 2.7* 2.4* 2.3*   PROT 8.0 7.1 6.6   BILITOT 0.4 0.2 0.2   INR 1.0  --   --      No results for input(s): POCTGLUCOSE in the last 168 hours.     ciprofloxacin  400 mg Intravenous Q12H    dicyclomine  10 mg Oral QID    ergocalciferol  50,000 Units Oral Q7 Days    metronidazole  500 mg Intravenous Q8H    pantoprazole  40 mg Oral Daily    sirolimus  0.5 mg Oral Once    [START ON 12/9/2019] sirolimus  1 mg Oral Daily       Assessment and Plan     Mr. Jhonny Diana is a 29 y.o. male who presented to [facility] on 12/6/2019 with     Hospital Course:    Mr. Jhonny Diana was admitted to [facility]  for management of     Active Hospital Problems    Diagnosis  POA    *Enteritis of infectious origin [A09]  Yes    Diarrhea [R19.7]  Yes    Gastroenteritis [K52.9]  Yes    LAURA (acute kidney injury) [N17.9]  Yes    Anemia of chronic disease [D63.8]  Yes    Moderate protein malnutrition [E44.0]  Yes    Intractable nausea and vomiting [R11.2]  Yes    S/P liver transplant [Z94.4]  Not Applicable     Chronic    Prophylactic immunotherapy [Z29.8]  Not Applicable     Chronic    Long-term use of immunosuppressant medication [Z79.899]  Not Applicable     Chronic    Metabolic acidosis [E87.2]  Yes      Resolved Hospital Problems    Diagnosis Date Resolved POA    Ascites [R18.8] 05/21/2018 Yes     Added automatically from request for surgery [id number]           # Infectious enteritis   # Giardia infection  # Intractable N/V  # Diarrhea   # Fluid collection   - patient recently diagnosed with giardia and discharged with PO flagly  - states when he got home his symptoms came back, N/V and worsening abdominal pain and diarrhea   - CT ab/pelv at OSH shows worsening fluid collection, image has been sent over and will need to placed in epic; unlikely ascites as patient does not have evidence of cirrhosis in his transplant; patient has had ex-laps which showed brown/green fluid, possible bile leak? Will need to review image with radiology and hepatology  - Ab X ray reviewed   -  on IV Flagyl and Cipro  - checking CMV   - transplant ID consulted, appreciate recs  - will plan on doing IR fluid aspiration on Monday and send for cultures and cytology  - will also review imaging with IR     # H/O Liver transplant   # Immunosuppressed status   - hepatology consulted  - cont rapamune  - check levels      # LAURA on CKD stage 3  # Metabolic acidosis   - Cr up to 2, baseline of 1.5  - likely due to dehydration  - 1L NS bolus, then start on 100 cc/hr  - urine studies and renal U/S  - strict I/O  - improved to 1.7 today     # Microcytic  Anemia  - checking iron studies    - transfusing 1 unit of PRBC on 12/8     # Moderate protein jalen malnutrition  - PAB, ensure, dietary     Diet:  bland  GI PPx:    DVT PPx:    Goals of Care:  full        Disposition:  Early next week       Armand Carrasco MD  Medical Director [facility]  Spectra:  [phone]  Pager: [phone]

## 2019-12-08 NOTE — PROGRESS NOTES
[facility]  Hepatology  Progress Note    Patient Name: Jhonny Diana  MRN: [id number]  Admission Date: 12/6/2019  Hospital Length of Stay: 2 days  Attending Provider: Armand Carrasco MD   Primary Care Physician: Primary Doctor No  Principal Problem:Enteritis of infectious origin    Subjective:     Transplant status: Post-transplant    HPI: Mr Diana is a 29 year old man with history of ETOH Hep s/p OLT 10/19/17, with post-transplant period notable for LAURA previously on HD, anastomotic stricture, ETOH relapse (6/2019), who is presenting to the hospital due n/v/d.    He was recently admitted (11/22) due to concern for acute on chronic abdominal pain, fever, and imaging at OSH concerning for enteritis. He was hospitalized at OSH with concern for enteritis, with antibiotics and [place] [facility] [place] While admitted, stool O&P was notable for giardia with improvement in his symptoms with antibiotics and was discharged.    He notes he was initially doing well after leaving the hospital but developed nausea, vomiting and diarrhea and inability to tolerate PO and medications therefore presented to the hospital. Was unable to tolerate his flagyl but was able to take his sirolimus. Denies f/c/s.  Endorses diffuse abdominal pain (predominantly RUQ) which is slightly worse than baseline. CT scan was concerning for worsening abdominal fluid (OSH, pending upload).      Interval History:   - feels well today, denies any new complaints  - abd pain improved  - no BM , c.diff canceled  - afeb, no leucocytosis  - utox +ve amphetamine. Denies any knowledge of where this could have come from. Says he does not use any drugs, no OTC with sudafed (though does endorse using cold and sinus meds is careful to review chart), no obvious offending medications given  - Crt stable, LFts stable    Current Facility-Administered Medications   Medication    0.9%  NaCl infusion (for blood administration)     acetaminophen tablet 650 mg    ciprofloxacin (CIPRO)400mg/200ml D5W IVPB 400 mg    dextrose 10% (D10W) Bolus    dextrose 10% (D10W) Bolus    dicyclomine capsule 10 mg    ergocalciferol capsule 50,000 Units    glucagon (human recombinant) injection 1 mg    glucose chewable tablet 16 g    glucose chewable tablet 24 g    HYDROmorphone injection 1 mg    metronidazole IVPB 500 mg    ondansetron injection 8 mg    oxyCODONE immediate release tablet 15 mg    pantoprazole EC tablet 40 mg    promethazine (PHENERGAN) 25 mg in dextrose 5 % 50 mL IVPB    sirolimus tablet 0.5 mg    [START ON 12/9/2019] sirolimus tablet 1 mg    sodium chloride 0.9% flush 10 mL    sodium chloride 0.9% flush 10 mL       Objective:     Vital Signs (Most Recent):  Temp: 98.7 °F (37.1 °C) (12/08/19 1149)  Pulse: 93 (12/08/19 1149)  Resp: 18 (12/08/19 1149)  BP: 127/70 (12/08/19 1149)  SpO2: 100 % (12/08/19 1149) Vital Signs (24h Range):  Temp:  [96.9 °F (36.1 °C)-98.7 °F (37.1 °C)] 98.7 °F (37.1 °C)  Pulse:  [76-93] 93  Resp:  [16-18] 18  SpO2:  [98 %-100 %] 100 %  BP: (124-141)/(67-82) 127/70     Weight: 65.1 kg (143 lb 8.3 oz) (12/06/19 1951)  Body mass index is 23.16 kg/m².    Physical Exam   Constitutional: He is oriented to person, place, and time. No distress.   Comfortable appearing, sitting in chair, fam at bedside.   HENT:   Mouth/Throat: Oropharynx is clear and moist.   Eyes: No scleral icterus.   Pulmonary/Chest: Effort normal. No respiratory distress.   Abdominal: Soft. He exhibits no distension. There is tenderness.   Soft, mild tenderness. Umbilical hernia.   Musculoskeletal: He exhibits no edema or tenderness.   Neurological: He is alert and oriented to person, place, and time.   Skin: Skin is warm. Capillary refill takes less than 2 seconds. He is not diaphoretic.   Psychiatric: He has a normal mood and affect.   Nursing note and vitals reviewed.      MELD-Na score: 12 at 12/8/2019  4:23 AM  MELD score: 12 at 12/8/2019   4:23 AM  Calculated from:  Serum Creatinine: 1.7 mg/dL at 12/8/2019  4:23 AM  Serum Sodium: 139 mmol/L (Rounded to 137 mmol/L) at 12/8/2019  4:23 AM  Total Bilirubin: 0.2 mg/dL (Rounded to 1 mg/dL) at 12/8/2019  4:23 AM  INR(ratio): 1.0 at 12/6/2019  5:39 PM  Age: 29 years    Significant Labs:  Labs within the past month have been reviewed.    Significant Imaging:  Labs: Reviewed    Assessment/Plan:     S/P liver transplant  Mr Diana is a 29 year old man with history of ETOH Hep s/p OLT 10/19/17, with post-transplant period notable for LAURA previously on HD, anastomotic stricture, ETOH relapse (6/2019), who is presenting to the hospital due n/v/d.    Recently admitted with abdominal pain, imaging ?enteritis with workup +ve giardia with improvement on treatment, but subsequent n/v/d and inability to tolerate PO. Imaging with ?increasing abdominal fluid collection (pending upload). Small volume elton-hepatic on RUQ.    Plan  - sirolimus: increase to 1mg daily due to low level  - Giardia. Recent giardia diagnosis (11/28), unable to tolerate outpatient treatment. Will resume flagyl  - diarrhea. Reports improved since admission. Stool studies pending.  - LAURA. Had LAURA during recent admission which was improving, returning with Crt 1.6 -> 2.0, likely due to pre-renal. Stable. Avoid nephrotoxins.  - fluid collection. Will upload images from OSH for review. If increasing will ask IR to sample fluid to further characterize it. On antibiotics.           Thank you for your consult. I will follow-up with patient. Please contact us if you have any additional questions.    William Kumar MD  Hepatology  [facility]

## 2019-12-08 NOTE — ASSESSMENT & PLAN NOTE
Mr Diana is a 29 year old man with history of ETOH Hep s/p OLT 10/19/17, with post-transplant period notable for LAURA previously on HD, anastomotic stricture, ETOH relapse (6/2019), who is presenting to the hospital due n/v/d.    Recently admitted with abdominal pain, imaging ?enteritis with workup +ve giardia with improvement on treatment, but subsequent n/v/d and inability to tolerate PO. Imaging with ?increasing abdominal fluid collection (pending upload). Small volume elton-hepatic on RUQ.    Plan  - sirolimus: increase to 1mg daily due to low level  - Giardia. Recent giardia diagnosis (11/28), unable to tolerate outpatient treatment. Will resume flagyl  - diarrhea. Reports improved since admission. Stool studies pending.  - LAURA. Had LAURA during recent admission which was improving, returning with Crt 1.6 -> 2.0, likely due to pre-renal. Stable. Avoid nephrotoxins.  - fluid collection. Will upload images from OSH for review. If increasing will ask IR to sample fluid to further characterize it. On antibiotics.

## 2019-12-08 NOTE — ASSESSMENT & PLAN NOTE
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       Repeat stool ova and parasite  C/w flagyl & cipro  F/u CMV PCR  Recommend sampling of fluid collection vs ascites & would send for cell count w/ diff, as well as cultures (aerobic, anaerobic, fungal, AFB)

## 2019-12-08 NOTE — PLAN OF CARE
Pt AAOx4, independent, VSS. Pt receiving IV antibiotics and 1 unit PRBC today. Pain controlled with PRN Oxy and Dilaudid for breakthrough pain. Will continue to monitor.

## 2019-12-08 NOTE — PLAN OF CARE
No acute changes overnight. VSS. Hourly rounding performed. Safety maintained. Pt remained free of falls. Special contact precautions maintained. NS infusing @ 100 cc/hr. PRN pain meds given per order. Pt resting. Family at bedside. No needs at this time. Call bell in reach. Will continue to monitor.

## 2019-12-08 NOTE — SUBJECTIVE & OBJECTIVE
Interval History: n/v and abd pain much improved, diarrhea resolved    Review of Systems   Constitutional: Positive for fatigue. Negative for activity change, appetite change, chills and fever.   HENT: Negative for congestion, dental problem, mouth sores and sinus pressure.    Eyes: Negative for pain, redness and visual disturbance.   Respiratory: Negative for cough, shortness of breath and wheezing.    Cardiovascular: Negative for chest pain and leg swelling.   Gastrointestinal: Positive for abdominal pain, nausea and vomiting. Negative for abdominal distention and diarrhea.   Endocrine: Negative for polyuria.   Genitourinary: Negative for decreased urine volume, dysuria and frequency.   Musculoskeletal: Negative for joint swelling.   Skin: Negative for color change.   Allergic/Immunologic: Negative for food allergies.   Neurological: Negative for dizziness, weakness and headaches.   Hematological: Negative for adenopathy.   Psychiatric/Behavioral: Negative for agitation and confusion. The patient is not nervous/anxious.      Objective:     Vital Signs (Most Recent):  Temp: 98.4 °F (36.9 °C) (12/08/19 1214)  Pulse: 91 (12/08/19 1214)  Resp: 18 (12/08/19 1214)  BP: 136/81 (12/08/19 1214)  SpO2: 99 % (12/08/19 1214) Vital Signs (24h Range):  Temp:  [96.9 °F (36.1 °C)-98.7 °F (37.1 °C)] 98.4 °F (36.9 °C)  Pulse:  [76-93] 91  Resp:  [16-18] 18  SpO2:  [98 %-100 %] 99 %  BP: (124-141)/(67-82) 136/81     Weight: 65.1 kg (143 lb 8.3 oz)  Body mass index is 23.16 kg/m².    Estimated Creatinine Clearance: 57.3 mL/min (A) (based on SCr of 1.7 mg/dL (H)).    Physical Exam   Constitutional: He is oriented to person, place, and time. He appears well-developed and well-nourished.   HENT:   Head: Normocephalic and atraumatic.   Mouth/Throat: Oropharynx is clear and moist.   Eyes: Conjunctivae are normal.   Neck: Neck supple.   Cardiovascular: Normal rate, regular rhythm and normal heart sounds.   No murmur  heard.  Pulmonary/Chest: Effort normal and breath sounds normal. No respiratory distress. He has no wheezes.   Abdominal: Soft. Bowel sounds are normal. He exhibits no distension. There is tenderness.   Musculoskeletal: Normal range of motion. He exhibits no edema or tenderness.   Lymphadenopathy:     He has no cervical adenopathy.   Neurological: He is alert and oriented to person, place, and time. Coordination normal.   Skin: Skin is warm and dry. No rash noted.   Psychiatric: He has a normal mood and affect. His behavior is normal.       Significant Labs:   CBC:   Recent Labs   Lab 12/06/19 1739 12/07/19  0655 12/08/19  0423   WBC 7.00 5.66 3.87*   HGB 8.6* 7.9* 6.5*   HCT 27.5* 25.0* 21.8*    209 160     CMP:   Recent Labs   Lab 12/06/19 1739 12/07/19  0656 12/08/19  0423    138 139   K 4.4 3.8 4.0    107 112*   CO2 20* 21* 20*   GLU 65* 115* 84   BUN 15 14 11   CREATININE 2.0* 1.7* 1.7*   CALCIUM 9.2 8.1* 7.7*   PROT 8.0 7.1 6.6   ALBUMIN 2.7* 2.4* 2.3*   BILITOT 0.4 0.2 0.2   ALKPHOS 142* 125 114   AST 22 19 21   ALT 8* 7* 7*   ANIONGAP 14 10 7*   EGFRNONAA 43.5* 52.9* 52.9*       Significant Imaging: I have reviewed all pertinent imaging results/findings within the past 24 hours.

## 2019-12-08 NOTE — ASSESSMENT & PLAN NOTE
Mr Diana is a 29 year old man with history of ETOH Hep s/p OLT 10/19/17, with post-transplant period notable for LAURA previously on HD, anastomotic stricture, ETOH relapse (6/2019), who is presenting to the hospital due n/v/d.    Recently admitted with abdominal pain, imaging ?enteritis with workup +ve giardia with improvement on treatment, but subsequent n/v/d and inability to tolerate PO. Imaging with ?increasing abdominal fluid collection (pending upload). Small volume elton-hepatic on RUQ.    Plan  - sirolimus:  Continue 1 mg daily.  - Giardia. Recent giardia diagnosis (11/28), unable to tolerate outpatient treatment. Will resume flagyl  - diarrhea. Reports improved since admission. Stool studies pending.  - LAURA. Had LAURA during recent admission which was improving, returning with Crt 1.6 -> 2.0, likely due to pre-renal. Stable. Avoid nephrotoxins.  - fluid collection. Will upload images from OSH for review.  Will ask IR to sample.  Continue antibiotics.  Appreciate ID input.  - umbilical hernia.  Soft reducible.  Will likely require outpatient intervention as has been increasing in size over time

## 2019-12-08 NOTE — PROGRESS NOTES
[facility]  Infectious Disease  Progress Note    Patient Name: Jhonny Diana  MRN: [id number]  Admission Date: 12/6/2019  Length of Stay: 2 days  Attending Physician: Armand Carrasco MD  Primary Care Provider: Primary Doctor No    Isolation Status: Special Contact  Assessment/Plan:      S/P liver transplant  30 y/o M PMhx alcoholic cirrhosis s/p DDLT 10/19/2017 (CMV D+/R+, steroid induction, on maintenance sirolimus) c/b seizures, LAURA, superificial wound infection s/p wound vac closure, culture negative peritonitis due to suspected indolent biliary leakage s/p repeated sphincterotomy/biliary stent placement, subsequent loculated RLQ collection s/p perc-drainage and prolonged empiric antibiotics course, gastritis/duodenal ulcer 3/6/2018, and 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       Repeat stool ova and parasite  C/w flagyl & cipro  F/u CMV PCR  Recommend sampling of fluid collection vs ascites & would send for cell count w/ diff, as well as cultures (aerobic, anaerobic, fungal, AFB)        Anticipated Disposition: pending    Thank you for your consult. I will follow-up with patient. Please contact us if you have any additional questions.    Santos Osborn MD  Infectious Disease  [facility]    Subjective:     Principal Problem:Enteritis of infectious origin    HPI: 30 y/o M PMhx alcoholic cirrhosis s/p DDLT 10/19/2017 (CMV D+/R+, steroid induction, on maintenance sirolimus) c/b seizures, LAURA, superificial wound infection s/p wound vac closure, culture negative peritonitis due to suspected indolent biliary leakage s/p  repeated sphincterotomy/biliary stent placement, subsequent loculated RLQ collection s/p perc-drainage and prolonged empiric antibiotics course, gastritis/duodenal ulcer 3/6/2018, and 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   Interval History: n/v and abd pain much improved, diarrhea resolved    Review of Systems   Constitutional: Positive for fatigue. Negative for activity change, appetite change, chills and fever.   HENT: Negative for congestion, dental problem, mouth sores and sinus pressure.    Eyes: Negative for pain, redness and visual disturbance.   Respiratory: Negative for cough, shortness of breath and wheezing.    Cardiovascular: Negative for chest pain and leg swelling.   Gastrointestinal: Positive for abdominal pain, nausea and vomiting. Negative for abdominal distention and diarrhea.   Endocrine: Negative for polyuria.   Genitourinary: Negative for decreased urine volume, dysuria and frequency.   Musculoskeletal: Negative for joint swelling.   Skin: Negative for color change.   Allergic/Immunologic: Negative for food allergies.   Neurological: Negative for dizziness, weakness and headaches.   Hematological: Negative for adenopathy.   Psychiatric/Behavioral: Negative for agitation and confusion. The patient is not nervous/anxious.      Objective:     Vital Signs (Most Recent):  Temp: 98.4 °F (36.9 °C) (12/08/19 1214)  Pulse: 91 (12/08/19 1214)  Resp: 18 (12/08/19 1214)  BP: 136/81 (12/08/19 1214)  SpO2: 99 % (12/08/19 1214) Vital Signs (24h Range):  Temp:  [96.9 °F (36.1 °C)-98.7 °F (37.1 °C)] 98.4 °F (36.9 °C)  Pulse:  [76-93] 91  Resp:  [16-18]  18  SpO2:  [98 %-100 %] 99 %  BP: (124-141)/(67-82) 136/81     Weight: 65.1 kg (143 lb 8.3 oz)  Body mass index is 23.16 kg/m².    Estimated Creatinine Clearance: 57.3 mL/min (A) (based on SCr of 1.7 mg/dL (H)).    Physical Exam   Constitutional: He is oriented to person, place, and time. He appears well-developed and well-nourished.   HENT:   Head: Normocephalic and atraumatic.   Mouth/Throat: Oropharynx is clear and moist.   Eyes: Conjunctivae are normal.   Neck: Neck supple.   Cardiovascular: Normal rate, regular rhythm and normal heart sounds.   No murmur heard.  Pulmonary/Chest: Effort normal and breath sounds normal. No respiratory distress. He has no wheezes.   Abdominal: Soft. Bowel sounds are normal. He exhibits no distension. There is tenderness.   Musculoskeletal: Normal range of motion. He exhibits no edema or tenderness.   Lymphadenopathy:     He has no cervical adenopathy.   Neurological: He is alert and oriented to person, place, and time. Coordination normal.   Skin: Skin is warm and dry. No rash noted.   Psychiatric: He has a normal mood and affect. His behavior is normal.       Significant Labs:   CBC:   Recent Labs   Lab 12/06/19 1739 12/07/19  0655 12/08/19  0423   WBC 7.00 5.66 3.87*   HGB 8.6* 7.9* 6.5*   HCT 27.5* 25.0* 21.8*    209 160     CMP:   Recent Labs   Lab 12/06/19 1739 12/07/19  0656 12/08/19  0423    138 139   K 4.4 3.8 4.0    107 112*   CO2 20* 21* 20*   GLU 65* 115* 84   BUN 15 14 11   CREATININE 2.0* 1.7* 1.7*   CALCIUM 9.2 8.1* 7.7*   PROT 8.0 7.1 6.6   ALBUMIN 2.7* 2.4* 2.3*   BILITOT 0.4 0.2 0.2   ALKPHOS 142* 125 114   AST 22 19 21   ALT 8* 7* 7*   ANIONGAP 14 10 7*   EGFRNONAA 43.5* 52.9* 52.9*       Significant Imaging: I have reviewed all pertinent imaging results/findings within the past 24 hours.

## 2019-12-08 NOTE — NURSING
Blood was released however IV access lost so blood sent back up to blood bank and new IV attempted by charge nurse. Successful IV insertion, pt requesting a few minutes before blood is started. Will continue to monitor.

## 2019-12-09 ENCOUNTER — TELEPHONE (OUTPATIENT)
Dept: TRANSPLANT | Facility: CLINIC | Age: 29
End: 2019-12-09

## 2019-12-09 PROBLEM — K65.2 SBP (SPONTANEOUS BACTERIAL PERITONITIS): Status: ACTIVE | Noted: 2019-12-09

## 2019-12-09 LAB
ALBUMIN SERPL BCP-MCNC: 2.3 G/DL (ref 3.5–5.2)
ALP SERPL-CCNC: 122 U/L (ref 55–135)
ALT SERPL W/O P-5'-P-CCNC: 8 U/L (ref 10–44)
ANION GAP SERPL CALC-SCNC: 8 MMOL/L (ref 8–16)
APPEARANCE FLD: NORMAL
AST SERPL-CCNC: 23 U/L (ref 10–40)
BASOPHILS # BLD AUTO: 0.02 K/UL (ref 0–0.2)
BASOPHILS NFR BLD: 0.5 % (ref 0–1.9)
BASOPHILS NFR FLD MANUAL: 2 %
BILIRUB SERPL-MCNC: 0.3 MG/DL (ref 0.1–1)
BODY FLD TYPE: NORMAL
BUN SERPL-MCNC: 8 MG/DL (ref 6–20)
CALCIUM SERPL-MCNC: 8 MG/DL (ref 8.7–10.5)
CHLORIDE SERPL-SCNC: 111 MMOL/L (ref 95–110)
CMV DNA SERPL NAA+PROBE-ACNC: NORMAL IU/ML
CO2 SERPL-SCNC: 21 MMOL/L (ref 23–29)
COLOR FLD: YELLOW
CREAT SERPL-MCNC: 1.6 MG/DL (ref 0.5–1.4)
DAT IGG-SP REAG RBC-IMP: NORMAL
DIFFERENTIAL METHOD: ABNORMAL
EOSINOPHIL # BLD AUTO: 0.1 K/UL (ref 0–0.5)
EOSINOPHIL NFR BLD: 3.3 % (ref 0–8)
ERYTHROCYTE [DISTWIDTH] IN BLOOD BY AUTOMATED COUNT: 15.9 % (ref 11.5–14.5)
EST. GFR  (AFRICAN AMERICAN): >60 ML/MIN/1.73 M^2
EST. GFR  (NON AFRICAN AMERICAN): 57 ML/MIN/1.73 M^2
FIBRINOGEN PPP-MCNC: 462 MG/DL (ref 182–366)
GLUCOSE SERPL-MCNC: 84 MG/DL (ref 70–110)
HAPTOGLOB SERPL-MCNC: 231 MG/DL (ref 30–250)
HCT VFR BLD AUTO: 24.4 % (ref 40–54)
HGB BLD-MCNC: 7.4 G/DL (ref 14–18)
IMM GRANULOCYTES # BLD AUTO: 0.02 K/UL (ref 0–0.04)
IMM GRANULOCYTES NFR BLD AUTO: 0.5 % (ref 0–0.5)
LDH SERPL L TO P-CCNC: 153 U/L (ref 110–260)
LYMPHOCYTES # BLD AUTO: 1.1 K/UL (ref 1–4.8)
LYMPHOCYTES NFR BLD: 29.3 % (ref 18–48)
LYMPHOCYTES NFR FLD MANUAL: 43 %
MAGNESIUM SERPL-MCNC: 1.5 MG/DL (ref 1.6–2.6)
MCH RBC QN AUTO: 27 PG (ref 27–31)
MCHC RBC AUTO-ENTMCNC: 30.3 G/DL (ref 32–36)
MCV RBC AUTO: 89 FL (ref 82–98)
MESOTHL CELL NFR FLD MANUAL: 2 %
MONOCYTES # BLD AUTO: 0.4 K/UL (ref 0.3–1)
MONOCYTES NFR BLD: 12.1 % (ref 4–15)
MONOS+MACROS NFR FLD MANUAL: 26 %
NEUTROPHILS # BLD AUTO: 2 K/UL (ref 1.8–7.7)
NEUTROPHILS NFR BLD: 54.3 % (ref 38–73)
NEUTROPHILS NFR FLD MANUAL: 27 %
NRBC BLD-RTO: 0 /100 WBC
PHOSPHATE SERPL-MCNC: 3.8 MG/DL (ref 2.7–4.5)
PLATELET # BLD AUTO: 155 K/UL (ref 150–350)
PMV BLD AUTO: 9.6 FL (ref 9.2–12.9)
POTASSIUM SERPL-SCNC: 3.9 MMOL/L (ref 3.5–5.1)
PROT SERPL-MCNC: 6.8 G/DL (ref 6–8.4)
RBC # BLD AUTO: 2.74 M/UL (ref 4.6–6.2)
RETICS/RBC NFR AUTO: 0.9 % (ref 0.4–2)
SIROLIMUS BLD-MCNC: <2 NG/ML (ref 4–20)
SODIUM SERPL-SCNC: 140 MMOL/L (ref 136–145)
WBC # BLD AUTO: 3.65 K/UL (ref 3.9–12.7)
WBC # FLD: 679 /CU MM

## 2019-12-09 PROCEDURE — 87206 SMEAR FLUORESCENT/ACID STAI: CPT

## 2019-12-09 PROCEDURE — 99233 SBSQ HOSP IP/OBS HIGH 50: CPT | Mod: ,,, | Performed by: INTERNAL MEDICINE

## 2019-12-09 PROCEDURE — 36415 COLL VENOUS BLD VENIPUNCTURE: CPT

## 2019-12-09 PROCEDURE — 87116 MYCOBACTERIA CULTURE: CPT

## 2019-12-09 PROCEDURE — 63600175 PHARM REV CODE 636 W HCPCS: Performed by: INTERNAL MEDICINE

## 2019-12-09 PROCEDURE — 83615 LACTATE (LD) (LDH) ENZYME: CPT

## 2019-12-09 PROCEDURE — 85384 FIBRINOGEN ACTIVITY: CPT

## 2019-12-09 PROCEDURE — 87070 CULTURE OTHR SPECIMN AEROBIC: CPT

## 2019-12-09 PROCEDURE — 87102 FUNGUS ISOLATION CULTURE: CPT

## 2019-12-09 PROCEDURE — 88112 PR  CYTOPATH, CELL ENHANCE TECH: ICD-10-PCS | Mod: 26,,, | Performed by: PATHOLOGY

## 2019-12-09 PROCEDURE — 99233 PR SUBSEQUENT HOSPITAL CARE,LEVL III: ICD-10-PCS | Mod: ,,, | Performed by: INTERNAL MEDICINE

## 2019-12-09 PROCEDURE — 80053 COMPREHEN METABOLIC PANEL: CPT

## 2019-12-09 PROCEDURE — 99233 SBSQ HOSP IP/OBS HIGH 50: CPT | Mod: ,,, | Performed by: HOSPITALIST

## 2019-12-09 PROCEDURE — 83010 ASSAY OF HAPTOGLOBIN QUANT: CPT

## 2019-12-09 PROCEDURE — 63600175 PHARM REV CODE 636 W HCPCS: Performed by: HOSPITALIST

## 2019-12-09 PROCEDURE — S0030 INJECTION, METRONIDAZOLE: HCPCS | Performed by: HOSPITALIST

## 2019-12-09 PROCEDURE — 88305 TISSUE EXAM BY PATHOLOGIST: CPT | Performed by: PATHOLOGY

## 2019-12-09 PROCEDURE — 83735 ASSAY OF MAGNESIUM: CPT

## 2019-12-09 PROCEDURE — 84100 ASSAY OF PHOSPHORUS: CPT

## 2019-12-09 PROCEDURE — 85025 COMPLETE CBC W/AUTO DIFF WBC: CPT

## 2019-12-09 PROCEDURE — 99233 PR SUBSEQUENT HOSPITAL CARE,LEVL III: ICD-10-PCS | Mod: ,,, | Performed by: HOSPITALIST

## 2019-12-09 PROCEDURE — 88305 TISSUE EXAM BY PATHOLOGIST: ICD-10-PCS | Mod: 26,,, | Performed by: PATHOLOGY

## 2019-12-09 PROCEDURE — 88112 CYTOPATH CELL ENHANCE TECH: CPT | Mod: 26,,, | Performed by: PATHOLOGY

## 2019-12-09 PROCEDURE — 88112 CYTOPATH CELL ENHANCE TECH: CPT | Performed by: PATHOLOGY

## 2019-12-09 PROCEDURE — 89051 BODY FLUID CELL COUNT: CPT

## 2019-12-09 PROCEDURE — 80195 ASSAY OF SIROLIMUS: CPT

## 2019-12-09 PROCEDURE — 85045 AUTOMATED RETICULOCYTE COUNT: CPT

## 2019-12-09 PROCEDURE — 86880 COOMBS TEST DIRECT: CPT

## 2019-12-09 PROCEDURE — 88305 TISSUE EXAM BY PATHOLOGIST: CPT | Mod: 26,,, | Performed by: PATHOLOGY

## 2019-12-09 PROCEDURE — 25000003 PHARM REV CODE 250: Performed by: HOSPITALIST

## 2019-12-09 PROCEDURE — 87075 CULTR BACTERIA EXCEPT BLOOD: CPT

## 2019-12-09 PROCEDURE — 20600001 HC STEP DOWN PRIVATE ROOM

## 2019-12-09 RX ORDER — LORAZEPAM/0.9% SODIUM CHLORIDE 100MG/0.1L
2 PLASTIC BAG, INJECTION (ML) INTRAVENOUS ONCE
Status: COMPLETED | OUTPATIENT
Start: 2019-12-09 | End: 2019-12-09

## 2019-12-09 RX ORDER — CIPROFLOXACIN 500 MG/1
500 TABLET ORAL EVERY 12 HOURS
Status: DISCONTINUED | OUTPATIENT
Start: 2019-12-09 | End: 2019-12-09

## 2019-12-09 RX ORDER — METRONIDAZOLE 500 MG/1
500 TABLET ORAL EVERY 8 HOURS
Status: DISCONTINUED | OUTPATIENT
Start: 2019-12-09 | End: 2019-12-10

## 2019-12-09 RX ADMIN — DICYCLOMINE HYDROCHLORIDE 10 MG: 10 CAPSULE ORAL at 05:12

## 2019-12-09 RX ADMIN — MAGNESIUM SULFATE IN WATER 2 G: 40 INJECTION, SOLUTION INTRAVENOUS at 09:12

## 2019-12-09 RX ADMIN — HYDROMORPHONE HYDROCHLORIDE 1 MG: 1 INJECTION, SOLUTION INTRAMUSCULAR; INTRAVENOUS; SUBCUTANEOUS at 11:12

## 2019-12-09 RX ADMIN — POLYETHYLENE GLYCOL 3350 17 G: 17 POWDER, FOR SOLUTION ORAL at 07:12

## 2019-12-09 RX ADMIN — HYDROMORPHONE HYDROCHLORIDE 1 MG: 1 INJECTION, SOLUTION INTRAMUSCULAR; INTRAVENOUS; SUBCUTANEOUS at 03:12

## 2019-12-09 RX ADMIN — METRONIDAZOLE 500 MG: 500 TABLET ORAL at 01:12

## 2019-12-09 RX ADMIN — OXYCODONE HYDROCHLORIDE 15 MG: 10 TABLET ORAL at 08:12

## 2019-12-09 RX ADMIN — OXYCODONE HYDROCHLORIDE 15 MG: 10 TABLET ORAL at 09:12

## 2019-12-09 RX ADMIN — METRONIDAZOLE 500 MG: 500 INJECTION, SOLUTION INTRAVENOUS at 02:12

## 2019-12-09 RX ADMIN — OXYCODONE HYDROCHLORIDE 15 MG: 10 TABLET ORAL at 02:12

## 2019-12-09 RX ADMIN — POLYETHYLENE GLYCOL 3350 17 G: 17 POWDER, FOR SOLUTION ORAL at 08:12

## 2019-12-09 RX ADMIN — SIROLIMUS 1 MG: 1 TABLET ORAL at 08:12

## 2019-12-09 RX ADMIN — OXYCODONE HYDROCHLORIDE 15 MG: 10 TABLET ORAL at 01:12

## 2019-12-09 RX ADMIN — CIPROFLOXACIN HYDROCHLORIDE 500 MG: 500 TABLET, FILM COATED ORAL at 09:12

## 2019-12-09 RX ADMIN — CEFTRIAXONE 2 G: 2 INJECTION, SOLUTION INTRAVENOUS at 10:12

## 2019-12-09 RX ADMIN — DICYCLOMINE HYDROCHLORIDE 10 MG: 10 CAPSULE ORAL at 08:12

## 2019-12-09 RX ADMIN — DICYCLOMINE HYDROCHLORIDE 10 MG: 10 CAPSULE ORAL at 09:12

## 2019-12-09 RX ADMIN — DICYCLOMINE HYDROCHLORIDE 10 MG: 10 CAPSULE ORAL at 01:12

## 2019-12-09 RX ADMIN — METRONIDAZOLE 500 MG: 500 TABLET ORAL at 09:12

## 2019-12-09 RX ADMIN — PANTOPRAZOLE SODIUM 40 MG: 40 TABLET, DELAYED RELEASE ORAL at 08:12

## 2019-12-09 RX ADMIN — HYDROMORPHONE HYDROCHLORIDE 1 MG: 1 INJECTION, SOLUTION INTRAMUSCULAR; INTRAVENOUS; SUBCUTANEOUS at 04:12

## 2019-12-09 RX ADMIN — HYDROMORPHONE HYDROCHLORIDE 1 MG: 1 INJECTION, SOLUTION INTRAMUSCULAR; INTRAVENOUS; SUBCUTANEOUS at 09:12

## 2019-12-09 RX ADMIN — OXYCODONE HYDROCHLORIDE 15 MG: 10 TABLET ORAL at 05:12

## 2019-12-09 NOTE — ASSESSMENT & PLAN NOTE
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     New erythema w/ some induration of lower abdomen w/ associated pruritis concerning for but not characteristic of zoster. Does not follow any particular dermatome. Will continue to monitor for now.  Repeat stool ova and parasite   C/w flagyl & cipro  F/u CMV PCR  F/u paracentesis & woudl send for cell count w/ diff, as well as cultures (aerobic, anaerobic, fungal, AFB)

## 2019-12-09 NOTE — PLAN OF CARE
No acute changes overnight. VSS. Hourly rounding performed. Safety maintained. Pt remained free of falls. Special contact precautions maintained. PRN pain meds given per order. Pt resting. Family at bedside. No needs at this time. Call bell in reach. Will continue to monitor.

## 2019-12-09 NOTE — PROCEDURES
Radiology Post-Procedure Note    Pre Op Diagnosis: Ascites  Post Op Diagnosis: Same    Procedure: Ultrasound Guided Paracentesis    Procedure performed by: Jenn JOLLEY, Jacquelyn     Written Informed Consent Obtained: Yes  Specimen Removed: YES clear yellow  Estimated Blood Loss: Minimal    Findings:   Successful paracentesis.  Albumin administered PRN per protocol.    Patient tolerated procedure well.    Jacquelyn Barajas, APRN, FNP  Interventional Radiology  [phone] clinic

## 2019-12-09 NOTE — PROGRESS NOTES
Progress Note   Hospital Medicine         Patient Name: Jhonny Diana  MRN:  [id number]  [facility] Medicine Team: [facility] MED L Armand Carrasco MD  Date of Admission:  12/6/2019     Length of Stay:  LOS: 3 days   Expected Discharge Date: 12/11/2019  Principal Problem:  Enteritis of infectious origin       Subjective:     Interval History/Overnight Events:  Patient doing well today, states nausea is much improved and pain is controlled; going for IR aspiration of fluid collection today for further evaluation; also CT ab/pelv CD is being uploaded to epic today and will have radiology evaluate it; need to determine why patient's fluid collection is worsening;     Update (1020 pm) - cell count came back with , since patient has already been on cipro BID for several days will treat as SBP and switch to rocephin 2g IV daily and follow up cultures     Review of Systems   Constitutional: Negative for chills, fatigue, fever.   HENT: Negative for sore throat, trouble swallowing.    Eyes: Negative for photophobia, visual disturbance.   Respiratory: Negative for cough, shortness of breath.    Cardiovascular: Negative for chest pain, palpitations, leg swelling.   Gastrointestinal: positive for abdominal pain, denies constipation, positive diarrhea, nausea, denies vomiting.   Endocrine: Negative for cold intolerance, heat intolerance.   Genitourinary: Negative for dysuria, frequency.   Musculoskeletal: Negative for arthralgias, myalgias.   Skin: Negative for rash, wound, erythema   Neurological: Negative for dizziness, syncope, weakness, light-headedness.   Psychiatric/Behavioral: Negative for confusion, hallucinations, anxiety  All other systems reviewed and are negative.    Objective:     Temp:  [98.1 °F (36.7 °C)-99.2 °F (37.3 °C)]   Pulse:  [82-92]   Resp:  [12-18]   BP: (123-147)/(74-87)   SpO2:  [97 %-100 %]       Physical Exam:  Constitutional: appears weak and frail   Head: Normocephalic and atraumatic.   Mouth/Throat:  Oropharynx is clear and moist.   Eyes: EOM are normal. Pupils are equal, round, and reactive to light. No scleral icterus.   Neck: Normal range of motion. Neck supple.   Cardiovascular: Normal rate and regular rhythm.  No murmur heard.  Pulmonary/Chest: Effort normal and breath sounds normal. No respiratory distress. No wheezes, rales, or rhonchi  Abdominal: Soft. Bowel sounds are normal.  positive distension and RUQ tenderness  Musculoskeletal: Normal range of motion. No edema.   Neurological: Alert and oriented to person, place, and time.   Skin: Skin is warm and dry.   Psychiatric: Normal mood and affect. Behavior is normal.     Recent Labs   Lab 12/06/19  1739 12/07/19  0655 12/08/19 0423 12/09/19 0438   WBC 7.00 5.66 3.87* 3.65*   HGB 8.6* 7.9* 6.5* 7.4*   HCT 27.5* 25.0* 21.8* 24.4*    209 160 155     Recent Labs   Lab 12/07/19  0656 12/08/19 0423 12/09/19 0438    139 140   K 3.8 4.0 3.9    112* 111*   CO2 21* 20* 21*   BUN 14 11 8   CREATININE 1.7* 1.7* 1.6*   * 84 84   CALCIUM 8.1* 7.7* 8.0*   MG 1.6 1.6 1.5*   PHOS 3.8 4.1 3.8     Recent Labs   Lab 12/06/19  1739 12/07/19  0656 12/08/19 0423 12/09/19 0438   ALKPHOS 142* 125 114 122   ALT 8* 7* 7* 8*   AST 22 19 21 23   ALBUMIN 2.7* 2.4* 2.3* 2.3*   PROT 8.0 7.1 6.6 6.8   BILITOT 0.4 0.2 0.2 0.3   INR 1.0  --   --   --      No results for input(s): POCTGLUCOSE in the last 168 hours.     ciprofloxacin HCl  500 mg Oral Q12H    dicyclomine  10 mg Oral QID    ergocalciferol  50,000 Units Oral Q7 Days    metroNIDAZOLE  500 mg Oral Q8H    pantoprazole  40 mg Oral Daily    sirolimus  1 mg Oral Daily       Assessment and Plan     Mr. Jhonny Lebron is a 29 y.o. male who presented to [facility] on 12/6/2019 with     Hospital Course:    Mr. Jhonny Diana was admitted to Hospital Medicine for management of     Active Hospital Problems    Diagnosis  POA    *Enteritis of infectious origin [A09]  Yes    Diarrhea [R19.7]  Yes     Gastroenteritis [K52.9]  Yes    LAURA (acute kidney injury) [N17.9]  Yes    Anemia of chronic disease [D63.8]  Yes    Moderate protein malnutrition [E44.0]  Yes    Intractable nausea and vomiting [R11.2]  Yes    S/P liver transplant [Z94.4]  Not Applicable     Chronic    Prophylactic immunotherapy [Z29.8]  Not Applicable     Chronic    Long-term use of immunosuppressant medication [Z79.899]  Not Applicable     Chronic    Metabolic acidosis [E87.2]  Yes      Resolved Hospital Problems    Diagnosis Date Resolved POA    Ascites [R18.8] 05/21/2018 Yes     Added automatically from request for surgery [id number]       # Infectious enteritis   # Giardia infection  # Intractable N/V  # Diarrhea   # Fluid collection   - patient recently diagnosed with giardia and discharged with PO flagly  - states when he got home his symptoms came back, N/V and worsening abdominal pain and diarrhea   - CT ab/pelv at OSH shows worsening fluid collection, image has been sent over and will need to placed in epic; unlikely ascites as patient does not have evidence of cirrhosis in his transplant; patient has had ex-laps which showed brown/green fluid, possible bile leak? Will need to review image with radiology and hepatology  - Ab X ray reviewed   -  on IV Flagyl and Cipro  - checking CMV   - transplant ID consulted, appreciate recs  - will plan on doing IR fluid aspiration today and send for cultures and cytology  - will also review imaging with IR, CD being uploaded to EPIC     # H/O Liver transplant   # Immunosuppressed status   - hepatology consulted  - cont rapamune  - check levels      # LAURA on CKD stage 3  # Metabolic acidosis   - Cr up to 2, baseline of 1.5  - likely due to dehydration  - 1L NS bolus, then start on 100 cc/hr, stopped now  - urine studies and renal U/S reviewed   - strict I/O  - improved to 1.6 today     # Microcytic Anemia  - checking iron studies, hemolysis labs  - transfusing 1 unit of PRBC on 12/8, responded  well     # Moderate protein jalen malnutrition  - PAB, ensure, dietary    # Hypomagnesemia   - replace      Diet:  bland  GI PPx:    DVT PPx:    Goals of Care:  full        Disposition:  later this week, possibly wednesday       Armand Carrasco MD  Medical Director [facility] Medicine  Spectra:  [phone]  Pager: [phone]

## 2019-12-09 NOTE — H&P
Inpatient Radiology Pre-procedure Note    History of Present Illness:  Jhonny Diana is a 29 y.o. male who presents for ultrasound guided paracentesis.  Admission H&P reviewed.  Past Medical History:   Diagnosis Date    Abdominal pain 5/27/2018    Alcoholic cirrhosis 8/14/2018    Alcoholic hepatitis with ascites     Anemia     CKD (chronic kidney disease) stage 3, GFR 30-59 ml/min 8/14/2018    Encounter for blood transfusion     multiple blood transfusions    ESRD on dialysis     History of hematemesis 9/28/2017    Hypertension     Seizures     Substance abuse     alcohol and opioids    Thrombocytopenia     Transplanted liver     10/2017     Past Surgical History:   Procedure Laterality Date    APPENDECTOMY      CENTRAL VENOUS CATHETER TUNNELED INSERTION DOUBLE LUMEN      inserted and removed    ERCP      ERCP N/A 7/2/2018    Procedure: ERCP;  Surgeon: Solis Villanueva MD;  [facility]  Service: Endoscopy;  Laterality: N/A;  dialysis/cirrhosis/labs prior to ERCP/svn  platelet    ESOPHAGOGASTRODUODENOSCOPY      ESOPHAGOGASTRODUODENOSCOPY N/A 11/25/2019    Procedure: EGD (ESOPHAGOGASTRODUODENOSCOPY);  Surgeon: Sheri Siddiqui MD;  Location: [facility] ([address] [place] [address]);  Service: Endoscopy;  Laterality: N/A;    LIVER TRANSPLANT      10/2017       Review of Systems:   As documented in primary team H&P    Home Meds:   Prior to Admission medications    Medication Sig Start Date End Date Taking? Authorizing Provider   bisacodyl (DULCOLAX) 5 mg EC tablet Take 5 mg by mouth daily as needed for Constipation.   Yes Historical Provider, MD   ergocalciferol (ERGOCALCIFEROL) 50,000 unit Cap Take 1 capsule (50,000 Units total) by mouth every 7 days.  Patient taking differently: Take 50,000 Units by mouth every 7 days. Saturday 11/30/19  Yes Michelle Rowe MD   promethazine (PHENERGAN) 25 MG tablet Take 1 tablet (25 mg total) by mouth every 6 (six) hours as needed for Nausea. 11/30/19  Yes Last  Jose Moffett MD   sirolimus 0.5 mg Tab Take 1 tablet (0.5 mg total) by mouth once daily. 11/30/19  Yes Last Moffett MD   acetaminophen (TYLENOL ORAL) Take 1 tablet by mouth daily as needed (for fever).  12/9/19  Historical Provider, MD     Scheduled Meds:    ciprofloxacin HCl  500 mg Oral Q12H    dicyclomine  10 mg Oral QID    ergocalciferol  50,000 Units Oral Q7 Days    metroNIDAZOLE  500 mg Oral Q8H    pantoprazole  40 mg Oral Daily    sirolimus  1 mg Oral Daily     Continuous Infusions:   PRN Meds:acetaminophen, Dextrose 10% Bolus, Dextrose 10% Bolus, glucagon (human recombinant), glucose, glucose, HYDROmorphone, ondansetron, oxyCODONE, polyethylene glycol, promethazine (PHENERGAN) IVPB  Anticoagulants/Antiplatelets: no anticoagulation    Allergies:   Review of patient's allergies indicates:   Allergen Reactions    Bactrim [sulfamethoxazole-trimethoprim] Other (See Comments)     Mookie Trell Syndrome     Sedation Hx: have not been any systemic reactions    Vitals:  Temp: 98.2 °F (36.8 °C) (12/09/19 1558)  Pulse: 88 (12/09/19 1558)  Resp: 18 (12/09/19 1558)  BP: (!) 142/82 (12/09/19 1558)  SpO2: 98 % (12/09/19 1558)     Physical Exam:  ASA: 3  Mallampati: n/a    General: no acute distress  Mental Status: alert and oriented to person, place and time  HEENT: normocephalic, atraumatic  Chest: unlabored breathing  Heart: regular heart rate  Abdomen: distended  Extremity: moves all extremities    Plan: ultrasound guided paracentesis  Sedation Plan: local    LANE Miles, FNP  Interventional Radiology  [phone] clinic

## 2019-12-09 NOTE — SUBJECTIVE & OBJECTIVE
Interval History:   - reports he started to feel back to baseline.  Nausea significantly improved.  - up in ambulatory in the halls without issue.  - abdominal pain improved but still persistent  - no diarrhea.  - LFTs remain stable.  - creatinine improved to 1.6    Current Facility-Administered Medications   Medication    acetaminophen tablet 650 mg    ciprofloxacin HCl tablet 500 mg    dextrose 10% (D10W) Bolus    dextrose 10% (D10W) Bolus    dicyclomine capsule 10 mg    ergocalciferol capsule 50,000 Units    glucagon (human recombinant) injection 1 mg    glucose chewable tablet 16 g    glucose chewable tablet 24 g    HYDROmorphone injection 1 mg    metroNIDAZOLE tablet 500 mg    ondansetron injection 8 mg    oxyCODONE immediate release tablet 15 mg    pantoprazole EC tablet 40 mg    polyethylene glycol packet 17 g    promethazine (PHENERGAN) 25 mg in dextrose 5 % 50 mL IVPB    sirolimus tablet 1 mg       Objective:     Vital Signs (Most Recent):  Temp: 98.7 °F (37.1 °C) (12/09/19 1147)  Pulse: 90 (12/09/19 1147)  Resp: 18 (12/09/19 1147)  BP: 137/77 (12/09/19 1147)  SpO2: 100 % (12/09/19 1147) Vital Signs (24h Range):  Temp:  [98.1 °F (36.7 °C)-99.2 °F (37.3 °C)] 98.7 °F (37.1 °C)  Pulse:  [82-92] 90  Resp:  [12-18] 18  SpO2:  [97 %-100 %] 100 %  BP: (123-147)/(74-87) 137/77     Weight: 65.1 kg (143 lb 8.3 oz) (12/06/19 1951)  Body mass index is 23.16 kg/m².    Physical Exam   Constitutional: He is oriented to person, place, and time. No distress.   Comfortable appearing, sitting in chair, mother bedside.  Up in ambulatory in the halls..   HENT:   Mouth/Throat: Oropharynx is clear and moist.   Eyes: No scleral icterus.   Pulmonary/Chest: Effort normal. No respiratory distress.   Abdominal: Soft. He exhibits no distension. There is tenderness.   Soft, mild tenderness. Umbilical hernia.   Musculoskeletal: He exhibits no edema or tenderness.   Neurological: He is alert and oriented to person, place,  and time.   Skin: Skin is warm. Capillary refill takes less than 2 seconds. He is not diaphoretic.   Slightly excoriation along umbilicus   Psychiatric: He has a normal mood and affect.   Nursing note and vitals reviewed.      MELD-Na score: 12 at 12/8/2019  4:23 AM  MELD score: 12 at 12/8/2019  4:23 AM  Calculated from:  Serum Creatinine: 1.7 mg/dL at 12/8/2019  4:23 AM  Serum Sodium: 139 mmol/L (Rounded to 137 mmol/L) at 12/8/2019  4:23 AM  Total Bilirubin: 0.2 mg/dL (Rounded to 1 mg/dL) at 12/8/2019  4:23 AM  INR(ratio): 1.0 at 12/6/2019  5:39 PM  Age: 29 years    Significant Labs:  Labs within the past month have been reviewed.    Significant Imaging:  Labs: Reviewed

## 2019-12-09 NOTE — TELEPHONE ENCOUNTER
[facility] Liver Pathology Conference Note    Patient:  Jhonny Diana  MRN:   [id number]  YOB: 1990  Date of Transplant:  10/19/17  Native Diagnosis: Acute Alcoholic Hepatitis    Discussion/Plan:    Presenter: Hepatologist - Suzanne    Reason for presenting: Patient is s/p liver transplant.  Recently admitted for acute on chronic abd pain, fever, enteritis. Stool notable for giardia.  Readmitted with N/V/D and abd pain.  CT from outside hospital concerning for worsening abdominal fluid.     Concerns for Pathologists:     Lab Results  WBC (K/uL)   Date Value   12/09/2019 3.65 (L)   12/08/2019 3.87 (L)   12/07/2019 5.66     PLT (K/uL)   Date Value   12/09/2019 155   12/08/2019 160   12/07/2019 209     TACROLIMUS (ng/mL)   Date Value   03/04/2018 <1.5 (L)   03/03/2018 1.6 (L)   03/02/2018 3.2 (L)   03/02/2018 3.2 (L)   03/02/2018 3.2 (L)     SIROLIMUSLEV (ng/mL)   Date Value   12/09/2019 <2.0 (L)   12/07/2019 <2.0 (L)   11/30/2019 5.9     CYCLOSPORINE (ng/mL)   Date Value   06/02/2018 <10 (L)   06/01/2018 10 (L)   05/31/2018 22 (L)     INR (no units)   Date Value   12/06/2019 1.0   11/30/2019 1.0   11/29/2019 1.0     AST (U/L)   Date Value   12/09/2019 23     ALT (U/L)   Date Value   12/09/2019 8 (L)   12/08/2019 7 (L)   12/07/2019 7 (L)     BILITOT (mg/dL)   Date Value   12/09/2019 0.3   12/08/2019 0.2   12/07/2019 0.2     ALKPHOS (U/L)   Date Value   12/09/2019 122   12/08/2019 114   12/07/2019 125     CREATININE (mg/dL)   Date Value   12/09/2019 1.6 (H)   12/08/2019 1.7 (H)   12/07/2019 1.7 (H)     AFP (ng/mL)   Date Value   07/27/2018 7.3     CMVIGGINTERP (no units)   Date Value   08/14/2018 Reactive (A)   10/06/2017 Reactive (A)

## 2019-12-09 NOTE — PROGRESS NOTES
[facility]  Infectious Disease  Progress Note    Patient Name: Jhonny Diana  MRN: [id number]  Admission Date: 12/6/2019  Length of Stay: 3 days  Attending Physician: Armand Carrasco MD  Primary Care Provider: Jhonny Angulo MD    Isolation Status: No active isolations  Assessment/Plan:      S/P liver transplant  30 y/o M PMhx alcoholic cirrhosis s/p DDLT 10/19/2017 (CMV D+/R+, steroid induction, on maintenance sirolimus) c/b seizures, LAURA, superificial wound infection s/p wound vac closure, culture negative peritonitis due to suspected indolent biliary leakage s/p repeated sphincterotomy/biliary stent placement, subsequent loculated RLQ collection s/p perc-drainage and prolonged empiric antibiotics course, gastritis/duodenal ulcer 3/6/2018, and 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     New erythema w/ some induration of lower abdomen w/ associated pruritis concerning for but not characteristic of zoster. Does not follow any particular dermatome. Will continue to monitor for now.  Repeat stool ova and parasite   C/w flagyl & cipro  F/u CMV PCR  F/u paracentesis & woudl send for cell count w/ diff, as well as cultures (aerobic, anaerobic, fungal, AFB)        Thank you for your consult. I will follow-up with patient. Please contact us if you have any additional questions.    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   Interval History: Remains afebrile. Still w/ abd pain, mostly R-sided. States now has swelling of skin on his R-side. Diarrhea resolved. Able to tolerate food. Planned for paracentesis today.    Review of Systems   Constitutional: Negative for chills and fever.   HENT: Negative for trouble swallowing.    Eyes: Negative for photophobia.   Respiratory: Negative for shortness of breath.    Cardiovascular: Negative for chest pain and leg swelling.   Gastrointestinal: Positive for abdominal distention and abdominal pain. Negative for diarrhea, nausea and vomiting.   Genitourinary: Negative for dysuria.   Musculoskeletal: Negative for arthralgias and myalgias.   Skin: Negative for rash.   Neurological: Negative for headaches.   Psychiatric/Behavioral: Negative for agitation and confusion.     Objective:     Vital Signs (Most Recent):  Temp: 98.2 °F (36.8 °C) (12/09/19 1558)  Pulse: 88 (12/09/19 1558)  Resp: 18 (12/09/19 1558)  BP: (!) 142/82 (12/09/19 1558)  SpO2: 98 % (12/09/19 1558) Vital Signs (24h Range):  Temp:  [98.1 °F (36.7 °C)-99.2 °F (37.3 °C)] 98.2 °F (36.8 °C)  Pulse:  [82-92] 88  Resp:  [12-18] 18  SpO2:  [97 %-100 %] 98 %  BP: (129-147)/(74-87) 142/82     Weight: 65.1 kg (143 lb 8.3 oz)  Body mass index  is 23.16 kg/m².    Estimated Creatinine Clearance: 60.9 mL/min (A) (based on SCr of 1.6 mg/dL (H)).    Physical Exam   Constitutional: He is oriented to person, place, and time. He appears well-developed. No distress.   HENT:   Head: Normocephalic and atraumatic.   Mouth/Throat: Oropharynx is clear and moist. No oropharyngeal exudate.   Eyes: Conjunctivae are normal. Right eye exhibits no discharge. Left eye exhibits no discharge. No scleral icterus.   Neck: Normal range of motion. Neck supple.   Cardiovascular: Normal rate, regular rhythm and intact distal pulses.   Pulmonary/Chest: Effort normal and breath sounds normal. No stridor. No respiratory distress. He has no wheezes. He has no rales.   Abdominal: Soft. Bowel sounds are normal. He exhibits distension. There is tenderness. There is no guarding.   Reducible umbilical hernia  Induration of skin RLQ   Musculoskeletal: He exhibits no edema or tenderness.   Lymphadenopathy:     He has no cervical adenopathy.   Neurological: He is alert and oriented to person, place, and time.   Skin: Skin is warm. He is not diaphoretic.   Psychiatric: He has a normal mood and affect. His behavior is normal.   Nursing note and vitals reviewed.      Significant Labs:   Blood Culture:   Recent Labs   Lab 11/23/19 1953 11/27/19  0944 11/27/19  0954 12/06/19  1738 12/06/19 1739   LABBLOO No growth after 5 days.  No growth after 5 days. No growth after 5 days. No growth after 5 days. No Growth to date  No Growth to date  No Growth to date No Growth to date  No Growth to date  No Growth to date     CBC:   Recent Labs   Lab 12/08/19 0423 12/09/19 0438   WBC 3.87* 3.65*   HGB 6.5* 7.4*   HCT 21.8* 24.4*    155     CMP:   Recent Labs   Lab 12/08/19 0423 12/09/19 0438    140   K 4.0 3.9   * 111*   CO2 20* 21*   GLU 84 84   BUN 11 8   CREATININE 1.7* 1.6*   CALCIUM 7.7* 8.0*   PROT 6.6 6.8   ALBUMIN 2.3* 2.3*   BILITOT 0.2 0.3   ALKPHOS 114 122   AST 21 23    ALT 7* 8*   ANIONGAP 7* 8   EGFRNONAA 52.9* 57.0*     Microbiology Results (last 7 days)     Procedure Component Value Units Date/Time    Culture, Anaerobe [[id number]] Collected:  12/09/19 1636    Order Status:  Sent Specimen:  Body Fluid from Ascites Updated:  12/09/19 1636    Fungus culture [[id number]] Collected:  12/09/19 1636    Order Status:  Sent Specimen:  Body Fluid from Ascites Updated:  12/09/19 1636    AFB Culture & Smear [[id number]] Collected:  12/09/19 1636    Order Status:  Sent Specimen:  Body Fluid from Ascites Updated:  12/09/19 1636    Aerobic culture [[id number]] Collected:  12/09/19 1636    Order Status:  Sent Specimen:  Body Fluid from Ascites Updated:  12/09/19 1636    Blood culture [[id number]] Collected:  12/06/19 1739    Order Status:  Completed Specimen:  Blood Updated:  12/09/19 0612     Blood Culture, Routine No Growth to date      No Growth to date      No Growth to date    Blood culture [[id number]] Collected:  12/06/19 1738    Order Status:  Completed Specimen:  Blood Updated:  12/09/19 0612     Blood Culture, Routine No Growth to date      No Growth to date      No Growth to date    Stool culture [[id number]]     Order Status:  No result Specimen:  Stool     Clostridium difficile EIA [[id number]]     Order Status:  Canceled Specimen:  Stool           Significant Imaging: I have reviewed all pertinent imaging results/findings within the past 24 hours.

## 2019-12-09 NOTE — PROGRESS NOTES
[facility]  Hepatology  Progress Note    Patient Name: Jhonny Diana  MRN: [id number]  Admission Date: 12/6/2019  Hospital Length of Stay: 3 days  Attending Provider: Armand Carrasco MD   Primary Care Physician: Jhonny Angulo MD  Principal Problem:Enteritis of infectious origin    Subjective:     Transplant status: Post-transplant    HPI: Mr Diana is a 29 year old man with history of ETOH Hep s/p OLT 10/19/17, with post-transplant period notable for LAURA previously on HD, anastomotic stricture, ETOH relapse (6/2019), who is presenting to the hospital due n/v/d.    He was recently admitted (11/22) due to concern for acute on chronic abdominal pain, fever, and imaging at OSH concerning for enteritis. He was hospitalized at OSH with concern for enteritis, with antibiotics and subsequently presented to [facility] for further workup. While admitted, stool O&P was notable for giardia with improvement in his symptoms with antibiotics and was discharged.    He notes he was initially doing well after leaving the hospital but developed nausea, vomiting and diarrhea and inability to tolerate PO and medications therefore presented to the hospital. Was unable to tolerate his flagyl but was able to take his sirolimus. Denies f/c/s.  Endorses diffuse abdominal pain (predominantly RUQ) which is slightly worse than baseline. CT scan was concerning for worsening abdominal fluid (OSH, pending upload).      Interval History:   - reports he started to feel back to baseline.  Nausea significantly improved.  - up in ambulatory in the halls without issue.  - abdominal pain improved but still persistent  - no diarrhea.  - LFTs remain stable.  - creatinine improved to 1.6    Current Facility-Administered Medications   Medication    acetaminophen tablet 650 mg    ciprofloxacin HCl tablet 500 mg    dextrose 10% (D10W) Bolus    dextrose 10% (D10W) Bolus    dicyclomine capsule 10 mg    ergocalciferol capsule 50,000 Units     glucagon (human recombinant) injection 1 mg    glucose chewable tablet 16 g    glucose chewable tablet 24 g    HYDROmorphone injection 1 mg    metroNIDAZOLE tablet 500 mg    ondansetron injection 8 mg    oxyCODONE immediate release tablet 15 mg    pantoprazole EC tablet 40 mg    polyethylene glycol packet 17 g    promethazine (PHENERGAN) 25 mg in dextrose 5 % 50 mL IVPB    sirolimus tablet 1 mg       Objective:     Vital Signs (Most Recent):  Temp: 98.7 °F (37.1 °C) (12/09/19 1147)  Pulse: 90 (12/09/19 1147)  Resp: 18 (12/09/19 1147)  BP: 137/77 (12/09/19 1147)  SpO2: 100 % (12/09/19 1147) Vital Signs (24h Range):  Temp:  [98.1 °F (36.7 °C)-99.2 °F (37.3 °C)] 98.7 °F (37.1 °C)  Pulse:  [82-92] 90  Resp:  [12-18] 18  SpO2:  [97 %-100 %] 100 %  BP: (123-147)/(74-87) 137/77     Weight: 65.1 kg (143 lb 8.3 oz) (12/06/19 1951)  Body mass index is 23.16 kg/m².    Physical Exam   Constitutional: He is oriented to person, place, and time. No distress.   Comfortable appearing, sitting in chair, mother bedside.  Up in ambulatory in the halls..   HENT:   Mouth/Throat: Oropharynx is clear and moist.   Eyes: No scleral icterus.   Pulmonary/Chest: Effort normal. No respiratory distress.   Abdominal: Soft. He exhibits no distension. There is tenderness.   Soft, mild tenderness. Umbilical hernia.   Musculoskeletal: He exhibits no edema or tenderness.   Neurological: He is alert and oriented to person, place, and time.   Skin: Skin is warm. Capillary refill takes less than 2 seconds. He is not diaphoretic.   Slightly excoriation along umbilicus   Psychiatric: He has a normal mood and affect.   Nursing note and vitals reviewed.      MELD-Na score: 12 at 12/8/2019  4:23 AM  MELD score: 12 at 12/8/2019  4:23 AM  Calculated from:  Serum Creatinine: 1.7 mg/dL at 12/8/2019  4:23 AM  Serum Sodium: 139 mmol/L (Rounded to 137 mmol/L) at 12/8/2019  4:23 AM  Total Bilirubin: 0.2 mg/dL (Rounded to 1 mg/dL) at 12/8/2019  4:23  AM  INR(ratio): 1.0 at 12/6/2019  5:39 PM  Age: 29 years    Significant Labs:  Labs within the past month have been reviewed.    Significant Imaging:  Labs: Reviewed    Assessment/Plan:     S/P liver transplant  Mr Diana is a 29 year old man with history of ETOH Hep s/p OLT 10/19/17, with post-transplant period notable for LAURA previously on HD, anastomotic stricture, ETOH relapse (6/2019), who is presenting to the hospital due n/v/d.    Recently admitted with abdominal pain, imaging ?enteritis with workup +ve giardia with improvement on treatment, but subsequent n/v/d and inability to tolerate PO. Imaging with ?increasing abdominal fluid collection (pending upload). Small volume elton-hepatic on RUQ.    Plan  - sirolimus:  Continue 1 mg daily.  - Giardia. Recent giardia diagnosis (11/28), unable to tolerate outpatient treatment. Will resume flagyl  - diarrhea. Reports improved since admission. Stool studies pending.  - LAURA. Had LAURA during recent admission which was improving, returning with Crt 1.6 -> 2.0, likely due to pre-renal. Stable. Avoid nephrotoxins.  - fluid collection. Will upload images from OSH for review.  Will ask IR to sample.  Continue antibiotics.  Appreciate ID input.  - umbilical hernia.  Soft reducible.  Will likely require outpatient intervention as has been increasing in size over time        Thank you for your consult. I will follow-up with patient. Please contact us if you have any additional questions.    William Kumar MD  Hepatology  [facility]

## 2019-12-09 NOTE — PLAN OF CARE
12/09/19 1342   Post-Acute Status   Post-Acute Authorization Other   Other Status No Post-Acute Service Needs   Discharge Delays None known at this time

## 2019-12-09 NOTE — SUBJECTIVE & OBJECTIVE
Interval History: Remains afebrile. Still w/ abd pain, mostly R-sided. States now has swelling of skin on his R-side. Diarrhea resolved. Able to tolerate food. Planned for paracentesis today.    Review of Systems   Constitutional: Negative for chills and fever.   HENT: Negative for trouble swallowing.    Eyes: Negative for photophobia.   Respiratory: Negative for shortness of breath.    Cardiovascular: Negative for chest pain and leg swelling.   Gastrointestinal: Positive for abdominal distention and abdominal pain. Negative for diarrhea, nausea and vomiting.   Genitourinary: Negative for dysuria.   Musculoskeletal: Negative for arthralgias and myalgias.   Skin: Negative for rash.   Neurological: Negative for headaches.   Psychiatric/Behavioral: Negative for agitation and confusion.     Objective:     Vital Signs (Most Recent):  Temp: 98.2 °F (36.8 °C) (12/09/19 1558)  Pulse: 88 (12/09/19 1558)  Resp: 18 (12/09/19 1558)  BP: (!) 142/82 (12/09/19 1558)  SpO2: 98 % (12/09/19 1558) Vital Signs (24h Range):  Temp:  [98.1 °F (36.7 °C)-99.2 °F (37.3 °C)] 98.2 °F (36.8 °C)  Pulse:  [82-92] 88  Resp:  [12-18] 18  SpO2:  [97 %-100 %] 98 %  BP: (129-147)/(74-87) 142/82     Weight: 65.1 kg (143 lb 8.3 oz)  Body mass index is 23.16 kg/m².    Estimated Creatinine Clearance: 60.9 mL/min (A) (based on SCr of 1.6 mg/dL (H)).    Physical Exam   Constitutional: He is oriented to person, place, and time. He appears well-developed. No distress.   HENT:   Head: Normocephalic and atraumatic.   Mouth/Throat: Oropharynx is clear and moist. No oropharyngeal exudate.   Eyes: Conjunctivae are normal. Right eye exhibits no discharge. Left eye exhibits no discharge. No scleral icterus.   Neck: Normal range of motion. Neck supple.   Cardiovascular: Normal rate, regular rhythm and intact distal pulses.   Pulmonary/Chest: Effort normal and breath sounds normal. No stridor. No respiratory distress. He has no wheezes. He has no rales.   Abdominal:  Soft. Bowel sounds are normal. He exhibits distension. There is tenderness. There is no guarding.   Reducible umbilical hernia  Induration of skin RLQ   Musculoskeletal: He exhibits no edema or tenderness.   Lymphadenopathy:     He has no cervical adenopathy.   Neurological: He is alert and oriented to person, place, and time.   Skin: Skin is warm. He is not diaphoretic.   Psychiatric: He has a normal mood and affect. His behavior is normal.   Nursing note and vitals reviewed.      Significant Labs:   Blood Culture:   Recent Labs   Lab 11/23/19 1953 11/27/19 0944 11/27/19 0954 12/06/19 1738 12/06/19 1739   LABBLOO No growth after 5 days.  No growth after 5 days. No growth after 5 days. No growth after 5 days. No Growth to date  No Growth to date  No Growth to date No Growth to date  No Growth to date  No Growth to date     CBC:   Recent Labs   Lab 12/08/19 0423 12/09/19  0438   WBC 3.87* 3.65*   HGB 6.5* 7.4*   HCT 21.8* 24.4*    155     CMP:   Recent Labs   Lab 12/08/19 0423 12/09/19  0438    140   K 4.0 3.9   * 111*   CO2 20* 21*   GLU 84 84   BUN 11 8   CREATININE 1.7* 1.6*   CALCIUM 7.7* 8.0*   PROT 6.6 6.8   ALBUMIN 2.3* 2.3*   BILITOT 0.2 0.3   ALKPHOS 114 122   AST 21 23   ALT 7* 8*   ANIONGAP 7* 8   EGFRNONAA 52.9* 57.0*     Microbiology Results (last 7 days)     Procedure Component Value Units Date/Time    Culture, Anaerobe [[id number]] Collected:  12/09/19 1636    Order Status:  Sent Specimen:  Body Fluid from Ascites Updated:  12/09/19 1636    Fungus culture [[id number]] Collected:  12/09/19 1636    Order Status:  Sent Specimen:  Body Fluid from Ascites Updated:  12/09/19 1636    AFB Culture & Smear [[id number]] Collected:  12/09/19 1636    Order Status:  Sent Specimen:  Body Fluid from Ascites Updated:  12/09/19 1636    Aerobic culture [[id number]] Collected:  12/09/19 1636    Order Status:  Sent Specimen:  Body Fluid from Ascites Updated:  12/09/19 1636    Blood culture  [[id number]] Collected:  12/06/19 1739    Order Status:  Completed Specimen:  Blood Updated:  12/09/19 0612     Blood Culture, Routine No Growth to date      No Growth to date      No Growth to date    Blood culture [[id number]] Collected:  12/06/19 1738    Order Status:  Completed Specimen:  Blood Updated:  12/09/19 0612     Blood Culture, Routine No Growth to date      No Growth to date      No Growth to date    Stool culture [[id number]]     Order Status:  No result Specimen:  Stool     Clostridium difficile EIA [[id number]]     Order Status:  Canceled Specimen:  Stool           Significant Imaging: I have reviewed all pertinent imaging results/findings within the past 24 hours.

## 2019-12-09 NOTE — PLAN OF CARE
Jhonny Angulo MD   [address] / [place] [address]       [id number] - [place], [place] - [address] AT [facility] [address]  [address]  [place] [address]  Phone: [phone] Fax: [phone]    [id number] - [place], [place] - [address]  [address]  [place] [address]  Phone: [phone] Fax: [phone]    [id number] - [place], [place] - [address] AT [facility] [address]  [address]  [place] [address]  Phone: [phone] Fax: [phone]    Payor: BLUE CROSS BLUE SHIELD / Plan: BCBS ALL OUT OF STATE / Product Type: PPO /         12/09/19 1338   Discharge Assessment   Assessment Type Discharge Planning Assessment   Confirmed/corrected address and phone number on facesheet? Yes   Assessment information obtained from? Patient   Expected Length of Stay (days) 3   Communicated expected length of stay with patient/caregiver yes   Prior to hospitilization cognitive status: Alert/Oriented   Prior to hospitalization functional status: Independent   Current cognitive status: Alert/Oriented   Current Functional Status: Independent   Lives With other (see comments)  (Pt staes he lives with 2 room mates.)   Able to Return to Prior Arrangements yes   Is patient able to care for self after discharge? Yes   Who are your caregiver(s) and their phone number(s)? Elvira Diana (mother) [phone]   Patient's perception of discharge disposition home or selfcare   Readmission Within the Last 30 Days current reason for admission unrelated to previous admission   If yes, most recent facility name: Recently discharged from [facility] on 12/2.   Patient currently being followed by outpatient case management? No   Patient currently receives any other outside agency services? No   Equipment Currently Used at Home none   Do you have any problems affording any of your prescribed medications? No   Is the patient taking  medications as prescribed? yes   Does the patient have transportation home? Yes   Transportation Anticipated family or friend will provide   Does the patient receive services at the [facility] Clinic? No   Discharge Plan A Home with family   DME Needed Upon Discharge  none   Patient/Family in Agreement with Plan yes   Readmission Questionnaire   At the time of your discharge, did someone talk to you about what your health problems were? Yes   At the time of discharge, did someone talk to you about what to watch out for regarding worsening of your health problem? Yes   At the time of discharge, did someone talk to you about what to do if you experienced worsening of your health problem? Yes   At the time of discharge, did someone talk to you about which medication to take when you left the hospital and which ones to stop taking? Yes   At the time of discharge, did someone talk to you about when and where to follow up with a doctor after you left the hospital? Yes   Do you have problems taking your medications as prescribed? No   Do you have any problems affording any of  your prescribed medications? No   Do you have problems obtaining/receiving your medications? No   Does the patient have transportation to healthcare appointments? Yes   Does the patient have family/friends to help with healtcare needs after discharge? yes

## 2019-12-09 NOTE — PROGRESS NOTES
Patient tolerated procedure well, 1500 ml clear yellow fluid removed, sample sent to lab, patient AAO, no s/s of distress, bandage to right abdomen CDI

## 2019-12-10 ENCOUNTER — CONFERENCE (OUTPATIENT)
Dept: TRANSPLANT | Facility: CLINIC | Age: 29
End: 2019-12-10

## 2019-12-10 LAB
ALBUMIN SERPL BCP-MCNC: 2.3 G/DL (ref 3.5–5.2)
ALP SERPL-CCNC: 127 U/L (ref 55–135)
ALT SERPL W/O P-5'-P-CCNC: 6 U/L (ref 10–44)
ANION GAP SERPL CALC-SCNC: 8 MMOL/L (ref 8–16)
AST SERPL-CCNC: 22 U/L (ref 10–40)
BASOPHILS # BLD AUTO: 0.03 K/UL (ref 0–0.2)
BASOPHILS NFR BLD: 0.7 % (ref 0–1.9)
BILIRUB SERPL-MCNC: 0.2 MG/DL (ref 0.1–1)
BUN SERPL-MCNC: 10 MG/DL (ref 6–20)
CALCIUM SERPL-MCNC: 7.8 MG/DL (ref 8.7–10.5)
CHLORIDE SERPL-SCNC: 107 MMOL/L (ref 95–110)
CO2 SERPL-SCNC: 22 MMOL/L (ref 23–29)
CREAT SERPL-MCNC: 1.8 MG/DL (ref 0.5–1.4)
DIFFERENTIAL METHOD: ABNORMAL
EOSINOPHIL # BLD AUTO: 0.1 K/UL (ref 0–0.5)
EOSINOPHIL NFR BLD: 1.8 % (ref 0–8)
ERYTHROCYTE [DISTWIDTH] IN BLOOD BY AUTOMATED COUNT: 16.1 % (ref 11.5–14.5)
EST. GFR  (AFRICAN AMERICAN): 57.1 ML/MIN/1.73 M^2
EST. GFR  (NON AFRICAN AMERICAN): 49.4 ML/MIN/1.73 M^2
GLUCOSE SERPL-MCNC: 86 MG/DL (ref 70–110)
HCT VFR BLD AUTO: 26.5 % (ref 40–54)
HGB BLD-MCNC: 8 G/DL (ref 14–18)
IMM GRANULOCYTES # BLD AUTO: 0.02 K/UL (ref 0–0.04)
IMM GRANULOCYTES NFR BLD AUTO: 0.5 % (ref 0–0.5)
LYMPHOCYTES # BLD AUTO: 1.4 K/UL (ref 1–4.8)
LYMPHOCYTES NFR BLD: 30.9 % (ref 18–48)
MAGNESIUM SERPL-MCNC: 1.9 MG/DL (ref 1.6–2.6)
MCH RBC QN AUTO: 26.9 PG (ref 27–31)
MCHC RBC AUTO-ENTMCNC: 30.2 G/DL (ref 32–36)
MCV RBC AUTO: 89 FL (ref 82–98)
MONOCYTES # BLD AUTO: 0.7 K/UL (ref 0.3–1)
MONOCYTES NFR BLD: 16.2 % (ref 4–15)
NEUTROPHILS # BLD AUTO: 2.2 K/UL (ref 1.8–7.7)
NEUTROPHILS NFR BLD: 49.9 % (ref 38–73)
NRBC BLD-RTO: 0 /100 WBC
PHOSPHATE SERPL-MCNC: 3.9 MG/DL (ref 2.7–4.5)
PLATELET # BLD AUTO: 163 K/UL (ref 150–350)
PMV BLD AUTO: 9.8 FL (ref 9.2–12.9)
POTASSIUM SERPL-SCNC: 4.1 MMOL/L (ref 3.5–5.1)
PROT SERPL-MCNC: 6.8 G/DL (ref 6–8.4)
RBC # BLD AUTO: 2.97 M/UL (ref 4.6–6.2)
SIROLIMUS BLD-MCNC: 3.6 NG/ML (ref 4–20)
SODIUM SERPL-SCNC: 137 MMOL/L (ref 136–145)
WBC # BLD AUTO: 4.37 K/UL (ref 3.9–12.7)

## 2019-12-10 PROCEDURE — 25000003 PHARM REV CODE 250: Performed by: HOSPITALIST

## 2019-12-10 PROCEDURE — 36415 COLL VENOUS BLD VENIPUNCTURE: CPT

## 2019-12-10 PROCEDURE — 63600175 PHARM REV CODE 636 W HCPCS: Performed by: HOSPITALIST

## 2019-12-10 PROCEDURE — 20600001 HC STEP DOWN PRIVATE ROOM

## 2019-12-10 PROCEDURE — 99233 SBSQ HOSP IP/OBS HIGH 50: CPT | Mod: ,,, | Performed by: INTERNAL MEDICINE

## 2019-12-10 PROCEDURE — 80053 COMPREHEN METABOLIC PANEL: CPT

## 2019-12-10 PROCEDURE — 80195 ASSAY OF SIROLIMUS: CPT

## 2019-12-10 PROCEDURE — 83735 ASSAY OF MAGNESIUM: CPT

## 2019-12-10 PROCEDURE — 99233 PR SUBSEQUENT HOSPITAL CARE,LEVL III: ICD-10-PCS | Mod: ,,, | Performed by: INTERNAL MEDICINE

## 2019-12-10 PROCEDURE — 84100 ASSAY OF PHOSPHORUS: CPT

## 2019-12-10 PROCEDURE — 99232 PR SUBSEQUENT HOSPITAL CARE,LEVL II: ICD-10-PCS | Mod: ,,, | Performed by: HOSPITALIST

## 2019-12-10 PROCEDURE — 85025 COMPLETE CBC W/AUTO DIFF WBC: CPT

## 2019-12-10 PROCEDURE — 99232 SBSQ HOSP IP/OBS MODERATE 35: CPT | Mod: ,,, | Performed by: HOSPITALIST

## 2019-12-10 PROCEDURE — 63600175 PHARM REV CODE 636 W HCPCS: Performed by: INTERNAL MEDICINE

## 2019-12-10 RX ORDER — CEFPODOXIME PROXETIL 200 MG/1
200 TABLET, FILM COATED ORAL EVERY 12 HOURS
Status: DISCONTINUED | OUTPATIENT
Start: 2019-12-10 | End: 2019-12-11 | Stop reason: HOSPADM

## 2019-12-10 RX ORDER — VALACYCLOVIR HYDROCHLORIDE 500 MG/1
1000 TABLET, FILM COATED ORAL 3 TIMES DAILY
Status: DISCONTINUED | OUTPATIENT
Start: 2019-12-10 | End: 2019-12-11 | Stop reason: HOSPADM

## 2019-12-10 RX ORDER — AMOXICILLIN 250 MG
1 CAPSULE ORAL 2 TIMES DAILY
Status: DISCONTINUED | OUTPATIENT
Start: 2019-12-10 | End: 2019-12-11 | Stop reason: HOSPADM

## 2019-12-10 RX ORDER — METRONIDAZOLE 500 MG/1
500 TABLET ORAL EVERY 8 HOURS
Status: DISCONTINUED | OUTPATIENT
Start: 2019-12-10 | End: 2019-12-11

## 2019-12-10 RX ORDER — LIDOCAINE 50 MG/G
1 PATCH TOPICAL
Status: DISCONTINUED | OUTPATIENT
Start: 2019-12-10 | End: 2019-12-11 | Stop reason: HOSPADM

## 2019-12-10 RX ADMIN — SENNOSIDES AND DOCUSATE SODIUM 1 TABLET: 8.6; 5 TABLET ORAL at 03:12

## 2019-12-10 RX ADMIN — POLYETHYLENE GLYCOL 3350 17 G: 17 POWDER, FOR SOLUTION ORAL at 08:12

## 2019-12-10 RX ADMIN — VALACYCLOVIR HYDROCHLORIDE 1000 MG: 500 TABLET, FILM COATED ORAL at 08:12

## 2019-12-10 RX ADMIN — DICYCLOMINE HYDROCHLORIDE 10 MG: 10 CAPSULE ORAL at 01:12

## 2019-12-10 RX ADMIN — HYDROMORPHONE HYDROCHLORIDE 1 MG: 1 INJECTION, SOLUTION INTRAMUSCULAR; INTRAVENOUS; SUBCUTANEOUS at 07:12

## 2019-12-10 RX ADMIN — PANTOPRAZOLE SODIUM 40 MG: 40 TABLET, DELAYED RELEASE ORAL at 10:12

## 2019-12-10 RX ADMIN — POLYETHYLENE GLYCOL 3350 17 G: 17 POWDER, FOR SOLUTION ORAL at 10:12

## 2019-12-10 RX ADMIN — HYDROMORPHONE HYDROCHLORIDE 1 MG: 1 INJECTION, SOLUTION INTRAMUSCULAR; INTRAVENOUS; SUBCUTANEOUS at 05:12

## 2019-12-10 RX ADMIN — OXYCODONE HYDROCHLORIDE 15 MG: 10 TABLET ORAL at 02:12

## 2019-12-10 RX ADMIN — HYDROMORPHONE HYDROCHLORIDE 1 MG: 1 INJECTION, SOLUTION INTRAMUSCULAR; INTRAVENOUS; SUBCUTANEOUS at 11:12

## 2019-12-10 RX ADMIN — OXYCODONE HYDROCHLORIDE 15 MG: 10 TABLET ORAL at 10:12

## 2019-12-10 RX ADMIN — METRONIDAZOLE 500 MG: 500 TABLET ORAL at 01:12

## 2019-12-10 RX ADMIN — DICYCLOMINE HYDROCHLORIDE 10 MG: 10 CAPSULE ORAL at 10:12

## 2019-12-10 RX ADMIN — HYDROMORPHONE HYDROCHLORIDE 1 MG: 1 INJECTION, SOLUTION INTRAMUSCULAR; INTRAVENOUS; SUBCUTANEOUS at 01:12

## 2019-12-10 RX ADMIN — SIROLIMUS 1 MG: 1 TABLET ORAL at 10:12

## 2019-12-10 RX ADMIN — SENNOSIDES AND DOCUSATE SODIUM 1 TABLET: 8.6; 5 TABLET ORAL at 08:12

## 2019-12-10 RX ADMIN — OXYCODONE HYDROCHLORIDE 15 MG: 10 TABLET ORAL at 06:12

## 2019-12-10 RX ADMIN — METRONIDAZOLE 500 MG: 500 TABLET ORAL at 06:12

## 2019-12-10 RX ADMIN — OXYCODONE HYDROCHLORIDE 15 MG: 10 TABLET ORAL at 03:12

## 2019-12-10 RX ADMIN — VALACYCLOVIR HYDROCHLORIDE 1000 MG: 500 TABLET, FILM COATED ORAL at 03:12

## 2019-12-10 RX ADMIN — CEFPODOXIME PROXETIL 200 MG: 200 TABLET, FILM COATED ORAL at 09:12

## 2019-12-10 RX ADMIN — LIDOCAINE 1 PATCH: 50 PATCH TOPICAL at 03:12

## 2019-12-10 RX ADMIN — OXYCODONE HYDROCHLORIDE 15 MG: 10 TABLET ORAL at 08:12

## 2019-12-10 RX ADMIN — METRONIDAZOLE 500 MG: 500 TABLET ORAL at 09:12

## 2019-12-10 NOTE — PLAN OF CARE
Problem: Adult Inpatient Plan of Care  Goal: Plan of Care Review  Outcome: Ongoing, Progressing  Flowsheets (Taken 12/9/2019 2015)  Plan of Care Reviewed With: patient; family   AAOx4, VSS. Up independently, remains free of falls. Patient to IR for Paracentesis, 1.5L removed per IR note. PRN pain medication utilized ATC for management and prior to activity. Mg Sulfate given x 1 for Mg 1.5. No evidence of distress, safety maintained, hourly rounding performed. Will continue to monitor.

## 2019-12-10 NOTE — SUBJECTIVE & OBJECTIVE
Interval History: pt seen, feeling better, still some discomfort in belly, diarrhea better, n fevers, seen walking in hallway today    Review of Systems   Constitutional: Negative for chills and fever.   HENT: Negative for trouble swallowing.    Eyes: Negative for photophobia.   Respiratory: Negative for shortness of breath.    Cardiovascular: Negative for chest pain and leg swelling.   Gastrointestinal: Positive for abdominal distention and abdominal pain. Negative for diarrhea, nausea and vomiting.   Genitourinary: Negative for dysuria.   Musculoskeletal: Negative for arthralgias and myalgias.   Skin: Negative for rash.   Neurological: Negative for headaches.   Psychiatric/Behavioral: Negative for agitation and confusion.     Objective:     Vital Signs (Most Recent):  Temp: 98.6 °F (37 °C) (12/10/19 1157)  Pulse: 96 (12/10/19 1157)  Resp: 18 (12/10/19 1157)  BP: 130/78 (12/10/19 1157)  SpO2: 99 % (12/10/19 1157) Vital Signs (24h Range):  Temp:  [98.1 °F (36.7 °C)-99.1 °F (37.3 °C)] 98.6 °F (37 °C)  Pulse:  [87-96] 96  Resp:  [16-20] 18  SpO2:  [98 %-100 %] 99 %  BP: (129-142)/(72-83) 130/78     Weight: 65.1 kg (143 lb 8.3 oz)  Body mass index is 23.16 kg/m².    Estimated Creatinine Clearance: 54.2 mL/min (A) (based on SCr of 1.8 mg/dL (H)).    Physical Exam   Constitutional: He is oriented to person, place, and time. He appears well-developed and well-nourished. No distress.   HENT:   Head: Normocephalic and atraumatic.   Mouth/Throat: Oropharynx is clear and moist. No oropharyngeal exudate.   Eyes: Conjunctivae are normal. Right eye exhibits no discharge. Left eye exhibits no discharge. No scleral icterus.   Neck: Normal range of motion. Neck supple.   Cardiovascular: Normal rate, regular rhythm and intact distal pulses.   Pulmonary/Chest: Effort normal and breath sounds normal. No stridor. No respiratory distress. He has no wheezes. He has no rales.   Abdominal: Soft. Bowel sounds are normal. He exhibits  distension. There is tenderness. There is no guarding.   Reducible umbilical hernia, small wound seen on top  Large scar juan miguel across abdomen. Below it vesicular lesions noted noted with some crusted going to right quadrant   Musculoskeletal: He exhibits no edema or tenderness.   Lymphadenopathy:     He has no cervical adenopathy.   Neurological: He is alert and oriented to person, place, and time.   Skin: Skin is warm. He is not diaphoretic.   Psychiatric: He has a normal mood and affect. His behavior is normal.   Nursing note and vitals reviewed.      Significant Labs:   CBC:   Recent Labs   Lab 12/09/19  0438 12/10/19  0409   WBC 3.65* 4.37   HGB 7.4* 8.0*   HCT 24.4* 26.5*    163     CMP:   Recent Labs   Lab 12/09/19  0438 12/10/19  0409    137   K 3.9 4.1   * 107   CO2 21* 22*   GLU 84 86   BUN 8 10   CREATININE 1.6* 1.8*   CALCIUM 8.0* 7.8*   PROT 6.8 6.8   ALBUMIN 2.3* 2.3*   BILITOT 0.3 0.2   ALKPHOS 122 127   AST 23 22   ALT 8* 6*   ANIONGAP 8 8   EGFRNONAA 57.0* 49.4*     Microbiology Results (last 7 days)     Procedure Component Value Units Date/Time    AFB Culture & Smear [[id number]] Collected:  12/09/19 1636    Order Status:  Completed Specimen:  Body Fluid from Ascites Updated:  12/10/19 1315     AFB CULTURE STAIN No acid fast bacilli seen.    Aerobic culture [[id number]] Collected:  12/09/19 1636    Order Status:  Completed Specimen:  Body Fluid from Ascites Updated:  12/10/19 0726     Aerobic Bacterial Culture No growth    Culture, Anaerobe [[id number]] Collected:  12/09/19 1636    Order Status:  Completed Specimen:  Body Fluid from Ascites Updated:  12/10/19 0638     Anaerobic Culture Culture in progress    Blood culture [[id number]] Collected:  12/06/19 1739    Order Status:  Completed Specimen:  Blood Updated:  12/10/19 0612     Blood Culture, Routine No Growth to date      No Growth to date      No Growth to date      No Growth to date    Blood culture [[id number]] Collected:   12/06/19 1738    Order Status:  Completed Specimen:  Blood Updated:  12/10/19 0612     Blood Culture, Routine No Growth to date      No Growth to date      No Growth to date      No Growth to date    Fungus culture [[id number]] Collected:  12/09/19 1636    Order Status:  Sent Specimen:  Body Fluid from Ascites Updated:  12/09/19 2005    Stool culture [[id number]]     Order Status:  Canceled Specimen:  Stool     Clostridium difficile EIA [[id number]]     Order Status:  Canceled Specimen:  Stool           Significant Imaging: I have reviewed all pertinent imaging results/findings within the past 24 hours.

## 2019-12-10 NOTE — ASSESSMENT & PLAN NOTE
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 S/P parcentesis 12/9 with 679 WBC and 27%segs 1500ml clear fluid taken.  Was on cipro and Flagyl changed to CFTX and Flagyl 12/10 by team. CMV PCR undetected    Erythema on lower abdomen more like VZ rash  Recommendations:  -- Ceftriaxone can be stopped and switch to cepodoxime PO for 5 days for ascites  -- Can complete 10 days of total therapy with Metronidazole for Giardia  -- lower abdomen lesions now are more vesicular and crusting- more like VZ rash- Would recommend 7 days of Valcyte.  -- Unlikely CMV is the cause- noted CMV PCR result

## 2019-12-10 NOTE — SUBJECTIVE & OBJECTIVE
Interval History:  Patient mainly complaining about pruritus and pain in his right lower quadrant.  Evaluated by ID with concern for shingles.  Remains afebrile.  Creatinine remains at baseline  Paracentesis done negative for SBP.  Discussed case at IR conference today, fluid collection thought to be due to diffuse ascites caused by enteritis    Current Facility-Administered Medications   Medication    acetaminophen tablet 650 mg    cefpodoxime tablet 200 mg    dextrose 10% (D10W) Bolus    dextrose 10% (D10W) Bolus    ergocalciferol capsule 50,000 Units    glucagon (human recombinant) injection 1 mg    glucose chewable tablet 16 g    glucose chewable tablet 24 g    HYDROmorphone injection 1 mg    lidocaine 5 % patch 1 patch    metroNIDAZOLE tablet 500 mg    ondansetron injection 8 mg    oxyCODONE immediate release tablet 15 mg    pantoprazole EC tablet 40 mg    polyethylene glycol packet 17 g    promethazine (PHENERGAN) 25 mg in dextrose 5 % 50 mL IVPB    senna-docusate 8.6-50 mg per tablet 1 tablet    sirolimus tablet 1 mg    valACYclovir tablet 1,000 mg       Objective:     Vital Signs (Most Recent):  Temp: 98.6 °F (37 °C) (12/10/19 1157)  Pulse: 96 (12/10/19 1157)  Resp: 18 (12/10/19 1157)  BP: 130/78 (12/10/19 1157)  SpO2: 99 % (12/10/19 1157) Vital Signs (24h Range):  Temp:  [98.1 °F (36.7 °C)-99.1 °F (37.3 °C)] 98.6 °F (37 °C)  Pulse:  [87-96] 96  Resp:  [16-20] 18  SpO2:  [98 %-100 %] 99 %  BP: (129-142)/(72-83) 130/78     Weight: 65.1 kg (143 lb 8.3 oz) (12/06/19 1951)  Body mass index is 23.16 kg/m².    Physical Exam   Constitutional: He is oriented to person, place, and time. No distress.   HENT:   Head: Normocephalic.   Eyes: Conjunctivae are normal. No scleral icterus.   Neck: Normal range of motion. Neck supple.   Cardiovascular: Normal rate and regular rhythm.   Pulmonary/Chest: Effort normal and breath sounds normal.   Abdominal: Soft. Bowel sounds are normal. He exhibits no  distension and no mass. There is no tenderness. There is no guarding.   Skin rash over the right lower quadrant.  Erythema and signs of excoriation.   Musculoskeletal: Normal range of motion.   Neurological: He is alert and oriented to person, place, and time.   Skin: Skin is warm and dry.   Psychiatric: He has a normal mood and affect.       MELD-Na score: 12 at 12/8/2019  4:23 AM  MELD score: 12 at 12/8/2019  4:23 AM  Calculated from:  Serum Creatinine: 1.7 mg/dL at 12/8/2019  4:23 AM  Serum Sodium: 139 mmol/L (Rounded to 137 mmol/L) at 12/8/2019  4:23 AM  Total Bilirubin: 0.2 mg/dL (Rounded to 1 mg/dL) at 12/8/2019  4:23 AM  INR(ratio): 1.0 at 12/6/2019  5:39 PM  Age: 29 years    Significant Labs:  CBC:   Recent Labs   Lab 12/10/19  0409   WBC 4.37   RBC 2.97*   HGB 8.0*   HCT 26.5*        BMP:   Recent Labs   Lab 12/10/19  0409   GLU 86      K 4.1      CO2 22*   BUN 10   CREATININE 1.8*   CALCIUM 7.8*     CMP:   Recent Labs   Lab 12/10/19  0409   GLU 86   CALCIUM 7.8*   ALBUMIN 2.3*   PROT 6.8      K 4.1   CO2 22*      BUN 10   CREATININE 1.8*   ALKPHOS 127   ALT 6*   AST 22   BILITOT 0.2     Coagulation:   Recent Labs   Lab 12/06/19  1739   INR 1.0       Significant Imaging:  Labs: Reviewed  US: Reviewed  CT: Reviewed

## 2019-12-10 NOTE — PLAN OF CARE
Problem: Adult Inpatient Plan of Care  Goal: Plan of Care Review  Outcome: Ongoing, Progressing  Flowsheets (Taken 12/10/2019 0320)  Plan of Care Reviewed With: patient; mother    VSS. AAO x 4. Ambulates independently. Para on dayshift.  C/O pain to abdomen , PRN oxycodone and Dilaudid administered . Dressing to RLQ CDI. No BM this shift unable to collect Stool sample. Hat and sterile container in pts bathroom with instruction to notify staff of BM. Nose bleed x 1, bleeding subsided quickly with pressure applied. New order Rocephin administered. Callbell placed within reach and use encouraged. Family at bedside.

## 2019-12-10 NOTE — PROGRESS NOTES
[facility]  Infectious Disease  Progress Note    Patient Name: Jhonny Diana  MRN: [id number]  Admission Date: 12/6/2019  Length of Stay: 4 days  Attending Physician: Marylou Boggs MD  Primary Care Provider: Jhonny Angulo MD    Isolation Status: Airborne and Special Contact  Assessment/Plan:      S/P liver transplant  28 y/o M PMhx alcoholic cirrhosis s/p DDLT 10/19/2017 (CMV D+/R+, steroid induction, on maintenance sirolimus) c/b seizures, LAURA, superificial wound infection s/p wound vac closure, culture negative peritonitis due to suspected indolent biliary leakage s/p repeated sphincterotomy/biliary stent placement, subsequent loculated RLQ collection s/p perc-drainage and prolonged empiric antibiotics course, gastritis/duodenal ulcer 3/6/2018, and 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 S/P parcentesis 12/9 with 679 WBC and 27%segs 1500ml clear fluid taken.  Was on cipro and Flagyl changed to CFTX and Flagyl 12/10 by team. CMV PCR undetected    Erythema on lower abdomen more like VZ rash  Recommendations:  -- Ceftriaxone can be stopped and switch to cepodoxime PO for 5 days for ascites  -- Can complete 10 days of total therapy with Metronidazole for Giardia  -- lower abdomen lesions now are more vesicular and crusting- more like VZ rash- Would recommend 7 days of Valcyte.  -- Unlikely CMV is the cause- noted CMV PCR result       Case discussed with Dr Osborn. ID will sign off right now    Anticipated Disposition: as per primary team    Thank you for your consult. I will sign off. Please contact us if you have any additional  questions.    Sushant Baron MD   ID Fellow  Infectious Disease  [facility]    Subjective:     Principal Problem:Enteritis of infectious origin    HPI: No notes on file  Interval History: pt seen, feeling better, still some discomfort in belly, diarrhea better, n fevers, seen walking in hallway today    Review of Systems   Constitutional: Negative for chills and fever.   HENT: Negative for trouble swallowing.    Eyes: Negative for photophobia.   Respiratory: Negative for shortness of breath.    Cardiovascular: Negative for chest pain and leg swelling.   Gastrointestinal: Positive for abdominal distention and abdominal pain. Negative for diarrhea, nausea and vomiting.   Genitourinary: Negative for dysuria.   Musculoskeletal: Negative for arthralgias and myalgias.   Skin: Negative for rash.   Neurological: Negative for headaches.   Psychiatric/Behavioral: Negative for agitation and confusion.     Objective:     Vital Signs (Most Recent):  Temp: 98.6 °F (37 °C) (12/10/19 1157)  Pulse: 96 (12/10/19 1157)  Resp: 18 (12/10/19 1157)  BP: 130/78 (12/10/19 1157)  SpO2: 99 % (12/10/19 1157) Vital Signs (24h Range):  Temp:  [98.1 °F (36.7 °C)-99.1 °F (37.3 °C)] 98.6 °F (37 °C)  Pulse:  [87-96] 96  Resp:  [16-20] 18  SpO2:  [98 %-100 %] 99 %  BP: (129-142)/(72-83) 130/78     Weight: 65.1 kg (143 lb 8.3 oz)  Body mass index is 23.16 kg/m².    Estimated Creatinine Clearance: 54.2 mL/min (A) (based on SCr of 1.8 mg/dL (H)).    Physical Exam   Constitutional: He is oriented to person, place, and time. He appears well-developed and well-nourished. No distress.   HENT:   Head: Normocephalic and atraumatic.   Mouth/Throat: Oropharynx is clear and moist. No oropharyngeal exudate.   Eyes: Conjunctivae are normal. Right eye exhibits no discharge. Left eye exhibits no discharge. No scleral icterus.   Neck: Normal range of motion. Neck supple.   Cardiovascular: Normal rate, regular rhythm and intact distal pulses.    Pulmonary/Chest: Effort normal and breath sounds normal. No stridor. No respiratory distress. He has no wheezes. He has no rales.   Abdominal: Soft. Bowel sounds are normal. He exhibits distension. There is tenderness. There is no guarding.   Reducible umbilical hernia, small wound seen on top  Large scar juan miguel across abdomen. Below it vesicular lesions noted noted with some crusted going to right quadrant   Musculoskeletal: He exhibits no edema or tenderness.   Lymphadenopathy:     He has no cervical adenopathy.   Neurological: He is alert and oriented to person, place, and time.   Skin: Skin is warm. He is not diaphoretic.   Psychiatric: He has a normal mood and affect. His behavior is normal.   Nursing note and vitals reviewed.      Significant Labs:   CBC:   Recent Labs   Lab 12/09/19  0438 12/10/19  0409   WBC 3.65* 4.37   HGB 7.4* 8.0*   HCT 24.4* 26.5*    163     CMP:   Recent Labs   Lab 12/09/19  0438 12/10/19  0409    137   K 3.9 4.1   * 107   CO2 21* 22*   GLU 84 86   BUN 8 10   CREATININE 1.6* 1.8*   CALCIUM 8.0* 7.8*   PROT 6.8 6.8   ALBUMIN 2.3* 2.3*   BILITOT 0.3 0.2   ALKPHOS 122 127   AST 23 22   ALT 8* 6*   ANIONGAP 8 8   EGFRNONAA 57.0* 49.4*     Microbiology Results (last 7 days)     Procedure Component Value Units Date/Time    AFB Culture & Smear [[id number]] Collected:  12/09/19 1636    Order Status:  Completed Specimen:  Body Fluid from Ascites Updated:  12/10/19 1315     AFB CULTURE STAIN No acid fast bacilli seen.    Aerobic culture [[id number]] Collected:  12/09/19 1636    Order Status:  Completed Specimen:  Body Fluid from Ascites Updated:  12/10/19 0726     Aerobic Bacterial Culture No growth    Culture, Anaerobe [[id number]] Collected:  12/09/19 1636    Order Status:  Completed Specimen:  Body Fluid from Ascites Updated:  12/10/19 0638     Anaerobic Culture Culture in progress    Blood culture [[id number]] Collected:  12/06/19 4419    Order Status:  Completed Specimen:   Blood Updated:  12/10/19 0612     Blood Culture, Routine No Growth to date      No Growth to date      No Growth to date      No Growth to date    Blood culture [[id number]] Collected:  12/06/19 1738    Order Status:  Completed Specimen:  Blood Updated:  12/10/19 0612     Blood Culture, Routine No Growth to date      No Growth to date      No Growth to date      No Growth to date    Fungus culture [[id number]] Collected:  12/09/19 1636    Order Status:  Sent Specimen:  Body Fluid from Ascites Updated:  12/09/19 2005    Stool culture [[id number]]     Order Status:  Canceled Specimen:  Stool     Clostridium difficile EIA [[id number]]     Order Status:  Canceled Specimen:  Stool           Significant Imaging: I have reviewed all pertinent imaging results/findings within the past 24 hours.

## 2019-12-10 NOTE — ASSESSMENT & PLAN NOTE
Mr Diana is a 29 year old man with history of ETOH Hep s/p OLT 10/19/17, with post-transplant period notable for LAURA previously on HD, anastomotic stricture, ETOH relapse (6/2019), who is presenting to the hospital due n/v/d.    Recently admitted with abdominal pain, imaging with enteritis with workup +ve giardia with improvement on treatment, but subsequent n/v/d and inability to tolerate PO.  Imaging reviewed at our conference, fluid collection noted previously use that to be diffuse ascites. This has been tapped and negative for SBP.    Plan  -concern for shingles, appreciate ID recommendations.  - Sirolimus:  Continue 1 mg daily.  Might need to come down on does if shingles is confirmed.  - Giardia/enteritis. Recent giardia diagnosis (11/28), id following.  Continue antibiotics.  - diarrhea. resolved  - umbilical hernia.  Soft reducible.  Will require outpatient intervention as has been increasing in size over time

## 2019-12-10 NOTE — PROGRESS NOTES
[facility]  Hepatology  Progress Note    Patient Name: Jhonny Diana  MRN: [id number]  Admission Date: 12/6/2019  Hospital Length of Stay: 4 days  Attending Provider: Marylou Boggs MD   Primary Care Physician: Jhonny Angulo MD  Principal Problem:Enteritis of infectious origin    Subjective:     Transplant status: Post-transplant    HPI: Mr Diana is a 29 year old man with history of ETOH Hep s/p OLT 10/19/17, with post-transplant period notable for LAURA previously on HD, anastomotic stricture, ETOH relapse (6/2019), who is presenting to the hospital due n/v/d.    He was recently admitted (11/22) due to concern for acute on chronic abdominal pain, fever, and imaging at OSH concerning for enteritis. He was hospitalized at OSH with concern for enteritis, with antibiotics and subsequently presented to [facility] for further workup. While admitted, stool O&P was notable for giardia with improvement in his symptoms with antibiotics and was discharged.    He notes he was initially doing well after leaving the hospital but developed nausea, vomiting and diarrhea and inability to tolerate PO and medications therefore presented to the hospital. Was unable to tolerate his flagyl but was able to take his sirolimus. Denies f/c/s.  Endorses diffuse abdominal pain (predominantly RUQ) which is slightly worse than baseline. CT scan was concerning for worsening abdominal fluid (OSH, pending upload).      Interval History:  Patient mainly complaining about pruritus and pain in his right lower quadrant.  Evaluated by ID with concern for shingles.  Remains afebrile.  Creatinine remains at baseline  Paracentesis done negative for SBP.  Discussed case at IR conference today, fluid collection thought to be due to diffuse ascites caused by enteritis    Current Facility-Administered Medications   Medication    acetaminophen tablet 650 mg    cefpodoxime tablet 200 mg    dextrose 10% (D10W) Bolus    dextrose 10% (D10W) Bolus     ergocalciferol capsule 50,000 Units    glucagon (human recombinant) injection 1 mg    glucose chewable tablet 16 g    glucose chewable tablet 24 g    HYDROmorphone injection 1 mg    lidocaine 5 % patch 1 patch    metroNIDAZOLE tablet 500 mg    ondansetron injection 8 mg    oxyCODONE immediate release tablet 15 mg    pantoprazole EC tablet 40 mg    polyethylene glycol packet 17 g    promethazine (PHENERGAN) 25 mg in dextrose 5 % 50 mL IVPB    senna-docusate 8.6-50 mg per tablet 1 tablet    sirolimus tablet 1 mg    valACYclovir tablet 1,000 mg       Objective:     Vital Signs (Most Recent):  Temp: 98.6 °F (37 °C) (12/10/19 1157)  Pulse: 96 (12/10/19 1157)  Resp: 18 (12/10/19 1157)  BP: 130/78 (12/10/19 1157)  SpO2: 99 % (12/10/19 1157) Vital Signs (24h Range):  Temp:  [98.1 °F (36.7 °C)-99.1 °F (37.3 °C)] 98.6 °F (37 °C)  Pulse:  [87-96] 96  Resp:  [16-20] 18  SpO2:  [98 %-100 %] 99 %  BP: (129-142)/(72-83) 130/78     Weight: 65.1 kg (143 lb 8.3 oz) (12/06/19 1951)  Body mass index is 23.16 kg/m².    Physical Exam   Constitutional: He is oriented to person, place, and time. No distress.   HENT:   Head: Normocephalic.   Eyes: Conjunctivae are normal. No scleral icterus.   Neck: Normal range of motion. Neck supple.   Cardiovascular: Normal rate and regular rhythm.   Pulmonary/Chest: Effort normal and breath sounds normal.   Abdominal: Soft. Bowel sounds are normal. He exhibits no distension and no mass. There is no tenderness. There is no guarding.   Skin rash over the right lower quadrant.  Erythema and signs of excoriation.   Musculoskeletal: Normal range of motion.   Neurological: He is alert and oriented to person, place, and time.   Skin: Skin is warm and dry.   Psychiatric: He has a normal mood and affect.       MELD-Na score: 12 at 12/8/2019  4:23 AM  MELD score: 12 at 12/8/2019  4:23 AM  Calculated from:  Serum Creatinine: 1.7 mg/dL at 12/8/2019  4:23 AM  Serum Sodium: 139 mmol/L (Rounded to  137 mmol/L) at 12/8/2019  4:23 AM  Total Bilirubin: 0.2 mg/dL (Rounded to 1 mg/dL) at 12/8/2019  4:23 AM  INR(ratio): 1.0 at 12/6/2019  5:39 PM  Age: 29 years    Significant Labs:  CBC:   Recent Labs   Lab 12/10/19  0409   WBC 4.37   RBC 2.97*   HGB 8.0*   HCT 26.5*        BMP:   Recent Labs   Lab 12/10/19  0409   GLU 86      K 4.1      CO2 22*   BUN 10   CREATININE 1.8*   CALCIUM 7.8*     CMP:   Recent Labs   Lab 12/10/19  0409   GLU 86   CALCIUM 7.8*   ALBUMIN 2.3*   PROT 6.8      K 4.1   CO2 22*      BUN 10   CREATININE 1.8*   ALKPHOS 127   ALT 6*   AST 22   BILITOT 0.2     Coagulation:   Recent Labs   Lab 12/06/19  1739   INR 1.0       Significant Imaging:  Labs: Reviewed  US: Reviewed  CT: Reviewed    Assessment/Plan:     S/P liver transplant  Mr Diana is a 29 year old man with history of ETOH Hep s/p OLT 10/19/17, with post-transplant period notable for LAURA previously on HD, anastomotic stricture, ETOH relapse (6/2019), who is presenting to the hospital due n/v/d.    Recently admitted with abdominal pain, imaging with enteritis with workup +ve giardia with improvement on treatment, but subsequent n/v/d and inability to tolerate PO.  Imaging reviewed at our conference, fluid collection noted previously use that to be diffuse ascites. This has been tapped and negative for SBP.    Plan  -concern for shingles, appreciate ID recommendations.  - Sirolimus:  Continue 1 mg daily.  Might need to come down on does if shingles is confirmed.  - Giardia/enteritis. Recent giardia diagnosis (11/28), id following.  Continue antibiotics.  - diarrhea. resolved  - umbilical hernia.  Soft reducible.  Will require outpatient intervention as has been increasing in size over time        Thank you for your consult. I will follow-up with patient. Please contact us if you have any additional questions.    Jadiel Barrios MD  Hepatology  [facility]

## 2019-12-11 ENCOUNTER — TELEPHONE (OUTPATIENT)
Dept: TRANSPLANT | Facility: CLINIC | Age: 29
End: 2019-12-11

## 2019-12-11 VITALS
HEIGHT: 66 IN | BODY MASS INDEX: 23.06 KG/M2 | SYSTOLIC BLOOD PRESSURE: 115 MMHG | WEIGHT: 143.5 LBS | TEMPERATURE: 99 F | OXYGEN SATURATION: 98 % | RESPIRATION RATE: 18 BRPM | DIASTOLIC BLOOD PRESSURE: 80 MMHG | HEART RATE: 90 BPM

## 2019-12-11 PROBLEM — B02.9 HERPES ZOSTER WITHOUT COMPLICATION: Status: ACTIVE | Noted: 2019-12-11

## 2019-12-11 LAB
ALBUMIN SERPL BCP-MCNC: 2.6 G/DL (ref 3.5–5.2)
ALP SERPL-CCNC: 139 U/L (ref 55–135)
ALT SERPL W/O P-5'-P-CCNC: 7 U/L (ref 10–44)
ANION GAP SERPL CALC-SCNC: 10 MMOL/L (ref 8–16)
AST SERPL-CCNC: 22 U/L (ref 10–40)
BASOPHILS # BLD AUTO: 0.03 K/UL (ref 0–0.2)
BASOPHILS NFR BLD: 0.5 % (ref 0–1.9)
BILIRUB SERPL-MCNC: 0.3 MG/DL (ref 0.1–1)
BUN SERPL-MCNC: 12 MG/DL (ref 6–20)
CALCIUM SERPL-MCNC: 8.4 MG/DL (ref 8.7–10.5)
CHLORIDE SERPL-SCNC: 108 MMOL/L (ref 95–110)
CO2 SERPL-SCNC: 21 MMOL/L (ref 23–29)
CREAT SERPL-MCNC: 1.9 MG/DL (ref 0.5–1.4)
DIFFERENTIAL METHOD: ABNORMAL
EOSINOPHIL # BLD AUTO: 0.1 K/UL (ref 0–0.5)
EOSINOPHIL NFR BLD: 1.9 % (ref 0–8)
ERYTHROCYTE [DISTWIDTH] IN BLOOD BY AUTOMATED COUNT: 16.4 % (ref 11.5–14.5)
EST. GFR  (AFRICAN AMERICAN): 53.5 ML/MIN/1.73 M^2
EST. GFR  (NON AFRICAN AMERICAN): 46.3 ML/MIN/1.73 M^2
GLUCOSE SERPL-MCNC: 82 MG/DL (ref 70–110)
HCT VFR BLD AUTO: 29.2 % (ref 40–54)
HGB BLD-MCNC: 8.8 G/DL (ref 14–18)
IMM GRANULOCYTES # BLD AUTO: 0.02 K/UL (ref 0–0.04)
IMM GRANULOCYTES NFR BLD AUTO: 0.4 % (ref 0–0.5)
LYMPHOCYTES # BLD AUTO: 1.7 K/UL (ref 1–4.8)
LYMPHOCYTES NFR BLD: 29.4 % (ref 18–48)
MAGNESIUM SERPL-MCNC: 1.8 MG/DL (ref 1.6–2.6)
MCH RBC QN AUTO: 26.7 PG (ref 27–31)
MCHC RBC AUTO-ENTMCNC: 30.1 G/DL (ref 32–36)
MCV RBC AUTO: 89 FL (ref 82–98)
MONOCYTES # BLD AUTO: 0.9 K/UL (ref 0.3–1)
MONOCYTES NFR BLD: 16 % (ref 4–15)
NEUTROPHILS # BLD AUTO: 2.9 K/UL (ref 1.8–7.7)
NEUTROPHILS NFR BLD: 51.8 % (ref 38–73)
NRBC BLD-RTO: 0 /100 WBC
PHOSPHATE SERPL-MCNC: 4.2 MG/DL (ref 2.7–4.5)
PLATELET # BLD AUTO: 190 K/UL (ref 150–350)
PMV BLD AUTO: 9.9 FL (ref 9.2–12.9)
POTASSIUM SERPL-SCNC: 4.1 MMOL/L (ref 3.5–5.1)
PROT SERPL-MCNC: 7.3 G/DL (ref 6–8.4)
RBC # BLD AUTO: 3.3 M/UL (ref 4.6–6.2)
SIROLIMUS BLD-MCNC: 5.1 NG/ML (ref 4–20)
SODIUM SERPL-SCNC: 139 MMOL/L (ref 136–145)
WBC # BLD AUTO: 5.68 K/UL (ref 3.9–12.7)

## 2019-12-11 PROCEDURE — 25000003 PHARM REV CODE 250: Performed by: HOSPITALIST

## 2019-12-11 PROCEDURE — 80053 COMPREHEN METABOLIC PANEL: CPT

## 2019-12-11 PROCEDURE — 80195 ASSAY OF SIROLIMUS: CPT

## 2019-12-11 PROCEDURE — 83735 ASSAY OF MAGNESIUM: CPT

## 2019-12-11 PROCEDURE — 99239 HOSP IP/OBS DSCHRG MGMT >30: CPT | Mod: ,,, | Performed by: HOSPITALIST

## 2019-12-11 PROCEDURE — 84100 ASSAY OF PHOSPHORUS: CPT

## 2019-12-11 PROCEDURE — 99239 PR HOSPITAL DISCHARGE DAY,>30 MIN: ICD-10-PCS | Mod: ,,, | Performed by: HOSPITALIST

## 2019-12-11 PROCEDURE — 63600175 PHARM REV CODE 636 W HCPCS: Performed by: HOSPITALIST

## 2019-12-11 PROCEDURE — 63600175 PHARM REV CODE 636 W HCPCS: Performed by: INTERNAL MEDICINE

## 2019-12-11 PROCEDURE — 36415 COLL VENOUS BLD VENIPUNCTURE: CPT

## 2019-12-11 PROCEDURE — 85025 COMPLETE CBC W/AUTO DIFF WBC: CPT

## 2019-12-11 RX ORDER — CEFPODOXIME PROXETIL 200 MG/1
200 TABLET, FILM COATED ORAL EVERY 12 HOURS
Qty: 5 TABLET | Refills: 0 | Status: SHIPPED | OUTPATIENT
Start: 2019-12-11 | End: 2021-09-22

## 2019-12-11 RX ORDER — VALACYCLOVIR HYDROCHLORIDE 1 G/1
1000 TABLET, FILM COATED ORAL 3 TIMES DAILY
Qty: 18 TABLET | Refills: 0 | Status: SHIPPED | OUTPATIENT
Start: 2019-12-11 | End: 2021-09-22

## 2019-12-11 RX ORDER — GABAPENTIN 300 MG/1
300 CAPSULE ORAL 3 TIMES DAILY
Status: DISCONTINUED | OUTPATIENT
Start: 2019-12-11 | End: 2019-12-11 | Stop reason: HOSPADM

## 2019-12-11 RX ORDER — GABAPENTIN 300 MG/1
300 CAPSULE ORAL 3 TIMES DAILY
Qty: 21 CAPSULE | Refills: 0 | Status: SHIPPED | OUTPATIENT
Start: 2019-12-11 | End: 2021-09-22

## 2019-12-11 RX ORDER — SIROLIMUS 0.5 MG/1
0.5 TABLET, FILM COATED ORAL DAILY
Status: DISCONTINUED | OUTPATIENT
Start: 2019-12-12 | End: 2019-12-11 | Stop reason: HOSPADM

## 2019-12-11 RX ORDER — HYDROCODONE BITARTRATE AND ACETAMINOPHEN 10; 325 MG/1; MG/1
1 TABLET ORAL EVERY 8 HOURS PRN
Qty: 20 TABLET | Refills: 0 | Status: SHIPPED | OUTPATIENT
Start: 2019-12-11 | End: 2019-12-18

## 2019-12-11 RX ADMIN — METRONIDAZOLE 500 MG: 500 TABLET ORAL at 06:12

## 2019-12-11 RX ADMIN — SENNOSIDES AND DOCUSATE SODIUM 1 TABLET: 8.6; 5 TABLET ORAL at 08:12

## 2019-12-11 RX ADMIN — PANTOPRAZOLE SODIUM 40 MG: 40 TABLET, DELAYED RELEASE ORAL at 08:12

## 2019-12-11 RX ADMIN — OXYCODONE HYDROCHLORIDE 15 MG: 10 TABLET ORAL at 11:12

## 2019-12-11 RX ADMIN — HYDROMORPHONE HYDROCHLORIDE 1 MG: 1 INJECTION, SOLUTION INTRAMUSCULAR; INTRAVENOUS; SUBCUTANEOUS at 08:12

## 2019-12-11 RX ADMIN — SIROLIMUS 1 MG: 1 TABLET ORAL at 08:12

## 2019-12-11 RX ADMIN — VALACYCLOVIR HYDROCHLORIDE 1000 MG: 500 TABLET, FILM COATED ORAL at 08:12

## 2019-12-11 RX ADMIN — OXYCODONE HYDROCHLORIDE 15 MG: 10 TABLET ORAL at 01:12

## 2019-12-11 RX ADMIN — CEFPODOXIME PROXETIL 200 MG: 200 TABLET, FILM COATED ORAL at 08:12

## 2019-12-11 RX ADMIN — ACETAMINOPHEN 650 MG: 325 TABLET ORAL at 01:12

## 2019-12-11 RX ADMIN — GABAPENTIN 300 MG: 300 CAPSULE ORAL at 11:12

## 2019-12-11 RX ADMIN — OXYCODONE HYDROCHLORIDE 15 MG: 10 TABLET ORAL at 06:12

## 2019-12-11 RX ADMIN — HYDROMORPHONE HYDROCHLORIDE 1 MG: 1 INJECTION, SOLUTION INTRAMUSCULAR; INTRAVENOUS; SUBCUTANEOUS at 03:12

## 2019-12-11 NOTE — PLAN OF CARE
Problem: Adult Inpatient Plan of Care  Goal: Plan of Care Review  Outcome: Ongoing, Progressing  Flowsheets (Taken 12/11/2019 6357)  Plan of Care Reviewed With: patient; mother       VSS. AAO x 4. Ambulates independently. C/O pain to abdomen , PRN oxycodone and Dilaudid administered . Callbell placed within reach and use encouraged. Airborne precautions and contact precautions mainatained .Family at bedside.

## 2019-12-11 NOTE — PLAN OF CARE
Problem: Adult Inpatient Plan of Care  Goal: Plan of Care Review  Outcome: Ongoing, Progressing  Flowsheets (Taken 12/10/2019 1942)  Plan of Care Reviewed With: patient; mother  Patient AAOx4, VSS. Up independently, remains free of falls. Airborne and Contact Isolation initiated for + Shingles. PRN pain medication given ATC. No evidence of distress, safety maintained, hourly rounding performed. Will continue to monitor.

## 2019-12-11 NOTE — PROGRESS NOTES
Progress Note   Hospital Medicine         Patient Name: Jhonny Diana  MRN:  [id number]  [facility] Medicine Team: [facility]OSP MED L Marylou Boggs MD  Date of Admission:  12/6/2019     Length of Stay:  LOS: 5 days   Expected Discharge Date: 12/11/2019  Principal Problem:  Enteritis of infectious origin       Subjective:     Interval History/Overnight Events:  Patient doing well today, continues to have abdominal pain. Id consulted.  Antibiotics changed to cefpodoxime.  Patient started on Valtrex for shingles.  Plan to discharge tomorrow    Review of Systems   Constitutional: Negative for chills, fatigue, fever.   HENT: Negative for sore throat, trouble swallowing.    Eyes: Negative for photophobia, visual disturbance.   Respiratory: Negative for cough, shortness of breath.    Cardiovascular: Negative for chest pain, palpitations, leg swelling.   Gastrointestinal: positive for abdominal pain, denies constipation, positive diarrhea, nausea, denies vomiting.   Endocrine: Negative for cold intolerance, heat intolerance.   Genitourinary: Negative for dysuria, frequency.   Musculoskeletal: Negative for arthralgias, myalgias.   Skin: Negative for rash, wound, erythema   Neurological: Negative for dizziness, syncope, weakness, light-headedness.   Psychiatric/Behavioral: Negative for confusion, hallucinations, anxiety  All other systems reviewed and are negative.    Objective:     Temp:  [97.1 °F (36.2 °C)-100 °F (37.8 °C)]   Pulse:  []   Resp:  [16-18]   BP: (111-140)/(59-82)   SpO2:  [94 %-100 %]       Physical Exam:  Constitutional: appears weak and frail   Head: Normocephalic and atraumatic.   Mouth/Throat: Oropharynx is clear and moist.   Eyes: EOM are normal. Pupils are equal, round, and reactive to light. No scleral icterus.   Neck: Normal range of motion. Neck supple.   Cardiovascular: Normal rate and regular rhythm.  No murmur heard.  Pulmonary/Chest: Effort normal and breath sounds normal. No respiratory distress.  No wheezes, rales, or rhonchi  Abdominal: Soft. Bowel sounds are normal.  positive distension and RUQ tenderness  Musculoskeletal: Normal range of motion. No edema.   Neurological: Alert and oriented to person, place, and time.   Skin: Skin is warm and dry.   Psychiatric: Normal mood and affect. Behavior is normal.             Recent Labs   Lab 12/06/19  1739 12/07/19  0655 12/08/19  0423 12/09/19  0438 12/10/19  0409 12/11/19  0421   WBC 7.00 5.66 3.87* 3.65* 4.37 5.68   HGB 8.6* 7.9* 6.5* 7.4* 8.0* 8.8*   HCT 27.5* 25.0* 21.8* 24.4* 26.5* 29.2*    209 160 155 163 190     Recent Labs   Lab 12/09/19 0438 12/10/19  0409 12/11/19  0421    137 139   K 3.9 4.1 4.1   * 107 108   CO2 21* 22* 21*   BUN 8 10 12   CREATININE 1.6* 1.8* 1.9*   GLU 84 86 82   CALCIUM 8.0* 7.8* 8.4*   MG 1.5* 1.9 1.8   PHOS 3.8 3.9 4.2     Recent Labs   Lab 12/06/19  1739  12/09/19 0438 12/10/19  0409 12/11/19  0421   ALKPHOS 142*   < > 122 127 139*   ALT 8*   < > 8* 6* 7*   AST 22   < > 23 22 22   ALBUMIN 2.7*   < > 2.3* 2.3* 2.6*   PROT 8.0   < > 6.8 6.8 7.3   BILITOT 0.4   < > 0.3 0.2 0.3   INR 1.0  --   --   --   --     < > = values in this interval not displayed.     No results for input(s): POCTGLUCOSE in the last 168 hours.     cefpodoxime  200 mg Oral Q12H    ergocalciferol  50,000 Units Oral Q7 Days    lidocaine  1 patch Transdermal Q24H    metroNIDAZOLE  500 mg Oral Q8H    pantoprazole  40 mg Oral Daily    senna-docusate 8.6-50 mg  1 tablet Oral BID    sirolimus  1 mg Oral Daily    valACYclovir  1,000 mg Oral TID       Assessment and Plan       Active Hospital Problems    Diagnosis  POA    *Enteritis of infectious origin [A09]  Yes    Long-term use of immunosuppressant medication [Z79.899]  Not Applicable     Priority: 5      Chronic    S/P liver transplant [Z94.4]  Not Applicable     Priority: 7      Chronic    Herpes zoster without complication [B02.9]  Yes    Diarrhea [R19.7]  Yes     Gastroenteritis [K52.9]  Yes    LAURA (acute kidney injury) [N17.9]  Yes    Anemia of chronic disease [D63.8]  Yes    Moderate protein malnutrition [E44.0]  Yes    Intractable nausea and vomiting [R11.2]  Yes    Prophylactic immunotherapy [Z29.8]  Not Applicable     Chronic    Metabolic acidosis [E87.2]  Yes      Resolved Hospital Problems    Diagnosis Date Resolved POA    Ascites [R18.8] 05/21/2018 Yes     Added automatically from request for surgery [id number]       # Infectious enteritis   # Giardia infection  # Intractable N/V  # Diarrhea   # Fluid collection   - patient recently diagnosed with giardia and discharged with PO flagly  - states when he got home his symptoms came back, N/V and worsening abdominal pain and diarrhea   - CT ab/pelv at OSH shows worsening fluid collection, image has been sent over and will need to placed in epic; unlikely ascites as patient does not have evidence of cirrhosis in his transplant; patient has had ex-laps which showed brown/green fluid, possible bile leak? Will need to review image with radiology and hepatology  - Ab X ray reviewed   - started on on IV Flagyl and Cipro on admit   - transplant ID consulted, appreciate recs  - paracentesis completed with high WBC count, however not consistent with SBP.  Antibiotics changed to Flagyl and cefpodoxime by Infectious Disease on 12/10/2019.       # shingles  - right abdomen.  Started on valACYclovir on 12/10/2019    # H/O Liver transplant   # Immunosuppressed status   - hepatology consulted  - cont sirolimus as above.  - check levels      # LAURA on CKD stage 3  # Metabolic acidosis   - Cr up to 2, baseline of 1.5  - likely due to dehydration  - 1L NS bolus, then start on 100 cc/hr, stopped now  - urine studies and renal U/S reviewed   - strict I/O  - improved to 1.6 , likely baseline     # Microcytic Anemia  - checking iron studies, hemolysis labs  - transfusing 1 unit of PRBC on 12/8, responded well     # Moderate protein jalen  malnutrition  - PAB, ensure, dietary    # Hypomagnesemia   - replaced      Diet:  bland  GI PPx:    DVT PPx:    Goals of Care:  full        Disposition: 12/12    Signing Physician:     Marylou Boggs MD  Department of [facility] Medicine   [facility]  Pager [phone] Sbpyxxs [phone]  12/11/2019

## 2019-12-11 NOTE — TELEPHONE ENCOUNTER
.  Patient: Jhonny Diana       MRN: [id number]      : 1990     Age: 29 y.o.  [address]  [place] [address]    Provider: Hepatologist - Suzanne    Urgency of review: urgent    Patient Transplant Status: s/p liver transplant    Reason for presentation: s/p liver transplant.  Had CT at outside hospital that showed fluid collection.  Please assess to see if this should be drained.      Clinical Summary: Mr Diana is a 29 year old man with history of ETOH Hep s/p OLT 10/19/17, with post-transplant period notable for LAURA previously on HD, anastomotic stricture, ETOH relapse (2019), who is presenting to the hospital due n/v/d.     He was recently admitted () due to concern for acute on chronic abdominal pain, fever, and imaging at OSH concerning for enteritis. He was hospitalized at OSH with concern for enteritis, with antibiotics and subsequently presented to C for further workup. While admitted, stool O&P was notable for giardia with improvement in his symptoms with antibiotics and was discharged.     He notes he was initially doing well after leaving the hospital but developed nausea, vomiting and diarrhea and inability to tolerate PO and medications therefore presented to the hospital. Was unable to tolerate his flagyl but was able to take his sirolimus. Denies f/c/s.  Endorses diffuse abdominal pain (predominantly RUQ) which is slightly worse than baseline. CT scan was concerning for worsening abdominal fluid (OSH, pending upload).       Imaging to be reviewed: Ct outside     HCC Treatment History: N/A     ABO: O POS    Platelets:   Lab Results   Component Value Date/Time     2019 04:21 AM    EXTPLATELETS 69.0 2019     Creatinine:   Lab Results   Component Value Date/Time    CREATININE 1.9 (H) 2019 04:21 AM    EXTCREATININ 1.47 2019     Bilirubin:   Lab Results   Component Value Date/Time    BILITOT 0.3 2019 04:21 AM    EXTBILITOTAL 0.5 2019     EXTBILIRUBIN 0.15 07/12/2019     AFP Last 3 each if available:   Lab Results   Component Value Date/Time    AFP 7.3 07/27/2018 10:37 AM       MELD: MELD-Na score: 12 at 12/8/2019  4:23 AM  MELD score: 12 at 12/8/2019  4:23 AM  Calculated from:  Serum Creatinine: 1.7 mg/dL at 12/8/2019  4:23 AM  Serum Sodium: 139 mmol/L (Rounded to 137 mmol/L) at 12/8/2019  4:23 AM  Total Bilirubin: 0.2 mg/dL (Rounded to 1 mg/dL) at 12/8/2019  4:23 AM  INR(ratio): 1.0 at 12/6/2019  5:39 PM  Age: 29 years    Plan:     Follow-up Provider:

## 2019-12-11 NOTE — DISCHARGE SUMMARY
DISCHARGE SUMMARY  Hospital Medicine    Team: [facility]    Patient Name: Jhonny Diana  YOB: 1990    Admit Date: 12/6/2019    Discharge Date: 12/11/2019    Discharge Attending Physician: Marylou Boggs MD     Chief Complaint: enteritis     Princilpal Diagnoses:  Active Hospital Problems    Diagnosis  POA    *Enteritis of infectious origin [A09]  Yes    Long-term use of immunosuppressant medication [Z79.899]  Not Applicable     Priority: 5      Chronic    S/P liver transplant [Z94.4]  Not Applicable     Priority: 7      Chronic    Herpes zoster without complication [B02.9]  Yes    Diarrhea [R19.7]  Yes    Gastroenteritis [K52.9]  Yes    LAURA (acute kidney injury) [N17.9]  Yes    Anemia of chronic disease [D63.8]  Yes    Moderate protein malnutrition [E44.0]  Yes    Intractable nausea and vomiting [R11.2]  Yes    Prophylactic immunotherapy [Z29.8]  Not Applicable     Chronic    Metabolic acidosis [E87.2]  Yes      Resolved Hospital Problems    Diagnosis Date Resolved POA    Ascites [R18.8] 05/21/2018 Yes     Added automatically from request for surgery [id number]         Discharged Condition: Admit problems have stabilized    HOSPITAL COURSE:      Initial Presentation:    As per admitting provider,     29 y.o. male who has a past medical history of HTN and Liver transplant performed 10/19/17 secondary to acute alcoholic hepatitis with multiple post-op complications including biliary stenosis s/p multiple ERCPs, adhesions, abdominal infections and prolonged LAURA requiring dialysis presented with RUQ pain, nausea, vomiting and diarrhea. Patient was recently discharged [facility] [place] he initially presented with abdominal pain and MRI of abdomen showed changes consistent with enteritis. CT Abd showed significant constipation in colon. He had extensive work up to exclude obstruction (general surgery stated no surgical intervention required), US Mesenteric arteria to exclude Ischemia,  and EGD for biopsy to exclude CMV. Stool ova and parasites was positive for Giardia. Patient was started on zosyn on admission and ID recommended 7 day course of oral Metronidazole 500 mg po tid. He was then set up for ID follow up for Giardia and Hepatology appointment for immunosuppressant therapy dosing. After discharge patient had difficulty getting his prescriptions filled as they were sent to the wrong pharmacy. He was able to fill his prescriptions and started taking the antibiotics. He now presents with similar symptoms and because of the nausea and vomiting he was unable to keep his antibiotics down. Repeat CT scan in ED showed normal bowel but increased ascites. He is otherwise hemodynamically stable. Hepatology aware of patient and have accepted the patient for transfer     Patient states his pain in his RUQ, 8/10 currently, non radiating and has had 4 episodes of N/V and 3 watery stools which has worsened since his discharge.      Course of Principle Problem for Admission:    # Infectious enteritis   # Giardia infection  # Intractable N/V  # Diarrhea   # Fluid collection   - patient recently diagnosed with giardia and discharged with PO flagly  - states when he got home his symptoms came back, N/V and worsening abdominal pain and diarrhea   - CT ab/pelv at OSH shows worsening fluid collection, image has been sent over and will need to placed in epic; unlikely ascites as patient does not have evidence of cirrhosis in his transplant; patient has had ex-laps which showed brown/green fluid.   - Imaging reviewed at radiology and hepatology conference, fluid collection noted previously use that to be diffuse ascites. This has been tapped and negative for SBP.  - started on on IV Flagyl and Cipro on admit   - transplant ID consulted  - paracentesis completed with high WBC count, however not consistent with SBP.   Antibiotics changed to Flagyl and cefpodoxime by Infectious Disease on 12/10/2019.  Concern for  peritoneal fluid/ ascites collection due to recent Giardia enteritis.  No concern for liver dysfunction or liver vasculature clot at this time.  - discharged with cefpodoxime for treatment of enteritis    On the day of d/c, patient was doing well with no acute issues.   Discharged with meds below:  5 day total course of antibiotics for possible resolving peritonitis , complete with cefpodoxime.  Finish 10 day course of metronidazole for giardia  valACYclovir 7 days for likely VZV    Other Medical Problems Addressed in the Hospital:    # shingles  - right abdomen.  Started on valACYclovir on 12/10/2019    # H/O Liver transplant   # Immunosuppressed status   - hepatology consulted  - cont sirolimus as above.  - check levels      # LAURA on CKD stage 3  # Metabolic acidosis   - Cr up to 2, baseline of 1.5  - likely due to dehydration  - 1L NS bolus, then start on 100 cc/hr, stopped now  - urine studies and renal U/S reviewed   - strict I/O  - improved to 1.6 , likely baseline     # Microcytic Anemia  - checking iron studies, hemolysis labs  - transfusing 1 unit of PRBC on 12/8, responded well      CONSULTS:  hepatology, ID    PROCEDURES: paracentesis    Labs:    Chemistries:   Recent Labs   Lab 12/09/19  0438 12/10/19  0409 12/11/19  0421    137 139   K 3.9 4.1 4.1   * 107 108   CO2 21* 22* 21*   BUN 8 10 12   CREATININE 1.6* 1.8* 1.9*   CALCIUM 8.0* 7.8* 8.4*   PROT 6.8 6.8 7.3   BILITOT 0.3 0.2 0.3   ALKPHOS 122 127 139*   ALT 8* 6* 7*   AST 23 22 22   MG 1.5* 1.9 1.8   PHOS 3.8 3.9 4.2        WBC:   Recent Labs   Lab 12/07/19  0655 12/08/19  0423 12/09/19  0438 12/10/19  0409 12/11/19  0421   WBC 5.66 3.87* 3.65* 4.37 5.68     Bands:     CBC/Anemia Labs: Coags:    Recent Labs   Lab 12/07/19  0655  12/09/19  0438 12/10/19  0409 12/11/19  0421   WBC 5.66   < > 3.65* 4.37 5.68   HGB 7.9*   < > 7.4* 8.0* 8.8*   HCT 25.0*   < > 24.4* 26.5* 29.2*      < > 155 163 190   MCV 86   < > 89 89 89   RDW 15.9*    < > 15.9* 16.1* 16.4*   IRON 62  --   --   --   --    FERRITIN 663*  --   --   --   --    RETIC  --   --  0.9  --   --     < > = values in this interval not displayed.    Recent Labs   Lab 12/06/19 1739   INR 1.0        Diagnostic Results:    Microbiology Results (last 7 days)     Procedure Component Value Units Date/Time    Culture, Anaerobe [[id number]] Collected:  12/09/19 1636    Order Status:  Completed Specimen:  Body Fluid from Ascites Updated:  12/11/19 0844     Anaerobic Culture Culture in progress    Blood culture [[id number]] Collected:  12/06/19 1739    Order Status:  Completed Specimen:  Blood Updated:  12/11/19 0612     Blood Culture, Routine No Growth to date      No Growth to date      No Growth to date      No Growth to date      No Growth to date    Blood culture [[id number]] Collected:  12/06/19 1738    Order Status:  Completed Specimen:  Blood Updated:  12/11/19 0612     Blood Culture, Routine No Growth to date      No Growth to date      No Growth to date      No Growth to date      No Growth to date    AFB Culture & Smear [[id number]] Collected:  12/09/19 1636    Order Status:  Completed Specimen:  Body Fluid from Ascites Updated:  12/10/19 2127     AFB Culture & Smear Culture in progress     AFB CULTURE STAIN No acid fast bacilli seen.    Aerobic culture [[id number]] Collected:  12/09/19 1636    Order Status:  Completed Specimen:  Body Fluid from Ascites Updated:  12/10/19 0726     Aerobic Bacterial Culture No growth    Fungus culture [[id number]] Collected:  12/09/19 1636    Order Status:  Sent Specimen:  Body Fluid from Ascites Updated:  12/09/19 2005    Stool culture [[id number]]     Order Status:  Canceled Specimen:  Stool     Clostridium difficile EIA [[id number]]     Order Status:  Canceled Specimen:  Stool          Ref. Range 12/9/2019 16:35   Fluid Color Unknown Yellow   Fluid Appearance Unknown Hazy   Body Fluid Type Unknown Ascites   WBC, Body Fluid Latest Units: /cu mm 679   Segs, Fluid  Latest Units: % 27   Lymphs, Fluid Latest Units: % 43   Monocytes/Macrophages, Fluid Latest Units: % 26   Baso, Fluid Latest Units: % 2       Disposition:  Home           Discharge Medication List:       Jhonny Diana   Home Medication Instructions NISHI:[id number]    Printed on:12/11/19 4593   Medication Information                      bisacodyl (DULCOLAX) 5 mg EC tablet  Take 5 mg by mouth daily as needed for Constipation.             cefpodoxime (VANTIN) 200 MG tablet  Take 1 tablet (200 mg total) by mouth every 12 (twelve) hours.             ergocalciferol (ERGOCALCIFEROL) 50,000 unit Cap  Take 1 capsule (50,000 Units total) by mouth every 7 days.             gabapentin (NEURONTIN) 300 MG capsule  Take 1 capsule (300 mg total) by mouth 3 (three) times daily. for 7 days             HYDROcodone-acetaminophen (NORCO)  mg per tablet  Take 1 tablet by mouth every 8 (eight) hours as needed (SEVERE pain).             promethazine (PHENERGAN) 25 MG tablet  Take 1 tablet (25 mg total) by mouth every 6 (six) hours as needed for Nausea.             sirolimus 0.5 mg Tab  Take 1 tablet (0.5 mg total) by mouth once daily.             valACYclovir (VALTREX) 1000 MG tablet  Take 1 tablet (1,000 mg total) by mouth 3 (three) times daily.                   At the time of discharge patient was told to take all medications as prescribed, to keep all followup appointments, and to call their primary care physician or return to the emergency room if they have any worsening or concerning symptoms.    Time spent on the discharge of the patient including review of hospital course with the patient. reviewing discharge medications and arranging follow-up care 45 minutes.  Patient was seen and examined on the date of discharge and determined to be suitable for discharge.        Signing Physician:  Marylou Boggs MD

## 2019-12-12 DIAGNOSIS — K42.9 UMBILICAL HERNIA WITHOUT OBSTRUCTION AND WITHOUT GANGRENE: ICD-10-CM

## 2019-12-12 DIAGNOSIS — Z94.4 LIVER TRANSPLANTED: Primary | ICD-10-CM

## 2019-12-12 LAB
BACTERIA BLD CULT: NORMAL
BACTERIA BLD CULT: NORMAL
BACTERIA SPEC AEROBE CULT: NO GROWTH
FINAL PATHOLOGIC DIAGNOSIS: NORMAL

## 2019-12-12 NOTE — PLAN OF CARE
12/12/19 0928   Final Note   Assessment Type Final Discharge Note   Anticipated Discharge Disposition Home   What phone number can be called within the next 1-3 days to see how you are doing after discharge? [phone]   Right Care Referral Info   Post Acute Recommendation No Care

## 2019-12-12 NOTE — PLAN OF CARE
12/12/19 0929   Post-Acute Status   Post-Acute Authorization Other   Other Status No Post-Acute Service Needs   Discharge Delays None known at this time

## 2019-12-13 LAB — PHOSPHATIDYLETHANOL (PETH): NEGATIVE NG/ML

## 2019-12-16 ENCOUNTER — TELEPHONE (OUTPATIENT)
Dept: SURGERY | Facility: CLINIC | Age: 29
End: 2019-12-16

## 2019-12-16 LAB — BACTERIA SPEC ANAEROBE CULT: NORMAL

## 2019-12-16 NOTE — TELEPHONE ENCOUNTER
----- Message from Eli Padron RN sent at 12/13/2019 11:47 AM CST -----  Lubna,     I don't work with Dr. Krause. I'm copying his nurse Khadijah and his MA William on the message. One of them will be able to help get the patient scheduled.    Eli Lucio and William,    Can you reach out to Mr. Diana for scheduling?    Eli  ----- Message -----  From: Lubna Ly MA  Sent: 12/13/2019  10:08 AM CST  To: Eli Padron RN    Hello and Good Morning,    The above patient needs an appointment with Dr. Hamm for umbilical hernia repair.      He needs something near the ending of January.    I appreciate any assistance with his appointment.    Thank you,    Lubna guaman [phone]

## 2019-12-16 NOTE — TELEPHONE ENCOUNTER
Left VM for pt to return phone call in reference to scheduling an appt for an umbilical hernia repair.

## 2019-12-20 NOTE — TELEPHONE ENCOUNTER
Patient: Jhonny Diana       MRN: [id number]      : 1990     Age: 29 y.o.  [address]  [place] [address]     Provider: Hepatologist - Suzanne     Urgency of review: urgent     Patient Transplant Status: s/p liver transplant     Reason for presentation: s/p liver transplant.  Had CT at outside hospital that showed fluid collection.  Please assess to see if this should be drained.       Clinical Summary: Mr Diana is a 29 year old man with history of ETOH Hep s/p OLT 10/19/17, with post-transplant period notable for LAURA previously on HD, anastomotic stricture, ETOH relapse (2019), who is presenting to the hospital due n/v/d.     He was recently admitted () due to concern for acute on chronic abdominal pain, fever, and imaging at OSH concerning for enteritis. He was hospitalized at OSH with concern for enteritis, with antibiotics and subsequently presented to C for further workup. While admitted, stool O&P was notable for giardia with improvement in his symptoms with antibiotics and was discharged.     He notes he was initially doing well after leaving the hospital but developed nausea, vomiting and diarrhea and inability to tolerate PO and medications therefore presented to the hospital. Was unable to tolerate his flagyl but was able to take his sirolimus. Denies f/c/s.  Endorses diffuse abdominal pain (predominantly RUQ) which is slightly worse than baseline. CT scan was concerning for worsening abdominal fluid (OSH, pending upload).        Imaging to be reviewed: Ct outside      HCC Treatment History: N/A      ABO: O POS     Platelets:         Lab Results   Component Value Date/Time      2019 04:21 AM     EXTPLATELETS 69.0 2019      Creatinine:         Lab Results   Component Value Date/Time     CREATININE 1.9 (H) 2019 04:21 AM     EXTCREATININ 1.47 2019      Bilirubin:         Lab Results   Component Value Date/Time     BILITOT 0.3 2019 04:21 AM      EXTBILITOTAL 0.5 07/12/2019     EXTBILIRUBIN 0.15 07/12/2019      AFP Last 3 each if available:         Lab Results   Component Value Date/Time     AFP 7.3 07/27/2018 10:37 AM         MELD: MELD-Na score: 12 at 12/8/2019  4:23 AM  MELD score: 12 at 12/8/2019  4:23 AM  Calculated from:  Serum Creatinine: 1.7 mg/dL at 12/8/2019  4:23 AM  Serum Sodium: 139 mmol/L (Rounded to 137 mmol/L) at 12/8/2019  4:23 AM  Total Bilirubin: 0.2 mg/dL (Rounded to 1 mg/dL) at 12/8/2019  4:23 AM  INR(ratio): 1.0 at 12/6/2019  5:39 PM  Age: 29 years     Plan: Paracentesis done, there was free fluid along with mesentery edema.  The fluid can be drained.  Large umbilical hernia that needs to be repaired.        Follow-up Provider: Dr Palacios

## 2020-01-03 NOTE — ASSESSMENT & PLAN NOTE
- See long term use of immunosuppression.    I will work with you and the Child Protective team to ensure Qiana has the very best care.    I would recommend behavioral therapy for her. Once we have appropriate consent to evaluate and treat we will help you schedule that.    Based off your developmental questionnaire, Qiana also should have therapy for ________________________________________________. Once we have consent a referral will be placed.    She is due for vaccines (Flu, polio, tetanus/diptheria/whooping cough). Once we have appropriate consent we will do those vaccines. She also should have blood work to check a lead level    I agree with going forth with her procedure that is already scheduled with [facility].

## 2020-01-08 NOTE — ASSESSMENT & PLAN NOTE
- HD resumed on previous admission. Anuric on admission and dialyzes M-W-F.  - Nephrology following.    - Goal is for kidney txp in future- currently not a candidate due to health status.  - urine output has improved since 3/16  - HD held 3/21 & 3/23  - Nephrology started torsemide 50 mg daily 3/24  - BUN elevated though on TPN, HD 3/29/18   - Will plan HD twice weekly for now   HD held 4/3, had on HD 4/6  - Hold torsemide for now (d/roselia on 3/31)   Carac Counseling:  I discussed with the patient the risks of Carac including but not limited to erythema, scaling, itching, weeping, crusting, and pain.

## 2020-01-15 LAB — FUNGUS SPEC CULT: NORMAL

## 2020-02-10 LAB
ACID FAST MOD KINY STN SPEC: NORMAL
MYCOBACTERIUM SPEC QL CULT: NORMAL

## 2020-02-14 NOTE — PLAN OF CARE
Problem: Physical Therapy Goal  Goal: Physical Therapy Goal  Goals to be met by: 2017    Patient will increase functional independence with mobility by performin. Supine to sit with CGA.   2. Sit to supine with CGA  3. Sit to stand transfer with SBA using LRAD or no Ad.   4. Bed to chair transfer with SBA using LRAD  - GOAL MET  5. Gait x150 feet with SBA using LRAD or no Ad. - GOAL MET  6. Ascend/descend 4 stair with bilateral Handrails with CGA.  7. Lower extremity exercise program x20 reps per handout, with independence.  8.  Pt to perf car transfer simulation with CGA.               Outcome: Ongoing (interventions implemented as appropriate)  Improved balance and ability to ambulate noted today.       medical staff

## 2020-04-12 DIAGNOSIS — Z94.4 LIVER TRANSPLANTED: ICD-10-CM

## 2020-04-12 RX ORDER — SIROLIMUS 1 MG/1
TABLET, FILM COATED ORAL
Qty: 90 TABLET | Refills: 12 | Status: SHIPPED | OUTPATIENT
Start: 2020-04-12 | End: 2021-07-21

## 2020-09-17 NOTE — ASSESSMENT & PLAN NOTE
- Poor PO intake since transplant requiring short course of TPN during previous hospital stay.   - albumin remains decreased but appetite slowly improving per pt.   - Dietary consulted. Will need to closely monitor PO intake.   - supplements also ordered.   - SHANE with tube feeding start 1/16. Stopped 1/17/18, was not tolerating. Shane removed 1/20.  - prealbumin 7 on 1/15.  - TPN started 1/16/18.   - Pt able to eat minimally 1/21 and 1/22 which is an improvement.  - GI consulted - EGD reviewed, increased ppi bid, miralax bid, and metamucil bid, decreased narcotics.  - continue reglan, placed SHANE tube with tube feeds - transitioned to nightly feeds. Not tolerating well due to nausea.   - small bowel follow through with normal findings.   - consulted gi for possible gj tube placement, holding off on this for now as with ongoing fevers.  - Dietary changed tube feeds back to 24 hours from nightly 2/19 as not eating enough during the day to meet caloric needs. TF on hold since 2/20.  - retrial marinol 2/22 as pt reports that's the only thing helping his appetite, in the evening 2/2 concern for possible drowsiness  - Pt then developed persistent N/V again and TPN started 2/24. Cont TPN for now.   - Pt able to eat 1/4 sandwich without vomiting 2/25.  Not been able to eat anything more than that in 48 hours per pt and mother.    - EGD 3/6 with normal esophagus    - congested, erythematous and nodular mucosa in the stomach.    - biliary stent in the duodenum    - one duodenal ulcer with a clean ulcer base (Emir Class III).    - biopsies taken in the duodenal bulb and second and third portion of the duodenum.  - hold reglan for neuro symptoms.   I have reviewed and confirmed nurses' notes for patient's medications, allergies, medical history, and surgical history.

## 2020-10-12 NOTE — TELEPHONE ENCOUNTER
Returned call. Pharmacy states they do not have this patient in their system.      ----- Message from Alisa Licona sent at 8/16/2018  2:00 PM CDT -----  Contact: Pharmacy  [place] calling regarding Promacta prescription          [place] call back number [phone]      Skin Substitute Text: The defect edges were debeveled with a #15 scalpel blade.  Given the location of the defect, shape of the defect and the proximity to free margins a skin substitute graft was deemed most appropriate.  The graft material was trimmed to fit the size of the defect. The graft was then placed in the primary defect and oriented appropriately.

## 2020-10-21 NOTE — ASSESSMENT & PLAN NOTE
- Poor PO intake since transplant requiring short course of TPN during previous hospital stay.   - Due to N/V and abdominal pain, multiple attempts were made to place SHANE tube with tube feeding, but patient did not tolerate TF  - Transitioned to TPN 1/16 then again on 2/24 due to poor tolerance of SHANE tube/TF  - prealbumin 7 on 1/15 --> prealbumin 22 on 3/19, monitoring weekly (Mondays)  - EGD performed 1/25 and 3/6, gastroparesis, duodenal ulcer, and congested/erythematous gastric mucosa  - Small bowel through with normal findings.  - Consulted GI for GJ tube placement, but was canceled due to ongoing fevers  - Hold Reglan and Marinol 3/8 for neuro symptoms --> restarted reglan and marinol 3/19, will need to dc marinol before discharge because not covered by insurance.  - set goals for caloric intake with patient, continue nocturnal TPN started 3/20 PM to encourage appetite during the day  - Trying to set small caloric goals daily, for now goal is 300-500 jalen/day. Ultimately needs to consume 700 jalen/day to start weaning TPN.    -met with dietician 4/3/18 to discuss dietary goals.   - TPN stopped on 4/6/18 as intake significantly improving.    Detail Level: Detailed Detail Level: Generalized Detail Level: Zone

## 2020-11-06 ENCOUNTER — TELEPHONE (OUTPATIENT)
Dept: TRANSPLANT | Facility: CLINIC | Age: 30
End: 2020-11-06

## 2020-11-06 NOTE — TELEPHONE ENCOUNTER
Informed pt no restrictions on dental visit for broken tooth. No pre meds required. He verbalizes understanding. Reminded pt he is over due for labs, he states he is aware and has moved. Pt requesting lab orders be emailed to him and he will take to a new lab. Asked pt to notify office of once complete.

## 2020-11-06 NOTE — TELEPHONE ENCOUNTER
----- Message from Sukhwinder Oquendo sent at 11/6/2020  2:10 PM CST -----  Regarding: Rx Info      Pt has broken tooth and would like for his dentist to know everything he can't take. Would like callback Asap            [phone] (YADI)

## 2020-11-16 NOTE — ASSESSMENT & PLAN NOTE
- Maintenance IS with cyclosporine --> transitioned to Prograf 2/24 for continued nausea but did not tolerate from neuro standpoint.  PT IS NOT A CANDIDATE FOR TACROLIMUS.    - Transitioned back to cyclosporine 3/1  - D/c cyclosporine, started rapa & stress dose steroids 3/10 for continued confusion  - Mental status now improved, re-trial cyclosporine 3/13, d/c rapa 3/13  - Transition back to prednisone taper from stress dose steroids 3/15 --> will decrease by 5 mg q week (decrease to 5 mg 4/6).  - Restarted low dose MMF 3/23- with nausea --> D/C Cellcept 3/31 --> nausea improved --> continue to hold as has passed protocol for Cellcept   3

## 2021-01-08 ENCOUNTER — PATIENT MESSAGE (OUTPATIENT)
Dept: TRANSPLANT | Facility: CLINIC | Age: 31
End: 2021-01-08

## 2021-01-26 ENCOUNTER — PATIENT MESSAGE (OUTPATIENT)
Dept: TRANSPLANT | Facility: CLINIC | Age: 31
End: 2021-01-26

## 2021-02-01 LAB
EXT ALBUMIN: 4.1
EXT ALKALINE PHOSPHATASE: 170
EXT ALT: 27
EXT AST: 31
EXT BILIRUBIN TOTAL: 0.5
EXT BUN: 24
EXT CALCIUM: 9.1
EXT CHLORIDE: 106
EXT CO2: 20
EXT CREATININE: 1.6 MG/DL
EXT EOSINOPHIL%: 8.3
EXT GLUCOSE: 93
EXT HEMATOCRIT: 35.7
EXT HEMOGLOBIN: 12.6
EXT INR: 1
EXT LYMPH%: 34.8
EXT MONOCYTES%: 8.3
EXT PLATELETS: 125
EXT POTASSIUM: 4.3
EXT PROTEIN TOTAL: 8.3
EXT PT: 12.6
EXT SEGS%: 46.5
EXT SIROLIMUS LVL: 3.2
EXT SODIUM: 138 MMOL/L
EXT WBC: 4.8

## 2021-02-08 ENCOUNTER — TELEPHONE (OUTPATIENT)
Dept: HEPATOLOGY | Facility: CLINIC | Age: 31
End: 2021-02-08

## 2021-02-13 ENCOUNTER — PATIENT MESSAGE (OUTPATIENT)
Dept: TRANSPLANT | Facility: CLINIC | Age: 31
End: 2021-02-13

## 2021-03-01 NOTE — PROGRESS NOTES
[facility]  Liver Transplant  Progress Note    Patient Name: Jhonny Diana  MRN: [id number]  Admission Date: 2018  Hospital Length of Stay: 7 days  Code Status: Full Code  Primary Care Provider: Primary Doctor No  Post-Operative Day: 91    ORGAN:   LIVER  Disease Etiology: Acute Alcoholic Hepatitis  Donor Type:    - Brain Death  CDC High Risk:   No  Donor CMV Status:   Donor CMV Status: Positive  Donor HBcAB:   Negative  Donor HCV Status:   Negative  Whole or Partial: Whole Liver  Biliary Anastomosis: End to End  Arterial Anatomy: Standard  Subjective:     History of Present Illness:  Jhonny Diana is a 26 y/o male with past medical history of alcoholic cirrhosis.  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 Found to have peritonitis in November (WBC 5000, 75% PNM) neg for bile leak. He was treated initially with vanc/cefepime. Repeat cell counts  with some improvement (WBC 1400, 45% PNM). He has undergone multiple paracenteses as well as abscess drainage of perihepatic fluid collections and treated with antimicrobial therapy but no positive cultures. Of note, biliary stricture also identified and ERCP performed on 2017 with sphincterotomy and biliary stent placed. Liver tests still have not normalized despite intervention, bilirubin and AP remain elevated. Other pertinent PMH current wound vac in place 2/2 wound infection, malnutrition requiring TPN for short course and ongoing hypoalbuminemia, and seizure activity while on prograf and has since been switched to cyclosporine without reoccurrence.  He presented to the ER for fever, abdominal pain, and N/V. He reports fever (103) for 1 day  [facility] Progress Note    Date: 2021    Name: Sarah Sanchez    MRN: [id number]         : 1946    POC Labs  Epoetin alpha Injection(Retacrit) (HELD)    Ms. Guillermina Redding arrived to [facility], ambulatory, and in no acute distress,  at [address] [place] [address]. Pt was assessed and education was provided. Patient denied complaints of pain or discomfort. Patient presents today for first dose of Epoetin alpha inj., dependent upon today's lab results, and as ordered. Procedure was explained to patient and she verbalized her understanding. Patient denied any questions or concerns regarding therapy plan. Ms. Braden Lopez vitals were reviewed and patient was observed for 5 minutes prior to treatment. Visit Vitals  /60 (BP 1 Location: Right upper arm, BP Patient Position: Sitting)   Pulse 79   Temp 99.2 °F (37.3 °C)   Resp 18   SpO2 95%     Venipuncture performed to right antecubital x1 successful attempt. Blood sample was collected without difficulty and sent to lab for (CBC w/diff),per written orders. Gauze dressing and coban applied to site. Preliminary Lab results were obtained and reviewed. H/H= 10.8/34.4  H/H outside of ordered parameters for the administration of Epoetin alpha today and injection will be held. Patient made aware and verbalized her understanding. Patient armband removed and shredded. Ms. Guillermina Redding was discharged from [place] [address] in stable condition at 1150. She is to return on 03/15/2021 at 1100 for her next appointment.     Ashley Cm RN  2021  1150 prior. Overnight, he developed N/V, reports small amount of green emesis with new left sided pain. He also endorses worsening SOB with exertion. He was scheduled as an outpatient for follow up paracentesis and IR drainage of fluid collection. CXR in ER shows large pleural effusion with subsegmental atelectasis. Infectious work up initiated in ER. His ANC is 900. Broad spectrum antibiotics initiated in ED. He denies chest pain, palpitations, diarrhea, constipation, or back pain. Denies any sick contacts.    Hospital Course:  Thoracentesis (1.2L off), Paracentesis (1.6L off), and IR drainage of fluid collection 1/11, all fluids negative for infection. ID consulted. Broad spectrum antibiotics d/c'd 1/15. Chronically malnourished with poor PO intake, prealbumin 7. Alex tube placed 1/15 for tube feedings.     ERCP 1/17 with sludge and a biliary stone which was removed and stent exchanged.  Remained with n/v; therefore, TF remained on hold and TPN continued.      Interval History: Patient with continued nausea/vomiting overnight/this AM. With fever yesterday and today. Repeat blood cx NGTD. Continue Vanc/Cefepime which was restarted yesterday.  ID consulted.  CT C/A/P obtained.  With large R pleural effusion and ascites- both to be drained and analyzed in AM.  Regular diet placed.  Continue TPN.    Scheduled Meds:   sodium chloride 0.9%   Intravenous Once    [START ON 1/19/2018] atovaquone  1,500 mg Per NG tube Daily    ceFEPime (MAXIPIME) IVPB  1 g Intravenous Q12H    cycloSPORINE modified (NEORAL)  100 mg Oral BID    docusate  100 mg Per NG tube TID    epoetin maurisio (PROCRIT) injection  10,000 Units Intravenous Every Mon, Wed, Fri    fat emulsion 20%  250 mL Intravenous Daily    heparin (porcine)  5,000 Units Subcutaneous Q12H    levetiracetam oral soln  500 mg Per NG tube BID    levothyroxine  75 mcg Oral Before breakfast    pantoprazole  40 mg Oral Daily    pantoprazole  40 mg Intravenous Daily     polyethylene glycol  17 g Oral Daily    potassium chloride 10%  40 mEq Oral Once    ursodiol  300 mg Per NG tube BID     Continuous Infusions:   TPN ADULT CENTRAL LINE CUSTOM 40 mL/hr at 01/17/18 2154    TPN ADULT CENTRAL LINE CUSTOM       PRN Meds:sodium chloride 0.9%, sodium chloride 0.9%, sodium chloride 0.9%, acetaminophen, heparin (porcine), omnipaque, ondansetron, ondansetron, oxyCODONE, oxyCODONE, prochlorperazine, sodium chloride 0.9%    Review of Systems   Constitutional: Positive for activity change, appetite change, fatigue and fever. Negative for chills.   HENT: Negative.    Respiratory: Negative for cough, shortness of breath and wheezing.    Cardiovascular: Positive for leg swelling. Negative for chest pain.   Gastrointestinal: Positive for abdominal distention, abdominal pain, constipation, nausea and vomiting.   Genitourinary: Negative for difficulty urinating.        Anuric   Musculoskeletal: Negative for arthralgias, back pain and myalgias.   Skin: Positive for wound. Negative for color change and rash.   Allergic/Immunologic: Positive for immunocompromised state.   Neurological: Positive for weakness. Negative for dizziness, seizures and headaches.   Psychiatric/Behavioral: Positive for decreased concentration. Negative for confusion. The patient is not nervous/anxious.    All other systems reviewed and are negative.    Objective:     Vital Signs (Most Recent):  Temp: 100.2 °F (37.9 °C) (01/18/18 1535)  Pulse: (!) 114 (01/18/18 1535)  Resp: 18 (01/18/18 1535)  BP: 116/63 (01/18/18 1535)  SpO2: 96 % (01/18/18 1535) Vital Signs (24h Range):  Temp:  [98.6 °F (37 °C)-102.4 °F (39.1 °C)] 100.2 °F (37.9 °C)  Pulse:  [] 114  Resp:  [16-18] 18  SpO2:  [96 %-98 %] 96 %  BP: (105-127)/(56-76) 116/63     Weight: 68 kg (149 lb 14.6 oz)  Body mass index is 24.2 kg/m².    Intake/Output - Last 3 Shifts       01/16 0700 - 01/17 0659 01/17 0700 - 01/18 0659 01/18 0700 - 01/19 0659    P.O. 790 180 19     I.V. (mL/kg) 100 (1.6) 400 (5.9)     Other 0 600     NG/  450    IV Piggyback 200  50    .7 493.2 320    Total Intake(mL/kg) 2044.7 (32.3) 1673.2 (24.6) 900 (13.2)    Urine (mL/kg/hr) 70 (0) 0 (0) 0 (0)    Emesis/NG output 0 (0) 0 (0) 0 (0)    Other 0 (0) 3107 (1.9)     Stool 0 (0) 0 (0) 0 (0)    Blood 0 (0) 0 (0)     Total Output 70 3107 0    Net +1974.7 -1433.8 +900           Urine Occurrence 0 x 0 x 0 x    Stool Occurrence 0 x 0 x 0 x    Emesis Occurrence 0 x 0 x 0 x          Physical Exam   Constitutional: He is oriented to person, place, and time. He appears well-developed. No distress.   Temporal and distal extremity muscle wasting   HENT:   Head: Normocephalic and atraumatic.   Mouth/Throat: No oropharyngeal exudate.   Eyes: EOM are normal. Pupils are equal, round, and reactive to light. Scleral icterus is present.   Neck: Normal range of motion. Neck supple. No JVD present. No thyromegaly present.   Cardiovascular: Normal rate, regular rhythm, normal heart sounds and intact distal pulses.    No murmur heard.  Pulmonary/Chest: Effort normal. No respiratory distress. He has decreased breath sounds in the right middle field, the right lower field and the left lower field. He has no wheezes. He exhibits no tenderness.   Diminished to RLL   Abdominal: Soft. Bowel sounds are normal. He exhibits distension and ascites. There is tenderness (mild). There is no rebound and no guarding.   Dressing to chevron.  Wd vac removed 1/12/18   Musculoskeletal: Normal range of motion. He exhibits edema (2+ LE edema). He exhibits no tenderness.   Neurological: He is alert and oriented to person, place, and time. He has normal reflexes.   Skin: Skin is warm and dry. Capillary refill takes 2 to 3 seconds. He is not diaphoretic. No erythema.   jaundice   Psychiatric: He has a normal mood and affect. His behavior is normal. Judgment and thought content normal. His mood appears not anxious.   Nursing note and vitals  reviewed.      Laboratory:  Immunosuppressants         Stop Route Frequency     cycloSPORINE modified (NEORAL) capsule 100 mg      -- Oral 2 times daily        CBC:     Recent Labs  Lab 01/18/18  0403   WBC 5.31   RBC 2.48*   HGB 7.7*   HCT 23.0*      MCV 93   MCH 31.0   MCHC 33.5     CMP:     Recent Labs  Lab 01/18/18  0403   GLU 96   CALCIUM 8.9   ALBUMIN 2.5*   PROT 5.3*      K 3.2*   CO2 23      BUN 9   CREATININE 2.9*   ALKPHOS 108   ALT <5*   AST 20   BILITOT 2.0*     Coagulation:   No results for input(s): PT, INR, APTT in the last 168 hours.  Labs within the past 24 hours have been reviewed.    Diagnostic Results:  I have personally reviewed all pertinent imaging studies.    Assessment/Plan:     * Fever    - unclear source of fever, previously thought to be related to peritonitis  - blood cultures prelim NGTD. No urine cx obtained as pt anuric.   - Pt underwent thoracentesis, paracentesis, and drain placement into fluid collection 1/11/18.   - All fluids reviewed and negative for infection. Cultures NGTD.  - ID was following have since signed off. Cefepime d/c 1/15.   - Again with fever today. Repeat blood cx NGTD. Resumed Vanc/Cefepime given fever 1/17.  - ID reconsulted  - CT C/A/P with large R pleural effusion and ascites --> both will be drained tomorrow  - Will also plan for liver bx with CMV immunostain per ID recs.         Liver transplanted    - post op course complicated by fevers and hyperbilirubinemia  - ERCP 12/6 with post-anastomosis stricture with stent placement  - AST/ALT normal. Tbili/alkphos remain elevated  - Liver US 1/8 showed 3.9cm complex fluid collection anterior to right lobe and moderate nonspecific complex ascites inferior to transplant.   - IR placed drain 1/11/18, cell count negative for infection.  - PBS consulted. PBS not comfortable proceeding with EUS with liver biopsy given fever and possible infectious process going on. ERCP 1/17 with stone and sludge.   Pt placed on Actigall.        Long-term use of immunosuppressant medication    - Maintenance IS with cyclosporine. MMF on hold for infections and neutropenia. Continue to check cyclosporine level daily. Assess for toxicity and adjust level as needed.        Prophylactic immunotherapy    - See long term use of immunosuppression.         At risk for opportunistic infections    - Hold Valcyte as WBC low.  CMV PCR 1/11 undetected.  - Continue Bactrim for PCP proph  - isavu for fungal prophylaxis 2/2 complicated post-op course        Other ascites    - Paracentesis performed 1/11/18 with 1600 ml removed.    - fluid negative for infection.   - Repeat para in AM        Severe protein-calorie malnutrition    - consult dietician.  - SHANE and tube feeds ordered but not tolerating  -TPN started 1/16/18.         Neutropenia    - Infectious work up negative so far, dc antibiotics 1/15.  - CMV PCR from 1/11/18 not detected. Monitor.  - Neupogen given 1/16/18 with improvement in ANC seen.         Nausea and vomiting    - h/o constipation, reports having regular BM's with bowel regimen  - vomiting episode x 1 at home. Anti-emetics ordered PRN.  - KUB repeated 1/14/18 given increased abdominal pain- mild, generalized bowel distention suggesting ileus.  Diet changed to clears as tolerated for bowel rest   - SHANE tube placed 1/16 for tube feeds. Not tolerating tube feeds, stopped 1/17/18.  - chronic constipation, Cont bowel regiment.           Pleural effusion, right    - CXR in ER with large right pleural effusion with subjective c/o worsening SOB  - thoracentesis completed- 1200 ml removed.  SOB with significant improvement.   - Cell count reviewed and negative for infection.   - resp even and non labored.  O2 sat 94-98%.  - Repeat thora in AM        Protein-calorie malnutrition    - Poor PO intake since transplant requiring short course of TPN during previous hospital stay.   - albumin remains decreased but appetite slowly improving  per pt.   - Dietary consulted. Will need to closely monitor PO intake.   - supplements also ordered.   - SHANE with tube feeding start 1/16. Stopped 1/17/18, was not tolerating.   - prealbumin 7 on 1/15.   - TPN started 1/16/18.         Peritonitis    - initially diagnosed in November but has since had improved cell counts in recent para's  - IR placed drain in fluid collection and paracentesis performed 1/11/18. Both fluids reviewed and negative for infection based on cell counts.   - F/u on cultures.         Anemia of chronic disease    - responded appropriately to 1 u PRBC given 1/12.  H/H stable.   - Trend H&H daily.         Delayed surgical wound healing    - wd vac removed 1/12/18. Wound care following, recommend dc wound vac. Wound healing well. Aquacel AG and Mepilex border dressing applied via wound care recs.           Acute renal failure with tubular necrosis    - HD resumed on previous admission. Now anuric and dialyzes M-W-F.  - Nephrology following.              VTE Risk Mitigation         Ordered     heparin (porcine) injection 1,000 Units  As needed (PRN)     Route:  Intra-Catheter        01/12/18 1017     heparin (porcine) injection 5,000 Units  Every 12 hours     Route:  Subcutaneous        01/12/18 0926     Medium Risk of VTE  Once      01/11/18 0351     Place sequential compression device  Until discontinued      01/11/18 0351          The patients clinical status was discussed at multidisplinary rounds, involving transplant surgery, transplant medicine, pharmacy, nursing, nutrition, and social work    Discharge Planning:  Not a candidate for discharge.    Geronimo Vera PA-C  Liver Transplant  [facility]

## 2021-05-04 ENCOUNTER — PATIENT MESSAGE (OUTPATIENT)
Dept: TRANSPLANT | Facility: CLINIC | Age: 31
End: 2021-05-04

## 2021-05-13 ENCOUNTER — TELEPHONE (OUTPATIENT)
Dept: TRANSPLANT | Facility: CLINIC | Age: 31
End: 2021-05-13

## 2021-05-18 ENCOUNTER — TELEPHONE (OUTPATIENT)
Dept: TRANSPLANT | Facility: CLINIC | Age: 31
End: 2021-05-18

## 2021-05-31 ENCOUNTER — PATIENT MESSAGE (OUTPATIENT)
Dept: TRANSPLANT | Facility: CLINIC | Age: 31
End: 2021-05-31

## 2021-05-31 LAB
EXT ALBUMIN: 4
EXT ALKALINE PHOSPHATASE: 229
EXT ALT: 25
EXT AST: 29
EXT BASOPHIL%: 0.9
EXT BILIRUBIN TOTAL: 0.5
EXT BUN: 33
EXT CALCIUM: 9.1
EXT CHLORIDE: 108
EXT CO2: 19
EXT CREATININE: 2.08 MG/DL
EXT EOSINOPHIL%: 9.3
EXT GFR MDRD NON AF AMER: 37
EXT GLUCOSE: 95
EXT HEMATOCRIT: 36.8
EXT HEMOGLOBIN: 12.6
EXT INR: 1
EXT LYMPH%: 39.9
EXT MONOCYTES%: 9.6
EXT PLATELETS: 171
EXT POTASSIUM: 3.9
EXT PROTEIN TOTAL: 8.5
EXT PT: 11.9
EXT SEGS%: 40.3
EXT SIROLIMUS LVL: 4.1
EXT SODIUM: 139 MMOL/L
EXT WBC: 5.8

## 2021-06-01 ENCOUNTER — TELEPHONE (OUTPATIENT)
Dept: TRANSPLANT | Facility: CLINIC | Age: 31
End: 2021-06-01

## 2021-06-01 ENCOUNTER — PATIENT MESSAGE (OUTPATIENT)
Dept: TRANSPLANT | Facility: CLINIC | Age: 31
End: 2021-06-01

## 2021-06-21 ENCOUNTER — TELEPHONE (OUTPATIENT)
Dept: TRANSPLANT | Facility: CLINIC | Age: 31
End: 2021-06-21

## 2021-06-21 ENCOUNTER — PATIENT MESSAGE (OUTPATIENT)
Dept: TRANSPLANT | Facility: CLINIC | Age: 31
End: 2021-06-21

## 2021-07-01 NOTE — ASSESSMENT & PLAN NOTE
HR=64 bpm, NIBP=80/43 mmhg, SpO2=92.0 %, Resp=12 B/min, EtCO2=30 mmHg, Apnea=1 Seconds · Reported about a fifth of whiskey daily since teenager  · Appreciate Psych and Hep assistance; currently high risk for liver transplant  · Follow up [facility]

## 2021-07-06 ENCOUNTER — TELEPHONE (OUTPATIENT)
Dept: TRANSPLANT | Facility: CLINIC | Age: 31
End: 2021-07-06

## 2021-07-07 LAB — EXT SIROLIMUS LVL: 4.6

## 2021-07-12 ENCOUNTER — OFFICE VISIT (OUTPATIENT)
Dept: TRANSPLANT | Facility: CLINIC | Age: 31
End: 2021-07-12
Payer: COMMERCIAL

## 2021-07-12 ENCOUNTER — PATIENT MESSAGE (OUTPATIENT)
Dept: TRANSPLANT | Facility: CLINIC | Age: 31
End: 2021-07-12

## 2021-07-12 DIAGNOSIS — D84.9 IMMUNOSUPPRESSION: Primary | ICD-10-CM

## 2021-07-12 DIAGNOSIS — Z94.4 STATUS POST LIVER TRANSPLANT: ICD-10-CM

## 2021-07-12 DIAGNOSIS — Z51.81 THERAPEUTIC DRUG MONITORING: ICD-10-CM

## 2021-07-12 DIAGNOSIS — N18.4 CKD (CHRONIC KIDNEY DISEASE) STAGE 4, GFR 15-29 ML/MIN: ICD-10-CM

## 2021-07-12 PROCEDURE — 99213 OFFICE O/P EST LOW 20 MIN: CPT | Mod: 95,,, | Performed by: INTERNAL MEDICINE

## 2021-07-12 PROCEDURE — 99213 PR OFFICE/OUTPT VISIT, EST, LEVL III, 20-29 MIN: ICD-10-PCS | Mod: 95,,, | Performed by: INTERNAL MEDICINE

## 2021-07-21 ENCOUNTER — PATIENT MESSAGE (OUTPATIENT)
Dept: TRANSPLANT | Facility: CLINIC | Age: 31
End: 2021-07-21

## 2021-07-21 ENCOUNTER — TELEPHONE (OUTPATIENT)
Dept: TRANSPLANT | Facility: CLINIC | Age: 31
End: 2021-07-21

## 2021-07-21 DIAGNOSIS — Z94.4 LIVER TRANSPLANTED: ICD-10-CM

## 2021-07-22 ENCOUNTER — TELEPHONE (OUTPATIENT)
Dept: NEPHROLOGY | Facility: CLINIC | Age: 31
End: 2021-07-22

## 2021-07-22 RX ORDER — SIROLIMUS 1 MG/1
3 TABLET, FILM COATED ORAL DAILY
Qty: 90 TABLET | Refills: 12 | Status: SHIPPED | OUTPATIENT
Start: 2021-07-22 | End: 2022-08-04 | Stop reason: SDUPTHER

## 2021-08-11 NOTE — ASSESSMENT & PLAN NOTE
- continue dronabinol for appetite stimulant  - po supplements  - appetite improving slowly  - dietary following  - prealbumin 10 on 5/14, previously 17 a month ago, recheck Monday 5/21   [de-identified] : Mr Seth is now 10 months PO TKA with excellent ROM and ambulation but persistent lateral knee pain that can even keep him up at night. x-rays look good with intact implants no obvious loosening. \par It may be Iliotibial band syndrome but with the slight effusion I just want to see no evidence of infection. \par Plan \par CBC sed rate\par PT\par F/U with Dr Arnoldo Díaz as I am not a total joint surgeon and a more comprehensive work-up may be needed to determine etiology. \par For Cervical radiculopathy we will begin PT as well. \par Discussed anit-inflammatories he doesn’t want to take those. \par \par

## 2021-08-26 ENCOUNTER — TELEPHONE (OUTPATIENT)
Dept: NEPHROLOGY | Facility: CLINIC | Age: 31
End: 2021-08-26

## 2021-09-14 NOTE — ASSESSMENT & PLAN NOTE
Patient with ongoing ATN since transplant.   Last HD 11/17. Was discharged on 11/22 on lasix 120 mg BID and metolazone 5 mg MWF. Holding diuretics for hyponatremia, infection.  Nephrology consulted for help with management.  Creatinine continues to improve with good UOP. Diuretics remain on hold. Awaiting final recs from Nephrology. Appreciate their assistance.          Consent (Lip)/Introductory Paragraph: The rationale for Mohs was explained to the patient and consent was obtained. The risks, benefits and alternatives to therapy were discussed in detail. Specifically, the risks of lip deformity, changes in the oral aperture, infection, scarring, bleeding, prolonged wound healing, incomplete removal, allergy to anesthesia, nerve injury and recurrence were addressed. Prior to the procedure, the treatment site was clearly identified and confirmed by the patient. All components of Universal Protocol/PAUSE Rule completed. Former smoker

## 2021-09-21 DIAGNOSIS — Z94.4 LIVER REPLACED BY TRANSPLANT: Primary | ICD-10-CM

## 2021-09-22 ENCOUNTER — OFFICE VISIT (OUTPATIENT)
Dept: NEPHROLOGY | Facility: CLINIC | Age: 31
End: 2021-09-22
Payer: MEDICARE

## 2021-09-22 VITALS
SYSTOLIC BLOOD PRESSURE: 138 MMHG | OXYGEN SATURATION: 99 % | BODY MASS INDEX: 21.33 KG/M2 | HEART RATE: 73 BPM | DIASTOLIC BLOOD PRESSURE: 80 MMHG | WEIGHT: 132.69 LBS | HEIGHT: 66 IN

## 2021-09-22 DIAGNOSIS — I10 ESSENTIAL HYPERTENSION: ICD-10-CM

## 2021-09-22 DIAGNOSIS — Z94.4 S/P LIVER TRANSPLANT: ICD-10-CM

## 2021-09-22 DIAGNOSIS — N18.32 STAGE 3B CHRONIC KIDNEY DISEASE: Primary | ICD-10-CM

## 2021-09-22 PROCEDURE — 3079F PR MOST RECENT DIASTOLIC BLOOD PRESSURE 80-89 MM HG: ICD-10-PCS | Mod: CPTII,S$GLB,, | Performed by: INTERNAL MEDICINE

## 2021-09-22 PROCEDURE — 3066F NEPHROPATHY DOC TX: CPT | Mod: CPTII,S$GLB,, | Performed by: INTERNAL MEDICINE

## 2021-09-22 PROCEDURE — 3008F BODY MASS INDEX DOCD: CPT | Mod: CPTII,S$GLB,, | Performed by: INTERNAL MEDICINE

## 2021-09-22 PROCEDURE — 99999 PR PBB SHADOW E&M-EST. PATIENT-LVL III: ICD-10-PCS | Mod: PBBFAC,,, | Performed by: INTERNAL MEDICINE

## 2021-09-22 PROCEDURE — 3075F PR MOST RECENT SYSTOLIC BLOOD PRESS GE 130-139MM HG: ICD-10-PCS | Mod: CPTII,S$GLB,, | Performed by: INTERNAL MEDICINE

## 2021-09-22 PROCEDURE — 99999 PR PBB SHADOW E&M-EST. PATIENT-LVL III: CPT | Mod: PBBFAC,,, | Performed by: INTERNAL MEDICINE

## 2021-09-22 PROCEDURE — 99204 PR OFFICE/OUTPT VISIT, NEW, LEVL IV, 45-59 MIN: ICD-10-PCS | Mod: S$GLB,,, | Performed by: INTERNAL MEDICINE

## 2021-09-22 PROCEDURE — 3008F PR BODY MASS INDEX (BMI) DOCUMENTED: ICD-10-PCS | Mod: CPTII,S$GLB,, | Performed by: INTERNAL MEDICINE

## 2021-09-22 PROCEDURE — 3075F SYST BP GE 130 - 139MM HG: CPT | Mod: CPTII,S$GLB,, | Performed by: INTERNAL MEDICINE

## 2021-09-22 PROCEDURE — 3079F DIAST BP 80-89 MM HG: CPT | Mod: CPTII,S$GLB,, | Performed by: INTERNAL MEDICINE

## 2021-09-22 PROCEDURE — 99204 OFFICE O/P NEW MOD 45 MIN: CPT | Mod: S$GLB,,, | Performed by: INTERNAL MEDICINE

## 2021-09-22 PROCEDURE — 1159F MED LIST DOCD IN RCRD: CPT | Mod: CPTII,S$GLB,, | Performed by: INTERNAL MEDICINE

## 2021-09-22 PROCEDURE — 3066F PR DOCUMENTATION OF TREATMENT FOR NEPHROPATHY: ICD-10-PCS | Mod: CPTII,S$GLB,, | Performed by: INTERNAL MEDICINE

## 2021-09-22 PROCEDURE — 1159F PR MEDICATION LIST DOCUMENTED IN MEDICAL RECORD: ICD-10-PCS | Mod: CPTII,S$GLB,, | Performed by: INTERNAL MEDICINE

## 2021-09-22 RX ORDER — LOSARTAN POTASSIUM 25 MG/1
50 TABLET ORAL DAILY
Qty: 90 TABLET | Refills: 6 | Status: SHIPPED | OUTPATIENT
Start: 2021-09-22 | End: 2022-06-23

## 2021-10-01 PROBLEM — F10.20 ALCOHOL DEPENDENCE: Status: RESOLVED | Noted: 2017-09-28 | Resolved: 2018-04-23

## 2021-11-09 ENCOUNTER — PATIENT MESSAGE (OUTPATIENT)
Dept: TRANSPLANT | Facility: CLINIC | Age: 31
End: 2021-11-09
Payer: MEDICARE

## 2021-11-09 ENCOUNTER — TELEPHONE (OUTPATIENT)
Dept: TRANSPLANT | Facility: CLINIC | Age: 31
End: 2021-11-09
Payer: MEDICARE

## 2021-11-10 LAB
EXT ALBUMIN: 4.2
EXT ALKALINE PHOSPHATASE: 166
EXT ALT: 27
EXT AST: 31
EXT BASOPHIL%: 0.9
EXT BILIRUBIN TOTAL: 0.5
EXT BUN: 26
EXT CALCIUM: 8.8
EXT CHLORIDE: 106
EXT CO2: 22
EXT CREATININE: 1.96 MG/DL
EXT EOSINOPHIL%: 7.1
EXT GFR MDRD NON AF AMER: 40
EXT GLUCOSE: 81
EXT HEMATOCRIT: 34.7
EXT HEMOGLOBIN: 12
EXT INR: 1
EXT LYMPH%: 34.5
EXT MONOCYTES%: 8.8
EXT PLATELETS: 77
EXT POTASSIUM: 4
EXT PROTEIN TOTAL: 7.6
EXT PT: 12.6
EXT SEGS%: 48.7
EXT SIROLIMUS LVL: 5.3
EXT SODIUM: 140 MMOL/L
EXT WBC: 4.4

## 2021-11-11 ENCOUNTER — PATIENT MESSAGE (OUTPATIENT)
Dept: TRANSPLANT | Facility: CLINIC | Age: 31
End: 2021-11-11
Payer: MEDICARE

## 2021-11-11 ENCOUNTER — TELEPHONE (OUTPATIENT)
Dept: TRANSPLANT | Facility: CLINIC | Age: 31
End: 2021-11-11
Payer: MEDICARE

## 2022-02-22 NOTE — PROGRESS NOTES
[facility]  Infectious Disease  Progress Note    Patient Name: Jhonny Diana  MRN: [id number]  Admission Date: 1/11/2018  Length of Stay: 56 days  Attending Physician: Nathanael Medina MD  Primary Care Provider: Primary Doctor No    Isolation Status: No active isolations  Assessment/Plan:      Fever    27-year-old male  - EtOH cirrhosis s/p DDLT 10/19/2017 CMV D+/R+  - Seizures on tacro, on cyclosporine and keppra  - ATN on HD MWF  - Multiple episodes of peritonitis s/p ex-laporoscopy 2/7/2018  - Persistent ascites / intraabdominal fluid collection (last para 3/6 with no peritonitis)  - Biliary strictures requiring multiple ERCPs with biliary stenting   - Pleural effusion s/p thoracentesis  - Malnutrition on TPN  - Erythematous, congested, nodular gastric mucosa 3/6/18 - biopsy pending  - Duodenal ulcer 3/6/2018 - biopsy pending  - AMS, likely toxic-metabolic, started while on ertapenem  - Partial SBO  - ESBL E. coli colonization  - Immunosuppression on prophylaxis - pentamidine, isavuconazole    Additional infectious evaluation has been negative (adeno 3/1, aspergillus 2/1, CMV 3/1, EBV 2/28, Indium scan 2/28).    Patient with no fevers while on ertapenem, but also with new onset of neurological symptoms including slurred speech, jerking, delirium. Yesterday, patient with recurrent fever once ertapenem discontinued (although ertapenem usually dosed at 21:00 and spiked fever at 20:00) suggesting uncontrolled bacterial infection, most likely from either biliary source or intraabdominal fluid collections.     Recommendation:  - Restart cefepime / metronidazole  - Follow-up repeat blood cultures  - Follow-up gastric / duodenal biopsy - send pathology for CMV, bacterial, AFB, fungal stains  - Agree with LP - send for cell count, protein, glucose, bacterial/fungal/AFB stain/culture, HSV PCR, CMV PCR, HHV6 PCR              Anticipated Disposition: clinical improvement    Thank you for your consult. I will  follow-up with patient. Please contact us if you have any additional questions.    Marcela Longoria MD  Infectious Disease  [facility]    Subjective:     Principal Problem:Fever    HPI: No notes on file  Interval History:   Waxing and waning mental status  Slurred speech  Aox3  Nausea and vomiting  Diffuse abdominal pain     Review of Systems   Constitutional: Positive for fever. Negative for chills and diaphoresis.   HENT: Negative for rhinorrhea and sore throat.    Respiratory: Negative for cough and shortness of breath.    Cardiovascular: Negative for chest pain and leg swelling.   Gastrointestinal: Positive for abdominal pain, nausea and vomiting. Negative for diarrhea.   Genitourinary: Negative for dysuria and hematuria.   Musculoskeletal: Negative for arthralgias and myalgias.   Skin: Negative for rash.   Neurological: Positive for speech difficulty. Negative for headaches.     Objective:     Vital Signs (Most Recent):  Temp: 100.2 °F (37.9 °C) (03/08/18 1625)  Pulse: 90 (03/08/18 1625)  Resp: 16 (03/08/18 1625)  BP: 133/80 (03/08/18 1625)  SpO2: (!) 93 % (03/08/18 1625) Vital Signs (24h Range):  Temp:  [99 °F (37.2 °C)-100.9 °F (38.3 °C)] 100.2 °F (37.9 °C)  Pulse:  [81-91] 90  Resp:  [16-20] 16  SpO2:  [93 %-96 %] 93 %  BP: (126-138)/(71-80) 133/80     Weight: 73.2 kg (161 lb 6 oz)  Body mass index is 26.05 kg/m².    Estimated Creatinine Clearance: 33.1 mL/min (A) (based on SCr of 3 mg/dL (H)).    Physical Exam   Constitutional: He is oriented to person, place, and time. He appears distressed.   HENT:   Head: Normocephalic and atraumatic.   Eyes: Conjunctivae and EOM are normal.   Neck: Normal range of motion. Neck supple.   Cardiovascular: Normal rate.    Pulmonary/Chest: Effort normal. No respiratory distress.   Abdominal: Soft. He exhibits no distension. There is tenderness. There is no guarding.   Musculoskeletal: Normal range of motion. He exhibits no edema.   Neurological: He is alert  and oriented to person, place, and time.   Skin: Skin is warm and dry. No rash noted. He is not diaphoretic. No erythema. There is pallor.   Psychiatric: He has a normal mood and affect. His behavior is normal.       Significant Labs: All pertinent labs within the past 24 hours have been reviewed.    Significant Imaging: I have reviewed all pertinent imaging results/findings within the past 24 hours.     Admission Reconciliation is Completed  Discharge Reconciliation is Completed

## 2022-03-08 ENCOUNTER — PATIENT MESSAGE (OUTPATIENT)
Dept: TRANSPLANT | Facility: CLINIC | Age: 32
End: 2022-03-08
Payer: MEDICARE

## 2022-03-08 NOTE — ASSESSMENT & PLAN NOTE
26yo man w/a history of childhood asthma and alcohol abuse (fifth whiskey daily >10yrs, active through admission) who was admitted on 9/27/2017 as a transfer from an OSH (first admitted 9/6) with 6-8wk progressively worsening fatigue, jaundice, abdominal pain, distension, BLE edema, and 20lb weight gain due to decompensated liver failure and recently diagnosed alcoholic cirrhosis (c/b HE, EV, portal HTN, scant ascites, no SBP, HRS). ID has been consulted to comment on a persistent leukocytosis and transplant candidacy. He remains critically ill at this time.    - may continue zosyn for now but will likely deescalate soon (well covered for possibility of aspiration seen on imaging)  - with decline, would consider empiric addition of fluconazole  - workup for leukocytosis has been appropriate and agree with hepatology that this may well be due to advanced liver failure -- will only add a serum crypto antigen to workup now given confusion although low suspicion (most likely all due to HE)  - transplant prep: will review serologic workup when finalized as much is still pending; will need vaccines updated including HAV, HBV, prevnar, and influenza  - transplant candidacy: pending review of workup detailed above; await final psych evaluation   no needs

## 2022-03-17 LAB
EXT ALBUMIN: 4.1
EXT ALKALINE PHOSPHATASE: 161
EXT ALT: 32
EXT AST: 27
EXT BASOPHIL%: 1.4
EXT BILIRUBIN TOTAL: 0.4
EXT BUN: 27
EXT CALCIUM: 8.9
EXT CHLORIDE: 107
EXT CO2: 22
EXT CREATININE: 1.61 MG/DL
EXT EOSINOPHIL%: 8.4
EXT GFR MDRD NON AF AMER: 50
EXT GLUCOSE: 87
EXT HEMATOCRIT: 33.7
EXT HEMOGLOBIN: 11.7
EXT INR: 1
EXT LYMPH%: 35.1
EXT MONOCYTES%: 8.8
EXT PLATELETS: 67
EXT POTASSIUM: 4.7
EXT PROTEIN TOTAL: 7.7
EXT PT: 12.1
EXT SEGS%: 46.3
EXT SIROLIMUS LVL: 4
EXT SODIUM: 139 MMOL/L
EXT WBC: 3.2

## 2022-03-29 ENCOUNTER — TELEPHONE (OUTPATIENT)
Dept: TRANSPLANT | Facility: CLINIC | Age: 32
End: 2022-03-29
Payer: MEDICARE

## 2022-03-29 ENCOUNTER — PATIENT MESSAGE (OUTPATIENT)
Dept: TRANSPLANT | Facility: CLINIC | Age: 32
End: 2022-03-29
Payer: MEDICARE

## 2022-03-29 NOTE — TELEPHONE ENCOUNTER
Labs are stable. No changes. Message sent   ----- Message from Darnell Julio MD sent at 3/18/2022 10:32 AM CDT -----  Liver transplant blood tests reviewed. Labs stable, no change in immunosuppression. Please continue to monitor liver tests per transplant protocol.

## 2022-05-11 ENCOUNTER — PATIENT MESSAGE (OUTPATIENT)
Dept: RESEARCH | Facility: CLINIC | Age: 32
End: 2022-05-11
Payer: MEDICARE

## 2022-05-26 ENCOUNTER — TELEPHONE (OUTPATIENT)
Dept: TRANSPLANT | Facility: CLINIC | Age: 32
End: 2022-05-26
Payer: MEDICARE

## 2022-05-26 ENCOUNTER — PATIENT MESSAGE (OUTPATIENT)
Dept: TRANSPLANT | Facility: CLINIC | Age: 32
End: 2022-05-26
Payer: MEDICARE

## 2022-06-08 LAB
CHOLESTEROL (LIPID PANEL): 191
EXT ALBUMIN: 4.2
EXT ALKALINE PHOSPHATASE: 158
EXT ALT: 32
EXT AST: 35
EXT BASOPHIL%: 1
EXT BILIRUBIN TOTAL: 0.6
EXT BUN: 26
EXT CALCIUM: 9
EXT CHLORIDE: 108
EXT CO2: 19
EXT CREATININE: 1.81 MG/DL
EXT EOSINOPHIL%: 5
EXT GFR MDRD NON AF AMER: 50
EXT GLUCOSE: 85
EXT HEMATOCRIT: 36.5
EXT HEMOGLOBIN: 12
EXT LYMPH%: 23
EXT MONOCYTES%: 9
EXT PLATELETS: 44
EXT POTASSIUM: 4.2
EXT PROTEIN TOTAL: 7.1
EXT SEGS%: 62
EXT SODIUM: 140 MMOL/L
EXT WBC: 4.6
HDLC SERPL-MCNC: 62 MG/DL
LDLC SERPL CALC-MCNC: 108 MG/DL
T4 FREE SERPL-MCNC: 1.15 NG/DL
TRIGLYCERIDE (LIPID PAN): 116
TSH: 1.89
VITAMIN B12: 687

## 2022-06-09 NOTE — CONSULTS
Placed 20 gauge 1 3/4 PIV in RFA using ultrasound guidance   X-ray of abdomen is showing a large amount of stool  -No signs of obstruction noted  -Recommend increasing fluid and fiber  Take MiraLAX once a day to help with having regular stool

## 2022-06-10 ENCOUNTER — TELEPHONE (OUTPATIENT)
Dept: TRANSPLANT | Facility: CLINIC | Age: 32
End: 2022-06-10
Payer: MEDICARE

## 2022-06-10 NOTE — TELEPHONE ENCOUNTER
Writer left patient communication via VM that laboratory did not seem to have drawn a sirolimus level and txp team needs patient to go and have it drawn as soon as he can.    Writer also contacted alternate number and spoke with patients mother whom states she will pass along the message.

## 2022-06-10 NOTE — TELEPHONE ENCOUNTER
Writer returned call. Patient states he is going to go see a surgeon about a hernia repair and wants to discuss having his IS med changed. Patient advised that he first should see a surgeon to see if even a candidate for the hernia repair because they may recommend to him not to.  Patient given Txp team fax number to have his consulting surgeon send us his notations to see if he will move forward with a hernia repair or not.        ----- Message from Padilla Santos sent at 6/10/2022 10:55 AM CDT -----  Regarding: Rx switch  Patient calling to switch to Prograf from sirolimus (RAPAMUNE) 1 MG Tab.      Call: [phone] (Mobile

## 2022-06-13 ENCOUNTER — PATIENT MESSAGE (OUTPATIENT)
Dept: NEPHROLOGY | Facility: CLINIC | Age: 32
End: 2022-06-13
Payer: MEDICARE

## 2022-06-13 ENCOUNTER — PATIENT MESSAGE (OUTPATIENT)
Dept: TRANSPLANT | Facility: CLINIC | Age: 32
End: 2022-06-13
Payer: MEDICARE

## 2022-06-13 LAB
EXT ALBUMIN: 4.2
EXT ALKALINE PHOSPHATASE: 130
EXT ALT: 24
EXT AST: 25
EXT BILIRUBIN TOTAL: 0.7
EXT BUN: 30
EXT CALCIUM: 9
EXT CHLORIDE: 107
EXT CO2: 19
EXT CREATININE: 1.9 MG/DL
EXT EOSINOPHIL%: 6.3
EXT GLUCOSE: 99
EXT HEMATOCRIT: 34.3
EXT HEMOGLOBIN: 11.6
EXT INR: 1
EXT LYMPH%: 18.1
EXT MONOCYTES%: 8.3
EXT PLATELETS: 88
EXT POTASSIUM: 4.1
EXT PROTEIN TOTAL: 7.9
EXT PT: 12.6
EXT SEGS%: 66.3
EXT SIROLIMUS LVL: 4
EXT SODIUM: 138 MMOL/L
EXT WBC: 5.9

## 2022-06-20 ENCOUNTER — TELEPHONE (OUTPATIENT)
Dept: TRANSPLANT | Facility: CLINIC | Age: 32
End: 2022-06-20
Payer: MEDICARE

## 2022-06-20 NOTE — TELEPHONE ENCOUNTER
Labs reviewed and are stable, continue q 6 months. Letter sent.       ----- Message from Darnell Julio MD sent at 6/20/2022  1:00 PM CDT -----  Liver transplant blood tests reviewed. Labs stable, no change in immunosuppression. Please continue to monitor liver tests per transplant protocol.

## 2022-06-20 NOTE — LETTER
June 20, 2022    Jhonny Diana  [address] [place] [address]          Dear Jhonny Diana:  MRN: [id number]    This is a follow up to your recent labs, your lab results were stable.  There are no medicine changes.  Please have your labs drawn again on 12/19/22.      We have adjusted our guideline and will now only monitor post transplant labs every 6 months for patients over 3 years. Please let us know if there is any change in your health or you have any concerns during that time.     If you cannot have your labs drawn on the scheduled date, it is your responsibility to call the transplant department to reschedule.  Please call [phone] and ask to speak to Lubna ROBERSON -  for all scheduling requests.     Sincerely,        Your Liver Transplant Coordinator    [facility]  [facility] [place] [address]  [phone]

## 2022-07-30 DIAGNOSIS — Z94.4 LIVER TRANSPLANTED: ICD-10-CM

## 2022-07-30 RX ORDER — SIROLIMUS 1 MG/1
3 TABLET, FILM COATED ORAL DAILY
Qty: 90 TABLET | Refills: 12 | Status: CANCELLED | OUTPATIENT
Start: 2022-07-30

## 2022-08-05 ENCOUNTER — PATIENT MESSAGE (OUTPATIENT)
Dept: HEPATOLOGY | Facility: CLINIC | Age: 32
End: 2022-08-05
Payer: MEDICARE

## 2022-09-19 NOTE — NURSING
Pt arrived back to [facility] 8068 via stretcher from CT. Will continue to monitor.   
Pt transported to CT via stretcher with transport.  Telemetry monitoring notified. Will continue to monitor.   
Starting oral contrast at 6pm.  Pt instructed to drink by 7pm, states understanding.  
done

## 2022-10-03 ENCOUNTER — PATIENT MESSAGE (OUTPATIENT)
Dept: SURGERY | Facility: CLINIC | Age: 32
End: 2022-10-03
Payer: MEDICARE

## 2022-10-07 ENCOUNTER — OFFICE VISIT (OUTPATIENT)
Dept: SURGERY | Facility: CLINIC | Age: 32
End: 2022-10-07
Payer: MEDICARE

## 2022-10-07 VITALS
HEART RATE: 82 BPM | OXYGEN SATURATION: 100 % | WEIGHT: 141.13 LBS | HEIGHT: 66 IN | SYSTOLIC BLOOD PRESSURE: 131 MMHG | TEMPERATURE: 98 F | DIASTOLIC BLOOD PRESSURE: 63 MMHG | BODY MASS INDEX: 22.68 KG/M2

## 2022-10-07 DIAGNOSIS — K43.2 INCISIONAL HERNIA, WITHOUT OBSTRUCTION OR GANGRENE: Primary | ICD-10-CM

## 2022-10-07 PROCEDURE — 99999 PR PBB SHADOW E&M-EST. PATIENT-LVL III: ICD-10-PCS | Mod: PBBFAC,,, | Performed by: STUDENT IN AN ORGANIZED HEALTH CARE EDUCATION/TRAINING PROGRAM

## 2022-10-07 PROCEDURE — 3075F PR MOST RECENT SYSTOLIC BLOOD PRESS GE 130-139MM HG: ICD-10-PCS | Mod: CPTII,S$GLB,, | Performed by: STUDENT IN AN ORGANIZED HEALTH CARE EDUCATION/TRAINING PROGRAM

## 2022-10-07 PROCEDURE — 1159F MED LIST DOCD IN RCRD: CPT | Mod: CPTII,S$GLB,, | Performed by: STUDENT IN AN ORGANIZED HEALTH CARE EDUCATION/TRAINING PROGRAM

## 2022-10-07 PROCEDURE — 99214 OFFICE O/P EST MOD 30 MIN: CPT | Mod: S$GLB,,, | Performed by: STUDENT IN AN ORGANIZED HEALTH CARE EDUCATION/TRAINING PROGRAM

## 2022-10-07 PROCEDURE — 3078F DIAST BP <80 MM HG: CPT | Mod: CPTII,S$GLB,, | Performed by: STUDENT IN AN ORGANIZED HEALTH CARE EDUCATION/TRAINING PROGRAM

## 2022-10-07 PROCEDURE — 4010F ACE/ARB THERAPY RXD/TAKEN: CPT | Mod: CPTII,S$GLB,, | Performed by: STUDENT IN AN ORGANIZED HEALTH CARE EDUCATION/TRAINING PROGRAM

## 2022-10-07 PROCEDURE — 3008F BODY MASS INDEX DOCD: CPT | Mod: CPTII,S$GLB,, | Performed by: STUDENT IN AN ORGANIZED HEALTH CARE EDUCATION/TRAINING PROGRAM

## 2022-10-07 PROCEDURE — 3078F PR MOST RECENT DIASTOLIC BLOOD PRESSURE < 80 MM HG: ICD-10-PCS | Mod: CPTII,S$GLB,, | Performed by: STUDENT IN AN ORGANIZED HEALTH CARE EDUCATION/TRAINING PROGRAM

## 2022-10-07 PROCEDURE — 3075F SYST BP GE 130 - 139MM HG: CPT | Mod: CPTII,S$GLB,, | Performed by: STUDENT IN AN ORGANIZED HEALTH CARE EDUCATION/TRAINING PROGRAM

## 2022-10-07 PROCEDURE — 3008F PR BODY MASS INDEX (BMI) DOCUMENTED: ICD-10-PCS | Mod: CPTII,S$GLB,, | Performed by: STUDENT IN AN ORGANIZED HEALTH CARE EDUCATION/TRAINING PROGRAM

## 2022-10-07 PROCEDURE — 1159F PR MEDICATION LIST DOCUMENTED IN MEDICAL RECORD: ICD-10-PCS | Mod: CPTII,S$GLB,, | Performed by: STUDENT IN AN ORGANIZED HEALTH CARE EDUCATION/TRAINING PROGRAM

## 2022-10-07 PROCEDURE — 99214 PR OFFICE/OUTPT VISIT, EST, LEVL IV, 30-39 MIN: ICD-10-PCS | Mod: S$GLB,,, | Performed by: STUDENT IN AN ORGANIZED HEALTH CARE EDUCATION/TRAINING PROGRAM

## 2022-10-07 PROCEDURE — 4010F PR ACE/ARB THEARPY RXD/TAKEN: ICD-10-PCS | Mod: CPTII,S$GLB,, | Performed by: STUDENT IN AN ORGANIZED HEALTH CARE EDUCATION/TRAINING PROGRAM

## 2022-10-07 PROCEDURE — 99999 PR PBB SHADOW E&M-EST. PATIENT-LVL III: CPT | Mod: PBBFAC,,, | Performed by: STUDENT IN AN ORGANIZED HEALTH CARE EDUCATION/TRAINING PROGRAM

## 2022-10-07 NOTE — PROGRESS NOTES
General Surgery Office Visit   History and Physical    Patient Name: Jhonny Diana  YOB: 1990 (32 y.o.)  MRN: [id number]  Today's Date: 10/07/2022    Referring Md:   Aaareferral Self  No address on file    SUBJECTIVE:     Chief Complaint: umbilical hernia    History of Present Illness:  Jhonny Diana is a 32 y.o. male with PMHx of HTN, liver transplant (2017), seizures (while on Prograf, none since 2017) who presents to the clinic today complaining of umbilical hernia which he first noticed in 2019. Hernia bothers patient often, and has learned to work around the pain. Patient states the sxs are aggravated by movement and alleviated by reclining.  Pt has been wearing a hernia belt to control the hernia. He denies fever, chills, unintentional weight loss, n/v/d, constipation, hematochezia, dysuria, hematuria, CP, SOB, and all other symptoms. Patient reports being compliant with home medication regimen. Pt has history of liver transplant 2017 and takes sirolimus.     Patient denies personal history of MI, CVA, lung disease, DM  Previous abdominal surgeries include: appendectomy, liver transplant  Denies alcohol, tobacco, and elicit drug use.   Not currently on any anticoagulants      Review of patient's allergies indicates:   Allergen Reactions    Bactrim [sulfamethoxazole-trimethoprim] Other (See Comments)     Mookie Trell Syndrome       Past Medical History:   Diagnosis Date    Abdominal pain 5/27/2018    Alcoholic cirrhosis 8/14/2018    Alcoholic hepatitis with ascites     Anemia     CKD (chronic kidney disease) stage 3, GFR 30-59 ml/min 8/14/2018    Encounter for blood transfusion     multiple blood transfusions    ESRD on dialysis     History of hematemesis 9/28/2017    Hypertension     Seizures     Substance abuse     alcohol and opioids    Thrombocytopenia     Transplanted liver     10/2017     Past Surgical History:   Procedure Laterality Date    APPENDECTOMY      CENTRAL VENOUS CATHETER TUNNELED  "INSERTION DOUBLE LUMEN      inserted and removed    ERCP      ERCP N/A 2018    Procedure: ERCP;  Surgeon: Solis Villanueva MD;  Location: [facility] (2ND FLR);  Service: Endoscopy;  Laterality: N/A;  dialysis/cirrhosis/labs prior to ERCP/svn  platelet    ESOPHAGOGASTRODUODENOSCOPY      ESOPHAGOGASTRODUODENOSCOPY N/A 2019    Procedure: EGD (ESOPHAGOGASTRODUODENOSCOPY);  Surgeon: Sheri Siddiqui MD;  [facility]  Service: Endoscopy;  Laterality: N/A;    LIVER TRANSPLANT      10/2017     Family History   Problem Relation Age of Onset    No Known Problems Mother     Cancer Father         Lung cancer    Alcohol abuse Father     No Known Problems Sister     No Known Problems Brother     Kidney disease Neg Hx      Social History     Tobacco Use    Smoking status: Former     Packs/day: 1.00     Years: 10.00     Pack years: 10.00     Types: Cigarettes     Quit date: 2017     Years since quittin.6    Smokeless tobacco: Never   Substance Use Topics    Alcohol use: No     Comment: a fifth of liquor daily for years, cut back over last 2 months    Drug use: No        Review of Systems:  Review of Systems   Constitutional:  Negative for chills and fever.   HENT:  Negative for hearing loss and sore throat.    Eyes:  Negative for blurred vision and double vision.   Respiratory:  Negative for cough and shortness of breath.    Cardiovascular:  Negative for chest pain.   Gastrointestinal:  Negative for heartburn and nausea.   Genitourinary:  Negative for dysuria and hematuria.   Musculoskeletal:  Negative for myalgias and neck pain.   Neurological:  Negative for dizziness and headaches.     OBJECTIVE:     Vital Signs (Most Recent)  /63 (BP Location: Left arm, Patient Position: Sitting)   Pulse 82   Temp 98.2 °F (36.8 °C) (Oral)   Ht 5' 6" (1.676 m)   Wt 64 kg (141 lb 1.5 oz)   SpO2 100%   BMI 22.77 kg/m²     Physical Exam  Vitals and nursing note reviewed.   Constitutional:       " Appearance: Normal appearance. He is not ill-appearing.   HENT:      Head: Normocephalic.      Mouth/Throat:      Mouth: Mucous membranes are moist.      Pharynx: Oropharynx is clear.   Eyes:      General: No scleral icterus.     Extraocular Movements: Extraocular movements intact.      Conjunctiva/sclera: Conjunctivae normal.   Cardiovascular:      Rate and Rhythm: Normal rate.      Pulses: Normal pulses.   Pulmonary:      Effort: Pulmonary effort is normal. No respiratory distress.      Breath sounds: No wheezing.   Abdominal:      General: Abdomen is flat. Bowel sounds are normal. There is no distension.      Palpations: Abdomen is soft.   Musculoskeletal:         General: No swelling or tenderness. Normal range of motion.      Cervical back: Normal range of motion.      Comments: ~7.5 cm umbilical hernia present   Skin:     General: Skin is warm and dry.      Coloration: Skin is not jaundiced or pale.   Neurological:      General: No focal deficit present.      Mental Status: He is alert and oriented to person, place, and time.   Psychiatric:         Mood and Affect: Mood normal.         Labs:     Lab Results   Component Value Date    WBC 5.68 12/11/2019    HGB 8.8 (L) 12/11/2019    HCT 29.2 (L) 12/11/2019    MCV 89 12/11/2019     12/11/2019         CMP  Sodium   Date Value Ref Range Status   12/11/2019 139 136 - 145 mmol/L Final     Potassium   Date Value Ref Range Status   12/11/2019 4.1 3.5 - 5.1 mmol/L Final     Chloride   Date Value Ref Range Status   12/11/2019 108 95 - 110 mmol/L Final     CO2   Date Value Ref Range Status   12/11/2019 21 (L) 23 - 29 mmol/L Final     Glucose   Date Value Ref Range Status   12/11/2019 82 70 - 110 mg/dL Final     BUN   Date Value Ref Range Status   12/11/2019 12 6 - 20 mg/dL Final     Creatinine   Date Value Ref Range Status   12/11/2019 1.9 (H) 0.5 - 1.4 mg/dL Final     Calcium   Date Value Ref Range Status   12/11/2019 8.4 (L) 8.7 - 10.5 mg/dL Final     Total  Protein   Date Value Ref Range Status   12/11/2019 7.3 6.0 - 8.4 g/dL Final     Albumin   Date Value Ref Range Status   12/11/2019 2.6 (L) 3.5 - 5.2 g/dL Final     Total Bilirubin   Date Value Ref Range Status   12/11/2019 0.3 0.1 - 1.0 mg/dL Final     Comment:     For infants and newborns, interpretation of results should be based  on gestational age, weight and in agreement with clinical  observations.  Premature Infant recommended reference ranges:  Up to 24 hours.............<8.0 mg/dL  Up to 48 hours............<12.0 mg/dL  3-5 days..................<15.0 mg/dL  6-29 days.................<15.0 mg/dL       Alkaline Phosphatase   Date Value Ref Range Status   12/11/2019 139 (H) 55 - 135 U/L Final     AST   Date Value Ref Range Status   12/11/2019 22 10 - 40 U/L Final     ALT   Date Value Ref Range Status   12/11/2019 7 (L) 10 - 44 U/L Final     Anion Gap   Date Value Ref Range Status   12/11/2019 10 8 - 16 mmol/L Final     eGFR if    Date Value Ref Range Status   12/11/2019 53.5 (A) >60 mL/min/1.73 m^2 Final     eGFR if non    Date Value Ref Range Status   12/11/2019 46.3 (A) >60 mL/min/1.73 m^2 Final     Comment:     Calculation used to obtain the estimated glomerular filtration  rate (eGFR) is the CKD-EPI equation.                  ASSESSMENT/PLAN:     There are no diagnoses linked to this encounter.    Jhonny Diana is a 32 y.o. male w PMH of liver transplant in 2017 with an umbilical hernia.    - Schedule for umbilical hernia repair   - Contact hepatology for timing of surgery and med recs regarding sirolumus vs tacrolimus.  - Patient instructed to call clinic with any questions, concerns, or new symptoms  - Patient understands and in agreement with plan; all questions answered    Case discussed with Dr. Hood.      LUISANA Rodriguez M.D.  General Surgery PGY-1  [facility]

## 2022-10-10 ENCOUNTER — PATIENT MESSAGE (OUTPATIENT)
Dept: HEPATOLOGY | Facility: CLINIC | Age: 32
End: 2022-10-10
Payer: MEDICARE

## 2022-10-18 DIAGNOSIS — Z94.4 LIVER TRANSPLANTED: Primary | ICD-10-CM

## 2022-10-18 RX ORDER — TACROLIMUS 1 MG/1
2 CAPSULE ORAL EVERY 12 HOURS
Qty: 120 CAPSULE | Refills: 5 | Status: SHIPPED | OUTPATIENT
Start: 2022-10-18 | End: 2022-10-27 | Stop reason: SDUPTHER

## 2022-10-18 NOTE — TELEPHONE ENCOUNTER
"Pt planning to undergo hernia repair and will need to be transitioned off Sirolimus at least 3 weeks prior. Per Dr. Julio : "We can start prograf at 2 mg twice daily and check levels in 3 days."     Will send script and send lab orders to [facility].   Pt notified and verbalizes understanding of plan.         ----- Message from Darnell Julio MD sent at 10/18/2022  7:06 AM CDT -----  Regarding: RE: Surgery clearance  Yes we are planning to switch him to Prograf    ----- Message -----  From: Carey Lutz RN  Sent: 10/17/2022   2:18 PM CDT  To: Darnell Julio MD, Farzad Patel RN  Subject: FW: Surgery clearance                            Robert PandaMr. Diana is on a medication called Sirolimus that can cause delayed wound healing. Ideally we like to transition pts to another immunosuppressant 3 weeks prior to any planned surgery. We will hopefully get that completed in a couple of days. I will also forward this to Dr. Julio to comment on clearance for hernia repair in regards to liver transplant.   Thanks  ----- Message -----  From: Steffanie Cordero RN  Sent: 10/13/2022   6:07 PM CDT  To: Carey Lutz RN  Subject: FW: Surgery clearance                            Oh my gosh so sorry just getting to this      ----- Message -----  From: Farzad Patel RN  Sent: 10/7/2022   3:49 PM CDT  To: Steffanie Cordero RN  Subject: Surgery clearance                                Mr. Diana was seen by Dr. Hood today to discuss his hernia repair. Dr. Hood is ok with going forward with the hernia repair but wanted me to reach out to the transplant team for the ok on their end. The patient also expressed want to change a medication prior to the surgery.     SISSY Panda  [phone]          "

## 2022-10-20 ENCOUNTER — TELEPHONE (OUTPATIENT)
Dept: TRANSPLANT | Facility: CLINIC | Age: 32
End: 2022-10-20
Payer: MEDICARE

## 2022-10-20 NOTE — TELEPHONE ENCOUNTER
Received update from pt, he is start Tacrolimus today and has discontinued the Sirolimus. Will have labs 10/24.

## 2022-10-24 ENCOUNTER — PATIENT MESSAGE (OUTPATIENT)
Dept: SURGERY | Facility: CLINIC | Age: 32
End: 2022-10-24
Payer: MEDICARE

## 2022-10-24 ENCOUNTER — PATIENT MESSAGE (OUTPATIENT)
Dept: HEPATOLOGY | Facility: CLINIC | Age: 32
End: 2022-10-24
Payer: MEDICARE

## 2022-10-24 LAB
EXT ALBUMIN: 3.7
EXT ALKALINE PHOSPHATASE: 180
EXT ALT: 19
EXT AST: 21
EXT BILIRUBIN TOTAL: 0.5
EXT BUN: 35
EXT CALCIUM: 9.1
EXT CHLORIDE: 112
EXT CO2: 19
EXT CREATININE: 2.23 MG/DL
EXT EOSINOPHIL%: 5
EXT GLUCOSE: 98
EXT HEMATOCRIT: 31.2
EXT HEMOGLOBIN: 11
EXT LYMPH%: 48.4
EXT MONOCYTES%: 9.8
EXT PLATELETS: 73
EXT POTASSIUM: 4.4
EXT PROTEIN TOTAL: 7.3
EXT SEGS%: 36.5
EXT SODIUM: 142 MMOL/L
EXT TACROLIMUS LVL: 14.2
EXT WBC: 2.8

## 2022-10-26 ENCOUNTER — PATIENT MESSAGE (OUTPATIENT)
Dept: TRANSPLANT | Facility: CLINIC | Age: 32
End: 2022-10-26
Payer: MEDICARE

## 2022-10-26 ENCOUNTER — TELEPHONE (OUTPATIENT)
Dept: GASTROENTEROLOGY | Facility: CLINIC | Age: 32
End: 2022-10-26
Payer: MEDICARE

## 2022-10-27 DIAGNOSIS — Z94.4 LIVER TRANSPLANTED: ICD-10-CM

## 2022-10-27 RX ORDER — TACROLIMUS 1 MG/1
1 CAPSULE ORAL EVERY 12 HOURS
Qty: 60 CAPSULE | Refills: 5 | Status: SHIPPED | OUTPATIENT
Start: 2022-10-27 | End: 2022-11-04 | Stop reason: DRUGHIGH

## 2022-10-27 NOTE — TELEPHONE ENCOUNTER
Informed pt labs reviewed with Dr. Julio via secure chat. Instructed pt per MD will hold Tacrolimus for 2 days and will restart Sunday morning at 1 mg BID. Pt to repeat labs again on Monday 10/31.

## 2022-10-28 DIAGNOSIS — K42.9 UMBILICAL HERNIA WITHOUT OBSTRUCTION AND WITHOUT GANGRENE: Primary | ICD-10-CM

## 2022-11-02 LAB
EXT ALBUMIN: 3.9
EXT ALKALINE PHOSPHATASE: 163
EXT ALT: 26
EXT AST: 25
EXT BASOPHIL%: 1.1
EXT BILIRUBIN TOTAL: 0.5
EXT BUN: 32
EXT CALCIUM: 9
EXT CHLORIDE: 110
EXT CO2: 17
EXT CREATININE: 3.17 MG/DL
EXT EOSINOPHIL%: 4.8
EXT GFR MDRD NON AF AMER: 23
EXT GLUCOSE: 90
EXT HEMATOCRIT: 36
EXT HEMOGLOBIN: 12.5
EXT LYMPH%: 37.8
EXT MONOCYTES%: 11.9
EXT PLATELETS: 85
EXT POTASSIUM: 4.9
EXT PROTEIN TOTAL: 7.6
EXT SEGS%: 44.4
EXT SODIUM: 140 MMOL/L
EXT TACROLIMUS LVL: 10.5
EXT WBC: 5.1

## 2022-11-04 ENCOUNTER — PATIENT MESSAGE (OUTPATIENT)
Dept: TRANSPLANT | Facility: CLINIC | Age: 32
End: 2022-11-04
Payer: MEDICARE

## 2022-11-04 DIAGNOSIS — Z94.4 LIVER TRANSPLANTED: ICD-10-CM

## 2022-11-04 RX ORDER — TACROLIMUS 1 MG/1
1 CAPSULE ORAL DAILY
Qty: 30 CAPSULE | Refills: 11 | Status: SHIPPED | OUTPATIENT
Start: 2022-11-04 | End: 2022-11-08

## 2022-11-04 NOTE — TELEPHONE ENCOUNTER
Discussed with Dr. Julio that pt will be unable to get 0.5mg capsules of tacrolimus until next week. Per MD, pt to reduce dose to 1mg daily and repeat labs Monday, 11/7/22.    Called pt to discuss. No answer. LVM.  Sent my chart message with dose change and MD recommendations to see PCP for declining kidney function, to hydrate well daily, and repeat labs Monday 11/7/22.     ----- Message from Darnell Julio MD sent at 11/4/2022  8:26 AM CDT -----  Liver transplant blood tests reviewed.  Please continue to monitor liver tests per transplant protocol. Reduce Prograf to 1 mg and 0.5 mg. Check labs in 2 days. Hydration. Monitor Creat at PCP

## 2022-11-07 ENCOUNTER — PATIENT MESSAGE (OUTPATIENT)
Dept: HEPATOLOGY | Facility: CLINIC | Age: 32
End: 2022-11-07
Payer: MEDICARE

## 2022-11-08 DIAGNOSIS — Z94.4 LIVER TRANSPLANTED: Primary | ICD-10-CM

## 2022-11-08 RX ORDER — CYCLOSPORINE 25 MG/1
25 CAPSULE, LIQUID FILLED ORAL 2 TIMES DAILY
Qty: 60 CAPSULE | Refills: 3 | Status: SHIPPED | OUTPATIENT
Start: 2022-11-08 | End: 2022-11-09

## 2022-11-08 NOTE — TELEPHONE ENCOUNTER
Message received from pt and reviewed by Dr. Julio:     I have ceased taking the tacrolimus. I cannot tolerate the medication. It drastically increases nausea, seems to have contributed to rapid decline in kidney function, and I am experiencing a severe increase in pain. Please advise if switching to cyclosporine is advisable in this situation and if so send the prescription to [facility] in [place], [place]. I will still do labs later today to check kidney function, and I intend to see my primary care physician regarding the decreased kidney function and increased pain as advised previously. A prompt response regarding the cyclosporine would be appreciated as I am currently not taking any immunosuppressant pending your advice on how to handle the situation. Thank you for your assistance.            Advised pt he should not adjust his own medications prior to discussing with transplant office. Per Dr. Julio since pt has stopped Tacrolimus will start Cyclosporine at 25 mg BID. Pt to have labs today.

## 2022-11-09 DIAGNOSIS — Z94.4 LIVER TRANSPLANTED: Primary | ICD-10-CM

## 2022-11-09 NOTE — TELEPHONE ENCOUNTER
Pt notified office his symptoms have improved with the decreased Tacrolimus dose and he did not switch over to cyclosporione. Pt had labs yesterday which shows improved kidney function and normal LFTs, awaiting Tacrolimus level.

## 2022-11-10 RX ORDER — TACROLIMUS 1 MG/1
1 CAPSULE ORAL DAILY
Qty: 30 CAPSULE | Refills: 5 | Status: SHIPPED | OUTPATIENT
Start: 2022-11-10 | End: 2022-12-12

## 2022-11-11 LAB
EXT ALBUMIN: 4.2
EXT ALKALINE PHOSPHATASE: 166
EXT ALT: 33
EXT AST: 28
EXT BASOPHIL%: 2.5
EXT BILIRUBIN TOTAL: 0.7
EXT BUN: 40
EXT CALCIUM: 9.2
EXT CHLORIDE: 108
EXT CO2: 15
EXT CREATININE: 2.16 MG/DL
EXT EOSINOPHIL%: 5.1
EXT GFR MDRD NON AF AMER: 35
EXT GLUCOSE: 91
EXT HEMATOCRIT: 33.4
EXT HEMOGLOBIN: 12
EXT LYMPH%: 31.5
EXT MONOCYTES%: 11.8
EXT PLATELETS: 69
EXT POTASSIUM: 4.4
EXT PROTEIN TOTAL: 7.9
EXT SEGS%: 49.1
EXT SODIUM: 137 MMOL/L
EXT TACROLIMUS LVL: 5.7
EXT WBC: 6.1

## 2022-11-14 ENCOUNTER — PATIENT MESSAGE (OUTPATIENT)
Dept: TRANSPLANT | Facility: CLINIC | Age: 32
End: 2022-11-14
Payer: MEDICARE

## 2022-11-14 ENCOUNTER — TELEPHONE (OUTPATIENT)
Dept: TRANSPLANT | Facility: CLINIC | Age: 32
End: 2022-11-14
Payer: MEDICARE

## 2022-11-18 ENCOUNTER — TELEPHONE (OUTPATIENT)
Dept: SURGERY | Facility: CLINIC | Age: 32
End: 2022-11-18
Payer: MEDICARE

## 2022-11-18 ENCOUNTER — PATIENT MESSAGE (OUTPATIENT)
Dept: SURGERY | Facility: CLINIC | Age: 32
End: 2022-11-18
Payer: MEDICARE

## 2022-11-18 RX ORDER — DULOXETIN HYDROCHLORIDE 20 MG/1
20 CAPSULE, DELAYED RELEASE ORAL NIGHTLY
COMMUNITY

## 2022-11-18 RX ORDER — TIZANIDINE 4 MG/1
4 TABLET ORAL EVERY 6 HOURS PRN
COMMUNITY

## 2022-11-18 NOTE — PRE-PROCEDURE INSTRUCTIONS
PreOp Instructions given:   - Verbal medication information (what to hold and what to take)   - NPO guidelines 2300  - Arrival place directions given; time to be given the day before procedure by the   Surgeon's Office 0515 - [facility]  - Bathing with antibacterial soap   - Don't wear any jewelry or bring any valuables AM of surgery   - No makeup or moisturizer to face   - No perfume/cologne, powder, lotions or aftershave   Pt. verbalized understanding.   Pt denies any h/o Anesthesia/Sedation complications or side effects.  Pt reports he has been prescribed Post-op Pain medications/pain management MD

## 2022-11-21 ENCOUNTER — HOSPITAL ENCOUNTER (OUTPATIENT)
Facility: HOSPITAL | Age: 32
Discharge: HOME | End: 2022-11-21
Attending: STUDENT IN AN ORGANIZED HEALTH CARE EDUCATION/TRAINING PROGRAM | Admitting: STUDENT IN AN ORGANIZED HEALTH CARE EDUCATION/TRAINING PROGRAM
Payer: MEDICARE

## 2022-11-21 ENCOUNTER — ANESTHESIA (OUTPATIENT)
Dept: SURGERY | Facility: HOSPITAL | Age: 32
End: 2022-11-21
Payer: MEDICARE

## 2022-11-21 ENCOUNTER — ANESTHESIA EVENT (OUTPATIENT)
Dept: SURGERY | Facility: HOSPITAL | Age: 32
End: 2022-11-21
Payer: MEDICARE

## 2022-11-21 VITALS
OXYGEN SATURATION: 98 % | DIASTOLIC BLOOD PRESSURE: 62 MMHG | WEIGHT: 135 LBS | HEART RATE: 93 BPM | RESPIRATION RATE: 18 BRPM | BODY MASS INDEX: 21.79 KG/M2 | TEMPERATURE: 98 F | SYSTOLIC BLOOD PRESSURE: 128 MMHG

## 2022-11-21 DIAGNOSIS — K42.9 UMBILICAL HERNIA WITHOUT OBSTRUCTION AND WITHOUT GANGRENE: ICD-10-CM

## 2022-11-21 PROCEDURE — 63600175 PHARM REV CODE 636 W HCPCS: Performed by: STUDENT IN AN ORGANIZED HEALTH CARE EDUCATION/TRAINING PROGRAM

## 2022-11-21 PROCEDURE — 88302 PR  SURG PATH,LEVEL II: ICD-10-PCS | Mod: 26,,, | Performed by: PATHOLOGY

## 2022-11-21 PROCEDURE — 63600175 PHARM REV CODE 636 W HCPCS: Performed by: NURSE ANESTHETIST, CERTIFIED REGISTERED

## 2022-11-21 PROCEDURE — 37000008 HC ANESTHESIA 1ST 15 MINUTES: Performed by: STUDENT IN AN ORGANIZED HEALTH CARE EDUCATION/TRAINING PROGRAM

## 2022-11-21 PROCEDURE — 94761 N-INVAS EAR/PLS OXIMETRY MLT: CPT

## 2022-11-21 PROCEDURE — 71000016 HC POSTOP RECOV ADDL HR: Performed by: STUDENT IN AN ORGANIZED HEALTH CARE EDUCATION/TRAINING PROGRAM

## 2022-11-21 PROCEDURE — D9220A PRA ANESTHESIA: Mod: ANES,,, | Performed by: ANESTHESIOLOGY

## 2022-11-21 PROCEDURE — D9220A PRA ANESTHESIA: ICD-10-PCS | Mod: ANES,,, | Performed by: ANESTHESIOLOGY

## 2022-11-21 PROCEDURE — 25000003 PHARM REV CODE 250: Performed by: STUDENT IN AN ORGANIZED HEALTH CARE EDUCATION/TRAINING PROGRAM

## 2022-11-21 PROCEDURE — 36000706: Performed by: STUDENT IN AN ORGANIZED HEALTH CARE EDUCATION/TRAINING PROGRAM

## 2022-11-21 PROCEDURE — 49560 PR REPAIR INCISIONAL HERNIA,REDUCIBLE: CPT | Mod: ,,, | Performed by: STUDENT IN AN ORGANIZED HEALTH CARE EDUCATION/TRAINING PROGRAM

## 2022-11-21 PROCEDURE — 49568 PR IMPLANT MESH HERNIA REPAIR/DEBRIDEMENT CLOSURE: ICD-10-PCS | Mod: ,,, | Performed by: STUDENT IN AN ORGANIZED HEALTH CARE EDUCATION/TRAINING PROGRAM

## 2022-11-21 PROCEDURE — 63600175 PHARM REV CODE 636 W HCPCS: Performed by: ANESTHESIOLOGY

## 2022-11-21 PROCEDURE — 37000009 HC ANESTHESIA EA ADD 15 MINS: Performed by: STUDENT IN AN ORGANIZED HEALTH CARE EDUCATION/TRAINING PROGRAM

## 2022-11-21 PROCEDURE — 71000015 HC POSTOP RECOV 1ST HR: Performed by: STUDENT IN AN ORGANIZED HEALTH CARE EDUCATION/TRAINING PROGRAM

## 2022-11-21 PROCEDURE — 49560 PR REPAIR INCISIONAL HERNIA,REDUCIBLE: ICD-10-PCS | Mod: ,,, | Performed by: STUDENT IN AN ORGANIZED HEALTH CARE EDUCATION/TRAINING PROGRAM

## 2022-11-21 PROCEDURE — D9220A PRA ANESTHESIA: ICD-10-PCS | Mod: CRNA,,, | Performed by: NURSE ANESTHETIST, CERTIFIED REGISTERED

## 2022-11-21 PROCEDURE — D9220A PRA ANESTHESIA: Mod: CRNA,,, | Performed by: NURSE ANESTHETIST, CERTIFIED REGISTERED

## 2022-11-21 PROCEDURE — 49568 PR IMPLANT MESH HERNIA REPAIR/DEBRIDEMENT CLOSURE: CPT | Mod: ,,, | Performed by: STUDENT IN AN ORGANIZED HEALTH CARE EDUCATION/TRAINING PROGRAM

## 2022-11-21 PROCEDURE — 71000044 HC DOSC ROUTINE RECOVERY FIRST HOUR: Performed by: STUDENT IN AN ORGANIZED HEALTH CARE EDUCATION/TRAINING PROGRAM

## 2022-11-21 PROCEDURE — 36000707: Performed by: STUDENT IN AN ORGANIZED HEALTH CARE EDUCATION/TRAINING PROGRAM

## 2022-11-21 PROCEDURE — 88302 TISSUE EXAM BY PATHOLOGIST: CPT | Mod: 26,,, | Performed by: PATHOLOGY

## 2022-11-21 PROCEDURE — 25000003 PHARM REV CODE 250: Performed by: ANESTHESIOLOGY

## 2022-11-21 PROCEDURE — 88302 TISSUE EXAM BY PATHOLOGIST: CPT | Performed by: PATHOLOGY

## 2022-11-21 PROCEDURE — C1781 MESH (IMPLANTABLE): HCPCS | Performed by: STUDENT IN AN ORGANIZED HEALTH CARE EDUCATION/TRAINING PROGRAM

## 2022-11-21 PROCEDURE — 25000003 PHARM REV CODE 250: Performed by: NURSE ANESTHETIST, CERTIFIED REGISTERED

## 2022-11-21 DEVICE — MESH PATCH HERNIA VENTRALEX: Type: IMPLANTABLE DEVICE | Site: UMBILICAL | Status: FUNCTIONAL

## 2022-11-21 RX ORDER — ROCURONIUM BROMIDE 10 MG/ML
INJECTION, SOLUTION INTRAVENOUS
Status: DISCONTINUED | OUTPATIENT
Start: 2022-11-21 | End: 2022-11-21

## 2022-11-21 RX ORDER — HYDROCODONE BITARTRATE AND ACETAMINOPHEN 7.5; 325 MG/1; MG/1
1 TABLET ORAL
COMMUNITY
End: 2024-01-06

## 2022-11-21 RX ORDER — SODIUM CHLORIDE 9 MG/ML
INJECTION, SOLUTION INTRAVENOUS CONTINUOUS
Status: DISCONTINUED | OUTPATIENT
Start: 2022-11-21 | End: 2022-11-21 | Stop reason: HOSPADM

## 2022-11-21 RX ORDER — LIDOCAINE HYDROCHLORIDE 10 MG/ML
INJECTION, SOLUTION EPIDURAL; INFILTRATION; INTRACAUDAL; PERINEURAL
Status: DISCONTINUED
Start: 2022-11-21 | End: 2022-11-21 | Stop reason: HOSPADM

## 2022-11-21 RX ORDER — FENTANYL CITRATE 50 UG/ML
25 INJECTION, SOLUTION INTRAMUSCULAR; INTRAVENOUS EVERY 5 MIN PRN
Status: COMPLETED | OUTPATIENT
Start: 2022-11-21 | End: 2022-11-21

## 2022-11-21 RX ORDER — MIDAZOLAM HYDROCHLORIDE 1 MG/ML
INJECTION, SOLUTION INTRAMUSCULAR; INTRAVENOUS
Status: DISCONTINUED | OUTPATIENT
Start: 2022-11-21 | End: 2022-11-21

## 2022-11-21 RX ORDER — ONDANSETRON 2 MG/ML
INJECTION INTRAMUSCULAR; INTRAVENOUS
Status: DISCONTINUED | OUTPATIENT
Start: 2022-11-21 | End: 2022-11-21

## 2022-11-21 RX ORDER — CEFAZOLIN SODIUM/WATER 2 G/20 ML
2 SYRINGE (ML) INTRAVENOUS
Status: COMPLETED | OUTPATIENT
Start: 2022-11-21 | End: 2022-11-21

## 2022-11-21 RX ORDER — METHOCARBAMOL 500 MG/1
500 TABLET, FILM COATED ORAL 4 TIMES DAILY
Qty: 40 TABLET | Refills: 0 | Status: SHIPPED | OUTPATIENT
Start: 2022-11-21 | End: 2022-12-01

## 2022-11-21 RX ORDER — SODIUM CHLORIDE 0.9 % (FLUSH) 0.9 %
3 SYRINGE (ML) INJECTION
Status: DISCONTINUED | OUTPATIENT
Start: 2022-11-21 | End: 2022-11-21 | Stop reason: HOSPADM

## 2022-11-21 RX ORDER — BUPIVACAINE HYDROCHLORIDE 2.5 MG/ML
INJECTION, SOLUTION EPIDURAL; INFILTRATION; INTRACAUDAL
Status: DISCONTINUED | OUTPATIENT
Start: 2022-11-21 | End: 2022-11-21 | Stop reason: HOSPADM

## 2022-11-21 RX ORDER — PROPOFOL 10 MG/ML
VIAL (ML) INTRAVENOUS
Status: DISCONTINUED | OUTPATIENT
Start: 2022-11-21 | End: 2022-11-21

## 2022-11-21 RX ORDER — OXYCODONE HYDROCHLORIDE 5 MG/1
5 TABLET ORAL EVERY 6 HOURS PRN
Qty: 12 TABLET | Refills: 0 | Status: SHIPPED | OUTPATIENT
Start: 2022-11-21 | End: 2024-01-06

## 2022-11-21 RX ORDER — METHOCARBAMOL 500 MG/1
500 TABLET, FILM COATED ORAL 4 TIMES DAILY
Status: DISCONTINUED | OUTPATIENT
Start: 2022-11-21 | End: 2022-11-21

## 2022-11-21 RX ORDER — HYDROMORPHONE HYDROCHLORIDE 1 MG/ML
0.5 INJECTION, SOLUTION INTRAMUSCULAR; INTRAVENOUS; SUBCUTANEOUS
Status: COMPLETED | OUTPATIENT
Start: 2022-11-21 | End: 2022-11-21

## 2022-11-21 RX ORDER — LIDOCAINE HYDROCHLORIDE 10 MG/ML
INJECTION, SOLUTION INTRAVENOUS
Status: DISCONTINUED | OUTPATIENT
Start: 2022-11-21 | End: 2022-11-21

## 2022-11-21 RX ORDER — FENTANYL CITRATE 50 UG/ML
INJECTION, SOLUTION INTRAMUSCULAR; INTRAVENOUS
Status: DISCONTINUED | OUTPATIENT
Start: 2022-11-21 | End: 2022-11-21

## 2022-11-21 RX ORDER — METHOCARBAMOL 500 MG/1
500 TABLET, FILM COATED ORAL 4 TIMES DAILY
Status: DISCONTINUED | OUTPATIENT
Start: 2022-11-21 | End: 2022-11-21 | Stop reason: HOSPADM

## 2022-11-21 RX ORDER — OXYCODONE HYDROCHLORIDE 5 MG/1
5 TABLET ORAL ONCE AS NEEDED
Status: COMPLETED | OUTPATIENT
Start: 2022-11-21 | End: 2022-11-21

## 2022-11-21 RX ORDER — PROCHLORPERAZINE EDISYLATE 5 MG/ML
5 INJECTION INTRAMUSCULAR; INTRAVENOUS EVERY 30 MIN PRN
Status: DISCONTINUED | OUTPATIENT
Start: 2022-11-21 | End: 2022-11-21 | Stop reason: HOSPADM

## 2022-11-21 RX ORDER — DEXAMETHASONE SODIUM PHOSPHATE 4 MG/ML
INJECTION, SOLUTION INTRA-ARTICULAR; INTRALESIONAL; INTRAMUSCULAR; INTRAVENOUS; SOFT TISSUE
Status: DISCONTINUED | OUTPATIENT
Start: 2022-11-21 | End: 2022-11-21

## 2022-11-21 RX ADMIN — HYDROMORPHONE HYDROCHLORIDE 0.5 MG: 1 INJECTION, SOLUTION INTRAMUSCULAR; INTRAVENOUS; SUBCUTANEOUS at 12:11

## 2022-11-21 RX ADMIN — FENTANYL CITRATE 100 MCG: 50 INJECTION, SOLUTION INTRAMUSCULAR; INTRAVENOUS at 07:11

## 2022-11-21 RX ADMIN — FENTANYL CITRATE 25 MCG: 50 INJECTION INTRAMUSCULAR; INTRAVENOUS at 08:11

## 2022-11-21 RX ADMIN — METHOCARBAMOL 500 MG: 500 TABLET ORAL at 11:11

## 2022-11-21 RX ADMIN — HYDROMORPHONE HYDROCHLORIDE 0.5 MG: 1 INJECTION, SOLUTION INTRAMUSCULAR; INTRAVENOUS; SUBCUTANEOUS at 10:11

## 2022-11-21 RX ADMIN — HYDROMORPHONE HYDROCHLORIDE 0.5 MG: 1 INJECTION, SOLUTION INTRAMUSCULAR; INTRAVENOUS; SUBCUTANEOUS at 09:11

## 2022-11-21 RX ADMIN — MIDAZOLAM HYDROCHLORIDE 2 MG: 1 INJECTION, SOLUTION INTRAMUSCULAR; INTRAVENOUS at 06:11

## 2022-11-21 RX ADMIN — ROCURONIUM BROMIDE 10 MG: 10 INJECTION INTRAVENOUS at 07:11

## 2022-11-21 RX ADMIN — SUGAMMADEX 200 MG: 100 INJECTION, SOLUTION INTRAVENOUS at 08:11

## 2022-11-21 RX ADMIN — ROCURONIUM BROMIDE 35 MG: 10 INJECTION INTRAVENOUS at 07:11

## 2022-11-21 RX ADMIN — METHOCARBAMOL 500 MG: 500 TABLET ORAL at 09:11

## 2022-11-21 RX ADMIN — PROPOFOL 100 MG: 10 INJECTION, EMULSION INTRAVENOUS at 07:11

## 2022-11-21 RX ADMIN — OXYCODONE 5 MG: 5 TABLET ORAL at 10:11

## 2022-11-21 RX ADMIN — SODIUM CHLORIDE, SODIUM GLUCONATE, SODIUM ACETATE, POTASSIUM CHLORIDE, MAGNESIUM CHLORIDE, SODIUM PHOSPHATE, DIBASIC, AND POTASSIUM PHOSPHATE: .53; .5; .37; .037; .03; .012; .00082 INJECTION, SOLUTION INTRAVENOUS at 07:11

## 2022-11-21 RX ADMIN — SODIUM CHLORIDE: 0.9 INJECTION, SOLUTION INTRAVENOUS at 06:11

## 2022-11-21 RX ADMIN — ONDANSETRON 4 MG: 2 INJECTION INTRAMUSCULAR; INTRAVENOUS at 08:11

## 2022-11-21 RX ADMIN — Medication 2 G: at 07:11

## 2022-11-21 RX ADMIN — ROCURONIUM BROMIDE 20 MG: 10 INJECTION INTRAVENOUS at 07:11

## 2022-11-21 RX ADMIN — FENTANYL CITRATE 100 MCG: 50 INJECTION, SOLUTION INTRAMUSCULAR; INTRAVENOUS at 08:11

## 2022-11-21 RX ADMIN — FENTANYL CITRATE 50 MCG: 50 INJECTION, SOLUTION INTRAMUSCULAR; INTRAVENOUS at 07:11

## 2022-11-21 RX ADMIN — DEXAMETHASONE SODIUM PHOSPHATE 4 MG: 4 INJECTION, SOLUTION INTRAMUSCULAR; INTRAVENOUS at 07:11

## 2022-11-21 RX ADMIN — HYDROMORPHONE HYDROCHLORIDE 0.5 MG: 1 INJECTION, SOLUTION INTRAMUSCULAR; INTRAVENOUS; SUBCUTANEOUS at 11:11

## 2022-11-21 RX ADMIN — LIDOCAINE HYDROCHLORIDE 60 MG: 10 INJECTION, SOLUTION INTRAVENOUS at 07:11

## 2022-11-21 NOTE — OP NOTE
[facility]  General Surgery  Operative Note    DATE: 11/21/2022    PREOPERATIVE DIAGNOSIS: Incisional hernia repair with mesh implantation, reducible     POSTOPERATIVE DIAGNOSIS: Incisional hernia repair with mesh implantation, reducible     Procedure(s):  REPAIR, HERNIA, INCISIONAL OR VENTRAL, WITHOUT HISTORY OF PRIOR REPAIR   Mesh implantation for repair of incisional hernia   Excision of excessive skin and subcutaneous tissue    Surgeon(s) and Role:     * Denny Hood MD - Primary     * Saima White MD - Resident - Assisting    ANESTHESIA: General endotracheal anesthesia    FINDINGS: reducible incisional hernia defect of the upper midline fascia measuring around 4cm in size circumferentially     INDICATION: Mr. Diana is a 32 y.o. male who underwent orthotopic liver transplant in 10/2017 with subsequent exploratory laparoscopy with intra-abdominal fluid collection washed out in 02/2018 who presented to clinic for evaluation of a upper midline incisional abdominal wall defect.  Area was reducible and nontender with no overlying skin changes.  Patient did have a large amount of redundant abdominal wall skin overlying the defect.  On evaluation the defect measured about 4 cm in size.  We discussed elective repair with mesh implantation and possible component separation depending on laxity of the abdominal wall.  Patient was understanding and wished to go forward with a blue proposed operation and informed consent was obtained.  Patient also discussed with his liver transplant coordinators on switching over from sirolimus to aid with wound healing in the postoperative phase.    PROCEDURE IN DETAIL: Patient was brought to the operating room where he was placed in supine position on the operating room table and underwent general anesthesia with endotracheal intubation without complication. The field was sterilely prepped out and draped in standard fashion, and a timeout identifying the  correct patient, placement, procedure, and preoperative antibiotics was called with everyone in agreement.    An upper midline incision overlying the incisional hernia was made with a 10. Blade scalpel through the level of the dermis.  This incision was opened for the full length of the hernia sac cranial to caudally with electrocautery.  On opening the sac the contents were identified and the bowel was easily able to be reduced into the peritoneal cavity.  The hernia sac was large and accompanied the excessive and redundant skin.  Using a marking pen, the redundant skin and subcutaneous tissue was marked for appropriate resection and using electrocautery excision of the subcutaneous tissue and skin was performed to allow for a nice linear defect that would be able to be reapproximated under minimal tension.  We then circumferentially excised the remainder of the hernia sac from the subcutaneous tissue down to the level of the fascial defect.  Once fully freed the hernia sac was sent off the field. The defect was manually inspected to ensure there was no bowel adherent to the underside of the fascia, and the defect was measured to be ~4cm in size circumferentially. A 8cm x 8cm ventral patch was folded in half and placed within the defect for repair. It was then parachuted up and pulled taught against the inside of the abdominal wall while being secure in place by eight 0 PDS stitches. The tails of the patch were then removed with heavy scissors. The fascia overlying the mesh was the closed with a running 0 PDS sutures. The wound was then irrigated and inspected for bleeding. Hemostasis was achieved using electrocautery. The deep dermis was then re-approximated using 3-0 vicryl suture laid in a deep dermal interrupted fashion. The skin was then closed using 4-0 monocryl suture in a running subcuticular fashion, cleaned with a wet-to-dry and then covered with dermabond.     This completed the proposed operation. All  instruments, needles, and sponge counts were reported as correct x2 by the nursing staff. Patient was extubated and awakened from general anesthesia without complication. He was sent to the recovery unit in stable condition.     EBL: 20mL.    COMPLICATIONS: none apparent.    SPECIMEN: 1. Excessive skin and subcutaneous tissue with hernia sac     DRAINS: none    DISPOSITION: PACU.    ATTESTATION:  I was present and scrubbed for the entire procedure including all critical portions of the procedure.    Denny Hood MD  Acute Care Surgery and Surgical Critical Care  [facility]  11/21/2022

## 2022-11-21 NOTE — H&P
General Surgery  History and Physical     Patient Name: Jhonny Diana  YOB: 1990 (32 y.o.)  MRN: [id number]  Today's Date: 10/07/2022     Referring Md:   Aaareferral Self  No address on file     SUBJECTIVE:      Chief Complaint: umbilical hernia     History of Present Illness:  Jhonny Diana is a 32 y.o. male with PMHx of HTN, liver transplant (2017), seizures (while on Prograf, none since 2017) who presents  today to [facility] for scheduled repair of painful umbilical hernia  which he first noticed in 2019. Hernia bothers patient often, and has learned to work around the pain. Patient states the sxs are aggravated by movement and alleviated by reclining.  Pt has been wearing a hernia belt to control the hernia. He denies fever, chills, unintentional weight loss, n/v/d, constipation, hematochezia, dysuria, hematuria, CP, SOB, and all other symptoms. Patient reports being compliant with home medication regimen. Pt has history of liver transplant 2017 and takes sirolimus.      Patient denies personal history of MI, CVA, lung disease, DM  Previous abdominal surgeries include: appendectomy, liver transplant  Denies alcohol, tobacco, and elicit drug use.   Not currently on any anticoagulants              Review of patient's allergies indicates:   Allergen Reactions    Bactrim [sulfamethoxazole-trimethoprim] Other (See Comments)       Mookie Trell Syndrome              Past Medical History:   Diagnosis Date    Abdominal pain 5/27/2018    Alcoholic cirrhosis 8/14/2018    Alcoholic hepatitis with ascites      Anemia      CKD (chronic kidney disease) stage 3, GFR 30-59 ml/min 8/14/2018    Encounter for blood transfusion       multiple blood transfusions    ESRD on dialysis      History of hematemesis 9/28/2017    Hypertension      Seizures      Substance abuse       alcohol and opioids    Thrombocytopenia      Transplanted liver       10/2017            Past Surgical History:   Procedure Laterality Date    APPENDECTOMY  "       CENTRAL VENOUS CATHETER TUNNELED INSERTION DOUBLE LUMEN         inserted and removed    ERCP        ERCP N/A 2018     Procedure: ERCP;  Surgeon: Solis Villanueva MD;  [facility]  Service: Endoscopy;  Laterality: N/A;  dialysis/cirrhosis/labs prior to ERCP/svn  platelet    ESOPHAGOGASTRODUODENOSCOPY        ESOPHAGOGASTRODUODENOSCOPY N/A 2019     Procedure: EGD (ESOPHAGOGASTRODUODENOSCOPY);  Surgeon: Sheri Siddiqui MD;  Location[facility]  Service: Endoscopy;  Laterality: N/A;    LIVER TRANSPLANT         10/2017            Family History   Problem Relation Age of Onset    No Known Problems Mother      Cancer Father           Lung cancer    Alcohol abuse Father      No Known Problems Sister      No Known Problems Brother      Kidney disease Neg Hx        Social History            Tobacco Use    Smoking status: Former       Packs/day: 1.00       Years: 10.00       Pack years: 10.00       Types: Cigarettes       Quit date: 2017       Years since quittin.6    Smokeless tobacco: Never   Substance Use Topics    Alcohol use: No       Comment: a fifth of liquor daily for years, cut back over last 2 months    Drug use: No         Review of Systems:  Review of Systems   Constitutional:  Negative for chills and fever.   HENT:  Negative for hearing loss and sore throat.    Eyes:  Negative for blurred vision and double vision.   Respiratory:  Negative for cough and shortness of breath.    Cardiovascular:  Negative for chest pain.   Gastrointestinal:  Negative for heartburn and nausea.   Genitourinary:  Negative for dysuria and hematuria.   Musculoskeletal:  Negative for myalgias and neck pain.   Neurological:  Negative for dizziness and headaches.      OBJECTIVE:      Vital Signs (Most Recent)  /63 (BP Location: Left arm, Patient Position: Sitting)   Pulse 82   Temp 98.2 °F (36.8 °C) (Oral)   Ht 5' 6" (1.676 m)   Wt 64 kg (141 lb 1.5 oz)   SpO2 100%   BMI 22.77 " kg/m²      Physical Exam  Vitals and nursing note reviewed.   Constitutional:       Appearance: Normal appearance. He is not ill-appearing.   HENT:      Head: Normocephalic.      Mouth/Throat:      Mouth: Mucous membranes are moist.      Pharynx: Oropharynx is clear.   Eyes:      General: No scleral icterus.     Extraocular Movements: Extraocular movements intact.      Conjunctiva/sclera: Conjunctivae normal.   Cardiovascular:      Rate and Rhythm: Normal rate.      Pulses: Normal pulses.   Pulmonary:      Effort: Pulmonary effort is normal. No respiratory distress.      Breath sounds: No wheezing.   Abdominal:      General: Abdomen is flat. Bowel sounds are normal. There is no distension.      Palpations: Abdomen is soft.   Musculoskeletal:         General: No swelling or tenderness. Normal range of motion.      Cervical back: Normal range of motion.      Comments: ~7.5 cm umbilical hernia present   Skin:     General: Skin is warm and dry.      Coloration: Skin is not jaundiced or pale.   Neurological:      General: No focal deficit present.      Mental Status: He is alert and oriented to person, place, and time.   Psychiatric:         Mood and Affect: Mood normal.            Labs: no recent relevant labs             ASSESSMENT/PLAN:      Jhonny Diana is a 32 y.o. male w PMH of liver transplant in 2017 with an umbilical hernia.     - Plan for repair of ventral hernia today Monday Nov 21, 2022 with Dr Hood    - Patient instructed to call clinic with any questions, concerns, or new symptoms  - Patient understands and in agreement with plan; all questions answered

## 2022-11-21 NOTE — PLAN OF CARE
Pt prepared for surgery per orders. Mother and significant other at bedside and to take pt's belongings while he is in surgery.  Full H&P and anesthesia consent needed for procedure today.

## 2022-11-21 NOTE — TRANSFER OF CARE
Anesthesia Transfer of Care Note    Patient: Jhonny Diana    Procedure(s) Performed: Procedure(s) (LRB):  REPAIR, HERNIA, INCISIONAL OR VENTRAL, WITHOUT HISTORY OF PRIOR REPAIR (N/A)    Patient location: [facility]    Transport from OR: Transported from OR on 6-10 L/min O2 by face mask with adequate spontaneous ventilation    Post pain: pain needs to be addressed    Post assessment: no apparent anesthetic complications    Post vital signs: stable    Level of consciousness: awake    Complications: none    Transfer of care protocol was followed      Last vitals:   Visit Vitals  BP (!) 143/85 (BP Location: Right arm, Patient Position: Lying)   Pulse 85   Temp 37.1 °C (98.8 °F) (Oral)   Resp 18   Wt 61.2 kg (135 lb)   SpO2 100%   BMI 21.79 kg/m²

## 2022-11-21 NOTE — ANESTHESIA POSTPROCEDURE EVALUATION
Anesthesia Post Evaluation    Patient: Jhonny Diana    Procedure(s) Performed: Procedure(s) (LRB):  REPAIR, HERNIA, INCISIONAL OR VENTRAL, WITHOUT HISTORY OF PRIOR REPAIR (N/A)    Final Anesthesia Type: general      Patient location during evaluation: PACU  Patient participation: Yes- Able to Participate  Level of consciousness: awake and alert  Post-procedure vital signs: reviewed and stable  Pain management: adequate  Airway patency: patent    PONV status at discharge: No PONV  Anesthetic complications: no      Cardiovascular status: blood pressure returned to baseline  Respiratory status: unassisted  Hydration status: euvolemic  Follow-up not needed.          Vitals Value Taken Time   /81 11/21/22 1102   Temp 36.5 °C (97.7 °F) 11/21/22 0840   Pulse 76 11/21/22 1106   Resp 16 11/21/22 1035   SpO2 99 % 11/21/22 1106   Vitals shown include unvalidated device data.      No case tracking events are documented in the log.      Pain/Diogenes Score: Pain Rating Prior to Med Admin: 7 (11/21/2022 10:35 AM)  Diogenes Score: 10 (11/21/2022  9:30 AM)

## 2022-11-21 NOTE — OR NURSING
Pt unable to urinate. Bladder scan shows 600 cc.Ady TEJADA at beside and informed. New orders given for In/Out cath.

## 2022-11-21 NOTE — ANESTHESIA PROCEDURE NOTES
Intubation    Date/Time: 11/21/2022 7:04 AM  Performed by: Petar Villarreal CRNA  Authorized by: Tiffanie Renee MD     Intubation:     Induction:  Intravenous    Intubated:  Postinduction    Mask Ventilation:  Easy with oral airway    Attempts:  1    Attempted By:  CRNA    Method of Intubation:  Direct    Blade:  Francisco Javier 3    Laryngeal View Grade: Grade I - full view of cords      Difficult Airway Encountered?: No      Complications:  None    Airway Device:  Oral endotracheal tube    Airway Device Size:  7.5    Style/Cuff Inflation:  Cuffed    Inflation Amount (mL):  8    Tube secured:  21    Secured at:  The lips    Placement Verified By:  Capnometry    Complicating Factors:  None    Findings Post-Intubation:  BS equal bilateral and atraumatic/condition of teeth unchanged

## 2022-11-21 NOTE — ANESTHESIA PREPROCEDURE EVALUATION
[facility]  Anesthesia Pre-Operative Evaluation         Patient Name: Jhonny Diana  YOB: 1990  MRN: [id number]    SUBJECTIVE:     Pre-operative evaluation for Procedure(s) (LRB):  REPAIR, HERNIA, INCISIONAL OR VENTRAL, WITHOUT HISTORY OF PRIOR REPAIR (N/A)     11/21/2022    Jhonny Diana is a 32 y.o. male w/ a significant PMHx of HTN, end stage liver disease s/p transplant [2017, on prograf] and CKD III.    Patient now presents for the above procedure(s).    LDA:       Peripheral IV - Single Lumen 11/21/22 0614 20 G Left;Posterior Forearm (Active)   Number of days: 0       Prev airway: None documented    Drips:   sodium chloride 0.9%         Patient Active Problem List   Diagnosis    Metabolic acidosis    S/P liver transplant    Prophylactic immunotherapy    Long-term use of immunosuppressant medication    At risk for opportunistic infections    Biliary stricture of transplanted liver    Hypothyroidism    Intractable nausea and vomiting    Constipation    Vitamin D deficiency    Alcohol use disorder, severe, in early remission    Anemia of chronic renal failure    Chronic kidney disease-mineral and bone disorder    Other pancytopenia    Thrombocytopenia    Anxiety about health    Opioid dependence    Moderate protein malnutrition    Acute kidney injury superimposed on CKD    Substance abuse    Lymphocytopenia    Umbilical hernia    Enteritis    Weakness    Right upper quadrant abdominal pain    Anemia of chronic disease    Enteritis of infectious origin    History of opioid abuse    LAURA (acute kidney injury)    Gastroenteritis    Diarrhea    Herpes zoster without complication       Review of patient's allergies indicates:   Allergen Reactions    Bactrim [sulfamethoxazole-trimethoprim] Other (See Comments)     Mookie Trell Syndrome       Current Inpatient Medications:   LIDOcaine (PF) 10 mg/ml (1%)           No current  facility-administered medications on file prior to encounter.     Current Outpatient Medications on File Prior to Encounter   Medication Sig Dispense Refill    losartan (COZAAR) 25 MG tablet TAKE 2 TABLETS(50 MG) BY MOUTH EVERY DAY 90 tablet 6    tiZANidine (ZANAFLEX) 4 MG tablet Take 4 mg by mouth every 6 (six) hours as needed.      DULoxetine (CYMBALTA) 20 MG capsule Take 20 mg by mouth every evening.      HYDROcodone-acetaminophen (NORCO) 7.5-325 mg per tablet Take 1 tablet by mouth every 24 hours as needed for Pain. Pt prescribed for pain management         Past Surgical History:   Procedure Laterality Date    APPENDECTOMY      CENTRAL VENOUS CATHETER TUNNELED INSERTION DOUBLE LUMEN      inserted and removed    ERCP      ERCP N/A 7/2/2018    Procedure: ERCP;  Surgeon: Solis Villanueva MD;  Location: [facility] ([address] [place] [address]);  Service: Endoscopy;  Laterality: N/A;  dialysis/cirrhosis/labs prior to ERCP/svn  platelet    ESOPHAGOGASTRODUODENOSCOPY      ESOPHAGOGASTRODUODENOSCOPY N/A 11/25/2019    Procedure: EGD (ESOPHAGOGASTRODUODENOSCOPY);  Surgeon: Sheri Siddiqui MD;  Location: [facility] ([address] [place] [address]);  Service: Endoscopy;  Laterality: N/A;    LIVER TRANSPLANT      10/2017       OBJECTIVE:     Vital Signs Range (Last 24H):  Temp:  [37.1 °C (98.8 °F)]   Pulse:  [85]   Resp:  [18]   BP: (143)/(85)   SpO2:  [100 %]       Significant Labs:  Lab Results   Component Value Date    WBC 5.68 12/11/2019    HGB 8.8 (L) 12/11/2019    HCT 29.2 (L) 12/11/2019     12/11/2019     12/11/2019    K 4.1 12/11/2019     12/11/2019    CREATININE 1.9 (H) 12/11/2019    BUN 12 12/11/2019    CO2 21 (L) 12/11/2019    INR 1.0 12/06/2019    HGBA1C <4.0 (A) 10/18/2017       Diagnostic Studies: No relevant studies.    EKG:   Results for orders placed or performed during the hospital encounter of 11/22/19   EKG 12-lead    Collection Time: 11/22/19  9:30 PM    Narrative    Test Reason : R53.1,    Vent. Rate : 079 BPM      Atrial Rate : 079 BPM     P-R Int : 182 ms          QRS Dur : 084 ms      QT Int : 352 ms       P-R-T Axes : 047 009 049 degrees     QTc Int : 403 ms    Age and gender specific analysis  Normal sinus rhythm  Normal ECG  When compared with ECG of 11-JUL-2018 11:18,  No significant change was found  Confirmed by Kurt Rock MD ([id number]) on 11/24/2019 1:33:45 PM    Referred By: [email or web address]   SELF           Confirmed By:Kurt Rock MD         ASSESSMENT/PLAN:       Pre-op Assessment    I have reviewed the Patient Summary Reports.     I have reviewed the Nursing Notes. I have reviewed the NPO Status.   I have reviewed the Medications.     Review of Systems  Anesthesia Hx:  No problems with previous Anesthesia  Denies Family Hx of Anesthesia complications.   Denies Personal Hx of Anesthesia complications.   Cardiovascular:   Hypertension    Pulmonary:  Pulmonary Normal    Renal/:   Chronic Renal Disease, CKD    Hepatic/GI:   Liver Disease,    Neurological:   Seizures    Endocrine:   Hypothyroidism        Physical Exam  General: Well nourished, Cooperative, Alert and Oriented    Airway:  Mallampati: II   Mouth Opening: Normal  Neck ROM: Normal ROM    Dental:  Intact    Heart:  Rate: Normal        Anesthesia Plan  Type of Anesthesia, risks & benefits discussed:    Anesthesia Type: Gen ETT  Intra-op Monitoring Plan: Standard ASA Monitors  Post Op Pain Control Plan: multimodal analgesia  Induction:  IV  Informed Consent: Informed consent signed with the Patient and all parties understand the risks and agree with anesthesia plan.  All questions answered.   ASA Score: 3  Day of Surgery Review of History & Physical: H&P Update referred to the surgeon/provider.    Ready For Surgery From Anesthesia Perspective.     .

## 2022-11-21 NOTE — BRIEF OP NOTE
[facility] - Surgery ([facility])  Brief Operative Note    Surgery Date: 11/21/2022     Surgeon(s) and Role:     * Rahul Villavicencio MD - Primary     * Saima White MD - Resident - Assisting        Pre-op Diagnosis:  Umbilical hernia without obstruction and without gangrene [K42.9]    Post-op Diagnosis:  Post-Op Diagnosis Codes:     * Umbilical hernia without obstruction and without gangrene [K42.9]    Procedure(s) (LRB):  REPAIR, HERNIA, INCISIONAL OR VENTRAL, WITHOUT HISTORY OF PRIOR REPAIR (N/A)    Anesthesia: General    Operative Findings: Open umbilical hernia repair with mesh. 4x4cm defect.     Estimated Blood Loss: minimal         Specimens:   Specimen (24h ago, onward)       Start     Ordered    11/21/22 0730  Specimen to Pathology, Surgery General Surgery  Once        Comments: Pre-op Diagnosis: Umbilical hernia without obstruction and without gangrene [K42.9]Procedure(s):REPAIR, HERNIA, INCISIONAL OR VENTRAL, WITHOUT HISTORY OF PRIOR REPAIR Number of specimens: 1Name of specimens: 1. Hernia Sac     References:    Click here for ordering Quick Tip   Question Answer Comment   Procedure Type: General Surgery    Specimen Class: Routine/Screening    Which provider would you like to cc? RAHUL VILLAVICENCIO    Which provider would you like to cc? SAIMA WHITE    Release to patient Immediate        11/21/22 0743                      Discharge Note    OUTCOME: Patient tolerated treatment/procedure well without complication and is now ready for discharge.    DISPOSITION: Home or Self Care    FINAL DIAGNOSIS:  Umbilical hernia    FOLLOWUP: In clinic    DISCHARGE INSTRUCTIONS:    Discharge Procedure Orders   Lifting restrictions   Order Comments: Do not push, pull, or lift anything heavier than 10lbs following surgery. This is to help avoid hernias at your incision stite.     Notify your health care provider if you experience any of the following:  temperature >100.4     Notify your health care provider if you  experience any of the following:  persistent nausea and vomiting or diarrhea     Notify your health care provider if you experience any of the following:  severe uncontrolled pain     Notify your health care provider if you experience any of the following:  redness, tenderness, or signs of infection (pain, swelling, redness, odor or green/yellow discharge around incision site)     Notify your health care provider if you experience any of the following:  difficulty breathing or increased cough     Notify your health care provider if you experience any of the following:  severe persistent headache     Notify your health care provider if you experience any of the following:  worsening rash     No dressing needed   Order Comments: You have skin glue over your incision. Do not remove, it will fall off on its own. You are OK to shower in 48 hours, do not soak or submerge in water - no swimming or baths. Shower daily.   Wear your abdominal binder when out of bed, OK to remove to shower     Activity as tolerated

## 2022-11-28 ENCOUNTER — PATIENT MESSAGE (OUTPATIENT)
Dept: TRANSPLANT | Facility: CLINIC | Age: 32
End: 2022-11-28
Payer: MEDICARE

## 2022-11-29 ENCOUNTER — PATIENT MESSAGE (OUTPATIENT)
Dept: TRANSPLANT | Facility: CLINIC | Age: 32
End: 2022-11-29
Payer: MEDICARE

## 2022-11-29 LAB
FINAL PATHOLOGIC DIAGNOSIS: NORMAL
Lab: NORMAL

## 2022-12-01 ENCOUNTER — PATIENT MESSAGE (OUTPATIENT)
Dept: SURGERY | Facility: CLINIC | Age: 32
End: 2022-12-01
Payer: MEDICARE

## 2022-12-02 ENCOUNTER — OFFICE VISIT (OUTPATIENT)
Dept: SURGERY | Facility: CLINIC | Age: 32
End: 2022-12-02
Payer: MEDICARE

## 2022-12-02 ENCOUNTER — PATIENT MESSAGE (OUTPATIENT)
Dept: NEPHROLOGY | Facility: CLINIC | Age: 32
End: 2022-12-02
Payer: MEDICARE

## 2022-12-02 ENCOUNTER — PATIENT MESSAGE (OUTPATIENT)
Dept: HEPATOLOGY | Facility: CLINIC | Age: 32
End: 2022-12-02
Payer: MEDICARE

## 2022-12-02 VITALS
HEART RATE: 67 BPM | TEMPERATURE: 98 F | OXYGEN SATURATION: 99 % | RESPIRATION RATE: 16 BRPM | DIASTOLIC BLOOD PRESSURE: 74 MMHG | SYSTOLIC BLOOD PRESSURE: 132 MMHG

## 2022-12-02 DIAGNOSIS — Z87.19 S/P REPAIR OF VENTRAL HERNIA: Primary | ICD-10-CM

## 2022-12-02 DIAGNOSIS — Z98.890 S/P REPAIR OF VENTRAL HERNIA: Primary | ICD-10-CM

## 2022-12-02 LAB
EXT ALBUMIN: 3.7
EXT ALKALINE PHOSPHATASE: 205
EXT ALT: 33
EXT AST: 33
EXT BILIRUBIN TOTAL: 0.6
EXT BUN: 32
EXT CALCIUM: 8.6
EXT CHLORIDE: 109
EXT CO2: 21
EXT CREATININE: 1.98 MG/DL
EXT EOSINOPHIL%: 5
EXT GFR MDRD NON AF AMER: 45
EXT GLUCOSE: 94
EXT HEMATOCRIT: 34.8
EXT HEMOGLOBIN: 12.3
EXT LYMPH%: 39.9
EXT MONOCYTES%: 9.6
EXT PLATELETS: 70
EXT POTASSIUM: 4.1
EXT PROTEIN TOTAL: 7
EXT SEGS%: 44.8
EXT SODIUM: 139 MMOL/L
EXT TACROLIMUS LVL: 6.3
EXT WBC: 3.5

## 2022-12-02 PROCEDURE — 1159F MED LIST DOCD IN RCRD: CPT | Mod: CPTII,S$GLB,, | Performed by: STUDENT IN AN ORGANIZED HEALTH CARE EDUCATION/TRAINING PROGRAM

## 2022-12-02 PROCEDURE — 3075F PR MOST RECENT SYSTOLIC BLOOD PRESS GE 130-139MM HG: ICD-10-PCS | Mod: CPTII,S$GLB,, | Performed by: STUDENT IN AN ORGANIZED HEALTH CARE EDUCATION/TRAINING PROGRAM

## 2022-12-02 PROCEDURE — 99024 PR POST-OP FOLLOW-UP VISIT: ICD-10-PCS | Mod: S$GLB,,, | Performed by: STUDENT IN AN ORGANIZED HEALTH CARE EDUCATION/TRAINING PROGRAM

## 2022-12-02 PROCEDURE — 4010F PR ACE/ARB THEARPY RXD/TAKEN: ICD-10-PCS | Mod: CPTII,S$GLB,, | Performed by: STUDENT IN AN ORGANIZED HEALTH CARE EDUCATION/TRAINING PROGRAM

## 2022-12-02 PROCEDURE — 3078F DIAST BP <80 MM HG: CPT | Mod: CPTII,S$GLB,, | Performed by: STUDENT IN AN ORGANIZED HEALTH CARE EDUCATION/TRAINING PROGRAM

## 2022-12-02 PROCEDURE — 3075F SYST BP GE 130 - 139MM HG: CPT | Mod: CPTII,S$GLB,, | Performed by: STUDENT IN AN ORGANIZED HEALTH CARE EDUCATION/TRAINING PROGRAM

## 2022-12-02 PROCEDURE — 99999 PR PBB SHADOW E&M-EST. PATIENT-LVL III: ICD-10-PCS | Mod: PBBFAC,,, | Performed by: STUDENT IN AN ORGANIZED HEALTH CARE EDUCATION/TRAINING PROGRAM

## 2022-12-02 PROCEDURE — 1159F PR MEDICATION LIST DOCUMENTED IN MEDICAL RECORD: ICD-10-PCS | Mod: CPTII,S$GLB,, | Performed by: STUDENT IN AN ORGANIZED HEALTH CARE EDUCATION/TRAINING PROGRAM

## 2022-12-02 PROCEDURE — 99024 POSTOP FOLLOW-UP VISIT: CPT | Mod: S$GLB,,, | Performed by: STUDENT IN AN ORGANIZED HEALTH CARE EDUCATION/TRAINING PROGRAM

## 2022-12-02 PROCEDURE — 99999 PR PBB SHADOW E&M-EST. PATIENT-LVL III: CPT | Mod: PBBFAC,,, | Performed by: STUDENT IN AN ORGANIZED HEALTH CARE EDUCATION/TRAINING PROGRAM

## 2022-12-02 PROCEDURE — 3078F PR MOST RECENT DIASTOLIC BLOOD PRESSURE < 80 MM HG: ICD-10-PCS | Mod: CPTII,S$GLB,, | Performed by: STUDENT IN AN ORGANIZED HEALTH CARE EDUCATION/TRAINING PROGRAM

## 2022-12-02 PROCEDURE — 4010F ACE/ARB THERAPY RXD/TAKEN: CPT | Mod: CPTII,S$GLB,, | Performed by: STUDENT IN AN ORGANIZED HEALTH CARE EDUCATION/TRAINING PROGRAM

## 2022-12-02 NOTE — PROGRESS NOTES
GENERAL SURGERY CLINIC  Post-Op Visit       HPI:  Jhonny Diana is a 32 y.o. male s/p incisional hernia repair w/ mesh for 4cm defect on 22 who presents to clinic for 2wk follow up. He is doing well. Pain under control. Ambulating. Having regular bowel movements. Denies fevers, chills, nausea, vomiting, CP, SOB. Incision has been healing well, no redness or tenderness. Tolerating a regular diet. No complaints.     Past Medical History:   Diagnosis Date    Abdominal pain 2018    Alcoholic cirrhosis 2018    Alcoholic hepatitis with ascites     Anemia     CKD (chronic kidney disease) stage 3, GFR 30-59 ml/min 2018    Encounter for blood transfusion     multiple blood transfusions    ESRD on dialysis     History of hematemesis 2017    Hypertension     Seizures     Substance abuse     alcohol and opioids    Thrombocytopenia     Transplanted liver     10/2017       Past Surgical History:   Procedure Laterality Date    APPENDECTOMY      CENTRAL VENOUS CATHETER TUNNELED INSERTION DOUBLE LUMEN      inserted and removed    ERCP      ERCP N/A 2018    Procedure: ERCP;  Surgeon: Solis Villanueva MD;  Location: [facility] ([address] [place] [address]);  Service: Endoscopy;  Laterality: N/A;  dialysis/cirrhosis/labs prior to ERCP/svn  platelet    ESOPHAGOGASTRODUODENOSCOPY      ESOPHAGOGASTRODUODENOSCOPY N/A 2019    Procedure: EGD (ESOPHAGOGASTRODUODENOSCOPY);  Surgeon: Sheri Siddiqui MD;  Location: [facility] ([address] [place] [address]);  Service: Endoscopy;  Laterality: N/A;    LIVER TRANSPLANT      10/2017       Current Outpatient Medications on File Prior to Visit   Medication Sig Dispense Refill    DULoxetine (CYMBALTA) 20 MG capsule Take 20 mg by mouth every evening.      HYDROcodone-acetaminophen (NORCO) 7.5-325 mg per tablet Take 1 tablet by mouth every 24 hours as needed for Pain. Pt prescribed for pain management      losartan (COZAAR) 25 MG tablet TAKE 2 TABLETS(50 MG) BY MOUTH EVERY DAY 90 tablet 6    []  methocarbamoL (ROBAXIN) 500 MG Tab Take 1 tablet (500 mg total) by mouth 4 (four) times daily. for 10 days 40 tablet 0    oxyCODONE (ROXICODONE) 5 MG immediate release tablet Take 1 tablet (5 mg total) by mouth every 6 (six) hours as needed for Pain (Take if still in pain after taking both tylenol and ibuprofen). 12 tablet 0    tacrolimus (PROGRAF) 1 MG Cap Take 1 capsule (1 mg total) by mouth once daily. 30 capsule 5    tiZANidine (ZANAFLEX) 4 MG tablet Take 4 mg by mouth every 6 (six) hours as needed.       No current facility-administered medications on file prior to visit.         PHYSICAL EXAM:  There were no vitals filed for this visit.    Physical Exam  Constitutional:       Appearance: Normal appearance.   HENT:      Head: Normocephalic and atraumatic.   Eyes:      Pupils: Pupils are equal, round, and reactive to light.   Cardiovascular:      Rate and Rhythm: Normal rate.   Pulmonary:      Effort: Pulmonary effort is normal. No respiratory distress.   Abdominal:      General: Abdomen is flat. There is no distension.      Palpations: Abdomen is soft.      Tenderness: There is no abdominal tenderness.      Comments: Incision cdi   Musculoskeletal:         General: Normal range of motion.      Cervical back: Normal range of motion.   Skin:     General: Skin is warm and dry.   Neurological:      General: No focal deficit present.      Mental Status: He is alert and oriented to person, place, and time.   Psychiatric:         Mood and Affect: Mood normal.         Behavior: Behavior normal.     PERTINENT LABS:  Reviewed.       PERTINENT IMAGING:  Reviewed.       ASSESSMENT/PLAN:  Jhonny Diana is a 32 y.o. male s/p incisional hernia repair w/ mesh on 11/21/22 who presents to clinic for 2wk follow up, doing well. Complaining of swelling of the right testicle, is going to see his primary care regarding this.  - RTC PRN      Saima White MD  [facility] PGY-III  Pager: [phone]

## 2022-12-12 ENCOUNTER — PATIENT MESSAGE (OUTPATIENT)
Dept: TRANSPLANT | Facility: CLINIC | Age: 32
End: 2022-12-12
Payer: MEDICARE

## 2022-12-12 DIAGNOSIS — Z94.4 LIVER TRANSPLANTED: ICD-10-CM

## 2022-12-12 RX ORDER — SIROLIMUS 1 MG/1
1 TABLET, FILM COATED ORAL DAILY
Qty: 30 TABLET | Refills: 11 | Status: SHIPPED | OUTPATIENT
Start: 2022-12-12 | End: 2024-01-03 | Stop reason: SDUPTHER

## 2022-12-12 RX ORDER — TACROLIMUS 1 MG/1
1 CAPSULE ORAL EVERY OTHER DAY
Qty: 15 CAPSULE | Refills: 5 | Status: SHIPPED | OUTPATIENT
Start: 2022-12-12 | End: 2023-03-22

## 2022-12-12 NOTE — TELEPHONE ENCOUNTER
Informed pt will transition back to Sirolimus. Instructed pt to take loading dose of Sirolimus 3 mg daily on day 1 and reduce Tacrolimus to 1 mg every other day. Day 2 will start Sirolimus 1 mg daily and continue Tacrolimus. Will repeat labs in 1 week.       ----- Message from Rosamaria Seay PharmD sent at 12/12/2022 10:16 AM CST -----  Yes, do every other day then.     Rosamaria   ----- Message -----  From: Carey Lutz RN  Sent: 12/12/2022  10:04 AM CST  To: Rosamaria Seay PharmD    Thanks, his Tac dose is 1 mg daily and he doesn't have 0.5 capsules. Would it be EOD?  ----- Message -----  From: Rosamaria Seay PharmD  Sent: 12/12/2022  10:02 AM CST  To: Carey Lutz RN    [facility],     Have patient start sirolimus 3 mg PO once on Day 1 and decrease FK dose to 50%.  Day 2 and onward: sirolimus 1 mg PO daily.  Check sirolimus and tacrolimus level on Day 7, if sirolimus level within 3-6 then discontinue FK.  If not, increase the dose of sirolimus and recheck in 5-7 days.     Rosamaria      ----- Message -----  From: Carey Lutz RN  Sent: 12/12/2022   9:12 AM CST  To: Abdominal Transplant Pharmacists    Could you assist? He was previously on Sirolimus 3 mg daily   ----- Message -----  From: Darnell Julio MD  Sent: 12/11/2022   6:41 AM CST  To: Carey Lutz RN    We can do now. Lets get Pharm on board  ----- Message -----  From: Carey Lutz RN  Sent: 12/8/2022   3:04 PM CST  To: Darnell Julio MD    Hernia repair was 11/21, had post op appt 12/2 and is healing well.   Pt asking if he can transition back to Sirolimus?  ----- Message -----  From: Darnell Julio MD  Sent: 12/8/2022   2:19 PM CST  To: [facility] Post-Liver Transplant Clinical    Liver transplant blood tests reviewed. Labs stable, no change in immunosuppression. Please continue to monitor liver tests per transplant protocol.

## 2022-12-27 ENCOUNTER — TELEPHONE (OUTPATIENT)
Dept: GASTROENTEROLOGY | Facility: CLINIC | Age: 32
End: 2022-12-27
Payer: MEDICARE

## 2023-01-01 NOTE — PROGRESS NOTES
"This Rn called Dr Landa per pt's mom request regarding her concern about pt's puffy appearance to face and dark circles around eyes. Pt's mom stated pt must have an allergy to something he was given throughout the day. VSS, pt denies any associated s/s. No apparent change in pt appearance from previous night/ morning noted by this Rn. Pt's concern is more about his pain management. Pt feels he needs " something iv" and stated if he is to get the Mri done he wants " iv pain med and sedation"  Md stated he would come see pt as soon as he can.   " Plan:  Obtain HUS weekly on Mondays -> will have HUS today  Follow up with head circumference every day

## 2023-01-03 ENCOUNTER — TELEPHONE (OUTPATIENT)
Dept: TRANSPLANT | Facility: CLINIC | Age: 33
End: 2023-01-03
Payer: MEDICARE

## 2023-01-03 ENCOUNTER — PATIENT MESSAGE (OUTPATIENT)
Dept: TRANSPLANT | Facility: CLINIC | Age: 33
End: 2023-01-03
Payer: MEDICARE

## 2023-01-03 LAB
EXT ALBUMIN: 4
EXT ALKALINE PHOSPHATASE: 191
EXT ALT: 21
EXT AST: 25
EXT BASOPHIL%: 1.7
EXT BILIRUBIN TOTAL: 0.6
EXT BUN: 28
EXT CALCIUM: 8.8
EXT CHLORIDE: 108
EXT CO2: 19
EXT CREATININE: 1.92 MG/DL
EXT EOSINOPHIL%: 5.7
EXT GLUCOSE: 92
EXT HEMATOCRIT: 37.3
EXT HEMOGLOBIN: 13.3
EXT LYMPH%: 40.8
EXT MONOCYTES%: 11.6
EXT PLATELETS: 76
EXT POTASSIUM: 4.5
EXT PROTEIN TOTAL: 7.7
EXT SEGS%: 40.2
EXT SIROLIMUS LVL: 4.9
EXT SODIUM: 139 MMOL/L
EXT WBC: 3.3

## 2023-01-03 NOTE — TELEPHONE ENCOUNTER
Labs reviewed via portal and are stable.  Patient will repeat labs on 1/23/23 per protocol.      ----- Message from Darnell Julio MD sent at 1/3/2023 10:13 AM CST -----  Liver transplant blood tests reviewed. Labs stable, no change in immunosuppression. Please continue to monitor liver tests per transplant protocol.

## 2023-01-19 ENCOUNTER — PATIENT MESSAGE (OUTPATIENT)
Dept: RESEARCH | Facility: HOSPITAL | Age: 33
End: 2023-01-19
Payer: MEDICARE

## 2023-01-24 LAB
EXT ALBUMIN: 3.8
EXT ALKALINE PHOSPHATASE: 206
EXT ALT: 21
EXT AST: 30
EXT BILIRUBIN TOTAL: 0.8
EXT BUN: 25
EXT CALCIUM: 9
EXT CHLORIDE: 104
EXT CO2: 22
EXT CREATININE: 1.95 MG/DL
EXT EOSINOPHIL%: 5.7
EXT GLUCOSE: 84
EXT HEMATOCRIT: 29.1
EXT HEMOGLOBIN: 10.5
EXT LYMPH%: 29.4
EXT MONOCYTES%: 10.9
EXT PLATELETS: 69
EXT POTASSIUM: 4.2
EXT PROTEIN TOTAL: 7.5
EXT SEGS%: 53.4
EXT SIROLIMUS LVL: 2.9
EXT SODIUM: 135 MMOL/L
EXT WBC: 3.4

## 2023-01-31 ENCOUNTER — TELEPHONE (OUTPATIENT)
Dept: TRANSPLANT | Facility: CLINIC | Age: 33
End: 2023-01-31
Payer: MEDICARE

## 2023-01-31 NOTE — TELEPHONE ENCOUNTER
Labs reviewed and are stable, continue q 3 months. Letter sent.       ----- Message from Darnell Julio MD sent at 1/31/2023 11:11 AM CST -----  Liver transplant blood tests reviewed. Labs stable, no change in immunosuppression. Please continue to monitor liver tests per transplant protocol.

## 2023-01-31 NOTE — LETTER
January 31, 2023    Jhonny Diana  [address] [place] [address]          Dear Jhonny Lebron:  MRN: [id number]    This is a follow up to your recent labs, your lab results were stable.  There are no medicine changes.  Please have your labs drawn again on 4/24/23.      If you cannot have your labs drawn on the scheduled date, it is your responsibility to call the transplant department to reschedule.  Please call [phone] and ask to speak to Lubna ROBERSON -  for all scheduling requests.     Sincerely,    Carey Lutz, RN,BSN,CCTC    Your Liver Transplant Coordinator    [facility]  [facility] [place] [address]  [phone]

## 2023-03-10 NOTE — ASSESSMENT & PLAN NOTE
- See long term use of immunosuppression.    [facility]  [facility] 1975  MRN: [id number]  CSN: [id number]    History and Physical    Subjective   Dima Zhao is a 47 y.o. year old  who presents for surgery due to cervical dysplasia.  HGSIL Pap with HPV type 16.  Lesion at 8:00 and 10:00 on colposcopy.    Past Medical History:   Diagnosis Date   • Anxiety    • Arthritis    • Bell's palsy    • Depression    • Family history of colonic polyps    • History of adenomatous polyp of colon    • History of colon polyps    • IBS (irritable bowel syndrome)    • Mitral valve prolapse    • Urinary tract infection    • Vaginal bacteriosis     history of      Past Surgical History:   Procedure Laterality Date   • COLONOSCOPY N/A 2017    One 10mm adenomatous polyp in the sigmoid colon; Two 3-4mm hyperplastic polyps in the rectum; Repeat 3 years   • COLONOSCOPY N/A 2020    Two 5-6mm adenomatous polyps in the transverse colon; One 5mm hyperplastic polyp in the sigmoid colon; One 4mm polyp in the sigmoid colon-complete resection-polyp tissue not retrieved; Petechia(e) in the distal rectum-biopsied; Non-bleeding internal hemorrhoids; Repeat 5 years   • COLPOSCOPY W/ BIOPSY / CURETTAGE     • ECTOPIC PREGNANCY SURGERY Right     Right Tube & Ovary Removed   • ENDOMETRIAL ABLATION     • ENDOSCOPY N/A 10/19/2020    Procedure: ESOPHAGOGASTRODUODENOSCOPY WITH ANESTHESIA;  Surgeon: Brittani Aguilar MD;  Location: [facility] ENDOSCOPY;  Service: Gastroenterology;  Laterality: N/A;  pre: GERD  post: gastric & duodenal polyps   Luis Alberto Serna MD   • MOUTH SURGERY     • NM OCTREOSCAN FOR CARCINOID TUMOR  2021     Social History    Tobacco Use      Smoking status: Every Day        Packs/day: 1.50        Types: Electronic Cigarette, Cigarettes      Smokeless tobacco: Never      Current Outpatient Medications:   •  BIOTIN PO, Take  by mouth., Disp: , Rfl:   •  Breztri Aerosphere 160-9-4.8 MCG/ACT aerosol inhaler, , Disp: , Rfl:   •  COLLAGEN PO,  "Take  by mouth., Disp: , Rfl:   •  Estradiol, Progesterone, Testosterone, Apply 1 application topically to the appropriate area as directed Daily. Estradiol 0.2mg ; Progesterone 40mg ; Tesosterone 0.1 mg per 0.25ml - Apply 1 click to labia qd, Disp: 30 g, Rfl: 2  •  Linzess 290 MCG capsule capsule, , Disp: , Rfl:   •  Multiple Vitamins-Minerals (MULTIVITAMIN ADULT PO), Take 1 tablet by mouth Daily., Disp: , Rfl:   •  omeprazole (priLOSEC) 40 MG capsule, , Disp: , Rfl:     No Known Allergies    Family History   Problem Relation Age of Onset   • Colon polyps Mother    • Heart disease Father    • COPD Father    • Hypertension Father    • Diabetes type II Father    • No Known Problems Sister    • Hypertension Brother    • Brain cancer Paternal Grandfather    • Breast cancer Neg Hx    • Colon cancer Neg Hx    • Ovarian cancer Neg Hx    • Esophageal cancer Neg Hx    • Liver cancer Neg Hx    • Liver disease Neg Hx    • Rectal cancer Neg Hx    • Stomach cancer Neg Hx      Review of Systems   Constitutional: Negative for unexpected weight change.   HENT: Negative for trouble swallowing.    Respiratory: Negative for shortness of breath.    Cardiovascular: Negative for chest pain.   Musculoskeletal: Negative for neck stiffness.   Neurological: Negative for seizures.   Hematological: Does not bruise/bleed easily.         Objective   /78 (BP Location: Left arm, Patient Position: Sitting)   Ht 175.3 cm (69\")   Wt 120 kg (265 lb)   BMI 39.13 kg/m²     Physical Exam   Physical Exam  Vitals reviewed.   Constitutional:       Appearance: She is well-developed.   HENT:      Head: Normocephalic and atraumatic.   Eyes:      General: No scleral icterus.  Neck:      Trachea: No tracheal deviation.   Cardiovascular:      Rate and Rhythm: Normal rate and regular rhythm.   Pulmonary:      Effort: Pulmonary effort is normal.      Breath sounds: Normal breath sounds.   Abdominal:      General: Bowel sounds are normal. There is no " distension.      Palpations: Abdomen is soft.      Tenderness: There is no abdominal tenderness.   Genitourinary:     Exam position: Supine.      Labia:         Right: No lesion.         Left: No lesion.       Vagina: No vaginal discharge or bleeding.      Cervix: No cervical motion tenderness.      Uterus: Not enlarged, not fixed and not tender.       Adnexa:         Right: No mass.          Left: No mass.        Rectum: No external hemorrhoid.   Skin:     General: Skin is warm and dry.   Neurological:      Mental Status: She is alert and oriented to person, place, and time.         Labs  Lab Results   Component Value Date     07/16/2022    HGB 14.2 07/16/2022    HCT 43.8 07/16/2022    WBC 8.04 07/16/2022     07/16/2022    K 4.5 07/16/2022     07/16/2022    CO2 28.0 07/16/2022    BUN 14 07/16/2022    CREATININE 0.96 07/16/2022    GLUCOSE 113 (H) 07/16/2022    ALBUMIN 4.10 07/16/2022    CALCIUM 9.2 07/16/2022    AST 13 07/16/2022    ALT 11 07/16/2022    BILITOT 0.3 07/16/2022        Assessment & Plan    Diagnoses and all orders for this visit:    1. HGSIL (high grade squamous intraepithelial lesion) on Pap smear of cervix (Primary)  -     Cancel: Case Request  -     Case Request  -     CBC (No Diff); Future  -     Basic Metabolic Panel; Future  -     sodium chloride 0.9 % infusion  -     sodium chloride 0.9 % flush 10 mL  -     sodium chloride 0.9 % flush 1-10 mL  -     sodium chloride 0.9 % infusion 40 mL    Other orders  -     Follow Anesthesia Guidelines / Protocol; Future  -     Obtain Informed Consent; Future  -     Provide NPO Instructions to Patient; Future  -     Follow Anesthesia Guidelines / Protocol; Standing  -     Insert Peripheral IV; Standing  -     Saline Lock & Maintain IV Access; Standing      Risks, benefits and indications for LEEP conization were discussed with the patient at length.  Risks of bleeding and infection were reviewed.  Post operative restrictions were discussed  and encouraged.  The importance of post operative pap smear surveillance as directed was explained.      All of the patient's questions were answered to her satisfaction and she agreed to proceeding as indicated.    Aguilar Bourgeois MD  3/10/2023  11:27 CST

## 2023-03-31 ENCOUNTER — PATIENT MESSAGE (OUTPATIENT)
Dept: HEPATOLOGY | Facility: CLINIC | Age: 33
End: 2023-03-31
Payer: MEDICARE

## 2023-04-04 ENCOUNTER — TELEPHONE (OUTPATIENT)
Dept: GASTROENTEROLOGY | Facility: CLINIC | Age: 33
End: 2023-04-04

## 2023-04-04 LAB
EXT ALBUMIN: 4
EXT ALKALINE PHOSPHATASE: 147
EXT ALT: 23
EXT ANC: 1200
EXT AST: 27
EXT BASOPHIL%: 1
EXT BILIRUBIN TOTAL: 0.6
EXT BUN: 30
EXT CALCIUM: 8.6
EXT CHLORIDE: 103
EXT CO2: 20
EXT CREATININE: 1.72 MG/DL
EXT EGFR NO RACE VARIABLE: 53
EXT EOSINOPHIL%: 8.1
EXT GLUCOSE: 96
EXT HEMATOCRIT: 32.9
EXT HEMOGLOBIN: 11.2
EXT LYMPH%: 40.2
EXT MONOCYTES%: 10.4
EXT PLATELETS: 76
EXT POTASSIUM: 4.3
EXT PROTEIN TOTAL: 7.3
EXT SEGS%: 40.3
EXT SIROLIMUS LVL: 2.3
EXT SODIUM: 135 MMOL/L
EXT WBC: 3

## 2023-04-10 ENCOUNTER — TELEPHONE (OUTPATIENT)
Dept: TRANSPLANT | Facility: CLINIC | Age: 33
End: 2023-04-10

## 2023-04-10 ENCOUNTER — PATIENT MESSAGE (OUTPATIENT)
Dept: TRANSPLANT | Facility: CLINIC | Age: 33
End: 2023-04-10

## 2023-04-10 NOTE — TELEPHONE ENCOUNTER
Labs reviewed via portal and are stable.  Patient will repeat labs on 9/19/23 per protocol.      ----- Message from Darnell Julio MD sent at 4/10/2023 10:37 AM CDT -----  Liver transplant blood tests reviewed. Labs stable, no change in immunosuppression. Please continue to monitor liver tests per transplant protocol.

## 2023-05-27 PROBLEM — Z94.4 STATUS POST LIVER TRANSPLANT: Status: ACTIVE | Noted: 2017-10-20

## 2023-05-27 PROBLEM — N18.32 STAGE 3B CHRONIC KIDNEY DISEASE: Status: ACTIVE | Noted: 2023-05-27

## 2023-05-27 PROBLEM — I15.1 HYPERTENSION SECONDARY TO OTHER RENAL DISORDERS: Status: ACTIVE | Noted: 2023-05-27

## 2023-05-27 PROBLEM — D64.9 ANEMIA: Status: ACTIVE | Noted: 2023-05-27

## 2023-05-27 PROBLEM — Z51.81 THERAPEUTIC DRUG MONITORING: Status: ACTIVE | Noted: 2023-05-27

## 2023-05-27 NOTE — PROGRESS NOTES
Jhonny Diana   is a 32 y.o.. I performed this consultation using real-time Telehealth tools, including a live video connection between my location and the patient's location. Prior to initiating the consultation, I obtained informed verbal consent to perform this consultation using Telehealth tools and answered all the questions about the Telehealth interaction.    Each patient to whom he or she provides medical services by telemedicine is:  (1) informed of the relationship between the physician and patient and the respective role of any other health care provider with respect to management of the patient; and (2) notified that he or she may decline to receive medical services by telemedicine and may withdraw from such care at any time.      Transplant Hepatology  Liver Transplant Recipient Follow Up    PATIENT: Jhonny Diana  MRN: [id number]  DATE: 2023    Provider: Hepatologist - Dr Julio  Referring provider: No ref. provider found    Transplant History  Transplant Date: 10/19/2017  UNOS Native Liver Dx: Acute Alcoholic Hepatitis     Mr. Diana is here for follow up of his liver transplant.  He is accompanied by his mother.       ORGAN: LIVER  Whole or Partial: whole liver  Donor Type:  - brain death  CDC High Risk: no  Donor CMV Status: Positive  Donor HCV Status: Negative  Donor HBcAb: Negative  Biliary Anastomosis: end to end  Arterial Anatomy: standard  IVC reconstruction: end to end ivc  Portal vein status: patent       Post-transplant period notable for LAURA previously on HD, anastomotic stricture, ETOH relapse (2019),    Chief complaint: follow up, history of OLT    Subjective:   Jhonny Diana is a 32 y.o. 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 Patient is new to me and previously followed by Dr Rogers ( last visit 2019). Status post OLT 5 years      2023  He has not had recent hospitalizations or ED  visits. He reports compliance with Immunosuppression.   He denies recent fever, chills, nausea, vomiting, abdominal pain, diarrhea, headache, tremors.  incisional hernia repair w/ mesh in 2022. No new complains      7/13/21  He is doing well except intermittent episodes of viral illness. Concerned regarding rising creatinine and would like to follow Nephrology  C/o abdominal hernia-not obstructed but waiting for surgery        Allograft function:  Allograft function :   ALT   Date Value Ref Range Status   12/11/2019 7 (L) 10 - 44 U/L Final     AST   Date Value Ref Range Status   12/11/2019 22 10 - 40 U/L Final     Alkaline Phosphatase   Date Value Ref Range Status   12/11/2019 139 (H) 55 - 135 U/L Final     Tacrolimus Lvl   Date Value Ref Range Status   03/04/2018 <1.5 (L) 5.0 - 15.0 ng/mL Final     Comment:     Testing performed by Liquid Chromatography-Tandem  Mass Spectrometry (LC-MS/MS).  This test was developed and its performance characteristics  determined by [facility], Department of Pathology  and Laboratory Medicine in a manner consistent with CLIA  requirements. It has not been cleared or approved by the [place]  Food and Drug Administration.  This test is used for clinical   purposes.  It should not be regarded as investigational or for  research.          Immunosuppression:   Tacrolimus:                                          no  sirolimus                                         yes, 1 mg,   MMF:                                                   no,   Prednisone:                                        No  Cyclosporine:                                       no  Sirolimus:                                             no    Post-LT Complications  Acute cellular rejection:                       no  Antibody-mediated rejection:               no  Biliary anastomotic stricture:               yes  HAT:                                                     no  HA stenosis:                                          no  PV stenosis:                                         no  CMV reactivation:                                 no  PTLD:                                                   no  Other malignancies:                             no      Health Maintenance:  Dermatology check up:                           no  Colonoscopy:                                          yes  Bone Mineral Density (BMD):                  yes  HbA1C:                                                    Yes      Review of systems:  A review of 12+ systems was conducted with pertinent positive and negative findings documented in HPI with all other systems reviewed and negative.    Past Medical History:   Past Medical History:   Diagnosis Date    Abdominal pain 5/27/2018    Alcoholic cirrhosis 8/14/2018    Alcoholic hepatitis with ascites     Anemia     CKD (chronic kidney disease) stage 3, GFR 30-59 ml/min 8/14/2018    Encounter for blood transfusion     multiple blood transfusions    ESRD on dialysis     History of hematemesis 9/28/2017    Hypertension     Seizures     Substance abuse     alcohol and opioids    Thrombocytopenia     Transplanted liver     10/2017       Past Surgical HIstory:   Past Surgical History:   Procedure Laterality Date    APPENDECTOMY      CENTRAL VENOUS CATHETER TUNNELED INSERTION DOUBLE LUMEN      inserted and removed    ERCP      ERCP N/A 7/2/2018    Procedure: ERCP;  Surgeon: Solis Villanueva MD;  Location: [facility] ([address] [place] [address]);  Service: Endoscopy;  Laterality: N/A;  dialysis/cirrhosis/labs prior to ERCP/svn  platelet    ESOPHAGOGASTRODUODENOSCOPY      ESOPHAGOGASTRODUODENOSCOPY N/A 11/25/2019    Procedure: EGD (ESOPHAGOGASTRODUODENOSCOPY);  Surgeon: Sheri Siddiqui MD;  Location: [facility] ([address] [place] [address]);  Service: Endoscopy;  Laterality: N/A;    LIVER TRANSPLANT      10/2017       Family History:   Family History   Problem Relation Age of Onset    No Known Problems Mother     Cancer Father         Lung cancer    Alcohol  abuse Father     No Known Problems Sister     No Known Problems Brother     Kidney disease Neg Hx        Social History:  reports that he quit smoking about 6 years ago. His smoking use included cigarettes. He has a 10.00 pack-year smoking history. He has never used smokeless tobacco. He reports that he does not drink alcohol and does not use drugs.    PFSH:  Past medical, family, and social history reviewed as documented in chart with pertinent positive medical, family, and social history detailed in HPI.    Allergies:  Review of patient's allergies indicates:   Allergen Reactions    Bactrim [sulfamethoxazole-trimethoprim] Other (See Comments)     Mookie Trell Syndrome       Medications:  Current Outpatient Medications   Medication Sig Dispense Refill    DULoxetine (CYMBALTA) 20 MG capsule Take 20 mg by mouth every evening.      HYDROcodone-acetaminophen (NORCO) 7.5-325 mg per tablet Take 1 tablet by mouth every 24 hours as needed for Pain. Pt prescribed for pain management      losartan (COZAAR) 25 MG tablet TAKE 2 TABLETS(50 MG) BY MOUTH EVERY DAY 90 tablet 6    oxyCODONE (ROXICODONE) 5 MG immediate release tablet Take 1 tablet (5 mg total) by mouth every 6 (six) hours as needed for Pain (Take if still in pain after taking both tylenol and ibuprofen). 12 tablet 0    sirolimus (RAPAMUNE) 1 MG Tab Take 1 tablet (1 mg total) by mouth once daily. 30 tablet 11    tiZANidine (ZANAFLEX) 4 MG tablet Take 4 mg by mouth every 6 (six) hours as needed.       No current facility-administered medications for this visit.       Review of Systems   Constitutional:  Negative for fatigue, fever and unexpected weight change.   HENT:  Negative for ear pain, nosebleeds and trouble swallowing.    Eyes:  Negative for discharge and redness.   Respiratory:  Negative for cough and shortness of breath.    Cardiovascular:  Negative for palpitations and leg swelling.   Gastrointestinal:  Negative for abdominal distention, abdominal pain,  diarrhea and vomiting.   Endocrine: Negative for cold intolerance and polyuria.   Genitourinary:  Negative for flank pain and hematuria.   Musculoskeletal:  Negative for back pain.   Skin:  Negative for pallor.   Neurological:  Negative for seizures and headaches.   Hematological:  Does not bruise/bleed easily.   Psychiatric/Behavioral:  Negative for confusion and hallucinations.        UNOS Patient Status  Functional Status: 80% - Normal activity with effort: some symptoms of disease  Physical Capacity: No Limitations      Objective:      Vitals: There were no vitals filed for this visit.    Physical Exam  Constitutional:       General: He is not in acute distress.     Appearance: Normal appearance.   HENT:      Head: Normocephalic and atraumatic.      Nose: Nose normal.      Mouth/Throat:      Mouth: Mucous membranes are moist.   Eyes:      Conjunctiva/sclera: Conjunctivae normal.   Cardiovascular:      Rate and Rhythm: Normal rate.   Pulmonary:      Effort: Pulmonary effort is normal. No respiratory distress.   Abdominal:      General: There is no distension.      Palpations: There is no mass.      Tenderness: There is no abdominal tenderness.   Musculoskeletal:         General: Normal range of motion.      Cervical back: Normal range of motion.      Right lower leg: No edema.      Left lower leg: No edema.   Skin:     Coloration: Skin is not jaundiced.      Findings: No rash.   Neurological:      General: No focal deficit present.      Mental Status: He is alert.   Psychiatric:         Mood and Affect: Mood normal.       Laboratory Data:  No visits with results within 1 Week(s) from this visit.   Latest known visit with results is:   Abstract on 04/04/2023   Component Date Value Ref Range Status    EXT WBC 04/04/2023 3.0 (L)   Final    EXT Hematocrit 04/04/2023 32.9 (L)   Final    EXT Hemoglobin 04/04/2023 11.2 (L)   Final    EXT Platelets 04/04/2023 76 (L)   Final    EXT SEGS% 04/04/2023 40.3   Final    EXT  Basophil % 04/04/2023 1.0   Final    EXT Lymph% 04/04/2023 40.2   Final    EXT MONOCYTES% 04/04/2023 10.4   Final    EXT Eosinophil% 04/04/2023 8.1   Final    EXT Glucose 04/04/2023 96   Final    EXT BUN 04/04/2023 30   Final    EXT Creatinine 04/04/2023 1.72  mg/dL Final    EXT Calcium 04/04/2023 8.6   Final    EXT Sodium 04/04/2023 135  mmol/L Final    EXT Potassium 04/04/2023 4.3   Final    EXT CO2 04/04/2023 20 (L)   Final    EXT Chloride 04/04/2023 103   Final    EXT Protein total 04/04/2023 7.3   Final    EXT Albumin 04/04/2023 4.0   Final    EXT BilirubiN Total 04/04/2023 0.6   Final    EXT AST 04/04/2023 27   Final    EXT ALT 04/04/2023 23   Final    EXT Alkaline Phosphatase 04/04/2023 147   Final    EXT eGFR NO RACE VARIABLE 04/04/2023 53   Final    EXT Sirolimus Lvl 04/04/2023 2.3   Final    EXT ANC 04/04/2023 1,200   Final       Lab Results   Component Value Date    WBC 5.68 12/11/2019    HGB 8.8 (L) 12/11/2019    HCT 29.2 (L) 12/11/2019    MCV 89 12/11/2019     12/11/2019       Lab Results   Component Value Date     12/11/2019    K 4.1 12/11/2019     12/11/2019    CO2 21 (L) 12/11/2019    BUN 12 12/11/2019    CREATININE 1.9 (H) 12/11/2019    CALCIUM 8.4 (L) 12/11/2019    ANIONGAP 10 12/11/2019    ESTGFRAFRICA 53.5 (A) 12/11/2019    EGFRNONAA 46.3 (A) 12/11/2019       Lab Results   Component Value Date    ALT 7 (L) 12/11/2019    AST 22 12/11/2019     (H) 10/26/2017    ALKPHOS 139 (H) 12/11/2019    BILITOT 0.3 12/11/2019       Lab Results   Component Value Date    TACROLIMUS <1.5 (L) 03/04/2018       Imaging:  I personally reviewed imaging studies and outside records..    Imaging study:     Pathology:    Assessment:       1. Immunosuppression    2. Status post liver transplant    3. Therapeutic drug monitoring    4. Stage 3b chronic kidney disease    5. Anemia, unspecified type    6. Hypertension secondary to other renal disorders             Plan:     Recommendations:  Allograft  Function  - Excellent graft function with normal LFTs in 4/2023    Immunosuppression   - Continue the current Immunosuppression.   --Additional testing to be completed according to Written Order Guidelines for Post-Liver and Combined Liver/Kidney Transplant Follow-up (LI-09)    CKD IIIB  Improving  Following nephrology     HTN  stays 130s at home   on anti HTN    Anemia  -from ckd  -Hgb goal ~ 10      Health Maintenance/Screening:  - Recommend age appropriate cancer screening  - Skin cancer: Recommend use of sunscreen SPF 30 or higher, hat and sunglasses while outside, dermatologist visit annually or sooner if any concerning lesions.  - Osteoporosis: Recommend bone density testing every 2-3 years if previously normal or annually if previously abnormal.  -Dental: Cleaning andfollow up every 6 month      Return to clinic in 12 months.    I have sent communication to the referring physician and/or primary care provider.    Face to Face time with patient: 20 min  15 minutes of total time spent on the encounter, which includes face to face time and non-face to face time preparing to see the patient (eg, review of tests), Obtaining and/or reviewing separately obtained history, Documenting clinical information in the electronic or other health record, Independently interpreting results (not separately reported) and communicating results to the patient/family/caregiver, or Care coordination (not separately reported).             Darnell Julio MD  Transplant Hepatology & Gastroenterology  Hepatology and Liver Transplant  [facility]  [facility]

## 2023-05-30 ENCOUNTER — OFFICE VISIT (OUTPATIENT)
Dept: HEPATOLOGY | Facility: CLINIC | Age: 33
End: 2023-05-30
Payer: MEDICARE

## 2023-05-30 ENCOUNTER — PATIENT MESSAGE (OUTPATIENT)
Dept: HEPATOLOGY | Facility: CLINIC | Age: 33
End: 2023-05-30

## 2023-05-30 DIAGNOSIS — N18.32 STAGE 3B CHRONIC KIDNEY DISEASE: ICD-10-CM

## 2023-05-30 DIAGNOSIS — Z51.81 THERAPEUTIC DRUG MONITORING: ICD-10-CM

## 2023-05-30 DIAGNOSIS — D64.9 ANEMIA, UNSPECIFIED TYPE: ICD-10-CM

## 2023-05-30 DIAGNOSIS — I15.1 HYPERTENSION SECONDARY TO OTHER RENAL DISORDERS: ICD-10-CM

## 2023-05-30 DIAGNOSIS — Z94.4 STATUS POST LIVER TRANSPLANT: ICD-10-CM

## 2023-05-30 DIAGNOSIS — D84.9 IMMUNOSUPPRESSION: Primary | ICD-10-CM

## 2023-05-30 PROCEDURE — 4010F ACE/ARB THERAPY RXD/TAKEN: CPT | Mod: CPTII,95,, | Performed by: INTERNAL MEDICINE

## 2023-05-30 PROCEDURE — 99214 OFFICE O/P EST MOD 30 MIN: CPT | Mod: 95,,, | Performed by: INTERNAL MEDICINE

## 2023-05-30 PROCEDURE — 4010F PR ACE/ARB THEARPY RXD/TAKEN: ICD-10-PCS | Mod: CPTII,95,, | Performed by: INTERNAL MEDICINE

## 2023-05-30 PROCEDURE — 1159F MED LIST DOCD IN RCRD: CPT | Mod: CPTII,95,, | Performed by: INTERNAL MEDICINE

## 2023-05-30 PROCEDURE — 1160F PR REVIEW ALL MEDS BY PRESCRIBER/CLIN PHARMACIST DOCUMENTED: ICD-10-PCS | Mod: CPTII,95,, | Performed by: INTERNAL MEDICINE

## 2023-05-30 PROCEDURE — 1159F PR MEDICATION LIST DOCUMENTED IN MEDICAL RECORD: ICD-10-PCS | Mod: CPTII,95,, | Performed by: INTERNAL MEDICINE

## 2023-05-30 PROCEDURE — 99214 PR OFFICE/OUTPT VISIT, EST, LEVL IV, 30-39 MIN: ICD-10-PCS | Mod: 95,,, | Performed by: INTERNAL MEDICINE

## 2023-05-30 PROCEDURE — 1160F RVW MEDS BY RX/DR IN RCRD: CPT | Mod: CPTII,95,, | Performed by: INTERNAL MEDICINE

## 2023-06-01 NOTE — PROGRESS NOTES
[facility] NOTE     Patient currently on hemodialysis for removal of uremic toxins .     Patient seen and evaluated on hemodialysis, tolerating treatment, see HD flowsheet for vitals and assessments.      No Hypotension, chest pain, shortness of breath, cramping, nausea or vomiting.       Billing Type: Third-Party Bill Bill For Surgical Tray: no Expected Date Of Service: 06/01/2023 Performing Laboratory: 0

## 2023-07-10 ENCOUNTER — PATIENT MESSAGE (OUTPATIENT)
Dept: TRANSPLANT | Facility: CLINIC | Age: 33
End: 2023-07-10
Payer: MEDICARE

## 2023-08-14 ENCOUNTER — PATIENT MESSAGE (OUTPATIENT)
Dept: TRANSPLANT | Facility: CLINIC | Age: 33
End: 2023-08-14
Payer: MEDICARE

## 2023-08-24 NOTE — ASSESSMENT & PLAN NOTE
- Post op course complicated by fevers and hyperbilirubinemia.  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  - AST/ALT normal. Tbili/alkphos remain elevated.  - Liver US 1/8 showed 3.9 cm complex fluid collection anterior to right lobe and moderate nonspecific complex ascites inferior to transplant.   - IR placed drain 1/11/18, cell count negative for infection.  - PBS consulted. PBS not comfortable proceeding with EUS with liver biopsy given fever and possible infectious process going on. ERCP 1/17 with stone and sludge. Pt placed on Actigall - treated with 10 day course of abx.  ID now signed off.  - Bili elevated - liver u/s 1/28  - Liver biopsy 1/23 without rejection.  - consulted PBS for ERCP - Choledocholithiasis was found along with sludge, stents placed 1/30.  - ERCP 2/19, overall unremarkable findings. Did place new larger stents. Will need repeat ERCP in 10 weeks. Bilirubin is improving though post ERCP.   Yes

## 2023-09-18 NOTE — TELEPHONE ENCOUNTER
Returned call to pt's mother. She had questions about kidney transplant and living donation and whether she would need a caregiver for pt if she donated organ. Will ask kidney referral team.   Subjective     Patient ID: Cinthya Cuevas is a 87 y.o. female.    Chief Complaint: Intermittent chest pressure (Comes and goes- feels like a elephant sitting on chest x several weeks)    Urgent care visit    Chest pain:  States starts out as sharp pain in center of chest then it increases to feeling like someone is sitting on her chest.  And can occur with activity or at rest.  Resolves on its own. Not taking anything for pain.  No prior h/o cardiac abnormalities except a cardiac murmur.  Patient states she was told that that was fine.  Patient also has history of peripheral vascular disease, but does not follow with vascular.  No chest pain today.       Review of Systems   Constitutional:  Negative for activity change, appetite change, chills, diaphoresis, fatigue, fever and unexpected weight change.   Respiratory:  Negative for cough, choking and chest tightness.    Cardiovascular:  Positive for chest pain. Negative for palpitations, leg swelling and claudication.   Gastrointestinal:  Negative for abdominal pain, change in bowel habit, constipation, diarrhea, nausea, vomiting and change in bowel habit.   Psychiatric/Behavioral:  Negative for dysphoric mood, self-injury, sleep disturbance and suicidal ideas. The patient is not nervous/anxious.           Objective     Physical Exam  Vitals reviewed.   Constitutional:       General: She is not in acute distress.     Appearance: Normal appearance. She is normal weight. She is not ill-appearing or toxic-appearing.   Cardiovascular:      Rate and Rhythm: Normal rate and regular rhythm.      Heart sounds: Murmur heard.   Pulmonary:      Effort: Pulmonary effort is normal. No respiratory distress.      Breath sounds: Normal breath sounds.   Musculoskeletal:      Right lower leg: Edema (1+) present.      Left lower leg: Edema (2+) present.      Comments: Using wheeled walker   Neurological:      General: No focal deficit present.      Mental Status: She is alert and  oriented to person, place, and time.   Psychiatric:         Mood and Affect: Mood normal.         Behavior: Behavior normal.            Assessment and Plan     1. Chest pain, unspecified type  -     Ambulatory referral/consult to Cardiology; Future; Expected date: 09/25/2023  -     EKG 12-lead    2. PVD (peripheral vascular disease)    3. Cardiac murmur, unspecified    4. Elevated blood pressure reading    5. Former smoker    6. Type II diabetes mellitus with neurological manifestations  -     CBC Auto Differential  -     Comprehensive Metabolic Panel  -     Hemoglobin A1C  -     Microalbumin/Creatinine Ratio, Urine    7. Anemia, unspecified type  -     CBC Auto Differential  -     Iron and TIBC  -     Ferritin      We will draw labs ordered by PCP  Abnormal EKG  We will place urgent referral to Cardiology for further evaluation   Advised patient if chest pain returns to go to ER for Further evaluation  Risks, benefits, and side effects were discussed with the patient. All questions were answered to the fullest satisfaction of the patient, and pt verbalized understanding and agreement to treatment plan. Pt was to call with any new or worsening symptoms, or present to the ER.         Libertad Reece MD  Family Medicine Physician   [facility]     This note was created using M*Modal voice recognition software that occasionally may misinterpret phrases or words.

## 2023-09-19 NOTE — ASSESSMENT & PLAN NOTE
- 3/1, patient with stuttering speech, trouble recalling specific words when speaking. Does have hx of seizure (on keppra) previously while on prograf. Switched to prograf over the weekend from cyclosporine. Neuro exam unremarkable.   - CT head without contrast obtained, unremarkable.   - In light of these symptoms, prograf d/c, restart cyclosporine 3/1.  - remains w/o seizure. Cont to have stutter/garbled speech/difficulty finding words.  Worse post EGD as sedation was given.  - MRI 3/6 unremarkable.  - EEG completed 3/8 - mild, generalized, non-specific cerebral dysfunction, no epileptiform activity.  - monitor closely. VSS.   No Repair - Repaired With Adjacent Surgical Defect Text (Leave Blank If You Do Not Want): After obtaining clear surgical margins the defect was repaired concurrently with another surgical defect which was in close approximation.

## 2023-09-28 NOTE — PLAN OF CARE
"Email Note     Sent the following email to client's parents;     \"From Dr. Han:     Cecilia,    I met with Madhav today and she had some nausea and reflux last night after taking lithium, but she did not experience vomiting.  She is feeling good this morning.  I reminded her she should take ondansetron if she is having any nausea/stomach upset or the urge to vomit, as this medication helps settle stomach upset and prevent vomiting.  If this doesn't work after trying once or twice, you could also try hydroxyzine, as this is also a helpful agent for settling the stomach.  My hope is that with additional time, she will acclimate to lithium, which is one of the best medications we have to treat bipolar depression.  She should continue this dosing through the weekend, but she will alert me tomorrow if she has worsening symptoms.  I will otherwise touch base with her again on Monday.  Let me know if you have any questions.    -Dr. Emely Saxena, MA, [facility], [facility]   Psychotherapist  [facility], [facility]  [facility] [place] [address]    Phone: [phone]  Fax: [phone]  Email: [email or web address]  Pronouns: She/Her\"  " Problem: Patient Care Overview  Goal: Plan of Care Review  Patient is AAOx4. Patient educated to use call bell for assistance, verbalized understanding. Patient remains free from falls or injury so far this shift, ambulates with 1 person assistance and walker. Patient worked with PT today.   Afebrile, WBC 6.37. Continuing IV flagyl, cefepime and isavuconazonium.   LIJ with TPN at 48 cc/hr. Started calorie count this shift, patient and patient's mom aware of how to record intake. Patient continues with minimal appetite - ate 25% of breakfast and 25% of lunch.  Chevron incision with hydrocolloid dressing to R side dressing change due 3/20/18.   Patient anuric - last HD 3/14 with 4 L off. Patient on MWF schedule.   Patient had no bowel movement so far this shift.   Patient complained of abdominal pain twice so far this shift, moderate relief noted with PRN oxycodone.   Patient complained of nausea once so far this shift, PRN zofran admin per order with full relief noted.  No skin breakdown noted. Patient able to reposition self independently.    See flow sheet for complete assessment details.

## 2023-10-08 NOTE — CONSULTS
"[facility]  Neurology  Consult Note    Patient Name: Jhonny Diana  MRN: [id number]  Admission Date: 9/27/2017  Hospital Length of Stay: 36 days  Code Status: Full Code   Attending Provider: Solis Chapa MD   Consulting Provider: Marcus Duvall MD  Primary Care Physician: Provider Notinsystem  Principal Problem:Liver replaced by transplant    Inpatient consult to Neurology  Consult performed by: MARCUS DUVALL  Consult ordered by: ADIA MCLEOD         Subjective:     Chief Complaint:  Seizure     HPI:   Mr. Diana is a 27 year old man with a history of significant alcohol abuse, s/p OLT on 10/19/17 and acute renal failure requiring dialysis. Neurology is consulted for seizure.     His mother reports that this morning he was sitting on the couch with OT, when he suddenly looked at her "like a deer in headlights" and started repeating the same syllable (ba, ba, ba). He then started shaking all over, and his arms were rigid at his sides. This lasted about 20 seconds, then he "went limp". She says he had a few more brief twitches afterwards. He was lethargic after the episode, somewhat confused, and his speech was not back to baseline.     The patient remembers feeling strange before the episode, and knew that he could not talk. He said he was trying to warn his mother because he feared something was about to happen, then he lost consciousness. He remembers next waking up in the bed feeling disoriented. He bit his lip but did not have bowel or bladder incontinence. He has never had an episode like this before, no febrile seizures, meningitis, encephalitis or head trauma. He has no family history of seizures. His brother has a history of breath holding spells as a child.     He was on tacrolimus but this was discontinued this morning, and he is on mycophenolate and prednisone.      Past Medical History:   Diagnosis Date    Alcoholic hepatitis with ascites     Alcoholic hepatitis with " Pt with recent CATH- on Aug/2023- on DAPT  Patient went chest pain this morning, troponin pending.  Cardio made aware.  Chest pain appears atypical..  Plavix resumed, okay with Orthopedic.  C/w statin and aspirin  Other GDMT   ascites     History of hematemesis 9/28/2017    Hypertension        Past Surgical History:   Procedure Laterality Date    APPENDECTOMY         Review of patient's allergies indicates:   Allergen Reactions    Bactrim [sulfamethoxazole-trimethoprim] Other (See Comments)     Mookie Trell Syndrome       No current facility-administered medications on file prior to encounter.      No current outpatient prescriptions on file prior to encounter.     Family History     None        Social History Main Topics    Smoking status: Former Smoker     Types: Cigarettes    Smokeless tobacco: Never Used    Alcohol use Yes      Comment: a fifth of liquor daily for years, cut back over last 2 months    Drug use: No    Sexual activity: Not on file     Review of Systems   Constitutional: Negative for chills and fever.   HENT: Negative for trouble swallowing.    Eyes: Negative for visual disturbance.   Respiratory: Negative for chest tightness and shortness of breath.    Cardiovascular: Positive for leg swelling. Negative for chest pain.   Gastrointestinal: Negative for abdominal pain, nausea and vomiting.   Musculoskeletal: Negative for back pain and myalgias.   Skin: Positive for color change.   Neurological: Positive for seizures. Negative for tremors, weakness and numbness.     Objective:     Vital Signs (Most Recent):  Temp: 98.4 °F (36.9 °C) (11/02/17 1222)  Pulse: 97 (11/02/17 1500)  Resp: 18 (11/02/17 1222)  BP: 123/75 (11/02/17 1222)  SpO2: 98 % (11/02/17 1222) Vital Signs (24h Range):  Temp:  [98.3 °F (36.8 °C)-99.4 °F (37.4 °C)] 98.4 °F (36.9 °C)  Pulse:  [] 97  Resp:  [18] 18  SpO2:  [98 %-100 %] 98 %  BP: (117-133)/(59-80) 123/75     Weight: 75.8 kg (167 lb 1.7 oz)  Body mass index is 26.17 kg/m².    Physical Exam   Constitutional: He is oriented to person, place, and time. He appears well-developed and well-nourished.   HENT:   Head: Normocephalic and atraumatic.   Eyes: EOM are normal. Pupils are equal,  round, and reactive to light. Scleral icterus is present.   Cardiovascular: Normal rate.    Pulmonary/Chest: Effort normal. No respiratory distress.   Musculoskeletal: He exhibits edema.   Neurological: He is oriented to person, place, and time. He has a normal Finger-Nose-Finger Test.   Reflex Scores:       Tricep reflexes are 2+ on the right side and 2+ on the left side.       Bicep reflexes are 2+ on the right side and 2+ on the left side.       Brachioradialis reflexes are 2+ on the right side and 2+ on the left side.       Patellar reflexes are 2+ on the right side and 2+ on the left side.  Skin: Skin is warm and dry.   jaundice       NEUROLOGICAL EXAMINATION:     MENTAL STATUS   Oriented to person, place, and time.   Level of consciousness: alert    CRANIAL NERVES     CN III, IV, VI   Pupils are equal, round, and reactive to light.  Extraocular motions are normal.     CN V   Facial sensation intact.     CN VII   Facial expression full, symmetric.     CN VIII   CN VIII normal.     MOTOR EXAM   Muscle bulk: normal  Overall muscle tone: normal    Strength   Strength 5/5 except as noted.   Right triceps: 4/5  Left triceps: 4/5    REFLEXES     Reflexes   Right brachioradialis: 2+  Left brachioradialis: 2+  Right biceps: 2+  Left biceps: 2+  Right triceps: 2+  Left triceps: 2+  Right patellar: 2+  Left patellar: 2+  Right plantar: equivocal  Left plantar: equivocal  Right ankle clonus: absent  Left ankle clonus: absent    SENSORY EXAM   Light touch normal.     GAIT AND COORDINATION      Coordination   Finger to nose coordination: normal    Tremor   Resting tremor: absent  Intention tremor: absent      Significant Labs:   CBC:   Recent Labs  Lab 11/01/17  0701 11/01/17  0805 11/02/17  0440 11/02/17  0711   WBC 11.90 11.09 7.93  --    HGB 8.8* 7.8* 7.5* 8.5*   HCT 26.5* 22.9* 23.0* 24.1*   * 131* 126*  --      CMP:   Recent Labs  Lab 11/01/17  0701 11/01/17  0805 11/02/17  0440   GLU 86 91 69*   * 134*  136   K 3.8 3.5 3.6    102 103   CO2 19* 22* 22*   BUN 37* 38* 24*   CREATININE 4.1* 4.0* 3.3*   CALCIUM 8.6* 8.5* 8.2*   MG 2.0 1.9 1.7   PROT 4.9* 4.7* 4.6*   ALBUMIN 2.6* 2.4* 2.3*   BILITOT 2.9* 2.7* 2.6*   ALKPHOS 195* 195* 252*   AST 19 17 18   ALT 9* 10 7*   ANIONGAP 12 10 11   EGFRNONAA 18.5* 19.1* 24.1*       Significant Imaging: I have reviewed all pertinent imaging results/findings within the past 24 hours.    Assessment and Plan:     * Liver transplant 10/19/2017 for acute alcoholic hepatitis    -- Managed by Transplant team          Seizure    27 year old man with alcohol abuse, s/p OLT, and LAURA requiring HD who had an episode consistent with seizure on 11/2. He is at risk for seizure due to his metabolic derangements and immunosuppressant medication, as well as self reported sleep deprivation. At this time we do not recommend starting an AED as the risks outweigh the benefits in a first time seizure. However if he has any further events he will require treatment.    -- MRI and EEG pending  -- No AED at this time  -- Seizure precautions  -- If he has another seizure, load with 1g keppra and start keppra 500mg twice daily        Long-term use of immunosuppressant medication    -- lowers seizure threshold  -- on mycophenolate  -- tacrolimus d/c 11/2        LAURA (acute kidney injury)    -- risk factor for seizures  -- on HD            VTE Risk Mitigation         Ordered     heparin (porcine) injection 5,000 Units  Every 8 hours     Route:  Subcutaneous        10/30/17 0703     heparin (porcine) injection 1,000 Units  As needed (PRN)     Route:  Intra-Catheter        10/30/17 1649     Place sequential compression device  Until discontinued      10/20/17 0346     High Risk of VTE  Once      10/20/17 0346          Thank you for your consult. I will follow-up with patient. Please contact us if you have any additional questions.    Norma Duvall MD  Neurology  [facility]

## 2023-10-17 NOTE — ASSESSMENT & PLAN NOTE
- no physical changes.  - relieved with po and iv pain medications.  - decrease oral pain meds to oxy 5-10 mg q 4 hr.  - added effexor and trazadone  - spoke with pain management, unable to nerve block due to platelets  - psychiatry on board to transition off narcotics   Detail Level: Zone

## 2023-11-05 NOTE — ASSESSMENT & PLAN NOTE
- Cr level remains elevated. No need for HD at this time.   - Last HD 11/17. Now with moderate UOP.   - IV Lasix started per nephrology recs.     - Cont strict I/O's.      ambulatory

## 2023-11-14 NOTE — PLAN OF CARE
Problem: Nutrition, Imbalanced: Inadequate Oral Intake (Adult)  Intervention: Promote/Optimize Nutrition    Recommendations     Recommendation/Intervention:   1. When G-J tube placed, resume previous order of Novasource at 55 ml/hr x 12 hrs and assess for tolerance.      2. If pt still unable to tolerate and renal electrolytes remain WNL, consider trialing Peptamen Prebio at a goal rate of 60 ml/hr x 14 hrs to provide 1260 kcal, 57 gm pro, and 647 ml free water.      3. Encourage PO intake as tolerated.      4. RD following.     Goals: Consume/tolerate > 85% EEN and EPN  Nutrition Goal Status: goal not met         Mother./spouse

## 2023-11-30 NOTE — PROGRESS NOTES
Group Topic: BH Activity Group    Date: 11/30/2023  Start Time: 1245  End Time: 1315  Facilitators: Juan Atkins [facility]    Focus: Music Group  Number in attendance: 2    Method: Group   Attendance: Absent  Patient Evaluation: Encouragement - needs prompts       [facility]  Nephrology  Progress Note    Patient Name: Jhonny Diana  MRN: [id number]  Admission Date: 9/27/2017  Hospital Length of Stay: 33 days  Attending Provider: Solis Chapa MD   Primary Care Physician: Provider Notinsystem  Principal Problem:Liver replaced by transplant    Subjective:     HPI: Jhonny Diana is a 26 yo male with pMHX of childhood asthma and alcohol abuse who was was transferred to [facility] for hepatology evaluation for worsening liver functions.  Information was obtained from family at bedside and review of outside records, given that patient is obtunded.  According to mother at bedside, patient has had multiple admissions to the hospital over the past 6 months for recurrent abdominal pain.  She reports that he has been been seen the ED and has been diagnosed with diverticulitis, UTIs, gastroenteritis, and cholecystitis.  He was never admitted to the hospital and was always discharged.  On 09/06, patient presented to OSH for worsening abdominal pain and distension.  He was diagnosed at that time with ETOH hepatitis.  He was treated with steroids, ceftriaxone for SBP prophylaxis, and diuresed at that time for volume management.  He was discharged home on the 15th, after clearance from GI,a nd was instructed to follow-up with his PCP 1 week after discharge.  He presented to the hospital on 09/25 for worsening abdominal pain and distension with 20 kg weight gain.  He was transferred to [facility] for further care and Nephrology was consulted for LAURA.    On review of outside records, patient Scr on admission was 1.5 (with baseline at 1.0).  According to the family at the bedside, he was not urinating well at the OSH, and not responding to diuretics (spirionolactone/lasix combo).  His mother reports that patient continued to c/o abdominal and inability to urinate, but no chicas was placed until transfer to [facility] yesterday.  Record reviews shows relative hypotension with BPs in the low 90's.  His  mother reports that the nursing staff was administrating his narcotic regimen based on his blood pressures, and at times, medication was held due to hypotension.  Since admission to hospital, his UOP has improved, responding well in Lasix 40 IV (770 ml since admission, 700 ml so far this shift at time of consultation).  Admit labs revealed a SCr of 2.6, this morning improved down to 2.4.  Bilirubin remains elevated, >14.  Abdomen remains with tight distension, unable to perform paracentesis, as no significant fluid pocket found.  Mother reports patient did not have para's done at OSH.    Labs on the morning of 09/27 with bicarb of 15.  ABG with alkalemia and primary respiratory alkalosis and anion gap acidosis.     Interval History:   Last received HD on Saturday with 2L removed. UOP ~200cc per day. Doing okay this morning. NPO for tunneled HD catheter. Denies any SOB.     Review of patient's allergies indicates:   Allergen Reactions    Bactrim [sulfamethoxazole-trimethoprim] Other (See Comments)     Mookie Trell Syndrome     Current Facility-Administered Medications   Medication Frequency    0.9%  NaCl infusion PRN    acetaminophen tablet 500 mg Q6H    albumin human 25% bottle 25 g Q6H    atovaquone suspension 1,500 mg Daily    bisacodyl EC tablet 10 mg Daily    bisacodyl suppository 10 mg Once    dextrose 50% injection 12.5 g PRN    dextrose 50% injection 25 g PRN    docusate sodium capsule 100 mg TID PC    ergocalciferol capsule 50,000 Units Q7 Days    famotidine tablet 20 mg QHS    fluconazole tablet 200 mg Daily    glucagon (human recombinant) injection 1 mg PRN    glucose chewable tablet 16 g PRN    glucose chewable tablet 24 g PRN    heparin (porcine) injection 5,000 Units Q8H    HYDROmorphone injection 0.2 mg Once    insulin aspart pen 0-5 Units QID (AC + HS) PRN    magnesium sulfate 2 g/50 ml IVPB Q2H    mycophenolate capsule 1,000 mg BID    oxyCODONE immediate release tablet 5 mg  Q6H PRN    polyethylene glycol packet 17 g Daily    predniSONE tablet 20 mg Daily    simethicone chewable tablet 80 mg TID PRN    sodium bicarbonate tablet 1,300 mg BID    tacrolimus capsule 3 mg BID    traMADol tablet 50 mg Q4H PRN    valganciclovir 50 mg/ml oral solution 100 mg Every Mon, Wed, Fri       Objective:     Vital Signs (Most Recent):  Temp: 98.2 °F (36.8 °C) (10/30/17 0800)  Pulse: 103 (10/30/17 0815)  Resp: 18 (10/30/17 0815)  BP: 129/71 (10/30/17 0800)  SpO2: 99 % (10/30/17 0815)  O2 Device (Oxygen Therapy): room air (10/30/17 0815) Vital Signs (24h Range):  Temp:  [97.9 °F (36.6 °C)-98.7 °F (37.1 °C)] 98.2 °F (36.8 °C)  Pulse:  [] 103  Resp:  [16-20] 18  SpO2:  [97 %-100 %] 99 %  BP: (119-133)/(61-71) 129/71     Weight: 79.3 kg (174 lb 13.2 oz) (10/30/17 0300)  Body mass index is 27.38 kg/m².  Body surface area is 1.94 meters squared.    I/O last 3 completed shifts:  In: 1130 [P.O.:1130]  Out: 485 [Urine:485]    Physical Exam   Constitutional: He is oriented to person, place, and time. He appears well-developed and well-nourished. No distress.   HENT:   Head: Normocephalic and atraumatic.   Eyes: EOM are normal. Scleral icterus is present.   Cardiovascular: Normal rate and regular rhythm.    Pulmonary/Chest: Effort normal and breath sounds normal. He has no rales.   Abdominal: Soft. He exhibits distension.   Musculoskeletal: He exhibits edema. He exhibits no deformity.   Neurological: He is alert and oriented to person, place, and time.   Skin: He is not diaphoretic.       Significant Labs:  CBC:   Recent Labs  Lab 10/30/17  0450   WBC 9.97   RBC 2.85*   HGB 9.1*   HCT 25.7*   *   MCV 90   MCH 31.9*   MCHC 35.4     CMP:   Recent Labs  Lab 10/30/17  0450   GLU 70   CALCIUM 8.2*   ALBUMIN 2.5*   PROT 4.8*   *   K 3.6   CO2 19*      BUN 41*   CREATININE 3.9*   ALKPHOS 175*   ALT 8*   AST 20   BILITOT 3.3*     All labs within the past 24 hours have been reviewed.      Significant Imaging:  Labs: Reviewed    Assessment/Plan:     LAURA (acute kidney injury)    - likely ischemic ATN from decreased renal perfusion (volume shifts from paracentesis, anemia). Urine sediment with muddy brown casts  - underwent OLTx on 10/19; received intraop CRRT  - remains dialysis dependent at this time  - currently oliguric  - last HD on Saturday  - planning for perm cath today  - will arrange for HD today with UF goal 2-3L to help with hypervolemia  - next treatment tentatively planned for Wednesday             Moon Carlton, PGY-5  Nephrology Fellow  [facility]  Pager: [phone]    ATTENDING PHYSICIAN ATTESTATION  I have personally interviewed and examined the patient. I thoroughly reviewed the demographic, clinical, laboratorial and imaging information available in medical records. I agree with the assessment and recommendations provided by the subspecialty resident. Dr. Carlton was under my supervision.

## 2023-12-22 ENCOUNTER — PATIENT MESSAGE (OUTPATIENT)
Dept: HEPATOLOGY | Facility: CLINIC | Age: 33
End: 2023-12-22
Payer: MEDICARE

## 2023-12-24 ENCOUNTER — PATIENT MESSAGE (OUTPATIENT)
Dept: HEPATOLOGY | Facility: CLINIC | Age: 33
End: 2023-12-24
Payer: MEDICARE

## 2023-12-29 LAB
EXT ALBUMIN: 3.1
EXT ALBUMIN: 3.2
EXT ALKALINE PHOSPHATASE: 187
EXT ALKALINE PHOSPHATASE: 193
EXT ALT: 13
EXT ALT: 14
EXT AST: 34
EXT AST: 35
EXT BASOPHIL%: 0.6
EXT BASOPHIL%: 0.8
EXT BILIRUBIN TOTAL: 1.7
EXT BILIRUBIN TOTAL: 1.9
EXT BUN: 20
EXT BUN: 31
EXT CALCIUM: 9.3
EXT CALCIUM: 9.3
EXT CHLORIDE: 104
EXT CHLORIDE: 106
EXT CO2: 23
EXT CO2: 27
EXT CREATININE: 1.71 MG/DL
EXT CREATININE: 2.54 MG/DL
EXT EGFR NO RACE VARIABLE: 33
EXT EGFR NO RACE VARIABLE: 53
EXT EOSINOPHIL%: 4
EXT EOSINOPHIL%: 4.3
EXT GLUCOSE: 125
EXT GLUCOSE: 138
EXT HEMATOCRIT: 29.5
EXT HEMATOCRIT: 31.1
EXT HEMOGLOBIN: 10.4
EXT HEMOGLOBIN: 10.9
EXT LYMPH%: 24.6
EXT LYMPH%: 25.4
EXT MONOCYTES%: 10.1
EXT MONOCYTES%: 7.5
EXT PLATELETS: 111
EXT PLATELETS: 99
EXT POTASSIUM: 3.2
EXT POTASSIUM: 3.6
EXT PROTEIN TOTAL: 7.1
EXT PROTEIN TOTAL: 7.1
EXT SEGS%: 60.7
EXT SEGS%: 62
EXT SIROLIMUS LVL: 7.3
EXT SIROLIMUS LVL: 7.3
EXT SODIUM: 138 MMOL/L
EXT SODIUM: 139 MMOL/L
EXT WBC: 4.9
EXT WBC: 5.3

## 2024-01-02 DIAGNOSIS — Z94.4 LIVER TRANSPLANTED: ICD-10-CM

## 2024-01-02 NOTE — TELEPHONE ENCOUNTER
----- Message from Darnell Julio MD sent at 12/31/2023  8:10 AM CST -----  Liver transplant blood tests reviewed. Labs stable, no change in immunosuppression.   Please continue to monitor liver tests per transplant protocol.

## 2024-01-03 ENCOUNTER — TELEPHONE (OUTPATIENT)
Dept: TRANSPLANT | Facility: CLINIC | Age: 34
End: 2024-01-03
Payer: MEDICARE

## 2024-01-03 ENCOUNTER — PATIENT MESSAGE (OUTPATIENT)
Dept: TRANSPLANT | Facility: CLINIC | Age: 34
End: 2024-01-03
Payer: MEDICARE

## 2024-01-03 DIAGNOSIS — R79.89 ELEVATED LFTS: Primary | ICD-10-CM

## 2024-01-03 DIAGNOSIS — Z94.4 LIVER TRANSPLANTED: ICD-10-CM

## 2024-01-03 RX ORDER — SIROLIMUS 1 MG/1
1 TABLET, FILM COATED ORAL DAILY
Qty: 30 TABLET | Refills: 11 | Status: SHIPPED | OUTPATIENT
Start: 2024-01-03 | End: 2025-01-02

## 2024-01-03 NOTE — TELEPHONE ENCOUNTER
----- Message from Darnell Julio MD sent at 1/2/2024  3:58 PM CST -----  Recheck the labs and US liver    ----- Message -----  From: Laurence Santos, RN  Sent: 1/2/2024   2:56 PM CST  To: Darnell Julio MD    Hi Dr. Julio,    I am sending you a message the patient sent last week regarding his health and current issues.  Please advise of any recommendations.  Thanks      My PCP has been keeping an eye on my liver and kidney numbers. You guys are supposed to send in orders for more blood work at [facility].      She insists I follow up with my hepatologist, yourself, due to the drastic increase in bilirubin. I have recently had cellulitis and pneumonia. A large amount of Tylenol was administered to keep fever down (fever was 103+ so the Tylenol was necessary). Levaquin was used to treat the pneumonia. Doxycycline was used to treat the cellulitis.      These are the only things that have changed recently since that test. More tests coming. We need to schedule a visit and before you ever ask, no, I'm not okay. I'm in severe pain, my  went on vacation,and I can't do anything about this.     ----- Message -----  From: Darnell Julio MD  Sent: 12/31/2023   8:10 AM CST  To: [facility] Post-Liver Transplant Clinical    Liver transplant blood tests reviewed. Labs stable, no change in immunosuppression.   Please continue to monitor liver tests per transplant protocol.

## 2024-01-03 NOTE — TELEPHONE ENCOUNTER
Labs reviewed via portal.  Will have US and labs 1/5/24 prior to clinic visit with Dr. Morgan.  Pt requested to be seen ASAP with 1st available provider.    ----- Message from Darnell Julio MD sent at 1/2/2024  3:58 PM CST -----  Recheck the labs and US liver    ----- Message -----  From: Laurence Santos RN  Sent: 1/2/2024   2:56 PM CST  To: Darnell Julio MD    Hi Dr. Julio,    I am sending you a message the patient sent last week regarding his health and current issues.  Please advise of any recommendations.  Thanks        ----- Message -----  From: Darnell Julio MD  Sent: 12/31/2023   8:10 AM CST  To: [facility] Post-Liver Transplant Clinical    Liver transplant blood tests reviewed. Labs stable, no change in immunosuppression.   Please continue to monitor liver tests per transplant protocol.

## 2024-01-03 NOTE — TELEPHONE ENCOUNTER
----- Message from Darnell Julio MD sent at 1/2/2024  3:58 PM CST -----  Recheck the labs and US liver    ----- Message -----  From: Laurence Santos, RN  Sent: 1/2/2024   2:56 PM CST  To: Darnell Julio MD    Hi Dr. Julio,    I am sending you a message the patient sent last week regarding his health and current issues.  Please advise of any recommendations.  Thanks      My PCP has been keeping an eye on my liver and kidney numbers. You guys are supposed to send in orders for more blood work at [facility].      She insists I follow up with my hepatologist, yourself, due to the drastic increase in bilirubin. I have recently had cellulitis and pneumonia. A large amount of Tylenol was administered to keep fever down (fever was 103+ so the Tylenol was necessary). Levaquin was used to treat the pneumonia. Doxycycline was used to treat the cellulitis.      These are the only things that have changed recently since that test. More tests coming. We need to schedule a visit and before you ever ask, no, I'm not okay. I'm in severe pain, my  went on vacation,and I can't do anything about this.     ----- Message -----  From: Darnell Julio MD  Sent: 12/31/2023   8:10 AM CST  To: [facility] Post-Liver Transplant Clinical    Liver transplant blood tests reviewed. Labs stable, no change in immunosuppression.   Please continue to monitor liver tests per transplant protocol.

## 2024-01-04 ENCOUNTER — TELEPHONE (OUTPATIENT)
Dept: TRANSPLANT | Facility: CLINIC | Age: 34
End: 2024-01-04
Payer: MEDICARE

## 2024-01-04 ENCOUNTER — TELEPHONE (OUTPATIENT)
Dept: HEPATOLOGY | Facility: CLINIC | Age: 34
End: 2024-01-04
Payer: MEDICARE

## 2024-01-04 DIAGNOSIS — Z94.4 LIVER TRANSPLANTED: Primary | ICD-10-CM

## 2024-01-04 DIAGNOSIS — R79.89 ELEVATED LFTS: ICD-10-CM

## 2024-01-04 LAB
EXT ALBUMIN: 3.4
EXT ALKALINE PHOSPHATASE: 179
EXT ALT: 13
EXT AST: 33
EXT BASOPHIL%: 1.2
EXT BILIRUBIN TOTAL: 2.2
EXT BUN: 19
EXT CALCIUM: 9.4
EXT CHLORIDE: 107
EXT CO2: 20
EXT CREATININE: 1.58 MG/DL
EXT EOSINOPHIL%: 4.9
EXT GLUCOSE: 81
EXT HEMATOCRIT: 35.2
EXT HEMOGLOBIN: 12.2
EXT LYMPH%: 47.1
EXT MONOCYTES%: 11
EXT PLATELETS: 92
EXT POTASSIUM: 4
EXT PROTEIN TOTAL: 7.3
EXT SEGS%: 35.8
EXT SIROLIMUS LVL: 8.1
EXT SODIUM: 137 MMOL/L
EXT WBC: 4.7

## 2024-01-04 NOTE — TELEPHONE ENCOUNTER
----- Message from Mago Haile sent at 2024  1:36 PM CST -----  Regarding: Orders      Name Of Caller:     Martha nikita/ [facility] Mehta in [place], [place]      Contact Preference:   Phone #: [phone], Fax #: [phone]      Nature of call:   Ms Thomas is requesting new lab orders for the pt. The standing orders they had .

## 2024-01-04 NOTE — TELEPHONE ENCOUNTER
Labs reviewed via telephone.  Pt will follow up in clinic with Dr. Morgan 1/5/24.      ----- Message from Darnell Julio MD sent at 1/4/2024  9:30 AM CST -----  Liver transplant blood tests reviewed. Labs stable. Follow up clinic

## 2024-01-05 ENCOUNTER — OFFICE VISIT (OUTPATIENT)
Dept: TRANSPLANT | Facility: CLINIC | Age: 34
End: 2024-01-05
Payer: MEDICARE

## 2024-01-05 ENCOUNTER — HOSPITAL ENCOUNTER (OUTPATIENT)
Dept: RADIOLOGY | Facility: HOSPITAL | Age: 34
Discharge: HOME | End: 2024-01-05
Attending: INTERNAL MEDICINE
Payer: MEDICARE

## 2024-01-05 VITALS
BODY MASS INDEX: 24.91 KG/M2 | OXYGEN SATURATION: 96 % | HEIGHT: 66 IN | WEIGHT: 155 LBS | SYSTOLIC BLOOD PRESSURE: 129 MMHG | RESPIRATION RATE: 16 BRPM | HEART RATE: 73 BPM | DIASTOLIC BLOOD PRESSURE: 62 MMHG

## 2024-01-05 DIAGNOSIS — Z29.89 PROPHYLACTIC IMMUNOTHERAPY: Chronic | ICD-10-CM

## 2024-01-05 DIAGNOSIS — T86.49 BILIARY STRICTURE OF TRANSPLANTED LIVER: ICD-10-CM

## 2024-01-05 DIAGNOSIS — R74.8 ABNORMAL LIVER ENZYMES: ICD-10-CM

## 2024-01-05 DIAGNOSIS — R79.89 ELEVATED LFTS: ICD-10-CM

## 2024-01-05 DIAGNOSIS — Z94.4 STATUS POST LIVER TRANSPLANT: ICD-10-CM

## 2024-01-05 DIAGNOSIS — Z51.81 THERAPEUTIC DRUG MONITORING: ICD-10-CM

## 2024-01-05 DIAGNOSIS — K83.1 BILIARY STRICTURE OF TRANSPLANTED LIVER: ICD-10-CM

## 2024-01-05 DIAGNOSIS — Z94.4 LIVER TRANSPLANTED: ICD-10-CM

## 2024-01-05 PROCEDURE — 93976 VASCULAR STUDY: CPT | Mod: 26,,, | Performed by: STUDENT IN AN ORGANIZED HEALTH CARE EDUCATION/TRAINING PROGRAM

## 2024-01-05 PROCEDURE — 76705 ECHO EXAM OF ABDOMEN: CPT | Mod: 26,59,, | Performed by: STUDENT IN AN ORGANIZED HEALTH CARE EDUCATION/TRAINING PROGRAM

## 2024-01-05 PROCEDURE — 1159F MED LIST DOCD IN RCRD: CPT | Mod: CPTII,S$GLB,, | Performed by: INTERNAL MEDICINE

## 2024-01-05 PROCEDURE — 99214 OFFICE O/P EST MOD 30 MIN: CPT | Mod: S$GLB,,, | Performed by: INTERNAL MEDICINE

## 2024-01-05 PROCEDURE — 99999 PR PBB SHADOW E&M-EST. PATIENT-LVL III: CPT | Mod: PBBFAC,,, | Performed by: INTERNAL MEDICINE

## 2024-01-05 PROCEDURE — 1160F RVW MEDS BY RX/DR IN RCRD: CPT | Mod: CPTII,S$GLB,, | Performed by: INTERNAL MEDICINE

## 2024-01-05 PROCEDURE — 76705 ECHO EXAM OF ABDOMEN: CPT | Mod: TC

## 2024-01-05 PROCEDURE — 3074F SYST BP LT 130 MM HG: CPT | Mod: CPTII,S$GLB,, | Performed by: INTERNAL MEDICINE

## 2024-01-05 PROCEDURE — 3008F BODY MASS INDEX DOCD: CPT | Mod: CPTII,S$GLB,, | Performed by: INTERNAL MEDICINE

## 2024-01-05 PROCEDURE — 3078F DIAST BP <80 MM HG: CPT | Mod: CPTII,S$GLB,, | Performed by: INTERNAL MEDICINE

## 2024-01-05 RX ORDER — DIAZEPAM 2 MG/1
2 TABLET ORAL
COMMUNITY

## 2024-01-05 RX ORDER — VALACYCLOVIR HYDROCHLORIDE 500 MG/1
TABLET, FILM COATED ORAL
COMMUNITY
Start: 2023-07-10

## 2024-01-05 RX ORDER — CHOLECALCIFEROL (VITAMIN D3) 25 MCG
1000 TABLET ORAL DAILY
COMMUNITY

## 2024-01-05 RX ORDER — OXYCODONE HYDROCHLORIDE 15 MG/1
10 TABLET ORAL 3 TIMES DAILY
COMMUNITY

## 2024-01-05 RX ORDER — FLUCONAZOLE 100 MG/1
100 TABLET ORAL
COMMUNITY
Start: 2024-01-03 | End: 2024-01-10

## 2024-01-05 RX ORDER — DOXYCYCLINE 100 MG/1
100 CAPSULE ORAL 2 TIMES DAILY
COMMUNITY
Start: 2024-01-03 | End: 2024-01-10

## 2024-01-05 RX ORDER — TORSEMIDE 10 MG/1
TABLET ORAL
COMMUNITY
Start: 2023-10-04

## 2024-01-05 RX ORDER — NALOXONE HYDROCHLORIDE 4 MG/.1ML
SPRAY NASAL
COMMUNITY
Start: 2023-04-13

## 2024-01-05 RX ORDER — LAMOTRIGINE 25 MG/1
TABLET ORAL
COMMUNITY

## 2024-01-05 NOTE — PROGRESS NOTES
Transplant Hepatology  Liver Transplant Recipient Follow Up    PCP: Jhonny Angulo MD    Transplant History  Transplant Date: 10/19/2017  UNOS Native Liver Dx: Acute Alcoholic Hepatitis    ORGAN: LIVER  Whole or Partial: whole liver  Donor Type: donation after brain death  [facility] High Risk: no  Donor CMV Status: Positive  Donor HCV Status: Negative  Donor HBcAb: Negative  Donor HBV ANITA:   Donor HCV ANITA: Negative  Biliary Anastomosis: end to end  Arterial Anatomy: standard  IVC reconstruction: end to end ivc  Portal vein status: patent    He has had the following complications since transplant: LAURA previously on HD, anastomotic stricture, ETOH relapse (6/2019).     Chief complaint: follow up, history of OLT    HPI:  Jhonny Diana is a 33 y.o. male with history of OLT in 10/2017 for alcohol related cirrhosis / acute alcohol related hepatitis who presents for follow up.     He is without complaints today. He reports compliance with sirolimus. He denies recent fever, chills, nausea, vomiting, diarrhea, headache, tremors.    Past Medical History:   Diagnosis Date    Abdominal pain 5/27/2018    Alcoholic cirrhosis 8/14/2018    Alcoholic hepatitis with ascites     Anemia     CKD (chronic kidney disease) stage 3, GFR 30-59 ml/min 8/14/2018    Encounter for blood transfusion     multiple blood transfusions    ESRD on dialysis     History of hematemesis 9/28/2017    Hypertension     Seizures     Substance abuse     alcohol and opioids    Thrombocytopenia     Transplanted liver     10/2017       Past Surgical History:   Procedure Laterality Date    APPENDECTOMY      CENTRAL VENOUS CATHETER TUNNELED INSERTION DOUBLE LUMEN      inserted and removed    ERCP      ERCP N/A 7/2/2018    Procedure: ERCP;  Surgeon: Solis Villanueva MD;  Location: [facility] ([address] [place] [address]);  Service: Endoscopy;  Laterality: N/A;  dialysis/cirrhosis/labs prior to ERCP/svn  platelet    ESOPHAGOGASTRODUODENOSCOPY      ESOPHAGOGASTRODUODENOSCOPY N/A 11/25/2019     Procedure: EGD (ESOPHAGOGASTRODUODENOSCOPY);  Surgeon: Sheri Siddiqui MD;  Location: [address]);  Service: Endoscopy;  Laterality: N/A;    LIVER TRANSPLANT      10/2017       Family History   Problem Relation Age of Onset    No Known Problems Mother     Cancer Father         Lung cancer    Alcohol abuse Father     No Known Problems Sister     No Known Problems Brother     Kidney disease Neg Hx        Social History     Tobacco Use    Smoking status: Former     Current packs/day: 0.00     Average packs/day: 1 pack/day for 10.0 years (10.0 ttl pk-yrs)     Types: Cigarettes     Start date: 2007     Quit date: 2017     Years since quittin.8    Smokeless tobacco: Never   Substance Use Topics    Alcohol use: No     Comment: a fifth of liquor daily for years, cut back over last 2 months    Drug use: No       Current Outpatient Medications   Medication Sig Dispense Refill    diazePAM (VALIUM) 2 MG tablet Take 2 mg by mouth.      doxycycline (MONODOX) 100 MG capsule Take 100 mg by mouth 2 (two) times daily.      fluconazole (DIFLUCAN) 100 MG tablet Take 100 mg by mouth.      lamoTRIgine (LAMICTAL) 25 MG tablet       naloxone (NARCAN) 4 mg/actuation Spry CALL 911. SPR CONTENTS OF ONE SPRAYER (0.1ML) INTO ONE NOSTRIL. REPEAT IN 2-3 MIN IF SYMPTOMS OF OPIOID EMERGENCY PERSIST, ALTERNATE NOSTRILS      oxyCODONE (ROXICODONE) 15 MG Tab Take 10 mg by mouth 3 (three) times daily.      sirolimus (RAPAMUNE) 1 MG Tab Take 1 tablet (1 mg total) by mouth once daily. 30 tablet 11    tiZANidine (ZANAFLEX) 4 MG tablet Take 4 mg by mouth every 6 (six) hours as needed.      torsemide (DEMADEX) 10 MG Tab       valACYclovir (VALTREX) 500 MG tablet       vitamin D (VITAMIN D3) 1000 units Tab Take 1,000 Units by mouth once daily.      DULoxetine (CYMBALTA) 20 MG capsule Take 20 mg by mouth every evening.      HYDROcodone-acetaminophen (NORCO) 7.5-325 mg per tablet Take 1 tablet by mouth every 24 hours as needed for  "Pain. Pt prescribed for pain management      losartan (COZAAR) 25 MG tablet TAKE 2 TABLETS(50 MG) BY MOUTH EVERY DAY (Patient not taking: Reported on 1/5/2024) 90 tablet 6    oxyCODONE (ROXICODONE) 5 MG immediate release tablet Take 1 tablet (5 mg total) by mouth every 6 (six) hours as needed for Pain (Take if still in pain after taking both tylenol and ibuprofen). 12 tablet 0     No current facility-administered medications for this visit.       Review of patient's allergies indicates:   Allergen Reactions    Bactrim [sulfamethoxazole-trimethoprim] Other (See Comments)     Mookie Trell Syndrome            Vitals:    01/05/24 1032   BP: 129/62   Pulse: 73   Resp: 16   SpO2: 96%   Weight: 70.3 kg (155 lb)   Height: 5' 6" (1.676 m)     Body mass index is 25.02 kg/m².    Physical Exam  Constitutional:       General: He is not in acute distress.  Eyes:      General: No scleral icterus.  Cardiovascular:      Rate and Rhythm: Normal rate.   Pulmonary:      Effort: No respiratory distress.   Abdominal:      General: Abdomen is flat. There is no distension.      Palpations: Abdomen is soft.      Tenderness: There is no abdominal tenderness.   Musculoskeletal:         General: No swelling.   Skin:     Coloration: Skin is not jaundiced.   Neurological:      General: No focal deficit present.      Mental Status: He is alert.   Psychiatric:         Thought Content: Thought content normal.         LABS:  Lab Results   Component Value Date    WBC 5.68 12/11/2019    HGB 8.8 (L) 12/11/2019    HCT 29.2 (L) 12/11/2019    MCV 89 12/11/2019     12/11/2019       Lab Results   Component Value Date     12/11/2019    K 4.1 12/11/2019     12/11/2019    CO2 21 (L) 12/11/2019    BUN 12 12/11/2019    CREATININE 1.9 (H) 12/11/2019    CALCIUM 8.4 (L) 12/11/2019    ANIONGAP 10 12/11/2019    ESTGFRAFRICA 53.5 (A) 12/11/2019    EGFRNONAA 46.3 (A) 12/11/2019       Lab Results   Component Value Date    ALT 7 (L) 12/11/2019    AST " 22 12/11/2019     (H) 10/26/2017    ALKPHOS 139 (H) 12/11/2019    BILITOT 0.3 12/11/2019       Lab Results   Component Value Date    TACROLIMUS <1.5 (L) 03/04/2018         Assessment:   Jhonny Diana is a 33 y.o. male with history of OLT in 10/2017 for alcohol related cirrhosis / acute alcohol related hepatitis who presents for follow up.    1. Status post liver transplant    2. Abnormal liver enzymes    3. Prophylactic immunotherapy    4. Biliary stricture of transplanted liver    5. Therapeutic drug monitoring      Recommendations:  Allograft Function  - Excellent graft function. Stable elevation of ALP.  - US liver transplant unremarkable (1/5/23)  - Plan for CMV PCR, and MRCP to eval chronic cholestasis.    Immunosuppression   - Continue sirolimus 1mg daily.    Kidney function   - Creatinine 1.58.  - Avoid NSAIDs as able and maintain adequate hydration    Health Maintenance/Screening:  - Recommend age appropriate cancer screening  - Skin cancer: Recommend use of sunscreen SPF 30 or higher, hat and sunglasses while outside, dermatologist visit annually or sooner if any concerning lesions.  - Osteoporosis: Recommend bone density testing every 2-3 years if previously normal or annually if previously abnormal.    Return to clinic in 6 months.    UNOS Patient Status  Functional Status: 70% - Cares for self: unable to carry on normal activity or active work  Physical Capacity: Limited Mobility    I spent a total of 30 minutes on the day of the visit. This includes face to face time and non-face to face time preparing to see the patient (eg, review of tests), obtaining and/or reviewing separately obtained history, documenting clinical information in the electronic or other health record, independently interpreting results, and communicating results to the patient/family/caregiver, or care coordination.    This note includes dictation done using [employer] speech recognition program. Word recognition mistakes  are occasionally missed on review.    Laura Morgan MD  Staff Physician  Hepatology and Liver Transplant  [facility]  [facility]

## 2024-01-05 NOTE — LETTER
January 6, 2024                     [facility]  [address]  [facility] [address]  Phone: [phone]   Patient: Jhonny Diana   MR Number: [id number]   YOB: 1990   Date of Visit: 1/5/2024       Dear      Thank you for referring Jhonny Diana to me for evaluation. Attached you will find relevant portions of my assessment and plan of care.    If you have questions, please do not hesitate to call me. I look forward to following Jhonny Diana along with you.    Sincerely,    Laura Morgan MD    Enclosure    If you would like to receive this communication electronically, please contact [email or web address] or [phone] to request [employer] Link access.    [employer] Link is a tool which provides read-only access to select patient information with whom you have a relationship. Its easy to use and provides real time access to review your patients record including encounter summaries, notes, results, and demographic information.    If you feel you have received this communication in error or would no longer like to receive these types of communications, please e-mail [email or web address]

## 2024-01-05 NOTE — Clinical Note
Plan to repeat liver enzymes in 2-4 weeks with CMV PCR and PETH.  MRCP to r/o biliary stricture. No changes to immunosuppression.  RTC in 6 months.

## 2024-01-08 ENCOUNTER — TELEPHONE (OUTPATIENT)
Dept: TRANSPLANT | Facility: CLINIC | Age: 34
End: 2024-01-08
Payer: MEDICARE

## 2024-01-08 DIAGNOSIS — Z94.4 STATUS POST LIVER TRANSPLANT: ICD-10-CM

## 2024-01-08 DIAGNOSIS — R74.8 ABNORMAL LIVER ENZYMES: Primary | ICD-10-CM

## 2024-01-08 NOTE — TELEPHONE ENCOUNTER
Reviewed labs, US and clinic visit plan of care via telephone.  MRCP scheduled 1/30/24, will notify writer via portal when current abx therapy is complete to repeat labs. (Per discussion with Dr. Morgan in clinic).      ----- Message from Laura Morgan MD sent at 1/8/2024 10:47 AM CST -----  Results discussed with patient during clinic appt on 1/5/24. ALP remains elevated. Plan for repeat labs with CMV, PETH, as well as MRCP to evaluate for biliary stricture. Thank you.

## 2024-01-08 NOTE — TELEPHONE ENCOUNTER
Reviewed Plan of Care with patient and all questions answered.  MRCP and labs will be scheduled in 1-2 weeks.    ----- Message from Laura Morgan MD sent at 1/6/2024 12:44 PM CST -----  Plan to repeat liver enzymes in 2-4 weeks with CMV PCR and PETH.   MRCP to r/o biliary stricture.  No changes to immunosuppression.   RTC in 6 months.

## 2024-01-09 ENCOUNTER — PATIENT MESSAGE (OUTPATIENT)
Dept: HEPATOLOGY | Facility: CLINIC | Age: 34
End: 2024-01-09
Payer: MEDICARE

## 2024-01-09 ENCOUNTER — PATIENT MESSAGE (OUTPATIENT)
Dept: TRANSPLANT | Facility: CLINIC | Age: 34
End: 2024-01-09
Payer: MEDICARE

## 2024-01-11 NOTE — ASSESSMENT & PLAN NOTE
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, CMV D+/R+, steroid induction, on maintenance sirolimus; c/b seizures, LAURA, superificial wound infection s/p wound vac closure, culture negative peritonitis due to suspected indolent biliary leakage s/p repeated sphincterotomy/biliary stent placement, subsequent loculated RLQ collection s/p perc-drainage and prolonged empiric antibiotics course, gastritis/duodenal ulcer 3/6/2018, and CMV infection 3/2018) who was admitted on 11/22/2019 as a transfer from an OSH in [facility] (close to 2wks into admission) with low grade temps (peak 100F), N/V, and RUQ abdominal pain due to enteritis of unknown etiology (seen on MRI abdomen 11/21 in distal duodenum at OSH; notable no significant ascites, fluid collection, or pancreatitis on that study; patient denies associated diarrhea or bloody stools). He is stable on empiric GCV with differential most notable for CMV (moreso than community acquired viral infection that would have run its course likely by now).    - will observe off of antibiotics given no response to prolonged course or evidence in imaging/cx of bacterial infection  - will continue induction GCV today (2.5mg/kg IV q24h for current CrCl) and observe response -- if stable and tolerating PO still tomorrow, ok to discharge on induction valcyte (dosed per CrCl) while awaiting biopsies outpatient  - will f/u CMV quant and endoscopy biopsies -- of note, biopsies were unable to be taking of enhancing distal duodenum so may not be representative if unremarkable  - if discharged tomorrow, will request follow-up in ID clinic for review of biopsies   [FreeTextEntry1] : Ms. Cooley a 61 year old female presenting to the office for a follow up visit for allergic rhinitis, asthma, BOOP, sicca, and shortness of breath. Her chief complaint is   -she notes feeling generally well -she notes energy levels are good -she notes vision is stable -she notes good quality of sleep -she notes sleeping for 8.5 hours  -she notes exercising (tennis)  -she notes diet is good -she notes appetite is stable -she notes bowels are regular -she notes exposure to children under the age of 10  -she denies any headaches, nausea, emesis, fever, chills, sweats, chest pain, chest pressure, coughing, wheezing, palpitations, diarrhea, constipation, dysphagia, vertigo, arthralgias, myalgias, leg swelling, itchy eyes, itchy ears, heartburn, reflux, or sour taste in the mouth.

## 2024-01-18 ENCOUNTER — TELEPHONE (OUTPATIENT)
Dept: HEPATOLOGY | Facility: CLINIC | Age: 34
End: 2024-01-18
Payer: MEDICARE

## 2024-01-30 LAB
EXT ALBUMIN: 3.7
EXT ALKALINE PHOSPHATASE: 182
EXT ALT: 26
EXT AST: 54
EXT BASOPHIL%: 0.6
EXT BILIRUBIN TOTAL: 1.8
EXT BUN: 18
EXT CALCIUM: 9.4
EXT CHLORIDE: 106
EXT CO2: 20
EXT CREATININE: 1.45 MG/DL
EXT EGFR NO RACE VARIABLE: 65
EXT EOSINOPHIL%: 9
EXT GLUCOSE: 83
EXT HEMATOCRIT: 39.6
EXT HEMOGLOBIN: 13.4
EXT LYMPH%: 47
EXT MONOCYTES%: 10.3
EXT PLATELETS: 80
EXT POTASSIUM: 3.8
EXT PROTEIN TOTAL: 7.6
EXT SEGS%: 33.1
EXT SIROLIMUS LVL: 6.9
EXT SODIUM: 136 MMOL/L
EXT WBC: 4.1

## 2024-02-19 LAB
EXT ABO: NORMAL
EXT AFP: NORMAL
EXT ALBUMIN: 3.5
EXT ALCOHOL, MEDICAL, SERUM: NORMAL
EXT ALKALINE PHOSPHATASE: 169
EXT ALT: 21
EXT AMMONIA LEVEL: NORMAL
EXT AMYLASE: NORMAL
EXT AST: 40
EXT BACTERIA UA: NORMAL
EXT BASOPHIL%: 1.1
EXT BILIRUBIN DIRECT: NORMAL
EXT BILIRUBIN TOTAL: 1.6
EXT BK VIRUS DNA QN PCR: NORMAL
EXT BK VIRUS DNA QUANT, PCR, URINE: NORMAL
EXT BLOOD (STOOL): NORMAL
EXT BLOOD CULTURE, ROUTINE: NORMAL
EXT BLOOD CULTURE: NORMAL
EXT BLOOD FUNGUS: NORMAL
EXT BLOOD WITH ANAEROBE: NORMAL
EXT BUN: 23
EXT CALCIUM: 8.8
EXT CDIFF (STOOL): NORMAL
EXT CHLORIDE: 23
EXT CHOLESTEROL: NORMAL
EXT CMV ANTIGENEMIA: NORMAL
EXT CMV DNA QUANT. BY PCR: NORMAL
EXT CO2: 23
EXT CREATININE UA: NORMAL
EXT CREATININE: 1.75 MG/DL
EXT CULTURE (STOOL): NORMAL
EXT CULTURE, BODY FLUID - BACTEC: NORMAL
EXT CYCLOSPORONE LEVEL: NORMAL
EXT DRUGS OF ABUSE CONFIRMATION: NORMAL
EXT EBV DNA BY PCR: NORMAL
EXT EBV DNA-COPIES/ML: NORMAL
EXT EBV IGG: NORMAL
EXT EBV IGM: NORMAL
EXT EGFR NO RACE VARIABLE: 52
EXT EOSINOPHIL%: 12.4
EXT ERYTHROPOIETIN: NORMAL
EXT FERRITIN: NORMAL
EXT FOLATE: NORMAL
EXT FUNGAL (STOOL): NORMAL
EXT G6PD QUAL: NORMAL
EXT GFR MDRD AF AMER: NORMAL
EXT GFR MDRD NON AF AMER: NORMAL
EXT GGT: NORMAL
EXT GLUCOSE: 88
EXT HBV DNA QUANT PCR: NORMAL
EXT HBV DNA, QUALITATIVE PCR: NORMAL
EXT HCV QUANT: NORMAL
EXT HDL: NORMAL
EXT HEMATOCRIT: 32.9
EXT HEMOGLOBIN A1C: NORMAL
EXT HEMOGLOBIN: 11
EXT HEP B S AB: NORMAL
EXT HEP B S AG: NORMAL
EXT HIV RNA QUANT PCR: NORMAL
EXT HIV: NORMAL
EXT IMMUNKNOW (NON-STIMULATED): NORMAL
EXT IMMUNKNOW (STIMULATED): NORMAL
EXT INR: NORMAL
EXT IRON SATURATION: NORMAL
EXT LDL CHOLESTEROL: NORMAL
EXT LIPASE: NORMAL
EXT LYMPH%: 36.1
EXT MAGNESIUM: NORMAL
EXT MONOCYTES%: 11.4
EXT O&P (STOOL): NORMAL
EXT PHOSPHORUS: NORMAL
EXT PLATELETS: 97
EXT POTASSIUM: 4
EXT PREALBUMIN: NORMAL
EXT PROT/CREAT RATIO UR: NORMAL
EXT PROTEIN TOTAL: 7
EXT PROTEIN UA: NORMAL
EXT PT: NORMAL
EXT PTH, INTACT: NORMAL
EXT PTT: NORMAL
EXT RBC UA: NORMAL
EXT RETICULOCYTE COUNT: NORMAL
EXT SARS COV-2 (COVID-19): NORMAL
EXT SEGS%: 39
EXT SIROLIMUS LVL: NORMAL
EXT SODIUM: 138 MMOL/L
EXT TACROLIMUS LVL: NORMAL
EXT TIBC: NORMAL
EXT TRIGLYCERIDES: NORMAL
EXT UNSATURATED IRON BINDING CAP.: NORMAL
EXT URIC ACID: NORMAL
EXT VIT D 1 25 DIHYDROXY: NORMAL
EXT VIT D 25 HYDROXY: NORMAL
EXT WBC (STOOL): NORMAL
EXT WBC UA: NORMAL
EXT WBC: 4.8

## 2024-02-21 LAB
EXT ALBUMIN: 3.5
EXT ALKALINE PHOSPHATASE: 169
EXT ALT: 21
EXT AST: 40
EXT BASOPHIL%: 1.1
EXT BILIRUBIN TOTAL: 1.6
EXT BUN: 23
EXT CALCIUM: 8.8
EXT CHLORIDE: 109
EXT CO2: 23
EXT CREATININE: 1.75 MG/DL
EXT EGFR NO RACE VARIABLE: 52
EXT EOSINOPHIL%: 12.4
EXT GLUCOSE: 88
EXT HEMATOCRIT: 32.9
EXT HEMOGLOBIN: 11
EXT LYMPH%: 36.1
EXT MONOCYTES%: 11.4
EXT PLATELETS: 97
EXT POTASSIUM: 4
EXT PROTEIN TOTAL: 7
EXT SEGS%: 39
EXT SODIUM: 138 MMOL/L
EXT WBC: 4.8

## 2024-02-23 LAB
EXT ALBUMIN: 3.5
EXT ALKALINE PHOSPHATASE: 169
EXT ALT: 21
EXT AST: 40
EXT BASOPHIL%: 1.1
EXT BILIRUBIN TOTAL: 1.6
EXT BUN: 23
EXT CALCIUM: 8.8
EXT CHLORIDE: 109
EXT CO2: 23
EXT CREATININE: 1.75 MG/DL
EXT EGFR NO RACE VARIABLE: 52
EXT EOSINOPHIL%: 12.4
EXT GLUCOSE: 88
EXT HEMATOCRIT: 32.9
EXT HEMOGLOBIN: 11
EXT LYMPH%: 36.1
EXT MONOCYTES%: 11.4
EXT PLATELETS: 97
EXT POTASSIUM: 4
EXT PROTEIN TOTAL: 7
EXT SEGS%: 39
EXT SIROLIMUS LVL: 6.9
EXT SODIUM: 138 MMOL/L
EXT WBC: 4.8

## 2024-02-29 ENCOUNTER — TELEPHONE (OUTPATIENT)
Dept: TRANSPLANT | Facility: CLINIC | Age: 34
End: 2024-02-29
Payer: MEDICARE

## 2024-02-29 ENCOUNTER — PATIENT MESSAGE (OUTPATIENT)
Dept: TRANSPLANT | Facility: CLINIC | Age: 34
End: 2024-02-29
Payer: MEDICARE

## 2024-02-29 NOTE — TELEPHONE ENCOUNTER
----- Message from Laura Morgan MD sent at 2/23/2024  8:57 AM CST -----  Labs are within acceptable ranges. Please check PETH with next set of labs. Will f/u MRCP. No changes to immunosuppression. Thank you.

## 2024-02-29 NOTE — TELEPHONE ENCOUNTER
Patient notified and instructed via [employer];    Your labs have been reviewed by ; no changes made. Repeat labs due 3/4/24. Thanks.

## 2024-03-11 LAB
EXT ALBUMIN: 3.6
EXT ALKALINE PHOSPHATASE: 180
EXT ALT: 31
EXT AST: 46
EXT BASOPHIL%: 1
EXT BILIRUBIN TOTAL: 1.5
EXT BUN: 23
EXT CALCIUM: 8.9
EXT CHLORIDE: 109
EXT CO2: 26
EXT CREATININE: 2.21 MG/DL
EXT EGFR NO RACE VARIABLE: 39
EXT EOSINOPHIL%: 7.7
EXT GLUCOSE: 85
EXT HEMATOCRIT: 36
EXT HEMOGLOBIN: 12
EXT LYMPH%: 45.9
EXT MONOCYTES%: 13.8
EXT PLATELETS: 71
EXT POTASSIUM: 4
EXT PROTEIN TOTAL: 6.9
EXT SEGS%: 31.6
EXT SIROLIMUS LVL: 4.9
EXT SODIUM: 141 MMOL/L
EXT WBC: 3
PHOSPHATIDYLETHANOL (PETH): NEGATIVE NG/ML

## 2024-03-14 ENCOUNTER — PATIENT MESSAGE (OUTPATIENT)
Dept: TRANSPLANT | Facility: CLINIC | Age: 34
End: 2024-03-14
Payer: MEDICARE

## 2024-03-14 ENCOUNTER — TELEPHONE (OUTPATIENT)
Dept: TRANSPLANT | Facility: CLINIC | Age: 34
End: 2024-03-14
Payer: MEDICARE

## 2024-03-14 NOTE — TELEPHONE ENCOUNTER
Labs reviewed via portal and LFT's are stable.  Patient recommended to hydrate and will repeat labs on 3/17/24 per MD order.        ----- Message from Laura Morgan MD sent at 3/14/2024  2:57 PM CDT -----  Labs reviewed. LFTs stable. Creatinine is elevated, so recommend hydration and repeating labs in 1 week to make sure Cr has returned to baseline. No additional recommendations at this time. Thank you.

## 2024-03-21 NOTE — TELEPHONE ENCOUNTER
"Patient being evaluated today for dental pain that started 2 days ago. She is unable to see a dentist. Patient last took 800mg Ibuprofen at 2100 and a topical tooth gel. BP (!) 145/100   Pulse 83   Temp 97.6  F (36.4  C) (Tympanic)   Resp 16   Ht 1.702 m (5' 7\")   Wt 93 kg (205 lb)   SpO2 98%   BMI 32.11 kg/m         Triage Assessment (Adult)       Row Name 03/21/24 0042          Triage Assessment    Airway WDL WDL        Respiratory WDL    Respiratory WDL WDL        Skin Circulation/Temperature WDL    Skin Circulation/Temperature WDL WDL        Cardiac WDL    Cardiac WDL WDL        Peripheral/Neurovascular WDL    Peripheral Neurovascular WDL WDL        Cognitive/Neuro/Behavioral WDL    Cognitive/Neuro/Behavioral WDL WDL                     " Staff message sent to pt's ABU staff about his upcoming Hepatology appt on 7/23 at 0800. Msg sent to pt via my [facility] reminding him of appt and to remind his ABU group that he will be late that day.

## 2024-04-03 ENCOUNTER — PATIENT MESSAGE (OUTPATIENT)
Dept: TRANSPLANT | Facility: CLINIC | Age: 34
End: 2024-04-03
Payer: MEDICARE

## 2024-04-04 ENCOUNTER — PATIENT MESSAGE (OUTPATIENT)
Dept: TRANSPLANT | Facility: CLINIC | Age: 34
End: 2024-04-04
Payer: MEDICARE

## 2024-04-26 NOTE — ASSESSMENT & PLAN NOTE
- Enema ordered 1/20 and 1/21 with BM.  - Encourage ambulation.  - decrease narcotics.  - d/c metamucil and miralax bid as makes patient nauseated.  - increase reglan qid.  - small bowel follow through with normal findings.   - patient reports last BM 2/19   -schedule bisacodyl  -KUB 2/21 with significant amounts of stool and gas. Pt declines miralax, metamucil, colace.   -encouraged suppository. If unsuccessful will attempt to give mag citrate through his SHANE tube.   No

## 2024-05-27 ENCOUNTER — PATIENT MESSAGE (OUTPATIENT)
Dept: TRANSPLANT | Facility: CLINIC | Age: 34
End: 2024-05-27
Payer: MEDICARE

## 2024-05-28 ENCOUNTER — TELEPHONE (OUTPATIENT)
Dept: TRANSPLANT | Facility: CLINIC | Age: 34
End: 2024-05-28
Payer: MEDICARE

## 2024-05-28 NOTE — TELEPHONE ENCOUNTER
"Returned call had to leave voicemail for call back    ----- Message from Haider Fong sent at 5/28/2024  3:12 PM CDT -----  Consult/Advisory    Name Of Caller: Shannon Lebron (Pt PCP)    Contact Preference?: [phone]    What is the nature of the call?: Calling to speak w/ Laurence about treatment. Inquiring about which antibiotic would be best for pt    Additional Notes: Stating the matter is urgent because pt is currently present in her hospital at the moment    "Thank you for all that you do for our patients"  "

## 2024-06-03 ENCOUNTER — PATIENT MESSAGE (OUTPATIENT)
Dept: TRANSPLANT | Facility: CLINIC | Age: 34
End: 2024-06-03
Payer: MEDICARE

## 2024-06-03 LAB
EXT ALBUMIN: 3.3
EXT ALKALINE PHOSPHATASE: 185
EXT ALT: 26
EXT AST: 53
EXT BASOPHIL%: 0.3
EXT BILIRUBIN TOTAL: 1.4
EXT BUN: 25
EXT CALCIUM: 9.4
EXT CHLORIDE: 104
EXT CO2: 27
EXT CREATININE: 1.69 MG/DL
EXT EGFR NO RACE VARIABLE: 54
EXT EOSINOPHIL%: 5.3
EXT GLUCOSE: 133
EXT HEMATOCRIT: 32.5
EXT HEMOGLOBIN: 11.1
EXT LYMPH%: 34.7
EXT MONOCYTES%: 9
EXT PLATELETS: 116
EXT POTASSIUM: 3.3
EXT PROTEIN TOTAL: 7.4
EXT SEGS%: 50.7
EXT SIROLIMUS LVL: 6.6
EXT SODIUM: 138 MMOL/L
EXT WBC: 3.5

## 2024-06-05 ENCOUNTER — TELEPHONE (OUTPATIENT)
Dept: TRANSPLANT | Facility: CLINIC | Age: 34
End: 2024-06-05

## 2024-06-05 ENCOUNTER — OFFICE VISIT (OUTPATIENT)
Dept: TRANSPLANT | Facility: CLINIC | Age: 34
End: 2024-06-05
Payer: MEDICARE

## 2024-06-05 VITALS
DIASTOLIC BLOOD PRESSURE: 71 MMHG | HEART RATE: 67 BPM | SYSTOLIC BLOOD PRESSURE: 140 MMHG | TEMPERATURE: 98 F | BODY MASS INDEX: 25.68 KG/M2 | WEIGHT: 159.81 LBS | RESPIRATION RATE: 18 BRPM | OXYGEN SATURATION: 98 % | HEIGHT: 66 IN

## 2024-06-05 DIAGNOSIS — K83.1 BILIARY STRICTURE OF TRANSPLANTED LIVER: ICD-10-CM

## 2024-06-05 DIAGNOSIS — Z94.4 STATUS POST LIVER TRANSPLANT: ICD-10-CM

## 2024-06-05 DIAGNOSIS — K83.1 CHOLESTASIS: ICD-10-CM

## 2024-06-05 DIAGNOSIS — Z79.60 LONG-TERM USE OF IMMUNOSUPPRESSANT MEDICATION: Chronic | ICD-10-CM

## 2024-06-05 DIAGNOSIS — T86.49 BILIARY STRICTURE OF TRANSPLANTED LIVER: ICD-10-CM

## 2024-06-05 DIAGNOSIS — F10.21 ALCOHOL USE DISORDER, SEVERE, IN SUSTAINED REMISSION: ICD-10-CM

## 2024-06-05 PROCEDURE — 99999 PR PBB SHADOW E&M-EST. PATIENT-LVL IV: CPT | Mod: PBBFAC,,, | Performed by: INTERNAL MEDICINE

## 2024-06-05 PROCEDURE — 3078F DIAST BP <80 MM HG: CPT | Mod: CPTII,S$GLB,, | Performed by: INTERNAL MEDICINE

## 2024-06-05 PROCEDURE — 99214 OFFICE O/P EST MOD 30 MIN: CPT | Mod: S$GLB,,, | Performed by: INTERNAL MEDICINE

## 2024-06-05 PROCEDURE — 3008F BODY MASS INDEX DOCD: CPT | Mod: CPTII,S$GLB,, | Performed by: INTERNAL MEDICINE

## 2024-06-05 PROCEDURE — 1160F RVW MEDS BY RX/DR IN RCRD: CPT | Mod: CPTII,S$GLB,, | Performed by: INTERNAL MEDICINE

## 2024-06-05 PROCEDURE — 3077F SYST BP >= 140 MM HG: CPT | Mod: CPTII,S$GLB,, | Performed by: INTERNAL MEDICINE

## 2024-06-05 PROCEDURE — 1159F MED LIST DOCD IN RCRD: CPT | Mod: CPTII,S$GLB,, | Performed by: INTERNAL MEDICINE

## 2024-06-05 RX ORDER — METHYLNALTREXONE BROMIDE 150 MG/1
TABLET ORAL
COMMUNITY

## 2024-06-05 RX ORDER — CEPHALEXIN 500 MG/1
500 CAPSULE ORAL 2 TIMES DAILY
COMMUNITY
Start: 2024-05-28

## 2024-06-05 RX ORDER — TIZANIDINE HYDROCHLORIDE 4 MG/1
1 CAPSULE, GELATIN COATED ORAL DAILY PRN
COMMUNITY

## 2024-06-05 NOTE — Clinical Note
Labs in 1 week. Please send CMV PCR and PETH. US abdomen to rule out ascites. Start ursodiol 300mg BID. No changes to immunosuppression.  RTC in 6 months.

## 2024-06-05 NOTE — LETTER
June 5, 2024                      [facility]  [address]  [facility] [address]  Phone: [phone]   Patient: Jhonny Diana   MR Number: [id number]   YOB: 1990   Date of Visit: 6/5/2024       Dear       Thank you for referring Jhonny Diana to me for evaluation. Attached you will find relevant portions of my assessment and plan of care.    If you have questions, please do not hesitate to call me. I look forward to following Jhonny Diana along with you.    Sincerely,    Laura Morgan MD    Enclosure    If you would like to receive this communication electronically, please contact [email or web address] or [phone] to request [employer] access.    [employer] is a tool which provides read-only access to select patient information with whom you have a relationship. Its easy to use and provides real time access to review your patients record including encounter summaries, notes, results, and demographic information.    If you feel you have received this communication in error or would no longer like to receive these types of communications, please e-mail [email or web address]

## 2024-06-05 NOTE — PROGRESS NOTES
Transplant Hepatology  Liver Transplant Recipient Follow Up    PCP: Jhonny Angulo MD    Transplant History  Transplant Date: 10/19/2017  UNOS Native Liver Dx: Acute Alcoholic Hepatitis    ORGAN: LIVER  Whole or Partial: whole liver  Donor Type: donation after brain death  CDC High Risk: no  Donor CMV Status: Positive  Donor HCV Status: Negative  Donor HBcAb: Negative  Donor HBV ANITA:   Donor HCV ANITA: Negative  Biliary Anastomosis: end to end  Arterial Anatomy: standard  IVC reconstruction: end to end ivc  Portal vein status: patent    He has had the following complications since transplant: LAURA previously on HD, anastomotic stricture, ETOH relapse (6/2019).     Chief complaint: follow up, history of OLT    HPI:  Jhonny Diana is a 33 y.o. male with history of OLT in 10/2017 for alcohol related cirrhosis / acute alcohol related hepatitis who presents for follow up.     Last seen in Transplant Hepatology Clinic on 1/5/2024.    Interval Event:  - Feels well. Notes feeling the best he has felt in a long time. Accompanied by mother, Elvira.  - Notes experiencing a mechanical fall a couple of weeks ago and hitting the left side of his abdomen/ribs, and legs. States left side of chest wall in tender.  - Notes new abdominal bloating. He wonders if there could be fluid in abdomen.  - Currently taking Keflex for possible cellulitis in the leg.  - OTC include fiber with inulin  MVI, vit D and K, and agmetine supplement for kidney health.   - Taking sirolimus 1mg BID. Reports adherence to IS.  - Takes torsemide as needed for LE swelling.    Past Medical History:   Diagnosis Date    Abdominal pain 5/27/2018    Alcoholic cirrhosis 8/14/2018    Alcoholic hepatitis with ascites     Anemia     CKD (chronic kidney disease) stage 3, GFR 30-59 ml/min 8/14/2018    Encounter for blood transfusion     multiple blood transfusions    ESRD on dialysis     History of hematemesis 9/28/2017    Hypertension     Seizures     Substance  abuse     alcohol and opioids    Thrombocytopenia     Transplanted liver     10/2017       Past Surgical History:   Procedure Laterality Date    APPENDECTOMY      CENTRAL VENOUS CATHETER TUNNELED INSERTION DOUBLE LUMEN      inserted and removed    ERCP      ERCP N/A 2018    Procedure: ERCP;  Surgeon: Solis Villanueva MD;  Location: [facility] ([address] [place] [address]);  Service: Endoscopy;  Laterality: N/A;  dialysis/cirrhosis/labs prior to ERCP/svn  platelet    ESOPHAGOGASTRODUODENOSCOPY      ESOPHAGOGASTRODUODENOSCOPY N/A 2019    Procedure: EGD (ESOPHAGOGASTRODUODENOSCOPY);  Surgeon: Sheri Siddiqui MD;  Location: [facility] ([address] [place] [address]);  Service: Endoscopy;  Laterality: N/A;    LIVER TRANSPLANT      10/2017       Family History   Problem Relation Name Age of Onset    No Known Problems Mother      Cancer Father          Lung cancer    Alcohol abuse Father      No Known Problems Sister 3     No Known Problems Brother 1     Kidney disease Neg Hx         Social History     Tobacco Use    Smoking status: Former     Current packs/day: 0.00     Average packs/day: 1 pack/day for 10.0 years (10.0 ttl pk-yrs)     Types: Cigarettes     Start date: 2007     Quit date: 2017     Years since quittin.2    Smokeless tobacco: Never   Substance Use Topics    Alcohol use: No     Comment: a fifth of liquor daily for years, cut back over last 2 months    Drug use: No       Current Outpatient Medications   Medication Sig Dispense Refill    cephALEXin (KEFLEX) 500 MG capsule Take 500 mg by mouth 2 (two) times daily.      diazePAM (VALIUM) 2 MG tablet Take 2 mg by mouth.      naloxone (NARCAN) 4 mg/actuation Spry CALL 911. SPR CONTENTS OF ONE SPRAYER (0.1ML) INTO ONE NOSTRIL. REPEAT IN 2-3 MIN IF SYMPTOMS OF OPIOID EMERGENCY PERSIST, ALTERNATE NOSTRILS      oxyCODONE (ROXICODONE) 15 MG Tab Take 10 mg by mouth 3 (three) times daily.      RELISTOR 150 mg Tab       sirolimus (RAPAMUNE) 1 MG Tab Take 1 tablet (1 mg total) by mouth once  "daily. 30 tablet 11    tiZANidine (ZANAFLEX) 4 MG tablet Take 4 mg by mouth every 6 (six) hours as needed.      tiZANidine 4 mg Cap Take 1 tablet by mouth daily as needed.      torsemide (DEMADEX) 10 MG Tab       valACYclovir (VALTREX) 500 MG tablet        No current facility-administered medications for this visit.       Review of patient's allergies indicates:   Allergen Reactions    Bactrim [sulfamethoxazole-trimethoprim] Other (See Comments)     Mookie Trell Syndrome            Vitals:    06/05/24 0942   BP: (!) 140/71   Pulse: 67   Resp: 18   Temp: 97.7 °F (36.5 °C)   TempSrc: Temporal   SpO2: 98%   Weight: 72.5 kg (159 lb 13.3 oz)   Height: 5' 6" (1.676 m)   Body mass index is 25.8 kg/m².    Physical Exam  Constitutional:       General: He is not in acute distress.  Eyes:      General: No scleral icterus.  Cardiovascular:      Rate and Rhythm: Normal rate.   Pulmonary:      Effort: No respiratory distress.   Chest:      Chest wall: Tenderness (Left lower chest wall / left flank.) present.   Abdominal:      General: Abdomen is flat. There is no distension.      Palpations: Abdomen is soft. There is no fluid wave.      Tenderness: There is generalized abdominal tenderness.          Comments: Well-healed surgical incision.   Musculoskeletal:         General: Swelling present.   Skin:     Coloration: Skin is not jaundiced.   Neurological:      General: No focal deficit present.      Mental Status: He is alert.   Psychiatric:         Thought Content: Thought content normal.         LABS:  Lab Results   Component Value Date    WBC 5.68 12/11/2019    HGB 8.8 (L) 12/11/2019    HCT 29.2 (L) 12/11/2019    MCV 89 12/11/2019     12/11/2019       Lab Results   Component Value Date     12/11/2019    K 4.1 12/11/2019     12/11/2019    CO2 21 (L) 12/11/2019    BUN 12 12/11/2019    CREATININE 1.9 (H) 12/11/2019    CALCIUM 8.4 (L) 12/11/2019    ANIONGAP 10 12/11/2019    ESTGFRAFRICA 53.5 (A) 12/11/2019    " EGFRNONAA 46.3 (A) 12/11/2019       Lab Results   Component Value Date    ALT 7 (L) 12/11/2019    AST 22 12/11/2019     (H) 10/26/2017    ALKPHOS 139 (H) 12/11/2019    BILITOT 0.3 12/11/2019       Lab Results   Component Value Date    TACROLIMUS <1.5 (L) 03/04/2018         Assessment:   Jhonny Diana is a 33 y.o. male with history of OLT in 10/2017 for alcohol related cirrhosis / acute alcohol related hepatitis who presents for follow up.    1. Cholestasis    2. Status post liver transplant    3. Long-term use of immunosuppressant medication    4. Biliary stricture of transplanted liver    5. Alcohol use disorder, severe, in sustained remission        Recommendations:  Allograft Function  - Excellent graft function. Mild elevation of AST. Stable elevation of ALP.  - US liver transplant unremarkable (1/5/23)  - MRCP without biliary stricture or biliary dilation (3/1/2024)  Plan:  - Check CMV PCR  - Check PETH   - Plan for repeat US abdomen to rule out ascites (pt notes new bloating)   - Start UDCA 300mg BID given hx biliary stricture, elev ALP    Immunosuppression   - Continue sirolimus 1mg daily    Kidney function   - Stable creatinine 1.69 on 5/25/24  - Avoid NSAIDs as able and maintain adequate hydration    Health Maintenance/Screening:  - Recommend age appropriate cancer screening  - Skin cancer: Recommend use of sunscreen SPF 30 or higher, hat and sunglasses while outside, dermatologist visit annually or sooner if any concerning lesions.  - Osteoporosis: Recommend bone density testing every 2-3 years if previously normal or annually if previously abnormal.    Return to clinic in 6 months.    UNOS Patient Status  Functional Status: 70% - Cares for self: unable to carry on normal activity or active work  Physical Capacity: Limited Mobility    I spent a total of 30 minutes on the day of the visit. This includes face to face time and non-face to face time preparing to see the patient (eg, review of  tests), obtaining and/or reviewing separately obtained history, documenting clinical information in the electronic or other health record, independently interpreting results, and communicating results to the patient/family/caregiver, or care coordination.    Laura Morgan MD  Staff Physician  Hepatology and Liver Transplant  [facility]  [facility] [place]

## 2024-06-05 NOTE — TELEPHONE ENCOUNTER
Labs reviewed via portal and are stable.  Patient will repeat labs on 9/3/24.        ----- Message from Laura Morgan MD sent at 6/3/2024  6:37 PM CDT -----  Labs reviewed. No changes at this time.

## 2024-06-06 ENCOUNTER — PATIENT MESSAGE (OUTPATIENT)
Dept: TRANSPLANT | Facility: CLINIC | Age: 34
End: 2024-06-06
Payer: MEDICARE

## 2024-06-07 DIAGNOSIS — R74.8 ABNORMAL LIVER ENZYMES: ICD-10-CM

## 2024-06-07 DIAGNOSIS — Z94.4 STATUS POST LIVER TRANSPLANT: ICD-10-CM

## 2024-06-07 DIAGNOSIS — K83.1 CHOLESTASIS: Primary | ICD-10-CM

## 2024-06-07 RX ORDER — URSODIOL 300 MG/1
300 CAPSULE ORAL 2 TIMES DAILY
Qty: 60 CAPSULE | Refills: 11 | Status: SHIPPED | OUTPATIENT
Start: 2024-06-07 | End: 2025-06-07

## 2024-06-07 NOTE — LETTER
"STANDING LAB ORDERS    2024 - 24      ORDERING MD:   Laura Morgan MD - NPI #[id number]         Patient Name: Jhonny Diana               : 1990    [facility] Number: [id number]                  #:        Please draw the following labs:     Test Frequency  Diagnosis/ICD-10 Code     CBC/DIFF/PLT every month  Z94.4 Liver Transplant       Complete Metabolic Panel every month  Z94.4 Liver Transplant         Sirolimus (Rapamune) level every month  Z94.4 Liver Transplant     CMV PCR Once   Z94.4, Z11.59       PEth Once  Z94.4, K70.30          FAX RESULTS [phone] "[facility] Liver Transplant Coordinator", and send the hard copy when completed. If you have any questions regarding this request or need additional information, please call [phone].        "

## 2024-06-07 NOTE — TELEPHONE ENCOUNTER
US, ursodiol and labs ordered.   ----- Message from Laura Morgan MD sent at 6/5/2024 10:45 AM CDT -----  Labs in 1 week. Please send CMV PCR and PETH.  US abdomen to rule out ascites.  Start ursodiol 300mg BID.  No changes to immunosuppression.   RTC in 6 months.

## 2024-06-14 ENCOUNTER — PATIENT MESSAGE (OUTPATIENT)
Dept: TRANSPLANT | Facility: CLINIC | Age: 34
End: 2024-06-14
Payer: MEDICARE

## 2024-07-12 LAB
EXT ABO: ABNORMAL
EXT AFP: ABNORMAL
EXT ALBUMIN: 3.5
EXT ALCOHOL, MEDICAL, SERUM: ABNORMAL
EXT ALKALINE PHOSPHATASE: 180
EXT ALT: 273
EXT AMMONIA LEVEL: ABNORMAL
EXT AMYLASE: ABNORMAL
EXT AST: 319
EXT BACTERIA UA: ABNORMAL
EXT BASOPHIL%: 0.6
EXT BILIRUBIN DIRECT: ABNORMAL
EXT BILIRUBIN TOTAL: 1.7
EXT BK VIRUS DNA QN PCR: ABNORMAL
EXT BK VIRUS DNA QUANT, PCR, URINE: ABNORMAL
EXT BLOOD (STOOL): ABNORMAL
EXT BLOOD CULTURE, ROUTINE: ABNORMAL
EXT BLOOD CULTURE: ABNORMAL
EXT BLOOD FUNGUS: ABNORMAL
EXT BLOOD WITH ANAEROBE: ABNORMAL
EXT BUN: 23
EXT CALCIUM: 8.6
EXT CDIFF (STOOL): ABNORMAL
EXT CHLORIDE: 111
EXT CHOLESTEROL: ABNORMAL
EXT CMV ANTIGENEMIA: ABNORMAL
EXT CMV DNA QUANT. BY PCR: ABNORMAL
EXT CO2: 19
EXT CREATININE UA: ABNORMAL
EXT CREATININE: 1.82 MG/DL
EXT CULTURE (STOOL): ABNORMAL
EXT CULTURE, BODY FLUID - BACTEC: ABNORMAL
EXT CYCLOSPORONE LEVEL: ABNORMAL
EXT DRUGS OF ABUSE CONFIRMATION: ABNORMAL
EXT EBV DNA BY PCR: ABNORMAL
EXT EBV DNA-COPIES/ML: ABNORMAL
EXT EBV IGG: ABNORMAL
EXT EBV IGM: ABNORMAL
EXT EGFR NO RACE VARIABLE: 49
EXT EOSINOPHIL%: 7.8
EXT ERYTHROPOIETIN: ABNORMAL
EXT FERRITIN: ABNORMAL
EXT FOLATE: ABNORMAL
EXT FUNGAL (STOOL): ABNORMAL
EXT G6PD QUAL: ABNORMAL
EXT GFR MDRD AF AMER: ABNORMAL
EXT GFR MDRD NON AF AMER: ABNORMAL
EXT GGT: ABNORMAL
EXT GLUCOSE: 107
EXT HBV DNA QUANT PCR: ABNORMAL
EXT HBV DNA, QUALITATIVE PCR: ABNORMAL
EXT HCV QUANT: ABNORMAL
EXT HDL: ABNORMAL
EXT HEMATOCRIT: 36.2
EXT HEMOGLOBIN A1C: ABNORMAL
EXT HEMOGLOBIN: 12.1
EXT HEP B S AB: ABNORMAL
EXT HEP B S AG: ABNORMAL
EXT HIV RNA QUANT PCR: ABNORMAL
EXT HIV: ABNORMAL
EXT IMMUNKNOW (NON-STIMULATED): ABNORMAL
EXT IMMUNKNOW (STIMULATED): ABNORMAL
EXT INR: ABNORMAL
EXT IRON SATURATION: ABNORMAL
EXT LDL CHOLESTEROL: ABNORMAL
EXT LIPASE: ABNORMAL
EXT LYMPH%: 40.6
EXT MAGNESIUM: ABNORMAL
EXT MONOCYTES%: 12.6
EXT O&P (STOOL): ABNORMAL
EXT PHOSPHORUS: ABNORMAL
EXT PLATELETS: 71
EXT POTASSIUM: 3.8
EXT PREALBUMIN: ABNORMAL
EXT PROT/CREAT RATIO UR: ABNORMAL
EXT PROTEIN TOTAL: 7.1
EXT PROTEIN UA: ABNORMAL
EXT PT: ABNORMAL
EXT PTH, INTACT: ABNORMAL
EXT PTT: ABNORMAL
EXT RBC UA: ABNORMAL
EXT RETICULOCYTE COUNT: ABNORMAL
EXT SARS COV-2 (COVID-19): ABNORMAL
EXT SEGS%: 38.5
EXT SIROLIMUS LVL: ABNORMAL
EXT SODIUM: 141 MMOL/L
EXT TACROLIMUS LVL: ABNORMAL
EXT TIBC: ABNORMAL
EXT TRIGLYCERIDES: ABNORMAL
EXT UNSATURATED IRON BINDING CAP.: ABNORMAL
EXT URIC ACID: ABNORMAL
EXT VIT D 1 25 DIHYDROXY: ABNORMAL
EXT VIT D 25 HYDROXY: ABNORMAL
EXT WBC (STOOL): ABNORMAL
EXT WBC UA: ABNORMAL
EXT WBC: 3.2

## 2024-07-14 ENCOUNTER — TELEPHONE (OUTPATIENT)
Dept: TRANSPLANT | Facility: CLINIC | Age: 34
End: 2024-07-14
Payer: MEDICARE

## 2024-07-14 DIAGNOSIS — R79.89 ELEVATED LFTS: ICD-10-CM

## 2024-07-14 DIAGNOSIS — Z94.4 STATUS POST LIVER TRANSPLANT: Primary | ICD-10-CM

## 2024-07-15 NOTE — TELEPHONE ENCOUNTER
Referral entered for biopsy.  Message sent  to patient via portal to coordinate liver ultrasound in pt's local residential area.      ----- Message from Laura Morgan MD sent at 7/13/2024  3:59 PM CDT -----  Labs reviewed. Spoke with patient over the phone on 7/13/23 and offered admission for expedited evaluation. Patient would like to stay home instead. Therefore, will plan to repeat labs and US doppler on 7/15/24 - 7/16/24, and arrange for liver biopsy. Thanks.

## 2024-07-16 ENCOUNTER — TELEPHONE (OUTPATIENT)
Dept: TRANSPLANT | Facility: CLINIC | Age: 34
End: 2024-07-16
Payer: MEDICARE

## 2024-07-16 DIAGNOSIS — R79.89 ELEVATED LFTS: ICD-10-CM

## 2024-07-16 DIAGNOSIS — Z94.4 STATUS POST LIVER TRANSPLANT: Primary | ICD-10-CM

## 2024-07-16 RX ORDER — PREDNISONE 10 MG/1
40 TABLET ORAL DAILY
Qty: 120 TABLET | Refills: 1 | Status: SHIPPED | OUTPATIENT
Start: 2024-07-16

## 2024-07-16 NOTE — TELEPHONE ENCOUNTER
Spoke with pt regarding need for labs ASAP.  Pt states he will get labs tomorrow.  Informed of initiation of steroid therapy for Pred 40 daily.  Pt verbalized understanding and states will comply.    ----- Message from Radha Palacios MD sent at 7/16/2024  1:15 PM CDT -----  No labs since 7/9. Add pred 40 mg daily. Pt to do labs asap- please let patient know.

## 2024-07-16 NOTE — TELEPHONE ENCOUNTER
Just spoke with patient who states he will get labs tomorrow 7/17/24.  Pt states his PCP will set up his liver ultrasound.    ----- Message from Radha Palacios MD sent at 7/15/2024 10:05 PM CDT -----  Dr Morgan wanted labs done today. Please schedule asap- please let patient know.

## 2024-07-17 ENCOUNTER — TELEPHONE (OUTPATIENT)
Dept: INTERVENTIONAL RADIOLOGY/VASCULAR | Facility: CLINIC | Age: 34
End: 2024-07-17
Payer: MEDICARE

## 2024-07-17 NOTE — TELEPHONE ENCOUNTER
Spoke to pt on phone,  Pt is scheduled on 8/5/2024 with arrival time of 11am for IR procedure with anes at  location.  Preop instructions given (NPO after midnight, MUST have a ride home, Nurse will call 1-2  days before to go over instructions and medications) pt verbally understood. Pt aware and confirmed, Thanks

## 2024-07-18 LAB
EXT ABO: NORMAL
EXT AFP: NORMAL
EXT ALBUMIN: 3.5
EXT ALCOHOL, MEDICAL, SERUM: NORMAL
EXT ALKALINE PHOSPHATASE: 161
EXT ALT: 180
EXT AMMONIA LEVEL: NORMAL
EXT AMYLASE: NORMAL
EXT AST: 164
EXT BACTERIA UA: NORMAL
EXT BASOPHIL%: NORMAL
EXT BILIRUBIN DIRECT: NORMAL
EXT BILIRUBIN TOTAL: 2.5
EXT BK VIRUS DNA QN PCR: NORMAL
EXT BK VIRUS DNA QUANT, PCR, URINE: NORMAL
EXT BLOOD (STOOL): NORMAL
EXT BLOOD CULTURE, ROUTINE: NORMAL
EXT BLOOD CULTURE: NORMAL
EXT BLOOD FUNGUS: NORMAL
EXT BLOOD WITH ANAEROBE: NORMAL
EXT BUN: 25
EXT CALCIUM: 8.8
EXT CDIFF (STOOL): NORMAL
EXT CHLORIDE: 108
EXT CHOLESTEROL: NORMAL
EXT CMV ANTIGENEMIA: NORMAL
EXT CMV DNA QUANT. BY PCR: NORMAL
EXT CO2: 18
EXT CREATININE UA: NORMAL
EXT CREATININE: 1.51 MG/DL
EXT CULTURE (STOOL): NORMAL
EXT CULTURE, BODY FLUID - BACTEC: NORMAL
EXT CYCLOSPORONE LEVEL: NORMAL
EXT DRUGS OF ABUSE CONFIRMATION: NORMAL
EXT EBV DNA BY PCR: NORMAL
EXT EBV DNA-COPIES/ML: NORMAL
EXT EBV IGG: NORMAL
EXT EBV IGM: NORMAL
EXT EGFR NO RACE VARIABLE: 62
EXT EOSINOPHIL%: 2
EXT ERYTHROPOIETIN: NORMAL
EXT FERRITIN: NORMAL
EXT FOLATE: NORMAL
EXT FUNGAL (STOOL): NORMAL
EXT G6PD QUAL: NORMAL
EXT GFR MDRD AF AMER: NORMAL
EXT GFR MDRD NON AF AMER: NORMAL
EXT GGT: NORMAL
EXT GLUCOSE: 126
EXT HBV DNA QUANT PCR: NORMAL
EXT HBV DNA, QUALITATIVE PCR: NORMAL
EXT HCV QUANT: NORMAL
EXT HDL: NORMAL
EXT HEMATOCRIT: 37.9
EXT HEMOGLOBIN A1C: NORMAL
EXT HEMOGLOBIN: 12.9
EXT HEP B S AB: NORMAL
EXT HEP B S AG: NORMAL
EXT HIV RNA QUANT PCR: NORMAL
EXT HIV: NORMAL
EXT IMMUNKNOW (NON-STIMULATED): NORMAL
EXT IMMUNKNOW (STIMULATED): NORMAL
EXT INR: NORMAL
EXT IRON SATURATION: NORMAL
EXT LDL CHOLESTEROL: NORMAL
EXT LIPASE: NORMAL
EXT LYMPH%: 26
EXT MAGNESIUM: NORMAL
EXT MONOCYTES%: 4
EXT O&P (STOOL): NORMAL
EXT PHOSPHORUS: NORMAL
EXT PLATELETS: 99
EXT POTASSIUM: 3.7
EXT PREALBUMIN: NORMAL
EXT PROT/CREAT RATIO UR: NORMAL
EXT PROTEIN TOTAL: 7
EXT PROTEIN UA: NORMAL
EXT PT: NORMAL
EXT PTH, INTACT: NORMAL
EXT PTT: NORMAL
EXT RBC UA: NORMAL
EXT RETICULOCYTE COUNT: NORMAL
EXT SARS COV-2 (COVID-19): NORMAL
EXT SEGS%: 62
EXT SIROLIMUS LVL: NORMAL
EXT SODIUM: 135 MMOL/L
EXT TACROLIMUS LVL: NORMAL
EXT TIBC: NORMAL
EXT TRIGLYCERIDES: NORMAL
EXT UNSATURATED IRON BINDING CAP.: NORMAL
EXT URIC ACID: NORMAL
EXT VIT D 1 25 DIHYDROXY: NORMAL
EXT VIT D 25 HYDROXY: NORMAL
EXT WBC (STOOL): NORMAL
EXT WBC UA: NORMAL
EXT WBC: 5.2

## 2024-07-24 ENCOUNTER — PATIENT MESSAGE (OUTPATIENT)
Dept: TRANSPLANT | Facility: CLINIC | Age: 34
End: 2024-07-24
Payer: MEDICARE

## 2024-07-25 ENCOUNTER — TELEPHONE (OUTPATIENT)
Dept: TRANSPLANT | Facility: CLINIC | Age: 34
End: 2024-07-25
Payer: MEDICARE

## 2024-07-25 NOTE — TELEPHONE ENCOUNTER
Labs reviewed via portal and are stable.  Patient will repeat labs on 7/29/24. Prednisone 40 mg began on 7/17/24.      ----- Message from Laura Morgan MD sent at 7/25/2024  4:03 PM CDT -----  Enzymes improved. Do you know if these labs were drawn before or after he started prednisone? I assume after, but just checking.

## 2024-07-26 ENCOUNTER — TELEPHONE (OUTPATIENT)
Dept: TRANSPLANT | Facility: CLINIC | Age: 34
End: 2024-07-26
Payer: MEDICARE

## 2024-07-26 NOTE — TELEPHONE ENCOUNTER
----- Message from Barbara Dyson MA sent at 7/26/2024  2:49 PM CDT -----  Regarding: FW: US orders  Contact: Anne    ----- Message -----  From: Soniya Chow  Sent: 7/26/2024   1:31 PM CDT  To: [facility] Transplant Non-Clinical  Subject: US orders                                        Consult/Advisory     Name Of Caller:Anne        Contact Preference:[phone]     Nature of call:calling from [facility] in [place], [place], in regards to an order for the pt that was dropped off, for a liver TXP US with doppler. Please advise. Requesting a call back.

## 2024-07-27 ENCOUNTER — HOSPITAL ENCOUNTER (EMERGENCY)
Facility: HOSPITAL | Age: 34
Discharge: HOME | End: 2024-07-27
Attending: EMERGENCY MEDICINE
Payer: MEDICARE

## 2024-07-27 VITALS
DIASTOLIC BLOOD PRESSURE: 74 MMHG | TEMPERATURE: 98 F | WEIGHT: 155 LBS | RESPIRATION RATE: 20 BRPM | OXYGEN SATURATION: 97 % | HEART RATE: 72 BPM | BODY MASS INDEX: 24.91 KG/M2 | HEIGHT: 66 IN | SYSTOLIC BLOOD PRESSURE: 141 MMHG

## 2024-07-27 DIAGNOSIS — R55 SYNCOPE, UNSPECIFIED SYNCOPE TYPE: ICD-10-CM

## 2024-07-27 DIAGNOSIS — N50.812 TESTICULAR PAIN, LEFT: ICD-10-CM

## 2024-07-27 DIAGNOSIS — R07.9 CHEST PAIN: ICD-10-CM

## 2024-07-27 DIAGNOSIS — R93.5 ABNORMAL CT OF THE ABDOMEN: ICD-10-CM

## 2024-07-27 DIAGNOSIS — J90 PLEURAL EFFUSION: ICD-10-CM

## 2024-07-27 DIAGNOSIS — K40.90 NON-RECURRENT UNILATERAL INGUINAL HERNIA WITHOUT OBSTRUCTION OR GANGRENE: ICD-10-CM

## 2024-07-27 DIAGNOSIS — R60.9 EDEMA, UNSPECIFIED TYPE: ICD-10-CM

## 2024-07-27 DIAGNOSIS — N43.3 HYDROCELE IN ADULT: ICD-10-CM

## 2024-07-27 DIAGNOSIS — R10.9 ABDOMINAL PAIN, UNSPECIFIED ABDOMINAL LOCATION: Primary | ICD-10-CM

## 2024-07-27 LAB
ALBUMIN SERPL BCP-MCNC: 3.1 G/DL (ref 3.5–5.2)
ALP SERPL-CCNC: 149 U/L (ref 55–135)
ALT SERPL W/O P-5'-P-CCNC: 83 U/L (ref 10–44)
ANION GAP SERPL CALC-SCNC: 10 MMOL/L (ref 8–16)
AST SERPL-CCNC: 58 U/L (ref 10–40)
BASOPHILS # BLD AUTO: 0.02 K/UL (ref 0–0.2)
BASOPHILS NFR BLD: 0.4 % (ref 0–1.9)
BILIRUB SERPL-MCNC: 2 MG/DL (ref 0.1–1)
BILIRUB UR QL STRIP: NEGATIVE
BNP SERPL-MCNC: 76 PG/ML (ref 0–99)
BUN SERPL-MCNC: 23 MG/DL (ref 6–20)
CALCIUM SERPL-MCNC: 9.2 MG/DL (ref 8.7–10.5)
CHLORIDE SERPL-SCNC: 109 MMOL/L (ref 95–110)
CLARITY UR REFRACT.AUTO: CLEAR
CO2 SERPL-SCNC: 21 MMOL/L (ref 23–29)
COLOR UR AUTO: COLORLESS
CREAT SERPL-MCNC: 1.3 MG/DL (ref 0.5–1.4)
DIFFERENTIAL METHOD BLD: ABNORMAL
EOSINOPHIL # BLD AUTO: 0.1 K/UL (ref 0–0.5)
EOSINOPHIL NFR BLD: 2.3 % (ref 0–8)
ERYTHROCYTE [DISTWIDTH] IN BLOOD BY AUTOMATED COUNT: 14.1 % (ref 11.5–14.5)
EST. GFR  (NO RACE VARIABLE): >60 ML/MIN/1.73 M^2
GLUCOSE SERPL-MCNC: 82 MG/DL (ref 70–110)
GLUCOSE UR QL STRIP: NEGATIVE
HCT VFR BLD AUTO: 40.4 % (ref 40–54)
HCV AB SERPL QL IA: NORMAL
HGB BLD-MCNC: 13.3 G/DL (ref 14–18)
HGB UR QL STRIP: ABNORMAL
HIV 1+2 AB+HIV1 P24 AG SERPL QL IA: NORMAL
IMM GRANULOCYTES # BLD AUTO: 0.02 K/UL (ref 0–0.04)
IMM GRANULOCYTES NFR BLD AUTO: 0.4 % (ref 0–0.5)
KETONES UR QL STRIP: NEGATIVE
LEUKOCYTE ESTERASE UR QL STRIP: NEGATIVE
LIPASE SERPL-CCNC: 66 U/L (ref 4–60)
LYMPHOCYTES # BLD AUTO: 1.7 K/UL (ref 1–4.8)
LYMPHOCYTES NFR BLD: 31.5 % (ref 18–48)
MAGNESIUM SERPL-MCNC: 1.7 MG/DL (ref 1.6–2.6)
MCH RBC QN AUTO: 33.9 PG (ref 27–31)
MCHC RBC AUTO-ENTMCNC: 32.9 G/DL (ref 32–36)
MCV RBC AUTO: 103 FL (ref 82–98)
MONOCYTES # BLD AUTO: 0.7 K/UL (ref 0.3–1)
MONOCYTES NFR BLD: 13.4 % (ref 4–15)
NEUTROPHILS # BLD AUTO: 2.7 K/UL (ref 1.8–7.7)
NEUTROPHILS NFR BLD: 52 % (ref 38–73)
NITRITE UR QL STRIP: NEGATIVE
NRBC BLD-RTO: 0 /100 WBC
PH UR STRIP: 6 [PH] (ref 5–8)
PLATELET # BLD AUTO: 59 K/UL (ref 150–450)
PMV BLD AUTO: 11.5 FL (ref 9.2–12.9)
POTASSIUM SERPL-SCNC: 4.1 MMOL/L (ref 3.5–5.1)
PROT SERPL-MCNC: 7.1 G/DL (ref 6–8.4)
PROT UR QL STRIP: NEGATIVE
RBC # BLD AUTO: 3.92 M/UL (ref 4.6–6.2)
SODIUM SERPL-SCNC: 140 MMOL/L (ref 136–145)
SP GR UR STRIP: 1 (ref 1–1.03)
TROPONIN I SERPL DL<=0.01 NG/ML-MCNC: <0.006 NG/ML (ref 0–0.03)
URN SPEC COLLECT METH UR: ABNORMAL
WBC # BLD AUTO: 5.24 K/UL (ref 3.9–12.7)

## 2024-07-27 PROCEDURE — 87491 CHLMYD TRACH DNA AMP PROBE: CPT | Performed by: PHYSICIAN ASSISTANT

## 2024-07-27 PROCEDURE — 80053 COMPREHEN METABOLIC PANEL: CPT | Performed by: PHYSICIAN ASSISTANT

## 2024-07-27 PROCEDURE — 84484 ASSAY OF TROPONIN QUANT: CPT | Performed by: PHYSICIAN ASSISTANT

## 2024-07-27 PROCEDURE — 99285 EMERGENCY DEPT VISIT HI MDM: CPT | Mod: 25

## 2024-07-27 PROCEDURE — 93005 ELECTROCARDIOGRAM TRACING: CPT

## 2024-07-27 PROCEDURE — 81003 URINALYSIS AUTO W/O SCOPE: CPT | Performed by: PHYSICIAN ASSISTANT

## 2024-07-27 PROCEDURE — 87591 N.GONORRHOEAE DNA AMP PROB: CPT | Performed by: PHYSICIAN ASSISTANT

## 2024-07-27 PROCEDURE — 25500020 PHARM REV CODE 255: Performed by: EMERGENCY MEDICINE

## 2024-07-27 PROCEDURE — 83690 ASSAY OF LIPASE: CPT | Performed by: PHYSICIAN ASSISTANT

## 2024-07-27 PROCEDURE — 83735 ASSAY OF MAGNESIUM: CPT | Performed by: PHYSICIAN ASSISTANT

## 2024-07-27 PROCEDURE — 85025 COMPLETE CBC W/AUTO DIFF WBC: CPT | Performed by: PHYSICIAN ASSISTANT

## 2024-07-27 PROCEDURE — 25000003 PHARM REV CODE 250: Performed by: PHYSICIAN ASSISTANT

## 2024-07-27 PROCEDURE — 87661 TRICHOMONAS VAGINALIS AMPLIF: CPT | Performed by: PHYSICIAN ASSISTANT

## 2024-07-27 PROCEDURE — 93010 ELECTROCARDIOGRAM REPORT: CPT | Mod: ,,, | Performed by: INTERNAL MEDICINE

## 2024-07-27 PROCEDURE — 87389 HIV-1 AG W/HIV-1&-2 AB AG IA: CPT | Performed by: PHYSICIAN ASSISTANT

## 2024-07-27 PROCEDURE — 86803 HEPATITIS C AB TEST: CPT | Performed by: PHYSICIAN ASSISTANT

## 2024-07-27 PROCEDURE — 83880 ASSAY OF NATRIURETIC PEPTIDE: CPT | Performed by: PHYSICIAN ASSISTANT

## 2024-07-27 RX ORDER — LORAZEPAM 1 MG/1
1 TABLET ORAL
Status: COMPLETED | OUTPATIENT
Start: 2024-07-27 | End: 2024-07-27

## 2024-07-27 RX ORDER — OXYCODONE HYDROCHLORIDE 5 MG/1
5 TABLET ORAL
Status: COMPLETED | OUTPATIENT
Start: 2024-07-27 | End: 2024-07-27

## 2024-07-27 RX ORDER — TORSEMIDE 10 MG/1
10 TABLET ORAL DAILY
Status: DISCONTINUED | OUTPATIENT
Start: 2024-07-27 | End: 2024-07-27 | Stop reason: HOSPADM

## 2024-07-27 RX ADMIN — TORSEMIDE 10 MG: 10 TABLET ORAL at 01:07

## 2024-07-27 RX ADMIN — OXYCODONE 5 MG: 5 TABLET ORAL at 09:07

## 2024-07-27 RX ADMIN — LORAZEPAM 1 MG: 1 TABLET ORAL at 12:07

## 2024-07-27 RX ADMIN — IOHEXOL 75 ML: 350 INJECTION, SOLUTION INTRAVENOUS at 09:07

## 2024-07-28 LAB
OHS QRS DURATION: 96 MS
OHS QTC CALCULATION: 404 MS

## 2024-07-29 ENCOUNTER — PATIENT MESSAGE (OUTPATIENT)
Dept: TRANSPLANT | Facility: CLINIC | Age: 34
End: 2024-07-29
Payer: MEDICARE

## 2024-07-29 ENCOUNTER — TELEPHONE (OUTPATIENT)
Dept: SURGERY | Facility: CLINIC | Age: 34
End: 2024-07-29
Payer: MEDICARE

## 2024-07-29 LAB
C TRACH DNA SPEC QL NAA+PROBE: NOT DETECTED
N GONORRHOEA DNA SPEC QL NAA+PROBE: NOT DETECTED

## 2024-07-30 ENCOUNTER — PATIENT MESSAGE (OUTPATIENT)
Dept: TRANSPLANT | Facility: CLINIC | Age: 34
End: 2024-07-30
Payer: MEDICARE

## 2024-07-30 ENCOUNTER — PATIENT MESSAGE (OUTPATIENT)
Dept: INTERVENTIONAL RADIOLOGY/VASCULAR | Facility: HOSPITAL | Age: 34
End: 2024-07-30
Payer: MEDICARE

## 2024-08-01 ENCOUNTER — DOCUMENTATION ONLY (OUTPATIENT)
Dept: PREADMISSION TESTING | Facility: HOSPITAL | Age: 34
End: 2024-08-01
Payer: MEDICARE

## 2024-08-01 NOTE — PRE-PROCEDURE INSTRUCTIONS
PRE-OP INSTRUCTIONS:  Instructed patient to have no food,milk or milk products after midnight 8/4/2024  It is ok to take AM medications with a few sips of water   Medication instructions for pm prior to and am of surgery reviewed.  Instructed patient to avoid taking vitamins,supplements,aspirin or ibuprofen the am of surgery.  Shower instructions provided    S/P LIVER TRANSPLANT    PATIENT REPORTED THAT HE HAS WOKEN UP AT THE END OF PREVIOUS PROCEDURES IN INTENSE PAIN AND IS HOPING THAT WON'T HAPPEN AGAIN

## 2024-08-02 ENCOUNTER — TELEPHONE (OUTPATIENT)
Dept: TRANSPLANT | Facility: CLINIC | Age: 34
End: 2024-08-02
Payer: MEDICARE

## 2024-08-02 ENCOUNTER — PATIENT MESSAGE (OUTPATIENT)
Dept: TRANSPLANT | Facility: CLINIC | Age: 34
End: 2024-08-02
Payer: MEDICARE

## 2024-08-02 NOTE — TELEPHONE ENCOUNTER
Spoke with patient via telephone and coordinated Lab orders,  for tests needed prior to biopsy 8/5/24, have been faxed to [facility] lab.  Pt states he will get labs Saturday, 8/3/24.  Tests ordered:  CBC, CMP, Sirolimus and PT/INR.

## 2024-08-05 ENCOUNTER — ANESTHESIA EVENT (OUTPATIENT)
Dept: INTERVENTIONAL RADIOLOGY/VASCULAR | Facility: HOSPITAL | Age: 34
End: 2024-08-05
Payer: MEDICARE

## 2024-08-05 ENCOUNTER — HOSPITAL ENCOUNTER (OUTPATIENT)
Dept: INTERVENTIONAL RADIOLOGY/VASCULAR | Facility: HOSPITAL | Age: 34
Discharge: HOME | End: 2024-08-05
Attending: INTERNAL MEDICINE
Payer: MEDICARE

## 2024-08-05 ENCOUNTER — OFFICE VISIT (OUTPATIENT)
Dept: SURGERY | Facility: CLINIC | Age: 34
End: 2024-08-05
Payer: MEDICARE

## 2024-08-05 VITALS
RESPIRATION RATE: 20 BRPM | TEMPERATURE: 98 F | BODY MASS INDEX: 24.11 KG/M2 | OXYGEN SATURATION: 98 % | DIASTOLIC BLOOD PRESSURE: 76 MMHG | SYSTOLIC BLOOD PRESSURE: 134 MMHG | HEART RATE: 52 BPM | WEIGHT: 150 LBS | HEIGHT: 66 IN

## 2024-08-05 VITALS
WEIGHT: 155.13 LBS | SYSTOLIC BLOOD PRESSURE: 126 MMHG | HEIGHT: 66 IN | BODY MASS INDEX: 24.93 KG/M2 | HEART RATE: 57 BPM | DIASTOLIC BLOOD PRESSURE: 77 MMHG

## 2024-08-05 DIAGNOSIS — D84.9 IMMUNOSUPPRESSION: ICD-10-CM

## 2024-08-05 DIAGNOSIS — D69.6 THROMBOCYTOPENIA: ICD-10-CM

## 2024-08-05 DIAGNOSIS — K40.20 NON-RECURRENT BILATERAL INGUINAL HERNIA WITHOUT OBSTRUCTION OR GANGRENE: Primary | ICD-10-CM

## 2024-08-05 DIAGNOSIS — R79.89 ELEVATED LFTS: ICD-10-CM

## 2024-08-05 DIAGNOSIS — Z94.4 STATUS POST LIVER TRANSPLANT: ICD-10-CM

## 2024-08-05 LAB
EXT ALBUMIN: 3.9
EXT ALKALINE PHOSPHATASE: 126
EXT ALT: 66
EXT AST: 43
EXT BASOPHIL%: 0.4
EXT BILIRUBIN TOTAL: 1.8
EXT BUN: 24
EXT CALCIUM: 9.2
EXT CHLORIDE: 108
EXT CO2: 23
EXT CREATININE: 1.49 MG/DL
EXT EGFR NO RACE VARIABLE: 62
EXT EOSINOPHIL%: 2.7
EXT GLUCOSE: 86
EXT HEMATOCRIT: 42
EXT HEMOGLOBIN: 15
EXT INR: 1.1
EXT LYMPH%: 33.7
EXT MONOCYTES%: 8.2
EXT PLATELETS: 57
EXT POTASSIUM: 3.8
EXT PROTEIN TOTAL: 7.5
EXT PT: 12.7
EXT SEGS%: 55
EXT SIROLIMUS LVL: NORMAL
EXT SODIUM: 140 MMOL/L
EXT WBC: 6

## 2024-08-05 PROCEDURE — 3008F BODY MASS INDEX DOCD: CPT | Mod: CPTII,S$GLB,, | Performed by: SURGERY

## 2024-08-05 PROCEDURE — 47000 NEEDLE BIOPSY OF LIVER PERQ: CPT | Performed by: RADIOLOGY

## 2024-08-05 PROCEDURE — 3078F DIAST BP <80 MM HG: CPT | Mod: CPTII,S$GLB,, | Performed by: SURGERY

## 2024-08-05 PROCEDURE — 88313 SPECIAL STAINS GROUP 2: CPT | Mod: 26,,, | Performed by: PATHOLOGY

## 2024-08-05 PROCEDURE — 88342 IMHCHEM/IMCYTCHM 1ST ANTB: CPT | Performed by: PATHOLOGY

## 2024-08-05 PROCEDURE — 99204 OFFICE O/P NEW MOD 45 MIN: CPT | Mod: S$GLB,,, | Performed by: SURGERY

## 2024-08-05 PROCEDURE — 88342 IMHCHEM/IMCYTCHM 1ST ANTB: CPT | Mod: 26,,, | Performed by: PATHOLOGY

## 2024-08-05 PROCEDURE — 88341 IMHCHEM/IMCYTCHM EA ADD ANTB: CPT | Mod: 26,,, | Performed by: PATHOLOGY

## 2024-08-05 PROCEDURE — 25000003 PHARM REV CODE 250: Performed by: NURSE ANESTHETIST, CERTIFIED REGISTERED

## 2024-08-05 PROCEDURE — 76942 ECHO GUIDE FOR BIOPSY: CPT | Mod: 26,,, | Performed by: RADIOLOGY

## 2024-08-05 PROCEDURE — 88365 INSITU HYBRIDIZATION (FISH): CPT | Mod: 26,,, | Performed by: PATHOLOGY

## 2024-08-05 PROCEDURE — 88341 IMHCHEM/IMCYTCHM EA ADD ANTB: CPT | Performed by: PATHOLOGY

## 2024-08-05 PROCEDURE — 3074F SYST BP LT 130 MM HG: CPT | Mod: CPTII,S$GLB,, | Performed by: SURGERY

## 2024-08-05 PROCEDURE — 88307 TISSUE EXAM BY PATHOLOGIST: CPT | Mod: 26,,, | Performed by: PATHOLOGY

## 2024-08-05 PROCEDURE — 37000008 HC ANESTHESIA 1ST 15 MINUTES

## 2024-08-05 PROCEDURE — 63600175 PHARM REV CODE 636 W HCPCS: Performed by: NURSE ANESTHETIST, CERTIFIED REGISTERED

## 2024-08-05 PROCEDURE — 88365 INSITU HYBRIDIZATION (FISH): CPT | Performed by: PATHOLOGY

## 2024-08-05 PROCEDURE — 25000003 PHARM REV CODE 250: Performed by: ANESTHESIOLOGY

## 2024-08-05 PROCEDURE — 88313 SPECIAL STAINS GROUP 2: CPT | Performed by: PATHOLOGY

## 2024-08-05 PROCEDURE — 99999 PR PBB SHADOW E&M-EST. PATIENT-LVL III: CPT | Mod: PBBFAC,,, | Performed by: SURGERY

## 2024-08-05 PROCEDURE — 88307 TISSUE EXAM BY PATHOLOGIST: CPT | Performed by: PATHOLOGY

## 2024-08-05 PROCEDURE — 37000009 HC ANESTHESIA EA ADD 15 MINS

## 2024-08-05 RX ORDER — DEXMEDETOMIDINE HYDROCHLORIDE 100 UG/ML
INJECTION, SOLUTION INTRAVENOUS
Status: DISCONTINUED | OUTPATIENT
Start: 2024-08-05 | End: 2024-08-05

## 2024-08-05 RX ORDER — PROPOFOL 10 MG/ML
VIAL (ML) INTRAVENOUS CONTINUOUS PRN
Status: DISCONTINUED | OUTPATIENT
Start: 2024-08-05 | End: 2024-08-05

## 2024-08-05 RX ORDER — OXYCODONE HYDROCHLORIDE 5 MG/1
15 TABLET ORAL ONCE AS NEEDED
Status: COMPLETED | OUTPATIENT
Start: 2024-08-05 | End: 2024-08-05

## 2024-08-05 RX ORDER — HYDROMORPHONE HYDROCHLORIDE 1 MG/ML
0.2 INJECTION, SOLUTION INTRAMUSCULAR; INTRAVENOUS; SUBCUTANEOUS EVERY 5 MIN PRN
Status: DISCONTINUED | OUTPATIENT
Start: 2024-08-05 | End: 2024-08-06 | Stop reason: HOSPADM

## 2024-08-05 RX ORDER — ONDANSETRON HYDROCHLORIDE 2 MG/ML
4 INJECTION, SOLUTION INTRAVENOUS DAILY PRN
Status: DISCONTINUED | OUTPATIENT
Start: 2024-08-05 | End: 2024-08-06 | Stop reason: HOSPADM

## 2024-08-05 RX ORDER — FENTANYL CITRATE 50 UG/ML
INJECTION, SOLUTION INTRAMUSCULAR; INTRAVENOUS
Status: DISCONTINUED | OUTPATIENT
Start: 2024-08-05 | End: 2024-08-05

## 2024-08-05 RX ORDER — GLUCAGON 1 MG
1 KIT INJECTION
Status: DISCONTINUED | OUTPATIENT
Start: 2024-08-05 | End: 2024-08-06 | Stop reason: HOSPADM

## 2024-08-05 RX ORDER — HALOPERIDOL 5 MG/ML
0.5 INJECTION INTRAMUSCULAR EVERY 10 MIN PRN
Status: DISCONTINUED | OUTPATIENT
Start: 2024-08-05 | End: 2024-08-06 | Stop reason: HOSPADM

## 2024-08-05 RX ORDER — OXYCODONE HYDROCHLORIDE 5 MG/1
5 TABLET ORAL
Status: DISCONTINUED | OUTPATIENT
Start: 2024-08-05 | End: 2024-08-06 | Stop reason: HOSPADM

## 2024-08-05 RX ORDER — LIDOCAINE HYDROCHLORIDE 20 MG/ML
INJECTION, SOLUTION EPIDURAL; INFILTRATION; INTRACAUDAL; PERINEURAL
Status: DISCONTINUED | OUTPATIENT
Start: 2024-08-05 | End: 2024-08-05

## 2024-08-05 RX ORDER — SODIUM CHLORIDE 9 MG/ML
INJECTION, SOLUTION INTRAVENOUS CONTINUOUS
Status: DISCONTINUED | OUTPATIENT
Start: 2024-08-05 | End: 2024-08-06 | Stop reason: HOSPADM

## 2024-08-05 RX ORDER — ONDANSETRON HYDROCHLORIDE 2 MG/ML
4 INJECTION, SOLUTION INTRAVENOUS EVERY 6 HOURS PRN
Status: DISCONTINUED | OUTPATIENT
Start: 2024-08-05 | End: 2024-08-06 | Stop reason: HOSPADM

## 2024-08-05 RX ORDER — LIDOCAINE HYDROCHLORIDE 10 MG/ML
1 INJECTION, SOLUTION EPIDURAL; INFILTRATION; INTRACAUDAL; PERINEURAL ONCE
Status: DISCONTINUED | OUTPATIENT
Start: 2024-08-05 | End: 2024-08-06 | Stop reason: HOSPADM

## 2024-08-05 RX ORDER — MIDAZOLAM HYDROCHLORIDE 1 MG/ML
INJECTION INTRAMUSCULAR; INTRAVENOUS
Status: DISCONTINUED | OUTPATIENT
Start: 2024-08-05 | End: 2024-08-05

## 2024-08-05 RX ADMIN — FENTANYL CITRATE 50 MCG: 50 INJECTION, SOLUTION INTRAMUSCULAR; INTRAVENOUS at 12:08

## 2024-08-05 RX ADMIN — PROPOFOL 125 MCG/KG/MIN: 10 INJECTION, EMULSION INTRAVENOUS at 12:08

## 2024-08-05 RX ADMIN — FENTANYL CITRATE 25 MCG: 50 INJECTION, SOLUTION INTRAMUSCULAR; INTRAVENOUS at 12:08

## 2024-08-05 RX ADMIN — DEXMEDETOMIDINE 4 MCG: 200 INJECTION, SOLUTION INTRAVENOUS at 12:08

## 2024-08-05 RX ADMIN — SODIUM CHLORIDE: 0.9 INJECTION, SOLUTION INTRAVENOUS at 12:08

## 2024-08-05 RX ADMIN — OXYCODONE 15 MG: 5 TABLET ORAL at 01:08

## 2024-08-05 RX ADMIN — LIDOCAINE HYDROCHLORIDE 100 MG: 20 INJECTION, SOLUTION EPIDURAL; INFILTRATION; INTRACAUDAL; PERINEURAL at 12:08

## 2024-08-05 RX ADMIN — PROPOFOL 50 MG: 10 INJECTION, EMULSION INTRAVENOUS at 12:08

## 2024-08-05 RX ADMIN — MIDAZOLAM HYDROCHLORIDE 2 MG: 2 INJECTION, SOLUTION INTRAMUSCULAR; INTRAVENOUS at 12:08

## 2024-08-05 NOTE — H&P
Radiology History & Physical      SUBJECTIVE:     Chief Complaint: Elevated LFTs    History of Present Illness:  Jhonny Diana is a 34 y.o. male who presents for transplant liver biopsy under image-guidance.     Past Medical History:   Diagnosis Date    Abdominal pain 5/27/2018    Alcoholic cirrhosis 8/14/2018    Alcoholic hepatitis with ascites     Anemia     CKD (chronic kidney disease) stage 3, GFR 30-59 ml/min 8/14/2018    Encounter for blood transfusion     multiple blood transfusions    ESRD on dialysis     History of hematemesis 9/28/2017    Hypertension     Seizures     Substance abuse     alcohol and opioids    Thrombocytopenia     Transplanted liver     10/2017     Past Surgical History:   Procedure Laterality Date    APPENDECTOMY      CENTRAL VENOUS CATHETER TUNNELED INSERTION DOUBLE LUMEN      inserted and removed    ERCP      ERCP N/A 7/2/2018    Procedure: ERCP;  Surgeon: Solis Villanueva MD;  Location: [facility] ([address] [place] [address]);  Service: Endoscopy;  Laterality: N/A;  dialysis/cirrhosis/labs prior to ERCP/svn  platelet    ESOPHAGOGASTRODUODENOSCOPY      ESOPHAGOGASTRODUODENOSCOPY N/A 11/25/2019    Procedure: EGD (ESOPHAGOGASTRODUODENOSCOPY);  Surgeon: Sheri Siddiqui MD;  Location: [facility] ([address] [place] [address]);  Service: Endoscopy;  Laterality: N/A;    LIVER TRANSPLANT      10/2017       Home Meds:   Prior to Admission medications    Medication Sig Start Date End Date Taking? Authorizing Provider   diazePAM (VALIUM) 2 MG tablet Take 2 mg by mouth 2 (two) times a day.   Yes Provider, Historical   oxyCODONE (ROXICODONE) 15 MG Tab Take 10 mg by mouth 3 (three) times daily.   Yes Provider, Historical   predniSONE (DELTASONE) 10 MG tablet Take 4 tablets (40 mg total) by mouth once daily.  Patient taking differently: Take 20 mg by mouth once daily. 2 tabs a day 7/16/24  Yes Radha Palacios MD   sirolimus (RAPAMUNE) 1 MG Tab Take 1 tablet (1 mg total) by mouth once daily. 1/3/24 1/2/25 Yes Darnell Julio,  MD   tiZANidine 4 mg Cap Take 1 tablet by mouth daily as needed.   Yes Provider, Historical   torsemide (DEMADEX) 10 MG Tab  10/4/23  Yes Provider, Historical   valACYclovir (VALTREX) 500 MG tablet Take by mouth once daily. 7/10/23  Yes Provider, Historical   naloxone (NARCAN) 4 mg/actuation Spry CALL 911. SPR CONTENTS OF ONE SPRAYER (0.1ML) INTO ONE NOSTRIL. REPEAT IN 2-3 MIN IF SYMPTOMS OF OPIOID EMERGENCY PERSIST, ALTERNATE NOSTRILS 4/13/23   Provider, Historical   cephALEXin (KEFLEX) 500 MG capsule Take 500 mg by mouth 2 (two) times daily. 5/28/24 8/5/24  Provider, Historical   tiZANidine (ZANAFLEX) 4 MG tablet Take 4 mg by mouth every 6 (six) hours as needed.  8/5/24  Provider, Historical   ursodioL (ACTIGALL) 300 mg capsule Take 1 capsule (300 mg total) by mouth 2 (two) times daily. 6/7/24 8/5/24  Laura Morgan MD     Anticoagulants/Antiplatelets: no anticoagulation    Allergies:   Review of patient's allergies indicates:   Allergen Reactions    Bactrim [sulfamethoxazole-trimethoprim] Other (See Comments)     Mookie Trell Syndrome     Sedation History:  have not been any systemic reactions    Review of Systems:   Hematological: no known coagulopathies  Respiratory: no shortness of breath  Cardiovascular: no chest pain  Gastrointestinal: no abdominal pain  Genito-Urinary: no dysuria  Musculoskeletal: negative  Neurological: no TIA or stroke symptoms         OBJECTIVE:     Vital Signs (Most Recent)  Temp: 98 °F (36.7 °C) (08/05/24 1056)  Pulse: (!) 59 (08/05/24 1056)  Resp: 20 (08/05/24 1056)  BP: 114/63 (08/05/24 1056)  SpO2: 96 % (08/05/24 1056)    Physical Exam:  ASA: per anesthesia  Mallampati: per anesthesia    General: no acute distress  Mental Status: alert and oriented to person, place and time  HEENT: normocephalic, atraumatic  Chest: unlabored breathing  Heart: regular heart rate  Abdomen: nondistended, mild generalized tenderness to palpation  Extremity: moves all extremities    Laboratory  Lab  Results   Component Value Date    INR 1.0 12/06/2019       Lab Results   Component Value Date    WBC 5.24 07/27/2024    HGB 13.3 (L) 07/27/2024    HCT 40.4 07/27/2024     (H) 07/27/2024    PLT 59 (L) 07/27/2024      Lab Results   Component Value Date    GLU 82 07/27/2024     07/27/2024    K 4.1 07/27/2024     07/27/2024    CO2 21 (L) 07/27/2024    BUN 23 (H) 07/27/2024    CREATININE 1.3 07/27/2024    CALCIUM 9.2 07/27/2024    MG 1.7 07/27/2024    ALT 83 (H) 07/27/2024    AST 58 (H) 07/27/2024    ALBUMIN 3.1 (L) 07/27/2024    BILITOT 2.0 (H) 07/27/2024    BILIDIR 0.15 07/12/2019       ASSESSMENT/PLAN:     Sedation Plan: Per anesthesia  Patient will undergo transplant liver biopsy under image-guidance.    Sylvia Nelson MD  Radiology, PGY III  [facility]

## 2024-08-05 NOTE — DISCHARGE SUMMARY
Radiology Discharge Summary      Hospital Course: No complications    Admit Date: 8/5/2024  Discharge Date: 08/05/2024     Instructions Given to Patient: Yes  Diet: Resume prior diet  Activity: activity as tolerated and no driving for today    Description of Condition on Discharge: Stable  Vital Signs (Most Recent): Temp: 98 °F (36.7 °C) (08/05/24 1056)  Pulse: (!) 59 (08/05/24 1056)  Resp: 20 (08/05/24 1056)  BP: 114/63 (08/05/24 1056)  SpO2: 96 % (08/05/24 1056)    Discharge Disposition: Home    Discharge Diagnosis: Elevated LFTs s/p random liver biopsy    Follow up: As scheduled    Josh Rose M.D.  Interventional Radiology  Department of Radiology

## 2024-08-05 NOTE — PROCEDURES
Radiology Post-Procedure Note    Pre Op Diagnosis: Elevated LFTs    Post Op Diagnosis: Same    Procedure: Random liver biopsy    Procedure performed by: Josh Rose MD    Written Informed Consent Obtained: Yes  Specimen Removed: YES 2 x 18 gauge cores  Estimated Blood Loss: Minimal    Findings:   Under US guidance, 17/18 gauge biopsy system was used to sample right liver lobe. No complications. See dictation.    Patient tolerated procedure well.    Josh Rose M.D.  Interventional Radiology  Department of Radiology

## 2024-08-05 NOTE — PLAN OF CARE
Pt arrived to IR rm 188 for transplant liver bx. Pt oriented to unit and staff, Pt safely transferred from stretcher to procedural table. Fall risk reviewed and comfort measures utilized with interventions. Safety strap applied, position pillows to minimize pressure points. Blankets applied. Pt prepped and draped utilizing standard sterile technique. Patient placed on continuous monitoring, as required by sedation policy. Timeouts implemented utilizing standard universal time-out per department and facility policy. CRNA to remain at bedside with continuous monitoring. Pt resting comfortably. Denies pain/discomfort. Will continue to monitor. See flow sheets for monitoring, medication administration, and updates. patient verbalizes understanding.           English

## 2024-08-05 NOTE — DISCHARGE INSTRUCTIONS
For questions or concerns call: MICHELLE MON-FRI 8 AM- 5PM [phone]. Radiology resident on call [phone].

## 2024-08-06 ENCOUNTER — TELEPHONE (OUTPATIENT)
Dept: HEPATOLOGY | Facility: CLINIC | Age: 34
End: 2024-08-06
Payer: MEDICARE

## 2024-08-06 PROBLEM — K52.9 GASTROENTERITIS: Status: RESOLVED | Noted: 2019-12-06 | Resolved: 2024-08-06

## 2024-08-06 PROBLEM — K52.9 ENTERITIS: Status: RESOLVED | Noted: 2019-11-22 | Resolved: 2024-08-06

## 2024-08-06 PROBLEM — B02.9 HERPES ZOSTER WITHOUT COMPLICATION: Status: RESOLVED | Noted: 2019-12-11 | Resolved: 2024-08-06

## 2024-08-06 PROBLEM — R11.2 INTRACTABLE NAUSEA AND VOMITING: Status: RESOLVED | Noted: 2018-01-11 | Resolved: 2024-08-06

## 2024-08-06 LAB
COMMENT: NORMAL
FINAL PATHOLOGIC DIAGNOSIS: NORMAL
GROSS: NORMAL
Lab: NORMAL
MICROSCOPIC EXAM: NORMAL

## 2024-08-08 ENCOUNTER — CONFERENCE (OUTPATIENT)
Dept: TRANSPLANT | Facility: CLINIC | Age: 34
End: 2024-08-08
Payer: MEDICARE

## 2024-08-09 DIAGNOSIS — Z94.4 STATUS POST LIVER TRANSPLANT: Primary | ICD-10-CM

## 2024-08-09 NOTE — TELEPHONE ENCOUNTER
Labs and biopsy reviewed with patient as per below.  All questions answered.  Pt repeated back instructions and v/u.  Will repeat labs week of 8/12/24.  New lab orders sent via portal and faxed to lab.    ----- Message from Laura Morgan MD sent at 8/6/2024  6:10 PM CDT -----  Biopsy reviewed. No definite diagnosis of rejection, although it may be difficult to interpret since he has been on steroids. I will submit for review in Path conference. Please start tacrolimus 1/1 with labs in 1 week. No changes to sirolimus at this time.

## 2024-08-11 NOTE — PLAN OF CARE
Problem: Patient Care Overview  Goal: Plan of Care Review  Outcome: Ongoing (interventions implemented as appropriate)  AAOx3, tmax 100.6, c/o pain. Tylenol given x 1.  Pain controlled by prn pain medication.  Albumin given x 1 last night.  Pt still had not had a bowel movement.  SHANE replaced yesterday. Still awaiting xray for placement. Still awaiting pt urinate for urine culture.  No growth to date on blood cultures. Tube feeds held until the morning. TPN/ Lipids given. Pt in lowest position, side rails up x2, non-skid foot wear in place, call light within reach, pt verbalized understanding to call RN when needed. Hand hygiene practiced per protocol. Will continue to monitor.        Unknown

## 2024-08-12 ENCOUNTER — TELEPHONE (OUTPATIENT)
Dept: SURGERY | Facility: CLINIC | Age: 34
End: 2024-08-12
Payer: MEDICARE

## 2024-08-12 LAB
COMMENT: NORMAL
FINAL PATHOLOGIC DIAGNOSIS: NORMAL
GROSS: NORMAL
Lab: NORMAL
MICROSCOPIC EXAM: NORMAL
SUPPLEMENTAL DIAGNOSIS: NORMAL

## 2024-08-12 RX ORDER — TACROLIMUS 1 MG/1
1 CAPSULE ORAL EVERY 12 HOURS
Qty: 60 CAPSULE | Refills: 2 | Status: SHIPPED | OUTPATIENT
Start: 2024-08-12 | End: 2025-08-12

## 2024-08-12 NOTE — TELEPHONE ENCOUNTER
"Spoke with Dr. Morgan from hepatology regarding appropriate surgical date. Per Dr. Rey, "Discuss optimal operative timing with hepatology, goal to minimize steroids and switch from sirolimus to tacrolimus perioperatively." Dr. Morgan stated, "His situation is a little complicated right now and will need a couple of weeks before we decide the right timing for surgery. I started the transition to tacrolimus, but patient is still on sirolimus."    "

## 2024-08-13 ENCOUNTER — TELEPHONE (OUTPATIENT)
Dept: TRANSPLANT | Facility: CLINIC | Age: 34
End: 2024-08-13
Payer: MEDICARE

## 2024-08-13 NOTE — TELEPHONE ENCOUNTER
Transplant SW attempted to contact patient on 2 attempts after message from his Hepatologist regarding concerns. Patient reported interpersonal relationship issues and  from significant other, no specific details noted for immediate safety concerns. SW team asked to check in with patient, assess current mental health state, ensure patient safety, provide support and resources as needed. SW left voicemail for call back.

## 2024-08-14 ENCOUNTER — TELEPHONE (OUTPATIENT)
Dept: TRANSPLANT | Facility: CLINIC | Age: 34
End: 2024-08-14
Payer: MEDICARE

## 2024-08-14 NOTE — TELEPHONE ENCOUNTER
Transplant SW spoke with patient via phone to check in on well being after receiving a message from patient's Hepatologist. Patient reported recent break up from his significant other of 4 years. He described the breakup involved infidelity on his partner's part. SW provided active listening and emotional support. Patient denies any significant mental health concerns at this time, denies any suicidal or homicidal ideations, and denies safety concerns. Patient reports he feels safe in his home and he is in a better place now. Patient notes he met someone new who is very supportive and loyal and this has been helpful. In regards to resources EZEKIEL offered for mental health support, patient notes having a negative experience with a previous mental health provider and he does not feel the need for resources at this time. He states the person he has met is helping him navigate things and he will reach out if he needs any referrals.     Patient does note concerns for his recent medication changes/regimen and would like to speak to his coordinator to better understand what he needs to take and how long. Patient reports he will reach out to his nurse coordinator later today as he is going to a medical appointment soon. Patient denied any other concerns or needs at this time. He was thankful for this call and aware how to reach SW team if needed. SW remains available.

## 2024-08-15 ENCOUNTER — TELEPHONE (OUTPATIENT)
Dept: TRANSPLANT | Facility: CLINIC | Age: 34
End: 2024-08-15
Payer: MEDICARE

## 2024-08-15 ENCOUNTER — PATIENT MESSAGE (OUTPATIENT)
Dept: TRANSPLANT | Facility: CLINIC | Age: 34
End: 2024-08-15
Payer: MEDICARE

## 2024-08-15 NOTE — TELEPHONE ENCOUNTER
Pt inquiring about plan for transition to prograf.  Advised patient to get labs so that levels may be assessed for transition.  Pt states he will get labs soon.    Pt called requesting advice on post surgical care.  Advised patient to speak with his surgical team.

## 2024-08-21 ENCOUNTER — OFFICE VISIT (OUTPATIENT)
Dept: TRANSPLANT | Facility: CLINIC | Age: 34
End: 2024-08-21
Payer: MEDICARE

## 2024-08-21 VITALS
DIASTOLIC BLOOD PRESSURE: 81 MMHG | WEIGHT: 174.81 LBS | BODY MASS INDEX: 28.09 KG/M2 | OXYGEN SATURATION: 98 % | TEMPERATURE: 98 F | HEIGHT: 66 IN | SYSTOLIC BLOOD PRESSURE: 154 MMHG | HEART RATE: 75 BPM

## 2024-08-21 DIAGNOSIS — R79.89 ELEVATED LFTS: ICD-10-CM

## 2024-08-21 DIAGNOSIS — Z79.60 LONG-TERM USE OF IMMUNOSUPPRESSANT MEDICATION: ICD-10-CM

## 2024-08-21 DIAGNOSIS — Z94.4 STATUS POST LIVER TRANSPLANT: ICD-10-CM

## 2024-08-21 DIAGNOSIS — T86.49 BILIARY STRICTURE OF TRANSPLANTED LIVER: ICD-10-CM

## 2024-08-21 DIAGNOSIS — K83.1 BILIARY STRICTURE OF TRANSPLANTED LIVER: ICD-10-CM

## 2024-08-21 PROCEDURE — 1159F MED LIST DOCD IN RCRD: CPT | Mod: CPTII,S$GLB,, | Performed by: INTERNAL MEDICINE

## 2024-08-21 PROCEDURE — 3079F DIAST BP 80-89 MM HG: CPT | Mod: CPTII,S$GLB,, | Performed by: INTERNAL MEDICINE

## 2024-08-21 PROCEDURE — 3077F SYST BP >= 140 MM HG: CPT | Mod: CPTII,S$GLB,, | Performed by: INTERNAL MEDICINE

## 2024-08-21 PROCEDURE — 3008F BODY MASS INDEX DOCD: CPT | Mod: CPTII,S$GLB,, | Performed by: INTERNAL MEDICINE

## 2024-08-21 PROCEDURE — 1160F RVW MEDS BY RX/DR IN RCRD: CPT | Mod: CPTII,S$GLB,, | Performed by: INTERNAL MEDICINE

## 2024-08-21 PROCEDURE — 99999 PR PBB SHADOW E&M-EST. PATIENT-LVL III: CPT | Mod: PBBFAC,,, | Performed by: INTERNAL MEDICINE

## 2024-08-21 PROCEDURE — 99214 OFFICE O/P EST MOD 30 MIN: CPT | Mod: S$GLB,,, | Performed by: INTERNAL MEDICINE

## 2024-08-21 RX ORDER — METHYLNALTREXONE BROMIDE 150 MG/1
TABLET ORAL
COMMUNITY
Start: 2024-01-01

## 2024-08-21 NOTE — PROGRESS NOTES
Transplant Hepatology  Liver Transplant Recipient Follow Up    PCP: Elo Bull MD    Transplant History  Transplant Date: 10/19/2017  UNOS Native Liver Dx: Acute Alcoholic Hepatitis    ORGAN: LIVER  Whole or Partial: whole liver  Donor Type: donation after brain death  [facility] High Risk: no  Donor CMV Status: Positive  Donor HCV Status: Negative  Donor HBcAb: Negative  Donor HBV ANITA:   Donor HCV ANITA: Negative  Biliary Anastomosis: end to end  Arterial Anatomy: standard  IVC reconstruction: end to end ivc  Portal vein status: patent    He has had the following complications since transplant: LAURA previously on HD, anastomotic stricture, ETOH relapse (6/2019).     Chief complaint: follow up, history of OLT    HPI:  Jhonny Diana is a 34 y.o. male with history of OLT in 10/2017 for alcohol related cirrhosis / acute alcohol related hepatitis who presents for follow up.     Last seen in Transplant Hepatology Clinic on 6/5/2024.    Interval Event:  - Accompanied by mother, Elvira.   - Acute elevation in LFTs in July 2024 of unclear etiology. Pt reports med adherence.  - Started on prednisone 40mg on 7/16/24. Has severe side effects from pred 40.  - Infectious workup was negative.  - Underwent liver biopsy on 8/5/2024, which demonstrated mild - mod nonspecific lobular hepatitis. CMV, HBV, EBV stains negative on biopsy.  - Liver enzymes improving on steroids. Now on prednisone 20mg qd.  - Started on Tac 1/1, and continuing on sirolimus 1mg qd.   - Seen by Dr. Rey 8/5/24 for consideration of b/l inguinal hernia repair. Timing for OR pending on improvement of LFTs and immunosuppression management.    Past Medical History:   Diagnosis Date    Abdominal pain 05/27/2018    Alcoholic cirrhosis 08/14/2018    Alcoholic hepatitis with ascites     Anemia     CKD (chronic kidney disease) stage 3, GFR 30-59 ml/min 08/14/2018    Encounter for blood transfusion     multiple blood transfusions    ESRD on dialysis      Herpes zoster without complication 2019    History of hematemesis 2017    Hypertension     Seizures     Substance abuse     alcohol and opioids    Thrombocytopenia     Transplanted liver     10/2017       Past Surgical History:   Procedure Laterality Date    APPENDECTOMY      CENTRAL VENOUS CATHETER TUNNELED INSERTION DOUBLE LUMEN      inserted and removed    ERCP      ERCP N/A 2018    Procedure: ERCP;  Surgeon: Solis Villanueva MD;  Location: [facility] ENDO (2ND FLR);  Service: Endoscopy;  Laterality: N/A;  dialysis/cirrhosis/labs prior to ERCP/svn  platelet    ESOPHAGOGASTRODUODENOSCOPY      ESOPHAGOGASTRODUODENOSCOPY N/A 2019    Procedure: EGD (ESOPHAGOGASTRODUODENOSCOPY);  Surgeon: Sheri Siddiqui MD;  [facility]  Service: Endoscopy;  Laterality: N/A;    LIVER TRANSPLANT      10/2017       Family History   Problem Relation Name Age of Onset    No Known Problems Mother      Cancer Father          Lung cancer    Alcohol abuse Father      No Known Problems Sister 3     No Known Problems Brother 1     Kidney disease Neg Hx         Social History     Tobacco Use    Smoking status: Former     Current packs/day: 0.00     Average packs/day: 1 pack/day for 10.0 years (10.0 ttl pk-yrs)     Types: Cigarettes     Start date: 2007     Quit date: 2017     Years since quittin.5    Smokeless tobacco: Never   Substance Use Topics    Alcohol use: No     Comment: a fifth of liquor daily for years, cut back over last 2 months    Drug use: No       Current Outpatient Medications   Medication Sig Dispense Refill    diazePAM (VALIUM) 2 MG tablet Take 2 mg by mouth 2 (two) times a day.      oxyCODONE (ROXICODONE) 15 MG Tab Take 10 mg by mouth 3 (three) times daily.      predniSONE (DELTASONE) 10 MG tablet Take 4 tablets (40 mg total) by mouth once daily. (Patient taking differently: Take 20 mg by mouth once daily. 2 tabs a day) 120 tablet 1    sirolimus (RAPAMUNE) 1 MG Tab Take 1 tablet  "(1 mg total) by mouth once daily. 30 tablet 11    tacrolimus (PROGRAF) 1 MG Cap Take 1 capsule (1 mg total) by mouth every 12 (twelve) hours. 60 capsule 2    tiZANidine 4 mg Cap Take 1 tablet by mouth daily as needed.      torsemide (DEMADEX) 10 MG Tab       valACYclovir (VALTREX) 500 MG tablet Take by mouth once daily.      methylnaltrexone (RELISTOR) 150 mg Tab  (Patient not taking: Reported on 8/21/2024)      naloxone (NARCAN) 4 mg/actuation Spry CALL 911. SPR CONTENTS OF ONE SPRAYER (0.1ML) INTO ONE NOSTRIL. REPEAT IN 2-3 MIN IF SYMPTOMS OF OPIOID EMERGENCY PERSIST, ALTERNATE NOSTRILS (Patient not taking: Reported on 8/21/2024)       No current facility-administered medications for this visit.       Review of patient's allergies indicates:   Allergen Reactions    Bactrim [sulfamethoxazole-trimethoprim] Other (See Comments)     Mookie Trell Syndrome            Vitals:    08/21/24 1020   BP: (!) 154/81   Pulse: 75   Temp: 97.7 °F (36.5 °C)   TempSrc: Temporal   SpO2: 98%   Weight: 79.3 kg (174 lb 13.2 oz)   Height: 5' 6" (1.676 m)   Body mass index is 28.22 kg/m².    Physical Exam  Constitutional:       General: He is not in acute distress.     Appearance: He is not toxic-appearing.   Eyes:      General: No scleral icterus.  Cardiovascular:      Rate and Rhythm: Normal rate.   Pulmonary:      Effort: Pulmonary effort is normal.   Abdominal:      General: There is no distension.   Skin:     Coloration: Skin is not jaundiced.   Neurological:      General: No focal deficit present.      Mental Status: He is alert.   Psychiatric:         Thought Content: Thought content normal.         LABS:  Lab Results   Component Value Date    WBC 5.24 07/27/2024    HGB 13.3 (L) 07/27/2024    HCT 40.4 07/27/2024     (H) 07/27/2024    PLT 59 (L) 07/27/2024       Lab Results   Component Value Date     07/27/2024    K 4.1 07/27/2024     07/27/2024    CO2 21 (L) 07/27/2024    BUN 23 (H) 07/27/2024    CREATININE 1.3 " 07/27/2024    CALCIUM 9.2 07/27/2024    ANIONGAP 10 07/27/2024    ESTGFRAFRICA 53.5 (A) 12/11/2019    EGFRNONAA 46.3 (A) 12/11/2019       Lab Results   Component Value Date    ALT 83 (H) 07/27/2024    AST 58 (H) 07/27/2024     (H) 10/26/2017    ALKPHOS 149 (H) 07/27/2024    BILITOT 2.0 (H) 07/27/2024       Lab Results   Component Value Date    TACROLIMUS <1.5 (L) 03/04/2018         Assessment:   Jhonny Diana is a 34 y.o. male with history of OLT in 10/2017 for alcohol related cirrhosis / acute alcohol related hepatitis who presents for follow up.    1. Status post liver transplant    2. Elevated LFTs    3. Biliary stricture of transplanted liver    4. Long-term use of immunosuppressant medication      Recommendations:  Allograft Function  - Elevated LFTs in July 2024 of unclear etiology. Presumed to be 2/2 rejection.  - Infectious workup was negative.  - Underwent liver biopsy on 8/5/2024, which demonstrated mild - mod nonspecific lobular hepatitis. CMV, HBV, EBV stains negative on biopsy.   - Continue UDCA 300mg BID given hx biliary stricture, elev ALP    Immunosuppression   - Start tapering off prednisone (pred 20mg qd --> pred 10mg qd x7d, pred 5mg qd x7d, then off prednisone)  - Continue Tac 1/1, goal 6-8  - Continue sirolimus 1mg daily for now  - Plan to stop sirolimus in anticipation for surgery in near future    Kidney function   - Stable creatinine 1.3 on 7/27/24  - Avoid NSAIDs as able and maintain adequate hydration    Health Maintenance/Screening:  - Recommend age appropriate cancer screening  - Skin cancer: Recommend use of sunscreen SPF 30 or higher, hat and sunglasses while outside, dermatologist visit annually or sooner if any concerning lesions.  - Osteoporosis: Recommend bone density testing every 2-3 years if previously normal or annually if previously abnormal.    Return to clinic in 2 months.    UNOS Patient Status  Functional Status: 70% - Cares for self: unable to carry on normal  activity or active work  Physical Capacity: Limited Mobility    I spent a total of 30 minutes on the day of the visit. This includes face to face time and non-face to face time preparing to see the patient (eg, review of tests), obtaining and/or reviewing separately obtained history, documenting clinical information in the electronic or other health record, independently interpreting results, and communicating results to the patient/family/caregiver, or care coordination.    Laura Morgan MD  Staff Physician  Hepatology and Liver Transplant  [facility]  [facility]

## 2024-08-21 NOTE — Clinical Note
He showed me the results from labs done earlier today. LFTs almost normal. Instructed him to decrease pred to 10mg qd x7 days, then 5mg qd x7 days then off. Continue Tac 1/1.  Continue sirolimus 1 for now. Goal to get him off sirolimus in the next couple of weeks in preparation for surgery. RTC in 2 months.

## 2024-08-21 NOTE — LETTER
August 21, 2024                      [facility]  [address]  [facility] [address]  Phone: [phone]   Patient: Jhonny Diana   MR Number: [id number]   YOB: 1990   Date of Visit: 8/21/2024       Dear       Thank you for referring Jhonny Diana to me for evaluation. Attached you will find relevant portions of my assessment and plan of care.    If you have questions, please do not hesitate to call me. I look forward to following Jhonny Diana along with you.    Sincerely,    Laura Morgan MD    Enclosure    If you would like to receive this communication electronically, please contact [email or web address] or [phone] to request [facility] access.    [facility] is a tool which provides read-only access to select patient information with whom you have a relationship. Its easy to use and provides real time access to review your patients record including encounter summaries, notes, results, and demographic information.    If you feel you have received this communication in error or would no longer like to receive these types of communications, please e-mail [email or web address]

## 2024-08-21 NOTE — PROGRESS NOTES
Sent the following message to Dr. Morgan via secure chat in Epic: Mr. Jhonny Diana has a Platelet count of 43 called from Anaya Mendoza with [facility] dept in [place].

## 2024-08-28 ENCOUNTER — PATIENT MESSAGE (OUTPATIENT)
Dept: TRANSPLANT | Facility: CLINIC | Age: 34
End: 2024-08-28
Payer: MEDICARE

## 2024-09-07 NOTE — PLAN OF CARE
Problem: Patient Care Overview  Goal: Plan of Care Review  Outcome: Ongoing (interventions implemented as appropriate)  Pt with multiple spikes in temp throughout shift. Tylenol given per order. Pt to have CT scans of sinuses, chest, abdomen tonight. Tube feeds held for CT scan. Pt had small BM today. Pt free from falls and injury throughout shift.       Not applicable

## 2024-09-12 ENCOUNTER — PATIENT MESSAGE (OUTPATIENT)
Dept: TRANSPLANT | Facility: CLINIC | Age: 34
End: 2024-09-12
Payer: MEDICARE

## 2024-09-12 ENCOUNTER — TELEPHONE (OUTPATIENT)
Dept: TRANSPLANT | Facility: CLINIC | Age: 34
End: 2024-09-12
Payer: MEDICARE

## 2024-09-12 LAB
EXT ALBUMIN: 3.4
EXT ALKALINE PHOSPHATASE: 167
EXT ALT: 29
EXT AST: 41
EXT BASOPHIL%: 0.7
EXT BILIRUBIN TOTAL: 2.1
EXT BUN: 30
EXT CALCIUM: 9.1
EXT CHLORIDE: 110
EXT CO2: 22
EXT CREATININE: 2.19 MG/DL
EXT EGFR NO RACE VARIABLE: 39
EXT EOSINOPHIL%: 2.6
EXT GLUCOSE: 97
EXT HEMATOCRIT: 34.3
EXT HEMOGLOBIN: 12.5
EXT LYMPH%: 29.6
EXT MONOCYTES%: 12.8
EXT PLATELETS: 78
EXT POTASSIUM: 4.1
EXT PROTEIN TOTAL: 6.9
EXT SEGS%: 54.3
EXT SODIUM: 139 MMOL/L
EXT TACROLIMUS LVL: 13.9
EXT WBC: 3.2

## 2024-09-12 NOTE — TELEPHONE ENCOUNTER
Pt advised that labs are due. Requested he let us know when and where he will have labs drawn. Awaiting update.  ----- Message from Laura Morgan MD sent at 9/12/2024 10:26 AM CDT -----  Have we received any labs from Mr. Diana? Can we please arrange for him to get labs this week or early next week?     Thank you.

## 2024-09-13 DIAGNOSIS — Z94.4 STATUS POST LIVER TRANSPLANT: ICD-10-CM

## 2024-09-13 RX ORDER — TACROLIMUS 0.5 MG/1
0.5 CAPSULE ORAL EVERY 12 HOURS
Qty: 60 CAPSULE | Refills: 11 | Status: SHIPPED | OUTPATIENT
Start: 2024-09-13 | End: 2025-09-13

## 2024-09-13 NOTE — TELEPHONE ENCOUNTER
Spoke with pt. He states he did not take medications prior to lab. He verbalized understanding that we need to decrease tac to 0.5mg BID. He does not have 0.5mg capsules so will start dose change as soon as he gets new prescription. He will repeat labs 9/23/24. Discussed him the importance of hydrating well daily. He states he understands.   ----- Message from Laura Morgan MD sent at 9/13/2024  7:59 AM CDT -----  Labs reviewed.     Tac supratherapeutic. Please decrease tac to 0.5/0.5 from 1/1.     Cr is increased. Please encourage hydration.    Labs early next week. Thank you.

## 2024-09-13 NOTE — ASSESSMENT & PLAN NOTE
- Maintenance IS with cyclosporine --> transitioned to Prograf 2/24 for continued nausea but did not tolerate from neuro standpoint.  PT IS NOT A CANDIDATE FOR TACROLIMUS.    - Transitioned back to cyclosporine 3/1  - D/c cyclosporine, started rapa & stress dose steroids 3/10 for continued confusion  - Mental status now improved, re-trial cyclosporine 3/13, d/c rapa 3/13  - Transition back to prednisone taper from stress dose steroids 3/15 --> will decrease by 5 mg q week (decrease to 5 mg 4/6).  - Restarted low dose MMF 3/23- with nausea --> D/C Cellcept 3/31 --> nausea improved --> continue to hold as has passed protocol for Cellcept   Reason for Admission: seizure, metabolic encephalopathy    Cognitive/Mentation: NURIS  Neuros/CMS: Intact ex NURIS most neuros. Pt moves all 4 extremities spontaneously  VS: stable on RA. SBP <180  /GI: Incontinent.  Pulmonary: LS diminished LL.  Pain: flacc scale, score 3. Lidocaine patches on lower back     Drains/Lines: L hand PIV SL  Skin: redness on sacrum.   Activity: not OOB due to confusion and impulsiveness  Diet: regular with thin liquids. Takes pills crushed in applesauce.     Discharge: pending    Aggression Stoplight Tool: green    End of shift summary: Covid precautions maintained. Sitter at bedside for pulling lines and impulsiveness. Scheduled Seroquel and ativan given. LP and EEG negative for abnormalities.

## 2024-09-19 ENCOUNTER — HOSPITAL ENCOUNTER (INPATIENT)
Facility: HOSPITAL | Age: 34
LOS: 2 days | Discharge: HOME | DRG: 442 | End: 2024-09-21
Attending: EMERGENCY MEDICINE | Admitting: EMERGENCY MEDICINE
Payer: MEDICARE

## 2024-09-19 DIAGNOSIS — R53.1 WEAKNESS: ICD-10-CM

## 2024-09-19 DIAGNOSIS — R41.82 ALTERED MENTAL STATUS: ICD-10-CM

## 2024-09-19 DIAGNOSIS — R07.9 CHEST PAIN: ICD-10-CM

## 2024-09-19 DIAGNOSIS — R52 PAIN: ICD-10-CM

## 2024-09-19 DIAGNOSIS — K76.82 HEPATIC ENCEPHALOPATHY: Primary | ICD-10-CM

## 2024-09-19 LAB
ALBUMIN SERPL BCP-MCNC: 2.7 G/DL (ref 3.5–5.2)
ALLENS TEST: NORMAL
ALP SERPL-CCNC: 143 U/L (ref 55–135)
ALT SERPL W/O P-5'-P-CCNC: 22 U/L (ref 10–44)
AMMONIA PLAS-SCNC: 81 UMOL/L (ref 10–50)
AMPHET+METHAMPHET UR QL: NEGATIVE
ANION GAP SERPL CALC-SCNC: 10 MMOL/L (ref 8–16)
APTT PPP: 34.5 SEC (ref 21–32)
AST SERPL-CCNC: 40 U/L (ref 10–40)
BARBITURATES UR QL SCN>200 NG/ML: NEGATIVE
BASOPHILS # BLD AUTO: 0.02 K/UL (ref 0–0.2)
BASOPHILS NFR BLD: 0.4 % (ref 0–1.9)
BENZODIAZ UR QL SCN>200 NG/ML: ABNORMAL
BILIRUB SERPL-MCNC: 2.5 MG/DL (ref 0.1–1)
BILIRUB UR QL STRIP: NEGATIVE
BUN SERPL-MCNC: 32 MG/DL (ref 6–20)
BZE UR QL SCN: NEGATIVE
CALCIUM SERPL-MCNC: 9.1 MG/DL (ref 8.7–10.5)
CANNABINOIDS UR QL SCN: ABNORMAL
CHLORIDE SERPL-SCNC: 119 MMOL/L (ref 95–110)
CLARITY UR REFRACT.AUTO: CLEAR
CO2 SERPL-SCNC: 16 MMOL/L (ref 23–29)
COLOR UR AUTO: YELLOW
CREAT SERPL-MCNC: 2.2 MG/DL (ref 0.5–1.4)
CREAT UR-MCNC: 183 MG/DL (ref 23–375)
DIFFERENTIAL METHOD BLD: ABNORMAL
EOSINOPHIL # BLD AUTO: 0.1 K/UL (ref 0–0.5)
EOSINOPHIL NFR BLD: 1 % (ref 0–8)
ERYTHROCYTE [DISTWIDTH] IN BLOOD BY AUTOMATED COUNT: 12.9 % (ref 11.5–14.5)
EST. GFR  (NO RACE VARIABLE): 39.1 ML/MIN/1.73 M^2
ETHANOL SERPL-MCNC: <10 MG/DL
GLUCOSE SERPL-MCNC: 103 MG/DL (ref 70–110)
GLUCOSE UR QL STRIP: NEGATIVE
HCT VFR BLD AUTO: 33.8 % (ref 40–54)
HGB BLD-MCNC: 11 G/DL (ref 14–18)
HGB UR QL STRIP: ABNORMAL
IMM GRANULOCYTES # BLD AUTO: 0.01 K/UL (ref 0–0.04)
IMM GRANULOCYTES NFR BLD AUTO: 0.2 % (ref 0–0.5)
INR PPP: 1.1 (ref 0.8–1.2)
KETONES UR QL STRIP: NEGATIVE
LDH SERPL L TO P-CCNC: 1.41 MMOL/L (ref 0.5–2.2)
LEUKOCYTE ESTERASE UR QL STRIP: NEGATIVE
LIPASE SERPL-CCNC: 14 U/L (ref 4–60)
LYMPHOCYTES # BLD AUTO: 1.2 K/UL (ref 1–4.8)
LYMPHOCYTES NFR BLD: 26 % (ref 18–48)
MAGNESIUM SERPL-MCNC: 1.5 MG/DL (ref 1.6–2.6)
MCH RBC QN AUTO: 32.6 PG (ref 27–31)
MCHC RBC AUTO-ENTMCNC: 32.5 G/DL (ref 32–36)
MCV RBC AUTO: 100 FL (ref 82–98)
METHADONE UR QL SCN>300 NG/ML: NEGATIVE
MONOCYTES # BLD AUTO: 0.6 K/UL (ref 0.3–1)
MONOCYTES NFR BLD: 11.5 % (ref 4–15)
NEUTROPHILS # BLD AUTO: 2.9 K/UL (ref 1.8–7.7)
NEUTROPHILS NFR BLD: 60.9 % (ref 38–73)
NITRITE UR QL STRIP: NEGATIVE
NRBC BLD-RTO: 0 /100 WBC
OHS QRS DURATION: 86 MS
OHS QTC CALCULATION: 464 MS
OPIATES UR QL SCN: ABNORMAL
PCP UR QL SCN>25 NG/ML: NEGATIVE
PH UR STRIP: 7 [PH] (ref 5–8)
PLATELET # BLD AUTO: 70 K/UL (ref 150–450)
PMV BLD AUTO: 10.2 FL (ref 9.2–12.9)
POCT GLUCOSE: 96 MG/DL (ref 70–110)
POTASSIUM SERPL-SCNC: 4 MMOL/L (ref 3.5–5.1)
PROT SERPL-MCNC: 6.3 G/DL (ref 6–8.4)
PROT UR QL STRIP: ABNORMAL
PROTHROMBIN TIME: 12 SEC (ref 9–12.5)
RBC # BLD AUTO: 3.37 M/UL (ref 4.6–6.2)
SAMPLE: NORMAL
SARS-COV-2 RDRP RESP QL NAA+PROBE: NEGATIVE
SITE: NORMAL
SODIUM SERPL-SCNC: 145 MMOL/L (ref 136–145)
SP GR UR STRIP: 1.02 (ref 1–1.03)
TOXICOLOGY INFORMATION: ABNORMAL
TROPONIN I SERPL DL<=0.01 NG/ML-MCNC: 0.01 NG/ML (ref 0–0.03)
URN SPEC COLLECT METH UR: ABNORMAL
WBC # BLD AUTO: 4.77 K/UL (ref 3.9–12.7)

## 2024-09-19 PROCEDURE — 85610 PROTHROMBIN TIME: CPT

## 2024-09-19 PROCEDURE — 85730 THROMBOPLASTIN TIME PARTIAL: CPT

## 2024-09-19 PROCEDURE — 96367 TX/PROPH/DG ADDL SEQ IV INF: CPT

## 2024-09-19 PROCEDURE — 93010 ELECTROCARDIOGRAM REPORT: CPT | Mod: ,,, | Performed by: INTERNAL MEDICINE

## 2024-09-19 PROCEDURE — 82962 GLUCOSE BLOOD TEST: CPT

## 2024-09-19 PROCEDURE — 83605 ASSAY OF LACTIC ACID: CPT

## 2024-09-19 PROCEDURE — 84484 ASSAY OF TROPONIN QUANT: CPT

## 2024-09-19 PROCEDURE — 85025 COMPLETE CBC W/AUTO DIFF WBC: CPT

## 2024-09-19 PROCEDURE — 80053 COMPREHEN METABOLIC PANEL: CPT

## 2024-09-19 PROCEDURE — 20600001 HC STEP DOWN PRIVATE ROOM

## 2024-09-19 PROCEDURE — 93005 ELECTROCARDIOGRAM TRACING: CPT

## 2024-09-19 PROCEDURE — 87040 BLOOD CULTURE FOR BACTERIA: CPT

## 2024-09-19 PROCEDURE — 63600175 PHARM REV CODE 636 W HCPCS

## 2024-09-19 PROCEDURE — U0002 COVID-19 LAB TEST NON-CDC: HCPCS

## 2024-09-19 PROCEDURE — 82077 ASSAY SPEC XCP UR&BREATH IA: CPT

## 2024-09-19 PROCEDURE — 25000003 PHARM REV CODE 250

## 2024-09-19 PROCEDURE — 99285 EMERGENCY DEPT VISIT HI MDM: CPT | Mod: 25

## 2024-09-19 PROCEDURE — 83690 ASSAY OF LIPASE: CPT

## 2024-09-19 PROCEDURE — 94761 N-INVAS EAR/PLS OXIMETRY MLT: CPT

## 2024-09-19 PROCEDURE — 80307 DRUG TEST PRSMV CHEM ANLYZR: CPT

## 2024-09-19 PROCEDURE — 80195 ASSAY OF SIROLIMUS: CPT

## 2024-09-19 PROCEDURE — 99900035 HC TECH TIME PER 15 MIN (STAT)

## 2024-09-19 PROCEDURE — 81003 URINALYSIS AUTO W/O SCOPE: CPT

## 2024-09-19 PROCEDURE — 82140 ASSAY OF AMMONIA: CPT

## 2024-09-19 PROCEDURE — 96365 THER/PROPH/DIAG IV INF INIT: CPT

## 2024-09-19 PROCEDURE — 80197 ASSAY OF TACROLIMUS: CPT

## 2024-09-19 PROCEDURE — 83735 ASSAY OF MAGNESIUM: CPT

## 2024-09-19 RX ORDER — OXYCODONE HYDROCHLORIDE 5 MG/1
10 TABLET ORAL 3 TIMES DAILY
Status: DISCONTINUED | OUTPATIENT
Start: 2024-09-19 | End: 2024-09-19

## 2024-09-19 RX ORDER — SODIUM CHLORIDE 0.9 % (FLUSH) 0.9 %
10 SYRINGE (ML) INJECTION EVERY 12 HOURS PRN
Status: DISCONTINUED | OUTPATIENT
Start: 2024-09-19 | End: 2024-09-21 | Stop reason: HOSPADM

## 2024-09-19 RX ORDER — IBUPROFEN 200 MG
24 TABLET ORAL
Status: DISCONTINUED | OUTPATIENT
Start: 2024-09-19 | End: 2024-09-21 | Stop reason: HOSPADM

## 2024-09-19 RX ORDER — IBUPROFEN 200 MG
16 TABLET ORAL
Status: DISCONTINUED | OUTPATIENT
Start: 2024-09-19 | End: 2024-09-21 | Stop reason: HOSPADM

## 2024-09-19 RX ORDER — PROCHLORPERAZINE EDISYLATE 5 MG/ML
5 INJECTION INTRAMUSCULAR; INTRAVENOUS EVERY 6 HOURS PRN
Status: DISCONTINUED | OUTPATIENT
Start: 2024-09-19 | End: 2024-09-21 | Stop reason: HOSPADM

## 2024-09-19 RX ORDER — ONDANSETRON HYDROCHLORIDE 2 MG/ML
4 INJECTION, SOLUTION INTRAVENOUS EVERY 8 HOURS PRN
Status: DISCONTINUED | OUTPATIENT
Start: 2024-09-19 | End: 2024-09-21 | Stop reason: HOSPADM

## 2024-09-19 RX ORDER — NALOXONE HCL 0.4 MG/ML
0.02 VIAL (ML) INJECTION
Status: DISCONTINUED | OUTPATIENT
Start: 2024-09-19 | End: 2024-09-21 | Stop reason: HOSPADM

## 2024-09-19 RX ORDER — PANTOPRAZOLE SODIUM 40 MG/10ML
40 INJECTION, POWDER, LYOPHILIZED, FOR SOLUTION INTRAVENOUS DAILY
Status: DISCONTINUED | OUTPATIENT
Start: 2024-09-20 | End: 2024-09-21 | Stop reason: HOSPADM

## 2024-09-19 RX ORDER — SIROLIMUS 1 MG/1
1 TABLET, FILM COATED ORAL DAILY
Status: DISCONTINUED | OUTPATIENT
Start: 2024-09-20 | End: 2024-09-21 | Stop reason: HOSPADM

## 2024-09-19 RX ORDER — LACTULOSE 10 G/15ML
20 SOLUTION ORAL 3 TIMES DAILY
Status: DISCONTINUED | OUTPATIENT
Start: 2024-09-19 | End: 2024-09-20

## 2024-09-19 RX ORDER — DIAZEPAM 2 MG/1
2 TABLET ORAL EVERY 6 HOURS PRN
Status: DISCONTINUED | OUTPATIENT
Start: 2024-09-19 | End: 2024-09-21 | Stop reason: HOSPADM

## 2024-09-19 RX ORDER — GLUCAGON 1 MG
1 KIT INJECTION
Status: DISCONTINUED | OUTPATIENT
Start: 2024-09-19 | End: 2024-09-21 | Stop reason: HOSPADM

## 2024-09-19 RX ORDER — TACROLIMUS 0.5 MG/1
0.5 CAPSULE ORAL 2 TIMES DAILY
Status: DISCONTINUED | OUTPATIENT
Start: 2024-09-19 | End: 2024-09-21 | Stop reason: HOSPADM

## 2024-09-19 RX ADMIN — PIPERACILLIN SODIUM AND TAZOBACTAM SODIUM 4.5 G: 4; .5 INJECTION, POWDER, FOR SOLUTION INTRAVENOUS at 05:09

## 2024-09-19 RX ADMIN — RIFAXIMIN 550 MG: 550 TABLET ORAL at 09:09

## 2024-09-19 RX ADMIN — TACROLIMUS 0.5 MG: 0.5 CAPSULE ORAL at 09:09

## 2024-09-19 RX ADMIN — LACTULOSE 20 G: 20 SOLUTION ORAL at 06:09

## 2024-09-19 RX ADMIN — VANCOMYCIN HYDROCHLORIDE 1500 MG: 1.5 INJECTION, POWDER, LYOPHILIZED, FOR SOLUTION INTRAVENOUS at 05:09

## 2024-09-19 RX ADMIN — OXYCODONE HYDROCHLORIDE 15 MG: 10 TABLET ORAL at 08:09

## 2024-09-19 RX ADMIN — DEXTROSE MONOHYDRATE 2 G: 5 INJECTION INTRAVENOUS at 08:09

## 2024-09-19 RX ADMIN — LACTULOSE 20 G: 20 SOLUTION ORAL at 09:09

## 2024-09-19 RX ADMIN — DIAZEPAM 2 MG: 2 TABLET ORAL at 09:09

## 2024-09-19 NOTE — ED NOTES
"Patient identifiers for Jhonny Diana 34 y.o. male checked and correct.  Chief Complaint   Patient presents with    Altered Mental Status     Mom reports pt is "incoherent' x few days.  Hx liver transplant in 2017     Past Medical History:   Diagnosis Date    Abdominal pain 05/27/2018    Alcoholic cirrhosis 08/14/2018    Alcoholic hepatitis with ascites     Anemia     CKD (chronic kidney disease) stage 3, GFR 30-59 ml/min 08/14/2018    Encounter for blood transfusion     multiple blood transfusions    ESRD on dialysis     Herpes zoster without complication 12/11/2019    History of hematemesis 09/28/2017    Hypertension     Seizures     Substance abuse     alcohol and opioids    Thrombocytopenia     Transplanted liver     10/2017     Allergies reported:   Review of patient's allergies indicates:   Allergen Reactions    Bactrim [sulfamethoxazole-trimethoprim] Other (See Comments)     Mookie Trell Syndrome         LOC: Patient is awake, alert, and aware of environment but confused. Patient is oriented to self only and speaking inappropriately.  APPEARANCE: Patient is fidgeting in bed and showing anxiety.   HEENT: - JVD, + midline trach  SKIN: The skin is warm and dry. Patient has normal skin turgor and moist mucus membranes. Scar across abdomen.  MUSKULOSKELETAL: Patient is moving all extremities well, no obvious deformities noted. Pulses intact. PMS x 4  RESPIRATORY: Airway is open and patent. Respirations are spontaneous and non-labored with normal effort and rate. = CBBS  CARDIAC: Patient has a normal rate and rhythm. 77 on cardiac monitor. No peripheral edema noted.   ABDOMEN: No distention noted. Soft and non-tender upon palpation.  NEUROLOGICAL: pupils 4 mm, PERRL. Facial expression is symmetrical. Hand grasps are equal bilaterally. Normal sensation in all extremities when touched with finger.        "

## 2024-09-19 NOTE — ED TRIAGE NOTES
35 y/o M presents to ER with c/c AMS since this morning. Found by mother. Pt arrives unable to answer questions appropriately or follow commands well. Oriented to self only, frequently fidgets, and says inappropriate words. Baseline GCS 15. H/x liver transplant.

## 2024-09-19 NOTE — ED PROVIDER NOTES
"Encounter Date: 9/19/2024       History     Chief Complaint   Patient presents with    Altered Mental Status     Mom reports pt is "incoherent' x few days.  Hx liver transplant in 2017     Patient is a 34 year old male with a past history of alcoholic liver disease s/p liver transplant 2017, ESRD on dialysis, prior seizure, substance abuse including alcohol and opioids presenting to the emergency department for altered mental status. Per the patient's mother, she first noticed a change in his behavior last night when he began acting more agitated, answering everything with "no" and wanting to go to sleep. This morning when he woke up, the patient was not responding appropriately to questions. His mom also noticed some blood on the corner of the patient's mouth. She did not see him fall this morning or last night, but does note that he has fallen before without her noticing it. He did not have any apparent numbness or weakness in his extremities, and his face was not drooping. Mother says that he does take opioid medications currently for chronic pain issues. She had measures his temperature at home and did not notice a fever. He did not endorse any fever, chills, chest pain, SOB, cough, N/V, constipation/diarrhea, dysuria. Endorses abdominal pain, but his mother says that he always endorses diffuse pain including abdominal pain. Of note, his mother said that the last time he acted similar to the way he is acting now he was diagnosed with pneumonia.      The history is provided by the patient.     Review of patient's allergies indicates:   Allergen Reactions    Bactrim [sulfamethoxazole-trimethoprim] Other (See Comments)     Mookie Trell Syndrome     Past Medical History:   Diagnosis Date    Abdominal pain 05/27/2018    Alcoholic cirrhosis 08/14/2018    Alcoholic hepatitis with ascites     Anemia     CKD (chronic kidney disease) stage 3, GFR 30-59 ml/min 08/14/2018    Encounter for blood transfusion     multiple " blood transfusions    ESRD on dialysis     Herpes zoster without complication 2019    History of hematemesis 2017    Hypertension     Seizures     Substance abuse     alcohol and opioids    Thrombocytopenia     Transplanted liver     10/2017     Past Surgical History:   Procedure Laterality Date    APPENDECTOMY      CENTRAL VENOUS CATHETER TUNNELED INSERTION DOUBLE LUMEN      inserted and removed    ERCP      ERCP N/A 2018    Procedure: ERCP;  Surgeon: Solis Villanueva MD;  Location: [facility] ([facility]  Service: Endoscopy;  Laterality: N/A;  dialysis/cirrhosis/labs prior to ERCP/svn  platelet    ESOPHAGOGASTRODUODENOSCOPY      ESOPHAGOGASTRODUODENOSCOPY N/A 2019    Procedure: EGD (ESOPHAGOGASTRODUODENOSCOPY);  Surgeon: Sheri Siddiqui MD;  Location: [facility] ([facility]);  Service: Endoscopy;  Laterality: N/A;    LIVER TRANSPLANT      10/2017     Family History   Problem Relation Name Age of Onset    No Known Problems Mother      Cancer Father          Lung cancer    Alcohol abuse Father      No Known Problems Sister 3     No Known Problems Brother 1     Kidney disease Neg Hx       Social History     Tobacco Use    Smoking status: Former     Current packs/day: 0.00     Average packs/day: 1 pack/day for 10.0 years (10.0 ttl pk-yrs)     Types: Cigarettes     Start date: 2007     Quit date: 2017     Years since quittin.5    Smokeless tobacco: Never   Substance Use Topics    Alcohol use: No     Comment: a fifth of liquor daily for years, cut back over last 2 months    Drug use: No     Review of Systems    Physical Exam     Initial Vitals [24 1119]   BP Pulse Resp Temp SpO2   139/71 85 20 98.9 °F (37.2 °C) 98 %      MAP       --         Physical Exam    Vitals reviewed.  Constitutional: He appears well-developed and well-nourished.   HENT:   Head: Normocephalic and atraumatic.   Eyes: Pupils are equal, round, and reactive to light.   Eyes were dilated, responded to light. No  pinpoint pupils.    Cardiovascular:  Normal rate, regular rhythm, normal heart sounds and intact distal pulses.           No murmur heard.  Pulmonary/Chest: Breath sounds normal. No respiratory distress. He has no wheezes. He has no rales.   Abdominal: Abdomen is soft. Bowel sounds are normal. There is abdominal tenderness (Diffuse.).   Abdomen was mildly distended with fluid.    Musculoskeletal:      Comments: Some bilateral lower extremity/foot edema was noted, +1 non-pitting bilaterally.      Neurological:   Patient was alert and oriented to person and place, not oriented to time. Would not respond appropriately to questions, was easily distractible. Had his hospital gown drawn over his head in the room, kept it that was through part of his initial examination. Was freely moving his limbs around trying to get out of his hospital gown. He did not have any slurring of speech, no facial droop. No numbness.   Was not able to test for flapping tremor on exam. No tremor was observed at rest.    Skin: Skin is warm and dry.   Psychiatric:   Patient was tangential in thinking. Was visibly upset and frustrated with his mother and frustrated in general.          ED Course   Procedures  Labs Reviewed   CBC W/ AUTO DIFFERENTIAL - Abnormal       Result Value    WBC 4.77      RBC 3.37 (*)     Hemoglobin 11.0 (*)     Hematocrit 33.8 (*)      (*)     MCH 32.6 (*)     MCHC 32.5      RDW 12.9      Platelets 70 (*)     MPV 10.2      Immature Granulocytes 0.2      Gran # (ANC) 2.9      Immature Grans (Abs) 0.01      Lymph # 1.2      Mono # 0.6      Eos # 0.1      Baso # 0.02      nRBC 0      Gran % 60.9      Lymph % 26.0      Mono % 11.5      Eosinophil % 1.0      Basophil % 0.4      Differential Method Automated     COMPREHENSIVE METABOLIC PANEL - Abnormal    Sodium 145      Potassium 4.0      Chloride 119 (*)     CO2 16 (*)     Glucose 103      BUN 32 (*)     Creatinine 2.2 (*)     Calcium 9.1      Total Protein 6.3       Albumin 2.7 (*)     Total Bilirubin 2.5 (*)     Alkaline Phosphatase 143 (*)     AST 40      ALT 22      eGFR 39.1 (*)     Anion Gap 10     URINALYSIS, REFLEX TO URINE CULTURE - Abnormal    Specimen UA Urine, Catheterized      Color, UA Yellow      Appearance, UA Clear      pH, UA 7.0      Specific Gravity, UA 1.020      Protein, UA Trace (*)     Glucose, UA Negative      Ketones, UA Negative      Bilirubin (UA) Negative      Occult Blood UA Trace (*)     Nitrite, UA Negative      Leukocytes, UA Negative      Narrative:     Specimen Source->Urine   MAGNESIUM - Abnormal    Magnesium 1.5 (*)    APTT - Abnormal    aPTT 34.5 (*)    DRUG SCREEN PANEL, URINE EMERGENCY - Abnormal    Benzodiazepines Presumptive Positive (*)     Methadone metabolites Negative      Cocaine (Metab.) Negative      Opiate Scrn, Ur Presumptive Positive (*)     Barbiturate Screen, Ur Negative      Amphetamine Screen, Ur Negative      THC Presumptive Positive (*)     Phencyclidine Negative      Creatinine, Urine 183.0      Toxicology Information SEE COMMENT      Narrative:     Specimen Source->Urine   AMMONIA - Abnormal    Ammonia 81 (*)    CULTURE, BLOOD   CULTURE, BLOOD   PROTIME-INR    Prothrombin Time 12.0      INR 1.1     LIPASE    Lipase 14     SARS-COV-2 RNA AMPLIFICATION, QUAL    SARS-CoV-2 RNA, Amplification, Qual Negative     ALCOHOL,MEDICAL (ETHANOL)    Alcohol, Serum <10     SIROLIMUS LEVEL   TACROLIMUS LEVEL   TROPONIN I    Troponin I 0.007     TACROLIMUS LEVEL   SIROLIMUS LEVEL   ISTAT LACTATE    POC Lactate 1.41      Sample VENOUS      Site Other      Allens Test N/A     POCT GLUCOSE    POCT Glucose 96     POCT GLUCOSE MONITORING CONTINUOUS          Imaging Results              CT Abdomen Pelvis  Without Contrast (Final result)  Result time 09/19/24 17:34:00      Final result by Singh La MD (09/19/24 17:34:00)                   Impression:      Moderate to large colonic stool burden may reflect constipation, but to a lesser  degree from 07/27/2024 study.  No convincing evidence of bowel obstruction or acute bowel inflammation.    Otherwise, no acute process seen within the abdomen or pelvis on this noncontrast study.    Continued small right pleural effusion, slightly increased in volume from 07/27/2024 study.    Previous small volume unencapsulated fluid within the anterior midline abdominal wall supraumbilical region is decreased in amount.  Interval decreased fat stranding of the supraumbilical ventral abdominal wall.  No new or enlarging abdominal wall fluid collection.    Grossly stable additional details of chronic and incidental findings, as provided in the body of the report.      Electronically signed by: Singh La MD  Date:    09/19/2024  Time:    17:34               Narrative:    EXAMINATION:  CT ABDOMEN PELVIS WITHOUT CONTRAST    CLINICAL HISTORY:  Abdominal pain, acute, nonlocalized;    TECHNIQUE:  Low dose axial images, sagittal and coronal reformations were obtained from the lung bases to the pubic symphysis.  No contrast media utilized.    COMPARISON:  Chest radiograph same day, CT abdomen and pelvis 07/27/2024, liver transplant ultrasound 01/05/2024, MRI abdomen 03/01/2024, renal ultrasound 12/07/2019    FINDINGS:  Lack of IV contrast limits evaluation of soft tissues and vascular structures.  Beam hardening with streak artifact from overlying monitoring leads somewhat limits evaluation.    Partially imaged overall small volume layering right pleural effusion with associated overlying compressive atelectasis, slightly increased in volume from 07/27/2024 CT study.  No pleural effusion seen on the left.  Few patchy subsegmental opacities within the lingula which appear new from prior.    Base of the heart is normal in size with small volume nonspecific pericardial fluid similar to prior.    Status post liver transplantation.  Grossly unchanged configuration of allograft when compared to prior CT imaging most recently  07/27/2024, allowing for lack of IV contrast.    Cholecystectomy.  No significant biliary ductal dilatation identified.    Spleen remains enlarged similar to prior without focal process.    Noncontrast appearance of the pancreas, stomach, duodenum and bilateral adrenal glands are within normal limits.    Bilateral kidneys are stable in overall size, shape and location.  Grossly similar bilateral mild nonspecific perinephric stranding.  No hydronephrosis.  Subcentimeter hyperdense parenchymal focus at the left renal interpolar region suggesting nonspecific calcification.  No definite calculus seen within the urinary tract.  Ureters are normal in course and caliber.  Urinary bladder is well distended without significant wall thickening.  Prostate and seminal vesicles are within normal limits.  Pelvic phleboliths noted.    Suspected remote appendectomy.  Terminal ileum is within normal limits.  Moderate to large amount of scattered fecal material throughout the colon, to a lesser degree from 07/27/2024 study.  No evidence of bowel obstruction or acute bowel inflammation.    No ascites, free air or lymphadenopathy by CT criteria.  No significant aortic calcific atherosclerosis.  Aorta tapers normally.  Retroaortic left renal vein.    Small un encapsulated fluid within the midline supraumbilical ventral abdominal wall as decreased in size without evidence for organization.  Mild stranding of the midline supraumbilical ventral abdominal wall, but overall improved from prior.  No new or enlarging abdominal wall fluid collection.  Osseous structures appear stable without acute findings.                                       X-Ray Chest AP Portable (Final result)  Result time 09/19/24 15:11:06      Final result by Singh La MD (09/19/24 15:11:06)                   Impression:      Slight interval increased right more than left diffuse nonspecific interstitial coarsening which may reflect pulmonary edema versus  interstitial type pneumonia.  Suspected ongoing/residual small right pleural effusion.  No consolidative process seen.      Electronically signed by: Singh La MD  Date:    09/19/2024  Time:    15:11               Narrative:    EXAMINATION:  XR CHEST AP PORTABLE    CLINICAL HISTORY:  Sepsis;    TECHNIQUE:  Single frontal view of the chest was performed.    COMPARISON:  Chest radiograph and CT abdomen and pelvis 07/27/2024; chest CT 05/15/2018    FINDINGS:  Monitoring leads overlie the chest.  Patient is somewhat rotated    Cardiomediastinal silhouette is midline and within normal limits for age.  Trachea is midline and patent.    The lungs are well expanded.  Bilateral diffuse nonspecific interstitial coarsening increased from prior and slightly more so on the right.  Slight blunting of the right costophrenic angle suggesting ongoing/residual small right pleural effusion.  No sizable pleural effusion on the left.  No consolidation or pneumothorax identified.  Hilar contours are unchanged.  No acute osseous process seen.  PA and lateral views can be obtained.                                       CT Head Without Contrast (Final result)  Result time 09/19/24 12:42:48      Final result by Anoop Damico MD (09/19/24 12:42:48)                   Impression:      No evidence of acute hemorrhage or major vascular distribution infarct.      Electronically signed by: Anoop Damico MD  Date:    09/19/2024  Time:    12:42               Narrative:    EXAMINATION:  CT HEAD WITHOUT CONTRAST    CLINICAL HISTORY:  Mental status change, unknown cause;    TECHNIQUE:  Low dose axial CT images obtained throughout the head without the use of intravenous contrast.  Axial, sagittal and coronal reconstructions were performed.    COMPARISON:  None.    FINDINGS:  Intracranial compartment:    Ventricles are stable in size, without evidence of hydrocephalus.    Brain appears unchanged. No new parenchymal hemorrhage, edema, mass effect  or major vascular distribution infarct.    No new extra-axial blood or fluid collections.    Skull/extracranial contents (limited evaluation):    No displaced calvarial fracture.    Mastoid air cells are clear. Mild patchy mucosal thickening in the visualized paranasal sinuses.                                       Medications   vancomycin 1,500 mg in D5W 250 mL IVPB (admixture device) (1,500 mg Intravenous New Bag 9/19/24 1742)   lactulose 20 gram/30 mL solution Soln 20 g (has no administration in time range)   piperacillin-tazobactam (ZOSYN) 4.5 g in D5W 100 mL IVPB (MB+) (0 g Intravenous Stopped 9/19/24 1740)     Medical Decision Making  Patient is a 34 year old male presenting for altered mental status. Given his history of liver transplant and given that he is not on lactulose or rifaximin, hepatic encephalopathy must be considered. Infection is also being considered, given his history of altered mental status with prior infection. With his liver and kidney issues, electrolyte derangements could be contributing to his altered mental status. Hypoglycemia is another potential cause of his altered mental status. While seizure is less likely, it needs to be considered given that the patient had a bloody mouth when found this morning, has had a seizure in the past, and is not on antiepileptic medications. With his history of unwitnessed falls, brain bleeding such as a subdural hematoma needs to be ruled out. Stroke is less likely due to the more extended time course of symptoms and lack of numbness, weakness, facial droop or other stroke symptoms.     ED management/plan:   - CT head without contrast did not show any acute abnormalities   - CXR demonstrated slight interval increased right more than left diffuse nonspecific interstitial coarsening which may reflect pulmonary edema versus interstitial type pneumonia  - Lactate, PCOT glucose, lipase, PT-INR were normal   - aPTT was high at 34.5  - COVID negative   - CBC  showed stable anemia, low platelets   - CMP showed acute rise in creatinine from baseline, no significant electrolyte derangements   - Ammonia levels were elevated at 81   - Urinalysis showed trace proteinuria and hematuria   - Drug screen showed presumptive positive for THC, opioids, benzodiazepines. Ethanol level was normal.   - CT abdomen pelvis without contrast showed no acute changes     Given the laboratory findings, it is likely that the patient's altered mental status is secondary to hepatic encephalopathy, was started on lactulose in the ED. Lab values also demonstrated a potential acute kidney injury with a rise in creatinine to 2.2. CXR showed signs of potential pneumonia, was started on broad spectrum antibiotics in the ED. Plan to admit to the hospital for management of hepatic encephalopathy and potential pneumonia.     Amount and/or Complexity of Data Reviewed  Labs: ordered.  Radiology: ordered. Decision-making details documented in ED Course.    Risk  Prescription drug management.  Decision regarding hospitalization.               ED Course as of 09/19/24 1817   Thu Sep 19, 2024   1152 34-year-old male past medical history of hypertension and liver transplant on 10/19/17 secondary to acute alcohol hepatitis with multiple postop complications including biliary stenosis s/p multiple ERCPs, adhesions, abdominal infections and prolonged LAURA requiring dialysis presents today with altered mental status x2 days.  Last seen and liver transplant hepatology clinic on 08/21/24.  Currently on immunosuppressants with tacrolimus and sirolimus as well as steroids.  Afebrile.  Vital signs reassuring. [BD]   1623 X-Ray Chest AP Portable  Impression:     Slight interval increased right more than left diffuse nonspecific interstitial coarsening which may reflect pulmonary edema versus interstitial type pneumonia.  Suspected ongoing/residual small right pleural effusion.  No consolidative process seen.         Electronically signed by:Singh La MD  Date:                                            09/19/2024  Time:                                           15:11   [BD]   1623 CT Head Without Contrast [BD]   1623 Chest x-ray with pneumonia versus edema on my individual interpretation. [BD]   1623 CT head negative for intracranial bleed on my read. [BD]      ED Course User Index  [BD] Leobardo Sorto MD                           Clinical Impression:  Final diagnoses:  [R41.82] Altered mental status  [R07.9] Chest pain          ED Disposition Condition    Admit                 Alin Paul MD  Resident  09/19/24 0885

## 2024-09-20 LAB
ALBUMIN SERPL BCP-MCNC: 2.4 G/DL (ref 3.5–5.2)
ALP SERPL-CCNC: 126 U/L (ref 55–135)
ALT SERPL W/O P-5'-P-CCNC: 18 U/L (ref 10–44)
ANION GAP SERPL CALC-SCNC: 10 MMOL/L (ref 8–16)
AST SERPL-CCNC: 39 U/L (ref 10–40)
BASOPHILS # BLD AUTO: 0.02 K/UL (ref 0–0.2)
BASOPHILS NFR BLD: 0.4 % (ref 0–1.9)
BILIRUB SERPL-MCNC: 2.2 MG/DL (ref 0.1–1)
BUN SERPL-MCNC: 29 MG/DL (ref 6–20)
CALCIUM SERPL-MCNC: 8.6 MG/DL (ref 8.7–10.5)
CHLORIDE SERPL-SCNC: 116 MMOL/L (ref 95–110)
CO2 SERPL-SCNC: 16 MMOL/L (ref 23–29)
CREAT SERPL-MCNC: 2 MG/DL (ref 0.5–1.4)
CRP SERPL-MCNC: 18.2 MG/L (ref 0–8.2)
DIFFERENTIAL METHOD BLD: ABNORMAL
EOSINOPHIL # BLD AUTO: 0.1 K/UL (ref 0–0.5)
EOSINOPHIL NFR BLD: 1.3 % (ref 0–8)
ERYTHROCYTE [DISTWIDTH] IN BLOOD BY AUTOMATED COUNT: 13 % (ref 11.5–14.5)
ERYTHROCYTE [SEDIMENTATION RATE] IN BLOOD BY PHOTOMETRIC METHOD: 34 MM/HR (ref 0–23)
EST. GFR  (NO RACE VARIABLE): 43.8 ML/MIN/1.73 M^2
GLUCOSE SERPL-MCNC: 82 MG/DL (ref 70–110)
HCT VFR BLD AUTO: 31.5 % (ref 40–54)
HGB BLD-MCNC: 10.2 G/DL (ref 14–18)
IMM GRANULOCYTES # BLD AUTO: 0.01 K/UL (ref 0–0.04)
IMM GRANULOCYTES NFR BLD AUTO: 0.2 % (ref 0–0.5)
INR PPP: 1.1 (ref 0.8–1.2)
LYMPHOCYTES # BLD AUTO: 1.5 K/UL (ref 1–4.8)
LYMPHOCYTES NFR BLD: 33.7 % (ref 18–48)
MAGNESIUM SERPL-MCNC: 1.4 MG/DL (ref 1.6–2.6)
MCH RBC QN AUTO: 33.3 PG (ref 27–31)
MCHC RBC AUTO-ENTMCNC: 32.4 G/DL (ref 32–36)
MCV RBC AUTO: 103 FL (ref 82–98)
MONOCYTES # BLD AUTO: 0.6 K/UL (ref 0.3–1)
MONOCYTES NFR BLD: 12 % (ref 4–15)
NEUTROPHILS # BLD AUTO: 2.4 K/UL (ref 1.8–7.7)
NEUTROPHILS NFR BLD: 52.4 % (ref 38–73)
NRBC BLD-RTO: 0 /100 WBC
OHS QRS DURATION: 92 MS
OHS QTC CALCULATION: 453 MS
PHOSPHATE SERPL-MCNC: 3.3 MG/DL (ref 2.7–4.5)
PLATELET # BLD AUTO: 62 K/UL (ref 150–450)
PMV BLD AUTO: 10.5 FL (ref 9.2–12.9)
POCT GLUCOSE: 82 MG/DL (ref 70–110)
POTASSIUM SERPL-SCNC: 4.1 MMOL/L (ref 3.5–5.1)
PROT SERPL-MCNC: 5.8 G/DL (ref 6–8.4)
PROTHROMBIN TIME: 12.3 SEC (ref 9–12.5)
RBC # BLD AUTO: 3.06 M/UL (ref 4.6–6.2)
SIROLIMUS BLD-MCNC: 7.9 NG/ML (ref 4–20)
SODIUM SERPL-SCNC: 142 MMOL/L (ref 136–145)
TACROLIMUS BLD-MCNC: 7.1 NG/ML (ref 5–15)
TACROLIMUS BLD-MCNC: 9.1 NG/ML (ref 5–15)
WBC # BLD AUTO: 4.57 K/UL (ref 3.9–12.7)

## 2024-09-20 PROCEDURE — 85652 RBC SED RATE AUTOMATED: CPT

## 2024-09-20 PROCEDURE — 80053 COMPREHEN METABOLIC PANEL: CPT

## 2024-09-20 PROCEDURE — 63600175 PHARM REV CODE 636 W HCPCS

## 2024-09-20 PROCEDURE — 36415 COLL VENOUS BLD VENIPUNCTURE: CPT

## 2024-09-20 PROCEDURE — 20600001 HC STEP DOWN PRIVATE ROOM

## 2024-09-20 PROCEDURE — 25000003 PHARM REV CODE 250

## 2024-09-20 PROCEDURE — 99223 1ST HOSP IP/OBS HIGH 75: CPT | Mod: ,,, | Performed by: INTERNAL MEDICINE

## 2024-09-20 PROCEDURE — 36415 COLL VENOUS BLD VENIPUNCTURE: CPT | Mod: XB

## 2024-09-20 PROCEDURE — 87529 HSV DNA AMP PROBE: CPT

## 2024-09-20 PROCEDURE — 85610 PROTHROMBIN TIME: CPT

## 2024-09-20 PROCEDURE — 80197 ASSAY OF TACROLIMUS: CPT

## 2024-09-20 PROCEDURE — 83735 ASSAY OF MAGNESIUM: CPT

## 2024-09-20 PROCEDURE — 84100 ASSAY OF PHOSPHORUS: CPT

## 2024-09-20 PROCEDURE — 85025 COMPLETE CBC W/AUTO DIFF WBC: CPT

## 2024-09-20 PROCEDURE — 86140 C-REACTIVE PROTEIN: CPT

## 2024-09-20 RX ORDER — LACTULOSE 10 G/15ML
200 SOLUTION ORAL; RECTAL ONCE
Status: DISCONTINUED | OUTPATIENT
Start: 2024-09-20 | End: 2024-09-20

## 2024-09-20 RX ORDER — MAGNESIUM SULFATE HEPTAHYDRATE 40 MG/ML
2 INJECTION, SOLUTION INTRAVENOUS
Status: COMPLETED | OUTPATIENT
Start: 2024-09-20 | End: 2024-09-20

## 2024-09-20 RX ORDER — LACTULOSE 10 G/15ML
15 SOLUTION ORAL EVERY 4 HOURS
Status: DISPENSED | OUTPATIENT
Start: 2024-09-20 | End: 2024-09-21

## 2024-09-20 RX ORDER — PSEUDOEPHEDRINE/ACETAMINOPHEN 30MG-500MG
100 TABLET ORAL
Status: DISCONTINUED | OUTPATIENT
Start: 2024-09-20 | End: 2024-09-20

## 2024-09-20 RX ORDER — SYRING-NEEDL,DISP,INSUL,0.3 ML 29 G X1/2"
296 SYRINGE, EMPTY DISPOSABLE MISCELLANEOUS
Status: DISCONTINUED | OUTPATIENT
Start: 2024-09-20 | End: 2024-09-20

## 2024-09-20 RX ADMIN — RIFAXIMIN 550 MG: 550 TABLET ORAL at 08:09

## 2024-09-20 RX ADMIN — LACTULOSE 15 G: 20 SOLUTION ORAL at 01:09

## 2024-09-20 RX ADMIN — LACTULOSE 15 G: 20 SOLUTION ORAL at 06:09

## 2024-09-20 RX ADMIN — LACTULOSE 20 G: 20 SOLUTION ORAL at 08:09

## 2024-09-20 RX ADMIN — OXYCODONE HYDROCHLORIDE 15 MG: 10 TABLET ORAL at 01:09

## 2024-09-20 RX ADMIN — TACROLIMUS 0.5 MG: 0.5 CAPSULE ORAL at 06:09

## 2024-09-20 RX ADMIN — SIROLIMUS 1 MG: 1 TABLET ORAL at 08:09

## 2024-09-20 RX ADMIN — OXYCODONE HYDROCHLORIDE 15 MG: 10 TABLET ORAL at 08:09

## 2024-09-20 RX ADMIN — MAGNESIUM SULFATE HEPTAHYDRATE 2 G: 40 INJECTION, SOLUTION INTRAVENOUS at 02:09

## 2024-09-20 RX ADMIN — LACTULOSE 15 G: 20 SOLUTION ORAL at 08:09

## 2024-09-20 RX ADMIN — PANTOPRAZOLE SODIUM 40 MG: 40 INJECTION, POWDER, FOR SOLUTION INTRAVENOUS at 08:09

## 2024-09-20 RX ADMIN — OXYCODONE HYDROCHLORIDE 15 MG: 10 TABLET ORAL at 02:09

## 2024-09-20 RX ADMIN — MAGNESIUM SULFATE HEPTAHYDRATE 2 G: 40 INJECTION, SOLUTION INTRAVENOUS at 10:09

## 2024-09-20 RX ADMIN — DEXTROSE MONOHYDRATE 2 G: 5 INJECTION INTRAVENOUS at 08:09

## 2024-09-20 RX ADMIN — TACROLIMUS 0.5 MG: 0.5 CAPSULE ORAL at 08:09

## 2024-09-20 NOTE — ED NOTES
Report received from SISSY Vazquez. Assume care of pt. Pt resting comfortably and independently repositioned in stretcher with bed locked in lowest position for safety. NAD noted at this time. Respirations even and unlabored and visible chest rise noted.  Patient offered bathroom assistance and denies need at this time. Pt instructed to call if assistance is needed. Pt on continuous cardiac, BP, and O2 monitoring. Call light within reach. Will continue to monitor.  Pt requesting pain medicine.

## 2024-09-20 NOTE — ASSESSMENT & PLAN NOTE
The likely etiology of thrombocytopenia is liver disease. The patients 3 most recent labs are listed below.  Recent Labs     09/19/24  1226   PLT 70*     Plan  - Will transfuse if platelet count is <10k.

## 2024-09-20 NOTE — PROGRESS NOTES
[facility] - Stepdown [facility] ([place] [address])  [facility] Medicine  Progress Note    Patient Name: Jhonny Diana  MRN: [id number]  Patient Class: IP- Inpatient   Admission Date: 9/19/2024  Length of Stay: 1 days  Attending Physician: Antony Vasquez MD  Primary Care Provider: Elo Bull MD        Subjective:     Principal Problem:Hepatic encephalopathy        HPI:  Mr. Diana is a 34 year-old male with a pmhx of history of liver disease s/p liver transplant 2017 on tacrolimus .5 mg BID and sirolimus 1mg qd, ESRD on dialysis last dialysis in 2018, presenting to the ED with AMS. Change started last night (9/18), mother noticed he would not respond appropriately to questions 9/19 AM, noted that the last time this happened he came into the hospital with pneumonia.  The patient states he was preparing for surgery to repair a hernia, and he was on 1mg tacrolimus and two nights ago started .5 mg tacrolimus. He describes diffuse pain throughout limbs. His mother was at bedside and administers his medications. Patient states that he was recently discontinued on prednisone after there was concern for rejection. She does not endorse any missed medications or taking additional doses of medications. There are no sick contacts and recently traveled to [place] to see a friend a few weeks ago.         ED initial workup:  CBC: WBC 4.77 H/H: 11/33.8, platelets: 70  CMP: 145, 4, 119, 16, 32/2.2 (baseline 1.7), tbili: 2.5 , elevated Alk phos 143, normal AST/ALT  Magnesium: 1.5  Ammonia: 81  Blood cultures drawn   Drug screen: + benzodiazepines, opiates, THC  UA: trace protein, trace occult blood  Tacrolimus level ordered    Imaging:     CT abd pelvis: Moderate to large colonic stool burden may reflect constipation, but to a lesser degree from 07/27/2024 study.  No convincing evidence of bowel obstruction or acute bowel inflammation. Continued small right pleural effusion, slightly increased in volume from 07/27/2024 study.  Previous small volume unencapsulated fluid within the anterior midline abdominal wall supraumbilical region is decreased in amount.  Interval decreased fat stranding of the supraumbilical ventral abdominal wall.     Cxr: Slight interval increased right more than left diffuse nonspecific interstitial coarsening which may reflect pulmonary edema versus interstitial type pneumonia. Suspected ongoing/residual small right pleural effusion. No consolidative process seen.     Ct head w/o contrast:No evidence of acute hemorrhage or major vascular distribution infarct.     Started on vancomycin, zosyn, and lactulose.       Overview/Hospital Course:  No notes on file    Interval History: NAEO. 1 BM 9/20.     Review of Systems   Respiratory:  Negative for chest tightness and shortness of breath.    Gastrointestinal:  Negative for nausea.   Musculoskeletal:  Positive for myalgias.   Neurological:  Positive for speech difficulty.   Psychiatric/Behavioral:  Positive for agitation and confusion.      Objective:     Vital Signs (Most Recent):  Temp: 98 °F (36.7 °C) (09/20/24 1558)  Pulse: 68 (09/20/24 1558)  Resp: 12 (09/20/24 1558)  BP: 119/63 (09/20/24 1558)  SpO2: 97 % (09/20/24 1558) Vital Signs (24h Range):  Temp:  [98 °F (36.7 °C)-99.7 °F (37.6 °C)] 98 °F (36.7 °C)  Pulse:  [68-90] 68  Resp:  [12-24] 12  SpO2:  [94 %-99 %] 97 %  BP: (113-143)/(57-75) 119/63     Weight: 79.4 kg (175 lb)  Body mass index is 28.25 kg/m².    Intake/Output Summary (Last 24 hours) at 9/20/2024 1644  Last data filed at 9/19/2024 2115  Gross per 24 hour   Intake 445.33 ml   Output --   Net 445.33 ml         Physical Exam  Constitutional:       Appearance: He is ill-appearing and toxic-appearing.   Cardiovascular:      Rate and Rhythm: Normal rate and regular rhythm.   Pulmonary:      Effort: Pulmonary effort is normal.   Abdominal:      General: There is distension.      Tenderness: There is abdominal tenderness. There is guarding.   Musculoskeletal:          General: Tenderness present.      Right lower leg: Edema present.      Left lower leg: Edema present.      Right foot: Swelling present.      Left foot: Swelling present.      Comments: Pronounced arches, swelling in feet   Skin:     General: Skin is warm.   Neurological:      Mental Status: He is alert. He is disoriented.             Significant Labs: All pertinent labs within the past 24 hours have been reviewed.  CBC:   Recent Labs   Lab 09/19/24  1226 09/20/24  0506   WBC 4.77 4.57   HGB 11.0* 10.2*   HCT 33.8* 31.5*   PLT 70* 62*     CMP:   Recent Labs   Lab 09/19/24  1226 09/20/24  0506    142   K 4.0 4.1   * 116*   CO2 16* 16*    82   BUN 32* 29*   CREATININE 2.2* 2.0*   CALCIUM 9.1 8.6*   PROT 6.3 5.8*   ALBUMIN 2.7* 2.4*   BILITOT 2.5* 2.2*   ALKPHOS 143* 126   AST 40 39   ALT 22 18   ANIONGAP 10 10       Significant Imaging: I have reviewed all pertinent imaging results/findings within the past 24 hours.    Assessment/Plan:      * Hepatic encephalopathy  Hepatic encephalopathy may be secondary to constipation or PVT given pain in RUQ. Ammonia elevated at 81 and CT showed moderate to large stool burden.    - lactulose  20 g TID  - rifaximin 550 mg BID  - ceftriaxone 2 mg qd  - hepatology consulted:   - Recommend broad workup for other causes of encephalopathy, and agree with infectious workup.  - Ordered HSV and CMV DNA serologies, will follow-up.  - Follow-up PETH.  - Continue Lactulose and consider enema to facilitate bowel movement in setting of visualized and reported constipation.  - Continue Tacrolimus 0.5 mg BID and Sirolimus 1mg QD.   - Please obtain daily CBC, CMP,  PT/INR, and Tacro levels.        Chronic pain  Ongoing chronic pain in right abdomen and in back. Home medication ordered for oxycodone TID.     - oxycodone 15 mg PRN      Thrombocytopenia  The likely etiology of thrombocytopenia is liver disease. The patients 3 most recent labs are listed below.  Recent Labs      09/19/24  1226   PLT 70*     Plan  - Will transfuse if platelet count is <10k.      History of liver transplant  - continued sirolimus 1 mg  - continued tacrolimus .5 mg BID   - consult hepatology for recommendations         VTE Risk Mitigation (From admission, onward)           Ordered     IP VTE LOW RISK PATIENT  Once         09/19/24 1834     Place sequential compression device  Until discontinued         09/19/24 1834                    Discharge Planning   DERICK: 9/22/2024     Code Status: Full Code   Is the patient medically ready for discharge?:     Reason for patient still in hospital (select all that apply): Patient trending condition and Treatment                     Yamila Carter MD  Department of Hospital Medicine   [facility] - [facility] ([place]-[facility])

## 2024-09-20 NOTE — SUBJECTIVE & OBJECTIVE
Past Medical History:   Diagnosis Date    Abdominal pain 05/27/2018    Alcoholic cirrhosis 08/14/2018    Alcoholic hepatitis with ascites     Anemia     CKD (chronic kidney disease) stage 3, GFR 30-59 ml/min 08/14/2018    Encounter for blood transfusion     multiple blood transfusions    ESRD on dialysis     Herpes zoster without complication 12/11/2019    History of hematemesis 09/28/2017    Hypertension     Seizures     Substance abuse     alcohol and opioids    Thrombocytopenia     Transplanted liver     10/2017       Past Surgical History:   Procedure Laterality Date    APPENDECTOMY      CENTRAL VENOUS CATHETER TUNNELED INSERTION DOUBLE LUMEN      inserted and removed    ERCP      ERCP N/A 7/2/2018    Procedure: ERCP;  Surgeon: Soils Villanueva MD;  Location: [facility] ENDO (2ND FLR);  Service: Endoscopy;  Laterality: N/A;  dialysis/cirrhosis/labs prior to ERCP/svn  platelet    ESOPHAGOGASTRODUODENOSCOPY      ESOPHAGOGASTRODUODENOSCOPY N/A 11/25/2019    Procedure: EGD (ESOPHAGOGASTRODUODENOSCOPY);  Surgeon: Sheri Siddiqui MD;  [facility]  Service: Endoscopy;  Laterality: N/A;    LIVER TRANSPLANT      10/2017       Review of patient's allergies indicates:   Allergen Reactions    Bactrim [sulfamethoxazole-trimethoprim] Other (See Comments)     Mookie Trell Syndrome       No current facility-administered medications on file prior to encounter.     Current Outpatient Medications on File Prior to Encounter   Medication Sig    diazePAM (VALIUM) 2 MG tablet Take 2 mg by mouth 2 (two) times a day.    methylnaltrexone (RELISTOR) 150 mg Tab  (Patient not taking: Reported on 8/21/2024)    naloxone (NARCAN) 4 mg/actuation Spry CALL 911. SPR CONTENTS OF ONE SPRAYER (0.1ML) INTO ONE NOSTRIL. REPEAT IN 2-3 MIN IF SYMPTOMS OF OPIOID EMERGENCY PERSIST, ALTERNATE NOSTRILS (Patient not taking: Reported on 8/21/2024)    oxyCODONE (ROXICODONE) 15 MG Tab Take 10 mg by mouth 3 (three) times daily.    predniSONE  (DELTASONE) 10 MG tablet Take 4 tablets (40 mg total) by mouth once daily. (Patient taking differently: Take 20 mg by mouth once daily. 2 tabs a day)    sirolimus (RAPAMUNE) 1 MG Tab Take 1 tablet (1 mg total) by mouth once daily.    tacrolimus (PROGRAF) 0.5 MG Cap Take 1 capsule (0.5 mg total) by mouth every 12 (twelve) hours.    tiZANidine 4 mg Cap Take 1 tablet by mouth daily as needed.    torsemide (DEMADEX) 10 MG Tab     valACYclovir (VALTREX) 500 MG tablet Take by mouth once daily.     Family History       Problem Relation (Age of Onset)    Alcohol abuse Father    Cancer Father    No Known Problems Mother, Sister, Brother          Tobacco Use    Smoking status: Former     Current packs/day: 0.00     Average packs/day: 1 pack/day for 10.0 years (10.0 ttl pk-yrs)     Types: Cigarettes     Start date: 2007     Quit date: 2017     Years since quittin.5    Smokeless tobacco: Never   Substance and Sexual Activity    Alcohol use: No     Comment: a fifth of liquor daily for years, cut back over last 2 months    Drug use: No    Sexual activity: Not Currently     Partners: Male     Comment: single     Review of Systems   Respiratory:  Negative for chest tightness and shortness of breath.    Gastrointestinal:  Positive for nausea.   Musculoskeletal:  Positive for myalgias.   Neurological:  Positive for speech difficulty.   Psychiatric/Behavioral:  Positive for agitation and confusion.      Objective:     Vital Signs (Most Recent):  Temp: 99.7 °F (37.6 °C) (24)  Pulse: 90 (24)  Resp: 20 (24)  BP: 123/67 (24)  SpO2: 95 % (24) Vital Signs (24h Range):  Temp:  [98.5 °F (36.9 °C)-99.7 °F (37.6 °C)] 99.7 °F (37.6 °C)  Pulse:  [77-90] 90  Resp:  [18-24] 20  SpO2:  [94 %-98 %] 95 %  BP: (113-142)/(57-83) 123/67     Weight: 79.4 kg (175 lb)  Body mass index is 28.25 kg/m².     Physical Exam  Constitutional:       Appearance: He is ill-appearing and  toxic-appearing.   Cardiovascular:      Rate and Rhythm: Normal rate and regular rhythm.   Pulmonary:      Effort: Pulmonary effort is normal.   Abdominal:      General: There is distension.      Tenderness: There is abdominal tenderness. There is guarding.   Musculoskeletal:         General: Tenderness present.      Right lower leg: Edema present.      Left lower leg: Edema present.   Skin:     General: Skin is warm.   Neurological:      Mental Status: He is alert. He is disoriented.                Significant Labs: All pertinent labs within the past 24 hours have been reviewed.    Significant Imaging: I have reviewed all pertinent imaging results/findings within the past 24 hours.

## 2024-09-20 NOTE — H&P
"  [facility] - Emergency Dept  [facility] Medicine  History & Physical    Patient Name: Jhonny Diana  MRN: [id number]  Patient Class: IP- Inpatient  Admission Date: 9/19/2024  Attending Physician: Antony Vasquez MD   Primary Care Provider: Elo Bull MD         Patient information was obtained from parent and ER records.     Subjective:     Principal Problem:Hepatic encephalopathy    Chief Complaint:   Chief Complaint   Patient presents with    Altered Mental Status     Mom reports pt is "incoherent' x few days.  Hx liver transplant in 2017        HPI: Mr. Diana is a 34 year-old male with a pmhx of history of liver disease s/p liver transplant 2017 on tacrolimus .5 mg BID and sirolimus 1mg qd, ESRD on dialysis last dialysis in 2018, presenting to the ED with AMS. Change started last night (9/18), mother noticed he would not respond appropriately to questions 9/19 AM, noted that the last time this happened he came into the hospital with pneumonia.  The patient states he was preparing for surgery to repair a hernia, and he was on 1mg tacrolimus and two nights ago started .5 mg tacrolimus. He describes diffuse pain throughout limbs. His mother was at bedside and administers his medications. Patient states that he was recently discontinued on prednisone after there was concern for rejection. She does not endorse any missed medications or taking additional doses of medications. There are no sick contacts and recently traveled to [place] to see a friend a few weeks ago.         ED initial workup:  CBC: WBC 4.77 H/H: 11/33.8, platelets: 70  CMP: 145, 4, 119, 16, 32/2.2 (baseline 1.7), tbili: 2.5 , elevated Alk phos 143, normal AST/ALT  Magnesium: 1.5  Ammonia: 81  Blood cultures drawn   Drug screen: + benzodiazepines, opiates, THC  UA: trace protein, trace occult blood  Tacrolimus level ordered    Imaging:     CT abd pelvis: Moderate to large colonic stool burden may reflect constipation, but to a lesser " degree from 07/27/2024 study.  No convincing evidence of bowel obstruction or acute bowel inflammation. Continued small right pleural effusion, slightly increased in volume from 07/27/2024 study. Previous small volume unencapsulated fluid within the anterior midline abdominal wall supraumbilical region is decreased in amount.  Interval decreased fat stranding of the supraumbilical ventral abdominal wall.     Cxr: Slight interval increased right more than left diffuse nonspecific interstitial coarsening which may reflect pulmonary edema versus interstitial type pneumonia. Suspected ongoing/residual small right pleural effusion. No consolidative process seen.     Ct head w/o contrast:No evidence of acute hemorrhage or major vascular distribution infarct.     Started on vancomycin, zosyn, and lactulose.       Past Medical History:   Diagnosis Date    Abdominal pain 05/27/2018    Alcoholic cirrhosis 08/14/2018    Alcoholic hepatitis with ascites     Anemia     CKD (chronic kidney disease) stage 3, GFR 30-59 ml/min 08/14/2018    Encounter for blood transfusion     multiple blood transfusions    ESRD on dialysis     Herpes zoster without complication 12/11/2019    History of hematemesis 09/28/2017    Hypertension     Seizures     Substance abuse     alcohol and opioids    Thrombocytopenia     Transplanted liver     10/2017       Past Surgical History:   Procedure Laterality Date    APPENDECTOMY      CENTRAL VENOUS CATHETER TUNNELED INSERTION DOUBLE LUMEN      inserted and removed    ERCP      ERCP N/A 7/2/2018    Procedure: ERCP;  Surgeon: Solis Villanueva MD;  Location: [facility] ([address] [place] [address]);  Service: Endoscopy;  Laterality: N/A;  dialysis/cirrhosis/labs prior to ERCP/svn  platelet    ESOPHAGOGASTRODUODENOSCOPY      ESOPHAGOGASTRODUODENOSCOPY N/A 11/25/2019    Procedure: EGD (ESOPHAGOGASTRODUODENOSCOPY);  Surgeon: Sheri Siddiqui MD;  Location: [facility] ([address] [place] [address]);  Service: Endoscopy;  Laterality: N/A;    LIVER TRANSPLANT       10/2017       Review of patient's allergies indicates:   Allergen Reactions    Bactrim [sulfamethoxazole-trimethoprim] Other (See Comments)     Mookie Trell Syndrome       No current facility-administered medications on file prior to encounter.     Current Outpatient Medications on File Prior to Encounter   Medication Sig    diazePAM (VALIUM) 2 MG tablet Take 2 mg by mouth 2 (two) times a day.    methylnaltrexone (RELISTOR) 150 mg Tab  (Patient not taking: Reported on 2024)    naloxone (NARCAN) 4 mg/actuation Spry CALL 911. SPR CONTENTS OF ONE SPRAYER (0.1ML) INTO ONE NOSTRIL. REPEAT IN 2-3 MIN IF SYMPTOMS OF OPIOID EMERGENCY PERSIST, ALTERNATE NOSTRILS (Patient not taking: Reported on 2024)    oxyCODONE (ROXICODONE) 15 MG Tab Take 10 mg by mouth 3 (three) times daily.    predniSONE (DELTASONE) 10 MG tablet Take 4 tablets (40 mg total) by mouth once daily. (Patient taking differently: Take 20 mg by mouth once daily. 2 tabs a day)    sirolimus (RAPAMUNE) 1 MG Tab Take 1 tablet (1 mg total) by mouth once daily.    tacrolimus (PROGRAF) 0.5 MG Cap Take 1 capsule (0.5 mg total) by mouth every 12 (twelve) hours.    tiZANidine 4 mg Cap Take 1 tablet by mouth daily as needed.    torsemide (DEMADEX) 10 MG Tab     valACYclovir (VALTREX) 500 MG tablet Take by mouth once daily.     Family History       Problem Relation (Age of Onset)    Alcohol abuse Father    Cancer Father    No Known Problems Mother, Sister, Brother          Tobacco Use    Smoking status: Former     Current packs/day: 0.00     Average packs/day: 1 pack/day for 10.0 years (10.0 ttl pk-yrs)     Types: Cigarettes     Start date: 2007     Quit date: 2017     Years since quittin.5    Smokeless tobacco: Never   Substance and Sexual Activity    Alcohol use: No     Comment: a fifth of liquor daily for years, cut back over last 2 months    Drug use: No    Sexual activity: Not Currently     Partners: Male     Comment: single     Review of  Systems   Respiratory:  Negative for chest tightness and shortness of breath.    Gastrointestinal:  Positive for nausea.   Musculoskeletal:  Positive for myalgias.   Neurological:  Positive for speech difficulty.   Psychiatric/Behavioral:  Positive for agitation and confusion.      Objective:     Vital Signs (Most Recent):  Temp: 99.7 °F (37.6 °C) (09/19/24 1900)  Pulse: 90 (09/19/24 1900)  Resp: 20 (09/19/24 1900)  BP: 123/67 (09/19/24 1900)  SpO2: 95 % (09/19/24 1900) Vital Signs (24h Range):  Temp:  [98.5 °F (36.9 °C)-99.7 °F (37.6 °C)] 99.7 °F (37.6 °C)  Pulse:  [77-90] 90  Resp:  [18-24] 20  SpO2:  [94 %-98 %] 95 %  BP: (113-142)/(57-83) 123/67     Weight: 79.4 kg (175 lb)  Body mass index is 28.25 kg/m².     Physical Exam  Constitutional:       Appearance: He is ill-appearing and toxic-appearing.   Cardiovascular:      Rate and Rhythm: Normal rate and regular rhythm.   Pulmonary:      Effort: Pulmonary effort is normal.   Abdominal:      General: There is distension.      Tenderness: There is abdominal tenderness. There is guarding.   Musculoskeletal:         General: Tenderness present.      Right lower leg: Edema present.      Left lower leg: Edema present.   Skin:     General: Skin is warm.   Neurological:      Mental Status: He is alert. He is disoriented.                Significant Labs: All pertinent labs within the past 24 hours have been reviewed.    Significant Imaging: I have reviewed all pertinent imaging results/findings within the past 24 hours.  Assessment/Plan:     * Hepatic encephalopathy    - lactulose  20 g TID  - rifaximin 550 mg BID  - ceftriaxone 2 mg qd    Chronic pain  Ongoing chronic pain in right abdomen and in back. Home medication ordered for oxycodone TID.     - oxycodone 15 mg PRN      Thrombocytopenia  The likely etiology of thrombocytopenia is liver disease. The patients 3 most recent labs are listed below.  Recent Labs     09/19/24  1226   PLT 70*     Plan  - Will transfuse if  platelet count is <10k.      History of liver transplant  - continued sirolimus 1 mg  - continued tacrolimus .5 mg BID         VTE Risk Mitigation (From admission, onward)           Ordered     IP VTE LOW RISK PATIENT  Once         09/19/24 1834     Place sequential compression device  Until discontinued         09/19/24 1834                                    Yamila Carter MD  Department of Hospital Medicine  [facility] - Emergency Dept

## 2024-09-20 NOTE — ASSESSMENT & PLAN NOTE
Hepatic encephalopathy may be secondary to constipation or PVT given pain in RUQ. Ammonia elevated at 81 and CT showed moderate to large stool burden.    - lactulose  20 g TID  - rifaximin 550 mg BID  - ceftriaxone 2 mg qd  - hepatology consulted:   - Recommend broad workup for other causes of encephalopathy, and agree with infectious workup.  - Ordered HSV and CMV DNA serologies, will follow-up.  - Follow-up PETH.  - Continue Lactulose and consider enema to facilitate bowel movement in setting of visualized and reported constipation.  - Continue Tacrolimus 0.5 mg BID and Sirolimus 1mg QD.   - Please obtain daily CBC, CMP,  PT/INR, and Tacro levels.

## 2024-09-20 NOTE — PLAN OF CARE
Plan of care reviewed with patient. Call light within reach, fall precautions maintained bed alarm set. Patient made aware. Nurse instructed patient to call for assistance. Patient verbalized understanding. Telemetry monitor throughout shift. NAD noted. Will continue to monitor and continue plan of care.         Problem: Skin Injury Risk Increased  Goal: Skin Health and Integrity  Outcome: Progressing     Problem: Adult Inpatient Plan of Care  Goal: Plan of Care Review  Outcome: Progressing  Goal: Patient-Specific Goal (Individualized)  Outcome: Progressing  Goal: Absence of Hospital-Acquired Illness or Injury  Outcome: Progressing  Goal: Optimal Comfort and Wellbeing  Outcome: Progressing  Goal: Readiness for Transition of Care  Outcome: Progressing     Problem: Acute Kidney Injury/Impairment  Goal: Fluid and Electrolyte Balance  Outcome: Progressing  Goal: Improved Oral Intake  Outcome: Progressing  Goal: Effective Renal Function  Outcome: Progressing

## 2024-09-20 NOTE — SUBJECTIVE & OBJECTIVE
Interval History: NAEO. 1 BM 9/20.     Review of Systems   Respiratory:  Negative for chest tightness and shortness of breath.    Gastrointestinal:  Negative for nausea.   Musculoskeletal:  Positive for myalgias.   Neurological:  Positive for speech difficulty.   Psychiatric/Behavioral:  Positive for agitation and confusion.      Objective:     Vital Signs (Most Recent):  Temp: 98 °F (36.7 °C) (09/20/24 1558)  Pulse: 68 (09/20/24 1558)  Resp: 12 (09/20/24 1558)  BP: 119/63 (09/20/24 1558)  SpO2: 97 % (09/20/24 1558) Vital Signs (24h Range):  Temp:  [98 °F (36.7 °C)-99.7 °F (37.6 °C)] 98 °F (36.7 °C)  Pulse:  [68-90] 68  Resp:  [12-24] 12  SpO2:  [94 %-99 %] 97 %  BP: (113-143)/(57-75) 119/63     Weight: 79.4 kg (175 lb)  Body mass index is 28.25 kg/m².    Intake/Output Summary (Last 24 hours) at 9/20/2024 1644  Last data filed at 9/19/2024 2115  Gross per 24 hour   Intake 445.33 ml   Output --   Net 445.33 ml         Physical Exam  Constitutional:       Appearance: He is ill-appearing and toxic-appearing.   Cardiovascular:      Rate and Rhythm: Normal rate and regular rhythm.   Pulmonary:      Effort: Pulmonary effort is normal.   Abdominal:      General: There is distension.      Tenderness: There is abdominal tenderness. There is guarding.   Musculoskeletal:         General: Tenderness present.      Right lower leg: Edema present.      Left lower leg: Edema present.      Right foot: Swelling present.      Left foot: Swelling present.      Comments: Pronounced arches, swelling in feet   Skin:     General: Skin is warm.   Neurological:      Mental Status: He is alert. He is disoriented.             Significant Labs: All pertinent labs within the past 24 hours have been reviewed.  CBC:   Recent Labs   Lab 09/19/24  1226 09/20/24  0506   WBC 4.77 4.57   HGB 11.0* 10.2*   HCT 33.8* 31.5*   PLT 70* 62*     CMP:   Recent Labs   Lab 09/19/24  1226 09/20/24  0506    142   K 4.0 4.1   * 116*   CO2 16* 16*   GLU  103 82   BUN 32* 29*   CREATININE 2.2* 2.0*   CALCIUM 9.1 8.6*   PROT 6.3 5.8*   ALBUMIN 2.7* 2.4*   BILITOT 2.5* 2.2*   ALKPHOS 143* 126   AST 40 39   ALT 22 18   ANIONGAP 10 10       Significant Imaging: I have reviewed all pertinent imaging results/findings within the past 24 hours.

## 2024-09-20 NOTE — ED NOTES
APPEARANCE: Pt is fidgeting in bed. Pt anxious and tearful.   SKIN: warm, dry and intact. No breakdown or bruising.   MUSCULOSKELETAL: Patient moving all extremities spontaneously, no obvious swelling or deformities noted. Ambulates independently.  RESPIRATORY: Denies shortness of breath.Respirations unlabored. + SOB  CARDIAC: Denies CP, 2+ distal pulses; no peripheral edema. + chest pain  ABDOMEN: S/ND/NT, Denies nausea. Scar across abdomen.   : voids spontaneously, denies difficulty  Neurologic: follows commands equal strength in all extremities; denies numbness/tingling. Denies dizziness, TESS, pupils 3mm. awake and alert. Pt is oriented to self and place, disoriented to situation and time.

## 2024-09-20 NOTE — ASSESSMENT & PLAN NOTE
- continued sirolimus 1 mg  - continued tacrolimus .5 mg BID   - consult hepatology for recommendations

## 2024-09-20 NOTE — CONSULTS
[facility]  Hepatology  Consult Note    Patient Name: Jhonny Diana  MRN: [id number]  Admission Date: 9/19/2024  Hospital Length of Stay: 1 days  Code Status: Full Code   Attending Provider: Antony Vasquez MD   Consulting Provider: Luís Gomez MD  Primary Care Physician: Elo Bull MD  Principal Problem:Hepatic encephalopathy    Inpatient consult to Hepatology  Consult performed by: Luís Gomez MD  Consult ordered by: Yamila Carter MD        Subjective:     HPI: Jhonny Diana is a 34 y.o. male with history of alcoholic cirrhosis s/p liver transplant 2017 (on tacrolimus 0.5 mg BID and sirolimus 1mg qd) and CKD who presented to the ED on 9/19 with AMS. Patient encephalopathic at time of evaluation, so history provided by mother at bedside who is also primary caretaker. Mother reports that she noticed a change in the patient's behavior and appearance since 9/18. She noticed that he looked unwell and he would not respond appropriately to questions. On morning of 9/19 she reports he was completely confused and disoriented. She noted that the last time this happened he came into the hospital with pneumonia. The patient endorses diffuse MSK pain throughout limbs. Mother manages his medications and also administers his medications, stating he has not missed any of his meds or taking extra doses. ED workup was largely remarkable for ammonia of 81 and Cr 2.2 (baseline 1.7). Hepatology consulted for acute encephalopathy concerning for hepatic encephalopathy.      Past Medical History:   Diagnosis Date    Abdominal pain 05/27/2018    Alcoholic cirrhosis 08/14/2018    Alcoholic hepatitis with ascites     Anemia     CKD (chronic kidney disease) stage 3, GFR 30-59 ml/min 08/14/2018    Encounter for blood transfusion     multiple blood transfusions    ESRD on dialysis     Herpes zoster without complication 12/11/2019    History of hematemesis 09/28/2017    Hypertension     Seizures      Substance abuse     alcohol and opioids    Thrombocytopenia     Transplanted liver     10/2017       Past Surgical History:   Procedure Laterality Date    APPENDECTOMY      CENTRAL VENOUS CATHETER TUNNELED INSERTION DOUBLE LUMEN      inserted and removed    ERCP      ERCP N/A 2018    Procedure: ERCP;  Surgeon: Solis Villanueva MD;  Location: [facility] ([address] [place] [address]);  Service: Endoscopy;  Laterality: N/A;  dialysis/cirrhosis/labs prior to ERCP/svn  platelet    ESOPHAGOGASTRODUODENOSCOPY      ESOPHAGOGASTRODUODENOSCOPY N/A 2019    Procedure: EGD (ESOPHAGOGASTRODUODENOSCOPY);  Surgeon: Sheri Siddiqui MD;  Location: [facility] ([address] [place] [address]);  Service: Endoscopy;  Laterality: N/A;    LIVER TRANSPLANT      10/2017       Family History   Problem Relation Name Age of Onset    No Known Problems Mother      Cancer Father          Lung cancer    Alcohol abuse Father      No Known Problems Sister 3     No Known Problems Brother 1     Kidney disease Neg Hx         Social History     Socioeconomic History    Marital status: Significant Other   Occupational History     Comment: full time    Tobacco Use    Smoking status: Former     Current packs/day: 0.00     Average packs/day: 1 pack/day for 10.0 years (10.0 ttl pk-yrs)     Types: Cigarettes     Start date: 2007     Quit date: 2017     Years since quittin.5    Smokeless tobacco: Never   Substance and Sexual Activity    Alcohol use: No     Comment: a fifth of liquor daily for years, cut back over last 2 months    Drug use: No    Sexual activity: Not Currently     Partners: Male     Comment: single   Social History Narrative    He lives in [facility] with his roommate      Social Determinants of Health     Financial Resource Strain: High Risk (2024)    Overall Financial Resource Strain (CARDIA)     Difficulty of Paying Living Expenses: Hard   Food Insecurity: No Food Insecurity (2024)    Hunger Vital Sign     Worried About Running Out of Food in the  Last Year: Never true     Ran Out of Food in the Last Year: Never true   Physical Activity: Insufficiently Active (6/5/2024)    Exercise Vital Sign     Days of Exercise per Week: 2 days     Minutes of Exercise per Session: 60 min   Stress: Stress Concern Present (6/5/2024)    [employer] - Occupational Stress Questionnaire     Feeling of Stress : To some extent   Housing Stability: Unknown (6/5/2024)    Housing Stability Vital Sign     Unable to Pay for Housing in the Last Year: No       No current facility-administered medications on file prior to encounter.     Current Outpatient Medications on File Prior to Encounter   Medication Sig Dispense Refill    diazePAM (VALIUM) 2 MG tablet Take 2 mg by mouth 2 (two) times a day.      methylnaltrexone (RELISTOR) 150 mg Tab  (Patient not taking: Reported on 8/21/2024)      naloxone (NARCAN) 4 mg/actuation Spry CALL 911. SPR CONTENTS OF ONE SPRAYER (0.1ML) INTO ONE NOSTRIL. REPEAT IN 2-3 MIN IF SYMPTOMS OF OPIOID EMERGENCY PERSIST, ALTERNATE NOSTRILS (Patient not taking: Reported on 8/21/2024)      oxyCODONE (ROXICODONE) 15 MG Tab Take 10 mg by mouth 3 (three) times daily.      predniSONE (DELTASONE) 10 MG tablet Take 4 tablets (40 mg total) by mouth once daily. (Patient taking differently: Take 20 mg by mouth once daily. 2 tabs a day) 120 tablet 1    sirolimus (RAPAMUNE) 1 MG Tab Take 1 tablet (1 mg total) by mouth once daily. 30 tablet 11    tacrolimus (PROGRAF) 0.5 MG Cap Take 1 capsule (0.5 mg total) by mouth every 12 (twelve) hours. 60 capsule 11    tiZANidine 4 mg Cap Take 1 tablet by mouth daily as needed.      torsemide (DEMADEX) 10 MG Tab       valACYclovir (VALTREX) 500 MG tablet Take by mouth once daily.         Review of patient's allergies indicates:   Allergen Reactions    Bactrim [sulfamethoxazole-trimethoprim] Other (See Comments)     Mookie Trell Syndrome       Review of Systems   Unable to perform ROS: Mental status change         Objective:     Vitals:    09/20/24 1204   BP: 123/62   Pulse: 73   Resp: 18   Temp: 98.4 °F (36.9 °C)         Constitutional: No acute distress. Oriented to person and place. Disoriented to time.  HENT: Normal, atraumatic  Eyes: No scleral icterus. Pupils equal and reactive.  Cardiovascular: Normal rate, regular rhythm, no murmurs  Respiratory: No distress, stable on room air  GI: Soft, non-distended, non-tender to palpation  Musculoskeletal: ROM intact, no muscle wasting. Diffuse tenderness to palpation.  Skin: No lesions. No jaundice  Neurological: No focal deficits.  Psychiatric: Poor historian. Somnolent.    Significant Labs:  Recent Labs   Lab 09/19/24  1226 09/20/24  0506   HGB 11.0* 10.2*       Lab Results   Component Value Date    WBC 4.57 09/20/2024    HGB 10.2 (L) 09/20/2024    HCT 31.5 (L) 09/20/2024     (H) 09/20/2024    PLT 62 (L) 09/20/2024       Lab Results   Component Value Date     09/20/2024    K 4.1 09/20/2024     (H) 09/20/2024    CO2 16 (L) 09/20/2024    BUN 29 (H) 09/20/2024    CREATININE 2.0 (H) 09/20/2024    CALCIUM 8.6 (L) 09/20/2024    ANIONGAP 10 09/20/2024    ESTGFRAFRICA 53.5 (A) 12/11/2019    EGFRNONAA 46.3 (A) 12/11/2019       Lab Results   Component Value Date    ALT 18 09/20/2024    AST 39 09/20/2024     (H) 10/26/2017    ALKPHOS 126 09/20/2024    BILITOT 2.2 (H) 09/20/2024       Lab Results   Component Value Date    INR 1.1 09/20/2024    INR 1.1 09/19/2024    INR 1.0 12/06/2019       Significant Imaging:  Reviewed pertinent radiology findings.       Assessment/Plan:     Jhonny Diana is a 34 y.o. male with history of OLT in 10/2017 for alcohol related cirrhosis and acute alcohol related hepatitis who presents with acute encephalopathy. Patient's encephalopathy noted to be ongoing for the last two days. Unclear etiology, but ammonia was noted to be elevated to 81 with constipation seen on CT a/p. Patient recently with concern for acute rejection  due to elevated LFTs and had been on steroids with improvement. Now off of steroids completely and with tapering of Tacro to 0.5mg BID. RUQUS on admission with good allograft function. LFTs normal on presentation.     While his ammonia is elevated, it is unusual that he would have hepatic encephalopathy without underlying cirrhosis of his transplanted liver with good allograft function and otherwise not with acute liver injury with normal LFTs on admission. That said, recent liver biopsy did show signs of bridging fibrosis which could be concerning for early cirrhosis. Finally, he is on several medications for pain and anxiety that could affect mentation, including opiates, benzos and prescribed cannabis; polypharmacy could be contributing as well to his encephalopathy.    Problem List:  Encephalopathy; possible hepatic encephalopathy  Status post liver transplant in 2017   History of biliary stricture of transplanted liver   Long-term use of immunosuppressant medication     Recommendations:  - Recommend broad workup for other causes of encephalopathy, and agree with infectious workup.  - Ordered HSV and CMV DNA serologies, will follow-up.  - Follow-up PETH.  - Continue Lactulose and consider enema to facilitate bowel movement in setting of visualized and reported constipation.  - Continue Tacrolimus 0.5 mg BID and Sirolimus 1mg QD.   - Please obtain daily CBC, CMP,  PT/INR, and Tacro levels.    Thank you for involving us in the care of Jhonny iDana. Please call with any additional questions, concerns or changes in the patient's clinical status. We will continue to follow.     Patient seen and discussed with Dr. Morgan.    Luís Gomez MD  Internal Medicine PGY3  Hepatology

## 2024-09-20 NOTE — HPI
Mr. iDana is a 34 year-old male with a pmhx of history of liver disease s/p liver transplant 2017 on tacrolimus .5 mg BID and sirolimus 1mg qd, ESRD on dialysis last dialysis in 2018, presenting to the ED with AMS. Change started last night (9/18), mother noticed he would not respond appropriately to questions 9/19 AM, noted that the last time this happened he came into the hospital with pneumonia.  The patient states he was preparing for surgery to repair a hernia, and he was on 1mg tacrolimus and two nights ago started .5 mg tacrolimus. He describes diffuse pain throughout limbs. His mother was at bedside and administers his medications. Patient states that he was recently discontinued on prednisone after there was concern for rejection. She does not endorse any missed medications or taking additional doses of medications. There are no sick contacts and recently traveled to [place] to see a friend a few weeks ago.         ED initial workup:  CBC: WBC 4.77 H/H: 11/33.8, platelets: 70  CMP: 145, 4, 119, 16, 32/2.2 (baseline 1.7), tbili: 2.5 , elevated Alk phos 143, normal AST/ALT  Magnesium: 1.5  Ammonia: 81  Blood cultures drawn   Drug screen: + benzodiazepines, opiates, THC  UA: trace protein, trace occult blood  Tacrolimus level ordered    Imaging:     CT abd pelvis: Moderate to large colonic stool burden may reflect constipation, but to a lesser degree from 07/27/2024 study.  No convincing evidence of bowel obstruction or acute bowel inflammation. Continued small right pleural effusion, slightly increased in volume from 07/27/2024 study. Previous small volume unencapsulated fluid within the anterior midline abdominal wall supraumbilical region is decreased in amount.  Interval decreased fat stranding of the supraumbilical ventral abdominal wall.     Cxr: Slight interval increased right more than left diffuse nonspecific interstitial coarsening which may reflect pulmonary edema versus interstitial type  pneumonia. Suspected ongoing/residual small right pleural effusion. No consolidative process seen.     Ct head w/o contrast:No evidence of acute hemorrhage or major vascular distribution infarct.     Started on vancomycin, zosyn, and lactulose.

## 2024-09-20 NOTE — ASSESSMENT & PLAN NOTE
Ongoing chronic pain in right abdomen and in back. Home medication ordered for oxycodone TID.     - oxycodone 15 mg PRN

## 2024-09-20 NOTE — PLAN OF CARE
Patient arrived, oriented to person and place. Complaints of generalized discomfort/soreness. VSS on RA. Safety precautions initiated and call light in reach. Mother at bedside.       Problem: Adult Inpatient Plan of Care  Goal: Plan of Care Review  Outcome: Progressing     Problem: Adult Inpatient Plan of Care  Goal: Patient-Specific Goal (Individualized)  Outcome: Progressing     Problem: Adult Inpatient Plan of Care  Goal: Absence of Hospital-Acquired Illness or Injury  Outcome: Progressing     Problem: Adult Inpatient Plan of Care  Goal: Optimal Comfort and Wellbeing  Outcome: Progressing

## 2024-09-20 NOTE — PLAN OF CARE
[facility] ([place])  Initial Discharge Assessment      Discharge Plan A and Plan B have been determined by review of patient's clinical status, future medical and therapeutic needs, and coverage/benefits for post-acute care in coordination with multidisciplinary team members.      Primary Care Provider: Elo Bull MD    Admission Diagnosis: Altered mental status [R41.82]  Chest pain [R07.9]    Admission Date: 9/19/2024  Expected Discharge Date: 9/22/2024    Transition of Care Barriers: Substance Abuse    Payor: [facility] MANAGED MCARE / Plan: [facility] MEDICARE COMPLETE / Product Type: Medicare Advantage /     Extended Emergency Contact Information  Primary Emergency Contact: Elvira Diana  Address: [address] [place] [address] [facility]  Home Phone: [phone]  Work Phone: [phone]  Mobile Phone: [phone]  Relation: Mother  Secondary Emergency Contact: [place] [address] [place]  Mobile Phone: [phone]  Relation: Sister    Discharge Plan A: Home with family  Discharge Plan B: Home      [facility] - [place], [place] - [address]  [address]  [facility] [address]  Phone: [phone] Fax: [phone]      Initial Assessment (most recent)       Adult Discharge Assessment - 09/20/24 1545          Discharge Assessment    Assessment Type Discharge Planning Assessment     Confirmed/corrected address, phone number and insurance Yes     Confirmed Demographics Correct on Facesheet     Source of Information patient;family   LebronElvira (Mother)  [phone] (Home Phone) present at bedside    When was your last doctors appointment? 09/18/24   Dr. Elo Earl MD    Communicated DERICK with patient/caregiver Yes     Reason For Admission Hepatic encephalopathy     People in Home parent(s)   Xiomara Dianae (Mother)  [phone] (Home Phone)    Do you expect to return to your  current living situation? Yes     Do you have help at home or someone to help you manage your care at home? Yes     Who are your caregiver(s) and their phone number(s)? Elvira Diana (Mother)  [phone] (Home Phone)     Prior to hospitilization cognitive status: Alert/Oriented     Current cognitive status: Alert/Oriented     Walking or Climbing Stairs Difficulty yes     Walking or Climbing Stairs ambulation difficulty, requires equipment     Mobility Management straight cane that he uses as needed     Dressing/Bathing Difficulty no     Home Accessibility wheelchair accessible     Equipment Currently Used at Home cane, straight     Readmission within 30 days? No     Patient currently being followed by outpatient case management? No     Do you currently have service(s) that help you manage your care at home? No     Do you take prescription medications? Yes     Do you have prescription coverage? Yes     Coverage [facility] MEDICARE COMPLETE -     Do you have any problems affording any of your prescribed medications? No     Is the patient taking medications as prescribed? yes     Who is going to help you get home at discharge? Elvira Diana (Mother)  [phone] (Home Phone)     How do you get to doctors appointments? family or friend will provide     Are you on dialysis? No   Previous HD patient    Do you take coumadin? No     Discharge Plan A Home with family     Discharge Plan B Home     DME Needed Upon Discharge  oxygen     Discharge Plan discussed with: Parent(s);Patient     Name(s) and Number(s) Elvira Diana (Mother)  [phone] (Home Phone)     Transition of Care Barriers Substance Abuse        Physical Activity    On average, how many days per week do you engage in moderate to strenuous exercise (like a brisk walk)? 5 days     On average, how many minutes do you engage in exercise at this level? 30 min        Financial Resource Strain    How hard is it for you to pay for the  very basics like food, housing, medical care, and heating? Not hard at all        Housing Stability    In the last 12 months, was there a time when you were not able to pay the mortgage or rent on time? No     At any time in the past 12 months, were you homeless or living in a shelter (including now)? No        Transportation Needs    Has the lack of transportation kept you from medical appointments, meetings, work or from getting things needed for daily living? No        Food Insecurity    Within the past 12 months, you worried that your food would run out before you got the money to buy more. Never true     Within the past 12 months, the food you bought just didn't last and you didn't have money to get more. Never true        Stress    Do you feel stress - tense, restless, nervous, or anxious, or unable to sleep at night because your mind is troubled all the time - these days? To some extent        Social Isolation    How often do you feel lonely or isolated from those around you?  Never        Alcohol Use    Q1: How often do you have a drink containing alcohol? Patient declined     Q2: How many drinks containing alcohol do you have on a typical day when you are drinking? Patient declined     Q3: How often do you have six or more drinks on one occasion? Patient declined        Utilities    In the past 12 months has the electric, gas, oil, or water company threatened to shut off services in your home? No        Health Literacy    How often do you need to have someone help you when you read instructions, pamphlets, or other written material from your doctor or pharmacy? Never        OTHER    Name(s) of People in Home Elvira Diana (Mother)  [phone] (Home Phone)                         09/20/24 8095   Post-Acute Status   Post-Acute Authorization Home Health   Home Health Status Patient declined/refused       CM met with patient at bedside to complete discharge planning assessment.  Patient alert and oriented xs  "4.  Patient verified all demographic information on facesheet is correct.  Patient verified PCP is Dr cady Bull # [phone]   Patient verified primary health insurance is [facility] MEDICARE COMPLETE   . Patient/family is agreeable with bedside medication delivery.   Patient is not active with  home health ( patient declines HH services) and has listed DME.  Patient with NO POA or Living Will.  Patient not on dialysis or medication coumadin.  Patient with no 30 day admission.  Patient with no financial issues at this time.  Patient family mother  will provide transportation upon discharge from facility.  Patient will have assistance from his mother if needed  upon discharge Patient independent with ADLs.   All questions answered regarding Case Management Discharge Planning, patient verbalized understanding.  Discharge booklet with CM's contact information given to patient.    CM provided patient a provider list of  primary physicians , patient states, I need another physician that will respect me and honor my concerns."              Jolly Azevedo RN  Case Management  [facility]  [phone]           "

## 2024-09-21 VITALS
OXYGEN SATURATION: 95 % | RESPIRATION RATE: 18 BRPM | SYSTOLIC BLOOD PRESSURE: 115 MMHG | WEIGHT: 175 LBS | TEMPERATURE: 98 F | HEART RATE: 70 BPM | BODY MASS INDEX: 28.12 KG/M2 | DIASTOLIC BLOOD PRESSURE: 62 MMHG | HEIGHT: 66 IN

## 2024-09-21 LAB
ALBUMIN SERPL BCP-MCNC: 2.4 G/DL (ref 3.5–5.2)
ALP SERPL-CCNC: 131 U/L (ref 55–135)
ALT SERPL W/O P-5'-P-CCNC: 22 U/L (ref 10–44)
ANION GAP SERPL CALC-SCNC: 8 MMOL/L (ref 8–16)
AST SERPL-CCNC: 43 U/L (ref 10–40)
BASOPHILS # BLD AUTO: 0.02 K/UL (ref 0–0.2)
BASOPHILS NFR BLD: 0.5 % (ref 0–1.9)
BILIRUB SERPL-MCNC: 2 MG/DL (ref 0.1–1)
BUN SERPL-MCNC: 24 MG/DL (ref 6–20)
CALCIUM SERPL-MCNC: 8.6 MG/DL (ref 8.7–10.5)
CHLORIDE SERPL-SCNC: 114 MMOL/L (ref 95–110)
CO2 SERPL-SCNC: 17 MMOL/L (ref 23–29)
CREAT SERPL-MCNC: 2 MG/DL (ref 0.5–1.4)
DIFFERENTIAL METHOD BLD: ABNORMAL
EOSINOPHIL # BLD AUTO: 0.1 K/UL (ref 0–0.5)
EOSINOPHIL NFR BLD: 2 % (ref 0–8)
ERYTHROCYTE [DISTWIDTH] IN BLOOD BY AUTOMATED COUNT: 12.6 % (ref 11.5–14.5)
EST. GFR  (NO RACE VARIABLE): 43.8 ML/MIN/1.73 M^2
GLUCOSE SERPL-MCNC: 88 MG/DL (ref 70–110)
HCT VFR BLD AUTO: 29.5 % (ref 40–54)
HGB BLD-MCNC: 10.2 G/DL (ref 14–18)
IMM GRANULOCYTES # BLD AUTO: 0.01 K/UL (ref 0–0.04)
IMM GRANULOCYTES NFR BLD AUTO: 0.2 % (ref 0–0.5)
INR PPP: 1.1 (ref 0.8–1.2)
LYMPHOCYTES # BLD AUTO: 1.4 K/UL (ref 1–4.8)
LYMPHOCYTES NFR BLD: 34.3 % (ref 18–48)
MAGNESIUM SERPL-MCNC: 2.1 MG/DL (ref 1.6–2.6)
MCH RBC QN AUTO: 33.7 PG (ref 27–31)
MCHC RBC AUTO-ENTMCNC: 34.6 G/DL (ref 32–36)
MCV RBC AUTO: 97 FL (ref 82–98)
MONOCYTES # BLD AUTO: 0.5 K/UL (ref 0.3–1)
MONOCYTES NFR BLD: 13.2 % (ref 4–15)
NEUTROPHILS # BLD AUTO: 2 K/UL (ref 1.8–7.7)
NEUTROPHILS NFR BLD: 49.8 % (ref 38–73)
NRBC BLD-RTO: 0 /100 WBC
PHOSPHATE SERPL-MCNC: 3.8 MG/DL (ref 2.7–4.5)
PLATELET # BLD AUTO: 67 K/UL (ref 150–450)
PMV BLD AUTO: 10.8 FL (ref 9.2–12.9)
POCT GLUCOSE: 112 MG/DL (ref 70–110)
POCT GLUCOSE: 149 MG/DL (ref 70–110)
POTASSIUM SERPL-SCNC: 4.2 MMOL/L (ref 3.5–5.1)
PROT SERPL-MCNC: 5.9 G/DL (ref 6–8.4)
PROTHROMBIN TIME: 11.9 SEC (ref 9–12.5)
RBC # BLD AUTO: 3.03 M/UL (ref 4.6–6.2)
SODIUM SERPL-SCNC: 139 MMOL/L (ref 136–145)
TACROLIMUS BLD-MCNC: 6.4 NG/ML (ref 5–15)
WBC # BLD AUTO: 4.08 K/UL (ref 3.9–12.7)

## 2024-09-21 PROCEDURE — 83735 ASSAY OF MAGNESIUM: CPT

## 2024-09-21 PROCEDURE — 25000003 PHARM REV CODE 250

## 2024-09-21 PROCEDURE — 85610 PROTHROMBIN TIME: CPT

## 2024-09-21 PROCEDURE — 84100 ASSAY OF PHOSPHORUS: CPT

## 2024-09-21 PROCEDURE — 80053 COMPREHEN METABOLIC PANEL: CPT

## 2024-09-21 PROCEDURE — 36415 COLL VENOUS BLD VENIPUNCTURE: CPT | Performed by: STUDENT IN AN ORGANIZED HEALTH CARE EDUCATION/TRAINING PROGRAM

## 2024-09-21 PROCEDURE — 80197 ASSAY OF TACROLIMUS: CPT

## 2024-09-21 PROCEDURE — 80321 ALCOHOLS BIOMARKERS 1OR 2: CPT | Performed by: STUDENT IN AN ORGANIZED HEALTH CARE EDUCATION/TRAINING PROGRAM

## 2024-09-21 PROCEDURE — 63600175 PHARM REV CODE 636 W HCPCS

## 2024-09-21 PROCEDURE — 85025 COMPLETE CBC W/AUTO DIFF WBC: CPT

## 2024-09-21 RX ORDER — LACTULOSE 10 G/15ML
20 SOLUTION ORAL; RECTAL 3 TIMES DAILY
Qty: 630 ML | Refills: 0 | Status: SHIPPED | OUTPATIENT
Start: 2024-09-21

## 2024-09-21 RX ORDER — NALOXEGOL OXALATE 12.5 MG/1
12.5 TABLET, FILM COATED ORAL DAILY
Qty: 30 TABLET | Refills: 0 | Status: SHIPPED | OUTPATIENT
Start: 2024-09-21

## 2024-09-21 RX ADMIN — OXYCODONE HYDROCHLORIDE 15 MG: 10 TABLET ORAL at 02:09

## 2024-09-21 RX ADMIN — OXYCODONE HYDROCHLORIDE 15 MG: 10 TABLET ORAL at 08:09

## 2024-09-21 RX ADMIN — PANTOPRAZOLE SODIUM 40 MG: 40 INJECTION, POWDER, FOR SOLUTION INTRAVENOUS at 08:09

## 2024-09-21 RX ADMIN — TACROLIMUS 0.5 MG: 0.5 CAPSULE ORAL at 08:09

## 2024-09-21 RX ADMIN — DIAZEPAM 2 MG: 2 TABLET ORAL at 01:09

## 2024-09-21 RX ADMIN — LACTULOSE 15 G: 20 SOLUTION ORAL at 10:09

## 2024-09-21 RX ADMIN — RIFAXIMIN 550 MG: 550 TABLET ORAL at 08:09

## 2024-09-21 RX ADMIN — SIROLIMUS 1 MG: 1 TABLET ORAL at 08:09

## 2024-09-21 NOTE — PLAN OF CARE
Problem: Skin Injury Risk Increased  Goal: Skin Health and Integrity  Outcome: Met     Problem: Adult Inpatient Plan of Care  Goal: Plan of Care Review  Outcome: Met  Goal: Patient-Specific Goal (Individualized)  Outcome: Met  Goal: Absence of Hospital-Acquired Illness or Injury  Outcome: Met  Goal: Optimal Comfort and Wellbeing  Outcome: Met  Goal: Readiness for Transition of Care  Outcome: Met     Problem: Acute Kidney Injury/Impairment  Goal: Fluid and Electrolyte Balance  Outcome: Met  Goal: Improved Oral Intake  Outcome: Met  Goal: Effective Renal Function  Outcome: Met     Patient discharged to home. Discharge papers reviewed with patient and mother. Verbalized understanding. Iv removed with tip intact. Tele removed. Patient escorted by mother.

## 2024-09-21 NOTE — PLAN OF CARE
Patient in bed, Aaox4, on cellular device speaking to a friend. One complaint of pain overnight. VSS on RA. Mother at bedside. Safety precautions maintained and call light in reach.     Patient with additional complaints of pain. PRN administered at 0236. Patient awakens and request another dose of pain medication at 0424 after education on ordered frequency of medication. Patient reeducated. Minimal evidence of understanding.       Problem: Skin Injury Risk Increased  Goal: Skin Health and Integrity  Outcome: Progressing     Problem: Adult Inpatient Plan of Care  Goal: Plan of Care Review  Outcome: Progressing     Problem: Adult Inpatient Plan of Care  Goal: Patient-Specific Goal (Individualized)  Outcome: Progressing     Problem: Adult Inpatient Plan of Care  Goal: Absence of Hospital-Acquired Illness or Injury  Outcome: Progressing

## 2024-09-21 NOTE — PROGRESS NOTES
[facility] - Stepdown [facility] ([place] [address])  [facility] Medicine  Progress Note    Patient Name: Jhonny Diana  MRN: [id number]  Patient Class: IP- Inpatient   Admission Date: 9/19/2024  Length of Stay: 2 days  Attending Physician: Antony Vasquez MD  Primary Care Provider: Elo Bull MD        Subjective:     Principal Problem:Hepatic encephalopathy        HPI:  Mr. Diana is a 34 year-old male with a pmhx of history of liver disease s/p liver transplant 2017 on tacrolimus .5 mg BID and sirolimus 1mg qd, ESRD on dialysis last dialysis in 2018, presenting to the ED with AMS. Change started last night (9/18), mother noticed he would not respond appropriately to questions 9/19 AM, noted that the last time this happened he came into the hospital with pneumonia.  The patient states he was preparing for surgery to repair a hernia, and he was on 1mg tacrolimus and two nights ago started .5 mg tacrolimus. He describes diffuse pain throughout limbs. His mother was at bedside and administers his medications. Patient states that he was recently discontinued on prednisone after there was concern for rejection. She does not endorse any missed medications or taking additional doses of medications. There are no sick contacts and recently traveled to [place] to see a friend a few weeks ago.         ED initial workup:  CBC: WBC 4.77 H/H: 11/33.8, platelets: 70  CMP: 145, 4, 119, 16, 32/2.2 (baseline 1.7), tbili: 2.5 , elevated Alk phos 143, normal AST/ALT  Magnesium: 1.5  Ammonia: 81  Blood cultures drawn   Drug screen: + benzodiazepines, opiates, THC  UA: trace protein, trace occult blood  Tacrolimus level ordered    Imaging:     CT abd pelvis: Moderate to large colonic stool burden may reflect constipation, but to a lesser degree from 07/27/2024 study.  No convincing evidence of bowel obstruction or acute bowel inflammation. Continued small right pleural effusion, slightly increased in volume from 07/27/2024 study.  Previous small volume unencapsulated fluid within the anterior midline abdominal wall supraumbilical region is decreased in amount.  Interval decreased fat stranding of the supraumbilical ventral abdominal wall.     Cxr: Slight interval increased right more than left diffuse nonspecific interstitial coarsening which may reflect pulmonary edema versus interstitial type pneumonia. Suspected ongoing/residual small right pleural effusion. No consolidative process seen.     Ct head w/o contrast:No evidence of acute hemorrhage or major vascular distribution infarct.     Started on vancomycin, zosyn, and lactulose.       Overview/Hospital Course:  No notes on file    Interval History: Patient's mentation has improved. Mr. Diana has 3 bowel movements between 9/20 and 9/21. His mother is at bedside and says that his disposition is still more aggressive and combative than at baseline.     Review of Systems   Respiratory:  Negative for chest tightness and shortness of breath.    Gastrointestinal:  Negative for nausea.   Musculoskeletal:  Positive for myalgias.   Neurological:  Positive for speech difficulty.   Psychiatric/Behavioral:  Positive for agitation. Negative for confusion.      Objective:     Vital Signs (Most Recent):  Temp: 98.1 °F (36.7 °C) (09/21/24 0807)  Pulse: 70 (09/21/24 1113)  Resp: 18 (09/21/24 0840)  BP: (!) 112/57 (09/21/24 0807)  SpO2: 96 % (09/21/24 1100) Vital Signs (24h Range):  Temp:  [98 °F (36.7 °C)-98.7 °F (37.1 °C)] 98.1 °F (36.7 °C)  Pulse:  [68-75] 70  Resp:  [12-18] 18  SpO2:  [96 %-97 %] 96 %  BP: (112-127)/(57-69) 112/57     Weight: 79.4 kg (175 lb)  Body mass index is 28.25 kg/m².  No intake or output data in the 24 hours ending 09/21/24 1132      Physical Exam  Cardiovascular:      Rate and Rhythm: Normal rate and regular rhythm.   Pulmonary:      Effort: Pulmonary effort is normal.   Abdominal:      General: There is distension.   Musculoskeletal:         General: Tenderness present.       Right foot: Swelling present.      Left foot: Swelling present.      Comments: Pronounced arches, swelling in feet  Difficult to assess for edema given the patient's sensitivity to touch   Skin:     General: Skin is warm.   Neurological:      Mental Status: He is alert. He is disoriented.             Significant Labs: All pertinent labs within the past 24 hours have been reviewed.    Significant Imaging: I have reviewed all pertinent imaging results/findings within the past 24 hours.    Assessment/Plan:      * Hepatic encephalopathy  Hepatic encephalopathy may be secondary to constipation or PVT given pain in RUQ. Ammonia elevated at 81 and CT showed moderate to large stool burden.    - lactulose  20 g TID  - rifaximin 550 mg BID  - ceftriaxone 2 mg qd  - hepatology consulted:   - Recommend broad workup for other causes of encephalopathy, and agree with infectious workup.  - Ordered HSV and CMV DNA serologies, will follow-up.  - Follow-up PETH.  - Continue Lactulose and consider enema to facilitate bowel movement in setting of visualized and reported constipation.  - Continue Tacrolimus 0.5 mg BID and Sirolimus 1mg QD.   - Please obtain daily CBC, CMP,  PT/INR, and Tacro levels.        Chronic pain  Ongoing chronic pain in right abdomen and in back. Home medication ordered for oxycodone TID.     - oxycodone 15 mg PRN      Thrombocytopenia  The likely etiology of thrombocytopenia is liver disease. The patients 3 most recent labs are listed below.  Recent Labs     09/19/24  1226   PLT 70*     Plan  - Will transfuse if platelet count is <10k.      History of liver transplant  - continued sirolimus 1 mg  - continued tacrolimus .5 mg BID   - consult hepatology for recommendations         VTE Risk Mitigation (From admission, onward)           Ordered     IP VTE LOW RISK PATIENT  Once         09/19/24 1834     Place sequential compression device  Until discontinued         09/19/24 1834                    Discharge  Planning   DERICK: 9/23/2024     Code Status: Full Code   Is the patient medically ready for discharge?:     Reason for patient still in hospital (select all that apply): Patient trending condition  Discharge Plan A: Home with family                  Yamila Carter MD  Department of Hospital Medicine   [facility] - Stepdown Flex ([place])

## 2024-09-21 NOTE — SUBJECTIVE & OBJECTIVE
Interval History: Patient's mentation has improved. Mr. Diana has 3 bowel movements between 9/20 and 9/21. His mother is at bedside and says that his disposition is still more aggressive and combative than at baseline.     Review of Systems   Respiratory:  Negative for chest tightness and shortness of breath.    Gastrointestinal:  Negative for nausea.   Musculoskeletal:  Positive for myalgias.   Neurological:  Positive for speech difficulty.   Psychiatric/Behavioral:  Positive for agitation. Negative for confusion.      Objective:     Vital Signs (Most Recent):  Temp: 98.1 °F (36.7 °C) (09/21/24 0807)  Pulse: 70 (09/21/24 1113)  Resp: 18 (09/21/24 0840)  BP: (!) 112/57 (09/21/24 0807)  SpO2: 96 % (09/21/24 1100) Vital Signs (24h Range):  Temp:  [98 °F (36.7 °C)-98.7 °F (37.1 °C)] 98.1 °F (36.7 °C)  Pulse:  [68-75] 70  Resp:  [12-18] 18  SpO2:  [96 %-97 %] 96 %  BP: (112-127)/(57-69) 112/57     Weight: 79.4 kg (175 lb)  Body mass index is 28.25 kg/m².  No intake or output data in the 24 hours ending 09/21/24 1132      Physical Exam  Cardiovascular:      Rate and Rhythm: Normal rate and regular rhythm.   Pulmonary:      Effort: Pulmonary effort is normal.   Abdominal:      General: There is distension.   Musculoskeletal:         General: Tenderness present.      Right foot: Swelling present.      Left foot: Swelling present.      Comments: Pronounced arches, swelling in feet  Difficult to assess for edema given the patient's sensitivity to touch   Skin:     General: Skin is warm.   Neurological:      Mental Status: He is alert. He is disoriented.             Significant Labs: All pertinent labs within the past 24 hours have been reviewed.    Significant Imaging: I have reviewed all pertinent imaging results/findings within the past 24 hours.

## 2024-09-21 NOTE — HOSPITAL COURSE
Mr. Diana arrived to [facility] with altered mental status and was started on empiric antibiotics and lactulose. CT abd pelvis showed moderate to large colonic stool burden may reflect constipation, but to a lesser degree from 07/27/2024 study and elevated ammonia to 81. Liver ultrasound with doppler showed patent allograft and no signs of portal vein thrombosis. By Day 2 admission, he had 3 bowel movements with improved mentation. Polypharmacy was discussed with Mr. Diana and he was adamant about continuing his current regimen. Further follow up is recommended outpatient for polypharmacy at [facility] and mood/behavior to be addressed with PCP.

## 2024-09-21 NOTE — PLAN OF CARE
[facility] - Stepdown Flex ([place])  Discharge Final Note    Primary Care Provider: Elo Bull MD    Expected Discharge Date: 9/21/2024    Patient to be discharged home.  The patient does not have any home needs.  Family to provide transportation home.      Elo Bull MD PCP - General Family Medicine [phone] [facility] [address] [facility] [address]      Next Steps: Schedule an appointment as soon as possible for a visit in 1 week(s)  Instructions: hospital follow up Call and schedule a hospital follow up within 7 days of discharge.       Future Appointments   Date Time Provider Department Center   10/23/2024 11:00 AM Laura Morgan MD [facility]        Final Discharge Note (most recent)       Final Note - 09/21/24 1429          Final Note    Assessment Type Final Discharge Note     Anticipated Discharge Disposition Home or Self Care        Post-Acute Status    Post-Acute Authorization Other     Other Status No Post-Acute Service Needs     Discharge Delays None known at this time                     Important Message from Medicare

## 2024-09-21 NOTE — DISCHARGE SUMMARY
[facility] - Stepdown [facility] ([place] [address])  [facility] Medicine  Discharge Summary      Patient Name: Jhonny Diana  MRN: [id number]  NISHI: [id number]  Patient Class: IP- Inpatient  Admission Date: 9/19/2024  Hospital Length of Stay: 2 days  Discharge Date and Time:  09/21/2024 2:30 PM  Attending Physician: Antony Vasquez MD   Discharging Provider: Yamila Carter MD  Primary Care Provider: Elo Bull MD  [facility] Medicine Team: [facility] 2 Yamila Carter MD  Primary Care Team: [facility]    HPI:   Mr. Diana is a 34 year-old male with a pmhx of history of liver disease s/p liver transplant 2017 on tacrolimus .5 mg BID and sirolimus 1mg qd, ESRD on dialysis last dialysis in 2018, presenting to the ED with AMS. Change started last night (9/18), mother noticed he would not respond appropriately to questions 9/19 AM, noted that the last time this happened he came into the hospital with pneumonia.  The patient states he was preparing for surgery to repair a hernia, and he was on 1mg tacrolimus and two nights ago started .5 mg tacrolimus. He describes diffuse pain throughout limbs. His mother was at bedside and administers his medications. Patient states that he was recently discontinued on prednisone after there was concern for rejection. She does not endorse any missed medications or taking additional doses of medications. There are no sick contacts and recently traveled to [place] to see a friend a few weeks ago.         ED initial workup:  CBC: WBC 4.77 H/H: 11/33.8, platelets: 70  CMP: 145, 4, 119, 16, 32/2.2 (baseline 1.7), tbili: 2.5 , elevated Alk phos 143, normal AST/ALT  Magnesium: 1.5  Ammonia: 81  Blood cultures drawn   Drug screen: + benzodiazepines, opiates, THC  UA: trace protein, trace occult blood  Tacrolimus level ordered    Imaging:     CT abd pelvis: Moderate to large colonic stool burden may reflect constipation, but to a lesser degree from 07/27/2024 study.  No convincing evidence  of bowel obstruction or acute bowel inflammation. Continued small right pleural effusion, slightly increased in volume from 07/27/2024 study. Previous small volume unencapsulated fluid within the anterior midline abdominal wall supraumbilical region is decreased in amount.  Interval decreased fat stranding of the supraumbilical ventral abdominal wall.     Cxr: Slight interval increased right more than left diffuse nonspecific interstitial coarsening which may reflect pulmonary edema versus interstitial type pneumonia. Suspected ongoing/residual small right pleural effusion. No consolidative process seen.     Ct head w/o contrast:No evidence of acute hemorrhage or major vascular distribution infarct.     Started on vancomycin, zosyn, and lactulose.       * No surgery found *      Hospital Course:   Mr. Diana arrived to [facility] with altered mental status and was started on empiric antibiotics and lactulose. CT abd pelvis showed moderate to large colonic stool burden may reflect constipation, but to a lesser degree from 07/27/2024 study and elevated ammonia to 81. Liver ultrasound with doppler showed patent allograft and no signs of portal vein thrombosis. By Day 2 admission, he had 3 bowel movements with improved mentation.      Goals of Care Treatment Preferences:  Code Status: Full Code    Living Will: Yes                 Consults:   Consults (From admission, onward)          Status Ordering Provider     Inpatient consult to PICC team (NIAS)  Once        Provider:  (Not yet assigned)    Completed SYBIL DALEY     Inpatient consult to Hepatology  Once        Provider:  (Not yet assigned)    Completed LUIS FERNANDO VENEGAS            No new Assessment & Plan notes have been filed under this hospital service since the last note was generated.  Service: Hospital Medicine    Final Active Diagnoses:    Diagnosis Date Noted POA    PRINCIPAL PROBLEM:  Hepatic encephalopathy [K76.82] 09/28/2017 Yes    Chronic pain [R52] 09/19/2024  Yes    Thrombocytopenia [D69.6] 05/12/2018 Yes    History of liver transplant [Z94.4] 10/20/2017 Not Applicable      Problems Resolved During this Admission:       Discharged Condition: stable    Disposition: Home or Self Care    Follow Up:    Patient Instructions:      Ambulatory referral/consult to [facility]   Standing Status: Future   Referral Priority: Routine Referral Type: Consultation   Referral Reason: Specialty Services Required   Requested Specialty: Pain Medicine   Number of Visits Requested: 1     Ambulatory referral/consult to Pain Clinic   Standing Status: Future   Referral Priority: Routine Referral Type: Consultation   Referral Reason: Specialty Services Required   Requested Specialty: Pain Medicine   Number of Visits Requested: 1     Ambulatory referral/consult to Family Practice   Standing Status: Future   Referral Priority: Routine Referral Type: Consultation   Referral Reason: Specialty Services Required   Requested Specialty: Family Medicine   Number of Visits Requested: 1     Notify your health care provider if you experience any of the following:  temperature >100.4     Notify your health care provider if you experience any of the following:  persistent nausea and vomiting or diarrhea     Notify your health care provider if you experience any of the following:  severe uncontrolled pain     Notify your health care provider if you experience any of the following:  difficulty breathing or increased cough     Notify your health care provider if you experience any of the following:  redness, tenderness, or signs of infection (pain, swelling, redness, odor or green/yellow discharge around incision site)     Notify your health care provider if you experience any of the following:  increased confusion or weakness       Significant Diagnostic Studies: N/A    Pending Diagnostic Studies:       Procedure Component Value Units Date/Time    CMV DNA, quantitative, PCR [[id number]]  Collected: 09/20/24 1142    Order Status: Sent Lab Status: In process Updated: 09/20/24 1232    Specimen: Blood     Herpes simplex Virus (HSV) Type 1 & 2 DNA by PCR [[id number]] Collected: 09/20/24 1142    Order Status: Sent Lab Status: In process Updated: 09/20/24 1232    Specimen: Blood     Phosphatidylethanol (PETH) [[id number]] Collected: 09/21/24 0516    Order Status: Sent Lab Status: In process Updated: 09/21/24 0558    Specimen: Blood            Medications:  Reconciled Home Medications:      Medication List        START taking these medications      lactulose 10 gram/15 mL solution  Commonly known as: CHRONULAC  Take 30 mLs (20 g total) by mouth 3 (three) times daily.     MOVANTIK 12.5 mg Tab  Generic drug: naloxegoL  Take 12.5 mg by mouth once daily.            CONTINUE taking these medications      diazePAM 2 MG tablet  Commonly known as: VALIUM  Take 2 mg by mouth 2 (two) times a day.     naloxone 4 mg/actuation Spry  Commonly known as: NARCAN  CALL 911. SPR CONTENTS OF ONE SPRAYER (0.1ML) INTO ONE NOSTRIL. REPEAT IN 2-3 MIN IF SYMPTOMS OF OPIOID EMERGENCY PERSIST, ALTERNATE NOSTRILS     oxyCODONE 15 MG Tab  Commonly known as: ROXICODONE  Take 10 mg by mouth 3 (three) times daily.     RELISTOR 150 mg Tab  Generic drug: methylnaltrexone     sirolimus 1 MG Tab  Commonly known as: RAPAMUNE  Take 1 tablet (1 mg total) by mouth once daily.     tacrolimus 0.5 MG Cap  Commonly known as: PROGRAF  Take 1 capsule (0.5 mg total) by mouth every 12 (twelve) hours.     tiZANidine 4 mg Cap  Take 1 tablet by mouth daily as needed.     torsemide 10 MG Tab  Commonly known as: DEMADEX            STOP taking these medications      predniSONE 10 MG tablet  Commonly known as: DELTASONE            ASK your doctor about these medications      valACYclovir 500 MG tablet  Commonly known as: VALTREX  Take by mouth once daily.              Indwelling Lines/Drains at time of discharge:   Lines/Drains/Airways       None                    Time spent on the discharge of patient: 35 minutes         Yamila Carter MD  Department of Hospital Medicine  [facility] - Stepdown Flex ([place])

## 2024-09-23 LAB
CMV DNA SPEC QL NAA+PROBE: NORMAL
CYTOMEGALOVIRUS PCR, QUANT: NOT DETECTED IU/ML
HSV-1 DNA BY PCR: NEGATIVE
HSV-2 DNA BY PCR: NEGATIVE

## 2024-09-24 ENCOUNTER — TELEPHONE (OUTPATIENT)
Dept: TRANSPLANT | Facility: CLINIC | Age: 34
End: 2024-09-24
Payer: MEDICARE

## 2024-09-24 LAB
BACTERIA BLD CULT: NORMAL
BACTERIA BLD CULT: NORMAL

## 2024-09-24 NOTE — TELEPHONE ENCOUNTER
Spoke with patient via telephone for f/u after hospital discharge.  Pt denies questions about discharge medications.  Pt made aware that Dr. Morgan would like to keep f/u appt 10/23/24.  Pt instructed to get labs 9/30/24.  Pt v/u and states will comply.

## 2024-09-25 LAB
CLINICAL BIOCHEMIST REVIEW: NORMAL
PLPETH BLD-MCNC: <10 NG/ML
POPETH BLD-MCNC: <10 NG/ML

## 2024-10-01 LAB
EXT ALBUMIN: 3.1
EXT ALKALINE PHOSPHATASE: 174
EXT ALT: 24
EXT AST: 42
EXT BASOPHIL%: 0.7
EXT BILIRUBIN TOTAL: 1.6
EXT BUN: 22
EXT CALCIUM: 8.6
EXT CHLORIDE: 112
EXT CO2: 18
EXT CREATININE: 1.87 MG/DL
EXT EGFR NO RACE VARIABLE: 47
EXT EOSINOPHIL%: 3.6
EXT GLUCOSE: 93
EXT HEMATOCRIT: 30.9
EXT HEMOGLOBIN: 10.8
EXT LYMPH%: 39.8
EXT MONOCYTES%: 13.5
EXT PLATELETS: 108
EXT POTASSIUM: 3.4
EXT PROTEIN TOTAL: 6.3
EXT SEGS%: 42.4
EXT SIROLIMUS LVL: 5.7
EXT SODIUM: 139 MMOL/L
EXT TACROLIMUS LVL: 4.7
EXT WBC: 3.2

## 2024-10-06 NOTE — PROGRESS NOTES
[facility]  Infectious Disease  Progress Note    Patient Name: Jhonny Diana  MRN: [id number]  Admission Date: 9/27/2017  Length of Stay: 8 days  Attending Physician: Sajan Sandra MD  Primary Care Provider: Provider Notinsystem    Isolation Status: Special Contact  Assessment/Plan:      * Alcoholic hepatitis with ascites    26yo man w/a history of childhood asthma and alcohol abuse (fifth whiskey daily >10yrs, active through admission) who was admitted on 9/27/2017 as a transfer from an OSH (first admitted 9/6) with 6-8wk progressively worsening fatigue, jaundice, abdominal pain, distension, BLE edema, and 20lb weight gain due to decompensated liver failure and recently diagnosed alcoholic cirrhosis (c/b HE, EV, portal HTN, scant ascites, no SBP, HRS). ID has been consulted to comment on a persistent leukocytosis and transplant candidacy. He remains critically ill at this time.    - may continue zosyn for now but will likely deescalate soon (well covered for possibility of aspiration seen on imaging)  - with decline, would consider empiric addition of fluconazole  - await pending paracentesis studies  - workup for leukocytosis has been appropriate and assuming unremarkable paracentesis agree with hepatology that this may well be due to advanced liver failure   - transplant prep: will review serologic workup when finalized as much is still pending; will need vaccines updated including HAV, HBV, prevnar, and influenza  - transplant candidacy: pending review of workup detailed above; await final psych evaluation            Anticipated Disposition: pending improvement    Thank you for your consult. I will follow-up with patient. Please contact us if you have any additional questions.     Melanie Bergman MD  Transplant ID Attending  [id number]    Melanie Bergman MD  Infectious Disease  [facility]    Subjective:     Principal Problem:Alcoholic hepatitis with ascites    HPI: No  [FreeTextEntry1] : This note was written by Ania Barton on 10/02/2024 acting as medical scribe for Dr. Bladimir Ramirez. I, Dr. Bladimir Ramirez, have read and attest that all the information, medical decision making and discharge instructions within are true and accurate.  notes on file  Interval History: Slight improvement today per mother. Afebrile. WBC remains elevated.    Review of Systems   Unable to perform ROS: Mental status change     Objective:     Vital Signs (Most Recent):  Temp: 98.8 °F (37.1 °C) (10/05/17 0800)  Pulse: 103 (10/05/17 1500)  Resp: (!) 23 (10/05/17 1500)  BP: 138/67 (10/05/17 1500)  SpO2: 100 % (10/05/17 1400) Vital Signs (24h Range):  Temp:  [98.1 °F (36.7 °C)-98.8 °F (37.1 °C)] 98.8 °F (37.1 °C)  Pulse:  [103-113] 103  Resp:  [20-32] 23  SpO2:  [98 %-100 %] 100 %  BP: (110-150)/(56-81) 138/67     Weight: 83.4 kg (183 lb 13.8 oz)  Body mass index is 28.8 kg/m².    Estimated Creatinine Clearance: 94.6 mL/min (based on SCr of 1.2 mg/dL).    Physical Exam   Constitutional: He appears well-developed. He is cooperative.  Non-toxic appearance.   HENT:   Head: Normocephalic.   Eyes: Pupils are equal, round, and reactive to light. Scleral icterus is present.   Neck: Normal range of motion.   Cardiovascular: Regular rhythm, normal heart sounds and normal pulses.  Tachycardia present.    Pulmonary/Chest: He has decreased breath sounds in the right lower field and the left lower field.   Abdominal: Normal appearance. He exhibits distension. Bowel sounds are decreased.   Musculoskeletal: Normal range of motion.   Neurological: He is disoriented.   Patient follows commands but needs restraints to prevent removal of NGT(removed x 2 in last 24hrs).   Also removed condom cath and does not urinate in a urinal  Reportedly more awake to day   Skin: Skin is warm and dry.   Old blister/ wart on right plantar surface of the foot near the big toe   Psychiatric: His affect is labile. His speech is delayed. Cognition and memory are impaired. He is inattentive.   Nursing note and vitals reviewed.      Significant Labs:   CBC:   Recent Labs  Lab 10/04/17  0247 10/05/17  0239   WBC 26.00* 29.96*   HGB 11.2* 10.3*   HCT 33.2* 29.6*   * 126*     CMP:   Recent Labs  Lab  10/04/17  0534 10/04/17  1752 10/05/17  0239     --  138   K 3.4* 4.0 3.8   *  --  112*   CO2 14*  --  15*   *  --  120*   BUN 32*  --  33*   CREATININE 1.3  --  1.2   CALCIUM 8.4*  --  8.2*   PROT 5.8*  --  5.6*   ALBUMIN 2.3*  --  2.1*   BILITOT 31.3*  --  29.8*   ALKPHOS 141*  --  150*   *  --  109*   ALT 69*  --  66*   ANIONGAP 14  --  11   EGFRNONAA >60.0  --  >60.0       Significant Imaging: I have reviewed all pertinent imaging results/findings within the past 24 hours.     CT CAP: Hepatomegaly.      Small volume ascites, mesenteric edema, portal colopathy, anasarca and mild splenomegaly suggesting sequela of portal hypertension.      Patchy confluent groundglass opacities throughout both lower lungs, probably edema, aspiration, or pneumonia.    Prominent loops of small bowel containing fecal material mixed with scattered gas density suggesting slow transit.     Microbiology:  9/28 blood cx: negative   9/28 urine cx: negative  10/3 blood cx: negative  10/3 C.diff negative  10/4 SCrAg negative

## 2024-10-07 ENCOUNTER — TELEPHONE (OUTPATIENT)
Dept: TRANSPLANT | Facility: CLINIC | Age: 34
End: 2024-10-07
Payer: MEDICARE

## 2024-10-07 NOTE — TELEPHONE ENCOUNTER
Called patient and reviewed labs and medication changes.  Pt instructed to discontinue sirolimus in preparation for upcoming surgery.  Will repeat labs 10/14/24.      ----- Message from Laura Morgan MD sent at 10/7/2024  1:37 PM CDT -----  Labs reviewed.     Let's stop sirolimus given plans for abdominal surgery in near future. No changes to tacro dosing. Repeat labs in 1 week.    Thank you.

## 2024-10-08 ENCOUNTER — PATIENT MESSAGE (OUTPATIENT)
Dept: TRANSPLANT | Facility: CLINIC | Age: 34
End: 2024-10-08
Payer: MEDICARE

## 2024-10-17 LAB
EXT ALBUMIN: 3.5
EXT ALKALINE PHOSPHATASE: 228
EXT ALT: 21
EXT AST: 56
EXT BASOPHIL%: 1
EXT BILIRUBIN TOTAL: 2.4
EXT BUN: 21
EXT CALCIUM: 9.1
EXT CHLORIDE: 106
EXT CO2: 21
EXT CREATININE: 2.23 MG/DL
EXT EGFR NO RACE VARIABLE: 38
EXT EOSINOPHIL%: 6
EXT GLUCOSE: 91
EXT HEMATOCRIT: 37.7
EXT HEMOGLOBIN: 13.3
EXT LYMPH%: 36.6
EXT MONOCYTES%: 10.5
EXT PLATELETS: 84
EXT POTASSIUM: 3.8
EXT PROTEIN TOTAL: 7.2
EXT SEGS%: 45.9
EXT SODIUM: 139 MMOL/L
EXT TACROLIMUS LVL: 8.8
EXT WBC: 4.1

## 2024-10-18 ENCOUNTER — TELEPHONE (OUTPATIENT)
Dept: TRANSPLANT | Facility: CLINIC | Age: 34
End: 2024-10-18
Payer: MEDICARE

## 2024-10-18 NOTE — TELEPHONE ENCOUNTER
Labs reviewed via portal and are stable.  Patient will repeat labs on 10/21/24.        ----- Message from Laura Morgan MD sent at 10/17/2024  4:29 PM CDT -----  Labs reviewed. No changes. Repeat labs in 1 week. Thank you.

## 2024-10-21 ENCOUNTER — PATIENT MESSAGE (OUTPATIENT)
Dept: TRANSPLANT | Facility: CLINIC | Age: 34
End: 2024-10-21
Payer: MEDICARE

## 2024-10-21 ENCOUNTER — TELEPHONE (OUTPATIENT)
Dept: TRANSPLANT | Facility: CLINIC | Age: 34
End: 2024-10-21
Payer: MEDICARE

## 2024-10-21 NOTE — TELEPHONE ENCOUNTER
Spoke with patient regarding upcoming transplant hepatology appt and is inquiring if virtual visit is okay.  Message has been sent to Dr. Morgan and will respond to pt via the portal as requested.    Also reminded pt that he was to discontinue sirolimus 10/7/24.  Sirolimus level was received today of 7.3.  Pt states will comply.

## 2024-10-23 ENCOUNTER — OFFICE VISIT (OUTPATIENT)
Dept: TRANSPLANT | Facility: CLINIC | Age: 34
End: 2024-10-23
Payer: MEDICARE

## 2024-10-23 VITALS
OXYGEN SATURATION: 95 % | DIASTOLIC BLOOD PRESSURE: 63 MMHG | BODY MASS INDEX: 25.54 KG/M2 | HEART RATE: 73 BPM | HEIGHT: 66 IN | TEMPERATURE: 97 F | WEIGHT: 158.94 LBS | SYSTOLIC BLOOD PRESSURE: 133 MMHG | RESPIRATION RATE: 18 BRPM

## 2024-10-23 DIAGNOSIS — Z91.89 AT RISK FOR OPPORTUNISTIC INFECTIONS: ICD-10-CM

## 2024-10-23 DIAGNOSIS — R41.82 ALTERED MENTAL STATUS, UNSPECIFIED ALTERED MENTAL STATUS TYPE: ICD-10-CM

## 2024-10-23 DIAGNOSIS — F10.21 ALCOHOL USE DISORDER, SEVERE, IN SUSTAINED REMISSION: ICD-10-CM

## 2024-10-23 DIAGNOSIS — Z94.4 HISTORY OF LIVER TRANSPLANT: ICD-10-CM

## 2024-10-23 DIAGNOSIS — R74.8 ELEVATED LIVER ENZYMES: ICD-10-CM

## 2024-10-23 DIAGNOSIS — Z79.60 LONG-TERM USE OF IMMUNOSUPPRESSANT MEDICATION: Chronic | ICD-10-CM

## 2024-10-23 PROBLEM — K76.82 HEPATIC ENCEPHALOPATHY: Status: RESOLVED | Noted: 2017-09-28 | Resolved: 2024-10-23

## 2024-10-23 LAB
EXT ALBUMIN: 3.6
EXT ALKALINE PHOSPHATASE: 191
EXT ALT: 20
EXT AMMONIA LEVEL: 94
EXT AST: 54
EXT BASOPHIL%: 0.4
EXT BILIRUBIN TOTAL: 2.5
EXT BUN: 30
EXT CALCIUM: 9.2
EXT CHLORIDE: 104
EXT CO2: 23
EXT CREATININE: 2.98 MG/DL
EXT EGFR NO RACE VARIABLE: 27
EXT EOSINOPHIL%: 6.3
EXT GLUCOSE: 103
EXT HEMATOCRIT: 38.3
EXT HEMOGLOBIN: 13.7
EXT LYMPH%: 43
EXT MONOCYTES%: 11.6
EXT PLATELETS: 88
EXT POTASSIUM: 3.9
EXT PROTEIN TOTAL: 7.4
EXT SEGS%: 38.6
EXT SODIUM: 138 MMOL/L
EXT TACROLIMUS LVL: 13.8
EXT WBC: 3.9

## 2024-10-23 PROCEDURE — 99214 OFFICE O/P EST MOD 30 MIN: CPT | Mod: S$GLB,,, | Performed by: INTERNAL MEDICINE

## 2024-10-23 PROCEDURE — 1160F RVW MEDS BY RX/DR IN RCRD: CPT | Mod: CPTII,S$GLB,, | Performed by: INTERNAL MEDICINE

## 2024-10-23 PROCEDURE — 3008F BODY MASS INDEX DOCD: CPT | Mod: CPTII,S$GLB,, | Performed by: INTERNAL MEDICINE

## 2024-10-23 PROCEDURE — 99999 PR PBB SHADOW E&M-EST. PATIENT-LVL III: CPT | Mod: PBBFAC,,, | Performed by: INTERNAL MEDICINE

## 2024-10-23 PROCEDURE — 3078F DIAST BP <80 MM HG: CPT | Mod: CPTII,S$GLB,, | Performed by: INTERNAL MEDICINE

## 2024-10-23 PROCEDURE — 3075F SYST BP GE 130 - 139MM HG: CPT | Mod: CPTII,S$GLB,, | Performed by: INTERNAL MEDICINE

## 2024-10-23 PROCEDURE — 1159F MED LIST DOCD IN RCRD: CPT | Mod: CPTII,S$GLB,, | Performed by: INTERNAL MEDICINE

## 2024-10-23 NOTE — Clinical Note
Doing ok.  No changes to Tac 0.5/0.5. Stopped sirolimus on 10/22/24.  Repeat labs in 1 week. RTC in 3 months.

## 2024-10-23 NOTE — PROGRESS NOTES
Transplant Hepatology  Liver Transplant Recipient Follow Up    PCP: Elo Bull MD    Transplant History  Transplant Date: 10/19/2017  UNOS Native Liver Dx: Acute Alcoholic Hepatitis    ORGAN: LIVER  Whole or Partial: whole liver  Donor Type: donation after brain death  [facility] High Risk: no  Donor CMV Status: Positive  Donor HCV Status: Negative  Donor HBcAb: Negative  Donor HBV ANITA:   Donor HCV ANITA: Negative  Biliary Anastomosis: end to end  Arterial Anatomy: standard  IVC reconstruction: end to end ivc  Portal vein status: patent    He has had the following complications since transplant: LAURA previously on HD, anastomotic stricture, ETOH relapse (6/2019).     Chief complaint: follow up, history of OLT    HPI:  Jhonny Diana is a 34 y.o. male with history of OLT in 10/2017 for alcohol related cirrhosis / acute alcohol related hepatitis who presents for follow up.     Last seen in Transplant Hepatology Clinic on 8/21/2024.    Interval Event:  - Admitted to [facility] for [facility]MS in Sept 2024. Infectious workup was negative. PETH negative. Utox +benzos, opiates, THC. Ammonia mildly elevated, and prescribed lactulose.   - Accompanied by mother, Elvira.   - Feels ok. States cognition is back at baseline. Denies acute complaints.  - Continuing to take lactulose TID. Having 1 BM qd or QOD.  - Currently taking Tac 0.5/0.5. Stopped taking sirolimus 1mg qd.     Past Medical History:   Diagnosis Date    Abdominal pain 05/27/2018    Alcoholic cirrhosis 08/14/2018    Alcoholic hepatitis with ascites     Anemia     CKD (chronic kidney disease) stage 3, GFR 30-59 ml/min 08/14/2018    Encounter for blood transfusion     multiple blood transfusions    ESRD on dialysis     Herpes zoster without complication 12/11/2019    History of hematemesis 09/28/2017    Hypertension     Seizures     Substance abuse     alcohol and opioids    Thrombocytopenia     Transplanted liver     10/2017       Past Surgical History:   Procedure  Laterality Date    APPENDECTOMY      CENTRAL VENOUS CATHETER TUNNELED INSERTION DOUBLE LUMEN      inserted and removed    ERCP      ERCP N/A 2018    Procedure: ERCP;  Surgeon: Solis Villanueva MD;  [facility]  Service: Endoscopy;  Laterality: N/A;  dialysis/cirrhosis/labs prior to ERCP/svn  platelet    ESOPHAGOGASTRODUODENOSCOPY      ESOPHAGOGASTRODUODENOSCOPY N/A 2019    Procedure: EGD (ESOPHAGOGASTRODUODENOSCOPY);  Surgeon: Sheri Siddiqui MD;  Location: [facility] ([address] [place] [address]);  Service: Endoscopy;  Laterality: N/A;    LIVER TRANSPLANT      10/2017       Family History   Problem Relation Name Age of Onset    No Known Problems Mother      Cancer Father          Lung cancer    Alcohol abuse Father      No Known Problems Sister 3     No Known Problems Brother 1     Kidney disease Neg Hx         Social History     Tobacco Use    Smoking status: Former     Current packs/day: 0.00     Average packs/day: 1 pack/day for 10.0 years (10.0 ttl pk-yrs)     Types: Cigarettes     Start date: 2007     Quit date: 2017     Years since quittin.6    Smokeless tobacco: Never   Substance Use Topics    Alcohol use: No     Comment: a fifth of liquor daily for years, cut back over last 2 months    Drug use: No       Current Outpatient Medications   Medication Sig Dispense Refill    oxyCODONE (ROXICODONE) 15 MG Tab Take 10 mg by mouth 3 (three) times daily.      tacrolimus (PROGRAF) 0.5 MG Cap Take 1 capsule (0.5 mg total) by mouth every 12 (twelve) hours. 60 capsule 11    tiZANidine 4 mg Cap Take 1 tablet by mouth daily as needed.      torsemide (DEMADEX) 10 MG Tab       valACYclovir (VALTREX) 500 MG tablet Take by mouth once daily.      diazePAM (VALIUM) 2 MG tablet Take 2 mg by mouth 2 (two) times a day.      lactulose (CHRONULAC) 10 gram/15 mL solution Take 30 mLs (20 g total) by mouth 3 (three) times daily. 630 mL 0    methylnaltrexone (RELISTOR) 150 mg Tab       naloxegoL (MOVANTIK) 12.5 mg  "Tab Take 12.5 mg by mouth once daily. 30 tablet 0    naloxone (NARCAN) 4 mg/actuation [place] CALL 911. SPR CONTENTS OF ONE SPRAYER (0.1ML) INTO ONE NOSTRIL. REPEAT IN 2-3 MIN IF SYMPTOMS OF OPIOID EMERGENCY PERSIST, ALTERNATE NOSTRILS       No current facility-administered medications for this visit.       Review of patient's allergies indicates:   Allergen Reactions    Bactrim [sulfamethoxazole-trimethoprim] Other (See Comments)     Mookie Trell Syndrome            Vitals:    10/23/24 1054   BP: 133/63   Pulse: 73   Resp: 18   Temp: 97.3 °F (36.3 °C)   TempSrc: Temporal   SpO2: 95%   Weight: 72.1 kg (158 lb 15.2 oz)   Height: 5' 6" (1.676 m)   Body mass index is 25.66 kg/m².    Physical Exam  Constitutional:       General: He is not in acute distress.     Appearance: He is not toxic-appearing.   Eyes:      General: No scleral icterus.  Cardiovascular:      Rate and Rhythm: Normal rate.   Pulmonary:      Effort: Pulmonary effort is normal.   Abdominal:      General: There is no distension.      Palpations: There is no fluid wave.      Tenderness: There is generalized abdominal tenderness.   Musculoskeletal:         General: No swelling.   Skin:     Coloration: Skin is not jaundiced.   Neurological:      General: No focal deficit present.      Mental Status: He is alert and oriented to person, place, and time. Mental status is at baseline.   Psychiatric:         Thought Content: Thought content normal.         LABS:  Lab Results   Component Value Date    WBC 4.08 09/21/2024    HGB 10.2 (L) 09/21/2024    HCT 29.5 (L) 09/21/2024    MCV 97 09/21/2024    PLT 67 (L) 09/21/2024       Lab Results   Component Value Date     09/21/2024    K 4.2 09/21/2024     (H) 09/21/2024    CO2 17 (L) 09/21/2024    BUN 24 (H) 09/21/2024    CREATININE 2.0 (H) 09/21/2024    CALCIUM 8.6 (L) 09/21/2024    ANIONGAP 8 09/21/2024    ESTGFRAFRICA 53.5 (A) 12/11/2019    EGFRNONAA 46.3 (A) 12/11/2019       Lab Results   Component Value " Date    ALT 22 09/21/2024    AST 43 (H) 09/21/2024     (H) 10/26/2017    ALKPHOS 131 09/21/2024    BILITOT 2.0 (H) 09/21/2024       Lab Results   Component Value Date    TACROLIMUS 6.4 09/21/2024         Assessment:   Jhonny Diana is a 34 y.o. male with history of OLT in 10/2017 for alcohol related cirrhosis / acute alcohol related hepatitis who presents for follow up.    1. History of liver transplant    2. Elevated liver enzymes    3. Long-term use of immunosuppressant medication    4. At risk for opportunistic infections    5. Alcohol use disorder, severe, in sustained remission    6. Altered mental status, unspecified altered mental status type      Recommendations:  Allograft Function  - Elevated LFTs in July 2024 of unclear etiology. Presumed to be 2/2 rejection.  - Infectious workup was negative  - Underwent liver biopsy on 8/5/2024, which demonstrated mild - mod nonspecific lobular hepatitis. CMV, HBV, EBV stains negative on biopsy.   - LFTs improved 10/22/24  - Continue UDCA 300mg BID given hx biliary stricture, elev ALP    Immunosuppression   - Continue Tac 0.5/0.5, goal 6-8  - Stopped sirolimus on 10/22/24 givens plans for hernia repair     Kidney function   - Stable creatinine 2.98 on 10/22/24   - Avoid NSAIDs as able and maintain adequate hydration    Altered Mental Status: Resolved. Unclear etiology. Admitted to [facility] [place] [facility]MS in Sept 2024. Infectious workup was negative. PETH negative. Utox +benzos, opiates, THC. Ammonia mildly elevated, and prescribed lactulose. Continuing to take lactulose TID. Having 1 BM qd or QOD. Discussed with the patient that I have low suspicion for hepatic encephalopathy given recent liver without evidence of cirrhosis (Aug 2024).     Health Maintenance/Screening:  - Recommend age appropriate cancer screening  - Skin cancer: Recommend use of sunscreen SPF 30 or higher, hat and sunglasses while outside, dermatologist visit annually or sooner if any concerning  lesions.  - Osteoporosis: Recommend bone density testing every 2-3 years if previously normal or annually if previously abnormal.    Return to clinic in 3 months.    UNOS Patient Status  Functional Status: 70% - Cares for self: unable to carry on normal activity or active work  Physical Capacity: Limited Mobility    I spent a total of 30 minutes on the day of the visit. This includes face to face time and non-face to face time preparing to see the patient (eg, review of tests), obtaining and/or reviewing separately obtained history, documenting clinical information in the electronic or other health record, independently interpreting results, and communicating results to the patient/family/caregiver, or care coordination.    Laura Morgan MD  Staff Physician  Hepatology and Liver Transplant  [facility]  [facility]

## 2024-10-23 NOTE — LETTER
October 23, 2024                      [facility]  [address]  [facility] [address]  Phone: [phone]   Patient: Jhonny Diana   MR Number: [id number]   YOB: 1990   Date of Visit: 10/23/2024       Dear       Thank you for referring Jhonny Diana to me for evaluation. Attached you will find relevant portions of my assessment and plan of care.    If you have questions, please do not hesitate to call me. I look forward to following Jhonny Diana along with you.    Sincerely,    Laura Morgan MD    Enclosure    If you would like to receive this communication electronically, please contact [email or web address] or [phone] to request [employer] Link access.    [employer] Link is a tool which provides read-only access to select patient information with whom you have a relationship. Its easy to use and provides real time access to review your patients record including encounter summaries, notes, results, and demographic information.    If you feel you have received this communication in error or would no longer like to receive these types of communications, please e-mail [email or web address]

## 2024-10-25 ENCOUNTER — TELEPHONE (OUTPATIENT)
Dept: TRANSPLANT | Facility: CLINIC | Age: 34
End: 2024-10-25
Payer: MEDICARE

## 2024-10-25 NOTE — TELEPHONE ENCOUNTER
Labs reviewed via portal.  Pt will repeat labs next week.    ----- Message from Laura Morgan MD sent at 10/25/2024  7:55 AM CDT -----  Patient thinks he may have taken tac 1/1 instead of 0.5/0.5.     No changes to Tac 0.5/0.5. Repeat labs in 1 week. No need to trend ammonia. Thank you.

## 2024-10-28 ENCOUNTER — TELEPHONE (OUTPATIENT)
Dept: SURGERY | Facility: CLINIC | Age: 34
End: 2024-10-28
Payer: MEDICARE

## 2024-10-28 ENCOUNTER — PATIENT MESSAGE (OUTPATIENT)
Dept: SURGERY | Facility: CLINIC | Age: 34
End: 2024-10-28
Payer: MEDICARE

## 2024-10-28 DIAGNOSIS — K40.20 NON-RECURRENT BILATERAL INGUINAL HERNIA WITHOUT OBSTRUCTION OR GANGRENE: Primary | ICD-10-CM

## 2024-10-29 LAB
EXT ALBUMIN: 3.3
EXT ALKALINE PHOSPHATASE: 176
EXT ALT: 20
EXT AST: 51
EXT BASOPHIL%: 0.5
EXT BILIRUBIN TOTAL: 2.1
EXT BUN: 28
EXT CALCIUM: 9.8
EXT CHLORIDE: 111
EXT CO2: 20
EXT CREATININE: 2.38 MG/DL
EXT EGFR NO RACE VARIABLE: 36
EXT EOSINOPHIL%: 4.5
EXT GLUCOSE: 97
EXT HEMATOCRIT: 34.2
EXT HEMOGLOBIN: 12.2
EXT LYMPH%: 42
EXT MONOCYTES%: 11.5
EXT PLATELETS: 55
EXT POTASSIUM: 4.4
EXT PROTEIN TOTAL: 6.5
EXT SEGS%: 41.5
EXT SODIUM: 139 MMOL/L
EXT TACROLIMUS LVL: 8.3
EXT WBC: 3.2

## 2024-10-30 ENCOUNTER — TELEPHONE (OUTPATIENT)
Dept: TRANSPLANT | Facility: CLINIC | Age: 34
End: 2024-10-30
Payer: MEDICARE

## 2024-11-09 NOTE — ASSESSMENT & PLAN NOTE
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  - Tbili with increase prompting ERCP 1/30 with stones and sludge, stents placed.  - Repeat ERCP 2/19 as was having recurrent fevers with bilirubin stuck at 2.0-2.2 with overall unremarkable findings seen. Did place new larger stents.   - ERCP/EUS 3/9 with metal stent placed in bile duct, stones removed  - Tbili remains stable. Continue actigall.   No assistance needed

## 2024-11-12 LAB
EXT ALBUMIN: 3.4
EXT ALKALINE PHOSPHATASE: 179
EXT ALT: 18
EXT AST: 33
EXT BASOPHIL%: 0.5
EXT BILIRUBIN TOTAL: 4.2
EXT BUN: 49
EXT CALCIUM: 9.4
EXT CHLORIDE: 102
EXT CO2: 20
EXT CREATININE: 3.25 MG/DL
EXT EGFR NO RACE VARIABLE: 24
EXT EOSINOPHIL%: 10.1
EXT GLUCOSE: 92
EXT HEMATOCRIT: 32.5
EXT HEMOGLOBIN: 11.5
EXT LYMPH%: 28.5
EXT MONOCYTES%: 10.5
EXT PLATELETS: 112
EXT POTASSIUM: 3.1
EXT PROTEIN TOTAL: 7.4
EXT SEGS%: 50.4
EXT SODIUM: 135 MMOL/L
EXT TACROLIMUS LVL: 7.5
EXT WBC: 5.9

## 2024-11-13 DIAGNOSIS — Z94.4 S/P LIVER TRANSPLANT: Primary | ICD-10-CM

## 2024-11-13 RX ORDER — MYCOPHENOLIC ACID 360 MG/1
360 TABLET, DELAYED RELEASE ORAL 2 TIMES DAILY
Qty: 60 TABLET | Refills: 11 | Status: SHIPPED | OUTPATIENT
Start: 2024-11-13 | End: 2025-11-13

## 2024-11-13 NOTE — TELEPHONE ENCOUNTER
Labs reviewed via  telephone.  Pt will start Myfortic 360 bid and will decrease Prograf to 0.5 mg daily.  Pt has been instructed to not decrease Prograf until he starts the Myfortic.  Pt repeated back instructions and v/u.  Will repeat labs next week.       ----- Message from Laura Morgan MD sent at 11/13/2024 12:30 PM CST -----  Labs reviewed. No changes at this time.

## 2024-11-13 NOTE — ASSESSMENT & PLAN NOTE
- Continue cyclosporine  - Hold Cellcept for infection  - Will monitor for signs of toxic side effects, check daily Cyclosporine troughs, and change meds accordingly.   Post-Care Instructions: I reviewed with the patient in detail post-care instructions. Patient understands to keep the injection sites clean and call the clinic if there is any redness, swelling or pain. Render J-Code Information In Note?: yes Lot # (Optional): [id number] Medication (1) And Associated J-Code Units: 5-Fluorouracil, 500mg Total Volume Injected In Cc (Will Not Affected Billing): .5 Type Of Vial Used?: Multi-Dose Procedure Information: Please note that the numeric value listed in the Medication (1) and associated J-code units and Medication (2) and associated J-code units variables are j-code amounts and do not represent either the concentration or the total amount of the medications injected.  I strongly recommend selecting no to the Render J-code information in note question. This will allow your note to be more clear. If you are billing j-codes with your injection codes you need to document the total amount of the medication injected. This amount should match the j-code units. For example, if you are injecting Triamcinolone 40mg as an intramuscular injection you would select 40 for the dose field.. This would allow you to document  with 4 units (40mg = 10mg x 4). The total volume is not used to calculate j-codes only the amount of the medication administered. Expiration Date (Optional): 04/2025 Route: [place] Detail Level: Detailed Treatment Number: 1 Bill For Wasted Drug?: no Dose Administered (Numbers Only - Mg, G, Mcg, Units, Cc): 0 Consent: The risks of the medication were reviewed with the patient.

## 2024-11-18 ENCOUNTER — PATIENT MESSAGE (OUTPATIENT)
Dept: TRANSPLANT | Facility: CLINIC | Age: 34
End: 2024-11-18
Payer: MEDICARE

## 2024-11-18 ENCOUNTER — TELEPHONE (OUTPATIENT)
Dept: SURGERY | Facility: CLINIC | Age: 34
End: 2024-11-18

## 2024-11-18 ENCOUNTER — OFFICE VISIT (OUTPATIENT)
Dept: SURGERY | Facility: CLINIC | Age: 34
End: 2024-11-18
Payer: MEDICARE

## 2024-11-18 VITALS
DIASTOLIC BLOOD PRESSURE: 82 MMHG | BODY MASS INDEX: 25.58 KG/M2 | WEIGHT: 159.19 LBS | SYSTOLIC BLOOD PRESSURE: 137 MMHG | HEIGHT: 66 IN | HEART RATE: 76 BPM

## 2024-11-18 DIAGNOSIS — Z01.818 PRE-OP TESTING: Primary | ICD-10-CM

## 2024-11-18 DIAGNOSIS — F11.29 OPIOID DEPENDENCE WITH OPIOID-INDUCED DISORDER: ICD-10-CM

## 2024-11-18 DIAGNOSIS — Z94.4 STATUS POST LIVER TRANSPLANT: ICD-10-CM

## 2024-11-18 DIAGNOSIS — D69.6 THROMBOCYTOPENIA: ICD-10-CM

## 2024-11-18 DIAGNOSIS — K40.20 NON-RECURRENT BILATERAL INGUINAL HERNIA WITHOUT OBSTRUCTION OR GANGRENE: ICD-10-CM

## 2024-11-18 DIAGNOSIS — D84.9 IMMUNOSUPPRESSION: ICD-10-CM

## 2024-11-18 PROBLEM — K42.9 UMBILICAL HERNIA: Status: RESOLVED | Noted: 2019-04-12 | Resolved: 2024-11-18

## 2024-11-18 PROCEDURE — 3075F SYST BP GE 130 - 139MM HG: CPT | Mod: CPTII,S$GLB,, | Performed by: SURGERY

## 2024-11-18 PROCEDURE — 99214 OFFICE O/P EST MOD 30 MIN: CPT | Mod: S$GLB,,, | Performed by: SURGERY

## 2024-11-18 PROCEDURE — 3008F BODY MASS INDEX DOCD: CPT | Mod: CPTII,S$GLB,, | Performed by: SURGERY

## 2024-11-18 PROCEDURE — 3079F DIAST BP 80-89 MM HG: CPT | Mod: CPTII,S$GLB,, | Performed by: SURGERY

## 2024-11-18 PROCEDURE — 1159F MED LIST DOCD IN RCRD: CPT | Mod: CPTII,S$GLB,, | Performed by: SURGERY

## 2024-11-18 PROCEDURE — 99999 PR PBB SHADOW E&M-EST. PATIENT-LVL III: CPT | Mod: PBBFAC,,, | Performed by: SURGERY

## 2024-11-18 NOTE — PROGRESS NOTES
[facility] Surgery Clinic H&P    Subjective:     Interval Hx:  Continues to have discomfort in the left groin with walking, standing and sitting upright on a firm surface. Better when reclined or supine. Feels symptoms a little worse than when last seen in 8/2024.   For the most part left hernia remains prominent and out but reducible at times with lying down.  No discomfort or bulge on the right side.   No change in bowel function; using lactulose for control of hepatic enceph.  No change in urination - at baseline needs to be standing to void but this is not new.  Recent ED and hepatology note reviewed; recovered from recent hospitalization for encephalopathy in 9/2024 of unclear etiology. Back to baseline.  Is now off sirolimus and on cellcept BID and prograf 0.5mg daily (he was unclear about the need to continue prograf and we went over the most recent telephone notes from hepatology that lay out this plan).  Recent labs reviewed from last week - most recent PLT count 112 but prior values have been <100. No recent INR.    Initial Hx  Jhonny Diana is a 34 y.o. male with PMH of chronic pain and cirrhosis s/p liver transplant in 2017 who presents to clinic for evaluation of bilateral inguinal hernias. Patient presented to the ED on 7/27 with acute onset L scrotal pain after waking up. He noticed a large swelling in his scrotum. Imaging done in the ED notable for bilateral inguinal hernias, L larger than R. The patient reports he was able to reduce the hernia and his pain subsided over the next couple of days. Today he reports mild pain in the L scrotum radiating to the abdomen which worsens when he is standing for long periods of time. Hernia remains reducible. Denies nausea, vomiting, changes in bowel function, or skin changes. He denies any symptoms on the right side.    Of note, the patient was recently started on prednisone for transaminitis in the setting of his liver transplant. He continues on  sirolimus for immunosuppression. Takes oxycodone       PMH:   Past Medical History:   Diagnosis Date    Abdominal pain 05/27/2018    Alcoholic cirrhosis 08/14/2018    Alcoholic hepatitis with ascites     Anemia     CKD (chronic kidney disease) stage 3, GFR 30-59 ml/min 08/14/2018    Encounter for blood transfusion     multiple blood transfusions    ESRD on dialysis     Herpes zoster without complication 12/11/2019    History of hematemesis 09/28/2017    Hypertension     Seizures     Substance abuse     alcohol and opioids    Thrombocytopenia     Transplanted liver     10/2017       Past Surgical History:   Past Surgical History:   Procedure Laterality Date    APPENDECTOMY      CENTRAL VENOUS CATHETER TUNNELED INSERTION DOUBLE LUMEN      inserted and removed    ERCP      ERCP N/A 7/2/2018    Procedure: ERCP;  Surgeon: Solis Villanueva MD;  [facility]  Service: Endoscopy;  Laterality: N/A;  dialysis/cirrhosis/labs prior to ERCP/svn  platelet    ESOPHAGOGASTRODUODENOSCOPY      ESOPHAGOGASTRODUODENOSCOPY N/A 11/25/2019    Procedure: EGD (ESOPHAGOGASTRODUODENOSCOPY);  Surgeon: Sheri Siddiqui MD;  [facility]  Service: Endoscopy;  Laterality: N/A;    LIVER TRANSPLANT      10/2017       Social History:  Allergies:   Review of patient's allergies indicates:   Allergen Reactions    Bactrim [sulfamethoxazole-trimethoprim] Other (See Comments)     Mookie Trell Syndrome       Medications:  Current Outpatient Medications on File Prior to Visit   Medication Sig Dispense Refill    diazePAM (VALIUM) 2 MG tablet Take 2 mg by mouth 2 (two) times a day.      lactulose (CHRONULAC) 10 gram/15 mL solution Take 30 mLs (20 g total) by mouth 3 (three) times daily. 630 mL 0    mycophenolate sodium (MYFORTIC) 360 MG TbEC Take 1 tablet (360 mg total) by mouth 2 (two) times daily. 60 tablet 11    naloxegoL (MOVANTIK) 12.5 mg Tab Take 12.5 mg by mouth once daily. 30 tablet 0    naloxone (NARCAN) 4  mg/actuation [place] CALL 911. SPR CONTENTS OF ONE SPRAYER (0.1ML) INTO ONE NOSTRIL. REPEAT IN 2-3 MIN IF SYMPTOMS OF OPIOID EMERGENCY PERSIST, ALTERNATE NOSTRILS      oxyCODONE (ROXICODONE) 15 MG Tab Take 10 mg by mouth 3 (three) times daily.      tacrolimus (PROGRAF) 0.5 MG Cap Take 1 capsule (0.5 mg total) by mouth every 12 (twelve) hours. 60 capsule 11    tiZANidine 4 mg Cap Take 1 tablet by mouth daily as needed.      torsemide (DEMADEX) 10 MG Tab       valACYclovir (VALTREX) 500 MG tablet Take by mouth once daily.       No current facility-administered medications on file prior to visit.       Objective:     Vital Signs (Most Recent)  Pulse: 76 (11/18/24 1057)  BP: 137/82 (11/18/24 1057)    ROS A 10+ review of systems was performed with pertinent positives and negatives noted above in the history of present illness.  Other systems were negative unless otherwise specified.    Physical Exam:  Gen: awake, alert, in no acute distress  HEENT: normocephalic, atraumatic, EOMI, no scleral icterus  CV: regular rate and rhythm  Pulm: equal chest rise bilaterally, normal work of breathing  Abd:  soft, non-tender, no guarding. Well-healed chevron incision, upper midline incision to the umbilicus, and multiple laparoscopic incisions. There is a focal area of mild weakness in the middle of the upper midline, not clearly a hernia.   Prominent L sided inguinoscrotal hernia. No R sided hernia appreciated.   Ext: WWP, skin warm and dry    Imaging  The following imaging was reviewed: CT Abd/Pelvis 9/2024  Hepatology notes  Recent labs  ER notes.      Assessment:     Jhonny Diana is a 34 y.o. male with a history of cirrhosis s/p liver transplant in 2017 who presents with a symptomatic L sided inguinal hernia. He was also noted to have a smaller R sided inguinal hernia on imaging, which is asymptomatic and not clearly clinically evident. L sided hernia is reducible, no signs or symptoms of incarceration or strangulation at  this time. Has been recently seen and cleared by hepatology.  Most recent PLT count >100 but all prior counts <100. No recent INR.    Plan:     Planned for Laparoscopic L inguinal hernia repair w/ mesh, R sided exploration, possible open left repair on 12/5/2024  Consent previously obtained. Rediscussed.  Patient will work with pain team for post-op pain management - he has already discussed this with his providers. Plan will be to continue home oxycodone 15mg TID (chronically on this for nearly 2 years), add limited ibuprofen and tylenol (2gm limit / day). Is also on valium which additionally helps.     -will plan for repeat CBC and INR 48 hours prior to ensure INR<1.5 and PLT >100. If not, will need to arrange for perioperative transfusions.      Braden Rey MD FACS  Minimally Invasive General & Bariatric Surgery  [facility] [place] [place]

## 2024-11-24 ENCOUNTER — PATIENT MESSAGE (OUTPATIENT)
Dept: HEPATOLOGY | Facility: CLINIC | Age: 34
End: 2024-11-24
Payer: MEDICARE

## 2024-11-25 ENCOUNTER — TELEPHONE (OUTPATIENT)
Dept: TRANSPLANT | Facility: CLINIC | Age: 34
End: 2024-11-25
Payer: MEDICARE

## 2024-11-25 ENCOUNTER — HOSPITAL ENCOUNTER (OUTPATIENT)
Facility: HOSPITAL | Age: 34
Discharge: HOME | End: 2024-11-26
Attending: EMERGENCY MEDICINE | Admitting: SURGERY
Payer: MEDICARE

## 2024-11-25 DIAGNOSIS — R10.30 INGUINAL PAIN, UNSPECIFIED LATERALITY: Primary | ICD-10-CM

## 2024-11-25 DIAGNOSIS — K40.21 BILATERAL RECURRENT INGUINAL HERNIA WITHOUT OBSTRUCTION OR GANGRENE: ICD-10-CM

## 2024-11-25 PROBLEM — K40.90 UNILATERAL INGUINAL HERNIA: Status: ACTIVE | Noted: 2024-11-25

## 2024-11-25 LAB
ALBUMIN SERPL BCP-MCNC: 3.4 G/DL (ref 3.5–5.2)
ALLENS TEST: NORMAL
ALP SERPL-CCNC: 175 U/L (ref 40–150)
ALT SERPL W/O P-5'-P-CCNC: 24 U/L (ref 10–44)
ANION GAP SERPL CALC-SCNC: 12 MMOL/L (ref 8–16)
AST SERPL-CCNC: 44 U/L (ref 10–40)
BASOPHILS # BLD AUTO: 0.03 K/UL (ref 0–0.2)
BASOPHILS NFR BLD: 0.5 % (ref 0–1.9)
BILIRUB SERPL-MCNC: 2.6 MG/DL (ref 0.1–1)
BILIRUB UR QL STRIP: NEGATIVE
BUN SERPL-MCNC: 30 MG/DL (ref 6–20)
CALCIUM SERPL-MCNC: 9.5 MG/DL (ref 8.7–10.5)
CHLORIDE SERPL-SCNC: 111 MMOL/L (ref 95–110)
CLARITY UR REFRACT.AUTO: CLEAR
CO2 SERPL-SCNC: 19 MMOL/L (ref 23–29)
COLOR UR AUTO: YELLOW
CREAT SERPL-MCNC: 2.3 MG/DL (ref 0.5–1.4)
DIFFERENTIAL METHOD BLD: ABNORMAL
EOSINOPHIL # BLD AUTO: 0.3 K/UL (ref 0–0.5)
EOSINOPHIL NFR BLD: 4.8 % (ref 0–8)
ERYTHROCYTE [DISTWIDTH] IN BLOOD BY AUTOMATED COUNT: 15.2 % (ref 11.5–14.5)
EST. GFR  (NO RACE VARIABLE): 37 ML/MIN/1.73 M^2
GLUCOSE SERPL-MCNC: 80 MG/DL (ref 70–110)
GLUCOSE UR QL STRIP: NEGATIVE
HCT VFR BLD AUTO: 38.9 % (ref 40–54)
HGB BLD-MCNC: 13.1 G/DL (ref 14–18)
HGB UR QL STRIP: NEGATIVE
IMM GRANULOCYTES # BLD AUTO: 0.01 K/UL (ref 0–0.04)
IMM GRANULOCYTES NFR BLD AUTO: 0.2 % (ref 0–0.5)
INR PPP: 1.1 (ref 0.8–1.2)
KETONES UR QL STRIP: NEGATIVE
LACTATE SERPL-SCNC: 1.1 MMOL/L (ref 0.5–2.2)
LDH SERPL L TO P-CCNC: 1.3 MMOL/L (ref 0.5–2.2)
LEUKOCYTE ESTERASE UR QL STRIP: NEGATIVE
LIPASE SERPL-CCNC: 33 U/L (ref 4–60)
LYMPHOCYTES # BLD AUTO: 1.8 K/UL (ref 1–4.8)
LYMPHOCYTES NFR BLD: 28.9 % (ref 18–48)
MCH RBC QN AUTO: 33.6 PG (ref 27–31)
MCHC RBC AUTO-ENTMCNC: 33.7 G/DL (ref 32–36)
MCV RBC AUTO: 100 FL (ref 82–98)
MONOCYTES # BLD AUTO: 0.4 K/UL (ref 0.3–1)
MONOCYTES NFR BLD: 7.2 % (ref 4–15)
NEUTROPHILS # BLD AUTO: 3.6 K/UL (ref 1.8–7.7)
NEUTROPHILS NFR BLD: 58.4 % (ref 38–73)
NITRITE UR QL STRIP: NEGATIVE
NRBC BLD-RTO: 0 /100 WBC
PH UR STRIP: 5 [PH] (ref 5–8)
PLATELET # BLD AUTO: 96 K/UL (ref 150–450)
PMV BLD AUTO: 10.1 FL (ref 9.2–12.9)
POTASSIUM SERPL-SCNC: 4.4 MMOL/L (ref 3.5–5.1)
PROT SERPL-MCNC: 8.1 G/DL (ref 6–8.4)
PROT UR QL STRIP: NEGATIVE
PROTHROMBIN TIME: 12.2 SEC (ref 9–12.5)
RBC # BLD AUTO: 3.9 M/UL (ref 4.6–6.2)
SAMPLE: NORMAL
SITE: NORMAL
SODIUM SERPL-SCNC: 142 MMOL/L (ref 136–145)
SP GR UR STRIP: 1.01 (ref 1–1.03)
URN SPEC COLLECT METH UR: NORMAL
WBC # BLD AUTO: 6.09 K/UL (ref 3.9–12.7)

## 2024-11-25 PROCEDURE — 99900035 HC TECH TIME PER 15 MIN (STAT)

## 2024-11-25 PROCEDURE — 83690 ASSAY OF LIPASE: CPT | Performed by: EMERGENCY MEDICINE

## 2024-11-25 PROCEDURE — 81003 URINALYSIS AUTO W/O SCOPE: CPT | Performed by: EMERGENCY MEDICINE

## 2024-11-25 PROCEDURE — G0378 HOSPITAL OBSERVATION PER HR: HCPCS

## 2024-11-25 PROCEDURE — 83605 ASSAY OF LACTIC ACID: CPT

## 2024-11-25 PROCEDURE — 96376 TX/PRO/DX INJ SAME DRUG ADON: CPT

## 2024-11-25 PROCEDURE — 36415 COLL VENOUS BLD VENIPUNCTURE: CPT

## 2024-11-25 PROCEDURE — 80053 COMPREHEN METABOLIC PANEL: CPT | Performed by: EMERGENCY MEDICINE

## 2024-11-25 PROCEDURE — 63600175 PHARM REV CODE 636 W HCPCS

## 2024-11-25 PROCEDURE — 99285 EMERGENCY DEPT VISIT HI MDM: CPT | Mod: 25

## 2024-11-25 PROCEDURE — 96374 THER/PROPH/DIAG INJ IV PUSH: CPT

## 2024-11-25 PROCEDURE — 25500020 PHARM REV CODE 255: Performed by: EMERGENCY MEDICINE

## 2024-11-25 PROCEDURE — 85025 COMPLETE CBC W/AUTO DIFF WBC: CPT | Performed by: EMERGENCY MEDICINE

## 2024-11-25 PROCEDURE — 25000003 PHARM REV CODE 250

## 2024-11-25 PROCEDURE — 85610 PROTHROMBIN TIME: CPT

## 2024-11-25 RX ORDER — HYDROMORPHONE HYDROCHLORIDE 1 MG/ML
1 INJECTION, SOLUTION INTRAMUSCULAR; INTRAVENOUS; SUBCUTANEOUS
Status: COMPLETED | OUTPATIENT
Start: 2024-11-25 | End: 2024-11-25

## 2024-11-25 RX ORDER — DEXTROSE MONOHYDRATE, SODIUM CHLORIDE, AND POTASSIUM CHLORIDE 50; 1.49; 4.5 G/1000ML; G/1000ML; G/1000ML
INJECTION, SOLUTION INTRAVENOUS CONTINUOUS
Status: DISCONTINUED | OUTPATIENT
Start: 2024-11-25 | End: 2024-11-26 | Stop reason: HOSPADM

## 2024-11-25 RX ORDER — TACROLIMUS 0.5 MG/1
0.5 CAPSULE ORAL 2 TIMES DAILY
Status: DISCONTINUED | OUTPATIENT
Start: 2024-11-26 | End: 2024-11-26 | Stop reason: HOSPADM

## 2024-11-25 RX ORDER — OXYCODONE HYDROCHLORIDE 10 MG/1
10 TABLET ORAL 3 TIMES DAILY
Status: DISCONTINUED | OUTPATIENT
Start: 2024-11-25 | End: 2024-11-26

## 2024-11-25 RX ORDER — NALOXONE HCL 0.4 MG/ML
0.02 VIAL (ML) INJECTION
Status: DISCONTINUED | OUTPATIENT
Start: 2024-11-25 | End: 2024-11-26 | Stop reason: HOSPADM

## 2024-11-25 RX ORDER — MYCOPHENOLIC ACID 180 MG/1
360 TABLET, DELAYED RELEASE ORAL 2 TIMES DAILY
Status: DISCONTINUED | OUTPATIENT
Start: 2024-11-26 | End: 2024-11-26 | Stop reason: HOSPADM

## 2024-11-25 RX ORDER — HEPARIN SODIUM 5000 [USP'U]/ML
5000 INJECTION, SOLUTION INTRAVENOUS; SUBCUTANEOUS EVERY 8 HOURS
Status: DISCONTINUED | OUTPATIENT
Start: 2024-11-26 | End: 2024-11-26 | Stop reason: HOSPADM

## 2024-11-25 RX ORDER — DIAZEPAM 2 MG/1
2 TABLET ORAL EVERY 12 HOURS PRN
Status: DISCONTINUED | OUTPATIENT
Start: 2024-11-25 | End: 2024-11-26 | Stop reason: HOSPADM

## 2024-11-25 RX ORDER — HYDROMORPHONE HYDROCHLORIDE 1 MG/ML
0.5 INJECTION, SOLUTION INTRAMUSCULAR; INTRAVENOUS; SUBCUTANEOUS
Status: DISCONTINUED | OUTPATIENT
Start: 2024-11-25 | End: 2024-11-26

## 2024-11-25 RX ORDER — VALACYCLOVIR HYDROCHLORIDE 500 MG/1
500 TABLET, FILM COATED ORAL DAILY
Status: DISCONTINUED | OUTPATIENT
Start: 2024-11-26 | End: 2024-11-26 | Stop reason: HOSPADM

## 2024-11-25 RX ORDER — SODIUM CHLORIDE 0.9 % (FLUSH) 0.9 %
10 SYRINGE (ML) INJECTION
Status: DISCONTINUED | OUTPATIENT
Start: 2024-11-25 | End: 2024-11-26 | Stop reason: HOSPADM

## 2024-11-25 RX ORDER — LACTULOSE 10 G/15ML
20 SOLUTION ORAL 3 TIMES DAILY
Status: DISCONTINUED | OUTPATIENT
Start: 2024-11-25 | End: 2024-11-26 | Stop reason: HOSPADM

## 2024-11-25 RX ADMIN — HYDROMORPHONE HYDROCHLORIDE 1 MG: 1 INJECTION, SOLUTION INTRAMUSCULAR; INTRAVENOUS; SUBCUTANEOUS at 06:11

## 2024-11-25 RX ADMIN — OXYCODONE 10 MG: 5 TABLET ORAL at 09:11

## 2024-11-25 RX ADMIN — DIAZEPAM 2 MG: 2 TABLET ORAL at 09:11

## 2024-11-25 RX ADMIN — DEXTROSE, SODIUM CHLORIDE, AND POTASSIUM CHLORIDE: 5; .45; .15 INJECTION INTRAVENOUS at 10:11

## 2024-11-25 RX ADMIN — HYDROMORPHONE HYDROCHLORIDE 1 MG: 1 INJECTION, SOLUTION INTRAMUSCULAR; INTRAVENOUS; SUBCUTANEOUS at 07:11

## 2024-11-25 RX ADMIN — IOHEXOL 75 ML: 350 INJECTION, SOLUTION INTRAVENOUS at 09:11

## 2024-11-25 NOTE — FIRST PROVIDER EVALUATION
"Medical screening examination initiated.  I have conducted a focused provider triage encounter, findings are as follows:    Brief history of present illness:  Pt with pain in region of inguinal hernias.  Hernia not reducing.      Vitals:    11/25/24 1512   BP: 122/66   BP Location: Right arm   Pulse: (!) 57   Resp: 16   Temp: 98.9 °F (37.2 °C)   TempSrc: Oral   SpO2: 99%   Weight: 72.2 kg (159 lb 2.8 oz)   Height: 5' 6" (1.676 m)       Pertinent physical exam:   exam deferred in triage, no abdominal pain on exam.    Brief workup plan:  Will start with labs, imaging per primary ED team after full exam.    Preliminary workup initiated; this workup will be continued and followed by the physician or advanced practice provider that is assigned to the patient when roomed.  "

## 2024-11-25 NOTE — ED PROVIDER NOTES
Encounter Date: 11/25/2024       History     Chief Complaint   Patient presents with    Abdominal Pain     Pt has 2 inguinal hernias, discomfort since last night. Liver transplant in 2017.     34-year-old with past medical history alcoholic cirrhosis s/p liver transplant (2017), CKD, and chronic pain who presents to the emergency department with complaints of worsening left scrotal pain. Patient reports a history of bilateral inguinal hernia with recent worsening of left inguinal hernia.  Patient reports that he is typically able to reduce inguinal hernia however unsuccessful with reduction maneuvers within the past 24 hours.  Patient reports he began to experience worsening left scrotal pain yesterday evening, at which time he attempted to reduce left inguinal hernia without success.  He describes 10/10 pain localized in the left inguinal region, that is exacerbated with palpation.  He reached out to his liver transplant coordinator this morning due to persistent left scrotal pain and was recommended to go to the nearest emergency department.  He denies fever, chills, nausea, vomiting, constipation, and diarrhea.    The history is provided by the patient. No  was used.     Review of patient's allergies indicates:   Allergen Reactions    Bactrim [sulfamethoxazole-trimethoprim] Other (See Comments)     Mookie Trell Syndrome     Past Medical History:   Diagnosis Date    Abdominal pain 05/27/2018    Alcoholic cirrhosis 08/14/2018    Alcoholic hepatitis with ascites     Anemia     CKD (chronic kidney disease) stage 3, GFR 30-59 ml/min 08/14/2018    Encounter for blood transfusion     multiple blood transfusions    ESRD on dialysis     Herpes zoster without complication 12/11/2019    History of hematemesis 09/28/2017    Hypertension     Seizures     Substance abuse     alcohol and opioids    Thrombocytopenia     Transplanted liver     10/2017     Past Surgical History:   Procedure Laterality Date     APPENDECTOMY      CENTRAL VENOUS CATHETER TUNNELED INSERTION DOUBLE LUMEN      inserted and removed    ERCP      ERCP N/A 2018    Procedure: ERCP;  Surgeon: Solis Villanueva MD;  [facility]  Service: Endoscopy;  Laterality: N/A;  dialysis/cirrhosis/labs prior to ERCP/svn  platelet    ESOPHAGOGASTRODUODENOSCOPY      ESOPHAGOGASTRODUODENOSCOPY N/A 2019    Procedure: EGD (ESOPHAGOGASTRODUODENOSCOPY);  Surgeon: Sheri Siddiqui MD;  [facility]  Service: Endoscopy;  Laterality: N/A;    LIVER TRANSPLANT      10/2017     Family History   Problem Relation Name Age of Onset    No Known Problems Mother      Cancer Father          Lung cancer    Alcohol abuse Father      No Known Problems Sister 3     No Known Problems Brother 1     Kidney disease Neg Hx       Social History     Tobacco Use    Smoking status: Former     Current packs/day: 0.00     Average packs/day: 1 pack/day for 10.0 years (10.0 ttl pk-yrs)     Types: Cigarettes     Start date: 2007     Quit date: 2017     Years since quittin.7     Passive exposure: Current    Smokeless tobacco: Never    Tobacco comments:     Medical Marijuana smoker.    Substance Use Topics    Alcohol use: No     Comment: a fifth of liquor daily for years, cut back over last 2 months    Drug use: No     Review of Systems    Physical Exam     Initial Vitals [24 1512]   BP Pulse Resp Temp SpO2   122/66 (!) 57 16 98.9 °F (37.2 °C) 99 %      MAP       --         Physical Exam    Constitutional: He appears well-developed and well-nourished. He appears distressed.   HENT:   Head: Normocephalic and atraumatic.   Eyes: Conjunctivae and EOM are normal.   Pulmonary/Chest: No respiratory distress.   Abdominal: Abdomen is soft. He exhibits no distension. There is no abdominal tenderness. A hernia is present. Hernia confirmed positive in the left inguinal area.   Genitourinary: Left testis shows swelling and tenderness.     Psychiatric:    Anxious and irritated         ED Course   Procedures  Labs Reviewed   CBC W/ AUTO DIFFERENTIAL - Abnormal       Result Value    WBC 6.09      RBC 3.90 (*)     Hemoglobin 13.1 (*)     Hematocrit 38.9 (*)      (*)     MCH 33.6 (*)     MCHC 33.7      RDW 15.2 (*)     Platelets 96 (*)     MPV 10.1      Immature Granulocytes 0.2      Gran # (ANC) 3.6      Immature Grans (Abs) 0.01      Lymph # 1.8      Mono # 0.4      Eos # 0.3      Baso # 0.03      nRBC 0      Gran % 58.4      Lymph % 28.9      Mono % 7.2      Eosinophil % 4.8      Basophil % 0.5      Differential Method Automated     COMPREHENSIVE METABOLIC PANEL - Abnormal    Sodium 142      Potassium 4.4      Chloride 111 (*)     CO2 19 (*)     Glucose 80      BUN 30 (*)     Creatinine 2.3 (*)     Calcium 9.5      Total Protein 8.1      Albumin 3.4 (*)     Total Bilirubin 2.6 (*)     Alkaline Phosphatase 175 (*)     AST 44 (*)     ALT 24      eGFR 37.0 (*)     Anion Gap 12     URINALYSIS, REFLEX TO URINE CULTURE    Specimen UA Urine, Clean Catch      Color, UA Yellow      Appearance, UA Clear      pH, UA 5.0      Specific Gravity, UA 1.010      Protein, UA Negative      Glucose, UA Negative      Ketones, UA Negative      Bilirubin (UA) Negative      Occult Blood UA Negative      Nitrite, UA Negative      Leukocytes, UA Negative      Narrative:     Specimen Source->Urine   LIPASE    Lipase 33            Imaging Results    None          Medications   HYDROmorphone injection 1 mg (1 mg Intravenous Given 11/25/24 1826)   HYDROmorphone injection 1 mg (1 mg Intravenous Given 11/25/24 1940)     Medical Decision Making  34-year-old with past medical history alcoholic cirrhosis s/p liver transplant (2017), CKD, and chronic pain who presents to the emergency department with complaints of worsening left scrotal pain.  Differential diagnosis including but not limited to strangulated versus incarcerated inguinal hernia, muscular strain, and subcutaneous mass.    Patient  denying constipation reports last bowel movement was within the past 48 hours and reports flatulence within the past few hours prior to presenting to the emergency depart decreasing suspicion for strangulated hernia. Patient was given Dilaudid prior to attempting to reduce inguinal hernia without success.  General surgery was consulted and agreeable for evaluation.  Patient was pending General surgery recommendation and possible CT abdomen and and pelvis at sign-out.    Amount and/or Complexity of Data Reviewed  External Data Reviewed: notes.     Details: Reviewed 11/18 surgery no denoting patient to be scheduled for inguinal hernia repair on 12/5  Labs:  Decision-making details documented in ED Course.     Details: UA unremarkable for UTI or hematuria  CBC unremarkable for a leukocytosis  Radiology: ordered.    Risk  Prescription drug management.               ED Course as of 11/25/24 2028 Mon Nov 25, 2024 1819 Comprehensive metabolic panel(!)  [facility]URA [AH]   2014 Patient seen by General surgery, partially reduced.  Recommending proceeding with CT.   [GM]      ED Course User Index  [AH] Khadijah Ferguson MD  [GM] Maria Esther To MD                           Clinical Impression:  Final diagnoses:  [R10.30] Inguinal pain, unspecified laterality (Primary)  [K40.21] Bilateral recurrent inguinal hernia without obstruction or gangrene                 Khadijah Ferguson MD  Resident  11/25/24 2028

## 2024-11-25 NOTE — TELEPHONE ENCOUNTER
Pt called regarding not being able to reduce his Lt inguinal hernia.  Pt states the hernia is not painful.  Case reviewed with Dr. Morgan who has recommended pt report to nearest ED ASAP for this is a surgical emergency.  Pt verbalized understanding and states he prefers to come to [facility] ED.  States he should arrive in approximately 2 hours.  [facility], COLLEEN Leach has been notified.  Attempted to contact [facility] ED triage nurse also notified.

## 2024-11-26 VITALS
HEIGHT: 66 IN | RESPIRATION RATE: 16 BRPM | WEIGHT: 166.69 LBS | SYSTOLIC BLOOD PRESSURE: 130 MMHG | BODY MASS INDEX: 26.79 KG/M2 | OXYGEN SATURATION: 95 % | DIASTOLIC BLOOD PRESSURE: 72 MMHG | HEART RATE: 92 BPM | TEMPERATURE: 98 F

## 2024-11-26 LAB
ABO + RH BLD: ABNORMAL
ALBUMIN SERPL BCP-MCNC: 3 G/DL (ref 3.5–5.2)
ALP SERPL-CCNC: 160 U/L (ref 40–150)
ALT SERPL W/O P-5'-P-CCNC: 24 U/L (ref 10–44)
ANION GAP SERPL CALC-SCNC: 10 MMOL/L (ref 8–16)
AST SERPL-CCNC: 40 U/L (ref 10–40)
BASOPHILS # BLD AUTO: 0.04 K/UL (ref 0–0.2)
BASOPHILS NFR BLD: 0.7 % (ref 0–1.9)
BILIRUB SERPL-MCNC: 2.3 MG/DL (ref 0.1–1)
BLD GP AB SCN CELLS X3 SERPL QL: ABNORMAL
BLOOD GROUP ANTIBODIES SERPL: NORMAL
BUN SERPL-MCNC: 31 MG/DL (ref 6–20)
CALCIUM SERPL-MCNC: 8.9 MG/DL (ref 8.7–10.5)
CHLORIDE SERPL-SCNC: 111 MMOL/L (ref 95–110)
CO2 SERPL-SCNC: 18 MMOL/L (ref 23–29)
CREAT SERPL-MCNC: 2.5 MG/DL (ref 0.5–1.4)
DIFFERENTIAL METHOD BLD: ABNORMAL
EOSINOPHIL # BLD AUTO: 0.2 K/UL (ref 0–0.5)
EOSINOPHIL NFR BLD: 3.7 % (ref 0–8)
ERYTHROCYTE [DISTWIDTH] IN BLOOD BY AUTOMATED COUNT: 15 % (ref 11.5–14.5)
EST. GFR  (NO RACE VARIABLE): 33.5 ML/MIN/1.73 M^2
GLUCOSE SERPL-MCNC: 113 MG/DL (ref 70–110)
HCT VFR BLD AUTO: 34.5 % (ref 40–54)
HGB BLD-MCNC: 11.9 G/DL (ref 14–18)
IMM GRANULOCYTES # BLD AUTO: 0.01 K/UL (ref 0–0.04)
IMM GRANULOCYTES NFR BLD AUTO: 0.2 % (ref 0–0.5)
LYMPHOCYTES # BLD AUTO: 1.2 K/UL (ref 1–4.8)
LYMPHOCYTES NFR BLD: 22.5 % (ref 18–48)
MAGNESIUM SERPL-MCNC: 1.4 MG/DL (ref 1.6–2.6)
MCH RBC QN AUTO: 33.5 PG (ref 27–31)
MCHC RBC AUTO-ENTMCNC: 34.5 G/DL (ref 32–36)
MCV RBC AUTO: 97 FL (ref 82–98)
MONOCYTES # BLD AUTO: 0.5 K/UL (ref 0.3–1)
MONOCYTES NFR BLD: 9.8 % (ref 4–15)
NEUTROPHILS # BLD AUTO: 3.4 K/UL (ref 1.8–7.7)
NEUTROPHILS NFR BLD: 63.1 % (ref 38–73)
NRBC BLD-RTO: 0 /100 WBC
PHOSPHATE SERPL-MCNC: 3.4 MG/DL (ref 2.7–4.5)
PLATELET # BLD AUTO: 86 K/UL (ref 150–450)
PMV BLD AUTO: 10.1 FL (ref 9.2–12.9)
POTASSIUM SERPL-SCNC: 4.4 MMOL/L (ref 3.5–5.1)
PROT SERPL-MCNC: 7.1 G/DL (ref 6–8.4)
RBC # BLD AUTO: 3.55 M/UL (ref 4.6–6.2)
SODIUM SERPL-SCNC: 139 MMOL/L (ref 136–145)
SPECIMEN OUTDATE: ABNORMAL
WBC # BLD AUTO: 5.42 K/UL (ref 3.9–12.7)

## 2024-11-26 PROCEDURE — G0378 HOSPITAL OBSERVATION PER HR: HCPCS

## 2024-11-26 PROCEDURE — 84100 ASSAY OF PHOSPHORUS: CPT

## 2024-11-26 PROCEDURE — 80053 COMPREHEN METABOLIC PANEL: CPT

## 2024-11-26 PROCEDURE — 83735 ASSAY OF MAGNESIUM: CPT

## 2024-11-26 PROCEDURE — 85025 COMPLETE CBC W/AUTO DIFF WBC: CPT

## 2024-11-26 PROCEDURE — 25000003 PHARM REV CODE 250

## 2024-11-26 PROCEDURE — 63600175 PHARM REV CODE 636 W HCPCS

## 2024-11-26 PROCEDURE — 96376 TX/PRO/DX INJ SAME DRUG ADON: CPT

## 2024-11-26 PROCEDURE — 86901 BLOOD TYPING SEROLOGIC RH(D): CPT

## 2024-11-26 PROCEDURE — 86905 BLOOD TYPING RBC ANTIGENS: CPT

## 2024-11-26 PROCEDURE — 86870 RBC ANTIBODY IDENTIFICATION: CPT

## 2024-11-26 PROCEDURE — 36415 COLL VENOUS BLD VENIPUNCTURE: CPT

## 2024-11-26 RX ORDER — HYDROMORPHONE HYDROCHLORIDE 1 MG/ML
1 INJECTION, SOLUTION INTRAMUSCULAR; INTRAVENOUS; SUBCUTANEOUS
Status: DISCONTINUED | OUTPATIENT
Start: 2024-11-26 | End: 2024-11-26 | Stop reason: HOSPADM

## 2024-11-26 RX ADMIN — OXYCODONE HYDROCHLORIDE 15 MG: 10 TABLET ORAL at 06:11

## 2024-11-26 RX ADMIN — HYDROMORPHONE HYDROCHLORIDE 0.5 MG: 1 INJECTION, SOLUTION INTRAMUSCULAR; INTRAVENOUS; SUBCUTANEOUS at 06:11

## 2024-11-26 RX ADMIN — DIAZEPAM 2 MG: 2 TABLET ORAL at 07:11

## 2024-11-26 RX ADMIN — HYDROMORPHONE HYDROCHLORIDE 0.5 MG: 1 INJECTION, SOLUTION INTRAMUSCULAR; INTRAVENOUS; SUBCUTANEOUS at 02:11

## 2024-11-26 NOTE — HPI
Jhonny Diana is a 34 y.o. male with PMH of chronic pain and cirrhosis s/p liver transplant in 2017 who presents to the emergency department due to concerns of a left inguinal hernia but is now would reducible.  Patient was evaluated in clinic by Dr. Rey 11/18/2020 with plans to attempt a laparoscopic repair of the left inguinal hernia possible right inguinal hernia on 12/05/2024.  Of note, the patient was admitted in September 2024 with encephalopathy of unknown etiology.  The patient is back to his baseline.  The patient states that over the last day or two he has noticed an increased bulge in the left groin.  He says that this has been somewhat difficult to reduce in the past though is now not reducing.  He also complains of pain in this area, and has had similar pain before it is improved with hernia reduction.  The patient is immunosuppressed on CellCept and Prograf, most recent platelets today in the ED were 96, MELD is 18, he has no leukocytosis.  He does not complain of obstructive symptoms is having normal bowel function including 4 bowel movements yesterday and 1 today.  He continues to pass flatus.  He is not on corticosteroids or anticoagulation/antiplatelet therapy.

## 2024-11-26 NOTE — H&P
Please see consult note dated same date for full H&P.     Jevon Watson PA-C  General Surgery   [facility]
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
## 2024-11-26 NOTE — SUBJECTIVE & OBJECTIVE
No current facility-administered medications on file prior to encounter.     Current Outpatient Medications on File Prior to Encounter   Medication Sig    diazePAM (VALIUM) 2 MG tablet Take 2 mg by mouth 2 (two) times a day.    lactulose (CHRONULAC) 10 gram/15 mL solution Take 30 mLs (20 g total) by mouth 3 (three) times daily.    mycophenolate sodium (MYFORTIC) 360 MG TbEC Take 1 tablet (360 mg total) by mouth 2 (two) times daily.    naloxegoL (MOVANTIK) 12.5 mg Tab Take 12.5 mg by mouth once daily.    naloxone (NARCAN) 4 mg/actuation Spry CALL 911. SPR CONTENTS OF ONE SPRAYER (0.1ML) INTO ONE NOSTRIL. REPEAT IN 2-3 MIN IF SYMPTOMS OF OPIOID EMERGENCY PERSIST, ALTERNATE NOSTRILS    oxyCODONE (ROXICODONE) 15 MG Tab Take 10 mg by mouth 3 (three) times daily.    tacrolimus (PROGRAF) 0.5 MG Cap Take 1 capsule (0.5 mg total) by mouth every 12 (twelve) hours.    tiZANidine 4 mg Cap Take 1 tablet by mouth daily as needed.    torsemide (DEMADEX) 10 MG Tab     valACYclovir (VALTREX) 500 MG tablet Take by mouth once daily.       Review of patient's allergies indicates:   Allergen Reactions    Bactrim [sulfamethoxazole-trimethoprim] Other (See Comments)     Mookie Trell Syndrome       Past Medical History:   Diagnosis Date    Abdominal pain 05/27/2018    Alcoholic cirrhosis 08/14/2018    Alcoholic hepatitis with ascites     Anemia     CKD (chronic kidney disease) stage 3, GFR 30-59 ml/min 08/14/2018    Encounter for blood transfusion     multiple blood transfusions    ESRD on dialysis     Herpes zoster without complication 12/11/2019    History of hematemesis 09/28/2017    Hypertension     Seizures     Substance abuse     alcohol and opioids    Thrombocytopenia     Transplanted liver     10/2017     Past Surgical History:   Procedure Laterality Date    APPENDECTOMY      CENTRAL VENOUS CATHETER TUNNELED INSERTION DOUBLE LUMEN      inserted and removed    ERCP      ERCP N/A 7/2/2018    Procedure: ERCP;  Surgeon: Solis HEARD  MD Rich;  Location: [facility] ([address] [place] [address]);  Service: Endoscopy;  Laterality: N/A;  dialysis/cirrhosis/labs prior to ERCP/svn  platelet    ESOPHAGOGASTRODUODENOSCOPY      ESOPHAGOGASTRODUODENOSCOPY N/A 2019    Procedure: EGD (ESOPHAGOGASTRODUODENOSCOPY);  Surgeon: Sheri Siddiqui MD;  [facility]  Service: Endoscopy;  Laterality: N/A;    LIVER TRANSPLANT      10/2017     Family History       Problem Relation (Age of Onset)    Alcohol abuse Father    Cancer Father    No Known Problems Mother, Sister, Brother          Tobacco Use    Smoking status: Former     Current packs/day: 0.00     Average packs/day: 1 pack/day for 10.0 years (10.0 ttl pk-yrs)     Types: Cigarettes     Start date: 2007     Quit date: 2017     Years since quittin.7     Passive exposure: Current    Smokeless tobacco: Never    Tobacco comments:     Medical Marijuana smoker.    Substance and Sexual Activity    Alcohol use: No     Comment: a fifth of liquor daily for years, cut back over last 2 months    Drug use: No    Sexual activity: Not Currently     Partners: Male     Comment: single     Review of Systems   Constitutional:  Negative for activity change, appetite change, chills, fatigue and fever.   Respiratory:  Negative for cough, chest tightness and shortness of breath.    Cardiovascular:  Negative for chest pain and palpitations.   Gastrointestinal:  Positive for abdominal pain. Negative for abdominal distention, anal bleeding, blood in stool, constipation, diarrhea, nausea, rectal pain and vomiting.   Genitourinary:  Positive for difficulty urinating (chronic). Negative for dysuria and flank pain.   Musculoskeletal:  Negative for arthralgias.   Neurological:  Negative for dizziness and light-headedness.     Objective:     Vital Signs (Most Recent):  Temp: 97.8 °F (36.6 °C) (24)  Pulse: 65 (24)  Resp: 16 (24)  BP: (!) 148/86 (24)  SpO2: 100 % (24)  Vital Signs (24h Range):  Temp:  [97.8 °F (36.6 °C)-98.9 °F (37.2 °C)] 97.8 °F (36.6 °C)  Pulse:  [57-65] 65  Resp:  [16-18] 16  SpO2:  [99 %-100 %] 100 %  BP: (122-148)/(66-86) 148/86     Weight: 72.2 kg (159 lb 2.8 oz)  Body mass index is 25.69 kg/m².     Physical Exam  Constitutional:       General: He is not in acute distress.     Appearance: Normal appearance. He is not ill-appearing.   HENT:      Head: Normocephalic and atraumatic.   Eyes:      Extraocular Movements: Extraocular movements intact.      Pupils: Pupils are equal, round, and reactive to light.   Pulmonary:      Effort: Pulmonary effort is normal. No respiratory distress.   Abdominal:      General: Abdomen is flat. There is no distension.      Palpations: Abdomen is soft.      Tenderness: There is no abdominal tenderness. There is no guarding.      Comments: Well-healed transplant incision.  There is moderate size left inguinal hernia that was partially reduced though there remains some firmness in the scrotum.  No overlying skin changes.  There is tenderness  The abdomen is soft, nondistended, nontender   Musculoskeletal:         General: No swelling. Normal range of motion.   Skin:     General: Skin is warm and dry.   Neurological:      General: No focal deficit present.      Mental Status: He is alert.            I have reviewed all pertinent lab results within the past 24 hours.  CBC:   Recent Labs   Lab 11/25/24  1710   WBC 6.09   RBC 3.90*   HGB 13.1*   HCT 38.9*   PLT 96*   *   MCH 33.6*   MCHC 33.7     CMP:   Recent Labs   Lab 11/25/24  1710   GLU 80   CALCIUM 9.5   ALBUMIN 3.4*   PROT 8.1      K 4.4   CO2 19*   *   BUN 30*   CREATININE 2.3*   ALKPHOS 175*   ALT 24   AST 44*   BILITOT 2.6*       Significant Diagnostics:  I have reviewed all pertinent imaging results/findings within the past 24 hours.
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
## 2024-11-26 NOTE — NURSING
Received patient on the floor, coming from   ED, ambulatory, vital signs stable, mother at the bedside.

## 2024-11-26 NOTE — NURSING
Paged general surgery on call for patient complaining of pain regimen not working at the moment, stated doctor told him he was going to be with the same regimen from home, awaiting for response.

## 2024-11-26 NOTE — PLAN OF CARE
[facility]  Discharge Final Note    Primary Care Provider: Elo Bull MD    Expected Discharge Date: 11/26/2024    Final Discharge Note (most recent)       Final Note - 11/26/24 0832          Final Note    Assessment Type Final Discharge Note     Anticipated Discharge Disposition Home or Self Care     What phone number can be called within the next 1-3 days to see how you are doing after discharge? --   [phone]                    Important Message from Medicare           Patient discharged home to care of family on 11/26/24.    Christina Rosario RNCM  Case Management  [facility]  [phone]

## 2024-11-26 NOTE — PROVIDER PROGRESS NOTES - EMERGENCY DEPT.
Encounter Date: 11/25/2024    ED Physician Progress Notes          ED Physician Hand-off Note:    ED Course: I assumed care of patient from off-going ED physician, Dr Khadijah Ferguson.  Briefly, Patient is a 34-year-old male history of hernia presents with lower abdominal pain and in reducible hernia today..    At the time of signout plan was pending pain control, surgery recommendations and CT abdomen and pelvis with IV contrast..    Disposition:   admission    During my time caring for the patient, patient became belligerent staff, we explained that the patient will need to stop yelling at staff and sore in the hallways.  Patient was verbally redirectable.      Patient comfortable with current treatment regimen. Patient counseled regarding exam, results, diagnosis, treatment, and plan.    Impression:  Stable    Final diagnoses:  [R10.30] Inguinal pain, unspecified laterality (Primary)  [K40.21] Bilateral recurrent inguinal hernia without obstruction or gangrene

## 2024-11-26 NOTE — PLAN OF CARE
[facility]  Discharge Assessment    Primary Care Provider: Elo Bull MD     Discharge Assessment (most recent)       BRIEF DISCHARGE ASSESSMENT - 11/26/24 0810          Discharge Planning    Assessment Type Discharge Planning Brief Assessment     Resource/Environmental Concerns none     Support Systems Parent     Equipment Currently Used at Home cane, straight     Discharge Plan A Home                       Spoke with patient and his mother at bedside to complete d/c planning assessment. Patient lives with his mother in a single story home with 4 steps to enter. Independent with ADL's. Home DME includes a cane. Verified PCP, Pharmacy and health insurance. Patient leaving this morning as he is expressing dissatisfaction with his current surgical plan. Team aware and d/c orders placed.  Discharge Plan A and Plan B have been determined by review of patient's clinical status, future medical and therapeutic needs, and coverage/benefits for post-acute care in coordination with multidisciplinary team members.      Christina ROSENTHAL  Case Management  [facility]  [phone]

## 2024-11-26 NOTE — ASSESSMENT & PLAN NOTE
The patient is a 34-year-old male with the above-listed medical history including liver transplant in 2017, and ongoing cirrhosis, with a MELD of 18.  Patient presents due to incarcerated inguinal hernia.  This hernia was partially reduced at the bedside though the patient would not allow further attempts at reduction.  He is not appear to be obstructed at this time, does not have a leukocytosis, and I have ordered a lactic acid.  I have also recommended obtaining CT scan.     -Admit to observation with general surgery   -We will follow up results of CT scan, likely plan for surgery tomorrow morning.  The if there is significant bowel involvement signs of obstruction, or significant edema or signs of inflammation may proceed with surgery tonight  -patient takes 15 mg of oxycodone scheduled 3 times daily at home, we will resume this  -okay for home dose of Valium  -NPO  -DVT prophylaxis  -gentle fluid resuscitation, in context of chronic kidney disease

## 2024-11-26 NOTE — ASSESSMENT & PLAN NOTE
The patient is a 34-year-old male with the above-listed medical history including liver transplant in 2017, and ongoing cirrhosis, with a MELD of 18.  Patient presents due to incarcerated inguinal hernia.  This hernia was partially reduced at the bedside though the patient would not allow further attempts at reduction.  He is not appear to be obstructed at this time, does not have a leukocytosis, and I have ordered a lactic acid.  I have also recommended obtaining CT scan.     -Admit to observation with general surgery   -We will follow up results of CT scan, likely plan for surgery tomorrow morning.  The if there is significant bowel involvement signs of obstruction, or significant edema or signs of inflammation may proceed with surgery tonight  -patient takes 15 mg of oxycodone scheduled 3 times daily at home, we will resume this  -okay for home dose of Valium  -NPO  -DVT prophylaxis  -gentle fluid resuscitation, in context of chronic kidney disease  -we will touch place with transplant hepatology regarding perioperative immunosuppression  -daily labs

## 2024-11-27 ENCOUNTER — PATIENT MESSAGE (OUTPATIENT)
Dept: SURGERY | Facility: CLINIC | Age: 34
End: 2024-11-27
Payer: MEDICARE

## 2024-12-02 ENCOUNTER — PATIENT MESSAGE (OUTPATIENT)
Dept: SURGERY | Facility: CLINIC | Age: 34
End: 2024-12-02
Payer: MEDICARE

## 2024-12-02 ENCOUNTER — TELEPHONE (OUTPATIENT)
Dept: SURGERY | Facility: CLINIC | Age: 34
End: 2024-12-02
Payer: MEDICARE

## 2024-12-02 NOTE — TELEPHONE ENCOUNTER
"Spoke with patient  Advised that in terms of his surgery currently scheduled for 12/5/24, that Dr. Rey said he could do it laparoscopically but only if the hernia is reducible, and that the hydrocele would have to be addressed at the same time, which he stated he already knew and that he didn't want to have to go under anesthesia multiple time.    Advised if hernia not reducible it would have to open, as stated on the consent that was signed.  He said his preference would be laparoscopically, but that he understood it may have to be open and he is ok with that so long as "all steps necessary are made to make a safe, lasting repair."  He expressed strong concerns about the sensitive area that surgery was being performed in and stated that he was especially concerned because he was a rose male.  I advised that most men, regardless of sexual preference felt that way.  I also advised that a urological surgeon would have to be a co-surgeon on this surgery to address the hydrocele and that it may be too short a notice to find someone for his 12/5 surgery date, but that I would let him know before the end of the day.  Pt verbalized understanding to all and has no further questions at this time.     "

## 2024-12-02 NOTE — TELEPHONE ENCOUNTER
Attempted to contact patient to advise that we would have to postpone sx to January 2025 - due to no urological surgeon availability.  Will also send portal message.

## 2024-12-03 ENCOUNTER — PATIENT MESSAGE (OUTPATIENT)
Dept: SURGERY | Facility: CLINIC | Age: 34
End: 2024-12-03
Payer: MEDICARE

## 2024-12-03 ENCOUNTER — PATIENT MESSAGE (OUTPATIENT)
Dept: TRANSPLANT | Facility: CLINIC | Age: 34
End: 2024-12-03
Payer: MEDICARE

## 2024-12-05 ENCOUNTER — PATIENT MESSAGE (OUTPATIENT)
Dept: TRANSPLANT | Facility: CLINIC | Age: 34
End: 2024-12-05
Payer: MEDICARE

## 2024-12-09 ENCOUNTER — PATIENT MESSAGE (OUTPATIENT)
Dept: TRANSPLANT | Facility: CLINIC | Age: 34
End: 2024-12-09
Payer: MEDICARE

## 2024-12-09 ENCOUNTER — TELEPHONE (OUTPATIENT)
Dept: TRANSPLANT | Facility: CLINIC | Age: 34
End: 2024-12-09
Payer: MEDICARE

## 2024-12-09 NOTE — TELEPHONE ENCOUNTER
Torsten message sent to notify pt to contact HIM/medical records to obtain records for PCP.  ----- Message from Jhon Collier sent at 12/9/2024  4:17 PM CST -----  Regarding: FW: Patient advice  Contact: Pt [phone]    ----- Message -----  From: Jadyn Cheng  Sent: 12/9/2024   4:09 PM CST  To: [facility] Post-Liver Transplant Non-Clinical  Subject: Patient advice                                             Name of Caller: Jhonny      Contact Preference: [phone]    Nature of Call: Requesting all records, imaging, and labs from the last 65 days sent to Dr. Jose FALLON if needed provider can be reached at Phone: [phone]

## 2024-12-09 NOTE — TELEPHONE ENCOUNTER
----- Message from Med  April sent at 12/9/2024  4:17 PM CST -----  Regarding: FW: Patient advice  Contact: Pt [phone]    ----- Message -----  From: Jadyn Cheng  Sent: 12/9/2024   4:09 PM CST  To: [facility] Post-Liver Transplant Non-Clinical  Subject: Patient advice                                             Name of Caller: Jhonny      Contact Preference: [phone]    Nature of Call: Requesting all records, imaging, and labs from the last 65 days sent to Dr. Jose FALLON if needed provider can be reached at Phone: [phone]

## 2024-12-09 NOTE — TELEPHONE ENCOUNTER
----- Message from Med  April sent at 12/9/2024  4:17 PM CST -----  Regarding: FW: Patient advice  Contact: Pt [phone]    ----- Message -----  From: Jadyn Cheng  Sent: 12/9/2024   4:09 PM CST  To: [facility] Post-Liver Transplant Non-Clinical  Subject: Patient advice                                             Name of Caller: Jhonny      Contact Preference: [phone]    Nature of Call: Requesting all records, imaging, and labs from the last 65 days sent to Dr. Jose FALLON if needed provider can be reached at Phone: [phone]

## 2024-12-21 NOTE — PROGRESS NOTES
Central line removed. Tip intact. Pt instructed to lie flat post 30 minutes after line removal. Instructed pt to leave dressing in place for 24hrs. Will continue to monitor.    Patient arrives for ongoing back from fall at work on 11/3. Patient wishing to get further imaging from prior visit. Patient rating the pain a 4/10 and states nothing is helping to relieve her pain at this time.

## 2025-01-04 NOTE — ASSESSMENT & PLAN NOTE
January 4, 2025    To Whom It May Concern:         This is confirmation that Oseas Day attended her scheduled appointment with Trey Grady P.A.-C. on 1/04/25.  Patient is excused from work on 1/6/2025 through 1/10/2025.  May return to work early if feeling better and fever free for 24 hours.         If you have any questions please do not hesitate to call me at the phone number listed below.    Sincerely,          Trey Grady P.A.-C.  [phone]                   · Appreciate Nephro's help  · Suspect LAURA multifactorial: ischemic ATN given hypotension and injury due to hyperbilirubinemia. Felt less likely to be HRS however have continued albumin with lasix  · Overall, LAURA improving and making adequate urine.  Not measuring amounts as he pulls off condom catheter and is in diapers - but mother reports frequent wet diapers    10/12- renal function worsening, will re-consult nephrology; adding octreotide; strict I/O; decrease UOP    10/14- will transfuse 1 unit of PRBC to help perfusion and add back octreotide and albumin

## 2025-02-01 NOTE — TELEPHONE ENCOUNTER
Again left voicemail for pt offering surgeon clinic visit for hernia repair eval and to see if he had labs done.    Called contact info for pt's mother and his sister answered the phone who states she will call her mother and the patient to see if he would like appointment. Will await return call.    Former smoker

## 2025-02-14 ENCOUNTER — PATIENT MESSAGE (OUTPATIENT)
Dept: TRANSPLANT | Facility: CLINIC | Age: 35
End: 2025-02-14
Payer: MEDICARE

## 2025-02-25 ENCOUNTER — PATIENT MESSAGE (OUTPATIENT)
Dept: SURGERY | Facility: CLINIC | Age: 35
End: 2025-02-25
Payer: MEDICARE

## 2025-03-12 LAB
EXT ABO: NORMAL
EXT ABO: NORMAL
EXT AFP: NORMAL
EXT AFP: NORMAL
EXT ALBUMIN: 3.4
EXT ALBUMIN: 3.5
EXT ALCOHOL, MEDICAL, SERUM: NORMAL
EXT ALCOHOL, MEDICAL, SERUM: NORMAL
EXT ALKALINE PHOSPHATASE: 176
EXT ALKALINE PHOSPHATASE: 179
EXT ALT: 18
EXT ALT: 32
EXT AMMONIA LEVEL: NORMAL
EXT AMMONIA LEVEL: NORMAL
EXT AMYLASE: NORMAL
EXT AMYLASE: NORMAL
EXT AST: 33
EXT AST: 49
EXT BACTERIA UA: NORMAL
EXT BACTERIA UA: NORMAL
EXT BASOPHIL%: 0.4
EXT BASOPHIL%: 0.5
EXT BILIRUBIN DIRECT: NORMAL
EXT BILIRUBIN DIRECT: NORMAL
EXT BILIRUBIN TOTAL: 3.6
EXT BILIRUBIN TOTAL: 4.2
EXT BK VIRUS DNA QN PCR: NORMAL
EXT BK VIRUS DNA QN PCR: NORMAL
EXT BK VIRUS DNA QUANT, PCR, URINE: NORMAL
EXT BK VIRUS DNA QUANT, PCR, URINE: NORMAL
EXT BLOOD (STOOL): NORMAL
EXT BLOOD (STOOL): NORMAL
EXT BLOOD CULTURE, ROUTINE: NORMAL
EXT BLOOD CULTURE, ROUTINE: NORMAL
EXT BLOOD CULTURE: NORMAL
EXT BLOOD CULTURE: NORMAL
EXT BLOOD FUNGUS: NORMAL
EXT BLOOD FUNGUS: NORMAL
EXT BLOOD WITH ANAEROBE: NORMAL
EXT BLOOD WITH ANAEROBE: NORMAL
EXT BUN: 19
EXT BUN: 49
EXT CALCIUM: 8.9
EXT CALCIUM: 9.4
EXT CDIFF (STOOL): NORMAL
EXT CDIFF (STOOL): NORMAL
EXT CHLORIDE: 102
EXT CHLORIDE: 110
EXT CHOLESTEROL: NORMAL
EXT CHOLESTEROL: NORMAL
EXT CMV ANTIGENEMIA: NORMAL
EXT CMV ANTIGENEMIA: NORMAL
EXT CMV DNA QUANT. BY PCR: NORMAL
EXT CMV DNA QUANT. BY PCR: NORMAL
EXT CO2: 20
EXT CO2: 20
EXT CREATININE UA: NORMAL
EXT CREATININE UA: NORMAL
EXT CREATININE: 1.7 MG/DL
EXT CREATININE: 3.25 MG/DL
EXT CULTURE (STOOL): NORMAL
EXT CULTURE (STOOL): NORMAL
EXT CULTURE, BODY FLUID - BACTEC: NORMAL
EXT CULTURE, BODY FLUID - BACTEC: NORMAL
EXT CYCLOSPORONE LEVEL: NORMAL
EXT CYCLOSPORONE LEVEL: NORMAL
EXT DRUGS OF ABUSE CONFIRMATION: NORMAL
EXT DRUGS OF ABUSE CONFIRMATION: NORMAL
EXT EBV DNA BY PCR: NORMAL
EXT EBV DNA BY PCR: NORMAL
EXT EBV DNA-COPIES/ML: NORMAL
EXT EBV DNA-COPIES/ML: NORMAL
EXT EBV IGG: NORMAL
EXT EBV IGG: NORMAL
EXT EBV IGM: NORMAL
EXT EBV IGM: NORMAL
EXT EGFR NO RACE VARIABLE: NORMAL
EXT EGFR NO RACE VARIABLE: NORMAL
EXT EOSINOPHIL%: 10.1
EXT EOSINOPHIL%: 5.9
EXT ERYTHROPOIETIN: NORMAL
EXT ERYTHROPOIETIN: NORMAL
EXT FERRITIN: NORMAL
EXT FERRITIN: NORMAL
EXT FOLATE: NORMAL
EXT FOLATE: NORMAL
EXT FUNGAL (STOOL): NORMAL
EXT FUNGAL (STOOL): NORMAL
EXT G6PD QUAL: NORMAL
EXT G6PD QUAL: NORMAL
EXT GFR MDRD AF AMER: NORMAL
EXT GFR MDRD AF AMER: NORMAL
EXT GFR MDRD NON AF AMER: 24
EXT GFR MDRD NON AF AMER: 53
EXT GGT: NORMAL
EXT GGT: NORMAL
EXT GLUCOSE: 92
EXT GLUCOSE: 93
EXT HBV DNA QUANT PCR: NORMAL
EXT HBV DNA QUANT PCR: NORMAL
EXT HBV DNA, QUALITATIVE PCR: NORMAL
EXT HBV DNA, QUALITATIVE PCR: NORMAL
EXT HCV QUANT: NORMAL
EXT HCV QUANT: NORMAL
EXT HDL: NORMAL
EXT HDL: NORMAL
EXT HEMATOCRIT: 32.5
EXT HEMATOCRIT: 39.6
EXT HEMOGLOBIN A1C: NORMAL
EXT HEMOGLOBIN A1C: NORMAL
EXT HEMOGLOBIN: 11.5
EXT HEMOGLOBIN: 13.4
EXT HEP B S AB: NORMAL
EXT HEP B S AB: NORMAL
EXT HEP B S AG: NORMAL
EXT HEP B S AG: NORMAL
EXT HIV RNA QUANT PCR: NORMAL
EXT HIV RNA QUANT PCR: NORMAL
EXT HIV: NORMAL
EXT HIV: NORMAL
EXT IMMUNKNOW (NON-STIMULATED): NORMAL
EXT IMMUNKNOW (NON-STIMULATED): NORMAL
EXT IMMUNKNOW (STIMULATED): NORMAL
EXT IMMUNKNOW (STIMULATED): NORMAL
EXT INR: NORMAL
EXT INR: NORMAL
EXT IRON SATURATION: NORMAL
EXT IRON SATURATION: NORMAL
EXT LDL CHOLESTEROL: NORMAL
EXT LDL CHOLESTEROL: NORMAL
EXT LIPASE: NORMAL
EXT LIPASE: NORMAL
EXT LYMPH%: 28.5
EXT LYMPH%: 40.6
EXT MAGNESIUM: NORMAL
EXT MAGNESIUM: NORMAL
EXT MONOCYTES%: 10.3
EXT MONOCYTES%: 10.5
EXT O&P (STOOL): NORMAL
EXT O&P (STOOL): NORMAL
EXT PHOSPHORUS: NORMAL
EXT PHOSPHORUS: NORMAL
EXT PLATELETS: 112
EXT PLATELETS: 56
EXT POTASSIUM: 3.1
EXT POTASSIUM: 4.3
EXT PREALBUMIN: NORMAL
EXT PREALBUMIN: NORMAL
EXT PROT/CREAT RATIO UR: NORMAL
EXT PROT/CREAT RATIO UR: NORMAL
EXT PROTEIN TOTAL: 6.9
EXT PROTEIN TOTAL: 7.4
EXT PROTEIN UA: NORMAL
EXT PROTEIN UA: NORMAL
EXT PT: NORMAL
EXT PT: NORMAL
EXT PTH, INTACT: NORMAL
EXT PTH, INTACT: NORMAL
EXT PTT: NORMAL
EXT PTT: NORMAL
EXT RBC UA: NORMAL
EXT RBC UA: NORMAL
EXT RETICULOCYTE COUNT: NORMAL
EXT RETICULOCYTE COUNT: NORMAL
EXT SARS COV-2 (COVID-19): NORMAL
EXT SARS COV-2 (COVID-19): NORMAL
EXT SEGS%: 42.9
EXT SEGS%: 50.4
EXT SIROLIMUS LVL: 0.5
EXT SIROLIMUS LVL: <0.5
EXT SODIUM: 135 MMOL/L
EXT SODIUM: 137 MMOL/L
EXT TACROLIMUS LVL: 7.5
EXT TACROLIMUS LVL: 9.8
EXT TIBC: NORMAL
EXT TIBC: NORMAL
EXT TRIGLYCERIDES: NORMAL
EXT TRIGLYCERIDES: NORMAL
EXT UNSATURATED IRON BINDING CAP.: NORMAL
EXT UNSATURATED IRON BINDING CAP.: NORMAL
EXT URIC ACID: NORMAL
EXT URIC ACID: NORMAL
EXT VIT D 1 25 DIHYDROXY: NORMAL
EXT VIT D 1 25 DIHYDROXY: NORMAL
EXT VIT D 25 HYDROXY: NORMAL
EXT VIT D 25 HYDROXY: NORMAL
EXT WBC (STOOL): NORMAL
EXT WBC (STOOL): NORMAL
EXT WBC UA: NORMAL
EXT WBC UA: NORMAL
EXT WBC: 4.6
EXT WBC: 5.9

## 2025-03-13 ENCOUNTER — PATIENT MESSAGE (OUTPATIENT)
Dept: TRANSPLANT | Facility: CLINIC | Age: 35
End: 2025-03-13
Payer: MEDICARE

## 2025-03-18 ENCOUNTER — RESULTS FOLLOW-UP (OUTPATIENT)
Dept: TRANSPLANT | Facility: CLINIC | Age: 35
End: 2025-03-18
Payer: MEDICARE

## 2025-04-02 ENCOUNTER — PATIENT MESSAGE (OUTPATIENT)
Dept: TRANSPLANT | Facility: CLINIC | Age: 35
End: 2025-04-02
Payer: MEDICARE

## 2025-04-02 ENCOUNTER — TELEPHONE (OUTPATIENT)
Dept: TRANSPLANT | Facility: CLINIC | Age: 35
End: 2025-04-02
Payer: MEDICARE

## 2025-04-02 NOTE — TELEPHONE ENCOUNTER
Called Andra Lebron.  He had his second inguinal surgery repair to the right side (laparoscopically) done on 4/1/2025.  He is interested in when he can transition back to Tacrolimus.  Explained it would be 1-3 months from date of surgery.  He would like to get labs done in a few weeks due to concerns for his kidney function. Explained will need orders for this and the labs done preoperatively may have shown him in a dehydrated state.  Encouraged hydration on day of labs.  Explained will speak with Dr. Morgan.  Patient amenable.

## 2025-04-07 ENCOUNTER — TELEPHONE (OUTPATIENT)
Dept: TRANSPLANT | Facility: CLINIC | Age: 35
End: 2025-04-07
Payer: MEDICARE

## 2025-04-07 NOTE — TELEPHONE ENCOUNTER
Phone call to patient to confirm earlier labs were okay'd per Dr. Morgan.  We scheduled them for 5/5/2025.         ----- Message from Laura Morgan MD sent at 4/2/2025  3:54 PM CDT -----  Ok to get labs per patient's request. Thank you so much for calling. MZ  ----- Message -----  From: Lubna Fine RN  Sent: 4/2/2025   3:33 PM CDT  To: Laura Morgan MD    Please see  our discussion

## 2025-04-17 ENCOUNTER — TELEPHONE (OUTPATIENT)
Dept: TRANSPLANT | Facility: CLINIC | Age: 35
End: 2025-04-17
Payer: MEDICARE

## 2025-04-17 NOTE — TELEPHONE ENCOUNTER
Notified patient will get labs on Monday the 21st of April.  Orders faxed today to lab.  Orders also in [facility] under letters.

## 2025-04-21 ENCOUNTER — PATIENT MESSAGE (OUTPATIENT)
Dept: TRANSPLANT | Facility: CLINIC | Age: 35
End: 2025-04-21
Payer: MEDICARE

## 2025-04-21 ENCOUNTER — RESULTS FOLLOW-UP (OUTPATIENT)
Dept: HEPATOLOGY | Facility: CLINIC | Age: 35
End: 2025-04-21

## 2025-04-21 LAB
EXT ALBUMIN: 3.2
EXT ALKALINE PHOSPHATASE: 138
EXT ALT: 27
EXT AST: 52
EXT BASOPHIL%: 0.3
EXT BILIRUBIN TOTAL: 4.9
EXT BUN: 40
EXT CALCIUM: 9.4
EXT CHLORIDE: 107
EXT CO2: 21
EXT CREATININE: 2.52 MG/DL
EXT EOSINOPHIL%: 3.3
EXT GFR MDRD NON AF AMER: 33
EXT GLUCOSE: 110
EXT HEMATOCRIT: 34.8
EXT HEMOGLOBIN: 11.5
EXT LYMPH%: 24.6
EXT MONOCYTES%: 10.6
EXT PLATELETS: 94
EXT POTASSIUM: 4.3
EXT PROTEIN TOTAL: 6.6
EXT SEGS%: 61.2
EXT SODIUM: 136 MMOL/L
EXT TACROLIMUS LVL: 10.1
EXT WBC: 4.9

## 2025-04-21 RX ORDER — SIROLIMUS 1 MG/1
1 TABLET, FILM COATED ORAL DAILY
Qty: 30 TABLET | Refills: 11 | Status: SHIPPED | OUTPATIENT
Start: 2025-04-21 | End: 2026-04-21

## 2025-04-21 NOTE — TELEPHONE ENCOUNTER
Spoke with patient, DR. Moragn is switching him back to sirolimus and stopping the tacrolimus, myfortic, and demedex. Patients states understanding.  He will repeat albs next week with ggt.  He denied nephrology consult at this time and wants to see if the switch will work instead.

## 2025-04-28 ENCOUNTER — TELEPHONE (OUTPATIENT)
Dept: TRANSPLANT | Facility: CLINIC | Age: 35
End: 2025-04-28
Payer: MEDICARE

## 2025-04-28 NOTE — TELEPHONE ENCOUNTER
Patient is to get labs this week.  Checking to see when he will be getting labs. Messaged patient through the patient portal.

## 2025-04-30 LAB
EXT ABO: NORMAL
EXT AFP: NORMAL
EXT ALBUMIN: 3.2
EXT ALCOHOL, MEDICAL, SERUM: NORMAL
EXT ALKALINE PHOSPHATASE: 150
EXT ALT: 32
EXT AMMONIA LEVEL: NORMAL
EXT AMYLASE: NORMAL
EXT AST: 67
EXT BACTERIA UA: NORMAL
EXT BASOPHIL%: 0.7
EXT BILIRUBIN DIRECT: 2.5 MG/DL
EXT BILIRUBIN TOTAL: 4.8
EXT BK VIRUS DNA QN PCR: NORMAL
EXT BK VIRUS DNA QUANT, PCR, URINE: NORMAL
EXT BLOOD (STOOL): NORMAL
EXT BLOOD CULTURE, ROUTINE: NORMAL
EXT BLOOD CULTURE: NORMAL
EXT BLOOD FUNGUS: NORMAL
EXT BLOOD WITH ANAEROBE: NORMAL
EXT BUN: 22
EXT CALCIUM: 8.9
EXT CDIFF (STOOL): NORMAL
EXT CHLORIDE: 109
EXT CHOLESTEROL: NORMAL
EXT CMV ANTIGENEMIA: NORMAL
EXT CMV DNA QUANT. BY PCR: NORMAL
EXT CO2: 22
EXT CREATININE UA: NORMAL
EXT CREATININE: 1.58 MG/DL
EXT CULTURE (STOOL): NORMAL
EXT CULTURE, BODY FLUID - BACTEC: NORMAL
EXT CYCLOSPORONE LEVEL: NORMAL
EXT DRUGS OF ABUSE CONFIRMATION: NORMAL
EXT EBV DNA BY PCR: NORMAL
EXT EBV DNA-COPIES/ML: NORMAL
EXT EBV IGG: NORMAL
EXT EBV IGM: NORMAL
EXT EGFR NO RACE VARIABLE: 58
EXT EOSINOPHIL%: 8.5
EXT ERYTHROPOIETIN: NORMAL
EXT FERRITIN: NORMAL
EXT FOLATE: NORMAL
EXT FUNGAL (STOOL): NORMAL
EXT G6PD QUAL: NORMAL
EXT GFR MDRD AF AMER: NORMAL
EXT GFR MDRD NON AF AMER: NORMAL
EXT GGT: 121
EXT GLUCOSE: 87
EXT HBV DNA QUANT PCR: NORMAL
EXT HBV DNA, QUALITATIVE PCR: NORMAL
EXT HCV QUANT: NORMAL
EXT HDL: NORMAL
EXT HEMATOCRIT: 33.4
EXT HEMOGLOBIN A1C: NORMAL
EXT HEMOGLOBIN: 11
EXT HEP B S AB: NORMAL
EXT HEP B S AG: NORMAL
EXT HIV RNA QUANT PCR: NORMAL
EXT HIV: NORMAL
EXT IMMUNKNOW (NON-STIMULATED): NORMAL
EXT IMMUNKNOW (STIMULATED): NORMAL
EXT INR: NORMAL
EXT IRON SATURATION: NORMAL
EXT LDL CHOLESTEROL: NORMAL
EXT LIPASE: NORMAL
EXT LYMPH%: 39.8
EXT MAGNESIUM: NORMAL
EXT MONOCYTES%: 11.2
EXT O&P (STOOL): NORMAL
EXT PHOSPHORUS: NORMAL
EXT PLATELETS: 53
EXT POTASSIUM: 4.2
EXT PREALBUMIN: NORMAL
EXT PROT/CREAT RATIO UR: NORMAL
EXT PROTEIN TOTAL: 6.1
EXT PROTEIN UA: NORMAL
EXT PT: NORMAL
EXT PTH, INTACT: NORMAL
EXT PTT: NORMAL
EXT RBC UA: NORMAL
EXT RETICULOCYTE COUNT: NORMAL
EXT SARS COV-2 (COVID-19): NORMAL
EXT SEGS%: 39.8
EXT SIROLIMUS LVL: NORMAL
EXT SODIUM: 137 MMOL/L
EXT TACROLIMUS LVL: NORMAL
EXT TIBC: NORMAL
EXT TRIGLYCERIDES: NORMAL
EXT UNSATURATED IRON BINDING CAP.: NORMAL
EXT URIC ACID: NORMAL
EXT VIT D 1 25 DIHYDROXY: NORMAL
EXT VIT D 25 HYDROXY: NORMAL
EXT WBC (STOOL): NORMAL
EXT WBC UA: NORMAL
EXT WBC: 2.9

## 2025-05-05 ENCOUNTER — RESULTS FOLLOW-UP (OUTPATIENT)
Dept: TRANSPLANT | Facility: CLINIC | Age: 35
End: 2025-05-05
Payer: MEDICARE

## 2025-05-05 ENCOUNTER — PATIENT MESSAGE (OUTPATIENT)
Dept: TRANSPLANT | Facility: CLINIC | Age: 35
End: 2025-05-05
Payer: MEDICARE

## 2025-05-05 DIAGNOSIS — Z94.4 LIVER REPLACED BY TRANSPLANT: Primary | ICD-10-CM

## 2025-05-05 LAB
EXT ALBUMIN: 3.2
EXT ALKALINE PHOSPHATASE: 150
EXT ALT: 32
EXT AST: 67
EXT BASOPHIL%: 0.7
EXT BILIRUBIN DIRECT: 2.5 MG/DL
EXT BILIRUBIN TOTAL: 4.8
EXT BUN: 22
EXT CALCIUM: 8.9
EXT CHLORIDE: 109
EXT CO2: 22
EXT CREATININE: 1.58 MG/DL
EXT EOSINOPHIL%: 8.5
EXT GFR MDRD NON AF AMER: 58
EXT GGT: 121
EXT GLUCOSE: 87
EXT HEMATOCRIT: 33.4
EXT HEMOGLOBIN: 11
EXT LYMPH%: 39.8
EXT MONOCYTES%: 11.2
EXT PLATELETS: 53
EXT POTASSIUM: 4.2
EXT PROTEIN TOTAL: 6.1
EXT SEGS%: 39.8
EXT SIROLIMUS LVL: 6.1
EXT SODIUM: 137 MMOL/L
EXT WBC: 2.9

## 2025-05-05 NOTE — TELEPHONE ENCOUNTER
Portal message sent, MRI MRCP ordered.  Repeat labs 5/12/25----- Message from Laura Morgan MD sent at 5/5/2025 12:52 PM CDT -----  Labs reviewed.    Cr much improved since last check.    LFTs remain elevated. Please schedule MRI abd w/wo contrast + MRCP next available.     Labs in 1 week. Please include reticulocytes, LDH, haptoglobin, CK. Thank you.  ----- Message -----  From: Mila Hoover RN  Sent: 5/5/2025  10:12 AM CDT  To: Laura Morgan MD

## 2025-05-06 ENCOUNTER — PATIENT MESSAGE (OUTPATIENT)
Dept: TRANSPLANT | Facility: CLINIC | Age: 35
End: 2025-05-06
Payer: MEDICARE

## 2025-05-12 ENCOUNTER — PATIENT MESSAGE (OUTPATIENT)
Dept: TRANSPLANT | Facility: CLINIC | Age: 35
End: 2025-05-12
Payer: MEDICARE

## 2025-05-16 ENCOUNTER — PATIENT MESSAGE (OUTPATIENT)
Dept: TRANSPLANT | Facility: CLINIC | Age: 35
End: 2025-05-16
Payer: MEDICARE

## 2025-05-16 ENCOUNTER — TELEPHONE (OUTPATIENT)
Dept: TRANSPLANT | Facility: CLINIC | Age: 35
End: 2025-05-16
Payer: MEDICARE

## 2025-05-16 LAB
CK SERPL-CCNC: 69 U/L
EXT ABO: NORMAL
EXT AFP: NORMAL
EXT ALBUMIN: 2.7
EXT ALCOHOL, MEDICAL, SERUM: NORMAL
EXT ALKALINE PHOSPHATASE: 162
EXT ALT: 25
EXT AMMONIA LEVEL: NORMAL
EXT AMYLASE: NORMAL
EXT AST: 53
EXT BACTERIA UA: NORMAL
EXT BASOPHIL%: 0.7
EXT BILIRUBIN DIRECT: NORMAL
EXT BILIRUBIN TOTAL: 6.5
EXT BK VIRUS DNA QN PCR: NORMAL
EXT BK VIRUS DNA QUANT, PCR, URINE: NORMAL
EXT BLOOD (STOOL): NORMAL
EXT BLOOD CULTURE, ROUTINE: NORMAL
EXT BLOOD CULTURE: NORMAL
EXT BLOOD FUNGUS: NORMAL
EXT BLOOD WITH ANAEROBE: NORMAL
EXT BUN: 22
EXT CALCIUM: 8.9
EXT CDIFF (STOOL): NORMAL
EXT CHLORIDE: 107
EXT CHOLESTEROL: NORMAL
EXT CMV ANTIGENEMIA: NORMAL
EXT CMV DNA QUANT. BY PCR: NORMAL
EXT CO2: 20
EXT CREATININE UA: NORMAL
EXT CREATININE: 1.45 MG/DL
EXT CULTURE (STOOL): NORMAL
EXT CULTURE, BODY FLUID - BACTEC: NORMAL
EXT CYCLOSPORONE LEVEL: NORMAL
EXT DRUGS OF ABUSE CONFIRMATION: NORMAL
EXT EBV DNA BY PCR: NORMAL
EXT EBV DNA-COPIES/ML: NORMAL
EXT EBV IGG: NORMAL
EXT EBV IGM: NORMAL
EXT EGFR NO RACE VARIABLE: 64
EXT EOSINOPHIL%: 7.9
EXT ERYTHROPOIETIN: NORMAL
EXT FERRITIN: NORMAL
EXT FOLATE: NORMAL
EXT FUNGAL (STOOL): NORMAL
EXT G6PD QUAL: NORMAL
EXT GFR MDRD AF AMER: NORMAL
EXT GFR MDRD NON AF AMER: NORMAL
EXT GGT: NORMAL
EXT GLUCOSE: 101
EXT HBV DNA QUANT PCR: NORMAL
EXT HBV DNA, QUALITATIVE PCR: NORMAL
EXT HCV QUANT: NORMAL
EXT HDL: NORMAL
EXT HEMATOCRIT: 31.3
EXT HEMOGLOBIN A1C: NORMAL
EXT HEMOGLOBIN: 10.3
EXT HEP B S AB: NORMAL
EXT HEP B S AG: NORMAL
EXT HIV RNA QUANT PCR: NORMAL
EXT HIV: NORMAL
EXT IMMUNKNOW (NON-STIMULATED): NORMAL
EXT IMMUNKNOW (STIMULATED): NORMAL
EXT INR: NORMAL
EXT IRON SATURATION: NORMAL
EXT LDH, TOTAL: 156 U/L
EXT LDL CHOLESTEROL: NORMAL
EXT LIPASE: NORMAL
EXT LYMPH%: 38.6
EXT MAGNESIUM: NORMAL
EXT MONOCYTES%: 13.3
EXT O&P (STOOL): NORMAL
EXT PHOSPHORUS: NORMAL
EXT PLATELETS: 68
EXT POTASSIUM: 3.8
EXT PREALBUMIN: NORMAL
EXT PROT/CREAT RATIO UR: NORMAL
EXT PROTEIN TOTAL: 5.7
EXT PROTEIN UA: NORMAL
EXT PT: NORMAL
EXT PTH, INTACT: NORMAL
EXT PTT: NORMAL
EXT RBC UA: NORMAL
EXT RETICULOCYTE COUNT: NORMAL
EXT SARS COV-2 (COVID-19): NORMAL
EXT SEGS%: 39.5
EXT SIROLIMUS LVL: NORMAL
EXT SODIUM: 135 MMOL/L
EXT TACROLIMUS LVL: NORMAL
EXT TIBC: NORMAL
EXT TRIGLYCERIDES: NORMAL
EXT UNSATURATED IRON BINDING CAP.: NORMAL
EXT URIC ACID: NORMAL
EXT VIT D 1 25 DIHYDROXY: NORMAL
EXT VIT D 25 HYDROXY: NORMAL
EXT WBC (STOOL): NORMAL
EXT WBC UA: NORMAL
EXT WBC: 3

## 2025-05-16 NOTE — TELEPHONE ENCOUNTER
Lab called with tacro level <1.5 today's sample.   Looks like he is not on it, instead he is on sirolimus.   I called patient to confirm, call went into his voicemail, where I left a message to call his coordinator tomorrow if my assessment is not accurate. His Lab ([facility]) needs to be informed to stop measuring tacro level.     ADINA Evans

## 2025-05-19 ENCOUNTER — TELEPHONE (OUTPATIENT)
Dept: TRANSPLANT | Facility: CLINIC | Age: 35
End: 2025-05-19
Payer: MEDICARE

## 2025-05-19 NOTE — TELEPHONE ENCOUNTER
Checked on results for patient's MRCP and Rapamune level.  Neither has been resulted yet.  Patient messaged through patient portal and made aware.

## 2025-05-19 NOTE — TELEPHONE ENCOUNTER
"Called patient at his request from patient advice request.  Patient would like to speak with Dr. Morgan regarding recent results and what "we are looking for.  I would like someone to explain to me.".  Confirmed with patient I would convey his request to Dr. Morgan. I also let him know I would work on getting the rest of his results in for the  To review.   "

## 2025-05-21 ENCOUNTER — RESULTS FOLLOW-UP (OUTPATIENT)
Dept: HEPATOLOGY | Facility: CLINIC | Age: 35
End: 2025-05-21
Payer: MEDICARE

## 2025-05-21 ENCOUNTER — TELEPHONE (OUTPATIENT)
Dept: TRANSPLANT | Facility: CLINIC | Age: 35
End: 2025-05-21
Payer: MEDICARE

## 2025-05-21 NOTE — TELEPHONE ENCOUNTER
Followed up by phone call with [facility] Radiology department for MRCP results.  Spoke with Geronimo again and results still not read. States she has spoken with radiologist and expressed importance of having study read asap as patient is a liver transplant patient. Expressed gratitude to Geronimo and provided with number to call coordinator directly for follow up on progress.    Will continue to check on this.

## 2025-05-22 NOTE — TELEPHONE ENCOUNTER
Communicated with patient via patient portal as follows:            Mr. Diana,   Dr. Morgan did review your labs which continue to be abnormal.  Your sirolimus level is at goal. Dr. Morgan did try to call you to speak with you about what she is trying to determine with the labs and testing. She wasn't able to get through.  She asked me to relay to you she is concerned there may be a problem with your bile ducts and that is why she ordered the MRCP.  The results will be very important.  And I still have not received them.  They will receive another call from me today.  If the MRCP does not show problems with your bile ducts, she will order a liver biopsy.  At this time she does not want to change your medications.  She would like you to repeat labs on Monday, May 26, 2025.  I will send new lab orders without the Tacrolimus level on it.  Should you have any questions or concerns, please reach out to me.   Thanks  Melony      ----- Message from Laura Morgan MD sent at 5/21/2025  4:13 PM CDT -----  Labs reviewed. LFTs remain and bilirubin remain abnormal.    Sirolimus trough at goal.     Tac level is undetectable as expected since patient is now off tacro. No need to check additional tacrolimus levels.    Labs in 1 week. Please include direct bilirubin with next set of labs.  ----- Message -----  From: Lubna Fine RN  Sent: 5/20/2025  11:02 AM CDT  To: Laura Morgan MD

## 2025-05-29 ENCOUNTER — TELEPHONE (OUTPATIENT)
Dept: TRANSPLANT | Facility: CLINIC | Age: 35
End: 2025-05-29
Payer: MEDICARE

## 2025-05-30 ENCOUNTER — TELEPHONE (OUTPATIENT)
Dept: TRANSPLANT | Facility: CLINIC | Age: 35
End: 2025-05-30
Payer: MEDICARE

## 2025-05-30 NOTE — TELEPHONE ENCOUNTER
Request received from Lubna Fine RN, to confirm receipt of 5/2025 mri from [facility] via [employer] and send Import request to [employer]. Images to be reviewed at next Liver Conference.    Receipt confirmed. Import form completed and emailed to [employer].      MRI will be added for review once images imported and Liver Conference note received. Melony notified.

## 2025-05-30 NOTE — TELEPHONE ENCOUNTER
..Patient: Jhonny Diana       MRN: [id number]      : 1990     Age: 34 y.o.  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 Recent elevated LFT's since a general surgery hernia repair in April.     Imaging to be reviewed: MRCP    HCC Treatment History: N/A    Platelets:   Lab Results   Component Value Date/Time    PLT 86 (L) 2024 05:24 AM    EXTPLATELETS 68 2025 07:45 AM     Creatinine:   Lab Results   Component Value Date/Time    CREATININE 2.5 (H) 2024 05:24 AM    EXTCREATININ 1.45 2025 07:45 AM     Bilirubin:   Lab Results   Component Value Date/Time    BILITOT 2.3 (H) 2024 05:24 AM    EXTBILITOTAL 6.5 2025 07:45 AM    EXTBILIRUBIN 2.5 2025 09:26 AM     AFP Last 3 each if available:   Lab Results   Component Value Date/Time    AFP 7.3 2018 10:37 AM       MELD: MELD 3.0: 21 at 2024  5:24 AM  MELD-Na: 19 at 2024  5:24 AM  Calculated from:  Serum Creatinine: 2.5 mg/dL at 2024  5:24 AM  Serum Sodium: 139 mmol/L (Using max of 137 mmol/L) at 2024  5:24 AM  Total Bilirubin: 2.3 mg/dL at 2024  5:24 AM  Serum Albumin: 3 g/dL at 2024  5:24 AM  INR(ratio): 1.1 at 2024 10:56 PM  Age at listing (hypothetical): 34 years  Sex: Male at 2024  5:24 AM      Plan:     Follow-up Provider:

## 2025-06-03 ENCOUNTER — CONFERENCE (OUTPATIENT)
Dept: TRANSPLANT | Facility: CLINIC | Age: 35
End: 2025-06-03
Payer: MEDICARE

## 2025-06-03 ENCOUNTER — TELEPHONE (OUTPATIENT)
Dept: TRANSPLANT | Facility: CLINIC | Age: 35
End: 2025-06-03
Payer: MEDICARE

## 2025-06-04 ENCOUNTER — TELEPHONE (OUTPATIENT)
Dept: TRANSPLANT | Facility: CLINIC | Age: 35
End: 2025-06-04
Payer: MEDICARE

## 2025-06-04 NOTE — PROVIDER TRANSFER
Outside Transfer Acceptance Note / Regional Referral Center    Referring facility:  [facility] in [place]   Referring provider: COLLEEN  Accepting facility: [facility]  Accepting provider: BAUTISTA NAILS  Admitting provider: BAUTISTA NAILS  Reason for transfer: Higher Level of Care   Transfer diagnosis: Pleural effusion, ascites  Immunocompromised  Transfer specialty requested: Transplant Liver  Transfer specialty notified: Yes  Transfer level: NUMBER 1-5: 2  Bed type requested: TELE  Isolation status: No active isolations   Admission class or status: IP- Inpatient      Narrative     Mr. 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  His post-transplant period was notable for LAURA previously on HD, anastomotic stricture requiring dilatation and stenting, and ETOH relapse (6/2019). He has followed with Dr. Julio in Hepatology, though his last clinic visit was 5/30/23.  He is now followed by Dr. Morgan in Transplant Clinic.  Per Dr. Michel at [facility], he is only on Sirolimus 1mg daily (of note, this was held a short time after his hernia repair).    He was recently admitted to [facility] in [place] on 6/2 with SOB, jaundice, and anasarca.   Since admit, he has been taking his home Sirolimis.    He was ultimately found to have a large right pleural effusion.  He had a right US guided thoracentesis on 6/3 with removal of 1000 cc of reddish-brown otherwise nonturbid fluid.  Thora fluid studies: Gluc 91, , pH 8, Prot 3.5,  with 15% PMN, RBC 20,6000.   He also had ascites, Abdominal U/S: Only trace fluid right upper quadrant insufficient for ultrasound-guided drainage.      He was also found to be in LAURA with Cr of 1.98, which then went to 2.02, and has now  improved to 1.8 again with IV diuresis (Lasix + Metolazone).  Nephrology there was consulted.  At admit: WBC 7.1, Hg 10.6, [id number], AST 46, ALT 28, TB 8.2, NH3 57, , INR 1.3.  TB today has improved to 5.6.    Yesterday, unexpectedly his blood cultures 2/2 turned positive for GPC.  He was started on Vancomycin and Rocephin 2g iv daily.  They do not have ID service there and have not done TTE or BRADEN.  Given elevated TB and bacteremia, GI there recommended transfer to center with Hepatology to help manage him with history of XPL.    Current VS 6/4 at 4pm: T 98.0F, P 80, RR 80, /55.    CXR 6/2: Large right pleural effusion causing compressive atelectasis of most of the right lung. Left lung is clear.The trachea appears severely narrowed and the right mainstem bronchus is not well seen, raising the possibility of a mediastinal mass.    Objective     Recent Labs: All pertinent labs within the past 24 hours have been reviewed.    Allergies:   Review of patient's allergies indicates:   Allergen Reactions    Bactrim [sulfamethoxazole-trimethoprim] Other (See Comments)     Mookie Trell Syndrome      NPO: No    Anticoagulation:   Anticoagulants       None             Instructions      John Quintanilla-  Admit to Hospital Medicine  Upon patient arrival to floor, please send SecureChat to [facility] or call extension [phone] (if no answer, do NOT leave a callback number after the beep, rather please send a SecureChat to [facility]), for Hospital Medicine admit team assignment and for additional admit orders for the patient.  Do not page the attending physician associated with the patient on arrival (this physician may not be on duty at the time of arrival).  Rather, always send a SecureChat to [facility] or call [phone] to reach the triage physician for orders and team assignment.

## 2025-06-06 ENCOUNTER — HOSPITAL ENCOUNTER (INPATIENT)
Facility: HOSPITAL | Age: 35
LOS: 10 days | Discharge: HOME | DRG: 442 | End: 2025-06-16
Attending: INTERNAL MEDICINE | Admitting: INTERNAL MEDICINE
Payer: MEDICARE

## 2025-06-06 DIAGNOSIS — R78.81 BACTEREMIA: ICD-10-CM

## 2025-06-06 DIAGNOSIS — Z94.4 HISTORY OF LIVER TRANSPLANT: ICD-10-CM

## 2025-06-06 DIAGNOSIS — B16.9 ACUTE VIRAL HEPATITIS B WITHOUT COMA AND WITHOUT DELTA AGENT: ICD-10-CM

## 2025-06-06 DIAGNOSIS — Z01.818 ENCOUNTER FOR PRE-TRANSPLANT EVALUATION FOR LIVER TRANSPLANT: Primary | ICD-10-CM

## 2025-06-06 DIAGNOSIS — R45.89 ANXIETY ABOUT HEALTH: ICD-10-CM

## 2025-06-06 DIAGNOSIS — R07.9 CHEST PAIN: ICD-10-CM

## 2025-06-06 DIAGNOSIS — B16.9 ACUTE HEPATITIS B: ICD-10-CM

## 2025-06-06 DIAGNOSIS — K72.90 LIVER FAILURE: ICD-10-CM

## 2025-06-06 DIAGNOSIS — N18.31 CKD STAGE 3A, GFR 45-59 ML/MIN: ICD-10-CM

## 2025-06-06 PROBLEM — E80.6 HYPERBILIRUBINEMIA: Status: ACTIVE | Noted: 2017-12-12

## 2025-06-06 PROBLEM — G89.29 CHRONIC PAIN: Status: ACTIVE | Noted: 2024-09-19

## 2025-06-06 PROBLEM — R60.0 LOWER EXTREMITY EDEMA: Status: ACTIVE | Noted: 2025-06-06

## 2025-06-06 LAB
ABSOLUTE EOSINOPHIL (OHS): 0.9 K/UL
ABSOLUTE MONOCYTE (OHS): 1.12 K/UL (ref 0.3–1)
ABSOLUTE NEUTROPHIL COUNT (OHS): 5.15 K/UL (ref 1.8–7.7)
ALBUMIN SERPL BCP-MCNC: 2.2 G/DL (ref 3.5–5.2)
ALP SERPL-CCNC: 179 UNIT/L (ref 40–150)
ALT SERPL W/O P-5'-P-CCNC: 26 UNIT/L (ref 10–44)
ANION GAP (OHS): 13 MMOL/L (ref 8–16)
AST SERPL-CCNC: 62 UNIT/L (ref 11–45)
BASOPHILS # BLD AUTO: 0.06 K/UL
BASOPHILS NFR BLD AUTO: 0.7 %
BILIRUB SERPL-MCNC: 7.1 MG/DL (ref 0.1–1)
BILIRUB UR QL STRIP.AUTO: NEGATIVE
BUN SERPL-MCNC: 28 MG/DL (ref 6–20)
CALCIUM SERPL-MCNC: 8 MG/DL (ref 8.7–10.5)
CHLORIDE SERPL-SCNC: 94 MMOL/L (ref 95–110)
CLARITY UR: CLEAR
CO2 SERPL-SCNC: 24 MMOL/L (ref 23–29)
COLOR UR AUTO: YELLOW
CREAT SERPL-MCNC: 1.7 MG/DL (ref 0.5–1.4)
CREAT UR-MCNC: 57 MG/DL (ref 23–375)
CREAT UR-MCNC: 58 MG/DL (ref 23–375)
ERYTHROCYTE [DISTWIDTH] IN BLOOD BY AUTOMATED COUNT: 13.4 % (ref 11.5–14.5)
GFR SERPLBLD CREATININE-BSD FMLA CKD-EPI: 53 ML/MIN/1.73/M2
GLUCOSE SERPL-MCNC: 93 MG/DL (ref 70–110)
GLUCOSE UR QL STRIP: NEGATIVE
HCT VFR BLD AUTO: 30.6 % (ref 40–54)
HGB BLD-MCNC: 10.4 GM/DL (ref 14–18)
HGB UR QL STRIP: NEGATIVE
HOLD SPECIMEN: NORMAL
IMM GRANULOCYTES # BLD AUTO: 0.03 K/UL (ref 0–0.04)
IMM GRANULOCYTES NFR BLD AUTO: 0.3 % (ref 0–0.5)
INR PPP: 1.2 (ref 0.8–1.2)
KETONES UR QL STRIP: NEGATIVE
LEUKOCYTE ESTERASE UR QL STRIP: NEGATIVE
LYMPHOCYTES # BLD AUTO: 1.87 K/UL (ref 1–4.8)
MAGNESIUM SERPL-MCNC: 1.6 MG/DL (ref 1.6–2.6)
MCH RBC QN AUTO: 32.7 PG (ref 27–31)
MCHC RBC AUTO-ENTMCNC: 34 G/DL (ref 32–36)
MCV RBC AUTO: 96 FL (ref 82–98)
NITRITE UR QL STRIP: NEGATIVE
NUCLEATED RBC (/100WBC) (OHS): 0 /100 WBC
OSMOLALITY UR: 306 MOSM/KG (ref 50–1200)
PH UR STRIP: 6 [PH]
PHOSPHATE SERPL-MCNC: 2.7 MG/DL (ref 2.7–4.5)
PLATELET # BLD AUTO: 134 K/UL (ref 150–450)
PMV BLD AUTO: 9.3 FL (ref 9.2–12.9)
POTASSIUM SERPL-SCNC: 3.1 MMOL/L (ref 3.5–5.1)
POTASSIUM UR-SCNC: 17 MMOL/L (ref 15–95)
PROT SERPL-MCNC: 6.8 GM/DL (ref 6–8.4)
PROT UR QL STRIP: NEGATIVE
PROT UR-MCNC: <7 MG/DL
PROT/CREAT UR: NORMAL MG/G{CREAT}
PROTHROMBIN TIME: 12.7 SECONDS (ref 9–12.5)
RBC # BLD AUTO: 3.18 M/UL (ref 4.6–6.2)
RELATIVE EOSINOPHIL (OHS): 9.9 %
RELATIVE LYMPHOCYTE (OHS): 20.5 % (ref 18–48)
RELATIVE MONOCYTE (OHS): 12.3 % (ref 4–15)
RELATIVE NEUTROPHIL (OHS): 56.3 % (ref 38–73)
SODIUM SERPL-SCNC: 131 MMOL/L (ref 136–145)
SODIUM UR-SCNC: 50 MMOL/L (ref 20–250)
SP GR UR STRIP: 1.01
UROBILINOGEN UR STRIP-ACNC: ABNORMAL EU/DL
UUN UR-MCNC: 332 MG/DL (ref 140–1050)
VANCOMYCIN SERPL-MCNC: 34.5 UG/ML (ref ?–80)
WBC # BLD AUTO: 9.13 K/UL (ref 3.9–12.7)

## 2025-06-06 PROCEDURE — 80195 ASSAY OF SIROLIMUS: CPT

## 2025-06-06 PROCEDURE — 80053 COMPREHEN METABOLIC PANEL: CPT

## 2025-06-06 PROCEDURE — 83935 ASSAY OF URINE OSMOLALITY: CPT

## 2025-06-06 PROCEDURE — 25000003 PHARM REV CODE 250

## 2025-06-06 PROCEDURE — 85025 COMPLETE CBC W/AUTO DIFF WBC: CPT

## 2025-06-06 PROCEDURE — 82570 ASSAY OF URINE CREATININE: CPT

## 2025-06-06 PROCEDURE — 93010 ELECTROCARDIOGRAM REPORT: CPT | Mod: ,,, | Performed by: INTERNAL MEDICINE

## 2025-06-06 PROCEDURE — 83735 ASSAY OF MAGNESIUM: CPT

## 2025-06-06 PROCEDURE — 87040 BLOOD CULTURE FOR BACTERIA: CPT

## 2025-06-06 PROCEDURE — 84100 ASSAY OF PHOSPHORUS: CPT

## 2025-06-06 PROCEDURE — 85610 PROTHROMBIN TIME: CPT

## 2025-06-06 PROCEDURE — 84300 ASSAY OF URINE SODIUM: CPT

## 2025-06-06 PROCEDURE — 81003 URINALYSIS AUTO W/O SCOPE: CPT

## 2025-06-06 PROCEDURE — 20600001 HC STEP DOWN PRIVATE ROOM

## 2025-06-06 PROCEDURE — 36415 COLL VENOUS BLD VENIPUNCTURE: CPT

## 2025-06-06 PROCEDURE — 63600175 PHARM REV CODE 636 W HCPCS: Mod: UD

## 2025-06-06 PROCEDURE — 84540 ASSAY OF URINE/UREA-N: CPT

## 2025-06-06 PROCEDURE — 93005 ELECTROCARDIOGRAM TRACING: CPT

## 2025-06-06 PROCEDURE — 80202 ASSAY OF VANCOMYCIN: CPT

## 2025-06-06 PROCEDURE — 84133 ASSAY OF URINE POTASSIUM: CPT

## 2025-06-06 RX ORDER — NALOXONE HCL 0.4 MG/ML
0.02 VIAL (ML) INJECTION
Status: DISCONTINUED | OUTPATIENT
Start: 2025-06-06 | End: 2025-06-16 | Stop reason: HOSPADM

## 2025-06-06 RX ORDER — MUPIROCIN 20 MG/G
OINTMENT TOPICAL 2 TIMES DAILY
Status: COMPLETED | OUTPATIENT
Start: 2025-06-06 | End: 2025-06-11

## 2025-06-06 RX ORDER — MYCOPHENOLIC ACID 360 MG/1
360 TABLET, DELAYED RELEASE ORAL 2 TIMES DAILY
Status: ON HOLD | COMMUNITY
End: 2025-06-14 | Stop reason: HOSPADM

## 2025-06-06 RX ORDER — AMOXICILLIN 250 MG
1 CAPSULE ORAL 2 TIMES DAILY PRN
Status: DISCONTINUED | OUTPATIENT
Start: 2025-06-06 | End: 2025-06-16 | Stop reason: HOSPADM

## 2025-06-06 RX ORDER — PROCHLORPERAZINE EDISYLATE 5 MG/ML
5 INJECTION INTRAMUSCULAR; INTRAVENOUS EVERY 6 HOURS PRN
Status: DISCONTINUED | OUTPATIENT
Start: 2025-06-06 | End: 2025-06-16 | Stop reason: HOSPADM

## 2025-06-06 RX ORDER — SIROLIMUS 1 MG/1
1 TABLET, FILM COATED ORAL DAILY
Status: DISCONTINUED | OUTPATIENT
Start: 2025-06-07 | End: 2025-06-16 | Stop reason: HOSPADM

## 2025-06-06 RX ORDER — IBUPROFEN 200 MG
24 TABLET ORAL
Status: DISCONTINUED | OUTPATIENT
Start: 2025-06-06 | End: 2025-06-16 | Stop reason: HOSPADM

## 2025-06-06 RX ORDER — OXYCODONE HYDROCHLORIDE 10 MG/1
30 TABLET ORAL EVERY 6 HOURS PRN
Refills: 0 | Status: DISCONTINUED | OUTPATIENT
Start: 2025-06-06 | End: 2025-06-16 | Stop reason: HOSPADM

## 2025-06-06 RX ORDER — TALC
6 POWDER (GRAM) TOPICAL NIGHTLY PRN
Status: DISCONTINUED | OUTPATIENT
Start: 2025-06-06 | End: 2025-06-16 | Stop reason: HOSPADM

## 2025-06-06 RX ORDER — CEFTRIAXONE 1 G/1
2 INJECTION, POWDER, FOR SOLUTION INTRAMUSCULAR; INTRAVENOUS
Status: DISCONTINUED | OUTPATIENT
Start: 2025-06-06 | End: 2025-06-08

## 2025-06-06 RX ORDER — IPRATROPIUM BROMIDE AND ALBUTEROL SULFATE 2.5; .5 MG/3ML; MG/3ML
3 SOLUTION RESPIRATORY (INHALATION) EVERY 4 HOURS PRN
Status: DISCONTINUED | OUTPATIENT
Start: 2025-06-06 | End: 2025-06-16 | Stop reason: HOSPADM

## 2025-06-06 RX ORDER — IBUPROFEN 200 MG
16 TABLET ORAL
Status: DISCONTINUED | OUTPATIENT
Start: 2025-06-06 | End: 2025-06-16 | Stop reason: HOSPADM

## 2025-06-06 RX ORDER — ALUMINUM HYDROXIDE, MAGNESIUM HYDROXIDE, AND SIMETHICONE 1200; 120; 1200 MG/30ML; MG/30ML; MG/30ML
30 SUSPENSION ORAL 4 TIMES DAILY PRN
Status: DISCONTINUED | OUTPATIENT
Start: 2025-06-06 | End: 2025-06-16 | Stop reason: HOSPADM

## 2025-06-06 RX ORDER — GLUCAGON 1 MG
1 KIT INJECTION
Status: DISCONTINUED | OUTPATIENT
Start: 2025-06-06 | End: 2025-06-16 | Stop reason: HOSPADM

## 2025-06-06 RX ORDER — OXYCODONE HYDROCHLORIDE 30 MG/1
30 TABLET ORAL EVERY 6 HOURS PRN
COMMUNITY

## 2025-06-06 RX ORDER — ACETAMINOPHEN 325 MG/1
650 TABLET ORAL EVERY 8 HOURS PRN
Status: DISCONTINUED | OUTPATIENT
Start: 2025-06-06 | End: 2025-06-16 | Stop reason: HOSPADM

## 2025-06-06 RX ORDER — ONDANSETRON HYDROCHLORIDE 2 MG/ML
4 INJECTION, SOLUTION INTRAVENOUS EVERY 8 HOURS PRN
Status: DISCONTINUED | OUTPATIENT
Start: 2025-06-06 | End: 2025-06-16 | Stop reason: HOSPADM

## 2025-06-06 RX ORDER — BISACODYL 10 MG/1
10 SUPPOSITORY RECTAL DAILY PRN
Status: DISCONTINUED | OUTPATIENT
Start: 2025-06-06 | End: 2025-06-16 | Stop reason: HOSPADM

## 2025-06-06 RX ORDER — DIAZEPAM 5 MG/1
5 TABLET ORAL DAILY
Status: ON HOLD | COMMUNITY
End: 2025-06-14

## 2025-06-06 RX ADMIN — OXYCODONE HYDROCHLORIDE 30 MG: 10 TABLET ORAL at 06:06

## 2025-06-06 RX ADMIN — CEFTRIAXONE 2 G: 1 INJECTION, POWDER, FOR SOLUTION INTRAMUSCULAR; INTRAVENOUS at 06:06

## 2025-06-06 RX ADMIN — VANCOMYCIN HYDROCHLORIDE 1500 MG: 1.5 INJECTION, POWDER, LYOPHILIZED, FOR SOLUTION INTRAVENOUS at 08:06

## 2025-06-06 RX ADMIN — MUPIROCIN: 20 OINTMENT TOPICAL at 09:06

## 2025-06-06 NOTE — HPI
Per  note:    Mr. 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 His post-transplant period was notable for LAURA previously on HD, anastomotic stricture requiring dilatation and stenting, and ETOH relapse (6/2019). He has followed with Dr. Julio in Hepatology, though his last clinic visit was 5/30/23. He is now followed by Dr. Morgan in Transplant Clinic. Per Dr. Michel at [facility], he is only on Sirolimus 1mg daily (of note, this was held a short time after his hernia repair).    He was recently admitted to [facility] in [place] on 6/2 with SOB, jaundice, and anasarca. Since admit, he has been taking his home Sirolimis.    He was ultimately found to have a large right pleural effusion. He had a right US guided thoracentesis on 6/3 with removal of 1000 cc of reddish-brown otherwise nonturbid fluid. Thora fluid studies: Gluc 91, , pH 8, Prot 3.5,  with 15% PMN, RBC 20,6000. He also had ascites, Abdominal U/S: Only trace fluid right upper quadrant insufficient for ultrasound-guided drainage.     He was also found to be in LAURA with Cr of 1.98, which then went to 2.02, and has now improved to 1.8 again with IV diuresis (Lasix + Metolazone). Nephrology there was consulted. At admit: WBC 7.1, Hg 10.6, [id number], AST 46, ALT 28, TB 8.2, NH3 57, , INR 1.3. TB today has improved to 5.6.    Yesterday, unexpectedly his blood cultures 2/2 turned positive for GPC. He was started on Vancomycin and Rocephin 2g iv daily. They do not have ID service there and have not done TTE or BRADEN. Given elevated TB and bacteremia, GI there recommended transfer to center with Hepatology to help manage him with history of XPL.    Current VS 6/4 at 4pm: T 98.0F, P 80, RR 80, /55.    CXR 6/2: Large  right pleural effusion causing compressive atelectasis of most of the right lung. Left lung is clear.The trachea appears severely narrowed and the right mainstem bronchus is not well seen, raising the possibility of a mediastinal mass.

## 2025-06-06 NOTE — ASSESSMENT & PLAN NOTE
"Mostly resolved at this point.    Hx of LAURA on CKD at OSH with cr rising to 2.5. Most recently before transfer was 1.69. baseline ~ 1.6-1.7. Started on Lasix 80 IV daily, metolazone 5mg daily, spironolactone 100mg PO daily  at OSH with minor improvement in edema.       - urine studies ordered   - monitor UOP  - consult nephrology         LAURA is likely due to unclear. Baseline creatinine is 1.6-1.7. Most recent creatinine and eGFR are listed below.  No results for input(s): "CREATININE", "EGFRNORACEVR" in the last 72 hours.   Plan  - LAURA is stable  - Avoid nephrotoxins and renally dose meds for GFR listed above  - Monitor urine output, serial BMP, and adjust therapy as needed  "

## 2025-06-06 NOTE — PLAN OF CARE
Pt. aao x 3  Pt. Has pain management in place  Pt. Skin cdi  Pt. Safety maintained  Pt. Ambulates  Pt. Abdomen is distended  Pt. Has edema to ble  Pt. Call light in reach with bed in the lowest position

## 2025-06-06 NOTE — ASSESSMENT & PLAN NOTE
2/2 blood cultures positive for GPC at OSH. Microbiology results not in transfer packet. No TTE or BRADEN done.     - started on vanc/cftx at OSH on 6/2, will continue for now   - f/up speciation and sensitivities from OSH  - repeat Bd cx ordered   - TTE ordered

## 2025-06-06 NOTE — PROGRESS NOTES
Pharmacokinetic Initial Assessment: IV Vancomycin    Assessment/Plan:    Initiate intravenous vancomycin with loading dose of 1500 mg once with subsequent doses when random concentrations are less than 20 mcg/mL  Desired empiric serum trough concentration is 15 to 20 mcg/mL  Draw vancomycin random level on 06/7 at morning draw.  Pharmacy will continue to follow and monitor vancomycin.      Please contact pharmacy at extension [phone] with any questions regarding this assessment.     Thank you for the consult,   Marley Gomez       Patient brief summary:  Jhonny Diana is a 34 y.o. male initiated on antimicrobial therapy with IV Vancomycin for treatment of suspected bacteremia    Drug Allergies:   Review of patient's allergies indicates:   Allergen Reactions    Bactrim [sulfamethoxazole-trimethoprim] Other (See Comments)     Mookie Trell Syndrome       Actual Body Weight:   68.6    Renal Function:   Estimated Creatinine Clearance: 54.7 mL/min (A) (based on SCr of 1.7 mg/dL (H)).,     Dialysis Method (if applicable):  N/A    CBC (last 72 hours):  Recent Labs   Lab Result Units 06/06/25  1635   WBC K/uL 9.13   HGB gm/dL 10.4*   HCT % 30.6*   Platelet Count K/uL 134*   Lymph % % 20.5   Mono % % 12.3   Eos % % 9.9*   Basophil % % 0.7       Metabolic Panel (last 72 hours):  Recent Labs   Lab Result Units 06/06/25  1635   Sodium mmol/L 131*   Potassium mmol/L 3.1*   Chloride mmol/L 94*   CO2 mmol/L 24   Glucose mg/dL 93   BUN mg/dL 28*   Creatinine mg/dL 1.7*   Albumin g/dL 2.2*   Bilirubin Total mg/dL 7.1*   ALP unit/L 179*   AST unit/L 62*   ALT unit/L 26   Magnesium  mg/dL 1.6   Phosphorus Level mg/dL 2.7       Drug levels (last 3 results):  Recent Labs   Lab Result Units 06/06/25  1635   Vancomycin Random ug/ml 34.5       Microbiologic Results:  Microbiology Results (last 7 days)       Procedure Component Value Units Date/Time    Blood culture [[id number]] Collected: 06/06/25 1634    Order Status: Sent  Specimen: Blood from Peripheral, Antecubital, Left     Blood culture [[id number]] Collected: 06/06/25 1634    Order Status: Sent Specimen: Blood from Peripheral, Forearm, Left

## 2025-06-06 NOTE — ASSESSMENT & PLAN NOTE
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 Follows with Dr. Morgan. Hx of anastomotic stricture requiring dilatation and stenting. Had been having uptrending bilirubin and MRCP ordered outpatient, done at [facility] as outpatient. Admitted to [facility] with Tbili of 8, ascites, transudative pleural effusion.     - RUQ US ordered   - f/u MRCP results from OSH   - continue sirolimus 1mg BID  - sirolimus level pending   - hepatology consulted   - valaclovir PPx continued

## 2025-06-06 NOTE — PROGRESS NOTES
Pt. aao x 3 sitting up in bed with nad noted; c/o pain to abdomen 8/10 on pain scale; prn pain medication given as ordered; bed in the lowest position with call light in reach; care continued.

## 2025-06-06 NOTE — H&P
John Quintanilla - Transplant [facility] Medicine  History & Physical    Patient Name: Jhonny Diana  MRN: [id number]  Patient Class: IP- Inpatient  Admission Date: 6/6/2025  Attending Physician: Nilda Cruz MD   Primary Care Provider: Elo Bull MD         Patient information was obtained from patient and past medical records.     Subjective:     Principal Problem:<principal problem not specified>    Chief Complaint:   Chief Complaint   Patient presents with    Liver Transplant Follow-up        HPI:  Per  note:    Mr. 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 His post-transplant period was notable for LAURA previously on HD, anastomotic stricture requiring dilatation and stenting, and ETOH relapse (6/2019). He has followed with Dr. Julio in Hepatology, though his last clinic visit was 5/30/23. He is now followed by Dr. Morgan in Transplant Clinic. Per Dr. Michel at [facility], he is only on Sirolimus 1mg daily (of note, this was held a short time after his hernia repair).    He was recently admitted to [facility] in [place] on 6/2 with SOB, jaundice, and anasarca. Since admit, he has been taking his home Sirolimis.    He was ultimately found to have a large right pleural effusion. He had a right US guided thoracentesis on 6/3 with removal of 1000 cc of reddish-brown otherwise nonturbid fluid. Thora fluid studies: Gluc 91, , pH 8, Prot 3.5,  with 15% PMN, RBC 20,6000. He also had ascites, Abdominal U/S: Only trace fluid right upper quadrant insufficient for ultrasound-guided drainage.     He was also found to be in LAURA with Cr of 1.98, which then went to 2.02, and has now improved to 1.8 again with IV diuresis (Lasix + Metolazone). Nephrology there was  consulted. At admit: WBC 7.1, Hg 10.6, [id number], AST 46, ALT 28, TB 8.2, NH3 57, , INR 1.3. TB today has improved to 5.6.    Yesterday, unexpectedly his blood cultures 2/2 turned positive for GPC. He was started on Vancomycin and Rocephin 2g iv daily. They do not have ID service there and have not done TTE or BRADEN. Given elevated TB and bacteremia, GI there recommended transfer to center with Hepatology to help manage him with history of XPL.    Current VS 6/4 at 4pm: T 98.0F, P 80, RR 80, /55.    CXR 6/2: Large right pleural effusion causing compressive atelectasis of most of the right lung. Left lung is clear.The trachea appears severely narrowed and the right mainstem bronchus is not well seen, raising the possibility of a mediastinal mass.     Past Medical History:   Diagnosis Date    Abdominal pain 05/27/2018    Alcoholic cirrhosis 08/14/2018    Alcoholic hepatitis with ascites     Anemia     CKD (chronic kidney disease) stage 3, GFR 30-59 ml/min 08/14/2018    Encounter for blood transfusion     multiple blood transfusions    ESRD on dialysis     Herpes zoster without complication 12/11/2019    History of hematemesis 09/28/2017    Hypertension     Seizures     Substance abuse     alcohol and opioids    Thrombocytopenia     Transplanted liver     10/2017       Past Surgical History:   Procedure Laterality Date    APPENDECTOMY      CENTRAL VENOUS CATHETER TUNNELED INSERTION DOUBLE LUMEN      inserted and removed    ERCP      ERCP N/A 7/2/2018    Procedure: ERCP;  Surgeon: Solis Villanueva MD;  Location: [facility] ([address] [place] [address]);  Service: Endoscopy;  Laterality: N/A;  dialysis/cirrhosis/labs prior to ERCP/svn  platelet    ESOPHAGOGASTRODUODENOSCOPY      ESOPHAGOGASTRODUODENOSCOPY N/A 11/25/2019    Procedure: EGD (ESOPHAGOGASTRODUODENOSCOPY);  Surgeon: Sheri Siddiqui MD;  Location: [facility] ([address] [place] [address]);  Service: Endoscopy;  Laterality: N/A;    LIVER TRANSPLANT      10/2017    REPAIR, HERNIA, INGUINAL, WITHOUT  HISTORY OF PRIOR REPAIR, AGE 5 YEARS OR OLDER Left 11/26/2024    Procedure: REPAIR, HERNIA, INGUINAL, WITHOUT HISTORY OF PRIOR REPAIR, AGE 5 YEARS OR OLDER;  Surgeon: Elian Bazan MD;  Location: [facility] OR [address] [place] [address];  Service: General;  Laterality: Left;       Review of patient's allergies indicates:   Allergen Reactions    Bactrim [sulfamethoxazole-trimethoprim] Other (See Comments)     Mookie Trell Syndrome       No current facility-administered medications on file prior to encounter.     Current Outpatient Medications on File Prior to Encounter   Medication Sig    diazePAM (VALIUM) 5 MG tablet Take 5 mg by mouth Daily.    mycophenolate sodium 360 MG TbEC Take 360 mg by mouth 2 (two) times daily.    oxyCODONE (ROXICODONE) 30 MG Tab Take 30 mg by mouth every 6 (six) hours as needed (pain).    sirolimus (RAPAMUNE) 1 MG Tab Take 1 tablet (1 mg total) by mouth once daily.    tiZANidine 4 mg Cap Take 1 tablet by mouth every evening.    valACYclovir (VALTREX) 500 MG tablet Take 500 mg by mouth once daily.    naloxone (NARCAN) 4 mg/actuation Spry CALL 911. SPR CONTENTS OF ONE SPRAYER (0.1ML) INTO ONE NOSTRIL. REPEAT IN 2-3 MIN IF SYMPTOMS OF OPIOID EMERGENCY PERSIST, ALTERNATE NOSTRILS    [DISCONTINUED] diazePAM (VALIUM) 2 MG tablet Take 2 mg by mouth 2 (two) times a day.    [DISCONTINUED] lactulose (CHRONULAC) 10 gram/15 mL solution Take 30 mLs (20 g total) by mouth 3 (three) times daily.    [DISCONTINUED] naloxegoL (MOVANTIK) 12.5 mg Tab Take 12.5 mg by mouth once daily.    [DISCONTINUED] oxyCODONE (ROXICODONE) 15 MG Tab Take 10 mg by mouth 3 (three) times daily.    [DISCONTINUED] torsemide (DEMADEX) 10 MG Tab      Family History       Problem Relation (Age of Onset)    Alcohol abuse Father    Cancer Father    No Known Problems Mother, Sister, Brother          Tobacco Use    Smoking status: Former     Current packs/day: 0.00     Average packs/day: 1 pack/day for 10.0 years (10.0 ttl pk-yrs)     Types: Cigarettes      Start date: 2007     Quit date: 2017     Years since quittin.2     Passive exposure: Current    Smokeless tobacco: Never    Tobacco comments:     Medical Marijuana smoker.    Substance and Sexual Activity    Alcohol use: No     Comment: a fifth of liquor daily for years, cut back over last 2 months    Drug use: No    Sexual activity: Not Currently     Partners: Male     Comment: single     Review of Systems  Objective:     Vital Signs (Most Recent):  Temp: 98.7 °F (37.1 °C) (25 1622)  Pulse: 95 (25 1622)  Resp: 18 (25 1622)  BP: 134/63 (25 1622)  SpO2: 95 % (25 1622) Vital Signs (24h Range):  Temp:  [98.7 °F (37.1 °C)-100.3 °F (37.9 °C)] 98.7 °F (37.1 °C)  Pulse:  [84-95] 95  Resp:  [18] 18  SpO2:  [94 %-95 %] 95 %  BP: (124-134)/(49-63) 134/63     Weight: 68.6 kg (151 lb 5.5 oz)  Body mass index is 24.43 kg/m².     Physical Exam      Physical Exam  Gen: in NAD, appears stated age, some temporal wasting   Neuro: AAOx4, CN2-12 grossly intact BL; motor, sensory, and strength grossly intact BL  HEENT: NTNC, EOMI, PERRLA, MMM; no lymphadenopathy; no JVD appreciated  CVS: RRR, no m/r/g; S1/S2 auscultated with no S3 or S4; capillary refill < 2 sec  Resp: Left lung clear to auscultation. Right lung with audible breath sounds in RUL, but mid to base with minimal breath sounds.   Abd: large transverse well healed scar. TTP on RUQ, chronic per patient   Extrem: edema b/l LE        Significant Labs: All pertinent labs within the past 24 hours have been reviewed.  No labs currently. Transferred from OSH. Initial labs pending currently     Significant Imaging: I have reviewed all pertinent imaging results/findings within the past 24 hours.  No Labs currently. Transferred from OSH. Initial Labs pending currently.   Assessment/Plan:     Assessment & Plan  Pleural effusion    Presented with new onset SOB at OSH. Found to have Large right pleural effusion causing compressive atelectasis  of most of the right lung on 6/2 CXR. Underwent thora  Right US guided thoracentesis on 6/3 with removal of 1000 cc of reddish-brown otherwise nonturbid fluid. Thora fluid studies: Gluc 91, , pH 8, Prot 3.5,  with 15% PMN, RBC 20,6000.   Studies consistent with transudative effusion. Likely from ascites     - CT chest non con ordered, given CKD   - pulm consult for repeat thora deferred as patient symptomatically at baseline, pending CT chest results and/or clinical deterioration   - follow up cytology reported sent at Osh           Patient found to have large pleural effusion on imaging. I have personally reviewed and interpreted the following imaging: Xray. A thoracentesis was performed. Pleural fluid was sent for analysis. Labs reviewed, including see abve. Fluid most consistent with Transudate.  . Most likely etiology includes Cirrhosis. Management to include Diuresis    Hyperbilirubinemia  History of liver transplant  Immunosuppression      Liver tx in 2017 2/2 Etoh cirrhosis. Follows with Dr. Morgan. Hx of anastomotic stricture requiring dilatation and stenting. Had been having uptrending bilirubin and MRCP ordered outpatient, done at Osh as outpatient. Admitted to Osh with Tbili of 8, ascites, transudative pleural effusion.     - RUQ US ordered   - f/u MRCP results from OSH   - continue sirolimus 1mg BID  - sirolimus level pending   - hepatology consulted   - valaclovir PPx continued     Ascites  Lower extremity edema    Started on Lasix 80 IV daily, metolazone 5mg daily, spironolactone 100mg PO daily  at OSH with minor improvement in edema.   - continue Lasix, metolazone, spironolactone     Acute kidney injury superimposed on CKD  Mostly resolved at this point.    Hx of LAURA on CKD at OSH with cr rising to 2.5. Most recently before transfer was 1.69. baseline ~ 1.6-1.7. Started on Lasix 80 IV daily, metolazone 5mg daily, spironolactone 100mg PO daily  at OSH with minor improvement in edema.  "      - urine studies ordered   - monitor UOP  - consult nephrology         LAURA is likely due to unclear. Baseline creatinine is 1.6-1.7. Most recent creatinine and eGFR are listed below.  No results for input(s): "CREATININE", "EGFRNORACEVR" in the last 72 hours.   Plan  - LAURA is stable  - Avoid nephrotoxins and renally dose meds for GFR listed above  - Monitor urine output, serial BMP, and adjust therapy as needed  Bacteremia    2/2 blood cultures positive for GPC at OSH. Microbiology results not in transfer packet. No TTE or BRADEN done.     - started on vanc/cftx at OSH on 6/2, will continue for now   - f/up speciation and sensitivities from OSH  - repeat Bd cx ordered   - TTE ordered       Opioid dependence  Chronic pain    Follows with pain hospitalist. Takes 30mg oxycodone QID     - continue oxycodone 30mg q6h PRN to avoid withdrawals while inpatient         VTE Risk Mitigation (From admission, onward)           Ordered     IP VTE LOW RISK PATIENT  Once         06/06/25 1604     Place sequential compression device  Until discontinued         06/06/25 1604                                    Nilda Cruz MD  Department of Hospital Medicine  [facility] - Transplant Stepdown          "

## 2025-06-06 NOTE — ASSESSMENT & PLAN NOTE
Started on Lasix 80 IV daily, metolazone 5mg daily, spironolactone 100mg PO daily  at OSH with minor improvement in edema.   - continue Lasix, metolazone, spironolactone

## 2025-06-06 NOTE — SUBJECTIVE & OBJECTIVE
Past Medical History:   Diagnosis Date    Abdominal pain 05/27/2018    Alcoholic cirrhosis 08/14/2018    Alcoholic hepatitis with ascites     Anemia     CKD (chronic kidney disease) stage 3, GFR 30-59 ml/min 08/14/2018    Encounter for blood transfusion     multiple blood transfusions    ESRD on dialysis     Herpes zoster without complication 12/11/2019    History of hematemesis 09/28/2017    Hypertension     Seizures     Substance abuse     alcohol and opioids    Thrombocytopenia     Transplanted liver     10/2017       Past Surgical History:   Procedure Laterality Date    APPENDECTOMY      CENTRAL VENOUS CATHETER TUNNELED INSERTION DOUBLE LUMEN      inserted and removed    ERCP      ERCP N/A 7/2/2018    Procedure: ERCP;  Surgeon: Solis Villanueva MD;  [facility]  Service: Endoscopy;  Laterality: N/A;  dialysis/cirrhosis/labs prior to ERCP/svn  platelet    ESOPHAGOGASTRODUODENOSCOPY      ESOPHAGOGASTRODUODENOSCOPY N/A 11/25/2019    Procedure: EGD (ESOPHAGOGASTRODUODENOSCOPY);  Surgeon: Sheri Siddiqui MD;  Location: [facility] ([address] [place] [address]);  Service: Endoscopy;  Laterality: N/A;    LIVER TRANSPLANT      10/2017    REPAIR, HERNIA, INGUINAL, WITHOUT HISTORY OF PRIOR REPAIR, AGE 4 YEARS OR OLDER Left 11/26/2024    Procedure: REPAIR, HERNIA, INGUINAL, WITHOUT HISTORY OF PRIOR REPAIR, AGE 4 YEARS OR OLDER;  Surgeon: Elian Bazan MD;  Location: [facility] OR [address] [place] [address];  Service: General;  Laterality: Left;       Review of patient's allergies indicates:   Allergen Reactions    Bactrim [sulfamethoxazole-trimethoprim] Other (See Comments)     Mookie Trell Syndrome       No current facility-administered medications on file prior to encounter.     Current Outpatient Medications on File Prior to Encounter   Medication Sig    diazePAM (VALIUM) 5 MG tablet Take 5 mg by mouth Daily.    mycophenolate sodium 360 MG TbEC Take 360 mg by mouth 2 (two) times daily.    oxyCODONE (ROXICODONE) 30 MG Tab Take 30 mg by mouth  every 6 (six) hours as needed (pain).    sirolimus (RAPAMUNE) 1 MG Tab Take 1 tablet (1 mg total) by mouth once daily.    tiZANidine 4 mg Cap Take 1 tablet by mouth every evening.    valACYclovir (VALTREX) 500 MG tablet Take 500 mg by mouth once daily.    naloxone (NARCAN) 4 mg/actuation Spry CALL 911. SPR CONTENTS OF ONE SPRAYER (0.1ML) INTO ONE NOSTRIL. REPEAT IN 2-3 MIN IF SYMPTOMS OF OPIOID EMERGENCY PERSIST, ALTERNATE NOSTRILS    [DISCONTINUED] diazePAM (VALIUM) 2 MG tablet Take 2 mg by mouth 2 (two) times a day.    [DISCONTINUED] lactulose (CHRONULAC) 10 gram/15 mL solution Take 30 mLs (20 g total) by mouth 3 (three) times daily.    [DISCONTINUED] naloxegoL (MOVANTIK) 12.5 mg Tab Take 12.5 mg by mouth once daily.    [DISCONTINUED] oxyCODONE (ROXICODONE) 15 MG Tab Take 10 mg by mouth 3 (three) times daily.    [DISCONTINUED] torsemide (DEMADEX) 10 MG Tab      Family History       Problem Relation (Age of Onset)    Alcohol abuse Father    Cancer Father    No Known Problems Mother, Sister, Brother          Tobacco Use    Smoking status: Former     Current packs/day: 0.00     Average packs/day: 1 pack/day for 10.0 years (10.0 ttl pk-yrs)     Types: Cigarettes     Start date: 2007     Quit date: 2017     Years since quittin.2     Passive exposure: Current    Smokeless tobacco: Never    Tobacco comments:     Medical Marijuana smoker.    Substance and Sexual Activity    Alcohol use: No     Comment: a fifth of liquor daily for years, cut back over last 2 months    Drug use: No    Sexual activity: Not Currently     Partners: Male     Comment: single     Review of Systems  Objective:     Vital Signs (Most Recent):  Temp: 98.7 °F (37.1 °C) (25 1622)  Pulse: 95 (25 1622)  Resp: 18 (25 162)  BP: 134/63 (25 1622)  SpO2: 95 % (25 162) Vital Signs (24h Range):  Temp:  [98.7 °F (37.1 °C)-100.3 °F (37.9 °C)] 98.7 °F (37.1 °C)  Pulse:  [84-95] 95  Resp:  [18] 18  SpO2:  [94 %-95 %] 95  %  BP: (124-134)/(49-63) 134/63     Weight: 68.6 kg (151 lb 5.5 oz)  Body mass index is 24.43 kg/m².     Physical Exam      Physical Exam  Gen: in NAD, appears stated age, some temporal wasting   Neuro: AAOx4, CN2-12 grossly intact BL; motor, sensory, and strength grossly intact BL  HEENT: NTNC, EOMI, PERRLA, MMM; no lymphadenopathy; no JVD appreciated  CVS: RRR, no m/r/g; S1/S2 auscultated with no S3 or S4; capillary refill < 2 sec  Resp: Left lung clear to auscultation. Right lung with audible breath sounds in RUL, but mid to base with minimal breath sounds.   Abd: large transverse well healed scar. TTP on RUQ, chronic per patient   Extrem: edema b/l LE        Significant Labs: All pertinent labs within the past 24 hours have been reviewed.  No labs currently. Transferred from OSH. Initial labs pending currently     Significant Imaging: I have reviewed all pertinent imaging results/findings within the past 24 hours.  No Labs currently. Transferred from OSH. Initial Labs pending currently.

## 2025-06-06 NOTE — NURSING
Pt. aao x 3 arrived to facility per ambulance from a  [facility] per stretcher; mom at the bedside.

## 2025-06-06 NOTE — ASSESSMENT & PLAN NOTE
Follows with pain hospitalist. Takes 30mg oxycodone QID     - continue oxycodone 30mg q6h PRN to avoid withdrawals while inpatient

## 2025-06-06 NOTE — ASSESSMENT & PLAN NOTE
Presented with new onset SOB at OSH. Found to have Large right pleural effusion causing compressive atelectasis of most of the right lung on 6/2 CXR. Underwent thora  Right US guided thoracentesis on 6/3 with removal of 1000 cc of reddish-brown otherwise nonturbid fluid. Thora fluid studies: Gluc 91, , pH 8, Prot 3.5,  with 15% PMN, RBC 20,6000.   Studies consistent with transudative effusion. Likely from ascites     - CT chest non con ordered, given CKD   - pulm consult for repeat thora deferred as patient symptomatically at baseline, pending CT chest results and/or clinical deterioration   - follow up cytology reported sent at Osh           Patient found to have large pleural effusion on imaging. I have personally reviewed and interpreted the following imaging: Xray. A thoracentesis was performed. Pleural fluid was sent for analysis. Labs reviewed, including see abve. Fluid most consistent with Transudate.  . Most likely etiology includes Cirrhosis. Management to include Diuresis

## 2025-06-07 PROBLEM — E87.1 HYPONATREMIA: Status: ACTIVE | Noted: 2025-06-07

## 2025-06-07 PROBLEM — N18.31 CKD STAGE 3A, GFR 45-59 ML/MIN: Status: ACTIVE | Noted: 2025-06-07

## 2025-06-07 PROBLEM — E87.6 HYPOKALEMIA: Status: ACTIVE | Noted: 2025-06-07

## 2025-06-07 LAB
ABSOLUTE EOSINOPHIL (OHS): 0.53 K/UL
ABSOLUTE MONOCYTE (OHS): 1.01 K/UL (ref 0.3–1)
ABSOLUTE NEUTROPHIL COUNT (OHS): 2.88 K/UL (ref 1.8–7.7)
ALBUMIN SERPL BCP-MCNC: 1.8 G/DL (ref 3.5–5.2)
ALP SERPL-CCNC: 124 UNIT/L (ref 40–150)
ALT SERPL W/O P-5'-P-CCNC: 24 UNIT/L (ref 10–44)
ANION GAP (OHS): 8 MMOL/L (ref 8–16)
AST SERPL-CCNC: 55 UNIT/L (ref 11–45)
BASOPHILS # BLD AUTO: 0.05 K/UL
BASOPHILS NFR BLD AUTO: 0.8 %
BILIRUB SERPL-MCNC: 5.8 MG/DL (ref 0.1–1)
BUN SERPL-MCNC: 27 MG/DL (ref 6–20)
CALCIUM SERPL-MCNC: 7.6 MG/DL (ref 8.7–10.5)
CHLORIDE SERPL-SCNC: 99 MMOL/L (ref 95–110)
CO2 SERPL-SCNC: 24 MMOL/L (ref 23–29)
CREAT SERPL-MCNC: 1.5 MG/DL (ref 0.5–1.4)
ERYTHROCYTE [DISTWIDTH] IN BLOOD BY AUTOMATED COUNT: 13.5 % (ref 11.5–14.5)
GFR SERPLBLD CREATININE-BSD FMLA CKD-EPI: >60 ML/MIN/1.73/M2
GLUCOSE SERPL-MCNC: 115 MG/DL (ref 70–110)
HCT VFR BLD AUTO: 25.5 % (ref 40–54)
HGB BLD-MCNC: 8.5 GM/DL (ref 14–18)
IMM GRANULOCYTES # BLD AUTO: 0.03 K/UL (ref 0–0.04)
IMM GRANULOCYTES NFR BLD AUTO: 0.5 % (ref 0–0.5)
INR PPP: 1.3 (ref 0.8–1.2)
LYMPHOCYTES # BLD AUTO: 1.84 K/UL (ref 1–4.8)
MAGNESIUM SERPL-MCNC: 1.7 MG/DL (ref 1.6–2.6)
MCH RBC QN AUTO: 32.9 PG (ref 27–31)
MCHC RBC AUTO-ENTMCNC: 33.3 G/DL (ref 32–36)
MCV RBC AUTO: 99 FL (ref 82–98)
NUCLEATED RBC (/100WBC) (OHS): 0 /100 WBC
OHS QRS DURATION: 104 MS
OHS QTC CALCULATION: 485 MS
PHOSPHATE SERPL-MCNC: 2.4 MG/DL (ref 2.7–4.5)
PLATELET # BLD AUTO: 88 K/UL (ref 150–450)
PMV BLD AUTO: 9.7 FL (ref 9.2–12.9)
POCT GLUCOSE: 105 MG/DL (ref 70–110)
POCT GLUCOSE: 109 MG/DL (ref 70–110)
POCT GLUCOSE: 116 MG/DL (ref 70–110)
POTASSIUM SERPL-SCNC: 3.4 MMOL/L (ref 3.5–5.1)
PROT SERPL-MCNC: 5.7 GM/DL (ref 6–8.4)
PROTHROMBIN TIME: 13.5 SECONDS (ref 9–12.5)
RBC # BLD AUTO: 2.58 M/UL (ref 4.6–6.2)
RELATIVE EOSINOPHIL (OHS): 8.4 %
RELATIVE LYMPHOCYTE (OHS): 29 % (ref 18–48)
RELATIVE MONOCYTE (OHS): 15.9 % (ref 4–15)
RELATIVE NEUTROPHIL (OHS): 45.4 % (ref 38–73)
SIROLIMUS BLD-MCNC: 10.7 NG/ML (ref 4–20)
SODIUM SERPL-SCNC: 131 MMOL/L (ref 136–145)
VANCOMYCIN SERPL-MCNC: 40.6 UG/ML (ref ?–80)
WBC # BLD AUTO: 6.34 K/UL (ref 3.9–12.7)

## 2025-06-07 PROCEDURE — 85610 PROTHROMBIN TIME: CPT

## 2025-06-07 PROCEDURE — 99223 1ST HOSP IP/OBS HIGH 75: CPT | Mod: ,,, | Performed by: INTERNAL MEDICINE

## 2025-06-07 PROCEDURE — 80053 COMPREHEN METABOLIC PANEL: CPT

## 2025-06-07 PROCEDURE — 83735 ASSAY OF MAGNESIUM: CPT

## 2025-06-07 PROCEDURE — 25000003 PHARM REV CODE 250

## 2025-06-07 PROCEDURE — G0545 PR VISIT INHERENT TO INPT OR OBS CARE, INFECTIOUS DISEASE: HCPCS | Mod: ,,, | Performed by: INTERNAL MEDICINE

## 2025-06-07 PROCEDURE — 20600001 HC STEP DOWN PRIVATE ROOM

## 2025-06-07 PROCEDURE — 84100 ASSAY OF PHOSPHORUS: CPT

## 2025-06-07 PROCEDURE — 63600175 PHARM REV CODE 636 W HCPCS: Mod: UD

## 2025-06-07 PROCEDURE — 36415 COLL VENOUS BLD VENIPUNCTURE: CPT

## 2025-06-07 PROCEDURE — 25000003 PHARM REV CODE 250: Performed by: HOSPITALIST

## 2025-06-07 PROCEDURE — 36415 COLL VENOUS BLD VENIPUNCTURE: CPT | Performed by: STUDENT IN AN ORGANIZED HEALTH CARE EDUCATION/TRAINING PROGRAM

## 2025-06-07 PROCEDURE — 80202 ASSAY OF VANCOMYCIN: CPT

## 2025-06-07 PROCEDURE — 85025 COMPLETE CBC W/AUTO DIFF WBC: CPT

## 2025-06-07 PROCEDURE — 99222 1ST HOSP IP/OBS MODERATE 55: CPT | Mod: ,,, | Performed by: INTERNAL MEDICINE

## 2025-06-07 PROCEDURE — 63600175 PHARM REV CODE 636 W HCPCS: Performed by: STUDENT IN AN ORGANIZED HEALTH CARE EDUCATION/TRAINING PROGRAM

## 2025-06-07 PROCEDURE — P9047 ALBUMIN (HUMAN), 25%, 50ML: HCPCS | Performed by: STUDENT IN AN ORGANIZED HEALTH CARE EDUCATION/TRAINING PROGRAM

## 2025-06-07 PROCEDURE — 80321 ALCOHOLS BIOMARKERS 1OR 2: CPT | Performed by: STUDENT IN AN ORGANIZED HEALTH CARE EDUCATION/TRAINING PROGRAM

## 2025-06-07 RX ORDER — FUROSEMIDE 10 MG/ML
80 INJECTION INTRAMUSCULAR; INTRAVENOUS ONCE
Status: COMPLETED | OUTPATIENT
Start: 2025-06-07 | End: 2025-06-07

## 2025-06-07 RX ORDER — DIAZEPAM 5 MG/1
5 TABLET ORAL DAILY
Status: DISCONTINUED | OUTPATIENT
Start: 2025-06-07 | End: 2025-06-07

## 2025-06-07 RX ORDER — DIAZEPAM 5 MG/1
5 TABLET ORAL NIGHTLY
Status: DISCONTINUED | OUTPATIENT
Start: 2025-06-08 | End: 2025-06-16 | Stop reason: HOSPADM

## 2025-06-07 RX ORDER — ALBUMIN HUMAN 250 G/1000ML
25 SOLUTION INTRAVENOUS ONCE
Status: COMPLETED | OUTPATIENT
Start: 2025-06-07 | End: 2025-06-07

## 2025-06-07 RX ADMIN — OXYCODONE HYDROCHLORIDE 30 MG: 10 TABLET ORAL at 12:06

## 2025-06-07 RX ADMIN — FUROSEMIDE 80 MG: 10 INJECTION, SOLUTION INTRAMUSCULAR; INTRAVENOUS at 08:06

## 2025-06-07 RX ADMIN — CEFTRIAXONE 2 G: 1 INJECTION, POWDER, FOR SOLUTION INTRAMUSCULAR; INTRAVENOUS at 08:06

## 2025-06-07 RX ADMIN — SIROLIMUS 1 MG: 1 TABLET ORAL at 08:06

## 2025-06-07 RX ADMIN — OXYCODONE HYDROCHLORIDE 30 MG: 10 TABLET ORAL at 06:06

## 2025-06-07 RX ADMIN — MUPIROCIN: 20 OINTMENT TOPICAL at 08:06

## 2025-06-07 RX ADMIN — ALBUMIN (HUMAN) 25 G: 12.5 SOLUTION INTRAVENOUS at 08:06

## 2025-06-07 RX ADMIN — MUPIROCIN: 20 OINTMENT TOPICAL at 09:06

## 2025-06-07 RX ADMIN — DIAZEPAM 5 MG: 5 TABLET ORAL at 09:06

## 2025-06-07 NOTE — PROGRESS NOTES
[facility] Medicine  Progress Note    Patient Name: Jhonny Diana  MRN: [id number]  Patient Class: IP- Inpatient   Admission Date: 6/6/2025  Length of Stay: 1 days  Attending Physician: Debo Fuentes MD  Primary Care Provider: Elo Bull MD        Subjective     Principal Problem:History of liver transplant        HPI:   Per  note:    Mr. 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 His post-transplant period was notable for LAURA previously on HD, anastomotic stricture requiring dilatation and stenting, and ETOH relapse (6/2019). He has followed with Dr. Julio in Hepatology, though his last clinic visit was 5/30/23. He is now followed by Dr. Morgan in Transplant Clinic. Per Dr. Michel at [facility], he is only on Sirolimus 1mg daily (of note, this was held a short time after his hernia repair).    He was recently admitted to [facility] in [place] on 6/2 with SOB, jaundice, and anasarca. Since admit, he has been taking his home Sirolimis.    He was ultimately found to have a large right pleural effusion. He had a right US guided thoracentesis on 6/3 with removal of 1000 cc of reddish-brown otherwise nonturbid fluid. Thora fluid studies: Gluc 91, , pH 8, Prot 3.5,  with 15% PMN, RBC 20,6000. He also had ascites, Abdominal U/S: Only trace fluid right upper quadrant insufficient for ultrasound-guided drainage.     He was also found to be in LAURA with Cr of 1.98, which then went to 2.02, and has now improved to 1.8 again with IV diuresis (Lasix + Metolazone). Nephrology there was consulted. At admit: WBC 7.1, Hg 10.6, PWS820, AST 46, ALT 28, TB 8.2, NH3 57, , INR 1.3. TB today has improved to 5.6.    Yesterday, unexpectedly his  blood cultures 2/2 turned positive for GPC. He was started on Vancomycin and Rocephin 2g iv daily. They do not have ID service there and have not done TTE or BRADEN. Given elevated TB and bacteremia, GI there recommended transfer to center with Hepatology to help manage him with history of XPL.    Current VS 6/4 at 4pm: T 98.0F, P 80, RR 80, /55.    CXR 6/2: Large right pleural effusion causing compressive atelectasis of most of the right lung. Left lung is clear.The trachea appears severely narrowed and the right mainstem bronchus is not well seen, raising the possibility of a mediastinal mass.     Overview/Hospital Course:  Patient transferred for further hepatology eval and persistent R sided pleural effusion. Hepatology recommending TJ biopsy with portal pressures, IR consulted.     Patient had CTH done erroneously. No clinical need.      Interval History: No acute events overnight. CT chest done this morning, read pending. Patient reports breathing is better than a few weeks ago.     Review of Systems  Objective:     Vital Signs (Most Recent):  Temp: 99 °F (37.2 °C) (06/07/25 1214)  Pulse: 92 (06/07/25 1214)  Resp: 18 (06/07/25 1214)  BP: (!) 122/58 (06/07/25 1214)  SpO2: 95 % (06/07/25 1214) Vital Signs (24h Range):  Temp:  [98.7 °F (37.1 °C)-99.6 °F (37.6 °C)] 99 °F (37.2 °C)  Pulse:  [] 92  Resp:  [18] 18  SpO2:  [93 %-98 %] 95 %  BP: (122-137)/(58-69) 122/58     Weight: 68.6 kg (151 lb 5.5 oz)  Body mass index is 24.43 kg/m².    Intake/Output Summary (Last 24 hours) at 6/7/2025 1231  Last data filed at 6/7/2025 0649  Gross per 24 hour   Intake 850 ml   Output --   Net 850 ml      Physical Exam  Vitals and nursing note reviewed.   Constitutional:       General: He is not in acute distress.     Appearance: He is ill-appearing.   HENT:      Head: Normocephalic.   Eyes:      General: Scleral icterus present.   Cardiovascular:      Rate and Rhythm: Regular rhythm. Tachycardia present.      Heart sounds:  Normal heart sounds. No murmur heard.  Pulmonary:      Breath sounds: Examination of the right-lower field reveals decreased breath sounds. Decreased breath sounds present. No wheezing, rhonchi or rales.   Abdominal:      General: There is no distension.      Palpations: Abdomen is soft.      Tenderness: There is abdominal tenderness.   Musculoskeletal:      Right lower le+ Pitting Edema present.      Left lower le+ Pitting Edema present.      Comments: To knees   Skin:     Coloration: Skin is jaundiced.   Neurological:      General: No focal deficit present.      Mental Status: He is alert and oriented to person, place, and time.   Psychiatric:         Mood and Affect: Mood normal.         Behavior: Behavior normal.         MELD 3.0: 26 at 2025  7:05 AM  MELD-Na: 26 at 2025  7:05 AM  Calculated from:  Serum Creatinine: 1.7 mg/dL at 2025  4:35 PM  Serum Sodium: 131 mmol/L at 2025  4:35 PM  Total Bilirubin: 7.1 mg/dL at 2025  4:35 PM  Serum Albumin: 2.2 g/dL at 2025  4:35 PM  INR(ratio): 1.3 at 2025  7:05 AM  Age at listing (hypothetical): 34 years  Sex: Male at 2025  7:05 AM      Significant Labs:  CBC:  Recent Labs   Lab 25  1635 25  0146   WBC 9.13 6.34   HGB 10.4* 8.5*   HCT 30.6* 25.5*   * 88*     CMP:  Recent Labs   Lab 25  1635   *   K 3.1*   CL 94*   CO2 24   GLU 93   BUN 28*   CREATININE 1.7*   CALCIUM 8.0*   PROT 6.8   ALBUMIN 2.2*   BILITOT 7.1*   ALKPHOS 179*   AST 62*   ALT 26   ANIONGAP 13     PTINR:  Recent Labs   Lab 25  1635 25  0705   INR 1.2 1.3*       Significant Procedures:   Dobutamine Stress Test with Color Flow: No results found. However, due to the size of the patient record, not all encounters were searched. Please check Results Review for a complete set of results.    None      Assessment & Plan  Pleural effusion    Presented with new onset SOB at OSH. Found to have Large right pleural effusion causing  compressive atelectasis of most of the right lung on 6/2 CXR. Underwent thora  Right US guided thoracentesis on 6/3 with removal of 1000 cc of reddish-brown otherwise nonturbid fluid. Thora fluid studies: Gluc 91, , pH 8, Prot 3.5,  with 15% PMN, RBC 20,6000.   Studies consistent with transudative effusion. Likely from ascites   - CT chest non con ordered, given CKD   - pulm consult for repeat thora deferred as patient symptomatically at baseline, pending CT chest results and/or clinical deterioration   - follow up cytology reported sent at Osh     Patient found to have large pleural effusion on imaging. I have personally reviewed and interpreted the following imaging: Xray. A thoracentesis was performed. Pleural fluid was sent for analysis. Labs reviewed, including see abve. Fluid most consistent with Transudate.  . Most likely etiology includes Cirrhosis. Management to include Diuresis    History of liver transplant  Hyperbilirubinemia  Immunosuppression  Liver tx in 2017 2/2 Etoh cirrhosis. Follows with Dr. Morgan. Hx of anastomotic stricture requiring dilatation and stenting. Had been having uptrending bilirubin and MRCP ordered outpatient, done at Osh as outpatient. Admitted to Osh with Tbili of 8, ascites, transudative pleural effusion.     - RUQ US done  - f/u MRCP results from OSH   - continue sirolimus 1mg BID  - hepatology consulted > TJ liver biopsy with portal pressures  - valaclovir PPx continued     Ascites  Lower extremity edema    Started on Lasix 80 IV daily, metolazone 5mg daily, spironolactone 100mg PO daily  at OSH with minor improvement in edema.   - continue Lasix, metolazone, spironolactone     Acute kidney injury superimposed on CKD  Mostly resolved at this point.    Hx of LAURA on CKD at OSH with cr rising to 2.5. Most recently before transfer was 1.69. baseline ~ 1.6-1.7. Started on Lasix 80 IV daily, metolazone 5mg daily, spironolactone 100mg PO daily  at OSH with minor  improvement in edema.     - urine studies ordered   - monitor UOP  - consult nephrology     LAURA is likely due to unclear. Baseline creatinine is 1.6-1.7. Most recent creatinine and eGFR are listed below.  Recent Labs     06/06/25  1635 06/07/25  0146   CREATININE 1.7* 1.5*   EGFRNORACEVR 53* >60      Plan  - LAURA is stable  - Avoid nephrotoxins and renally dose meds for GFR listed above  - Monitor urine output, serial BMP, and adjust therapy as needed  Bacteremia  2/2 blood cultures positive for GPC at OSH. Microbiology results not in transfer packet. No TTE or BRADEN done.   - started on vanc/cftx at OSH on 6/2, will continue for now   - f/up speciation and sensitivities from OSH  - repeat Bd cx done 6/6, negative thus far    - TTE ordered   - ID consulted     Opioid dependence  Chronic pain  Follows with pain hospitalist. Takes 30mg oxycodone QID   - continue oxycodone 30mg q6h PRN to avoid withdrawals while inpatient     Hypokalemia  Patient's most recent potassium results are listed below.   Recent Labs     06/06/25  1635 06/07/25  0146   K 3.1* 3.4*     Plan  - Replete potassium per protocol  - Monitor potassium Daily  - Patient's hypokalemia is stable  Hyponatremia  Hyponatremia is likely due to Cirrhosis. The patient's most recent sodium results are listed below.  Recent Labs     06/06/25  1635 06/07/25  0146   * 131*     Plan  - Monitor sodium Daily.   - Patient hyponatremia is stable  VTE Risk Mitigation (From admission, onward)           Ordered     IP VTE LOW RISK PATIENT  Once         06/06/25 1604     Place sequential compression device  Until discontinued         06/06/25 1604                    Discharge Planning   DERICK: 6/10/2025     Code Status: Full Code   Medical Readiness for Discharge Date:                            Debo Fuentes MD  Department of Hospital Medicine   [facility] - [facility]

## 2025-06-07 NOTE — ASSESSMENT & PLAN NOTE
Renal function currently at baseline.   -Avoid nephrotoxic agents (IV contrast, NSAID's, gadolinium contrast, PPI's) if able.   -Maintain MAP above 65 mmHg.   -Strict I's and O's  -Daily renal function panel  -Renally dose all medications'

## 2025-06-07 NOTE — SUBJECTIVE & OBJECTIVE
Interval History: No acute events overnight. CT chest done this morning, read pending. Patient reports breathing is better than a few weeks ago.     Review of Systems  Objective:     Vital Signs (Most Recent):  Temp: 99 °F (37.2 °C) (25 1214)  Pulse: 92 (25 1214)  Resp: 18 (25 1214)  BP: (!) 122/58 (25 1214)  SpO2: 95 % (25 1214) Vital Signs (24h Range):  Temp:  [98.7 °F (37.1 °C)-99.6 °F (37.6 °C)] 99 °F (37.2 °C)  Pulse:  [] 92  Resp:  [18] 18  SpO2:  [93 %-98 %] 95 %  BP: (122-137)/(58-69) 122/58     Weight: 68.6 kg (151 lb 5.5 oz)  Body mass index is 24.43 kg/m².    Intake/Output Summary (Last 24 hours) at 2025 1231  Last data filed at 2025 0649  Gross per 24 hour   Intake 850 ml   Output --   Net 850 ml      Physical Exam  Vitals and nursing note reviewed.   Constitutional:       General: He is not in acute distress.     Appearance: He is ill-appearing.   HENT:      Head: Normocephalic.   Eyes:      General: Scleral icterus present.   Cardiovascular:      Rate and Rhythm: Regular rhythm. Tachycardia present.      Heart sounds: Normal heart sounds. No murmur heard.  Pulmonary:      Breath sounds: Examination of the right-lower field reveals decreased breath sounds. Decreased breath sounds present. No wheezing, rhonchi or rales.   Abdominal:      General: There is no distension.      Palpations: Abdomen is soft.      Tenderness: There is abdominal tenderness.   Musculoskeletal:      Right lower le+ Pitting Edema present.      Left lower le+ Pitting Edema present.      Comments: To knees   Skin:     Coloration: Skin is jaundiced.   Neurological:      General: No focal deficit present.      Mental Status: He is alert and oriented to person, place, and time.   Psychiatric:         Mood and Affect: Mood normal.         Behavior: Behavior normal.         MELD 3.0: 26 at 2025  7:05 AM  MELD-Na: 26 at 2025  7:05 AM  Calculated from:  Serum Creatinine: 1.7 mg/dL  at 6/6/2025  4:35 PM  Serum Sodium: 131 mmol/L at 6/6/2025  4:35 PM  Total Bilirubin: 7.1 mg/dL at 6/6/2025  4:35 PM  Serum Albumin: 2.2 g/dL at 6/6/2025  4:35 PM  INR(ratio): 1.3 at 6/7/2025  7:05 AM  Age at listing (hypothetical): 34 years  Sex: Male at 6/7/2025  7:05 AM      Significant Labs:  CBC:  Recent Labs   Lab 06/06/25  1635 06/07/25  0146   WBC 9.13 6.34   HGB 10.4* 8.5*   HCT 30.6* 25.5*   * 88*     CMP:  Recent Labs   Lab 06/06/25  1635   *   K 3.1*   CL 94*   CO2 24   GLU 93   BUN 28*   CREATININE 1.7*   CALCIUM 8.0*   PROT 6.8   ALBUMIN 2.2*   BILITOT 7.1*   ALKPHOS 179*   AST 62*   ALT 26   ANIONGAP 13     PTINR:  Recent Labs   Lab 06/06/25  1635 06/07/25  0705   INR 1.2 1.3*       Significant Procedures:   Dobutamine Stress Test with Color Flow: No results found. However, due to the size of the patient record, not all encounters were searched. Please check Results Review for a complete set of results.    None

## 2025-06-07 NOTE — CONSULTS
[facility]  Infectious Disease  Consult Note    Patient Name: Jhonny Diana  MRN: [id number]  Admission Date: 6/6/2025  Hospital Length of Stay: 1 days  Attending Physician: Debo Fuentes MD  Primary Care Provider: Elo Bull MD     Isolation Status: No active isolations    Patient information was obtained from patient and past medical records.      Inpatient consult to Infectious Diseases  Consult performed by: Marcela Longoria MD  Consult ordered by: Debo Fuentes MD        Assessment/Plan:     ID  Bacteremia  34M with history of EtOH cirrhosis s/p liver transplant 2017, on sirolimus, umbilical hernia repair with mesh placement 4/2025, presented to OSH 6/2/2025 with SOB and jaundice, found to have LAURA, large right pleural effusion s/p thoracentesis 6/3/2025 ( with 15% PMN, RBC 20K), hyperbilirubinemia, plans for liver biopsy. ID consulted for Staph hominis in 1/2 sets of blood cultures, likely contaminant.    Recommendation:  - Discontinue vancomycin IV as Staph hominis bacteremia in 1 set of blood cultures is likely contaminant  - Follow-up repeat blood cultures performed on admission  - CMV ordered for AM          55 minutes of total time spent on the encounter, which includes face to face time and non-face to face time preparing to see the patient (eg, review of tests), obtaining and reviewing separately obtained history, documenting clinical information in the electronic or other health record, independently interpreting results (not separately reported) and communicating results to the patient/family/caregiver, and care coordination (not separately reported).         Thank you for your consult. I will sign off. Please contact us if you have any additional questions.    Marcela Longoria MD  Infectious Disease  [facility]    Subjective:     Principal Problem: History of liver transplant    HPI: 34M with history of EtOH cirrhosis s/p liver  transplant 2017, on sirolimus, umbilical hernia repair with mesh placement 4/2025, presented to OSH 6/2/2025 with SOB and jaundice. He was found to have large right pleural effusion, underwent thoracentesis 6/3/2025 with removal of 1L fluid -  with 15% PMN, RBC 20K. Noted to have ascites, but not enough for paracentesis. Blood cultures 1/2 with Staph hominis, started on vancomycin IV and ceftriaxone IV.    Past Medical History:   Diagnosis Date    Abdominal pain 05/27/2018    Alcoholic cirrhosis 08/14/2018    Alcoholic hepatitis with ascites     Anemia     CKD (chronic kidney disease) stage 3, GFR 30-59 ml/min 08/14/2018    Encounter for blood transfusion     multiple blood transfusions    ESRD on dialysis     Herpes zoster without complication 12/11/2019    History of hematemesis 09/28/2017    Hypertension     Seizures     Substance abuse     alcohol and opioids    Thrombocytopenia     Transplanted liver     10/2017       Past Surgical History:   Procedure Laterality Date    APPENDECTOMY      CENTRAL VENOUS CATHETER TUNNELED INSERTION DOUBLE LUMEN      inserted and removed    ERCP      ERCP N/A 7/2/2018    Procedure: ERCP;  Surgeon: Solis Villanueva MD;  Location: [facility] ([address] [place] [address]);  Service: Endoscopy;  Laterality: N/A;  dialysis/cirrhosis/labs prior to ERCP/svn  platelet    ESOPHAGOGASTRODUODENOSCOPY      ESOPHAGOGASTRODUODENOSCOPY N/A 11/25/2019    Procedure: EGD (ESOPHAGOGASTRODUODENOSCOPY);  Surgeon: Sheri Siddiqui MD;  Location: [facility] (2ND FLR);  Service: Endoscopy;  Laterality: N/A;    LIVER TRANSPLANT      10/2017    REPAIR, HERNIA, INGUINAL, WITHOUT HISTORY OF PRIOR REPAIR, AGE 5 YEARS OR OLDER Left 11/26/2024    Procedure: REPAIR, HERNIA, INGUINAL, WITHOUT HISTORY OF PRIOR REPAIR, AGE 5 YEARS OR OLDER;  Surgeon: Elian Bazan MD;  Location: [facility] OR [address] [place] [address];  Service: General;  Laterality: Left;       Review of patient's allergies indicates:   Allergen Reactions    Bactrim  "[sulfamethoxazole-trimethoprim] Other (See Comments)     Mookie Trell Syndrome       Medications:  Medications Prior to Admission   Medication Sig    diazePAM (VALIUM) 5 MG tablet Take 5 mg by mouth Daily.    mycophenolate sodium 360 MG TbEC Take 360 mg by mouth 2 (two) times daily.    oxyCODONE (ROXICODONE) 30 MG Tab Take 30 mg by mouth every 6 (six) hours as needed (pain).    sirolimus (RAPAMUNE) 1 MG Tab Take 1 tablet (1 mg total) by mouth once daily.    tiZANidine 4 mg Cap Take 1 tablet by mouth every evening.    valACYclovir (VALTREX) 500 MG tablet Take 500 mg by mouth once daily.    naloxone (NARCAN) 4 mg/actuation Spry CALL 911. SPR CONTENTS OF ONE SPRAYER (0.1ML) INTO ONE NOSTRIL. REPEAT IN 2-3 MIN IF SYMPTOMS OF OPIOID EMERGENCY PERSIST, ALTERNATE NOSTRILS     Antibiotics (From admission, onward)      Start     Stop Route Frequency Ordered    06/06/25 2100  mupirocin 2 % ointment         06/11/25 2059 Nasl 2 times daily 06/06/25 1759    06/06/25 1845  cefTRIAXone injection 2 g         -- IV Every 24 hours (non-standard times) 06/06/25 1737    06/06/25 1834  vancomycin - pharmacy to dose  (vancomycin IVPB (PEDS and ADULTS))        Placed in "And" Linked Group    -- IV pharmacy to manage frequency 06/06/25 1737          Antifungals (From admission, onward)      None          Antivirals (From admission, onward)      None             Immunization History   Administered Date(s) Administered    COVID-19, MRNA, LN-S, PF (Pfizer) (Purple Cap) 03/03/2021, 03/29/2021, 12/12/2021    Hepatitis A, Adult 08/27/2018    Hepatitis A, Pediatric/Adolescent, 2 Dose 10/11/2017    Hepatitis B, Adult 10/12/2017    Influenza - Trivalent - Fluzone High Dose - PF (65 years and older) 10/12/2017    Pneumococcal Conjugate - 13 Valent 10/12/2017    Pneumococcal Polysaccharide - 23 Valent 08/27/2018    Tdap 10/11/2017       Family History       Problem Relation (Age of Onset)    Alcohol abuse Father    Cancer Father    No Known " Problems Mother, Sister, Brother          Social History     Socioeconomic History    Marital status: Single   Occupational History     Comment: full time    Tobacco Use    Smoking status: Former     Current packs/day: 0.00     Average packs/day: 1 pack/day for 10.0 years (10.0 ttl pk-yrs)     Types: Cigarettes     Start date: 2007     Quit date: 2017     Years since quittin.3     Passive exposure: Current    Smokeless tobacco: Never    Tobacco comments:     Medical Marijuana smoker.    Substance and Sexual Activity    Alcohol use: No     Comment: a fifth of liquor daily for years, cut back over last 2 months    Drug use: No    Sexual activity: Not Currently     Partners: Male     Comment: single   Social History Narrative    He lives in [facility] [place] with his roommate      Social Drivers of Health     Financial Resource Strain: Medium Risk (2025)    Overall Financial Resource Strain (CARDIA)     Difficulty of Paying Living Expenses: Somewhat hard   Food Insecurity: Food Insecurity Present (2025)    Hunger Vital Sign     Worried About Running Out of Food in the Last Year: Sometimes true     Ran Out of Food in the Last Year: Sometimes true   Transportation Needs: Unmet Transportation Needs (2025)    PRAPARE - Transportation     Lack of Transportation (Medical): No     Lack of Transportation (Non-Medical): Yes   Physical Activity: Sufficiently Active (2024)    Exercise Vital Sign     Days of Exercise per Week: 5 days     Minutes of Exercise per Session: 30 min   Stress: Stress Concern Present (2025)    [employer] - Occupational Stress Questionnaire     Feeling of Stress : To some extent   Housing Stability: Low Risk  (2025)    Housing Stability Vital Sign     Unable to Pay for Housing in the Last Year: No     Homeless in the Last Year: No     Review of Systems   Constitutional:  Negative for chills, diaphoresis and fever.   HENT:  Negative for  rhinorrhea and sore throat.    Respiratory:  Positive for shortness of breath. Negative for cough.    Cardiovascular:  Positive for leg swelling. Negative for chest pain.   Gastrointestinal:  Negative for abdominal pain, diarrhea, nausea and vomiting.   Genitourinary:  Negative for dysuria and hematuria.   Musculoskeletal:  Negative for arthralgias and myalgias.   Skin:  Negative for rash.   Neurological:  Negative for headaches.     Objective:     Vital Signs (Most Recent):  Temp: 99.3 °F (37.4 °C) (06/07/25 0748)  Pulse: 101 (06/07/25 0748)  Resp: 18 (06/07/25 0748)  BP: 131/60 (06/07/25 0748)  SpO2: (!) 93 % (06/07/25 0748) Vital Signs (24h Range):  Temp:  [98.7 °F (37.1 °C)-99.6 °F (37.6 °C)] 99.3 °F (37.4 °C)  Pulse:  [] 101  Resp:  [18] 18  SpO2:  [93 %-98 %] 93 %  BP: (122-137)/(58-69) 131/60     Weight: 68.6 kg (151 lb 5.5 oz)  Body mass index is 24.43 kg/m².    Estimated Creatinine Clearance: 54.7 mL/min (A) (based on SCr of 1.7 mg/dL (H)).     Physical Exam  Vitals reviewed.   Constitutional:       General: He is not in acute distress.     Appearance: He is well-developed. He is not diaphoretic.   HENT:      Head: Normocephalic and atraumatic.      Nose: Nose normal.   Eyes:      Conjunctiva/sclera: Conjunctivae normal.   Pulmonary:      Effort: Pulmonary effort is normal. No respiratory distress.   Abdominal:      General: Abdomen is flat. There is no distension.   Musculoskeletal:      Cervical back: Normal range of motion.      Right lower leg: No edema.      Left lower leg: No edema.   Skin:     General: Skin is warm and dry.      Findings: No erythema or rash.   Neurological:      Mental Status: He is alert.   Psychiatric:         Behavior: Behavior normal.          Significant Labs: All pertinent labs within the past 24 hours have been reviewed.    Significant Imaging: I have reviewed all pertinent imaging results/findings within the past 24 hours.

## 2025-06-07 NOTE — CONSULTS
[facility]  Nephrology  Consult Note    Patient Name: Jhonny Diana  MRN: [id number]  Admission Date: 6/6/2025  Hospital Length of Stay: 1 days  Attending Provider: Debo Fuentes MD   Primary Care Physician: Elo Bull MD  Principal Problem:History of liver transplant    Inpatient consult to Nephrology  Consult performed by: Darin Zaldivar MD  Consult ordered by: Nilda Cruz MD  Reason for consult: Edema        Subjective:     HPI: Mr. 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 His post-transplant period was notable for LAURA previously on HD, anastomotic stricture requiring dilatation and stenting, and ETOH relapse (6/2019).     He was recently admitted to [facility] in [place] on 6/2 with SOB, jaundice, and anasarca. He was ultimately found to have a large right pleural effusion. He had a right US guided thoracentesis on 6/3 with removal of 1000 cc of reddish-brown otherwise nonturbid fluid. Thora fluid studies: Gluc 91, , pH 8, Prot 3.5,  with 15% PMN, RBC 20,6000.    Nephrology consulted for elevated serum creatinine and edema. Review of his outpatient labs show a baseline serum creatinine of 1.5-1.7 range, currently at 1.5 at the time of this consult. Patient reports noticing increasing lower extremity edema starting on 5/16, and has progressed so severely that he cannot put on compression stockings or shoes. He does have a low albumin level of 1.8 on serum chemistry. Since the May 16th he has noticed that it has been increasingly more difficult to pass urine and thinks that he has been producing less urine. Due to his increased dyspnea and pleural effusion, he was not eating or drinking the the week leading up to admission.     Blood cultures 2/2 turned positive  for GPC. He was started on Vancomycin and Rocephin 2g iv daily. Current VS 6/4 at 4pm: T 98.0F, P 80, RR 80, /55.    Past Medical History:   Diagnosis Date    Abdominal pain 05/27/2018    Alcoholic cirrhosis 08/14/2018    Alcoholic hepatitis with ascites     Anemia     CKD (chronic kidney disease) stage 3, GFR 30-59 ml/min 08/14/2018    Encounter for blood transfusion     multiple blood transfusions    ESRD on dialysis     Herpes zoster without complication 12/11/2019    History of hematemesis 09/28/2017    Hypertension     Seizures     Substance abuse     alcohol and opioids    Thrombocytopenia     Transplanted liver     10/2017       Past Surgical History:   Procedure Laterality Date    APPENDECTOMY      CENTRAL VENOUS CATHETER TUNNELED INSERTION DOUBLE LUMEN      inserted and removed    ERCP      ERCP N/A 7/2/2018    Procedure: ERCP;  Surgeon: Solis Villanueva MD;  Location: [facility] ([address] [place] [address]);  Service: Endoscopy;  Laterality: N/A;  dialysis/cirrhosis/labs prior to ERCP/svn  platelet    ESOPHAGOGASTRODUODENOSCOPY      ESOPHAGOGASTRODUODENOSCOPY N/A 11/25/2019    Procedure: EGD (ESOPHAGOGASTRODUODENOSCOPY);  Surgeon: Sheri Siddiqui MD;  Location: [facility] ([address] [place] [address]);  Service: Endoscopy;  Laterality: N/A;    LIVER TRANSPLANT      10/2017    REPAIR, HERNIA, INGUINAL, WITHOUT HISTORY OF PRIOR REPAIR, AGE 5 YEARS OR OLDER Left 11/26/2024    Procedure: REPAIR, HERNIA, INGUINAL, WITHOUT HISTORY OF PRIOR REPAIR, AGE 5 YEARS OR OLDER;  Surgeon: Elian Bazan MD;  Location: [facility] OR [address] [place] [address];  Service: General;  Laterality: Left;       Review of patient's allergies indicates:   Allergen Reactions    Bactrim [sulfamethoxazole-trimethoprim] Other (See Comments)     Mookie Trell Syndrome     Current Facility-Administered Medications   Medication Frequency    acetaminophen tablet 650 mg Q8H PRN    albuterol-ipratropium 2.5 mg-0.5 mg/3 mL nebulizer solution 3 mL Q4H PRN    aluminum-magnesium hydroxide-simethicone  200-200-20 mg/5 mL suspension 30 mL QID PRN    bisacodyL suppository 10 mg Daily PRN    cefTRIAXone injection 2 g Q24H    dextrose 50% injection 12.5 g PRN    dextrose 50% injection 25 g PRN    glucagon (human recombinant) injection 1 mg PRN    glucose chewable tablet 16 g PRN    glucose chewable tablet 24 g PRN    melatonin tablet 6 mg Nightly PRN    mupirocin 2 % ointment BID    naloxone 0.4 mg/mL injection 0.02 mg PRN    ondansetron injection 4 mg Q8H PRN    oxyCODONE immediate release tablet Tab 30 mg Q6H PRN    prochlorperazine injection Soln 5 mg Q6H PRN    senna-docusate 8.6-50 mg per tablet 1 tablet BID PRN    sirolimus tablet 1 mg Daily     Family History       Problem Relation (Age of Onset)    Alcohol abuse Father    Cancer Father    No Known Problems Mother, Sister, Brother          Tobacco Use    Smoking status: Former     Current packs/day: 0.00     Average packs/day: 1 pack/day for 10.0 years (10.0 ttl pk-yrs)     Types: Cigarettes     Start date: 2007     Quit date: 2017     Years since quittin.3     Passive exposure: Current    Smokeless tobacco: Never    Tobacco comments:     Medical Marijuana smoker.    Substance and Sexual Activity    Alcohol use: No     Comment: a fifth of liquor daily for years, cut back over last 2 months    Drug use: No    Sexual activity: Not Currently     Partners: Male     Comment: single     Review of Systems   Constitutional:  Negative for activity change, chills, diaphoresis and unexpected weight change.   HENT:  Negative for congestion.    Eyes:  Negative for visual disturbance.   Respiratory:  Positive for shortness of breath. Negative for apnea, cough and wheezing.    Cardiovascular:  Positive for leg swelling. Negative for chest pain and palpitations.   Gastrointestinal:  Positive for abdominal distention. Negative for constipation, diarrhea, nausea and vomiting.   Endocrine: Negative for polyuria.   Genitourinary:  Positive for decreased urine volume  and difficulty urinating. Negative for flank pain, frequency and hematuria.   Musculoskeletal:  Negative for back pain and joint swelling.     Objective:     Vital Signs (Most Recent):  Temp: 99 °F (37.2 °C) (06/07/25 1214)  Pulse: 90 (06/07/25 1440)  Resp: 18 (06/07/25 1233)  BP: (!) 122/58 (06/07/25 1214)  SpO2: 95 % (06/07/25 1214) Vital Signs (24h Range):  Temp:  [98.7 °F (37.1 °C)-99.6 °F (37.6 °C)] 99 °F (37.2 °C)  Pulse:  [] 90  Resp:  [18] 18  SpO2:  [93 %-98 %] 95 %  BP: (122-137)/(58-69) 122/58     Weight: 68.6 kg (151 lb 5.5 oz) (06/06/25 1630)  Body mass index is 24.43 kg/m².  Body surface area is 1.79 meters squared.    I/O last 3 completed shifts:  In: 850 [P.O.:600; IV Piggyback:250]  Out: -      Physical Exam  Vitals and nursing note reviewed.   Constitutional:       General: He is not in acute distress.     Appearance: Normal appearance. He is not ill-appearing or toxic-appearing.   HENT:      Head: Normocephalic and atraumatic.      Right Ear: External ear normal.      Left Ear: External ear normal.      Nose: Nose normal.      Mouth/Throat:      Mouth: Mucous membranes are moist.   Eyes:      Extraocular Movements: Extraocular movements intact.   Cardiovascular:      Rate and Rhythm: Normal rate and regular rhythm.      Pulses: Normal pulses.      Heart sounds: Normal heart sounds.   Pulmonary:      Effort: Pulmonary effort is normal.      Breath sounds: Examination of the right-upper field reveals decreased breath sounds. Examination of the right-middle field reveals decreased breath sounds. Examination of the right-lower field reveals decreased breath sounds. Decreased breath sounds present.   Abdominal:      General: There is distension.      Palpations: Abdomen is soft.   Musculoskeletal:      Cervical back: Normal range of motion and neck supple.      Right lower leg: Edema (4+ edema, shiny appearance) present.      Left lower leg: Edema (4+ edema, shiny appearance) present.   Skin:      General: Skin is warm.      Capillary Refill: Capillary refill takes less than 2 seconds.   Neurological:      General: No focal deficit present.      Mental Status: He is oriented to person, place, and time.          Significant Labs:  CBC:   Recent Labs   Lab 06/07/25  0146   WBC 6.34   RBC 2.58*   HGB 8.5*   HCT 25.5*   PLT 88*   MCV 99*   MCH 32.9*   MCHC 33.3     CMP:   Recent Labs   Lab 06/07/25  0146   *   CALCIUM 7.6*   ALBUMIN 1.8*   PROT 5.7*   *   K 3.4*   CO2 24   CL 99   BUN 27*   CREATININE 1.5*   ALKPHOS 124   ALT 24   AST 55*   BILITOT 5.8*     All labs within the past 24 hours have been reviewed.    Significant Imaging:  CT: Reviewed    Assessment/Plan:     Pulmonary  Pleural effusion  Management per primary team.      Renal/  CKD stage 3a, GFR 45-59 ml/min  Renal function currently at baseline.   -Avoid nephrotoxic agents (IV contrast, NSAID's, gadolinium contrast, PPI's) if able.   -Maintain MAP above 65 mmHg.   -Strict I's and O's  -Daily renal function panel  -Renally dose all medications'    Hypokalemia  Replace as needed.     ID  Bacteremia  Antibiotics (From admission, onward)      Start     Stop Route Frequency Ordered    06/06/25 2100  mupirocin 2 % ointment         06/11/25 2059 Nasl 2 times daily 06/06/25 1759    06/06/25 1845  cefTRIAXone injection 2 g         -- IV Every 24 hours (non-standard times) 06/06/25 1737          Management per primary team.      Immunology/Multi System  Immunosuppression  Management per primary team.      Endocrine  Hyponatremia  Suspect due to his underlying liver disease.  Monitor levels daily.     GI  * History of liver transplant  Management per primary team.      Other  Lower extremity edema  Patient presents with significant lower extremity edema. Appearance of his edema is shiny in appearance and patient with low serum albumin level. I suspect that his edema is likely a result of his hypoalbuminemia and liver  disease.    Recommendations  -Recommend to give IV albumin 25 g in 25%, followed immediately by IV lasix to assist with mobilizing his edema and diuresis.         Thank you for your consult. I will follow-up with patient. Please contact us if you have any additional questions.    Darin Zaldivar MD  Nephrology  [facility]

## 2025-06-07 NOTE — ASSESSMENT & PLAN NOTE
Antibiotics (From admission, onward)      Start     Stop Route Frequency Ordered    06/06/25 2100  mupirocin 2 % ointment         06/11/25 2059 Nasl 2 times daily 06/06/25 1759    06/06/25 1845  cefTRIAXone injection 2 g         -- IV Every 24 hours (non-standard times) 06/06/25 1737          Management per primary team.

## 2025-06-07 NOTE — HPI
PRE-TRANSPLANT INFECTIOUS DISEASE CONSULT    Reason for Visit:  Pre-transplant evaluation  Referring Provider: Provider [facility]     History of Present Illness:    34 y.o. male with a history of EtOH cirrhosis s/p liver transplant 2017, on sirolimus, umbilical hernia repair with mesh placement 4/2025, presented to OSH 6/2/2025 with SOB and jaundice. He was found to have large right pleural effusion, underwent thoracentesis 6/3/2025 with removal of 1L fluid -  with 15% PMN, RBC 20K. Noted to have ascites, but not enough for paracentesis. Blood cultures 1/2 with Staph hominis likely contaminant. ID now consulted for  pre-liver transplant evaluation.    Infectious History:  Recent hospital admissions: Yes  Recent infections: Yes  Recent or current antibiotic use: Yes  History of recurrent infections *(sinus / pneumonia / UTI / SBP)*: No  History of diabetic foot wound, bone/joint infection: No  Recent dental infections, issues or procedures: No. Endorses filling that recently fell out.   History of chicken pox: Yes  History of shingles: No  History of STI: No    History of Immunosuppression:  Prior chemotherapy / immunosuppression: Yes  Prior transplant: Yes  History of splenectomy: No    Tuberculosis:  Prior screening for latent TB: Yes  Prior diagnosis of latent TB: No  Risk factors for TB *known exposure, incarceration, homelessness*: No. Endorses was briefly exposed to someone years ago undergoing TB treatment at the time.    Geographical exposures:  Currently lives in [place], [place] with his mother  Lived in the following states: [place], [place], [place], [place], [place]  Lived in or travelled to [facility] [place]: No  International travel: No  Travel-associated illness: No    Social/Environmental:  Occupation:  Not currently employed.  Pets: Yes; adult cat   Livestock: No  Fishing / hunting: No  Hobbies: music, reading   Water: [place] water  Consumption of raw/undercooked meat or seafood?  No  Tobacco: No  Alcohol:  No  Recreational drug use:  Yes (medical marijuana)  Sexual partners: denies recent partners.       Past Histories:   Past Medical History:   Diagnosis Date    Abdominal pain 05/27/2018    Alcoholic cirrhosis 08/14/2018    Alcoholic hepatitis with ascites     Anemia     CKD (chronic kidney disease) stage 3, GFR 30-59 ml/min 08/14/2018    Encounter for blood transfusion     multiple blood transfusions    ESRD on dialysis     Herpes zoster without complication 12/11/2019    History of hematemesis 09/28/2017    Hypertension     Seizures     Substance abuse     alcohol and opioids    Thrombocytopenia     Transplanted liver     10/2017     Past Surgical History:   Procedure Laterality Date    APPENDECTOMY      CENTRAL VENOUS CATHETER TUNNELED INSERTION DOUBLE LUMEN      inserted and removed    ERCP      ERCP N/A 7/2/2018    Procedure: ERCP;  Surgeon: Solis Villanueva MD;  Location: [facility] ([address] [place] [address]);  Service: Endoscopy;  Laterality: N/A;  dialysis/cirrhosis/labs prior to ERCP/svn  platelet    ESOPHAGOGASTRODUODENOSCOPY      ESOPHAGOGASTRODUODENOSCOPY N/A 11/25/2019    Procedure: EGD (ESOPHAGOGASTRODUODENOSCOPY);  Surgeon: Sheri Siddiqui MD;  Location: [facility] ([address] [place] [address]);  Service: Endoscopy;  Laterality: N/A;    LIVER TRANSPLANT      10/2017    REPAIR, HERNIA, INGUINAL, WITHOUT HISTORY OF PRIOR REPAIR, AGE 5 YEARS OR OLDER Left 11/26/2024    Procedure: REPAIR, HERNIA, INGUINAL, WITHOUT HISTORY OF PRIOR REPAIR, AGE 5 YEARS OR OLDER;  Surgeon: Elian Bazan MD;  Location: [facility] OR [address] [place] [address];  Service: General;  Laterality: Left;     Family History   Problem Relation Name Age of Onset    No Known Problems Mother      Cancer Father          Lung cancer    Alcohol abuse Father      No Known Problems Sister 3     No Known Problems Brother 1     Kidney disease Neg Hx       Social History     Tobacco Use    Smoking status: Former     Current packs/day: 0.00     Average packs/day: 1 pack/day for 10.0 years (10.0 ttl  pk-yrs)     Types: Cigarettes     Start date: 2007     Quit date: 2017     Years since quittin.3     Passive exposure: Current    Smokeless tobacco: Never    Tobacco comments:     Medical Marijuana smoker.    Substance Use Topics    Alcohol use: No     Comment: a fifth of liquor daily for years, cut back over last 2 months    Drug use: No     Review of patient's allergies indicates:   Allergen Reactions    Bactrim [sulfamethoxazole-trimethoprim] Other (See Comments)     Mookie Trell Syndrome         Immunization History:  Received all childhood vaccines: Yes  All household members receive annual flu vaccine: Yes  All household members are up to date on COVID vaccine: Yes      Labs:    CBC:   Lab Results   Component Value Date    WBC 4.84 2025    HGB 7.9 (L) 2025    HCT 24.6 (L) 2025     (H) 2025    PLT 97 (L) 2025    GRAN 3.4 2024    GRAN 63.1 2024    LYMPH 30.4 2025    LYMPH 1.47 2025    MONO 12.6 2025    MONO 0.61 2025    EOSINOPHIL 3.7 2024       Syphilis screening:   Lab Results   Component Value Date    RPR Non-reactive 2018    TREPABIGMIGG Non-Reactive 2025        TB screening:   Lab Results   Component Value Date    TBGOLDPLUS Negative 2018    TSPOTSCREN See result image under hyperlink 10/11/2017       HIV screening:   Lab Results   Component Value Date    YSD56CNIN Non-Reactive 2025       Strongyloides IgG:   Lab Results   Component Value Date    STRONGANTIGG Negative 2018       Hepatitis Serologies:   Lab Results   Component Value Date    HEPAIGG Non-Reactive 2025    HEPBCAB Reactive (A) 2025    HEPBSAB Non-Reactive 2025    HEPCAB Non-Reactive 2025        Varicella IgG:   Lab Results   Component Value Date    VARICELLAINT Positive (A) 2018         Immunization History   Administered Date(s) Administered    COVID-19, MRNA, LN-S, PF (Pfizer) (Purple Cap)  03/03/2021, 03/29/2021, 12/12/2021    DTP 1989, 1989, 02/14/1990, 05/03/1991, 05/05/1994    Hepatitis A, Adult 08/27/2018    Hepatitis A, Pediatric/Adolescent, 2 Dose 10/11/2017    Hepatitis B, Adult 10/12/2017    Hepatitis B, Pediatric/Adolescent 11/24/1998, 02/15/1999, 07/07/1999    Hib-HbOC 02/23/1993    Influenza - Trivalent - Fluzone High Dose - PF (65 years and older) 10/12/2017    MMR 05/03/1991, 05/05/1994    OPV 1989, 1989, 05/03/1991, 05/05/1994    Pneumococcal Conjugate - 13 Valent 10/12/2017    Pneumococcal Conjugate - 20 Valent 10/11/2023    Pneumococcal Polysaccharide - 23 Valent 08/27/2018    Tdap 10/11/2017

## 2025-06-07 NOTE — ASSESSMENT & PLAN NOTE
2/2 blood cultures positive for GPC at OSH. Microbiology results not in transfer packet. No TTE or BRADEN done.   - started on vanc/cftx at OSH on 6/2, will continue for now   - f/up speciation and sensitivities from OSH  - repeat Bd cx done 6/6, negative thus far    - TTE ordered   - ID consulted

## 2025-06-07 NOTE — ASSESSMENT & PLAN NOTE
Patient presents with significant lower extremity edema. Appearance of his edema is shiny in appearance and patient with low serum albumin level. I suspect that his edema is likely a result of his hypoalbuminemia and liver disease.    Recommendations  -Recommend to give IV albumin 25 g in 25%, followed immediately by IV lasix to assist with mobilizing his edema and diuresis.

## 2025-06-07 NOTE — ASSESSMENT & PLAN NOTE
Hyponatremia is likely due to Cirrhosis. The patient's most recent sodium results are listed below.  Recent Labs     06/06/25  1635 06/07/25  0146   * 131*     Plan  - Monitor sodium Daily.   - Patient hyponatremia is stable

## 2025-06-07 NOTE — ASSESSMENT & PLAN NOTE
Mostly resolved at this point.    Hx of LAURA on CKD at OSH with cr rising to 2.5. Most recently before transfer was 1.69. baseline ~ 1.6-1.7. Started on Lasix 80 IV daily, metolazone 5mg daily, spironolactone 100mg PO daily  at OSH with minor improvement in edema.     - urine studies ordered   - monitor UOP  - consult nephrology     LAURA is likely due to unclear. Baseline creatinine is 1.6-1.7. Most recent creatinine and eGFR are listed below.  Recent Labs     06/06/25  1635 06/07/25  0146   CREATININE 1.7* 1.5*   EGFRNORACEVR 53* >60      Plan  - LAURA is stable  - Avoid nephrotoxins and renally dose meds for GFR listed above  - Monitor urine output, serial BMP, and adjust therapy as needed

## 2025-06-07 NOTE — HOSPITAL COURSE
Mr. Diana is a 34-year-old gentleman with history of liver transplant in 2017 following alcoholic hepatitis.  Transplant complicated by anastomotic stricture that required stent as well as treatment of acute rejection in 2024 requiring steroids.  He is admitted with decompensated cirrhosis involving volume overload.  Decompensated cirrhosis seems to be related to new hepatitis-B infection.  Found to be positive for hepatitis-B core antibody and surface antigen.  Negative for surface antibody, indicating insufficient immunity.  PCR and HBV e antigen/antibody pending.  Patient believes he may have been infected by a previous sexual partner.  Patient had large right-sided pleural effusion here of uncertain etiology.  Treated with thoracentesis on 06/03 with clinical improvement.  Continues to have partly loculated right pleural fluid collection on chest x-ray 6/13.  Patient treated for sepsis with vancomycin and Zosyn, but antibiotics discontinued on 06/11 following negative cultures.  6/8 liver biopsy pathology shows significant fibrosis without active steatohepatitis.   Hepatitis-B serologies were discovered on 06/14 as part of evaluation for re-transplantation.  Patient started on tenofovir.

## 2025-06-07 NOTE — CONSULTS
Radiology Consult    Jhonny Diana is a 34 y.o. male with a history of liver transplant, IR consulted for transjugular liver biopsy with portosystemic pressure measurements.  Past Medical History:   Diagnosis Date    Abdominal pain 05/27/2018    Alcoholic cirrhosis 08/14/2018    Alcoholic hepatitis with ascites     Anemia     CKD (chronic kidney disease) stage 3, GFR 30-59 ml/min 08/14/2018    Encounter for blood transfusion     multiple blood transfusions    ESRD on dialysis     Herpes zoster without complication 12/11/2019    History of hematemesis 09/28/2017    Hypertension     Seizures     Substance abuse     alcohol and opioids    Thrombocytopenia     Transplanted liver     10/2017     Past Surgical History:   Procedure Laterality Date    APPENDECTOMY      CENTRAL VENOUS CATHETER TUNNELED INSERTION DOUBLE LUMEN      inserted and removed    ERCP      ERCP N/A 7/2/2018    Procedure: ERCP;  Surgeon: Solis Villanueva MD;  Location: [facility] ([address] [place] [address]);  Service: Endoscopy;  Laterality: N/A;  dialysis/cirrhosis/labs prior to ERCP/svn  platelet    ESOPHAGOGASTRODUODENOSCOPY      ESOPHAGOGASTRODUODENOSCOPY N/A 11/25/2019    Procedure: EGD (ESOPHAGOGASTRODUODENOSCOPY);  Surgeon: Sheri Siddiqui MD;  [facility]  Service: Endoscopy;  Laterality: N/A;    LIVER TRANSPLANT      10/2017    REPAIR, HERNIA, INGUINAL, WITHOUT HISTORY OF PRIOR REPAIR, AGE 5 YEARS OR OLDER Left 11/26/2024    Procedure: REPAIR, HERNIA, INGUINAL, WITHOUT HISTORY OF PRIOR REPAIR, AGE 5 YEARS OR OLDER;  Surgeon: Elian Bazan MD;  Location: [facility] OR [address] [place] [address];  Service: General;  Laterality: Left;           Scheduled Meds:    cefTRIAXone (Rocephin) IV (PEDS and ADULTS)  2 g Intravenous Q24H    mupirocin   Nasal BID    sirolimus  1 mg Oral Daily     Continuous Infusions:   PRN Meds:  Current Facility-Administered Medications:     acetaminophen, 650 mg, Oral, Q8H PRN    albuterol-ipratropium, 3 mL, Nebulization, Q4H PRN     aluminum-magnesium hydroxide-simethicone, 30 mL, Oral, QID PRN    bisacodyL, 10 mg, Rectal, Daily PRN    dextrose 50%, 12.5 g, Intravenous, PRN    dextrose 50%, 25 g, Intravenous, PRN    glucagon (human recombinant), 1 mg, Intramuscular, PRN    glucose, 16 g, Oral, PRN    glucose, 24 g, Oral, PRN    melatonin, 6 mg, Oral, Nightly PRN    naloxone, 0.02 mg, Intravenous, PRN    ondansetron, 4 mg, Intravenous, Q8H PRN    oxyCODONE, 30 mg, Oral, Q6H PRN    prochlorperazine, 5 mg, Intravenous, Q6H PRN    senna-docusate, 1 tablet, Oral, BID PRN    Pharmacy to dose Vancomycin consult, , , Once **AND** vancomycin - pharmacy to dose, , Intravenous, pharmacy to manage frequency    Allergies:   Review of patient's allergies indicates:   Allergen Reactions    Bactrim [sulfamethoxazole-trimethoprim] Other (See Comments)     Mookie Trell Syndrome       Labs:  Recent Labs   Lab 06/07/25  0705   INR 1.3*       Recent Labs   Lab 06/07/25  0146   WBC 6.34   HGB 8.5*   HCT 25.5*   MCV 99*   PLT 88*      Recent Labs   Lab 06/06/25  1635 06/07/25  0146   GLU 93  --    *  --    K 3.1*  --    CL 94*  --    CO2 24  --    BUN 28*  --    CREATININE 1.7*  --    CALCIUM 8.0*  --    MG 1.6 1.7   ALT 26  --    AST 62*  --    ALBUMIN 2.2*  --    BILITOT 7.1*  --          Vitals (Most Recent):  Temp: 99 °F (37.2 °C) (06/07/25 1214)  Pulse: 92 (06/07/25 1214)  Resp: 18 (06/07/25 1214)  BP: (!) 122/58 (06/07/25 1214)  SpO2: 95 % (06/07/25 1214)    Plan:   1. Patient not currently NPO, so will plan for transjugular liver biopsy tomorrow with moderate sedation. Please make NPO after midnight tonight.     Devante Rangel MD  Interventional Radiology  Department of Radiology

## 2025-06-07 NOTE — SUBJECTIVE & OBJECTIVE
Past Medical History:   Diagnosis Date    Abdominal pain 05/27/2018    Alcoholic cirrhosis 08/14/2018    Alcoholic hepatitis with ascites     Anemia     CKD (chronic kidney disease) stage 3, GFR 30-59 ml/min 08/14/2018    Encounter for blood transfusion     multiple blood transfusions    ESRD on dialysis     Herpes zoster without complication 12/11/2019    History of hematemesis 09/28/2017    Hypertension     Seizures     Substance abuse     alcohol and opioids    Thrombocytopenia     Transplanted liver     10/2017       Past Surgical History:   Procedure Laterality Date    APPENDECTOMY      CENTRAL VENOUS CATHETER TUNNELED INSERTION DOUBLE LUMEN      inserted and removed    ERCP      ERCP N/A 7/2/2018    Procedure: ERCP;  Surgeon: Solis Villanueva MD;  Location: [facility] (2ND FLR);  Service: Endoscopy;  Laterality: N/A;  dialysis/cirrhosis/labs prior to ERCP/svn  platelet    ESOPHAGOGASTRODUODENOSCOPY      ESOPHAGOGASTRODUODENOSCOPY N/A 11/25/2019    Procedure: EGD (ESOPHAGOGASTRODUODENOSCOPY);  Surgeon: Sheri Siddiqui MD;  Location: [facility] (2ND FLR);  Service: Endoscopy;  Laterality: N/A;    LIVER TRANSPLANT      10/2017    REPAIR, HERNIA, INGUINAL, WITHOUT HISTORY OF PRIOR REPAIR, AGE 5 YEARS OR OLDER Left 11/26/2024    Procedure: REPAIR, HERNIA, INGUINAL, WITHOUT HISTORY OF PRIOR REPAIR, AGE 5 YEARS OR OLDER;  Surgeon: Elian Bazan MD;  Location: [facility] OR [address] [place] [address];  Service: General;  Laterality: Left;       Review of patient's allergies indicates:   Allergen Reactions    Bactrim [sulfamethoxazole-trimethoprim] Other (See Comments)     Mookie Trell Syndrome     Current Facility-Administered Medications   Medication Frequency    acetaminophen tablet 650 mg Q8H PRN    albuterol-ipratropium 2.5 mg-0.5 mg/3 mL nebulizer solution 3 mL Q4H PRN    aluminum-magnesium hydroxide-simethicone 200-200-20 mg/5 mL suspension 30 mL QID PRN    bisacodyL suppository 10 mg Daily PRN    cefTRIAXone injection 2 g Q24H     dextrose 50% injection 12.5 g PRN    dextrose 50% injection 25 g PRN    glucagon (human recombinant) injection 1 mg PRN    glucose chewable tablet 16 g PRN    glucose chewable tablet 24 g PRN    melatonin tablet 6 mg Nightly PRN    mupirocin 2 % ointment BID    naloxone 0.4 mg/mL injection 0.02 mg PRN    ondansetron injection 4 mg Q8H PRN    oxyCODONE immediate release tablet Tab 30 mg Q6H PRN    prochlorperazine injection Soln 5 mg Q6H PRN    senna-docusate 8.6-50 mg per tablet 1 tablet BID PRN    sirolimus tablet 1 mg Daily     Family History       Problem Relation (Age of Onset)    Alcohol abuse Father    Cancer Father    No Known Problems Mother, Sister, Brother          Tobacco Use    Smoking status: Former     Current packs/day: 0.00     Average packs/day: 1 pack/day for 10.0 years (10.0 ttl pk-yrs)     Types: Cigarettes     Start date: 2007     Quit date: 2017     Years since quittin.3     Passive exposure: Current    Smokeless tobacco: Never    Tobacco comments:     Medical Marijuana smoker.    Substance and Sexual Activity    Alcohol use: No     Comment: a fifth of liquor daily for years, cut back over last 2 months    Drug use: No    Sexual activity: Not Currently     Partners: Male     Comment: single     Review of Systems   Constitutional:  Negative for activity change, chills, diaphoresis and unexpected weight change.   HENT:  Negative for congestion.    Eyes:  Negative for visual disturbance.   Respiratory:  Positive for shortness of breath. Negative for apnea, cough and wheezing.    Cardiovascular:  Positive for leg swelling. Negative for chest pain and palpitations.   Gastrointestinal:  Positive for abdominal distention. Negative for constipation, diarrhea, nausea and vomiting.   Endocrine: Negative for polyuria.   Genitourinary:  Positive for decreased urine volume and difficulty urinating. Negative for flank pain, frequency and hematuria.   Musculoskeletal:  Negative for back pain and  joint swelling.     Objective:     Vital Signs (Most Recent):  Temp: 99 °F (37.2 °C) (06/07/25 1214)  Pulse: 90 (06/07/25 1440)  Resp: 18 (06/07/25 1233)  BP: (!) 122/58 (06/07/25 1214)  SpO2: 95 % (06/07/25 1214) Vital Signs (24h Range):  Temp:  [98.7 °F (37.1 °C)-99.6 °F (37.6 °C)] 99 °F (37.2 °C)  Pulse:  [] 90  Resp:  [18] 18  SpO2:  [93 %-98 %] 95 %  BP: (122-137)/(58-69) 122/58     Weight: 68.6 kg (151 lb 5.5 oz) (06/06/25 1630)  Body mass index is 24.43 kg/m².  Body surface area is 1.79 meters squared.    I/O last 3 completed shifts:  In: 850 [P.O.:600; IV Piggyback:250]  Out: -      Physical Exam  Vitals and nursing note reviewed.   Constitutional:       General: He is not in acute distress.     Appearance: Normal appearance. He is not ill-appearing or toxic-appearing.   HENT:      Head: Normocephalic and atraumatic.      Right Ear: External ear normal.      Left Ear: External ear normal.      Nose: Nose normal.      Mouth/Throat:      Mouth: Mucous membranes are moist.   Eyes:      Extraocular Movements: Extraocular movements intact.   Cardiovascular:      Rate and Rhythm: Normal rate and regular rhythm.      Pulses: Normal pulses.      Heart sounds: Normal heart sounds.   Pulmonary:      Effort: Pulmonary effort is normal.      Breath sounds: Examination of the right-upper field reveals decreased breath sounds. Examination of the right-middle field reveals decreased breath sounds. Examination of the right-lower field reveals decreased breath sounds. Decreased breath sounds present.   Abdominal:      General: There is distension.      Palpations: Abdomen is soft.   Musculoskeletal:      Cervical back: Normal range of motion and neck supple.      Right lower leg: Edema (4+ edema, shiny appearance) present.      Left lower leg: Edema (4+ edema, shiny appearance) present.   Skin:     General: Skin is warm.      Capillary Refill: Capillary refill takes less than 2 seconds.   Neurological:      General: No  focal deficit present.      Mental Status: He is oriented to person, place, and time.          Significant Labs:  CBC:   Recent Labs   Lab 06/07/25  0146   WBC 6.34   RBC 2.58*   HGB 8.5*   HCT 25.5*   PLT 88*   MCV 99*   MCH 32.9*   MCHC 33.3     CMP:   Recent Labs   Lab 06/07/25 0146   *   CALCIUM 7.6*   ALBUMIN 1.8*   PROT 5.7*   *   K 3.4*   CO2 24   CL 99   BUN 27*   CREATININE 1.5*   ALKPHOS 124   ALT 24   AST 55*   BILITOT 5.8*     All labs within the past 24 hours have been reviewed.    Significant Imaging:  CT: Reviewed

## 2025-06-07 NOTE — PLAN OF CARE
Pt. aao x 3  Pt. Will be npo for liver u/s in the am 6/8/2025  Pt. Ambulates without assistance  Pt. Has pain management ordered  Pt. Cbg's WNL  Pt. Continent to bowel and bladder

## 2025-06-07 NOTE — PROGRESS NOTES
VANCOMYCIN DOSING BY PHARMACY DISCONTINUATION NOTE    Therapy with Vancomycin complete and/or consult discontinued by provider.  Pharmacy will sign off, please re-consult as needed.    Anoop Cunningham PharmD

## 2025-06-07 NOTE — CONSULTS
"Pulmonary & Critical Care Medicine Consult Note    Primary Attending Physician: Dr. Fuentes  Consultant Attending: Dr. Mark  Consultant Fellow: Dr. Nieto     Reason for Consult:     "eval for thoracentesis, larger effusion on imaging"    Subjective:      History of Present Illness:  Jhonny Diana is a 34 y.o.  male who  has a past medical history of Abdominal pain (05/27/2018), Alcoholic cirrhosis (08/14/2018), Alcoholic hepatitis with ascites, Anemia, CKD (chronic kidney disease) stage 3, GFR 30-59 ml/min (08/14/2018), Encounter for blood transfusion, ESRD on dialysis, Herpes zoster without complication (12/11/2019), History of hematemesis (09/28/2017), Hypertension, Seizures, Substance abuse, Thrombocytopenia, and Transplanted liver.. The patient presented to the [facility] on 6/6/2025 with a primary complaint of Liver Transplant Follow-up      34-year-old male with history of limb for transplant in 2017 who presented at [facility] on 06/02 with shortness of breath, jaundice, and anasarca he was found to have a large right-sided pleural effusion in on 06/03 he had a thoracentesis of 1 L of reddish-brown fluid removal.  Pleural fluid studies were likely consistent with transudative fluid of a glucose of 91, LDH of 113, pH of 8, protein 3.5, WBC of 171 and RBC of 20,000.  She was also found to be significantly volume overloaded with pain edema in bilateral lower extremities and minimal ascites.  On 06/05 he was found to have a positive blood culture for GPCs and he was transferred for infectious disease workup as they did not have that at his hospital.  Today, he had a CT chest that has significant for large right-sided pleural effusion, final read pending radiologist.  Pulmonary was consulted for this effusion.  Pulmonary history is significant for previous cigarette use who quit smoking in 2017 when he had his liver transplant.  Prior to that he was smoking 2 packs per day for 13 years.  He denies " environmental exposures or previous lung issues.  He has had multiple thoracentesis in the past before his liver transplant and when he was in the hospital post transplantation.  He has never had surgery on his chest.  Currently, patient is satting well on room air and has no complaints of shortness of breath.  He states he has not had shortness of breath since he had his thoracentesis on the 3rd.  Before that he was not able to lay flat but now he is able to do so.  He is able to walk and converse without dyspnea.  He does note a dry cough that started after he had his MRCP on May 16th.  This is also when he started noticing more edema that has progressed since then.      Past Medical History:  Past Medical History:   Diagnosis Date    Abdominal pain 05/27/2018    Alcoholic cirrhosis 08/14/2018    Alcoholic hepatitis with ascites     Anemia     CKD (chronic kidney disease) stage 3, GFR 30-59 ml/min 08/14/2018    Encounter for blood transfusion     multiple blood transfusions    ESRD on dialysis     Herpes zoster without complication 12/11/2019    History of hematemesis 09/28/2017    Hypertension     Seizures     Substance abuse     alcohol and opioids    Thrombocytopenia     Transplanted liver     10/2017       Past Surgical History:  Past Surgical History:   Procedure Laterality Date    APPENDECTOMY      CENTRAL VENOUS CATHETER TUNNELED INSERTION DOUBLE LUMEN      inserted and removed    ERCP      ERCP N/A 7/2/2018    Procedure: ERCP;  Surgeon: Solis Villanueva MD;  Location: [facility] ([address] [place] [address]);  Service: Endoscopy;  Laterality: N/A;  dialysis/cirrhosis/labs prior to ERCP/svn  platelet    ESOPHAGOGASTRODUODENOSCOPY      ESOPHAGOGASTRODUODENOSCOPY N/A 11/25/2019    Procedure: EGD (ESOPHAGOGASTRODUODENOSCOPY);  Surgeon: Sheri Siddiqui MD;  Location: [facility] ([address] [place] [address]);  Service: Endoscopy;  Laterality: N/A;    LIVER TRANSPLANT      10/2017    REPAIR, HERNIA, INGUINAL, WITHOUT HISTORY OF PRIOR REPAIR, AGE 5 YEARS  OR OLDER Left 11/26/2024    Procedure: REPAIR, HERNIA, INGUINAL, WITHOUT HISTORY OF PRIOR REPAIR, AGE 5 YEARS OR OLDER;  Surgeon: Elian Bazan MD;  Location: [facility] OR [address] [place] [address];  Service: General;  Laterality: Left;       Allergies:  Review of patient's allergies indicates:   Allergen Reactions    Bactrim [sulfamethoxazole-trimethoprim] Other (See Comments)     Mookie Trell Syndrome       Medications:   In-Hospital Scheduled Medications:   albumin human 25%  25 g Intravenous Once    cefTRIAXone (Rocephin) IV (PEDS and ADULTS)  2 g Intravenous Q24H    furosemide (LASIX) injection  80 mg Intravenous Once    mupirocin   Nasal BID    sirolimus  1 mg Oral Daily      In-Hospital PRN Medications:    Current Facility-Administered Medications:     acetaminophen, 650 mg, Oral, Q8H PRN    albuterol-ipratropium, 3 mL, Nebulization, Q4H PRN    aluminum-magnesium hydroxide-simethicone, 30 mL, Oral, QID PRN    bisacodyL, 10 mg, Rectal, Daily PRN    dextrose 50%, 12.5 g, Intravenous, PRN    dextrose 50%, 25 g, Intravenous, PRN    glucagon (human recombinant), 1 mg, Intramuscular, PRN    glucose, 16 g, Oral, PRN    glucose, 24 g, Oral, PRN    melatonin, 6 mg, Oral, Nightly PRN    naloxone, 0.02 mg, Intravenous, PRN    ondansetron, 4 mg, Intravenous, Q8H PRN    oxyCODONE, 30 mg, Oral, Q6H PRN    prochlorperazine, 5 mg, Intravenous, Q6H PRN    senna-docusate, 1 tablet, Oral, BID PRN   In-Hospital IV Infusion Medications:     Home Medications:  Prior to Admission medications    Medication Sig Start Date End Date Taking? Authorizing Provider   diazePAM (VALIUM) 5 MG tablet Take 5 mg by mouth Daily.   Yes Provider, Historical   mycophenolate sodium 360 MG TbEC Take 360 mg by mouth 2 (two) times daily.   Yes Provider, Historical   oxyCODONE (ROXICODONE) 30 MG Tab Take 30 mg by mouth every 6 (six) hours as needed (pain).   Yes Provider, Historical   sirolimus (RAPAMUNE) 1 MG Tab Take 1 tablet (1 mg total) by mouth once daily. 4/21/25  26 Yes Laura Morgan MD   tiZANidine 4 mg Cap Take 1 tablet by mouth every evening.   Yes Provider, Historical   valACYclovir (VALTREX) 500 MG tablet Take 500 mg by mouth once daily. 7/10/23  Yes Provider, Historical   naloxone (NARCAN) 4 mg/actuation Spry CALL 911. SPR CONTENTS OF ONE SPRAYER (0.1ML) INTO ONE NOSTRIL. REPEAT IN 2-3 MIN IF SYMPTOMS OF OPIOID EMERGENCY PERSIST, ALTERNATE NOSTRILS 23   Provider, Historical       Family History:  Family History   Problem Relation Name Age of Onset    No Known Problems Mother      Cancer Father          Lung cancer    Alcohol abuse Father      No Known Problems Sister 3     No Known Problems Brother 1     Kidney disease Neg Hx         Social History:  Social History[1]    Review of Systems:  See HPI. All other systems are reviewed and are negative.     Objective:   Last 24 Hour Vital Signs:  BP  Min: 122/58  Max: 137/69  Temp  Av.2 °F (37.3 °C)  Min: 98.8 °F (37.1 °C)  Max: 99.6 °F (37.6 °C)  Pulse  Av.1  Min: 88  Max: 107  Resp  Av  Min: 18  Max: 18  SpO2  Av %  Min: 93 %  Max: 98 %  I/O last 3 completed shifts:  In: 850 [P.O.:600; IV Piggyback:250]  Out: -     Physical Examination:  Physical Exam  Constitutional:       General: He is not in acute distress.     Appearance: He is ill-appearing. He is not toxic-appearing.   Eyes:      General: Scleral icterus present.      Extraocular Movements: Extraocular movements intact.   Cardiovascular:      Rate and Rhythm: Normal rate and regular rhythm.      Pulses: Normal pulses.      Heart sounds: Normal heart sounds. No murmur heard.  Pulmonary:      Effort: Pulmonary effort is normal.      Breath sounds: No wheezing, rhonchi or rales.      Comments: Minimal breath sound on right  Abdominal:      General: Abdomen is flat. There is no distension.      Palpations: Abdomen is soft.      Tenderness: There is no abdominal tenderness.   Musculoskeletal:         General: Swelling present.      Right  "lower leg: Edema (+4) present.      Left lower leg: Edema (+4) present.      Comments: Clubbing in fingers    Skin:     Coloration: Skin is jaundiced.      Findings: Bruising present.   Neurological:      General: No focal deficit present.      Mental Status: He is alert. Mental status is at baseline.   Psychiatric:         Mood and Affect: Mood normal.         Behavior: Behavior normal.          Laboratory:  Trended Lab Data:  Recent Labs     06/06/25  1635 06/07/25  0146 06/07/25  0705   WBC 9.13 6.34  --    HGB 10.4* 8.5*  --    HCT 30.6* 25.5*  --    * 88*  --    * 131*  --    K 3.1* 3.4*  --    CL 94* 99  --    CO2 24 24  --    BUN 28* 27*  --    CREATININE 1.7* 1.5*  --    GLU 93 115*  --    BILITOT 7.1* 5.8*  --    AST 62* 55*  --    ALT 26 24  --    ALKPHOS 179* 124  --    CALCIUM 8.0* 7.6*  --    ALBUMIN 2.2* 1.8*  --    PROT 6.8 5.7*  --    MG 1.6 1.7  --    PHOS 2.7 2.4*  --    INR 1.2  --  1.3*       Cardiac: No results for input(s): "TROPONINI", "CKTOTAL", "CKMB", "BNP" in the last 168 hours.    Urinalysis:   Lab Results   Component Value Date    LABURIN  05/28/2018     Multiple organisms isolated. None in predominance.  Repeat if    LABURIN clinically necessary. 05/28/2018    COLORU Yellow 06/06/2025    CLARITYU Clear 05/29/2019    SPECGRAV 1.010 06/06/2025    NITRITE Negative 06/06/2025    GLUCOSEU Negative 05/29/2019    KETONESU Negative 11/25/2024    UROBILINOGEN 2.0-3.0 (A) 06/06/2025    BILIRUBINUR Negative 05/29/2019       Microbiology:  Microbiology Results (last 7 days)       Procedure Component Value Units Date/Time    Blood culture [[id number]]  (Normal) Collected: 06/06/25 1634    Order Status: Completed Specimen: Blood from Peripheral, Antecubital, Left Updated: 06/07/25 0103     Blood Culture No Growth After 6 Hours    Blood culture [[id number]]  (Normal) Collected: 06/06/25 1634    Order Status: Completed Specimen: Blood from Peripheral, Forearm, Left Updated: 06/07/25 0103 "     Blood Culture No Growth After 6 Hours            Radiology:    I have personally reviewed the above labs and imaging.      Assessment:     Jhonny Diana is a 34 y.o. male with:  Patient Active Problem List    Diagnosis Date Noted    Hypokalemia 06/07/2025    Hyponatremia 06/07/2025    CKD stage 3a, GFR 45-59 ml/min 06/07/2025    Lower extremity edema 06/06/2025    Bacteremia 06/06/2025    Unilateral inguinal hernia 11/25/2024    Chronic pain 09/19/2024    Cholestasis 06/05/2024    Therapeutic drug monitoring 05/27/2023    Stage 3b chronic kidney disease 05/27/2023    Anemia 05/27/2023    Hypertension secondary to other renal disorders 05/27/2023    Diarrhea 12/07/2019    LAURA (acute kidney injury) 11/24/2019    Weakness 11/23/2019    Right upper quadrant abdominal pain 11/23/2019    Anemia of chronic disease 11/23/2019    History of opioid abuse 11/23/2019    Lymphocytopenia 10/02/2018    Acute kidney injury superimposed on CKD 08/14/2018    Substance abuse     Moderate protein malnutrition 07/23/2018    Opioid dependence 06/29/2018    Anxiety about health 05/30/2018    Other pancytopenia 05/12/2018    Thrombocytopenia 05/12/2018    Chronic kidney disease-mineral and bone disorder 05/09/2018    Anemia of chronic renal failure     Alcohol use disorder, severe, in sustained remission 04/23/2018    Vitamin D deficiency 03/08/2018    Pleural effusion 02/02/2018    Constipation 01/20/2018    Ascites 01/11/2018    Hyperbilirubinemia 12/12/2017    Hypothyroidism 12/08/2017    Biliary stricture of transplanted liver 12/07/2017    Immunosuppression 11/03/2017    At risk for opportunistic infections 10/26/2017    History of liver transplant 10/20/2017    Prophylactic immunotherapy 10/20/2017    Long-term use of immunosuppressant medication 10/20/2017    Metabolic acidosis         Plan:     #pleural effusion, right sided  #anasarca  #Previous tobacco use  - Previous thoracentesis on 6/3 with 1 L removal, described as  reddish-brown  - asymptomatic and on room air    Recommendations:  - Even though patient is asymptomatic, will perform thoracentesis due to previous studies with an abnormal color   - Continue diuresis as tolerated by BP  - Encourage OOB as tolerated and IS use to help prevent atelectasis   - Continue with smoking cessation     Final plan pending attending attestation.    Thank you for allowing us to participate in the care of this patient. Please contact me if you have any questions regarding this consult.    Reynold Nieto MD  Pulmonary & Critical Care Medicine Fellow          [1]   Social History  Tobacco Use    Smoking status: Former     Current packs/day: 0.00     Average packs/day: 1 pack/day for 10.0 years (10.0 ttl pk-yrs)     Types: Cigarettes     Start date: 2007     Quit date: 2017     Years since quittin.3     Passive exposure: Current    Smokeless tobacco: Never    Tobacco comments:     Medical Marijuana smoker.    Substance Use Topics    Alcohol use: No     Comment: a fifth of liquor daily for years, cut back over last 2 months    Drug use: No

## 2025-06-07 NOTE — HPI
Mr. 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 His post-transplant period was notable for LAURA previously on HD, anastomotic stricture requiring dilatation and stenting, and ETOH relapse (6/2019).     He was recently admitted to [facility] in [place] on 6/2 with SOB, jaundice, and anasarca. He was ultimately found to have a large right pleural effusion. He had a right US guided thoracentesis on 6/3 with removal of 1000 cc of reddish-brown otherwise nonturbid fluid. Thora fluid studies: Gluc 91, , pH 8, Prot 3.5,  with 15% PMN, RBC 20,6000.    Nephrology consulted for elevated serum creatinine and edema. Review of his outpatient labs show a baseline serum creatinine of 1.5-1.7 range, currently at 1.5 at the time of this consult. Patient reports noticing increasing lower extremity edema starting on 5/16, and has progressed so severely that he cannot put on compression stockings or shoes. He does have a low albumin level of 1.8 on serum chemistry. Since the May 16th he has noticed that it has been increasingly more difficult to pass urine and thinks that he has been producing less urine. Due to his increased dyspnea and pleural effusion, he was not eating or drinking the the week leading up to admission.     Blood cultures 2/2 turned positive for GPC. He was started on Vancomycin and Rocephin 2g iv daily. Current VS 6/4 at 4pm: T 98.0F, P 80, RR 80, /55.

## 2025-06-07 NOTE — ASSESSMENT & PLAN NOTE
34M with history of EtOH cirrhosis s/p liver transplant 2017, on sirolimus, umbilical hernia repair with mesh placement 4/2025, presented to [facility] 6/2/2025 with SOB and jaundice, found to have LAURA, large right pleural effusion s/p thoracentesis 6/3/2025 ( with 15% PMN, RBC 20K), hyperbilirubinemia, plans for liver biopsy. ID consulted for Staph hominis in 1/2 sets of blood cultures, likely contaminant.    Recommendation:  - Discontinue vancomycin IV as Staph hominis bacteremia in 1 set of blood cultures is likely contaminant  - Follow-up repeat blood cultures performed on admission  - CMV ordered for AM

## 2025-06-07 NOTE — ASSESSMENT & PLAN NOTE
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 Follows with Dr. Morgan. Hx of anastomotic stricture requiring dilatation and stenting. Had been having uptrending bilirubin and MRCP ordered outpatient, done at [facility] as outpatient. Admitted to [facility] with Tbili of 8, ascites, transudative pleural effusion.     - RUQ US done  - f/u MRCP results from [facility]   - continue sirolimus 1mg BID  - hepatology consulted > TJ liver biopsy with portal pressures  - valaclovir PPx continued

## 2025-06-07 NOTE — CONSULTS
[facility] Consult Note    Patient Name: Jhonny Diana  MRN: [id number]  Location: 96749/19[address] A  Attending: Nilda Cruz MD   Admit Date: 6/6/2025  Today's Date: 06/07/2025      SUBJECTIVE:     HPI:  Jhonny Diana is a 34 year old male for whom Hepatology is consulted for IS management. He has a history of alcoholic hepatitis s/p liver transplant 10/19/2017 on Sirolimus 1 mg daily with post transplant course complicated by alcohol relapse 2019 (most recent PETH 9/2024 <10), anastomotic stricture requiring stenting, LAURA previously on HD followed by Dr. Morgan, umbilical hernia repair 11/2022, open L inguinal hernia repair 1/2025 and R inguinal hernia repair 4/2025.     He initially presented to [facility] in [place] on 6/2 with reports of SOB, abdominal distension, LE edema and jaundice. Initial laboratory workup notable for WBC 7.1, Hg 10.6, [id number], AST 46, ALT 28, TB 8.2, NH3 57, , INR 1.3. Imaging significant for a L R pleural effusion s/p thoracentesis on 6/03 with removal of 1L of reddish-brown fluid. Abdominal US with trace ascites, insufficient for ultrasound-guided drainage. Hospital course complicated by LAURA with Cr peak 2.02, started on diuresis with improvement. His blood cx were positive for GPC, started on vancomycin and CTX. [place] was ultimately [place] [facility] [place]. Most recent labs from 6/06 Na 131, Cr 1.7, Albumin 2.2, T bili 7.1, AST 62. Of note, his liver enzymes have been persistently elevated since his most recent hernia repair 4/2025, with T bili in the 4s. He recently had MRCP which was discussed in IR conference 6/03/2025; large R pleural effusion, splenomegaly, cirrhosis with portal hypertension. Plan from committee meeting was to obtain a thoracentesis and TJ liver bx. Last liver bx from 8/2024 with mild-moderate portal and lobular hepatitis, stage 2 fibrosis.       Review of patient's allergies indicates:   Allergen Reactions     Bactrim [sulfamethoxazole-trimethoprim] Other (See Comments)     Mookie Trell Syndrome       Past Medical History:   Diagnosis Date    Abdominal pain 05/27/2018    Alcoholic cirrhosis 08/14/2018    Alcoholic hepatitis with ascites     Anemia     CKD (chronic kidney disease) stage 3, GFR 30-59 ml/min 08/14/2018    Encounter for blood transfusion     multiple blood transfusions    ESRD on dialysis     Herpes zoster without complication 12/11/2019    History of hematemesis 09/28/2017    Hypertension     Seizures     Substance abuse     alcohol and opioids    Thrombocytopenia     Transplanted liver     10/2017     Past Surgical History:   Procedure Laterality Date    APPENDECTOMY      CENTRAL VENOUS CATHETER TUNNELED INSERTION DOUBLE LUMEN      inserted and removed    ERCP      ERCP N/A 7/2/2018    Procedure: ERCP;  Surgeon: Solis Villanueva MD;  Location: [facility] ([address] [place] [address]);  Service: Endoscopy;  Laterality: N/A;  dialysis/cirrhosis/labs prior to ERCP/svn  platelet    ESOPHAGOGASTRODUODENOSCOPY      ESOPHAGOGASTRODUODENOSCOPY N/A 11/25/2019    Procedure: EGD (ESOPHAGOGASTRODUODENOSCOPY);  Surgeon: Sheri Siddiqui MD;  Location: [facility] ([address] [place] [address]);  Service: Endoscopy;  Laterality: N/A;    LIVER TRANSPLANT      10/2017    REPAIR, HERNIA, INGUINAL, WITHOUT HISTORY OF PRIOR REPAIR, AGE 5 YEARS OR OLDER Left 11/26/2024    Procedure: REPAIR, HERNIA, INGUINAL, WITHOUT HISTORY OF PRIOR REPAIR, AGE 5 YEARS OR OLDER;  Surgeon: Elian Bazan MD;  Location: [facility] OR [address] [place] [address];  Service: General;  Laterality: Left;     Family History   Problem Relation Name Age of Onset    No Known Problems Mother      Cancer Father          Lung cancer    Alcohol abuse Father      No Known Problems Sister 3     No Known Problems Brother 1     Kidney disease Neg Hx       Social History[1]    All home medications reviewed.    OBJECTIVE:     Vital Signs Trends/History Reviewed  Vitals:    06/06/25 2336 06/07/25 0009 06/07/25 0451 06/07/25 0649    BP:   (!) 122/58    BP Location:   Right arm    Patient Position:   Lying    Pulse: 107  98    Resp:  18 18 18   Temp:   99.6 °F (37.6 °C)    TempSrc:   Oral    SpO2:   95%    Weight:       Height:             Physical Exam  Vitals reviewed.   Constitutional:       General: He is not in acute distress.     Appearance: He is not diaphoretic.   Eyes:      General: Scleral icterus present.   Cardiovascular:      Rate and Rhythm: Normal rate.   Pulmonary:      Effort: Pulmonary effort is normal.   Abdominal:      General: There is no distension.      Tenderness: There is no abdominal tenderness.   Musculoskeletal:      Right lower leg: Edema present.      Left lower leg: Edema present.   Neurological:      Mental Status: He is alert.         Laboratory: All labs within last 24 hours reviewed.     MELD:   MELD 3.0: 26 at 6/6/2025  4:35 PM  MELD-Na: 25 at 6/6/2025  4:35 PM  Calculated from:  Serum Creatinine: 1.7 mg/dL at 6/6/2025  4:35 PM  Serum Sodium: 131 mmol/L at 6/6/2025  4:35 PM  Total Bilirubin: 7.1 mg/dL at 6/6/2025  4:35 PM  Serum Albumin: 2.2 g/dL at 6/6/2025  4:35 PM  INR(ratio): 1.2 at 6/6/2025  4:35 PM  Age at listing (hypothetical): 34 years  Sex: Male at 6/6/2025  4:35 PM      Imaging: All imaging within last 24 hours reviewed.       Scheduled Medications:    cefTRIAXone (Rocephin) IV (PEDS and ADULTS)  2 g Intravenous Q24H    mupirocin   Nasal BID    sirolimus  1 mg Oral Daily       PRN Medications:     Current Facility-Administered Medications:     acetaminophen, 650 mg, Oral, Q8H PRN    albuterol-ipratropium, 3 mL, Nebulization, Q4H PRN    aluminum-magnesium hydroxide-simethicone, 30 mL, Oral, QID PRN    bisacodyL, 10 mg, Rectal, Daily PRN    dextrose 50%, 12.5 g, Intravenous, PRN    dextrose 50%, 25 g, Intravenous, PRN    glucagon (human recombinant), 1 mg, Intramuscular, PRN    glucose, 16 g, Oral, PRN    glucose, 24 g, Oral, PRN    melatonin, 6 mg, Oral, Nightly PRN    naloxone, 0.02 mg,  Intravenous, PRN    ondansetron, 4 mg, Intravenous, Q8H PRN    oxyCODONE, 30 mg, Oral, Q6H PRN    prochlorperazine, 5 mg, Intravenous, Q6H PRN    senna-docusate, 1 tablet, Oral, BID PRN    Pharmacy to dose Vancomycin consult, , , Once **AND** vancomycin - pharmacy to dose, , Intravenous, pharmacy to manage frequency    ASSESSMENT:    34M with alcoholic hepatitis s/p liver transplant 10/19/2017 on Sirolimus 1 mg daily with post transplant course complicated by alcohol relapse 2019 (most recent PETH 9/2024 <10), anastomotic stricture requiring stenting, LAURA previously on HD followed by Dr. Morgan, umbilical hernia repair 11/2022, open L inguinal hernia repair 1/2025 and R inguinal hernia repair 4/2025, persistently elevated LFTs since most recent hernia repair who initially presented to [facility] with SOB and jaundice, found to have a large R pleural effusion s/p thoracentesis, elevated liver enzymes and LAURA, ultimately sent to [facility] on 6/06 for higher level of care. Hepatology consulted for IS management and hyperbilirubinemia. Most recent labs from 6/06 Na 131, Cr 1.7, Albumin 2.2, T bili 7.1, AST 62.     Problem List:  Alcoholic hepatitis s/p liver transplant on IS  Elevated liver enzymes  Stage 2 fibrosis on liver bx 2024, now with imaging concerning for cirrhosis  Alcohol use disorder (in remission)  LAURA  Pleural effusion s/p thoracentesis  LE edema      RECOMMENDATIONS:    -Consult IR for TJ liver bx with portal pressures.   -Continue Sirolimus at current dose with daily levels.   -CMP, INR daily.    Thank you for allowing us to participate in the care of this patient. Please call with questions.      Roya Lizarraga MD  Gastroenterology Fellow, PGY-V  [facility]          [1]   Social History  Tobacco Use    Smoking status: Former     Current packs/day: 0.00     Average packs/day: 1 pack/day for 10.0 years (10.0 ttl pk-yrs)     Types: Cigarettes     Start date: 2/19/2007     Quit date: 2/19/2017     Years  since quittin.3     Passive exposure: Current    Smokeless tobacco: Never    Tobacco comments:     Medical Marijuana smoker.    Substance Use Topics    Alcohol use: No     Comment: a fifth of liquor daily for years, cut back over last 2 months    Drug use: No

## 2025-06-07 NOTE — ASSESSMENT & PLAN NOTE
Patient's most recent potassium results are listed below.   Recent Labs     06/06/25  1635 06/07/25  0146   K 3.1* 3.4*     Plan  - Replete potassium per protocol  - Monitor potassium Daily  - Patient's hypokalemia is stable

## 2025-06-07 NOTE — SUBJECTIVE & OBJECTIVE
Past Medical History:   Diagnosis Date    Abdominal pain 05/27/2018    Alcoholic cirrhosis 08/14/2018    Alcoholic hepatitis with ascites     Anemia     CKD (chronic kidney disease) stage 3, GFR 30-59 ml/min 08/14/2018    Encounter for blood transfusion     multiple blood transfusions    ESRD on dialysis     Herpes zoster without complication 12/11/2019    History of hematemesis 09/28/2017    Hypertension     Seizures     Substance abuse     alcohol and opioids    Thrombocytopenia     Transplanted liver     10/2017       Past Surgical History:   Procedure Laterality Date    APPENDECTOMY      CENTRAL VENOUS CATHETER TUNNELED INSERTION DOUBLE LUMEN      inserted and removed    ERCP      ERCP N/A 7/2/2018    Procedure: ERCP;  Surgeon: Solis Villanueva MD;  Location: [facility] (2ND FLR);  Service: Endoscopy;  Laterality: N/A;  dialysis/cirrhosis/labs prior to ERCP/svn  platelet    ESOPHAGOGASTRODUODENOSCOPY      ESOPHAGOGASTRODUODENOSCOPY N/A 11/25/2019    Procedure: EGD (ESOPHAGOGASTRODUODENOSCOPY);  Surgeon: Sheri Siddiqui MD;  Location: [facility] ([facility]  Service: Endoscopy;  Laterality: N/A;    LIVER TRANSPLANT      10/2017    REPAIR, HERNIA, INGUINAL, WITHOUT HISTORY OF PRIOR REPAIR, AGE 4 YEARS OR OLDER Left 11/26/2024    Procedure: REPAIR, HERNIA, INGUINAL, WITHOUT HISTORY OF PRIOR REPAIR, AGE 5 YEARS OR OLDER;  Surgeon: Elian Bazan MD;  Location: [facility] OR [address] [place] [address];  Service: General;  Laterality: Left;       Review of patient's allergies indicates:   Allergen Reactions    Bactrim [sulfamethoxazole-trimethoprim] Other (See Comments)     Mookie Trell Syndrome       Medications:  Medications Prior to Admission   Medication Sig    diazePAM (VALIUM) 5 MG tablet Take 5 mg by mouth Daily.    mycophenolate sodium 360 MG TbEC Take 360 mg by mouth 2 (two) times daily.    oxyCODONE (ROXICODONE) 30 MG Tab Take 30 mg by mouth every 6 (six) hours as needed (pain).    sirolimus (RAPAMUNE) 1 MG Tab Take 1 tablet (1  "mg total) by mouth once daily.    tiZANidine 4 mg Cap Take 1 tablet by mouth every evening.    valACYclovir (VALTREX) 500 MG tablet Take 500 mg by mouth once daily.    naloxone (NARCAN) 4 mg/actuation Spry CALL 911. SPR CONTENTS OF ONE SPRAYER (0.1ML) INTO ONE NOSTRIL. REPEAT IN 2-3 MIN IF SYMPTOMS OF OPIOID EMERGENCY PERSIST, ALTERNATE NOSTRILS     Antibiotics (From admission, onward)      Start     Stop Route Frequency Ordered    25 2100  mupirocin 2 % ointment         25 Nasl 2 times daily 25 17525 184  cefTRIAXone injection 2 g         -- IV Every 24 hours (non-standard times) 25 17325 183  vancomycin - pharmacy to dose  (vancomycin IVPB (PEDS and ADULTS))        Placed in "And" Linked Group    -- IV pharmacy to manage frequency 25 173          Antifungals (From admission, onward)      None          Antivirals (From admission, onward)      None             Immunization History   Administered Date(s) Administered    COVID-19, MRNA, LN-S, PF (Pfizer) (Purple Cap) 2021, 2021, 2021    Hepatitis A, Adult 2018    Hepatitis A, Pediatric/Adolescent, 2 Dose 10/11/2017    Hepatitis B, Adult 10/12/2017    Influenza - Trivalent - Fluzone High Dose - PF (65 years and older) 10/12/2017    Pneumococcal Conjugate - 13 Valent 10/12/2017    Pneumococcal Polysaccharide - 23 Valent 2018    Tdap 10/11/2017       Family History       Problem Relation (Age of Onset)    Alcohol abuse Father    Cancer Father    No Known Problems Mother, Sister, Brother          Social History     Socioeconomic History    Marital status: Single   Occupational History     Comment: full time    Tobacco Use    Smoking status: Former     Current packs/day: 0.00     Average packs/day: 1 pack/day for 10.0 years (10.0 ttl pk-yrs)     Types: Cigarettes     Start date: 2007     Quit date: 2017     Years since quittin.3     Passive exposure: Current    Smokeless " tobacco: Never    Tobacco comments:     Medical Marijuana smoker.    Substance and Sexual Activity    Alcohol use: No     Comment: a fifth of liquor daily for years, cut back over last 2 months    Drug use: No    Sexual activity: Not Currently     Partners: Male     Comment: single   Social History Narrative    He lives in [place] with his roommate      Social Drivers of Health     Financial Resource Strain: Medium Risk (6/6/2025)    Overall Financial Resource Strain (CARDIA)     Difficulty of Paying Living Expenses: Somewhat hard   Food Insecurity: Food Insecurity Present (6/6/2025)    Hunger Vital Sign     Worried About Running Out of Food in the Last Year: Sometimes true     Ran Out of Food in the Last Year: Sometimes true   Transportation Needs: Unmet Transportation Needs (6/6/2025)    PRAPARE - Transportation     Lack of Transportation (Medical): No     Lack of Transportation (Non-Medical): Yes   Physical Activity: Sufficiently Active (9/20/2024)    Exercise Vital Sign     Days of Exercise per Week: 5 days     Minutes of Exercise per Session: 30 min   Stress: Stress Concern Present (6/6/2025)    [employer] [place] [employer] - Occupational Stress Questionnaire     Feeling of Stress : To some extent   Housing Stability: Low Risk  (6/6/2025)    Housing Stability Vital Sign     Unable to Pay for Housing in the Last Year: No     Homeless in the Last Year: No     Review of Systems   Constitutional:  Negative for chills, diaphoresis and fever.   HENT:  Negative for rhinorrhea and sore throat.    Respiratory:  Positive for shortness of breath. Negative for cough.    Cardiovascular:  Positive for leg swelling. Negative for chest pain.   Gastrointestinal:  Negative for abdominal pain, diarrhea, nausea and vomiting.   Genitourinary:  Negative for dysuria and hematuria.   Musculoskeletal:  Negative for arthralgias and myalgias.   Skin:  Negative for rash.   Neurological:  Negative for headaches.      Objective:     Vital Signs (Most Recent):  Temp: 99.3 °F (37.4 °C) (06/07/25 0748)  Pulse: 101 (06/07/25 0748)  Resp: 18 (06/07/25 0748)  BP: 131/60 (06/07/25 0748)  SpO2: (!) 93 % (06/07/25 0748) Vital Signs (24h Range):  Temp:  [98.7 °F (37.1 °C)-99.6 °F (37.6 °C)] 99.3 °F (37.4 °C)  Pulse:  [] 101  Resp:  [18] 18  SpO2:  [93 %-98 %] 93 %  BP: (122-137)/(58-69) 131/60     Weight: 68.6 kg (151 lb 5.5 oz)  Body mass index is 24.43 kg/m².    Estimated Creatinine Clearance: 54.7 mL/min (A) (based on SCr of 1.7 mg/dL (H)).     Physical Exam  Vitals reviewed.   Constitutional:       General: He is not in acute distress.     Appearance: He is well-developed. He is not diaphoretic.   HENT:      Head: Normocephalic and atraumatic.      Nose: Nose normal.   Eyes:      Conjunctiva/sclera: Conjunctivae normal.   Pulmonary:      Effort: Pulmonary effort is normal. No respiratory distress.   Abdominal:      General: Abdomen is flat. There is no distension.   Musculoskeletal:      Cervical back: Normal range of motion.      Right lower leg: No edema.      Left lower leg: No edema.   Skin:     General: Skin is warm and dry.      Findings: No erythema or rash.   Neurological:      Mental Status: He is alert.   Psychiatric:         Behavior: Behavior normal.          Significant Labs: All pertinent labs within the past 24 hours have been reviewed.    Significant Imaging: I have reviewed all pertinent imaging results/findings within the past 24 hours.

## 2025-06-07 NOTE — ASSESSMENT & PLAN NOTE
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 Follows with Dr. oMrgan. Hx of anastomotic stricture requiring dilatation and stenting. Had been having uptrending bilirubin and MRCP ordered outpatient, done at [facility] as outpatient. Admitted to [facility] with Tbili of 8, ascites, transudative pleural effusion.     - RUQ US done  - f/u MRCP results from [facility]   - continue sirolimus 1mg BID  - hepatology consulted > TJ liver biopsy with portal pressures  - valaclovir PPx continued

## 2025-06-08 PROBLEM — R78.81 BACTEREMIA: Status: RESOLVED | Noted: 2025-06-06 | Resolved: 2025-06-08

## 2025-06-08 LAB
ABSOLUTE EOSINOPHIL (OHS): 0.38 K/UL
ABSOLUTE MONOCYTE (OHS): 0.67 K/UL (ref 0.3–1)
ABSOLUTE NEUTROPHIL COUNT (OHS): 1.86 K/UL (ref 1.8–7.7)
ALBUMIN SERPL BCP-MCNC: 2.1 G/DL (ref 3.5–5.2)
ALP SERPL-CCNC: 141 UNIT/L (ref 40–150)
ALT SERPL W/O P-5'-P-CCNC: 22 UNIT/L (ref 10–44)
ANION GAP (OHS): 11 MMOL/L (ref 8–16)
AORTIC SIZE INDEX (SOV): 1.6 CM/M2
AORTIC SIZE INDEX: 1.4 CM/M2
ASCENDING AORTA: 2.6 CM
AST SERPL-CCNC: 53 UNIT/L (ref 11–45)
AV AREA BY CONTINUOUS VTI: 3.2 CM2
AV INDEX (PROSTH): 0.9
AV LVOT MEAN GRADIENT: 10 MMHG
AV LVOT PEAK GRADIENT: 14 MMHG
AV MEAN GRADIENT: 12 MMHG
AV PEAK GRADIENT: 16 MMHG
AV VALVE AREA BY VELOCITY RATIO: 3.3 CM²
AV VALVE AREA: 3.1 CM2
AV VELOCITY RATIO: 0.95
BASOPHILS # BLD AUTO: 0.04 K/UL
BASOPHILS NFR BLD AUTO: 0.9 %
BILIRUB SERPL-MCNC: 5.1 MG/DL (ref 0.1–1)
BSA FOR ECHO PROCEDURE: 1.86 M2
BUN SERPL-MCNC: 27 MG/DL (ref 6–20)
CALCIUM SERPL-MCNC: 7.8 MG/DL (ref 8.7–10.5)
CHLORIDE SERPL-SCNC: 94 MMOL/L (ref 95–110)
CO2 SERPL-SCNC: 29 MMOL/L (ref 23–29)
CREAT SERPL-MCNC: 1.5 MG/DL (ref 0.5–1.4)
CV ECHO LV RWT: 0.27 CM
DOP CALC AO PEAK VEL: 2 M/S
DOP CALC AO VTI: 40.1 CM
DOP CALC LVOT AREA: 3.5 CM2
DOP CALC LVOT DIAMETER: 2.1 CM
DOP CALC LVOT PEAK VEL: 1.9 M/S
DOP CALC LVOT STROKE VOLUME: 124.3 CM3
DOP CALCLVOT PEAK VEL VTI: 35.9 CM
E WAVE DECELERATION TIME: 110 MS
E/A RATIO: 1.39
E/E' RATIO: 10 M/S
ECHO EF ESTIMATED: 75 %
ECHO LV POSTERIOR WALL: 0.7 CM (ref 0.6–1.1)
EJECTION FRACTION: 68 %
ERYTHROCYTE [DISTWIDTH] IN BLOOD BY AUTOMATED COUNT: 13.4 % (ref 11.5–14.5)
FRACTIONAL SHORTENING: 44.2 % (ref 28–44)
GFR SERPLBLD CREATININE-BSD FMLA CKD-EPI: >60 ML/MIN/1.73/M2
GLUCOSE SERPL-MCNC: 108 MG/DL (ref 70–110)
HCT VFR BLD AUTO: 24.4 % (ref 40–54)
HGB BLD-MCNC: 8.5 GM/DL (ref 14–18)
IMM GRANULOCYTES # BLD AUTO: 0.02 K/UL (ref 0–0.04)
IMM GRANULOCYTES NFR BLD AUTO: 0.4 % (ref 0–0.5)
INR PPP: 1.3 (ref 0.8–1.2)
INTERVENTRICULAR SEPTUM: 0.7 CM (ref 0.6–1.1)
IVRT: 63 MS
LA MAJOR: 6.4 CM
LA MINOR: 6.2 CM
LA WIDTH: 5 CM
LEFT ATRIUM SIZE: 3.7 CM
LEFT ATRIUM VOLUME INDEX MOD: 34 ML/M2
LEFT ATRIUM VOLUME INDEX: 54 ML/M2
LEFT ATRIUM VOLUME MOD: 63 ML
LEFT ATRIUM VOLUME: 99 CM3
LEFT INTERNAL DIMENSION IN SYSTOLE: 2.9 CM (ref 2.1–4)
LEFT VENTRICLE DIASTOLIC VOLUME INDEX: 69.95 ML/M2
LEFT VENTRICLE DIASTOLIC VOLUME: 128 ML
LEFT VENTRICLE MASS INDEX: 67.1 G/M2
LEFT VENTRICLE SYSTOLIC VOLUME INDEX: 18 ML/M2
LEFT VENTRICLE SYSTOLIC VOLUME: 33 ML
LEFT VENTRICULAR INTERNAL DIMENSION IN DIASTOLE: 5.2 CM (ref 3.5–6)
LEFT VENTRICULAR MASS: 122.8 G
LV LATERAL E/E' RATIO: 8.1
LV SEPTAL E/E' RATIO: 14.5
LYMPHOCYTES # BLD AUTO: 1.59 K/UL (ref 1–4.8)
MAGNESIUM SERPL-MCNC: 1.6 MG/DL (ref 1.6–2.6)
MCH RBC QN AUTO: 33.6 PG (ref 27–31)
MCHC RBC AUTO-ENTMCNC: 34.8 G/DL (ref 32–36)
MCV RBC AUTO: 96 FL (ref 82–98)
MV PEAK A VEL: 1.04 M/S
MV PEAK E VEL: 1.45 M/S
NUCLEATED RBC (/100WBC) (OHS): 0 /100 WBC
OHS CV RV/LV RATIO: 0.65 CM
PHOSPHATE SERPL-MCNC: 2.5 MG/DL (ref 2.7–4.5)
PISA TR MAX VEL: 2.5 M/S
PLATELET # BLD AUTO: 74 K/UL (ref 150–450)
PMV BLD AUTO: 9.3 FL (ref 9.2–12.9)
POTASSIUM SERPL-SCNC: 2.9 MMOL/L (ref 3.5–5.1)
PROT SERPL-MCNC: 5.7 GM/DL (ref 6–8.4)
PROTHROMBIN TIME: 13.5 SECONDS (ref 9–12.5)
RA MAJOR: 4.14 CM
RA PRESSURE ESTIMATED: 3 MMHG
RA WIDTH: 4.43 CM
RBC # BLD AUTO: 2.53 M/UL (ref 4.6–6.2)
RELATIVE EOSINOPHIL (OHS): 8.3 %
RELATIVE LYMPHOCYTE (OHS): 34.9 % (ref 18–48)
RELATIVE MONOCYTE (OHS): 14.7 % (ref 4–15)
RELATIVE NEUTROPHIL (OHS): 40.8 % (ref 38–73)
RIGHT VENTRICLE DIASTOLIC BASEL DIMENSION: 3.4 CM
RV TB RVSP: 6 MMHG
RV TISSUE DOPPLER FREE WALL SYSTOLIC VELOCITY 1 (APICAL 4 CHAMBER VIEW): 23.01 CM/S
SINUS: 2.94 CM
SIROLIMUS BLD-MCNC: 7 NG/ML (ref 4–20)
SODIUM SERPL-SCNC: 134 MMOL/L (ref 136–145)
STJ: 2.5 CM
TDI LATERAL: 0.18 M/S
TDI SEPTAL: 0.1 M/S
TDI: 0.14 M/S
TRICUSPID ANNULAR PLANE SYSTOLIC EXCURSION: 3.1 CM
TV PEAK GRADIENT: 25 MMHG
TV REST PULMONARY ARTERY PRESSURE: 28 MMHG
WBC # BLD AUTO: 4.56 K/UL (ref 3.9–12.7)
Z-SCORE OF LEFT VENTRICULAR DIMENSION IN END DIASTOLE: 0.25
Z-SCORE OF LEFT VENTRICULAR DIMENSION IN END SYSTOLE: -0.61

## 2025-06-08 PROCEDURE — 88313 SPECIAL STAINS GROUP 2: CPT | Mod: 26,NTX,, | Performed by: PATHOLOGY

## 2025-06-08 PROCEDURE — 63600175 PHARM REV CODE 636 W HCPCS: Mod: UD

## 2025-06-08 PROCEDURE — 0FB03ZX EXCISION OF LIVER, PERCUTANEOUS APPROACH, DIAGNOSTIC: ICD-10-PCS | Performed by: RADIOLOGY

## 2025-06-08 PROCEDURE — 82040 ASSAY OF SERUM ALBUMIN: CPT

## 2025-06-08 PROCEDURE — 99900035 HC TECH TIME PER 15 MIN (STAT)

## 2025-06-08 PROCEDURE — 25000003 PHARM REV CODE 250: Performed by: STUDENT IN AN ORGANIZED HEALTH CARE EDUCATION/TRAINING PROGRAM

## 2025-06-08 PROCEDURE — 83735 ASSAY OF MAGNESIUM: CPT

## 2025-06-08 PROCEDURE — 20600001 HC STEP DOWN PRIVATE ROOM

## 2025-06-08 PROCEDURE — 63600175 PHARM REV CODE 636 W HCPCS: Mod: UD | Performed by: RADIOLOGY

## 2025-06-08 PROCEDURE — B51V1ZZ FLUOROSCOPY OF OTHER VEINS USING LOW OSMOLAR CONTRAST: ICD-10-PCS | Performed by: RADIOLOGY

## 2025-06-08 PROCEDURE — 80195 ASSAY OF SIROLIMUS: CPT | Performed by: STUDENT IN AN ORGANIZED HEALTH CARE EDUCATION/TRAINING PROGRAM

## 2025-06-08 PROCEDURE — 88307 TISSUE EXAM BY PATHOLOGIST: CPT | Mod: 26,NTX,, | Performed by: PATHOLOGY

## 2025-06-08 PROCEDURE — 94761 N-INVAS EAR/PLS OXIMETRY MLT: CPT

## 2025-06-08 PROCEDURE — 85610 PROTHROMBIN TIME: CPT

## 2025-06-08 PROCEDURE — 25500020 PHARM REV CODE 255: Performed by: STUDENT IN AN ORGANIZED HEALTH CARE EDUCATION/TRAINING PROGRAM

## 2025-06-08 PROCEDURE — 85025 COMPLETE CBC W/AUTO DIFF WBC: CPT

## 2025-06-08 PROCEDURE — 88313 SPECIAL STAINS GROUP 2: CPT | Mod: TC,NTX | Performed by: STUDENT IN AN ORGANIZED HEALTH CARE EDUCATION/TRAINING PROGRAM

## 2025-06-08 PROCEDURE — 84100 ASSAY OF PHOSPHORUS: CPT

## 2025-06-08 PROCEDURE — 25000003 PHARM REV CODE 250

## 2025-06-08 RX ORDER — DIPHENHYDRAMINE HYDROCHLORIDE 50 MG/ML
INJECTION, SOLUTION INTRAMUSCULAR; INTRAVENOUS
Status: COMPLETED | OUTPATIENT
Start: 2025-06-08 | End: 2025-06-08

## 2025-06-08 RX ORDER — FENTANYL CITRATE 50 UG/ML
INJECTION, SOLUTION INTRAMUSCULAR; INTRAVENOUS
Status: COMPLETED | OUTPATIENT
Start: 2025-06-08 | End: 2025-06-08

## 2025-06-08 RX ORDER — POTASSIUM CHLORIDE 20 MEQ/1
40 TABLET, EXTENDED RELEASE ORAL ONCE
Status: COMPLETED | OUTPATIENT
Start: 2025-06-08 | End: 2025-06-08

## 2025-06-08 RX ADMIN — FENTANYL CITRATE 25 MCG: 50 INJECTION, SOLUTION INTRAMUSCULAR; INTRAVENOUS at 08:06

## 2025-06-08 RX ADMIN — DIPHENHYDRAMINE HYDROCHLORIDE 50 MG: 50 INJECTION, SOLUTION INTRAMUSCULAR; INTRAVENOUS at 08:06

## 2025-06-08 RX ADMIN — OXYCODONE HYDROCHLORIDE 30 MG: 10 TABLET ORAL at 11:06

## 2025-06-08 RX ADMIN — MUPIROCIN: 20 OINTMENT TOPICAL at 08:06

## 2025-06-08 RX ADMIN — POTASSIUM CHLORIDE 40 MEQ: 1500 TABLET, EXTENDED RELEASE ORAL at 09:06

## 2025-06-08 RX ADMIN — IOHEXOL 20 ML: 300 INJECTION, SOLUTION INTRAVENOUS at 08:06

## 2025-06-08 RX ADMIN — OXYCODONE HYDROCHLORIDE 30 MG: 10 TABLET ORAL at 05:06

## 2025-06-08 RX ADMIN — ONDANSETRON 4 MG: 2 INJECTION INTRAMUSCULAR; INTRAVENOUS at 06:06

## 2025-06-08 RX ADMIN — FENTANYL CITRATE 50 MCG: 50 INJECTION, SOLUTION INTRAMUSCULAR; INTRAVENOUS at 08:06

## 2025-06-08 RX ADMIN — MUPIROCIN: 20 OINTMENT TOPICAL at 09:06

## 2025-06-08 RX ADMIN — DIAZEPAM 5 MG: 5 TABLET ORAL at 08:06

## 2025-06-08 RX ADMIN — OXYCODONE HYDROCHLORIDE 30 MG: 10 TABLET ORAL at 12:06

## 2025-06-08 RX ADMIN — ACETAMINOPHEN 650 MG: 325 TABLET ORAL at 11:06

## 2025-06-08 RX ADMIN — SIROLIMUS 1 MG: 1 TABLET ORAL at 09:06

## 2025-06-08 NOTE — NURSING
"Pt. Refused blood glucose fingerstick this am; states" I don't see why I need to have them done"; educated pt. on the importance of cbg's; verbalized understanding. Notified CHARLINE Fuentes MD.  "

## 2025-06-08 NOTE — ASSESSMENT & PLAN NOTE
Mostly resolved at this point.    Hx of LAURA on CKD at OSH with cr rising to 2.5. Most recently before transfer was 1.69. baseline ~ 1.6-1.7. Started on Lasix 80 IV daily, spironolactone 100mg PO daily  at OSH with minor improvement in edema.     - monitor UOP  - consult nephrology     LAURA is likely due to unclear. Baseline creatinine is 1.6-1.7. Most recent creatinine and eGFR are listed below.  Recent Labs     06/06/25  1635 06/07/25  0146 06/08/25  0430   CREATININE 1.7* 1.5* 1.5*   EGFRNORACEVR 53* >60 >60      Plan  - LAURA is stable  - Avoid nephrotoxins and renally dose meds for GFR listed above  - Monitor urine output, serial BMP, and adjust therapy as needed

## 2025-06-08 NOTE — ASSESSMENT & PLAN NOTE
Hyponatremia is likely due to Cirrhosis. The patient's most recent sodium results are listed below.  Recent Labs     06/06/25  1635 06/07/25  0146 06/08/25  0430   * 131* 134*     Plan  - Monitor sodium Daily.   - Patient hyponatremia is stable

## 2025-06-08 NOTE — PLAN OF CARE
PT tolerated procedure well, specimen collected and sent to pathology, rg neck dressing c/d/I, called report to floor nurse, transport pt back to room

## 2025-06-08 NOTE — PLAN OF CARE
[facility] - Transplant Stepdown  Initial Discharge Assessment       Primary Care Provider: Elo Bull MD    Admission Diagnosis: Bacteremia [R78.81]  Liver failure [K72.90]    Admission Date: 6/6/2025  Expected Discharge Date: 6/10/2025    Transition of Care Barriers: (P) None    Payor: [facility] MCARE / Plan: [facility] MEDICARE COMPLETE / Product Type: Medicare Advantage /     Extended Emergency Contact Information  Primary Emergency Contact: Elvira Diana  Address: [address] [place] [address] [address]  Home Phone: [phone]  Work Phone: [phone]  Mobile Phone: [phone]  Relation: Mother  Secondary Emergency Contact: [place] [address] [place]  Mobile Phone: [phone]  Relation: Sister    Discharge Plan A: (P) Home with family  Discharge Plan B: (P) Home      [place] [facility] - [place] - [address]  [address]  [facility] [address]  Phone: [phone] Fax: [phone]      Initial Assessment (most recent)       Adult Discharge Assessment - 06/08/25 1617          Discharge Assessment    Assessment Type Discharge Planning Assessment (P)      Confirmed/corrected address, phone number and insurance Yes (P)      Confirmed Demographics Correct on Facesheet (P)      Source of Information patient (P)      Communicated DERICK with patient/caregiver Yes (P)      People in Home parent(s) (P)      Name(s) of People in Home Elvira Diana (Mother)  [phone] (P)      Do you expect to return to your current living situation? Yes (P)      Do you have help at home or someone to help you manage your care at home? Yes (P)      Who are your caregiver(s) and their phone number(s)? Elvira Diana (Mother)  [phone] (P)      Prior to hospitilization cognitive status: Alert/Oriented (P)      Current cognitive status: Alert/Oriented (P)      Walking or Climbing Stairs Difficulty yes (P)      Walking  or Climbing Stairs ambulation difficulty, requires equipment (P)      Mobility Management cane as needed (P)      Dressing/Bathing Difficulty no (P)      Home Accessibility wheelchair accessible (P)      Home Layout Able to live on 1st floor (P)      Equipment Currently Used at Home cane, straight (P)      Readmission within 30 days? No (P)      Patient currently being followed by outpatient case management? No (P)      Do you currently have service(s) that help you manage your care at home? No (P)      Do you take prescription medications? Yes (P)      Do you have prescription coverage? Yes (P)      Do you have any problems affording any of your prescribed medications? No (P)      Is the patient taking medications as prescribed? yes (P)      Who is going to help you get home at discharge? Elvira Diana (Mother)  [phone] (P)      How do you get to doctors appointments? family or friend will provide (P)      Are you on dialysis? No (P)      Do you take coumadin? No (P)      Discharge Plan A Home with family (P)      Discharge Plan B Home (P)      DME Needed Upon Discharge  none (P)      Discharge Plan discussed with: Patient (P)      Transition of Care Barriers None (P)         Physical Activity    On average, how many days per week do you engage in moderate to strenuous exercise (like a brisk walk)? 0 days (P)      On average, how many minutes do you engage in exercise at this level? 0 min (P)         Financial Resource Strain    How hard is it for you to pay for the very basics like food, housing, medical care, and heating? Not very hard (P)         Housing Stability    In the last 12 months, was there a time when you were not able to pay the mortgage or rent on time? No (P)      At any time in the past 12 months, were you homeless or living in a shelter (including now)? No (P)         Transportation Needs    In the past 12 months, has lack of transportation kept you from medical appointments or from getting  medications? No (P)      In the past 12 months, has lack of transportation kept you from meetings, work, or from getting things needed for daily living? No (P)         Food Insecurity    Within the past 12 months, you worried that your food would run out before you got the money to buy more. Never true (P)      Within the past 12 months, the food you bought just didn't last and you didn't have money to get more. Never true (P)         Alcohol Use    Q1: How often do you have a drink containing alcohol? Never (P)      Q2: How many drinks containing alcohol do you have on a typical day when you are drinking? Patient does not drink (P)      Q3: How often do you have six or more drinks on one occasion? Never (P)         Utilities    In the past 12 months has the electric, gas, oil, or water company threatened to shut off services in your home? No (P)         Health Literacy    How often do you need to have someone help you when you read instructions, pamphlets, or other written material from your doctor or pharmacy? Never (P)         Disposition/Patient Status    Reason For Admission history of liver transplant (P)                  Discharge Plan A and Plan B have been determined by review of patient's clinical status, future medical and therapeutic needs, and coverage/benefits for post-acute care in coordination with multidisciplinary team members.                 ,ASHLYN Friedman, LMSW  [facility]  Case Management  Ext. [phone]

## 2025-06-08 NOTE — PLAN OF CARE
Pt. aao x 4  Pt. Requires minimal staff assistance with adl's  Pt. Denies pain at this time has prn pain meds ordered  Pt. Ambulates without difficulty  Pt. Had +3 pitting edema and distended abdomen  Pt. Skin cdi  Pt. Had a transjugular liver biopsy today with a cdi dressing to right side of the neck.  Pt. Swallows meds whole  Pt. Tolerates care well

## 2025-06-08 NOTE — PLAN OF CARE
Pt arrived to [facility] for transjugular liver biopsy. Pt oriented to unit and staff, Pt safely transferred from stretcher to procedural table. Fall risk reviewed and comfort measures utilized with interventions. Safety strap applied, position pillows to minimize pressure points. Blankets applied. Pt prepped and draped utilizing standard sterile technique. Patient placed on continuous monitoring, as required by sedation policy. Timeouts implemented utilizing standard universal time-out per department and facility policy. RN to remain at bedside with continuous monitoring. Pt resting comfortably. Denies pain/discomfort. Will continue to monitor. See flow sheets for monitoring, medication administration, and updates. patient verbalizes understanding.

## 2025-06-08 NOTE — PROGRESS NOTES
[facility] - Transplant [facility] Medicine  Progress Note    Patient Name: Jhonny Diana  MRN: [id number]  Patient Class: IP- Inpatient   Admission Date: 6/6/2025  Length of Stay: 2 days  Attending Physician: Debo Fuentes MD  Primary Care Provider: Elo Bull MD        Subjective     Principal Problem:History of liver transplant        HPI:   Per  note:    Mr. 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 His post-transplant period was notable for LAURA previously on HD, anastomotic stricture requiring dilatation and stenting, and ETOH relapse (6/2019). He has followed with Dr. Julio in Hepatology, though his last clinic visit was 5/30/23. He is now followed by Dr. Morgan in Transplant Clinic. Per Dr. Michel at [facility], he is only on Sirolimus 1mg daily (of note, this was held a short time after his hernia repair).    He was recently admitted to [facility] in [place] on 6/2 with SOB, jaundice, and anasarca. Since admit, he has been taking his home Sirolimis.    He was ultimately found to have a large right pleural effusion. He had a right US guided thoracentesis on 6/3 with removal of 1000 cc of reddish-brown otherwise nonturbid fluid. Thora fluid studies: Gluc 91, , pH 8, Prot 3.5,  with 15% PMN, RBC 20,6000. He also had ascites, Abdominal U/S: Only trace fluid right upper quadrant insufficient for ultrasound-guided drainage.     He was also found to be in LAURA with Cr of 1.98, which then went to 2.02, and has now improved to 1.8 again with IV diuresis (Lasix + Metolazone). Nephrology there was consulted. At admit: WBC 7.1, Hg 10.6, [id number], AST 46, ALT 28, TB 8.2, NH3 57, , INR 1.3. TB today has improved to 5.6.    Yesterday, unexpectedly his  blood cultures 2/2 turned positive for GPC. He was started on Vancomycin and Rocephin 2g iv daily. They do not have ID service there and have not done TTE or BRADEN. Given elevated TB and bacteremia, GI there recommended transfer to center with Hepatology to help manage him with history of XPL.    Current VS 6/4 at 4pm: T 98.0F, P 80, RR 80, /55.    CXR 6/2: Large right pleural effusion causing compressive atelectasis of most of the right lung. Left lung is clear.The trachea appears severely narrowed and the right mainstem bronchus is not well seen, raising the possibility of a mediastinal mass.     Overview/Hospital Course:  Patient transferred for further hepatology eval and persistent R sided pleural effusion. Hepatology recommending TJ biopsy with portal pressures, done by IR 6/8, results pending. Per pulm, NTD for pleural effusion as patient's work of breathing has improved.     Interval History: No acute events overnight. Patient unhappy with level of sedation during biopsy this morning. Treated with lasix and albumin yesterday evening. Patient reports good UOP but not much difference in overall edema. Some improvement on exam of calves.     Review of Systems  Objective:     Vital Signs (Most Recent):  Temp: 98.8 °F (37.1 °C) (06/08/25 0432)  Pulse: 84 (06/08/25 0849)  Resp: 12 (06/08/25 0849)  BP: 116/61 (06/08/25 0849)  SpO2: 100 % (06/08/25 0849) Vital Signs (24h Range):  Temp:  [98.7 °F (37.1 °C)-99.1 °F (37.3 °C)] 98.8 °F (37.1 °C)  Pulse:  [84-96] 84  Resp:  [12-20] 12  SpO2:  [93 %-100 %] 100 %  BP: (116-128)/(43-68) 116/61     Weight: 68.6 kg (151 lb 5.5 oz)  Body mass index is 24.43 kg/m².    Intake/Output Summary (Last 24 hours) at 6/8/2025 1021  Last data filed at 6/7/2025 2356  Gross per 24 hour   Intake 678.08 ml   Output 600 ml   Net 78.08 ml      Physical Exam  Vitals and nursing note reviewed.   Constitutional:       General: He is not in acute distress.     Appearance: He is ill-appearing.    HENT:      Head: Normocephalic.   Eyes:      General: Scleral icterus present.   Cardiovascular:      Rate and Rhythm: Regular rhythm. Tachycardia present.      Heart sounds: Normal heart sounds. No murmur heard.  Pulmonary:      Breath sounds: Examination of the right-lower field reveals decreased breath sounds. Decreased breath sounds present. No wheezing, rhonchi or rales.   Abdominal:      General: There is no distension.      Palpations: Abdomen is soft.      Tenderness: There is abdominal tenderness.   Musculoskeletal:      Right lower le+ Pitting Edema present.      Left lower le+ Pitting Edema present.      Comments: BLE edema to 2 inches below knee   Skin:     Coloration: Skin is jaundiced.   Neurological:      General: No focal deficit present.      Mental Status: He is alert and oriented to person, place, and time.   Psychiatric:         Mood and Affect: Mood normal.         Behavior: Behavior normal.         MELD 3.0: 23 at 2025  4:30 AM  MELD-Na: 21 at 2025  4:30 AM  Calculated from:  Serum Creatinine: 1.5 mg/dL at 2025  4:30 AM  Serum Sodium: 134 mmol/L at 2025  4:30 AM  Total Bilirubin: 5.1 mg/dL at 2025  4:30 AM  Serum Albumin: 2.1 g/dL at 2025  4:30 AM  INR(ratio): 1.3 at 2025  4:30 AM  Age at listing (hypothetical): 34 years  Sex: Male at 2025  4:30 AM      Significant Labs:  CBC:  Recent Labs   Lab 25  1635 25  0146 25  0430   WBC 9.13 6.34 4.56   HGB 10.4* 8.5* 8.5*   HCT 30.6* 25.5* 24.4*   * 88* 74*     CMP:  Recent Labs   Lab 25  1635 25  0146 25  0430   * 131* 134*   K 3.1* 3.4* 2.9*   CL 94* 99 94*   CO2 24 24 29   GLU 93 115* 108   BUN 28* 27* 27*   CREATININE 1.7* 1.5* 1.5*   CALCIUM 8.0* 7.6* 7.8*   PROT 6.8 5.7* 5.7*   ALBUMIN 2.2* 1.8* 2.1*   BILITOT 7.1* 5.8* 5.1*   ALKPHOS 179* 124 141   AST 62* 55* 53*   ALT 26 24 22   ANIONGAP 13 8 11     PTINR:  Recent Labs   Lab 25  1635 25  0705  06/08/25  0430   INR 1.2 1.3* 1.3*       Significant Procedures:   Dobutamine Stress Test with Color Flow: No results found. However, due to the size of the patient record, not all encounters were searched. Please check Results Review for a complete set of results.    None      Assessment & Plan  Pleural effusion    Presented with new onset SOB at OSH. Found to have Large right pleural effusion causing compressive atelectasis of most of the right lung on 6/2 CXR. Underwent thora  Right US guided thoracentesis on 6/3 with removal of 1000 cc of reddish-brown otherwise nonturbid fluid. Thora fluid studies: Gluc 91, , pH 8, Prot 3.5,  with 15% PMN, RBC 20,6000.   Studies consistent with transudative effusion. Likely from ascites   CT chest with enlarged effusion compared to prior studies at this hospital  - pulm consult for repeat thora deferred as patient symptomatically at baseline, pending CT chest results and/or clinical deterioration > not recommended at this time as patient's respiratory status is stable.   - follow up cytology reported sent at Osh     Patient found to have large pleural effusion on imaging. I have personally reviewed and interpreted the following imaging: Xray. A thoracentesis was performed. Pleural fluid was sent for analysis. Labs reviewed, including see abve. Fluid most consistent with Transudate.  . Most likely etiology includes Cirrhosis. Management to include Diuresis    History of liver transplant  Hyperbilirubinemia  Immunosuppression  Liver tx in 2017 2/2 Etoh cirrhosis. Follows with Dr. Morgan. Hx of anastomotic stricture requiring dilatation and stenting. Had been having uptrending bilirubin and MRCP ordered outpatient, done at O[facility] as outpatient. Admitted to Osh with Tbili of 8, ascites, transudative pleural effusion.   - RUQ US done  - f/u MRCP results from OSH   - continue sirolimus 1mg BID  - hepatology consulted > TJ liver biopsy with portal pressures  - valaclovir  PPx continued     Ascites  Lower extremity edema  Started on Lasix 80 IV daily, metolazone, spironolactone 100mg PO daily  at OSH with minor improvement in edema. NOT given here.   S/p 25mg albumin and lasix 80mg IV evening of 6/7 with some improvement     Acute kidney injury superimposed on CKD  Mostly resolved at this point.    Hx of LAURA on CKD at OSH with cr rising to 2.5. Most recently before transfer was 1.69. baseline ~ 1.6-1.7. Started on Lasix 80 IV daily, spironolactone 100mg PO daily  at OSH with minor improvement in edema.     - monitor UOP  - consult nephrology     LAURA is likely due to unclear. Baseline creatinine is 1.6-1.7. Most recent creatinine and eGFR are listed below.  Recent Labs     06/06/25  1635 06/07/25  0146 06/08/25  0430   CREATININE 1.7* 1.5* 1.5*   EGFRNORACEVR 53* >60 >60      Plan  - LAURA is stable  - Avoid nephrotoxins and renally dose meds for GFR listed above  - Monitor urine output, serial BMP, and adjust therapy as needed  Bacteremia (Resolved: 6/8/2025)  2/2 blood cultures positive for GPC at OSH. Microbiology results not in transfer packet. No TTE or BRADEN done. Started on vanc/cftx at OSH on 6/2, continued on admit. ID consulted, cultures likely contaminant. Abx stopped. 6/7.    Opioid dependence  Chronic pain  Follows with pain hospitalist. Takes 30mg oxycodone QID   - continue oxycodone 30mg q6h PRN to avoid withdrawals while inpatient     Hypokalemia  Patient's most recent potassium results are listed below.   Recent Labs     06/06/25  1635 06/07/25  0146 06/08/25  0430   K 3.1* 3.4* 2.9*     Plan  - Replete potassium per protocol  - Monitor potassium Daily  - Patient's hypokalemia is stable  Hyponatremia  Hyponatremia is likely due to Cirrhosis. The patient's most recent sodium results are listed below.  Recent Labs     06/06/25  1635 06/07/25  0146 06/08/25  0430   * 131* 134*     Plan  - Monitor sodium Daily.   - Patient hyponatremia is stable  CKD stage 3a, GFR 45-59  ml/min  Creatine stable for now. BMP reviewed- noted Estimated Creatinine Clearance: 62 mL/min (A) (based on SCr of 1.5 mg/dL (H)). according to latest data. Based on current GFR, CKD stage is stage 2 - GFR 60-89.  Monitor UOP and serial BMP and adjust therapy as needed. Renally dose meds. Avoid nephrotoxic medications and procedures.  VTE Risk Mitigation (From admission, onward)           Ordered     IP VTE LOW RISK PATIENT  Once         06/06/25 1604     Place sequential compression device  Until discontinued         06/06/25 1604                    Discharge Planning   DERICK: 6/10/2025     Code Status: Full Code   Medical Readiness for Discharge Date:                            Debo Fuentes MD  Department of Hospital Medicine   [facility] - Transplant Stepdown

## 2025-06-08 NOTE — PROCEDURES
Radiology Post-Procedure Note    Pre Op Diagnosis: Liver transplant  Post Op Diagnosis: Same    Procedure: Transjugular liver biopsy with portosystemic manometry    Procedure performed by: Devante Rangel MD    Written Informed Consent Obtained: Yes  Specimen Removed: YES 4 TJLB samples placed in formalin  Estimated Blood Loss: Minimal    Findings:   No evidence of IVC or hepatic vein stenosis.     Manometry as follows (mmHg):  Intrahepatic IVC 4  RA 4  Free hepatic 12  Wedged hepatic 23    Patient tolerated procedure well.    Devante Rangel MD  Interventional Radiology  Department of Radiology

## 2025-06-08 NOTE — ASSESSMENT & PLAN NOTE
2/2 blood cultures positive for GPC at OSH. Microbiology results not in transfer packet. No TTE or BRADEN done. Started on vanc/cftx at OSH on 6/2, continued on admit. ID consulted, cultures likely contaminant. Abx stopped. 6/7.

## 2025-06-08 NOTE — ASSESSMENT & PLAN NOTE
Creatine stable for now. BMP reviewed- noted Estimated Creatinine Clearance: 62 mL/min (A) (based on SCr of 1.5 mg/dL (H)). according to latest data. Based on current GFR, CKD stage is stage 2 - GFR 60-89.  Monitor UOP and serial BMP and adjust therapy as needed. Renally dose meds. Avoid nephrotoxic medications and procedures.

## 2025-06-08 NOTE — PLAN OF CARE
Plan of care reviewed with pt. Pt voiced understanding. Pt AAO X 4. Pt c/o pain during the night. No apparent distress noted. Bed in lowest position, locked, call light within reach. Side rails up x's 2 with slip resistant socks on. Pt kept NPO except for meds.   Problem: Adult Inpatient Plan of Care  Goal: Plan of Care Review  Outcome: Progressing  Flowsheets (Taken 6/8/2025 9935)  Plan of Care Reviewed With:   patient   parent  Goal: Absence of Hospital-Acquired Illness or Injury  Outcome: Progressing  Goal: Optimal Comfort and Wellbeing  Outcome: Progressing     Problem: Acute Kidney Injury/Impairment  Goal: Fluid and Electrolyte Balance  Outcome: Progressing  Goal: Improved Oral Intake  Outcome: Progressing  Goal: Effective Renal Function  Outcome: Progressing     Problem: Infection  Goal: Absence of Infection Signs and Symptoms  Outcome: Progressing     Problem: Fall Injury Risk  Goal: Absence of Fall and Fall-Related Injury  Outcome: Progressing     Problem: Liver Failure  Goal: Optimal Coping with Liver Failure  Outcome: Progressing  Goal: Fluid and Electrolyte Balance  Outcome: Progressing  Goal: Optimal Gastrointestinal Function  Outcome: Progressing  Goal: Blood Glucose Level Within Target Range  Outcome: Progressing  Goal: Optimal Coagulation Function  Outcome: Progressing  Goal: Absence of Infection Signs and Symptoms  Outcome: Progressing  Goal: Optimal Neurologic Function  Outcome: Progressing  Goal: Improved Oral Intake  Outcome: Progressing  Goal: Optimal Pain Control, Comfort and Function  Outcome: Progressing  Goal: Optimize Renal Function  Outcome: Progressing  Goal: Effective Oxygenation and Ventilation  Outcome: Progressing     Problem: Pain Acute  Goal: Optimal Pain Control and Function  Outcome: Progressing

## 2025-06-08 NOTE — ASSESSMENT & PLAN NOTE
Started on Lasix 80 IV daily, metolazone, spironolactone 100mg PO daily  at OSH with minor improvement in edema. NOT given here.   S/p 25mg albumin and lasix 80mg IV evening of 6/7 with some improvement

## 2025-06-08 NOTE — SUBJECTIVE & OBJECTIVE
Interval History: No acute events overnight. Patient unhappy with level of sedation during biopsy this morning. Treated with lasix and albumin yesterday evening. Patient reports good UOP but not much difference in overall edema. Some improvement on exam of calves.     Review of Systems  Objective:     Vital Signs (Most Recent):  Temp: 98.8 °F (37.1 °C) (25 0432)  Pulse: 84 (25 08)  Resp: 12 (25)  BP: 116/61 (25)  SpO2: 100 % (25) Vital Signs (24h Range):  Temp:  [98.7 °F (37.1 °C)-99.1 °F (37.3 °C)] 98.8 °F (37.1 °C)  Pulse:  [84-96] 84  Resp:  [12-20] 12  SpO2:  [93 %-100 %] 100 %  BP: (116-128)/(43-68) 116/61     Weight: 68.6 kg (151 lb 5.5 oz)  Body mass index is 24.43 kg/m².    Intake/Output Summary (Last 24 hours) at 2025 1021  Last data filed at 2025 2356  Gross per 24 hour   Intake 678.08 ml   Output 600 ml   Net 78.08 ml      Physical Exam  Vitals and nursing note reviewed.   Constitutional:       General: He is not in acute distress.     Appearance: He is ill-appearing.   HENT:      Head: Normocephalic.   Eyes:      General: Scleral icterus present.   Cardiovascular:      Rate and Rhythm: Regular rhythm. Tachycardia present.      Heart sounds: Normal heart sounds. No murmur heard.  Pulmonary:      Breath sounds: Examination of the right-lower field reveals decreased breath sounds. Decreased breath sounds present. No wheezing, rhonchi or rales.   Abdominal:      General: There is no distension.      Palpations: Abdomen is soft.      Tenderness: There is abdominal tenderness.   Musculoskeletal:      Right lower le+ Pitting Edema present.      Left lower le+ Pitting Edema present.      Comments: BLE edema to 2 inches below knee   Skin:     Coloration: Skin is jaundiced.   Neurological:      General: No focal deficit present.      Mental Status: He is alert and oriented to person, place, and time.   Psychiatric:         Mood and Affect: Mood normal.          Behavior: Behavior normal.         MELD 3.0: 23 at 6/8/2025  4:30 AM  MELD-Na: 21 at 6/8/2025  4:30 AM  Calculated from:  Serum Creatinine: 1.5 mg/dL at 6/8/2025  4:30 AM  Serum Sodium: 134 mmol/L at 6/8/2025  4:30 AM  Total Bilirubin: 5.1 mg/dL at 6/8/2025  4:30 AM  Serum Albumin: 2.1 g/dL at 6/8/2025  4:30 AM  INR(ratio): 1.3 at 6/8/2025  4:30 AM  Age at listing (hypothetical): 34 years  Sex: Male at 6/8/2025  4:30 AM      Significant Labs:  CBC:  Recent Labs   Lab 06/06/25 1635 06/07/25  0146 06/08/25  0430   WBC 9.13 6.34 4.56   HGB 10.4* 8.5* 8.5*   HCT 30.6* 25.5* 24.4*   * 88* 74*     CMP:  Recent Labs   Lab 06/06/25 1635 06/07/25  0146 06/08/25  0430   * 131* 134*   K 3.1* 3.4* 2.9*   CL 94* 99 94*   CO2 24 24 29   GLU 93 115* 108   BUN 28* 27* 27*   CREATININE 1.7* 1.5* 1.5*   CALCIUM 8.0* 7.6* 7.8*   PROT 6.8 5.7* 5.7*   ALBUMIN 2.2* 1.8* 2.1*   BILITOT 7.1* 5.8* 5.1*   ALKPHOS 179* 124 141   AST 62* 55* 53*   ALT 26 24 22   ANIONGAP 13 8 11     PTINR:  Recent Labs   Lab 06/06/25 1635 06/07/25  0705 06/08/25  0430   INR 1.2 1.3* 1.3*       Significant Procedures:   Dobutamine Stress Test with Color Flow: No results found. However, due to the size of the patient record, not all encounters were searched. Please check Results Review for a complete set of results.    None

## 2025-06-08 NOTE — ASSESSMENT & PLAN NOTE
Presented with new onset SOB at OSH. Found to have Large right pleural effusion causing compressive atelectasis of most of the right lung on 6/2 CXR. Underwent thora  Right US guided thoracentesis on 6/3 with removal of 1000 cc of reddish-brown otherwise nonturbid fluid. Thora fluid studies: Gluc 91, , pH 8, Prot 3.5,  with 15% PMN, RBC 20,6000.   Studies consistent with transudative effusion. Likely from ascites   CT chest with enlarged effusion compared to prior studies at this hospital  - pulm consult for repeat thora deferred as patient symptomatically at baseline, pending CT chest results and/or clinical deterioration > not recommended at this time as patient's respiratory status is stable.   - follow up cytology reported sent at [facility]     Patient found to have large pleural effusion on imaging. I have personally reviewed and interpreted the following imaging: Xray. A thoracentesis was performed. Pleural fluid was sent for analysis. Labs reviewed, including see abve. Fluid most consistent with Transudate.  . Most likely etiology includes Cirrhosis. Management to include Diuresis

## 2025-06-08 NOTE — ASSESSMENT & PLAN NOTE
Patient's most recent potassium results are listed below.   Recent Labs     06/06/25  1635 06/07/25  0146 06/08/25  0430   K 3.1* 3.4* 2.9*     Plan  - Replete potassium per protocol  - Monitor potassium Daily  - Patient's hypokalemia is stable

## 2025-06-09 LAB
ABSOLUTE EOSINOPHIL (OHS): 0.43 K/UL
ABSOLUTE EOSINOPHIL (OHS): 0.69 K/UL
ABSOLUTE MONOCYTE (OHS): 0.8 K/UL (ref 0.3–1)
ABSOLUTE MONOCYTE (OHS): 1.11 K/UL (ref 0.3–1)
ABSOLUTE NEUTROPHIL COUNT (OHS): 3.65 K/UL (ref 1.8–7.7)
ABSOLUTE NEUTROPHIL COUNT (OHS): 4.22 K/UL (ref 1.8–7.7)
ALBUMIN FLD-MCNC: 1.5 G/DL
ALBUMIN SERPL BCP-MCNC: 2.2 G/DL (ref 3.5–5.2)
ALBUMIN SERPL BCP-MCNC: 2.3 G/DL (ref 3.5–5.2)
ALP SERPL-CCNC: 134 UNIT/L (ref 40–150)
ALP SERPL-CCNC: 141 UNIT/L (ref 40–150)
ALT SERPL W/O P-5'-P-CCNC: 26 UNIT/L (ref 10–44)
ALT SERPL W/O P-5'-P-CCNC: 26 UNIT/L (ref 10–44)
ANION GAP (OHS): 11 MMOL/L (ref 8–16)
ANION GAP (OHS): 9 MMOL/L (ref 8–16)
APPEARANCE FLD: NORMAL
AST SERPL-CCNC: 60 UNIT/L (ref 11–45)
AST SERPL-CCNC: 63 UNIT/L (ref 11–45)
BASOPHILS # BLD AUTO: 0.05 K/UL
BASOPHILS # BLD AUTO: 0.06 K/UL
BASOPHILS NFR BLD AUTO: 0.7 %
BASOPHILS NFR BLD AUTO: 0.8 %
BILIRUB SERPL-MCNC: 6.6 MG/DL (ref 0.1–1)
BILIRUB SERPL-MCNC: 7.3 MG/DL (ref 0.1–1)
BILIRUB UR QL STRIP.AUTO: NEGATIVE
BUN SERPL-MCNC: 29 MG/DL (ref 6–20)
BUN SERPL-MCNC: 29 MG/DL (ref 6–20)
CALCIUM SERPL-MCNC: 8.1 MG/DL (ref 8.7–10.5)
CALCIUM SERPL-MCNC: 8.1 MG/DL (ref 8.7–10.5)
CHLORIDE SERPL-SCNC: 95 MMOL/L (ref 95–110)
CHLORIDE SERPL-SCNC: 96 MMOL/L (ref 95–110)
CLARITY UR: CLEAR
CO2 SERPL-SCNC: 28 MMOL/L (ref 23–29)
CO2 SERPL-SCNC: 28 MMOL/L (ref 23–29)
COLOR FLD: NORMAL
COLOR UR AUTO: YELLOW
CREAT SERPL-MCNC: 1.6 MG/DL (ref 0.5–1.4)
CREAT SERPL-MCNC: 1.7 MG/DL (ref 0.5–1.4)
CYTOMEGALOVIRUS DNA, QUAL (OHS): NOT DETECTED
EOSINOPHIL NFR FLD MANUAL: 7 %
ERYTHROCYTE [DISTWIDTH] IN BLOOD BY AUTOMATED COUNT: 13.7 % (ref 11.5–14.5)
ERYTHROCYTE [DISTWIDTH] IN BLOOD BY AUTOMATED COUNT: 13.7 % (ref 11.5–14.5)
GFR SERPLBLD CREATININE-BSD FMLA CKD-EPI: 53 ML/MIN/1.73/M2
GFR SERPLBLD CREATININE-BSD FMLA CKD-EPI: 57 ML/MIN/1.73/M2
GLUCOSE FLD-MCNC: 100 MG/DL
GLUCOSE SERPL-MCNC: 109 MG/DL (ref 70–110)
GLUCOSE SERPL-MCNC: 136 MG/DL (ref 70–110)
GLUCOSE UR QL STRIP: NEGATIVE
GRAM STN SPEC: NORMAL
GRAM STN SPEC: NORMAL
HCT VFR BLD AUTO: 27.5 % (ref 40–54)
HCT VFR BLD AUTO: 29.5 % (ref 40–54)
HGB BLD-MCNC: 9.4 GM/DL (ref 14–18)
HGB BLD-MCNC: 9.7 GM/DL (ref 14–18)
HGB UR QL STRIP: NEGATIVE
HOLD SPECIMEN: NORMAL
IMM GRANULOCYTES # BLD AUTO: 0.04 K/UL (ref 0–0.04)
IMM GRANULOCYTES # BLD AUTO: 0.04 K/UL (ref 0–0.04)
IMM GRANULOCYTES NFR BLD AUTO: 0.5 % (ref 0–0.5)
IMM GRANULOCYTES NFR BLD AUTO: 0.6 % (ref 0–0.5)
INR PPP: 1.3 (ref 0.8–1.2)
KETONES UR QL STRIP: NEGATIVE
LACTATE SERPL-SCNC: 1.4 MMOL/L (ref 0.5–2.2)
LDH FLD L TO P-CCNC: 124 U/L
LEUKOCYTE ESTERASE UR QL STRIP: NEGATIVE
LYMPHOCYTES # BLD AUTO: 1.43 K/UL (ref 1–4.8)
LYMPHOCYTES # BLD AUTO: 2.02 K/UL (ref 1–4.8)
LYMPHOCYTES NFR FLD MANUAL: 7 %
MAGNESIUM SERPL-MCNC: 1.8 MG/DL (ref 1.6–2.6)
MCH RBC QN AUTO: 32.6 PG (ref 27–31)
MCH RBC QN AUTO: 33.6 PG (ref 27–31)
MCHC RBC AUTO-ENTMCNC: 32.9 G/DL (ref 32–36)
MCHC RBC AUTO-ENTMCNC: 34.2 G/DL (ref 32–36)
MCV RBC AUTO: 98 FL (ref 82–98)
MCV RBC AUTO: 99 FL (ref 82–98)
MONOS+MACROS NFR FLD MANUAL: 80 %
NEUTROPHILS NFR FLD MANUAL: 7 %
NITRITE UR QL STRIP: NEGATIVE
NUCLEATED RBC (/100WBC) (OHS): 0 /100 WBC
NUCLEATED RBC (/100WBC) (OHS): 0 /100 WBC
PH UR STRIP: 6 [PH]
PHOSPHATE SERPL-MCNC: 2.3 MG/DL (ref 2.7–4.5)
PLATELET # BLD AUTO: 104 K/UL (ref 150–450)
PLATELET # BLD AUTO: 111 K/UL (ref 150–450)
PLATELET BLD QL SMEAR: ABNORMAL
PMV BLD AUTO: 9.1 FL (ref 9.2–12.9)
PMV BLD AUTO: 9.1 FL (ref 9.2–12.9)
POTASSIUM SERPL-SCNC: 3.4 MMOL/L (ref 3.5–5.1)
POTASSIUM SERPL-SCNC: 3.5 MMOL/L (ref 3.5–5.1)
PROT FLD-MCNC: 3.5 G/DL
PROT SERPL-MCNC: 6.4 GM/DL (ref 6–8.4)
PROT SERPL-MCNC: 6.4 GM/DL (ref 6–8.4)
PROT UR QL STRIP: NEGATIVE
PROTHROMBIN TIME: 13.5 SECONDS (ref 9–12.5)
RBC # BLD AUTO: 2.8 M/UL (ref 4.6–6.2)
RBC # BLD AUTO: 2.98 M/UL (ref 4.6–6.2)
RELATIVE EOSINOPHIL (OHS): 6.7 %
RELATIVE EOSINOPHIL (OHS): 8.5 %
RELATIVE LYMPHOCYTE (OHS): 22.3 % (ref 18–48)
RELATIVE LYMPHOCYTE (OHS): 24.8 % (ref 18–48)
RELATIVE MONOCYTE (OHS): 12.5 % (ref 4–15)
RELATIVE MONOCYTE (OHS): 13.6 % (ref 4–15)
RELATIVE NEUTROPHIL (OHS): 51.9 % (ref 38–73)
RELATIVE NEUTROPHIL (OHS): 57.1 % (ref 38–73)
SIROLIMUS BLD-MCNC: 8.7 NG/ML (ref 4–20)
SODIUM SERPL-SCNC: 133 MMOL/L (ref 136–145)
SODIUM SERPL-SCNC: 134 MMOL/L (ref 136–145)
SP GR UR STRIP: 1.01
UROBILINOGEN UR STRIP-ACNC: ABNORMAL EU/DL
VANCOMYCIN SERPL-MCNC: 13 UG/ML (ref ?–80)
WBC # BLD AUTO: 6.4 K/UL (ref 3.9–12.7)
WBC # BLD AUTO: 8.14 K/UL (ref 3.9–12.7)
WBC # FLD: 30 /CU MM

## 2025-06-09 PROCEDURE — 36415 COLL VENOUS BLD VENIPUNCTURE: CPT | Performed by: HOSPITALIST

## 2025-06-09 PROCEDURE — 80053 COMPREHEN METABOLIC PANEL: CPT

## 2025-06-09 PROCEDURE — 0W993ZZ DRAINAGE OF RIGHT PLEURAL CAVITY, PERCUTANEOUS APPROACH: ICD-10-PCS | Performed by: STUDENT IN AN ORGANIZED HEALTH CARE EDUCATION/TRAINING PROGRAM

## 2025-06-09 PROCEDURE — 87205 SMEAR GRAM STAIN: CPT | Performed by: STUDENT IN AN ORGANIZED HEALTH CARE EDUCATION/TRAINING PROGRAM

## 2025-06-09 PROCEDURE — 36415 COLL VENOUS BLD VENIPUNCTURE: CPT

## 2025-06-09 PROCEDURE — 87102 FUNGUS ISOLATION CULTURE: CPT | Performed by: STUDENT IN AN ORGANIZED HEALTH CARE EDUCATION/TRAINING PROGRAM

## 2025-06-09 PROCEDURE — 88305 TISSUE EXAM BY PATHOLOGIST: CPT | Mod: 26,,, | Performed by: PATHOLOGY

## 2025-06-09 PROCEDURE — 99232 SBSQ HOSP IP/OBS MODERATE 35: CPT | Mod: GC,,, | Performed by: INTERNAL MEDICINE

## 2025-06-09 PROCEDURE — 63600175 PHARM REV CODE 636 W HCPCS: Mod: UD | Performed by: STUDENT IN AN ORGANIZED HEALTH CARE EDUCATION/TRAINING PROGRAM

## 2025-06-09 PROCEDURE — 81003 URINALYSIS AUTO W/O SCOPE: CPT | Performed by: HOSPITALIST

## 2025-06-09 PROCEDURE — 82945 GLUCOSE OTHER FLUID: CPT | Performed by: STUDENT IN AN ORGANIZED HEALTH CARE EDUCATION/TRAINING PROGRAM

## 2025-06-09 PROCEDURE — 85025 COMPLETE CBC W/AUTO DIFF WBC: CPT | Performed by: HOSPITALIST

## 2025-06-09 PROCEDURE — 88112 CYTOPATH CELL ENHANCE TECH: CPT | Mod: 26,,, | Performed by: PATHOLOGY

## 2025-06-09 PROCEDURE — 80053 COMPREHEN METABOLIC PANEL: CPT | Performed by: HOSPITALIST

## 2025-06-09 PROCEDURE — 87206 SMEAR FLUORESCENT/ACID STAI: CPT | Performed by: STUDENT IN AN ORGANIZED HEALTH CARE EDUCATION/TRAINING PROGRAM

## 2025-06-09 PROCEDURE — 25000003 PHARM REV CODE 250

## 2025-06-09 PROCEDURE — 85610 PROTHROMBIN TIME: CPT

## 2025-06-09 PROCEDURE — 99232 SBSQ HOSP IP/OBS MODERATE 35: CPT | Mod: ,,, | Performed by: INTERNAL MEDICINE

## 2025-06-09 PROCEDURE — 80195 ASSAY OF SIROLIMUS: CPT | Performed by: STUDENT IN AN ORGANIZED HEALTH CARE EDUCATION/TRAINING PROGRAM

## 2025-06-09 PROCEDURE — 84100 ASSAY OF PHOSPHORUS: CPT

## 2025-06-09 PROCEDURE — 89051 BODY FLUID CELL COUNT: CPT | Performed by: STUDENT IN AN ORGANIZED HEALTH CARE EDUCATION/TRAINING PROGRAM

## 2025-06-09 PROCEDURE — 83605 ASSAY OF LACTIC ACID: CPT | Performed by: HOSPITALIST

## 2025-06-09 PROCEDURE — 80202 ASSAY OF VANCOMYCIN: CPT | Performed by: STUDENT IN AN ORGANIZED HEALTH CARE EDUCATION/TRAINING PROGRAM

## 2025-06-09 PROCEDURE — 82042 OTHER SOURCE ALBUMIN QUAN EA: CPT | Performed by: STUDENT IN AN ORGANIZED HEALTH CARE EDUCATION/TRAINING PROGRAM

## 2025-06-09 PROCEDURE — 63600175 PHARM REV CODE 636 W HCPCS

## 2025-06-09 PROCEDURE — 25000003 PHARM REV CODE 250: Performed by: HOSPITALIST

## 2025-06-09 PROCEDURE — 87070 CULTURE OTHR SPECIMN AEROBIC: CPT | Performed by: STUDENT IN AN ORGANIZED HEALTH CARE EDUCATION/TRAINING PROGRAM

## 2025-06-09 PROCEDURE — 87040 BLOOD CULTURE FOR BACTERIA: CPT | Performed by: HOSPITALIST

## 2025-06-09 PROCEDURE — 83735 ASSAY OF MAGNESIUM: CPT

## 2025-06-09 PROCEDURE — 85025 COMPLETE CBC W/AUTO DIFF WBC: CPT

## 2025-06-09 PROCEDURE — 25000003 PHARM REV CODE 250: Performed by: STUDENT IN AN ORGANIZED HEALTH CARE EDUCATION/TRAINING PROGRAM

## 2025-06-09 PROCEDURE — 20600001 HC STEP DOWN PRIVATE ROOM

## 2025-06-09 PROCEDURE — 83615 LACTATE (LD) (LDH) ENZYME: CPT | Performed by: STUDENT IN AN ORGANIZED HEALTH CARE EDUCATION/TRAINING PROGRAM

## 2025-06-09 PROCEDURE — 63600175 PHARM REV CODE 636 W HCPCS: Performed by: HOSPITALIST

## 2025-06-09 PROCEDURE — 87015 SPECIMEN INFECT AGNT CONCNTJ: CPT | Performed by: STUDENT IN AN ORGANIZED HEALTH CARE EDUCATION/TRAINING PROGRAM

## 2025-06-09 PROCEDURE — 88305 TISSUE EXAM BY PATHOLOGIST: CPT | Mod: TC | Performed by: STUDENT IN AN ORGANIZED HEALTH CARE EDUCATION/TRAINING PROGRAM

## 2025-06-09 PROCEDURE — 84157 ASSAY OF PROTEIN OTHER: CPT | Performed by: STUDENT IN AN ORGANIZED HEALTH CARE EDUCATION/TRAINING PROGRAM

## 2025-06-09 RX ORDER — LORAZEPAM 2 MG/ML
0.5 INJECTION INTRAMUSCULAR ONCE
Status: COMPLETED | OUTPATIENT
Start: 2025-06-09 | End: 2025-06-09

## 2025-06-09 RX ORDER — POTASSIUM CHLORIDE 750 MG/1
10 CAPSULE, EXTENDED RELEASE ORAL ONCE
Status: COMPLETED | OUTPATIENT
Start: 2025-06-09 | End: 2025-06-09

## 2025-06-09 RX ADMIN — POTASSIUM CHLORIDE 10 MEQ: 750 CAPSULE, EXTENDED RELEASE ORAL at 09:06

## 2025-06-09 RX ADMIN — VANCOMYCIN HYDROCHLORIDE 1000 MG: 1 INJECTION, POWDER, LYOPHILIZED, FOR SOLUTION INTRAVENOUS at 06:06

## 2025-06-09 RX ADMIN — OXYCODONE HYDROCHLORIDE 30 MG: 10 TABLET ORAL at 06:06

## 2025-06-09 RX ADMIN — PIPERACILLIN SODIUM AND TAZOBACTAM SODIUM 4.5 G: 4; .5 INJECTION, POWDER, FOR SOLUTION INTRAVENOUS at 12:06

## 2025-06-09 RX ADMIN — MUPIROCIN: 20 OINTMENT TOPICAL at 09:06

## 2025-06-09 RX ADMIN — OXYCODONE HYDROCHLORIDE 30 MG: 10 TABLET ORAL at 12:06

## 2025-06-09 RX ADMIN — LORAZEPAM 0.5 MG: 2 INJECTION INTRAMUSCULAR; INTRAVENOUS at 10:06

## 2025-06-09 RX ADMIN — DIAZEPAM 5 MG: 5 TABLET ORAL at 09:06

## 2025-06-09 RX ADMIN — SIROLIMUS 1 MG: 1 TABLET ORAL at 08:06

## 2025-06-09 RX ADMIN — PIPERACILLIN SODIUM AND TAZOBACTAM SODIUM 4.5 G: 4; .5 INJECTION, POWDER, FOR SOLUTION INTRAVENOUS at 04:06

## 2025-06-09 RX ADMIN — PIPERACILLIN SODIUM AND TAZOBACTAM SODIUM 4.5 G: 4; .5 INJECTION, POWDER, FOR SOLUTION INTRAVENOUS at 08:06

## 2025-06-09 NOTE — PROGRESS NOTES
[facility] - Medical / Clinical  Nephrology  Progress Note      Patient Name: Jhonny Diana   MRN: [id number]   Current Provider: Brian Ball DO  Primary Care Provider: Elo Bull MD   Admission Date: 6/6/2025   Hospital Day: 3  Bed: 75323/38543 A  Principal Problem: History of liver transplant       SUBJECTIVE     Mr. 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 His post-transplant period was notable for LAURA previously on HD, anastomotic stricture requiring dilatation and stenting, and ETOH relapse (6/2019).     He was recently admitted to [facility] in [place] on 6/2 with SOB, jaundice, and anasarca. He was ultimately found to have a large right pleural effusion. He had a right US guided thoracentesis on 6/3 with removal of 1000 cc of reddish-brown otherwise nonturbid fluid. Thora fluid studies: Gluc 91, , pH 8, Prot 3.5,  with 15% PMN, RBC 20,6000.    Nephrology consulted for elevated serum creatinine and edema. Review of his outpatient labs show a baseline serum creatinine of 1.5-1.7 range, currently at 1.5 at the time of this consult. Patient reports noticing increasing lower extremity edema starting on 5/16, and has progressed so severely that he cannot put on compression stockings or shoes. He does have a low albumin level of 1.8 on serum chemistry. Since the May 16th he has noticed that it has been increasingly more difficult to pass urine and thinks that he has been producing less urine. Due to his increased dyspnea and pleural effusion, he was not eating or drinking the the week leading up to admission.     Blood cultures 2/2 turned positive for GPC. He was started on Vancomycin and Rocephin 2g iv daily. Current VS 6/4 at 4pm: T 98.0F, P 80, RR 80, /55.     INTERVAL HISTORY:  Today he is  "feeling well. Stated that the BL leg edema has been improving, initially it was upto the hips but now it is below the knees.     Denied SOB. No issue with the urine.    OBJECTIVE     Vitals:  /67   Pulse 90   Temp 98.7 °F (37.1 °C) (Oral)   Resp 18   Ht 5' 6" (1.676 m)   Wt 66.8 kg (147 lb 6 oz)   SpO2 (!) 92%   BMI 23.79 kg/m²    I/O last 3 completed shifts:  In: 1118.1 [P.O.:1040; I.V.:78.1]  Out: 2400 [Urine:2400]   Net IO Since Admission: -1,781.92 mL [06/09/25 1817]    Physical Examination:  Constitutional: Alert and oriented. On RA  HEENT: Atraumatic  Chest:  Chest is clear BL. No Crackles. No wheezes  Cardiovascular:  S1+S2+0. Regular. No murmur  Abdominal: SNT, ND  Extremities: Bilateral lower extremity edema below knees.    MEDICATIONS & LABS         diazePAM  5 mg Oral QHS    mupirocin   Nasal BID    piperacillin-tazobactam (Zosyn) IV (PEDS and ADULTS) (extended infusion is not appropriate)  4.5 g Intravenous Q8H    sirolimus  1 mg Oral Daily     Current Outpatient Medications   Medication Instructions    diazePAM (VALIUM) 5 mg, Daily    mycophenolate sodium 360 mg, 2 times daily    naloxone (NARCAN) 4 mg/actuation Spry CALL 911. SPR CONTENTS OF ONE SPRAYER (0.1ML) INTO ONE NOSTRIL. REPEAT IN 2-3 MIN IF SYMPTOMS OF OPIOID EMERGENCY PERSIST, ALTERNATE NOSTRILS    oxyCODONE (ROXICODONE) 30 mg, Every 6 hours PRN    sirolimus (RAPAMUNE) 1 mg, Oral, Daily    tiZANidine 4 mg Cap 1 tablet, Nightly    valACYclovir (VALTREX) 500 mg, Daily       Relevant Data:  Trend: No results found for: "CYSTATINCSER"  Trend:   Lab Results   Component Value Date    BUN 29 (H) 06/09/2025    BUN 29 (H) 06/09/2025    BUN 27 (H) 06/08/2025     Trend:     POC HCO3   Date Value Ref Range Status   10/22/2017 29.6 (H) 24 - 28 mmol/L Final   10/22/2017 26.6 24 - 28 mmol/L Final   10/22/2017 23.7 (L) 24 - 28 mmol/L Final     Trend:   Vitals:    06/09/25 1204 06/09/25 1504 06/09/25 1549 06/09/25 1806   BP:   115/67    BP " "Location:       Patient Position:       Pulse:  98 90    Resp: 18  18 18   Temp:   98.7 °F (37.1 °C)    TempSrc:   Oral    SpO2:   (!) 92%    Weight:       Height:           I/O last 3 completed shifts:  In: 1118.1 [P.O.:1040; I.V.:78.1]  Out: 2400 [Urine:2400]   Net IO Since Admission: -1,781.92 mL [06/09/25 1817]  Trend: No results found for: "PHOSPHORUS"  Trend:   Lab Results   Component Value Date    HGB 9.7 (L) 06/09/2025    HGB 9.4 (L) 06/09/2025    HGB 8.5 (L) 06/08/2025         Trend:   Calcium   Date Value Ref Range Status   06/09/2025 8.1 (L) 8.7 - 10.5 mg/dL Final   06/09/2025 8.1 (L) 8.7 - 10.5 mg/dL Final   06/08/2025 7.8 (L) 8.7 - 10.5 mg/dL Final     Albumin   Date Value Ref Range Status   06/09/2025 2.2 (L) 3.5 - 5.2 g/dL Final   06/09/2025 2.3 (L) 3.5 - 5.2 g/dL Final       MEDICAL HISTORY    Past Medical History:  Past Medical History:   Diagnosis Date    Abdominal pain 05/27/2018    Alcoholic cirrhosis 08/14/2018    Alcoholic hepatitis with ascites     Anemia     CKD (chronic kidney disease) stage 3, GFR 30-59 ml/min 08/14/2018    Encounter for blood transfusion     multiple blood transfusions    ESRD on dialysis     Herpes zoster without complication 12/11/2019    History of hematemesis 09/28/2017    Hypertension     Seizures     Substance abuse     alcohol and opioids    Thrombocytopenia     Transplanted liver     10/2017       Past Surgical History:  Past Surgical History:   Procedure Laterality Date    APPENDECTOMY      CENTRAL VENOUS CATHETER TUNNELED INSERTION DOUBLE LUMEN      inserted and removed    ERCP      ERCP N/A 7/2/2018    Procedure: ERCP;  Surgeon: Solis Villanueva MD;  Location: [address];  Service: Endoscopy;  Laterality: N/A;  dialysis/cirrhosis/labs prior to ERCP/svn  platelet    ESOPHAGOGASTRODUODENOSCOPY      ESOPHAGOGASTRODUODENOSCOPY N/A 11/25/2019    Procedure: EGD (ESOPHAGOGASTRODUODENOSCOPY);  Surgeon: Sheri Siddiqui MD;  Location: [facility] (2ND FLR);  " Service: Endoscopy;  Laterality: N/A;    LIVER TRANSPLANT      10/2017    REPAIR, HERNIA, INGUINAL, WITHOUT HISTORY OF PRIOR REPAIR, AGE 5 YEARS OR OLDER Left 11/26/2024    Procedure: REPAIR, HERNIA, INGUINAL, WITHOUT HISTORY OF PRIOR REPAIR, AGE 5 YEARS OR OLDER;  Surgeon: Elian Bazan MD;  Location: [facility] OR [address] [place] [address];  Service: General;  Laterality: Left;       Family History:   Family History   Problem Relation Name Age of Onset    No Known Problems Mother      Cancer Father          Lung cancer    Alcohol abuse Father      No Known Problems Sister 3     No Known Problems Brother 1     Kidney disease Neg Hx        Review of patient's allergies indicates:   Allergen Reactions    Bactrim [sulfamethoxazole-trimethoprim] Other (See Comments)     Mookie Trell Syndrome     Social History[1]         ASSESSMENT AND PLAN:    Jhonny Diana is a 34 y.o. 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    Admitted with SOB, jaundice, and anasarca, found to have bacteremia and large R pleural effusion, thoracentesis done on 6/3, 1L was removed.    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      Impression:  Lower extremity edema  Patient presents with significant lower extremity edema. Appearance of his edema is shiny in appearance and patient with low serum albumin level.   Etiology: Likely 2/2 inflammation/infection (increase capillary permeability) +/- hypoalbuminemia.        Renal/  CKD stage 3a, GFR 45-59 ml/min  Renal function currently at baseline.   -Avoid nephrotoxic agents (IV contrast, NSAID's, gadolinium contrast, PPI's) if able.   -Maintain MAP above 65 mmHg.   -Strict  I's and O's  -Daily renal function panel  -Renally dose all medications'     Hypokalemia  Replace as needed.     Immunosuppression  Management per primary team.       Hyponatremia  Suspect due to his underlying liver disease.  Monitor levels daily.      GI  * History of liver transplant  Management per primary team.    Recommendations :   - Hold Lasix  - Advised leg elevation whenever possible   - Recommend compression stocking but patient is not comfortable with it - can consider pneumatic compression device  - Consider Replenishing K to a target of 3.5. Replenishing K can increase distal Na-K exchange which can increase Na level.    Monitor serum chemistries daily, strict intake and output Qshift , daily weights if able.  Renal protective measures: Please adjust medications for reduced clearance  Avoid nephrotoxic medications (NSAID, IV contrast)  Avoid ACEi and ARBs in the setting of LAURA  Maintain MAP > 65  Transfuse for Hb <7    Thank you for allowing us to participate in the care of this patient.  Plan discussed with attending. Please call with any questions or concerns.           Erasmo Krueger MD.  Clinical Nephrology Fellow, PGY-4  [facility] [address]          [1]   Social History  Socioeconomic History    Marital status: Single   Occupational History     Comment: full time    Tobacco Use    Smoking status: Former     Current packs/day: 0.00     Average packs/day: 1 pack/day for 10.0 years (10.0 ttl pk-yrs)     Types: Cigarettes     Start date: 2007     Quit date: 2017     Years since quittin.3     Passive exposure: Current    Smokeless tobacco: Never    Tobacco comments:     Medical Marijuana smoker.    Substance and Sexual Activity    Alcohol use: No     Comment: a fifth of liquor daily for years, cut back over last 2 months    Drug use: No    Sexual activity: Not Currently     Partners: Male     Comment: single   Social History Narrative    He lives in [facility]  with his roommate      Social Drivers of Health     Financial Resource Strain: Low Risk  (6/8/2025)    Overall Financial Resource Strain (CARDIA)     Difficulty of Paying Living Expenses: Not very hard   Recent Concern: Financial Resource Strain - Medium Risk (6/6/2025)    Overall Financial Resource Strain (CARDIA)     Difficulty of Paying Living Expenses: Somewhat hard   Food Insecurity: No Food Insecurity (6/8/2025)    Hunger Vital Sign     Worried About Running Out of Food in the Last Year: Never true     Ran Out of Food in the Last Year: Never true   Recent Concern: Food Insecurity - Food Insecurity Present (6/6/2025)    Hunger Vital Sign     Worried About Running Out of Food in the Last Year: Sometimes true     Ran Out of Food in the Last Year: Sometimes true   Transportation Needs: No Transportation Needs (6/8/2025)    PRAPARE - Transportation     Lack of Transportation (Medical): No     Lack of Transportation (Non-Medical): No   Recent Concern: Transportation Needs - Unmet Transportation Needs (6/6/2025)    PRAPARE - Transportation     Lack of Transportation (Medical): No     Lack of Transportation (Non-Medical): Yes   Physical Activity: Inactive (6/8/2025)    Exercise Vital Sign     Days of Exercise per Week: 0 days     Minutes of Exercise per Session: 0 min   Stress: Stress Concern Present (6/6/2025)    [employer] - Occupational Stress Questionnaire     Feeling of Stress : To some extent   Housing Stability: Low Risk  (6/8/2025)    Housing Stability Vital Sign     Unable to Pay for Housing in the Last Year: No     Homeless in the Last Year: No

## 2025-06-09 NOTE — NURSING
Pt febrile 101.1F. Overall, pt remains asymptomatic. PRN tylenol administered. Dr. Gillette made aware per order parameters.

## 2025-06-09 NOTE — PLAN OF CARE
Pt AAOx4. Vitals stable. Febrile overnight - refer to previous notes. Fever improved following administration of PRN tylenol. Blood cultures repeated this shift. UA ordered, pt states understanding to collect next urine for testing. Lactate WNL. Zosyn Q8H added. Random vanc 13.0 overnight - pharmacy to dose. Liver biopsy completed 6/8, awaiting results. R neck biopsy site remains CDI. BLE remain edematous, pt reports slight improvement in edema in his knees. PRN oxycodone administered for abdominal pain. Bed in lowest locked position, call light and personal items in reach, verbalized understanding to call for assistance.

## 2025-06-09 NOTE — SUBJECTIVE & OBJECTIVE
Interval History: Seen this AM at bedside.  Tmax 101.6 overnight..  Endorsing dyspnea however not as severe as previous, no longer experiencing orthopnea.  Endorsing nonproductive cough.    Review of Systems  Objective:     Vital Signs (Most Recent):  Temp: 98.8 °F (37.1 °C) (06/09/25 1140)  Pulse: 91 (06/09/25 1140)  Resp: 18 (06/09/25 1204)  BP: 124/60 (06/09/25 1140)  SpO2: (!) 94 % (06/09/25 1140) Vital Signs (24h Range):  Temp:  [98.8 °F (37.1 °C)-101.6 °F (38.7 °C)] 98.8 °F (37.1 °C)  Pulse:  [] 91  Resp:  [15-18] 18  SpO2:  [89 %-97 %] 94 %  BP: (118-132)/(48-65) 124/60     Weight: 66.8 kg (147 lb 6 oz)  Body mass index is 23.79 kg/m².    Intake/Output Summary (Last 24 hours) at 6/9/2025 1320  Last data filed at 6/9/2025 1205  Gross per 24 hour   Intake 440 ml   Output 2250 ml   Net -1810 ml      Physical Exam  Vitals and nursing note reviewed.   Constitutional:       General: He is not in acute distress.     Appearance: He is ill-appearing.   HENT:      Head: Normocephalic.   Eyes:      General: Scleral icterus present.   Cardiovascular:      Rate and Rhythm: Regular rhythm. Tachycardia present.      Heart sounds: Normal heart sounds. No murmur heard.  Pulmonary:      Breath sounds: Examination of the right-lower field reveals decreased breath sounds. Decreased breath sounds present. No wheezing, rhonchi or rales.   Abdominal:      General: There is no distension.      Palpations: Abdomen is soft.      Tenderness: There is abdominal tenderness.   Musculoskeletal:      Right lower leg: Edema present.      Left lower leg: Edema present.   Skin:     Coloration: Skin is jaundiced.   Neurological:      General: No focal deficit present.      Mental Status: He is alert and oriented to person, place, and time.   Psychiatric:         Mood and Affect: Mood normal.         Behavior: Behavior normal.         MELD 3.0: 26 at 6/9/2025  7:35 AM  MELD-Na: 24 at 6/9/2025  7:35 AM  Calculated from:  Serum Creatinine:  1.7 mg/dL at 6/9/2025  7:35 AM  Serum Sodium: 133 mmol/L at 6/9/2025  7:35 AM  Total Bilirubin: 7.3 mg/dL at 6/9/2025  7:35 AM  Serum Albumin: 2.2 g/dL at 6/9/2025  7:35 AM  INR(ratio): 1.3 at 6/9/2025  7:18 AM  Age at listing (hypothetical): 34 years  Sex: Male at 6/9/2025  7:35 AM      Significant Labs:  CBC:  Recent Labs   Lab 06/08/25  0430 06/09/25  0027 06/09/25  0718   WBC 4.56 6.40 8.14   HGB 8.5* 9.4* 9.7*   HCT 24.4* 27.5* 29.5*   PLT 74* 104* 111*     CMP:  Recent Labs   Lab 06/08/25 0430 06/09/25  0027 06/09/25  0735   * 134* 133*   K 2.9* 3.5 3.4*   CL 94* 95 96   CO2 29 28 28    136* 109   BUN 27* 29* 29*   CREATININE 1.5* 1.6* 1.7*   CALCIUM 7.8* 8.1* 8.1*   PROT 5.7* 6.4 6.4   ALBUMIN 2.1* 2.3* 2.2*   BILITOT 5.1* 6.6* 7.3*   ALKPHOS 141 141 134   AST 53* 63* 60*   ALT 22 26 26   ANIONGAP 11 11 9     PTINR:  Recent Labs   Lab 06/08/25  0430 06/09/25  0718   INR 1.3* 1.3*       Significant Procedures:   Dobutamine Stress Test with Color Flow: No results found. However, due to the size of the patient record, not all encounters were searched. Please check Results Review for a complete set of results.    thoracentesis

## 2025-06-09 NOTE — ASSESSMENT & PLAN NOTE
Patient found to have large pleural effusion on imaging. I have personally reviewed and interpreted the following imaging: Xray. A thoracentesis was performed. Pleural fluid was sent for analysis. Labs reviewed, including see abve. Fluid most consistent with Transudate.  . Most likely etiology includes Cirrhosis. Management to include Diuresis    -Presented with new onset SOB at OSH. Found to have Large right pleural effusion causing compressive atelectasis of most of the right lung on 6/2 CXR. Underwent thora  -Right US guided thoracentesis on 6/3 with removal of 1000 cc of reddish-brown otherwise nonturbid fluid. Thora fluid studies: Gluc 91, , pH 8, Prot 3.5,  with 15% PMN, RBC 20,6000.   -Previous studies consistent with transudative effusion. Likely from ascites   -CT chest with enlarged effusion compared to prior studies at this hospital  Plan:  - pulmonology initially deferring thoracentesis due to stable respiratory status.  Eventually performed on 06/09, 1.5 L removed  - f/u thora labs, cytology  - follow up cytology reported sent at OSH

## 2025-06-09 NOTE — PROGRESS NOTES
[facility] Progress Note      Attending: Brian Ball DO   Admit Date: 6/6/2025  Today's Date: 06/09/2025    SUBJECTIVE:     Interval History:   CT chest from 6/7 with large R pleural effusion now s/p repeat thoracentesis on 6/9 with 1.5L removed.     Scheduled Medications:    diazePAM  5 mg Oral QHS    mupirocin   Nasal BID    piperacillin-tazobactam (Zosyn) IV (PEDS and ADULTS) (extended infusion is not appropriate)  4.5 g Intravenous Q8H    sirolimus  1 mg Oral Daily       PRN Medications:     Current Facility-Administered Medications:     acetaminophen, 650 mg, Oral, Q8H PRN    albuterol-ipratropium, 3 mL, Nebulization, Q4H PRN    aluminum-magnesium hydroxide-simethicone, 30 mL, Oral, QID PRN    bisacodyL, 10 mg, Rectal, Daily PRN    dextrose 50%, 12.5 g, Intravenous, PRN    dextrose 50%, 25 g, Intravenous, PRN    glucagon (human recombinant), 1 mg, Intramuscular, PRN    glucose, 16 g, Oral, PRN    glucose, 24 g, Oral, PRN    melatonin, 6 mg, Oral, Nightly PRN    naloxone, 0.02 mg, Intravenous, PRN    ondansetron, 4 mg, Intravenous, Q8H PRN    oxyCODONE, 30 mg, Oral, Q6H PRN    prochlorperazine, 5 mg, Intravenous, Q6H PRN    senna-docusate, 1 tablet, Oral, BID PRN    Pharmacy to dose Vancomycin consult, , , Once **AND** vancomycin - pharmacy to dose, , Intravenous, pharmacy to manage frequency    OBJECTIVE:     Vital Signs Trends/Hx Reviewed  Vitals:    06/09/25 0612 06/09/25 0815 06/09/25 1140 06/09/25 1204   BP:  (!) 118/53 124/60    BP Location:       Patient Position:       Pulse:  86 91    Resp: 18 18 18 18   Temp:  98.8 °F (37.1 °C) 98.8 °F (37.1 °C)    TempSrc:       SpO2:  97% (!) 94%    Weight:       Height:             Physical Exam  Vitals reviewed.   Constitutional:       General: He is not in acute distress.     Appearance: He is not diaphoretic.   Eyes:      General: Scleral icterus present.   Cardiovascular:      Rate and Rhythm: Normal rate.   Pulmonary:      Effort: Pulmonary  effort is normal.   Abdominal:      General: There is no distension.      Tenderness: There is no abdominal tenderness.   Musculoskeletal:      Right lower leg: Edema present.      Left lower leg: Edema present.   Neurological:      Mental Status: He is alert.          Laboratory:  Lab results in last 24 hours reviewed.     MELD 3.0: 26 at 6/9/2025  7:35 AM  MELD-Na: 24 at 6/9/2025  7:35 AM  Calculated from:  Serum Creatinine: 1.7 mg/dL at 6/9/2025  7:35 AM  Serum Sodium: 133 mmol/L at 6/9/2025  7:35 AM  Total Bilirubin: 7.3 mg/dL at 6/9/2025  7:35 AM  Serum Albumin: 2.2 g/dL at 6/9/2025  7:35 AM  INR(ratio): 1.3 at 6/9/2025  7:18 AM  Age at listing (hypothetical): 34 years  Sex: Male at 6/9/2025  7:35 AM    ASSESSMENT & RECOMMENDATIONS   34M with alcoholic hepatitis s/p liver transplant 10/19/2017 on Sirolimus 1 mg daily with post transplant course complicated by alcohol relapse 2019 (most recent PETH 9/2024 <10), anastomotic stricture requiring stenting, LAURA previously on HD followed by Dr. Morgan, umbilical hernia repair 11/2022, open L inguinal hernia repair 1/2025 and R inguinal hernia repair 4/2025, persistently elevated LFTs since most recent hernia repair who initially presented to OSH with SOB and jaundice, found to have a large R pleural effusion s/p thoracentesis, elevated liver enzymes and LAURA, ultimately sent to [facility] on 6/06 for higher level of care. Hepatology consulted for IS management and hyperbilirubinemia. We recommended liver bx which was completed on 6/8, HVPG 11 concerning for portal hypertension. He spiked a fever on 6/8 to 101F, started on broad spectrum abx. He underwent repeat thoracentesis with Pulm on 6/9, 1.5 L removed.     Problem List:  Alcoholic hepatitis s/p liver transplant on IS  Elevated liver enzymes  Stage 2 fibrosis on liver bx 2024, now with imaging concerning for cirrhosis  Alcohol use disorder (in remission)  LAURA  Pleural effusion s/p thoracentesisx2  LE edema      Recommendations:  -S/p liver bx on 6/8, awaiting pathology.   -Follow up PETH.  -Agree with infectious workup and broad spectrum abx.   -Continue Sirolimus 1mg daily with daily AM sirolimus levels.  -Follow up fluid studies from thoracentesis.  -CMP, INR daily.       Thank you for allowing us to participate in the care of this patient. Please call with questions.      Roya Lizarraga MD  Gastroenterology Fellow, PGY-V  [facility]

## 2025-06-09 NOTE — ASSESSMENT & PLAN NOTE
"This patient does have evidence of infective focus  My overall impression is sepsis without organ dysfunction.  Source: Intra-abdominal Infection  Antibiotics given-   Antibiotics (72h ago, onward)      Start     Stop Route Frequency Ordered    06/09/25 0100  piperacillin-tazobactam (ZOSYN) 4.5 g in D5W 100 mL IVPB (MB+)         -- IV Every 8 hours (non-standard times) 06/08/25 2355    06/09/25 0053  vancomycin - pharmacy to dose  (vancomycin IVPB (PEDS and ADULTS))        Placed in "And" Linked Group    -- IV pharmacy to manage frequency 06/08/25 2355    06/06/25 2100  mupirocin 2 % ointment         06/11/25 2059 Nasl 2 times daily 06/06/25 1759          Latest lactate reviewed-  Recent Labs   Lab 06/09/25  0027   LACTATE 1.4     Organ dysfunction indicated by Acute kidney injury and Acute liver injury    Will Not start Pressors- Levophed for MAP of 65  Source control achieved by: antibiotics  "

## 2025-06-09 NOTE — PLAN OF CARE
Patient is AAOx4.  Afebrile  RA  Telemetry monitoring remains in place.  Thoracentesis performed at bedside. 1550 cc removed. Samples sent accordingly.  GI following  Blood cxs NGTD  Zosyn q8h continued  PRN oxycodone administered for compliant of abdominal pain.   Liver bx results pending  Instructed to call for assistance.   Bed is in lowest position with wheels locked.   Plan of care is ongoing.

## 2025-06-09 NOTE — ASSESSMENT & PLAN NOTE
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 Follows with Dr. Morgan. Hx of anastomotic stricture requiring dilatation and stenting. Had been having uptrending bilirubin and MRCP ordered outpatient, done at [facility] as outpatient. Admitted to [facility] with Tbili of 8, ascites, transudative pleural effusion.   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

## 2025-06-09 NOTE — PROGRESS NOTES
[facility] Progress Note      Attending: Brian Ball DO   Admit Date: 6/6/2025  Today's Date: 06/09/2025    SUBJECTIVE:     Interval History:   CT chest from 6/7 with large R pleural effusion now s/p repeat thoracentesis on 6/9 with 1.5L removed.     Scheduled Medications:    diazePAM  5 mg Oral QHS    mupirocin   Nasal BID    piperacillin-tazobactam (Zosyn) IV (PEDS and ADULTS) (extended infusion is not appropriate)  4.5 g Intravenous Q8H    sirolimus  1 mg Oral Daily       PRN Medications:     Current Facility-Administered Medications:     acetaminophen, 650 mg, Oral, Q8H PRN    albuterol-ipratropium, 3 mL, Nebulization, Q4H PRN    aluminum-magnesium hydroxide-simethicone, 30 mL, Oral, QID PRN    bisacodyL, 10 mg, Rectal, Daily PRN    dextrose 50%, 12.5 g, Intravenous, PRN    dextrose 50%, 25 g, Intravenous, PRN    glucagon (human recombinant), 1 mg, Intramuscular, PRN    glucose, 16 g, Oral, PRN    glucose, 24 g, Oral, PRN    melatonin, 6 mg, Oral, Nightly PRN    naloxone, 0.02 mg, Intravenous, PRN    ondansetron, 4 mg, Intravenous, Q8H PRN    oxyCODONE, 30 mg, Oral, Q6H PRN    prochlorperazine, 5 mg, Intravenous, Q6H PRN    senna-docusate, 1 tablet, Oral, BID PRN    Pharmacy to dose Vancomycin consult, , , Once **AND** vancomycin - pharmacy to dose, , Intravenous, pharmacy to manage frequency    OBJECTIVE:     Vital Signs Trends/Hx Reviewed  Vitals:    06/09/25 0612 06/09/25 0815 06/09/25 1140 06/09/25 1204   BP:  (!) 118/53 124/60    BP Location:       Patient Position:       Pulse:  86 91    Resp: 18 18 18 18   Temp:  98.8 °F (37.1 °C) 98.8 °F (37.1 °C)    TempSrc:       SpO2:  97% (!) 94%    Weight:       Height:             Physical Exam  Vitals reviewed.   Constitutional:       General: He is not in acute distress.     Appearance: He is not diaphoretic.   Eyes:      General: Scleral icterus present.   Cardiovascular:      Rate and Rhythm: Normal rate.   Pulmonary:      Effort: Pulmonary  effort is normal.   Abdominal:      General: There is no distension.      Tenderness: There is no abdominal tenderness.   Musculoskeletal:      Right lower leg: Edema present.      Left lower leg: Edema present.   Neurological:      Mental Status: He is alert.          Laboratory:  Lab results in last 24 hours reviewed.     MELD 3.0: 26 at 6/9/2025  7:35 AM  MELD-Na: 24 at 6/9/2025  7:35 AM  Calculated from:  Serum Creatinine: 1.7 mg/dL at 6/9/2025  7:35 AM  Serum Sodium: 133 mmol/L at 6/9/2025  7:35 AM  Total Bilirubin: 7.3 mg/dL at 6/9/2025  7:35 AM  Serum Albumin: 2.2 g/dL at 6/9/2025  7:35 AM  INR(ratio): 1.3 at 6/9/2025  7:18 AM  Age at listing (hypothetical): 34 years  Sex: Male at 6/9/2025  7:35 AM    ASSESSMENT & RECOMMENDATIONS   34M with alcoholic hepatitis s/p liver transplant 10/19/2017 on Sirolimus 1 mg daily with post transplant course complicated by alcohol relapse 2019 (most recent PETH 9/2024 <10), anastomotic stricture requiring stenting, LAURA previously on HD followed by Dr. Morgan, umbilical hernia repair 11/2022, open L inguinal hernia repair 1/2025 and R inguinal hernia repair 4/2025, persistently elevated LFTs since most recent hernia repair who initially presented to OSH with SOB and jaundice, found to have a large R pleural effusion s/p thoracentesis, elevated liver enzymes and LAURA, ultimately sent to [facility] on 6/06 for higher level of care. Hepatology consulted for IS management and hyperbilirubinemia. We recommended liver bx which was completed on 6/8, HVPG 11 concerning for portal hypertension. He spiked a fever on 6/8 to 101F, started on broad spectrum abx. He underwent repeat thoracentesis with Pulm on 6/9, 1.5 L removed.     Problem List:  Alcoholic hepatitis s/p liver transplant on IS  Elevated liver enzymes  Stage 2 fibrosis on liver bx 2024, now with imaging concerning for cirrhosis  Alcohol use disorder (in remission)  LAURA  Pleural effusion s/p thoracentesisx2  LE edema      Recommendations:  -S/p liver bx on 6/8, awaiting pathology.   -Follow up PETH.  -Agree with infectious workup and broad spectrum abx.   -Continue Sirolimus 1mg daily with daily AM sirolimus levels.  -Follow up fluid studies from thoracentesis.  -CMP, INR daily.       Thank you for allowing us to participate in the care of this patient. Please call with questions.      Roya Lizarraga MD  Gastroenterology Fellow, PGY-V  [facility]

## 2025-06-09 NOTE — ASSESSMENT & PLAN NOTE
Mostly resolved at this point.    Hx of LAURA on CKD at OSH with cr rising to 2.5. Most recently before transfer was 1.69. baseline ~ 1.6-1.7. Started on Lasix 80 IV daily, spironolactone 100mg PO daily  at OSH with minor improvement in edema.     - monitor UOP  - consult nephrology     LAURA is likely due to unclear. Baseline creatinine is 1.6-1.7. Most recent creatinine and eGFR are listed below.  Recent Labs     06/08/25  0430 06/09/25  0027 06/09/25  0735   CREATININE 1.5* 1.6* 1.7*   EGFRNORACEVR >60 57* 53*      Plan  - LAURA is stable  - Avoid nephrotoxins and renally dose meds for GFR listed above  - Monitor urine output, serial BMP, and adjust therapy as needed

## 2025-06-09 NOTE — PROGRESS NOTES
[facility]  Progress Note    Patient Name: Jhonny Diana  MRN: [id number]  Patient Class: IP- Inpatient   Admission Date: 6/6/2025  Length of Stay: 3 days  Attending Physician: Brian Ball DO  Primary Care Provider: Elo Bull MD        Subjective     Principal Problem:History of liver transplant        HPI:   Per  note:    Mr. 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 His post-transplant period was notable for LAURA previously on HD, anastomotic stricture requiring dilatation and stenting, and ETOH relapse (6/2019). He has followed with Dr. Julio in Hepatology, though his last clinic visit was 5/30/23. He is now followed by Dr. Morgan in Transplant Clinic. Per Dr. Michel at [facility], he is only on Sirolimus 1mg daily (of note, this was held a short time after his hernia repair).    He was recently admitted to [facility] in [place] on 6/2 with SOB, jaundice, and anasarca. Since admit, he has been taking his home Sirolimis.    He was ultimately found to have a large right pleural effusion. He had a right US guided thoracentesis on 6/3 with removal of 1000 cc of reddish-brown otherwise nonturbid fluid. Thora fluid studies: Gluc 91, , pH 8, Prot 3.5,  with 15% PMN, RBC 20,6000. He also had ascites, Abdominal U/S: Only trace fluid right upper quadrant insufficient for ultrasound-guided drainage.     He was also found to be in LAURA with Cr of 1.98, which then went to 2.02, and has now improved to 1.8 again with IV diuresis (Lasix + Metolazone). Nephrology there was consulted. At admit: WBC 7.1, Hg 10.6, RHY191, AST 46, ALT 28, TB 8.2, NH3 57, , INR 1.3. TB today has improved to 5.6.    Yesterday, unexpectedly his  blood cultures 2/2 turned positive for GPC. He was started on Vancomycin and Rocephin 2g iv daily. They do not have ID service there and have not done TTE or BRADEN. Given elevated TB and bacteremia, GI there recommended transfer to center with Hepatology to help manage him with history of XPL.    Current VS 6/4 at 4pm: T 98.0F, P 80, RR 80, /55.    CXR 6/2: Large right pleural effusion causing compressive atelectasis of most of the right lung. Left lung is clear.The trachea appears severely narrowed and the right mainstem bronchus is not well seen, raising the possibility of a mediastinal mass.     Overview/Hospital Course:  Initially presented to OSH due to new onset SOB, jaundice an anasarca. Found to have large right sided pleural effusion and atelectasis. 1L removed by thora on 6/3. Patient transferred for further hepatology eval and persistent R sided pleural effusion. Hepatology recommending TJ biopsy with portal pressures, done by IR 6/8, results pending.  Pulmonology consulted, s/p thoracentesis 6/9 with 1.5 L removed.  Given persistent fevers, restarted on empiric antibiotics (had resolved GPC during previous hospilization, abx ultimately discontinued as bacteremia was likely contaminant).  Per hepatology, most certainly as cirrhosis of his allograft, would not be re transplant candidate    Interval History: Seen this AM at bedside.  Tmax 101.6 overnight..  Endorsing dyspnea however not as severe as previous, no longer experiencing orthopnea.  Endorsing nonproductive cough.    Review of Systems  Objective:     Vital Signs (Most Recent):  Temp: 98.8 °F (37.1 °C) (06/09/25 1140)  Pulse: 91 (06/09/25 1140)  Resp: 18 (06/09/25 1204)  BP: 124/60 (06/09/25 1140)  SpO2: (!) 94 % (06/09/25 1140) Vital Signs (24h Range):  Temp:  [98.8 °F (37.1 °C)-101.6 °F (38.7 °C)] 98.8 °F (37.1 °C)  Pulse:  [] 91  Resp:  [15-18] 18  SpO2:  [89 %-97 %] 94 %  BP: (118-132)/(48-65) 124/60     Weight: 66.8 kg (147 lb 6  oz)  Body mass index is 23.79 kg/m².    Intake/Output Summary (Last 24 hours) at 6/9/2025 1320  Last data filed at 6/9/2025 1205  Gross per 24 hour   Intake 440 ml   Output 2250 ml   Net -1810 ml      Physical Exam  Vitals and nursing note reviewed.   Constitutional:       General: He is not in acute distress.     Appearance: He is ill-appearing.   HENT:      Head: Normocephalic.   Eyes:      General: Scleral icterus present.   Cardiovascular:      Rate and Rhythm: Regular rhythm. Tachycardia present.      Heart sounds: Normal heart sounds. No murmur heard.  Pulmonary:      Breath sounds: Examination of the right-lower field reveals decreased breath sounds. Decreased breath sounds present. No wheezing, rhonchi or rales.   Abdominal:      General: There is no distension.      Palpations: Abdomen is soft.      Tenderness: There is abdominal tenderness.   Musculoskeletal:      Right lower leg: Edema present.      Left lower leg: Edema present.   Skin:     Coloration: Skin is jaundiced.   Neurological:      General: No focal deficit present.      Mental Status: He is alert and oriented to person, place, and time.   Psychiatric:         Mood and Affect: Mood normal.         Behavior: Behavior normal.         MELD 3.0: 26 at 6/9/2025  7:35 AM  MELD-Na: 24 at 6/9/2025  7:35 AM  Calculated from:  Serum Creatinine: 1.7 mg/dL at 6/9/2025  7:35 AM  Serum Sodium: 133 mmol/L at 6/9/2025  7:35 AM  Total Bilirubin: 7.3 mg/dL at 6/9/2025  7:35 AM  Serum Albumin: 2.2 g/dL at 6/9/2025  7:35 AM  INR(ratio): 1.3 at 6/9/2025  7:18 AM  Age at listing (hypothetical): 34 years  Sex: Male at 6/9/2025  7:35 AM      Significant Labs:  CBC:  Recent Labs   Lab 06/08/25  0430 06/09/25  0027 06/09/25  0718   WBC 4.56 6.40 8.14   HGB 8.5* 9.4* 9.7*   HCT 24.4* 27.5* 29.5*   PLT 74* 104* 111*     CMP:  Recent Labs   Lab 06/08/25  0430 06/09/25  0027 06/09/25  0735   * 134* 133*   K 2.9* 3.5 3.4*   CL 94* 95 96   CO2 29 28 28    136*  109   BUN 27* 29* 29*   CREATININE 1.5* 1.6* 1.7*   CALCIUM 7.8* 8.1* 8.1*   PROT 5.7* 6.4 6.4   ALBUMIN 2.1* 2.3* 2.2*   BILITOT 5.1* 6.6* 7.3*   ALKPHOS 141 141 134   AST 53* 63* 60*   ALT 22 26 26   ANIONGAP 11 11 9     PTINR:  Recent Labs   Lab 06/08/25  0430 06/09/25  0718   INR 1.3* 1.3*       Significant Procedures:   Dobutamine Stress Test with Color Flow: No results found. However, due to the size of the patient record, not all encounters were searched. Please check Results Review for a complete set of results.    thoracentesis      Assessment & Plan  Pleural effusion    Patient found to have large pleural effusion on imaging. I have personally reviewed and interpreted the following imaging: Xray. A thoracentesis was performed. Pleural fluid was sent for analysis. Labs reviewed, including see abve. Fluid most consistent with Transudate.  . Most likely etiology includes Cirrhosis. Management to include Diuresis    -Presented with new onset SOB at OSH. Found to have Large right pleural effusion causing compressive atelectasis of most of the right lung on 6/2 CXR. Underwent thora  -Right US guided thoracentesis on 6/3 with removal of 1000 cc of reddish-brown otherwise nonturbid fluid. Thora fluid studies: Gluc 91, , pH 8, Prot 3.5,  with 15% PMN, RBC 20,6000.   -Previous studies consistent with transudative effusion. Likely from ascites   -CT chest with enlarged effusion compared to prior studies at this hospital  Plan:  - pulmonology initially deferring thoracentesis due to stable respiratory status.  Eventually performed on 06/09, 1.5 L removed  - f/u thora labs, cytology  - follow up cytology reported sent at OSH       History of liver transplant  Hyperbilirubinemia  Immunosuppression  Liver tx in 2017 2/2 Etoh cirrhosis. Follows with Dr. Morgan. Hx of anastomotic stricture requiring dilatation and stenting. Had been having uptrending bilirubin and MRCP ordered outpatient, done at O[facility] as  "outpatient. Admitted to [facility] with Tbili of 8, ascites, transudative pleural effusion.   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 NOT given here.   S/p 25mg albumin and lasix 80mg IV evening of 6/7 with some improvement     Sepsis  This patient does have evidence of infective focus  My overall impression is sepsis without organ dysfunction.  Source: Intra-abdominal Infection  Antibiotics given-   Antibiotics (72h ago, onward)      Start     Stop Route Frequency Ordered    06/09/25 0100  piperacillin-tazobactam (ZOSYN) 4.5 g in D5W 100 mL IVPB (MB+)         -- IV Every 8 hours (non-standard times) 06/08/25 2355    06/09/25 0053  vancomycin - pharmacy to dose  (vancomycin IVPB (PEDS and ADULTS))        Placed in "And" Linked Group    -- IV pharmacy to manage frequency 06/08/25 2355    06/06/25 2100  mupirocin 2 % ointment         06/11/25 2059 Nasl 2 times daily 06/06/25 1759          Latest lactate reviewed-  Recent Labs   Lab 06/09/25  0027   LACTATE 1.4     Organ dysfunction indicated by Acute kidney injury and Acute liver injury    Will Not start Pressors- Levophed for MAP of 65  Source control achieved by: antibiotics  Acute kidney injury superimposed on CKD  Mostly resolved at this point.    Hx of LAURA on CKD at OSH with cr rising to 2.5. Most recently before transfer was 1.69. baseline ~ 1.6-1.7. Started on Lasix 80 IV daily, spironolactone 100mg PO daily  at OSH with minor improvement in edema.     - monitor UOP  - consult nephrology     LAURA is " likely due to unclear. Baseline creatinine is 1.6-1.7. Most recent creatinine and eGFR are listed below.  Recent Labs     06/08/25  0430 06/09/25  0027 06/09/25  0735   CREATININE 1.5* 1.6* 1.7*   EGFRNORACEVR >60 57* 53*      Plan  - LAURA is stable  - Avoid nephrotoxins and renally dose meds for GFR listed above  - Monitor urine output, serial BMP, and adjust therapy as needed  Opioid dependence  Chronic pain  Follows with pain hospitalist. Takes 30mg oxycodone QID   - continue oxycodone 30mg q6h PRN to avoid withdrawals while inpatient     Hypokalemia  Patient's most recent potassium results are listed below.   Recent Labs     06/08/25  0430 06/09/25 0027 06/09/25  0735   K 2.9* 3.5 3.4*     Plan  - Replete potassium per protocol  - Monitor potassium Daily  - Patient's hypokalemia is stable  Hyponatremia  Hyponatremia is likely due to Cirrhosis. The patient's most recent sodium results are listed below.  Recent Labs     06/08/25  0430 06/09/25  0027 06/09/25  0735   * 134* 133*     Plan  - Monitor sodium Daily.   - Patient hyponatremia is stable  CKD stage 3a, GFR 45-59 ml/min  Creatine stable for now. BMP reviewed- noted Estimated Creatinine Clearance: 54.7 mL/min (A) (based on SCr of 1.7 mg/dL (H)). according to latest data. Based on current GFR, CKD stage is stage 2 - GFR 60-89.  Monitor UOP and serial BMP and adjust therapy as needed. Renally dose meds. Avoid nephrotoxic medications and procedures.  VTE Risk Mitigation (From admission, onward)           Ordered     IP VTE LOW RISK PATIENT  Once         06/06/25 1604     Place sequential compression device  Until discontinued         06/06/25 1604                    Discharge Planning   DERICK: 6/14/2025     Code Status: Full Code   Medical Readiness for Discharge Date:   Discharge Plan A: Home with family                        Brian Ball DO  Department of Hospital Medicine   [facility]

## 2025-06-09 NOTE — PROGRESS NOTES
Pharmacokinetic Initial Assessment: IV Vancomycin    Assessment/Plan:    Patient previously on vancomycin therapy. Last dose vancomycin IV 1500 mg (20 mg/kg) on 6/6 at 2043.  Vancomycin random resulted at 13 mcg/mL on 6/9 at 0027 (~52 hour level)  Initiate intravenous vancomycin with dose of 1000 mg once with subsequent doses when random concentrations are less than 20 mcg/mL  Desired empiric serum trough concentration is 15 to 20 mcg/mL  Draw vancomycin random level on 6/10 at 0430 with AM labs.  Pharmacy will continue to follow and monitor vancomycin.      Please contact pharmacy at extension [phone] with any questions regarding this assessment.     Thank you for the consult,   Samia Ratliff       Patient brief summary:  Jhonny Diana is a 34 y.o. male initiated on antimicrobial therapy with IV Vancomycin for treatment of suspected sepsis    Drug Allergies:   Review of patient's allergies indicates:   Allergen Reactions    Bactrim [sulfamethoxazole-trimethoprim] Other (See Comments)     Mookie Trell Syndrome       Actual Body Weight:   73.9 kg    Renal Function:   Estimated Creatinine Clearance: 58.2 mL/min (A) (based on SCr of 1.6 mg/dL (H)).    Dialysis Method (if applicable):  N/A    CBC (last 72 hours):  Recent Labs   Lab Result Units 06/06/25  1635 06/07/25  0146 06/08/25  0430 06/09/25  0027   WBC K/uL 9.13 6.34 4.56 6.40   HGB gm/dL 10.4* 8.5* 8.5* 9.4*   HCT % 30.6* 25.5* 24.4* 27.5*   Platelet Count K/uL 134* 88* 74* 104*   Lymph % % 20.5 29.0 34.9 22.3   Mono % % 12.3 15.9* 14.7 12.5   Eos % % 9.9* 8.4* 8.3* 6.7   Basophil % % 0.7 0.8 0.9 0.8       Metabolic Panel (last 72 hours):  Recent Labs   Lab Result Units 06/06/25  1635 06/06/25  2104 06/07/25  0146 06/08/25  0430 06/09/25  0027   Sodium mmol/L 131*  --  131* 134* 134*   Urine Sodium mmol/L  --  50  --   --   --    Potassium mmol/L 3.1*  --  3.4* 2.9* 3.5   Urine Potassium mmol/L  --  17  --   --   --    Chloride mmol/L 94*  --  99 94* 95    CO2 mmol/L 24  --  24 29 28   Glucose mg/dL 93  --  115* 108 136*   Glucose, UA   --  Negative  --   --   --    BUN mg/dL 28*  --  27* 27* 29*   Creatinine mg/dL 1.7*  --  1.5* 1.5* 1.6*   Urine Creatinine mg/dL  --  58.0  57.0  --   --   --    Albumin g/dL 2.2*  --  1.8* 2.1* 2.3*   Bilirubin Total mg/dL 7.1*  --  5.8* 5.1* 6.6*   ALP unit/L 179*  --  124 141 141   AST unit/L 62*  --  55* 53* 63*   ALT unit/L 26  --  24 22 26   Magnesium  mg/dL 1.6  --  1.7 1.6  --    Phosphorus Level mg/dL 2.7  --  2.4* 2.5*  --        Drug levels (last 3 results):  Recent Labs   Lab Result Units 06/06/25  1635 06/07/25  0146 06/09/25  0027   Vancomycin Random ug/ml 34.5 40.6 13.0       Microbiologic Results:  Microbiology Results (last 7 days)       Procedure Component Value Units Date/Time    Blood culture [[id number]] Collected: 06/09/25 0035    Order Status: Sent Specimen: Blood from Peripheral, Lower Arm, Left     Blood culture [[id number]] Collected: 06/09/25 0027    Order Status: Sent Specimen: Blood from Peripheral, Lower Arm, Right     Blood culture [[id number]]  (Normal) Collected: 06/06/25 1634    Order Status: Completed Specimen: Blood from Peripheral, Antecubital, Left Updated: 06/08/25 1901     Blood Culture No Growth After 48 Hours    Blood culture [[id number]]  (Normal) Collected: 06/06/25 1634    Order Status: Completed Specimen: Blood from Peripheral, Forearm, Left Updated: 06/08/25 1901     Blood Culture No Growth After 48 Hours

## 2025-06-09 NOTE — ASSESSMENT & PLAN NOTE
Creatine stable for now. BMP reviewed- noted Estimated Creatinine Clearance: 54.7 mL/min (A) (based on SCr of 1.7 mg/dL (H)). according to latest data. Based on current GFR, CKD stage is stage 2 - GFR 60-89.  Monitor UOP and serial BMP and adjust therapy as needed. Renally dose meds. Avoid nephrotoxic medications and procedures.

## 2025-06-09 NOTE — ASSESSMENT & PLAN NOTE
Hyponatremia is likely due to Cirrhosis. The patient's most recent sodium results are listed below.  Recent Labs     06/08/25  0430 06/09/25  0027 06/09/25  0735   * 134* 133*     Plan  - Monitor sodium Daily.   - Patient hyponatremia is stable

## 2025-06-09 NOTE — ASSESSMENT & PLAN NOTE
Patient's most recent potassium results are listed below.   Recent Labs     06/08/25  0430 06/09/25  0027 06/09/25  0735   K 2.9* 3.5 3.4*     Plan  - Replete potassium per protocol  - Monitor potassium Daily  - Patient's hypokalemia is stable

## 2025-06-09 NOTE — PROCEDURES
"Jhonny Diana is a 34 y.o. male patient.    Temp: 98.8 °F (37.1 °C) (06/09/25 1140)  Pulse: 91 (06/09/25 1140)  Resp: 18 (06/09/25 1140)  BP: 124/60 (06/09/25 1140)  SpO2: (!) 94 % (06/09/25 1140)  Weight: 66.8 kg (147 lb 6 oz) (06/09/25 0545)  Height: 5' 6" (167.6 cm) (06/08/25 1030)       Thoracentesis    Date/Time: 6/9/2025 11:41 AM  Location procedure was performed: [facility] PULMONARY MEDICINE    Performed by: Judie Banks MD  Authorized by: Judie Banks MD  Consent Done: Yes  Consent: Written consent obtained  Risks and benefits: risks, benefits and alternatives were discussed  Consent given by: patient  Patient understanding: patient states understanding of the procedure being performed  Patient consent: the patient's understanding of the procedure matches consent given  Patient identity confirmed: name  Procedure purpose: diagnostic and therapeutic  Indications: pleural effusion  Preparation: Patient was prepped and draped in the usual sterile fashion.  Local anesthesia used: yes    Anesthesia:  Local anesthesia used: yes  Local Anesthetic: lidocaine 1% without epinephrine  Preparation: skin prepped with ChloraPrep  Location: right lateral  Needle size: 14  Number of attempts: 1  Drainage amount: 1550 ml  Fluid characteristics: Dark serosanguinous, picture of fluid in media.  Patient tolerance: Patient tolerated the procedure well with no immediate complications  Chest x-ray performed: no  Complications: No  Estimated blood loss (mL): 1  Drainage Tube: removed        6/9/2025    "

## 2025-06-10 LAB
ABSOLUTE EOSINOPHIL (OHS): 0.4 K/UL
ABSOLUTE MONOCYTE (OHS): 0.72 K/UL (ref 0.3–1)
ABSOLUTE NEUTROPHIL COUNT (OHS): 2.39 K/UL (ref 1.8–7.7)
ACID FAST MOD KINY STN SPEC: NORMAL
ALBUMIN SERPL BCP-MCNC: 1.8 G/DL (ref 3.5–5.2)
ALP SERPL-CCNC: 120 UNIT/L (ref 40–150)
ALT SERPL W/O P-5'-P-CCNC: 23 UNIT/L (ref 10–44)
ANA (OHS): NORMAL
ANION GAP (OHS): 8 MMOL/L (ref 8–16)
AST SERPL-CCNC: 51 UNIT/L (ref 11–45)
BASOPHILS # BLD AUTO: 0.03 K/UL
BASOPHILS NFR BLD AUTO: 0.6 %
BILIRUB DIRECT SERPL-MCNC: 4.5 MG/DL (ref 0.1–0.3)
BILIRUB SERPL-MCNC: 5.8 MG/DL (ref 0.1–1)
BUN SERPL-MCNC: 30 MG/DL (ref 6–20)
CALCIUM SERPL-MCNC: 7.5 MG/DL (ref 8.7–10.5)
CHLORIDE SERPL-SCNC: 96 MMOL/L (ref 95–110)
CO2 SERPL-SCNC: 28 MMOL/L (ref 23–29)
CREAT SERPL-MCNC: 1.7 MG/DL (ref 0.5–1.4)
ERYTHROCYTE [DISTWIDTH] IN BLOOD BY AUTOMATED COUNT: 13.9 % (ref 11.5–14.5)
GFR SERPLBLD CREATININE-BSD FMLA CKD-EPI: 53 ML/MIN/1.73/M2
GLUCOSE SERPL-MCNC: 106 MG/DL (ref 70–110)
HCT VFR BLD AUTO: 25.8 % (ref 40–54)
HGB BLD-MCNC: 8.5 GM/DL (ref 14–18)
IGG SERPL-MCNC: 1477 MG/DL (ref 650–1600)
IMM GRANULOCYTES # BLD AUTO: 0.03 K/UL (ref 0–0.04)
IMM GRANULOCYTES NFR BLD AUTO: 0.6 % (ref 0–0.5)
INR PPP: 1.3 (ref 0.8–1.2)
LDH SERPL-CCNC: 210 U/L (ref 110–260)
LYMPHOCYTES # BLD AUTO: 1.35 K/UL (ref 1–4.8)
MAGNESIUM SERPL-MCNC: 1.8 MG/DL (ref 1.6–2.6)
MCH RBC QN AUTO: 32.6 PG (ref 27–31)
MCHC RBC AUTO-ENTMCNC: 32.9 G/DL (ref 32–36)
MCV RBC AUTO: 99 FL (ref 82–98)
MITOCHONDRIA AB TITR SER IF: NORMAL {TITER}
NUCLEATED RBC (/100WBC) (OHS): 0 /100 WBC
PHOSPHATE SERPL-MCNC: 2.4 MG/DL (ref 2.7–4.5)
PLATELET # BLD AUTO: 80 K/UL (ref 150–450)
PMV BLD AUTO: 9.4 FL (ref 9.2–12.9)
POTASSIUM SERPL-SCNC: 2.9 MMOL/L (ref 3.5–5.1)
PROT SERPL-MCNC: 5.5 GM/DL (ref 6–8.4)
PROTHROMBIN TIME: 13.6 SECONDS (ref 9–12.5)
RBC # BLD AUTO: 2.61 M/UL (ref 4.6–6.2)
RELATIVE EOSINOPHIL (OHS): 8.1 %
RELATIVE LYMPHOCYTE (OHS): 27.4 % (ref 18–48)
RELATIVE MONOCYTE (OHS): 14.6 % (ref 4–15)
RELATIVE NEUTROPHIL (OHS): 48.7 % (ref 38–73)
SIROLIMUS BLD-MCNC: 6.6 NG/ML (ref 4–20)
SMOOTH MUSCLE AB TITR SER IF: NORMAL {TITER}
SODIUM SERPL-SCNC: 132 MMOL/L (ref 136–145)
VANCOMYCIN SERPL-MCNC: 14.6 UG/ML (ref ?–80)
WBC # BLD AUTO: 4.92 K/UL (ref 3.9–12.7)

## 2025-06-10 PROCEDURE — 85610 PROTHROMBIN TIME: CPT

## 2025-06-10 PROCEDURE — 82784 ASSAY IGA/IGD/IGG/IGM EACH: CPT | Performed by: INTERNAL MEDICINE

## 2025-06-10 PROCEDURE — 82310 ASSAY OF CALCIUM: CPT

## 2025-06-10 PROCEDURE — 82248 BILIRUBIN DIRECT: CPT | Performed by: INTERNAL MEDICINE

## 2025-06-10 PROCEDURE — 63600175 PHARM REV CODE 636 W HCPCS: Mod: UD | Performed by: STUDENT IN AN ORGANIZED HEALTH CARE EDUCATION/TRAINING PROGRAM

## 2025-06-10 PROCEDURE — 80202 ASSAY OF VANCOMYCIN: CPT | Performed by: STUDENT IN AN ORGANIZED HEALTH CARE EDUCATION/TRAINING PROGRAM

## 2025-06-10 PROCEDURE — 83735 ASSAY OF MAGNESIUM: CPT

## 2025-06-10 PROCEDURE — 83615 LACTATE (LD) (LDH) ENZYME: CPT | Performed by: STUDENT IN AN ORGANIZED HEALTH CARE EDUCATION/TRAINING PROGRAM

## 2025-06-10 PROCEDURE — 20600001 HC STEP DOWN PRIVATE ROOM

## 2025-06-10 PROCEDURE — 86038 ANTINUCLEAR ANTIBODIES: CPT | Performed by: INTERNAL MEDICINE

## 2025-06-10 PROCEDURE — 63600175 PHARM REV CODE 636 W HCPCS: Performed by: HOSPITALIST

## 2025-06-10 PROCEDURE — 84100 ASSAY OF PHOSPHORUS: CPT

## 2025-06-10 PROCEDURE — 25000003 PHARM REV CODE 250

## 2025-06-10 PROCEDURE — 86381 MITOCHONDRIAL ANTIBODY EACH: CPT | Performed by: INTERNAL MEDICINE

## 2025-06-10 PROCEDURE — 25000003 PHARM REV CODE 250: Performed by: STUDENT IN AN ORGANIZED HEALTH CARE EDUCATION/TRAINING PROGRAM

## 2025-06-10 PROCEDURE — 85025 COMPLETE CBC W/AUTO DIFF WBC: CPT

## 2025-06-10 PROCEDURE — 63600175 PHARM REV CODE 636 W HCPCS

## 2025-06-10 PROCEDURE — 36415 COLL VENOUS BLD VENIPUNCTURE: CPT | Performed by: INTERNAL MEDICINE

## 2025-06-10 PROCEDURE — 86015 ACTIN ANTIBODY EACH: CPT | Performed by: INTERNAL MEDICINE

## 2025-06-10 PROCEDURE — 36415 COLL VENOUS BLD VENIPUNCTURE: CPT

## 2025-06-10 PROCEDURE — 25000003 PHARM REV CODE 250: Mod: UD | Performed by: STUDENT IN AN ORGANIZED HEALTH CARE EDUCATION/TRAINING PROGRAM

## 2025-06-10 PROCEDURE — 80195 ASSAY OF SIROLIMUS: CPT | Performed by: STUDENT IN AN ORGANIZED HEALTH CARE EDUCATION/TRAINING PROGRAM

## 2025-06-10 PROCEDURE — 25000003 PHARM REV CODE 250: Performed by: HOSPITALIST

## 2025-06-10 RX ORDER — POTASSIUM CHLORIDE 20 MEQ/1
40 TABLET, EXTENDED RELEASE ORAL ONCE
Status: COMPLETED | OUTPATIENT
Start: 2025-06-10 | End: 2025-06-10

## 2025-06-10 RX ADMIN — OXYCODONE HYDROCHLORIDE 30 MG: 10 TABLET ORAL at 12:06

## 2025-06-10 RX ADMIN — OXYCODONE HYDROCHLORIDE 30 MG: 10 TABLET ORAL at 01:06

## 2025-06-10 RX ADMIN — OXYCODONE HYDROCHLORIDE 30 MG: 10 TABLET ORAL at 07:06

## 2025-06-10 RX ADMIN — PIPERACILLIN SODIUM AND TAZOBACTAM SODIUM 4.5 G: 4; .5 INJECTION, POWDER, FOR SOLUTION INTRAVENOUS at 09:06

## 2025-06-10 RX ADMIN — MUPIROCIN: 20 OINTMENT TOPICAL at 09:06

## 2025-06-10 RX ADMIN — VANCOMYCIN HYDROCHLORIDE 750 MG: 750 INJECTION, POWDER, LYOPHILIZED, FOR SOLUTION INTRAVENOUS at 01:06

## 2025-06-10 RX ADMIN — PIPERACILLIN SODIUM AND TAZOBACTAM SODIUM 4.5 G: 4; .5 INJECTION, POWDER, FOR SOLUTION INTRAVENOUS at 05:06

## 2025-06-10 RX ADMIN — SIROLIMUS 1 MG: 1 TABLET ORAL at 07:06

## 2025-06-10 RX ADMIN — ACETAMINOPHEN 650 MG: 325 TABLET ORAL at 12:06

## 2025-06-10 RX ADMIN — PIPERACILLIN SODIUM AND TAZOBACTAM SODIUM 4.5 G: 4; .5 INJECTION, POWDER, FOR SOLUTION INTRAVENOUS at 12:06

## 2025-06-10 RX ADMIN — POTASSIUM CHLORIDE 40 MEQ: 1500 TABLET, EXTENDED RELEASE ORAL at 09:06

## 2025-06-10 RX ADMIN — OXYCODONE HYDROCHLORIDE 30 MG: 10 TABLET ORAL at 06:06

## 2025-06-10 RX ADMIN — MUPIROCIN: 20 OINTMENT TOPICAL at 08:06

## 2025-06-10 RX ADMIN — DIAZEPAM 5 MG: 5 TABLET ORAL at 08:06

## 2025-06-10 NOTE — ASSESSMENT & PLAN NOTE
Hyponatremia is likely due to Cirrhosis. The patient's most recent sodium results are listed below.  Recent Labs     06/09/25  0027 06/09/25  0735 06/10/25  0601   * 133* 132*     Plan  - Monitor sodium Daily.   - Patient hyponatremia is stable

## 2025-06-10 NOTE — ASSESSMENT & PLAN NOTE
Patient found to have large pleural effusion on imaging. I have personally reviewed and interpreted the following imaging: Xray. A thoracentesis was performed. Pleural fluid was sent for analysis. Labs reviewed, including see abve. Fluid previously most consistent with Transudate. Studies from 6/9 were exudative by Lights criteria which may have been skewed by diuretics. Most likely etiology includes Cirrhosis. Management to include Diuresis    -Presented with new onset SOB at OSH. Found to have Large right pleural effusion causing compressive atelectasis of most of the right lung on 6/2 CXR. Underwent thora  -Right US guided thoracentesis on 6/3 with removal of 1000 cc of reddish-brown otherwise nonturbid fluid. Thora fluid studies: Gluc 91, , pH 8, Prot 3.5,  with 15% PMN, RBC 20,6000.   -Previous studies consistent with transudative effusion. Likely from ascites   -6/9: Exudative by Light's criteria due to protein content  -CT chest with enlarged effusion compared to prior studies at this hospital  Plan:  - pulmonology initially deferring thoracentesis due to stable respiratory status.  Eventually performed on 06/09, 1.5 L removed  - f/u thora labs, cytology  - follow up cytology reported sent at OSH

## 2025-06-10 NOTE — PLAN OF CARE
"Pulmonary Plan of Care    33 yo male with PMH of Etoh cirrhosis s/p liver transplant (2017), previous smoker (quit in 2017), CKD, hypertension who presented for shortness of breath, jaundice and anasarca at OSH. He was found to have right sided pleural effusion and underwent thoracentesis on 6/3 at OSH that resulted with 20K RBCs, 171 WBC, only 15% PMN, glucose 91, , protein 3.5. He was transferred to [facility] and due to concern for a significant amount of RBCs in his pleural fluid and its reoccurrence pulmonary was consulted and repeat thoracentesis was performed on 6/9 which showed few WBC (30) with monocytic predominance (80%), , glucose 100, and protein 3.5.    With protein of 3.5 and albumin of 1.5 this is borderline exudate and could be in the setting of diuresis vs drug induced vs chronic effusion in the setting of volume overload. "Sirolimus can be associated with non-chylous pleural effusions as an adverse effect, with reported incidences up to 16.5% in liver transplant recipients  Sirolimus pleural effusion usually presents within weeks to months of starting the drug" (https://pubmed.ncbi.nlm.nih.gov/13956581/). The patient had been off sirolimus from 10/2024 until around 4/2025. Prior to restarting his sirolimus, CXRs revealed only small right pleural effusion.    - Effusion is currently not causing symptoms for the patient; Would not recommend further thoracentesis at this time, instead would continue to optimize volume status and liver cirrhosis  - Follow up cytology and final culture results    Pulmonary will sign off. Please reach out with any further questions.    Discussed with Dr. Howard.     Judie Banks,   [facility] Fellow   "

## 2025-06-10 NOTE — ASSESSMENT & PLAN NOTE
"This patient does have evidence of infective focus  My overall impression is sepsis without organ dysfunction.  Source: Intra-abdominal Infection  Antibiotics given-   Antibiotics (72h ago, onward)      Start     Stop Route Frequency Ordered    06/10/25 1330  vancomycin 750 mg in 0.9% NaCl 250 mL IVPB (admixture device)         -- IV Once 06/10/25 1228    06/09/25 0100  piperacillin-tazobactam (ZOSYN) 4.5 g in D5W 100 mL IVPB (MB+)         -- IV Every 8 hours (non-standard times) 06/08/25 2355    06/09/25 0053  vancomycin - pharmacy to dose  (vancomycin IVPB (PEDS and ADULTS))        Placed in "And" Linked Group    -- IV pharmacy to manage frequency 06/08/25 2355    06/06/25 2100  mupirocin 2 % ointment         06/11/25 2059 Nasl 2 times daily 06/06/25 1759          Latest lactate reviewed-  Recent Labs   Lab 06/09/25  0027   LACTATE 1.4     Organ dysfunction indicated by Acute kidney injury and Acute liver injury    Will Not start Pressors- Levophed for MAP of 65  Source control achieved by: antibiotics  "

## 2025-06-10 NOTE — PROGRESS NOTES
Pharmacokinetic Assessment Follow Up: IV Vancomycin    Vancomycin serum concentration assessment/plan(s):    -Vancomycin random 14.6, slightly below goal of 15 - 20 for sepsis  -Re-dose with vancomycin 750 mg IVPB x1  -Repeat random level in AM with tentative plans for re-dosing with levels < 20  -Scr 1.7, CKD at baseline, monitor closely on vancomycin & piperacillin/tazobactam    Drug levels (last 3 results):  Recent Labs   Lab Result Units 06/09/25  0027 06/10/25  0601   Vancomycin Random ug/ml 13.0 14.6       Pharmacy will continue to follow and monitor vancomycin.    Please contact pharmacy at extension [phone] for questions regarding this assessment.    Thank you for the consult,   Antony Rosen       Patient brief summary:  Jhonny Diana is a 34 y.o. male initiated on antimicrobial therapy with IV Vancomycin for treatment of sepsis    The patient's current regimen is vancomycin pulse dosing    Drug Allergies:   Review of patient's allergies indicates:   Allergen Reactions    Bactrim [sulfamethoxazole-trimethoprim] Other (See Comments)     Mookie Trell Syndrome       Actual Body Weight:   67 kg    Renal Function:   Estimated Creatinine Clearance: 54.7 mL/min (A) (based on SCr of 1.7 mg/dL (H)).,     Dialysis Method (if applicable):  N/A    CBC (last 72 hours):  Recent Labs   Lab Result Units 06/08/25  0430 06/09/25  0027 06/09/25  0718 06/10/25  0601   WBC K/uL 4.56 6.40 8.14 4.92   HGB gm/dL 8.5* 9.4* 9.7* 8.5*   HCT % 24.4* 27.5* 29.5* 25.8*   Platelet Count K/uL 74* 104* 111* 80*   Lymph % % 34.9 22.3 24.8 27.4   Mono % % 14.7 12.5 13.6 14.6   Eos % % 8.3* 6.7 8.5* 8.1*   Basophil % % 0.9 0.8 0.7 0.6       Metabolic Panel (last 72 hours):  Recent Labs   Lab Result Units 06/08/25  0430 06/09/25  0027 06/09/25  0609 06/09/25  0735 06/09/25  1131 06/10/25  0601   Sodium mmol/L 134* 134*  --  133*  --  132*   Potassium mmol/L 2.9* 3.5  --  3.4*  --  2.9*   Chloride mmol/L 94* 95  --  96  --  96   CO2 mmol/L  29 28  --  28  --  28   Glucose mg/dL 108 136*  --  109  --  106   Glucose Body Fluid mg/dL  --   --   --   --  100  --    Glucose, UA   --   --  Negative  --   --   --    BUN mg/dL 27* 29*  --  29*  --  30*   Creatinine mg/dL 1.5* 1.6*  --  1.7*  --  1.7*   Albumin g/dL 2.1* 2.3*  --  2.2*  --  1.8*   Bilirubin Total mg/dL 5.1* 6.6*  --  7.3*  --  5.8*   ALP unit/L 141 141  --  134  --  120   AST unit/L 53* 63*  --  60*  --  51*   ALT unit/L 22 26  --  26  --  23   Magnesium  mg/dL 1.6  --   --  1.8  --  1.8   Phosphorus Level mg/dL 2.5*  --   --  2.3*  --  2.4*       Vancomycin Administrations:  vancomycin given in the last 96 hours                     vancomycin (VANCOCIN) 1,000 mg in 0.9% NaCl 250 mL IVPB (admixture device) (mg) 1,000 mg New Bag 06/09/25 0600    vancomycin 1,500 mg in 0.9% NaCl 250 mL IVPB (admixture device) (mg) 1,500 mg New Bag 06/06/25 2043                    Microbiologic Results:  Microbiology Results (last 7 days)       Procedure Component Value Units Date/Time    Blood culture [[id number]]  (Normal) Collected: 06/09/25 0035    Order Status: Completed Specimen: Blood from Peripheral, Lower Arm, Left Updated: 06/10/25 0410     Blood Culture No Growth After 24 Hours    Blood culture [[id number]]  (Normal) Collected: 06/09/25 0027    Order Status: Completed Specimen: Blood from Peripheral, Lower Arm, Right Updated: 06/10/25 0410     Blood Culture No Growth After 24 Hours    Culture, body fluid - Bactec [[id number]]  (Normal) Collected: 06/09/25 1131    Order Status: Completed Specimen: Body Fluid from Thoracentesis Fluid Updated: 06/10/25 0204     Body Fluid Culture No Growth After 6 Hours    Gram stain [[id number]] Collected: 06/09/25 1131    Order Status: Completed Specimen: Respiratory from Pleural Fluid Updated: 06/09/25 2155     GRAM STAIN Rare WBC seen      No organisms seen    Blood culture [[id number]]  (Normal) Collected: 06/06/25 1634    Order Status: Completed Specimen: Blood from  Peripheral, Antecubital, Left Updated: 06/09/25 1900     Blood Culture No Growth After 72 Hours    Blood culture [[id number]]  (Normal) Collected: 06/06/25 1634    Order Status: Completed Specimen: Blood from Peripheral, Forearm, Left Updated: 06/09/25 1900     Blood Culture No Growth After 72 Hours    Afb Culture Stain [[id number]] Collected: 06/09/25 1131    Order Status: Sent Specimen: Respiratory from Pleural Fluid Updated: 06/09/25 1747    AFB culture [[id number]] Collected: 06/09/25 1131    Order Status: Resulted Specimen: Respiratory from Pleural Fluid Updated: 06/09/25 1747    Fungus culture [[id number]] Collected: 06/09/25 1131    Order Status: Sent Specimen: Respiratory from Pleural Fluid Updated: 06/09/25 1747

## 2025-06-10 NOTE — PLAN OF CARE
Patient is AAOx4.  Afebrile  RA  Telemetry monitoring remains in place.   Zosyn and vancomycin administered per order.   Liver bx results pending  BLE edema   PRN oxycodone administered   Call light remains within reach.   Instructed to call for assistance.   Bed is in lowest position with wheels locked.   Plan of care is ongoing.

## 2025-06-10 NOTE — CARE UPDATE
[facility]  [facility]phrology        Patient Name: Jhonny Diana   MRN: [id number]   Current Provider: Brian Ball DO  Primary Care Provider: Elo Bull MD   Admission Date: 6/6/2025   Hospital Day: 4  Bed: 60591/38142 A  Principal Problem: History of liver transplant            Nephrology Chart Review      Intake/Output Summary (Last 24 hours) at 6/10/2025 1627  Last data filed at 6/10/2025 1340  Gross per 24 hour   Intake 560 ml   Output 2400 ml   Net -1840 ml       Vitals:    06/10/25 0604 06/10/25 0750 06/10/25 1120 06/10/25 1305   BP:  116/64 (!) 103/49    BP Location:  Right arm Right arm    Patient Position:  Lying Lying    Pulse:  76 80    Resp: 18 17 19 16   Temp:  98.1 °F (36.7 °C) 98.1 °F (36.7 °C)    TempSrc:  Oral Oral    SpO2:  96% 96%    Weight:       Height:           Recent Labs   Lab 06/08/25  0430 06/09/25  0027 06/09/25  0735 06/10/25  0601   * 134* 133* 132*   K 2.9* 3.5 3.4* 2.9*   CL 94* 95 96 96   CO2 29 28 28 28   BUN 27* 29* 29* 30*   CREATININE 1.5* 1.6* 1.7* 1.7*   CALCIUM 7.8* 8.1* 8.1* 7.5*   PHOS 2.5*  --  2.3* 2.4*     - 40mEQ Kcl was given by the primary for the low potassium     - Stable renal functions  - Edema improving  - Will continue to follow    No other related issues identified. Please call Nephrology as needed; We will continue to follow.      Erasmo Krueger MD.  Clinical Nephrology Fellow, PGY-4  [facility] [address]

## 2025-06-10 NOTE — ASSESSMENT & PLAN NOTE
Patient's most recent potassium results are listed below.   Recent Labs     06/09/25  0027 06/09/25  0735 06/10/25  0601   K 3.5 3.4* 2.9*     Plan  - Replete potassium per protocol  - Monitor potassium Daily  - Patient's hypokalemia is stable

## 2025-06-10 NOTE — PROGRESS NOTES
[facility]  Progress Note    Patient Name: Jhonny Diana  MRN: [id number]  Patient Class: IP- Inpatient   Admission Date: 6/6/2025  Length of Stay: 4 days  Attending Physician: Brian Ball DO  Primary Care Provider: Elo Bull MD        Subjective     Principal Problem:History of liver transplant        HPI:   Per  note:    Mr. 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 His post-transplant period was notable for LAURA previously on HD, anastomotic stricture requiring dilatation and stenting, and ETOH relapse (6/2019). He has followed with Dr. Julio in Hepatology, though his last clinic visit was 5/30/23. He is now followed by Dr. Morgan in Transplant Clinic. Per Dr. Michel at [facility], he is only on Sirolimus 1mg daily (of note, this was held a short time after his hernia repair).    He was recently admitted to [facility] in [place] on 6/2 with SOB, jaundice, and anasarca. Since admit, he has been taking his home Sirolimis.    He was ultimately found to have a large right pleural effusion. He had a right US guided thoracentesis on 6/3 with removal of 1000 cc of reddish-brown otherwise nonturbid fluid. Thora fluid studies: Gluc 91, , pH 8, Prot 3.5,  with 15% PMN, RBC 20,6000. He also had ascites, Abdominal U/S: Only trace fluid right upper quadrant insufficient for ultrasound-guided drainage.     He was also found to be in LAURA with Cr of 1.98, which then went to 2.02, and has now improved to 1.8 again with IV diuresis (Lasix + Metolazone). Nephrology there was consulted. At admit: WBC 7.1, Hg 10.6, [id number], AST 46, ALT 28, TB 8.2, NH3 57, , INR 1.3. TB today has improved to 5.6.    Yesterday, unexpectedly his  blood cultures 2/2 turned positive for GPC. He was started on Vancomycin and Rocephin 2g iv daily. They do not have ID service there and have not done TTE or BRADEN. Given elevated TB and bacteremia, GI there recommended transfer to center with Hepatology to help manage him with history of XPL.    Current VS 6/4 at 4pm: T 98.0F, P 80, RR 80, /55.    CXR 6/2: Large right pleural effusion causing compressive atelectasis of most of the right lung. Left lung is clear.The trachea appears severely narrowed and the right mainstem bronchus is not well seen, raising the possibility of a mediastinal mass.     Overview/Hospital Course:  Initially presented to OSH due to new onset SOB, jaundice an anasarca. Found to have large right sided pleural effusion and atelectasis. 1L removed by thora on 6/3. Patient transferred for further hepatology eval and persistent R sided pleural effusion. Hepatology recommending TJ biopsy with portal pressures, done by IR 6/8, results pending.  Pulmonology consulted, s/p thoracentesis 6/9 with 1.5 L removed.  Given persistent fevers, restarted on empiric antibiotics (had resolved GPC during previous hospilization, abx ultimately discontinued as bacteremia was likely contaminant).  Per hepatology, most certainly as cirrhosis of his allograft, would not be re transplant candidate    Interval History: Seen this AM at bedside.  Febrile again overnight, Tmax 101.1.  Denies subjective fevers.  Endorsing dyspnea however improved from previous, no longer experiencing orthopnea.  Endorsing nonproductive cough.    Review of Systems  Objective:     Vital Signs (Most Recent):  Temp: 98.1 °F (36.7 °C) (06/10/25 1120)  Pulse: 80 (06/10/25 1120)  Resp: 19 (06/10/25 1120)  BP: (!) 103/49 (06/10/25 1120)  SpO2: 96 % (06/10/25 1120) Vital Signs (24h Range):  Temp:  [98.1 °F (36.7 °C)-101.1 °F (38.4 °C)] 98.1 °F (36.7 °C)  Pulse:  [] 80  Resp:  [17-19] 19  SpO2:  [92 %-96 %] 96 %  BP: (103-129)/(49-67)  103/49     Weight: 66.8 kg (147 lb 4.3 oz)  Body mass index is 23.77 kg/m².    Intake/Output Summary (Last 24 hours) at 6/10/2025 1231  Last data filed at 6/10/2025 0511  Gross per 24 hour   Intake 200 ml   Output 1800 ml   Net -1600 ml      Physical Exam  Vitals and nursing note reviewed.   Constitutional:       General: He is not in acute distress.     Appearance: He is ill-appearing.   HENT:      Head: Normocephalic.   Eyes:      General: Scleral icterus present.   Cardiovascular:      Rate and Rhythm: Regular rhythm. Tachycardia present.      Heart sounds: Normal heart sounds. No murmur heard.  Pulmonary:      Breath sounds: Examination of the right-lower field reveals decreased breath sounds. Decreased breath sounds present. No wheezing, rhonchi or rales.   Abdominal:      General: There is no distension.      Palpations: Abdomen is soft.      Tenderness: There is abdominal tenderness.   Musculoskeletal:      Right lower leg: Edema present.      Left lower leg: Edema present.   Skin:     Coloration: Skin is jaundiced.   Neurological:      General: No focal deficit present.      Mental Status: He is alert and oriented to person, place, and time.   Psychiatric:         Mood and Affect: Mood normal.         Behavior: Behavior normal.         MELD 3.0: 26 at 6/10/2025  6:01 AM  MELD-Na: 24 at 6/10/2025  6:01 AM  Calculated from:  Serum Creatinine: 1.7 mg/dL at 6/10/2025  6:01 AM  Serum Sodium: 132 mmol/L at 6/10/2025  6:01 AM  Total Bilirubin: 5.8 mg/dL at 6/10/2025  6:01 AM  Serum Albumin: 1.8 g/dL at 6/10/2025  6:01 AM  INR(ratio): 1.3 at 6/10/2025  6:01 AM  Age at listing (hypothetical): 34 years  Sex: Male at 6/10/2025  6:01 AM      Significant Labs:  CBC:  Recent Labs   Lab 06/09/25  0027 06/09/25  0718 06/10/25  0601   WBC 6.40 8.14 4.92   HGB 9.4* 9.7* 8.5*   HCT 27.5* 29.5* 25.8*   * 111* 80*     CMP:  Recent Labs   Lab 06/09/25  0027 06/09/25  0735 06/10/25  0601   * 133* 132*   K 3.5 3.4*  2.9*   CL 95 96 96   CO2 28 28 28   * 109 106   BUN 29* 29* 30*   CREATININE 1.6* 1.7* 1.7*   CALCIUM 8.1* 8.1* 7.5*   PROT 6.4 6.4 5.5*   ALBUMIN 2.3* 2.2* 1.8*   BILITOT 6.6* 7.3* 5.8*   ALKPHOS 141 134 120   AST 63* 60* 51*   ALT 26 26 23   ANIONGAP 11 9 8     PTINR:  Recent Labs   Lab 06/09/25  0718 06/10/25  0601   INR 1.3* 1.3*       Significant Procedures:   Dobutamine Stress Test with Color Flow: No results found. However, due to the size of the patient record, not all encounters were searched. Please check Results Review for a complete set of results.    thoracentesis      Assessment & Plan  Pleural effusion    Patient found to have large pleural effusion on imaging. I have personally reviewed and interpreted the following imaging: Xray. A thoracentesis was performed. Pleural fluid was sent for analysis. Labs reviewed, including see abve. Fluid previously most consistent with Transudate. Studies from 6/9 were exudative by Lights criteria which may have been skewed by diuretics. Most likely etiology includes Cirrhosis. Management to include Diuresis    -Presented with new onset SOB at OSH. Found to have Large right pleural effusion causing compressive atelectasis of most of the right lung on 6/2 CXR. Underwent thora  -Right US guided thoracentesis on 6/3 with removal of 1000 cc of reddish-brown otherwise nonturbid fluid. Thora fluid studies: Gluc 91, , pH 8, Prot 3.5,  with 15% PMN, RBC 20,6000.   -Previous studies consistent with transudative effusion. Likely from ascites   -6/9: Exudative by Light's criteria due to protein content  -CT chest with enlarged effusion compared to prior studies at this hospital  Plan:  - pulmonology initially deferring thoracentesis due to stable respiratory status.  Eventually performed on 06/09, 1.5 L removed  - f/u thora labs, cytology  - follow up cytology reported sent at OSH       History of liver  "transplant  Hyperbilirubinemia  Immunosuppression  Liver tx in 2017 2/2 Etoh cirrhosis. Follows with Dr. Morgan. Hx of anastomotic stricture requiring dilatation and stenting. Had been having uptrending bilirubin and MRCP ordered outpatient, done at [facility] as outpatient. Admitted to [facility] with Tbili of 8, ascites, transudative pleural effusion.   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 NOT given here.   S/p 25mg albumin and lasix 80mg IV evening of 6/7 with some improvement     Sepsis  This patient does have evidence of infective focus  My overall impression is sepsis without organ dysfunction.  Source: Intra-abdominal Infection  Antibiotics given-   Antibiotics (72h ago, onward)      Start     Stop Route Frequency Ordered    06/10/25 1330  vancomycin 750 mg in 0.9% NaCl 250 mL IVPB (admixture device)         -- IV Once 06/10/25 1228    06/09/25 0100  piperacillin-tazobactam (ZOSYN) 4.5 g in D5W 100 mL IVPB (MB+)         -- IV Every 8 hours (non-standard times) 06/08/25 2355    06/09/25 0053  vancomycin - pharmacy to dose  (vancomycin IVPB (PEDS and ADULTS))        Placed in "And" Linked Group    -- IV pharmacy to manage frequency 06/08/25 2355    06/06/25 2100  mupirocin 2 % ointment         06/11/25 2059 Nasl 2 times daily 06/06/25 1759          Latest lactate reviewed-  Recent Labs   Lab 06/09/25  0027   LACTATE 1.4     Organ dysfunction indicated by Acute kidney " injury and Acute liver injury    Will Not start Pressors- Levophed for MAP of 65  Source control achieved by: antibiotics  Acute kidney injury superimposed on CKD  Mostly resolved at this point.    Hx of LAURA on CKD at OSH with cr rising to 2.5. Most recently before transfer was 1.69. baseline ~ 1.6-1.7. Started on Lasix 80 IV daily, spironolactone 100mg PO daily  at OSH with minor improvement in edema.     - monitor UOP  - nephrology consulted, likely 2/2 diuretics/intravascular fluid shift vs vancomycin    LAURA is likely due to unclear. Baseline creatinine is 1.6-1.7. Most recent creatinine and eGFR are listed below.  Recent Labs     06/09/25  0027 06/09/25  0735 06/10/25  0601   CREATININE 1.6* 1.7* 1.7*   EGFRNORACEVR 57* 53* 53*      Plan  - LAURA is stable  - Avoid nephrotoxins and renally dose meds for GFR listed above  - Monitor urine output, serial BMP, and adjust therapy as needed  Opioid dependence  Chronic pain  Follows with pain hospitalist. Takes 30mg oxycodone QID   - continue oxycodone 30mg q6h PRN to avoid withdrawals while inpatient     Hypokalemia  Patient's most recent potassium results are listed below.   Recent Labs     06/09/25  0027 06/09/25  0735 06/10/25  0601   K 3.5 3.4* 2.9*     Plan  - Replete potassium per protocol  - Monitor potassium Daily  - Patient's hypokalemia is stable  Hyponatremia  Hyponatremia is likely due to Cirrhosis. The patient's most recent sodium results are listed below.  Recent Labs     06/09/25  0027 06/09/25  0735 06/10/25  0601   * 133* 132*     Plan  - Monitor sodium Daily.   - Patient hyponatremia is stable  CKD stage 3a, GFR 45-59 ml/min  Creatine stable for now. BMP reviewed- noted Estimated Creatinine Clearance: 54.7 mL/min (A) (based on SCr of 1.7 mg/dL (H)). according to latest data. Based on current GFR, CKD stage is stage 2 - GFR 60-89.  Monitor UOP and serial BMP and adjust therapy as needed. Renally dose meds. Avoid nephrotoxic medications and  procedures.    VTE Risk Mitigation (From admission, onward)           Ordered     IP VTE LOW RISK PATIENT  Once         06/06/25 1604     Place sequential compression device  Until discontinued         06/06/25 1604                    Discharge Planning   DERICK: 6/15/2025     Code Status: Full Code   Medical Readiness for Discharge Date:   Discharge Plan A: Home with family                        Brian Ball DO  Department of Hospital Medicine   [facility]

## 2025-06-10 NOTE — SUBJECTIVE & OBJECTIVE
Interval History:  Fever resolving.  Patient understandably tearful given news of recent biopsy.    Review of Systems  Objective:     Vital Signs (Most Recent):  Temp: 98.1 °F (36.7 °C) (06/10/25 1120)  Pulse: 80 (06/10/25 1120)  Resp: 19 (06/10/25 1120)  BP: (!) 103/49 (06/10/25 1120)  SpO2: 96 % (06/10/25 1120) Vital Signs (24h Range):  Temp:  [98.1 °F (36.7 °C)-101.1 °F (38.4 °C)] 98.1 °F (36.7 °C)  Pulse:  [] 80  Resp:  [17-19] 19  SpO2:  [92 %-96 %] 96 %  BP: (103-129)/(49-67) 103/49     Weight: 66.8 kg (147 lb 4.3 oz)  Body mass index is 23.77 kg/m².    Intake/Output Summary (Last 24 hours) at 6/10/2025 1231  Last data filed at 6/10/2025 0511  Gross per 24 hour   Intake 200 ml   Output 1800 ml   Net -1600 ml      Physical Exam  Vitals and nursing note reviewed.   Constitutional:       General: He is not in acute distress.     Appearance: He is ill-appearing.   HENT:      Head: Normocephalic.   Eyes:      General: Scleral icterus present.   Cardiovascular:      Rate and Rhythm: Regular rhythm. Tachycardia present.      Heart sounds: Normal heart sounds. No murmur heard.  Pulmonary:      Breath sounds: No decreased breath sounds, wheezing, rhonchi or rales.   Abdominal:      General: There is no distension.      Palpations: Abdomen is soft.      Tenderness: There is no abdominal tenderness.   Musculoskeletal:      Right lower leg: Edema present.      Left lower leg: Edema present.   Skin:     Coloration: Skin is jaundiced.   Neurological:      General: No focal deficit present.      Mental Status: He is alert and oriented to person, place, and time.   Psychiatric:         Mood and Affect: Mood normal.         Behavior: Behavior normal.         MELD 3.0: 26 at 6/10/2025  6:01 AM  MELD-Na: 24 at 6/10/2025  6:01 AM  Calculated from:  Serum Creatinine: 1.7 mg/dL at 6/10/2025  6:01 AM  Serum Sodium: 132 mmol/L at 6/10/2025  6:01 AM  Total Bilirubin: 5.8 mg/dL at 6/10/2025  6:01 AM  Serum Albumin: 1.8 g/dL at  6/10/2025  6:01 AM  INR(ratio): 1.3 at 6/10/2025  6:01 AM  Age at listing (hypothetical): 34 years  Sex: Male at 6/10/2025  6:01 AM      Significant Labs:  CBC:  Recent Labs   Lab 06/09/25  0027 06/09/25 0718 06/10/25  0601   WBC 6.40 8.14 4.92   HGB 9.4* 9.7* 8.5*   HCT 27.5* 29.5* 25.8*   * 111* 80*     CMP:  Recent Labs   Lab 06/09/25  0027 06/09/25  0735 06/10/25  0601   * 133* 132*   K 3.5 3.4* 2.9*   CL 95 96 96   CO2 28 28 28   * 109 106   BUN 29* 29* 30*   CREATININE 1.6* 1.7* 1.7*   CALCIUM 8.1* 8.1* 7.5*   PROT 6.4 6.4 5.5*   ALBUMIN 2.3* 2.2* 1.8*   BILITOT 6.6* 7.3* 5.8*   ALKPHOS 141 134 120   AST 63* 60* 51*   ALT 26 26 23   ANIONGAP 11 9 8     PTINR:  Recent Labs   Lab 06/09/25  0718 06/10/25  0601   INR 1.3* 1.3*       Significant Procedures:   Dobutamine Stress Test with Color Flow: No results found. However, due to the size of the patient record, not all encounters were searched. Please check Results Review for a complete set of results.    thoracentesis

## 2025-06-10 NOTE — ASSESSMENT & PLAN NOTE
Likely 2/2 decreased oncotic pressure due to hyopalbuminemia, inflammation/infection  Started on Lasix 80 IV daily, metolazone, spironolactone 100mg PO daily  at OSH with minor improvement in edema. NOT given here.   S/p 25mg albumin and lasix 80mg IV evening of 6/7 with some improvement

## 2025-06-10 NOTE — PLAN OF CARE
Pt AAOx4. Vitals stable. T max 101.1F - Dr. Gillette aware. PRN tylenol given as ordered. Zosyn Q8H continued. Liver biopsy completed 6/8, awaiting results. R neck biopsy site remains CDI. BLE remain edematous. SCDs O/N. PRN oxycodone administered for abdominal pain. Thora yesterday, awaiting fluid cultures. Bed in lowest locked position, call light and personal items in reach, verbalized understanding to call for assistance.

## 2025-06-10 NOTE — CHAPLAIN
"REASON FOR VISIT:  General Rounding    SUBJECTIVE:    -Snap shot of visit and/or what was said by the patient:    Pt was about to lie on his bed when [facility] arrived. Pt was open to talk the moment he was on his bed. Pt informed [facility] he has a history of liver issues. Pt said the medical team told him that his liver cirrhosis was caused by alcohol drinking which Pt argued was uncharitable from the medical team. Pt felt a bit unease when he was told of his liver disease and he began to wonder how he was going to take on the mann but he said " I was not really distressed with the news" I was only angry that the doctors would immediately point to alcohol as the cause of his liver disease even though it can be caused by other factors. Pt informed [facility] he is currently suffering from bilirubin" a condition that triggers juandice and the production of liver enzymes that causes his eyes to be yellowish which is a clear sign that his liver is not good at the moment.     OBJECTIVE:    -Observations of the Patient, and/or if Applicable Family Present:  Pt appears calm, oriented to person and place but with some lower degree of anxieties and some sadness though he flips it with an argument of feeling anxious but taking everything as it comes. Pt admitted he is somehow scared of death and burst out in tears. He said " I am not afraid of death but the people who have invested in me and if death comes he does not know how they would feel. The hard question about life and death seem to have moved him to tears.      ASSESSMENT:  -Spiritual Needs, Coping, and/or Distress  Pt is atheist without any Amish affiliations. Pt does not believe ti any general moral ways of doing things. Pt struggles to identify what his [ing mechanism is. Pt is currently writing a novel and that's one of his coping ways and his psycho social identity is realized on what he desires or plans to achieve which is the writing of his current novel he hopes to " publish.  Pt verbalize his frustrations that the liver disease is causing him some degree of anxiety  and he feels sometimes that the doctors do ot really listen to him. Pt feels connected to his own values and autonomy that is not hindered by any Sikh. Pt feels that his current condition has given him a new life balance that is assisting him to cope with his current illness.       PROVIDED & PLAN:    -What did the  do or Plan to do; Referrals Made if Applicable  Chp actively listened to  and provided compassionate care and presence. Chp plans to continue visiting Pt      Alin Castro M.Div., [facility] ext [phone]  Resident   Spiritual and Pastoral care Dept

## 2025-06-10 NOTE — ASSESSMENT & PLAN NOTE
Mostly resolved at this point.    Hx of LAURA on CKD at OSH with cr rising to 2.5. Most recently before transfer was 1.69. baseline ~ 1.6-1.7. Started on Lasix 80 IV daily, spironolactone 100mg PO daily  at OSH with minor improvement in edema.     - monitor UOP  - nephrology consulted, likely 2/2 diuretics/intravascular fluid shift vs vancomycin    LAURA is likely due to unclear. Baseline creatinine is 1.6-1.7. Most recent creatinine and eGFR are listed below.  Recent Labs     06/09/25  0027 06/09/25  0735 06/10/25  0601   CREATININE 1.6* 1.7* 1.7*   EGFRNORACEVR 57* 53* 53*      Plan  - LAURA is stable  - Avoid nephrotoxins and renally dose meds for GFR listed above  - Monitor urine output, serial BMP, and adjust therapy as needed

## 2025-06-10 NOTE — ASSESSMENT & PLAN NOTE
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 Follows with Dr. Morgan. Hx of anastomotic stricture requiring dilatation and stenting. Had been having uptrending bilirubin and MRCP ordered outpatient, done at [facility] as outpatient. Admitted to [facility] with Tbili of 8, ascites, transudative pleural effusion.   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  Plan:  - hepatology consulted > s/p TJ liver biopsy with portal pressures on 06/09, follow up results   - not a retransplant candidate  - f/u PETH  - f/u MRCP results from OSH   - continue sirolimus 1mg BID  - valaclovir PPx continued      Progress Notes by Gerardo Brownlee MD at 10/07/18 10:38 AM     Author:  Gerardo Brownlee MD Service:  (none) Author Type:  Physician     Filed:  10/07/18 01:42 PM Encounter Date:  10/7/2018 Status:  Signed     :  Gerardo Brownlee MD (Physician)            CC:[BA1.1T]   Chief Complaint     Patient presents with     • Cough      x Thursday night[BA1.2T]        Gerardo Gandara is a 54 year old male  with a[BA1.1T] 4d[BA1.3M] history of:    cough ([BA1.1T]+[BA1.3M])  rhinorrhea or stuffiness ([BA1.1T]+[BA1.3M])  sore throat. ([BA1.1T]+[BA1.3M])  Fever([BA1.1T]-[BA1.3M])  diarrhea ([BA1.1T]-[BA1.3M])  vomiting ([BA1.1T]-[BA1.3M])  Nausea ([BA1.1T]-[BA1.3M])  abdominal pain ([BA1.1T]-[BA1.3M])  Chest tightness ([BA1.1T]-[BA1.3M])  Sputum ([BA1.1T]-[BA1.3M])  Ear pain([BA1.1T]-[BA1.3M])[BA1.1T]    No faical pain    Patient has history of polycythemia vera and had a splenectomy years ago.  He had a Prevnar last year he states[BA1.3M]    No Known Allergies[BA1.2T]    Current Outpatient Prescriptions     Medication  Sig   • JAKAFI 25 MG TABS TAKE 1 TABLET (25 MG) BY MOUTH TWICE DAILY. MAY TAKE WITH OR WITHOUT FOOD. AVOID GRAPEFRUIT PRODUCTS.   • hydroxyurea (HYDREA) 500 MG Cap TAKE ONE CAPSULE BY MOUTH TWICE A DAY   • valacyclovir (VALTREX) 500 MG tablet TAKE 1 TAB BY MOUTH DAILY.   • aspirin 81 MG EC tablet Take 81 mg by mouth daily.       Patient Active Problem List    Diagnosis    • Tru's disease   • Sebaceous cyst   • Polycythemia vera   • Lesion of ulnar nerve   • Cervical spondylosis without myelopathy   • Degeneration of cervical intervertebral disc   • Spinal stenosis in cervical region   • Intervertebral cervical disc disorder with myelopathy, cervical region   • Ankylosing spondylitis   • Posterior synechiae   • Acute, but ill-defined, cerebrovascular disease   • Disorder of iron metabolism   • Splenomegaly   • History of splenectomy   • Marital conflict   • Myeloproliferative disease   •  Myeloproliferative disorder   • Primary osteoarthritis of right hip   • Recurrent genital herpes       FH no one is ill      OBJECTIVE:   /84  Pulse 53  Temp 96 °F (35.6 °C) (Tympanic)   Resp 16  SpO2 100%  Gen: alert, fully oriented and[BA1.1T] NAD[BA1.3M]  HEENT:  No redness or d/c of the eyes..  (+)erythema of the turbinates   Erythema throat (+)    Exudate (-)  Ears- canals clear, normal LM and no redness or bulging of TMs  NECK:  Supple, no mass. Nontender nodes  CHEST: wheezing ([BA1.1T]-[BA1.3M]), air entry decreased  ([BA1.1T]-[BA1.3M]), adventital sounds ([BA1.1T]-[BA1.3M])prolonged exp ([BA1.1T]-[BA1.3M])  HEART: regular, normal S1,S2, no murmur[BA1.1T]  Rapid strep test is negative[BA1.3T]      ASSESSMENT:   URI    PLAN:   OTC meds[BA1.1T]      Benzonatate as  needed for cough up to three times per day. Warned to keep this med away from children or pets because the pills could eaten  and should not be sucked on or chewed.  I recommend that the pills be taken over the sink so that if they fall, it will go down the drain instead of being found by a child or pet.     Patient should immediately recap the bottle.    Biaxin as ordered. Eat yogurt or take a probiotic to protect intestinal and genital tracts.  Place[BA1.3T]d[BA1.3M] in file. Patient will call if needed[BA1.3T] for discolored sputum[BA1.3M]. Void 30 days[BA1.3T].  Patient was asked not to use the Biaxin unless he gets sicker because of the risk of C. difficile and antibiotic associated diarrhea and she not use it if it is unnecessary.[BA1.3M]  TO ER if worse with fever,chest pain  and/or shortness of breath[BA1.1T]      Pt[BA1.3M] should call us or the PCP if patient  is  not improving or getting worse.    AVS given    Additional oral discharge instructions given and discussed with the[BA1.1T] PT[BA1.3M].[BA1.1T]    PT[BA1.3M] voiced understanding of instructions given and  felt that  we accomplished everything[BA1.1T] he[BA1.3M] needed  to do today. I asked[BA1.1T] him[BA1.3M] to call me if there are any further questions or concerns.     Electronically Signed by:    Gerardo Brownlee MD , 10/7/2018[BA1.1T]        Revision History        User Key Date/Time User Provider Type Action    > BA1.3 10/07/18 01:42 PM Gerardo Brownlee MD Physician Sign     BA1.2 10/07/18 10:40 AM Gerardo Brownlee MD Physician      BA1.1 10/07/18 10:38 AM Gerardo Brownlee MD Physician     M - Manual, T - Template

## 2025-06-11 LAB
ABSOLUTE EOSINOPHIL (OHS): 0.44 K/UL
ABSOLUTE MONOCYTE (OHS): 0.65 K/UL (ref 0.3–1)
ABSOLUTE NEUTROPHIL COUNT (OHS): 2.07 K/UL (ref 1.8–7.7)
ALBUMIN SERPL BCP-MCNC: 1.7 G/DL (ref 3.5–5.2)
ALP SERPL-CCNC: 124 UNIT/L (ref 40–150)
ALT SERPL W/O P-5'-P-CCNC: 22 UNIT/L (ref 10–44)
ANION GAP (OHS): 9 MMOL/L (ref 8–16)
AST SERPL-CCNC: 52 UNIT/L (ref 11–45)
BACTERIA BLD CULT: NORMAL
BACTERIA BLD CULT: NORMAL
BASOPHILS # BLD AUTO: 0.04 K/UL
BASOPHILS NFR BLD AUTO: 0.8 %
BILIRUB DIRECT SERPL-MCNC: 4 MG/DL (ref 0.1–0.3)
BILIRUB SERPL-MCNC: 5.1 MG/DL (ref 0.1–1)
BUN SERPL-MCNC: 27 MG/DL (ref 6–20)
CALCIUM SERPL-MCNC: 7.5 MG/DL (ref 8.7–10.5)
CHLORIDE SERPL-SCNC: 96 MMOL/L (ref 95–110)
CO2 SERPL-SCNC: 27 MMOL/L (ref 23–29)
CREAT SERPL-MCNC: 1.8 MG/DL (ref 0.5–1.4)
ERYTHROCYTE [DISTWIDTH] IN BLOOD BY AUTOMATED COUNT: 14 % (ref 11.5–14.5)
ESTROGEN SERPL-MCNC: NORMAL PG/ML
GFR SERPLBLD CREATININE-BSD FMLA CKD-EPI: 50 ML/MIN/1.73/M2
GLUCOSE SERPL-MCNC: 92 MG/DL (ref 70–110)
HCT VFR BLD AUTO: 23.8 % (ref 40–54)
HGB BLD-MCNC: 8 GM/DL (ref 14–18)
IMM GRANULOCYTES # BLD AUTO: 0.04 K/UL (ref 0–0.04)
IMM GRANULOCYTES NFR BLD AUTO: 0.8 % (ref 0–0.5)
INR PPP: 1.2 (ref 0.8–1.2)
INSULIN SERPL-ACNC: NORMAL U[IU]/ML
LAB AP CLINICAL INFORMATION: NORMAL
LAB AP GROSS DESCRIPTION: NORMAL
LAB AP NON-GYN INTERPRETATION SPECIMEN 1: NORMAL
LAB AP PERFORMING LOCATION(S): NORMAL
LYMPHOCYTES # BLD AUTO: 1.63 K/UL (ref 1–4.8)
MAGNESIUM SERPL-MCNC: 1.8 MG/DL (ref 1.6–2.6)
MCH RBC QN AUTO: 33.5 PG (ref 27–31)
MCHC RBC AUTO-ENTMCNC: 33.6 G/DL (ref 32–36)
MCV RBC AUTO: 100 FL (ref 82–98)
NUCLEATED RBC (/100WBC) (OHS): 0 /100 WBC
PHOSPHATE SERPL-MCNC: 2.3 MG/DL (ref 2.7–4.5)
PLATELET # BLD AUTO: 76 K/UL (ref 150–450)
PLPETH BLD-MCNC: <10 NG/ML
PMV BLD AUTO: 9.8 FL (ref 9.2–12.9)
POPETH BLD-MCNC: <10 NG/ML
POTASSIUM SERPL-SCNC: 3 MMOL/L (ref 3.5–5.1)
PROT SERPL-MCNC: 5.4 GM/DL (ref 6–8.4)
PROTHROMBIN TIME: 13.2 SECONDS (ref 9–12.5)
RBC # BLD AUTO: 2.39 M/UL (ref 4.6–6.2)
RELATIVE EOSINOPHIL (OHS): 9 %
RELATIVE LYMPHOCYTE (OHS): 33.5 % (ref 18–48)
RELATIVE MONOCYTE (OHS): 13.3 % (ref 4–15)
RELATIVE NEUTROPHIL (OHS): 42.6 % (ref 38–73)
SIROLIMUS BLD-MCNC: 5.8 NG/ML (ref 4–20)
SODIUM SERPL-SCNC: 132 MMOL/L (ref 136–145)
TOXICOLOGIST REVIEW: NORMAL
VANCOMYCIN SERPL-MCNC: 15.9 UG/ML (ref ?–80)
WBC # BLD AUTO: 4.87 K/UL (ref 3.9–12.7)

## 2025-06-11 PROCEDURE — 63600175 PHARM REV CODE 636 W HCPCS

## 2025-06-11 PROCEDURE — 25000003 PHARM REV CODE 250: Performed by: HOSPITALIST

## 2025-06-11 PROCEDURE — 25000003 PHARM REV CODE 250

## 2025-06-11 PROCEDURE — 25000003 PHARM REV CODE 250: Performed by: STUDENT IN AN ORGANIZED HEALTH CARE EDUCATION/TRAINING PROGRAM

## 2025-06-11 PROCEDURE — 63600175 PHARM REV CODE 636 W HCPCS: Performed by: HOSPITALIST

## 2025-06-11 PROCEDURE — 80202 ASSAY OF VANCOMYCIN: CPT | Performed by: STUDENT IN AN ORGANIZED HEALTH CARE EDUCATION/TRAINING PROGRAM

## 2025-06-11 PROCEDURE — 36415 COLL VENOUS BLD VENIPUNCTURE: CPT

## 2025-06-11 PROCEDURE — 85025 COMPLETE CBC W/AUTO DIFF WBC: CPT

## 2025-06-11 PROCEDURE — 80195 ASSAY OF SIROLIMUS: CPT | Performed by: STUDENT IN AN ORGANIZED HEALTH CARE EDUCATION/TRAINING PROGRAM

## 2025-06-11 PROCEDURE — 82248 BILIRUBIN DIRECT: CPT | Performed by: INTERNAL MEDICINE

## 2025-06-11 PROCEDURE — 84100 ASSAY OF PHOSPHORUS: CPT

## 2025-06-11 PROCEDURE — 83735 ASSAY OF MAGNESIUM: CPT

## 2025-06-11 PROCEDURE — 99233 SBSQ HOSP IP/OBS HIGH 50: CPT | Mod: GC,,, | Performed by: INTERNAL MEDICINE

## 2025-06-11 PROCEDURE — 20600001 HC STEP DOWN PRIVATE ROOM

## 2025-06-11 PROCEDURE — 80053 COMPREHEN METABOLIC PANEL: CPT

## 2025-06-11 PROCEDURE — 85610 PROTHROMBIN TIME: CPT

## 2025-06-11 RX ORDER — POTASSIUM CHLORIDE 20 MEQ/1
40 TABLET, EXTENDED RELEASE ORAL ONCE
Status: COMPLETED | OUTPATIENT
Start: 2025-06-11 | End: 2025-06-11

## 2025-06-11 RX ADMIN — SIROLIMUS 1 MG: 1 TABLET ORAL at 08:06

## 2025-06-11 RX ADMIN — PIPERACILLIN SODIUM AND TAZOBACTAM SODIUM 4.5 G: 4; .5 INJECTION, POWDER, FOR SOLUTION INTRAVENOUS at 08:06

## 2025-06-11 RX ADMIN — DIAZEPAM 5 MG: 5 TABLET ORAL at 08:06

## 2025-06-11 RX ADMIN — POTASSIUM CHLORIDE 40 MEQ: 1500 TABLET, EXTENDED RELEASE ORAL at 08:06

## 2025-06-11 RX ADMIN — OXYCODONE HYDROCHLORIDE 30 MG: 10 TABLET ORAL at 07:06

## 2025-06-11 RX ADMIN — MUPIROCIN: 20 OINTMENT TOPICAL at 08:06

## 2025-06-11 RX ADMIN — OXYCODONE HYDROCHLORIDE 30 MG: 10 TABLET ORAL at 01:06

## 2025-06-11 RX ADMIN — PIPERACILLIN SODIUM AND TAZOBACTAM SODIUM 4.5 G: 4; .5 INJECTION, POWDER, FOR SOLUTION INTRAVENOUS at 01:06

## 2025-06-11 NOTE — CARE UPDATE
[facility]  Nephrology        Patient Name: Jhonny Diana   MRN: [id number]   Current Provider: Brian Ball DO  Primary Care Provider: Elo Bull MD   Admission Date: 6/6/2025   Hospital Day: 5  Bed: 07316/88427 A  Principal Problem: History of liver transplant            Nephrology Chart Review      Intake/Output Summary (Last 24 hours) at 6/11/2025 1643  Last data filed at 6/11/2025 1313  Gross per 24 hour   Intake 1093.63 ml   Output 1320 ml   Net -226.37 ml       Vitals:    06/11/25 1145 06/11/25 1313 06/11/25 1504 06/11/25 1551   BP: (!) 118/57   (!) 107/46   BP Location:       Patient Position:       Pulse: 93  90 90   Resp: 18 18  18   Temp: 99.1 °F (37.3 °C)   99.7 °F (37.6 °C)   TempSrc: Oral   Oral   SpO2: 97%   96%   Weight:       Height:           Recent Labs   Lab 06/09/25  0735 06/10/25  0601 06/11/25  0542   * 132* 132*   K 3.4* 2.9* 3.0*   CL 96 96 96   CO2 28 28 27   BUN 29* 30* 27*   CREATININE 1.7* 1.7* 1.8*   CALCIUM 8.1* 7.5* 7.5*   PHOS 2.3* 2.4* 2.3*       - BL leg edema improving   - Will continue to follow    No other related issues identified. Please call Nephrology as needed; We will continue to follow.      Erasmo Krueger MD.  Clinical Nephrology Fellow, PGY-4  [facility], [address]

## 2025-06-11 NOTE — ASSESSMENT & PLAN NOTE
Creatine stable for now. BMP reviewed- noted Estimated Creatinine Clearance: 51.7 mL/min (A) (based on SCr of 1.8 mg/dL (H)). according to latest data. Based on current GFR, CKD stage is stage 2 - GFR 60-89.  Monitor UOP and serial BMP and adjust therapy as needed. Renally dose meds. Avoid nephrotoxic medications and procedures.

## 2025-06-11 NOTE — ASSESSMENT & PLAN NOTE
Hyponatremia is likely due to Cirrhosis. The patient's most recent sodium results are listed below.  Recent Labs     06/09/25  0735 06/10/25  0601 06/11/25  0542   * 132* 132*     Plan  - Monitor sodium Daily.   - Patient hyponatremia is stable

## 2025-06-11 NOTE — PROGRESS NOTES
[facility] - Transplant [facility] Medicine  Progress Note    Patient Name: Jhonny Diana  MRN: [id number]  Patient Class: IP- Inpatient   Admission Date: 6/6/2025  Length of Stay: 5 days  Attending Physician: Brian Ball DO  Primary Care Provider: Elo Bull MD        Subjective     Principal Problem:History of liver transplant        HPI:   Per  note:    Mr. 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 His post-transplant period was notable for LAURA previously on HD, anastomotic stricture requiring dilatation and stenting, and ETOH relapse (6/2019). He has followed with Dr. Julio in Hepatology, though his last clinic visit was 5/30/23. He is now followed by Dr. Morgan in Transplant Clinic. Per Dr. Michel at [facility], he is only on Sirolimus 1mg daily (of note, this was held a short time after his hernia repair).    He was recently admitted to [facility] in [place] on 6/2 with SOB, jaundice, and anasarca. Since admit, he has been taking his home Sirolimis.    He was ultimately found to have a large right pleural effusion. He had a right US guided thoracentesis on 6/3 with removal of 1000 cc of reddish-brown otherwise nonturbid fluid. Thora fluid studies: Gluc 91, , pH 8, Prot 3.5,  with 15% PMN, RBC 20,6000. He also had ascites, Abdominal U/S: Only trace fluid right upper quadrant insufficient for ultrasound-guided drainage.     He was also found to be in LAURA with Cr of 1.98, which then went to 2.02, and has now improved to 1.8 again with IV diuresis (Lasix + Metolazone). Nephrology there was consulted. At admit: WBC 7.1, Hg 10.6, [id number], AST 46, ALT 28, TB 8.2, NH3 57, , INR 1.3. TB today has improved to 5.6.    Yesterday, unexpectedly his  blood cultures 2/2 turned positive for GPC. He was started on Vancomycin and Rocephin 2g iv daily. They do not have ID service there and have not done TTE or BRADEN. Given elevated TB and bacteremia, GI there recommended transfer to center with Hepatology to help manage him with history of XPL.    Current VS 6/4 at 4pm: T 98.0F, P 80, RR 80, /55.    CXR 6/2: Large right pleural effusion causing compressive atelectasis of most of the right lung. Left lung is clear.The trachea appears severely narrowed and the right mainstem bronchus is not well seen, raising the possibility of a mediastinal mass.     Overview/Hospital Course:  Initially presented to OSH due to new onset SOB, jaundice an anasarca. Found to have large right sided pleural effusion and atelectasis. 1L removed by thora on 6/3. Patient transferred for further hepatology eval and persistent R sided pleural effusion.  Pulmonology consulted, s/p thoracentesis 6/9 with 1.5 L removed.  Given persistent fevers, restarted on empiric antibiotics (had resolved GPC during previous hospilization, abx ultimately discontinued as bacteremia was likely contaminant). Antibiotics once again discontinued as pt asymptomatic with no clear source.  Per hepatology, most certainly as cirrhosis of his allograft, would not be re transplant candidate. Hepatology recommending TJ biopsy with portal pressures, done by IR 6/8, results pending. Nephrology has been on board for LAURA and edema, stable from this standpoint.    Interval History: Seen this AM at bedside.  Afebrile overnight.  Denies subjective fevers.  Dyspnea has improved, no longer experiencing orthopnea.  Lower extremity edema also improving along with abdominal distention.  Endorsing nonproductive cough.    Review of Systems  Objective:     Vital Signs (Most Recent):  Temp: 98.1 °F (36.7 °C) (06/10/25 1120)  Pulse: 80 (06/10/25 1120)  Resp: 19 (06/10/25 1120)  BP: (!) 103/49 (06/10/25 1120)  SpO2: 96 % (06/10/25 1120)  Vital Signs (24h Range):  Temp:  [98.1 °F (36.7 °C)-101.1 °F (38.4 °C)] 98.1 °F (36.7 °C)  Pulse:  [] 80  Resp:  [17-19] 19  SpO2:  [92 %-96 %] 96 %  BP: (103-129)/(49-67) 103/49     Weight: 66.8 kg (147 lb 4.3 oz)  Body mass index is 23.77 kg/m².    Intake/Output Summary (Last 24 hours) at 6/10/2025 1231  Last data filed at 6/10/2025 0511  Gross per 24 hour   Intake 200 ml   Output 1800 ml   Net -1600 ml      Physical Exam  Vitals and nursing note reviewed.   Constitutional:       General: He is not in acute distress.     Appearance: He is ill-appearing.   HENT:      Head: Normocephalic.   Eyes:      General: Scleral icterus present.   Cardiovascular:      Rate and Rhythm: Regular rhythm. Tachycardia present.      Heart sounds: Normal heart sounds. No murmur heard.  Pulmonary:      Breath sounds: Examination of the right-lower field reveals decreased breath sounds. Decreased breath sounds present. No wheezing, rhonchi or rales.   Abdominal:      General: There is no distension.      Palpations: Abdomen is soft.      Tenderness: There is abdominal tenderness.   Musculoskeletal:      Right lower leg: Edema present.      Left lower leg: Edema present.   Skin:     Coloration: Skin is jaundiced.   Neurological:      General: No focal deficit present.      Mental Status: He is alert and oriented to person, place, and time.   Psychiatric:         Mood and Affect: Mood normal.         Behavior: Behavior normal.         MELD 3.0: 26 at 6/10/2025  6:01 AM  MELD-Na: 24 at 6/10/2025  6:01 AM  Calculated from:  Serum Creatinine: 1.7 mg/dL at 6/10/2025  6:01 AM  Serum Sodium: 132 mmol/L at 6/10/2025  6:01 AM  Total Bilirubin: 5.8 mg/dL at 6/10/2025  6:01 AM  Serum Albumin: 1.8 g/dL at 6/10/2025  6:01 AM  INR(ratio): 1.3 at 6/10/2025  6:01 AM  Age at listing (hypothetical): 34 years  Sex: Male at 6/10/2025  6:01 AM      Significant Labs:  CBC:  Recent Labs   Lab 06/09/25  0027 06/09/25  0718 06/10/25  0601   WBC 6.40 8.14  4.92   HGB 9.4* 9.7* 8.5*   HCT 27.5* 29.5* 25.8*   * 111* 80*     CMP:  Recent Labs   Lab 06/09/25  0027 06/09/25  0735 06/10/25  0601   * 133* 132*   K 3.5 3.4* 2.9*   CL 95 96 96   CO2 28 28 28   * 109 106   BUN 29* 29* 30*   CREATININE 1.6* 1.7* 1.7*   CALCIUM 8.1* 8.1* 7.5*   PROT 6.4 6.4 5.5*   ALBUMIN 2.3* 2.2* 1.8*   BILITOT 6.6* 7.3* 5.8*   ALKPHOS 141 134 120   AST 63* 60* 51*   ALT 26 26 23   ANIONGAP 11 9 8     PTINR:  Recent Labs   Lab 06/09/25  0718 06/10/25  0601   INR 1.3* 1.3*       Significant Procedures:   Dobutamine Stress Test with Color Flow: No results found. However, due to the size of the patient record, not all encounters were searched. Please check Results Review for a complete set of results.    thoracentesis      Assessment & Plan  Pleural effusion    Patient found to have large pleural effusion on imaging. I have personally reviewed and interpreted the following imaging: Xray. A thoracentesis was performed. Pleural fluid was sent for analysis. Labs reviewed, including see abve. Fluid previously most consistent with Transudate. Studies from 6/9 were exudative by Lights criteria which may have been skewed by diuretics. Most likely etiology includes Cirrhosis. Management to include Diuresis    -Presented with new onset SOB at OSH. Found to have Large right pleural effusion causing compressive atelectasis of most of the right lung on 6/2 CXR. Underwent thora  -Right US guided thoracentesis on 6/3 with removal of 1000 cc of reddish-brown otherwise nonturbid fluid. Thora fluid studies: Gluc 91, , pH 8, Prot 3.5,  with 15% PMN, RBC 20,6000.   -Previous studies consistent with transudative effusion. Likely from ascites  -CT chest with enlarged effusion compared to prior studies at this hospital   -6/9: Exudative by Light's criteria due to protein content. Possibly confounded due to diuretic use  -per pulmonology, may be non chylous pleural effusion 2/2  sirolimus given effusion coinciding with pt restarting the med in April  Plan:  - pulmonology initially deferring thoracentesis due to stable respiratory status.  Eventually performed on 06/09, 1.5 L removed  - f/u thora labs, cytology  - follow up cytology reported sent at OSH       History of liver transplant  Hyperbilirubinemia  Immunosuppression  Liver tx in 2017 2/2 Etoh cirrhosis. Follows with Dr. Morgan. Hx of anastomotic stricture requiring dilatation and stenting. Had been having uptrending bilirubin and MRCP ordered outpatient, done at Osh as outpatient. Admitted to Osh with Tbili of 8, ascites, transudative pleural effusion.   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  - PETH negative  Plan:  - hepatology consulted > s/p TJ liver biopsy with portal pressures on 06/09, follow up results   - not a retransplant candidate  - f/u MRCP results from OSH   - continue sirolimus 1mg BID  - valaclovir PPx continued     Ascites  Lower extremity edema  Likely 2/2 decreased oncotic pressure due to hyopalbuminemia, inflammation/infection  Started on Lasix 80 IV daily, metolazone, spironolactone 100mg PO daily  at OSH with minor improvement in edema. NOT given here.   S/p 25mg albumin and lasix 80mg IV evening of 6/7 with some improvement     Sepsis  This patient does have evidence of infective focus  My overall impression is sepsis without organ dysfunction.  Source: Intra-abdominal Infection  Given lack of symptom, resolution of fevers, negative workup, discontinue antibiotics  Acute kidney injury superimposed on CKD  Mostly resolved at this point.    Hx of LAURA on CKD at OSH with cr rising to 2.5. Most recently before transfer was 1.69. baseline ~ 1.6-1.7. Started on Lasix 80 IV daily, spironolactone 100mg PO daily  at OSH with minor improvement in edema.     -currently stable  - monitor UOP  - nephrology consulted, likely 2/2  diuretics/intravascular fluid shift vs vancomycin    LAURA is likely due to unclear. Baseline creatinine is 1.6-1.7. Most recent creatinine and eGFR are listed below.  Recent Labs     06/09/25  0735 06/10/25  0601 06/11/25  0542   CREATININE 1.7* 1.7* 1.8*   EGFRNORACEVR 53* 53* 50*      Plan  - LAURA is stable  - Avoid nephrotoxins and renally dose meds for GFR listed above  - Monitor urine output, serial BMP, and adjust therapy as needed  Opioid dependence  Chronic pain  Follows with pain hospitalist. Takes 30mg oxycodone QID   - continue oxycodone 30mg q6h PRN to avoid withdrawals while inpatient     Hypokalemia  Patient's most recent potassium results are listed below.   Recent Labs     06/09/25  0735 06/10/25  0601 06/11/25  0542   K 3.4* 2.9* 3.0*     Plan  - Replete potassium per protocol  - Monitor potassium Daily  - Patient's hypokalemia is stable  Hyponatremia  Hyponatremia is likely due to Cirrhosis. The patient's most recent sodium results are listed below.  Recent Labs     06/09/25  0735 06/10/25  0601 06/11/25  0542   * 132* 132*     Plan  - Monitor sodium Daily.   - Patient hyponatremia is stable  CKD stage 3a, GFR 45-59 ml/min  Creatine stable for now. BMP reviewed- noted Estimated Creatinine Clearance: 51.7 mL/min (A) (based on SCr of 1.8 mg/dL (H)). according to latest data. Based on current GFR, CKD stage is stage 2 - GFR 60-89.  Monitor UOP and serial BMP and adjust therapy as needed. Renally dose meds. Avoid nephrotoxic medications and procedures.    VTE Risk Mitigation (From admission, onward)           Ordered     IP VTE LOW RISK PATIENT  Once         06/06/25 1604     Place sequential compression device  Until discontinued         06/06/25 1604                    Discharge Planning   DERICK: 6/15/2025     Code Status: Full Code   Medical Readiness for Discharge Date:   Discharge Plan A: Home with family                        Brian Ball DO  Department of Hospital Medicine   [facility] -  Transplant Stepdown

## 2025-06-11 NOTE — PROGRESS NOTES
[facility] Progress Note      Attending: Brian Ball DO   Admit Date: 6/6/2025  Today's Date: 06/11/2025    SUBJECTIVE:     Interval History:   PETH<10.     Scheduled Medications:    diazePAM  5 mg Oral QHS    sirolimus  1 mg Oral Daily       PRN Medications:     Current Facility-Administered Medications:     acetaminophen, 650 mg, Oral, Q8H PRN    albuterol-ipratropium, 3 mL, Nebulization, Q4H PRN    aluminum-magnesium hydroxide-simethicone, 30 mL, Oral, QID PRN    bisacodyL, 10 mg, Rectal, Daily PRN    dextrose 50%, 12.5 g, Intravenous, PRN    dextrose 50%, 25 g, Intravenous, PRN    glucagon (human recombinant), 1 mg, Intramuscular, PRN    glucose, 16 g, Oral, PRN    glucose, 24 g, Oral, PRN    melatonin, 6 mg, Oral, Nightly PRN    naloxone, 0.02 mg, Intravenous, PRN    ondansetron, 4 mg, Intravenous, Q8H PRN    oxyCODONE, 30 mg, Oral, Q6H PRN    prochlorperazine, 5 mg, Intravenous, Q6H PRN    senna-docusate, 1 tablet, Oral, BID PRN    OBJECTIVE:     Vital Signs Trends/Hx Reviewed  Vitals:    06/11/25 0810 06/11/25 1059 06/11/25 1145 06/11/25 1313   BP: (!) 111/51  (!) 118/57    BP Location:       Patient Position:       Pulse: 95 99 93    Resp: 18  18 18   Temp: 99.7 °F (37.6 °C)  99.1 °F (37.3 °C)    TempSrc: Oral  Oral    SpO2: 96%  97%    Weight:       Height:             Physical Exam  Vitals reviewed.   Constitutional:       General: He is not in acute distress.     Appearance: He is not diaphoretic.   Eyes:      General: Scleral icterus present.   Cardiovascular:      Rate and Rhythm: Normal rate.   Pulmonary:      Effort: Pulmonary effort is normal.   Abdominal:      General: There is no distension.      Tenderness: There is no abdominal tenderness.   Neurological:      Mental Status: He is alert.          Laboratory:  Lab results in last 24 hours reviewed.     MELD 3.0: 25 at 6/11/2025  5:42 AM  MELD-Na: 23 at 6/11/2025  5:42 AM  Calculated from:  Serum Creatinine: 1.8 mg/dL at  6/11/2025  5:42 AM  Serum Sodium: 132 mmol/L at 6/11/2025  5:42 AM  Total Bilirubin: 5.1 mg/dL at 6/11/2025  5:42 AM  Serum Albumin: 1.7 g/dL at 6/11/2025  5:42 AM  INR(ratio): 1.2 at 6/11/2025  5:42 AM  Age at listing (hypothetical): 34 years  Sex: Male at 6/11/2025  5:42 AM      ASSESSMENT & RECOMMENDATIONS   34M with alcoholic hepatitis s/p liver transplant 10/19/2017 on Sirolimus 1 mg daily with post transplant course complicated by alcohol relapse 2019 (most recent PETH 9/2024 <10), anastomotic stricture requiring stenting, LAURA previously on HD followed by Dr. Morgan, umbilical hernia repair 11/2022, open L inguinal hernia repair 1/2025 and R inguinal hernia repair 4/2025, persistently elevated LFTs since most recent hernia repair who initially presented to OSH with SOB and jaundice, found to have a large R pleural effusion s/p thoracentesis, elevated liver enzymes and LAURA, ultimately [place] [facility] [place]06 for higher level of care. Hepatology consulted for IS management and hyperbilirubinemia. We recommended liver bx which was completed on 6/8, HVPG 11 concerning for portal hypertension. He spiked a fever on 6/8 to 101F, started on broad spectrum abx. He underwent repeat thoracentesis with Pulm on 6/9, 1.5 L removed, exudative based on light's criteria. Pulmonology thinks the effusion could be related to use of Sirolimus as there has been some data with development of effusions. PETH <10. He is s/p TJ liver bx, awaiting formal path.     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 Will review bx in path conference on 6/12.   -Agree with infectious workup and broad spectrum abx.   -Continue Sirolimus 1mg daily with daily AM sirolimus  levels.  -CMP, INR daily.        Thank you for allowing us to participate in the care of this patient. Please call with questions.      Roya Lizarraga MD  Gastroenterology Fellow, PGY-V  [facility]

## 2025-06-11 NOTE — ASSESSMENT & PLAN NOTE
Mostly resolved at this point.    Hx of LAURA on CKD at OSH with cr rising to 2.5. Most recently before transfer was 1.69. baseline ~ 1.6-1.7. Started on Lasix 80 IV daily, spironolactone 100mg PO daily  at OSH with minor improvement in edema.     -currently stable  - monitor UOP  - nephrology consulted, likely 2/2 diuretics/intravascular fluid shift vs vancomycin    LAURA is likely due to unclear. Baseline creatinine is 1.6-1.7. Most recent creatinine and eGFR are listed below.  Recent Labs     06/09/25  0735 06/10/25  0601 06/11/25  0542   CREATININE 1.7* 1.7* 1.8*   EGFRNORACEVR 53* 53* 50*      Plan  - LAURA is stable  - Avoid nephrotoxins and renally dose meds for GFR listed above  - Monitor urine output, serial BMP, and adjust therapy as needed

## 2025-06-11 NOTE — ASSESSMENT & PLAN NOTE
Patient's most recent potassium results are listed below.   Recent Labs     06/09/25  0735 06/10/25  0601 06/11/25  0542   K 3.4* 2.9* 3.0*     Plan  - Replete potassium per protocol  - Monitor potassium Daily  - Patient's hypokalemia is stable

## 2025-06-11 NOTE — ASSESSMENT & PLAN NOTE
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 Follows with Dr. Morgan. Hx of anastomotic stricture requiring dilatation and stenting. Had been having uptrending bilirubin and MRCP ordered outpatient, done at Osh as outpatient. Admitted to Osh with Tbili of 8, ascites, transudative pleural effusion.   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  - PETH negative  Plan:  - hepatology consulted > s/p TJ liver biopsy with portal pressures on 06/09, follow up results   - not a retransplant candidate  - f/u MRCP results from OSH   - continue sirolimus 1mg BID  - valaclovir PPx continued

## 2025-06-11 NOTE — ASSESSMENT & PLAN NOTE
Patient found to have large pleural effusion on imaging. I have personally reviewed and interpreted the following imaging: Xray. A thoracentesis was performed. Pleural fluid was sent for analysis. Labs reviewed, including see abve. Fluid previously most consistent with Transudate. Studies from 6/9 were exudative by Lights criteria which may have been skewed by diuretics. Most likely etiology includes Cirrhosis. Management to include Diuresis    -Presented with new onset SOB at OSH. Found to have Large right pleural effusion causing compressive atelectasis of most of the right lung on 6/2 CXR. Underwent thora  -Right US guided thoracentesis on 6/3 with removal of 1000 cc of reddish-brown otherwise nonturbid fluid. Thora fluid studies: Gluc 91, , pH 8, Prot 3.5,  with 15% PMN, RBC 20,6000.   -Previous studies consistent with transudative effusion. Likely from ascites  -CT chest with enlarged effusion compared to prior studies at this hospital   -6/9: Exudative by Light's criteria due to protein content. Possibly confounded due to diuretic use  -per pulmonology, may be non chylous pleural effusion 2/2 sirolimus given effusion coinciding with pt restarting the med in April  Plan:  - pulmonology initially deferring thoracentesis due to stable respiratory status.  Eventually performed on 06/09, 1.5 L removed  - f/u thora labs, cytology  - follow up cytology reported sent at OSH

## 2025-06-11 NOTE — ASSESSMENT & PLAN NOTE
This patient does have evidence of infective focus  My overall impression is sepsis without organ dysfunction.  Source: Intra-abdominal Infection  Given lack of symptom, resolution of fevers, negative workup, discontinue antibiotics

## 2025-06-11 NOTE — PROGRESS NOTES
Vancomycin discontinued. Pharmacy to sign off at this time.    Please call or re-consult if need.    Antony Rosen, Jamar  Spectra [phone]

## 2025-06-11 NOTE — PHYSICIAN QUERY
Provider, due to conflicting documentation please clarify the stage of CKD.   Chronic kidney disease (CKD) stage 3a - (eGFR 45-59)

## 2025-06-12 ENCOUNTER — TELEPHONE (OUTPATIENT)
Dept: TRANSPLANT | Facility: CLINIC | Age: 35
End: 2025-06-12

## 2025-06-12 LAB
ABSOLUTE EOSINOPHIL (OHS): 0.3 K/UL
ABSOLUTE MONOCYTE (OHS): 0.43 K/UL (ref 0.3–1)
ABSOLUTE NEUTROPHIL COUNT (OHS): 1.94 K/UL (ref 1.8–7.7)
ALBUMIN SERPL BCP-MCNC: 1.7 G/DL (ref 3.5–5.2)
ALP SERPL-CCNC: 130 UNIT/L (ref 40–150)
ALT SERPL W/O P-5'-P-CCNC: 20 UNIT/L (ref 10–44)
ANION GAP (OHS): 9 MMOL/L (ref 8–16)
AST SERPL-CCNC: 53 UNIT/L (ref 11–45)
BASOPHILS # BLD AUTO: 0.03 K/UL
BASOPHILS NFR BLD AUTO: 0.7 %
BILIRUB DIRECT SERPL-MCNC: 4.2 MG/DL (ref 0.1–0.3)
BILIRUB SERPL-MCNC: 5.4 MG/DL (ref 0.1–1)
BUN SERPL-MCNC: 23 MG/DL (ref 6–20)
CALCIUM SERPL-MCNC: 7.5 MG/DL (ref 8.7–10.5)
CHLORIDE SERPL-SCNC: 97 MMOL/L (ref 95–110)
CO2 SERPL-SCNC: 26 MMOL/L (ref 23–29)
CREAT SERPL-MCNC: 1.6 MG/DL (ref 0.5–1.4)
DHEA SERPL-MCNC: NORMAL
ERYTHROCYTE [DISTWIDTH] IN BLOOD BY AUTOMATED COUNT: 13.7 % (ref 11.5–14.5)
ESTROGEN SERPL-MCNC: NORMAL PG/ML
GFR SERPLBLD CREATININE-BSD FMLA CKD-EPI: 57 ML/MIN/1.73/M2
GLUCOSE SERPL-MCNC: 113 MG/DL (ref 70–110)
HCT VFR BLD AUTO: 24.2 % (ref 40–54)
HGB BLD-MCNC: 8 GM/DL (ref 14–18)
IMM GRANULOCYTES # BLD AUTO: 0.02 K/UL (ref 0–0.04)
IMM GRANULOCYTES NFR BLD AUTO: 0.5 % (ref 0–0.5)
INR PPP: 1.2 (ref 0.8–1.2)
INSULIN SERPL-ACNC: NORMAL U[IU]/ML
LAB AP CLINICAL INFORMATION: NORMAL
LAB AP GROSS DESCRIPTION: NORMAL
LAB AP PERFORMING LOCATION(S): NORMAL
LAB AP REPORT FOOTNOTES: NORMAL
LYMPHOCYTES # BLD AUTO: 1.35 K/UL (ref 1–4.8)
MAGNESIUM SERPL-MCNC: 1.9 MG/DL (ref 1.6–2.6)
MCH RBC QN AUTO: 32.8 PG (ref 27–31)
MCHC RBC AUTO-ENTMCNC: 33.1 G/DL (ref 32–36)
MCV RBC AUTO: 99 FL (ref 82–98)
NUCLEATED RBC (/100WBC) (OHS): 0 /100 WBC
PHOSPHATE SERPL-MCNC: 2.3 MG/DL (ref 2.7–4.5)
PLATELET # BLD AUTO: 79 K/UL (ref 150–450)
PMV BLD AUTO: 9.6 FL (ref 9.2–12.9)
POTASSIUM SERPL-SCNC: 3.2 MMOL/L (ref 3.5–5.1)
PROT SERPL-MCNC: 5.4 GM/DL (ref 6–8.4)
PROTHROMBIN TIME: 13.3 SECONDS (ref 9–12.5)
RBC # BLD AUTO: 2.44 M/UL (ref 4.6–6.2)
RELATIVE EOSINOPHIL (OHS): 7.4 %
RELATIVE LYMPHOCYTE (OHS): 33.2 % (ref 18–48)
RELATIVE MONOCYTE (OHS): 10.6 % (ref 4–15)
RELATIVE NEUTROPHIL (OHS): 47.6 % (ref 38–73)
SIROLIMUS BLD-MCNC: 6.9 NG/ML (ref 4–20)
SODIUM SERPL-SCNC: 132 MMOL/L (ref 136–145)
T3RU NFR SERPL: NORMAL %
WBC # BLD AUTO: 4.07 K/UL (ref 3.9–12.7)

## 2025-06-12 PROCEDURE — 83735 ASSAY OF MAGNESIUM: CPT

## 2025-06-12 PROCEDURE — 85025 COMPLETE CBC W/AUTO DIFF WBC: CPT

## 2025-06-12 PROCEDURE — 99900035 HC TECH TIME PER 15 MIN (STAT)

## 2025-06-12 PROCEDURE — 25000003 PHARM REV CODE 250

## 2025-06-12 PROCEDURE — 80053 COMPREHEN METABOLIC PANEL: CPT

## 2025-06-12 PROCEDURE — 99232 SBSQ HOSP IP/OBS MODERATE 35: CPT | Mod: GC,NTX,, | Performed by: INTERNAL MEDICINE

## 2025-06-12 PROCEDURE — 84100 ASSAY OF PHOSPHORUS: CPT

## 2025-06-12 PROCEDURE — 85610 PROTHROMBIN TIME: CPT

## 2025-06-12 PROCEDURE — 63600175 PHARM REV CODE 636 W HCPCS

## 2025-06-12 PROCEDURE — 82248 BILIRUBIN DIRECT: CPT | Performed by: INTERNAL MEDICINE

## 2025-06-12 PROCEDURE — 25000003 PHARM REV CODE 250: Mod: NTX | Performed by: STUDENT IN AN ORGANIZED HEALTH CARE EDUCATION/TRAINING PROGRAM

## 2025-06-12 PROCEDURE — 36415 COLL VENOUS BLD VENIPUNCTURE: CPT | Performed by: INTERNAL MEDICINE

## 2025-06-12 PROCEDURE — 20600001 HC STEP DOWN PRIVATE ROOM: Mod: NTX

## 2025-06-12 PROCEDURE — 80195 ASSAY OF SIROLIMUS: CPT | Performed by: STUDENT IN AN ORGANIZED HEALTH CARE EDUCATION/TRAINING PROGRAM

## 2025-06-12 RX ADMIN — OXYCODONE HYDROCHLORIDE 30 MG: 10 TABLET ORAL at 07:06

## 2025-06-12 RX ADMIN — DIAZEPAM 5 MG: 5 TABLET ORAL at 08:06

## 2025-06-12 RX ADMIN — SENNOSIDES AND DOCUSATE SODIUM 1 TABLET: 50; 8.6 TABLET ORAL at 09:06

## 2025-06-12 RX ADMIN — OXYCODONE HYDROCHLORIDE 30 MG: 10 TABLET ORAL at 12:06

## 2025-06-12 RX ADMIN — OXYCODONE HYDROCHLORIDE 30 MG: 10 TABLET ORAL at 01:06

## 2025-06-12 RX ADMIN — SIROLIMUS 1 MG: 1 TABLET ORAL at 09:06

## 2025-06-12 NOTE — ASSESSMENT & PLAN NOTE
Hyponatremia is likely due to Cirrhosis. The patient's most recent sodium results are listed below.  Recent Labs     06/10/25  0601 06/11/25  0542 06/12/25  0732   * 132* 132*     Plan  - Monitor sodium Daily.   - Patient hyponatremia is stable

## 2025-06-12 NOTE — PROGRESS NOTES
[facility] Progress Note      Attending: Heladio Corbin MD   Admit Date: 6/6/2025  Today's Date: 06/12/2025    SUBJECTIVE:     Interval History:   No complaints this morning.    Scheduled Medications:    diazePAM  5 mg Oral QHS    sirolimus  1 mg Oral Daily       PRN Medications:     Current Facility-Administered Medications:     acetaminophen, 650 mg, Oral, Q8H PRN    albuterol-ipratropium, 3 mL, Nebulization, Q4H PRN    aluminum-magnesium hydroxide-simethicone, 30 mL, Oral, QID PRN    bisacodyL, 10 mg, Rectal, Daily PRN    dextrose 50%, 12.5 g, Intravenous, PRN    dextrose 50%, 25 g, Intravenous, PRN    glucagon (human recombinant), 1 mg, Intramuscular, PRN    glucose, 16 g, Oral, PRN    glucose, 24 g, Oral, PRN    melatonin, 6 mg, Oral, Nightly PRN    naloxone, 0.02 mg, Intravenous, PRN    ondansetron, 4 mg, Intravenous, Q8H PRN    oxyCODONE, 30 mg, Oral, Q6H PRN    prochlorperazine, 5 mg, Intravenous, Q6H PRN    senna-docusate, 1 tablet, Oral, BID PRN    OBJECTIVE:     Vital Signs Trends/Hx Reviewed  Vitals:    06/12/25 0659 06/12/25 0721 06/12/25 0807 06/12/25 1150   BP:   (!) 115/54 129/63   BP Location:       Patient Position:   Lying    Pulse: 89  86 87   Resp:  20 18 18   Temp:   99.5 °F (37.5 °C) 98.6 °F (37 °C)   TempSrc:   Oral Oral   SpO2:   95% 95%   Weight:       Height:             Physical Exam  Vitals reviewed.   Constitutional:       General: He is not in acute distress.     Appearance: He is not diaphoretic.   Eyes:      General: Scleral icterus present.   Cardiovascular:      Rate and Rhythm: Normal rate.   Pulmonary:      Effort: Pulmonary effort is normal.   Abdominal:      General: There is no distension.      Tenderness: There is no abdominal tenderness.   Neurological:      Mental Status: He is alert.          Laboratory:  Lab results in last 24 hours reviewed.     MELD 3.0: 24 at 6/12/2025  7:32 AM  MELD-Na: 22 at 6/12/2025  7:32 AM  Calculated from:  Serum  Creatinine: 1.6 mg/dL at 6/12/2025  7:32 AM  Serum Sodium: 132 mmol/L at 6/12/2025  7:32 AM  Total Bilirubin: 5.4 mg/dL at 6/12/2025  7:32 AM  Serum Albumin: 1.7 g/dL at 6/12/2025  7:32 AM  INR(ratio): 1.2 at 6/12/2025  7:32 AM  Age at listing (hypothetical): 34 years  Sex: Male at 6/12/2025  7:32 AM      ASSESSMENT & RECOMMENDATIONS   34M with alcoholic hepatitis s/p liver transplant 10/19/2017 on Sirolimus 1 mg daily with post transplant course complicated by alcohol relapse 2019 (most recent PETH 9/2024 <10), anastomotic stricture requiring stenting, LAURA previously on HD followed by Dr. Morgan, umbilical hernia repair 11/2022, open L inguinal hernia repair 1/2025 and R inguinal hernia repair 4/2025, persistently elevated LFTs since most recent hernia repair who initially presented to OSH with SOB and jaundice, found to have a large R pleural effusion s/p thoracentesis, elevated liver enzymes and LAURA, ultimately sent to [facility] on 6/06 for higher level of care. Hepatology consulted for IS management and hyperbilirubinemia. We recommended liver bx which was completed on 6/8, HVPG 11 concerning for portal hypertension. He spiked a fever on 6/8 to 101F, started on broad spectrum abx. He underwent repeat thoracentesis with Pulm on 6/9, 1.5 L removed, exudative based on light's criteria. Pulmonology thinks the effusion could be related to use of Sirolimus as there has been some data with development of effusions. PETH <10. He is s/p TJ liver bx, awaiting formal path.     Problem List:  Alcoholic hepatitis s/p liver transplant on IS  Elevated liver enzymes  Stage 2 fibrosis on liver bx 2024, now with imaging concerning for cirrhosis  Alcohol use disorder (in remission)  LAURA  Pleural effusion s/p thoracentesisx2  LE edema     Recommendations:  -S/p liver bx on 6/8, awaiting formal pathology read. Bx reviewed in conference-concerning for cirrhosis but unclear how this would have developed. Discussed opening a transplant  evaluation, which he is agreeable to.  -Pt states he already had an MRCP previously which did not show biliary obstruction. Will attempt to request those records.   -Agree with infectious workup and broad spectrum abx.   -Continue Sirolimus 1mg daily with daily AM sirolimus levels.  -CMP, INR daily.       Thank you for allowing us to participate in the care of this patient. Please call with questions.        Roya Lizarraga MD  Gastroenterology Fellow, PGY-V  [facility]

## 2025-06-12 NOTE — ASSESSMENT & PLAN NOTE
Patient's most recent potassium results are listed below.   Recent Labs     06/10/25  0601 06/11/25  0542 06/12/25  0732   K 2.9* 3.0* 3.2*     Plan  - Replete potassium per protocol  - Monitor potassium Daily  - Patient's hypokalemia is stable

## 2025-06-12 NOTE — ASSESSMENT & PLAN NOTE
Creatine stable for now. BMP reviewed- noted Estimated Creatinine Clearance: 58.2 mL/min (A) (based on SCr of 1.6 mg/dL (H)). according to latest data. Based on current GFR, CKD stage is stage 2 - GFR 60-89.  Monitor UOP and serial BMP and adjust therapy as needed. Renally dose meds. Avoid nephrotoxic medications and procedures.

## 2025-06-12 NOTE — PLAN OF CARE
AST, ALT, TB and DB stable. Cr=1.6 dec from 1.8. K+=3.2 inc from 3.0 after replacements. Phos=2.3. Mg+2=1.9. Has decreased appetite. UOP adequate. Pain decreased with Oxycodone. Ambulating in room wearing slippers. Afebrile. Pt informed by hepatology need for LT eval. Pt crying after. Intense emotional support given.

## 2025-06-12 NOTE — PLAN OF CARE
AAOx4,VSS,afebrile   Liver bx pending   See documentation and assessment for further interventions   Problem: Adult Inpatient Plan of Care  Goal: Plan of Care Review  Outcome: Progressing  Goal: Patient-Specific Goal (Individualized)  Outcome: Progressing  Goal: Absence of Hospital-Acquired Illness or Injury  Outcome: Progressing  Goal: Optimal Comfort and Wellbeing  Outcome: Progressing  Goal: Readiness for Transition of Care  Outcome: Progressing     Problem: Acute Kidney Injury/Impairment  Goal: Fluid and Electrolyte Balance  Outcome: Progressing  Goal: Improved Oral Intake  Outcome: Progressing  Goal: Effective Renal Function  Outcome: Progressing     Problem: Fall Injury Risk  Goal: Absence of Fall and Fall-Related Injury  Outcome: Progressing     Problem: Liver Failure  Goal: Optimal Coping with Liver Failure  Outcome: Progressing  Goal: Fluid and Electrolyte Balance  Outcome: Progressing  Goal: Optimal Gastrointestinal Function  Outcome: Progressing  Goal: Blood Glucose Level Within Target Range  Outcome: Progressing  Goal: Optimal Coagulation Function  Outcome: Progressing  Goal: Absence of Infection Signs and Symptoms  Outcome: Progressing  Goal: Optimal Neurologic Function  Outcome: Progressing  Goal: Improved Oral Intake  Outcome: Progressing  Goal: Optimal Pain Control, Comfort and Function  Outcome: Progressing  Goal: Optimize Renal Function  Outcome: Progressing  Goal: Effective Oxygenation and Ventilation  Outcome: Progressing

## 2025-06-12 NOTE — ASSESSMENT & PLAN NOTE
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 Follows with Dr. Morgan. Hx of anastomotic stricture requiring dilatation and stenting. Had been having uptrending bilirubin and MRCP ordered outpatient, done at [facility] as outpatient. Admitted to [facility] with Tbili of 8, ascites, transudative pleural effusion.   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  Liver biopsy consistent with cirrhosis.  Patient undergo redo-transplant evaluation  - f/u MRCP results from OSH   - continue sirolimus 1mg BID  - valaclovir PPx continued

## 2025-06-12 NOTE — ASSESSMENT & PLAN NOTE
Mostly resolved at this point.    Hx of LAURA on CKD at OSH with cr rising to 2.5. Most recently before transfer was 1.69. baseline ~ 1.6-1.7. Started on Lasix 80 IV daily, spironolactone 100mg PO daily  at OSH with minor improvement in edema.     -now improving  - monitor UOP  - nephrology consulted, likely 2/2 diuretics/intravascular fluid shift vs vancomycin    LAURA is likely due to unclear. Baseline creatinine is 1.6-1.7. Most recent creatinine and eGFR are listed below.  Recent Labs     06/10/25  0601 06/11/25  0542 06/12/25  0732   CREATININE 1.7* 1.8* 1.6*   EGFRNORACEVR 53* 50* 57*      Plan  - LAURA is stable  - Avoid nephrotoxins and renally dose meds for GFR listed above  - Monitor urine output, serial BMP, and adjust therapy as needed

## 2025-06-12 NOTE — CARE UPDATE
[facility]  [facility]rology        Patient Name: Jhonny Diana   MRN: [id number]   Current Provider: Heladio Corbin MD  Primary Care Provider: Elo Bull MD   Admission Date: 6/6/2025   Hospital Day: 6  Bed: 07879/84132 A  Principal Problem: History of liver transplant            Nephrology Chart Review      Intake/Output Summary (Last 24 hours) at 6/12/2025 1547  Last data filed at 6/12/2025 0233  Gross per 24 hour   Intake --   Output 825 ml   Net -825 ml       Vitals:    06/12/25 0721 06/12/25 0807 06/12/25 1150 06/12/25 1334   BP:  (!) 115/54 129/63    BP Location:       Patient Position:  Lying Lying    Pulse:  86 87    Resp: 20 18 18 16   Temp:  99.5 °F (37.5 °C) 98.6 °F (37 °C)    TempSrc:  Oral Oral    SpO2:  95% 95%    Weight:       Height:           Recent Labs   Lab 06/10/25  0601 06/11/25  0542 06/12/25  0732   * 132* 132*   K 2.9* 3.0* 3.2*   CL 96 96 97   CO2 28 27 26   BUN 30* 27* 23*   CREATININE 1.7* 1.8* 1.6*   CALCIUM 7.5* 7.5* 7.5*   PHOS 2.4* 2.3* 2.3*       - Will continue to monitor  - Edema improving    No other related issues identified. Please call Nephrology as needed; We will continue to follow.      Erasmo Krueger MD.  Clinical Nephrology Fellow, PGY-4  [facility] [address]

## 2025-06-12 NOTE — ASSESSMENT & PLAN NOTE
Patient found to have large pleural effusion on imaging.  A thoracentesis was performed. Pleural fluid was sent for analysis.Fluid previously most consistent with Transudate. Studies from 6/9 were exudative by Lights criteria which may have been skewed by diuretics. Most likely etiology includes Cirrhosis. Management to include Diuresis    -Presented with new onset SOB at OSH. Found to have Large right pleural effusion causing compressive atelectasis of most of the right lung on 6/2 CXR. Underwent thora  -Right US guided thoracentesis on 6/3 with removal of 1000 cc of reddish-brown otherwise nonturbid fluid. Thora fluid studies: Gluc 91, , pH 8, Prot 3.5,  with 15% PMN, RBC 20,6000.   -Previous studies consistent with transudative effusion. Likely from ascites  -CT chest with enlarged effusion compared to prior studies at this hospital   -6/9: Exudative by Light's criteria due to protein content. Possibly confounded due to diuretic use  -per pulmonology, may be non chylous pleural effusion 2/2 sirolimus given effusion coinciding with pt restarting the med in April  Plan:  - pulmonology initially deferring thoracentesis due to stable respiratory status.  Eventually performed on 06/09, 1.5 L removed  - f/u thora labs, cytology  - follow up cytology reported sent at OSH

## 2025-06-12 NOTE — ASSESSMENT & PLAN NOTE
This patient does have evidence of infective focus  My overall impression is sepsis without organ dysfunction.  Source: Intra-abdominal Infection  Given lack of symptom, resolution of fevers, negative workup, discontinued antibiotics.  Will continue to monitor.

## 2025-06-12 NOTE — TELEPHONE ENCOUNTER
Good afternoon!    Dr. Palacios would like open to an inpatient evaluation on this patient with a MELD of 25 and a diagnosis of  decompensated cirrhosis of transplanted liver, ICD 10 codes K74.61 and T86.42  Thank you,  Mi

## 2025-06-12 NOTE — PROGRESS NOTES
[facility]  Progress Note    Patient Name: Jhonny Diana  MRN: [id number]  Patient Class: IP- Inpatient   Admission Date: 6/6/2025  Length of Stay: 6 days  Attending Physician: Heladio Corbin MD  Primary Care Provider: Elo Bull MD        Subjective     Principal Problem:History of liver transplant        HPI:   Per  note:    Mr. 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 His post-transplant period was notable for LAURA previously on HD, anastomotic stricture requiring dilatation and stenting, and ETOH relapse (6/2019). He has followed with Dr. Julio in Hepatology, though his last clinic visit was 5/30/23. He is now followed by Dr. Morgan in Transplant Clinic. Per Dr. Michel at [facility], he is only on Sirolimus 1mg daily (of note, this was held a short time after his hernia repair).    He was recently admitted to [facility] in [place] on 6/2 with SOB, jaundice, and anasarca. Since admit, he has been taking his home Sirolimis.    He was ultimately found to have a large right pleural effusion. He had a right US guided thoracentesis on 6/3 with removal of 1000 cc of reddish-brown otherwise nonturbid fluid. Thora fluid studies: Gluc 91, , pH 8, Prot 3.5,  with 15% PMN, RBC 20,6000. He also had ascites, Abdominal U/S: Only trace fluid right upper quadrant insufficient for ultrasound-guided drainage.     He was also found to be in LAURA with Cr of 1.98, which then went to 2.02, and has now improved to 1.8 again with IV diuresis (Lasix + Metolazone). Nephrology there was consulted. At admit: WBC 7.1, Hg 10.6, [id number], AST 46, ALT 28, TB 8.2, NH3 57, , INR 1.3. TB today has improved to 5.6.    Yesterday, unexpectedly his  blood cultures 2/2 turned positive for GPC. He was started on Vancomycin and Rocephin 2g iv daily. They do not have ID service there and have not done TTE or BRADEN. Given elevated TB and bacteremia, GI there recommended transfer to center with Hepatology to help manage him with history of XPL.    Current VS 6/4 at 4pm: T 98.0F, P 80, RR 80, /55.    CXR 6/2: Large right pleural effusion causing compressive atelectasis of most of the right lung. Left lung is clear.The trachea appears severely narrowed and the right mainstem bronchus is not well seen, raising the possibility of a mediastinal mass.     Overview/Hospital Course:  Mr. Diana is a 34-year-old gentleman with history of liver transplant in 2017 following alcoholic hepatitis.  Transplant complicated by anastomotic stricture that required stent as well as treatment of acute rejection in 2024 requiring steroids.  He is admitted with decompensated cirrhosis involving volume overload.  Patient had large right-sided pleural effusion here of uncertain etiology.  Treated with thoracentesis on 06/03 with clinical improvement.  Patient treated for sepsis with vancomycin and Zosyn, but antibiotics discontinued on 06/11 following negative cultures.  6/8 liver biopsy pathology shows significant fibrosis without active steatohepatitis.  Hepatology uncertain how cirrhosis re-developed.  Patient agreeable to getting read-listed for transplant.    Interval History:  Fever resolving.  Patient understandably tearful given news of recent biopsy.    Review of Systems  Objective:     Vital Signs (Most Recent):  Temp: 98.1 °F (36.7 °C) (06/10/25 1120)  Pulse: 80 (06/10/25 1120)  Resp: 19 (06/10/25 1120)  BP: (!) 103/49 (06/10/25 1120)  SpO2: 96 % (06/10/25 1120) Vital Signs (24h Range):  Temp:  [98.1 °F (36.7 °C)-101.1 °F (38.4 °C)] 98.1 °F (36.7 °C)  Pulse:  [] 80  Resp:  [17-19] 19  SpO2:  [92 %-96 %] 96 %  BP: (103-129)/(49-67) 103/49     Weight: 66.8 kg (147 lb 4.3  oz)  Body mass index is 23.77 kg/m².    Intake/Output Summary (Last 24 hours) at 6/10/2025 1231  Last data filed at 6/10/2025 0511  Gross per 24 hour   Intake 200 ml   Output 1800 ml   Net -1600 ml      Physical Exam  Vitals and nursing note reviewed.   Constitutional:       General: He is not in acute distress.     Appearance: He is ill-appearing.   HENT:      Head: Normocephalic.   Eyes:      General: Scleral icterus present.   Cardiovascular:      Rate and Rhythm: Regular rhythm. Tachycardia present.      Heart sounds: Normal heart sounds. No murmur heard.  Pulmonary:      Breath sounds: No decreased breath sounds, wheezing, rhonchi or rales.   Abdominal:      General: There is no distension.      Palpations: Abdomen is soft.      Tenderness: There is no abdominal tenderness.   Musculoskeletal:      Right lower leg: Edema present.      Left lower leg: Edema present.   Skin:     Coloration: Skin is jaundiced.   Neurological:      General: No focal deficit present.      Mental Status: He is alert and oriented to person, place, and time.   Psychiatric:         Mood and Affect: Mood normal.         Behavior: Behavior normal.         MELD 3.0: 26 at 6/10/2025  6:01 AM  MELD-Na: 24 at 6/10/2025  6:01 AM  Calculated from:  Serum Creatinine: 1.7 mg/dL at 6/10/2025  6:01 AM  Serum Sodium: 132 mmol/L at 6/10/2025  6:01 AM  Total Bilirubin: 5.8 mg/dL at 6/10/2025  6:01 AM  Serum Albumin: 1.8 g/dL at 6/10/2025  6:01 AM  INR(ratio): 1.3 at 6/10/2025  6:01 AM  Age at listing (hypothetical): 34 years  Sex: Male at 6/10/2025  6:01 AM      Significant Labs:  CBC:  Recent Labs   Lab 06/09/25  0027 06/09/25  0718 06/10/25  0601   WBC 6.40 8.14 4.92   HGB 9.4* 9.7* 8.5*   HCT 27.5* 29.5* 25.8*   * 111* 80*     CMP:  Recent Labs   Lab 06/09/25  0027 06/09/25  0735 06/10/25  0601   * 133* 132*   K 3.5 3.4* 2.9*   CL 95 96 96   CO2 28 28 28   * 109 106   BUN 29* 29* 30*   CREATININE 1.6* 1.7* 1.7*   CALCIUM 8.1*  8.1* 7.5*   PROT 6.4 6.4 5.5*   ALBUMIN 2.3* 2.2* 1.8*   BILITOT 6.6* 7.3* 5.8*   ALKPHOS 141 134 120   AST 63* 60* 51*   ALT 26 26 23   ANIONGAP 11 9 8     PTINR:  Recent Labs   Lab 06/09/25  0718 06/10/25  0601   INR 1.3* 1.3*       Significant Procedures:   Dobutamine Stress Test with Color Flow: No results found. However, due to the size of the patient record, not all encounters were searched. Please check Results Review for a complete set of results.    thoracentesis      Assessment & Plan  Pleural effusion    Patient found to have large pleural effusion on imaging.  A thoracentesis was performed. Pleural fluid was sent for analysis.Fluid previously most consistent with Transudate. Studies from 6/9 were exudative by Lights criteria which may have been skewed by diuretics. Most likely etiology includes Cirrhosis. Management to include Diuresis    -Presented with new onset SOB at OSH. Found to have Large right pleural effusion causing compressive atelectasis of most of the right lung on 6/2 CXR. Underwent thora  -Right US guided thoracentesis on 6/3 with removal of 1000 cc of reddish-brown otherwise nonturbid fluid. Thora fluid studies: Gluc 91, , pH 8, Prot 3.5,  with 15% PMN, RBC 20,6000.   -Previous studies consistent with transudative effusion. Likely from ascites  -CT chest with enlarged effusion compared to prior studies at this hospital   -6/9: Exudative by Light's criteria due to protein content. Possibly confounded due to diuretic use  -per pulmonology, may be non chylous pleural effusion 2/2 sirolimus given effusion coinciding with pt restarting the med in April  Plan:  - pulmonology initially deferring thoracentesis due to stable respiratory status.  Eventually performed on 06/09, 1.5 L removed  - f/u thora labs, cytology  - follow up cytology reported sent at OSH       History of liver transplant  Hyperbilirubinemia  Immunosuppression  Liver tx in 2017 2/2 Etoh cirrhosis. Follows with  Dr. Morgan. Hx of anastomotic stricture requiring dilatation and stenting. Had been having uptrending bilirubin and MRCP ordered outpatient, done at [facility] as outpatient. Admitted to [facility] with Tbili of 8, ascites, transudative pleural effusion.   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  Liver biopsy consistent with cirrhosis.  Patient undergo redo-transplant evaluation  - f/u MRCP results from OSH   - continue sirolimus 1mg BID  - valaclovir PPx continued     Ascites  Lower extremity edema  Likely 2/2 decreased oncotic pressure due to hyopalbuminemia, inflammation/infection  Started on Lasix 80 IV daily, metolazone, spironolactone 100mg PO daily  at OSH with minor improvement in edema. NOT given here.   S/p 25mg albumin and lasix 80mg IV evening of 6/7 with some improvement     Sepsis  This patient does have evidence of infective focus  My overall impression is sepsis without organ dysfunction.  Source: Intra-abdominal Infection  Given lack of symptom, resolution of fevers, negative workup, discontinued antibiotics.  Will continue to monitor.  Acute kidney injury superimposed on CKD  Mostly resolved at this point.    Hx of LAURA on CKD at OSH with cr rising to 2.5. Most recently before transfer was 1.69. baseline ~ 1.6-1.7. Started on Lasix 80 IV daily, spironolactone 100mg PO daily  at OSH with minor improvement in edema.     -now improving  - monitor UOP  - nephrology consulted, likely 2/2 diuretics/intravascular fluid shift vs vancomycin    LAURA is likely due to unclear. Baseline creatinine is 1.6-1.7. Most recent creatinine and eGFR are listed below.  Recent Labs     06/10/25  0601 06/11/25  0542 06/12/25  0732   CREATININE 1.7* 1.8* 1.6*   EGFRNORACEVR 53* 50* 57*      Plan  - LAURA is stable  - Avoid nephrotoxins and renally dose meds for GFR listed above  - Monitor urine output,  serial BMP, and adjust therapy as needed  Opioid dependence  Chronic pain  Follows with pain hospitalist. Takes 30mg oxycodone QID   - continue oxycodone 30mg q6h PRN to avoid withdrawals while inpatient     Hypokalemia  Patient's most recent potassium results are listed below.   Recent Labs     06/10/25  0601 06/11/25  0542 06/12/25  0732   K 2.9* 3.0* 3.2*     Plan  - Replete potassium per protocol  - Monitor potassium Daily  - Patient's hypokalemia is stable  Hyponatremia  Hyponatremia is likely due to Cirrhosis. The patient's most recent sodium results are listed below.  Recent Labs     06/10/25  0601 06/11/25  0542 06/12/25  0732   * 132* 132*     Plan  - Monitor sodium Daily.   - Patient hyponatremia is stable  CKD stage 3a, GFR 45-59 ml/min  Creatine stable for now. BMP reviewed- noted Estimated Creatinine Clearance: 58.2 mL/min (A) (based on SCr of 1.6 mg/dL (H)). according to latest data. Based on current GFR, CKD stage is stage 2 - GFR 60-89.  Monitor UOP and serial BMP and adjust therapy as needed. Renally dose meds. Avoid nephrotoxic medications and procedures.  VTE Risk Mitigation (From admission, onward)           Ordered     IP VTE LOW RISK PATIENT  Once         06/06/25 1604     Place sequential compression device  Until discontinued         06/06/25 1604                    Discharge Planning   DERICK: 6/15/2025     Code Status: Full Code   Medical Readiness for Discharge Date:   Discharge Plan A: Home with family                        Heladio Corbin MD  Department of Hospital Medicine   [facility]

## 2025-06-13 ENCOUNTER — TELEPHONE (OUTPATIENT)
Dept: TRANSPLANT | Facility: HOSPITAL | Age: 35
End: 2025-06-13
Payer: MEDICARE

## 2025-06-13 LAB
ABSOLUTE EOSINOPHIL (OHS): 0.4 K/UL
ABSOLUTE MONOCYTE (OHS): 0.75 K/UL (ref 0.3–1)
ABSOLUTE NEUTROPHIL COUNT (OHS): 3 K/UL (ref 1.8–7.7)
ALBUMIN SERPL BCP-MCNC: 2.1 G/DL (ref 3.5–5.2)
ALP SERPL-CCNC: 145 UNIT/L (ref 40–150)
ALT SERPL W/O P-5'-P-CCNC: 26 UNIT/L (ref 10–44)
ANION GAP (OHS): 12 MMOL/L (ref 8–16)
AST SERPL-CCNC: 64 UNIT/L (ref 11–45)
BASOPHILS # BLD AUTO: 0.05 K/UL
BASOPHILS NFR BLD AUTO: 0.8 %
BILIRUB DIRECT SERPL-MCNC: 5.4 MG/DL (ref 0.1–0.3)
BILIRUB SERPL-MCNC: 7.4 MG/DL (ref 0.1–1)
BUN SERPL-MCNC: 23 MG/DL (ref 6–20)
CALCIUM SERPL-MCNC: 8.2 MG/DL (ref 8.7–10.5)
CHLORIDE SERPL-SCNC: 97 MMOL/L (ref 95–110)
CO2 SERPL-SCNC: 26 MMOL/L (ref 23–29)
CREAT SERPL-MCNC: 1.5 MG/DL (ref 0.5–1.4)
ERYTHROCYTE [DISTWIDTH] IN BLOOD BY AUTOMATED COUNT: 13.9 % (ref 11.5–14.5)
GFR SERPLBLD CREATININE-BSD FMLA CKD-EPI: >60 ML/MIN/1.73/M2
GLUCOSE SERPL-MCNC: 83 MG/DL (ref 70–110)
HCT VFR BLD AUTO: 29.6 % (ref 40–54)
HGB BLD-MCNC: 9.9 GM/DL (ref 14–18)
IMM GRANULOCYTES # BLD AUTO: 0.03 K/UL (ref 0–0.04)
IMM GRANULOCYTES NFR BLD AUTO: 0.5 % (ref 0–0.5)
INR PPP: 1.1 (ref 0.8–1.2)
LYMPHOCYTES # BLD AUTO: 2.33 K/UL (ref 1–4.8)
MAGNESIUM SERPL-MCNC: 2 MG/DL (ref 1.6–2.6)
MCH RBC QN AUTO: 32.8 PG (ref 27–31)
MCHC RBC AUTO-ENTMCNC: 33.4 G/DL (ref 32–36)
MCV RBC AUTO: 98 FL (ref 82–98)
NUCLEATED RBC (/100WBC) (OHS): 0 /100 WBC
PHOSPHATE SERPL-MCNC: 2.8 MG/DL (ref 2.7–4.5)
PLATELET # BLD AUTO: 120 K/UL (ref 150–450)
PLATELET BLD QL SMEAR: ABNORMAL
PMV BLD AUTO: 9.5 FL (ref 9.2–12.9)
POTASSIUM SERPL-SCNC: 3.2 MMOL/L (ref 3.5–5.1)
PROT SERPL-MCNC: 6.6 GM/DL (ref 6–8.4)
PROTHROMBIN TIME: 12.4 SECONDS (ref 9–12.5)
RBC # BLD AUTO: 3.02 M/UL (ref 4.6–6.2)
RELATIVE EOSINOPHIL (OHS): 6.1 %
RELATIVE LYMPHOCYTE (OHS): 35.5 % (ref 18–48)
RELATIVE MONOCYTE (OHS): 11.4 % (ref 4–15)
RELATIVE NEUTROPHIL (OHS): 45.7 % (ref 38–73)
SIROLIMUS BLD-MCNC: 8.3 NG/ML (ref 4–20)
SODIUM SERPL-SCNC: 135 MMOL/L (ref 136–145)
WBC # BLD AUTO: 6.56 K/UL (ref 3.9–12.7)

## 2025-06-13 PROCEDURE — 36415 COLL VENOUS BLD VENIPUNCTURE: CPT | Mod: NTX | Performed by: INTERNAL MEDICINE

## 2025-06-13 PROCEDURE — 25000003 PHARM REV CODE 250: Mod: NTX

## 2025-06-13 PROCEDURE — 25000003 PHARM REV CODE 250: Mod: NTX | Performed by: STUDENT IN AN ORGANIZED HEALTH CARE EDUCATION/TRAINING PROGRAM

## 2025-06-13 PROCEDURE — 20600001 HC STEP DOWN PRIVATE ROOM: Mod: NTX

## 2025-06-13 PROCEDURE — 82248 BILIRUBIN DIRECT: CPT | Mod: NTX | Performed by: INTERNAL MEDICINE

## 2025-06-13 PROCEDURE — 63600175 PHARM REV CODE 636 W HCPCS: Mod: NTX

## 2025-06-13 PROCEDURE — 84100 ASSAY OF PHOSPHORUS: CPT | Mod: NTX

## 2025-06-13 PROCEDURE — 85610 PROTHROMBIN TIME: CPT | Mod: NTX

## 2025-06-13 PROCEDURE — 83735 ASSAY OF MAGNESIUM: CPT | Mod: NTX

## 2025-06-13 PROCEDURE — 80195 ASSAY OF SIROLIMUS: CPT | Mod: NTX | Performed by: STUDENT IN AN ORGANIZED HEALTH CARE EDUCATION/TRAINING PROGRAM

## 2025-06-13 PROCEDURE — 80053 COMPREHEN METABOLIC PANEL: CPT | Mod: NTX

## 2025-06-13 PROCEDURE — 85025 COMPLETE CBC W/AUTO DIFF WBC: CPT | Mod: NTX

## 2025-06-13 RX ORDER — POTASSIUM CHLORIDE 20 MEQ/1
40 TABLET, EXTENDED RELEASE ORAL ONCE
Status: COMPLETED | OUTPATIENT
Start: 2025-06-13 | End: 2025-06-13

## 2025-06-13 RX ADMIN — POTASSIUM CHLORIDE 40 MEQ: 1500 TABLET, EXTENDED RELEASE ORAL at 02:06

## 2025-06-13 RX ADMIN — POTASSIUM BICARBONATE 50 MEQ: 977.5 TABLET, EFFERVESCENT ORAL at 09:06

## 2025-06-13 RX ADMIN — OXYCODONE HYDROCHLORIDE 30 MG: 10 TABLET ORAL at 01:06

## 2025-06-13 RX ADMIN — SIROLIMUS 1 MG: 1 TABLET ORAL at 09:06

## 2025-06-13 RX ADMIN — OXYCODONE HYDROCHLORIDE 30 MG: 10 TABLET ORAL at 07:06

## 2025-06-13 RX ADMIN — DIAZEPAM 5 MG: 5 TABLET ORAL at 09:06

## 2025-06-13 NOTE — PLAN OF CARE
-AAOx4, VSS, afebrile, on room air  -PRN pain meds given  -Xray completed   -Pt stated he had a small BM in AM  -No complaints at this time   -No falls reported this shift  -Bed low and locked with call light in reach      Problem: Adult Inpatient Plan of Care  Goal: Plan of Care Review  Outcome: Progressing  Goal: Optimal Comfort and Wellbeing  Outcome: Progressing  Goal: Readiness for Transition of Care  Outcome: Progressing     Problem: Fall Injury Risk  Goal: Absence of Fall and Fall-Related Injury  Outcome: Progressing     Problem: Liver Failure  Goal: Optimal Coping with Liver Failure  Outcome: Progressing  Goal: Fluid and Electrolyte Balance  Outcome: Progressing  Goal: Optimal Gastrointestinal Function  Outcome: Progressing

## 2025-06-13 NOTE — PLAN OF CARE
06/13/25 0855   Discharge Assessment   Assessment Type Discharge Planning Reassessment   Confirmed/corrected address, phone number and insurance Yes   Confirmed Demographics Correct on Facesheet   Source of Information patient   Communicated DERICK with patient/caregiver Yes   People in Home parent(s)   Name(s) of People in Home Elvira Diana (Mother)  [phone] (Home Phone)   Do you expect to return to your current living situation? Yes   Do you have help at home or someone to help you manage your care at home? Yes   Who are your caregiver(s) and their phone number(s)? Elvira Diana (Mother)  [phone] (Home Phone)   Prior to hospitilization cognitive status: Alert/Oriented;No Deficits   Current cognitive status: Alert/Oriented;No Deficits   Walking or Climbing Stairs Difficulty no   Home Accessibility not wheelchair accessible   Home Layout Able to live on 1st floor   Equipment Currently Used at Home none   Readmission within 30 days? No   Patient currently being followed by outpatient case management? No   Do you currently have service(s) that help you manage your care at home? No   Do you take prescription medications? Yes   Do you have prescription coverage? Yes   Do you have any problems affording any of your prescribed medications? No   Is the patient taking medications as prescribed? yes   Who is going to help you get home at discharge? Elvira Diana (Mother)  [phone] (Home Phone)   Discharge Plan A Home with family   Discharge Plan B Home   DME Needed Upon Discharge  none   Name(s) and Number(s) Elvira Diana (Mother)  [phone] (Home Phone)   Transition of Care Barriers None     Discharge Plan A and Plan B have been determined by review of patient's clinical status, future medical and therapeutic needs, and coverage/benefits for post-acute care in coordination with multidisciplinary team members.     Rebecca Carter, RN, BSN  Case Management  [phone]

## 2025-06-13 NOTE — CARE UPDATE
[facility]  [facility]phrology        Patient Name: Jhonny Diana   MRN: [id number]   Current Provider: Heladio Corbin MD  Primary Care Provider: Elo Bull MD   Admission Date: 6/6/2025   Hospital Day: 7  Bed: 69948/95458 A  Principal Problem: History of liver transplant            Nephrology Chart Review      Intake/Output Summary (Last 24 hours) at 6/13/2025 1601  Last data filed at 6/13/2025 0407  Gross per 24 hour   Intake --   Output 1425 ml   Net -1425 ml       Vitals:    06/13/25 0751 06/13/25 1138 06/13/25 1327 06/13/25 1518   BP:  109/61  (!) 116/56   BP Location:  Right arm  Right arm   Patient Position:  Lying  Sitting   Pulse:  85  87   Resp: 18 18 18 18   Temp:  99 °F (37.2 °C)  99.7 °F (37.6 °C)   TempSrc:  Oral  Oral   SpO2:  96%  (!) 94%   Weight:       Height:           Recent Labs   Lab 06/11/25  0542 06/12/25  0732 06/13/25  0530   * 132* 135*   K 3.0* 3.2* 3.2*   CL 96 97 97   CO2 27 26 26   BUN 27* 23* 23*   CREATININE 1.8* 1.6* 1.5*   CALCIUM 7.5* 7.5* 8.2*   PHOS 2.3* 2.3* 2.8       - Will continue to monitor     No other related issues identified. Please call Nephrology as needed; We will continue to follow.      Erasmo Krueger MD.  Clinical Nephrology Fellow, PGY-4  [facility], [address]

## 2025-06-13 NOTE — PROGRESS NOTES
PHARM.D. PRE-TRANSPLANT NOTE:    This patient's medication therapy was evaluated as part of his pre-transplant evaluation.      The following general pharmacologic concerns were noted: none    The following concerns for post-operative pain management were noted: none    The following pharmacologic concerns related to HCV therapy were noted: none      This patient's medication profile was reviewed for considerations for DAA Hepatitis C therapy:    [x]  No current inducers of CYP 3A4 or PGP  [x]  No amiodarone on this patient's EMR profile in the last 24 months  [x]  No past or current atrial fibrillation on this patient's EMR profile       Current Medications[1]        I am available for consultation and can be contacted, as needed by the other members of the Transplant team.          [1]   No current facility-administered medications for this visit.     No current outpatient medications on file.     Facility-Administered Medications Ordered in Other Visits   Medication Dose Route Frequency Provider Last Rate Last Admin    acetaminophen tablet 650 mg  650 mg Oral Q8H PRN Nilda Cruz MD   650 mg at 06/10/25 0006    albuterol-ipratropium 2.5 mg-0.5 mg/3 mL nebulizer solution 3 mL  3 mL Nebulization Q4H PRN Nilda Cruz MD        aluminum-magnesium hydroxide-simethicone 200-200-20 mg/5 mL suspension 30 mL  30 mL Oral QID PRN Nilda Cruz MD        bisacodyL suppository 10 mg  10 mg Rectal Daily PRN Nilda Cruz MD        dextrose 50% injection 12.5 g  12.5 g Intravenous PRN Nilda Cruz MD        dextrose 50% injection 25 g  25 g Intravenous PRN Nilda Cruz MD        diazePAM tablet 5 mg  5 mg Oral QHS Debo Fuentes MD   5 mg at 06/12/25 2018    glucagon (human recombinant) injection 1 mg  1 mg Intramuscular PRN Nilda Cruz MD        glucose chewable tablet 16 g  16 g Oral PRN Nilda Cruz MD        glucose chewable tablet 24 g  24 g Oral PRN Decoppet  Nilda COLE MD        melatonin tablet 6 mg  6 mg Oral Nightly PRN Nilda Cruz MD        naloxone 0.4 mg/mL injection 0.02 mg  0.02 mg Intravenous PRN Nilda Cruz MD        ondansetron injection 4 mg  4 mg Intravenous Q8H PRN Nilda Cruz MD   4 mg at 06/08/25 1816    oxyCODONE immediate release tablet Tab 30 mg  30 mg Oral Q6H PRN Nilda Cruz MD   30 mg at 06/13/25 0751    potassium bicarbonate disintegrating tablet 50 mEq  50 mEq Oral Q4H Heladio Corbin MD   50 mEq at 06/13/25 0930    prochlorperazine injection Soln 5 mg  5 mg Intravenous Q6H PRN Nilda Cruz MD        senna-docusate 8.6-50 mg per tablet 1 tablet  1 tablet Oral BID PRN Nilda Cruz MD   1 tablet at 06/12/25 0915    sirolimus tablet 1 mg  1 mg Oral Daily Nilda Cruz MD   1 mg at 06/13/25 0930

## 2025-06-13 NOTE — TELEPHONE ENCOUNTER
EZEKIEL Note  EZEKIEL called and spoke with patient about scheduling inpatient evaluation. He stated that his mother will be here over the weekend but was planning to return home as she owns a business and needs to be there to run things. EZEKIEL requested that patient call his mother to discuss, SW will call back shortly to schedule a time to meet, informed him that other team members will need to meet with them also.    EZEKIEL called patient, he stated that his mother will extend her stay into Monday, requests an appointment in the morning. EZEKIEL scheduled evaluation for Monday at 9am.

## 2025-06-14 DIAGNOSIS — Z94.4 HISTORY OF LIVER TRANSPLANT: Primary | ICD-10-CM

## 2025-06-14 PROBLEM — N17.9 ACUTE KIDNEY INJURY SUPERIMPOSED ON CKD: Status: RESOLVED | Noted: 2018-08-14 | Resolved: 2025-06-14

## 2025-06-14 PROBLEM — N18.9 ACUTE KIDNEY INJURY SUPERIMPOSED ON CKD: Status: RESOLVED | Noted: 2018-08-14 | Resolved: 2025-06-14

## 2025-06-14 PROBLEM — R60.0 LOWER EXTREMITY EDEMA: Status: RESOLVED | Noted: 2025-06-06 | Resolved: 2025-06-14

## 2025-06-14 PROBLEM — E87.1 HYPONATREMIA: Status: RESOLVED | Noted: 2025-06-07 | Resolved: 2025-06-14

## 2025-06-14 PROBLEM — B16.9 ACUTE HEPATITIS B: Status: ACTIVE | Noted: 2025-06-14

## 2025-06-14 PROBLEM — E87.6 HYPOKALEMIA: Status: RESOLVED | Noted: 2025-06-07 | Resolved: 2025-06-14

## 2025-06-14 PROBLEM — A41.9 SEPSIS: Status: RESOLVED | Noted: 2018-08-22 | Resolved: 2025-06-14

## 2025-06-14 LAB
ABSOLUTE EOSINOPHIL (OHS): 0.32 K/UL
ABSOLUTE MONOCYTE (OHS): 0.61 K/UL (ref 0.3–1)
ABSOLUTE NEUTROPHIL COUNT (OHS): 2.38 K/UL (ref 1.8–7.7)
AFP SERPL-MCNC: 4.7 NG/ML
ALBUMIN SERPL BCP-MCNC: 1.8 G/DL (ref 3.5–5.2)
ALP SERPL-CCNC: 138 UNIT/L (ref 40–150)
ALT SERPL W/O P-5'-P-CCNC: 23 UNIT/L (ref 10–44)
ANION GAP (OHS): 10 MMOL/L (ref 8–16)
AST SERPL-CCNC: 59 UNIT/L (ref 11–45)
BACTERIA BLD CULT: NORMAL
BACTERIA BLD CULT: NORMAL
BASOPHILS # BLD AUTO: 0.04 K/UL
BASOPHILS NFR BLD AUTO: 0.8 %
BILIRUB DIRECT SERPL-MCNC: 4.4 MG/DL (ref 0.1–0.3)
BILIRUB SERPL-MCNC: 5.9 MG/DL (ref 0.1–1)
BUN SERPL-MCNC: 22 MG/DL (ref 6–20)
CALCIUM SERPL-MCNC: 7.7 MG/DL (ref 8.7–10.5)
CERULOPLASMIN SERPL-MCNC: 37 MG/DL (ref 15–45)
CHLORIDE SERPL-SCNC: 100 MMOL/L (ref 95–110)
CO2 SERPL-SCNC: 27 MMOL/L (ref 23–29)
CREAT SERPL-MCNC: 1.4 MG/DL (ref 0.5–1.4)
ERYTHROCYTE [DISTWIDTH] IN BLOOD BY AUTOMATED COUNT: 14.1 % (ref 11.5–14.5)
GFR SERPLBLD CREATININE-BSD FMLA CKD-EPI: >60 ML/MIN/1.73/M2
GLUCOSE SERPL-MCNC: 87 MG/DL (ref 70–110)
HAV AB SER QL IA: NORMAL
HBV CORE AB SERPL QL IA: REACTIVE
HBV CORE IGM SERPL QL IA: REACTIVE
HBV SURFACE AB SER-ACNC: <3 MIU/ML
HBV SURFACE AB SERPL IA-ACNC: NORMAL M[IU]/ML
HBV SURFACE AG SERPL QL IA: REACTIVE
HBV SURFACE AG SERPLBLD QL IA.RAPID: ABNORMAL
HCT VFR BLD AUTO: 24.6 % (ref 40–54)
HCV AB SERPL QL IA: NORMAL
HGB BLD-MCNC: 7.9 GM/DL (ref 14–18)
HIV 1+2 AB+HIV1 P24 AG SERPL QL IA: NORMAL
IMM GRANULOCYTES # BLD AUTO: 0.02 K/UL (ref 0–0.04)
IMM GRANULOCYTES NFR BLD AUTO: 0.4 % (ref 0–0.5)
INR PPP: 1.2 (ref 0.8–1.2)
LYMPHOCYTES # BLD AUTO: 1.47 K/UL (ref 1–4.8)
MAGNESIUM SERPL-MCNC: 1.9 MG/DL (ref 1.6–2.6)
MCH RBC QN AUTO: 32 PG (ref 27–31)
MCHC RBC AUTO-ENTMCNC: 32.1 G/DL (ref 32–36)
MCV RBC AUTO: 100 FL (ref 82–98)
NUCLEATED RBC (/100WBC) (OHS): 0 /100 WBC
PHOSPHATE SERPL-MCNC: 2.4 MG/DL (ref 2.7–4.5)
PLATELET # BLD AUTO: 97 K/UL (ref 150–450)
PMV BLD AUTO: 9.7 FL (ref 9.2–12.9)
POTASSIUM SERPL-SCNC: 3.4 MMOL/L (ref 3.5–5.1)
PROT SERPL-MCNC: 5.8 GM/DL (ref 6–8.4)
PROTHROMBIN TIME: 13 SECONDS (ref 9–12.5)
RBC # BLD AUTO: 2.47 M/UL (ref 4.6–6.2)
RELATIVE EOSINOPHIL (OHS): 6.6 %
RELATIVE LYMPHOCYTE (OHS): 30.4 % (ref 18–48)
RELATIVE MONOCYTE (OHS): 12.6 % (ref 4–15)
RELATIVE NEUTROPHIL (OHS): 49.2 % (ref 38–73)
SIROLIMUS BLD-MCNC: 5.9 NG/ML (ref 4–20)
SODIUM SERPL-SCNC: 137 MMOL/L (ref 136–145)
T PALLIDUM IGG+IGM SER QL: NORMAL
T4 FREE SERPL-MCNC: 0.96 NG/DL (ref 0.71–1.51)
TSH SERPL-ACNC: 5.61 UIU/ML (ref 0.4–4)
WBC # BLD AUTO: 4.84 K/UL (ref 3.9–12.7)

## 2025-06-14 PROCEDURE — 63600175 PHARM REV CODE 636 W HCPCS: Mod: NTX

## 2025-06-14 PROCEDURE — 25000003 PHARM REV CODE 250: Mod: NTX | Performed by: STUDENT IN AN ORGANIZED HEALTH CARE EDUCATION/TRAINING PROGRAM

## 2025-06-14 PROCEDURE — 80195 ASSAY OF SIROLIMUS: CPT | Mod: NTX | Performed by: STUDENT IN AN ORGANIZED HEALTH CARE EDUCATION/TRAINING PROGRAM

## 2025-06-14 PROCEDURE — 87389 HIV-1 AG W/HIV-1&-2 AB AG IA: CPT | Mod: NTX | Performed by: STUDENT IN AN ORGANIZED HEALTH CARE EDUCATION/TRAINING PROGRAM

## 2025-06-14 PROCEDURE — 85025 COMPLETE CBC W/AUTO DIFF WBC: CPT | Mod: NTX

## 2025-06-14 PROCEDURE — 36415 COLL VENOUS BLD VENIPUNCTURE: CPT | Mod: NTX | Performed by: INTERNAL MEDICINE

## 2025-06-14 PROCEDURE — 85610 PROTHROMBIN TIME: CPT | Mod: NTX

## 2025-06-14 PROCEDURE — 86790 VIRUS ANTIBODY NOS: CPT | Mod: NTX | Performed by: STUDENT IN AN ORGANIZED HEALTH CARE EDUCATION/TRAINING PROGRAM

## 2025-06-14 PROCEDURE — 84443 ASSAY THYROID STIM HORMONE: CPT | Mod: NTX | Performed by: STUDENT IN AN ORGANIZED HEALTH CARE EDUCATION/TRAINING PROGRAM

## 2025-06-14 PROCEDURE — 86644 CMV ANTIBODY: CPT | Mod: NTX | Performed by: STUDENT IN AN ORGANIZED HEALTH CARE EDUCATION/TRAINING PROGRAM

## 2025-06-14 PROCEDURE — 3E0234Z INTRODUCTION OF SERUM, TOXOID AND VACCINE INTO MUSCLE, PERCUTANEOUS APPROACH: ICD-10-PCS | Performed by: STUDENT IN AN ORGANIZED HEALTH CARE EDUCATION/TRAINING PROGRAM

## 2025-06-14 PROCEDURE — 82390 ASSAY OF CERULOPLASMIN: CPT | Mod: NTX | Performed by: STUDENT IN AN ORGANIZED HEALTH CARE EDUCATION/TRAINING PROGRAM

## 2025-06-14 PROCEDURE — 86707 HEPATITIS BE ANTIBODY: CPT | Mod: NTX | Performed by: STUDENT IN AN ORGANIZED HEALTH CARE EDUCATION/TRAINING PROGRAM

## 2025-06-14 PROCEDURE — 99232 SBSQ HOSP IP/OBS MODERATE 35: CPT | Mod: GC,NTX,, | Performed by: INTERNAL MEDICINE

## 2025-06-14 PROCEDURE — 86787 VARICELLA-ZOSTER ANTIBODY: CPT | Mod: NTX | Performed by: STUDENT IN AN ORGANIZED HEALTH CARE EDUCATION/TRAINING PROGRAM

## 2025-06-14 PROCEDURE — 82248 BILIRUBIN DIRECT: CPT | Mod: NTX | Performed by: INTERNAL MEDICINE

## 2025-06-14 PROCEDURE — 82610 CYSTATIN C: CPT | Mod: NTX | Performed by: INTERNAL MEDICINE

## 2025-06-14 PROCEDURE — 86803 HEPATITIS C AB TEST: CPT | Mod: NTX | Performed by: STUDENT IN AN ORGANIZED HEALTH CARE EDUCATION/TRAINING PROGRAM

## 2025-06-14 PROCEDURE — 86706 HEP B SURFACE ANTIBODY: CPT | Mod: NTX | Performed by: STUDENT IN AN ORGANIZED HEALTH CARE EDUCATION/TRAINING PROGRAM

## 2025-06-14 PROCEDURE — 84100 ASSAY OF PHOSPHORUS: CPT | Mod: NTX

## 2025-06-14 PROCEDURE — 90471 IMMUNIZATION ADMIN: CPT | Mod: NTX | Performed by: INTERNAL MEDICINE

## 2025-06-14 PROCEDURE — 36415 COLL VENOUS BLD VENIPUNCTURE: CPT | Mod: NTX | Performed by: STUDENT IN AN ORGANIZED HEALTH CARE EDUCATION/TRAINING PROGRAM

## 2025-06-14 PROCEDURE — 87517 HEPATITIS B DNA QUANT: CPT | Mod: NTX | Performed by: INTERNAL MEDICINE

## 2025-06-14 PROCEDURE — 86665 EPSTEIN-BARR CAPSID VCA: CPT | Mod: NTX | Performed by: STUDENT IN AN ORGANIZED HEALTH CARE EDUCATION/TRAINING PROGRAM

## 2025-06-14 PROCEDURE — 86705 HEP B CORE ANTIBODY IGM: CPT | Mod: NTX | Performed by: STUDENT IN AN ORGANIZED HEALTH CARE EDUCATION/TRAINING PROGRAM

## 2025-06-14 PROCEDURE — 87350 HEPATITIS BE AG IA: CPT | Mod: NTX | Performed by: STUDENT IN AN ORGANIZED HEALTH CARE EDUCATION/TRAINING PROGRAM

## 2025-06-14 PROCEDURE — 20600001 HC STEP DOWN PRIVATE ROOM: Mod: NTX

## 2025-06-14 PROCEDURE — 87340 HEPATITIS B SURFACE AG IA: CPT | Mod: NTX | Performed by: STUDENT IN AN ORGANIZED HEALTH CARE EDUCATION/TRAINING PROGRAM

## 2025-06-14 PROCEDURE — 99223 1ST HOSP IP/OBS HIGH 75: CPT | Mod: NTX,,, | Performed by: INTERNAL MEDICINE

## 2025-06-14 PROCEDURE — 84439 ASSAY OF FREE THYROXINE: CPT | Mod: NTX | Performed by: STUDENT IN AN ORGANIZED HEALTH CARE EDUCATION/TRAINING PROGRAM

## 2025-06-14 PROCEDURE — 86704 HEP B CORE ANTIBODY TOTAL: CPT | Mod: NTX | Performed by: STUDENT IN AN ORGANIZED HEALTH CARE EDUCATION/TRAINING PROGRAM

## 2025-06-14 PROCEDURE — 25500020 PHARM REV CODE 255: Mod: NTX | Performed by: STUDENT IN AN ORGANIZED HEALTH CARE EDUCATION/TRAINING PROGRAM

## 2025-06-14 PROCEDURE — 25000003 PHARM REV CODE 250: Mod: NTX

## 2025-06-14 PROCEDURE — 82105 ALPHA-FETOPROTEIN SERUM: CPT | Mod: NTX | Performed by: STUDENT IN AN ORGANIZED HEALTH CARE EDUCATION/TRAINING PROGRAM

## 2025-06-14 PROCEDURE — 86593 SYPHILIS TEST NON-TREP QUANT: CPT | Mod: NTX | Performed by: STUDENT IN AN ORGANIZED HEALTH CARE EDUCATION/TRAINING PROGRAM

## 2025-06-14 PROCEDURE — 90632 HEPA VACCINE ADULT IM: CPT | Mod: NTX | Performed by: INTERNAL MEDICINE

## 2025-06-14 PROCEDURE — 83735 ASSAY OF MAGNESIUM: CPT | Mod: NTX

## 2025-06-14 PROCEDURE — 63600175 PHARM REV CODE 636 W HCPCS: Mod: NTX | Performed by: INTERNAL MEDICINE

## 2025-06-14 PROCEDURE — 86682 HELMINTH ANTIBODY: CPT | Mod: NTX | Performed by: STUDENT IN AN ORGANIZED HEALTH CARE EDUCATION/TRAINING PROGRAM

## 2025-06-14 PROCEDURE — 80053 COMPREHEN METABOLIC PANEL: CPT | Mod: NTX

## 2025-06-14 RX ORDER — POTASSIUM CHLORIDE 750 MG/1
30 CAPSULE, EXTENDED RELEASE ORAL EVERY 4 HOURS
Status: COMPLETED | OUTPATIENT
Start: 2025-06-14 | End: 2025-06-14

## 2025-06-14 RX ORDER — DIAZEPAM 5 MG/1
5 TABLET ORAL NIGHTLY PRN
Start: 2025-06-14

## 2025-06-14 RX ADMIN — OXYCODONE HYDROCHLORIDE 30 MG: 10 TABLET ORAL at 02:06

## 2025-06-14 RX ADMIN — OXYCODONE HYDROCHLORIDE 30 MG: 10 TABLET ORAL at 08:06

## 2025-06-14 RX ADMIN — POTASSIUM CHLORIDE 30 MEQ: 750 CAPSULE, EXTENDED RELEASE ORAL at 02:06

## 2025-06-14 RX ADMIN — HEPATITIS A VACCINE 1440 UNITS: 1440 INJECTION, SUSPENSION INTRAMUSCULAR at 04:06

## 2025-06-14 RX ADMIN — POTASSIUM CHLORIDE 30 MEQ: 750 CAPSULE, EXTENDED RELEASE ORAL at 10:06

## 2025-06-14 RX ADMIN — DIAZEPAM 5 MG: 5 TABLET ORAL at 08:06

## 2025-06-14 RX ADMIN — IOHEXOL 75 ML: 350 INJECTION, SOLUTION INTRAVENOUS at 05:06

## 2025-06-14 RX ADMIN — SIROLIMUS 1 MG: 1 TABLET ORAL at 08:06

## 2025-06-14 RX ADMIN — TENOFOVIR ALAFENAMIDE 25 MG: 25 TABLET ORAL at 02:06

## 2025-06-14 NOTE — CONSULTS
[facility] - Transplant Stepdown  Infectious Disease  Consult Note    Patient Name: Jhonny Diana  MRN: [id number]  Admission Date: 6/6/2025  Hospital Length of Stay: 8 days  Attending Physician: Heladio Corbin MD  Primary Care Provider: Elo Bull MD     Isolation Status: No active isolations    Patient information was obtained from patient, parent, past medical records, and ER records.      Inpatient consult to Infectious Diseases  Consult performed by: Hilario Montoya MD  Consult ordered by: Roya Lizarraga MD        Assessment/Plan:     GI  * History of liver transplant  Assessment and Plan    1. Risks of Infection: Available serologies were reviewed. No unusual risks of infection or significant barriers to transplantation were identified from the infectious disease standpoint given the information available at this time.    - Acute infectious issues: None   - Pending serologies: Hepatitis B core Ab and Strongyloides IgG   - Please call if any pending serologic testing is positive.    2. Immunizations:  Based on the patient's immunization history and serologies, the following immunizations are recommended:  - Hepatitis A    Patient does not have immunity to hepatitis A    Vaccination ordered today: No. Reason for not ordering: Other   - Hepatitis B    Patient does not have immunity to hepatitis B    Vaccination ordered today: No. Reason for not ordering: Hep B serologies reactive.    - COVID    Current CDC vaccination recommendations were discussed with the patient   - Annual high dose influenza     Vaccination ordered today: No. Reason for not ordering: Other   - Prevnar 20    Vaccination ordered today: No. Reason for not ordering: Vaccination up to date   - Tdap    Vaccination ordered today: No. Reason for not ordering: Vaccination up to date   - Shingrix    Vaccination ordered today: No. Reason for not ordering: Other    Recommended Pre-Transplant Immunization  Schedule   Vaccine  0m 1m 2m 6m   Pneumococcal conjugate vaccine (Prevnar 20) X      Tetanus-diphtheria-pertussis (Tdap)* X      Hepatitis A Vaccine (Havrix)** X   X   Hepatitis B Vaccine (Heplisav)** X X     Influenza (annual) X      Zoster Recombinant Vaccine (Shingrix) X  X           *Administer booster every 10 years.       **Administer if no immunity demonstrated on serologies                   3. Counseling:   I discussed with the patient the risk for increased susceptibility to infections following transplantation including increased risk for infection right after transplant and if rejection should occur.  The patient has been counseled on the importance of vaccinations to decrease risk of infection and severe illness. Specific guidance has been provided to the patient regarding the patient's occupation, hobbies and activities to avoid future infectious complications.     4. Transplant Candidacy: Based on available information, there are no identified significant barriers to transplantation from an infectious disease standpoint.  Final determination of transplant candidacy will be made once evaluation is complete and reviewed by the Selection Committee.      Follow up with infectious disease as needed.       The total time for evaluation and management services performed on 06/14/2025 was greater than 30 minutes.         Thank you for your consult. I will sign off. Please contact us if you have any additional questions.    Hilario Montoya MD  Infectious Disease  [address] - Transplant Stepdown    Subjective:     Principal Problem: History of liver transplant    HPI:     PRE-TRANSPLANT INFECTIOUS DISEASE CONSULT    Reason for Visit:  Pre-transplant evaluation  Referring Provider: Provider [facility]     History of Present Illness:    34 y.o. male with a history of EtOH cirrhosis s/p liver transplant 2017, on sirolimus, umbilical hernia repair with mesh placement 4/2025, presented to [facility] 6/2/2025  with SOB and jaundice. He was found to have large right pleural effusion, underwent thoracentesis 6/3/2025 with removal of 1L fluid -  with 15% PMN, RBC 20K. Noted to have ascites, but not enough for paracentesis. Blood cultures 1/2 with Staph hominis likely contaminant. ID now consulted for  pre-liver transplant evaluation.    Infectious History:  Recent hospital admissions: Yes  Recent infections: Yes  Recent or current antibiotic use: Yes  History of recurrent infections *(sinus / pneumonia / UTI / SBP)*: No  History of diabetic foot wound, bone/joint infection: No  Recent dental infections, issues or procedures: No. Endorses filling that recently fell out.   History of chicken pox: Yes  History of shingles: No  History of STI: No    History of Immunosuppression:  Prior chemotherapy / immunosuppression: Yes  Prior transplant: Yes  History of splenectomy: No    Tuberculosis:  Prior screening for latent TB: Yes  Prior diagnosis of latent TB: No  Risk factors for TB *known exposure, incarceration, homelessness*: No. Endorses was briefly exposed to someone years ago undergoing TB treatment at the time.    Geographical exposures:  Currently lives in [place], [place] with his mother  Lived in the following states: [place], [place], [place], [place], [place]  Lived in or travelled to [facility] [place]: No  International travel: No  Travel-associated illness: No    Social/Environmental:  Occupation:  Not currently employed.  Pets: Yes; adult cat   Livestock: No  Fishing / hunting: No  Hobbies: music, reading   Water: [place] water  Consumption of raw/undercooked meat or seafood?  No  Tobacco: No  Alcohol: No  Recreational drug use:  Yes (medical marijuana)  Sexual partners: denies recent partners.       Past Histories:   Past Medical History:   Diagnosis Date    Abdominal pain 05/27/2018    Alcoholic cirrhosis 08/14/2018    Alcoholic hepatitis with ascites     Anemia     CKD (chronic kidney disease) stage 3, GFR 30-59 ml/min 08/14/2018    Encounter  for blood transfusion     multiple blood transfusions    ESRD on dialysis     Herpes zoster without complication 2019    History of hematemesis 2017    Hypertension     Seizures     Substance abuse     alcohol and opioids    Thrombocytopenia     Transplanted liver     10/2017     Past Surgical History:   Procedure Laterality Date    APPENDECTOMY      CENTRAL VENOUS CATHETER TUNNELED INSERTION DOUBLE LUMEN      inserted and removed    ERCP      ERCP N/A 2018    Procedure: ERCP;  Surgeon: Solis Villanueva MD;  Location: [facility] ([address] [place] [address]);  Service: Endoscopy;  Laterality: N/A;  dialysis/cirrhosis/labs prior to ERCP/svn  platelet    ESOPHAGOGASTRODUODENOSCOPY      ESOPHAGOGASTRODUODENOSCOPY N/A 2019    Procedure: EGD (ESOPHAGOGASTRODUODENOSCOPY);  Surgeon: Sheri Siddiqui MD;  Location: [facility] ([address] [place] [address]);  Service: Endoscopy;  Laterality: N/A;    LIVER TRANSPLANT      10/2017    REPAIR, HERNIA, INGUINAL, WITHOUT HISTORY OF PRIOR REPAIR, AGE 5 YEARS OR OLDER Left 2024    Procedure: REPAIR, HERNIA, INGUINAL, WITHOUT HISTORY OF PRIOR REPAIR, AGE 5 YEARS OR OLDER;  Surgeon: Elian Bazan MD;  Location: [facility] OR [address] [place] [address];  Service: General;  Laterality: Left;     Family History   Problem Relation Name Age of Onset    No Known Problems Mother      Cancer Father          Lung cancer    Alcohol abuse Father      No Known Problems Sister 3     No Known Problems Brother 1     Kidney disease Neg Hx       Social History     Tobacco Use    Smoking status: Former     Current packs/day: 0.00     Average packs/day: 1 pack/day for 10.0 years (10.0 ttl pk-yrs)     Types: Cigarettes     Start date: 2007     Quit date: 2017     Years since quittin.3     Passive exposure: Current    Smokeless tobacco: Never    Tobacco comments:     Medical Marijuana smoker.    Substance Use Topics    Alcohol use: No     Comment: a fifth of liquor daily for years, cut back over last 2 months    Drug use: No      Review of patient's allergies indicates:   Allergen Reactions    Bactrim [sulfamethoxazole-trimethoprim] Other (See Comments)     Mookie Trell Syndrome         Immunization History:  Received all childhood vaccines: Yes  All household members receive annual flu vaccine: Yes  All household members are up to date on COVID vaccine: Yes      Labs:    CBC:   Lab Results   Component Value Date    WBC 4.84 06/14/2025    HGB 7.9 (L) 06/14/2025    HCT 24.6 (L) 06/14/2025     (H) 06/14/2025    PLT 97 (L) 06/14/2025    GRAN 3.4 11/26/2024    GRAN 63.1 11/26/2024    LYMPH 30.4 06/14/2025    LYMPH 1.47 06/14/2025    MONO 12.6 06/14/2025    MONO 0.61 06/14/2025    EOSINOPHIL 3.7 11/26/2024       Syphilis screening:   Lab Results   Component Value Date    RPR Non-reactive 08/14/2018    TREPABIGMIGG Non-Reactive 06/14/2025        TB screening:   Lab Results   Component Value Date    TBGOLDPLUS Negative 08/14/2018    TSPOTSCREN See result image under hyperlink 10/11/2017       HIV screening:   Lab Results   Component Value Date    KWY62BPUV Non-Reactive 06/14/2025       Strongyloides IgG:   Lab Results   Component Value Date    STRONGANTIGG Negative 08/14/2018       Hepatitis Serologies:   Lab Results   Component Value Date    HEPAIGG Non-Reactive 06/14/2025    HEPBCAB Reactive (A) 06/14/2025    HEPBSAB Non-Reactive 06/14/2025    HEPCAB Non-Reactive 06/14/2025        Varicella IgG:   Lab Results   Component Value Date    VARICELLAINT Positive (A) 08/14/2018         Immunization History   Administered Date(s) Administered    COVID-19, MRNA, LN-S, PF (Pfizer) (Purple Cap) 03/03/2021, 03/29/2021, 12/12/2021    DTP 1989, 1989, 02/14/1990, 05/03/1991, 05/05/1994    Hepatitis A, Adult 08/27/2018    Hepatitis A, Pediatric/Adolescent, 2 Dose 10/11/2017    Hepatitis B, Adult 10/12/2017    Hepatitis B, Pediatric/Adolescent 11/24/1998, 02/15/1999, 07/07/1999    Hib-HbOC 02/23/1993    Influenza - Trivalent - Fluzone  High Dose - PF (65 years and older) 10/12/2017    MMR 05/03/1991, 05/05/1994    OPV 1989, 1989, 05/03/1991, 05/05/1994    Pneumococcal Conjugate - 13 Valent 10/12/2017    Pneumococcal Conjugate - 20 Valent 10/11/2023    Pneumococcal Polysaccharide - 23 Valent 08/27/2018    Tdap 10/11/2017            Review of Systems   All other systems reviewed and are negative.    Objective:     Vital Signs (Most Recent):  Temp: 100 °F (37.8 °C) (06/14/25 1223)  Pulse: 83 (06/14/25 1223)  Resp: 18 (06/14/25 1412)  BP: (!) 114/59 (06/14/25 1223)  SpO2: 97 % (06/14/25 1223) Vital Signs (24h Range):  Temp:  [98.5 °F (36.9 °C)-100 °F (37.8 °C)] 100 °F (37.8 °C)  Pulse:  [83-89] 83  Resp:  [16-18] 18  SpO2:  [94 %-100 %] 97 %  BP: (106-118)/(53-59) 114/59     Weight: 67.7 kg (149 lb 2.3 oz)  Body mass index is 24.07 kg/m².    Estimated Creatinine Clearance: 66.5 mL/min (based on SCr of 1.4 mg/dL).     Physical Exam  Vitals reviewed.   Constitutional:       General: He is not in acute distress.     Appearance: He is well-developed. He is not diaphoretic.   HENT:      Head: Normocephalic and atraumatic.      Nose: Nose normal.   Eyes:      General: Scleral icterus present.   Pulmonary:      Effort: Pulmonary effort is normal. No respiratory distress.   Abdominal:      General: Abdomen is flat. There is distension.   Musculoskeletal:      Cervical back: Normal range of motion.      Right lower leg: Edema present.      Left lower leg: Edema present.   Skin:     General: Skin is warm and dry.      Coloration: Skin is jaundiced.      Findings: No erythema or rash.   Neurological:      Mental Status: He is alert.   Psychiatric:         Behavior: Behavior normal.          Significant Labs: CBC:   Recent Labs   Lab 06/13/25  0530 06/14/25  0556   WBC 6.56 4.84   HGB 9.9* 7.9*   HCT 29.6* 24.6*   * 97*     CMP:   Recent Labs   Lab 06/13/25  0530 06/14/25  0556   * 137   K 3.2* 3.4*   CL 97 100   CO2 26 27   GLU 83 87    BUN 23* 22*   CREATININE 1.5* 1.4   CALCIUM 8.2* 7.7*   PROT 6.6 5.8*   ALBUMIN 2.1* 1.8*   BILITOT 7.4* 5.9*   ALKPHOS 145 138   AST 64* 59*   ALT 26 23   ANIONGAP 12 10       Significant Imaging: I have reviewed all pertinent imaging results/findings within the past 24 hours.

## 2025-06-14 NOTE — ASSESSMENT & PLAN NOTE
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 Follows with Dr. Morgan. Hx of anastomotic stricture requiring dilatation and stenting. Had been having uptrending bilirubin and MRCP ordered outpatient, done at [facility] as outpatient. Admitted to [facility] with Tbili of 8, ascites, transudative pleural effusion.   -positive for hep B core antibody, surface antigen.  Negative for hep B surface antibody.  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  No longer on mycophenolate  - valaclovir PPx continued

## 2025-06-14 NOTE — SUBJECTIVE & OBJECTIVE
Review of Systems   All other systems reviewed and are negative.    Objective:     Vital Signs (Most Recent):  Temp: 100 °F (37.8 °C) (06/14/25 1223)  Pulse: 83 (06/14/25 1223)  Resp: 18 (06/14/25 1412)  BP: (!) 114/59 (06/14/25 1223)  SpO2: 97 % (06/14/25 1223) Vital Signs (24h Range):  Temp:  [98.5 °F (36.9 °C)-100 °F (37.8 °C)] 100 °F (37.8 °C)  Pulse:  [83-89] 83  Resp:  [16-18] 18  SpO2:  [94 %-100 %] 97 %  BP: (106-118)/(53-59) 114/59     Weight: 67.7 kg (149 lb 2.3 oz)  Body mass index is 24.07 kg/m².    Estimated Creatinine Clearance: 66.5 mL/min (based on SCr of 1.4 mg/dL).     Physical Exam  Vitals reviewed.   Constitutional:       General: He is not in acute distress.     Appearance: He is well-developed. He is not diaphoretic.   HENT:      Head: Normocephalic and atraumatic.      Nose: Nose normal.   Eyes:      General: Scleral icterus present.   Pulmonary:      Effort: Pulmonary effort is normal. No respiratory distress.   Abdominal:      General: Abdomen is flat. There is distension.   Musculoskeletal:      Cervical back: Normal range of motion.      Right lower leg: Edema present.      Left lower leg: Edema present.   Skin:     General: Skin is warm and dry.      Coloration: Skin is jaundiced.      Findings: No erythema or rash.   Neurological:      Mental Status: He is alert.   Psychiatric:         Behavior: Behavior normal.          Significant Labs: CBC:   Recent Labs   Lab 06/13/25  0530 06/14/25  0556   WBC 6.56 4.84   HGB 9.9* 7.9*   HCT 29.6* 24.6*   * 97*     CMP:   Recent Labs   Lab 06/13/25  0530 06/14/25  0556   * 137   K 3.2* 3.4*   CL 97 100   CO2 26 27   GLU 83 87   BUN 23* 22*   CREATININE 1.5* 1.4   CALCIUM 8.2* 7.7*   PROT 6.6 5.8*   ALBUMIN 2.1* 1.8*   BILITOT 7.4* 5.9*   ALKPHOS 145 138   AST 64* 59*   ALT 26 23   ANIONGAP 12 10       Significant Imaging: I have reviewed all pertinent imaging results/findings within the past 24 hours.

## 2025-06-14 NOTE — ASSESSMENT & PLAN NOTE
Follows with pain specialist Takes 30mg oxycodone QID   - continue oxycodone 30mg q6h PRN to avoid withdrawals while inpatient

## 2025-06-14 NOTE — CONSULTS
[facility]   DEPARTMENT OF PSYCHIATRY     IDENTIFIERS & DEMOGRAPHICS:     SERVICE: Addiction  ENCOUNTER: initial    -- PATIENT IDENTIFIERS: Jhonny Diana  [id number]  1990  34 y.o.  male  -- LOCATION OF PATIENT: unit (med/surg)    -- ENCOUNTER PROVIDER: William Sistrunk, MD        PRESENTATION:     OVERVIEW OF THE HPI:    Mr. 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 His post-transplant period was notable for LAURA previously on HD, anastomotic stricture requiring dilatation and stenting. He has followed with Dr. Julio in Hepatology, though his last clinic visit was 5/30/23. He is now followed by Dr. Morgan in Transplant Clinic. Per Dr. Michel at [facility], he is only on Sirolimus 1mg daily (of note, this was held a short time after his hernia repair).  He was recently admitted to [facility] in [place] on 6/2 with SOB, jaundice, and anasarca. Since admit, he has been taking his home Sirolimis. Psychiatry Consulted for liver transplant eval.       SUBJECTIVE/CURRENT FINDINGS:    On interview, pt is cooperative and pleasant. He reports that he is feeling okay with congruent affect. He explains he has been sober since 2017 from etoh and did not relapse in 2019. He has a good support system at home. He attended and graduated rehab in 2017 prior to his first liver transplant. He desires to continue sobriety. He does use occasional THC gummies for sleep that are prescription.     REVIEW OF SYSTEMS:     N   Sleep Disturbance/Disruption   N   Appetite/Weight Change   N   Alterations in Energy Level   N   Impaired Focus/Concentration   N   Depressive Symptomatology   N   Excessive Anxiety/Worry   N   Dysregulated Mood/Behavior   N   Manic  Symptomatology   N   Psychosis   N   Trauma-Related   N   Impulsivity/Compulsivity/Obsessionality   N   Disordered Eating   N   Dissociation   N   Pain   N   Cardiopulmonary Symptoms   N   Abnormal Involuntary Movements    Regarding the current presentation, no other significant issues or complaints are voiced or known at this time.       ADD-ON PSYCHOTHERAPY:     ADD-ON THERAPY     HISTORY:     >> SOURCES: patient, chart review       PSYCH  SUBSTANCE  FAMILY  SOCIAL  MEDICAL     Y   Previous/Pre-Existing Psychiatric Diagnoses  AUD   N   Past Psychotropic Trials   N   Current Psychiatric Provider   Y   Hx of Outpatient Psychiatric Treatment   N   Hx of Psychiatric Hospitalization   N   Documented Hx of Malingering   N   Hx of Psychosis   N   Hx of Bipolar Diathesis   N   Hx of Depression   Y   Hx of Anxiety   N   Hx of Insomnia   N   Hx of Delirium     Y   Hx of Formal DEVAN Treatment   Y   Recent Alcohol Consumption  sober now   Y   Hx of Nicotine Use   Y   Hx of Alcohol Misuse/Abuse   N   Hx of Illicit Drug Misuse/Abuse   N   Hx of Prescription Drug Misuse/Abuse   +   Drug Experimentation/Usage  +cannabis       U   Family Psychiatric History      N   Hx of Trauma   N   Hx of Abuse     N   Developmental Delay/Disability   Y   GED/High School Diploma   Y   Post-Secondary Education   N   Currently Employed   Y   Currently on Disability   U   Financially Stable   Y   Functions Independently   Y   Domiciled   Y   Intact Support System   N   Heterosexual/Cisgender   U   Currently in a Relationship   U   Ever    U   Ever /   U   Children/Dependents   U   Buddhist/Spiritual   U   Hobbies/Recreational Activities   U   Hx of  Service     N   Ever Charged/Convicted   N   Current  "Probation/[place]/Diversion   N   Hx of Incarceration     N   Hx of Seizures   N   Hx of Head Trauma     +   Key Diagnoses  +cirrhosis      >> SCHEDULED AND PRN MEDS: reviewed/reconciled  see MEDCARD      Allergies:  Bactrim [sulfamethoxazole-trimethoprim]     EXAMINATION:     VITALS:  BP (!) 105/52 (BP Location: Left arm, Patient Position: Lying)   Pulse 82   Temp 98.9 °F (37.2 °C) (Oral)   Resp 18   Ht 5' 6" (1.676 m)   Wt 67.7 kg (149 lb 2.3 oz)   SpO2 96%   BMI 24.07 kg/m²     MENTAL STATUS EXAMINATION:  Appearance: appears stated age, normal weight  appropriately dressed, adequately groomed, in no apparent distress, well-appearing    Behavior & Attitude: participative, under good behavioral control, able to redirect, appropriate eye contact  calm, engaged, agreeable, cooperative    Movements & Motor Activity: no psychomotor agitation, no psychomotor retardation, normal gait, normal station, ambulates without assistance, not wheelchair bound (able to ambulate), no weakness, no spasticity, no rigidity, no tics, no tremor, no akathisia, no dyskinesia, no ataxia, no parkinsonism    Speech & Language: normal rate, normal volume, normal quantity, normal latency, spontaneous, reciprocal, fluent    Mood: fine/good  Affect: euthymic, reactive, full range  appropriate given the situation/context, mood congruent    Thought Process & Associations: linear, goal-directed, organized, logical, coherent, relevant, abstract  no loosening of associations    Thought Content & Perceptions: no delusions, no paranoid ideation, no ideas of reference, no grandiosity, no hyperreligiosity, no hallucinations, no responding to internal stimuli    Sensorium: awake, alert, clear  no confusion, no delirium    Orientation: grossly intact, oriented to person, oriented to place, oriented to time, oriented to situation    Recent & Remote Memory: intact (recent), intact (remote)    Attention & Concentration: " intact  attentive to conversation, not easily distracted    Fund of Knowledge: intact, vocabulary proficient    Insight: intact, good  demonstrates sufficient awareness of condition/situation    Judgment: intact, good  heeds instructions/advice      PSYCHIATRIC SCREENINGS:      RISK & REGULATORY:      RISK PARAMETERS (current to the encounter/episode  NOT inclusive of past history):     N   Suicidal Ideation/Threats   N   Suicide Attempts/Gestures   N   Homicidal Ideation/Threats   N   Homicidal Behavior   N   Non-Suicidal Self-Injurious Behavior   N   Perpetrated Violence     FIREARMS & WEAPONS:     N   Ready Access to Firearms   Y   Gun Safety Counseled  e.g., proper storage, inherent risk     REGULATORY:      INFORMED CONSENT & SHARED DECISION MAKING are the hallmark and bedrock of good clinical care, and as such have been employed and obtained, respectively, to the degree possible.  Discussed, to the extent possible, diagnosis, risks and benefits of proposed treatment (e.g., medication, therapy) vs alternative treatments vs no treatment, potential side effects of these treatments, and the inherent unpredictability of treatment.        WARNINGS & PRECAUTIONS:  >> In cases of emergencies (e.g. SI/HI resulting in danger to self or others, functioning deteriorating to the level of grave disability), call 911 or [phone], or present to the emergency department for immediate assistance.    >> Individuals should not operate a motor vehicle or heavy machinery if effects of medications or underlying symptoms/condition impair the ability to do so safely.    >> FULLY comply with ANY/ALL medication as prescribed/instructed and report ANY/ALL suspected adverse effects to appropriate health care providers.       ASSESSMENT & PLAN:     DIAGNOSES & PROBLEMS:       1.  AUD in remission    2.  Liver Transplant Eval    -- ASSESSMENT (synthesis  analysis):     Liver Transplant eval: moderate  risk due to past hx of AUD. Sober since 2017. Completed rehab for AUD in 2017.     -- PLAN (goals  recommentations):     Can consider a PETH for confirmation of sobriety       - counseled on full abstinence from alcohol and substances of abuse (illicit and prescription)  - relapse prevention and motivational interviewing provided  - provided resources for various addiction rehabilitation options as part of aftercare planning         CHART REVIEW: available documentation has been reviewed, and pertinent elements of the chart have been incorporated into this evaluation where appropriate.       DIAGNOSTIC TESTING:      Glu 87  6/14/2025  Li *   *  TSH 5.613 (H)  6/14/2025    HgA1c *   *  VPA *   *   FT4 0.96  6/14/2025    Na 137  6/14/2025  CLZ *   *  WBC 4.84  6/14/2025    Cr 1.4  6/14/2025  ANC 3.4; 63.1;   11/26/2024   Hgb 7.9 (L)  6/14/2025     BUN 22 (H)  6/14/2025  Trop I 0.007  9/19/2024  HCT 24.6 (L)  6/14/2025     GFR >60  6/14/2025   CPK *   *  PLT 97 (L)  6/14/2025     Alb 1.8 (L)  6/14/2025   PRL *   *  B12 *   *     T Bili 5.9 (H)  6/14/2025  Chol *   *  B9 *   *      6/14/2025  TGs *   *  B1 *   *    AST 59 (H)  6/14/2025  HDL *   *  Vit D *   *     ALT 23  6/14/2025  LDL *   *  HIV Non-Reactive  6/14/2025     INR 1.2  6/14/2025  Radha *   *   Hep C Non-Reactive  6/14/2025    GGT *   *  Lip 33  11/25/2024  RPR *   *     (H)  6/14/2025   NH4 81 (H)  9/19/2024  UPT *   *      PETH <10  9/21/2024  THC Presumptive Positive (A)  9/19/2024    ETOH <10  9/19/2024  VIVI Negative  9/19/2024    EtG *   *  AMP Negative  9/19/2024    ALC *   *  OPI Presumptive Positive (A)  9/19/2024    BZO Presumptive Positive (A)  9/19/2024  MTD Negative  9/19/2024     BAR Negative  9/19/2024  BUP *   *    PCP Negative  9/19/2024  FEN *   *     Results for orders placed or performed during the hospital encounter of 06/06/25   EKG 12-lead    Collection Time:  06/06/25  5:42 PM   Result Value Ref Range    QRS Duration 104 ms    OHS QTC Calculation 485 ms    Narrative    Test Reason : R78.81,    Vent. Rate :  96 BPM     Atrial Rate :  96 BPM     P-R Int : 190 ms          QRS Dur : 104 ms      QT Int : 384 ms       P-R-T Axes :  20 -12  23 degrees    QTcB Int : 485 ms    Normal sinus rhythm  Prolonged QT  Abnormal R wave progression in the precordial leads  Nonspecific ST and/or T wave abnormalities  Leftward axis  Abnormal ECG  When compared with ECG of 19-Sep-2024 16:26,  T wave inversion now evident in Inferior leads  Nonspecific T wave abnormality now evident in Anterior leads  Confirmed by Brandon Busch ([id number]) on 6/7/2025 8:53:16 AM    Referred By: PROVIDER NOTINSYSTEM           Confirmed By: Brandon Busch        POWELL & LINKS:        Y  = yes/endorses     N  = no/denies     U  = unknown/unable to assess    ADHD   AIMS   AUDIT   AUDIT-C   C-SSRS (Screen)   C-SSRS (Short)   C-SSRS (Full)   DAST   DAST-10   SUSAN-7   MoCA   PCL-5   PHQ-9   DEVAN   YMRS     Inpatient consult to Psychiatry  Consult performed by: Sistrunk, William, MD  Consult ordered by: Roya Lizarraga MD        [facility] TRANSPLANT STEPDOWN

## 2025-06-14 NOTE — ASSESSMENT & PLAN NOTE
Patient found to have large pleural effusion on imaging.  A thoracentesis was performed. Pleural fluid was sent for analysis.Fluid previously most consistent with Transudate. Studies from 6/9 were exudative by Lights criteria which may have been skewed by diuretics. Most likely etiology includes Cirrhosis. Management to include Diuresis    -Presented with new onset SOB at OSH. Found to have Large right pleural effusion causing compressive atelectasis of most of the right lung on 6/2 CXR. Underwent thora  -Right US guided thoracentesis on 6/3 with removal of 1000 cc of reddish-brown otherwise nonturbid fluid. Thora fluid studies: Gluc 91, , pH 8, Prot 3.5,  with 15% PMN, RBC 20,6000.   -Previous studies consistent with transudative effusion. Likely from ascites  -CT chest with enlarged effusion compared to prior studies at this hospital   -6/9: Exudative by Light's criteria due to protein content. Possibly confounded due to diuretic use  -per pulmonology, may be non chylous pleural effusion 2/2 sirolimus given effusion coinciding with pt restarting the med in April   -thoracentesis performed 06/09, 1.5 L removed.  Cytology negative for malignant cells  - follow-up repeat chest x-ray

## 2025-06-14 NOTE — ASSESSMENT & PLAN NOTE
Patient's most recent potassium results are listed below.   Recent Labs     06/11/25  0542 06/12/25  0732 06/13/25  0530   K 3.0* 3.2* 3.2*     Plan  - Replete potassium per protocol  - Monitor potassium Daily  - Patient's hypokalemia is stable

## 2025-06-14 NOTE — CONSULTS
[facility] - Transplant Stepdown  Adult Nutrition  Consult Note    SUMMARY     Recommendations  Continue regular diet  Nursing, continue documenting PO intake via flowsheets  Monitor labs, meds, weight, skin    Goals: meet % een/epn by next RD f/u  Nutrition Goal Status: new  Communication of RD Recs: other (comment) (poc)    Nutrition Discharge Planning     Nutrition Discharge Planning: Therapeutic diet (comments) (Low sodium diet)  Therapeutic diet (comments): 2gm Na education provided on 6/14/25. Discussed foods to limit or avoid and benefits of diet adherence. Post-transplant expectations/guidelines also introduced. Appropriate education material with RD contact information provided. No other needs identified.      Reason for Assessment    Reason For Assessment: consult  Diagnosis: liver disease (liver transplant workup)  General Information Comments: 34M with alcoholic hepatitis s/p liver transplant 10/19/2017 on Sirolimus 1 mg daily with post transplant course complicated by alcohol relapse 2019 (most recent PETH 9/2024 <10), anastomotic stricture requiring stenting. His post-transplant period was notable for LAURA previously on HD, anastomotic stricture requiring dilatation and stenting, and ETOH relapse (6/2019). RD consulted for assessment of nutritional status and recommendation for liver transplant work-up. No wounds noted. Saw pt at bedside. Was emotional and crying. Agreed to talk to RD. States his mom brought him some food. Ate some toast and sausage at breakfast. Poor appetite PTA d/t shortness of breath. RD provided low sodium diet edu w/ handout and RD contact information. RD to f/u and monitor.  Nutrition Related Social Determinants of Health: SDOH: None Identified  Food Insecurity: No Food Insecurity (6/8/2025)    Hunger Vital Sign     Worried About Running Out of Food in the Last Year: Never true     Ran Out of Food in the Last Year: Never true   Recent Concern: Food Insecurity - Food  "Insecurity Present (6/6/2025)    Hunger Vital Sign     Worried About Running Out of Food in the Last Year: Sometimes true     Ran Out of Food in the Last Year: Sometimes true     Nutrition/Diet History    Spiritual, Cultural Beliefs, Hinduism Practices, Values that Affect Care: no  Food Allergies: NKFA    Anthropometrics    Height: 5' 6" (167.6 cm)  Height (inches): 66 in  Height Method: Stated  Weight: 67.7 kg (149 lb 2.3 oz)  Weight (lb): 149.14 lb  Weight Method: Standard Scale  Ideal Body Weight (IBW), Male: 142 lb  % Ideal Body Weight, Male (lb): 114.79 %  BMI (Calculated): 24.1  BMI Grade: 18.5-24.9 - normal       Lab/Procedures/Meds    Pertinent Labs Reviewed: reviewed  Pertinent Labs Comments: Hemoglobin 7.9 (L), potassium 3.4 (L), Phos 2.4 (L), AST 59 (H)  Pertinent Medications Reviewed: reviewed  Pertinent Medications Comments: Potassium chloride    Estimated/Assessed Needs    Weight Used For Calorie Calculations: 67.7 kg (149 lb 4 oz)  Energy Calorie Requirements (kcal): 1864 (msj * 1.2 PAL)  Energy Need Method: Glades-St Jeor  Protein Requirements:  (1-1.5 gkg)  Weight Used For Protein Calculations: 67.7 kg (149 lb 4 oz)     Estimated Fluid Requirement Method: RDA Method  RDA Method (mL): 1864         Nutrition Prescription Ordered    Current Diet Order: Regular    Evaluation of Received Nutrient/Fluid Intake    I/O: -3.6 L since admit  Comments: LBM 6/13  % Intake of Estimated Energy Needs: 25 - 50 %  % Meal Intake: 25 - 50 %    PES Statement  Increased nutrient needs related to Chronic illness as evidenced by Other (comment) (liver transplant workup)  Status: New    Nutrition Risk    Level of Risk/Frequency of Follow-up: moderate (f/u 1-2x/week)       Monitor and Evaluation    Monitor and Evaluation: Food and beverage intake, Diet order, Weight, Electrolyte and renal panel, Gastrointestinal profile, Glucose/endocrine profile, Inflammatory profile, Lipid profile       Nutrition Follow-Up    RD " Follow-up?: Yes

## 2025-06-14 NOTE — ASSESSMENT & PLAN NOTE
Assessment and Plan    1. Risks of Infection: Available serologies were reviewed. No unusual risks of infection or significant barriers to transplantation were identified from the infectious disease standpoint given the information available at this time.    - Acute infectious issues: None   - Pending serologies: Hepatitis B core Ab and Strongyloides IgG   - Please call if any pending serologic testing is positive.    2. Immunizations:  Based on the patient's immunization history and serologies, the following immunizations are recommended:  - Hepatitis A    Patient does not have immunity to hepatitis A    Vaccination ordered today: No. Reason for not ordering: Other   - Hepatitis B    Patient does not have immunity to hepatitis B    Vaccination ordered today: No. Reason for not ordering: Hep B serologies reactive.    - COVID    Current CDC vaccination recommendations were discussed with the patient   - Annual high dose influenza     Vaccination ordered today: No. Reason for not ordering: Other   - Prevnar 20    Vaccination ordered today: No. Reason for not ordering: Vaccination up to date   - Tdap    Vaccination ordered today: No. Reason for not ordering: Vaccination up to date   - Shingrix    Vaccination ordered today: No. Reason for not ordering: Other    Recommended Pre-Transplant Immunization Schedule   Vaccine  0m 1m 2m 6m   Pneumococcal conjugate vaccine (Prevnar 20) X      Tetanus-diphtheria-pertussis (Tdap)* X      Hepatitis A Vaccine (Havrix)** X   X   Hepatitis B Vaccine (Heplisav)** X X     Influenza (annual) X      Zoster Recombinant Vaccine (Shingrix) X  X           *Administer booster every 10 years.       **Administer if no immunity demonstrated on serologies                   3. Counseling:   I discussed with the patient the risk for increased susceptibility to infections following transplantation including increased risk for infection right after transplant and if rejection should occur.  The  patient has been counseled on the importance of vaccinations to decrease risk of infection and severe illness. Specific guidance has been provided to the patient regarding the patient's occupation, hobbies and activities to avoid future infectious complications.     4. Transplant Candidacy: Based on available information, there are no identified significant barriers to transplantation from an infectious disease standpoint.  Final determination of transplant candidacy will be made once evaluation is complete and reviewed by the Selection Committee.      Follow up with infectious disease as needed.       The total time for evaluation and management services performed on 06/14/2025 was greater than 30 minutes.

## 2025-06-14 NOTE — PLAN OF CARE
AAOx4. VSS. C/o generalized pain. Oxy 30mg PRN x2 given. Pt ambulating around room independently. Mother @ bedside, attentive to patient. Bed in low position. Side rails raisedx2, wheels locked. Nonskid shoes when OOB. Call bell and personal items within reach.    home

## 2025-06-14 NOTE — ASSESSMENT & PLAN NOTE
Mostly resolved at this point.    Hx of LAURA on CKD at OSH with cr rising to 2.5. Most recently before transfer was 1.69. baseline ~ 1.6-1.7. Started on Lasix 80 IV daily, spironolactone 100mg PO daily  at OSH with minor improvement in edema.     -now improving  - monitor UOP  - nephrology consulted, likely 2/2 diuretics/intravascular fluid shift vs vancomycin    LAURA is likely due to unclear. Baseline creatinine is 1.6-1.7. Most recent creatinine and eGFR are listed below.  Recent Labs     06/11/25  0542 06/12/25  0732 06/13/25  0530   CREATININE 1.8* 1.6* 1.5*   EGFRNORACEVR 50* 57* >60      Plan  - LAURA is stable  - Avoid nephrotoxins and renally dose meds for GFR listed above  - Monitor urine output, serial BMP, and adjust therapy as needed

## 2025-06-14 NOTE — PROGRESS NOTES
[facility] - Transplant [facility] Medicine  Progress Note    Patient Name: Jhonny Diana  MRN: [id number]  Patient Class: IP- Inpatient   Admission Date: 6/6/2025  Length of Stay: 8 days  Attending Physician: Heladio Corbin MD  Primary Care Provider: Elo Bull MD        Subjective     Principal Problem:History of liver transplant        HPI:   Per  note:    Mr. 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 His post-transplant period was notable for LAURA previously on HD, anastomotic stricture requiring dilatation and stenting, and ETOH relapse (6/2019). He has followed with Dr. Julio in Hepatology, though his last clinic visit was 5/30/23. He is now followed by Dr. Morgan in Transplant Clinic. Per Dr. Michel at [facility], he is only on Sirolimus 1mg daily (of note, this was held a short time after his hernia repair).    He was recently admitted to [facility] in [place] on 6/2 with SOB, jaundice, and anasarca. Since admit, he has been taking his home Sirolimis.    He was ultimately found to have a large right pleural effusion. He had a right US guided thoracentesis on 6/3 with removal of 1000 cc of reddish-brown otherwise nonturbid fluid. Thora fluid studies: Gluc 91, , pH 8, Prot 3.5,  with 15% PMN, RBC 20,6000. He also had ascites, Abdominal U/S: Only trace fluid right upper quadrant insufficient for ultrasound-guided drainage.     He was also found to be in LAURA with Cr of 1.98, which then went to 2.02, and has now improved to 1.8 again with IV diuresis (Lasix + Metolazone). Nephrology there was consulted. At admit: WBC 7.1, Hg 10.6, BOY880, AST 46, ALT 28, TB 8.2, NH3 57, , INR 1.3. TB today has improved to 5.6.    Yesterday, unexpectedly his  blood cultures 2/2 turned positive for GPC. He was started on Vancomycin and Rocephin 2g iv daily. They do not have ID service there and have not done TTE or BRADEN. Given elevated TB and bacteremia, GI there recommended transfer to center with Hepatology to help manage him with history of XPL.    Current VS 6/4 at 4pm: T 98.0F, P 80, RR 80, /55.    CXR 6/2: Large right pleural effusion causing compressive atelectasis of most of the right lung. Left lung is clear.The trachea appears severely narrowed and the right mainstem bronchus is not well seen, raising the possibility of a mediastinal mass.     Overview/Hospital Course:  Mr. Diana is a 34-year-old gentleman with history of liver transplant in 2017 following alcoholic hepatitis.  Transplant complicated by anastomotic stricture that required stent as well as treatment of acute rejection in 2024 requiring steroids.  He is admitted with decompensated cirrhosis involving volume overload.  Decompensated cirrhosis seems to be related to new hepatitis-B infection.  Found to be positive for hepatitis-B core antibody and surface antigen.  Negative for surface antibody, indicating insufficient immunity.  Patient believes he may have been infected by an infidelitous ex-sexual partner  Patient had large right-sided pleural effusion here of uncertain etiology.  Treated with thoracentesis on 06/03 with clinical improvement.  Continues to have partly loculated right pleural fluid collection on chest x-ray 6/13.  Patient treated for sepsis with vancomycin and Zosyn, but antibiotics discontinued on 06/11 following negative cultures.  6/8 liver biopsy pathology shows significant fibrosis without active steatohepatitis.   Hepatitis-B serologies were discovered on 06/14 as part of evaluation for re-transplantation.  Starting patient on tenofovir.  Will obtain additional serologies to better evaluate suspected acute infection.    Interval History:  Patient feels better overall  with improved lower extremity edema and improved exercise tolerance.    Review of Systems  Objective:     Vital Signs (Most Recent):  Temp: 100 °F (37.8 °C) (06/14/25 1223)  Pulse: 83 (06/14/25 1223)  Resp: 18 (06/14/25 1412)  BP: (!) 114/59 (06/14/25 1223)  SpO2: 97 % (06/14/25 1223) Vital Signs (24h Range):  Temp:  [98.5 °F (36.9 °C)-100 °F (37.8 °C)] 100 °F (37.8 °C)  Pulse:  [83-89] 83  Resp:  [16-18] 18  SpO2:  [94 %-100 %] 97 %  BP: (106-118)/(53-59) 114/59     Weight: 67.7 kg (149 lb 2.3 oz)  Body mass index is 24.07 kg/m².    Intake/Output Summary (Last 24 hours) at 6/14/2025 1651  Last data filed at 6/14/2025 0629  Gross per 24 hour   Intake 500 ml   Output 600 ml   Net -100 ml      Physical Exam  Vitals and nursing note reviewed.   Constitutional:       General: He is not in acute distress.     Appearance: He is ill-appearing.   HENT:      Head: Normocephalic.   Eyes:      General: Scleral icterus present.   Cardiovascular:      Rate and Rhythm: Normal rate and regular rhythm.      Heart sounds: Normal heart sounds. No murmur heard.  Pulmonary:      Breath sounds: No decreased breath sounds, wheezing, rhonchi or rales.   Abdominal:      General: There is no distension.      Palpations: Abdomen is soft.      Tenderness: There is no abdominal tenderness.   Musculoskeletal:      Right lower leg: Edema present.      Left lower leg: Edema present.   Skin:     Coloration: Skin is jaundiced.   Neurological:      General: No focal deficit present.      Mental Status: He is alert and oriented to person, place, and time.   Psychiatric:         Mood and Affect: Mood normal.         Behavior: Behavior normal.         MELD 3.0: 22 at 6/14/2025  5:56 AM  MELD-Na: 18 at 6/14/2025  5:56 AM  Calculated from:  Serum Creatinine: 1.4 mg/dL at 6/14/2025  5:56 AM  Serum Sodium: 137 mmol/L at 6/14/2025  5:56 AM  Total Bilirubin: 5.9 mg/dL at 6/14/2025  5:56 AM  Serum Albumin: 1.8 g/dL at 6/14/2025  5:56 AM  INR(ratio): 1.2 at  6/14/2025  5:56 AM  Age at listing (hypothetical): 34 years  Sex: Male at 6/14/2025  5:56 AM      Significant Labs:  CBC:  Recent Labs   Lab 06/13/25  0530 06/14/25  0556   WBC 6.56 4.84   HGB 9.9* 7.9*   HCT 29.6* 24.6*   * 97*     CMP:  Recent Labs   Lab 06/13/25  0530 06/14/25  0556   * 137   K 3.2* 3.4*   CL 97 100   CO2 26 27   GLU 83 87   BUN 23* 22*   CREATININE 1.5* 1.4   CALCIUM 8.2* 7.7*   PROT 6.6 5.8*   ALBUMIN 2.1* 1.8*   BILITOT 7.4* 5.9*   ALKPHOS 145 138   AST 64* 59*   ALT 26 23   ANIONGAP 12 10     PTINR:  Recent Labs   Lab 06/13/25  0528 06/14/25  0556   INR 1.1 1.2       Significant Procedures:   Dobutamine Stress Test with Color Flow: No results found. However, due to the size of the patient record, not all encounters were searched. Please check Results Review for a complete set of results.    thoracentesis      Assessment & Plan  Pleural effusion    Patient found to have large pleural effusion on imaging.  A thoracentesis was performed. Pleural fluid was sent for analysis.Fluid previously most consistent with Transudate. Studies from 6/9 were exudative by Lights criteria which may have been skewed by diuretics. Most likely etiology includes Cirrhosis. Management to include Diuresis    -Presented with new onset SOB at OSH. Found to have Large right pleural effusion causing compressive atelectasis of most of the right lung on 6/2 CXR. Underwent thora  -Right US guided thoracentesis on 6/3 with removal of 1000 cc of reddish-brown otherwise nonturbid fluid. Thora fluid studies: Gluc 91, , pH 8, Prot 3.5,  with 15% PMN, RBC 20,6000.   -Previous studies consistent with transudative effusion. Likely from ascites  -CT chest with enlarged effusion compared to prior studies at this hospital   -6/9: Exudative by Light's criteria due to protein content. Possibly confounded due to diuretic use  -per pulmonology, may be non chylous pleural effusion 2/2 sirolimus given effusion  coinciding with pt restarting the med in April   -thoracentesis performed 06/09, 1.5 L removed.  Cytology negative for malignant cells  - follow-up repeat chest x-ray      History of liver transplant  Hyperbilirubinemia  Immunosuppression  Acute hepatitis B  Liver tx in 2017 2/2 Etoh cirrhosis. Follows with Dr. Morgan. Hx of anastomotic stricture requiring dilatation and stenting. Had been having uptrending bilirubin and MRCP ordered outpatient, done at [facility] as outpatient. Admitted to [facility] with Tbili of 8, ascites, transudative pleural effusion.   -positive for hep B core antibody, surface antigen.  Negative for hep B surface antibody.  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  No longer on mycophenolate  - valaclovir PPx continued         Ascites  Likely 2/2 decreased oncotic pressure due to hyopalbuminemia, inflammation/infection  Started on Lasix 80 IV daily, metolazone, spironolactone 100mg PO daily      Opioid dependence  Chronic pain  Follows with pain specialist Takes 30mg oxycodone QID   - continue oxycodone 30mg q6h PRN to avoid withdrawals while inpatient     CKD stage 3a, GFR 45-59 ml/min  Creatine stable for now. BMP reviewed- noted Estimated Creatinine Clearance: 66.5 mL/min (based on SCr of 1.4 mg/dL). according to latest data. Based on current GFR, CKD stage is stage 2 - GFR 60-89.  Monitor UOP and serial BMP and adjust therapy as needed. Renally dose meds. Avoid nephrotoxic medications and procedures.  VTE Risk Mitigation (From admission, onward)           Ordered     IP VTE LOW RISK PATIENT  Once         06/06/25 1604     Place sequential compression device  Until discontinued         06/06/25 1604                    Discharge Planning   DERICK: 6/16/2025     Code Status: Full Code    Medical Readiness for Discharge Date: 6/14/2025  Discharge Plan A: Home with family                        Heladio Corbin MD  Department of Hospital Medicine   [facility] - Transplant Stepdown

## 2025-06-14 NOTE — PROGRESS NOTES
[facility] Progress Note      Attending: Heladio Corbin MD   Admit Date: 6/6/2025  Today's Date: 06/14/2025    SUBJECTIVE:     Interval History:   No complaints this morning.    Scheduled Medications:    diazePAM  5 mg Oral QHS    potassium chloride  30 mEq Oral Q4H    sirolimus  1 mg Oral Daily       PRN Medications:     Current Facility-Administered Medications:     acetaminophen, 650 mg, Oral, Q8H PRN    albuterol-ipratropium, 3 mL, Nebulization, Q4H PRN    aluminum-magnesium hydroxide-simethicone, 30 mL, Oral, QID PRN    bisacodyL, 10 mg, Rectal, Daily PRN    dextrose 50%, 12.5 g, Intravenous, PRN    dextrose 50%, 25 g, Intravenous, PRN    glucagon (human recombinant), 1 mg, Intramuscular, PRN    glucose, 16 g, Oral, PRN    glucose, 24 g, Oral, PRN    melatonin, 6 mg, Oral, Nightly PRN    naloxone, 0.02 mg, Intravenous, PRN    ondansetron, 4 mg, Intravenous, Q8H PRN    oxyCODONE, 30 mg, Oral, Q6H PRN    prochlorperazine, 5 mg, Intravenous, Q6H PRN    senna-docusate, 1 tablet, Oral, BID PRN    OBJECTIVE:     Vital Signs Trends/Hx Reviewed  Vitals:    06/14/25 0217 06/14/25 0813 06/14/25 0858 06/14/25 1223   BP: (!) 115/57  (!) 106/53 (!) 114/59   BP Location: Right arm  Right arm Right arm   Patient Position: Lying  Lying Lying   Pulse: 86  89 83   Resp: 18 18 18 18   Temp: 98.9 °F (37.2 °C)  99.4 °F (37.4 °C) 100 °F (37.8 °C)   TempSrc: Oral  Oral Oral   SpO2: 100%  95% 97%   Weight:       Height:             Physical Exam  Vitals reviewed.   Constitutional:       General: He is not in acute distress.     Appearance: He is not diaphoretic.   Eyes:      General: Scleral icterus present.   Cardiovascular:      Rate and Rhythm: Normal rate.   Pulmonary:      Effort: Pulmonary effort is normal.   Abdominal:      General: There is no distension.      Tenderness: There is no abdominal tenderness.   Neurological:      Mental Status: He is alert.          Laboratory:  Lab results in last 24 hours  reviewed.     MELD 3.0: 22 at 6/14/2025  5:56 AM  MELD-Na: 18 at 6/14/2025  5:56 AM  Calculated from:  Serum Creatinine: 1.4 mg/dL at 6/14/2025  5:56 AM  Serum Sodium: 137 mmol/L at 6/14/2025  5:56 AM  Total Bilirubin: 5.9 mg/dL at 6/14/2025  5:56 AM  Serum Albumin: 1.8 g/dL at 6/14/2025  5:56 AM  INR(ratio): 1.2 at 6/14/2025  5:56 AM  Age at listing (hypothetical): 34 years  Sex: Male at 6/14/2025  5:56 AM      ASSESSMENT & RECOMMENDATIONS   34M with alcoholic hepatitis s/p liver transplant 10/19/2017 on Sirolimus 1 mg daily with post transplant course complicated by alcohol relapse 2019 (most recent PETH 9/2024 <10), anastomotic stricture requiring stenting, LAURA previously on HD followed by Dr. Morgan, umbilical hernia repair 11/2022, open L inguinal hernia repair 1/2025 and R inguinal hernia repair 4/2025, persistently elevated LFTs since most recent hernia repair who initially presented to [facility] with SOB and jaundice, found to have a large R pleural effusion s/p thoracentesis, elevated liver enzymes and LAURA, ultimately sent to [facility] on 6/06 for higher level of care. Hepatology consulted for IS management and hyperbilirubinemia. We recommended liver bx which was completed on 6/8, HVPG 11 concerning for portal hypertension. He spiked a fever on 6/8 to 101F, started on broad spectrum abx. He underwent repeat thoracentesis with Pulm on 6/9, 1.5 L removed, exudative based on light's criteria. Pulmonology thinks the effusion could be related to use of Sirolimus as there has been some data with development of effusions. PETH <10. He is s/p TJ liver bx, which showed minimal non-specific portal inflammation with stage 2 fibrosis, negative for ACR.     Problem List:  Alcoholic hepatitis s/p liver transplant on IS  Elevated liver enzymes  Stage 2 fibrosis on liver bx 2024, now with imaging concerning for cirrhosis  Alcohol use disorder (in remission)  LAURA  Pleural effusion s/p thoracentesisx2  LE edema  Hepatitis B infection      Recommendations:  -Liver transplant evaluation opened. Additional testing pending. Triple phase CT, txp ID, palliative and addiction psych consults.   -Start Vemlidy 25 mg daily. HBV DNA, Hep B C IgM and Hep B e Ag/Ab pending.   -Pt states he already had an MRCP previously which did not show biliary obstruction. Will attempt to request those records.   -Continue Sirolimus 1mg daily with daily Am Sirolimus levels.    Thank you for allowing us to participate in the care of this patient. Please call with questions.     Roya Lizarraga MD  Gastroenterology Fellow, PGY-V  [facility]

## 2025-06-14 NOTE — ASSESSMENT & PLAN NOTE
Creatine stable for now. BMP reviewed- noted Estimated Creatinine Clearance: 66.5 mL/min (based on SCr of 1.4 mg/dL). according to latest data. Based on current GFR, CKD stage is stage 2 - GFR 60-89.  Monitor UOP and serial BMP and adjust therapy as needed. Renally dose meds. Avoid nephrotoxic medications and procedures.

## 2025-06-14 NOTE — SUBJECTIVE & OBJECTIVE
Interval History:  Patient feels better overall with improved lower extremity edema and improved exercise tolerance.    Review of Systems  Objective:     Vital Signs (Most Recent):  Temp: 100 °F (37.8 °C) (06/14/25 1223)  Pulse: 83 (06/14/25 1223)  Resp: 18 (06/14/25 1412)  BP: (!) 114/59 (06/14/25 1223)  SpO2: 97 % (06/14/25 1223) Vital Signs (24h Range):  Temp:  [98.5 °F (36.9 °C)-100 °F (37.8 °C)] 100 °F (37.8 °C)  Pulse:  [83-89] 83  Resp:  [16-18] 18  SpO2:  [94 %-100 %] 97 %  BP: (106-118)/(53-59) 114/59     Weight: 67.7 kg (149 lb 2.3 oz)  Body mass index is 24.07 kg/m².    Intake/Output Summary (Last 24 hours) at 6/14/2025 1651  Last data filed at 6/14/2025 0629  Gross per 24 hour   Intake 500 ml   Output 600 ml   Net -100 ml      Physical Exam  Vitals and nursing note reviewed.   Constitutional:       General: He is not in acute distress.     Appearance: He is ill-appearing.   HENT:      Head: Normocephalic.   Eyes:      General: Scleral icterus present.   Cardiovascular:      Rate and Rhythm: Normal rate and regular rhythm.      Heart sounds: Normal heart sounds. No murmur heard.  Pulmonary:      Breath sounds: No decreased breath sounds, wheezing, rhonchi or rales.   Abdominal:      General: There is no distension.      Palpations: Abdomen is soft.      Tenderness: There is no abdominal tenderness.   Musculoskeletal:      Right lower leg: Edema present.      Left lower leg: Edema present.   Skin:     Coloration: Skin is jaundiced.   Neurological:      General: No focal deficit present.      Mental Status: He is alert and oriented to person, place, and time.   Psychiatric:         Mood and Affect: Mood normal.         Behavior: Behavior normal.         MELD 3.0: 22 at 6/14/2025  5:56 AM  MELD-Na: 18 at 6/14/2025  5:56 AM  Calculated from:  Serum Creatinine: 1.4 mg/dL at 6/14/2025  5:56 AM  Serum Sodium: 137 mmol/L at 6/14/2025  5:56 AM  Total Bilirubin: 5.9 mg/dL at 6/14/2025  5:56 AM  Serum Albumin: 1.8  g/dL at 6/14/2025  5:56 AM  INR(ratio): 1.2 at 6/14/2025  5:56 AM  Age at listing (hypothetical): 34 years  Sex: Male at 6/14/2025  5:56 AM      Significant Labs:  CBC:  Recent Labs   Lab 06/13/25  0530 06/14/25  0556   WBC 6.56 4.84   HGB 9.9* 7.9*   HCT 29.6* 24.6*   * 97*     CMP:  Recent Labs   Lab 06/13/25  0530 06/14/25  0556   * 137   K 3.2* 3.4*   CL 97 100   CO2 26 27   GLU 83 87   BUN 23* 22*   CREATININE 1.5* 1.4   CALCIUM 8.2* 7.7*   PROT 6.6 5.8*   ALBUMIN 2.1* 1.8*   BILITOT 7.4* 5.9*   ALKPHOS 145 138   AST 64* 59*   ALT 26 23   ANIONGAP 12 10     PTINR:  Recent Labs   Lab 06/13/25  0528 06/14/25  0556   INR 1.1 1.2       Significant Procedures:   Dobutamine Stress Test with Color Flow: No results found. However, due to the size of the patient record, not all encounters were searched. Please check Results Review for a complete set of results.    thoracentesis

## 2025-06-14 NOTE — PLAN OF CARE
-AAOx4, VSS, afebrile, on room air  -PRN pain meds given, see MAR  -Tenofovir started  -New 18g PIV placed in left forearm  -Hep A vaccine administered  -No complaints at this time   -No falls reported this shift  -Bed low and locked with call light in reach  -Mom at the bedside and attentive to pt

## 2025-06-14 NOTE — ASSESSMENT & PLAN NOTE
Likely 2/2 decreased oncotic pressure due to hyopalbuminemia, inflammation/infection  Started on Lasix 80 IV daily, metolazone, spironolactone 100mg PO daily

## 2025-06-14 NOTE — ASSESSMENT & PLAN NOTE
Hyponatremia is likely due to Cirrhosis. The patient's most recent sodium results are listed below.  Recent Labs     06/11/25  0542 06/12/25  0732 06/13/25  0530   * 132* 135*     Plan  - Monitor sodium Daily.   - Patient hyponatremia is stable

## 2025-06-14 NOTE — PLAN OF CARE
Recommendations  Continue regular diet  Nursing, continue documenting PO intake via flowsheets  Monitor labs, meds, weight, skin    Goals: meet % een/epn by next RD f/u  Nutrition Goal Status: new  Communication of RD Recs: other (comment) (poc)

## 2025-06-14 NOTE — PROGRESS NOTES
[facility] - Transplant [facility] Medicine  Progress Note    Patient Name: Jhonny Diana  MRN: [id number]  Patient Class: IP- Inpatient   Admission Date: 6/6/2025  Length of Stay: 7 days  Attending Physician: Heladio Corbin MD  Primary Care Provider: Elo Bull MD        Subjective     Principal Problem:History of liver transplant        HPI:   Per  note:    Mr. 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 His post-transplant period was notable for LAURA previously on HD, anastomotic stricture requiring dilatation and stenting, and ETOH relapse (6/2019). He has followed with Dr. Julio in Hepatology, though his last clinic visit was 5/30/23. He is now followed by Dr. Morgan in Transplant Clinic. Per Dr. Michel at [facility], he is only on Sirolimus 1mg daily (of note, this was held a short time after his hernia repair).    He was recently admitted to [facility] in [place] on 6/2 with SOB, jaundice, and anasarca. Since admit, he has been taking his home Sirolimis.    He was ultimately found to have a large right pleural effusion. He had a right US guided thoracentesis on 6/3 with removal of 1000 cc of reddish-brown otherwise nonturbid fluid. Thora fluid studies: Gluc 91, , pH 8, Prot 3.5,  with 15% PMN, RBC 20,6000. He also had ascites, Abdominal U/S: Only trace fluid right upper quadrant insufficient for ultrasound-guided drainage.     He was also found to be in LAURA with Cr of 1.98, which then went to 2.02, and has now improved to 1.8 again with IV diuresis (Lasix + Metolazone). Nephrology there was consulted. At admit: WBC 7.1, Hg 10.6, FKE433, AST 46, ALT 28, TB 8.2, NH3 57, , INR 1.3. TB today has improved to 5.6.    Yesterday, unexpectedly his  blood cultures 2/2 turned positive for GPC. He was started on Vancomycin and Rocephin 2g iv daily. They do not have ID service there and have not done TTE or BRADEN. Given elevated TB and bacteremia, GI there recommended transfer to center with Hepatology to help manage him with history of XPL.    Current VS 6/4 at 4pm: T 98.0F, P 80, RR 80, /55.    CXR 6/2: Large right pleural effusion causing compressive atelectasis of most of the right lung. Left lung is clear.The trachea appears severely narrowed and the right mainstem bronchus is not well seen, raising the possibility of a mediastinal mass.     Overview/Hospital Course:  Mr. Diana is a 34-year-old gentleman with history of liver transplant in 2017 following alcoholic hepatitis.  Transplant complicated by anastomotic stricture that required stent as well as treatment of acute rejection in 2024 requiring steroids.  He is admitted with decompensated cirrhosis involving volume overload.  Patient had large right-sided pleural effusion here of uncertain etiology.  Treated with thoracentesis on 06/03 with clinical improvement.  Patient treated for sepsis with vancomycin and Zosyn, but antibiotics discontinued on 06/11 following negative cultures.  6/8 liver biopsy pathology shows significant fibrosis without active steatohepatitis.  Hepatology uncertain how cirrhosis re-developed.  Patient now undergoing evaluation for re-transplantation.    Interval History:  No new events overnight.  No new complaints.  Lower extremity edema continues to improve.    Review of Systems  Objective:     Vital Signs (Most Recent):  Temp: 98.8 °F (37.1 °C) (06/13/25 2019)  Pulse: 83 (06/13/25 2019)  Resp: 18 (06/13/25 2019)  BP: (!) 118/59 (06/13/25 2019)  SpO2: 95 % (06/13/25 2019) Vital Signs (24h Range):  Temp:  [98.6 °F (37 °C)-99.7 °F (37.6 °C)] 98.8 °F (37.1 °C)  Pulse:  [83-91] 83  Resp:  [18-20] 18  SpO2:  [93 %-96 %] 95 %  BP: (105-118)/(55-61) 118/59     Weight: 67.7 kg (149  lb 2.3 oz)  Body mass index is 24.07 kg/m².    Intake/Output Summary (Last 24 hours) at 6/13/2025 2251  Last data filed at 6/13/2025 0407  Gross per 24 hour   Intake --   Output 475 ml   Net -475 ml      Physical Exam  Vitals and nursing note reviewed.   Constitutional:       General: He is not in acute distress.     Appearance: He is ill-appearing.   HENT:      Head: Normocephalic.   Eyes:      General: Scleral icterus present.   Cardiovascular:      Rate and Rhythm: Regular rhythm. Tachycardia present.      Heart sounds: Normal heart sounds. No murmur heard.  Pulmonary:      Breath sounds: No decreased breath sounds, wheezing, rhonchi or rales.   Abdominal:      General: There is no distension.      Palpations: Abdomen is soft.      Tenderness: There is no abdominal tenderness.   Musculoskeletal:      Right lower leg: Edema present.      Left lower leg: Edema present.   Skin:     Coloration: Skin is jaundiced.   Neurological:      General: No focal deficit present.      Mental Status: He is alert and oriented to person, place, and time.   Psychiatric:         Mood and Affect: Mood normal.         Behavior: Behavior normal.         MELD 3.0: 23 at 6/13/2025  5:30 AM  MELD-Na: 20 at 6/13/2025  5:30 AM  Calculated from:  Serum Creatinine: 1.5 mg/dL at 6/13/2025  5:30 AM  Serum Sodium: 135 mmol/L at 6/13/2025  5:30 AM  Total Bilirubin: 7.4 mg/dL at 6/13/2025  5:30 AM  Serum Albumin: 2.1 g/dL at 6/13/2025  5:30 AM  INR(ratio): 1.1 at 6/13/2025  5:28 AM  Age at listing (hypothetical): 34 years  Sex: Male at 6/13/2025  5:30 AM      Significant Labs:  CBC:  Recent Labs   Lab 06/12/25  0732 06/13/25  0530   WBC 4.07 6.56   HGB 8.0* 9.9*   HCT 24.2* 29.6*   PLT 79* 120*     CMP:  Recent Labs   Lab 06/12/25  0732 06/13/25  0530   * 135*   K 3.2* 3.2*   CL 97 97   CO2 26 26   * 83   BUN 23* 23*   CREATININE 1.6* 1.5*   CALCIUM 7.5* 8.2*   PROT 5.4* 6.6   ALBUMIN 1.7* 2.1*   BILITOT 5.4* 7.4*   ALKPHOS 130 145    AST 53* 64*   ALT 20 26   ANIONGAP 9 12     PTINR:  Recent Labs   Lab 06/12/25  0732 06/13/25  0528   INR 1.2 1.1       Significant Procedures:   Dobutamine Stress Test with Color Flow: No results found. However, due to the size of the patient record, not all encounters were searched. Please check Results Review for a complete set of results.    thoracentesis      Assessment & Plan  Pleural effusion    Patient found to have large pleural effusion on imaging.  A thoracentesis was performed. Pleural fluid was sent for analysis.Fluid previously most consistent with Transudate. Studies from 6/9 were exudative by Lights criteria which may have been skewed by diuretics. Most likely etiology includes Cirrhosis. Management to include Diuresis    -Presented with new onset SOB at OSH. Found to have Large right pleural effusion causing compressive atelectasis of most of the right lung on 6/2 CXR. Underwent thora  -Right US guided thoracentesis on 6/3 with removal of 1000 cc of reddish-brown otherwise nonturbid fluid. Thora fluid studies: Gluc 91, , pH 8, Prot 3.5,  with 15% PMN, RBC 20,6000.   -Previous studies consistent with transudative effusion. Likely from ascites  -CT chest with enlarged effusion compared to prior studies at this hospital   -6/9: Exudative by Light's criteria due to protein content. Possibly confounded due to diuretic use  -per pulmonology, may be non chylous pleural effusion 2/2 sirolimus given effusion coinciding with pt restarting the med in April   -thoracentesis performed 06/09, 1.5 L removed.  Cytology negative for malignant cells  - follow-up repeat chest x-ray      History of liver transplant  Hyperbilirubinemia  Immunosuppression  Liver tx in 2017 2/2 Etoh cirrhosis. Follows with Dr. Morgan. Hx of anastomotic stricture requiring dilatation and stenting. Had been having uptrending bilirubin and MRCP ordered outpatient, done at Osh as outpatient. Admitted to Osh with Tbili of 8,  ascites, transudative pleural effusion.   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  Liver biopsy consistent with cirrhosis.  Patient undergo redo-transplant evaluation  - f/u MRCP results from OSH   - continue sirolimus 1mg BID  - valaclovir PPx continued     Ascites  Lower extremity edema  Likely 2/2 decreased oncotic pressure due to hyopalbuminemia, inflammation/infection  Started on Lasix 80 IV daily, metolazone, spironolactone 100mg PO daily      Sepsis  This patient does have evidence of infective focus  My overall impression is sepsis without organ dysfunction.  Source: Intra-abdominal Infection  Given lack of symptom, resolution of fevers, negative workup, discontinued antibiotics.  Will continue to monitor.  Acute kidney injury superimposed on CKD  Mostly resolved at this point.    Hx of LAURA on CKD at OSH with cr rising to 2.5. Most recently before transfer was 1.69. baseline ~ 1.6-1.7. Started on Lasix 80 IV daily, spironolactone 100mg PO daily  at OSH with minor improvement in edema.     -now improving  - monitor UOP  - nephrology consulted, likely 2/2 diuretics/intravascular fluid shift vs vancomycin    LAURA is likely due to unclear. Baseline creatinine is 1.6-1.7. Most recent creatinine and eGFR are listed below.  Recent Labs     06/11/25  0542 06/12/25  0732 06/13/25  0530   CREATININE 1.8* 1.6* 1.5*   EGFRNORACEVR 50* 57* >60      Plan  - LAURA is stable  - Avoid nephrotoxins and renally dose meds for GFR listed above  - Monitor urine output, serial BMP, and adjust therapy as needed  Opioid dependence  Chronic pain  Follows with pain specialist Takes 30mg oxycodone QID   - continue oxycodone 30mg q6h PRN to avoid withdrawals while inpatient     Hypokalemia  Patient's most recent potassium results are listed below.   Recent Labs     06/11/25  0542 06/12/25  0732  06/13/25  0530   K 3.0* 3.2* 3.2*     Plan  - Replete potassium per protocol  - Monitor potassium Daily  - Patient's hypokalemia is stable  Hyponatremia  Hyponatremia is likely due to Cirrhosis. The patient's most recent sodium results are listed below.  Recent Labs     06/11/25  0542 06/12/25  0732 06/13/25  0530   * 132* 135*     Plan  - Monitor sodium Daily.   - Patient hyponatremia is stable  CKD stage 3a, GFR 45-59 ml/min  Creatine stable for now. BMP reviewed- noted Estimated Creatinine Clearance: 62 mL/min (A) (based on SCr of 1.5 mg/dL (H)). according to latest data. Based on current GFR, CKD stage is stage 2 - GFR 60-89.  Monitor UOP and serial BMP and adjust therapy as needed. Renally dose meds. Avoid nephrotoxic medications and procedures.  VTE Risk Mitigation (From admission, onward)           Ordered     IP VTE LOW RISK PATIENT  Once         06/06/25 1604     Place sequential compression device  Until discontinued         06/06/25 1604                    Discharge Planning   DERICK: 6/16/2025     Code Status: Full Code   Medical Readiness for Discharge Date:   Discharge Plan A: Home with family                        Heladio Corbin MD  Department of Hospital Medicine   [facility] - Transplant Stepdown

## 2025-06-15 LAB
ABSOLUTE EOSINOPHIL (OHS): 0.23 K/UL
ABSOLUTE MONOCYTE (OHS): 0.58 K/UL (ref 0.3–1)
ABSOLUTE NEUTROPHIL COUNT (OHS): 2.1 K/UL (ref 1.8–7.7)
ALBUMIN SERPL BCP-MCNC: 1.9 G/DL (ref 3.5–5.2)
ALP SERPL-CCNC: 130 UNIT/L (ref 40–150)
ALT SERPL W/O P-5'-P-CCNC: 19 UNIT/L (ref 10–44)
ANION GAP (OHS): 9 MMOL/L (ref 8–16)
AST SERPL-CCNC: 59 UNIT/L (ref 11–45)
BACTERIA FLD CULT: NORMAL
BASOPHILS # BLD AUTO: 0.04 K/UL
BASOPHILS NFR BLD AUTO: 1 %
BILIRUB DIRECT SERPL-MCNC: 4.5 MG/DL (ref 0.1–0.3)
BILIRUB SERPL-MCNC: 6.1 MG/DL (ref 0.1–1)
BUN SERPL-MCNC: 23 MG/DL (ref 6–20)
CALCIUM SERPL-MCNC: 7.9 MG/DL (ref 8.7–10.5)
CHLORIDE SERPL-SCNC: 103 MMOL/L (ref 95–110)
CO2 SERPL-SCNC: 25 MMOL/L (ref 23–29)
CREAT SERPL-MCNC: 1.6 MG/DL (ref 0.5–1.4)
ERYTHROCYTE [DISTWIDTH] IN BLOOD BY AUTOMATED COUNT: 14 % (ref 11.5–14.5)
GFR SERPLBLD CREATININE-BSD FMLA CKD-EPI: 57 ML/MIN/1.73/M2
GLUCOSE SERPL-MCNC: 98 MG/DL (ref 70–110)
HCT VFR BLD AUTO: 23.3 % (ref 40–54)
HGB BLD-MCNC: 7.7 GM/DL (ref 14–18)
IMM GRANULOCYTES # BLD AUTO: 0.03 K/UL (ref 0–0.04)
IMM GRANULOCYTES NFR BLD AUTO: 0.7 % (ref 0–0.5)
INR PPP: 1.2 (ref 0.8–1.2)
LYMPHOCYTES # BLD AUTO: 1.19 K/UL (ref 1–4.8)
MAGNESIUM SERPL-MCNC: 1.9 MG/DL (ref 1.6–2.6)
MCH RBC QN AUTO: 32.9 PG (ref 27–31)
MCHC RBC AUTO-ENTMCNC: 33 G/DL (ref 32–36)
MCV RBC AUTO: 100 FL (ref 82–98)
NUCLEATED RBC (/100WBC) (OHS): 0 /100 WBC
PHOSPHATE SERPL-MCNC: 2.6 MG/DL (ref 2.7–4.5)
PLATELET # BLD AUTO: 89 K/UL (ref 150–450)
PMV BLD AUTO: 9.7 FL (ref 9.2–12.9)
POTASSIUM SERPL-SCNC: 3.5 MMOL/L (ref 3.5–5.1)
PROT SERPL-MCNC: 5.6 GM/DL (ref 6–8.4)
PROTHROMBIN TIME: 13.2 SECONDS (ref 9–12.5)
RBC # BLD AUTO: 2.34 M/UL (ref 4.6–6.2)
RELATIVE EOSINOPHIL (OHS): 5.5 %
RELATIVE LYMPHOCYTE (OHS): 28.5 % (ref 18–48)
RELATIVE MONOCYTE (OHS): 13.9 % (ref 4–15)
RELATIVE NEUTROPHIL (OHS): 50.4 % (ref 38–73)
SIROLIMUS BLD-MCNC: 6.4 NG/ML (ref 4–20)
SODIUM SERPL-SCNC: 137 MMOL/L (ref 136–145)
WBC # BLD AUTO: 4.17 K/UL (ref 3.9–12.7)

## 2025-06-15 PROCEDURE — 63600175 PHARM REV CODE 636 W HCPCS: Mod: NTX

## 2025-06-15 PROCEDURE — 85610 PROTHROMBIN TIME: CPT | Mod: NTX

## 2025-06-15 PROCEDURE — 63600175 PHARM REV CODE 636 W HCPCS: Mod: NTX | Performed by: STUDENT IN AN ORGANIZED HEALTH CARE EDUCATION/TRAINING PROGRAM

## 2025-06-15 PROCEDURE — 25000003 PHARM REV CODE 250: Mod: NTX

## 2025-06-15 PROCEDURE — 99232 SBSQ HOSP IP/OBS MODERATE 35: CPT | Mod: GC,NTX,, | Performed by: INTERNAL MEDICINE

## 2025-06-15 PROCEDURE — 85025 COMPLETE CBC W/AUTO DIFF WBC: CPT | Mod: NTX

## 2025-06-15 PROCEDURE — 80053 COMPREHEN METABOLIC PANEL: CPT | Mod: NTX

## 2025-06-15 PROCEDURE — 82248 BILIRUBIN DIRECT: CPT | Mod: NTX | Performed by: INTERNAL MEDICINE

## 2025-06-15 PROCEDURE — 20600001 HC STEP DOWN PRIVATE ROOM: Mod: NTX

## 2025-06-15 PROCEDURE — 25000003 PHARM REV CODE 250: Mod: NTX | Performed by: STUDENT IN AN ORGANIZED HEALTH CARE EDUCATION/TRAINING PROGRAM

## 2025-06-15 PROCEDURE — 84100 ASSAY OF PHOSPHORUS: CPT | Mod: NTX

## 2025-06-15 PROCEDURE — 80195 ASSAY OF SIROLIMUS: CPT | Mod: NTX | Performed by: STUDENT IN AN ORGANIZED HEALTH CARE EDUCATION/TRAINING PROGRAM

## 2025-06-15 PROCEDURE — 83735 ASSAY OF MAGNESIUM: CPT | Mod: NTX

## 2025-06-15 PROCEDURE — 36415 COLL VENOUS BLD VENIPUNCTURE: CPT | Mod: NTX

## 2025-06-15 RX ORDER — ENOXAPARIN SODIUM 100 MG/ML
40 INJECTION SUBCUTANEOUS EVERY 24 HOURS
Status: DISCONTINUED | OUTPATIENT
Start: 2025-06-15 | End: 2025-06-16 | Stop reason: HOSPADM

## 2025-06-15 RX ORDER — FUROSEMIDE 10 MG/ML
40 INJECTION INTRAMUSCULAR; INTRAVENOUS ONCE
Status: COMPLETED | OUTPATIENT
Start: 2025-06-15 | End: 2025-06-15

## 2025-06-15 RX ORDER — POTASSIUM CHLORIDE 20 MEQ/1
40 TABLET, EXTENDED RELEASE ORAL ONCE
Status: COMPLETED | OUTPATIENT
Start: 2025-06-15 | End: 2025-06-15

## 2025-06-15 RX ADMIN — ENOXAPARIN SODIUM 40 MG: 40 INJECTION SUBCUTANEOUS at 04:06

## 2025-06-15 RX ADMIN — OXYCODONE HYDROCHLORIDE 30 MG: 10 TABLET ORAL at 08:06

## 2025-06-15 RX ADMIN — OXYCODONE HYDROCHLORIDE 30 MG: 10 TABLET ORAL at 02:06

## 2025-06-15 RX ADMIN — DIAZEPAM 5 MG: 5 TABLET ORAL at 09:06

## 2025-06-15 RX ADMIN — SIROLIMUS 1 MG: 1 TABLET ORAL at 08:06

## 2025-06-15 RX ADMIN — POTASSIUM CHLORIDE 40 MEQ: 1500 TABLET, EXTENDED RELEASE ORAL at 10:06

## 2025-06-15 RX ADMIN — TENOFOVIR ALAFENAMIDE 25 MG: 25 TABLET ORAL at 08:06

## 2025-06-15 RX ADMIN — FUROSEMIDE 40 MG: 10 INJECTION, SOLUTION INTRAMUSCULAR; INTRAVENOUS at 04:06

## 2025-06-15 NOTE — ASSESSMENT & PLAN NOTE
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 Follows with Dr. Morgan. Hx of anastomotic stricture requiring dilatation and stenting. Had been having uptrending bilirubin and MRCP ordered outpatient, done at Osh as outpatient. Admitted to Osh with Tbili of 8, ascites, transudative pleural effusion.   -positive for hep B core antibody, surface antigen.  Negative for hep B surface antibody.  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  No longer on mycophenolate  - valaclovir PPx continued

## 2025-06-15 NOTE — PROGRESS NOTES
[facility] Progress Note      Attending: Heladio Corbin MD   Admit Date: 6/6/2025  Today's Date: 06/15/2025    SUBJECTIVE:     Interval History:   No complaints this morning.     Scheduled Medications:    diazePAM  5 mg Oral QHS    enoxparin  40 mg Subcutaneous Daily    sirolimus  1 mg Oral Daily    tenofovir alafenamide  25 mg Oral Daily       PRN Medications:     Current Facility-Administered Medications:     acetaminophen, 650 mg, Oral, Q8H PRN    albuterol-ipratropium, 3 mL, Nebulization, Q4H PRN    aluminum-magnesium hydroxide-simethicone, 30 mL, Oral, QID PRN    bisacodyL, 10 mg, Rectal, Daily PRN    dextrose 50%, 12.5 g, Intravenous, PRN    dextrose 50%, 25 g, Intravenous, PRN    glucagon (human recombinant), 1 mg, Intramuscular, PRN    glucose, 16 g, Oral, PRN    glucose, 24 g, Oral, PRN    melatonin, 6 mg, Oral, Nightly PRN    naloxone, 0.02 mg, Intravenous, PRN    ondansetron, 4 mg, Intravenous, Q8H PRN    oxyCODONE, 30 mg, Oral, Q6H PRN    prochlorperazine, 5 mg, Intravenous, Q6H PRN    senna-docusate, 1 tablet, Oral, BID PRN    OBJECTIVE:     Vital Signs Trends/Hx Reviewed  Vitals:    06/15/25 0211 06/15/25 0551 06/15/25 0809 06/15/25 0810   BP:  (!) 116/58 (!) 100/46    BP Location:  Left arm Left arm    Patient Position:  Lying Lying    Pulse:  94 89    Resp: 18 18 18 18   Temp:  99 °F (37.2 °C) 98.5 °F (36.9 °C)    TempSrc:  Oral Oral    SpO2:  (!) 94% 95%    Weight:       Height:             Physical Exam  Vitals reviewed.   Constitutional:       General: He is not in acute distress.     Appearance: He is not diaphoretic.   Eyes:      General: Scleral icterus present.   Cardiovascular:      Rate and Rhythm: Normal rate.   Pulmonary:      Effort: Pulmonary effort is normal.   Abdominal:      General: There is no distension.      Tenderness: There is no abdominal tenderness.   Musculoskeletal:      Right lower leg: Edema present.      Left lower leg: Edema present.   Neurological:       Mental Status: He is alert.          Laboratory:  Lab results in last 24 hours reviewed.     MELD 3.0: 23 at 6/15/2025  5:21 AM  MELD-Na: 20 at 6/15/2025  5:21 AM  Calculated from:  Serum Creatinine: 1.6 mg/dL at 6/15/2025  5:21 AM  Serum Sodium: 137 mmol/L at 6/15/2025  5:21 AM  Total Bilirubin: 6.1 mg/dL at 6/15/2025  5:21 AM  Serum Albumin: 1.9 g/dL at 6/15/2025  5:21 AM  INR(ratio): 1.2 at 6/15/2025  5:21 AM  Age at listing (hypothetical): 34 years  Sex: Male at 6/15/2025  5:21 AM      ASSESSMENT & RECOMMENDATIONS   34M with alcoholic hepatitis s/p liver transplant 10/19/2017 on Sirolimus 1 mg daily with post transplant course complicated by alcohol relapse 2019 (most recent PETH 9/2024 <10), anastomotic stricture requiring stenting, LAURA previously on HD followed by Dr. Morgan, umbilical hernia repair 11/2022, open L inguinal hernia repair 1/2025 and R inguinal hernia repair 4/2025, persistently elevated LFTs since most recent hernia repair who initially presented to OSH with SOB and jaundice, found to have a large R pleural effusion s/p thoracentesis, elevated liver enzymes and LAURA, ultimately sent to [facility] on 6/06 for higher level of care. Hepatology consulted for IS management and hyperbilirubinemia. We recommended liver bx which was completed on 6/8, HVPG 11 concerning for portal hypertension. He spiked a fever on 6/8 to 101F, started on broad spectrum abx. He underwent repeat thoracentesis with Pulm on 6/9, 1.5 L removed, exudative based on light's criteria. Pulmonology thinks the effusion could be related to use of Sirolimus as there has been some data with development of effusions. PETH <10. He is s/p TJ liver bx, which showed minimal non-specific portal inflammation with stage 2 fibrosis, negative for ACR. He was found to have an acute Hep B infection (HbSAg+, HbCIgM+, HbSAb non-reactive), started on Vemlidy on 6/14.      Problem List:  Alcoholic hepatitis s/p liver transplant on IS  Elevated liver  enzymes  Stage 2 fibrosis on liver bx 2024, now with imaging concerning for cirrhosis  Alcohol use disorder (in remission)  LAURA  Pleural effusion s/p thoracentesisx2  LE edema  Hepatitis B infection      Recommendations:  -Liver transplant evaluation opened.   ->Triple phase CT completed; follow up results.  ->Addiction psych and txp ID consulted completed.    ->Palliative consult pending.  ->Given low MELD, plan to complete the rest of his evaluation as an outpatient.  -Continue Vemlidy 25 mg daily for acute Hep B. Likely acquired during sexual intercourse.  ->Follow up HBV DNA, Hep B e Ag/Ab pending.   -Pt states he already had an MRCP previously which did not show biliary obstruction. Will attempt to request those records.   -Continue Sirolimus 1mg daily with daily AM Sirolimus levels.    Thank you for allowing us to participate in the care of this patient. Please call with questions.     Roya Lizarraga MD  Gastroenterology Fellow, PGY-V  [facility]

## 2025-06-15 NOTE — ASSESSMENT & PLAN NOTE
Likely 2/2 decreased oncotic pressure due to hyopalbuminemia, inflammation/infection  Was previously on Lasix, metolazone, spironolactone.  Held in the setting of hypotension

## 2025-06-15 NOTE — SUBJECTIVE & OBJECTIVE
Interval History:  Patient currently most bothered by lower extremity edema.  Attempting 1 time dose of Lasix.  Unable to administer full diuretic regimen given hypotension and fluctuating renal function.  Will continue to monitor volume status.    Review of Systems  Objective:     Vital Signs (Most Recent):  Temp: 99.2 °F (37.3 °C) (06/15/25 1231)  Pulse: 85 (06/15/25 1231)  Resp: 18 (06/15/25 1412)  BP: (!) 99/55 (06/15/25 1231)  SpO2: (!) 94 % (06/15/25 1231) Vital Signs (24h Range):  Temp:  [98.5 °F (36.9 °C)-99.2 °F (37.3 °C)] 99.2 °F (37.3 °C)  Pulse:  [82-94] 85  Resp:  [18] 18  SpO2:  [93 %-97 %] 94 %  BP: ()/(46-58) 99/55     Weight: 67.7 kg (149 lb 2.3 oz)  Body mass index is 24.07 kg/m².    Intake/Output Summary (Last 24 hours) at 6/15/2025 1442  Last data filed at 6/15/2025 0553  Gross per 24 hour   Intake 700 ml   Output 1000 ml   Net -300 ml      Physical Exam  Vitals and nursing note reviewed.   Constitutional:       General: He is not in acute distress.     Appearance: He is ill-appearing.   HENT:      Head: Normocephalic.   Eyes:      General: Scleral icterus present.   Cardiovascular:      Rate and Rhythm: Normal rate and regular rhythm.      Heart sounds: Normal heart sounds. No murmur heard.  Pulmonary:      Breath sounds: No decreased breath sounds, wheezing, rhonchi or rales.   Abdominal:      General: There is no distension.      Palpations: Abdomen is soft.      Tenderness: There is no abdominal tenderness.   Musculoskeletal:      Right lower leg: Edema present.      Left lower leg: Edema present.   Skin:     Coloration: Skin is jaundiced.   Neurological:      General: No focal deficit present.      Mental Status: He is alert and oriented to person, place, and time.   Psychiatric:         Mood and Affect: Mood normal.         Behavior: Behavior normal.         MELD 3.0: 23 at 6/15/2025  5:21 AM  MELD-Na: 20 at 6/15/2025  5:21 AM  Calculated from:  Serum Creatinine: 1.6 mg/dL at  6/15/2025  5:21 AM  Serum Sodium: 137 mmol/L at 6/15/2025  5:21 AM  Total Bilirubin: 6.1 mg/dL at 6/15/2025  5:21 AM  Serum Albumin: 1.9 g/dL at 6/15/2025  5:21 AM  INR(ratio): 1.2 at 6/15/2025  5:21 AM  Age at listing (hypothetical): 34 years  Sex: Male at 6/15/2025  5:21 AM      Significant Labs:  CBC:  Recent Labs   Lab 06/14/25  0556 06/15/25  0521   WBC 4.84 4.17   HGB 7.9* 7.7*   HCT 24.6* 23.3*   PLT 97* 89*     CMP:  Recent Labs   Lab 06/14/25  0556 06/15/25  0521    137   K 3.4* 3.5    103   CO2 27 25   GLU 87 98   BUN 22* 23*   CREATININE 1.4 1.6*   CALCIUM 7.7* 7.9*   PROT 5.8* 5.6*   ALBUMIN 1.8* 1.9*   BILITOT 5.9* 6.1*   ALKPHOS 138 130   AST 59* 59*   ALT 23 19   ANIONGAP 10 9     PTINR:  Recent Labs   Lab 06/14/25  0556 06/15/25  0521   INR 1.2 1.2       Significant Procedures:   Dobutamine Stress Test with Color Flow: No results found. However, due to the size of the patient record, not all encounters were searched. Please check Results Review for a complete set of results.    thoracentesis

## 2025-06-15 NOTE — CHAPLAIN
Patient: Jhonny Diana  MRN: [id number]  : 1990  Age: 34 y.o.  Legal sex: male   Hospital Length of Stay: 9 days  Code Status: Full Code   Attending Provider: Heladio Corbin MD  Principal Problem: History of liver transplant  Patient's Uatsdin: No Uatsdin  Length of my visit: 5 min  Purpose of visit:   Shayan      attempted to visit - patient asleep.         Rev. Jesus Smith, [facility], [facility], Phone [phone]  Advanced Practice Board-Certified  in Hospice and Palliative Care, [place]+     support is available and on-site . Please call the on-call  for any emergent spiritual care needs @ [phone]

## 2025-06-15 NOTE — ASSESSMENT & PLAN NOTE
Patient found to have large pleural effusion on imaging on admission.      -Presented with new onset SOB at OSH. Found to have Large right pleural effusion causing compressive atelectasis of most of the right lung on 6/2 CXR. Underwent thora  -Right side thoracentesis on 6/3 with removal of 1000 cc of reddish-brown otherwise nonturbid fluid.  Cultures were negative  -Previous studies consistent with transudative effusion. Likely from ascites  -6/9: Exudative by Light's criteria due to protein content. Possibly confounded due to diuretic use  -per pulmonology, may be non chylous pleural effusion 2/2 sirolimus given effusion coinciding with pt restarting the med in April   -thoracentesis performed 06/09, 1.5 L removed.  Cytology negative for malignant cells.  Cultures remained negative.  - repeat chest x-ray from 6/13 shows persistent moderate partly loculated right pleural effusion  -providing intermittent diuresis

## 2025-06-15 NOTE — PROGRESS NOTES
[facility] - Transplant [facility] Medicine  Progress Note    Patient Name: Jhonny Diana  MRN: [id number]  Patient Class: IP- Inpatient   Admission Date: 6/6/2025  Length of Stay: 9 days  Attending Physician: Heladio Corbin MD  Primary Care Provider: Elo Bull MD        Subjective     Principal Problem:History of liver transplant        HPI:   Per  note:    Mr. 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 His post-transplant period was notable for LAURA previously on HD, anastomotic stricture requiring dilatation and stenting, and ETOH relapse (6/2019). He has followed with Dr. Julio in Hepatology, though his last clinic visit was 5/30/23. He is now followed by Dr. Morgan in Transplant Clinic. Per Dr. Michel at [facility], he is only on Sirolimus 1mg daily (of note, this was held a short time after his hernia repair).    He was recently admitted to [facility] in [place] on 6/2 with SOB, jaundice, and anasarca. Since admit, he has been taking his home Sirolimis.    He was ultimately found to have a large right pleural effusion. He had a right US guided thoracentesis on 6/3 with removal of 1000 cc of reddish-brown otherwise nonturbid fluid. Thora fluid studies: Gluc 91, , pH 8, Prot 3.5,  with 15% PMN, RBC 20,6000. He also had ascites, Abdominal U/S: Only trace fluid right upper quadrant insufficient for ultrasound-guided drainage.     He was also found to be in LAURA with Cr of 1.98, which then went to 2.02, and has now improved to 1.8 again with IV diuresis (Lasix + Metolazone). Nephrology there was consulted. At admit: WBC 7.1, Hg 10.6, EVL056, AST 46, ALT 28, TB 8.2, NH3 57, , INR 1.3. TB today has improved to 5.6.    Yesterday, unexpectedly his  blood cultures 2/2 turned positive for GPC. He was started on Vancomycin and Rocephin 2g iv daily. They do not have ID service there and have not done TTE or BRADEN. Given elevated TB and bacteremia, GI there recommended transfer to center with Hepatology to help manage him with history of XPL.    Current VS 6/4 at 4pm: T 98.0F, P 80, RR 80, /55.    CXR 6/2: Large right pleural effusion causing compressive atelectasis of most of the right lung. Left lung is clear.The trachea appears severely narrowed and the right mainstem bronchus is not well seen, raising the possibility of a mediastinal mass.     Overview/Hospital Course:  Mr. Diana is a 34-year-old gentleman with history of liver transplant in 2017 following alcoholic hepatitis.  Transplant complicated by anastomotic stricture that required stent as well as treatment of acute rejection in 2024 requiring steroids.  He is admitted with decompensated cirrhosis involving volume overload.  Decompensated cirrhosis seems to be related to new hepatitis-B infection.  Found to be positive for hepatitis-B core antibody and surface antigen.  Negative for surface antibody, indicating insufficient immunity.  PCR and HBV e antigen/antibody pending.  Patient believes he may have been infected by a previous sexual partner.  Patient had large right-sided pleural effusion here of uncertain etiology.  Treated with thoracentesis on 06/03 with clinical improvement.  Continues to have partly loculated right pleural fluid collection on chest x-ray 6/13.  Patient treated for sepsis with vancomycin and Zosyn, but antibiotics discontinued on 06/11 following negative cultures.  6/8 liver biopsy pathology shows significant fibrosis without active steatohepatitis.   Hepatitis-B serologies were discovered on 06/14 as part of evaluation for re-transplantation.  Patient started on tenofovir.    Interval History:  Patient currently most bothered by lower extremity edema.  Attempting 1 time  dose of Lasix.  Unable to administer full diuretic regimen given hypotension and fluctuating renal function.  Will continue to monitor volume status.    Review of Systems  Objective:     Vital Signs (Most Recent):  Temp: 99.2 °F (37.3 °C) (06/15/25 1231)  Pulse: 85 (06/15/25 1231)  Resp: 18 (06/15/25 1412)  BP: (!) 99/55 (06/15/25 1231)  SpO2: (!) 94 % (06/15/25 1231) Vital Signs (24h Range):  Temp:  [98.5 °F (36.9 °C)-99.2 °F (37.3 °C)] 99.2 °F (37.3 °C)  Pulse:  [82-94] 85  Resp:  [18] 18  SpO2:  [93 %-97 %] 94 %  BP: ()/(46-58) 99/55     Weight: 67.7 kg (149 lb 2.3 oz)  Body mass index is 24.07 kg/m².    Intake/Output Summary (Last 24 hours) at 6/15/2025 1442  Last data filed at 6/15/2025 0553  Gross per 24 hour   Intake 700 ml   Output 1000 ml   Net -300 ml      Physical Exam  Vitals and nursing note reviewed.   Constitutional:       General: He is not in acute distress.     Appearance: He is ill-appearing.   HENT:      Head: Normocephalic.   Eyes:      General: Scleral icterus present.   Cardiovascular:      Rate and Rhythm: Normal rate and regular rhythm.      Heart sounds: Normal heart sounds. No murmur heard.  Pulmonary:      Breath sounds: No decreased breath sounds, wheezing, rhonchi or rales.   Abdominal:      General: There is no distension.      Palpations: Abdomen is soft.      Tenderness: There is no abdominal tenderness.   Musculoskeletal:      Right lower leg: Edema present.      Left lower leg: Edema present.   Skin:     Coloration: Skin is jaundiced.   Neurological:      General: No focal deficit present.      Mental Status: He is alert and oriented to person, place, and time.   Psychiatric:         Mood and Affect: Mood normal.         Behavior: Behavior normal.         MELD 3.0: 23 at 6/15/2025  5:21 AM  MELD-Na: 20 at 6/15/2025  5:21 AM  Calculated from:  Serum Creatinine: 1.6 mg/dL at 6/15/2025  5:21 AM  Serum Sodium: 137 mmol/L at 6/15/2025  5:21 AM  Total Bilirubin: 6.1 mg/dL at  6/15/2025  5:21 AM  Serum Albumin: 1.9 g/dL at 6/15/2025  5:21 AM  INR(ratio): 1.2 at 6/15/2025  5:21 AM  Age at listing (hypothetical): 34 years  Sex: Male at 6/15/2025  5:21 AM      Significant Labs:  CBC:  Recent Labs   Lab 06/14/25  0556 06/15/25  0521   WBC 4.84 4.17   HGB 7.9* 7.7*   HCT 24.6* 23.3*   PLT 97* 89*     CMP:  Recent Labs   Lab 06/14/25  0556 06/15/25  0521    137   K 3.4* 3.5    103   CO2 27 25   GLU 87 98   BUN 22* 23*   CREATININE 1.4 1.6*   CALCIUM 7.7* 7.9*   PROT 5.8* 5.6*   ALBUMIN 1.8* 1.9*   BILITOT 5.9* 6.1*   ALKPHOS 138 130   AST 59* 59*   ALT 23 19   ANIONGAP 10 9     PTINR:  Recent Labs   Lab 06/14/25  0556 06/15/25  0521   INR 1.2 1.2       Significant Procedures:   Dobutamine Stress Test with Color Flow: No results found. However, due to the size of the patient record, not all encounters were searched. Please check Results Review for a complete set of results.    thoracentesis      Assessment & Plan  History of liver transplant  Hyperbilirubinemia  Immunosuppression  Acute hepatitis B  Liver tx in 2017 2/2 Etoh cirrhosis. Follows with Dr. Morgan. Hx of anastomotic stricture requiring dilatation and stenting. Had been having uptrending bilirubin and MRCP ordered outpatient, done at [facility] as [facility]. Admitted to [facility] with Tbili of 8, ascites, transudative pleural effusion.   -positive for hep B core antibody, surface antigen.  Negative for hep B surface antibody.  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  No longer on mycophenolate  - valaclovir PPx continued     Pleural effusion    Patient found to have large pleural effusion on imaging on admission.      -Presented with new onset SOB at [facility]. Found to have Large right pleural effusion  causing compressive atelectasis of most of the right lung on 6/2 CXR. Underwent thora  -Right side thoracentesis on 6/3 with removal of 1000 cc of reddish-brown otherwise nonturbid fluid.  Cultures were negative  -Previous studies consistent with transudative effusion. Likely from ascites  -6/9: Exudative by Light's criteria due to protein content. Possibly confounded due to diuretic use  -per pulmonology, may be non chylous pleural effusion 2/2 sirolimus given effusion coinciding with pt restarting the med in April   -thoracentesis performed 06/09, 1.5 L removed.  Cytology negative for malignant cells.  Cultures remained negative.  - repeat chest x-ray from 6/13 shows persistent moderate partly loculated right pleural effusion  -providing intermittent diuresis  Ascites  Likely 2/2 decreased oncotic pressure due to hyopalbuminemia, inflammation/infection  Was previously on Lasix, metolazone, spironolactone.  Held in the setting of hypotension    Opioid dependence  Chronic pain  Follows with pain specialist Takes 30mg oxycodone QID   - continue oxycodone 30mg q6h PRN to avoid withdrawals while inpatient     CKD stage 3a, GFR 45-59 ml/min  Creatine stable for now. BMP reviewed- noted Estimated Creatinine Clearance: 58.2 mL/min (A) (based on SCr of 1.6 mg/dL (H)). according to latest data. Based on current GFR, CKD stage is stage 2 - GFR 60-89.  Monitor UOP and serial BMP and adjust therapy as needed. Renally dose meds. Avoid nephrotoxic medications and procedures.  VTE Risk Mitigation (From admission, onward)           Ordered     enoxaparin injection 40 mg  Daily         06/15/25 0921     IP VTE HIGH RISK PATIENT  Once         06/15/25 0921     Place sequential compression device  Until discontinued         06/06/25 1604                    Discharge Planning   DERICK: 6/16/2025     Code Status: Full Code   Medical Readiness for Discharge Date: 6/14/2025  Discharge Plan A: Home with family                        Heladio KELSEY  MD [place]  Department of Hospital Medicine   [facility] - Transplant Stepdown

## 2025-06-15 NOTE — PLAN OF CARE
AAOx4. VSS. C/o generalized pain. Oxy 30mg PRN x2 given. Pt ambulating around room independently. Mother @ bedside, attentive to patient. Bed in low position. Side rails raisedx2, wheels locked. Nonskid shoes when OOB. Call bell and personal items within reach.

## 2025-06-16 ENCOUNTER — TELEPHONE (OUTPATIENT)
Dept: INFECTIOUS DISEASES | Facility: CLINIC | Age: 35
End: 2025-06-16
Payer: MEDICARE

## 2025-06-16 ENCOUNTER — TELEPHONE (OUTPATIENT)
Dept: TRANSPLANT | Facility: CLINIC | Age: 35
End: 2025-06-16
Payer: MEDICARE

## 2025-06-16 VITALS
WEIGHT: 146.25 LBS | OXYGEN SATURATION: 96 % | SYSTOLIC BLOOD PRESSURE: 105 MMHG | RESPIRATION RATE: 15 BRPM | DIASTOLIC BLOOD PRESSURE: 55 MMHG | BODY MASS INDEX: 23.5 KG/M2 | TEMPERATURE: 99 F | HEART RATE: 83 BPM | HEIGHT: 66 IN

## 2025-06-16 LAB
ABSOLUTE EOSINOPHIL (OHS): 0.42 K/UL
ABSOLUTE MONOCYTE (OHS): 0.53 K/UL (ref 0.3–1)
ABSOLUTE NEUTROPHIL COUNT (OHS): 1.64 K/UL (ref 1.8–7.7)
ALBUMIN SERPL BCP-MCNC: 2 G/DL (ref 3.5–5.2)
ALP SERPL-CCNC: 152 UNIT/L (ref 40–150)
ALT SERPL W/O P-5'-P-CCNC: 24 UNIT/L (ref 10–44)
ANION GAP (OHS): 10 MMOL/L (ref 8–16)
AST SERPL-CCNC: 66 UNIT/L (ref 11–45)
BASOPHILS # BLD AUTO: 0.04 K/UL
BASOPHILS NFR BLD AUTO: 1 %
BILIRUB DIRECT SERPL-MCNC: 4 MG/DL (ref 0.1–0.3)
BILIRUB SERPL-MCNC: 6.1 MG/DL (ref 0.1–1)
BUN SERPL-MCNC: 22 MG/DL (ref 6–20)
CALCIUM SERPL-MCNC: 7.9 MG/DL (ref 8.7–10.5)
CHLORIDE SERPL-SCNC: 102 MMOL/L (ref 95–110)
CO2 SERPL-SCNC: 25 MMOL/L (ref 23–29)
CREAT SERPL-MCNC: 1.7 MG/DL (ref 0.5–1.4)
EBV VCA IGG SER QL IA: POSITIVE
ERYTHROCYTE [DISTWIDTH] IN BLOOD BY AUTOMATED COUNT: 14.1 % (ref 11.5–14.5)
FUNGUS SPEC CULT: NORMAL
GFR SERPLBLD CREATININE-BSD FMLA CKD-EPI: 53 ML/MIN/1.73/M2
GLUCOSE SERPL-MCNC: 101 MG/DL (ref 70–110)
HBV DNA SERPL NAA+PROBE-ACNC: ABNORMAL IU/ML
HBV DNA SERPL NAA+PROBE-ACNC: ABNORMAL [IU]/ML
HBV LOG 10 (OHS): >9 LOG IU/ML
HCT VFR BLD AUTO: 25.6 % (ref 40–54)
HGB BLD-MCNC: 8.4 GM/DL (ref 14–18)
IMM GRANULOCYTES # BLD AUTO: 0.07 K/UL (ref 0–0.04)
IMM GRANULOCYTES NFR BLD AUTO: 1.7 % (ref 0–0.5)
INR PPP: 1.2 (ref 0.8–1.2)
LYMPHOCYTES # BLD AUTO: 1.32 K/UL (ref 1–4.8)
MAGNESIUM SERPL-MCNC: 1.8 MG/DL (ref 1.6–2.6)
MCH RBC QN AUTO: 32.8 PG (ref 27–31)
MCHC RBC AUTO-ENTMCNC: 32.8 G/DL (ref 32–36)
MCV RBC AUTO: 100 FL (ref 82–98)
NUCLEATED RBC (/100WBC) (OHS): 0 /100 WBC
PHOSPHATE SERPL-MCNC: 2.9 MG/DL (ref 2.7–4.5)
PLATELET # BLD AUTO: 102 K/UL (ref 150–450)
PMV BLD AUTO: 9.4 FL (ref 9.2–12.9)
POTASSIUM SERPL-SCNC: 3.3 MMOL/L (ref 3.5–5.1)
PROT SERPL-MCNC: 6.1 GM/DL (ref 6–8.4)
PROTHROMBIN TIME: 12.8 SECONDS (ref 9–12.5)
RBC # BLD AUTO: 2.56 M/UL (ref 4.6–6.2)
RELATIVE EOSINOPHIL (OHS): 10.4 %
RELATIVE LYMPHOCYTE (OHS): 32.8 % (ref 18–48)
RELATIVE MONOCYTE (OHS): 13.2 % (ref 4–15)
RELATIVE NEUTROPHIL (OHS): 40.9 % (ref 38–73)
SIROLIMUS BLD-MCNC: 6.2 NG/ML (ref 4–20)
SODIUM SERPL-SCNC: 137 MMOL/L (ref 136–145)
V.ZOSTER IGG INTERP (OHS): POSITIVE
VARICELLA ZOSTER IGG (OHS): 7.52 S/CO
WBC # BLD AUTO: 4.02 K/UL (ref 3.9–12.7)

## 2025-06-16 PROCEDURE — 80195 ASSAY OF SIROLIMUS: CPT | Mod: NTX | Performed by: STUDENT IN AN ORGANIZED HEALTH CARE EDUCATION/TRAINING PROGRAM

## 2025-06-16 PROCEDURE — 25000003 PHARM REV CODE 250: Mod: NTX | Performed by: STUDENT IN AN ORGANIZED HEALTH CARE EDUCATION/TRAINING PROGRAM

## 2025-06-16 PROCEDURE — 36415 COLL VENOUS BLD VENIPUNCTURE: CPT | Mod: NTX | Performed by: INTERNAL MEDICINE

## 2025-06-16 PROCEDURE — 84100 ASSAY OF PHOSPHORUS: CPT | Mod: NTX

## 2025-06-16 PROCEDURE — 25000003 PHARM REV CODE 250: Mod: NTX

## 2025-06-16 PROCEDURE — 82248 BILIRUBIN DIRECT: CPT | Mod: NTX | Performed by: INTERNAL MEDICINE

## 2025-06-16 PROCEDURE — 83735 ASSAY OF MAGNESIUM: CPT | Mod: NTX

## 2025-06-16 PROCEDURE — 86480 TB TEST CELL IMMUN MEASURE: CPT | Mod: NTX | Performed by: STUDENT IN AN ORGANIZED HEALTH CARE EDUCATION/TRAINING PROGRAM

## 2025-06-16 PROCEDURE — 63600175 PHARM REV CODE 636 W HCPCS: Mod: NTX | Performed by: STUDENT IN AN ORGANIZED HEALTH CARE EDUCATION/TRAINING PROGRAM

## 2025-06-16 PROCEDURE — 85610 PROTHROMBIN TIME: CPT | Mod: NTX

## 2025-06-16 PROCEDURE — 85025 COMPLETE CBC W/AUTO DIFF WBC: CPT | Mod: NTX

## 2025-06-16 PROCEDURE — 36415 COLL VENOUS BLD VENIPUNCTURE: CPT | Mod: NTX | Performed by: STUDENT IN AN ORGANIZED HEALTH CARE EDUCATION/TRAINING PROGRAM

## 2025-06-16 PROCEDURE — 80053 COMPREHEN METABOLIC PANEL: CPT | Mod: NTX

## 2025-06-16 PROCEDURE — 63600175 PHARM REV CODE 636 W HCPCS: Mod: NTX

## 2025-06-16 RX ORDER — POTASSIUM CHLORIDE 20 MEQ/1
40 TABLET, EXTENDED RELEASE ORAL EVERY 4 HOURS
Status: COMPLETED | OUTPATIENT
Start: 2025-06-16 | End: 2025-06-16

## 2025-06-16 RX ORDER — MAGNESIUM SULFATE HEPTAHYDRATE 40 MG/ML
2 INJECTION, SOLUTION INTRAVENOUS ONCE
Status: COMPLETED | OUTPATIENT
Start: 2025-06-16 | End: 2025-06-16

## 2025-06-16 RX ORDER — FUROSEMIDE 20 MG/1
20 TABLET ORAL DAILY
Status: DISCONTINUED | OUTPATIENT
Start: 2025-06-16 | End: 2025-06-16 | Stop reason: HOSPADM

## 2025-06-16 RX ORDER — TIZANIDINE 4 MG/1
4 TABLET ORAL NIGHTLY PRN
Start: 2025-06-16 | End: 2025-06-26

## 2025-06-16 RX ORDER — FUROSEMIDE 20 MG/1
20 TABLET ORAL DAILY
Qty: 30 TABLET | Refills: 11 | Status: ACTIVE | OUTPATIENT
Start: 2025-06-17 | End: 2026-06-17

## 2025-06-16 RX ORDER — SODIUM,POTASSIUM PHOSPHATES 280-250MG
1 POWDER IN PACKET (EA) ORAL ONCE
Status: COMPLETED | OUTPATIENT
Start: 2025-06-16 | End: 2025-06-16

## 2025-06-16 RX ADMIN — FUROSEMIDE 20 MG: 20 TABLET ORAL at 08:06

## 2025-06-16 RX ADMIN — OXYCODONE HYDROCHLORIDE 30 MG: 10 TABLET ORAL at 08:06

## 2025-06-16 RX ADMIN — POTASSIUM CHLORIDE 40 MEQ: 1500 TABLET, EXTENDED RELEASE ORAL at 12:06

## 2025-06-16 RX ADMIN — POTASSIUM CHLORIDE 40 MEQ: 1500 TABLET, EXTENDED RELEASE ORAL at 08:06

## 2025-06-16 RX ADMIN — OXYCODONE HYDROCHLORIDE 30 MG: 10 TABLET ORAL at 02:06

## 2025-06-16 RX ADMIN — MAGNESIUM SULFATE HEPTAHYDRATE 2 G: 40 INJECTION, SOLUTION INTRAVENOUS at 08:06

## 2025-06-16 RX ADMIN — SIROLIMUS 1 MG: 1 TABLET ORAL at 08:06

## 2025-06-16 RX ADMIN — TENOFOVIR ALAFENAMIDE 25 MG: 25 TABLET ORAL at 08:06

## 2025-06-16 RX ADMIN — POTASSIUM & SODIUM PHOSPHATES POWDER PACK 280-160-250 MG 1 PACKET: 280-160-250 PACK at 08:06

## 2025-06-16 NOTE — PLAN OF CARE
[facility] - Transplant Stepdown  Discharge Final Note    Primary Care Provider: Elo Bull MD    Expected Discharge Date: 6/16/2025    Patient discharged to home via Mother's transportation.     Patient's bedside nurse provided discharge instructions.    Discharge Plan A and Plan B have been determined by review of patient's clinical status, future medical and therapeutic needs, and coverage/benefits for post-acute care in coordination with multidisciplinary team members.        Final Discharge Note (most recent)       Final Note - 06/13/25 0855          Final Note    Assessment Type Discharge Planning Reassessment                     Important Message from Medicare  Important Message from Medicare regarding Discharge Appeal Rights: Given to patient/caregiver, Explained to patient/caregiver, Signed/date by patient/caregiver     Date IMM was signed: 06/16/25  Time IMM was signed: 1057    Contact Info       Laura Morgan MD   Specialty: Transplant, Hepatology, Gastroenterology    [facility] [address]   Phone: [phone]       Next Steps: Schedule an appointment as soon as possible for a visit in 1 month(s)    Elo Bull MD   Specialty: Family Medicine   Relationship: PCP - General    [facility]  [id number]  [facility] [address]   Phone: [phone]       Next Steps: Follow up on 6/27/2025    Instructions: Hospital follow up established patient @ 9:00 am. Please brind discharge summary, ID, insurance card, and medication list.            Future Appointments   Date Time Provider Department Center   7/17/2025 10:00 AM Adriana Sanchez DO [facility] ID [address]       SW scheduled post-discharge follow-up appointment and information added to AVS.      Rebecca Carter, RN, BSN  Case Management  [phone]

## 2025-06-16 NOTE — TELEPHONE ENCOUNTER
----- Message from Adriana Sanchez DO sent at 6/14/2025  3:13 PM CDT -----  1 month follow up - virtual please

## 2025-06-16 NOTE — TELEPHONE ENCOUNTER
Spoke to patient and informed him that Jie Roca RN will be his outpatient pre-coordinator for his current evaluation and that he will do Fast Pass evaluation for liver re-transplantation as an outpatient on July 10-11. Further advised that Melony Fine RN would remain his post transplant coordinator for the remainder of time that he has his 1st liver.The patient verbalized understanding and was given time for questions and answers.

## 2025-06-16 NOTE — PLAN OF CARE
SIRIAW scheduled established patient hospital follow up with PCP on 6/27/25 @ 9:00 am.     SIRIAW spoke with Dr Laura Morgan nursing staff to schedule patient 1 month follow up appointment.

## 2025-06-16 NOTE — PROGRESS NOTES
AVS reviewed with pt at bedside. Questions encouraged and answered accordingly. Pt verbalized understanding of AVS. Vitals obtained and reviewed. PIV removed. Pt departed the unit via wheelchair being pushed by his mother with their belongings. No distress noted on departure.

## 2025-06-16 NOTE — PLAN OF CARE
AAOx4, afebrile, c/o generalized body pain. PRN oxy given. +3 BLE edema noted. Workup for evaluation of re transplant is in process. Mother at the bedside. Pt able to position self independently. Pt in lowest position, side rails up x2, non-skid foot wear in place, call light within reach, pt verbalized understanding to call RN when needed. Hand hygiene practiced per protocol. Will continue to monitor.

## 2025-06-16 NOTE — TELEPHONE ENCOUNTER
Good morning!    This patient was approved for inpatient evaluation on 6/13.  He will be discharging today and will need outpatient evaluation for re-transplant.  His diagnosis is decompensated cirrhosis of his transplanted liver, ICD 10 codes K74.61 and T86.42 with a MELD of 23.  Jie Roca RN will be his outpatient coordinator.  Thank you,  Mi

## 2025-06-16 NOTE — PLAN OF CARE
-AAOx4, VSS, afebrile, on room air  -PRN pain meds given, see MAR  -No complaints at this time   -No falls reported this shift  -Bed low and locked with call light in reach  -Mom at the bedside and attentive to pt    Problem: Fall Injury Risk  Goal: Absence of Fall and Fall-Related Injury  Outcome: Progressing     Problem: Liver Failure  Goal: Optimal Coping with Liver Failure  Outcome: Progressing  Goal: Fluid and Electrolyte Balance  Outcome: Progressing     Problem: Pain Acute  Goal: Optimal Pain Control and Function  Outcome: Progressing     Problem: Coping Ineffective  Goal: Effective Coping  Outcome: Progressing

## 2025-06-16 NOTE — PROGRESS NOTES
Pt called RN to room at 0900 due to IV burning from Mg. NS @ 25cc y-ported to mag for dilution, mg rate decreased to 18cc/hr. Heat pack applied to piv site. POC ongoing.

## 2025-06-16 NOTE — PROGRESS NOTES
"Transplant Recipient Adult Psychosocial Assessment- INPATIENT     Patient's mother/ primary caregiver, Sanjuanita Diana, was present for this assessment, per patient choice.     Jhonny Diana  [address] [place] [address]  Telephone Information:   Mobile [phone]   Home  [phone] (home)  Work  There is no work phone number on file.  E-mail  [email or web address]    Sex: male  YOB: 1990  Age: 34 y.o.    Encounter Date: 6/4/2025  U[place] Citizen: yes  Primary Language: English   Needed: no    Emergency Contact:  Name: Sanjuanita Diana  Relationship: mother  Address: same as patient  Phone Numbers: work: [phone]; cell: [phone]    Family/Social Support:   Number of dependents/: Patient has 1 cat; patient confirmed having others who will help care for his cat if patient is transplanted.   Marital history: Single; never .   Other family dynamics: Patient receives strong support from his mother, Sanjuanita, whom he lives with. Patient's father passed away years ago; he had an extensive history of alcoholism. Patient reported not being close with his father before his father's passing due to his father's use of alcohol. Patient has 5 siblings and reported being close with most of them (closer with some than others). Patient specifically identified his younger siblings, Miranda and Devante as strong supports, both of whom live in [place]. He is also close with his sister Wilma. She lives in [place] but patient reported she is "reliable and supportive." Patient has several friends who he still talks to and feels he can trust, one being his old roommate, Kaila Schwarz.    Household Composition:  Name: Kadeem Lebron  Age: 33yo  Relationship: patient  Does person drive? yes- patient can drive, but rarely does so, particularly if he is taking prescribed pain medications    Name: Sanjuanita Lebron  Age: 60yo  Relationship: mother  Does person drive? yes    Do you and your caregivers " have access to reliable transportation? yes  PRIMARY CAREGIVER: Sanjuanita Diana, patient's mother, will be primary caregiver, phone number: work- [phone]; cell- [phone].     provided in-depth information to patient and caregiver regarding pre- and post-transplant caregiver role.   strongly encourages patient and caregiver to have concrete plan regarding post-transplant care giving, including back-up caregiver(s) to ensure care giving needs are met as needed.    Patient and Caregiver states understanding all aspects of caregiver role/commitment and is able/willing/committed to being caregiver to the fullest extent necessary.    Patient and Caregiver verbalizes understanding of the education provided today and caregiver responsibilities.         remains available. Patient and Caregiver agree to contact  in a timely manner if concerns arise.      Able to take time off work without financial concerns: yes.     Additional Significant Others who will Assist with Transplant:  Name: Miranda Trotter ([phone])- per patient consent, SW called and confirmed Miranda is able to be back-up caregiver. Education provided.   Age: 29yo  City: [place]: [place]  Relationship: sister  Does person drive? yes    Name: Devante Diana ([phone])  Age: 28yo  City[place]  Relationship: brother  Does person drive? yes    Name: Jonny Trotter  Age: 29yo  City[place]  Relationship: brother-in-law  Does person drive? yes    Living Will: yes  Healthcare Power of : yes  Advance Directives on file: <Received per medical record.  Verbally reviewed LW/HCPA information.   provided patient with copy of LW/HCPA documents and provided education on completion of forms.    Living Donors: No. Education and resource information given to patient.    Highest Education Level: Attended College/Technical School  Reading Ability:  college  Reports difficulty with: comprehension and memory- only in the context of HE. Patient denied any current difficulty with comprehension, memory, reading, writing, seeing, hearing or learning.   Learns Best By:  multi method     Status: no  VA Benefits: no     Working for Income: No  If no, reason not working: Disability  Patient is disabled.  Prior to disability, patient  was working at a [employer]. He tried to continue working post transplant, but due to the ongoing challenges with his liver which impacted his work performance, he ultimately quit and went on disability. Per chart review, prior to transplant, patient was working full time at [employer] as a .     Spouse/Significant Other Employment: NA- patient is single     Disabled: yes: date disability began: , due to: ongoing liver issues.    Monthly Income:   Disability: $1,548/ month  Able to afford all costs now and if transplanted, including medications: yes- patient reported his mother helps with covering most of the household bills and patient is responsible for all of his own bills. He reported money is tight month to month.     Patient and Caregiver verbalizes understanding of personal responsibilities related to transplant costs and the importance of having a financial plan to ensure that patients transplant costs are fully covered.       provided fundraising information/education. Patient and Caregiververbalizes understanding.   remains available.    Insurance:   Payer/Plan Subscr  Sex Relation Sub. Ins. ID Effective Group Num   1. [employer] MANAGED* RAFFY THOMAS* 1990 Male  [id number] Not Eff                                    [address] [address]   2. [facility] access hospital* MARTHARAFFY GARCÍA* 1990 Male Self [id number]                                   [address]     Primary Insurance (for UNOS reporting): Public Insurance - Medicare FFS (Fee For Service)  Secondary Insurance (for  UNOS reporting): Public Insurance - Medicare FFS (Fee For Service)  Patient and Caregiver verbalizes clear understanding that patient may experience difficulty obtaining and/or be denied insurance coverage post-surgery. This includes and is not limited to disability insurance, life insurance, health insurance, burial insurance, long term care insurance, and other insurances.      Patient and Caregiver also reports understanding that future health concerns related to or unrelated to transplantation may not be covered by patient's insurance.  Resources and information provided and reviewed.     Patient and Caregiver provides verbal permission to release any necessary information to outside resources for patient care and discharge planning.  Resources and information provided are reviewed.      Dialysis Adherence: Patient and Caregiver report a history of dialysis briefly before his 1st transplant and for about 8 months post 1st transplant. Patient reported being told his kidney functioning improved and he no longer needed dialysis. When patient was receiving dialysis, [facility]   Infusion Service: patient utilizing? no  Home Health: patient utilizing? yes- patient received wound care and PT via [facility] in the months post 1st transplant.   DME: yes- patient has access to a cane, walker, wheelchair, commode.  Pulmonary/Cardiac Rehab: None   ADLS:  Patient reported being independent with all ADLs prior to this hospitalization. Per patient, he can drive; however, rarely does as it stresses him out. He is also prescribed pain medications and does not drive if he has taken these.     Adherence: Patient reported high adherence with all medical appointments and medications. Adherence education and counseling provided.     Per History Section:  Past Medical History:   Diagnosis Date    Abdominal pain 05/27/2018    Alcoholic cirrhosis 08/14/2018    Alcoholic hepatitis with ascites      "Anemia     CKD (chronic kidney disease) stage 3, GFR 30-59 ml/min 2018    Encounter for blood transfusion     multiple blood transfusions    ESRD on dialysis     Herpes zoster without complication 2019    History of hematemesis 2017    Hypertension     Seizures     Substance abuse     alcohol and opioids    Thrombocytopenia     Transplanted liver     10/2017     Social History     Tobacco Use    Smoking status: Former     Current packs/day: 0.00     Average packs/day: 1 pack/day for 10.0 years (10.0 ttl pk-yrs)     Types: Cigarettes     Start date: 2007     Quit date: 2017     Years since quittin.3     Passive exposure: Current    Smokeless tobacco: Never    Tobacco comments:     Medical Marijuana smoker.    Substance Use Topics    Alcohol use: No     Comment: a fifth of liquor daily for years, cut back over last 2 months     Social History     Substance and Sexual Activity   Drug Use No     Social History     Substance and Sexual Activity   Sexual Activity Not Currently    Partners: Male    Comment: single       Per Today's Psychosocial:    Tobacco: Patient reported smoking 2PPD during college, then decreased to about 1PPD. Patient smoked this amount until , which is when he quit. He has not relapsed and denied any cravings to smoke.    Alcohol: Patient has a history of alcohol use, which resulted in him requiring a liver transplant 10/19/2017. Per chart review and patient, patient had stopped drinking about 2 months before his liver transplant evaluation in . Prior to that, patient was consuming at least 1 fifth of whiskey daily from 5890-7897. Per chart review, "in  he started an attempt to stop drinking by tapering down his alcohol intake. Pt reported he tapered down to 4-5 "stiff" drinks of whiskey per day. Pt reported by the beginning of  he had tapered down to drinking one glass of wine daily with intention to stop completely. Pt reported he knew his drinking " "was problematic but was unable to stop completely due to experiencing significant symptoms of withdrawal including headaches, tremors, and feeling physically ill. Pt reported he was able to successfully stop drinking in the beginning of August 2017 and was able to stay sober on his own for one month. In September 2017 he was hospitalized twice due to sxs of acute liver failure. Pt reported he knew he was sick but was unaware that he was in liver failure until his first hospitalization in September 2017 at [facility] in [place]. Pt reported his alcohol intake increased in 2014 when he moved to [place], [place] and got a job at a [employer]. Pt reported it was stressful and he felt that he was conning people, which he stated he felt guilty about. Pt identified this job as his main trigger for his heavy alcohol use. Pt reported prior to that he drank about 3-4 days per week where he was drinking 3-4 drinks on days that he drank. Pt stated that in college (0108-2419) he was drinking only when he went to parties, which was a few times per month."      On this day, reported no relapse on ETOH since 2017. SW asked patient about a positive PETH test (6/21/19- 156). Patient could not recall any period post transplant where he consumed any ETOH. Notably, patient and his mother re[ported that the patient was not residing with his mother at that time; his mother emphasized that no one consumes ETOH in her home, which is where the patient currently lives. Patient reported no difficulty with remaining sober saying he never has cravings nor is he ever triggered to drink. Patient reported completing [facility] in the months after his first transplant and participated in in person AA meetings for some time in [place]; however, did not feel a connection with the local meetings near his home and thus no longer participates. Patient also has tried virtual meetings, but he finds the participants in virtual meetings are " "less committed to sobriety and gives patient a negative feeling. Because of this he does not participate in virtual meetings. Patient interested in receiving 1:1 therapy so he can not only talk about his sobriety, but receive support in other areas of his life. Patient reported a plan to search his insurance company's natasha to identify in network providers. SW encouraged this.    Illicit Drugs/Non-prescribed Medications: Patient denied any history of substance use. He shared he is prescribed medical marijuana and takes gummies occasionally to help with nausea/ vomiting. He finds this effective. Patient notably tested positive for benzos, opiates, and THC in September 2024 when he was medically admitted. Patient shared he was/ is prescribed both benzos (valium) and opiates by his pain management doctor and he takes these as prescribed.Of note, per chart review, patient previously reported taking Lortab that was not prescribed to him ~2011. He received this from an acquaintance and estimated he took about 90 tablets; this amount lasted him a few weeks. Patient had reported he felt he was becoming addicted so he moved to [place] for a few weeks to get away from that crowd. He reported never relapsing on this.     Patient and Caregiver states clear understanding of the potential impact of substance use as it relates to transplant candidacy and is aware of possible random substance screening.  Substance abstinence/cessation counseling, education and resources provided and reviewed.     Arrests/DWI/Treatment/Rehab: patient denies any legal issues. Per chart review, patient previously reported having a DUI at 14yo, which was ultimately expunged. Patient reportedly completed [facility] post 1st transplant and has attended both in person and virtual AA meetings. See above under "alcohol" for more details. Patient is not currently participating in any AA meetings, but reported being interested in receiving 1:1 therapy so he " "can not only talk about his sobriety, but receive support in other areas of his life. Patient reported a plan to search his insurance company's natasha to identify in network providers.     Psychiatric History:    Mental Health: Patient reported a long history of social anxiety. He denied ever experiencing any depression, though acknowledged feeling "sadness" at different times in his life due to his circumstances. Patient reported his anxiety is fairly well managed with Valium, which is currently prescribed by his pain management doctor. He also reported developing a number of coping skills over the years, which he finds helpful in managing his anxiety.   Psychiatrist/Counselor: Patient reported meeting with a number of different psychiatrists over the years, the first being pre college. He reported mostly having negative experiences (e.g. lack of professionalism, out of pocket costs, etc). Patient also reported meeting with counselors over the years, which he found somewhat helpful. Patient reported being interested in re-connecting with a therapist to not only receive support with his recovery, but also in different areas of his life. Patient knows how to access a list of in network providers and plans to search.   Medications:  Patient reported having a long history of trying various SSRI's and SNRI's. He has never found them helpful (except Wellbutrin as this was helpful with reducing tobacco cravings) and reported consistently experiencing "depersonalization" when taking them. Patient is currently prescribed Valium for his anxiety, which is prescribed by his pain management doctor.   Suicide/Homicide Issues: Patient denied ever experiencing any suicidal or homicidal thoughts.    Safety at home: Patient reported feeling safe at home.    Knowledge: Patient and Caregiver states having clear understanding and realistic expectations regarding the potential risks and potential benefits of organ transplantation and organ " donation and agrees to discuss with health care team members and support system members, as well as to utilize available resources and express questions and/or concerns in order to further facilitate the pt informed decision-making.  Resources and information provided and reviewed.    Patient and Caregiver is aware of [facility] affiliation and/or partnership with agencies in home health care, LTAC, SNF, [facility], and other hospitals and clinics.    Understanding: Patient and Caregiver reports having a clear understanding of the many lifetime commitments involved with being a transplant recipient, including costs, compliance, medications, lab work, procedures, appointments, concrete and financial planning, preparedness, timely and appropriate communication of concerns, abstinence (ETOH, tobacco, illicit non-prescribed drugs), adherence to all health care team recommendations, support system and caregiver involvement, appropriate and timely resource utilization and follow-through, mental health counseling as needed/recommended, and patient and caregiver responsibilities.  Social Service Handbook, resources and detailed educational information provided and reviewed.  Educational information provided.    Patient and Caregiver also reports current and expected compliance with health care regime and states having a clear understanding of the importance of compliance.      Patient and Caregiver reports a clear understanding that risks and benefits may be involved with organ transplantation and with organ donation.       Patient and Caregiver also reports clear understanding that psychosocial risk factors may affect patient, and include but are not limited to feelings of depression, generalized anxiety, anxiety regarding dependence on others, post traumatic stress disorder, feelings of guilt and other emotional and/or mental concerns, and/or exacerbation of existing mental health concerns.  Detailed resources provided and  "discussed.      Patient and Caregiver agrees to access appropriate resources in a timely manner as needed and/or as recommended, and to communicate concerns appropriately.  Patient and Caregiver also reports a clear understanding of treatment options available.     Patient and Caregiver received education in a group setting.   reviewed education, provided additional information, and answered questions.    Feelings or Concerns: Patient reported feeling concerned about "will this transplant be effective? Will it be worth it? Will I be in the same amount of pain as last time?" Patient reported having a challenging recovery after his first transplant and worries this recovery post 2nd transplant could be similar.     Coping: Identify Patient & Caregiver Strategies to [place]:   1. In the past, coping with major surgery and/or related stress - family support, positive self-talk, deep breathing   2. 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 At the time of surgery - family support    4. 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    Interview Behavior: Patient and Caregiver presents as pleasant, calm, communicative, cooperative, and asking and answering questions appropriately.      Transplant Social Work - Candidacy  Assessment/Plan:     Psychosocial Suitability: Patient presents as a moderate to low risk candidate for transplant at this time. Based on psychosocial risk factors, patient presents as moderate to low risk due to history of ETOH use.    Patient has a confirmed caregiver plan and excellent supports. He has no recent history of tobacco use (quit 2017) and no significant history of substance use. Patient has a history of acute alcoholic hepatitis, resulting in patient needing a liver transplant. Per patient, he has been sober since his transplant in 2017. Patient briefly " relapsed in June 2019 as evidenced by a positive PETH; however, patient does not recall consuming any ETOH and could not provide details on this relapse. Otherwise, patient's PETHs have all been negative and he reportedly participated in IOP post initial transplant. Patient has access to resources. Patient has no acute mental health concerns at this time.     Final determination of transplant candidacy will be made once evaluation is complete and reviewed by the Liver Transplant Selection Committee.    Recommendations/Additional Comments:  -Continue to abstain from alcohol use and reach out to SW if additional resources are necessary for abstinence.    -Recommend periodic random alcohol testing (e.g. serum alcohol, urine alcohol, PETH) moving forward to gauge and monitor sobriety.  -Connect with therapist for support re: his mental health and support with his sobriety  -Continue maintenance of symptoms for anxiety  -Update advance directives  -Explore fund raising  -Identify local lodging  -Notify SW of any changes in caregiver plan.  -Patient to call transplant SW with any concerns or obstacles  -Plan was created in collaboration with patient and patient/caregiver verbalize agreement with plan as discussed.      Frances Potts, ASIM

## 2025-06-17 LAB
CMV IGG SERPL QL IA: REACTIVE
CYSTATIN C SERPL-MCNC: 2.36 MG/L (ref 0.63–1.03)
GFR/BSA.PRED SERPLBLD CYS-BASED-ARV: 27 ML/MIN/BSA
MITOGEN MINUS NIL (OHS): 7.23
NIL TB SYNCED (OHS): 0.03
QUANTIFERON GOLD INTERP (OHS): NEGATIVE
TB1 AG MINUS NIL (OHS): 0
TB2 AG MINUS NIL (OHS): <0

## 2025-06-18 ENCOUNTER — PATIENT MESSAGE (OUTPATIENT)
Dept: TRANSPLANT | Facility: CLINIC | Age: 35
End: 2025-06-18
Payer: MEDICARE

## 2025-06-18 DIAGNOSIS — B16.9 ACUTE VIRAL HEPATITIS B WITHOUT COMA AND WITHOUT DELTA AGENT: ICD-10-CM

## 2025-06-18 LAB
STRONGYLOIDES IGG SER QL IA: NEGATIVE
W HEPATITIS BE AB: NONREACTIVE
W HEPATITIS BE AG: REACTIVE

## 2025-06-18 NOTE — TELEPHONE ENCOUNTER
Patient request for new prescription of Vemlidy to be sent to his pharmacy as he was discharge with only one month of medication he will need for the rest of his life.

## 2025-06-19 ENCOUNTER — TELEPHONE (OUTPATIENT)
Dept: TRANSPLANT | Facility: CLINIC | Age: 35
End: 2025-06-19
Payer: MEDICARE

## 2025-06-20 ENCOUNTER — TELEPHONE (OUTPATIENT)
Dept: TRANSPLANT | Facility: CLINIC | Age: 35
End: 2025-06-20
Payer: MEDICARE

## 2025-06-20 NOTE — LETTER
"   LAB ORDERS    2025      ORDERING MD:   Laura Morgan MD  NPI [id number]        Patient Name: Jhonny Diana               : 1990    [facility] Number: [id number]                         Please draw the following labs:     Test Frequency  Diagnosis/ICD-10 Code     CBC/DIFF/PLT Week of 2025  Z94.4 Liver Transplant       Complete Metabolic Panel Week of 2025  Z94.4 Liver Transplant         Sirolimus Level Week of 2025  Z94.4 Liver Transplant     PT/INR Week of 2025  Z94.4 Liver Transplant     Hep B DNA Quantitative PCR Week of 2025  B16.9 Acute Hepatitis B         FAX RESULTS [phone] "[facility] Liver Transplant Coordinator", and send the hard copy when completed. If you have any questions regarding this request or need additional information, please call [phone].      "

## 2025-06-20 NOTE — TELEPHONE ENCOUNTER
Communication sent to patient through patient portal regarding labs needed.     Mr. Diana  Can we get some labs on you this week coming?  I will send lab orders to [facility] for a new Meld on you along with regular labs and we will also check a Hepatitis B DNA Quantitative level.  Thanks  Melony

## 2025-06-24 ENCOUNTER — RESULTS FOLLOW-UP (OUTPATIENT)
Dept: HEPATOLOGY | Facility: CLINIC | Age: 35
End: 2025-06-24

## 2025-06-24 LAB
EXT ALBUMIN: 2.9
EXT ALKALINE PHOSPHATASE: 154
EXT ALT: 23
EXT AST: 79
EXT BASOPHIL%: 0.3
EXT BILIRUBIN DIRECT: 3.5 MG/DL
EXT BILIRUBIN TOTAL: 6.7
EXT BUN: 21
EXT CALCIUM: 8.7
EXT CHLORIDE: 101
EXT CO2: 25
EXT CREATININE: 1.66 MG/DL
EXT EGFR NO RACE VARIABLE: 55
EXT EOSINOPHIL%: 7.1
EXT GGT: 88
EXT GLUCOSE: 82
EXT HEMATOCRIT: 30.2
EXT HEMOGLOBIN: 10.1
EXT LYMPH%: 33.4
EXT MONOCYTES%: 11.9
EXT PLATELETS: 101
EXT POTASSIUM: 3.6
EXT PROTEIN TOTAL: 6.5
EXT SEGS%: 47.2
EXT SIROLIMUS LVL: NORMAL
EXT SODIUM: 135 MMOL/L
EXT WBC: 3.8
HBV DNA SERPL NAA+PROBE-LOG IU: 7.31 LOGIU/ML
HEPATITIS B VIRUS LOG (IU/ML): NORMAL
INR PPP: 1.3
PROTHROMBIN TIME: 15.4 SECONDS
SIROLIMUS LEVEL BLOOD: 9.1

## 2025-06-26 ENCOUNTER — TELEPHONE (OUTPATIENT)
Dept: TRANSPLANT | Facility: CLINIC | Age: 35
End: 2025-06-26
Payer: MEDICARE

## 2025-06-26 DIAGNOSIS — Z94.4 HISTORY OF LIVER TRANSPLANT: ICD-10-CM

## 2025-06-26 DIAGNOSIS — Z76.82 ORGAN TRANSPLANT CANDIDATE: ICD-10-CM

## 2025-06-26 DIAGNOSIS — E87.20 METABOLIC ACIDOSIS: ICD-10-CM

## 2025-06-26 DIAGNOSIS — Z79.899 OTHER LONG TERM (CURRENT) DRUG THERAPY: ICD-10-CM

## 2025-06-26 DIAGNOSIS — Z76.82 ORGAN TRANSPLANT CANDIDATE: Primary | ICD-10-CM

## 2025-06-26 DIAGNOSIS — R53.83 OTHER FATIGUE: ICD-10-CM

## 2025-06-26 DIAGNOSIS — B16.9 ACUTE HEPATITIS B: ICD-10-CM

## 2025-06-26 DIAGNOSIS — B16.9 ACUTE VIRAL HEPATITIS B WITHOUT COMA AND WITHOUT DELTA AGENT: ICD-10-CM

## 2025-06-26 DIAGNOSIS — Z29.89 PROPHYLACTIC IMMUNOTHERAPY: Primary | Chronic | ICD-10-CM

## 2025-06-26 DIAGNOSIS — Z79.60 LONG-TERM USE OF IMMUNOSUPPRESSANT MEDICATION: Chronic | ICD-10-CM

## 2025-06-26 DIAGNOSIS — N18.31 CKD STAGE 3A, GFR 45-59 ML/MIN: ICD-10-CM

## 2025-06-26 NOTE — TELEPHONE ENCOUNTER
Called pt to inform him that financial clearance has been obtained from the insurance company to initiate outpatient liver transplant evaluation.  Spoke w/ his mom, Sanjuanita.  Informed her that evaluation will take approx 1-2 days to complete.  Informed her that all testing will be done outpatient.  She voiced understanding of the need for patient to have his caregiver present for the  and surgeon consult, as well as for the patient education, but reports that many tests and consults were complete while inpatient.  Epic reviewed.  Informed her of all pending test/ consults.  All questions/ concerns regarding liver transplant evaluation answered/ addressed.  She voiced understanding.   Orders for liver transplant evaluation entered and submitted to  for scheduling.  Will schedule appts as previously discussed on 7/10.  During call, also discussed f/u w/ PCP.  She states that at the time of hospital discharge, f/u appt was scheduled w/ PCP but patient no longer sees that provider.  Informed Sanjuanita, that patient should f/u w/ current PCP for routine care per their recommendation.  No need to keep appt w/ previous PCP.  She voiced understanding and gratitude.

## 2025-06-28 NOTE — ASSESSMENT & PLAN NOTE
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 He was admitted for peritonitis from 11/24-11/30 for peritonitis.     Plan:   - LAURA on CKD 4 (baseline sCr 3.9 - 4.0). Likely instrinsic etiology, multifactorial secondary to sepsis vs. Toxins  - Increase sodium biacarbonate 1,300 mg q TID for acidosis. Please.   - Need ABG. Ordered this am.   - Strict inatake and output,  overnight since admission.  - Will have paracentesis and ERCP plan for today  - Please give albumin with paracenthesis  - Avoid nephrotoxic medication, renal dose medications and avoid contrast   - Renal diet  - No need for RRT   I have discussed with the Attending the patient's status, as well as the H&P and the fetal status. The Attending agreed with the suggested management.

## 2025-07-02 ENCOUNTER — TELEPHONE (OUTPATIENT)
Dept: TRANSPLANT | Facility: CLINIC | Age: 35
End: 2025-07-02
Payer: MEDICARE

## 2025-07-02 NOTE — TELEPHONE ENCOUNTER
New Lab orders faxed   Messaged in patient portal:      Jhonny Felix  I can only apologize for your inconvenience.  I fell short.  I signed off early due to being ill and missed doing this task.  Please note in your my chart natasha.  There are new orders that state they are to be done every 7 days.  They were faxed to the new number.  Thank you for your patience.   Melony Ty,   We went this morning to get labs, but there are no orders at [facility].  They did give us an additional fax # [phone].  Let me know when the orders are there.  We will plan to do them Friday morning.  Thanks, Elvira

## 2025-07-02 NOTE — LETTER
"   LAB ORDERS    2025      ORDERING MD:   Laura Morgan MD  NPI [id number]        Patient Name: Jhonny Diana               : 1990    [facility] Number: [id number]                         Please draw the following labs:     Test Frequency  Diagnosis/ICD-10 Code     CBC/DIFF/PLT every 7 days  Z94.4 Liver Transplant       Complete Metabolic Panel every 7 days  Z94.4 Liver Transplant         Sirolimus Level  every 7 days  Z94.4 Liver Transplant     PT/INR every 7 days  Z76.82  Liver Transplant Candidate     Hep B DNA Quant Every 7 days   B18.1  Hepatitis B          FAX RESULTS [phone] "Critical access hospital Liver Transplant Coordinator", and send the hard copy when completed. If you have any questions regarding this request or need additional information, please call [phone].      "
lab results/need to admit/radiology results

## 2025-07-03 ENCOUNTER — RESULTS FOLLOW-UP (OUTPATIENT)
Dept: TRANSPLANT | Facility: HOSPITAL | Age: 35
End: 2025-07-03
Payer: MEDICARE

## 2025-07-03 LAB
EXT ALBUMIN: 2.7
EXT ALKALINE PHOSPHATASE: 145
EXT ALT: 20
EXT AST: 68
EXT BASOPHIL%: 0.5
EXT BILIRUBIN TOTAL: 6.7
EXT BUN: 17
EXT CALCIUM: 8.9
EXT CHLORIDE: 98
EXT CO2: 26
EXT CREATININE: 1.76 MG/DL
EXT EGFR NO RACE VARIABLE: 51
EXT EOSINOPHIL%: 5.3
EXT GLUCOSE: 107
EXT HBV DNA QUANT PCR: NORMAL
EXT HEMATOCRIT: 26.8
EXT HEMOGLOBIN: 9
EXT INR: 1.4
EXT LYMPH%: 30.2
EXT MONOCYTES%: 12.1
EXT PLATELETS: 63
EXT POTASSIUM: 3
EXT PROTEIN TOTAL: 6.4
EXT PT: 15.7
EXT SEGS%: 52
EXT SIROLIMUS LVL: NORMAL
EXT SODIUM: 131 MMOL/L
EXT WBC: 3.8

## 2025-07-07 NOTE — PROGRESS NOTES
[facility]  Liver Transplant  Progress Note    Patient Name: Jhonny Diana  MRN: [id number]  Admission Date: 2018  Hospital Length of Stay: 40 days  Code Status: Full Code  Primary Care Provider: Primary Doctor No  Post-Operative Day: 124    ORGAN:   LIVER  Disease Etiology: Acute Alcoholic Hepatitis  Donor Type:    - Brain Death  CDC High Risk:   No  Donor CMV Status:   Donor CMV Status: Positive  Donor HBcAB:   Negative  Donor HCV Status:   Negative  Whole or Partial: Whole Liver  Biliary Anastomosis: End to End  Arterial Anatomy: Standard  Subjective:     History of Present Illness:  Jhonny Diana is a 28 y/o male with past medical history of alcoholic cirrhosis.  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 Found to have peritonitis in November (WBC 5000, 75% PNM) neg for bile leak. He was treated initially with vanc/cefepime. Repeat cell counts  with some improvement (WBC 1400, 45% PNM). He has undergone multiple paracenteses as well as abscess drainage of perihepatic fluid collections and treated with antimicrobial therapy but no positive cultures. Of note, biliary stricture also identified and ERCP performed on 2017 with sphincterotomy and biliary stent placed. Liver tests still have not normalized despite intervention, bilirubin and AP remain elevated. Other pertinent PMH current wound vac in place 2/2 wound infection, malnutrition requiring TPN for short course and ongoing hypoalbuminemia, and seizure activity while on prograf and has since been switched to cyclosporine without reoccurrence.  He presented to the ER for fever, abdominal pain, and N/V. He reports fever (103) for 1  day prior. Overnight, he developed N/V, reports small amount of green emesis with new left sided pain. He also endorses worsening SOB with exertion. He was scheduled as an outpatient for follow up paracentesis and IR drainage of fluid collection. CXR in ER shows large pleural effusion with subsegmental atelectasis. Infectious work up initiated in ER. His ANC is 900. Broad spectrum antibiotics initiated in ED. He denies chest pain, palpitations, diarrhea, constipation, or back pain. Denies any sick contacts.    Hospital Course:  Thoracentesis (1.2L off), Paracentesis (1.6L off), and IR drainage of fluid collection 1/11, all fluids negative for infection. ID consulted. Broad spectrum antibiotics d/c'd 1/15. Chronically malnourished with poor PO intake, prealbumin 7. Shane tube placed 1/15 for tube feedings.     ERCP 1/17 with sludge and a biliary stone which was removed and stent exchanged.  Remained with n/v; therefore, TF remained on hold and TPN continued.   Pt with fever s/p ERCP on 1/17 which continued until 1/19.  Vanc/cefepime restarted.  Blood cx 1/17 and 1/18 NGTD.  ID reconsulted.  Fungal markers sent.  CT C/A/P obtained.  With large R pleural effusion and ascites- both drained 1/19 and negative for infx per cell count. Pt afebrile 1/20. Liver biopsy 1/23 - without rejection.  EGD 1/25 - unremarkable for source of GI symptoms.  Resume diet along with other GI recommendations. SHANE tube placed for tube feeds.       Interval History: No acute events overnight. Patient feeling okay this morning, up sitting on couch. Does have some nausea this AM. Will check amylase/lipase. ERCP yesterday 2/19, overall unremarkable findings. Did place new larger stents. Will need repeat ERCP in 10 weeks. Bilirubin is improving though post ERCP, 1.8 on AM labs. Tmax 100.4. ID following. Plan to continue IV antibiotics for total of 2 weeks. Cont to monitor.     Scheduled Meds:   atovaquone  1,500 mg Oral Daily    cycloSPORINE  175  Detail Level: Generalized mg Oral BID    docusate sodium  100 mg Oral BID    epoetin maurisio (PROCRIT) injection  10,000 Units Intravenous Every Mon, Wed, Fri    heparin (porcine)  5,000 Units Subcutaneous Q8H    custom IVPB builder   Intravenous Q24H    levetiracetam oral soln  500 mg Oral BID    levothyroxine  75 mcg Oral Before breakfast    metoclopramide HCl  5 mg Oral QID (AC & HS)    multivitamin  1 tablet Oral Daily    omeprazole  40 mg Oral QAM    piperacillin-tazobactam (ZOSYN) IVPB  4.5 g Intravenous Q12H    polyethylene glycol  17 g Oral BID    psyllium husk (aspartame)  3.4 g Oral BID    ursodiol  300 mg Oral TID    valganciclovir 50 mg/ml  100 mg Oral Every Mon, Wed, Fri     Continuous Infusions:  PRN Meds:sodium chloride, sodium chloride 0.9%, sodium chloride 0.9%, acetaminophen, albuterol-ipratropium 2.5mg-0.5mg/3mL, bisacodyl, dextrose 50%, dextrose 50%, glucagon (human recombinant), glucose, glucose, heparin (porcine), omnipaque, ondansetron, ondansetron, oxyCODONE, prochlorperazine, simethicone, sodium chloride 0.9%, sodium chloride 0.9%, sodium chloride 0.9%    Review of Systems   Constitutional: Positive for activity change, appetite change, fatigue and fever. Negative for chills.   HENT: Negative.  Negative for congestion and facial swelling.    Eyes: Negative for pain, discharge and visual disturbance.   Respiratory: Negative for cough, chest tightness, shortness of breath and wheezing.    Cardiovascular: Negative for chest pain, palpitations and leg swelling.   Gastrointestinal: Positive for abdominal distention, abdominal pain and nausea. Negative for constipation and vomiting.   Endocrine: Negative.    Genitourinary: Positive for decreased urine volume. Negative for difficulty urinating.        Anuric   Musculoskeletal: Negative for arthralgias, back pain and myalgias.   Skin: Positive for color change and wound. Negative for rash.   Allergic/Immunologic: Positive for immunocompromised state.    Neurological: Negative for dizziness, seizures, weakness and headaches.   Psychiatric/Behavioral: Positive for decreased concentration and dysphoric mood. Negative for confusion. The patient is not nervous/anxious.    All other systems reviewed and are negative.    Objective:     Vital Signs (Most Recent):  Temp: 100 °F (37.8 °C) (02/20/18 1131)  Pulse: 84 (02/20/18 1131)  Resp: 18 (02/20/18 1131)  BP: 110/65 (02/20/18 1131)  SpO2: 96 % (02/20/18 1131) Vital Signs (24h Range):  Temp:  [98.2 °F (36.8 °C)-100.4 °F (38 °C)] 100 °F (37.8 °C)  Pulse:  [73-84] 84  Resp:  [16-20] 18  SpO2:  [95 %-100 %] 96 %  BP: (110-123)/(65-77) 110/65     Weight: 62 kg (136 lb 11 oz)  Body mass index is 22.06 kg/m².    Intake/Output - Last 3 Shifts       02/18 0700 - 02/19 0659 02/19 0700 - 02/20 0659 02/20 0700 - 02/21 0659    P.O. 270  240    Other  600     NG/ 60     IV Piggyback 200      Total Intake(mL/kg) 690 (11.1) 660 (10.6) 240 (3.9)    Other  1600     Total Output   1600      Net +690 -940 +240           Urine Occurrence 0 x      Stool Occurrence 1 x            Physical Exam   Constitutional: He is oriented to person, place, and time. He appears well-developed. No distress.   Temporal and distal extremity muscle wasting   HENT:   Head: Normocephalic and atraumatic.   Mouth/Throat: No oropharyngeal exudate.   dylan tube   Eyes: EOM are normal. Pupils are equal, round, and reactive to light. Scleral icterus is present.   Neck: Normal range of motion. Neck supple. No JVD present. No thyromegaly present.   Cardiovascular: Normal rate, regular rhythm, normal heart sounds and intact distal pulses.    No murmur heard.  Pulmonary/Chest: Effort normal. No respiratory distress. He has decreased breath sounds in the right middle field, the right lower field and the left lower field. He has no wheezes. He exhibits no tenderness.   Diminished to RLL   Abdominal: Soft. Bowel sounds are normal. He exhibits ascites. He exhibits no  distension. There is tenderness. There is no rebound and no guarding.   Dressing to chevron.  Wd vac removed 1/12/18  Dependent edema present R flank; surrounding erythema   Musculoskeletal: Normal range of motion. He exhibits edema (2+ LE edema). He exhibits no tenderness.   Neurological: He is alert and oriented to person, place, and time. He has normal reflexes.   Skin: Skin is warm and dry. Capillary refill takes 2 to 3 seconds. He is not diaphoretic. No erythema.   jaundice   Psychiatric: He has a normal mood and affect. His behavior is normal. Judgment and thought content normal. His mood appears not anxious.   Depressed mood   Nursing note and vitals reviewed.      Laboratory:  Immunosuppressants         Stop Route Frequency     cycloSPORINE (NEORAL) 100 mg/mL microemulsion solution 175 mg      -- Oral 2 times daily        CBC:     Recent Labs  Lab 02/20/18  0412   WBC 2.53*   RBC 2.48*   HGB 7.4*   HCT 23.6*   *   MCV 95   MCH 29.8   MCHC 31.4*     CMP:     Recent Labs  Lab 02/20/18  0412   GLU 72   CALCIUM 8.2*   ALBUMIN 1.7*   PROT 5.0*      K 3.7   CO2 24      BUN 11   CREATININE 3.2*   ALKPHOS 94   ALT <5*   AST 20   BILITOT 1.8*     Coagulation:     Recent Labs  Lab 02/16/18  0436   INR 1.0     Labs within the past 24 hours have been reviewed.    Diagnostic Results:  None    Assessment/Plan:     Other cytomegaloviral diseases    - detected at 370 on CMV PCR 2/1.  - case discussed with ID and will hold off on treatment at this time given low WBC.   - undetected CMV PCR 2/8.  - continue Valcyte 200 mg every Mon, Wed, Fri.  -CMV PCR 2/15 negative  -Per ID, valcyte dose decreased to ppx dose on 2/18.        Fever    - Started on vanc/cefepime for ERCP. Now transitioned to vanc/zosyn.  - Blood cultures sent, prelim NGTD. Repeat blood cultures and chest xray today 2/10.  - thoracentesis 2/1 - fluid negative for infection.  - atelectasis noted on ct scan - start breathing tx & CPT.  - now  Detail Level: Detailed cmv positive - started treatment 2/5/18.  - taken to OR 2/7 for exp lap - fluid collections drained, cultures pending.  - Patient with low grade fever 2/15, 100.3.  -CT A/P 2/15 with no evidence of IA fluid infection. Did show R pleural effusion s/p thoracentesis, cell count negative for infection, cx pending.  -Repeat blood cx 2/15 prelim NGTD, CMV PCR 2/15 negative   Complaining of abdominal pain. Is tender in RUQ.  -Remains with fevers  - Bilirubin remains elevated. Concerning for cholangitis. Antibiotics broadened to Zosyn, Vanc 2/17. ID re consulted.   - ERCP  2/19, overall unremarkable findings. Did place new larger stents. Will need repeat ERCP in 10 weeks. Bilirubin is improving though post ERCP, 1.8 on AM labs.   -Tmax 100.4. ID following. Plan to continue IV antibiotics for total of 2 weeks. Cont to monitor.             Constipation    - Enema ordered 1/20 and 1/21 with BM.  - Encourage ambulation.  - decrease narcotics.  - d/c metamucil and miralax bid as makes patient nauseated.  - increase reglan qid.  - small bowel follow through with normal findings.   - patient reports bowel movement 2/13        Other ascites    - Paracentesis performed 1/11/18 with 1600 ml removed.    - fluid negative for infection.   - Repeat para 1/19 negative for infection per cell count.  - no fluid seen for paracentesis on 1/23.        Severe protein-calorie malnutrition    - Poor PO intake since transplant requiring short course of TPN during previous hospital stay.   - albumin remains decreased but appetite slowly improving per pt.   - Dietary consulted. Will need to closely monitor PO intake.   - supplements also ordered.   - SHANE with tube feeding start 1/16. Stopped 1/17/18, was not tolerating. Shane removed 1/20.  - prealbumin 7 on 1/15.  - TPN started 1/16/18.   - Pt able to eat minimally 1/21 and 1/22 which is an improvement.  - gi consulted - EGD reviewed, increased ppi bid, miralax bid, and metamucil bid, decreased  narcotics.  - continue reglan, placed SHANE tube with tube feeds - transitioned to nightly feeds. Not tolerating well due to nausea.   - small bowel follow through with normal findings.   - consulted gi for possible gj tube placement, holding off on this for now as with ongoing fevers.  - Marinol started with some improvement in appetite seen but now on hold as was not eating likely due to feeling worse with ongoing fevers.  -Dietary changed tube feeds back to 24 hours from nightly 2/19 as not eating enough during the day to meet caloric needs.           Nausea and vomiting    - h/o constipation, reports having regular BM's with bowel regimen.  - vomiting episode x 1 at home. Anti-emetics ordered PRN.  - KUB repeated 1/14/18 given increased abdominal pain- mild, generalized bowel distention suggesting ileus.  Diet changed to clears as tolerated for bowel rest .  - SHANE tube placed 1/16 for tube feeds. Not tolerating tube feeds, stopped 1/17/18.  - D/C shane 1/19, now in place as of 1/25.  - chronic constipation, Cont bowel regimen.   - GI consulted. See protein calorie malnutrition.          Recurrent pleural effusion on right    - CXR in ER with large right pleural effusion with subjective c/o worsening SOB  - thoracentesis completed- 1200 ml removed.  SOB with significant improvement.   - Cell count reviewed and negative for infection.   - resp even and non labored.  O2 sat 94-98%.  - Repeat thora 1/19 negative for infection per cell count.  - Reaccumulating fluid on xray 1/21.  - thoracentesis 1/31 - 1.4L removed, negative for infection  - Reaccumulating fluid again on xray 2/10.   -Thoracentesis performed  2/16 with 1500 cc off, cell count neg for infection, cx no growth to date.         Biliary stricture of transplanted liver    - tbili with increase prompting ERCP 1/30 with stones and sludge, stents placed.  -  Repeat ERCP 2/19 as was having recurrent fevers with bilirubin stuck at 2.0-2.2 with overall unremarkable  findings seen. Did place new larger stents. Will need repeat ERCP in 10 weeks.   -Bilirubin is improving though post ERCP, 1.8 on AM labs.        Anemia of chronic disease    - H/H stable. Cont to monitor with daily cbc.         Delayed surgical wound healing    - wd vac removed 1/12/18.  Wound healing well. Aquacel AG and Mepilex border dressing applied via wound care recs.           At risk for opportunistic infections    - CMV detected 2/1 now on tx dose valcyte. CMV PCR 2/15 not detected.   - bactrim held.            Long-term use of immunosuppressant medication    - Maintenance IS with cyclosporine. MMF on hold for infections and neutropenia. Continue to check cyclosporine level daily. Assess for toxicity and adjust level as needed.        Prophylactic immunotherapy    - See long term use of immunosuppression.         Liver transplanted    - Post op course complicated by fevers and hyperbilirubinemia.  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  - AST/ALT normal. Tbili/alkphos remain elevated.  - Liver US 1/8 showed 3.9 cm complex fluid collection anterior to right lobe and moderate nonspecific complex ascites inferior to transplant.   - IR placed drain 1/11/18, cell count negative for infection.  - PBS consulted. PBS not comfortable proceeding with EUS with liver biopsy given fever and possible infectious process going on. ERCP 1/17 with stone and sludge. Pt placed on Actigall - treated with 10 day course of abx.  ID now signed off.  - Bili elevated - liver u/s 1/28  - Liver biopsy 1/23 without rejection.  - consulted PBS for ERCP - Choledocholithiasis was found along with sludge, stents placed 1/30.  - ERCP 2/19, overall unremarkable findings. Did place new larger stents. Will need repeat ERCP in 10 weeks. Bilirubin is improving though post ERCP.        Acute renal failure with tubular necrosis    - HD resumed on previous admission. Now anuric and dialyzes M-W-F.  - Nephrology  following.    - ktm consulted for possible kidney transplant.            VTE Risk Mitigation         Ordered     heparin (porcine) injection 1,000 Units  As needed (PRN)     Route:  Intra-Catheter        02/05/18 0945     heparin (porcine) injection 5,000 Units  Every 8 hours     Route:  Subcutaneous        01/31/18 0957     Medium Risk of VTE  Once      01/11/18 0351     Place sequential compression device  Until discontinued      01/11/18 0351          The patients clinical status was discussed at multidisplinary rounds, involving transplant surgery, transplant medicine, pharmacy, nursing, nutrition, and social work    Discharge Planning: Not a candidate for discharge at this time.       Citlali Bridges PA-C  Liver Transplant  [facility]

## 2025-07-08 NOTE — ED NOTES
[facility] Surg  Infectious Disease  Consult Note    Patient Name: Hector Smith  MRN: [id number]  Admission Date: 7/5/2025  Hospital Length of Stay: 3 days  Attending Physician: Francisco Ayala,*  Primary Care Provider: Ochoa Lane MD     Isolation Status: Contact    Patient information was obtained from patient, past medical records, and ER records.      Consults  Assessment/Plan:     Renal/  * Complicated UTI (urinary tract infection)  We will continue meropenem.  Follow repeat urine culture to guide treatment..  Previous urine culture-  07/05- ESBL E coli    Oncology  Prostate cancer  Outpatient urologist/oncology follow up    Endocrine  Type 2 diabetes mellitus  Insulin regimen as per primary team        Thank you for your consult. I will follow-up with patient. Please contact us if you have any additional questions.    Haroldo Nix MD, [facility]  Infectious Disease  [facility] Med Surg    Subjective:     Principal Problem: Complicated UTI (urinary tract infection)    HPI: 65-year-old  male with past medical history notable for chronic systolic heart failure (EF 20-25%) status post AICD, type 2 diabetes mellitus, essential hypertension, carotid disease, prostate cancer (on treatment) who presents to the ED on 07/05/2025.  He was admitted for difficulty with passing urine.  There is associated history of watery diarrhea.  Since admission he was seen by Urology and a 20 Korean coude was used.  Labs and imaging  test showed UTI.  Previous urine culture 0612 ESBL E coli  Culture 0705 Gram-negative rods  .  CBC shows normal WBC        Past Medical History:   Diagnosis Date    Diabetes mellitus, type 2     Hyperlipidemia     Hypertension        Past Surgical History:   Procedure Laterality Date    SPINAL FUSION N/A 2019    TRANSURETHRAL RESECTION OF PROSTATE N/A 1/6/2025    Procedure: TURP (TRANSURETHRAL RESECTION OF PROSTATE);  Surgeon: Ron Lassiter MD;  Location: [facility] OR;  Service:  Pt reports decrease in pain level from 9/10 to 7/10 after pain medication administered.   Urology;  Laterality: N/A;       Review of patient's allergies indicates:   Allergen Reactions    Adhesive Rash     Silk Tape    Suture, silk Rash       Medications:  Medications Prior to Admission   Medication Sig    amLODIPine (NORVASC) 5 MG tablet Take 5 mg by mouth once daily.    busPIRone (BUSPAR) 5 MG Tab Take 5 mg by mouth 2 (two) times daily.    clopidogreL (PLAVIX) 75 mg tablet Take 75 mg by mouth once daily.    colchicine (COLCRYS) 0.6 mg tablet Take 0.6 mg by mouth daily as needed.    dapagliflozin propanediol (FARXIGA) 10 mg tablet Take 10 mg by mouth.    darolutamide 300 mg Tab Take 2 tablets (600 mg) by mouth 2 (two) times a day.    ENTRESTO 24-26 mg per tablet Take 1 tablet by mouth 2 (two) times daily.    furosemide (LASIX) 40 MG tablet Take 40 mg by mouth once daily.    gabapentin (NEURONTIN) 300 MG capsule     JANUMET  mg per tablet Take 1 tablet by mouth 2 (two) times daily.    ketoconazole (NIZORAL) 2 % cream Apply topically 2 (two) times daily.    sildenafiL (VIAGRA) 100 MG tablet Take 1 tablet (100 mg total) by mouth daily as needed for Erectile Dysfunction.    tamsulosin (FLOMAX) 0.4 mg Cap Take 1 capsule (0.4 mg total) by mouth 2 (two) times a day.    zolpidem (AMBIEN) 5 MG Tab Take 5 mg by mouth.     Antibiotics (From admission, onward)      Start     Stop Route Frequency Ordered    07/05/25 2100  mupirocin 2 % ointment         07/10/25 2059 Nasl 2 times daily 07/05/25 1820    07/05/25 1545  meropenem injection 1 g         -- IV Every 8 hours (non-standard times) 07/05/25 1440          Antifungals (From admission, onward)      None          Antivirals (From admission, onward)      None             There is no immunization history for the selected administration types on file for this patient.    Family History       Problem Relation (Age of Onset)    Colon cancer Mother    Throat cancer Sister          Social History     Socioeconomic History    Marital status:    Tobacco Use     Smoking status: Former     Current packs/day: 0.00     Types: Cigarettes     Quit date:      Years since quittin.5     Passive exposure: Never    Smokeless tobacco: Never   Substance and Sexual Activity    Alcohol use: Yes     Alcohol/week: 1.0 standard drink of alcohol     Types: 1 Cans of beer per week     Comment: About 1 drink a week socially    Drug use: Never    Sexual activity: Not Currently   Social History Narrative    Lives in [place] with wife, Michael. Works as a  at [employer]     Social Drivers of Health     Financial Resource Strain: Low Risk  (2025)    Overall Financial Resource Strain (CARDIA)     Difficulty of Paying Living Expenses: Not very hard   Food Insecurity: No Food Insecurity (2025)    Hunger Vital Sign     Worried About Running Out of Food in the Last Year: Never true     Ran Out of Food in the Last Year: Never true   Transportation Needs: No Transportation Needs (2025)    PRAPARE - Transportation     Lack of Transportation (Medical): No     Lack of Transportation (Non-Medical): No   Physical Activity: Insufficiently Active (2025)    Exercise Vital Sign     Days of Exercise per Week: 2 days     Minutes of Exercise per Session: 30 min   Stress: Stress Concern Present (2025)    [employer] - Occupational Stress Questionnaire     Feeling of Stress : To some extent   Housing Stability: Low Risk  (2025)    Housing Stability Vital Sign     Unable to Pay for Housing in the Last Year: No     Homeless in the Last Year: No     Review of Systems   Constitutional:  Negative for activity change, appetite change, chills, diaphoresis, fatigue and fever.   HENT:  Negative for congestion.    Neurological:  Negative for dizziness, facial asymmetry and headaches.     Objective:     Vital Signs (Most Recent):  Temp: 97.9 °F (36.6 °C) (25 001)  Pulse: 68 (25 0300)  Resp: 18 (25 001)  BP: 127/76 (25 001)  SpO2:  "97 % (07/08/25 0011) Vital Signs (24h Range):  Temp:  [97.9 °F (36.6 °C)-99.1 °F (37.3 °C)] 97.9 °F (36.6 °C)  Pulse:  [68-84] 68  Resp:  [15-20] 18  SpO2:  [94 %-97 %] 97 %  BP: (119-172)/(68-88) 127/76     Weight: 83.8 kg (184 lb 11.9 oz)  Body mass index is 33.79 kg/m².    Estimated Creatinine Clearance: 68.1 mL/min (based on SCr of 1 mg/dL).     Physical Exam  Vitals and nursing note reviewed.   HENT:      Head: Normocephalic.   Musculoskeletal:         General: Normal range of motion.   Skin:     General: Skin is warm.   Neurological:      General: No focal deficit present.      Mental Status: He is alert.   Psychiatric:         Mood and Affect: Mood normal.          Significant Labs: Blood Culture: No results for input(s): "LABBLOO" in the last 4320 hours.  BMP:   Recent Labs   Lab 07/07/25  0555         K 4.0      CO2 28   BUN 11   CREATININE 1.0   CALCIUM 8.8   MG 1.9     CBC:   Recent Labs   Lab 07/06/25  0552 07/07/25  0556   WBC 2.68* 2.58*   HGB 10.1* 10.4*   HCT 32.1* 32.7*    152     Microbiology Results (last 7 days)       Procedure Component Value Units Date/Time    Blood culture [[id number]]  (Normal) Collected: 07/05/25 1521    Order Status: Completed Specimen: Blood from Peripheral, Forearm, Left Updated: 07/08/25 0204     Blood Culture No Growth After 48 Hours    Blood culture [[id number]]  (Normal) Collected: 07/05/25 1524    Order Status: Completed Specimen: Blood from Peripheral, Hand, Right Updated: 07/08/25 0204     Blood Culture No Growth After 48 Hours    Urine culture [[id number]]  (Abnormal) Collected: 07/05/25 1343    Order Status: Completed Specimen: Urine Updated: 07/07/25 0335     Urine Culture >100,000 cfu/ml Gram-negative Rods     Comment: Identification and susceptibility pending       Clostridium difficile EIA [[id number]] Collected: 07/06/25 2054    Order Status: Canceled Specimen: Stool Updated: 07/06/25 2103            All pertinent labs within the " past 24 hours have been reviewed.    Significant Imaging: I have reviewed all pertinent imaging results/findings within the past 24 hours.

## 2025-07-09 ENCOUNTER — TELEPHONE (OUTPATIENT)
Dept: TRANSPLANT | Facility: CLINIC | Age: 35
End: 2025-07-09
Payer: MEDICARE

## 2025-07-09 NOTE — TELEPHONE ENCOUNTER
Phone call returned to patient's mother.  She reports that patient has increased swelling to abd and lower extrem, and cramping to upper and lower extrem.  She is requesting for patient to be seen in clinic on tomorrow, while on campus for [facility] evaluation.  Appt scheduled w/ Hepatologist as requested.  Discussed current diuretic regimen.  Per medication list in Epic, lasix 20 mg/ day prescribed.  Patient's mother unsure if patient is taking.  Creat 1.76, Na 131, K 3.0 on labs drawn 7/3.  Repeat labs scheduled tomorrow.  Patient/ pt's mom to bring accurate list of current medications for tomorrow's appts.  Will review w/ Hepatologist to see if any further recommendations.  Will update patient/ mom accordingly.    ====================================================      Copied from CRM #[id number]. Topic: General Inquiry - Patient Advice  >> Jul 9, 2025  8:07 AM Soniya wrote:  Consult/Advisory     Name Of Caller:  Elvira        Contact Preference:Elvira Diana (Mother)  [phone] (Home Phone) , [phone].        Nature of call:Pt mother is calling in regards to pt additional swelling around the abdomen, and swelling in the legs, severe cramping in hands and legs, suggesting he may need a paracentesis, pt is scheduled to come in on 07/10/2025 for 1:00 pm.  >> Jul 9, 2025  8:19 AM Med Assistant Lissa wrote:  Patient is post liver transplant, please send to post liver transplant clinical pool, thank you  ----- Message -----  From: Soniya Chow  Sent: 7/9/2025   8:15 AM CDT  To: Eddie Sanchez Staff

## 2025-07-10 ENCOUNTER — EVALUATION (OUTPATIENT)
Dept: TRANSPLANT | Facility: CLINIC | Age: 35
End: 2025-07-10
Payer: MEDICARE

## 2025-07-10 ENCOUNTER — CLINICAL SUPPORT (OUTPATIENT)
Dept: TRANSPLANT | Facility: CLINIC | Age: 35
End: 2025-07-10
Payer: MEDICARE

## 2025-07-10 ENCOUNTER — LAB VISIT (OUTPATIENT)
Dept: LAB | Facility: HOSPITAL | Age: 35
End: 2025-07-10
Attending: INTERNAL MEDICINE
Payer: MEDICARE

## 2025-07-10 ENCOUNTER — OFFICE VISIT (OUTPATIENT)
Dept: TRANSPLANT | Facility: CLINIC | Age: 35
End: 2025-07-10
Payer: MEDICARE

## 2025-07-10 VITALS
TEMPERATURE: 97 F | TEMPERATURE: 97 F | WEIGHT: 160.94 LBS | RESPIRATION RATE: 18 BRPM | WEIGHT: 160.94 LBS | BODY MASS INDEX: 25.86 KG/M2 | HEIGHT: 66 IN | HEIGHT: 66 IN | DIASTOLIC BLOOD PRESSURE: 53 MMHG | DIASTOLIC BLOOD PRESSURE: 53 MMHG | BODY MASS INDEX: 25.86 KG/M2 | HEART RATE: 103 BPM | BODY MASS INDEX: 25.86 KG/M2 | OXYGEN SATURATION: 96 % | TEMPERATURE: 97 F | SYSTOLIC BLOOD PRESSURE: 103 MMHG | HEART RATE: 74 BPM | RESPIRATION RATE: 18 BRPM | WEIGHT: 160.94 LBS | OXYGEN SATURATION: 96 % | SYSTOLIC BLOOD PRESSURE: 103 MMHG | HEART RATE: 74 BPM | RESPIRATION RATE: 18 BRPM | SYSTOLIC BLOOD PRESSURE: 103 MMHG | OXYGEN SATURATION: 96 % | DIASTOLIC BLOOD PRESSURE: 53 MMHG | HEIGHT: 66 IN

## 2025-07-10 DIAGNOSIS — Z76.82 ORGAN TRANSPLANT CANDIDATE: ICD-10-CM

## 2025-07-10 DIAGNOSIS — B16.9 ACUTE HEPATITIS B: ICD-10-CM

## 2025-07-10 DIAGNOSIS — Z79.60 LONG-TERM USE OF IMMUNOSUPPRESSANT MEDICATION: Chronic | ICD-10-CM

## 2025-07-10 DIAGNOSIS — T86.49 BILIARY STRICTURE OF TRANSPLANTED LIVER: ICD-10-CM

## 2025-07-10 DIAGNOSIS — K83.1 BILIARY STRICTURE OF TRANSPLANTED LIVER: ICD-10-CM

## 2025-07-10 DIAGNOSIS — Z29.89 PROPHYLACTIC IMMUNOTHERAPY: Chronic | ICD-10-CM

## 2025-07-10 DIAGNOSIS — K76.6 PORTAL HYPERTENSION: ICD-10-CM

## 2025-07-10 DIAGNOSIS — K74.01 HEPATIC FIBROSIS, EARLY FIBROSIS: ICD-10-CM

## 2025-07-10 DIAGNOSIS — Z79.899 OTHER LONG TERM (CURRENT) DRUG THERAPY: ICD-10-CM

## 2025-07-10 DIAGNOSIS — D84.9 IMMUNOSUPPRESSION: ICD-10-CM

## 2025-07-10 DIAGNOSIS — Z79.60 LONG-TERM USE OF IMMUNOSUPPRESSANT MEDICATION: ICD-10-CM

## 2025-07-10 DIAGNOSIS — N18.31 CKD STAGE 3A, GFR 45-59 ML/MIN: ICD-10-CM

## 2025-07-10 DIAGNOSIS — Z94.4 HISTORY OF LIVER TRANSPLANT: ICD-10-CM

## 2025-07-10 DIAGNOSIS — J90 PLEURAL EFFUSION: ICD-10-CM

## 2025-07-10 DIAGNOSIS — Z98.890 HISTORY OF LIVER BIOPSY: ICD-10-CM

## 2025-07-10 DIAGNOSIS — N18.31 CKD STAGE 3A, GFR 45-59 ML/MIN: Primary | ICD-10-CM

## 2025-07-10 DIAGNOSIS — E87.70 HYPERVOLEMIA, UNSPECIFIED HYPERVOLEMIA TYPE: ICD-10-CM

## 2025-07-10 DIAGNOSIS — E87.20 METABOLIC ACIDOSIS: ICD-10-CM

## 2025-07-10 DIAGNOSIS — R17 JAUNDICE: ICD-10-CM

## 2025-07-10 DIAGNOSIS — F10.91 ALCOHOL USE DISORDER IN REMISSION: ICD-10-CM

## 2025-07-10 DIAGNOSIS — R74.8 ABNORMAL LIVER ENZYMES: ICD-10-CM

## 2025-07-10 DIAGNOSIS — Z29.89 PROPHYLACTIC IMMUNOTHERAPY: ICD-10-CM

## 2025-07-10 DIAGNOSIS — R53.83 OTHER FATIGUE: ICD-10-CM

## 2025-07-10 LAB
25(OH)D3+25(OH)D2 SERPL-MCNC: 16 NG/ML (ref 30–96)
ABSOLUTE EOSINOPHIL (OHS): 0.25 K/UL
ABSOLUTE MONOCYTE (OHS): 0.5 K/UL (ref 0.3–1)
ABSOLUTE NEUTROPHIL COUNT (OHS): 2.11 K/UL (ref 1.8–7.7)
ALBUMIN SERPL BCP-MCNC: 1.9 G/DL (ref 3.5–5.2)
ALP SERPL-CCNC: 164 UNIT/L (ref 40–150)
ALT SERPL W/O P-5'-P-CCNC: 20 UNIT/L (ref 10–44)
AMPHET UR QL SCN: NEGATIVE
ANION GAP (OHS): 7 MMOL/L (ref 8–16)
AST SERPL-CCNC: 66 UNIT/L (ref 11–45)
BARBITURATE SCN PRESENT UR: NEGATIVE
BASOPHILS # BLD AUTO: 0.03 K/UL
BASOPHILS NFR BLD AUTO: 0.7 %
BENZODIAZ UR QL SCN: ABNORMAL
BILIRUB DIRECT SERPL-MCNC: 4.9 MG/DL (ref 0.1–0.3)
BILIRUB SERPL-MCNC: 6.7 MG/DL (ref 0.1–1)
BILIRUB UR QL STRIP.AUTO: ABNORMAL
BUN SERPL-MCNC: 17 MG/DL (ref 6–20)
CALCIUM SERPL-MCNC: 7.9 MG/DL (ref 8.7–10.5)
CANNABINOIDS UR QL SCN: NEGATIVE
CHLORIDE SERPL-SCNC: 100 MMOL/L (ref 95–110)
CLARITY UR: CLEAR
CO2 SERPL-SCNC: 22 MMOL/L (ref 23–29)
COCAINE UR QL SCN: NEGATIVE
COLOR UR AUTO: YELLOW
CREAT SERPL-MCNC: 1.6 MG/DL (ref 0.5–1.4)
CREAT UR-MCNC: 142 MG/DL (ref 23–375)
ERYTHROCYTE [DISTWIDTH] IN BLOOD BY AUTOMATED COUNT: 15.1 % (ref 11.5–14.5)
ETHANOL UR-MCNC: <10 MG/DL
FERRITIN SERPL-MCNC: 1266 NG/ML (ref 20–300)
GFR SERPLBLD CREATININE-BSD FMLA CKD-EPI: 57 ML/MIN/1.73/M2
GGT SERPL-CCNC: 96 U/L (ref 8–55)
GLUCOSE SERPL-MCNC: 104 MG/DL (ref 70–110)
GLUCOSE UR QL STRIP: NEGATIVE
HBV DNA SERPL NAA+PROBE-ACNC: ABNORMAL IU/ML
HBV DNA SERPL NAA+PROBE-ACNC: ABNORMAL [IU]/ML
HBV LOG 10 (OHS): 6.43 LOG IU/ML
HCT VFR BLD AUTO: 25.4 % (ref 40–54)
HGB BLD-MCNC: 8.7 GM/DL (ref 14–18)
HGB UR QL STRIP: NEGATIVE
IMM GRANULOCYTES # BLD AUTO: 0.01 K/UL (ref 0–0.04)
IMM GRANULOCYTES NFR BLD AUTO: 0.2 % (ref 0–0.5)
INR PPP: 1.3 (ref 0.8–1.2)
IRON SATN MFR SERPL: 22 % (ref 20–50)
IRON SERPL-MCNC: 50 UG/DL (ref 45–160)
KETONES UR QL STRIP: NEGATIVE
LEUKOCYTE ESTERASE UR QL STRIP: NEGATIVE
LYMPHOCYTES # BLD AUTO: 1.63 K/UL (ref 1–4.8)
MCH RBC QN AUTO: 32.7 PG (ref 27–31)
MCHC RBC AUTO-ENTMCNC: 34.3 G/DL (ref 32–36)
MCV RBC AUTO: 96 FL (ref 82–98)
METHADONE UR QL SCN: NEGATIVE
NITRITE UR QL STRIP: NEGATIVE
NUCLEATED RBC (/100WBC) (OHS): 0 /100 WBC
OPIATES UR QL SCN: ABNORMAL
PCP UR QL: NEGATIVE
PH UR STRIP: 6 [PH]
PLATELET # BLD AUTO: 66 K/UL (ref 150–450)
PMV BLD AUTO: 9.7 FL (ref 9.2–12.9)
POTASSIUM SERPL-SCNC: 3.3 MMOL/L (ref 3.5–5.1)
PROT SERPL-MCNC: 6.3 GM/DL (ref 6–8.4)
PROT UR QL STRIP: NEGATIVE
PROTHROMBIN TIME: 13.9 SECONDS (ref 9–12.5)
RBC # BLD AUTO: 2.66 M/UL (ref 4.6–6.2)
RELATIVE EOSINOPHIL (OHS): 5.5 %
RELATIVE LYMPHOCYTE (OHS): 36 % (ref 18–48)
RELATIVE MONOCYTE (OHS): 11 % (ref 4–15)
RELATIVE NEUTROPHIL (OHS): 46.6 % (ref 38–73)
SODIUM SERPL-SCNC: 129 MMOL/L (ref 136–145)
SP GR UR STRIP: 1.01
T4 FREE SERPL-MCNC: 1.03 NG/DL (ref 0.71–1.51)
TIBC SERPL-MCNC: 226 UG/DL (ref 250–450)
TRANSFERRIN SERPL-MCNC: 153 MG/DL (ref 200–375)
TSH SERPL-ACNC: 6.04 UIU/ML (ref 0.4–4)
UROBILINOGEN UR STRIP-ACNC: ABNORMAL EU/DL
WBC # BLD AUTO: 4.53 K/UL (ref 3.9–12.7)

## 2025-07-10 PROCEDURE — 82248 BILIRUBIN DIRECT: CPT | Mod: TXP

## 2025-07-10 PROCEDURE — 82977 ASSAY OF GGT: CPT | Mod: TXP

## 2025-07-10 PROCEDURE — 84439 ASSAY OF FREE THYROXINE: CPT | Mod: TXP

## 2025-07-10 PROCEDURE — 97802 MEDICAL NUTRITION INDIV IN: CPT | Mod: S$GLB,,,

## 2025-07-10 PROCEDURE — 84466 ASSAY OF TRANSFERRIN: CPT | Mod: TXP

## 2025-07-10 PROCEDURE — 99999 PR PBB SHADOW E&M-EST. PATIENT-LVL III: CPT | Mod: PBBFAC,,,

## 2025-07-10 PROCEDURE — 84443 ASSAY THYROID STIM HORMONE: CPT | Mod: TXP

## 2025-07-10 PROCEDURE — 82306 VITAMIN D 25 HYDROXY: CPT | Mod: TXP

## 2025-07-10 PROCEDURE — 87517 HEPATITIS B DNA QUANT: CPT | Mod: TXP

## 2025-07-10 PROCEDURE — 82728 ASSAY OF FERRITIN: CPT | Mod: TXP

## 2025-07-10 PROCEDURE — 85025 COMPLETE CBC W/AUTO DIFF WBC: CPT | Mod: TXP

## 2025-07-10 PROCEDURE — 86833 HLA CLASS II HIGH DEFIN QUAL: CPT | Performed by: INTERNAL MEDICINE

## 2025-07-10 PROCEDURE — 85610 PROTHROMBIN TIME: CPT | Mod: TXP

## 2025-07-10 PROCEDURE — 36415 COLL VENOUS BLD VENIPUNCTURE: CPT | Mod: TXP

## 2025-07-10 PROCEDURE — 99999 PR PBB SHADOW E&M-EST. PATIENT-LVL III: CPT | Mod: PBBFAC,TXP,, | Performed by: INTERNAL MEDICINE

## 2025-07-10 PROCEDURE — 80321 ALCOHOLS BIOMARKERS 1OR 2: CPT | Mod: TXP

## 2025-07-10 PROCEDURE — 80307 DRUG TEST PRSMV CHEM ANLYZR: CPT | Mod: TXP

## 2025-07-10 PROCEDURE — 81003 URINALYSIS AUTO W/O SCOPE: CPT | Mod: NTX

## 2025-07-10 PROCEDURE — 80053 COMPREHEN METABOLIC PANEL: CPT | Mod: TXP

## 2025-07-10 PROCEDURE — 86832 HLA CLASS I HIGH DEFIN QUAL: CPT | Performed by: INTERNAL MEDICINE

## 2025-07-10 PROCEDURE — 86682 HELMINTH ANTIBODY: CPT | Mod: TXP

## 2025-07-10 NOTE — PROGRESS NOTES
Patient seen in clinic with caregiver.  Patient contact information verified.   Patient's name and date of birth verified.   Consents reviewed and signatures obtained.   Patient given supplies needed to obtain urine specimen.   Instructions reviewed with the patient on the way the specimen should be obtained.   Patient stated that  they understood the information provided for the collection and  placement of the specimen. Specimen collected in clinic.  Patient given supplies and printed instructions for the collection of the 24 hour urine specimen.  Patient reports understanding the instructions provided to obtain the specimen and the storage of the specimen while at home.  Patient given instructions and orders to bring the completed study to [facility]  lab when the collection is completed.  Patient given information about were to have ordered immunizations done.  Patient questions answered and concerns addressed.

## 2025-07-10 NOTE — PROGRESS NOTES
TRANSPLANT NUTRITIONAL ASSESSMENT    Referring Provider: Dr. Laura Morgan     Reason for Visit: Pre-liver transplant work-up    Age: 34 y.o.  Sex: male    Problem List[1]  Past Medical History:   Diagnosis Date    Abdominal pain 05/27/2018    Alcoholic cirrhosis 08/14/2018    Alcoholic hepatitis with ascites     Anemia     CKD (chronic kidney disease) stage 3, GFR 30-59 ml/min 08/14/2018    Encounter for blood transfusion     multiple blood transfusions    ESRD on dialysis     Herpes zoster without complication 12/11/2019    History of hematemesis 09/28/2017    Hypertension     Seizures     Substance abuse     alcohol and opioids    Thrombocytopenia     Transplanted liver     10/2017     Lab Results   Component Value Date     07/10/2025     (H) 11/26/2024    K 3.3 (L) 07/10/2025    K 4.4 11/26/2024    PHOS 2.9 06/16/2025    PHOS 3.4 11/26/2024    MG 1.8 06/16/2025    CHOL 216 (H) 08/14/2018    HDL 62 06/06/2022    HDL 36 (L) 08/14/2018    TRIG 200 (H) 08/14/2018    ALBUMIN 1.9 (L) 07/10/2025    ALBUMIN 3.0 (L) 11/26/2024    PREALBUMIN 13 (L) 12/07/2019    AMMONIA 81 (H) 09/19/2024    HGBA1C <4.0 (A) 10/18/2017    CALCIUM 7.9 (L) 07/10/2025    CALCIUM 8.9 11/26/2024       Current Outpatient Medications   Medication Sig    diazePAM (VALIUM) 5 MG tablet Take 1 tablet (5 mg total) by mouth nightly as needed for Anxiety.    naloxone (NARCAN) 4 mg/actuation Spry CALL 911. SPR CONTENTS OF ONE SPRAYER (0.1ML) INTO ONE NOSTRIL. REPEAT IN 2-3 MIN IF SYMPTOMS OF OPIOID EMERGENCY PERSIST, ALTERNATE NOSTRILS    oxyCODONE (ROXICODONE) 30 MG Tab Take 30 mg by mouth every 6 (six) hours as needed (pain).    sirolimus (RAPAMUNE) 1 MG Tab Take 1 tablet (1 mg total) by mouth once daily.    tenofovir alafenamide (VEMLIDY) 25 mg Tab Take 1 tablet (25 mg total) by mouth once daily.    UNABLE TO FIND Take 500 mg by mouth once daily. medication name: agmatine    valACYclovir (VALTREX) 500 MG tablet Take 500 mg by mouth once  "daily.    [START ON 12/15/2025] hepatitis A virus vaccine (Ped 12MO-18YRS)(PF) (HAVRIX) 720 Unit/0.5 mL syringe Inject 1 mL (1,440 Units total) into the muscle once. for 1 dose (Patient not taking: Reported on 7/10/2025)     No current facility-administered medications for this visit.     Allergies: Bactrim [sulfamethoxazole-trimethoprim]    Food Insecurity: No Food Insecurity (7/10/2025)    Hunger Vital Sign     Worried About Running Out of Food in the Last Year: Never true     Ran Out of Food in the Last Year: Never true   Recent Concern: Food Insecurity - Food Insecurity Present (6/6/2025)    Hunger Vital Sign     Worried About Running Out of Food in the Last Year: Sometimes true     Ran Out of Food in the Last Year: Sometimes true        Ht Readings from Last 1 Encounters:   07/10/25 5' 6" (1.676 m)     Wt Readings from Last 1 Encounters:   07/10/25 73 kg (160 lb 15 oz)      BMI: Estimated body mass index is 25.98 kg/m² as calculated from the following:    Height as of this encounter: 5' 6" (1.676 m).    Weight as of this encounter: 73 kg (160 lb 15 oz).     Usual Weight: 145 lbs   Weight Change/Time: weight gain- fluid gain, paracentesis PRN   Current Diet: Regular Diet   Appetite/Current Intake: poor  Exercise/Physical Activity: Usually walks daily, edema in legs   LFI 6.65   Nutritional/Herbal Supplements: Amazing MVI   Chewing/Swallowing Problems: None   Symptoms: None     Estimated Kcal Need: 9735-8908 kcals (25-30 kcals/kg)  Estimated Protein Need: 65-100g (~1-1.5g/kg)    Nutritional History: Noted jaundiced. Focusing on protein. Decreased appetite. Consuming 1 meal/day. Pt has been more sedentary. Likes food to have flavor. Pt does a lot of the cooking.     Diet Recall    Morning: Skips     Midday: [place] (white/wheat, pickles, lettuce, turkey, cheese (sometimes))    Evening: Sphagetti or pork roast, potatoes, or pot stickers, noodles    Snacks: None     Desserts: Blue bell cookie dough ice cream " (3-4x/week)    Beverages: tea (black tea), water (96 oz)    Seasonings: kosher salt, pepper, herb, spices    Restaurants/Fast Food: never     Nutritional Diagnoses  Problem: undesirable food choices  Etiology: disinterest in diet intervention  Symptoms: Inaccurate report of knowledge (diet recall), need for further education    Educational Need? yes  Barriers: none identified  Discussed with: patient and mother  Interventions: Patient taught nutrition information regarding Pre-liver transplant work-up  .  Pre liver transplant nutrition packet provided and discussed. Pt advised on the recommendations to follow a low sodium diet while taking in adequate protein.  This packet includes label low sodium reading tips, seasoning suggestions, protein intake goal amount (gm) for the individual patient, as well as oral supplement suggestions.   Exercise and physical activity are encouraged.   Goals/Recommendations: diet adherence, small frequent meals and snacks, and limit intake of concentrated sweets  Actions Taken: instruct/provide written information  Patient and/or family comprehend instructions: yes  Outcome: Verbalizes understanding  Monitoring: Contact information provided, will f/u in clinic and communicate with the care team as needed.     Counseling Time: 15 minutes             [1]   Patient Active Problem List  Diagnosis    Metabolic acidosis    History of liver transplant    Prophylactic immunotherapy    Long-term use of immunosuppressant medication    At risk for opportunistic infections    Immunosuppression    Biliary stricture of transplanted liver    Hypothyroidism    Hyperbilirubinemia    Ascites    Constipation    Pleural effusion    Vitamin D deficiency    Alcohol use disorder, severe, in sustained remission    Anemia of chronic renal failure    Chronic kidney disease-mineral and bone disorder    Other pancytopenia    Thrombocytopenia    Anxiety about health    Opioid dependence    Moderate protein  malnutrition    Substance abuse    Lymphocytopenia    Weakness    Right upper quadrant abdominal pain    Anemia of chronic disease    History of opioid abuse    LAURA (acute kidney injury)    Diarrhea    Therapeutic drug monitoring    Stage 3b chronic kidney disease    Anemia    Hypertension secondary to other renal disorders    Cholestasis    Chronic pain    Unilateral inguinal hernia    CKD stage 3a, GFR 45-59 ml/min    Acute hepatitis B

## 2025-07-10 NOTE — PROGRESS NOTES
Transplant Surgery  Liver Transplant Recipient Evaluation    Referring Provider: ZANE Nieto    Subjective:     Reason for Visit: evaluation for liver transplant    History of Present Illness: Jhonny Diana is a 34 y.o. male who is being evaluated for liver transplant due to Acute Alcoholic Hepatitis. Jhonny reports edema, fatigue, and muscle wasting.    External provider notes reviewed: Yes    Review of Systems   Constitutional:  Negative for activity change, appetite change, chills, diaphoresis, fatigue, fever and unexpected weight change.   HENT:  Negative for congestion, dental problem, ear pain, facial swelling, mouth sores, nosebleeds, sore throat, tinnitus, trouble swallowing and voice change.    Eyes:  Negative for photophobia, pain and visual disturbance.   Respiratory:  Negative for apnea, cough, choking, chest tightness and shortness of breath.    Cardiovascular:  Negative for chest pain, palpitations and leg swelling.   Gastrointestinal:  Negative for abdominal distention, abdominal pain, blood in stool, constipation, diarrhea, nausea and vomiting.   Endocrine: Negative for cold intolerance and heat intolerance.   Genitourinary:  Negative for difficulty urinating, dysuria, flank pain, hematuria and urgency.   Musculoskeletal:  Negative for arthralgias and gait problem.   Skin:  Negative for color change, pallor and rash.   Neurological:  Negative for dizziness, tremors, seizures, syncope and light-headedness.   Hematological:  Negative for adenopathy. Does not bruise/bleed easily.   Psychiatric/Behavioral:  Negative for agitation and confusion.      Objective:     Physical Exam  Constitutional:       General: He is not in acute distress.     Appearance: He is well-developed.   HENT:      Head: Normocephalic and atraumatic.      Mouth/Throat:      Pharynx: No oropharyngeal exudate.   Eyes:      General: No scleral icterus.     Conjunctiva/sclera: Conjunctivae normal.      Pupils: Pupils are  equal, round, and reactive to light.   Cardiovascular:      Rate and Rhythm: Normal rate and regular rhythm.      Heart sounds: Normal heart sounds.   Pulmonary:      Effort: Pulmonary effort is normal. No respiratory distress.      Breath sounds: Normal breath sounds.   Abdominal:      General: Bowel sounds are normal. There is no distension.      Palpations: Abdomen is soft.      Tenderness: There is no abdominal tenderness. There is no guarding or rebound.       Musculoskeletal:         General: No swelling. Normal range of motion.      Cervical back: Normal range of motion and neck supple.   Lymphadenopathy:      Cervical: No cervical adenopathy.   Skin:     General: Skin is warm and dry.      Findings: No erythema or rash.   Neurological:      Mental Status: He is alert and oriented to person, place, and time.   Psychiatric:         Mood and Affect: Mood normal.         Behavior: Behavior normal.         Thought Content: Thought content normal.         MELD 3.0: 27 at 7/10/2025  8:42 AM  MELD-Na: 26 at 7/10/2025  8:42 AM  Calculated from:  Serum Creatinine: 1.6 mg/dL at 7/10/2025  8:42 AM  Serum Sodium: 129 mmol/L at 7/10/2025  8:42 AM  Total Bilirubin: 6.7 mg/dL at 7/10/2025  8:42 AM  Serum Albumin: 1.9 g/dL at 7/10/2025  8:42 AM  INR(ratio): 1.3 at 7/10/2025  8:42 AM  Age at listing (hypothetical): 34 years  Sex: Male at 7/10/2025  8:42 AM      Diagnostics:  The following labs have been reviewed: CBC  CMP  PT  INR  PTT  UA  The following radiology images have been independently reviewed and interpreted: CT Abd/Pelvis    Diagnoses:  1. CKD stage 3a, GFR 45-59 ml/min    2. Immunosuppression    3. Prophylactic immunotherapy    4. Long-term use of immunosuppressant medication        Transplant Surgery - Candidacy   Assessment/Plan:     Transplant Candidacy: Jhonny Diana is a 34 y.o. male with ESLD secondary to Acute Alcoholic Hepatitis here for evaluation for possible OLT.  Based on available information,  Jhonny is a suitable liver transplant candidate. We discussed the complexity of re-do liver transplant and the risks involved. He will be presented to selection committee after all tests and evaluations are complete.    Additional testing to be completed according to Liver : Written Order Guideline for Adult Liver Transplant Evaluation (LI-02)    Interpretation of tests and discussion of patient management involves all members of the multidisciplinary transplant team.    Michelle Herrera MD       OS Patient Status  Functional Status: 50% - Requires considerable assistance and frequent medical care  Physical Capacity: No Limitations    Counseling: I discussed with Jhonny the benefits of liver transplantation.  We discussed the evaluation and listing procedures.  We discussed the MELD system and the associated waiting times.  We discussed national and center specific survival results.  We discussed the option of being multiply listed in different OPOs.  We discussed the option of living donation versus  donor transplantation and the advantages and relative disadvantages of each.   We discussed the risks, benefits and potential complications related to surgery including the risks related to anesthesia, bleeding, infection, primary non-function of the allograft, the risk of reoperation as well as the risk of death.  We discussed the typical post-operative course, length of hospitalization, the long-term use of immunosuppressive therapy as well as the need for long-term routine follow-up.    Patient advised that it is recommended that all transplant candidates, and their close contacts and household members receive Covid vaccination.    Coronavirus disease (COVID-19) caused by severe acute respiratory virus coronavirus 2 (SARS-C0V 2) is associated with increased mortality in solid organ transplant recipients (SOT) compared to non-transplant patients. Vaccine responses to vaccination are depressed against  SARS-CoV2 compared to normal individuals but improve with third vaccination doses. Vaccination prior to SOT provides both the best opportunity for transplant candidates to develop protective immunity and to reduce the risk of serious COVID19 infections post transplantation. Organ transplant candidates at [facility] will be required to receive SARS-CoV-2 vaccination prior to being listed with a an active status, whenever possible. Exceptions will be made for disability related reasons or for sincerely held Oriental orthodox beliefs.     PHS: I discussed the use of organs from donors with PHS risk criteria, including the testing protocols utilized, as well as data from the literature regarding the likelihood of transmission of hepatitis or HIV.  The patient is willing to consider such grafts.  DCD: I discussed the use of organs recovered by donation after cardiac death (DCD), including slightly decreased graft survival and greater risk of arterial and biliary complications. The potential advantage to the recipient is possibly receiving a transplant sooner by accepting such an organ. The patient is willing to consider such grafts.  HBcAb: I discussed the use of organs from donors with HBcAb in conjunction with long term use of HBV antiviral drugs, such as lamivudine. The small but measurable risk of hepatitis B seroconversion was discussed as well as the potentially life long need to continue antiviral drugs. The patient is willing to consider such grafts.  HCV Non-viremic recipient: I discussed the use of HCV-positive organs in naive recipients, including the risk of viral transmission to the patients or others, potential insurance barriers for antiviral medication coverage, risk for fibrosing cholestatic hepatitis, death or graft loss. The potential advantage to the recipient is the possibility of receiving a transplant sooner with decreased mortality risk by accepting such an organ. The  patient is willing to consider such grafts.  LDLT: I discussed the nature of living donor liver transplant, including donor risks and more frequent recipient complications. The patient is willing to consider such grafts.  Covid: I discussed that this donor has tested positive for the covid virus, but with very low levels of virus and no evidence of covid disease. Although the risk of transmission is unknown, we believe this donor is not infectious and use of the abdominal organs is safe.  To date, these organs have been used with no evidence of transmission. The patient is willing to consider such grafts.

## 2025-07-10 NOTE — Clinical Note
Start spironolactone 100mg qd. Repeat labs in a week.  Restart ursodiol 300mg BID.  Pt requesting to restart lactulose. Ok to restart lactulose as needed.  Please schedule for repeat CXR to reassess pleural effusion. Please refer for EGD for variceal screening. Thank you.

## 2025-07-10 NOTE — PROGRESS NOTES
Kadeem was seen in clinic for Fast Pass evaluation.  Handbook on pre-liver transplant information (see outline below) was given to the patient.  Patient's mother  accompanied him. To scheduled appointments.  Patient viewed pre-liver transplant education slides via desktop in transplant clinic.  Informed consent signed and written information given on selection criteria.    LIVER TRANSPLANT WORK-UP EDUCATION   I. UNDERSTANDING THE TRANSPLANT PROCESS     A. Transplant team      B. MELD score      C. Balancing urgency and outcome     D. Liver Transplant Options         1.  Donor         2. Living Donor--rationale, benefits     E. Transplant Work-up         1. Medical         2. Psychological and Social--lifetime commitment, life changes, personal plan/ goal         3. Financial--fundraising     F.  Completed work-up and Next Steps    G. Wait Time         1.  Can be listed at more than one center         2.  Can transfer wait time     H. The Call       I. Possible donor options         1. DCD         2. Hep B Core and Hep C Positive         3. Increased Risk     J.  Liver Transplant Surgery         1. Length         2. Transfusions, cell saver         3. Surgical risks         4. What to expect after sugery--Central lines, drains, Garcia catheter, incision, endotracheal              tube, NG tube, length of stay in ICU/ TSU  II.  HOW TO BEST CARE FOR YOURSELF (Take Five To Thrive)  III. UNDERSTANDING LIFE AFTER TRANSPLANT  A. Medicines after transplant      1. Immunosuppression--infection and rejection  B. Labs   IV. ADULT LIVER SURVIVAL RATES

## 2025-07-10 NOTE — PROGRESS NOTES
PHARM.D. PRE-TRANSPLANT NOTE:    This patient's medication therapy was evaluated as part of his pre-transplant evaluation.      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  He sees Mike Leong for pain management in [facility], who prescribes oxycodone 30 mg QID and diazepam 5 mg nightly; this should be considered in [place]-op pain management        The following pharmacologic concerns related to HCV therapy were noted: none      This patient's medication profile was reviewed for considerations for DAA Hepatitis C therapy:    [x]  No current inducers of CYP 3A4 or PGP  [x]  No amiodarone on this patient's EMR profile in the last 24 months  [x]  No past or current atrial fibrillation on this patient's EMR profile       Current Medications[1]        I am available for consultation and can be contacted, as needed by the other members of the Transplant team.           [1]   Current Outpatient Medications   Medication Sig Dispense Refill    diazePAM (VALIUM) 5 MG tablet Take 1 tablet (5 mg total) by mouth nightly as needed for Anxiety.      naloxone (NARCAN) 4 mg/actuation Spry CALL 911. SPR CONTENTS OF ONE SPRAYER (0.1ML) INTO ONE NOSTRIL. REPEAT IN 2-3 MIN IF SYMPTOMS OF OPIOID EMERGENCY PERSIST, ALTERNATE NOSTRILS      oxyCODONE (ROXICODONE) 30 MG Tab Take 30 mg by mouth every 6 (six) hours as needed (pain).      sirolimus (RAPAMUNE) 1 MG Tab Take 1 tablet (1 mg total) by mouth once daily. 30 tablet 11    tenofovir alafenamide (VEMLIDY) 25 mg Tab Take 1 tablet (25 mg total) by mouth once daily. 30 tablet 11    UNABLE TO FIND Take 500 mg by mouth once  daily. medication name: agmatine      valACYclovir (VALTREX) 500 MG tablet Take 500 mg by mouth once daily.      [START ON 12/15/2025] hepatitis A virus vaccine (Ped 12MO-18YRS)(PF) (HAVRIX) 720 Unit/0.5 mL syringe Inject 1 mL (1,440 Units total) into the muscle once. for 1 dose (Patient not taking: Reported on 7/10/2025) 1 mL 0     No current facility-administered medications for this visit.

## 2025-07-10 NOTE — PROGRESS NOTES
Transplant Hepatology   Transplant Evaluation Follow Up Note    Referring provider: No ref. provider found  PCP: Elo Bull MD    Chief complaint: s/p liver transplant 10/19/2017, retransplant evaluation    HPI: Jhonny Diana is a 34 y.o. male with history of OLT in 10/2017 for alcohol related cirrhosis / acute alcohol related hepatitis with post-OLT course complicated by acute HBV infection, non-cirrhotic portal hypertension and fluid overload, who presents to Transplant Hepatology Clinic for liver transplant evaluation. He is accompanied by his mother, Elvira.    Last seen in Transplant Hepatology Clinic on 10/23/2024.    Interval Events:  - Admitted to [facility] 6/6/2025 - 6/16/2025 for SOB, jaundice, anasarca, LAURA. Found to have large R pleural effusion, ascites. Underwent thoracentesis, which showed exudative effusion. Per Pulm, effusion potentially secondary to sirolimus use. Received intravenous diuresis. GPC bacteremia treated with antibiotics, which speciated to S hominis, likely contaminant. Also diagnosed with acute HBV during admission and started on antiviral therapy. Transplant evaluation started during admission.  - Main issue at this time is swelling. Struggling with abdominal swelling, LE swelling, SOB. Not on diuretics at this time.  - He denies signs encephalopathy, GI bleeding.    Past Medical History:   Diagnosis Date    Abdominal pain 05/27/2018    Alcoholic cirrhosis 08/14/2018    Alcoholic hepatitis with ascites     Anemia     CKD (chronic kidney disease) stage 3, GFR 30-59 ml/min 08/14/2018    Encounter for blood transfusion     multiple blood transfusions    ESRD on dialysis     Herpes zoster without complication 12/11/2019    History of hematemesis 09/28/2017    Hypertension     Seizures     Substance abuse     alcohol and opioids    Thrombocytopenia     Transplanted liver     10/2017       Past Surgical History:   Procedure Laterality Date    APPENDECTOMY      CENTRAL VENOUS  "CATHETER TUNNELED INSERTION DOUBLE LUMEN      inserted and removed    ERCP      ERCP N/A 7/2/2018    Procedure: ERCP;  Surgeon: Solis Villanueva MD;  Location: [facility]O [facility]  Service: Endoscopy;  Laterality: N/A;  dialysis/cirrhosis/labs prior to ERCP/svn  platelet    ESOPHAGOGASTRODUODENOSCOPY      ESOPHAGOGASTRODUODENOSCOPY N/A 11/25/2019    Procedure: EGD (ESOPHAGOGASTRODUODENOSCOPY);  Surgeon: Sheri Siddiqui MD;  Location: [facility]);  Service: Endoscopy;  Laterality: N/A;    LIVER TRANSPLANT      10/2017    REPAIR, HERNIA, INGUINAL, WITHOUT HISTORY OF PRIOR REPAIR, AGE 5 YEARS OR OLDER Left 11/26/2024    Procedure: REPAIR, HERNIA, INGUINAL, WITHOUT HISTORY OF PRIOR REPAIR, AGE 5 YEARS OR OLDER;  Surgeon: Elian Bazan MD;  Location: [facility] OR 2ND FLR;  Service: General;  Laterality: Left;       Family History   Problem Relation Name Age of Onset    No Known Problems Mother      Cancer Father          Lung cancer    Alcohol abuse Father      No Known Problems Sister 3     No Known Problems Brother 1     Kidney disease Neg Hx         Social History[1]    Current Medications[2]    Review of patient's allergies indicates:   Allergen Reactions    Bactrim [sulfamethoxazole-trimethoprim] Other (See Comments)     Mookie Trell Syndrome            Vitals:    07/10/25 0905   BP: (!) 103/53   Pulse: 74   Resp: 18   Temp: 97.3 °F (36.3 °C)   TempSrc: Temporal   SpO2: 96%   Weight: 73 kg (160 lb 15 oz)   Height: 5' 6" (1.676 m)   Body mass index is 25.98 kg/m².    Physical Exam  Constitutional:       Appearance: He is ill-appearing.   Eyes:      General: Scleral icterus present.   Cardiovascular:      Rate and Rhythm: Normal rate.   Pulmonary:      Effort: Pulmonary effort is normal.      Breath sounds: Normal breath sounds.   Abdominal:      General: Abdomen is protuberant. There is no distension.      Palpations: Abdomen is soft.      Tenderness: There is abdominal tenderness in the right upper " quadrant and epigastric area.   Musculoskeletal:         General: Swelling present.   Skin:     General: Skin is warm.      Coloration: Skin is jaundiced.   Neurological:      General: No focal deficit present.      Mental Status: He is alert and oriented to person, place, and time. Mental status is at baseline.      Comments: No asterixis.   Psychiatric:         Behavior: Behavior normal.         Thought Content: Thought content normal.       LABS: I personally reviewed pertinent laboratory findings.    Lab Results   Component Value Date    ALT 20 07/10/2025    AST 66 (H) 07/10/2025    GGT 96 (H) 07/10/2025    ALKPHOS 164 (H) 07/10/2025    BILITOT 6.7 (H) 07/10/2025       Lab Results   Component Value Date    WBC 4.53 07/10/2025    HGB 8.7 (L) 07/10/2025    HCT 25.4 (L) 07/10/2025    MCV 96 07/10/2025    PLT 66 (L) 07/10/2025       Lab Results   Component Value Date     (L) 07/10/2025    K 3.3 (L) 07/10/2025     07/10/2025    CO2 22 (L) 07/10/2025    BUN 17 07/10/2025    CREATININE 1.6 (H) 07/10/2025    CALCIUM 7.9 (L) 07/10/2025    ANIONGAP 7 (L) 07/10/2025    ESTGFRAFRICA 53.5 (A) 12/11/2019    EGFRNONAA 46.3 (A) 12/11/2019       Lab Results   Component Value Date    INR 1.3 (H) 07/10/2025    INR 1.3 06/24/2025    INR 1.2 06/16/2025    PROTIME 13.9 (H) 07/10/2025    PROTIME 12.8 (H) 06/16/2025    PROTIME 13.2 (H) 06/15/2025       Lab Results   Component Value Date    SMOOTHMUSCAB Negative 1:40 06/10/2025       Lab Results   Component Value Date    IRON 50 07/10/2025    TIBC 226 (L) 07/10/2025    FERRITIN 1,266.0 (H) 07/10/2025       Lab Results   Component Value Date    HEPAIGM Negative 04/07/2018    HEPBIGM Reactive (A) 06/14/2025    HEPBCAB Reactive (A) 06/14/2025    HEPCAB Non-Reactive 06/14/2025         Lab Results   Component Value Date    MAVERICK Negative <1:80 06/10/2025       Imaging:   I personally reviewed recent imaging studies available on the chart and from outside medical  records.      Assessment: Jhonny Diana is a 34 y.o. male with history of OLT in 10/2017 for alcohol related cirrhosis / acute alcohol related hepatitis with post-OLT course complicated by acute HBV infection, non-cirrhotic portal hypertension and fluid overload, who presents to Transplant Hepatology Clinic for liver transplant evaluation.    1. Acute hepatitis B    2. Abnormal liver enzymes    3. Jaundice    4. History of liver transplant    5. Biliary stricture of transplanted liver    6. Long-term use of immunosuppressant medication    7. Pleural effusion    8. Hypervolemia, unspecified hypervolemia type    9. Portal hypertension    10. Hepatic fibrosis, early fibrosis    11. History of liver biopsy    12. Alcohol use disorder in remission      MELD 3.0: 27 at 7/10/2025  8:42 AM  MELD-Na: 26 at 7/10/2025  8:42 AM  Calculated from:  Serum Creatinine: 1.6 mg/dL at 7/10/2025  8:42 AM  Serum Sodium: 129 mmol/L at 7/10/2025  8:42 AM  Total Bilirubin: 6.7 mg/dL at 7/10/2025  8:42 AM  Serum Albumin: 1.9 g/dL at 7/10/2025  8:42 AM  INR(ratio): 1.3 at 7/10/2025  8:42 AM  Age at listing (hypothetical): 34 years  Sex: Male at 7/10/2025  8:42 AM      UNOS Patient Status  Functional Status: 40% - Disabled: requires special care and assistance  Physical Capacity: Limited Mobility    Transplant Candidacy: Jhonny Diana is a 34 y.o. male with history of OLT in 10/2017 for alcohol related cirrhosis / acute alcohol related hepatitis with post-OLT course complicated by acute HBV infection, non-cirrhotic portal hypertension and fluid overload, who presents to Transplant Hepatology Clinic for liver transplant evaluation. MELD 3.0 23. Based on available information, he is a potential liver transplant candidate. He will undergo liver transplant evaluation after financial approval is obtained. He will be presented to Liver Transplant Selection Committee after all tests and evaluations are  complete.    Recommendations:  Allograft Function:   -  Underwent liver biopsy on 8/5/2024, which demonstrated mild - mod nonspecific lobular hepatitis, with stage 2 fibrosis. CMV, HBV, EBV stains negative on biopsy.   - Repeat liver biopsy 6/8/2025 demonstrated minimal non-specific portal inflammation with stage 2 fibrosis, negative for rejection.     History of Biliary Stricture: will restart UDCA 300mg BID.     Non-cirrhotic Portal Hypertension: TJLBx 6/8/25 showed HVPG 11mmHg. Interestingly, liver biopsy showed early hepatic fibrosis (stage 2) without evidence of cirrhosis. Reticulin stain is pending. Will follow up results.     Ascites/Edema: Start spironolactone 100mg qd. More aggressive diuresis limited by hypoNa. Will consider adding torsemide in near future. Counseled regarding Na 2g diet.    Exudative Pleural Effusion: Not compatible with portal hypertension / hepatic hydrothorax. Per Pulm, sirolimus was a potential etiology. Will repeat CXR for reassessment.     Encephalopathy: Not an active issue. However, patient would like to restart lactulose to help with BM.    Variceal screening: No recent EGD. Due for EGD for variceal screening.    Acute Hepatitis B Infection: Pt thinks likely sexually transmitted. Notably, patient received HBcTotal Ab negative allograft. HBV DNA 20.5M 6/24/25 --> 7.8M 7/3/25. Continue TAF 25mg qd.    Chronic Kidney Disease: Cr 1.6 7/10/25. 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     Alcohol Use Disorder: in sustained remission. PETH negative 6/7/2025. Continue PETH testing per protocol.    Immunizations: Recommend HAV vaccination.    Return to clinic in 2 months.    I have sent communication to the referring physician and/or primary care provider.    Laura Morgan MD  Staff Physician  Hepatology and Liver Transplant  [facility]  [facility] [place]           [1]   Social History  Tobacco Use    Smoking status: Former     Current packs/day:  0.00     Average packs/day: 1 pack/day for 10.0 years (10.0 ttl pk-yrs)     Types: Cigarettes     Start date: 2007     Quit date: 2017     Years since quittin.3     Passive exposure: Current    Smokeless tobacco: Never    Tobacco comments:     Medical Marijuana smoker.    Substance Use Topics    Alcohol use: No     Comment: a fifth of liquor daily for years, cut back over last 2 months    Drug use: No   [2]   Current Outpatient Medications   Medication Sig Dispense Refill    diazePAM (VALIUM) 5 MG tablet Take 1 tablet (5 mg total) by mouth nightly as needed for Anxiety.      [START ON 12/15/2025] hepatitis A virus vaccine (Ped 12MO-18YRS)(PF) (HAVRIX) 720 Unit/0.5 mL syringe Inject 1 mL (1,440 Units total) into the muscle once. for 1 dose (Patient not taking: Reported on 7/10/2025) 1 mL 0    naloxone (NARCAN) 4 mg/actuation Spry CALL 911. SPR CONTENTS OF ONE SPRAYER (0.1ML) INTO ONE NOSTRIL. REPEAT IN 2-3 MIN IF SYMPTOMS OF OPIOID EMERGENCY PERSIST, ALTERNATE NOSTRILS      oxyCODONE (ROXICODONE) 30 MG Tab Take 30 mg by mouth every 6 (six) hours as needed (pain).      sirolimus (RAPAMUNE) 1 MG Tab Take 1 tablet (1 mg total) by mouth once daily. 30 tablet 11    tenofovir alafenamide (VEMLIDY) 25 mg Tab Take 1 tablet (25 mg total) by mouth once daily. 30 tablet 11    UNABLE TO FIND Take 500 mg by mouth once daily. medication name: agmatine      valACYclovir (VALTREX) 500 MG tablet Take 500 mg by mouth once daily.       No current facility-administered medications for this visit.

## 2025-07-10 NOTE — LETTER
July 10, 2025                      [facility]  [address]  [facility] [address]  Phone: [phone]   Patient: Jhonny Diana   MR Number: [id number]   YOB: 1990   Date of Visit: 7/10/2025       Dear       Thank you for referring Jhonny Diana to me for evaluation. Attached you will find relevant portions of my assessment and plan of care.    If you have questions, please do not hesitate to call me. I look forward to following Jhonny Diana along with you.    Sincerely,    Laura Morgan MD    Enclosure    If you would like to receive this communication electronically, please contact [email or web address] or [phone] to request [employer] access.    [employer] is a tool which provides read-only access to select patient information with whom you have a relationship. Its easy to use and provides real time access to review your patients record including encounter summaries, notes, results, and demographic information.    If you feel you have received this communication in error or would no longer like to receive these types of communications, please e-mail [email or web address]

## 2025-07-10 NOTE — PROGRESS NOTES
PRE-TRANSPLANT INFECTIOUS DISEASE CONSULT    Reason for Visit:  Pre-transplant evaluation  Referring Provider: Dr. Laura Morgan     History of Present Illness:    34 y.o. male with a history of CKD, hx liver transplant (2017) on immunosuppression now with hepatitis B with new onset cirrhosis presents for pre-liver transplant evaluation.    Recent thoracentesis 2/2 pleural effusion. Denies hx of SBP. Pt was recently admitted in July for thoracentesis, concern for bacteremia.     Infectious History:  Recent hospital admissions: Yes  Recent infections: Yes  Recent or current antibiotic use: Yes, was treated with vancomycin during recent infection.   History of recurrent infections *(sinus / pneumonia / UTI / SBP)*: No  History of diabetic foot wound or bone/joint infection: No  Recent dental infections, issues or procedures: No, reporting dental procedure coming up for cracked tooth   History of chicken pox: Yes  History of shingles: No  History of STI: Yes, suspects Hep B was sexually transmitted     History of Immunosuppression:  Prior chemotherapy / immunosuppression: Yes, Sirolimus   Prior transplant: Yes, liver in 2017  History of splenectomy: No    Tuberculosis:  Prior screening for latent TB: Yes  Prior diagnosis of latent TB: No  Risk factors for TB *known exposure, incarceration, homelessness*: Yes, previous friend had it.     Geographical exposures:  Currently lives in [facility], with mother   Lived in the following states: [place], [place], [place], [place], [place]  Lived or travelled to the [facility] [place]: No  International travel: No  Travel-associated illness: No    Social/Environmental:  Occupation:  NA  Pets: Yes, cat  Livestock: No  Fishing / hunting: No  Hobbies: reading, maribeth, writing  Water: [place]  Consumption of raw/undercooked meat or seafood?  No  Tobacco: No  Alcohol: No  Recreational drug use:  No  Sexual partners: NA      Past Histories:   Past Medical History:   Diagnosis Date    Abdominal pain  05/27/2018    Alcoholic cirrhosis 08/14/2018    Alcoholic hepatitis with ascites     Anemia     CKD (chronic kidney disease) stage 3, GFR 30-59 ml/min 08/14/2018    Encounter for blood transfusion     multiple blood transfusions    ESRD on dialysis     Herpes zoster without complication 12/11/2019    History of hematemesis 09/28/2017    Hypertension     Seizures     Substance abuse     alcohol and opioids    Thrombocytopenia     Transplanted liver     10/2017     Past Surgical History:   Procedure Laterality Date    APPENDECTOMY      CENTRAL VENOUS CATHETER TUNNELED INSERTION DOUBLE LUMEN      inserted and removed    ERCP      ERCP N/A 7/2/2018    Procedure: ERCP;  Surgeon: Solis Villanueva MD;  Location: [facility] ([address] [place] [address]);  Service: Endoscopy;  Laterality: N/A;  dialysis/cirrhosis/labs prior to ERCP/svn  platelet    ESOPHAGOGASTRODUODENOSCOPY      ESOPHAGOGASTRODUODENOSCOPY N/A 11/25/2019    Procedure: EGD (ESOPHAGOGASTRODUODENOSCOPY);  Surgeon: Sheri Siddiqui MD;  Location: [facility] ([address] [place] [address]);  Service: Endoscopy;  Laterality: N/A;    LIVER TRANSPLANT      10/2017    REPAIR, HERNIA, INGUINAL, WITHOUT HISTORY OF PRIOR REPAIR, AGE 5 YEARS OR OLDER Left 11/26/2024    Procedure: REPAIR, HERNIA, INGUINAL, WITHOUT HISTORY OF PRIOR REPAIR, AGE 5 YEARS OR OLDER;  Surgeon: Elian Bazan MD;  Location: [facility] OR [address] [place] [address];  Service: General;  Laterality: Left;     Family History   Problem Relation Name Age of Onset    No Known Problems Mother      Cancer Father          Lung cancer    Alcohol abuse Father      No Known Problems Sister 3     No Known Problems Brother 1     Kidney disease Neg Hx       Social History[1]  Review of patient's allergies indicates:   Allergen Reactions    Bactrim [sulfamethoxazole-trimethoprim] Other (See Comments)     Mookie Trell Syndrome         Current antibiotics:  Antibiotics (From admission, onward)      None              Review of Systems  Review of Systems   Constitutional:  Negative for chills, fever, night sweats and weight loss.   Respiratory:  Negative for cough and hemoptysis.    Skin:  Negative for poor wound healing, rash and suspicious lesions.   Gastrointestinal:  Negative for diarrhea, nausea and vomiting.   Genitourinary:  Negative for dysuria.          Objective  Physical Exam  Vitals and nursing note reviewed.   Constitutional:       General: He is not in acute distress.     Appearance: Normal appearance. He is well-developed. He is not ill-appearing or diaphoretic.   HENT:      Head: Normocephalic and atraumatic.      Nose: Nose normal. No congestion.      Mouth/Throat:      Dentition: Normal dentition. Does not have dentures. No dental caries or dental abscesses.   Eyes:      General: Scleral icterus present.      Conjunctiva/sclera: Conjunctivae normal.   Cardiovascular:      Rate and Rhythm: Normal rate and regular rhythm.   Pulmonary:      Effort: Pulmonary effort is normal. No respiratory distress.      Breath sounds: No stridor.   Abdominal:      General: Abdomen is flat. There is no distension.      Palpations: Abdomen is soft.   Musculoskeletal:      Cervical back: Normal range of motion.   Skin:     General: Skin is warm and dry.      Coloration: Skin is jaundiced.      Findings: No erythema, lesion or rash.   Neurological:      General: No focal deficit present.      Mental Status: He is alert and oriented to person, place, and time.      Motor: No weakness.      Gait: Gait normal.   Psychiatric:         Mood and Affect: Mood normal.         Behavior: Behavior normal.         Thought Content: Thought content normal.         Judgment: Judgment normal.           Labs:    CBC:   Lab Results   Component Value Date    WBC 4.53 07/10/2025    HGB 8.7 (L) 07/10/2025    HCT 25.4 (L) 07/10/2025    MCV 96 07/10/2025    PLT 66 (L) 07/10/2025    GRAN 3.4 11/26/2024    GRAN 63.1 11/26/2024    LYMPH 36.0 07/10/2025    LYMPH 1.63 07/10/2025    MONO 11.0 07/10/2025    MONO 0.50  07/10/2025    EOSINOPHIL 3.7 11/26/2024       Syphilis screening:   Lab Results   Component Value Date    RPR Non-reactive 08/14/2018    TREPABIGMIGG Non-Reactive 06/14/2025        TB screening:   Lab Results   Component Value Date    QUANTTBGDPL Negative 06/16/2025    TSPOTSCREN See result image under hyperlink 10/11/2017       HIV screening:   Lab Results   Component Value Date    JFJ71VERA Non-Reactive 06/14/2025       Strongyloides IgG:   Lab Results   Component Value Date    STRNGS Negative 06/14/2025       Hepatitis Serologies:   Lab Results   Component Value Date    HEPAIGG Non-Reactive 06/14/2025    HEPBCAB Reactive (A) 06/14/2025    HEPBSAB Non-Reactive 06/14/2025    HEPBSURFABQU Negative 08/14/2018    HEPBSURFABQU 4 08/14/2018    HEPCAB Non-Reactive 06/14/2025        Varicella IgG:   Lab Results   Component Value Date    VARICELLAINT Positive 06/14/2025         Immunization History   Administered Date(s) Administered    COVID-19, MRNA, LN-S, PF (Pfizer) (Purple Cap) 03/03/2021, 03/29/2021, 12/12/2021    DTP 1989, 1989, 02/14/1990, 05/03/1991, 05/05/1994    Hepatitis A, Adult 08/27/2018, 06/14/2025    Hepatitis A, Pediatric/Adolescent, 2 Dose 10/11/2017    Hepatitis B, Adult 10/12/2017    Hepatitis B, Pediatric/Adolescent 11/24/1998, 02/15/1999, 07/07/1999    Hib-HbOC 02/23/1993    Influenza - Trivalent - Fluzone High Dose - PF (65 years and older) 10/12/2017    MMR 05/03/1991, 05/05/1994    OPV 1989, 1989, 05/03/1991, 05/05/1994    Pneumococcal Conjugate - 13 Valent 10/12/2017    Pneumococcal Conjugate - 20 Valent 10/11/2023    Pneumococcal Polysaccharide - 23 Valent 08/27/2018    Tdap 10/11/2017        Immunization History:  Received all childhood vaccines: Yes  All household members receive annual flu vaccine: No  All household members are up to date on COVID vaccine: Yes    Assessment and Plan    1. Risks of Infection: Available serologies were reviewed. No unusual risks of  infection or significant barriers to transplantation were identified from the infectious disease standpoint given the information available at this time.    - Acute infectious issues: None   - Pending serologies: Strongyloides IgG   - Please call if any pending serologic testing is positive.    2. Immunizations:  Based on the patient's immunization history and serologies, the following immunizations are recommended:  - Hepatitis A    Patient does not have immunity to hepatitis A    Vaccination ordered today: Yes, second vaccine due on 12/16/25   - Hepatitis B    Patient does not have immunity to hepatitis B    Vaccination ordered today: No. Reason for not ordering: pt with active hep B   - COVID    Current [facility] vaccination recommendations were discussed with the patient   - Annual high dose influenza     Vaccination ordered today: Yes   - Prevnar 20    Vaccination ordered today: No. Reason for not ordering: Vaccination up to date   - Tdap    Vaccination ordered today: Yes   - Shingrix    Vaccination ordered today: Yes    Recommended Pre-Transplant Immunization Schedule   Vaccine  0m 1m 2m 6m   Pneumococcal conjugate vaccine (Prevnar 20) X      Tetanus-diphtheria-pertussis (Tdap)* X      Hepatitis A Vaccine (Havrix)** X   X   Hepatitis B Vaccine (Heplisav)** X X     Influenza (annual) X      Zoster Recombinant Vaccine (Shingrix) X  X           *Administer booster every 10 years.       **Administer if no immunity demonstrated on serologies               Patient will receive vaccines at local pharmacy. A written prescription was provided for all vaccine doses.     3. Counseling:   I discussed with the patient the risk for increased susceptibility to infections following transplantation including increased risk for infection right after transplant and if rejection should occur.  The patient has been counseled on the importance of vaccinations to decrease risk of infection and severe illness. Specific guidance has been  provided to the patient regarding the patient's occupation, hobbies and activities to avoid future infectious complications.     4. Transplant Candidacy: Based on available information, there are no identified significant barriers to transplantation from an infectious disease standpoint.  Final determination of transplant candidacy will be made once evaluation is complete and reviewed by the Selection Committee.      Follow up with infectious disease as needed.       The total time for evaluation and management services performed on 07/10/2025 was greater than 30 minutes.                [1]   Social History  Tobacco Use    Smoking status: Former     Current packs/day: 0.00     Average packs/day: 1 pack/day for 10.0 years (10.0 ttl pk-yrs)     Types: Cigarettes     Start date: 2007     Quit date: 2017     Years since quittin.3     Passive exposure: Current    Smokeless tobacco: Never    Tobacco comments:     Medical Marijuana smoker.    Substance Use Topics    Alcohol use: No     Comment: a fifth of liquor daily for years, cut back over last 2 months    Drug use: No

## 2025-07-11 ENCOUNTER — TELEPHONE (OUTPATIENT)
Dept: TRANSPLANT | Facility: CLINIC | Age: 35
End: 2025-07-11
Payer: MEDICARE

## 2025-07-11 DIAGNOSIS — R74.8 ELEVATED LIVER ENZYMES: ICD-10-CM

## 2025-07-11 DIAGNOSIS — B16.9 ACUTE HEPATITIS B: Primary | ICD-10-CM

## 2025-07-11 DIAGNOSIS — R60.9 FLUID RETENTION: ICD-10-CM

## 2025-07-11 DIAGNOSIS — Z94.4 S/P LIVER TRANSPLANT: ICD-10-CM

## 2025-07-11 DIAGNOSIS — R74.8 ABNORMAL LIVER ENZYMES: ICD-10-CM

## 2025-07-11 RX ORDER — URSODIOL 300 MG/1
300 CAPSULE ORAL 2 TIMES DAILY
Qty: 60 CAPSULE | Refills: 6 | Status: SHIPPED | OUTPATIENT
Start: 2025-07-11 | End: 2026-02-06

## 2025-07-11 RX ORDER — SPIRONOLACTONE 100 MG/1
100 TABLET, FILM COATED ORAL DAILY
Qty: 30 TABLET | Refills: 6 | Status: SHIPPED | OUTPATIENT
Start: 2025-07-11 | End: 2026-02-06

## 2025-07-11 NOTE — TELEPHONE ENCOUNTER
Phone call returned to patient's mother.  She reports that patient spilled 24 hr urine specimen.  Asking how to proceed.  She was informed that urine specimen will need to be discarded and collection will need to be started over since the 24 hr specimen will be accurate.  She voiced understanding and agrees to notify patient.  During call, also discussed recommendation to repeat cxr to reassess for pleural effusion and EGD to assess for EV.  Per her request, will send orders to [facility] for cxr.  Patient will complete early next week.  Will contact Dr. Coffman (pt's mom's GI provider w/ [facility]) to arrange EGD.  Will keep patient/ pt's mother updated on above.    ===============================================================================      Copied from CRM #[id number]. Topic: General Inquiry - Patient Advice  >> Jul 11, 2025  1:31 PM Jadyn wrote:  Name of Caller: Elvira  pt's mother     Contact Preference:  [phone]     Nature of Call:  Requesting a call back states she have a quick questions in regards to urine specimen

## 2025-07-11 NOTE — TELEPHONE ENCOUNTER
Patient communicated with through the patient portal:             Mr. Diana  Dr. Morgan reviewed your labs. They are stable.  She made no changes to your medications. Please repeat your labs next week on July 16, 2025.  If you have any questions or concerns, please message me.  Thanks so much!.  Rand        ----- Message from Laura Morgan MD sent at 7/3/2025  9:22 AM CDT -----  Labs reviewed.     Sirolimus pending. HBV DNA pending. No changes.  ----- Message -----  From: Lubna Fine RN  Sent: 7/3/2025   9:07 AM CDT  To: Laura Morgan MD

## 2025-07-13 LAB
PLPETH BLD-MCNC: <10 NG/ML
POPETH BLD-MCNC: <10 NG/ML
TOXICOLOGIST REVIEW: NORMAL

## 2025-07-14 LAB — STRONGYLOIDES IGG SER QL IA: NEGATIVE

## 2025-07-15 ENCOUNTER — PATIENT MESSAGE (OUTPATIENT)
Dept: TRANSPLANT | Facility: CLINIC | Age: 35
End: 2025-07-15
Payer: MEDICARE

## 2025-07-15 ENCOUNTER — DOCUMENTATION ONLY (OUTPATIENT)
Dept: TRANSPLANT | Facility: CLINIC | Age: 35
End: 2025-07-15
Payer: MEDICARE

## 2025-07-15 ENCOUNTER — EPISODE CHANGES (OUTPATIENT)
Dept: TRANSPLANT | Facility: CLINIC | Age: 35
End: 2025-07-15

## 2025-07-15 DIAGNOSIS — J90 PLEURAL EFFUSION: Primary | ICD-10-CM

## 2025-07-16 ENCOUNTER — TELEPHONE (OUTPATIENT)
Dept: TRANSPLANT | Facility: CLINIC | Age: 35
End: 2025-07-16
Payer: MEDICARE

## 2025-07-16 ENCOUNTER — COMMITTEE REVIEW (OUTPATIENT)
Dept: TRANSPLANT | Facility: CLINIC | Age: 35
End: 2025-07-16
Payer: MEDICARE

## 2025-07-16 LAB — HPRA INTERPRETATION: NORMAL

## 2025-07-16 NOTE — TELEPHONE ENCOUNTER
Received phone call from patient's mother.  She reports that patient is not feeling well.  He states that he cannot breathe and feels as though he is drowning.  Cxr done today.  Large pleural effusion identified.  Instructed her to have patient go to ER.  Patient's mother asking if patient should come to [facility].  Instructed them to go to nearest ER given above symptoms.  She voiced understanding and reports that patient will report to [facility].  They agree to keep Transplant team updated.  Transplant Hepatologist notified.     Alert and oriented, no focal deficits, no motor or sensory deficits.

## 2025-07-16 NOTE — TELEPHONE ENCOUNTER
Contacted the patient and his mother, Elvira, to inform them about the decision from Liver Transplant Selection Committee on 7/16/25. Patient has been declined for liver transplant due to being high risk from surgical and psychosocial perspective / alcohol recidivism post liver transplant. I recommended seeking a second opinion at another transplant center, such as [facility].     Of note, patient is currently at an outside ED with SOB, fluid overload, pleural effusion. Local team is reaching out to [facility] [place] [facility] Elvira asked me if they should leave the local ED and [place] [facility] [place] and I recommended against this option given he may not be hemodynamically stable to travel here on their own.     Patient and mother understood the recommendations. All questions were answered.    Laura Morgan MD  Staff Physician  Hepatology and Liver Transplant  [facility]  [facility]

## 2025-07-18 ENCOUNTER — RESULTS FOLLOW-UP (OUTPATIENT)
Dept: HEPATOLOGY | Facility: CLINIC | Age: 35
End: 2025-07-18
Payer: MEDICARE

## 2025-07-18 ENCOUNTER — HOSPITAL ENCOUNTER (INPATIENT)
Facility: HOSPITAL | Age: 35
LOS: 18 days | Discharge: HOME | DRG: 163 | End: 2025-08-05
Attending: HOSPITALIST | Admitting: HOSPITALIST
Payer: MEDICARE

## 2025-07-18 DIAGNOSIS — Z94.4 HISTORY OF LIVER TRANSPLANT: ICD-10-CM

## 2025-07-18 DIAGNOSIS — B16.9 ACUTE HEPATITIS B: ICD-10-CM

## 2025-07-18 DIAGNOSIS — J90 PLEURAL EFFUSION, RIGHT: Primary | ICD-10-CM

## 2025-07-18 DIAGNOSIS — R07.9 CHEST PAIN: ICD-10-CM

## 2025-07-18 LAB
CLASS I ANTIBODY COMMENTS - LUMINEX: NORMAL
CLASS II ANTIBODIES - LUMINEX: NORMAL
CLASS II ANTIBODY COMMENTS - LUMINEX: NORMAL
CPRA %: 79
EXT ALBUMIN: 3.1
EXT ALKALINE PHOSPHATASE: 156
EXT ALT: 21
EXT AST: 68
EXT BASOPHIL%: 0.4
EXT BILIRUBIN TOTAL: 8.6
EXT BUN: 20
EXT CALCIUM: 8.8
EXT CHLORIDE: 100
EXT CO2: 24
EXT CREATININE: 1.49 MG/DL
EXT EGFR NO RACE VARIABLE: 62
EXT EOSINOPHIL%: 5.4
EXT GLUCOSE: 84
EXT HEMATOCRIT: 29.9
EXT HEMOGLOBIN: 10.1
EXT INR: 1.4
EXT LYMPH%: 36
EXT MONOCYTES%: 11.7
EXT PLATELETS: 75
EXT POTASSIUM: 3.4
EXT PROTEIN TOTAL: 6.9
EXT PT: 16.4
EXT SEGS%: 46.5
EXT SIROLIMUS LVL: NORMAL
EXT SODIUM: 134 MMOL/L
EXT WBC: 6.5
SERUM COLLECTION DT - LUMINEX CLASS I: NORMAL
SERUM COLLECTION DT - LUMINEX CLASS II: NORMAL
SPCL1 TESTING DATE: NORMAL
SPCL2 TESTING DATE: NORMAL
SPCLU TESTING DATE: NORMAL

## 2025-07-18 PROCEDURE — 20600001 HC STEP DOWN PRIVATE ROOM

## 2025-07-18 RX ORDER — TALC
6 POWDER (GRAM) TOPICAL NIGHTLY PRN
Status: DISCONTINUED | OUTPATIENT
Start: 2025-07-18 | End: 2025-08-05 | Stop reason: HOSPADM

## 2025-07-18 RX ORDER — IPRATROPIUM BROMIDE AND ALBUTEROL SULFATE 2.5; .5 MG/3ML; MG/3ML
3 SOLUTION RESPIRATORY (INHALATION) EVERY 4 HOURS PRN
Status: DISCONTINUED | OUTPATIENT
Start: 2025-07-18 | End: 2025-08-05 | Stop reason: HOSPADM

## 2025-07-18 RX ORDER — GLUCAGON 1 MG
1 KIT INJECTION
Status: DISCONTINUED | OUTPATIENT
Start: 2025-07-18 | End: 2025-08-05 | Stop reason: HOSPADM

## 2025-07-18 RX ORDER — IBUPROFEN 200 MG
16 TABLET ORAL
Status: DISCONTINUED | OUTPATIENT
Start: 2025-07-18 | End: 2025-08-05 | Stop reason: HOSPADM

## 2025-07-18 RX ORDER — SODIUM CHLORIDE 0.9 % (FLUSH) 0.9 %
5 SYRINGE (ML) INJECTION
Status: DISCONTINUED | OUTPATIENT
Start: 2025-07-18 | End: 2025-08-05 | Stop reason: HOSPADM

## 2025-07-18 RX ORDER — NALOXONE HCL 0.4 MG/ML
0.02 VIAL (ML) INJECTION
Status: DISCONTINUED | OUTPATIENT
Start: 2025-07-18 | End: 2025-08-05 | Stop reason: HOSPADM

## 2025-07-18 RX ORDER — ONDANSETRON HYDROCHLORIDE 2 MG/ML
4 INJECTION, SOLUTION INTRAVENOUS EVERY 8 HOURS PRN
Status: DISCONTINUED | OUTPATIENT
Start: 2025-07-18 | End: 2025-08-05 | Stop reason: HOSPADM

## 2025-07-18 RX ORDER — IBUPROFEN 200 MG
24 TABLET ORAL
Status: DISCONTINUED | OUTPATIENT
Start: 2025-07-18 | End: 2025-08-05 | Stop reason: HOSPADM

## 2025-07-18 RX ORDER — POLYETHYLENE GLYCOL 3350 17 G/17G
17 POWDER, FOR SOLUTION ORAL 2 TIMES DAILY PRN
Status: DISCONTINUED | OUTPATIENT
Start: 2025-07-18 | End: 2025-08-05 | Stop reason: HOSPADM

## 2025-07-19 LAB
ABSOLUTE EOSINOPHIL (OHS): 0.18 K/UL
ABSOLUTE MONOCYTE (OHS): 0.39 K/UL (ref 0.3–1)
ABSOLUTE NEUTROPHIL COUNT (OHS): 2.08 K/UL (ref 1.8–7.7)
ALBUMIN SERPL BCP-MCNC: 2 G/DL (ref 3.5–5.2)
ALP SERPL-CCNC: 142 UNIT/L (ref 40–150)
ALT SERPL W/O P-5'-P-CCNC: 24 UNIT/L (ref 10–44)
ANION GAP (OHS): 6 MMOL/L (ref 8–16)
APPEARANCE FLD: NORMAL
AST SERPL-CCNC: 68 UNIT/L (ref 11–45)
BASOPHILS # BLD AUTO: 0.03 K/UL
BASOPHILS NFR BLD AUTO: 0.7 %
BILIRUB SERPL-MCNC: 6.6 MG/DL (ref 0.1–1)
BNP SERPL-MCNC: 244 PG/ML (ref 0–99)
BUN SERPL-MCNC: 19 MG/DL (ref 6–20)
CALCIUM SERPL-MCNC: 8 MG/DL (ref 8.7–10.5)
CHLORIDE SERPL-SCNC: 106 MMOL/L (ref 95–110)
CO2 SERPL-SCNC: 24 MMOL/L (ref 23–29)
COLOR FLD: NORMAL
CREAT SERPL-MCNC: 1.5 MG/DL (ref 0.5–1.4)
EOSINOPHIL NFR FLD MANUAL: 11 %
ERYTHROCYTE [DISTWIDTH] IN BLOOD BY AUTOMATED COUNT: 16.8 % (ref 11.5–14.5)
GFR SERPLBLD CREATININE-BSD FMLA CKD-EPI: >60 ML/MIN/1.73/M2
GLUCOSE FLD-MCNC: 60 MG/DL
GLUCOSE SERPL-MCNC: 81 MG/DL (ref 70–110)
HCT VFR BLD AUTO: 25.1 % (ref 40–54)
HGB BLD-MCNC: 8.1 GM/DL (ref 14–18)
IMM GRANULOCYTES # BLD AUTO: 0.01 K/UL (ref 0–0.04)
IMM GRANULOCYTES NFR BLD AUTO: 0.2 % (ref 0–0.5)
INR PPP: 1.3 (ref 0.8–1.2)
LDH FLD L TO P-CCNC: 169 U/L
LDH SERPL-CCNC: 223 U/L (ref 110–260)
LYMPHOCYTES # BLD AUTO: 1.38 K/UL (ref 1–4.8)
LYMPHOCYTES NFR FLD MANUAL: 37 %
MAGNESIUM SERPL-MCNC: 2.1 MG/DL (ref 1.6–2.6)
MCH RBC QN AUTO: 32.5 PG (ref 27–31)
MCHC RBC AUTO-ENTMCNC: 32.3 G/DL (ref 32–36)
MCV RBC AUTO: 101 FL (ref 82–98)
MONOS+MACROS NFR FLD MANUAL: 27 %
NEUTROPHILS NFR FLD MANUAL: 25 %
NUCLEATED RBC (/100WBC) (OHS): 0 /100 WBC
PHOSPHATE SERPL-MCNC: 2.5 MG/DL (ref 2.7–4.5)
PLATELET # BLD AUTO: 56 K/UL (ref 150–450)
PMV BLD AUTO: 9.4 FL (ref 9.2–12.9)
POTASSIUM SERPL-SCNC: 3.7 MMOL/L (ref 3.5–5.1)
PROT FLD-MCNC: 2.9 G/DL
PROT SERPL-MCNC: 6.4 GM/DL (ref 6–8.4)
PROTHROMBIN TIME: 13.8 SECONDS (ref 9–12.5)
RBC # BLD AUTO: 2.49 M/UL (ref 4.6–6.2)
RELATIVE EOSINOPHIL (OHS): 4.4 %
RELATIVE LYMPHOCYTE (OHS): 33.9 % (ref 18–48)
RELATIVE MONOCYTE (OHS): 9.6 % (ref 4–15)
RELATIVE NEUTROPHIL (OHS): 51.2 % (ref 38–73)
SIROLIMUS BLD-MCNC: 7.6 NG/ML (ref 4–20)
SODIUM SERPL-SCNC: 136 MMOL/L (ref 136–145)
WBC # BLD AUTO: 4.07 K/UL (ref 3.9–12.7)
WBC # FLD: 24 /CU MM

## 2025-07-19 PROCEDURE — 25000003 PHARM REV CODE 250: Performed by: STUDENT IN AN ORGANIZED HEALTH CARE EDUCATION/TRAINING PROGRAM

## 2025-07-19 PROCEDURE — 25000003 PHARM REV CODE 250

## 2025-07-19 PROCEDURE — 87075 CULTR BACTERIA EXCEPT BLOOD: CPT

## 2025-07-19 PROCEDURE — 87116 MYCOBACTERIA CULTURE: CPT

## 2025-07-19 PROCEDURE — 20600001 HC STEP DOWN PRIVATE ROOM

## 2025-07-19 PROCEDURE — 85025 COMPLETE CBC W/AUTO DIFF WBC: CPT

## 2025-07-19 PROCEDURE — 83735 ASSAY OF MAGNESIUM: CPT

## 2025-07-19 PROCEDURE — 85610 PROTHROMBIN TIME: CPT

## 2025-07-19 PROCEDURE — 87070 CULTURE OTHR SPECIMN AEROBIC: CPT

## 2025-07-19 PROCEDURE — 80053 COMPREHEN METABOLIC PANEL: CPT

## 2025-07-19 PROCEDURE — 0W9930Z DRAINAGE OF RIGHT PLEURAL CAVITY WITH DRAINAGE DEVICE, PERCUTANEOUS APPROACH: ICD-10-PCS | Performed by: STUDENT IN AN ORGANIZED HEALTH CARE EDUCATION/TRAINING PROGRAM

## 2025-07-19 PROCEDURE — 80195 ASSAY OF SIROLIMUS: CPT

## 2025-07-19 PROCEDURE — 89051 BODY FLUID CELL COUNT: CPT

## 2025-07-19 PROCEDURE — 99222 1ST HOSP IP/OBS MODERATE 55: CPT | Mod: ,,, | Performed by: INTERNAL MEDICINE

## 2025-07-19 PROCEDURE — 83615 LACTATE (LD) (LDH) ENZYME: CPT

## 2025-07-19 PROCEDURE — 84157 ASSAY OF PROTEIN OTHER: CPT

## 2025-07-19 PROCEDURE — 36415 COLL VENOUS BLD VENIPUNCTURE: CPT

## 2025-07-19 PROCEDURE — 84100 ASSAY OF PHOSPHORUS: CPT

## 2025-07-19 PROCEDURE — 82945 GLUCOSE OTHER FLUID: CPT

## 2025-07-19 PROCEDURE — 83880 ASSAY OF NATRIURETIC PEPTIDE: CPT

## 2025-07-19 PROCEDURE — 63600175 PHARM REV CODE 636 W HCPCS: Performed by: STUDENT IN AN ORGANIZED HEALTH CARE EDUCATION/TRAINING PROGRAM

## 2025-07-19 PROCEDURE — 63600175 PHARM REV CODE 636 W HCPCS

## 2025-07-19 PROCEDURE — 87206 SMEAR FLUORESCENT/ACID STAI: CPT

## 2025-07-19 PROCEDURE — 87205 SMEAR GRAM STAIN: CPT

## 2025-07-19 RX ORDER — OXYCODONE HYDROCHLORIDE 10 MG/1
30 TABLET ORAL EVERY 6 HOURS PRN
Refills: 0 | Status: DISCONTINUED | OUTPATIENT
Start: 2025-07-19 | End: 2025-08-05 | Stop reason: HOSPADM

## 2025-07-19 RX ORDER — SPIRONOLACTONE 50 MG/1
100 TABLET, FILM COATED ORAL DAILY
Status: DISCONTINUED | OUTPATIENT
Start: 2025-07-19 | End: 2025-07-19

## 2025-07-19 RX ORDER — FUROSEMIDE 10 MG/ML
20 INJECTION INTRAMUSCULAR; INTRAVENOUS DAILY
Status: DISCONTINUED | OUTPATIENT
Start: 2025-07-20 | End: 2025-07-20

## 2025-07-19 RX ORDER — SIROLIMUS 1 MG/1
1 TABLET, FILM COATED ORAL DAILY
Status: DISCONTINUED | OUTPATIENT
Start: 2025-07-19 | End: 2025-08-05 | Stop reason: HOSPADM

## 2025-07-19 RX ORDER — LIDOCAINE HYDROCHLORIDE 10 MG/ML
INJECTION, SOLUTION INFILTRATION; PERINEURAL
Status: COMPLETED | OUTPATIENT
Start: 2025-07-19 | End: 2025-07-19

## 2025-07-19 RX ORDER — DIAZEPAM 5 MG/1
5 TABLET ORAL NIGHTLY PRN
Status: DISCONTINUED | OUTPATIENT
Start: 2025-07-19 | End: 2025-07-30

## 2025-07-19 RX ORDER — URSODIOL 300 MG/1
300 CAPSULE ORAL 2 TIMES DAILY
Status: DISCONTINUED | OUTPATIENT
Start: 2025-07-19 | End: 2025-07-19

## 2025-07-19 RX ORDER — VALACYCLOVIR HYDROCHLORIDE 500 MG/1
500 TABLET, FILM COATED ORAL DAILY
Status: DISCONTINUED | OUTPATIENT
Start: 2025-07-19 | End: 2025-08-05 | Stop reason: HOSPADM

## 2025-07-19 RX ADMIN — OXYCODONE HYDROCHLORIDE 30 MG: 10 TABLET ORAL at 05:07

## 2025-07-19 RX ADMIN — LIDOCAINE HYDROCHLORIDE 3 MG: 10 INJECTION, SOLUTION INFILTRATION; PERINEURAL at 12:07

## 2025-07-19 RX ADMIN — LACTULOSE 10 G: 20 SOLUTION ORAL at 09:07

## 2025-07-19 RX ADMIN — OXYCODONE HYDROCHLORIDE 30 MG: 10 TABLET ORAL at 11:07

## 2025-07-19 RX ADMIN — VALACYCLOVIR HYDROCHLORIDE 500 MG: 500 TABLET, FILM COATED ORAL at 09:07

## 2025-07-19 RX ADMIN — OXYCODONE HYDROCHLORIDE 30 MG: 10 TABLET ORAL at 06:07

## 2025-07-19 RX ADMIN — DIAZEPAM 5 MG: 5 TABLET ORAL at 11:07

## 2025-07-19 RX ADMIN — SIROLIMUS 1 MG: 1 TABLET ORAL at 09:07

## 2025-07-19 RX ADMIN — OXYCODONE HYDROCHLORIDE 30 MG: 10 TABLET ORAL at 12:07

## 2025-07-19 RX ADMIN — DIAZEPAM 5 MG: 5 TABLET ORAL at 12:07

## 2025-07-19 RX ADMIN — TENOFOVIR ALAFENAMIDE 25 MG: 25 TABLET ORAL at 09:07

## 2025-07-19 RX ADMIN — SPIRONOLACTONE 100 MG: 50 TABLET ORAL at 09:07

## 2025-07-20 LAB
ABSOLUTE EOSINOPHIL (OHS): 0.15 K/UL
ABSOLUTE MONOCYTE (OHS): 0.38 K/UL (ref 0.3–1)
ABSOLUTE NEUTROPHIL COUNT (OHS): 1.59 K/UL (ref 1.8–7.7)
ALBUMIN SERPL BCP-MCNC: 2 G/DL (ref 3.5–5.2)
ALP SERPL-CCNC: 152 UNIT/L (ref 40–150)
ALT SERPL W/O P-5'-P-CCNC: 26 UNIT/L (ref 10–44)
ANION GAP (OHS): 8 MMOL/L (ref 8–16)
AST SERPL-CCNC: 75 UNIT/L (ref 11–45)
BASOPHILS # BLD AUTO: 0.03 K/UL
BASOPHILS NFR BLD AUTO: 0.9 %
BILIRUB SERPL-MCNC: 6.3 MG/DL (ref 0.1–1)
BUN SERPL-MCNC: 19 MG/DL (ref 6–20)
CALCIUM SERPL-MCNC: 8 MG/DL (ref 8.7–10.5)
CHLORIDE SERPL-SCNC: 108 MMOL/L (ref 95–110)
CO2 SERPL-SCNC: 23 MMOL/L (ref 23–29)
CREAT SERPL-MCNC: 1.6 MG/DL (ref 0.5–1.4)
ERYTHROCYTE [DISTWIDTH] IN BLOOD BY AUTOMATED COUNT: 16.7 % (ref 11.5–14.5)
GFR SERPLBLD CREATININE-BSD FMLA CKD-EPI: 57 ML/MIN/1.73/M2
GLUCOSE SERPL-MCNC: 91 MG/DL (ref 70–110)
GRAM STN SPEC: NORMAL
GRAM STN SPEC: NORMAL
HCT VFR BLD AUTO: 26.8 % (ref 40–54)
HGB BLD-MCNC: 8.6 GM/DL (ref 14–18)
IMM GRANULOCYTES # BLD AUTO: 0 K/UL (ref 0–0.04)
IMM GRANULOCYTES NFR BLD AUTO: 0 % (ref 0–0.5)
INR PPP: 1.3 (ref 0.8–1.2)
LYMPHOCYTES # BLD AUTO: 1.24 K/UL (ref 1–4.8)
MCH RBC QN AUTO: 33 PG (ref 27–31)
MCHC RBC AUTO-ENTMCNC: 32.1 G/DL (ref 32–36)
MCV RBC AUTO: 103 FL (ref 82–98)
NUCLEATED RBC (/100WBC) (OHS): 0 /100 WBC
PLATELET # BLD AUTO: 54 K/UL (ref 150–450)
PMV BLD AUTO: 9.4 FL (ref 9.2–12.9)
POTASSIUM SERPL-SCNC: 3.9 MMOL/L (ref 3.5–5.1)
PROT SERPL-MCNC: 6.4 GM/DL (ref 6–8.4)
PROTHROMBIN TIME: 14 SECONDS (ref 9–12.5)
RBC # BLD AUTO: 2.61 M/UL (ref 4.6–6.2)
RELATIVE EOSINOPHIL (OHS): 4.4 %
RELATIVE LYMPHOCYTE (OHS): 36.6 % (ref 18–48)
RELATIVE MONOCYTE (OHS): 11.2 % (ref 4–15)
RELATIVE NEUTROPHIL (OHS): 46.9 % (ref 38–73)
SIROLIMUS BLD-MCNC: 7.9 NG/ML (ref 4–20)
SODIUM SERPL-SCNC: 139 MMOL/L (ref 136–145)
WBC # BLD AUTO: 3.39 K/UL (ref 3.9–12.7)

## 2025-07-20 PROCEDURE — 99223 1ST HOSP IP/OBS HIGH 75: CPT | Mod: GC,,, | Performed by: INTERNAL MEDICINE

## 2025-07-20 PROCEDURE — 63600175 PHARM REV CODE 636 W HCPCS

## 2025-07-20 PROCEDURE — 20600001 HC STEP DOWN PRIVATE ROOM

## 2025-07-20 PROCEDURE — 85610 PROTHROMBIN TIME: CPT

## 2025-07-20 PROCEDURE — 25000003 PHARM REV CODE 250

## 2025-07-20 PROCEDURE — 85025 COMPLETE CBC W/AUTO DIFF WBC: CPT

## 2025-07-20 PROCEDURE — 80195 ASSAY OF SIROLIMUS: CPT

## 2025-07-20 PROCEDURE — 80053 COMPREHEN METABOLIC PANEL: CPT

## 2025-07-20 PROCEDURE — 94761 N-INVAS EAR/PLS OXIMETRY MLT: CPT

## 2025-07-20 PROCEDURE — 63600175 PHARM REV CODE 636 W HCPCS: Performed by: STUDENT IN AN ORGANIZED HEALTH CARE EDUCATION/TRAINING PROGRAM

## 2025-07-20 PROCEDURE — 80321 ALCOHOLS BIOMARKERS 1OR 2: CPT | Performed by: STUDENT IN AN ORGANIZED HEALTH CARE EDUCATION/TRAINING PROGRAM

## 2025-07-20 PROCEDURE — 36415 COLL VENOUS BLD VENIPUNCTURE: CPT

## 2025-07-20 RX ORDER — TORSEMIDE 20 MG/1
20 TABLET ORAL DAILY
Status: DISCONTINUED | OUTPATIENT
Start: 2025-07-21 | End: 2025-07-30

## 2025-07-20 RX ADMIN — VALACYCLOVIR HYDROCHLORIDE 500 MG: 500 TABLET, FILM COATED ORAL at 09:07

## 2025-07-20 RX ADMIN — OXYCODONE HYDROCHLORIDE 30 MG: 10 TABLET ORAL at 05:07

## 2025-07-20 RX ADMIN — SIROLIMUS 1 MG: 1 TABLET ORAL at 05:07

## 2025-07-20 RX ADMIN — TENOFOVIR ALAFENAMIDE 25 MG: 25 TABLET ORAL at 05:07

## 2025-07-20 RX ADMIN — OXYCODONE HYDROCHLORIDE 30 MG: 10 TABLET ORAL at 12:07

## 2025-07-20 RX ADMIN — FUROSEMIDE 20 MG: 10 INJECTION, SOLUTION INTRAMUSCULAR; INTRAVENOUS at 09:07

## 2025-07-20 RX ADMIN — OXYCODONE HYDROCHLORIDE 30 MG: 10 TABLET ORAL at 06:07

## 2025-07-21 LAB
ABSOLUTE EOSINOPHIL (OHS): 0.1 K/UL
ABSOLUTE MONOCYTE (OHS): 0.36 K/UL (ref 0.3–1)
ABSOLUTE NEUTROPHIL COUNT (OHS): 0.97 K/UL (ref 1.8–7.7)
ACID FAST MOD KINY STN SPEC: NORMAL
ALBUMIN SERPL BCP-MCNC: 1.8 G/DL (ref 3.5–5.2)
ALP SERPL-CCNC: 145 UNIT/L (ref 40–150)
ALT SERPL W/O P-5'-P-CCNC: 25 UNIT/L (ref 10–44)
AMYLASE FLD-CCNC: 12 U/L
ANION GAP (OHS): 6 MMOL/L (ref 8–16)
AST SERPL-CCNC: 69 UNIT/L (ref 11–45)
BASOPHILS # BLD AUTO: 0.01 K/UL
BASOPHILS NFR BLD AUTO: 0.4 %
BILIRUB SERPL-MCNC: 5.2 MG/DL (ref 0.1–1)
BUN SERPL-MCNC: 18 MG/DL (ref 6–20)
CALCIUM SERPL-MCNC: 7.6 MG/DL (ref 8.7–10.5)
CHLORIDE SERPL-SCNC: 109 MMOL/L (ref 95–110)
CO2 SERPL-SCNC: 24 MMOL/L (ref 23–29)
CREAT SERPL-MCNC: 1.5 MG/DL (ref 0.5–1.4)
ERYTHROCYTE [DISTWIDTH] IN BLOOD BY AUTOMATED COUNT: 16.5 % (ref 11.5–14.5)
GFR SERPLBLD CREATININE-BSD FMLA CKD-EPI: >60 ML/MIN/1.73/M2
GLUCOSE SERPL-MCNC: 97 MG/DL (ref 70–110)
HCT VFR BLD AUTO: 22.6 % (ref 40–54)
HGB BLD-MCNC: 7.2 GM/DL (ref 14–18)
IMM GRANULOCYTES # BLD AUTO: 0.01 K/UL (ref 0–0.04)
IMM GRANULOCYTES NFR BLD AUTO: 0.4 % (ref 0–0.5)
INR PPP: 1.3 (ref 0.8–1.2)
LYMPHOCYTES # BLD AUTO: 1.07 K/UL (ref 1–4.8)
MCH RBC QN AUTO: 32.1 PG (ref 27–31)
MCHC RBC AUTO-ENTMCNC: 31.9 G/DL (ref 32–36)
MCV RBC AUTO: 101 FL (ref 82–98)
NUCLEATED RBC (/100WBC) (OHS): 0 /100 WBC
PLATELET # BLD AUTO: 50 K/UL (ref 150–450)
PLATELET BLD QL SMEAR: ABNORMAL
PMV BLD AUTO: 9.5 FL (ref 9.2–12.9)
POTASSIUM SERPL-SCNC: 3.6 MMOL/L (ref 3.5–5.1)
PROT SERPL-MCNC: 5.7 GM/DL (ref 6–8.4)
PROTHROMBIN TIME: 14.1 SECONDS (ref 9–12.5)
RBC # BLD AUTO: 2.24 M/UL (ref 4.6–6.2)
RELATIVE EOSINOPHIL (OHS): 4 %
RELATIVE LYMPHOCYTE (OHS): 42.5 % (ref 18–48)
RELATIVE MONOCYTE (OHS): 14.3 % (ref 4–15)
RELATIVE NEUTROPHIL (OHS): 38.4 % (ref 38–73)
SIROLIMUS BLD-MCNC: 6.4 NG/ML (ref 4–20)
SODIUM SERPL-SCNC: 139 MMOL/L (ref 136–145)
WBC # BLD AUTO: 2.52 K/UL (ref 3.9–12.7)

## 2025-07-21 PROCEDURE — 80053 COMPREHEN METABOLIC PANEL: CPT

## 2025-07-21 PROCEDURE — 85025 COMPLETE CBC W/AUTO DIFF WBC: CPT

## 2025-07-21 PROCEDURE — 94761 N-INVAS EAR/PLS OXIMETRY MLT: CPT

## 2025-07-21 PROCEDURE — 99233 SBSQ HOSP IP/OBS HIGH 50: CPT | Mod: GC,,, | Performed by: INTERNAL MEDICINE

## 2025-07-21 PROCEDURE — 25000003 PHARM REV CODE 250: Performed by: STUDENT IN AN ORGANIZED HEALTH CARE EDUCATION/TRAINING PROGRAM

## 2025-07-21 PROCEDURE — 25000003 PHARM REV CODE 250

## 2025-07-21 PROCEDURE — 63600175 PHARM REV CODE 636 W HCPCS

## 2025-07-21 PROCEDURE — 20600001 HC STEP DOWN PRIVATE ROOM

## 2025-07-21 PROCEDURE — 85610 PROTHROMBIN TIME: CPT

## 2025-07-21 PROCEDURE — 80195 ASSAY OF SIROLIMUS: CPT

## 2025-07-21 PROCEDURE — 82150 ASSAY OF AMYLASE: CPT

## 2025-07-21 PROCEDURE — 36415 COLL VENOUS BLD VENIPUNCTURE: CPT

## 2025-07-21 RX ADMIN — SIROLIMUS 1 MG: 1 TABLET ORAL at 06:07

## 2025-07-21 RX ADMIN — OXYCODONE HYDROCHLORIDE 30 MG: 10 TABLET ORAL at 12:07

## 2025-07-21 RX ADMIN — OXYCODONE HYDROCHLORIDE 30 MG: 10 TABLET ORAL at 06:07

## 2025-07-21 RX ADMIN — TORSEMIDE 20 MG: 20 TABLET ORAL at 08:07

## 2025-07-21 RX ADMIN — TENOFOVIR ALAFENAMIDE 25 MG: 25 TABLET ORAL at 06:07

## 2025-07-21 RX ADMIN — DIAZEPAM 5 MG: 5 TABLET ORAL at 12:07

## 2025-07-21 RX ADMIN — VALACYCLOVIR HYDROCHLORIDE 500 MG: 500 TABLET, FILM COATED ORAL at 08:07

## 2025-07-22 LAB
ABSOLUTE EOSINOPHIL (OHS): 0.08 K/UL
ABSOLUTE MONOCYTE (OHS): 0.35 K/UL (ref 0.3–1)
ABSOLUTE NEUTROPHIL COUNT (OHS): 0.85 K/UL (ref 1.8–7.7)
ALBUMIN SERPL BCP-MCNC: 1.9 G/DL (ref 3.5–5.2)
ALP SERPL-CCNC: 154 UNIT/L (ref 40–150)
ALT SERPL W/O P-5'-P-CCNC: 28 UNIT/L (ref 10–44)
ANION GAP (OHS): 8 MMOL/L (ref 8–16)
AST SERPL-CCNC: 76 UNIT/L (ref 11–45)
BASOPHILS # BLD AUTO: 0.01 K/UL
BASOPHILS NFR BLD AUTO: 0.5 %
BILIRUB SERPL-MCNC: 5.3 MG/DL (ref 0.1–1)
BUN SERPL-MCNC: 20 MG/DL (ref 6–20)
CALCIUM SERPL-MCNC: 7.7 MG/DL (ref 8.7–10.5)
CHLORIDE SERPL-SCNC: 104 MMOL/L (ref 95–110)
CO2 SERPL-SCNC: 27 MMOL/L (ref 23–29)
CREAT SERPL-MCNC: 1.6 MG/DL (ref 0.5–1.4)
ERYTHROCYTE [DISTWIDTH] IN BLOOD BY AUTOMATED COUNT: 16.4 % (ref 11.5–14.5)
GFR SERPLBLD CREATININE-BSD FMLA CKD-EPI: 57 ML/MIN/1.73/M2
GLUCOSE SERPL-MCNC: 84 MG/DL (ref 70–110)
HCT VFR BLD AUTO: 22.3 % (ref 40–54)
HGB BLD-MCNC: 7.5 GM/DL (ref 14–18)
IMM GRANULOCYTES # BLD AUTO: 0.01 K/UL (ref 0–0.04)
IMM GRANULOCYTES NFR BLD AUTO: 0.5 % (ref 0–0.5)
LYMPHOCYTES # BLD AUTO: 0.9 K/UL (ref 1–4.8)
MCH RBC QN AUTO: 33 PG (ref 27–31)
MCHC RBC AUTO-ENTMCNC: 33.6 G/DL (ref 32–36)
MCV RBC AUTO: 98 FL (ref 82–98)
NUCLEATED RBC (/100WBC) (OHS): 0 /100 WBC
PLATELET # BLD AUTO: 46 K/UL (ref 150–450)
PMV BLD AUTO: 10 FL (ref 9.2–12.9)
POTASSIUM SERPL-SCNC: 3.1 MMOL/L (ref 3.5–5.1)
PROT SERPL-MCNC: 5.9 GM/DL (ref 6–8.4)
RBC # BLD AUTO: 2.27 M/UL (ref 4.6–6.2)
RELATIVE EOSINOPHIL (OHS): 3.6 %
RELATIVE LYMPHOCYTE (OHS): 40.9 % (ref 18–48)
RELATIVE MONOCYTE (OHS): 15.9 % (ref 4–15)
RELATIVE NEUTROPHIL (OHS): 38.6 % (ref 38–73)
SIROLIMUS BLD-MCNC: 5.5 NG/ML (ref 4–20)
SODIUM SERPL-SCNC: 139 MMOL/L (ref 136–145)
WBC # BLD AUTO: 2.2 K/UL (ref 3.9–12.7)

## 2025-07-22 PROCEDURE — 36415 COLL VENOUS BLD VENIPUNCTURE: CPT

## 2025-07-22 PROCEDURE — 99231 SBSQ HOSP IP/OBS SF/LOW 25: CPT | Mod: GC,,, | Performed by: INTERNAL MEDICINE

## 2025-07-22 PROCEDURE — 63600175 PHARM REV CODE 636 W HCPCS

## 2025-07-22 PROCEDURE — 25500020 PHARM REV CODE 255: Performed by: STUDENT IN AN ORGANIZED HEALTH CARE EDUCATION/TRAINING PROGRAM

## 2025-07-22 PROCEDURE — 20600001 HC STEP DOWN PRIVATE ROOM

## 2025-07-22 PROCEDURE — 25000003 PHARM REV CODE 250: Performed by: STUDENT IN AN ORGANIZED HEALTH CARE EDUCATION/TRAINING PROGRAM

## 2025-07-22 PROCEDURE — 25000003 PHARM REV CODE 250

## 2025-07-22 PROCEDURE — 85025 COMPLETE CBC W/AUTO DIFF WBC: CPT | Performed by: STUDENT IN AN ORGANIZED HEALTH CARE EDUCATION/TRAINING PROGRAM

## 2025-07-22 PROCEDURE — 82435 ASSAY OF BLOOD CHLORIDE: CPT | Performed by: STUDENT IN AN ORGANIZED HEALTH CARE EDUCATION/TRAINING PROGRAM

## 2025-07-22 PROCEDURE — 36415 COLL VENOUS BLD VENIPUNCTURE: CPT | Performed by: STUDENT IN AN ORGANIZED HEALTH CARE EDUCATION/TRAINING PROGRAM

## 2025-07-22 PROCEDURE — 80195 ASSAY OF SIROLIMUS: CPT

## 2025-07-22 RX ORDER — POTASSIUM CHLORIDE 20 MEQ/1
40 TABLET, EXTENDED RELEASE ORAL 2 TIMES DAILY
Status: COMPLETED | OUTPATIENT
Start: 2025-07-22 | End: 2025-07-22

## 2025-07-22 RX ADMIN — TENOFOVIR ALAFENAMIDE 25 MG: 25 TABLET ORAL at 05:07

## 2025-07-22 RX ADMIN — OXYCODONE HYDROCHLORIDE 30 MG: 10 TABLET ORAL at 05:07

## 2025-07-22 RX ADMIN — IOHEXOL 75 ML: 350 INJECTION, SOLUTION INTRAVENOUS at 07:07

## 2025-07-22 RX ADMIN — OXYCODONE HYDROCHLORIDE 30 MG: 10 TABLET ORAL at 12:07

## 2025-07-22 RX ADMIN — POTASSIUM CHLORIDE 40 MEQ: 1500 TABLET, EXTENDED RELEASE ORAL at 02:07

## 2025-07-22 RX ADMIN — TORSEMIDE 20 MG: 20 TABLET ORAL at 08:07

## 2025-07-22 RX ADMIN — DIAZEPAM 5 MG: 5 TABLET ORAL at 12:07

## 2025-07-22 RX ADMIN — VALACYCLOVIR HYDROCHLORIDE 500 MG: 500 TABLET, FILM COATED ORAL at 08:07

## 2025-07-22 RX ADMIN — POTASSIUM CHLORIDE 40 MEQ: 1500 TABLET, EXTENDED RELEASE ORAL at 11:07

## 2025-07-22 RX ADMIN — DIAZEPAM 5 MG: 5 TABLET ORAL at 11:07

## 2025-07-22 RX ADMIN — LACTULOSE 10 G: 20 SOLUTION ORAL at 08:07

## 2025-07-22 RX ADMIN — SIROLIMUS 1 MG: 1 TABLET ORAL at 05:07

## 2025-07-22 RX ADMIN — OXYCODONE HYDROCHLORIDE 30 MG: 10 TABLET ORAL at 11:07

## 2025-07-23 ENCOUNTER — ANESTHESIA EVENT (OUTPATIENT)
Dept: SURGERY | Facility: HOSPITAL | Age: 35
End: 2025-07-23
Payer: MEDICARE

## 2025-07-23 LAB
ABO + RH BLD: NORMAL
ABSOLUTE EOSINOPHIL (OHS): 0.09 K/UL
ABSOLUTE MONOCYTE (OHS): 0.35 K/UL (ref 0.3–1)
ABSOLUTE NEUTROPHIL COUNT (OHS): 0.97 K/UL (ref 1.8–7.7)
ALBUMIN SERPL BCP-MCNC: 2 G/DL (ref 3.5–5.2)
ALP SERPL-CCNC: 174 UNIT/L (ref 40–150)
ALT SERPL W/O P-5'-P-CCNC: 33 UNIT/L (ref 10–44)
ANION GAP (OHS): 10 MMOL/L (ref 8–16)
AST SERPL-CCNC: 91 UNIT/L (ref 11–45)
BACTERIA SPEC AEROBE CULT: NO GROWTH
BASOPHILS # BLD AUTO: 0.02 K/UL
BASOPHILS NFR BLD AUTO: 0.9 %
BILIRUB SERPL-MCNC: 5.2 MG/DL (ref 0.1–1)
BLD PROD TYP BPU: NORMAL
BLOOD GROUP ANTIBODIES SERPL: NORMAL
BLOOD UNIT EXPIRATION DATE: NORMAL
BLOOD UNIT TYPE CODE: 6200
BUN SERPL-MCNC: 19 MG/DL (ref 6–20)
CALCIUM SERPL-MCNC: 7.9 MG/DL (ref 8.7–10.5)
CHLORIDE SERPL-SCNC: 101 MMOL/L (ref 95–110)
CO2 SERPL-SCNC: 28 MMOL/L (ref 23–29)
CREAT SERPL-MCNC: 1.6 MG/DL (ref 0.5–1.4)
CROSSMATCH INTERPRETATION: NORMAL
DISPENSE STATUS: NORMAL
ERYTHROCYTE [DISTWIDTH] IN BLOOD BY AUTOMATED COUNT: 16.2 % (ref 11.5–14.5)
GFR SERPLBLD CREATININE-BSD FMLA CKD-EPI: 57 ML/MIN/1.73/M2
GLUCOSE SERPL-MCNC: 94 MG/DL (ref 70–110)
HCT VFR BLD AUTO: 23.1 % (ref 40–54)
HGB BLD-MCNC: 7.6 GM/DL (ref 14–18)
IMM GRANULOCYTES # BLD AUTO: 0.01 K/UL (ref 0–0.04)
IMM GRANULOCYTES NFR BLD AUTO: 0.4 % (ref 0–0.5)
INDIRECT COOMBS: ABNORMAL
LYMPHOCYTES # BLD AUTO: 0.85 K/UL (ref 1–4.8)
MCH RBC QN AUTO: 32.8 PG (ref 27–31)
MCHC RBC AUTO-ENTMCNC: 32.9 G/DL (ref 32–36)
MCV RBC AUTO: 100 FL (ref 82–98)
NUCLEATED RBC (/100WBC) (OHS): 0 /100 WBC
PLATELET # BLD AUTO: 44 K/UL (ref 150–450)
PMV BLD AUTO: 10.1 FL (ref 9.2–12.9)
POTASSIUM SERPL-SCNC: 3.5 MMOL/L (ref 3.5–5.1)
PROT SERPL-MCNC: 6.2 GM/DL (ref 6–8.4)
RBC # BLD AUTO: 2.32 M/UL (ref 4.6–6.2)
RELATIVE EOSINOPHIL (OHS): 3.9 %
RELATIVE LYMPHOCYTE (OHS): 37.1 % (ref 18–48)
RELATIVE MONOCYTE (OHS): 15.3 % (ref 4–15)
RELATIVE NEUTROPHIL (OHS): 42.4 % (ref 38–73)
RH BLD: ABNORMAL
SODIUM SERPL-SCNC: 139 MMOL/L (ref 136–145)
SPECIMEN OUTDATE: ABNORMAL
UNIT NUMBER: NORMAL
WBC # BLD AUTO: 2.29 K/UL (ref 3.9–12.7)

## 2025-07-23 PROCEDURE — 25000003 PHARM REV CODE 250

## 2025-07-23 PROCEDURE — 25000003 PHARM REV CODE 250: Performed by: STUDENT IN AN ORGANIZED HEALTH CARE EDUCATION/TRAINING PROGRAM

## 2025-07-23 PROCEDURE — 63600175 PHARM REV CODE 636 W HCPCS

## 2025-07-23 PROCEDURE — 20600001 HC STEP DOWN PRIVATE ROOM

## 2025-07-23 PROCEDURE — 86902 BLOOD TYPE ANTIGEN DONOR EA: CPT

## 2025-07-23 PROCEDURE — 30233R1 TRANSFUSION OF NONAUTOLOGOUS PLATELETS INTO PERIPHERAL VEIN, PERCUTANEOUS APPROACH: ICD-10-PCS | Performed by: STUDENT IN AN ORGANIZED HEALTH CARE EDUCATION/TRAINING PROGRAM

## 2025-07-23 PROCEDURE — 80053 COMPREHEN METABOLIC PANEL: CPT | Performed by: STUDENT IN AN ORGANIZED HEALTH CARE EDUCATION/TRAINING PROGRAM

## 2025-07-23 PROCEDURE — 86870 RBC ANTIBODY IDENTIFICATION: CPT

## 2025-07-23 PROCEDURE — 85025 COMPLETE CBC W/AUTO DIFF WBC: CPT | Performed by: STUDENT IN AN ORGANIZED HEALTH CARE EDUCATION/TRAINING PROGRAM

## 2025-07-23 PROCEDURE — 99232 SBSQ HOSP IP/OBS MODERATE 35: CPT | Mod: GC,,, | Performed by: INTERNAL MEDICINE

## 2025-07-23 PROCEDURE — P9035 PLATELET PHERES LEUKOREDUCED: HCPCS | Performed by: STUDENT IN AN ORGANIZED HEALTH CARE EDUCATION/TRAINING PROGRAM

## 2025-07-23 PROCEDURE — 36430 TRANSFUSION BLD/BLD COMPNT: CPT | Performed by: STUDENT IN AN ORGANIZED HEALTH CARE EDUCATION/TRAINING PROGRAM

## 2025-07-23 PROCEDURE — 36415 COLL VENOUS BLD VENIPUNCTURE: CPT | Performed by: STUDENT IN AN ORGANIZED HEALTH CARE EDUCATION/TRAINING PROGRAM

## 2025-07-23 PROCEDURE — 86900 BLOOD TYPING SEROLOGIC ABO: CPT

## 2025-07-23 RX ORDER — HYDROCODONE BITARTRATE AND ACETAMINOPHEN 500; 5 MG/1; MG/1
TABLET ORAL
Status: DISCONTINUED | OUTPATIENT
Start: 2025-07-23 | End: 2025-07-28

## 2025-07-23 RX ADMIN — OXYCODONE HYDROCHLORIDE 30 MG: 10 TABLET ORAL at 05:07

## 2025-07-23 RX ADMIN — OXYCODONE HYDROCHLORIDE 30 MG: 10 TABLET ORAL at 11:07

## 2025-07-23 RX ADMIN — TENOFOVIR ALAFENAMIDE 25 MG: 25 TABLET ORAL at 05:07

## 2025-07-23 RX ADMIN — DIAZEPAM 5 MG: 5 TABLET ORAL at 11:07

## 2025-07-23 RX ADMIN — LACTULOSE 10 G: 20 SOLUTION ORAL at 09:07

## 2025-07-23 RX ADMIN — SIROLIMUS 1 MG: 1 TABLET ORAL at 05:07

## 2025-07-23 RX ADMIN — VALACYCLOVIR HYDROCHLORIDE 500 MG: 500 TABLET, FILM COATED ORAL at 09:07

## 2025-07-23 RX ADMIN — TORSEMIDE 20 MG: 20 TABLET ORAL at 09:07

## 2025-07-23 NOTE — ANESTHESIA PREPROCEDURE EVALUATION
[facility]  Anesthesia Pre-Operative Evaluation     Patient Name: Jhonny Diana  YOB: 1990  MRN: [id number]  [facility]: [id number]      Code Status: Full Code   Date of Procedure: 7/24/2025  Anesthesia: General Procedure: Procedure(s) (LRB):  VATS (VIDEO-ASSISTED THORACOSCOPIC SURGERY) (Right)  Pre-Operative Diagnosis: Pleural effusion, right [J90]  Proceduralist: Surgeons and Role:     * Delgado Dover MD - Primary          SUBJECTIVE:     Pre-operative evaluation for Procedure(s) (LRB):  VATS (VIDEO-ASSISTED THORACOSCOPIC SURGERY) (Right)     07/23/2025    Jhonny Diana is a 35 y.o. male with PMHx of anemia, HTN, hypothyroidism, CKD 3b, former smoker, liver transplant in 2017 for ETOH cirrhosis with post-transplant period notable for LAURA previously on HD, anastomotic stricture requiring dilatation and stenting, opioid use, and ETOH relapse (2019), new hepatitis B infection, recent admission for decompensated cirrhosis who presents as a transfer from [facility] for a pleural effusion.     Most recent H/H 7.6/23, plts 44    Patient now presents for the above procedure(s).       Anticoagulants   Medication Route Frequency        ________________________________________  Results for orders placed during the hospital encounter of 06/06/25    Echo    Interpretation Summary    Left Ventricle: The left ventricle is normal in size. Ventricular mass is normal. Normal wall thickness. Normal wall motion. There is normal systolic function with a visually estimated ejection fraction of 65 - 70%. Ejection fraction is approximately 68%. There is normal diastolic function.    Right Ventricle: The right ventricle is normal in size Wall thickness is normal. Systolic function is normal.    Left Atrium: The left atrium is severely dilated    Aortic Valve: There is mild aortic valve sclerosis. There is moderate annular calcification present.    Tricuspid Valve: There is mild regurgitation.     Pulmonary Artery: The estimated pulmonary artery systolic pressure is 28 mmHg.    IVC/SVC: Normal venous pressure at 3 mmHg.    Pericardium: Right pleural effusion. Ascites present.    ________________________________________    Prev airway: Date/Time: 11/21/2022 7:04 AM  Performed by: Petar Villarreal CRNA  Authorized by: Tiffanie Renee MD      Intubation:     Induction:  Intravenous    Intubated:  Postinduction    Mask Ventilation:  Easy with oral airway    Attempts:  1    Attempted By:  CRNA    Method of Intubation:  Direct    Blade:  Francisco Javier 3    Laryngeal View Grade: Grade I - full view of cords      Difficult Airway Encountered?: No      Complications:  None    Airway Device:  Oral endotracheal tube    Airway Device Size:  7.5    Style/Cuff Inflation:  Cuffed    Inflation Amount (mL):  8    Tube secured:  21    Secured at:  The lips    Placement Verified By:  Capnometry    Complicating Factors:  None    Findings Post-Intubation:  BS equal bilateral and atraumatic/condition of teeth unchanged       LDA:   Peripheral IV 07/16/25 20 G Anterior;Distal;Left Forearm (Active)   Site Assessment Clean;Dry;Intact;No redness;No swelling 07/23/25 0000   Line Securement Device Secured with sutureless device 07/22/25 2000   Extremity Assessment Distal to IV No abnormal discoloration;No redness;No swelling;No warmth 07/22/25 2000   Line Status Flushed;Saline locked 07/23/25 0000   Dressing Status Clean;Dry;Intact 07/23/25 0000   Dressing Intervention Integrity maintained 07/23/25 0000   Dressing Change Due 07/23/25 07/22/25 2000   Number of days: 7            Chest Tube 07/19/25 1256 Right 10 Fr. (Active)   Chest Tube WDL WDL 07/22/25 2000   Function -20 cm H2O 07/22/25 2000   Air Leak/Fluctuation air leak not present 07/22/25 2000   Safety all tubing connections taped;all connections secured;suction checked;2 rubber-tipped hemostats w/ patient 07/22/25 2000   Securement tubing secured to body distal to insertion site  with sutures 07/22/25 2000   Patency Intervention Tip/tilt 07/22/25 2000   Drainage Description Serosanguineous 07/22/25 2000   Dressing Appearance clean and dry 07/23/25 0000   Dressing Care dressing changed 07/22/25 2000   Right Subcutaneous Emphysema none present 07/22/25 2000   Site Assessment Clean;Dry;Intact;No redness;No swelling 07/23/25 0000   Surrounding Skin Dry;Intact 07/22/25 2000   Output (mL) 50 mL 07/23/25 0536   Number of days: 3       Drips: None documented.      Problem List[1]    Review of patient's allergies indicates:   Allergen Reactions    Bactrim [sulfamethoxazole-trimethoprim] Other (See Comments)     Mookie Trell Syndrome       Current Inpatient Medications:    lactulose  10 g Oral BID    sirolimus  1 mg Oral Daily    tenofovir alafenamide  25 mg Oral Daily    torsemide  20 mg Oral Daily    valACYclovir  500 mg Oral Daily       Medications Ordered Prior to Encounter[2]    Past Surgical History:   Procedure Laterality Date    APPENDECTOMY      CENTRAL VENOUS CATHETER TUNNELED INSERTION DOUBLE LUMEN      inserted and removed    ERCP      ERCP N/A 7/2/2018    Procedure: ERCP;  Surgeon: Solis Villanueva MD;  Location: [address]);  Service: Endoscopy;  Laterality: N/A;  dialysis/cirrhosis/labs prior to ERCP/svn  platelet    ESOPHAGOGASTRODUODENOSCOPY      ESOPHAGOGASTRODUODENOSCOPY N/A 11/25/2019    Procedure: EGD (ESOPHAGOGASTRODUODENOSCOPY);  Surgeon: Sheri Siddiqui MD;  Location: [address]);  Service: Endoscopy;  Laterality: N/A;    LIVER TRANSPLANT      10/2017    REPAIR, HERNIA, INGUINAL, WITHOUT HISTORY OF PRIOR REPAIR, AGE 5 YEARS OR OLDER Left 11/26/2024    Procedure: REPAIR, HERNIA, INGUINAL, WITHOUT HISTORY OF PRIOR REPAIR, AGE 5 YEARS OR OLDER;  Surgeon: Elian Bazan MD;  Location: [facility] OR [address] [place] [address];  Service: General;  Laterality: Left;       Social History:  Tobacco Use: Medium Risk (7/10/2025)    Patient History     Smoking Tobacco Use: Former      Smokeless Tobacco Use: Never     Passive Exposure: Current       Alcohol Use: Not At Risk (7/19/2025)    AUDIT-C     Frequency of Alcohol Consumption: Never     Average Number of Drinks: Patient does not drink     Frequency of Binge Drinking: Never       OBJECTIVE:     Vital Signs Range:  BMI Readings from Last 1 Encounters:   07/18/25 25.55 kg/m²       Temp:  [36.7 °C (98 °F)-37.2 °C (98.9 °F)]   Pulse:  [85-88]   Resp:  [15-18]   BP: (121-134)/(64-69)   SpO2:  [96 %-98 %]        Significant Labs:        Component Value Date/Time    WBC 2.29 (L) 07/23/2025 0457    HGB 7.6 (L) 07/23/2025 0457    HGB 11.9 (L) 11/26/2024 0524    HCT 23.1 (L) 07/23/2025 0457    HCT 34.5 (L) 11/26/2024 0524    HCT 28 (L) 10/20/2017 0348    PLT 44 (L) 07/23/2025 0457    PLT 86 (L) 11/26/2024 0524     07/23/2025 0457     11/26/2024 0524    K 3.5 07/23/2025 0457    K 4.4 11/26/2024 0524     07/23/2025 0457     (H) 11/26/2024 0524    CO2 28 07/23/2025 0457    CO2 18 (L) 11/26/2024 0524    GLU 94 07/23/2025 0457     (H) 11/26/2024 0524    BUN 19 07/23/2025 0457    CREATININE 1.6 (H) 07/23/2025 0457    MG 2.1 07/19/2025 0632    PHOS 2.5 (L) 07/19/2025 0632    PHOS 3.4 11/26/2024 0524    CALCIUM 7.9 (L) 07/23/2025 0457    CALCIUM 8.9 11/26/2024 0524    ALBUMIN 2.0 (L) 07/23/2025 0457    ALBUMIN 3.0 (L) 11/26/2024 0524    PROT 6.2 07/23/2025 0457    PROT 7.1 11/26/2024 0524    ALKPHOS 174 (H) 07/23/2025 0457    ALKPHOS 160 (H) 11/26/2024 0524    BILITOT 5.2 (H) 07/23/2025 0457    BILITOT 2.3 (H) 11/26/2024 0524    AST 91 (H) 07/23/2025 0457    AST 40 11/26/2024 0524    ALT 33 07/23/2025 0457    ALT 24 11/26/2024 0524    INR 1.3 (H) 07/21/2025 0504    INR 1.3 06/24/2025 0740    HGBA1C <4.0 (A) 10/18/2017 1246        Please see Results Review for additional labs.     Diagnostic Studies: No relevant studies.    EKG:   Results for orders placed or performed during the hospital encounter of 06/06/25   EKG 12-lead     Collection Time: 06/06/25  5:42 PM   Result Value Ref Range    QRS Duration 104 ms    [facility] QTC Calculation 485 ms    Narrative    Test Reason : R78.81,    Vent. Rate :  96 BPM     Atrial Rate :  96 BPM     P-R Int : 190 ms          QRS Dur : 104 ms      QT Int : 384 ms       P-R-T Axes :  20 -12  23 degrees    QTcB Int : 485 ms    Normal sinus rhythm  Prolonged QT  Abnormal R wave progression in the precordial leads  Nonspecific ST and/or T wave abnormalities  Leftward axis  Abnormal ECG  When compared with ECG of 19-Sep-2024 16:26,  T wave inversion now evident in Inferior leads  Nonspecific T wave abnormality now evident in Anterior leads  Confirmed by Brandon Busch ([id number]) on 6/7/2025 8:53:16 AM    Referred By: PROVIDER NOTINSYSTEM           Confirmed By: Brandon Busch       ECHO:  See subjective, if available.      ASSESSMENT/PLAN:     Pre-op Assessment    I have reviewed the Patient Summary Reports.     I have reviewed the Nursing Notes. I have reviewed the NPO Status.   I have reviewed the Medications.     Review of Systems  Anesthesia Hx:  No problems with previous Anesthesia   History of prior surgery of interest to airway management or planning:          Denies Family Hx of Anesthesia complications.    Denies Personal Hx of Anesthesia complications.                    Social:  Alcohol Use, Former Smoker       Hematology/Oncology:       -- Anemia:                                  Cardiovascular:     Denies Pacemaker. Hypertension   Denies MI.  Denies CAD.    Denies CABG/stent.             Patient not on beta blockers                    Hypertension         Pulmonary:    Denies COPD.  Denies Asthma.                    Renal/:  Chronic Renal Disease, CKD        Kidney Function/Disease             Hepatic/GI:      Denies GERD. Liver Disease, Hepatitis  Not Taking GLP-1 Agonists         Liver Disease, Hepatitis        Neurological:    Denies CVA.    Seizures (2017 2/2 acute illness. No further seizure-like  activity since), well controlled           Seizure Disorder                          Endocrine:  Denies Diabetes. Hypothyroidism       Hypothyroidism        Denies Obesity / BMI > 30, Denies Morbid Obesity / BMI > 40  Psych:  Psychiatric History                  Physical Exam  General: Alert and Oriented    Airway:  Mallampati: II / II  Mouth Opening: Small, but > 3cm  TM Distance: Normal  Tongue: Normal  Neck ROM: Normal ROM    Dental:  Intact        Anesthesia Plan  Type of Anesthesia, risks & benefits discussed:    Anesthesia Type: Gen ETT  Intra-op Monitoring Plan: Standard ASA Monitors  Post Op Pain Control Plan: multimodal analgesia and IV/PO Opioids PRN  Induction:  IV  Airway Plan: Direct and Video, Post-Induction  Informed Consent: Informed consent signed with the Patient and all parties understand the risks and agree with anesthesia plan.  All questions answered.   ASA Score: 3  Day of Surgery Review of History & Physical: H&P Update referred to the surgeon/provider.    Ready For Surgery From Anesthesia Perspective.     .           [1]   Patient Active Problem List  Diagnosis    Metabolic acidosis    History of liver transplant    Prophylactic immunotherapy    Long-term use of immunosuppressant medication    At risk for opportunistic infections    Immunosuppression    Biliary stricture of transplanted liver    Hypothyroidism    Hyperbilirubinemia    Ascites    Constipation    Recurrent Right-Sided Pleural Effusion    Vitamin D deficiency    Alcohol use disorder, severe, in sustained remission    Anemia of chronic renal failure    Chronic kidney disease-mineral and bone disorder    Other pancytopenia    Thrombocytopenia    Anxiety about health    Opioid dependence    Moderate protein malnutrition    Substance abuse    Lymphocytopenia    Weakness    Right upper quadrant abdominal pain    Anemia of chronic disease    History of opioid abuse    LAURA (acute kidney injury)    Diarrhea    Therapeutic drug  monitoring    Stage 3b chronic kidney disease    Anemia    Hypertension secondary to other renal disorders    Cholestasis    Chronic pain    Unilateral inguinal hernia    CKD stage 3a, GFR 45-59 ml/min    Acute hepatitis B   [2]   No current facility-administered medications on file prior to encounter.     Current Outpatient Medications on File Prior to Encounter   Medication Sig Dispense Refill    diazePAM (VALIUM) 5 MG tablet Take 1 tablet (5 mg total) by mouth nightly as needed for Anxiety.      [START ON 12/15/2025] hepatitis A virus vaccine (Ped 12MO-18YRS)(PF) (HAVRIX) 720 Unit/0.5 mL syringe Inject 1 mL (1,440 Units total) into the muscle once. for 1 dose (Patient not taking: Reported on 7/10/2025) 1 mL 0    lactulose 10 gram/15 ml (CHRONULAC) 10 gram/15 mL (15 mL) solution Take 15 mLs (10 g total) by mouth 2 (two) times daily. 900 mL 3    naloxone (NARCAN) 4 mg/actuation Spry CALL 911. SPR CONTENTS OF ONE SPRAYER (0.1ML) INTO ONE NOSTRIL. REPEAT IN 2-3 MIN IF SYMPTOMS OF OPIOID EMERGENCY PERSIST, ALTERNATE NOSTRILS      oxyCODONE (ROXICODONE) 30 MG Tab Take 30 mg by mouth every 6 (six) hours as needed (pain).      sirolimus (RAPAMUNE) 1 MG Tab Take 1 tablet (1 mg total) by mouth once daily. 30 tablet 11    spironolactone (ALDACTONE) 100 MG tablet Take 1 tablet (100 mg total) by mouth once daily. 30 tablet 6    tenofovir alafenamide (VEMLIDY) 25 mg Tab Take 1 tablet (25 mg total) by mouth once daily. 30 tablet 11    UNABLE TO FIND Take 500 mg by mouth once daily. medication name: agmatine      ursodioL (ACTIGALL) 300 mg capsule Take 1 capsule (300 mg total) by mouth 2 (two) times daily. 60 capsule 6    valACYclovir (VALTREX) 500 MG tablet Take 500 mg by mouth once daily.

## 2025-07-24 ENCOUNTER — ANESTHESIA (OUTPATIENT)
Dept: SURGERY | Facility: HOSPITAL | Age: 35
End: 2025-07-24
Payer: MEDICARE

## 2025-07-24 LAB
ABO + RH BLD: NORMAL
ABO + RH BLD: NORMAL
ABSOLUTE EOSINOPHIL (OHS): 0.05 K/UL
ABSOLUTE EOSINOPHIL (OHS): 0.08 K/UL
ABSOLUTE MONOCYTE (OHS): 0.15 K/UL (ref 0.3–1)
ABSOLUTE MONOCYTE (OHS): 0.37 K/UL (ref 0.3–1)
ABSOLUTE NEUTROPHIL COUNT (OHS): 0.91 K/UL (ref 1.8–7.7)
ABSOLUTE NEUTROPHIL COUNT (OHS): 1.81 K/UL (ref 1.8–7.7)
ALBUMIN SERPL BCP-MCNC: 2 G/DL (ref 3.5–5.2)
ALBUMIN SERPL BCP-MCNC: 2.8 G/DL (ref 3.5–5.2)
ALP SERPL-CCNC: 174 UNIT/L (ref 40–150)
ALP SERPL-CCNC: 192 UNIT/L (ref 40–150)
ALT SERPL W/O P-5'-P-CCNC: 27 UNIT/L (ref 0–55)
ALT SERPL W/O P-5'-P-CCNC: 34 UNIT/L (ref 10–44)
ANION GAP (OHS): 12 MMOL/L (ref 8–16)
ANION GAP (OHS): 9 MMOL/L (ref 8–16)
APTT PPP: 28.1 SECONDS (ref 21–32)
AST SERPL-CCNC: 118 UNIT/L (ref 0–50)
AST SERPL-CCNC: 97 UNIT/L (ref 11–45)
BASOPHILS # BLD AUTO: 0.01 K/UL
BASOPHILS # BLD AUTO: 0.02 K/UL
BASOPHILS NFR BLD AUTO: 0.4 %
BASOPHILS NFR BLD AUTO: 0.8 %
BILIRUB SERPL-MCNC: 5 MG/DL (ref 0.1–1)
BILIRUB SERPL-MCNC: 6.4 MG/DL (ref 0.1–1)
BLD PROD TYP BPU: NORMAL
BLD PROD TYP BPU: NORMAL
BLOOD UNIT EXPIRATION DATE: NORMAL
BLOOD UNIT EXPIRATION DATE: NORMAL
BLOOD UNIT TYPE CODE: 5100
BLOOD UNIT TYPE CODE: 5100
BUN SERPL-MCNC: 19 MG/DL (ref 6–20)
BUN SERPL-MCNC: 20 MG/DL (ref 6–20)
CALCIUM SERPL-MCNC: 8 MG/DL (ref 8.7–10.5)
CALCIUM SERPL-MCNC: 8.7 MG/DL (ref 8.7–10.5)
CHLORIDE SERPL-SCNC: 104 MMOL/L (ref 95–110)
CHLORIDE SERPL-SCNC: 104 MMOL/L (ref 95–110)
CO2 SERPL-SCNC: 24 MMOL/L (ref 23–29)
CO2 SERPL-SCNC: 27 MMOL/L (ref 23–29)
CREAT SERPL-MCNC: 1.6 MG/DL (ref 0.5–1.4)
CREAT SERPL-MCNC: 1.8 MG/DL (ref 0.5–1.4)
CROSSMATCH INTERPRETATION: NORMAL
CROSSMATCH INTERPRETATION: NORMAL
DISPENSE STATUS: NORMAL
DISPENSE STATUS: NORMAL
ERYTHROCYTE [DISTWIDTH] IN BLOOD BY AUTOMATED COUNT: 16.2 % (ref 11.5–14.5)
ERYTHROCYTE [DISTWIDTH] IN BLOOD BY AUTOMATED COUNT: 16.5 % (ref 11.5–14.5)
GFR SERPLBLD CREATININE-BSD FMLA CKD-EPI: 49 ML/MIN/1.73/M2
GFR SERPLBLD CREATININE-BSD FMLA CKD-EPI: 57 ML/MIN/1.73/M2
GLUCOSE SERPL-MCNC: 106 MG/DL (ref 70–110)
GLUCOSE SERPL-MCNC: 85 MG/DL (ref 70–110)
HCT VFR BLD AUTO: 24.5 % (ref 40–54)
HCT VFR BLD AUTO: 28.7 % (ref 40–54)
HGB BLD-MCNC: 8.1 GM/DL (ref 14–18)
HGB BLD-MCNC: 9.8 GM/DL (ref 14–18)
IMM GRANULOCYTES # BLD AUTO: 0.01 K/UL (ref 0–0.04)
IMM GRANULOCYTES # BLD AUTO: 0.01 K/UL (ref 0–0.04)
IMM GRANULOCYTES NFR BLD AUTO: 0.4 % (ref 0–0.5)
IMM GRANULOCYTES NFR BLD AUTO: 0.4 % (ref 0–0.5)
INR PPP: 1.2 (ref 0.8–1.2)
LYMPHOCYTES # BLD AUTO: 0.51 K/UL (ref 1–4.8)
LYMPHOCYTES # BLD AUTO: 1 K/UL (ref 1–4.8)
MAGNESIUM SERPL-MCNC: 1.8 MG/DL (ref 1.6–2.6)
MCH RBC QN AUTO: 32.9 PG (ref 27–31)
MCH RBC QN AUTO: 33.2 PG (ref 27–31)
MCHC RBC AUTO-ENTMCNC: 33.1 G/DL (ref 32–36)
MCHC RBC AUTO-ENTMCNC: 34.1 G/DL (ref 32–36)
MCV RBC AUTO: 100 FL (ref 82–98)
MCV RBC AUTO: 97 FL (ref 82–98)
NUCLEATED RBC (/100WBC) (OHS): 0 /100 WBC
NUCLEATED RBC (/100WBC) (OHS): 0 /100 WBC
PHOSPHATE SERPL-MCNC: 3.6 MG/DL (ref 2.7–4.5)
PLATELET # BLD AUTO: 54 K/UL (ref 150–450)
PLATELET # BLD AUTO: 59 K/UL (ref 150–450)
PLPETH BLD-MCNC: <10 NG/ML
PMV BLD AUTO: 10 FL (ref 9.2–12.9)
PMV BLD AUTO: 9.7 FL (ref 9.2–12.9)
POPETH BLD-MCNC: <10 NG/ML
POTASSIUM SERPL-SCNC: 3.4 MMOL/L (ref 3.5–5.1)
POTASSIUM SERPL-SCNC: 3.6 MMOL/L (ref 3.5–5.1)
PROT SERPL-MCNC: 6.1 GM/DL (ref 6–8.4)
PROT SERPL-MCNC: 7.2 GM/DL (ref 6–8.4)
PROTHROMBIN TIME: 12.5 SECONDS (ref 9–12.5)
RBC # BLD AUTO: 2.46 M/UL (ref 4.6–6.2)
RBC # BLD AUTO: 2.95 M/UL (ref 4.6–6.2)
RELATIVE EOSINOPHIL (OHS): 2 %
RELATIVE EOSINOPHIL (OHS): 3.3 %
RELATIVE LYMPHOCYTE (OHS): 20.1 % (ref 18–48)
RELATIVE LYMPHOCYTE (OHS): 41.8 % (ref 18–48)
RELATIVE MONOCYTE (OHS): 15.5 % (ref 4–15)
RELATIVE MONOCYTE (OHS): 5.9 % (ref 4–15)
RELATIVE NEUTROPHIL (OHS): 38.2 % (ref 38–73)
RELATIVE NEUTROPHIL (OHS): 71.2 % (ref 38–73)
SODIUM SERPL-SCNC: 140 MMOL/L (ref 136–145)
SODIUM SERPL-SCNC: 140 MMOL/L (ref 136–145)
TOXICOLOGIST REVIEW: NORMAL
UNIT NUMBER: NORMAL
UNIT NUMBER: NORMAL
WBC # BLD AUTO: 2.39 K/UL (ref 3.9–12.7)
WBC # BLD AUTO: 2.54 K/UL (ref 3.9–12.7)

## 2025-07-24 PROCEDURE — 0BND4ZZ RELEASE RIGHT MIDDLE LUNG LOBE, PERCUTANEOUS ENDOSCOPIC APPROACH: ICD-10-PCS | Performed by: STUDENT IN AN ORGANIZED HEALTH CARE EDUCATION/TRAINING PROGRAM

## 2025-07-24 PROCEDURE — 36415 COLL VENOUS BLD VENIPUNCTURE: CPT | Performed by: STUDENT IN AN ORGANIZED HEALTH CARE EDUCATION/TRAINING PROGRAM

## 2025-07-24 PROCEDURE — 71000016 HC POSTOP RECOV ADDL HR: Performed by: STUDENT IN AN ORGANIZED HEALTH CARE EDUCATION/TRAINING PROGRAM

## 2025-07-24 PROCEDURE — 63600175 PHARM REV CODE 636 W HCPCS: Performed by: NURSE ANESTHETIST, CERTIFIED REGISTERED

## 2025-07-24 PROCEDURE — 87176 TISSUE HOMOGENIZATION CULTR: CPT | Performed by: STUDENT IN AN ORGANIZED HEALTH CARE EDUCATION/TRAINING PROGRAM

## 2025-07-24 PROCEDURE — 71000033 HC RECOVERY, INTIAL HOUR: Performed by: STUDENT IN AN ORGANIZED HEALTH CARE EDUCATION/TRAINING PROGRAM

## 2025-07-24 PROCEDURE — 84100 ASSAY OF PHOSPHORUS: CPT

## 2025-07-24 PROCEDURE — 87116 MYCOBACTERIA CULTURE: CPT | Performed by: STUDENT IN AN ORGANIZED HEALTH CARE EDUCATION/TRAINING PROGRAM

## 2025-07-24 PROCEDURE — 3E0T3BZ INTRODUCTION OF ANESTHETIC AGENT INTO PERIPHERAL NERVES AND PLEXI, PERCUTANEOUS APPROACH: ICD-10-PCS | Performed by: STUDENT IN AN ORGANIZED HEALTH CARE EDUCATION/TRAINING PROGRAM

## 2025-07-24 PROCEDURE — 36000711: Performed by: STUDENT IN AN ORGANIZED HEALTH CARE EDUCATION/TRAINING PROGRAM

## 2025-07-24 PROCEDURE — 25000003 PHARM REV CODE 250

## 2025-07-24 PROCEDURE — P9045 ALBUMIN (HUMAN), 5%, 250 ML: HCPCS | Mod: JZ,TB | Performed by: NURSE ANESTHETIST, CERTIFIED REGISTERED

## 2025-07-24 PROCEDURE — 3E0L4GC INTRODUCTION OF OTHER THERAPEUTIC SUBSTANCE INTO PLEURAL CAVITY, PERCUTANEOUS ENDOSCOPIC APPROACH: ICD-10-PCS | Performed by: STUDENT IN AN ORGANIZED HEALTH CARE EDUCATION/TRAINING PROGRAM

## 2025-07-24 PROCEDURE — 37000009 HC ANESTHESIA EA ADD 15 MINS: Performed by: STUDENT IN AN ORGANIZED HEALTH CARE EDUCATION/TRAINING PROGRAM

## 2025-07-24 PROCEDURE — P9035 PLATELET PHERES LEUKOREDUCED: HCPCS

## 2025-07-24 PROCEDURE — 36430 TRANSFUSION BLD/BLD COMPNT: CPT | Performed by: STUDENT IN AN ORGANIZED HEALTH CARE EDUCATION/TRAINING PROGRAM

## 2025-07-24 PROCEDURE — 80053 COMPREHEN METABOLIC PANEL: CPT

## 2025-07-24 PROCEDURE — 20600001 HC STEP DOWN PRIVATE ROOM

## 2025-07-24 PROCEDURE — 25000003 PHARM REV CODE 250: Performed by: NURSE ANESTHETIST, CERTIFIED REGISTERED

## 2025-07-24 PROCEDURE — P9016 RBC LEUKOCYTES REDUCED: HCPCS | Performed by: STUDENT IN AN ORGANIZED HEALTH CARE EDUCATION/TRAINING PROGRAM

## 2025-07-24 PROCEDURE — 25000003 PHARM REV CODE 250: Performed by: ANESTHESIOLOGY

## 2025-07-24 PROCEDURE — 0BNF4ZZ RELEASE RIGHT LOWER LUNG LOBE, PERCUTANEOUS ENDOSCOPIC APPROACH: ICD-10-PCS | Performed by: STUDENT IN AN ORGANIZED HEALTH CARE EDUCATION/TRAINING PROGRAM

## 2025-07-24 PROCEDURE — 0W9930Z DRAINAGE OF RIGHT PLEURAL CAVITY WITH DRAINAGE DEVICE, PERCUTANEOUS APPROACH: ICD-10-PCS | Performed by: STUDENT IN AN ORGANIZED HEALTH CARE EDUCATION/TRAINING PROGRAM

## 2025-07-24 PROCEDURE — 30233N1 TRANSFUSION OF NONAUTOLOGOUS RED BLOOD CELLS INTO PERIPHERAL VEIN, PERCUTANEOUS APPROACH: ICD-10-PCS | Performed by: STUDENT IN AN ORGANIZED HEALTH CARE EDUCATION/TRAINING PROGRAM

## 2025-07-24 PROCEDURE — 86922 COMPATIBILITY TEST ANTIGLOB: CPT | Performed by: STUDENT IN AN ORGANIZED HEALTH CARE EDUCATION/TRAINING PROGRAM

## 2025-07-24 PROCEDURE — 87102 FUNGUS ISOLATION CULTURE: CPT | Performed by: STUDENT IN AN ORGANIZED HEALTH CARE EDUCATION/TRAINING PROGRAM

## 2025-07-24 PROCEDURE — 87070 CULTURE OTHR SPECIMN AEROBIC: CPT | Performed by: STUDENT IN AN ORGANIZED HEALTH CARE EDUCATION/TRAINING PROGRAM

## 2025-07-24 PROCEDURE — 63600175 PHARM REV CODE 636 W HCPCS

## 2025-07-24 PROCEDURE — 37000008 HC ANESTHESIA 1ST 15 MINUTES: Performed by: STUDENT IN AN ORGANIZED HEALTH CARE EDUCATION/TRAINING PROGRAM

## 2025-07-24 PROCEDURE — 36000710: Performed by: STUDENT IN AN ORGANIZED HEALTH CARE EDUCATION/TRAINING PROGRAM

## 2025-07-24 PROCEDURE — 27201423 OPTIME MED/SURG SUP & DEVICES STERILE SUPPLY: Performed by: STUDENT IN AN ORGANIZED HEALTH CARE EDUCATION/TRAINING PROGRAM

## 2025-07-24 PROCEDURE — 87075 CULTR BACTERIA EXCEPT BLOOD: CPT | Performed by: STUDENT IN AN ORGANIZED HEALTH CARE EDUCATION/TRAINING PROGRAM

## 2025-07-24 PROCEDURE — 94761 N-INVAS EAR/PLS OXIMETRY MLT: CPT

## 2025-07-24 PROCEDURE — C1729 CATH, DRAINAGE: HCPCS | Performed by: STUDENT IN AN ORGANIZED HEALTH CARE EDUCATION/TRAINING PROGRAM

## 2025-07-24 PROCEDURE — 85730 THROMBOPLASTIN TIME PARTIAL: CPT

## 2025-07-24 PROCEDURE — 0BNC4ZZ RELEASE RIGHT UPPER LUNG LOBE, PERCUTANEOUS ENDOSCOPIC APPROACH: ICD-10-PCS | Performed by: STUDENT IN AN ORGANIZED HEALTH CARE EDUCATION/TRAINING PROGRAM

## 2025-07-24 PROCEDURE — 83735 ASSAY OF MAGNESIUM: CPT

## 2025-07-24 PROCEDURE — 63600175 PHARM REV CODE 636 W HCPCS: Performed by: ANESTHESIOLOGY

## 2025-07-24 PROCEDURE — 85610 PROTHROMBIN TIME: CPT

## 2025-07-24 PROCEDURE — 32652 THORACOSCOPY REM TOTL CORTEX: CPT | Mod: RT,,, | Performed by: STUDENT IN AN ORGANIZED HEALTH CARE EDUCATION/TRAINING PROGRAM

## 2025-07-24 PROCEDURE — 63600175 PHARM REV CODE 636 W HCPCS: Performed by: STUDENT IN AN ORGANIZED HEALTH CARE EDUCATION/TRAINING PROGRAM

## 2025-07-24 PROCEDURE — 25000003 PHARM REV CODE 250: Performed by: STUDENT IN AN ORGANIZED HEALTH CARE EDUCATION/TRAINING PROGRAM

## 2025-07-24 PROCEDURE — 99232 SBSQ HOSP IP/OBS MODERATE 35: CPT | Mod: GC,,, | Performed by: INTERNAL MEDICINE

## 2025-07-24 PROCEDURE — 85025 COMPLETE CBC W/AUTO DIFF WBC: CPT | Performed by: STUDENT IN AN ORGANIZED HEALTH CARE EDUCATION/TRAINING PROGRAM

## 2025-07-24 PROCEDURE — 71000015 HC POSTOP RECOV 1ST HR: Performed by: STUDENT IN AN ORGANIZED HEALTH CARE EDUCATION/TRAINING PROGRAM

## 2025-07-24 PROCEDURE — 80053 COMPREHEN METABOLIC PANEL: CPT | Performed by: STUDENT IN AN ORGANIZED HEALTH CARE EDUCATION/TRAINING PROGRAM

## 2025-07-24 PROCEDURE — 85025 COMPLETE CBC W/AUTO DIFF WBC: CPT

## 2025-07-24 PROCEDURE — 87206 SMEAR FLUORESCENT/ACID STAI: CPT | Performed by: STUDENT IN AN ORGANIZED HEALTH CARE EDUCATION/TRAINING PROGRAM

## 2025-07-24 RX ORDER — ALBUMIN HUMAN 50 G/1000ML
SOLUTION INTRAVENOUS
Status: DISCONTINUED | OUTPATIENT
Start: 2025-07-24 | End: 2025-07-24

## 2025-07-24 RX ORDER — CALCIUM CHLORIDE DIHYDRATE 100 MG/ML
INJECTION, SOLUTION INTRAVENOUS
Status: DISCONTINUED | OUTPATIENT
Start: 2025-07-24 | End: 2025-07-24

## 2025-07-24 RX ORDER — FENTANYL CITRATE 50 UG/ML
INJECTION, SOLUTION INTRAMUSCULAR; INTRAVENOUS
Status: DISCONTINUED | OUTPATIENT
Start: 2025-07-24 | End: 2025-07-24

## 2025-07-24 RX ORDER — OXYCODONE AND ACETAMINOPHEN 5; 325 MG/1; MG/1
1 TABLET ORAL
Status: DISCONTINUED | OUTPATIENT
Start: 2025-07-24 | End: 2025-07-24 | Stop reason: HOSPADM

## 2025-07-24 RX ORDER — ROCURONIUM BROMIDE 10 MG/ML
INJECTION, SOLUTION INTRAVENOUS
Status: DISCONTINUED | OUTPATIENT
Start: 2025-07-24 | End: 2025-07-24

## 2025-07-24 RX ORDER — ACETAMINOPHEN 325 MG/1
650 TABLET ORAL EVERY 6 HOURS
Status: DISCONTINUED | OUTPATIENT
Start: 2025-07-25 | End: 2025-07-26

## 2025-07-24 RX ORDER — HYDROCODONE BITARTRATE AND ACETAMINOPHEN 500; 5 MG/1; MG/1
TABLET ORAL
Status: DISCONTINUED | OUTPATIENT
Start: 2025-07-24 | End: 2025-07-28

## 2025-07-24 RX ORDER — MIDAZOLAM HYDROCHLORIDE 1 MG/ML
INJECTION INTRAMUSCULAR; INTRAVENOUS
Status: DISCONTINUED | OUTPATIENT
Start: 2025-07-24 | End: 2025-07-24

## 2025-07-24 RX ORDER — PHENYLEPHRINE HYDROCHLORIDE 10 MG/ML
INJECTION INTRAVENOUS
Status: DISCONTINUED | OUTPATIENT
Start: 2025-07-24 | End: 2025-07-24

## 2025-07-24 RX ORDER — LIDOCAINE HYDROCHLORIDE 20 MG/ML
INJECTION, SOLUTION EPIDURAL; INFILTRATION; INTRACAUDAL; PERINEURAL
Status: DISCONTINUED | OUTPATIENT
Start: 2025-07-24 | End: 2025-07-24

## 2025-07-24 RX ORDER — HYDROMORPHONE HYDROCHLORIDE 1 MG/ML
INJECTION, SOLUTION INTRAMUSCULAR; INTRAVENOUS; SUBCUTANEOUS
Status: DISCONTINUED | OUTPATIENT
Start: 2025-07-24 | End: 2025-07-24

## 2025-07-24 RX ORDER — GABAPENTIN 100 MG/1
300 CAPSULE ORAL 3 TIMES DAILY
Status: DISCONTINUED | OUTPATIENT
Start: 2025-07-24 | End: 2025-07-25

## 2025-07-24 RX ORDER — HYDROMORPHONE HYDROCHLORIDE 1 MG/ML
0.2 INJECTION, SOLUTION INTRAMUSCULAR; INTRAVENOUS; SUBCUTANEOUS EVERY 10 MIN PRN
Status: DISCONTINUED | OUTPATIENT
Start: 2025-07-24 | End: 2025-07-24 | Stop reason: HOSPADM

## 2025-07-24 RX ORDER — HALOPERIDOL LACTATE 5 MG/ML
0.5 INJECTION, SOLUTION INTRAMUSCULAR EVERY 10 MIN PRN
Status: DISCONTINUED | OUTPATIENT
Start: 2025-07-24 | End: 2025-07-24 | Stop reason: HOSPADM

## 2025-07-24 RX ORDER — GLUCAGON 1 MG
1 KIT INJECTION
Status: DISCONTINUED | OUTPATIENT
Start: 2025-07-24 | End: 2025-07-24 | Stop reason: HOSPADM

## 2025-07-24 RX ORDER — METHOCARBAMOL 500 MG/1
500 TABLET, FILM COATED ORAL 4 TIMES DAILY
Status: DISCONTINUED | OUTPATIENT
Start: 2025-07-24 | End: 2025-07-25

## 2025-07-24 RX ORDER — METOPROLOL TARTRATE 25 MG/1
12.5 TABLET ORAL 2 TIMES DAILY
Status: DISCONTINUED | OUTPATIENT
Start: 2025-07-24 | End: 2025-08-02

## 2025-07-24 RX ORDER — DEXAMETHASONE SODIUM PHOSPHATE 4 MG/ML
INJECTION, SOLUTION INTRA-ARTICULAR; INTRALESIONAL; INTRAMUSCULAR; INTRAVENOUS; SOFT TISSUE
Status: DISCONTINUED | OUTPATIENT
Start: 2025-07-24 | End: 2025-07-24

## 2025-07-24 RX ORDER — BUPIVACAINE HYDROCHLORIDE 2.5 MG/ML
INJECTION, SOLUTION EPIDURAL; INFILTRATION; INTRACAUDAL; PERINEURAL
Status: DISCONTINUED | OUTPATIENT
Start: 2025-07-24 | End: 2025-07-24 | Stop reason: HOSPADM

## 2025-07-24 RX ORDER — PROPOFOL 10 MG/ML
VIAL (ML) INTRAVENOUS
Status: DISCONTINUED | OUTPATIENT
Start: 2025-07-24 | End: 2025-07-24

## 2025-07-24 RX ORDER — MUPIROCIN 20 MG/G
OINTMENT TOPICAL 2 TIMES DAILY
Status: DISPENSED | OUTPATIENT
Start: 2025-07-24 | End: 2025-07-29

## 2025-07-24 RX ADMIN — VALACYCLOVIR HYDROCHLORIDE 500 MG: 500 TABLET, FILM COATED ORAL at 08:07

## 2025-07-24 RX ADMIN — PHENYLEPHRINE HYDROCHLORIDE 100 MCG: 10 INJECTION INTRAVENOUS at 06:07

## 2025-07-24 RX ADMIN — FENTANYL CITRATE 100 MCG: 0.05 INJECTION, SOLUTION INTRAMUSCULAR; INTRAVENOUS at 05:07

## 2025-07-24 RX ADMIN — OXYCODONE HYDROCHLORIDE AND ACETAMINOPHEN 1 TABLET: 5; 325 TABLET ORAL at 09:07

## 2025-07-24 RX ADMIN — FENTANYL CITRATE 100 MCG: 0.05 INJECTION, SOLUTION INTRAMUSCULAR; INTRAVENOUS at 04:07

## 2025-07-24 RX ADMIN — PROPOFOL 150 MG: 10 INJECTION, EMULSION INTRAVENOUS at 04:07

## 2025-07-24 RX ADMIN — ALBUMIN (HUMAN) 500 ML: 12.5 SOLUTION INTRAVENOUS at 07:07

## 2025-07-24 RX ADMIN — OXYCODONE HYDROCHLORIDE 30 MG: 10 TABLET ORAL at 05:07

## 2025-07-24 RX ADMIN — TENOFOVIR ALAFENAMIDE 25 MG: 25 TABLET ORAL at 05:07

## 2025-07-24 RX ADMIN — SIROLIMUS 1 MG: 1 TABLET ORAL at 05:07

## 2025-07-24 RX ADMIN — PHENYLEPHRINE HYDROCHLORIDE 200 MCG: 10 INJECTION INTRAVENOUS at 07:07

## 2025-07-24 RX ADMIN — ROCURONIUM BROMIDE 20 MG: 10 INJECTION, SOLUTION INTRAVENOUS at 06:07

## 2025-07-24 RX ADMIN — LIDOCAINE HYDROCHLORIDE 100 MG: 20 INJECTION, SOLUTION EPIDURAL; INFILTRATION; INTRACAUDAL; PERINEURAL at 04:07

## 2025-07-24 RX ADMIN — ONDANSETRON 0.5 G: 2 INJECTION INTRAMUSCULAR; INTRAVENOUS at 07:07

## 2025-07-24 RX ADMIN — HYDROMORPHONE HYDROCHLORIDE 0.2 MG: 1 INJECTION, SOLUTION INTRAMUSCULAR; INTRAVENOUS; SUBCUTANEOUS at 08:07

## 2025-07-24 RX ADMIN — ONDANSETRON 0.5 G: 2 INJECTION INTRAMUSCULAR; INTRAVENOUS at 06:07

## 2025-07-24 RX ADMIN — PHENYLEPHRINE HYDROCHLORIDE 100 MCG: 10 INJECTION INTRAVENOUS at 05:07

## 2025-07-24 RX ADMIN — DEXAMETHASONE SODIUM PHOSPHATE 4 MG: 4 INJECTION, SOLUTION INTRAMUSCULAR; INTRAVENOUS at 06:07

## 2025-07-24 RX ADMIN — ONDANSETRON 4 MG: 2 INJECTION INTRAMUSCULAR; INTRAVENOUS at 07:07

## 2025-07-24 RX ADMIN — GABAPENTIN 300 MG: 300 CAPSULE ORAL at 09:07

## 2025-07-24 RX ADMIN — MUPIROCIN: 20 OINTMENT TOPICAL at 09:07

## 2025-07-24 RX ADMIN — LACTULOSE 10 G: 20 SOLUTION ORAL at 08:07

## 2025-07-24 RX ADMIN — ROCURONIUM BROMIDE 10 MG: 10 INJECTION, SOLUTION INTRAVENOUS at 06:07

## 2025-07-24 RX ADMIN — HYDROMORPHONE HYDROCHLORIDE 0.2 MG: 1 INJECTION, SOLUTION INTRAMUSCULAR; INTRAVENOUS; SUBCUTANEOUS at 09:07

## 2025-07-24 RX ADMIN — PHENYLEPHRINE HYDROCHLORIDE 100 MCG: 10 INJECTION INTRAVENOUS at 07:07

## 2025-07-24 RX ADMIN — METOPROLOL TARTRATE 12.5 MG: 25 TABLET, FILM COATED ORAL at 09:07

## 2025-07-24 RX ADMIN — DEXTROSE 2 G: 50 INJECTION, SOLUTION INTRAVENOUS at 05:07

## 2025-07-24 RX ADMIN — METHOCARBAMOL 500 MG: 500 TABLET ORAL at 09:07

## 2025-07-24 RX ADMIN — HYDROMORPHONE HYDROCHLORIDE 0.5 MG: 1 INJECTION, SOLUTION INTRAMUSCULAR; INTRAVENOUS; SUBCUTANEOUS at 08:07

## 2025-07-24 RX ADMIN — TORSEMIDE 20 MG: 20 TABLET ORAL at 08:07

## 2025-07-24 RX ADMIN — ROCURONIUM BROMIDE 70 MG: 10 INJECTION, SOLUTION INTRAVENOUS at 04:07

## 2025-07-24 RX ADMIN — MIDAZOLAM HYDROCHLORIDE 2 MG: 1 INJECTION, SOLUTION INTRAMUSCULAR; INTRAVENOUS at 04:07

## 2025-07-24 RX ADMIN — SODIUM CHLORIDE: 0.9 INJECTION, SOLUTION INTRAVENOUS at 04:07

## 2025-07-24 RX ADMIN — SODIUM CHLORIDE, SODIUM GLUCONATE, SODIUM ACETATE, POTASSIUM CHLORIDE AND MAGNESIUM CHLORIDE: 526; 502; 368; 37; 30 INJECTION, SOLUTION INTRAVENOUS at 04:07

## 2025-07-24 NOTE — ANESTHESIA PROCEDURE NOTES
Intubation    Date/Time: 7/24/2025 4:46 PM    Performed by: Carson Amezcua CRNA  Authorized by: Anne Bosch MD    Intubation:     Induction:  Intravenous    Intubated:  Postinduction    Mask Ventilation:  Easy mask    Attempts:  1    Attempted By:  CRNA    Blade:  Moore 2    Laryngeal View Grade: Grade I - full view of cords      Difficult Airway Encountered?: No      Complications:  None    Airway Device:  Double lumen tube left    Airway Device Size:  39F    Style/Cuff Inflation:  Cuffed (inflated to minimal occlusive pressure)    Tube secured:  27    Secured at:  The lips    Placement Verified By:  Capnometry    Complicating Factors:  None    Findings Post-Intubation:  BS equal bilateral

## 2025-07-24 NOTE — TELEPHONE ENCOUNTER
Communicated with patient via patient portal:        Good morning Mr. Diana,  Your last labs that were done outpatient were stable.  Dr. Morgan would like you to decrease your Rapamune to 0.5mg daily from 1 mg daily.  I will communicate this to your inpatient hospital team. Get well soon.   Melony        ----- Message from Laura Morgan MD sent at 7/23/2025  3:47 PM CDT -----  Sirolimus remains supratherapeutic. Recommend decreasing to 0.5mg qd from 1mg qd. Repeat labs in 1 week. Thank you.  ----- Message -----  From: Lubna Fine RN  Sent: 7/18/2025  10:50 AM CDT  To: Laura Morgan MD

## 2025-07-24 NOTE — ANESTHESIA PROCEDURE NOTES
Arterial    Diagnosis: surgery    Patient location during procedure: done in OR    Staffing  Authorizing Provider: Anne Bosch MD  Performing Provider: Anne Bosch MD    Staffing  Performed by: Anne Bosch MD  Authorized by: Anne Bosch MD    Anesthesiologist was present at the time of the procedure.    Preanesthetic Checklist  Completed: patient identified, IV checked, site marked, risks and benefits discussed, surgical consent, monitors and equipment checked, pre-op evaluation, timeout performed and anesthesia consent givenArterial  Skin Prep: chlorhexidine gluconate  Local Infiltration: lidocaine  Orientation: right  Location: radial    Catheter Size: 20 G  Catheter placement by Anatomical landmarks. Heme positive aspiration all ports. Insertion Attempts: 1  Assessment  Dressing: secured with tape and tegaderm  Patient: Tolerated well

## 2025-07-25 LAB
ABSOLUTE EOSINOPHIL (OHS): 0 K/UL
ABSOLUTE MONOCYTE (OHS): 0.11 K/UL (ref 0.3–1)
ABSOLUTE NEUTROPHIL COUNT (OHS): 3.36 K/UL (ref 1.8–7.7)
ACID FAST MOD KINY STN SPEC: NORMAL
ALBUMIN SERPL BCP-MCNC: 2.6 G/DL (ref 3.5–5.2)
ALP SERPL-CCNC: 163 UNIT/L (ref 40–150)
ALT SERPL W/O P-5'-P-CCNC: 31 UNIT/L (ref 0–55)
ANION GAP (OHS): 14 MMOL/L (ref 8–16)
AST SERPL-CCNC: 119 UNIT/L (ref 0–50)
BASOPHILS # BLD AUTO: 0.01 K/UL
BASOPHILS NFR BLD AUTO: 0.2 %
BILIRUB SERPL-MCNC: 7.5 MG/DL (ref 0.1–1)
BUN SERPL-MCNC: 24 MG/DL (ref 6–20)
CALCIUM SERPL-MCNC: 8.8 MG/DL (ref 8.7–10.5)
CHLORIDE SERPL-SCNC: 103 MMOL/L (ref 95–110)
CO2 SERPL-SCNC: 21 MMOL/L (ref 23–29)
CREAT SERPL-MCNC: 1.6 MG/DL (ref 0.5–1.4)
ERYTHROCYTE [DISTWIDTH] IN BLOOD BY AUTOMATED COUNT: 16.7 % (ref 11.5–14.5)
GFR SERPLBLD CREATININE-BSD FMLA CKD-EPI: 57 ML/MIN/1.73/M2
GLUCOSE SERPL-MCNC: 180 MG/DL (ref 70–110)
GLUCOSE SERPL-MCNC: 90 MG/DL (ref 70–110)
HCO3 UR-SCNC: 26.5 MMOL/L (ref 24–28)
HCT VFR BLD AUTO: 32.6 % (ref 40–54)
HCT VFR BLD CALC: 20 %PCV (ref 36–54)
HGB BLD-MCNC: 11.1 GM/DL (ref 14–18)
IMM GRANULOCYTES # BLD AUTO: 0.02 K/UL (ref 0–0.04)
IMM GRANULOCYTES NFR BLD AUTO: 0.5 % (ref 0–0.5)
LACTATE SERPL-SCNC: 1.5 MMOL/L (ref 0.5–2.2)
LACTATE SERPL-SCNC: 4.1 MMOL/L (ref 0.5–2.2)
LACTATE SERPL-SCNC: 5.4 MMOL/L (ref 0.5–2.2)
LYMPHOCYTES # BLD AUTO: 0.57 K/UL (ref 1–4.8)
MCH RBC QN AUTO: 33 PG (ref 27–31)
MCHC RBC AUTO-ENTMCNC: 34 G/DL (ref 32–36)
MCV RBC AUTO: 97 FL (ref 82–98)
NUCLEATED RBC (/100WBC) (OHS): 0 /100 WBC
PCO2 BLDA: 34 MMHG (ref 35–45)
PH SMN: 7.5 [PH] (ref 7.35–7.45)
PLATELET # BLD AUTO: 98 K/UL (ref 150–450)
PLATELET BLD QL SMEAR: ABNORMAL
PMV BLD AUTO: 9.8 FL (ref 9.2–12.9)
PO2 BLDA: 304 MMHG (ref 80–100)
POC BE: 3 MMOL/L (ref -2–2)
POC IONIZED CALCIUM: 1 MMOL/L (ref 1.06–1.42)
POC SATURATED O2: 100 % (ref 95–100)
POC TCO2: 28 MMOL/L (ref 23–27)
POTASSIUM BLD-SCNC: 3.1 MMOL/L (ref 3.5–5.1)
POTASSIUM SERPL-SCNC: 4.1 MMOL/L (ref 3.5–5.1)
PROT SERPL-MCNC: 7 GM/DL (ref 6–8.4)
RBC # BLD AUTO: 3.36 M/UL (ref 4.6–6.2)
RELATIVE EOSINOPHIL (OHS): 0 %
RELATIVE LYMPHOCYTE (OHS): 14 % (ref 18–48)
RELATIVE MONOCYTE (OHS): 2.7 % (ref 4–15)
RELATIVE NEUTROPHIL (OHS): 82.6 % (ref 38–73)
SAMPLE: ABNORMAL
SODIUM BLD-SCNC: 140 MMOL/L (ref 136–145)
SODIUM SERPL-SCNC: 138 MMOL/L (ref 136–145)
WBC # BLD AUTO: 4.07 K/UL (ref 3.9–12.7)

## 2025-07-25 PROCEDURE — 20600001 HC STEP DOWN PRIVATE ROOM

## 2025-07-25 PROCEDURE — 83605 ASSAY OF LACTIC ACID: CPT

## 2025-07-25 PROCEDURE — 25000003 PHARM REV CODE 250

## 2025-07-25 PROCEDURE — 99900035 HC TECH TIME PER 15 MIN (STAT)

## 2025-07-25 PROCEDURE — 63600175 PHARM REV CODE 636 W HCPCS

## 2025-07-25 PROCEDURE — 36415 COLL VENOUS BLD VENIPUNCTURE: CPT

## 2025-07-25 PROCEDURE — 63600175 PHARM REV CODE 636 W HCPCS: Performed by: STUDENT IN AN ORGANIZED HEALTH CARE EDUCATION/TRAINING PROGRAM

## 2025-07-25 PROCEDURE — 80053 COMPREHEN METABOLIC PANEL: CPT

## 2025-07-25 PROCEDURE — 25000003 PHARM REV CODE 250: Performed by: STUDENT IN AN ORGANIZED HEALTH CARE EDUCATION/TRAINING PROGRAM

## 2025-07-25 PROCEDURE — 94761 N-INVAS EAR/PLS OXIMETRY MLT: CPT

## 2025-07-25 PROCEDURE — 85025 COMPLETE CBC W/AUTO DIFF WBC: CPT

## 2025-07-25 PROCEDURE — 99024 POSTOP FOLLOW-UP VISIT: CPT | Mod: ,,, | Performed by: STUDENT IN AN ORGANIZED HEALTH CARE EDUCATION/TRAINING PROGRAM

## 2025-07-25 PROCEDURE — 94799 UNLISTED PULMONARY SVC/PX: CPT

## 2025-07-25 RX ORDER — METHOCARBAMOL 750 MG/1
750 TABLET, FILM COATED ORAL 4 TIMES DAILY
Status: DISCONTINUED | OUTPATIENT
Start: 2025-07-25 | End: 2025-07-30

## 2025-07-25 RX ORDER — GABAPENTIN 400 MG/1
400 CAPSULE ORAL 3 TIMES DAILY
Status: DISCONTINUED | OUTPATIENT
Start: 2025-07-25 | End: 2025-07-31

## 2025-07-25 RX ORDER — LIDOCAINE 50 MG/G
3 PATCH TOPICAL
Status: DISCONTINUED | OUTPATIENT
Start: 2025-07-25 | End: 2025-08-05 | Stop reason: HOSPADM

## 2025-07-25 RX ORDER — HYDROMORPHONE HYDROCHLORIDE 1 MG/ML
0.5 INJECTION, SOLUTION INTRAMUSCULAR; INTRAVENOUS; SUBCUTANEOUS EVERY 6 HOURS PRN
Status: DISCONTINUED | OUTPATIENT
Start: 2025-07-25 | End: 2025-07-25

## 2025-07-25 RX ORDER — HYDROMORPHONE HYDROCHLORIDE 1 MG/ML
0.5 INJECTION, SOLUTION INTRAMUSCULAR; INTRAVENOUS; SUBCUTANEOUS EVERY 4 HOURS PRN
Status: DISCONTINUED | OUTPATIENT
Start: 2025-07-25 | End: 2025-07-26

## 2025-07-25 RX ADMIN — DIAZEPAM 5 MG: 5 TABLET ORAL at 11:07

## 2025-07-25 RX ADMIN — HYDROMORPHONE HYDROCHLORIDE 0.5 MG: 1 INJECTION, SOLUTION INTRAMUSCULAR; INTRAVENOUS; SUBCUTANEOUS at 11:07

## 2025-07-25 RX ADMIN — HYDROMORPHONE HYDROCHLORIDE 0.5 MG: 1 INJECTION, SOLUTION INTRAMUSCULAR; INTRAVENOUS; SUBCUTANEOUS at 05:07

## 2025-07-25 RX ADMIN — MUPIROCIN: 20 OINTMENT TOPICAL at 08:07

## 2025-07-25 RX ADMIN — LACTULOSE 10 G: 20 SOLUTION ORAL at 08:07

## 2025-07-25 RX ADMIN — DIAZEPAM 5 MG: 5 TABLET ORAL at 02:07

## 2025-07-25 RX ADMIN — METOPROLOL TARTRATE 12.5 MG: 25 TABLET, FILM COATED ORAL at 08:07

## 2025-07-25 RX ADMIN — TENOFOVIR ALAFENAMIDE 25 MG: 25 TABLET ORAL at 08:07

## 2025-07-25 RX ADMIN — GABAPENTIN 300 MG: 300 CAPSULE ORAL at 08:07

## 2025-07-25 RX ADMIN — GABAPENTIN 400 MG: 400 CAPSULE ORAL at 08:07

## 2025-07-25 RX ADMIN — METHOCARBAMOL 500 MG: 500 TABLET ORAL at 12:07

## 2025-07-25 RX ADMIN — LIDOCAINE 3 PATCH: 50 PATCH CUTANEOUS at 05:07

## 2025-07-25 RX ADMIN — OXYCODONE HYDROCHLORIDE 30 MG: 10 TABLET ORAL at 08:07

## 2025-07-25 RX ADMIN — OXYCODONE HYDROCHLORIDE 30 MG: 10 TABLET ORAL at 02:07

## 2025-07-25 RX ADMIN — METHOCARBAMOL 750 MG: 750 TABLET ORAL at 05:07

## 2025-07-25 RX ADMIN — METHOCARBAMOL 500 MG: 500 TABLET ORAL at 08:07

## 2025-07-25 RX ADMIN — HYDROMORPHONE HYDROCHLORIDE 0.5 MG: 1 INJECTION, SOLUTION INTRAMUSCULAR; INTRAVENOUS; SUBCUTANEOUS at 09:07

## 2025-07-25 RX ADMIN — VALACYCLOVIR HYDROCHLORIDE 500 MG: 500 TABLET, FILM COATED ORAL at 08:07

## 2025-07-25 RX ADMIN — GABAPENTIN 300 MG: 300 CAPSULE ORAL at 02:07

## 2025-07-25 RX ADMIN — SIROLIMUS 1 MG: 1 TABLET ORAL at 08:07

## 2025-07-25 RX ADMIN — METHOCARBAMOL 750 MG: 750 TABLET ORAL at 08:07

## 2025-07-25 NOTE — ANESTHESIA POSTPROCEDURE EVALUATION
Anesthesia Post Evaluation    Patient: Jhonny Diana    Procedure(s) Performed: Procedure(s) (LRB):  VATS (VIDEO-ASSISTED THORACOSCOPIC SURGERY) (Right)  VATS, WITH DECORTICATION, LUNG (Right)    Final Anesthesia Type: general      Patient location during evaluation: PACU  Patient participation: Yes- Able to Participate  Level of consciousness: awake and alert  Post-procedure vital signs: reviewed and stable  Pain management: adequate  Airway patency: patent    PONV status at discharge: No PONV  Anesthetic complications: no      Cardiovascular status: blood pressure returned to baseline  Respiratory status: unassisted  Hydration status: euvolemic  Follow-up not needed.              Vitals Value Taken Time   /77 07/24/25 21:47   Temp 36.6 °C (97.9 °F) 07/24/25 21:30   Pulse 66 07/24/25 22:03   Resp 10 07/24/25 22:02   SpO2 98 % 07/24/25 22:03   Vitals shown include unfiled device data.      No case tracking events are documented in the log.      Pain/Diogenes Score: Pain Rating Prior to Med Admin: 8 (7/24/2025  9:07 PM)  Pain Rating Post Med Admin: 0 (7/24/2025  9:50 PM)  Diogenes Score: 9 (7/24/2025  8:40 PM)

## 2025-07-25 NOTE — TRANSFER OF CARE
"Anesthesia Transfer of Care Note    Patient: Jhonny Diana    Procedure(s) Performed: Procedure(s) (LRB):  VATS (VIDEO-ASSISTED THORACOSCOPIC SURGERY) (Right)  VATS, WITH DECORTICATION, LUNG (Right)    Patient location: PACU    Anesthesia Type: general    Transport from OR: Transported from OR on 6-10 L/min O2 by face mask with adequate spontaneous ventilation    Post pain: adequate analgesia    Post assessment: no apparent anesthetic complications and tolerated procedure well    Post vital signs: stable    Level of consciousness: awake and alert    Nausea/Vomiting: no nausea/vomiting    Complications: none    Transfer of care protocol was followed    Last vitals: Visit Vitals  BP (!) 112/58   Pulse 76   Temp 37.2 °C (99 °F) (Temporal)   Resp 14   Ht 5' 6" (1.676 m)   Wt 71.8 kg (158 lb 4.6 oz)   SpO2 98%   BMI 25.55 kg/m²     "

## 2025-07-26 LAB
ABSOLUTE EOSINOPHIL (OHS): 0.23 K/UL
ABSOLUTE MONOCYTE (OHS): 1.17 K/UL (ref 0.3–1)
ABSOLUTE NEUTROPHIL COUNT (OHS): 5.91 K/UL (ref 1.8–7.7)
ALBUMIN SERPL BCP-MCNC: 2.4 G/DL (ref 3.5–5.2)
ALP SERPL-CCNC: 151 UNIT/L (ref 40–150)
ALT SERPL W/O P-5'-P-CCNC: 24 UNIT/L (ref 0–55)
ANION GAP (OHS): 7 MMOL/L (ref 8–16)
AST SERPL-CCNC: 101 UNIT/L (ref 0–50)
BASOPHILS # BLD AUTO: 0.04 K/UL
BASOPHILS NFR BLD AUTO: 0.4 %
BILIRUB SERPL-MCNC: 5.6 MG/DL (ref 0.1–1)
BUN SERPL-MCNC: 31 MG/DL (ref 6–20)
CALCIUM SERPL-MCNC: 8 MG/DL (ref 8.7–10.5)
CHLORIDE SERPL-SCNC: 102 MMOL/L (ref 95–110)
CO2 SERPL-SCNC: 27 MMOL/L (ref 23–29)
CREAT SERPL-MCNC: 1.9 MG/DL (ref 0.5–1.4)
ERYTHROCYTE [DISTWIDTH] IN BLOOD BY AUTOMATED COUNT: 17.2 % (ref 11.5–14.5)
GFR SERPLBLD CREATININE-BSD FMLA CKD-EPI: 46 ML/MIN/1.73/M2
GLUCOSE SERPL-MCNC: 116 MG/DL (ref 70–110)
HCT VFR BLD AUTO: 27.9 % (ref 40–54)
HGB BLD-MCNC: 9.5 GM/DL (ref 14–18)
IMM GRANULOCYTES # BLD AUTO: 0.04 K/UL (ref 0–0.04)
IMM GRANULOCYTES NFR BLD AUTO: 0.4 % (ref 0–0.5)
LYMPHOCYTES # BLD AUTO: 2.27 K/UL (ref 1–4.8)
MCH RBC QN AUTO: 33 PG (ref 27–31)
MCHC RBC AUTO-ENTMCNC: 34.1 G/DL (ref 32–36)
MCV RBC AUTO: 97 FL (ref 82–98)
NUCLEATED RBC (/100WBC) (OHS): 0 /100 WBC
PLATELET # BLD AUTO: 109 K/UL (ref 150–450)
PMV BLD AUTO: 9.7 FL (ref 9.2–12.9)
POTASSIUM SERPL-SCNC: 3.6 MMOL/L (ref 3.5–5.1)
PROT SERPL-MCNC: 6.5 GM/DL (ref 6–8.4)
RBC # BLD AUTO: 2.88 M/UL (ref 4.6–6.2)
RELATIVE EOSINOPHIL (OHS): 2.4 %
RELATIVE LYMPHOCYTE (OHS): 23.5 % (ref 18–48)
RELATIVE MONOCYTE (OHS): 12.1 % (ref 4–15)
RELATIVE NEUTROPHIL (OHS): 61.2 % (ref 38–73)
SODIUM SERPL-SCNC: 136 MMOL/L (ref 136–145)
WBC # BLD AUTO: 9.66 K/UL (ref 3.9–12.7)

## 2025-07-26 PROCEDURE — 20600001 HC STEP DOWN PRIVATE ROOM

## 2025-07-26 PROCEDURE — 99024 POSTOP FOLLOW-UP VISIT: CPT | Mod: ,,, | Performed by: STUDENT IN AN ORGANIZED HEALTH CARE EDUCATION/TRAINING PROGRAM

## 2025-07-26 PROCEDURE — 97165 OT EVAL LOW COMPLEX 30 MIN: CPT

## 2025-07-26 PROCEDURE — 85025 COMPLETE CBC W/AUTO DIFF WBC: CPT

## 2025-07-26 PROCEDURE — 94799 UNLISTED PULMONARY SVC/PX: CPT

## 2025-07-26 PROCEDURE — 25000003 PHARM REV CODE 250

## 2025-07-26 PROCEDURE — 63600175 PHARM REV CODE 636 W HCPCS: Performed by: STUDENT IN AN ORGANIZED HEALTH CARE EDUCATION/TRAINING PROGRAM

## 2025-07-26 PROCEDURE — 63600175 PHARM REV CODE 636 W HCPCS

## 2025-07-26 PROCEDURE — 99900035 HC TECH TIME PER 15 MIN (STAT)

## 2025-07-26 PROCEDURE — 36415 COLL VENOUS BLD VENIPUNCTURE: CPT

## 2025-07-26 PROCEDURE — 80053 COMPREHEN METABOLIC PANEL: CPT

## 2025-07-26 PROCEDURE — 94761 N-INVAS EAR/PLS OXIMETRY MLT: CPT

## 2025-07-26 PROCEDURE — 97535 SELF CARE MNGMENT TRAINING: CPT

## 2025-07-26 RX ORDER — HYDROMORPHONE HYDROCHLORIDE 1 MG/ML
1 INJECTION, SOLUTION INTRAMUSCULAR; INTRAVENOUS; SUBCUTANEOUS EVERY 4 HOURS PRN
Status: DISCONTINUED | OUTPATIENT
Start: 2025-07-26 | End: 2025-07-26

## 2025-07-26 RX ORDER — HYDROMORPHONE HYDROCHLORIDE 1 MG/ML
1.5 INJECTION, SOLUTION INTRAMUSCULAR; INTRAVENOUS; SUBCUTANEOUS EVERY 4 HOURS PRN
Status: DISCONTINUED | OUTPATIENT
Start: 2025-07-26 | End: 2025-08-05 | Stop reason: HOSPADM

## 2025-07-26 RX ORDER — ACETAMINOPHEN 500 MG
1000 TABLET ORAL EVERY 8 HOURS
Status: DISCONTINUED | OUTPATIENT
Start: 2025-07-26 | End: 2025-07-30

## 2025-07-26 RX ADMIN — METHOCARBAMOL 750 MG: 750 TABLET ORAL at 05:07

## 2025-07-26 RX ADMIN — LIDOCAINE 3 PATCH: 50 PATCH CUTANEOUS at 05:07

## 2025-07-26 RX ADMIN — OXYCODONE HYDROCHLORIDE 30 MG: 10 TABLET ORAL at 02:07

## 2025-07-26 RX ADMIN — OXYCODONE HYDROCHLORIDE 30 MG: 10 TABLET ORAL at 11:07

## 2025-07-26 RX ADMIN — VALACYCLOVIR HYDROCHLORIDE 500 MG: 500 TABLET, FILM COATED ORAL at 08:07

## 2025-07-26 RX ADMIN — HYDROMORPHONE HYDROCHLORIDE 1 MG: 1 INJECTION, SOLUTION INTRAMUSCULAR; INTRAVENOUS; SUBCUTANEOUS at 11:07

## 2025-07-26 RX ADMIN — GABAPENTIN 400 MG: 400 CAPSULE ORAL at 08:07

## 2025-07-26 RX ADMIN — METOPROLOL TARTRATE 12.5 MG: 25 TABLET, FILM COATED ORAL at 08:07

## 2025-07-26 RX ADMIN — OXYCODONE HYDROCHLORIDE 30 MG: 10 TABLET ORAL at 05:07

## 2025-07-26 RX ADMIN — GABAPENTIN 400 MG: 400 CAPSULE ORAL at 01:07

## 2025-07-26 RX ADMIN — HYDROMORPHONE HYDROCHLORIDE 0.5 MG: 1 INJECTION, SOLUTION INTRAMUSCULAR; INTRAVENOUS; SUBCUTANEOUS at 01:07

## 2025-07-26 RX ADMIN — MUPIROCIN: 20 OINTMENT TOPICAL at 08:07

## 2025-07-26 RX ADMIN — HYDROMORPHONE HYDROCHLORIDE 0.5 MG: 1 INJECTION, SOLUTION INTRAMUSCULAR; INTRAVENOUS; SUBCUTANEOUS at 05:07

## 2025-07-26 RX ADMIN — HYDROMORPHONE HYDROCHLORIDE 1.5 MG: 1 INJECTION, SOLUTION INTRAMUSCULAR; INTRAVENOUS; SUBCUTANEOUS at 07:07

## 2025-07-26 RX ADMIN — METHOCARBAMOL 750 MG: 750 TABLET ORAL at 01:07

## 2025-07-26 RX ADMIN — HYDROMORPHONE HYDROCHLORIDE 1.5 MG: 1 INJECTION, SOLUTION INTRAMUSCULAR; INTRAVENOUS; SUBCUTANEOUS at 02:07

## 2025-07-26 RX ADMIN — SIROLIMUS 1 MG: 1 TABLET ORAL at 06:07

## 2025-07-26 RX ADMIN — HYDROMORPHONE HYDROCHLORIDE 1.5 MG: 1 INJECTION, SOLUTION INTRAMUSCULAR; INTRAVENOUS; SUBCUTANEOUS at 11:07

## 2025-07-26 RX ADMIN — TENOFOVIR ALAFENAMIDE 25 MG: 25 TABLET ORAL at 06:07

## 2025-07-26 RX ADMIN — TORSEMIDE 20 MG: 20 TABLET ORAL at 08:07

## 2025-07-26 RX ADMIN — DIAZEPAM 5 MG: 5 TABLET ORAL at 11:07

## 2025-07-26 RX ADMIN — METHOCARBAMOL 750 MG: 750 TABLET ORAL at 08:07

## 2025-07-26 RX ADMIN — OXYCODONE HYDROCHLORIDE 30 MG: 10 TABLET ORAL at 08:07

## 2025-07-26 RX ADMIN — ACETAMINOPHEN 650 MG: 325 TABLET ORAL at 11:07

## 2025-07-27 LAB
ABO + RH BLD: NORMAL
ABO + RH BLD: NORMAL
ABSOLUTE EOSINOPHIL (OHS): 0.15 K/UL
ABSOLUTE MONOCYTE (OHS): 0.6 K/UL (ref 0.3–1)
ABSOLUTE NEUTROPHIL COUNT (OHS): 2.71 K/UL (ref 1.8–7.7)
ALBUMIN SERPL BCP-MCNC: 2.2 G/DL (ref 3.5–5.2)
ALP SERPL-CCNC: 152 UNIT/L (ref 40–150)
ALT SERPL W/O P-5'-P-CCNC: 19 UNIT/L (ref 0–55)
ANION GAP (OHS): 8 MMOL/L (ref 8–16)
AST SERPL-CCNC: 85 UNIT/L (ref 0–50)
BACTERIA SPEC ANAEROBE CULT: NORMAL
BASOPHILS # BLD AUTO: 0.02 K/UL
BASOPHILS NFR BLD AUTO: 0.4 %
BILIRUB SERPL-MCNC: 5.6 MG/DL (ref 0.1–1)
BLD PROD TYP BPU: NORMAL
BLD PROD TYP BPU: NORMAL
BLOOD UNIT EXPIRATION DATE: NORMAL
BLOOD UNIT EXPIRATION DATE: NORMAL
BLOOD UNIT TYPE CODE: 5100
BLOOD UNIT TYPE CODE: 5100
BUN SERPL-MCNC: 29 MG/DL (ref 6–20)
CALCIUM SERPL-MCNC: 7.9 MG/DL (ref 8.7–10.5)
CHLORIDE SERPL-SCNC: 101 MMOL/L (ref 95–110)
CO2 SERPL-SCNC: 26 MMOL/L (ref 23–29)
CREAT SERPL-MCNC: 1.7 MG/DL (ref 0.5–1.4)
CROSSMATCH INTERPRETATION: NORMAL
CROSSMATCH INTERPRETATION: NORMAL
DISPENSE STATUS: NORMAL
DISPENSE STATUS: NORMAL
ERYTHROCYTE [DISTWIDTH] IN BLOOD BY AUTOMATED COUNT: 17 % (ref 11.5–14.5)
GFR SERPLBLD CREATININE-BSD FMLA CKD-EPI: 53 ML/MIN/1.73/M2
GLUCOSE SERPL-MCNC: 99 MG/DL (ref 70–110)
HCT VFR BLD AUTO: 25.3 % (ref 40–54)
HGB BLD-MCNC: 8.6 GM/DL (ref 14–18)
IMM GRANULOCYTES # BLD AUTO: 0.02 K/UL (ref 0–0.04)
IMM GRANULOCYTES NFR BLD AUTO: 0.4 % (ref 0–0.5)
LYMPHOCYTES # BLD AUTO: 1.37 K/UL (ref 1–4.8)
MCH RBC QN AUTO: 33.5 PG (ref 27–31)
MCHC RBC AUTO-ENTMCNC: 34 G/DL (ref 32–36)
MCV RBC AUTO: 98 FL (ref 82–98)
NUCLEATED RBC (/100WBC) (OHS): 0 /100 WBC
PLATELET # BLD AUTO: 65 K/UL (ref 150–450)
PLATELET BLD QL SMEAR: ABNORMAL
PMV BLD AUTO: 9.8 FL (ref 9.2–12.9)
POTASSIUM SERPL-SCNC: 3.3 MMOL/L (ref 3.5–5.1)
PROT SERPL-MCNC: 6 GM/DL (ref 6–8.4)
RBC # BLD AUTO: 2.57 M/UL (ref 4.6–6.2)
RELATIVE EOSINOPHIL (OHS): 3.1 %
RELATIVE LYMPHOCYTE (OHS): 28.1 % (ref 18–48)
RELATIVE MONOCYTE (OHS): 12.3 % (ref 4–15)
RELATIVE NEUTROPHIL (OHS): 55.7 % (ref 38–73)
SODIUM SERPL-SCNC: 135 MMOL/L (ref 136–145)
UNIT NUMBER: NORMAL
UNIT NUMBER: NORMAL
WBC # BLD AUTO: 4.87 K/UL (ref 3.9–12.7)

## 2025-07-27 PROCEDURE — 80053 COMPREHEN METABOLIC PANEL: CPT

## 2025-07-27 PROCEDURE — 97530 THERAPEUTIC ACTIVITIES: CPT

## 2025-07-27 PROCEDURE — 97116 GAIT TRAINING THERAPY: CPT

## 2025-07-27 PROCEDURE — 99024 POSTOP FOLLOW-UP VISIT: CPT | Mod: ,,, | Performed by: STUDENT IN AN ORGANIZED HEALTH CARE EDUCATION/TRAINING PROGRAM

## 2025-07-27 PROCEDURE — 36415 COLL VENOUS BLD VENIPUNCTURE: CPT

## 2025-07-27 PROCEDURE — 85025 COMPLETE CBC W/AUTO DIFF WBC: CPT

## 2025-07-27 PROCEDURE — 25000003 PHARM REV CODE 250

## 2025-07-27 PROCEDURE — 97161 PT EVAL LOW COMPLEX 20 MIN: CPT

## 2025-07-27 PROCEDURE — 63600175 PHARM REV CODE 636 W HCPCS

## 2025-07-27 PROCEDURE — 20600001 HC STEP DOWN PRIVATE ROOM

## 2025-07-27 RX ADMIN — LACTULOSE 10 G: 20 SOLUTION ORAL at 08:07

## 2025-07-27 RX ADMIN — TORSEMIDE 20 MG: 20 TABLET ORAL at 09:07

## 2025-07-27 RX ADMIN — HYDROMORPHONE HYDROCHLORIDE 1.5 MG: 1 INJECTION, SOLUTION INTRAMUSCULAR; INTRAVENOUS; SUBCUTANEOUS at 03:07

## 2025-07-27 RX ADMIN — METHOCARBAMOL 750 MG: 750 TABLET ORAL at 05:07

## 2025-07-27 RX ADMIN — DIAZEPAM 5 MG: 5 TABLET ORAL at 08:07

## 2025-07-27 RX ADMIN — LACTULOSE 10 G: 20 SOLUTION ORAL at 09:07

## 2025-07-27 RX ADMIN — OXYCODONE HYDROCHLORIDE 30 MG: 10 TABLET ORAL at 10:07

## 2025-07-27 RX ADMIN — HYDROMORPHONE HYDROCHLORIDE 1.5 MG: 1 INJECTION, SOLUTION INTRAMUSCULAR; INTRAVENOUS; SUBCUTANEOUS at 07:07

## 2025-07-27 RX ADMIN — SIROLIMUS 1 MG: 1 TABLET ORAL at 06:07

## 2025-07-27 RX ADMIN — HYDROMORPHONE HYDROCHLORIDE 1.5 MG: 1 INJECTION, SOLUTION INTRAMUSCULAR; INTRAVENOUS; SUBCUTANEOUS at 11:07

## 2025-07-27 RX ADMIN — GABAPENTIN 400 MG: 400 CAPSULE ORAL at 03:07

## 2025-07-27 RX ADMIN — GABAPENTIN 400 MG: 400 CAPSULE ORAL at 08:07

## 2025-07-27 RX ADMIN — GABAPENTIN 400 MG: 400 CAPSULE ORAL at 09:07

## 2025-07-27 RX ADMIN — METHOCARBAMOL 750 MG: 750 TABLET ORAL at 09:07

## 2025-07-27 RX ADMIN — METOPROLOL TARTRATE 12.5 MG: 25 TABLET, FILM COATED ORAL at 09:07

## 2025-07-27 RX ADMIN — LIDOCAINE 3 PATCH: 50 PATCH CUTANEOUS at 05:07

## 2025-07-27 RX ADMIN — OXYCODONE HYDROCHLORIDE 30 MG: 10 TABLET ORAL at 05:07

## 2025-07-27 RX ADMIN — VALACYCLOVIR HYDROCHLORIDE 500 MG: 500 TABLET, FILM COATED ORAL at 09:07

## 2025-07-27 RX ADMIN — METOPROLOL TARTRATE 12.5 MG: 25 TABLET, FILM COATED ORAL at 08:07

## 2025-07-27 RX ADMIN — METHOCARBAMOL 750 MG: 750 TABLET ORAL at 01:07

## 2025-07-27 RX ADMIN — METHOCARBAMOL 750 MG: 750 TABLET ORAL at 08:07

## 2025-07-27 RX ADMIN — MUPIROCIN: 20 OINTMENT TOPICAL at 09:07

## 2025-07-27 RX ADMIN — TENOFOVIR ALAFENAMIDE 25 MG: 25 TABLET ORAL at 06:07

## 2025-07-28 LAB
ABSOLUTE EOSINOPHIL (OHS): 0.17 K/UL
ABSOLUTE MONOCYTE (OHS): 0.65 K/UL (ref 0.3–1)
ABSOLUTE NEUTROPHIL COUNT (OHS): 2.38 K/UL (ref 1.8–7.7)
ALBUMIN SERPL BCP-MCNC: 2.2 G/DL (ref 3.5–5.2)
ALP SERPL-CCNC: 153 UNIT/L (ref 40–150)
ALT SERPL W/O P-5'-P-CCNC: 20 UNIT/L (ref 0–55)
ANION GAP (OHS): 9 MMOL/L (ref 8–16)
AST SERPL-CCNC: 74 UNIT/L (ref 0–50)
BACTERIA FLD AEROBE CULT: NO GROWTH
BASOPHILS # BLD AUTO: 0.02 K/UL
BASOPHILS NFR BLD AUTO: 0.4 %
BILIRUB SERPL-MCNC: 5.8 MG/DL (ref 0.1–1)
BUN SERPL-MCNC: 27 MG/DL (ref 6–20)
CALCIUM SERPL-MCNC: 7.9 MG/DL (ref 8.7–10.5)
CHLORIDE SERPL-SCNC: 98 MMOL/L (ref 95–110)
CO2 SERPL-SCNC: 29 MMOL/L (ref 23–29)
CREAT SERPL-MCNC: 1.9 MG/DL (ref 0.5–1.4)
ERYTHROCYTE [DISTWIDTH] IN BLOOD BY AUTOMATED COUNT: 16.2 % (ref 11.5–14.5)
GFR SERPLBLD CREATININE-BSD FMLA CKD-EPI: 46 ML/MIN/1.73/M2
GLUCOSE SERPL-MCNC: 96 MG/DL (ref 70–110)
GRAM STN SPEC: NORMAL
GRAM STN SPEC: NORMAL
HCT VFR BLD AUTO: 24.3 % (ref 40–54)
HGB BLD-MCNC: 8.2 GM/DL (ref 14–18)
IMM GRANULOCYTES # BLD AUTO: 0.02 K/UL (ref 0–0.04)
IMM GRANULOCYTES NFR BLD AUTO: 0.4 % (ref 0–0.5)
LYMPHOCYTES # BLD AUTO: 1.47 K/UL (ref 1–4.8)
MCH RBC QN AUTO: 33.7 PG (ref 27–31)
MCHC RBC AUTO-ENTMCNC: 33.7 G/DL (ref 32–36)
MCV RBC AUTO: 100 FL (ref 82–98)
NUCLEATED RBC (/100WBC) (OHS): 0 /100 WBC
PLATELET # BLD AUTO: 71 K/UL (ref 150–450)
PMV BLD AUTO: 10 FL (ref 9.2–12.9)
POTASSIUM SERPL-SCNC: 2.9 MMOL/L (ref 3.5–5.1)
PROT SERPL-MCNC: 6.3 GM/DL (ref 6–8.4)
RBC # BLD AUTO: 2.43 M/UL (ref 4.6–6.2)
RELATIVE EOSINOPHIL (OHS): 3.6 %
RELATIVE LYMPHOCYTE (OHS): 31.2 % (ref 18–48)
RELATIVE MONOCYTE (OHS): 13.8 % (ref 4–15)
RELATIVE NEUTROPHIL (OHS): 50.6 % (ref 38–73)
SODIUM SERPL-SCNC: 136 MMOL/L (ref 136–145)
WBC # BLD AUTO: 4.71 K/UL (ref 3.9–12.7)

## 2025-07-28 PROCEDURE — 25000003 PHARM REV CODE 250: Performed by: PHYSICIAN ASSISTANT

## 2025-07-28 PROCEDURE — 20600001 HC STEP DOWN PRIVATE ROOM

## 2025-07-28 PROCEDURE — 36415 COLL VENOUS BLD VENIPUNCTURE: CPT

## 2025-07-28 PROCEDURE — 99024 POSTOP FOLLOW-UP VISIT: CPT | Mod: ,,, | Performed by: STUDENT IN AN ORGANIZED HEALTH CARE EDUCATION/TRAINING PROGRAM

## 2025-07-28 PROCEDURE — 63600175 PHARM REV CODE 636 W HCPCS

## 2025-07-28 PROCEDURE — 25000003 PHARM REV CODE 250

## 2025-07-28 PROCEDURE — 85025 COMPLETE CBC W/AUTO DIFF WBC: CPT

## 2025-07-28 PROCEDURE — 36415 COLL VENOUS BLD VENIPUNCTURE: CPT | Performed by: PHYSICIAN ASSISTANT

## 2025-07-28 PROCEDURE — 84132 ASSAY OF SERUM POTASSIUM: CPT | Performed by: PHYSICIAN ASSISTANT

## 2025-07-28 PROCEDURE — 80053 COMPREHEN METABOLIC PANEL: CPT

## 2025-07-28 RX ADMIN — OXYCODONE HYDROCHLORIDE 30 MG: 10 TABLET ORAL at 04:07

## 2025-07-28 RX ADMIN — HYDROMORPHONE HYDROCHLORIDE 1.5 MG: 1 INJECTION, SOLUTION INTRAMUSCULAR; INTRAVENOUS; SUBCUTANEOUS at 07:07

## 2025-07-28 RX ADMIN — MUPIROCIN: 20 OINTMENT TOPICAL at 07:07

## 2025-07-28 RX ADMIN — HYDROMORPHONE HYDROCHLORIDE 1.5 MG: 1 INJECTION, SOLUTION INTRAMUSCULAR; INTRAVENOUS; SUBCUTANEOUS at 03:07

## 2025-07-28 RX ADMIN — GABAPENTIN 400 MG: 400 CAPSULE ORAL at 08:07

## 2025-07-28 RX ADMIN — LIDOCAINE 2 PATCH: 50 PATCH CUTANEOUS at 04:07

## 2025-07-28 RX ADMIN — METHOCARBAMOL 750 MG: 750 TABLET ORAL at 07:07

## 2025-07-28 RX ADMIN — METHOCARBAMOL 750 MG: 750 TABLET ORAL at 01:07

## 2025-07-28 RX ADMIN — TORSEMIDE 20 MG: 20 TABLET ORAL at 07:07

## 2025-07-28 RX ADMIN — METHOCARBAMOL 750 MG: 750 TABLET ORAL at 08:07

## 2025-07-28 RX ADMIN — POTASSIUM BICARBONATE 40 MEQ: 391 TABLET, EFFERVESCENT ORAL at 07:07

## 2025-07-28 RX ADMIN — DIAZEPAM 5 MG: 5 TABLET ORAL at 09:07

## 2025-07-28 RX ADMIN — METOPROLOL TARTRATE 12.5 MG: 25 TABLET, FILM COATED ORAL at 07:07

## 2025-07-28 RX ADMIN — HYDROMORPHONE HYDROCHLORIDE 1.5 MG: 1 INJECTION, SOLUTION INTRAMUSCULAR; INTRAVENOUS; SUBCUTANEOUS at 11:07

## 2025-07-28 RX ADMIN — VALACYCLOVIR HYDROCHLORIDE 500 MG: 500 TABLET, FILM COATED ORAL at 07:07

## 2025-07-28 RX ADMIN — OXYCODONE HYDROCHLORIDE 30 MG: 10 TABLET ORAL at 10:07

## 2025-07-28 RX ADMIN — OXYCODONE HYDROCHLORIDE 30 MG: 10 TABLET ORAL at 11:07

## 2025-07-28 RX ADMIN — LACTULOSE 10 G: 20 SOLUTION ORAL at 08:07

## 2025-07-28 RX ADMIN — METOPROLOL TARTRATE 12.5 MG: 25 TABLET, FILM COATED ORAL at 08:07

## 2025-07-28 RX ADMIN — MUPIROCIN: 20 OINTMENT TOPICAL at 08:07

## 2025-07-28 RX ADMIN — GABAPENTIN 400 MG: 400 CAPSULE ORAL at 03:07

## 2025-07-28 RX ADMIN — TENOFOVIR ALAFENAMIDE 25 MG: 25 TABLET ORAL at 06:07

## 2025-07-28 RX ADMIN — GABAPENTIN 400 MG: 400 CAPSULE ORAL at 07:07

## 2025-07-28 RX ADMIN — LACTULOSE 10 G: 20 SOLUTION ORAL at 07:07

## 2025-07-28 RX ADMIN — METHOCARBAMOL 750 MG: 750 TABLET ORAL at 04:07

## 2025-07-28 RX ADMIN — SIROLIMUS 1 MG: 1 TABLET ORAL at 06:07

## 2025-07-29 LAB
ABSOLUTE EOSINOPHIL (OHS): 0.2 K/UL
ABSOLUTE MONOCYTE (OHS): 0.8 K/UL (ref 0.3–1)
ABSOLUTE NEUTROPHIL COUNT (OHS): 2.61 K/UL (ref 1.8–7.7)
ALBUMIN SERPL BCP-MCNC: 2.1 G/DL (ref 3.5–5.2)
ALP SERPL-CCNC: 175 UNIT/L (ref 40–150)
ALT SERPL W/O P-5'-P-CCNC: 19 UNIT/L (ref 0–55)
ANION GAP (OHS): 10 MMOL/L (ref 8–16)
ANION GAP (OHS): 8 MMOL/L (ref 8–16)
AST SERPL-CCNC: 75 UNIT/L (ref 0–50)
BACTERIA TISS AEROBE CULT: NO GROWTH
BACTERIA TISS AEROBE CULT: NO GROWTH
BASOPHILS # BLD AUTO: 0.02 K/UL
BASOPHILS NFR BLD AUTO: 0.4 %
BILIRUB SERPL-MCNC: 5.4 MG/DL (ref 0.1–1)
BUN SERPL-MCNC: 27 MG/DL (ref 6–20)
BUN SERPL-MCNC: 27 MG/DL (ref 6–20)
CALCIUM SERPL-MCNC: 7.6 MG/DL (ref 8.7–10.5)
CALCIUM SERPL-MCNC: 7.8 MG/DL (ref 8.7–10.5)
CHLORIDE SERPL-SCNC: 100 MMOL/L (ref 95–110)
CHLORIDE SERPL-SCNC: 97 MMOL/L (ref 95–110)
CO2 SERPL-SCNC: 28 MMOL/L (ref 23–29)
CO2 SERPL-SCNC: 29 MMOL/L (ref 23–29)
CREAT SERPL-MCNC: 2 MG/DL (ref 0.5–1.4)
CREAT SERPL-MCNC: 2 MG/DL (ref 0.5–1.4)
ERYTHROCYTE [DISTWIDTH] IN BLOOD BY AUTOMATED COUNT: 15.9 % (ref 11.5–14.5)
GFR SERPLBLD CREATININE-BSD FMLA CKD-EPI: 44 ML/MIN/1.73/M2
GFR SERPLBLD CREATININE-BSD FMLA CKD-EPI: 44 ML/MIN/1.73/M2
GLUCOSE SERPL-MCNC: 106 MG/DL (ref 70–110)
GLUCOSE SERPL-MCNC: 117 MG/DL (ref 70–110)
HCT VFR BLD AUTO: 23.7 % (ref 40–54)
HGB BLD-MCNC: 8.1 GM/DL (ref 14–18)
IMM GRANULOCYTES # BLD AUTO: 0.02 K/UL (ref 0–0.04)
IMM GRANULOCYTES NFR BLD AUTO: 0.4 % (ref 0–0.5)
LYMPHOCYTES # BLD AUTO: 1.43 K/UL (ref 1–4.8)
MCH RBC QN AUTO: 33.3 PG (ref 27–31)
MCHC RBC AUTO-ENTMCNC: 34.2 G/DL (ref 32–36)
MCV RBC AUTO: 98 FL (ref 82–98)
NUCLEATED RBC (/100WBC) (OHS): 0 /100 WBC
PLATELET # BLD AUTO: 81 K/UL (ref 150–450)
PMV BLD AUTO: 9.9 FL (ref 9.2–12.9)
POTASSIUM SERPL-SCNC: 2.6 MMOL/L (ref 3.5–5.1)
POTASSIUM SERPL-SCNC: 2.9 MMOL/L (ref 3.5–5.1)
POTASSIUM SERPL-SCNC: 3.6 MMOL/L (ref 3.5–5.1)
PROT SERPL-MCNC: 6.2 GM/DL (ref 6–8.4)
RBC # BLD AUTO: 2.43 M/UL (ref 4.6–6.2)
RELATIVE EOSINOPHIL (OHS): 3.9 %
RELATIVE LYMPHOCYTE (OHS): 28.1 % (ref 18–48)
RELATIVE MONOCYTE (OHS): 15.7 % (ref 4–15)
RELATIVE NEUTROPHIL (OHS): 51.5 % (ref 38–73)
SODIUM SERPL-SCNC: 135 MMOL/L (ref 136–145)
SODIUM SERPL-SCNC: 137 MMOL/L (ref 136–145)
WBC # BLD AUTO: 5.08 K/UL (ref 3.9–12.7)

## 2025-07-29 PROCEDURE — 63600175 PHARM REV CODE 636 W HCPCS

## 2025-07-29 PROCEDURE — 25000003 PHARM REV CODE 250

## 2025-07-29 PROCEDURE — 82310 ASSAY OF CALCIUM: CPT

## 2025-07-29 PROCEDURE — 36415 COLL VENOUS BLD VENIPUNCTURE: CPT | Performed by: STUDENT IN AN ORGANIZED HEALTH CARE EDUCATION/TRAINING PROGRAM

## 2025-07-29 PROCEDURE — 20600001 HC STEP DOWN PRIVATE ROOM

## 2025-07-29 PROCEDURE — 25000003 PHARM REV CODE 250: Performed by: STUDENT IN AN ORGANIZED HEALTH CARE EDUCATION/TRAINING PROGRAM

## 2025-07-29 PROCEDURE — 82040 ASSAY OF SERUM ALBUMIN: CPT | Performed by: STUDENT IN AN ORGANIZED HEALTH CARE EDUCATION/TRAINING PROGRAM

## 2025-07-29 PROCEDURE — 63600175 PHARM REV CODE 636 W HCPCS: Performed by: STUDENT IN AN ORGANIZED HEALTH CARE EDUCATION/TRAINING PROGRAM

## 2025-07-29 PROCEDURE — 99024 POSTOP FOLLOW-UP VISIT: CPT | Mod: ,,, | Performed by: STUDENT IN AN ORGANIZED HEALTH CARE EDUCATION/TRAINING PROGRAM

## 2025-07-29 PROCEDURE — 87040 BLOOD CULTURE FOR BACTERIA: CPT

## 2025-07-29 PROCEDURE — 36415 COLL VENOUS BLD VENIPUNCTURE: CPT

## 2025-07-29 PROCEDURE — 85025 COMPLETE CBC W/AUTO DIFF WBC: CPT | Performed by: STUDENT IN AN ORGANIZED HEALTH CARE EDUCATION/TRAINING PROGRAM

## 2025-07-29 RX ORDER — POTASSIUM CHLORIDE 20 MEQ/1
40 TABLET, EXTENDED RELEASE ORAL EVERY 4 HOURS
Status: DISCONTINUED | OUTPATIENT
Start: 2025-07-29 | End: 2025-07-29

## 2025-07-29 RX ORDER — POTASSIUM CHLORIDE 7.45 MG/ML
10 INJECTION INTRAVENOUS
Status: COMPLETED | OUTPATIENT
Start: 2025-07-29 | End: 2025-07-29

## 2025-07-29 RX ORDER — POTASSIUM CHLORIDE 20 MEQ/1
40 TABLET, EXTENDED RELEASE ORAL ONCE
Status: DISCONTINUED | OUTPATIENT
Start: 2025-07-29 | End: 2025-07-29

## 2025-07-29 RX ORDER — POTASSIUM CHLORIDE 20 MEQ/1
40 TABLET, EXTENDED RELEASE ORAL ONCE
Status: COMPLETED | OUTPATIENT
Start: 2025-07-29 | End: 2025-07-29

## 2025-07-29 RX ADMIN — METOPROLOL TARTRATE 12.5 MG: 25 TABLET, FILM COATED ORAL at 09:07

## 2025-07-29 RX ADMIN — ACETAMINOPHEN 1000 MG: 500 TABLET ORAL at 08:07

## 2025-07-29 RX ADMIN — METHOCARBAMOL 750 MG: 750 TABLET ORAL at 07:07

## 2025-07-29 RX ADMIN — POTASSIUM CHLORIDE 10 MEQ: 7.46 INJECTION, SOLUTION INTRAVENOUS at 10:07

## 2025-07-29 RX ADMIN — METOPROLOL TARTRATE 12.5 MG: 25 TABLET, FILM COATED ORAL at 07:07

## 2025-07-29 RX ADMIN — POTASSIUM CHLORIDE 40 MEQ: 1500 TABLET, EXTENDED RELEASE ORAL at 05:07

## 2025-07-29 RX ADMIN — OXYCODONE HYDROCHLORIDE 30 MG: 10 TABLET ORAL at 01:07

## 2025-07-29 RX ADMIN — GABAPENTIN 400 MG: 400 CAPSULE ORAL at 11:07

## 2025-07-29 RX ADMIN — SIROLIMUS 1 MG: 1 TABLET ORAL at 06:07

## 2025-07-29 RX ADMIN — METHOCARBAMOL 750 MG: 750 TABLET ORAL at 09:07

## 2025-07-29 RX ADMIN — METHOCARBAMOL 750 MG: 750 TABLET ORAL at 01:07

## 2025-07-29 RX ADMIN — HYDROMORPHONE HYDROCHLORIDE 1.5 MG: 1 INJECTION, SOLUTION INTRAMUSCULAR; INTRAVENOUS; SUBCUTANEOUS at 07:07

## 2025-07-29 RX ADMIN — GABAPENTIN 400 MG: 400 CAPSULE ORAL at 03:07

## 2025-07-29 RX ADMIN — HYDROMORPHONE HYDROCHLORIDE 1.5 MG: 1 INJECTION, SOLUTION INTRAMUSCULAR; INTRAVENOUS; SUBCUTANEOUS at 03:07

## 2025-07-29 RX ADMIN — ACETAMINOPHEN 1000 MG: 500 TABLET ORAL at 01:07

## 2025-07-29 RX ADMIN — OXYCODONE HYDROCHLORIDE 30 MG: 10 TABLET ORAL at 11:07

## 2025-07-29 RX ADMIN — POTASSIUM CHLORIDE 10 MEQ: 7.46 INJECTION, SOLUTION INTRAVENOUS at 07:07

## 2025-07-29 RX ADMIN — TENOFOVIR ALAFENAMIDE 25 MG: 25 TABLET ORAL at 05:07

## 2025-07-29 RX ADMIN — GABAPENTIN 400 MG: 400 CAPSULE ORAL at 07:07

## 2025-07-29 RX ADMIN — MUPIROCIN: 20 OINTMENT TOPICAL at 07:07

## 2025-07-29 RX ADMIN — LACTULOSE 10 G: 20 SOLUTION ORAL at 07:07

## 2025-07-29 RX ADMIN — VALACYCLOVIR HYDROCHLORIDE 500 MG: 500 TABLET, FILM COATED ORAL at 07:07

## 2025-07-29 RX ADMIN — POTASSIUM CHLORIDE 10 MEQ: 7.46 INJECTION, SOLUTION INTRAVENOUS at 09:07

## 2025-07-29 RX ADMIN — OXYCODONE HYDROCHLORIDE 30 MG: 10 TABLET ORAL at 06:07

## 2025-07-29 RX ADMIN — POTASSIUM CHLORIDE 40 MEQ: 1500 TABLET, EXTENDED RELEASE ORAL at 01:07

## 2025-07-29 RX ADMIN — METHOCARBAMOL 750 MG: 750 TABLET ORAL at 05:07

## 2025-07-30 LAB
ABSOLUTE EOSINOPHIL (OHS): 0.23 K/UL
ABSOLUTE MONOCYTE (OHS): 1.21 K/UL (ref 0.3–1)
ABSOLUTE NEUTROPHIL COUNT (OHS): 3.86 K/UL (ref 1.8–7.7)
ALBUMIN SERPL BCP-MCNC: 1.9 G/DL (ref 3.5–5.2)
ALLENS TEST: ABNORMAL
ALP SERPL-CCNC: 135 UNIT/L (ref 40–150)
ALT SERPL W/O P-5'-P-CCNC: 14 UNIT/L (ref 0–55)
AMMONIA PLAS-SCNC: 112 UMOL/L (ref 10–50)
AMMONIA PLAS-SCNC: 129 UMOL/L (ref 10–50)
ANION GAP (OHS): 11 MMOL/L (ref 8–16)
ANION GAP (OHS): 11 MMOL/L (ref 8–16)
AST SERPL-CCNC: 66 UNIT/L (ref 0–50)
B PERT DNA NPH QL NAA+PROBE: NOT DETECTED
BASOPHILS # BLD AUTO: 0.03 K/UL
BASOPHILS NFR BLD AUTO: 0.4 %
BILIRUB SERPL-MCNC: 6.9 MG/DL (ref 0.1–1)
BUN SERPL-MCNC: 34 MG/DL (ref 6–20)
BUN SERPL-MCNC: 34 MG/DL (ref 6–20)
C PNEUM DNA LOWER RESP QL NAA+NON-PROBE: NOT DETECTED
CALCIUM SERPL-MCNC: 8.1 MG/DL (ref 8.7–10.5)
CALCIUM SERPL-MCNC: 8.4 MG/DL (ref 8.7–10.5)
CHLORIDE SERPL-SCNC: 100 MMOL/L (ref 95–110)
CHLORIDE SERPL-SCNC: 104 MMOL/L (ref 95–110)
CO2 SERPL-SCNC: 23 MMOL/L (ref 23–29)
CO2 SERPL-SCNC: 23 MMOL/L (ref 23–29)
CREAT SERPL-MCNC: 2 MG/DL (ref 0.5–1.4)
CREAT SERPL-MCNC: 2.2 MG/DL (ref 0.5–1.4)
DELSYS: ABNORMAL
ERYTHROCYTE [DISTWIDTH] IN BLOOD BY AUTOMATED COUNT: 16.3 % (ref 11.5–14.5)
FIO2: 21
FLUAV RNA NPH QL NAA+NON-PROBE: NOT DETECTED
FLUBV RNA NPH QL NAA+NON-PROBE: NOT DETECTED
GFR SERPLBLD CREATININE-BSD FMLA CKD-EPI: 39 ML/MIN/1.73/M2
GFR SERPLBLD CREATININE-BSD FMLA CKD-EPI: 44 ML/MIN/1.73/M2
GLUCOSE SERPL-MCNC: 90 MG/DL (ref 70–110)
GLUCOSE SERPL-MCNC: 95 MG/DL (ref 70–110)
HADV DNA NPH QL NAA+NON-PROBE: NOT DETECTED
HCO3 UR-SCNC: 23.3 MMOL/L (ref 24–28)
HCOV 229E RNA NPH QL NAA+NON-PROBE: NOT DETECTED
HCOV HKU1 RNA NPH QL NAA+NON-PROBE: NOT DETECTED
HCOV NL63 RNA NPH QL NAA+NON-PROBE: NOT DETECTED
HCOV OC43 RNA NPH QL NAA+NON-PROBE: NOT DETECTED
HCT VFR BLD AUTO: 24.6 % (ref 40–54)
HCT VFR BLD CALC: 33 %PCV (ref 36–54)
HGB BLD-MCNC: 8.3 GM/DL (ref 14–18)
HMPV RNA LOWER RESP QL NAA+NON-PROBE: NOT DETECTED
HMPV RNA NPH QL NAA+NON-PROBE: NOT DETECTED
HOLD SPECIMEN: NORMAL
HPIV1 RNA NPH QL NAA+NON-PROBE: NOT DETECTED
HPIV2 RNA NPH QL NAA+NON-PROBE: NOT DETECTED
HPIV3 RNA NPH QL NAA+NON-PROBE: NOT DETECTED
HPIV4 RNA NPH QL NAA+NON-PROBE: NOT DETECTED
IMM GRANULOCYTES # BLD AUTO: 0.03 K/UL (ref 0–0.04)
IMM GRANULOCYTES NFR BLD AUTO: 0.4 % (ref 0–0.5)
LACTATE SERPL-SCNC: 1.7 MMOL/L (ref 0.5–2.2)
LACTATE SERPL-SCNC: 3.9 MMOL/L (ref 0.5–2.2)
LYMPHOCYTES # BLD AUTO: 2.05 K/UL (ref 1–4.8)
MCH RBC QN AUTO: 32.9 PG (ref 27–31)
MCHC RBC AUTO-ENTMCNC: 33.7 G/DL (ref 32–36)
MCV RBC AUTO: 98 FL (ref 82–98)
MODE: ABNORMAL
NUCLEATED RBC (/100WBC) (OHS): 0 /100 WBC
PCO2 BLDA: 31.9 MMHG (ref 35–45)
PH SMN: 7.47 [PH] (ref 7.35–7.45)
PLATELET # BLD AUTO: 102 K/UL (ref 150–450)
PMV BLD AUTO: 10.2 FL (ref 9.2–12.9)
PO2 BLDA: 60 MMHG (ref 40–60)
POC BE: 0 MMOL/L (ref -2–2)
POC IONIZED CALCIUM: 1.09 MMOL/L (ref 1.06–1.42)
POC SATURATED O2: 92 % (ref 95–100)
POC TCO2: 24 MMOL/L (ref 24–29)
POCT GLUCOSE: 93 MG/DL (ref 70–110)
POTASSIUM BLD-SCNC: 3.1 MMOL/L (ref 3.5–5.1)
POTASSIUM SERPL-SCNC: 3 MMOL/L (ref 3.5–5.1)
POTASSIUM SERPL-SCNC: 3.1 MMOL/L (ref 3.5–5.1)
PROT SERPL-MCNC: 5.9 GM/DL (ref 6–8.4)
RBC # BLD AUTO: 2.52 M/UL (ref 4.6–6.2)
RELATIVE EOSINOPHIL (OHS): 3.1 %
RELATIVE LYMPHOCYTE (OHS): 27.7 % (ref 18–48)
RELATIVE MONOCYTE (OHS): 16.3 % (ref 4–15)
RELATIVE NEUTROPHIL (OHS): 52.1 % (ref 38–73)
RSV RNA NPH QL NAA+NON-PROBE: NOT DETECTED
RSV RNA NPH QL NAA+NON-PROBE: NOT DETECTED
RV+EV RNA NPH QL NAA+NON-PROBE: NOT DETECTED
SAMPLE: ABNORMAL
SARS-COV-2 RNA RESP QL NAA+PROBE: NOT DETECTED
SITE: ABNORMAL
SODIUM BLD-SCNC: 138 MMOL/L (ref 136–145)
SODIUM SERPL-SCNC: 134 MMOL/L (ref 136–145)
SODIUM SERPL-SCNC: 138 MMOL/L (ref 136–145)
SP02: 96
SPECIMEN SOURCE: NORMAL
WBC # BLD AUTO: 7.41 K/UL (ref 3.9–12.7)

## 2025-07-30 PROCEDURE — 82140 ASSAY OF AMMONIA: CPT

## 2025-07-30 PROCEDURE — 63600175 PHARM REV CODE 636 W HCPCS

## 2025-07-30 PROCEDURE — 0202U NFCT DS 22 TRGT SARS-COV-2: CPT

## 2025-07-30 PROCEDURE — 36415 COLL VENOUS BLD VENIPUNCTURE: CPT

## 2025-07-30 PROCEDURE — 82140 ASSAY OF AMMONIA: CPT | Performed by: STUDENT IN AN ORGANIZED HEALTH CARE EDUCATION/TRAINING PROGRAM

## 2025-07-30 PROCEDURE — 36415 COLL VENOUS BLD VENIPUNCTURE: CPT | Performed by: STUDENT IN AN ORGANIZED HEALTH CARE EDUCATION/TRAINING PROGRAM

## 2025-07-30 PROCEDURE — 99024 POSTOP FOLLOW-UP VISIT: CPT | Mod: ,,, | Performed by: STUDENT IN AN ORGANIZED HEALTH CARE EDUCATION/TRAINING PROGRAM

## 2025-07-30 PROCEDURE — 85025 COMPLETE CBC W/AUTO DIFF WBC: CPT | Performed by: STUDENT IN AN ORGANIZED HEALTH CARE EDUCATION/TRAINING PROGRAM

## 2025-07-30 PROCEDURE — 82947 ASSAY GLUCOSE BLOOD QUANT: CPT

## 2025-07-30 PROCEDURE — 82803 BLOOD GASES ANY COMBINATION: CPT

## 2025-07-30 PROCEDURE — 80053 COMPREHEN METABOLIC PANEL: CPT | Performed by: STUDENT IN AN ORGANIZED HEALTH CARE EDUCATION/TRAINING PROGRAM

## 2025-07-30 PROCEDURE — 25000003 PHARM REV CODE 250: Performed by: STUDENT IN AN ORGANIZED HEALTH CARE EDUCATION/TRAINING PROGRAM

## 2025-07-30 PROCEDURE — 63600175 PHARM REV CODE 636 W HCPCS: Performed by: STUDENT IN AN ORGANIZED HEALTH CARE EDUCATION/TRAINING PROGRAM

## 2025-07-30 PROCEDURE — 94761 N-INVAS EAR/PLS OXIMETRY MLT: CPT | Mod: XB

## 2025-07-30 PROCEDURE — 25000003 PHARM REV CODE 250

## 2025-07-30 PROCEDURE — 20600001 HC STEP DOWN PRIVATE ROOM

## 2025-07-30 PROCEDURE — 83605 ASSAY OF LACTIC ACID: CPT

## 2025-07-30 RX ORDER — HEPARIN SODIUM 5000 [USP'U]/ML
5000 INJECTION, SOLUTION INTRAVENOUS; SUBCUTANEOUS EVERY 8 HOURS
Status: DISCONTINUED | OUTPATIENT
Start: 2025-07-30 | End: 2025-08-05 | Stop reason: HOSPADM

## 2025-07-30 RX ORDER — ACETAMINOPHEN 120 MG/1
60 SUPPOSITORY RECTAL EVERY 6 HOURS PRN
Status: DISCONTINUED | OUTPATIENT
Start: 2025-07-30 | End: 2025-07-30

## 2025-07-30 RX ORDER — ACETAMINOPHEN 325 MG/1
650 TABLET ORAL EVERY 6 HOURS PRN
Status: DISCONTINUED | OUTPATIENT
Start: 2025-07-30 | End: 2025-08-05 | Stop reason: HOSPADM

## 2025-07-30 RX ORDER — LACTULOSE 10 G/15ML
200 SOLUTION ORAL; RECTAL ONCE
Status: COMPLETED | OUTPATIENT
Start: 2025-07-30 | End: 2025-07-30

## 2025-07-30 RX ORDER — POTASSIUM CHLORIDE 7.45 MG/ML
10 INJECTION INTRAVENOUS
Status: COMPLETED | OUTPATIENT
Start: 2025-07-30 | End: 2025-07-30

## 2025-07-30 RX ORDER — LACTULOSE 10 G/15ML
200 SOLUTION ORAL; RECTAL ONCE
Status: COMPLETED | OUTPATIENT
Start: 2025-07-30 | End: 2025-07-31

## 2025-07-30 RX ADMIN — HEPARIN SODIUM 5000 UNITS: 5000 INJECTION INTRAVENOUS; SUBCUTANEOUS at 03:07

## 2025-07-30 RX ADMIN — LACTULOSE 200 G: 10 SOLUTION ORAL at 03:07

## 2025-07-30 RX ADMIN — RIFAXIMIN 550 MG: 550 TABLET ORAL at 09:07

## 2025-07-30 RX ADMIN — METOPROLOL TARTRATE 12.5 MG: 25 TABLET, FILM COATED ORAL at 09:07

## 2025-07-30 RX ADMIN — PIPERACILLIN SODIUM AND TAZOBACTAM SODIUM 4.5 G: 4; .5 INJECTION, POWDER, LYOPHILIZED, FOR SOLUTION INTRAVENOUS at 07:07

## 2025-07-30 RX ADMIN — ACETAMINOPHEN 650 MG: 325 TABLET ORAL at 05:07

## 2025-07-30 RX ADMIN — LACTULOSE 20 G: 20 SOLUTION ORAL at 08:07

## 2025-07-30 RX ADMIN — POTASSIUM CHLORIDE 10 MEQ: 7.46 INJECTION, SOLUTION INTRAVENOUS at 12:07

## 2025-07-30 RX ADMIN — HEPARIN SODIUM 5000 UNITS: 5000 INJECTION INTRAVENOUS; SUBCUTANEOUS at 08:07

## 2025-07-30 RX ADMIN — VANCOMYCIN HYDROCHLORIDE 1250 MG: 1.25 INJECTION, POWDER, LYOPHILIZED, FOR SOLUTION INTRAVENOUS at 11:07

## 2025-07-30 RX ADMIN — OXYCODONE HYDROCHLORIDE 30 MG: 10 TABLET ORAL at 10:07

## 2025-07-30 RX ADMIN — POTASSIUM CHLORIDE 10 MEQ: 7.46 INJECTION, SOLUTION INTRAVENOUS at 02:07

## 2025-07-30 RX ADMIN — HYDROMORPHONE HYDROCHLORIDE 1.5 MG: 1 INJECTION, SOLUTION INTRAMUSCULAR; INTRAVENOUS; SUBCUTANEOUS at 03:07

## 2025-07-30 RX ADMIN — POTASSIUM CHLORIDE 10 MEQ: 7.46 INJECTION, SOLUTION INTRAVENOUS at 11:07

## 2025-07-30 RX ADMIN — LACTULOSE 200 G: 10 SOLUTION ORAL at 09:07

## 2025-07-30 RX ADMIN — LACTULOSE 20 G: 20 SOLUTION ORAL at 05:07

## 2025-07-30 RX ADMIN — PIPERACILLIN SODIUM AND TAZOBACTAM SODIUM 4.5 G: 4; .5 INJECTION, POWDER, LYOPHILIZED, FOR SOLUTION INTRAVENOUS at 04:07

## 2025-07-30 RX ADMIN — POTASSIUM BICARBONATE 25 MEQ: 978 TABLET, EFFERVESCENT ORAL at 08:07

## 2025-07-31 LAB
ABSOLUTE EOSINOPHIL (OHS): 0.37 K/UL
ABSOLUTE MONOCYTE (OHS): 0.78 K/UL (ref 0.3–1)
ABSOLUTE NEUTROPHIL COUNT (OHS): 4.23 K/UL (ref 1.8–7.7)
ALBUMIN SERPL BCP-MCNC: 1.9 G/DL (ref 3.5–5.2)
ALP SERPL-CCNC: 119 UNIT/L (ref 40–150)
ALT SERPL W/O P-5'-P-CCNC: 15 UNIT/L (ref 0–55)
AMMONIA PLAS-SCNC: 45 UMOL/L (ref 10–50)
ANION GAP (OHS): 11 MMOL/L (ref 8–16)
AST SERPL-CCNC: 65 UNIT/L (ref 0–50)
BACTERIA SPEC ANAEROBE CULT: NORMAL
BASOPHILS # BLD AUTO: 0.03 K/UL
BASOPHILS NFR BLD AUTO: 0.4 %
BILIRUB DIRECT SERPL-MCNC: 5.8 MG/DL (ref 0.1–0.3)
BILIRUB SERPL-MCNC: 8 MG/DL (ref 0.1–1)
BUN SERPL-MCNC: 34 MG/DL (ref 6–20)
CALCIUM SERPL-MCNC: 8.3 MG/DL (ref 8.7–10.5)
CHLORIDE SERPL-SCNC: 105 MMOL/L (ref 95–110)
CO2 SERPL-SCNC: 23 MMOL/L (ref 23–29)
CREAT SERPL-MCNC: 1.9 MG/DL (ref 0.5–1.4)
ERYTHROCYTE [DISTWIDTH] IN BLOOD BY AUTOMATED COUNT: 16 % (ref 11.5–14.5)
GFR SERPLBLD CREATININE-BSD FMLA CKD-EPI: 46 ML/MIN/1.73/M2
GLUCOSE SERPL-MCNC: 119 MG/DL (ref 70–110)
HBV CORE AB SERPL QL IA: REACTIVE
HBV SURFACE AG SERPL QL IA: REACTIVE
HBV SURFACE AG SERPLBLD QL IA.RAPID: ABNORMAL
HCT VFR BLD AUTO: 26.4 % (ref 40–54)
HGB BLD-MCNC: 9 GM/DL (ref 14–18)
IMM GRANULOCYTES # BLD AUTO: 0.04 K/UL (ref 0–0.04)
IMM GRANULOCYTES NFR BLD AUTO: 0.6 % (ref 0–0.5)
LYMPHOCYTES # BLD AUTO: 1.64 K/UL (ref 1–4.8)
MCH RBC QN AUTO: 33.8 PG (ref 27–31)
MCHC RBC AUTO-ENTMCNC: 34.1 G/DL (ref 32–36)
MCV RBC AUTO: 99 FL (ref 82–98)
NUCLEATED RBC (/100WBC) (OHS): 0 /100 WBC
PLATELET # BLD AUTO: 113 K/UL (ref 150–450)
PMV BLD AUTO: 10 FL (ref 9.2–12.9)
POTASSIUM SERPL-SCNC: 3.1 MMOL/L (ref 3.5–5.1)
PROT SERPL-MCNC: 6.1 GM/DL (ref 6–8.4)
RBC # BLD AUTO: 2.66 M/UL (ref 4.6–6.2)
RELATIVE EOSINOPHIL (OHS): 5.2 %
RELATIVE LYMPHOCYTE (OHS): 23.1 % (ref 18–48)
RELATIVE MONOCYTE (OHS): 11 % (ref 4–15)
RELATIVE NEUTROPHIL (OHS): 59.7 % (ref 38–73)
SIROLIMUS BLD-MCNC: 5.4 NG/ML (ref 4–20)
SODIUM SERPL-SCNC: 139 MMOL/L (ref 136–145)
VANCOMYCIN SERPL-MCNC: 14.8 UG/ML (ref ?–80)
WBC # BLD AUTO: 7.09 K/UL (ref 3.9–12.7)

## 2025-07-31 PROCEDURE — 85025 COMPLETE CBC W/AUTO DIFF WBC: CPT | Performed by: STUDENT IN AN ORGANIZED HEALTH CARE EDUCATION/TRAINING PROGRAM

## 2025-07-31 PROCEDURE — 80053 COMPREHEN METABOLIC PANEL: CPT | Performed by: STUDENT IN AN ORGANIZED HEALTH CARE EDUCATION/TRAINING PROGRAM

## 2025-07-31 PROCEDURE — 36415 COLL VENOUS BLD VENIPUNCTURE: CPT | Performed by: INTERNAL MEDICINE

## 2025-07-31 PROCEDURE — 87517 HEPATITIS B DNA QUANT: CPT

## 2025-07-31 PROCEDURE — 25000003 PHARM REV CODE 250

## 2025-07-31 PROCEDURE — 87341 HEP B SURFACE AG NEUTRLZJ IA: CPT | Performed by: INTERNAL MEDICINE

## 2025-07-31 PROCEDURE — 82248 BILIRUBIN DIRECT: CPT

## 2025-07-31 PROCEDURE — 82140 ASSAY OF AMMONIA: CPT

## 2025-07-31 PROCEDURE — 87516 HEPATITIS B DNA AMP PROBE: CPT | Performed by: INTERNAL MEDICINE

## 2025-07-31 PROCEDURE — 63600175 PHARM REV CODE 636 W HCPCS: Performed by: INTERNAL MEDICINE

## 2025-07-31 PROCEDURE — 99024 POSTOP FOLLOW-UP VISIT: CPT | Mod: ,,, | Performed by: STUDENT IN AN ORGANIZED HEALTH CARE EDUCATION/TRAINING PROGRAM

## 2025-07-31 PROCEDURE — 63600175 PHARM REV CODE 636 W HCPCS

## 2025-07-31 PROCEDURE — 20600001 HC STEP DOWN PRIVATE ROOM

## 2025-07-31 PROCEDURE — 86704 HEP B CORE ANTIBODY TOTAL: CPT | Performed by: INTERNAL MEDICINE

## 2025-07-31 PROCEDURE — 80202 ASSAY OF VANCOMYCIN: CPT | Performed by: STUDENT IN AN ORGANIZED HEALTH CARE EDUCATION/TRAINING PROGRAM

## 2025-07-31 PROCEDURE — 80195 ASSAY OF SIROLIMUS: CPT

## 2025-07-31 PROCEDURE — 97535 SELF CARE MNGMENT TRAINING: CPT

## 2025-07-31 PROCEDURE — 97110 THERAPEUTIC EXERCISES: CPT

## 2025-07-31 PROCEDURE — 97116 GAIT TRAINING THERAPY: CPT

## 2025-07-31 PROCEDURE — 25000003 PHARM REV CODE 250: Performed by: INTERNAL MEDICINE

## 2025-07-31 PROCEDURE — 36415 COLL VENOUS BLD VENIPUNCTURE: CPT

## 2025-07-31 RX ORDER — POTASSIUM CHLORIDE 20 MEQ/1
40 TABLET, EXTENDED RELEASE ORAL ONCE
Status: COMPLETED | OUTPATIENT
Start: 2025-07-31 | End: 2025-07-31

## 2025-07-31 RX ADMIN — VALACYCLOVIR HYDROCHLORIDE 500 MG: 500 TABLET, FILM COATED ORAL at 09:07

## 2025-07-31 RX ADMIN — LACTULOSE 20 G: 20 SOLUTION ORAL at 09:07

## 2025-07-31 RX ADMIN — HEPARIN SODIUM 5000 UNITS: 5000 INJECTION INTRAVENOUS; SUBCUTANEOUS at 05:07

## 2025-07-31 RX ADMIN — TENOFOVIR ALAFENAMIDE 25 MG: 25 TABLET ORAL at 05:07

## 2025-07-31 RX ADMIN — SIROLIMUS 1 MG: 1 TABLET ORAL at 05:07

## 2025-07-31 RX ADMIN — RIFAXIMIN 550 MG: 550 TABLET ORAL at 09:07

## 2025-07-31 RX ADMIN — PIPERACILLIN SODIUM AND TAZOBACTAM SODIUM 4.5 G: 4; .5 INJECTION, POWDER, LYOPHILIZED, FOR SOLUTION INTRAVENOUS at 11:07

## 2025-07-31 RX ADMIN — OXYCODONE HYDROCHLORIDE 30 MG: 10 TABLET ORAL at 04:07

## 2025-07-31 RX ADMIN — LACTULOSE 20 G: 20 SOLUTION ORAL at 03:07

## 2025-07-31 RX ADMIN — PIPERACILLIN SODIUM AND TAZOBACTAM SODIUM 4.5 G: 4; .5 INJECTION, POWDER, LYOPHILIZED, FOR SOLUTION INTRAVENOUS at 12:07

## 2025-07-31 RX ADMIN — VANCOMYCIN HYDROCHLORIDE 750 MG: 750 INJECTION, POWDER, LYOPHILIZED, FOR SOLUTION INTRAVENOUS at 01:07

## 2025-07-31 RX ADMIN — HYDROMORPHONE HYDROCHLORIDE 1.5 MG: 1 INJECTION, SOLUTION INTRAMUSCULAR; INTRAVENOUS; SUBCUTANEOUS at 07:07

## 2025-07-31 RX ADMIN — METOPROLOL TARTRATE 12.5 MG: 25 TABLET, FILM COATED ORAL at 08:07

## 2025-07-31 RX ADMIN — HYDROMORPHONE HYDROCHLORIDE 1.5 MG: 1 INJECTION, SOLUTION INTRAMUSCULAR; INTRAVENOUS; SUBCUTANEOUS at 05:07

## 2025-07-31 RX ADMIN — LACTULOSE 200 G: 10 SOLUTION ORAL at 12:07

## 2025-07-31 RX ADMIN — PIPERACILLIN SODIUM AND TAZOBACTAM SODIUM 4.5 G: 4; .5 INJECTION, POWDER, LYOPHILIZED, FOR SOLUTION INTRAVENOUS at 08:07

## 2025-07-31 RX ADMIN — PIPERACILLIN SODIUM AND TAZOBACTAM SODIUM 4.5 G: 4; .5 INJECTION, POWDER, LYOPHILIZED, FOR SOLUTION INTRAVENOUS at 04:07

## 2025-07-31 RX ADMIN — OXYCODONE HYDROCHLORIDE 30 MG: 10 TABLET ORAL at 06:07

## 2025-07-31 RX ADMIN — HEPARIN SODIUM 5000 UNITS: 5000 INJECTION INTRAVENOUS; SUBCUTANEOUS at 01:07

## 2025-07-31 RX ADMIN — LACTULOSE 20 G: 20 SOLUTION ORAL at 08:07

## 2025-07-31 RX ADMIN — RIFAXIMIN 550 MG: 550 TABLET ORAL at 08:07

## 2025-07-31 RX ADMIN — HYDROMORPHONE HYDROCHLORIDE 1.5 MG: 1 INJECTION, SOLUTION INTRAMUSCULAR; INTRAVENOUS; SUBCUTANEOUS at 12:07

## 2025-07-31 RX ADMIN — HYDROMORPHONE HYDROCHLORIDE 1.5 MG: 1 INJECTION, SOLUTION INTRAMUSCULAR; INTRAVENOUS; SUBCUTANEOUS at 10:07

## 2025-07-31 RX ADMIN — HYDROMORPHONE HYDROCHLORIDE 1.5 MG: 1 INJECTION, SOLUTION INTRAMUSCULAR; INTRAVENOUS; SUBCUTANEOUS at 02:07

## 2025-07-31 RX ADMIN — POTASSIUM CHLORIDE 40 MEQ: 1500 TABLET, EXTENDED RELEASE ORAL at 01:07

## 2025-07-31 RX ADMIN — HYDROMORPHONE HYDROCHLORIDE 1.5 MG: 1 INJECTION, SOLUTION INTRAMUSCULAR; INTRAVENOUS; SUBCUTANEOUS at 11:07

## 2025-07-31 RX ADMIN — OXYCODONE HYDROCHLORIDE 30 MG: 10 TABLET ORAL at 12:07

## 2025-08-01 LAB
ABSOLUTE EOSINOPHIL (OHS): 0.24 K/UL
ABSOLUTE MONOCYTE (OHS): 0.64 K/UL (ref 0.3–1)
ABSOLUTE NEUTROPHIL COUNT (OHS): 2.47 K/UL (ref 1.8–7.7)
ALBUMIN SERPL BCP-MCNC: 1.8 G/DL (ref 3.5–5.2)
ALP SERPL-CCNC: 123 UNIT/L (ref 40–150)
ALT SERPL W/O P-5'-P-CCNC: 15 UNIT/L (ref 0–55)
ANION GAP (OHS): 8 MMOL/L (ref 8–16)
AST SERPL-CCNC: 67 UNIT/L (ref 0–50)
BACTERIA SPEC ANAEROBE CULT: NORMAL
BASOPHILS # BLD AUTO: 0.02 K/UL
BASOPHILS NFR BLD AUTO: 0.4 %
BILIRUB DIRECT SERPL-MCNC: 4.5 MG/DL (ref 0.1–0.3)
BILIRUB SERPL-MCNC: 5.9 MG/DL (ref 0.1–1)
BUN SERPL-MCNC: 26 MG/DL (ref 6–20)
CALCIUM SERPL-MCNC: 7.5 MG/DL (ref 8.7–10.5)
CHLORIDE SERPL-SCNC: 103 MMOL/L (ref 95–110)
CO2 SERPL-SCNC: 23 MMOL/L (ref 23–29)
CREAT SERPL-MCNC: 1.8 MG/DL (ref 0.5–1.4)
ERYTHROCYTE [DISTWIDTH] IN BLOOD BY AUTOMATED COUNT: 16 % (ref 11.5–14.5)
GFR SERPLBLD CREATININE-BSD FMLA CKD-EPI: 49 ML/MIN/1.73/M2
GLUCOSE SERPL-MCNC: 105 MG/DL (ref 70–110)
HBV DNA SERPL NAA+PROBE-ACNC: ABNORMAL IU/ML
HBV DNA SERPL NAA+PROBE-ACNC: ABNORMAL [IU]/ML
HBV LOG 10 (OHS): 6 LOG IU/ML
HCT VFR BLD AUTO: 22.9 % (ref 40–54)
HGB BLD-MCNC: 7.5 GM/DL (ref 14–18)
IMM GRANULOCYTES # BLD AUTO: 0.03 K/UL (ref 0–0.04)
IMM GRANULOCYTES NFR BLD AUTO: 0.6 % (ref 0–0.5)
LYMPHOCYTES # BLD AUTO: 1.32 K/UL (ref 1–4.8)
MCH RBC QN AUTO: 32.8 PG (ref 27–31)
MCHC RBC AUTO-ENTMCNC: 32.8 G/DL (ref 32–36)
MCV RBC AUTO: 100 FL (ref 82–98)
NUCLEATED RBC (/100WBC) (OHS): 0 /100 WBC
PLATELET # BLD AUTO: 98 K/UL (ref 150–450)
PMV BLD AUTO: 9.8 FL (ref 9.2–12.9)
POTASSIUM SERPL-SCNC: 3 MMOL/L (ref 3.5–5.1)
PROT SERPL-MCNC: 5.7 GM/DL (ref 6–8.4)
RBC # BLD AUTO: 2.29 M/UL (ref 4.6–6.2)
RELATIVE EOSINOPHIL (OHS): 5.1 %
RELATIVE LYMPHOCYTE (OHS): 28 % (ref 18–48)
RELATIVE MONOCYTE (OHS): 13.6 % (ref 4–15)
RELATIVE NEUTROPHIL (OHS): 52.3 % (ref 38–73)
SIROLIMUS BLD-MCNC: 5.5 NG/ML (ref 4–20)
SODIUM SERPL-SCNC: 134 MMOL/L (ref 136–145)
VANCOMYCIN SERPL-MCNC: 15.1 UG/ML (ref ?–80)
WBC # BLD AUTO: 4.72 K/UL (ref 3.9–12.7)

## 2025-08-01 PROCEDURE — 82248 BILIRUBIN DIRECT: CPT

## 2025-08-01 PROCEDURE — 25000003 PHARM REV CODE 250: Performed by: INTERNAL MEDICINE

## 2025-08-01 PROCEDURE — 85025 COMPLETE CBC W/AUTO DIFF WBC: CPT | Performed by: STUDENT IN AN ORGANIZED HEALTH CARE EDUCATION/TRAINING PROGRAM

## 2025-08-01 PROCEDURE — 36415 COLL VENOUS BLD VENIPUNCTURE: CPT

## 2025-08-01 PROCEDURE — 63600175 PHARM REV CODE 636 W HCPCS

## 2025-08-01 PROCEDURE — 20600001 HC STEP DOWN PRIVATE ROOM

## 2025-08-01 PROCEDURE — G0545 PR VISIT INHERENT TO INPT OR OBS CARE, INFECTIOUS DISEASE: HCPCS | Mod: ,,, | Performed by: INTERNAL MEDICINE

## 2025-08-01 PROCEDURE — 80202 ASSAY OF VANCOMYCIN: CPT | Performed by: INTERNAL MEDICINE

## 2025-08-01 PROCEDURE — 80195 ASSAY OF SIROLIMUS: CPT

## 2025-08-01 PROCEDURE — 25000003 PHARM REV CODE 250

## 2025-08-01 PROCEDURE — 99223 1ST HOSP IP/OBS HIGH 75: CPT | Mod: ,,, | Performed by: INTERNAL MEDICINE

## 2025-08-01 PROCEDURE — 80053 COMPREHEN METABOLIC PANEL: CPT | Performed by: STUDENT IN AN ORGANIZED HEALTH CARE EDUCATION/TRAINING PROGRAM

## 2025-08-01 RX ORDER — POTASSIUM CHLORIDE 20 MEQ/1
40 TABLET, EXTENDED RELEASE ORAL 2 TIMES DAILY
Status: DISCONTINUED | OUTPATIENT
Start: 2025-08-01 | End: 2025-08-01

## 2025-08-01 RX ORDER — DIAZEPAM 5 MG/1
5 TABLET ORAL NIGHTLY PRN
Status: DISCONTINUED | OUTPATIENT
Start: 2025-08-01 | End: 2025-08-05 | Stop reason: HOSPADM

## 2025-08-01 RX ORDER — POTASSIUM CHLORIDE 20 MEQ/1
40 TABLET, EXTENDED RELEASE ORAL 3 TIMES DAILY
Status: COMPLETED | OUTPATIENT
Start: 2025-08-01 | End: 2025-08-01

## 2025-08-01 RX ADMIN — POTASSIUM CHLORIDE 40 MEQ: 1500 TABLET, EXTENDED RELEASE ORAL at 08:08

## 2025-08-01 RX ADMIN — LACTULOSE 20 G: 20 SOLUTION ORAL at 08:08

## 2025-08-01 RX ADMIN — OXYCODONE HYDROCHLORIDE 30 MG: 10 TABLET ORAL at 12:08

## 2025-08-01 RX ADMIN — RIFAXIMIN 550 MG: 550 TABLET ORAL at 08:08

## 2025-08-01 RX ADMIN — HYDROMORPHONE HYDROCHLORIDE 1.5 MG: 1 INJECTION, SOLUTION INTRAMUSCULAR; INTRAVENOUS; SUBCUTANEOUS at 03:08

## 2025-08-01 RX ADMIN — SIROLIMUS 1 MG: 1 TABLET ORAL at 06:08

## 2025-08-01 RX ADMIN — LACTULOSE 20 G: 20 SOLUTION ORAL at 03:08

## 2025-08-01 RX ADMIN — OXYCODONE HYDROCHLORIDE 30 MG: 10 TABLET ORAL at 06:08

## 2025-08-01 RX ADMIN — HYDROMORPHONE HYDROCHLORIDE 1.5 MG: 1 INJECTION, SOLUTION INTRAMUSCULAR; INTRAVENOUS; SUBCUTANEOUS at 07:08

## 2025-08-01 RX ADMIN — VALACYCLOVIR HYDROCHLORIDE 500 MG: 500 TABLET, FILM COATED ORAL at 08:08

## 2025-08-01 RX ADMIN — PIPERACILLIN SODIUM AND TAZOBACTAM SODIUM 4.5 G: 4; .5 INJECTION, POWDER, LYOPHILIZED, FOR SOLUTION INTRAVENOUS at 08:08

## 2025-08-01 RX ADMIN — HEPARIN SODIUM 5000 UNITS: 5000 INJECTION INTRAVENOUS; SUBCUTANEOUS at 03:08

## 2025-08-01 RX ADMIN — HYDROMORPHONE HYDROCHLORIDE 1.5 MG: 1 INJECTION, SOLUTION INTRAMUSCULAR; INTRAVENOUS; SUBCUTANEOUS at 11:08

## 2025-08-01 RX ADMIN — POTASSIUM CHLORIDE 40 MEQ: 1500 TABLET, EXTENDED RELEASE ORAL at 03:08

## 2025-08-01 RX ADMIN — PIPERACILLIN SODIUM AND TAZOBACTAM SODIUM 4.5 G: 4; .5 INJECTION, POWDER, LYOPHILIZED, FOR SOLUTION INTRAVENOUS at 04:08

## 2025-08-01 RX ADMIN — TENOFOVIR ALAFENAMIDE 25 MG: 25 TABLET ORAL at 06:08

## 2025-08-01 RX ADMIN — DIAZEPAM 5 MG: 5 TABLET ORAL at 09:08

## 2025-08-02 LAB
ABSOLUTE EOSINOPHIL (OHS): 0.22 K/UL
ABSOLUTE MONOCYTE (OHS): 0.53 K/UL (ref 0.3–1)
ABSOLUTE NEUTROPHIL COUNT (OHS): 1.81 K/UL (ref 1.8–7.7)
ALBUMIN SERPL BCP-MCNC: 1.9 G/DL (ref 3.5–5.2)
ALP SERPL-CCNC: 137 UNIT/L (ref 40–150)
ALT SERPL W/O P-5'-P-CCNC: 17 UNIT/L (ref 0–55)
ANION GAP (OHS): 9 MMOL/L (ref 8–16)
AST SERPL-CCNC: 70 UNIT/L (ref 0–50)
BASOPHILS # BLD AUTO: 0.02 K/UL
BASOPHILS NFR BLD AUTO: 0.5 %
BILIRUB DIRECT SERPL-MCNC: 4.5 MG/DL (ref 0.1–0.3)
BILIRUB SERPL-MCNC: 6.1 MG/DL (ref 0.1–1)
BUN SERPL-MCNC: 20 MG/DL (ref 6–20)
CALCIUM SERPL-MCNC: 7.6 MG/DL (ref 8.7–10.5)
CHLORIDE SERPL-SCNC: 105 MMOL/L (ref 95–110)
CO2 SERPL-SCNC: 22 MMOL/L (ref 23–29)
CREAT SERPL-MCNC: 1.6 MG/DL (ref 0.5–1.4)
ERYTHROCYTE [DISTWIDTH] IN BLOOD BY AUTOMATED COUNT: 15.9 % (ref 11.5–14.5)
GFR SERPLBLD CREATININE-BSD FMLA CKD-EPI: 57 ML/MIN/1.73/M2
GLUCOSE SERPL-MCNC: 106 MG/DL (ref 70–110)
HCT VFR BLD AUTO: 23.1 % (ref 40–54)
HGB BLD-MCNC: 7.5 GM/DL (ref 14–18)
IMM GRANULOCYTES # BLD AUTO: 0.03 K/UL (ref 0–0.04)
IMM GRANULOCYTES NFR BLD AUTO: 0.8 % (ref 0–0.5)
LYMPHOCYTES # BLD AUTO: 1.07 K/UL (ref 1–4.8)
MCH RBC QN AUTO: 33.3 PG (ref 27–31)
MCHC RBC AUTO-ENTMCNC: 32.5 G/DL (ref 32–36)
MCV RBC AUTO: 103 FL (ref 82–98)
NUCLEATED RBC (/100WBC) (OHS): 0 /100 WBC
PLATELET # BLD AUTO: 87 K/UL (ref 150–450)
PMV BLD AUTO: 9.6 FL (ref 9.2–12.9)
POTASSIUM SERPL-SCNC: 3.6 MMOL/L (ref 3.5–5.1)
PROT SERPL-MCNC: 5.9 GM/DL (ref 6–8.4)
RBC # BLD AUTO: 2.25 M/UL (ref 4.6–6.2)
RELATIVE EOSINOPHIL (OHS): 6 %
RELATIVE LYMPHOCYTE (OHS): 29.1 % (ref 18–48)
RELATIVE MONOCYTE (OHS): 14.4 % (ref 4–15)
RELATIVE NEUTROPHIL (OHS): 49.2 % (ref 38–73)
SIROLIMUS BLD-MCNC: 6.4 NG/ML (ref 4–20)
SODIUM SERPL-SCNC: 136 MMOL/L (ref 136–145)
WBC # BLD AUTO: 3.68 K/UL (ref 3.9–12.7)

## 2025-08-02 PROCEDURE — 84460 ALANINE AMINO (ALT) (SGPT): CPT | Performed by: STUDENT IN AN ORGANIZED HEALTH CARE EDUCATION/TRAINING PROGRAM

## 2025-08-02 PROCEDURE — 25000003 PHARM REV CODE 250: Performed by: INTERNAL MEDICINE

## 2025-08-02 PROCEDURE — 63600175 PHARM REV CODE 636 W HCPCS: Performed by: INTERNAL MEDICINE

## 2025-08-02 PROCEDURE — 63600175 PHARM REV CODE 636 W HCPCS

## 2025-08-02 PROCEDURE — 85025 COMPLETE CBC W/AUTO DIFF WBC: CPT | Performed by: STUDENT IN AN ORGANIZED HEALTH CARE EDUCATION/TRAINING PROGRAM

## 2025-08-02 PROCEDURE — 25000003 PHARM REV CODE 250

## 2025-08-02 PROCEDURE — 80195 ASSAY OF SIROLIMUS: CPT

## 2025-08-02 PROCEDURE — 94761 N-INVAS EAR/PLS OXIMETRY MLT: CPT

## 2025-08-02 PROCEDURE — 36415 COLL VENOUS BLD VENIPUNCTURE: CPT

## 2025-08-02 PROCEDURE — 82248 BILIRUBIN DIRECT: CPT

## 2025-08-02 PROCEDURE — 20600001 HC STEP DOWN PRIVATE ROOM

## 2025-08-02 RX ADMIN — LACTULOSE 20 G: 20 SOLUTION ORAL at 08:08

## 2025-08-02 RX ADMIN — HYDROMORPHONE HYDROCHLORIDE 1.5 MG: 1 INJECTION, SOLUTION INTRAMUSCULAR; INTRAVENOUS; SUBCUTANEOUS at 04:08

## 2025-08-02 RX ADMIN — HYDROMORPHONE HYDROCHLORIDE 1.5 MG: 1 INJECTION, SOLUTION INTRAMUSCULAR; INTRAVENOUS; SUBCUTANEOUS at 03:08

## 2025-08-02 RX ADMIN — OXYCODONE HYDROCHLORIDE 30 MG: 10 TABLET ORAL at 06:08

## 2025-08-02 RX ADMIN — VALACYCLOVIR HYDROCHLORIDE 500 MG: 500 TABLET, FILM COATED ORAL at 07:08

## 2025-08-02 RX ADMIN — PIPERACILLIN SODIUM AND TAZOBACTAM SODIUM 4.5 G: 4; .5 INJECTION, POWDER, FOR SOLUTION INTRAVENOUS at 05:08

## 2025-08-02 RX ADMIN — RIFAXIMIN 550 MG: 550 TABLET ORAL at 07:08

## 2025-08-02 RX ADMIN — LACTULOSE 20 G: 20 SOLUTION ORAL at 07:08

## 2025-08-02 RX ADMIN — OXYCODONE HYDROCHLORIDE 30 MG: 10 TABLET ORAL at 07:08

## 2025-08-02 RX ADMIN — PIPERACILLIN SODIUM AND TAZOBACTAM SODIUM 4.5 G: 4; .5 INJECTION, POWDER, FOR SOLUTION INTRAVENOUS at 12:08

## 2025-08-02 RX ADMIN — OXYCODONE HYDROCHLORIDE 30 MG: 10 TABLET ORAL at 12:08

## 2025-08-02 RX ADMIN — LACTULOSE 20 G: 20 SOLUTION ORAL at 02:08

## 2025-08-02 RX ADMIN — SIROLIMUS 1 MG: 1 TABLET ORAL at 06:08

## 2025-08-02 RX ADMIN — HYDROMORPHONE HYDROCHLORIDE 1.5 MG: 1 INJECTION, SOLUTION INTRAMUSCULAR; INTRAVENOUS; SUBCUTANEOUS at 07:08

## 2025-08-02 RX ADMIN — DIAZEPAM 5 MG: 5 TABLET ORAL at 08:08

## 2025-08-02 RX ADMIN — PIPERACILLIN SODIUM AND TAZOBACTAM SODIUM 4.5 G: 4; .5 INJECTION, POWDER, FOR SOLUTION INTRAVENOUS at 08:08

## 2025-08-02 RX ADMIN — RIFAXIMIN 550 MG: 550 TABLET ORAL at 08:08

## 2025-08-02 RX ADMIN — TENOFOVIR ALAFENAMIDE 25 MG: 25 TABLET ORAL at 06:08

## 2025-08-02 RX ADMIN — OXYCODONE HYDROCHLORIDE 30 MG: 10 TABLET ORAL at 01:08

## 2025-08-02 RX ADMIN — HYDROMORPHONE HYDROCHLORIDE 1.5 MG: 1 INJECTION, SOLUTION INTRAMUSCULAR; INTRAVENOUS; SUBCUTANEOUS at 12:08

## 2025-08-02 RX ADMIN — HYDROMORPHONE HYDROCHLORIDE 1.5 MG: 1 INJECTION, SOLUTION INTRAMUSCULAR; INTRAVENOUS; SUBCUTANEOUS at 08:08

## 2025-08-03 LAB
ABSOLUTE EOSINOPHIL (OHS): 0.18 K/UL
ABSOLUTE MONOCYTE (OHS): 0.62 K/UL (ref 0.3–1)
ABSOLUTE NEUTROPHIL COUNT (OHS): 1.7 K/UL (ref 1.8–7.7)
ALBUMIN SERPL BCP-MCNC: 1.7 G/DL (ref 3.5–5.2)
ALP SERPL-CCNC: 134 UNIT/L (ref 40–150)
ALT SERPL W/O P-5'-P-CCNC: 14 UNIT/L (ref 0–55)
ANION GAP (OHS): 6 MMOL/L (ref 8–16)
AST SERPL-CCNC: 62 UNIT/L (ref 0–50)
BACTERIA BLD CULT: NORMAL
BASOPHILS # BLD AUTO: 0.02 K/UL
BASOPHILS NFR BLD AUTO: 0.6 %
BILIRUB DIRECT SERPL-MCNC: 4.5 MG/DL (ref 0.1–0.3)
BILIRUB SERPL-MCNC: 5.9 MG/DL (ref 0.1–1)
BUN SERPL-MCNC: 17 MG/DL (ref 6–20)
CALCIUM SERPL-MCNC: 7.4 MG/DL (ref 8.7–10.5)
CHLORIDE SERPL-SCNC: 105 MMOL/L (ref 95–110)
CO2 SERPL-SCNC: 23 MMOL/L (ref 23–29)
CREAT SERPL-MCNC: 1.6 MG/DL (ref 0.5–1.4)
ERYTHROCYTE [DISTWIDTH] IN BLOOD BY AUTOMATED COUNT: 16 % (ref 11.5–14.5)
GFR SERPLBLD CREATININE-BSD FMLA CKD-EPI: 57 ML/MIN/1.73/M2
GLUCOSE SERPL-MCNC: 97 MG/DL (ref 70–110)
HCT VFR BLD AUTO: 21.5 % (ref 40–54)
HGB BLD-MCNC: 6.9 GM/DL (ref 14–18)
IMM GRANULOCYTES # BLD AUTO: 0.03 K/UL (ref 0–0.04)
IMM GRANULOCYTES NFR BLD AUTO: 0.9 % (ref 0–0.5)
LYMPHOCYTES # BLD AUTO: 0.97 K/UL (ref 1–4.8)
MCH RBC QN AUTO: 32.5 PG (ref 27–31)
MCHC RBC AUTO-ENTMCNC: 32.1 G/DL (ref 32–36)
MCV RBC AUTO: 101 FL (ref 82–98)
NUCLEATED RBC (/100WBC) (OHS): 0 /100 WBC
PLATELET # BLD AUTO: 78 K/UL (ref 150–450)
PMV BLD AUTO: 9.8 FL (ref 9.2–12.9)
POTASSIUM SERPL-SCNC: 3.4 MMOL/L (ref 3.5–5.1)
PROT SERPL-MCNC: 5.5 GM/DL (ref 6–8.4)
RBC # BLD AUTO: 2.12 M/UL (ref 4.6–6.2)
RELATIVE EOSINOPHIL (OHS): 5.1 %
RELATIVE LYMPHOCYTE (OHS): 27.6 % (ref 18–48)
RELATIVE MONOCYTE (OHS): 17.6 % (ref 4–15)
RELATIVE NEUTROPHIL (OHS): 48.2 % (ref 38–73)
SIROLIMUS BLD-MCNC: 5.1 NG/ML (ref 4–20)
SODIUM SERPL-SCNC: 134 MMOL/L (ref 136–145)
WBC # BLD AUTO: 3.52 K/UL (ref 3.9–12.7)

## 2025-08-03 PROCEDURE — 63600175 PHARM REV CODE 636 W HCPCS

## 2025-08-03 PROCEDURE — 25000003 PHARM REV CODE 250

## 2025-08-03 PROCEDURE — 80195 ASSAY OF SIROLIMUS: CPT

## 2025-08-03 PROCEDURE — 94761 N-INVAS EAR/PLS OXIMETRY MLT: CPT

## 2025-08-03 PROCEDURE — 25000003 PHARM REV CODE 250: Performed by: INTERNAL MEDICINE

## 2025-08-03 PROCEDURE — 82040 ASSAY OF SERUM ALBUMIN: CPT | Performed by: STUDENT IN AN ORGANIZED HEALTH CARE EDUCATION/TRAINING PROGRAM

## 2025-08-03 PROCEDURE — 20600001 HC STEP DOWN PRIVATE ROOM

## 2025-08-03 PROCEDURE — 85025 COMPLETE CBC W/AUTO DIFF WBC: CPT | Performed by: STUDENT IN AN ORGANIZED HEALTH CARE EDUCATION/TRAINING PROGRAM

## 2025-08-03 PROCEDURE — 63600175 PHARM REV CODE 636 W HCPCS: Performed by: INTERNAL MEDICINE

## 2025-08-03 PROCEDURE — 82248 BILIRUBIN DIRECT: CPT

## 2025-08-03 PROCEDURE — 36415 COLL VENOUS BLD VENIPUNCTURE: CPT

## 2025-08-03 RX ORDER — POTASSIUM CHLORIDE 20 MEQ/1
40 TABLET, EXTENDED RELEASE ORAL ONCE
Status: COMPLETED | OUTPATIENT
Start: 2025-08-03 | End: 2025-08-03

## 2025-08-03 RX ADMIN — POTASSIUM CHLORIDE 40 MEQ: 1500 TABLET, EXTENDED RELEASE ORAL at 09:08

## 2025-08-03 RX ADMIN — HYDROMORPHONE HYDROCHLORIDE 1.5 MG: 1 INJECTION, SOLUTION INTRAMUSCULAR; INTRAVENOUS; SUBCUTANEOUS at 12:08

## 2025-08-03 RX ADMIN — RIFAXIMIN 550 MG: 550 TABLET ORAL at 09:08

## 2025-08-03 RX ADMIN — OXYCODONE HYDROCHLORIDE 30 MG: 10 TABLET ORAL at 06:08

## 2025-08-03 RX ADMIN — HYDROMORPHONE HYDROCHLORIDE 1.5 MG: 1 INJECTION, SOLUTION INTRAMUSCULAR; INTRAVENOUS; SUBCUTANEOUS at 04:08

## 2025-08-03 RX ADMIN — HYDROMORPHONE HYDROCHLORIDE 1.5 MG: 1 INJECTION, SOLUTION INTRAMUSCULAR; INTRAVENOUS; SUBCUTANEOUS at 09:08

## 2025-08-03 RX ADMIN — SIROLIMUS 1 MG: 1 TABLET ORAL at 06:08

## 2025-08-03 RX ADMIN — PIPERACILLIN SODIUM AND TAZOBACTAM SODIUM 4.5 G: 4; .5 INJECTION, POWDER, FOR SOLUTION INTRAVENOUS at 05:08

## 2025-08-03 RX ADMIN — OXYCODONE HYDROCHLORIDE 30 MG: 10 TABLET ORAL at 01:08

## 2025-08-03 RX ADMIN — PIPERACILLIN SODIUM AND TAZOBACTAM SODIUM 4.5 G: 4; .5 INJECTION, POWDER, FOR SOLUTION INTRAVENOUS at 09:08

## 2025-08-03 RX ADMIN — HYDROMORPHONE HYDROCHLORIDE 1.5 MG: 1 INJECTION, SOLUTION INTRAMUSCULAR; INTRAVENOUS; SUBCUTANEOUS at 05:08

## 2025-08-03 RX ADMIN — DIAZEPAM 5 MG: 5 TABLET ORAL at 08:08

## 2025-08-03 RX ADMIN — LACTULOSE 20 G: 20 SOLUTION ORAL at 09:08

## 2025-08-03 RX ADMIN — VALACYCLOVIR HYDROCHLORIDE 500 MG: 500 TABLET, FILM COATED ORAL at 09:08

## 2025-08-03 RX ADMIN — HYDROMORPHONE HYDROCHLORIDE 1.5 MG: 1 INJECTION, SOLUTION INTRAMUSCULAR; INTRAVENOUS; SUBCUTANEOUS at 08:08

## 2025-08-03 RX ADMIN — PIPERACILLIN SODIUM AND TAZOBACTAM SODIUM 4.5 G: 4; .5 INJECTION, POWDER, FOR SOLUTION INTRAVENOUS at 12:08

## 2025-08-03 RX ADMIN — RIFAXIMIN 550 MG: 550 TABLET ORAL at 08:08

## 2025-08-03 RX ADMIN — OXYCODONE HYDROCHLORIDE 30 MG: 10 TABLET ORAL at 12:08

## 2025-08-03 RX ADMIN — LIDOCAINE 3 PATCH: 50 PATCH CUTANEOUS at 05:08

## 2025-08-03 RX ADMIN — HYDROMORPHONE HYDROCHLORIDE 1.5 MG: 1 INJECTION, SOLUTION INTRAMUSCULAR; INTRAVENOUS; SUBCUTANEOUS at 01:08

## 2025-08-03 RX ADMIN — LACTULOSE 20 G: 20 SOLUTION ORAL at 08:08

## 2025-08-03 RX ADMIN — LACTULOSE 20 G: 20 SOLUTION ORAL at 02:08

## 2025-08-03 RX ADMIN — TENOFOVIR ALAFENAMIDE 25 MG: 25 TABLET ORAL at 06:08

## 2025-08-04 LAB
ABSOLUTE EOSINOPHIL (OHS): 0.16 K/UL
ABSOLUTE MONOCYTE (OHS): 0.68 K/UL (ref 0.3–1)
ABSOLUTE NEUTROPHIL COUNT (OHS): 3.08 K/UL (ref 1.8–7.7)
ALBUMIN SERPL BCP-MCNC: 1.7 G/DL (ref 3.5–5.2)
ALP SERPL-CCNC: 142 UNIT/L (ref 40–150)
ALT SERPL W/O P-5'-P-CCNC: 12 UNIT/L (ref 0–55)
ANION GAP (OHS): 8 MMOL/L (ref 8–16)
AST SERPL-CCNC: 65 UNIT/L (ref 0–50)
BASOPHILS # BLD AUTO: 0.03 K/UL
BASOPHILS NFR BLD AUTO: 0.6 %
BILIRUB DIRECT SERPL-MCNC: 4.6 MG/DL (ref 0.1–0.3)
BILIRUB SERPL-MCNC: 6.1 MG/DL (ref 0.1–1)
BUN SERPL-MCNC: 15 MG/DL (ref 6–20)
CALCIUM SERPL-MCNC: 7.5 MG/DL (ref 8.7–10.5)
CHLORIDE SERPL-SCNC: 107 MMOL/L (ref 95–110)
CO2 SERPL-SCNC: 21 MMOL/L (ref 23–29)
CREAT SERPL-MCNC: 1.6 MG/DL (ref 0.5–1.4)
ERYTHROCYTE [DISTWIDTH] IN BLOOD BY AUTOMATED COUNT: 16 % (ref 11.5–14.5)
GFR SERPLBLD CREATININE-BSD FMLA CKD-EPI: 57 ML/MIN/1.73/M2
GLUCOSE SERPL-MCNC: 115 MG/DL (ref 70–110)
HCT VFR BLD AUTO: 21.7 % (ref 40–54)
HGB BLD-MCNC: 7.2 GM/DL (ref 14–18)
IMM GRANULOCYTES # BLD AUTO: 0.04 K/UL (ref 0–0.04)
IMM GRANULOCYTES NFR BLD AUTO: 0.8 % (ref 0–0.5)
LYMPHOCYTES # BLD AUTO: 1.11 K/UL (ref 1–4.8)
MCH RBC QN AUTO: 33.5 PG (ref 27–31)
MCHC RBC AUTO-ENTMCNC: 33.2 G/DL (ref 32–36)
MCV RBC AUTO: 101 FL (ref 82–98)
NUCLEATED RBC (/100WBC) (OHS): 0 /100 WBC
PLATELET # BLD AUTO: 85 K/UL (ref 150–450)
PMV BLD AUTO: 10.1 FL (ref 9.2–12.9)
POTASSIUM SERPL-SCNC: 3.1 MMOL/L (ref 3.5–5.1)
PROT SERPL-MCNC: 5.4 GM/DL (ref 6–8.4)
RBC # BLD AUTO: 2.15 M/UL (ref 4.6–6.2)
RELATIVE EOSINOPHIL (OHS): 3.1 %
RELATIVE LYMPHOCYTE (OHS): 21.8 % (ref 18–48)
RELATIVE MONOCYTE (OHS): 13.3 % (ref 4–15)
RELATIVE NEUTROPHIL (OHS): 60.4 % (ref 38–73)
SIROLIMUS BLD-MCNC: 5.2 NG/ML (ref 4–20)
SODIUM SERPL-SCNC: 136 MMOL/L (ref 136–145)
W HEPATITIS B VIRUS DNA, QUALITATIVE: DETECTED
WBC # BLD AUTO: 5.1 K/UL (ref 3.9–12.7)

## 2025-08-04 PROCEDURE — 80195 ASSAY OF SIROLIMUS: CPT

## 2025-08-04 PROCEDURE — 25000003 PHARM REV CODE 250

## 2025-08-04 PROCEDURE — 20600001 HC STEP DOWN PRIVATE ROOM

## 2025-08-04 PROCEDURE — 99900035 HC TECH TIME PER 15 MIN (STAT)

## 2025-08-04 PROCEDURE — 36415 COLL VENOUS BLD VENIPUNCTURE: CPT

## 2025-08-04 PROCEDURE — 63600175 PHARM REV CODE 636 W HCPCS

## 2025-08-04 PROCEDURE — 84075 ASSAY ALKALINE PHOSPHATASE: CPT | Performed by: STUDENT IN AN ORGANIZED HEALTH CARE EDUCATION/TRAINING PROGRAM

## 2025-08-04 PROCEDURE — 63600175 PHARM REV CODE 636 W HCPCS: Performed by: INTERNAL MEDICINE

## 2025-08-04 PROCEDURE — 25000003 PHARM REV CODE 250: Performed by: INTERNAL MEDICINE

## 2025-08-04 PROCEDURE — 85025 COMPLETE CBC W/AUTO DIFF WBC: CPT | Performed by: STUDENT IN AN ORGANIZED HEALTH CARE EDUCATION/TRAINING PROGRAM

## 2025-08-04 PROCEDURE — 82248 BILIRUBIN DIRECT: CPT

## 2025-08-04 RX ORDER — POTASSIUM CHLORIDE 20 MEQ/1
40 TABLET, EXTENDED RELEASE ORAL 2 TIMES DAILY
Status: COMPLETED | OUTPATIENT
Start: 2025-08-04 | End: 2025-08-04

## 2025-08-04 RX ADMIN — RIFAXIMIN 550 MG: 550 TABLET ORAL at 08:08

## 2025-08-04 RX ADMIN — OXYCODONE HYDROCHLORIDE 30 MG: 10 TABLET ORAL at 12:08

## 2025-08-04 RX ADMIN — HYDROMORPHONE HYDROCHLORIDE 1.5 MG: 1 INJECTION, SOLUTION INTRAMUSCULAR; INTRAVENOUS; SUBCUTANEOUS at 09:08

## 2025-08-04 RX ADMIN — LIDOCAINE 3 PATCH: 50 PATCH CUTANEOUS at 05:08

## 2025-08-04 RX ADMIN — HYDROMORPHONE HYDROCHLORIDE 1.5 MG: 1 INJECTION, SOLUTION INTRAMUSCULAR; INTRAVENOUS; SUBCUTANEOUS at 01:08

## 2025-08-04 RX ADMIN — PIPERACILLIN SODIUM AND TAZOBACTAM SODIUM 4.5 G: 4; .5 INJECTION, POWDER, FOR SOLUTION INTRAVENOUS at 08:08

## 2025-08-04 RX ADMIN — POTASSIUM CHLORIDE 40 MEQ: 1500 TABLET, EXTENDED RELEASE ORAL at 08:08

## 2025-08-04 RX ADMIN — OXYCODONE HYDROCHLORIDE 30 MG: 10 TABLET ORAL at 06:08

## 2025-08-04 RX ADMIN — PIPERACILLIN SODIUM AND TAZOBACTAM SODIUM 4.5 G: 4; .5 INJECTION, POWDER, FOR SOLUTION INTRAVENOUS at 05:08

## 2025-08-04 RX ADMIN — DIAZEPAM 5 MG: 5 TABLET ORAL at 09:08

## 2025-08-04 RX ADMIN — TENOFOVIR ALAFENAMIDE 25 MG: 25 TABLET ORAL at 06:08

## 2025-08-04 RX ADMIN — VALACYCLOVIR HYDROCHLORIDE 500 MG: 500 TABLET, FILM COATED ORAL at 08:08

## 2025-08-04 RX ADMIN — HYDROMORPHONE HYDROCHLORIDE 1.5 MG: 1 INJECTION, SOLUTION INTRAMUSCULAR; INTRAVENOUS; SUBCUTANEOUS at 05:08

## 2025-08-04 RX ADMIN — SIROLIMUS 1 MG: 1 TABLET ORAL at 06:08

## 2025-08-04 RX ADMIN — LACTULOSE 20 G: 20 SOLUTION ORAL at 02:08

## 2025-08-04 RX ADMIN — PIPERACILLIN SODIUM AND TAZOBACTAM SODIUM 4.5 G: 4; .5 INJECTION, POWDER, FOR SOLUTION INTRAVENOUS at 12:08

## 2025-08-04 RX ADMIN — LACTULOSE 20 G: 20 SOLUTION ORAL at 09:08

## 2025-08-05 VITALS
TEMPERATURE: 99 F | HEIGHT: 66 IN | OXYGEN SATURATION: 98 % | SYSTOLIC BLOOD PRESSURE: 120 MMHG | RESPIRATION RATE: 23 BRPM | WEIGHT: 141.31 LBS | HEART RATE: 112 BPM | BODY MASS INDEX: 22.71 KG/M2 | DIASTOLIC BLOOD PRESSURE: 55 MMHG

## 2025-08-05 LAB
ALBUMIN SERPL BCP-MCNC: 1.5 G/DL (ref 3.5–5.2)
ALP SERPL-CCNC: 142 UNIT/L (ref 40–150)
ALT SERPL W/O P-5'-P-CCNC: 11 UNIT/L (ref 0–55)
ANION GAP (OHS): 6 MMOL/L (ref 8–16)
ANION GAP (OHS): 7 MMOL/L (ref 8–16)
AST SERPL-CCNC: 63 UNIT/L (ref 0–50)
BILIRUB DIRECT SERPL-MCNC: 4.3 MG/DL (ref 0.1–0.3)
BILIRUB SERPL-MCNC: 5.9 MG/DL (ref 0.1–1)
BUN SERPL-MCNC: 12 MG/DL (ref 6–20)
BUN SERPL-MCNC: 13 MG/DL (ref 6–20)
CALCIUM SERPL-MCNC: 7.3 MG/DL (ref 8.7–10.5)
CALCIUM SERPL-MCNC: 7.6 MG/DL (ref 8.7–10.5)
CHLORIDE SERPL-SCNC: 106 MMOL/L (ref 95–110)
CHLORIDE SERPL-SCNC: 106 MMOL/L (ref 95–110)
CO2 SERPL-SCNC: 19 MMOL/L (ref 23–29)
CO2 SERPL-SCNC: 20 MMOL/L (ref 23–29)
CREAT SERPL-MCNC: 1.5 MG/DL (ref 0.5–1.4)
CREAT SERPL-MCNC: 1.5 MG/DL (ref 0.5–1.4)
GFR SERPLBLD CREATININE-BSD FMLA CKD-EPI: >60 ML/MIN/1.73/M2
GFR SERPLBLD CREATININE-BSD FMLA CKD-EPI: >60 ML/MIN/1.73/M2
GLUCOSE SERPL-MCNC: 105 MG/DL (ref 70–110)
GLUCOSE SERPL-MCNC: 87 MG/DL (ref 70–110)
POTASSIUM SERPL-SCNC: 3.2 MMOL/L (ref 3.5–5.1)
POTASSIUM SERPL-SCNC: 3.9 MMOL/L (ref 3.5–5.1)
PROT SERPL-MCNC: 5.2 GM/DL (ref 6–8.4)
SIROLIMUS BLD-MCNC: 5 NG/ML (ref 4–20)
SODIUM SERPL-SCNC: 132 MMOL/L (ref 136–145)
SODIUM SERPL-SCNC: 132 MMOL/L (ref 136–145)

## 2025-08-05 PROCEDURE — 63600175 PHARM REV CODE 636 W HCPCS: Performed by: INTERNAL MEDICINE

## 2025-08-05 PROCEDURE — 63600175 PHARM REV CODE 636 W HCPCS

## 2025-08-05 PROCEDURE — 25000003 PHARM REV CODE 250: Performed by: INTERNAL MEDICINE

## 2025-08-05 PROCEDURE — 82248 BILIRUBIN DIRECT: CPT

## 2025-08-05 PROCEDURE — 36415 COLL VENOUS BLD VENIPUNCTURE: CPT | Performed by: INTERNAL MEDICINE

## 2025-08-05 PROCEDURE — 80053 COMPREHEN METABOLIC PANEL: CPT | Performed by: STUDENT IN AN ORGANIZED HEALTH CARE EDUCATION/TRAINING PROGRAM

## 2025-08-05 PROCEDURE — 94761 N-INVAS EAR/PLS OXIMETRY MLT: CPT

## 2025-08-05 PROCEDURE — 25000003 PHARM REV CODE 250

## 2025-08-05 PROCEDURE — 82374 ASSAY BLOOD CARBON DIOXIDE: CPT | Performed by: INTERNAL MEDICINE

## 2025-08-05 PROCEDURE — 80195 ASSAY OF SIROLIMUS: CPT

## 2025-08-05 PROCEDURE — 36415 COLL VENOUS BLD VENIPUNCTURE: CPT

## 2025-08-05 RX ORDER — POTASSIUM CHLORIDE 750 MG/1
10 CAPSULE, EXTENDED RELEASE ORAL DAILY
Qty: 7 CAPSULE | Refills: 0 | Status: SHIPPED | OUTPATIENT
Start: 2025-08-05 | End: 2025-08-12

## 2025-08-05 RX ORDER — OXYCODONE HYDROCHLORIDE 30 MG/1
30 TABLET ORAL EVERY 6 HOURS PRN
Qty: 8 TABLET | Refills: 0 | Status: SHIPPED | OUTPATIENT
Start: 2025-08-05 | End: 2025-08-07

## 2025-08-05 RX ORDER — CIPROFLOXACIN 500 MG/1
500 TABLET, FILM COATED ORAL EVERY 12 HOURS
Qty: 3 TABLET | Refills: 0 | Status: SHIPPED | OUTPATIENT
Start: 2025-08-05 | End: 2025-08-07

## 2025-08-05 RX ORDER — POTASSIUM CHLORIDE 7.45 MG/ML
40 INJECTION INTRAVENOUS ONCE
Status: COMPLETED | OUTPATIENT
Start: 2025-08-05 | End: 2025-08-05

## 2025-08-05 RX ORDER — POTASSIUM CHLORIDE 20 MEQ/1
40 TABLET, EXTENDED RELEASE ORAL ONCE
Status: COMPLETED | OUTPATIENT
Start: 2025-08-05 | End: 2025-08-05

## 2025-08-05 RX ORDER — LACTULOSE 10 G/15ML
20 SOLUTION ORAL; RECTAL 3 TIMES DAILY
Qty: 8100 ML | Refills: 0 | Status: SHIPPED | OUTPATIENT
Start: 2025-08-05

## 2025-08-05 RX ADMIN — RIFAXIMIN 550 MG: 550 TABLET ORAL at 09:08

## 2025-08-05 RX ADMIN — OXYCODONE HYDROCHLORIDE 30 MG: 10 TABLET ORAL at 06:08

## 2025-08-05 RX ADMIN — OXYCODONE HYDROCHLORIDE 30 MG: 10 TABLET ORAL at 12:08

## 2025-08-05 RX ADMIN — POTASSIUM CHLORIDE 40 MEQ: 7.46 INJECTION, SOLUTION INTRAVENOUS at 12:08

## 2025-08-05 RX ADMIN — TENOFOVIR ALAFENAMIDE 25 MG: 25 TABLET ORAL at 09:08

## 2025-08-05 RX ADMIN — VALACYCLOVIR HYDROCHLORIDE 500 MG: 500 TABLET, FILM COATED ORAL at 09:08

## 2025-08-05 RX ADMIN — HYDROMORPHONE HYDROCHLORIDE 1.5 MG: 1 INJECTION, SOLUTION INTRAMUSCULAR; INTRAVENOUS; SUBCUTANEOUS at 07:08

## 2025-08-05 RX ADMIN — POTASSIUM CHLORIDE 40 MEQ: 1500 TABLET, EXTENDED RELEASE ORAL at 09:08

## 2025-08-05 RX ADMIN — POTASSIUM CHLORIDE 40 MEQ: 7.46 INJECTION, SOLUTION INTRAVENOUS at 11:08

## 2025-08-05 RX ADMIN — POTASSIUM CHLORIDE 40 MEQ: 7.46 INJECTION, SOLUTION INTRAVENOUS at 10:08

## 2025-08-05 RX ADMIN — HYDROMORPHONE HYDROCHLORIDE 1.5 MG: 1 INJECTION, SOLUTION INTRAMUSCULAR; INTRAVENOUS; SUBCUTANEOUS at 11:08

## 2025-08-05 RX ADMIN — HYDROMORPHONE HYDROCHLORIDE 1.5 MG: 1 INJECTION, SOLUTION INTRAMUSCULAR; INTRAVENOUS; SUBCUTANEOUS at 03:08

## 2025-08-05 RX ADMIN — LACTULOSE 20 G: 20 SOLUTION ORAL at 09:08

## 2025-08-05 RX ADMIN — POTASSIUM CHLORIDE 10 MEQ: 7.46 INJECTION, SOLUTION INTRAVENOUS at 09:08

## 2025-08-05 RX ADMIN — SIROLIMUS 1 MG: 1 TABLET ORAL at 09:08

## 2025-08-05 RX ADMIN — PIPERACILLIN SODIUM AND TAZOBACTAM SODIUM 4.5 G: 4; .5 INJECTION, POWDER, FOR SOLUTION INTRAVENOUS at 12:08

## 2025-08-06 ENCOUNTER — TELEPHONE (OUTPATIENT)
Dept: TRANSPLANT | Facility: CLINIC | Age: 35
End: 2025-08-06
Payer: MEDICARE

## 2025-08-06 LAB — FUNGUS SPEC CULT: NORMAL

## 2025-08-07 LAB
BACTERIA BLD CULT: NORMAL
BACTERIA SPEC ANAEROBE CULT: NORMAL

## 2025-08-10 ENCOUNTER — PATIENT MESSAGE (OUTPATIENT)
Dept: CARDIOTHORACIC SURGERY | Facility: CLINIC | Age: 35
End: 2025-08-10
Payer: MEDICARE

## 2025-08-11 ENCOUNTER — TELEPHONE (OUTPATIENT)
Dept: TRANSPLANT | Facility: CLINIC | Age: 35
End: 2025-08-11
Payer: MEDICARE

## 2025-08-11 LAB
FUNGUS SPEC CULT: NORMAL
FUNGUS SPEC CULT: NORMAL

## 2025-08-12 ENCOUNTER — PATIENT MESSAGE (OUTPATIENT)
Dept: TRANSPLANT | Facility: CLINIC | Age: 35
End: 2025-08-12
Payer: MEDICARE

## 2025-08-12 ENCOUNTER — TELEPHONE (OUTPATIENT)
Dept: CARDIOTHORACIC SURGERY | Facility: CLINIC | Age: 35
End: 2025-08-12
Payer: MEDICARE

## 2025-08-12 ENCOUNTER — PATIENT MESSAGE (OUTPATIENT)
Dept: CARDIOTHORACIC SURGERY | Facility: CLINIC | Age: 35
End: 2025-08-12
Payer: MEDICARE

## 2025-08-14 LAB
MYCOBACTERIUM SPEC QL CULT: NORMAL

## 2025-08-15 ENCOUNTER — PATIENT MESSAGE (OUTPATIENT)
Dept: TRANSPLANT | Facility: CLINIC | Age: 35
End: 2025-08-15
Payer: MEDICARE

## 2025-08-16 ENCOUNTER — PATIENT MESSAGE (OUTPATIENT)
Dept: CARDIOTHORACIC SURGERY | Facility: CLINIC | Age: 35
End: 2025-08-16
Payer: MEDICARE

## 2025-08-16 LAB — MYCOBACTERIUM SPEC QL CULT: NORMAL

## 2025-08-18 ENCOUNTER — RESULTS FOLLOW-UP (OUTPATIENT)
Dept: HEPATOLOGY | Facility: CLINIC | Age: 35
End: 2025-08-18
Payer: MEDICARE

## 2025-08-18 LAB
EXT ALBUMIN: 2.7
EXT ALKALINE PHOSPHATASE: 186
EXT ALT: 25
EXT AST: 81
EXT BASOPHIL%: 0.7
EXT BILIRUBIN TOTAL: 6.3
EXT BUN: 26
EXT CALCIUM: 8.9
EXT CHLORIDE: 99
EXT CO2: 30
EXT CREATININE: 1.94 MG/DL
EXT EOSINOPHIL%: 7.5
EXT GFR MDRD NON AF AMER: 45
EXT GLUCOSE: 107
EXT HBV DNA QUANT PCR: NORMAL
EXT HEMATOCRIT: 24.1
EXT HEMOGLOBIN: 8
EXT INR: 1.4
EXT LYMPH%: 24.2
EXT MONOCYTES%: 10.8
EXT PLATELETS: 78
EXT POTASSIUM: 3.9
EXT PROTEIN TOTAL: 6.3
EXT PT: 16.6
EXT SEGS%: 56.9
EXT SIROLIMUS LVL: 5.6
EXT SODIUM: 135 MMOL/L
EXT WBC: 4.3
HBV DNA SERPL NAA+PROBE-LOG IU: 5.28 LOGIU/ML

## 2025-08-21 ENCOUNTER — PATIENT MESSAGE (OUTPATIENT)
Dept: TRANSPLANT | Facility: CLINIC | Age: 35
End: 2025-08-21

## 2025-08-21 ENCOUNTER — TELEPHONE (OUTPATIENT)
Dept: TRANSPLANT | Facility: CLINIC | Age: 35
End: 2025-08-21
Payer: MEDICARE

## 2025-08-21 ENCOUNTER — OFFICE VISIT (OUTPATIENT)
Dept: TRANSPLANT | Facility: CLINIC | Age: 35
End: 2025-08-21
Payer: MEDICARE

## 2025-08-21 DIAGNOSIS — B16.9 ACUTE HEPATITIS B: ICD-10-CM

## 2025-08-21 DIAGNOSIS — Z94.4 HISTORY OF LIVER TRANSPLANT: ICD-10-CM

## 2025-08-21 DIAGNOSIS — Z98.890 S/P VIDEO-ASSISTED THORACOSCOPIC SURGERY (VATS): ICD-10-CM

## 2025-08-21 DIAGNOSIS — R60.0 BILATERAL LOWER EXTREMITY EDEMA: ICD-10-CM

## 2025-08-21 DIAGNOSIS — J90 PLEURAL EFFUSION: ICD-10-CM

## 2025-08-21 DIAGNOSIS — K83.1 BILIARY STRICTURE OF TRANSPLANTED LIVER: ICD-10-CM

## 2025-08-21 DIAGNOSIS — T86.49 BILIARY STRICTURE OF TRANSPLANTED LIVER: ICD-10-CM

## 2025-08-21 DIAGNOSIS — Z91.89 AT RISK FOR OPPORTUNISTIC INFECTIONS: ICD-10-CM

## 2025-08-21 DIAGNOSIS — K76.82 HEPATIC ENCEPHALOPATHY: ICD-10-CM

## 2025-08-21 DIAGNOSIS — F10.21 ALCOHOL USE DISORDER, SEVERE, IN SUSTAINED REMISSION: ICD-10-CM

## 2025-08-21 DIAGNOSIS — K76.6 PORTAL HYPERTENSION: ICD-10-CM

## 2025-08-21 DIAGNOSIS — Z79.60 LONG-TERM USE OF IMMUNOSUPPRESSANT MEDICATION: Chronic | ICD-10-CM

## 2025-08-21 DIAGNOSIS — J90 PLEURAL EFFUSION: Primary | ICD-10-CM

## 2025-08-21 RX ORDER — TORSEMIDE 20 MG/1
20 TABLET ORAL DAILY
Qty: 30 TABLET | Refills: 11 | Status: SHIPPED | OUTPATIENT
Start: 2025-08-21 | End: 2026-08-21

## 2025-08-22 ENCOUNTER — TELEPHONE (OUTPATIENT)
Dept: TRANSPLANT | Facility: CLINIC | Age: 35
End: 2025-08-22
Payer: MEDICARE

## 2025-08-25 ENCOUNTER — PATIENT MESSAGE (OUTPATIENT)
Dept: TRANSPLANT | Facility: CLINIC | Age: 35
End: 2025-08-25
Payer: MEDICARE

## 2025-08-25 ENCOUNTER — OFFICE VISIT (OUTPATIENT)
Dept: CARDIOTHORACIC SURGERY | Facility: CLINIC | Age: 35
End: 2025-08-25
Payer: MEDICARE

## 2025-08-25 ENCOUNTER — HOSPITAL ENCOUNTER (OUTPATIENT)
Dept: RADIOLOGY | Facility: HOSPITAL | Age: 35
Discharge: HOME | End: 2025-08-25
Attending: STUDENT IN AN ORGANIZED HEALTH CARE EDUCATION/TRAINING PROGRAM
Payer: MEDICARE

## 2025-08-25 VITALS
WEIGHT: 155.63 LBS | HEART RATE: 59 BPM | OXYGEN SATURATION: 97 % | DIASTOLIC BLOOD PRESSURE: 54 MMHG | SYSTOLIC BLOOD PRESSURE: 92 MMHG | HEIGHT: 66 IN | BODY MASS INDEX: 25.01 KG/M2

## 2025-08-25 DIAGNOSIS — J90 PLEURAL EFFUSION: Primary | ICD-10-CM

## 2025-08-25 DIAGNOSIS — J90 PLEURAL EFFUSION: ICD-10-CM

## 2025-08-25 PROCEDURE — 71046 X-RAY EXAM CHEST 2 VIEWS: CPT | Mod: TC

## 2025-08-25 PROCEDURE — 99999 PR PBB SHADOW E&M-EST. PATIENT-LVL III: CPT | Mod: PBBFAC,,, | Performed by: STUDENT IN AN ORGANIZED HEALTH CARE EDUCATION/TRAINING PROGRAM

## 2025-08-25 PROCEDURE — 71046 X-RAY EXAM CHEST 2 VIEWS: CPT | Mod: 26,,, | Performed by: RADIOLOGY

## 2025-08-25 RX ORDER — LACTULOSE 10 G/15ML
20 SOLUTION ORAL; RECTAL 3 TIMES DAILY
Qty: 8100 ML | Refills: 0 | Status: SHIPPED | OUTPATIENT
Start: 2025-08-25

## 2025-08-25 RX ORDER — AMOXICILLIN AND CLAVULANATE POTASSIUM 875; 125 MG/1; MG/1
1 TABLET, FILM COATED ORAL EVERY 12 HOURS
Qty: 8 TABLET | Refills: 0 | Status: SHIPPED | OUTPATIENT
Start: 2025-08-25 | End: 2025-08-29

## 2025-08-26 ENCOUNTER — RESULTS FOLLOW-UP (OUTPATIENT)
Dept: TRANSPLANT | Facility: CLINIC | Age: 35
End: 2025-08-26
Payer: MEDICARE

## 2025-08-26 LAB
EXT ALBUMIN: 2.6
EXT ALKALINE PHOSPHATASE: 160
EXT ALT: 24
EXT AST: 75
EXT BASOPHIL%: 0.5
EXT BILIRUBIN TOTAL: 7
EXT BUN: 31
EXT CALCIUM: 8.8
EXT CHLORIDE: 98
EXT CO2: 24
EXT CREATININE: 2.09 MG/DL
EXT EGFR NO RACE VARIABLE: 41
EXT EOSINOPHIL%: 7.1
EXT GLUCOSE: 90
EXT HBV DNA QUANT PCR: NORMAL
EXT HEMATOCRIT: 27
EXT HEMOGLOBIN: 8.9
EXT INR: 1.4
EXT LYMPH%: 22.9
EXT MONOCYTES%: 14.4
EXT PLATELETS: 94
EXT POTASSIUM: 3.9
EXT PROTEIN TOTAL: 6
EXT PT: 16.7
EXT SEGS%: 55.1
EXT SIROLIMUS LVL: 6.5
EXT SODIUM: 130 MMOL/L
EXT WBC: 5.6
HBV DNA SERPL NAA+PROBE-LOG IU: 5.27 LOGIU/ML

## 2025-08-28 ENCOUNTER — RESULTS FOLLOW-UP (OUTPATIENT)
Dept: HEPATOLOGY | Facility: CLINIC | Age: 35
End: 2025-08-28
Payer: MEDICARE

## 2025-08-28 LAB
EXT ALBUMIN: 3
EXT ALKALINE PHOSPHATASE: 213
EXT ALT: 38
EXT AST: 109
EXT BASOPHIL%: 0.3
EXT BILIRUBIN TOTAL: 8.1
EXT BUN: 45
EXT CALCIUM: 8.9
EXT CHLORIDE: 96
EXT CO2: 25
EXT CREATININE: 2.35 MG/DL
EXT EGFR NO RACE VARIABLE: 36
EXT EOSINOPHIL%: 2.8
EXT GLUCOSE: 109
EXT HBV DNA QUANT PCR: NORMAL
EXT HEMATOCRIT: 32.8
EXT HEMOGLOBIN: 10.7
EXT INR: 1.4
EXT LYMPH%: 18.8
EXT MONOCYTES%: 12.2
EXT PLATELETS: 119
EXT POTASSIUM: 3.8
EXT PROTEIN TOTAL: 7
EXT PT: 16.5
EXT SEGS%: 65.9
EXT SIROLIMUS LVL: NORMAL
EXT SODIUM: 132 MMOL/L
EXT WBC: 7.5

## 2025-09-05 LAB
EXT ALBUMIN: 2.8
EXT ALKALINE PHOSPHATASE: 250
EXT ALT: 53
EXT AST: 119
EXT BASOPHIL%: 0.5
EXT BILIRUBIN TOTAL: 6.6
EXT BUN: 26
EXT CALCIUM: 9.2
EXT CHLORIDE: 107
EXT CO2: 20
EXT CREATININE: 1.3 MG/DL
EXT EGFR NO RACE VARIABLE: 73
EXT EOSINOPHIL%: 4.9
EXT GLUCOSE: 135
EXT HBV DNA QUANT PCR: NORMAL
EXT HEMATOCRIT: 30
EXT HEMOGLOBIN: 10.1
EXT INR: 1.4
EXT LYMPH%: 31.7
EXT MONOCYTES%: 10.4
EXT PLATELETS: 58
EXT POTASSIUM: 3.5
EXT PROTEIN TOTAL: 6.6
EXT PT: 15.8
EXT SEGS%: 52.5
EXT SIROLIMUS LVL: NORMAL
EXT SODIUM: 134 MMOL/L
EXT WBC: 4.2

## (undated) DEVICE — DRAIN CHANNEL ROUND 19FR

## (undated) DEVICE — SUT VICRYL CTD 2-0 GI 27 SH

## (undated) DEVICE — HANDSET ARGON PLUS

## (undated) DEVICE — SOL NS 1000CC

## (undated) DEVICE — ELECTRODE REM PLYHSV RETURN 9

## (undated) DEVICE — APPLICATOR CHLORAPREP ORN 26ML

## (undated) DEVICE — SUT SILK 2-0 STRANDS 30IN

## (undated) DEVICE — ADHESIVE MASTISOL VIAL 48/BX

## (undated) DEVICE — BAG INZII TISS RETRV 12/15MM

## (undated) DEVICE — SUT ETHILON 3-0 PS2 18 BLK

## (undated) DEVICE — SCISSOR 5MMX35CM DIRECT DRIVE

## (undated) DEVICE — ELECTRODE MEGADYNE RETURN DUAL

## (undated) DEVICE — SUT PDS II 0 CT-1 VIL MONO

## (undated) DEVICE — SUT 0 VICRYL / CT-1

## (undated) DEVICE — TRAY FOLEY 16FR INFECTION CONT

## (undated) DEVICE — BINDER ABDOMINAL 9 30-45

## (undated) DEVICE — DRAPE BAG ISOLATION 20 X 20

## (undated) DEVICE — DRAPE STERI INSTRUMENT 1018

## (undated) DEVICE — DRAPE LAP T SHT W/ INSTR PAD

## (undated) DEVICE — PAD CURAD NONADH 3X4IN

## (undated) DEVICE — DISSECTOR 5MM ENDOPATH

## (undated) DEVICE — SUT MCRYL PLUS 4-0 PS2 27IN

## (undated) DEVICE — IRRIGATOR ENDOSCOPY DISP.

## (undated) DEVICE — GOWN POLY REINF BRTH SLV XL

## (undated) DEVICE — TROCAR ENDOPATH XCEL 5X75MM

## (undated) DEVICE — TIP YANKAUERS BULB NO VENT

## (undated) DEVICE — SUT 4-0 12-30IN SILK

## (undated) DEVICE — SUT CTD VICRYL 3-0 UND BR

## (undated) DEVICE — DRAPE ABDOMINAL TIBURON 14X11

## (undated) DEVICE — SUT SILK 3-0 SH 18IN BLACK

## (undated) DEVICE — TOWEL OR DISP STRL BLUE 4/PK

## (undated) DEVICE — NDL HYPO REG 25G X 1 1/2

## (undated) DEVICE — SYR ONLY LUER LOCK 20CC

## (undated) DEVICE — DRESSING MEPILEX BORDER 4 X 4

## (undated) DEVICE — TRAY MINOR GEN SURG OMC

## (undated) DEVICE — SUT D SPECIAL VICRYL 2-0

## (undated) DEVICE — BOOT AIR FLUID HEEL ADLT STD

## (undated) DEVICE — SEE MEDLINE ITEM 156901

## (undated) DEVICE — BANDAGE ADHESIVE

## (undated) DEVICE — BLADE ELECTRO EDGE INSULATED

## (undated) DEVICE — SET DECANTER MEDICHOICE

## (undated) DEVICE — DRAIN CHEST DRY SUCTION

## (undated) DEVICE — SUT PROLENE 6-0 BV-1 30IN

## (undated) DEVICE — SUT 1 36IN PDS II VIO MONO

## (undated) DEVICE — DRESSING TRANS 4X4 TEGADERM

## (undated) DEVICE — DRAIN CHAN RND HUBLS 8MM 24FR

## (undated) DEVICE — SUT SILK 3-0 STRANDS 30IN

## (undated) DEVICE — TOWEL OR XRAY WHITE 17X26IN

## (undated) DEVICE — GOWN SURGICAL X-LARGE

## (undated) DEVICE — CLIP SPRING 6MM

## (undated) DEVICE — ADHESIVE DERMABOND ADVANCED

## (undated) DEVICE — EVACUATOR WOUND BULB 100CC

## (undated) DEVICE — SUT PROLENE 4-0 SH BLU 36IN

## (undated) DEVICE — ELECTRODE PAD DEFIB STERILE

## (undated) DEVICE — RELOAD ENDO GIA TRISTAPLE 45MM

## (undated) DEVICE — SUT SILK 2-0 SH 18IN BLACK

## (undated) DEVICE — COVER LIGHT HANDLE 80/CA

## (undated) DEVICE — HEMOSTAT SURGICEL NU-KNIT 6X9

## (undated) DEVICE — Device

## (undated) DEVICE — PAD K-THERMIA 24IN X 60IN

## (undated) DEVICE — SEE MEDLINE ITEM 146420

## (undated) DEVICE — SUT PDS BV 6-0

## (undated) DEVICE — KIT SAHARA DRAPE DRAW/LIFT

## (undated) DEVICE — PENCIL ROCKER SWITCH 10FT CORD

## (undated) DEVICE — SEE MEDLINE ITEM 156902

## (undated) DEVICE — KIT ANTIFOG

## (undated) DEVICE — SUT SILK 0 STRANDS 30IN BLK

## (undated) DEVICE — SUT 3-0 12-18IN SILK

## (undated) DEVICE — POWDER ARISTA AH 1GM

## (undated) DEVICE — DRAIN PENROSE XRAY 12 X 1/4 ST

## (undated) DEVICE — PENCIL SMK EVAC CONNECTOR 10FT

## (undated) DEVICE — FIBRILLAR ABS HEMOSTAT 4X4

## (undated) DEVICE — DRESSING AQUACEL SACRAL 9 X 9

## (undated) DEVICE — SUT PROLENE 5-0 36IN C-1

## (undated) DEVICE — TUBING SPECLM SMOKE EVAC 10MM

## (undated) DEVICE — TROCAR SPACEMAKER BLUNT 12MM

## (undated) DEVICE — SUT 0 30IN ETHIBOND EXCEL G

## (undated) DEVICE — DRAPE INCISE IOBAN 2 23X17IN

## (undated) DEVICE — NDL MONOPTY BIOPSY 14GX10CM

## (undated) DEVICE — TROCAR ENDOPATH XCEL 5MM 7.5CM

## (undated) DEVICE — SUT VICRYL 3-0 27 SH

## (undated) DEVICE — SUT PROLENE 3-0 SH DA 36 BL

## (undated) DEVICE — SYR 10CC LUER LOCK

## (undated) DEVICE — CONTAINER SPECIMEN OR STER 4OZ

## (undated) DEVICE — ELECTRODE BLADE INSULATED 1 IN

## (undated) DEVICE — SUT PDSII 4-0 PS-2 CLEAR MO

## (undated) DEVICE — TROCAR SPACEMAKER BLUNT 10MM

## (undated) DEVICE — SUT PROLENE 0 MO6 30IN BLUE

## (undated) DEVICE — DRESSING AQUACEL FOAM 3 X 3

## (undated) DEVICE — TAPE MEDIPORE 4IN X 2YDS

## (undated) DEVICE — SEE MEDLINE ITEM 146417

## (undated) DEVICE — SUT 2-0 12-18IN SILK

## (undated) DEVICE — CATH URETHRAL RED RUBBER 18FR

## (undated) DEVICE — TUBING SUC UNIV W/CONN 12FT

## (undated) DEVICE — SUT VICRYL PLUS 3-0 SH 18IN

## (undated) DEVICE — STAPLER SKIN PROXIMATE WIDE

## (undated) DEVICE — KIT ANTIFOG W/SPONG & FLUID

## (undated) DEVICE — SET IV ADMIN 15DROP 3 CARESITE

## (undated) DEVICE — CATH THORACIC 28FR ST

## (undated) DEVICE — DRESSING ADH ISLAND 3.6 X 14

## (undated) DEVICE — GLOVE BIOGEL PI MICRO SZ 7.5

## (undated) DEVICE — APPLICATOR ARISTA FLEX XL

## (undated) DEVICE — SPONGE LAP 18X18 PREWASHED

## (undated) DEVICE — STAPLER GIA HANDLE STD

## (undated) DEVICE — SUT VICRYL 2-0 CT-2 VCP269H

## (undated) DEVICE — SET EXTENSION STERILE 30IN

## (undated) DEVICE — TUBING HF INSUFFLATION W/ FLTR

## (undated) DEVICE — CATH THOR STND RGHT ANG 28F

## (undated) DEVICE — SUT 4-0 12-18IN SILK BLACK

## (undated) DEVICE — SPONGE COTTON TRAY 4X4IN